# Patient Record
Sex: FEMALE | Race: BLACK OR AFRICAN AMERICAN | NOT HISPANIC OR LATINO | Employment: OTHER | URBAN - METROPOLITAN AREA
[De-identification: names, ages, dates, MRNs, and addresses within clinical notes are randomized per-mention and may not be internally consistent; named-entity substitution may affect disease eponyms.]

---

## 2023-02-24 ENCOUNTER — DOCUMENTATION (OUTPATIENT)
Dept: PRIMARY CARE | Facility: CLINIC | Age: 63
End: 2023-02-24
Payer: MEDICARE

## 2023-03-06 ENCOUNTER — NURSING HOME VISIT (OUTPATIENT)
Dept: POST ACUTE CARE | Facility: EXTERNAL LOCATION | Age: 63
End: 2023-03-06
Payer: MEDICARE

## 2023-03-06 DIAGNOSIS — R26.2 AMBULATORY DYSFUNCTION: ICD-10-CM

## 2023-03-06 DIAGNOSIS — A04.72 CLOSTRIDIUM DIFFICILE COLITIS: ICD-10-CM

## 2023-03-06 DIAGNOSIS — U07.1 COVID-19: ICD-10-CM

## 2023-03-06 DIAGNOSIS — E27.40 ADRENAL INSUFFICIENCY (MULTI): ICD-10-CM

## 2023-03-06 DIAGNOSIS — M32.9 LUPUS (MULTI): Primary | ICD-10-CM

## 2023-03-06 PROCEDURE — 99306 1ST NF CARE HIGH MDM 50: CPT | Performed by: STUDENT IN AN ORGANIZED HEALTH CARE EDUCATION/TRAINING PROGRAM

## 2023-03-06 ASSESSMENT — ENCOUNTER SYMPTOMS
RESPIRATORY NEGATIVE: 1
PSYCHIATRIC NEGATIVE: 1
NEUROLOGICAL NEGATIVE: 1
MUSCULOSKELETAL NEGATIVE: 1
GASTROINTESTINAL NEGATIVE: 1
CONSTITUTIONAL NEGATIVE: 1
CARDIOVASCULAR NEGATIVE: 1

## 2023-03-06 NOTE — PROGRESS NOTES
Subjective   Patient ID: Bing Holliday is a 62 y.o. female who was recently admitted to [facility] for worsening weakness. Pt was treated with supportive measures and overall improved. Pt was noted to test positive for C.diff, in addition, did test positive for COVID-19 as well. Was also found to have worsening hypoglycemic episdoes. Found to have adrenal insufficiency and recommend fu with endocrine. Pt overall feels well and much improved. Working well with therapy, regards no acute complaints at this time. Diarrhea improved.        Review of Systems   Constitutional: Negative.    HENT: Negative.     Respiratory: Negative.     Cardiovascular: Negative.    Gastrointestinal: Negative.    Musculoskeletal: Negative.    Neurological: Negative.    Psychiatric/Behavioral: Negative.         Objective   Physical Exam  Constitutional:       General: She is not in acute distress.     Appearance: She is not ill-appearing.   HENT:      Mouth/Throat:      Mouth: Mucous membranes are moist.      Pharynx: Oropharynx is clear. No oropharyngeal exudate or posterior oropharyngeal erythema.   Eyes:      Pupils: Pupils are equal, round, and reactive to light.   Cardiovascular:      Rate and Rhythm: Normal rate and regular rhythm.      Heart sounds: No murmur heard.  Pulmonary:      Effort: Pulmonary effort is normal. No respiratory distress.      Breath sounds: No wheezing.   Abdominal:      General: Abdomen is flat. Bowel sounds are normal. There is no distension.      Palpations: Abdomen is soft.      Tenderness: There is no abdominal tenderness.   Musculoskeletal:         General: No tenderness. Normal range of motion.   Skin:     General: Skin is warm and dry.   Neurological:      General: No focal deficit present.      Mental Status: She is alert and oriented to person, place, and time. Mental status is at baseline.   Psychiatric:         Mood and Affect: Mood normal.         Assessment/Plan   Problem List Items  Addressed This Visit          Digestive    Clostridium difficile colitis     On oral vanc, overall improved, continue isolation precautions         Adrenal insufficiency (CMS/HCC)     Likely from chronic steroids, blood sugars stable, recommend fu with endocrine            Infectious/Inflammatory    COVID-19     Overall stable on RA            Other    Lupus (CMS/HCC) - Primary     Follows with rheumatology recently exacerbated, recommend fu with rheum         Ambulatory dysfunction     Pt/ot continue current management             >50 minutes spent in chart review and care planning, staff and patient updated

## 2023-03-06 NOTE — LETTER
Subjective  Patient ID: Bing Holliday is a 62 y.o. female who was recently admitted to [facility] for worsening weakness. Pt was treated with supportive measures and overall improved. Pt was noted to test positive for C.diff, in addition, did test positive for COVID-19 as well. Was also found to have worsening hypoglycemic episdoes. Found to have adrenal insufficiency and recommend fu with endocrine. Pt overall feels well and much improved. Working well with therapy, regards no acute complaints at this time. Diarrhea improved.        Review of Systems   Constitutional: Negative.    HENT: Negative.     Respiratory: Negative.     Cardiovascular: Negative.    Gastrointestinal: Negative.    Musculoskeletal: Negative.    Neurological: Negative.    Psychiatric/Behavioral: Negative.         Objective  Physical Exam  Constitutional:       General: She is not in acute distress.     Appearance: She is not ill-appearing.   HENT:      Mouth/Throat:      Mouth: Mucous membranes are moist.      Pharynx: Oropharynx is clear. No oropharyngeal exudate or posterior oropharyngeal erythema.   Eyes:      Pupils: Pupils are equal, round, and reactive to light.   Cardiovascular:      Rate and Rhythm: Normal rate and regular rhythm.      Heart sounds: No murmur heard.  Pulmonary:      Effort: Pulmonary effort is normal. No respiratory distress.      Breath sounds: No wheezing.   Abdominal:      General: Abdomen is flat. Bowel sounds are normal. There is no distension.      Palpations: Abdomen is soft.      Tenderness: There is no abdominal tenderness.   Musculoskeletal:         General: No tenderness. Normal range of motion.   Skin:     General: Skin is warm and dry.   Neurological:      General: No focal deficit present.      Mental Status: She is alert and oriented to person, place, and time. Mental status is at baseline.   Psychiatric:         Mood and Affect: Mood normal.         Assessment/Plan  Problem List Items  Addressed This Visit          Digestive    Clostridium difficile colitis     On oral vanc, overall improved, continue isolation precautions         Adrenal insufficiency (CMS/HCC)     Likely from chronic steroids, blood sugars stable, recommend fu with endocrine            Infectious/Inflammatory    COVID-19     Overall stable on RA            Other    Lupus (CMS/HCC) - Primary     Follows with rheumatology recently exacerbated, recommend fu with rheum         Ambulatory dysfunction     Pt/ot continue current management             >50 minutes spent in chart review and care planning, staff and patient updated

## 2023-03-07 DIAGNOSIS — I10 ESSENTIAL (PRIMARY) HYPERTENSION: ICD-10-CM

## 2023-03-07 RX ORDER — FUROSEMIDE 20 MG/1
TABLET ORAL
Qty: 90 TABLET | Refills: 0 | Status: SHIPPED | OUTPATIENT
Start: 2023-03-07 | End: 2023-06-07

## 2023-03-10 ENCOUNTER — NURSING HOME VISIT (OUTPATIENT)
Dept: POST ACUTE CARE | Facility: EXTERNAL LOCATION | Age: 63
End: 2023-03-10
Payer: MEDICARE

## 2023-03-10 DIAGNOSIS — E27.40 ADRENAL INSUFFICIENCY (MULTI): ICD-10-CM

## 2023-03-10 DIAGNOSIS — A04.72 CLOSTRIDIUM DIFFICILE COLITIS: Primary | ICD-10-CM

## 2023-03-10 DIAGNOSIS — M32.9 LUPUS (MULTI): ICD-10-CM

## 2023-03-10 DIAGNOSIS — U07.1 COVID-19: ICD-10-CM

## 2023-03-10 DIAGNOSIS — R26.2 AMBULATORY DYSFUNCTION: ICD-10-CM

## 2023-03-10 PROCEDURE — 99308 SBSQ NF CARE LOW MDM 20: CPT | Performed by: STUDENT IN AN ORGANIZED HEALTH CARE EDUCATION/TRAINING PROGRAM

## 2023-03-10 ASSESSMENT — ENCOUNTER SYMPTOMS
CONSTITUTIONAL NEGATIVE: 1
CARDIOVASCULAR NEGATIVE: 1
PSYCHIATRIC NEGATIVE: 1
MUSCULOSKELETAL NEGATIVE: 1
NEUROLOGICAL NEGATIVE: 1
RESPIRATORY NEGATIVE: 1
GASTROINTESTINAL NEGATIVE: 1

## 2023-03-10 NOTE — LETTER
Subjective  Patient ID: Bing Holliday is a 62 y.o. female.    Seen on routine eval, feels overall well. Diarrhea is resolving. States she is working with therapy. No additional issues.         Review of Systems   Constitutional: Negative.    HENT: Negative.     Respiratory: Negative.     Cardiovascular: Negative.    Gastrointestinal: Negative.    Musculoskeletal: Negative.    Neurological: Negative.    Psychiatric/Behavioral: Negative.     All other systems reviewed and are negative.      Objective- vitals reviewed via EMR from [facility]  Physical Exam  Vitals and nursing note reviewed.   Constitutional:       General: She is not in acute distress.     Appearance: She is not ill-appearing.   HENT:      Mouth/Throat:      Mouth: Mucous membranes are moist.      Pharynx: Oropharynx is clear. No oropharyngeal exudate or posterior oropharyngeal erythema.   Eyes:      Pupils: Pupils are equal, round, and reactive to light.   Cardiovascular:      Rate and Rhythm: Normal rate and regular rhythm.      Heart sounds: No murmur heard.  Pulmonary:      Effort: Pulmonary effort is normal. No respiratory distress.      Breath sounds: No wheezing.   Abdominal:      General: Abdomen is flat. Bowel sounds are normal. There is no distension.      Palpations: Abdomen is soft.      Tenderness: There is no abdominal tenderness.   Musculoskeletal:         General: No tenderness. Normal range of motion.   Skin:     General: Skin is warm and dry.   Neurological:      General: No focal deficit present.      Mental Status: She is alert and oriented to person, place, and time. Mental status is at baseline.   Psychiatric:         Mood and Affect: Mood normal.         Assessment/Plan  Diagnoses and all orders for this visit:  Clostridium difficile colitis  Adrenal insufficiency (CMS/HCC)  Lupus (CMS/HCC)  Ambulatory dysfunction  COVID-19      Overall stable, continue current care, diarrhea improving. Supportive care, weekly labs    Ayaan ramirez  DO  3/10/23

## 2023-03-11 NOTE — PROGRESS NOTES
Subjective   Patient ID: Bing Holliday is a 62 y.o. female.    Seen on routine eval, feels overall well. Diarrhea is resolving. States she is working with therapy. No additional issues.         Review of Systems   Constitutional: Negative.    HENT: Negative.     Respiratory: Negative.     Cardiovascular: Negative.    Gastrointestinal: Negative.    Musculoskeletal: Negative.    Neurological: Negative.    Psychiatric/Behavioral: Negative.     All other systems reviewed and are negative.      Objective - vitals reviewed via EMR from [facility]  Physical Exam  Vitals and nursing note reviewed.   Constitutional:       General: She is not in acute distress.     Appearance: She is not ill-appearing.   HENT:      Mouth/Throat:      Mouth: Mucous membranes are moist.      Pharynx: Oropharynx is clear. No oropharyngeal exudate or posterior oropharyngeal erythema.   Eyes:      Pupils: Pupils are equal, round, and reactive to light.   Cardiovascular:      Rate and Rhythm: Normal rate and regular rhythm.      Heart sounds: No murmur heard.  Pulmonary:      Effort: Pulmonary effort is normal. No respiratory distress.      Breath sounds: No wheezing.   Abdominal:      General: Abdomen is flat. Bowel sounds are normal. There is no distension.      Palpations: Abdomen is soft.      Tenderness: There is no abdominal tenderness.   Musculoskeletal:         General: No tenderness. Normal range of motion.   Skin:     General: Skin is warm and dry.   Neurological:      General: No focal deficit present.      Mental Status: She is alert and oriented to person, place, and time. Mental status is at baseline.   Psychiatric:         Mood and Affect: Mood normal.         Assessment/Plan   Diagnoses and all orders for this visit:  Clostridium difficile colitis  Adrenal insufficiency (CMS/HCC)  Lupus (CMS/HCC)  Ambulatory dysfunction  COVID-19      Overall stable, continue current care, diarrhea improving. Supportive care, weekly labs    Ayaan  ashley DO  3/10/23

## 2023-03-15 ENCOUNTER — NURSING HOME VISIT (OUTPATIENT)
Dept: POST ACUTE CARE | Facility: EXTERNAL LOCATION | Age: 63
End: 2023-03-15
Payer: MEDICARE

## 2023-03-15 DIAGNOSIS — R26.2 AMBULATORY DYSFUNCTION: ICD-10-CM

## 2023-03-15 DIAGNOSIS — E27.40 ADRENAL INSUFFICIENCY (MULTI): ICD-10-CM

## 2023-03-15 DIAGNOSIS — A04.72 CLOSTRIDIUM DIFFICILE COLITIS: Primary | ICD-10-CM

## 2023-03-15 DIAGNOSIS — U07.1 COVID-19: ICD-10-CM

## 2023-03-15 DIAGNOSIS — M32.9 LUPUS (MULTI): ICD-10-CM

## 2023-03-15 PROCEDURE — 99308 SBSQ NF CARE LOW MDM 20: CPT | Performed by: STUDENT IN AN ORGANIZED HEALTH CARE EDUCATION/TRAINING PROGRAM

## 2023-03-15 ASSESSMENT — ENCOUNTER SYMPTOMS
CONSTITUTIONAL NEGATIVE: 1
GASTROINTESTINAL NEGATIVE: 1
NEUROLOGICAL NEGATIVE: 1
MUSCULOSKELETAL NEGATIVE: 1
RESPIRATORY NEGATIVE: 1
PSYCHIATRIC NEGATIVE: 1
CARDIOVASCULAR NEGATIVE: 1

## 2023-03-15 NOTE — LETTER
Patient: Bing Holliday  : 1960    Encounter Date: 03/15/2023    Subjective  Patient ID: Bing Holliday is a 62 y.o. female.    Pt seen and evaluated. Overall is doing well with no complaints. She is working well with therapy and feels much improved but still feels weak. Feels as if she could benefit from further therapy. Diarrhea has improved. She reports no additional issues or concerns.         Review of Systems   Constitutional: Negative.    HENT: Negative.     Respiratory: Negative.     Cardiovascular: Negative.    Gastrointestinal: Negative.    Musculoskeletal: Negative.    Neurological: Negative.    Psychiatric/Behavioral: Negative.         ObjectiveVitals reviewed via facility EMR  Physical Exam  Constitutional:       General: She is not in acute distress.     Appearance: She is not ill-appearing.   HENT:      Mouth/Throat:      Mouth: Mucous membranes are moist.      Pharynx: Oropharynx is clear. No oropharyngeal exudate or posterior oropharyngeal erythema.   Eyes:      Pupils: Pupils are equal, round, and reactive to light.   Cardiovascular:      Rate and Rhythm: Normal rate and regular rhythm.      Heart sounds: No murmur heard.  Pulmonary:      Effort: Pulmonary effort is normal. No respiratory distress.      Breath sounds: No wheezing.   Abdominal:      General: Abdomen is flat. Bowel sounds are normal. There is no distension.      Palpations: Abdomen is soft.      Tenderness: There is no abdominal tenderness.   Musculoskeletal:         General: No tenderness. Normal range of motion.   Skin:     General: Skin is warm and dry.   Neurological:      General: No focal deficit present.      Mental Status: She is alert and oriented to person, place, and time. Mental status is at baseline.   Psychiatric:         Mood and Affect: Mood normal.         Assessment/Plan  Diagnoses and all orders for this visit:  Clostridium difficile colitis  Adrenal insufficiency (CMS/[facility])  COVID-19  Lupus  (CMS/[facility])  Ambulatory dysfunction    Pt overall improved, continue PT/OT, would benefit from further therapy in SNF. Weekly labs, supportive care    Ayaan Koehler DO  3/15/23      Electronically Signed By: Ayaan Koehler DO   3/15/23  9:34 PM

## 2023-03-16 NOTE — PROGRESS NOTES
Subjective   Patient ID: Bing Holliday is a 62 y.o. female.    Pt seen and evaluated. Overall is doing well with no complaints. She is working well with therapy and feels much improved but still feels weak. Feels as if she could benefit from further therapy. Diarrhea has improved. She reports no additional issues or concerns.         Review of Systems   Constitutional: Negative.    HENT: Negative.     Respiratory: Negative.     Cardiovascular: Negative.    Gastrointestinal: Negative.    Musculoskeletal: Negative.    Neurological: Negative.    Psychiatric/Behavioral: Negative.         Objective Vitals reviewed via facility EMR  Physical Exam  Constitutional:       General: She is not in acute distress.     Appearance: She is not ill-appearing.   HENT:      Mouth/Throat:      Mouth: Mucous membranes are moist.      Pharynx: Oropharynx is clear. No oropharyngeal exudate or posterior oropharyngeal erythema.   Eyes:      Pupils: Pupils are equal, round, and reactive to light.   Cardiovascular:      Rate and Rhythm: Normal rate and regular rhythm.      Heart sounds: No murmur heard.  Pulmonary:      Effort: Pulmonary effort is normal. No respiratory distress.      Breath sounds: No wheezing.   Abdominal:      General: Abdomen is flat. Bowel sounds are normal. There is no distension.      Palpations: Abdomen is soft.      Tenderness: There is no abdominal tenderness.   Musculoskeletal:         General: No tenderness. Normal range of motion.   Skin:     General: Skin is warm and dry.   Neurological:      General: No focal deficit present.      Mental Status: She is alert and oriented to person, place, and time. Mental status is at baseline.   Psychiatric:         Mood and Affect: Mood normal.         Assessment/Plan   Diagnoses and all orders for this visit:  Clostridium difficile colitis  Adrenal insufficiency (CMS/[facility])  COVID-19  Lupus (CMS/[facility])  Ambulatory dysfunction    Pt overall improved, continue PT/OT, would  benefit from further therapy in SNF. Weekly labs, supportive care    Ayaan Koehler DO  3/15/23

## 2023-03-17 ENCOUNTER — PATIENT OUTREACH (OUTPATIENT)
Dept: PRIMARY CARE | Facility: CLINIC | Age: 63
End: 2023-03-17
Payer: MEDICARE

## 2023-03-17 DIAGNOSIS — A04.72 ENTEROCOLITIS DUE TO CLOSTRIDIUM DIFFICILE: ICD-10-CM

## 2023-03-17 NOTE — PROGRESS NOTES
"Discharge facility: [facility]  Discharge diagnosis: Enterocolitis Due to Clostridium Difficile, Covid 19, weakness   Admission date: 3/4/2023  Discharge date: 3/16/2023     HPI from [place] on 3/6:  \"Bing Holliday is a 62 y.o. female who was recently admitted to [facility] for worsening weakness. Pt was treated with supportive measures and overall improved. Pt was noted to test positive for C.diff, in addition, did test positive for COVID-19 as well. Was also found to have worsening hypoglycemic episdoes. Found to have adrenal insufficiency and recommend fu with endocrine. Pt overall feels well and much improved. Working well with therapy, regards no acute complaints at this time. Diarrhea improved.\"       3/15/2023:  \"Pt seen and evaluated. Overall is doing well with no complaints. She is working well with therapy and feels much improved but still feels weak. Feels as if she could benefit from further therapy. Diarrhea has improved. She reports no additional issues or concerns.      Pt overall improved, continue PT/OT, would benefit from further therapy in SNF. Weekly labs, supportive care\"    Medications  Medications reviewed with patient/caregiver?: No (3/17/2023  4:59 PM)  Is the patient having any side effects they believe may be caused by any medication additions or changes?: No (3/17/2023  4:59 PM)  Does the patient have all medications ordered at discharge?: Yes (3/17/2023  4:59 PM)  Is the patient taking all medications as directed (includes completed medication regime)?: Yes (3/17/2023  4:59 PM)    Appointments  Does the patient have a primary care provider?: Yes (3/17/2023  4:59 PM)  Care Management Interventions: Advised patient to make appointment (3/17/2023  4:59 PM)    Self Management  What is the home health agency?: [facility] (3/17/2023  4:59 PM)  Has home health visited the patient within 72 hours of discharge?: No (3/17/2023  4:59 PM)  Home " Health Interventions: Called home health agency (3/17/2023  4:59 PM)  Follow Up Tasks: Home Health (3/17/2023  4:59 PM)    Patient Teaching  What is the patient's perception of their health status since discharge?: Improving (3/17/2023  4:59 PM)      Spoke to patient. She is home now and states she is doing well. She does have someone that she is living with. She is eating and drinking well. She denies any medication questions. She has the medications that were given to her from the rehab facility. Patient is able to get up and ambulate using a walker. She is alert and oriented x3. She has not heard from the Home health company. Prior to admission she was active with [facility]. I called and left them a message that patient is home now and will need a resumption of care. She has not made appt with PCP yet. I plan on calling her back next week to make sure she has been contacted by home care and I can help arrange an appt.

## 2023-03-20 ENCOUNTER — PATIENT OUTREACH (OUTPATIENT)
Dept: PRIMARY CARE | Facility: CLINIC | Age: 63
End: 2023-03-20
Payer: MEDICARE

## 2023-03-20 DIAGNOSIS — M32.9 LUPUS (MULTI): ICD-10-CM

## 2023-03-20 DIAGNOSIS — A04.72 ENTEROCOLITIS DUE TO CLOSTRIDIUM DIFFICILE: ICD-10-CM

## 2023-03-20 DIAGNOSIS — E27.40 ADRENAL INSUFFICIENCY (MULTI): ICD-10-CM

## 2023-03-20 NOTE — CARE PLAN
Call placed to [facility]. They said they never received any actual orders from the SNF. I called and spoke to Samson at [facility] and she confirmed this was sent to Trish Preston at [facility]. So I called them back and left another message letting them know this is who should have the orders.   CCM outreach attempted to patient. LMTCB on home number.

## 2023-04-06 ENCOUNTER — PATIENT OUTREACH (OUTPATIENT)
Dept: PRIMARY CARE | Facility: CLINIC | Age: 63
End: 2023-04-06
Payer: MEDICARE

## 2023-04-06 DIAGNOSIS — M32.9 LUPUS (MULTI): ICD-10-CM

## 2023-04-06 DIAGNOSIS — E27.40 ADRENAL INSUFFICIENCY (MULTI): ICD-10-CM

## 2023-04-06 RX ORDER — FOLIC ACID 1 MG/1
1 TABLET ORAL DAILY
COMMUNITY
Start: 2021-12-02 | End: 2023-07-05

## 2023-04-06 RX ORDER — ARIPIPRAZOLE 2 MG/1
2 TABLET ORAL NIGHTLY
COMMUNITY
Start: 2023-04-04 | End: 2023-04-27 | Stop reason: SDUPTHER

## 2023-04-06 RX ORDER — BLOOD-GLUCOSE METER
1 EACH MISCELLANEOUS
COMMUNITY
Start: 2023-04-04 | End: 2023-07-05 | Stop reason: SDUPTHER

## 2023-04-06 RX ORDER — LANCETS 33 GAUGE
1 EACH MISCELLANEOUS
Status: ON HOLD | COMMUNITY
Start: 2023-04-04 | End: 2023-10-20 | Stop reason: ENTERED-IN-ERROR

## 2023-04-06 RX ORDER — PANTOPRAZOLE SODIUM 40 MG/1
1 TABLET, DELAYED RELEASE ORAL DAILY
COMMUNITY
Start: 2021-11-24 | End: 2023-04-26

## 2023-04-06 RX ORDER — AMLODIPINE BESYLATE 2.5 MG/1
1 TABLET ORAL DAILY
COMMUNITY
Start: 2022-02-07 | End: 2024-03-26 | Stop reason: HOSPADM

## 2023-04-06 RX ORDER — MYCOPHENOLATE MOFETIL 500 MG/1
1000 TABLET ORAL 2 TIMES DAILY
COMMUNITY
Start: 2021-07-29 | End: 2024-02-07

## 2023-04-06 RX ORDER — LANCETS 30 GAUGE
EACH MISCELLANEOUS
Status: ON HOLD | COMMUNITY
Start: 2023-04-04 | End: 2023-10-20 | Stop reason: ENTERED-IN-ERROR

## 2023-04-06 RX ORDER — VANCOMYCIN HYDROCHLORIDE 125 MG/1
125 CAPSULE ORAL EVERY OTHER DAY
COMMUNITY
Start: 2023-04-04 | End: 2023-04-10

## 2023-04-06 RX ORDER — ATORVASTATIN CALCIUM 40 MG/1
40 TABLET, FILM COATED ORAL DAILY
COMMUNITY
End: 2023-04-21 | Stop reason: SDUPTHER

## 2023-04-06 RX ORDER — PREDNISONE 5 MG/1
TABLET ORAL EVERY 24 HOURS
COMMUNITY
Start: 2023-04-04 | End: 2023-04-06

## 2023-04-06 NOTE — PROGRESS NOTES
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 Initially treated on stress dose steroids initially due to concerns for adrenal insufficiency however her AM cortisol and ACTH not c/w this. Endocrinology consulted for evaluation. Labs not c/w insulinoma. Hypoglycemia resolved. MRI brain ordered to evaluate for CVA seen on CT, demonstrated remote PCA territory infarct. She continues on home apixaban and statin. TTE without e/o thrombus or PFO. Course also c/b recurrent hypothermia, felt due to lupus (however labs not c/w flare) vs risperidone. She was started on high dose prednisone and switched to abilify. Hypothermia resolved. She will be discharged on steroid taper and will f/u with Dr Castellanos and her PCP. Unfornately she was denied precert for acute rehab and SNF, and after conversation with her son it was ultimately agreed that she would go home with her son and [facility] for PT/OT/RN and HHA.     Of note, patient noted to have more labile BGs towards the end of her admission. Given her tendency to run towards symptomatic hypoglycemia, elected not to start any antihyperglycemics as it is anticipated that her BGs will decrease when her steroid dose decreases and her BG still remains low-normal in the AM. Renewed rx for test strips, lancets, glucose tabs.     Medications  Medications reviewed with patient/caregiver?: Yes (4/6/2023  2:28 PM)  Is the patient having any side effects they believe may be caused by any medication additions or changes?: (!)  Yes (Prednisone causing elevated glucose levels) (4/6/2023  2:28 PM)  Does the patient have all medications ordered at discharge?: Yes (4/6/2023  2:28 PM)  Care Management Interventions: Advised patient to call provider (4/6/2023  2:28 PM)  Is the patient taking all medications as directed (includes completed medication regime)?: Yes (4/6/2023  2:28 PM)  Care Management Interventions: Notified provider (4/6/2023  2:28 PM)  Medication Comments: Message to provider about glucose of 538 last evening and 284 this morning. (4/6/2023  2:28 PM)    Appointments  Does the patient have a primary care provider?: Yes (4/6/2023  2:28 PM)  Care Management Interventions: Verified appointment date/time/provider (4/6/2023  2:28 PM)  Has the patient kept scheduled appointments due by today?: Yes (4/6/2023  2:28 PM)  Care Management Interventions: Advised patient to keep appointment (4/6/2023  2:28 PM)    Self Management  What is the home health agency?: [facility] (4/6/2023  2:28 PM)  Has home health visited the patient within 72 hours of discharge?: Yes (4/6/2023  2:28 PM)    Patient Teaching  Does the patient have access to their discharge instructions?: Yes (4/6/2023  2:28 PM)  What is the patient's perception of their health status since discharge?: Improving (4/6/2023  2:28 PM)  Is the patient/caregiver able to teach back the hierarchy of who to call/visit for symptoms/problems? PCP, Specialist, Home Health nurse, Urgent Care, ED, 911: Yes (4/6/2023  2:28 PM)  Patient/Caregiver Education Comments: Patient checking Temp twice a day. Patient aware if <95 degrees persistently, she must return to ER (4/6/2023  2:28 PM)      RNCM call to patient:  Called and spoke to patient regarding recent discharge from the hospital. Patient was sent home on a Prednisone taper dose. She was having issues while admitted with elevated and low blood glucose levels. They did not want her going home on antihyperglycemics because she tended to  run towards symptomatic hypoglycemia. Also because the elevated glucose levels are most likely related to steroid use and should fall once that is complete. However patient will still be on this for some time. She is checking her glucose at home. Last night after dinner she got 538 and this morning fasting she got 284. These are very elevated so I am sending a message to the PCP to see if he wants to initiate any new medications. Patient follows up with him on 4/13. We reviewed all other medications. Patient lives with family. She is doing better with her mentation. She is doing better with ambulation. [facility] RN was out to see her already. PT/OT will evaluate her next week. She is checking her temp twice a daily as advised on her summary. If anything persistently less than 95, she is to return to the ER for evaluation. Patient does not have an appt with Dr. Castellanos (Rheumatology) yet. I offered to help make one, but she felt comfortable doing this herself.

## 2023-04-10 ENCOUNTER — PATIENT OUTREACH (OUTPATIENT)
Dept: PRIMARY CARE | Facility: CLINIC | Age: 63
End: 2023-04-10
Payer: MEDICARE

## 2023-04-10 DIAGNOSIS — E27.40 ADRENAL INSUFFICIENCY (MULTI): ICD-10-CM

## 2023-04-10 DIAGNOSIS — E11.42 DIABETIC POLYNEUROPATHY ASSOCIATED WITH TYPE 2 DIABETES MELLITUS (MULTI): ICD-10-CM

## 2023-04-10 DIAGNOSIS — M32.9 LUPUS (MULTI): ICD-10-CM

## 2023-04-10 NOTE — CARE PLAN
Call placed to patient. She has not heard back from PCP's office about her glucose levels. She states she is still having elevated levels. Highest over the weekend was 530. She is in the 200's in the mornings. She figured this meant that he wasn't concerned about it since she didn't get a call. She does have an appt on Thursday with him for follow up. I did double check and I did route my last note to him for review and I do not see this issue has been addressed.

## 2023-04-13 ENCOUNTER — OFFICE VISIT (OUTPATIENT)
Dept: PRIMARY CARE | Facility: CLINIC | Age: 63
End: 2023-04-13
Payer: MEDICARE

## 2023-04-13 ENCOUNTER — TELEPHONE (OUTPATIENT)
Dept: PRIMARY CARE | Facility: CLINIC | Age: 63
End: 2023-04-13

## 2023-04-13 VITALS
TEMPERATURE: 96.9 F | BODY MASS INDEX: 29.33 KG/M2 | DIASTOLIC BLOOD PRESSURE: 80 MMHG | WEIGHT: 198 LBS | SYSTOLIC BLOOD PRESSURE: 124 MMHG | OXYGEN SATURATION: 95 % | HEART RATE: 64 BPM | HEIGHT: 69 IN | RESPIRATION RATE: 16 BRPM

## 2023-04-13 DIAGNOSIS — Z78.0 POSTMENOPAUSAL: Primary | ICD-10-CM

## 2023-04-13 DIAGNOSIS — D46.9 MYELODYSPLASTIC SYNDROME, UNSPECIFIED (MULTI): ICD-10-CM

## 2023-04-13 DIAGNOSIS — Z79.52 ON PREDNISONE THERAPY: ICD-10-CM

## 2023-04-13 DIAGNOSIS — I77.89 LUPUS VASCULITIS (MULTI): ICD-10-CM

## 2023-04-13 DIAGNOSIS — E11.65 UNCONTROLLED DIABETES MELLITUS WITH HYPERGLYCEMIA, WITHOUT LONG-TERM CURRENT USE OF INSULIN (MULTI): ICD-10-CM

## 2023-04-13 DIAGNOSIS — F41.9 ANXIETY: ICD-10-CM

## 2023-04-13 DIAGNOSIS — M32.9 LUPUS (MULTI): Primary | ICD-10-CM

## 2023-04-13 DIAGNOSIS — I10 PRIMARY HYPERTENSION: ICD-10-CM

## 2023-04-13 DIAGNOSIS — R26.2 AMBULATORY DYSFUNCTION: ICD-10-CM

## 2023-04-13 DIAGNOSIS — M32.19 LUPUS VASCULITIS (MULTI): ICD-10-CM

## 2023-04-13 PROBLEM — A04.72 CLOSTRIDIUM DIFFICILE COLITIS: Status: RESOLVED | Noted: 2023-03-06 | Resolved: 2023-04-13

## 2023-04-13 PROCEDURE — 3079F DIAST BP 80-89 MM HG: CPT | Performed by: FAMILY MEDICINE

## 2023-04-13 PROCEDURE — 3066F NEPHROPATHY DOC TX: CPT | Performed by: FAMILY MEDICINE

## 2023-04-13 PROCEDURE — 99495 TRANSJ CARE MGMT MOD F2F 14D: CPT | Performed by: FAMILY MEDICINE

## 2023-04-13 PROCEDURE — 3074F SYST BP LT 130 MM HG: CPT | Performed by: FAMILY MEDICINE

## 2023-04-13 PROCEDURE — 3044F HG A1C LEVEL LT 7.0%: CPT | Performed by: FAMILY MEDICINE

## 2023-04-13 PROCEDURE — 1036F TOBACCO NON-USER: CPT | Performed by: FAMILY MEDICINE

## 2023-04-13 RX ORDER — MULTIVITAMIN
1 TABLET ORAL DAILY
Status: ON HOLD | COMMUNITY
Start: 2022-05-26 | End: 2024-05-08 | Stop reason: ENTERED-IN-ERROR

## 2023-04-13 RX ORDER — PREDNISONE 2.5 MG/1
1 TABLET ORAL DAILY
COMMUNITY
Start: 2022-08-31 | End: 2023-07-05

## 2023-04-13 RX ORDER — FLUDROCORTISONE ACETATE 0.1 MG/1
0.1 TABLET ORAL
COMMUNITY
End: 2023-05-24

## 2023-04-13 NOTE — PROGRESS NOTES
"Subjective   Patient ID: Bing Holliday is a 62 y.o. female who presents for Hospital Follow-up.  [facility]  Hospital follow up  KRISTAL complete  Admitted to [facility]  Admission dates 3/22/23 to 04/04/23  Admitted for low blood glucose and low BP  Days since discharge 9 day  Patient had BW, EKG, MRI    Today's BS was 151   Patient advised to follow up with PCP    Patient was prescribed     No other concern     Review of systems  ; Patient seen today for exam denies any problems with headaches or vision, denies any shortness of breath chest pain nausea or vomiting, no black stool no blood in the stool no heartburn type symptoms denies any problems with constipation or diarrhea, and no dysuria-type symptoms    The patient's allergies medications were reviewed with them today    The patient's social family and surgical history or also reviewed here today, along with her past medical history.     Objective     Alert and active in  no acute distress  HEENT TMs clear oropharynx negative nares clear no drainage noted neck supple  With no adenopathy   Heart regular rate and rhythm without murmur and no carotid bruits  Lungs- clear to auscultation bilaterally, no wheeze or rhonchi noted  Thyroid -negative masses or nodularity  Abdomen- soft times four quadrants , bowel sounds positive no masses or organomegaly, negative tenderness guarding or rebound  Neurological exam unremarkable- DTRs in upper and lower extremities within normal limits.   skin -no lesions noted      /80 (BP Location: Right arm, Patient Position: Sitting, BP Cuff Size: Large adult)   Pulse 64   Temp 36.1 °C (96.9 °F) (Temporal)   Resp 16   Ht 1.753 m (5' 9\")   Wt 89.8 kg (198 lb)   SpO2 95%   BMI 29.24 kg/m²     Allergies   Allergen Reactions    Ace Inhibitors Other    Penicillins Unknown     tolerates cephalosporins    From Childhood    Mbr sts that she was told since young that she is allergic    Sulfa (Sulfonamide Antibiotics) Hives "       Assessment/Plan   Problem List Items Addressed This Visit          Infectious/Inflammatory    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HCC)       Other    Lupus (CMS/HCC) - Primary    Ambulatory dysfunction    Myelodysplastic syndrome, unspecified (CMS/HCC)     Other Visit Diagnoses       Anxiety        Primary hypertension        Uncontrolled diabetes mellitus with hyperglycemia, without long-term current use of insulin (CMS/[facility])              Talked about Jardiance and Farxigia.  Talked about Ozempic and Monjaro.       Subsequently the patient has diagnosis of cerebral infarct importance presents with nonfixed contractures bilateral wrists hands and fingers, she would benefit from bilateral hand orthosis to minimize nonfixed contractures and promote optimal positioning and hand, minimizing her deformities I also reviewed orthotists assessment it would like her fit with bilateral W HFO's      If I can be any further assistance but what she needs we will be glad to as she does not want to drive again    Follow up in 6 - 8 weeks or sooner if needed.     If anything worsens or changes please call us at once, follow up in the office as planned,     Scribe Attestation  By signing my name below, I, Taylor Aj MA , Scribe   attest that this documentation has been prepared under the direction and in the presence of Claudio Hood DO.

## 2023-04-13 NOTE — TELEPHONE ENCOUNTER
Pt just saw you today and the son states that she was supposed to get an order for a bone density test. There isn't an order in her chart. Ok to order bone density?  Please advise  Thanks      Call son to set up at [phone]

## 2023-04-21 DIAGNOSIS — E78.5 HYPERLIPIDEMIA, UNSPECIFIED HYPERLIPIDEMIA TYPE: ICD-10-CM

## 2023-04-21 NOTE — TELEPHONE ENCOUNTER
Rx Refill Request Telephone Encounter    Name:  Bing Holliday  : 1960     Medication Name:  ATORVASTATIN  Dose (Optional):    40 MG  Quantity (Optional):      Directions (Optional):   Take 1 tablet (40 mg) by mouth once daily.     ALLERGIES:   Ace Inhibitors Penicillins  cephalosporins From Childhood Mbr sts that she was told since young that she is allergic Sulfa (sulfonamide Antibiotics)     Specific Pharmacy location:  [facility]    Best number to reach patient:  [phone]

## 2023-04-21 NOTE — TELEPHONE ENCOUNTER
Spoke with patient and her son. Scheduled appointment on schedule me now for Friday 5/12/23 @ 3:00pm at  [facility]  Pt aware

## 2023-04-24 RX ORDER — ATORVASTATIN CALCIUM 40 MG/1
40 TABLET, FILM COATED ORAL DAILY
Qty: 90 TABLET | Refills: 1 | Status: SHIPPED | OUTPATIENT
Start: 2023-04-24 | End: 2023-10-11 | Stop reason: SDUPTHER

## 2023-04-26 DIAGNOSIS — K21.9 GASTRO-ESOPHAGEAL REFLUX DISEASE WITHOUT ESOPHAGITIS: ICD-10-CM

## 2023-04-26 RX ORDER — PANTOPRAZOLE SODIUM 40 MG/1
TABLET, DELAYED RELEASE ORAL
Qty: 90 TABLET | Refills: 1 | Status: ON HOLD | OUTPATIENT
Start: 2023-04-26 | End: 2023-10-23

## 2023-04-27 DIAGNOSIS — F32.A DEPRESSION, UNSPECIFIED DEPRESSION TYPE: ICD-10-CM

## 2023-04-27 DIAGNOSIS — M32.9 LUPUS (MULTI): ICD-10-CM

## 2023-04-27 RX ORDER — LANOLIN ALCOHOL/MO/W.PET/CERES
1 CREAM (GRAM) TOPICAL DAILY
Qty: 30 TABLET | Refills: 3 | Status: SHIPPED | OUTPATIENT
Start: 2023-04-27 | End: 2023-05-27

## 2023-04-27 RX ORDER — LANOLIN ALCOHOL/MO/W.PET/CERES
1 CREAM (GRAM) TOPICAL DAILY
COMMUNITY
Start: 2023-03-03 | End: 2023-04-27 | Stop reason: SDUPTHER

## 2023-04-27 RX ORDER — ARIPIPRAZOLE 2 MG/1
2 TABLET ORAL NIGHTLY
Qty: 30 TABLET | Refills: 3 | Status: SHIPPED | OUTPATIENT
Start: 2023-04-27 | End: 2023-08-01 | Stop reason: ALTCHOICE

## 2023-04-27 NOTE — TELEPHONE ENCOUNTER
Pt is calling because she needs refills on 2 meds that she was given while in the hospital     Rx Refill Request Telephone Encounter    Name:  Bing Holliday  : 1960     Medication Name:  magnesium oxide  Dose (Optional):    400mg  Quantity (Optional):    #30  Directions (Optional):   take 1 tab once daily    ALLERGIES:   see list    Specific Pharmacy location:  [facility]    Date of last appointment:  23  Date of next appointment:  23    Best number to reach patient:  [phone]    Rx Refill Request Telephone Encounter    Name:  Bing Holliday  : 1960     Medication Name:  abilify  Dose (Optional):    2 mg  Quantity (Optional):    #30  Directions (Optional):   take 1 tab at bedtime    ALLERGIES:   see list    Specific Pharmacy location:  [facility]    Date of last appointment:  23  Date of next appointment:  23    Best number to reach patient:  [phone]

## 2023-04-28 DIAGNOSIS — Z79.899 MEDICATION MANAGEMENT: Primary | ICD-10-CM

## 2023-05-16 ENCOUNTER — TELEMEDICINE (OUTPATIENT)
Dept: PHARMACY | Facility: HOSPITAL | Age: 63
End: 2023-05-16
Payer: MEDICARE

## 2023-05-16 DIAGNOSIS — Z79.899 MEDICATION MANAGEMENT: ICD-10-CM

## 2023-05-16 NOTE — PROGRESS NOTES
Pharmacist Clinic: SOLANGE Holliday was referred to the Clinical Pharmacy Team for financial assistance with Eliquis and Trelegy.    Referring Provider: Claudio Hood DO  Problem List Items Addressed This Visit    None  Visit Diagnoses       Medication management              MEDICATION ASSESSMENT    Allergies: Ace inhibitors, Penicillins, and Sulfa (sulfonamide antibiotics)     [id number] [place], [place] - [address]  [address] [facility]  [place] [address]  Phone: [phone] Fax: [phone]    LAB  Lab Results   Component Value Date    BILITOT 0.2 03/23/2023    CALCIUM CANCELED 04/05/2023    CO2 CANCELED 04/05/2023    CL CANCELED 04/05/2023    CREATININE CANCELED 04/05/2023    GLUCOSE CANCELED 04/05/2023    ALKPHOS 110 03/23/2023    K CANCELED 04/05/2023    PROT 5.6 (L) 03/23/2023    NA CANCELED 04/05/2023    AST 20 03/23/2023    ALT 22 03/23/2023    BUN CANCELED 04/05/2023    ANIONGAP CANCELED 04/05/2023    MG CANCELED 04/05/2023    PHOS CANCELED 04/05/2023    GGT 63 (H) 01/07/2021     04/30/2021    ALBUMIN CANCELED 04/05/2023    LIPASE 30 01/24/2023     Lab Results   Component Value Date    TRIG 79 03/24/2023    CHOL 160 03/24/2023    HDL 70.9 03/24/2023     Lab Results   Component Value Date    HGBA1C 6.3 (A) 03/22/2023       DRUG INTERACTIONS  - No significant drug-drug interactions exist that require adjustment to therapy    CURRENT PHARMACOTHERAPY  - Eliquis 5 mg BID  - Trelegy 200-62.5-25 - 1 puff by mouth daily    PATIENT EDUCATION/DISCUSSION  - Spoke with patient regarding  PAP program.     - Patient verbally reports monthly or yearly income which is less than 400% federal poverty level  - Application for program has been submitted for the following medications: Eliquis 5 mg BID and Trelegy 200-62.5-25 daily  - Patient has been informed that program team will be reaching out to them to discuss necessary documentation, instructed to answer phone/return  voicemail.   - Patient aware this process may take up to 4 weeks.   - If approved medication must be filled through [facility] pharmacy and may be picked up or mailed to patient.      - Answered all patient questions and concerns; provided [facility] phone number if issues/questions arise    RECOMMENDATIONS/PLAN  Patient is to search home for social security form and daughter's 5750 form. Will follow-up in one week to ensure patient was able to find financial documentation and then pharmacy team will proceed.    2.  Continue: Continue all meds under the continuation of care with the referring provider and clinical pharmacy team.    Clinical Pharmacist follow up: 5/24/2023 @ 1:30 pm  Type of Encounter: Virtual    Thank you,  Stephanie Byers, Marina    Verbal consent to manage patient's drug therapy was obtained from patient. They were informed they may decline to participate or withdraw from participation in pharmacy services at any time.

## 2023-05-18 ENCOUNTER — TELEPHONE (OUTPATIENT)
Dept: PRIMARY CARE | Facility: CLINIC | Age: 63
End: 2023-05-18
Payer: MEDICARE

## 2023-05-18 NOTE — TELEPHONE ENCOUNTER
----- Message from Claudio Hood DO sent at 5/17/2023  5:20 PM EDT -----  Bone density is showing some osteoporosis in certain parts of her bones we can discuss it with her next appointment to discuss medications to help with this long-term,,

## 2023-05-24 ENCOUNTER — TELEMEDICINE (OUTPATIENT)
Dept: PHARMACY | Facility: HOSPITAL | Age: 63
End: 2023-05-24
Payer: MEDICARE

## 2023-05-24 DIAGNOSIS — Z79.899 MEDICATION MANAGEMENT: ICD-10-CM

## 2023-05-24 NOTE — PROGRESS NOTES
Pharmacist Clinic: SOLANGE Holliday was referred to the Clinical Pharmacy Team for financial assistance with Eliquis and Trelegy. Since last [facility] visit, patient has not yet obtained necessary financial documents, therefore cannot proceed with application.     Referring Provider: Claudio Hood DO  Problem List Items Addressed This Visit    None  Visit Diagnoses       Medication management              MEDICATION ASSESSMENT    Allergies: Ace inhibitors, Penicillins, and Sulfa (sulfonamide antibiotics)     [id number] [place], [place] - [address]  [address] [facility]  [place] [address]  Phone: [phone] Fax: [phone]    LAB  Lab Results   Component Value Date    BILITOT 0.2 03/23/2023    CALCIUM CANCELED 04/05/2023    CO2 CANCELED 04/05/2023    CL CANCELED 04/05/2023    CREATININE CANCELED 04/05/2023    GLUCOSE CANCELED 04/05/2023    ALKPHOS 110 03/23/2023    K CANCELED 04/05/2023    PROT 5.6 (L) 03/23/2023    NA CANCELED 04/05/2023    AST 20 03/23/2023    ALT 22 03/23/2023    BUN CANCELED 04/05/2023    ANIONGAP CANCELED 04/05/2023    MG CANCELED 04/05/2023    PHOS CANCELED 04/05/2023    GGT 63 (H) 01/07/2021     04/30/2021    ALBUMIN CANCELED 04/05/2023    LIPASE 30 01/24/2023     Lab Results   Component Value Date    TRIG 79 03/24/2023    CHOL 160 03/24/2023    HDL 70.9 03/24/2023     Lab Results   Component Value Date    HGBA1C 6.3 (A) 03/22/2023       DRUG INTERACTIONS  - No significant drug-drug interactions exist that require adjustment to therapy    CURRENT PHARMACOTHERAPY  - Eliquis 5 mg BID  - Trelegy 200-62.5-25 - 1 puff by mouth daily    PATIENT EDUCATION/DISCUSSION  - Spoke with patient regarding  PAP program.     - Patient verbally reports monthly or yearly income which is less than 400% federal poverty level  - Application for program has been submitted for the following medications: Eliquis 5 mg BID and Trelegy 200-62.5-25 daily  - Patient has been  informed that program team will be reaching out to them to discuss necessary documentation, instructed to answer phone/return voicemail.   - Patient aware this process may take up to 4 weeks.   - If approved medication must be filled through [facility] pharmacy and may be picked up or mailed to patient.      - Answered all patient questions and concerns; provided [facility] phone number if issues/questions arise    RECOMMENDATIONS/PLAN  Patient is to search home for social security form and daughter's 1040 form. Will follow-up in one week to ensure patient was able to find financial documentation and then pharmacy team will proceed.    2.  Continue: Continue all meds under the continuation of care with the referring provider and clinical pharmacy team.    Clinical Pharmacist follow up: 5/31/2023 @ 11:30 am  Type of Encounter: Virtual    Thank you,  Stephanie Byers PharmD    Verbal consent to manage patient's drug therapy was obtained from patient. They were informed they may decline to participate or withdraw from participation in pharmacy services at any time.

## 2023-05-25 ENCOUNTER — APPOINTMENT (OUTPATIENT)
Dept: PRIMARY CARE | Facility: CLINIC | Age: 63
End: 2023-05-25
Payer: MEDICARE

## 2023-05-26 DIAGNOSIS — M32.9 LUPUS (MULTI): ICD-10-CM

## 2023-05-27 RX ORDER — LANOLIN ALCOHOL/MO/W.PET/CERES
CREAM (GRAM) TOPICAL
Qty: 30 TABLET | Refills: 3 | Status: SHIPPED | OUTPATIENT
Start: 2023-05-27 | End: 2023-08-01 | Stop reason: ALTCHOICE

## 2023-05-30 ENCOUNTER — DOCUMENTATION (OUTPATIENT)
Dept: PRIMARY CARE | Facility: CLINIC | Age: 63
End: 2023-05-30
Payer: MEDICARE

## 2023-05-30 DIAGNOSIS — M32.9 LUPUS (MULTI): ICD-10-CM

## 2023-05-30 DIAGNOSIS — E11.65 UNCONTROLLED DIABETES MELLITUS WITH HYPERGLYCEMIA, WITHOUT LONG-TERM CURRENT USE OF INSULIN (MULTI): ICD-10-CM

## 2023-05-30 DIAGNOSIS — F41.9 ANXIETY: ICD-10-CM

## 2023-05-30 DIAGNOSIS — R26.2 AMBULATORY DYSFUNCTION: ICD-10-CM

## 2023-05-30 NOTE — PROGRESS NOTES
In basket message received that patient was admitted. Chart review completed. She is complaining of weakness and overall not feeling safe at home. She is most likely going to be discharged to a SNF per current inpatient notes. [facility].

## 2023-05-31 ENCOUNTER — TELEMEDICINE (OUTPATIENT)
Dept: PHARMACY | Facility: HOSPITAL | Age: 63
End: 2023-05-31
Payer: MEDICARE

## 2023-05-31 DIAGNOSIS — Z79.899 MEDICATION MANAGEMENT: ICD-10-CM

## 2023-05-31 NOTE — PROGRESS NOTES
Pharmacist Clinic: SOLANGE Holliday was referred to the Clinical Pharmacy Team for financial assistance with Eliquis and Trelegy. Since last [facility] visit, patient has not yet obtained necessary financial documents, therefore cannot proceed with application. Patient is currently admitted at [facility].    Referring Provider: Claudio Hood DO  Problem List Items Addressed This Visit    None  Visit Diagnoses       Medication management              MEDICATION ASSESSMENT    Allergies: Ace inhibitors, Penicillins, and Sulfa (sulfonamide antibiotics)     [id number] [place], [place] - [address] [facility] [address] [facility]  [address] [facility] [address]  Phone: [phone] Fax: [phone]    LAB  Lab Results   Component Value Date    BILITOT 0.3 05/28/2023    CALCIUM 9.3 05/31/2023    CO2 27 05/31/2023     (H) 05/31/2023    CREATININE 1.65 (H) 05/31/2023    GLUCOSE 64 (L) 05/31/2023    ALKPHOS 119 05/28/2023    K 4.2 05/31/2023    PROT 7.3 05/28/2023     05/31/2023    AST 50 (H) 05/28/2023    ALT 29 05/28/2023    BUN 39 (H) 05/31/2023    ANIONGAP 11 05/31/2023    MG 1.72 05/29/2023    PHOS 4.1 05/31/2023    GGT 63 (H) 01/07/2021     04/30/2021    ALBUMIN 3.4 05/31/2023    LIPASE 30 01/24/2023     Lab Results   Component Value Date    TRIG 79 03/24/2023    CHOL 160 03/24/2023    HDL 70.9 03/24/2023     Lab Results   Component Value Date    HGBA1C 6.3 (A) 03/22/2023       DRUG INTERACTIONS  - No significant drug-drug interactions exist that require adjustment to therapy    CURRENT PHARMACOTHERAPY  - Eliquis 5 mg BID  - Trelegy 200-62.5-25 - 1 puff by mouth daily    PATIENT EDUCATION/DISCUSSION  - Spoke with patient regarding  PAP program. Patient is to provide financial documents to [facility] within the next two weeks.    - Patient verbally reports monthly or yearly income which is less than 400% federal poverty level  - Application for program has been submitted for the following medications:  Eliquis 5 mg BID and Trelegy 200-62.5-25 daily  - Patient has been informed that program team will be reaching out to them to discuss necessary documentation, instructed to answer phone/return voicemail.   - Patient aware this process may take up to 4 weeks.   - If approved medication must be filled through [facility] pharmacy and may be picked up or mailed to patient.      - Answered all patient questions and concerns; provided [facility] phone number if issues/questions arise    RECOMMENDATIONS/PLAN  Patient is to search home for social security form and daughter's 1040 form. Will follow-up in two weeks to ensure patient was able to find financial documentation and then pharmacy team will proceed.    2.  Continue: Continue all meds under the continuation of care with the referring provider and clinical pharmacy team.    Clinical Pharmacist follow up: 6/14/2023 @ 12:30 pm  Type of Encounter: Virtual    Thank you,  Stephanie Byers, Marina    Verbal consent to manage patient's drug therapy was obtained from patient. They were informed they may decline to participate or withdraw from participation in pharmacy services at any time.

## 2023-06-05 ENCOUNTER — NURSING HOME VISIT (OUTPATIENT)
Dept: POST ACUTE CARE | Facility: EXTERNAL LOCATION | Age: 63
End: 2023-06-05

## 2023-06-05 ENCOUNTER — DOCUMENTATION (OUTPATIENT)
Dept: PRIMARY CARE | Facility: CLINIC | Age: 63
End: 2023-06-05
Payer: MEDICARE

## 2023-06-05 DIAGNOSIS — E27.40 ADRENAL INSUFFICIENCY (MULTI): Primary | ICD-10-CM

## 2023-06-05 DIAGNOSIS — E78.5 HYPERLIPIDEMIA, UNSPECIFIED HYPERLIPIDEMIA TYPE: ICD-10-CM

## 2023-06-05 DIAGNOSIS — M32.19 LUPUS VASCULITIS (MULTI): ICD-10-CM

## 2023-06-05 DIAGNOSIS — M32.9 LUPUS (MULTI): ICD-10-CM

## 2023-06-05 DIAGNOSIS — E11.42 DIABETIC POLYNEUROPATHY ASSOCIATED WITH TYPE 2 DIABETES MELLITUS (MULTI): ICD-10-CM

## 2023-06-05 DIAGNOSIS — R26.2 AMBULATORY DYSFUNCTION: ICD-10-CM

## 2023-06-05 DIAGNOSIS — I77.89 LUPUS VASCULITIS (MULTI): ICD-10-CM

## 2023-06-05 DIAGNOSIS — D46.9 MYELODYSPLASTIC SYNDROME, UNSPECIFIED (MULTI): ICD-10-CM

## 2023-06-05 DIAGNOSIS — F41.9 ANXIETY: ICD-10-CM

## 2023-06-05 PROCEDURE — 99306 1ST NF CARE HIGH MDM 50: CPT | Performed by: STUDENT IN AN ORGANIZED HEALTH CARE EDUCATION/TRAINING PROGRAM

## 2023-06-05 NOTE — LETTER
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 diff (previously on pulse dose vancomycin) who was initially admitted to the hospital for generalized weakness and inability to care for self at home in the setting of poor oral intake. Initial lab finding was significant for JED on CKD. She received IV fluid during admission. Creatinine improved. Electrolytes were within normal limit. She had multiple episodes of hypoglycemia that was corrected with dextrose. She remained vitally stable with no major events during hospitalization. She was evaluated by PT/OT and was recommended for SNF for rehabilitation. Her prednisone dose was increased to 5mg daily from 2.5mg.   Today, she reports weakness is getting much better. She is working with occupational therapy but yet to work with physical therapy. No h/o falls or trauma. She is tolerating orally and has had breakfast.    ROS: all system reviewed and was negative  PMHx: As in HPI  PSHx: Multiple ankle surgery, cholecystectomy, pericardial window, eye surgery, hip replacement  SHx: former smoker, occasional alcohol use, denies illicit drug use  Physical Exam:  General: well appearing, alert, oriented & cooperative, not in distress  HEENT; atraumatic, PEERL, EOMI, moist membranes  Neck: supple, no JVD or LAD or thyromegaly  Lungs: Clear to auscultation bilaterally  Heart: RRR, normal S1/S2, no MRG  Abdomen: Soft, NT, ND, BS present, no organomegaly  Ext: mild pedal edema  Neuro: muscle strength 5/5 in the lower extremities, normal tone & reflexes. No spasticity or tremors. Unsteady gait. 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

## 2023-06-05 NOTE — PROGRESS NOTES
Chart review completed. Patient was discharged on 6/4/23 from [facility] to [facility]. Following patient in [facility]. Patient has an appt with PCP on 6/12, Most likely will still be admitted to SNF. I will check in a few days to see if she is still there and message office staff to cancel her upcoming appt.

## 2023-06-06 NOTE — PROGRESS NOTES
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 diff (previously on pulse dose vancomycin) who was initially admitted to the hospital for generalized weakness and inability to care for self at home in the setting of poor oral intake. Initial lab finding was significant for JED on CKD. She received IV fluid during admission. Creatinine improved. Electrolytes were within normal limit. She had multiple episodes of hypoglycemia that was corrected with dextrose. She remained vitally stable with no major events during hospitalization. She was evaluated by PT/OT and was recommended for SNF for rehabilitation. Her prednisone dose was increased to 5mg daily from 2.5mg.   Today, she reports weakness is getting much better. She is working with occupational therapy but yet to work with physical therapy. No h/o falls or trauma. She is tolerating orally and has had breakfast.    ROS: all system reviewed and was negative  PMHx: As in HPI  PSHx: Multiple ankle surgery, cholecystectomy, pericardial window, eye surgery, hip replacement  SHx: former smoker, occasional alcohol use, denies illicit drug use  Physical Exam:  General: well appearing, alert, oriented & cooperative, not in distress  HEENT; atraumatic, PEERL, EOMI, moist membranes  Neck: supple, no JVD or LAD or thyromegaly  Lungs: Clear to auscultation bilaterally  Heart: RRR, normal S1/S2, no MRG  Abdomen: Soft, NT, ND, BS present, no organomegaly  Ext: mild pedal edema  Neuro: muscle strength 5/5 in the lower extremities, normal tone & reflexes. No spasticity or tremors. Unsteady gait. 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

## 2023-06-07 ENCOUNTER — NURSING HOME VISIT (OUTPATIENT)
Dept: POST ACUTE CARE | Facility: EXTERNAL LOCATION | Age: 63
End: 2023-06-07
Payer: MEDICARE

## 2023-06-07 ENCOUNTER — DOCUMENTATION (OUTPATIENT)
Dept: PRIMARY CARE | Facility: CLINIC | Age: 63
End: 2023-06-07
Payer: MEDICARE

## 2023-06-07 DIAGNOSIS — M32.9 LUPUS (MULTI): ICD-10-CM

## 2023-06-07 DIAGNOSIS — E78.5 HYPERLIPIDEMIA, UNSPECIFIED HYPERLIPIDEMIA TYPE: ICD-10-CM

## 2023-06-07 DIAGNOSIS — R26.2 AMBULATORY DYSFUNCTION: ICD-10-CM

## 2023-06-07 DIAGNOSIS — I10 ESSENTIAL (PRIMARY) HYPERTENSION: ICD-10-CM

## 2023-06-07 DIAGNOSIS — I77.89 LUPUS VASCULITIS (MULTI): ICD-10-CM

## 2023-06-07 DIAGNOSIS — E11.42 DIABETIC POLYNEUROPATHY ASSOCIATED WITH TYPE 2 DIABETES MELLITUS (MULTI): ICD-10-CM

## 2023-06-07 DIAGNOSIS — D46.9 MYELODYSPLASTIC SYNDROME, UNSPECIFIED (MULTI): ICD-10-CM

## 2023-06-07 DIAGNOSIS — M32.19 LUPUS VASCULITIS (MULTI): ICD-10-CM

## 2023-06-07 DIAGNOSIS — E27.40 ADRENAL INSUFFICIENCY (MULTI): Primary | ICD-10-CM

## 2023-06-07 PROCEDURE — 99309 SBSQ NF CARE MODERATE MDM 30: CPT | Performed by: STUDENT IN AN ORGANIZED HEALTH CARE EDUCATION/TRAINING PROGRAM

## 2023-06-07 RX ORDER — FUROSEMIDE 20 MG/1
TABLET ORAL
Qty: 90 TABLET | Refills: 0 | Status: SHIPPED | OUTPATIENT
Start: 2023-06-07 | End: 2023-07-05

## 2023-06-07 NOTE — PROGRESS NOTES
Patient is still admitted to a SNF. I cancelled her appt on 6.12 with Dr. Hood and I messaged Pharm D letting them know she would be unavailable for her 6/14 telephone follow up. I will continue to follow patient in [facility].

## 2023-06-07 NOTE — LETTER
Patient: Bing Holliday  : 1960    Encounter Date: 2023    Subjective  Patient ID: Bing Holliday is a 62 y.o. female.    Pt seen resting in bed. She did have a rough night as she did have some hallucinations. This is however, very typical for her. She reports that in the past has been prescribed Prn medications. Does have a cough that recently started, States no additional issues or concerns.         Review of Systems   Constitutional: Negative.    HENT: Negative.     Respiratory: Negative.     Cardiovascular: Negative.    Gastrointestinal: Negative.    Musculoskeletal: Negative.    Neurological: Negative.    Psychiatric/Behavioral: Negative.          Hallucinations overnight       Objective    Vitals Reviewed via facility EMR   Physical Exam  Constitutional:       General: She is not in acute distress.     Appearance: She is not ill-appearing.   HENT:      Mouth/Throat:      Mouth: Mucous membranes are moist.      Pharynx: Oropharynx is clear. No oropharyngeal exudate or posterior oropharyngeal erythema.   Eyes:      Pupils: Pupils are equal, round, and reactive to light.   Cardiovascular:      Rate and Rhythm: Normal rate and regular rhythm.      Heart sounds: No murmur heard.  Pulmonary:      Effort: Pulmonary effort is normal. No respiratory distress.      Breath sounds: No wheezing.   Abdominal:      General: Abdomen is flat. Bowel sounds are normal. There is no distension.      Palpations: Abdomen is soft.      Tenderness: There is no abdominal tenderness.   Musculoskeletal:         General: No tenderness. Normal range of motion.   Skin:     General: Skin is warm and dry.   Neurological:      General: No focal deficit present.      Mental Status: She is alert and oriented to person, place, and time. Mental status is at baseline.   Psychiatric:         Mood and Affect: Mood normal.         Assessment/Plan  Diagnoses and all orders for this visit:  Adrenal insufficiency (CMS/HCC)  Lupus  vasculitis (CMS/HCC)  Ambulatory dysfunction  Hyperlipidemia, unspecified hyperlipidemia type  Lupus (CMS/HCC)  Myelodysplastic syndrome, unspecified (CMS/HCC)    Pt seen and examined. Noted cough, is being tested for covid 19 to ensure no infection, start prn cough medications as well as prn meds for hallucinations. Will rule out infectious causes. Supportive care, routine labs, pt/ot    >30 minutes spent in management of pt      Electronically Signed By: Ayaan Koehler DO   6/8/23  9:13 PM

## 2023-06-08 ASSESSMENT — ENCOUNTER SYMPTOMS
GASTROINTESTINAL NEGATIVE: 1
MUSCULOSKELETAL NEGATIVE: 1
CARDIOVASCULAR NEGATIVE: 1
CONSTITUTIONAL NEGATIVE: 1
PSYCHIATRIC NEGATIVE: 1
NEUROLOGICAL NEGATIVE: 1
RESPIRATORY NEGATIVE: 1

## 2023-06-09 NOTE — PROGRESS NOTES
Subjective   Patient ID: Bing Holliday is a 62 y.o. female.    Pt seen resting in bed. She did have a rough night as she did have some hallucinations. This is however, very typical for her. She reports that in the past has been prescribed Prn medications. Does have a cough that recently started, States no additional issues or concerns.         Review of Systems   Constitutional: Negative.    HENT: Negative.     Respiratory: Negative.     Cardiovascular: Negative.    Gastrointestinal: Negative.    Musculoskeletal: Negative.    Neurological: Negative.    Psychiatric/Behavioral: Negative.          Hallucinations overnight       Objective     Vitals Reviewed via facility EMR   Physical Exam  Constitutional:       General: She is not in acute distress.     Appearance: She is not ill-appearing.   HENT:      Mouth/Throat:      Mouth: Mucous membranes are moist.      Pharynx: Oropharynx is clear. No oropharyngeal exudate or posterior oropharyngeal erythema.   Eyes:      Pupils: Pupils are equal, round, and reactive to light.   Cardiovascular:      Rate and Rhythm: Normal rate and regular rhythm.      Heart sounds: No murmur heard.  Pulmonary:      Effort: Pulmonary effort is normal. No respiratory distress.      Breath sounds: No wheezing.   Abdominal:      General: Abdomen is flat. Bowel sounds are normal. There is no distension.      Palpations: Abdomen is soft.      Tenderness: There is no abdominal tenderness.   Musculoskeletal:         General: No tenderness. Normal range of motion.   Skin:     General: Skin is warm and dry.   Neurological:      General: No focal deficit present.      Mental Status: She is alert and oriented to person, place, and time. Mental status is at baseline.   Psychiatric:         Mood and Affect: Mood normal.         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 Noted cough, is being tested for covid 19 to ensure no infection, start prn cough medications as well as prn meds for hallucinations. Will rule out infectious causes. Supportive care, routine labs, pt/ot    >30 minutes spent in management of pt

## 2023-06-12 ENCOUNTER — APPOINTMENT (OUTPATIENT)
Dept: PRIMARY CARE | Facility: CLINIC | Age: 63
End: 2023-06-12
Payer: MEDICARE

## 2023-06-12 ENCOUNTER — NURSING HOME VISIT (OUTPATIENT)
Dept: POST ACUTE CARE | Facility: EXTERNAL LOCATION | Age: 63
End: 2023-06-12

## 2023-06-12 DIAGNOSIS — M32.9 LUPUS (MULTI): ICD-10-CM

## 2023-06-12 DIAGNOSIS — U07.1 COVID-19: ICD-10-CM

## 2023-06-12 DIAGNOSIS — I77.89 LUPUS VASCULITIS (MULTI): ICD-10-CM

## 2023-06-12 DIAGNOSIS — D46.9 MYELODYSPLASTIC SYNDROME, UNSPECIFIED (MULTI): ICD-10-CM

## 2023-06-12 DIAGNOSIS — E78.5 HYPERLIPIDEMIA, UNSPECIFIED HYPERLIPIDEMIA TYPE: ICD-10-CM

## 2023-06-12 DIAGNOSIS — J18.9 PNEUMONIA OF RIGHT UPPER LOBE DUE TO INFECTIOUS ORGANISM: Primary | ICD-10-CM

## 2023-06-12 DIAGNOSIS — M32.19 LUPUS VASCULITIS (MULTI): ICD-10-CM

## 2023-06-12 DIAGNOSIS — E27.40 ADRENAL INSUFFICIENCY (MULTI): ICD-10-CM

## 2023-06-12 PROCEDURE — 99309 SBSQ NF CARE MODERATE MDM 30: CPT | Performed by: STUDENT IN AN ORGANIZED HEALTH CARE EDUCATION/TRAINING PROGRAM

## 2023-06-12 ASSESSMENT — ENCOUNTER SYMPTOMS
GASTROINTESTINAL NEGATIVE: 1
CONSTITUTIONAL NEGATIVE: 1
PSYCHIATRIC NEGATIVE: 1
STRIDOR: 1
COUGH: 1
CARDIOVASCULAR NEGATIVE: 1
NEUROLOGICAL NEGATIVE: 1
NECK PAIN: 0

## 2023-06-12 NOTE — LETTER
Patient: Bing Holliday  : 1960    Encounter Date: 2023    Subjective  Patient ID: Bing Holliday is a 62 y.o. female who presents for No chief complaint on file..    Pt seen and examined. States that she is still having a cough. Mildly productive in nature and is constant. Regards no fevers, minimal improvement with tessalon pearls. Otherwise feels well without any issues.          Review of Systems   Constitutional: Negative.    HENT:  Positive for congestion and postnasal drip.    Respiratory:  Positive for cough and stridor.    Cardiovascular: Negative.    Gastrointestinal: Negative.    Musculoskeletal:  Negative for neck pain.   Skin: Negative.    Neurological: Negative.    Psychiatric/Behavioral: Negative.         Objective  Vitals Reviewed via facility EMR     Physical Exam  Constitutional:       General: She is not in acute distress.     Appearance: She is not ill-appearing.   HENT:      Mouth/Throat:      Mouth: Mucous membranes are moist.      Pharynx: Oropharynx is clear. No oropharyngeal exudate or posterior oropharyngeal erythema.   Eyes:      Pupils: Pupils are equal, round, and reactive to light.   Cardiovascular:      Rate and Rhythm: Normal rate and regular rhythm.      Heart sounds: No murmur heard.  Pulmonary:      Effort: No respiratory distress.      Breath sounds: Wheezing and rales present.   Abdominal:      General: Abdomen is flat. Bowel sounds are normal. There is no distension.      Palpations: Abdomen is soft.      Tenderness: There is no abdominal tenderness.   Musculoskeletal:         General: No tenderness. Normal range of motion.   Skin:     General: Skin is warm and dry.   Neurological:      General: No focal deficit present.      Mental Status: She is alert and oriented to person, place, and time. Mental status is at baseline.   Psychiatric:         Mood and Affect: Mood normal.         Assessment/Plan  Problem List Items Addressed This Visit          Respiratory     Pneumonia of right upper lobe due to infectious organism - Primary       Digestive    Adrenal insufficiency (CMS/HCC)       Infectious/Inflammatory    COVID-19    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HCC)       Other    Lupus (CMS/HCC)    Myelodysplastic syndrome, unspecified (CMS/HCC)    Hyperlipidemia   Pt seen and eamined, chest x-ray revealed new infiltrate, suspected pneumonia. Continue breathing treatments and exportants. Supprotive care, start on levaquin 750 every day for 7 days. Monitor labsrespiratory supportive    >30 minutes spent in management of pt           Electronically Signed By: Ayaan Koehler DO   6/12/23  9:32 PM

## 2023-06-13 NOTE — PROGRESS NOTES
Subjective   Patient ID: Bing Holliday is a 62 y.o. female who presents for No chief complaint on file..    Pt seen and examined. States that she is still having a cough. Mildly productive in nature and is constant. Regards no fevers, minimal improvement with tessalon pearls. Otherwise feels well without any issues.          Review of Systems   Constitutional: Negative.    HENT:  Positive for congestion and postnasal drip.    Respiratory:  Positive for cough and stridor.    Cardiovascular: Negative.    Gastrointestinal: Negative.    Musculoskeletal:  Negative for neck pain.   Skin: Negative.    Neurological: Negative.    Psychiatric/Behavioral: Negative.         Objective   Vitals Reviewed via facility EMR     Physical Exam  Constitutional:       General: She is not in acute distress.     Appearance: She is not ill-appearing.   HENT:      Mouth/Throat:      Mouth: Mucous membranes are moist.      Pharynx: Oropharynx is clear. No oropharyngeal exudate or posterior oropharyngeal erythema.   Eyes:      Pupils: Pupils are equal, round, and reactive to light.   Cardiovascular:      Rate and Rhythm: Normal rate and regular rhythm.      Heart sounds: No murmur heard.  Pulmonary:      Effort: No respiratory distress.      Breath sounds: Wheezing and rales present.   Abdominal:      General: Abdomen is flat. Bowel sounds are normal. There is no distension.      Palpations: Abdomen is soft.      Tenderness: There is no abdominal tenderness.   Musculoskeletal:         General: No tenderness. Normal range of motion.   Skin:     General: Skin is warm and dry.   Neurological:      General: No focal deficit present.      Mental Status: She is alert and oriented to person, place, and time. Mental status is at baseline.   Psychiatric:         Mood and Affect: Mood normal.         Assessment/Plan   Problem List Items Addressed This Visit          Respiratory    Pneumonia of right upper lobe due to infectious organism - Primary        Digestive    Adrenal insufficiency (CMS/HCC)       Infectious/Inflammatory    COVID-19    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HCC)       Other    Lupus (CMS/HCC)    Myelodysplastic syndrome, unspecified (CMS/HCC)    Hyperlipidemia   Pt seen and eamined, chest x-ray revealed new infiltrate, suspected pneumonia. Continue breathing treatments and exportants. Supprotive care, start on levaquin 750 every day for 7 days. Monitor labsrespiratory supportive    >30 minutes spent in management of pt

## 2023-06-14 ENCOUNTER — NURSING HOME VISIT (OUTPATIENT)
Dept: POST ACUTE CARE | Facility: EXTERNAL LOCATION | Age: 63
End: 2023-06-14

## 2023-06-14 ENCOUNTER — APPOINTMENT (OUTPATIENT)
Dept: PHARMACY | Facility: HOSPITAL | Age: 63
End: 2023-06-14
Payer: MEDICARE

## 2023-06-14 DIAGNOSIS — J18.9 PNEUMONIA OF RIGHT UPPER LOBE DUE TO INFECTIOUS ORGANISM: Primary | ICD-10-CM

## 2023-06-14 DIAGNOSIS — M32.9 LUPUS (MULTI): ICD-10-CM

## 2023-06-14 DIAGNOSIS — R26.2 AMBULATORY DYSFUNCTION: ICD-10-CM

## 2023-06-14 DIAGNOSIS — E78.5 HYPERLIPIDEMIA, UNSPECIFIED HYPERLIPIDEMIA TYPE: ICD-10-CM

## 2023-06-14 DIAGNOSIS — E27.40 ADRENAL INSUFFICIENCY (MULTI): ICD-10-CM

## 2023-06-14 PROCEDURE — 99308 SBSQ NF CARE LOW MDM 20: CPT | Performed by: STUDENT IN AN ORGANIZED HEALTH CARE EDUCATION/TRAINING PROGRAM

## 2023-06-14 ASSESSMENT — ENCOUNTER SYMPTOMS
CONSTITUTIONAL NEGATIVE: 1
CARDIOVASCULAR NEGATIVE: 1
MUSCULOSKELETAL NEGATIVE: 1
PSYCHIATRIC NEGATIVE: 1
GASTROINTESTINAL NEGATIVE: 1
COUGH: 1
NEUROLOGICAL NEGATIVE: 1

## 2023-06-14 NOTE — LETTER
Patient: Bing Holliday  : 1960    Encounter Date: 2023    Subjective  Patient ID: Bing Holliday is a 62 y.o. female.    Pt seen at bedside. She reports that overall she is doing much better then before. Breathing has been overall improved since start of antibiotics. She reports no additional complaints or concerns.         Review of Systems   Constitutional: Negative.    HENT: Negative.     Respiratory:  Positive for cough.    Cardiovascular: Negative.    Gastrointestinal: Negative.    Musculoskeletal: Negative.    Neurological: Negative.    Psychiatric/Behavioral: Negative.         ObjectiveVitals Reviewed via facility EMR   Physical Exam  Constitutional:       General: She is not in acute distress.     Appearance: She is not ill-appearing.   HENT:      Mouth/Throat:      Mouth: Mucous membranes are moist.      Pharynx: Oropharynx is clear. No oropharyngeal exudate or posterior oropharyngeal erythema.   Eyes:      Pupils: Pupils are equal, round, and reactive to light.   Cardiovascular:      Rate and Rhythm: Normal rate and regular rhythm.      Heart sounds: No murmur heard.  Pulmonary:      Effort: Pulmonary effort is normal. No respiratory distress.      Breath sounds: Wheezing present.      Comments: Mild wheezing RLL  Abdominal:      General: Abdomen is flat. Bowel sounds are normal. There is no distension.      Palpations: Abdomen is soft.      Tenderness: There is no abdominal tenderness.   Musculoskeletal:         General: No tenderness. Normal range of motion.   Skin:     General: Skin is warm and dry.   Neurological:      General: No focal deficit present.      Mental Status: She is alert and oriented to person, place, and time. Mental status is at baseline.   Psychiatric:         Mood and Affect: Mood normal.         Assessment/Plan  Diagnoses and all orders for this visit:  Pneumonia of right upper lobe due to infectious organism  Adrenal insufficiency (CMS/HCC)  Hyperlipidemia,  unspecified hyperlipidemia type  Lupus (CMS/HCC)  Ambulatory dysfunction  Pt seen and examined, improving on treatment for PNA, cpontinue abx, breathing treatments, supprotive care, weekly labs        Electronically Signed By: Ayaan Koehler DO   6/14/23  9:52 PM

## 2023-06-15 NOTE — PROGRESS NOTES
Subjective   Patient ID: Bing Holliday is a 62 y.o. female.    Pt seen at bedside. She reports that overall she is doing much better then before. Breathing has been overall improved since start of antibiotics. She reports no additional complaints or concerns.         Review of Systems   Constitutional: Negative.    HENT: Negative.     Respiratory:  Positive for cough.    Cardiovascular: Negative.    Gastrointestinal: Negative.    Musculoskeletal: Negative.    Neurological: Negative.    Psychiatric/Behavioral: Negative.         Objective Vitals Reviewed via facility EMR   Physical Exam  Constitutional:       General: She is not in acute distress.     Appearance: She is not ill-appearing.   HENT:      Mouth/Throat:      Mouth: Mucous membranes are moist.      Pharynx: Oropharynx is clear. No oropharyngeal exudate or posterior oropharyngeal erythema.   Eyes:      Pupils: Pupils are equal, round, and reactive to light.   Cardiovascular:      Rate and Rhythm: Normal rate and regular rhythm.      Heart sounds: No murmur heard.  Pulmonary:      Effort: Pulmonary effort is normal. No respiratory distress.      Breath sounds: Wheezing present.      Comments: Mild wheezing RLL  Abdominal:      General: Abdomen is flat. Bowel sounds are normal. There is no distension.      Palpations: Abdomen is soft.      Tenderness: There is no abdominal tenderness.   Musculoskeletal:         General: No tenderness. Normal range of motion.   Skin:     General: Skin is warm and dry.   Neurological:      General: No focal deficit present.      Mental Status: She is alert and oriented to person, place, and time. Mental status is at baseline.   Psychiatric:         Mood and Affect: Mood normal.         Assessment/Plan   Diagnoses and all orders for this visit:  Pneumonia of right upper lobe due to infectious organism  Adrenal insufficiency (CMS/HCC)  Hyperlipidemia, unspecified hyperlipidemia type  Lupus (CMS/HCC)  Ambulatory dysfunction  Pt  seen and examined, improving on treatment for PNA, cpontinue abx, breathing treatments, supprotive care, weekly labs

## 2023-06-19 ENCOUNTER — NURSING HOME VISIT (OUTPATIENT)
Dept: POST ACUTE CARE | Facility: EXTERNAL LOCATION | Age: 63
End: 2023-06-19
Payer: MEDICARE

## 2023-06-19 ENCOUNTER — DOCUMENTATION (OUTPATIENT)
Dept: PRIMARY CARE | Facility: CLINIC | Age: 63
End: 2023-06-19
Payer: MEDICARE

## 2023-06-19 DIAGNOSIS — R26.2 AMBULATORY DYSFUNCTION: ICD-10-CM

## 2023-06-19 DIAGNOSIS — J18.9 PNEUMONIA OF RIGHT UPPER LOBE DUE TO INFECTIOUS ORGANISM: ICD-10-CM

## 2023-06-19 DIAGNOSIS — E27.2 ADRENAL CRISIS (MULTI): Primary | ICD-10-CM

## 2023-06-19 DIAGNOSIS — E16.2 HYPOGLYCEMIA: ICD-10-CM

## 2023-06-19 DIAGNOSIS — I77.89 LUPUS VASCULITIS (MULTI): ICD-10-CM

## 2023-06-19 DIAGNOSIS — M32.19 LUPUS VASCULITIS (MULTI): ICD-10-CM

## 2023-06-19 PROCEDURE — 99310 SBSQ NF CARE HIGH MDM 45: CPT | Performed by: STUDENT IN AN ORGANIZED HEALTH CARE EDUCATION/TRAINING PROGRAM

## 2023-06-19 NOTE — LETTER
Patient: Bing Holliday  : 1960    Encounter Date: 2023    Subjective  Patient ID: Bing Holliday is a 62 y.o. female.    Pt seen and evaluated. This morning she is feeling very weak and confused. Does have a tremor as well. States that she is having more difficulty walking. Recently treated for pneumonia and feeling lightheaded at times. States no additional issues.        Review of Systems   Constitutional:  Positive for chills and fatigue.   HENT: Negative.     Respiratory: Negative.     Cardiovascular: Negative.    Gastrointestinal: Negative.    Musculoskeletal:  Positive for arthralgias, back pain and gait problem.   Neurological:  Positive for dizziness, tremors, weakness and headaches.   Psychiatric/Behavioral: Negative.         ObjectiveVitals Reviewed via facility EMR   Physical Exam  Constitutional:       General: She is not in acute distress.     Appearance: She is not ill-appearing.      Comments: Weak, orthostatic, confused   HENT:      Mouth/Throat:      Mouth: Mucous membranes are moist.      Pharynx: Oropharynx is clear. No oropharyngeal exudate or posterior oropharyngeal erythema.   Eyes:      Pupils: Pupils are equal, round, and reactive to light.   Cardiovascular:      Rate and Rhythm: Normal rate and regular rhythm.      Heart sounds: No murmur heard.  Pulmonary:      Effort: Pulmonary effort is normal. No respiratory distress.      Breath sounds: No wheezing.   Abdominal:      General: Abdomen is flat. Bowel sounds are normal. There is no distension.      Palpations: Abdomen is soft.      Tenderness: There is no abdominal tenderness.   Musculoskeletal:         General: No tenderness. Normal range of motion.   Skin:     General: Skin is warm and dry.   Neurological:      General: No focal deficit present.      Mental Status: She is alert. Mental status is at baseline.      Motor: Weakness present.      Gait: Gait abnormal.   Psychiatric:         Mood and Affect: Mood normal.          Assessment/Plan  Diagnoses and all orders for this visit:  Adrenal crisis (CMS/HCC)  Pneumonia of right upper lobe due to infectious organism  Hypoglycemia  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HCC)  Ambulatory dysfunction      Pt recently treated for pneumonia, is having hypoglycemia episodes and borderline hypotensive on evaluation. Suspect adrenal crisis with recent pneumonia event. Will start stress dose steroids, monitor blood sugar. Continue PT/OT, supportive care, bi-weekly labs    >45 minutes spent in management of pt      Electronically Signed By: Ayaan Koehler DO   6/22/23 10:12 AM

## 2023-06-20 ENCOUNTER — TELEMEDICINE (OUTPATIENT)
Dept: PHARMACY | Facility: HOSPITAL | Age: 63
End: 2023-06-20
Payer: MEDICARE

## 2023-06-20 DIAGNOSIS — Z79.899 MEDICATION MANAGEMENT: ICD-10-CM

## 2023-06-20 NOTE — PROGRESS NOTES
Pharmacist Clinic: SOLANGE Holliday was referred to the Clinical Pharmacy Team for financial assistance with Eliquis and Trelegy. Since last [facility] visit, patient has not yet obtained necessary financial documents, therefore cannot proceed with application. Patient is currently admitted at a SNF. Patient states her appeal to stay longer has been denied, so facility is attempting another appeal.    Referring Provider: Claudio Hood DO  Problem List Items Addressed This Visit    None  Visit Diagnoses       Medication management              MEDICATION ASSESSMENT    Allergies: Ace inhibitors, Penicillins, and Sulfa (sulfonamide antibiotics)     [id number] - [place], [place] - [address] AT [facility] [address]  [address] [address] [place] [address]  Phone: [phone] Fax: [phone]    LAB  Lab Results   Component Value Date    BILITOT 0.3 05/28/2023    CALCIUM 9.0 06/02/2023    CO2 24 06/02/2023     (H) 06/02/2023    CREATININE 1.79 (H) 06/02/2023    GLUCOSE 51 (LL) 06/02/2023    ALKPHOS 119 05/28/2023    K 4.4 06/02/2023    PROT 7.3 05/28/2023     06/02/2023    AST 50 (H) 05/28/2023    ALT 29 05/28/2023    BUN 46 (H) 06/02/2023    ANIONGAP 13 06/02/2023    MG 1.76 06/02/2023    PHOS 4.6 06/02/2023    GGT 63 (H) 01/07/2021     04/30/2021    ALBUMIN 3.2 (L) 06/02/2023    LIPASE 30 01/24/2023     Lab Results   Component Value Date    TRIG 79 03/24/2023    CHOL 160 03/24/2023    HDL 70.9 03/24/2023     Lab Results   Component Value Date    HGBA1C 6.3 (A) 03/22/2023       DRUG INTERACTIONS  - No significant drug-drug interactions exist that require adjustment to therapy    CURRENT PHARMACOTHERAPY  - Eliquis 5 mg BID  - Trelegy 200-62.5-25 - 1 puff by mouth daily    PATIENT EDUCATION/DISCUSSION  - Spoke with patient regarding  PAP program. Patient is to provide financial documents to [facility] within the next two weeks.    - Patient verbally reports monthly or yearly income which is less than 400%  federal poverty level  - Application for program has been submitted for the following medications: Eliquis 5 mg BID and Trelegy 200-62.5-25 daily  - Patient has been informed that program team will be reaching out to them to discuss necessary documentation, instructed to answer phone/return voicemail.   - Patient aware this process may take up to 4 weeks.   - If approved medication must be filled through [facility] pharmacy and may be picked up or mailed to patient.      - Answered all patient questions and concerns; provided [facility] phone number if issues/questions arise    RECOMMENDATIONS/PLAN  Patient is to provide financial documents to [facility]. Will follow-up in three weeks to ensure patient was able to find financial documentation and then pharmacy team will proceed.    2.  Continue: Continue all meds under the continuation of care with the referring provider and clinical pharmacy team.    Clinical Pharmacist follow up: 7/11/2023 @ 11:30 am  Type of Encounter: Virtual    Thank you,  Stephanie Byers, Marina    Verbal consent to manage patient's drug therapy was obtained from patient. They were informed they may decline to participate or withdraw from participation in pharmacy services at any time.

## 2023-06-21 ENCOUNTER — NURSING HOME VISIT (OUTPATIENT)
Dept: POST ACUTE CARE | Facility: EXTERNAL LOCATION | Age: 63
End: 2023-06-21
Payer: MEDICARE

## 2023-06-21 DIAGNOSIS — E16.2 HYPOGLYCEMIA: ICD-10-CM

## 2023-06-21 DIAGNOSIS — I77.89 LUPUS VASCULITIS (MULTI): ICD-10-CM

## 2023-06-21 DIAGNOSIS — M32.19 LUPUS VASCULITIS (MULTI): ICD-10-CM

## 2023-06-21 DIAGNOSIS — E27.2 ADRENAL CRISIS (MULTI): ICD-10-CM

## 2023-06-21 DIAGNOSIS — M32.9 LUPUS (MULTI): ICD-10-CM

## 2023-06-21 DIAGNOSIS — J18.9 PNEUMONIA OF RIGHT UPPER LOBE DUE TO INFECTIOUS ORGANISM: Primary | ICD-10-CM

## 2023-06-21 DIAGNOSIS — R26.2 AMBULATORY DYSFUNCTION: ICD-10-CM

## 2023-06-21 DIAGNOSIS — D46.9 MYELODYSPLASTIC SYNDROME, UNSPECIFIED (MULTI): ICD-10-CM

## 2023-06-21 PROCEDURE — 99309 SBSQ NF CARE MODERATE MDM 30: CPT | Performed by: STUDENT IN AN ORGANIZED HEALTH CARE EDUCATION/TRAINING PROGRAM

## 2023-06-21 NOTE — LETTER
Patient: Bing Holliday  : 1960    Encounter Date: 2023    Subjective  Patient ID: Bing Holliday is a 62 y.o. female.    Pt seen resting in bedside chair. She reports she is overall doing a bit better, still is weak and is having a tremor. Tolerating steroids, and strength somewhat improved. States no additional issues.        Review of Systems   Constitutional: Negative.    HENT: Negative.     Respiratory: Negative.     Cardiovascular: Negative.    Gastrointestinal: Negative.    Musculoskeletal: Negative.    Neurological: Negative.    Psychiatric/Behavioral: Negative.         ObjectiveVitals Reviewed via facility EMR   Physical Exam  Constitutional:       General: She is not in acute distress.     Appearance: She is not ill-appearing.   HENT:      Mouth/Throat:      Mouth: Mucous membranes are moist.      Pharynx: Oropharynx is clear. No oropharyngeal exudate or posterior oropharyngeal erythema.   Eyes:      Pupils: Pupils are equal, round, and reactive to light.   Cardiovascular:      Rate and Rhythm: Normal rate and regular rhythm.      Heart sounds: No murmur heard.  Pulmonary:      Effort: Pulmonary effort is normal. No respiratory distress.      Breath sounds: No wheezing.   Abdominal:      General: Abdomen is flat. Bowel sounds are normal. There is no distension.      Palpations: Abdomen is soft.      Tenderness: There is no abdominal tenderness.   Musculoskeletal:         General: No tenderness. Normal range of motion.   Skin:     General: Skin is warm and dry.   Neurological:      General: No focal deficit present.      Mental Status: She is alert and oriented to person, place, and time. Mental status is at baseline.      Comments: Resting tremor   Psychiatric:         Mood and Affect: Mood normal.         Assessment/Plan  Diagnoses and all orders for this visit:  Pneumonia of right upper lobe due to infectious organism  Adrenal crisis (CMS/HCC)  Hypoglycemia  Lupus vasculitis  (CMS/HCC)  Ambulatory dysfunction  Lupus (CMS/HCC)  Myelodysplastic syndrome, unspecified (CMS/HCC)    Tremor could be secondary to stress dose steroids, however, pt reports that she has been having this for a while. Recommend propranolol 20 mg for suspected essential tremor. Recommend fu with neurology, she is agreeable, monitor blood sugars, follow up on labs, supportive care    >30 minutes spent in management of pt      Electronically Signed By: Ayaan Koehler DO   6/22/23  9:58 PM

## 2023-06-22 PROBLEM — E16.2 HYPOGLYCEMIA: Status: ACTIVE | Noted: 2023-06-22

## 2023-06-22 PROBLEM — E27.2 ADRENAL CRISIS (MULTI): Status: ACTIVE | Noted: 2023-03-06

## 2023-06-22 ASSESSMENT — ENCOUNTER SYMPTOMS
RESPIRATORY NEGATIVE: 1
DIZZINESS: 1
CARDIOVASCULAR NEGATIVE: 1
HEADACHES: 1
WEAKNESS: 1
GASTROINTESTINAL NEGATIVE: 1
FATIGUE: 1
PSYCHIATRIC NEGATIVE: 1
MUSCULOSKELETAL NEGATIVE: 1
CONSTITUTIONAL NEGATIVE: 1
CHILLS: 1
BACK PAIN: 1
RESPIRATORY NEGATIVE: 1
ARTHRALGIAS: 1
CARDIOVASCULAR NEGATIVE: 1
TREMORS: 1
GASTROINTESTINAL NEGATIVE: 1
NEUROLOGICAL NEGATIVE: 1
PSYCHIATRIC NEGATIVE: 1

## 2023-06-22 NOTE — PROGRESS NOTES
Subjective   Patient ID: Bing Holliday is a 62 y.o. female.    Pt seen and evaluated. This morning she is feeling very weak and confused. Does have a tremor as well. States that she is having more difficulty walking. Recently treated for pneumonia and feeling lightheaded at times. States no additional issues.        Review of Systems   Constitutional:  Positive for chills and fatigue.   HENT: Negative.     Respiratory: Negative.     Cardiovascular: Negative.    Gastrointestinal: Negative.    Musculoskeletal:  Positive for arthralgias, back pain and gait problem.   Neurological:  Positive for dizziness, tremors, weakness and headaches.   Psychiatric/Behavioral: Negative.         Objective Vitals Reviewed via facility EMR   Physical Exam  Constitutional:       General: She is not in acute distress.     Appearance: She is not ill-appearing.      Comments: Weak, orthostatic, confused   HENT:      Mouth/Throat:      Mouth: Mucous membranes are moist.      Pharynx: Oropharynx is clear. No oropharyngeal exudate or posterior oropharyngeal erythema.   Eyes:      Pupils: Pupils are equal, round, and reactive to light.   Cardiovascular:      Rate and Rhythm: Normal rate and regular rhythm.      Heart sounds: No murmur heard.  Pulmonary:      Effort: Pulmonary effort is normal. No respiratory distress.      Breath sounds: No wheezing.   Abdominal:      General: Abdomen is flat. Bowel sounds are normal. There is no distension.      Palpations: Abdomen is soft.      Tenderness: There is no abdominal tenderness.   Musculoskeletal:         General: No tenderness. Normal range of motion.   Skin:     General: Skin is warm and dry.   Neurological:      General: No focal deficit present.      Mental Status: She is alert. Mental status is at baseline.      Motor: Weakness present.      Gait: Gait abnormal.   Psychiatric:         Mood and Affect: Mood normal.         Assessment/Plan   Diagnoses and all orders for this visit:  Adrenal  crisis (CMS/HCC)  Pneumonia of right upper lobe due to infectious organism  Hypoglycemia  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HCC)  Ambulatory dysfunction      Pt recently treated for pneumonia, is having hypoglycemia episodes and borderline hypotensive on evaluation. Suspect adrenal crisis with recent pneumonia event. Will start stress dose steroids, monitor blood sugar. Continue PT/OT, supportive care, bi-weekly labs    >45 minutes spent in management of pt

## 2023-06-23 NOTE — PROGRESS NOTES
Subjective   Patient ID: Bing Holliday is a 62 y.o. female.    Pt seen resting in bedside chair. She reports she is overall doing a bit better, still is weak and is having a tremor. Tolerating steroids, and strength somewhat improved. States no additional issues.        Review of Systems   Constitutional: Negative.    HENT: Negative.     Respiratory: Negative.     Cardiovascular: Negative.    Gastrointestinal: Negative.    Musculoskeletal: Negative.    Neurological: Negative.    Psychiatric/Behavioral: Negative.         Objective Vitals Reviewed via facility EMR   Physical Exam  Constitutional:       General: She is not in acute distress.     Appearance: She is not ill-appearing.   HENT:      Mouth/Throat:      Mouth: Mucous membranes are moist.      Pharynx: Oropharynx is clear. No oropharyngeal exudate or posterior oropharyngeal erythema.   Eyes:      Pupils: Pupils are equal, round, and reactive to light.   Cardiovascular:      Rate and Rhythm: Normal rate and regular rhythm.      Heart sounds: No murmur heard.  Pulmonary:      Effort: Pulmonary effort is normal. No respiratory distress.      Breath sounds: No wheezing.   Abdominal:      General: Abdomen is flat. Bowel sounds are normal. There is no distension.      Palpations: Abdomen is soft.      Tenderness: There is no abdominal tenderness.   Musculoskeletal:         General: No tenderness. Normal range of motion.   Skin:     General: Skin is warm and dry.   Neurological:      General: No focal deficit present.      Mental Status: She is alert and oriented to person, place, and time. Mental status is at baseline.      Comments: Resting tremor   Psychiatric:         Mood and Affect: Mood normal.         Assessment/Plan   Diagnoses and all orders for this visit:  Pneumonia of right upper lobe due to infectious organism  Adrenal crisis (CMS/HCC)  Hypoglycemia  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HCC)  Ambulatory dysfunction  Lupus (CMS/HCC)  Myelodysplastic syndrome,  unspecified (CMS/HCC)    Tremor could be secondary to stress dose steroids, however, pt reports that she has been having this for a while. Recommend propranolol 20 mg for suspected essential tremor. Recommend fu with neurology, she is agreeable, monitor blood sugars, follow up on labs, supportive care    >30 minutes spent in management of pt

## 2023-06-26 ENCOUNTER — NURSING HOME VISIT (OUTPATIENT)
Dept: POST ACUTE CARE | Facility: EXTERNAL LOCATION | Age: 63
End: 2023-06-26
Payer: MEDICARE

## 2023-06-26 DIAGNOSIS — M79.89 LEG SWELLING: ICD-10-CM

## 2023-06-26 DIAGNOSIS — E78.5 HYPERLIPIDEMIA, UNSPECIFIED HYPERLIPIDEMIA TYPE: ICD-10-CM

## 2023-06-26 DIAGNOSIS — E16.2 HYPOGLYCEMIA: ICD-10-CM

## 2023-06-26 DIAGNOSIS — E27.2 ADRENAL CRISIS (MULTI): Primary | ICD-10-CM

## 2023-06-26 DIAGNOSIS — R44.3 HALLUCINATIONS: ICD-10-CM

## 2023-06-26 DIAGNOSIS — D46.9 MYELODYSPLASTIC SYNDROME, UNSPECIFIED (MULTI): ICD-10-CM

## 2023-06-26 DIAGNOSIS — R26.2 AMBULATORY DYSFUNCTION: ICD-10-CM

## 2023-06-26 DIAGNOSIS — M32.9 LUPUS (MULTI): ICD-10-CM

## 2023-06-26 PROCEDURE — 99309 SBSQ NF CARE MODERATE MDM 30: CPT | Performed by: STUDENT IN AN ORGANIZED HEALTH CARE EDUCATION/TRAINING PROGRAM

## 2023-06-26 NOTE — LETTER
Patient: Bing Holliday  : 1960    Encounter Date: 2023    Subjective  Patient ID: Bing Holliday is a 62 y.o. female.    Patient seen and examined, reports that she is hallucinating again. This is chronic for her, labs noted to have worsening Cr as well as elevated sodium. She also reports that her leg is swelling as well.        Review of Systems   Constitutional: Negative.    HENT: Negative.     Respiratory: Negative.     Cardiovascular: Negative.    Gastrointestinal: Negative.    Musculoskeletal: Negative.    Neurological: Negative.    Psychiatric/Behavioral: Negative.         ObjectiveVitals Reviewed via facility EMR   Physical Exam  Constitutional:       General: She is not in acute distress.     Appearance: She is not ill-appearing.   HENT:      Mouth/Throat:      Mouth: Mucous membranes are moist.      Pharynx: Oropharynx is clear. No oropharyngeal exudate or posterior oropharyngeal erythema.   Eyes:      Pupils: Pupils are equal, round, and reactive to light.   Cardiovascular:      Rate and Rhythm: Normal rate and regular rhythm.      Heart sounds: No murmur heard.  Pulmonary:      Effort: Pulmonary effort is normal. No respiratory distress.      Breath sounds: No wheezing.   Abdominal:      General: Abdomen is flat. Bowel sounds are normal. There is no distension.      Palpations: Abdomen is soft.      Tenderness: There is no abdominal tenderness.   Musculoskeletal:         General: No tenderness. Normal range of motion.      Right lower leg: Edema present.   Skin:     General: Skin is warm and dry.   Neurological:      General: No focal deficit present.      Mental Status: She is alert and oriented to person, place, and time. Mental status is at baseline.   Psychiatric:         Mood and Affect: Mood normal.      Comments: hallucinations         Assessment/Plan  Diagnoses and all orders for this visit:  Adrenal crisis (CMS/[facility])  Hypoglycemia  Lupus (CMS/[facility])  Ambulatory  dysfunction  Hyperlipidemia, unspecified hyperlipidemia type  Myelodysplastic syndrome, unspecified (CMS/HCC)  Hallucinations  Leg swelling  Pt seen and evaluated, overall doing well but is having some issues. Will check ultrasound LE, and UA due to confusion, start IVF to help with Cr and Hypernatremia, start Risperdal Prn but also consult psych. Continue to closely monitor    >30 minutes spent in management of pt        Electronically Signed By: Ayaan Koehler DO   6/27/23  9:30 PM

## 2023-06-27 ENCOUNTER — PATIENT OUTREACH (OUTPATIENT)
Dept: PRIMARY CARE | Facility: CLINIC | Age: 63
End: 2023-06-27
Payer: MEDICARE

## 2023-06-27 DIAGNOSIS — E11.42 DIABETIC POLYNEUROPATHY ASSOCIATED WITH TYPE 2 DIABETES MELLITUS (MULTI): ICD-10-CM

## 2023-06-27 DIAGNOSIS — R26.2 AMBULATORY DYSFUNCTION: ICD-10-CM

## 2023-06-27 PROBLEM — M79.89 LEG SWELLING: Status: ACTIVE | Noted: 2023-06-27

## 2023-06-27 PROBLEM — R44.3 HALLUCINATIONS: Status: ACTIVE | Noted: 2023-06-27

## 2023-06-27 PROCEDURE — 99490 CHRNC CARE MGMT STAFF 1ST 20: CPT | Performed by: FAMILY MEDICINE

## 2023-06-27 ASSESSMENT — ENCOUNTER SYMPTOMS
CARDIOVASCULAR NEGATIVE: 1
GASTROINTESTINAL NEGATIVE: 1
CONSTITUTIONAL NEGATIVE: 1
RESPIRATORY NEGATIVE: 1
MUSCULOSKELETAL NEGATIVE: 1
NEUROLOGICAL NEGATIVE: 1
PSYCHIATRIC NEGATIVE: 1

## 2023-06-27 NOTE — PROGRESS NOTES
Chart review complete. Handoff received from AMINAH Pop.  Patient outreach complete. Introduced myself as new chronic care manager and provided with my contact information.  Patient is currently in a SNF due to previous admission of feeling weak. Was discharged to [facility]. While there today she said they told her she had decreased kidney function and are currently giving her IV fluids. Patient states she is still feeling weak but not as much as before. She engages in PT while there and says they are pleased with her progress.   Denies any needs at this time. Patient aware I will reach out again when she is discharged from SNF. Plan is to go home with daughter. Use of a walker.

## 2023-06-28 ENCOUNTER — NURSING HOME VISIT (OUTPATIENT)
Dept: POST ACUTE CARE | Facility: EXTERNAL LOCATION | Age: 63
End: 2023-06-28
Payer: MEDICARE

## 2023-06-28 DIAGNOSIS — J18.9 PNEUMONIA OF RIGHT UPPER LOBE DUE TO INFECTIOUS ORGANISM: ICD-10-CM

## 2023-06-28 DIAGNOSIS — N17.9 AKI (ACUTE KIDNEY INJURY) (CMS-HCC): ICD-10-CM

## 2023-06-28 DIAGNOSIS — E87.0 HYPERNATREMIA: ICD-10-CM

## 2023-06-28 DIAGNOSIS — R26.2 AMBULATORY DYSFUNCTION: ICD-10-CM

## 2023-06-28 DIAGNOSIS — M79.89 LEG SWELLING: ICD-10-CM

## 2023-06-28 DIAGNOSIS — E27.2 ADRENAL CRISIS (MULTI): Primary | ICD-10-CM

## 2023-06-28 PROBLEM — R73.9 HYPERGLYCEMIA: Status: ACTIVE | Noted: 2023-06-28

## 2023-06-28 PROCEDURE — 99309 SBSQ NF CARE MODERATE MDM 30: CPT | Performed by: STUDENT IN AN ORGANIZED HEALTH CARE EDUCATION/TRAINING PROGRAM

## 2023-06-28 ASSESSMENT — ENCOUNTER SYMPTOMS
WEAKNESS: 1
CARDIOVASCULAR NEGATIVE: 1
RESPIRATORY NEGATIVE: 1
PSYCHIATRIC NEGATIVE: 1
MUSCULOSKELETAL NEGATIVE: 1
LIGHT-HEADEDNESS: 1
GASTROINTESTINAL NEGATIVE: 1
FATIGUE: 1

## 2023-06-28 NOTE — PROGRESS NOTES
Subjective   Patient ID: Bing Holliday is a 62 y.o. female.    Patient seen and examined, reports that she is hallucinating again. This is chronic for her, labs noted to have worsening Cr as well as elevated sodium. She also reports that her leg is swelling as well.        Review of Systems   Constitutional: Negative.    HENT: Negative.     Respiratory: Negative.     Cardiovascular: Negative.    Gastrointestinal: Negative.    Musculoskeletal: Negative.    Neurological: Negative.    Psychiatric/Behavioral: Negative.         Objective Vitals Reviewed via facility EMR   Physical Exam  Constitutional:       General: She is not in acute distress.     Appearance: She is not ill-appearing.   HENT:      Mouth/Throat:      Mouth: Mucous membranes are moist.      Pharynx: Oropharynx is clear. No oropharyngeal exudate or posterior oropharyngeal erythema.   Eyes:      Pupils: Pupils are equal, round, and reactive to light.   Cardiovascular:      Rate and Rhythm: Normal rate and regular rhythm.      Heart sounds: No murmur heard.  Pulmonary:      Effort: Pulmonary effort is normal. No respiratory distress.      Breath sounds: No wheezing.   Abdominal:      General: Abdomen is flat. Bowel sounds are normal. There is no distension.      Palpations: Abdomen is soft.      Tenderness: There is no abdominal tenderness.   Musculoskeletal:         General: No tenderness. Normal range of motion.      Right lower leg: Edema present.   Skin:     General: Skin is warm and dry.   Neurological:      General: No focal deficit present.      Mental Status: She is alert and oriented to person, place, and time. Mental status is at baseline.   Psychiatric:         Mood and Affect: Mood normal.      Comments: hallucinations         Assessment/Plan   Diagnoses and all orders for this visit:  Adrenal crisis (CMS/HCC)  Hypoglycemia  Lupus (CMS/HCC)  Ambulatory dysfunction  Hyperlipidemia, unspecified hyperlipidemia type  Myelodysplastic syndrome,  unspecified (CMS/HCC)  Hallucinations  Leg swelling  Pt seen and evaluated, overall doing well but is having some issues. Will check ultrasound LE, and UA due to confusion, start IVF to help with Cr and Hypernatremia, start Risperdal Prn but also consult psych. Continue to closely monitor    >30 minutes spent in management of pt

## 2023-06-28 NOTE — LETTER
Patient: Bing Holliday  : 1960    Encounter Date: 2023    Subjective  Patient ID: Bing Holliday is a 62 y.o. female.    Pt seen and examined, hallucinations have improved however she reports that she is still having issues with dizziness and weakness. Regards that IVF did help her symptoms but overall she does not feel at her baseline. Regards no other constitutional symptoms and overall feels well otherwise.        Review of Systems   Constitutional:  Positive for fatigue.   HENT: Negative.     Respiratory: Negative.     Cardiovascular: Negative.    Gastrointestinal: Negative.    Musculoskeletal: Negative.    Neurological:  Positive for weakness and light-headedness.   Psychiatric/Behavioral: Negative.         ObjectiveVitals Reviewed via facility EMR   Physical Exam  Constitutional:       General: She is not in acute distress.     Appearance: She is not ill-appearing.   HENT:      Mouth/Throat:      Mouth: Mucous membranes are dry.      Pharynx: Oropharynx is clear. No oropharyngeal exudate or posterior oropharyngeal erythema.   Eyes:      Pupils: Pupils are equal, round, and reactive to light.   Cardiovascular:      Rate and Rhythm: Normal rate and regular rhythm.      Heart sounds: No murmur heard.  Pulmonary:      Effort: Pulmonary effort is normal. No respiratory distress.      Breath sounds: No wheezing.   Abdominal:      General: Abdomen is flat. Bowel sounds are normal. There is no distension.      Palpations: Abdomen is soft.      Tenderness: There is no abdominal tenderness.   Musculoskeletal:         General: No tenderness. Normal range of motion.      Right lower leg: Edema present.      Left lower leg: Edema present.   Skin:     General: Skin is warm and dry.   Neurological:      General: No focal deficit present.      Mental Status: She is alert and oriented to person, place, and time. Mental status is at baseline.   Psychiatric:         Mood and Affect: Mood normal.          Assessment/Plan  Diagnoses and all orders for this visit:  Adrenal crisis (CMS/HCC)  Ambulatory dysfunction  Leg swelling  Pneumonia of right upper lobe due to infectious organism  JED (acute kidney injury) (CMS/HCC)  Hypernatremia        Pt seen and examined, noted JED and hypernatremia, suspected dehydration and hypovolemia. Blood sugars were briefly greater then 500, start ISS suspect secondary to steroid use. Continue supportive care, PT/OT, monitor labs closely    >30 minutes spent in management of pt      Electronically Signed By: Ayaan Koehler DO   6/28/23  9:35 PM

## 2023-06-29 NOTE — PROGRESS NOTES
Subjective   Patient ID: Bing Holliday is a 62 y.o. female.    Pt seen and examined, hallucinations have improved however she reports that she is still having issues with dizziness and weakness. Regards that IVF did help her symptoms but overall she does not feel at her baseline. Regards no other constitutional symptoms and overall feels well otherwise.        Review of Systems   Constitutional:  Positive for fatigue.   HENT: Negative.     Respiratory: Negative.     Cardiovascular: Negative.    Gastrointestinal: Negative.    Musculoskeletal: Negative.    Neurological:  Positive for weakness and light-headedness.   Psychiatric/Behavioral: Negative.         Objective Vitals Reviewed via facility EMR   Physical Exam  Constitutional:       General: She is not in acute distress.     Appearance: She is not ill-appearing.   HENT:      Mouth/Throat:      Mouth: Mucous membranes are dry.      Pharynx: Oropharynx is clear. No oropharyngeal exudate or posterior oropharyngeal erythema.   Eyes:      Pupils: Pupils are equal, round, and reactive to light.   Cardiovascular:      Rate and Rhythm: Normal rate and regular rhythm.      Heart sounds: No murmur heard.  Pulmonary:      Effort: Pulmonary effort is normal. No respiratory distress.      Breath sounds: No wheezing.   Abdominal:      General: Abdomen is flat. Bowel sounds are normal. There is no distension.      Palpations: Abdomen is soft.      Tenderness: There is no abdominal tenderness.   Musculoskeletal:         General: No tenderness. Normal range of motion.      Right lower leg: Edema present.      Left lower leg: Edema present.   Skin:     General: Skin is warm and dry.   Neurological:      General: No focal deficit present.      Mental Status: She is alert and oriented to person, place, and time. Mental status is at baseline.   Psychiatric:         Mood and Affect: Mood normal.         Assessment/Plan   Diagnoses and all orders for this visit:  Adrenal crisis  (CMS/[facility])  Ambulatory dysfunction  Leg swelling  Pneumonia of right upper lobe due to infectious organism  JED (acute kidney injury) (CMS/[facility])  Hypernatremia        Pt seen and examined, noted JED and hypernatremia, suspected dehydration and hypovolemia. Blood sugars were briefly greater then 500, start ISS suspect secondary to steroid use. Continue supportive care, PT/OT, monitor labs closely    >30 minutes spent in management of pt

## 2023-07-03 ENCOUNTER — DOCUMENTATION (OUTPATIENT)
Dept: PRIMARY CARE | Facility: CLINIC | Age: 63
End: 2023-07-03
Payer: MEDICARE

## 2023-07-03 DIAGNOSIS — J18.9 PNEUMONIA OF RIGHT UPPER LOBE DUE TO INFECTIOUS ORGANISM: ICD-10-CM

## 2023-07-03 DIAGNOSIS — I10 PRIMARY HYPERTENSION: ICD-10-CM

## 2023-07-03 DIAGNOSIS — I48.91 ATRIAL FIBRILLATION, UNSPECIFIED TYPE (MULTI): ICD-10-CM

## 2023-07-03 DIAGNOSIS — E11.65 UNCONTROLLED DIABETES MELLITUS WITH HYPERGLYCEMIA, WITHOUT LONG-TERM CURRENT USE OF INSULIN (MULTI): ICD-10-CM

## 2023-07-03 DIAGNOSIS — D64.9 ANEMIA, UNSPECIFIED: ICD-10-CM

## 2023-07-03 NOTE — PROGRESS NOTES
Discharge facility: [facility]  Discharge diagnosis: Weakness generalized   Hospital Admission date: 5/27/23  Hospital Discharge date: 6/4/23  SNF Admission: 6/4/23  SNF Discharge: 6/30/23    PCP Appointment Date: 7/7/23  Hospital Encounter and Summary: not available at this time  See Discharge assessment below for further details      Engagement  Call Start Time: 1309 (7/5/2023  1:09 PM)    Medications  Medications reviewed with patient/caregiver?: Yes (7/5/2023  1:09 PM)  Is the patient having any side effects they believe may be caused by any medication additions or changes?: No (7/5/2023  1:09 PM)  Does the patient have all medications ordered at discharge?: No (7/5/2023  1:09 PM)  What is keeping the patient from filling the prescriptions?: Lost script/didn't receive (7/5/2023  1:09 PM)  Care Management Interventions: Provided patient education (7/5/2023  1:09 PM)  Prescription Comments: refilled and added new medicaations to med review. (7/5/2023  1:09 PM)  Is the patient taking all medications as directed (includes completed medication regime)?: Yes (7/5/2023  1:09 PM)  Care Management Interventions: Provided patient education (7/5/2023  1:09 PM)  Follow Up Tasks: Script issues (7/5/2023  1:09 PM)    Appointments  Does the patient have a primary care provider?: Yes (7/5/2023  1:09 PM)  Care Management Interventions: Verified appointment date/time/provider; Educated patient on importance of making appointment (7/5/2023  1:09 PM)  Has the patient kept scheduled appointments due by today?: Yes (7/5/2023  1:09 PM)  Care Management Interventions: Advised patient to keep appointment; Educated on importance of keeping appointment (7/5/2023  1:09 PM)  Follow Up Tasks: Appointments (7/5/2023  1:09 PM)    Self Management  What is the home health agency?: [facility] (7/5/2023  1:09 PM)  Has home health visited the patient within 72 hours of discharge?: No (patient states they have  called. She has yet to call back. Encouraged patient to give them a call.) (7/5/2023  1:09 PM)  Care Management Interventions: Notified PCP/provider (7/5/2023  1:09 PM)  Follow Up Tasks: Home Health (7/5/2023  1:09 PM)    Patient Teaching  Does the patient have access to their discharge instructions?: Yes (7/5/2023  1:09 PM)  Care Management Interventions: Reviewed instructions with patient; Educated on MyChart (7/5/2023  1:09 PM)  What is the patient's perception of their health status since discharge?: Improving (7/5/2023  1:09 PM)  Is the patient/caregiver able to teach back the hierarchy of who to call/visit for symptoms/problems? PCP, Specialist, Home Health nurse, Urgent Care, ED, 911: Yes (7/5/2023  1:09 PM)    Wrap Up  Is the patient/caregiver familiar with Advance Care Planning?: Yes (7/5/2023  1:09 PM)  Would the patient like more information on Advance Care Planning?: No (7/5/2023  1:09 PM)  Wrap Up Additional Comments: Patient is currently at home with son. Patient states she is enrolled in [facility] assistance. However they have not come yet. Patient states they called her but she has not called them back. I encouraged patient to call once we got off the phone so that she can set up appointments. Patient stated she would. Patient originally went into the hospital back in may due to generalized weakness. She was discharged to [facility] where she has been for about a month till being discharged to home with her son on 6/30. Patient states she is feeling better. Says they have changed her medications around. Patient and I reviewed medications extensively. However, I have requested a copy of the discharge paperwork from [facility] several times and have yet to get it. Patient requested ordering of Eliquis, Propanolol, and test strips. Says she has been unable to closely monitor her glucose level due to being out of strips. PAtient also does have some complaints of shortness of breath. I have created the orders for  Dr. Hood to review and sign. We set up an appointment with her PCP for Friay as well. Patient is aware of importance of keeping appointment. Denies further needs and states she will call if she needs anything further. (7/5/2023  1:09 PM)  Call End Time: 1342 (7/5/2023  1:09 PM)

## 2023-07-05 RX ORDER — PROPRANOLOL HYDROCHLORIDE 20 MG/1
20 TABLET ORAL DAILY
Qty: 90 TABLET | Refills: 3 | Status: CANCELLED | OUTPATIENT
Start: 2023-07-05

## 2023-07-05 RX ORDER — ACETAMINOPHEN 500 MG
1 TABLET ORAL NIGHTLY
Status: ON HOLD | COMMUNITY

## 2023-07-05 RX ORDER — PROPRANOLOL HYDROCHLORIDE 20 MG/1
20 TABLET ORAL DAILY
COMMUNITY
End: 2023-07-07 | Stop reason: CLARIF

## 2023-07-05 RX ORDER — BLOOD-GLUCOSE METER
EACH MISCELLANEOUS
Qty: 120 STRIP | Refills: 11 | Status: SHIPPED | OUTPATIENT
Start: 2023-07-05 | End: 2023-11-30 | Stop reason: ALTCHOICE

## 2023-07-05 RX ORDER — FOLIC ACID 1 MG/1
TABLET ORAL
Qty: 90 TABLET | Refills: 1 | Status: SHIPPED | OUTPATIENT
Start: 2023-07-05 | End: 2024-01-03

## 2023-07-05 RX ORDER — INSULIN LISPRO 100 [IU]/ML
100 INJECTION, SOLUTION INTRAVENOUS; SUBCUTANEOUS
COMMUNITY
End: 2023-08-01 | Stop reason: ALTCHOICE

## 2023-07-05 RX ORDER — ALBUTEROL SULFATE 90 UG/1
2 AEROSOL, METERED RESPIRATORY (INHALATION) EVERY 4 HOURS PRN
Qty: 8.5 G | Refills: 5 | Status: CANCELLED | OUTPATIENT
Start: 2023-07-05 | End: 2024-07-04

## 2023-07-05 RX ORDER — PREDNISONE 5 MG/1
5 TABLET ORAL DAILY
COMMUNITY
Start: 2023-06-04 | End: 2023-09-14 | Stop reason: SDUPTHER

## 2023-07-05 RX ORDER — RISPERIDONE 0.5 MG/1
0.5 TABLET ORAL DAILY
Status: ON HOLD | COMMUNITY
Start: 2022-12-21 | End: 2023-10-20 | Stop reason: ENTERED-IN-ERROR

## 2023-07-05 RX ORDER — PREDNISONE 10 MG/1
1 TABLET ORAL DAILY
COMMUNITY
Start: 2023-03-04 | End: 2023-07-07 | Stop reason: ALTCHOICE

## 2023-07-05 NOTE — PROGRESS NOTES
Outreached to patient to review past discussion.     Aware orders have been placed for test strips and Eliquis refill. Patient states understanding that she should not take insulin without knowing her blood sugar levels. She said she just ran out of stips this morning. Patient is aware order is placed but unable to say she'd be able to get them today. I advised patient to go buy some strips in the interim. Especially considering patient states she does not feel symptoms of hypoglycemia or hyperglycemia.     Patient educated on symptoms even though she states she does not normally feel them even if she is high or low. Advised to call 911 if she does. She states understanding of all education.     Ava discussed her symptom of shortness of breath. She states it worsens with activity and is better at rest but still remains. Her SpO2 is at 98%. Patient states understanding that if it were to get worse or her blood oxygen level were to fall under 92% she is advised to go to ER.     As the office still does not have access to her discharge instructions from [facility], patient understands she shall bring them with her to her appointment Friday.     Denies further needs.

## 2023-07-06 ENCOUNTER — TELEPHONE (OUTPATIENT)
Dept: PRIMARY CARE | Facility: CLINIC | Age: 63
End: 2023-07-06
Payer: MEDICARE

## 2023-07-06 DIAGNOSIS — E27.40 ADRENAL INSUFFICIENCY (MULTI): ICD-10-CM

## 2023-07-06 DIAGNOSIS — E78.5 HYPERLIPIDEMIA, UNSPECIFIED HYPERLIPIDEMIA TYPE: ICD-10-CM

## 2023-07-06 NOTE — TELEPHONE ENCOUNTER
BRIAN RETURNED CALL - WONDERING IF MESSAGE WAS ADDRESSED - GAVE HIM THE MESSAGE - HE DIDN'T KNOW WHEN PATIENT WANTED HER PRESCRIPTIONS SENT TO - BUT ALSO DID WANT YOU TO KNOW THAT HE WAS UNABLE TO DRAW HER LABS - SHE IS A HARD STICK BECAUSE OF THE LUPUS.    HAS APPT WITH YOU TOMORROW 7/7/23

## 2023-07-06 NOTE — TELEPHONE ENCOUNTER
Krystle from [facility] care is calling because Bing was just discharged from rehab and she has lower extremity edema. When she walks extended periods of time, she gets SOB. Resting pulse is between 98-99%.He doesn't think she needs to go to the hospital but wanted to know what you wanted her to do. The rehab facility took her off of her Lasix 6/4/23. She has an appointment with you on 7/7/23  Please advise  Thanks      Krystle [facility] # [phone]

## 2023-07-07 ENCOUNTER — PATIENT OUTREACH (OUTPATIENT)
Dept: PRIMARY CARE | Facility: CLINIC | Age: 63
End: 2023-07-07

## 2023-07-07 ENCOUNTER — OFFICE VISIT (OUTPATIENT)
Dept: PRIMARY CARE | Facility: CLINIC | Age: 63
End: 2023-07-07
Payer: MEDICARE

## 2023-07-07 VITALS
TEMPERATURE: 97.4 F | HEIGHT: 69 IN | SYSTOLIC BLOOD PRESSURE: 102 MMHG | BODY MASS INDEX: 32.14 KG/M2 | DIASTOLIC BLOOD PRESSURE: 64 MMHG | WEIGHT: 217 LBS | RESPIRATION RATE: 16 BRPM | HEART RATE: 79 BPM | OXYGEN SATURATION: 95 %

## 2023-07-07 DIAGNOSIS — N17.9 AKI (ACUTE KIDNEY INJURY) (CMS-HCC): ICD-10-CM

## 2023-07-07 DIAGNOSIS — I48.91 ATRIAL FIBRILLATION, UNSPECIFIED TYPE (MULTI): ICD-10-CM

## 2023-07-07 DIAGNOSIS — R60.0 BILATERAL LEG EDEMA: ICD-10-CM

## 2023-07-07 DIAGNOSIS — Z09 HOSPITAL DISCHARGE FOLLOW-UP: ICD-10-CM

## 2023-07-07 DIAGNOSIS — R60.0 BILATERAL LEG EDEMA: Primary | ICD-10-CM

## 2023-07-07 DIAGNOSIS — E66.09 CLASS 1 OBESITY DUE TO EXCESS CALORIES WITH SERIOUS COMORBIDITY AND BODY MASS INDEX (BMI) OF 32.0 TO 32.9 IN ADULT: ICD-10-CM

## 2023-07-07 PROCEDURE — 99439 CHRNC CARE MGMT STAF EA ADDL: CPT | Performed by: FAMILY MEDICINE

## 2023-07-07 PROCEDURE — 99490 CHRNC CARE MGMT STAFF 1ST 20: CPT | Performed by: FAMILY MEDICINE

## 2023-07-07 PROCEDURE — 1036F TOBACCO NON-USER: CPT | Performed by: FAMILY MEDICINE

## 2023-07-07 PROCEDURE — 99495 TRANSJ CARE MGMT MOD F2F 14D: CPT | Performed by: FAMILY MEDICINE

## 2023-07-07 RX ORDER — TORSEMIDE 10 MG/1
10 TABLET ORAL DAILY
Qty: 30 TABLET | Refills: 2 | Status: SHIPPED | OUTPATIENT
Start: 2023-07-07 | End: 2023-09-13

## 2023-07-07 RX ORDER — METOPROLOL TARTRATE 25 MG/1
25 TABLET, FILM COATED ORAL 2 TIMES DAILY
Qty: 60 TABLET | Refills: 5 | Status: SHIPPED | OUTPATIENT
Start: 2023-07-07 | End: 2023-08-01

## 2023-07-07 NOTE — PROGRESS NOTES
Patient in office today. I reviewed patient chart and constructed a folder with educational materials for patient to be sent home with. Also included paper regarding CCM with my contact information.     I met with patient and oversaw her appointment with Dr. Hood. Patient was brought to appointment by her son López. Patient is ambulatory with a walker. Bilateral legs edematous. See Dr. Hood's notes regarding visit.     Medications were reviewed and adjusted. Patient is missing one page. I am still trying to get in contact with [facility] for a copy of this med rec. Once I get it Dr. Hood will adjust any orders further.    After the visit with the PCP I spoke individually with the patient and her son. We discussed blood sugar levels as she was discharged from [place] on a sliding scale for insulin. She had ran out of test strips on Wednesday morning. Since patient states she has gotten more and her sugars have been 100-110. Patient has not had to take any insulin. Son did ask if she would qualify for a free standing andrea. We discussed that insulin use is likely not a long term condition due to prednisone use in SNF she was prescribed. Aware patient should still be checking sugars otherwise and I will look into andrea. Patient and family state understanding I will give them a call on Wednesday (7/12) to follow up on how things are going.    Call placed to [facility] to get a copy of med rec. Spoke to Ar who is supposed to be faxing information.

## 2023-07-07 NOTE — PROGRESS NOTES
"Subjective   Patient ID: Bing Holliday is a 62 y.o. female who presents for Hospital Follow-up.    [facility]    Hospital follow up.   KRISTAL complete.   Admitted to [facility].  Admission dates 5/27/23 to 6/4/2 than transfer to [facility] for 1 month  Admitted for acute kidney failure.  Days since discharge.  Patient had BW, XR chest, EKG  Patient advised to follow up with PCP.    Patient was prescribed pantoprazole    Pt is having SOB and swelling in both leg since she was in the hospital  Pt want to discuss getting back on lasix for water retention          No other concern  Review of systems  ; Patient seen today for exam denies any problems with headaches or vision, denies any shortness of breath chest pain nausea or vomiting, no black stool no blood in the stool no heartburn type symptoms denies any problems with constipation or diarrhea, and no dysuria-type symptoms    The patient's allergies medications were reviewed with them today    The patient's social family and surgical history or also reviewed here today, along with her past medical history.     Objective     Alert and active in  no acute distress  HEENT TMs clear oropharynx negative nares clear no drainage noted neck supple  With no adenopathy   Heart irregular rate with tachycardic rhythm without murmur and no carotid bruits  Lungs- clear to auscultation bilaterally, no wheezing fluids in bilateral bases thyroid -negative masses or nodularity  Abdomen- soft times four quadrants , bowel sounds positive no masses or organomegaly, negative tenderness guarding or rebound  Neurological exam unremarkable- DTRs in upper and lower extremities within normal limits.   skin -no lesions noted    Legs with edema 2+ left greater than right      /64 (BP Location: Right arm, Patient Position: Sitting, BP Cuff Size: Large adult)   Pulse 79   Temp 36.3 °C (97.4 °F) (Temporal)   Resp 16   Ht 1.753 m (5' 9\")   Wt 98.4 kg (217 lb)   SpO2 95%   BMI 32.05 " kg/m²     Allergies   Allergen Reactions    Ace Inhibitors Other    Penicillins Unknown     tolerates cephalosporins    From Childhood    Mbr sts that she was told since young that she is allergic    Sulfa (Sulfonamide Antibiotics) Hives       Assessment/Plan   Problem List Items Addressed This Visit       JED (acute kidney injury) (CMS/[facility])     Other Visit Diagnoses       Bilateral leg edema    -  Primary    Relevant Medications    torsemide (Demadex) 10 mg tablet    Hospital discharge follow-up        Atrial fibrillation, unspecified type (CMS/[facility])        Relevant Medications    apixaban (Eliquis) 2.5 mg tablet    metoprolol tartrate (Lopressor) 25 mg tablet    BMI 32.0-32.9,adult        Class 1 obesity due to excess calories with serious comorbidity and body mass index (BMI) of 32.0 to 32.9 in adult              Reviewed BW, XR chest, EKG.    Discussed with her how Lasix was not good for her kidneys when she was taking it.    She should move her feet or walk as able to help prevent blood clots.    Decrease Eliquis to 2.5 mg BID.    Stop Propranolol.    Metoprolol 25 mg BID prescribed today.  Torsemide 10 mg prescribed today.    Be sure to increase fluids.  Can become dehydrated with water pill.    Let us know how legs are doing over the next week.    If anything worsens or changes please call us at once, follow up in the office as planned.    Scribe Attestation  By signing my name below, I, iD Madsen MA, Scribe   attest that this documentation has been prepared under the direction and in the presence of Claudio Hood DO.

## 2023-07-11 ENCOUNTER — TELEMEDICINE (OUTPATIENT)
Dept: PHARMACY | Facility: HOSPITAL | Age: 63
End: 2023-07-11
Payer: MEDICARE

## 2023-07-11 DIAGNOSIS — Z79.899 MEDICATION MANAGEMENT: ICD-10-CM

## 2023-07-11 NOTE — PROGRESS NOTES
Pharmacist Clinic: [facility] Holliday was referred to the Clinical Pharmacy Team for financial assistance with Eliquis and Trelegy.     Patient has not yet obtained necessary financial documents, therefore cannot proceed with application. However, patient recently released from SNF and states she is doing much better. States Eliquis dose was decreased and still taking Trelegy.    Referring Provider: Claudio Hood DO  Problem List Items Addressed This Visit    None  Visit Diagnoses       Medication management              MEDICATION ASSESSMENT    Allergies: Ace inhibitors, Penicillins, and Sulfa (sulfonamide antibiotics)     [id number] [place], [place] - [address] AT [facility] [address]  [address] [facility] [place] [address]  Phone: [phone] Fax: [phone]    LAB  Lab Results   Component Value Date    BILITOT 0.3 05/28/2023    CALCIUM 9.0 06/02/2023    CO2 24 06/02/2023     (H) 06/02/2023    CREATININE 1.79 (H) 06/02/2023    GLUCOSE 51 (LL) 06/02/2023    ALKPHOS 119 05/28/2023    K 4.4 06/02/2023    PROT 7.3 05/28/2023     06/02/2023    AST 50 (H) 05/28/2023    ALT 29 05/28/2023    BUN 46 (H) 06/02/2023    ANIONGAP 13 06/02/2023    MG 1.76 06/02/2023    PHOS 4.6 06/02/2023    GGT 63 (H) 01/07/2021     04/30/2021    ALBUMIN 3.2 (L) 06/02/2023    LIPASE 30 01/24/2023     Lab Results   Component Value Date    TRIG 79 03/24/2023    CHOL 160 03/24/2023    HDL 70.9 03/24/2023     Lab Results   Component Value Date    HGBA1C 6.3 (A) 03/22/2023       DRUG INTERACTIONS  - No significant drug-drug interactions exist that require adjustment to therapy    CURRENT PHARMACOTHERAPY  - Eliquis 2.5 mg BID  - Trelegy 200-62.5-25 - 1 puff by mouth daily    PATIENT EDUCATION/DISCUSSION  - Spoke with patient regarding  PAP program. Patient is to provide financial documents to [facility] within the next week.    - Patient verbally reports monthly or yearly income which is less than 400% federal poverty  level  - Application for program has been submitted for the following medications: Eliquis 5 mg BID and Trelegy 200-62.5-25 daily  - Patient has been informed that program team will be reaching out to them to discuss necessary documentation, instructed to answer phone/return voicemail.   - Patient aware this process may take up to 4 weeks.   - If approved medication must be filled through [facility] pharmacy and may be picked up or mailed to patient.      - Answered all patient questions and concerns; provided [facility] phone number if issues/questions arise    RECOMMENDATIONS/PLAN  Patient is to provide financial documents to [facility]. Will follow-up in one week to ensure patient was able to find financial documentation and then pharmacy team will proceed.    2.  Continue: Continue all meds under the continuation of care with the referring provider and clinical pharmacy team.    Clinical Pharmacist follow up: 7/18/2023 @ 11:30 am  Type of Encounter: Virtual    Thank you,  Stephanie Byers, PharmD    Verbal consent to manage patient's drug therapy was obtained from patient. They were informed they may decline to participate or withdraw from participation in pharmacy services at any time.

## 2023-07-12 ENCOUNTER — PATIENT OUTREACH (OUTPATIENT)
Dept: PRIMARY CARE | Facility: CLINIC | Age: 63
End: 2023-07-12
Payer: MEDICARE

## 2023-07-12 DIAGNOSIS — R60.0 BILATERAL LEG EDEMA: ICD-10-CM

## 2023-07-12 DIAGNOSIS — I48.91 ATRIAL FIBRILLATION, UNSPECIFIED TYPE (MULTI): ICD-10-CM

## 2023-07-12 LAB
ALANINE AMINOTRANSFERASE (SGPT) (U/L) IN SER/PLAS: 17 U/L (ref 7–45)
ALBUMIN (G/DL) IN SER/PLAS: 3.5 G/DL (ref 3.4–5)
ALKALINE PHOSPHATASE (U/L) IN SER/PLAS: 156 U/L (ref 33–136)
ANION GAP IN SER/PLAS: 15 MMOL/L (ref 10–20)
ASPARTATE AMINOTRANSFERASE (SGOT) (U/L) IN SER/PLAS: 21 U/L (ref 9–39)
BASOPHILS (10*3/UL) IN BLOOD BY AUTOMATED COUNT: 0.01 X10E9/L (ref 0–0.1)
BASOPHILS/100 LEUKOCYTES IN BLOOD BY AUTOMATED COUNT: 0.3 % (ref 0–2)
BILIRUBIN TOTAL (MG/DL) IN SER/PLAS: 0.6 MG/DL (ref 0–1.2)
CALCIUM (MG/DL) IN SER/PLAS: 8.7 MG/DL (ref 8.6–10.3)
CARBON DIOXIDE, TOTAL (MMOL/L) IN SER/PLAS: 23 MMOL/L (ref 21–32)
CHLORIDE (MMOL/L) IN SER/PLAS: 109 MMOL/L (ref 98–107)
CREATININE (MG/DL) IN SER/PLAS: 1.55 MG/DL (ref 0.5–1.05)
EOSINOPHILS (10*3/UL) IN BLOOD BY AUTOMATED COUNT: 0.05 X10E9/L (ref 0–0.7)
EOSINOPHILS/100 LEUKOCYTES IN BLOOD BY AUTOMATED COUNT: 1.4 % (ref 0–6)
ERYTHROCYTE DISTRIBUTION WIDTH (RATIO) BY AUTOMATED COUNT: 17.5 % (ref 11.5–14.5)
ERYTHROCYTE MEAN CORPUSCULAR HEMOGLOBIN CONCENTRATION (G/DL) BY AUTOMATED: 29.8 G/DL (ref 32–36)
ERYTHROCYTE MEAN CORPUSCULAR VOLUME (FL) BY AUTOMATED COUNT: 99 FL (ref 80–100)
ERYTHROCYTES (10*6/UL) IN BLOOD BY AUTOMATED COUNT: 3.13 X10E12/L (ref 4–5.2)
ESTIMATED AVERAGE GLUCOSE FOR HBA1C: 151 MG/DL
GFR FEMALE: 38 ML/MIN/1.73M2
GLUCOSE (MG/DL) IN SER/PLAS: 69 MG/DL (ref 74–99)
HEMATOCRIT (%) IN BLOOD BY AUTOMATED COUNT: 30.9 % (ref 36–46)
HEMOGLOBIN (G/DL) IN BLOOD: 9.2 G/DL (ref 12–16)
HEMOGLOBIN A1C/HEMOGLOBIN TOTAL IN BLOOD: 6.9 %
IMMATURE GRANULOCYTES/100 LEUKOCYTES IN BLOOD BY AUTOMATED COUNT: 0.3 % (ref 0–0.9)
LEUKOCYTES (10*3/UL) IN BLOOD BY AUTOMATED COUNT: 3.7 X10E9/L (ref 4.4–11.3)
LYMPHOCYTES (10*3/UL) IN BLOOD BY AUTOMATED COUNT: 1.23 X10E9/L (ref 1.2–4.8)
LYMPHOCYTES/100 LEUKOCYTES IN BLOOD BY AUTOMATED COUNT: 33.6 % (ref 13–44)
MONOCYTES (10*3/UL) IN BLOOD BY AUTOMATED COUNT: 0.26 X10E9/L (ref 0.1–1)
MONOCYTES/100 LEUKOCYTES IN BLOOD BY AUTOMATED COUNT: 7.1 % (ref 2–10)
NEUTROPHILS (10*3/UL) IN BLOOD BY AUTOMATED COUNT: 2.1 X10E9/L (ref 1.2–7.7)
NEUTROPHILS/100 LEUKOCYTES IN BLOOD BY AUTOMATED COUNT: 57.3 % (ref 40–80)
NRBC (PER 100 WBCS) BY AUTOMATED COUNT: 0.8 /100 WBC (ref 0–0)
PLATELETS (10*3/UL) IN BLOOD AUTOMATED COUNT: 140 X10E9/L (ref 150–450)
POTASSIUM (MMOL/L) IN SER/PLAS: 3.6 MMOL/L (ref 3.5–5.3)
PROTEIN TOTAL: 6.7 G/DL (ref 6.4–8.2)
RBC MORPHOLOGY IN BLOOD: NORMAL
SODIUM (MMOL/L) IN SER/PLAS: 143 MMOL/L (ref 136–145)
UREA NITROGEN (MG/DL) IN SER/PLAS: 16 MG/DL (ref 6–23)

## 2023-07-12 NOTE — PROGRESS NOTES
Call placed to patient to follow up after her last office visit on 7/7 where she had medications adjusted and new ones added.     Patient was able to  Rx with no issues. She has been taking all medications as directed. Including prednisone 5 mg, which she states she has been on for over 30 years (Rx, Dr. Castellanos). She denies having to use any insulin coverage since her visit and her blood sugars remain in normal range. Sliding scale is new since visit to [facility].     Per patient her legs are improving and look so much better. She states there is still some swelling but not like before. As for the shortness of breath she was experiencing that has also improved some but not drastically. Patient states understanding that it may take time for shortness of breath to improve. However she is aware to give a call if she feels it worsens. Denies feelings of palpitations, chest pain or heart racing.    I encouraged patient to wear MARY hose as she said she has not really been doing so. She says she has a hard time putting them on herself. We talked about activity and she says alternatively she has been trying to move around a bit more. Discussed the importance of still hydrating even while on a diuretic. She states understanding.    We did discuss the freestyle andrea. She states at this time she is not interested. Patient notified if she changes her mind to let me or someone in the office know.     Denies any further needs.

## 2023-07-13 DIAGNOSIS — R60.0 BILATERAL LEG EDEMA: ICD-10-CM

## 2023-07-13 DIAGNOSIS — Z01.89 ROUTINE LAB DRAW: ICD-10-CM

## 2023-07-13 NOTE — TELEPHONE ENCOUNTER
----- Message from Claudio Hood DO sent at 7/13/2023  7:50 AM EDT -----  Her labs are looking stable, sugars are good kidneys are improving but she still needs to take the potassium, I think it is okay to continue to take the water pill if it is helping her legs,, recheck a BMP and CBC in 2 weeks

## 2023-07-18 ENCOUNTER — TELEMEDICINE (OUTPATIENT)
Dept: PHARMACY | Facility: HOSPITAL | Age: 63
End: 2023-07-18
Payer: MEDICARE

## 2023-07-18 DIAGNOSIS — Z79.899 MEDICATION MANAGEMENT: ICD-10-CM

## 2023-07-18 DIAGNOSIS — I48.91 ATRIAL FIBRILLATION, UNSPECIFIED TYPE (MULTI): ICD-10-CM

## 2023-07-18 RX ORDER — FLUTICASONE FUROATE, UMECLIDINIUM BROMIDE AND VILANTEROL TRIFENATATE 200; 62.5; 25 UG/1; UG/1; UG/1
1 POWDER RESPIRATORY (INHALATION) DAILY
Qty: 60 EACH | Refills: 2 | Status: SHIPPED | OUTPATIENT
Start: 2023-07-18 | End: 2023-07-31 | Stop reason: SDUPTHER

## 2023-07-18 NOTE — PROGRESS NOTES
Pharmacist Clinic: [facility] Holliday was referred to the Clinical Pharmacy Team for financial assistance with Eliquis and Trelegy.     Referring Provider: Claudio Hood DO  Problem List Items Addressed This Visit    None  Visit Diagnoses       Medication management              HPI  Spoke with patient today regarding Eliquis/Trelegy financial assistance. Patient has sent all necessary paperwork to [facility], of which will be submitted to  PAP program. Patient states she was recently started on Abilify 2 mg daily, and this is also expensive for her (~$90/month) through insurance. Provided patient with GoodRx coupon - while this will not be through insurance/attributed to deductible, this will significantly lower patient's monthly copay. Unfortunately, generic medications are not eligible for  PAP at this time.    Patient denies any concerns since she was discharged from SNF a few weeks ago.    MEDICATION ASSESSMENT    Allergies: Ace inhibitors, Penicillins, and Sulfa (sulfonamide antibiotics)     [id number] [place], [place] - [address] AT [facility] [facility] [address]  [address] [facility] [address]  Phone: [phone] Fax: [phone]    LAB  Lab Results   Component Value Date    BILITOT 0.6 07/12/2023    CALCIUM 8.7 07/12/2023    CO2 23 07/12/2023     (H) 07/12/2023    CREATININE 1.55 (H) 07/12/2023    GLUCOSE 69 (L) 07/12/2023    ALKPHOS 156 (H) 07/12/2023    K 3.6 07/12/2023    PROT 6.7 07/12/2023     07/12/2023    AST 21 07/12/2023    ALT 17 07/12/2023    BUN 16 07/12/2023    ANIONGAP 15 07/12/2023    MG 1.76 06/02/2023    PHOS 4.6 06/02/2023    GGT 63 (H) 01/07/2021     04/30/2021    ALBUMIN 3.5 07/12/2023    LIPASE 30 01/24/2023     Lab Results   Component Value Date    TRIG 79 03/24/2023    CHOL 160 03/24/2023    HDL 70.9 03/24/2023     Lab Results   Component Value Date    HGBA1C 6.9 (A) 07/12/2023       DRUG INTERACTIONS  - No significant drug-drug interactions exist that  require adjustment to therapy    CURRENT PHARMACOTHERAPY  - Eliquis 2.5 mg BID  - Trelegy 200-62.5-25 - 1 puff by mouth daily    PATIENT EDUCATION/DISCUSSION  - Spoke with patient regarding  PAP program.   - Patient verbally reports monthly or yearly income which is less than 400% federal poverty level  - Application for program has been submitted for the following medications: Eliquis 5 mg BID and Trelegy 200-62.5-25 daily  - Patient has been informed that program team will be reaching out to them to discuss necessary documentation, instructed to answer phone/return voicemail.   - Patient aware this process may take up to 4 weeks.   - If approved medication must be filled through [facility] pharmacy and may be picked up or mailed to patient.      - Counseled patient on MOA, expectations, side effects, duration of therapy, contraindications, administration, and monitoring parameters  - Answered all patient questions and concerns; provided [facility] phone number if issues/questions arise    RECOMMENDATIONS/PLAN  Continue Eliquis 2.5 mg BID and Trelegy 200-62.5-25 mg/actuation - 1 puff daily. Prescriptions sent to [facility] for  PAP.      Continue all meds under the continuation of care with the referring provider and clinical pharmacy team.    Clinical Pharmacist follow up: 8/1/2023 @ 11:30 am  Type of Encounter: Virtual    Thank you,  Stephanie Byers, Marina    Verbal consent to manage patient's drug therapy was obtained from patient. They were informed they may decline to participate or withdraw from participation in pharmacy services at any time.

## 2023-07-19 ENCOUNTER — HOSPITAL ENCOUNTER (OUTPATIENT)
Dept: DATA CONVERSION | Facility: HOSPITAL | Age: 63
End: 2023-07-19
Attending: OPHTHALMOLOGY

## 2023-07-19 DIAGNOSIS — H25.811 COMBINED FORMS OF AGE-RELATED CATARACT, RIGHT EYE: ICD-10-CM

## 2023-07-28 ENCOUNTER — PATIENT OUTREACH (OUTPATIENT)
Dept: PRIMARY CARE | Facility: CLINIC | Age: 63
End: 2023-07-28
Payer: MEDICARE

## 2023-07-28 DIAGNOSIS — Z09 HOSPITAL DISCHARGE FOLLOW-UP: ICD-10-CM

## 2023-07-28 DIAGNOSIS — I48.91 ATRIAL FIBRILLATION, UNSPECIFIED TYPE (MULTI): ICD-10-CM

## 2023-07-28 DIAGNOSIS — Z79.899 MEDICATION MANAGEMENT: ICD-10-CM

## 2023-07-28 DIAGNOSIS — E11.65 UNCONTROLLED DIABETES MELLITUS WITH HYPERGLYCEMIA, WITHOUT LONG-TERM CURRENT USE OF INSULIN (MULTI): ICD-10-CM

## 2023-07-28 RX ORDER — PREDNISOLONE ACETATE 10 MG/ML
1 SUSPENSION/ DROPS OPHTHALMIC 4 TIMES DAILY
COMMUNITY
Start: 2023-07-19 | End: 2023-08-01 | Stop reason: ALTCHOICE

## 2023-07-28 RX ORDER — LOPERAMIDE HYDROCHLORIDE 2 MG/1
2 CAPSULE ORAL 3 TIMES DAILY
Status: ON HOLD | COMMUNITY
Start: 2023-07-27 | End: 2023-10-20 | Stop reason: ENTERED-IN-ERROR

## 2023-07-28 RX ORDER — FLUTICASONE FUROATE, UMECLIDINIUM BROMIDE AND VILANTEROL TRIFENATATE 200; 62.5; 25 UG/1; UG/1; UG/1
1 POWDER RESPIRATORY (INHALATION) DAILY
Qty: 60 EACH | Refills: 2 | Status: CANCELLED | OUTPATIENT
Start: 2023-07-28

## 2023-07-28 RX ORDER — FLUDROCORTISONE ACETATE 0.1 MG/1
0.1 TABLET ORAL EVERY OTHER DAY
COMMUNITY
Start: 2023-07-03 | End: 2023-08-01 | Stop reason: SDUPTHER

## 2023-07-28 RX ORDER — FLASH GLUCOSE SENSOR
KIT MISCELLANEOUS
Qty: 1 EACH | Refills: 0 | Status: SHIPPED | OUTPATIENT
Start: 2023-07-28 | End: 2023-09-06

## 2023-07-28 RX ORDER — DRONABINOL 2.5 MG/1
2.5 CAPSULE ORAL
Status: ON HOLD | COMMUNITY
End: 2023-10-20 | Stop reason: ENTERED-IN-ERROR

## 2023-07-28 NOTE — PROGRESS NOTES
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 She was not a good historian. She reported having profuse watery diarrhea for a long time. She was unable to determine if it contained any blood or mucus. Her last meal was 7/22 but cannot recall what it was.  She has been too weak to keep up with her p.o. intake/nutrition specially with having no appetite for a long time. No history of abdominal pain, urinary habit change, N/V. Initial vitals afebrile temp 36.1, HR 73, RR 20, SPO2 97 on room air, Bp 137/88. Labs: CBC showing slightly leukopenic with WBCs 4.3, H/H 8.9/30 (close to her baseline) her CMP showed glucose 74, chloride 108, creatinine 1.3,GFR 45 and calcium 8.2. UA: +blaine nitrate with proteinuria, glycosuria and ketones. Imaging: CXR showed cardiomegaly without focal airspace consolidation.  Upon transit to ED, HER BG was 50, she was given D50. Upon arrival at ED, she was given 2 g Rocephin, and received another D5% LR 1L. Over the next few days, her mental status started to improve with continuing the IV antibiotic to treat her UTI and with her PCR for C diff negative result. she was treated with loperamide as needed. Patient was monitored with serial labs and  electrolytes were replaced as needed, p.o. intake encouraged as tolerated and she was prescribed a appetite stimulus Marinol. Regarding her Secondary adrenal insuffiencey we started her on fludrocortisone and 5 mg prednisone daily. Patient continued to improve clinically and she was deemed medically stable for discharge patient was discharged in stable condition with plan to follow-up appointment with her rheumatologist and PCP clinic for further care to discussed  her management and medication compliance.     New medications:  - Risperidone 0.5 mg daily at bedtime  - Dronabinol 2.5 mg 2x daily   - Loperamide 3x daily with meals  Continue taking both prednisone and fludrocortisone     Discontinued medications:  - Metoprolol  - Aripiprazole  See Discharge assessment below for further details.    Engagement  Call Start Time: 1043 (7/28/2023 10:42 AM)    Medications  Medications reviewed with patient/caregiver?: Yes (7/28/2023 10:42 AM)  Is the patient having any side effects they believe may be caused by any medication additions or changes?: No (7/28/2023 10:42 AM)  Does the patient have all medications ordered at discharge?: No (waiting on Dronabinol) (7/28/2023 10:42 AM)  What is keeping the patient from filling the prescriptions?: Pre-authorization in progress (Dronabinol) (7/28/2023 10:42 AM)  Care Management Interventions: No intervention needed (7/28/2023 10:42 AM)  Is the patient taking all medications as directed (includes completed medication regime)?: Yes (7/28/2023 10:42 AM)  Care Management Interventions: Provided patient education (7/28/2023 10:42 AM)  Medication Comments: Patient is down to one puff left in TRELEGY - refill requested (7/28/2023 10:42 AM)    Appointments  Does the patient have a primary care provider?: Yes (7/28/2023 10:42 AM)  Care Management Interventions: Verified appointment date/time/provider; Educated patient on importance of making appointment (F/U with rhemotology; plan to keep  appointment but may have a transportation issue conflict. Patient to notify me if she needs assistance. Also aware to call office and reschedule if there is conflict that cant be solved.) (7/28/2023 10:42 AM)  Care Management Interventions: Advised patient to keep appointment; Advised to reschedule appointment; Educated on importance of keeping appointment (7/28/2023 10:42 AM)    Self Management  What is the home health agency?: Patient is not sure. Says she has used them before - likely [facility]. Said they have called her and she needs to call back. (7/28/2023 10:42 AM)  Has home health visited the patient within 72 hours of discharge?: Call prior to 72 hours (7/28/2023 10:42 AM)  What Durable Medical Equipment (DME) was ordered?: N/A (7/28/2023 10:42 AM)    Patient Teaching  Does the patient have access to their discharge instructions?: Yes (7/28/2023 10:42 AM)  Care Management Interventions: Reviewed instructions with patient; Educated on MyChart; Referred to Health Information Management (HIM) (7/28/2023 10:42 AM)  What is the patient's perception of their health status since discharge?: Improving (7/28/2023 10:42 AM)  Is the patient/caregiver able to teach back the hierarchy of who to call/visit for symptoms/problems? PCP, Specialist, Home Health nurse, Urgent Care, ED, 911: Yes (7/28/2023 10:42 AM)    Wrap Up  Call End Time: 1102 (7/28/2023 10:42 AM)

## 2023-07-31 ENCOUNTER — APPOINTMENT (OUTPATIENT)
Dept: PRIMARY CARE | Facility: CLINIC | Age: 63
End: 2023-07-31
Payer: MEDICARE

## 2023-07-31 DIAGNOSIS — I48.91 ATRIAL FIBRILLATION, UNSPECIFIED TYPE (MULTI): ICD-10-CM

## 2023-07-31 DIAGNOSIS — Z79.899 MEDICATION MANAGEMENT: ICD-10-CM

## 2023-07-31 RX ORDER — BLOOD-GLUCOSE SENSOR
EACH MISCELLANEOUS
Qty: 4 EACH | Refills: 2 | Status: ON HOLD | OUTPATIENT
Start: 2023-07-31

## 2023-07-31 RX ORDER — BLOOD-GLUCOSE TRANSMITTER
EACH MISCELLANEOUS
Qty: 1 EACH | Refills: 0 | Status: ON HOLD | OUTPATIENT
Start: 2023-07-31

## 2023-07-31 RX ORDER — FLASH GLUCOSE SENSOR
KIT MISCELLANEOUS
Refills: 0 | OUTPATIENT
Start: 2023-07-31

## 2023-07-31 RX ORDER — BLOOD-GLUCOSE,RECEIVER,CONT
EACH MISCELLANEOUS
Qty: 1 EACH | Refills: 0 | Status: ON HOLD | OUTPATIENT
Start: 2023-07-31

## 2023-07-31 RX ORDER — FLUTICASONE FUROATE, UMECLIDINIUM BROMIDE AND VILANTEROL TRIFENATATE 200; 62.5; 25 UG/1; UG/1; UG/1
1 POWDER RESPIRATORY (INHALATION) DAILY
Qty: 60 EACH | Refills: 2 | Status: SHIPPED | OUTPATIENT
Start: 2023-07-31 | End: 2023-08-01 | Stop reason: SDUPTHER

## 2023-08-01 ENCOUNTER — TELEMEDICINE (OUTPATIENT)
Dept: PHARMACY | Facility: HOSPITAL | Age: 63
End: 2023-08-01
Payer: MEDICARE

## 2023-08-01 ENCOUNTER — OFFICE VISIT (OUTPATIENT)
Dept: PRIMARY CARE | Facility: CLINIC | Age: 63
End: 2023-08-01
Payer: MEDICARE

## 2023-08-01 VITALS
HEIGHT: 69 IN | BODY MASS INDEX: 31.1 KG/M2 | OXYGEN SATURATION: 97 % | RESPIRATION RATE: 16 BRPM | DIASTOLIC BLOOD PRESSURE: 60 MMHG | TEMPERATURE: 96.8 F | WEIGHT: 210 LBS | SYSTOLIC BLOOD PRESSURE: 104 MMHG | HEART RATE: 76 BPM

## 2023-08-01 DIAGNOSIS — Z79.899 MEDICATION MANAGEMENT: ICD-10-CM

## 2023-08-01 DIAGNOSIS — M32.9 LUPUS (MULTI): ICD-10-CM

## 2023-08-01 DIAGNOSIS — E11.3591: ICD-10-CM

## 2023-08-01 DIAGNOSIS — E16.2 HYPOGLYCEMIA: Primary | ICD-10-CM

## 2023-08-01 DIAGNOSIS — H91.90 HEARING LOSS, UNSPECIFIED HEARING LOSS TYPE, UNSPECIFIED LATERALITY: ICD-10-CM

## 2023-08-01 DIAGNOSIS — I48.91 ATRIAL FIBRILLATION, UNSPECIFIED TYPE (MULTI): ICD-10-CM

## 2023-08-01 DIAGNOSIS — R41.0 DELIRIUM: ICD-10-CM

## 2023-08-01 DIAGNOSIS — E27.40: ICD-10-CM

## 2023-08-01 DIAGNOSIS — E27.40 ADRENAL INSUFFICIENCY (MULTI): ICD-10-CM

## 2023-08-01 DIAGNOSIS — Z09 HOSPITAL DISCHARGE FOLLOW-UP: ICD-10-CM

## 2023-08-01 PROCEDURE — 3066F NEPHROPATHY DOC TX: CPT | Performed by: FAMILY MEDICINE

## 2023-08-01 PROCEDURE — 3044F HG A1C LEVEL LT 7.0%: CPT | Performed by: FAMILY MEDICINE

## 2023-08-01 PROCEDURE — 1036F TOBACCO NON-USER: CPT | Performed by: FAMILY MEDICINE

## 2023-08-01 PROCEDURE — 99496 TRANSJ CARE MGMT HIGH F2F 7D: CPT | Performed by: FAMILY MEDICINE

## 2023-08-01 PROCEDURE — 3078F DIAST BP <80 MM HG: CPT | Performed by: FAMILY MEDICINE

## 2023-08-01 PROCEDURE — 3074F SYST BP LT 130 MM HG: CPT | Performed by: FAMILY MEDICINE

## 2023-08-01 RX ORDER — FLUDROCORTISONE ACETATE 0.1 MG/1
0.1 TABLET ORAL EVERY OTHER DAY
Qty: 45 TABLET | Refills: 1 | Status: SHIPPED | OUTPATIENT
Start: 2023-08-01 | End: 2023-11-01

## 2023-08-01 RX ORDER — METOPROLOL TARTRATE 25 MG/1
25 TABLET, FILM COATED ORAL 2 TIMES DAILY
Qty: 60 TABLET | Refills: 5 | Status: SHIPPED | OUTPATIENT
Start: 2023-08-01 | End: 2023-08-01 | Stop reason: ALTCHOICE

## 2023-08-01 RX ORDER — FLUTICASONE FUROATE, UMECLIDINIUM BROMIDE AND VILANTEROL TRIFENATATE 200; 62.5; 25 UG/1; UG/1; UG/1
1 POWDER RESPIRATORY (INHALATION) DAILY
Qty: 60 EACH | Refills: 2 | Status: SHIPPED | OUTPATIENT
Start: 2023-08-01 | End: 2023-08-01

## 2023-08-01 NOTE — PROGRESS NOTES
Pharmacist Clinic: [facility] and Hospital Discharge    Bing Holliday was referred to the Clinical Pharmacy Team for financial assistance with Eliquis and Trelegy.     Referring Provider: Claudio Hood DO  Problem List Items Addressed This Visit    None  Visit Diagnoses       Medication management        Atrial fibrillation, unspecified type (CMS/[facility])              HPI  Spoke with patient regarding recent hospital admission and discharge. Patient states the reason for hospital visit was for hypoglycemia, delirium due to multiple etiologies, and adrenal insufficiency. Patient states Eliquis was increased to 5 mg BID, and Abilify and metoprolol were discontinued at discharge. Patient states that Abilify was switched to Risperdal and metoprolol slowed her heart rate. Patient also started fludrocortisone and methylprednisolone (one more day of therapy pack left).     Patient states that she doesn't check her blood pressure as she does not have a BP cuff, however, patient uses her watch to check pulse, heart rate and blood pressure. Patient unable to provide BP readings, but denies symptoms of hypertension.     Patient states Freestyle Bradley is awaiting prior authorization due to additional evidence needed of hypoglycemia unawareness. Patient checks BG with glucometer and stated that her sugars has been <70 mg/dL, especially in the mornings. Patient is asymptomatic when BG is low and drinks juice when she see these readings. Also states she has candy readily available.     PAP was approved for Eliquis and Trelegy, and must be filled through [facility] pharmacy. Patient has not received Trelegy from pharmacy. [facility] will correct this issue and have mailed out to patient by end of the week.    MEDICATION ASSESSMENT    Allergies: Ace inhibitors, Penicillins, and Sulfa (sulfonamide antibiotics)     [id number] [place], [place] - [address] [facility] [address]  [address] [facility] [address]  Phone: [phone] Fax:  [phone]    [facility] - [place], [place] - [address]  [address]  [address]  [facility] [address]  Phone: [phone] Fax: [phone]    LAB  Lab Results   Component Value Date    BILITOT 0.2 07/25/2023    CALCIUM 8.1 (L) 07/27/2023    CO2 22 07/27/2023     (H) 07/27/2023    CREATININE 1.69 (H) 07/27/2023    GLUCOSE 96 07/27/2023    ALKPHOS 116 07/25/2023    K 4.0 07/27/2023    PROT 5.2 (L) 07/25/2023     07/27/2023    AST 25 07/25/2023    ALT 8 07/25/2023    BUN 14 07/27/2023    ANIONGAP 14 07/27/2023    MG 2.18 07/27/2023    PHOS 4.7 07/27/2023    GGT 63 (H) 01/07/2021     04/30/2021    ALBUMIN 2.9 (L) 07/27/2023    LIPASE 30 01/24/2023     Lab Results   Component Value Date    TRIG 79 03/24/2023    CHOL 160 03/24/2023    HDL 70.9 03/24/2023     Lab Results   Component Value Date    HGBA1C 6.9 (A) 07/12/2023       DRUG INTERACTIONS  - No significant drug-drug interactions exist that require adjustment to therapy    CURRENT PHARMACOTHERAPY  - Eliquis 5 mg BID  - Trelegy 200-62.5-25 - 1 puff by mouth daily    PATIENT EDUCATION/DISCUSSION  - Reviewed recent medication changes. Patient denies any concerns regarding her medications. Patient was able to receive dronabinol from pharmacy (no copay). Advised to use in times of severe nausea/vomiting  - Patient will benefit from a CGM based on hypoglycemia unawareness;   - Reviewed symptoms of hyper/hypoglycemia and hyper/hypotension    - Answered all patient questions and concerns; provided [facility] phone number if issues/questions arise    RECOMMENDATIONS/PLAN  Increase Eliquis 5 mg BID and continue Trelegy 200-62.5-25 mg/actuation - 1 puff daily. Prescription for Trelegy sent to [facility] for  PAP, and will be mailed out to patient by end of week  [facility] to investigate CGM eligibility/prior authorization    Continue all meds under the continuation of care with the referring provider and clinical pharmacy team.    Clinical  Pharmacist follow up: 8/8/2023 @ 11:30 am  Type of Encounter: Virtual    Thank you,  Stephanie Byers PharmD    Verbal consent to manage patient's drug therapy was obtained from patient. They were informed they may decline to participate or withdraw from participation in pharmacy services at any time.

## 2023-08-01 NOTE — PROGRESS NOTES
Subjective   Patient ID: Bing Holliday is a 62 y.o. female who presents for hospital follow up.    [facility]    Hospital follow up     KRISTAL complete.   Admitted to [facility].   Admission dates 7/23/23 to 7/27/23.   Admitted for Acute, mixed level of activity, delirium due to multiple etiologies, Adrenal insufficiency .  Days since discharge 3.   Patient had EKG, XR Chest, BW, UC, UA    Patient advised to follow up with  Rheumatologist: 8/9/2023 @ 3:40 PM  and PCP.   Pt is feeling much better since discharged from the hospital.  Pt hearing is getting worse since she was in the hospital this time around.    Trelegy has helped her breathing.    She was prescribed Marinol by the hospital.  She does not feel this is helpful.    She has been having difficulty with hearing loss.  Denies fullness.     Patient was prescribed - Risperidone 0.5 mg daily at bedtime , Dronabinol 2.5 mg 2x daily   - Loperamide 3x daily with meals        Review of systems  ; Patient seen today for exam denies any problems with headaches or vision, denies any shortness of breath chest pain nausea or vomiting, no black stool no blood in the stool no heartburn type symptoms denies any problems with constipation or diarrhea, and no dysuria-type symptoms    The patient's allergies medications were reviewed with them today    The patient's social family and surgical history or also reviewed here today, along with her past medical history.     Objective     Alert and active in  no acute distress  HEENT TMs clear oropharynx negative nares clear no drainage noted neck supple  With no adenopathy   Heart regular rate and rhythm without murmur and no carotid bruits  Lungs- clear to auscultation bilaterally, no wheeze or rhonchi noted  Thyroid -negative masses or nodularity  Abdomen- soft times four quadrants , bowel sounds positive no masses or organomegaly, negative tenderness guarding or rebound  Neurological exam unremarkable- DTRs in upper and lower extremities  "within normal limits.   skin -no lesions noted      /60 (BP Location: Right arm, Patient Position: Sitting, BP Cuff Size: Adult)   Pulse 76   Temp 36 °C (96.8 °F) (Temporal)   Resp 16   Ht 1.753 m (5' 9\")   Wt 95.3 kg (210 lb)   SpO2 97%   BMI 31.01 kg/m²     Allergies   Allergen Reactions    Ace Inhibitors Other    Hydroxychloroquine Other     RETINAL BLEEDING    RETINAL BLEEDING, Eye problems    Lisinopril Other    Penicillins Unknown     tolerates cephalosporins    From Childhood    Mbr sts that she was told since young that she is allergic    Sulfa (Sulfonamide Antibiotics) Hives       Assessment/Plan   Problem List Items Addressed This Visit       Lupus (CMS/[facility])    Hypoglycemia - Primary    Proliferative diabetic retinopathy of right eye without macular edema determined by examination associated with type 2 diabetes mellitus (CMS/[facility])     Other Visit Diagnoses       Hospital discharge follow-up        Delirium        Adrenal insufficiency (CMS/[facility])        Relevant Medications    fludrocortisone (Florinef) 0.1 mg tablet    Hearing loss, unspecified hearing loss type, unspecified laterality              Reviewed EKG, XR Chest, BW, UC, UA.    She has had 2 hospitalizations with hypoglycemia.  She is still getting low sugars at home.  Has had 60s and 50s.  She does not notice or have any warning of her low sugars.  This is another reason we need continuous glucose monitoring.    She is currently off sulfonylureas was taking lispro insulin in the off of that because of low sugars with her adrenal sufficiency it is making things difficult will need some medicine as her A1c is 6.9 but this time we need to monitor things because of these multiple issues going on between sugars and adrenal insufficiency    She should take deep breaths while at home and move ankles to help prevent pneumonia and blood clots.    Hold Metoprolol.    She can go to a local store for a hearing check and possible hearing " aides.    If anything worsens or changes please call us at once, follow up in the office as planned.    Scribe Attestation  By signing my name below, I, Di Madsen MA, Scribe   attest that this documentation has been prepared under the direction and in the presence of Claudio Hood DO.

## 2023-08-02 ENCOUNTER — DOCUMENTATION (OUTPATIENT)
Dept: PRIMARY CARE | Facility: CLINIC | Age: 63
End: 2023-08-02
Payer: MEDICARE

## 2023-08-02 DIAGNOSIS — E11.65 UNCONTROLLED DIABETES MELLITUS WITH HYPERGLYCEMIA, WITHOUT LONG-TERM CURRENT USE OF INSULIN (MULTI): ICD-10-CM

## 2023-08-02 DIAGNOSIS — E27.40 ADRENAL INSUFFICIENCY (MULTI): ICD-10-CM

## 2023-08-02 NOTE — PROGRESS NOTES
Call placed to [facility] to follow up on CGM. CGM is not covered under her insurance. Notified  clinical pharmacist, Stephanie, who will be looking into this matter further.

## 2023-08-08 ENCOUNTER — TELEMEDICINE (OUTPATIENT)
Dept: PHARMACY | Facility: HOSPITAL | Age: 63
End: 2023-08-08
Payer: MEDICARE

## 2023-08-08 DIAGNOSIS — E16.2 HYPOGLYCEMIA: Primary | ICD-10-CM

## 2023-08-08 DIAGNOSIS — Z79.899 MEDICATION MANAGEMENT: ICD-10-CM

## 2023-08-08 NOTE — PROGRESS NOTES
Pharmacist Clinic: [facility] and Hospital Discharge    Bing Holliday was referred to the Clinical Pharmacy Team for financial assistance with Eliquis and Trelegy.     Referring Provider: Claudio Hood DO  Problem List Items Addressed This Visit    None  Visit Diagnoses       Medication management              HPI    Spoke with patient today - patient continues to check BG with glucometer about 4 times a day and sugars have been normal. However, this morning patient states that her BG was low. Patient also states that she has an appointment with her cardiologist today, and rheumatologist tomorrow. Denies any concerns at this time.    MEDICATION ASSESSMENT    Allergies: Ace inhibitors, Hydroxychloroquine, Lisinopril, Penicillins, and Sulfa (sulfonamide antibiotics)     [id number] [place], [place] - [address]  [address] [facility]  [facility] [address]  Phone: [phone] Fax: [phone]    [facility] - [place], [place] - [address]  [address]  [address]  [facility] [address]  Phone: [phone] Fax: [phone]    LAB  Lab Results   Component Value Date    BILITOT 0.2 07/25/2023    CALCIUM 8.1 (L) 07/27/2023    CO2 22 07/27/2023     (H) 07/27/2023    CREATININE 1.69 (H) 07/27/2023    GLUCOSE 96 07/27/2023    ALKPHOS 116 07/25/2023    K 4.0 07/27/2023    PROT 5.2 (L) 07/25/2023     07/27/2023    AST 25 07/25/2023    ALT 8 07/25/2023    BUN 14 07/27/2023    ANIONGAP 14 07/27/2023    MG 2.18 07/27/2023    PHOS 4.7 07/27/2023    GGT 63 (H) 01/07/2021     04/30/2021    ALBUMIN 2.9 (L) 07/27/2023    LIPASE 30 01/24/2023     Lab Results   Component Value Date    TRIG 79 03/24/2023    CHOL 160 03/24/2023    HDL 70.9 03/24/2023     Lab Results   Component Value Date    HGBA1C 6.9 (A) 07/12/2023       DRUG INTERACTIONS  - No significant drug-drug interactions exist that require adjustment to therapy    CURRENT PHARMACOTHERAPY  - Eliquis 5 mg BID  -  Trelegy 200-62.5-25 - 1 puff by mouth daily    PATIENT EDUCATION/DISCUSSION  - Informed patient that her [employer] paperwork to receive a dexcom is pending    - Informed she will have automated refills at [facility] pharmacy for her Eliquis and Trelegy     - Answered all patient questions and concerns; provided [facility] phone number if issues/questions arise    RECOMMENDATIONS/PLAN  Continue: Eliquis 5 mg BID and continue Trelegy 200-62.5-25 mg/actuation - 1 puff daily. Prescription for Eliquis sent to [facility] for [facility] to submit CGM paperwork    Continue all meds under the continuation of care with the referring provider and clinical pharmacy team.    Clinical Pharmacist follow up: 8/15/2023 @ 11:00 am  Type of Encounter: Virtual    Thank you,  Stephanie Byers, PharmD    Verbal consent to manage patient's drug therapy was obtained from patient. They were informed they may decline to participate or withdraw from participation in pharmacy services at any time.

## 2023-08-10 ENCOUNTER — TELEPHONE (OUTPATIENT)
Dept: PRIMARY CARE | Facility: CLINIC | Age: 63
End: 2023-08-10

## 2023-08-10 LAB
ANTI-DNA (DS): 1 IU/ML
C REACTIVE PROTEIN (MG/L) IN SER/PLAS: 0.6 MG/DL
COMPLEMENT C3 (MG/DL) IN SER/PLAS: 161 MG/DL (ref 87–200)
COMPLEMENT C4 (MG/DL) IN SER/PLAS: 55 MG/DL (ref 10–50)
SEDIMENTATION RATE, ERYTHROCYTE: 78 MM/H (ref 0–30)
URATE (MG/DL) IN SER/PLAS: 10.7 MG/DL (ref 2.3–6.7)

## 2023-08-10 NOTE — TELEPHONE ENCOUNTER
"\"NOTICE OF DENIAL OF MEDICAL COVERAGE -   DENIAL OF MEDICAL SERVICES/ITEMS LISTED BELOW REQUESTED BY YOU OR YOUR DOCTOR.    CODE - HOME HEALTH AIDE - 4 VISITS REQUESTED 4 VISITS DENIED   SERVICES WERE DENIED BASED ON THE RECORDS WE RECEIVED AND REVIEWED,PATIENT HAS BEEN REFERRED TO HOME HEALTH WITH A HISTORY OF CONFUSION,DEHYDRATION AND MUSCLE WEAKNESS. PATIENTS RECORDS SHOW THAT YOU HAVE CAREGIVERS WHO HELP WITH DAILY LIVING ACTIVITIES WHICH INCLUDE BATHING DRESSING TOILET ING GROOMING CLEANING AND MEAL PREP. THIS INFORMATION DOES NOT SUPPORT THE NEED FOR A HOME HEALTH AIDE TO MAKE VISITS.  IF YOU HAVE A CHANGE IN YOUR CONDITION YOUR DOCTOR CAN SUBMIT A REQUEST THAT INCLUDES INFORMATION SUPPORTING THE MEDICAL NECESSITY FOR HOME HEALTH AIDES. THIS DECISION IS BASED ON MEDICARE BENEFIT POLICY MANUAL \"  "

## 2023-08-14 ENCOUNTER — PATIENT OUTREACH (OUTPATIENT)
Dept: PRIMARY CARE | Facility: CLINIC | Age: 63
End: 2023-08-14
Payer: MEDICARE

## 2023-08-14 DIAGNOSIS — E16.2 HYPOGLYCEMIA: ICD-10-CM

## 2023-08-14 DIAGNOSIS — E11.65 UNCONTROLLED DIABETES MELLITUS WITH HYPERGLYCEMIA, WITHOUT LONG-TERM CURRENT USE OF INSULIN (MULTI): ICD-10-CM

## 2023-08-14 DIAGNOSIS — M32.9 LUPUS (MULTI): ICD-10-CM

## 2023-08-14 DIAGNOSIS — E27.40 ADRENAL INSUFFICIENCY (MULTI): ICD-10-CM

## 2023-08-14 PROCEDURE — 99490 CHRNC CARE MGMT STAFF 1ST 20: CPT | Performed by: FAMILY MEDICINE

## 2023-08-14 NOTE — PROGRESS NOTES
"Chart review complete. Reviewed notes from Cardiology and Rheumatology appointments. Matched medication recs from appointments with the one in [facility].     Monthly outreach complete.    Patient denies any main concerns at this time. However, is still waiting on CGM which would be our main priority in getting at this time. Bing states she's been doing pretty well lately. She continues to check her blood sugars daily, multiple times, and reports the levels fluctuate back and forth, states they've been \"more normal\" though. She does state it remains difficult due to her being asymptomatic when they fluctuate so much.     When asked about her shortness of breath she states its been better as of lately and attributes it to the Trelegy Inhaler. Denies any chest discomfort or pain. States she's been checking her BP daily and takes it while were on the phone - 111/69. States that is about normal for her.     Denies any assistance is needed at this time. Seems to be managing care well. Reminded to schedule her follow ups for Cardiology and Rheumatology, states that she already has. Did discuss changes in medications from Rheumatology appointment. Our med rec does not have the Rx for Colchicine PRN, she denies knowing the dosage and states she does not take it anyway. We will continue to keep her updated in regards to our continuing efforts to get her a CGM.   "

## 2023-08-18 ENCOUNTER — TELEMEDICINE (OUTPATIENT)
Dept: PHARMACY | Facility: HOSPITAL | Age: 63
End: 2023-08-18
Payer: MEDICARE

## 2023-08-18 DIAGNOSIS — Z79.899 MEDICATION MANAGEMENT: ICD-10-CM

## 2023-08-18 NOTE — PROGRESS NOTES
Pharmacist Clinic: [facility] and Hospital Discharge    iBng Holliday was referred to the Clinical Pharmacy Team for financial assistance with Eliquis and Trelegy.     Referring Provider: Claudio Hood DO  Problem List Items Addressed This Visit    None  Visit Diagnoses       Medication management              HPI    Spoke with patient today - patient continues to check BG with glucometer about 4 times a day and sugars have been normal. Denies hypoglycemia/low blood sugars since last [facility] visit. Denies any concerns at this time.    MEDICATION ASSESSMENT    Allergies: Ace inhibitors, Hydroxychloroquine, Lisinopril, Penicillins, and Sulfa (sulfonamide antibiotics)     [id number] [place], [place] - [address]  [address]  [place] [address]  Phone: [phone] Fax: [phone]    [facility] - [place], [place] - [address]  [address]  [address]  [facility] [address]  Phone: [phone] Fax: [phone]    LAB  Lab Results   Component Value Date    BILITOT 0.2 07/25/2023    CALCIUM 8.1 (L) 07/27/2023    CO2 22 07/27/2023     (H) 07/27/2023    CREATININE 1.69 (H) 07/27/2023    GLUCOSE 96 07/27/2023    ALKPHOS 116 07/25/2023    K 4.0 07/27/2023    PROT 5.2 (L) 07/25/2023     07/27/2023    AST 25 07/25/2023    ALT 8 07/25/2023    BUN 14 07/27/2023    ANIONGAP 14 07/27/2023    MG 2.18 07/27/2023    PHOS 4.7 07/27/2023    GGT 63 (H) 01/07/2021     04/30/2021    ALBUMIN 2.9 (L) 07/27/2023    LIPASE 30 01/24/2023     Lab Results   Component Value Date    TRIG 79 03/24/2023    CHOL 160 03/24/2023    HDL 70.9 03/24/2023     Lab Results   Component Value Date    HGBA1C 6.9 (A) 07/12/2023       DRUG INTERACTIONS  - No significant drug-drug interactions exist that require adjustment to therapy    CURRENT PHARMACOTHERAPY  - Eliquis 5 mg BID  - Trelegy 200-62.5-25 - 1 puff by mouth daily    PATIENT EDUCATION/DISCUSSION  - Informed patient that her  [employer] paperwork to receive a Dexcom is pending. Provided [facility] phone number if patient would like status updates  - Answered all patient questions and concerns; provided [facility] phone number if issues/questions arise    RECOMMENDATIONS/PLAN  Continue: Eliquis 5 mg BID and continue Trelegy 200-62.5-25 mg/actuation - 1 puff daily.   Start using Dexcom once patient receives from [employer].    Continue all meds under the continuation of care with the referring provider and clinical pharmacy team.    Clinical Pharmacist follow up: 8/25/2023 @ 11:00 am  Type of Encounter: Virtual    Thank you,  Stephanie Byers, PharmD    Verbal consent to manage patient's drug therapy was obtained from patient. They were informed they may decline to participate or withdraw from participation in pharmacy services at any time.

## 2023-08-25 ENCOUNTER — TELEMEDICINE (OUTPATIENT)
Dept: PHARMACY | Facility: HOSPITAL | Age: 63
End: 2023-08-25
Payer: MEDICARE

## 2023-08-25 DIAGNOSIS — Z79.899 MEDICATION MANAGEMENT: ICD-10-CM

## 2023-08-25 NOTE — PROGRESS NOTES
"Pharmacist Clinic: [facility] and Hospital Discharge    Bing Holliday was referred to the Clinical Pharmacy Team for financial assistance with Eliquis and [employer]; as well as hospital follow-up.    Referring Provider: Claudio Hood DO  Problem List Items Addressed This Visit    None  Visit Diagnoses       Medication management              HPI    Spoke with patient today - patient continues to check BG with glucometer about 4 times a day and states fasting blood glucose was \"low\" this morning, however \"blood sugars have been mostly normal.\" Denies hypoglycemia symptoms. Patient also denies shortness of breath, however has been coughing up mucous/itchy eyes and runny nose. Patient has not taken any medications for this.    MEDICATION ASSESSMENT    Allergies: Ace inhibitors, Hydroxychloroquine, Lisinopril, Penicillins, and Sulfa (sulfonamide antibiotics)     [id number] [place], [place] - [address] [facility] [address]  [address] [facility] [address]  Phone: [phone] Fax: [phone]    [facility] - [place], [place] - [address]  [address]  [address]  [facility] [address]  Phone: [phone] Fax: [phone]    LAB  Lab Results   Component Value Date    BILITOT 0.2 07/25/2023    CALCIUM 8.1 (L) 07/27/2023    CO2 22 07/27/2023     (H) 07/27/2023    CREATININE 1.69 (H) 07/27/2023    GLUCOSE 96 07/27/2023    ALKPHOS 116 07/25/2023    K 4.0 07/27/2023    PROT 5.2 (L) 07/25/2023     07/27/2023    AST 25 07/25/2023    ALT 8 07/25/2023    BUN 14 07/27/2023    ANIONGAP 14 07/27/2023    MG 2.18 07/27/2023    PHOS 4.7 07/27/2023    GGT 63 (H) 01/07/2021     04/30/2021    ALBUMIN 2.9 (L) 07/27/2023    LIPASE 30 01/24/2023     Lab Results   Component Value Date    TRIG 79 03/24/2023    CHOL 160 03/24/2023    HDL 70.9 03/24/2023     Lab Results   Component Value Date    HGBA1C 6.9 (A) 07/12/2023     DRUG INTERACTIONS  - No significant drug-drug interactions exist " that require adjustment to therapy    CURRENT PHARMACOTHERAPY  - Eliquis 5 mg BID  - Trelegy 200-62.5-25 - 1 puff by mouth daily    PATIENT EDUCATION/DISCUSSION  - Informed patient that her [employer] paperwork to receive a Dexcom is still pending, however should have a response within 48 hours.   - Answered all patient questions and concerns; provided [facility] phone number if issues/questions arise    RECOMMENDATIONS/PLAN  Continue: Eliquis 5 mg BID and continue Trelegy 200-62.5-25 mg/actuation - 1 puff daily.   Start using Dexcom once patient receives from [employer]. Will review administration/education once received    Continue all meds under the continuation of care with the referring provider and clinical pharmacy team.    Clinical Pharmacist follow up: 9/1/2023 @ 11:00 am  Type of Encounter: Virtual    Thank you,  Stephanie Byers, PharmD    Verbal consent to manage patient's drug therapy was obtained from patient. They were informed they may decline to participate or withdraw from participation in pharmacy services at any time.

## 2023-09-01 ENCOUNTER — APPOINTMENT (OUTPATIENT)
Dept: PHARMACY | Facility: HOSPITAL | Age: 63
End: 2023-09-01
Payer: MEDICARE

## 2023-09-06 ENCOUNTER — TELEMEDICINE (OUTPATIENT)
Dept: PHARMACY | Facility: HOSPITAL | Age: 63
End: 2023-09-06
Payer: MEDICARE

## 2023-09-06 ENCOUNTER — NURSING HOME VISIT (OUTPATIENT)
Dept: POST ACUTE CARE | Facility: EXTERNAL LOCATION | Age: 63
End: 2023-09-06

## 2023-09-06 DIAGNOSIS — R26.2 AMBULATORY DYSFUNCTION: ICD-10-CM

## 2023-09-06 DIAGNOSIS — E11.3591: ICD-10-CM

## 2023-09-06 DIAGNOSIS — Z79.899 MEDICATION MANAGEMENT: ICD-10-CM

## 2023-09-06 DIAGNOSIS — R73.9 HYPERGLYCEMIA: Primary | ICD-10-CM

## 2023-09-06 DIAGNOSIS — E78.5 HYPERLIPIDEMIA, UNSPECIFIED HYPERLIPIDEMIA TYPE: Primary | ICD-10-CM

## 2023-09-06 DIAGNOSIS — M32.9 LUPUS (MULTI): ICD-10-CM

## 2023-09-06 DIAGNOSIS — E16.2 HYPOGLYCEMIA: ICD-10-CM

## 2023-09-06 PROCEDURE — 99305 1ST NF CARE MODERATE MDM 35: CPT | Performed by: STUDENT IN AN ORGANIZED HEALTH CARE EDUCATION/TRAINING PROGRAM

## 2023-09-06 NOTE — PROGRESS NOTES
"Attempt 1: 11:59 - LVM  Attempt 2: 12:09 - reached patient    Pharmacist Clinic: [facility] and Hospital Discharge    Bing Holliday was referred to the Clinical Pharmacy Team for financial assistance with Eliquis and Trelegy; as well as hospital follow-up. Now being followed for CGM assistance and diabetes monitoring.    Referring Provider: Claudio Hood DO  Problem List Items Addressed This Visit       Hypoglycemia     Patient with recurrent hospital admissions. Most recent from August 27 to September 1, 2023 for \"acute change of mental status with decreased responsiveness and hypoglycemia\". Since discharge, patient has been feeling well. Will continue to await approval from [employer] for Dexcom G6 CGM.         Relevant Orders    Follow Up In [facility] - Pharmacy    Hyperglycemia - Primary    Relevant Orders    Follow Up In [facility] - Pharmacy     Other Visit Diagnoses       Medication management        Relevant Orders    Follow Up In [facility] - Pharmacy          HPI  Now in skilled facility, feeling okay.   Patient with hyperglycemia/hypoglycemia. Most recent A1c of 6.9% on 7/12/2023. Known diabetic complications: retinopathy. Optometry exam current (within 1year):  No, none found on chart review  Most recent visit in Podiatry - none found in chart review     Current diabetic medications include:  None at this time  On prednisone for Lupus (recent dose increase to help prevent hypoglycemic episodes)    Patient confirms above regimen.    Glucometer was not present at appointment. Patient tests - none lately while in facility  Waiting for approval of Dexcom G6 from [employer] - additional documentation sent to [employer] today    Any episodes of hypoglycemia? No, patient denies symptoms .  Did patient treat episode of hypoglycemia appropriately? N/A  Does pt have proteinuria? N/A If appropriate, is patient on ACEi/ARB? N/A  Does pt have diabetes identification card/bracelet? No, " denies  If no, was card/education provided? No, telephonic visit - advised patient to obtain some form of ID to keep on person.    Secondary Prevention  Statin? Yes, atorvastatin 40mg daily .  ACE-I/ARB? No,   .  Aspirin? No,   .    Pertinent PMH Review:  PMH of Pancreatitis: No  PMH of Retinopathy: Yes  PMH of Urinary Tract Infections: No  PMH of MTC: No      MEDICATION ASSESSMENT  Allergies: Ace inhibitors, Hydroxychloroquine, Lisinopril, Penicillins, and Sulfa (sulfonamide antibiotics)     [id number] [place], [place] - [address] [facility]  [address] [facility] [address]  Phone: [phone] Fax: [phone]    [facility] - [place], [place] - [address]  [address]  [address]  [facility] [address]  Phone: [phone] Fax: [phone]    LAB  Lab Results   Component Value Date    BILITOT 0.4 08/30/2023    CALCIUM 8.8 09/01/2023    CO2 23 09/01/2023     (H) 09/01/2023    CREATININE 1.85 (H) 09/01/2023    GLUCOSE 92 09/01/2023    ALKPHOS 111 08/30/2023    K 4.2 09/01/2023    PROT 6.5 08/30/2023     09/01/2023    AST 26 08/30/2023    ALT 18 08/30/2023    BUN 37 (H) 09/01/2023    ANIONGAP 12 09/01/2023    MG 2.36 08/30/2023    PHOS 3.6 09/01/2023    GGT 63 (H) 01/07/2021     04/30/2021    ALBUMIN 3.1 (L) 09/01/2023    LIPASE 30 01/24/2023     Lab Results   Component Value Date    TRIG 79 03/24/2023    CHOL 160 03/24/2023    HDL 70.9 03/24/2023     Lab Results   Component Value Date    HGBA1C 6.9 (A) 07/12/2023     DRUG INTERACTIONS  - No significant drug-drug interactions exist that require adjustment to therapy    CURRENT PHARMACOTHERAPY  - Eliquis 5 mg BID  - Trelegy 200-62.5-25 - 1 puff by mouth daily    PATIENT EDUCATION/DISCUSSION  - Informed patient that [employer] required additional documentation to show hospitalizations and this was sent today. Should receive word within a week.  - Answered all patient questions and concerns; provided [facility]  phone number if issues/questions arise    RECOMMENDATIONS/PLAN  Continue: Eliquis 5 mg BID and continue Trelegy 200-62.5-25 mg/actuation - 1 puff daily.   Start using Dexcom once patient receives from [facility]. Will review administration/education once received    Time spent with pt: Total length of time 12 (minutes) of the encounter and more than 50% was spent counseling the patient.    Clinical Pharmacist follow up: 9/15/2023 at 9:30 via telephone to review if Dexcom approved. May consider in person visit to aid in placement.    Nesha Foley PharmD, JACQUELINE  Clinical Pharmacist  Pharmacy Services  [phone]    Continue all meds under the continuation of care with the referring provider and clinical pharmacy team.    Verbal consent to manage patient's drug therapy was obtained from patient. They were informed they may decline to participate or withdraw from participation in pharmacy services at any time.

## 2023-09-06 NOTE — ASSESSMENT & PLAN NOTE
"Patient with recurrent hospital admissions. Most recent from August 27 to September 1, 2023 for \"acute change of mental status with decreased responsiveness and hypoglycemia\". Since discharge, patient has been feeling well. Will continue to await approval from [employer] for [employer] G6 CGM.  "
hair removal not indicated
0

## 2023-09-06 NOTE — LETTER
Patient: Bing Holliday  : 1960    Encounter Date: 2023    Subjective  Patient ID: Bing Holliday is a 62 y.o. female.    Patient is a 62-year-old female with pAfib, CKD, GERD, adrenal insufficiency, lupus who recently was admitted to [facility] and episode of altered mental status.  Patient was found to be slightly hypotensive and slightly tachycardic.  Chest x-ray imaging, found to have multifocal pneumonia.  Patient was admitted for further management.  She was treated with IV antibiotics and supportive care for sepsis secondary to pneumonia.  Patient was then discharged to SNF in stable condition.  On evaluation, patient overall feels well.  States that he is overall improving and strength is coming back.  She states no acute issues or concerns. Working well with therapy. Regards no acute issues.        Review of Systems   Constitutional:  Positive for fatigue.   HENT: Negative.     Respiratory:  Positive for shortness of breath.    Cardiovascular: Negative.    Gastrointestinal: Negative.    Musculoskeletal: Negative.    Neurological: Negative.    Psychiatric/Behavioral: Negative.         ObjectiveVitals Reviewed via facility EMR   Physical Exam  Constitutional:       General: She is not in acute distress.     Appearance: She is not ill-appearing.   HENT:      Mouth/Throat:      Mouth: Mucous membranes are moist.      Pharynx: Oropharynx is clear. No oropharyngeal exudate or posterior oropharyngeal erythema.   Eyes:      Pupils: Pupils are equal, round, and reactive to light.   Cardiovascular:      Rate and Rhythm: Normal rate and regular rhythm.      Heart sounds: No murmur heard.  Pulmonary:      Effort: Pulmonary effort is normal. No respiratory distress.      Breath sounds: No wheezing.   Abdominal:      General: Abdomen is flat. Bowel sounds are normal. There is no distension.      Palpations: Abdomen is soft.      Tenderness: There is no abdominal tenderness.   Musculoskeletal:          General: No tenderness. Normal range of motion.   Skin:     General: Skin is warm and dry.   Neurological:      General: No focal deficit present.      Mental Status: She is alert and oriented to person, place, and time. Mental status is at baseline.   Psychiatric:         Mood and Affect: Mood normal.         Assessment/Plan  Diagnoses and all orders for this visit:  Hyperlipidemia, unspecified hyperlipidemia type  Hypoglycemia  Proliferative diabetic retinopathy of right eye without macular edema determined by examination associated with type 2 diabetes mellitus (CMS/[facility])  Lupus (CMS/[facility])  Ambulatory dysfunction  Patient seen and examined.  Hospital course reviewed.  We will start glucose checks as patient does have a tendency to become hypoglycemic.  Continue supportive care PT OT closely monitor weekly labs.        Electronically Signed By: Ayaan Koehler DO   9/7/23  9:45 PM

## 2023-09-07 ASSESSMENT — ENCOUNTER SYMPTOMS
CARDIOVASCULAR NEGATIVE: 1
GASTROINTESTINAL NEGATIVE: 1
SHORTNESS OF BREATH: 1
NEUROLOGICAL NEGATIVE: 1
MUSCULOSKELETAL NEGATIVE: 1
FATIGUE: 1
PSYCHIATRIC NEGATIVE: 1

## 2023-09-08 NOTE — PROGRESS NOTES
Subjective   Patient ID: Bing Holliday is a 62 y.o. female.    Patient is a 62-year-old female with pAfib, CKD, GERD, adrenal insufficiency, lupus who recently was admitted to [facility] and episode of altered mental status.  Patient was found to be slightly hypotensive and slightly tachycardic.  Chest x-ray imaging, found to have multifocal pneumonia.  Patient was admitted for further management.  She was treated with IV antibiotics and supportive care for sepsis secondary to pneumonia.  Patient was then discharged to SNF in stable condition.  On evaluation, patient overall feels well.  States that he is overall improving and strength is coming back.  She states no acute issues or concerns. Working well with therapy. Regards no acute issues.        Review of Systems   Constitutional:  Positive for fatigue.   HENT: Negative.     Respiratory:  Positive for shortness of breath.    Cardiovascular: Negative.    Gastrointestinal: Negative.    Musculoskeletal: Negative.    Neurological: Negative.    Psychiatric/Behavioral: Negative.         Objective Vitals Reviewed via facility EMR   Physical Exam  Constitutional:       General: She is not in acute distress.     Appearance: She is not ill-appearing.   HENT:      Mouth/Throat:      Mouth: Mucous membranes are moist.      Pharynx: Oropharynx is clear. No oropharyngeal exudate or posterior oropharyngeal erythema.   Eyes:      Pupils: Pupils are equal, round, and reactive to light.   Cardiovascular:      Rate and Rhythm: Normal rate and regular rhythm.      Heart sounds: No murmur heard.  Pulmonary:      Effort: Pulmonary effort is normal. No respiratory distress.      Breath sounds: No wheezing.   Abdominal:      General: Abdomen is flat. Bowel sounds are normal. There is no distension.      Palpations: Abdomen is soft.      Tenderness: There is no abdominal tenderness.   Musculoskeletal:         General: No tenderness. Normal range of motion.   Skin:     General:  Skin is warm and dry.   Neurological:      General: No focal deficit present.      Mental Status: She is alert and oriented to person, place, and time. Mental status is at baseline.   Psychiatric:         Mood and Affect: Mood normal.         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)  Lupus (CMS/[facility])  Ambulatory dysfunction  Patient seen and examined.  Hospital course reviewed.  We will start glucose checks as patient does have a tendency to become hypoglycemic.  Continue supportive care PT OT closely monitor weekly labs.

## 2023-09-11 ENCOUNTER — NURSING HOME VISIT (OUTPATIENT)
Dept: POST ACUTE CARE | Facility: EXTERNAL LOCATION | Age: 63
End: 2023-09-11
Payer: MEDICARE

## 2023-09-11 DIAGNOSIS — E11.3591: ICD-10-CM

## 2023-09-11 DIAGNOSIS — M32.9 LUPUS (MULTI): ICD-10-CM

## 2023-09-11 DIAGNOSIS — J18.9 PNEUMONIA OF BOTH LOWER LOBES DUE TO INFECTIOUS ORGANISM: ICD-10-CM

## 2023-09-11 DIAGNOSIS — E78.5 HYPERLIPIDEMIA, UNSPECIFIED HYPERLIPIDEMIA TYPE: Primary | ICD-10-CM

## 2023-09-11 DIAGNOSIS — N17.9 AKI (ACUTE KIDNEY INJURY) (CMS-HCC): ICD-10-CM

## 2023-09-11 PROCEDURE — 99308 SBSQ NF CARE LOW MDM 20: CPT | Performed by: STUDENT IN AN ORGANIZED HEALTH CARE EDUCATION/TRAINING PROGRAM

## 2023-09-11 NOTE — LETTER
Patient: Bing Holliday  : 1960    Encounter Date: 2023    Subjective  Patient ID: Bing Holliday is a 62 y.o. female.    Pt seen resting in bed. She reports that she is doing well and feels back to her baseline. She is hopeful for discharge soon. Regards her swelling is much improved. States no additional issues or concerns.        Review of Systems   Constitutional: Negative.    HENT: Negative.     Respiratory: Negative.     Cardiovascular: Negative.    Gastrointestinal: Negative.    Musculoskeletal: Negative.    Neurological: Negative.    Psychiatric/Behavioral: Negative.         ObjectiveVitals Reviewed via facility EMR   Physical Exam  Constitutional:       General: She is not in acute distress.     Appearance: She is not ill-appearing.   HENT:      Mouth/Throat:      Mouth: Mucous membranes are moist.      Pharynx: Oropharynx is clear. No oropharyngeal exudate or posterior oropharyngeal erythema.   Eyes:      Pupils: Pupils are equal, round, and reactive to light.   Cardiovascular:      Rate and Rhythm: Normal rate and regular rhythm.      Heart sounds: No murmur heard.  Pulmonary:      Effort: Pulmonary effort is normal. No respiratory distress.      Breath sounds: No wheezing.   Abdominal:      General: Abdomen is flat. Bowel sounds are normal. There is no distension.      Palpations: Abdomen is soft.      Tenderness: There is no abdominal tenderness.   Musculoskeletal:         General: No tenderness. Normal range of motion.   Skin:     General: Skin is warm and dry.   Neurological:      General: No focal deficit present.      Mental Status: She is alert and oriented to person, place, and time. Mental status is at baseline.   Psychiatric:         Mood and Affect: Mood normal.         Assessment/Plan  Diagnoses and all orders for this visit:  Hyperlipidemia, unspecified hyperlipidemia type  Proliferative diabetic retinopathy of right eye without macular edema determined by examination  associated with type 2 diabetes mellitus (CMS/HCC)  JED (acute kidney injury) (CMS/HCC)  Lupus (CMS/HCC)  Pneumonia of both lower lobes due to infectious organism      Pt seen and examined, overall medically stable for discharge. Advise for patient to have a close follow up as an outpatient with PCP. Pt agreeable with plan.      Electronically Signed By: Ayaan Koehler DO   9/14/23  7:50 PM

## 2023-09-12 ENCOUNTER — PATIENT OUTREACH (OUTPATIENT)
Dept: PRIMARY CARE | Facility: CLINIC | Age: 63
End: 2023-09-12
Payer: MEDICARE

## 2023-09-12 DIAGNOSIS — R44.3 HALLUCINATIONS: ICD-10-CM

## 2023-09-12 DIAGNOSIS — N17.9 AKI (ACUTE KIDNEY INJURY) (CMS-HCC): ICD-10-CM

## 2023-09-12 DIAGNOSIS — M32.9 LUPUS (MULTI): ICD-10-CM

## 2023-09-12 DIAGNOSIS — E11.42 DIABETIC POLYNEUROPATHY ASSOCIATED WITH TYPE 2 DIABETES MELLITUS (MULTI): ICD-10-CM

## 2023-09-12 DIAGNOSIS — R41.3 MEMORY CHANGES: ICD-10-CM

## 2023-09-12 DIAGNOSIS — E27.2 ADRENAL CRISIS (MULTI): ICD-10-CM

## 2023-09-12 DIAGNOSIS — J18.9 PNEUMONIA OF BOTH LOWER LOBES DUE TO INFECTIOUS ORGANISM: ICD-10-CM

## 2023-09-12 PROCEDURE — 99490 CHRNC CARE MGMT STAFF 1ST 20: CPT | Performed by: FAMILY MEDICINE

## 2023-09-12 NOTE — PROGRESS NOTES
Discharge facility: [facility]  Discharge diagnosis: Acute encephalopathy secondary to community-acquired pneumonia  Admission date: 9/1/23  Discharge date: 9/11/23    PCP Appointment Date: 9/14  Hospital Encounter and Summary: not available at this time (Awaiting discharge summary and medication list from [place]; currently their fax is down)  See Discharge assessment below for further details.     Engagement  Call Start Time: 1415 (9/12/2023 11:07 AM)    Medications  Medications reviewed with patient/caregiver?: No (Awaiting discharge med list from [place].) (9/12/2023 11:07 AM)  Does the patient have all medications ordered at discharge?: No (9/12/2023 11:07 AM)  Care Management Interventions: No intervention needed (9/12/2023 11:07 AM)  Is the patient taking all medications as directed (includes completed medication regime)?: Not applicable (9/12/2023 11:07 AM)  Care Management Interventions: Notified provider (9/12/2023 11:07 AM)    Appointments  Does the patient have a primary care provider?: Yes (9/12/2023 11:07 AM)  Care Management Interventions: Advised patient to make appointment; Verified appointment date/time/provider; Educated patient on importance of making appointment (9/12/2023 11:07 AM)  Has the patient kept scheduled appointments due by today?: Yes (9/12/2023 11:07 AM)  Care Management Interventions: Advised patient to keep appointment; Educated on importance of keeping appointment (9/12/2023 11:07 AM)  Follow Up Tasks: Appointments (9/12/2023 11:07 AM)    Self Management  What is the home health agency?: [facility] (9/12/2023 11:07 AM)  Has home health visited the patient within 72 hours of discharge?: Call prior to 72 hours (9/12/2023 11:07 AM)  Care Management Interventions: Notified PCP/provider (9/12/2023 11:07 AM)    Patient Teaching  Does the patient have access to their discharge instructions?: Yes (9/12/2023 11:07 AM)  What is the patient's perception of  their health status since discharge?: Same (PAtient states she is doing better. Son Expressed concern that she is still not herself.) (9/12/2023 11:07 AM)    Wrap Up  Call End Time: 1420 (9/12/2023 11:07 AM)

## 2023-09-12 NOTE — PROGRESS NOTES
Chart review complete.     Monthly outreach complete.    Spoke to patient son, López. He expresses concern that Bing is still not herself. Explains that this morning he went to wake her up at 9:30 AM. She was hard to wake up, so he took her sugar and it was 50. States that's low but not horribly low for her. He gave her apple juice and boost to increase glucose levels. Which brought her sugar to 104, even with resolution of blood sugar she was still very groggy and did not fully wake up and get up till 2 PM. Previously we've been trying to acquire a CGM for patient, López says they still do not have it. However, he isn't sure that her sugars are the main cause for her recent decrease in cognition. Said that her last hospital admission was related to to the same issue, they gave her oral glucose and got her sugars up but she still was not herself at that time. She was later admitted with encephalopathy secondary to community-acquired pneumonia. We dicussed referrals to neurology and endocrinology and López is receptive. Orders placed and sent to appropriate offices.     Call placed back to Bing as after my conversation with López she was in the bathroom. Assessment performed over the phone with patient. She does acknowledge some trouble with walking and lack of coordination. She states this is not out of the norm for her though. Patient is A+Ox2-3. She was unable to tell me what day of the week it was but was able to tell me the year and who the president is (when I spoke to lópez, her not knowing the day of the week is not baseline). I requested patient to perform a bilateral arm drift test. She denies any issues with it. Patient does seem a bit off neurologically but denies any issues herself.     Call placed to López to notify of my findings. I advised son that I do see a change in Bing as well and recommended ER. He states her going back to the hospital would be a huge fight and that she would  likely refuse. I offered to speak to Bing again regarding my recommendation however he would like to see how patient is in the morning or as the day goes on since she really just got up. López and this nurse talked for awhile regarding Bing and this nurse was wondering if patient would maybe be depressed. López says that his sister did menton that she was thinking Bing seem a little depressed and hopeless when she spoke to Bing while still in [place]. While in [place] they notified the nurse but don't believe anything was done. While in [place] López states that Bing also never participated in activates and stayed to herself. States that since coming home patient has been very unstable on her feet. She does have order for [facility] in. López does agree with referral also to  within practice.     Ultimately I recommended ER visit with everything going on with Bing at this time. López plans to see how today goes and will keep a close eye on her before making that decision. However, if things remain this way they believe a [facility] facility may be a better fit. At this time they request to keep appointment for Thursday and go from there.     Referrals placed to neurology, endocrinology and Internal Behavioral Health.

## 2023-09-13 DIAGNOSIS — R60.0 BILATERAL LEG EDEMA: ICD-10-CM

## 2023-09-13 RX ORDER — TORSEMIDE 10 MG/1
10 TABLET ORAL DAILY
Qty: 30 TABLET | Refills: 2 | Status: SHIPPED | OUTPATIENT
Start: 2023-09-13 | End: 2023-12-13

## 2023-09-14 ENCOUNTER — OFFICE VISIT (OUTPATIENT)
Dept: PRIMARY CARE | Facility: CLINIC | Age: 63
End: 2023-09-14
Payer: MEDICARE

## 2023-09-14 VITALS
RESPIRATION RATE: 16 BRPM | DIASTOLIC BLOOD PRESSURE: 70 MMHG | SYSTOLIC BLOOD PRESSURE: 110 MMHG | TEMPERATURE: 96.8 F | WEIGHT: 207.8 LBS | BODY MASS INDEX: 30.78 KG/M2 | HEIGHT: 69 IN | HEART RATE: 70 BPM | OXYGEN SATURATION: 97 %

## 2023-09-14 DIAGNOSIS — M25.50 ARTHRALGIA, UNSPECIFIED JOINT: ICD-10-CM

## 2023-09-14 DIAGNOSIS — D64.9 ANEMIA, UNSPECIFIED TYPE: ICD-10-CM

## 2023-09-14 DIAGNOSIS — I77.89 LUPUS VASCULITIS (MULTI): Primary | ICD-10-CM

## 2023-09-14 DIAGNOSIS — M32.19 LUPUS VASCULITIS (MULTI): Primary | ICD-10-CM

## 2023-09-14 DIAGNOSIS — G93.40 ACUTE ENCEPHALOPATHY: ICD-10-CM

## 2023-09-14 DIAGNOSIS — Z09 HOSPITAL DISCHARGE FOLLOW-UP: ICD-10-CM

## 2023-09-14 DIAGNOSIS — E11.3591: ICD-10-CM

## 2023-09-14 DIAGNOSIS — D46.9 MYELODYSPLASTIC SYNDROME, UNSPECIFIED (MULTI): ICD-10-CM

## 2023-09-14 DIAGNOSIS — A41.9 SEPSIS, DUE TO UNSPECIFIED ORGANISM, UNSPECIFIED WHETHER ACUTE ORGAN DYSFUNCTION PRESENT (MULTI): ICD-10-CM

## 2023-09-14 PROCEDURE — 99495 TRANSJ CARE MGMT MOD F2F 14D: CPT | Performed by: FAMILY MEDICINE

## 2023-09-14 PROCEDURE — 3066F NEPHROPATHY DOC TX: CPT | Performed by: FAMILY MEDICINE

## 2023-09-14 PROCEDURE — 1036F TOBACCO NON-USER: CPT | Performed by: FAMILY MEDICINE

## 2023-09-14 PROCEDURE — 3074F SYST BP LT 130 MM HG: CPT | Performed by: FAMILY MEDICINE

## 2023-09-14 PROCEDURE — 3078F DIAST BP <80 MM HG: CPT | Performed by: FAMILY MEDICINE

## 2023-09-14 PROCEDURE — 3044F HG A1C LEVEL LT 7.0%: CPT | Performed by: FAMILY MEDICINE

## 2023-09-14 RX ORDER — PREDNISONE 5 MG/1
TABLET ORAL
Qty: 90 TABLET | Refills: 0 | Status: SHIPPED | OUTPATIENT
Start: 2023-09-14 | End: 2023-10-16

## 2023-09-14 ASSESSMENT — ENCOUNTER SYMPTOMS
CARDIOVASCULAR NEGATIVE: 1
GASTROINTESTINAL NEGATIVE: 1
PSYCHIATRIC NEGATIVE: 1
MUSCULOSKELETAL NEGATIVE: 1
NEUROLOGICAL NEGATIVE: 1
CONSTITUTIONAL NEGATIVE: 1
RESPIRATORY NEGATIVE: 1

## 2023-09-14 NOTE — PROGRESS NOTES
Subjective   Patient ID: Bing Holliday is a 62 y.o. female.    Pt seen resting in bed. She reports that she is doing well and feels back to her baseline. She is hopeful for discharge soon. Regards her swelling is much improved. States no additional issues or concerns.        Review of Systems   Constitutional: Negative.    HENT: Negative.     Respiratory: Negative.     Cardiovascular: Negative.    Gastrointestinal: Negative.    Musculoskeletal: Negative.    Neurological: Negative.    Psychiatric/Behavioral: Negative.         Objective Vitals Reviewed via facility EMR   Physical Exam  Constitutional:       General: She is not in acute distress.     Appearance: She is not ill-appearing.   HENT:      Mouth/Throat:      Mouth: Mucous membranes are moist.      Pharynx: Oropharynx is clear. No oropharyngeal exudate or posterior oropharyngeal erythema.   Eyes:      Pupils: Pupils are equal, round, and reactive to light.   Cardiovascular:      Rate and Rhythm: Normal rate and regular rhythm.      Heart sounds: No murmur heard.  Pulmonary:      Effort: Pulmonary effort is normal. No respiratory distress.      Breath sounds: No wheezing.   Abdominal:      General: Abdomen is flat. Bowel sounds are normal. There is no distension.      Palpations: Abdomen is soft.      Tenderness: There is no abdominal tenderness.   Musculoskeletal:         General: No tenderness. Normal range of motion.   Skin:     General: Skin is warm and dry.   Neurological:      General: No focal deficit present.      Mental Status: She is alert and oriented to person, place, and time. Mental status is at baseline.   Psychiatric:         Mood and Affect: Mood normal.         Assessment/Plan   Diagnoses and all orders for this visit:  Hyperlipidemia, unspecified hyperlipidemia type  Proliferative diabetic retinopathy of right eye without macular edema determined by examination associated with type 2 diabetes mellitus (CMS/[facility])  JED (acute kidney injury)  (CMS/HCC)  Lupus (CMS/HCC)  Pneumonia of both lower lobes due to infectious organism      Pt seen and examined, overall medically stable for discharge. Advise for patient to have a close follow up as an outpatient with PCP. Pt agreeable with plan.

## 2023-09-14 NOTE — PROGRESS NOTES
Subjective   Patient ID: Bing Holliday is a 62 y.o. female who presents for Hospital Follow-up.  [facility]  Hospital follow up.    KRISTAL complete.   Admitted to [facility].   Admission dates 8/27/23 to 9/1/23.   Admitted for Sepsis, Acute encephalopathy secondary to community-acquired pneumonia   Days since discharge 13 days.   Patient had BW, EKG, CT of head, CT abdomen and pelvis, XR chest, Blood cultures, UA,  UC  Patient advised to follow up with PCP.   Pt states that she still feels weak and shaky.   Patient was prescribed Dronabinol. States she is not taking this because of insurance. States she slept good when she was getting it at the nursing home.   Was supposed to have therapy at home today, but they didn't show up.     Is waking up a lot at night since she's been home. Some days she wakes up at 7 AM. Sometimes she wakes up at 12 or 3 PM.     Sugars have been dropping into the 40's.   Son will go to her room to check her sugar every 10 minutes.   Eats 3 meals a day, and a snack at night.  Still hasn't been able to get her continuous glucose monitor.     Is anemic. Last colonoscopy 2018. Was supposed to recheck in 2021. In January 2023 she had a FECL Occult done, and it was positive. States she has abdominal pain at times.     We decreased her Eliquis because of her kidney function and also with this anemia could be part of that but eventually will need this: Checked if her blood counts are going up        No other concerns      Review of systems  ; Patient seen today for exam denies any problems with headaches or vision, denies any shortness of breath chest pain nausea or vomiting, no black stool no blood in the stool no heartburn type symptoms denies any problems with constipation or diarrhea, and no dysuria-type symptoms    The patient's allergies medications were reviewed with them today    The patient's social family and surgical history or also reviewed here today, along with her past medical history.  "    Objective     Alert and active in  no acute distress  HEENT TMs clear oropharynx negative nares clear no drainage noted neck supple  With no adenopathy   Heart regular rate and rhythm without murmur and no carotid bruits  Lungs- clear to auscultation bilaterally, no wheeze or rhonchi noted  Thyroid -negative masses or nodularity  Abdomen- soft times four quadrants , bowel sounds positive no masses or organomegaly, negative tenderness guarding or rebound  Neurological exam unremarkable- DTRs in upper and lower extremities within normal limits.   skin -no lesions noted      /70 (BP Location: Left arm, Patient Position: Sitting, BP Cuff Size: Adult)   Pulse 70   Temp 36 °C (96.8 °F) (Temporal)   Resp 16   Ht 1.753 m (5' 9\")   Wt 94.3 kg (207 lb 12.8 oz)   LMP  (LMP Unknown)   SpO2 97%   BMI 30.69 kg/m²     Allergies   Allergen Reactions    Ace Inhibitors Other, Angioedema and Swelling     \"FACIAL TWISTING\"    'FACIAL TWISTING\" \"LOOKS LIKE I HAD A STROKE IN MY SLEEP\"    Hydroxychloroquine Other     RETINAL BLEEDING    RETINAL BLEEDING, Eye problems    Lisinopril Other    Penicillins Unknown     tolerates cephalosporins    From Childhood    Mbr sts that she was told since young that she is allergic    Sulfa (Sulfonamide Antibiotics) Hives       Assessment/Plan   Problem List Items Addressed This Visit       Myelodysplastic syndrome, unspecified (CMS/HCC)    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HCC) - Primary    Proliferative diabetic retinopathy of right eye without macular edema determined by examination associated with type 2 diabetes mellitus (CMS/HCC)     Other Visit Diagnoses       Hospital discharge follow-up        Sepsis, due to unspecified organism, unspecified whether acute organ dysfunction present (CMS/HCC)        Acute encephalopathy        Anemia, unspecified type        Relevant Orders    Basic metabolic panel    CBC and Auto Differential    Iron and TIBC    Vitamin B12    Arthralgia, unspecified joint  "       Relevant Medications    predniSONE (Deltasone) 5 mg tablet            Long talk. Treatment options reviewed.  [facility] hospital records reviewed.   Blood count 8, and it should be 12-13.   Make sure to eat some meat with dinner.   May need to have another colonoscopy done.   Decrease Eliquis to 2.5 MG two times a day.   Let us know if we need to set you up with home therapy.   Repeat blood work, and iron levels to be done in 2 weeks.  Sugars discussed. A1C in July was 6.9. Make sure you snack a little more often.  Will increase Prednisone to 3 tablets for 7 days, then 2 tablets for 7 days.     Continue and take your medications as prescribed.    Health Maintenance issues discussed.    Importance of healthy diet and regular exercise regimen discussed.    We will contact you with any test results ordered. If you do not hear from us, please contact.    If anything worsens or changes please call us at once, follow up in the office as planned.      Scribe Attestation  By signing my name below, IBrooks RMA, Scribe   attest that this documentation has been prepared under the direction and in the presence of Claudio Hood DO.

## 2023-09-15 ENCOUNTER — TELEMEDICINE (OUTPATIENT)
Dept: PHARMACY | Facility: HOSPITAL | Age: 63
End: 2023-09-15
Payer: MEDICARE

## 2023-09-15 DIAGNOSIS — E16.2 HYPOGLYCEMIA: ICD-10-CM

## 2023-09-15 DIAGNOSIS — Z79.899 MEDICATION MANAGEMENT: ICD-10-CM

## 2023-09-15 DIAGNOSIS — R73.9 HYPERGLYCEMIA: Primary | ICD-10-CM

## 2023-09-15 NOTE — PROGRESS NOTES
Pharmacist Clinic: [facility] and [facility] Discharge    Bing Holliday was referred to the Clinical Pharmacy Team for financial assistance with Eliquis and [employer]; as well as hospital follow-up. Now being followed for CGM assistance and diabetes monitoring.    Referring Provider: Claudio Hood DO    Interval history:  Still awaiting approval for Dexcom G6 for [employer] - additional documentation required by insurance was sent on . Follow up on  indicated that order was cancelled, so new order was placed. [employer] representative stated should take ~5 days to process.    At home, feeling ok  Saw PCP yesterday increased prednisone to help with hypoglycemia    Reports eating 3 meals per day with some snacks - usually has rice and vegetables. Advised to have protein with each meal or snack to help sustain elevated sugar rather than drop down.      HPI  Diabetes Mellitus Type 2:  Diagnosed with diabetes:  >1 year. Known diabetic complications: retinopathy. Optometry exam current (within 1year):  No, none found in chart  Most recent visit in Podiatry was on none found in chart-- patient denies sores or cuts on feet today      Current diabetic medications include:  None at this time  On prednisone for Lupus (recent dose increase to help prevent hypoglycemic episodes)    Glucometer was NOT present at appointment. Patient tests SMBGS sometimes  Home blood glucose records (mg/dL)  FB-70s  Bedtime: 70s    Any episodes of hypoglycemia? Yes, with symptoms - into the 40s .  Did patient treat episode of hypoglycemia appropriately? Yes,    Does pt have proteinuria? N/A If appropriate, is patient on ACEi/ARB? N/A  Does pt have diabetes identification card/bracelet? No, denies If no, was card/education provided? No, telephonic visit - advised patient to obtain some form of ID to keep on person at last visit    Secondary Prevention  Statin? Yes, atorvastatin 40mg daily .  ACE-I/ARB? No,   .  Aspirin? No,   .    Pertinent  PMH Review:  PMH of Pancreatitis: No  PMH of Retinopathy: Yes  PMH of Urinary Tract Infections: No  PMH of MTC: No      MEDICATION ASSESSMENT  Allergies: Ace inhibitors, Hydroxychloroquine, Lisinopril, Penicillins, and Sulfa (sulfonamide antibiotics)     [id number] [place], [place] - [address]  [address]  [place] [address]  Phone: [phone] Fax: [phone]    [facility] - [place], [place] - [address]  [address]  [address]  [facility] [address]  Phone: [phone] Fax: [phone]    LAB  Lab Results   Component Value Date    BILITOT 0.4 08/30/2023    CALCIUM 8.8 09/01/2023    CO2 23 09/01/2023     (H) 09/01/2023    CREATININE 1.85 (H) 09/01/2023    GLUCOSE 92 09/01/2023    ALKPHOS 111 08/30/2023    K 4.2 09/01/2023    PROT 6.5 08/30/2023     09/01/2023    AST 26 08/30/2023    ALT 18 08/30/2023    BUN 37 (H) 09/01/2023    ANIONGAP 12 09/01/2023    MG 2.36 08/30/2023    PHOS 3.6 09/01/2023    GGT 63 (H) 01/07/2021     04/30/2021    ALBUMIN 3.1 (L) 09/01/2023    LIPASE 30 01/24/2023     Lab Results   Component Value Date    TRIG 79 03/24/2023    CHOL 160 03/24/2023    HDL 70.9 03/24/2023     Lab Results   Component Value Date    HGBA1C 6.9 (A) 07/12/2023     DRUG INTERACTIONS  - No significant drug-drug interactions exist that require adjustment to therapy    CURRENT PHARMACOTHERAPY  - Eliquis 5 mg BID  - Trelegy 200-62.5-25 - 1 puff by mouth daily    PATIENT EDUCATION/DISCUSSION  - Informed patient that [employer] required additional documentation to show hospitalizations and this was sent today. Should receive word within a week.  - Answered all patient questions and concerns; provided [facility] phone number if issues/questions arise      Hyperglycemia  Patient identified self-management goal:  Minimize hypoglycemia  Patient's goal A1c is < 7%.  Is pt at goal? Yes, 6.9% in 7/2023  Patient's SMBGs are well below goal   Rationale for  plan: A1c at goal, no SMBGS available. Continue without anti-diabetic medication. Will assess need after receiving CGM results.    Medication Changes: N/A    Monitoring and Education:  Rule of 15: eating ~15 g of carbs when BG less than 80 (half cup juice, 3-4 glucose tabs).  Always carry hard candy, small can of regular soda or juice, etc. In case an episode occurs.  Recognize symptoms of high and low blood sugar.   Eat a realistic healthy diet consisting of fruits, vegetables, fiber, protein food choices on a regular basis and be aware of portion/serving sizes. Reduce carbohydrate consumption and always consume with protein and fat. Avoid foods high in saturated/trans fat, high salt content, and sweets and beverages with added sugars.  Limit alcohol consumption; alcohol may affect your blood sugar and cause hypoglycemia.   Stay active and incorporate ~30 mins of exercise into your daily routine to manage your weight and increase the body's acceptance of insulin.      Patient stated she believes that she and her son will be able to figure out how to apply the Dexcom and will continue with telephonic appointments. Will follow [facility] next Friday [facility]     Time spent with pt: Total length of time 10 (minutes) of the encounter and more than 50% was spent counseling the patient.    Clinical Pharmacist follow up: 9/22 @ 9:30am    Nesha Foley PharmD, JACQUELINE  Clinical Pharmacist  Pharmacy Services  [phone]    Continue all meds under the continuation of care with the referring provider and clinical pharmacy team.    Verbal consent to manage patient's drug therapy was obtained from patient. They were informed they may decline to participate or withdraw from participation in pharmacy services at any time.

## 2023-09-15 NOTE — ASSESSMENT & PLAN NOTE
Patient identified self-management goal:  Minimize hypoglycemia  Patient's goal A1c is < 7%.  Is pt at goal? Yes, 6.9% in 7/2023  Patient's SMBGs are well below goal   Rationale for plan: A1c at goal, no SMBGS available. Continue without anti-diabetic medication. Will assess need after receiving CGM results.    Medication Changes: N/A    Monitoring and Education:  Rule of 15: eating ~15 g of carbs when BG less than 80 (half cup juice, 3-4 glucose tabs).  Always carry hard candy, small can of regular soda or juice, etc. In case an episode occurs.  Recognize symptoms of high and low blood sugar.   Eat a realistic healthy diet consisting of fruits, vegetables, fiber, protein food choices on a regular basis and be aware of portion/serving sizes. Reduce carbohydrate consumption and always consume with protein and fat. Avoid foods high in saturated/trans fat, high salt content, and sweets and beverages with added sugars.  Limit alcohol consumption; alcohol may affect your blood sugar and cause hypoglycemia.   Stay active and incorporate ~30 mins of exercise into your daily routine to manage your weight and increase the body's acceptance of insulin.

## 2023-09-22 ENCOUNTER — TELEMEDICINE (OUTPATIENT)
Dept: PHARMACY | Facility: HOSPITAL | Age: 63
End: 2023-09-22
Payer: MEDICARE

## 2023-09-22 DIAGNOSIS — E16.2 HYPOGLYCEMIA: ICD-10-CM

## 2023-09-22 DIAGNOSIS — Z79.899 MEDICATION MANAGEMENT: ICD-10-CM

## 2023-09-22 DIAGNOSIS — R73.9 HYPERGLYCEMIA: ICD-10-CM

## 2023-09-22 NOTE — ASSESSMENT & PLAN NOTE
Patient identified self-management goal:  Minimize hypoglycemia  Patient's goal A1c is < 7%.  Is pt at goal? Yes, 6.9% in 7/2023  Patient's SMBGs are well below goal   Rationale for plan: A1c at goal, no SMBGS available. Continue without anti-diabetic medication. Will assess need after receiving CGM results.     Medication Changes: N/A     Monitoring and Education:  Rule of 15: eating ~15 g of carbs when BG less than 80 (half cup juice, 3-4 glucose tabs).  Always carry hard candy, small can of regular soda or juice, etc. In case an episode occurs.  Emphasized eating a protein with each snack

## 2023-09-22 NOTE — PROGRESS NOTES
Pharmacist Clinic: [facility] and Hospital Discharge    Bing Holliday was referred to the Clinical Pharmacy Team for financial assistance with Eliquis and Trelegy; as well as hospital follow-up. Now being followed for CGM assistance and diabetes monitoring.    Referring Provider: Claudio Hood,     Interval history:  Spoke with [employer] representative on 23 who confirmed that the order was in process. Patient states she has not yet been contacted by [employer]. I will reach out again today.    Still awaiting approval for Dexcom G6 for [employer] - additional documentation required by insurance was sent on . Follow up on  indicated that order was cancelled, so new order was placed. [employer] representative stated should take ~5 days to process.    Reports eating 3 meals per day with some snacks - usually has rice and vegetables. Advised to have protein with each meal or snack to help sustain elevated sugar rather than drop down.      HPI  Diabetes Mellitus Type 2:  Diagnosed with diabetes:  >1 year. Known diabetic complications: retinopathy. Optometry exam current (within 1year):  No, none found in chart  Most recent visit in Podiatry was on none found in chart-- patient denies sores or cuts on feet today      Current diabetic medications include:  None at this time  On prednisone for Lupus (recent dose increase to help prevent hypoglycemic episodes)    Glucometer was NOT present at appointment. Patient tests SMBGS sometimes - no changes  Home blood glucose records (mg/dL)  FB-70s  Bedtime: 70s    Any episodes of hypoglycemia? Yes, with symptoms - into the 40s .  Did patient treat episode of hypoglycemia appropriately? Yes,    Does pt have proteinuria? N/A If appropriate, is patient on ACEi/ARB? N/A  Does pt have diabetes identification card/bracelet? No, denies If no, was card/education provided? No, telephonic visit - advised patient to obtain some form of ID to keep on person at last  visit    Secondary Prevention  Statin? Yes, atorvastatin 40mg daily .  ACE-I/ARB? No,   .  Aspirin? No,   .    Pertinent PMH Review:  PMH of Pancreatitis: No  PMH of Retinopathy: Yes  PMH of Urinary Tract Infections: No  PMH of MTC: No      MEDICATION ASSESSMENT  Allergies: Ace inhibitors, Hydroxychloroquine, Lisinopril, Penicillins, and Sulfa (sulfonamide antibiotics)     [id number] - [place], [place] - [address] [facility] [address]  [address]  [facility] [address]  Phone: [phone] Fax: [phone]    [facility] - [place], [place] - [address]  [address]  [address]  [facility] [address]  Phone: [phone] Fax: [phone]    LAB  Lab Results   Component Value Date    BILITOT 0.4 08/30/2023    CALCIUM 8.8 09/01/2023    CO2 23 09/01/2023     (H) 09/01/2023    CREATININE 1.85 (H) 09/01/2023    GLUCOSE 92 09/01/2023    ALKPHOS 111 08/30/2023    K 4.2 09/01/2023    PROT 6.5 08/30/2023     09/01/2023    AST 26 08/30/2023    ALT 18 08/30/2023    BUN 37 (H) 09/01/2023    ANIONGAP 12 09/01/2023    MG 2.36 08/30/2023    PHOS 3.6 09/01/2023    GGT 63 (H) 01/07/2021     04/30/2021    ALBUMIN 3.1 (L) 09/01/2023    LIPASE 30 01/24/2023     Lab Results   Component Value Date    TRIG 79 03/24/2023    CHOL 160 03/24/2023    HDL 70.9 03/24/2023     Lab Results   Component Value Date    HGBA1C 6.9 (A) 07/12/2023       DRUG INTERACTIONS  - No significant drug-drug interactions exist that require adjustment to therapy    CURRENT PHARMACOTHERAPY  - Eliquis 5 mg BID  - Trelegy 200-62.5-25 - 1 puff by mouth daily      Hyperglycemia  Patient identified self-management goal:  Minimize hypoglycemia  Patient's goal A1c is < 7%.  Is pt at goal? Yes, 6.9% in 7/2023  Patient's SMBGs are well below goal   Rationale for plan: A1c at goal, no SMBGS available. Continue without anti-diabetic medication. Will assess need after receiving CGM results.     Medication Changes: N/A      Monitoring and Education:  Rule of 15: eating ~15 g of carbs when BG less than 80 (half cup juice, 3-4 glucose tabs).  Always carry hard candy, small can of regular soda or juice, etc. In case an episode occurs.  Emphasized eating a protein with each snack         Patient stated she believes that she and her son will be able to figure out how to apply the Dexcom and will continue with telephonic appointments. Will follow up next Friday to check on status of order with [employer] and if received, was she able to apply the device.    Time spent with pt: Total length of time 10 (minutes) of the encounter and more than 50% was spent counseling the patient.    Clinical Pharmacist follow up: 9/29 @ 9:30am    Nesha Foley PharmD, JACQUELINE  Clinical Pharmacist  Pharmacy Services  [phone]    Continue all meds under the continuation of care with the referring provider and clinical pharmacy team.    Verbal consent to manage patient's drug therapy was obtained from patient. They were informed they may decline to participate or withdraw from participation in pharmacy services at any time.

## 2023-09-25 ENCOUNTER — SOCIAL WORK (OUTPATIENT)
Dept: PRIMARY CARE | Facility: CLINIC | Age: 63
End: 2023-09-25
Payer: MEDICARE

## 2023-09-25 ASSESSMENT — ANXIETY QUESTIONNAIRES
1. FEELING NERVOUS, ANXIOUS, OR ON EDGE: NOT AT ALL
2. NOT BEING ABLE TO STOP OR CONTROL WORRYING: SEVERAL DAYS
4. TROUBLE RELAXING: NOT AT ALL
GAD7 TOTAL SCORE: 3
IF YOU CHECKED OFF ANY PROBLEMS ON THIS QUESTIONNAIRE, HOW DIFFICULT HAVE THESE PROBLEMS MADE IT FOR YOU TO DO YOUR WORK, TAKE CARE OF THINGS AT HOME, OR GET ALONG WITH OTHER PEOPLE: NOT DIFFICULT AT ALL
5. BEING SO RESTLESS THAT IT IS HARD TO SIT STILL: NOT AT ALL
3. WORRYING TOO MUCH ABOUT DIFFERENT THINGS: SEVERAL DAYS
6. BECOMING EASILY ANNOYED OR IRRITABLE: NOT AT ALL
7. FEELING AFRAID AS IF SOMETHING AWFUL MIGHT HAPPEN: SEVERAL DAYS

## 2023-09-25 ASSESSMENT — PATIENT HEALTH QUESTIONNAIRE - PHQ9
6. FEELING BAD ABOUT YOURSELF - OR THAT YOU ARE A FAILURE OR HAVE LET YOURSELF OR YOUR FAMILY DOWN: SEVERAL DAYS
4. FEELING TIRED OR HAVING LITTLE ENERGY: NOT AT ALL
5. POOR APPETITE OR OVEREATING: NOT AT ALL
10. IF YOU CHECKED OFF ANY PROBLEMS, HOW DIFFICULT HAVE THESE PROBLEMS MADE IT FOR YOU TO DO YOUR WORK, TAKE CARE OF THINGS AT HOME, OR GET ALONG WITH OTHER PEOPLE: SOMEWHAT DIFFICULT
2. FEELING DOWN, DEPRESSED OR HOPELESS: NOT AT ALL
3. TROUBLE FALLING OR STAYING ASLEEP: SEVERAL DAYS
SUM OF ALL RESPONSES TO PHQ QUESTIONS 1-9: 3
8. MOVING OR SPEAKING SO SLOWLY THAT OTHER PEOPLE COULD HAVE NOTICED. OR THE OPPOSITE, BEING SO FIGETY OR RESTLESS THAT YOU HAVE BEEN MOVING AROUND A LOT MORE THAN USUAL: SEVERAL DAYS
1. LITTLE INTEREST OR PLEASURE IN DOING THINGS: NOT AT ALL
9. THOUGHTS THAT YOU WOULD BE BETTER OFF DEAD, OR OF HURTING YOURSELF: NOT AT ALL
SUM OF ALL RESPONSES TO PHQ9 QUESTIONS 1 & 2: 0
7. TROUBLE CONCENTRATING ON THINGS, SUCH AS READING THE NEWSPAPER OR WATCHING TELEVISION: NOT AT ALL
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
## 2023-09-29 ENCOUNTER — DOCUMENTATION (OUTPATIENT)
Dept: PRIMARY CARE | Facility: CLINIC | Age: 63
End: 2023-09-29

## 2023-09-29 ENCOUNTER — TELEMEDICINE (OUTPATIENT)
Dept: PHARMACY | Facility: HOSPITAL | Age: 63
End: 2023-09-29
Payer: MEDICARE

## 2023-09-29 DIAGNOSIS — E16.2 HYPOGLYCEMIA: ICD-10-CM

## 2023-09-29 DIAGNOSIS — R73.9 HYPERGLYCEMIA: ICD-10-CM

## 2023-09-29 DIAGNOSIS — Z79.899 MEDICATION MANAGEMENT: ICD-10-CM

## 2023-09-29 NOTE — ASSESSMENT & PLAN NOTE
"Patient has received Dexcom and was able to place. She reports having alarms for lows \"all the time\" but is glad they can wake her up to correct during sleep.    We discussed increasing protein intake with meals and correct hypoglycemia with fast sugars (hard candy) FIRST and then eating a snack or sandwich containing fats and proteins.     Will meet in person in  2 weeks to review Dexcom data and adjust alarms.   "

## 2023-09-29 NOTE — PROGRESS NOTES
Pharmacist Clinic: [facility] Discharge    Bing Holliday was referred to the Clinical Pharmacy Team for financial assistance with Eliquis and Trelegy; as well as hospital follow-up. Now being followed for CGM assistance and diabetes monitoring.    Referring Provider: Claudio Hood,     Interval history:  Dexcom kept going off during the night - wasn't getting any sleep  Put on Wednesday night   Alarms happening all day      HPI  Diabetes Mellitus Type 2:  Diagnosed with diabetes:  >1 year. Known diabetic complications: retinopathy. Optometry exam current (within 1year):  No, none found in chart  Most recent visit in Podiatry was on none found in chart-- patient denies sores or cuts on feet today      Current diabetic medications include:  None at this time  On prednisone for Lupus (recent dose increase to help prevent hypoglycemic episodes)    Glucometer was NOT present at appointment. Patient tests SMBGS sometimes - no changes  Home blood glucose records (mg/dL)  FB-70s  Bedtime: 70s    Any episodes of hypoglycemia? Yes, with symptoms - into the 40s .  Did patient treat episode of hypoglycemia appropriately? Yes,    Does pt have proteinuria? N/A If appropriate, is patient on ACEi/ARB? N/A  Does pt have diabetes identification card/bracelet? No, denies If no, was card/education provided? No, telephonic visit - advised patient to obtain some form of ID to keep on person at last visit    Secondary Prevention  Statin? Yes, atorvastatin 40mg daily .  ACE-I/ARB? No,   .  Aspirin? No,   .    Pertinent PMH Review:  PMH of Pancreatitis: No  PMH of Retinopathy: Yes  PMH of Urinary Tract Infections: No  PMH of MTC: No      MEDICATION ASSESSMENT  Allergies: Ace inhibitors, Hydroxychloroquine, Lisinopril, Penicillins, and Sulfa (sulfonamide antibiotics)     [id number] [place] [place] - [address] [facility] [address] [facility] [address]  [address] [facility] [address]  Phone: [phone] Fax: [phone]    [facility] - [place], [place] - [address]  [address]  [address]  [facility] [address]  Phone: [phone] Fax: [phone]    LAB  Lab Results   Component Value Date    BILITOT 0.4 08/30/2023    CALCIUM 8.8 09/01/2023    CO2 23 09/01/2023     (H) 09/01/2023    CREATININE 1.85 (H) 09/01/2023    GLUCOSE 92 09/01/2023    ALKPHOS 111 08/30/2023    K 4.2 09/01/2023    PROT 6.5 08/30/2023     09/01/2023    AST 26 08/30/2023    ALT 18 08/30/2023    BUN 37 (H) 09/01/2023    ANIONGAP 12 09/01/2023    MG 2.36 08/30/2023    PHOS 3.6 09/01/2023    GGT 63 (H) 01/07/2021     04/30/2021    ALBUMIN 3.1 (L) 09/01/2023    LIPASE 30 01/24/2023     Lab Results   Component Value Date    TRIG 79 03/24/2023    CHOL 160 03/24/2023    HDL 70.9 03/24/2023     Lab Results   Component Value Date    HGBA1C 6.9 (A) 07/12/2023       DRUG INTERACTIONS  - No significant drug-drug interactions exist that require adjustment to therapy    CURRENT PHARMACOTHERAPY  - Eliquis 5 mg BID  - Trelegy 200-62.5-25 - 1 puff by mouth daily      Hyperglycemia  Patient has received Dexcom and was able to place. She reports having alarms for lows \"all the time\" but is glad they can wake her up to correct during sleep.    We discussed increasing protein intake with meals and correct hypoglycemia with fast sugars (hard candy) FIRST and then eating a snack or sandwich containing fats and proteins.     Will meet in person in  2 weeks to review Dexcom data and adjust alarms.         Time spent with pt: Total length of time 15 (minutes) of the encounter and more than 50% was spent counseling the patient.    Clinical Pharmacist follow up: 9/29 @ 9:30am    Nesha Foley PharmD, JACQUELINE  Clinical Pharmacist  Pharmacy Services  [phone]    Continue all meds under the continuation of care with the referring provider and clinical pharmacy team.    Verbal consent to manage patient's drug therapy was obtained from patient. They " were informed they may decline to participate or withdraw from participation in pharmacy services at any time.

## 2023-10-02 ENCOUNTER — DOCUMENTATION (OUTPATIENT)
Dept: PRIMARY CARE | Facility: CLINIC | Age: 63
End: 2023-10-02
Payer: MEDICARE

## 2023-10-02 DIAGNOSIS — R44.3 HALLUCINATIONS: Primary | ICD-10-CM

## 2023-10-02 PROCEDURE — 99492 1ST PSYC COLLAB CARE MGMT: CPT | Performed by: FAMILY MEDICINE

## 2023-10-02 NOTE — PROGRESS NOTES
M spoke to patient 9/29 to explore if pt has been reached by Neurology department for evaluation.  Pt stated someone did call her but couldn't be seen until March 2024.  [facility] learned that Neuropsych has openings in October if PCP feels this would be an appropriate referral.  M alerted PCP of this wait time.  Marilee Watson, MSW, LSW

## 2023-10-04 ENCOUNTER — DOCUMENTATION (OUTPATIENT)
Dept: PRIMARY CARE | Facility: CLINIC | Age: 63
End: 2023-10-04
Payer: MEDICARE

## 2023-10-04 RX ORDER — RISPERIDONE 0.5 MG/1
0.5 TABLET ORAL NIGHTLY
Qty: 30 TABLET | Refills: 0 | OUTPATIENT
Start: 2023-10-04 | End: 2024-10-03

## 2023-10-04 RX ORDER — LOPERAMIDE HYDROCHLORIDE 2 MG/1
CAPSULE ORAL
Qty: 90 CAPSULE | Refills: 0 | OUTPATIENT
Start: 2023-10-04 | End: 2024-10-03

## 2023-10-06 ENCOUNTER — DOCUMENTATION (OUTPATIENT)
Dept: PRIMARY CARE | Facility: CLINIC | Age: 63
End: 2023-10-06
Payer: MEDICARE

## 2023-10-06 NOTE — PROGRESS NOTES
"[facility] spoke to patient on 10/6/23 re: recent order placed for psychiatry.  Explored pt's wishes for further COCM involvement until bridged to psychiatry.  Pt reported she feels she is doing well and agreeable to this plan.  Pt feels she is \"doing ok\" and has had not psychotic episodes since our last meeting.  Pt reported she would like to discontinue COCM services and schedule with psychiatry.  Pt aware she can call [facility] with any questions or issues.  "

## 2023-10-09 ENCOUNTER — APPOINTMENT (OUTPATIENT)
Dept: PRIMARY CARE | Facility: CLINIC | Age: 63
End: 2023-10-09
Payer: MEDICARE

## 2023-10-10 NOTE — PROGRESS NOTES
Psych consult discussion 10/4/23 re: status of referral.  M provided info to consulting psychiatrist about pt's wishes for medication and ongoing psychiatry instead of ongoing counseling.  Discussion about various referrals and waitlists discussed.  Consulting psychiatrist provided consultation on things to clarify with PCP moving forward.

## 2023-10-11 ENCOUNTER — CLINICAL SUPPORT (OUTPATIENT)
Dept: PRIMARY CARE | Facility: CLINIC | Age: 63
End: 2023-10-11
Payer: MEDICARE

## 2023-10-11 DIAGNOSIS — M25.50 ARTHRALGIA, UNSPECIFIED JOINT: ICD-10-CM

## 2023-10-11 DIAGNOSIS — E16.2 HYPOGLYCEMIA: Primary | ICD-10-CM

## 2023-10-11 DIAGNOSIS — E78.5 HYPERLIPIDEMIA, UNSPECIFIED HYPERLIPIDEMIA TYPE: ICD-10-CM

## 2023-10-11 DIAGNOSIS — Z79.899 MEDICATION MANAGEMENT: ICD-10-CM

## 2023-10-11 DIAGNOSIS — R73.9 HYPERGLYCEMIA: ICD-10-CM

## 2023-10-11 RX ORDER — ATORVASTATIN CALCIUM 40 MG/1
40 TABLET, FILM COATED ORAL DAILY
Qty: 90 TABLET | Refills: 3 | Status: ON HOLD | OUTPATIENT
Start: 2023-10-11

## 2023-10-11 RX ORDER — PREDNISONE 10 MG/1
10 TABLET ORAL 3 TIMES DAILY
Qty: 90 TABLET | Refills: 11 | Status: CANCELLED | OUTPATIENT
Start: 2023-10-11

## 2023-10-11 NOTE — ASSESSMENT & PLAN NOTE
Patient's dexcom reveals that she is in target range (70-180mg/dL) 94% of the time but still having lows 5% of the time with most occurring in the early morning.     Will trial 1 tablescpoon uncooked cornstarch in a glass of milk every night with dinner for the next few weeks and see if that helps to raise and maintain sugars in the morning hours.     In the future, may consider increasing prednisone or changing to a longer acting steroid and changing prednisone to as needed for hypoglycemic episodes.    No changes made to any medications today. Atorvastatin was re-ordered as there were no refills remaining.

## 2023-10-11 NOTE — PROGRESS NOTES
Pharmacist Clinic: [facility] and [facility] Discharge    Bing Holliday was referred to the Clinical Pharmacy Team for financial assistance with Eliquis and Trelegy; as well as hospital follow-up. Now being followed for CGM assistance and diabetes monitoring.    Referring Provider: Claudio Hood,     Interval history:    Dexcom kept going off during the night - wasn't getting any sleep      Breakfast: cereal  Lunch: leftovers  Dinner: vegetables + rice or potatoes + meat usually latter evening then goes to bed    When low: apple jacks, glucose tablets, PBJ, apple juice, orange juice - sugars go up then come back down    HPI  Diabetes Mellitus Type 2:  Diagnosed with diabetes:  >1 year. Known diabetic complications: retinopathy. Optometry exam current (within 1year):  No, none found in chart  Most recent visit in Podiatry was NONE-- patient denies sores or cuts on feet today      Current diabetic medications include:  None at this time  On prednisone for Lupus (recent dose increase to help prevent hypoglycemic episodes)    Glucometer was present at appointment. Patient tests SMBGS with Dexcom  Home blood glucose records (mg/dL)  DEXCOM G7    Sensor Capture   10 day capture rate = 100%   Average Blood Sugars  14 day average = 111   Time in target (14 days)  Very High >250 = 0%  High         181-250 = 1%  Target       = 94%  Low          54-69 = 4%  Very Low  <54 = 1%   Number of Low Events (14 days)  #  of times BG < 70mg/dL = more than 10     Describe any trends/patterns:    Patient stays in range most of the day. It appears that most lows are occurring early morning.      Any episodes of hypoglycemia? Yes, with symptoms - into the 40s .  Did patient treat episode of hypoglycemia appropriately? Yes, emphasized quick sugars  Does pt have proteinuria? N/A If appropriate, is patient on ACEi/ARB? N/A  Does pt have diabetes identification card/bracelet? No, denies If no, was card/education provided? No, telephonic  visit - advised patient to obtain some form of ID to keep on person at previous visit    Secondary Prevention  Statin? Yes, atorvastatin 40mg daily .  ACE-I/ARB? No,   .  Aspirin? No,   .    Pertinent PMH Review:  PMH of Pancreatitis: No  PMH of Retinopathy: Yes  PMH of Urinary Tract Infections: No  PMH of MTC: No      PLAN  Patient continues to experience lows throughout the day despite changes to diet.     Discussed with patient and son to try drinking rosemary corn starch - 1 TBSP in chocolate milk twice a day  Increase prednisone to 10mg three times daily    Noted that there is a previous order for endocrinology from PCP - encouraged patient to reach out to scheduling line to make this appointment ([phone]). They will better be able to evaluate labs she has previously had done and assess for solutions.    Medications needing refill: atorvastatin,   Ask about sending cellcept to [facility]?    Consider nutrition referral so they can help pickout the best carbs for you to have?      MEDICATION ASSESSMENT  Allergies: Ace inhibitors, Hydroxychloroquine, Lisinopril, Penicillins, and Sulfa (sulfonamide antibiotics)     [id number] [place], [place] - [address] [facility] AT [address]  [address] [address] [address]  Phone: [phone] Fax: [phone]    [facility] the [facility]  [address]  [facility] [address]  Phone: [phone] Fax: [phone]    LAB  Lab Results   Component Value Date    BILITOT 0.4 08/30/2023    CALCIUM 8.8 09/01/2023    CO2 23 09/01/2023     (H) 09/01/2023    CREATININE 1.85 (H) 09/01/2023    GLUCOSE 92 09/01/2023    ALKPHOS 111 08/30/2023    K 4.2 09/01/2023    PROT 6.5 08/30/2023     09/01/2023    AST 26 08/30/2023    ALT 18 08/30/2023    BUN 37 (H) 09/01/2023    ANIONGAP 12 09/01/2023    MG 2.36 08/30/2023    PHOS 3.6 09/01/2023    GGT 63 (H) 01/07/2021     04/30/2021    ALBUMIN 3.1 (L) 09/01/2023    LIPASE 30 01/24/2023     Lab Results    Component Value Date    TRIG 79 03/24/2023    CHOL 160 03/24/2023    HDL 70.9 03/24/2023     Lab Results   Component Value Date    HGBA1C 6.9 (A) 07/12/2023       DRUG INTERACTIONS  - No significant drug-drug interactions exist that require adjustment to therapy    CURRENT PHARMACOTHERAPY  - Eliquis 5 mg BID  - Trelegy 200-62.5-25 - 1 puff by mouth daily      Hyperglycemia  Patient's dexcom reveals that she is in target range (70-180mg/dL) 94% of the time but still having lows 5% of the time with most occurring in the early morning.     Will trial 1 tablescpoon uncooked cornstarch in a glass of milk every night with dinner for the next few weeks and see if that helps to raise and maintain sugars in the morning hours.     In the future, may consider increasing prednisone or changing to a longer acting steroid and changing prednisone to as needed for hypoglycemic episodes.    No changes made to any medications today. Atorvastatin was re-ordered as there were no refills remaining.       Patient was provided instructions with how to share readings through the Clarity shahzda so that I can see them remotely.    Time spent with pt: Total length of time 40 (minutes) of the encounter and more than 50% was spent counseling the patient.    Clinical Pharmacist follow up: 3 weeks via telephone    Nesha Foley PharmD, JACQUELINE  Clinical Pharmacist  Pharmacy Services  [phone]    Continue all meds under the continuation of care with the referring provider and clinical pharmacy team.    Verbal consent to manage patient's drug therapy was obtained from patient. They were informed they may decline to participate or withdraw from participation in pharmacy services at any time.

## 2023-10-11 NOTE — PATIENT INSTRUCTIONS
If you have any concerns or questions please call Nesha FREGOSO PharmD at [phone].   Your next appointment is scheduled for 2023 at 2pm via telephone    Instructions:  Continue to check blood sugar throughout the day with Dexcom  START drinking 1 tablespoon cornstarch in a glass of chocolate milk daily before bed.  Schedule with endocrinology - [phone]    Please download Dexcom Clarity shahzad and login with same account as Dexcom shahzad. Type in the code on the provided sheet to pair sugars with me so I can see them during our phone appointments.      Diabetes Education  Rule of 15: eating ~15 g of carbs when BG less than 80 (half cup juice, 3-4 glucose tabs).  Recognize symptoms of high and low blood sugar.   Eat a realistic healthy diet consisting of fruits, vegetables, fiber, protein food choices on a regular basis and be aware of portion/serving sizes. Reduce carbohydrate consumption and always consume with protein and fat. Avoid foods high in saturated/trans fat, high salt content, and sweets and beverages with added sugars.  Limit alcohol consumption; alcohol may affect your blood sugar and cause hypoglycemia.   Stay active and incorporate ~30 mins of exercise into your daily routine to manage your weight and increase the body's acceptance of insulin.    PATIENT GOALS   Fasting B - 130 mg/dL 2-HR Postprandial BG:   Less than 180 mg/dL A1c:   Less than 7.0 %

## 2023-10-12 ENCOUNTER — PATIENT OUTREACH (OUTPATIENT)
Dept: PRIMARY CARE | Facility: CLINIC | Age: 63
End: 2023-10-12
Payer: MEDICARE

## 2023-10-12 DIAGNOSIS — I77.89 LUPUS VASCULITIS (MULTI): ICD-10-CM

## 2023-10-12 DIAGNOSIS — R73.9 HYPERGLYCEMIA: ICD-10-CM

## 2023-10-12 DIAGNOSIS — D46.9 MYELODYSPLASTIC SYNDROME, UNSPECIFIED (MULTI): ICD-10-CM

## 2023-10-12 DIAGNOSIS — R44.3 HALLUCINATIONS: ICD-10-CM

## 2023-10-12 DIAGNOSIS — I48.91 ATRIAL FIBRILLATION, UNSPECIFIED TYPE (MULTI): ICD-10-CM

## 2023-10-12 DIAGNOSIS — N17.9 AKI (ACUTE KIDNEY INJURY) (CMS-HCC): ICD-10-CM

## 2023-10-12 DIAGNOSIS — I10 PRIMARY HYPERTENSION: ICD-10-CM

## 2023-10-12 DIAGNOSIS — E27.2 ADRENAL CRISIS (MULTI): ICD-10-CM

## 2023-10-12 DIAGNOSIS — E27.40: ICD-10-CM

## 2023-10-12 DIAGNOSIS — E66.09 CLASS 1 OBESITY DUE TO EXCESS CALORIES WITH SERIOUS COMORBIDITY AND BODY MASS INDEX (BMI) OF 32.0 TO 32.9 IN ADULT: ICD-10-CM

## 2023-10-12 DIAGNOSIS — Z11.4 SCREENING FOR HIV (HUMAN IMMUNODEFICIENCY VIRUS): ICD-10-CM

## 2023-10-12 DIAGNOSIS — D64.9 ANEMIA, UNSPECIFIED TYPE: ICD-10-CM

## 2023-10-12 DIAGNOSIS — M32.19 LUPUS VASCULITIS (MULTI): ICD-10-CM

## 2023-10-12 DIAGNOSIS — R26.2 AMBULATORY DYSFUNCTION: ICD-10-CM

## 2023-10-12 DIAGNOSIS — E11.42 DIABETIC POLYNEUROPATHY ASSOCIATED WITH TYPE 2 DIABETES MELLITUS (MULTI): ICD-10-CM

## 2023-10-12 DIAGNOSIS — Z11.1 VISIT FOR TB SKIN TEST: ICD-10-CM

## 2023-10-12 DIAGNOSIS — E11.65 UNCONTROLLED DIABETES MELLITUS WITH HYPERGLYCEMIA, WITHOUT LONG-TERM CURRENT USE OF INSULIN (MULTI): ICD-10-CM

## 2023-10-12 DIAGNOSIS — R41.3 MEMORY CHANGES: ICD-10-CM

## 2023-10-12 DIAGNOSIS — E16.2 HYPOGLYCEMIA: ICD-10-CM

## 2023-10-12 DIAGNOSIS — G93.40 ACUTE ENCEPHALOPATHY: ICD-10-CM

## 2023-10-12 DIAGNOSIS — R41.0 DELIRIUM: ICD-10-CM

## 2023-10-12 DIAGNOSIS — M32.9 LUPUS (MULTI): ICD-10-CM

## 2023-10-12 DIAGNOSIS — E11.3591: ICD-10-CM

## 2023-10-12 DIAGNOSIS — E27.40 ADRENAL INSUFFICIENCY (MULTI): ICD-10-CM

## 2023-10-12 PROCEDURE — 99490 CHRNC CARE MGMT STAFF 1ST 20: CPT | Performed by: FAMILY MEDICINE

## 2023-10-12 NOTE — PROGRESS NOTES
Call placed to patient. Was able to get patient into see Dr. Chi at [facility] on 11/15/2023 at 3 PM ([phone]; fax : [phone]). Patient states understanding and provided with address and phone number to office. While on phone she reports frustration with her sugars today as they remain unstable and she keeps dropping low. Patient states understanding to call if she continues to have issues.

## 2023-10-12 NOTE — PROGRESS NOTES
Chart review complete.     Monthly outreach complete.    Patient main concerns at this time:  - hypoglycemia in AM: Patient just was awoken because her sugar is low. Dexacom reading 66. Patient advised to intake quick sugars and recheck in 15 min. She plans to eat breakfast, [facility] let patient off phone to manage hypoglycemia and advised patient she'll call back around 10AM, patient states understanding. Call placed back to patient. Current BS is 106. Patient states she heated up mac and cheese, not a typical breakfast for her but was in fridge and easy. Patient states she is typically woken up during the night by the CGM due to low BS readings. Last night and Sunday night were the most recent occurences.  Last night and Sunday night waking up in 40's. When happens she takes glucose pill (twice on Sunday), and OJ last night. Educated on alternative quick sugars and interventions. Patient states understanding. Discussed endocrinology referral. Patient states they have called and could see her till March. Discussed with office and order replaced as STAT as patient has multiple hospital readmissions in the past due to this, comorbidities and glucose levels remain unstable.     Patient has labs ordered from Dr. Dinero that were to be complete 2 weeks post visit on 9/14. Reminded patient to get labs drawn, she states understanding. Due for an updated AWV - scheduled. Patient also had been working with S, Marilee Watson. This nurse and  have worked in collaboration and patient was to follow up with Renetta Villarreal @ [facility] for psychiatry. Per Ms Watson patient had previously seen Renetta Villarreal and wanted to continue with her in future. Patient states she plans to call and get an appointment set up.     Denies further needs/ concerns at this time. States understanding to call if any arise.

## 2023-10-14 ENCOUNTER — PHARMACY VISIT (OUTPATIENT)
Dept: PHARMACY | Facility: CLINIC | Age: 63
End: 2023-10-14
Payer: MEDICARE

## 2023-10-14 DIAGNOSIS — M25.50 ARTHRALGIA, UNSPECIFIED JOINT: ICD-10-CM

## 2023-10-14 PROCEDURE — RXMED WILLOW AMBULATORY MEDICATION CHARGE

## 2023-10-16 RX ORDER — PREDNISONE 5 MG/1
TABLET ORAL
Qty: 90 TABLET | Refills: 0 | Status: SHIPPED | OUTPATIENT
Start: 2023-10-16 | End: 2023-11-27 | Stop reason: HOSPADM

## 2023-10-17 DIAGNOSIS — M32.9 LUPUS (MULTI): Primary | ICD-10-CM

## 2023-10-17 RX ORDER — ACETAMINOPHEN 325 MG/1
650 TABLET ORAL ONCE
Status: CANCELLED | OUTPATIENT
Start: 2023-10-21

## 2023-10-17 RX ORDER — DIPHENHYDRAMINE HYDROCHLORIDE 50 MG/ML
50 INJECTION INTRAMUSCULAR; INTRAVENOUS AS NEEDED
OUTPATIENT
Start: 2023-10-21

## 2023-10-17 RX ORDER — DIPHENHYDRAMINE HCL 25 MG
25 CAPSULE ORAL ONCE
Status: CANCELLED | OUTPATIENT
Start: 2023-10-21

## 2023-10-17 RX ORDER — FAMOTIDINE 10 MG/ML
20 INJECTION INTRAVENOUS ONCE AS NEEDED
OUTPATIENT
Start: 2023-10-21

## 2023-10-17 RX ORDER — EPINEPHRINE 0.3 MG/.3ML
0.3 INJECTION SUBCUTANEOUS EVERY 5 MIN PRN
OUTPATIENT
Start: 2023-10-21

## 2023-10-17 RX ORDER — ALBUTEROL SULFATE 0.83 MG/ML
3 SOLUTION RESPIRATORY (INHALATION) AS NEEDED
OUTPATIENT
Start: 2023-10-21

## 2023-10-17 NOTE — PROGRESS NOTES
EPIC order conversion - continuation of benlysta 10mg/kg (900mg) IV infusions every 4 weeks at [facility] or 10/21/23.

## 2023-10-19 ENCOUNTER — APPOINTMENT (OUTPATIENT)
Dept: RADIOLOGY | Facility: HOSPITAL | Age: 63
DRG: 551 | End: 2023-10-19
Payer: MEDICARE

## 2023-10-19 ENCOUNTER — TELEPHONE (OUTPATIENT)
Dept: PRIMARY CARE | Facility: CLINIC | Age: 63
End: 2023-10-19

## 2023-10-19 ENCOUNTER — HOSPITAL ENCOUNTER (OUTPATIENT)
Facility: HOSPITAL | Age: 63
Setting detail: OBSERVATION
Discharge: NURSING, REHAB OR LONG-TERM CARE | DRG: 551 | End: 2023-10-25
Attending: EMERGENCY MEDICINE | Admitting: STUDENT IN AN ORGANIZED HEALTH CARE EDUCATION/TRAINING PROGRAM
Payer: MEDICARE

## 2023-10-19 DIAGNOSIS — L97.522 ULCER OF TOE OF LEFT FOOT, WITH FAT LAYER EXPOSED (MULTI): ICD-10-CM

## 2023-10-19 DIAGNOSIS — R51.9 ACUTE NONINTRACTABLE HEADACHE, UNSPECIFIED HEADACHE TYPE: ICD-10-CM

## 2023-10-19 DIAGNOSIS — R29.898 WEAKNESS OF BOTH LOWER EXTREMITIES: Primary | ICD-10-CM

## 2023-10-19 DIAGNOSIS — M79.89 LEG SWELLING: ICD-10-CM

## 2023-10-19 DIAGNOSIS — N39.0 UTI (URINARY TRACT INFECTION), UNCOMPLICATED: ICD-10-CM

## 2023-10-19 DIAGNOSIS — Z79.899 MEDICATION MANAGEMENT: ICD-10-CM

## 2023-10-19 DIAGNOSIS — I70.245 ATHEROSCLEROSIS OF NATIVE ARTERIES OF LEFT LEG WITH ULCERATION OF OTHER PART OF FOOT (MULTI): ICD-10-CM

## 2023-10-19 PROBLEM — R32 URINARY INCONTINENCE: Status: ACTIVE | Noted: 2023-10-19

## 2023-10-19 PROBLEM — R41.82 AMS (ALTERED MENTAL STATUS): Status: ACTIVE | Noted: 2023-10-19

## 2023-10-19 PROBLEM — R41.0 DISORIENTATION: Status: ACTIVE | Noted: 2023-10-19

## 2023-10-19 PROBLEM — N30.00 ACUTE CYSTITIS WITHOUT HEMATURIA: Status: ACTIVE | Noted: 2023-10-19

## 2023-10-19 LAB
ALBUMIN SERPL BCP-MCNC: 3.2 G/DL (ref 3.4–5)
ALP SERPL-CCNC: 98 U/L (ref 33–136)
ALT SERPL W P-5'-P-CCNC: 28 U/L (ref 7–45)
ANION GAP SERPL CALC-SCNC: 10 MMOL/L (ref 10–20)
APPEARANCE UR: ABNORMAL
APTT PPP: 55 SECONDS (ref 27–38)
AST SERPL W P-5'-P-CCNC: 37 U/L (ref 9–39)
BACTERIA #/AREA URNS AUTO: ABNORMAL /HPF
BASOPHILS # BLD MANUAL: 0.07 X10*3/UL (ref 0–0.1)
BASOPHILS NFR BLD MANUAL: 2 %
BILIRUB SERPL-MCNC: 0.2 MG/DL (ref 0–1.2)
BILIRUB UR STRIP.AUTO-MCNC: NEGATIVE MG/DL
BUN SERPL-MCNC: 55 MG/DL (ref 6–23)
BURR CELLS BLD QL SMEAR: ABNORMAL
CALCIUM SERPL-MCNC: 8.9 MG/DL (ref 8.6–10.3)
CARDIAC TROPONIN I PNL SERPL HS: 12 NG/L (ref 0–13)
CHLORIDE SERPL-SCNC: 113 MMOL/L (ref 98–107)
CK SERPL-CCNC: 75 U/L (ref 0–215)
CK SERPL-CCNC: 81 U/L (ref 0–215)
CO2 SERPL-SCNC: 23 MMOL/L (ref 21–32)
COLOR UR: YELLOW
CREAT SERPL-MCNC: 1.47 MG/DL (ref 0.5–1.05)
CRP SERPL-MCNC: 0.63 MG/DL
DACRYOCYTES BLD QL SMEAR: ABNORMAL
EOSINOPHIL # BLD MANUAL: 0.04 X10*3/UL (ref 0–0.7)
EOSINOPHIL NFR BLD MANUAL: 1 %
ERYTHROCYTE [DISTWIDTH] IN BLOOD BY AUTOMATED COUNT: 17.9 % (ref 11.5–14.5)
GFR SERPL CREATININE-BSD FRML MDRD: 40 ML/MIN/1.73M*2
GIANT PLATELETS BLD QL SMEAR: ABNORMAL
GLUCOSE BLD MANUAL STRIP-MCNC: 101 MG/DL (ref 74–99)
GLUCOSE SERPL-MCNC: 103 MG/DL (ref 74–99)
GLUCOSE UR STRIP.AUTO-MCNC: NEGATIVE MG/DL
HCT VFR BLD AUTO: 30.8 % (ref 36–46)
HGB BLD-MCNC: 9.2 G/DL (ref 12–16)
HOLD SPECIMEN: NORMAL
IMM GRANULOCYTES # BLD AUTO: 0 X10*3/UL (ref 0–0.7)
IMM GRANULOCYTES NFR BLD AUTO: 0 % (ref 0–0.9)
INR PPP: 1.3 (ref 0.9–1.1)
KETONES UR STRIP.AUTO-MCNC: NEGATIVE MG/DL
LEUKOCYTE ESTERASE UR QL STRIP.AUTO: ABNORMAL
LYMPHOCYTES # BLD MANUAL: 0.56 X10*3/UL (ref 1.2–4.8)
LYMPHOCYTES NFR BLD MANUAL: 15 %
MAGNESIUM SERPL-MCNC: 1.88 MG/DL (ref 1.6–2.4)
MCH RBC QN AUTO: 28.8 PG (ref 26–34)
MCHC RBC AUTO-ENTMCNC: 29.9 G/DL (ref 32–36)
MCV RBC AUTO: 96 FL (ref 80–100)
MONOCYTES # BLD MANUAL: 0.22 X10*3/UL (ref 0.1–1)
MONOCYTES NFR BLD MANUAL: 6 %
NEUTROPHILS # BLD MANUAL: 2.81 X10*3/UL (ref 1.2–7.7)
NEUTS BAND # BLD MANUAL: 0.07 X10*3/UL (ref 0–0.7)
NEUTS BAND NFR BLD MANUAL: 2 %
NEUTS SEG # BLD MANUAL: 2.74 X10*3/UL (ref 1.2–7)
NEUTS SEG NFR BLD MANUAL: 74 %
NITRITE UR QL STRIP.AUTO: NEGATIVE
NRBC BLD-RTO: 0 /100 WBCS (ref 0–0)
PH UR STRIP.AUTO: 6 [PH]
PLATELET # BLD AUTO: 100 X10*3/UL (ref 150–450)
PMV BLD AUTO: 13.5 FL (ref 7.5–11.5)
POTASSIUM SERPL-SCNC: 5.4 MMOL/L (ref 3.5–5.3)
PROT SERPL-MCNC: 6.2 G/DL (ref 6.4–8.2)
PROT UR STRIP.AUTO-MCNC: ABNORMAL MG/DL
PROTHROMBIN TIME: 14.9 SECONDS (ref 9.8–12.8)
RBC # BLD AUTO: 3.2 X10*6/UL (ref 4–5.2)
RBC # UR STRIP.AUTO: NEGATIVE /UL
RBC #/AREA URNS AUTO: ABNORMAL /HPF
RBC MORPH BLD: ABNORMAL
SCHISTOCYTES BLD QL SMEAR: ABNORMAL
SODIUM SERPL-SCNC: 141 MMOL/L (ref 136–145)
SP GR UR STRIP.AUTO: 1.01
T4 FREE SERPL-MCNC: 0.82 NG/DL (ref 0.61–1.12)
T4 FREE SERPL-MCNC: 0.86 NG/DL (ref 0.61–1.12)
TOTAL CELLS COUNTED BLD: 100
TSH SERPL-ACNC: 4.65 MIU/L (ref 0.44–3.98)
TSH SERPL-ACNC: 4.72 MIU/L (ref 0.44–3.98)
UROBILINOGEN UR STRIP.AUTO-MCNC: <2 MG/DL
WBC # BLD AUTO: 3.7 X10*3/UL (ref 4.4–11.3)
WBC #/AREA URNS AUTO: ABNORMAL /HPF

## 2023-10-19 PROCEDURE — 36415 COLL VENOUS BLD VENIPUNCTURE: CPT | Performed by: PHYSICIAN ASSISTANT

## 2023-10-19 PROCEDURE — 82550 ASSAY OF CK (CPK): CPT | Performed by: EMERGENCY MEDICINE

## 2023-10-19 PROCEDURE — 99285 EMERGENCY DEPT VISIT HI MDM: CPT | Performed by: EMERGENCY MEDICINE

## 2023-10-19 PROCEDURE — 72125 CT NECK SPINE W/O DYE: CPT | Mod: MG

## 2023-10-19 PROCEDURE — 70498 CT ANGIOGRAPHY NECK: CPT | Performed by: STUDENT IN AN ORGANIZED HEALTH CARE EDUCATION/TRAINING PROGRAM

## 2023-10-19 PROCEDURE — 82085 ASSAY OF ALDOLASE: CPT | Performed by: EMERGENCY MEDICINE

## 2023-10-19 PROCEDURE — 82553 CREATINE MB FRACTION: CPT | Mod: STJLAB | Performed by: NURSE PRACTITIONER

## 2023-10-19 PROCEDURE — 85730 THROMBOPLASTIN TIME PARTIAL: CPT | Performed by: PHYSICIAN ASSISTANT

## 2023-10-19 PROCEDURE — 84439 ASSAY OF FREE THYROXINE: CPT | Performed by: PHYSICIAN ASSISTANT

## 2023-10-19 PROCEDURE — 71045 X-RAY EXAM CHEST 1 VIEW: CPT | Performed by: RADIOLOGY

## 2023-10-19 PROCEDURE — 80053 COMPREHEN METABOLIC PANEL: CPT | Performed by: PHYSICIAN ASSISTANT

## 2023-10-19 PROCEDURE — 93010 ELECTROCARDIOGRAM REPORT: CPT | Performed by: INTERNAL MEDICINE

## 2023-10-19 PROCEDURE — 85610 PROTHROMBIN TIME: CPT | Performed by: PHYSICIAN ASSISTANT

## 2023-10-19 PROCEDURE — 81001 URINALYSIS AUTO W/SCOPE: CPT | Performed by: PHYSICIAN ASSISTANT

## 2023-10-19 PROCEDURE — 86160 COMPLEMENT ANTIGEN: CPT | Mod: STJLAB | Performed by: NURSE PRACTITIONER

## 2023-10-19 PROCEDURE — 70450 CT HEAD/BRAIN W/O DYE: CPT | Mod: MG

## 2023-10-19 PROCEDURE — 84443 ASSAY THYROID STIM HORMONE: CPT | Performed by: PHYSICIAN ASSISTANT

## 2023-10-19 PROCEDURE — 70498 CT ANGIOGRAPHY NECK: CPT | Mod: MG

## 2023-10-19 PROCEDURE — 83735 ASSAY OF MAGNESIUM: CPT | Performed by: PHYSICIAN ASSISTANT

## 2023-10-19 PROCEDURE — 99285 EMERGENCY DEPT VISIT HI MDM: CPT | Performed by: PHYSICIAN ASSISTANT

## 2023-10-19 PROCEDURE — 82553 CREATINE MB FRACTION: CPT | Performed by: NURSE PRACTITIONER

## 2023-10-19 PROCEDURE — 72131 CT LUMBAR SPINE W/O DYE: CPT | Performed by: RADIOLOGY

## 2023-10-19 PROCEDURE — 70450 CT HEAD/BRAIN W/O DYE: CPT | Performed by: RADIOLOGY

## 2023-10-19 PROCEDURE — 2550000001 HC RX 255 CONTRASTS: Performed by: EMERGENCY MEDICINE

## 2023-10-19 PROCEDURE — 72128 CT CHEST SPINE W/O DYE: CPT | Mod: MG

## 2023-10-19 PROCEDURE — 99222 1ST HOSP IP/OBS MODERATE 55: CPT | Performed by: PSYCHIATRY & NEUROLOGY

## 2023-10-19 PROCEDURE — 99223 1ST HOSP IP/OBS HIGH 75: CPT | Performed by: INTERNAL MEDICINE

## 2023-10-19 PROCEDURE — 2500000004 HC RX 250 GENERAL PHARMACY W/ HCPCS (ALT 636 FOR OP/ED): Performed by: PHYSICIAN ASSISTANT

## 2023-10-19 PROCEDURE — G0378 HOSPITAL OBSERVATION PER HR: HCPCS

## 2023-10-19 PROCEDURE — 72148 MRI LUMBAR SPINE W/O DYE: CPT | Performed by: RADIOLOGY

## 2023-10-19 PROCEDURE — 84443 ASSAY THYROID STIM HORMONE: CPT | Performed by: NURSE PRACTITIONER

## 2023-10-19 PROCEDURE — 71045 X-RAY EXAM CHEST 1 VIEW: CPT | Mod: FY

## 2023-10-19 PROCEDURE — 84439 ASSAY OF FREE THYROXINE: CPT | Performed by: NURSE PRACTITIONER

## 2023-10-19 PROCEDURE — 87086 URINE CULTURE/COLONY COUNT: CPT | Mod: STJLAB | Performed by: PHYSICIAN ASSISTANT

## 2023-10-19 PROCEDURE — 82085 ASSAY OF ALDOLASE: CPT | Performed by: NURSE PRACTITIONER

## 2023-10-19 PROCEDURE — 86140 C-REACTIVE PROTEIN: CPT | Performed by: NURSE PRACTITIONER

## 2023-10-19 PROCEDURE — 86160 COMPLEMENT ANTIGEN: CPT | Mod: CMCLAB | Performed by: NURSE PRACTITIONER

## 2023-10-19 PROCEDURE — 72148 MRI LUMBAR SPINE W/O DYE: CPT | Mod: MG

## 2023-10-19 PROCEDURE — 72128 CT CHEST SPINE W/O DYE: CPT | Performed by: RADIOLOGY

## 2023-10-19 PROCEDURE — 87086 URINE CULTURE/COLONY COUNT: CPT | Mod: CMCLAB | Performed by: PHYSICIAN ASSISTANT

## 2023-10-19 PROCEDURE — 70496 CT ANGIOGRAPHY HEAD: CPT | Performed by: STUDENT IN AN ORGANIZED HEALTH CARE EDUCATION/TRAINING PROGRAM

## 2023-10-19 PROCEDURE — 84484 ASSAY OF TROPONIN QUANT: CPT | Performed by: PHYSICIAN ASSISTANT

## 2023-10-19 PROCEDURE — 82947 ASSAY GLUCOSE BLOOD QUANT: CPT

## 2023-10-19 PROCEDURE — 85027 COMPLETE CBC AUTOMATED: CPT | Mod: 59 | Performed by: PHYSICIAN ASSISTANT

## 2023-10-19 PROCEDURE — 85007 BL SMEAR W/DIFF WBC COUNT: CPT | Performed by: PHYSICIAN ASSISTANT

## 2023-10-19 PROCEDURE — 72125 CT NECK SPINE W/O DYE: CPT | Performed by: RADIOLOGY

## 2023-10-19 PROCEDURE — 72131 CT LUMBAR SPINE W/O DYE: CPT | Mod: MG

## 2023-10-19 PROCEDURE — 36415 COLL VENOUS BLD VENIPUNCTURE: CPT | Performed by: NURSE PRACTITIONER

## 2023-10-19 PROCEDURE — 82550 ASSAY OF CK (CPK): CPT | Performed by: NURSE PRACTITIONER

## 2023-10-19 PROCEDURE — 70496 CT ANGIOGRAPHY HEAD: CPT | Mod: MG

## 2023-10-19 RX ORDER — ONDANSETRON HYDROCHLORIDE 2 MG/ML
4 INJECTION, SOLUTION INTRAVENOUS EVERY 8 HOURS PRN
Status: DISCONTINUED | OUTPATIENT
Start: 2023-10-19 | End: 2023-10-25 | Stop reason: HOSPADM

## 2023-10-19 RX ORDER — ACETAMINOPHEN 325 MG/1
650 TABLET ORAL EVERY 4 HOURS PRN
Status: DISCONTINUED | OUTPATIENT
Start: 2023-10-19 | End: 2023-10-21

## 2023-10-19 RX ORDER — PANTOPRAZOLE SODIUM 40 MG/10ML
40 INJECTION, POWDER, LYOPHILIZED, FOR SOLUTION INTRAVENOUS
Status: DISCONTINUED | OUTPATIENT
Start: 2023-10-20 | End: 2023-10-25 | Stop reason: HOSPADM

## 2023-10-19 RX ORDER — ONDANSETRON 4 MG/1
4 TABLET, ORALLY DISINTEGRATING ORAL EVERY 8 HOURS PRN
Status: DISCONTINUED | OUTPATIENT
Start: 2023-10-19 | End: 2023-10-25 | Stop reason: HOSPADM

## 2023-10-19 RX ORDER — ACETAMINOPHEN 650 MG/1
650 SUPPOSITORY RECTAL EVERY 4 HOURS PRN
Status: DISCONTINUED | OUTPATIENT
Start: 2023-10-19 | End: 2023-10-21

## 2023-10-19 RX ORDER — POLYETHYLENE GLYCOL 3350 17 G/17G
17 POWDER, FOR SOLUTION ORAL DAILY PRN
Status: DISCONTINUED | OUTPATIENT
Start: 2023-10-19 | End: 2023-10-25 | Stop reason: HOSPADM

## 2023-10-19 RX ORDER — ACETAMINOPHEN 325 MG/1
650 TABLET ORAL ONCE
Status: COMPLETED | OUTPATIENT
Start: 2023-10-19 | End: 2023-10-19

## 2023-10-19 RX ORDER — TALC
3 POWDER (GRAM) TOPICAL NIGHTLY PRN
Status: DISCONTINUED | OUTPATIENT
Start: 2023-10-19 | End: 2023-10-25 | Stop reason: HOSPADM

## 2023-10-19 RX ORDER — SODIUM CHLORIDE 9 MG/ML
100 INJECTION, SOLUTION INTRAVENOUS CONTINUOUS
Status: DISCONTINUED | OUTPATIENT
Start: 2023-10-19 | End: 2023-10-25

## 2023-10-19 RX ORDER — METOCLOPRAMIDE HYDROCHLORIDE 5 MG/ML
10 INJECTION INTRAMUSCULAR; INTRAVENOUS EVERY 6 HOURS PRN
Status: DISCONTINUED | OUTPATIENT
Start: 2023-10-19 | End: 2023-10-25 | Stop reason: HOSPADM

## 2023-10-19 RX ORDER — METOCLOPRAMIDE 10 MG/1
10 TABLET ORAL EVERY 6 HOURS PRN
Status: DISCONTINUED | OUTPATIENT
Start: 2023-10-19 | End: 2023-10-25 | Stop reason: HOSPADM

## 2023-10-19 RX ORDER — ACETAMINOPHEN 160 MG/5ML
650 SOLUTION ORAL EVERY 4 HOURS PRN
Status: DISCONTINUED | OUTPATIENT
Start: 2023-10-19 | End: 2023-10-21

## 2023-10-19 RX ORDER — PANTOPRAZOLE SODIUM 40 MG/1
40 TABLET, DELAYED RELEASE ORAL
Status: DISCONTINUED | OUTPATIENT
Start: 2023-10-20 | End: 2023-10-25 | Stop reason: HOSPADM

## 2023-10-19 RX ORDER — CEFTRIAXONE 1 G/50ML
1 INJECTION, SOLUTION INTRAVENOUS ONCE
Status: COMPLETED | OUTPATIENT
Start: 2023-10-19 | End: 2023-10-19

## 2023-10-19 RX ADMIN — IOHEXOL 50 ML: 350 INJECTION, SOLUTION INTRAVENOUS at 14:59

## 2023-10-19 RX ADMIN — CEFTRIAXONE SODIUM 1 G: 1 INJECTION, SOLUTION INTRAVENOUS at 19:54

## 2023-10-19 RX ADMIN — ACETAMINOPHEN 650 MG: 325 TABLET ORAL at 15:59

## 2023-10-19 SDOH — SOCIAL STABILITY: SOCIAL INSECURITY: ARE YOU OR HAVE YOU BEEN THREATENED OR ABUSED PHYSICALLY, EMOTIONALLY, OR SEXUALLY BY ANYONE?: NO

## 2023-10-19 SDOH — SOCIAL STABILITY: SOCIAL INSECURITY: POSSIBLE ABUSE REPORTED TO:: OTHER (COMMENT)

## 2023-10-19 SDOH — SOCIAL STABILITY: SOCIAL INSECURITY: DO YOU FEEL UNSAFE GOING BACK TO THE PLACE WHERE YOU ARE LIVING?: NO

## 2023-10-19 SDOH — SOCIAL STABILITY: SOCIAL INSECURITY: HAVE YOU HAD THOUGHTS OF HARMING ANYONE ELSE?: NO

## 2023-10-19 SDOH — SOCIAL STABILITY: SOCIAL INSECURITY: HAS ANYONE EVER THREATENED TO HURT YOUR FAMILY OR YOUR PETS?: NO

## 2023-10-19 SDOH — SOCIAL STABILITY: SOCIAL INSECURITY: DOES ANYONE TRY TO KEEP YOU FROM HAVING/CONTACTING OTHER FRIENDS OR DOING THINGS OUTSIDE YOUR HOME?: NO

## 2023-10-19 SDOH — HEALTH STABILITY: MENTAL HEALTH: EXPERIENCED ANY OF THE FOLLOWING LIFE EVENTS: OTHER (COMMENT)

## 2023-10-19 SDOH — SOCIAL STABILITY: SOCIAL INSECURITY: ARE THERE ANY APPARENT SIGNS OF INJURIES/BEHAVIORS THAT COULD BE RELATED TO ABUSE/NEGLECT?: NO

## 2023-10-19 SDOH — SOCIAL STABILITY: SOCIAL INSECURITY: DO YOU FEEL ANYONE HAS EXPLOITED OR TAKEN ADVANTAGE OF YOU FINANCIALLY OR OF YOUR PERSONAL PROPERTY?: NO

## 2023-10-19 SDOH — SOCIAL STABILITY: SOCIAL INSECURITY: ABUSE: ADULT

## 2023-10-19 SDOH — SOCIAL STABILITY: SOCIAL INSECURITY: WERE YOU ABLE TO COMPLETE ALL THE BEHAVIORAL HEALTH SCREENINGS?: YES

## 2023-10-19 ASSESSMENT — COGNITIVE AND FUNCTIONAL STATUS - GENERAL
PATIENT BASELINE BEDBOUND: NO
WALKING IN HOSPITAL ROOM: TOTAL
MOBILITY SCORE: 14
CLIMB 3 TO 5 STEPS WITH RAILING: TOTAL
HELP NEEDED FOR BATHING: A LITTLE
DRESSING REGULAR LOWER BODY CLOTHING: A LITTLE
TOILETING: A LITTLE
MOVING TO AND FROM BED TO CHAIR: A LOT
DAILY ACTIVITIY SCORE: 19
PERSONAL GROOMING: A LITTLE
STANDING UP FROM CHAIR USING ARMS: A LOT
DRESSING REGULAR UPPER BODY CLOTHING: A LITTLE

## 2023-10-19 ASSESSMENT — LIFESTYLE VARIABLES
AUDIT-C TOTAL SCORE: 0
SKIP TO QUESTIONS 9-10: 1
HOW OFTEN DO YOU HAVE A DRINK CONTAINING ALCOHOL: NEVER
SUBSTANCE_ABUSE_PAST_12_MONTHS: NO
AUDIT-C TOTAL SCORE: 0
HOW OFTEN DO YOU HAVE 6 OR MORE DRINKS ON ONE OCCASION: NEVER
HOW MANY STANDARD DRINKS CONTAINING ALCOHOL DO YOU HAVE ON A TYPICAL DAY: PATIENT DOES NOT DRINK
HAVE YOU EVER FELT YOU SHOULD CUT DOWN ON YOUR DRINKING: NO
REASON UNABLE TO ASSESS: NO
EVER HAD A DRINK FIRST THING IN THE MORNING TO STEADY YOUR NERVES TO GET RID OF A HANGOVER: NO
EVER FELT BAD OR GUILTY ABOUT YOUR DRINKING: NO
PRESCIPTION_ABUSE_PAST_12_MONTHS: NO
HAVE PEOPLE ANNOYED YOU BY CRITICIZING YOUR DRINKING: NO

## 2023-10-19 ASSESSMENT — PAIN SCALES - GENERAL
PAINLEVEL_OUTOF10: 0 - NO PAIN
PAINLEVEL_OUTOF10: 8
PAINLEVEL_OUTOF10: 4
PAINLEVEL_OUTOF10: 0 - NO PAIN
PAINLEVEL_OUTOF10: 8
PAINLEVEL_OUTOF10: 6
PAINLEVEL_OUTOF10: 4
PAINLEVEL_OUTOF10: 0 - NO PAIN

## 2023-10-19 ASSESSMENT — ACTIVITIES OF DAILY LIVING (ADL)
JUDGMENT_ADEQUATE_SAFELY_COMPLETE_DAILY_ACTIVITIES: YES
HEARING - RIGHT EAR: FUNCTIONAL
GROOMING: NEEDS ASSISTANCE
FEEDING YOURSELF: INDEPENDENT
HEARING - LEFT EAR: FUNCTIONAL
LACK_OF_TRANSPORTATION: NO
BATHING: NEEDS ASSISTANCE
DRESSING YOURSELF: NEEDS ASSISTANCE
TOILETING: NEEDS ASSISTANCE
LACK_OF_TRANSPORTATION: NO
WALKS IN HOME: NEEDS ASSISTANCE
ASSISTIVE_DEVICE: EYEGLASSES;OTHER (COMMENT)
ADEQUATE_TO_COMPLETE_ADL: YES
PATIENT'S MEMORY ADEQUATE TO SAFELY COMPLETE DAILY ACTIVITIES?: YES
LACK_OF_TRANSPORTATION: NO

## 2023-10-19 ASSESSMENT — PAIN - FUNCTIONAL ASSESSMENT
PAIN_FUNCTIONAL_ASSESSMENT: 0-10
PAIN_FUNCTIONAL_ASSESSMENT: 0-10

## 2023-10-19 ASSESSMENT — PAIN DESCRIPTION - FREQUENCY: FREQUENCY: CONSTANT/CONTINUOUS

## 2023-10-19 ASSESSMENT — PAIN DESCRIPTION - PROGRESSION: CLINICAL_PROGRESSION: NOT CHANGED

## 2023-10-19 ASSESSMENT — PAIN DESCRIPTION - ORIENTATION: ORIENTATION: ANTERIOR;RIGHT;LEFT

## 2023-10-19 ASSESSMENT — COLUMBIA-SUICIDE SEVERITY RATING SCALE - C-SSRS
2. HAVE YOU ACTUALLY HAD ANY THOUGHTS OF KILLING YOURSELF?: NO
6. HAVE YOU EVER DONE ANYTHING, STARTED TO DO ANYTHING, OR PREPARED TO DO ANYTHING TO END YOUR LIFE?: NO
1. IN THE PAST MONTH, HAVE YOU WISHED YOU WERE DEAD OR WISHED YOU COULD GO TO SLEEP AND NOT WAKE UP?: NO

## 2023-10-19 ASSESSMENT — PAIN DESCRIPTION - DESCRIPTORS: DESCRIPTORS: SHARP

## 2023-10-19 ASSESSMENT — PATIENT HEALTH QUESTIONNAIRE - PHQ9
2. FEELING DOWN, DEPRESSED OR HOPELESS: NOT AT ALL
SUM OF ALL RESPONSES TO PHQ9 QUESTIONS 1 & 2: 0
1. LITTLE INTEREST OR PLEASURE IN DOING THINGS: NOT AT ALL

## 2023-10-19 ASSESSMENT — PAIN DESCRIPTION - PAIN TYPE: TYPE: ACUTE PAIN

## 2023-10-19 ASSESSMENT — PAIN DESCRIPTION - ONSET: ONSET: SUDDEN

## 2023-10-19 ASSESSMENT — PAIN DESCRIPTION - LOCATION: LOCATION: HEAD

## 2023-10-19 NOTE — CONSULTS
History Of Present Illness  Bing Holliday is a 62 y.o. female presenting with weakness and headaches.    The patient states that she was sitting [place] and all of a sudden, developed a headache.  She describes a bifrontal pressure sensation.  Shortly later, she developed pain in her legs.  Specifically, she describes joint pain mainly in her hips.  As time progressed, she felt like her legs were getting weaker.  She cannot tell me if the weakness was due to pain or not.  She denies any weakness/numbness in her arms.  She denies any numbness in her legs.  She has chronic urinary incontinence and wears depends.  She denies any neck pain, mid back pain, or low back pain.  She denies any double vision, slurred speech, facial drop, or loss of peripheral vision.    She normally uses a walker and does note chronic lower extremity weakness.    Past Medical History  Past Medical History:   Diagnosis Date    Acute upper respiratory infection, unspecified 03/04/2020    Acute URI    Acute upper respiratory infection, unspecified 09/30/2015    URTI (acute upper respiratory infection)    Arthritis     Body mass index (BMI) 23.0-23.9, adult 10/15/2021    BMI 23.0-23.9, adult    Body mass index (BMI) 33.0-33.9, adult 03/04/2020    BMI 33.0-33.9,adult    Cardiomegaly 08/27/2013    Left ventricular hypertrophy    Chronic kidney disease, stage 3 unspecified (CMS/HCC) 07/02/2013    Chronic kidney disease, stage III (moderate)    Disease of pericardium, unspecified 07/02/2013    Pericardial disease    Encounter for follow-up examination after completed treatment for conditions other than malignant neoplasm 10/06/2022    Hospital discharge follow-up    Generalized contraction of visual field, right eye 01/29/2015    Generalized contraction of visual field of right eye    Homonymous bilateral field defects, right side 04/29/2016    Homonymous bilateral field defects of right side    Hypertensive chronic kidney disease with stage  1 through stage 4 chronic kidney disease, or unspecified chronic kidney disease 07/02/2013    Nephrosclerosis    Laceration without foreign body, left foot, initial encounter 07/03/2018    Foot laceration, left, initial encounter    Migraine with aura, not intractable, without status migrainosus 10/24/2022    Ocular migraine    Other conditions influencing health status 07/02/2013    Chronic Glomerulonephritis In Diseases Classified Elsewhere    Other conditions influencing health status 07/02/2013    Progressive Familial Myoclonic Epilepsy    Other conditions influencing health status 07/02/2013    Protein S Deficiency    Other conditions influencing health status 05/22/2015    Familial Combined Hyperlipidemia    Other conditions influencing health status 10/24/2022    IDDM (insulin dependent diabetes mellitus)    Other conditions influencing health status 03/14/2022    Diabetes mellitus, insulin dependent (IDDM), uncontrolled    Other long term (current) drug therapy 10/24/2022    Long-term use of Plaquenil    Personal history of diseases of the blood and blood-forming organs and certain disorders involving the immune mechanism 07/02/2013    History of thrombocytopenia    Personal history of diseases of the skin and subcutaneous tissue 08/11/2015    History of foot ulcer    Personal history of nephrotic syndrome 07/02/2013    History of nephrotic syndrome    Personal history of other diseases of the circulatory system 08/27/2013    History of sinus tachycardia    Personal history of other diseases of the nervous system and sense organs     History of cataract    Personal history of other diseases of the respiratory system     History of bronchitis    Personal history of other infectious and parasitic diseases 07/02/2013    History of hepatitis    Personal history of other specified conditions     History of shortness of breath    Personal history of other specified conditions 08/27/2013    History of edema     Puckering of macula, right eye 10/24/2022    ERM OD (epiretinal membrane, right eye)    Raynaud's syndrome without gangrene 07/02/2013    Raynaud's disease    Systemic lupus erythematosus, unspecified (CMS/HCC) 07/24/2015    SLE (systemic lupus erythematosus)    Systemic lupus erythematosus, unspecified (CMS/HCC) 07/24/2015    SLE (systemic lupus erythematosus)    Systemic lupus erythematosus, unspecified (CMS/HCC) 07/24/2015    Systemic lupus    Type 2 diabetes mellitus with diabetic nephropathy (CMS/HCC) 07/02/2013    Type 2 diabetes with nephropathy    Type 2 diabetes mellitus with mild nonproliferative diabetic retinopathy without macular edema, left eye (CMS/HCC) 07/27/2015    Non-proliferative diabetic retinopathy, left eye    Type 2 diabetes mellitus with mild nonproliferative diabetic retinopathy without macular edema, unspecified eye (CMS/HCC) 07/24/2015    Mild non proliferative diabetic retinopathy    Type 2 diabetes mellitus with proliferative diabetic retinopathy without macular edema, right eye (CMS/HCC) 07/27/2015    Proliferative diabetic retinopathy of right eye    Type 2 diabetes mellitus with proliferative diabetic retinopathy without macular edema, unspecified eye (CMS/HCC) 07/24/2015    Diabetic proliferative retinopathy    Unspecified acute and subacute iridocyclitis 07/24/2015    Acute iritis, right eye    Unspecified open wound, left foot, sequela 07/03/2018    Wound, open, foot, left, sequela     Surgical History  Past Surgical History:   Procedure Laterality Date    ANKLE SURGERY  01/29/2015    Ankle Surgery    CHOLECYSTECTOMY  01/29/2015    Cholecystectomy    CT GUIDED PERCUTANEOUS BIOPSY BONE DEEP  5/4/2021    CT GUIDED PERCUTANEOUS BIOPSY BONE DEEP 5/4/2021 [facility]    EYE SURGERY  03/06/2015    Eye Surgery    FOOT SURGERY  01/29/2015    Foot Surgery    MR HEAD ANGIO WO IV CONTRAST  7/26/2013    MR HEAD ANGIO WO IV CONTRAST 7/26/2013 [facility]    MR HEAD ANGIO WO  "IV CONTRAST  9/17/2021    MR HEAD ANGIO WO IV CONTRAST 9/17/2021 [facility]    MR HEAD ANGIO WO IV CONTRAST  3/25/2023    MR HEAD ANGIO WO IV CONTRAST [facility]    MR NECK ANGIO WO IV CONTRAST  7/26/2013    MR NECK ANGIO WO IV CONTRAST 7/26/2013 [facility]GAC[facility]    MR NECK ANGIO WO IV CONTRAST  9/17/2021    MR NECK ANGIO WO IV CONTRAST 9/17/2021 [facility]    MR NECK ANGIO WO IV CONTRAST  3/25/2023    MR NECK ANGIO WO IV CONTRAST [facility]    OTHER SURGICAL HISTORY  01/29/2015    Creation Of Pericardial Window    OTHER SURGICAL HISTORY  01/29/2015    Quadricepsplasty    TOTAL HIP ARTHROPLASTY  01/29/2015    Hip Replacement     Social History  Social History     Tobacco Use    Smoking status: Never    Smokeless tobacco: Never   Vaping Use    Vaping Use: Never used   Substance Use Topics    Alcohol use: Never    Drug use: Never     Allergies  Ace inhibitors, Hydroxychloroquine, Lisinopril, Penicillins, and Sulfa (sulfonamide antibiotics)  (Not in a hospital admission)      Review of Systems  Neurological Exam  Physical Exam  Last Recorded Vitals  Blood pressure 140/71, pulse 64, temperature 36.6 °C (97.9 °F), temperature source Temporal, resp. rate 15, height 1.803 m (5' 11\"), weight 99.4 kg (219 lb 2.2 oz), SpO2 95 %.    Gen: NAD  Neuro:  --HIF: A&O X 3, repetition and naming intact  --CN:  PERRLA, EOMI, VFF, no visible facial asymmetry, facial sensation intact, no tongue or palatal deviation, SCM intact  --Motor:       UE:  5/5 except distally where contractures are noted b/l      LE:  hip flexion 3-, knee flexion 3, knee extension 3, DF 5. PF 5  --Sensory: Intact to light touch,   --Reflex: absent symmetric, toes down  --Cerebellum: FTN intact  --Gait: Deferred       Relevant Results        NIH Stroke Scale  1A. Level of Consciousness: Alert, Keenly Responsive  1B. Ask Month and Age: Both Questions Right  1C. Blink Eyes & Squeeze Hands: Performs Both Tasks  2. Best Gaze: Normal  3. Visual: No " Visual Loss  4. Facial Palsy: Normal Symmetrical Movements  5A. Motor - Left Arm: No Drift  5B. Motor - Right Arm: No Drift  6A. Motor - Left Leg: Some Effort Against Gravity  6B. Motor - Right Leg: Some Effort Against Gravity  7. Limb Ataxia: Absent  8. Sensory Loss: Normal  9. Best Language: No Aphasia  10. Dysarthria: Normal  11. Extinction and Inattention: No Abnormality  NIH Stroke Scale: 4           Nasim Coma Scale  Best Eye Response: Spontaneous  Best Verbal Response: Confused  Best Motor Response: Follows commands  Nasim Coma Scale Score: 14                 I have personally reviewed the following imaging results XR chest 1 view    Result Date: 10/19/2023  Interpreted By:  Nic Moseley, STUDY: XR CHEST 1 VIEW  10/19/2023 3:11 pm   INDICATION: Signs/Symptoms:bat weakness   COMPARISON: 08/27/2023   ACCESSION NUMBER(S): [id number]   ORDERING CLINICIAN: JADA BURRELL   TECHNIQUE: A single AP portable radiograph of the chest was obtained.   FINDINGS: Multiple cardiac monitoring leads are seen over the chest.   No focal infiltrate, pleural effusion or pneumothorax is identified. The cardiac silhouette is prominent, similar to prior studies.       No focal infiltrate or pneumothorax is identified.   MACRO: None.   Signed by: Nic Moseley 10/19/2023 3:13 PM Dictation workstation:   [id number]    CT angio brain attack head w and wo IV contrast and post procedure    Result Date: 10/19/2023  Interpreted By:  Jairo Moreno, STUDY: CT ANGIO BRAIN ATTACK HEAD W AND WO IV CONTRAST AND POST PROCEDURE; CT ANGIO BRAIN ATTACK NECK W IV CONTRAST AND POST PROCEDURE; 10/19/2023 2:57 pm   INDICATION: Signs/Symptoms:hemorrhagic stroke alert; Signs/Symptoms:bat.   COMPARISON: Head CT, same day   ACCESSION NUMBER(S): [id number]; [id number]   ORDERING CLINICIAN: JADA BURRELL   TECHNIQUE: 50 mL Omnipaque 350 was administered intravenously and axial images of the head and neck were acquired. Coronal and sagittal  MIPs and 3-D reconstructions were created on an independent workstation and provided for review. Image quality is significantly degraded by poor bolus timing.   FINDINGS: CTA Head:   There is minimal arterial phase enhancement, no proximal large vessel occlusion in the [id number]-en-[id number].   CTA Neck:   There is minimal arterial phase enhancement, no high-grade narrowing or occlusion of the major cervical arteries.   The soft tissues of the neck are unremarkable. The visualized lungs are clear. Dental caries with associated periapical lucency in the left maxilla. Degenerative changes in the spine and partially visualized glenohumeral joints.       Limited exam due to poor bolus timing, no large vessel occlusion in the head or neck.   MACRO: None   Signed by: aJiro Moreno 10/19/2023 3:08 PM Dictation workstation:   [id number]    CT angio brain attack neck w IV contrast and post procedure    Result Date: 10/19/2023  Interpreted By:  Jairo Moreno, STUDY: CT ANGIO BRAIN ATTACK HEAD W AND WO IV CONTRAST AND POST PROCEDURE; CT ANGIO BRAIN ATTACK NECK W IV CONTRAST AND POST PROCEDURE; 10/19/2023 2:57 pm   INDICATION: Signs/Symptoms:hemorrhagic stroke alert; Signs/Symptoms:bat.   COMPARISON: Head CT, same day   ACCESSION NUMBER(S): [id number]; [id number]   ORDERING CLINICIAN: JADA BURRELL   TECHNIQUE: 50 mL Omnipaque 350 was administered intravenously and axial images of the head and neck were acquired. Coronal and sagittal MIPs and 3-D reconstructions were created on an independent workstation and provided for review. Image quality is significantly degraded by poor bolus timing.   FINDINGS: CTA Head:   There is minimal arterial phase enhancement, no proximal large vessel occlusion in the [id number].   CTA Neck:   There is minimal arterial phase enhancement, no high-grade narrowing or occlusion of the major cervical arteries.   The soft tissues of the neck are unremarkable. The visualized lungs are clear.  Dental caries with associated periapical lucency in the left maxilla. Degenerative changes in the spine and partially visualized glenohumeral joints.       Limited exam due to poor bolus timing, no large vessel occlusion in the head or neck.   MACRO: None   Signed by: Jaior Moreno 10/19/2023 3:08 PM Dictation workstation:   [id number]    CT brain attack head wo IV contrast    Result Date: 10/19/2023  Interpreted By:  Gaston Beyer, STUDY: CT BRAIN ATTACK HEAD WO IV CONTRAST;  10/19/2023 2:38 pm   INDICATION: Signs/Symptoms:Stroke Evaluation.   COMPARISON: 08/28/2023   ACCESSION NUMBER(S): [id number]   ORDERING CLINICIAN: JADA BURRELL   TECHNIQUE: Sequential trans axial images were obtained  .   FINDINGS: INTRACRANIAL:   CORTICAL SULCI AND EXTRA-AXIAL SPACES:  Unremarkable.   VENTRICULAR SYSTEM:  Unremarkable without significant dilatation.   CEREBRAL PARENCHYMA:  There is mild hypodensity at the long tracks of the white matter, particularly in the frontoparietal regions greater on the left similar to the prior exam most likely due to gliosis from arteriolar disease.There is also a small area of encephalomalacia/gliosis at the left occipital lobe most likely from a remote infarction, also similar to the prior exam. Otherwisethere is no evidence of definite subacute infarction, intracranial hemorrhage or mass.   EXTRACRANIAL: Visualized paranasal sinuses and mastoids are clear. The calvarium is intact.       Mild age related degenerative change as described without acute findings or significant change from the prior exam. No intracranial hemorrhage.   MACRO: Gaston Beyer discussed the significance and urgency of this critical finding by secure chat with  JADA BURRELL on 10/19/2023 at 2:42 pm.  (**-RCF-**) Findings:  See findings.     Signed by: Gaston Beyer 10/19/2023 2:44 PM Dictation workstation:   [id number]      CT Brain (I personally reviewed the images/tracings with the following interpretation)  No  acute changes     Assessment/Plan   This is a 62 year old female presenting for evaluation of lower extremity weakness and headaches.  Symptoms began today - she was sitting on her island when she developed a bifrontal pressure sensation.  She later on developed lower extremity pain (mainly in her hips) and weakness in her legs.  She does have chronic lower extremity weakness and uses a walker.  On exam, she is noted to have proximal LE weakness.  She has contractures of her distal upper extremities.  Aside from this, there are no focal neurological deficits.     Lower Extremity Weakness  - ddx includes spinal stenosis/hemorhage, myopathy, or lupus flare up  - recommend spinal imaging (CT C, T, and L spine ordered)  - recommend labs: B12, TSH, ESR, CRP, CK, Aldolase, C3, and C4    2.  Headache  - her presentation began with a headache which has now resolved  - imaging reviewed and was unremarkable  - observe for now    Kong Reardon MD

## 2023-10-19 NOTE — ED TRIAGE NOTES
Pt arrived 1415 assessment fast completed 1415. Pt @ CT 1417. Pt CT completed  1440. Brain attack called 1420. Pt tolerated CT without incident.

## 2023-10-19 NOTE — ED PROVIDER NOTES
HPI   Chief Complaint   Patient presents with    Headache     Pt brought to the ER due to strong/ sharp headache which started 90 min ago ( 1330).  Pt alert and oriented X 2 person and place disoriented to year          Patient is a 62-year-old female who presented by squad for evaluation of sudden onset headache 1 hour ago, frontal, also endorses generalized weakness and pain in both of her legs.  No falls or head traumas, endorses weakness in her legs, no aphasia or facial asymmetry, no chest pain, shortness of breath.  Patient activated as a brain attack given sudden onset headache 1 hour ago, she is on Eliquis.  Van negative, NIHSS 6.    Patient unreliable historian upon initial presentations, after multiple reevaluations I was finally able to get a little bit more of a history from the patient, she is a 62-year-old female lives with her son and normally ambulates with a walker, she states that an hour prior to arrival she had a sudden onset frontal headache, it was made better with the Tylenol that we gave her here, she was notified that her work-up here was unremarkable.  She is not having any burning frequency urgency with urination, she has not had any falls, denies any neck or back pain, denies any abdominal pain chest pain or shortness of breath.                            Arley Coma Scale Score: 14         NIH Stroke Scale: 2          Patient History   Past Medical History:   Diagnosis Date    Acute upper respiratory infection, unspecified 03/04/2020    Acute URI    Acute upper respiratory infection, unspecified 09/30/2015    URTI (acute upper respiratory infection)    Arthritis     Body mass index (BMI) 23.0-23.9, adult 10/15/2021    BMI 23.0-23.9, adult    Body mass index (BMI) 33.0-33.9, adult 03/04/2020    BMI 33.0-33.9,adult    Cardiomegaly 08/27/2013    Left ventricular hypertrophy    Chronic kidney disease, stage 3 unspecified (CMS/HCC) 07/02/2013    Chronic kidney disease, stage III (moderate)     Disease of pericardium, unspecified 07/02/2013    Pericardial disease    Encounter for follow-up examination after completed treatment for conditions other than malignant neoplasm 10/06/2022    Hospital discharge follow-up    Generalized contraction of visual field, right eye 01/29/2015    Generalized contraction of visual field of right eye    Homonymous bilateral field defects, right side 04/29/2016    Homonymous bilateral field defects of right side    Hypertensive chronic kidney disease with stage 1 through stage 4 chronic kidney disease, or unspecified chronic kidney disease 07/02/2013    Nephrosclerosis    Laceration without foreign body, left foot, initial encounter 07/03/2018    Foot laceration, left, initial encounter    Migraine with aura, not intractable, without status migrainosus 10/24/2022    Ocular migraine    Other conditions influencing health status 07/02/2013    Chronic Glomerulonephritis In Diseases Classified Elsewhere    Other conditions influencing health status 07/02/2013    Progressive Familial Myoclonic Epilepsy    Other conditions influencing health status 07/02/2013    Protein S Deficiency    Other conditions influencing health status 05/22/2015    Familial Combined Hyperlipidemia    Other conditions influencing health status 10/24/2022    IDDM (insulin dependent diabetes mellitus)    Other conditions influencing health status 03/14/2022    Diabetes mellitus, insulin dependent (IDDM), uncontrolled    Other long term (current) drug therapy 10/24/2022    Long-term use of Plaquenil    Personal history of diseases of the blood and blood-forming organs and certain disorders involving the immune mechanism 07/02/2013    History of thrombocytopenia    Personal history of diseases of the skin and subcutaneous tissue 08/11/2015    History of foot ulcer    Personal history of nephrotic syndrome 07/02/2013    History of nephrotic syndrome    Personal history of other diseases of the circulatory  system 08/27/2013    History of sinus tachycardia    Personal history of other diseases of the nervous system and sense organs     History of cataract    Personal history of other diseases of the respiratory system     History of bronchitis    Personal history of other infectious and parasitic diseases 07/02/2013    History of hepatitis    Personal history of other specified conditions     History of shortness of breath    Personal history of other specified conditions 08/27/2013    History of edema    Puckering of macula, right eye 10/24/2022    ERM OD (epiretinal membrane, right eye)    Raynaud's syndrome without gangrene 07/02/2013    Raynaud's disease    Systemic lupus erythematosus, unspecified (CMS/HCC) 07/24/2015    SLE (systemic lupus erythematosus)    Systemic lupus erythematosus, unspecified (CMS/HCC) 07/24/2015    SLE (systemic lupus erythematosus)    Systemic lupus erythematosus, unspecified (CMS/HCC) 07/24/2015    Systemic lupus    Type 2 diabetes mellitus with diabetic nephropathy (CMS/HCC) 07/02/2013    Type 2 diabetes with nephropathy    Type 2 diabetes mellitus with mild nonproliferative diabetic retinopathy without macular edema, left eye (CMS/HCC) 07/27/2015    Non-proliferative diabetic retinopathy, left eye    Type 2 diabetes mellitus with mild nonproliferative diabetic retinopathy without macular edema, unspecified eye (CMS/HCC) 07/24/2015    Mild non proliferative diabetic retinopathy    Type 2 diabetes mellitus with proliferative diabetic retinopathy without macular edema, right eye (CMS/HCC) 07/27/2015    Proliferative diabetic retinopathy of right eye    Type 2 diabetes mellitus with proliferative diabetic retinopathy without macular edema, unspecified eye (CMS/HCC) 07/24/2015    Diabetic proliferative retinopathy    Unspecified acute and subacute iridocyclitis 07/24/2015    Acute iritis, right eye    Unspecified open wound, left foot, sequela 07/03/2018    Wound, open, foot, left, sequela      Past Surgical History:   Procedure Laterality Date    ANKLE SURGERY  01/29/2015    Ankle Surgery    CHOLECYSTECTOMY  01/29/2015    Cholecystectomy    CT GUIDED PERCUTANEOUS BIOPSY BONE DEEP  5/4/2021    CT GUIDED PERCUTANEOUS BIOPSY BONE DEEP 5/4/2021 [facility] CLINICAL LEGAC[facility]    EYE SURGERY  03/06/2015    Eye Surgery    FOOT SURGERY  01/29/2015    Foot Surgery    MR HEAD ANGIO WO IV CONTRAST  7/26/2013    MR HEAD ANGIO WO IV CONTRAST 7/26/2013 [facility] CLINICAL LEGACY    MR HEAD ANGIO WO IV CONTRAST  9/17/2021    MR HEAD ANGIO WO IV CONTRAST 9/17/2021 [facility]    MR HEAD ANGIO WO IV CONTRAST  3/25/2023    MR HEAD ANGIO WO IV CONTRAST [facility]    MR NECK ANGIO WO IV CONTRAST  7/26/2013    MR NECK ANGIO WO IV CONTRAST 7/26/2013 [facility] CLINICAL LEGACY    MR NECK ANGIO WO IV CONTRAST  9/17/2021    MR NECK ANGIO WO IV CONTRAST 9/17/2021 [facility]    MR NECK ANGIO WO IV CONTRAST  3/25/2023    MR NECK ANGIO WO IV CONTRAST [facility]    OTHER SURGICAL HISTORY  01/29/2015    Creation Of Pericardial Window    OTHER SURGICAL HISTORY  01/29/2015    Quadricepsplasty    TOTAL HIP ARTHROPLASTY  01/29/2015    Hip Replacement     No family history on file.  Social History     Tobacco Use    Smoking status: Never    Smokeless tobacco: Never   Vaping Use    Vaping Use: Never used   Substance Use Topics    Alcohol use: Never    Drug use: Never       Physical Exam   ED Triage Vitals [10/19/23 1420]   Temp Heart Rate Resp BP   36.6 °C (97.9 °F) 66 18 143/82      SpO2 Temp Source Heart Rate Source Patient Position   90 % Temporal Monitor Lying      BP Location FiO2 (%)     Right arm --       Physical Exam  Vitals and nursing note reviewed.   Constitutional:       General: She is not in acute distress.     Appearance: Normal appearance. She is not toxic-appearing.      Comments: Initially responsive oriented x 2, to self and place but not year (thinks 2021)  On re-evaluation A&Ox3 and much more responsive   HENT:      Head:  Normocephalic and atraumatic.      Nose: Nose normal.   Eyes:      Extraocular Movements: Extraocular movements intact.      Pupils: Pupils are equal, round, and reactive to light.   Cardiovascular:      Rate and Rhythm: Normal rate and regular rhythm.   Pulmonary:      Effort: Pulmonary effort is normal.      Breath sounds: Normal breath sounds.   Abdominal:      Palpations: Abdomen is soft.      Tenderness: There is no abdominal tenderness.   Musculoskeletal:         General: Normal range of motion.      Cervical back: Normal range of motion and neck supple.      Comments: No cervical thoracic or lumbar ttp   Skin:     General: Skin is warm and dry.   Neurological:      General: No focal deficit present.      Mental Status: She is alert.      Cranial Nerves: No cranial nerve deficit, dysarthria or facial asymmetry.      Motor: Weakness (BLEs) present.      Comments: NIHSS 6 initially  VAN negative   Psychiatric:         Mood and Affect: Mood normal.         Thought Content: Thought content normal.       Labs Reviewed   CBC WITH AUTO DIFFERENTIAL - Abnormal       Result Value    WBC 3.7 (*)     nRBC 0.0      RBC 3.20 (*)     Hemoglobin 9.2 (*)     Hematocrit 30.8 (*)     MCV 96      MCH 28.8      MCHC 29.9 (*)     RDW 17.9 (*)     Platelets 100 (*)     MPV 13.5 (*)     Immature Granulocytes %, Automated 0.0      Immature Granulocytes Absolute, Automated 0.00      Narrative:     The previously reported component Neutrophils % is no longer being reported.  The previously reported component Lymphocytes % is no longer being reported.  The previously reported component Monocytes % is no longer being reported.  The previously   reported component Eosinophils % is no longer being reported.  The previously reported component Basophils % is no longer being reported.  The previously reported component Absolute Neutrophils is no longer being reported.  The previously reported   component Absolute Lymphocytes is no longer  being reported.  The previously reported component Absolute Monocytes is no longer being reported.  The previously reported component Absolute Eosinophils is no longer being reported.  The previously reported   component Absolute Basophils is no longer being reported.   COMPREHENSIVE METABOLIC PANEL - Abnormal    Glucose 103 (*)     Sodium 141      Potassium 5.4 (*)     Chloride 113 (*)     Bicarbonate 23      Anion Gap 10      Urea Nitrogen 55 (*)     Creatinine 1.47 (*)     eGFR 40 (*)     Calcium 8.9      Albumin 3.2 (*)     Alkaline Phosphatase 98      Total Protein 6.2 (*)     AST 37      Bilirubin, Total 0.2      ALT 28     PROTIME-INR - Abnormal    Protime 14.9 (*)     INR 1.3 (*)    APTT - Abnormal    aPTT 55 (*)     Narrative:     The APTT is no longer used for monitoring Unfractionated Heparin Therapy. For monitoring Heparin Therapy, use the Heparin Assay.   TSH WITH REFLEX TO FREE T4 IF ABNORMAL - Abnormal    Thyroid Stimulating Hormone 4.72 (*)     Narrative:     TSH testing is performed using different testing methodology at [facility] than at other system [facility]. Direct result comparisons should only be made within the same method.     URINALYSIS WITH REFLEX MICROSCOPIC AND CULTURE - Abnormal    Color, Urine Yellow      Appearance, Urine Hazy (*)     Specific Gravity, Urine 1.006      pH, Urine 6.0      Protein, Urine 30 (1+) (*)     Glucose, Urine NEGATIVE      Blood, Urine NEGATIVE      Ketones, Urine NEGATIVE      Bilirubin, Urine NEGATIVE      Urobilinogen, Urine <2.0      Nitrite, Urine NEGATIVE      Leukocyte Esterase, Urine LARGE (3+) (*)    TSH WITH REFLEX TO FREE T4 IF ABNORMAL - Abnormal    Thyroid Stimulating Hormone 4.65 (*)     Narrative:     TSH testing is performed using different testing methodology at [facility] than at other system hospitals. Direct result comparisons should only be made within the same method.     MICROSCOPIC ONLY, URINE - Abnormal     WBC, Urine 21-50 (*)     RBC, Urine 3-5      Bacteria, Urine 2+ (*)    POCT GLUCOSE - Abnormal    POCT Glucose 101 (*)    MANUAL DIFFERENTIAL - Abnormal    Neutrophils %, Manual 74.0      Bands %, Manual 2.0      Lymphocytes %, Manual 15.0      Monocytes %, Manual 6.0      Eosinophils %, Manual 1.0      Basophils %, Manual 2.0      Seg Neutrophils Absolute, Manual 2.74      Bands Absolute, Manual 0.07      Lymphocytes Absolute, Manual 0.56 (*)     Monocytes Absolute, Manual 0.22      Eosinophils Absolute, Manual 0.04      Basophils Absolute, Manual 0.07      Total Cells Counted 100      Neutrophils Absolute, Manual 2.81      RBC Morphology See Below      RBC Fragments Few      Teardrop Cells Few      Katarzyna Cells Few      Giant Platelets Few     TROPONIN I, HIGH SENSITIVITY - Normal    Troponin I, High Sensitivity 12      Narrative:     Less than 99th percentile of normal range cutoff-  Female and children under 18 years old <14 ng/L; Male <21 ng/L: Negative  Repeat testing should be performed if clinically indicated.     Female and children under 18 years old 14-50 ng/L; Male 21-50 ng/L:  Consistent with possible cardiac damage and possible increased clinical   risk. Serial measurements may help to assess extent of myocardial damage.     >50 ng/L: Consistent with cardiac damage, increased clinical risk and  myocardial infarction. Serial measurements may help assess extent of   myocardial damage.      NOTE: Children less than 1 year old may have higher baseline troponin   levels and results should be interpreted in conjunction with the overall   clinical context.     NOTE: Troponin I testing is performed using a different   testing methodology at [facility] than at other   [facility]. Direct result comparisons should only   be made within the same method.   MAGNESIUM - Normal    Magnesium 1.88     CREATINE KINASE - Normal    Creatine Kinase 75     THYROXINE, FREE - Normal    Thyroxine, Free 0.82       Narrative:     Thyroxine Free testing is performed using different testing methodology at [facility] than at other [facility]. Direct result comparisons should only be made within the same method.    Biotin can cause falsely elevated free T4 results. Patients taking a Biotin dose of up to 10 mg/day should refrain from taking Biotin for 24 hours before sample collection. Patient taking a Biotin dose of >10 mg/day should consult with their physician or the laboratory before the blood draw.   C-REACTIVE PROTEIN - Normal    C-Reactive Protein 0.63     CREATINE KINASE - Normal    Creatine Kinase 81     THYROXINE, FREE - Normal    Thyroxine, Free 0.86      Narrative:     Thyroxine Free testing is performed using different testing methodology at [facility] than at other [facility]. Direct result comparisons should only be made within the same method.    Biotin can cause falsely elevated free T4 results. Patients taking a Biotin dose of up to 10 mg/day should refrain from taking Biotin for 24 hours before sample collection. Patient taking a Biotin dose of >10 mg/day should consult with their physician or the laboratory before the blood draw.   URINE CULTURE   URINALYSIS WITH REFLEX MICROSCOPIC AND CULTURE    Narrative:     The following orders were created for panel order Urinalysis with Reflex Microscopic and Culture.  Procedure                               Abnormality         Status                     ---------                               -----------         ------                     Urinalysis with Reflex M...[[id number]]  Abnormal            Final result               Extra Urine Gray Tube[[id number]]                            Final result                 Please view results for these tests on the individual orders.   EXTRA URINE GRAY TUBE    Extra Tube Hold for add-ons.     C3 COMPLEMENT   C4 COMPLEMENT   CKMB PROFILE    Narrative:     The following orders were created for  panel order CK total and CKMB.  Procedure                               Abnormality         Status                     ---------                               -----------         ------                     Creatine Kinase[[id number]]              Normal              Final result               Creatine Kinase, MB[[id number]]                              In process                   Please view results for these tests on the individual orders.   CREATINE KINASE MB   ALDOLASE   POCT GLUCOSE METER     CT cervical spine wo IV contrast   Final Result   No acute osseous abnormality in the cervical spine. Multilevel   degenerative change and neural foraminal narrowing.        Moderate age-indeterminate compression fracture T7 vertebral body.        No acute osseous abnormality in the lumbar spine. Multilevel severe   canal stenosis as well as neural foraminal narrowing.        Signed by: Mariia Concepcion 10/19/2023 6:06 PM   Dictation workstation:   [id number]      CT thoracic spine wo IV contrast   Final Result   No acute osseous abnormality in the cervical spine. Multilevel   degenerative change and neural foraminal narrowing.        Moderate age-indeterminate compression fracture T7 vertebral body.        No acute osseous abnormality in the lumbar spine. Multilevel severe   canal stenosis as well as neural foraminal narrowing.        Signed by: Mariia Concepcion 10/19/2023 6:06 PM   Dictation workstation:   [id number]      CT lumbar spine wo IV contrast   Final Result   No acute osseous abnormality in the cervical spine. Multilevel   degenerative change and neural foraminal narrowing.        Moderate age-indeterminate compression fracture T7 vertebral body.        No acute osseous abnormality in the lumbar spine. Multilevel severe   canal stenosis as well as neural foraminal narrowing.        Signed by: Mariia Concepcion 10/19/2023 6:06 PM   Dictation workstation:   [id number]      XR chest 1 view   Final Result   No  focal infiltrate or pneumothorax is identified.        MACRO:   None.        Signed by: Nic Moseley 10/19/2023 3:13 PM   Dictation workstation:   [id number]      CT angio brain attack head w and wo IV contrast and post procedure   Final Result   Limited exam due to poor bolus timing, no large vessel occlusion in   the head or neck.        MACRO:   None        Signed by: Jairo Moreno 10/19/2023 3:08 PM   Dictation workstation:   [id number]      CT angio brain attack neck w IV contrast and post procedure   Final Result   Limited exam due to poor bolus timing, no large vessel occlusion in   the head or neck.        MACRO:   None        Signed by: Jairo Moreno 10/19/2023 3:08 PM   Dictation workstation:   [id number]      CT brain attack head wo IV contrast   Final Result   Mild age related degenerative change as described without acute   findings or significant change from the prior exam. No intracranial   hemorrhage.        MACRO:   Gaston Beyer discussed the significance and urgency of this critical   finding by secure chat with  JADA VALENZUELA on 10/19/2023 at 2:42   pm.  (**-RCF-**) Findings:  See findings.             Signed by: Gaston Beyer 10/19/2023 2:44 PM   Dictation workstation:   [id number]      MR lumbar spine wo IV contrast    (Results Pending)         ED Course & MDM   ED Course as of 10/19/23 2059   Thu Oct 19, 2023   1423 NIHSS 6 (bilat lower extremity weakness, orientation, level of consciousness) [MK]   1446 VAN negative determined when patient first seen [MK]   9628 Had spoken to neurology Dr Reardon about this patient after resolution of scans, recommendations for C T and L spine Cts were made. [MK]   1925 CT thoracic spine wo IV contrast  PT without ANY ttp to spine, unlikely to be acute t7 fracture [MK]      ED Course User Index  [] Jada Valenzuela PA-C         Diagnoses as of 10/19/23 2059   Weakness of both lower extremities   Acute nonintractable headache, unspecified headache  type   UTI (urinary tract infection), uncomplicated       Medical Decision Making    MDM: Patient is a 62-year-old female who presented here with a sudden onset headache and bilateral lower extremity weakness that was sudden in onset, she is on Eliquis, brain attack was called for this reason given last known well 1 hour ago, CTs were negative, we did also initiated generalized weakness work-up, her TSH is elevated at 4.65, normal free T4, CBC without leukocytosis, hemoglobin 9.2, CRP 0.63 which was ordered by neurology, INR 1.3, APTT 55, I discussed case with neurology attending, Dr. Reardon who recommended imaging of the cervical thoracic and lumbar spine given the lower extremity weakness, there were some incidental findings including an age-indeterminate T7 compression fracture, I reexamined the patient, she had absolutely no tenderness anywhere along the spine.  She is not complaining of any back pain but I was questioning patient about incontinence, she does states that she normally has nighttime incontinence and wears depends but today she been having to wear depends.  She does still have bilateral lower extremity weakness on reexamination but is now alert and oriented x3, now knows the year, is much more alert and keenly responsive at this point.  I did discuss case with Dr. Bajwa, hospitalist, her urinalysis did ultimately come back positive for leukocyte Estrace white blood cells and bacteria and I feel that this is likely UTI related however cauda equina cannot be ruled out, we did decide that MRI of the lumbar spine can be ordered to definitively rule this out although with no back pain, feel that this is less likely and more likely related to a UTI which likely is the cause of her generalized weakness.  Her headache did improve with Tylenol, she lives at home with her son and normally ambulates with a walker and is not able to care for herself with the lower extremity weakness and will be admitted for  further evaluation and management.  She was started on 1 g of Rocephin for her UTI.        Procedure  Procedures     Linda Valenzuela PA-C  10/19/23 2059

## 2023-10-19 NOTE — ED NOTES
Patient has a baseline of weakness in the right and left lower extremities.      Anabel Soriano RN  10/19/23 [id number]

## 2023-10-19 NOTE — TELEPHONE ENCOUNTER
PATIENT IS CALLING - SHE IS CONCERNED BECAUSE AROUND 12:35 PM - SHE HAD A SUDDEN ONSET HEADACHE - NO OTHER SYMPTOMS - JUST WANTED YOU TO BE AWARE AND SEE WHAT YOU WOULD SUGGEST?  PLEASE ADVISE.

## 2023-10-20 ENCOUNTER — APPOINTMENT (OUTPATIENT)
Dept: RADIOLOGY | Facility: HOSPITAL | Age: 63
DRG: 551 | End: 2023-10-20
Payer: MEDICARE

## 2023-10-20 ENCOUNTER — APPOINTMENT (OUTPATIENT)
Dept: CARDIOLOGY | Facility: HOSPITAL | Age: 63
DRG: 551 | End: 2023-10-20
Payer: MEDICARE

## 2023-10-20 PROBLEM — R29.898 WEAKNESS OF BOTH LOWER EXTREMITIES: Status: ACTIVE | Noted: 2023-10-20

## 2023-10-20 LAB
ALBUMIN SERPL BCP-MCNC: 3.1 G/DL (ref 3.4–5)
ALP SERPL-CCNC: 105 U/L (ref 33–136)
ALT SERPL W P-5'-P-CCNC: 26 U/L (ref 7–45)
ANION GAP SERPL CALC-SCNC: 12 MMOL/L (ref 10–20)
AST SERPL W P-5'-P-CCNC: 32 U/L (ref 9–39)
ATRIAL RATE: 57 BPM
BILIRUB SERPL-MCNC: 0.2 MG/DL (ref 0–1.2)
BUN SERPL-MCNC: 56 MG/DL (ref 6–23)
C3 SERPL-MCNC: 140 MG/DL (ref 87–200)
C4 SERPL-MCNC: 54 MG/DL (ref 10–50)
CALCIUM SERPL-MCNC: 8.6 MG/DL (ref 8.6–10.3)
CHLORIDE SERPL-SCNC: 113 MMOL/L (ref 98–107)
CO2 SERPL-SCNC: 23 MMOL/L (ref 21–32)
CREAT SERPL-MCNC: 1.52 MG/DL (ref 0.5–1.05)
ERYTHROCYTE [DISTWIDTH] IN BLOOD BY AUTOMATED COUNT: 17.8 % (ref 11.5–14.5)
GFR SERPL CREATININE-BSD FRML MDRD: 39 ML/MIN/1.73M*2
GLUCOSE BLD MANUAL STRIP-MCNC: 130 MG/DL (ref 74–99)
GLUCOSE BLD MANUAL STRIP-MCNC: 46 MG/DL (ref 74–99)
GLUCOSE BLD MANUAL STRIP-MCNC: 48 MG/DL (ref 74–99)
GLUCOSE BLD MANUAL STRIP-MCNC: 67 MG/DL (ref 74–99)
GLUCOSE BLD MANUAL STRIP-MCNC: 74 MG/DL (ref 74–99)
GLUCOSE BLD MANUAL STRIP-MCNC: 79 MG/DL (ref 74–99)
GLUCOSE SERPL-MCNC: 61 MG/DL (ref 74–99)
HCT VFR BLD AUTO: 28.3 % (ref 36–46)
HGB BLD-MCNC: 8.5 G/DL (ref 12–16)
MCH RBC QN AUTO: 28.5 PG (ref 26–34)
MCHC RBC AUTO-ENTMCNC: 30 G/DL (ref 32–36)
MCV RBC AUTO: 95 FL (ref 80–100)
NRBC BLD-RTO: 0 /100 WBCS (ref 0–0)
P AXIS: 56 DEGREES
P OFFSET: 185 MS
P ONSET: 125 MS
PLATELET # BLD AUTO: 100 X10*3/UL (ref 150–450)
PMV BLD AUTO: ABNORMAL FL
POTASSIUM SERPL-SCNC: 5.1 MMOL/L (ref 3.5–5.3)
PR INTERVAL: 184 MS
PROT SERPL-MCNC: 6.1 G/DL (ref 6.4–8.2)
Q ONSET: 217 MS
QRS COUNT: 10 BEATS
QRS DURATION: 100 MS
QT INTERVAL: 454 MS
QTC CALCULATION(BAZETT): 441 MS
QTC FREDERICIA: 446 MS
R AXIS: -19 DEGREES
RBC # BLD AUTO: 2.98 X10*6/UL (ref 4–5.2)
SODIUM SERPL-SCNC: 143 MMOL/L (ref 136–145)
T AXIS: 21 DEGREES
T OFFSET: 444 MS
VENTRICULAR RATE: 57 BPM
WBC # BLD AUTO: 4.4 X10*3/UL (ref 4.4–11.3)

## 2023-10-20 PROCEDURE — 2500000001 HC RX 250 WO HCPCS SELF ADMINISTERED DRUGS (ALT 637 FOR MEDICARE OP): Performed by: NURSE PRACTITIONER

## 2023-10-20 PROCEDURE — 82947 ASSAY GLUCOSE BLOOD QUANT: CPT

## 2023-10-20 PROCEDURE — 36415 COLL VENOUS BLD VENIPUNCTURE: CPT | Performed by: INTERNAL MEDICINE

## 2023-10-20 PROCEDURE — 99233 SBSQ HOSP IP/OBS HIGH 50: CPT | Performed by: NURSE PRACTITIONER

## 2023-10-20 PROCEDURE — 99222 1ST HOSP IP/OBS MODERATE 55: CPT | Performed by: PHYSICIAN ASSISTANT

## 2023-10-20 PROCEDURE — 94640 AIRWAY INHALATION TREATMENT: CPT

## 2023-10-20 PROCEDURE — 80053 COMPREHEN METABOLIC PANEL: CPT | Performed by: INTERNAL MEDICINE

## 2023-10-20 PROCEDURE — G0378 HOSPITAL OBSERVATION PER HR: HCPCS

## 2023-10-20 PROCEDURE — 2500000004 HC RX 250 GENERAL PHARMACY W/ HCPCS (ALT 636 FOR OP/ED): Performed by: INTERNAL MEDICINE

## 2023-10-20 PROCEDURE — 85027 COMPLETE CBC AUTOMATED: CPT | Performed by: INTERNAL MEDICINE

## 2023-10-20 PROCEDURE — 1200000002 HC GENERAL ROOM WITH TELEMETRY DAILY

## 2023-10-20 PROCEDURE — 93005 ELECTROCARDIOGRAM TRACING: CPT

## 2023-10-20 PROCEDURE — 99222 1ST HOSP IP/OBS MODERATE 55: CPT | Performed by: NEUROLOGICAL SURGERY

## 2023-10-20 PROCEDURE — 2500000004 HC RX 250 GENERAL PHARMACY W/ HCPCS (ALT 636 FOR OP/ED): Performed by: NURSE PRACTITIONER

## 2023-10-20 PROCEDURE — 72146 MRI CHEST SPINE W/O DYE: CPT | Performed by: RADIOLOGY

## 2023-10-20 PROCEDURE — 72146 MRI CHEST SPINE W/O DYE: CPT | Mod: ME

## 2023-10-20 PROCEDURE — 2500000002 HC RX 250 W HCPCS SELF ADMINISTERED DRUGS (ALT 637 FOR MEDICARE OP, ALT 636 FOR OP/ED): Performed by: NURSE PRACTITIONER

## 2023-10-20 RX ORDER — DEXTROSE 50 % IN WATER (D50W) INTRAVENOUS SYRINGE
25
Status: DISCONTINUED | OUTPATIENT
Start: 2023-10-20 | End: 2023-10-25 | Stop reason: HOSPADM

## 2023-10-20 RX ORDER — MULTIVIT-MIN/IRON FUM/FOLIC AC 7.5 MG-4
1 TABLET ORAL DAILY
Status: DISCONTINUED | OUTPATIENT
Start: 2023-10-20 | End: 2023-10-25 | Stop reason: HOSPADM

## 2023-10-20 RX ORDER — MYCOPHENOLATE MOFETIL 250 MG/1
500 CAPSULE ORAL 2 TIMES DAILY
Status: DISCONTINUED | OUTPATIENT
Start: 2023-10-20 | End: 2023-10-25 | Stop reason: HOSPADM

## 2023-10-20 RX ORDER — FOLIC ACID 1 MG/1
1 TABLET ORAL DAILY
Status: DISCONTINUED | OUTPATIENT
Start: 2023-10-20 | End: 2023-10-25 | Stop reason: HOSPADM

## 2023-10-20 RX ORDER — FLUDROCORTISONE ACETATE 0.1 MG/1
0.1 TABLET ORAL EVERY OTHER DAY
Status: DISCONTINUED | OUTPATIENT
Start: 2023-10-21 | End: 2023-10-25 | Stop reason: HOSPADM

## 2023-10-20 RX ORDER — RISPERIDONE 0.5 MG/1
0.5 TABLET ORAL NIGHTLY
Status: DISCONTINUED | OUTPATIENT
Start: 2023-10-20 | End: 2023-10-25 | Stop reason: HOSPADM

## 2023-10-20 RX ORDER — BUDESONIDE 0.5 MG/2ML
0.5 INHALANT ORAL
Status: DISCONTINUED | OUTPATIENT
Start: 2023-10-20 | End: 2023-10-25 | Stop reason: HOSPADM

## 2023-10-20 RX ORDER — LOPERAMIDE HYDROCHLORIDE 2 MG/1
2 CAPSULE ORAL 3 TIMES DAILY PRN
Status: DISCONTINUED | OUTPATIENT
Start: 2023-10-20 | End: 2023-10-25 | Stop reason: HOSPADM

## 2023-10-20 RX ORDER — ACETAMINOPHEN 500 MG
5 TABLET ORAL NIGHTLY
Status: DISCONTINUED | OUTPATIENT
Start: 2023-10-20 | End: 2023-10-25 | Stop reason: HOSPADM

## 2023-10-20 RX ORDER — TORSEMIDE 20 MG/1
10 TABLET ORAL DAILY
Status: DISCONTINUED | OUTPATIENT
Start: 2023-10-20 | End: 2023-10-25 | Stop reason: HOSPADM

## 2023-10-20 RX ORDER — PANTOPRAZOLE SODIUM 40 MG/1
40 TABLET, DELAYED RELEASE ORAL DAILY
Status: DISCONTINUED | OUTPATIENT
Start: 2023-10-20 | End: 2023-10-20 | Stop reason: SDUPTHER

## 2023-10-20 RX ORDER — DEXTROSE MONOHYDRATE 100 MG/ML
0.3 INJECTION, SOLUTION INTRAVENOUS ONCE AS NEEDED
Status: COMPLETED | OUTPATIENT
Start: 2023-10-20 | End: 2023-10-20

## 2023-10-20 RX ORDER — AMLODIPINE BESYLATE 2.5 MG/1
2.5 TABLET ORAL DAILY
Status: DISCONTINUED | OUTPATIENT
Start: 2023-10-20 | End: 2023-10-25 | Stop reason: HOSPADM

## 2023-10-20 RX ORDER — ATORVASTATIN CALCIUM 40 MG/1
40 TABLET, FILM COATED ORAL NIGHTLY
Status: DISCONTINUED | OUTPATIENT
Start: 2023-10-20 | End: 2023-10-25 | Stop reason: HOSPADM

## 2023-10-20 RX ORDER — INSULIN LISPRO 100 [IU]/ML
0-10 INJECTION, SOLUTION INTRAVENOUS; SUBCUTANEOUS
Status: DISCONTINUED | OUTPATIENT
Start: 2023-10-20 | End: 2023-10-25 | Stop reason: HOSPADM

## 2023-10-20 RX ORDER — PREDNISONE 5 MG/1
5 TABLET ORAL DAILY
Status: DISCONTINUED | OUTPATIENT
Start: 2023-10-20 | End: 2023-10-25 | Stop reason: HOSPADM

## 2023-10-20 RX ORDER — IPRATROPIUM BROMIDE AND ALBUTEROL SULFATE 2.5; .5 MG/3ML; MG/3ML
3 SOLUTION RESPIRATORY (INHALATION)
Status: DISCONTINUED | OUTPATIENT
Start: 2023-10-20 | End: 2023-10-21

## 2023-10-20 RX ORDER — CEFTRIAXONE 1 G/50ML
1 INJECTION, SOLUTION INTRAVENOUS EVERY 24 HOURS
Status: DISCONTINUED | OUTPATIENT
Start: 2023-10-20 | End: 2023-10-25 | Stop reason: HOSPADM

## 2023-10-20 RX ADMIN — MULTIPLE VITAMINS W/ MINERALS TAB 1 TABLET: TAB at 10:29

## 2023-10-20 RX ADMIN — RISPERIDONE 0.5 MG: 0.5 TABLET ORAL at 21:55

## 2023-10-20 RX ADMIN — AMLODIPINE BESYLATE 2.5 MG: 2.5 TABLET ORAL at 10:36

## 2023-10-20 RX ADMIN — IPRATROPIUM BROMIDE AND ALBUTEROL SULFATE 3 ML: 2.5; .5 SOLUTION RESPIRATORY (INHALATION) at 20:15

## 2023-10-20 RX ADMIN — ATORVASTATIN CALCIUM 40 MG: 40 TABLET, FILM COATED ORAL at 21:55

## 2023-10-20 RX ADMIN — Medication 5 MG: at 21:55

## 2023-10-20 RX ADMIN — PREDNISONE 5 MG: 5 TABLET ORAL at 10:29

## 2023-10-20 RX ADMIN — SODIUM CHLORIDE 100 ML/HR: 9 INJECTION, SOLUTION INTRAVENOUS at 08:48

## 2023-10-20 RX ADMIN — PANTOPRAZOLE SODIUM 40 MG: 40 TABLET, DELAYED RELEASE ORAL at 06:22

## 2023-10-20 RX ADMIN — APIXABAN 2.5 MG: 2.5 TABLET, FILM COATED ORAL at 21:55

## 2023-10-20 RX ADMIN — SODIUM CHLORIDE 100 ML/HR: 9 INJECTION, SOLUTION INTRAVENOUS at 00:06

## 2023-10-20 RX ADMIN — MYCOPHENOLATE MOFETIL 500 MG: 250 CAPSULE ORAL at 12:02

## 2023-10-20 RX ADMIN — FOLIC ACID 1 MG: 1 TABLET ORAL at 10:36

## 2023-10-20 RX ADMIN — CEFTRIAXONE SODIUM 1 G: 1 INJECTION, SOLUTION INTRAVENOUS at 22:02

## 2023-10-20 RX ADMIN — APIXABAN 2.5 MG: 2.5 TABLET, FILM COATED ORAL at 10:36

## 2023-10-20 RX ADMIN — DEXTROSE MONOHYDRATE 0.3 G/KG/HR: 100 INJECTION, SOLUTION INTRAVENOUS at 11:47

## 2023-10-20 RX ADMIN — BUDESONIDE 0.5 MG: 0.5 INHALANT RESPIRATORY (INHALATION) at 20:15

## 2023-10-20 RX ADMIN — MYCOPHENOLATE MOFETIL 500 MG: 250 CAPSULE ORAL at 21:54

## 2023-10-20 RX ADMIN — SODIUM CHLORIDE 100 ML/HR: 9 INJECTION, SOLUTION INTRAVENOUS at 08:45

## 2023-10-20 RX ADMIN — TORSEMIDE 10 MG: 20 TABLET ORAL at 10:29

## 2023-10-20 ASSESSMENT — COGNITIVE AND FUNCTIONAL STATUS - GENERAL
PERSONAL GROOMING: TOTAL
MOBILITY SCORE: 7
DRESSING REGULAR LOWER BODY CLOTHING: A LOT
EATING MEALS: A LOT
DRESSING REGULAR UPPER BODY CLOTHING: TOTAL
HELP NEEDED FOR BATHING: TOTAL
EATING MEALS: A LITTLE
WALKING IN HOSPITAL ROOM: TOTAL
MOVING FROM LYING ON BACK TO SITTING ON SIDE OF FLAT BED WITH BEDRAILS: A LOT
PERSONAL GROOMING: TOTAL
CLIMB 3 TO 5 STEPS WITH RAILING: TOTAL
STANDING UP FROM CHAIR USING ARMS: TOTAL
DAILY ACTIVITIY SCORE: 9
MOBILITY SCORE: 7
STANDING UP FROM CHAIR USING ARMS: TOTAL
DRESSING REGULAR UPPER BODY CLOTHING: TOTAL
WALKING IN HOSPITAL ROOM: TOTAL
TOILETING: TOTAL
TOILETING: TOTAL
MOVING FROM LYING ON BACK TO SITTING ON SIDE OF FLAT BED WITH BEDRAILS: A LOT
TURNING FROM BACK TO SIDE WHILE IN FLAT BAD: TOTAL
HELP NEEDED FOR BATHING: TOTAL
TURNING FROM BACK TO SIDE WHILE IN FLAT BAD: TOTAL
MOVING TO AND FROM BED TO CHAIR: TOTAL
CLIMB 3 TO 5 STEPS WITH RAILING: TOTAL
DAILY ACTIVITIY SCORE: 8
MOVING TO AND FROM BED TO CHAIR: TOTAL
DRESSING REGULAR LOWER BODY CLOTHING: A LOT

## 2023-10-20 ASSESSMENT — PAIN SCALES - GENERAL
PAINLEVEL_OUTOF10: 0 - NO PAIN

## 2023-10-20 ASSESSMENT — ENCOUNTER SYMPTOMS
WEAKNESS: 1
DYSURIA: 1

## 2023-10-20 ASSESSMENT — PAIN - FUNCTIONAL ASSESSMENT: PAIN_FUNCTIONAL_ASSESSMENT: 0-10

## 2023-10-20 NOTE — CARE PLAN
The patient's goals for the shift include no injury related to falls able to get some rest/sleep by the end of this shifT  Problem: Fall/Injury  Goal: Not fall by end of shift  Outcome: Progressing  Goal: Be free from injury by end of the shift  Outcome: Progressing  Goal: Verbalize understanding of personal risk factors for fall in the hospital  Outcome: Progressing  Goal: Verbalize understanding of risk factor reduction measures to prevent injury from fall in the home  Outcome: Progressing  Goal: Use assistive devices by end of the shift  Outcome: Progressing     Problem: Pain  Goal: Takes deep breaths with improved pain control throughout the shift  Outcome: Progressing  Goal: Turns in bed with improved pain control throughout the shift  Outcome: Progressing  Goal: Walks with improved pain control throughout the shift  Outcome: Progressing  Goal: Performs ADL's with improved pain control throughout shift  Outcome: Progressing       The clinical goals for the shift include no pain

## 2023-10-20 NOTE — NURSING NOTE
Was reported patient glucose was 45. Patient was not having any symptoms. Gave orange juice with 5 packs of sugar. Recheck patient went to 46.. notified Dr. Dee and D10 was started. Will recheck glucose and DC D10 once blood glucose reaches over 100. Continue to monitor patient glucose and vital signs

## 2023-10-20 NOTE — PROGRESS NOTES
"Bing Holliday is a 62 y.o. female on day 0 of admission presenting with Disorientation.      Subjective   No overnight events       Objective     Last Recorded Vitals  Blood pressure 137/78, pulse 64, temperature 36.2 °C (97.2 °F), resp. rate 16, height 1.803 m (5' 11\"), weight 94.6 kg (208 lb 8.9 oz), SpO2 95 %.    Physical Exam  Neurological Exam  Gen: NAD  Neuro:  --HIF: A&O X 3, repetition and naming intact  --CN:  PERRLA, EOMI, VFF, no visible facial asymmetry, facial sensation intact, no tongue or palatal deviation, SCM intact  --Motor:       UE:  5/5 except distally where contractures are noted b/l      LE:  hip flexion 3-, knee flexion 3, knee extension 3, DF 5. PF 5  --Sensory: Intact to light touch,   --Reflex: absent symmetric, toes down  --Cerebellum: FTN intact  --Gait: Deferred   Gen: NAD    Relevant Results  Scheduled medications  amLODIPine, 2.5 mg, oral, Daily  apixaban, 2.5 mg, oral, BID  atorvastatin, 40 mg, oral, Nightly  cefTRIAXone, 1 g, intravenous, q24h  [START ON 10/21/2023] fludrocortisone, 0.1 mg, oral, Every other day  fluticasone-umeclidin-vilanter, 1 puff, inhalation, Daily  folic acid, 1 mg, oral, Daily  insulin lispro, 0-10 Units, subcutaneous, Before meals & nightly  melatonin, 5 mg, oral, Nightly  multivitamin with minerals, 1 tablet, oral, Daily  mycophenolate, 500 mg, oral, BID  pantoprazole, 40 mg, oral, Daily before breakfast   Or  pantoprazole, 40 mg, intravenous, Daily before breakfast  predniSONE, 5 mg, oral, Daily  risperiDONE, 0.5 mg, oral, Nightly  torsemide, 10 mg, oral, Daily      Continuous medications  sodium chloride 0.9%, 100 mL/hr, Last Rate: 100 mL/hr (10/20/23 0848)      PRN medications  PRN medications: acetaminophen **OR** acetaminophen **OR** acetaminophen, acetaminophen **OR** acetaminophen **OR** acetaminophen, dextrose 10 % in water (D10W), dextrose, glucagon, loperamide, melatonin, metoclopramide **OR** metoclopramide, ondansetron ODT **OR** ondansetron, " polyethylene glycol    Results for orders placed or performed during the hospital encounter of 10/19/23 (from the past 24 hour(s))   POCT GLUCOSE   Result Value Ref Range    POCT Glucose 101 (H) 74 - 99 mg/dL   CBC and Auto Differential   Result Value Ref Range    WBC 3.7 (L) 4.4 - 11.3 x10*3/uL    nRBC 0.0 0.0 - 0.0 /100 WBCs    RBC 3.20 (L) 4.00 - 5.20 x10*6/uL    Hemoglobin 9.2 (L) 12.0 - 16.0 g/dL    Hematocrit 30.8 (L) 36.0 - 46.0 %    MCV 96 80 - 100 fL    MCH 28.8 26.0 - 34.0 pg    MCHC 29.9 (L) 32.0 - 36.0 g/dL    RDW 17.9 (H) 11.5 - 14.5 %    Platelets 100 (L) 150 - 450 x10*3/uL    MPV 13.5 (H) 7.5 - 11.5 fL    Immature Granulocytes %, Automated 0.0 0.0 - 0.9 %    Immature Granulocytes Absolute, Automated 0.00 0.00 - 0.70 x10*3/uL   Comprehensive metabolic panel   Result Value Ref Range    Glucose 103 (H) 74 - 99 mg/dL    Sodium 141 136 - 145 mmol/L    Potassium 5.4 (H) 3.5 - 5.3 mmol/L    Chloride 113 (H) 98 - 107 mmol/L    Bicarbonate 23 21 - 32 mmol/L    Anion Gap 10 10 - 20 mmol/L    Urea Nitrogen 55 (H) 6 - 23 mg/dL    Creatinine 1.47 (H) 0.50 - 1.05 mg/dL    eGFR 40 (L) >60 mL/min/1.73m*2    Calcium 8.9 8.6 - 10.3 mg/dL    Albumin 3.2 (L) 3.4 - 5.0 g/dL    Alkaline Phosphatase 98 33 - 136 U/L    Total Protein 6.2 (L) 6.4 - 8.2 g/dL    AST 37 9 - 39 U/L    Bilirubin, Total 0.2 0.0 - 1.2 mg/dL    ALT 28 7 - 45 U/L   Troponin I, High Sensitivity   Result Value Ref Range    Troponin I, High Sensitivity 12 0 - 13 ng/L   Protime-INR   Result Value Ref Range    Protime 14.9 (H) 9.8 - 12.8 seconds    INR 1.3 (H) 0.9 - 1.1   APTT   Result Value Ref Range    aPTT 55 (H) 27 - 38 seconds   TSH with reflex to Free T4 if abnormal   Result Value Ref Range    Thyroid Stimulating Hormone 4.72 (H) 0.44 - 3.98 mIU/L   Magnesium   Result Value Ref Range    Magnesium 1.88 1.60 - 2.40 mg/dL   Cardiac Enzymes - CPK   Result Value Ref Range    Creatine Kinase 75 0 - 215 U/L   Thyroxine, Free   Result Value Ref Range     Thyroxine, Free 0.82 0.61 - 1.12 ng/dL   Manual Differential   Result Value Ref Range    Neutrophils %, Manual 74.0 40.0 - 80.0 %    Bands %, Manual 2.0 0.0 - 5.0 %    Lymphocytes %, Manual 15.0 13.0 - 44.0 %    Monocytes %, Manual 6.0 2.0 - 10.0 %    Eosinophils %, Manual 1.0 0.0 - 6.0 %    Basophils %, Manual 2.0 0.0 - 2.0 %    Seg Neutrophils Absolute, Manual 2.74 1.20 - 7.00 x10*3/uL    Bands Absolute, Manual 0.07 0.00 - 0.70 x10*3/uL    Lymphocytes Absolute, Manual 0.56 (L) 1.20 - 4.80 x10*3/uL    Monocytes Absolute, Manual 0.22 0.10 - 1.00 x10*3/uL    Eosinophils Absolute, Manual 0.04 0.00 - 0.70 x10*3/uL    Basophils Absolute, Manual 0.07 0.00 - 0.10 x10*3/uL    Total Cells Counted 100     Neutrophils Absolute, Manual 2.81 1.20 - 7.70 x10*3/uL    RBC Morphology See Below     RBC Fragments Few     Teardrop Cells Few     Ogema Cells Few     Giant Platelets Few    TSH with reflex to Free T4 if abnormal   Result Value Ref Range    Thyroid Stimulating Hormone 4.65 (H) 0.44 - 3.98 mIU/L   C3 complement   Result Value Ref Range    C3 Complement 140 87 - 200 mg/dL   C4 complement   Result Value Ref Range    C4 Complement 54 (H) 10 - 50 mg/dL   C-reactive protein   Result Value Ref Range    C-Reactive Protein 0.63 <1.00 mg/dL   Creatine Kinase   Result Value Ref Range    Creatine Kinase 81 0 - 215 U/L   Thyroxine, Free   Result Value Ref Range    Thyroxine, Free 0.86 0.61 - 1.12 ng/dL   Urinalysis with Reflex Microscopic and Culture   Result Value Ref Range    Color, Urine Yellow Straw, Yellow    Appearance, Urine Hazy (N) Clear    Specific Gravity, Urine 1.006 1.005 - 1.035    pH, Urine 6.0 5.0, 5.5, 6.0, 6.5, 7.0, 7.5, 8.0    Protein, Urine 30 (1+) (N) NEGATIVE mg/dL    Glucose, Urine NEGATIVE NEGATIVE mg/dL    Blood, Urine NEGATIVE NEGATIVE    Ketones, Urine NEGATIVE NEGATIVE mg/dL    Bilirubin, Urine NEGATIVE NEGATIVE    Urobilinogen, Urine <2.0 <2.0 mg/dL    Nitrite, Urine NEGATIVE NEGATIVE    Leukocyte Esterase,  Urine LARGE (3+) (A) NEGATIVE   Extra Urine Gray Tube   Result Value Ref Range    Extra Tube Hold for add-ons.    Microscopic Only, Urine   Result Value Ref Range    WBC, Urine 21-50 (A) 1-5, NONE /HPF    RBC, Urine 3-5 NONE, 1-2, 3-5 /HPF    Bacteria, Urine 2+ (A) NONE SEEN /HPF   CBC   Result Value Ref Range    WBC 4.4 4.4 - 11.3 x10*3/uL    nRBC 0.0 0.0 - 0.0 /100 WBCs    RBC 2.98 (L) 4.00 - 5.20 x10*6/uL    Hemoglobin 8.5 (L) 12.0 - 16.0 g/dL    Hematocrit 28.3 (L) 36.0 - 46.0 %    MCV 95 80 - 100 fL    MCH 28.5 26.0 - 34.0 pg    MCHC 30.0 (L) 32.0 - 36.0 g/dL    RDW 17.8 (H) 11.5 - 14.5 %    Platelets 100 (L) 150 - 450 x10*3/uL    MPV     Comprehensive metabolic panel   Result Value Ref Range    Glucose 61 (L) 74 - 99 mg/dL    Sodium 143 136 - 145 mmol/L    Potassium 5.1 3.5 - 5.3 mmol/L    Chloride 113 (H) 98 - 107 mmol/L    Bicarbonate 23 21 - 32 mmol/L    Anion Gap 12 10 - 20 mmol/L    Urea Nitrogen 56 (H) 6 - 23 mg/dL    Creatinine 1.52 (H) 0.50 - 1.05 mg/dL    eGFR 39 (L) >60 mL/min/1.73m*2    Calcium 8.6 8.6 - 10.3 mg/dL    Albumin 3.1 (L) 3.4 - 5.0 g/dL    Alkaline Phosphatase 105 33 - 136 U/L    Total Protein 6.1 (L) 6.4 - 8.2 g/dL    AST 32 9 - 39 U/L    Bilirubin, Total 0.2 0.0 - 1.2 mg/dL    ALT 26 7 - 45 U/L   POCT GLUCOSE   Result Value Ref Range    POCT Glucose 67 (L) 74 - 99 mg/dL       CT C, T, and L Spine:  IMPRESSION:  No acute osseous abnormality in the cervical spine. Multilevel  degenerative change and neural foraminal narrowing.      Moderate age-indeterminate compression fracture T7 vertebral body.      No acute osseous abnormality in the lumbar spine. Multilevel severe  canal stenosis as well as neural foraminal narrowing.    MRI L Spine:  IMPRESSION:  Extensive degenerative changes with reversal of the lumbar lordosis  and severe L3-L4 canal stenosis, similar to slightly progressed from  05/05/2021. Additional findings of foraminal and lateral recess  narrowing as described.                          Assessment/Plan      Lower Extremity Weakness  - secondary to severe spinal stenosis in the lumbar region  - recommend NSGY evaluation    2.  Compression Fracture  - recommend NSGY evaluation      Kong Reardon MD

## 2023-10-20 NOTE — PROGRESS NOTES
Met with pt to review her dc plan. Pt staying with her son and his family at [address] in [place] [address]. Able to afford medications. Uses a walker and a family member is present at home at all times. Pt is active with [facility] and would like to resume those services if she returns home. Would like to go to [facility] if needed as she has been there in the past. Case management will follow along for needs.      Angela James RN

## 2023-10-20 NOTE — H&P
History Of Present Illness  Bing Holliday is a 62 y.o. female presenting with for evaluation of progressive lower extremity weakness, intermittent urinary incontinence, as well as a sharp frontal headache which began 90 minutes prior to initial evaluation.  Initial evaluation in the emergency department which did not include consultation with neurology service failed to reveal any significant pathology.  This included CT scan of the axial spine.  UA obtained consistent with cystitis.  At the time of my assessment, patient is resting comfortably in her bed.  She denies any persistent headache.  Does endorse several episodes of urinary incontinence as well as they describe lower extremity weakness.  States that she feels very weak but has been participating with home health care and home physical therapy.  Patient lives at home with her son and at baseline ambulates with a walker. Admitted for further evaluation     Past Medical History  Past Medical History:   Diagnosis Date    Acute upper respiratory infection, unspecified 03/04/2020    Acute URI    Acute upper respiratory infection, unspecified 09/30/2015    URTI (acute upper respiratory infection)    Arthritis     Body mass index (BMI) 23.0-23.9, adult 10/15/2021    BMI 23.0-23.9, adult    Body mass index (BMI) 33.0-33.9, adult 03/04/2020    BMI 33.0-33.9,adult    Cardiomegaly 08/27/2013    Left ventricular hypertrophy    Chronic kidney disease, stage 3 unspecified (CMS/[facility]) 07/02/2013    Chronic kidney disease, stage III (moderate)    Disease of pericardium, unspecified 07/02/2013    Pericardial disease    Encounter for follow-up examination after completed treatment for conditions other than malignant neoplasm 10/06/2022    Hospital discharge follow-up    Generalized contraction of visual field, right eye 01/29/2015    Generalized contraction of visual field of right eye    Homonymous bilateral field defects, right side 04/29/2016    Homonymous bilateral  field defects of right side    Hypertensive chronic kidney disease with stage 1 through stage 4 chronic kidney disease, or unspecified chronic kidney disease 07/02/2013    Nephrosclerosis    Laceration without foreign body, left foot, initial encounter 07/03/2018    Foot laceration, left, initial encounter    Migraine with aura, not intractable, without status migrainosus 10/24/2022    Ocular migraine    Other conditions influencing health status 07/02/2013    Chronic Glomerulonephritis In Diseases Classified Elsewhere    Other conditions influencing health status 07/02/2013    Progressive Familial Myoclonic Epilepsy    Other conditions influencing health status 07/02/2013    Protein S Deficiency    Other conditions influencing health status 05/22/2015    Familial Combined Hyperlipidemia    Other conditions influencing health status 10/24/2022    IDDM (insulin dependent diabetes mellitus)    Other conditions influencing health status 03/14/2022    Diabetes mellitus, insulin dependent (IDDM), uncontrolled    Other long term (current) drug therapy 10/24/2022    Long-term use of Plaquenil    Personal history of diseases of the blood and blood-forming organs and certain disorders involving the immune mechanism 07/02/2013    History of thrombocytopenia    Personal history of diseases of the skin and subcutaneous tissue 08/11/2015    History of foot ulcer    Personal history of nephrotic syndrome 07/02/2013    History of nephrotic syndrome    Personal history of other diseases of the circulatory system 08/27/2013    History of sinus tachycardia    Personal history of other diseases of the nervous system and sense organs     History of cataract    Personal history of other diseases of the respiratory system     History of bronchitis    Personal history of other infectious and parasitic diseases 07/02/2013    History of hepatitis    Personal history of other specified conditions     History of shortness of breath     Personal history of other specified conditions 08/27/2013    History of edema    Puckering of macula, right eye 10/24/2022    ERM OD (epiretinal membrane, right eye)    Raynaud's syndrome without gangrene 07/02/2013    Raynaud's disease    Systemic lupus erythematosus, unspecified (CMS/HCC) 07/24/2015    SLE (systemic lupus erythematosus)    Systemic lupus erythematosus, unspecified (CMS/HCC) 07/24/2015    SLE (systemic lupus erythematosus)    Systemic lupus erythematosus, unspecified (CMS/HCC) 07/24/2015    Systemic lupus    Type 2 diabetes mellitus with diabetic nephropathy (CMS/HCC) 07/02/2013    Type 2 diabetes with nephropathy    Type 2 diabetes mellitus with mild nonproliferative diabetic retinopathy without macular edema, left eye (CMS/HCC) 07/27/2015    Non-proliferative diabetic retinopathy, left eye    Type 2 diabetes mellitus with mild nonproliferative diabetic retinopathy without macular edema, unspecified eye (CMS/HCC) 07/24/2015    Mild non proliferative diabetic retinopathy    Type 2 diabetes mellitus with proliferative diabetic retinopathy without macular edema, right eye (CMS/HCC) 07/27/2015    Proliferative diabetic retinopathy of right eye    Type 2 diabetes mellitus with proliferative diabetic retinopathy without macular edema, unspecified eye (CMS/HCC) 07/24/2015    Diabetic proliferative retinopathy    Unspecified acute and subacute iridocyclitis 07/24/2015    Acute iritis, right eye    Unspecified open wound, left foot, sequela 07/03/2018    Wound, open, foot, left, sequela       Surgical History  Past Surgical History:   Procedure Laterality Date    ANKLE SURGERY  01/29/2015    Ankle Surgery    CHOLECYSTECTOMY  01/29/2015    Cholecystectomy    CT GUIDED PERCUTANEOUS BIOPSY BONE DEEP  5/4/2021    CT GUIDED PERCUTANEOUS BIOPSY BONE DEEP 5/4/2021 [facility] CLINICAL LEGACY    EYE SURGERY  03/06/2015    Eye Surgery    FOOT SURGERY  01/29/2015    Foot Surgery    MR HEAD ANGIO WO IV CONTRAST  7/26/2013     MR HEAD ANGIO WO IV CONTRAST 7/26/2013 [facility] CLINICAL LEGACY    MR HEAD ANGIO WO IV CONTRAST  9/17/2021    MR HEAD ANGIO WO IV CONTRAST 9/17/2021 [facility] EMERGENCY LEGACY    MR HEAD ANGIO WO IV CONTRAST  3/25/2023    MR HEAD ANGIO WO IV CONTRAST [facility]    MR NECK ANGIO WO IV CONTRAST  7/26/2013    MR NECK ANGIO WO IV CONTRAST 7/26/2013 [facility] CLINICAL LEGACY    MR NECK ANGIO WO IV CONTRAST  9/17/2021    MR NECK ANGIO WO IV CONTRAST 9/17/2021 [facility] EMERGENCY LEGAC[facility]    MR NECK ANGIO WO IV CONTRAST  3/25/2023    MR NECK ANGIO WO IV CONTRAST [facility] MRI    OTHER SURGICAL HISTORY  01/29/2015    Creation Of Pericardial Window    OTHER SURGICAL HISTORY  01/29/2015    Quadricepsplasty    TOTAL HIP ARTHROPLASTY  01/29/2015    Hip Replacement        Social History  She reports that she has never smoked. She has never used smokeless tobacco. She reports that she does not drink alcohol and does not use drugs.    Family History  No family history on file.     Allergies  Ace inhibitors, Hydroxychloroquine, Lisinopril, Penicillins, and Sulfa (sulfonamide antibiotics)    Review of Systems   Genitourinary:  Positive for dysuria.   Neurological:  Positive for weakness.   All other systems reviewed and are negative.       Physical Exam  Vitals reviewed.   Constitutional:       Appearance: Normal appearance.   HENT:      Head: Normocephalic and atraumatic.      Nose: Nose normal.      Mouth/Throat:      Mouth: Mucous membranes are moist.   Eyes:      Extraocular Movements: Extraocular movements intact.      Conjunctiva/sclera: Conjunctivae normal.      Pupils: Pupils are equal, round, and reactive to light.   Cardiovascular:      Rate and Rhythm: Normal rate and regular rhythm.      Pulses: Normal pulses.      Heart sounds: Normal heart sounds.   Pulmonary:      Effort: Pulmonary effort is normal.      Breath sounds: Normal breath sounds.   Abdominal:      General: Bowel sounds are normal.      Palpations: Abdomen is soft.   Musculoskeletal:     "     General: Normal range of motion.      Cervical back: Normal range of motion and neck supple.   Skin:     General: Skin is warm and dry.   Neurological:      General: No focal deficit present.      Mental Status: She is alert. Mental status is at baseline.   Psychiatric:         Mood and Affect: Mood normal.         Behavior: Behavior normal.          Last Recorded Vitals  Blood pressure 140/76, pulse 52, temperature 36.6 °C (97.9 °F), temperature source Temporal, resp. rate 13, height 1.803 m (5' 11\"), weight 99.4 kg (219 lb 2.2 oz), SpO2 96 %.    Relevant Results  Scheduled medications  pantoprazole, 40 mg, oral, Daily before breakfast   Or  pantoprazole, 40 mg, intravenous, Daily before breakfast      Continuous medications  sodium chloride 0.9%, 100 mL/hr, Last Rate: 100 mL/hr (10/20/23 0006)      PRN medications  PRN medications: acetaminophen **OR** acetaminophen **OR** acetaminophen, acetaminophen **OR** acetaminophen **OR** acetaminophen, melatonin, metoclopramide **OR** metoclopramide, ondansetron ODT **OR** ondansetron, polyethylene glycol  Results for orders placed or performed during the hospital encounter of 10/19/23 (from the past 24 hour(s))   POCT GLUCOSE   Result Value Ref Range    POCT Glucose 101 (H) 74 - 99 mg/dL   CBC and Auto Differential   Result Value Ref Range    WBC 3.7 (L) 4.4 - 11.3 x10*3/uL    nRBC 0.0 0.0 - 0.0 /100 WBCs    RBC 3.20 (L) 4.00 - 5.20 x10*6/uL    Hemoglobin 9.2 (L) 12.0 - 16.0 g/dL    Hematocrit 30.8 (L) 36.0 - 46.0 %    MCV 96 80 - 100 fL    MCH 28.8 26.0 - 34.0 pg    MCHC 29.9 (L) 32.0 - 36.0 g/dL    RDW 17.9 (H) 11.5 - 14.5 %    Platelets 100 (L) 150 - 450 x10*3/uL    MPV 13.5 (H) 7.5 - 11.5 fL    Immature Granulocytes %, Automated 0.0 0.0 - 0.9 %    Immature Granulocytes Absolute, Automated 0.00 0.00 - 0.70 x10*3/uL   Comprehensive metabolic panel   Result Value Ref Range    Glucose 103 (H) 74 - 99 mg/dL    Sodium 141 136 - 145 mmol/L    Potassium 5.4 (H) 3.5 - 5.3 " mmol/L    Chloride 113 (H) 98 - 107 mmol/L    Bicarbonate 23 21 - 32 mmol/L    Anion Gap 10 10 - 20 mmol/L    Urea Nitrogen 55 (H) 6 - 23 mg/dL    Creatinine 1.47 (H) 0.50 - 1.05 mg/dL    eGFR 40 (L) >60 mL/min/1.73m*2    Calcium 8.9 8.6 - 10.3 mg/dL    Albumin 3.2 (L) 3.4 - 5.0 g/dL    Alkaline Phosphatase 98 33 - 136 U/L    Total Protein 6.2 (L) 6.4 - 8.2 g/dL    AST 37 9 - 39 U/L    Bilirubin, Total 0.2 0.0 - 1.2 mg/dL    ALT 28 7 - 45 U/L   Troponin I, High Sensitivity   Result Value Ref Range    Troponin I, High Sensitivity 12 0 - 13 ng/L   Protime-INR   Result Value Ref Range    Protime 14.9 (H) 9.8 - 12.8 seconds    INR 1.3 (H) 0.9 - 1.1   APTT   Result Value Ref Range    aPTT 55 (H) 27 - 38 seconds   TSH with reflex to Free T4 if abnormal   Result Value Ref Range    Thyroid Stimulating Hormone 4.72 (H) 0.44 - 3.98 mIU/L   Magnesium   Result Value Ref Range    Magnesium 1.88 1.60 - 2.40 mg/dL   Cardiac Enzymes - CPK   Result Value Ref Range    Creatine Kinase 75 0 - 215 U/L   Thyroxine, Free   Result Value Ref Range    Thyroxine, Free 0.82 0.61 - 1.12 ng/dL   Manual Differential   Result Value Ref Range    Neutrophils %, Manual 74.0 40.0 - 80.0 %    Bands %, Manual 2.0 0.0 - 5.0 %    Lymphocytes %, Manual 15.0 13.0 - 44.0 %    Monocytes %, Manual 6.0 2.0 - 10.0 %    Eosinophils %, Manual 1.0 0.0 - 6.0 %    Basophils %, Manual 2.0 0.0 - 2.0 %    Seg Neutrophils Absolute, Manual 2.74 1.20 - 7.00 x10*3/uL    Bands Absolute, Manual 0.07 0.00 - 0.70 x10*3/uL    Lymphocytes Absolute, Manual 0.56 (L) 1.20 - 4.80 x10*3/uL    Monocytes Absolute, Manual 0.22 0.10 - 1.00 x10*3/uL    Eosinophils Absolute, Manual 0.04 0.00 - 0.70 x10*3/uL    Basophils Absolute, Manual 0.07 0.00 - 0.10 x10*3/uL    Total Cells Counted 100     Neutrophils Absolute, Manual 2.81 1.20 - 7.70 x10*3/uL    RBC Morphology See Below     RBC Fragments Few     Teardrop Cells Few     Katarzyna Cells Few     Giant Platelets Few    TSH with reflex to Free T4  if abnormal   Result Value Ref Range    Thyroid Stimulating Hormone 4.65 (H) 0.44 - 3.98 mIU/L   C3 complement   Result Value Ref Range    C3 Complement 140 87 - 200 mg/dL   C4 complement   Result Value Ref Range    C4 Complement 54 (H) 10 - 50 mg/dL   C-reactive protein   Result Value Ref Range    C-Reactive Protein 0.63 <1.00 mg/dL   Creatine Kinase   Result Value Ref Range    Creatine Kinase 81 0 - 215 U/L   Thyroxine, Free   Result Value Ref Range    Thyroxine, Free 0.86 0.61 - 1.12 ng/dL   Urinalysis with Reflex Microscopic and Culture   Result Value Ref Range    Color, Urine Yellow Straw, Yellow    Appearance, Urine Hazy (N) Clear    Specific Gravity, Urine 1.006 1.005 - 1.035    pH, Urine 6.0 5.0, 5.5, 6.0, 6.5, 7.0, 7.5, 8.0    Protein, Urine 30 (1+) (N) NEGATIVE mg/dL    Glucose, Urine NEGATIVE NEGATIVE mg/dL    Blood, Urine NEGATIVE NEGATIVE    Ketones, Urine NEGATIVE NEGATIVE mg/dL    Bilirubin, Urine NEGATIVE NEGATIVE    Urobilinogen, Urine <2.0 <2.0 mg/dL    Nitrite, Urine NEGATIVE NEGATIVE    Leukocyte Esterase, Urine LARGE (3+) (A) NEGATIVE   Extra Urine Gray Tube   Result Value Ref Range    Extra Tube Hold for add-ons.    Microscopic Only, Urine   Result Value Ref Range    WBC, Urine 21-50 (A) 1-5, NONE /HPF    RBC, Urine 3-5 NONE, 1-2, 3-5 /HPF    Bacteria, Urine 2+ (A) NONE SEEN /HPF     MR lumbar spine wo IV contrast    Result Date: 10/19/2023  Interpreted By:  Lo Judge, STUDY: MR LUMBAR SPINE WO IV CONTRAST;  10/19/2023 9:16 pm   INDICATION: Signs/Symptoms:incontinence new onset.   COMPARISON: CT lumbar spine 10/19/2023, MRI 05/05/2021   ACCESSION NUMBER(S): [id number]   ORDERING CLINICIAN: JADA BURRELL   TECHNIQUE: Sagittal T1, T2, STIR, axial T1 and T2 weighted images of the lumbar spine were acquired.   FINDINGS: Alignment: Reversal of the cervical curvature and dextroscoliosis centered about L3, similar to prior imaging. Minimal retrolisthesis of L3 on L4. Minimal retrolisthesis  of L4 on L5.   Vertebrae/Intervertebral Discs: Multilevel chronic appearing vertebral body height loss most prominent at L2, L3 and L4 similar to prior imaging.Mild chronic endplate bone marrow signal changes. No significant bone marrow edema identified. Probable osseous demineralization. Multilevel intervertebral disc space narrowing and disc desiccation without significant intervertebral disc space edema.   Conus: The lower thoracic cord appears unremarkable. The conus terminates at L1.   T12-L1: Disc osteophyte complex and posterior ligamentous hypertrophy with facet arthropathy resulting in mild-to-moderate canal narrowing. Mild foraminal narrowing.   L1-2: Mild disc bulge and facet hypertrophy with moderate canal narrowing and moderate left lateral recess stenosis. Mild right and moderate left foraminal narrowing.   L2-3: Disc osteophyte complex and facet hypertrophy with moderate canal and moderate to severe lateral recess stenosis. Mild to moderate foraminal stenosis.   L3-4: Central to left paracentral disc osteophyte complex with retrolisthesis, ligamentous hypertrophy and facet arthropathy resulting in severe canal stenosis, similar are mildly progressed from prior imaging. Moderate right and severe left foraminal stenosis.   L4-5: Disc bulge and posterior ligamentous hypertrophy with facet arthropathy resulting in at least moderate canal narrowing. Mild to moderate left and severe right foraminal stenosis.   L5-S1: Disc bulge and facet hypertrophy with mild canal narrowing and moderate left lateral recess stenosis. Moderate right and mild left foraminal narrowing.   The prevertebral and posterior paraspinous soft tissues are unremarkable. A probable left renal cyst.       Extensive degenerative changes with reversal of the lumbar lordosis and severe L3-L4 canal stenosis, similar to slightly progressed from 05/05/2021. Additional findings of foraminal and lateral recess narrowing as described.   No  evidence of acute fracture or traumatic subluxation.   MACRO: None   Signed by: Lo Judge 10/19/2023 10:00 PM Dictation workstation:   [id number]    CT cervical spine wo IV contrast    Result Date: 10/19/2023  Interpreted By:  Mariia Concepcion, STUDY: CT CERVICAL SPINE WO IV CONTRAST; CT LUMBAR SPINE WO IV CONTRAST; CT THORACIC SPINE WO IV CONTRAST;  10/19/2023 5:07 pm   INDICATION: Signs/Symptoms:per neuro request; Signs/Symptoms:per neuro request for lower extremity weakness.   COMPARISON: None.   ACCESSION NUMBER(S): [id number]; [id number]; [id number]   ORDERING CLINICIAN: JADA BURRELL   TECHNIQUE: Axial CT images of the cervical spine are obtained. Axial, coronal and sagittal reconstructions are provided for review. Axial CT images of the thoracic spine are obtained. Axial, coronal and sagittal reconstructions are provided for review.Axial CT images of the lumbar spine are obtained. Axial, coronal and sagittal reconstructions are provided for review.   FINDINGS: Cervical spine: No acute fracture or subluxation. Reversal of normal cervical lordosis in the midcervical spine. Mild-moderate disc height loss seen at C3-C4, C4-C5, C5-C6, and C6-C7 with multilevel endplate erosions, greatest at C5-C6 and C6-C7. Multilevel marginal osteophytes C3-C7. Multilevel facet arthropathy in the cervical spine. Moderate right-sided neural foraminal narrowing at C3-C4, severe on the left at C4-C5, mild on the left at C5-C6, and moderate bilaterally at C6-C7. No prevertebral hematoma.   Thoracic spine: Moderate compression deformity in T7 vertebral body, age indeterminate. Levocurvature in the lower thoracic spine. Scattered facet arthropathy. Multilevel vacuum phenomenon T9-T10, T10-T11, and T11-T12. Subcentimeter bone island in T1 vertebral body. There is dense material in the urinary system bilaterally suggestive of excreted contrast. Heavy coronary artery calcifications. Streaky opacities in the lungs  dependently.   Lumbar spine: Multilevel advanced disc height loss L2-L3, L3-L4, and L4-L5 with endplate erosions and marginal osteophyte formation. Levoscoliosis of the lumbar spine. Facet arthropathy in the lumbar spine, greatest inferiorly. Multilevel spinal canal stenosis in the lower lumbar spine, at L2-L3 measuring 6 mm, at L3-L4 measuring 5 mm, and at L4-L5 measuring 4 mm. Bilateral neural foraminal narrowing is also seen at these levels. Partially visualized right hip arthroplasty with associated streak artifact. Sigmoid colonic diverticulosis. Atherosclerosis.       No acute osseous abnormality in the cervical spine. Multilevel degenerative change and neural foraminal narrowing.   Moderate age-indeterminate compression fracture T7 vertebral body.   No acute osseous abnormality in the lumbar spine. Multilevel severe canal stenosis as well as neural foraminal narrowing.   Signed by: Mariia Concepcion 10/19/2023 6:06 PM Dictation workstation:   [id number]    CT thoracic spine wo IV contrast    Result Date: 10/19/2023  Interpreted By:  Mariia Concepcion, STUDY: CT CERVICAL SPINE WO IV CONTRAST; CT LUMBAR SPINE WO IV CONTRAST; CT THORACIC SPINE WO IV CONTRAST;  10/19/2023 5:07 pm   INDICATION: Signs/Symptoms:per neuro request; Signs/Symptoms:per neuro request for lower extremity weakness.   COMPARISON: None.   ACCESSION NUMBER(S): [id number]; [id number]; [id number]   ORDERING CLINICIAN: JADA BURRELL   TECHNIQUE: Axial CT images of the cervical spine are obtained. Axial, coronal and sagittal reconstructions are provided for review. Axial CT images of the thoracic spine are obtained. Axial, coronal and sagittal reconstructions are provided for review.Axial CT images of the lumbar spine are obtained. Axial, coronal and sagittal reconstructions are provided for review.   FINDINGS: Cervical spine: No acute fracture or subluxation. Reversal of normal cervical lordosis in the midcervical spine. Mild-moderate  disc height loss seen at C3-C4, C4-C5, C5-C6, and C6-C7 with multilevel endplate erosions, greatest at C5-C6 and C6-C7. Multilevel marginal osteophytes C3-C7. Multilevel facet arthropathy in the cervical spine. Moderate right-sided neural foraminal narrowing at C3-C4, severe on the left at C4-C5, mild on the left at C5-C6, and moderate bilaterally at C6-C7. No prevertebral hematoma.   Thoracic spine: Moderate compression deformity in T7 vertebral body, age indeterminate. Levocurvature in the lower thoracic spine. Scattered facet arthropathy. Multilevel vacuum phenomenon T9-T10, T10-T11, and T11-T12. Subcentimeter bone island in T1 vertebral body. There is dense material in the urinary system bilaterally suggestive of excreted contrast. Heavy coronary artery calcifications. Streaky opacities in the lungs dependently.   Lumbar spine: Multilevel advanced disc height loss L2-L3, L3-L4, and L4-L5 with endplate erosions and marginal osteophyte formation. Levoscoliosis of the lumbar spine. Facet arthropathy in the lumbar spine, greatest inferiorly. Multilevel spinal canal stenosis in the lower lumbar spine, at L2-L3 measuring 6 mm, at L3-L4 measuring 5 mm, and at L4-L5 measuring 4 mm. Bilateral neural foraminal narrowing is also seen at these levels. Partially visualized right hip arthroplasty with associated streak artifact. Sigmoid colonic diverticulosis. Atherosclerosis.       No acute osseous abnormality in the cervical spine. Multilevel degenerative change and neural foraminal narrowing.   Moderate age-indeterminate compression fracture T7 vertebral body.   No acute osseous abnormality in the lumbar spine. Multilevel severe canal stenosis as well as neural foraminal narrowing.   Signed by: Mariia Concepcion 10/19/2023 6:06 PM Dictation workstation:   [id number]    CT lumbar spine wo IV contrast    Result Date: 10/19/2023  Interpreted By:  Mariia Concepcion, STUDY: CT CERVICAL SPINE WO IV CONTRAST; CT LUMBAR SPINE WO  IV CONTRAST; CT THORACIC SPINE WO IV CONTRAST;  10/19/2023 5:07 pm   INDICATION: Signs/Symptoms:per neuro request; Signs/Symptoms:per neuro request for lower extremity weakness.   COMPARISON: None.   ACCESSION NUMBER(S): [id number]; [id number]; [id number]   ORDERING CLINICIAN: JADA BURRELL   TECHNIQUE: Axial CT images of the cervical spine are obtained. Axial, coronal and sagittal reconstructions are provided for review. Axial CT images of the thoracic spine are obtained. Axial, coronal and sagittal reconstructions are provided for review.Axial CT images of the lumbar spine are obtained. Axial, coronal and sagittal reconstructions are provided for review.   FINDINGS: Cervical spine: No acute fracture or subluxation. Reversal of normal cervical lordosis in the midcervical spine. Mild-moderate disc height loss seen at C3-C4, C4-C5, C5-C6, and C6-C7 with multilevel endplate erosions, greatest at C5-C6 and C6-C7. Multilevel marginal osteophytes C3-C7. Multilevel facet arthropathy in the cervical spine. Moderate right-sided neural foraminal narrowing at C3-C4, severe on the left at C4-C5, mild on the left at C5-C6, and moderate bilaterally at C6-C7. No prevertebral hematoma.   Thoracic spine: Moderate compression deformity in T7 vertebral body, age indeterminate. Levocurvature in the lower thoracic spine. Scattered facet arthropathy. Multilevel vacuum phenomenon T9-T10, T10-T11, and T11-T12. Subcentimeter bone island in T1 vertebral body. There is dense material in the urinary system bilaterally suggestive of excreted contrast. Heavy coronary artery calcifications. Streaky opacities in the lungs dependently.   Lumbar spine: Multilevel advanced disc height loss L2-L3, L3-L4, and L4-L5 with endplate erosions and marginal osteophyte formation. Levoscoliosis of the lumbar spine. Facet arthropathy in the lumbar spine, greatest inferiorly. Multilevel spinal canal stenosis in the lower lumbar spine, at L2-L3 measuring  6 mm, at L3-L4 measuring 5 mm, and at L4-L5 measuring 4 mm. Bilateral neural foraminal narrowing is also seen at these levels. Partially visualized right hip arthroplasty with associated streak artifact. Sigmoid colonic diverticulosis. Atherosclerosis.       No acute osseous abnormality in the cervical spine. Multilevel degenerative change and neural foraminal narrowing.   Moderate age-indeterminate compression fracture T7 vertebral body.   No acute osseous abnormality in the lumbar spine. Multilevel severe canal stenosis as well as neural foraminal narrowing.   Signed by: Mariia Concepcion 10/19/2023 6:06 PM Dictation workstation:   [id number]    XR chest 1 view    Result Date: 10/19/2023  Interpreted By:  Nic Moseley, STUDY: XR CHEST 1 VIEW  10/19/2023 3:11 pm   INDICATION: Signs/Symptoms:bat weakness   COMPARISON: 08/27/2023   ACCESSION NUMBER(S): [id number]   ORDERING CLINICIAN: JADA BURRELL   TECHNIQUE: A single AP portable radiograph of the chest was obtained.   FINDINGS: Multiple cardiac monitoring leads are seen over the chest.   No focal infiltrate, pleural effusion or pneumothorax is identified. The cardiac silhouette is prominent, similar to prior studies.       No focal infiltrate or pneumothorax is identified.   MACRO: None.   Signed by: Nic Moseley 10/19/2023 3:13 PM Dictation workstation:   [id number]    CT angio brain attack head w and wo IV contrast and post procedure    Result Date: 10/19/2023  Interpreted By:  Jairo Moreno, STUDY: CT ANGIO BRAIN ATTACK HEAD W AND WO IV CONTRAST AND POST PROCEDURE; CT ANGIO BRAIN ATTACK NECK W IV CONTRAST AND POST PROCEDURE; 10/19/2023 2:57 pm   INDICATION: Signs/Symptoms:hemorrhagic stroke alert; Signs/Symptoms:bat.   COMPARISON: Head CT, same day   ACCESSION NUMBER(S): [id number]; [id number]   ORDERING CLINICIAN: JADA BURRELL   TECHNIQUE: 50 mL Omnipaque 350 was administered intravenously and axial images of the head and neck were acquired.  Coronal and sagittal MIPs and 3-D reconstructions were created on an independent workstation and provided for review. Image quality is significantly degraded by poor bolus timing.   FINDINGS: CTA Head:   There is minimal arterial phase enhancement, no proximal large vessel occlusion in the [id number].   CTA Neck:   There is minimal arterial phase enhancement, no high-grade narrowing or occlusion of the major cervical arteries.   The soft tissues of the neck are unremarkable. The visualized lungs are clear. Dental caries with associated periapical lucency in the left maxilla. Degenerative changes in the spine and partially visualized glenohumeral joints.       Limited exam due to poor bolus timing, no large vessel occlusion in the head or neck.   MACRO: None   Signed by: Jairo Moreno 10/19/2023 3:08 PM Dictation workstation:   [id number]    CT angio brain attack neck w IV contrast and post procedure    Result Date: 10/19/2023  Interpreted By:  Jairo Moreno, STUDY: CT ANGIO BRAIN ATTACK HEAD W AND WO IV CONTRAST AND POST PROCEDURE; CT ANGIO BRAIN ATTACK NECK W IV CONTRAST AND POST PROCEDURE; 10/19/2023 2:57 pm   INDICATION: Signs/Symptoms:hemorrhagic stroke alert; Signs/Symptoms:bat.   COMPARISON: Head CT, same day   ACCESSION NUMBER(S): [id number]; [id number]   ORDERING CLINICIAN: JADA BURRELL   TECHNIQUE: 50 mL Omnipaque 350 was administered intravenously and axial images of the head and neck were acquired. Coronal and sagittal MIPs and 3-D reconstructions were created on an independent workstation and provided for review. Image quality is significantly degraded by poor bolus timing.   FINDINGS: CTA Head:   There is minimal arterial phase enhancement, no proximal large vessel occlusion in the diapul-hj-Ucatxu.   CTA Neck:   There is minimal arterial phase enhancement, no high-grade narrowing or occlusion of the major cervical arteries.   The soft tissues of the neck are unremarkable. The  visualized lungs are clear. Dental caries with associated periapical lucency in the left maxilla. Degenerative changes in the spine and partially visualized glenohumeral joints.       Limited exam due to poor bolus timing, no large vessel occlusion in the head or neck.   MACRO: None   Signed by: Jairo Moreno 10/19/2023 3:08 PM Dictation workstation:   [id number]    CT brain attack head wo IV contrast    Result Date: 10/19/2023  Interpreted By:  Gaston Beyer, STUDY: CT BRAIN ATTACK HEAD WO IV CONTRAST;  10/19/2023 2:38 pm   INDICATION: Signs/Symptoms:Stroke Evaluation.   COMPARISON: 08/28/2023   ACCESSION NUMBER(S): [id number]   ORDERING CLINICIAN: JADA BURRELL   TECHNIQUE: Sequential trans axial images were obtained  .   FINDINGS: INTRACRANIAL:   CORTICAL SULCI AND EXTRA-AXIAL SPACES:  Unremarkable.   VENTRICULAR SYSTEM:  Unremarkable without significant dilatation.   CEREBRAL PARENCHYMA:  There is mild hypodensity at the long tracks of the white matter, particularly in the frontoparietal regions greater on the left similar to the prior exam most likely due to gliosis from arteriolar disease.There is also a small area of encephalomalacia/gliosis at the left occipital lobe most likely from a remote infarction, also similar to the prior exam. Otherwisethere is no evidence of definite subacute infarction, intracranial hemorrhage or mass.   EXTRACRANIAL: Visualized paranasal sinuses and mastoids are clear. The calvarium is intact.       Mild age related degenerative change as described without acute findings or significant change from the prior exam. No intracranial hemorrhage.   MACRO: Gaston Beyer discussed the significance and urgency of this critical finding by secure chat with  JADA BURRELL on 10/19/2023 at 2:42 pm.  (**-RCF-**) Findings:  See findings.     Signed by: Gaston Beyer 10/19/2023 2:44 PM Dictation workstation:   [id number]        Assessment/Plan   Principal Problem:    Disorientation  Active  Problems:    Ambulatory dysfunction    Myelodysplastic syndrome, unspecified (CMS/HCC)    Acute cystitis without hematuria    Urinary incontinence    AMS (altered mental status)      Patient presents to this facility for confusion, headache, as well as progressive lower extremity weakness.  Broad differential at time of admission however significant work-up obtained at the time of this dictation.    Case was discussed with neurology service who have evaluated patient at bedside.  They note patient initially reported a bifrontal pressure sensation described as headache.  In addition to this, had endorsed to them proximal lower extremity weakness.  No focal deficits appreciated.  CT scan of the thoracic, cervical, and lumbar spine negative.  Laboratory studies including B12, TSH, ESR, CRP, inflammatory markers currently pending.  With her persistent episodes of urinary incontinence, will pursue MRI of the lumbar spine.  However suspect etiology more consistent with cystitis and lower extremity weakness the progression of her known chronic debilitation    Patient has been started on IV Rocephin.  We will continue at this time pending urine culture speciation and sensitivities    Fall precautions, PT/OT evaluation.  Patient endorses she is active with home health care and home physical therapy.  Would be amenable to continuing this.  Patient is less inclined to pursue placement options with a skilled facility.    Home medications reviewed and resumed as indicated     I spent >75 minutes in the professional and overall care of this patient.      Hay Bajwa, DO

## 2023-10-20 NOTE — CONSULTS
Consults    Reason For Consult  Progressive lower extremity weakness, thoracic compression fracture    History Of Present Illness  Bing Holliday is a 62 y.o. female presenting with significant frontal headache approximately 90 minutes prior to ED visit.  MRI lumbar spine with severe central stenosis at L3-4 as well as T7 compression fracture and neurosurgery placed on consult.  Additionally patient describes significant lower extremity weakness and she has been ambulating with a wheeled walker for multiple months.  Additionally she endorses episodes of urinary incontinence occurring mostly overnight.  She states that she is able to start and stop flow of urine on her own though at these times she has very little warning and she is unable to make it to the restroom in time.  She does endorse episodes of bowel incontinence occurring approximately 1 month interval.  She denies shortness of breath, chest pain, abdominal pain.  She does endorse bladder fullness.      Past Medical History  She has a past medical history of Acute upper respiratory infection, unspecified (03/04/2020), Acute upper respiratory infection, unspecified (09/30/2015), Arthritis, Body mass index (BMI) 23.0-23.9, adult (10/15/2021), Body mass index (BMI) 33.0-33.9, adult (03/04/2020), Cardiomegaly (08/27/2013), Chronic kidney disease, stage 3 unspecified (CMS/[facility]) (07/02/2013), Disease of pericardium, unspecified (07/02/2013), Encounter for follow-up examination after completed treatment for conditions other than malignant neoplasm (10/06/2022), Generalized contraction of visual field, right eye (01/29/2015), Homonymous bilateral field defects, right side (04/29/2016), Hypertensive chronic kidney disease with stage 1 through stage 4 chronic kidney disease, or unspecified chronic kidney disease (07/02/2013), Laceration without foreign body, left foot, initial encounter (07/03/2018), Migraine with aura, not intractable, without status migrainosus  (10/24/2022), Other conditions influencing health status (07/02/2013), Other conditions influencing health status (07/02/2013), Other conditions influencing health status (07/02/2013), Other conditions influencing health status (05/22/2015), Other conditions influencing health status (10/24/2022), Other conditions influencing health status (03/14/2022), Other long term (current) drug therapy (10/24/2022), Personal history of diseases of the blood and blood-forming organs and certain disorders involving the immune mechanism (07/02/2013), Personal history of diseases of the skin and subcutaneous tissue (08/11/2015), Personal history of nephrotic syndrome (07/02/2013), Personal history of other diseases of the circulatory system (08/27/2013), Personal history of other diseases of the nervous system and sense organs, Personal history of other diseases of the respiratory system, Personal history of other infectious and parasitic diseases (07/02/2013), Personal history of other specified conditions, Personal history of other specified conditions (08/27/2013), Puckering of macula, right eye (10/24/2022), Raynaud's syndrome without gangrene (07/02/2013), Systemic lupus erythematosus, unspecified (CMS/[facility]) (07/24/2015), Systemic lupus erythematosus, unspecified (CMS/[facility]) (07/24/2015), Systemic lupus erythematosus, unspecified (CMS/[facility]) (07/24/2015), Type 2 diabetes mellitus with diabetic nephropathy (CMS/[facility]) (07/02/2013), Type 2 diabetes mellitus with mild nonproliferative diabetic retinopathy without macular edema, left eye (CMS/[facility]) (07/27/2015), Type 2 diabetes mellitus with mild nonproliferative diabetic retinopathy without macular edema, unspecified eye (CMS/[facility]) (07/24/2015), Type 2 diabetes mellitus with proliferative diabetic retinopathy without macular edema, right eye (CMS/[facility]) (07/27/2015), Type 2 diabetes mellitus with proliferative diabetic retinopathy without macular edema, unspecified eye (CMS/[facility])  (07/24/2015), Unspecified acute and subacute iridocyclitis (07/24/2015), and Unspecified open wound, left foot, sequela (07/03/2018).    Surgical History  She has a past surgical history that includes Eye surgery (03/06/2015); Total hip arthroplasty (01/29/2015); Cholecystectomy (01/29/2015); Other surgical history (01/29/2015); Other surgical history (01/29/2015); Ankle surgery (01/29/2015); Foot surgery (01/29/2015); MR angio head wo IV contrast (7/26/2013); MR angio neck wo IV contrast (7/26/2013); CT guided percutaneous biopsy bone deep (5/4/2021); MR angio head wo IV contrast (9/17/2021); MR angio neck wo IV contrast (9/17/2021); MR angio neck wo IV contrast (3/25/2023); and MR angio head wo IV contrast (3/25/2023).     Social History  She reports that she has never smoked. She has never used smokeless tobacco. She reports that she does not drink alcohol and does not use drugs.    Family History  No family history on file.     Allergies  Ace inhibitors, Hydroxychloroquine, Lisinopril, Penicillins, and Sulfa (sulfonamide antibiotics)    Review of Systems  14/14 systems reviewed and negative other than what is listed in the history of present illness       Physical Exam  General: Well developed, awake/alert/oriented x3, no distress, alert and cooperative  Skin: Warm and dry, no lesions, no rashes  ENMT: Mucous membranes moist, no apparent injury, no lesions seen  Head/Neck: Neck Supple, no apparent injury  Respiratory/Thorax: Normal breath sounds with good chest expansion, thorax symmetric  Cardiovascular: No pitting edema, no JVD    Motor Strength: 3/5 bilateral hip flexion/extension, 4/5 knee flexion/extension, 5/5 ankle dorsi/plantarflexion  Muscle Bulk: Normal and symmetric in all extremities    Paraspinal muscle spasm/tenderness absent.   Midline tenderness present mid thoracic spine    Sensation: intact to light touch        Last Recorded Vitals  /78   Pulse 64   Temp 36.2 °C (97.2 °F)   Resp 16    Wt 94.6 kg (208 lb 8.9 oz)   SpO2 95%     Relevant Results  MR lumbar spine wo IV contrast    Result Date: 10/19/2023  Interpreted By:  Lo Judge, STUDY: MR LUMBAR SPINE WO IV CONTRAST;  10/19/2023 9:16 pm   INDICATION: Signs/Symptoms:incontinence new onset.   COMPARISON: CT lumbar spine 10/19/2023, MRI 05/05/2021   ACCESSION NUMBER(S): [id number]   ORDERING CLINICIAN: JADA BURRELL   TECHNIQUE: Sagittal T1, T2, STIR, axial T1 and T2 weighted images of the lumbar spine were acquired.   FINDINGS: Alignment: Reversal of the cervical curvature and dextroscoliosis centered about L3, similar to prior imaging. Minimal retrolisthesis of L3 on L4. Minimal retrolisthesis of L4 on L5.   Vertebrae/Intervertebral Discs: Multilevel chronic appearing vertebral body height loss most prominent at L2, L3 and L4 similar to prior imaging.Mild chronic endplate bone marrow signal changes. No significant bone marrow edema identified. Probable osseous demineralization. Multilevel intervertebral disc space narrowing and disc desiccation without significant intervertebral disc space edema.   Conus: The lower thoracic cord appears unremarkable. The conus terminates at L1.   T12-L1: Disc osteophyte complex and posterior ligamentous hypertrophy with facet arthropathy resulting in mild-to-moderate canal narrowing. Mild foraminal narrowing.   L1-2: Mild disc bulge and facet hypertrophy with moderate canal narrowing and moderate left lateral recess stenosis. Mild right and moderate left foraminal narrowing.   L2-3: Disc osteophyte complex and facet hypertrophy with moderate canal and moderate to severe lateral recess stenosis. Mild to moderate foraminal stenosis.   L3-4: Central to left paracentral disc osteophyte complex with retrolisthesis, ligamentous hypertrophy and facet arthropathy resulting in severe canal stenosis, similar are mildly progressed from prior imaging. Moderate right and severe left foraminal stenosis.   L4-5: Disc  bulge and posterior ligamentous hypertrophy with facet arthropathy resulting in at least moderate canal narrowing. Mild to moderate left and severe right foraminal stenosis.   L5-S1: Disc bulge and facet hypertrophy with mild canal narrowing and moderate left lateral recess stenosis. Moderate right and mild left foraminal narrowing.   The prevertebral and posterior paraspinous soft tissues are unremarkable. A probable left renal cyst.       Extensive degenerative changes with reversal of the lumbar lordosis and severe L3-L4 canal stenosis, similar to slightly progressed from 05/05/2021. Additional findings of foraminal and lateral recess narrowing as described.   No evidence of acute fracture or traumatic subluxation.   MACRO: None   Signed by: Lo Judge 10/19/2023 10:00 PM Dictation workstation:   [id number]    CT cervical spine wo IV contrast    Result Date: 10/19/2023  Interpreted By:  Mariia Concepcion, STUDY: CT CERVICAL SPINE WO IV CONTRAST; CT LUMBAR SPINE WO IV CONTRAST; CT THORACIC SPINE WO IV CONTRAST;  10/19/2023 5:07 pm   INDICATION: Signs/Symptoms:per neuro request; Signs/Symptoms:per neuro request for lower extremity weakness.   COMPARISON: None.   ACCESSION NUMBER(S): [id number]; [id number]; [id number]   ORDERING CLINICIAN: JADA BURRELL   TECHNIQUE: Axial CT images of the cervical spine are obtained. Axial, coronal and sagittal reconstructions are provided for review. Axial CT images of the thoracic spine are obtained. Axial, coronal and sagittal reconstructions are provided for review.Axial CT images of the lumbar spine are obtained. Axial, coronal and sagittal reconstructions are provided for review.   FINDINGS: Cervical spine: No acute fracture or subluxation. Reversal of normal cervical lordosis in the midcervical spine. Mild-moderate disc height loss seen at C3-C4, C4-C5, C5-C6, and C6-C7 with multilevel endplate erosions, greatest at C5-C6 and C6-C7. Multilevel marginal  osteophytes C3-C7. Multilevel facet arthropathy in the cervical spine. Moderate right-sided neural foraminal narrowing at C3-C4, severe on the left at C4-C5, mild on the left at C5-C6, and moderate bilaterally at C6-C7. No prevertebral hematoma.   Thoracic spine: Moderate compression deformity in T7 vertebral body, age indeterminate. Levocurvature in the lower thoracic spine. Scattered facet arthropathy. Multilevel vacuum phenomenon T9-T10, T10-T11, and T11-T12. Subcentimeter bone island in T1 vertebral body. There is dense material in the urinary system bilaterally suggestive of excreted contrast. Heavy coronary artery calcifications. Streaky opacities in the lungs dependently.   Lumbar spine: Multilevel advanced disc height loss L2-L3, L3-L4, and L4-L5 with endplate erosions and marginal osteophyte formation. Levoscoliosis of the lumbar spine. Facet arthropathy in the lumbar spine, greatest inferiorly. Multilevel spinal canal stenosis in the lower lumbar spine, at L2-L3 measuring 6 mm, at L3-L4 measuring 5 mm, and at L4-L5 measuring 4 mm. Bilateral neural foraminal narrowing is also seen at these levels. Partially visualized right hip arthroplasty with associated streak artifact. Sigmoid colonic diverticulosis. Atherosclerosis.       No acute osseous abnormality in the cervical spine. Multilevel degenerative change and neural foraminal narrowing.   Moderate age-indeterminate compression fracture T7 vertebral body.   No acute osseous abnormality in the lumbar spine. Multilevel severe canal stenosis as well as neural foraminal narrowing.   Signed by: Mariia Concepcion 10/19/2023 6:06 PM Dictation workstation:   [id number]    CT thoracic spine wo IV contrast    Result Date: 10/19/2023  Interpreted By:  Mariia Concepcion, STUDY: CT CERVICAL SPINE WO IV CONTRAST; CT LUMBAR SPINE WO IV CONTRAST; CT THORACIC SPINE WO IV CONTRAST;  10/19/2023 5:07 pm   INDICATION: Signs/Symptoms:per neuro request; Signs/Symptoms:per  neuro request for lower extremity weakness.   COMPARISON: None.   ACCESSION NUMBER(S): [id number]; [id number]; [id number]   ORDERING CLINICIAN: JADA BURRELL   TECHNIQUE: Axial CT images of the cervical spine are obtained. Axial, coronal and sagittal reconstructions are provided for review. Axial CT images of the thoracic spine are obtained. Axial, coronal and sagittal reconstructions are provided for review.Axial CT images of the lumbar spine are obtained. Axial, coronal and sagittal reconstructions are provided for review.   FINDINGS: Cervical spine: No acute fracture or subluxation. Reversal of normal cervical lordosis in the midcervical spine. Mild-moderate disc height loss seen at C3-C4, C4-C5, C5-C6, and C6-C7 with multilevel endplate erosions, greatest at C5-C6 and C6-C7. Multilevel marginal osteophytes C3-C7. Multilevel facet arthropathy in the cervical spine. Moderate right-sided neural foraminal narrowing at C3-C4, severe on the left at C4-C5, mild on the left at C5-C6, and moderate bilaterally at C6-C7. No prevertebral hematoma.   Thoracic spine: Moderate compression deformity in T7 vertebral body, age indeterminate. Levocurvature in the lower thoracic spine. Scattered facet arthropathy. Multilevel vacuum phenomenon T9-T10, T10-T11, and T11-T12. Subcentimeter bone island in T1 vertebral body. There is dense material in the urinary system bilaterally suggestive of excreted contrast. Heavy coronary artery calcifications. Streaky opacities in the lungs dependently.   Lumbar spine: Multilevel advanced disc height loss L2-L3, L3-L4, and L4-L5 with endplate erosions and marginal osteophyte formation. Levoscoliosis of the lumbar spine. Facet arthropathy in the lumbar spine, greatest inferiorly. Multilevel spinal canal stenosis in the lower lumbar spine, at L2-L3 measuring 6 mm, at L3-L4 measuring 5 mm, and at L4-L5 measuring 4 mm. Bilateral neural foraminal narrowing is also seen at these levels.  Partially visualized right hip arthroplasty with associated streak artifact. Sigmoid colonic diverticulosis. Atherosclerosis.       No acute osseous abnormality in the cervical spine. Multilevel degenerative change and neural foraminal narrowing.   Moderate age-indeterminate compression fracture T7 vertebral body.   No acute osseous abnormality in the lumbar spine. Multilevel severe canal stenosis as well as neural foraminal narrowing.   Signed by: Mariia Concepcion 10/19/2023 6:06 PM Dictation workstation:   [id number]    CT lumbar spine wo IV contrast    Result Date: 10/19/2023  Interpreted By:  Mariai Concepcion, STUDY: CT CERVICAL SPINE WO IV CONTRAST; CT LUMBAR SPINE WO IV CONTRAST; CT THORACIC SPINE WO IV CONTRAST;  10/19/2023 5:07 pm   INDICATION: Signs/Symptoms:per neuro request; Signs/Symptoms:per neuro request for lower extremity weakness.   COMPARISON: None.   ACCESSION NUMBER(S): [id number]; [id number]; [id number]   ORDERING CLINICIAN: JADA BURRELL   TECHNIQUE: Axial CT images of the cervical spine are obtained. Axial, coronal and sagittal reconstructions are provided for review. Axial CT images of the thoracic spine are obtained. Axial, coronal and sagittal reconstructions are provided for review.Axial CT images of the lumbar spine are obtained. Axial, coronal and sagittal reconstructions are provided for review.   FINDINGS: Cervical spine: No acute fracture or subluxation. Reversal of normal cervical lordosis in the midcervical spine. Mild-moderate disc height loss seen at C3-C4, C4-C5, C5-C6, and C6-C7 with multilevel endplate erosions, greatest at C5-C6 and C6-C7. Multilevel marginal osteophytes C3-C7. Multilevel facet arthropathy in the cervical spine. Moderate right-sided neural foraminal narrowing at C3-C4, severe on the left at C4-C5, mild on the left at C5-C6, and moderate bilaterally at C6-C7. No prevertebral hematoma.   Thoracic spine: Moderate compression deformity in T7 vertebral  body, age indeterminate. Levocurvature in the lower thoracic spine. Scattered facet arthropathy. Multilevel vacuum phenomenon T9-T10, T10-T11, and T11-T12. Subcentimeter bone island in T1 vertebral body. There is dense material in the urinary system bilaterally suggestive of excreted contrast. Heavy coronary artery calcifications. Streaky opacities in the lungs dependently.   Lumbar spine: Multilevel advanced disc height loss L2-L3, L3-L4, and L4-L5 with endplate erosions and marginal osteophyte formation. Levoscoliosis of the lumbar spine. Facet arthropathy in the lumbar spine, greatest inferiorly. Multilevel spinal canal stenosis in the lower lumbar spine, at L2-L3 measuring 6 mm, at L3-L4 measuring 5 mm, and at L4-L5 measuring 4 mm. Bilateral neural foraminal narrowing is also seen at these levels. Partially visualized right hip arthroplasty with associated streak artifact. Sigmoid colonic diverticulosis. Atherosclerosis.       No acute osseous abnormality in the cervical spine. Multilevel degenerative change and neural foraminal narrowing.   Moderate age-indeterminate compression fracture T7 vertebral body.   No acute osseous abnormality in the lumbar spine. Multilevel severe canal stenosis as well as neural foraminal narrowing.   Signed by: Mariia Concepcion 10/19/2023 6:06 PM Dictation workstation:   [id number]    XR chest 1 view    Result Date: 10/19/2023  Interpreted By:  Nic Moseley, STUDY: XR CHEST 1 VIEW  10/19/2023 3:11 pm   INDICATION: Signs/Symptoms:bat weakness   COMPARISON: 08/27/2023   ACCESSION NUMBER(S): [id number]   ORDERING CLINICIAN: JADA BURRELL   TECHNIQUE: A single AP portable radiograph of the chest was obtained.   FINDINGS: Multiple cardiac monitoring leads are seen over the chest.   No focal infiltrate, pleural effusion or pneumothorax is identified. The cardiac silhouette is prominent, similar to prior studies.       No focal infiltrate or pneumothorax is identified.   MACRO:  None.   Signed by: Nic Moseley 10/19/2023 3:13 PM Dictation workstation:   [id number]    CT angio brain attack head w and wo IV contrast and post procedure    Result Date: 10/19/2023  Interpreted By:  Jairo Moreno, STUDY: CT ANGIO BRAIN ATTACK HEAD W AND WO IV CONTRAST AND POST PROCEDURE; CT ANGIO BRAIN ATTACK NECK W IV CONTRAST AND POST PROCEDURE; 10/19/2023 2:57 pm   INDICATION: Signs/Symptoms:hemorrhagic stroke alert; Signs/Symptoms:bat.   COMPARISON: Head CT, same day   ACCESSION NUMBER(S): [id number]; [id number]   ORDERING CLINICIAN: JADA BURRELL   TECHNIQUE: 50 mL Omnipaque 350 was administered intravenously and axial images of the head and neck were acquired. Coronal and sagittal MIPs and 3-D reconstructions were created on an independent workstation and provided for review. Image quality is significantly degraded by poor bolus timing.   FINDINGS: CTA Head:   There is minimal arterial phase enhancement, no proximal large vessel occlusion in the [id number].   CTA Neck:   There is minimal arterial phase enhancement, no high-grade narrowing or occlusion of the major cervical arteries.   The soft tissues of the neck are unremarkable. The visualized lungs are clear. Dental caries with associated periapical lucency in the left maxilla. Degenerative changes in the spine and partially visualized glenohumeral joints.       Limited exam due to poor bolus timing, no large vessel occlusion in the head or neck.   MACRO: None   Signed by: Jairo Moreno 10/19/2023 3:08 PM Dictation workstation:   [id number]    CT angio brain attack neck w IV contrast and post procedure    Result Date: 10/19/2023  Interpreted By:  Jairo Moreno, STUDY: CT ANGIO BRAIN ATTACK HEAD W AND WO IV CONTRAST AND POST PROCEDURE; CT ANGIO BRAIN ATTACK NECK W IV CONTRAST AND POST PROCEDURE; 10/19/2023 2:57 pm   INDICATION: Signs/Symptoms:hemorrhagic stroke alert; Signs/Symptoms:bat.   COMPARISON: Head CT, same day   ACCESSION  NUMBER(S): [id number]; [id number]   ORDERING CLINICIAN: JADA BURRELL   TECHNIQUE: 50 mL Omnipaque 350 was administered intravenously and axial images of the head and neck were acquired. Coronal and sagittal MIPs and 3-D reconstructions were created on an independent workstation and provided for review. Image quality is significantly degraded by poor bolus timing.   FINDINGS: CTA Head:   There is minimal arterial phase enhancement, no proximal large vessel occlusion in the [id number].   CTA Neck:   There is minimal arterial phase enhancement, no high-grade narrowing or occlusion of the major cervical arteries.   The soft tissues of the neck are unremarkable. The visualized lungs are clear. Dental caries with associated periapical lucency in the left maxilla. Degenerative changes in the spine and partially visualized glenohumeral joints.       Limited exam due to poor bolus timing, no large vessel occlusion in the head or neck.   MACRO: None   Signed by: Jairo Moreno 10/19/2023 3:08 PM Dictation workstation:   [id number]    CT brain attack head wo IV contrast    Result Date: 10/19/2023  Interpreted By:  Gaston Beyer, STUDY: CT BRAIN ATTACK HEAD WO IV CONTRAST;  10/19/2023 2:38 pm   INDICATION: Signs/Symptoms:Stroke Evaluation.   COMPARISON: 08/28/2023   ACCESSION NUMBER(S): [id number]   ORDERING CLINICIAN: JADA BURRELL   TECHNIQUE: Sequential trans axial images were obtained  .   FINDINGS: INTRACRANIAL:   CORTICAL SULCI AND EXTRA-AXIAL SPACES:  Unremarkable.   VENTRICULAR SYSTEM:  Unremarkable without significant dilatation.   CEREBRAL PARENCHYMA:  There is mild hypodensity at the long tracks of the white matter, particularly in the frontoparietal regions greater on the left similar to the prior exam most likely due to gliosis from arteriolar disease.There is also a small area of encephalomalacia/gliosis at the left occipital lobe most likely from a remote infarction, also similar to the prior  exam. Otherwisethere is no evidence of definite subacute infarction, intracranial hemorrhage or mass.   EXTRACRANIAL: Visualized paranasal sinuses and mastoids are clear. The calvarium is intact.       Mild age related degenerative change as described without acute findings or significant change from the prior exam. No intracranial hemorrhage.   MACRO: Gaston Beyer discussed the significance and urgency of this critical finding by secure chat with  JADA BURRELL on 10/19/2023 at 2:42 pm.  (**-RCF-**) Findings:  See findings.     Signed by: Gaston Beyer 10/19/2023 2:44 PM Dictation workstation:   [id number]        Assessment/Plan     Lumbar stenosis with neurogenic claudication  Degenerative process increasing over the past few years  No acute neurosurgical intervention planned though short follow-up outpatient with Dr. Kearney recommended  Upright x-rays    T7 compression fracture  MRI thoracic spine recommended secondary to point tenderness on visit today (ED PA examined and patient denied at that time)  Brace if current STIR hyperintensity identified    Meir Moon PA-C  I reviewed the Advanced Practice Provider’s documentation and discussed the patient with the Advanced Practice Provider.  I agree with the Advanced Practice Provider’s medical decision making as documented in the note.      Claudio Kearney MD, FAANS, FACS  Board Certified Neurosurgeon  [facility]   of Neurological Surgery  [facility] [employer]  Office: [phone]  Fax: [phone]

## 2023-10-20 NOTE — PROGRESS NOTES
"Bing Holliday is a 62 y.o. female on day 0 of admission presenting with urinary incontinence and headache.    Subjective   Patient seen and examined  On phone with her son, NAD  Friendly cooperative, alert and oriented x3, LEWIS  Indicates has had constant nocturnal incontinence of urine at home but yesterday noticed daytime incontinence  Denies any bowel incontinence   States headache has resolved  Has no other chief complaints    Objective     Physical Exam  Constitutional:       Appearance: Normal appearance.   HENT:      Head: Normocephalic and atraumatic.      Nose: Nose normal.      Mouth/Throat:      Mouth: Mucous membranes are moist.   Eyes:      Extraocular Movements: Extraocular movements intact.      Conjunctiva/sclera: Conjunctivae normal.      Pupils: Pupils are equal, round, and reactive to light.   Cardiovascular:      Rate and Rhythm: Normal rate and regular rhythm.      Pulses: Normal pulses.      Heart sounds: Normal heart sounds.   Pulmonary:      Effort: Pulmonary effort is normal.      Breath sounds: Normal breath sounds.   Abdominal:      General: Bowel sounds are normal.      Palpations: Abdomen is soft.   Musculoskeletal:         General: Normal range of motion.      Cervical back: Normal range of motion and neck supple.   Skin:     General: Skin is warm and dry.   Neurological:      General: No focal deficit present.      Mental Status: She is alert. Mental status is at baseline.   Psychiatric:         Mood and Affect: Mood normal.         Behavior: Behavior normal.   Extremities: Contractures upper hands  Last Recorded Vitals  Blood pressure 113/62, pulse 70, temperature 36.1 °C (97 °F), resp. rate 16, height 1.803 m (5' 11\"), weight 94.6 kg (208 lb 8.9 oz), SpO2 97 %.  Intake/Output last 3 Shifts:  I/O last 3 completed shifts:  In: 800 (8.5 mL/kg) [P.O.:200; I.V.:600 (6.3 mL/kg)]  Out: - (0 mL/kg)   Weight: 94.6 kg     Relevant Results  Scheduled medications  amLODIPine, 2.5 mg, oral, " Daily  apixaban, 2.5 mg, oral, BID  atorvastatin, 40 mg, oral, Nightly  budesonide, 0.5 mg, nebulization, BID  cefTRIAXone, 1 g, intravenous, q24h  [START ON 10/21/2023] fludrocortisone, 0.1 mg, oral, Every other day  folic acid, 1 mg, oral, Daily  insulin lispro, 0-10 Units, subcutaneous, Before meals & nightly  ipratropium-albuteroL, 3 mL, nebulization, q6h  melatonin, 5 mg, oral, Nightly  multivitamin with minerals, 1 tablet, oral, Daily  mycophenolate, 500 mg, oral, BID  pantoprazole, 40 mg, oral, Daily before breakfast   Or  pantoprazole, 40 mg, intravenous, Daily before breakfast  predniSONE, 5 mg, oral, Daily  risperiDONE, 0.5 mg, oral, Nightly  torsemide, 10 mg, oral, Daily      Continuous medications  sodium chloride 0.9%, 100 mL/hr, Last Rate: 100 mL/hr (10/20/23 0848)      PRN medications  PRN medications: acetaminophen **OR** acetaminophen **OR** acetaminophen, acetaminophen **OR** acetaminophen **OR** acetaminophen, dextrose, glucagon, loperamide, melatonin, metoclopramide **OR** metoclopramide, ondansetron ODT **OR** ondansetron, polyethylene glycol  Results from last 7 days   Lab Units 10/20/23  0600 10/19/23  1455   WBC AUTO x10*3/uL 4.4 3.7*   RBC AUTO x10*6/uL 2.98* 3.20*   HEMOGLOBIN g/dL 8.5* 9.2*     Results from last 7 days   Lab Units 10/20/23  0600 10/19/23  1455   SODIUM mmol/L 143 141   POTASSIUM mmol/L 5.1 5.4*   CHLORIDE mmol/L 113* 113*   CO2 mmol/L 23 23   BUN mg/dL 56* 55*   CREATININE mg/dL 1.52* 1.47*   CALCIUM mg/dL 8.6 8.9   MAGNESIUM mg/dL  --  1.88   BILIRUBIN TOTAL mg/dL 0.2 0.2   ALT U/L 26 28   AST U/L 32 37       MR lumbar spine wo IV contrast    Result Date: 10/19/2023  Interpreted By:  Lo Judge, STUDY: MR LUMBAR SPINE WO IV CONTRAST;  10/19/2023 9:16 pm   INDICATION: Signs/Symptoms:incontinence new onset.   COMPARISON: CT lumbar spine 10/19/2023, MRI 05/05/2021   ACCESSION NUMBER(S): [id number]   ORDERING CLINICIAN: JADA BURRELL   TECHNIQUE: Sagittal T1, T2,  STIR, axial T1 and T2 weighted images of the lumbar spine were acquired.   FINDINGS: Alignment: Reversal of the cervical curvature and dextroscoliosis centered about L3, similar to prior imaging. Minimal retrolisthesis of L3 on L4. Minimal retrolisthesis of L4 on L5.   Vertebrae/Intervertebral Discs: Multilevel chronic appearing vertebral body height loss most prominent at L2, L3 and L4 similar to prior imaging.Mild chronic endplate bone marrow signal changes. No significant bone marrow edema identified. Probable osseous demineralization. Multilevel intervertebral disc space narrowing and disc desiccation without significant intervertebral disc space edema.   Conus: The lower thoracic cord appears unremarkable. The conus terminates at L1.   T12-L1: Disc osteophyte complex and posterior ligamentous hypertrophy with facet arthropathy resulting in mild-to-moderate canal narrowing. Mild foraminal narrowing.   L1-2: Mild disc bulge and facet hypertrophy with moderate canal narrowing and moderate left lateral recess stenosis. Mild right and moderate left foraminal narrowing.   L2-3: Disc osteophyte complex and facet hypertrophy with moderate canal and moderate to severe lateral recess stenosis. Mild to moderate foraminal stenosis.   L3-4: Central to left paracentral disc osteophyte complex with retrolisthesis, ligamentous hypertrophy and facet arthropathy resulting in severe canal stenosis, similar are mildly progressed from prior imaging. Moderate right and severe left foraminal stenosis.   L4-5: Disc bulge and posterior ligamentous hypertrophy with facet arthropathy resulting in at least moderate canal narrowing. Mild to moderate left and severe right foraminal stenosis.   L5-S1: Disc bulge and facet hypertrophy with mild canal narrowing and moderate left lateral recess stenosis. Moderate right and mild left foraminal narrowing.   The prevertebral and posterior paraspinous soft tissues are unremarkable. A probable left  renal cyst.       Extensive degenerative changes with reversal of the lumbar lordosis and severe L3-L4 canal stenosis, similar to slightly progressed from 05/05/2021. Additional findings of foraminal and lateral recess narrowing as described.   No evidence of acute fracture or traumatic subluxation.   MACRO: None   Signed by: Lo Judge 10/19/2023 10:00 PM Dictation workstation:   [id number]    CT cervical spine wo IV contrast    Result Date: 10/19/2023  Interpreted By:  Mariia Concepcion, STUDY: CT CERVICAL SPINE WO IV CONTRAST; CT LUMBAR SPINE WO IV CONTRAST; CT THORACIC SPINE WO IV CONTRAST;  10/19/2023 5:07 pm   INDICATION: Signs/Symptoms:per neuro request; Signs/Symptoms:per neuro request for lower extremity weakness.   COMPARISON: None.   ACCESSION NUMBER(S): [id number]; [id number]; [id number]   ORDERING CLINICIAN: JADA BURRELL   TECHNIQUE: Axial CT images of the cervical spine are obtained. Axial, coronal and sagittal reconstructions are provided for review. Axial CT images of the thoracic spine are obtained. Axial, coronal and sagittal reconstructions are provided for review.Axial CT images of the lumbar spine are obtained. Axial, coronal and sagittal reconstructions are provided for review.   FINDINGS: Cervical spine: No acute fracture or subluxation. Reversal of normal cervical lordosis in the midcervical spine. Mild-moderate disc height loss seen at C3-C4, C4-C5, C5-C6, and C6-C7 with multilevel endplate erosions, greatest at C5-C6 and C6-C7. Multilevel marginal osteophytes C3-C7. Multilevel facet arthropathy in the cervical spine. Moderate right-sided neural foraminal narrowing at C3-C4, severe on the left at C4-C5, mild on the left at C5-C6, and moderate bilaterally at C6-C7. No prevertebral hematoma.   Thoracic spine: Moderate compression deformity in T7 vertebral body, age indeterminate. Levocurvature in the lower thoracic spine. Scattered facet arthropathy. Multilevel vacuum phenomenon  T9-T10, T10-T11, and T11-T12. Subcentimeter bone island in T1 vertebral body. There is dense material in the urinary system bilaterally suggestive of excreted contrast. Heavy coronary artery calcifications. Streaky opacities in the lungs dependently.   Lumbar spine: Multilevel advanced disc height loss L2-L3, L3-L4, and L4-L5 with endplate erosions and marginal osteophyte formation. Levoscoliosis of the lumbar spine. Facet arthropathy in the lumbar spine, greatest inferiorly. Multilevel spinal canal stenosis in the lower lumbar spine, at L2-L3 measuring 6 mm, at L3-L4 measuring 5 mm, and at L4-L5 measuring 4 mm. Bilateral neural foraminal narrowing is also seen at these levels. Partially visualized right hip arthroplasty with associated streak artifact. Sigmoid colonic diverticulosis. Atherosclerosis.       No acute osseous abnormality in the cervical spine. Multilevel degenerative change and neural foraminal narrowing.   Moderate age-indeterminate compression fracture T7 vertebral body.   No acute osseous abnormality in the lumbar spine. Multilevel severe canal stenosis as well as neural foraminal narrowing.   Signed by: Mariia Concepcion 10/19/2023 6:06 PM Dictation workstation:   [id number]    CT thoracic spine wo IV contrast    Result Date: 10/19/2023  Interpreted By:  Mariia Concepcion, STUDY: CT CERVICAL SPINE WO IV CONTRAST; CT LUMBAR SPINE WO IV CONTRAST; CT THORACIC SPINE WO IV CONTRAST;  10/19/2023 5:07 pm   INDICATION: Signs/Symptoms:per neuro request; Signs/Symptoms:per neuro request for lower extremity weakness.   COMPARISON: None.   ACCESSION NUMBER(S): [id number]; [id number]; [id number]   ORDERING CLINICIAN: JADA BURRELL   TECHNIQUE: Axial CT images of the cervical spine are obtained. Axial, coronal and sagittal reconstructions are provided for review. Axial CT images of the thoracic spine are obtained. Axial, coronal and sagittal reconstructions are provided for review.Axial CT images of  the lumbar spine are obtained. Axial, coronal and sagittal reconstructions are provided for review.   FINDINGS: Cervical spine: No acute fracture or subluxation. Reversal of normal cervical lordosis in the midcervical spine. Mild-moderate disc height loss seen at C3-C4, C4-C5, C5-C6, and C6-C7 with multilevel endplate erosions, greatest at C5-C6 and C6-C7. Multilevel marginal osteophytes C3-C7. Multilevel facet arthropathy in the cervical spine. Moderate right-sided neural foraminal narrowing at C3-C4, severe on the left at C4-C5, mild on the left at C5-C6, and moderate bilaterally at C6-C7. No prevertebral hematoma.   Thoracic spine: Moderate compression deformity in T7 vertebral body, age indeterminate. Levocurvature in the lower thoracic spine. Scattered facet arthropathy. Multilevel vacuum phenomenon T9-T10, T10-T11, and T11-T12. Subcentimeter bone island in T1 vertebral body. There is dense material in the urinary system bilaterally suggestive of excreted contrast. Heavy coronary artery calcifications. Streaky opacities in the lungs dependently.   Lumbar spine: Multilevel advanced disc height loss L2-L3, L3-L4, and L4-L5 with endplate erosions and marginal osteophyte formation. Levoscoliosis of the lumbar spine. Facet arthropathy in the lumbar spine, greatest inferiorly. Multilevel spinal canal stenosis in the lower lumbar spine, at L2-L3 measuring 6 mm, at L3-L4 measuring 5 mm, and at L4-L5 measuring 4 mm. Bilateral neural foraminal narrowing is also seen at these levels. Partially visualized right hip arthroplasty with associated streak artifact. Sigmoid colonic diverticulosis. Atherosclerosis.       No acute osseous abnormality in the cervical spine. Multilevel degenerative change and neural foraminal narrowing.   Moderate age-indeterminate compression fracture T7 vertebral body.   No acute osseous abnormality in the lumbar spine. Multilevel severe canal stenosis as well as neural foraminal narrowing.    Signed by: Mariia Concepcion 10/19/2023 6:06 PM Dictation workstation:   [id number]    CT lumbar spine wo IV contrast    Result Date: 10/19/2023  Interpreted By:  Mariia Concepcion, STUDY: CT CERVICAL SPINE WO IV CONTRAST; CT LUMBAR SPINE WO IV CONTRAST; CT THORACIC SPINE WO IV CONTRAST;  10/19/2023 5:07 pm   INDICATION: Signs/Symptoms:per neuro request; Signs/Symptoms:per neuro request for lower extremity weakness.   COMPARISON: None.   ACCESSION NUMBER(S): [id number]; [id number]; [id number]   ORDERING CLINICIAN: JADA BURRELL   TECHNIQUE: Axial CT images of the cervical spine are obtained. Axial, coronal and sagittal reconstructions are provided for review. Axial CT images of the thoracic spine are obtained. Axial, coronal and sagittal reconstructions are provided for review.Axial CT images of the lumbar spine are obtained. Axial, coronal and sagittal reconstructions are provided for review.   FINDINGS: Cervical spine: No acute fracture or subluxation. Reversal of normal cervical lordosis in the midcervical spine. Mild-moderate disc height loss seen at C3-C4, C4-C5, C5-C6, and C6-C7 with multilevel endplate erosions, greatest at C5-C6 and C6-C7. Multilevel marginal osteophytes C3-C7. Multilevel facet arthropathy in the cervical spine. Moderate right-sided neural foraminal narrowing at C3-C4, severe on the left at C4-C5, mild on the left at C5-C6, and moderate bilaterally at C6-C7. No prevertebral hematoma.   Thoracic spine: Moderate compression deformity in T7 vertebral body, age indeterminate. Levocurvature in the lower thoracic spine. Scattered facet arthropathy. Multilevel vacuum phenomenon T9-T10, T10-T11, and T11-T12. Subcentimeter bone island in T1 vertebral body. There is dense material in the urinary system bilaterally suggestive of excreted contrast. Heavy coronary artery calcifications. Streaky opacities in the lungs dependently.   Lumbar spine: Multilevel advanced disc height loss L2-L3, L3-L4,  and L4-L5 with endplate erosions and marginal osteophyte formation. Levoscoliosis of the lumbar spine. Facet arthropathy in the lumbar spine, greatest inferiorly. Multilevel spinal canal stenosis in the lower lumbar spine, at L2-L3 measuring 6 mm, at L3-L4 measuring 5 mm, and at L4-L5 measuring 4 mm. Bilateral neural foraminal narrowing is also seen at these levels. Partially visualized right hip arthroplasty with associated streak artifact. Sigmoid colonic diverticulosis. Atherosclerosis.       No acute osseous abnormality in the cervical spine. Multilevel degenerative change and neural foraminal narrowing.   Moderate age-indeterminate compression fracture T7 vertebral body.   No acute osseous abnormality in the lumbar spine. Multilevel severe canal stenosis as well as neural foraminal narrowing.   Signed by: Mariia Concepcion 10/19/2023 6:06 PM Dictation workstation:   [id number]    XR chest 1 view    Result Date: 10/19/2023  Interpreted By:  Nic Moseley, STUDY: XR CHEST 1 VIEW  10/19/2023 3:11 pm   INDICATION: Signs/Symptoms:bat weakness   COMPARISON: 08/27/2023   ACCESSION NUMBER(S): [id number]   ORDERING CLINICIAN: JADA BURRELL   TECHNIQUE: A single AP portable radiograph of the chest was obtained.   FINDINGS: Multiple cardiac monitoring leads are seen over the chest.   No focal infiltrate, pleural effusion or pneumothorax is identified. The cardiac silhouette is prominent, similar to prior studies.       No focal infiltrate or pneumothorax is identified.   MACRO: None.   Signed by: Nic Moseley 10/19/2023 3:13 PM Dictation workstation:   [id number]    CT angio brain attack head w and wo IV contrast and post procedure    Result Date: 10/19/2023  Interpreted By:  Jairo Moreno, STUDY: CT ANGIO BRAIN ATTACK HEAD W AND WO IV CONTRAST AND POST PROCEDURE; CT ANGIO BRAIN ATTACK NECK W IV CONTRAST AND POST PROCEDURE; 10/19/2023 2:57 pm   INDICATION: Signs/Symptoms:hemorrhagic stroke alert;  Signs/Symptoms:bat.   COMPARISON: Head CT, same day   ACCESSION NUMBER(S): [id number]; [id number]   ORDERING CLINICIAN: JADA BURRELL   TECHNIQUE: 50 mL Omnipaque 350 was administered intravenously and axial images of the head and neck were acquired. Coronal and sagittal MIPs and 3-D reconstructions were created on an independent workstation and provided for review. Image quality is significantly degraded by poor bolus timing.   FINDINGS: CTA Head:   There is minimal arterial phase enhancement, no proximal large vessel occlusion in the cnfxss-pe-Pbyapn.   CTA Neck:   There is minimal arterial phase enhancement, no high-grade narrowing or occlusion of the major cervical arteries.   The soft tissues of the neck are unremarkable. The visualized lungs are clear. Dental caries with associated periapical lucency in the left maxilla. Degenerative changes in the spine and partially visualized glenohumeral joints.       Limited exam due to poor bolus timing, no large vessel occlusion in the head or neck.   MACRO: None   Signed by: Jairo Moreno 10/19/2023 3:08 PM Dictation workstation:   [id number]    CT angio brain attack neck w IV contrast and post procedure    Result Date: 10/19/2023  Interpreted By:  Jairo Moreno, STUDY: CT ANGIO BRAIN ATTACK HEAD W AND WO IV CONTRAST AND POST PROCEDURE; CT ANGIO BRAIN ATTACK NECK W IV CONTRAST AND POST PROCEDURE; 10/19/2023 2:57 pm   INDICATION: Signs/Symptoms:hemorrhagic stroke alert; Signs/Symptoms:bat.   COMPARISON: Head CT, same day   ACCESSION NUMBER(S): [id number]; [id number]   ORDERING CLINICIAN: JADA BURRELL   TECHNIQUE: 50 mL Omnipaque 350 was administered intravenously and axial images of the head and neck were acquired. Coronal and sagittal MIPs and 3-D reconstructions were created on an independent workstation and provided for review. Image quality is significantly degraded by poor bolus timing.   FINDINGS: CTA Head:   There is minimal arterial phase  enhancement, no proximal large vessel occlusion in the [id number]-bj-Oaphbg.   CTA Neck:   There is minimal arterial phase enhancement, no high-grade narrowing or occlusion of the major cervical arteries.   The soft tissues of the neck are unremarkable. The visualized lungs are clear. Dental caries with associated periapical lucency in the left maxilla. Degenerative changes in the spine and partially visualized glenohumeral joints.       Limited exam due to poor bolus timing, no large vessel occlusion in the head or neck.   MACRO: None   Signed by: Jairo Moreno 10/19/2023 3:08 PM Dictation workstation:   [id number]    CT brain attack head wo IV contrast    Result Date: 10/19/2023  Interpreted By:  Gaston Beyer, STUDY: CT BRAIN ATTACK HEAD WO IV CONTRAST;  10/19/2023 2:38 pm   INDICATION: Signs/Symptoms:Stroke Evaluation.   COMPARISON: 08/28/2023   ACCESSION NUMBER(S): [id number]   ORDERING CLINICIAN: JADA BURRELL   TECHNIQUE: Sequential trans axial images were obtained  .   FINDINGS: INTRACRANIAL:   CORTICAL SULCI AND EXTRA-AXIAL SPACES:  Unremarkable.   VENTRICULAR SYSTEM:  Unremarkable without significant dilatation.   CEREBRAL PARENCHYMA:  There is mild hypodensity at the long tracks of the white matter, particularly in the frontoparietal regions greater on the left similar to the prior exam most likely due to gliosis from arteriolar disease.There is also a small area of encephalomalacia/gliosis at the left occipital lobe most likely from a remote infarction, also similar to the prior exam. Otherwisethere is no evidence of definite subacute infarction, intracranial hemorrhage or mass.   EXTRACRANIAL: Visualized paranasal sinuses and mastoids are clear. The calvarium is intact.       Mild age related degenerative change as described without acute findings or significant change from the prior exam. No intracranial hemorrhage.   MACRO: Gaston Beyer discussed the significance and urgency of this critical finding  by secure chat with  JADA ASHANTI on 10/19/2023 at 2:42 pm.  (**-RCF-**) Findings:  See findings.     Signed by: Gaston Beyer 10/19/2023 2:44 PM Dictation workstation:   [id number]       Assessment/Plan   Principal Problem:    Disorientation  Active Problems:    Ambulatory dysfunction    Myelodysplastic syndrome, unspecified (CMS/HCC)    Acute cystitis without hematuria    Urinary incontinence    AMS (altered mental status)    Weakness of both lower extremities      Plan:    Headache resolved  Incontinent of urine  Cont  IV Rocephin  Follow up  on urine culture  Cr at or near patient baseline  Neurology consulted   MRI of the lumbar spine.   secondary to severe spinal stenosis in the lumbar region -Compression Fracture   Consult neuro surgery_ Dr. Kearney-T7 compression fracture  MRI thoracic spine ordered and pending completion  PT OT consulted-patient states she ants home with resumed-Patient is less inclined to pursue placement options with a skilled facility.   Home medications resumed  Am labs including cbc, bmp, mag  Dispo: follow NS recommendations  I spent >30 minutes in the professional and overall care of this patient.    Angela Cote CNP  [facility]  [id number] [place] [address]  Phone: [phone] Fax: [phone]  JIM Lamb-ROSA ISELA

## 2023-10-21 ENCOUNTER — APPOINTMENT (OUTPATIENT)
Dept: INFUSION THERAPY | Facility: CLINIC | Age: 63
End: 2023-10-21
Payer: MEDICARE

## 2023-10-21 ENCOUNTER — APPOINTMENT (OUTPATIENT)
Dept: CARDIOLOGY | Facility: HOSPITAL | Age: 63
DRG: 551 | End: 2023-10-21
Payer: MEDICARE

## 2023-10-21 ENCOUNTER — APPOINTMENT (OUTPATIENT)
Dept: RADIOLOGY | Facility: HOSPITAL | Age: 63
DRG: 551 | End: 2023-10-21
Payer: MEDICARE

## 2023-10-21 LAB
ANION GAP SERPL CALC-SCNC: 11 MMOL/L (ref 10–20)
BACTERIA UR CULT: ABNORMAL
BUN SERPL-MCNC: 54 MG/DL (ref 6–23)
CALCIUM SERPL-MCNC: 8.6 MG/DL (ref 8.6–10.3)
CHLORIDE SERPL-SCNC: 110 MMOL/L (ref 98–107)
CK MB CFR SERPL CALC: NORMAL %
CK MB SERPL-CCNC: 2.5 NG/ML
CO2 SERPL-SCNC: 27 MMOL/L (ref 21–32)
CREAT SERPL-MCNC: 1.84 MG/DL (ref 0.5–1.05)
ERYTHROCYTE [DISTWIDTH] IN BLOOD BY AUTOMATED COUNT: 18.2 % (ref 11.5–14.5)
GFR SERPL CREATININE-BSD FRML MDRD: 31 ML/MIN/1.73M*2
GLUCOSE BLD MANUAL STRIP-MCNC: 102 MG/DL (ref 74–99)
GLUCOSE BLD MANUAL STRIP-MCNC: 115 MG/DL (ref 74–99)
GLUCOSE BLD MANUAL STRIP-MCNC: 144 MG/DL (ref 74–99)
GLUCOSE BLD MANUAL STRIP-MCNC: 164 MG/DL (ref 74–99)
GLUCOSE BLD MANUAL STRIP-MCNC: 54 MG/DL (ref 74–99)
GLUCOSE BLD MANUAL STRIP-MCNC: 67 MG/DL (ref 74–99)
GLUCOSE BLD MANUAL STRIP-MCNC: 67 MG/DL (ref 74–99)
GLUCOSE BLD MANUAL STRIP-MCNC: 90 MG/DL (ref 74–99)
GLUCOSE SERPL-MCNC: 40 MG/DL (ref 74–99)
HCT VFR BLD AUTO: 29.4 % (ref 36–46)
HGB BLD-MCNC: 8.7 G/DL (ref 12–16)
MAGNESIUM SERPL-MCNC: 1.76 MG/DL (ref 1.6–2.4)
MCH RBC QN AUTO: 28.8 PG (ref 26–34)
MCHC RBC AUTO-ENTMCNC: 29.6 G/DL (ref 32–36)
MCV RBC AUTO: 97 FL (ref 80–100)
NRBC BLD-RTO: 0 /100 WBCS (ref 0–0)
PLATELET # BLD AUTO: 98 X10*3/UL (ref 150–450)
PMV BLD AUTO: 13.2 FL (ref 7.5–11.5)
POTASSIUM SERPL-SCNC: 4.9 MMOL/L (ref 3.5–5.3)
RBC # BLD AUTO: 3.02 X10*6/UL (ref 4–5.2)
SODIUM SERPL-SCNC: 143 MMOL/L (ref 136–145)
WBC # BLD AUTO: 3.9 X10*3/UL (ref 4.4–11.3)

## 2023-10-21 PROCEDURE — 36415 COLL VENOUS BLD VENIPUNCTURE: CPT | Performed by: NURSE PRACTITIONER

## 2023-10-21 PROCEDURE — 2500000005 HC RX 250 GENERAL PHARMACY W/O HCPCS: Performed by: STUDENT IN AN ORGANIZED HEALTH CARE EDUCATION/TRAINING PROGRAM

## 2023-10-21 PROCEDURE — 99232 SBSQ HOSP IP/OBS MODERATE 35: CPT | Performed by: PSYCHIATRY & NEUROLOGY

## 2023-10-21 PROCEDURE — 83735 ASSAY OF MAGNESIUM: CPT | Performed by: NURSE PRACTITIONER

## 2023-10-21 PROCEDURE — 93922 UPR/L XTREMITY ART 2 LEVELS: CPT | Performed by: INTERNAL MEDICINE

## 2023-10-21 PROCEDURE — 85027 COMPLETE CBC AUTOMATED: CPT | Performed by: NURSE PRACTITIONER

## 2023-10-21 PROCEDURE — 2500000004 HC RX 250 GENERAL PHARMACY W/ HCPCS (ALT 636 FOR OP/ED): Performed by: INTERNAL MEDICINE

## 2023-10-21 PROCEDURE — 82947 ASSAY GLUCOSE BLOOD QUANT: CPT

## 2023-10-21 PROCEDURE — 93922 UPR/L XTREMITY ART 2 LEVELS: CPT

## 2023-10-21 PROCEDURE — G0378 HOSPITAL OBSERVATION PER HR: HCPCS

## 2023-10-21 PROCEDURE — 73660 X-RAY EXAM OF TOE(S): CPT | Mod: LEFT SIDE | Performed by: STUDENT IN AN ORGANIZED HEALTH CARE EDUCATION/TRAINING PROGRAM

## 2023-10-21 PROCEDURE — 1200000002 HC GENERAL ROOM WITH TELEMETRY DAILY

## 2023-10-21 PROCEDURE — 2500000004 HC RX 250 GENERAL PHARMACY W/ HCPCS (ALT 636 FOR OP/ED): Performed by: NURSE PRACTITIONER

## 2023-10-21 PROCEDURE — 94640 AIRWAY INHALATION TREATMENT: CPT

## 2023-10-21 PROCEDURE — 73660 X-RAY EXAM OF TOE(S): CPT | Mod: LT,FY

## 2023-10-21 PROCEDURE — 2500000005 HC RX 250 GENERAL PHARMACY W/O HCPCS: Performed by: INTERNAL MEDICINE

## 2023-10-21 PROCEDURE — 80048 BASIC METABOLIC PNL TOTAL CA: CPT | Performed by: NURSE PRACTITIONER

## 2023-10-21 PROCEDURE — 2500000002 HC RX 250 W HCPCS SELF ADMINISTERED DRUGS (ALT 637 FOR MEDICARE OP, ALT 636 FOR OP/ED): Performed by: NURSE PRACTITIONER

## 2023-10-21 PROCEDURE — 2500000001 HC RX 250 WO HCPCS SELF ADMINISTERED DRUGS (ALT 637 FOR MEDICARE OP): Performed by: NURSE PRACTITIONER

## 2023-10-21 PROCEDURE — 99233 SBSQ HOSP IP/OBS HIGH 50: CPT | Performed by: STUDENT IN AN ORGANIZED HEALTH CARE EDUCATION/TRAINING PROGRAM

## 2023-10-21 PROCEDURE — 2500000002 HC RX 250 W HCPCS SELF ADMINISTERED DRUGS (ALT 637 FOR MEDICARE OP, ALT 636 FOR OP/ED): Performed by: STUDENT IN AN ORGANIZED HEALTH CARE EDUCATION/TRAINING PROGRAM

## 2023-10-21 RX ORDER — ACETAMINOPHEN 325 MG/1
975 TABLET ORAL EVERY 8 HOURS
Status: DISCONTINUED | OUTPATIENT
Start: 2023-10-21 | End: 2023-10-25 | Stop reason: HOSPADM

## 2023-10-21 RX ORDER — LIDOCAINE 560 MG/1
1 PATCH PERCUTANEOUS; TOPICAL; TRANSDERMAL DAILY
Status: DISCONTINUED | OUTPATIENT
Start: 2023-10-21 | End: 2023-10-25 | Stop reason: HOSPADM

## 2023-10-21 RX ORDER — IPRATROPIUM BROMIDE AND ALBUTEROL SULFATE 2.5; .5 MG/3ML; MG/3ML
3 SOLUTION RESPIRATORY (INHALATION)
Status: DISCONTINUED | OUTPATIENT
Start: 2023-10-21 | End: 2023-10-25 | Stop reason: HOSPADM

## 2023-10-21 RX ORDER — OXYCODONE HYDROCHLORIDE 5 MG/1
5 TABLET ORAL EVERY 6 HOURS PRN
Status: DISCONTINUED | OUTPATIENT
Start: 2023-10-21 | End: 2023-10-25 | Stop reason: HOSPADM

## 2023-10-21 RX ADMIN — ACETAMINOPHEN 975 MG: 325 TABLET ORAL at 10:30

## 2023-10-21 RX ADMIN — CEFTRIAXONE SODIUM 1 G: 1 INJECTION, SOLUTION INTRAVENOUS at 21:22

## 2023-10-21 RX ADMIN — IPRATROPIUM BROMIDE AND ALBUTEROL SULFATE 3 ML: 2.5; .5 SOLUTION RESPIRATORY (INHALATION) at 07:52

## 2023-10-21 RX ADMIN — DEXTROSE MONOHYDRATE 25 G: 25 INJECTION, SOLUTION INTRAVENOUS at 08:07

## 2023-10-21 RX ADMIN — FLUDROCORTISONE ACETATE 0.1 MG: 0.1 TABLET ORAL at 08:11

## 2023-10-21 RX ADMIN — MYCOPHENOLATE MOFETIL 500 MG: 250 CAPSULE ORAL at 21:22

## 2023-10-21 RX ADMIN — DEXTROSE MONOHYDRATE 25 G: 25 INJECTION, SOLUTION INTRAVENOUS at 02:00

## 2023-10-21 RX ADMIN — LIDOCAINE 1 PATCH: 4 PATCH TOPICAL at 10:30

## 2023-10-21 RX ADMIN — SODIUM CHLORIDE 100 ML/HR: 9 INJECTION, SOLUTION INTRAVENOUS at 04:20

## 2023-10-21 RX ADMIN — APIXABAN 2.5 MG: 2.5 TABLET, FILM COATED ORAL at 21:22

## 2023-10-21 RX ADMIN — Medication 5 MG: at 21:22

## 2023-10-21 RX ADMIN — AMLODIPINE BESYLATE 2.5 MG: 2.5 TABLET ORAL at 08:10

## 2023-10-21 RX ADMIN — PREDNISONE 5 MG: 5 TABLET ORAL at 08:09

## 2023-10-21 RX ADMIN — IPRATROPIUM BROMIDE AND ALBUTEROL SULFATE 3 ML: 2.5; .5 SOLUTION RESPIRATORY (INHALATION) at 20:24

## 2023-10-21 RX ADMIN — BUDESONIDE 0.5 MG: 0.5 INHALANT RESPIRATORY (INHALATION) at 07:53

## 2023-10-21 RX ADMIN — ATORVASTATIN CALCIUM 40 MG: 40 TABLET, FILM COATED ORAL at 21:22

## 2023-10-21 RX ADMIN — RISPERIDONE 0.5 MG: 0.5 TABLET ORAL at 21:23

## 2023-10-21 RX ADMIN — MYCOPHENOLATE MOFETIL 500 MG: 250 CAPSULE ORAL at 08:09

## 2023-10-21 RX ADMIN — MULTIPLE VITAMINS W/ MINERALS TAB 1 TABLET: TAB at 08:09

## 2023-10-21 RX ADMIN — ACETAMINOPHEN 975 MG: 325 TABLET ORAL at 18:46

## 2023-10-21 RX ADMIN — FOLIC ACID 1 MG: 1 TABLET ORAL at 08:10

## 2023-10-21 RX ADMIN — IPRATROPIUM BROMIDE AND ALBUTEROL SULFATE 3 ML: 2.5; .5 SOLUTION RESPIRATORY (INHALATION) at 14:19

## 2023-10-21 RX ADMIN — TORSEMIDE 10 MG: 20 TABLET ORAL at 08:09

## 2023-10-21 RX ADMIN — PANTOPRAZOLE SODIUM 40 MG: 40 TABLET, DELAYED RELEASE ORAL at 07:00

## 2023-10-21 RX ADMIN — APIXABAN 2.5 MG: 2.5 TABLET, FILM COATED ORAL at 08:10

## 2023-10-21 RX ADMIN — BUDESONIDE 0.5 MG: 0.5 INHALANT RESPIRATORY (INHALATION) at 20:24

## 2023-10-21 ASSESSMENT — PAIN SCALES - GENERAL
PAINLEVEL_OUTOF10: 0 - NO PAIN

## 2023-10-21 ASSESSMENT — COGNITIVE AND FUNCTIONAL STATUS - GENERAL
STANDING UP FROM CHAIR USING ARMS: A LITTLE
TURNING FROM BACK TO SIDE WHILE IN FLAT BAD: A LITTLE
TURNING FROM BACK TO SIDE WHILE IN FLAT BAD: A LITTLE
EATING MEALS: A LITTLE
HELP NEEDED FOR BATHING: A LITTLE
EATING MEALS: A LITTLE
DRESSING REGULAR UPPER BODY CLOTHING: A LITTLE
DRESSING REGULAR LOWER BODY CLOTHING: A LITTLE
PERSONAL GROOMING: A LITTLE
TOILETING: A LITTLE
DRESSING REGULAR LOWER BODY CLOTHING: A LITTLE
DAILY ACTIVITIY SCORE: 18
WALKING IN HOSPITAL ROOM: A LITTLE
HELP NEEDED FOR BATHING: A LITTLE
WALKING IN HOSPITAL ROOM: A LITTLE
MOBILITY SCORE: 18
MOVING TO AND FROM BED TO CHAIR: A LITTLE
MOVING TO AND FROM BED TO CHAIR: A LITTLE
CLIMB 3 TO 5 STEPS WITH RAILING: A LITTLE
STANDING UP FROM CHAIR USING ARMS: A LITTLE
MOBILITY SCORE: 18
MOVING FROM LYING ON BACK TO SITTING ON SIDE OF FLAT BED WITH BEDRAILS: A LITTLE
DRESSING REGULAR UPPER BODY CLOTHING: A LITTLE
DAILY ACTIVITIY SCORE: 18
MOVING FROM LYING ON BACK TO SITTING ON SIDE OF FLAT BED WITH BEDRAILS: A LITTLE
PERSONAL GROOMING: A LITTLE
CLIMB 3 TO 5 STEPS WITH RAILING: A LITTLE
TOILETING: A LITTLE

## 2023-10-21 ASSESSMENT — PAIN - FUNCTIONAL ASSESSMENT
PAIN_FUNCTIONAL_ASSESSMENT: 0-10

## 2023-10-21 NOTE — CARE PLAN
Problem: Fall/Injury  Goal: Not fall by end of shift  Outcome: Progressing     Problem: Skin  Goal: Decreased wound size/increased tissue granulation at next dressing change  Outcome: Progressing  Goal: Participates in plan/prevention/treatment measures  Outcome: Progressing  Goal: Prevent/manage excess moisture  Outcome: Progressing  Goal: Prevent/minimize sheer/friction injuries  Outcome: Progressing  Goal: Promote/optimize nutrition  Outcome: Progressing  Goal: Promote skin healing  Outcome: Progressing

## 2023-10-21 NOTE — PROGRESS NOTES
Pt was hypoglycemic at 0138. She was 67 for her blood glucose level. Repeat after intervention was 67 and then 164 after D5 injection. She reportedly does this every day and she had a normal food intake and no insulin administration recorded yesterday. Asymptomatic. Pt laid in bed in no distress. Hospitalist notified. Call light within reach and fall precautions in place. No other needs at this time per pt.

## 2023-10-21 NOTE — PROGRESS NOTES
"Bing Holliday is a 62 y.o. female on day 1 of admission presenting with Disorientation.      Subjective   No overnight events       Objective     Last Recorded Vitals  Blood pressure 126/64, pulse 61, temperature 35.2 °C (95.4 °F), temperature source Temporal, resp. rate 18, height 1.803 m (5' 11\"), weight 94.6 kg (208 lb 8.9 oz), SpO2 94 %.    Gen: NAD  Neuro:  --HIF: A&O X 3, repetition and naming intact  --CN:  PERRLA, EOMI, VFF, no visible facial asymmetry, facial sensation intact, no tongue or palatal deviation, SCM intact  --Motor:       UE:  5/5 except distally where contractures are noted b/l      LE:  hip flexion 3-, knee flexion 3, knee extension 3, DF 5. PF 5  --Sensory: Intact to light touch,   --Reflex: absent symmetric, toes down  --Cerebellum: FTN intact  --Gait: Deferred     Relevant Results  MRI Thoracic Spine:  IMPRESSION:  1. Stable moderate compression fracture of the T7 vertebral body  compared to the MRI from 05/05/2021. There is no retropulsion into  the spinal canal. Remaining vertebral body heights are maintained.      2. Multilevel degenerative changes of the thoracic spine are  unchanged since the prior MRI.                         Assessment/Plan      Lower Extremity Weakness, secondary to lumbar spinal stenosis  - reviewed Neurosurgery note; plan for outpatient follow up  - PT/OT    2.  Compression Fracture of Thoracic Spine  - no evidence of retropulsion  - per NSGY    Neurology will sign off.  Please page with additional questions,      Kong Reardon MD  "

## 2023-10-21 NOTE — PROGRESS NOTES
"Bing Holliday is a 62 y.o. female on day 1 of admission presenting with Disorientation.    Subjective     No overnight events.  Patient endorses moderate back pain this morning.  During conversation frequently shifted position due to discomfort.  Discussed with patient MRI results and neurosurgery with recommendation for continued PT and no current plans for surgery.  Discussed that we can trial scheduled tylenol, lido patch and prn oxy for pain. Discussed that given imaging findings in the future she may have chronic pain issues but that the positive news is that she does not currently require emergent surgery.     Pt's son and daughter in law telephoned and updated with MRI results/plan of care. All questions answered.       Objective     Physical Exam    General: Patient does not appear to be in any acute distress, alert, awake  Head: Normocephalic, Atraumatic   Eyes: Normal external exam, EOMI  ENT: Normal external inspection of ears and nose. Oropharynx normal.  Cardiovascular: RRR, S1/S2, no murmurs, rubs, or gallops, radial pulses +2, no edema of extremities  Pulmonary: CTAB, no respiratory distress.  Abdomen: +BS, soft, non-tender, nondistended, no guarding or rebound, no masses noted  MSK: +hand contractures-chronic, limited b/l LE movement against gravity  Skin- Warm. Dry. No lesions, contusions, or erythema.  Psychiatric: Judgment intact. Appropriate mood and behavior    Last Recorded Vitals  Blood pressure 126/64, pulse 61, temperature 35.2 °C (95.4 °F), temperature source Temporal, resp. rate 18, height 1.803 m (5' 11\"), weight 94.6 kg (208 lb 8.9 oz), SpO2 98 %.  Intake/Output last 3 Shifts:  I/O last 3 completed shifts:  In: 2740 (29 mL/kg) [P.O.:800; I.V.:1940 (20.5 mL/kg)]  Out: 1900 (20.1 mL/kg) [Urine:1900 (0.6 mL/kg/hr)]  Weight: 94.6 kg     Relevant Results  Scheduled medications  acetaminophen, 975 mg, oral, q8h  amLODIPine, 2.5 mg, oral, Daily  apixaban, 2.5 mg, oral, BID  atorvastatin, 40 " mg, oral, Nightly  budesonide, 0.5 mg, nebulization, BID  cefTRIAXone, 1 g, intravenous, q24h  fludrocortisone, 0.1 mg, oral, Every other day  folic acid, 1 mg, oral, Daily  insulin lispro, 0-10 Units, subcutaneous, Before meals & nightly  ipratropium-albuteroL, 3 mL, nebulization, TID  lidocaine, 1 patch, transdermal, Daily  melatonin, 5 mg, oral, Nightly  multivitamin with minerals, 1 tablet, oral, Daily  mycophenolate, 500 mg, oral, BID  pantoprazole, 40 mg, oral, Daily before breakfast   Or  pantoprazole, 40 mg, intravenous, Daily before breakfast  predniSONE, 5 mg, oral, Daily  risperiDONE, 0.5 mg, oral, Nightly  [Held by provider] torsemide, 10 mg, oral, Daily      Continuous medications  sodium chloride 0.9%, 100 mL/hr, Last Rate: 100 mL/hr (10/21/23 0907)      PRN medications  PRN medications: dextrose, glucagon, loperamide, melatonin, metoclopramide **OR** metoclopramide, ondansetron ODT **OR** ondansetron, oxyCODONE, polyethylene glycol  Results from last 7 days   Lab Units 10/21/23  0637 10/20/23  0600 10/19/23  1455   WBC AUTO x10*3/uL 3.9* 4.4 3.7*   RBC AUTO x10*6/uL 3.02* 2.98* 3.20*   HEMOGLOBIN g/dL 8.7* 8.5* 9.2*     Results from last 7 days   Lab Units 10/21/23  0637 10/20/23  0600 10/19/23  1455   SODIUM mmol/L 143 143 141   POTASSIUM mmol/L 4.9 5.1 5.4*   CHLORIDE mmol/L 110* 113* 113*   CO2 mmol/L 27 23 23   BUN mg/dL 54* 56* 55*   CREATININE mg/dL 1.84* 1.52* 1.47*   CALCIUM mg/dL 8.6 8.6 8.9   MAGNESIUM mg/dL 1.76  --  1.88   BILIRUBIN TOTAL mg/dL  --  0.2 0.2   ALT U/L  --  26 28   AST U/L  --  32 37       FENG without exercise    Result Date: 10/21/2023  Preliminary Cardiology Report            [facility] [address] [place] [address]     Tel [phone] Fax [phone]  Preliminary Vascular Lab Report              PVR FENG  Patient Name:     LISBET Longoria Physician:  [id number] Enzo Ashley MD,                   MACE                              RPVI Study  Date:       10/21/2023    Ordering Provider:  [id number] BURKE PAGEISTER MRN/PID:          [id number]      Fellow: Accession#:       [id number]  Technologist:       Ginna Travis RVT YOB: 1960    Technologist 2: Gender:           F             Encounter#:         [id number] Admission Status: Inpatient     Location Performed: [facility]  Diagnosis/ICD: 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  PRELIMINARY CONCLUSIONS: Right Lower PVR: No evidence of arterial occlusive disease in the right lower extremity at rest. Normal digital perfusion noted. Triphasic flow is noted in the right common femoral artery, right posterior tibial artery and right dorsalis pedis artery. Ankle and digit tracings, waveforms and segmental pressures appear with in normal limits. Left Lower PVR: No evidence of arterial occlusive disease in the left lower extremity at rest. Normal digital perfusion noted. Triphasic flow is noted in the left common femoral artery, left posterior tibial artery and left dorsalis pedis artery. Ankle and digit tracings, waveforms and segmental pressures appear with in normal limits.  Imaging & Doppler Findings:  RIGHT Lower PVR                Pressures Ratios Right Posterior Tibial (Ankle) 141 mmHg  1.13 Right Dorsalis Pedis (Ankle)   138 mmHg  1.10 Right Digit (Great Toe)        133 mmHg  1.06   LEFT Lower PVR                Pressures Ratios Left Posterior Tibial (Ankle) 136 mmHg  1.09 Left Dorsalis Pedis (Ankle)   142 mmHg  1.14 Left Digit (Great Toe)        171 mmHg  1.37                      Right     Left Brachial Pressure 125 mmHg 125 mmHg   VASCULAR PRELIMINARY REPORT completed by Ginna Travis RVT on 10/21/2023 at 1:43:55 PM  ** Final **     ECG 12 lead    Result Date: 10/20/2023  Sinus bradycardia Voltage criteria for left  ventricular hypertrophy Abnormal ECG When compared with ECG of 30-AUG-2023 09:00, Sinus rhythm has replaced Atrial fibrillation Vent. rate has decreased BY  37 BPM QRS duration has increased Criteria for Septal infarct are no longer Present Criteria for Inferior infarct are no longer Present ST elevation has replaced ST depression in Lateral leads Confirmed by Caterina Dixon ([id number]) on 10/20/2023 11:20:43 PM    MR thoracic spine wo IV contrast    Result Date: 10/20/2023  Interpreted By:  Juan A Betancourt, STUDY: MR THORACIC SPINE WO IV CONTRAST; ;  10/20/2023 3:19 pm   INDICATION: Signs/Symptoms:t7 compression fracture, bilateral lower extremity weakness, intermittent urinary incontinence.   COMPARISON: CT thoracic spine from 10/19/2023. MRI thoracic spine from 05/05/2021.   ACCESSION NUMBER(S): [id number]   ORDERING CLINICIAN: HARJEET SO   TECHNIQUE: MRI of the thoracic spine was performed with acquisition of sagittal T2, sagittal T1, sagittal STIR, axial T1, and axial T2 weighted sequences.   FINDINGS: There is mild S shaped curvature of the thoracic spine with dextrocurvature superiorly and levocurvature inferiorly. There is stable grade 1 anterolisthesis at T3-T4 and mild retrolisthesis at T11-T12. Otherwise, there is no striking spondylolisthesis at any level within the thoracic spine.   There is 40-45% height loss of the T7 vertebral body, unchanged since the MRI from 05/05/2021. There is no retropulsion into the spinal canal. Remaining thoracic vertebral body heights are maintained. There is no edematous signal located within the visualized vertebral bodies.   There is mild intervertebral disc height narrowing at few levels with chronic degenerative endplate changes, similar to the prior MRI.   There are disc bulges/protrusions at multiple levels within the thoracic spine which cause variable degree of ventral thecal sac effacement but no spinal canal stenosis. Disc bulge/protrusion with osteophytic spurring  is most pronounced at the level of T11-T12, unchanged. There is variable degree of neural foraminal narrowing at multiple levels due to degenerative changes including moderate bilateral neural foraminal narrowing at T7-T8 and mild right neural foraminal narrowing at T8-T9. There is stable marked bilateral neural foraminal narrowing at T9-T10 and marked right neural foraminal narrowing at T10-T11. There is stable marked bilateral neural foraminal narrowing at T11-T12.   There is facet osteoarthropathy at multiple levels.   The thoracic spinal cord is normal in signal and caliber.       1. Stable moderate compression fracture of the T7 vertebral body compared to the MRI from 05/05/2021. There is no retropulsion into the spinal canal. Remaining vertebral body heights are maintained.   2. Multilevel degenerative changes of the thoracic spine are unchanged since the prior MRI.   This study was interpreted at [facility].     MACRO: None   Signed by: Juan A Betancourt 10/20/2023 3:31 PM Dictation workstation:   [id number]    MR lumbar spine wo IV contrast    Result Date: 10/19/2023  Interpreted By:  Lo Judge, STUDY: MR LUMBAR SPINE WO IV CONTRAST;  10/19/2023 9:16 pm   INDICATION: Signs/Symptoms:incontinence new onset.   COMPARISON: CT lumbar spine 10/19/2023, MRI 05/05/2021   ACCESSION NUMBER(S): [id number]   ORDERING CLINICIAN: JADA BURRELL   TECHNIQUE: Sagittal T1, T2, STIR, axial T1 and T2 weighted images of the lumbar spine were acquired.   FINDINGS: Alignment: Reversal of the cervical curvature and dextroscoliosis centered about L3, similar to prior imaging. Minimal retrolisthesis of L3 on L4. Minimal retrolisthesis of L4 on L5.   Vertebrae/Intervertebral Discs: Multilevel chronic appearing vertebral body height loss most prominent at L2, L3 and L4 similar to prior imaging.Mild chronic endplate bone marrow signal changes. No significant bone marrow edema identified.  Probable osseous demineralization. Multilevel intervertebral disc space narrowing and disc desiccation without significant intervertebral disc space edema.   Conus: The lower thoracic cord appears unremarkable. The conus terminates at L1.   T12-L1: Disc osteophyte complex and posterior ligamentous hypertrophy with facet arthropathy resulting in mild-to-moderate canal narrowing. Mild foraminal narrowing.   L1-2: Mild disc bulge and facet hypertrophy with moderate canal narrowing and moderate left lateral recess stenosis. Mild right and moderate left foraminal narrowing.   L2-3: Disc osteophyte complex and facet hypertrophy with moderate canal and moderate to severe lateral recess stenosis. Mild to moderate foraminal stenosis.   L3-4: Central to left paracentral disc osteophyte complex with retrolisthesis, ligamentous hypertrophy and facet arthropathy resulting in severe canal stenosis, similar are mildly progressed from prior imaging. Moderate right and severe left foraminal stenosis.   L4-5: Disc bulge and posterior ligamentous hypertrophy with facet arthropathy resulting in at least moderate canal narrowing. Mild to moderate left and severe right foraminal stenosis.   L5-S1: Disc bulge and facet hypertrophy with mild canal narrowing and moderate left lateral recess stenosis. Moderate right and mild left foraminal narrowing.   The prevertebral and posterior paraspinous soft tissues are unremarkable. A probable left renal cyst.       Extensive degenerative changes with reversal of the lumbar lordosis and severe L3-L4 canal stenosis, similar to slightly progressed from 05/05/2021. Additional findings of foraminal and lateral recess narrowing as described.   No evidence of acute fracture or traumatic subluxation.   MACRO: None   Signed by: Lo Judge 10/19/2023 10:00 PM Dictation workstation:   [id number]    CT cervical spine wo IV contrast    Result Date: 10/19/2023  Interpreted By:  Mariia Concepcion, STUDY: CT  CERVICAL SPINE WO IV CONTRAST; CT LUMBAR SPINE WO IV CONTRAST; CT THORACIC SPINE WO IV CONTRAST;  10/19/2023 5:07 pm   INDICATION: Signs/Symptoms:per neuro request; Signs/Symptoms:per neuro request for lower extremity weakness.   COMPARISON: None.   ACCESSION NUMBER(S): [id number]; [id number]; [id number]   ORDERING CLINICIAN: JADA BURRELL   TECHNIQUE: Axial CT images of the cervical spine are obtained. Axial, coronal and sagittal reconstructions are provided for review. Axial CT images of the thoracic spine are obtained. Axial, coronal and sagittal reconstructions are provided for review.Axial CT images of the lumbar spine are obtained. Axial, coronal and sagittal reconstructions are provided for review.   FINDINGS: Cervical spine: No acute fracture or subluxation. Reversal of normal cervical lordosis in the midcervical spine. Mild-moderate disc height loss seen at C3-C4, C4-C5, C5-C6, and C6-C7 with multilevel endplate erosions, greatest at C5-C6 and C6-C7. Multilevel marginal osteophytes C3-C7. Multilevel facet arthropathy in the cervical spine. Moderate right-sided neural foraminal narrowing at C3-C4, severe on the left at C4-C5, mild on the left at C5-C6, and moderate bilaterally at C6-C7. No prevertebral hematoma.   Thoracic spine: Moderate compression deformity in T7 vertebral body, age indeterminate. Levocurvature in the lower thoracic spine. Scattered facet arthropathy. Multilevel vacuum phenomenon T9-T10, T10-T11, and T11-T12. Subcentimeter bone island in T1 vertebral body. There is dense material in the urinary system bilaterally suggestive of excreted contrast. Heavy coronary artery calcifications. Streaky opacities in the lungs dependently.   Lumbar spine: Multilevel advanced disc height loss L2-L3, L3-L4, and L4-L5 with endplate erosions and marginal osteophyte formation. Levoscoliosis of the lumbar spine. Facet arthropathy in the lumbar spine, greatest inferiorly. Multilevel spinal canal  stenosis in the lower lumbar spine, at L2-L3 measuring 6 mm, at L3-L4 measuring 5 mm, and at L4-L5 measuring 4 mm. Bilateral neural foraminal narrowing is also seen at these levels. Partially visualized right hip arthroplasty with associated streak artifact. Sigmoid colonic diverticulosis. Atherosclerosis.       No acute osseous abnormality in the cervical spine. Multilevel degenerative change and neural foraminal narrowing.   Moderate age-indeterminate compression fracture T7 vertebral body.   No acute osseous abnormality in the lumbar spine. Multilevel severe canal stenosis as well as neural foraminal narrowing.   Signed by: Mariia Concepcion 10/19/2023 6:06 PM Dictation workstation:   [id number]    CT thoracic spine wo IV contrast    Result Date: 10/19/2023  Interpreted By:  Mariia Concepcion, STUDY: CT CERVICAL SPINE WO IV CONTRAST; CT LUMBAR SPINE WO IV CONTRAST; CT THORACIC SPINE WO IV CONTRAST;  10/19/2023 5:07 pm   INDICATION: Signs/Symptoms:per neuro request; Signs/Symptoms:per neuro request for lower extremity weakness.   COMPARISON: None.   ACCESSION NUMBER(S): [id number]; [id number]; [id number]   ORDERING CLINICIAN: JADA BURRELL   TECHNIQUE: Axial CT images of the cervical spine are obtained. Axial, coronal and sagittal reconstructions are provided for review. Axial CT images of the thoracic spine are obtained. Axial, coronal and sagittal reconstructions are provided for review.Axial CT images of the lumbar spine are obtained. Axial, coronal and sagittal reconstructions are provided for review.   FINDINGS: Cervical spine: No acute fracture or subluxation. Reversal of normal cervical lordosis in the midcervical spine. Mild-moderate disc height loss seen at C3-C4, C4-C5, C5-C6, and C6-C7 with multilevel endplate erosions, greatest at C5-C6 and C6-C7. Multilevel marginal osteophytes C3-C7. Multilevel facet arthropathy in the cervical spine. Moderate right-sided neural foraminal narrowing at C3-C4,  severe on the left at C4-C5, mild on the left at C5-C6, and moderate bilaterally at C6-C7. No prevertebral hematoma.   Thoracic spine: Moderate compression deformity in T7 vertebral body, age indeterminate. Levocurvature in the lower thoracic spine. Scattered facet arthropathy. Multilevel vacuum phenomenon T9-T10, T10-T11, and T11-T12. Subcentimeter bone island in T1 vertebral body. There is dense material in the urinary system bilaterally suggestive of excreted contrast. Heavy coronary artery calcifications. Streaky opacities in the lungs dependently.   Lumbar spine: Multilevel advanced disc height loss L2-L3, L3-L4, and L4-L5 with endplate erosions and marginal osteophyte formation. Levoscoliosis of the lumbar spine. Facet arthropathy in the lumbar spine, greatest inferiorly. Multilevel spinal canal stenosis in the lower lumbar spine, at L2-L3 measuring 6 mm, at L3-L4 measuring 5 mm, and at L4-L5 measuring 4 mm. Bilateral neural foraminal narrowing is also seen at these levels. Partially visualized right hip arthroplasty with associated streak artifact. Sigmoid colonic diverticulosis. Atherosclerosis.       No acute osseous abnormality in the cervical spine. Multilevel degenerative change and neural foraminal narrowing.   Moderate age-indeterminate compression fracture T7 vertebral body.   No acute osseous abnormality in the lumbar spine. Multilevel severe canal stenosis as well as neural foraminal narrowing.   Signed by: Mariia Concepcion 10/19/2023 6:06 PM Dictation workstation:   [id number]    CT lumbar spine wo IV contrast    Result Date: 10/19/2023  Interpreted By:  Mariia Concepcion, STUDY: CT CERVICAL SPINE WO IV CONTRAST; CT LUMBAR SPINE WO IV CONTRAST; CT THORACIC SPINE WO IV CONTRAST;  10/19/2023 5:07 pm   INDICATION: Signs/Symptoms:per neuro request; Signs/Symptoms:per neuro request for lower extremity weakness.   COMPARISON: None.   ACCESSION NUMBER(S): [id number]; [id number]; [id number]    ORDERING CLINICIAN: JADA BURRELL   TECHNIQUE: Axial CT images of the cervical spine are obtained. Axial, coronal and sagittal reconstructions are provided for review. Axial CT images of the thoracic spine are obtained. Axial, coronal and sagittal reconstructions are provided for review.Axial CT images of the lumbar spine are obtained. Axial, coronal and sagittal reconstructions are provided for review.   FINDINGS: Cervical spine: No acute fracture or subluxation. Reversal of normal cervical lordosis in the midcervical spine. Mild-moderate disc height loss seen at C3-C4, C4-C5, C5-C6, and C6-C7 with multilevel endplate erosions, greatest at C5-C6 and C6-C7. Multilevel marginal osteophytes C3-C7. Multilevel facet arthropathy in the cervical spine. Moderate right-sided neural foraminal narrowing at C3-C4, severe on the left at C4-C5, mild on the left at C5-C6, and moderate bilaterally at C6-C7. No prevertebral hematoma.   Thoracic spine: Moderate compression deformity in T7 vertebral body, age indeterminate. Levocurvature in the lower thoracic spine. Scattered facet arthropathy. Multilevel vacuum phenomenon T9-T10, T10-T11, and T11-T12. Subcentimeter bone island in T1 vertebral body. There is dense material in the urinary system bilaterally suggestive of excreted contrast. Heavy coronary artery calcifications. Streaky opacities in the lungs dependently.   Lumbar spine: Multilevel advanced disc height loss L2-L3, L3-L4, and L4-L5 with endplate erosions and marginal osteophyte formation. Levoscoliosis of the lumbar spine. Facet arthropathy in the lumbar spine, greatest inferiorly. Multilevel spinal canal stenosis in the lower lumbar spine, at L2-L3 measuring 6 mm, at L3-L4 measuring 5 mm, and at L4-L5 measuring 4 mm. Bilateral neural foraminal narrowing is also seen at these levels. Partially visualized right hip arthroplasty with associated streak artifact. Sigmoid colonic diverticulosis. Atherosclerosis.       No  acute osseous abnormality in the cervical spine. Multilevel degenerative change and neural foraminal narrowing.   Moderate age-indeterminate compression fracture T7 vertebral body.   No acute osseous abnormality in the lumbar spine. Multilevel severe canal stenosis as well as neural foraminal narrowing.   Signed by: Mariia Concepcion 10/19/2023 6:06 PM Dictation workstation:   [id number]       Assessment/Plan   Principal Problem:    Disorientation  Active Problems:    Ambulatory dysfunction    Myelodysplastic syndrome, unspecified (CMS/HCC)    Acute cystitis without hematuria    Urinary incontinence    AMS (altered mental status)    Weakness of both lower extremities      Plan:  Headache resolved  Incontinent of urine  Cont  IV Rocephin  Follow up on urine culture  Cr at or near patient baseline  Neurology consulted   MRI of the lumbar spine.   secondary to severe spinal stenosis in the lumbar region -Compression Fracture   Consult neuro surgery_ Dr. Kearney-T7 compression fracture -->no indication for urgent surgery per recs  Plan for PT/OT and outpatient follow up  PT OT consulted-patient states she ants home with resumed-Patient is less inclined to pursue placement options with a skilled facility.   Home medications resumed  -will also need referral to outpatient endo as pt continues to have intermittent hypoglycemia (chronic issue)    Dispo:pending PT    CODE STATUS: FULL CODE                 Aishwarya Dee MD

## 2023-10-21 NOTE — CONSULTS
Consults   Reason For Consult  Left 4th digit wound    History Of Present Illness  Bing Holliday is a 62 y.o. female who presented to [facility] ED and was admitted on 10/20/23 for lumbar stenosis with neurogentic claudication.  Podiatry consulted for left 4th digit wound.  Patient states that the wound started as a callus, turned to a blister, and an open wound.  Patient states that she does not have an established podiatrist at this time.  Patient states that the wound is painful, describes the pain as achy, and rates the pain 4 out of 10.  Patient states that the pain is well controlled with Tylenol.  Patient states that she has just been applying a Band-Aid and antibiotic ointment.  Patient denies any current constitutional symptoms, and has no other complaints at this time.     Past Medical History  She has a past medical history of Acute upper respiratory infection, unspecified (03/04/2020), Acute upper respiratory infection, unspecified (09/30/2015), Arthritis, Body mass index (BMI) 23.0-23.9, adult (10/15/2021), Body mass index (BMI) 33.0-33.9, adult (03/04/2020), Cardiomegaly (08/27/2013), Chronic kidney disease, stage 3 unspecified (CMS/HCC) (07/02/2013), Disease of pericardium, unspecified (07/02/2013), Encounter for follow-up examination after completed treatment for conditions other than malignant neoplasm (10/06/2022), Generalized contraction of visual field, right eye (01/29/2015), Homonymous bilateral field defects, right side (04/29/2016), Hypertensive chronic kidney disease with stage 1 through stage 4 chronic kidney disease, or unspecified chronic kidney disease (07/02/2013), Laceration without foreign body, left foot, initial encounter (07/03/2018), Migraine with aura, not intractable, without status migrainosus (10/24/2022), Other conditions influencing health status (07/02/2013), Other conditions influencing health status (07/02/2013), Other conditions influencing health status (07/02/2013),  Other conditions influencing health status (05/22/2015), Other conditions influencing health status (10/24/2022), Other conditions influencing health status (03/14/2022), Other long term (current) drug therapy (10/24/2022), Personal history of diseases of the blood and blood-forming organs and certain disorders involving the immune mechanism (07/02/2013), Personal history of diseases of the skin and subcutaneous tissue (08/11/2015), Personal history of nephrotic syndrome (07/02/2013), Personal history of other diseases of the circulatory system (08/27/2013), Personal history of other diseases of the nervous system and sense organs, Personal history of other diseases of the respiratory system, Personal history of other infectious and parasitic diseases (07/02/2013), Personal history of other specified conditions, Personal history of other specified conditions (08/27/2013), Puckering of macula, right eye (10/24/2022), Raynaud's syndrome without gangrene (07/02/2013), Systemic lupus erythematosus, unspecified (CMS/[facility]) (07/24/2015), Systemic lupus erythematosus, unspecified (CMS/[facility]) (07/24/2015), Systemic lupus erythematosus, unspecified (CMS/[facility]) (07/24/2015), Type 2 diabetes mellitus with diabetic nephropathy (CMS/[facility]) (07/02/2013), Type 2 diabetes mellitus with mild nonproliferative diabetic retinopathy without macular edema, left eye (CMS/[facility]) (07/27/2015), Type 2 diabetes mellitus with mild nonproliferative diabetic retinopathy without macular edema, unspecified eye (CMS/[facility]) (07/24/2015), Type 2 diabetes mellitus with proliferative diabetic retinopathy without macular edema, right eye (CMS/[facility]) (07/27/2015), Type 2 diabetes mellitus with proliferative diabetic retinopathy without macular edema, unspecified eye (CMS/[facility]) (07/24/2015), Unspecified acute and subacute iridocyclitis (07/24/2015), and Unspecified open wound, left foot, sequela (07/03/2018).    Surgical History  She has a past surgical history that  includes Eye surgery (03/06/2015); Total hip arthroplasty (01/29/2015); Cholecystectomy (01/29/2015); Other surgical history (01/29/2015); Other surgical history (01/29/2015); Ankle surgery (01/29/2015); Foot surgery (01/29/2015); MR angio head wo IV contrast (7/26/2013); MR angio neck wo IV contrast (7/26/2013); CT guided percutaneous biopsy bone deep (5/4/2021); MR angio head wo IV contrast (9/17/2021); MR angio neck wo IV contrast (9/17/2021); MR angio neck wo IV contrast (3/25/2023); and MR angio head wo IV contrast (3/25/2023).     Social History  She reports that she has never smoked. She has never used smokeless tobacco. She reports that she does not drink alcohol and does not use drugs.    Family History  No family history on file.     Allergies  Ace inhibitors, Hydroxychloroquine, Lisinopril, Penicillins, and Sulfa (sulfonamide antibiotics)    Review of Systems  Review of Systems      Physical Exam  Vascular: DP and PT pulses palpable 1/4 bilaterally.  CFT under 5 seconds when tested at distal digits.  Skin temp warm to warm from proximal to distal.  +2 pitting edema noted to BLE.    Neuro: Protective sensation diminished, light tough sensation intact.  No Tinel's or Valleix's signs elicited.      Derm:  Full thickness non pressure ulcer noted to dorsal 4th digit PIPJ.  No probe to bone or exposed tendon noted at this time.  Wound base is nearly 100% fibrotic with mild serosanguineous drainage no purulence, erythema, proximal streaking, or other SOI noted at this time.  Hyperkeratotic tissue noted to lateral aspect of left plantar foot at fifth metatarsal styloid process, no open lesion upon debridement.  Hyperkeratotic tissue noted to subsecond metatarsal head right foot, no open lesion upon debridement.  No subcutaneous nodules.  Interdigital spaces are CDI.    Musc: Rigid cavus deformity noted to left foot, with nonreducible hammertoe deformities noted to digits 2 through 5 bilaterally.  No other gross  "deformities noted.  No POP noted to any other area of the foot/ankle.     Medications  Scheduled medications  acetaminophen, 975 mg, oral, q8h  amLODIPine, 2.5 mg, oral, Daily  apixaban, 2.5 mg, oral, BID  atorvastatin, 40 mg, oral, Nightly  budesonide, 0.5 mg, nebulization, BID  cefTRIAXone, 1 g, intravenous, q24h  fludrocortisone, 0.1 mg, oral, Every other day  folic acid, 1 mg, oral, Daily  insulin lispro, 0-10 Units, subcutaneous, Before meals & nightly  ipratropium-albuteroL, 3 mL, nebulization, TID  lidocaine, 1 patch, transdermal, Daily  melatonin, 5 mg, oral, Nightly  multivitamin with minerals, 1 tablet, oral, Daily  mycophenolate, 500 mg, oral, BID  pantoprazole, 40 mg, oral, Daily before breakfast   Or  pantoprazole, 40 mg, intravenous, Daily before breakfast  predniSONE, 5 mg, oral, Daily  risperiDONE, 0.5 mg, oral, Nightly  [Held by provider] torsemide, 10 mg, oral, Daily      Continuous medications  sodium chloride 0.9%, 100 mL/hr, Last Rate: 100 mL/hr (10/21/23 0907)      PRN medications  PRN medications: acetaminophen **OR** acetaminophen **OR** acetaminophen, acetaminophen **OR** acetaminophen **OR** acetaminophen, dextrose, glucagon, loperamide, melatonin, metoclopramide **OR** metoclopramide, ondansetron ODT **OR** ondansetron, oxyCODONE, polyethylene glycol     Last Recorded Vitals  Blood pressure 126/64, pulse 61, temperature 35.2 °C (95.4 °F), temperature source Temporal, resp. rate 18, height 1.803 m (5' 11\"), weight 94.6 kg (208 lb 8.9 oz), SpO2 94 %.    Relevant Results  Results for orders placed or performed during the hospital encounter of 10/19/23 (from the past 24 hour(s))   POCT GLUCOSE   Result Value Ref Range    POCT Glucose 46 (L) 74 - 99 mg/dL   POCT GLUCOSE   Result Value Ref Range    POCT Glucose 130 (H) 74 - 99 mg/dL   POCT GLUCOSE   Result Value Ref Range    POCT Glucose 74 74 - 99 mg/dL   POCT GLUCOSE   Result Value Ref Range    POCT Glucose 79 74 - 99 mg/dL   ECG 12 lead "   Result Value Ref Range    Ventricular Rate 57 BPM    Atrial Rate 57 BPM    LA Interval 184 ms    QRS Duration 100 ms    QT Interval 454 ms    QTC Calculation(Bazett) 441 ms    P Axis 56 degrees    R Axis -19 degrees    T Axis 21 degrees    QRS Count 10 beats    Q Onset 217 ms    P Onset 125 ms    P Offset 185 ms    T Offset 444 ms    QTC Fredericia 446 ms   POCT GLUCOSE   Result Value Ref Range    POCT Glucose 67 (L) 74 - 99 mg/dL   POCT GLUCOSE   Result Value Ref Range    POCT Glucose 67 (L) 74 - 99 mg/dL   POCT GLUCOSE   Result Value Ref Range    POCT Glucose 164 (H) 74 - 99 mg/dL   CBC   Result Value Ref Range    WBC 3.9 (L) 4.4 - 11.3 x10*3/uL    nRBC 0.0 0.0 - 0.0 /100 WBCs    RBC 3.02 (L) 4.00 - 5.20 x10*6/uL    Hemoglobin 8.7 (L) 12.0 - 16.0 g/dL    Hematocrit 29.4 (L) 36.0 - 46.0 %    MCV 97 80 - 100 fL    MCH 28.8 26.0 - 34.0 pg    MCHC 29.6 (L) 32.0 - 36.0 g/dL    RDW 18.2 (H) 11.5 - 14.5 %    Platelets 98 (L) 150 - 450 x10*3/uL    MPV 13.2 (H) 7.5 - 11.5 fL   Basic Metabolic Panel   Result Value Ref Range    Glucose 40 (LL) 74 - 99 mg/dL    Sodium 143 136 - 145 mmol/L    Potassium 4.9 3.5 - 5.3 mmol/L    Chloride 110 (H) 98 - 107 mmol/L    Bicarbonate 27 21 - 32 mmol/L    Anion Gap 11 10 - 20 mmol/L    Urea Nitrogen 54 (H) 6 - 23 mg/dL    Creatinine 1.84 (H) 0.50 - 1.05 mg/dL    eGFR 31 (L) >60 mL/min/1.73m*2    Calcium 8.6 8.6 - 10.3 mg/dL   Magnesium   Result Value Ref Range    Magnesium 1.76 1.60 - 2.40 mg/dL   POCT GLUCOSE   Result Value Ref Range    POCT Glucose 54 (L) 74 - 99 mg/dL   POCT GLUCOSE   Result Value Ref Range    POCT Glucose 102 (H) 74 - 99 mg/dL      ECG 12 lead    Result Date: 10/20/2023  Sinus bradycardia Voltage criteria for left ventricular hypertrophy Abnormal ECG When compared with ECG of 30-AUG-2023 09:00, Sinus rhythm has replaced Atrial fibrillation Vent. rate has decreased BY  37 BPM QRS duration has increased Criteria for Septal infarct are no longer Present Criteria for  Inferior infarct are no longer Present ST elevation has replaced ST depression in Lateral leads Confirmed by Caterina Dixon ([id number]) on 10/20/2023 11:20:43 PM    MR thoracic spine wo IV contrast    Result Date: 10/20/2023  Interpreted By:  Juan A Betancourt, STUDY: MR THORACIC SPINE WO IV CONTRAST; ;  10/20/2023 3:19 pm   INDICATION: Signs/Symptoms:t7 compression fracture, bilateral lower extremity weakness, intermittent urinary incontinence.   COMPARISON: CT thoracic spine from 10/19/2023. MRI thoracic spine from 05/05/2021.   ACCESSION NUMBER(S): [id number]   ORDERING CLINICIAN: HARJEET SO   TECHNIQUE: MRI of the thoracic spine was performed with acquisition of sagittal T2, sagittal T1, sagittal STIR, axial T1, and axial T2 weighted sequences.   FINDINGS: There is mild S shaped curvature of the thoracic spine with dextrocurvature superiorly and levocurvature inferiorly. There is stable grade 1 anterolisthesis at T3-T4 and mild retrolisthesis at T11-T12. Otherwise, there is no striking spondylolisthesis at any level within the thoracic spine.   There is 40-45% height loss of the T7 vertebral body, unchanged since the MRI from 05/05/2021. There is no retropulsion into the spinal canal. Remaining thoracic vertebral body heights are maintained. There is no edematous signal located within the visualized vertebral bodies.   There is mild intervertebral disc height narrowing at few levels with chronic degenerative endplate changes, similar to the prior MRI.   There are disc bulges/protrusions at multiple levels within the thoracic spine which cause variable degree of ventral thecal sac effacement but no spinal canal stenosis. Disc bulge/protrusion with osteophytic spurring is most pronounced at the level of T11-T12, unchanged. There is variable degree of neural foraminal narrowing at multiple levels due to degenerative changes including moderate bilateral neural foraminal narrowing at T7-T8 and mild right neural foraminal  narrowing at T8-T9. There is stable marked bilateral neural foraminal narrowing at T9-T10 and marked right neural foraminal narrowing at T10-T11. There is stable marked bilateral neural foraminal narrowing at T11-T12.   There is facet osteoarthropathy at multiple levels.   The thoracic spinal cord is normal in signal and caliber.       1. Stable moderate compression fracture of the T7 vertebral body compared to the MRI from 05/05/2021. There is no retropulsion into the spinal canal. Remaining vertebral body heights are maintained.   2. Multilevel degenerative changes of the thoracic spine are unchanged since the prior MRI.   This study was interpreted at [facility].     MACRO: None   Signed by: Juan A Betancourt 10/20/2023 3:31 PM Dictation workstation:   [id number]    MR lumbar spine wo IV contrast    Result Date: 10/19/2023  Interpreted By:  Lo Judge, STUDY: MR LUMBAR SPINE WO IV CONTRAST;  10/19/2023 9:16 pm   INDICATION: Signs/Symptoms:incontinence new onset.   COMPARISON: CT lumbar spine 10/19/2023, MRI 05/05/2021   ACCESSION NUMBER(S): [id number]   ORDERING CLINICIAN: JADA BURRELL   TECHNIQUE: Sagittal T1, T2, STIR, axial T1 and T2 weighted images of the lumbar spine were acquired.   FINDINGS: Alignment: Reversal of the cervical curvature and dextroscoliosis centered about L3, similar to prior imaging. Minimal retrolisthesis of L3 on L4. Minimal retrolisthesis of L4 on L5.   Vertebrae/Intervertebral Discs: Multilevel chronic appearing vertebral body height loss most prominent at L2, L3 and L4 similar to prior imaging.Mild chronic endplate bone marrow signal changes. No significant bone marrow edema identified. Probable osseous demineralization. Multilevel intervertebral disc space narrowing and disc desiccation without significant intervertebral disc space edema.   Conus: The lower thoracic cord appears unremarkable. The conus terminates at L1.   T12-L1: Disc  osteophyte complex and posterior ligamentous hypertrophy with facet arthropathy resulting in mild-to-moderate canal narrowing. Mild foraminal narrowing.   L1-2: Mild disc bulge and facet hypertrophy with moderate canal narrowing and moderate left lateral recess stenosis. Mild right and moderate left foraminal narrowing.   L2-3: Disc osteophyte complex and facet hypertrophy with moderate canal and moderate to severe lateral recess stenosis. Mild to moderate foraminal stenosis.   L3-4: Central to left paracentral disc osteophyte complex with retrolisthesis, ligamentous hypertrophy and facet arthropathy resulting in severe canal stenosis, similar are mildly progressed from prior imaging. Moderate right and severe left foraminal stenosis.   L4-5: Disc bulge and posterior ligamentous hypertrophy with facet arthropathy resulting in at least moderate canal narrowing. Mild to moderate left and severe right foraminal stenosis.   L5-S1: Disc bulge and facet hypertrophy with mild canal narrowing and moderate left lateral recess stenosis. Moderate right and mild left foraminal narrowing.   The prevertebral and posterior paraspinous soft tissues are unremarkable. A probable left renal cyst.       Extensive degenerative changes with reversal of the lumbar lordosis and severe L3-L4 canal stenosis, similar to slightly progressed from 05/05/2021. Additional findings of foraminal and lateral recess narrowing as described.   No evidence of acute fracture or traumatic subluxation.   MACRO: None   Signed by: Lo Judge 10/19/2023 10:00 PM Dictation workstation:   [id number]    CT cervical spine wo IV contrast    Result Date: 10/19/2023  Interpreted By:  Mariia Concepcion, STUDY: CT CERVICAL SPINE WO IV CONTRAST; CT LUMBAR SPINE WO IV CONTRAST; CT THORACIC SPINE WO IV CONTRAST;  10/19/2023 5:07 pm   INDICATION: Signs/Symptoms:per neuro request; Signs/Symptoms:per neuro request for lower extremity weakness.   COMPARISON: None.    ACCESSION NUMBER(S): [id number]; [id number]; [id number]   ORDERING CLINICIAN: JADA BURRELL   TECHNIQUE: Axial CT images of the cervical spine are obtained. Axial, coronal and sagittal reconstructions are provided for review. Axial CT images of the thoracic spine are obtained. Axial, coronal and sagittal reconstructions are provided for review.Axial CT images of the lumbar spine are obtained. Axial, coronal and sagittal reconstructions are provided for review.   FINDINGS: Cervical spine: No acute fracture or subluxation. Reversal of normal cervical lordosis in the midcervical spine. Mild-moderate disc height loss seen at C3-C4, C4-C5, C5-C6, and C6-C7 with multilevel endplate erosions, greatest at C5-C6 and C6-C7. Multilevel marginal osteophytes C3-C7. Multilevel facet arthropathy in the cervical spine. Moderate right-sided neural foraminal narrowing at C3-C4, severe on the left at C4-C5, mild on the left at C5-C6, and moderate bilaterally at C6-C7. No prevertebral hematoma.   Thoracic spine: Moderate compression deformity in T7 vertebral body, age indeterminate. Levocurvature in the lower thoracic spine. Scattered facet arthropathy. Multilevel vacuum phenomenon T9-T10, T10-T11, and T11-T12. Subcentimeter bone island in T1 vertebral body. There is dense material in the urinary system bilaterally suggestive of excreted contrast. Heavy coronary artery calcifications. Streaky opacities in the lungs dependently.   Lumbar spine: Multilevel advanced disc height loss L2-L3, L3-L4, and L4-L5 with endplate erosions and marginal osteophyte formation. Levoscoliosis of the lumbar spine. Facet arthropathy in the lumbar spine, greatest inferiorly. Multilevel spinal canal stenosis in the lower lumbar spine, at L2-L3 measuring 6 mm, at L3-L4 measuring 5 mm, and at L4-L5 measuring 4 mm. Bilateral neural foraminal narrowing is also seen at these levels. Partially visualized right hip arthroplasty with associated streak  artifact. Sigmoid colonic diverticulosis. Atherosclerosis.       No acute osseous abnormality in the cervical spine. Multilevel degenerative change and neural foraminal narrowing.   Moderate age-indeterminate compression fracture T7 vertebral body.   No acute osseous abnormality in the lumbar spine. Multilevel severe canal stenosis as well as neural foraminal narrowing.   Signed by: Mariia Concepcion 10/19/2023 6:06 PM Dictation workstation:   [id number]    CT thoracic spine wo IV contrast    Result Date: 10/19/2023  Interpreted By:  Mariia Concepcion, STUDY: CT CERVICAL SPINE WO IV CONTRAST; CT LUMBAR SPINE WO IV CONTRAST; CT THORACIC SPINE WO IV CONTRAST;  10/19/2023 5:07 pm   INDICATION: Signs/Symptoms:per neuro request; Signs/Symptoms:per neuro request for lower extremity weakness.   COMPARISON: None.   ACCESSION NUMBER(S): [id number]; [id number]; [id number]   ORDERING CLINICIAN: JADA BURRELL   TECHNIQUE: Axial CT images of the cervical spine are obtained. Axial, coronal and sagittal reconstructions are provided for review. Axial CT images of the thoracic spine are obtained. Axial, coronal and sagittal reconstructions are provided for review.Axial CT images of the lumbar spine are obtained. Axial, coronal and sagittal reconstructions are provided for review.   FINDINGS: Cervical spine: No acute fracture or subluxation. Reversal of normal cervical lordosis in the midcervical spine. Mild-moderate disc height loss seen at C3-C4, C4-C5, C5-C6, and C6-C7 with multilevel endplate erosions, greatest at C5-C6 and C6-C7. Multilevel marginal osteophytes C3-C7. Multilevel facet arthropathy in the cervical spine. Moderate right-sided neural foraminal narrowing at C3-C4, severe on the left at C4-C5, mild on the left at C5-C6, and moderate bilaterally at C6-C7. No prevertebral hematoma.   Thoracic spine: Moderate compression deformity in T7 vertebral body, age indeterminate. Levocurvature in the lower thoracic  spine. Scattered facet arthropathy. Multilevel vacuum phenomenon T9-T10, T10-T11, and T11-T12. Subcentimeter bone island in T1 vertebral body. There is dense material in the urinary system bilaterally suggestive of excreted contrast. Heavy coronary artery calcifications. Streaky opacities in the lungs dependently.   Lumbar spine: Multilevel advanced disc height loss L2-L3, L3-L4, and L4-L5 with endplate erosions and marginal osteophyte formation. Levoscoliosis of the lumbar spine. Facet arthropathy in the lumbar spine, greatest inferiorly. Multilevel spinal canal stenosis in the lower lumbar spine, at L2-L3 measuring 6 mm, at L3-L4 measuring 5 mm, and at L4-L5 measuring 4 mm. Bilateral neural foraminal narrowing is also seen at these levels. Partially visualized right hip arthroplasty with associated streak artifact. Sigmoid colonic diverticulosis. Atherosclerosis.       No acute osseous abnormality in the cervical spine. Multilevel degenerative change and neural foraminal narrowing.   Moderate age-indeterminate compression fracture T7 vertebral body.   No acute osseous abnormality in the lumbar spine. Multilevel severe canal stenosis as well as neural foraminal narrowing.   Signed by: Mariia Concepcion 10/19/2023 6:06 PM Dictation workstation:   [id number]    CT lumbar spine wo IV contrast    Result Date: 10/19/2023  Interpreted By:  Mariia Concepcion, STUDY: CT CERVICAL SPINE WO IV CONTRAST; CT LUMBAR SPINE WO IV CONTRAST; CT THORACIC SPINE WO IV CONTRAST;  10/19/2023 5:07 pm   INDICATION: Signs/Symptoms:per neuro request; Signs/Symptoms:per neuro request for lower extremity weakness.   COMPARISON: None.   ACCESSION NUMBER(S): [id number]; [id number]; [id number]   ORDERING CLINICIAN: JADA BURRELL   TECHNIQUE: Axial CT images of the cervical spine are obtained. Axial, coronal and sagittal reconstructions are provided for review. Axial CT images of the thoracic spine are obtained. Axial, coronal and  sagittal reconstructions are provided for review.Axial CT images of the lumbar spine are obtained. Axial, coronal and sagittal reconstructions are provided for review.   FINDINGS: Cervical spine: No acute fracture or subluxation. Reversal of normal cervical lordosis in the midcervical spine. Mild-moderate disc height loss seen at C3-C4, C4-C5, C5-C6, and C6-C7 with multilevel endplate erosions, greatest at C5-C6 and C6-C7. Multilevel marginal osteophytes C3-C7. Multilevel facet arthropathy in the cervical spine. Moderate right-sided neural foraminal narrowing at C3-C4, severe on the left at C4-C5, mild on the left at C5-C6, and moderate bilaterally at C6-C7. No prevertebral hematoma.   Thoracic spine: Moderate compression deformity in T7 vertebral body, age indeterminate. Levocurvature in the lower thoracic spine. Scattered facet arthropathy. Multilevel vacuum phenomenon T9-T10, T10-T11, and T11-T12. Subcentimeter bone island in T1 vertebral body. There is dense material in the urinary system bilaterally suggestive of excreted contrast. Heavy coronary artery calcifications. Streaky opacities in the lungs dependently.   Lumbar spine: Multilevel advanced disc height loss L2-L3, L3-L4, and L4-L5 with endplate erosions and marginal osteophyte formation. Levoscoliosis of the lumbar spine. Facet arthropathy in the lumbar spine, greatest inferiorly. Multilevel spinal canal stenosis in the lower lumbar spine, at L2-L3 measuring 6 mm, at L3-L4 measuring 5 mm, and at L4-L5 measuring 4 mm. Bilateral neural foraminal narrowing is also seen at these levels. Partially visualized right hip arthroplasty with associated streak artifact. Sigmoid colonic diverticulosis. Atherosclerosis.       No acute osseous abnormality in the cervical spine. Multilevel degenerative change and neural foraminal narrowing.   Moderate age-indeterminate compression fracture T7 vertebral body.   No acute osseous abnormality in the lumbar spine. Multilevel  severe canal stenosis as well as neural foraminal narrowing.   Signed by: Mariia Concepcion 10/19/2023 6:06 PM Dictation workstation:   [id number]    XR chest 1 view    Result Date: 10/19/2023  Interpreted By:  Nic Moseley, STUDY: XR CHEST 1 VIEW  10/19/2023 3:11 pm   INDICATION: Signs/Symptoms:bat weakness   COMPARISON: 08/27/2023   ACCESSION NUMBER(S): [id number]   ORDERING CLINICIAN: JADA BURRELL   TECHNIQUE: A single AP portable radiograph of the chest was obtained.   FINDINGS: Multiple cardiac monitoring leads are seen over the chest.   No focal infiltrate, pleural effusion or pneumothorax is identified. The cardiac silhouette is prominent, similar to prior studies.       No focal infiltrate or pneumothorax is identified.   MACRO: None.   Signed by: Nic Moseley 10/19/2023 3:13 PM Dictation workstation:   [id number]    CT angio brain attack head w and wo IV contrast and post procedure    Result Date: 10/19/2023  Interpreted By:  Jairo Moreno, STUDY: CT ANGIO BRAIN ATTACK HEAD W AND WO IV CONTRAST AND POST PROCEDURE; CT ANGIO BRAIN ATTACK NECK W IV CONTRAST AND POST PROCEDURE; 10/19/2023 2:57 pm   INDICATION: Signs/Symptoms:hemorrhagic stroke alert; Signs/Symptoms:bat.   COMPARISON: Head CT, same day   ACCESSION NUMBER(S): [id number]; [id number]   ORDERING CLINICIAN: JADA BURRELL   TECHNIQUE: 50 mL Omnipaque 350 was administered intravenously and axial images of the head and neck were acquired. Coronal and sagittal MIPs and 3-D reconstructions were created on an independent workstation and provided for review. Image quality is significantly degraded by poor bolus timing.   FINDINGS: CTA Head:   There is minimal arterial phase enhancement, no proximal large vessel occlusion in the yxijmz-xy-Epgnqg.   CTA Neck:   There is minimal arterial phase enhancement, no high-grade narrowing or occlusion of the major cervical arteries.   The soft tissues of the neck are unremarkable. The visualized lungs  are clear. Dental caries with associated periapical lucency in the left maxilla. Degenerative changes in the spine and partially visualized glenohumeral joints.       Limited exam due to poor bolus timing, no large vessel occlusion in the head or neck.   MACRO: None   Signed by: Jairo Moreno 10/19/2023 3:08 PM Dictation workstation:   [id number]    CT angio brain attack neck w IV contrast and post procedure    Result Date: 10/19/2023  Interpreted By:  Jairo Moreno, STUDY: CT ANGIO BRAIN ATTACK HEAD W AND WO IV CONTRAST AND POST PROCEDURE; CT ANGIO BRAIN ATTACK NECK W IV CONTRAST AND POST PROCEDURE; 10/19/2023 2:57 pm   INDICATION: Signs/Symptoms:hemorrhagic stroke alert; Signs/Symptoms:bat.   COMPARISON: Head CT, same day   ACCESSION NUMBER(S): [id number]; [id number]   ORDERING CLINICIAN: JADA BURRELL   TECHNIQUE: 50 mL Omnipaque 350 was administered intravenously and axial images of the head and neck were acquired. Coronal and sagittal MIPs and 3-D reconstructions were created on an independent workstation and provided for review. Image quality is significantly degraded by poor bolus timing.   FINDINGS: CTA Head:   There is minimal arterial phase enhancement, no proximal large vessel occlusion in the jgrrzb-wj-Tqlwnp.   CTA Neck:   There is minimal arterial phase enhancement, no high-grade narrowing or occlusion of the major cervical arteries.   The soft tissues of the neck are unremarkable. The visualized lungs are clear. Dental caries with associated periapical lucency in the left maxilla. Degenerative changes in the spine and partially visualized glenohumeral joints.       Limited exam due to poor bolus timing, no large vessel occlusion in the head or neck.   MACRO: None   Signed by: Jairo Moreno 10/19/2023 3:08 PM Dictation workstation:   [id number]    CT brain attack head wo IV contrast    Result Date: 10/19/2023  Interpreted By:  Gaston Beyer, STUDY: CT BRAIN ATTACK HEAD WO IV CONTRAST;   10/19/2023 2:38 pm   INDICATION: Signs/Symptoms:Stroke Evaluation.   COMPARISON: 08/28/2023   ACCESSION NUMBER(S): [id number]   ORDERING CLINICIAN: JADA BURRELL   TECHNIQUE: Sequential trans axial images were obtained  .   FINDINGS: INTRACRANIAL:   CORTICAL SULCI AND EXTRA-AXIAL SPACES:  Unremarkable.   VENTRICULAR SYSTEM:  Unremarkable without significant dilatation.   CEREBRAL PARENCHYMA:  There is mild hypodensity at the long tracks of the white matter, particularly in the frontoparietal regions greater on the left similar to the prior exam most likely due to gliosis from arteriolar disease.There is also a small area of encephalomalacia/gliosis at the left occipital lobe most likely from a remote infarction, also similar to the prior exam. Otherwisethere is no evidence of definite subacute infarction, intracranial hemorrhage or mass.   EXTRACRANIAL: Visualized paranasal sinuses and mastoids are clear. The calvarium is intact.       Mild age related degenerative change as described without acute findings or significant change from the prior exam. No intracranial hemorrhage.   MACRO: Gaston Beyer discussed the significance and urgency of this critical finding by secure chat with  JADA BURRELL on 10/19/2023 at 2:42 pm.  (**-RCF-**) Findings:  See findings.     Signed by: Gaston Beyer 10/19/2023 2:44 PM Dictation workstation:   [id number]        Assessment/Plan     Assessment:  - Full thickness non pressure ulcer 4th digit, left foot  - DM-II complicated by peripheral neuropathy  - PVD  - Hyperkeratoses, left and right foot  - Pain, left and right foot      Plan:  - Pt examined and evaluated.  All findings discussed to patient to their satisfaction and understanding.  - Reviewed labs and chart.  - Systemic conditions managed by primary team, antibiotics managed by ID.  - Removed all hyperkeratotic tissue from left and right foot with #15 scalpel blade without incident.  No open lesions noted to callused  area.  -No SOI noted to left fourth digit wound at this time.  Wound does not probe to bone or have exposed tendon.  - Performed dressing change consisting of Betadine paint and a border foam to left 4th digit.  Applied border foam to plantar lateral left foot to pad and protect.  Dressings to be changed every other day by podiatry.  - Pt to follow up in office upon discharge.  Will continue to follow inpatient.  Please contact podiatry with any acute questions/concerns.    - Thank you for the consult.     Anibal Silveira DPM  Podiatric Surgery  Doc Halo/Clemente BUNDY spent over 30 minutes in the professional and overall care of this patient.      Anibal Silveira DPM

## 2023-10-21 NOTE — PROGRESS NOTES
Patient is a 62 year old female who is here due to back pain and urinary issues that are chronic. MRI imaging was completed that shows multilevel lumbar stenosis with compression.     Exam  BUE D5 B5 T4+, bilateral claw hand with severe arthiritis  RLE HF4 KE4- DF/PF4  LLE HF4- KE3, edema, DF/PF4/5    A&P  Medicine primary  No acute neurosurgical intervention needed  Recommend outpatient follow up with Dr. Kearney  Discussed the findings with patient  All questions were answered  Please page us if you have any questions

## 2023-10-22 DIAGNOSIS — K21.9 GASTRO-ESOPHAGEAL REFLUX DISEASE WITHOUT ESOPHAGITIS: ICD-10-CM

## 2023-10-22 LAB
ALDOLASE SERPL-CCNC: 23.9 U/L (ref 1.2–7.6)
ANION GAP SERPL CALC-SCNC: 9 MMOL/L (ref 10–20)
BUN SERPL-MCNC: 44 MG/DL (ref 6–23)
CALCIUM SERPL-MCNC: 9.1 MG/DL (ref 8.6–10.3)
CHLORIDE SERPL-SCNC: 110 MMOL/L (ref 98–107)
CO2 SERPL-SCNC: 27 MMOL/L (ref 21–32)
CREAT SERPL-MCNC: 1.63 MG/DL (ref 0.5–1.05)
ERYTHROCYTE [DISTWIDTH] IN BLOOD BY AUTOMATED COUNT: 18.6 % (ref 11.5–14.5)
GFR SERPL CREATININE-BSD FRML MDRD: 36 ML/MIN/1.73M*2
GLUCOSE BLD MANUAL STRIP-MCNC: 122 MG/DL (ref 74–99)
GLUCOSE BLD MANUAL STRIP-MCNC: 125 MG/DL (ref 74–99)
GLUCOSE BLD MANUAL STRIP-MCNC: 162 MG/DL (ref 74–99)
GLUCOSE BLD MANUAL STRIP-MCNC: 162 MG/DL (ref 74–99)
GLUCOSE BLD MANUAL STRIP-MCNC: 36 MG/DL (ref 74–99)
GLUCOSE SERPL-MCNC: 78 MG/DL (ref 74–99)
HCT VFR BLD AUTO: 31.5 % (ref 36–46)
HGB BLD-MCNC: 9.3 G/DL (ref 12–16)
MCH RBC QN AUTO: 29.1 PG (ref 26–34)
MCHC RBC AUTO-ENTMCNC: 29.5 G/DL (ref 32–36)
MCV RBC AUTO: 98 FL (ref 80–100)
NRBC BLD-RTO: 0.4 /100 WBCS (ref 0–0)
PLATELET # BLD AUTO: 104 X10*3/UL (ref 150–450)
PMV BLD AUTO: 11.8 FL (ref 7.5–11.5)
POTASSIUM SERPL-SCNC: 4.3 MMOL/L (ref 3.5–5.3)
RBC # BLD AUTO: 3.2 X10*6/UL (ref 4–5.2)
SODIUM SERPL-SCNC: 142 MMOL/L (ref 136–145)
WBC # BLD AUTO: 4.8 X10*3/UL (ref 4.4–11.3)

## 2023-10-22 PROCEDURE — 2500000001 HC RX 250 WO HCPCS SELF ADMINISTERED DRUGS (ALT 637 FOR MEDICARE OP): Performed by: NURSE PRACTITIONER

## 2023-10-22 PROCEDURE — G0378 HOSPITAL OBSERVATION PER HR: HCPCS

## 2023-10-22 PROCEDURE — 1200000002 HC GENERAL ROOM WITH TELEMETRY DAILY

## 2023-10-22 PROCEDURE — 2500000004 HC RX 250 GENERAL PHARMACY W/ HCPCS (ALT 636 FOR OP/ED): Performed by: NURSE PRACTITIONER

## 2023-10-22 PROCEDURE — 36415 COLL VENOUS BLD VENIPUNCTURE: CPT | Performed by: STUDENT IN AN ORGANIZED HEALTH CARE EDUCATION/TRAINING PROGRAM

## 2023-10-22 PROCEDURE — 94640 AIRWAY INHALATION TREATMENT: CPT

## 2023-10-22 PROCEDURE — 82947 ASSAY GLUCOSE BLOOD QUANT: CPT

## 2023-10-22 PROCEDURE — 2500000002 HC RX 250 W HCPCS SELF ADMINISTERED DRUGS (ALT 637 FOR MEDICARE OP, ALT 636 FOR OP/ED): Performed by: NURSE PRACTITIONER

## 2023-10-22 PROCEDURE — 2500000005 HC RX 250 GENERAL PHARMACY W/O HCPCS: Performed by: INTERNAL MEDICINE

## 2023-10-22 PROCEDURE — 2500000004 HC RX 250 GENERAL PHARMACY W/ HCPCS (ALT 636 FOR OP/ED): Performed by: INTERNAL MEDICINE

## 2023-10-22 PROCEDURE — 99233 SBSQ HOSP IP/OBS HIGH 50: CPT | Performed by: STUDENT IN AN ORGANIZED HEALTH CARE EDUCATION/TRAINING PROGRAM

## 2023-10-22 PROCEDURE — 97166 OT EVAL MOD COMPLEX 45 MIN: CPT | Mod: GO

## 2023-10-22 PROCEDURE — 80048 BASIC METABOLIC PNL TOTAL CA: CPT | Performed by: STUDENT IN AN ORGANIZED HEALTH CARE EDUCATION/TRAINING PROGRAM

## 2023-10-22 PROCEDURE — 2500000005 HC RX 250 GENERAL PHARMACY W/O HCPCS: Performed by: STUDENT IN AN ORGANIZED HEALTH CARE EDUCATION/TRAINING PROGRAM

## 2023-10-22 PROCEDURE — 85027 COMPLETE CBC AUTOMATED: CPT | Performed by: STUDENT IN AN ORGANIZED HEALTH CARE EDUCATION/TRAINING PROGRAM

## 2023-10-22 PROCEDURE — 2500000002 HC RX 250 W HCPCS SELF ADMINISTERED DRUGS (ALT 637 FOR MEDICARE OP, ALT 636 FOR OP/ED): Performed by: STUDENT IN AN ORGANIZED HEALTH CARE EDUCATION/TRAINING PROGRAM

## 2023-10-22 PROCEDURE — 97161 PT EVAL LOW COMPLEX 20 MIN: CPT | Mod: GP

## 2023-10-22 RX ADMIN — BUDESONIDE 0.5 MG: 0.5 INHALANT RESPIRATORY (INHALATION) at 19:28

## 2023-10-22 RX ADMIN — IPRATROPIUM BROMIDE AND ALBUTEROL SULFATE 3 ML: 2.5; .5 SOLUTION RESPIRATORY (INHALATION) at 19:28

## 2023-10-22 RX ADMIN — PANTOPRAZOLE SODIUM 40 MG: 40 TABLET, DELAYED RELEASE ORAL at 06:24

## 2023-10-22 RX ADMIN — MYCOPHENOLATE MOFETIL 500 MG: 250 CAPSULE ORAL at 09:06

## 2023-10-22 RX ADMIN — DEXTROSE MONOHYDRATE 25 G: 25 INJECTION, SOLUTION INTRAVENOUS at 09:05

## 2023-10-22 RX ADMIN — APIXABAN 2.5 MG: 2.5 TABLET, FILM COATED ORAL at 09:06

## 2023-10-22 RX ADMIN — LIDOCAINE 1 PATCH: 4 PATCH TOPICAL at 09:05

## 2023-10-22 RX ADMIN — APIXABAN 2.5 MG: 2.5 TABLET, FILM COATED ORAL at 21:20

## 2023-10-22 RX ADMIN — SODIUM CHLORIDE 100 ML/HR: 9 INJECTION, SOLUTION INTRAVENOUS at 02:33

## 2023-10-22 RX ADMIN — PREDNISONE 5 MG: 5 TABLET ORAL at 09:06

## 2023-10-22 RX ADMIN — ACETAMINOPHEN 975 MG: 325 TABLET ORAL at 18:40

## 2023-10-22 RX ADMIN — FOLIC ACID 1 MG: 1 TABLET ORAL at 09:06

## 2023-10-22 RX ADMIN — MYCOPHENOLATE MOFETIL 500 MG: 250 CAPSULE ORAL at 21:20

## 2023-10-22 RX ADMIN — ATORVASTATIN CALCIUM 40 MG: 40 TABLET, FILM COATED ORAL at 21:19

## 2023-10-22 RX ADMIN — CEFTRIAXONE SODIUM 1 G: 1 INJECTION, SOLUTION INTRAVENOUS at 21:19

## 2023-10-22 RX ADMIN — BUDESONIDE 0.5 MG: 0.5 INHALANT RESPIRATORY (INHALATION) at 08:45

## 2023-10-22 RX ADMIN — RISPERIDONE 0.5 MG: 0.5 TABLET ORAL at 21:19

## 2023-10-22 RX ADMIN — Medication 5 MG: at 21:19

## 2023-10-22 RX ADMIN — IPRATROPIUM BROMIDE AND ALBUTEROL SULFATE 3 ML: 2.5; .5 SOLUTION RESPIRATORY (INHALATION) at 08:47

## 2023-10-22 RX ADMIN — MULTIPLE VITAMINS W/ MINERALS TAB 1 TABLET: TAB at 09:06

## 2023-10-22 RX ADMIN — IPRATROPIUM BROMIDE AND ALBUTEROL SULFATE 3 ML: 2.5; .5 SOLUTION RESPIRATORY (INHALATION) at 14:01

## 2023-10-22 RX ADMIN — SODIUM CHLORIDE 100 ML/HR: 9 INJECTION, SOLUTION INTRAVENOUS at 23:32

## 2023-10-22 RX ADMIN — AMLODIPINE BESYLATE 2.5 MG: 2.5 TABLET ORAL at 09:06

## 2023-10-22 RX ADMIN — ACETAMINOPHEN 975 MG: 325 TABLET ORAL at 10:32

## 2023-10-22 ASSESSMENT — COGNITIVE AND FUNCTIONAL STATUS - GENERAL
STANDING UP FROM CHAIR USING ARMS: A LITTLE
STANDING UP FROM CHAIR USING ARMS: A LITTLE
MOVING TO AND FROM BED TO CHAIR: A LITTLE
TURNING FROM BACK TO SIDE WHILE IN FLAT BAD: A LITTLE
CLIMB 3 TO 5 STEPS WITH RAILING: TOTAL
EATING MEALS: A LITTLE
DRESSING REGULAR LOWER BODY CLOTHING: A LITTLE
TOILETING: A LITTLE
MOVING FROM LYING ON BACK TO SITTING ON SIDE OF FLAT BED WITH BEDRAILS: A LITTLE
CLIMB 3 TO 5 STEPS WITH RAILING: A LITTLE
MOVING FROM LYING ON BACK TO SITTING ON SIDE OF FLAT BED WITH BEDRAILS: A LITTLE
PERSONAL GROOMING: A LITTLE
WALKING IN HOSPITAL ROOM: TOTAL
MOVING FROM LYING ON BACK TO SITTING ON SIDE OF FLAT BED WITH BEDRAILS: A LITTLE
MOBILITY SCORE: 18
PERSONAL GROOMING: A LITTLE
STANDING UP FROM CHAIR USING ARMS: A LITTLE
DRESSING REGULAR UPPER BODY CLOTHING: A LITTLE
MOVING TO AND FROM BED TO CHAIR: A LITTLE
TURNING FROM BACK TO SIDE WHILE IN FLAT BAD: A LITTLE
MOVING TO AND FROM BED TO CHAIR: A LITTLE
DRESSING REGULAR LOWER BODY CLOTHING: A LITTLE
PERSONAL GROOMING: A LITTLE
DRESSING REGULAR UPPER BODY CLOTHING: A LITTLE
MOBILITY SCORE: 14
DRESSING REGULAR UPPER BODY CLOTHING: A LITTLE
DAILY ACTIVITIY SCORE: 18
CLIMB 3 TO 5 STEPS WITH RAILING: TOTAL
WALKING IN HOSPITAL ROOM: TOTAL
WALKING IN HOSPITAL ROOM: A LITTLE
DAILY ACTIVITIY SCORE: 18
EATING MEALS: A LITTLE
HELP NEEDED FOR BATHING: A LITTLE
EATING MEALS: A LITTLE
TURNING FROM BACK TO SIDE WHILE IN FLAT BAD: A LITTLE
TOILETING: A LITTLE
HELP NEEDED FOR BATHING: A LITTLE
DAILY ACTIVITIY SCORE: 18
DRESSING REGULAR LOWER BODY CLOTHING: A LITTLE
MOBILITY SCORE: 14
TOILETING: A LITTLE
HELP NEEDED FOR BATHING: A LITTLE

## 2023-10-22 ASSESSMENT — PAIN SCALES - GENERAL
PAINLEVEL_OUTOF10: 3
PAINLEVEL_OUTOF10: 3
PAINLEVEL_OUTOF10: 0 - NO PAIN
PAINLEVEL_OUTOF10: 0 - NO PAIN

## 2023-10-22 ASSESSMENT — PAIN - FUNCTIONAL ASSESSMENT
PAIN_FUNCTIONAL_ASSESSMENT: 0-10

## 2023-10-22 ASSESSMENT — ACTIVITIES OF DAILY LIVING (ADL)
ADL_ASSISTANCE: INDEPENDENT
ADL_ASSISTANCE: INDEPENDENT

## 2023-10-22 NOTE — PROGRESS NOTES
Occupational Therapy    Occupational Therapy    Evaluation/Treatment    Patient Name: Bing Holliday  MRN: [id number]  : 1960  Today's Date: 10/22/23  Time Calculation  Start Time: 1013  Stop Time: 1028  Time Calculation (min): 15 min       Assessment:  OT Assessment:  (Pt. benefits from moderate intensity therapy to increase safety and independence in ADLs to return to PLOF)  Prognosis: Good  Evaluation/Treatment Tolerance:  (Limited by shaking this date)  End of Session Communication: Bedside nurse  End of Session Patient Position: Up in chair, Alarm on  OT Assessment Results: Decreased ADL status, Decreased functional mobility, Decreased upper extremity strength  Prognosis: Good  Evaluation/Treatment Tolerance:  (Limited by shaking this date)  Strengths: Ability to acquire knowledge, Access to adaptive/assistive products, Capable of completing ADLs semi/independent, Housing layout, Support of Caregivers    Plan:  Treatment Interventions: ADL retraining, Functional transfer training, UE strengthening/ROM  OT Frequency: 3 times per week  OT Discharge Recommendations: Moderate intensity level of continued care  OT Recommended Transfer Status: Assist of 1  Treatment Interventions: ADL retraining, Functional transfer training, UE strengthening/ROM  Subjective     Current Problem:  1. Weakness of both lower extremities        2. Acute nonintractable headache, unspecified headache type        3. UTI (urinary tract infection), uncomplicated        4. Leg swelling  FENG without exercise    FENG without exercise    CANCELED: Vascular US PVR without exercise    CANCELED: Vascular US PVR without exercise      5. Ulcer of toe of left foot, with fat layer exposed (CMS/HCC)  FENG without exercise    FENG without exercise    CANCELED: Vascular US PVR without exercise    CANCELED: Vascular US PVR without exercise      6. Atherosclerosis of native arteries of left leg with ulceration of other part of foot (CMS/HCC)  FENG  without exercise        Past Medical History:   Diagnosis Date    Acute upper respiratory infection, unspecified 03/04/2020    Acute URI    Acute upper respiratory infection, unspecified 09/30/2015    URTI (acute upper respiratory infection)    Arthritis     Body mass index (BMI) 23.0-23.9, adult 10/15/2021    BMI 23.0-23.9, adult    Body mass index (BMI) 33.0-33.9, adult 03/04/2020    BMI 33.0-33.9,adult    Cardiomegaly 08/27/2013    Left ventricular hypertrophy    Chronic kidney disease, stage 3 unspecified (CMS/HCC) 07/02/2013    Chronic kidney disease, stage III (moderate)    Disease of pericardium, unspecified 07/02/2013    Pericardial disease    Encounter for follow-up examination after completed treatment for conditions other than malignant neoplasm 10/06/2022    Hospital discharge follow-up    Generalized contraction of visual field, right eye 01/29/2015    Generalized contraction of visual field of right eye    Homonymous bilateral field defects, right side 04/29/2016    Homonymous bilateral field defects of right side    Hypertensive chronic kidney disease with stage 1 through stage 4 chronic kidney disease, or unspecified chronic kidney disease 07/02/2013    Nephrosclerosis    Laceration without foreign body, left foot, initial encounter 07/03/2018    Foot laceration, left, initial encounter    Migraine with aura, not intractable, without status migrainosus 10/24/2022    Ocular migraine    Other conditions influencing health status 07/02/2013    Chronic Glomerulonephritis In Diseases Classified Elsewhere    Other conditions influencing health status 07/02/2013    Progressive Familial Myoclonic Epilepsy    Other conditions influencing health status 07/02/2013    Protein S Deficiency    Other conditions influencing health status 05/22/2015    Familial Combined Hyperlipidemia    Other conditions influencing health status 10/24/2022    IDDM (insulin dependent diabetes mellitus)    Other conditions influencing  health status 03/14/2022    Diabetes mellitus, insulin dependent (IDDM), uncontrolled    Other long term (current) drug therapy 10/24/2022    Long-term use of Plaquenil    Personal history of diseases of the blood and blood-forming organs and certain disorders involving the immune mechanism 07/02/2013    History of thrombocytopenia    Personal history of diseases of the skin and subcutaneous tissue 08/11/2015    History of foot ulcer    Personal history of nephrotic syndrome 07/02/2013    History of nephrotic syndrome    Personal history of other diseases of the circulatory system 08/27/2013    History of sinus tachycardia    Personal history of other diseases of the nervous system and sense organs     History of cataract    Personal history of other diseases of the respiratory system     History of bronchitis    Personal history of other infectious and parasitic diseases 07/02/2013    History of hepatitis    Personal history of other specified conditions     History of shortness of breath    Personal history of other specified conditions 08/27/2013    History of edema    Puckering of macula, right eye 10/24/2022    ERM OD (epiretinal membrane, right eye)    Raynaud's syndrome without gangrene 07/02/2013    Raynaud's disease    Systemic lupus erythematosus, unspecified (CMS/[facility]) 07/24/2015    SLE (systemic lupus erythematosus)    Systemic lupus erythematosus, unspecified (CMS/[facility]) 07/24/2015    SLE (systemic lupus erythematosus)    Systemic lupus erythematosus, unspecified (CMS/[facility]) 07/24/2015    Systemic lupus    Type 2 diabetes mellitus with diabetic nephropathy (CMS/[facility]) 07/02/2013    Type 2 diabetes with nephropathy    Type 2 diabetes mellitus with mild nonproliferative diabetic retinopathy without macular edema, left eye (CMS/[facility]) 07/27/2015    Non-proliferative diabetic retinopathy, left eye    Type 2 diabetes mellitus with mild nonproliferative diabetic retinopathy without macular edema, unspecified eye  (CMS/HCC) 07/24/2015    Mild non proliferative diabetic retinopathy    Type 2 diabetes mellitus with proliferative diabetic retinopathy without macular edema, right eye (CMS/HCC) 07/27/2015    Proliferative diabetic retinopathy of right eye    Type 2 diabetes mellitus with proliferative diabetic retinopathy without macular edema, unspecified eye (CMS/HCC) 07/24/2015    Diabetic proliferative retinopathy    Unspecified acute and subacute iridocyclitis 07/24/2015    Acute iritis, right eye    Unspecified open wound, left foot, sequela 07/03/2018    Wound, open, foot, left, sequela     Past Surgical History:   Procedure Laterality Date    ANKLE SURGERY  01/29/2015    Ankle Surgery    CHOLECYSTECTOMY  01/29/2015    Cholecystectomy    CT GUIDED PERCUTANEOUS BIOPSY BONE DEEP  5/4/2021    CT GUIDED PERCUTANEOUS BIOPSY BONE DEEP 5/4/2021 [facility] CLINICAL LEGACY    EYE SURGERY  03/06/2015    Eye Surgery    FOOT SURGERY  01/29/2015    Foot Surgery    MR HEAD ANGIO WO IV CONTRAST  7/26/2013    MR HEAD ANGIO WO IV CONTRAST 7/26/2013 [facility] CLINICAL LEGACY    MR HEAD ANGIO WO IV CONTRAST  9/17/2021    MR HEAD ANGIO WO IV CONTRAST 9/17/2021 [facility]GAC[facility]    MR HEAD ANGIO WO IV CONTRAST  3/25/2023    MR HEAD ANGIO WO IV CONTRAST [facility] MRI    MR NECK ANGIO WO IV CONTRAST  7/26/2013    MR NECK ANGIO WO IV CONTRAST 7/26/2013 [facility] CLINICAL LEGACY    MR NECK ANGIO WO IV CONTRAST  9/17/2021    MR NECK ANGIO WO IV CONTRAST 9/17/2021 [facility]    MR NECK ANGIO WO IV CONTRAST  3/25/2023    MR NECK ANGIO WO IV CONTRAST [facility] MRI    OTHER SURGICAL HISTORY  01/29/2015    Creation Of Pericardial Window    OTHER SURGICAL HISTORY  01/29/2015    Quadricepsplasty    TOTAL HIP ARTHROPLASTY  01/29/2015    Hip Replacement       General:      General  Reason for Referral: ADLs, dischage planning (T7 compessiong fx)  Referred By: Dr. Dee  Past Medical History Relevant to Rehab: RA  Family/Caregiver Present: No  Prior to Session  Communication: Bedside nurse  Patient Position Received: Bed, 3 rail up, Alarm on    Precautions:  Medical Precautions: Fall precautions, Spinal precautions       Pain:  Pain Assessment  Pain Assessment: 0-10  Pain Score: 3  Pain Type: Chronic pain  Pain Location: Foot  Pain Orientation: Left  Objective     Cognition:  Overall Cognitive Status: Within Functional Limits  Orientation Level: Oriented X4  Memory: Within Funtional Limits  Safety/Judgement: Within Functional Limits  Insight: Within function limits     Home Living:  Home Living Comments:  (Pt. lives with son and his family in home with 2 entry steps with bedroom and bathroom on first floor with walkin shower, has shower chair. PLOF MOD I with ww for mobility and ADLs. [facility].)    Prior Function:  Level of [place]: Independent with ADLs and functional transfers  ADL Assistance: Independent  Homemaking Assistance: Independent  Ambulatory Assistance: Independent         ADL's:  Grooming Assistance: Stand by  LE Dressing Assistance: Minimal  LE Dressing Deficit: Don/doff L sock, Don/doff R sock      Activity Tolerance:  Endurance: Tolerates 10 - 20 min exercise with multiple rests         Bed Mobility/Transfers: Bed Mobility  Bed Mobility:  (supine to sit SBA)  Transfers  Transfer:  (sit <-> stand min assist, pt. with whole body shaking upon standing; chair brought next to bed per pt. request and transferred with min assist and ww. Pt. states she has days when shaking is very bad)      Sensation:  Sensation Comment: Denies numbness      Hand Function:  Hand Function  Gross Grasp: Functional  Coordination: Functional    Extremities: RUE   RUE : Exceptions to WFL (severe arthritic changes to hands) and JADEN STANLEY: Exceptions to WFL    Outcome Measures: [facility] Daily Activity  Putting on and taking off regular lower body clothing: A little  Bathing (including washing, rinsing, drying): A little  Putting on and taking off regular upper body  clothing: A little  Toileting, which includes using toilet, bedpan or urinal: A little  Taking care of personal grooming such as brushing teeth: A little  Eating Meals: A little  Daily Activity - Total Score: 18    EDUCATION:  Education  Individual(s) Educated: Patient  Education Provided: Fall precautons, Risk and benefits of OT discussed with patient or other, POC discussed and agreed upon  Patient Response to Education: Patient/Caregiver Verbalized Understanding of Information    Goals:  Encounter Problems       Encounter Problems (Active)       Dressings Lower Extremities       STG - Patient to complete lower body dressing MOD I       Start:  10/22/23    Expected End:  11/05/23               Grooming       STG - Patient completes grooming SUP at sink       Start:  10/22/23    Expected End:  11/05/23            STG - Patient will tolerate standing 3-6 min to participate in ADLs       Start:  10/22/23    Expected End:  11/05/23               Mobility       Pt. will compete functional mobility with SUP       Start:  10/22/23    Expected End:  11/05/23               Transfers       STG - Patient will perform toilet transfer SUP       Start:  10/22/23    Expected End:  11/05/23

## 2023-10-22 NOTE — CARE PLAN
The patient's goals for the shift include no injury related to falls able to get some rest/sleep by the end of this shif    The clinical goals for the shift include Pt will be hemodynamically stable throughout this shift      Problem: Fall/Injury  Goal: Verbalize understanding of personal risk factors for fall in the hospital  Outcome: Progressing  Goal: Verbalize understanding of risk factor reduction measures to prevent injury from fall in the home  Outcome: Progressing  Goal: Use assistive devices by end of the shift  Outcome: Progressing  Goal: Pace activities to prevent fatigue by end of the shift  Outcome: Progressing     Problem: Pain  Goal: Takes deep breaths with improved pain control throughout the shift  Outcome: Progressing  Goal: Turns in bed with improved pain control throughout the shift  Outcome: Progressing  Goal: Walks with improved pain control throughout the shift  Outcome: Progressing  Goal: Performs ADL's with improved pain control throughout shift  Outcome: Progressing  Goal: Participates in PT with improved pain control throughout the shift  Outcome: Progressing  Goal: Free from opioid side effects throughout the shift  Outcome: Progressing  Goal: Free from acute confusion related to pain meds throughout the shift  Outcome: Progressing     Problem: Skin  Goal: Decreased wound size/increased tissue granulation at next dressing change  Outcome: Progressing  Goal: Participates in plan/prevention/treatment measures  Outcome: Progressing  Goal: Prevent/manage excess moisture  Outcome: Progressing  Goal: Prevent/minimize sheer/friction injuries  Outcome: Progressing  Goal: Promote/optimize nutrition  Outcome: Progressing  Goal: Promote skin healing  Outcome: Progressing     Problem: Fall/Injury  Goal: Not fall by end of shift  Outcome: Met  Goal: Be free from injury by end of the shift  Outcome: Met

## 2023-10-22 NOTE — PROGRESS NOTES
"Spiritual Care Visit     Patient was a delight to speak with.  She was open and shared about her life.  I was able to help her see some themes throughout her narrative and how emotional trauma was directly connected to her body.  We talked about \"unknown/unidentified\" grief (divorce) and how to grieve over the loss of certain future dreams, giving to God and letting go.  I prayed over patient. This was a very product time with patient.                                            Taxonomy  Intended Effects: Build relationship of care and support, Convey a calming presence, Demonstrate caring and concern, Lessen someone's feelings of isolation, Lessen anxiety, Journeying with someone in the grief process, Helping someone feel comforted, Nemo affirmation, Establish rapport and connectedness, Preserve dignity and respect, Promote sense of peace  Methods: Demonstrate acceptance, Encourage self care, Encourage self reflection, Encourage sharing of feelings, Encourage someone to recognize their strengths, Encourage story-telling, Encouraging spiritual/Christianity practices, Offer emotional support, Offer spiritual/Christianity support, Offer support  Interventions: Acknowledge current situation, Acknowledge response to difficult experience, Active listening, Ask guided questions, Ask guided questions about nemo, Ask questions to bring forth feelings, Prayer for healing, Invite someone to reminisce, Identify supportive relationship(s), Provide Grief Processing Session, Share words of hope and inspiration      "

## 2023-10-22 NOTE — PROGRESS NOTES
Physical Therapy    Physical Therapy Evaluation    Patient Name: Bing Holliday  MRN: [id number]  Today's Date: 10/22/2023   Time Calculation  Start Time: 1013  Stop Time: 1028  Time Calculation (min): 15 min    Assessment/Plan   PT Assessment  PT Assessment Results: Decreased strength, Decreased range of motion, Decreased endurance, Impaired balance, Decreased mobility, Decreased coordination, Decreased cognition, Decreased safety awareness, Impaired judgement  Rehab Prognosis: Good  Evaluation/Treatment Tolerance: Patient tolerated treatment well  Medical Staff Made Aware: Yes  End of Session Communication: Bedside nurse  Assessment Comment: Pt's impairments include BLE weakness, impaired balance and decreased activity tolerance. Pt's functional limitations include bed mobility, transfers, gait and elevations. Pt would benefit from continued acute care PT during hospital LOS and upon discharge at a moderate level intensity.  End of Session Patient Position: Up in chair, Alarm on  IP OR SWING BED PT PLAN  Inpatient or Swing Bed: Inpatient  PT Plan  Treatment/Interventions: Bed mobility, Transfer training, Gait training, Balance training, Stair training, Neuromuscular re-education, Strengthening, Endurance training, Range of motion, Therapeutic exercise, Therapeutic activity, Home exercise program, Positioning, Postural re-education  PT Plan: Skilled PT  PT Frequency: 3 times per week  PT Discharge Recommendations: Moderate intensity level of continued care  Equipment Recommended upon Discharge: Wheeled walker  PT Recommended Transfer Status: Assist x1, Assistive device (Bed<>chair/BSC with walker and gait belt.)      Subjective   General Visit Information:  General  Reason for Referral: Dx: Disorientation, T7 compression fracture (non-operative)  Referred By: Aishwarya Dee MD  Past Medical History Relevant to Rehab: Myelodysplastic syndrome, urinary incontinence  Family/Caregiver Present: No  Patient Position  Received: Bed, 3 rail up, Alarm on  Home Living:  Home Living  Type of Home: House  Lives With: Adult children, Grandchildren  Home Adaptive Equipment: Walker rolling or standard  Home Layout: Two level, Able to live on main level with bedroom/bathroom  Home Access: Stairs to enter without rails  Entrance Stairs-Number of Steps: 2  Bathroom Shower/Tub: Walk-in shower  Bathroom Equipment: Shower chair with back  Prior Level of Function:  Prior Function Per Pt/Caregiver Report  Level of [place]: Independent with ADLs and functional transfers, Independent with homemaking with ambulation  ADL Assistance: Independent  Homemaking Assistance: Independent  Ambulatory Assistance: Independent  Prior Function Comments: IND with FWW for gait. Pt reports usually someone is home during the day.  Precautions:  Precautions  Medical Precautions: Fall precautions    Objective   Pain:  Pain Assessment  Pain Assessment: 0-10  Pain Score: 0 - No pain (at end of PT visit.)  Cognition:  Cognition  Orientation Level: Oriented X4    General Assessments:  Activity Tolerance  Endurance: Tolerates less than 10 min exercise, no significant change in vital signs    Sensation  Light Touch: No apparent deficits    Static Sitting Balance  Static Sitting-Balance Support: No upper extremity supported, Feet supported  Static Sitting-Level of Assistance: Close supervision  Dynamic Sitting Balance  Dynamic Sitting-Balance Support: Bilateral upper extremity supported, Feet supported  Dynamic Sitting-Comments: SBA to scoot    Static Standing Balance  Static Standing-Balance Support: Bilateral upper extremity supported (walker)  Static Standing-Level of Assistance: Minimum assistance  Dynamic Standing Balance  Dynamic Standing-Balance Support: Bilateral upper extremity supported (walker)  Dynamic Standing-Comments: MinAx1 bed>chair ambulalatory transfer  Functional Assessments:  Bed Mobility  Bed Mobility: Yes  Bed Mobility 1  Bed Mobility 1: Supine  to sitting  Level of Assistance 1: Close supervision    Transfers  Transfer: Yes  Transfer 1  Transfer From 1: Sit to  Transfer to 1: Stand  Technique 1: Sit to stand  Transfer Device 1: Walker  Transfer Level of Assistance 1: Minimum assistance  Trials/Comments 1: 1st trial from EOB. Pt tolerates standing x30 seconds but is shaking/tremoring BUE/trunk.  Transfers 2  Transfer From 2: Sit to  Transfer to 2: Stand  Technique 2: Sit to stand  Transfer Device 2: Walker  Transfer Level of Assistance 2: Minimum assistance  Trials/Comments 2: 2nd trial from EOB.  Transfers 3  Transfer From 3: Bed to  Transfer to 3: Chair with arms  Technique 3: To left  Transfer Device 3: Walker  Trials/Comments 3: MinAx1. Cues to sequence steps to approach chair. Cues for hand placement and to control descent to chair 2/2 compression fracture.    Ambulation/Gait Training  Ambulation/Gait Training Performed: No  Extremity/Trunk Assessments:  RLE   RLE : Exceptions to WFL  Strength RLE  R Hip Flexion: 3/5  R Ankle Dorsiflexion: 4/5  LLE   LLE : Exceptions to WFL  Strength LLE  L Hip Flexion: 3/5  L Ankle Dorsiflexion: 4/5  Outcome Measures:  [facility] Basic Mobility  Turning from your back to your side while in a flat bed without using bedrails: A little  Moving from lying on your back to sitting on the side of a flat bed without using bedrails: A little  Moving to and from bed to chair (including a wheelchair): A little  Standing up from a chair using your arms (e.g. wheelchair or bedside chair): A little  To walk in hospital room: Total  Climbing 3-5 steps with railing: Total  Basic Mobility - Total Score: 14    Encounter Problems       Encounter Problems (Active)       Mobility       Pt. will compete functional mobility with SUP (Progressing)       Start:  10/22/23    Expected End:  11/05/23               PT Problem       Bed mobility (Progressing)       Start:  10/22/23    Expected End:  11/05/23       Pt will perform supine<>sit with HOB  flat, GRISELDA.           Transfers (Progressing)       Start:  10/22/23    Expected End:  11/05/23       Pt will perform all transfers with LRAD, GRISELDA.            Gait (Progressing)       Start:  10/22/23    Expected End:  11/05/23       Pt will amb 150' with LRAD, GRISELDA with reciprocal gait, upright posture and improved activity tolerance as demonstrated by vitals.           Stairs (Progressing)       Start:  10/22/23    Expected End:  11/05/23       Pt will ascend/descend 2 steps with LRAD, CGAx1.           Strength (Progressing)       Start:  10/22/23    Expected End:  11/05/23       Pt will improve gross BLE strength to 4/5.                Education Documentation  Mobility Training, taught by Helen Fofana, PT at 10/22/2023  1:42 PM.  Learner: Patient  Readiness: Acceptance  Method: Explanation  Response: Verbalizes Understanding    Education Comments  No comments found.

## 2023-10-22 NOTE — PROGRESS NOTES
"Bing Holliday is a 62 y.o. female on day 2 of admission presenting with Disorientation.    Subjective   No overnight events.  Reports she did not do very well with physical therapy.  Reports she felt very weak and did not move well. in terms of blood glucose she had a reading of 36 this morning, asymptomatic.  Discussed with patient at length that this writer has contacted endocrine on call and should she have a another low reading, endocrine advised obtaining stat labs before treatment of hypoglycemia.  RN also informed of plan.       Objective     Physical Exam    Constitutional: Well developed, awake/alert/oriented x3, no distress, alert and cooperative  HEENT: anicteric sclera, eomi, no oral lesions noted, moist orophraynx, no thyromegaly  Cardiovascular: Regular, rate and rhythm, no murmurs, 2+ equal pulses of the extremities, normal S1 and S2  Respiratory/Thorax: Patent airways, CTAB, normal breath sounds with good chest expansion, thorax symmetric, no conversational dyspnea  Gastrointestinal: +BS, Nondistended, soft, non-tender, no rebound tenderness or guarding, no masses palpable, no organomegaly  Musculoskeletal: +chronic b/l hand contractures, limited ROM b/l LE, decreased strength b/l  Extremities: no edema  Skin: warm and dry. No rashes nor lesions noted  Neurological: alert and oriented x3, intact senses, motor, follows commands, clear speech, no facial droop    Last Recorded Vitals  Blood pressure 110/56, pulse 70, temperature 35.2 °C (95.4 °F), temperature source Temporal, resp. rate 16, height 1.803 m (5' 11\"), weight 94.6 kg (208 lb 8.9 oz), SpO2 98 %.  Intake/Output last 3 Shifts:  I/O last 3 completed shifts:  In: 6209.3 (65.6 mL/kg) [P.O.:1676; I.V.:4433.3 (46.9 mL/kg); IV Piggyback:100]  Out: 3175 (33.6 mL/kg) [Urine:3175 (0.9 mL/kg/hr)]  Weight: 94.6 kg     Relevant Results  Scheduled medications  acetaminophen, 975 mg, oral, q8h  amLODIPine, 2.5 mg, oral, Daily  apixaban, 2.5 mg, oral, " BID  atorvastatin, 40 mg, oral, Nightly  budesonide, 0.5 mg, nebulization, BID  cefTRIAXone, 1 g, intravenous, q24h  fludrocortisone, 0.1 mg, oral, Every other day  folic acid, 1 mg, oral, Daily  insulin lispro, 0-10 Units, subcutaneous, Before meals & nightly  ipratropium-albuteroL, 3 mL, nebulization, TID  lidocaine, 1 patch, transdermal, Daily  melatonin, 5 mg, oral, Nightly  multivitamin with minerals, 1 tablet, oral, Daily  mycophenolate, 500 mg, oral, BID  pantoprazole, 40 mg, oral, Daily before breakfast   Or  pantoprazole, 40 mg, intravenous, Daily before breakfast  predniSONE, 5 mg, oral, Daily  risperiDONE, 0.5 mg, oral, Nightly  [Held by provider] torsemide, 10 mg, oral, Daily      Continuous medications  sodium chloride 0.9%, 100 mL/hr, Last Rate: 100 mL/hr (10/22/23 1044)      PRN medications  PRN medications: dextrose, glucagon, loperamide, melatonin, metoclopramide **OR** metoclopramide, ondansetron ODT **OR** ondansetron, oxyCODONE, polyethylene glycol  Results from last 7 days   Lab Units 10/22/23  1320 10/21/23  0637 10/20/23  0600   WBC AUTO x10*3/uL 4.8 3.9* 4.4   RBC AUTO x10*6/uL 3.20* 3.02* 2.98*   HEMOGLOBIN g/dL 9.3* 8.7* 8.5*     Results from last 7 days   Lab Units 10/22/23  1320 10/21/23  0637 10/20/23  0600 10/19/23  1455   SODIUM mmol/L 142 143 143 141   POTASSIUM mmol/L 4.3 4.9 5.1 5.4*   CHLORIDE mmol/L 110* 110* 113* 113*   CO2 mmol/L 27 27 23 23   BUN mg/dL 44* 54* 56* 55*   CREATININE mg/dL 1.63* 1.84* 1.52* 1.47*   CALCIUM mg/dL 9.1 8.6 8.6 8.9   MAGNESIUM mg/dL  --  1.76  --  1.88   BILIRUBIN TOTAL mg/dL  --   --  0.2 0.2   ALT U/L  --   --  26 28   AST U/L  --   --  32 37       FENG without exercise    Result Date: 10/21/2023            [facility] [place] [address] [place] [address]     Tel [phone] Fax [phone]  Vascular Lab Report        PVR FENG Patient Name:      LISBET Longoria Physician:  [id number] Giraldo                                                               Dion FORDE, VI Study Date:        10/21/2023            Ordering Provider:  [id number] BURKE TORRES                                                              YOJANA MRN/PID:           [id number]              Fellow: Accession#:        [id number]          Technologist:       Ginna Travis RVT Date of Birth/Age: 1960 / 62 years Technologist 2: Gender:            F                     Encounter#:         [id number] Admission Status:  Inpatient             Location Performed: [place]  Diagnosis/ICD: 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  CONCLUSIONS: Right Lower PVR: No evidence of arterial occlusive disease in the right lower extremity at rest. Normal digital perfusion noted. Triphasic flow is noted in the right common femoral artery, right posterior tibial artery and right dorsalis pedis artery. Ankle and digit tracings, waveforms and segmental pressures appear with in normal limits. Left Lower PVR: No evidence of arterial occlusive disease in the left lower extremity at rest. Normal digital perfusion noted. Triphasic flow is noted in the left common femoral artery, left posterior tibial artery and left dorsalis pedis artery. Ankle and digit tracings, waveforms and segmental pressures appear with in normal limits.  Imaging & Doppler Findings:  RIGHT Lower PVR                Pressures Ratios Right Posterior Tibial (Ankle) 141 mmHg  1.13 Right Dorsalis Pedis (Ankle)   138 mmHg  1.10 Right Digit (Great Toe)        133 mmHg  1.06   LEFT Lower PVR                Pressures Ratios Left Posterior Tibial (Ankle) 136 mmHg  1.09 Left Dorsalis Pedis (Ankle)   142 mmHg  1.14 Left Digit (Great Toe)        171 mmHg  1.37                      Right     Left Brachial  Pressure 125 mmHg 125 mmHg   [id number] Enzo Ashley MD, RPAUNDREA Electronically signed by [id number] Enzo Ashley MD, RPAUNDREA on 10/21/2023 at 4:35:21 PM  ** Final **     ECG 12 lead    Result Date: 10/20/2023  Sinus bradycardia Voltage criteria for left ventricular hypertrophy Abnormal ECG When compared with ECG of 30-AUG-2023 09:00, Sinus rhythm has replaced Atrial fibrillation Vent. rate has decreased BY  37 BPM QRS duration has increased Criteria for Septal infarct are no longer Present Criteria for Inferior infarct are no longer Present ST elevation has replaced ST depression in Lateral leads Confirmed by Caterina Dixon ([id number]) on 10/20/2023 11:20:43 PM       Assessment/Plan   Principal Problem:    Disorientation  Active Problems:    Ambulatory dysfunction    Myelodysplastic syndrome, unspecified (CMS/HCC)    Acute cystitis without hematuria    Urinary incontinence    AMS (altered mental status)    Weakness of both lower extremities    hypoglycemia    Plan:  Hypoglycemia  Recurrent chronic issue. Pt has been unable to obtain close endo follow up after prior admissions. Contacted endo on call and appreciate input. As part of workup for hypoglycemia of unknown etiology, will need to obtain stat labs at time of hypoglycemic episode BEFORE treating the hypoglycemia    If pt has recurrent hypoglycemia, will need to obtain STAT prior to treating hypoglycemic reading.   -c peptide  -bmp  -beta hydroxybutyrate  -sulfonylurea    No evidence of pancreatic abnormality on imaging. No hx of DM nor chronic insulin use.    Headache resolved  Incontinent of urine  Cont  IV Rocephin  Follow up on urine culture  Cr at or near patient baseline  Neurology consulted   MRI of the lumbar spine.   secondary to severe spinal stenosis in the lumbar region -Compression Fracture   Consult neuro surgery_ Dr. Kearney-T7 compression fracture -->no indication for urgent surgery per recs  Plan for PT/OT and outpatient follow up  PT OT consulted-patient  states she wants home with resumed-Patient is less inclined to pursue placement options with a skilled facility.   Home medications resumed  -will also need referral to outpatient endo as pt continues to have intermittent hypoglycemia (chronic issue)     Dispo:pending PT     CODE STATUS: FULL CODE                 Aishwarya Dee MD

## 2023-10-23 ENCOUNTER — APPOINTMENT (OUTPATIENT)
Dept: PRIMARY CARE | Facility: CLINIC | Age: 63
End: 2023-10-23
Payer: MEDICARE

## 2023-10-23 PROBLEM — R41.0 DISORIENTATION: Status: RESOLVED | Noted: 2023-10-19 | Resolved: 2023-10-23

## 2023-10-23 PROBLEM — N30.00 ACUTE CYSTITIS WITHOUT HEMATURIA: Status: RESOLVED | Noted: 2023-10-19 | Resolved: 2023-10-23

## 2023-10-23 PROBLEM — R41.82 AMS (ALTERED MENTAL STATUS): Status: RESOLVED | Noted: 2023-10-19 | Resolved: 2023-10-23

## 2023-10-23 LAB
ANION GAP SERPL CALC-SCNC: 11 MMOL/L (ref 10–20)
B-OH-BUTYR SERPL-SCNC: 0.07 MMOL/L (ref 0.02–0.27)
BUN SERPL-MCNC: 46 MG/DL (ref 6–23)
C PEPTIDE SERPL-MCNC: 3 NG/ML (ref 0.7–3.9)
CALCIUM SERPL-MCNC: 8.6 MG/DL (ref 8.6–10.3)
CHLORIDE SERPL-SCNC: 114 MMOL/L (ref 98–107)
CO2 SERPL-SCNC: 23 MMOL/L (ref 21–32)
CREAT SERPL-MCNC: 1.7 MG/DL (ref 0.5–1.05)
ERYTHROCYTE [DISTWIDTH] IN BLOOD BY AUTOMATED COUNT: 18.4 % (ref 11.5–14.5)
GFR SERPL CREATININE-BSD FRML MDRD: 34 ML/MIN/1.73M*2
GLUCOSE BLD MANUAL STRIP-MCNC: 129 MG/DL (ref 74–99)
GLUCOSE BLD MANUAL STRIP-MCNC: 42 MG/DL (ref 74–99)
GLUCOSE BLD MANUAL STRIP-MCNC: 62 MG/DL (ref 74–99)
GLUCOSE BLD MANUAL STRIP-MCNC: 87 MG/DL (ref 74–99)
GLUCOSE BLD MANUAL STRIP-MCNC: 96 MG/DL (ref 74–99)
GLUCOSE SERPL-MCNC: 48 MG/DL (ref 74–99)
HCT VFR BLD AUTO: 30.2 % (ref 36–46)
HGB BLD-MCNC: 8.9 G/DL (ref 12–16)
MAGNESIUM SERPL-MCNC: 1.73 MG/DL (ref 1.6–2.4)
MCH RBC QN AUTO: 29.6 PG (ref 26–34)
MCHC RBC AUTO-ENTMCNC: 29.5 G/DL (ref 32–36)
MCV RBC AUTO: 100 FL (ref 80–100)
NRBC BLD-RTO: 0 /100 WBCS (ref 0–0)
PLATELET # BLD AUTO: 104 X10*3/UL (ref 150–450)
PMV BLD AUTO: 13.5 FL (ref 7.5–11.5)
POTASSIUM SERPL-SCNC: 4.8 MMOL/L (ref 3.5–5.3)
RBC # BLD AUTO: 3.01 X10*6/UL (ref 4–5.2)
SODIUM SERPL-SCNC: 143 MMOL/L (ref 136–145)
WBC # BLD AUTO: 3.9 X10*3/UL (ref 4.4–11.3)

## 2023-10-23 PROCEDURE — 85027 COMPLETE CBC AUTOMATED: CPT | Performed by: STUDENT IN AN ORGANIZED HEALTH CARE EDUCATION/TRAINING PROGRAM

## 2023-10-23 PROCEDURE — 80048 BASIC METABOLIC PNL TOTAL CA: CPT | Performed by: STUDENT IN AN ORGANIZED HEALTH CARE EDUCATION/TRAINING PROGRAM

## 2023-10-23 PROCEDURE — 94640 AIRWAY INHALATION TREATMENT: CPT

## 2023-10-23 PROCEDURE — 82947 ASSAY GLUCOSE BLOOD QUANT: CPT

## 2023-10-23 PROCEDURE — 0HBNXZZ EXCISION OF LEFT FOOT SKIN, EXTERNAL APPROACH: ICD-10-PCS | Performed by: STUDENT IN AN ORGANIZED HEALTH CARE EDUCATION/TRAINING PROGRAM

## 2023-10-23 PROCEDURE — 1200000002 HC GENERAL ROOM WITH TELEMETRY DAILY

## 2023-10-23 PROCEDURE — 80377 DRUG/SUBSTANCE NOS 7/MORE: CPT | Performed by: STUDENT IN AN ORGANIZED HEALTH CARE EDUCATION/TRAINING PROGRAM

## 2023-10-23 PROCEDURE — 84681 ASSAY OF C-PEPTIDE: CPT | Mod: STJLAB | Performed by: STUDENT IN AN ORGANIZED HEALTH CARE EDUCATION/TRAINING PROGRAM

## 2023-10-23 PROCEDURE — 83735 ASSAY OF MAGNESIUM: CPT | Performed by: STUDENT IN AN ORGANIZED HEALTH CARE EDUCATION/TRAINING PROGRAM

## 2023-10-23 PROCEDURE — 0HBMXZZ EXCISION OF RIGHT FOOT SKIN, EXTERNAL APPROACH: ICD-10-PCS | Performed by: STUDENT IN AN ORGANIZED HEALTH CARE EDUCATION/TRAINING PROGRAM

## 2023-10-23 PROCEDURE — 2500000002 HC RX 250 W HCPCS SELF ADMINISTERED DRUGS (ALT 637 FOR MEDICARE OP, ALT 636 FOR OP/ED): Performed by: NURSE PRACTITIONER

## 2023-10-23 PROCEDURE — 82010 KETONE BODYS QUAN: CPT | Performed by: STUDENT IN AN ORGANIZED HEALTH CARE EDUCATION/TRAINING PROGRAM

## 2023-10-23 PROCEDURE — 99239 HOSP IP/OBS DSCHRG MGMT >30: CPT | Performed by: NURSE PRACTITIONER

## 2023-10-23 PROCEDURE — 2500000001 HC RX 250 WO HCPCS SELF ADMINISTERED DRUGS (ALT 637 FOR MEDICARE OP): Performed by: NURSE PRACTITIONER

## 2023-10-23 PROCEDURE — 36415 COLL VENOUS BLD VENIPUNCTURE: CPT | Performed by: STUDENT IN AN ORGANIZED HEALTH CARE EDUCATION/TRAINING PROGRAM

## 2023-10-23 PROCEDURE — 2500000004 HC RX 250 GENERAL PHARMACY W/ HCPCS (ALT 636 FOR OP/ED): Performed by: INTERNAL MEDICINE

## 2023-10-23 PROCEDURE — G0378 HOSPITAL OBSERVATION PER HR: HCPCS

## 2023-10-23 PROCEDURE — 84681 ASSAY OF C-PEPTIDE: CPT | Mod: CMCLAB | Performed by: STUDENT IN AN ORGANIZED HEALTH CARE EDUCATION/TRAINING PROGRAM

## 2023-10-23 PROCEDURE — 2500000002 HC RX 250 W HCPCS SELF ADMINISTERED DRUGS (ALT 637 FOR MEDICARE OP, ALT 636 FOR OP/ED): Performed by: STUDENT IN AN ORGANIZED HEALTH CARE EDUCATION/TRAINING PROGRAM

## 2023-10-23 PROCEDURE — 2500000005 HC RX 250 GENERAL PHARMACY W/O HCPCS: Performed by: INTERNAL MEDICINE

## 2023-10-23 PROCEDURE — 2500000004 HC RX 250 GENERAL PHARMACY W/ HCPCS (ALT 636 FOR OP/ED): Performed by: NURSE PRACTITIONER

## 2023-10-23 RX ORDER — PANTOPRAZOLE SODIUM 40 MG/1
TABLET, DELAYED RELEASE ORAL
Qty: 90 TABLET | Refills: 1 | Status: ON HOLD | OUTPATIENT
Start: 2023-10-23

## 2023-10-23 RX ADMIN — ATORVASTATIN CALCIUM 40 MG: 40 TABLET, FILM COATED ORAL at 20:20

## 2023-10-23 RX ADMIN — ACETAMINOPHEN 975 MG: 325 TABLET ORAL at 04:26

## 2023-10-23 RX ADMIN — SODIUM CHLORIDE 100 ML/HR: 9 INJECTION, SOLUTION INTRAVENOUS at 09:24

## 2023-10-23 RX ADMIN — SODIUM CHLORIDE 100 ML/HR: 9 INJECTION, SOLUTION INTRAVENOUS at 20:21

## 2023-10-23 RX ADMIN — MULTIPLE VITAMINS W/ MINERALS TAB 1 TABLET: TAB at 08:12

## 2023-10-23 RX ADMIN — RISPERIDONE 0.5 MG: 0.5 TABLET ORAL at 20:21

## 2023-10-23 RX ADMIN — CEFTRIAXONE SODIUM 1 G: 1 INJECTION, SOLUTION INTRAVENOUS at 20:20

## 2023-10-23 RX ADMIN — APIXABAN 2.5 MG: 2.5 TABLET, FILM COATED ORAL at 08:12

## 2023-10-23 RX ADMIN — PANTOPRAZOLE SODIUM 40 MG: 40 TABLET, DELAYED RELEASE ORAL at 06:30

## 2023-10-23 RX ADMIN — AMLODIPINE BESYLATE 2.5 MG: 2.5 TABLET ORAL at 08:12

## 2023-10-23 RX ADMIN — PREDNISONE 5 MG: 5 TABLET ORAL at 08:12

## 2023-10-23 RX ADMIN — MYCOPHENOLATE MOFETIL 500 MG: 250 CAPSULE ORAL at 09:22

## 2023-10-23 RX ADMIN — ACETAMINOPHEN 975 MG: 325 TABLET ORAL at 17:52

## 2023-10-23 RX ADMIN — FLUDROCORTISONE ACETATE 0.1 MG: 0.1 TABLET ORAL at 09:22

## 2023-10-23 RX ADMIN — Medication 5 MG: at 20:21

## 2023-10-23 RX ADMIN — APIXABAN 2.5 MG: 2.5 TABLET, FILM COATED ORAL at 20:21

## 2023-10-23 RX ADMIN — IPRATROPIUM BROMIDE AND ALBUTEROL SULFATE 3 ML: 2.5; .5 SOLUTION RESPIRATORY (INHALATION) at 19:55

## 2023-10-23 RX ADMIN — MYCOPHENOLATE MOFETIL 500 MG: 250 CAPSULE ORAL at 20:20

## 2023-10-23 RX ADMIN — BUDESONIDE 0.5 MG: 0.5 INHALANT RESPIRATORY (INHALATION) at 19:55

## 2023-10-23 RX ADMIN — ACETAMINOPHEN 975 MG: 325 TABLET ORAL at 10:58

## 2023-10-23 RX ADMIN — DEXTROSE MONOHYDRATE 25 G: 25 INJECTION, SOLUTION INTRAVENOUS at 08:13

## 2023-10-23 RX ADMIN — FOLIC ACID 1 MG: 1 TABLET ORAL at 08:12

## 2023-10-23 ASSESSMENT — COGNITIVE AND FUNCTIONAL STATUS - GENERAL
EATING MEALS: A LITTLE
CLIMB 3 TO 5 STEPS WITH RAILING: A LITTLE
STANDING UP FROM CHAIR USING ARMS: A LITTLE
MOVING TO AND FROM BED TO CHAIR: A LITTLE
CLIMB 3 TO 5 STEPS WITH RAILING: A LITTLE
MOBILITY SCORE: 18
TOILETING: A LITTLE
DRESSING REGULAR UPPER BODY CLOTHING: A LITTLE
EATING MEALS: A LITTLE
MOVING TO AND FROM BED TO CHAIR: A LITTLE
PERSONAL GROOMING: A LITTLE
PERSONAL GROOMING: A LITTLE
WALKING IN HOSPITAL ROOM: A LITTLE
MOBILITY SCORE: 18
WALKING IN HOSPITAL ROOM: A LITTLE
STANDING UP FROM CHAIR USING ARMS: A LITTLE
HELP NEEDED FOR BATHING: A LITTLE
HELP NEEDED FOR BATHING: A LITTLE
MOVING FROM LYING ON BACK TO SITTING ON SIDE OF FLAT BED WITH BEDRAILS: A LITTLE
DAILY ACTIVITIY SCORE: 18
TURNING FROM BACK TO SIDE WHILE IN FLAT BAD: A LITTLE
DRESSING REGULAR UPPER BODY CLOTHING: A LITTLE
DAILY ACTIVITIY SCORE: 18
TURNING FROM BACK TO SIDE WHILE IN FLAT BAD: A LITTLE
MOVING FROM LYING ON BACK TO SITTING ON SIDE OF FLAT BED WITH BEDRAILS: A LITTLE
TOILETING: A LITTLE
DRESSING REGULAR LOWER BODY CLOTHING: A LITTLE
DRESSING REGULAR LOWER BODY CLOTHING: A LITTLE

## 2023-10-23 ASSESSMENT — PAIN SCALES - GENERAL
PAINLEVEL_OUTOF10: 0 - NO PAIN
PAINLEVEL_OUTOF10: 0 - NO PAIN

## 2023-10-23 ASSESSMENT — PAIN - FUNCTIONAL ASSESSMENT
PAIN_FUNCTIONAL_ASSESSMENT: 0-10
PAIN_FUNCTIONAL_ASSESSMENT: 0-10

## 2023-10-23 NOTE — TELEPHONE ENCOUNTER
LAURA...CALLED PATIENT AGAIN, DID NOT RETURN CALL - SHE HAS BEEN ADMITTED INTO [facility] FOR SEVERAL COMPRESSION FRACTURES IN HER BACK - SHE WILL BE POSSIBLY GOING TO [facility] IN [place] UPON DISCHARGE FOR REHAB

## 2023-10-23 NOTE — DISCHARGE SUMMARY
Discharge Diagnosis  Disorientation    Issues Requiring Follow-Up  Follow up with PCP within 1 week of discharge  Follow up with Neurosurgery Dr. Claudio Kearney within 2 weeks of discharge  Follow up with Endocrinology within 2 weeks of discharge  Follow up with Podiatry within 1 week of discharge    Discharge Meds     Your medication list        CONTINUE taking these medications        Instructions Last Dose Given Next Dose Due   Dexcom G6  misc  Generic drug: Dexcom G4 platinum       Use as instructed       FreeStyle Bradley 3 Sensor device  Generic drug: blood-glucose sensor      USE AS DIRECTED TO TEST BLOOD GLUCOSE. CHANGE EVERY 14 DAYS       Dexcom G6 Sensor device  Generic drug: blood-glucose sensor      Use to check sugars 3 times daily       Dexcom G6 Transmitter device  Generic drug: Dexcom G4 platinum transmitter      Use as instructed              ASK your doctor about these medications        Instructions Last Dose Given Next Dose Due   amLODIPine 2.5 mg tablet  Commonly known as: Norvasc           atorvastatin 40 mg tablet  Commonly known as: Lipitor      Take 1 tablet (40 mg) by mouth once daily.       belimumab 120 mg recon soln IV injection  Commonly known as: Benlysta      Infuse 10mg/kg (900mg) IV For Maintenance: every 4 weeks       dronabinol 2.5 mg capsule  Commonly known as: Marinol  Ask about: Which instructions should I use?      TAKE 1 CAPSULE BY MOUTH TWO TIMES A DAY       Eliquis 5 mg tablet  Generic drug: apixaban      TAKE 1 TABLET (5 MG) BY MOUTH TWO TIMES A DAY       fludrocortisone 0.1 mg tablet  Commonly known as: Florinef      Take 1 tablet (0.1 mg) by mouth every other day.       folic acid 1 mg tablet  Commonly known as: Folvite      TAKE 1 TABLET BY MOUTH EVERY DAY       loperamide 2 mg capsule  Commonly known as: Imodium  Ask about: Which instructions should I use?      TAKE 1 CAPSULE BY MOUTH THREE TIMES A DAY WITH A MEAL FOR LOOSE STOOLS       melatonin 5 mg  tablet           multivitamin tablet           mycophenolate 500 mg tablet  Commonly known as: Cellcept           OneTouch Verio test strips strip  Generic drug: blood sugar diagnostic      1 strip in the morning, at noon, in the evening, and at bedtime.       pantoprazole 40 mg EC tablet  Commonly known as: ProtoNix      TAKE 1 TABLET BY MOUTH EVERY DAY       predniSONE 5 mg tablet  Commonly known as: Deltasone      TAKE 1 TABLET BY MOUTH THREE TIMES A DAY       risperiDONE 0.5 mg tablet  Commonly known as: RisperDAL  Ask about: Which instructions should I use?      TAKE 1 TABLET BY MOUTH AT BEDTIME       torsemide 10 mg tablet  Commonly known as: Demadex      TAKE 1 TABLET BY MOUTH ONCE DAILY       Trelegy Ellipta 200-62.5-25 mcg blister with device  Generic drug: fluticasone-umeclidin-vilanter      INHALE 1 PUFF BY MOUTH ONCE DAILY                Test Results Pending At Discharge  Pending Labs       Order Current Status    C-Peptide In process    Sulfonylurea Hypoglycemics,Serum In process            Hospital Course   Bing Holliday is a 62 y.o. female presenting with for evaluation of progressive lower extremity weakness, intermittent urinary incontinence, as well as a sharp frontal headache which began 90 minutes prior to initial evaluation.  Initial evaluation in the emergency department which did not include consultation with neurology service failed to reveal any significant pathology.  This included CT scan of the axial spine.  UA obtained consistent with cystitis.  At the time of my assessment, patient is resting comfortably in her bed.  She denies any persistent headache.  Does endorse several episodes of urinary incontinence as well as they describe lower extremity weakness.  States that she feels very weak but has been participating with home health care and home physical therapy.  Patient lives at home with her son and at baseline ambulates with a walker. Admitted for further evaluation.   Hospital  course: Compression Fracture of Thoracic Spine/Lower Extremity Weakness secondary to lumbar spinal stenosis  - reviewed Neurosurgery note; plan for outpatient follow up.  Urine showed multiple organisms present she was treated with Rocephin.    Patient with several  admission in the past with un symptomatic hypoglycemia. She could not get into Endocrine until 2024, call was made to endocrinology and will see outpatient. Labs were ordered for their follow up appt. Evaluated by PT and recommended for SNF, patient is in agreement with SNF placement  Seen by podiatry for 4th digit wound, will need follow up outpatient within 1 week and for dressing changes  Pertinent Physical Exam At Time of Discharge  Physical Exam  Musculoskeletal:         General: Signs of injury present.       Constitutional: Well developed, awake/alert/oriented x3, no distress, alert and cooperative  HEENT: anicteric sclera, eomi, no oral lesions noted, moist orophraynx, no thyromegaly  Cardiovascular: Regular, rate and rhythm, no murmurs, 2+ equal pulses of the extremities, normal S1 and S2  Respiratory/Thorax: Patent airways, CTAB, normal breath sounds with good chest expansion, thorax symmetric, no conversational dyspnea  Gastrointestinal: +BS, Nondistended, soft, non-tender, no rebound tenderness or guarding, no masses palpable, no organomegaly  Musculoskeletal: +chronic b/l hand contractures, limited ROM b/l LE, decreased strength b/l  Extremities: no edema  Skin: warm and dry. No rashes nor lesions noted  Neurological: alert and oriented x3, intact senses, motor, follows commands, clear speech, no facial droop  Outpatient Follow-Up  Future Appointments   Date Time Provider Department Center   11/1/2023  2:00 PM PHARMACY [facility] [id number] [facility]   11/7/2023  4:00 PM Sarah Castellanos DO [id number] [facility]   2/13/2024 10:30 AM Michael Arenas MD [id number] [facility]         Angela Cote, JIM-ROSA ISELA Cote, [facility]  [facility]  [address] [address]  Phone: [phone] Fax: [phone]

## 2023-10-23 NOTE — DISCHARGE INSTRUCTIONS
Follow up with PCP within 1 week of discharge  Follow up with Dr. Kearney, Neurosurgery within 2 weeks of discharge  Follow up with Dr. Sloan, Endocrinology, within 2 weeks of discharge  Resume home medications  Glucose monitoring  Wound care: benadine, esperanza, border foam every other day, follow up with podiatry within 1 week of discharge

## 2023-10-23 NOTE — PROGRESS NOTES
"Bing Holliday is a 62 y.o. female on day 3 of admission presenting with Disorientation.    Subjective   Pt examined and evaluated at bedside, found resting comfortably.  Pt states that their pain is well controlled, denies any constitutional symptoms, and has no other complaints at this time.        Objective   Vascular: DP and PT pulses palpable 1/4 bilaterally.  CFT under 5 seconds when tested at distal digits.  Skin temp warm to warm from proximal to distal.  +2 pitting edema noted to BLE.     Neuro: Protective sensation diminished, light tough sensation intact.  No Tinel's or Valleix's signs elicited.       Derm:  Full thickness non pressure ulcer noted to dorsal 4th digit PIPJ.  No probe to bone or exposed tendon noted at this time.  Wound base is nearly 100% fibrotic with mild serosanguineous drainage no purulence, erythema, proximal streaking, or other SOI noted at this time.  Hyperkeratotic tissue noted to lateral aspect of left plantar foot at fifth metatarsal styloid process, no open lesion upon debridement.  Hyperkeratotic tissue noted to subsecond metatarsal head right foot, no open lesion upon debridement.  No subcutaneous nodules.  Interdigital spaces are CDI.     Musc: Rigid cavus deformity noted to left foot, with nonreducible hammertoe deformities noted to digits 2 through 5 bilaterally.  No other gross deformities noted.  No POP noted to any other area of the foot/ankle.       Last Recorded Vitals  Blood pressure 128/61, pulse 87, temperature 35 °C (95 °F), temperature source Temporal, resp. rate 17, height 1.803 m (5' 11\"), weight 94.6 kg (208 lb 8.9 oz), SpO2 96 %.  Intake/Output last 3 Shifts:  I/O last 3 completed shifts:  In: 2596 (27.4 mL/kg) [P.O.:716; I.V.:1730 (18.3 mL/kg); IV Piggyback:150]  Out: 2900 (30.7 mL/kg) [Urine:2900 (0.9 mL/kg/hr)]  Weight: 94.6 kg     Relevant Results  Scheduled medications  acetaminophen, 975 mg, oral, q8h  amLODIPine, 2.5 mg, oral, Daily  apixaban, 2.5 mg, " oral, BID  atorvastatin, 40 mg, oral, Nightly  budesonide, 0.5 mg, nebulization, BID  cefTRIAXone, 1 g, intravenous, q24h  fludrocortisone, 0.1 mg, oral, Every other day  folic acid, 1 mg, oral, Daily  insulin lispro, 0-10 Units, subcutaneous, Before meals & nightly  ipratropium-albuteroL, 3 mL, nebulization, TID  lidocaine, 1 patch, transdermal, Daily  melatonin, 5 mg, oral, Nightly  multivitamin with minerals, 1 tablet, oral, Daily  mycophenolate, 500 mg, oral, BID  pantoprazole, 40 mg, oral, Daily before breakfast   Or  pantoprazole, 40 mg, intravenous, Daily before breakfast  predniSONE, 5 mg, oral, Daily  risperiDONE, 0.5 mg, oral, Nightly  [Held by provider] torsemide, 10 mg, oral, Daily      Continuous medications  sodium chloride 0.9%, 100 mL/hr, Last Rate: 100 mL/hr (10/23/23 0924)      PRN medications  PRN medications: dextrose, glucagon, loperamide, melatonin, metoclopramide **OR** metoclopramide, ondansetron ODT **OR** ondansetron, oxyCODONE, polyethylene glycol      Results for orders placed or performed during the hospital encounter of 10/19/23 (from the past 24 hour(s))   POCT GLUCOSE   Result Value Ref Range    POCT Glucose 162 (H) 74 - 99 mg/dL   POCT GLUCOSE   Result Value Ref Range    POCT Glucose 125 (H) 74 - 99 mg/dL   POCT GLUCOSE   Result Value Ref Range    POCT Glucose 42 (L) 74 - 99 mg/dL   CBC   Result Value Ref Range    WBC 3.9 (L) 4.4 - 11.3 x10*3/uL    nRBC 0.0 0.0 - 0.0 /100 WBCs    RBC 3.01 (L) 4.00 - 5.20 x10*6/uL    Hemoglobin 8.9 (L) 12.0 - 16.0 g/dL    Hematocrit 30.2 (L) 36.0 - 46.0 %     80 - 100 fL    MCH 29.6 26.0 - 34.0 pg    MCHC 29.5 (L) 32.0 - 36.0 g/dL    RDW 18.4 (H) 11.5 - 14.5 %    Platelets 104 (L) 150 - 450 x10*3/uL    MPV 13.5 (H) 7.5 - 11.5 fL   Magnesium   Result Value Ref Range    Magnesium 1.73 1.60 - 2.40 mg/dL   Basic Metabolic Panel   Result Value Ref Range    Glucose 48 (LL) 74 - 99 mg/dL    Sodium 143 136 - 145 mmol/L    Potassium 4.8 3.5 - 5.3 mmol/L     Chloride 114 (H) 98 - 107 mmol/L    Bicarbonate 23 21 - 32 mmol/L    Anion Gap 11 10 - 20 mmol/L    Urea Nitrogen 46 (H) 6 - 23 mg/dL    Creatinine 1.70 (H) 0.50 - 1.05 mg/dL    eGFR 34 (L) >60 mL/min/1.73m*2    Calcium 8.6 8.6 - 10.3 mg/dL   Beta Hydroxybutyrate   Result Value Ref Range    Beta-Hydroxybutyrate 0.07 0.02 - 0.27 mmol/L   C-Peptide   Result Value Ref Range    C-Peptide 3.0 0.7 - 3.9 ng/mL   POCT GLUCOSE   Result Value Ref Range    POCT Glucose 62 (L) 74 - 99 mg/dL   POCT GLUCOSE   Result Value Ref Range    POCT Glucose 87 74 - 99 mg/dL      CT angio brain attack head w and wo IV contrast and post procedure          Interpreted By:  Jairo Moreno,   STUDY:  CT ANGIO BRAIN ATTACK HEAD W AND WO IV CONTRAST AND POST PROCEDURE;  CT ANGIO BRAIN ATTACK NECK W IV CONTRAST AND POST PROCEDURE;  10/19/2023 2:57 pm      INDICATION:  Signs/Symptoms:hemorrhagic stroke alert; Signs/Symptoms:bat.      COMPARISON:  Head CT, same day      ACCESSION NUMBER(S):  [id number]; [id number]      ORDERING CLINICIAN:  JADA BURRELL      TECHNIQUE:  50 mL Omnipaque 350 was administered intravenously and axial images  of the head and neck were acquired. Coronal and sagittal MIPs and 3-D  reconstructions were created on an independent workstation and  provided for review. Image quality is significantly degraded by poor  bolus timing.      FINDINGS:  CTA Head:      There is minimal arterial phase enhancement, no proximal large vessel  occlusion in the mjtutw-[id number]-[place].      CTA Neck:      There is minimal arterial phase enhancement, no high-grade narrowing  or occlusion of the major cervical arteries.      The soft tissues of the neck are unremarkable. The visualized lungs  are clear. Dental caries with associated periapical lucency in the  left maxilla. Degenerative changes in the spine and partially  visualized glenohumeral joints.      IMPRESSION:  Limited exam due to poor bolus timing, no large vessel occlusion  in  the head or neck.      MACRO:  None      Signed by: Jairo Moreno 10/19/2023 3:08 PM  Dictation workstation:   [id number]         CBC and Auto Differential        Component Value Flag Ref Range Units Status    WBC 3.7      4.4 - 11.3 x10*3/uL Final    nRBC 0.0      0.0 - 0.0 /100 WBCs Final    RBC 3.20      4.00 - 5.20 x10*6/uL Final    Hemoglobin 9.2      12.0 - 16.0 g/dL Final    Hematocrit 30.8      36.0 - 46.0 % Final    MCV 96      80 - 100 fL Final    MCH 28.8      26.0 - 34.0 pg Final    MCHC 29.9      32.0 - 36.0 g/dL Final    RDW 17.9      11.5 - 14.5 % Final    Platelets 100      150 - 450 x10*3/uL Final    MPV 13.5      7.5 - 11.5 fL Final    Immature Granulocytes %, Automated 0.0      0.0 - 0.9 % Final    Comment:    Immature Granulocyte Count (IG) includes promyelocytes, myelocytes and metamyelocytes but does not include bands. Percent differential counts (%) should be interpreted in the context of the absolute cell counts (cells/UL).    Immature Granulocytes Absolute, Automated 0.00      0.00 - 0.70 x10*3/uL Final                  Comprehensive metabolic panel        Component Value Flag Ref Range Units Status    Glucose 103      74 - 99 mg/dL Final    Sodium 141      136 - 145 mmol/L Final    Potassium 5.4      3.5 - 5.3 mmol/L Final    Chloride 113      98 - 107 mmol/L Final    Bicarbonate 23      21 - 32 mmol/L Final    Anion Gap 10      10 - 20 mmol/L Final    Urea Nitrogen 55      6 - 23 mg/dL Final    Creatinine 1.47      0.50 - 1.05 mg/dL Final    eGFR 40      >60 mL/min/1.73m*2 Final    Comment:    Calculations of estimated GFR are performed using the 2021 CKD-EPI Study Refit equation without the race variable for the IDMS-Traceable creatinine methods.  https://jasn.asnjournals.org/content/early/2021/09/22/ASN.2204995014    Calcium 8.9      8.6 - 10.3 mg/dL Final    Albumin 3.2      3.4 - 5.0 g/dL Final    Alkaline Phosphatase 98      33 - 136 U/L Final    Total Protein 6.2      6.4 -  8.2 g/dL Final    AST 37      9 - 39 U/L Final    Bilirubin, Total 0.2      0.0 - 1.2 mg/dL Final    ALT 28      7 - 45 U/L Final    Comment:    Patients treated with Sulfasalazine may generate falsely decreased results for ALT.                  Troponin I, High Sensitivity        Component Value Flag Ref Range Units Status    Troponin I, High Sensitivity 12      0 - 13 ng/L Final                  Protime-INR        Component Value Flag Ref Range Units Status    Protime 14.9      9.8 - 12.8 seconds Final    INR 1.3      0.9 - 1.1  Final                  APTT        Component Value Flag Ref Range Units Status    aPTT 55      27 - 38 seconds Final                  ECG 12 lead        Component Value Flag Ref Range Units Status    Ventricular Rate 57       BPM Final    Atrial Rate 57       BPM Final    IN Interval 184       ms Final    QRS Duration 100       ms Final    QT Interval 454       ms Final    QTC Calculation(Bazett) 441       ms Final    P Axis 56       degrees Final    R Axis -19       degrees Final    T Axis 21       degrees Final    QRS Count 10       beats Final    Q Onset 217       ms Final    P Onset 125       ms Final    P Offset 185       ms Final    T Offset 444       ms Final    QTC Fredericia 446       ms Final                 Sinus bradycardia  Voltage criteria for left ventricular hypertrophy  Abnormal ECG  When compared with ECG of 30-AUG-2023 09:00,  Sinus rhythm has replaced Atrial fibrillation  Vent. rate has decreased BY  37 BPM  QRS duration has increased  Criteria for Septal infarct are no longer Present  Criteria for Inferior infarct are no longer Present  ST elevation has replaced ST depression in Lateral leads  Confirmed by Caterina Dixon ([id number]) on 10/20/2023 11:20:43 PM         TSH with reflex to Free T4 if abnormal        Component Value Flag Ref Range Units Status    Thyroid Stimulating Hormone 4.72      0.44 - 3.98 mIU/L Final                  Magnesium        Component Value  Flag Ref Range Units Status    Magnesium 1.88      1.60 - 2.40 mg/dL Final                  XR chest 1 view          Interpreted By:  Nic Moseley,   STUDY:  XR CHEST 1 VIEW  10/19/2023 3:11 pm      INDICATION:  Signs/Symptoms:bat weakness      COMPARISON:  08/27/2023      ACCESSION NUMBER(S):  [id number]      ORDERING CLINICIAN:  JADA BURRELL      TECHNIQUE:  A single AP portable radiograph of the chest was obtained.      FINDINGS:  Multiple cardiac monitoring leads are seen over the chest.   No focal  infiltrate, pleural effusion or pneumothorax is identified. The  cardiac silhouette is prominent, similar to prior studies.      IMPRESSION:  No focal infiltrate or pneumothorax is identified.      MACRO:  None.      Signed by: Nic Moseley 10/19/2023 3:13 PM  Dictation workstation:   [id number]         Urinalysis with Reflex Microscopic and Culture        Component Value Flag Ref Range Units Status    Color, Urine Yellow      Straw, Yellow  Final    Appearance, Urine Hazy   (Normal)   Clear  Final    Specific Gravity, Urine 1.006      1.005 - 1.035  Final    pH, Urine 6.0      5.0, 5.5, 6.0, 6.5, 7.0, 7.5, 8.0  Final    Protein, Urine 30 (1+)   (Normal)   NEGATIVE mg/dL Final    Glucose, Urine NEGATIVE      NEGATIVE mg/dL Final    Blood, Urine NEGATIVE      NEGATIVE  Final    Ketones, Urine NEGATIVE      NEGATIVE mg/dL Final    Bilirubin, Urine NEGATIVE      NEGATIVE  Final    Urobilinogen, Urine <2.0      <2.0 mg/dL Final    Nitrite, Urine NEGATIVE      NEGATIVE  Final    Leukocyte Esterase, Urine LARGE (3+)      NEGATIVE  Final                  Extra Urine Gray Tube        Component    Extra Tube    Hold for add-ons.      Comment:    Auto resulted.                  CT angio brain attack neck w IV contrast and post procedure          Interpreted By:  Jairo Moreno,   STUDY:  CT ANGIO BRAIN ATTACK HEAD W AND WO IV CONTRAST AND POST PROCEDURE;  CT ANGIO BRAIN ATTACK NECK W IV CONTRAST AND POST  PROCEDURE;  10/19/2023 2:57 pm      INDICATION:  Signs/Symptoms:hemorrhagic stroke alert; Signs/Symptoms:bat.      COMPARISON:  Head CT, same day      ACCESSION NUMBER(S):  [id number]; [id number]      ORDERING CLINICIAN:  JADA BURRELL      TECHNIQUE:  50 mL Omnipaque 350 was administered intravenously and axial images  of the head and neck were acquired. Coronal and sagittal MIPs and 3-D  reconstructions were created on an independent workstation and  provided for review. Image quality is significantly degraded by poor  bolus timing.      FINDINGS:  CTA Head:      There is minimal arterial phase enhancement, no proximal large vessel  occlusion in the [id number].      CTA Neck:      There is minimal arterial phase enhancement, no high-grade narrowing  or occlusion of the major cervical arteries.      The soft tissues of the neck are unremarkable. The visualized lungs  are clear. Dental caries with associated periapical lucency in the  left maxilla. Degenerative changes in the spine and partially  visualized glenohumeral joints.      IMPRESSION:  Limited exam due to poor bolus timing, no large vessel occlusion in  the head or neck.      MACRO:  None      Signed by: Jairo Moreno 10/19/2023 3:08 PM  Dictation workstation:   [id number]         CT brain attack head wo IV contrast          Interpreted By:  Gaston Beyer,   STUDY:  CT BRAIN ATTACK HEAD WO IV CONTRAST;  10/19/2023 2:38 pm      INDICATION:  Signs/Symptoms:Stroke Evaluation.      COMPARISON:  08/28/2023      ACCESSION NUMBER(S):  [id number]      ORDERING CLINICIAN:  JADA BURRELL      TECHNIQUE:  Sequential trans axial images were obtained  .      FINDINGS:  INTRACRANIAL:      CORTICAL SULCI AND EXTRA-AXIAL SPACES:  Unremarkable.      VENTRICULAR SYSTEM:  Unremarkable without significant dilatation.      CEREBRAL PARENCHYMA:  There is mild hypodensity at the long tracks of  the white matter, particularly in the frontoparietal regions  greater  on the left similar to the prior exam most likely due to gliosis from  arteriolar disease.There is also a small area of  encephalomalacia/gliosis at the left occipital lobe most likely from  a remote infarction, also similar to the prior exam. Otherwisethere  is no evidence of definite subacute infarction, intracranial  hemorrhage or mass.      EXTRACRANIAL:  Visualized paranasal sinuses and mastoids are clear.  The calvarium is intact.      IMPRESSION:  Mild age related degenerative change as described without acute  findings or significant change from the prior exam. No intracranial  hemorrhage.      MACRO:  Gaston Beyer discussed the significance and urgency of this critical  finding by secure chat with  JADA BURRELL on 10/19/2023 at 2:42  pm.  (**-RCF-**) Findings:  See findings.          Signed by: Gaston Beyer 10/19/2023 2:44 PM  Dictation workstation:   [id number]         POCT GLUCOSE        Component Value Flag Ref Range Units Status    POCT Glucose 101      74 - 99 mg/dL Final                  Cardiac Enzymes - CPK        Component Value Flag Ref Range Units Status    Creatine Kinase 75      0 - 215 U/L Final                  Thyroxine, Free        Component Value Flag Ref Range Units Status    Thyroxine, Free 0.82      0.61 - 1.12 ng/dL Final                  Manual Differential        Component Value Flag Ref Range Units Status    Neutrophils %, Manual 74.0      40.0 - 80.0 % Final    Comment:    Percent differential counts (%) should be interpreted in the context of the absolute cell counts (cells/uL).    Bands %, Manual 2.0      0.0 - 5.0 % Final    Lymphocytes %, Manual 15.0      13.0 - 44.0 % Final    Monocytes %, Manual 6.0      2.0 - 10.0 % Final    Eosinophils %, Manual 1.0      0.0 - 6.0 % Final    Basophils %, Manual 2.0      0.0 - 2.0 % Final    Seg Neutrophils Absolute, Manual 2.74      1.20 - 7.00 x10*3/uL Final    Bands Absolute, Manual 0.07      0.00 - 0.70 x10*3/uL Final     Lymphocytes Absolute, Manual 0.56      1.20 - 4.80 x10*3/uL Final    Monocytes Absolute, Manual 0.22      0.10 - 1.00 x10*3/uL Final    Eosinophils Absolute, Manual 0.04      0.00 - 0.70 x10*3/uL Final    Basophils Absolute, Manual 0.07      0.00 - 0.10 x10*3/uL Final    Total Cells Counted 100        Final    Neutrophils Absolute, Manual 2.81      1.20 - 7.70 x10*3/uL Final    RBC Morphology See Below        Final    RBC Fragments Few        Final    Teardrop Cells Few        Final    [place] Cells Few        Final    Giant Platelets Few        Final                  CT cervical spine wo IV contrast          Interpreted By:  Mariia Concepcion,   STUDY:  CT CERVICAL SPINE WO IV CONTRAST; CT LUMBAR SPINE WO IV CONTRAST; CT  THORACIC SPINE WO IV CONTRAST;  10/19/2023 5:07 pm      INDICATION:  Signs/Symptoms:per neuro request; Signs/Symptoms:per neuro request  for lower extremity weakness.      COMPARISON:  None.      ACCESSION NUMBER(S):  [id number]; [id number]; [id number]      ORDERING CLINICIAN:  JADA BURRELL      TECHNIQUE:  Axial CT images of the cervical spine are obtained. Axial, coronal  and sagittal reconstructions are provided for review. Axial CT images  of the thoracic spine are obtained. Axial, coronal and sagittal  reconstructions are provided for review.Axial CT images of the lumbar  spine are obtained. Axial, coronal and sagittal reconstructions are  provided for review.      FINDINGS:  Cervical spine: No acute fracture or subluxation. Reversal of normal  cervical lordosis in the midcervical spine. Mild-moderate disc height  loss seen at C3-C4, C4-C5, C5-C6, and C6-C7 with multilevel endplate  erosions, greatest at C5-C6 and C6-C7. Multilevel marginal  osteophytes C3-C7. Multilevel facet arthropathy in the cervical  spine. Moderate right-sided neural foraminal narrowing at C3-C4,  severe on the left at C4-C5, mild on the left at C5-C6, and moderate  bilaterally at C6-C7. No prevertebral  hematoma.      Thoracic spine: Moderate compression deformity in T7 vertebral body,  age indeterminate. Levocurvature in the lower thoracic spine.  Scattered facet arthropathy. Multilevel vacuum phenomenon T9-T10,  T10-T11, and T11-T12. Subcentimeter bone island in T1 vertebral body.  There is dense material in the urinary system bilaterally suggestive  of excreted contrast. Heavy coronary artery calcifications. Streaky  opacities in the lungs dependently.      Lumbar spine: Multilevel advanced disc height loss L2-L3, L3-L4, and  L4-L5 with endplate erosions and marginal osteophyte formation.  Levoscoliosis of the lumbar spine. Facet arthropathy in the lumbar  spine, greatest inferiorly. Multilevel spinal canal stenosis in the  lower lumbar spine, at L2-L3 measuring 6 mm, at L3-L4 measuring 5 mm,  and at L4-L5 measuring 4 mm. Bilateral neural foraminal narrowing is  also seen at these levels. Partially visualized right hip  arthroplasty with associated streak artifact. Sigmoid colonic  diverticulosis. Atherosclerosis.      IMPRESSION:  No acute osseous abnormality in the cervical spine. Multilevel  degenerative change and neural foraminal narrowing.      Moderate age-indeterminate compression fracture T7 vertebral body.      No acute osseous abnormality in the lumbar spine. Multilevel severe  canal stenosis as well as neural foraminal narrowing.      Signed by: Mariia Concepcion 10/19/2023 6:06 PM  Dictation workstation:   [id number]         CT thoracic spine wo IV contrast          Interpreted By:  Mariia Concepcion,   STUDY:  CT CERVICAL SPINE WO IV CONTRAST; CT LUMBAR SPINE WO IV CONTRAST; CT  THORACIC SPINE WO IV CONTRAST;  10/19/2023 5:07 pm      INDICATION:  Signs/Symptoms:per neuro request; Signs/Symptoms:per neuro request  for lower extremity weakness.      COMPARISON:  None.      ACCESSION NUMBER(S):  [id number]; [id number]; [id number]      ORDERING CLINICIAN:  JADA BURRELL      TECHNIQUE:  Axial  CT images of the cervical spine are obtained. Axial, coronal  and sagittal reconstructions are provided for review. Axial CT images  of the thoracic spine are obtained. Axial, coronal and sagittal  reconstructions are provided for review.Axial CT images of the lumbar  spine are obtained. Axial, coronal and sagittal reconstructions are  provided for review.      FINDINGS:  Cervical spine: No acute fracture or subluxation. Reversal of normal  cervical lordosis in the midcervical spine. Mild-moderate disc height  loss seen at C3-C4, C4-C5, C5-C6, and C6-C7 with multilevel endplate  erosions, greatest at C5-C6 and C6-C7. Multilevel marginal  osteophytes C3-C7. Multilevel facet arthropathy in the cervical  spine. Moderate right-sided neural foraminal narrowing at C3-C4,  severe on the left at C4-C5, mild on the left at C5-C6, and moderate  bilaterally at C6-C7. No prevertebral hematoma.      Thoracic spine: Moderate compression deformity in T7 vertebral body,  age indeterminate. Levocurvature in the lower thoracic spine.  Scattered facet arthropathy. Multilevel vacuum phenomenon T9-T10,  T10-T11, and T11-T12. Subcentimeter bone island in T1 vertebral body.  There is dense material in the urinary system bilaterally suggestive  of excreted contrast. Heavy coronary artery calcifications. Streaky  opacities in the lungs dependently.      Lumbar spine: Multilevel advanced disc height loss L2-L3, L3-L4, and  L4-L5 with endplate erosions and marginal osteophyte formation.  Levoscoliosis of the lumbar spine. Facet arthropathy in the lumbar  spine, greatest inferiorly. Multilevel spinal canal stenosis in the  lower lumbar spine, at L2-L3 measuring 6 mm, at L3-L4 measuring 5 mm,  and at L4-L5 measuring 4 mm. Bilateral neural foraminal narrowing is  also seen at these levels. Partially visualized right hip  arthroplasty with associated streak artifact. Sigmoid colonic  diverticulosis. Atherosclerosis.      IMPRESSION:  No acute  osseous abnormality in the cervical spine. Multilevel  degenerative change and neural foraminal narrowing.      Moderate age-indeterminate compression fracture T7 vertebral body.      No acute osseous abnormality in the lumbar spine. Multilevel severe  canal stenosis as well as neural foraminal narrowing.      Signed by: Mariia Concepcion 10/19/2023 6:06 PM  Dictation workstation:   [id number]         CT lumbar spine wo IV contrast          Interpreted By:  Mariia Concepcion,   STUDY:  CT CERVICAL SPINE WO IV CONTRAST; CT LUMBAR SPINE WO IV CONTRAST; CT  THORACIC SPINE WO IV CONTRAST;  10/19/2023 5:07 pm      INDICATION:  Signs/Symptoms:per neuro request; Signs/Symptoms:per neuro request  for lower extremity weakness.      COMPARISON:  None.      ACCESSION NUMBER(S):  [id number]; [id number]; [id number]      ORDERING CLINICIAN:  JADA BURRELL      TECHNIQUE:  Axial CT images of the cervical spine are obtained. Axial, coronal  and sagittal reconstructions are provided for review. Axial CT images  of the thoracic spine are obtained. Axial, coronal and sagittal  reconstructions are provided for review.Axial CT images of the lumbar  spine are obtained. Axial, coronal and sagittal reconstructions are  provided for review.      FINDINGS:  Cervical spine: No acute fracture or subluxation. Reversal of normal  cervical lordosis in the midcervical spine. Mild-moderate disc height  loss seen at C3-C4, C4-C5, C5-C6, and C6-C7 with multilevel endplate  erosions, greatest at C5-C6 and C6-C7. Multilevel marginal  osteophytes C3-C7. Multilevel facet arthropathy in the cervical  spine. Moderate right-sided neural foraminal narrowing at C3-C4,  severe on the left at C4-C5, mild on the left at C5-C6, and moderate  bilaterally at C6-C7. No prevertebral hematoma.      Thoracic spine: Moderate compression deformity in T7 vertebral body,  age indeterminate. Levocurvature in the lower thoracic spine.  Scattered facet arthropathy.  Multilevel vacuum phenomenon T9-T10,  T10-T11, and T11-T12. Subcentimeter bone island in T1 vertebral body.  There is dense material in the urinary system bilaterally suggestive  of excreted contrast. Heavy coronary artery calcifications. Streaky  opacities in the lungs dependently.      Lumbar spine: Multilevel advanced disc height loss L2-L3, L3-L4, and  L4-L5 with endplate erosions and marginal osteophyte formation.  Levoscoliosis of the lumbar spine. Facet arthropathy in the lumbar  spine, greatest inferiorly. Multilevel spinal canal stenosis in the  lower lumbar spine, at L2-L3 measuring 6 mm, at L3-L4 measuring 5 mm,  and at L4-L5 measuring 4 mm. Bilateral neural foraminal narrowing is  also seen at these levels. Partially visualized right hip  arthroplasty with associated streak artifact. Sigmoid colonic  diverticulosis. Atherosclerosis.      IMPRESSION:  No acute osseous abnormality in the cervical spine. Multilevel  degenerative change and neural foraminal narrowing.      Moderate age-indeterminate compression fracture T7 vertebral body.      No acute osseous abnormality in the lumbar spine. Multilevel severe  canal stenosis as well as neural foraminal narrowing.      Signed by: Mariia Concepcion 10/19/2023 6:06 PM  Dictation workstation:   [id number]         TSH with reflex to Free T4 if abnormal        Component Value Flag Ref Range Units Status    Thyroid Stimulating Hormone 4.65      0.44 - 3.98 mIU/L Final                  C3 complement        Component Value Flag Ref Range Units Status    C3 Complement 140      87 - 200 mg/dL Final                  C4 complement        Component Value Flag Ref Range Units Status    C4 Complement 54      10 - 50 mg/dL Final                  C-reactive protein        Component Value Flag Ref Range Units Status    C-Reactive Protein 0.63      <1.00 mg/dL Final                  Creatine Kinase        Component Value Flag Ref Range Units Status    Creatine Kinase 81       0 - 215 U/L Final                  Creatine Kinase, MB        Component Value Flag Ref Range Units Status    CKMB 2.5      <10.0 ng/mL Final    Comment:    REF VALUES  CKMB  <7 and RI <4% :Negative  CKMB  <7 and RI >4% :Equivocal  CKMB >=7 and RI <4% :Equivocal  CKMB >=7 and RI >4% :Positive      CK-MB Index         Final    Comment:    RI: 3%MB of CK                  Microscopic Only, Urine        Component Value Flag Ref Range Units Status    WBC, Urine 21-50      1-5, NONE /HPF Final    RBC, Urine 3-5      NONE, 1-2, 3-5 /HPF Final    Bacteria, Urine 2+      NONE SEEN /HPF Final                  Urine Culture        Specimen Information: Clean Catch/Voided; Urine      Urine Culture       Multiple organisms present, probable contamination. Repeat culture if clinically indicated.                                  Thyroxine, Free        Component Value Flag Ref Range Units Status    Thyroxine, Free 0.86      0.61 - 1.12 ng/dL Final                  Aldolase        Component Value Flag Ref Range Units Status    Aldolase 23.9      1.2 - 7.6 U/L Final    Comment:    This specimen is Hemolyzed. This may cause the results to be   falsely increased.  REFERENCE INTERVAL: Aldolase    Access complete set of age- and/or gender-specific reference   intervals for this test in the [facility] Test Directory   ([email or web address]).  Performed By: [facility]  [address] [place] [address]  : Peter Tejeda MD, PhD  CLIA Number: [id number]                  MR lumbar spine wo IV contrast          Interpreted By:  Lo Judge,   STUDY:  MR LUMBAR SPINE WO IV CONTRAST;  10/19/2023 9:16 pm      INDICATION:  Signs/Symptoms:incontinence new onset.      COMPARISON:  CT lumbar spine 10/19/2023, MRI 05/05/2021      ACCESSION NUMBER(S):  [id number]      ORDERING CLINICIAN:  JADA BURRELL      TECHNIQUE:  Sagittal T1, T2, STIR, axial T1 and T2 weighted images of the lumbar  spine were acquired.       FINDINGS:  Alignment: Reversal of the cervical curvature and dextroscoliosis  centered about L3, similar to prior imaging. Minimal retrolisthesis  of L3 on L4. Minimal retrolisthesis of L4 on L5.      Vertebrae/Intervertebral Discs: Multilevel chronic appearing  vertebral body height loss most prominent at L2, L3 and L4 similar to  prior imaging.Mild chronic endplate bone marrow signal changes. No  significant bone marrow edema identified. Probable osseous  demineralization. Multilevel intervertebral disc space narrowing and  disc desiccation without significant intervertebral disc space edema.      Conus: The lower thoracic cord appears unremarkable. The conus  terminates at L1.      T12-L1: Disc osteophyte complex and posterior ligamentous hypertrophy  with facet arthropathy resulting in mild-to-moderate canal narrowing.  Mild foraminal narrowing.      L1-2: Mild disc bulge and facet hypertrophy with moderate canal  narrowing and moderate left lateral recess stenosis. Mild right and  moderate left foraminal narrowing.      L2-3: Disc osteophyte complex and facet hypertrophy with moderate  canal and moderate to severe lateral recess stenosis. Mild to  moderate foraminal stenosis.      L3-4: Central to left paracentral disc osteophyte complex with  retrolisthesis, ligamentous hypertrophy and facet arthropathy  resulting in severe canal stenosis, similar are mildly progressed  from prior imaging. Moderate right and severe left foraminal stenosis.      L4-5: Disc bulge and posterior ligamentous hypertrophy with facet  arthropathy resulting in at least moderate canal narrowing. Mild to  moderate left and severe right foraminal stenosis.      L5-S1: Disc bulge and facet hypertrophy with mild canal narrowing and  moderate left lateral recess stenosis. Moderate right and mild left  foraminal narrowing.      The prevertebral and posterior paraspinous soft tissues are  unremarkable. A probable left renal cyst.       IMPRESSION:  Extensive degenerative changes with reversal of the lumbar lordosis  and severe L3-L4 canal stenosis, similar to slightly progressed from  05/05/2021. Additional findings of foraminal and lateral recess  narrowing as described.      No evidence of acute fracture or traumatic subluxation.      MACRO:  None      Signed by: Lo Judge 10/19/2023 10:00 PM  Dictation workstation:   [id number]         CBC        Component Value Flag Ref Range Units Status    WBC 4.4      4.4 - 11.3 x10*3/uL Final    nRBC 0.0      0.0 - 0.0 /100 WBCs Final    RBC 2.98      4.00 - 5.20 x10*6/uL Final    Hemoglobin 8.5      12.0 - 16.0 g/dL Final    Hematocrit 28.3      36.0 - 46.0 % Final    MCV 95      80 - 100 fL Final    MCH 28.5      26.0 - 34.0 pg Final    MCHC 30.0      32.0 - 36.0 g/dL Final    RDW 17.8      11.5 - 14.5 % Final    Platelets 100      150 - 450 x10*3/uL Final    MPV         Final    Comment:    Not Measured                  Comprehensive metabolic panel        Component Value Flag Ref Range Units Status    Glucose 61      74 - 99 mg/dL Final    Sodium 143      136 - 145 mmol/L Final    Potassium 5.1      3.5 - 5.3 mmol/L Final    Chloride 113      98 - 107 mmol/L Final    Bicarbonate 23      21 - 32 mmol/L Final    Anion Gap 12      10 - 20 mmol/L Final    Urea Nitrogen 56      6 - 23 mg/dL Final    Creatinine 1.52      0.50 - 1.05 mg/dL Final    eGFR 39      >60 mL/min/1.73m*2 Final    Comment:    Calculations of estimated GFR are performed using the 2021 CKD-EPI Study Refit equation without the race variable for the IDMS-Traceable creatinine methods.  https://jasn.asnjournals.org/content/early/2021/09/22/ASN.4160393056    Calcium 8.6      8.6 - 10.3 mg/dL Final    Albumin 3.1      3.4 - 5.0 g/dL Final    Alkaline Phosphatase 105      33 - 136 U/L Final    Total Protein 6.1      6.4 - 8.2 g/dL Final    AST 32      9 - 39 U/L Final    Bilirubin, Total 0.2      0.0 - 1.2 mg/dL Final    ALT 26      7  - 45 U/L Final    Comment:    Patients treated with Sulfasalazine may generate falsely decreased results for ALT.                  POCT GLUCOSE        Component Value Flag Ref Range Units Status    POCT Glucose 67      74 - 99 mg/dL Final                  MR thoracic spine wo IV contrast          Interpreted By:  Juan A Betancourt,   STUDY:  MR THORACIC SPINE WO IV CONTRAST; ;  10/20/2023 3:19 pm      INDICATION:  Signs/Symptoms:t7 compression fracture, bilateral lower extremity  weakness, intermittent urinary incontinence.      COMPARISON:  CT thoracic spine from 10/19/2023. MRI thoracic spine from 05/05/2021.      ACCESSION NUMBER(S):  [id number]      ORDERING CLINICIAN:  HARJEET SO      TECHNIQUE:  MRI of the thoracic spine was performed with acquisition of sagittal  T2, sagittal T1, sagittal STIR, axial T1, and axial T2 weighted  sequences.      FINDINGS:  There is mild S shaped curvature of the thoracic spine with  dextrocurvature superiorly and levocurvature inferiorly. There is  stable grade 1 anterolisthesis at T3-T4 and mild retrolisthesis at  T11-T12. Otherwise, there is no striking spondylolisthesis at any  level within the thoracic spine.      There is 40-45% height loss of the T7 vertebral body, unchanged since  the MRI from 05/05/2021. There is no retropulsion into the spinal  canal. Remaining thoracic vertebral body heights are maintained.  There is no edematous signal located within the visualized vertebral  bodies.      There is mild intervertebral disc height narrowing at few levels with  chronic degenerative endplate changes, similar to the prior MRI.      There are disc bulges/protrusions at multiple levels within the  thoracic spine which cause variable degree of ventral thecal sac  effacement but no spinal canal stenosis. Disc bulge/protrusion with  osteophytic spurring is most pronounced at the level of T11-T12,  unchanged. There is variable degree of neural foraminal narrowing at  multiple  levels due to degenerative changes including moderate  bilateral neural foraminal narrowing at T7-T8 and mild right neural  foraminal narrowing at T8-T9. There is stable marked bilateral neural  foraminal narrowing at T9-T10 and marked right neural foraminal  narrowing at T10-T11. There is stable marked bilateral neural  foraminal narrowing at T11-T12.      There is facet osteoarthropathy at multiple levels.      The thoracic spinal cord is normal in signal and caliber.      IMPRESSION:  1. Stable moderate compression fracture of the T7 vertebral body  compared to the MRI from 05/05/2021. There is no retropulsion into  the spinal canal. Remaining vertebral body heights are maintained.      2. Multilevel degenerative changes of the thoracic spine are  unchanged since the prior MRI.      This study was interpreted at [facility].          MACRO:  None      Signed by: Juan A Betancourt 10/20/2023 3:31 PM  Dictation workstation:   [id number]         POCT GLUCOSE        Component Value Flag Ref Range Units Status    POCT Glucose 48      74 - 99 mg/dL Final                  POCT GLUCOSE        Component Value Flag Ref Range Units Status    POCT Glucose 46      74 - 99 mg/dL Final    Comment:    RN/MD NOTIFIED                  POCT GLUCOSE        Component Value Flag Ref Range Units Status    POCT Glucose 130      74 - 99 mg/dL Final                  POCT GLUCOSE        Component Value Flag Ref Range Units Status    POCT Glucose 74      74 - 99 mg/dL Final                  CBC        Component Value Flag Ref Range Units Status    WBC 3.9      4.4 - 11.3 x10*3/uL Final    nRBC 0.0      0.0 - 0.0 /100 WBCs Final    RBC 3.02      4.00 - 5.20 x10*6/uL Final    Hemoglobin 8.7      12.0 - 16.0 g/dL Final    Hematocrit 29.4      36.0 - 46.0 % Final    MCV 97      80 - 100 fL Final    MCH 28.8      26.0 - 34.0 pg Final    MCHC 29.6      32.0 - 36.0 g/dL Final    RDW 18.2      11.5 - 14.5 % Final     Platelets 98      150 - 450 x10*3/uL Final    MPV 13.2      7.5 - 11.5 fL Final                  Basic Metabolic Panel        Component Value Flag Ref Range Units Status    Glucose 40      74 - 99 mg/dL Final    Sodium 143      136 - 145 mmol/L Final    Potassium 4.9      3.5 - 5.3 mmol/L Final    Chloride 110      98 - 107 mmol/L Final    Bicarbonate 27      21 - 32 mmol/L Final    Anion Gap 11      10 - 20 mmol/L Final    Urea Nitrogen 54      6 - 23 mg/dL Final    Creatinine 1.84      0.50 - 1.05 mg/dL Final    eGFR 31      >60 mL/min/1.73m*2 Final    Comment:    Calculations of estimated GFR are performed using the 2021 CKD-EPI Study Refit equation without the race variable for the IDMS-Traceable creatinine methods.  https://jasn.asnjournals.org/content/early/2021/09/22/ASN.7764996857    Calcium 8.6      8.6 - 10.3 mg/dL Final                  Magnesium        Component Value Flag Ref Range Units Status    Magnesium 1.76      1.60 - 2.40 mg/dL Final                  POCT GLUCOSE        Component Value Flag Ref Range Units Status    POCT Glucose 79      74 - 99 mg/dL Final                  POCT GLUCOSE        Component Value Flag Ref Range Units Status    POCT Glucose 67      74 - 99 mg/dL Final                  POCT GLUCOSE        Component Value Flag Ref Range Units Status    POCT Glucose 67      74 - 99 mg/dL Final                  POCT GLUCOSE        Component Value Flag Ref Range Units Status    POCT Glucose 164      74 - 99 mg/dL Final                  POCT GLUCOSE        Component Value Flag Ref Range Units Status    POCT Glucose 54      74 - 99 mg/dL Final    Comment:    RN/MD NOTIFIED                  POCT GLUCOSE        Component Value Flag Ref Range Units Status    POCT Glucose 102      74 - 99 mg/dL Final                  XR toe left 2+ views          Interpreted By:  Jose A Anderson,   STUDY:  XR TOE LEFT 2+ VIEWS; ;  10/21/2023 12:28 pm      INDICATION:  Signs/Symptoms:4th digit wound, left  foot.      COMPARISON:  None.      ACCESSION NUMBER(S):  [id number]      ORDERING CLINICIAN:  BURKE DIAL      FINDINGS:  Limited examination due to partially imaged distal 1st and 2nd toes.  Redemonstration extensive foot deformity with hammertoe deformities  of the 3rd through 5th digits. Ghost tracks from prior postoperative  changes within the 2nd-4th metatarsals, and midfoot status post  subtalar and calcaneocuboid fusion. Stable marked deformity of the  ankle and hindfoot as well as increased sclerosis throughout the  ankle joint.. No acute fracture or dislocation. Moderate dorsal soft  tissue swelling. No obvious skin defect.  Dense atherosclerotic  calcifications.      IMPRESSION:  Limited examination due to incomplete visualization of the distal 1st  and 2nd toes. Prior postoperative changes as described above, with  marked deformity and osseous remodeling predominantly involving the  midfoot and ankle. Moderate dorsal soft tissue swelling without  obvious skin defect.          MACRO:  None      Signed by: Jose A Anderson 10/23/2023 8:34 AM  Dictation workstation:   [id number]         FENG without exercise                      [facility] - [place], [place]  [id number] [id number]      Tel [phone] Fax [phone]       Vascular Lab Report           PVR FENG    Patient Name:      LISBET GUTIERRES        Swati Physician:  [id number] Enzo Ashley MD, RPVI  Study Date:        10/21/2023            Ordering Provider:  [id number] BURKE DIAL  MRN/PID:           [id number]              Fellow:  Accession#:        [id number]          Technologist:       Ginna Travis RVT  Date of Birth/Age: 1960 / 62 years Technologist 2:  Gender:            F                     Encounter#:         [id number]  Admission Status:  Inpatient             Location Performed: [place]                                                                [facility] [place]       Diagnosis/ICD: 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       CONCLUSIONS:  Right Lower PVR: No evidence of arterial occlusive disease in the right lower extremity at rest. Normal digital perfusion noted. Triphasic flow is noted in the right common femoral artery, right posterior tibial artery and right dorsalis pedis artery. Ankle and digit tracings, waveforms and segmental pressures appear with in normal limits.  Left Lower PVR: No evidence of arterial occlusive disease in the left lower extremity at rest. Normal digital perfusion noted. Triphasic flow is noted in the left common femoral artery, left posterior tibial artery and left dorsalis pedis artery. Ankle and digit tracings, waveforms and segmental pressures appear with in normal limits.     Imaging & Doppler Findings:     RIGHT Lower PVR                Pressures Ratios  Right Posterior Tibial (Ankle) 141 mmHg  1.13  Right Dorsalis Pedis (Ankle)   138 mmHg  1.10  Right Digit (Great Toe)        133 mmHg  1.06          LEFT Lower PVR                Pressures Ratios  Left Posterior Tibial (Ankle) 136 mmHg  1.09  Left Dorsalis Pedis (Ankle)   142 mmHg  1.14  Left Digit (Great Toe)        171 mmHg  1.37                             Right     Left  Brachial Pressure 125 mmHg 125 mmHg          [id number] Enzo Ashley MD, RPVI  Electronically signed by [id number] Enzo Ashley MD, RPVI on 10/21/2023 at 4:35:21 PM         ** Final **       Linked Images                              POCT GLUCOSE        Component Value Flag Ref Range Units Status    POCT Glucose 144      74 - 99 mg/dL Final                  POCT GLUCOSE        Component Value Flag Ref Range Units Status    POCT Glucose 115      74 - 99 mg/dL Final                   POCT GLUCOSE        Component Value Flag Ref Range Units Status    POCT Glucose 90      74 - 99 mg/dL Final                  POCT GLUCOSE        Component Value Flag Ref Range Units Status    POCT Glucose 36      74 - 99 mg/dL Final    Comment:    RN/MD NOTIFIED                  POCT GLUCOSE        Component Value Flag Ref Range Units Status    POCT Glucose 162      74 - 99 mg/dL Final                  CBC        Component Value Flag Ref Range Units Status    WBC 4.8      4.4 - 11.3 x10*3/uL Final    nRBC 0.4      0.0 - 0.0 /100 WBCs Final    RBC 3.20      4.00 - 5.20 x10*6/uL Final    Hemoglobin 9.3      12.0 - 16.0 g/dL Final    Hematocrit 31.5      36.0 - 46.0 % Final    MCV 98      80 - 100 fL Final    MCH 29.1      26.0 - 34.0 pg Final    MCHC 29.5      32.0 - 36.0 g/dL Final    RDW 18.6      11.5 - 14.5 % Final    Platelets 104      150 - 450 x10*3/uL Final    MPV 11.8      7.5 - 11.5 fL Final                  Basic Metabolic Panel        Component Value Flag Ref Range Units Status    Glucose 78      74 - 99 mg/dL Final    Sodium 142      136 - 145 mmol/L Final    Potassium 4.3      3.5 - 5.3 mmol/L Final    Chloride 110      98 - 107 mmol/L Final    Bicarbonate 27      21 - 32 mmol/L Final    Anion Gap 9      10 - 20 mmol/L Final    Urea Nitrogen 44      6 - 23 mg/dL Final    Creatinine 1.63      0.50 - 1.05 mg/dL Final    eGFR 36      >60 mL/min/1.73m*2 Final    Comment:    Calculations of estimated GFR are performed using the 2021 CKD-EPI Study Refit equation without the race variable for the IDMS-Traceable creatinine methods.  https://jasn.asnjournals.org/content/early/2021/09/22/ASN.4708496304    Calcium 9.1      8.6 - 10.3 mg/dL Final                  POCT GLUCOSE        Component Value Flag Ref Range Units Status    POCT Glucose 122      74 - 99 mg/dL Final                  POCT GLUCOSE        Component Value Flag Ref Range Units Status    POCT Glucose 162      74 - 99 mg/dL Final                   CBC        Component Value Flag Ref Range Units Status    WBC 3.9      4.4 - 11.3 x10*3/uL Final    nRBC 0.0      0.0 - 0.0 /100 WBCs Final    RBC 3.01      4.00 - 5.20 x10*6/uL Final    Hemoglobin 8.9      12.0 - 16.0 g/dL Final    Hematocrit 30.2      36.0 - 46.0 % Final          80 - 100 fL Final    MCH 29.6      26.0 - 34.0 pg Final    MCHC 29.5      32.0 - 36.0 g/dL Final    RDW 18.4      11.5 - 14.5 % Final    Platelets 104      150 - 450 x10*3/uL Final    MPV 13.5      7.5 - 11.5 fL Final                  Magnesium        Component Value Flag Ref Range Units Status    Magnesium 1.73      1.60 - 2.40 mg/dL Final                  POCT GLUCOSE        Component Value Flag Ref Range Units Status    POCT Glucose 125      74 - 99 mg/dL Final                  POCT GLUCOSE        Component Value Flag Ref Range Units Status    POCT Glucose 42      74 - 99 mg/dL Final                  Basic Metabolic Panel        Component Value Flag Ref Range Units Status    Glucose 48      74 - 99 mg/dL Final    Sodium 143      136 - 145 mmol/L Final    Potassium 4.8      3.5 - 5.3 mmol/L Final    Chloride 114      98 - 107 mmol/L Final    Bicarbonate 23      21 - 32 mmol/L Final    Anion Gap 11      10 - 20 mmol/L Final    Urea Nitrogen 46      6 - 23 mg/dL Final    Creatinine 1.70      0.50 - 1.05 mg/dL Final    eGFR 34      >60 mL/min/1.73m*2 Final    Comment:    Calculations of estimated GFR are performed using the 2021 CKD-EPI Study Refit equation without the race variable for the IDMS-Traceable creatinine methods.  https://jasn.asnjournals.org/content/early/2021/09/22/ASN.2495836611    Calcium 8.6      8.6 - 10.3 mg/dL Final                  C-Peptide        Component Value Flag Ref Range Units Status    C-Peptide 3.0      0.7 - 3.9 ng/mL Final                  Beta Hydroxybutyrate        Component Value Flag Ref Range Units Status    Beta-Hydroxybutyrate 0.07      0.02 - 0.27 mmol/L Final                  POCT  GLUCOSE        Component Value Flag Ref Range Units Status    POCT Glucose 62      74 - 99 mg/dL Final                  POCT GLUCOSE        Component Value Flag Ref Range Units Status    POCT Glucose 87      74 - 99 mg/dL Final                        Expand All Collapse All    Consults   Reason For Consult  Left 4th digit wound     History Of Present Illness  Bing Holliday is a 62 y.o. female who presented to [facility] ED and was admitted on 10/20/23 for lumbar stenosis with neurogentic claudication.  Podiatry consulted for left 4th digit wound.  Patient states that the wound started as a callus, turned to a blister, and an open wound.  Patient states that she does not have an established podiatrist at this time.  Patient states that the wound is painful, describes the pain as achy, and rates the pain 4 out of 10.  Patient states that the pain is well controlled with Tylenol.  Patient states that she has just been applying a Band-Aid and antibiotic ointment.  Patient denies any current constitutional symptoms, and has no other complaints at this time.     Past Medical History  She has a past medical history of Acute upper respiratory infection, unspecified (03/04/2020), Acute upper respiratory infection, unspecified (09/30/2015), Arthritis, Body mass index (BMI) 23.0-23.9, adult (10/15/2021), Body mass index (BMI) 33.0-33.9, adult (03/04/2020), Cardiomegaly (08/27/2013), Chronic kidney disease, stage 3 unspecified (CMS/HCC) (07/02/2013), Disease of pericardium, unspecified (07/02/2013), Encounter for follow-up examination after completed treatment for conditions other than malignant neoplasm (10/06/2022), Generalized contraction of visual field, right eye (01/29/2015), Homonymous bilateral field defects, right side (04/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), Hypertensive chronic kidney disease with stage 1 through stage 4 chronic kidney disease, or unspecified chronic kidney disease (07/02/2013), Laceration without foreign body, left foot,  initial encounter (07/03/2018), Migraine with aura, not intractable, without status migrainosus (10/24/2022), Other conditions influencing health status (07/02/2013), Other conditions influencing health status (07/02/2013), Other conditions influencing health status (07/02/2013), Other conditions influencing health status (05/22/2015), Other conditions influencing health status (10/24/2022), Other conditions influencing health status (03/14/2022), Other long term (current) drug therapy (10/24/2022), Personal history of diseases of the blood and blood-forming organs and certain disorders involving the immune mechanism (07/02/2013), Personal history of diseases of the skin and subcutaneous tissue (08/11/2015), Personal history of nephrotic syndrome (07/02/2013), Personal history of other diseases of the circulatory system (08/27/2013), Personal history of other diseases of the nervous system and sense organs, Personal history of other diseases of the respiratory system, Personal history of other infectious and parasitic diseases (07/02/2013), Personal history of other specified conditions, Personal history of other specified conditions (08/27/2013), Puckering of macula, right eye (10/24/2022), Raynaud's syndrome without gangrene (07/02/2013), Systemic lupus erythematosus, unspecified (CMS/[facility]) (07/24/2015), Systemic lupus erythematosus, unspecified (CMS/[facility]) (07/24/2015), Systemic lupus erythematosus, unspecified (CMS/[facility]) (07/24/2015), Type 2 diabetes mellitus with diabetic nephropathy (CMS/[facility]) (07/02/2013), Type 2 diabetes mellitus with mild nonproliferative diabetic retinopathy without macular edema, left eye (CMS/[facility]) (07/27/2015), Type 2 diabetes mellitus with mild nonproliferative diabetic retinopathy without macular edema, unspecified eye (CMS/[facility]) (07/24/2015), Type 2 diabetes mellitus with proliferative diabetic retinopathy without macular edema, right eye (CMS/[facility]) (07/27/2015), Type 2 diabetes mellitus  with proliferative diabetic retinopathy without macular edema, unspecified eye (CMS/[facility]) (07/24/2015), Unspecified acute and subacute iridocyclitis (07/24/2015), and Unspecified open wound, left foot, sequela (07/03/2018).     Surgical History  She has a past surgical history that includes Eye surgery (03/06/2015); Total hip arthroplasty (01/29/2015); Cholecystectomy (01/29/2015); Other surgical history (01/29/2015); Other surgical history (01/29/2015); Ankle surgery (01/29/2015); Foot surgery (01/29/2015); MR angio head wo IV contrast (7/26/2013); MR angio neck wo IV contrast (7/26/2013); CT guided percutaneous biopsy bone deep (5/4/2021); MR angio head wo IV contrast (9/17/2021); MR angio neck wo IV contrast (9/17/2021); MR angio neck wo IV contrast (3/25/2023); and MR angio head wo IV contrast (3/25/2023).     Social History  She reports that she has never smoked. She has never used smokeless tobacco. She reports that she does not drink alcohol and does not use drugs.     Family History  Family History   No family history on file.        Allergies  Ace inhibitors, Hydroxychloroquine, Lisinopril, Penicillins, and Sulfa (sulfonamide antibiotics)     Review of Systems  Review of Systems      Physical Exam  Vascular: DP and PT pulses palpable 1/4 bilaterally.  CFT under 5 seconds when tested at distal digits.  Skin temp warm to warm from proximal to distal.  +2 pitting edema noted to BLE.     Neuro: Protective sensation diminished, light tough sensation intact.  No Tinel's or Valleix's signs elicited.       Derm:  Full thickness non pressure ulcer noted to dorsal 4th digit PIPJ.  No probe to bone or exposed tendon noted at this time.  Wound base is nearly 100% fibrotic with mild serosanguineous drainage no purulence, erythema, proximal streaking, or other SOI noted at this time.  Hyperkeratotic tissue noted to lateral aspect of left plantar foot at fifth metatarsal styloid process, no open lesion upon debridement.   "Hyperkeratotic tissue noted to subsecond metatarsal head right foot, no open lesion upon debridement.  No subcutaneous nodules.  Interdigital spaces are CDI.     Musc: Rigid cavus deformity noted to left foot, with nonreducible hammertoe deformities noted to digits 2 through 5 bilaterally.  No other gross deformities noted.  No POP noted to any other area of the foot/ankle.     Medications  Scheduled medications  acetaminophen, 975 mg, oral, q8h  amLODIPine, 2.5 mg, oral, Daily  apixaban, 2.5 mg, oral, BID  atorvastatin, 40 mg, oral, Nightly  budesonide, 0.5 mg, nebulization, BID  cefTRIAXone, 1 g, intravenous, q24h  fludrocortisone, 0.1 mg, oral, Every other day  folic acid, 1 mg, oral, Daily  insulin lispro, 0-10 Units, subcutaneous, Before meals & nightly  ipratropium-albuteroL, 3 mL, nebulization, TID  lidocaine, 1 patch, transdermal, Daily  melatonin, 5 mg, oral, Nightly  multivitamin with minerals, 1 tablet, oral, Daily  mycophenolate, 500 mg, oral, BID  pantoprazole, 40 mg, oral, Daily before breakfast   Or  pantoprazole, 40 mg, intravenous, Daily before breakfast  predniSONE, 5 mg, oral, Daily  risperiDONE, 0.5 mg, oral, Nightly  [Held by provider] torsemide, 10 mg, oral, Daily        Continuous medications  sodium chloride 0.9%, 100 mL/hr, Last Rate: 100 mL/hr (10/21/23 0907)        PRN medications  PRN medications: acetaminophen **OR** acetaminophen **OR** acetaminophen, acetaminophen **OR** acetaminophen **OR** acetaminophen, dextrose, glucagon, loperamide, melatonin, metoclopramide **OR** metoclopramide, ondansetron ODT **OR** ondansetron, oxyCODONE, polyethylene glycol      Last Recorded Vitals  Blood pressure 126/64, pulse 61, temperature 35.2 °C (95.4 °F), temperature source Temporal, resp. rate 18, height 1.803 m (5' 11\"), weight 94.6 kg (208 lb 8.9 oz), SpO2 94 %.     Relevant Results        Results for orders placed or performed during the hospital encounter of 10/19/23 (from the past 24 hour(s)) "   POCT GLUCOSE   Result Value Ref Range     POCT Glucose 46 (L) 74 - 99 mg/dL   POCT GLUCOSE   Result Value Ref Range     POCT Glucose 130 (H) 74 - 99 mg/dL   POCT GLUCOSE   Result Value Ref Range     POCT Glucose 74 74 - 99 mg/dL   POCT GLUCOSE   Result Value Ref Range     POCT Glucose 79 74 - 99 mg/dL   ECG 12 lead   Result Value Ref Range     Ventricular Rate 57 BPM     Atrial Rate 57 BPM     AR Interval 184 ms     QRS Duration 100 ms     QT Interval 454 ms     QTC Calculation(Bazett) 441 ms     P Axis 56 degrees     R Axis -19 degrees     T Axis 21 degrees     QRS Count 10 beats     Q Onset 217 ms     P Onset 125 ms     P Offset 185 ms     T Offset 444 ms     QTC Fredericia 446 ms   POCT GLUCOSE   Result Value Ref Range     POCT Glucose 67 (L) 74 - 99 mg/dL   POCT GLUCOSE   Result Value Ref Range     POCT Glucose 67 (L) 74 - 99 mg/dL   POCT GLUCOSE   Result Value Ref Range     POCT Glucose 164 (H) 74 - 99 mg/dL   CBC   Result Value Ref Range     WBC 3.9 (L) 4.4 - 11.3 x10*3/uL     nRBC 0.0 0.0 - 0.0 /100 WBCs     RBC 3.02 (L) 4.00 - 5.20 x10*6/uL     Hemoglobin 8.7 (L) 12.0 - 16.0 g/dL     Hematocrit 29.4 (L) 36.0 - 46.0 %     MCV 97 80 - 100 fL     MCH 28.8 26.0 - 34.0 pg     MCHC 29.6 (L) 32.0 - 36.0 g/dL     RDW 18.2 (H) 11.5 - 14.5 %     Platelets 98 (L) 150 - 450 x10*3/uL     MPV 13.2 (H) 7.5 - 11.5 fL   Basic Metabolic Panel   Result Value Ref Range     Glucose 40 (LL) 74 - 99 mg/dL     Sodium 143 136 - 145 mmol/L     Potassium 4.9 3.5 - 5.3 mmol/L     Chloride 110 (H) 98 - 107 mmol/L     Bicarbonate 27 21 - 32 mmol/L     Anion Gap 11 10 - 20 mmol/L     Urea Nitrogen 54 (H) 6 - 23 mg/dL     Creatinine 1.84 (H) 0.50 - 1.05 mg/dL     eGFR 31 (L) >60 mL/min/1.73m*2     Calcium 8.6 8.6 - 10.3 mg/dL   Magnesium   Result Value Ref Range     Magnesium 1.76 1.60 - 2.40 mg/dL   POCT GLUCOSE   Result Value Ref Range     POCT Glucose 54 (L) 74 - 99 mg/dL   POCT GLUCOSE   Result Value Ref Range     POCT Glucose 102  (H) 74 - 99 mg/dL      ECG 12 lead     Result Date: 10/20/2023  Sinus bradycardia Voltage criteria for left ventricular hypertrophy Abnormal ECG When compared with ECG of 30-AUG-2023 09:00, Sinus rhythm has replaced Atrial fibrillation Vent. rate has decreased BY  37 BPM QRS duration has increased Criteria for Septal infarct are no longer Present Criteria for Inferior infarct are no longer Present ST elevation has replaced ST depression in Lateral leads Confirmed by Caterina Dixon ([id number]) on 10/20/2023 11:20:43 PM     MR thoracic spine wo IV contrast     Result Date: 10/20/2023  Interpreted By:  Juan A Betancourt, STUDY: MR THORACIC SPINE WO IV CONTRAST; ;  10/20/2023 3:19 pm   INDICATION: Signs/Symptoms:t7 compression fracture, bilateral lower extremity weakness, intermittent urinary incontinence.   COMPARISON: CT thoracic spine from 10/19/2023. MRI thoracic spine from 05/05/2021.   ACCESSION NUMBER(S): [id number]   ORDERING CLINICIAN: HARJEET SO   TECHNIQUE: MRI of the thoracic spine was performed with acquisition of sagittal T2, sagittal T1, sagittal STIR, axial T1, and axial T2 weighted sequences.   FINDINGS: There is mild S shaped curvature of the thoracic spine with dextrocurvature superiorly and levocurvature inferiorly. There is stable grade 1 anterolisthesis at T3-T4 and mild retrolisthesis at T11-T12. Otherwise, there is no striking spondylolisthesis at any level within the thoracic spine.   There is 40-45% height loss of the T7 vertebral body, unchanged since the MRI from 05/05/2021. There is no retropulsion into the spinal canal. Remaining thoracic vertebral body heights are maintained. There is no edematous signal located within the visualized vertebral bodies.   There is mild intervertebral disc height narrowing at few levels with chronic degenerative endplate changes, similar to the prior MRI.   There are disc bulges/protrusions at multiple levels within the thoracic spine which cause variable degree  of ventral thecal sac effacement but no spinal canal stenosis. Disc bulge/protrusion with osteophytic spurring is most pronounced at the level of T11-T12, unchanged. There is variable degree of neural foraminal narrowing at multiple levels due to degenerative changes including moderate bilateral neural foraminal narrowing at T7-T8 and mild right neural foraminal narrowing at T8-T9. There is stable marked bilateral neural foraminal narrowing at T9-T10 and marked right neural foraminal narrowing at T10-T11. There is stable marked bilateral neural foraminal narrowing at T11-T12.   There is facet osteoarthropathy at multiple levels.   The thoracic spinal cord is normal in signal and caliber.        1. Stable moderate compression fracture of the T7 vertebral body compared to the MRI from 05/05/2021. There is no retropulsion into the spinal canal. Remaining vertebral body heights are maintained.   2. Multilevel degenerative changes of the thoracic spine are unchanged since the prior MRI.   This study was interpreted at [facility].     MACRO: None   Signed by: Juan A Betancourt 10/20/2023 3:31 PM Dictation workstation:   [id number]     MR lumbar spine wo IV contrast     Result Date: 10/19/2023  Interpreted By:  Lo Judge, STUDY: MR LUMBAR SPINE WO IV CONTRAST;  10/19/2023 9:16 pm   INDICATION: Signs/Symptoms:incontinence new onset.   COMPARISON: CT lumbar spine 10/19/2023, MRI 05/05/2021   ACCESSION NUMBER(S): [id number]   ORDERING CLINICIAN: JADA BURRELL   TECHNIQUE: Sagittal T1, T2, STIR, axial T1 and T2 weighted images of the lumbar spine were acquired.   FINDINGS: Alignment: Reversal of the cervical curvature and dextroscoliosis centered about L3, similar to prior imaging. Minimal retrolisthesis of L3 on L4. Minimal retrolisthesis of L4 on L5.   Vertebrae/Intervertebral Discs: Multilevel chronic appearing vertebral body height loss most prominent at L2, L3 and L4 similar to  prior imaging.Mild chronic endplate bone marrow signal changes. No significant bone marrow edema identified. Probable osseous demineralization. Multilevel intervertebral disc space narrowing and disc desiccation without significant intervertebral disc space edema.   Conus: The lower thoracic cord appears unremarkable. The conus terminates at L1.   T12-L1: Disc osteophyte complex and posterior ligamentous hypertrophy with facet arthropathy resulting in mild-to-moderate canal narrowing. Mild foraminal narrowing.   L1-2: Mild disc bulge and facet hypertrophy with moderate canal narrowing and moderate left lateral recess stenosis. Mild right and moderate left foraminal narrowing.   L2-3: Disc osteophyte complex and facet hypertrophy with moderate canal and moderate to severe lateral recess stenosis. Mild to moderate foraminal stenosis.   L3-4: Central to left paracentral disc osteophyte complex with retrolisthesis, ligamentous hypertrophy and facet arthropathy resulting in severe canal stenosis, similar are mildly progressed from prior imaging. Moderate right and severe left foraminal stenosis.   L4-5: Disc bulge and posterior ligamentous hypertrophy with facet arthropathy resulting in at least moderate canal narrowing. Mild to moderate left and severe right foraminal stenosis.   L5-S1: Disc bulge and facet hypertrophy with mild canal narrowing and moderate left lateral recess stenosis. Moderate right and mild left foraminal narrowing.   The prevertebral and posterior paraspinous soft tissues are unremarkable. A probable left renal cyst.        Extensive degenerative changes with reversal of the lumbar lordosis and severe L3-L4 canal stenosis, similar to slightly progressed from 05/05/2021. Additional findings of foraminal and lateral recess narrowing as described.   No evidence of acute fracture or traumatic subluxation.   MACRO: None   Signed by: Lo Judge 10/19/2023 10:00 PM Dictation workstation:    [id number]     CT cervical spine wo IV contrast     Result Date: 10/19/2023  Interpreted By:  Mariia Concepcion, STUDY: CT CERVICAL SPINE WO IV CONTRAST; CT LUMBAR SPINE WO IV CONTRAST; CT THORACIC SPINE WO IV CONTRAST;  10/19/2023 5:07 pm   INDICATION: Signs/Symptoms:per neuro request; Signs/Symptoms:per neuro request for lower extremity weakness.   COMPARISON: None.   ACCESSION NUMBER(S): [id number]; [id number]; [id number]   ORDERING CLINICIAN: JADA BURRELL   TECHNIQUE: Axial CT images of the cervical spine are obtained. Axial, coronal and sagittal reconstructions are provided for review. Axial CT images of the thoracic spine are obtained. Axial, coronal and sagittal reconstructions are provided for review.Axial CT images of the lumbar spine are obtained. Axial, coronal and sagittal reconstructions are provided for review.   FINDINGS: Cervical spine: No acute fracture or subluxation. Reversal of normal cervical lordosis in the midcervical spine. Mild-moderate disc height loss seen at C3-C4, C4-C5, C5-C6, and C6-C7 with multilevel endplate erosions, greatest at C5-C6 and C6-C7. Multilevel marginal osteophytes C3-C7. Multilevel facet arthropathy in the cervical spine. Moderate right-sided neural foraminal narrowing at C3-C4, severe on the left at C4-C5, mild on the left at C5-C6, and moderate bilaterally at C6-C7. No prevertebral hematoma.   Thoracic spine: Moderate compression deformity in T7 vertebral body, age indeterminate. Levocurvature in the lower thoracic spine. Scattered facet arthropathy. Multilevel vacuum phenomenon T9-T10, T10-T11, and T11-T12. Subcentimeter bone island in T1 vertebral body. There is dense material in the urinary system bilaterally suggestive of excreted contrast. Heavy coronary artery calcifications. Streaky opacities in the lungs dependently.   Lumbar spine: Multilevel advanced disc height loss L2-L3, L3-L4, and L4-L5 with endplate erosions and marginal osteophyte formation.  Levoscoliosis of the lumbar spine. Facet arthropathy in the lumbar spine, greatest inferiorly. Multilevel spinal canal stenosis in the lower lumbar spine, at L2-L3 measuring 6 mm, at L3-L4 measuring 5 mm, and at L4-L5 measuring 4 mm. Bilateral neural foraminal narrowing is also seen at these levels. Partially visualized right hip arthroplasty with associated streak artifact. Sigmoid colonic diverticulosis. Atherosclerosis.        No acute osseous abnormality in the cervical spine. Multilevel degenerative change and neural foraminal narrowing.   Moderate age-indeterminate compression fracture T7 vertebral body.   No acute osseous abnormality in the lumbar spine. Multilevel severe canal stenosis as well as neural foraminal narrowing.   Signed by: Mariia Concepcion 10/19/2023 6:06 PM Dictation workstation:   [id number]     CT thoracic spine wo IV contrast     Result Date: 10/19/2023  Interpreted By:  Mariia Concepcion, STUDY: CT CERVICAL SPINE WO IV CONTRAST; CT LUMBAR SPINE WO IV CONTRAST; CT THORACIC SPINE WO IV CONTRAST;  10/19/2023 5:07 pm   INDICATION: Signs/Symptoms:per neuro request; Signs/Symptoms:per neuro request for lower extremity weakness.   COMPARISON: None.   ACCESSION NUMBER(S): [id number]; [id number]; [id number]   ORDERING CLINICIAN: JADA BURRELL   TECHNIQUE: Axial CT images of the cervical spine are obtained. Axial, coronal and sagittal reconstructions are provided for review. Axial CT images of the thoracic spine are obtained. Axial, coronal and sagittal reconstructions are provided for review.Axial CT images of the lumbar spine are obtained. Axial, coronal and sagittal reconstructions are provided for review.   FINDINGS: Cervical spine: No acute fracture or subluxation. Reversal of normal cervical lordosis in the midcervical spine. Mild-moderate disc height loss seen at C3-C4, C4-C5, C5-C6, and C6-C7 with multilevel endplate erosions, greatest at C5-C6 and C6-C7. Multilevel marginal  osteophytes C3-C7. Multilevel facet arthropathy in the cervical spine. Moderate right-sided neural foraminal narrowing at C3-C4, severe on the left at C4-C5, mild on the left at C5-C6, and moderate bilaterally at C6-C7. No prevertebral hematoma.   Thoracic spine: Moderate compression deformity in T7 vertebral body, age indeterminate. Levocurvature in the lower thoracic spine. Scattered facet arthropathy. Multilevel vacuum phenomenon T9-T10, T10-T11, and T11-T12. Subcentimeter bone island in T1 vertebral body. There is dense material in the urinary system bilaterally suggestive of excreted contrast. Heavy coronary artery calcifications. Streaky opacities in the lungs dependently.   Lumbar spine: Multilevel advanced disc height loss L2-L3, L3-L4, and L4-L5 with endplate erosions and marginal osteophyte formation. Levoscoliosis of the lumbar spine. Facet arthropathy in the lumbar spine, greatest inferiorly. Multilevel spinal canal stenosis in the lower lumbar spine, at L2-L3 measuring 6 mm, at L3-L4 measuring 5 mm, and at L4-L5 measuring 4 mm. Bilateral neural foraminal narrowing is also seen at these levels. Partially visualized right hip arthroplasty with associated streak artifact. Sigmoid colonic diverticulosis. Atherosclerosis.        No acute osseous abnormality in the cervical spine. Multilevel degenerative change and neural foraminal narrowing.   Moderate age-indeterminate compression fracture T7 vertebral body.   No acute osseous abnormality in the lumbar spine. Multilevel severe canal stenosis as well as neural foraminal narrowing.   Signed by: Mariia Concepcion 10/19/2023 6:06 PM Dictation workstation:   [id number]     CT lumbar spine wo IV contrast     Result Date: 10/19/2023  Interpreted By:  Mariia Concepcion, STUDY: CT CERVICAL SPINE WO IV CONTRAST; CT LUMBAR SPINE WO IV CONTRAST; CT THORACIC SPINE WO IV CONTRAST;  10/19/2023 5:07 pm   INDICATION: Signs/Symptoms:per neuro request; Signs/Symptoms:per  neuro request for lower extremity weakness.   COMPARISON: None.   ACCESSION NUMBER(S): [id number]; [id number]; [id number]   ORDERING CLINICIAN: JADA BURRELL   TECHNIQUE: Axial CT images of the cervical spine are obtained. Axial, coronal and sagittal reconstructions are provided for review. Axial CT images of the thoracic spine are obtained. Axial, coronal and sagittal reconstructions are provided for review.Axial CT images of the lumbar spine are obtained. Axial, coronal and sagittal reconstructions are provided for review.   FINDINGS: Cervical spine: No acute fracture or subluxation. Reversal of normal cervical lordosis in the midcervical spine. Mild-moderate disc height loss seen at C3-C4, C4-C5, C5-C6, and C6-C7 with multilevel endplate erosions, greatest at C5-C6 and C6-C7. Multilevel marginal osteophytes C3-C7. Multilevel facet arthropathy in the cervical spine. Moderate right-sided neural foraminal narrowing at C3-C4, severe on the left at C4-C5, mild on the left at C5-C6, and moderate bilaterally at C6-C7. No prevertebral hematoma.   Thoracic spine: Moderate compression deformity in T7 vertebral body, age indeterminate. Levocurvature in the lower thoracic spine. Scattered facet arthropathy. Multilevel vacuum phenomenon T9-T10, T10-T11, and T11-T12. Subcentimeter bone island in T1 vertebral body. There is dense material in the urinary system bilaterally suggestive of excreted contrast. Heavy coronary artery calcifications. Streaky opacities in the lungs dependently.   Lumbar spine: Multilevel advanced disc height loss L2-L3, L3-L4, and L4-L5 with endplate erosions and marginal osteophyte formation. Levoscoliosis of the lumbar spine. Facet arthropathy in the lumbar spine, greatest inferiorly. Multilevel spinal canal stenosis in the lower lumbar spine, at L2-L3 measuring 6 mm, at L3-L4 measuring 5 mm, and at L4-L5 measuring 4 mm. Bilateral neural foraminal narrowing is also seen at these levels.  Partially visualized right hip arthroplasty with associated streak artifact. Sigmoid colonic diverticulosis. Atherosclerosis.        No acute osseous abnormality in the cervical spine. Multilevel degenerative change and neural foraminal narrowing.   Moderate age-indeterminate compression fracture T7 vertebral body.   No acute osseous abnormality in the lumbar spine. Multilevel severe canal stenosis as well as neural foraminal narrowing.   Signed by: Mariia Concepcion 10/19/2023 6:06 PM Dictation workstation:   [id number]     XR chest 1 view     Result Date: 10/19/2023  Interpreted By:  Nic Moseley, STUDY: XR CHEST 1 VIEW  10/19/2023 3:11 pm   INDICATION: Signs/Symptoms:bat weakness   COMPARISON: 08/27/2023   ACCESSION NUMBER(S): [id number]   ORDERING CLINICIAN: JADA BURRELL   TECHNIQUE: A single AP portable radiograph of the chest was obtained.   FINDINGS: Multiple cardiac monitoring leads are seen over the chest.   No focal infiltrate, pleural effusion or pneumothorax is identified. The cardiac silhouette is prominent, similar to prior studies.        No focal infiltrate or pneumothorax is identified.   MACRO: None.   Signed by: Nic Moseley 10/19/2023 3:13 PM Dictation workstation:   [id number]     CT angio brain attack head w and wo IV contrast and post procedure     Result Date: 10/19/2023  Interpreted By:  Jairo Moreno, STUDY: CT ANGIO BRAIN ATTACK HEAD W AND WO IV CONTRAST AND POST PROCEDURE; CT ANGIO BRAIN ATTACK NECK W IV CONTRAST AND POST PROCEDURE; 10/19/2023 2:57 pm   INDICATION: Signs/Symptoms:hemorrhagic stroke alert; Signs/Symptoms:bat.   COMPARISON: Head CT, same day   ACCESSION NUMBER(S): [id number]; [id number]   ORDERING CLINICIAN: JADA BURRELL   TECHNIQUE: 50 mL Omnipaque 350 was administered intravenously and axial images of the head and neck were acquired. Coronal and sagittal MIPs and 3-D reconstructions were created on an independent workstation and provided for review.  Image quality is significantly degraded by poor bolus timing.   FINDINGS: CTA Head:   There is minimal arterial phase enhancement, no proximal large vessel occlusion in the [id number].   CTA Neck:   There is minimal arterial phase enhancement, no high-grade narrowing or occlusion of the major cervical arteries.   The soft tissues of the neck are unremarkable. The visualized lungs are clear. Dental caries with associated periapical lucency in the left maxilla. Degenerative changes in the spine and partially visualized glenohumeral joints.        Limited exam due to poor bolus timing, no large vessel occlusion in the head or neck.   MACRO: None   Signed by: Jairo Moreno 10/19/2023 3:08 PM Dictation workstation:   [id number]     CT angio brain attack neck w IV contrast and post procedure     Result Date: 10/19/2023  Interpreted By:  Jairo Moreno, STUDY: CT ANGIO BRAIN ATTACK HEAD W AND WO IV CONTRAST AND POST PROCEDURE; CT ANGIO BRAIN ATTACK NECK W IV CONTRAST AND POST PROCEDURE; 10/19/2023 2:57 pm   INDICATION: Signs/Symptoms:hemorrhagic stroke alert; Signs/Symptoms:bat.   COMPARISON: Head CT, same day   ACCESSION NUMBER(S): [id number]; [id number]   ORDERING CLINICIAN: JADA BURRELL   TECHNIQUE: 50 mL Omnipaque 350 was administered intravenously and axial images of the head and neck were acquired. Coronal and sagittal MIPs and 3-D reconstructions were created on an independent workstation and provided for review. Image quality is significantly degraded by poor bolus timing.   FINDINGS: CTA Head:   There is minimal arterial phase enhancement, no proximal large vessel occlusion in the [id number].   CTA Neck:   There is minimal arterial phase enhancement, no high-grade narrowing or occlusion of the major cervical arteries.   The soft tissues of the neck are unremarkable. The visualized lungs are clear. Dental caries with associated periapical lucency in the left maxilla. Degenerative changes in  the spine and partially visualized glenohumeral joints.        Limited exam due to poor bolus timing, no large vessel occlusion in the head or neck.   MACRO: None   Signed by: Jairo Moreno 10/19/2023 3:08 PM Dictation workstation:   [id number]     CT brain attack head wo IV contrast     Result Date: 10/19/2023  Interpreted By:  Gaston Beyer, STUDY: CT BRAIN ATTACK HEAD WO IV CONTRAST;  10/19/2023 2:38 pm   INDICATION: Signs/Symptoms:Stroke Evaluation.   COMPARISON: 08/28/2023   ACCESSION NUMBER(S): [id number]   ORDERING CLINICIAN: JADA BURRELL   TECHNIQUE: Sequential trans axial images were obtained  .   FINDINGS: INTRACRANIAL:   CORTICAL SULCI AND EXTRA-AXIAL SPACES:  Unremarkable.   VENTRICULAR SYSTEM:  Unremarkable without significant dilatation.   CEREBRAL PARENCHYMA:  There is mild hypodensity at the long tracks of the white matter, particularly in the frontoparietal regions greater on the left similar to the prior exam most likely due to gliosis from arteriolar disease.There is also a small area of encephalomalacia/gliosis at the left occipital lobe most likely from a remote infarction, also similar to the prior exam. Otherwisethere is no evidence of definite subacute infarction, intracranial hemorrhage or mass.   EXTRACRANIAL: Visualized paranasal sinuses and mastoids are clear. The calvarium is intact.        Mild age related degenerative change as described without acute findings or significant change from the prior exam. No intracranial hemorrhage.   MACRO: Gaston Beyer discussed the significance and urgency of this critical finding by secure chat with  JADA BURRELL on 10/19/2023 at 2:42 pm.  (**-RCF-**) Findings:  See findings.     Signed by: Gaston Beyer 10/19/2023 2:44 PM Dictation workstation:   [id number]           Assessment/Plan   Assessment:  - Full thickness non pressure ulcer 4th digit, left foot  - DM-II complicated by peripheral neuropathy  - PVD  - Hyperkeratoses, left and right  foot  - Pain, left and right foot        Plan:  - Pt examined and evaluated.  All findings discussed to patient to their satisfaction and understanding.  - Reviewed labs and chart.  - Systemic conditions managed by primary team, antibiotics managed by ID.  - Removed all hyperkeratotic tissue from left and right foot with #15 scalpel blade without incident.  No open lesions noted to callused area.  -No SOI noted to left fourth digit wound at this time.  Wound does not probe to bone or have exposed tendon.  - Performed dressing change consisting of Betadine paint, Debora, and a border foam to left 4th digit.  Applied border foam to plantar lateral left foot to pad and protect.  Dressings to be changed every other day by podiatry.  - Pt to follow up in office upon discharge.  Will continue to follow inpatient.  Please contact podiatry with any acute questions/concerns.     - Thank you for the consult.      Anibal Silveira DPM  Podiatric Surgery  Doc Halo/Clemente BUNDY spent over 30 minutes in the professional and overall care of this patient.        Anibal Silveira DPM

## 2023-10-23 NOTE — CARE PLAN
The patient's goals for the shift include no injury related to falls able to get some rest/sleep by the end of this shif    The clinical goals for the shift include Pt will be hemodynamically stable throughout this shift      Problem: Fall/Injury  Goal: Verbalize understanding of personal risk factors for fall in the hospital  Outcome: Progressing  Goal: Verbalize understanding of risk factor reduction measures to prevent injury from fall in the home  Outcome: Progressing  Goal: Use assistive devices by end of the shift  Outcome: Progressing  Goal: Pace activities to prevent fatigue by end of the shift  Outcome: Progressing     Problem: Pain  Goal: Takes deep breaths with improved pain control throughout the shift  Outcome: Progressing  Goal: Turns in bed with improved pain control throughout the shift  Outcome: Progressing  Goal: Walks with improved pain control throughout the shift  Outcome: Progressing  Goal: Performs ADL's with improved pain control throughout shift  Outcome: Progressing  Goal: Participates in PT with improved pain control throughout the shift  Outcome: Progressing  Goal: Free from opioid side effects throughout the shift  Outcome: Progressing  Goal: Free from acute confusion related to pain meds throughout the shift  Outcome: Progressing     Problem: Skin  Goal: Decreased wound size/increased tissue granulation at next dressing change  Outcome: Progressing  Goal: Participates in plan/prevention/treatment measures  Outcome: Progressing  Goal: Prevent/manage excess moisture  Outcome: Progressing  Goal: Prevent/minimize sheer/friction injuries  Outcome: Progressing  Goal: Promote/optimize nutrition  Outcome: Progressing  Goal: Promote skin healing  Outcome: Progressing

## 2023-10-23 NOTE — PROGRESS NOTES
10/23/23 1509   Discharge Planning   Home or Post Acute Services Post acute facilities (Rehab/SNF/etc)   Patient Choice   Provider Choice list and CMS website (https://medicare.gov/care-compare#search) for post-acute Quality and Resource Measure Data were provided and reviewed with: Patient   Patient / Family choosing to utilize agency / facility established prior to hospitalization Yes  (wants [facility]. sent referral)     Spoke with the pt regarding d/c plans. She would like to go to [facility] as she has been there in the past. Asked the DSC to send the referral.     [facility]. Advised the pt that she has 10 remaining full coverage days. After the 10 days, she will have a copay of $195. Asked the DSC to start precert.

## 2023-10-24 PROBLEM — R41.82 AMS (ALTERED MENTAL STATUS): Status: RESOLVED | Noted: 2023-10-19 | Resolved: 2023-10-24

## 2023-10-24 LAB
ANION GAP SERPL CALC-SCNC: 11 MMOL/L (ref 10–20)
BUN SERPL-MCNC: 42 MG/DL (ref 6–23)
CALCIUM SERPL-MCNC: 8 MG/DL (ref 8.6–10.3)
CHLORIDE SERPL-SCNC: 115 MMOL/L (ref 98–107)
CO2 SERPL-SCNC: 21 MMOL/L (ref 21–32)
CREAT SERPL-MCNC: 1.48 MG/DL (ref 0.5–1.05)
ERYTHROCYTE [DISTWIDTH] IN BLOOD BY AUTOMATED COUNT: 18.4 % (ref 11.5–14.5)
GFR SERPL CREATININE-BSD FRML MDRD: 40 ML/MIN/1.73M*2
GLUCOSE BLD MANUAL STRIP-MCNC: 108 MG/DL (ref 74–99)
GLUCOSE BLD MANUAL STRIP-MCNC: 167 MG/DL (ref 74–99)
GLUCOSE BLD MANUAL STRIP-MCNC: 180 MG/DL (ref 74–99)
GLUCOSE BLD MANUAL STRIP-MCNC: 50 MG/DL (ref 74–99)
GLUCOSE BLD MANUAL STRIP-MCNC: 88 MG/DL (ref 74–99)
GLUCOSE SERPL-MCNC: 82 MG/DL (ref 74–99)
HCT VFR BLD AUTO: 28.8 % (ref 36–46)
HGB BLD-MCNC: 8.4 G/DL (ref 12–16)
MAGNESIUM SERPL-MCNC: 1.71 MG/DL (ref 1.6–2.4)
MCH RBC QN AUTO: 29.3 PG (ref 26–34)
MCHC RBC AUTO-ENTMCNC: 29.2 G/DL (ref 32–36)
MCV RBC AUTO: 100 FL (ref 80–100)
NRBC BLD-RTO: 0.5 /100 WBCS (ref 0–0)
PLATELET # BLD AUTO: 91 X10*3/UL (ref 150–450)
PMV BLD AUTO: 12 FL (ref 7.5–11.5)
POTASSIUM SERPL-SCNC: 4.9 MMOL/L (ref 3.5–5.3)
RBC # BLD AUTO: 2.87 X10*6/UL (ref 4–5.2)
SODIUM SERPL-SCNC: 142 MMOL/L (ref 136–145)
WBC # BLD AUTO: 4 X10*3/UL (ref 4.4–11.3)

## 2023-10-24 PROCEDURE — 1100000001 HC PRIVATE ROOM DAILY

## 2023-10-24 PROCEDURE — 97535 SELF CARE MNGMENT TRAINING: CPT | Mod: GO,CO

## 2023-10-24 PROCEDURE — 2500000005 HC RX 250 GENERAL PHARMACY W/O HCPCS: Performed by: INTERNAL MEDICINE

## 2023-10-24 PROCEDURE — 94640 AIRWAY INHALATION TREATMENT: CPT

## 2023-10-24 PROCEDURE — 2500000002 HC RX 250 W HCPCS SELF ADMINISTERED DRUGS (ALT 637 FOR MEDICARE OP, ALT 636 FOR OP/ED): Performed by: NURSE PRACTITIONER

## 2023-10-24 PROCEDURE — 36415 COLL VENOUS BLD VENIPUNCTURE: CPT | Mod: 59 | Performed by: STUDENT IN AN ORGANIZED HEALTH CARE EDUCATION/TRAINING PROGRAM

## 2023-10-24 PROCEDURE — 2500000004 HC RX 250 GENERAL PHARMACY W/ HCPCS (ALT 636 FOR OP/ED): Performed by: INTERNAL MEDICINE

## 2023-10-24 PROCEDURE — 82947 ASSAY GLUCOSE BLOOD QUANT: CPT

## 2023-10-24 PROCEDURE — 2500000002 HC RX 250 W HCPCS SELF ADMINISTERED DRUGS (ALT 637 FOR MEDICARE OP, ALT 636 FOR OP/ED): Performed by: STUDENT IN AN ORGANIZED HEALTH CARE EDUCATION/TRAINING PROGRAM

## 2023-10-24 PROCEDURE — 83735 ASSAY OF MAGNESIUM: CPT | Performed by: STUDENT IN AN ORGANIZED HEALTH CARE EDUCATION/TRAINING PROGRAM

## 2023-10-24 PROCEDURE — 97110 THERAPEUTIC EXERCISES: CPT | Mod: GP,CQ

## 2023-10-24 PROCEDURE — 80048 BASIC METABOLIC PNL TOTAL CA: CPT | Performed by: STUDENT IN AN ORGANIZED HEALTH CARE EDUCATION/TRAINING PROGRAM

## 2023-10-24 PROCEDURE — 36415 COLL VENOUS BLD VENIPUNCTURE: CPT | Performed by: STUDENT IN AN ORGANIZED HEALTH CARE EDUCATION/TRAINING PROGRAM

## 2023-10-24 PROCEDURE — 2500000004 HC RX 250 GENERAL PHARMACY W/ HCPCS (ALT 636 FOR OP/ED): Performed by: NURSE PRACTITIONER

## 2023-10-24 PROCEDURE — 97530 THERAPEUTIC ACTIVITIES: CPT | Mod: GO,CO

## 2023-10-24 PROCEDURE — 2500000001 HC RX 250 WO HCPCS SELF ADMINISTERED DRUGS (ALT 637 FOR MEDICARE OP): Performed by: NURSE PRACTITIONER

## 2023-10-24 PROCEDURE — G0378 HOSPITAL OBSERVATION PER HR: HCPCS

## 2023-10-24 PROCEDURE — 85027 COMPLETE CBC AUTOMATED: CPT | Performed by: STUDENT IN AN ORGANIZED HEALTH CARE EDUCATION/TRAINING PROGRAM

## 2023-10-24 PROCEDURE — 97116 GAIT TRAINING THERAPY: CPT | Mod: GP,CQ

## 2023-10-24 PROCEDURE — 99232 SBSQ HOSP IP/OBS MODERATE 35: CPT | Performed by: NURSE PRACTITIONER

## 2023-10-24 RX ADMIN — IPRATROPIUM BROMIDE AND ALBUTEROL SULFATE 3 ML: 2.5; .5 SOLUTION RESPIRATORY (INHALATION) at 14:24

## 2023-10-24 RX ADMIN — APIXABAN 2.5 MG: 2.5 TABLET, FILM COATED ORAL at 20:43

## 2023-10-24 RX ADMIN — BUDESONIDE 0.5 MG: 0.5 INHALANT RESPIRATORY (INHALATION) at 08:19

## 2023-10-24 RX ADMIN — IPRATROPIUM BROMIDE AND ALBUTEROL SULFATE 3 ML: 2.5; .5 SOLUTION RESPIRATORY (INHALATION) at 20:35

## 2023-10-24 RX ADMIN — Medication 5 MG: at 20:43

## 2023-10-24 RX ADMIN — FOLIC ACID 1 MG: 1 TABLET ORAL at 08:59

## 2023-10-24 RX ADMIN — ATORVASTATIN CALCIUM 40 MG: 40 TABLET, FILM COATED ORAL at 20:53

## 2023-10-24 RX ADMIN — MYCOPHENOLATE MOFETIL 500 MG: 250 CAPSULE ORAL at 20:43

## 2023-10-24 RX ADMIN — SODIUM CHLORIDE 100 ML/HR: 9 INJECTION, SOLUTION INTRAVENOUS at 06:32

## 2023-10-24 RX ADMIN — ACETAMINOPHEN 975 MG: 325 TABLET ORAL at 10:42

## 2023-10-24 RX ADMIN — CEFTRIAXONE SODIUM 1 G: 1 INJECTION, SOLUTION INTRAVENOUS at 20:43

## 2023-10-24 RX ADMIN — PANTOPRAZOLE SODIUM 40 MG: 40 TABLET, DELAYED RELEASE ORAL at 06:22

## 2023-10-24 RX ADMIN — MULTIPLE VITAMINS W/ MINERALS TAB 1 TABLET: TAB at 08:59

## 2023-10-24 RX ADMIN — RISPERIDONE 0.5 MG: 0.5 TABLET ORAL at 20:53

## 2023-10-24 RX ADMIN — IPRATROPIUM BROMIDE AND ALBUTEROL SULFATE 3 ML: 2.5; .5 SOLUTION RESPIRATORY (INHALATION) at 08:19

## 2023-10-24 RX ADMIN — DEXTROSE MONOHYDRATE 25 G: 25 INJECTION, SOLUTION INTRAVENOUS at 08:59

## 2023-10-24 RX ADMIN — APIXABAN 2.5 MG: 2.5 TABLET, FILM COATED ORAL at 08:59

## 2023-10-24 RX ADMIN — ACETAMINOPHEN 975 MG: 325 TABLET ORAL at 18:44

## 2023-10-24 RX ADMIN — MYCOPHENOLATE MOFETIL 500 MG: 250 CAPSULE ORAL at 08:59

## 2023-10-24 RX ADMIN — BUDESONIDE 0.5 MG: 0.5 INHALANT RESPIRATORY (INHALATION) at 20:35

## 2023-10-24 RX ADMIN — AMLODIPINE BESYLATE 2.5 MG: 2.5 TABLET ORAL at 08:58

## 2023-10-24 RX ADMIN — ACETAMINOPHEN 975 MG: 325 TABLET ORAL at 06:23

## 2023-10-24 RX ADMIN — PREDNISONE 5 MG: 5 TABLET ORAL at 08:58

## 2023-10-24 ASSESSMENT — PAIN - FUNCTIONAL ASSESSMENT: PAIN_FUNCTIONAL_ASSESSMENT: 0-10

## 2023-10-24 ASSESSMENT — COGNITIVE AND FUNCTIONAL STATUS - GENERAL
HELP NEEDED FOR BATHING: A LOT
MOVING FROM LYING ON BACK TO SITTING ON SIDE OF FLAT BED WITH BEDRAILS: A LITTLE
STANDING UP FROM CHAIR USING ARMS: A LITTLE
PERSONAL GROOMING: A LITTLE
TURNING FROM BACK TO SIDE WHILE IN FLAT BAD: A LITTLE
WALKING IN HOSPITAL ROOM: A LITTLE
WALKING IN HOSPITAL ROOM: A LITTLE
DAILY ACTIVITIY SCORE: 17
STANDING UP FROM CHAIR USING ARMS: A LITTLE
PERSONAL GROOMING: A LITTLE
TOILETING: A LITTLE
MOBILITY SCORE: 16
DRESSING REGULAR LOWER BODY CLOTHING: A LOT
DRESSING REGULAR UPPER BODY CLOTHING: A LITTLE
EATING MEALS: A LITTLE
DRESSING REGULAR UPPER BODY CLOTHING: A LITTLE
DRESSING REGULAR LOWER BODY CLOTHING: A LITTLE
DAILY ACTIVITIY SCORE: 18
MOBILITY SCORE: 18
CLIMB 3 TO 5 STEPS WITH RAILING: TOTAL
TURNING FROM BACK TO SIDE WHILE IN FLAT BAD: A LITTLE
CLIMB 3 TO 5 STEPS WITH RAILING: A LITTLE
MOVING TO AND FROM BED TO CHAIR: A LITTLE
MOVING TO AND FROM BED TO CHAIR: A LITTLE
HELP NEEDED FOR BATHING: A LITTLE
MOVING FROM LYING ON BACK TO SITTING ON SIDE OF FLAT BED WITH BEDRAILS: A LITTLE
TOILETING: A LITTLE

## 2023-10-24 ASSESSMENT — ACTIVITIES OF DAILY LIVING (ADL): HOME_MANAGEMENT_TIME_ENTRY: 12

## 2023-10-24 ASSESSMENT — PAIN SCALES - GENERAL
PAINLEVEL_OUTOF10: 0 - NO PAIN
PAINLEVEL_OUTOF10: 0 - NO PAIN

## 2023-10-24 NOTE — PROGRESS NOTES
"Bing Holliday is a 62 y.o. female on day 4 of admission presenting with Disorientation.    Subjective   Patient seen and examined  No overnight events.    Patient resting in bed, NAD  Has no complaints this am  In DC status awaiting SNF    Objective     Physical Exam    Constitutional: Well developed, awake/alert/oriented x3, no distress, alert and cooperative  HEENT: anicteric sclera, eomi, no oral lesions noted, moist orophraynx, no thyromegaly  Cardiovascular: Regular, rate and rhythm, no murmurs, 2+ equal pulses of the extremities, normal S1 and S2  Respiratory/Thorax: Patent airways, CTAB, normal breath sounds with good chest expansion, thorax symmetric, no conversational dyspnea  Gastrointestinal: +BS, Nondistended, soft, non-tender, no rebound tenderness or guarding, no masses palpable, no organomegaly  Musculoskeletal: +chronic b/l hand contractures, limited ROM b/l LE, decreased strength b/l  Extremities: no edema  Skin: warm and dry. No rashes nor lesions noted  Neurological: alert and oriented x3, intact senses, motor, follows commands, clear speech, no facial droop    Last Recorded Vitals  Blood pressure 132/79, pulse 54, temperature 35.1 °C (95.2 °F), temperature source Temporal, resp. rate 18, height 1.803 m (5' 11\"), weight 94.6 kg (208 lb 8.9 oz), SpO2 93 %.  Intake/Output last 3 Shifts:  I/O last 3 completed shifts:  In: 3039.7 (32.1 mL/kg) [P.O.:840; I.V.:2149.7 (22.7 mL/kg); IV Piggyback:50]  Out: 1400 (14.8 mL/kg) [Urine:1400 (0.4 mL/kg/hr)]  Weight: 94.6 kg     Relevant Results  Scheduled medications  acetaminophen, 975 mg, oral, q8h  amLODIPine, 2.5 mg, oral, Daily  apixaban, 2.5 mg, oral, BID  atorvastatin, 40 mg, oral, Nightly  budesonide, 0.5 mg, nebulization, BID  cefTRIAXone, 1 g, intravenous, q24h  fludrocortisone, 0.1 mg, oral, Every other day  folic acid, 1 mg, oral, Daily  insulin lispro, 0-10 Units, subcutaneous, Before meals & nightly  ipratropium-albuteroL, 3 mL, nebulization, " TID  lidocaine, 1 patch, transdermal, Daily  melatonin, 5 mg, oral, Nightly  multivitamin with minerals, 1 tablet, oral, Daily  mycophenolate, 500 mg, oral, BID  pantoprazole, 40 mg, oral, Daily before breakfast   Or  pantoprazole, 40 mg, intravenous, Daily before breakfast  predniSONE, 5 mg, oral, Daily  risperiDONE, 0.5 mg, oral, Nightly  [Held by provider] torsemide, 10 mg, oral, Daily      Continuous medications  sodium chloride 0.9%, 100 mL/hr, Last Rate: 100 mL/hr (10/24/23 0957)      PRN medications  PRN medications: dextrose, glucagon, loperamide, melatonin, metoclopramide **OR** metoclopramide, ondansetron ODT **OR** ondansetron, oxyCODONE, polyethylene glycol  Results from last 7 days   Lab Units 10/24/23  0608 10/23/23  0803 10/22/23  1320   WBC AUTO x10*3/uL 4.0* 3.9* 4.8   RBC AUTO x10*6/uL 2.87* 3.01* 3.20*   HEMOGLOBIN g/dL 8.4* 8.9* 9.3*       Results from last 7 days   Lab Units 10/24/23  0608 10/23/23  0803 10/22/23  1320 10/21/23  0637 10/20/23  0600 10/19/23  1455   SODIUM mmol/L 142 143 142 143 143 141   POTASSIUM mmol/L 4.9 4.8 4.3 4.9 5.1 5.4*   CHLORIDE mmol/L 115* 114* 110* 110* 113* 113*   CO2 mmol/L 21 23 27 27 23 23   BUN mg/dL 42* 46* 44* 54* 56* 55*   CREATININE mg/dL 1.48* 1.70* 1.63* 1.84* 1.52* 1.47*   CALCIUM mg/dL 8.0* 8.6 9.1 8.6 8.6 8.9   MAGNESIUM mg/dL 1.71 1.73  --  1.76  --  1.88   BILIRUBIN TOTAL mg/dL  --   --   --   --  0.2 0.2   ALT U/L  --   --   --   --  26 28   AST U/L  --   --   --   --  32 37         FENG without exercise    Result Date: 10/21/2023            [facility] [address] [place] [address]     Tel [phone] Fax [phone]  Vascular Lab Report        PVR FENG Patient Name:      LISBET NENITA Longoria Physician:  [id number] Enzo Ashley MD, RPVI Study Date:        10/21/2023            Ordering Provider:  [id number] BURKE TORRES                                                               YOJANA MRN/PID:           [id number]              Fellow: Accession#:        [id number]          Technologist:       Ginna Travis RVHEENA Date of Birth/Age: 1960 / 62 years Technologist 2: Gender:            F                     Encounter#:         [id number] Admission Status:  Inpatient             Location Performed: [facility]  Diagnosis/ICD: 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  CONCLUSIONS: Right Lower PVR: No evidence of arterial occlusive disease in the right lower extremity at rest. Normal digital perfusion noted. Triphasic flow is noted in the right common femoral artery, right posterior tibial artery and right dorsalis pedis artery. Ankle and digit tracings, waveforms and segmental pressures appear with in normal limits. Left Lower PVR: No evidence of arterial occlusive disease in the left lower extremity at rest. Normal digital perfusion noted. Triphasic flow is noted in the left common femoral artery, left posterior tibial artery and left dorsalis pedis artery. Ankle and digit tracings, waveforms and segmental pressures appear with in normal limits.  Imaging & Doppler Findings:  RIGHT Lower PVR                Pressures Ratios Right Posterior Tibial (Ankle) 141 mmHg  1.13 Right Dorsalis Pedis (Ankle)   138 mmHg  1.10 Right Digit (Great Toe)        133 mmHg  1.06   LEFT Lower PVR                Pressures Ratios Left Posterior Tibial (Ankle) 136 mmHg  1.09 Left Dorsalis Pedis (Ankle)   142 mmHg  1.14 Left Digit (Great Toe)        171 mmHg  1.37                      Right     Left Brachial Pressure 125 mmHg 125 mmHg   [id number] ROLANDO Krishna MD Electronically signed by [id number] ROLANDO Krishna MD on 10/21/2023 at 4:35:21 PM  ** Final **     ECG 12  lead    Result Date: 10/20/2023  Sinus bradycardia Voltage criteria for left ventricular hypertrophy Abnormal ECG When compared with ECG of 30-AUG-2023 09:00, Sinus rhythm has replaced Atrial fibrillation Vent. rate has decreased BY  37 BPM QRS duration has increased Criteria for Septal infarct are no longer Present Criteria for Inferior infarct are no longer Present ST elevation has replaced ST depression in Lateral leads Confirmed by Caterina Dixon ([id number]) on 10/20/2023 11:20:43 PM       Assessment/Plan   Active Problems:    Ambulatory dysfunction    Myelodysplastic syndrome, unspecified (CMS/HCC)    Urinary incontinence    AMS (altered mental status)    Weakness of both lower extremities  hypoglycemia    Plan:  Hypoglycemia  Recurrent chronic issue. Pt has been unable to obtain close endo follow up after prior admissions. Contacted endo on call and appreciate input. Further work up is in progress and follow up outpatient. Patient states episodes are asymptomatic  (beta 0.07) (C-peptide 3.0)  No evidence of pancreatic abnormality on imaging. No hx of DM nor chronic insulin use.    Incontinent of urine-urine culture-multiple contaminants, received 3 days rocephin for uncomplicated UTI  Cr at or near patient baseline, 1.48 today  Neurology consulted signed off  Podiatry following for foot 4th digit wound  MRI of the lumbar spine.   secondary to severe spinal stenosis in the lumbar region -Compression Fracture   Consult neuro surgery_ Dr. Kearney-T7 compression fracture -->no indication for urgent surgery per recs, follow up outpatient  PT OT consults appreciated recommend skilled, patient is in agreement with POC  Home medications resumed  -will also need referral to outpatient endo as pt continues to have intermittent hypoglycemia (chronic issue)  Cont am labs cbc, bmp, mag  Time spent > 35 minutes  POC discussed with patient and attending  Dispo: in DC status, awaiting precert        CODE STATUS: FULL  CODE      Angela Cote, APRCHIDIMarymount Hospital  [address] [address]  Phone: [phone] Fax: [phone]

## 2023-10-24 NOTE — PROGRESS NOTES
Physical Therapy    Physical Therapy Treatment    Patient Name: Bing Holliday  MRN: [id number]  Today's Date: 10/24/2023  Time Calculation  Start Time: 1625  Stop Time: 1655  Time Calculation (min): 30 min       Assessment/Plan   PT Assessment  PT Assessment Results: Decreased strength, Decreased range of motion, Decreased endurance, Impaired balance, Decreased mobility, Decreased coordination, Decreased cognition, Decreased safety awareness, Impaired judgement  Rehab Prognosis: Good  Evaluation/Treatment Tolerance: Patient tolerated treatment well  Medical Staff Made Aware: Yes  End of Session Communication: Bedside nurse  Assessment Comment: Pt's impairments include BLE weakness, impaired balance and decreased activity tolerance. Pt's functional limitations include bed mobility, transfers, gait and elevations. Pt would benefit from continued acute care PT during hospital LOS and upon discharge at a moderate level intensity.  End of Session Patient Position: Up in chair, Alarm on     PT Plan  Treatment/Interventions: Bed mobility, Transfer training, Gait training, Balance training, Stair training, Neuromuscular re-education, Strengthening, Endurance training, Range of motion, Therapeutic exercise, Therapeutic activity, Home exercise program, Positioning, Postural re-education  PT Plan: Skilled PT  PT Frequency: 3 times per week  PT Discharge Recommendations: Moderate intensity level of continued care  Equipment Recommended upon Discharge: Wheeled walker  PT Recommended Transfer Status: Assist x1, Assistive device (Bed<>chair/BSC with walker and gait belt.)         10/24/23 1625   PT  Visit   PT Received On 10/24/23   Response to Previous Treatment Patient with no complaints from previous session.   General   Patient Position Received Up in chair;Alarm on   General Comment Pt. agreeable to PT. C/o being tired.fatigued.   Precautions   Precautions Comment Fall risk.   Cognition   Overall Cognitive Status [facility]    Therapeutic Exercise   Therapeutic Exercise Activity 1   (Seated Heel /Toe Raises, LAQ, Hip Abd/add, Seated marches. x 10)   Therapeutic Activity   Therapeutic Activity 1 Static standing with the walker. Slow to stand, Shakey, tremorous.   Ambulation/Gait Training 1   Surface 1 Level tile   Device 1 Rolling walker   Assistance 1   (CGA/Mind to steady, chair follow.)   Quality of Gait 1   (Slow pace. Short step lengths.)   Comments/Distance (ft) 1 12'x1, 8'x1   Transfer 1   Technique 1 Sit to stand;Stand to sit   Transfer Device 1 Walker;Gait belt   Transfer Level of Assistance 1 Minimum assistance   Activity Tolerance   Endurance Tolerates 10 - 20 min exercise with multiple rests   PT Assessment   End of Session Communication Bedside nurse   End of Session Patient Position Up in chair;Alarm on         Outcome Measures:  [facility] Basic Mobility  Turning from your back to your side while in a flat bed without using bedrails: A little  Moving from lying on your back to sitting on the side of a flat bed without using bedrails: A little  Moving to and from bed to chair (including a wheelchair): A little  Standing up from a chair using your arms (e.g. wheelchair or bedside chair): A little  To walk in hospital room: A little  Climbing 3-5 steps with railing: Total  Basic Mobility - Total Score: 16  Education Documentation  Mobility Training, taught by Garret Dunbar PTA at 10/24/2023  7:15 PM.  Learner: Patient  Readiness: Acceptance  Method: Explanation, Demonstration  Response: Verbalizes Understanding, Needs Reinforcement    Education Comments  No comments found.        Current Problem:  1. Weakness of both lower extremities        2. Acute nonintractable headache, unspecified headache type        3. UTI (urinary tract infection), uncomplicated        4. Leg swelling  FENG without exercise    FENG without exercise    CANCELED: Vascular US PVR without exercise    CANCELED: Vascular US PVR without exercise      5. Ulcer of  toe of left foot, with fat layer exposed (CMS/HCC)  FENG without exercise    FENG without exercise    CANCELED: Vascular US PVR without exercise    CANCELED: Vascular US PVR without exercise      6. 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 Medication management  apixaban (Eliquis) 5 mg tablet          EDUCATION:     Encounter Problems       Encounter Problems (Active)       Mobility       Pt. will compete functional mobility with SUP (Progressing)       Start:  10/22/23    Expected End:  10/26/23               PT Problem       Bed mobility (Progressing)       Start:  10/22/23    Expected End:  10/26/23       Pt will perform supine<>sit with HOB flat, GRISELDA.           Transfers (Progressing)       Start:  10/22/23    Expected End:  10/26/23       Pt will perform all transfers with LRAD, GRISELDA.            Gait (Progressing)       Start:  10/22/23    Expected End:  10/26/23       Pt will amb 150' with LRAD, GRISELDA with reciprocal gait, upright posture and improved activity tolerance as demonstrated by vitals.           Stairs (Progressing)       Start:  10/22/23    Expected End:  10/26/23       Pt will ascend/descend 2 steps with LRAD, CGAx1.           Strength (Progressing)       Start:  10/22/23    Expected End:  10/26/23       Pt will improve gross BLE strength to 4/5.            Transfers       STG - Patient will perform toilet transfer SUP (Progressing)       Start:  10/22/23    Expected End:  10/26/23

## 2023-10-24 NOTE — CARE PLAN
The patient's goals for the shift include no injury related to falls able to get some rest/sleep by the end of this shif    The clinical goals for the shift include Pt will be hemodynamically stable throughout this shift      Problem: Fall/Injury  Goal: Verbalize understanding of personal risk factors for fall in the hospital  Outcome: Progressing  Goal: Verbalize understanding of risk factor reduction measures to prevent injury from fall in the home  Outcome: Progressing  Goal: Use assistive devices by end of the shift  Outcome: Progressing  Goal: Pace activities to prevent fatigue by end of the shift  Outcome: Progressing     Problem: Pain  Goal: Takes deep breaths with improved pain control throughout the shift  Outcome: Progressing  Goal: Turns in bed with improved pain control throughout the shift  Outcome: Progressing  Goal: Walks with improved pain control throughout the shift  Outcome: Progressing  Goal: Performs ADL's with improved pain control throughout shift  Outcome: Progressing  Goal: Participates in PT with improved pain control throughout the shift  Outcome: Progressing     Problem: Skin  Goal: Decreased wound size/increased tissue granulation at next dressing change  Outcome: Progressing  Goal: Participates in plan/prevention/treatment measures  Outcome: Progressing  Goal: Prevent/manage excess moisture  Outcome: Progressing  Goal: Prevent/minimize sheer/friction injuries  Outcome: Progressing  Goal: Promote/optimize nutrition  Outcome: Progressing  Goal: Promote skin healing  Outcome: Progressing

## 2023-10-24 NOTE — PROGRESS NOTES
Occupational Therapy        Occupational Therapy    OT Treatment    Patient Name: Bing Holliday  MRN: [id number]  Today's Date: 10/24/2023      Plan:  OT Frequency: 3 times per week  OT Discharge Recommendations: Moderate intensity level of continued care  Equipment Recommended upon Discharge: Wheeled walker     Subjective        10/24/23 0925   OT Last Visit   OT Received On 10/24/23   General   Patient Position Received Bed, 3 rail up   General Comment Pt encountered supine in bed and agreeable to therapy, cleared for tx per nursing. Up in chair at end of session with alarm engaged and call light within reach.   Grooming   Grooming Level of Assistance Close supervision   Grooming Where Assessed Sitting sinkside   Grooming Comments Pt performs oral hygiene and washes face at seated level due to shakiness this date.   LE Dressing   LE Dressing Yes   LE Dressing Where Assessed Edge of bed   LE Dressing Comments Pt dons mesh underwear while seated EOB with Min A to thread R leg through.   Transfer 1   Technique 1 Sit to stand   Transfer Device 1 Walker;Gait belt   Transfer Level of Assistance 1 Contact guard   Transfers 2   Transfer From 2 Bed to   Transfer to 2 Chair with arms   Technique 2 Stand pivot   Transfer Device 2 Walker   Transfer Level of Assistance 2 Minimum assistance   Trials/Comments 2 Increased time needed to complete due to unsteaediness/shakiness.   Inpatient Plan   OT Frequency 3 times per week   OT Discharge Recommendations Moderate intensity level of continued care   Equipment Recommended upon Discharge Wheeled walker       Outcome Measures:[facility] Daily Activity  Putting on and taking off regular lower body clothing: A lot  Bathing (including washing, rinsing, drying): A lot  Putting on and taking off regular upper body clothing: A little  Toileting, which includes using toilet, bedpan or urinal: A little  Taking care of personal grooming such as brushing teeth: A little  Eating Meals: None  Daily  Activity - Total Score: 17  Education Documentation  No documentation found.  Education Comments  No comments found.        Current Problem:  1. Weakness of both lower extremities        2. Acute nonintractable headache, unspecified headache type        3. UTI (urinary tract infection), uncomplicated        4. Leg swelling  FENG without exercise    FENG without exercise    CANCELED: Vascular US PVR without exercise    CANCELED: Vascular US PVR without exercise      5. Ulcer of toe of left foot, with fat layer exposed (CMS/HCC)  FENG without exercise    FENG without exercise    CANCELED: Vascular US PVR without exercise    CANCELED: Vascular US PVR without exercise      6. 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 Medication management  apixaban (Eliquis) 5 mg tablet                    EDUCATION:  Education  Individual(s) Educated: Patient  Education Provided: Fall precautons, Risk and benefits of OT discussed with patient or other, POC discussed and agreed upon  Patient Response to Education: Patient/Caregiver Verbalized Understanding of Information    Goals:  Encounter Problems       Encounter Problems (Active)       Dressings Lower Extremities       STG - Patient to complete lower body dressing MOD I (Progressing)       Start:  10/22/23    Expected End:  10/26/23               Grooming       STG - Patient completes grooming SUP at sink (Progressing)       Start:  10/22/23    Expected End:  10/26/23            STG - Patient will tolerate standing 3-6 min to participate in ADLs (Progressing)       Start:  10/22/23    Expected End:  10/26/23               Mobility       Pt. will compete functional mobility with SUP (Progressing)       Start:  10/22/23    Expected End:  10/26/23               Transfers       STG - Patient will perform toilet transfer SUP (Progressing)       Start:  10/22/23    Expected End:  10/26/23

## 2023-10-25 VITALS
BODY MASS INDEX: 29.2 KG/M2 | HEART RATE: 66 BPM | WEIGHT: 208.56 LBS | RESPIRATION RATE: 18 BRPM | DIASTOLIC BLOOD PRESSURE: 67 MMHG | OXYGEN SATURATION: 100 % | TEMPERATURE: 95.2 F | SYSTOLIC BLOOD PRESSURE: 142 MMHG | HEIGHT: 71 IN

## 2023-10-25 LAB
ANION GAP SERPL CALC-SCNC: 11 MMOL/L (ref 10–20)
BUN SERPL-MCNC: 45 MG/DL (ref 6–23)
CALCIUM SERPL-MCNC: 8.3 MG/DL (ref 8.6–10.3)
CHLORIDE SERPL-SCNC: 114 MMOL/L (ref 98–107)
CO2 SERPL-SCNC: 23 MMOL/L (ref 21–32)
CREAT SERPL-MCNC: 1.62 MG/DL (ref 0.5–1.05)
ERYTHROCYTE [DISTWIDTH] IN BLOOD BY AUTOMATED COUNT: 18.6 % (ref 11.5–14.5)
GFR SERPL CREATININE-BSD FRML MDRD: 36 ML/MIN/1.73M*2
GLUCOSE BLD MANUAL STRIP-MCNC: 150 MG/DL (ref 74–99)
GLUCOSE BLD MANUAL STRIP-MCNC: 25 MG/DL (ref 74–99)
GLUCOSE BLD MANUAL STRIP-MCNC: 64 MG/DL (ref 74–99)
GLUCOSE SERPL-MCNC: 75 MG/DL (ref 74–99)
HCT VFR BLD AUTO: 26.4 % (ref 36–46)
HGB BLD-MCNC: 7.8 G/DL (ref 12–16)
MAGNESIUM SERPL-MCNC: 1.76 MG/DL (ref 1.6–2.4)
MCH RBC QN AUTO: 29 PG (ref 26–34)
MCHC RBC AUTO-ENTMCNC: 29.5 G/DL (ref 32–36)
MCV RBC AUTO: 98 FL (ref 80–100)
NRBC BLD-RTO: 0.8 /100 WBCS (ref 0–0)
PLATELET # BLD AUTO: 90 X10*3/UL (ref 150–450)
PMV BLD AUTO: 12 FL (ref 7.5–11.5)
POTASSIUM SERPL-SCNC: 5.2 MMOL/L (ref 3.5–5.3)
RBC # BLD AUTO: 2.69 X10*6/UL (ref 4–5.2)
SODIUM SERPL-SCNC: 143 MMOL/L (ref 136–145)
WBC # BLD AUTO: 4.8 X10*3/UL (ref 4.4–11.3)

## 2023-10-25 PROCEDURE — 36415 COLL VENOUS BLD VENIPUNCTURE: CPT | Performed by: STUDENT IN AN ORGANIZED HEALTH CARE EDUCATION/TRAINING PROGRAM

## 2023-10-25 PROCEDURE — 83735 ASSAY OF MAGNESIUM: CPT | Mod: 59 | Performed by: STUDENT IN AN ORGANIZED HEALTH CARE EDUCATION/TRAINING PROGRAM

## 2023-10-25 PROCEDURE — 99232 SBSQ HOSP IP/OBS MODERATE 35: CPT | Performed by: NURSE PRACTITIONER

## 2023-10-25 PROCEDURE — 2500000004 HC RX 250 GENERAL PHARMACY W/ HCPCS (ALT 636 FOR OP/ED): Performed by: INTERNAL MEDICINE

## 2023-10-25 PROCEDURE — 2500000002 HC RX 250 W HCPCS SELF ADMINISTERED DRUGS (ALT 637 FOR MEDICARE OP, ALT 636 FOR OP/ED): Performed by: STUDENT IN AN ORGANIZED HEALTH CARE EDUCATION/TRAINING PROGRAM

## 2023-10-25 PROCEDURE — 2500000005 HC RX 250 GENERAL PHARMACY W/O HCPCS: Performed by: INTERNAL MEDICINE

## 2023-10-25 PROCEDURE — 2500000001 HC RX 250 WO HCPCS SELF ADMINISTERED DRUGS (ALT 637 FOR MEDICARE OP): Performed by: NURSE PRACTITIONER

## 2023-10-25 PROCEDURE — 2500000004 HC RX 250 GENERAL PHARMACY W/ HCPCS (ALT 636 FOR OP/ED): Performed by: NURSE PRACTITIONER

## 2023-10-25 PROCEDURE — 94640 AIRWAY INHALATION TREATMENT: CPT

## 2023-10-25 PROCEDURE — 80048 BASIC METABOLIC PNL TOTAL CA: CPT | Performed by: STUDENT IN AN ORGANIZED HEALTH CARE EDUCATION/TRAINING PROGRAM

## 2023-10-25 PROCEDURE — 85027 COMPLETE CBC AUTOMATED: CPT | Mod: 59 | Performed by: STUDENT IN AN ORGANIZED HEALTH CARE EDUCATION/TRAINING PROGRAM

## 2023-10-25 PROCEDURE — 82947 ASSAY GLUCOSE BLOOD QUANT: CPT

## 2023-10-25 PROCEDURE — G0378 HOSPITAL OBSERVATION PER HR: HCPCS

## 2023-10-25 PROCEDURE — 2500000002 HC RX 250 W HCPCS SELF ADMINISTERED DRUGS (ALT 637 FOR MEDICARE OP, ALT 636 FOR OP/ED): Performed by: NURSE PRACTITIONER

## 2023-10-25 RX ADMIN — IPRATROPIUM BROMIDE AND ALBUTEROL SULFATE 3 ML: 2.5; .5 SOLUTION RESPIRATORY (INHALATION) at 08:31

## 2023-10-25 RX ADMIN — DEXTROSE MONOHYDRATE 25 G: 25 INJECTION, SOLUTION INTRAVENOUS at 07:48

## 2023-10-25 RX ADMIN — FOLIC ACID 1 MG: 1 TABLET ORAL at 08:54

## 2023-10-25 RX ADMIN — BUDESONIDE 0.5 MG: 0.5 INHALANT RESPIRATORY (INHALATION) at 08:31

## 2023-10-25 RX ADMIN — PREDNISONE 5 MG: 5 TABLET ORAL at 08:54

## 2023-10-25 RX ADMIN — MULTIPLE VITAMINS W/ MINERALS TAB 1 TABLET: TAB at 08:54

## 2023-10-25 RX ADMIN — APIXABAN 2.5 MG: 2.5 TABLET, FILM COATED ORAL at 08:54

## 2023-10-25 RX ADMIN — ACETAMINOPHEN 975 MG: 325 TABLET ORAL at 10:49

## 2023-10-25 RX ADMIN — MYCOPHENOLATE MOFETIL 500 MG: 250 CAPSULE ORAL at 08:54

## 2023-10-25 RX ADMIN — ACETAMINOPHEN 975 MG: 325 TABLET ORAL at 06:07

## 2023-10-25 RX ADMIN — FLUDROCORTISONE ACETATE 0.1 MG: 0.1 TABLET ORAL at 08:54

## 2023-10-25 RX ADMIN — AMLODIPINE BESYLATE 2.5 MG: 2.5 TABLET ORAL at 08:54

## 2023-10-25 RX ADMIN — PANTOPRAZOLE SODIUM 40 MG: 40 TABLET, DELAYED RELEASE ORAL at 06:08

## 2023-10-25 ASSESSMENT — COGNITIVE AND FUNCTIONAL STATUS - GENERAL
CLIMB 3 TO 5 STEPS WITH RAILING: TOTAL
DRESSING REGULAR UPPER BODY CLOTHING: A LOT
WALKING IN HOSPITAL ROOM: A LITTLE
STANDING UP FROM CHAIR USING ARMS: A LOT
HELP NEEDED FOR BATHING: TOTAL
TOILETING: A LOT
PERSONAL GROOMING: A LOT
MOVING TO AND FROM BED TO CHAIR: A LITTLE
EATING MEALS: A LITTLE
TURNING FROM BACK TO SIDE WHILE IN FLAT BAD: A LITTLE
DRESSING REGULAR LOWER BODY CLOTHING: A LITTLE
DAILY ACTIVITIY SCORE: 13
MOVING FROM LYING ON BACK TO SITTING ON SIDE OF FLAT BED WITH BEDRAILS: A LITTLE
MOBILITY SCORE: 15

## 2023-10-25 ASSESSMENT — PAIN SCALES - GENERAL: PAINLEVEL_OUTOF10: 0 - NO PAIN

## 2023-10-25 NOTE — CARE PLAN
The patient's goals for the shift include no injury related to falls able to get some rest/sleep by the end of this shif    The clinical goals for the shift include Pt will maintain BG >80 throughout this shift    Problem: Fall/Injury  Goal: Verbalize understanding of personal risk factors for fall in the hospital  Outcome: Progressing  Goal: Verbalize understanding of risk factor reduction measures to prevent injury from fall in the home  Outcome: Progressing  Goal: Use assistive devices by end of the shift  Outcome: Progressing  Goal: Pace activities to prevent fatigue by end of the shift  Outcome: Progressing     Problem: Pain  Goal: Takes deep breaths with improved pain control throughout the shift  Outcome: Progressing  Goal: Turns in bed with improved pain control throughout the shift  Outcome: Progressing  Goal: Walks with improved pain control throughout the shift  Outcome: Progressing  Goal: Performs ADL's with improved pain control throughout shift  Outcome: Progressing  Goal: Participates in PT with improved pain control throughout the shift  Outcome: Progressing     Problem: Skin  Goal: Decreased wound size/increased tissue granulation at next dressing change  Outcome: Progressing  Goal: Participates in plan/prevention/treatment measures  Outcome: Progressing  Goal: Prevent/manage excess moisture  Outcome: Progressing  Goal: Prevent/minimize sheer/friction injuries  Outcome: Progressing  Goal: Promote/optimize nutrition  Outcome: Progressing  Goal: Promote skin healing  Outcome: Progressing

## 2023-10-25 NOTE — NURSING NOTE
2 attempts made to call report, no answer with nursing staff, call back number left on answering machine.

## 2023-10-25 NOTE — PROGRESS NOTES
"Bing Holliday is a 62 y.o. female on day 5 of admission presenting with Disorientation.    Subjective   Pt examined and evaluated at bedside, found resting comfortably.  Pt states that their pain is well controlled, denies any constitutional symptoms, and has no other complaints at this time.        Objective   Vascular: DP and PT pulses palpable 1/4 bilaterally.  CFT under 5 seconds when tested at distal digits.  Skin temp warm to warm from proximal to distal.  +2 pitting edema noted to BLE.     Neuro: Protective sensation diminished, light tough sensation intact.  No Tinel's or Valleix's signs elicited.       Derm:  Full thickness non pressure ulcer noted to dorsal 4th digit PIPJ.  No probe to bone or exposed tendon noted at this time.  Wound base is nearly 100% fibrotic with mild serosanguineous drainage no purulence, erythema, proximal streaking, or other SOI noted at this time.  Hyperkeratotic tissue noted to lateral aspect of left plantar foot at fifth metatarsal styloid process, no open lesion upon debridement.  Hyperkeratotic tissue noted to subsecond metatarsal head right foot, no open lesion upon debridement.  No subcutaneous nodules.  Interdigital spaces are CDI.     Musc: Rigid cavus deformity noted to left foot, with nonreducible hammertoe deformities noted to digits 2 through 5 bilaterally.  No other gross deformities noted.  No POP noted to any other area of the foot/ankle.       Last Recorded Vitals  Blood pressure 142/67, pulse 66, temperature 35.1 °C (95.2 °F), temperature source Temporal, resp. rate 18, height 1.803 m (5' 11\"), weight 94.6 kg (208 lb 8.9 oz), SpO2 100 %.  Intake/Output last 3 Shifts:  I/O last 3 completed shifts:  In: 2679.7 (28.3 mL/kg) [P.O.:480; I.V.:2149.7 (22.7 mL/kg); IV Piggyback:50]  Out: 850 (9 mL/kg) [Urine:850 (0.2 mL/kg/hr)]  Weight: 94.6 kg     Relevant Results  Scheduled medications  acetaminophen, 975 mg, oral, q8h  amLODIPine, 2.5 mg, oral, Daily  apixaban, 2.5 " mg, oral, BID  atorvastatin, 40 mg, oral, Nightly  budesonide, 0.5 mg, nebulization, BID  cefTRIAXone, 1 g, intravenous, q24h  fludrocortisone, 0.1 mg, oral, Every other day  folic acid, 1 mg, oral, Daily  insulin lispro, 0-10 Units, subcutaneous, Before meals & nightly  ipratropium-albuteroL, 3 mL, nebulization, TID  lidocaine, 1 patch, transdermal, Daily  melatonin, 5 mg, oral, Nightly  multivitamin with minerals, 1 tablet, oral, Daily  mycophenolate, 500 mg, oral, BID  pantoprazole, 40 mg, oral, Daily before breakfast   Or  pantoprazole, 40 mg, intravenous, Daily before breakfast  predniSONE, 5 mg, oral, Daily  risperiDONE, 0.5 mg, oral, Nightly  [Held by provider] torsemide, 10 mg, oral, Daily      Continuous medications       PRN medications  PRN medications: dextrose, glucagon, loperamide, melatonin, metoclopramide **OR** metoclopramide, ondansetron ODT **OR** ondansetron, oxyCODONE, polyethylene glycol      Results for orders placed or performed during the hospital encounter of 10/19/23 (from the past 24 hour(s))   POCT GLUCOSE   Result Value Ref Range    POCT Glucose 167 (H) 74 - 99 mg/dL   POCT GLUCOSE   Result Value Ref Range    POCT Glucose 180 (H) 74 - 99 mg/dL   CBC   Result Value Ref Range    WBC 4.8 4.4 - 11.3 x10*3/uL    nRBC 0.8 (H) 0.0 - 0.0 /100 WBCs    RBC 2.69 (L) 4.00 - 5.20 x10*6/uL    Hemoglobin 7.8 (L) 12.0 - 16.0 g/dL    Hematocrit 26.4 (L) 36.0 - 46.0 %    MCV 98 80 - 100 fL    MCH 29.0 26.0 - 34.0 pg    MCHC 29.5 (L) 32.0 - 36.0 g/dL    RDW 18.6 (H) 11.5 - 14.5 %    Platelets 90 (L) 150 - 450 x10*3/uL    MPV 12.0 (H) 7.5 - 11.5 fL   Basic Metabolic Panel   Result Value Ref Range    Glucose 75 74 - 99 mg/dL    Sodium 143 136 - 145 mmol/L    Potassium 5.2 3.5 - 5.3 mmol/L    Chloride 114 (H) 98 - 107 mmol/L    Bicarbonate 23 21 - 32 mmol/L    Anion Gap 11 10 - 20 mmol/L    Urea Nitrogen 45 (H) 6 - 23 mg/dL    Creatinine 1.62 (H) 0.50 - 1.05 mg/dL    eGFR 36 (L) >60 mL/min/1.73m*2    Calcium  8.3 (L) 8.6 - 10.3 mg/dL   Magnesium   Result Value Ref Range    Magnesium 1.76 1.60 - 2.40 mg/dL   POCT GLUCOSE   Result Value Ref Range    POCT Glucose 25 (L) 74 - 99 mg/dL   POCT GLUCOSE   Result Value Ref Range    POCT Glucose 150 (H) 74 - 99 mg/dL   POCT GLUCOSE   Result Value Ref Range    POCT Glucose 64 (L) 74 - 99 mg/dL      CT angio brain attack head w and wo IV contrast and post procedure          Interpreted By:  Jairo Moreno,   STUDY:  CT ANGIO BRAIN ATTACK HEAD W AND WO IV CONTRAST AND POST PROCEDURE;  CT ANGIO BRAIN ATTACK NECK W IV CONTRAST AND POST PROCEDURE;  10/19/2023 2:57 pm      INDICATION:  Signs/Symptoms:hemorrhagic stroke alert; Signs/Symptoms:bat.      COMPARISON:  Head CT, same day      ACCESSION NUMBER(S):  [id number]; [id number]      ORDERING CLINICIAN:  JADA BURRELL      TECHNIQUE:  50 mL Omnipaque 350 was administered intravenously and axial images  of the head and neck were acquired. Coronal and sagittal MIPs and 3-D  reconstructions were created on an independent workstation and  provided for review. Image quality is significantly degraded by poor  bolus timing.      FINDINGS:  CTA Head:      There is minimal arterial phase enhancement, no proximal large vessel  occlusion in the [id number].      CTA Neck:      There is minimal arterial phase enhancement, no high-grade narrowing  or occlusion of the major cervical arteries.      The soft tissues of the neck are unremarkable. The visualized lungs  are clear. Dental caries with associated periapical lucency in the  left maxilla. Degenerative changes in the spine and partially  visualized glenohumeral joints.      IMPRESSION:  Limited exam due to poor bolus timing, no large vessel occlusion in  the head or neck.      MACRO:  None      Signed by: Jairo Moreno 10/19/2023 3:08 PM  Dictation workstation:   [id number]         CBC and Auto Differential        Component Value Flag Ref Range Units Status    WBC 3.7      4.4 -  11.3 x10*3/uL Final    nRBC 0.0      0.0 - 0.0 /100 WBCs Final    RBC 3.20      4.00 - 5.20 x10*6/uL Final    Hemoglobin 9.2      12.0 - 16.0 g/dL Final    Hematocrit 30.8      36.0 - 46.0 % Final    MCV 96      80 - 100 fL Final    MCH 28.8      26.0 - 34.0 pg Final    MCHC 29.9      32.0 - 36.0 g/dL Final    RDW 17.9      11.5 - 14.5 % Final    Platelets 100      150 - 450 x10*3/uL Final    MPV 13.5      7.5 - 11.5 fL Final    Immature Granulocytes %, Automated 0.0      0.0 - 0.9 % Final    Comment:    Immature Granulocyte Count (IG) includes promyelocytes, myelocytes and metamyelocytes but does not include bands. Percent differential counts (%) should be interpreted in the context of the absolute cell counts (cells/UL).    Immature Granulocytes Absolute, Automated 0.00      0.00 - 0.70 x10*3/uL Final                  Comprehensive metabolic panel        Component Value Flag Ref Range Units Status    Glucose 103      74 - 99 mg/dL Final    Sodium 141      136 - 145 mmol/L Final    Potassium 5.4      3.5 - 5.3 mmol/L Final    Chloride 113      98 - 107 mmol/L Final    Bicarbonate 23      21 - 32 mmol/L Final    Anion Gap 10      10 - 20 mmol/L Final    Urea Nitrogen 55      6 - 23 mg/dL Final    Creatinine 1.47      0.50 - 1.05 mg/dL Final    eGFR 40      >60 mL/min/1.73m*2 Final    Comment:    Calculations of estimated GFR are performed using the 2021 CKD-EPI Study Refit equation without the race variable for the IDMS-Traceable creatinine methods.  https://jasn.asnjournals.org/content/early/2021/09/22/ASN.7932081487    Calcium 8.9      8.6 - 10.3 mg/dL Final    Albumin 3.2      3.4 - 5.0 g/dL Final    Alkaline Phosphatase 98      33 - 136 U/L Final    Total Protein 6.2      6.4 - 8.2 g/dL Final    AST 37      9 - 39 U/L Final    Bilirubin, Total 0.2      0.0 - 1.2 mg/dL Final    ALT 28      7 - 45 U/L Final    Comment:    Patients treated with Sulfasalazine may generate falsely decreased results for ALT.                   Troponin I, High Sensitivity        Component Value Flag Ref Range Units Status    Troponin I, High Sensitivity 12      0 - 13 ng/L Final                  Protime-INR        Component Value Flag Ref Range Units Status    Protime 14.9      9.8 - 12.8 seconds Final    INR 1.3      0.9 - 1.1  Final                  APTT        Component Value Flag Ref Range Units Status    aPTT 55      27 - 38 seconds Final                  ECG 12 lead        Component Value Flag Ref Range Units Status    Ventricular Rate 57       BPM Final    Atrial Rate 57       BPM Final    ME Interval 184       ms Final    QRS Duration 100       ms Final    QT Interval 454       ms Final    QTC Calculation(Bazett) 441       ms Final    P Axis 56       degrees Final    R Axis -19       degrees Final    T Axis 21       degrees Final    QRS Count 10       beats Final    Q Onset 217       ms Final    P Onset 125       ms Final    P Offset 185       ms Final    T Offset 444       ms Final    QTC Fredericia 446       ms Final                 Sinus bradycardia  Voltage criteria for left ventricular hypertrophy  Abnormal ECG  When compared with ECG of 30-AUG-2023 09:00,  Sinus rhythm has replaced Atrial fibrillation  Vent. rate has decreased BY  37 BPM  QRS duration has increased  Criteria for Septal infarct are no longer Present  Criteria for Inferior infarct are no longer Present  ST elevation has replaced ST depression in Lateral leads  Confirmed by Caterina Dixon ([id number]) on 10/20/2023 11:20:43 PM         TSH with reflex to Free T4 if abnormal        Component Value Flag Ref Range Units Status    Thyroid Stimulating Hormone 4.72      0.44 - 3.98 mIU/L Final                  Magnesium        Component Value Flag Ref Range Units Status    Magnesium 1.88      1.60 - 2.40 mg/dL Final                  XR chest 1 view          Interpreted By:  Nic Moseley,   STUDY:  XR CHEST 1 VIEW  10/19/2023 3:11 pm      INDICATION:  Signs/Symptoms:bat  weakness      COMPARISON:  08/27/2023      ACCESSION NUMBER(S):  [id number]      ORDERING CLINICIAN:  JADA BURRELL      TECHNIQUE:  A single AP portable radiograph of the chest was obtained.      FINDINGS:  Multiple cardiac monitoring leads are seen over the chest.   No focal  infiltrate, pleural effusion or pneumothorax is identified. The  cardiac silhouette is prominent, similar to prior studies.      IMPRESSION:  No focal infiltrate or pneumothorax is identified.      MACRO:  None.      Signed by: Nic Moseley 10/19/2023 3:13 PM  Dictation workstation:   [id number]         Urinalysis with Reflex Microscopic and Culture        Component Value Flag Ref Range Units Status    Color, Urine Yellow      Straw, Yellow  Final    Appearance, Urine Hazy   (Normal)   Clear  Final    Specific Gravity, Urine 1.006      1.005 - 1.035  Final    pH, Urine 6.0      5.0, 5.5, 6.0, 6.5, 7.0, 7.5, 8.0  Final    Protein, Urine 30 (1+)   (Normal)   NEGATIVE mg/dL Final    Glucose, Urine NEGATIVE      NEGATIVE mg/dL Final    Blood, Urine NEGATIVE      NEGATIVE  Final    Ketones, Urine NEGATIVE      NEGATIVE mg/dL Final    Bilirubin, Urine NEGATIVE      NEGATIVE  Final    Urobilinogen, Urine <2.0      <2.0 mg/dL Final    Nitrite, Urine NEGATIVE      NEGATIVE  Final    Leukocyte Esterase, Urine LARGE (3+)      NEGATIVE  Final                  Extra Urine Gray Tube        Component    Extra Tube    Hold for add-ons.      Comment:    Auto resulted.                  CT angio brain attack neck w IV contrast and post procedure          Interpreted By:  Jairo Moreno,   STUDY:  CT ANGIO BRAIN ATTACK HEAD W AND WO IV CONTRAST AND POST PROCEDURE;  CT ANGIO BRAIN ATTACK NECK W IV CONTRAST AND POST PROCEDURE;  10/19/2023 2:57 pm      INDICATION:  Signs/Symptoms:hemorrhagic stroke alert; Signs/Symptoms:bat.      COMPARISON:  Head CT, same day      ACCESSION NUMBER(S):  [id number]; [id number]      ORDERING CLINICIAN:  JADA BURRELL       TECHNIQUE:  50 mL Omnipaque 350 was administered intravenously and axial images  of the head and neck were acquired. Coronal and sagittal MIPs and 3-D  reconstructions were created on an independent workstation and  provided for review. Image quality is significantly degraded by poor  bolus timing.      FINDINGS:  CTA Head:      There is minimal arterial phase enhancement, no proximal large vessel  occlusion in the [id number].      CTA Neck:      There is minimal arterial phase enhancement, no high-grade narrowing  or occlusion of the major cervical arteries.      The soft tissues of the neck are unremarkable. The visualized lungs  are clear. Dental caries with associated periapical lucency in the  left maxilla. Degenerative changes in the spine and partially  visualized glenohumeral joints.      IMPRESSION:  Limited exam due to poor bolus timing, no large vessel occlusion in  the head or neck.      MACRO:  None      Signed by: Jairo Moreno 10/19/2023 3:08 PM  Dictation workstation:   [id number]         CT brain attack head wo IV contrast          Interpreted By:  Gaston Beyer,   STUDY:  CT BRAIN ATTACK HEAD WO IV CONTRAST;  10/19/2023 2:38 pm      INDICATION:  Signs/Symptoms:Stroke Evaluation.      COMPARISON:  08/28/2023      ACCESSION NUMBER(S):  [id number]      ORDERING CLINICIAN:  JADA BURRELL      TECHNIQUE:  Sequential trans axial images were obtained  .      FINDINGS:  INTRACRANIAL:      CORTICAL SULCI AND EXTRA-AXIAL SPACES:  Unremarkable.      VENTRICULAR SYSTEM:  Unremarkable without significant dilatation.      CEREBRAL PARENCHYMA:  There is mild hypodensity at the long tracks of  the white matter, particularly in the frontoparietal regions greater  on the left similar to the prior exam most likely due to gliosis from  arteriolar disease.There is also a small area of  encephalomalacia/gliosis at the left occipital lobe most likely from  a remote infarction, also similar to the prior  exam. Otherwisethere  is no evidence of definite subacute infarction, intracranial  hemorrhage or mass.      EXTRACRANIAL:  Visualized paranasal sinuses and mastoids are clear.  The calvarium is intact.      IMPRESSION:  Mild age related degenerative change as described without acute  findings or significant change from the prior exam. No intracranial  hemorrhage.      MACRO:  Gaston Beyer discussed the significance and urgency of this critical  finding by secure chat with  JADA BURRELL on 10/19/2023 at 2:42  pm.  (**-RCF-**) Findings:  See findings.          Signed by: Gaston Beyer 10/19/2023 2:44 PM  Dictation workstation:   [id number]         POCT GLUCOSE        Component Value Flag Ref Range Units Status    POCT Glucose 101      74 - 99 mg/dL Final                  Cardiac Enzymes - CPK        Component Value Flag Ref Range Units Status    Creatine Kinase 75      0 - 215 U/L Final                  Thyroxine, Free        Component Value Flag Ref Range Units Status    Thyroxine, Free 0.82      0.61 - 1.12 ng/dL Final                  Manual Differential        Component Value Flag Ref Range Units Status    Neutrophils %, Manual 74.0      40.0 - 80.0 % Final    Comment:    Percent differential counts (%) should be interpreted in the context of the absolute cell counts (cells/uL).    Bands %, Manual 2.0      0.0 - 5.0 % Final    Lymphocytes %, Manual 15.0      13.0 - 44.0 % Final    Monocytes %, Manual 6.0      2.0 - 10.0 % Final    Eosinophils %, Manual 1.0      0.0 - 6.0 % Final    Basophils %, Manual 2.0      0.0 - 2.0 % Final    Seg Neutrophils Absolute, Manual 2.74      1.20 - 7.00 x10*3/uL Final    Bands Absolute, Manual 0.07      0.00 - 0.70 x10*3/uL Final    Lymphocytes Absolute, Manual 0.56      1.20 - 4.80 x10*3/uL Final    Monocytes Absolute, Manual 0.22      0.10 - 1.00 x10*3/uL Final    Eosinophils Absolute, Manual 0.04      0.00 - 0.70 x10*3/uL Final    Basophils Absolute, Manual 0.07      0.00 -  0.10 x10*3/uL Final    Total Cells Counted 100        Final    Neutrophils Absolute, Manual 2.81      1.20 - 7.70 x10*3/uL Final    RBC Morphology See Below        Final    RBC Fragments Few        Final    Teardrop Cells Few        Final    Katarzyna Cells Few        Final    Giant Platelets Few        Final                  CT cervical spine wo IV contrast          Interpreted By:  Mariia Concepcion,   STUDY:  CT CERVICAL SPINE WO IV CONTRAST; CT LUMBAR SPINE WO IV CONTRAST; CT  THORACIC SPINE WO IV CONTRAST;  10/19/2023 5:07 pm      INDICATION:  Signs/Symptoms:per neuro request; Signs/Symptoms:per neuro request  for lower extremity weakness.      COMPARISON:  None.      ACCESSION NUMBER(S):  [id number]; [id number]; [id number]      ORDERING CLINICIAN:  JADA BURRELL      TECHNIQUE:  Axial CT images of the cervical spine are obtained. Axial, coronal  and sagittal reconstructions are provided for review. Axial CT images  of the thoracic spine are obtained. Axial, coronal and sagittal  reconstructions are provided for review.Axial CT images of the lumbar  spine are obtained. Axial, coronal and sagittal reconstructions are  provided for review.      FINDINGS:  Cervical spine: No acute fracture or subluxation. Reversal of normal  cervical lordosis in the midcervical spine. Mild-moderate disc height  loss seen at C3-C4, C4-C5, C5-C6, and C6-C7 with multilevel endplate  erosions, greatest at C5-C6 and C6-C7. Multilevel marginal  osteophytes C3-C7. Multilevel facet arthropathy in the cervical  spine. Moderate right-sided neural foraminal narrowing at C3-C4,  severe on the left at C4-C5, mild on the left at C5-C6, and moderate  bilaterally at C6-C7. No prevertebral hematoma.      Thoracic spine: Moderate compression deformity in T7 vertebral body,  age indeterminate. Levocurvature in the lower thoracic spine.  Scattered facet arthropathy. Multilevel vacuum phenomenon T9-T10,  T10-T11, and T11-T12. Subcentimeter bone  island in T1 vertebral body.  There is dense material in the urinary system bilaterally suggestive  of excreted contrast. Heavy coronary artery calcifications. Streaky  opacities in the lungs dependently.      Lumbar spine: Multilevel advanced disc height loss L2-L3, L3-L4, and  L4-L5 with endplate erosions and marginal osteophyte formation.  Levoscoliosis of the lumbar spine. Facet arthropathy in the lumbar  spine, greatest inferiorly. Multilevel spinal canal stenosis in the  lower lumbar spine, at L2-L3 measuring 6 mm, at L3-L4 measuring 5 mm,  and at L4-L5 measuring 4 mm. Bilateral neural foraminal narrowing is  also seen at these levels. Partially visualized right hip  arthroplasty with associated streak artifact. Sigmoid colonic  diverticulosis. Atherosclerosis.      IMPRESSION:  No acute osseous abnormality in the cervical spine. Multilevel  degenerative change and neural foraminal narrowing.      Moderate age-indeterminate compression fracture T7 vertebral body.      No acute osseous abnormality in the lumbar spine. Multilevel severe  canal stenosis as well as neural foraminal narrowing.      Signed by: Mariia Concepcion 10/19/2023 6:06 PM  Dictation workstation:   [id number]         CT thoracic spine wo IV contrast          Interpreted By:  Mariia Concepcion,   STUDY:  CT CERVICAL SPINE WO IV CONTRAST; CT LUMBAR SPINE WO IV CONTRAST; CT  THORACIC SPINE WO IV CONTRAST;  10/19/2023 5:07 pm      INDICATION:  Signs/Symptoms:per neuro request; Signs/Symptoms:per neuro request  for lower extremity weakness.      COMPARISON:  None.      ACCESSION NUMBER(S):  [id number]; [id number]; [id number]      ORDERING CLINICIAN:  JADA BURRELL      TECHNIQUE:  Axial CT images of the cervical spine are obtained. Axial, coronal  and sagittal reconstructions are provided for review. Axial CT images  of the thoracic spine are obtained. Axial, coronal and sagittal  reconstructions are provided for review.Axial CT images of  the lumbar  spine are obtained. Axial, coronal and sagittal reconstructions are  provided for review.      FINDINGS:  Cervical spine: No acute fracture or subluxation. Reversal of normal  cervical lordosis in the midcervical spine. Mild-moderate disc height  loss seen at C3-C4, C4-C5, C5-C6, and C6-C7 with multilevel endplate  erosions, greatest at C5-C6 and C6-C7. Multilevel marginal  osteophytes C3-C7. Multilevel facet arthropathy in the cervical  spine. Moderate right-sided neural foraminal narrowing at C3-C4,  severe on the left at C4-C5, mild on the left at C5-C6, and moderate  bilaterally at C6-C7. No prevertebral hematoma.      Thoracic spine: Moderate compression deformity in T7 vertebral body,  age indeterminate. Levocurvature in the lower thoracic spine.  Scattered facet arthropathy. Multilevel vacuum phenomenon T9-T10,  T10-T11, and T11-T12. Subcentimeter bone island in T1 vertebral body.  There is dense material in the urinary system bilaterally suggestive  of excreted contrast. Heavy coronary artery calcifications. Streaky  opacities in the lungs dependently.      Lumbar spine: Multilevel advanced disc height loss L2-L3, L3-L4, and  L4-L5 with endplate erosions and marginal osteophyte formation.  Levoscoliosis of the lumbar spine. Facet arthropathy in the lumbar  spine, greatest inferiorly. Multilevel spinal canal stenosis in the  lower lumbar spine, at L2-L3 measuring 6 mm, at L3-L4 measuring 5 mm,  and at L4-L5 measuring 4 mm. Bilateral neural foraminal narrowing is  also seen at these levels. Partially visualized right hip  arthroplasty with associated streak artifact. Sigmoid colonic  diverticulosis. Atherosclerosis.      IMPRESSION:  No acute osseous abnormality in the cervical spine. Multilevel  degenerative change and neural foraminal narrowing.      Moderate age-indeterminate compression fracture T7 vertebral body.      No acute osseous abnormality in the lumbar spine. Multilevel severe  canal  stenosis as well as neural foraminal narrowing.      Signed by: Mariia Concepcion 10/19/2023 6:06 PM  Dictation workstation:   [id number]         CT lumbar spine wo IV contrast          Interpreted By:  Mariia Concepcion,   STUDY:  CT CERVICAL SPINE WO IV CONTRAST; CT LUMBAR SPINE WO IV CONTRAST; CT  THORACIC SPINE WO IV CONTRAST;  10/19/2023 5:07 pm      INDICATION:  Signs/Symptoms:per neuro request; Signs/Symptoms:per neuro request  for lower extremity weakness.      COMPARISON:  None.      ACCESSION NUMBER(S):  [id number]; [id number]; [id number]      ORDERING CLINICIAN:  JADA BURRELL      TECHNIQUE:  Axial CT images of the cervical spine are obtained. Axial, coronal  and sagittal reconstructions are provided for review. Axial CT images  of the thoracic spine are obtained. Axial, coronal and sagittal  reconstructions are provided for review.Axial CT images of the lumbar  spine are obtained. Axial, coronal and sagittal reconstructions are  provided for review.      FINDINGS:  Cervical spine: No acute fracture or subluxation. Reversal of normal  cervical lordosis in the midcervical spine. Mild-moderate disc height  loss seen at C3-C4, C4-C5, C5-C6, and C6-C7 with multilevel endplate  erosions, greatest at C5-C6 and C6-C7. Multilevel marginal  osteophytes C3-C7. Multilevel facet arthropathy in the cervical  spine. Moderate right-sided neural foraminal narrowing at C3-C4,  severe on the left at C4-C5, mild on the left at C5-C6, and moderate  bilaterally at C6-C7. No prevertebral hematoma.      Thoracic spine: Moderate compression deformity in T7 vertebral body,  age indeterminate. Levocurvature in the lower thoracic spine.  Scattered facet arthropathy. Multilevel vacuum phenomenon T9-T10,  T10-T11, and T11-T12. Subcentimeter bone island in T1 vertebral body.  There is dense material in the urinary system bilaterally suggestive  of excreted contrast. Heavy coronary artery calcifications. Streaky  opacities in  the lungs dependently.      Lumbar spine: Multilevel advanced disc height loss L2-L3, L3-L4, and  L4-L5 with endplate erosions and marginal osteophyte formation.  Levoscoliosis of the lumbar spine. Facet arthropathy in the lumbar  spine, greatest inferiorly. Multilevel spinal canal stenosis in the  lower lumbar spine, at L2-L3 measuring 6 mm, at L3-L4 measuring 5 mm,  and at L4-L5 measuring 4 mm. Bilateral neural foraminal narrowing is  also seen at these levels. Partially visualized right hip  arthroplasty with associated streak artifact. Sigmoid colonic  diverticulosis. Atherosclerosis.      IMPRESSION:  No acute osseous abnormality in the cervical spine. Multilevel  degenerative change and neural foraminal narrowing.      Moderate age-indeterminate compression fracture T7 vertebral body.      No acute osseous abnormality in the lumbar spine. Multilevel severe  canal stenosis as well as neural foraminal narrowing.      Signed by: Mariia Concepcion 10/19/2023 6:06 PM  Dictation workstation:   [id number]         TSH with reflex to Free T4 if abnormal        Component Value Flag Ref Range Units Status    Thyroid Stimulating Hormone 4.65      0.44 - 3.98 mIU/L Final                  C3 complement        Component Value Flag Ref Range Units Status    C3 Complement 140      87 - 200 mg/dL Final                  C4 complement        Component Value Flag Ref Range Units Status    C4 Complement 54      10 - 50 mg/dL Final                  C-reactive protein        Component Value Flag Ref Range Units Status    C-Reactive Protein 0.63      <1.00 mg/dL Final                  Creatine Kinase        Component Value Flag Ref Range Units Status    Creatine Kinase 81      0 - 215 U/L Final                  Creatine Kinase, MB        Component Value Flag Ref Range Units Status    CKMB 2.5      <10.0 ng/mL Final    Comment:    REF VALUES  CKMB  <7 and RI <4% :Negative  CKMB  <7 and RI >4% :Equivocal  CKMB >=7 and RI <4%  :Equivocal  CKMB >=7 and RI >4% :Positive      CK-MB Index         Final    Comment:    RI: 3%MB of CK                  Microscopic Only, Urine        Component Value Flag Ref Range Units Status    WBC, Urine 21-50      1-5, NONE /HPF Final    RBC, Urine 3-5      NONE, 1-2, 3-5 /HPF Final    Bacteria, Urine 2+      NONE SEEN /HPF Final                  Urine Culture        Specimen Information: Clean Catch/Voided; Urine      Urine Culture       Multiple organisms present, probable contamination. Repeat culture if clinically indicated.                                  Thyroxine, Free        Component Value Flag Ref Range Units Status    Thyroxine, Free 0.86      0.61 - 1.12 ng/dL Final                  Aldolase        Component Value Flag Ref Range Units Status    Aldolase 23.9      1.2 - 7.6 U/L Final    Comment:    This specimen is Hemolyzed. This may cause the results to be   falsely increased.  REFERENCE INTERVAL: Aldolase    Access complete set of age- and/or gender-specific reference   intervals for this test in the [facility] Test Directory   ([email or web address]).  Performed By: [facility]  [address] [place] [address]  : Peter Tejeda MD, PhD  CLIA Number: [id number]                  MR lumbar spine wo IV contrast          Interpreted By:  Lo Judge,   STUDY:  MR LUMBAR SPINE WO IV CONTRAST;  10/19/2023 9:16 pm      INDICATION:  Signs/Symptoms:incontinence new onset.      COMPARISON:  CT lumbar spine 10/19/2023, MRI 05/05/2021      ACCESSION NUMBER(S):  [id number]      ORDERING CLINICIAN:  JADA BURRELL      TECHNIQUE:  Sagittal T1, T2, STIR, axial T1 and T2 weighted images of the lumbar  spine were acquired.      FINDINGS:  Alignment: Reversal of the cervical curvature and dextroscoliosis  centered about L3, similar to prior imaging. Minimal retrolisthesis  of L3 on L4. Minimal retrolisthesis of L4 on L5.      Vertebrae/Intervertebral Discs: Multilevel  chronic appearing  vertebral body height loss most prominent at L2, L3 and L4 similar to  prior imaging.Mild chronic endplate bone marrow signal changes. No  significant bone marrow edema identified. Probable osseous  demineralization. Multilevel intervertebral disc space narrowing and  disc desiccation without significant intervertebral disc space edema.      Conus: The lower thoracic cord appears unremarkable. The conus  terminates at L1.      T12-L1: Disc osteophyte complex and posterior ligamentous hypertrophy  with facet arthropathy resulting in mild-to-moderate canal narrowing.  Mild foraminal narrowing.      L1-2: Mild disc bulge and facet hypertrophy with moderate canal  narrowing and moderate left lateral recess stenosis. Mild right and  moderate left foraminal narrowing.      L2-3: Disc osteophyte complex and facet hypertrophy with moderate  canal and moderate to severe lateral recess stenosis. Mild to  moderate foraminal stenosis.      L3-4: Central to left paracentral disc osteophyte complex with  retrolisthesis, ligamentous hypertrophy and facet arthropathy  resulting in severe canal stenosis, similar are mildly progressed  from prior imaging. Moderate right and severe left foraminal stenosis.      L4-5: Disc bulge and posterior ligamentous hypertrophy with facet  arthropathy resulting in at least moderate canal narrowing. Mild to  moderate left and severe right foraminal stenosis.      L5-S1: Disc bulge and facet hypertrophy with mild canal narrowing and  moderate left lateral recess stenosis. Moderate right and mild left  foraminal narrowing.      The prevertebral and posterior paraspinous soft tissues are  unremarkable. A probable left renal cyst.      IMPRESSION:  Extensive degenerative changes with reversal of the lumbar lordosis  and severe L3-L4 canal stenosis, similar to slightly progressed from  05/05/2021. Additional findings of foraminal and lateral recess  narrowing as described.      No  evidence of acute fracture or traumatic subluxation.      MACRO:  None      Signed by: Lo Judge 10/19/2023 10:00 PM  Dictation workstation:   [id number]         CBC        Component Value Flag Ref Range Units Status    WBC 4.4      4.4 - 11.3 x10*3/uL Final    nRBC 0.0      0.0 - 0.0 /100 WBCs Final    RBC 2.98      4.00 - 5.20 x10*6/uL Final    Hemoglobin 8.5      12.0 - 16.0 g/dL Final    Hematocrit 28.3      36.0 - 46.0 % Final    MCV 95      80 - 100 fL Final    MCH 28.5      26.0 - 34.0 pg Final    MCHC 30.0      32.0 - 36.0 g/dL Final    RDW 17.8      11.5 - 14.5 % Final    Platelets 100      150 - 450 x10*3/uL Final    MPV         Final    Comment:    Not Measured                  Comprehensive metabolic panel        Component Value Flag Ref Range Units Status    Glucose 61      74 - 99 mg/dL Final    Sodium 143      136 - 145 mmol/L Final    Potassium 5.1      3.5 - 5.3 mmol/L Final    Chloride 113      98 - 107 mmol/L Final    Bicarbonate 23      21 - 32 mmol/L Final    Anion Gap 12      10 - 20 mmol/L Final    Urea Nitrogen 56      6 - 23 mg/dL Final    Creatinine 1.52      0.50 - 1.05 mg/dL Final    eGFR 39      >60 mL/min/1.73m*2 Final    Comment:    Calculations of estimated GFR are performed using the 2021 CKD-EPI Study Refit equation without the race variable for the IDMS-Traceable creatinine methods.  https://jasn.asnjournals.org/content/early/2021/09/22/ASN.4723357374    Calcium 8.6      8.6 - 10.3 mg/dL Final    Albumin 3.1      3.4 - 5.0 g/dL Final    Alkaline Phosphatase 105      33 - 136 U/L Final    Total Protein 6.1      6.4 - 8.2 g/dL Final    AST 32      9 - 39 U/L Final    Bilirubin, Total 0.2      0.0 - 1.2 mg/dL Final    ALT 26      7 - 45 U/L Final    Comment:    Patients treated with Sulfasalazine may generate falsely decreased results for ALT.                  POCT GLUCOSE        Component Value Flag Ref Range Units Status    POCT Glucose 67      74 - 99 mg/dL Final                   MR thoracic spine wo IV contrast          Interpreted By:  Juan A Betancourt,   STUDY:  MR THORACIC SPINE WO IV CONTRAST; ;  10/20/2023 3:19 pm      INDICATION:  Signs/Symptoms:t7 compression fracture, bilateral lower extremity  weakness, intermittent urinary incontinence.      COMPARISON:  CT thoracic spine from 10/19/2023. MRI thoracic spine from 05/05/2021.      ACCESSION NUMBER(S):  [id number]      ORDERING CLINICIAN:  HARJEET SO      TECHNIQUE:  MRI of the thoracic spine was performed with acquisition of sagittal  T2, sagittal T1, sagittal STIR, axial T1, and axial T2 weighted  sequences.      FINDINGS:  There is mild S shaped curvature of the thoracic spine with  dextrocurvature superiorly and levocurvature inferiorly. There is  stable grade 1 anterolisthesis at T3-T4 and mild retrolisthesis at  T11-T12. Otherwise, there is no striking spondylolisthesis at any  level within the thoracic spine.      There is 40-45% height loss of the T7 vertebral body, unchanged since  the MRI from 05/05/2021. There is no retropulsion into the spinal  canal. Remaining thoracic vertebral body heights are maintained.  There is no edematous signal located within the visualized vertebral  bodies.      There is mild intervertebral disc height narrowing at few levels with  chronic degenerative endplate changes, similar to the prior MRI.      There are disc bulges/protrusions at multiple levels within the  thoracic spine which cause variable degree of ventral thecal sac  effacement but no spinal canal stenosis. Disc bulge/protrusion with  osteophytic spurring is most pronounced at the level of T11-T12,  unchanged. There is variable degree of neural foraminal narrowing at  multiple levels due to degenerative changes including moderate  bilateral neural foraminal narrowing at T7-T8 and mild right neural  foraminal narrowing at T8-T9. There is stable marked bilateral neural  foraminal narrowing at T9-T10 and marked right neural  foraminal  narrowing at T10-T11. There is stable marked bilateral neural  foraminal narrowing at T11-T12.      There is facet osteoarthropathy at multiple levels.      The thoracic spinal cord is normal in signal and caliber.      IMPRESSION:  1. Stable moderate compression fracture of the T7 vertebral body  compared to the MRI from 05/05/2021. There is no retropulsion into  the spinal canal. Remaining vertebral body heights are maintained.      2. Multilevel degenerative changes of the thoracic spine are  unchanged since the prior MRI.      This study was interpreted at [facility].          MACRO:  None      Signed by: Juan A Betancourt 10/20/2023 3:31 PM  Dictation workstation:   [id number]         POCT GLUCOSE        Component Value Flag Ref Range Units Status    POCT Glucose 48      74 - 99 mg/dL Final                  POCT GLUCOSE        Component Value Flag Ref Range Units Status    POCT Glucose 46      74 - 99 mg/dL Final    Comment:    RN/MD NOTIFIED                  POCT GLUCOSE        Component Value Flag Ref Range Units Status    POCT Glucose 130      74 - 99 mg/dL Final                  POCT GLUCOSE        Component Value Flag Ref Range Units Status    POCT Glucose 74      74 - 99 mg/dL Final                  CBC        Component Value Flag Ref Range Units Status    WBC 3.9      4.4 - 11.3 x10*3/uL Final    nRBC 0.0      0.0 - 0.0 /100 WBCs Final    RBC 3.02      4.00 - 5.20 x10*6/uL Final    Hemoglobin 8.7      12.0 - 16.0 g/dL Final    Hematocrit 29.4      36.0 - 46.0 % Final    MCV 97      80 - 100 fL Final    MCH 28.8      26.0 - 34.0 pg Final    MCHC 29.6      32.0 - 36.0 g/dL Final    RDW 18.2      11.5 - 14.5 % Final    Platelets 98      150 - 450 x10*3/uL Final    MPV 13.2      7.5 - 11.5 fL Final                  Basic Metabolic Panel        Component Value Flag Ref Range Units Status    Glucose 40      74 - 99 mg/dL Final    Sodium 143      136 - 145 mmol/L  Final    Potassium 4.9      3.5 - 5.3 mmol/L Final    Chloride 110      98 - 107 mmol/L Final    Bicarbonate 27      21 - 32 mmol/L Final    Anion Gap 11      10 - 20 mmol/L Final    Urea Nitrogen 54      6 - 23 mg/dL Final    Creatinine 1.84      0.50 - 1.05 mg/dL Final    eGFR 31      >60 mL/min/1.73m*2 Final    Comment:    Calculations of estimated GFR are performed using the 2021 CKD-EPI Study Refit equation without the race variable for the IDMS-Traceable creatinine methods.  https://jasn.asnjournals.org/content/early/2021/09/22/ASN.6090249301    Calcium 8.6      8.6 - 10.3 mg/dL Final                  Magnesium        Component Value Flag Ref Range Units Status    Magnesium 1.76      1.60 - 2.40 mg/dL Final                  POCT GLUCOSE        Component Value Flag Ref Range Units Status    POCT Glucose 79      74 - 99 mg/dL Final                  POCT GLUCOSE        Component Value Flag Ref Range Units Status    POCT Glucose 67      74 - 99 mg/dL Final                  POCT GLUCOSE        Component Value Flag Ref Range Units Status    POCT Glucose 67      74 - 99 mg/dL Final                  POCT GLUCOSE        Component Value Flag Ref Range Units Status    POCT Glucose 164      74 - 99 mg/dL Final                  POCT GLUCOSE        Component Value Flag Ref Range Units Status    POCT Glucose 54      74 - 99 mg/dL Final    Comment:    RN/MD NOTIFIED                  POCT GLUCOSE        Component Value Flag Ref Range Units Status    POCT Glucose 102      74 - 99 mg/dL Final                  XR toe left 2+ views          Interpreted By:  Jose A Anderson,   STUDY:  XR TOE LEFT 2+ VIEWS; ;  10/21/2023 12:28 pm      INDICATION:  Signs/Symptoms:4th digit wound, left foot.      COMPARISON:  None.      ACCESSION NUMBER(S):  [id number]      ORDERING CLINICIAN:  BURKE DIAL      FINDINGS:  Limited examination due to partially imaged distal 1st and 2nd toes.  Redemonstration extensive foot deformity with  hammertoe deformities  of the 3rd through 5th digits. Ghost tracks from prior postoperative  changes within the 2nd-4th metatarsals, and midfoot status post  subtalar and calcaneocuboid fusion. Stable marked deformity of the  ankle and hindfoot as well as increased sclerosis throughout the  ankle joint.. No acute fracture or dislocation. Moderate dorsal soft  tissue swelling. No obvious skin defect.  Dense atherosclerotic  calcifications.      IMPRESSION:  Limited examination due to incomplete visualization of the distal 1st  and 2nd toes. Prior postoperative changes as described above, with  marked deformity and osseous remodeling predominantly involving the  midfoot and ankle. Moderate dorsal soft tissue swelling without  obvious skin defect.          MACRO:  None      Signed by: Jose A Anderson 10/23/2023 8:34 AM  Dictation workstation:   [id number]         FENG without exercise                      [facility]  [address] [id number]      Tel [phone] Fax [phone]       Vascular Lab Report           PVR FENG    Patient Name:      LISBET GUTIERRES        Reading Physician:  [id number] Enzo Ashley MD, RPVI  Study Date:        10/21/2023            Ordering Provider:  [id number] BURKE DIAL  MRN/PID:           [id number]              Fellow:  Accession#:        [id number]          Technologist:       Ginna Travis RVT  Date of Birth/Age: 1960 / 62 years Technologist 2:  Gender:            F                     Encounter#:         [id number]  Admission Status:  Inpatient             Location Performed: [facility]       Diagnosis/ICD: Atherosclerosis of native arteries of left leg with ulceration of                 other part of foot-I70.245  Indication:    Atherosclerosis of native  arteries-extremities w/ulceration       Pertinent History: DM.       CONCLUSIONS:  Right Lower PVR: No evidence of arterial occlusive disease in the right lower extremity at rest. Normal digital perfusion noted. Triphasic flow is noted in the right common femoral artery, right posterior tibial artery and right dorsalis pedis artery. Ankle and digit tracings, waveforms and segmental pressures appear with in normal limits.  Left Lower PVR: No evidence of arterial occlusive disease in the left lower extremity at rest. Normal digital perfusion noted. Triphasic flow is noted in the left common femoral artery, left posterior tibial artery and left dorsalis pedis artery. Ankle and digit tracings, waveforms and segmental pressures appear with in normal limits.     Imaging & Doppler Findings:     RIGHT Lower PVR                Pressures Ratios  Right Posterior Tibial (Ankle) 141 mmHg  1.13  Right Dorsalis Pedis (Ankle)   138 mmHg  1.10  Right Digit (Great Toe)        133 mmHg  1.06          LEFT Lower PVR                Pressures Ratios  Left Posterior Tibial (Ankle) 136 mmHg  1.09  Left Dorsalis Pedis (Ankle)   142 mmHg  1.14  Left Digit (Great Toe)        171 mmHg  1.37                             Right     Left  Brachial Pressure 125 mmHg 125 mmHg          [id number] Enzo Ashley MD, RPVI  Electronically signed by [id number] Enzo Ashley MD, RPVI on 10/21/2023 at 4:35:21 PM         ** Final **       Linked Images                              POCT GLUCOSE        Component Value Flag Ref Range Units Status    POCT Glucose 144      74 - 99 mg/dL Final                  POCT GLUCOSE        Component Value Flag Ref Range Units Status    POCT Glucose 115      74 - 99 mg/dL Final                  POCT GLUCOSE        Component Value Flag Ref Range Units Status    POCT Glucose 90      74 - 99 mg/dL Final                  POCT GLUCOSE        Component Value Flag Ref Range Units Status    POCT Glucose 36      74 - 99 mg/dL Final     Comment:    RN/MD NOTIFIED                  POCT GLUCOSE        Component Value Flag Ref Range Units Status    POCT Glucose 162      74 - 99 mg/dL Final                  CBC        Component Value Flag Ref Range Units Status    WBC 4.8      4.4 - 11.3 x10*3/uL Final    nRBC 0.4      0.0 - 0.0 /100 WBCs Final    RBC 3.20      4.00 - 5.20 x10*6/uL Final    Hemoglobin 9.3      12.0 - 16.0 g/dL Final    Hematocrit 31.5      36.0 - 46.0 % Final    MCV 98      80 - 100 fL Final    MCH 29.1      26.0 - 34.0 pg Final    MCHC 29.5      32.0 - 36.0 g/dL Final    RDW 18.6      11.5 - 14.5 % Final    Platelets 104      150 - 450 x10*3/uL Final    MPV 11.8      7.5 - 11.5 fL Final                  Basic Metabolic Panel        Component Value Flag Ref Range Units Status    Glucose 78      74 - 99 mg/dL Final    Sodium 142      136 - 145 mmol/L Final    Potassium 4.3      3.5 - 5.3 mmol/L Final    Chloride 110      98 - 107 mmol/L Final    Bicarbonate 27      21 - 32 mmol/L Final    Anion Gap 9      10 - 20 mmol/L Final    Urea Nitrogen 44      6 - 23 mg/dL Final    Creatinine 1.63      0.50 - 1.05 mg/dL Final    eGFR 36      >60 mL/min/1.73m*2 Final    Comment:    Calculations of estimated GFR are performed using the 2021 CKD-EPI Study Refit equation without the race variable for the IDMS-Traceable creatinine methods.  https://jasn.asnjournals.org/content/early/2021/09/22/ASN.6062037391    Calcium 9.1      8.6 - 10.3 mg/dL Final                  POCT GLUCOSE        Component Value Flag Ref Range Units Status    POCT Glucose 122      74 - 99 mg/dL Final                  POCT GLUCOSE        Component Value Flag Ref Range Units Status    POCT Glucose 162      74 - 99 mg/dL Final                  CBC        Component Value Flag Ref Range Units Status    WBC 3.9      4.4 - 11.3 x10*3/uL Final    nRBC 0.0      0.0 - 0.0 /100 WBCs Final    RBC 3.01      4.00 - 5.20 x10*6/uL Final    Hemoglobin 8.9      12.0 - 16.0 g/dL Final     Hematocrit 30.2      36.0 - 46.0 % Final          80 - 100 fL Final    MCH 29.6      26.0 - 34.0 pg Final    MCHC 29.5      32.0 - 36.0 g/dL Final    RDW 18.4      11.5 - 14.5 % Final    Platelets 104      150 - 450 x10*3/uL Final    MPV 13.5      7.5 - 11.5 fL Final                  Magnesium        Component Value Flag Ref Range Units Status    Magnesium 1.73      1.60 - 2.40 mg/dL Final                  POCT GLUCOSE        Component Value Flag Ref Range Units Status    POCT Glucose 125      74 - 99 mg/dL Final                  POCT GLUCOSE        Component Value Flag Ref Range Units Status    POCT Glucose 42      74 - 99 mg/dL Final                  Basic Metabolic Panel        Component Value Flag Ref Range Units Status    Glucose 48      74 - 99 mg/dL Final    Sodium 143      136 - 145 mmol/L Final    Potassium 4.8      3.5 - 5.3 mmol/L Final    Chloride 114      98 - 107 mmol/L Final    Bicarbonate 23      21 - 32 mmol/L Final    Anion Gap 11      10 - 20 mmol/L Final    Urea Nitrogen 46      6 - 23 mg/dL Final    Creatinine 1.70      0.50 - 1.05 mg/dL Final    eGFR 34      >60 mL/min/1.73m*2 Final    Comment:    Calculations of estimated GFR are performed using the 2021 CKD-EPI Study Refit equation without the race variable for the IDMS-Traceable creatinine methods.  https://jasn.asnjournals.org/content/early/2021/09/22/ASN.4108854799    Calcium 8.6      8.6 - 10.3 mg/dL Final                  C-Peptide        Component Value Flag Ref Range Units Status    C-Peptide 3.0      0.7 - 3.9 ng/mL Final                  Beta Hydroxybutyrate        Component Value Flag Ref Range Units Status    Beta-Hydroxybutyrate 0.07      0.02 - 0.27 mmol/L Final                  POCT GLUCOSE        Component Value Flag Ref Range Units Status    POCT Glucose 62      74 - 99 mg/dL Final                  POCT GLUCOSE        Component Value Flag Ref Range Units Status    POCT Glucose 87      74 - 99 mg/dL Final                   POCT GLUCOSE        Component Value Flag Ref Range Units Status    POCT Glucose 96      74 - 99 mg/dL Final                  CBC        Component Value Flag Ref Range Units Status    WBC 4.0      4.4 - 11.3 x10*3/uL Final    nRBC 0.5      0.0 - 0.0 /100 WBCs Final    RBC 2.87      4.00 - 5.20 x10*6/uL Final    Hemoglobin 8.4      12.0 - 16.0 g/dL Final    Hematocrit 28.8      36.0 - 46.0 % Final          80 - 100 fL Final    MCH 29.3      26.0 - 34.0 pg Final    MCHC 29.2      32.0 - 36.0 g/dL Final    RDW 18.4      11.5 - 14.5 % Final    Platelets 91      150 - 450 x10*3/uL Final    MPV 12.0      7.5 - 11.5 fL Final                  Basic Metabolic Panel        Component Value Flag Ref Range Units Status    Glucose 82      74 - 99 mg/dL Final    Sodium 142      136 - 145 mmol/L Final    Potassium 4.9      3.5 - 5.3 mmol/L Final    Chloride 115      98 - 107 mmol/L Final    Bicarbonate 21      21 - 32 mmol/L Final    Anion Gap 11      10 - 20 mmol/L Final    Urea Nitrogen 42      6 - 23 mg/dL Final    Creatinine 1.48      0.50 - 1.05 mg/dL Final    eGFR 40      >60 mL/min/1.73m*2 Final    Comment:    Calculations of estimated GFR are performed using the 2021 CKD-EPI Study Refit equation without the race variable for the IDMS-Traceable creatinine methods.  https://jasn.asnjournals.org/content/early/2021/09/22/ASN.1234044145    Calcium 8.0      8.6 - 10.3 mg/dL Final                  Magnesium        Component Value Flag Ref Range Units Status    Magnesium 1.71      1.60 - 2.40 mg/dL Final                  POCT GLUCOSE        Component Value Flag Ref Range Units Status    POCT Glucose 129      74 - 99 mg/dL Final                  POCT GLUCOSE        Component Value Flag Ref Range Units Status    POCT Glucose 108      74 - 99 mg/dL Final                  POCT GLUCOSE        Component Value Flag Ref Range Units Status    POCT Glucose 50      74 - 99 mg/dL Final    Comment:    RN/MD NOTIFIED                   POCT GLUCOSE        Component Value Flag Ref Range Units Status    POCT Glucose 88      74 - 99 mg/dL Final                  POCT GLUCOSE        Component Value Flag Ref Range Units Status    POCT Glucose 167      74 - 99 mg/dL Final                  CBC        Component Value Flag Ref Range Units Status    WBC 4.8      4.4 - 11.3 x10*3/uL Final    nRBC 0.8      0.0 - 0.0 /100 WBCs Final    RBC 2.69      4.00 - 5.20 x10*6/uL Final    Hemoglobin 7.8      12.0 - 16.0 g/dL Final    Hematocrit 26.4      36.0 - 46.0 % Final    MCV 98      80 - 100 fL Final    MCH 29.0      26.0 - 34.0 pg Final    MCHC 29.5      32.0 - 36.0 g/dL Final    RDW 18.6      11.5 - 14.5 % Final    Platelets 90      150 - 450 x10*3/uL Final    MPV 12.0      7.5 - 11.5 fL Final                  Basic Metabolic Panel        Component Value Flag Ref Range Units Status    Glucose 75      74 - 99 mg/dL Final    Sodium 143      136 - 145 mmol/L Final    Potassium 5.2      3.5 - 5.3 mmol/L Final    Chloride 114      98 - 107 mmol/L Final    Bicarbonate 23      21 - 32 mmol/L Final    Anion Gap 11      10 - 20 mmol/L Final    Urea Nitrogen 45      6 - 23 mg/dL Final    Creatinine 1.62      0.50 - 1.05 mg/dL Final    eGFR 36      >60 mL/min/1.73m*2 Final    Comment:    Calculations of estimated GFR are performed using the 2021 CKD-EPI Study Refit equation without the race variable for the IDMS-Traceable creatinine methods.  https://jasn.asnjournals.org/content/early/2021/09/22/ASN.5431221122    Calcium 8.3      8.6 - 10.3 mg/dL Final                  Magnesium        Component Value Flag Ref Range Units Status    Magnesium 1.76      1.60 - 2.40 mg/dL Final                  POCT GLUCOSE        Component Value Flag Ref Range Units Status    POCT Glucose 180      74 - 99 mg/dL Final                  POCT GLUCOSE        Component Value Flag Ref Range Units Status    POCT Glucose 25      74 - 99 mg/dL Final                  POCT GLUCOSE        Component  Value Flag Ref Range Units Status    POCT Glucose 150      74 - 99 mg/dL Final                  POCT GLUCOSE        Component Value Flag Ref Range Units Status    POCT Glucose 64      74 - 99 mg/dL Final                        Expand All Collapse All    Consults   Reason For Consult  Left 4th digit wound     History Of Present Illness  Bing Holliday is a 62 y.o. female who presented to [facility] ED and was admitted on 10/20/23 for lumbar stenosis with neurogentic claudication.  Podiatry consulted for left 4th digit wound.  Patient states that the wound started as a callus, turned to a blister, and an open wound.  Patient states that she does not have an established podiatrist at this time.  Patient states that the wound is painful, describes the pain as achy, and rates the pain 4 out of 10.  Patient states that the pain is well controlled with Tylenol.  Patient states that she has just been applying a Band-Aid and antibiotic ointment.  Patient denies any current constitutional symptoms, and has no other complaints at this time.     Past Medical History  She has a past medical history of Acute upper respiratory infection, unspecified (03/04/2020), Acute upper respiratory infection, unspecified (09/30/2015), Arthritis, Body mass index (BMI) 23.0-23.9, adult (10/15/2021), Body mass index (BMI) 33.0-33.9, adult (03/04/2020), Cardiomegaly (08/27/2013), Chronic kidney disease, stage 3 unspecified (CMS/HCC) (07/02/2013), Disease of pericardium, unspecified (07/02/2013), Encounter for follow-up examination after completed treatment for conditions other than malignant neoplasm (10/06/2022), Generalized contraction of visual field, right eye (01/29/2015), Homonymous bilateral field defects, right side (04/29/2016), Hypertensive chronic kidney disease with stage 1 through stage 4 chronic kidney disease, or unspecified chronic kidney disease (07/02/2013), Laceration without foreign body, left foot, initial encounter  (07/03/2018), Migraine with aura, not intractable, without status migrainosus (10/24/2022), Other conditions influencing health status (07/02/2013), Other conditions influencing health status (07/02/2013), Other conditions influencing health status (07/02/2013), Other conditions influencing health status (05/22/2015), Other conditions influencing health status (10/24/2022), Other conditions influencing health status (03/14/2022), Other long term (current) drug therapy (10/24/2022), Personal history of diseases of the blood and blood-forming organs and certain disorders involving the immune mechanism (07/02/2013), Personal history of diseases of the skin and subcutaneous tissue (08/11/2015), Personal history of nephrotic syndrome (07/02/2013), Personal history of other diseases of the circulatory system (08/27/2013), Personal history of other diseases of the nervous system and sense organs, Personal history of other diseases of the respiratory system, Personal history of other infectious and parasitic diseases (07/02/2013), Personal history of other specified conditions, Personal history of other specified conditions (08/27/2013), Puckering of macula, right eye (10/24/2022), Raynaud's syndrome without gangrene (07/02/2013), Systemic lupus erythematosus, unspecified (CMS/[facility]) (07/24/2015), Systemic lupus erythematosus, unspecified (CMS/[facility]) (07/24/2015), Systemic lupus erythematosus, unspecified (CMS/[facility]) (07/24/2015), Type 2 diabetes mellitus with diabetic nephropathy (CMS/[facility]) (07/02/2013), Type 2 diabetes mellitus with mild nonproliferative diabetic retinopathy without macular edema, left eye (CMS/[facility]) (07/27/2015), Type 2 diabetes mellitus with mild nonproliferative diabetic retinopathy without macular edema, unspecified eye (CMS/[facility]) (07/24/2015), Type 2 diabetes mellitus with proliferative diabetic retinopathy without macular edema, right eye (CMS/[facility]) (07/27/2015), Type 2 diabetes mellitus with proliferative  diabetic retinopathy without macular edema, unspecified eye (CMS/[facility]) (07/24/2015), Unspecified acute and subacute iridocyclitis (07/24/2015), and Unspecified open wound, left foot, sequela (07/03/2018).     Surgical History  She has a past surgical history that includes Eye surgery (03/06/2015); Total hip arthroplasty (01/29/2015); Cholecystectomy (01/29/2015); Other surgical history (01/29/2015); Other surgical history (01/29/2015); Ankle surgery (01/29/2015); Foot surgery (01/29/2015); MR angio head wo IV contrast (7/26/2013); MR angio neck wo IV contrast (7/26/2013); CT guided percutaneous biopsy bone deep (5/4/2021); MR angio head wo IV contrast (9/17/2021); MR angio neck wo IV contrast (9/17/2021); MR angio neck wo IV contrast (3/25/2023); and MR angio head wo IV contrast (3/25/2023).     Social History  She reports that she has never smoked. She has never used smokeless tobacco. She reports that she does not drink alcohol and does not use drugs.     Family History  Family History   No family history on file.        Allergies  Ace inhibitors, Hydroxychloroquine, Lisinopril, Penicillins, and Sulfa (sulfonamide antibiotics)     Review of Systems  Review of Systems      Physical Exam  Vascular: DP and PT pulses palpable 1/4 bilaterally.  CFT under 5 seconds when tested at distal digits.  Skin temp warm to warm from proximal to distal.  +2 pitting edema noted to BLE.     Neuro: Protective sensation diminished, light tough sensation intact.  No Tinel's or Valleix's signs elicited.       Derm:  Full thickness non pressure ulcer noted to dorsal 4th digit PIPJ.  No probe to bone or exposed tendon noted at this time.  Wound base is nearly 100% fibrotic with mild serosanguineous drainage no purulence, erythema, proximal streaking, or other SOI noted at this time.  Hyperkeratotic tissue noted to lateral aspect of left plantar foot at fifth metatarsal styloid process, no open lesion upon debridement.  Hyperkeratotic  "tissue noted to subsecond metatarsal head right foot, no open lesion upon debridement.  No subcutaneous nodules.  Interdigital spaces are CDI.     Musc: Rigid cavus deformity noted to left foot, with nonreducible hammertoe deformities noted to digits 2 through 5 bilaterally.  No other gross deformities noted.  No POP noted to any other area of the foot/ankle.     Medications  Scheduled medications  acetaminophen, 975 mg, oral, q8h  amLODIPine, 2.5 mg, oral, Daily  apixaban, 2.5 mg, oral, BID  atorvastatin, 40 mg, oral, Nightly  budesonide, 0.5 mg, nebulization, BID  cefTRIAXone, 1 g, intravenous, q24h  fludrocortisone, 0.1 mg, oral, Every other day  folic acid, 1 mg, oral, Daily  insulin lispro, 0-10 Units, subcutaneous, Before meals & nightly  ipratropium-albuteroL, 3 mL, nebulization, TID  lidocaine, 1 patch, transdermal, Daily  melatonin, 5 mg, oral, Nightly  multivitamin with minerals, 1 tablet, oral, Daily  mycophenolate, 500 mg, oral, BID  pantoprazole, 40 mg, oral, Daily before breakfast   Or  pantoprazole, 40 mg, intravenous, Daily before breakfast  predniSONE, 5 mg, oral, Daily  risperiDONE, 0.5 mg, oral, Nightly  [Held by provider] torsemide, 10 mg, oral, Daily        Continuous medications  sodium chloride 0.9%, 100 mL/hr, Last Rate: 100 mL/hr (10/21/23 0907)        PRN medications  PRN medications: acetaminophen **OR** acetaminophen **OR** acetaminophen, acetaminophen **OR** acetaminophen **OR** acetaminophen, dextrose, glucagon, loperamide, melatonin, metoclopramide **OR** metoclopramide, ondansetron ODT **OR** ondansetron, oxyCODONE, polyethylene glycol      Last Recorded Vitals  Blood pressure 126/64, pulse 61, temperature 35.2 °C (95.4 °F), temperature source Temporal, resp. rate 18, height 1.803 m (5' 11\"), weight 94.6 kg (208 lb 8.9 oz), SpO2 94 %.     Relevant Results        Results for orders placed or performed during the hospital encounter of 10/19/23 (from the past 24 hour(s))   POCT GLUCOSE "   Result Value Ref Range     POCT Glucose 46 (L) 74 - 99 mg/dL   POCT GLUCOSE   Result Value Ref Range     POCT Glucose 130 (H) 74 - 99 mg/dL   POCT GLUCOSE   Result Value Ref Range     POCT Glucose 74 74 - 99 mg/dL   POCT GLUCOSE   Result Value Ref Range     POCT Glucose 79 74 - 99 mg/dL   ECG 12 lead   Result Value Ref Range     Ventricular Rate 57 BPM     Atrial Rate 57 BPM     WY Interval 184 ms     QRS Duration 100 ms     QT Interval 454 ms     QTC Calculation(Bazett) 441 ms     P Axis 56 degrees     R Axis -19 degrees     T Axis 21 degrees     QRS Count 10 beats     Q Onset 217 ms     P Onset 125 ms     P Offset 185 ms     T Offset 444 ms     QTC Fredericia 446 ms   POCT GLUCOSE   Result Value Ref Range     POCT Glucose 67 (L) 74 - 99 mg/dL   POCT GLUCOSE   Result Value Ref Range     POCT Glucose 67 (L) 74 - 99 mg/dL   POCT GLUCOSE   Result Value Ref Range     POCT Glucose 164 (H) 74 - 99 mg/dL   CBC   Result Value Ref Range     WBC 3.9 (L) 4.4 - 11.3 x10*3/uL     nRBC 0.0 0.0 - 0.0 /100 WBCs     RBC 3.02 (L) 4.00 - 5.20 x10*6/uL     Hemoglobin 8.7 (L) 12.0 - 16.0 g/dL     Hematocrit 29.4 (L) 36.0 - 46.0 %     MCV 97 80 - 100 fL     MCH 28.8 26.0 - 34.0 pg     MCHC 29.6 (L) 32.0 - 36.0 g/dL     RDW 18.2 (H) 11.5 - 14.5 %     Platelets 98 (L) 150 - 450 x10*3/uL     MPV 13.2 (H) 7.5 - 11.5 fL   Basic Metabolic Panel   Result Value Ref Range     Glucose 40 (LL) 74 - 99 mg/dL     Sodium 143 136 - 145 mmol/L     Potassium 4.9 3.5 - 5.3 mmol/L     Chloride 110 (H) 98 - 107 mmol/L     Bicarbonate 27 21 - 32 mmol/L     Anion Gap 11 10 - 20 mmol/L     Urea Nitrogen 54 (H) 6 - 23 mg/dL     Creatinine 1.84 (H) 0.50 - 1.05 mg/dL     eGFR 31 (L) >60 mL/min/1.73m*2     Calcium 8.6 8.6 - 10.3 mg/dL   Magnesium   Result Value Ref Range     Magnesium 1.76 1.60 - 2.40 mg/dL   POCT GLUCOSE   Result Value Ref Range     POCT Glucose 54 (L) 74 - 99 mg/dL   POCT GLUCOSE   Result Value Ref Range     POCT Glucose 102 (H) 74 - 99 mg/dL       ECG 12 lead     Result Date: 10/20/2023  Sinus bradycardia Voltage criteria for left ventricular hypertrophy Abnormal ECG When compared with ECG of 30-AUG-2023 09:00, Sinus rhythm has replaced Atrial fibrillation Vent. rate has decreased BY  37 BPM QRS duration has increased Criteria for Septal infarct are no longer Present Criteria for Inferior infarct are no longer Present ST elevation has replaced ST depression in Lateral leads Confirmed by Caterina Dixon ([id number]) on 10/20/2023 11:20:43 PM     MR thoracic spine wo IV contrast     Result Date: 10/20/2023  Interpreted By:  Juan A Betancourt, STUDY: MR THORACIC SPINE WO IV CONTRAST; ;  10/20/2023 3:19 pm   INDICATION: Signs/Symptoms:t7 compression fracture, bilateral lower extremity weakness, intermittent urinary incontinence.   COMPARISON: CT thoracic spine from 10/19/2023. MRI thoracic spine from 05/05/2021.   ACCESSION NUMBER(S): [id number]   ORDERING CLINICIAN: HARJEET SO   TECHNIQUE: MRI of the thoracic spine was performed with acquisition of sagittal T2, sagittal T1, sagittal STIR, axial T1, and axial T2 weighted sequences.   FINDINGS: There is mild S shaped curvature of the thoracic spine with dextrocurvature superiorly and levocurvature inferiorly. There is stable grade 1 anterolisthesis at T3-T4 and mild retrolisthesis at T11-T12. Otherwise, there is no striking spondylolisthesis at any level within the thoracic spine.   There is 40-45% height loss of the T7 vertebral body, unchanged since the MRI from 05/05/2021. There is no retropulsion into the spinal canal. Remaining thoracic vertebral body heights are maintained. There is no edematous signal located within the visualized vertebral bodies.   There is mild intervertebral disc height narrowing at few levels with chronic degenerative endplate changes, similar to the prior MRI.   There are disc bulges/protrusions at multiple levels within the thoracic spine which cause variable degree of ventral thecal  sac effacement but no spinal canal stenosis. Disc bulge/protrusion with osteophytic spurring is most pronounced at the level of T11-T12, unchanged. There is variable degree of neural foraminal narrowing at multiple levels due to degenerative changes including moderate bilateral neural foraminal narrowing at T7-T8 and mild right neural foraminal narrowing at T8-T9. There is stable marked bilateral neural foraminal narrowing at T9-T10 and marked right neural foraminal narrowing at T10-T11. There is stable marked bilateral neural foraminal narrowing at T11-T12.   There is facet osteoarthropathy at multiple levels.   The thoracic spinal cord is normal in signal and caliber.        1. Stable moderate compression fracture of the T7 vertebral body compared to the MRI from 05/05/2021. There is no retropulsion into the spinal canal. Remaining vertebral body heights are maintained.   2. Multilevel degenerative changes of the thoracic spine are unchanged since the prior MRI.   This study was interpreted at [facility].     MACRO: None   Signed by: Juan A Betancourt 10/20/2023 3:31 PM Dictation workstation:   [id number]     MR lumbar spine wo IV contrast     Result Date: 10/19/2023  Interpreted By:  Lo Judge, STUDY: MR LUMBAR SPINE WO IV CONTRAST;  10/19/2023 9:16 pm   INDICATION: Signs/Symptoms:incontinence new onset.   COMPARISON: CT lumbar spine 10/19/2023, MRI 05/05/2021   ACCESSION NUMBER(S): [id number]   ORDERING CLINICIAN: JADA BURRELL   TECHNIQUE: Sagittal T1, T2, STIR, axial T1 and T2 weighted images of the lumbar spine were acquired.   FINDINGS: Alignment: Reversal of the cervical curvature and dextroscoliosis centered about L3, similar to prior imaging. Minimal retrolisthesis of L3 on L4. Minimal retrolisthesis of L4 on L5.   Vertebrae/Intervertebral Discs: Multilevel chronic appearing vertebral body height loss most prominent at L2, L3 and L4 similar to prior imaging.Mild  chronic endplate bone marrow signal changes. No significant bone marrow edema identified. Probable osseous demineralization. Multilevel intervertebral disc space narrowing and disc desiccation without significant intervertebral disc space edema.   Conus: The lower thoracic cord appears unremarkable. The conus terminates at L1.   T12-L1: Disc osteophyte complex and posterior ligamentous hypertrophy with facet arthropathy resulting in mild-to-moderate canal narrowing. Mild foraminal narrowing.   L1-2: Mild disc bulge and facet hypertrophy with moderate canal narrowing and moderate left lateral recess stenosis. Mild right and moderate left foraminal narrowing.   L2-3: Disc osteophyte complex and facet hypertrophy with moderate canal and moderate to severe lateral recess stenosis. Mild to moderate foraminal stenosis.   L3-4: Central to left paracentral disc osteophyte complex with retrolisthesis, ligamentous hypertrophy and facet arthropathy resulting in severe canal stenosis, similar are mildly progressed from prior imaging. Moderate right and severe left foraminal stenosis.   L4-5: Disc bulge and posterior ligamentous hypertrophy with facet arthropathy resulting in at least moderate canal narrowing. Mild to moderate left and severe right foraminal stenosis.   L5-S1: Disc bulge and facet hypertrophy with mild canal narrowing and moderate left lateral recess stenosis. Moderate right and mild left foraminal narrowing.   The prevertebral and posterior paraspinous soft tissues are unremarkable. A probable left renal cyst.        Extensive degenerative changes with reversal of the lumbar lordosis and severe L3-L4 canal stenosis, similar to slightly progressed from 05/05/2021. Additional findings of foraminal and lateral recess narrowing as described.   No evidence of acute fracture or traumatic subluxation.   MACRO: None   Signed by: Lo Judge 10/19/2023 10:00 PM Dictation workstation:   [id number]     CT cervical  spine wo IV contrast     Result Date: 10/19/2023  Interpreted By:  Mariia Concepcion, STUDY: CT CERVICAL SPINE WO IV CONTRAST; CT LUMBAR SPINE WO IV CONTRAST; CT THORACIC SPINE WO IV CONTRAST;  10/19/2023 5:07 pm   INDICATION: Signs/Symptoms:per neuro request; Signs/Symptoms:per neuro request for lower extremity weakness.   COMPARISON: None.   ACCESSION NUMBER(S): [id number]; [id number]; [id number]   ORDERING CLINICIAN: JADA BURRELL   TECHNIQUE: Axial CT images of the cervical spine are obtained. Axial, coronal and sagittal reconstructions are provided for review. Axial CT images of the thoracic spine are obtained. Axial, coronal and sagittal reconstructions are provided for review.Axial CT images of the lumbar spine are obtained. Axial, coronal and sagittal reconstructions are provided for review.   FINDINGS: Cervical spine: No acute fracture or subluxation. Reversal of normal cervical lordosis in the midcervical spine. Mild-moderate disc height loss seen at C3-C4, C4-C5, C5-C6, and C6-C7 with multilevel endplate erosions, greatest at C5-C6 and C6-C7. Multilevel marginal osteophytes C3-C7. Multilevel facet arthropathy in the cervical spine. Moderate right-sided neural foraminal narrowing at C3-C4, severe on the left at C4-C5, mild on the left at C5-C6, and moderate bilaterally at C6-C7. No prevertebral hematoma.   Thoracic spine: Moderate compression deformity in T7 vertebral body, age indeterminate. Levocurvature in the lower thoracic spine. Scattered facet arthropathy. Multilevel vacuum phenomenon T9-T10, T10-T11, and T11-T12. Subcentimeter bone island in T1 vertebral body. There is dense material in the urinary system bilaterally suggestive of excreted contrast. Heavy coronary artery calcifications. Streaky opacities in the lungs dependently.   Lumbar spine: Multilevel advanced disc height loss L2-L3, L3-L4, and L4-L5 with endplate erosions and marginal osteophyte formation. Levoscoliosis of the lumbar  spine. Facet arthropathy in the lumbar spine, greatest inferiorly. Multilevel spinal canal stenosis in the lower lumbar spine, at L2-L3 measuring 6 mm, at L3-L4 measuring 5 mm, and at L4-L5 measuring 4 mm. Bilateral neural foraminal narrowing is also seen at these levels. Partially visualized right hip arthroplasty with associated streak artifact. Sigmoid colonic diverticulosis. Atherosclerosis.        No acute osseous abnormality in the cervical spine. Multilevel degenerative change and neural foraminal narrowing.   Moderate age-indeterminate compression fracture T7 vertebral body.   No acute osseous abnormality in the lumbar spine. Multilevel severe canal stenosis as well as neural foraminal narrowing.   Signed by: Mariia Concepcion 10/19/2023 6:06 PM Dictation workstation:   [id number]     CT thoracic spine wo IV contrast     Result Date: 10/19/2023  Interpreted By:  Mariia Concepcion, STUDY: CT CERVICAL SPINE WO IV CONTRAST; CT LUMBAR SPINE WO IV CONTRAST; CT THORACIC SPINE WO IV CONTRAST;  10/19/2023 5:07 pm   INDICATION: Signs/Symptoms:per neuro request; Signs/Symptoms:per neuro request for lower extremity weakness.   COMPARISON: None.   ACCESSION NUMBER(S): [id number]; [id number]; [id number]   ORDERING CLINICIAN: JADA BURRELL   TECHNIQUE: Axial CT images of the cervical spine are obtained. Axial, coronal and sagittal reconstructions are provided for review. Axial CT images of the thoracic spine are obtained. Axial, coronal and sagittal reconstructions are provided for review.Axial CT images of the lumbar spine are obtained. Axial, coronal and sagittal reconstructions are provided for review.   FINDINGS: Cervical spine: No acute fracture or subluxation. Reversal of normal cervical lordosis in the midcervical spine. Mild-moderate disc height loss seen at C3-C4, C4-C5, C5-C6, and C6-C7 with multilevel endplate erosions, greatest at C5-C6 and C6-C7. Multilevel marginal osteophytes C3-C7. Multilevel  facet arthropathy in the cervical spine. Moderate right-sided neural foraminal narrowing at C3-C4, severe on the left at C4-C5, mild on the left at C5-C6, and moderate bilaterally at C6-C7. No prevertebral hematoma.   Thoracic spine: Moderate compression deformity in T7 vertebral body, age indeterminate. Levocurvature in the lower thoracic spine. Scattered facet arthropathy. Multilevel vacuum phenomenon T9-T10, T10-T11, and T11-T12. Subcentimeter bone island in T1 vertebral body. There is dense material in the urinary system bilaterally suggestive of excreted contrast. Heavy coronary artery calcifications. Streaky opacities in the lungs dependently.   Lumbar spine: Multilevel advanced disc height loss L2-L3, L3-L4, and L4-L5 with endplate erosions and marginal osteophyte formation. Levoscoliosis of the lumbar spine. Facet arthropathy in the lumbar spine, greatest inferiorly. Multilevel spinal canal stenosis in the lower lumbar spine, at L2-L3 measuring 6 mm, at L3-L4 measuring 5 mm, and at L4-L5 measuring 4 mm. Bilateral neural foraminal narrowing is also seen at these levels. Partially visualized right hip arthroplasty with associated streak artifact. Sigmoid colonic diverticulosis. Atherosclerosis.        No acute osseous abnormality in the cervical spine. Multilevel degenerative change and neural foraminal narrowing.   Moderate age-indeterminate compression fracture T7 vertebral body.   No acute osseous abnormality in the lumbar spine. Multilevel severe canal stenosis as well as neural foraminal narrowing.   Signed by: Mariia Concepcion 10/19/2023 6:06 PM Dictation workstation:   [id number]     CT lumbar spine wo IV contrast     Result Date: 10/19/2023  Interpreted By:  Mariia Concepcion, STUDY: CT CERVICAL SPINE WO IV CONTRAST; CT LUMBAR SPINE WO IV CONTRAST; CT THORACIC SPINE WO IV CONTRAST;  10/19/2023 5:07 pm   INDICATION: Signs/Symptoms:per neuro request; Signs/Symptoms:per neuro request for lower extremity  weakness.   COMPARISON: None.   ACCESSION NUMBER(S): [id number]; [id number]; [id number]   ORDERING CLINICIAN: JADA BURRELL   TECHNIQUE: Axial CT images of the cervical spine are obtained. Axial, coronal and sagittal reconstructions are provided for review. Axial CT images of the thoracic spine are obtained. Axial, coronal and sagittal reconstructions are provided for review.Axial CT images of the lumbar spine are obtained. Axial, coronal and sagittal reconstructions are provided for review.   FINDINGS: Cervical spine: No acute fracture or subluxation. Reversal of normal cervical lordosis in the midcervical spine. Mild-moderate disc height loss seen at C3-C4, C4-C5, C5-C6, and C6-C7 with multilevel endplate erosions, greatest at C5-C6 and C6-C7. Multilevel marginal osteophytes C3-C7. Multilevel facet arthropathy in the cervical spine. Moderate right-sided neural foraminal narrowing at C3-C4, severe on the left at C4-C5, mild on the left at C5-C6, and moderate bilaterally at C6-C7. No prevertebral hematoma.   Thoracic spine: Moderate compression deformity in T7 vertebral body, age indeterminate. Levocurvature in the lower thoracic spine. Scattered facet arthropathy. Multilevel vacuum phenomenon T9-T10, T10-T11, and T11-T12. Subcentimeter bone island in T1 vertebral body. There is dense material in the urinary system bilaterally suggestive of excreted contrast. Heavy coronary artery calcifications. Streaky opacities in the lungs dependently.   Lumbar spine: Multilevel advanced disc height loss L2-L3, L3-L4, and L4-L5 with endplate erosions and marginal osteophyte formation. Levoscoliosis of the lumbar spine. Facet arthropathy in the lumbar spine, greatest inferiorly. Multilevel spinal canal stenosis in the lower lumbar spine, at L2-L3 measuring 6 mm, at L3-L4 measuring 5 mm, and at L4-L5 measuring 4 mm. Bilateral neural foraminal narrowing is also seen at these levels. Partially visualized right hip  arthroplasty with associated streak artifact. Sigmoid colonic diverticulosis. Atherosclerosis.        No acute osseous abnormality in the cervical spine. Multilevel degenerative change and neural foraminal narrowing.   Moderate age-indeterminate compression fracture T7 vertebral body.   No acute osseous abnormality in the lumbar spine. Multilevel severe canal stenosis as well as neural foraminal narrowing.   Signed by: Mariia Concepcion 10/19/2023 6:06 PM Dictation workstation:   [id number]     XR chest 1 view     Result Date: 10/19/2023  Interpreted By:  Nic Moseley, STUDY: XR CHEST 1 VIEW  10/19/2023 3:11 pm   INDICATION: Signs/Symptoms:bat weakness   COMPARISON: 08/27/2023   ACCESSION NUMBER(S): [id number]   ORDERING CLINICIAN: JADA BURRELL   TECHNIQUE: A single AP portable radiograph of the chest was obtained.   FINDINGS: Multiple cardiac monitoring leads are seen over the chest.   No focal infiltrate, pleural effusion or pneumothorax is identified. The cardiac silhouette is prominent, similar to prior studies.        No focal infiltrate or pneumothorax is identified.   MACRO: None.   Signed by: Nic Moseley 10/19/2023 3:13 PM Dictation workstation:   [id number]     CT angio brain attack head w and wo IV contrast and post procedure     Result Date: 10/19/2023  Interpreted By:  Jairo Moreno, STUDY: CT ANGIO BRAIN ATTACK HEAD W AND WO IV CONTRAST AND POST PROCEDURE; CT ANGIO BRAIN ATTACK NECK W IV CONTRAST AND POST PROCEDURE; 10/19/2023 2:57 pm   INDICATION: Signs/Symptoms:hemorrhagic stroke alert; Signs/Symptoms:bat.   COMPARISON: Head CT, same day   ACCESSION NUMBER(S): [id number]; [id number]   ORDERING CLINICIAN: JADA BURRELL   TECHNIQUE: 50 mL Omnipaque 350 was administered intravenously and axial images of the head and neck were acquired. Coronal and sagittal MIPs and 3-D reconstructions were created on an independent workstation and provided for review. Image quality is significantly  degraded by poor bolus timing.   FINDINGS: CTA Head:   There is minimal arterial phase enhancement, no proximal large vessel occlusion in the [id number].   CTA Neck:   There is minimal arterial phase enhancement, no high-grade narrowing or occlusion of the major cervical arteries.   The soft tissues of the neck are unremarkable. The visualized lungs are clear. Dental caries with associated periapical lucency in the left maxilla. Degenerative changes in the spine and partially visualized glenohumeral joints.        Limited exam due to poor bolus timing, no large vessel occlusion in the head or neck.   MACRO: None   Signed by: Jairo Moreno 10/19/2023 3:08 PM Dictation workstation:   [id number]     CT angio brain attack neck w IV contrast and post procedure     Result Date: 10/19/2023  Interpreted By:  Jairo Moreno, STUDY: CT ANGIO BRAIN ATTACK HEAD W AND WO IV CONTRAST AND POST PROCEDURE; CT ANGIO BRAIN ATTACK NECK W IV CONTRAST AND POST PROCEDURE; 10/19/2023 2:57 pm   INDICATION: Signs/Symptoms:hemorrhagic stroke alert; Signs/Symptoms:bat.   COMPARISON: Head CT, same day   ACCESSION NUMBER(S): [id number]; [id number]   ORDERING CLINICIAN: JADA BURRELL   TECHNIQUE: 50 mL Omnipaque 350 was administered intravenously and axial images of the head and neck were acquired. Coronal and sagittal MIPs and 3-D reconstructions were created on an independent workstation and provided for review. Image quality is significantly degraded by poor bolus timing.   FINDINGS: CTA Head:   There is minimal arterial phase enhancement, no proximal large vessel occlusion in the [id number]-[id number]-Kgrpfz.   CTA Neck:   There is minimal arterial phase enhancement, no high-grade narrowing or occlusion of the major cervical arteries.   The soft tissues of the neck are unremarkable. The visualized lungs are clear. Dental caries with associated periapical lucency in the left maxilla. Degenerative changes in the spine and partially  visualized glenohumeral joints.        Limited exam due to poor bolus timing, no large vessel occlusion in the head or neck.   MACRO: None   Signed by: Jairo Moreno 10/19/2023 3:08 PM Dictation workstation:   [id number]     CT brain attack head wo IV contrast     Result Date: 10/19/2023  Interpreted By:  Gaston Beyer, STUDY: CT BRAIN ATTACK HEAD WO IV CONTRAST;  10/19/2023 2:38 pm   INDICATION: Signs/Symptoms:Stroke Evaluation.   COMPARISON: 08/28/2023   ACCESSION NUMBER(S): [id number]   ORDERING CLINICIAN: JADA BURRELL   TECHNIQUE: Sequential trans axial images were obtained  .   FINDINGS: INTRACRANIAL:   CORTICAL SULCI AND EXTRA-AXIAL SPACES:  Unremarkable.   VENTRICULAR SYSTEM:  Unremarkable without significant dilatation.   CEREBRAL PARENCHYMA:  There is mild hypodensity at the long tracks of the white matter, particularly in the frontoparietal regions greater on the left similar to the prior exam most likely due to gliosis from arteriolar disease.There is also a small area of encephalomalacia/gliosis at the left occipital lobe most likely from a remote infarction, also similar to the prior exam. Otherwisethere is no evidence of definite subacute infarction, intracranial hemorrhage or mass.   EXTRACRANIAL: Visualized paranasal sinuses and mastoids are clear. The calvarium is intact.        Mild age related degenerative change as described without acute findings or significant change from the prior exam. No intracranial hemorrhage.   MACRO: Gaston Beyer discussed the significance and urgency of this critical finding by secure chat with  JADA BURRELL on 10/19/2023 at 2:42 pm.  (**-RCF-**) Findings:  See findings.     Signed by: Gaston Beyer 10/19/2023 2:44 PM Dictation workstation:   [id number]           Assessment/Plan   Assessment:  - Full thickness non pressure ulcer 4th digit, left foot  - DM-II complicated by peripheral neuropathy  - PVD  - Hyperkeratoses, left and right foot  - Pain, left and  right foot        Plan:  - Pt examined and evaluated.  All findings discussed to patient to their satisfaction and understanding.  - Reviewed labs and chart.  - Systemic conditions managed by primary team, antibiotics managed by ID.  - Removed all hyperkeratotic tissue from left and right foot with #15 scalpel blade without incident.  No open lesions noted to callused area.  -No SOI noted to left fourth digit wound at this time.  Wound does not probe to bone or have exposed tendon.  - Performed dressing change consisting of Betadine paint, Debora, and a border foam to left 4th digit.  Applied border foam to plantar lateral left foot to pad and protect.  Dressings to be changed every other day by podiatry.  - Pt to follow up in office upon discharge.  Will continue to follow inpatient.  Please contact podiatry with any acute questions/concerns.     - Thank you for the consult.      Anibal Silveira DPM  Podiatric Surgery  Doc Halo/Clemente BUNDY spent over 30 minutes in the professional and overall care of this patient.        Anibal Silveira DPM

## 2023-10-25 NOTE — PROGRESS NOTES
"Bing Holliday is a 62 y.o. female on day 5 of admission presenting with Disorientation.    Subjective   Patient seen and examined  No overnight events.    Patient resting in recliner, NAD  Has no complaints this am  States finger stick glucose 26 this am, however serum glucose 75  In DC status awaiting SNF    Objective     Physical Exam    Constitutional: Well developed, awake/alert/oriented x3, no distress, alert and cooperative  HEENT: anicteric sclera, eomi, no oral lesions noted, moist orophraynx, no thyromegaly  Cardiovascular: Regular, rate and rhythm, no murmurs, 2+ equal pulses of the extremities, normal S1 and S2  Respiratory/Thorax: Patent airways, CTAB, normal breath sounds with good chest expansion, thorax symmetric, no conversational dyspnea  Gastrointestinal: +BS, Nondistended, soft, non-tender, no rebound tenderness or guarding, no masses palpable, no organomegaly  Musculoskeletal: +chronic b/l hand contractures, limited ROM b/l LE, decreased strength b/l  Extremities: no edema  Skin: warm and dry. No rashes nor lesions noted  Neurological: alert and oriented x3, intact senses, motor, follows commands, clear speech, no facial droop    Last Recorded Vitals  Blood pressure 142/67, pulse 66, temperature 35.1 °C (95.2 °F), temperature source Temporal, resp. rate 18, height 1.803 m (5' 11\"), weight 94.6 kg (208 lb 8.9 oz), SpO2 100 %.  Intake/Output last 3 Shifts:  I/O last 3 completed shifts:  In: 2679.7 (28.3 mL/kg) [P.O.:480; I.V.:2149.7 (22.7 mL/kg); IV Piggyback:50]  Out: 850 (9 mL/kg) [Urine:850 (0.2 mL/kg/hr)]  Weight: 94.6 kg     Relevant Results  Scheduled medications  acetaminophen, 975 mg, oral, q8h  amLODIPine, 2.5 mg, oral, Daily  apixaban, 2.5 mg, oral, BID  atorvastatin, 40 mg, oral, Nightly  budesonide, 0.5 mg, nebulization, BID  cefTRIAXone, 1 g, intravenous, q24h  fludrocortisone, 0.1 mg, oral, Every other day  folic acid, 1 mg, oral, Daily  insulin lispro, 0-10 Units, subcutaneous, " Before meals & nightly  ipratropium-albuteroL, 3 mL, nebulization, TID  lidocaine, 1 patch, transdermal, Daily  melatonin, 5 mg, oral, Nightly  multivitamin with minerals, 1 tablet, oral, Daily  mycophenolate, 500 mg, oral, BID  pantoprazole, 40 mg, oral, Daily before breakfast   Or  pantoprazole, 40 mg, intravenous, Daily before breakfast  predniSONE, 5 mg, oral, Daily  risperiDONE, 0.5 mg, oral, Nightly  [Held by provider] torsemide, 10 mg, oral, Daily      Continuous medications       PRN medications  PRN medications: dextrose, glucagon, loperamide, melatonin, metoclopramide **OR** metoclopramide, ondansetron ODT **OR** ondansetron, oxyCODONE, polyethylene glycol  Results from last 7 days   Lab Units 10/25/23  0605 10/24/23  0608 10/23/23  0803   WBC AUTO x10*3/uL 4.8 4.0* 3.9*   RBC AUTO x10*6/uL 2.69* 2.87* 3.01*   HEMOGLOBIN g/dL 7.8* 8.4* 8.9*       Results from last 7 days   Lab Units 10/25/23  0605 10/24/23  0608 10/23/23  0803 10/21/23  0637 10/20/23  0600 10/19/23  1455   SODIUM mmol/L 143 142 143   < > 143 141   POTASSIUM mmol/L 5.2 4.9 4.8   < > 5.1 5.4*   CHLORIDE mmol/L 114* 115* 114*   < > 113* 113*   CO2 mmol/L 23 21 23   < > 23 23   BUN mg/dL 45* 42* 46*   < > 56* 55*   CREATININE mg/dL 1.62* 1.48* 1.70*   < > 1.52* 1.47*   CALCIUM mg/dL 8.3* 8.0* 8.6   < > 8.6 8.9   MAGNESIUM mg/dL 1.76 1.71 1.73   < >  --  1.88   BILIRUBIN TOTAL mg/dL  --   --   --   --  0.2 0.2   ALT U/L  --   --   --   --  26 28   AST U/L  --   --   --   --  32 37    < > = values in this interval not displayed.         FENG without exercise    Result Date: 10/21/2023            [facility] [address] [place] [address]     Tel [phone] Fax [phone]  Vascular Lab Report        PVR FENG Patient Name:      LISBET NENITA Longoria Physician:  [id number] Enzo Ashley MD, RPVI Study Date:        10/21/2023            Ordering Provider:  [id number]  BURKE BRIAN                                                              YOJANA MRN/PID:           [id number]              Fellow: Accession#:        [id number]          Technologist:       Ginna Travis RVHEENA Date of Birth/Age: 1960 / 62 years Technologist 2: Gender:            F                     Encounter#:         [id number] Admission Status:  Inpatient             Location Performed: [facility]  Diagnosis/ICD: 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  CONCLUSIONS: Right Lower PVR: No evidence of arterial occlusive disease in the right lower extremity at rest. Normal digital perfusion noted. Triphasic flow is noted in the right common femoral artery, right posterior tibial artery and right dorsalis pedis artery. Ankle and digit tracings, waveforms and segmental pressures appear with in normal limits. Left Lower PVR: No evidence of arterial occlusive disease in the left lower extremity at rest. Normal digital perfusion noted. Triphasic flow is noted in the left common femoral artery, left posterior tibial artery and left dorsalis pedis artery. Ankle and digit tracings, waveforms and segmental pressures appear with in normal limits.  Imaging & Doppler Findings:  RIGHT Lower PVR                Pressures Ratios Right Posterior Tibial (Ankle) 141 mmHg  1.13 Right Dorsalis Pedis (Ankle)   138 mmHg  1.10 Right Digit (Great Toe)        133 mmHg  1.06   LEFT Lower PVR                Pressures Ratios Left Posterior Tibial (Ankle) 136 mmHg  1.09 Left Dorsalis Pedis (Ankle)   142 mmHg  1.14 Left Digit (Great Toe)        171 mmHg  1.37                      Right     Left Brachial Pressure 125 mmHg 125 mmHg   [id number] Enzo Ashley MD, RPVI Electronically signed by [id number] Enzo Ashley MD,  RPVI on 10/21/2023 at 4:35:21 PM  ** Final **     ECG 12 lead    Result Date: 10/20/2023  Sinus bradycardia Voltage criteria for left ventricular hypertrophy Abnormal ECG When compared with ECG of 30-AUG-2023 09:00, Sinus rhythm has replaced Atrial fibrillation Vent. rate has decreased BY  37 BPM QRS duration has increased Criteria for Septal infarct are no longer Present Criteria for Inferior infarct are no longer Present ST elevation has replaced ST depression in Lateral leads Confirmed by Caterina Dixon ([id number]) on 10/20/2023 11:20:43 PM       Assessment/Plan   Active Problems:    Ambulatory dysfunction    Myelodysplastic syndrome, unspecified (CMS/HCC)    Urinary incontinence    Weakness of both lower extremities  hypoglycemia    Plan:  Hypoglycemia  Recurrent chronic issue. Pt has been unable to obtain close endo follow up after prior admissions. Contacted endo on call and appreciate input. Further work up is in progress and follow up outpatient. Patient states episodes are asymptomatic  (beta 0.07) (C-peptide 3.0)  No evidence of pancreatic abnormality on imaging. No hx of DM nor chronic insulin use.  Incontinent of urine-urine culture-multiple contaminants, received 3 days rocephin for uncomplicated UTI  Cr at or near patient baseline  Neurology consulted signed off  Podiatry following for foot 4th digit wound, cont to follow outpatient  MRI of the lumbar spine.   secondary to severe spinal stenosis in the lumbar region -Compression Fracture   Consult neuro surgery_ Dr. Kearney-T7 compression fracture -->no indication for urgent surgery per recs, follow up outpatient  PT OT consults appreciated recommend skilled, patient is in agreement with POC  Home medications resumed  -will also need referral to outpatient endo as pt continues to have intermittent hypoglycemia (chronic issue)  No need for am labs, patient to be discharged to facility today  Time spent > 35 minutes  POC discussed with patient and  attending  Dispo: in DC status,  today        CODE STATUS: FULL CODE      Angela Cote, [facility]  [address] [address]  Phone: [phone] Fax: [phone]

## 2023-10-25 NOTE — CARE PLAN
The patient's goals for the shift include no injury related to falls able to get some rest/sleep by the end of this shif    The clinical goals for the shift include Increase physical strength      Problem: Fall/Injury  Goal: Verbalize understanding of personal risk factors for fall in the hospital  Outcome: Met  Goal: Verbalize understanding of risk factor reduction measures to prevent injury from fall in the home  Outcome: Met  Goal: Use assistive devices by end of the shift  Outcome: Met  Goal: Pace activities to prevent fatigue by end of the shift  Outcome: Met  Goal: Not fall by end of shift  Outcome: Met  Goal: Be free from injury by end of the shift  Outcome: Met     Problem: Pain  Goal: Takes deep breaths with improved pain control throughout the shift  Outcome: Met  Goal: Turns in bed with improved pain control throughout the shift  Outcome: Met  Goal: Walks with improved pain control throughout the shift  Outcome: Met  Goal: Performs ADL's with improved pain control throughout shift  Outcome: Met  Goal: Participates in PT with improved pain control throughout the shift  Outcome: Met     Problem: Skin  Goal: Decreased wound size/increased tissue granulation at next dressing change  10/25/2023 1307 by Irma Condon RN  Outcome: Met  10/25/2023 1141 by Irma Condon RN  Flowsheets (Taken 10/25/2023 1141)  Decreased wound size/increased tissue granulation at next dressing change:   Promote sleep for wound healing   Protective dressings over bony prominences  Goal: Participates in plan/prevention/treatment measures  Outcome: Met  Goal: Prevent/manage excess moisture  Outcome: Met  Goal: Prevent/minimize sheer/friction injuries  Outcome: Met  Goal: Promote/optimize nutrition  Outcome: Met  Goal: Promote skin healing  Outcome: Met     Problem: Dressings Lower Extremities  Goal: STG - Patient to complete lower body dressing MOD I  Outcome: Met     Problem: Grooming  Goal: STG - Patient completes grooming  SUP at sink  Outcome: Met  Goal: STG - Patient will tolerate standing 3-6 min to participate in ADLs  Outcome: Met     Problem: Pain  Goal: My pain/discomfort is manageable  Outcome: Met     Problem: Safety  Goal: Patient will be injury free during hospitalization  Outcome: Met  Goal: I will remain free of falls  Outcome: Met     Problem: Daily Care  Goal: Daily care needs are met  Outcome: Met   .

## 2023-10-25 NOTE — NURSING NOTE
Bing Holliday  3115/3115-A     MRN: [id number]  62 y.o. female  Hospital Day: 7 (10/19/2023)  Code Status: Full Code  Isolation Status: No active isolations  Allergies: Ace inhibitors, Hydroxychloroquine, Lisinopril, Penicillins, and Sulfa (sulfonamide antibiotics)  Diet: Adult diet 2-3 grams sodium Active Problems:    Ambulatory dysfunction    Myelodysplastic syndrome, unspecified (CMS/HCC)    Urinary incontinence    Weakness of both lower extremities   PCP: Claudio Hood [phone]  Treatment Team:   Attending Provider: Aishwarya Dee MD  Consulting Physician: Kong Reardon MD  Nurse Practitioner: Angela Cote, APRN-CNP  Consulting Physician: Claudio Kearney MD  On Call Attending Physician: Kinza Quiroz RN  Consulting Physician: Renetta Chand DPM  Surgeon: Anibal Silveira DPM  Physical Therapy Assistant: Garret Dunbar PTA  Registered Nurse: Tanika Tan RN  Registered Nurse: Irma Condon RN  Patient Care Technician: Diana Henriquez  Nurse Navigator: Capri Arnett RN   Vitals:  Temp:  [35 °C (95 °F)-36 °C (96.8 °F)] 36 °C (96.8 °F)  Heart Rate:  [54-64] 57  Resp:  [16-18] 16  BP: (114-142)/(57-79) 134/71  I/O:  No intake/output data recorded.  Vents:     Meds: DCW:    Scheduled Meds: acetaminophen, 975 mg, oral, q8h  amLODIPine, 2.5 mg, oral, Daily  apixaban, 2.5 mg, oral, BID  atorvastatin, 40 mg, oral, Nightly  budesonide, 0.5 mg, nebulization, BID  cefTRIAXone, 1 g, intravenous, q24h  fludrocortisone, 0.1 mg, oral, Every other day  folic acid, 1 mg, oral, Daily  insulin lispro, 0-10 Units, subcutaneous, Before meals & nightly  ipratropium-albuteroL, 3 mL, nebulization, TID  lidocaine, 1 patch, transdermal, Daily  melatonin, 5 mg, oral, Nightly  multivitamin with minerals, 1 tablet, oral, Daily  mycophenolate, 500 mg, oral, BID  pantoprazole, 40 mg, oral, Daily before breakfast   Or  pantoprazole, 40 mg, intravenous, Daily before breakfast  predniSONE, 5 mg, oral,  Daily  risperiDONE, 0.5 mg, oral, Nightly  [Held by provider] torsemide, 10 mg, oral, Daily      Continuous Infusions: sodium chloride 0.9%, 100 mL/hr, Last Rate: 100 mL/hr (10/24/23 0957)      PRN Meds: PRN medications: dextrose, glucagon, loperamide, melatonin, metoclopramide **OR** metoclopramide, ondansetron ODT **OR** ondansetron, oxyCODONE, polyethylene glycol Past Medical History:   Diagnosis Date    Acute upper respiratory infection, unspecified 03/04/2020    Acute URI    Acute upper respiratory infection, unspecified 09/30/2015    URTI (acute upper respiratory infection)    Arthritis     Body mass index (BMI) 23.0-23.9, adult 10/15/2021    BMI 23.0-23.9, adult    Body mass index (BMI) 33.0-33.9, adult 03/04/2020    BMI 33.0-33.9,adult    Cardiomegaly 08/27/2013    Left ventricular hypertrophy    Chronic kidney disease, stage 3 unspecified (CMS/HCC) 07/02/2013    Chronic kidney disease, stage III (moderate)    Disease of pericardium, unspecified 07/02/2013    Pericardial disease    Encounter for follow-up examination after completed treatment for conditions other than malignant neoplasm 10/06/2022    Hospital discharge follow-up    Generalized contraction of visual field, right eye 01/29/2015    Generalized contraction of visual field of right eye    Homonymous bilateral field defects, right side 04/29/2016    Homonymous bilateral field defects of right side    Hypertensive chronic kidney disease with stage 1 through stage 4 chronic kidney disease, or unspecified chronic kidney disease 07/02/2013    Nephrosclerosis    Laceration without foreign body, left foot, initial encounter 07/03/2018    Foot laceration, left, initial encounter    Migraine with aura, not intractable, without status migrainosus 10/24/2022    Ocular migraine    Other conditions influencing health status 07/02/2013    Chronic Glomerulonephritis In Diseases Classified Elsewhere    Other conditions influencing health status 07/02/2013     Progressive Familial Myoclonic Epilepsy    Other conditions influencing health status 07/02/2013    Protein S Deficiency    Other conditions influencing health status 05/22/2015    Familial Combined Hyperlipidemia    Other conditions influencing health status 10/24/2022    IDDM (insulin dependent diabetes mellitus)    Other conditions influencing health status 03/14/2022    Diabetes mellitus, insulin dependent (IDDM), uncontrolled    Other long term (current) drug therapy 10/24/2022    Long-term use of Plaquenil    Personal history of diseases of the blood and blood-forming organs and certain disorders involving the immune mechanism 07/02/2013    History of thrombocytopenia    Personal history of diseases of the skin and subcutaneous tissue 08/11/2015    History of foot ulcer    Personal history of nephrotic syndrome 07/02/2013    History of nephrotic syndrome    Personal history of other diseases of the circulatory system 08/27/2013    History of sinus tachycardia    Personal history of other diseases of the nervous system and sense organs     History of cataract    Personal history of other diseases of the respiratory system     History of bronchitis    Personal history of other infectious and parasitic diseases 07/02/2013    History of hepatitis    Personal history of other specified conditions     History of shortness of breath    Personal history of other specified conditions 08/27/2013    History of edema    Puckering of macula, right eye 10/24/2022    ERM OD (epiretinal membrane, right eye)    Raynaud's syndrome without gangrene 07/02/2013    Raynaud's disease    Systemic lupus erythematosus, unspecified (CMS/[facility]) 07/24/2015    SLE (systemic lupus erythematosus)    Systemic lupus erythematosus, unspecified (CMS/[facility]) 07/24/2015    SLE (systemic lupus erythematosus)    Systemic lupus erythematosus, unspecified (CMS/[facility]) 07/24/2015    Systemic lupus    Type 2 diabetes mellitus with diabetic nephropathy (CMS/[facility])  07/02/2013    Type 2 diabetes with nephropathy    Type 2 diabetes mellitus with mild nonproliferative diabetic retinopathy without macular edema, left eye (CMS/HCC) 07/27/2015    Non-proliferative diabetic retinopathy, left eye    Type 2 diabetes mellitus with mild nonproliferative diabetic retinopathy without macular edema, unspecified eye (CMS/HCC) 07/24/2015    Mild non proliferative diabetic retinopathy    Type 2 diabetes mellitus with proliferative diabetic retinopathy without macular edema, right eye (CMS/HCC) 07/27/2015    Proliferative diabetic retinopathy of right eye    Type 2 diabetes mellitus with proliferative diabetic retinopathy without macular edema, unspecified eye (CMS/HCC) 07/24/2015    Diabetic proliferative retinopathy    Unspecified acute and subacute iridocyclitis 07/24/2015    Acute iritis, right eye    Unspecified open wound, left foot, sequela 07/03/2018    Wound, open, foot, left, sequela

## 2023-10-25 NOTE — PROGRESS NOTES
Physical Therapy                 Therapy Communication Note    Patient Name: Bing Holliday  MRN: [id number]  Today's Date: 10/25/2023     Discipline: Physical Therapy    Room 3115    Missed Time: 1055  Missed Visit Reason: Declined  Comment: Attempted to see pt at this time but she declined to participate. She stated she was not feeling well earlier due to low blood sugar this AM.     Will see the next available time

## 2023-10-25 NOTE — NURSING NOTE
Pt discharged to [facility] transport [facility], IV access discontinued with tip intact, belongings given to Pt.

## 2023-10-25 NOTE — PROGRESS NOTES
Patient is medically ready for discharge. DSC confirmed the 7000 in the HENS. MAR, Dorantes Isrrael, and supporting documentation sent through all scripts. Transport arranged for 1300,  patient notified and she confirmed she will let her family know

## 2023-10-26 NOTE — DOCUMENTATION CLARIFICATION NOTE
"    PATIENT:               LISBET GUTIERRES  ACCT #:                  [id number]  MRN:                       [id number]  :                       1960  ADMIT DATE:       10/19/2023 2:17 PM  DISCH DATE:        10/25/2023 1:10 PM  RESPONDING PROVIDER #:        [id number]          PROVIDER RESPONSE TEXT:    Metabolic encephalopathy 2/2 acute cystitis    CDI QUERY TEXT:    [facility]_Altered Mental Status    Instruction:    Based on your assessment of the patient and the clinical information, please provide the requested documentation by clicking on the appropriate radio button and enter any additional information if prompted.    Question: Please further clarify the most likely etiology of the altered mental status as    When answering this query, please exercise your independent professional judgment. The fact that a question is being asked, does not imply that any particular answer is desired or expected.    The patient's clinical indicators include:  Clinical Information: Admit with disorientation and headache with compression fracture    Clinical Indicators:    ED note states \"Initially she was alert and oriented to person and place, but not year. She is also somewhat somnolent, but did arouse\"    H&P states \"disorientation\" and \"AMS\" and \"acute cystitis\"    Latest progress note, 10/22/23, states \" alert and oriented x3\"      Treatment: IV rocephin, IVF, lab monitoring, neuro assessment    Risk Factors: acute cystitis, ams/disorientation  Options provided:  -- Metabolic encephalopathy 2/2 acute cystitis  -- Other - I will add my own diagnosis  -- Refer to Clinical Documentation Reviewer    Query created by: Senthil Velasco on 10/25/2023 12:09 PM      Electronically signed by:  VANE FERNANDEZ-CNP 10/26/2023 4:26 PM          "

## 2023-10-26 NOTE — DOCUMENTATION CLARIFICATION NOTE
PATIENT:               LISBET GUTIERRES  ACCT #:                  [id number]  MRN:                       [id number]  :                       1960  ADMIT DATE:       10/19/2023 2:17 PM  DISCH DATE:        10/25/2023 1:10 PM  RESPONDING PROVIDER #:        [id number]          PROVIDER RESPONSE TEXT:    Pancytopenia due to other history of SLE and unspecified MDS.    CDI QUERY TEXT:    [facility]_Pancytopenia Diagnosis    Instruction:    Based on your assessment of the patient and the clinical information, please provide the requested documentation by clicking on the appropriate radio button and enter any additional information if prompted.    Question: Is there a diagnosis indicative of the above lab values    When answering this query, please exercise your independent professional judgment. The fact that a question is being asked, does not imply that any particular answer is desired or expected.    The patient's clinical indicators include:  Clinical Information: Admit with disorientation and acute cystitis, compression fracture    Clinical Indicators:    WBC/HGB/PLT on admit, 3.7/9.2/100.    Day 2, 4.4/8.2/100.    Day 3, 3.9/8.7/98    Day 4, 4.8/9.3/104    Day 5 of admit, 10/23/23, 3.9/8.9/104.    Per H&P history of SLE and unspecified MDS. Currently on Cellcept BID.    Treatment: lab monitoring, Cellcept 500mg BID, forinef 0.1mg every other day    Risk Factors: low wbc/hgb/plt in setting of mds and sle on cellcept  Options provided:  -- Pancytopenia due to medications  -- Pancytopenia due to other, Please specify additional information below  -- Lab values not indicative of pancytopenia  -- Other - I will add my own diagnosis  -- Refer to Clinical Documentation Reviewer    Query created by: Senthil Velasco on 10/25/2023 12:12 PM      Electronically signed by:  VANE DAILEY 10/26/2023 4:26 PM

## 2023-10-29 ENCOUNTER — NURSING HOME VISIT (OUTPATIENT)
Dept: POST ACUTE CARE | Facility: EXTERNAL LOCATION | Age: 63
End: 2023-10-29
Payer: MEDICARE

## 2023-10-29 DIAGNOSIS — E78.5 HYPERLIPIDEMIA, UNSPECIFIED HYPERLIPIDEMIA TYPE: Primary | ICD-10-CM

## 2023-10-29 DIAGNOSIS — M48.54XS NONTRAUMATIC COMPRESSION FRACTURE OF T4 VERTEBRA, SEQUELA: ICD-10-CM

## 2023-10-29 DIAGNOSIS — R29.898 WEAKNESS OF BOTH LOWER EXTREMITIES: ICD-10-CM

## 2023-10-29 DIAGNOSIS — R26.2 AMBULATORY DYSFUNCTION: ICD-10-CM

## 2023-10-29 LAB
CHLORPROPAMIDE SERPL-MCNC: NORMAL MCG/ML
GLIMEPIRIDE SERPL-MCNC: NORMAL NG/ML
GLIPIZIDE SERPL-MCNC: NORMAL NG/ML
GLYBURIDE SERPL-MCNC: NORMAL NG/ML
NATEGLINIDE SERPL-MCNC: NORMAL MCG/ML
PIOGLITAZONE: NORMAL NG/ML
REPAGLINIDE SERPL-MCNC: NORMAL NG/ML
ROSIGLITAZONE: NORMAL NG/ML
TOLAZAMIDE SERPL-MCNC: NORMAL MCG/ML
TOLBUTAMIDE SERPL-MCNC: NORMAL MCG/ML

## 2023-10-29 PROCEDURE — 99306 1ST NF CARE HIGH MDM 50: CPT | Performed by: STUDENT IN AN ORGANIZED HEALTH CARE EDUCATION/TRAINING PROGRAM

## 2023-10-29 NOTE — LETTER
Patient: Bing oHlliday  : 1960    Encounter Date: 10/29/2023    Subjective  Patient ID: Bing Holliday is a 62 y.o. female.    Patient is a 62-year-old female who presents to the ED for evaluation of lower extremity weakness as well as urinary incontinence.  Patient was seen in the ED, and UA consistent with a UTI.  Due to issues with walking, patient was admitted for further evaluation.  On evaluation in the hospital, patient was found to have compression fracture of thoracic spine.  Patient was seen by neurosurgery and recommended outpatient follow-up, and was recommended outpatient endocrine follow-up.  Patient was admitted to SNF for further evaluation.  On evaluation, patient is overall feeling well, however she reports some persistent nausea.  States that Zofran has not been helping.  Otherwise, states that her pain is well controlled without any issues.  Blood sugars have also been stable as well.  In addition, she reports that she is having persistent diarrhea as well but has been fairly persistent as well.  Regards no additional issues at this time.    Review of Systems   Constitutional: Negative.    HENT: Negative.     Respiratory: Negative.     Cardiovascular: Negative.    Gastrointestinal:  Positive for diarrhea, nausea and vomiting.   Musculoskeletal:  Positive for arthralgias and back pain.   Neurological:  Positive for weakness.   Psychiatric/Behavioral: Negative.         ObjectiveVitals Reviewed via facility EMR   Physical Exam  Constitutional:       General: She is not in acute distress.     Appearance: She is ill-appearing.   Eyes:      Pupils: Pupils are equal, round, and reactive to light.   Cardiovascular:      Rate and Rhythm: Normal rate and regular rhythm.      Pulses: Normal pulses.      Heart sounds: No murmur heard.  Pulmonary:      Effort: No respiratory distress.      Breath sounds: No wheezing.   Abdominal:      General: Abdomen is flat. Bowel sounds are normal. There is no  distension.      Tenderness: There is abdominal tenderness.   Musculoskeletal:      Right lower leg: No edema.      Left lower leg: No edema.   Skin:     General: Skin is warm and dry.   Neurological:      Mental Status: She is alert. Mental status is at baseline.      Cranial Nerves: No cranial nerve deficit.      Motor: No weakness.   Psychiatric:         Mood and Affect: Mood normal.         Behavior: Behavior normal.         Assessment/Plan  Diagnoses and all orders for this visit:  Hyperlipidemia, unspecified hyperlipidemia type  Weakness of both lower extremities  Ambulatory dysfunction  Nontraumatic compression fracture of T4 vertebra, sequela  Patient seen and evaluated, hospital course reviewed.  Is having persistent diarrhea and nausea and vomiting.  We will trial Zofran, transition over to Compazine as patient  has done well on this in the past.  Suspect underlying rheumatological flare due to persistent pain as well as diarrhea, started on steroid and wean down back to regular dosing.  Obtain C. difficile sample.  Continue pain control, supportive care, PT OT.  Will have follow-up with specialist.    Ayaan Koehler DO         Electronically Signed By: Ayaan Koehler DO   10/30/23 10:25 PM

## 2023-10-30 ENCOUNTER — TELEPHONE (OUTPATIENT)
Dept: PRIMARY CARE | Facility: CLINIC | Age: 63
End: 2023-10-30
Payer: MEDICARE

## 2023-10-30 PROBLEM — H53.451 ALTITUDINAL SCOTOMA OF RIGHT EYE: Status: ACTIVE | Noted: 2023-10-30

## 2023-10-30 PROBLEM — R79.89 ABNORMAL THYROID BLOOD TEST: Status: ACTIVE | Noted: 2023-10-30

## 2023-10-30 PROBLEM — I48.91 ATRIAL FIBRILLATION (MULTI): Status: ACTIVE | Noted: 2023-10-30

## 2023-10-30 PROBLEM — D58.1: Status: ACTIVE | Noted: 2023-10-30

## 2023-10-30 PROBLEM — M79.7 FIBROMYALGIA: Status: ACTIVE | Noted: 2023-10-30

## 2023-10-30 PROBLEM — J44.9 OBSTRUCTIVE LUNG DISEASE (MULTI): Status: ACTIVE | Noted: 2023-10-30

## 2023-10-30 PROBLEM — H53.432 ARCUATE SCOTOMA OF LEFT EYE: Status: ACTIVE | Noted: 2023-10-30

## 2023-10-30 PROBLEM — R00.1 BRADYCARDIA: Status: ACTIVE | Noted: 2023-03-28

## 2023-10-30 PROBLEM — H93.12 TINNITUS OF LEFT EAR: Status: ACTIVE | Noted: 2023-10-30

## 2023-10-30 PROBLEM — M85.80 OSTEOPENIA: Status: ACTIVE | Noted: 2023-10-30

## 2023-10-30 PROBLEM — N18.30 CHRONIC KIDNEY DISEASE (CKD), STAGE III (MODERATE) (MULTI): Status: ACTIVE | Noted: 2023-10-30

## 2023-10-30 PROBLEM — D64.9 ANEMIA: Status: ACTIVE | Noted: 2023-10-30

## 2023-10-30 PROBLEM — E11.21 DIABETIC NEPHROPATHY (MULTI): Status: ACTIVE | Noted: 2023-10-30

## 2023-10-30 PROBLEM — R19.5 POSITIVE COLORECTAL CANCER SCREENING USING COLOGUARD TEST: Status: ACTIVE | Noted: 2023-10-30

## 2023-10-30 PROBLEM — I49.3 ASYMPTOMATIC PVCS: Status: ACTIVE | Noted: 2023-10-30

## 2023-10-30 PROBLEM — L30.9 ECZEMA: Status: ACTIVE | Noted: 2023-10-30

## 2023-10-30 PROBLEM — R42 DIZZINESS: Status: ACTIVE | Noted: 2023-10-30

## 2023-10-30 PROBLEM — J44.9 ADVANCED COPD (MULTI): Status: ACTIVE | Noted: 2023-10-30

## 2023-10-30 PROBLEM — K21.9 GERD (GASTROESOPHAGEAL REFLUX DISEASE): Status: ACTIVE | Noted: 2023-10-30

## 2023-10-30 PROBLEM — M81.0 OSTEOPOROSIS: Status: ACTIVE | Noted: 2023-10-30

## 2023-10-30 PROBLEM — R80.1 PERSISTENT PROTEINURIA: Status: ACTIVE | Noted: 2023-10-30

## 2023-10-30 PROBLEM — Z86.69 H/O IRITIS: Status: ACTIVE | Noted: 2023-10-30

## 2023-10-30 PROBLEM — H25.811 COMBINED FORMS OF AGE-RELATED CATARACT OF RIGHT EYE: Status: ACTIVE | Noted: 2023-10-30

## 2023-10-30 PROBLEM — R06.02 SHORTNESS OF BREATH ON EXERTION: Status: ACTIVE | Noted: 2023-10-30

## 2023-10-30 PROBLEM — R00.2 PALPITATIONS: Status: ACTIVE | Noted: 2023-10-30

## 2023-10-30 PROBLEM — F32.9 DEPRESSION, MAJOR: Status: ACTIVE | Noted: 2023-10-30

## 2023-10-30 PROBLEM — K52.9 CHRONIC DIARRHEA: Status: ACTIVE | Noted: 2023-10-30

## 2023-10-30 PROBLEM — I42.9 CARDIOMYOPATHY (MULTI): Status: ACTIVE | Noted: 2023-10-30

## 2023-10-30 PROBLEM — I63.9 CVA (CEREBRAL VASCULAR ACCIDENT) (MULTI): Status: ACTIVE | Noted: 2023-10-30

## 2023-10-30 PROBLEM — R91.8 LUNG NODULE, MULTIPLE: Status: ACTIVE | Noted: 2023-10-30

## 2023-10-30 PROBLEM — M10.9 GOUTY ARTHROPATHY: Status: ACTIVE | Noted: 2023-10-30

## 2023-10-30 PROBLEM — R26.89 BALANCE PROBLEM: Status: ACTIVE | Noted: 2023-10-30

## 2023-10-30 PROBLEM — I10 BENIGN ESSENTIAL HTN: Status: ACTIVE | Noted: 2023-10-30

## 2023-10-30 PROBLEM — R06.83 SNORING: Status: ACTIVE | Noted: 2023-10-30

## 2023-10-30 PROBLEM — R10.9 ABDOMINAL CRAMPING: Status: ACTIVE | Noted: 2023-10-30

## 2023-10-30 PROBLEM — M32.14 LUPUS NEPHRITIS (MULTI): Status: ACTIVE | Noted: 2023-10-30

## 2023-10-30 PROBLEM — G51.4 FACIAL TWITCHING: Status: ACTIVE | Noted: 2023-10-30

## 2023-10-30 PROBLEM — R74.8 HIGH LEVEL OF CARDIAC MARKER: Status: ACTIVE | Noted: 2023-10-30

## 2023-10-30 PROBLEM — R60.0 LEG EDEMA, LEFT: Status: ACTIVE | Noted: 2023-10-30

## 2023-10-30 PROBLEM — M51.379 DEGENERATION OF LUMBAR/LUMBOSACRAL DISC WITHOUT MYELOPATHY: Status: ACTIVE | Noted: 2023-10-30

## 2023-10-30 PROBLEM — G93.40 ENCEPHALOPATHY: Status: ACTIVE | Noted: 2023-10-30

## 2023-10-30 PROBLEM — N18.31 CKD STAGE G3A/A2, GFR 45-59 AND ALBUMIN CREATININE RATIO 30-299 MG/G (MULTI): Status: ACTIVE | Noted: 2023-10-30

## 2023-10-30 PROBLEM — H52.7 REFRACTIVE ERROR: Status: ACTIVE | Noted: 2023-10-30

## 2023-10-30 PROBLEM — R53.82 CHRONIC FATIGUE: Status: ACTIVE | Noted: 2023-10-30

## 2023-10-30 PROBLEM — B35.1 FUNGAL NAIL INFECTION: Status: ACTIVE | Noted: 2023-10-30

## 2023-10-30 PROBLEM — I27.21 SECONDARY PULMONARY ARTERIAL HYPERTENSION (MULTI): Status: ACTIVE | Noted: 2023-10-30

## 2023-10-30 PROBLEM — M79.89 SWELLING OF RIGHT FOOT: Status: ACTIVE | Noted: 2023-10-30

## 2023-10-30 PROBLEM — M72.0 DUPUYTREN'S CONTRACTURE: Status: ACTIVE | Noted: 2023-10-30

## 2023-10-30 PROBLEM — H53.459 PERIPHERAL VISUAL FIELD DEFECT: Status: ACTIVE | Noted: 2023-10-30

## 2023-10-30 PROBLEM — Z86.79 H/O ORTHOSTATIC HYPOTENSION: Status: ACTIVE | Noted: 2023-10-30

## 2023-10-30 PROBLEM — G93.41 METABOLIC ENCEPHALOPATHY: Status: ACTIVE | Noted: 2023-07-22

## 2023-10-30 PROBLEM — R55 SYNCOPE AND COLLAPSE: Status: ACTIVE | Noted: 2023-10-30

## 2023-10-30 PROBLEM — M51.37 DEGENERATION OF LUMBAR/LUMBOSACRAL DISC WITHOUT MYELOPATHY: Status: ACTIVE | Noted: 2023-10-30

## 2023-10-30 PROBLEM — R40.0 DAYTIME SOMNOLENCE: Status: ACTIVE | Noted: 2023-10-30

## 2023-10-30 PROBLEM — R40.20 LOSS OF CONSCIOUSNESS (MULTI): Status: ACTIVE | Noted: 2023-10-30

## 2023-10-30 PROBLEM — M12.9 ARTHROPATHY: Status: ACTIVE | Noted: 2023-10-30

## 2023-10-30 PROBLEM — T68.XXXA HYPOTHERMIA: Status: ACTIVE | Noted: 2023-10-30

## 2023-10-30 PROBLEM — R93.1 ABNORMAL ECHOCARDIOGRAM: Status: ACTIVE | Noted: 2023-10-30

## 2023-10-30 PROBLEM — S70.01XA HIP HEMATOMA, RIGHT: Status: ACTIVE | Noted: 2023-10-30

## 2023-10-30 PROBLEM — R74.8 ELEVATED ALKALINE PHOSPHATASE LEVEL: Status: ACTIVE | Noted: 2023-10-30

## 2023-10-30 PROBLEM — M24.549 CONTRACTURE OF HAND: Status: ACTIVE | Noted: 2023-10-30

## 2023-10-30 PROBLEM — E16.2 LOW BLOOD SUGAR READING: Status: ACTIVE | Noted: 2023-10-30

## 2023-10-30 PROBLEM — I82.409 DVT (DEEP VENOUS THROMBOSIS) (MULTI): Status: ACTIVE | Noted: 2023-10-30

## 2023-10-30 PROBLEM — R22.42 NODULE OF SKIN OF LEFT LOWER LEG: Status: ACTIVE | Noted: 2023-10-30

## 2023-10-30 PROBLEM — E11.42 DM TYPE 2 WITH DIABETIC PERIPHERAL NEUROPATHY (MULTI): Status: ACTIVE | Noted: 2023-10-30

## 2023-10-30 PROBLEM — F29 PSYCHOSIS (MULTI): Status: ACTIVE | Noted: 2023-10-30

## 2023-10-30 PROBLEM — H25.812 COMBINED FORMS OF AGE-RELATED CATARACT OF LEFT EYE: Status: ACTIVE | Noted: 2023-10-30

## 2023-10-30 PROBLEM — I27.20 PULMONARY HYPERTENSION (MULTI): Status: ACTIVE | Noted: 2023-07-22

## 2023-10-30 PROBLEM — H21.561 AFFERENT PUPILLARY DEFECT OF RIGHT EYE: Status: ACTIVE | Noted: 2023-10-30

## 2023-10-30 PROBLEM — M48.062 SPINAL STENOSIS OF LUMBAR REGION WITH NEUROGENIC CLAUDICATION: Status: ACTIVE | Noted: 2023-10-30

## 2023-10-30 PROBLEM — R25.2 MUSCLE CRAMPS: Status: ACTIVE | Noted: 2023-10-30

## 2023-10-30 PROBLEM — L97.529: Status: ACTIVE | Noted: 2023-10-30

## 2023-10-30 PROBLEM — G47.00 INSOMNIA: Status: ACTIVE | Noted: 2023-10-30

## 2023-10-30 PROBLEM — R26.9 ABNORMAL GAIT: Status: ACTIVE | Noted: 2023-10-30

## 2023-10-30 PROBLEM — I80.02 THROMBOPHLEBITIS OF SUPERFICIAL VEINS OF LEFT LOWER EXTREMITY: Status: ACTIVE | Noted: 2023-10-30

## 2023-10-30 PROBLEM — D61.818 PANCYTOPENIA (MULTI): Status: ACTIVE | Noted: 2023-10-30

## 2023-10-30 RX ORDER — BLOOD-GLUCOSE CONTROL, NORMAL
EACH MISCELLANEOUS
COMMUNITY
End: 2023-11-30 | Stop reason: ALTCHOICE

## 2023-10-30 RX ORDER — CALCIUM CARBONATE 300MG(750)
400 TABLET,CHEWABLE ORAL DAILY
Status: ON HOLD | COMMUNITY
End: 2024-01-19 | Stop reason: WASHOUT

## 2023-10-30 RX ORDER — ACETAMINOPHEN 325 MG/1
650 TABLET ORAL EVERY 4 HOURS PRN
COMMUNITY
End: 2024-01-28 | Stop reason: HOSPADM

## 2023-10-30 ASSESSMENT — ENCOUNTER SYMPTOMS
WEAKNESS: 1
DIARRHEA: 1
NAUSEA: 1
CONSTITUTIONAL NEGATIVE: 1
CARDIOVASCULAR NEGATIVE: 1
RESPIRATORY NEGATIVE: 1
ARTHRALGIAS: 1
VOMITING: 1
BACK PAIN: 1
PSYCHIATRIC NEGATIVE: 1

## 2023-10-30 NOTE — TELEPHONE ENCOUNTER
FW: Cosign-Required Note  Received: 4 weeks ago  Claudio Hood, DO  P Do [id number]  Okay to refer to neuropsych,, with her history of hallucinations and also neuromuscular issues          Previous Messages    Attached Notes    Progress Notes by TEODORA Sung at 9/29/2023 11:59 PM    Author: TEODORA Sung Service: Primary Care Author Type:    Filed: 10/2/2023  9:49 AM Creation Time: 10/2/2023  9:45 AM Note Type: Progress Notes   Status: Cosign Needed : TEODORA Sung ()   Cosign Required: Yes   BHM spoke to patient 9/29 to explore if pt has been reached by Neurology department for evaluation.  Pt stated someone did call her but couldn't be seen until March 2024.  [facility] learned that Neuropsych has openings in October if PCP feels this would be an appropriate referral.  M alerted PCP of this wait time.  GERI Sung LSW

## 2023-10-31 ENCOUNTER — APPOINTMENT (OUTPATIENT)
Dept: BEHAVIORAL HEALTH | Facility: CLINIC | Age: 63
End: 2023-10-31
Payer: MEDICARE

## 2023-10-31 NOTE — DOCUMENTATION CLARIFICATION NOTE
"    PATIENT:               LISBET GUTIERRES  ACCT #:                  [id number]  MRN:                       [id number]  :                       1960  ADMIT DATE:       10/19/2023 2:17 PM  DISCH DATE:        10/25/2023 1:10 PM  RESPONDING PROVIDER #:        [id number]          PROVIDER RESPONSE TEXT:    Excisional debridement to and including skin    CDI QUERY TEXT:    [facility]_Debridement    Instruction:    Based on your assessment of the patient and the clinical information, please provide the requested documentation by clicking on the appropriate radio button and enter any additional information if prompted.    Question: Please further clarify the debridement procedure as    When answering this query, please exercise your independent professional judgment. The fact that a question is being asked, does not imply that any particular answer is desired or expected.    The patient's clinical indicators include:  Clinical Information: This query refers to the procedure performed on 10/21/23 and documented as debridement.    Your note 10/21/23 states \"- Full thickness non pressure ulcer 4th digit, left foot - DM-II complicated by peripheral neuropathy- PVD - Hyperkeratoses, left and right foot\" and \"Removed all hyperkeratotic tissue from left and right foot with #15 scalpel blade without incident.\"  Options provided:  -- Excisional debridement to and including skin  -- Excisional debridement down to and including subcutaneous tissue and fascia  -- Excisional debridement down to and including muscle  -- Other - I will add my own diagnosis  -- Refer to Clinical Documentation Reviewer    Query created by: Senthil Velasco on 10/23/2023 9:41 AM      Electronically signed by:  BURKE DIAL DPM 10/31/2023 1:05 PM          "

## 2023-10-31 NOTE — PROGRESS NOTES
Subjective   Patient ID: Bing Holliday is a 62 y.o. female.    Patient is a 62-year-old female who presents to the ED for evaluation of lower extremity weakness as well as urinary incontinence.  Patient was seen in the ED, and UA consistent with a UTI.  Due to issues with walking, patient was admitted for further evaluation.  On evaluation in the hospital, patient was found to have compression fracture of thoracic spine.  Patient was seen by neurosurgery and recommended outpatient follow-up, and was recommended outpatient endocrine follow-up.  Patient was admitted to SNF for further evaluation.  On evaluation, patient is overall feeling well, however she reports some persistent nausea.  States that Zofran has not been helping.  Otherwise, states that her pain is well controlled without any issues.  Blood sugars have also been stable as well.  In addition, she reports that she is having persistent diarrhea as well but has been fairly persistent as well.  Regards no additional issues at this time.    Review of Systems   Constitutional: Negative.    HENT: Negative.     Respiratory: Negative.     Cardiovascular: Negative.    Gastrointestinal:  Positive for diarrhea, nausea and vomiting.   Musculoskeletal:  Positive for arthralgias and back pain.   Neurological:  Positive for weakness.   Psychiatric/Behavioral: Negative.         Objective Vitals Reviewed via facility EMR   Physical Exam  Constitutional:       General: She is not in acute distress.     Appearance: She is ill-appearing.   Eyes:      Pupils: Pupils are equal, round, and reactive to light.   Cardiovascular:      Rate and Rhythm: Normal rate and regular rhythm.      Pulses: Normal pulses.      Heart sounds: No murmur heard.  Pulmonary:      Effort: No respiratory distress.      Breath sounds: No wheezing.   Abdominal:      General: Abdomen is flat. Bowel sounds are normal. There is no distension.      Tenderness: There is abdominal tenderness.    Musculoskeletal:      Right lower leg: No edema.      Left lower leg: No edema.   Skin:     General: Skin is warm and dry.   Neurological:      Mental Status: She is alert. Mental status is at baseline.      Cranial Nerves: No cranial nerve deficit.      Motor: No weakness.   Psychiatric:         Mood and Affect: Mood normal.         Behavior: Behavior normal.         Assessment/Plan   Diagnoses and all orders for this visit:  Hyperlipidemia, unspecified hyperlipidemia type  Weakness of both lower extremities  Ambulatory dysfunction  Nontraumatic compression fracture of T4 vertebra, sequela  Patient seen and evaluated, hospital course reviewed.  Is having persistent diarrhea and nausea and vomiting.  We will trial Zofran, transition over to Compazine as patient  has done well on this in the past.  Suspect underlying rheumatological flare due to persistent pain as well as diarrhea, started on steroid and wean down back to regular dosing.  Obtain C. difficile sample.  Continue pain control, supportive care, PT OT.  Will have follow-up with specialist.    Ayaan Koehler DO

## 2023-10-31 NOTE — PROGRESS NOTES
[facility]      Assessment/Plan     {Assess/PlanSmartLinks:75843}    Suicide Risk Assessment  ***      Subjective   Bing Holliday, a 62 y.o. female, for initial intake assessment and plan.    Context  Relationships:   Work/School:   Spirituality:   Activities:   Exercise:   Eating:   Sleeping:   Compulsive activities:     Social History     Socioeconomic History    Marital status:      Spouse name: Not on file    Number of children: Not on file    Years of education: Not on file    Highest education level: Not on file   Occupational History    Not on file   Tobacco Use    Smoking status: Never    Smokeless tobacco: Never   Vaping Use    Vaping Use: Never used   Substance and Sexual Activity    Alcohol use: Never    Drug use: Never    Sexual activity: Defer   Other Topics Concern    Not on file   Social History Narrative    Not on file     Social Determinants of Health     Financial Resource Strain: Low Risk  (10/19/2023)    Overall Financial Resource Strain (CARDIA)     Difficulty of Paying Living Expenses: Not hard at all   Food Insecurity: Not on file   Transportation Needs: No Transportation Needs (10/19/2023)    PRAPARE - Transportation     Lack of Transportation (Medical): No     Lack of Transportation (Non-Medical): No   Physical Activity: Not on file   Stress: Not on file   Social Connections: Not on file   Intimate Partner Violence: Not on file   Housing Stability: Low Risk  (10/19/2023)    Housing Stability Vital Sign     Unable to Pay for Housing in the Last Year: No     Number of Places Lived in the Last Year: 1     Unstable Housing in the Last Year: No       Record Review: moderate  Chart history:  62 y.o. female with unspecified depression and unspecified psychosis thought to be persistent delirium per previous inpatient psych CL notes, last mentioned in 2/2023 following multiple admissions with nursing home stays in between. She is also medically complicated with  "Afib on Eliquis, DM2 with neuropathy, retinopathy, and CKD3 on Dexcom, hx CVA, SLE with hx lupus nephritis, tinnitus, chronic diarrhea on loperamide, fibromyalgia.  Recent admission for thoracic vertebral fracture causing LE weakness as well as UTI. Multiple past admissions for hypoglycemia.    Most want to change:   Imagined result:   HPI:       Current Psychiatric Medications:  Risperidone 0.5 mg nightly  Melatonin 5 mg nightly    Past Psychiatric History:  Diagnoses:   Medications:   Therapy:   Inpatient:   Suicidality:     Psychiatric Review Of Systems:  Depressive Symptoms:{Depressive symptoms:11638::\"N/A\"}  Manic Symptoms:   Anxiety Symptoms:{Anxiety Symptoms:62977}  Disordered Eating Symptoms:{Disordered Eating Symptoms:28586}  Inattentive Symptoms:{Inattentive Symptoms:90485}   Hyperactive/Impulsive Symptoms:{Hyperactive/Impulsive Symp[toms:57571}  Trauma Symptoms:{Trauma Symptoms:14111}  Psychotic Symptoms:     Medical Review Of Systems:  {ros; complete:25177}    Current Medications:    Current Outpatient Medications:     acetaminophen (Tylenol) 325 mg tablet, Take 2 tablets (650 mg) by mouth., Disp: , Rfl:     amLODIPine (Norvasc) 2.5 mg tablet, Take 1 tablet (2.5 mg) by mouth once daily., Disp: , Rfl:     apixaban (Eliquis) 5 mg tablet, Take 0.5 tablets (2.5 mg) by mouth 2 times a day., Disp: 30 tablet, Rfl: 0    atorvastatin (Lipitor) 40 mg tablet, Take 1 tablet (40 mg) by mouth once daily., Disp: 90 tablet, Rfl: 3    belimumab (Benlysta) 120 mg recon soln IV injection, Infuse 10mg/kg (900mg) IV For Maintenance: every 4 weeks, Disp: 8 each, Rfl: 3    belimumab (Benlysta) 400 mg recon soln IV injection, Infuse 10 mg/kg into a venous catheter every 28 (twenty-eight) days.  FOR MAINTENANCE THERAPY, Disp: , Rfl:     blood-glucose sensor (Dexcom G6 Sensor) device, Use to check sugars 3 times daily, Disp: 4 each, Rfl: 2    blood-glucose sensor device, USE AS DIRECTED TO TEST BLOOD GLUCOSE. CHANGE EVERY 14 " DAYS, Disp: 2 each, Rfl: 1    Ca carb-D3-mag vp-vlf-tmrt-Zn 300 mg-20 mcg- 25 mg-0.5 mg tablet, Take 1 tablet by mouth 2 times a day., Disp: , Rfl:     Dexcom G4 platinum  (Dexcom G6 ) misc, Use as instructed, Disp: 1 each, Rfl: 0    Dexcom G4 platinum transmitter (Dexcom G6 Transmitter) device, Use as instructed, Disp: 1 each, Rfl: 0    fludrocortisone (Florinef) 0.1 mg tablet, Take 1 tablet (0.1 mg) by mouth every other day., Disp: 45 tablet, Rfl: 1    fluticasone-umeclidin-vilanter (TRELEGY-ELLIPTA) 200-62.5-25 mcg blister with device, INHALE 1 PUFF BY MOUTH ONCE DAILY, Disp: 60 each, Rfl: 2    folic acid (Folvite) 1 mg tablet, TAKE 1 TABLET BY MOUTH EVERY DAY, Disp: 90 tablet, Rfl: 1    lancets (OneTouch Delica Plus Lancet) 30 gauge misc, , Disp: , Rfl:     loperamide (Imodium) 2 mg capsule, TAKE 1 CAPSULE BY MOUTH THREE TIMES A DAY WITH A MEAL FOR LOOSE STOOLS, Disp: 90 capsule, Rfl: 0    magnesium oxide (Mag-Ox) 400 mg tablet, Take 1 tablet (400 mg) by mouth once daily., Disp: , Rfl:     melatonin 5 mg tablet, Take 1 tablet (5 mg) by mouth once daily at bedtime., Disp: , Rfl:     multivitamin tablet, Take 1 tablet by mouth once daily., Disp: , Rfl:     mycophenolate (Cellcept) 500 mg tablet, Take 2 tablets (1,000 mg) by mouth twice a day., Disp: , Rfl:     OneTouch Verio test strips strip, 1 strip in the morning, at noon, in the evening, and at bedtime., Disp: 120 strip, Rfl: 11    pantoprazole (ProtoNix) 40 mg EC tablet, TAKE 1 TABLET BY MOUTH EVERY DAY, Disp: 90 tablet, Rfl: 1    predniSONE (Deltasone) 5 mg tablet, TAKE 1 TABLET BY MOUTH THREE TIMES A DAY, Disp: 90 tablet, Rfl: 0    risperiDONE (RisperDAL) 0.5 mg tablet, TAKE 1 TABLET BY MOUTH AT BEDTIME, Disp: 30 tablet, Rfl: 0    torsemide (Demadex) 10 mg tablet, TAKE 1 TABLET BY MOUTH ONCE DAILY, Disp: 30 tablet, Rfl: 2  Medical History:  Past Medical History:   Diagnosis Date    Acute upper respiratory infection, unspecified 03/04/2020     Acute URI    Acute upper respiratory infection, unspecified 09/30/2015    URTI (acute upper respiratory infection)    Arthritis     Body mass index (BMI) 23.0-23.9, adult 10/15/2021    BMI 23.0-23.9, adult    Body mass index (BMI) 33.0-33.9, adult 03/04/2020    BMI 33.0-33.9,adult    Cardiomegaly 08/27/2013    Left ventricular hypertrophy    Chronic kidney disease, stage 3 unspecified (CMS/HCC) 07/02/2013    Chronic kidney disease, stage III (moderate)    Disease of pericardium, unspecified 07/02/2013    Pericardial disease    Encounter for follow-up examination after completed treatment for conditions other than malignant neoplasm 10/06/2022    Hospital discharge follow-up    Generalized contraction of visual field, right eye 01/29/2015    Generalized contraction of visual field of right eye    Homonymous bilateral field defects, right side 04/29/2016    Homonymous bilateral field defects of right side    Hypertensive chronic kidney disease with stage 1 through stage 4 chronic kidney disease, or unspecified chronic kidney disease 07/02/2013    Nephrosclerosis    Laceration without foreign body, left foot, initial encounter 07/03/2018    Foot laceration, left, initial encounter    Migraine with aura, not intractable, without status migrainosus 10/24/2022    Ocular migraine    Other conditions influencing health status 07/02/2013    Chronic Glomerulonephritis In Diseases Classified Elsewhere    Other conditions influencing health status 07/02/2013    Progressive Familial Myoclonic Epilepsy    Other conditions influencing health status 07/02/2013    Protein S Deficiency    Other conditions influencing health status 05/22/2015    Familial Combined Hyperlipidemia    Other conditions influencing health status 10/24/2022    IDDM (insulin dependent diabetes mellitus)    Other conditions influencing health status 03/14/2022    Diabetes mellitus, insulin dependent (IDDM), uncontrolled    Other long term (current) drug  therapy 10/24/2022    Long-term use of Plaquenil    Personal history of diseases of the blood and blood-forming organs and certain disorders involving the immune mechanism 07/02/2013    History of thrombocytopenia    Personal history of diseases of the skin and subcutaneous tissue 08/11/2015    History of foot ulcer    Personal history of nephrotic syndrome 07/02/2013    History of nephrotic syndrome    Personal history of other diseases of the circulatory system 08/27/2013    History of sinus tachycardia    Personal history of other diseases of the nervous system and sense organs     History of cataract    Personal history of other diseases of the respiratory system     History of bronchitis    Personal history of other infectious and parasitic diseases 07/02/2013    History of hepatitis    Personal history of other specified conditions     History of shortness of breath    Personal history of other specified conditions 08/27/2013    History of edema    Puckering of macula, right eye 10/24/2022    ERM OD (epiretinal membrane, right eye)    Raynaud's syndrome without gangrene 07/02/2013    Raynaud's disease    Systemic lupus erythematosus, unspecified (CMS/[facility]) 07/24/2015    SLE (systemic lupus erythematosus)    Systemic lupus erythematosus, unspecified (CMS/[facility]) 07/24/2015    SLE (systemic lupus erythematosus)    Systemic lupus erythematosus, unspecified (CMS/[facility]) 07/24/2015    Systemic lupus    Type 2 diabetes mellitus with diabetic nephropathy (CMS/[facility]) 07/02/2013    Type 2 diabetes with nephropathy    Type 2 diabetes mellitus with mild nonproliferative diabetic retinopathy without macular edema, left eye (CMS/[facility]) 07/27/2015    Non-proliferative diabetic retinopathy, left eye    Type 2 diabetes mellitus with mild nonproliferative diabetic retinopathy without macular edema, unspecified eye (CMS/[facility]) 07/24/2015    Mild non proliferative diabetic retinopathy    Type 2 diabetes mellitus with proliferative diabetic  retinopathy without macular edema, right eye (CMS/[facility]) 07/27/2015    Proliferative diabetic retinopathy of right eye    Type 2 diabetes mellitus with proliferative diabetic retinopathy without macular edema, unspecified eye (CMS/[facility]) 07/24/2015    Diabetic proliferative retinopathy    Unspecified acute and subacute iridocyclitis 07/24/2015    Acute iritis, right eye    Unspecified open wound, left foot, sequela 07/03/2018    Wound, open, foot, left, sequela     Surgical History:  Past Surgical History:   Procedure Laterality Date    ANKLE SURGERY  01/29/2015    Ankle Surgery    CHOLECYSTECTOMY  01/29/2015    Cholecystectomy    CT GUIDED PERCUTANEOUS BIOPSY BONE DEEP  5/4/2021    CT GUIDED PERCUTANEOUS BIOPSY BONE DEEP 5/4/2021 [facility] CLINICAL LEGACY    EYE SURGERY  03/06/2015    Eye Surgery    FOOT SURGERY  01/29/2015    Foot Surgery    MR HEAD ANGIO WO IV CONTRAST  7/26/2013    MR HEAD ANGIO WO IV CONTRAST 7/26/2013 [facility] CLINICAL LEGACY    MR HEAD ANGIO WO IV CONTRAST  9/17/2021    MR HEAD ANGIO WO IV CONTRAST 9/17/2021 [facility]    MR HEAD ANGIO WO IV CONTRAST  3/25/2023    MR HEAD ANGIO WO IV CONTRAST [facility]    MR NECK ANGIO WO IV CONTRAST  7/26/2013    MR NECK ANGIO WO IV CONTRAST 7/26/2013 [facility] CLINICAL LEGACY    MR NECK ANGIO WO IV CONTRAST  9/17/2021    MR NECK ANGIO WO IV CONTRAST 9/17/2021 [facility]    MR NECK ANGIO WO IV CONTRAST  3/25/2023    MR NECK ANGIO WO IV CONTRAST [facility]    OTHER SURGICAL HISTORY  01/29/2015    Creation Of Pericardial Window    OTHER SURGICAL HISTORY  01/29/2015    Quadricepsplasty    TOTAL HIP ARTHROPLASTY  01/29/2015    Hip Replacement     Family History:  Family History   Problem Relation Name Age of Onset    Other (RENAL DISEASE) Mother          END STAGE    Other (CARDIAC DISORDER) Mother      Cataracts Mother      Stroke Mother      Diabetes Mother      Kidney disease Mother      Lupus Mother      Other (CARDIAC DISORDER) Father      COPD Father       "Glaucoma Father      Hypertension Father      Sleep apnea Father      Other (CARDIAC DISORDER) Sister      Depression Sister      Kidney disease Sister      Sickle cell trait Sister      Sleep apnea Daughter           Objective   Mental Status Exam:       Vitals:  There were no vitals filed for this visit.    Lab Review:   {Recent labs:19471::\"not applicable\"}        Talha Phelps MD    "

## 2023-11-01 ENCOUNTER — NURSING HOME VISIT (OUTPATIENT)
Dept: POST ACUTE CARE | Facility: EXTERNAL LOCATION | Age: 63
End: 2023-11-01

## 2023-11-01 ENCOUNTER — TELEMEDICINE (OUTPATIENT)
Dept: PHARMACY | Facility: HOSPITAL | Age: 63
End: 2023-11-01
Payer: MEDICARE

## 2023-11-01 DIAGNOSIS — M25.50 ARTHRALGIA, UNSPECIFIED JOINT: ICD-10-CM

## 2023-11-01 DIAGNOSIS — J44.9 ADVANCED COPD (MULTI): Primary | ICD-10-CM

## 2023-11-01 DIAGNOSIS — E27.40 ADRENAL INSUFFICIENCY (MULTI): ICD-10-CM

## 2023-11-01 DIAGNOSIS — I48.91 ATRIAL FIBRILLATION, UNSPECIFIED TYPE (MULTI): ICD-10-CM

## 2023-11-01 DIAGNOSIS — E16.2 HYPOGLYCEMIA: ICD-10-CM

## 2023-11-01 DIAGNOSIS — G93.41 METABOLIC ENCEPHALOPATHY: ICD-10-CM

## 2023-11-01 DIAGNOSIS — E11.42 DM TYPE 2 WITH DIABETIC PERIPHERAL NEUROPATHY (MULTI): Primary | ICD-10-CM

## 2023-11-01 DIAGNOSIS — R73.9 HYPERGLYCEMIA: ICD-10-CM

## 2023-11-01 DIAGNOSIS — N18.31 CKD STAGE G3A/A2, GFR 45-59 AND ALBUMIN CREATININE RATIO 30-299 MG/G (MULTI): ICD-10-CM

## 2023-11-01 DIAGNOSIS — Z79.899 MEDICATION MANAGEMENT: ICD-10-CM

## 2023-11-01 DIAGNOSIS — M32.9 SYSTEMIC LUPUS ERYTHEMATOSUS, UNSPECIFIED SLE TYPE, UNSPECIFIED ORGAN INVOLVEMENT STATUS (MULTI): Chronic | ICD-10-CM

## 2023-11-01 DIAGNOSIS — K52.9 CHRONIC DIARRHEA: ICD-10-CM

## 2023-11-01 DIAGNOSIS — E78.5 HYPERLIPIDEMIA, UNSPECIFIED HYPERLIPIDEMIA TYPE: ICD-10-CM

## 2023-11-01 PROCEDURE — 99309 SBSQ NF CARE MODERATE MDM 30: CPT | Performed by: STUDENT IN AN ORGANIZED HEALTH CARE EDUCATION/TRAINING PROGRAM

## 2023-11-01 RX ORDER — FLUDROCORTISONE ACETATE 0.1 MG/1
0.1 TABLET ORAL EVERY OTHER DAY
Qty: 45 TABLET | Refills: 0 | Status: SHIPPED | OUTPATIENT
Start: 2023-11-01 | End: 2023-11-27 | Stop reason: HOSPADM

## 2023-11-01 NOTE — LETTER
Patient: Bing Holliday  : 1960    Encounter Date: 2023    Subjective  Patient ID: Bing Holliday is a 62 y.o. female.    Patient seen and examined at bedside.  She reports that she is doing well and was overall improving, however states that her diarrhea has returned.  C. difficile was noted to be negative on repeat screenings.  She is not getting much relief from Imodium.  Regards no additional issues or concerns.  Still feeling weak secondary to the diarrhea that she is experiencing.        Review of Systems   Constitutional: Negative.    HENT: Negative.     Respiratory: Negative.     Cardiovascular: Negative.    Gastrointestinal:  Positive for diarrhea.   Musculoskeletal: Negative.    Neurological: Negative.    Psychiatric/Behavioral: Negative.         ObjectiveVitals Reviewed via facility EMR   Physical Exam  Constitutional:       General: She is not in acute distress.     Appearance: She is not ill-appearing.   Eyes:      Pupils: Pupils are equal, round, and reactive to light.   Cardiovascular:      Rate and Rhythm: Normal rate and regular rhythm.      Pulses: Normal pulses.      Heart sounds: No murmur heard.  Pulmonary:      Effort: No respiratory distress.      Breath sounds: No wheezing.   Abdominal:      General: Abdomen is flat. Bowel sounds are normal. There is distension.      Comments: Pain in abdomen   Musculoskeletal:      Right lower leg: No edema.      Left lower leg: No edema.   Skin:     General: Skin is warm and dry.   Neurological:      Mental Status: She is alert. Mental status is at baseline.      Cranial Nerves: No cranial nerve deficit.      Motor: No weakness.   Psychiatric:         Mood and Affect: Mood normal.         Behavior: Behavior normal.         Assessment/Plan  Diagnoses and all orders for this visit:  DM type 2 with diabetic peripheral neuropathy (CMS/[facility])  CKD stage G3a/A2, GFR 45-59 and albumin creatinine ratio  mg/g (CMS/[facility])  Metabolic  encephalopathy  Systemic lupus erythematosus, unspecified SLE type, unspecified organ involvement status (CMS/HCC)  Chronic diarrhea  Patient seen and examined.  Notable diarrhea, will trial short course of Lomotil discussed side effect profile with patient.  Order placed.  Continue to monitor diarrhea, may recheck C. difficile if this is persistent.  Monitor labs, check CBC as well as CMP to ensure no leukocytosis as well as worsening JED.  She does endorse no dizziness at this time, so do not suspect dehydration or orthostasis.  We will closely monitor.  Continue PT OT if patient does tolerate    Ayaan Koehler DO         Electronically Signed By: Ayaan Koehler DO   11/2/23  9:18 PM

## 2023-11-01 NOTE — PROGRESS NOTES
Pharmacist Clinic: [facility] and Hospital Discharge    Bing Holliday was referred to the Clinical Pharmacy Team for financial assistance with Eliquis and Trelegy; as well as hospital follow-up. Now being followed for CGM assistance and diabetes monitoring.    Referring Provider: Claudio Hood DO    Interval history:  Patient currently at [facility]; was nauseous and having diarrhea - in rehab  Had a few lows in the hospital  No lows while at rehab   Patient did try cornstarch but didn't think it worked to control the sugars      T2DM  Patient states that the facility is managing diabetes at this time - no change from pharmacy team at this time.  Has endocrine appt scheduled for 11.15.23      COPD  Patient reports she is still using Trelegy inhaler 1 puff daily  No questions or concerns; requests new script for refills  No increased cough, sputum volume or purulence   Good adherence      Patient asked if Cellcept could be filled with the patient assistance program but it is not on our formulary. Patient stated she would like to keep it at [facility] then.      Time spent with pt: Total length of time 10(minutes) of the encounter and more than 50% was spent counseling the patient.    Clinical Pharmacist follow up: patient to call and schedule when out of rehab  Will need to follow up in July at the latest to review for updated  PAP.       Nesha Foley, Marina, JACQUELINE  Clinical Pharmacist  Pharmacy Services  [phone]    Continue all meds under the continuation of care with the referring provider and clinical pharmacy team.    Verbal consent to manage patient's drug therapy was obtained from patient. They were informed they may decline to participate or withdraw from participation in pharmacy services at any time.

## 2023-11-02 ENCOUNTER — DOCUMENTATION (OUTPATIENT)
Dept: PRIMARY CARE | Facility: CLINIC | Age: 63
End: 2023-11-02
Payer: MEDICARE

## 2023-11-02 DIAGNOSIS — F32.0 CURRENT MILD EPISODE OF MAJOR DEPRESSIVE DISORDER, UNSPECIFIED WHETHER RECURRENT (CMS-HCC): Primary | ICD-10-CM

## 2023-11-02 PROBLEM — K59.1 FUNCTIONAL DIARRHEA: Status: ACTIVE | Noted: 2023-11-02

## 2023-11-02 PROCEDURE — G2214 INIT/SUB PSYCH CARE M 1ST 30: HCPCS | Performed by: FAMILY MEDICINE

## 2023-11-02 ASSESSMENT — ENCOUNTER SYMPTOMS
MUSCULOSKELETAL NEGATIVE: 1
DIARRHEA: 1
CARDIOVASCULAR NEGATIVE: 1
CONSTITUTIONAL NEGATIVE: 1
RESPIRATORY NEGATIVE: 1
NEUROLOGICAL NEGATIVE: 1
PSYCHIATRIC NEGATIVE: 1

## 2023-11-03 ENCOUNTER — NURSING HOME VISIT (OUTPATIENT)
Dept: POST ACUTE CARE | Facility: EXTERNAL LOCATION | Age: 63
End: 2023-11-03
Payer: MEDICARE

## 2023-11-03 DIAGNOSIS — E11.42 DM TYPE 2 WITH DIABETIC PERIPHERAL NEUROPATHY (MULTI): ICD-10-CM

## 2023-11-03 DIAGNOSIS — K59.1 FUNCTIONAL DIARRHEA: ICD-10-CM

## 2023-11-03 DIAGNOSIS — I48.91 ATRIAL FIBRILLATION, UNSPECIFIED TYPE (MULTI): Primary | ICD-10-CM

## 2023-11-03 DIAGNOSIS — N18.31 CKD STAGE G3A/A2, GFR 45-59 AND ALBUMIN CREATININE RATIO 30-299 MG/G (MULTI): ICD-10-CM

## 2023-11-03 DIAGNOSIS — R10.9 ABDOMINAL CRAMPING: ICD-10-CM

## 2023-11-03 PROCEDURE — 99308 SBSQ NF CARE LOW MDM 20: CPT | Performed by: STUDENT IN AN ORGANIZED HEALTH CARE EDUCATION/TRAINING PROGRAM

## 2023-11-03 NOTE — PROGRESS NOTES
Subjective   Patient ID: Bing Holliday is a 62 y.o. female.    Patient seen and examined at bedside.  She reports that she is doing well and was overall improving, however states that her diarrhea has returned.  C. difficile was noted to be negative on repeat screenings.  She is not getting much relief from Imodium.  Regards no additional issues or concerns.  Still feeling weak secondary to the diarrhea that she is experiencing.        Review of Systems   Constitutional: Negative.    HENT: Negative.     Respiratory: Negative.     Cardiovascular: Negative.    Gastrointestinal:  Positive for diarrhea.   Musculoskeletal: Negative.    Neurological: Negative.    Psychiatric/Behavioral: Negative.         Objective Vitals Reviewed via facility EMR   Physical Exam  Constitutional:       General: She is not in acute distress.     Appearance: She is not ill-appearing.   Eyes:      Pupils: Pupils are equal, round, and reactive to light.   Cardiovascular:      Rate and Rhythm: Normal rate and regular rhythm.      Pulses: Normal pulses.      Heart sounds: No murmur heard.  Pulmonary:      Effort: No respiratory distress.      Breath sounds: No wheezing.   Abdominal:      General: Abdomen is flat. Bowel sounds are normal. There is distension.      Comments: Pain in abdomen   Musculoskeletal:      Right lower leg: No edema.      Left lower leg: No edema.   Skin:     General: Skin is warm and dry.   Neurological:      Mental Status: She is alert. Mental status is at baseline.      Cranial Nerves: No cranial nerve deficit.      Motor: No weakness.   Psychiatric:         Mood and Affect: Mood normal.         Behavior: Behavior normal.         Assessment/Plan   Diagnoses and all orders for this visit:  DM type 2 with diabetic peripheral neuropathy (CMS/HCC)  CKD stage G3a/A2, GFR 45-59 and albumin creatinine ratio  mg/g (CMS/[facility])  Metabolic encephalopathy  Systemic lupus erythematosus, unspecified SLE type, unspecified organ  involvement status (CMS/HCC)  Chronic diarrhea  Patient seen and examined.  Notable diarrhea, will trial short course of Lomotil discussed side effect profile with patient.  Order placed.  Continue to monitor diarrhea, may recheck C. difficile if this is persistent.  Monitor labs, check CBC as well as CMP to ensure no leukocytosis as well as worsening JED.  She does endorse no dizziness at this time, so do not suspect dehydration or orthostasis.  We will closely monitor.  Continue PT OT if patient does tolerate    Ayaan Miniaci DO

## 2023-11-03 NOTE — LETTER
Patient: Bing Holliday  : 1960    Encounter Date: 2023    Subjective  Patient ID: Bing Holliday is a 62 y.o. female.    Patient seen and evaluated at bedside.  She states that she is still having diarrhea, recently lomitil ordered for the patient, however she has yet to receive this.  Other than her diarrhea, she states that overall she is doing much better from a therapy standpoint.  Continues to strengthen and getting closer back to her normal self.  Regards no additional issues or concerns at this time.        Review of Systems   Constitutional: Negative.    HENT: Negative.     Respiratory: Negative.     Cardiovascular: Negative.    Gastrointestinal:  Positive for abdominal pain and diarrhea.   Musculoskeletal: Negative.    Neurological: Negative.    Psychiatric/Behavioral: Negative.         ObjectiveVitals Reviewed via facility EMR   Physical Exam  Constitutional:       General: She is not in acute distress.     Appearance: She is not ill-appearing.   Eyes:      Pupils: Pupils are equal, round, and reactive to light.   Cardiovascular:      Rate and Rhythm: Normal rate and regular rhythm.      Pulses: Normal pulses.      Heart sounds: No murmur heard.  Pulmonary:      Effort: No respiratory distress.      Breath sounds: No wheezing.   Abdominal:      General: Abdomen is flat. Bowel sounds are normal. There is no distension.   Musculoskeletal:      Right lower leg: No edema.      Left lower leg: No edema.   Skin:     General: Skin is warm and dry.   Neurological:      Mental Status: She is alert. Mental status is at baseline.      Cranial Nerves: No cranial nerve deficit.      Motor: No weakness.   Psychiatric:         Mood and Affect: Mood normal.         Behavior: Behavior normal.         Assessment/Plan  Diagnoses and all orders for this visit:  Atrial fibrillation, unspecified type (CMS/HCC)  DM type 2 with diabetic peripheral neuropathy (CMS/HCC)  Functional diarrhea  Abdominal  cramping  CKD stage G3a/A2, GFR 45-59 and albumin creatinine ratio  mg/g (CMS/[facility])    Patient seen and examined, monitor diarrhea after initiation of Lomotil.  This is worked in the past for the patient so suspect that this will be successful.  Infectious etiologies have been ruled out as C. difficile came back negative, consider recheck if diarrhea persists.  Continue supportive care.  PT OT monitor electrolytes and will need to optimize these    Ayaan Koehler DO       Electronically Signed By: Ayaan Koehler DO   11/5/23  9:42 PM

## 2023-11-05 ASSESSMENT — ENCOUNTER SYMPTOMS
CONSTITUTIONAL NEGATIVE: 1
DIARRHEA: 1
MUSCULOSKELETAL NEGATIVE: 1
ABDOMINAL PAIN: 1
NEUROLOGICAL NEGATIVE: 1
CARDIOVASCULAR NEGATIVE: 1
RESPIRATORY NEGATIVE: 1
PSYCHIATRIC NEGATIVE: 1

## 2023-11-06 NOTE — PROGRESS NOTES
Subjective   Patient ID: Bing Holliday is a 62 y.o. female.    Patient seen and evaluated at bedside.  She states that she is still having diarrhea, recently lomitil ordered for the patient, however she has yet to receive this.  Other than her diarrhea, she states that overall she is doing much better from a therapy standpoint.  Continues to strengthen and getting closer back to her normal self.  Regards no additional issues or concerns at this time.        Review of Systems   Constitutional: Negative.    HENT: Negative.     Respiratory: Negative.     Cardiovascular: Negative.    Gastrointestinal:  Positive for abdominal pain and diarrhea.   Musculoskeletal: Negative.    Neurological: Negative.    Psychiatric/Behavioral: Negative.         Objective Vitals Reviewed via facility EMR   Physical Exam  Constitutional:       General: She is not in acute distress.     Appearance: She is not ill-appearing.   Eyes:      Pupils: Pupils are equal, round, and reactive to light.   Cardiovascular:      Rate and Rhythm: Normal rate and regular rhythm.      Pulses: Normal pulses.      Heart sounds: No murmur heard.  Pulmonary:      Effort: No respiratory distress.      Breath sounds: No wheezing.   Abdominal:      General: Abdomen is flat. Bowel sounds are normal. There is no distension.   Musculoskeletal:      Right lower leg: No edema.      Left lower leg: No edema.   Skin:     General: Skin is warm and dry.   Neurological:      Mental Status: She is alert. Mental status is at baseline.      Cranial Nerves: No cranial nerve deficit.      Motor: No weakness.   Psychiatric:         Mood and Affect: Mood normal.         Behavior: Behavior normal.         Assessment/Plan   Diagnoses and all orders for this visit:  Atrial fibrillation, unspecified type (CMS/HCC)  DM type 2 with diabetic peripheral neuropathy (CMS/HCC)  Functional diarrhea  Abdominal cramping  CKD stage G3a/A2, GFR 45-59 and albumin creatinine ratio  mg/g  (CMS/[facility])    Patient seen and examined, monitor diarrhea after initiation of Lomotil.  This is worked in the past for the patient so suspect that this will be successful.  Infectious etiologies have been ruled out as C. difficile came back negative, consider recheck if diarrhea persists.  Continue supportive care.  PT OT monitor electrolytes and will need to optimize these    Ayaan Koehler DO

## 2023-11-08 ENCOUNTER — NURSING HOME VISIT (OUTPATIENT)
Dept: POST ACUTE CARE | Facility: EXTERNAL LOCATION | Age: 63
End: 2023-11-08
Payer: MEDICARE

## 2023-11-08 DIAGNOSIS — N18.31 CKD STAGE G3A/A2, GFR 45-59 AND ALBUMIN CREATININE RATIO 30-299 MG/G (MULTI): ICD-10-CM

## 2023-11-08 DIAGNOSIS — I27.20 PULMONARY HYPERTENSION (MULTI): Primary | ICD-10-CM

## 2023-11-08 DIAGNOSIS — I42.9 CARDIOMYOPATHY, UNSPECIFIED TYPE (MULTI): ICD-10-CM

## 2023-11-08 DIAGNOSIS — K21.9 GASTROESOPHAGEAL REFLUX DISEASE WITHOUT ESOPHAGITIS: ICD-10-CM

## 2023-11-08 DIAGNOSIS — M81.0 OSTEOPOROSIS, UNSPECIFIED OSTEOPOROSIS TYPE, UNSPECIFIED PATHOLOGICAL FRACTURE PRESENCE: ICD-10-CM

## 2023-11-08 DIAGNOSIS — K59.1 FUNCTIONAL DIARRHEA: ICD-10-CM

## 2023-11-08 PROCEDURE — 99308 SBSQ NF CARE LOW MDM 20: CPT | Performed by: STUDENT IN AN ORGANIZED HEALTH CARE EDUCATION/TRAINING PROGRAM

## 2023-11-08 NOTE — LETTER
Patient: Bing Holliday  : 1960    Encounter Date: 2023    Subjective  Patient ID: Bing Holliday is a 62 y.o. female.    Patient seen and examined.  She reports that she is overall doing well without any issues.  Regards that for the lomotil did not help with her diarrhea however it is still present at times.  States no acute issues or concerns otherwise.  Regards that she does not have a GI doctor.  States no additional issues.        Review of Systems   Constitutional: Negative.    HENT: Negative.     Respiratory: Negative.     Cardiovascular: Negative.    Gastrointestinal:  Positive for diarrhea.        Improved diahhrea   Musculoskeletal: Negative.    Neurological: Negative.    Psychiatric/Behavioral: Negative.         ObjectiveVitals Reviewed via facility EMR   Physical Exam  Constitutional:       General: She is not in acute distress.     Appearance: She is not ill-appearing.      Comments: pleasant   Eyes:      Pupils: Pupils are equal, round, and reactive to light.   Cardiovascular:      Rate and Rhythm: Normal rate and regular rhythm.      Pulses: Normal pulses.      Heart sounds: No murmur heard.  Pulmonary:      Effort: No respiratory distress.      Breath sounds: No wheezing.   Abdominal:      General: Abdomen is flat. Bowel sounds are normal. There is no distension.   Musculoskeletal:      Right lower leg: No edema.      Left lower leg: No edema.   Skin:     General: Skin is warm and dry.   Neurological:      Mental Status: She is alert. Mental status is at baseline.      Cranial Nerves: No cranial nerve deficit.      Motor: No weakness.   Psychiatric:         Mood and Affect: Mood normal.         Behavior: Behavior normal.         Assessment/Plan  Diagnoses and all orders for this visit:  Pulmonary hypertension (CMS/HCC)  Cardiomyopathy, unspecified type (CMS/HCC)  Osteoporosis, unspecified osteoporosis type, unspecified pathological fracture presence  Functional  diarrhea  Gastroesophageal reflux disease without esophagitis  CKD stage G3a/A2, GFR 45-59 and albumin creatinine ratio  mg/g (CMS/[facility])    Patient overall improving, continue Lomotil medication due to much improvement.  Continue PT OT, discharge planning underway.  Continue supportive care.  Recommended follow-up with GI on discharge, referral placed.    Ayaan Koehler DO       Electronically Signed By: Ayaan Koehler DO   11/9/23  8:20 PM

## 2023-11-09 ASSESSMENT — ENCOUNTER SYMPTOMS
DIARRHEA: 1
RESPIRATORY NEGATIVE: 1
PSYCHIATRIC NEGATIVE: 1
CONSTITUTIONAL NEGATIVE: 1
NEUROLOGICAL NEGATIVE: 1
CARDIOVASCULAR NEGATIVE: 1
MUSCULOSKELETAL NEGATIVE: 1

## 2023-11-10 NOTE — PROGRESS NOTES
Subjective   Patient ID: Bing Holliday is a 62 y.o. female.    Patient seen and examined.  She reports that she is overall doing well without any issues.  Regards that for the lomotil did not help with her diarrhea however it is still present at times.  States no acute issues or concerns otherwise.  Regards that she does not have a GI doctor.  States no additional issues.        Review of Systems   Constitutional: Negative.    HENT: Negative.     Respiratory: Negative.     Cardiovascular: Negative.    Gastrointestinal:  Positive for diarrhea.        Improved diahhrea   Musculoskeletal: Negative.    Neurological: Negative.    Psychiatric/Behavioral: Negative.         Objective Vitals Reviewed via facility EMR   Physical Exam  Constitutional:       General: She is not in acute distress.     Appearance: She is not ill-appearing.      Comments: pleasant   Eyes:      Pupils: Pupils are equal, round, and reactive to light.   Cardiovascular:      Rate and Rhythm: Normal rate and regular rhythm.      Pulses: Normal pulses.      Heart sounds: No murmur heard.  Pulmonary:      Effort: No respiratory distress.      Breath sounds: No wheezing.   Abdominal:      General: Abdomen is flat. Bowel sounds are normal. There is no distension.   Musculoskeletal:      Right lower leg: No edema.      Left lower leg: No edema.   Skin:     General: Skin is warm and dry.   Neurological:      Mental Status: She is alert. Mental status is at baseline.      Cranial Nerves: No cranial nerve deficit.      Motor: No weakness.   Psychiatric:         Mood and Affect: Mood normal.         Behavior: Behavior normal.         Assessment/Plan   Diagnoses and all orders for this visit:  Pulmonary hypertension (CMS/HCC)  Cardiomyopathy, unspecified type (CMS/HCC)  Osteoporosis, unspecified osteoporosis type, unspecified pathological fracture presence  Functional diarrhea  Gastroesophageal reflux disease without esophagitis  CKD stage G3a/A2, GFR 45-59  and albumin creatinine ratio  mg/g (CMS/HCC)    Patient overall improving, continue Lomotil medication due to much improvement.  Continue PT OT, discharge planning underway.  Continue supportive care.  Recommended follow-up with GI on discharge, referral placed.    Ayaan Koehler DO

## 2023-11-11 ENCOUNTER — TELEPHONE (OUTPATIENT)
Dept: PRIMARY CARE | Facility: CLINIC | Age: 63
End: 2023-11-11
Payer: MEDICARE

## 2023-11-11 DIAGNOSIS — K59.1 FUNCTIONAL DIARRHEA: Primary | ICD-10-CM

## 2023-11-11 RX ORDER — DIPHENOXYLATE HYDROCHLORIDE AND ATROPINE SULFATE 2.5; .025 MG/1; MG/1
TABLET ORAL
Qty: 14 TABLET | Refills: 0 | Status: SHIPPED | OUTPATIENT
Start: 2023-11-11 | End: 2023-11-27 | Stop reason: HOSPADM

## 2023-11-11 NOTE — TELEPHONE ENCOUNTER
We will note from Saturday, November 11 at 3:00  The visiting home nurse called requesting something to help improve the diarrhea on this patient I did review the nursing home physician's note that said that her C. difficile cultures x2 were negative and they switched her from Imodium to Lomotil last week which did improve it hence will call in 7 days of Lomotil at 1 twice a day and then call the office to see if they want a put her on a chronic medicine for the diarrhea at this point so henceforth Lomotil 1 twice a day dispense 14 was called in to the local pharmacy at her request

## 2023-11-12 PROCEDURE — G0179 MD RECERTIFICATION HHA PT: HCPCS | Performed by: FAMILY MEDICINE

## 2023-11-13 ENCOUNTER — PATIENT OUTREACH (OUTPATIENT)
Dept: PRIMARY CARE | Facility: CLINIC | Age: 63
End: 2023-11-13
Payer: MEDICARE

## 2023-11-13 DIAGNOSIS — M32.9 LUPUS (MULTI): ICD-10-CM

## 2023-11-13 DIAGNOSIS — R41.0 DISORIENTATION: ICD-10-CM

## 2023-11-13 DIAGNOSIS — E11.65 UNCONTROLLED DIABETES MELLITUS WITH HYPERGLYCEMIA, WITHOUT LONG-TERM CURRENT USE OF INSULIN (MULTI): ICD-10-CM

## 2023-11-13 PROCEDURE — 99490 CHRNC CARE MGMT STAFF 1ST 20: CPT | Performed by: FAMILY MEDICINE

## 2023-11-13 NOTE — PROGRESS NOTES
Discharge facility: [facility]  Discharge diagnosis: Disorientation, UTI (urinary tract infection)   Admission date: 10/25/23  Discharge date: 11/10/23    PCP Appointment Date:   Patient is NOT scheduled for a hospital follow up due to no available appointments. Tasked to the office. If follow up is scheduled within 14 days of discharge, visit is TCM billable. Need appointment by 11/23  Specialist Appointment Date:   -Neurosurgery Dr. Claudio Kearney: 11/28  -Endocrinology : 11/15  -Podiatry (Dr Silveira) : Appointment 11/13, patient request cancellation. Made aware to reschedule. This nurse called and cancelled appointment and request call to patient to reschedule.  Hospital Encounter and Summary: not available at this time   See Discharge assessment below for further details     Engagement  Call Start Time: 1022 (11/13/2023 10:17 AM)    Medications  Medications reviewed with patient/caregiver?: No (Await D/C papers from [place]) (11/13/2023 10:17 AM)  Is the patient having any side effects they believe may be caused by any medication additions or changes?: No (11/13/2023 10:17 AM)  Does the patient have all medications ordered at discharge?: Yes (11/13/2023 10:17 AM)  Care Management Interventions: No intervention needed (11/13/2023 10:17 AM)  Is the patient taking all medications as directed (includes completed medication regime)?: Yes (11/13/2023 10:17 AM)  Care Management Interventions: Provided patient education; Notified provider (11/13/2023 10:17 AM)  Medication Comments: Patient had Diarrhea, called On call provider 11/10 who ordered Rx Lomotil. PAtient states she has improved. (11/13/2023 10:17 AM)    Appointments  Does the patient have a primary care provider?: Yes (11/13/2023 10:17 AM)  Care Management Interventions: Educated patient on importance of making appointment; Advised patient to make appointment (11/13/2023 10:17 AM)  Has the patient kept scheduled appointments due by  today?: Yes (11/13/2023 10:17 AM)  Care Management Interventions: Educated on importance of keeping appointment; Advised to schedule with specialist (11/13/2023 10:17 AM)  Follow Up Tasks: Appointments (Needs PCP appointment, tasked to office as no appointments available. Patient to see Podiatry today in [place], [facility] she wont make appointment. Rquest this nurse to cancel appointment. Call placed to [facility]: [phone]) (11/13/2023 10:17 AM)    Self Management  What is the home health agency?: [facility] (11/13/2023 10:17 AM)  Has home health visited the patient within 72 hours of discharge?: Yes (Saturday) (11/13/2023 10:17 AM)  What Durable Medical Equipment (DME) was ordered?: N/A (11/13/2023 10:17 AM)  Care Management Interventions: Notified PCP/provider (11/13/2023 10:17 AM)    Patient Teaching  Does the patient have access to their discharge instructions?: Yes (11/13/2023 10:17 AM)  Care Management Interventions: Educated on MyChart (11/13/2023 10:17 AM)  What is the patient's perception of their health status since discharge?: Improving (11/13/2023 10:17 AM)  Is the patient/caregiver able to teach back the hierarchy of who to call/visit for symptoms/problems? PCP, Specialist, Home Health nurse, Urgent Care, ED, 911: Yes (11/13/2023 10:17 AM)    Wrap Up  Is the patient/caregiver familiar with Advance Care Planning?: Yes (11/13/2023 10:17 AM)  Would the patient like more information on Advance Care Planning?: No (11/13/2023 10:17 AM)  Call End Time: 1026 (11/13/2023 10:17 AM)

## 2023-11-15 ENCOUNTER — TELEPHONE (OUTPATIENT)
Dept: PRIMARY CARE | Facility: CLINIC | Age: 63
End: 2023-11-15
Payer: MEDICARE

## 2023-11-15 ENCOUNTER — OFFICE VISIT (OUTPATIENT)
Dept: ENDOCRINOLOGY | Age: 63
End: 2023-11-15

## 2023-11-15 VITALS
BODY MASS INDEX: 28.56 KG/M2 | SYSTOLIC BLOOD PRESSURE: 100 MMHG | HEIGHT: 71 IN | DIASTOLIC BLOOD PRESSURE: 65 MMHG | HEART RATE: 101 BPM | WEIGHT: 204 LBS

## 2023-11-15 DIAGNOSIS — Z79.52 LONG TERM SYSTEMIC STEROID USER: ICD-10-CM

## 2023-11-15 DIAGNOSIS — E88.819 INSULIN RESISTANCE: ICD-10-CM

## 2023-11-15 DIAGNOSIS — E16.2 HYPOGLYCEMIA: ICD-10-CM

## 2023-11-15 DIAGNOSIS — N18.30 STAGE 3 CHRONIC KIDNEY DISEASE, UNSPECIFIED WHETHER STAGE 3A OR 3B CKD (HCC): ICD-10-CM

## 2023-11-15 DIAGNOSIS — E16.2 HYPOGLYCEMIA: Primary | ICD-10-CM

## 2023-11-15 DIAGNOSIS — E03.9 ACQUIRED HYPOTHYROIDISM: ICD-10-CM

## 2023-11-15 LAB
CHP ED QC CHECK: NORMAL
GLUCOSE BLD-MCNC: 95 MG/DL
HBA1C MFR BLD: 5.4 %
T4 FREE SERPL-MCNC: 1.11 NG/DL (ref 0.84–1.68)
TSH SERPL-MCNC: 1.41 UIU/ML (ref 0.44–3.86)

## 2023-11-15 RX ORDER — ATORVASTATIN CALCIUM 40 MG/1
40 TABLET, FILM COATED ORAL DAILY
COMMUNITY
Start: 2023-10-11

## 2023-11-15 RX ORDER — HYDROXYCHLOROQUINE SULFATE 200 MG/1
TABLET, FILM COATED ORAL
COMMUNITY
Start: 2004-02-02

## 2023-11-15 RX ORDER — FLUTICASONE FUROATE, UMECLIDINIUM BROMIDE AND VILANTEROL TRIFENATATE 200; 62.5; 25 UG/1; UG/1; UG/1
POWDER RESPIRATORY (INHALATION)
COMMUNITY
Start: 2023-10-14

## 2023-11-15 RX ORDER — HYDROCHLOROTHIAZIDE 25 MG/1
25 TABLET ORAL DAILY
COMMUNITY

## 2023-11-15 RX ORDER — LOPERAMIDE HYDROCHLORIDE 2 MG/1
CAPSULE ORAL
COMMUNITY
Start: 2023-07-27 | End: 2024-07-26

## 2023-11-15 RX ORDER — APIXABAN 5 MG/1
TABLET, FILM COATED ORAL
COMMUNITY
Start: 2023-09-06

## 2023-11-15 RX ORDER — CARVEDILOL 25 MG/1
25 TABLET ORAL 2 TIMES DAILY WITH MEALS
COMMUNITY

## 2023-11-15 RX ORDER — DIPHENOXYLATE HYDROCHLORIDE AND ATROPINE SULFATE 2.5; .025 MG/1; MG/1
TABLET ORAL
COMMUNITY
Start: 2023-11-11

## 2023-11-15 RX ORDER — PREDNISONE 5 MG/1
5 TABLET ORAL 3 TIMES DAILY
COMMUNITY
Start: 2023-09-14

## 2023-11-15 RX ORDER — PANTOPRAZOLE SODIUM 40 MG/1
1 TABLET, DELAYED RELEASE ORAL DAILY
COMMUNITY
Start: 2023-10-23

## 2023-11-15 RX ORDER — SENNOSIDES 8.6 MG
CAPSULE ORAL
COMMUNITY
Start: 2004-02-02

## 2023-11-15 RX ORDER — MYCOPHENOLATE MOFETIL 500 MG/1
TABLET ORAL
COMMUNITY
Start: 2023-09-30

## 2023-11-15 RX ORDER — FLUDROCORTISONE ACETATE 0.1 MG/1
TABLET ORAL
COMMUNITY
Start: 2023-10-03

## 2023-11-15 RX ORDER — TORSEMIDE 10 MG/1
10 TABLET ORAL DAILY
COMMUNITY
Start: 2023-09-13

## 2023-11-15 RX ORDER — FOLIC ACID 1 MG/1
TABLET ORAL
COMMUNITY
Start: 2023-10-04

## 2023-11-15 RX ORDER — CELECOXIB 100 MG/1
CAPSULE ORAL
COMMUNITY
Start: 2004-02-02

## 2023-11-15 RX ORDER — AMLODIPINE BESYLATE 2.5 MG/1
2.5 TABLET ORAL DAILY
COMMUNITY
Start: 2022-02-07

## 2023-11-15 RX ORDER — RISPERIDONE 0.5 MG/1
0.5 TABLET ORAL NIGHTLY
Qty: 30 TABLET | Refills: 11 | COMMUNITY
Start: 2023-07-26 | End: 2024-07-25

## 2023-11-15 RX ORDER — PREDNISOLONE ACETATE 10 MG/ML
1 SUSPENSION/ DROPS OPHTHALMIC 4 TIMES DAILY
COMMUNITY
Start: 2011-03-09

## 2023-11-15 RX ORDER — PREDNISONE 10 MG/1
TABLET ORAL
Qty: 50 TABLET | Refills: 3 | Status: SHIPPED | OUTPATIENT
Start: 2023-11-15

## 2023-11-15 NOTE — PROGRESS NOTES
(CELEBREX) 100 MG capsule, Take one(1) tablet daily. , Disp: , Rfl:     diphenoxylate-atropine (LOMOTIL) 2.5-0.025 MG per tablet, , Disp: , Rfl:     fludrocortisone (FLORINEF) 0.1 MG tablet, , Disp: , Rfl:     TRELEGY ELLIPTA 200-62.5-25 MCG/ACT AEPB inhaler, , Disp: , Rfl:     folic acid (FOLVITE) 1 MG tablet, , Disp: , Rfl:     hydroCHLOROthiazide (HYDRODIURIL) 25 MG tablet, Take 1 tablet by mouth daily, Disp: , Rfl:     hydroxychloroquine (PLAQUENIL) 200 MG tablet, Take one(1) tablet two(2) times daily. , Disp: , Rfl:     insulin NPH (HUMULIN N;NOVOLIN N) 100 UNIT/ML injection vial, Inject into the skin, Disp: , Rfl:     loperamide (IMODIUM) 2 MG capsule, TAKE 1 CAPSULE BY MOUTH THREE TIMES A DAY WITH A MEAL FOR LOOSE STOOLS, Disp: , Rfl:     LUTEIN PO, Take 1 tablet by mouth daily, Disp: , Rfl:     MANGANESE SULFATE IV, Take 1 tablet by mouth 2 times daily, Disp: , Rfl:     mycophenolate (CELLCEPT) 500 MG tablet, , Disp: , Rfl:     prednisoLONE acetate (PRED FORTE) 1 % ophthalmic suspension, Apply 1 drop to eye 4 times daily, Disp: , Rfl:     predniSONE (DELTASONE) 5 MG tablet, Take 1 tablet by mouth 3 times daily, Disp: , Rfl:     risperiDONE (RISPERDAL) 0.5 MG tablet, Take 1 tablet by mouth nightly, Disp: 30 tablet, Rfl: 11    torsemide (DEMADEX) 10 MG tablet, Take 1 tablet by mouth daily, Disp: , Rfl:     pantoprazole (PROTONIX) 40 MG tablet, Take 1 tablet by mouth daily, Disp: , Rfl:     metoprolol (LOPRESSOR) 50 MG tablet, TAKE 1 AND 1/2 TABLETS BY MOUTH TWICE DAILY, Disp: 270, Rfl: 3    NIFEdipine (AFEDITAB CR) 30 MG CR tablet, TAKE 1 TABLET BY MOUTH ONE TIME DAILY (PLEASE REORDER 2 BUSINESS DAYS IN ADVANCE), Disp: 60, Rfl: 1    aspirin EC 81 MG EC tablet, TAKE 1 TABLET BY MOUTH ONE TIME DAILY TO PREVENT HEART ATTACK AND STROKE, Disp: 120, Rfl: 3    furosemide (LASIX) 40 MG tablet, TAKE 1 TABLET BY MOUTH ONE TIME DAILY, Disp: 90, Rfl: 1    Insulin Syringe-Needle U-100 31G X 5/16\" 0.5 ML MISC, USE AS DIRECTED

## 2023-11-15 NOTE — TELEPHONE ENCOUNTER
Mildred, physical therapist with [facility] is calling to give an update on this patient of Dr Hood's. After she was re hospitalized and skilled nursing stay, they did a PT evaluation. They are going to be seeing him 1 time a week for the next 4 weeks to make sure he progresses with his strength and mobility  LAURA Levy [facility] # [phone]

## 2023-11-20 ENCOUNTER — APPOINTMENT (OUTPATIENT)
Dept: RADIOLOGY | Facility: HOSPITAL | Age: 63
DRG: 643 | End: 2023-11-20
Payer: MEDICARE

## 2023-11-20 ENCOUNTER — HOSPITAL ENCOUNTER (INPATIENT)
Facility: HOSPITAL | Age: 63
LOS: 7 days | Discharge: HOME HEALTH | DRG: 643 | End: 2023-11-27
Attending: STUDENT IN AN ORGANIZED HEALTH CARE EDUCATION/TRAINING PROGRAM | Admitting: STUDENT IN AN ORGANIZED HEALTH CARE EDUCATION/TRAINING PROGRAM
Payer: MEDICARE

## 2023-11-20 DIAGNOSIS — M32.9 SYSTEMIC LUPUS ERYTHEMATOSUS, UNSPECIFIED SLE TYPE, UNSPECIFIED ORGAN INVOLVEMENT STATUS (MULTI): Chronic | ICD-10-CM

## 2023-11-20 DIAGNOSIS — N39.0 UTI (URINARY TRACT INFECTION): Primary | ICD-10-CM

## 2023-11-20 DIAGNOSIS — E16.2 HYPOGLYCEMIA: ICD-10-CM

## 2023-11-20 DIAGNOSIS — M32.9 LUPUS (MULTI): ICD-10-CM

## 2023-11-20 DIAGNOSIS — E27.2 ADRENAL CRISIS (MULTI): ICD-10-CM

## 2023-11-20 DIAGNOSIS — R26.9 ABNORMAL GAIT: ICD-10-CM

## 2023-11-20 DIAGNOSIS — R42 DIZZINESS: ICD-10-CM

## 2023-11-20 DIAGNOSIS — R26.89 BALANCE PROBLEM: ICD-10-CM

## 2023-11-20 LAB
ALBUMIN SERPL BCP-MCNC: 3 G/DL (ref 3.4–5)
ALP SERPL-CCNC: 79 U/L (ref 33–136)
ALT SERPL W P-5'-P-CCNC: 24 U/L (ref 7–45)
ANION GAP SERPL CALC-SCNC: 13 MMOL/L (ref 10–20)
APPEARANCE UR: ABNORMAL
AST SERPL W P-5'-P-CCNC: 28 U/L (ref 9–39)
BASOPHILS # BLD MANUAL: 0 X10*3/UL (ref 0–0.1)
BASOPHILS NFR BLD MANUAL: 0 %
BILIRUB SERPL-MCNC: 0.2 MG/DL (ref 0–1.2)
BILIRUB UR STRIP.AUTO-MCNC: NEGATIVE MG/DL
BUN SERPL-MCNC: 53 MG/DL (ref 6–23)
BURR CELLS BLD QL SMEAR: NORMAL
CALCIUM SERPL-MCNC: 8.2 MG/DL (ref 8.6–10.3)
CHLORIDE SERPL-SCNC: 117 MMOL/L (ref 98–107)
CO2 SERPL-SCNC: 20 MMOL/L (ref 21–32)
COLOR UR: YELLOW
CREAT SERPL-MCNC: 1.68 MG/DL (ref 0.5–1.05)
CRP SERPL-MCNC: 0.88 MG/DL
EOSINOPHIL # BLD MANUAL: 0 X10*3/UL (ref 0–0.7)
EOSINOPHIL NFR BLD MANUAL: 0 %
ERYTHROCYTE [DISTWIDTH] IN BLOOD BY AUTOMATED COUNT: 20.9 % (ref 11.5–14.5)
ERYTHROCYTE [SEDIMENTATION RATE] IN BLOOD BY WESTERGREN METHOD: 26 MM/H (ref 0–30)
FLUAV RNA RESP QL NAA+PROBE: NOT DETECTED
FLUBV RNA RESP QL NAA+PROBE: NOT DETECTED
GFR SERPL CREATININE-BSD FRML MDRD: 34 ML/MIN/1.73M*2
GLUCOSE BLD MANUAL STRIP-MCNC: 131 MG/DL (ref 74–99)
GLUCOSE BLD MANUAL STRIP-MCNC: 51 MG/DL (ref 74–99)
GLUCOSE BLD MANUAL STRIP-MCNC: 58 MG/DL (ref 74–99)
GLUCOSE BLD MANUAL STRIP-MCNC: 58 MG/DL (ref 74–99)
GLUCOSE BLD MANUAL STRIP-MCNC: 65 MG/DL (ref 74–99)
GLUCOSE BLD MANUAL STRIP-MCNC: 70 MG/DL (ref 74–99)
GLUCOSE BLD MANUAL STRIP-MCNC: 88 MG/DL (ref 74–99)
GLUCOSE SERPL-MCNC: 71 MG/DL (ref 74–99)
GLUCOSE UR STRIP.AUTO-MCNC: NEGATIVE MG/DL
HCT VFR BLD AUTO: 26.6 % (ref 36–46)
HGB BLD-MCNC: 7.9 G/DL (ref 12–16)
HOLD SPECIMEN: NORMAL
IMM GRANULOCYTES # BLD AUTO: 0.02 X10*3/UL (ref 0–0.7)
IMM GRANULOCYTES NFR BLD AUTO: 0.7 % (ref 0–0.9)
KETONES UR STRIP.AUTO-MCNC: NEGATIVE MG/DL
LEUKOCYTE ESTERASE UR QL STRIP.AUTO: ABNORMAL
LYMPHOCYTES # BLD MANUAL: 1.47 X10*3/UL (ref 1.2–4.8)
LYMPHOCYTES NFR BLD MANUAL: 49 %
MCH RBC QN AUTO: 29.7 PG (ref 26–34)
MCHC RBC AUTO-ENTMCNC: 29.7 G/DL (ref 32–36)
MCV RBC AUTO: 100 FL (ref 80–100)
MONOCYTES # BLD MANUAL: 0.15 X10*3/UL (ref 0.1–1)
MONOCYTES NFR BLD MANUAL: 5 %
NEUTROPHILS # BLD MANUAL: 1.38 X10*3/UL (ref 1.2–7.7)
NEUTS BAND # BLD MANUAL: 0.06 X10*3/UL (ref 0–0.7)
NEUTS BAND NFR BLD MANUAL: 2 %
NEUTS SEG # BLD MANUAL: 1.32 X10*3/UL (ref 1.2–7)
NEUTS SEG NFR BLD MANUAL: 44 %
NITRITE UR QL STRIP.AUTO: NEGATIVE
NRBC BLD-RTO: 1 /100 WBCS (ref 0–0)
OVALOCYTES BLD QL SMEAR: NORMAL
PH UR STRIP.AUTO: 5 [PH]
PLATELET # BLD AUTO: 89 X10*3/UL (ref 150–450)
POTASSIUM SERPL-SCNC: 4.5 MMOL/L (ref 3.5–5.3)
PROT SERPL-MCNC: 5.1 G/DL (ref 6.4–8.2)
PROT UR STRIP.AUTO-MCNC: ABNORMAL MG/DL
RBC # BLD AUTO: 2.66 X10*6/UL (ref 4–5.2)
RBC # UR STRIP.AUTO: NEGATIVE /UL
RBC #/AREA URNS AUTO: ABNORMAL /HPF
RBC MORPH BLD: NORMAL
RSV RNA RESP QL NAA+PROBE: NOT DETECTED
SARS-COV-2 RNA RESP QL NAA+PROBE: NOT DETECTED
SCHISTOCYTES BLD QL SMEAR: NORMAL
SODIUM SERPL-SCNC: 145 MMOL/L (ref 136–145)
SP GR UR STRIP.AUTO: 1.01
TOTAL CELLS COUNTED BLD: 100
UROBILINOGEN UR STRIP.AUTO-MCNC: <2 MG/DL
WBC # BLD AUTO: 3 X10*3/UL (ref 4.4–11.3)
WBC #/AREA URNS AUTO: >50 /HPF

## 2023-11-20 PROCEDURE — 71045 X-RAY EXAM CHEST 1 VIEW: CPT | Mod: FY,FR

## 2023-11-20 PROCEDURE — 2500000001 HC RX 250 WO HCPCS SELF ADMINISTERED DRUGS (ALT 637 FOR MEDICARE OP)

## 2023-11-20 PROCEDURE — 82947 ASSAY GLUCOSE BLOOD QUANT: CPT

## 2023-11-20 PROCEDURE — 87506 IADNA-DNA/RNA PROBE TQ 6-11: CPT | Mod: STJLAB

## 2023-11-20 PROCEDURE — 85007 BL SMEAR W/DIFF WBC COUNT: CPT | Performed by: STUDENT IN AN ORGANIZED HEALTH CARE EDUCATION/TRAINING PROGRAM

## 2023-11-20 PROCEDURE — 80053 COMPREHEN METABOLIC PANEL: CPT | Performed by: STUDENT IN AN ORGANIZED HEALTH CARE EDUCATION/TRAINING PROGRAM

## 2023-11-20 PROCEDURE — 36415 COLL VENOUS BLD VENIPUNCTURE: CPT | Performed by: STUDENT IN AN ORGANIZED HEALTH CARE EDUCATION/TRAINING PROGRAM

## 2023-11-20 PROCEDURE — 87493 C DIFF AMPLIFIED PROBE: CPT | Mod: STJLAB

## 2023-11-20 PROCEDURE — 85027 COMPLETE CBC AUTOMATED: CPT | Performed by: STUDENT IN AN ORGANIZED HEALTH CARE EDUCATION/TRAINING PROGRAM

## 2023-11-20 PROCEDURE — 1100000001 HC PRIVATE ROOM DAILY

## 2023-11-20 PROCEDURE — 81001 URINALYSIS AUTO W/SCOPE: CPT | Performed by: STUDENT IN AN ORGANIZED HEALTH CARE EDUCATION/TRAINING PROGRAM

## 2023-11-20 PROCEDURE — 99285 EMERGENCY DEPT VISIT HI MDM: CPT | Performed by: STUDENT IN AN ORGANIZED HEALTH CARE EDUCATION/TRAINING PROGRAM

## 2023-11-20 PROCEDURE — 86140 C-REACTIVE PROTEIN: CPT | Performed by: STUDENT IN AN ORGANIZED HEALTH CARE EDUCATION/TRAINING PROGRAM

## 2023-11-20 PROCEDURE — 93010 ELECTROCARDIOGRAM REPORT: CPT | Performed by: STUDENT IN AN ORGANIZED HEALTH CARE EDUCATION/TRAINING PROGRAM

## 2023-11-20 PROCEDURE — 96365 THER/PROPH/DIAG IV INF INIT: CPT

## 2023-11-20 PROCEDURE — 2500000004 HC RX 250 GENERAL PHARMACY W/ HCPCS (ALT 636 FOR OP/ED)

## 2023-11-20 PROCEDURE — 87086 URINE CULTURE/COLONY COUNT: CPT | Mod: STJLAB | Performed by: STUDENT IN AN ORGANIZED HEALTH CARE EDUCATION/TRAINING PROGRAM

## 2023-11-20 PROCEDURE — 99285 EMERGENCY DEPT VISIT HI MDM: CPT | Mod: 25 | Performed by: STUDENT IN AN ORGANIZED HEALTH CARE EDUCATION/TRAINING PROGRAM

## 2023-11-20 PROCEDURE — 87637 SARSCOV2&INF A&B&RSV AMP PRB: CPT | Performed by: STUDENT IN AN ORGANIZED HEALTH CARE EDUCATION/TRAINING PROGRAM

## 2023-11-20 PROCEDURE — 74176 CT ABD & PELVIS W/O CONTRAST: CPT

## 2023-11-20 PROCEDURE — 74176 CT ABD & PELVIS W/O CONTRAST: CPT | Performed by: RADIOLOGY

## 2023-11-20 PROCEDURE — 93010 ELECTROCARDIOGRAM REPORT: CPT | Performed by: INTERNAL MEDICINE

## 2023-11-20 PROCEDURE — 85025 COMPLETE CBC W/AUTO DIFF WBC: CPT | Performed by: STUDENT IN AN ORGANIZED HEALTH CARE EDUCATION/TRAINING PROGRAM

## 2023-11-20 PROCEDURE — 87186 SC STD MICRODIL/AGAR DIL: CPT | Mod: STJLAB | Performed by: STUDENT IN AN ORGANIZED HEALTH CARE EDUCATION/TRAINING PROGRAM

## 2023-11-20 PROCEDURE — 85652 RBC SED RATE AUTOMATED: CPT | Performed by: STUDENT IN AN ORGANIZED HEALTH CARE EDUCATION/TRAINING PROGRAM

## 2023-11-20 PROCEDURE — 71045 X-RAY EXAM CHEST 1 VIEW: CPT | Mod: FOREIGN READ | Performed by: RADIOLOGY

## 2023-11-20 PROCEDURE — 2500000004 HC RX 250 GENERAL PHARMACY W/ HCPCS (ALT 636 FOR OP/ED): Performed by: STUDENT IN AN ORGANIZED HEALTH CARE EDUCATION/TRAINING PROGRAM

## 2023-11-20 PROCEDURE — 2500000005 HC RX 250 GENERAL PHARMACY W/O HCPCS

## 2023-11-20 RX ORDER — FLUDROCORTISONE ACETATE 0.1 MG/1
0.1 TABLET ORAL EVERY OTHER DAY
Status: DISCONTINUED | OUTPATIENT
Start: 2023-11-21 | End: 2023-11-21

## 2023-11-20 RX ORDER — PANTOPRAZOLE SODIUM 40 MG/1
40 TABLET, DELAYED RELEASE ORAL DAILY
Status: DISCONTINUED | OUTPATIENT
Start: 2023-11-20 | End: 2023-11-27 | Stop reason: HOSPADM

## 2023-11-20 RX ORDER — DEXTROSE MONOHYDRATE 100 MG/ML
0.3 INJECTION, SOLUTION INTRAVENOUS ONCE AS NEEDED
Status: DISCONTINUED | OUTPATIENT
Start: 2023-11-20 | End: 2023-11-27 | Stop reason: HOSPADM

## 2023-11-20 RX ORDER — DEXTROSE 50 % IN WATER (D50W) INTRAVENOUS SYRINGE
25
Status: DISCONTINUED | OUTPATIENT
Start: 2023-11-20 | End: 2023-11-27 | Stop reason: HOSPADM

## 2023-11-20 RX ORDER — MYCOPHENOLATE MOFETIL 250 MG/1
1000 CAPSULE ORAL 2 TIMES DAILY
Status: DISCONTINUED | OUTPATIENT
Start: 2023-11-20 | End: 2023-11-27 | Stop reason: HOSPADM

## 2023-11-20 RX ORDER — ACETAMINOPHEN 325 MG/1
650 TABLET ORAL EVERY 4 HOURS PRN
Status: DISCONTINUED | OUTPATIENT
Start: 2023-11-20 | End: 2023-11-27 | Stop reason: HOSPADM

## 2023-11-20 RX ORDER — POLYETHYLENE GLYCOL 3350 17 G/17G
17 POWDER, FOR SOLUTION ORAL DAILY
Status: DISCONTINUED | OUTPATIENT
Start: 2023-11-20 | End: 2023-11-22

## 2023-11-20 RX ORDER — CEFTRIAXONE 1 G/50ML
1 INJECTION, SOLUTION INTRAVENOUS ONCE
Status: COMPLETED | OUTPATIENT
Start: 2023-11-20 | End: 2023-11-20

## 2023-11-20 RX ORDER — PREDNISONE 5 MG/1
5 TABLET ORAL DAILY
Status: DISCONTINUED | OUTPATIENT
Start: 2023-11-20 | End: 2023-11-21

## 2023-11-20 RX ADMIN — PREDNISONE 5 MG: 5 TABLET ORAL at 21:58

## 2023-11-20 RX ADMIN — APIXABAN 2.5 MG: 2.5 TABLET, FILM COATED ORAL at 21:59

## 2023-11-20 RX ADMIN — DEXTROSE MONOHYDRATE 25 G: 25 INJECTION, SOLUTION INTRAVENOUS at 20:38

## 2023-11-20 RX ADMIN — CEFTRIAXONE SODIUM 1 G: 1 INJECTION, SOLUTION INTRAVENOUS at 18:26

## 2023-11-20 RX ADMIN — MYCOPHENOLATE MOFETIL 1000 MG: 250 CAPSULE ORAL at 21:59

## 2023-11-20 RX ADMIN — PANTOPRAZOLE SODIUM 40 MG: 40 TABLET, DELAYED RELEASE ORAL at 21:58

## 2023-11-20 SDOH — SOCIAL STABILITY: SOCIAL INSECURITY: WERE YOU ABLE TO COMPLETE ALL THE BEHAVIORAL HEALTH SCREENINGS?: YES

## 2023-11-20 SDOH — ECONOMIC STABILITY: INCOME INSECURITY: IN THE LAST 12 MONTHS, WAS THERE A TIME WHEN YOU WERE NOT ABLE TO PAY THE MORTGAGE OR RENT ON TIME?: NO

## 2023-11-20 SDOH — SOCIAL STABILITY: SOCIAL INSECURITY: DO YOU FEEL ANYONE HAS EXPLOITED OR TAKEN ADVANTAGE OF YOU FINANCIALLY OR OF YOUR PERSONAL PROPERTY?: NO

## 2023-11-20 SDOH — SOCIAL STABILITY: SOCIAL INSECURITY: DOES ANYONE TRY TO KEEP YOU FROM HAVING/CONTACTING OTHER FRIENDS OR DOING THINGS OUTSIDE YOUR HOME?: NO

## 2023-11-20 SDOH — ECONOMIC STABILITY: HOUSING INSECURITY: IN THE LAST 12 MONTHS, HOW MANY PLACES HAVE YOU LIVED?: 1

## 2023-11-20 SDOH — SOCIAL STABILITY: SOCIAL INSECURITY: ABUSE: ADULT

## 2023-11-20 SDOH — SOCIAL STABILITY: SOCIAL INSECURITY: HAS ANYONE EVER THREATENED TO HURT YOUR FAMILY OR YOUR PETS?: NO

## 2023-11-20 SDOH — ECONOMIC STABILITY: INCOME INSECURITY: HOW HARD IS IT FOR YOU TO PAY FOR THE VERY BASICS LIKE FOOD, HOUSING, MEDICAL CARE, AND HEATING?: NOT HARD AT ALL

## 2023-11-20 SDOH — SOCIAL STABILITY: SOCIAL INSECURITY: HAVE YOU HAD THOUGHTS OF HARMING ANYONE ELSE?: NO

## 2023-11-20 SDOH — SOCIAL STABILITY: SOCIAL INSECURITY: DO YOU FEEL UNSAFE GOING BACK TO THE PLACE WHERE YOU ARE LIVING?: NO

## 2023-11-20 SDOH — SOCIAL STABILITY: SOCIAL INSECURITY: ARE THERE ANY APPARENT SIGNS OF INJURIES/BEHAVIORS THAT COULD BE RELATED TO ABUSE/NEGLECT?: NO

## 2023-11-20 SDOH — SOCIAL STABILITY: SOCIAL INSECURITY: ARE YOU OR HAVE YOU BEEN THREATENED OR ABUSED PHYSICALLY, EMOTIONALLY, OR SEXUALLY BY ANYONE?: NO

## 2023-11-20 ASSESSMENT — ENCOUNTER SYMPTOMS
DIARRHEA: 1
COUGH: 0
ABDOMINAL PAIN: 1
NAUSEA: 1
VOMITING: 1
APPETITE CHANGE: 0
FEVER: 0
CHILLS: 0
CONSTIPATION: 0
SHORTNESS OF BREATH: 0
DYSURIA: 0

## 2023-11-20 ASSESSMENT — ACTIVITIES OF DAILY LIVING (ADL)
WALKS IN HOME: NEEDS ASSISTANCE
JUDGMENT_ADEQUATE_SAFELY_COMPLETE_DAILY_ACTIVITIES: YES
FEEDING YOURSELF: INDEPENDENT
GROOMING: NEEDS ASSISTANCE
TOILETING: NEEDS ASSISTANCE
ADEQUATE_TO_COMPLETE_ADL: YES
ASSISTIVE_DEVICE: EYEGLASSES
HEARING - LEFT EAR: FUNCTIONAL
LACK_OF_TRANSPORTATION: NO
BATHING: NEEDS ASSISTANCE
DRESSING YOURSELF: NEEDS ASSISTANCE
HEARING - RIGHT EAR: FUNCTIONAL
PATIENT'S MEMORY ADEQUATE TO SAFELY COMPLETE DAILY ACTIVITIES?: YES

## 2023-11-20 ASSESSMENT — COLUMBIA-SUICIDE SEVERITY RATING SCALE - C-SSRS
2. HAVE YOU ACTUALLY HAD ANY THOUGHTS OF KILLING YOURSELF?: NO
1. IN THE PAST MONTH, HAVE YOU WISHED YOU WERE DEAD OR WISHED YOU COULD GO TO SLEEP AND NOT WAKE UP?: NO
2. HAVE YOU ACTUALLY HAD ANY THOUGHTS OF KILLING YOURSELF?: NO
6. HAVE YOU EVER DONE ANYTHING, STARTED TO DO ANYTHING, OR PREPARED TO DO ANYTHING TO END YOUR LIFE?: NO
1. IN THE PAST MONTH, HAVE YOU WISHED YOU WERE DEAD OR WISHED YOU COULD GO TO SLEEP AND NOT WAKE UP?: NO
6. HAVE YOU EVER DONE ANYTHING, STARTED TO DO ANYTHING, OR PREPARED TO DO ANYTHING TO END YOUR LIFE?: NO

## 2023-11-20 ASSESSMENT — LIFESTYLE VARIABLES
REASON UNABLE TO ASSESS: NO
SKIP TO QUESTIONS 9-10: 1
EVER FELT BAD OR GUILTY ABOUT YOUR DRINKING: NO
HAVE YOU EVER FELT YOU SHOULD CUT DOWN ON YOUR DRINKING: NO
PRESCIPTION_ABUSE_PAST_12_MONTHS: NO
HOW MANY STANDARD DRINKS CONTAINING ALCOHOL DO YOU HAVE ON A TYPICAL DAY: PATIENT DOES NOT DRINK
EVER HAD A DRINK FIRST THING IN THE MORNING TO STEADY YOUR NERVES TO GET RID OF A HANGOVER: NO
AUDIT-C TOTAL SCORE: 0
SUBSTANCE_ABUSE_PAST_12_MONTHS: NO
AUDIT-C TOTAL SCORE: 0
HAVE PEOPLE ANNOYED YOU BY CRITICIZING YOUR DRINKING: NO
HOW OFTEN DO YOU HAVE 6 OR MORE DRINKS ON ONE OCCASION: NEVER
HOW OFTEN DO YOU HAVE A DRINK CONTAINING ALCOHOL: NEVER

## 2023-11-20 ASSESSMENT — PAIN SCALES - GENERAL
PAINLEVEL_OUTOF10: 0 - NO PAIN

## 2023-11-20 ASSESSMENT — COGNITIVE AND FUNCTIONAL STATUS - GENERAL
EATING MEALS: A LITTLE
DAILY ACTIVITIY SCORE: 13
PERSONAL GROOMING: A LOT
MOBILITY SCORE: 15
PATIENT BASELINE BEDBOUND: NO
MOVING TO AND FROM BED TO CHAIR: A LOT
TOILETING: A LOT
STANDING UP FROM CHAIR USING ARMS: A LOT
TURNING FROM BACK TO SIDE WHILE IN FLAT BAD: A LITTLE
DRESSING REGULAR UPPER BODY CLOTHING: A LOT
CLIMB 3 TO 5 STEPS WITH RAILING: A LOT
WALKING IN HOSPITAL ROOM: A LOT
HELP NEEDED FOR BATHING: A LOT
DRESSING REGULAR LOWER BODY CLOTHING: A LOT

## 2023-11-20 ASSESSMENT — PATIENT HEALTH QUESTIONNAIRE - PHQ9
1. LITTLE INTEREST OR PLEASURE IN DOING THINGS: NOT AT ALL
SUM OF ALL RESPONSES TO PHQ9 QUESTIONS 1 & 2: 0
2. FEELING DOWN, DEPRESSED OR HOPELESS: NOT AT ALL

## 2023-11-20 ASSESSMENT — PAIN - FUNCTIONAL ASSESSMENT
PAIN_FUNCTIONAL_ASSESSMENT: 0-10
PAIN_FUNCTIONAL_ASSESSMENT: 0-10

## 2023-11-21 PROBLEM — M12.9 ARTHROPATHY: Chronic | Status: ACTIVE | Noted: 2023-10-30

## 2023-11-21 PROBLEM — E16.2 HYPOGLYCEMIA: Chronic | Status: ACTIVE | Noted: 2023-06-22

## 2023-11-21 PROBLEM — D61.818 PANCYTOPENIA (MULTI): Chronic | Status: ACTIVE | Noted: 2023-10-30

## 2023-11-21 LAB
ANION GAP SERPL CALC-SCNC: 12 MMOL/L (ref 10–20)
BASOPHILS # BLD AUTO: 0.01 X10*3/UL (ref 0–0.1)
BASOPHILS NFR BLD AUTO: 0.3 %
BUN SERPL-MCNC: 47 MG/DL (ref 6–23)
C COLI+JEJ+UPSA DNA STL QL NAA+PROBE: NOT DETECTED
C DIF TOX TCDA+TCDB STL QL NAA+PROBE: NOT DETECTED
CALCIUM SERPL-MCNC: 8.7 MG/DL (ref 8.6–10.3)
CHLORIDE SERPL-SCNC: 114 MMOL/L (ref 98–107)
CO2 SERPL-SCNC: 22 MMOL/L (ref 21–32)
CORTIS AM PEAK SERPL-MSCNC: 50.8 UG/DL (ref 5–20)
CREAT SERPL-MCNC: 1.61 MG/DL (ref 0.5–1.05)
DSDNA AB SER-ACNC: <1 IU/ML
EC STX1 GENE STL QL NAA+PROBE: NOT DETECTED
EC STX2 GENE STL QL NAA+PROBE: NOT DETECTED
EOSINOPHIL # BLD AUTO: 0 X10*3/UL (ref 0–0.7)
EOSINOPHIL NFR BLD AUTO: 0 %
ERYTHROCYTE [DISTWIDTH] IN BLOOD BY AUTOMATED COUNT: 20.8 % (ref 11.5–14.5)
GFR SERPL CREATININE-BSD FRML MDRD: 36 ML/MIN/1.73M*2
GLUCOSE BLD MANUAL STRIP-MCNC: 125 MG/DL (ref 74–99)
GLUCOSE BLD MANUAL STRIP-MCNC: 128 MG/DL (ref 74–99)
GLUCOSE BLD MANUAL STRIP-MCNC: 137 MG/DL (ref 74–99)
GLUCOSE BLD MANUAL STRIP-MCNC: 142 MG/DL (ref 74–99)
GLUCOSE BLD MANUAL STRIP-MCNC: 156 MG/DL (ref 74–99)
GLUCOSE BLD MANUAL STRIP-MCNC: 162 MG/DL (ref 74–99)
GLUCOSE BLD MANUAL STRIP-MCNC: 60 MG/DL (ref 74–99)
GLUCOSE BLD MANUAL STRIP-MCNC: 63 MG/DL (ref 74–99)
GLUCOSE BLD MANUAL STRIP-MCNC: 76 MG/DL (ref 74–99)
GLUCOSE BLD MANUAL STRIP-MCNC: 96 MG/DL (ref 74–99)
GLUCOSE SERPL-MCNC: 81 MG/DL (ref 74–99)
HCT VFR BLD AUTO: 28.8 % (ref 36–46)
HGB BLD-MCNC: 8.3 G/DL (ref 12–16)
IMM GRANULOCYTES # BLD AUTO: 0.04 X10*3/UL (ref 0–0.7)
IMM GRANULOCYTES NFR BLD AUTO: 1.3 % (ref 0–0.9)
LYMPHOCYTES # BLD AUTO: 0.5 X10*3/UL (ref 1.2–4.8)
LYMPHOCYTES NFR BLD AUTO: 16 %
MAGNESIUM SERPL-MCNC: 1.61 MG/DL (ref 1.6–2.4)
MCH RBC QN AUTO: 28.9 PG (ref 26–34)
MCHC RBC AUTO-ENTMCNC: 28.8 G/DL (ref 32–36)
MCV RBC AUTO: 100 FL (ref 80–100)
MONOCYTES # BLD AUTO: 0.07 X10*3/UL (ref 0.1–1)
MONOCYTES NFR BLD AUTO: 2.2 %
NEUTROPHILS # BLD AUTO: 2.5 X10*3/UL (ref 1.2–7.7)
NEUTROPHILS NFR BLD AUTO: 80.2 %
NOROVIRUS GI + GII RNA STL NAA+PROBE: DETECTED
NRBC BLD-RTO: 0 /100 WBCS (ref 0–0)
OVALOCYTES BLD QL SMEAR: NORMAL
PLATELET # BLD AUTO: 94 X10*3/UL (ref 150–450)
POTASSIUM SERPL-SCNC: 4.7 MMOL/L (ref 3.5–5.3)
RBC # BLD AUTO: 2.87 X10*6/UL (ref 4–5.2)
RBC MORPH BLD: NORMAL
RV RNA STL NAA+PROBE: NOT DETECTED
SALMONELLA DNA STL QL NAA+PROBE: NOT DETECTED
SCHISTOCYTES BLD QL SMEAR: NORMAL
SHIGELLA DNA SPEC QL NAA+PROBE: NOT DETECTED
SODIUM SERPL-SCNC: 143 MMOL/L (ref 136–145)
V CHOLERAE DNA STL QL NAA+PROBE: NOT DETECTED
WBC # BLD AUTO: 3.1 X10*3/UL (ref 4.4–11.3)
Y ENTEROCOL DNA STL QL NAA+PROBE: NOT DETECTED

## 2023-11-21 PROCEDURE — 1100000001 HC PRIVATE ROOM DAILY

## 2023-11-21 PROCEDURE — 80048 BASIC METABOLIC PNL TOTAL CA: CPT

## 2023-11-21 PROCEDURE — 99223 1ST HOSP IP/OBS HIGH 75: CPT | Performed by: STUDENT IN AN ORGANIZED HEALTH CARE EDUCATION/TRAINING PROGRAM

## 2023-11-21 PROCEDURE — 97162 PT EVAL MOD COMPLEX 30 MIN: CPT | Mod: GP

## 2023-11-21 PROCEDURE — 82947 ASSAY GLUCOSE BLOOD QUANT: CPT

## 2023-11-21 PROCEDURE — 83735 ASSAY OF MAGNESIUM: CPT

## 2023-11-21 PROCEDURE — 86225 DNA ANTIBODY NATIVE: CPT | Mod: STJLAB | Performed by: STUDENT IN AN ORGANIZED HEALTH CARE EDUCATION/TRAINING PROGRAM

## 2023-11-21 PROCEDURE — 82024 ASSAY OF ACTH: CPT | Performed by: STUDENT IN AN ORGANIZED HEALTH CARE EDUCATION/TRAINING PROGRAM

## 2023-11-21 PROCEDURE — 2500000001 HC RX 250 WO HCPCS SELF ADMINISTERED DRUGS (ALT 637 FOR MEDICARE OP): Performed by: STUDENT IN AN ORGANIZED HEALTH CARE EDUCATION/TRAINING PROGRAM

## 2023-11-21 PROCEDURE — 97165 OT EVAL LOW COMPLEX 30 MIN: CPT | Mod: GO

## 2023-11-21 PROCEDURE — 97530 THERAPEUTIC ACTIVITIES: CPT | Mod: GP

## 2023-11-21 PROCEDURE — 82533 TOTAL CORTISOL: CPT | Mod: STJLAB | Performed by: STUDENT IN AN ORGANIZED HEALTH CARE EDUCATION/TRAINING PROGRAM

## 2023-11-21 PROCEDURE — 36415 COLL VENOUS BLD VENIPUNCTURE: CPT | Performed by: STUDENT IN AN ORGANIZED HEALTH CARE EDUCATION/TRAINING PROGRAM

## 2023-11-21 PROCEDURE — 86337 INSULIN ANTIBODIES: CPT | Performed by: STUDENT IN AN ORGANIZED HEALTH CARE EDUCATION/TRAINING PROGRAM

## 2023-11-21 PROCEDURE — 2500000002 HC RX 250 W HCPCS SELF ADMINISTERED DRUGS (ALT 637 FOR MEDICARE OP, ALT 636 FOR OP/ED)

## 2023-11-21 PROCEDURE — 94640 AIRWAY INHALATION TREATMENT: CPT

## 2023-11-21 PROCEDURE — 2500000002 HC RX 250 W HCPCS SELF ADMINISTERED DRUGS (ALT 637 FOR MEDICARE OP, ALT 636 FOR OP/ED): Performed by: STUDENT IN AN ORGANIZED HEALTH CARE EDUCATION/TRAINING PROGRAM

## 2023-11-21 PROCEDURE — 2500000001 HC RX 250 WO HCPCS SELF ADMINISTERED DRUGS (ALT 637 FOR MEDICARE OP)

## 2023-11-21 PROCEDURE — 2500000005 HC RX 250 GENERAL PHARMACY W/O HCPCS

## 2023-11-21 PROCEDURE — 2500000004 HC RX 250 GENERAL PHARMACY W/ HCPCS (ALT 636 FOR OP/ED)

## 2023-11-21 PROCEDURE — 85025 COMPLETE CBC W/AUTO DIFF WBC: CPT

## 2023-11-21 RX ORDER — IPRATROPIUM BROMIDE AND ALBUTEROL SULFATE 2.5; .5 MG/3ML; MG/3ML
3 SOLUTION RESPIRATORY (INHALATION) EVERY 2 HOUR PRN
Status: DISCONTINUED | OUTPATIENT
Start: 2023-11-21 | End: 2023-11-27 | Stop reason: HOSPADM

## 2023-11-21 RX ORDER — BUDESONIDE 0.25 MG/2ML
0.25 INHALANT ORAL
Status: DISCONTINUED | OUTPATIENT
Start: 2023-11-21 | End: 2023-11-25

## 2023-11-21 RX ORDER — IPRATROPIUM BROMIDE AND ALBUTEROL SULFATE 2.5; .5 MG/3ML; MG/3ML
3 SOLUTION RESPIRATORY (INHALATION) 2 TIMES DAILY
Status: DISCONTINUED | OUTPATIENT
Start: 2023-11-21 | End: 2023-11-25

## 2023-11-21 RX ORDER — CEFTRIAXONE 1 G/50ML
1 INJECTION, SOLUTION INTRAVENOUS ONCE
Status: DISCONTINUED | OUTPATIENT
Start: 2023-11-21 | End: 2023-11-21

## 2023-11-21 RX ORDER — HYDROCORTISONE 20 MG/1
20 TABLET ORAL EVERY 12 HOURS SCHEDULED
Status: DISCONTINUED | OUTPATIENT
Start: 2023-11-21 | End: 2023-11-21

## 2023-11-21 RX ORDER — CEFTRIAXONE 1 G/50ML
1 INJECTION, SOLUTION INTRAVENOUS ONCE
Status: COMPLETED | OUTPATIENT
Start: 2023-11-21 | End: 2023-11-21

## 2023-11-21 RX ADMIN — HYDROCORTISONE 30 MG: 20 TABLET ORAL at 10:19

## 2023-11-21 RX ADMIN — PANTOPRAZOLE SODIUM 40 MG: 40 TABLET, DELAYED RELEASE ORAL at 08:32

## 2023-11-21 RX ADMIN — HYDROCORTISONE SODIUM SUCCINATE 100 MG: 100 INJECTION, POWDER, FOR SOLUTION INTRAMUSCULAR; INTRAVENOUS at 01:19

## 2023-11-21 RX ADMIN — HYDROCORTISONE SODIUM SUCCINATE 75 MG: 100 INJECTION, POWDER, FOR SOLUTION INTRAMUSCULAR; INTRAVENOUS at 06:51

## 2023-11-21 RX ADMIN — DEXTROSE MONOHYDRATE 25 G: 25 INJECTION, SOLUTION INTRAVENOUS at 04:00

## 2023-11-21 RX ADMIN — DEXTROSE MONOHYDRATE 25 G: 25 INJECTION, SOLUTION INTRAVENOUS at 08:31

## 2023-11-21 RX ADMIN — BUDESONIDE 0.25 MG: 0.25 SUSPENSION RESPIRATORY (INHALATION) at 19:47

## 2023-11-21 RX ADMIN — APIXABAN 2.5 MG: 2.5 TABLET, FILM COATED ORAL at 08:32

## 2023-11-21 RX ADMIN — IPRATROPIUM BROMIDE AND ALBUTEROL SULFATE 3 ML: 2.5; .5 SOLUTION RESPIRATORY (INHALATION) at 19:48

## 2023-11-21 RX ADMIN — CEFTRIAXONE SODIUM 1 G: 1 INJECTION, SOLUTION INTRAVENOUS at 05:45

## 2023-11-21 RX ADMIN — APIXABAN 2.5 MG: 2.5 TABLET, FILM COATED ORAL at 21:41

## 2023-11-21 RX ADMIN — MYCOPHENOLATE MOFETIL 1000 MG: 250 CAPSULE ORAL at 21:41

## 2023-11-21 RX ADMIN — DEXTROSE MONOHYDRATE 25 G: 25 INJECTION, SOLUTION INTRAVENOUS at 00:21

## 2023-11-21 RX ADMIN — HYDROCORTISONE 30 MG: 20 TABLET ORAL at 21:42

## 2023-11-21 RX ADMIN — MYCOPHENOLATE MOFETIL 1000 MG: 250 CAPSULE ORAL at 08:32

## 2023-11-21 ASSESSMENT — COGNITIVE AND FUNCTIONAL STATUS - GENERAL
WALKING IN HOSPITAL ROOM: A LOT
DRESSING REGULAR LOWER BODY CLOTHING: A LOT
MOVING FROM LYING ON BACK TO SITTING ON SIDE OF FLAT BED WITH BEDRAILS: A LITTLE
DRESSING REGULAR UPPER BODY CLOTHING: A LOT
DAILY ACTIVITIY SCORE: 11
STANDING UP FROM CHAIR USING ARMS: A LOT
HELP NEEDED FOR BATHING: A LOT
MOVING TO AND FROM BED TO CHAIR: A LOT
DRESSING REGULAR UPPER BODY CLOTHING: A LOT
PERSONAL GROOMING: A LOT
CLIMB 3 TO 5 STEPS WITH RAILING: TOTAL
MOBILITY SCORE: 12
STANDING UP FROM CHAIR USING ARMS: A LOT
WALKING IN HOSPITAL ROOM: TOTAL
TOILETING: A LITTLE
DAILY ACTIVITIY SCORE: 16
MOBILITY SCORE: 14
PERSONAL GROOMING: A LITTLE
HELP NEEDED FOR BATHING: A LOT
TOILETING: TOTAL
DRESSING REGULAR LOWER BODY CLOTHING: TOTAL
TURNING FROM BACK TO SIDE WHILE IN FLAT BAD: A LITTLE
EATING MEALS: A LITTLE
CLIMB 3 TO 5 STEPS WITH RAILING: A LOT
MOVING FROM LYING ON BACK TO SITTING ON SIDE OF FLAT BED WITH BEDRAILS: A LITTLE
TURNING FROM BACK TO SIDE WHILE IN FLAT BAD: A LITTLE
MOVING TO AND FROM BED TO CHAIR: A LOT

## 2023-11-21 ASSESSMENT — ACTIVITIES OF DAILY LIVING (ADL): BATHING_ASSISTANCE: MAXIMAL

## 2023-11-21 ASSESSMENT — PAIN - FUNCTIONAL ASSESSMENT
PAIN_FUNCTIONAL_ASSESSMENT: 0-10

## 2023-11-21 ASSESSMENT — PAIN SCALES - GENERAL
PAINLEVEL_OUTOF10: 0 - NO PAIN

## 2023-11-21 NOTE — PROGRESS NOTES
Bing Holliday is a 62 y.o. female on day 1 of admission presenting with Hypoglycemia.    Subjective   The patient was seen this morning at bedside. There were no acute events overnight. Patient denies chest pain, dyspnea, fevers, chills, nausea, vomiting and diarrhea. Patient endorses loose stools that were less frequent in nature than at presentation to the ED.    Overnight the patient received Dextrose 50% 25g at 2038, 0021, and 0400 for persistent hypoglycemia. Home prednisone, protonix, MMF, Eliquis was started. Rocephin was given at 0545       Objective     Physical Exam  Vitals and nursing note reviewed.   Constitutional:       General: She is not in acute distress.     Appearance: She is not ill-appearing.      Comments: Generalized weakness   HENT:      Head: Normocephalic and atraumatic.   Eyes:      Extraocular Movements: Extraocular movements intact.      Pupils: Pupils are equal, round, and reactive to light.   Cardiovascular:      Rate and Rhythm: Normal rate and regular rhythm.      Pulses: Normal pulses.      Heart sounds: Normal heart sounds. No murmur heard.     No friction rub. No gallop.   Pulmonary:      Effort: Pulmonary effort is normal.      Breath sounds: Normal breath sounds. No wheezing, rhonchi or rales.   Abdominal:      General: Abdomen is flat. Bowel sounds are normal. There is no distension.      Palpations: Abdomen is soft.      Tenderness: There is no abdominal tenderness (improved from time of admission). There is no guarding.   Musculoskeletal:      Right hand: Swelling and deformity present. Decreased range of motion. Decreased strength of finger abduction and thumb/finger opposition.      Left hand: Swelling and deformity present. Decreased range of motion. Decreased strength of finger abduction and thumb/finger opposition.      Comments: Jaccoud's arthropathy   Skin:     General: Skin is warm and dry.      Capillary Refill: Capillary refill takes 2 to 3 seconds.       "Coloration: Skin is not jaundiced or pale.      Findings: No rash.   Neurological:      General: No focal deficit present.      Mental Status: She is disoriented.      Comments: Oriented to name and place, had difficulty stating year         Vitals  Vitals:    11/20/23 2000 11/21/23 0000 11/21/23 0400 11/21/23 0800   BP: 95/64 110/68 112/69 156/87   BP Location: Right arm Right arm Right arm Left arm   Patient Position: Lying Lying Lying Lying   Pulse: 60 64 63 62   Resp: 16 16 16 18   Temp: 35.6 °C (96.1 °F) 35.8 °C (96.4 °F) 36.7 °C (98.1 °F) 35.8 °C (96.4 °F)   TempSrc: Temporal Temporal Temporal Temporal   SpO2: 100% 94% 98% 97%   Weight: 94.8 kg (208 lb 15.9 oz)      Height: 1.803 m (5' 11\")            Intake/Output Summary (Last 24 hours) at 11/21/2023 1136  Last data filed at 11/21/2023 0800  Gross per 24 hour   Intake 460 ml   Output --   Net 460 ml       Scheduled Medications  apixaban, 2.5 mg, oral, BID  [Held by provider] fludrocortisone, 0.1 mg, oral, Every other day  hydrocortisone, 30 mg, oral, q12h GERALD  mycophenolate, 1,000 mg, oral, BID  pantoprazole, 40 mg, oral, Daily  polyethylene glycol, 17 g, oral, Daily  [Held by provider] predniSONE, 5 mg, oral, Daily        Continuous Medications       PRN Medications  PRN medications: acetaminophen, dextrose 10 % in water (D10W), dextrose, glucagon    Relevant CBC Results  Lab Results   Component Value Date    WBC 3.1 (L) 11/21/2023    RBC 2.87 (L) 11/21/2023    HGB 8.3 (L) 11/21/2023    HCT 28.8 (L) 11/21/2023    PLT 94 (L) 11/21/2023       Relevant RFP/HFP Results  Lab Results   Component Value Date     11/21/2023    K 4.7 11/21/2023     (H) 11/21/2023    CO2 22 11/21/2023    BUN 47 (H) 11/21/2023    CREATININE 1.61 (H) 11/21/2023    GLUCOSE 81 11/21/2023    CALCIUM 8.7 11/21/2023    PHOS 3.6 09/01/2023    MG 1.61 11/21/2023    BILITOT 0.2 11/20/2023    ALT 24 11/20/2023    AST 28 11/20/2023       Imaging Results  CT abdomen pelvis wo IV " contrast    Result Date: 11/20/2023  Interpreted By:  Deacon Munoz, STUDY: CT ABDOMEN PELVIS WO IV CONTRAST;  11/20/2023 10:25 pm   INDICATION: Signs/Symptoms:epigastric/periumbilical abd pain w/ diarrhea.   COMPARISON: 8/28/2023   ACCESSION NUMBER(S): [id number]   ORDERING CLINICIAN: ARELIS VILLAGRAN   TECHNIQUE: Contiguous axial images of the abdomen and pelvis were obtained without intravenous contrast. Coronal and sagittal reformatted images were obtained from the axial images.   FINDINGS: There is limited evaluation of the lung bases. Mild basilar subsegmental atelectasis.   Cardiomegaly and coronary artery atherosclerotic calcifications.   Evaluation of the abdominal viscera is limited secondary to lack of intravenous contrast. Limited evaluation for liver mass on noncontrast examination. The gallbladder surgically absent.   The pancreas, spleen, and adrenal glands appear unremarkable.   Evaluation of the kidneys is limited secondary to lack of intravenous contrast. 2 mm nonobstructive right renal calculus. No hydronephrosis. Right renal vascular calcifications.   Atherosclerotic calcification of the abdominal aorta and bilateral iliac arteries.   No evidence of bowel obstruction or acute appendicitis. There is colonic diverticulosis without evidence of acute diverticulitis. There is underdistention versus wall thickening of the ascending colon. The colon is diffusely underdistended and not well evaluated.   Limited evaluation of the urinary bladder is underdistention and beam hardening artifact.   Postsurgical change of right hip arthroplasty resulting in beam hardening artifact and limiting evaluation of the pelvis. There is stable advanced left hip osteoarthrosis with joint space loss and subchondral sclerosis and cystic change and femoral head neck junction osseous spurring.   Multilevel degenerative change of the lumbar spine. There is stable multilevel degenerative endplate change. Stable mild compression  deformity of L1.       No evidence of bowel obstruction or acute appendicitis. Colon is diffusely underdistended and not well evaluated. There is underdistention versus wall thickening of the ascending colon which may be infectious or inflammatory in etiology. Colonic diverticulosis without evidence of acute diverticulitis.   Limited evaluation of the urinary bladder secondary to underdistention and beam hardening artifact. Underdistention versus mild urinary bladder wall thickening; please correlate with urinalysis to exclude cystitis.   MACRO: None   Signed by: Deacon Munoz 11/20/2023 11:26 PM Dictation workstation:   [id number]    XR chest 1 view    Result Date: 11/20/2023  STUDY: Chest Radiograph;  11/20/2023 3:08 PM. INDICATION: Weakness. COMPARISON: XR chest 10/19/2023. ACCESSION NUMBER(S): [id number] ORDERING CLINICIAN: BUSHRA NGUYEN TECHNIQUE:  Frontal chest was obtained at 15:07 hours. FINDINGS: CARDIOMEDIASTINAL SILHOUETTE: Cardiomediastinal silhouette is mildly enlarged in size. The aorta is calcified and tortuous.  LUNGS: Lungs are mildly hypoinflated, but appear clear.  ABDOMEN: No remarkable upper abdominal findings.  BONES: No acute osseous changes.    Stable mild enlargement of the cardiac silhouette. Mildly hypoinflated lungs which appear clear. Calcified and tortuous aorta. Signed by Alan Linton MD       CT abdomen pelvis wo IV contrast   Final Result   No evidence of bowel obstruction or acute appendicitis. Colon is   diffusely underdistended and not well evaluated. There is   underdistention versus wall thickening of the ascending colon which   may be infectious or inflammatory in etiology. Colonic diverticulosis   without evidence of acute diverticulitis.        Limited evaluation of the urinary bladder secondary to   underdistention and beam hardening artifact. Underdistention versus   mild urinary bladder wall thickening; please correlate with   urinalysis to exclude cystitis.         MACRO:   None        Signed by: Deacon Tammy 11/20/2023 11:26 PM   Dictation workstation:   [id number]      XR chest 1 view   Final Result   Stable mild enlargement of the cardiac silhouette. Mildly hypoinflated   lungs which appear clear. Calcified and tortuous aorta.    Signed by Alan Linton MD            Assessment/Plan   This is a 62-year-old female with PMHx significant for NIDDM2, CKD3, SLE c/b lupus cerebritis, Jaccoud's arthropathy, and possible AI (on fludrocortisone) with symptoms concerning for UTI and C.diff v. gastroenteritis.    Regarding the hypoglycemia, the simplest explanation seems to be medication non-adherence in the setting of adrenal insufficiency. Alternative explanations for hypoglycemia in this patient include insulinoma and SLE flair resulting in development of insulin antibodies. When asked, patient states she has been adherent with every medication. From prior endocrine serology studies, fasting insulin was 10 uIU/mL, C-peptide was 3.0 ng/mL, and beta-hydroxybutyrate was 0.007 mmol/L. All of these values are within the normal reference ranges respectively. From here, the home steroid medications she is taking should be reintroduced as tolerated. This would likely resolve her hypoglycemia, and proper adherence to medication will likely prevent further episodes of hypoglycemia from occurring. Should the hypoglycemia be refractory to medication, work-up for alternative explanations is indicated. Based on patient's presentation and symptomatology, insulinoma seems unlikely, however, if a proper work-up was to be done, it would entail a 72-hour fast with testing of glucose (to confirm hypoglycemia), insulin, C-peptide, proinsulin, beta-hydroxybutyrate, and a simultaneous negative sulfonylurea level. Additionally, another explanation could be a SLE symptom flair with production of insulin antibodies. Insulin antibody serology studies can be ordered.     Regarding the possible UTI, it may  also be asymptomatic bacteriuria. The UA results showed positive leukocyte esterase and WBCs. However, the patient does not endorse any symptoms indicative of UTI (urinary incontinence, burning sensation while urinating, suprapubic tenderness). She was given Rocephin at the ED and another dose after she was admitted. Continuing this would not be ideal as its not prudent antibiotic practice, especially since she has a prior history of C. diff. Therefore, Rocephin should be discontinued as the UA results likely indicate asymptomatic bacteriuria as opposed to UTI. Resolution of the bacteriuria isn't something to look for, rather emergence of symptoms is concerning for UTI. If discontinuation of antibiotics results in emergence of symptoms, then reinitiating of antibiotics is indicated.    Regarding the possible C. diff infection, it may also be a case of viral gastroenteritis. The stool PCR for C. diff has been collected and the results are pending. Additionally, patient was not febrile at presentation. The patient had multiple bowel movements after admission, but they were lower in frequency of what is expected of C. diff. Additionally, the stool was loose but non-secretory. The characteristic odor of C. diff was not present either. With all of these findings, it is plausible that patient's GI symptoms are likely due to a viral gastroenteritis. Encouraging oral hydration seems to be the best course of action with spontaneous resolution of symptoms. If symptoms do not resolve, accurate assessment of volume status is necessary. Maintaining hydration and administration of loperamide may be necessary. If vomiting develops, ondansetron may be required. Currently, it seems that diarrheal symptoms have already improved since presentation at ED.       #Hypoglycemia 2/2 medication non-adherence v. insulin antibodies v. Insulinoma  #IDDM2  - Start hydrocortisone 30 mg bid as tolerated   - q4h accuchecks, hypoglycemia protocol  in place, D50 25 g prn  - Ordered insulin antibodies, ACTH, anti-dsDNA antibody, AM cortisol  - Safe to discharge if glucose >70 mg/dL x4 accuchecks and AMS symptoms resolve.     #Asymptomatic bacteriuria   - Discontinue Rocephin  - As long as symptoms do not emerge, safe for discharge.      #C.diff v. gastroenteritis   - Encourage oral hydration  - Safe for discharge as long as patient remain afebrile, diarrhea resolves, and n/v does not develop.      This patient was seen and discussed with resident and attending physician.    Reynaldo Kc, MS3  Internal Medicine    Resident addendum:    Patient seen and examined independently of medical student.  The note is shown as above have been reviewed and edited to reflect my input.    Nash Mohamud, DO  Internal Medicine, PGY-3

## 2023-11-21 NOTE — ED PROVIDER NOTES
HPI   Chief Complaint   Patient presents with    Weakness, Gen       62-year-old female with past medical history significant for pulmonary hypertension, recurring hypoglycemia, weakness, lupus, who has been in and out of the hospital multiple times this year who presents to the emergency department with recurring weakness. Discussed with her son over the phone, who states she recently got approved for Dexcom, and they have had multiple episodes of hypoglycemia at home, including overnight last night when her sugars dropped into the 30s despite appropriate oral intake.  She is spent some time in rehab, and was starting to get stronger, and they have followed up outpatient with endocrine, who has not given them very many answers at this time.                           Nasim Coma Scale Score: 15                  Patient History   Past Medical History:   Diagnosis Date    Acute upper respiratory infection, unspecified 03/04/2020    Acute URI    Acute upper respiratory infection, unspecified 09/30/2015    URTI (acute upper respiratory infection)    Arthritis     Body mass index (BMI) 23.0-23.9, adult 10/15/2021    BMI 23.0-23.9, adult    Body mass index (BMI) 33.0-33.9, adult 03/04/2020    BMI 33.0-33.9,adult    Cardiomegaly 08/27/2013    Left ventricular hypertrophy    Chronic kidney disease, stage 3 unspecified (CMS/HCC) 07/02/2013    Chronic kidney disease, stage III (moderate)    Disease of pericardium, unspecified 07/02/2013    Pericardial disease    Encounter for follow-up examination after completed treatment for conditions other than malignant neoplasm 10/06/2022    Hospital discharge follow-up    Generalized contraction of visual field, right eye 01/29/2015    Generalized contraction of visual field of right eye    Homonymous bilateral field defects, right side 04/29/2016    Homonymous bilateral field defects of right side    Hypertensive chronic kidney disease with stage 1 through stage 4 chronic kidney  disease, or unspecified chronic kidney disease 07/02/2013    Nephrosclerosis    Laceration without foreign body, left foot, initial encounter 07/03/2018    Foot laceration, left, initial encounter    Migraine with aura, not intractable, without status migrainosus 10/24/2022    Ocular migraine    Other conditions influencing health status 07/02/2013    Chronic Glomerulonephritis In Diseases Classified Elsewhere    Other conditions influencing health status 07/02/2013    Progressive Familial Myoclonic Epilepsy    Other conditions influencing health status 07/02/2013    Protein S Deficiency    Other conditions influencing health status 05/22/2015    Familial Combined Hyperlipidemia    Other conditions influencing health status 10/24/2022    IDDM (insulin dependent diabetes mellitus)    Other conditions influencing health status 03/14/2022    Diabetes mellitus, insulin dependent (IDDM), uncontrolled    Other long term (current) drug therapy 10/24/2022    Long-term use of Plaquenil    Personal history of diseases of the blood and blood-forming organs and certain disorders involving the immune mechanism 07/02/2013    History of thrombocytopenia    Personal history of diseases of the skin and subcutaneous tissue 08/11/2015    History of foot ulcer    Personal history of nephrotic syndrome 07/02/2013    History of nephrotic syndrome    Personal history of other diseases of the circulatory system 08/27/2013    History of sinus tachycardia    Personal history of other diseases of the nervous system and sense organs     History of cataract    Personal history of other diseases of the respiratory system     History of bronchitis    Personal history of other infectious and parasitic diseases 07/02/2013    History of hepatitis    Personal history of other specified conditions     History of shortness of breath    Personal history of other specified conditions 08/27/2013    History of edema    Puckering of macula, right eye  10/24/2022    ERM OD (epiretinal membrane, right eye)    Raynaud's syndrome without gangrene 07/02/2013    Raynaud's disease    Systemic lupus erythematosus, unspecified (CMS/HCC) 07/24/2015    SLE (systemic lupus erythematosus)    Systemic lupus erythematosus, unspecified (CMS/HCC) 07/24/2015    SLE (systemic lupus erythematosus)    Systemic lupus erythematosus, unspecified (CMS/HCC) 07/24/2015    Systemic lupus    Type 2 diabetes mellitus with diabetic nephropathy (CMS/HCC) 07/02/2013    Type 2 diabetes with nephropathy    Type 2 diabetes mellitus with mild nonproliferative diabetic retinopathy without macular edema, left eye (CMS/HCC) 07/27/2015    Non-proliferative diabetic retinopathy, left eye    Type 2 diabetes mellitus with mild nonproliferative diabetic retinopathy without macular edema, unspecified eye (CMS/HCC) 07/24/2015    Mild non proliferative diabetic retinopathy    Type 2 diabetes mellitus with proliferative diabetic retinopathy without macular edema, right eye (CMS/HCC) 07/27/2015    Proliferative diabetic retinopathy of right eye    Type 2 diabetes mellitus with proliferative diabetic retinopathy without macular edema, unspecified eye (CMS/HCC) 07/24/2015    Diabetic proliferative retinopathy    Unspecified acute and subacute iridocyclitis 07/24/2015    Acute iritis, right eye    Unspecified open wound, left foot, sequela 07/03/2018    Wound, open, foot, left, sequela     Past Surgical History:   Procedure Laterality Date    ANKLE SURGERY  01/29/2015    Ankle Surgery    CHOLECYSTECTOMY  01/29/2015    Cholecystectomy    CT GUIDED PERCUTANEOUS BIOPSY BONE DEEP  5/4/2021    CT GUIDED PERCUTANEOUS BIOPSY BONE DEEP 5/4/2021 [facility] CLINICAL LEGACY    EYE SURGERY  03/06/2015    Eye Surgery    FOOT SURGERY  01/29/2015    Foot Surgery    MR HEAD ANGIO WO IV CONTRAST  7/26/2013    MR HEAD ANGIO WO IV CONTRAST 7/26/2013 [facility] CLINICAL LEGACY    MR HEAD ANGIO WO IV CONTRAST  9/17/2021    MR HEAD ANGIO WO IV  CONTRAST 9/17/2021 [facility]    MR HEAD ANGIO WO IV CONTRAST  3/25/2023    MR HEAD ANGIO WO IV CONTRAST [facility]    MR NECK ANGIO WO IV CONTRAST  7/26/2013    MR NECK ANGIO WO IV CONTRAST 7/26/2013 [facility] CLINICAL LEGACY    MR NECK ANGIO WO IV CONTRAST  9/17/2021    MR NECK ANGIO WO IV CONTRAST 9/17/2021 [facility] EMERGENCY LEGACY    MR NECK ANGIO WO IV CONTRAST  3/25/2023    MR NECK ANGIO WO IV CONTRAST [facility]    OTHER SURGICAL HISTORY  01/29/2015    Creation Of Pericardial Window    OTHER SURGICAL HISTORY  01/29/2015    Quadricepsplasty    TOTAL HIP ARTHROPLASTY  01/29/2015    Hip Replacement     Family History   Problem Relation Name Age of Onset    Other (RENAL DISEASE) Mother          END STAGE    Other (CARDIAC DISORDER) Mother      Cataracts Mother      Stroke Mother      Diabetes Mother      Kidney disease Mother      Lupus Mother      Other (CARDIAC DISORDER) Father      COPD Father      Glaucoma Father      Hypertension Father      Sleep apnea Father      Other (CARDIAC DISORDER) Sister      Depression Sister      Kidney disease Sister      Sickle cell trait Sister      Sleep apnea Daughter       Social History     Tobacco Use    Smoking status: Never    Smokeless tobacco: Never   Vaping Use    Vaping Use: Never used   Substance Use Topics    Alcohol use: Never    Drug use: Never       Physical Exam   ED Triage Vitals [11/20/23 1246]   Temp Heart Rate Resp BP   36.1 °C (97 °F) 74 18 101/68      SpO2 Temp src Heart Rate Source Patient Position   95 % -- -- Sitting      BP Location FiO2 (%)     Left arm --       Physical Exam  Vitals and nursing note reviewed.   Constitutional:       General: She is not in acute distress.     Appearance: She is well-developed.   HENT:      Head: Normocephalic and atraumatic.   Eyes:      Conjunctiva/sclera: Conjunctivae normal.   Cardiovascular:      Rate and Rhythm: Normal rate and regular rhythm.      Heart sounds: No murmur heard.  Pulmonary:      Effort: Pulmonary  effort is normal. No respiratory distress.      Breath sounds: Normal breath sounds.   Abdominal:      Palpations: Abdomen is soft.      Tenderness: There is no abdominal tenderness.   Musculoskeletal:      Cervical back: Neck supple.      Right lower leg: Edema present.      Left lower leg: Edema present.   Skin:     General: Skin is warm and dry.      Capillary Refill: Capillary refill takes less than 2 seconds.   Neurological:      Mental Status: She is alert.      Motor: Weakness (Diffuse) present.         ED Course & MDM   ED Course as of 11/20/23 2008 Mon Nov 20, 2023   1603 Interpreted by the Emergency Department Attending: ECG revealed normal sinus rhythm at a rate of 67 beats per minute with ME interval 178 , QRS of 102 , QTc of 437. No appreciable ST elevations or depressions. Previous EKG on 10/19/2023 revealed no significant changes.    [MG]   1825 Discussed with the son, who expresses his concerns that she is typically able to get up and move around, and today was much weaker than normal [AS]      ED Course User Index  [AS] Michelle Farnsworth DO  [MG] Bacilio Villagomez DO         Diagnoses as of 11/20/23 2008   UTI (urinary tract infection)       Medical Decision Making  History obtained from: EMS reports, and the patient's son    External records reviewed: I reviewed external records including outpatient, PCP records, and prior discharge summaries    ED Course: Altered mental status work-up initiated.  Initial glucose was 58 and the patient was allowed to eat which helped her glucose trend upwards.   Discussions with the son regarding goals for long-term as we may not have clear answers as to what is truly driving this patient's general weakness, and what the family's goals are.  At this time, given that she is too weak to assist with transfers and is currently requiring more care than they are able to give her, family is requesting admission and placement at least for rehab, and possibly for longer  term care.  Laboratory studies are essentially at baseline, creatinine is 1.68 which is consistent with prior values around 1.6, she has pancytopenia, with a white cell count of 3.0, hemoglobin of 7.9, platelets of 89 also consistent with her prior values over the last few weeks.  Urinalysis is positive for 3+ leukocyte esterase, and pyuria.  Covered with Rocephin.  Admitted for further management I have reviewed this case with the ED attending physician, and the attending agrees with the plan. Patient or family was counselled regarding labs, imaging, likely diagnosis, and plan. All questions were answered.     Michelle Farnsworth DO  PGY-4, emergency medicine    The above documentation was completed with the use of speech recognition software. It may contain dictation errors secondary to limitations of the software.              Procedure  Procedures     Michelle Farnsworth DO  Resident  11/20/23 2014  ------------------------  I did evaluate the patient independently from the resident and was present for key medical decision making.  Agree with disposition.  Any discrepancies between residents findings are detailed below.  Bacilio Villagomez DO    CHIEF COMPLAINT       Chief Complaint   Patient presents with    Weakness, Gen       REVIEW OF SYSTEMS     CONSTITUTIONAL: Denies fever, sweats, chills.   NEURO: Endorses generalized weakness.  Denies difficulty walking, numbness, tingling, headache.   HEENT: Denies sore throat, rhinorrhea, changes in vision.   CARDIO: Denies chest pain, palpitations.  PULM: Denies shortness of breath, cough.   GI: Denies abdominal pain, nausea, vomiting, diarrhea, constipation, melena, hematochezia.  : Denies painful urination, frequency, hematuria.   MSK: Denies recent trauma.   SKIN: Denies rash, lesions.   ENDOCRINE: Denies unexpected weight-loss.   HEME: Denies bleeding disorder.     PAST MEDICAL HISTORY     Past Medical History:   Diagnosis Date    Acute upper respiratory infection,  unspecified 03/04/2020    Acute URI    Acute upper respiratory infection, unspecified 09/30/2015    URTI (acute upper respiratory infection)    Arthritis     Body mass index (BMI) 23.0-23.9, adult 10/15/2021    BMI 23.0-23.9, adult    Body mass index (BMI) 33.0-33.9, adult 03/04/2020    BMI 33.0-33.9,adult    Cardiomegaly 08/27/2013    Left ventricular hypertrophy    Chronic kidney disease, stage 3 unspecified (CMS/HCC) 07/02/2013    Chronic kidney disease, stage III (moderate)    Disease of pericardium, unspecified 07/02/2013    Pericardial disease    Encounter for follow-up examination after completed treatment for conditions other than malignant neoplasm 10/06/2022    Hospital discharge follow-up    Generalized contraction of visual field, right eye 01/29/2015    Generalized contraction of visual field of right eye    Homonymous bilateral field defects, right side 04/29/2016    Homonymous bilateral field defects of right side    Hypertensive chronic kidney disease with stage 1 through stage 4 chronic kidney disease, or unspecified chronic kidney disease 07/02/2013    Nephrosclerosis    Laceration without foreign body, left foot, initial encounter 07/03/2018    Foot laceration, left, initial encounter    Migraine with aura, not intractable, without status migrainosus 10/24/2022    Ocular migraine    Other conditions influencing health status 07/02/2013    Chronic Glomerulonephritis In Diseases Classified Elsewhere    Other conditions influencing health status 07/02/2013    Progressive Familial Myoclonic Epilepsy    Other conditions influencing health status 07/02/2013    Protein S Deficiency    Other conditions influencing health status 05/22/2015    Familial Combined Hyperlipidemia    Other conditions influencing health status 10/24/2022    IDDM (insulin dependent diabetes mellitus)    Other conditions influencing health status 03/14/2022    Diabetes mellitus, insulin dependent (IDDM), uncontrolled    Other long  term (current) drug therapy 10/24/2022    Long-term use of Plaquenil    Personal history of diseases of the blood and blood-forming organs and certain disorders involving the immune mechanism 07/02/2013    History of thrombocytopenia    Personal history of diseases of the skin and subcutaneous tissue 08/11/2015    History of foot ulcer    Personal history of nephrotic syndrome 07/02/2013    History of nephrotic syndrome    Personal history of other diseases of the circulatory system 08/27/2013    History of sinus tachycardia    Personal history of other diseases of the nervous system and sense organs     History of cataract    Personal history of other diseases of the respiratory system     History of bronchitis    Personal history of other infectious and parasitic diseases 07/02/2013    History of hepatitis    Personal history of other specified conditions     History of shortness of breath    Personal history of other specified conditions 08/27/2013    History of edema    Puckering of macula, right eye 10/24/2022    ERM OD (epiretinal membrane, right eye)    Raynaud's syndrome without gangrene 07/02/2013    Raynaud's disease    Systemic lupus erythematosus, unspecified (CMS/[facility]) 07/24/2015    SLE (systemic lupus erythematosus)    Systemic lupus erythematosus, unspecified (CMS/[facility]) 07/24/2015    SLE (systemic lupus erythematosus)    Systemic lupus erythematosus, unspecified (CMS/[facility]) 07/24/2015    Systemic lupus    Type 2 diabetes mellitus with diabetic nephropathy (CMS/[facility]) 07/02/2013    Type 2 diabetes with nephropathy    Type 2 diabetes mellitus with mild nonproliferative diabetic retinopathy without macular edema, left eye (CMS/[facility]) 07/27/2015    Non-proliferative diabetic retinopathy, left eye    Type 2 diabetes mellitus with mild nonproliferative diabetic retinopathy without macular edema, unspecified eye (CMS/HCC) 07/24/2015    Mild non proliferative diabetic retinopathy    Type 2 diabetes mellitus with  proliferative diabetic retinopathy without macular edema, right eye (CMS/[facility]) 07/27/2015    Proliferative diabetic retinopathy of right eye    Type 2 diabetes mellitus with proliferative diabetic retinopathy without macular edema, unspecified eye (CMS/[facility]) 07/24/2015    Diabetic proliferative retinopathy    Unspecified acute and subacute iridocyclitis 07/24/2015    Acute iritis, right eye    Unspecified open wound, left foot, sequela 07/03/2018    Wound, open, foot, left, sequela       SURGICAL HISTORY       Past Surgical History:   Procedure Laterality Date    ANKLE SURGERY  01/29/2015    Ankle Surgery    CHOLECYSTECTOMY  01/29/2015    Cholecystectomy    CT GUIDED PERCUTANEOUS BIOPSY BONE DEEP  5/4/2021    CT GUIDED PERCUTANEOUS BIOPSY BONE DEEP 5/4/2021 [facility] CLINICAL LEGACY    EYE SURGERY  03/06/2015    Eye Surgery    FOOT SURGERY  01/29/2015    Foot Surgery    MR HEAD ANGIO WO IV CONTRAST  7/26/2013    MR HEAD ANGIO WO IV CONTRAST 7/26/2013 [facility] CLINICAL LEGACY    MR HEAD ANGIO WO IV CONTRAST  9/17/2021    MR HEAD ANGIO WO IV CONTRAST 9/17/2021 [facility]ERGENCY LEGACY    MR HEAD ANGIO WO IV CONTRAST  3/25/2023    MR HEAD ANGIO WO IV CONTRAST [facility]    MR NECK ANGIO WO IV CONTRAST  7/26/2013    MR NECK ANGIO WO IV CONTRAST 7/26/2013 [facility] CLINICAL LEGACY    MR NECK ANGIO WO IV CONTRAST  9/17/2021    MR NECK ANGIO WO IV CONTRAST 9/17/2021 [facility] EMERGENCY LEGACY    MR NECK ANGIO WO IV CONTRAST  3/25/2023    MR NECK ANGIO WO IV CONTRAST [facility]    OTHER SURGICAL HISTORY  01/29/2015    Creation Of Pericardial Window    OTHER SURGICAL HISTORY  01/29/2015    Quadricepsplasty    TOTAL HIP ARTHROPLASTY  01/29/2015    Hip Replacement       ALLERGIES     Ace inhibitors, Hydroxychloroquine, Lisinopril, Penicillins, and Sulfa (sulfonamide antibiotics)    FAMILY HISTORY       Family History   Problem Relation Name Age of Onset    Other (RENAL DISEASE) Mother          END STAGE    Other (CARDIAC DISORDER) Mother      Cataracts  Mother      Stroke Mother      Diabetes Mother      Kidney disease Mother      Lupus Mother      Other (CARDIAC DISORDER) Father      COPD Father      Glaucoma Father      Hypertension Father      Sleep apnea Father      Other (CARDIAC DISORDER) Sister      Depression Sister      Kidney disease Sister      Sickle cell trait Sister      Sleep apnea Daughter          SOCIAL HISTORY       Social History     Socioeconomic History    Marital status:      Spouse name: None    Number of children: None    Years of education: None    Highest education level: None   Occupational History    None   Tobacco Use    Smoking status: Never    Smokeless tobacco: Never   Vaping Use    Vaping Use: Never used   Substance and Sexual Activity    Alcohol use: Never    Drug use: Never    Sexual activity: Defer   Other Topics Concern    None   Social History Narrative    None     Social Determinants of Health     Financial Resource Strain: Low Risk  (11/20/2023)    Overall Financial Resource Strain (CARDIA)     Difficulty of Paying Living Expenses: Not hard at all   Food Insecurity: Not on file   Transportation Needs: No Transportation Needs (11/20/2023)    PRAPARE - Transportation     Lack of Transportation (Medical): No     Lack of Transportation (Non-Medical): No   Physical Activity: Not on file   Stress: Not on file   Social Connections: Not on file   Intimate Partner Violence: Not on file   Housing Stability: Low Risk  (11/20/2023)    Housing Stability Vital Sign     Unable to Pay for Housing in the Last Year: No     Number of Places Lived in the Last Year: 1     Unstable Housing in the Last Year: No       DIAGNOSTIC RESULTS     RADIOLOGY:   Non-plain film images such as CT, Ultrasound and MRI are read by the radiologist. Plain radiographic images are visualized and preliminarily interpreted by the emergency physician with the below findings: X-ray without evidence of pneumonia.      Interpretation per the Radiologist below, if  available at the time of this note:    XR chest 1 view   Final Result   Stable mild enlargement of the cardiac silhouette. Mildly hypoinflated   lungs which appear clear. Calcified and tortuous aorta.    Signed by Alan Linton MD            ED BEDSIDE ULTRASOUND:   Performed by ED Physician - none    LABS:  Labs Reviewed   CBC WITH AUTO DIFFERENTIAL - Abnormal       Result Value    WBC 3.0 (*)     nRBC 1.0 (*)     RBC 2.66 (*)     Hemoglobin 7.9 (*)     Hematocrit 26.6 (*)           MCH 29.7      MCHC 29.7 (*)     RDW 20.9 (*)     Platelets 89 (*)     Immature Granulocytes %, Automated 0.7      Immature Granulocytes Absolute, Automated 0.02      Narrative:     The previously reported component Neutrophils % is no longer being reported.  The previously reported component Lymphocytes % is no longer being reported.  The previously reported component Monocytes % is no longer being reported.  The previously   reported component Eosinophils % is no longer being reported.  The previously reported component Basophils % is no longer being reported.  The previously reported component Absolute Neutrophils is no longer being reported.  The previously reported   component Absolute Lymphocytes is no longer being reported.  The previously reported component Absolute Monocytes is no longer being reported.  The previously reported component Absolute Eosinophils is no longer being reported.  The previously reported   component Absolute Basophils is no longer being reported.   COMPREHENSIVE METABOLIC PANEL - Abnormal    Glucose 71 (*)     Sodium 145      Potassium 4.5      Chloride 117 (*)     Bicarbonate 20 (*)     Anion Gap 13      Urea Nitrogen 53 (*)     Creatinine 1.68 (*)     eGFR 34 (*)     Calcium 8.2 (*)     Albumin 3.0 (*)     Alkaline Phosphatase 79      Total Protein 5.1 (*)     AST 28      Bilirubin, Total 0.2      ALT 24     URINALYSIS WITH REFLEX MICROSCOPIC AND CULTURE - Abnormal    Color, Urine Yellow       Appearance, Urine Hazy (*)     Specific Gravity, Urine 1.012      pH, Urine 5.0      Protein, Urine 100 (2+) (*)     Glucose, Urine NEGATIVE      Blood, Urine NEGATIVE      Ketones, Urine NEGATIVE      Bilirubin, Urine NEGATIVE      Urobilinogen, Urine <2.0      Nitrite, Urine NEGATIVE      Leukocyte Esterase, Urine LARGE (3+) (*)    MICROSCOPIC ONLY, URINE - Abnormal    WBC, Urine >50 (*)     RBC, Urine 1-2     POCT GLUCOSE - Abnormal    POCT Glucose 58 (*)    POCT GLUCOSE - Abnormal    POCT Glucose 70 (*)    POCT GLUCOSE - Abnormal    POCT Glucose 51 (*)    POCT GLUCOSE - Abnormal    POCT Glucose 131 (*)    POCT GLUCOSE - Abnormal    POCT Glucose 58 (*)    POCT GLUCOSE - Abnormal    POCT Glucose 65 (*)    POCT GLUCOSE - Abnormal    POCT Glucose 63 (*)    POCT GLUCOSE - Abnormal    POCT Glucose 128 (*)    SARS-COV-2 AND INFLUENZA A/B PCR - Normal    Flu A Result Not Detected      Flu B Result Not Detected      Coronavirus 2019, PCR Not Detected      Narrative:     This assay has received FDA Emergency Use Authorization (EUA) and  is only authorized for the duration of time that circumstances exist to justify the authorization of the emergency use of in vitro diagnostic tests for the detection of SARS-CoV-2 virus and/or diagnosis of COVID-19 infection under section 564(b)(1) of the Act, 21 U.S.C. 360bbb-3(b)(1). Testing for SARS-CoV-2 is only recommended for patients who meet current clinical and/or epidemiological criteria as defined by federal, state, or local public health directives. This assay is an in vitro diagnostic nucleic acid amplification test for the qualitative detection of SARS-CoV-2, Influenza A, and Influenza B from nasopharyngeal specimens and has been validated for use at [facility]. Negative results do not preclude COVID-19 infections or Influenza A/B infections, and should not be used as the sole basis for diagnosis, treatment, or other management decisions. If Influenza  A/B and RSV PCR results are negative, testing for Parainfluenza virus, Adenovirus and Metapneumovirus is routinely performed for [facility] pediatric oncology and intensive care inpatients, and is available on other patients by placing an add-on request.    RSV PCR - Normal    RSV PCR Not Detected      Narrative:     This assay is an FDA-cleared, in vitro diagnostic nucleic acid amplification test for the detection of RSV from nasopharyngeal specimens, and has been validated for use at [facility]. Negative results do not preclude RSV infections, and should not be used as the sole basis for diagnosis, treatment, or other management decisions. If Influenza A/B and RSV PCR results are negative, testing for Parainfluenza virus, Adenovirus and Metapneumovirus is routinely performed for pediatric oncology and intensive care inpatients at [facility], and is available on other patients by placing an add-on request.       C-REACTIVE PROTEIN - Normal    C-Reactive Protein 0.88     SEDIMENTATION RATE, AUTOMATED - Normal    Sedimentation Rate 26     POCT GLUCOSE - Normal    POCT Glucose 88     URINE CULTURE   C. DIFFICILE, PCR   STOOL PATHOGEN PANEL, PCR   URINALYSIS WITH REFLEX MICROSCOPIC AND CULTURE    Narrative:     The following orders were created for panel order Urinalysis with Reflex Microscopic and Culture.  Procedure                               Abnormality         Status                     ---------                               -----------         ------                     Urinalysis with Reflex M...[[id number]]  Abnormal            Final result               Extra Urine Gray Tube[[id number]]                            Final result                 Please view results for these tests on the individual orders.   EXTRA URINE GRAY TUBE    Extra Tube Hold for add-ons.     ANTI-DNA ANTIBODY, DOUBLE-STRANDED   CORTISOL AM   ADRENOCORTICOTROPIC HORMONE (ACTH)   CBC WITH AUTO DIFFERENTIAL   BASIC METABOLIC PANEL  "  MAGNESIUM   POCT GLUCOSE METER   POCT GLUCOSE METER   POCT GLUCOSE METER   POCT GLUCOSE METER   MANUAL DIFFERENTIAL    Neutrophils %, Manual 44.0      Bands %, Manual 2.0      Lymphocytes %, Manual 49.0      Monocytes %, Manual 5.0      Eosinophils %, Manual 0.0      Basophils %, Manual 0.0      Seg Neutrophils Absolute, Manual 1.32      Bands Absolute, Manual 0.06      Lymphocytes Absolute, Manual 1.47      Monocytes Absolute, Manual 0.15      Eosinophils Absolute, Manual 0.00      Basophils Absolute, Manual 0.00      Total Cells Counted 100      Neutrophils Absolute, Manual 1.38      RBC Morphology See Below      RBC Fragments Few      Ovalocytes Few      Katarzyna Cells Few         All other labs were within normal range or not returned as of this dictation.    EMERGENCY DEPARTMENT COURSE/MDM:   Vitals:    Vitals:    11/20/23 1604 11/20/23 1829 11/20/23 2000 11/21/23 0000   BP: 101/56 97/54 95/64 110/68   BP Location:  Right arm Right arm Right arm   Patient Position:  Sitting Lying Lying   Pulse: 64 65 60 64   Resp: 18 16 16 16   Temp:   35.6 °C (96.1 °F) 35.8 °C (96.4 °F)   TempSrc:   Temporal Temporal   SpO2: 97% 98% 100% 94%   Weight:   94.8 kg (208 lb 15.9 oz)    Height:   1.803 m (5' 11\")    Patient is a 62-year-old female overall well-appearing was initially slightly confused alert and oriented to person time and place with GCS of 14 although found to be slightly hypoglycemic was given oral replenishment she appears to be mentating appropriately at this time alert and oriented to person time place.  She reports that she has been having generalized weakness they have been working her up for hypoglycemia although unclear etiology at this time.  She does report that she has been eating and drinking appropriately.  She has no confusion on examination at this time there is no indication for CT imaging of the head lab work all appears to be close to baseline there is a slight leukopenia without evidence of " infectious etiology at this time her hemoglobin/anemia is at baseline she has no active signs of bleeding and hemodynamically stable per historical data it appears that her blood pressures typically do run slightly low.  Renal function is at baseline no significant electrolyte derangements.  Urinalysis did show signs consistent with potential UTI therefore Rocephin was ordered.  Suspect her generalized weakness may be multifactorial secondary to the questionable UTI as well as is intermittent hypoglycemia she will be admitted to the hospitalist service for further treatment and evaluation per the request of her son as he is concerned that they do not have the means to care for her with the generalized weakness at this time.  Admitted in hemodynamically stable condition mentating appropriately.    Patient was counseled regarding labs, imaging, likely diagnosis, and plan. All questions were answered.   ------------------------------------------------------------------  ED Medications administered this visit:    Medications   cefTRIAXone (Rocephin) IVPB 1 g (0 g intravenous Stopped 11/20/23 3329)        Final Impression:   1. UTI (urinary tract infection)          Bacilio Villagomez DO    (Please note that portions of this note were completed with a voice recognition program.  Efforts were made to edit the dictations but occasionally words are mis-transcribed.)       Bacilio Villagomez DO  11/21/23 9862

## 2023-11-21 NOTE — CONSULTS
Endocrinology Initial Inpatient Consult Note     PATIENT NAME: Bing Holliday  MRN: [id number]  DATE: 11/21/2023    CONSULTING PHYSICIAN: Dr. BRIAN Wren  REASON FOR CONSULT: AI. T2DM.      History of Presenting Illness     62y F w. PMHx of:      # Systemic Lupus Erythematous c/b Cerebritis and Jaccoud's Arthropathy      # Uncontrolled T2DM      # Hx of C Difficile Infection (5/23)      # Stage IV CKD      # Paroxysmal Atrial Fibrillation      # Heart Failure with Reduced LVEF      # Osteoporosis      # GERD      # COPD      # pHTN      # HTN      # HLD    Patient presented [facility] on 11/20 complaining of worsening weakness, low blood sugars, and altered mental status.  Patient reportedly saw that she had a blood sugar of 30 mg/dL on her home continuous glucose monitor.  Patient also reports that she is having loose stools at home.  She says that they are not watery per se but she just feels like she has been having worsening nausea and vomiting.  She reports that she has been having abdominal pain for the last 3 days as well.  Unable to eat really.  Unable to keep anything down.  The patient does report that she has been taking her prednisone.    In the ER, glucose was found to be 58 mg/dL.  This improved to 70 mg/dL.  CBC was remarkable for pancytopenia.  The patient had a normocytic hypochromic anemia with a hemoglobin of 7.9 g/dL.  CMP was remarkable for a metabolic acidosis with a bicarbonate level of 20.  The patient serum creatinine was elevated to 1.68.  CRP and ESR were normal.  Nucleic acid amplification test for SARS-CoV-2 as well as influenza was negative.  RSV PCR was also negative.  Urinalysis was positive for leukocyte Estrace but negative for nitrite.  The patient did have greater than 50 white blood cells in her urine.  Urine culture is growing gram-negative bacilli.  C. difficile PCR was negative, but she was found to be positive for norovirus..  Glucose on BMP from 653 this morning was found  NEUROPSYCHOLOGICAL EVALUATION    CONSULTATION INFORMATION:  The patient is a 78-year-old, right-handed,  man who complains of mild cognitive decline.  Dr. Althea Montes De Oca requested a neuropsychological consultation to assess higher cognitive function and psychological status.  With the patient's consent, a clinical interview and neuropsychological testing were completed on 2023.  As his history is described in prior records, it will only be briefly reviewed in this report.    BACKGROUND INFORMATION:  The patient was most concerned about memory, stating that he struggles to recall acquaintances that he used to know and has trouble learning the names of people in his new Yaz[employer].  He also complained of short-term memory issues and said that he frequently forgets information he has just been told such as the time of his appointments.  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  When queried further, he noted possible slowed processing speed.  He first observed cognitive issues about 3-4 years ago, and he believes that he is stable.  He is concerned about having dementia because his paternal grandfather, father, and 2 paternal uncles reportedly had dementia.    The patient reported left-eye vision problems, fatigue, and bilateral leg and knee pain.  His medical history is reportedly positive for atrial fibrillation, atrial flutter with rapid ventricular response, nonrheumatic mitral regurgitation, acute on chronic diastolic congestive heart failure, hypertension, hyperlipidemia, sleep apnea, and asthma.  Additional medical history and his medications are listed in his electronic records.    In the patient's immediate family, his  father reportedly had a history of heart problems and dementia.  The patient's mother allegedly  from complications of acid reflux.  His sister  from complications of  "to be 81 mg/dL.  Plain radiograph of the chest showed no acute osseous changes.  The patient underwent cross-sectional imaging of the abdomen and pelvis which showed no evidence of bowel obstruction or acute appendicitis.  There was diverticulosis.    In regards to her adrenal status, the patient reports that she has been on steroids her whole life.  She states that she is been intermittently on and off prednisone.  She states that she follows with Dr. Chi.  Reports that he started her on prednisone about a month ago.  She states that she takes 10 mg in the morning.  Patient cannot tell me the last time she was on prednisone prior to a month ago.  She states that she does feel better on prednisone.  The patient also reports that she has hypothyroidism and reports that she is on levothyroxine.  Patient also states that she takes calcium later in the day.      Review of Systems (Bold if Positive)     General: Fevers, Chills, Weight Loss, or Night Sweats  HEENT: Headaches or Blurry Vision  Cardiovascular: CP, Palpitations, Diaphoresis, or Peripheral Edema  Respiratory: Cough, Shortness of Breath, or Hemoptysis  Gastrointestinal: N/V, Abdominal Pain, Constipation, Diarrhea, Melena, Hematochezia  Genitourinary: Polyruia, Dysuria, Urgency   Endo: Polydipsia, Heat/Cold Intolerance, Hair/Skin/Nail Changes  Rheum: Joint Pain   MSK: LBP, Muscle Pain  Psych: Anxiety, Depression      Physical Examination     /69 (BP Location: Right arm, Patient Position: Lying)   Pulse 63   Temp 36.7 °C (98.1 °F) (Temporal)   Resp 16   Ht 1.803 m (5' 11\")   Wt 94.8 kg (208 lb 15.9 oz)   LMP  (LMP Unknown)   SpO2 98%   BMI 29.15 kg/m²   General: No acute distress. A&Ox3.  Temporal wasting.  HEENT: PERRLA. EOMi. Ø Exophthalmos   Neck: No LAD.  Thyroid: 20g Ø Bruit  CV: Normal rate and rhythm. No murmurs or rubs auscultated.   Resp: CTA b/l. No wheezing or crackles.   Abdomen: Generalized abdominal pain.  No rebound " diabetes.  His family psychiatric history is reportedly positive for his sister being “mentally slow” but he denied any other significant behavioral health issues.    The patient reported that his mood is “fairly good” despite his medical problems and getting older.  He denied any significant psychiatric symptoms.  He denied any prior mental health involvement or treatment.  He denied any past or current suicidal or homicidal ideation.  He denied any history of abuse.  He denied any history of alcohol or drug problems.  He does not smoke tobacco.    SOCIAL HISTORY:  The patient has been  for 57 years, and he currently lives with his wife in a [facility] condo.  He conveyed that he is independent in his activities of daily living.  He said that he has 2 sons, 2 daughters, 4 grandchildren, and 1 great-grandchild.    The patient reported that he graduated from high school in 1964, and he denied any history of learning difficulties.  He completed an apprenticeship at [employer] and later completed additional courses in computer assisted design (CAD).    The patient said that he worked full-time as a  and CAD before retiring when he was nearly 63 years old.  He stated that he was with his company for over 40 years.  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    The patient stated that he is driving with only a corrective lens restriction, and he denied any driving problems.  He denied any history of legal difficulties.      BEHAVIORAL OBSERVATIONS:  The patient was alert and oriented to person and time but not fully to place.  He was adequately groomed and dressed, and he appeared his stated age.  Gait and balance were normal with no evidence of difficulties with gross or fine motor function.  His speech was normal in rate, rhythm, and volume.  Fluency was normal with no major errors in conversation.   Comprehension appeared to be intact.  Thought processes were logical and coherent with no evidence of gross distortion. His mood was euthymic, and his affect was appropriate to circumstances.  He was friendly with the examiner and cooperative with the evaluation.  Attention, effort, motivation, and frustration tolerance appeared to be adequate.    RESULTS SUMMARY:  The patient's gross mental status was mildly below expectations (MMSE 24/30).  He had difficulty with orientation, delayed recall, mental calculations, and repetition.  His clock drawing performance was intact.    The patient scored in the average range on a word recognition test, and this suggests average premorbid function consistent with his educational and occupational history.    The patient's overall performance on a neuropsychological screening measure was in the low average range, and this is mildly below his estimated average premorbid function.    The patient's immediate memory index was in the borderline range.  He scored in the borderline range on story memory and the low average range on word list learning.    The patient's delayed memory index was in the extremely low range, and this represented his weakest performance.  Figure and story recall were in the extremely low range, and word list recall and recognition were in the low average range.    The patient's visual-spatial/constructional index was in the superior range.  Judgment of line orientation in space was in the high average range and figure copy was in the superior range.    The patient's language index was in the low average range.  Semantic fluency was in the borderline range and confrontation naming was in the average range.    The patient's attention index was in the borderline range.  Digit span was in the extremely low range and symbol coding was in the low average range.    The patient's attention and processing speed scores ranged from borderline to average.  Verbal fluency  tenderness.  Extremities: Trace pedal edema b/l.  Neuro: CN II-XII Grossly Intact. Ø Tremor. Brisk Reflexes.   Psych: Appropriate affect  Skin: No apparent rashes      Past Medical / Surgical / Family / Social History     Past Medical History:   Diagnosis Date    Acute upper respiratory infection, unspecified 03/04/2020    Acute URI    Acute upper respiratory infection, unspecified 09/30/2015    URTI (acute upper respiratory infection)    Arthritis     Body mass index (BMI) 23.0-23.9, adult 10/15/2021    BMI 23.0-23.9, adult    Body mass index (BMI) 33.0-33.9, adult 03/04/2020    BMI 33.0-33.9,adult    Cardiomegaly 08/27/2013    Left ventricular hypertrophy    Chronic kidney disease, stage 3 unspecified (CMS/HCC) 07/02/2013    Chronic kidney disease, stage III (moderate)    Disease of pericardium, unspecified 07/02/2013    Pericardial disease    Encounter for follow-up examination after completed treatment for conditions other than malignant neoplasm 10/06/2022    Hospital discharge follow-up    Generalized contraction of visual field, right eye 01/29/2015    Generalized contraction of visual field of right eye    Homonymous bilateral field defects, right side 04/29/2016    Homonymous bilateral field defects of right side    Hypertensive chronic kidney disease with stage 1 through stage 4 chronic kidney disease, or unspecified chronic kidney disease 07/02/2013    Nephrosclerosis    Laceration without foreign body, left foot, initial encounter 07/03/2018    Foot laceration, left, initial encounter    Migraine with aura, not intractable, without status migrainosus 10/24/2022    Ocular migraine    Other conditions influencing health status 07/02/2013    Chronic Glomerulonephritis In Diseases Classified Elsewhere    Other conditions influencing health status 07/02/2013    Progressive Familial Myoclonic Epilepsy    Other conditions influencing health status 07/02/2013    Protein S Deficiency    Other conditions  for category was in the borderline range and verbal fluency for letter was in the average range.  Mental processing speed was in the average range and set-shifting was in the low average range.    The patient's complex problem-solving scores fluctuated from extremely low to average.  On the first measure, he had significant difficulty learning from errors and recognizing patterns with his score in the extremely low range.  On the second measure, he demonstrated the ability to recognize patterns and learn from errors, with his scores in the low average to high average range.    The patient's score on a measure of performance validity was below the cutoff, and this suggests adequate engagement during the evaluation.    The patient's responses to 2 self-report measures of mood revealed minimal symptoms of depression and anxiety.    IMPRESSION:  The patient is a 78-year-old, right-handed,  man who complains of mild cognitive decline, and he was most concerned about memory and processing speed.  He said that he no difficulties with his activities of daily living.  His medical history is reportedly positive for atrial fibrillation, atrial flutter with rapid ventricular response, nonrheumatic mitral regurgitation, acute on chronic diastolic congestive heart failure, hypertension, hyperlipidemia, sleep apnea, and asthma.  His psychiatric history is reportedly noncontributory.  Behavioral observations and a performance validity measure suggest adequate engagement and current test findings are considered a valid measure of his neuropsychological function.      The patient's gross mental status was mildly below expectations, but his clock drawing performance was intact.  His overall performance on a neuropsychological screening measure was in the low average range, and this is mildly below his estimated average premorbid function.  His strongest asset was his superior visual-spatial/constructional index.  His language  influencing health status 05/22/2015    Familial Combined Hyperlipidemia    Other conditions influencing health status 10/24/2022    IDDM (insulin dependent diabetes mellitus)    Other conditions influencing health status 03/14/2022    Diabetes mellitus, insulin dependent (IDDM), uncontrolled    Other long term (current) drug therapy 10/24/2022    Long-term use of Plaquenil    Personal history of diseases of the blood and blood-forming organs and certain disorders involving the immune mechanism 07/02/2013    History of thrombocytopenia    Personal history of diseases of the skin and subcutaneous tissue 08/11/2015    History of foot ulcer    Personal history of nephrotic syndrome 07/02/2013    History of nephrotic syndrome    Personal history of other diseases of the circulatory system 08/27/2013    History of sinus tachycardia    Personal history of other diseases of the nervous system and sense organs     History of cataract    Personal history of other diseases of the respiratory system     History of bronchitis    Personal history of other infectious and parasitic diseases 07/02/2013    History of hepatitis    Personal history of other specified conditions     History of shortness of breath    Personal history of other specified conditions 08/27/2013    History of edema    Puckering of macula, right eye 10/24/2022    ERM OD (epiretinal membrane, right eye)    Raynaud's syndrome without gangrene 07/02/2013    Raynaud's disease    Systemic lupus erythematosus, unspecified (CMS/[facility]) 07/24/2015    SLE (systemic lupus erythematosus)    Systemic lupus erythematosus, unspecified (CMS/[facility]) 07/24/2015    SLE (systemic lupus erythematosus)    Systemic lupus erythematosus, unspecified (CMS/[facility]) 07/24/2015    Systemic lupus    Type 2 diabetes mellitus with diabetic nephropathy (CMS/[facility]) 07/02/2013    Type 2 diabetes with nephropathy    Type 2 diabetes mellitus with mild nonproliferative diabetic retinopathy without macular  edema, left eye (CMS/HCC) 07/27/2015    Non-proliferative diabetic retinopathy, left eye    Type 2 diabetes mellitus with mild nonproliferative diabetic retinopathy without macular edema, unspecified eye (CMS/HCC) 07/24/2015    Mild non proliferative diabetic retinopathy    Type 2 diabetes mellitus with proliferative diabetic retinopathy without macular edema, right eye (CMS/HCC) 07/27/2015    Proliferative diabetic retinopathy of right eye    Type 2 diabetes mellitus with proliferative diabetic retinopathy without macular edema, unspecified eye (CMS/HCC) 07/24/2015    Diabetic proliferative retinopathy    Unspecified acute and subacute iridocyclitis 07/24/2015    Acute iritis, right eye    Unspecified open wound, left foot, sequela 07/03/2018    Wound, open, foot, left, sequela     Past Surgical History:   Procedure Laterality Date    ANKLE SURGERY  01/29/2015    Ankle Surgery    CHOLECYSTECTOMY  01/29/2015    Cholecystectomy    CT GUIDED PERCUTANEOUS BIOPSY BONE DEEP  5/4/2021    CT GUIDED PERCUTANEOUS BIOPSY BONE DEEP 5/4/2021 [facility] CLINICAL LEGACY    EYE SURGERY  03/06/2015    Eye Surgery    FOOT SURGERY  01/29/2015    Foot Surgery    MR HEAD ANGIO WO IV CONTRAST  7/26/2013    MR HEAD ANGIO WO IV CONTRAST 7/26/2013 [facility] CLINICAL LEGACY    MR HEAD ANGIO WO IV CONTRAST  9/17/2021    MR HEAD ANGIO WO IV CONTRAST 9/17/2021 [facility]    MR HEAD ANGIO WO IV CONTRAST  3/25/2023    MR HEAD ANGIO WO IV CONTRAST [facility]    MR NECK ANGIO WO IV CONTRAST  7/26/2013    MR NECK ANGIO WO IV CONTRAST 7/26/2013 [facility] CLINICAL LEGACY    MR NECK ANGIO WO IV CONTRAST  9/17/2021    MR NECK ANGIO WO IV CONTRAST 9/17/2021 [facility]    MR NECK ANGIO WO IV CONTRAST  3/25/2023    MR NECK ANGIO WO IV CONTRAST [facility]    OTHER SURGICAL HISTORY  01/29/2015    Creation Of Pericardial Window    OTHER SURGICAL HISTORY  01/29/2015    Quadricepsplasty    TOTAL HIP ARTHROPLASTY  01/29/2015    Hip Replacement     Family History  index was in the low average range.  His immediate memory and attention indices were in the borderline range.  His delayed memory index was in the extremely low range, and this represented his weakest performance.  Attention and processing speed scores varied from borderline to average.  Complex problem-solving scores fluctuated from extremely low to high average.  Self-report measures of mood revealed minimal symptoms of depression and anxiety.    In summary, the patient demonstrated significant difficulties with delayed memory along with moderate difficulties with immediate memory and attention and variable difficulties with processing speed and problem-solving.  His mood does not appear to contribute to these findings.  As he is reportedly independent in his activities of daily living and working without difficulty, he currently meets the diagnostic criteria for mild neurocognitive disorder or mild cognitive impairment (MCI), and this places him at increased risk for developing dementia.  Prodromal Alzheimer's disease is considered most likely given his neuropsychological profile, although other potential etiologies should be ruled out, particularly given his multiple vascular risk factors.    It is recommended that the patient continues to follow up with his physicians for the management of his healthcare and to rule out potentially reversible causes for cognitive decline.  In the absence of significant new neurologic findings, pharmacological management of his cognitive decline warrants consideration.  Long-term planning is recommended for the patient and his family including, but not limited to, a review of his power of  for healthcare and finances, living will, personal will, available services as needed, and alternative living arrangements as necessary.  There are no clear indications that he cannot drive safely from a cognitive standpoint at this time, but monitoring of his driving is recommended  "  Problem Relation Name Age of Onset    Other (RENAL DISEASE) Mother          END STAGE    Other (CARDIAC DISORDER) Mother      Cataracts Mother      Stroke Mother      Diabetes Mother      Kidney disease Mother      Lupus Mother      Other (CARDIAC DISORDER) Father      COPD Father      Glaucoma Father      Hypertension Father      Sleep apnea Father      Other (CARDIAC DISORDER) Sister      Depression Sister      Kidney disease Sister      Sickle cell trait Sister      Sleep apnea Daughter       Social History     Socioeconomic History    Marital status:      Spouse name: Not on file    Number of children: Not on file    Years of education: Not on file    Highest education level: Not on file   Occupational History    Not on file   Tobacco Use    Smoking status: Never    Smokeless tobacco: Never   Vaping Use    Vaping Use: Never used   Substance and Sexual Activity    Alcohol use: Never    Drug use: Never    Sexual activity: Defer   Other Topics Concern    Not on file   Social History Narrative    Not on file     Social Determinants of Health     Financial Resource Strain: Low Risk  (11/20/2023)    Overall Financial Resource Strain (CARDIA)     Difficulty of Paying Living Expenses: Not hard at all   Food Insecurity: Not on file   Transportation Needs: No Transportation Needs (11/20/2023)    PRAPARE - Transportation     Lack of Transportation (Medical): No     Lack of Transportation (Non-Medical): No   Physical Activity: Not on file   Stress: Not on file   Social Connections: Not on file   Intimate Partner Violence: Not on file   Housing Stability: Low Risk  (11/20/2023)    Housing Stability Vital Sign     Unable to Pay for Housing in the Last Year: No     Number of Places Lived in the Last Year: 1     Unstable Housing in the Last Year: No       Medications & Allergies     MAR and PTA Meds Reviewed     Allergies   Allergen Reactions    Ace Inhibitors Other, Angioedema and Swelling     \"FACIAL " and he and his family should plan for his eventual FCI from driving if further cognitive decline is reported.  If assistance is needed for planning, consultation with the Alzheimer's Association and/or the [facility] may be helpful for information, resources, and support.    The above impressions are best interpreted in the context of other medical evaluations.  Impressions and recommendations were discussed with the patient in person, and his wife participated via telephone with his permission.    Thank you for this consultation request.  If there are any questions or concerns, please contact the neuropsychology service at [phone].    Neurobehavioral status examination portion of the neuropsychological evaluation: 35 minutes.    Neuropsychologist time including medical record review, test selection, data integration/interpretation, decision making, feedback, and report writin minutes.    Psychometrist time with the patient for neuropsychological testing and scorin minutes.   "TWISTING\"    'FACIAL TWISTING\" \"LOOKS LIKE I HAD A STROKE IN MY SLEEP\"    Hydroxychloroquine Unknown     RETINAL BLEEDING    RETINAL BLEEDING, Eye problems    Lisinopril Swelling     PT. CLAIMS SWOLLEN FACE    Penicillins Unknown     tolerates cephalosporins    From Childhood    Mbr sts that she was told since young that she is allergic    Sulfa (Sulfonamide Antibiotics) Hives       Data     Recent Labs and Imaging Reviewed      Assessment / Plan       # Hypoglycemia  Seen on CMP at the arrival in the ER.  Reviewed her medication reconciliation here and on care everywhere.  The patient follows with Dr. Chi, endocrinology in [place].  There has been no recurrence of the patient's hypoglycemia since she has been receiving steroids.  I do suspect that the etiology of her hypoglycemia secondary to hypocortisolism.  As discussed below, the patient at least has secondary adrenal insufficiency.  The patient is marked hypoalbuminemia.  This could be secondary to lupus nephropathy as well as severe protein calorie malnutrition.  The patient also does have temporal wasting.  Given her likely decreased nutrition status compounded with hypoadrenalism, I think her glycogen stores are quite depleted leading to hypoglycemia.  Her most recent A1c was found to be 6.9% so she does have some impaired glucose tolerance.  I suspect if she were to take her glucocorticoids as directed, this would not be a recurring problem.  Of note, C-peptide values are not very interpretable in the setting of hypoglycemia unless the patient has concurrent hypoglycemia captured on chemistry.  Given that she is now appropriately maintained on glucocorticoids, I think were going to have a hard time capturing another episode of hypoglycemia.  If she were to have hypoglycemia, we can attempt to obtain a chemistry, beta hydroxybutyrate, and C-peptide.    # Uncontrolled Type 2 Diabetes Mellitus  Home Regimen: None.  Hemoglobin A1c (7/23): 6.9%  Nutrtion: " Regular Diet.    Given that the patient presented with hypoglycemia, hold all therapies.  Continue to check her blood sugars with meals and at bedtime.  Can check as needed when she does have symptoms attributable to hypoglycemia.  No indication for any therapies as an outpatient either.    # Adrenal Insufficiency  Likely secondary in nature due to chronic glucocorticoid use for SLE.  The patient did have a cortisol level drawn the morning of arrival.  This unfortunately cannot be interpreted given that she had received glucocorticoids prior to the draw.  Furthermore the patient has been maintained on supraphysiologic glucocorticoids for greater than 3 weeks.  By definition she does have at least secondary adrenal insufficiency.  Patient told me that she takes 10 mg of prednisone in the morning-though she was very unsure.  I do question her compliance, the patient started to answer in the affirmative for most if not all of my questions.  I even asked her if she was on levothyroxine and she answered in the affirmative, though she has never been on this medication.  In review of the chart, the patient was prescribed prednisone 5 mg 3 times daily by her endocrinologist.  This is not very physiologic.  I do note the primary attending's concern with compliance.  I share the same concerns.  As such I think would probably be easier for her to just take 10 mg of prednisone in the morning.  Prednisone is a 24-hour glucocorticoid and thus can be taken once daily.  In regards to her fludrocortisone, I doubt that she does have primary adrenal insufficiency.  She did have an ACTH level back in December 2022 which was low at 4.5.  This was drawn at 1056 in the morning.  This points more towards secondary adrenal insufficiency.  From my perspective I favor discontinuation of fludrocortisone.  I did  the patient about sick day rules as well as wearing a medical alert bracelet signifying her adrenal insufficiency.    #  Osteoporosis  In the setting of chronic glucocorticoid use.  This will be further managed as an outpatient.  She probably would benefit from a bisphosphonate.       Avi Sloan, DO  Endocrinology, Diabetes, and Metabolism    Available via [employer] Messenger    Please excuse and typographical or unwanted errors within this documentation as voice recognition software was used to dictate this note.

## 2023-11-21 NOTE — H&P
INTERNAL MEDICINE HISTORY AND PHYSICAL  TEACHING SERVICE - [facility] TE[employer]    Subjective   Bing Holliday  is a 62 y.o. 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     HPI:  The pt reports worsening generalized weakness over the past day as well as low blood sugars overnight. She states her son brought her to the ER with concerns for confusion as well. She has experienced these symptoms before but is unsure of what triggers them. She does endorse recent nausea, vomiting, and profuse diarrhea several days ago. She takes her medications as prescribed but states she did not take them last night. She denies fevers, chills, headaches, chest pain, or shortness of breath. She does admit to frequent diarrhea and some dysuria over the last few days, and has intermittent urinary and bowel incontinence.   She lives with her son and his family who take care of her. Per her son/ED report, the pt has had multiple episodes of hypoglycemia at home as low as BG in the 30s. At baseline she is able to walk and talk, but last night she developed weakness requiring assistance with transfers and became more withdrawn/confused.     Past Medical History:   Diagnosis Date    Acute upper respiratory infection, unspecified 03/04/2020    Acute URI    Acute upper respiratory infection, unspecified 09/30/2015    URTI (acute upper respiratory infection)    Arthritis     Body mass index (BMI) 23.0-23.9, adult 10/15/2021    BMI 23.0-23.9, adult    Body mass index (BMI) 33.0-33.9, adult 03/04/2020    BMI 33.0-33.9,adult    Cardiomegaly 08/27/2013    Left ventricular hypertrophy    Chronic kidney disease, stage 3 unspecified (CMS/HCC) 07/02/2013    Chronic kidney disease, stage III (moderate)     Disease of pericardium, unspecified 07/02/2013    Pericardial disease    Encounter for follow-up examination after completed treatment for conditions other than malignant neoplasm 10/06/2022    Hospital discharge follow-up    Generalized contraction of visual field, right eye 01/29/2015    Generalized contraction of visual field of right eye    Homonymous bilateral field defects, right side 04/29/2016    Homonymous bilateral field defects of right side    Hypertensive chronic kidney disease with stage 1 through stage 4 chronic kidney disease, or unspecified chronic kidney disease 07/02/2013    Nephrosclerosis    Laceration without foreign body, left foot, initial encounter 07/03/2018    Foot laceration, left, initial encounter    Migraine with aura, not intractable, without status migrainosus 10/24/2022    Ocular migraine    Other conditions influencing health status 07/02/2013    Chronic Glomerulonephritis In Diseases Classified Elsewhere    Other conditions influencing health status 07/02/2013    Progressive Familial Myoclonic Epilepsy    Other conditions influencing health status 07/02/2013    Protein S Deficiency    Other conditions influencing health status 05/22/2015    Familial Combined Hyperlipidemia    Other conditions influencing health status 10/24/2022    IDDM (insulin dependent diabetes mellitus)    Other conditions influencing health status 03/14/2022    Diabetes mellitus, insulin dependent (IDDM), uncontrolled    Other long term (current) drug therapy 10/24/2022    Long-term use of Plaquenil    Personal history of diseases of the blood and blood-forming organs and certain disorders involving the immune mechanism 07/02/2013    History of thrombocytopenia    Personal history of diseases of the skin and subcutaneous tissue 08/11/2015    History of foot ulcer    Personal history of nephrotic syndrome 07/02/2013    History of nephrotic syndrome    Personal history of other diseases of the circulatory  system 08/27/2013    History of sinus tachycardia    Personal history of other diseases of the nervous system and sense organs     History of cataract    Personal history of other diseases of the respiratory system     History of bronchitis    Personal history of other infectious and parasitic diseases 07/02/2013    History of hepatitis    Personal history of other specified conditions     History of shortness of breath    Personal history of other specified conditions 08/27/2013    History of edema    Puckering of macula, right eye 10/24/2022    ERM OD (epiretinal membrane, right eye)    Raynaud's syndrome without gangrene 07/02/2013    Raynaud's disease    Systemic lupus erythematosus, unspecified (CMS/HCC) 07/24/2015    SLE (systemic lupus erythematosus)    Systemic lupus erythematosus, unspecified (CMS/HCC) 07/24/2015    SLE (systemic lupus erythematosus)    Systemic lupus erythematosus, unspecified (CMS/HCC) 07/24/2015    Systemic lupus    Type 2 diabetes mellitus with diabetic nephropathy (CMS/HCC) 07/02/2013    Type 2 diabetes with nephropathy    Type 2 diabetes mellitus with mild nonproliferative diabetic retinopathy without macular edema, left eye (CMS/HCC) 07/27/2015    Non-proliferative diabetic retinopathy, left eye    Type 2 diabetes mellitus with mild nonproliferative diabetic retinopathy without macular edema, unspecified eye (CMS/HCC) 07/24/2015    Mild non proliferative diabetic retinopathy    Type 2 diabetes mellitus with proliferative diabetic retinopathy without macular edema, right eye (CMS/HCC) 07/27/2015    Proliferative diabetic retinopathy of right eye    Type 2 diabetes mellitus with proliferative diabetic retinopathy without macular edema, unspecified eye (CMS/HCC) 07/24/2015    Diabetic proliferative retinopathy    Unspecified acute and subacute iridocyclitis 07/24/2015    Acute iritis, right eye    Unspecified open wound, left foot, sequela 07/03/2018    Wound, open, foot, left, sequela      Past Surgical History:   Procedure Laterality Date    ANKLE SURGERY  01/29/2015    Ankle Surgery    CHOLECYSTECTOMY  01/29/2015    Cholecystectomy    CT GUIDED PERCUTANEOUS BIOPSY BONE DEEP  5/4/2021    CT GUIDED PERCUTANEOUS BIOPSY BONE DEEP 5/4/2021 [facility] CLINICAL LEGACY    EYE SURGERY  03/06/2015    Eye Surgery    FOOT SURGERY  01/29/2015    Foot Surgery    MR HEAD ANGIO WO IV CONTRAST  7/26/2013    MR HEAD ANGIO WO IV CONTRAST 7/26/2013 [facility] CLINICAL LEGACY    MR HEAD ANGIO WO IV CONTRAST  9/17/2021    MR HEAD ANGIO WO IV CONTRAST 9/17/2021 [facility]GAC[facility]    MR HEAD ANGIO WO IV CONTRAST  3/25/2023    MR HEAD ANGIO WO IV CONTRAST [facility]    MR NECK ANGIO WO IV CONTRAST  7/26/2013    MR NECK ANGIO WO IV CONTRAST 7/26/2013 [facility] CLINICAL LEGACY    MR NECK ANGIO WO IV CONTRAST  9/17/2021    MR NECK ANGIO WO IV CONTRAST 9/17/2021 [facility]    MR NECK ANGIO WO IV CONTRAST  3/25/2023    MR NECK ANGIO WO IV CONTRAST [facility]    OTHER SURGICAL HISTORY  01/29/2015    Creation Of Pericardial Window    OTHER SURGICAL HISTORY  01/29/2015    Quadricepsplasty    TOTAL HIP ARTHROPLASTY  01/29/2015    Hip Replacement     Medications Prior to Admission   Medication Sig Dispense Refill Last Dose    acetaminophen (Tylenol) 325 mg tablet Take 2 tablets (650 mg) by mouth every 4 hours if needed for mild pain (1 - 3).   Past Week    amLODIPine (Norvasc) 2.5 mg tablet Take 1 tablet (2.5 mg) by mouth once daily.   11/19/2023    apixaban (Eliquis) 5 mg tablet Take 0.5 tablets (2.5 mg) by mouth 2 times a day. 30 tablet 0 11/19/2023    atorvastatin (Lipitor) 40 mg tablet Take 1 tablet (40 mg) by mouth once daily. 90 tablet 3 11/20/2023    belimumab (Benlysta) 120 mg recon soln IV injection Infuse 10mg/kg (900mg) IV For Maintenance: every 4 weeks 8 each 3     belimumab (Benlysta) 400 mg recon soln IV injection Infuse 10 mg/kg into a venous catheter every 28 (twenty-eight) days.  FOR MAINTENANCE THERAPY       blood-glucose  sensor (Dexcom G6 Sensor) device Use to check sugars 3 times daily 4 each 2     blood-glucose sensor device USE AS DIRECTED TO TEST BLOOD GLUCOSE. CHANGE EVERY 14 DAYS 2 each 1     Ca carb-D3-mag vw-fqq-sbhj-Zn 300 mg-20 mcg- 25 mg-0.5 mg tablet Take 1 tablet by mouth 2 times a day.   11/19/2023    Dexcom G4 platinum  (Dexcom G6 ) misc Use as instructed 1 each 0     Dexcom G4 platinum transmitter (Dexcom G6 Transmitter) device Use as instructed 1 each 0     diphenoxylate-atropine (Lomotil) 2.5-0.025 mg tablet Take 1 tab bid prn diarrhea (Patient not taking: Reported on 11/20/2023) 14 tablet 0 Not Taking    fludrocortisone (Florinef) 0.1 mg tablet TAKE 1 TABLET BY MOUTH EVERY OTHER DAY 45 tablet 0 11/19/2023    fluticasone-umeclidin-vilanter (TRELEGY-ELLIPTA) 200-62.5-25 mcg blister with device Inhale 1 puff once daily. 60 each 5 11/19/2023    folic acid (Folvite) 1 mg tablet TAKE 1 TABLET BY MOUTH EVERY DAY 90 tablet 1 11/19/2023    lancets (OneTouch Delica Plus Lancet) 30 gauge misc        loperamide (Imodium) 2 mg capsule TAKE 1 CAPSULE BY MOUTH THREE TIMES A DAY WITH A MEAL FOR LOOSE STOOLS 90 capsule 0 11/19/2023    magnesium oxide (Mag-Ox) 400 mg tablet Take 1 tablet (400 mg) by mouth once daily.   11/19/2023    melatonin 5 mg tablet Take 1 tablet (5 mg) by mouth once daily at bedtime.   11/19/2023    multivitamin tablet Take 1 tablet by mouth once daily.   11/19/2023    mycophenolate (Cellcept) 500 mg tablet Take 2 tablets (1,000 mg) by mouth twice a day.   11/19/2023    OneTouch Verio test strips strip 1 strip in the morning, at noon, in the evening, and at bedtime. 120 strip 11     pantoprazole (ProtoNix) 40 mg EC tablet TAKE 1 TABLET BY MOUTH EVERY DAY 90 tablet 1 11/19/2023    predniSONE (Deltasone) 5 mg tablet TAKE 1 TABLET BY MOUTH THREE TIMES A DAY 90 tablet 0 11/19/2023    risperiDONE (RisperDAL) 0.5 mg tablet TAKE 1 TABLET BY MOUTH AT BEDTIME 30 tablet 0 11/19/2023    torsemide  (Demadex) 10 mg tablet TAKE 1 TABLET BY MOUTH ONCE DAILY 30 tablet 2 11/19/2023     Ace inhibitors, Hydroxychloroquine, Lisinopril, Penicillins, and Sulfa (sulfonamide antibiotics)  Social History     Tobacco Use    Smoking status: Never    Smokeless tobacco: Never   Vaping Use    Vaping Use: Never used   Substance Use Topics    Alcohol use: Never    Drug use: Never     Family History   Problem Relation Name Age of Onset    Other (RENAL DISEASE) Mother          END STAGE    Other (CARDIAC DISORDER) Mother      Cataracts Mother      Stroke Mother      Diabetes Mother      Kidney disease Mother      Lupus Mother      Other (CARDIAC DISORDER) Father      COPD Father      Glaucoma Father      Hypertension Father      Sleep apnea Father      Other (CARDIAC DISORDER) Sister      Depression Sister      Kidney disease Sister      Sickle cell trait Sister      Sleep apnea Daughter         ED Course:  Vitals: 36.1, HR 74, RR 18, /68, SpO2 95%  Labs:   -> POCT glucose 58 -> 70  ->CMP: glucose 70, Na 145, K 4.5, Cl 117, Bicarb 20, Cr 1.68 (BL ~1.4-1.6), LFTs wnl  ->CBC: pancytopenic at baseline WBC 3.0, hgb 7.9, plt 89  -> UA w/ leuk esterase, WBC, culture pending  - > COVID/flu/RSV negative  Imaging:   CT abdomen pelvis wo IV contrast   Final Result   No evidence of bowel obstruction or acute appendicitis. Colon is   diffusely underdistended and not well evaluated. There is   underdistention versus wall thickening of the ascending colon which   may be infectious or inflammatory in etiology. Colonic diverticulosis   without evidence of acute diverticulitis.        Limited evaluation of the urinary bladder secondary to   underdistention and beam hardening artifact. Underdistention versus   mild urinary bladder wall thickening; please correlate with   urinalysis to exclude cystitis.        MACRO:   None        Signed by: Deacon Munoz 11/20/2023 11:26 PM   Dictation workstation:   [id number]      XR chest 1 view   Final  Result   Stable mild enlargement of the cardiac silhouette. Mildly hypoinflated   lungs which appear clear. Calcified and tortuous aorta.    Signed by Alan Linton MD        EKG: NSR w/ LVH seen on previous EKG  Interventions: s/p 1g ceftriaxone in ED    Review of Systems:  Review of Systems   Constitutional:  Negative for appetite change, chills and fever.   Respiratory:  Negative for cough and shortness of breath.    Cardiovascular:  Negative for chest pain.   Gastrointestinal:  Positive for abdominal pain, diarrhea, nausea and vomiting. Negative for constipation.   Genitourinary:  Positive for urgency. Negative for dysuria.          Objective   Vitals:  Most Recent:  Vitals:    11/20/23 2000   BP: 95/64   Pulse: 60   Resp: 16   Temp: 35.6 °C (96.1 °F)   SpO2: 100%       24hr Min/Max:  Temp  Min: 35.6 °C (96.1 °F)  Max: 36.1 °C (97 °F)  Pulse  Min: 60  Max: 74  BP  Min: 90/50  Max: 101/56  Resp  Min: 16  Max: 18  SpO2  Min: 95 %  Max: 100 %    Hemodynamic parameters for last 24 hours:       Lab/Radiology/Diagnostic Review:  Results for orders placed or performed during the hospital encounter of 11/20/23 (from the past 24 hour(s))   POCT GLUCOSE   Result Value Ref Range    POCT Glucose 58 (L) 74 - 99 mg/dL   POCT GLUCOSE   Result Value Ref Range    POCT Glucose 70 (L) 74 - 99 mg/dL   CBC with Differential   Result Value Ref Range    WBC 3.0 (L) 4.4 - 11.3 x10*3/uL    nRBC 1.0 (H) 0.0 - 0.0 /100 WBCs    RBC 2.66 (L) 4.00 - 5.20 x10*6/uL    Hemoglobin 7.9 (L) 12.0 - 16.0 g/dL    Hematocrit 26.6 (L) 36.0 - 46.0 %     80 - 100 fL    MCH 29.7 26.0 - 34.0 pg    MCHC 29.7 (L) 32.0 - 36.0 g/dL    RDW 20.9 (H) 11.5 - 14.5 %    Platelets 89 (L) 150 - 450 x10*3/uL    Immature Granulocytes %, Automated 0.7 0.0 - 0.9 %    Immature Granulocytes Absolute, Automated 0.02 0.00 - 0.70 x10*3/uL   Comprehensive Metabolic Panel   Result Value Ref Range    Glucose 71 (L) 74 - 99 mg/dL    Sodium 145 136 - 145 mmol/L    Potassium  4.5 3.5 - 5.3 mmol/L    Chloride 117 (H) 98 - 107 mmol/L    Bicarbonate 20 (L) 21 - 32 mmol/L    Anion Gap 13 10 - 20 mmol/L    Urea Nitrogen 53 (H) 6 - 23 mg/dL    Creatinine 1.68 (H) 0.50 - 1.05 mg/dL    eGFR 34 (L) >60 mL/min/1.73m*2    Calcium 8.2 (L) 8.6 - 10.3 mg/dL    Albumin 3.0 (L) 3.4 - 5.0 g/dL    Alkaline Phosphatase 79 33 - 136 U/L    Total Protein 5.1 (L) 6.4 - 8.2 g/dL    AST 28 9 - 39 U/L    Bilirubin, Total 0.2 0.0 - 1.2 mg/dL    ALT 24 7 - 45 U/L   Manual Differential   Result Value Ref Range    Neutrophils %, Manual 44.0 40.0 - 80.0 %    Bands %, Manual 2.0 0.0 - 5.0 %    Lymphocytes %, Manual 49.0 13.0 - 44.0 %    Monocytes %, Manual 5.0 2.0 - 10.0 %    Eosinophils %, Manual 0.0 0.0 - 6.0 %    Basophils %, Manual 0.0 0.0 - 2.0 %    Seg Neutrophils Absolute, Manual 1.32 1.20 - 7.00 x10*3/uL    Bands Absolute, Manual 0.06 0.00 - 0.70 x10*3/uL    Lymphocytes Absolute, Manual 1.47 1.20 - 4.80 x10*3/uL    Monocytes Absolute, Manual 0.15 0.10 - 1.00 x10*3/uL    Eosinophils Absolute, Manual 0.00 0.00 - 0.70 x10*3/uL    Basophils Absolute, Manual 0.00 0.00 - 0.10 x10*3/uL    Total Cells Counted 100     Neutrophils Absolute, Manual 1.38 1.20 - 7.70 x10*3/uL    RBC Morphology See Below     RBC Fragments Few     Ovalocytes Few     Katarzyna Cells Few    C-Reactive Protein   Result Value Ref Range    C-Reactive Protein 0.88 <1.00 mg/dL   Sedimentation Rate   Result Value Ref Range    Sedimentation Rate 26 0 - 30 mm/h   POCT GLUCOSE   Result Value Ref Range    POCT Glucose 88 74 - 99 mg/dL   Sars-CoV-2 and Influenza A/B PCR   Result Value Ref Range    Flu A Result Not Detected Not Detected    Flu B Result Not Detected Not Detected    Coronavirus 2019, PCR Not Detected Not Detected   RSV PCR   Result Value Ref Range    RSV PCR Not Detected Not Detected   Urinalysis with Reflex Microscopic and Culture   Result Value Ref Range    Color, Urine Yellow Straw, Yellow    Appearance, Urine Hazy (N) Clear    Specific  Gravity, Urine 1.012 1.005 - 1.035    pH, Urine 5.0 5.0, 5.5, 6.0, 6.5, 7.0, 7.5, 8.0    Protein, Urine 100 (2+) (N) NEGATIVE mg/dL    Glucose, Urine NEGATIVE NEGATIVE mg/dL    Blood, Urine NEGATIVE NEGATIVE    Ketones, Urine NEGATIVE NEGATIVE mg/dL    Bilirubin, Urine NEGATIVE NEGATIVE    Urobilinogen, Urine <2.0 <2.0 mg/dL    Nitrite, Urine NEGATIVE NEGATIVE    Leukocyte Esterase, Urine LARGE (3+) (A) NEGATIVE   Extra Urine Gray Tube   Result Value Ref Range    Extra Tube Hold for add-ons.    Microscopic Only, Urine   Result Value Ref Range    WBC, Urine >50 (A) 1-5, NONE /HPF    RBC, Urine 1-2 NONE, 1-2, 3-5 /HPF   Light Blue Top   Result Value Ref Range    Extra Tube Hold for add-ons.    POCT GLUCOSE   Result Value Ref Range    POCT Glucose 51 (L) 74 - 99 mg/dL   POCT GLUCOSE   Result Value Ref Range    POCT Glucose 58 (L) 74 - 99 mg/dL   POCT GLUCOSE   Result Value Ref Range    POCT Glucose 131 (H) 74 - 99 mg/dL   POCT GLUCOSE   Result Value Ref Range    POCT Glucose 65 (L) 74 - 99 mg/dL   POCT GLUCOSE   Result Value Ref Range    POCT Glucose 63 (L) 74 - 99 mg/dL     XR chest 1 view    Result Date: 11/20/2023  STUDY: Chest Radiograph;  11/20/2023 3:08 PM. INDICATION: Weakness. COMPARISON: XR chest 10/19/2023. ACCESSION NUMBER(S): [id number] ORDERING CLINICIAN: BUSHRA NGUYEN TECHNIQUE:  Frontal chest was obtained at 15:07 hours. FINDINGS: CARDIOMEDIASTINAL SILHOUETTE: Cardiomediastinal silhouette is mildly enlarged in size. The aorta is calcified and tortuous.  LUNGS: Lungs are mildly hypoinflated, but appear clear.  ABDOMEN: No remarkable upper abdominal findings.  BONES: No acute osseous changes.    Stable mild enlargement of the cardiac silhouette. Mildly hypoinflated lungs which appear clear. Calcified and tortuous aorta. Signed by Alan Linton MD       Intake/Output:    Intake/Output Summary (Last 24 hours) at 11/21/2023 0031  Last data filed at 11/20/2023 2000  Gross per 24 hour   Intake 290 ml    Output --   Net 290 ml       Physical exam:    Physical Exam  Constitutional:       General: She is not in acute distress.  Cardiovascular:      Rate and Rhythm: Normal rate and regular rhythm.   Pulmonary:      Effort: Pulmonary effort is normal.      Breath sounds: No wheezing, rhonchi or rales.   Abdominal:      General: There is no distension.      Palpations: Abdomen is soft.      Tenderness: There is abdominal tenderness (generalized). There is no guarding.   Musculoskeletal:      Right lower leg: No edema.      Left lower leg: No edema.   Skin:     General: Skin is warm and dry.   Neurological:      Mental Status: She is alert.      Comments: Oriented to self and place, not year.   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 Admitted to the hospital for AMS, hypoglycemia, possible UTI, possible Cdiff.     # AMS  # Generalized weakness  - ddx: hypoglycemia, UTI, adrenal insufficiency, lupus cerebritis  - holding home melatonin, Risperdal   - PT/OT    # Possible UTI  - UA w/ leuk esterase, WBCs  - s/p 1g ceftriaxone in ED  - hx contaminated UA in the past  - pt without urinary symptoms, afebrile  - can consider continuing antibiotics    # T2DM  # Hypoglycemia  - q4 glucose checks  - hypoglycemia protocol  - regular diet    # SLE c/b lupus cerebritis and Jaccoud's arthropathy  # chronic steroid use  - continue home prednisone, fludrocortisone, Cellcept  - check ACTH, AM cortisol, anti dsDNA antibody if deteriorates clinically   - can consider stress dose steroids if  deteriorates clinically     # c/f c diff vs gastroenteritis  - pt does have history of c diff infection in Feb 2023  - now having profuse diarrhea and generalized abdominal tenderness  - c diff PCR, stool pathogen panel, CT abdomen pending     # paroxysmal afib  - on Eliquis, continue    # HTN  - blood pressures ranging from 90-100s systolic  - hold home meds    # GERD  - home protonix    Consultants: none  Diet: regular  Code Status: FULL CODE  DVT Prophylaxis: Eliquis, SCDs  Disposition: likely SNF    This assessment and plan has been discussed with the senior resident and attending physician.    Hina Hoyos,   Family Medicine, PGY-1      Senior Resident Addendum:  Patient seen and examined independently of intern resident. The above documentation was reviewed by me and reflects my direct input. My changes are italicized.    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 Also endorses dysuria (though does have chronic incontinence), dull nonspecific abdominal pain over last 3 days, decreased oral intake and nonbloody nonmucous diarrhea for the last few weeks. Per ED Resident signout, large component of this admission is need for placement due to son's requests. Recently admitted from 10/20-23 for cystitis, lumbar stenosis with neurogenic claudication as well as T7 compression fracture and discharged to [facility]. Has had frequent admissions prior to last month including in end of August for sepsis 2/2 CAP. Recently saw endocrinologist, Dr. Chi, received dexcom, has had a few lows in the last week as low as 30s, in which family wakes her to feed her. Has had recent cdiff PCR  x2 that were negative, started on Imodium and changed to Lomotil. Vitals are stable, BP on softer side but she usually runs low. Labs remarkable for hypoglycemia of  71, pancytopenia, stable elevated SCr, and UA positive for leukos and WBCs. Treated with dose of Rocephin in ED and admitted for AMS due to UTI and likely need for placement. On exam, she is AOx3 without focal deficits, abdominal pain located in epigastric and periumbilical region and mild suprapubic tenderness. Bedside RN stated blood sugar on arriving to floor was in 50s, given 60mL of juice. Given abdominal pain on exam, and length of diarrhea in immunocompromised host, will order CT wo contrast as well as stool studies and cdiff PCR. Will defer to day team regarding need to treat for UTI as do feel symptoms are due to chronic incontinence and is high risk for antibiotic use given history of C. difficile.  Will monitor vitals and clinical status closely and low threshold to administer stress dose steroids if begins to deteriorate.     Due to persistent hypoglycemia despite oral intake and d50 as well as nonspecific abdominal pain, diarrhea and weakness and nonspecific findings on CT, will administer Solu-Cortef 100 mgIV, next stress dose steroids have been ordered as IV Solu-Cortef 75 mg every 6 hours. Due to deteriorated clinical status and concern for infection, second day of Rocephin has been ordered (dosed q12 hours).    -Nicole Alarcon D.O. PGY-2    -------------------------  Attending Addendum  -------------------------    Seen and examined with resident physicians on 11/21.  Labs and imaging personally reviewed.  This is a 62-year-old female known to this physician with a history of lupus cerebritis, G-codes arthropathy, SLE, type 2 diabetes with labile blood glucose, secondary adrenal insufficiency from prolonged steroid administration, CKD 3, paroxysmal atrial fibrillation on Eliquis, primary hypertension, MDS who presents with weakness,  hypoglycemia and altered mentation from home found to have a blood sugar of 30 on home CGM, on arrival blood sugar 58 without much improvement with administration of D50.    Apparently has had some loose stools at home and has a history of C. difficile colitis however multiple tests as an outpatient were negative for C. difficile, CT abdomen obtained in the ED with possible ascending colitis but no other findings, chest x-ray clean, urinalysis with 3+ leukocyte Estrace and white blood cell however no nitrites and white blood cell count was 3.0 near her baseline.  No clear evidence of infection on history or physical however did receive 1 dose of Rocephin in the ED for possible urinary tract infection as the patient cannot provide a clear history of her recent symptoms.    Admitted for management of hypoglycemia and altered mentation.  Overnight patient had soft pressures with systolic blood pressures in the 90s, persistent hypoglycemia, likely secondary to adrenal insufficiency and was started on stress dose steroids with improvement of her blood pressure and blood sugar however still with slowed cognitive function this morning.  On prior admissions whereupon she had hypoglycemia and glucopenia she has been noted to have altered mentation and slowed cognitive function that has slowly returned over 1 to 2 days with improvement in her blood sugars.    On my exam at the morning the patient is resting comfortably in bed, she is slow to respond but responds appropriately, she is well-perfused, no acute distress, oxygenating well on room air, moving all limbs, no facial asymmetry.    Secondary adrenal insufficiency  Hypoglycemia  SLE on CellCept/prednisone 5 mg 3 times daily  No specific cause can be found for her decompensation and acute adrenal insufficiency, she does have 3+ leukocyte Estrace and white blood cells in her urine however no clear symptomology.  CT abdomen with ascending colitis and recent loose stools  however outpatient C. difficile testing has been negative and she has not been observed to have loose stools overnight.  Additionally, ESR and CRP are low and she does not have a white count.  No clear source of infection or inciting etiology can be found, suspect her presentation is secondary to medication noncompliance at home.  She has had multiple admissions over the last year, seems to do well on discharge to a skilled nursing facility where steroids can be administered on schedule and subsequently decompensates after returning home with her family.  She denies missing any dosages of prednisone however I suspect that when she has episodes of hypoglycemia she is not necessarily aware of her compliance 1 way or another.  Overnight she received stress dose hydrocortisone with improvement in her blood sugar.  As her presentation is likely due to medication noncompliance will place on maintenance hydrocortisone.  She was originally on 5 mg prednisone 3 times daily on arrival, as well as fludrocortisone, to ease dosing I believe she should transition to physiologic steroids with twice daily dosing of hydrocortisone without fludrocortisone to facilitate compliance at home, will begin hydrocortisone 30 mg twice daily today and follow her blood sugars and mentation closely.  In addition, I have requested endocrinology evaluate patient to determine if there is another reason for her recurrent marked hypoglycemia.  In the past she has had random insulin levels, C-peptide and beta hydroxybutyrate levels drawn but no formal appropriate workup for insulin access, I have low suspicion for this etiology however she seems to have a quite drastic response to hypocortisolism with blood sugars in the 30s which may indicate some sort of insulin excess or perhaps insulin antibodies.  Will continue every 4 hour blood sugar checks, await endocrinology input and have physical therapy see and evaluate patient  today.    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  No blasts on differential.  No evidence of bleeding    Asymptomatic bacteriuria  History of recurrent C. difficile associated diarrhea  Received antibiotics overnight for possible UTI however low suspicion and considering patient has a history of C. difficile associated diarrhea and possible ascending colitis on CT abdomen will discontinue this medication and follow patient closely for any signs of urinary tract infection and/or C. difficile infection.  If patient has loose stools will have C. difficile PCR sent for analysis.  If positive will need pulsed dosed tapering vancomycin or perhaps fidaxomicin.     Regarding chronic medical conditions please see resident documentation    Case discussed with case management, residents and patient.    Complexity: High    Total visit time = > 75 minutes; more than 50% spent counseling/coordinating care    I saw and evaluated the patient. I personally obtained the key and critical portions of the history and physical exam or was physically present for key and critical portions performed by the resident/fellow. I reviewed the resident/fellow's documentation and discussed the patient with the resident/fellow. I agree with the resident/fellow's medical decision making as documented in the note.    Luis Wren, DO

## 2023-11-21 NOTE — PROGRESS NOTES
Occupational Therapy    Evaluation/Treatment    Patient Name: Bing Holliday  MRN: [id number]  : 1960  Today's Date: 23  Time Calculation  Start Time: 1041  Stop Time: 1057  Time Calculation (min): 16 min       Assessment:  Prognosis: Good  Evaluation/Treatment Tolerance: Patient limited by fatigue  Medical Staff Made Aware: Yes  End of Session Communication: Bedside nurse (Notified RN of transfer technique and 2 assist required)  End of Session Patient Position: Up in chair, Alarm on (LEs up, all needs in reach)  OT Assessment Results: Decreased ADL status, Decreased upper extremity range of motion, Decreased upper extremity strength, Decreased safe judgment during ADL, Decreased cognition, Decreased endurance, Decreased fine motor control, Decreased functional mobility, Decreased gross motor control, Decreased IADLs, Decreased trunk control for functional activities  Prognosis: Good  Evaluation/Treatment Tolerance: Patient limited by fatigue  Medical Staff Made Aware: Yes    Plan:  Treatment Interventions: ADL retraining, Functional transfer training, Endurance training, Patient/family training, Equipment evaluation/education, Compensatory technique education  OT Frequency: 3 times per week  OT Discharge Recommendations: Moderate intensity level of continued care  OT Recommended Transfer Status: Assist of 2  OT - OK to Discharge:  (Pt. requires 24 hour assistance and continued OT)  Treatment Interventions: ADL retraining, Functional transfer training, Endurance training, Patient/family training, Equipment evaluation/education, Compensatory technique education  Subjective     Current Problem:  1. UTI (urinary tract infection)          Past Medical History:   Diagnosis Date    Acute upper respiratory infection, unspecified 2020    Acute URI    Acute upper respiratory infection, unspecified 2015    URTI (acute upper respiratory infection)    Arthritis     Body mass index (BMI) 23.0-23.9,  adult 10/15/2021    BMI 23.0-23.9, adult    Body mass index (BMI) 33.0-33.9, adult 03/04/2020    BMI 33.0-33.9,adult    Cardiomegaly 08/27/2013    Left ventricular hypertrophy    Chronic kidney disease, stage 3 unspecified (CMS/HCC) 07/02/2013    Chronic kidney disease, stage III (moderate)    Disease of pericardium, unspecified 07/02/2013    Pericardial disease    Encounter for follow-up examination after completed treatment for conditions other than malignant neoplasm 10/06/2022    Hospital discharge follow-up    Generalized contraction of visual field, right eye 01/29/2015    Generalized contraction of visual field of right eye    Homonymous bilateral field defects, right side 04/29/2016    Homonymous bilateral field defects of right side    Hypertensive chronic kidney disease with stage 1 through stage 4 chronic kidney disease, or unspecified chronic kidney disease 07/02/2013    Nephrosclerosis    Laceration without foreign body, left foot, initial encounter 07/03/2018    Foot laceration, left, initial encounter    Migraine with aura, not intractable, without status migrainosus 10/24/2022    Ocular migraine    Other conditions influencing health status 07/02/2013    Chronic Glomerulonephritis In Diseases Classified Elsewhere    Other conditions influencing health status 07/02/2013    Progressive Familial Myoclonic Epilepsy    Other conditions influencing health status 07/02/2013    Protein S Deficiency    Other conditions influencing health status 05/22/2015    Familial Combined Hyperlipidemia    Other conditions influencing health status 10/24/2022    IDDM (insulin dependent diabetes mellitus)    Other conditions influencing health status 03/14/2022    Diabetes mellitus, insulin dependent (IDDM), uncontrolled    Other long term (current) drug therapy 10/24/2022    Long-term use of Plaquenil    Personal history of diseases of the blood and blood-forming organs and certain disorders involving the immune mechanism  07/02/2013    History of thrombocytopenia    Personal history of diseases of the skin and subcutaneous tissue 08/11/2015    History of foot ulcer    Personal history of nephrotic syndrome 07/02/2013    History of nephrotic syndrome    Personal history of other diseases of the circulatory system 08/27/2013    History of sinus tachycardia    Personal history of other diseases of the nervous system and sense organs     History of cataract    Personal history of other diseases of the respiratory system     History of bronchitis    Personal history of other infectious and parasitic diseases 07/02/2013    History of hepatitis    Personal history of other specified conditions     History of shortness of breath    Personal history of other specified conditions 08/27/2013    History of edema    Puckering of macula, right eye 10/24/2022    ERM OD (epiretinal membrane, right eye)    Raynaud's syndrome without gangrene 07/02/2013    Raynaud's disease    Systemic lupus erythematosus, unspecified (CMS/HCC) 07/24/2015    SLE (systemic lupus erythematosus)    Systemic lupus erythematosus, unspecified (CMS/HCC) 07/24/2015    SLE (systemic lupus erythematosus)    Systemic lupus erythematosus, unspecified (CMS/HCC) 07/24/2015    Systemic lupus    Type 2 diabetes mellitus with diabetic nephropathy (CMS/HCC) 07/02/2013    Type 2 diabetes with nephropathy    Type 2 diabetes mellitus with mild nonproliferative diabetic retinopathy without macular edema, left eye (CMS/HCC) 07/27/2015    Non-proliferative diabetic retinopathy, left eye    Type 2 diabetes mellitus with mild nonproliferative diabetic retinopathy without macular edema, unspecified eye (CMS/HCC) 07/24/2015    Mild non proliferative diabetic retinopathy    Type 2 diabetes mellitus with proliferative diabetic retinopathy without macular edema, right eye (CMS/HCC) 07/27/2015    Proliferative diabetic retinopathy of right eye    Type 2 diabetes mellitus with proliferative diabetic  retinopathy without macular edema, unspecified eye (CMS/[facility]) 07/24/2015    Diabetic proliferative retinopathy    Unspecified acute and subacute iridocyclitis 07/24/2015    Acute iritis, right eye    Unspecified open wound, left foot, sequela 07/03/2018    Wound, open, foot, left, sequela     Past Surgical History:   Procedure Laterality Date    ANKLE SURGERY  01/29/2015    Ankle Surgery    CHOLECYSTECTOMY  01/29/2015    Cholecystectomy    CT GUIDED PERCUTANEOUS BIOPSY BONE DEEP  5/4/2021    CT GUIDED PERCUTANEOUS BIOPSY BONE DEEP 5/4/2021 [facility] CLINICAL LEGAC[facility]    EYE SURGERY  03/06/2015    Eye Surgery    FOOT SURGERY  01/29/2015    Foot Surgery    MR HEAD ANGIO WO IV CONTRAST  7/26/2013    MR HEAD ANGIO WO IV CONTRAST 7/26/2013 [facility] CLINICAL LEGACY    MR HEAD ANGIO WO IV CONTRAST  9/17/2021    MR HEAD ANGIO WO IV CONTRAST 9/17/2021 [facility]    MR HEAD ANGIO WO IV CONTRAST  3/25/2023    MR HEAD ANGIO WO IV CONTRAST [facility]    MR NECK ANGIO WO IV CONTRAST  7/26/2013    MR NECK ANGIO WO IV CONTRAST 7/26/2013 [facility] CLINICAL LEGACY    MR NECK ANGIO WO IV CONTRAST  9/17/2021    MR NECK ANGIO WO IV CONTRAST 9/17/2021 [facility]    MR NECK ANGIO WO IV CONTRAST  3/25/2023    MR NECK ANGIO WO IV CONTRAST [facility]    OTHER SURGICAL HISTORY  01/29/2015    Creation Of Pericardial Window    OTHER SURGICAL HISTORY  01/29/2015    Quadricepsplasty    TOTAL HIP ARTHROPLASTY  01/29/2015    Hip Replacement       General:      General  Referred By: Holger  Past Medical History Relevant to Rehab: Includes:  T2DM, CKD 3, 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  Multiple episodes of hypoglycemia at home. The night before admission, her blood suger dropped into the 30s, despite appropriate oral intake.    Precautions:  Medical Precautions: Fall precautions (bilat. LEs buckle, gait belt)  Precautions Comment: contact plus precautions    Vital Signs:       Pain:  Pain Assessment  Pain Assessment: 0-10  Pain Score: 0 - No pain  Objective     Cognition:  Overall Cognitive Status: Impaired  Orientation Level: Disoriented to time             Home Living:  Home Living Comments: Pt lives in house with adult children. 3 Steps to enter.  Pt sleeps on first floor and walks down 12 steps to basement to bathe with WIS and bench.  Pt requires assistance from her children with getting in/out of shower.  Pt. states that she manages all other ADL on her own. Completes bed mobiltiy on own. Mod I for household ambulation using rollator.  Pt. states she has WC but rarely uses it.        Activities of Daily Living:   Eating Assistance: Minimal  Grooming Assistance: Moderate  Bathing Assistance: Maximal  UE Dressing Assistance: Moderate  LE Dressing Assistance: Total  Toileting Assistance with Device: Total                         Activity Tolerance:  Endurance: Tolerates less than 10 min exercise, no significant change in vital signs        Bed Mobility/Transfers: Bed Mobility  Bed Mobility: Yes  Bed Mobility 1  Bed Mobility 1: Supine to sitting  Level of Assistance 1: Contact guard (increased time and effort)  Transfers  Transfer: Yes  Transfer 1  Transfer From 1: Bed to  Transfer to 1: Stand  Transfer Level of Assistance 1: Maximum assistance, +2  Trials/Comments 1: Attempted with FWW, unable to safely get to standing.  Removed FWW, stood from EOB with 2 maxassist.  Transfers 2  Transfer From 2: Bed to  Transfer to 2: Chair with arms  Technique 2: Stand pivot  Transfer Level of Assistance 2: Maximum assistance, +2  Trials/Comments 2: Assist to  boost, block knees, reach to armrest, turn, descend        Balance:   Static Sitting Balance  Static Sitting-Comment/Number of Minutes: Good  Dynamic Sitting Balance  Dynamic Sitting-Comments: Fair  Static Standing Balance  Static Standing-Comment/Number of Minutes: Poor  Dynamic Standing Balance  Dynamic Standing-Comments: Poor        Extremities: RUE   RUE :  (Shoulder and elbow AROM WFL.  Wrist grossly WFL, but joint changes noted. Shoulder and elbow 4-/5.) and LUE   LUE:  (Shoulder and elbow AROM WFL. Wrist grossly WFL, but joint changes noted. Shoulder and elbow 4-/5.)    Outcome Measures: [facility] Daily Activity  Putting on and taking off regular lower body clothing: Total  Bathing (including washing, rinsing, drying): A lot  Putting on and taking off regular upper body clothing: A lot  Toileting, which includes using toilet, bedpan or urinal: Total  Taking care of personal grooming such as brushing teeth: A lot  Eating Meals: A little  Daily Activity - Total Score: 11    Education Documentation  ADL Training, taught by Bing Benitez, OT at 11/21/2023  4:35 PM.  Learner: Patient  Readiness: Acceptance  Method: Explanation  Response: Verbalizes Understanding, Needs Reinforcement    Education Comments  No comments found.        EDUCATION:       Goals:  Encounter Problems       Encounter Problems (Active)       OT Goals       Sup for bed mobility.        Start:  11/21/23    Expected End:  12/05/23            Mod assist sit/stand to facilitate ADL.        Start:  11/21/23    Expected End:  12/05/23            Mod assist bed/chair/commode pivot transfer.        Start:  11/21/23    Expected End:  12/05/23            Fair (-) static standing balance with UE support to facilitate ADL.        Start:  11/21/23    Expected End:  12/05/23            Min assist grooming.        Start:  11/21/23    Expected End:  12/05/23

## 2023-11-21 NOTE — PROGRESS NOTES
Physical Therapy    Physical Therapy Evaluation    Patient Name: Bing Holliday  MRN: [id number]  Today's Date: 11/21/2023   Time Calculation  Start Time: 1041  Stop Time: 1056  Time Calculation (min): 15 min  3005    Assessment/Plan   PT Assessment: Pt demonstrates decreased strength, decreased activity tolerance, and impaired balance/mobility.  At this time, pt is unsafe to return home secondary to inability to safely transfer or ambulate household distances without significant assistance.  Based on current level of function, pt would benefit from continued skilled therapy while in the hospital to ensure safety, decrease risk of falls, and regains strength/mobility back to baseline.  Once stable enough for discharge, pt would benefit from moderate intensity therapy.   PT Assessment Results: Decreased strength, Decreased range of motion, Decreased endurance, Impaired balance, Decreased mobility, Decreased cognition, Decreased safety awareness  Rehab Prognosis: Good  Evaluation/Treatment Tolerance: Patient limited by fatigue  Medical Staff Made Aware: Yes  End of Session Communication: Bedside nurse  End of Session Patient Position: Up in chair, Alarm on  IP OR SWING BED PT PLAN  Inpatient or Swing Bed: Inpatient  PT Plan  Treatment/Interventions: Bed mobility, Transfer training, Gait training, Stair training, Balance training, Neuromuscular re-education, Strengthening, Range of motion, Therapeutic exercise, Therapeutic activity  PT Plan: Skilled PT  PT Frequency: 3 times per week  PT Discharge Recommendations: Moderate intensity level of continued care  Equipment Recommended upon Discharge: Wheeled walker, Wheelchair  PT Recommended Transfer Status: Assist x2  PT - OK to Discharge: Yes - To next level of care when cleared by medical team    Subjective     Current Problem:  Patient Active Problem List   Diagnosis    COVID-19    Lupus (CMS/HCC)    Adrenal crisis (CMS/[facility])    Ambulatory dysfunction    Myelodysplastic  syndrome, unspecified (CMS/HCC)    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HCC)    Hyperlipidemia    Pneumonia of both lower lobes due to infectious organism    Hypoglycemia    Hallucinations    Leg swelling    Hyperglycemia    JED (acute kidney injury) (CMS/HCC)    Hypernatremia    Proliferative diabetic retinopathy of right eye without macular edema determined by examination associated with type 2 diabetes mellitus (CMS/[facility])    Urinary incontinence    Weakness of both lower extremities    Abdominal cramping    Abnormal echocardiogram    Abnormal gait    Abnormal thyroid blood test    Advanced COPD (CMS/[facility])    Afferent pupillary defect of right eye    Anemia    Pancytopenia (CMS/HCC)    Altitudinal scotoma of right eye    Arcuate scotoma of left eye    Arthropathy    Asymptomatic PVCs    Atrial fibrillation (CMS/[facility])    Balance problem    Bilateral high frequency sensorineural hearing loss    Bradycardia    Branch retinal artery occlusion    Cardiomyopathy (CMS/HCC)    Chronic diarrhea    Chronic fatigue    Chronic kidney disease (CKD), stage III (moderate) (CMS/[facility])    CKD stage G3a/A2, GFR 45-59 and albumin creatinine ratio  mg/g (CMS/[facility])    Combined forms of age-related cataract of left eye    Combined forms of age-related cataract of right eye    Contracture of hand    Snoring    CVA (cerebral vascular accident) (CMS/[facility])    Daytime somnolence    Degeneration of lumbar/lumbosacral disc without myelopathy    Depression, major    Dizziness    Dupuytren's contracture    Eczema    Elevated alkaline phosphatase level    Encephalopathy    Facial twitching    Fibromyalgia    Fungal nail infection    GERD (gastroesophageal reflux disease)    Gouty arthropathy    H/O iritis    H/O orthostatic hypotension    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[facility])    High level of cardiac marker    Hip hematoma, right    Hypothermia    Insomnia    Leg edema, left    Loss of consciousness (CMS/[facility])    Low blood sugar reading    Lung nodule, multiple     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HCC)    Metabolic encephalopathy    Muscle cramps    Nodule of skin of left lower leg    Obstructive lung disease (CMS/HCC)    Osteoarthritis of left hand    Osteoarthritis of right hand    Osteopenia    Osteoporosis    Palpitations    Peripheral visual field defect    Persistent proteinuria    Positive colorectal cancer screening using Cologuard test    Benign essential HTN    Psychosis (CMS/HCC)    Pulmonary hypertension (CMS/HCC)    Refractive error    Hypertensive retinopathy    Retinal neovascularization    Secondary pulmonary arterial hypertension (CMS/HCC)    Shortness of breath on exertion    Spinal stenosis of lumbar region with neurogenic claudication    Subjective tinnitus of both ears    Swelling of right foot    Syncope and collapse    Thrombophlebitis of superficial veins of left lower extremity    Tinnitus of left ear    Ulcer of foot, left, with unspecified severity (CMS/HCC)    Vitamin D deficiency    DVT (deep venous thrombosis) (CMS/HCC)    DM type 2 with diabetic peripheral neuropathy (CMS/HCC)    Systemic lupus erythematosus (CMS/HCC)    Diabetic nephropathy (CMS/HCC)    Functional diarrhea    UTI (urinary tract infection)       General Visit Information:  Per EMR: pt presents to the emergency department with recurring weakness. Discussed with her son over the phone, who states she recently got approved for [employer], and they have had multiple episodes of hypoglycemia at home, including overnight last night when her sugars dropped into the 30s despite appropriate oral intake.  She is spent some time in rehab, and was starting to get stronger, and they have followed up outpatient with endocrine, who has not given them very many answers at this time.   General: On arrival, pt supine in bed.  Pt in no apparent distress and agreeable to therapy.  Reason for Referral: impaired cognition/safety awareness  Referred By: Bacilio Wren  Co-Treatment: OT  Prior to Session  Communication: Bedside nurse  Patient Position Received: Bed, 3 rail up, Alarm on    Home Living/PLOF:  Pt lives in house with adult children and 3 FATMATA.  Pt sleeps on first floor and walks down 12 steps to basement to bathe with WIS and bench.  Pt requires assistance from her children with getting in/out of shower.  Pt states she is indep with bed mobility and is Mod I for household ambulation using rollator.  PT states she has WC but rarely uses it.    Precautions:  Precautions  Medical Precautions: Fall precautions  Precautions Comment: contact plus precuations for c-diff r/o       Objective     Pain:  Pain Assessment  Pain Assessment: 0-10  Pain Score: 0 - No pain    Cognition:  Cognition  Overall Cognitive Status: Impaired  Orientation Level: Disoriented to time (pt states July 2022 as current month/year)    General Assessments:      Activity Tolerance  Endurance: Tolerates less than 10 min exercise, no significant change in vital signs     Static Sitting Balance  Static Sitting-Comment/Number of Minutes: fair plus  Dynamic Sitting Balance  Dynamic Sitting-Comments: fair  Static Standing Balance  Static Standing-Comment/Number of Minutes: poor  Dynamic Standing Balance  Dynamic Standing-Comments: poor    Functional Assessments:  Bed Mobility   Supine to sit: CGA with increased time and effort  Transfers  Sit to stand: x2 failed attempt despite Max A x2; pt unable to reach full stand secondary to BLE buckling  Bed to chair: squat pivot transfer with Max A x2 with therapists blocking pt's BLE from buckling  Ambulation/Gait Training  Ambulation unsafe to perform at this time    Extremity/Trunk Assessments:  RLE  ROM: difficult to assess  Strength  R knee ext: 4/5  R DF: 4/5  LLE  ROM: difficult to assess  Strength  L knee ext: 4/5  L DF: 4/5    Outcome Measures:  [facility] Basic Mobility  Turning from your back to your side while in a flat bed without using bedrails: A little  Moving from lying on your back to sitting  on the side of a flat bed without using bedrails: A little  Moving to and from bed to chair (including a wheelchair): A lot  Standing up from a chair using your arms (e.g. wheelchair or bedside chair): A lot  To walk in hospital room: Total  Climbing 3-5 steps with railing: Total  Basic Mobility - Total Score: 12      Goals:  Encounter Problems       Encounter Problems (Active)       PT Problem       Pt will be able to perform all bed mobility tasks with Min A.  (Progressing)       Start:  11/21/23    Expected End:  12/05/23            Pt will perform all transfers with Min A and FWW with proper safety mechanics.   (Progressing)       Start:  11/21/23    Expected End:  12/05/23            Pt will ambulate 15 ft with Min-Mod A using FWW for improved functional independence.  (Progressing)       Start:  11/21/23    Expected End:  12/05/23            Pt will be able to negotiate 3 steps with 1 HR with Min-Mod A.  (Progressing)       Start:  11/21/23    Expected End:  12/05/23            Pt will demonstrate good dynamic sit balance for completion of therapeutic exercises and functional tasks.  (Progressing)       Start:  11/21/23    Expected End:  12/05/23                 Education Documentation  Home Exercise Program, taught by Miranda Barreto, PT at 11/21/2023  1:35 PM.  Learner: Patient  Readiness: Acceptance  Method: Explanation  Response: Needs Reinforcement    Mobility Training, taught by Miranda Barreto, PT at 11/21/2023  1:35 PM.  Learner: Patient  Readiness: Acceptance  Method: Explanation  Response: Needs Reinforcement    Education Comments  No comments found.

## 2023-11-21 NOTE — NURSING NOTE
Upon arrival to floor blood sugar low,and has been low with q4h checks. Has been asymptomatic,does not respond to oral supplements,has needed d50 q4h. Did receive late dinner tray upon arrival to floor. Started on solucortef q6h. Had several episodes of loose stools,specimens obtained and sent to lab,is on cdiff precautions. Ct abdomen completed. Rocephin for uti. Is able to assist to turn and reposition in bed,states is non ambulatory at home. Resting comfortably at this time,call bell in reach,bed alarm on.

## 2023-11-21 NOTE — PROGRESS NOTES
Met with pt at bedside to review dc plan. Pt admitted from home where she lives with her son and daughter in law. States someone is with her at all times. Pt is primarily in a bed/chair at home but states she can walk with her walker short distances. Pt hopes to dc home rather than dc to SNF. PT/OT evals pending.

## 2023-11-22 LAB
ANION GAP SERPL CALC-SCNC: 13 MMOL/L (ref 10–20)
BUN SERPL-MCNC: 44 MG/DL (ref 6–23)
CALCIUM SERPL-MCNC: 8.7 MG/DL (ref 8.6–10.3)
CHLORIDE SERPL-SCNC: 113 MMOL/L (ref 98–107)
CO2 SERPL-SCNC: 22 MMOL/L (ref 21–32)
CREAT SERPL-MCNC: 2.05 MG/DL (ref 0.5–1.05)
ERYTHROCYTE [DISTWIDTH] IN BLOOD BY AUTOMATED COUNT: 21.2 % (ref 11.5–14.5)
GFR SERPL CREATININE-BSD FRML MDRD: 27 ML/MIN/1.73M*2
GLUCOSE BLD MANUAL STRIP-MCNC: 108 MG/DL (ref 74–99)
GLUCOSE BLD MANUAL STRIP-MCNC: 110 MG/DL (ref 74–99)
GLUCOSE BLD MANUAL STRIP-MCNC: 110 MG/DL (ref 74–99)
GLUCOSE BLD MANUAL STRIP-MCNC: 131 MG/DL (ref 74–99)
GLUCOSE BLD MANUAL STRIP-MCNC: 142 MG/DL (ref 74–99)
GLUCOSE BLD MANUAL STRIP-MCNC: 77 MG/DL (ref 74–99)
GLUCOSE SERPL-MCNC: 131 MG/DL (ref 74–99)
HCT VFR BLD AUTO: 26.7 % (ref 36–46)
HGB BLD-MCNC: 7.8 G/DL (ref 12–16)
MCH RBC QN AUTO: 29.7 PG (ref 26–34)
MCHC RBC AUTO-ENTMCNC: 29.2 G/DL (ref 32–36)
MCV RBC AUTO: 102 FL (ref 80–100)
NRBC BLD-RTO: 0.4 /100 WBCS (ref 0–0)
PLATELET # BLD AUTO: 100 X10*3/UL (ref 150–450)
POTASSIUM SERPL-SCNC: 4.7 MMOL/L (ref 3.5–5.3)
RBC # BLD AUTO: 2.63 X10*6/UL (ref 4–5.2)
SODIUM SERPL-SCNC: 143 MMOL/L (ref 136–145)
WBC # BLD AUTO: 5.1 X10*3/UL (ref 4.4–11.3)

## 2023-11-22 PROCEDURE — 36415 COLL VENOUS BLD VENIPUNCTURE: CPT

## 2023-11-22 PROCEDURE — 82947 ASSAY GLUCOSE BLOOD QUANT: CPT

## 2023-11-22 PROCEDURE — 2500000004 HC RX 250 GENERAL PHARMACY W/ HCPCS (ALT 636 FOR OP/ED)

## 2023-11-22 PROCEDURE — 2500000001 HC RX 250 WO HCPCS SELF ADMINISTERED DRUGS (ALT 637 FOR MEDICARE OP)

## 2023-11-22 PROCEDURE — 80048 BASIC METABOLIC PNL TOTAL CA: CPT

## 2023-11-22 PROCEDURE — 1100000001 HC PRIVATE ROOM DAILY

## 2023-11-22 PROCEDURE — 36415 COLL VENOUS BLD VENIPUNCTURE: CPT | Performed by: STUDENT IN AN ORGANIZED HEALTH CARE EDUCATION/TRAINING PROGRAM

## 2023-11-22 PROCEDURE — 2500000004 HC RX 250 GENERAL PHARMACY W/ HCPCS (ALT 636 FOR OP/ED): Performed by: INTERNAL MEDICINE

## 2023-11-22 PROCEDURE — 80377 DRUG/SUBSTANCE NOS 7/MORE: CPT | Performed by: STUDENT IN AN ORGANIZED HEALTH CARE EDUCATION/TRAINING PROGRAM

## 2023-11-22 PROCEDURE — 85027 COMPLETE CBC AUTOMATED: CPT

## 2023-11-22 PROCEDURE — 99233 SBSQ HOSP IP/OBS HIGH 50: CPT | Performed by: STUDENT IN AN ORGANIZED HEALTH CARE EDUCATION/TRAINING PROGRAM

## 2023-11-22 RX ORDER — SODIUM CHLORIDE, SODIUM LACTATE, POTASSIUM CHLORIDE, CALCIUM CHLORIDE 600; 310; 30; 20 MG/100ML; MG/100ML; MG/100ML; MG/100ML
100 INJECTION, SOLUTION INTRAVENOUS CONTINUOUS
Status: ACTIVE | OUTPATIENT
Start: 2023-11-22 | End: 2023-11-22

## 2023-11-22 RX ORDER — PREDNISONE 10 MG/1
10 TABLET ORAL DAILY
Status: DISCONTINUED | OUTPATIENT
Start: 2023-11-22 | End: 2023-11-24

## 2023-11-22 RX ADMIN — MYCOPHENOLATE MOFETIL 1000 MG: 250 CAPSULE ORAL at 21:30

## 2023-11-22 RX ADMIN — PANTOPRAZOLE SODIUM 40 MG: 40 TABLET, DELAYED RELEASE ORAL at 09:47

## 2023-11-22 RX ADMIN — SODIUM CHLORIDE, POTASSIUM CHLORIDE, SODIUM LACTATE AND CALCIUM CHLORIDE 100 ML/HR: 600; 310; 30; 20 INJECTION, SOLUTION INTRAVENOUS at 13:07

## 2023-11-22 RX ADMIN — APIXABAN 2.5 MG: 2.5 TABLET, FILM COATED ORAL at 21:30

## 2023-11-22 RX ADMIN — PREDNISONE 10 MG: 10 TABLET ORAL at 09:47

## 2023-11-22 RX ADMIN — APIXABAN 2.5 MG: 2.5 TABLET, FILM COATED ORAL at 09:47

## 2023-11-22 RX ADMIN — MYCOPHENOLATE MOFETIL 1000 MG: 250 CAPSULE ORAL at 09:47

## 2023-11-22 ASSESSMENT — PAIN SCALES - GENERAL
PAINLEVEL_OUTOF10: 0 - NO PAIN

## 2023-11-22 ASSESSMENT — COGNITIVE AND FUNCTIONAL STATUS - GENERAL
DRESSING REGULAR LOWER BODY CLOTHING: A LITTLE
DRESSING REGULAR LOWER BODY CLOTHING: A LITTLE
WALKING IN HOSPITAL ROOM: A LOT
TOILETING: A LITTLE
MOBILITY SCORE: 16
TOILETING: A LITTLE
PERSONAL GROOMING: A LITTLE
MOBILITY SCORE: 16
CLIMB 3 TO 5 STEPS WITH RAILING: A LOT
HELP NEEDED FOR BATHING: A LITTLE
DAILY ACTIVITIY SCORE: 19
DAILY ACTIVITIY SCORE: 19
MOVING FROM LYING ON BACK TO SITTING ON SIDE OF FLAT BED WITH BEDRAILS: A LITTLE
CLIMB 3 TO 5 STEPS WITH RAILING: A LOT
MOVING TO AND FROM BED TO CHAIR: A LITTLE
DRESSING REGULAR UPPER BODY CLOTHING: A LITTLE
STANDING UP FROM CHAIR USING ARMS: A LITTLE
DRESSING REGULAR UPPER BODY CLOTHING: A LITTLE
WALKING IN HOSPITAL ROOM: A LOT
HELP NEEDED FOR BATHING: A LITTLE
PERSONAL GROOMING: A LITTLE
MOVING TO AND FROM BED TO CHAIR: A LITTLE
STANDING UP FROM CHAIR USING ARMS: A LITTLE
TURNING FROM BACK TO SIDE WHILE IN FLAT BAD: A LITTLE
TURNING FROM BACK TO SIDE WHILE IN FLAT BAD: A LITTLE
MOVING FROM LYING ON BACK TO SITTING ON SIDE OF FLAT BED WITH BEDRAILS: A LITTLE

## 2023-11-22 ASSESSMENT — PAIN - FUNCTIONAL ASSESSMENT
PAIN_FUNCTIONAL_ASSESSMENT: 0-10
PAIN_FUNCTIONAL_ASSESSMENT: 0-10

## 2023-11-22 NOTE — NURSING NOTE
0700: Assumed care of pt. At this time     1300: Pt. Resting at this time and denies any further needs or pain. Will continue to monitor throughout the duration of this shift.    1700: Pt. Remains in the chair and has not had any loose BM's throughout this shift. The pt. Denies any pain at this time and is beginning to feel better. The pt. Has call light in reach. Nursing staff to continue to monitor this pt. Throughout the duration of this shift.

## 2023-11-22 NOTE — PROGRESS NOTES
Physical Therapy                 Therapy Communication Note    Patient Name: Bing Holliday  MRN: [id number]  Today's Date: 11/22/2023     Discipline: Physical Therapy    Missed Visit Reason: Missed Visit Reason:  (Pt eating lunch)    Missed Time: Attempt    Comment: Pt in the middle of eating lunch at this time.

## 2023-11-22 NOTE — CARE PLAN
The patient's goals for the shift include      The clinical goals for the shift include See care plan      Problem: Skin  Goal: Decreased wound size/increased tissue granulation at next dressing change  Outcome: Progressing  Flowsheets (Taken 11/22/2023 0424)  Decreased wound size/increased tissue granulation at next dressing change: Promote sleep for wound healing  Goal: Participates in plan/prevention/treatment measures  Outcome: Progressing  Flowsheets (Taken 11/22/2023 0424)  Participates in plan/prevention/treatment measures: Elevate heels  Goal: Prevent/manage excess moisture  Outcome: Progressing  Flowsheets (Taken 11/22/2023 0424)  Prevent/manage excess moisture: Moisturize dry skin  Goal: Prevent/minimize sheer/friction injuries  Outcome: Progressing  Flowsheets (Taken 11/22/2023 0424)  Prevent/minimize sheer/friction injuries: Use pull sheet  Goal: Promote/optimize nutrition  Outcome: Progressing  Flowsheets (Taken 11/22/2023 0424)  Promote/optimize nutrition: Monitor/record intake including meals  Goal: Promote skin healing  Outcome: Progressing  Flowsheets (Taken 11/22/2023 0424)  Promote skin healing: Turn/reposition every 2 hours/use positioning/transfer devices     Problem: Diabetes  Goal: Achieve decreasing blood glucose levels by end of shift  Outcome: Progressing  Goal: Increase stability of blood glucose readings by end of shift  Outcome: Progressing  Goal: Decrease in ketones present in urine by end of shift  Outcome: Progressing  Goal: Maintain electrolyte levels within acceptable range throughout shift  Outcome: Progressing  Goal: Maintain glucose levels >70mg/dl to <250mg/dl throughout shift  Outcome: Progressing  Goal: No changes in neurological exam by end of shift  Outcome: Progressing  Goal: Learn about and adhere to nutrition recommendations by end of shift  Outcome: Progressing  Goal: Vital signs within normal range for age by end of shift  Outcome: Progressing  Goal: Increase self care  and/or family involovement by end of shift  Outcome: Progressing  Goal: Receive DSME education by end of shift  Outcome: Progressing

## 2023-11-22 NOTE — PROGRESS NOTES
Bing Holliday is a 63 y.o. female on day 2 of admission presenting with Hypoglycemia.      Subjective   Patient seen and examined at bedside's morning.  It [birth year] today and she is in good spirits.  Patient appears to be mentating better compared to yesterday.  ANO x 3 on evaluation.  Reports that she continues to have watery bowel movements overnight.       Objective     Last Recorded Vitals  /63 (BP Location: Right arm, Patient Position: Lying)   Pulse 64   Temp 36.1 °C (97 °F) (Temporal)   Resp 18   Wt 94.8 kg (208 lb 15.9 oz)   SpO2 100%   Intake/Output last 3 Shifts:    Intake/Output Summary (Last 24 hours) at 11/22/2023 1203  Last data filed at 11/21/2023 1900  Gross per 24 hour   Intake 360 ml   Output --   Net 360 ml       Admission Weight  Weight: 94.6 kg (208 lb 8.9 oz) (11/20/23 1246)    Daily Weight  11/20/23 : 94.8 kg (208 lb 15.9 oz)    Image Results  CT abdomen pelvis wo IV contrast  Narrative: Interpreted By:  Deacon Munoz,   STUDY:  CT ABDOMEN PELVIS WO IV CONTRAST;  11/20/2023 10:25 pm      INDICATION:  Signs/Symptoms:epigastric/periumbilical abd pain w/ diarrhea.      COMPARISON:  8/28/2023      ACCESSION NUMBER(S):  [id number]      ORDERING CLINICIAN:  ARELIS VILLAGRAN      TECHNIQUE:  Contiguous axial images of the abdomen and pelvis were obtained  without intravenous contrast. Coronal and sagittal reformatted images  were obtained from the axial images.      FINDINGS:  There is limited evaluation of the lung bases.  Mild basilar subsegmental atelectasis.      Cardiomegaly and coronary artery atherosclerotic calcifications.      Evaluation of the abdominal viscera is limited secondary to lack of  intravenous contrast. Limited evaluation for liver mass on  noncontrast examination. The gallbladder surgically absent.      The pancreas, spleen, and adrenal glands appear unremarkable.      Evaluation of the kidneys is limited secondary to lack of intravenous  contrast. 2 mm  nonobstructive right renal calculus. No  hydronephrosis. Right renal vascular calcifications.      Atherosclerotic calcification of the abdominal aorta and bilateral  iliac arteries.      No evidence of bowel obstruction or acute appendicitis. There is  colonic diverticulosis without evidence of acute diverticulitis.  There is underdistention versus wall thickening of the ascending  colon. The colon is diffusely underdistended and not well evaluated.      Limited evaluation of the urinary bladder is underdistention and beam  hardening artifact.      Postsurgical change of right hip arthroplasty resulting in beam  hardening artifact and limiting evaluation of the pelvis. There is  stable advanced left hip osteoarthrosis with joint space loss and  subchondral sclerosis and cystic change and femoral head neck  junction osseous spurring.      Multilevel degenerative change of the lumbar spine. There is stable  multilevel degenerative endplate change. Stable mild compression  deformity of L1.      Impression: No evidence of bowel obstruction or acute appendicitis. Colon is  diffusely underdistended and not well evaluated. There is  underdistention versus wall thickening of the ascending colon which  may be infectious or inflammatory in etiology. Colonic diverticulosis  without evidence of acute diverticulitis.      Limited evaluation of the urinary bladder secondary to  underdistention and beam hardening artifact. Underdistention versus  mild urinary bladder wall thickening; please correlate with  urinalysis to exclude cystitis.      MACRO:  None      Signed by: Deacon Munoz 11/20/2023 11:26 PM  Dictation workstation:   [id number]  XR chest 1 view  Narrative: STUDY:  Chest Radiograph;  11/20/2023 3:08 PM.  INDICATION:  Weakness.  COMPARISON:  XR chest 10/19/2023.  ACCESSION NUMBER(S):  [id number]  ORDERING CLINICIAN:  BUSHRA NGUYEN  TECHNIQUE:  Frontal chest was obtained at 15:07 hours.  FINDINGS:  CARDIOMEDIASTINAL  SILHOUETTE:  Cardiomediastinal silhouette is mildly enlarged in size. The aorta is  calcified and tortuous.     LUNGS:  Lungs are mildly hypoinflated, but appear clear.     ABDOMEN:  No remarkable upper abdominal findings.     BONES:  No acute osseous changes.  Impression: Stable mild enlargement of the cardiac silhouette. Mildly hypoinflated  lungs which appear clear. Calcified and tortuous aorta.   Signed by Alan Linton MD    Results for orders placed or performed during the hospital encounter of 11/20/23 (from the past 24 hour(s))   POCT GLUCOSE   Result Value Ref Range    POCT Glucose 142 (H) 74 - 99 mg/dL   POCT GLUCOSE   Result Value Ref Range    POCT Glucose 125 (H) 74 - 99 mg/dL   POCT GLUCOSE   Result Value Ref Range    POCT Glucose 96 74 - 99 mg/dL   POCT GLUCOSE   Result Value Ref Range    POCT Glucose 137 (H) 74 - 99 mg/dL   POCT GLUCOSE   Result Value Ref Range    POCT Glucose 77 74 - 99 mg/dL   POCT GLUCOSE   Result Value Ref Range    POCT Glucose 131 (H) 74 - 99 mg/dL   CBC   Result Value Ref Range    WBC 5.1 4.4 - 11.3 x10*3/uL    nRBC 0.4 (H) 0.0 - 0.0 /100 WBCs    RBC 2.63 (L) 4.00 - 5.20 x10*6/uL    Hemoglobin 7.8 (L) 12.0 - 16.0 g/dL    Hematocrit 26.7 (L) 36.0 - 46.0 %     (H) 80 - 100 fL    MCH 29.7 26.0 - 34.0 pg    MCHC 29.2 (L) 32.0 - 36.0 g/dL    RDW 21.2 (H) 11.5 - 14.5 %    Platelets 100 (L) 150 - 450 x10*3/uL   Basic metabolic panel   Result Value Ref Range    Glucose 131 (H) 74 - 99 mg/dL    Sodium 143 136 - 145 mmol/L    Potassium 4.7 3.5 - 5.3 mmol/L    Chloride 113 (H) 98 - 107 mmol/L    Bicarbonate 22 21 - 32 mmol/L    Anion Gap 13 10 - 20 mmol/L    Urea Nitrogen 44 (H) 6 - 23 mg/dL    Creatinine 2.05 (H) 0.50 - 1.05 mg/dL    eGFR 27 (L) >60 mL/min/1.73m*2    Calcium 8.7 8.6 - 10.3 mg/dL   POCT GLUCOSE   Result Value Ref Range    POCT Glucose 142 (H) 74 - 99 mg/dL   POCT GLUCOSE   Result Value Ref Range    POCT Glucose 108 (H) 74 - 99 mg/dL         Physical  Exam    Constitutional: Well-appearing, alert, in no acute distress  CV: Regular rate and rhythm, no murmurs or gallops appreciated, distal pulses intact   Pulmonary: Breath sounds clear to auscultation bilaterally, no rales, rhonchi, wheezing  GI: Abdomen soft, nondistended, nontender to palpation.  No guarding or rebound tenderness noted.  MSK: Range of motion intact in all 4 extremities  Extremities: No edema noted  Neuro: ANO x3, no focal neurologic deficits  Psych: Well-appearing, pleasant  Skin: Warm and dry, no erythema skin lesions noted.     Assessment/Plan      62-year-old female with a history of non-insulin-dependent type 2 diabetes, CKD 3, SLE C/P lupus cerebritis, Jacuzzis arthropathy, adrenal insufficiency on fludrocortisone who presents to the hospital with hypoglycemia and diarrhea.  Patient takes Florinef 0.1 mg every other day in addition to prednisone 15 mg daily, suspected medication noncompliance which is likely leading to hypotension and hypoglycemia seen on admission.  Patient also did test positive for norovirus on this admission and that she has been having frequent diarrhea which is likely causing her to be volume depleted thus leading to hypotension.  Endocrinology consulted, suggested that patient likely has secondary adrenal insufficiency in the setting of chronic prednisone use.  Recommended discontinuing Florinef, switching to daily prednisone 10 mg to be taken in the morning.  PT/OT consulted, patient will likely be going to SNF on discharge for further care.    Secondary adrenal insufficiency  Hypoglycemia  SLE on CellCept/prednisone   -Endocrinology consulted-suspect that decreased nutrition status along with hypoadrenalism likely leading to hypoglycemia along with medication noncompliance and diarrhea  -Florinef discontinued, prednisone 10 mg daily to be taken in the morning  -Glucose levels improved and BP stable, will continue to monitor  -Anticipate that patient will be stable  for discharge, PT/OT evaluation pending and patient will be going to SNF    Norovirus infection  Acute diarrhea 2/2 above, prior history of recurrent C. difficile infection  -Continues to have liquid bowel movements overnight  -Continue supportive care, IV fluids    JED, likely prerenal in the setting of diarrhea  -Creatinine 2.05 this morning, elevated from 1.6 (baseline)  -1 L maintenance fluids ordered  -Continue to monitor renal function    Pancytopenia  -CBC appears to be at baseline, bone marrow biopsy from 2020 suggesting MDS.  No evidence of bleeding, no blast noted on differential    Asymptomatic bacteriuria  -Patient is largely asymptomatic, given history of prior C. difficile infection will elect to discontinue treatment.    Diet: Regular   DVT prophylaxis: Eliquis  Consults: Endocrine   CODE STATUS: Full    Disposition: Patient clinically improved, pending PT/OT evaluation for discharge to SNF once approved.    Discussed with attending.    Nash Mohamud DO  Internal Medicine, PGY-3       -------------------------  Attending Addendum  -------------------------     Seen and examined with resident physicians.  Labs and imaging personally reviewed.  This is a 62-year-old female known to this physician with a history of lupus cerebritis, G-codes arthropathy, SLE, type 2 diabetes with labile blood glucose, secondary adrenal insufficiency from prolonged steroid administration, CKD 3, paroxysmal atrial fibrillation on Eliquis, primary hypertension, MDS who presents with weakness, hypoglycemia and altered mentation from home found to have a blood sugar of 30 on home CGM, on arrival blood sugar 58 without much improvement with administration of D50. Overnight following admission had soft pressures with systolic blood pressures in the 90s, persistent hypoglycemia, likely secondary to adrenal insufficiency/adrenal crisis and was started on stress dose steroids with rapid improvement of her blood pressure and blood  sugar.      Admitted for management of hypoglycemia and altered mentation which has improved with steroids, now on prednisone 10 mg every day from 15mg she was taking as an outpatient.  Fludrocortisone has been held and blood pressures have been stable.  Originally believed her presentation was due to noncompliance with prednisone with subsequent hypocortisolism.  However, she did test positive for norovirus today and this is likely the adrenergic stress that precipitated the adrenal crisis.  Today her stools have been decreasing in frequency and she feels much improved overall.     On my exam at the morning the patient is resting comfortably in bed, she responds appropriately, she is well-perfused, no acute distress, oxygenating well on room air, moving all limbs, no facial asymmetry.     Secondary adrenal insufficiency  Hypoglycemia  SLE on CellCept/prednisone 5 mg 3 times daily  Adrenal crisis precipitated by gastroenteritis/norovirus infection.  C. difficile testing negative.  Endocrinology evaluated patient yesterday evening to determine cause of marked hypoglycemia out of proportion to the usual hypoglycemia seen in adrenal crisis and recurrent hypoglycemia on previous admissions.  Considering her severe protein calorie malnutrition, it is believed that her glycation stores are quite depleted causing a traumatic hypoglycemia when she becomes hypocortisolemic.  In the future, she will benefit from improved nutrition and increasing her steroid doses when she becomes hypoglycemic at home and/or faces a physiologically stressing event.  Agree with endocrinology that her previous prednisone 3 times daily dosing could lead to noncompliance and was nonphysiologic, agree that since prednisone is a 24-hour medication she should only take this medication once a day, will continue prednisone 10 mg daily that was started by endocrinology and follow patient closely for any signs of hypercortisolism.  Currently, she is  stable, mentation and blood sugars and improved and she is medically stable for discharge to a rehab facility when accepted.     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  No blasts on differential.  No evidence of bleeding     Asymptomatic bacteriuria  History of recurrent C. difficile associated diarrhea  Norovirus  Received antibiotics on admission for possible UTI however low suspicion and considering patient has a history of C. difficile associated diarrhea and norovirus infection will hold further abx and follow patient closely for any signs of urinary tract infection and/or C. difficile infection.  If patient has loose stools with evidence of hemodynamic instability, renal failure, decompensation and/or severe leukocytosis will have additional C. difficile PCR sent for analysis.  If positive will need pulsed dosed tapering vancomycin or perhaps fidaxomicin.      Regarding chronic medical conditions please see resident documentation     Case discussed with case management, residents and patient.     Complexity: High     Total visit time = > 50 minutes; more than 50% spent counseling/coordinating care     I saw and evaluated the patient. I personally obtained the key and critical portions of the history and physical exam or was physically present for key and critical portions performed by the resident/fellow. I reviewed the resident/fellow's documentation and discussed the patient with the resident/fellow. I agree with the resident/fellow's medical decision making as documented in the note.     Luis Wren, DO

## 2023-11-22 NOTE — NURSING NOTE
EOS:    1900- assumed care of pt    2100- PO medications administered, scheduled respiratory tx given to pt this hour. BG stable at this time.    0000- BG stable at this time, will continue to monitor.     0200- Pt with incontinence in chair, pt ambulated to BC with 2x with walker, pt given candido care and ambulated to bed at this time.    0400- Pt  at this time, vitals stable, pt resting in bed with no complaints of pain/discomfort. Will continue to monitor.    0600- pt resting in bed at this time, pt without reports of pain/discomfort. Call light within reach at all times, bed/chair alarm active, safety maintained.

## 2023-11-22 NOTE — PROGRESS NOTES
[facility] 12 Spoke with patient. She has been at [facility] in the past and would like to be discharged there. [facility] notified to send referral .  [facility] is still reviewing. Requested an update.  [facility] accepted. Requested precert be started.   Irma Gutiérrez RN

## 2023-11-23 LAB
ACTH PLAS-MCNC: 2.5 PG/ML (ref 7.2–63.3)
ALBUMIN SERPL BCP-MCNC: 3.1 G/DL (ref 3.4–5)
ANION GAP SERPL CALC-SCNC: 12 MMOL/L (ref 10–20)
BACTERIA UR CULT: ABNORMAL
BUN SERPL-MCNC: 38 MG/DL (ref 6–23)
CALCIUM SERPL-MCNC: 8.6 MG/DL (ref 8.6–10.3)
CHLORIDE SERPL-SCNC: 114 MMOL/L (ref 98–107)
CO2 SERPL-SCNC: 23 MMOL/L (ref 21–32)
CREAT SERPL-MCNC: 1.91 MG/DL (ref 0.5–1.05)
ERYTHROCYTE [DISTWIDTH] IN BLOOD BY AUTOMATED COUNT: 21.2 % (ref 11.5–14.5)
GFR SERPL CREATININE-BSD FRML MDRD: 29 ML/MIN/1.73M*2
GLUCOSE BLD MANUAL STRIP-MCNC: 109 MG/DL (ref 74–99)
GLUCOSE BLD MANUAL STRIP-MCNC: 142 MG/DL (ref 74–99)
GLUCOSE BLD MANUAL STRIP-MCNC: 85 MG/DL (ref 74–99)
GLUCOSE BLD MANUAL STRIP-MCNC: 97 MG/DL (ref 74–99)
GLUCOSE SERPL-MCNC: 97 MG/DL (ref 74–99)
HCT VFR BLD AUTO: 28 % (ref 36–46)
HGB BLD-MCNC: 8.2 G/DL (ref 12–16)
MCH RBC QN AUTO: 29.2 PG (ref 26–34)
MCHC RBC AUTO-ENTMCNC: 29.3 G/DL (ref 32–36)
MCV RBC AUTO: 100 FL (ref 80–100)
NRBC BLD-RTO: 0.4 /100 WBCS (ref 0–0)
PHOSPHATE SERPL-MCNC: 3.2 MG/DL (ref 2.5–4.9)
PLATELET # BLD AUTO: 97 X10*3/UL (ref 150–450)
POTASSIUM SERPL-SCNC: 4.3 MMOL/L (ref 3.5–5.3)
RBC # BLD AUTO: 2.81 X10*6/UL (ref 4–5.2)
SODIUM SERPL-SCNC: 145 MMOL/L (ref 136–145)
WBC # BLD AUTO: 4.6 X10*3/UL (ref 4.4–11.3)

## 2023-11-23 PROCEDURE — 99233 SBSQ HOSP IP/OBS HIGH 50: CPT | Performed by: STUDENT IN AN ORGANIZED HEALTH CARE EDUCATION/TRAINING PROGRAM

## 2023-11-23 PROCEDURE — 80069 RENAL FUNCTION PANEL: CPT

## 2023-11-23 PROCEDURE — 85027 COMPLETE CBC AUTOMATED: CPT

## 2023-11-23 PROCEDURE — 2500000001 HC RX 250 WO HCPCS SELF ADMINISTERED DRUGS (ALT 637 FOR MEDICARE OP)

## 2023-11-23 PROCEDURE — 1100000001 HC PRIVATE ROOM DAILY

## 2023-11-23 PROCEDURE — 36415 COLL VENOUS BLD VENIPUNCTURE: CPT

## 2023-11-23 PROCEDURE — 2500000004 HC RX 250 GENERAL PHARMACY W/ HCPCS (ALT 636 FOR OP/ED)

## 2023-11-23 PROCEDURE — 82947 ASSAY GLUCOSE BLOOD QUANT: CPT

## 2023-11-23 PROCEDURE — 2500000004 HC RX 250 GENERAL PHARMACY W/ HCPCS (ALT 636 FOR OP/ED): Performed by: INTERNAL MEDICINE

## 2023-11-23 RX ADMIN — MYCOPHENOLATE MOFETIL 1000 MG: 250 CAPSULE ORAL at 09:51

## 2023-11-23 RX ADMIN — PANTOPRAZOLE SODIUM 40 MG: 40 TABLET, DELAYED RELEASE ORAL at 09:51

## 2023-11-23 RX ADMIN — APIXABAN 2.5 MG: 2.5 TABLET, FILM COATED ORAL at 22:02

## 2023-11-23 RX ADMIN — PREDNISONE 10 MG: 10 TABLET ORAL at 09:51

## 2023-11-23 RX ADMIN — APIXABAN 2.5 MG: 2.5 TABLET, FILM COATED ORAL at 09:51

## 2023-11-23 RX ADMIN — MYCOPHENOLATE MOFETIL 1000 MG: 250 CAPSULE ORAL at 22:02

## 2023-11-23 ASSESSMENT — PAIN - FUNCTIONAL ASSESSMENT
PAIN_FUNCTIONAL_ASSESSMENT: 0-10
PAIN_FUNCTIONAL_ASSESSMENT: 0-10

## 2023-11-23 ASSESSMENT — COGNITIVE AND FUNCTIONAL STATUS - GENERAL
MOVING FROM LYING ON BACK TO SITTING ON SIDE OF FLAT BED WITH BEDRAILS: A LITTLE
TOILETING: A LITTLE
DRESSING REGULAR LOWER BODY CLOTHING: A LITTLE
TOILETING: A LITTLE
MOBILITY SCORE: 17
MOVING TO AND FROM BED TO CHAIR: A LITTLE
STANDING UP FROM CHAIR USING ARMS: A LITTLE
MOBILITY SCORE: 17
DRESSING REGULAR UPPER BODY CLOTHING: A LITTLE
MOVING TO AND FROM BED TO CHAIR: A LITTLE
CLIMB 3 TO 5 STEPS WITH RAILING: A LOT
HELP NEEDED FOR BATHING: A LITTLE
PERSONAL GROOMING: A LITTLE
DAILY ACTIVITIY SCORE: 19
WALKING IN HOSPITAL ROOM: A LITTLE
HELP NEEDED FOR BATHING: A LITTLE
DRESSING REGULAR UPPER BODY CLOTHING: A LITTLE
DAILY ACTIVITIY SCORE: 19
DRESSING REGULAR LOWER BODY CLOTHING: A LITTLE
TURNING FROM BACK TO SIDE WHILE IN FLAT BAD: A LITTLE
MOVING FROM LYING ON BACK TO SITTING ON SIDE OF FLAT BED WITH BEDRAILS: A LITTLE
TURNING FROM BACK TO SIDE WHILE IN FLAT BAD: A LITTLE
PERSONAL GROOMING: A LITTLE
CLIMB 3 TO 5 STEPS WITH RAILING: A LOT
WALKING IN HOSPITAL ROOM: A LITTLE
STANDING UP FROM CHAIR USING ARMS: A LITTLE

## 2023-11-23 ASSESSMENT — PAIN SCALES - GENERAL
PAINLEVEL_OUTOF10: 0 - NO PAIN

## 2023-11-23 NOTE — PROGRESS NOTES
"    Endocrinology Inpatient Consult Progress Note     PATIENT NAME: Bing Holliday  MRN: [id number]  DATE: 11/23/2023    CONSULTING PHYSICIAN: Dr. Wren  REASON FOR CONSULT: AI.  T2DM.      Interval Events     Patient is feeling much better.  No diarrhea.  Tolerating her meals.  She states that she was started on fludrocortisone here at this hospital.  She does not think that Dr. Chi started it.  She denies any palpitations, diaphoresis, or lightheadedness.  Denies any nausea or vomiting.      Physical Examination     /65 (BP Location: Left arm, Patient Position: Lying)   Pulse 55   Temp 35.8 °C (96.4 °F) (Temporal)   Resp 16   Ht 1.803 m (5' 11\")   Wt 94.8 kg (208 lb 15.9 oz)   LMP  (LMP Unknown)   SpO2 100%   BMI 29.15 kg/m²   No acute distress.  Temporal wasting.  Regular rate and rhythm.  Nonlabored respiration.  Soft nontender nondistended abdomen.  No pedal edema bilaterally.  Appropriate affect.      Medications     Reviewed MAR       Data     Recent Labs and Imaging Reviewed      Assessment / Plan        # Hypoglycemia  Likely due to hypoadrenalism and possible glucocorticoid noncompliance.  This has not recurred since the patient has been started on glucocorticoids.  Patient received a dose of prednisone 10 mg yesterday morning.  She states that she feels well.  I explained to her the changes in her glucocorticoids as per the problem below.  Continue to hold all therapies given problem #1.     # Uncontrolled Type 2 Diabetes Mellitus  Home Regimen: None.  Hemoglobin A1c (7/23): 6.9%  Nutrtion: Regular Diet.     Continue to hold all therapies given problem #1.  I did explain to her the caveat that continuous glucose monitors do not work well in the hypoglycemia range.     # Adrenal Insufficiency  Please see my initial consult note for my clinical formulation.  This is likely secondary adrenal insufficiency or ending of chronic local corticoids use.  From my standpoint there is no indication " for mineralocorticoid agonist.  I have discontinued her fludrocortisone.  We have simplified her glucocorticoid regimen by placing her at 10 mg in the morning.  I explained to her this change.  She was amenable.  I explained to her sick day rules.  The rule of threes.  Triple her dose for 3 days with any febrile illness.  I explained to her that she should come to the ER if she is unable to keep down her prednisone.  I have explained to her that she should obtain a medical alert bracelet signifying her adrenal insufficiency.  Her blood pressures have looked great since admission.  Her hypoglycemia has resolved.     # Osteoporosis  In the setting of chronic glucocorticoid use.  This will be further managed as an outpatient.  She probably would benefit from a bisphosphonate.         Avi Sloan, DO  Endocrinology, Diabetes, and Metabolism    Available via [employer] Messenger    Please excuse and typographical or unwanted errors within this documentation as voice recognition software was used to dictate this note.

## 2023-11-23 NOTE — CARE PLAN
The patient's goals for the shift include      The clinical goals for the shift include see care plan

## 2023-11-23 NOTE — PROGRESS NOTES
Bing Holliday is a 63 y.o. female on day 3 of admission presenting with Hypoglycemia.      Subjective   No acute events overnight.  Patient evaluated at bedside this morning.  States that her diarrhea is improved, no further episodes this morning.  Blood sugars remain stable, and the patient expresses understanding of taking stress dose steroids at home if she becomes hypoglycemic or hypotensive.  Otherwise continues to await precertification for SNF.       Objective     Last Recorded Vitals  /68   Pulse 76   Temp 35.6 °C (96.1 °F)   Resp 18   Wt 94.8 kg (208 lb 15.9 oz)   SpO2 96%   Intake/Output last 3 Shifts:    Intake/Output Summary (Last 24 hours) at 11/23/2023 1236  Last data filed at 11/23/2023 1203  Gross per 24 hour   Intake 420 ml   Output --   Net 420 ml       Admission Weight  Weight: 94.6 kg (208 lb 8.9 oz) (11/20/23 1246)    Daily Weight  11/20/23 : 94.8 kg (208 lb 15.9 oz)      Scheduled medications  apixaban, 2.5 mg, oral, BID  budesonide, 0.25 mg, nebulization, BID  ipratropium-albuteroL, 3 mL, nebulization, BID  mycophenolate, 1,000 mg, oral, BID  pantoprazole, 40 mg, oral, Daily  predniSONE, 10 mg, oral, Daily      Continuous medications     PRN medications  PRN medications: acetaminophen, dextrose 10 % in water (D10W), dextrose, glucagon, ipratropium-albuteroL     Physical Exam  Constitutional: Well developed, awake/alert/oriented x4, no distress, alert and cooperative  Skin: Warm and dry, no lesions, no rashes  Eyes: Anicteric, clear sclera  ENMT: mucous membranes moist, no apparent injury, no lesions seen  Head/Neck: Atraumatic  Respiratory: Lungs clear to auscultation bilaterally with no wheezes, rhonci or crackles  CV: Regular rate and rhythm, no murmurs or gallops auscultated  GI: Non-tender to deep palpation, no guarding  MSK: no joint swelling  Extremities: normal extremities, no cyanosis edema, contusions or wounds, no clubbing  Psych: Appropriate mood and behavior      Relevant Results  Results for orders placed or performed during the hospital encounter of 11/20/23 (from the past 24 hour(s))   POCT GLUCOSE   Result Value Ref Range    POCT Glucose 110 (H) 74 - 99 mg/dL   POCT GLUCOSE   Result Value Ref Range    POCT Glucose 110 (H) 74 - 99 mg/dL   POCT GLUCOSE   Result Value Ref Range    POCT Glucose 142 (H) 74 - 99 mg/dL   POCT GLUCOSE   Result Value Ref Range    POCT Glucose 85 74 - 99 mg/dL   CBC   Result Value Ref Range    WBC 4.6 4.4 - 11.3 x10*3/uL    nRBC 0.4 (H) 0.0 - 0.0 /100 WBCs    RBC 2.81 (L) 4.00 - 5.20 x10*6/uL    Hemoglobin 8.2 (L) 12.0 - 16.0 g/dL    Hematocrit 28.0 (L) 36.0 - 46.0 %     80 - 100 fL    MCH 29.2 26.0 - 34.0 pg    MCHC 29.3 (L) 32.0 - 36.0 g/dL    RDW 21.2 (H) 11.5 - 14.5 %    Platelets 97 (L) 150 - 450 x10*3/uL   POCT GLUCOSE   Result Value Ref Range    POCT Glucose 97 74 - 99 mg/dL   Renal function panel   Result Value Ref Range    Glucose 97 74 - 99 mg/dL    Sodium 145 136 - 145 mmol/L    Potassium 4.3 3.5 - 5.3 mmol/L    Chloride 114 (H) 98 - 107 mmol/L    Bicarbonate 23 21 - 32 mmol/L    Anion Gap 12 10 - 20 mmol/L    Urea Nitrogen 38 (H) 6 - 23 mg/dL    Creatinine 1.91 (H) 0.50 - 1.05 mg/dL    eGFR 29 (L) >60 mL/min/1.73m*2    Calcium 8.6 8.6 - 10.3 mg/dL    Phosphorus 3.2 2.5 - 4.9 mg/dL    Albumin 3.1 (L) 3.4 - 5.0 g/dL        Image Results  CT abdomen pelvis wo IV contrast  Narrative: Interpreted By:  Deacon Munoz,   STUDY:  CT ABDOMEN PELVIS WO IV CONTRAST;  11/20/2023 10:25 pm      INDICATION:  Signs/Symptoms:epigastric/periumbilical abd pain w/ diarrhea.      COMPARISON:  8/28/2023      ACCESSION NUMBER(S):  [id number]      ORDERING CLINICIAN:  ARELIS VILLAGRAN      TECHNIQUE:  Contiguous axial images of the abdomen and pelvis were obtained  without intravenous contrast. Coronal and sagittal reformatted images  were obtained from the axial images.      FINDINGS:  There is limited evaluation of the lung bases.  Mild basilar  subsegmental atelectasis.      Cardiomegaly and coronary artery atherosclerotic calcifications.      Evaluation of the abdominal viscera is limited secondary to lack of  intravenous contrast. Limited evaluation for liver mass on  noncontrast examination. The gallbladder surgically absent.      The pancreas, spleen, and adrenal glands appear unremarkable.      Evaluation of the kidneys is limited secondary to lack of intravenous  contrast. 2 mm nonobstructive right renal calculus. No  hydronephrosis. Right renal vascular calcifications.      Atherosclerotic calcification of the abdominal aorta and bilateral  iliac arteries.      No evidence of bowel obstruction or acute appendicitis. There is  colonic diverticulosis without evidence of acute diverticulitis.  There is underdistention versus wall thickening of the ascending  colon. The colon is diffusely underdistended and not well evaluated.      Limited evaluation of the urinary bladder is underdistention and beam  hardening artifact.      Postsurgical change of right hip arthroplasty resulting in beam  hardening artifact and limiting evaluation of the pelvis. There is  stable advanced left hip osteoarthrosis with joint space loss and  subchondral sclerosis and cystic change and femoral head neck  junction osseous spurring.      Multilevel degenerative change of the lumbar spine. There is stable  multilevel degenerative endplate change. Stable mild compression  deformity of L1.      Impression: No evidence of bowel obstruction or acute appendicitis. Colon is  diffusely underdistended and not well evaluated. There is  underdistention versus wall thickening of the ascending colon which  may be infectious or inflammatory in etiology. Colonic diverticulosis  without evidence of acute diverticulitis.      Limited evaluation of the urinary bladder secondary to  underdistention and beam hardening artifact. Underdistention versus  mild urinary bladder wall thickening; please  correlate with  urinalysis to exclude cystitis.      MACRO:  None      Signed by: Deacon Munoz 11/20/2023 11:26 PM  Dictation workstation:   [id number]  XR chest 1 view  Narrative: STUDY:  Chest Radiograph;  11/20/2023 3:08 PM.  INDICATION:  Weakness.  COMPARISON:  XR chest 10/19/2023.  ACCESSION NUMBER(S):  [id number]  ORDERING CLINICIAN:  BUSHRA NGUYEN  TECHNIQUE:  Frontal chest was obtained at 15:07 hours.  FINDINGS:  CARDIOMEDIASTINAL SILHOUETTE:  Cardiomediastinal silhouette is mildly enlarged in size. The aorta is  calcified and tortuous.     LUNGS:  Lungs are mildly hypoinflated, but appear clear.     ABDOMEN:  No remarkable upper abdominal findings.     BONES:  No acute osseous changes.  Impression: Stable mild enlargement of the cardiac silhouette. Mildly hypoinflated  lungs which appear clear. Calcified and tortuous aorta.   Signed by Alan Linton MD           Assessment/Plan   62-year-old female presenting for hypoglycemia and recurrent diarrhea.  Found to be norovirus positive, symptomatic management with resolution of symptoms on 11/23.  Evaluated by endocrinology due to recurrent history of hypoglycemia, suspect secondary to secondary adrenal insufficiency, for which her fludrocortisone was discontinued and prednisone adjusted to 10 mg daily.  Evaluated by PT/OT, and pending precertification to SNF for discharge.    # Secondary adrenal insufficiency  # SLE on CellCept/prednisone  -Endocrinology consulted, appreciate recommendations.  Steroid regimen adjusted to prednisone 10 mg daily.  Educated patient on taking further stress dosing if hypotensive or hypoglycemic at home    # Norovirus infection  -Symptoms resolved 11/23, if remains asymptomatic for 48 hours, can remove precautions on 11/25    # JED  -Creatinine today 1.91, with known baseline of around 1.6.  Expect further improvement as her diarrhea has resolved today, will continue to monitor    # Pancytopenia  -CBC appears to be at baseline,  bone marrow biopsy from 2020 suggesting MDS.  No evidence of bleeding, no blast noted on differential     # Asymptomatic bacteriuria  -Patient is largely asymptomatic, given history of prior C. difficile infection will elect to not continue treatment    DVT prophylaxis: Eliquis  Consults: Endocrinology    Dispo: Medically clear for discharge, pending precertification to SNF.       To be staffed with attending,  López Pyle, DO  Internal Medicine PGY-3

## 2023-11-23 NOTE — NURSING NOTE
Patient's IV was occluded and teaching service was ok with leaving it out.  Isolation precautions maintained.  She is up with STA to the chair.

## 2023-11-23 NOTE — NURSING NOTE
No acute changes, VSS. No complaints of pain. Remained in chair during shift. Call light within reach,lights on even rise and fall of chest noted

## 2023-11-24 LAB
ALBUMIN SERPL BCP-MCNC: 3 G/DL (ref 3.4–5)
ANION GAP SERPL CALC-SCNC: 11 MMOL/L (ref 10–20)
BUN SERPL-MCNC: 36 MG/DL (ref 6–23)
CALCIUM SERPL-MCNC: 8.6 MG/DL (ref 8.6–10.3)
CHLORIDE SERPL-SCNC: 114 MMOL/L (ref 98–107)
CO2 SERPL-SCNC: 25 MMOL/L (ref 21–32)
CREAT SERPL-MCNC: 1.93 MG/DL (ref 0.5–1.05)
ERYTHROCYTE [DISTWIDTH] IN BLOOD BY AUTOMATED COUNT: 21.2 % (ref 11.5–14.5)
GFR SERPL CREATININE-BSD FRML MDRD: 29 ML/MIN/1.73M*2
GLUCOSE BLD MANUAL STRIP-MCNC: 101 MG/DL (ref 74–99)
GLUCOSE BLD MANUAL STRIP-MCNC: 111 MG/DL (ref 74–99)
GLUCOSE BLD MANUAL STRIP-MCNC: 139 MG/DL (ref 74–99)
GLUCOSE BLD MANUAL STRIP-MCNC: 154 MG/DL (ref 74–99)
GLUCOSE BLD MANUAL STRIP-MCNC: 50 MG/DL (ref 74–99)
GLUCOSE BLD MANUAL STRIP-MCNC: 59 MG/DL (ref 74–99)
GLUCOSE BLD MANUAL STRIP-MCNC: 99 MG/DL (ref 74–99)
GLUCOSE SERPL-MCNC: 74 MG/DL (ref 74–99)
HCT VFR BLD AUTO: 26.2 % (ref 36–46)
HGB BLD-MCNC: 7.7 G/DL (ref 12–16)
INSULIN AB SER IA-ACNC: <0.4 U/ML (ref 0–0.4)
MCH RBC QN AUTO: 29.7 PG (ref 26–34)
MCHC RBC AUTO-ENTMCNC: 29.4 G/DL (ref 32–36)
MCV RBC AUTO: 101 FL (ref 80–100)
NRBC BLD-RTO: 0.9 /100 WBCS (ref 0–0)
PHOSPHATE SERPL-MCNC: 3.3 MG/DL (ref 2.5–4.9)
PLATELET # BLD AUTO: 104 X10*3/UL (ref 150–450)
POTASSIUM SERPL-SCNC: 5.1 MMOL/L (ref 3.5–5.3)
RBC # BLD AUTO: 2.59 X10*6/UL (ref 4–5.2)
SODIUM SERPL-SCNC: 145 MMOL/L (ref 136–145)
WBC # BLD AUTO: 4.3 X10*3/UL (ref 4.4–11.3)

## 2023-11-24 PROCEDURE — 80069 RENAL FUNCTION PANEL: CPT | Performed by: STUDENT IN AN ORGANIZED HEALTH CARE EDUCATION/TRAINING PROGRAM

## 2023-11-24 PROCEDURE — 1100000001 HC PRIVATE ROOM DAILY

## 2023-11-24 PROCEDURE — 94640 AIRWAY INHALATION TREATMENT: CPT

## 2023-11-24 PROCEDURE — 36415 COLL VENOUS BLD VENIPUNCTURE: CPT | Performed by: STUDENT IN AN ORGANIZED HEALTH CARE EDUCATION/TRAINING PROGRAM

## 2023-11-24 PROCEDURE — 96372 THER/PROPH/DIAG INJ SC/IM: CPT

## 2023-11-24 PROCEDURE — 2500000001 HC RX 250 WO HCPCS SELF ADMINISTERED DRUGS (ALT 637 FOR MEDICARE OP)

## 2023-11-24 PROCEDURE — 82947 ASSAY GLUCOSE BLOOD QUANT: CPT

## 2023-11-24 PROCEDURE — 99233 SBSQ HOSP IP/OBS HIGH 50: CPT | Performed by: STUDENT IN AN ORGANIZED HEALTH CARE EDUCATION/TRAINING PROGRAM

## 2023-11-24 PROCEDURE — 85027 COMPLETE CBC AUTOMATED: CPT | Performed by: STUDENT IN AN ORGANIZED HEALTH CARE EDUCATION/TRAINING PROGRAM

## 2023-11-24 PROCEDURE — 2500000004 HC RX 250 GENERAL PHARMACY W/ HCPCS (ALT 636 FOR OP/ED): Performed by: STUDENT IN AN ORGANIZED HEALTH CARE EDUCATION/TRAINING PROGRAM

## 2023-11-24 PROCEDURE — 2500000004 HC RX 250 GENERAL PHARMACY W/ HCPCS (ALT 636 FOR OP/ED): Mod: JZ

## 2023-11-24 RX ORDER — PREDNISONE 5 MG/1
15 TABLET ORAL DAILY
Status: DISCONTINUED | OUTPATIENT
Start: 2023-11-24 | End: 2023-11-25

## 2023-11-24 RX ADMIN — PANTOPRAZOLE SODIUM 40 MG: 40 TABLET, DELAYED RELEASE ORAL at 09:00

## 2023-11-24 RX ADMIN — APIXABAN 2.5 MG: 2.5 TABLET, FILM COATED ORAL at 21:40

## 2023-11-24 RX ADMIN — MYCOPHENOLATE MOFETIL 1000 MG: 250 CAPSULE ORAL at 09:00

## 2023-11-24 RX ADMIN — APIXABAN 2.5 MG: 2.5 TABLET, FILM COATED ORAL at 09:00

## 2023-11-24 RX ADMIN — PREDNISONE 15 MG: 5 TABLET ORAL at 09:00

## 2023-11-24 RX ADMIN — MYCOPHENOLATE MOFETIL 1000 MG: 250 CAPSULE ORAL at 21:40

## 2023-11-24 RX ADMIN — GLUCAGON 1 MG: KIT at 09:00

## 2023-11-24 ASSESSMENT — COGNITIVE AND FUNCTIONAL STATUS - GENERAL
DRESSING REGULAR UPPER BODY CLOTHING: A LITTLE
WALKING IN HOSPITAL ROOM: A LITTLE
DAILY ACTIVITIY SCORE: 19
TURNING FROM BACK TO SIDE WHILE IN FLAT BAD: A LITTLE
HELP NEEDED FOR BATHING: A LITTLE
STANDING UP FROM CHAIR USING ARMS: A LITTLE
CLIMB 3 TO 5 STEPS WITH RAILING: A LOT
PERSONAL GROOMING: A LITTLE
MOVING TO AND FROM BED TO CHAIR: A LITTLE
MOVING FROM LYING ON BACK TO SITTING ON SIDE OF FLAT BED WITH BEDRAILS: A LITTLE
TOILETING: A LITTLE
MOBILITY SCORE: 17
DRESSING REGULAR LOWER BODY CLOTHING: A LITTLE

## 2023-11-24 ASSESSMENT — PAIN - FUNCTIONAL ASSESSMENT: PAIN_FUNCTIONAL_ASSESSMENT: 0-10

## 2023-11-24 ASSESSMENT — PAIN SCALES - GENERAL: PAINLEVEL_OUTOF10: 0 - NO PAIN

## 2023-11-24 NOTE — PROGRESS NOTES
Bing Holliday is a 63 y.o. female on day 4 of admission presenting with Hypoglycemia.      Subjective   No acute events overnight.  Hypoglycemic this morning glucose 59, glucagon was administered.  Patient was asymptomatic during this time.  No further episodes of diarrhea, only loose stool.  Otherwise no complaints at this time, pending precertification.       Objective     Last Recorded Vitals  /81 (BP Location: Left arm, Patient Position: Lying)   Pulse 50   Temp 36 °C (96.8 °F) (Temporal)   Resp 16   Wt 94.8 kg (208 lb 15.9 oz)   SpO2 99%   Intake/Output last 3 Shifts:    Intake/Output Summary (Last 24 hours) at 11/24/2023 1118  Last data filed at 11/24/2023 0800  Gross per 24 hour   Intake 420 ml   Output 201 ml   Net 219 ml         Admission Weight  Weight: 94.6 kg (208 lb 8.9 oz) (11/20/23 1246)    Daily Weight  11/20/23 : 94.8 kg (208 lb 15.9 oz)      Scheduled medications  apixaban, 2.5 mg, oral, BID  budesonide, 0.25 mg, nebulization, BID  ipratropium-albuteroL, 3 mL, nebulization, BID  mycophenolate, 1,000 mg, oral, BID  pantoprazole, 40 mg, oral, Daily  predniSONE, 15 mg, oral, Daily      Continuous medications     PRN medications  PRN medications: acetaminophen, dextrose 10 % in water (D10W), dextrose, glucagon, ipratropium-albuteroL     Physical Exam  Constitutional: Well developed, awake/alert/oriented x4, no distress, alert and cooperative  Skin: Warm and dry, no lesions, no rashes  Eyes: Anicteric, clear sclera  ENMT: mucous membranes moist, no apparent injury, no lesions seen  Head/Neck: Atraumatic  Respiratory: Lungs clear to auscultation bilaterally with no wheezes, rhonci or crackles  CV: Regular rate and rhythm, no murmurs or gallops auscultated  GI: Non-tender to deep palpation, no guarding  MSK: no joint swelling  Extremities: normal extremities, no cyanosis edema, contusions or wounds, no clubbing  Psych: Appropriate mood and behavior     Relevant Results  Results for orders  placed or performed during the hospital encounter of 11/20/23 (from the past 24 hour(s))   POCT GLUCOSE   Result Value Ref Range    POCT Glucose 109 (H) 74 - 99 mg/dL   POCT GLUCOSE   Result Value Ref Range    POCT Glucose 99 74 - 99 mg/dL   CBC   Result Value Ref Range    WBC 4.3 (L) 4.4 - 11.3 x10*3/uL    nRBC 0.9 (H) 0.0 - 0.0 /100 WBCs    RBC 2.59 (L) 4.00 - 5.20 x10*6/uL    Hemoglobin 7.7 (L) 12.0 - 16.0 g/dL    Hematocrit 26.2 (L) 36.0 - 46.0 %     (H) 80 - 100 fL    MCH 29.7 26.0 - 34.0 pg    MCHC 29.4 (L) 32.0 - 36.0 g/dL    RDW 21.2 (H) 11.5 - 14.5 %    Platelets 104 (L) 150 - 450 x10*3/uL   Renal function panel   Result Value Ref Range    Glucose 74 74 - 99 mg/dL    Sodium 145 136 - 145 mmol/L    Potassium 5.1 3.5 - 5.3 mmol/L    Chloride 114 (H) 98 - 107 mmol/L    Bicarbonate 25 21 - 32 mmol/L    Anion Gap 11 10 - 20 mmol/L    Urea Nitrogen 36 (H) 6 - 23 mg/dL    Creatinine 1.93 (H) 0.50 - 1.05 mg/dL    eGFR 29 (L) >60 mL/min/1.73m*2    Calcium 8.6 8.6 - 10.3 mg/dL    Phosphorus 3.3 2.5 - 4.9 mg/dL    Albumin 3.0 (L) 3.4 - 5.0 g/dL   POCT GLUCOSE   Result Value Ref Range    POCT Glucose 59 (L) 74 - 99 mg/dL   POCT GLUCOSE   Result Value Ref Range    POCT Glucose 50 (L) 74 - 99 mg/dL   POCT GLUCOSE   Result Value Ref Range    POCT Glucose 111 (H) 74 - 99 mg/dL        Image Results  CT abdomen pelvis wo IV contrast  Narrative: Interpreted By:  Deacon Munoz,   STUDY:  CT ABDOMEN PELVIS WO IV CONTRAST;  11/20/2023 10:25 pm      INDICATION:  Signs/Symptoms:epigastric/periumbilical abd pain w/ diarrhea.      COMPARISON:  8/28/2023      ACCESSION NUMBER(S):  [id number]      ORDERING CLINICIAN:  ARELIS VILLAGRAN      TECHNIQUE:  Contiguous axial images of the abdomen and pelvis were obtained  without intravenous contrast. Coronal and sagittal reformatted images  were obtained from the axial images.      FINDINGS:  There is limited evaluation of the lung bases.  Mild basilar subsegmental atelectasis.       Cardiomegaly and coronary artery atherosclerotic calcifications.      Evaluation of the abdominal viscera is limited secondary to lack of  intravenous contrast. Limited evaluation for liver mass on  noncontrast examination. The gallbladder surgically absent.      The pancreas, spleen, and adrenal glands appear unremarkable.      Evaluation of the kidneys is limited secondary to lack of intravenous  contrast. 2 mm nonobstructive right renal calculus. No  hydronephrosis. Right renal vascular calcifications.      Atherosclerotic calcification of the abdominal aorta and bilateral  iliac arteries.      No evidence of bowel obstruction or acute appendicitis. There is  colonic diverticulosis without evidence of acute diverticulitis.  There is underdistention versus wall thickening of the ascending  colon. The colon is diffusely underdistended and not well evaluated.      Limited evaluation of the urinary bladder is underdistention and beam  hardening artifact.      Postsurgical change of right hip arthroplasty resulting in beam  hardening artifact and limiting evaluation of the pelvis. There is  stable advanced left hip osteoarthrosis with joint space loss and  subchondral sclerosis and cystic change and femoral head neck  junction osseous spurring.      Multilevel degenerative change of the lumbar spine. There is stable  multilevel degenerative endplate change. Stable mild compression  deformity of L1.      Impression: No evidence of bowel obstruction or acute appendicitis. Colon is  diffusely underdistended and not well evaluated. There is  underdistention versus wall thickening of the ascending colon which  may be infectious or inflammatory in etiology. Colonic diverticulosis  without evidence of acute diverticulitis.      Limited evaluation of the urinary bladder secondary to  underdistention and beam hardening artifact. Underdistention versus  mild urinary bladder wall thickening; please correlate with  urinalysis  to exclude cystitis.      MACRO:  None      Signed by: Deacon Munoz 11/20/2023 11:26 PM  Dictation workstation:   [id number]  XR chest 1 view  Narrative: STUDY:  Chest Radiograph;  11/20/2023 3:08 PM.  INDICATION:  Weakness.  COMPARISON:  XR chest 10/19/2023.  ACCESSION NUMBER(S):  [id number]  ORDERING CLINICIAN:  BUSHRA NGUYEN  TECHNIQUE:  Frontal chest was obtained at 15:07 hours.  FINDINGS:  CARDIOMEDIASTINAL SILHOUETTE:  Cardiomediastinal silhouette is mildly enlarged in size. The aorta is  calcified and tortuous.     LUNGS:  Lungs are mildly hypoinflated, but appear clear.     ABDOMEN:  No remarkable upper abdominal findings.     BONES:  No acute osseous changes.  Impression: Stable mild enlargement of the cardiac silhouette. Mildly hypoinflated  lungs which appear clear. Calcified and tortuous aorta.   Signed by Alan Linton MD           Assessment/Plan   62-year-old female presenting for hypoglycemia and recurrent diarrhea.  Found to be norovirus positive, symptomatic management with resolution of symptoms on 11/23.  Evaluated by endocrinology due to recurrent history of hypoglycemia, suspect secondary to secondary adrenal insufficiency, for which her fludrocortisone was discontinued and prednisone adjusted to 10 mg daily.  Evaluated by PT/OT, and pending precertification to SNF for discharge.    # Secondary adrenal insufficiency  # SLE on CellCept/prednisone  -Prednisone dosage increased from 10 mg daily to 15 mg daily due to episode of hypoglycemia today.  Consider addition of Bactrim for PJP prophylaxis  -Endocrinology consulted, appreciate recommendations. Educated patient on taking further stress dosing if hypotensive or hypoglycemic at home    # Norovirus infection  -Symptoms resolved 11/23, if remains asymptomatic for 48 hours, can remove precautions on 11/25    # JED  -Creatinine today 1.91, with known baseline of around 1.6.  Expect further improvement as her diarrhea has resolved today, will  continue to monitor    # Pancytopenia  -CBC appears to be at baseline, bone marrow biopsy from 2020 suggesting MDS.  No evidence of bleeding, no blast noted on differential     # Asymptomatic bacteriuria  -Patient is largely asymptomatic, given history of prior C. difficile infection will elect to not continue treatment    DVT prophylaxis: Eliquis  Consults: Endocrinology    Dispo: Medically clear for discharge, pending precertification to SNF.       To be staffed with attending,  López Pyle, DO  Internal Medicine PGY-3

## 2023-11-24 NOTE — PROGRESS NOTES
"    Endocrinology Inpatient Consult Progress Note     PATIENT NAME: Bing Holliday  MRN: [id number]  DATE: 11/24/2023    CONSULTING PHYSICIAN: Dr. Wren  REASON FOR CONSULT: AI.  T2DM.      Interval Events     No acute events overnight.  Patient states that she feels much better.  She is having formed stools now.  No nausea or vomiting.  Was sitting in the chair reading a book this morning.  Denies any lightheadedness with ambulation.      Physical Examination     /58   Pulse (!) 47   Temp 35.5 °C (95.9 °F) (Temporal)   Resp 16   Ht 1.803 m (5' 11\")   Wt 94.8 kg (208 lb 15.9 oz)   LMP  (LMP Unknown)   SpO2 96%   BMI 29.15 kg/m²   No acute distress.  Temporal wasting.  Regular rate and rhythm.  Nonlabored respiration.  Soft nontender nondistended abdomen.  No pedal edema bilaterally.  Appropriate affect.      Medications     Reviewed MAR       Data     Recent Labs and Imaging Reviewed      Assessment / Plan        # Hypoglycemia  Likely due to hypoadrenalism and possible glucocorticoid noncompliance.  No recurrence since admission.   Cont glucocorticoids.     # Uncontrolled Type 2 Diabetes Mellitus  Home Regimen: None.  Hemoglobin A1c (7/23): 6.9%  Nutrtion: Regular Diet.     Continue to hold all therapies given problem.  She likely will have hyperglycemia when she takes steroids and has heavy CHO meal.   Nonetheless, it is too risky to put her on hypoglycemic agents as she may have profound hypoglycemia if she forgets to take her prednisone.     # Secondary Adrenal Insufficiency  Please see my initial consult note for my clinical formulation.  Change to prednisone 10mg in the AM.  Explained sick day rules.   Explained need for medical alert bracelet.  Discontinue fludrocortisone (this is only needed for primary AI).  Perhaps her glucocorticoids could be weaned, but this should be considered by her outpatient endocrinologist, Dr. Chi. May be a difficult feat.     # Osteoporosis  In the setting of " chronic glucocorticoid use.  This will be further managed as an outpatient.  She probably would benefit from a bisphosphonate.         Avi Sloan, DO  Endocrinology, Diabetes, and Metabolism    Available via [employer] Messenger    Please excuse and typographical or unwanted errors within this documentation as voice recognition software was used to dictate this note.

## 2023-11-24 NOTE — CONSULTS
Nutrition Assessment Note  Nutrition Assessment  Initial    Reason for Assessment  Reason for Assessment: Provider consult order  Nutrition Note:  Bing Holliday is a 63 y.o. female presenting from home for hypoglycemia. Pt currently on precautions for Norovirus. Diarrhea now resolved. Assessment completed over the phone. Endocrinology on consult. Per chart notes, pt w/hypoglycemia d/t hypoadrenalism possible glucocorticoid noncompliance. Pt continues on steroids and regular diet. Meal intakes % per meal documentation.     Past Medical History   has a past medical history of Acute upper respiratory infection, unspecified (03/04/2020), Acute upper respiratory infection, unspecified (09/30/2015), Arthritis, Body mass index (BMI) 23.0-23.9, adult (10/15/2021), Body mass index (BMI) 33.0-33.9, adult (03/04/2020), Cardiomegaly (08/27/2013), Chronic kidney disease, stage 3 unspecified (CMS/[facility]) (07/02/2013), Disease of pericardium, unspecified (07/02/2013), Encounter for follow-up examination after completed treatment for conditions other than malignant neoplasm (10/06/2022), Generalized contraction of visual field, right eye (01/29/2015), Homonymous bilateral field defects, right side (04/29/2016), Hypertensive chronic kidney disease with stage 1 through stage 4 chronic kidney disease, or unspecified chronic kidney disease (07/02/2013), Laceration without foreign body, left foot, initial encounter (07/03/2018), Migraine with aura, not intractable, without status migrainosus (10/24/2022), Other conditions influencing health status (07/02/2013), Other conditions influencing health status (07/02/2013), Other conditions influencing health status (07/02/2013), Other conditions influencing health status (05/22/2015), Other conditions influencing health status (10/24/2022), Other conditions influencing health status (03/14/2022), Other long term (current) drug therapy (10/24/2022), Personal history of diseases of the  blood and blood-forming organs and certain disorders involving the immune mechanism (07/02/2013), Personal history of diseases of the skin and subcutaneous tissue (08/11/2015), Personal history of nephrotic syndrome (07/02/2013), Personal history of other diseases of the circulatory system (08/27/2013), Personal history of other diseases of the nervous system and sense organs, Personal history of other diseases of the respiratory system, Personal history of other infectious and parasitic diseases (07/02/2013), Personal history of other specified conditions, Personal history of other specified conditions (08/27/2013), Puckering of macula, right eye (10/24/2022), Raynaud's syndrome without gangrene (07/02/2013), Systemic lupus erythematosus, unspecified (CMS/[facility]) (07/24/2015), Systemic lupus erythematosus, unspecified (CMS/[facility]) (07/24/2015), Systemic lupus erythematosus, unspecified (CMS/[facility]) (07/24/2015), Type 2 diabetes mellitus with diabetic nephropathy (CMS/[facility]) (07/02/2013), Type 2 diabetes mellitus with mild nonproliferative diabetic retinopathy without macular edema, left eye (CMS/[facility]) (07/27/2015), Type 2 diabetes mellitus with mild nonproliferative diabetic retinopathy without macular edema, unspecified eye (CMS/[facility]) (07/24/2015), Type 2 diabetes mellitus with proliferative diabetic retinopathy without macular edema, right eye (CMS/[facility]) (07/27/2015), Type 2 diabetes mellitus with proliferative diabetic retinopathy without macular edema, unspecified eye (CMS/[facility]) (07/24/2015), Unspecified acute and subacute iridocyclitis (07/24/2015), and Unspecified open wound, left foot, sequela (07/03/2018).  Surgical History   has a past surgical history that includes Eye surgery (03/06/2015); Total hip arthroplasty (01/29/2015); Cholecystectomy (01/29/2015); Other surgical history (01/29/2015); Other surgical history (01/29/2015); Ankle surgery (01/29/2015); Foot surgery (01/29/2015); MR angio head wo IV contrast  "(7/26/2013); MR angio neck wo IV contrast (7/26/2013); CT guided percutaneous biopsy bone deep (5/4/2021); MR angio head wo IV contrast (9/17/2021); MR angio neck wo IV contrast (9/17/2021); MR angio neck wo IV contrast (3/25/2023); and MR angio head wo IV contrast (3/25/2023).    History:  Food and Nutrient History  Energy Intake: Fair 50-75 %  Food and Nutrient History: Pt with regular diet PTA. Pt does not count carbs. Pt recently got a Dexcom (currently fell off in hospital), but was still having hypoglycemia PTA. Pt reports no prior visits with RD as outpatient. Pt typically ate 3 meals a day and either had a late dinner or hs snack.  Vitamin/Herbal Supplement Use: liks vanilla Glucerna, but was not drinking regularly PTA       Meal documentation: 11/23 100%B, 75%L; 11/22 100%B and D; 11/21 100%B and L; 50%D; 11/20 75%D.      Current Diet: Adult diet Regular  Food allergies: NKFA. is allergic to ace inhibitors, hydroxychloroquine, lisinopril, penicillins, and sulfa (sulfonamide antibiotics).    GI assessment: Pt reports diarrhea. Now resolved. Last BM 11/21.   Oral Problems:  Mobility:    Pain Assessment: 0-10  Pain Score: 0 - No pain    Vitals: Blood pressure 123/81, pulse 50, temperature 36 °C (96.8 °F), temperature source Temporal, resp. rate 16, height 1.803 m (5' 11\"), weight 94.8 kg (208 lb 15.9 oz), SpO2 99 %.    Recent Lab Results:  Lab Results   Component Value Date    GLUCOSE 74 11/24/2023    CALCIUM 8.6 11/24/2023     11/24/2023    K 5.1 11/24/2023    CO2 25 11/24/2023     (H) 11/24/2023    BUN 36 (H) 11/24/2023    CREATININE 1.93 (H) 11/24/2023     Lab Results   Component Value Date    HGBA1C 6.9 (A) 07/12/2023    HGBA1C 6.3 (A) 03/22/2023     Results from last 7 days   Lab Units 11/24/23  1308 11/24/23  0942 11/24/23  0851 11/24/23  0824 11/24/23  0412 11/23/23  1633 11/23/23  0749 11/23/23  0401   POCT GLUCOSE mg/dL 101* 111* 50* 59* 99 109* 97 85     Medications reviewed:  apixaban, " 2.5 mg, oral, BID  budesonide, 0.25 mg, nebulization, BID  ipratropium-albuteroL, 3 mL, nebulization, BID  mycophenolate, 1,000 mg, oral, BID  pantoprazole, 40 mg, oral, Daily  predniSONE, 15 mg, oral, Daily       Height: 180.3cm  BMI: 29.15  Admission Weight: 94.6 kg (208 lb 8.9 oz)   Weight history/ % weight change:   Significant Weight Loss:  Interpretation of Weight Loss:   Per archives: 11/17/22 188#, 12/29/22 202#, 3/29/23 90.6kg, 5/28/23 206.5#, 10/20/23 208#.       Energy Needs:  Calculated Energy Needs Using Equations  Temp: 36 °C (96.8 °F)    Estimated Energy Needs  Total Energy Estimated Needs (kCal):  (8444-1752)  Total Estimated Energy Need per Day (kCal/kg):  (18-20)    Estimated Protein Needs  Total Protein Estimated Needs (g):  (80-96)  Total Protein Estimated Needs (g/kg):  (1.0-1.2g/kg adjusted body wt.)    Estimated Fluid Needs  Method for Estimating Needs: 1mL/kcal     Nutrition Focused Physical Findings:  Subcutaneous Fat Loss  Orbital Fat Pads:  (NFPE deferred d/t precautions for Norovirus.)     Edema  Edema: +2 mild  Edema Location: generalized, BLLE     Physical Findings (Nutrition Deficiency/Toxicity)  Skin:  (L dorsal foot wound,)     Nutrition Diagnosis   Malnutrition Diagnosis  Patient has Malnutrition Diagnosis: No    Patient has Nutrition Diagnosis: Yes  Nutrition Diagnosis 1: Food and nutrition related knowledge deficit  Diagnosis Status (1): New  Related to (1): diabetes management  As Evidenced by (1): admission with hypoglycemia and report of limited carb counting.                Nutrition Interventions/Recommendations   Nutrition Prescription  Individualized Nutrition Prescription Provided for : Diet: continue w/regular diet as ordered. Pt aware of ability to ask diet clerks for amount of carbs in meal ordered and aware of written # of carbs on menu.    Food and/or Nutrient Delivery Interventions  Interventions: Meals and snacks, Medical food supplement    Meals and Snacks: General  "healthful diet  Goal: Will consume >75% of meals.      Medical Food Supplement: Commercial beverage  Goal: Will provide Glucerna (vanilla) Therapeutic Nutrition Shake at 8pm daily (220kcal, 10g pro per 8oz serving).           Coordination of Nutrition Care by a Nutrition Professional  Goal: Provided outpatient MNT information.    Education Documentation  Nutrition Care Manual, taught by Veronica Burnett RD at 11/24/2023  1:47 PM.  Learner: Patient  Readiness: Acceptance  Method: Explanation, Handout  Response: Needs Reinforcement  Comment: Provided Diabetes handouts: \"A Balanced Plate\" by [employer] and \"Healthy Snacks for Diabetes\" by [employer].       Nutrition Monitoring and Evaluation   Food and Nutrient Related History      Amount of Food: Estimated amout of food, Medical food intake  Criteria: pt will meet >75% of estimated needs. Consume provided supplement.             Biochemical Data, Medical Tests and Procedures      Glucose/Endocrine Profile: Glucose, casual  Criteria: glucose values 80-180mg/dL.       Follow Up  Time Spent (min): 60 minutes  Follow up: Provided information on outpatient nutrition therapy services  Last Date of Nutrition Visit: 11/24/23  Nutrition Follow-Up Needed?: 3-8 days  Follow up Comment: RAMON     "

## 2023-11-24 NOTE — PROGRESS NOTES
Spiritual Care Visit    Clinical Encounter Type  Visited With: Patient  Routine Visit: Introduction  Continue Visiting: No                                            Taxonomy  Intended Effects: Build relationship of care and support, Convey a calming presence, Preserve dignity and respect, Promote sense of peace, Helping someone feel comforted  Methods: Offer spiritual/Hoahaoism support  Interventions: Share words of hope and inspiration     spoke to the patient from the hallway as she had contact precautions.  Patient recognized the  from previous visits and smiled.   wished her a happy Birthday and commented on the decorations for her birthday around her room.   was a supportive presence.

## 2023-11-24 NOTE — NURSING NOTE
No acute changes noted. No complaints of pain. Pt appears to be resting. Even rise and fall of chest noted. No signs of distress. Meds given as ordered. No IV access. 0400 VS refused to allow the pt adequate rest for over all well being.

## 2023-11-25 LAB
ANION GAP SERPL CALC-SCNC: 10 MMOL/L (ref 10–20)
BUN SERPL-MCNC: 40 MG/DL (ref 6–23)
CALCIUM SERPL-MCNC: 8.7 MG/DL (ref 8.6–10.3)
CHLORIDE SERPL-SCNC: 112 MMOL/L (ref 98–107)
CO2 SERPL-SCNC: 26 MMOL/L (ref 21–32)
CREAT SERPL-MCNC: 1.97 MG/DL (ref 0.5–1.05)
ERYTHROCYTE [DISTWIDTH] IN BLOOD BY AUTOMATED COUNT: 21.1 % (ref 11.5–14.5)
GFR SERPL CREATININE-BSD FRML MDRD: 28 ML/MIN/1.73M*2
GLUCOSE BLD MANUAL STRIP-MCNC: 105 MG/DL (ref 74–99)
GLUCOSE BLD MANUAL STRIP-MCNC: 107 MG/DL (ref 74–99)
GLUCOSE BLD MANUAL STRIP-MCNC: 128 MG/DL (ref 74–99)
GLUCOSE BLD MANUAL STRIP-MCNC: 190 MG/DL (ref 74–99)
GLUCOSE BLD MANUAL STRIP-MCNC: 51 MG/DL (ref 74–99)
GLUCOSE BLD MANUAL STRIP-MCNC: 95 MG/DL (ref 74–99)
GLUCOSE SERPL-MCNC: 66 MG/DL (ref 74–99)
HCT VFR BLD AUTO: 29 % (ref 36–46)
HGB BLD-MCNC: 8.6 G/DL (ref 12–16)
MCH RBC QN AUTO: 29.7 PG (ref 26–34)
MCHC RBC AUTO-ENTMCNC: 29.7 G/DL (ref 32–36)
MCV RBC AUTO: 100 FL (ref 80–100)
NRBC BLD-RTO: 1.3 /100 WBCS (ref 0–0)
PLATELET # BLD AUTO: 118 X10*3/UL (ref 150–450)
POTASSIUM SERPL-SCNC: 3.9 MMOL/L (ref 3.5–5.3)
RBC # BLD AUTO: 2.9 X10*6/UL (ref 4–5.2)
SODIUM SERPL-SCNC: 144 MMOL/L (ref 136–145)
WBC # BLD AUTO: 6.1 X10*3/UL (ref 4.4–11.3)

## 2023-11-25 PROCEDURE — 2500000004 HC RX 250 GENERAL PHARMACY W/ HCPCS (ALT 636 FOR OP/ED): Performed by: STUDENT IN AN ORGANIZED HEALTH CARE EDUCATION/TRAINING PROGRAM

## 2023-11-25 PROCEDURE — 2500000004 HC RX 250 GENERAL PHARMACY W/ HCPCS (ALT 636 FOR OP/ED): Mod: JZ

## 2023-11-25 PROCEDURE — 36415 COLL VENOUS BLD VENIPUNCTURE: CPT | Performed by: STUDENT IN AN ORGANIZED HEALTH CARE EDUCATION/TRAINING PROGRAM

## 2023-11-25 PROCEDURE — 2500000001 HC RX 250 WO HCPCS SELF ADMINISTERED DRUGS (ALT 637 FOR MEDICARE OP)

## 2023-11-25 PROCEDURE — 82947 ASSAY GLUCOSE BLOOD QUANT: CPT

## 2023-11-25 PROCEDURE — 1100000001 HC PRIVATE ROOM DAILY

## 2023-11-25 PROCEDURE — 99233 SBSQ HOSP IP/OBS HIGH 50: CPT | Performed by: STUDENT IN AN ORGANIZED HEALTH CARE EDUCATION/TRAINING PROGRAM

## 2023-11-25 PROCEDURE — 85027 COMPLETE CBC AUTOMATED: CPT | Performed by: STUDENT IN AN ORGANIZED HEALTH CARE EDUCATION/TRAINING PROGRAM

## 2023-11-25 PROCEDURE — 80048 BASIC METABOLIC PNL TOTAL CA: CPT | Performed by: STUDENT IN AN ORGANIZED HEALTH CARE EDUCATION/TRAINING PROGRAM

## 2023-11-25 PROCEDURE — 96372 THER/PROPH/DIAG INJ SC/IM: CPT

## 2023-11-25 RX ORDER — BUDESONIDE 0.25 MG/2ML
0.25 INHALANT ORAL 2 TIMES DAILY PRN
Status: DISCONTINUED | OUTPATIENT
Start: 2023-11-25 | End: 2023-11-27 | Stop reason: HOSPADM

## 2023-11-25 RX ORDER — RISPERIDONE 1 MG/ML
0.5 SOLUTION ORAL ONCE
Status: COMPLETED | OUTPATIENT
Start: 2023-11-25 | End: 2023-11-25

## 2023-11-25 RX ADMIN — PREDNISONE 15 MG: 5 TABLET ORAL at 11:04

## 2023-11-25 RX ADMIN — PANTOPRAZOLE SODIUM 40 MG: 40 TABLET, DELAYED RELEASE ORAL at 09:55

## 2023-11-25 RX ADMIN — APIXABAN 2.5 MG: 2.5 TABLET, FILM COATED ORAL at 21:42

## 2023-11-25 RX ADMIN — GLUCAGON 1 MG: KIT at 07:51

## 2023-11-25 RX ADMIN — MYCOPHENOLATE MOFETIL 1000 MG: 250 CAPSULE ORAL at 21:42

## 2023-11-25 RX ADMIN — RISPERIDONE 0.5 MG: 1 SOLUTION ORAL at 22:00

## 2023-11-25 RX ADMIN — MYCOPHENOLATE MOFETIL 1000 MG: 250 CAPSULE ORAL at 09:55

## 2023-11-25 RX ADMIN — APIXABAN 2.5 MG: 2.5 TABLET, FILM COATED ORAL at 09:55

## 2023-11-25 ASSESSMENT — COGNITIVE AND FUNCTIONAL STATUS - GENERAL
MOBILITY SCORE: 18
WALKING IN HOSPITAL ROOM: A LITTLE
PERSONAL GROOMING: A LITTLE
TOILETING: A LITTLE
STANDING UP FROM CHAIR USING ARMS: A LITTLE
HELP NEEDED FOR BATHING: A LITTLE
MOVING TO AND FROM BED TO CHAIR: A LITTLE
DRESSING REGULAR LOWER BODY CLOTHING: A LITTLE
TURNING FROM BACK TO SIDE WHILE IN FLAT BAD: A LITTLE
DAILY ACTIVITIY SCORE: 19
DRESSING REGULAR UPPER BODY CLOTHING: A LITTLE
CLIMB 3 TO 5 STEPS WITH RAILING: A LOT

## 2023-11-25 ASSESSMENT — PAIN SCALES - GENERAL: PAINLEVEL_OUTOF10: 0 - NO PAIN

## 2023-11-25 NOTE — PROGRESS NOTES
Bing Holliday is a 63 y.o. female on day 5 of admission presenting with Hypoglycemia.      Subjective   No acute events overnight.  Patient was hypoglycemic this morning with a blood sugar of 51 and received glucagon.  She was asymptomatic without lightheadedness, headache, blurred vision, dizziness, shortness of breath or chest pain.       Objective     Last Recorded Vitals  /74   Pulse 60   Temp 36.1 °C (97 °F)   Resp 16   Wt 94.8 kg (208 lb 15.9 oz)   SpO2 100%   Intake/Output last 3 Shifts:    Intake/Output Summary (Last 24 hours) at 11/25/2023 1203  Last data filed at 11/25/2023 1000  Gross per 24 hour   Intake 240 ml   Output 300 ml   Net -60 ml         Admission Weight  Weight: 94.6 kg (208 lb 8.9 oz) (11/20/23 1246)    Daily Weight  11/20/23 : 94.8 kg (208 lb 15.9 oz)      Scheduled medications  apixaban, 2.5 mg, oral, BID  budesonide, 0.25 mg, nebulization, BID  ipratropium-albuteroL, 3 mL, nebulization, BID  mycophenolate, 1,000 mg, oral, BID  pantoprazole, 40 mg, oral, Daily  predniSONE, 15 mg, oral, Daily      Continuous medications     PRN medications  PRN medications: acetaminophen, dextrose 10 % in water (D10W), dextrose, glucagon, ipratropium-albuteroL     Physical Exam  Constitutional: Well developed, awake/alert/oriented x4, no distress, alert and cooperative  Skin: Warm and dry, no lesions, no rashes  Eyes: Anicteric, clear sclera  ENMT: mucous membranes moist, no apparent injury, no lesions seen  Head/Neck: Atraumatic  Respiratory: Lungs clear to auscultation bilaterally with no wheezes, rhonci or crackles  CV: Regular rate and rhythm, no murmurs or gallops auscultated  GI: Non-tender to deep palpation, no guarding  MSK: no joint swelling  Extremities: normal extremities, no cyanosis edema, contusions or wounds, no clubbing  Psych: Appropriate mood and behavior     Relevant Results  Results for orders placed or performed during the hospital encounter of 11/20/23 (from the past 24  hour(s))   POCT GLUCOSE   Result Value Ref Range    POCT Glucose 101 (H) 74 - 99 mg/dL   POCT GLUCOSE   Result Value Ref Range    POCT Glucose 154 (H) 74 - 99 mg/dL   POCT GLUCOSE   Result Value Ref Range    POCT Glucose 139 (H) 74 - 99 mg/dL   POCT GLUCOSE   Result Value Ref Range    POCT Glucose 51 (L) 74 - 99 mg/dL   POCT GLUCOSE   Result Value Ref Range    POCT Glucose 95 74 - 99 mg/dL   POCT GLUCOSE   Result Value Ref Range    POCT Glucose 105 (H) 74 - 99 mg/dL        Image Results  CT abdomen pelvis wo IV contrast  Narrative: Interpreted By:  Deacon Munoz,   STUDY:  CT ABDOMEN PELVIS WO IV CONTRAST;  11/20/2023 10:25 pm      INDICATION:  Signs/Symptoms:epigastric/periumbilical abd pain w/ diarrhea.      COMPARISON:  8/28/2023      ACCESSION NUMBER(S):  [id number]      ORDERING CLINICIAN:  ARELIS VILLAGRAN      TECHNIQUE:  Contiguous axial images of the abdomen and pelvis were obtained  without intravenous contrast. Coronal and sagittal reformatted images  were obtained from the axial images.      FINDINGS:  There is limited evaluation of the lung bases.  Mild basilar subsegmental atelectasis.      Cardiomegaly and coronary artery atherosclerotic calcifications.      Evaluation of the abdominal viscera is limited secondary to lack of  intravenous contrast. Limited evaluation for liver mass on  noncontrast examination. The gallbladder surgically absent.      The pancreas, spleen, and adrenal glands appear unremarkable.      Evaluation of the kidneys is limited secondary to lack of intravenous  contrast. 2 mm nonobstructive right renal calculus. No  hydronephrosis. Right renal vascular calcifications.      Atherosclerotic calcification of the abdominal aorta and bilateral  iliac arteries.      No evidence of bowel obstruction or acute appendicitis. There is  colonic diverticulosis without evidence of acute diverticulitis.  There is underdistention versus wall thickening of the ascending  colon. The colon is  diffusely underdistended and not well evaluated.      Limited evaluation of the urinary bladder is underdistention and beam  hardening artifact.      Postsurgical change of right hip arthroplasty resulting in beam  hardening artifact and limiting evaluation of the pelvis. There is  stable advanced left hip osteoarthrosis with joint space loss and  subchondral sclerosis and cystic change and femoral head neck  junction osseous spurring.      Multilevel degenerative change of the lumbar spine. There is stable  multilevel degenerative endplate change. Stable mild compression  deformity of L1.      Impression: No evidence of bowel obstruction or acute appendicitis. Colon is  diffusely underdistended and not well evaluated. There is  underdistention versus wall thickening of the ascending colon which  may be infectious or inflammatory in etiology. Colonic diverticulosis  without evidence of acute diverticulitis.      Limited evaluation of the urinary bladder secondary to  underdistention and beam hardening artifact. Underdistention versus  mild urinary bladder wall thickening; please correlate with  urinalysis to exclude cystitis.      MACRO:  None      Signed by: Deacon Munoz 11/20/2023 11:26 PM  Dictation workstation:   [id number]  XR chest 1 view  Narrative: STUDY:  Chest Radiograph;  11/20/2023 3:08 PM.  INDICATION:  Weakness.  COMPARISON:  XR chest 10/19/2023.  ACCESSION NUMBER(S):  [id number]  ORDERING CLINICIAN:  BUSHRA NGUYEN  TECHNIQUE:  Frontal chest was obtained at 15:07 hours.  FINDINGS:  CARDIOMEDIASTINAL SILHOUETTE:  Cardiomediastinal silhouette is mildly enlarged in size. The aorta is  calcified and tortuous.     LUNGS:  Lungs are mildly hypoinflated, but appear clear.     ABDOMEN:  No remarkable upper abdominal findings.     BONES:  No acute osseous changes.  Impression: Stable mild enlargement of the cardiac silhouette. Mildly hypoinflated  lungs which appear clear. Calcified and tortuous aorta.    Signed by Alan Linton MD           Assessment/Plan   62-year-old female presenting for hypoglycemia and recurrent diarrhea.  Found to be norovirus positive, symptomatic management with resolution of symptoms on 11/23.  Evaluated by endocrinology due to recurrent history of hypoglycemia, suspect secondary to secondary adrenal insufficiency, for which her fludrocortisone was discontinued and prednisone adjusted to 10 mg daily. Her continued asymptomatic hypoglycemic events were brought up to endocrinology who suggested maybe with patient's raynouds that finger glucose may be inaccurate and should attempt a repeat BS through ear. Evaluated by PT/OT, and pending precertification to SNF for discharge.    # Secondary adrenal insufficiency  # SLE on CellCept/prednisone  -Prednisone dosage increased from 10 mg daily to 15 mg daily due to episode of hypoglycemia for stress dosing.  Consider addition of Bactrim for PJP prophylaxis  -Endocrinology consulted, appreciate recommendations. Educated patient on taking further stress dosing if hypotensive or hypoglycemic at home    # Norovirus infection, resolved  -Symptoms resolved 11/23, if remains asymptomatic for 48 hours  -Discontinue precautions     # JED  -Creatinine today 1.91, with known baseline of around 1.6.   -Continue to trend cmp    # Pancytopenia  -CBC appears to be at baseline, bone marrow biopsy from 2020 suggesting MDS.  No evidence of bleeding, no blast noted on differential     # Asymptomatic bacteriuria  -Patient is largely asymptomatic, given history of prior C. difficile infection will elect to not continue treatment    DVT prophylaxis: Eliquis  Consults: Endocrinology    Dispo: Medically clear for discharge, pending precertification to SNF.       Reviewed case with senior resident and attending physician,  Lauri Silverio D.O.  Internal Medicine, PGY-1  Thank you!

## 2023-11-25 NOTE — PROGRESS NOTES
"    Endocrinology Inpatient Consult Progress Note     PATIENT NAME: Bing Holliday  MRN: [id number]  DATE: 11/25/2023    CONSULTING PHYSICIAN: Dr. Wren  REASON FOR CONSULT: AI.  T2DM.      Interval Events     Hypoglycemia yesterday AM.   Unclear if symptoms.  I saw the patient circa 7:30 on 11/24 and she was compos mentis.   She had no symptoms.    Patient seen in the evening today.   She did not she had a low sugar this AM.   She states she had no symptoms.   She had no idea she was low.   She states historically she does not get symptoms with low sugars.  She feels fine this evening.   She was eating dinner.      Physical Examination     /74 (BP Location: Left arm, Patient Position: Sitting)   Pulse (!) 48   Temp 35.6 °C (96.1 °F) (Temporal)   Resp 16   Ht 1.803 m (5' 11\")   Wt 94.8 kg (208 lb 15.9 oz)   LMP  (LMP Unknown)   SpO2 97%   BMI 29.15 kg/m²   No acute distress.  Temporal wasting.  Regular rate and rhythm.  Nonlabored respiration.  Soft nontender nondistended abdomen.  No pedal edema bilaterally.  Appropriate affect.      Medications     Reviewed MAR       Data     Recent Labs and Imaging Reviewed      Assessment / Plan        # Hypoglycemia  Had BS < 60 mg/dL on POC glucose in the AM 11/24.   Interestingly, on her RFP that same morning, BS > 70.  I spoke to the patient around the time she had her glucose checked and she had no complaints to me.     My thoughts are as follow:   1) Query if her POC glucose are accurate? Patient does have SLE. Does she have a element of Raynauds as well? Nonetheless, I do not disagree with increasing her glucocorticoids dose.     2) If her POC glucose is accurate, then she had a normal BS on RFP. It was not till later that she had the low. I looked at the MAR. Patient is receiving prednisone at 9AM. This is not physiologic. Patient was awake in the chair at 7AM when I saw her. She should get her prednisone at 0700 then. I will retime this. I explained " that she should take it as soon as she awakens. If this does not help, perhaps we can add a afternoon dose of prednisone. Prednisone is a 24 hour glucocorticoid theoretically but perhaps she is a hypermetabolizer.    If she does have a low sugar (ie. < 60mg/dL) again, I favor not treating, assessing for symptoms, and obtaining POC glucose from alternate site to confirm. We could then get a chemistry, c peptide, and BHB to make sure this is not insulin dependent hypoglycemia.     # Uncontrolled Type 2 Diabetes Mellitus  Home Regimen: None.  Hemoglobin A1c (7/23): 6.9%  Nutrtion: Regular Diet.     Continue to hold all therapies given problem.  She likely will have hyperglycemia when she takes steroids and has heavy CHO meal.   Nonetheless, it is too risky to put her on hypoglycemic agents as she may have profound hypoglycemia if she forgets to take her prednisone.     # Secondary Adrenal Insufficiency  Please see my initial consult note for my clinical formulation.  Change to prednisone 15mg in the AM.  Explained sick day rules.   Explained need for medical alert bracelet.  Discontinue fludrocortisone (this is only needed for primary AI).  Perhaps her glucocorticoids could be weaned, but this should be considered by her outpatient endocrinologist, Dr. Chi. May be a difficult feat.     # Osteoporosis  In the setting of chronic glucocorticoid use.  This will be further managed as an outpatient.  She probably would benefit from a bisphosphonate.         Avi Sloan, DO  Endocrinology, Diabetes, and Metabolism    Available via [employer] Messenger    Please excuse and typographical or unwanted errors within this documentation as voice recognition software was used to dictate this note.

## 2023-11-25 NOTE — NURSING NOTE
Patient alert and oriented x 4.  Patient calls for assist oob.  Call light within reach.  Patient assist with repositioning every 2 hrs.  Safety maintained

## 2023-11-25 NOTE — CARE PLAN
The patient's goals for the shift include  call for assistance this shift    The clinical goals for the shift include see care plan of care

## 2023-11-26 LAB
ALBUMIN SERPL BCP-MCNC: 3.2 G/DL (ref 3.4–5)
ALP SERPL-CCNC: 98 U/L (ref 33–136)
ALT SERPL W P-5'-P-CCNC: 20 U/L (ref 7–45)
ANION GAP SERPL CALC-SCNC: 12 MMOL/L (ref 10–20)
AST SERPL W P-5'-P-CCNC: 18 U/L (ref 9–39)
BILIRUB SERPL-MCNC: 0.2 MG/DL (ref 0–1.2)
BUN SERPL-MCNC: 48 MG/DL (ref 6–23)
CALCIUM SERPL-MCNC: 8.6 MG/DL (ref 8.6–10.3)
CHLORIDE SERPL-SCNC: 112 MMOL/L (ref 98–107)
CO2 SERPL-SCNC: 24 MMOL/L (ref 21–32)
CORTIS AM PEAK SERPL-MSCNC: 19 UG/DL (ref 5–20)
CREAT SERPL-MCNC: 1.88 MG/DL (ref 0.5–1.05)
ERYTHROCYTE [DISTWIDTH] IN BLOOD BY AUTOMATED COUNT: 21 % (ref 11.5–14.5)
GFR SERPL CREATININE-BSD FRML MDRD: 30 ML/MIN/1.73M*2
GLUCOSE BLD MANUAL STRIP-MCNC: 102 MG/DL (ref 74–99)
GLUCOSE BLD MANUAL STRIP-MCNC: 194 MG/DL (ref 74–99)
GLUCOSE BLD MANUAL STRIP-MCNC: 198 MG/DL (ref 74–99)
GLUCOSE BLD MANUAL STRIP-MCNC: 216 MG/DL (ref 74–99)
GLUCOSE BLD MANUAL STRIP-MCNC: 71 MG/DL (ref 74–99)
GLUCOSE SERPL-MCNC: 67 MG/DL (ref 74–99)
HCT VFR BLD AUTO: 29.3 % (ref 36–46)
HGB BLD-MCNC: 8.1 G/DL (ref 12–16)
MCH RBC QN AUTO: 29.1 PG (ref 26–34)
MCHC RBC AUTO-ENTMCNC: 27.6 G/DL (ref 32–36)
MCV RBC AUTO: 105 FL (ref 80–100)
NRBC BLD-RTO: 1.8 /100 WBCS (ref 0–0)
PLATELET # BLD AUTO: 104 X10*3/UL (ref 150–450)
POTASSIUM SERPL-SCNC: 4.6 MMOL/L (ref 3.5–5.3)
PROT SERPL-MCNC: 5.4 G/DL (ref 6.4–8.2)
RBC # BLD AUTO: 2.78 X10*6/UL (ref 4–5.2)
SODIUM SERPL-SCNC: 143 MMOL/L (ref 136–145)
WBC # BLD AUTO: 4.9 X10*3/UL (ref 4.4–11.3)

## 2023-11-26 PROCEDURE — 2500000001 HC RX 250 WO HCPCS SELF ADMINISTERED DRUGS (ALT 637 FOR MEDICARE OP)

## 2023-11-26 PROCEDURE — 85027 COMPLETE CBC AUTOMATED: CPT

## 2023-11-26 PROCEDURE — 2500000004 HC RX 250 GENERAL PHARMACY W/ HCPCS (ALT 636 FOR OP/ED): Performed by: INTERNAL MEDICINE

## 2023-11-26 PROCEDURE — 82533 TOTAL CORTISOL: CPT | Mod: STJLAB | Performed by: STUDENT IN AN ORGANIZED HEALTH CARE EDUCATION/TRAINING PROGRAM

## 2023-11-26 PROCEDURE — 1100000001 HC PRIVATE ROOM DAILY

## 2023-11-26 PROCEDURE — 99233 SBSQ HOSP IP/OBS HIGH 50: CPT | Performed by: STUDENT IN AN ORGANIZED HEALTH CARE EDUCATION/TRAINING PROGRAM

## 2023-11-26 PROCEDURE — 36415 COLL VENOUS BLD VENIPUNCTURE: CPT | Mod: STJLAB | Performed by: STUDENT IN AN ORGANIZED HEALTH CARE EDUCATION/TRAINING PROGRAM

## 2023-11-26 PROCEDURE — 84075 ASSAY ALKALINE PHOSPHATASE: CPT

## 2023-11-26 PROCEDURE — 82947 ASSAY GLUCOSE BLOOD QUANT: CPT

## 2023-11-26 PROCEDURE — 36415 COLL VENOUS BLD VENIPUNCTURE: CPT

## 2023-11-26 PROCEDURE — 2500000004 HC RX 250 GENERAL PHARMACY W/ HCPCS (ALT 636 FOR OP/ED): Performed by: STUDENT IN AN ORGANIZED HEALTH CARE EDUCATION/TRAINING PROGRAM

## 2023-11-26 PROCEDURE — 2500000004 HC RX 250 GENERAL PHARMACY W/ HCPCS (ALT 636 FOR OP/ED)

## 2023-11-26 RX ORDER — RISPERIDONE 1 MG/ML
0.5 SOLUTION ORAL ONCE
Status: COMPLETED | OUTPATIENT
Start: 2023-11-26 | End: 2023-11-26

## 2023-11-26 RX ORDER — PREDNISONE 10 MG/1
10 TABLET ORAL
Status: DISCONTINUED | OUTPATIENT
Start: 2023-11-27 | End: 2023-11-27 | Stop reason: HOSPADM

## 2023-11-26 RX ORDER — PREDNISONE 5 MG/1
5 TABLET ORAL
Status: DISCONTINUED | OUTPATIENT
Start: 2023-11-26 | End: 2023-11-27 | Stop reason: HOSPADM

## 2023-11-26 RX ADMIN — MYCOPHENOLATE MOFETIL 1000 MG: 250 CAPSULE ORAL at 09:23

## 2023-11-26 RX ADMIN — PANTOPRAZOLE SODIUM 40 MG: 40 TABLET, DELAYED RELEASE ORAL at 09:22

## 2023-11-26 RX ADMIN — PREDNISONE 15 MG: 5 TABLET ORAL at 06:26

## 2023-11-26 RX ADMIN — APIXABAN 2.5 MG: 2.5 TABLET, FILM COATED ORAL at 09:23

## 2023-11-26 RX ADMIN — APIXABAN 2.5 MG: 2.5 TABLET, FILM COATED ORAL at 22:04

## 2023-11-26 RX ADMIN — MYCOPHENOLATE MOFETIL 1000 MG: 250 CAPSULE ORAL at 22:05

## 2023-11-26 RX ADMIN — PREDNISONE 5 MG: 5 TABLET ORAL at 13:53

## 2023-11-26 RX ADMIN — RISPERIDONE 0.5 MG: 1 SOLUTION ORAL at 22:05

## 2023-11-26 ASSESSMENT — COGNITIVE AND FUNCTIONAL STATUS - GENERAL
STANDING UP FROM CHAIR USING ARMS: A LITTLE
DAILY ACTIVITIY SCORE: 19
MOBILITY SCORE: 18
DRESSING REGULAR UPPER BODY CLOTHING: A LITTLE
DRESSING REGULAR LOWER BODY CLOTHING: A LITTLE
TURNING FROM BACK TO SIDE WHILE IN FLAT BAD: A LITTLE
TOILETING: A LITTLE
PERSONAL GROOMING: A LITTLE
WALKING IN HOSPITAL ROOM: A LITTLE
CLIMB 3 TO 5 STEPS WITH RAILING: A LOT
MOVING TO AND FROM BED TO CHAIR: A LITTLE
HELP NEEDED FOR BATHING: A LITTLE

## 2023-11-26 ASSESSMENT — PAIN SCALES - GENERAL: PAINLEVEL_OUTOF10: 0 - NO PAIN

## 2023-11-26 ASSESSMENT — PAIN - FUNCTIONAL ASSESSMENT: PAIN_FUNCTIONAL_ASSESSMENT: 0-10

## 2023-11-26 NOTE — PROGRESS NOTES
Bing Holliday is a 63 y.o. female on day 6 of admission presenting with Hypoglycemia.      Subjective   No acute events overnight.  Patient was hypoglycemic this morning with a blood sugar of 71.  She was asymptomatic without lightheadedness, headache, blurred vision, dizziness, shortness of breath or chest pain.       Objective     Last Recorded Vitals  /67   Pulse 60   Temp 36.4 °C (97.5 °F)   Resp 16   Wt 94.8 kg (208 lb 15.9 oz)   SpO2 96%   Intake/Output last 3 Shifts:    Intake/Output Summary (Last 24 hours) at 11/26/2023 1045  Last data filed at 11/25/2023 1600  Gross per 24 hour   Intake 240 ml   Output --   Net 240 ml         Admission Weight  Weight: 94.6 kg (208 lb 8.9 oz) (11/20/23 1246)    Daily Weight  11/20/23 : 94.8 kg (208 lb 15.9 oz)      Scheduled medications  apixaban, 2.5 mg, oral, BID  mycophenolate, 1,000 mg, oral, BID  pantoprazole, 40 mg, oral, Daily  [START ON 11/27/2023] predniSONE, 10 mg, oral, Daily  predniSONE, 5 mg, oral, Daily      Continuous medications     PRN medications  PRN medications: acetaminophen, budesonide, dextrose 10 % in water (D10W), dextrose, glucagon, ipratropium-albuteroL     Physical Exam  Constitutional: Well developed, awake/alert/oriented x4, no distress, alert and cooperative  Skin: Warm and dry, no lesions, no rashes  Eyes: Anicteric, clear sclera  ENMT: mucous membranes moist, no apparent injury, no lesions seen  Head/Neck: Atraumatic  Respiratory: Lungs clear to auscultation bilaterally with no wheezes, rhonci or crackles  CV: Regular rate and rhythm, no murmurs or gallops auscultated  GI: Non-tender to deep palpation, no guarding  MSK: no joint swelling  Extremities: normal extremities, no cyanosis edema, contusions or wounds, no clubbing  Psych: Appropriate mood and behavior     Relevant Results  Results for orders placed or performed during the hospital encounter of 11/20/23 (from the past 24 hour(s))   POCT GLUCOSE   Result Value Ref Range     POCT Glucose 105 (H) 74 - 99 mg/dL   CBC   Result Value Ref Range    WBC 6.1 4.4 - 11.3 x10*3/uL    nRBC 1.3 (H) 0.0 - 0.0 /100 WBCs    RBC 2.90 (L) 4.00 - 5.20 x10*6/uL    Hemoglobin 8.6 (L) 12.0 - 16.0 g/dL    Hematocrit 29.0 (L) 36.0 - 46.0 %     80 - 100 fL    MCH 29.7 26.0 - 34.0 pg    MCHC 29.7 (L) 32.0 - 36.0 g/dL    RDW 21.1 (H) 11.5 - 14.5 %    Platelets 118 (L) 150 - 450 x10*3/uL   Basic metabolic panel   Result Value Ref Range    Glucose 66 (L) 74 - 99 mg/dL    Sodium 144 136 - 145 mmol/L    Potassium 3.9 3.5 - 5.3 mmol/L    Chloride 112 (H) 98 - 107 mmol/L    Bicarbonate 26 21 - 32 mmol/L    Anion Gap 10 10 - 20 mmol/L    Urea Nitrogen 40 (H) 6 - 23 mg/dL    Creatinine 1.97 (H) 0.50 - 1.05 mg/dL    eGFR 28 (L) >60 mL/min/1.73m*2    Calcium 8.7 8.6 - 10.3 mg/dL   POCT GLUCOSE   Result Value Ref Range    POCT Glucose 190 (H) 74 - 99 mg/dL   POCT GLUCOSE   Result Value Ref Range    POCT Glucose 128 (H) 74 - 99 mg/dL   POCT GLUCOSE   Result Value Ref Range    POCT Glucose 107 (H) 74 - 99 mg/dL   POCT GLUCOSE   Result Value Ref Range    POCT Glucose 102 (H) 74 - 99 mg/dL   POCT GLUCOSE   Result Value Ref Range    POCT Glucose 71 (L) 74 - 99 mg/dL   CBC   Result Value Ref Range    WBC 4.9 4.4 - 11.3 x10*3/uL    nRBC 1.8 (H) 0.0 - 0.0 /100 WBCs    RBC 2.78 (L) 4.00 - 5.20 x10*6/uL    Hemoglobin 8.1 (L) 12.0 - 16.0 g/dL    Hematocrit 29.3 (L) 36.0 - 46.0 %     (H) 80 - 100 fL    MCH 29.1 26.0 - 34.0 pg    MCHC 27.6 (L) 32.0 - 36.0 g/dL    RDW 21.0 (H) 11.5 - 14.5 %    Platelets 104 (L) 150 - 450 x10*3/uL   Comprehensive Metabolic Panel   Result Value Ref Range    Glucose 67 (L) 74 - 99 mg/dL    Sodium 143 136 - 145 mmol/L    Potassium 4.6 3.5 - 5.3 mmol/L    Chloride 112 (H) 98 - 107 mmol/L    Bicarbonate 24 21 - 32 mmol/L    Anion Gap 12 10 - 20 mmol/L    Urea Nitrogen 48 (H) 6 - 23 mg/dL    Creatinine 1.88 (H) 0.50 - 1.05 mg/dL    eGFR 30 (L) >60 mL/min/1.73m*2    Calcium 8.6 8.6 - 10.3 mg/dL     Albumin 3.2 (L) 3.4 - 5.0 g/dL    Alkaline Phosphatase 98 33 - 136 U/L    Total Protein 5.4 (L) 6.4 - 8.2 g/dL    AST 18 9 - 39 U/L    Bilirubin, Total 0.2 0.0 - 1.2 mg/dL    ALT 20 7 - 45 U/L        Image Results  CT abdomen pelvis wo IV contrast  Narrative: Interpreted By:  Deacon Munoz,   STUDY:  CT ABDOMEN PELVIS WO IV CONTRAST;  11/20/2023 10:25 pm      INDICATION:  Signs/Symptoms:epigastric/periumbilical abd pain w/ diarrhea.      COMPARISON:  8/28/2023      ACCESSION NUMBER(S):  [id number]      ORDERING CLINICIAN:  ARELIS VILLAGRAN      TECHNIQUE:  Contiguous axial images of the abdomen and pelvis were obtained  without intravenous contrast. Coronal and sagittal reformatted images  were obtained from the axial images.      FINDINGS:  There is limited evaluation of the lung bases.  Mild basilar subsegmental atelectasis.      Cardiomegaly and coronary artery atherosclerotic calcifications.      Evaluation of the abdominal viscera is limited secondary to lack of  intravenous contrast. Limited evaluation for liver mass on  noncontrast examination. The gallbladder surgically absent.      The pancreas, spleen, and adrenal glands appear unremarkable.      Evaluation of the kidneys is limited secondary to lack of intravenous  contrast. 2 mm nonobstructive right renal calculus. No  hydronephrosis. Right renal vascular calcifications.      Atherosclerotic calcification of the abdominal aorta and bilateral  iliac arteries.      No evidence of bowel obstruction or acute appendicitis. There is  colonic diverticulosis without evidence of acute diverticulitis.  There is underdistention versus wall thickening of the ascending  colon. The colon is diffusely underdistended and not well evaluated.      Limited evaluation of the urinary bladder is underdistention and beam  hardening artifact.      Postsurgical change of right hip arthroplasty resulting in beam  hardening artifact and limiting evaluation of the pelvis. There  is  stable advanced left hip osteoarthrosis with joint space loss and  subchondral sclerosis and cystic change and femoral head neck  junction osseous spurring.      Multilevel degenerative change of the lumbar spine. There is stable  multilevel degenerative endplate change. Stable mild compression  deformity of L1.      Impression: No evidence of bowel obstruction or acute appendicitis. Colon is  diffusely underdistended and not well evaluated. There is  underdistention versus wall thickening of the ascending colon which  may be infectious or inflammatory in etiology. Colonic diverticulosis  without evidence of acute diverticulitis.      Limited evaluation of the urinary bladder secondary to  underdistention and beam hardening artifact. Underdistention versus  mild urinary bladder wall thickening; please correlate with  urinalysis to exclude cystitis.      MACRO:  None      Signed by: Deacon Munoz 11/20/2023 11:26 PM  Dictation workstation:   [id number]  XR chest 1 view  Narrative: STUDY:  Chest Radiograph;  11/20/2023 3:08 PM.  INDICATION:  Weakness.  COMPARISON:  XR chest 10/19/2023.  ACCESSION NUMBER(S):  [id number]  ORDERING CLINICIAN:  BUSHRA NGUYEN  TECHNIQUE:  Frontal chest was obtained at 15:07 hours.  FINDINGS:  CARDIOMEDIASTINAL SILHOUETTE:  Cardiomediastinal silhouette is mildly enlarged in size. The aorta is  calcified and tortuous.     LUNGS:  Lungs are mildly hypoinflated, but appear clear.     ABDOMEN:  No remarkable upper abdominal findings.     BONES:  No acute osseous changes.  Impression: Stable mild enlargement of the cardiac silhouette. Mildly hypoinflated  lungs which appear clear. Calcified and tortuous aorta.   Signed by Alan Linton MD           Assessment/Plan   62-year-old female presenting for hypoglycemia and recurrent diarrhea.  Found to be norovirus positive, symptomatic management with resolution of symptoms on 11/23.  Evaluated by endocrinology due to recurrent history of  hypoglycemia, suspect secondary to secondary adrenal insufficiency, for which her fludrocortisone was discontinued and prednisone adjusted to 10 mg first thing in the morning and 5 mg at 2PM with the thought that patient may be a super-metabolizer of prednisone. Her continued asymptomatic hypoglycemic events were brought up to endocrinology who suggested maybe with patient's raynouds that finger glucose may be inaccurate and should attempt a repeat BS through ear. Evaluated by PT/OT, and pending precertification to SNF for discharge.    # Secondary adrenal insufficiency  # SLE on CellCept/prednisone  -Prednisone 10mg first this upon awakening, 5mg at 2PM Consider addition of Bactrim for PJP prophylaxis  -Endocrinology consulted, appreciate recommendations. Educated patient on taking further stress dosing if hypotensive or hypoglycemic at home  -Repeat POCS to earlobe if low POCS on finger    # Norovirus infection, resolved  -Symptoms resolved 11/23, if remains asymptomatic for 48 hours  -Discontinue precautions     # JED  -Creatinine today 1.91, with known baseline of around 1.6.   -Continue to trend cmp    # Pancytopenia  -CBC appears to be at baseline, bone marrow biopsy from 2020 suggesting MDS.  No evidence of bleeding, no blast noted on differential     # Asymptomatic bacteriuria  -Patient is largely asymptomatic, given history of prior C. difficile infection will elect to not continue treatment    DVT prophylaxis: Eliquis  Consults: Endocrinology    Dispo: Medically clear for discharge, pending precertification to SNF.       Reviewed case with senior resident and attending physician,  Lauri Silverio D.O.  Internal Medicine, PGY-1  Thank you!

## 2023-11-26 NOTE — NURSING NOTE
EOS: Patient has rested well through the night. She has had no complaints of pain or discomfort this shift. Her safety has been maintained. She is stable at the time of writing.

## 2023-11-26 NOTE — PROGRESS NOTES
"    Endocrinology Inpatient Consult Progress Note     PATIENT NAME: Bing Holliday  MRN: [id number]  DATE: 11/26/2023    CONSULTING PHYSICIAN: Dr. Wren  REASON FOR CONSULT: AI.  T2DM.      Interval Events     No acute events.   Low again this AM.   She felt nothing.   She states she is eating all of her meals.      Physical Examination     /65 (BP Location: Left arm, Patient Position: Lying)   Pulse 59   Temp 35.6 °C (96.1 °F) (Temporal)   Resp 16   Ht 1.803 m (5' 11\")   Wt 94.8 kg (208 lb 15.9 oz)   LMP  (LMP Unknown)   SpO2 100%   BMI 29.15 kg/m²   No acute distress.  Temporal wasting.  Regular rate and rhythm.  Nonlabored respiration.  Soft nontender nondistended abdomen.  No pedal edema bilaterally.  Appropriate affect.      Medications     Reviewed MAR       Data     Recent Labs and Imaging Reviewed      Assessment / Plan        # Hypoglycemia  Remains.   Only in the AM.   I suspect this is due to cortisol deficiency.   Will change her prednisone to 10mg in the AM and 5mg in the PM.   She may be hypermetabolizing her prednisone.   Query CYP induction from other meds?   Explained to patient the change.  Noted that primary team ordered cortisol, prednisone does not have much cross reactivity with the cortisol assay.      # Uncontrolled Type 2 Diabetes Mellitus  Home Regimen: None.  Hemoglobin A1c (7/23): 6.9%  Nutrtion: Regular Diet.     Continue to hold all therapies given problem.  She likely will have hyperglycemia when she takes steroids and has heavy CHO meal.   Nonetheless, it is too risky to put her on hypoglycemic agents as she may have profound hypoglycemia if she forgets to take her prednisone.     # Secondary Adrenal Insufficiency  Please see my initial consult note for my clinical formulation.  Change prednisone to 10mg qAM and 5mg q1400 as above.   Explained sick day rules.   Explained need for medical alert bracelet.  Discontinue fludrocortisone (this is only needed for primary " AI).  Perhaps her glucocorticoids could be weaned, but this should be considered by her outpatient endocrinologist, Dr. Chi. May be a difficult feat.     # Osteoporosis  In the setting of chronic glucocorticoid use.  This will be further managed as an outpatient.  She probably would benefit from a bisphosphonate.         Avi Sloan, DO  Endocrinology, Diabetes, and Metabolism    Available via EPIC Messenger    Please excuse and typographical or unwanted errors within this documentation as voice recognition software was used to dictate this note.

## 2023-11-27 ENCOUNTER — HOME HEALTH ADMISSION (OUTPATIENT)
Dept: HOME HEALTH SERVICES | Facility: HOME HEALTH | Age: 63
End: 2023-11-27
Payer: MEDICARE

## 2023-11-27 VITALS
SYSTOLIC BLOOD PRESSURE: 129 MMHG | TEMPERATURE: 96.4 F | HEART RATE: 62 BPM | RESPIRATION RATE: 18 BRPM | HEIGHT: 71 IN | WEIGHT: 209 LBS | OXYGEN SATURATION: 98 % | BODY MASS INDEX: 29.26 KG/M2 | DIASTOLIC BLOOD PRESSURE: 74 MMHG

## 2023-11-27 PROBLEM — E16.2 HYPOGLYCEMIA: Status: RESOLVED | Noted: 2023-06-22 | Resolved: 2023-11-27

## 2023-11-27 PROBLEM — E27.2 ADRENAL CRISIS (MULTI): Status: RESOLVED | Noted: 2023-03-06 | Resolved: 2023-11-27

## 2023-11-27 LAB
ALBUMIN SERPL BCP-MCNC: 3 G/DL (ref 3.4–5)
ALP SERPL-CCNC: 101 U/L (ref 33–136)
ALT SERPL W P-5'-P-CCNC: 19 U/L (ref 7–45)
ANION GAP SERPL CALC-SCNC: 11 MMOL/L (ref 10–20)
AST SERPL W P-5'-P-CCNC: 16 U/L (ref 9–39)
BILIRUB SERPL-MCNC: 0.2 MG/DL (ref 0–1.2)
BUN SERPL-MCNC: 49 MG/DL (ref 6–23)
CALCIUM SERPL-MCNC: 8.8 MG/DL (ref 8.6–10.3)
CHLORIDE SERPL-SCNC: 112 MMOL/L (ref 98–107)
CO2 SERPL-SCNC: 25 MMOL/L (ref 21–32)
CREAT SERPL-MCNC: 1.63 MG/DL (ref 0.5–1.05)
ERYTHROCYTE [DISTWIDTH] IN BLOOD BY AUTOMATED COUNT: 20.7 % (ref 11.5–14.5)
GFR SERPL CREATININE-BSD FRML MDRD: 36 ML/MIN/1.73M*2
GLUCOSE BLD MANUAL STRIP-MCNC: 148 MG/DL (ref 74–99)
GLUCOSE BLD MANUAL STRIP-MCNC: 154 MG/DL (ref 74–99)
GLUCOSE BLD MANUAL STRIP-MCNC: 166 MG/DL (ref 74–99)
GLUCOSE BLD MANUAL STRIP-MCNC: 98 MG/DL (ref 74–99)
GLUCOSE SERPL-MCNC: 98 MG/DL (ref 74–99)
HCT VFR BLD AUTO: 26.7 % (ref 36–46)
HGB BLD-MCNC: 7.9 G/DL (ref 12–16)
MCH RBC QN AUTO: 29 PG (ref 26–34)
MCHC RBC AUTO-ENTMCNC: 29.6 G/DL (ref 32–36)
MCV RBC AUTO: 98 FL (ref 80–100)
NRBC BLD-RTO: 1 /100 WBCS (ref 0–0)
PLATELET # BLD AUTO: 104 X10*3/UL (ref 150–450)
POTASSIUM SERPL-SCNC: 4.8 MMOL/L (ref 3.5–5.3)
PROT SERPL-MCNC: 5.5 G/DL (ref 6.4–8.2)
RBC # BLD AUTO: 2.72 X10*6/UL (ref 4–5.2)
SODIUM SERPL-SCNC: 143 MMOL/L (ref 136–145)
WBC # BLD AUTO: 5.8 X10*3/UL (ref 4.4–11.3)

## 2023-11-27 PROCEDURE — 36415 COLL VENOUS BLD VENIPUNCTURE: CPT

## 2023-11-27 PROCEDURE — 2500000004 HC RX 250 GENERAL PHARMACY W/ HCPCS (ALT 636 FOR OP/ED): Performed by: INTERNAL MEDICINE

## 2023-11-27 PROCEDURE — 2500000004 HC RX 250 GENERAL PHARMACY W/ HCPCS (ALT 636 FOR OP/ED)

## 2023-11-27 PROCEDURE — 85027 COMPLETE CBC AUTOMATED: CPT

## 2023-11-27 PROCEDURE — 80053 COMPREHEN METABOLIC PANEL: CPT

## 2023-11-27 PROCEDURE — 99238 HOSP IP/OBS DSCHRG MGMT 30/<: CPT

## 2023-11-27 PROCEDURE — 97116 GAIT TRAINING THERAPY: CPT | Mod: GP,CQ

## 2023-11-27 PROCEDURE — 2500000001 HC RX 250 WO HCPCS SELF ADMINISTERED DRUGS (ALT 637 FOR MEDICARE OP)

## 2023-11-27 PROCEDURE — 82947 ASSAY GLUCOSE BLOOD QUANT: CPT

## 2023-11-27 RX ORDER — PREDNISONE 5 MG/1
5 TABLET ORAL SEE ADMIN INSTRUCTIONS
Qty: 30 TABLET | Refills: 2 | Status: SHIPPED | OUTPATIENT
Start: 2023-11-27 | End: 2024-01-28 | Stop reason: HOSPADM

## 2023-11-27 RX ORDER — PREDNISONE 10 MG/1
10 TABLET ORAL EVERY MORNING
Qty: 30 TABLET | Refills: 2 | Status: SHIPPED | OUTPATIENT
Start: 2023-11-27 | End: 2024-01-28 | Stop reason: HOSPADM

## 2023-11-27 RX ADMIN — PREDNISONE 10 MG: 10 TABLET ORAL at 06:20

## 2023-11-27 RX ADMIN — MYCOPHENOLATE MOFETIL 1000 MG: 250 CAPSULE ORAL at 09:01

## 2023-11-27 RX ADMIN — APIXABAN 2.5 MG: 2.5 TABLET, FILM COATED ORAL at 09:01

## 2023-11-27 RX ADMIN — PANTOPRAZOLE SODIUM 40 MG: 40 TABLET, DELAYED RELEASE ORAL at 09:01

## 2023-11-27 RX ADMIN — PREDNISONE 5 MG: 5 TABLET ORAL at 14:17

## 2023-11-27 ASSESSMENT — COGNITIVE AND FUNCTIONAL STATUS - GENERAL
STANDING UP FROM CHAIR USING ARMS: A LITTLE
CLIMB 3 TO 5 STEPS WITH RAILING: A LITTLE
CLIMB 3 TO 5 STEPS WITH RAILING: A LITTLE
MOBILITY SCORE: 20
PERSONAL GROOMING: A LITTLE
MOVING TO AND FROM BED TO CHAIR: A LITTLE
DRESSING REGULAR LOWER BODY CLOTHING: A LITTLE
HELP NEEDED FOR BATHING: A LITTLE
STANDING UP FROM CHAIR USING ARMS: A LITTLE
DAILY ACTIVITIY SCORE: 19
MOBILITY SCORE: 20
DRESSING REGULAR UPPER BODY CLOTHING: A LITTLE
TOILETING: A LITTLE
WALKING IN HOSPITAL ROOM: A LITTLE
MOVING TO AND FROM BED TO CHAIR: A LITTLE
WALKING IN HOSPITAL ROOM: A LITTLE

## 2023-11-27 ASSESSMENT — PAIN - FUNCTIONAL ASSESSMENT: PAIN_FUNCTIONAL_ASSESSMENT: 0-10

## 2023-11-27 ASSESSMENT — PAIN SCALES - GENERAL: PAINLEVEL_OUTOF10: 0 - NO PAIN

## 2023-11-27 NOTE — PROGRESS NOTES
Physical Therapy    Physical Therapy Treatment    Patient Name: Bing Holliday  MRN: [id number]  Today's Date: 11/27/2023  Time Calculation  Start Time: 0855  Stop Time: 0920  Time Calculation (min): 25 min       Assessment/Plan   PT Assessment  PT Assessment Results: Decreased endurance, Impaired balance  Rehab Prognosis: Good  Evaluation/Treatment Tolerance: Patient limited by fatigue  Medical Staff Made Aware: Yes  End of Session Communication: Bedside nurse, Care Coordinator  End of Session Patient Position: Up in chair, Alarm on  PT Plan  Inpatient/Swing Bed or Outpatient: Inpatient  PT Plan  Treatment/Interventions: Bed mobility, Transfer training, Gait training, Stair training  PT Plan: Skilled PT  PT Frequency: 3 times per week  PT Discharge Recommendations: Moderate intensity level of continued care  Equipment Recommended upon Discharge: Wheeled walker  PT Recommended Transfer Status: Assistive device, Stand by assist  PT - OK to Discharge: Yes     11/27/23 0855   PT  Visit   PT Received On 11/27/23   Response to Previous Treatment Patient with no complaints from previous session.   General   Prior to Session Communication Bedside nurse   Patient Position Received Bed, 3 rail up;Alarm on   Preferred Learning Style verbal;visual   Precautions   Medical Precautions Fall precautions   Pain Assessment   Pain Assessment 0-10   Pain Score 0 - No pain   Cognition   Overall Cognitive Status WFL   Orientation Level Oriented X4   Bed Mobility   Bed Mobility Yes   Bed Mobility 1   Bed Mobility 1 Supine to sitting   Level of Assistance 1 Close supervision   Ambulation/Gait Training   Ambulation/Gait Training Performed Yes   Ambulation/Gait Training 1   Surface 1 Level tile   Device 1 Rolling walker   Gait Support Devices Gait belt   Assistance 1 Close supervision   Quality of Gait 1 NBOS  (slow pace, grossly steady)   Comments/Distance (ft) 1 65' x2   Transfers   Transfer Yes   Transfer 1   Transfer From 1 Bed to    Transfer to 1 Stand   Technique 1 Sit to stand   Transfer Device 1 Walker;Gait belt   Transfer Level of Assistance 1 Close supervision   Transfers 2   Transfer From 2 Stand to   Transfer to 2 Chair with arms   Technique 2 Stand to sit   Transfer Device 2 Walker   Transfer Level of Assistance 2 Close supervision   Stairs   Stairs Yes   Stairs   Rails 1 Right   Curb Step 1 No   Device 1 Railing   Support Devices 1 Gait belt   Assistance 1 Minimum assistance   Comment/Number of Steps 1 x3 steps   PT Assessment   PT Assessment Results Decreased endurance;Impaired balance   Rehab Prognosis Good   End of Session Communication Bedside nurse;Care Coordinator   End of Session Patient Position Up in chair;Alarm on   Outpatient Education   Individual(s) Educated Patient   Education Provided Fall Risk   PT Plan   Inpatient/Swing Bed or Outpatient Inpatient   PT Plan   Treatment/Interventions Bed mobility;Transfer training;Gait training;Stair training   PT Plan Skilled PT   PT Frequency 3 times per week   PT Discharge Recommendations Moderate intensity level of continued care   Equipment Recommended upon Discharge Wheeled walker   PT Recommended Transfer Status Assistive device;Stand by assist     Outcome Measures:  [facility] Basic Mobility  Turning from your back to your side while in a flat bed without using bedrails: None  Moving from lying on your back to sitting on the side of a flat bed without using bedrails: None  Moving to and from bed to chair (including a wheelchair): A little  Standing up from a chair using your arms (e.g. wheelchair or bedside chair): A little  To walk in hospital room: A little  Climbing 3-5 steps with railing: A little  Basic Mobility - Total Score: 20  Education Documentation  Mobility Training, taught by Claudio Eddy PTA at 11/27/2023  9:29 AM.  Learner: Patient  Readiness: Eager  Method: Explanation  Response: Verbalizes Understanding, Demonstrated Understanding    Education Comments  No comments  found.            EDUCATION:  Outpatient Education  Individual(s) Educated: Patient  Education Provided: Fall Risk    GOALS:  Encounter Problems       Encounter Problems (Active)       PT Problem       Pt will be able to perform all bed mobility tasks with Min A.  (Progressing)       Start:  11/21/23    Expected End:  11/25/23            Pt will perform all transfers with Min A and FWW with proper safety mechanics.   (Progressing)       Start:  11/21/23    Expected End:  11/25/23            Pt will ambulate 15 ft with Min-Mod A using FWW for improved functional independence.  (Progressing)       Start:  11/21/23    Expected End:  11/25/23            Pt will be able to negotiate 3 steps with 1 HR with Min-Mod A.  (Progressing)       Start:  11/21/23    Expected End:  11/25/23            Pt will demonstrate good dynamic sit balance for completion of therapeutic exercises and functional tasks.  (Progressing)       Start:  11/21/23    Expected End:  11/25/23               Pain - Adult

## 2023-11-27 NOTE — NURSING NOTE
EOS: Patient rested well through the night. She has not complained of any pain or discomfort. Her safety has been maintained. She is stable at the time of writing.

## 2023-11-27 NOTE — PROGRESS NOTES
"    Endocrinology Inpatient Consult Progress Note     PATIENT NAME: Bing Holliday  MRN: [id number]  DATE: 11/27/2023    CONSULTING PHYSICIAN: Dr. Wren  REASON FOR CONSULT: AI.  T2DM.      Interval Events     No acute events.   Seen this AM.   She had finished all of breakfast.   She feel no different this morning compared to other mornings.  Asking about her dosages of her medications.   Asking when she will be leaving the hospital.      Physical Examination     /87   Pulse 78   Temp 36.1 °C (97 °F)   Resp 18   Ht 1.803 m (5' 11\")   Wt 94.8 kg (208 lb 15.9 oz)   LMP  (LMP Unknown)   SpO2 100%   BMI 29.15 kg/m²   No acute distress.  Temporal wasting.  Regular rate and rhythm.  Nonlabored respiration.  Soft nontender nondistended abdomen.  No pedal edema bilaterally.  Appropriate affect.      Medications     Reviewed MAR       Data     Recent Labs and Imaging Reviewed      Assessment / Plan        # Hypoglycemia  No more low sugars this AM.  I think we continue with our current strategy.  Continue prednisone 10mg qAM and 5mg in the PM.   I explained again to the patient.   I explained sick day rules.   I explained the need for her to get a medical alert bracelet.   I explained the need for her to come to the ER if she cannot keep down her prednisone.    There was a question if this was true hypoglycemia given lack of symptoms.   It appears it is given that it has shown up on chemistry and not just POC glucose testing.     # Uncontrolled Type 2 Diabetes Mellitus  Home Regimen: None.  Hemoglobin A1c (7/23): 6.9%  Nutrtion: Regular Diet.     Continue to hold all therapies given problem.  She likely will have hyperglycemia when she takes steroids and has heavy CHO meal.   Nonetheless, it is too risky to put her on hypoglycemic agents as she may have profound hypoglycemia if she forgets to take her prednisone.     # Secondary Adrenal Insufficiency  Please see my initial consult note for my clinical " formulation.  Change prednisone to 10mg qAM and 5mg q1400 as above.   Explained sick day rules.   Explained need for medical alert bracelet.  Discontinue fludrocortisone (this is only needed for primary AI).  Perhaps her glucocorticoids could be weaned, but this should be considered by her outpatient endocrinologist, Dr. Chi. May be a difficult feat.     # Osteoporosis  In the setting of chronic glucocorticoid use.  This will be further managed as an outpatient.  She probably would benefit from a bisphosphonate.         Avi Sloan, DO  Endocrinology, Diabetes, and Metabolism    Available via EP[employer] Messenger    Please excuse and typographical or unwanted errors within this documentation as voice recognition software was used to dictate this note.

## 2023-11-27 NOTE — DISCHARGE INSTRUCTIONS
It would be important to take your prednisone 10 mg first thing in the morning when you wake up and an additional 5 mg of prednisone scheduled at 2 PM every day. It is important to take your prednisone as scheduled everyday. Missing doses will lead to low blood pressures, low blood sugars, and overall lethargy/weakness possibly resulting in further hospitalization. If you do experience these symptoms, however, please do not hesitate to return to the hospital as they can be life threatening.    When you encounter increased stress such as illness, dehydration, infection please take double the dose of your prednisone scheduled for 2-3 days. This is called stress dosing and will help compensate for your body's inability to produce stress hormones.

## 2023-11-27 NOTE — PROGRESS NOTES
PT recommends home with [facility]. Patient is current with [facility] for SN PT OT. She wants to continue with their services. PCN to send referral. Plan discharge today.    Irma Gutiérrez RN

## 2023-11-27 NOTE — HOSPITAL COURSE
Bing Holliday  is a 62 y.o. 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    In the ED she was confused, but hemodynamically stable with a blood pressure of 101/68.  Her glucose on arrival was 58.  She had been complaining of diarrhea multiple times an hour over the last few days and was found to be norovirus positive.  A UA had not esterases and WBCs, she was treated with 1 day of Rocephin and patient was asymptomatic so this was not continued.  C. difficile was negative and her diarrhea gradually resolved.  Endocrinology was consulted who discontinued patient's fludrocortisone as patient does not have primary AI.  She had 2 episodes of asymptomatic hypoglycemia which was treated with glucagon, but did not have any further episodes after endocrinology adjusted her prednisone regimen.  He adjusted patient's prednisone dosing to 10 mg immediately upon waking up and 5 mg scheduled at 2 PM.  Our endocrinologist suspected that patient may be a hyper metabolizer of prednisone, or malnourished with depleted glycogen storages, or non-compliant with medications.  Patient was discharged with instructions to follow-up with her PCP and Dr. Chi her endocrinologist in [place].

## 2023-11-27 NOTE — DISCHARGE SUMMARY
Discharge Diagnosis  Hypoglycemia    Issues Requiring Follow-Up  Location changed to prednisone 10 mg when she wakes up and 5 mg at 2 PM in the afternoon.  Follow-up with endocrinologist and primary care physician.    Discharge Meds     Your medication list        CHANGE how you take these medications        Instructions Last Dose Given Next Dose Due   predniSONE 10 mg tablet  Commonly known as: Deltasone  What changed:   medication strength  how much to take  how to take this  when to take this  additional instructions      Take 1 tablet (10 mg) by mouth once daily in the morning. Take first thing when you wake up every morning.       predniSONE 5 mg tablet  Commonly known as: Deltasone  What changed: You were already taking a medication with the same name, and this prescription was added. Make sure you understand how and when to take each.      Take 1 tablet (5 mg) by mouth see administration instructions. Take one tablet everyday at 2PM scheduled              CONTINUE taking these medications        Instructions Last Dose Given Next Dose Due   acetaminophen 325 mg tablet  Commonly known as: Tylenol           amLODIPine 2.5 mg tablet  Commonly known as: Norvasc           apixaban 5 mg tablet  Commonly known as: Eliquis      Take 0.5 tablets (2.5 mg) by mouth 2 times a day.       atorvastatin 40 mg tablet  Commonly known as: Lipitor      Take 1 tablet (40 mg) by mouth once daily.       Benlysta 400 mg recon soln IV injection  Generic drug: belimumab           belimumab 120 mg recon soln IV injection  Commonly known as: Benlysta      Infuse 10mg/kg (900mg) IV For Maintenance: every 4 weeks       Ca carb-D3-mag fz-pcz-eebo-Zn 300 mg-20 mcg- 25 mg-0.5 mg tablet           Dexcom G6  misc  Generic drug: Dexcom G4 platinum       Use as instructed       FreeStyle Bradley 3 Sensor device  Generic drug: blood-glucose sensor      USE AS DIRECTED TO TEST BLOOD GLUCOSE. CHANGE EVERY 14 DAYS       Dexcom G6 Sensor  device  Generic drug: blood-glucose sensor      Use to check sugars 3 times daily       Dexcom G6 Transmitter device  Generic drug: Dexcom G4 platinum transmitter      Use as instructed       fluticasone-umeclidin-vilanter 200-62.5-25 mcg blister with device  Commonly known as: TRELEGY-ELLIPTA      Inhale 1 puff once daily.       folic acid 1 mg tablet  Commonly known as: Folvite      TAKE 1 TABLET BY MOUTH EVERY DAY       magnesium oxide 400 mg tablet  Commonly known as: Mag-Ox           melatonin 5 mg tablet           multivitamin tablet           mycophenolate 500 mg tablet  Commonly known as: Cellcept           OneTouch Delica Plus Lancet 30 gauge misc  Generic drug: lancets           OneTouch Verio test strips strip  Generic drug: blood sugar diagnostic      1 strip in the morning, at noon, in the evening, and at bedtime.       pantoprazole 40 mg EC tablet  Commonly known as: ProtoNix      TAKE 1 TABLET BY MOUTH EVERY DAY       risperiDONE 0.5 mg tablet  Commonly known as: RisperDAL      TAKE 1 TABLET BY MOUTH AT BEDTIME       torsemide 10 mg tablet  Commonly known as: Demadex      TAKE 1 TABLET BY MOUTH ONCE DAILY              STOP taking these medications      diphenoxylate-atropine 2.5-0.025 mg tablet  Commonly known as: Lomotil        fludrocortisone 0.1 mg tablet  Commonly known as: Florinef        loperamide 2 mg capsule  Commonly known as: Imodium                  Where to Get Your Medications        These medications were sent to [facility]/pharmacy #[id number] - [place], [place] - [address] [address]  [address] [facility] [address]      Phone: [phone]   predniSONE 10 mg tablet  predniSONE 5 mg tablet         Test Results Pending At Discharge  Pending Labs       Order Current Status    Sulfonylurea Hypoglycemics,Serum In process    Extra Tubes Preliminary result    Light Blue Top Preliminary result            Hospital Course  Bing Holliday  is a 62 y.o. 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    In the ED she was confused, but hemodynamically stable with a blood pressure of 101/68.  Her glucose on arrival was 58.  She had been complaining of diarrhea multiple times an hour over the last few days and was found to be norovirus positive.  A UA had not esterases and WBCs, she was treated with 1 day of Rocephin and patient was asymptomatic so this was not continued.  C. difficile was negative and her diarrhea gradually resolved.  Endocrinology was consulted who discontinued patient's fludrocortisone as patient does not have primary AI.  She had 2 episodes of asymptomatic hypoglycemia which was treated with glucagon, but did not have any further episodes after endocrinology adjusted her prednisone regimen.  He adjusted patient's prednisone dosing to 10 mg immediately upon waking up and 5 mg scheduled at 2 PM.  Our endocrinologist suspected that patient may be a hyper metabolizer of prednisone, or malnourished with depleted glycogen storages, or non-compliant with medications.  Patient was discharged with instructions to follow-up with her PCP and Dr. Chi her endocrinologist in [place].    Pertinent Physical Exam At Time of Discharge  Physical Exam  Constitutional: Well developed, awake/alert/oriented x4, no distress, alert and cooperative  Skin: Warm and dry, no lesions, no rashes  Eyes: Anicteric, clear sclera  ENMT: mucous membranes moist, no apparent injury, no lesions seen  Head/Neck: Atraumatic  Respiratory: Lungs clear to auscultation bilaterally with no wheezes, rhonci or crackles  CV: Regular rate and rhythm, no murmurs or gallops auscultated  GI: Non-tender to deep palpation, no  guarding  MSK: no joint swelling  Extremities: normal extremities, no cyanosis edema, contusions or wounds, no clubbing  Psych: Appropriate mood and behavior  Outpatient Follow-Up  Future Appointments   Date Time Provider Department Center   11/30/2023  3:30 PM Claudio Hood DO [email or web address] [place]   12/6/2023  2:30 PM Meir Moon PA-C [id number] [place]   1/8/2024  3:40 PM Sarah Castellanos DO [id number] [place]   2/13/2024 10:30 AM Michael Arenas MD [id number] [place]         Lauri HostofferDO

## 2023-11-27 NOTE — PROGRESS NOTES
Orders were sent to [facility] home care for resumption of services. They confirmed they are active and can resume care in 24-48 hrs.       Delano Brian

## 2023-11-28 ENCOUNTER — DOCUMENTATION (OUTPATIENT)
Dept: PRIMARY CARE | Facility: CLINIC | Age: 63
End: 2023-11-28

## 2023-11-28 ENCOUNTER — APPOINTMENT (OUTPATIENT)
Dept: NEUROSURGERY | Facility: CLINIC | Age: 63
End: 2023-11-28
Payer: MEDICARE

## 2023-11-28 NOTE — PROGRESS NOTES
Discharge facility: [facility]  Discharge diagnosis: UTI  Admission date: 11/20  Discharge date: 11/27    PCP Appointment Date: 11/30  Hospital Encounter and Summary: not available at this time     Patient has PCP hospital follow up 11.30.23 qualifies patient for TCM billing,

## 2023-11-29 ENCOUNTER — APPOINTMENT (OUTPATIENT)
Dept: PRIMARY CARE | Facility: CLINIC | Age: 63
End: 2023-11-29
Payer: MEDICARE

## 2023-11-30 ENCOUNTER — HOSPITAL ENCOUNTER (OUTPATIENT)
Dept: CARDIOLOGY | Facility: HOSPITAL | Age: 63
Discharge: HOME | End: 2023-11-30
Payer: MEDICARE

## 2023-11-30 ENCOUNTER — OFFICE VISIT (OUTPATIENT)
Dept: PRIMARY CARE | Facility: CLINIC | Age: 63
End: 2023-11-30
Payer: MEDICARE

## 2023-11-30 VITALS
OXYGEN SATURATION: 99 % | TEMPERATURE: 96.7 F | SYSTOLIC BLOOD PRESSURE: 122 MMHG | BODY MASS INDEX: 32.34 KG/M2 | WEIGHT: 231 LBS | DIASTOLIC BLOOD PRESSURE: 68 MMHG | RESPIRATION RATE: 16 BRPM | HEART RATE: 64 BPM | HEIGHT: 71 IN

## 2023-11-30 DIAGNOSIS — Z09 HOSPITAL DISCHARGE FOLLOW-UP: ICD-10-CM

## 2023-11-30 DIAGNOSIS — E78.5 HYPERLIPIDEMIA, UNSPECIFIED HYPERLIPIDEMIA TYPE: ICD-10-CM

## 2023-11-30 DIAGNOSIS — I42.9 CARDIOMYOPATHY, UNSPECIFIED TYPE (MULTI): ICD-10-CM

## 2023-11-30 DIAGNOSIS — E11.21 DIABETIC NEPHROPATHY ASSOCIATED WITH TYPE 2 DIABETES MELLITUS (MULTI): ICD-10-CM

## 2023-11-30 DIAGNOSIS — R57.9 SHOCK, UNSPECIFIED (MULTI): ICD-10-CM

## 2023-11-30 DIAGNOSIS — E66.09 CLASS 1 OBESITY DUE TO EXCESS CALORIES WITH SERIOUS COMORBIDITY AND BODY MASS INDEX (BMI) OF 32.0 TO 32.9 IN ADULT: ICD-10-CM

## 2023-11-30 DIAGNOSIS — N28.9 ABNORMAL KIDNEY FUNCTION: ICD-10-CM

## 2023-11-30 DIAGNOSIS — E11.42 DM TYPE 2 WITH DIABETIC PERIPHERAL NEUROPATHY (MULTI): ICD-10-CM

## 2023-11-30 DIAGNOSIS — M54.9 CHRONIC BACK PAIN, UNSPECIFIED BACK LOCATION, UNSPECIFIED BACK PAIN LATERALITY: ICD-10-CM

## 2023-11-30 DIAGNOSIS — E16.2 HYPOGLYCEMIA: ICD-10-CM

## 2023-11-30 DIAGNOSIS — M32.14 LUPUS NEPHRITIS (MULTI): ICD-10-CM

## 2023-11-30 DIAGNOSIS — E55.9 VITAMIN D DEFICIENCY: ICD-10-CM

## 2023-11-30 DIAGNOSIS — G89.29 CHRONIC BACK PAIN, UNSPECIFIED BACK LOCATION, UNSPECIFIED BACK PAIN LATERALITY: ICD-10-CM

## 2023-11-30 DIAGNOSIS — E16.2 LOW BLOOD SUGAR READING: ICD-10-CM

## 2023-11-30 DIAGNOSIS — E44.0 MODERATE PROTEIN-CALORIE MALNUTRITION (MULTI): Primary | ICD-10-CM

## 2023-11-30 LAB
ATRIAL RATE: 67 BPM
ATRIAL RATE: 76 BPM
P AXIS: 28 DEGREES
P AXIS: 46 DEGREES
P OFFSET: 188 MS
P OFFSET: 192 MS
P ONSET: 128 MS
P ONSET: 137 MS
PR INTERVAL: 162 MS
PR INTERVAL: 178 MS
Q ONSET: 217 MS
Q ONSET: 218 MS
QRS COUNT: 11 BEATS
QRS COUNT: 12 BEATS
QRS DURATION: 102 MS
QRS DURATION: 92 MS
QT INTERVAL: 366 MS
QT INTERVAL: 414 MS
QTC CALCULATION(BAZETT): 411 MS
QTC CALCULATION(BAZETT): 437 MS
QTC FREDERICIA: 395 MS
QTC FREDERICIA: 429 MS
R AXIS: -10 DEGREES
R AXIS: -15 DEGREES
T AXIS: -13 DEGREES
T AXIS: 195 DEGREES
T OFFSET: 401 MS
T OFFSET: 424 MS
VENTRICULAR RATE: 67 BPM
VENTRICULAR RATE: 76 BPM

## 2023-11-30 PROCEDURE — 3044F HG A1C LEVEL LT 7.0%: CPT | Performed by: FAMILY MEDICINE

## 2023-11-30 PROCEDURE — 3074F SYST BP LT 130 MM HG: CPT | Performed by: FAMILY MEDICINE

## 2023-11-30 PROCEDURE — 93005 ELECTROCARDIOGRAM TRACING: CPT | Mod: MUE

## 2023-11-30 PROCEDURE — 93005 ELECTROCARDIOGRAM TRACING: CPT

## 2023-11-30 PROCEDURE — 3066F NEPHROPATHY DOC TX: CPT | Performed by: FAMILY MEDICINE

## 2023-11-30 PROCEDURE — 3078F DIAST BP <80 MM HG: CPT | Performed by: FAMILY MEDICINE

## 2023-11-30 PROCEDURE — 99496 TRANSJ CARE MGMT HIGH F2F 7D: CPT | Performed by: FAMILY MEDICINE

## 2023-11-30 PROCEDURE — 1036F TOBACCO NON-USER: CPT | Performed by: FAMILY MEDICINE

## 2023-11-30 RX ORDER — HYDROCODONE BITARTRATE AND ACETAMINOPHEN 5; 325 MG/1; MG/1
1 TABLET ORAL 2 TIMES DAILY PRN
Qty: 60 TABLET | Refills: 0 | Status: SHIPPED | OUTPATIENT
Start: 2023-11-30 | End: 2023-12-30

## 2023-11-30 NOTE — PROGRESS NOTES
"Subjective   Patient ID: Bing Holliday is a 63 y.o. female who presents for Hospital Follow-up.    [facility] [place]    Hospital follow up.  KRISTAL complete  Admitted to [facility].  Admission dates 11/20/23 to11/27/23.  Admitted for Hypoglycemia.   Days since discharge 3day ago.  Patient had BW, CT, XR, EKG .     Patient advised to follow up with PCP.    Patient was prescribed  upped done on prednisone to 10mg    Admits to left foot pain.  She was seen by podiatry yesterday.    No other concern or question      Review of systems  ; Patient seen today for exam denies any problems with headaches or vision, denies any shortness of breath chest pain nausea or vomiting, no black stool no blood in the stool no heartburn type symptoms denies any problems with constipation or diarrhea, and no dysuria-type symptoms    The patient's allergies medications were reviewed with them today    The patient's social family and surgical history or also reviewed here today, along with her past medical history.     Objective     Alert and active in  no acute distress  HEENT TMs clear oropharynx negative nares clear no drainage noted neck supple  With no adenopathy   Heart regular rate and rhythm without murmur and no carotid bruits  Lungs- clear to auscultation bilaterally, no wheeze or rhonchi noted  Thyroid -negative masses or nodularity  Abdomen- soft times four quadrants, bowel sounds positive no masses or organomegaly, negative tenderness guarding or rebound  Neurological exam unremarkable- DTRs in upper and lower extremities within normal limits. //Bilateral hands with contracture doing well with stylus using her phone  skin -no lesions noted    Bilateral leg swelling.  Left greater than right    /68 (BP Location: Right arm, Patient Position: Sitting, BP Cuff Size: Large adult)   Pulse 64   Temp 35.9 °C (96.7 °F) (Temporal)   Resp 16   Ht 1.803 m (5' 11\")   Wt 105 kg (231 lb)   LMP  (LMP Unknown)   SpO2 99%   BMI 32.22 kg/m² " "    Allergies   Allergen Reactions    Ace Inhibitors Other, Angioedema and Swelling     \"FACIAL TWISTING\"    'FACIAL TWISTING\" \"LOOKS LIKE I HAD A STROKE IN MY SLEEP\"    Hydroxychloroquine Unknown     RETINAL BLEEDING    RETINAL BLEEDING, Eye problems    Lisinopril Swelling     PT. CLAIMS SWOLLEN FACE    Penicillins Unknown     tolerates cephalosporins    From Childhood    Mbr sts that she was told since young that she is allergic    Sulfa (Sulfonamide Antibiotics) Hives       Assessment/Plan   Problem List Items Addressed This Visit       Hyperlipidemia    Cardiomyopathy (CMS/HCC)    Low blood sugar reading    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HCC)    Vitamin D deficiency    DM type 2 with diabetic peripheral neuropathy (CMS/HCC)    Diabetic nephropathy (CMS/[facility])    Moderate protein-calorie malnutrition (CMS/[facility]) - Primary    Shock, unspecified (CMS/[facility])     Other Visit Diagnoses       Hospital discharge follow-up        Class 1 obesity due to excess calories with serious comorbidity and body mass index (BMI) of 32.0 to 32.9 in adult        BMI 32.0-32.9,adult        Hypoglycemia        Relevant Orders    CBC and Auto Differential    Comprehensive Metabolic Panel    Hemoglobin A1C    Chronic back pain, unspecified back location, unspecified back pain laterality        Relevant Medications    HYDROcodone-acetaminophen (Norco) 5-325 mg tablet    Abnormal kidney function        Relevant Orders    CBC and Auto Differential    Comprehensive Metabolic Panel          Discussed patient's BMI and to institute calorie reduction and increase exercise to decrease risk of diabetes and heart disease in the future.    Reviewed BW, CT, XR, EKG.    Norco prescribed today.    Recommend a medication box that works with her regimen.    Restart Torsemide every other day.  We will continue to monitor kidneys.    Continue to follow up with endocrine.    If anything worsens or changes please call us at once, follow up in the office as " planned.    Scribe Attestation  By signing my name below, I, Di Madsen MA, Scribclari   attest that this documentation has been prepared under the direction and in the presence of Claudio Hood DO.

## 2023-12-06 ENCOUNTER — APPOINTMENT (OUTPATIENT)
Dept: NEUROSURGERY | Facility: CLINIC | Age: 63
End: 2023-12-06
Payer: MEDICARE

## 2023-12-07 ENCOUNTER — TELEPHONE (OUTPATIENT)
Dept: ADMINISTRATIVE | Age: 63
End: 2023-12-07

## 2023-12-07 NOTE — TELEPHONE ENCOUNTER
Patient was informed to increase prednisone medication about 2-3 weeks ago but it has not helping. Would like to know what else can be done?     Please advise

## 2023-12-08 ENCOUNTER — TELEMEDICINE (OUTPATIENT)
Dept: PRIMARY CARE | Facility: CLINIC | Age: 63
End: 2023-12-08
Payer: MEDICARE

## 2023-12-08 DIAGNOSIS — E11.3591: ICD-10-CM

## 2023-12-08 DIAGNOSIS — E78.5 HYPERLIPIDEMIA, UNSPECIFIED HYPERLIPIDEMIA TYPE: ICD-10-CM

## 2023-12-08 DIAGNOSIS — J44.9 OBSTRUCTIVE LUNG DISEASE (MULTI): ICD-10-CM

## 2023-12-08 DIAGNOSIS — M32.9 LUPUS (MULTI): ICD-10-CM

## 2023-12-08 DIAGNOSIS — R25.1 SHAKINESS: Primary | ICD-10-CM

## 2023-12-08 DIAGNOSIS — M62.838 MUSCLE SPASMS OF BOTH LOWER EXTREMITIES: ICD-10-CM

## 2023-12-08 PROCEDURE — 99214 OFFICE O/P EST MOD 30 MIN: CPT | Performed by: FAMILY MEDICINE

## 2023-12-08 NOTE — PROGRESS NOTES
"Subjective   Patient ID: Bing Holliday is a 63 y.o. female who presents for legs shaking.//Virtual visit as patient is homebound at this time  HPI    Pt is doing virtual for shaking legs  Ongoing for 2 days  Pt states that yesterday was really bad with shaking of legs  But today is little better  Pt state that she spoke to her Endocrinologist he suggest that it could be her   Prednisone might be causing the shakiness in her legs  Pt want Dr Hood opinion .   She did check her sugars, and sugars were normal.  She is taking the water pill.  She was much better today.   She does not know why she is better today.      .  Has no other new problem /question.    Review of systems  ; Patient seen today for exam denies any problems with headaches or vision, denies any shortness of breath chest pain nausea or vomiting, no black stool no blood in the stool no heartburn type symptoms denies any problems with constipation or diarrhea, and no dysuria-type symptoms    The patient's allergies medications were reviewed with them today    The patient's social family and surgical history or also reviewed here today, along with her past medical history.     Objective     Alert and active in  no acute distress  No exam was performed on video she was in no distress      LMP  (LMP Unknown)     Allergies   Allergen Reactions    Ace Inhibitors Other, Angioedema and Swelling     \"FACIAL TWISTING\"    'FACIAL TWISTING\" \"LOOKS LIKE I HAD A STROKE IN MY SLEEP\"    Hydroxychloroquine Unknown     RETINAL BLEEDING    RETINAL BLEEDING, Eye problems    Lisinopril Swelling     PT. CLAIMS SWOLLEN FACE    Penicillins Unknown     tolerates cephalosporins    From Childhood    Mbr sts that she was told since young that she is allergic    Sulfa (Sulfonamide Antibiotics) Hives       Assessment/Plan   Problem List Items Addressed This Visit       Lupus (CMS/HCC)    Hyperlipidemia    Proliferative diabetic retinopathy of right eye without macular edema " determined by examination associated with type 2 diabetes mellitus (CMS/[facility])    Obstructive lung disease (CMS/[facility])     Other Visit Diagnoses       Shakiness    -  Primary    Muscle spasms of both lower extremities            Reviewed her last labs reviewed each one of her medications    Discussed Taking Torsemide Monday, Wednesday and Friday.    Take Magnesium in the evenings.    She should continue to get the repeat labs previously discussed.     Continue to check sugars as necessary.     She will let us know if she improves over the weekend.     If anything worsens or changes please call us at once, follow up in the office as planned,

## 2023-12-09 PROCEDURE — RXMED WILLOW AMBULATORY MEDICATION CHARGE

## 2023-12-11 DIAGNOSIS — Z79.899 MEDICATION MANAGEMENT: ICD-10-CM

## 2023-12-11 NOTE — TELEPHONE ENCOUNTER
Rx Refill Request Telephone Encounter    Name:  Bing Holliday  :  [id number]  Medication Name:  Eliquis 5 mg  Dose : 2.5 mg  Route : oral  Frequency : 2 times daily  Quantity : 30 tablet  Directions : take 0.5 tablets 2.5 mg by mouth 2 times a day    Allergies: On File    Specific Pharmacy location:  [facility]  Date of last appointment:  2023  Date of next appointment:  none  Best number to reach patient:  [phone]

## 2023-12-12 ENCOUNTER — PATIENT OUTREACH (OUTPATIENT)
Dept: PRIMARY CARE | Facility: CLINIC | Age: 63
End: 2023-12-12
Payer: MEDICARE

## 2023-12-12 DIAGNOSIS — D64.9 ANEMIA, UNSPECIFIED TYPE: ICD-10-CM

## 2023-12-12 DIAGNOSIS — M32.9 LUPUS (MULTI): ICD-10-CM

## 2023-12-12 DIAGNOSIS — E55.9 VITAMIN D DEFICIENCY: ICD-10-CM

## 2023-12-12 DIAGNOSIS — R60.0 BILATERAL LEG EDEMA: ICD-10-CM

## 2023-12-12 PROCEDURE — 99490 CHRNC CARE MGMT STAFF 1ST 20: CPT | Performed by: FAMILY MEDICINE

## 2023-12-12 NOTE — PROGRESS NOTES
Chart review complete.     Monthly outreach complete.    Patient main concerns at this time:  - uncontrolled low glucose in morning: Patient is still experiencing low blood sugars in the morning without any s/s. She states understanding of management of lows and highs. Does state this morning fasting BS was 111. She follows with Dr. Chi. Looking for a second opinion on management with Dr. Morrison in January. Patient is on increased dose of prednisone and still experiencing lows.    - leg shaking in mornings: Patient previously had VV with PCP regarding this issue. States she has had some resolution but its not completely gone. Reminded or labs ordered after that visit for repeat. She states shell go have those done by the end of the month.    Patient denies further needs or concerns at this time. States understanding to call if any needs arise.

## 2023-12-13 ENCOUNTER — PHARMACY VISIT (OUTPATIENT)
Dept: PHARMACY | Facility: CLINIC | Age: 63
End: 2023-12-13
Payer: MEDICARE

## 2023-12-13 ENCOUNTER — TELEPHONE (OUTPATIENT)
Dept: PRIMARY CARE | Facility: CLINIC | Age: 63
End: 2023-12-13
Payer: MEDICARE

## 2023-12-13 DIAGNOSIS — R39.9 UTI SYMPTOMS: ICD-10-CM

## 2023-12-13 LAB — HOLD SPECIMEN: NORMAL

## 2023-12-13 RX ORDER — TORSEMIDE 10 MG/1
10 TABLET ORAL DAILY
Qty: 90 TABLET | Refills: 0 | Status: SHIPPED | OUTPATIENT
Start: 2023-12-13 | End: 2024-01-28 | Stop reason: HOSPADM

## 2023-12-13 NOTE — TELEPHONE ENCOUNTER
Estefany, Nurse from [facility] is calling - reports that Bing has notice a slight amount of blood in the urine for the last 1 1/2 days - wondering if she can run a urine on her?  Please advise.    Estefany's number [phone]

## 2023-12-13 NOTE — TELEPHONE ENCOUNTER
Patient is calling back - asked for this medication to go to [facility] retail pharmacy - needs to be cancelled at the [facility]

## 2023-12-14 ENCOUNTER — LAB (OUTPATIENT)
Dept: LAB | Facility: LAB | Age: 63
End: 2023-12-14
Payer: MEDICARE

## 2023-12-14 DIAGNOSIS — R39.9 UTI SYMPTOMS: ICD-10-CM

## 2023-12-14 DIAGNOSIS — D64.9 ANEMIA, UNSPECIFIED TYPE: ICD-10-CM

## 2023-12-14 DIAGNOSIS — M32.9 LUPUS (MULTI): ICD-10-CM

## 2023-12-14 PROCEDURE — 87186 SC STD MICRODIL/AGAR DIL: CPT

## 2023-12-14 PROCEDURE — 87086 URINE CULTURE/COLONY COUNT: CPT

## 2023-12-14 NOTE — TELEPHONE ENCOUNTER
PATIENT RETURNED CALL - [facility] IS AWARE - CAN YOU ORDER SO IT IS IN THE SYSTEM FOR THEM?  THANK YOU!

## 2023-12-15 DIAGNOSIS — N39.0 URINARY TRACT INFECTION WITHOUT HEMATURIA, SITE UNSPECIFIED: ICD-10-CM

## 2023-12-15 RX ORDER — NITROFURANTOIN 25; 75 MG/1; MG/1
100 CAPSULE ORAL 2 TIMES DAILY
Qty: 20 CAPSULE | Refills: 0 | Status: SHIPPED | OUTPATIENT
Start: 2023-12-15 | End: 2023-12-23 | Stop reason: HOSPADM

## 2023-12-15 NOTE — TELEPHONE ENCOUNTER
----- Message from Claudio Hood DO sent at 12/15/2023 12:51 PM EST -----  Urinary tract culture showing infection can we set up Macrobid twice daily for her for 10 days in order they want it sent to thank you  ----- Message -----  From: Lab, Background User  Sent: 12/15/2023  11:51 AM EST  To: Claudio Hood DO

## 2023-12-16 ENCOUNTER — HOSPITAL ENCOUNTER (INPATIENT)
Facility: HOSPITAL | Age: 63
LOS: 4 days | Discharge: NURSING, REHAB OR LONG-TERM CARE | DRG: 689 | End: 2023-12-23
Attending: EMERGENCY MEDICINE | Admitting: INTERNAL MEDICINE
Payer: MEDICARE

## 2023-12-16 DIAGNOSIS — E87.5 HYPERKALEMIA: Primary | ICD-10-CM

## 2023-12-16 DIAGNOSIS — R53.1 GENERALIZED WEAKNESS: ICD-10-CM

## 2023-12-16 DIAGNOSIS — E08.621: ICD-10-CM

## 2023-12-16 DIAGNOSIS — L97.529: ICD-10-CM

## 2023-12-16 LAB
BACTERIA UR CULT: ABNORMAL
BACTERIA UR CULT: ABNORMAL
GLUCOSE BLD MANUAL STRIP-MCNC: 109 MG/DL (ref 74–99)

## 2023-12-16 PROCEDURE — 96366 THER/PROPH/DIAG IV INF ADDON: CPT

## 2023-12-16 PROCEDURE — 99285 EMERGENCY DEPT VISIT HI MDM: CPT | Performed by: EMERGENCY MEDICINE

## 2023-12-16 PROCEDURE — 96375 TX/PRO/DX INJ NEW DRUG ADDON: CPT

## 2023-12-16 PROCEDURE — 82947 ASSAY GLUCOSE BLOOD QUANT: CPT

## 2023-12-16 SDOH — SOCIAL STABILITY: SOCIAL INSECURITY: HAVE YOU HAD THOUGHTS OF HARMING ANYONE ELSE?: NO

## 2023-12-16 SDOH — SOCIAL STABILITY: SOCIAL INSECURITY: WERE YOU ABLE TO COMPLETE ALL THE BEHAVIORAL HEALTH SCREENINGS?: YES

## 2023-12-16 ASSESSMENT — LIFESTYLE VARIABLES
HOW OFTEN DO YOU HAVE 6 OR MORE DRINKS ON ONE OCCASION: PATIENT DECLINED
AUDIT-C TOTAL SCORE: -1
AUDIT-C TOTAL SCORE: -1
HAVE PEOPLE ANNOYED YOU BY CRITICIZING YOUR DRINKING: NO
HOW OFTEN DO YOU HAVE A DRINK CONTAINING ALCOHOL: PATIENT DECLINED
EVER FELT BAD OR GUILTY ABOUT YOUR DRINKING: NO
HOW MANY STANDARD DRINKS CONTAINING ALCOHOL DO YOU HAVE ON A TYPICAL DAY: PATIENT DECLINED
SKIP TO QUESTIONS 9-10: 0
HAVE YOU EVER FELT YOU SHOULD CUT DOWN ON YOUR DRINKING: NO
EVER HAD A DRINK FIRST THING IN THE MORNING TO STEADY YOUR NERVES TO GET RID OF A HANGOVER: NO
REASON UNABLE TO ASSESS: NO

## 2023-12-16 ASSESSMENT — COLUMBIA-SUICIDE SEVERITY RATING SCALE - C-SSRS
6. HAVE YOU EVER DONE ANYTHING, STARTED TO DO ANYTHING, OR PREPARED TO DO ANYTHING TO END YOUR LIFE?: NO
1. IN THE PAST MONTH, HAVE YOU WISHED YOU WERE DEAD OR WISHED YOU COULD GO TO SLEEP AND NOT WAKE UP?: NO
2. HAVE YOU ACTUALLY HAD ANY THOUGHTS OF KILLING YOURSELF?: NO

## 2023-12-16 ASSESSMENT — PATIENT HEALTH QUESTIONNAIRE - PHQ9
2. FEELING DOWN, DEPRESSED OR HOPELESS: NOT AT ALL
1. LITTLE INTEREST OR PLEASURE IN DOING THINGS: NOT AT ALL
SUM OF ALL RESPONSES TO PHQ9 QUESTIONS 1 & 2: 0

## 2023-12-16 ASSESSMENT — PAIN SCALES - GENERAL: PAINLEVEL_OUTOF10: 0 - NO PAIN

## 2023-12-16 ASSESSMENT — PAIN - FUNCTIONAL ASSESSMENT: PAIN_FUNCTIONAL_ASSESSMENT: 0-10

## 2023-12-17 ENCOUNTER — APPOINTMENT (OUTPATIENT)
Dept: CARDIOLOGY | Facility: HOSPITAL | Age: 63
DRG: 689 | End: 2023-12-17
Payer: MEDICARE

## 2023-12-17 ENCOUNTER — APPOINTMENT (OUTPATIENT)
Dept: RADIOLOGY | Facility: HOSPITAL | Age: 63
DRG: 689 | End: 2023-12-17
Payer: MEDICARE

## 2023-12-17 PROBLEM — E87.5 ACUTE HYPERKALEMIA: Status: ACTIVE | Noted: 2023-12-17

## 2023-12-17 PROBLEM — I77.89: Chronic | Status: ACTIVE | Noted: 2023-04-13

## 2023-12-17 PROBLEM — E87.5 HYPERKALEMIA: Status: ACTIVE | Noted: 2023-12-17

## 2023-12-17 PROBLEM — E11.42 DM TYPE 2 WITH DIABETIC PERIPHERAL NEUROPATHY (MULTI): Chronic | Status: ACTIVE | Noted: 2023-10-30

## 2023-12-17 PROBLEM — N18.31 CKD STAGE G3A/A2, GFR 45-59 AND ALBUMIN CREATININE RATIO 30-299 MG/G (MULTI): Chronic | Status: ACTIVE | Noted: 2023-10-30

## 2023-12-17 PROBLEM — K21.9 GERD (GASTROESOPHAGEAL REFLUX DISEASE): Chronic | Status: ACTIVE | Noted: 2023-10-30

## 2023-12-17 PROBLEM — I10 BENIGN ESSENTIAL HTN: Chronic | Status: ACTIVE | Noted: 2023-10-30

## 2023-12-17 PROBLEM — I82.409 DVT (DEEP VENOUS THROMBOSIS) (MULTI): Chronic | Status: ACTIVE | Noted: 2023-10-30

## 2023-12-17 PROBLEM — M32.9 LUPUS (MULTI): Chronic | Status: ACTIVE | Noted: 2023-03-06

## 2023-12-17 PROBLEM — E78.5 HYPERLIPIDEMIA: Chronic | Status: ACTIVE | Noted: 2023-04-21

## 2023-12-17 PROBLEM — I48.91 ATRIAL FIBRILLATION (MULTI): Chronic | Status: ACTIVE | Noted: 2023-10-30

## 2023-12-17 PROBLEM — I27.20 PULMONARY HYPERTENSION (MULTI): Chronic | Status: ACTIVE | Noted: 2023-07-22

## 2023-12-17 PROBLEM — M32.19: Chronic | Status: ACTIVE | Noted: 2023-04-13

## 2023-12-17 PROBLEM — J44.9 ADVANCED COPD (MULTI): Chronic | Status: ACTIVE | Noted: 2023-10-30

## 2023-12-17 PROBLEM — D64.9 ANEMIA: Chronic | Status: ACTIVE | Noted: 2023-10-30

## 2023-12-17 PROBLEM — L97.529: Chronic | Status: ACTIVE | Noted: 2023-10-30

## 2023-12-17 LAB
ALBUMIN SERPL BCP-MCNC: 3.5 G/DL (ref 3.4–5)
ALBUMIN SERPL BCP-MCNC: 3.6 G/DL (ref 3.4–5)
ALP SERPL-CCNC: 124 U/L (ref 33–136)
ALT SERPL W P-5'-P-CCNC: 34 U/L (ref 7–45)
AMMONIA PLAS-SCNC: 30 UMOL/L (ref 16–53)
ANION GAP SERPL CALC-SCNC: 14 MMOL/L (ref 10–20)
ANION GAP SERPL CALC-SCNC: 16 MMOL/L (ref 10–20)
APPEARANCE UR: ABNORMAL
AST SERPL W P-5'-P-CCNC: 32 U/L (ref 9–39)
BACTERIA #/AREA URNS AUTO: ABNORMAL /HPF
BASOPHILS # BLD MANUAL: 0 X10*3/UL (ref 0–0.1)
BASOPHILS NFR BLD MANUAL: 0 %
BILIRUB SERPL-MCNC: 0.3 MG/DL (ref 0–1.2)
BILIRUB UR STRIP.AUTO-MCNC: NEGATIVE MG/DL
BUN SERPL-MCNC: 56 MG/DL (ref 6–23)
BUN SERPL-MCNC: 57 MG/DL (ref 6–23)
CALCIUM SERPL-MCNC: 9.1 MG/DL (ref 8.6–10.3)
CALCIUM SERPL-MCNC: 9.8 MG/DL (ref 8.6–10.3)
CARDIAC TROPONIN I PNL SERPL HS: 10 NG/L (ref 0–13)
CARDIAC TROPONIN I PNL SERPL HS: 12 NG/L (ref 0–13)
CHLORIDE SERPL-SCNC: 110 MMOL/L (ref 98–107)
CHLORIDE SERPL-SCNC: 110 MMOL/L (ref 98–107)
CO2 SERPL-SCNC: 25 MMOL/L (ref 21–32)
CO2 SERPL-SCNC: 28 MMOL/L (ref 21–32)
COLOR UR: YELLOW
CREAT SERPL-MCNC: 2.03 MG/DL (ref 0.5–1.05)
CREAT SERPL-MCNC: 2.16 MG/DL (ref 0.5–1.05)
CRP SERPL-MCNC: 0.47 MG/DL
EOSINOPHIL # BLD MANUAL: 0 X10*3/UL (ref 0–0.7)
EOSINOPHIL NFR BLD MANUAL: 0 %
ERYTHROCYTE [DISTWIDTH] IN BLOOD BY AUTOMATED COUNT: 21.1 % (ref 11.5–14.5)
ERYTHROCYTE [DISTWIDTH] IN BLOOD BY AUTOMATED COUNT: 21.4 % (ref 11.5–14.5)
ERYTHROCYTE [SEDIMENTATION RATE] IN BLOOD BY WESTERGREN METHOD: 54 MM/H (ref 0–30)
GFR SERPL CREATININE-BSD FRML MDRD: 25 ML/MIN/1.73M*2
GFR SERPL CREATININE-BSD FRML MDRD: 27 ML/MIN/1.73M*2
GLUCOSE BLD MANUAL STRIP-MCNC: 116 MG/DL (ref 74–99)
GLUCOSE BLD MANUAL STRIP-MCNC: 117 MG/DL (ref 74–99)
GLUCOSE BLD MANUAL STRIP-MCNC: 120 MG/DL (ref 74–99)
GLUCOSE BLD MANUAL STRIP-MCNC: 140 MG/DL (ref 74–99)
GLUCOSE BLD MANUAL STRIP-MCNC: 187 MG/DL (ref 74–99)
GLUCOSE SERPL-MCNC: 111 MG/DL (ref 74–99)
GLUCOSE SERPL-MCNC: 138 MG/DL (ref 74–99)
GLUCOSE UR STRIP.AUTO-MCNC: NEGATIVE MG/DL
HCT VFR BLD AUTO: 32.8 % (ref 36–46)
HCT VFR BLD AUTO: 34.9 % (ref 36–46)
HGB BLD-MCNC: 10 G/DL (ref 12–16)
HGB BLD-MCNC: 9.7 G/DL (ref 12–16)
HOLD SPECIMEN: NORMAL
IMM GRANULOCYTES # BLD AUTO: 0.17 X10*3/UL (ref 0–0.7)
IMM GRANULOCYTES NFR BLD AUTO: 2.1 % (ref 0–0.9)
KETONES UR STRIP.AUTO-MCNC: NEGATIVE MG/DL
LEUKOCYTE ESTERASE UR QL STRIP.AUTO: ABNORMAL
LYMPHOCYTES # BLD MANUAL: 0.55 X10*3/UL (ref 1.2–4.8)
LYMPHOCYTES NFR BLD MANUAL: 7 %
MAGNESIUM SERPL-MCNC: 2.73 MG/DL (ref 1.6–2.4)
MCH RBC QN AUTO: 29.3 PG (ref 26–34)
MCH RBC QN AUTO: 29.8 PG (ref 26–34)
MCHC RBC AUTO-ENTMCNC: 28.7 G/DL (ref 32–36)
MCHC RBC AUTO-ENTMCNC: 29.6 G/DL (ref 32–36)
MCV RBC AUTO: 101 FL (ref 80–100)
MCV RBC AUTO: 102 FL (ref 80–100)
METAMYELOCYTES # BLD MANUAL: 0.08 X10*3/UL
METAMYELOCYTES NFR BLD MANUAL: 1 %
MONOCYTES # BLD MANUAL: 0.08 X10*3/UL (ref 0.1–1)
MONOCYTES NFR BLD MANUAL: 1 %
NEUTROPHILS # BLD MANUAL: 7.19 X10*3/UL (ref 1.2–7.7)
NEUTS BAND # BLD MANUAL: 1.26 X10*3/UL (ref 0–0.7)
NEUTS BAND NFR BLD MANUAL: 16 %
NEUTS SEG # BLD MANUAL: 5.93 X10*3/UL (ref 1.2–7)
NEUTS SEG NFR BLD MANUAL: 75 %
NITRITE UR QL STRIP.AUTO: POSITIVE
NRBC BLD-RTO: 1 /100 WBCS (ref 0–0)
NRBC BLD-RTO: 1.2 /100 WBCS (ref 0–0)
PH UR STRIP.AUTO: 5 [PH]
PHOSPHATE SERPL-MCNC: 4 MG/DL (ref 2.5–4.9)
PLATELET # BLD AUTO: 93 X10*3/UL (ref 150–450)
PLATELET # BLD AUTO: 93 X10*3/UL (ref 150–450)
POLYCHROMASIA BLD QL SMEAR: ABNORMAL
POTASSIUM SERPL-SCNC: 5.3 MMOL/L (ref 3.5–5.3)
POTASSIUM SERPL-SCNC: 6.3 MMOL/L (ref 3.5–5.3)
PROT SERPL-MCNC: 6.3 G/DL (ref 6.4–8.2)
PROT UR STRIP.AUTO-MCNC: ABNORMAL MG/DL
RBC # BLD AUTO: 3.26 X10*6/UL (ref 4–5.2)
RBC # BLD AUTO: 3.41 X10*6/UL (ref 4–5.2)
RBC # UR STRIP.AUTO: ABNORMAL /UL
RBC #/AREA URNS AUTO: ABNORMAL /HPF
RBC MORPH BLD: ABNORMAL
SODIUM SERPL-SCNC: 145 MMOL/L (ref 136–145)
SODIUM SERPL-SCNC: 147 MMOL/L (ref 136–145)
SP GR UR STRIP.AUTO: 1.01
TOTAL CELLS COUNTED BLD: 100
TSH SERPL-ACNC: 1.53 MIU/L (ref 0.44–3.98)
UROBILINOGEN UR STRIP.AUTO-MCNC: <2 MG/DL
WBC # BLD AUTO: 6.9 X10*3/UL (ref 4.4–11.3)
WBC # BLD AUTO: 7.9 X10*3/UL (ref 4.4–11.3)
WBC #/AREA URNS AUTO: ABNORMAL /HPF

## 2023-12-17 PROCEDURE — 73620 X-RAY EXAM OF FOOT: CPT | Mod: LEFT SIDE | Performed by: STUDENT IN AN ORGANIZED HEALTH CARE EDUCATION/TRAINING PROGRAM

## 2023-12-17 PROCEDURE — 84443 ASSAY THYROID STIM HORMONE: CPT

## 2023-12-17 PROCEDURE — 71045 X-RAY EXAM CHEST 1 VIEW: CPT

## 2023-12-17 PROCEDURE — 2500000004 HC RX 250 GENERAL PHARMACY W/ HCPCS (ALT 636 FOR OP/ED): Performed by: EMERGENCY MEDICINE

## 2023-12-17 PROCEDURE — G0378 HOSPITAL OBSERVATION PER HR: HCPCS

## 2023-12-17 PROCEDURE — 85027 COMPLETE CBC AUTOMATED: CPT

## 2023-12-17 PROCEDURE — 93005 ELECTROCARDIOGRAM TRACING: CPT

## 2023-12-17 PROCEDURE — 70450 CT HEAD/BRAIN W/O DYE: CPT | Performed by: STUDENT IN AN ORGANIZED HEALTH CARE EDUCATION/TRAINING PROGRAM

## 2023-12-17 PROCEDURE — 2500000001 HC RX 250 WO HCPCS SELF ADMINISTERED DRUGS (ALT 637 FOR MEDICARE OP): Performed by: EMERGENCY MEDICINE

## 2023-12-17 PROCEDURE — 2500000001 HC RX 250 WO HCPCS SELF ADMINISTERED DRUGS (ALT 637 FOR MEDICARE OP)

## 2023-12-17 PROCEDURE — 2500000004 HC RX 250 GENERAL PHARMACY W/ HCPCS (ALT 636 FOR OP/ED)

## 2023-12-17 PROCEDURE — 93010 ELECTROCARDIOGRAM REPORT: CPT | Performed by: INTERNAL MEDICINE

## 2023-12-17 PROCEDURE — 82140 ASSAY OF AMMONIA: CPT | Performed by: EMERGENCY MEDICINE

## 2023-12-17 PROCEDURE — 99223 1ST HOSP IP/OBS HIGH 75: CPT

## 2023-12-17 PROCEDURE — 82947 ASSAY GLUCOSE BLOOD QUANT: CPT

## 2023-12-17 PROCEDURE — 80069 RENAL FUNCTION PANEL: CPT | Mod: CCI

## 2023-12-17 PROCEDURE — 86140 C-REACTIVE PROTEIN: CPT

## 2023-12-17 PROCEDURE — 70450 CT HEAD/BRAIN W/O DYE: CPT

## 2023-12-17 PROCEDURE — 80053 COMPREHEN METABOLIC PANEL: CPT | Performed by: EMERGENCY MEDICINE

## 2023-12-17 PROCEDURE — 81001 URINALYSIS AUTO W/SCOPE: CPT

## 2023-12-17 PROCEDURE — S0073 INJECTION, AZTREONAM, 500 MG: HCPCS | Performed by: EMERGENCY MEDICINE

## 2023-12-17 PROCEDURE — 84484 ASSAY OF TROPONIN QUANT: CPT

## 2023-12-17 PROCEDURE — 83735 ASSAY OF MAGNESIUM: CPT

## 2023-12-17 PROCEDURE — 36415 COLL VENOUS BLD VENIPUNCTURE: CPT

## 2023-12-17 PROCEDURE — 85007 BL SMEAR W/DIFF WBC COUNT: CPT

## 2023-12-17 PROCEDURE — 71045 X-RAY EXAM CHEST 1 VIEW: CPT | Performed by: STUDENT IN AN ORGANIZED HEALTH CARE EDUCATION/TRAINING PROGRAM

## 2023-12-17 PROCEDURE — 85652 RBC SED RATE AUTOMATED: CPT

## 2023-12-17 PROCEDURE — 94640 AIRWAY INHALATION TREATMENT: CPT

## 2023-12-17 PROCEDURE — 73620 X-RAY EXAM OF FOOT: CPT | Mod: LT,FY

## 2023-12-17 PROCEDURE — 2500000002 HC RX 250 W HCPCS SELF ADMINISTERED DRUGS (ALT 637 FOR MEDICARE OP, ALT 636 FOR OP/ED)

## 2023-12-17 PROCEDURE — 87086 URINE CULTURE/COLONY COUNT: CPT | Mod: STJLAB

## 2023-12-17 RX ORDER — INSULIN LISPRO 100 [IU]/ML
0-5 INJECTION, SOLUTION INTRAVENOUS; SUBCUTANEOUS
Status: DISCONTINUED | OUTPATIENT
Start: 2023-12-17 | End: 2023-12-17

## 2023-12-17 RX ORDER — ACETAMINOPHEN 325 MG/1
650 TABLET ORAL EVERY 4 HOURS PRN
Status: DISCONTINUED | OUTPATIENT
Start: 2023-12-17 | End: 2023-12-23 | Stop reason: HOSPADM

## 2023-12-17 RX ORDER — ALBUTEROL SULFATE 0.83 MG/ML
5 SOLUTION RESPIRATORY (INHALATION) ONCE
Status: DISCONTINUED | OUTPATIENT
Start: 2023-12-17 | End: 2023-12-17

## 2023-12-17 RX ORDER — DEXTROSE 50 % IN WATER (D50W) INTRAVENOUS SYRINGE
25
Status: DISCONTINUED | OUTPATIENT
Start: 2023-12-17 | End: 2023-12-23 | Stop reason: HOSPADM

## 2023-12-17 RX ORDER — MYCOPHENOLATE MOFETIL 250 MG/1
1000 CAPSULE ORAL 2 TIMES DAILY
Status: DISCONTINUED | OUTPATIENT
Start: 2023-12-17 | End: 2023-12-23 | Stop reason: HOSPADM

## 2023-12-17 RX ORDER — CEFTRIAXONE 1 G/50ML
1 INJECTION, SOLUTION INTRAVENOUS DAILY
Status: DISCONTINUED | OUTPATIENT
Start: 2023-12-17 | End: 2023-12-23 | Stop reason: HOSPADM

## 2023-12-17 RX ORDER — PREDNISONE 5 MG/1
5 TABLET ORAL SEE ADMIN INSTRUCTIONS
Status: DISCONTINUED | OUTPATIENT
Start: 2023-12-17 | End: 2023-12-20

## 2023-12-17 RX ORDER — PANTOPRAZOLE SODIUM 40 MG/1
40 TABLET, DELAYED RELEASE ORAL DAILY
Status: DISCONTINUED | OUTPATIENT
Start: 2023-12-17 | End: 2023-12-23 | Stop reason: HOSPADM

## 2023-12-17 RX ORDER — POLYETHYLENE GLYCOL 3350 17 G/17G
17 POWDER, FOR SOLUTION ORAL DAILY
Status: DISCONTINUED | OUTPATIENT
Start: 2023-12-17 | End: 2023-12-23 | Stop reason: HOSPADM

## 2023-12-17 RX ORDER — AMLODIPINE BESYLATE 2.5 MG/1
2.5 TABLET ORAL DAILY
Status: DISCONTINUED | OUTPATIENT
Start: 2023-12-17 | End: 2023-12-23 | Stop reason: HOSPADM

## 2023-12-17 RX ORDER — TORSEMIDE 20 MG/1
10 TABLET ORAL
Status: DISCONTINUED | OUTPATIENT
Start: 2023-12-18 | End: 2023-12-23 | Stop reason: HOSPADM

## 2023-12-17 RX ORDER — MYCOPHENOLATE MOFETIL 500 MG/1
1000 TABLET ORAL 2 TIMES DAILY
Status: DISCONTINUED | OUTPATIENT
Start: 2023-12-17 | End: 2023-12-17

## 2023-12-17 RX ORDER — DEXTROSE MONOHYDRATE AND SODIUM CHLORIDE 5; .45 G/100ML; G/100ML
250 INJECTION, SOLUTION INTRAVENOUS CONTINUOUS
Status: DISCONTINUED | OUTPATIENT
Start: 2023-12-17 | End: 2023-12-18

## 2023-12-17 RX ORDER — PREDNISONE 10 MG/1
10 TABLET ORAL EVERY MORNING
Status: DISCONTINUED | OUTPATIENT
Start: 2023-12-17 | End: 2023-12-20

## 2023-12-17 RX ORDER — ACETAMINOPHEN 500 MG
5 TABLET ORAL NIGHTLY
Status: DISCONTINUED | OUTPATIENT
Start: 2023-12-17 | End: 2023-12-23 | Stop reason: HOSPADM

## 2023-12-17 RX ORDER — ATORVASTATIN CALCIUM 40 MG/1
40 TABLET, FILM COATED ORAL DAILY
Status: DISCONTINUED | OUTPATIENT
Start: 2023-12-17 | End: 2023-12-23 | Stop reason: HOSPADM

## 2023-12-17 RX ORDER — DEXTROSE MONOHYDRATE 100 MG/ML
0.3 INJECTION, SOLUTION INTRAVENOUS ONCE AS NEEDED
Status: DISCONTINUED | OUTPATIENT
Start: 2023-12-17 | End: 2023-12-23 | Stop reason: HOSPADM

## 2023-12-17 RX ORDER — FLUTICASONE FUROATE AND VILANTEROL 100; 25 UG/1; UG/1
1 POWDER RESPIRATORY (INHALATION)
Status: DISCONTINUED | OUTPATIENT
Start: 2023-12-17 | End: 2023-12-17

## 2023-12-17 RX ORDER — IPRATROPIUM BROMIDE AND ALBUTEROL SULFATE 2.5; .5 MG/3ML; MG/3ML
3 SOLUTION RESPIRATORY (INHALATION)
Status: DISCONTINUED | OUTPATIENT
Start: 2023-12-17 | End: 2023-12-18

## 2023-12-17 RX ORDER — LANOLIN ALCOHOL/MO/W.PET/CERES
400 CREAM (GRAM) TOPICAL DAILY
Status: DISCONTINUED | OUTPATIENT
Start: 2023-12-17 | End: 2023-12-23 | Stop reason: HOSPADM

## 2023-12-17 RX ORDER — FORMOTEROL FUMARATE DIHYDRATE 20 UG/2ML
20 SOLUTION RESPIRATORY (INHALATION)
Status: DISCONTINUED | OUTPATIENT
Start: 2023-12-17 | End: 2023-12-23 | Stop reason: HOSPADM

## 2023-12-17 RX ORDER — SODIUM POLYSTYRENE SULFONATE 15 G/60ML
15 SUSPENSION ORAL; RECTAL ONCE
Status: COMPLETED | OUTPATIENT
Start: 2023-12-17 | End: 2023-12-17

## 2023-12-17 RX ORDER — BUDESONIDE 0.5 MG/2ML
0.5 INHALANT ORAL
Status: DISCONTINUED | OUTPATIENT
Start: 2023-12-17 | End: 2023-12-23 | Stop reason: HOSPADM

## 2023-12-17 RX ORDER — CALCIUM GLUCONATE 20 MG/ML
2 INJECTION, SOLUTION INTRAVENOUS ONCE
Status: COMPLETED | OUTPATIENT
Start: 2023-12-17 | End: 2023-12-17

## 2023-12-17 RX ADMIN — CEFTRIAXONE SODIUM 1 G: 1 INJECTION, SOLUTION INTRAVENOUS at 17:42

## 2023-12-17 RX ADMIN — SODIUM CHLORIDE, POTASSIUM CHLORIDE, SODIUM LACTATE AND CALCIUM CHLORIDE 1000 ML: 600; 310; 30; 20 INJECTION, SOLUTION INTRAVENOUS at 10:50

## 2023-12-17 RX ADMIN — SODIUM POLYSTYRENE SULFONATE 15 G: 15 SUSPENSION ORAL; RECTAL at 03:42

## 2023-12-17 RX ADMIN — CALCIUM GLUCONATE 2 G: 20 INJECTION, SOLUTION INTRAVENOUS at 03:42

## 2023-12-17 RX ADMIN — MYCOPHENOLATE MOFETIL 1000 MG: 250 CAPSULE ORAL at 22:55

## 2023-12-17 RX ADMIN — BUDESONIDE INHALATION 0.5 MG: 0.5 SUSPENSION RESPIRATORY (INHALATION) at 19:54

## 2023-12-17 RX ADMIN — PANTOPRAZOLE SODIUM 40 MG: 40 TABLET, DELAYED RELEASE ORAL at 14:41

## 2023-12-17 RX ADMIN — FORMOTEROL FUMARATE 20 MCG: 20 SOLUTION RESPIRATORY (INHALATION) at 19:54

## 2023-12-17 RX ADMIN — DEXTROSE AND SODIUM CHLORIDE 250 ML/HR: 5; 450 INJECTION, SOLUTION INTRAVENOUS at 14:05

## 2023-12-17 RX ADMIN — PREDNISONE 10 MG: 5 TABLET ORAL at 14:41

## 2023-12-17 RX ADMIN — APIXABAN 2.5 MG: 2.5 TABLET, FILM COATED ORAL at 09:37

## 2023-12-17 RX ADMIN — IPRATROPIUM BROMIDE AND ALBUTEROL SULFATE 3 ML: 2.5; .5 SOLUTION RESPIRATORY (INHALATION) at 19:54

## 2023-12-17 RX ADMIN — Medication 5 MG: at 22:54

## 2023-12-17 RX ADMIN — AMLODIPINE BESYLATE 2.5 MG: 2.5 TABLET ORAL at 14:41

## 2023-12-17 RX ADMIN — SODIUM ZIRCONIUM CYCLOSILICATE 10 G: 10 POWDER, FOR SUSPENSION ORAL at 04:19

## 2023-12-17 RX ADMIN — AZTREONAM 1 G: 1 INJECTION, POWDER, LYOPHILIZED, FOR SOLUTION INTRAMUSCULAR; INTRAVENOUS at 05:06

## 2023-12-17 RX ADMIN — APIXABAN 2.5 MG: 2.5 TABLET, FILM COATED ORAL at 22:54

## 2023-12-17 RX ADMIN — MYCOPHENOLATE MOFETIL 1000 MG: 250 CAPSULE ORAL at 15:14

## 2023-12-17 RX ADMIN — ATORVASTATIN CALCIUM 40 MG: 40 TABLET, FILM COATED ORAL at 14:41

## 2023-12-17 ASSESSMENT — ENCOUNTER SYMPTOMS
CONSTITUTIONAL NEGATIVE: 1
WOUND: 1
HEMATOLOGIC/LYMPHATIC NEGATIVE: 1
MUSCULOSKELETAL NEGATIVE: 1
EYES NEGATIVE: 1
GASTROINTESTINAL NEGATIVE: 1
CARDIOVASCULAR NEGATIVE: 1
COLOR CHANGE: 0
NEUROLOGICAL NEGATIVE: 1
RESPIRATORY NEGATIVE: 1
ENDOCRINE NEGATIVE: 1

## 2023-12-17 ASSESSMENT — PAIN - FUNCTIONAL ASSESSMENT
PAIN_FUNCTIONAL_ASSESSMENT: 0-10

## 2023-12-17 ASSESSMENT — COGNITIVE AND FUNCTIONAL STATUS - GENERAL
EATING MEALS: A LITTLE
DRESSING REGULAR LOWER BODY CLOTHING: A LOT
PERSONAL GROOMING: A LOT
MOBILITY SCORE: 13
MOVING TO AND FROM BED TO CHAIR: A LOT
PATIENT BASELINE BEDBOUND: UNABLE TO ASSESS AT THIS TIME
WALKING IN HOSPITAL ROOM: A LOT
STANDING UP FROM CHAIR USING ARMS: A LOT
TOILETING: A LOT
HELP NEEDED FOR BATHING: A LOT
MOVING FROM LYING ON BACK TO SITTING ON SIDE OF FLAT BED WITH BEDRAILS: A LITTLE
TURNING FROM BACK TO SIDE WHILE IN FLAT BAD: A LOT
CLIMB 3 TO 5 STEPS WITH RAILING: A LOT
DAILY ACTIVITIY SCORE: 13
DRESSING REGULAR UPPER BODY CLOTHING: A LOT

## 2023-12-17 ASSESSMENT — PAIN SCALES - GENERAL
PAINLEVEL_OUTOF10: 0 - NO PAIN

## 2023-12-17 ASSESSMENT — COLUMBIA-SUICIDE SEVERITY RATING SCALE - C-SSRS
2. HAVE YOU ACTUALLY HAD ANY THOUGHTS OF KILLING YOURSELF?: NO
1. IN THE PAST MONTH, HAVE YOU WISHED YOU WERE DEAD OR WISHED YOU COULD GO TO SLEEP AND NOT WAKE UP?: NO
6. HAVE YOU EVER DONE ANYTHING, STARTED TO DO ANYTHING, OR PREPARED TO DO ANYTHING TO END YOUR LIFE?: NO

## 2023-12-17 ASSESSMENT — ACTIVITIES OF DAILY LIVING (ADL): LACK_OF_TRANSPORTATION: PATIENT DECLINED

## 2023-12-17 NOTE — H&P
History Of Present Illness  Bing Holliday is a 63 y.o. 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  Patient presented to the [facility] ED on 12/17/2023 after she was found to be hypoglycemic with tremors at nursing home with glucose of 68.  At the nursing home patient was given peanut butter which increased glucose to 170s.  Patient denied any SOB, CP, palpitations, N/V/D, altered mental status, lightheadedness, dizziness, fatigue.  Patient stated that if she was not aware of low blood sugar she would not have come to the hospital and stated that she feels in her normal baseline state.    In the ED:  -Vitals: Temp 96.8 F, HR 58, RR 20, /82, SpO2 94% ORA  -Labs: CMP: Glucose 138, potassium 6.3, chloride 110, BUN/CR 57/2.16, troponin 10-12             CBC: Hemoglobin 10.0, , PLT 93  -Imaging: EKG: Sinus bradycardia rate 48, QTc 402, no evidence of peaked T waves                  XR left foot: Extensive degenerative changes, with chronic mild tarsal osseous fusion.  No radiographic evidence of osseous erosion or abnormal periosteal reaction.  Nonspecific diffuse soft tissue swelling.  Similar to prior                  CXR: No evidence of acute cardiopulmonary process.  Mild cardiomegaly and extensive coronary artery atherosclerotic calcifications                  CT head without contrast: Unchanged chronic left occipital infarct as well as mild chronic small vascular ischemic changes.  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), paroxysmal afib (on Elqiuis), HTN, COPD, GERD  Allergies: ACE inhibitors, hydroxychloroquine, lisinopril, penicillins, sulfa  FH: Noncontributory  SxH: Ankle surgery, cholecystectomy, pericardial window, eye  surgery, foot surgery, hip replacement, quadricepsplasty  SH: Former smoker, social alcohol use, no illicit drug use    CODE STATUS: DNR/DNI     Past Medical History  Past Medical History:   Diagnosis Date    Acute upper respiratory infection, unspecified 03/04/2020    Acute URI    Acute upper respiratory infection, unspecified 09/30/2015    URTI (acute upper respiratory infection)    Arthritis     Body mass index (BMI) 23.0-23.9, adult 10/15/2021    BMI 23.0-23.9, adult    Body mass index (BMI) 33.0-33.9, adult 03/04/2020    BMI 33.0-33.9,adult    Cardiomegaly 08/27/2013    Left ventricular hypertrophy    Chronic kidney disease, stage 3 unspecified (CMS/HCC) 07/02/2013    Chronic kidney disease, stage III (moderate)    Disease of pericardium, unspecified 07/02/2013    Pericardial disease    Encounter for follow-up examination after completed treatment for conditions other than malignant neoplasm 10/06/2022    Hospital discharge follow-up    Generalized contraction of visual field, right eye 01/29/2015    Generalized contraction of visual field of right eye    Homonymous bilateral field defects, right side 04/29/2016    Homonymous bilateral field defects of right side    Hypertensive chronic kidney disease with stage 1 through stage 4 chronic kidney disease, or unspecified chronic kidney disease 07/02/2013    Nephrosclerosis    Laceration without foreign body, left foot, initial encounter 07/03/2018    Foot laceration, left, initial encounter    Migraine with aura, not intractable, without status migrainosus 10/24/2022    Ocular migraine    Other conditions influencing health status 07/02/2013    Chronic Glomerulonephritis In Diseases Classified Elsewhere    Other conditions influencing health status 07/02/2013    Progressive Familial Myoclonic Epilepsy    Other conditions influencing health status 07/02/2013    Protein S Deficiency    Other conditions influencing health status 05/22/2015    Familial Combined  Hyperlipidemia    Other conditions influencing health status 10/24/2022    IDDM (insulin dependent diabetes mellitus)    Other conditions influencing health status 03/14/2022    Diabetes mellitus, insulin dependent (IDDM), uncontrolled    Other long term (current) drug therapy 10/24/2022    Long-term use of Plaquenil    Personal history of diseases of the blood and blood-forming organs and certain disorders involving the immune mechanism 07/02/2013    History of thrombocytopenia    Personal history of diseases of the skin and subcutaneous tissue 08/11/2015    History of foot ulcer    Personal history of nephrotic syndrome 07/02/2013    History of nephrotic syndrome    Personal history of other diseases of the circulatory system 08/27/2013    History of sinus tachycardia    Personal history of other diseases of the nervous system and sense organs     History of cataract    Personal history of other diseases of the respiratory system     History of bronchitis    Personal history of other infectious and parasitic diseases 07/02/2013    History of hepatitis    Personal history of other specified conditions     History of shortness of breath    Personal history of other specified conditions 08/27/2013    History of edema    Puckering of macula, right eye 10/24/2022    ERM OD (epiretinal membrane, right eye)    Raynaud's syndrome without gangrene 07/02/2013    Raynaud's disease    Systemic lupus erythematosus, unspecified (CMS/[facility]) 07/24/2015    SLE (systemic lupus erythematosus)    Systemic lupus erythematosus, unspecified (CMS/[facility]) 07/24/2015    SLE (systemic lupus erythematosus)    Systemic lupus erythematosus, unspecified (CMS/[facility]) 07/24/2015    Systemic lupus    Type 2 diabetes mellitus with diabetic nephropathy (CMS/[facility]) 07/02/2013    Type 2 diabetes with nephropathy    Type 2 diabetes mellitus with mild nonproliferative diabetic retinopathy without macular edema, left eye (CMS/[facility]) 07/27/2015    Non-proliferative  diabetic retinopathy, left eye    Type 2 diabetes mellitus with mild nonproliferative diabetic retinopathy without macular edema, unspecified eye (CMS/[facility]) 07/24/2015    Mild non proliferative diabetic retinopathy    Type 2 diabetes mellitus with proliferative diabetic retinopathy without macular edema, right eye (CMS/[facility]) 07/27/2015    Proliferative diabetic retinopathy of right eye    Type 2 diabetes mellitus with proliferative diabetic retinopathy without macular edema, unspecified eye (CMS/[facility]) 07/24/2015    Diabetic proliferative retinopathy    Unspecified acute and subacute iridocyclitis 07/24/2015    Acute iritis, right eye    Unspecified open wound, left foot, sequela 07/03/2018    Wound, open, foot, left, sequela       Surgical History  Past Surgical History:   Procedure Laterality Date    ANKLE SURGERY  01/29/2015    Ankle Surgery    CHOLECYSTECTOMY  01/29/2015    Cholecystectomy    CT GUIDED PERCUTANEOUS BIOPSY BONE DEEP  5/4/2021    CT GUIDED PERCUTANEOUS BIOPSY BONE DEEP 5/4/2021 [facility] CLINICAL LEGACY    EYE SURGERY  03/06/2015    Eye Surgery    FOOT SURGERY  01/29/2015    Foot Surgery    MR HEAD ANGIO WO IV CONTRAST  7/26/2013    MR HEAD ANGIO WO IV CONTRAST 7/26/2013 [facility] CLINICAL LEGACY    MR HEAD ANGIO WO IV CONTRAST  9/17/2021    MR HEAD ANGIO WO IV CONTRAST 9/17/2021 [facility]    MR HEAD ANGIO WO IV CONTRAST  3/25/2023    MR HEAD ANGIO WO IV CONTRAST [facility]    MR NECK ANGIO WO IV CONTRAST  7/26/2013    MR NECK ANGIO WO IV CONTRAST 7/26/2013 [facility] CLINICAL LEGACY    MR NECK ANGIO WO IV CONTRAST  9/17/2021    MR NECK ANGIO WO IV CONTRAST 9/17/2021 [facility]    MR NECK ANGIO WO IV CONTRAST  3/25/2023    MR NECK ANGIO WO IV CONTRAST [facility]    OTHER SURGICAL HISTORY  01/29/2015    Creation Of Pericardial Window    OTHER SURGICAL HISTORY  01/29/2015    Quadricepsplasty    TOTAL HIP ARTHROPLASTY  01/29/2015    Hip Replacement        Social History  She reports that she has never  smoked. She has never used smokeless tobacco. She reports that she does not drink alcohol and does not use drugs.    Family History  Family History   Problem Relation Name Age of Onset    Other (RENAL DISEASE) Mother          END STAGE    Other (CARDIAC DISORDER) Mother      Cataracts Mother      Stroke Mother      Diabetes Mother      Kidney disease Mother      Lupus Mother      Other (CARDIAC DISORDER) Father      COPD Father      Glaucoma Father      Hypertension Father      Sleep apnea Father      Other (CARDIAC DISORDER) Sister      Depression Sister      Kidney disease Sister      Sickle cell trait Sister      Sleep apnea Daughter          Allergies  Ace inhibitors, Hydroxychloroquine, Lisinopril, Penicillins, and Sulfa (sulfonamide antibiotics)    Review of Systems   Constitutional: Negative.    HENT: Negative.     Eyes: Negative.    Respiratory: Negative.     Cardiovascular: Negative.    Gastrointestinal: Negative.    Endocrine: Negative.    Genitourinary: Negative.    Musculoskeletal: Negative.    Skin:  Positive for wound (Diabetic foot ulcer on left lower extremity, chronic). Negative for color change, pallor and rash.   Neurological: Negative.    Hematological: Negative.         Physical Exam  General: Not in acute distress, A&O x 3, alert, cooperative, well-developed  HEENT: Normocephalic, atraumatic, EOMI, moist mucous membranes  Neck: Neck supple, trachea midline, no evidence of trauma  Cardiovascular: RRR, S1 and S2 appreciated, no murmurs rubs gallops appreciated, distal pulses 2+ bilaterally (radial and dorsalis pedis)  Respiratory: Vesicular breath sounds appreciated bilaterally, no adventitious sounds appreciated, no increased work of breathing on room air  GI: Abdomen soft, nondistended, nontender to palpation, bowel sounds present  Extremities: No edema appreciated in lower extremities bilaterally, no cyanosis  Left foot: Diabetic foot ulcer on the lateral plantar aspect of the foot.  Measuring  "approximately 2 x 2 cm.  Showing no signs of infection/osteo such as: No exposed bone, nontender to palpation, without erythema, without fluctuance, without induration, without drainage or purulence.  Neuro: A&O X3, no focal deficits, strength and sensation intact bilaterally  Skin: Warm and dry, without lesions or rashes    Last Recorded Vitals  Blood pressure 152/82, pulse 58, temperature 36 °C (96.8 °F), resp. rate 20, height 1.702 m (5' 7\"), weight 105 kg (231 lb 7.7 oz), SpO2 94 %.    Relevant Results  Scheduled medications  albuterol, 5 mg, nebulization, Once  aztreonam, 1 g, intravenous, Once  calcium gluconate, 2 g, intravenous, Once  insulin lispro, 0-5 Units, subcutaneous, TID with meals  sodium polystyrene, 15 g, oral, Once  sodium polystyrene sulf-sorbtL, 30 g, rectal, Once  sodium zirconium cyclosilicate, 10 g, oral, Once  vancomycin, 1,500 mg, intravenous, Once    PRN medications  PRN medications: dextrose 10 % in water (D10W), dextrose, glucagon  CT head wo IV contrast    Result Date: 12/17/2023  Interpreted By:  Tr Colunga, STUDY: CT HEAD WO IV CONTRAST;  12/17/2023 1:12 am   INDICATION: Signs/Symptoms:confusion, weakness.   COMPARISON: Head CT dated 10/19/2022.   ACCESSION NUMBER(S): [id number]   ORDERING CLINICIAN: DHARA CHAO   TECHNIQUE: Axial noncontrast CT images of the head.   FINDINGS: Gray-white matter differentiation is maintained. There are no extra-axial collections. No mass effect or midline shift. There is no hydrocephalus. There is mild cerebral involutional change. There is a chronic left occipital infarct. There are chronic bilateral basal ganglia lacunar infarcts and scattered periventricular and deep white matter hypodensities suggestive of mild chronic microvascular ischemic change.   HEMORRHAGE: No acute intracranial hemorrhage.   CALVARIUM: No depressed skull fracture.   EXTRACRANIAL SOFT TISSUES:.. Unremarkable.   PARANASAL SINUSES/MASTOIDS: The visualized paranasal " sinuses are well aerated. Mastoid air cells are clear.   ORBITS: Grossly normal.       Unchanged chronic left occipital infarct as well as mild chronic small-vessel ischemic changes.     Signed by: Tr Colunga 12/17/2023 1:32 AM Dictation workstation:   [id number]    XR foot left 1-2 views    Result Date: 12/17/2023  Interpreted By:  Tr Colunga, STUDY: XR FOOT LEFT 1-2 VIEWS; ;  12/17/2023 1:07 am   INDICATION: Signs/Symptoms:diabetic foot ulcer.   COMPARISON: Left foot radiographs dated 07/12/2018.   ACCESSION NUMBER(S): [id number]   ORDERING CLINICIAN: DHARA CHAO   FINDINGS: No evidence of acute fracture or dislocation. Chronic deformity and ghost tracks within the distal tibia and fibula related to previous fracture. There is similar appearance of 1st metatarsophalangeal hallux valgus deformity and 2nd through 5th hammertoe deformities, which somewhat limits evaluation for focal abnormalities. There is extensive mid foot arthrosis with osseous fusion of the mid tarsal bones similar to prior..   There is diffuse edema around the left foot, without clear evidence of abnormal periosteal reaction or erosion.       Extensive degenerative changes in the left foot with chronic mid tarsal osseous fusion. No radiographic evidence of osseous erosion or abnormal periosteal reaction. Nonspecific diffuse soft tissue swelling, similar to prior.     MACRO: None   Signed by: Tr Colunga 12/17/2023 1:29 AM Dictation workstation:   [id number]    XR chest 1 view    Result Date: 12/17/2023  Interpreted By:  Tr Colunga, STUDY: XR CHEST 1 VIEW;  12/17/2023 1:07 am   INDICATION: Signs/Symptoms:sob.   COMPARISON: Correlation made with CT abdomen dated 11/20/2022.   ACCESSION NUMBER(S): [id number]   ORDERING CLINICIAN: DANIELA MULLEN   FINDINGS:   CARDIOMEDIASTINAL SILHOUETTE: Cardiomediastinal silhouette is mildly enlarged taking into account portable technique. Heavy coronary artery atherosclerotic calcifications  better visualized on the CT scan.   LUNGS/PLEURA: There are no consolidations.There are no pleural effusions. There is no demonstrated pneumothorax.     BONES: No evidence of acute osseous abnormality.       1.  No evidence of acute cardiopulmonary process. Mild cardiomegaly and extensive coronary artery atherosclerotic calcifications.     Signed by: Tr Colunga 12/17/2023 1:25 AM Dictation workstation:   [id number]    Electrocardiogram, 12-lead PRN ACS symptoms    Result Date: 11/30/2023  Normal sinus rhythm Possible Left atrial enlargement Left ventricular hypertrophy Nonspecific T wave abnormality Abnormal ECG When compared with ECG of 20-NOV-2023 13:20, (unconfirmed) Nonspecific T wave abnormality has replaced inverted T waves in Lateral leads Confirmed by Caterina Dixon ([id number]) on 11/30/2023 10:58:34 PM    ECG 12 lead    Result Date: 11/30/2023  Normal sinus rhythm Possible Left atrial enlargement Left ventricular hypertrophy Cannot rule out Septal infarct , age undetermined ST & T wave abnormality, consider lateral ischemia Abnormal ECG When compared with ECG of 19-OCT-2023 16:04, T wave inversion now evident in Lateral leads Confirmed by Caterina Dixon ([id number]) on 11/30/2023 10:55:06 PM    CT abdomen pelvis wo IV contrast    Result Date: 11/20/2023  Interpreted By:  Deacon Munoz, STUDY: CT ABDOMEN PELVIS WO IV CONTRAST;  11/20/2023 10:25 pm   INDICATION: Signs/Symptoms:epigastric/periumbilical abd pain w/ diarrhea.   COMPARISON: 8/28/2023   ACCESSION NUMBER(S): [id number]   ORDERING CLINICIAN: ARELIS VILLAGRAN   TECHNIQUE: Contiguous axial images of the abdomen and pelvis were obtained without intravenous contrast. Coronal and sagittal reformatted images were obtained from the axial images.   FINDINGS: There is limited evaluation of the lung bases. Mild basilar subsegmental atelectasis.   Cardiomegaly and coronary artery atherosclerotic calcifications.   Evaluation of the abdominal viscera is limited  secondary to lack of intravenous contrast. Limited evaluation for liver mass on noncontrast examination. The gallbladder surgically absent.   The pancreas, spleen, and adrenal glands appear unremarkable.   Evaluation of the kidneys is limited secondary to lack of intravenous contrast. 2 mm nonobstructive right renal calculus. No hydronephrosis. Right renal vascular calcifications.   Atherosclerotic calcification of the abdominal aorta and bilateral iliac arteries.   No evidence of bowel obstruction or acute appendicitis. There is colonic diverticulosis without evidence of acute diverticulitis. There is underdistention versus wall thickening of the ascending colon. The colon is diffusely underdistended and not well evaluated.   Limited evaluation of the urinary bladder is underdistention and beam hardening artifact.   Postsurgical change of right hip arthroplasty resulting in beam hardening artifact and limiting evaluation of the pelvis. There is stable advanced left hip osteoarthrosis with joint space loss and subchondral sclerosis and cystic change and femoral head neck junction osseous spurring.   Multilevel degenerative change of the lumbar spine. There is stable multilevel degenerative endplate change. Stable mild compression deformity of L1.       No evidence of bowel obstruction or acute appendicitis. Colon is diffusely underdistended and not well evaluated. There is underdistention versus wall thickening of the ascending colon which may be infectious or inflammatory in etiology. Colonic diverticulosis without evidence of acute diverticulitis.   Limited evaluation of the urinary bladder secondary to underdistention and beam hardening artifact. Underdistention versus mild urinary bladder wall thickening; please correlate with urinalysis to exclude cystitis.   MACRO: None   Signed by: Deacon Munoz 11/20/2023 11:26 PM Dictation workstation:   [id number]    XR chest 1 view    Result Date: 11/20/2023  STUDY:  Chest Radiograph;  11/20/2023 3:08 PM. INDICATION: Weakness. COMPARISON: XR chest 10/19/2023. ACCESSION NUMBER(S): [id number] ORDERING CLINICIAN: BUSHRA NGUYEN TECHNIQUE:  Frontal chest was obtained at 15:07 hours. FINDINGS: CARDIOMEDIASTINAL SILHOUETTE: Cardiomediastinal silhouette is mildly enlarged in size. The aorta is calcified and tortuous.  LUNGS: Lungs are mildly hypoinflated, but appear clear.  ABDOMEN: No remarkable upper abdominal findings.  BONES: No acute osseous changes.    Stable mild enlargement of the cardiac silhouette. Mildly hypoinflated lungs which appear clear. Calcified and tortuous aorta. Signed by Alan Linton MD   Results for orders placed or performed during the hospital encounter of 12/16/23 (from the past 24 hour(s))   POCT GLUCOSE   Result Value Ref Range    POCT Glucose 109 (H) 74 - 99 mg/dL   POCT GLUCOSE   Result Value Ref Range    POCT Glucose 116 (H) 74 - 99 mg/dL   CBC   Result Value Ref Range    WBC 6.9 4.4 - 11.3 x10*3/uL    nRBC 1.2 (H) 0.0 - 0.0 /100 WBCs    RBC 3.41 (L) 4.00 - 5.20 x10*6/uL    Hemoglobin 10.0 (L) 12.0 - 16.0 g/dL    Hematocrit 34.9 (L) 36.0 - 46.0 %     (H) 80 - 100 fL    MCH 29.3 26.0 - 34.0 pg    MCHC 28.7 (L) 32.0 - 36.0 g/dL    RDW 21.4 (H) 11.5 - 14.5 %    Platelets 93 (L) 150 - 450 x10*3/uL   TSH with reflex to Free T4 if abnormal   Result Value Ref Range    Thyroid Stimulating Hormone 1.53 0.44 - 3.98 mIU/L   Troponin I, High Sensitivity, Initial   Result Value Ref Range    Troponin I, High Sensitivity 10 0 - 13 ng/L   Comprehensive metabolic panel   Result Value Ref Range    Glucose 138 (H) 74 - 99 mg/dL    Sodium 145 136 - 145 mmol/L    Potassium 6.3 (HH) 3.5 - 5.3 mmol/L    Chloride 110 (H) 98 - 107 mmol/L    Bicarbonate 25 21 - 32 mmol/L    Anion Gap 16 10 - 20 mmol/L    Urea Nitrogen 57 (H) 6 - 23 mg/dL    Creatinine 2.16 (H) 0.50 - 1.05 mg/dL    eGFR 25 (L) >60 mL/min/1.73m*2    Calcium 9.1 8.6 - 10.3 mg/dL    Albumin 3.5 3.4 - 5.0  g/dL    Alkaline Phosphatase 124 33 - 136 U/L    Total Protein 6.3 (L) 6.4 - 8.2 g/dL    AST 32 9 - 39 U/L    Bilirubin, Total 0.3 0.0 - 1.2 mg/dL    ALT 34 7 - 45 U/L   Troponin, High Sensitivity, 1 Hour   Result Value Ref Range    Troponin I, High Sensitivity 12 0 - 13 ng/L   Ammonia   Result Value Ref Range    Ammonia 30 16 - 53 umol/L          Assessment/Plan   Principal Problem:    Acute hyperkalemia  Active Problems:    Lupus (CMS/HCC)    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[facility])    Hyperlipidemia    Advanced COPD (CMS/[facility])    Anemia    Atrial fibrillation (CMS/[facility])    CKD stage G3a/A2, GFR 45-59 and albumin creatinine ratio  mg/g (CMS/[facility])    GERD (gastroesophageal reflux disease)    Benign essential HTN    Pulmonary hypertension (CMS/[facility])    Ulcer of foot, left, with unspecified severity (CMS/[facility])    DVT (deep venous thrombosis) (CMS/[facility])    DM type 2 with diabetic peripheral neuropathy (CMS/[facility])    Hyperkalemia  Patient is a 63-year-old female with PMH significant for T2DM, CKD 3 (baseline CR ~1.6), 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  Patient presented to the [facility] ED on 12/17/2023 after she was found to be hypoglycemic with tremors at nursing home with glucose of 68.  In the ED patient was found to have hyperkalemia with elevated creatinine.  Patient was admitted to medicine for evaluation and management of acute hyperkalemia.    1.  Acute hyperkalemia in setting of CKD with elevated creatinine:  -Potassium 6.3, creatinine 2.16 (baseline 1.6)  -S/p Kayexalate oral suspension 15 g, and calcium gluconate in the ED  -Albuterol nebulized solution one-time dose   -Continue to monitor RFP  -Avoid nephrotoxic agents  -EKG ordered  -Continue telemetry    2.  Left lower extremity diabetic foot wound (CHRONIC):  -X-ray in the ED similar to prior  -On physical exam wound showed no signs of infection  --> ESR + CRP ordered to further rule  out infection  -At this time no express need for antibiotics or further imaging workup, but will defer to day team  -Recommend podiatry follow-up outpatient    3.  Hypoglycemia in setting of DM2:  -Resolved prior to presentation  -Mild SSI #1  -Continue to monitor blood glucose  -Hypoglycemia protocol in place    4.  Paroxysmal A-fib:  -Home Eliquis continued    SLE  HTN  COPD  GERD  -Continue home medications once med rec is complete  IVF: None at this time  Diet: Renal + diabetic  DVT prophylaxis: Home Eliquis continued  Dispo: Anticipate 24 hours hospital stay, admitted obs  Consults: None    CODE STATUS: DNR/DNI    Assessment and plan discussed with senior resident and to be discussed with attending.    Andrew Aranda MD  Internal medicine PGY 1    Senior addendum:    Patient seen and examined independently from intern physician.  The note as shown above has been reviewed and edited to reflect my input.    Nash Mohamud DO  Internal Medicine, PGY-3     Day team addendum:  Plan as above.  Repeat labs after rectal Kayexalate did downtrend with potassium of 5.3, sodium level did rise to 147.  Will do liter bolus of normal saline as likely dehydrated.  Media picture uploaded of chronic left foot ulcer which does not appear infected or need for further workup.  Plan was discharged back to facility however spoke with son who states she actually has been living at home for the last few weeks and has had progressive weakness.  Per her son López Lang, EMS was called due to concern of hypoglycemia as well as weakness. Had been having UTI sx (dysuria), no UA done but urine culture sent on 12/14 resulting in Serratia 100k>. Repeat UA pending. Did receive one dose macrobid per son yesterday prior to admission. Will need PT/OT evaluation depending on the recommendations discharge home versus SNF. Can be transferred to [facility] w. Tele.    Updated- UA grossly positive for leuks, nitrities and bacteria. Started rocephin (has  tolerated cephalosporins in November), per urine culture from 12/14 Sensitive to Rocephin, fluoroquinolones, Zosyn and Bactrim.  Did not use Bactrim due to hyperkalemia and JED.  Would prefer to use cephalosporin over antonio quinolones considering her age comorbidities and antibiotic resistant pattern.  Can de-escalate pending susceptibilities.    Nicole Alarcon, DO

## 2023-12-17 NOTE — ED PROVIDER NOTES
EMERGENCY DEPARTMENT ENCOUNTER      Pt Name: Bing Holliday  MRN: [id number]  Birthdate 1960  Date of evaluation: 12/16/2023  Provider: Mal Prabhakar DO    CHIEF COMPLAINT       Chief Complaint   Patient presents with    Tremors    Hypoglycemia         HISTORY OF PRESENT ILLNESS    64 yo F presenting from a nursing facility for evaluation of tremors noticed by the family.  Patient does have extensive past medical history show there was concern for hypoglycemia at that time.  She does also have a history of essential tremors but claims that these wounds are worse than her normal ones.  They did check her blood sugar at that time and she was at 68 so gave her some peanut butter and other foods.  By the time EMS arrived they checked her sugar and she was at 177.  On arrival to the ER her blood sugar was at 107.  Recheck 20 minutes later had at 117.  Patient was seen and examined and was appearing to be short of breath so was put on nasal cannula 2L.  Upon later examination she was on room air saturating 98%.  Patient's main concern is that she was having weakness and her tremors were worse than normal.  She was following commands appropriately and answer questions appropriately, she is ANO x 2 at baseline.  Patient denies any chest pain, nausea, vomiting, numbness, tingling.  She does endorse pain that is normal with her known rheumatoid.           Nursing Notes were reviewed.    PAST MEDICAL HISTORY     Past Medical History:   Diagnosis Date    Acute upper respiratory infection, unspecified 03/04/2020    Acute URI    Acute upper respiratory infection, unspecified 09/30/2015    URTI (acute upper respiratory infection)    Arthritis     Body mass index (BMI) 23.0-23.9, adult 10/15/2021    BMI 23.0-23.9, adult    Body mass index (BMI) 33.0-33.9, adult 03/04/2020    BMI 33.0-33.9,adult    Cardiomegaly 08/27/2013    Left ventricular hypertrophy    Chronic kidney disease, stage 3 unspecified (CMS/HCC) 07/02/2013     Chronic kidney disease, stage III (moderate)    Disease of pericardium, unspecified 07/02/2013    Pericardial disease    Encounter for follow-up examination after completed treatment for conditions other than malignant neoplasm 10/06/2022    Hospital discharge follow-up    Generalized contraction of visual field, right eye 01/29/2015    Generalized contraction of visual field of right eye    Homonymous bilateral field defects, right side 04/29/2016    Homonymous bilateral field defects of right side    Hypertensive chronic kidney disease with stage 1 through stage 4 chronic kidney disease, or unspecified chronic kidney disease 07/02/2013    Nephrosclerosis    Laceration without foreign body, left foot, initial encounter 07/03/2018    Foot laceration, left, initial encounter    Migraine with aura, not intractable, without status migrainosus 10/24/2022    Ocular migraine    Other conditions influencing health status 07/02/2013    Chronic Glomerulonephritis In Diseases Classified Elsewhere    Other conditions influencing health status 07/02/2013    Progressive Familial Myoclonic Epilepsy    Other conditions influencing health status 07/02/2013    Protein S Deficiency    Other conditions influencing health status 05/22/2015    Familial Combined Hyperlipidemia    Other conditions influencing health status 10/24/2022    IDDM (insulin dependent diabetes mellitus)    Other conditions influencing health status 03/14/2022    Diabetes mellitus, insulin dependent (IDDM), uncontrolled    Other long term (current) drug therapy 10/24/2022    Long-term use of Plaquenil    Personal history of diseases of the blood and blood-forming organs and certain disorders involving the immune mechanism 07/02/2013    History of thrombocytopenia    Personal history of diseases of the skin and subcutaneous tissue 08/11/2015    History of foot ulcer    Personal history of nephrotic syndrome 07/02/2013    History of nephrotic syndrome    Personal  history of other diseases of the circulatory system 08/27/2013    History of sinus tachycardia    Personal history of other diseases of the nervous system and sense organs     History of cataract    Personal history of other diseases of the respiratory system     History of bronchitis    Personal history of other infectious and parasitic diseases 07/02/2013    History of hepatitis    Personal history of other specified conditions     History of shortness of breath    Personal history of other specified conditions 08/27/2013    History of edema    Puckering of macula, right eye 10/24/2022    ERM OD (epiretinal membrane, right eye)    Raynaud's syndrome without gangrene 07/02/2013    Raynaud's disease    Systemic lupus erythematosus, unspecified (CMS/HCC) 07/24/2015    SLE (systemic lupus erythematosus)    Systemic lupus erythematosus, unspecified (CMS/HCC) 07/24/2015    SLE (systemic lupus erythematosus)    Systemic lupus erythematosus, unspecified (CMS/HCC) 07/24/2015    Systemic lupus    Type 2 diabetes mellitus with diabetic nephropathy (CMS/HCC) 07/02/2013    Type 2 diabetes with nephropathy    Type 2 diabetes mellitus with mild nonproliferative diabetic retinopathy without macular edema, left eye (CMS/HCC) 07/27/2015    Non-proliferative diabetic retinopathy, left eye    Type 2 diabetes mellitus with mild nonproliferative diabetic retinopathy without macular edema, unspecified eye (CMS/HCC) 07/24/2015    Mild non proliferative diabetic retinopathy    Type 2 diabetes mellitus with proliferative diabetic retinopathy without macular edema, right eye (CMS/HCC) 07/27/2015    Proliferative diabetic retinopathy of right eye    Type 2 diabetes mellitus with proliferative diabetic retinopathy without macular edema, unspecified eye (CMS/HCC) 07/24/2015    Diabetic proliferative retinopathy    Unspecified acute and subacute iridocyclitis 07/24/2015    Acute iritis, right eye    Unspecified open wound, left foot, sequela  07/03/2018    Wound, open, foot, left, sequela         SURGICAL HISTORY       Past Surgical History:   Procedure Laterality Date    ANKLE SURGERY  01/29/2015    Ankle Surgery    CHOLECYSTECTOMY  01/29/2015    Cholecystectomy    CT GUIDED PERCUTANEOUS BIOPSY BONE DEEP  5/4/2021    CT GUIDED PERCUTANEOUS BIOPSY BONE DEEP 5/4/2021 [facility] CLINICAL LEGAC[facility]    EYE SURGERY  03/06/2015    Eye Surgery    FOOT SURGERY  01/29/2015    Foot Surgery    MR HEAD ANGIO WO IV CONTRAST  7/26/2013    MR HEAD ANGIO WO IV CONTRAST 7/26/2013 [facility] CLINICAL LEGACY    MR HEAD ANGIO WO IV CONTRAST  9/17/2021    MR HEAD ANGIO WO IV CONTRAST 9/17/2021 [facility]    MR HEAD ANGIO WO IV CONTRAST  3/25/2023    MR HEAD ANGIO WO IV CONTRAST [facility]    MR NECK ANGIO WO IV CONTRAST  7/26/2013    MR NECK ANGIO WO IV CONTRAST 7/26/2013 [facility] CLINICAL LEGACY    MR NECK ANGIO WO IV CONTRAST  9/17/2021    MR NECK ANGIO WO IV CONTRAST 9/17/2021 [facility]    MR NECK ANGIO WO IV CONTRAST  3/25/2023    MR NECK ANGIO WO IV CONTRAST [facility]    OTHER SURGICAL HISTORY  01/29/2015    Creation Of Pericardial Window    OTHER SURGICAL HISTORY  01/29/2015    Quadricepsplasty    TOTAL HIP ARTHROPLASTY  01/29/2015    Hip Replacement         CURRENT MEDICATIONS       Previous Medications    ACETAMINOPHEN (TYLENOL) 325 MG TABLET    Take 2 tablets (650 mg) by mouth every 4 hours if needed for mild pain (1 - 3).    AMLODIPINE (NORVASC) 2.5 MG TABLET    Take 1 tablet (2.5 mg) by mouth once daily.    APIXABAN (ELIQUIS) 5 MG TABLET    Take 0.5 tablets (2.5 mg) by mouth 2 times a day.    ATORVASTATIN (LIPITOR) 40 MG TABLET    Take 1 tablet (40 mg) by mouth once daily.    BELIMUMAB (BENLYSTA) 400 MG RECON SOLN IV INJECTION    Infuse 10 mg/kg into a venous catheter every 28 (twenty-eight) days.  FOR MAINTENANCE THERAPY    BLOOD-GLUCOSE SENSOR (DEXCOM G6 SENSOR) DEVICE    Use to check sugars 3 times daily    CA CARB-D3-MAG PZ-EUW-FPUD- MG-20 MCG- 25 MG-0.5 MG  TABLET    Take 1 tablet by mouth 2 times a day.    DEXCOM G4 PLATINUM  (DEXCOM G6 ) MISC    Use as instructed    DEXCOM G4 PLATINUM TRANSMITTER (DEXCOM G6 TRANSMITTER) DEVICE    Use as instructed    FLUTICASONE-UMECLIDIN-VILANTER (TRELEGY-ELLIPTA) 200-62.5-25 MCG BLISTER WITH DEVICE    Inhale 1 puff once daily.    FOLIC ACID (FOLVITE) 1 MG TABLET    TAKE 1 TABLET BY MOUTH EVERY DAY    HYDROCODONE-ACETAMINOPHEN (NORCO) 5-325 MG TABLET    Take 1 tablet by mouth 2 times a day as needed for severe pain (7 - 10).    MAGNESIUM OXIDE (MAG-OX) 400 MG TABLET    Take 1 tablet (400 mg) by mouth once daily.    MELATONIN 5 MG TABLET    Take 1 tablet (5 mg) by mouth once daily at bedtime.    MULTIVITAMIN TABLET    Take 1 tablet by mouth once daily.    MYCOPHENOLATE (CELLCEPT) 500 MG TABLET    Take 2 tablets (1,000 mg) by mouth twice a day.    NITROFURANTOIN, MACROCRYSTAL-MONOHYDRATE, (MACROBID) 100 MG CAPSULE    Take 1 capsule (100 mg) by mouth 2 times a day for 10 days.    PANTOPRAZOLE (PROTONIX) 40 MG EC TABLET    TAKE 1 TABLET BY MOUTH EVERY DAY    PREDNISONE (DELTASONE) 10 MG TABLET    Take 1 tablet (10 mg) by mouth once daily in the morning. Take first thing when you wake up every morning.    PREDNISONE (DELTASONE) 5 MG TABLET    Take 1 tablet (5 mg) by mouth see administration instructions. Take one tablet everyday at 2PM scheduled    RISPERIDONE (RISPERDAL) 0.5 MG TABLET    TAKE 1 TABLET BY MOUTH AT BEDTIME    TORSEMIDE (DEMADEX) 10 MG TABLET    TAKE 1 TABLET BY MOUTH EVERY DAY       ALLERGIES     Ace inhibitors, Hydroxychloroquine, Lisinopril, Penicillins, and Sulfa (sulfonamide antibiotics)    FAMILY HISTORY       Family History   Problem Relation Name Age of Onset    Other (RENAL DISEASE) Mother          END STAGE    Other (CARDIAC DISORDER) Mother      Cataracts Mother      Stroke Mother      Diabetes Mother      Kidney disease Mother      Lupus Mother      Other (CARDIAC DISORDER) Father      COPD  Father      Glaucoma Father      Hypertension Father      Sleep apnea Father      Other (CARDIAC DISORDER) Sister      Depression Sister      Kidney disease Sister      Sickle cell trait Sister      Sleep apnea Daughter            SOCIAL HISTORY       Social History     Socioeconomic History    Marital status:      Spouse name: Not on file    Number of children: Not on file    Years of education: Not on file    Highest education level: Not on file   Occupational History    Not on file   Tobacco Use    Smoking status: Never    Smokeless tobacco: Never   Vaping Use    Vaping Use: Never used   Substance and Sexual Activity    Alcohol use: Never    Drug use: Never    Sexual activity: Defer   Other Topics Concern    Not on file   Social History Narrative    Not on file     Social Determinants of Health     Financial Resource Strain: Low Risk  (11/20/2023)    Overall Financial Resource Strain (CARDIA)     Difficulty of Paying Living Expenses: Not hard at all   Food Insecurity: Not on file   Transportation Needs: No Transportation Needs (11/20/2023)    PRAPARE - Transportation     Lack of Transportation (Medical): No     Lack of Transportation (Non-Medical): No   Physical Activity: Not on file   Stress: Not on file   Social Connections: Not on file   Intimate Partner Violence: Not on file   Housing Stability: Low Risk  (11/20/2023)    Housing Stability Vital Sign     Unable to Pay for Housing in the Last Year: No     Number of Places Lived in the Last Year: 1     Unstable Housing in the Last Year: No       SCREENINGS                        PHYSICAL EXAM    (up to 7 for level 4, 8 or more for level 5)     ED Triage Vitals [12/16/23 2348]   Temp Heart Rate Resp BP   36 °C (96.8 °F) 58 20 152/82      SpO2 Temp src Heart Rate Source Patient Position   94 % -- -- --      BP Location FiO2 (%)     -- --       Physical Exam  Constitutional:       Comments: Patient appearing tired room answers questions but takes time; does  have noted rheumatoid deformities   HENT:      Right Ear: External ear normal.      Left Ear: External ear normal.      Nose: Nose normal.      Mouth/Throat:      Mouth: Mucous membranes are moist.   Eyes:      Extraocular Movements: Extraocular movements intact.      Conjunctiva/sclera: Conjunctivae normal.      Pupils: Pupils are equal, round, and reactive to light.      Comments: Patient denies actually close on exam, when asked it was due to light sensitivity.  When lights were dimmed she was able to open her eyes.   Cardiovascular:      Rate and Rhythm: Normal rate and regular rhythm.   Pulmonary:      Effort: Pulmonary effort is normal.      Breath sounds: Normal breath sounds.   Abdominal:      General: Abdomen is flat.      Palpations: Abdomen is soft.   Musculoskeletal:      Cervical back: Normal range of motion.      Comments: Range of motion limited by weakness, per patient at baseline   Neurological:      General: No focal deficit present.      Mental Status: She is oriented to person, place, and time.   Psychiatric:         Mood and Affect: Mood normal.         Behavior: Behavior normal.          DIAGNOSTIC RESULTS     LABS:  Labs Reviewed   CBC - Abnormal       Result Value    WBC 6.9      nRBC 1.2 (*)     RBC 3.41 (*)     Hemoglobin 10.0 (*)     Hematocrit 34.9 (*)      (*)     MCH 29.3      MCHC 28.7 (*)     RDW 21.4 (*)     Platelets 93 (*)    POCT GLUCOSE - Abnormal    POCT Glucose 109 (*)    POCT GLUCOSE - Abnormal    POCT Glucose 116 (*)    TSH WITH REFLEX TO FREE T4 IF ABNORMAL - Normal    Thyroid Stimulating Hormone 1.53      Narrative:     TSH testing is performed using different testing methodology at [facility] than at other [facility]. Direct result comparisons should only be made within the same method.     SERIAL TROPONIN-INITIAL - Normal    Troponin I, High Sensitivity 10      Narrative:     Less than 99th percentile of normal range cutoff-  Female and children  under 18 years old <14 ng/L; Male <21 ng/L: Negative  Repeat testing should be performed if clinically indicated.     Female and children under 18 years old 14-50 ng/L; Male 21-50 ng/L:  Consistent with possible cardiac damage and possible increased clinical   risk. Serial measurements may help to assess extent of myocardial damage.     >50 ng/L: Consistent with cardiac damage, increased clinical risk and  myocardial infarction. Serial measurements may help assess extent of   myocardial damage.      NOTE: Children less than 1 year old may have higher baseline troponin   levels and results should be interpreted in conjunction with the overall   clinical context.     NOTE: Troponin I testing is performed using a different   testing methodology at [facility] than at other   [facility]. Direct result comparisons should only   be made within the same method.   TROPONIN SERIES- (INITIAL, 1 HR)    Narrative:     The following orders were created for panel order Troponin Series, (0, 1 HR).  Procedure                               Abnormality         Status                     ---------                               -----------         ------                     Troponin I, High Sensiti...[[id number]]  Normal              Final result               Troponin, High Sensitivi...[[id number]]                                                   Please view results for these tests on the individual orders.   URINALYSIS WITH REFLEX MICROSCOPIC AND CULTURE   SERIAL TROPONIN, 1 HOUR   COMPREHENSIVE METABOLIC PANEL   AMMONIA       All other labs were within normal range or not returned as of this dictation.    Imaging  CT head wo IV contrast   Final Result   Unchanged chronic left occipital infarct as well as mild chronic   small-vessel ischemic changes.             Signed by: Tr Colunga 12/17/2023 1:32 AM   Dictation workstation:   [id number]      XR chest 1 view   Final Result   1.  No evidence of acute  cardiopulmonary process. Mild cardiomegaly   and extensive coronary artery atherosclerotic calcifications.             Signed by: Tr Colunga 12/17/2023 1:25 AM   Dictation workstation:   [id number]      XR foot left 1-2 views   Final Result   Extensive degenerative changes in the left foot with chronic mid   tarsal osseous fusion. No radiographic evidence of osseous erosion or   abnormal periosteal reaction. Nonspecific diffuse soft tissue   swelling, similar to prior.             MACRO:   None        Signed by: Tr Colunga 12/17/2023 1:29 AM   Dictation workstation:   [id number]           Procedures  Procedures     EMERGENCY DEPARTMENT COURSE/MDM:         Medical Decision Making  63-year-old female presenting for evaluation of tremor and shortness of breath.  Reassuring that blood glucose has been stable in the ER.  Will check CBC, CMP, TSH, and ammonia level given tremors.  Chest x-ray and troponin will be checked to rule out cardiac origin and to evaluate SOB and EKG for arrhythmia.  UA ordered to evaluate for underlying UTI. CBC at baseline. CT head, xray foot, and CXR neg for acute issues.    Pt signed out to Dr. Spencer at this point.        Patient and or family in agreement and understanding of treatment plan.  All questions answered.      I reviewed the case with the attending ED physician. The attending ED physician agrees with the plan. Patient and/or patient´s representative was counseled regarding labs, imaging, likely diagnosis, and plan. All questions were answered.    ED Medications administered this visit:  Medications - No data to display    New Prescriptions from this visit:    New Prescriptions    No medications on file       Follow-up:  No follow-up provider specified.      Final Impression: No diagnosis found.      (Please note that portions of this note were completed with a voice recognition program.  Efforts were made to edit the dictations but occasionally words are  mis-transcribed.)     Mal Prabhakar DO  Resident  12/17/23 0145       Mal Prabhakar DO  Resident  12/17/23 0146

## 2023-12-17 NOTE — NURSING NOTE
Pt admitted into 2117 via cart from ED.  Pt alert and to self and place.  Does not know name of nursing home she resides at.  Incontinent of large amount of stool from ED.  Cleaned up and repositioned.  Pt has b/l hand deformities stating d/t rheumatoid  arthritis.  Oriented to room and unit.  Call light in reach.

## 2023-12-18 LAB
ACANTHOCYTES BLD QL SMEAR: ABNORMAL
ALBUMIN SERPL BCP-MCNC: 3.1 G/DL (ref 3.4–5)
ANION GAP SERPL CALC-SCNC: 12 MMOL/L (ref 10–20)
ATRIAL RATE: 48 BPM
BACTERIA UR CULT: ABNORMAL
BASOPHILS # BLD MANUAL: 0 X10*3/UL (ref 0–0.1)
BASOPHILS NFR BLD MANUAL: 0 %
BUN SERPL-MCNC: 44 MG/DL (ref 6–23)
CALCIUM SERPL-MCNC: 9 MG/DL (ref 8.6–10.3)
CHLORIDE SERPL-SCNC: 109 MMOL/L (ref 98–107)
CO2 SERPL-SCNC: 29 MMOL/L (ref 21–32)
CREAT SERPL-MCNC: 1.89 MG/DL (ref 0.5–1.05)
DACRYOCYTES BLD QL SMEAR: ABNORMAL
EOSINOPHIL # BLD MANUAL: 0 X10*3/UL (ref 0–0.7)
EOSINOPHIL NFR BLD MANUAL: 0 %
ERYTHROCYTE [DISTWIDTH] IN BLOOD BY AUTOMATED COUNT: 21.3 % (ref 11.5–14.5)
GFR SERPL CREATININE-BSD FRML MDRD: 30 ML/MIN/1.73M*2
GLUCOSE BLD MANUAL STRIP-MCNC: 101 MG/DL (ref 74–99)
GLUCOSE BLD MANUAL STRIP-MCNC: 143 MG/DL (ref 74–99)
GLUCOSE BLD MANUAL STRIP-MCNC: 154 MG/DL (ref 74–99)
GLUCOSE BLD MANUAL STRIP-MCNC: 184 MG/DL (ref 74–99)
GLUCOSE SERPL-MCNC: 114 MG/DL (ref 74–99)
HCT VFR BLD AUTO: 29.8 % (ref 36–46)
HGB BLD-MCNC: 8.5 G/DL (ref 12–16)
IMM GRANULOCYTES # BLD AUTO: 0.22 X10*3/UL (ref 0–0.7)
IMM GRANULOCYTES NFR BLD AUTO: 3.5 % (ref 0–0.9)
LYMPHOCYTES # BLD MANUAL: 0.99 X10*3/UL (ref 1.2–4.8)
LYMPHOCYTES NFR BLD MANUAL: 16 %
MAGNESIUM SERPL-MCNC: 2.38 MG/DL (ref 1.6–2.4)
MCH RBC QN AUTO: 29.5 PG (ref 26–34)
MCHC RBC AUTO-ENTMCNC: 28.5 G/DL (ref 32–36)
MCV RBC AUTO: 104 FL (ref 80–100)
MONOCYTES # BLD MANUAL: 0.25 X10*3/UL (ref 0.1–1)
MONOCYTES NFR BLD MANUAL: 4 %
NEUTROPHILS # BLD MANUAL: 4.96 X10*3/UL (ref 1.2–7.7)
NEUTS BAND # BLD MANUAL: 0.12 X10*3/UL (ref 0–0.7)
NEUTS BAND NFR BLD MANUAL: 2 %
NEUTS SEG # BLD MANUAL: 4.84 X10*3/UL (ref 1.2–7)
NEUTS SEG NFR BLD MANUAL: 78 %
NRBC BLD-RTO: 1.1 /100 WBCS (ref 0–0)
OVALOCYTES BLD QL SMEAR: ABNORMAL
P AXIS: 35 DEGREES
P OFFSET: 182 MS
P ONSET: 146 MS
PHOSPHATE SERPL-MCNC: 4.1 MG/DL (ref 2.5–4.9)
PLATELET # BLD AUTO: 90 X10*3/UL (ref 150–450)
POLYCHROMASIA BLD QL SMEAR: ABNORMAL
POTASSIUM SERPL-SCNC: 5 MMOL/L (ref 3.5–5.3)
PR INTERVAL: 142 MS
Q ONSET: 217 MS
QRS COUNT: 8 BEATS
QRS DURATION: 98 MS
QT INTERVAL: 450 MS
QTC CALCULATION(BAZETT): 402 MS
QTC FREDERICIA: 417 MS
R AXIS: -4 DEGREES
RBC # BLD AUTO: 2.88 X10*6/UL (ref 4–5.2)
RBC MORPH BLD: ABNORMAL
SCHISTOCYTES BLD QL SMEAR: ABNORMAL
SODIUM SERPL-SCNC: 145 MMOL/L (ref 136–145)
T AXIS: 18 DEGREES
T OFFSET: 442 MS
TARGETS BLD QL SMEAR: ABNORMAL
TOTAL CELLS COUNTED BLD: 100
VENTRICULAR RATE: 48 BPM
WBC # BLD AUTO: 6.2 X10*3/UL (ref 4.4–11.3)

## 2023-12-18 PROCEDURE — 82947 ASSAY GLUCOSE BLOOD QUANT: CPT

## 2023-12-18 PROCEDURE — 2500000001 HC RX 250 WO HCPCS SELF ADMINISTERED DRUGS (ALT 637 FOR MEDICARE OP)

## 2023-12-18 PROCEDURE — G0378 HOSPITAL OBSERVATION PER HR: HCPCS

## 2023-12-18 PROCEDURE — 85007 BL SMEAR W/DIFF WBC COUNT: CPT

## 2023-12-18 PROCEDURE — 99233 SBSQ HOSP IP/OBS HIGH 50: CPT

## 2023-12-18 PROCEDURE — 2500000004 HC RX 250 GENERAL PHARMACY W/ HCPCS (ALT 636 FOR OP/ED)

## 2023-12-18 PROCEDURE — 85027 COMPLETE CBC AUTOMATED: CPT

## 2023-12-18 PROCEDURE — 36415 COLL VENOUS BLD VENIPUNCTURE: CPT

## 2023-12-18 PROCEDURE — 83735 ASSAY OF MAGNESIUM: CPT

## 2023-12-18 PROCEDURE — 80069 RENAL FUNCTION PANEL: CPT

## 2023-12-18 RX ORDER — IPRATROPIUM BROMIDE AND ALBUTEROL SULFATE 2.5; .5 MG/3ML; MG/3ML
3 SOLUTION RESPIRATORY (INHALATION) 3 TIMES DAILY
Status: DISCONTINUED | OUTPATIENT
Start: 2023-12-18 | End: 2023-12-19

## 2023-12-18 RX ORDER — DEXTROSE MONOHYDRATE AND SODIUM CHLORIDE 5; .45 G/100ML; G/100ML
140 INJECTION, SOLUTION INTRAVENOUS CONTINUOUS
Status: ACTIVE | OUTPATIENT
Start: 2023-12-18 | End: 2023-12-18

## 2023-12-18 RX ADMIN — APIXABAN 2.5 MG: 2.5 TABLET, FILM COATED ORAL at 09:08

## 2023-12-18 RX ADMIN — PANTOPRAZOLE SODIUM 40 MG: 40 TABLET, DELAYED RELEASE ORAL at 09:08

## 2023-12-18 RX ADMIN — ATORVASTATIN CALCIUM 40 MG: 40 TABLET, FILM COATED ORAL at 09:08

## 2023-12-18 RX ADMIN — Medication 5 MG: at 20:19

## 2023-12-18 RX ADMIN — CEFTRIAXONE SODIUM 1 G: 1 INJECTION, SOLUTION INTRAVENOUS at 09:06

## 2023-12-18 RX ADMIN — Medication 400 MG: at 09:08

## 2023-12-18 RX ADMIN — DEXTROSE AND SODIUM CHLORIDE 140 ML/HR: 5; 450 INJECTION, SOLUTION INTRAVENOUS at 11:32

## 2023-12-18 RX ADMIN — MYCOPHENOLATE MOFETIL 1000 MG: 250 CAPSULE ORAL at 20:18

## 2023-12-18 RX ADMIN — PREDNISONE 10 MG: 5 TABLET ORAL at 09:07

## 2023-12-18 RX ADMIN — AMLODIPINE BESYLATE 2.5 MG: 2.5 TABLET ORAL at 09:08

## 2023-12-18 RX ADMIN — MYCOPHENOLATE MOFETIL 1000 MG: 250 CAPSULE ORAL at 09:08

## 2023-12-18 RX ADMIN — APIXABAN 2.5 MG: 2.5 TABLET, FILM COATED ORAL at 20:19

## 2023-12-18 ASSESSMENT — COGNITIVE AND FUNCTIONAL STATUS - GENERAL
PERSONAL GROOMING: A LOT
DRESSING REGULAR LOWER BODY CLOTHING: A LOT
WALKING IN HOSPITAL ROOM: A LOT
TURNING FROM BACK TO SIDE WHILE IN FLAT BAD: A LOT
EATING MEALS: A LITTLE
HELP NEEDED FOR BATHING: A LOT
CLIMB 3 TO 5 STEPS WITH RAILING: A LOT
PERSONAL GROOMING: A LOT
TOILETING: A LOT
MOVING TO AND FROM BED TO CHAIR: A LOT
TOILETING: A LOT
MOBILITY SCORE: 12
CLIMB 3 TO 5 STEPS WITH RAILING: A LOT
STANDING UP FROM CHAIR USING ARMS: A LOT
TURNING FROM BACK TO SIDE WHILE IN FLAT BAD: A LOT
DAILY ACTIVITIY SCORE: 13
STANDING UP FROM CHAIR USING ARMS: A LOT
DRESSING REGULAR UPPER BODY CLOTHING: A LOT
MOVING FROM LYING ON BACK TO SITTING ON SIDE OF FLAT BED WITH BEDRAILS: A LOT
EATING MEALS: A LITTLE
MOBILITY SCORE: 12
DRESSING REGULAR UPPER BODY CLOTHING: A LOT
MOVING FROM LYING ON BACK TO SITTING ON SIDE OF FLAT BED WITH BEDRAILS: A LOT
HELP NEEDED FOR BATHING: A LOT
MOVING TO AND FROM BED TO CHAIR: A LOT
DAILY ACTIVITIY SCORE: 13
WALKING IN HOSPITAL ROOM: A LOT
DRESSING REGULAR LOWER BODY CLOTHING: A LOT

## 2023-12-18 ASSESSMENT — PAIN SCALES - GENERAL
PAINLEVEL_OUTOF10: 0 - NO PAIN
PAINLEVEL_OUTOF10: 0 - NO PAIN

## 2023-12-18 ASSESSMENT — ACTIVITIES OF DAILY LIVING (ADL)
WALKS IN HOME: NEEDS ASSISTANCE
FEEDING YOURSELF: NEEDS ASSISTANCE
PATIENT'S MEMORY ADEQUATE TO SAFELY COMPLETE DAILY ACTIVITIES?: YES
GROOMING: NEEDS ASSISTANCE
HEARING - RIGHT EAR: FUNCTIONAL
TOILETING: NEEDS ASSISTANCE
BATHING: NEEDS ASSISTANCE
DRESSING YOURSELF: NEEDS ASSISTANCE
JUDGMENT_ADEQUATE_SAFELY_COMPLETE_DAILY_ACTIVITIES: YES
HEARING - LEFT EAR: FUNCTIONAL
ADEQUATE_TO_COMPLETE_ADL: YES

## 2023-12-18 ASSESSMENT — PAIN - FUNCTIONAL ASSESSMENT: PAIN_FUNCTIONAL_ASSESSMENT: 0-10

## 2023-12-18 NOTE — CARE PLAN
Problem: Pain  Goal: My pain/discomfort is manageable  12/18/2023 0616 by Ankita Kline RN  Outcome: Progressing  12/18/2023 0616 by Ankita Kline RN  Outcome: Progressing     Problem: Safety  Goal: Patient will be injury free during hospitalization  12/18/2023 0616 by Ankita Kline RN  Outcome: Progressing  12/18/2023 0616 by Ankita Kline RN  Outcome: Progressing  Goal: I will remain free of falls  12/18/2023 0616 by Ankita Kline RN  Outcome: Progressing  12/18/2023 0616 by Ankita Kline RN  Outcome: Progressing     Problem: Daily Care  Goal: Daily care needs are met  12/18/2023 0616 by Ankita Kline RN  Outcome: Progressing  12/18/2023 0616 by Ankita Kline RN  Outcome: Progressing     Problem: Psychosocial Needs  Goal: Demonstrates ability to cope with hospitalization/illness  12/18/2023 0616 by Ankita Kline RN  Outcome: Progressing  12/18/2023 0616 by Ankita Kline RN  Outcome: Progressing  Goal: Collaborate with me, my family, and caregiver to identify my specific goals  12/18/2023 0616 by Ankita Kline RN  Outcome: Progressing  12/18/2023 0616 by Ankita Kline RN  Outcome: Progressing     Problem: Discharge Barriers  Goal: My discharge needs are met  12/18/2023 0616 by Ankita Kline RN  Outcome: Progressing  12/18/2023 0616 by Ankita Kline RN  Outcome: Progressing     Problem: Skin  Goal: Decreased wound size/increased tissue granulation at next dressing change  Outcome: Progressing  Flowsheets (Taken 12/18/2023 0616)  Decreased wound size/increased tissue granulation at next dressing change: Protective dressings over bony prominences  Goal: Participates in plan/prevention/treatment measures  Outcome: Progressing  Goal: Prevent/manage excess moisture  Outcome: Progressing  Goal: Prevent/minimize sheer/friction injuries  Outcome: Progressing  Flowsheets (Taken 12/18/2023 0616)  Prevent/minimize sheer/friction injuries:   Turn/reposition every 2 hours/use positioning/transfer  devices   HOB 30 degrees or less   Use pull sheet  Goal: Promote/optimize nutrition  Outcome: Progressing  Goal: Promote skin healing  Outcome: Progressing   The patient's goals for the shift include      The clinical goals for the shift include Pt will remain free from fall/injury

## 2023-12-18 NOTE — PROGRESS NOTES
Physical Therapy                 Therapy Communication Note    Patient Name: Bing Holliday  MRN: [id number]  Today's Date: 12/18/2023     Discipline: Physical Therapy    Missed Time: Attempt    Comment:  PT orders received, chart reviewed.  Attempted PT eval, pt being cleaned up by nursing staff.  Will re-attempt PT eval as time permits.

## 2023-12-18 NOTE — PROGRESS NOTES
Bing Holliday is a 63 y.o. female on day 0 of admission presenting with Acute hyperkalemia.      Subjective   NAEON. Patient continues to have fatigue and A/O x to self and place.       Objective     Last Recorded Vitals  /66 (BP Location: Right arm, Patient Position: Lying)   Pulse 52   Temp 35.4 °C (95.7 °F) (Tympanic)   Resp 12   Wt 93.4 kg (205 lb 14.6 oz)   SpO2 97%   Intake/Output last 3 Shifts:    Intake/Output Summary (Last 24 hours) at 12/18/2023 1541  Last data filed at 12/18/2023 1224  Gross per 24 hour   Intake 950.5 ml   Output 300 ml   Net 650.5 ml       Admission Weight  Weight: 105 kg (231 lb 7.7 oz) (12/16/23 2348)    Daily Weight  12/18/23 : 93.4 kg (205 lb 14.6 oz)    Image Results  ECG 12 lead  Marked sinus bradycardia  Moderate voltage criteria for LVH, may be normal variant  Nonspecific T wave abnormality  Abnormal ECG  When compared with ECG of 20-NOV-2023 15:44,  No significant change was found      Physical Exam  Vitals reviewed.   Constitutional:       General: She is not in acute distress.     Appearance: Normal appearance. She is not ill-appearing, toxic-appearing or diaphoretic.   HENT:      Head: Normocephalic and atraumatic.      Right Ear: External ear normal.      Left Ear: External ear normal.      Nose: Nose normal.      Mouth/Throat:      Mouth: Mucous membranes are moist.      Pharynx: Oropharynx is clear.   Eyes:      Extraocular Movements: Extraocular movements intact.      Pupils: Pupils are equal, round, and reactive to light.   Cardiovascular:      Rate and Rhythm: Normal rate and regular rhythm.      Pulses: Normal pulses.      Heart sounds: Normal heart sounds. No murmur heard.     No friction rub. No gallop.   Pulmonary:      Effort: Pulmonary effort is normal. No respiratory distress.      Breath sounds: Normal breath sounds. No wheezing, rhonchi or rales.   Abdominal:      General: Abdomen is flat. Bowel sounds are normal. There is no distension.       Palpations: Abdomen is soft. There is no mass.      Tenderness: There is no abdominal tenderness.      Hernia: No hernia is present.   Musculoskeletal:         General: Normal range of motion.      Cervical back: Normal range of motion and neck supple.   Skin:     General: Skin is warm and dry.      Capillary Refill: Capillary refill takes less than 2 seconds.   Neurological:      General: No focal deficit present.      Mental Status: Mental status is at baseline. She is disoriented.      Comments: A/Ox2 to person and place   Psychiatric:         Mood and Affect: Mood normal.         Behavior: Behavior normal.         Relevant Results    Scheduled medications  amLODIPine, 2.5 mg, oral, Daily  apixaban, 2.5 mg, oral, BID  atorvastatin, 40 mg, oral, Daily  budesonide, 0.5 mg, nebulization, BID  cefTRIAXone, 1 g, intravenous, Daily  formoterol, 20 mcg, nebulization, BID  ipratropium-albuteroL, 3 mL, nebulization, TID  magnesium oxide, 400 mg, oral, Daily  melatonin, 5 mg, oral, Nightly  mycophenolate, 1,000 mg, oral, BID  pantoprazole, 40 mg, oral, Daily  polyethylene glycol, 17 g, oral, Daily  predniSONE, 10 mg, oral, q AM  predniSONE, 5 mg, oral, See admin instructions  [Held by provider] torsemide, 10 mg, oral, Once per day on Mon Wed Fri      Continuous medications  dextrose 5%-0.45 % sodium chloride, 140 mL/hr, Last Rate: 140 mL/hr (12/18/23 1224)      PRN medications  PRN medications: acetaminophen, dextrose 10 % in water (D10W), dextrose, glucagon  Results for orders placed or performed during the hospital encounter of 12/16/23 (from the past 24 hour(s))   POCT GLUCOSE   Result Value Ref Range    POCT Glucose 187 (H) 74 - 99 mg/dL   POCT GLUCOSE   Result Value Ref Range    POCT Glucose 117 (H) 74 - 99 mg/dL   POCT GLUCOSE   Result Value Ref Range    POCT Glucose 143 (H) 74 - 99 mg/dL   CBC and Auto Differential   Result Value Ref Range    WBC 6.2 4.4 - 11.3 x10*3/uL    nRBC 1.1 (H) 0.0 - 0.0 /100 WBCs    RBC  2.88 (L) 4.00 - 5.20 x10*6/uL    Hemoglobin 8.5 (L) 12.0 - 16.0 g/dL    Hematocrit 29.8 (L) 36.0 - 46.0 %     (H) 80 - 100 fL    MCH 29.5 26.0 - 34.0 pg    MCHC 28.5 (L) 32.0 - 36.0 g/dL    RDW 21.3 (H) 11.5 - 14.5 %    Platelets 90 (L) 150 - 450 x10*3/uL    Immature Granulocytes %, Automated 3.5 (H) 0.0 - 0.9 %    Immature Granulocytes Absolute, Automated 0.22 0.00 - 0.70 x10*3/uL   Renal function panel   Result Value Ref Range    Glucose 114 (H) 74 - 99 mg/dL    Sodium 145 136 - 145 mmol/L    Potassium 5.0 3.5 - 5.3 mmol/L    Chloride 109 (H) 98 - 107 mmol/L    Bicarbonate 29 21 - 32 mmol/L    Anion Gap 12 10 - 20 mmol/L    Urea Nitrogen 44 (H) 6 - 23 mg/dL    Creatinine 1.89 (H) 0.50 - 1.05 mg/dL    eGFR 30 (L) >60 mL/min/1.73m*2    Calcium 9.0 8.6 - 10.3 mg/dL    Phosphorus 4.1 2.5 - 4.9 mg/dL    Albumin 3.1 (L) 3.4 - 5.0 g/dL   Magnesium   Result Value Ref Range    Magnesium 2.38 1.60 - 2.40 mg/dL   Manual Differential   Result Value Ref Range    Neutrophils %, Manual 78.0 40.0 - 80.0 %    Bands %, Manual 2.0 0.0 - 5.0 %    Lymphocytes %, Manual 16.0 13.0 - 44.0 %    Monocytes %, Manual 4.0 2.0 - 10.0 %    Eosinophils %, Manual 0.0 0.0 - 6.0 %    Basophils %, Manual 0.0 0.0 - 2.0 %    Seg Neutrophils Absolute, Manual 4.84 1.20 - 7.00 x10*3/uL    Bands Absolute, Manual 0.12 0.00 - 0.70 x10*3/uL    Lymphocytes Absolute, Manual 0.99 (L) 1.20 - 4.80 x10*3/uL    Monocytes Absolute, Manual 0.25 0.10 - 1.00 x10*3/uL    Eosinophils Absolute, Manual 0.00 0.00 - 0.70 x10*3/uL    Basophils Absolute, Manual 0.00 0.00 - 0.10 x10*3/uL    Total Cells Counted 100     Neutrophils Absolute, Manual 4.96 1.20 - 7.70 x10*3/uL    RBC Morphology See Below     Polychromasia Mild     RBC Fragments Few     Target Cells Few     Ovalocytes Few     Teardrop Cells Few     Acanthocytes Few    POCT GLUCOSE   Result Value Ref Range    POCT Glucose 101 (H) 74 - 99 mg/dL     ECG 12 lead    Result Date: 12/18/2023  Marked sinus bradycardia  Moderate voltage criteria for LVH, may be normal variant Nonspecific T wave abnormality Abnormal ECG When compared with ECG of 20-NOV-2023 15:44, No significant change was found    CT head wo IV contrast    Result Date: 12/17/2023  Interpreted By:  Tr Colunga, STUDY: CT HEAD WO IV CONTRAST;  12/17/2023 1:12 am   INDICATION: Signs/Symptoms:confusion, weakness.   COMPARISON: Head CT dated 10/19/2022.   ACCESSION NUMBER(S): [id number]   ORDERING CLINICIAN: DHARA CHAO   TECHNIQUE: Axial noncontrast CT images of the head.   FINDINGS: Gray-white matter differentiation is maintained. There are no extra-axial collections. No mass effect or midline shift. There is no hydrocephalus. There is mild cerebral involutional change. There is a chronic left occipital infarct. There are chronic bilateral basal ganglia lacunar infarcts and scattered periventricular and deep white matter hypodensities suggestive of mild chronic microvascular ischemic change.   HEMORRHAGE: No acute intracranial hemorrhage.   CALVARIUM: No depressed skull fracture.   EXTRACRANIAL SOFT TISSUES:.. Unremarkable.   PARANASAL SINUSES/MASTOIDS: The visualized paranasal sinuses are well aerated. Mastoid air cells are clear.   ORBITS: Grossly normal.       Unchanged chronic left occipital infarct as well as mild chronic small-vessel ischemic changes.     Signed by: Tr Colunga 12/17/2023 1:32 AM Dictation workstation:   [id number]    XR foot left 1-2 views    Result Date: 12/17/2023  Interpreted By:  Tr Colunga, STUDY: XR FOOT LEFT 1-2 VIEWS; ;  12/17/2023 1:07 am   INDICATION: Signs/Symptoms:diabetic foot ulcer.   COMPARISON: Left foot radiographs dated 07/12/2018.   ACCESSION NUMBER(S): [id number]   ORDERING CLINICIAN: DHARA CHAO   FINDINGS: No evidence of acute fracture or dislocation. Chronic deformity and ghost tracks within the distal tibia and fibula related to previous fracture. There is similar appearance of 1st  metatarsophalangeal hallux valgus deformity and 2nd through 5th hammertoe deformities, which somewhat limits evaluation for focal abnormalities. There is extensive mid foot arthrosis with osseous fusion of the mid tarsal bones similar to prior..   There is diffuse edema around the left foot, without clear evidence of abnormal periosteal reaction or erosion.       Extensive degenerative changes in the left foot with chronic mid tarsal osseous fusion. No radiographic evidence of osseous erosion or abnormal periosteal reaction. Nonspecific diffuse soft tissue swelling, similar to prior.     MACRO: None   Signed by: Tr Colunga 12/17/2023 1:29 AM Dictation workstation:   [id number]    XR chest 1 view    Result Date: 12/17/2023  Interpreted By:  Tr Colunga, STUDY: XR CHEST 1 VIEW;  12/17/2023 1:07 am   INDICATION: Signs/Symptoms:sob.   COMPARISON: Correlation made with CT abdomen dated 11/20/2022.   ACCESSION NUMBER(S): [id number]   ORDERING CLINICIAN: DANIELA MULLEN   FINDINGS:   CARDIOMEDIASTINAL SILHOUETTE: Cardiomediastinal silhouette is mildly enlarged taking into account portable technique. Heavy coronary artery atherosclerotic calcifications better visualized on the CT scan.   LUNGS/PLEURA: There are no consolidations.There are no pleural effusions. There is no demonstrated pneumothorax.     BONES: No evidence of acute osseous abnormality.       1.  No evidence of acute cardiopulmonary process. Mild cardiomegaly and extensive coronary artery atherosclerotic calcifications.     Signed by: Tr Colunga 12/17/2023 1:25 AM Dictation workstation:   [id number]    Electrocardiogram, 12-lead PRN ACS symptoms    Result Date: 11/30/2023  Normal sinus rhythm Possible Left atrial enlargement Left ventricular hypertrophy Nonspecific T wave abnormality Abnormal ECG When compared with ECG of 20-NOV-2023 13:20, (unconfirmed) Nonspecific T wave abnormality has replaced inverted T waves in Lateral leads Confirmed by  Caterina Dixon ([id number]) on 11/30/2023 10:58:34 PM    ECG 12 lead    Result Date: 11/30/2023  Normal sinus rhythm Possible Left atrial enlargement Left ventricular hypertrophy Cannot rule out Septal infarct , age undetermined ST & T wave abnormality, consider lateral ischemia Abnormal ECG When compared with ECG of 19-OCT-2023 16:04, T wave inversion now evident in Lateral leads Confirmed by Caterina Dixon ([id number]) on 11/30/2023 10:55:06 PM    CT abdomen pelvis wo IV contrast    Result Date: 11/20/2023  Interpreted By:  Deacon Munoz, STUDY: CT ABDOMEN PELVIS WO IV CONTRAST;  11/20/2023 10:25 pm   INDICATION: Signs/Symptoms:epigastric/periumbilical abd pain w/ diarrhea.   COMPARISON: 8/28/2023   ACCESSION NUMBER(S): [id number]   ORDERING CLINICIAN: ARELIS VILLAGRAN   TECHNIQUE: Contiguous axial images of the abdomen and pelvis were obtained without intravenous contrast. Coronal and sagittal reformatted images were obtained from the axial images.   FINDINGS: There is limited evaluation of the lung bases. Mild basilar subsegmental atelectasis.   Cardiomegaly and coronary artery atherosclerotic calcifications.   Evaluation of the abdominal viscera is limited secondary to lack of intravenous contrast. Limited evaluation for liver mass on noncontrast examination. The gallbladder surgically absent.   The pancreas, spleen, and adrenal glands appear unremarkable.   Evaluation of the kidneys is limited secondary to lack of intravenous contrast. 2 mm nonobstructive right renal calculus. No hydronephrosis. Right renal vascular calcifications.   Atherosclerotic calcification of the abdominal aorta and bilateral iliac arteries.   No evidence of bowel obstruction or acute appendicitis. There is colonic diverticulosis without evidence of acute diverticulitis. There is underdistention versus wall thickening of the ascending colon. The colon is diffusely underdistended and not well evaluated.   Limited evaluation of the urinary bladder  is underdistention and beam hardening artifact.   Postsurgical change of right hip arthroplasty resulting in beam hardening artifact and limiting evaluation of the pelvis. There is stable advanced left hip osteoarthrosis with joint space loss and subchondral sclerosis and cystic change and femoral head neck junction osseous spurring.   Multilevel degenerative change of the lumbar spine. There is stable multilevel degenerative endplate change. Stable mild compression deformity of L1.       No evidence of bowel obstruction or acute appendicitis. Colon is diffusely underdistended and not well evaluated. There is underdistention versus wall thickening of the ascending colon which may be infectious or inflammatory in etiology. Colonic diverticulosis without evidence of acute diverticulitis.   Limited evaluation of the urinary bladder secondary to underdistention and beam hardening artifact. Underdistention versus mild urinary bladder wall thickening; please correlate with urinalysis to exclude cystitis.   MACRO: None   Signed by: Decaon Munoz 11/20/2023 11:26 PM Dictation workstation:   [id number]    XR chest 1 view    Result Date: 11/20/2023  STUDY: Chest Radiograph;  11/20/2023 3:08 PM. INDICATION: Weakness. COMPARISON: XR chest 10/19/2023. ACCESSION NUMBER(S): [id number] ORDERING CLINICIAN: BUSHRA NGUYEN TECHNIQUE:  Frontal chest was obtained at 15:07 hours. FINDINGS: CARDIOMEDIASTINAL SILHOUETTE: Cardiomediastinal silhouette is mildly enlarged in size. The aorta is calcified and tortuous.  LUNGS: Lungs are mildly hypoinflated, but appear clear.  ABDOMEN: No remarkable upper abdominal findings.  BONES: No acute osseous changes.    Stable mild enlargement of the cardiac silhouette. Mildly hypoinflated lungs which appear clear. Calcified and tortuous aorta. Signed by Alan Linton MD              Assessment/Plan        Principal Problem:    Acute hyperkalemia  Active Problems:    Lupus (CMS/HCC)    Lupus vasculitis  (CMS/[facility])    Hyperlipidemia    Advanced COPD (CMS/[facility])    Anemia    Atrial fibrillation (CMS/[facility])    CKD stage G3a/A2, GFR 45-59 and albumin creatinine ratio  mg/g (CMS/[facility] [place])    GERD (gastroesophageal reflux disease)    Benign essential HTN    Pulmonary hypertension (CMS/[facility])    Ulcer of foot, left, with unspecified severity (CMS/[facility])    DVT (deep venous thrombosis) (CMS/[facility])    DM type 2 with diabetic peripheral neuropathy (CMS/[facility])    Hyperkalemia    63-year-old female with PMH significant for T2DM, CKD 3 (baseline CR ~1.6), 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 Patient also found to have UTI with culture resulting in Serratia >100k.     #UTI - improving  #Acute toxic encephalopathy  - continue rocephin  - 12/18 urine culture likely contaminated with multiple organisms.  - currently A/Ox2 to person and place  - CTM for acute clinical changes    #Acute hyperkalemia- resolved  -S/p Kayexalate oral suspension 15 g, and calcium gluconate in the ED  -Albuterol nebulized solution one-time dose   -Continue to monitor RFP  -Avoid nephrotoxic agents  -EKG without signs of hyperkalemia  -Continue telemetry      #Hypoglycemia in setting of DM2:  -Resolved prior to presentation  -Continue SSI #1  -Continue to monitor blood glucose  -Hypoglycemia protocol in place     #Paroxysmal A-fib:  -Home Eliquis continued     SLE  HTN  COPD  GERD  -Continue home medications as appropriate  IVF: D5/.45NS  Diet: Renal + diabetic  DVT prophylaxis: Home Eliquis continued  Dispo: Pending improvement in sodium, potassium and mental status  Consults: None     CODE STATUS: DNR/DNI    Discussed with attending physician,  Mynor Jennings D.O.  PGY-1 Internal medicine resident

## 2023-12-18 NOTE — PROGRESS NOTES
Occupational Therapy                 Therapy Communication Note    Patient Name: Bing Holliday  MRN: [id number]  Today's Date: 12/18/2023     Discipline: Occupational Therapy    Missed Visit Reason: Missed Visit Reason:  (pt was in need of nursing care)    Missed Time: Attempt    Comment: Received orders or OT eval 12/17. Completed chart review, and started OT eval. Pt was on bedpan and required nursing assist at this time. Will hold OT eval and complete as appropriate.

## 2023-12-19 DIAGNOSIS — M32.9 SYSTEMIC LUPUS ERYTHEMATOSUS, UNSPECIFIED SLE TYPE, UNSPECIFIED ORGAN INVOLVEMENT STATUS (MULTI): Primary | Chronic | ICD-10-CM

## 2023-12-19 LAB
ACANTHOCYTES BLD QL SMEAR: NORMAL
ALBUMIN SERPL BCP-MCNC: 3 G/DL (ref 3.4–5)
ANION GAP SERPL CALC-SCNC: 12 MMOL/L (ref 10–20)
APTT PPP: 40 SECONDS (ref 27–38)
BASOPHILS # BLD AUTO: 0.02 X10*3/UL (ref 0–0.1)
BASOPHILS NFR BLD AUTO: 0.3 %
BUN SERPL-MCNC: 36 MG/DL (ref 6–23)
CALCIUM SERPL-MCNC: 8.5 MG/DL (ref 8.6–10.3)
CHLORIDE SERPL-SCNC: 111 MMOL/L (ref 98–107)
CO2 SERPL-SCNC: 28 MMOL/L (ref 21–32)
CREAT SERPL-MCNC: 1.95 MG/DL (ref 0.5–1.05)
DACRYOCYTES BLD QL SMEAR: NORMAL
EOSINOPHIL # BLD AUTO: 0.03 X10*3/UL (ref 0–0.7)
EOSINOPHIL NFR BLD AUTO: 0.4 %
ERYTHROCYTE [DISTWIDTH] IN BLOOD BY AUTOMATED COUNT: 21.2 % (ref 11.5–14.5)
GFR SERPL CREATININE-BSD FRML MDRD: 29 ML/MIN/1.73M*2
GLUCOSE BLD MANUAL STRIP-MCNC: 119 MG/DL (ref 74–99)
GLUCOSE BLD MANUAL STRIP-MCNC: 126 MG/DL (ref 74–99)
GLUCOSE BLD MANUAL STRIP-MCNC: 184 MG/DL (ref 74–99)
GLUCOSE BLD MANUAL STRIP-MCNC: 193 MG/DL (ref 74–99)
GLUCOSE BLD MANUAL STRIP-MCNC: 207 MG/DL (ref 74–99)
GLUCOSE BLD MANUAL STRIP-MCNC: 55 MG/DL (ref 74–99)
GLUCOSE BLD MANUAL STRIP-MCNC: 91 MG/DL (ref 74–99)
GLUCOSE SERPL-MCNC: 125 MG/DL (ref 74–99)
HCT VFR BLD AUTO: 28.2 % (ref 36–46)
HGB BLD-MCNC: 8 G/DL (ref 12–16)
IMM GRANULOCYTES # BLD AUTO: 0.22 X10*3/UL (ref 0–0.7)
IMM GRANULOCYTES NFR BLD AUTO: 3.3 % (ref 0–0.9)
INR PPP: 1.1 (ref 0.9–1.1)
LYMPHOCYTES # BLD AUTO: 1.21 X10*3/UL (ref 1.2–4.8)
LYMPHOCYTES NFR BLD AUTO: 18.1 %
MAGNESIUM SERPL-MCNC: 2.02 MG/DL (ref 1.6–2.4)
MCH RBC QN AUTO: 28.8 PG (ref 26–34)
MCHC RBC AUTO-ENTMCNC: 28.4 G/DL (ref 32–36)
MCV RBC AUTO: 101 FL (ref 80–100)
MONOCYTES # BLD AUTO: 0.5 X10*3/UL (ref 0.1–1)
MONOCYTES NFR BLD AUTO: 7.5 %
NEUTROPHILS # BLD AUTO: 4.71 X10*3/UL (ref 1.2–7.7)
NEUTROPHILS NFR BLD AUTO: 70.4 %
NRBC BLD-RTO: 1.3 /100 WBCS (ref 0–0)
OVALOCYTES BLD QL SMEAR: NORMAL
PHOSPHATE SERPL-MCNC: 3.2 MG/DL (ref 2.5–4.9)
PLATELET # BLD AUTO: 91 X10*3/UL (ref 150–450)
POLYCHROMASIA BLD QL SMEAR: NORMAL
POTASSIUM SERPL-SCNC: 4.7 MMOL/L (ref 3.5–5.3)
PROTHROMBIN TIME: 12.5 SECONDS (ref 9.8–12.8)
RBC # BLD AUTO: 2.78 X10*6/UL (ref 4–5.2)
RBC MORPH BLD: NORMAL
SCHISTOCYTES BLD QL SMEAR: NORMAL
SODIUM SERPL-SCNC: 146 MMOL/L (ref 136–145)
TARGETS BLD QL SMEAR: NORMAL
WBC # BLD AUTO: 6.7 X10*3/UL (ref 4.4–11.3)

## 2023-12-19 PROCEDURE — 80069 RENAL FUNCTION PANEL: CPT

## 2023-12-19 PROCEDURE — 2500000001 HC RX 250 WO HCPCS SELF ADMINISTERED DRUGS (ALT 637 FOR MEDICARE OP)

## 2023-12-19 PROCEDURE — 99233 SBSQ HOSP IP/OBS HIGH 50: CPT

## 2023-12-19 PROCEDURE — 2500000005 HC RX 250 GENERAL PHARMACY W/O HCPCS: Performed by: STUDENT IN AN ORGANIZED HEALTH CARE EDUCATION/TRAINING PROGRAM

## 2023-12-19 PROCEDURE — 85610 PROTHROMBIN TIME: CPT

## 2023-12-19 PROCEDURE — 2500000004 HC RX 250 GENERAL PHARMACY W/ HCPCS (ALT 636 FOR OP/ED)

## 2023-12-19 PROCEDURE — 2500000005 HC RX 250 GENERAL PHARMACY W/O HCPCS

## 2023-12-19 PROCEDURE — 36415 COLL VENOUS BLD VENIPUNCTURE: CPT

## 2023-12-19 PROCEDURE — 093K7ZZ CONTROL BLEEDING IN NASAL MUCOSA AND SOFT TISSUE, VIA NATURAL OR ARTIFICIAL OPENING: ICD-10-PCS

## 2023-12-19 PROCEDURE — 99222 1ST HOSP IP/OBS MODERATE 55: CPT | Performed by: OTOLARYNGOLOGY

## 2023-12-19 PROCEDURE — 97161 PT EVAL LOW COMPLEX 20 MIN: CPT | Mod: GP

## 2023-12-19 PROCEDURE — 97530 THERAPEUTIC ACTIVITIES: CPT | Mod: GO

## 2023-12-19 PROCEDURE — 1200000002 HC GENERAL ROOM WITH TELEMETRY DAILY

## 2023-12-19 PROCEDURE — 82947 ASSAY GLUCOSE BLOOD QUANT: CPT

## 2023-12-19 PROCEDURE — 97165 OT EVAL LOW COMPLEX 30 MIN: CPT | Mod: GO

## 2023-12-19 PROCEDURE — 2500000001 HC RX 250 WO HCPCS SELF ADMINISTERED DRUGS (ALT 637 FOR MEDICARE OP): Performed by: STUDENT IN AN ORGANIZED HEALTH CARE EDUCATION/TRAINING PROGRAM

## 2023-12-19 PROCEDURE — 96372 THER/PROPH/DIAG INJ SC/IM: CPT

## 2023-12-19 PROCEDURE — 83735 ASSAY OF MAGNESIUM: CPT

## 2023-12-19 PROCEDURE — 85025 COMPLETE CBC W/AUTO DIFF WBC: CPT

## 2023-12-19 PROCEDURE — 97530 THERAPEUTIC ACTIVITIES: CPT | Mod: GP

## 2023-12-19 RX ORDER — LIDOCAINE HYDROCHLORIDE AND EPINEPHRINE 10; 10 MG/ML; UG/ML
5 INJECTION, SOLUTION INFILTRATION; PERINEURAL ONCE
Status: COMPLETED | OUTPATIENT
Start: 2023-12-19 | End: 2023-12-19

## 2023-12-19 RX ORDER — RISPERIDONE 0.5 MG/1
0.5 TABLET ORAL NIGHTLY
Status: DISCONTINUED | OUTPATIENT
Start: 2023-12-19 | End: 2023-12-23 | Stop reason: HOSPADM

## 2023-12-19 RX ORDER — BELIMUMAB 200 MG/ML
200 SOLUTION SUBCUTANEOUS
Qty: 4 ML | Refills: 3 | Status: SHIPPED | OUTPATIENT
Start: 2023-12-19 | End: 2023-12-20 | Stop reason: ALTCHOICE

## 2023-12-19 RX ORDER — IPRATROPIUM BROMIDE AND ALBUTEROL SULFATE 2.5; .5 MG/3ML; MG/3ML
3 SOLUTION RESPIRATORY (INHALATION) EVERY 4 HOURS PRN
Status: DISCONTINUED | OUTPATIENT
Start: 2023-12-19 | End: 2023-12-23 | Stop reason: HOSPADM

## 2023-12-19 RX ORDER — SODIUM CHLORIDE 450 MG/100ML
100 INJECTION, SOLUTION INTRAVENOUS CONTINUOUS
Status: ACTIVE | OUTPATIENT
Start: 2023-12-19 | End: 2023-12-20

## 2023-12-19 RX ORDER — LIDOCAINE HYDROCHLORIDE AND EPINEPHRINE BITARTRATE 20; .01 MG/ML; MG/ML
5 INJECTION, SOLUTION SUBCUTANEOUS ONCE
Status: COMPLETED | OUTPATIENT
Start: 2023-12-19 | End: 2023-12-19

## 2023-12-19 RX ORDER — LORAZEPAM 2 MG/ML
0.5 INJECTION INTRAMUSCULAR ONCE
Status: COMPLETED | OUTPATIENT
Start: 2023-12-19 | End: 2023-12-19

## 2023-12-19 RX ORDER — TRANEXAMIC ACID 100 MG/ML
1000 INJECTION, SOLUTION INTRAVENOUS ONCE
Status: COMPLETED | OUTPATIENT
Start: 2023-12-19 | End: 2023-12-19

## 2023-12-19 RX ORDER — OXYMETAZOLINE HCL 0.05 %
2 SPRAY, NON-AEROSOL (ML) NASAL ONCE
Status: COMPLETED | OUTPATIENT
Start: 2023-12-19 | End: 2023-12-19

## 2023-12-19 RX ADMIN — SODIUM CHLORIDE 100 ML/HR: 4.5 INJECTION, SOLUTION INTRAVENOUS at 14:31

## 2023-12-19 RX ADMIN — CEFTRIAXONE SODIUM 1 G: 1 INJECTION, SOLUTION INTRAVENOUS at 08:44

## 2023-12-19 RX ADMIN — NASAL DECONGESTANT 2 SPRAY: 0.05 SPRAY NASAL at 04:30

## 2023-12-19 RX ADMIN — PREDNISONE 10 MG: 5 TABLET ORAL at 08:40

## 2023-12-19 RX ADMIN — ATORVASTATIN CALCIUM 40 MG: 40 TABLET, FILM COATED ORAL at 08:40

## 2023-12-19 RX ADMIN — LORAZEPAM 0.5 MG: 2 INJECTION, SOLUTION INTRAMUSCULAR; INTRAVENOUS at 04:30

## 2023-12-19 RX ADMIN — DEXTROSE MONOHYDRATE 25 G: 25 INJECTION, SOLUTION INTRAVENOUS at 02:48

## 2023-12-19 RX ADMIN — LIDOCAINE HYDROCHLORIDE AND EPINEPHRINE BITARTRATE 5 ML: 20; .01 INJECTION, SOLUTION SUBCUTANEOUS at 03:15

## 2023-12-19 RX ADMIN — NASAL DECONGESTANT 2 SPRAY: 0.05 SPRAY NASAL at 03:15

## 2023-12-19 RX ADMIN — TRANEXAMIC ACID 1000 MG: 1 INJECTION, SOLUTION INTRAVENOUS at 04:30

## 2023-12-19 RX ADMIN — LIDOCAINE HYDROCHLORIDE,EPINEPHRINE BITARTRATE 5 ML: 10; .01 INJECTION, SOLUTION INFILTRATION; PERINEURAL at 04:30

## 2023-12-19 RX ADMIN — TRANEXAMIC ACID 1000 MG: 1 INJECTION, SOLUTION INTRAVENOUS at 03:15

## 2023-12-19 RX ADMIN — POLYETHYLENE GLYCOL 3350 17 G: 17 POWDER, FOR SOLUTION ORAL at 09:00

## 2023-12-19 RX ADMIN — MYCOPHENOLATE MOFETIL 1000 MG: 250 CAPSULE ORAL at 21:41

## 2023-12-19 RX ADMIN — Medication 400 MG: at 08:40

## 2023-12-19 RX ADMIN — MYCOPHENOLATE MOFETIL 1000 MG: 250 CAPSULE ORAL at 08:44

## 2023-12-19 RX ADMIN — RISPERIDONE 0.5 MG: 0.5 TABLET ORAL at 21:41

## 2023-12-19 RX ADMIN — Medication 5 MG: at 21:40

## 2023-12-19 RX ADMIN — PANTOPRAZOLE SODIUM 40 MG: 40 TABLET, DELAYED RELEASE ORAL at 08:41

## 2023-12-19 RX ADMIN — AMLODIPINE BESYLATE 2.5 MG: 2.5 TABLET ORAL at 08:41

## 2023-12-19 ASSESSMENT — PAIN SCALES - GENERAL
PAINLEVEL_OUTOF10: 0 - NO PAIN
PAINLEVEL_OUTOF10: 5 - MODERATE PAIN
PAINLEVEL_OUTOF10: 0 - NO PAIN

## 2023-12-19 ASSESSMENT — COGNITIVE AND FUNCTIONAL STATUS - GENERAL
PERSONAL GROOMING: A LOT
MOVING FROM LYING ON BACK TO SITTING ON SIDE OF FLAT BED WITH BEDRAILS: A LOT
TURNING FROM BACK TO SIDE WHILE IN FLAT BAD: A LOT
MOVING TO AND FROM BED TO CHAIR: A LOT
HELP NEEDED FOR BATHING: A LOT
MOBILITY SCORE: 11
DAILY ACTIVITIY SCORE: 11
TURNING FROM BACK TO SIDE WHILE IN FLAT BAD: A LOT
WALKING IN HOSPITAL ROOM: A LOT
MOBILITY SCORE: 11
TOILETING: TOTAL
DRESSING REGULAR UPPER BODY CLOTHING: A LOT
HELP NEEDED FOR BATHING: A LOT
EATING MEALS: A LITTLE
TOILETING: TOTAL
PERSONAL GROOMING: A LOT
STANDING UP FROM CHAIR USING ARMS: A LOT
MOVING TO AND FROM BED TO CHAIR: A LOT
CLIMB 3 TO 5 STEPS WITH RAILING: TOTAL
DRESSING REGULAR LOWER BODY CLOTHING: TOTAL
MOVING FROM LYING ON BACK TO SITTING ON SIDE OF FLAT BED WITH BEDRAILS: A LOT
STANDING UP FROM CHAIR USING ARMS: A LOT
CLIMB 3 TO 5 STEPS WITH RAILING: TOTAL
EATING MEALS: A LITTLE
DRESSING REGULAR UPPER BODY CLOTHING: A LOT
DAILY ACTIVITIY SCORE: 11
WALKING IN HOSPITAL ROOM: A LOT
DRESSING REGULAR LOWER BODY CLOTHING: TOTAL

## 2023-12-19 ASSESSMENT — ACTIVITIES OF DAILY LIVING (ADL)
ADL_ASSISTANCE: INDEPENDENT
BATHING_ASSISTANCE: MAXIMAL
ADL_ASSISTANCE: INDEPENDENT

## 2023-12-19 ASSESSMENT — PAIN - FUNCTIONAL ASSESSMENT
PAIN_FUNCTIONAL_ASSESSMENT: 0-10

## 2023-12-19 NOTE — PROGRESS NOTES
Bing Holliday is a 63 y.o. female on day 0 of admission presenting with Acute hyperkalemia.      Subjective   Overnight patient had R nare epistaxis requiring nasal packing that she attempted to remove prompting requiring of physical and chemical restraint. She continues to improve mentally, but lacks medical capacity at this time. She is now A/O to person, place, and time but is unable to state why she is in the hospital or even recall the evens of the night prior with her nose bleed.        Objective     Last Recorded Vitals  /79 (BP Location: Left arm, Patient Position: Lying)   Pulse 65   Temp 35.7 °C (96.3 °F) (Temporal)   Resp 17   Wt 93.4 kg (205 lb 14.6 oz)   SpO2 94%   Intake/Output last 3 Shifts:    Intake/Output Summary (Last 24 hours) at 12/19/2023 1549  Last data filed at 12/18/2023 1809  Gross per 24 hour   Intake 805 ml   Output --   Net 805 ml         Admission Weight  Weight: 105 kg (231 lb 7.7 oz) (12/16/23 2348)    Daily Weight  12/18/23 : 93.4 kg (205 lb 14.6 oz)    Image Results  ECG 12 lead  Marked sinus bradycardia  Moderate voltage criteria for LVH, may be normal variant  Nonspecific T wave abnormality  Borderline ECG  When compared with ECG of 20-NOV-2023 15:44,  No significant change was found  Confirmed by Cheko Mike ([id number]) on 12/18/2023 9:46:20 PM      Physical Exam  Vitals reviewed.   Constitutional:       General: She is not in acute distress.     Appearance: Normal appearance. She is not ill-appearing, toxic-appearing or diaphoretic.   HENT:      Head: Normocephalic and atraumatic.      Right Ear: External ear normal.      Left Ear: External ear normal.      Nose:      Comments: Dried blood present on nares. There is nasal packing present within the R nare.      Mouth/Throat:      Mouth: Mucous membranes are moist.      Pharynx: Oropharynx is clear.   Eyes:      Extraocular Movements: Extraocular movements intact.      Pupils: Pupils are equal, round, and reactive  to light.   Cardiovascular:      Rate and Rhythm: Normal rate and regular rhythm.      Pulses: Normal pulses.      Heart sounds: Normal heart sounds. No murmur heard.     No friction rub. No gallop.   Pulmonary:      Effort: Pulmonary effort is normal. No respiratory distress.      Breath sounds: Normal breath sounds. No wheezing, rhonchi or rales.   Abdominal:      General: Abdomen is flat. Bowel sounds are normal. There is no distension.      Palpations: Abdomen is soft. There is no mass.      Tenderness: There is no abdominal tenderness.      Hernia: No hernia is present.   Musculoskeletal:         General: Normal range of motion.      Cervical back: Normal range of motion and neck supple.   Skin:     General: Skin is warm and dry.      Capillary Refill: Capillary refill takes less than 2 seconds.   Neurological:      General: No focal deficit present.      Mental Status: She is oriented to person, place, and time.      Comments: A/Ox2 to person and place   Psychiatric:         Mood and Affect: Mood normal.         Behavior: Behavior normal.         Relevant Results    Scheduled medications  amLODIPine, 2.5 mg, oral, Daily  [Held by provider] apixaban, 2.5 mg, oral, BID  atorvastatin, 40 mg, oral, Daily  budesonide, 0.5 mg, nebulization, BID  cefTRIAXone, 1 g, intravenous, Daily  formoterol, 20 mcg, nebulization, BID  magnesium oxide, 400 mg, oral, Daily  melatonin, 5 mg, oral, Nightly  mycophenolate, 1,000 mg, oral, BID  pantoprazole, 40 mg, oral, Daily  polyethylene glycol, 17 g, oral, Daily  predniSONE, 10 mg, oral, q AM  predniSONE, 5 mg, oral, See admin instructions  [Held by provider] torsemide, 10 mg, oral, Once per day on Mon Wed Fri      Continuous medications  sodium chloride, 100 mL/hr, Last Rate: 100 mL/hr (12/19/23 1431)      PRN medications  PRN medications: acetaminophen, dextrose 10 % in water (D10W), dextrose, glucagon, ipratropium-albuteroL  Results for orders placed or performed during the  hospital encounter of 12/16/23 (from the past 24 hour(s))   POCT GLUCOSE   Result Value Ref Range    POCT Glucose 154 (H) 74 - 99 mg/dL   POCT GLUCOSE   Result Value Ref Range    POCT Glucose 184 (H) 74 - 99 mg/dL   POCT GLUCOSE   Result Value Ref Range    POCT Glucose 55 (L) 74 - 99 mg/dL   POCT GLUCOSE   Result Value Ref Range    POCT Glucose 119 (H) 74 - 99 mg/dL   POCT GLUCOSE   Result Value Ref Range    POCT Glucose 91 74 - 99 mg/dL   POCT GLUCOSE   Result Value Ref Range    POCT Glucose 126 (H) 74 - 99 mg/dL   CBC and Auto Differential   Result Value Ref Range    WBC 6.7 4.4 - 11.3 x10*3/uL    nRBC 1.3 (H) 0.0 - 0.0 /100 WBCs    RBC 2.78 (L) 4.00 - 5.20 x10*6/uL    Hemoglobin 8.0 (L) 12.0 - 16.0 g/dL    Hematocrit 28.2 (L) 36.0 - 46.0 %     (H) 80 - 100 fL    MCH 28.8 26.0 - 34.0 pg    MCHC 28.4 (L) 32.0 - 36.0 g/dL    RDW 21.2 (H) 11.5 - 14.5 %    Platelets 91 (L) 150 - 450 x10*3/uL    Neutrophils % 70.4 40.0 - 80.0 %    Immature Granulocytes %, Automated 3.3 (H) 0.0 - 0.9 %    Lymphocytes % 18.1 13.0 - 44.0 %    Monocytes % 7.5 2.0 - 10.0 %    Eosinophils % 0.4 0.0 - 6.0 %    Basophils % 0.3 0.0 - 2.0 %    Neutrophils Absolute 4.71 1.20 - 7.70 x10*3/uL    Immature Granulocytes Absolute, Automated 0.22 0.00 - 0.70 x10*3/uL    Lymphocytes Absolute 1.21 1.20 - 4.80 x10*3/uL    Monocytes Absolute 0.50 0.10 - 1.00 x10*3/uL    Eosinophils Absolute 0.03 0.00 - 0.70 x10*3/uL    Basophils Absolute 0.02 0.00 - 0.10 x10*3/uL   Renal function panel   Result Value Ref Range    Glucose 125 (H) 74 - 99 mg/dL    Sodium 146 (H) 136 - 145 mmol/L    Potassium 4.7 3.5 - 5.3 mmol/L    Chloride 111 (H) 98 - 107 mmol/L    Bicarbonate 28 21 - 32 mmol/L    Anion Gap 12 10 - 20 mmol/L    Urea Nitrogen 36 (H) 6 - 23 mg/dL    Creatinine 1.95 (H) 0.50 - 1.05 mg/dL    eGFR 29 (L) >60 mL/min/1.73m*2    Calcium 8.5 (L) 8.6 - 10.3 mg/dL    Phosphorus 3.2 2.5 - 4.9 mg/dL    Albumin 3.0 (L) 3.4 - 5.0 g/dL   Magnesium   Result Value Ref  Range    Magnesium 2.02 1.60 - 2.40 mg/dL   Coagulation Screen   Result Value Ref Range    Protime 12.5 9.8 - 12.8 seconds    INR 1.1 0.9 - 1.1    aPTT 40 (H) 27 - 38 seconds   Morphology   Result Value Ref Range    RBC Morphology See Below     Polychromasia Mild     RBC Fragments Few     Target Cells Few     Ovalocytes Few     Teardrop Cells Few     Acanthocytes Few    POCT GLUCOSE   Result Value Ref Range    POCT Glucose 207 (H) 74 - 99 mg/dL     ECG 12 lead    Result Date: 12/18/2023  Marked sinus bradycardia Moderate voltage criteria for LVH, may be normal variant Nonspecific T wave abnormality Abnormal ECG When compared with ECG of 20-NOV-2023 15:44, No significant change was found    CT head wo IV contrast    Result Date: 12/17/2023  Interpreted By:  Tr Colunga, STUDY: CT HEAD WO IV CONTRAST;  12/17/2023 1:12 am   INDICATION: Signs/Symptoms:confusion, weakness.   COMPARISON: Head CT dated 10/19/2022.   ACCESSION NUMBER(S): [id number]   ORDERING CLINICIAN: DHARA CHAO   TECHNIQUE: Axial noncontrast CT images of the head.   FINDINGS: Gray-white matter differentiation is maintained. There are no extra-axial collections. No mass effect or midline shift. There is no hydrocephalus. There is mild cerebral involutional change. There is a chronic left occipital infarct. There are chronic bilateral basal ganglia lacunar infarcts and scattered periventricular and deep white matter hypodensities suggestive of mild chronic microvascular ischemic change.   HEMORRHAGE: No acute intracranial hemorrhage.   CALVARIUM: No depressed skull fracture.   EXTRACRANIAL SOFT TISSUES:.. Unremarkable.   PARANASAL SINUSES/MASTOIDS: The visualized paranasal sinuses are well aerated. Mastoid air cells are clear.   ORBITS: Grossly normal.       Unchanged chronic left occipital infarct as well as mild chronic small-vessel ischemic changes.     Signed by: Tr Colunga 12/17/2023 1:32 AM Dictation workstation:   [id number]    XR foot  left 1-2 views    Result Date: 12/17/2023  Interpreted By:  Tr Colunga, STUDY: XR FOOT LEFT 1-2 VIEWS; ;  12/17/2023 1:07 am   INDICATION: Signs/Symptoms:diabetic foot ulcer.   COMPARISON: Left foot radiographs dated 07/12/2018.   ACCESSION NUMBER(S): [id number]   ORDERING CLINICIAN: DHARA CHAO   FINDINGS: No evidence of acute fracture or dislocation. Chronic deformity and ghost tracks within the distal tibia and fibula related to previous fracture. There is similar appearance of 1st metatarsophalangeal hallux valgus deformity and 2nd through 5th hammertoe deformities, which somewhat limits evaluation for focal abnormalities. There is extensive mid foot arthrosis with osseous fusion of the mid tarsal bones similar to prior..   There is diffuse edema around the left foot, without clear evidence of abnormal periosteal reaction or erosion.       Extensive degenerative changes in the left foot with chronic mid tarsal osseous fusion. No radiographic evidence of osseous erosion or abnormal periosteal reaction. Nonspecific diffuse soft tissue swelling, similar to prior.     MACRO: None   Signed by: Tr Colunga 12/17/2023 1:29 AM Dictation workstation:   [id number]    XR chest 1 view    Result Date: 12/17/2023  Interpreted By:  Tr Colunga, STUDY: XR CHEST 1 VIEW;  12/17/2023 1:07 am   INDICATION: Signs/Symptoms:sob.   COMPARISON: Correlation made with CT abdomen dated 11/20/2022.   ACCESSION NUMBER(S): [id number]   ORDERING CLINICIAN: DANIELA MULLEN   FINDINGS:   CARDIOMEDIASTINAL SILHOUETTE: Cardiomediastinal silhouette is mildly enlarged taking into account portable technique. Heavy coronary artery atherosclerotic calcifications better visualized on the CT scan.   LUNGS/PLEURA: There are no consolidations.There are no pleural effusions. There is no demonstrated pneumothorax.     BONES: No evidence of acute osseous abnormality.       1.  No evidence of acute cardiopulmonary process. Mild cardiomegaly and  extensive coronary artery atherosclerotic calcifications.     Signed by: Tr Colunga 12/17/2023 1:25 AM Dictation workstation:   [id number]    Electrocardiogram, 12-lead PRN ACS symptoms    Result Date: 11/30/2023  Normal sinus rhythm Possible Left atrial enlargement Left ventricular hypertrophy Nonspecific T wave abnormality Abnormal ECG When compared with ECG of 20-NOV-2023 13:20, (unconfirmed) Nonspecific T wave abnormality has replaced inverted T waves in Lateral leads Confirmed by Caterina Dixon ([id number]) on 11/30/2023 10:58:34 PM    ECG 12 lead    Result Date: 11/30/2023  Normal sinus rhythm Possible Left atrial enlargement Left ventricular hypertrophy Cannot rule out Septal infarct , age undetermined ST & T wave abnormality, consider lateral ischemia Abnormal ECG When compared with ECG of 19-OCT-2023 16:04, T wave inversion now evident in Lateral leads Confirmed by Caterina Dixon ([id number]) on 11/30/2023 10:55:06 PM    CT abdomen pelvis wo IV contrast    Result Date: 11/20/2023  Interpreted By:  Deacon Munoz, STUDY: CT ABDOMEN PELVIS WO IV CONTRAST;  11/20/2023 10:25 pm   INDICATION: Signs/Symptoms:epigastric/periumbilical abd pain w/ diarrhea.   COMPARISON: 8/28/2023   ACCESSION NUMBER(S): [id number]   ORDERING CLINICIAN: ARELIS VILLAGRAN   TECHNIQUE: Contiguous axial images of the abdomen and pelvis were obtained without intravenous contrast. Coronal and sagittal reformatted images were obtained from the axial images.   FINDINGS: There is limited evaluation of the lung bases. Mild basilar subsegmental atelectasis.   Cardiomegaly and coronary artery atherosclerotic calcifications.   Evaluation of the abdominal viscera is limited secondary to lack of intravenous contrast. Limited evaluation for liver mass on noncontrast examination. The gallbladder surgically absent.   The pancreas, spleen, and adrenal glands appear unremarkable.   Evaluation of the kidneys is limited secondary to lack of intravenous  contrast. 2 mm nonobstructive right renal calculus. No hydronephrosis. Right renal vascular calcifications.   Atherosclerotic calcification of the abdominal aorta and bilateral iliac arteries.   No evidence of bowel obstruction or acute appendicitis. There is colonic diverticulosis without evidence of acute diverticulitis. There is underdistention versus wall thickening of the ascending colon. The colon is diffusely underdistended and not well evaluated.   Limited evaluation of the urinary bladder is underdistention and beam hardening artifact.   Postsurgical change of right hip arthroplasty resulting in beam hardening artifact and limiting evaluation of the pelvis. There is stable advanced left hip osteoarthrosis with joint space loss and subchondral sclerosis and cystic change and femoral head neck junction osseous spurring.   Multilevel degenerative change of the lumbar spine. There is stable multilevel degenerative endplate change. Stable mild compression deformity of L1.       No evidence of bowel obstruction or acute appendicitis. Colon is diffusely underdistended and not well evaluated. There is underdistention versus wall thickening of the ascending colon which may be infectious or inflammatory in etiology. Colonic diverticulosis without evidence of acute diverticulitis.   Limited evaluation of the urinary bladder secondary to underdistention and beam hardening artifact. Underdistention versus mild urinary bladder wall thickening; please correlate with urinalysis to exclude cystitis.   MACRO: None   Signed by: Deacon Munoz 11/20/2023 11:26 PM Dictation workstation:   [id number]    XR chest 1 view    Result Date: 11/20/2023  STUDY: Chest Radiograph;  11/20/2023 3:08 PM. INDICATION: Weakness. COMPARISON: XR chest 10/19/2023. ACCESSION NUMBER(S): [id number] ORDERING CLINICIAN: BUSHRA NGUYEN TECHNIQUE:  Frontal chest was obtained at 15:07 hours. FINDINGS: CARDIOMEDIASTINAL SILHOUETTE: Cardiomediastinal  silhouette is mildly enlarged in size. The aorta is calcified and tortuous.  LUNGS: Lungs are mildly hypoinflated, but appear clear.  ABDOMEN: No remarkable upper abdominal findings.  BONES: No acute osseous changes.    Stable mild enlargement of the cardiac silhouette. Mildly hypoinflated lungs which appear clear. Calcified and tortuous aorta. Signed by Alan Linton MD              Assessment/Plan        Principal Problem:    Acute hyperkalemia  Active Problems:    Lupus (CMS/HCC)    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HCC)    Hyperlipidemia    Advanced COPD (CMS/HCC)    Anemia    Atrial fibrillation (CMS/[facility])    CKD stage G3a/A2, GFR 45-59 and albumin creatinine ratio  mg/g (CMS/HCC)    GERD (gastroesophageal reflux disease)    Benign essential HTN    Pulmonary hypertension (CMS/[facility])    Ulcer of foot, left, with unspecified severity (CMS/[facility])    DVT (deep venous thrombosis) (CMS/[facility])    DM type 2 with diabetic peripheral neuropathy (CMS/[facility])    Hyperkalemia    63-year-old female with PMH significant for T2DM, CKD 3 (baseline CR ~1.6), 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 Patient also found to have UTI with culture resulting in Serratia >100k.     #UTI - improving  #Acute toxic encephalopathy  - continue rocephin  - 12/18 urine culture likely contaminated with multiple organisms.  - will continue to treat based on prior urine culture with positive serratia  - currently A/Ox3 to person, place, and time. Continues to lack medical capacity  - CTM for acute clinical changes    #Epistaxis  - R nare bleed.   - packing in place  - hold eliquis  - can use afrin for breakthrough bleeding per ENT  - ENT on consult, recommend 7 days of abx, and follow up as outpatient in 7 days.    #Acute  hyperkalemia- resolved  -S/p Kayexalate oral suspension 15 g, and calcium gluconate in the ED  -Albuterol nebulized solution one-time dose   -Continue to monitor RFP  -Avoid nephrotoxic agents  -EKG without signs of hyperkalemia  -Continue telemetry      #Hypoglycemia in setting of DM2:  -Resolved prior to presentation  -Continue SSI #1  -Continue to monitor blood glucose  -Hypoglycemia protocol in place     #Paroxysmal A-fib:  -Home Eliquis continued     SLE  HTN  COPD  GERD  -Continue home medications as appropriate  IVF: .45NS  Diet: Renal + diabetic  DVT prophylaxis: Home Eliquis held 2/2 epistaxis  Dispo: Pending improvement in sodium, potassium and mental status  Consults: ENT     CODE STATUS: DNR/DNI    Discussed with attending physician,  Mynor Jennings D.O.  PGY-1 Internal medicine resident

## 2023-12-19 NOTE — PROGRESS NOTES
Physical Therapy    Physical Therapy    Physical Therapy Evaluation    Patient Name: Bing Holliday  MRN: [id number]  Today's Date: 12/19/2023   Time Calculation  Start Time: 1146  Stop Time: 1218  Time Calculation (min): 32 min    Assessment/Plan   PT Assessment  PT Assessment Results: Decreased strength, Decreased endurance, Impaired balance, Decreased mobility, Decreased safety awareness, Pain, Impaired judgement, Decreased cognition  Rehab Prognosis: Good  Evaluation/Treatment Tolerance: Patient limited by fatigue  Medical Staff Made Aware: Yes  Barriers to Participation: Ability to acquire knowledge  End of Session Communication: Bedside nurse  Assessment Comment: Pt presents today below baseline level of function and requires continued PT during hospital stay. Pt requires 24 hour physical assist for all mobility to prevent falls. Pt is unsafe to navigate home environment at this time with available support and assist. Pt requires PT at a moderate intensity level at discharge to maximize functional mobility and safety.    End of Session Patient Position: Alarm on, Bed, 3 rail up  IP OR SWING BED PT PLAN  Inpatient or Swing Bed: Inpatient  PT Plan  Treatment/Interventions: Bed mobility, Transfer training, Gait training, Balance training, Neuromuscular re-education, Strengthening, Endurance training, Range of motion, Therapeutic exercise, Therapeutic activity, Home exercise program  PT Plan: Skilled PT  PT Frequency: 3 times per week  PT Discharge Recommendations: Moderate intensity level of continued care  PT Recommended Transfer Status: Assist x2 (FWW, gait belt)  PT - OK to Discharge: Yes (To next level of care when cleared by medical team   )    Subjective     General Visit Information:  General  Reason for Referral: impaired mobility  Referred By: Landen Mcduffie MD  Past Medical History Relevant to Rehab: admitted 12/16 for hyperkalemia, hypoglycemia, and tremors. PMH: T2DM, CKD< SLE, Afib, HTn, COPD,  GERD  Family/Caregiver Present:  (son on phone to assist with providing PLOF)  Co-Treatment: OT  Co-Treatment Reason: to facilitate pt safety and functional mobility  Prior to Session Communication: Bedside nurse  Patient Position Received: Bed, 3 rail up, Alarm on (in wrist restriants)  Preferred Learning Style: verbal  General Comment: Pt agreeable to PT, nursing cleared for treatment.    Home Living:  Home Living  Type of Home: House  Lives With:  (son)  Home Adaptive Equipment: Walker rolling or standard, Wheelchair-manual (rollator)  Home Layout: Multi-level, Stairs to alternate level with rails, 1/2 bath on main level (BM shower and shower upstairs)  Alternate Level Stairs-Number of Steps: 15 to upstairs and 12 down to BM  Home Access: Stairs to enter without rails  Entrance Stairs-Number of Steps: 3  Bathroom Shower/Tub: Walk-in shower  Bathroom Equipment: Shower chair with back (toilet seat riser)  Home Living Comments: Currently son is home with pt 24/7. Son reports in 2024 he will be working outside of the home occasionally    Prior Level of Function:  Prior Function Per Pt/Caregiver Report  ADL Assistance: Independent (only sponge bathing)  Homemaking Assistance: Needs assistance  Ambulatory Assistance:  (mod I with FWW short distances)  Prior Function Comments: son denies any additional falls in the past 6 months    Precautions:  Precautions  Medical Precautions: Fall precautions (telemetry)    Vital Signs:     Objective     Pain:  Pain Assessment  Pain Assessment: 0-10  Pain Score:  (reports pain in nose but does not rate)    Cognition:  Cognition  Overall Cognitive Status: Impaired (pt having hallucinations, thinks there is an explosion)  Orientation Level: Disoriented to place, Disoriented to situation  Problem Solving:  (impaired)  Processing Speed: Delayed    General Assessments:      Activity Tolerance  Endurance: Decreased tolerance for upright activites                 Static Sitting  Balance  Static Sitting-Comment/Number of Minutes: fair  Dynamic Sitting Balance  Dynamic Sitting-Comments: fair  Static Standing Balance  Static Standing-Comment/Number of Minutes: poor  Dynamic Standing Balance  Dynamic Standing-Comments: poor    Functional Assessments:     Bed Mobility  Bed Mobility: Yes  Bed Mobility 1  Bed Mobility 1: Supine to sitting  Level of Assistance 1: Moderate assistance (x 2)  Bed Mobility 2  Bed Mobility  2: Sitting to supine  Level of Assistance 2: Maximum assistance (x 2)  Transfers  Transfer: Yes  Transfer 1  Transfer From 1: Sit to  Transfer to 1: Stand  Transfer Device 1: Walker  Transfer Level of Assistance 1: Moderate assistance (x 2)  Trials/Comments 1: retro lean, pt bracing legs on bed  Transfers 2  Transfer From 2: Stand to  Transfer to 2: Sit  Transfer Device 2: Walker  Transfer Level of Assistance 2: Moderate assistance (x 2)  Trials/Comments 2: Patient's right nostral began to bleed after sitting down on bed; RN came to bedside to assist. Bleeding was controlled  Ambulation/Gait Training  Ambulation/Gait Training Performed: Yes  Ambulation/Gait Training 1  Device 1: Rolling walker  Gait Support Devices: Gait belt  Assistance 1: Moderate assistance (x 2)  Quality of Gait 1: Knee(s) buckle, Soft knee(s), Decreased step length (Pt unsteady, knees blocked for safety)  Comments/Distance (ft) 1: 4 side steps along EOB        Treatment    Verbal and tactile cues to facilitate abduction/adduction of BLE's and increased use of bed rail to transition supine<>sitting EOB. Cues for placing feet flat on floor at EOB and squaring hips to maximize safety. Cues for safe hand placement with use of FWW for sit<>stand. Instruction in sequencing with FWW for side stepping along EOB.     Extremity/Trunk Assessments:        RLE   RLE : Exceptions to WFL  AROM RLE (degrees)  RLE AROM Comment: WFL  Strength RLE  RLE Overall Strength:  (grossly 4/5 throughout)  LLE   LLE : Exceptions to  WFL  AROM LLE (degrees)  LLE AROM Comment: WFL  Strength LLE  LLE Overall Strength:  (grossly 4/5 throughout)    Outcome Measures:  [facility] Basic Mobility  Turning from your back to your side while in a flat bed without using bedrails: A lot  Moving from lying on your back to sitting on the side of a flat bed without using bedrails: A lot  Moving to and from bed to chair (including a wheelchair): A lot  Standing up from a chair using your arms (e.g. wheelchair or bedside chair): A lot  To walk in hospital room: A lot  Climbing 3-5 steps with railing: Total  Basic Mobility - Total Score: 11                            Goals:  Encounter Problems       Encounter Problems (Active)       PT Problem       Pt will perform bed mobility with min A.         Start:  12/19/23    Expected End:  01/02/24            Pt will complete sit <> stand and bed <> chair transfers with min A.         Start:  12/19/23    Expected End:  01/02/24            Pt will ambulate 30 feet mod A using FWW with no LOB.       Start:  12/19/23    Expected End:  01/02/24            Pt will progress to completing 3 x 20 supine/seated exercises in order to increase strength and improve gait mechanics.         Start:  12/19/23    Expected End:  01/02/24                 Education Documentation  Home Exercise Program, taught by Brittani Dale PT at 12/19/2023  2:50 PM.  Learner: Patient  Readiness: Acceptance  Method: Explanation  Response: Needs Reinforcement    Precautions, taught by Brittani Dale PT at 12/19/2023  2:50 PM.  Learner: Patient  Readiness: Acceptance  Method: Explanation  Response: Needs Reinforcement    Body Mechanics, taught by Brittani Dale PT at 12/19/2023  2:50 PM.  Learner: Patient  Readiness: Acceptance  Method: Explanation  Response: Needs Reinforcement    Mobility Training, taught by Brittani Dale PT at 12/19/2023  2:50 PM.  Learner: Patient  Readiness: Acceptance  Method: Explanation  Response: Needs  Reinforcement    Education Comments  No comments found.

## 2023-12-19 NOTE — CONSULTS
Reason For Consult  Profuse right-sided epistaxis  History Of Present Illness  Bing Holliday is a 63 y.o. female who is currently in the hospital with a history of hyperkalemia and recent onset of new tremors when she developed severe bleeding from her right nostril.  The patient has a history of confusion and it is unclear exactly what started the bleeding from her right nostril.  She is on Eliquis twice daily.  There has been no other trauma to the nose or any recent falls.  She may have accidentally scratched her nose with her fingernail.  No history of previous epistaxis.  ENT was consulted to help manage her persistent epistaxis.     Past Medical History  She has a past medical history of Acute upper respiratory infection, unspecified (03/04/2020), Acute upper respiratory infection, unspecified (09/30/2015), Arthritis, Body mass index (BMI) 23.0-23.9, adult (10/15/2021), Body mass index (BMI) 33.0-33.9, adult (03/04/2020), Cardiomegaly (08/27/2013), Chronic kidney disease, stage 3 unspecified (CMS/HCC) (07/02/2013), Disease of pericardium, unspecified (07/02/2013), Encounter for follow-up examination after completed treatment for conditions other than malignant neoplasm (10/06/2022), Generalized contraction of visual field, right eye (01/29/2015), Homonymous bilateral field defects, right side (04/29/2016), Hypertensive chronic kidney disease with stage 1 through stage 4 chronic kidney disease, or unspecified chronic kidney disease (07/02/2013), Laceration without foreign body, left foot, initial encounter (07/03/2018), Migraine with aura, not intractable, without status migrainosus (10/24/2022), Other conditions influencing health status (07/02/2013), Other conditions influencing health status (07/02/2013), Other conditions influencing health status (07/02/2013), Other conditions influencing health status (05/22/2015), Other conditions influencing health status (10/24/2022), Other conditions influencing  health status (03/14/2022), Other long term (current) drug therapy (10/24/2022), Personal history of diseases of the blood and blood-forming organs and certain disorders involving the immune mechanism (07/02/2013), Personal history of diseases of the skin and subcutaneous tissue (08/11/2015), Personal history of nephrotic syndrome (07/02/2013), Personal history of other diseases of the circulatory system (08/27/2013), Personal history of other diseases of the nervous system and sense organs, Personal history of other diseases of the respiratory system, Personal history of other infectious and parasitic diseases (07/02/2013), Personal history of other specified conditions, Personal history of other specified conditions (08/27/2013), Puckering of macula, right eye (10/24/2022), Raynaud's syndrome without gangrene (07/02/2013), Systemic lupus erythematosus, unspecified (CMS/[facility]) (07/24/2015), Systemic lupus erythematosus, unspecified (CMS/[facility]) (07/24/2015), Systemic lupus erythematosus, unspecified (CMS/[facility]) (07/24/2015), Type 2 diabetes mellitus with diabetic nephropathy (CMS/[facility]) (07/02/2013), Type 2 diabetes mellitus with mild nonproliferative diabetic retinopathy without macular edema, left eye (CMS/[facility]) (07/27/2015), Type 2 diabetes mellitus with mild nonproliferative diabetic retinopathy without macular edema, unspecified eye (CMS/[facility]) (07/24/2015), Type 2 diabetes mellitus with proliferative diabetic retinopathy without macular edema, right eye (CMS/[facility]) (07/27/2015), Type 2 diabetes mellitus with proliferative diabetic retinopathy without macular edema, unspecified eye (CMS/[facility]) (07/24/2015), Unspecified acute and subacute iridocyclitis (07/24/2015), and Unspecified open wound, left foot, sequela (07/03/2018).    Surgical History  She has a past surgical history that includes Eye surgery (03/06/2015); Total hip arthroplasty (01/29/2015); Cholecystectomy (01/29/2015); Other surgical history (01/29/2015); Other  surgical history (01/29/2015); Ankle surgery (01/29/2015); Foot surgery (01/29/2015); MR angio head wo IV contrast (7/26/2013); MR angio neck wo IV contrast (7/26/2013); CT guided percutaneous biopsy bone deep (5/4/2021); MR angio head wo IV contrast (9/17/2021); MR angio neck wo IV contrast (9/17/2021); MR angio neck wo IV contrast (3/25/2023); and MR angio head wo IV contrast (3/25/2023).     Social History  She reports that she has never smoked. She has never used smokeless tobacco. She reports that she does not drink alcohol and does not use drugs.    Family History  Family History   Problem Relation Name Age of Onset    Other (RENAL DISEASE) Mother          END STAGE    Other (CARDIAC DISORDER) Mother      Cataracts Mother      Stroke Mother      Diabetes Mother      Kidney disease Mother      Lupus Mother      Other (CARDIAC DISORDER) Father      COPD Father      Glaucoma Father      Hypertension Father      Sleep apnea Father      Other (CARDIAC DISORDER) Sister      Depression Sister      Kidney disease Sister      Sickle cell trait Sister      Sleep apnea Daughter          Allergies  Ace inhibitors, Hydroxychloroquine, Lisinopril, Penicillins, and Sulfa (sulfonamide antibiotics)    Review of Systems  A detailed 12 system review of systems is noted on the intake form has been reviewed with the patient with details noted in the HPI and scanned into the patient's medical record     Physical Exam  General: Patient is alert and cooperative however somewhat confused.  Head: Normocephalic, atraumatic.  Eyes: PERRL, EOMI, Conjunctiva is clear. No nystagmus.  Ears: Right Ear-- Pinna is normal.  External auditory canal is patent, no lesions. Tympanic membrane is [intact, translucent and has good mobility with my pneumatic otoscope. No effusion].  Mastoid is nontender.            Left ear-- Pinna is normal.  External auditory canal is patent, no lesions.  Tympanic membrane is [intact, translucent and has good mobility  "with my pneumatic otoscope.  No effusion].  Mastoid is nontender.  Nose: The patient has a Merocel sponge in the right nostril and a portion of it is sticking out of the nostril with a string taped to her cheek.  No evidence of any active bleeding.  Septum is relatively straight.  Her left nostril is clear.  Left caudal septum is clear showing no signs of any prominent blood vessels to cauterize at the present time.  Throat:  Floor of mouth is clear, no masses.  Tongue appears normal, no lesions or masses. Gums, gingiva, buccal mucosa appear pink and moist, no lesions. Teeth are in fair repair.  No obvious dental infections.  Peritonsillar regions appear symmetric without swelling.  Hard and soft palate appear normal, no obvious cleft. Uvula is midline.  Oropharynx: No lesions. Retropharyngeal wall is flat.  No active postnasal drip.  No visible posterior clots  Neck: Supple,  no lymphadenopathy.  No masses.  Salivary Glands: Symmetric bilaterally.  No palpable masses.  No evidence of acute infection or salivary stones       Last Recorded Vitals  Blood pressure 177/84, pulse 72, temperature 35.7 °C (96.3 °F), temperature source Temporal, resp. rate 16, height 1.702 m (5' 7\"), weight 93.4 kg (205 lb 14.6 oz), SpO2 93 %.    Relevant Results           Assessment/Plan     Profuse epistaxis right nostril-currently stable after a 10 cm nasal Merocel sponge has been placed in right nostril.  Previously anticoagulated with Eliquis-currently held  Confusion  HTN-- maintain tight control    Recommendation:  We will need to leave the nasal packing in place for the next 7 days.  I will see her in my office in 7 days to remove her nasal packing.  She is currently on antibiotics and if she is discharged home, she will need to antibiotics for a total of 7 days.  I was able to trim the string attached to the Merosel so that she cannot pull the packing out of her nose.  If she has minor oozing she can use Afrin nasal spray to help " soak the sponge in her right nostril.  She can certainly use saline mist in the left nostril to help keep her nose moist.  If possible, it would be advantageous to hold her Eliquis for the next 5 to 7 days.  Once the patient is discharged, please have her call the office to schedule her appointment on 12/26/23 to have her nasal packing removed.  Office direct line-- [phone]    Thanks for allowing to assist in the care of your patient.  Regards,  Evaristo Rolle DO, [facility]  C) [phone]    I spent 60 minutes in the professional and overall care of this patient.      Evaristo Rolle DO

## 2023-12-19 NOTE — PROGRESS NOTES
Sending subcutaneous benlysta rx to [facility] for PA due to drug manu shortage of IV benlysta and hospital transition protocol of adult pts to subcutaneous.

## 2023-12-19 NOTE — SIGNIFICANT EVENT
Epistaxis R nare    Was paged by nurse regarding nasal bleed.  Patient was seen and examined at bedside.    She was profusely bleeding from her right nare -including fresh blood and clots.  The bed linens were soiled with blood as well and patient was very uncomfortable.  Bleeding did not stop with external compression.  No oozing blood noted from the left nare.      Interventions:  Right nasal packing was done using Merisel nasal pack along with infiltration of oxymetazoline, TXA, lidocaine epinephrine solution done after nasal suctioning.  Around 10 clots were taken out through nasal suctioning and nose blowing.    -However shortly after patient pulled off her right nasal packing within 30 seconds.    -Physical restraints including soft restraints and mittens were applied on bilateral upper extremities.  -The above nasal packing intervention was repeated.    -Patient was still trying to pull the nasal packing out.  One-time Ativan 0.5 mg IM was given.  Her IV line during the whole process had blown out.  -ENT, Dr. Rolle was consulted STAT and I spoke with him over the phone.  -Current recommendations to continue these measures.  -Also apply ice packs as needed.  -Keep patient position upright  -Continue monitoring telemetry and H&H and Dr. Rolle will see the patient.  An additional set of epistaxis management tray along with the aforementioned medications have been ordered and kept at bedside.  -Eliquis has been withheld    Upon re examination after 20 minutes, there was no oozing of blood. Pt seemed more calm but still trying to remove the packing. Will keep monitoring.

## 2023-12-19 NOTE — CARE PLAN
Problem: Pain  Goal: My pain/discomfort is manageable  Outcome: Progressing     Problem: Safety  Goal: Patient will be injury free during hospitalization  Outcome: Progressing  Goal: I will remain free of falls  Outcome: Progressing     Problem: Daily Care  Goal: Daily care needs are met  Outcome: Progressing     Problem: Psychosocial Needs  Goal: Demonstrates ability to cope with hospitalization/illness  Outcome: Progressing  Goal: Collaborate with me, my family, and caregiver to identify my specific goals  Outcome: Progressing     Problem: Skin  Goal: Decreased wound size/increased tissue granulation at next dressing change  Outcome: Progressing  Goal: Participates in plan/prevention/treatment measures  Outcome: Progressing  Goal: Prevent/manage excess moisture  Outcome: Progressing  Flowsheets (Taken 12/19/2023 1139)  Prevent/manage excess moisture:   Moisturize dry skin   Cleanse incontinence/protect with barrier cream  Goal: Prevent/minimize sheer/friction injuries  Outcome: Progressing  Goal: Promote/optimize nutrition  Outcome: Progressing  Goal: Promote skin healing  Outcome: Progressing     Problem: Pain - Adult  Goal: Verbalizes/displays adequate comfort level or baseline comfort level  Outcome: Progressing     Problem: Diabetes  Goal: Achieve decreasing blood glucose levels by end of shift  Outcome: Progressing  Goal: Increase stability of blood glucose readings by end of shift  Outcome: Progressing  Goal: Decrease in ketones present in urine by end of shift  Outcome: Progressing  Goal: Maintain electrolyte levels within acceptable range throughout shift  Outcome: Progressing  Goal: Maintain glucose levels >70mg/dl to <250mg/dl throughout shift  Outcome: Progressing  Goal: No changes in neurological exam by end of shift  Outcome: Progressing  Goal: Learn about and adhere to nutrition recommendations by end of shift  Outcome: Progressing  Goal: Vital signs within normal range for age by end of  shift  Outcome: Progressing  Goal: Increase self care and/or family involovement by end of shift  Outcome: Progressing  Goal: Receive DSME education by end of shift  Outcome: Progressing   The patient's goals for the shift include      The clinical goals for the shift include Patient will remain free of all pain and discomforts until the end of the shift

## 2023-12-20 LAB
ALBUMIN SERPL BCP-MCNC: 3 G/DL (ref 3.4–5)
ANION GAP SERPL CALC-SCNC: 14 MMOL/L (ref 10–20)
BASOPHILS # BLD AUTO: 0.02 X10*3/UL (ref 0–0.1)
BASOPHILS NFR BLD AUTO: 0.4 %
BUN SERPL-MCNC: 31 MG/DL (ref 6–23)
CALCIUM SERPL-MCNC: 8.5 MG/DL (ref 8.6–10.3)
CHLORIDE SERPL-SCNC: 110 MMOL/L (ref 98–107)
CO2 SERPL-SCNC: 25 MMOL/L (ref 21–32)
CREAT SERPL-MCNC: 2.22 MG/DL (ref 0.5–1.05)
EOSINOPHIL # BLD AUTO: 0.02 X10*3/UL (ref 0–0.7)
EOSINOPHIL NFR BLD AUTO: 0.4 %
ERYTHROCYTE [DISTWIDTH] IN BLOOD BY AUTOMATED COUNT: 21.2 % (ref 11.5–14.5)
GFR SERPL CREATININE-BSD FRML MDRD: 25 ML/MIN/1.73M*2
GLUCOSE BLD MANUAL STRIP-MCNC: 155 MG/DL (ref 74–99)
GLUCOSE BLD MANUAL STRIP-MCNC: 230 MG/DL (ref 74–99)
GLUCOSE BLD MANUAL STRIP-MCNC: 78 MG/DL (ref 74–99)
GLUCOSE BLD MANUAL STRIP-MCNC: 85 MG/DL (ref 74–99)
GLUCOSE SERPL-MCNC: 71 MG/DL (ref 74–99)
HCT VFR BLD AUTO: 28.7 % (ref 36–46)
HGB BLD-MCNC: 8.5 G/DL (ref 12–16)
IMM GRANULOCYTES # BLD AUTO: 0.2 X10*3/UL (ref 0–0.7)
IMM GRANULOCYTES NFR BLD AUTO: 3.6 % (ref 0–0.9)
LYMPHOCYTES # BLD AUTO: 1.48 X10*3/UL (ref 1.2–4.8)
LYMPHOCYTES NFR BLD AUTO: 26.6 %
MAGNESIUM SERPL-MCNC: 1.92 MG/DL (ref 1.6–2.4)
MCH RBC QN AUTO: 30 PG (ref 26–34)
MCHC RBC AUTO-ENTMCNC: 29.6 G/DL (ref 32–36)
MCV RBC AUTO: 101 FL (ref 80–100)
MONOCYTES # BLD AUTO: 0.46 X10*3/UL (ref 0.1–1)
MONOCYTES NFR BLD AUTO: 8.3 %
NEUTROPHILS # BLD AUTO: 3.38 X10*3/UL (ref 1.2–7.7)
NEUTROPHILS NFR BLD AUTO: 60.7 %
NRBC BLD-RTO: 1.1 /100 WBCS (ref 0–0)
PHOSPHATE SERPL-MCNC: 3.1 MG/DL (ref 2.5–4.9)
PLATELET # BLD AUTO: 85 X10*3/UL (ref 150–450)
POTASSIUM SERPL-SCNC: 4.8 MMOL/L (ref 3.5–5.3)
RBC # BLD AUTO: 2.83 X10*6/UL (ref 4–5.2)
SODIUM SERPL-SCNC: 144 MMOL/L (ref 136–145)
WBC # BLD AUTO: 5.6 X10*3/UL (ref 4.4–11.3)

## 2023-12-20 PROCEDURE — 1200000002 HC GENERAL ROOM WITH TELEMETRY DAILY

## 2023-12-20 PROCEDURE — 94760 N-INVAS EAR/PLS OXIMETRY 1: CPT

## 2023-12-20 PROCEDURE — 2500000001 HC RX 250 WO HCPCS SELF ADMINISTERED DRUGS (ALT 637 FOR MEDICARE OP)

## 2023-12-20 PROCEDURE — 2500000004 HC RX 250 GENERAL PHARMACY W/ HCPCS (ALT 636 FOR OP/ED)

## 2023-12-20 PROCEDURE — 85025 COMPLETE CBC W/AUTO DIFF WBC: CPT

## 2023-12-20 PROCEDURE — 80069 RENAL FUNCTION PANEL: CPT

## 2023-12-20 PROCEDURE — 99233 SBSQ HOSP IP/OBS HIGH 50: CPT

## 2023-12-20 PROCEDURE — 36415 COLL VENOUS BLD VENIPUNCTURE: CPT

## 2023-12-20 PROCEDURE — 97535 SELF CARE MNGMENT TRAINING: CPT | Mod: GO,CO

## 2023-12-20 PROCEDURE — 97530 THERAPEUTIC ACTIVITIES: CPT | Mod: GP,CQ

## 2023-12-20 PROCEDURE — 83735 ASSAY OF MAGNESIUM: CPT

## 2023-12-20 PROCEDURE — 82947 ASSAY GLUCOSE BLOOD QUANT: CPT

## 2023-12-20 RX ORDER — SODIUM CHLORIDE 450 MG/100ML
100 INJECTION, SOLUTION INTRAVENOUS CONTINUOUS
Status: DISCONTINUED | OUTPATIENT
Start: 2023-12-20 | End: 2023-12-21

## 2023-12-20 RX ORDER — PREDNISONE 20 MG/1
20 TABLET ORAL EVERY MORNING
Status: DISCONTINUED | OUTPATIENT
Start: 2023-12-21 | End: 2023-12-21

## 2023-12-20 RX ORDER — PREDNISONE 10 MG/1
10 TABLET ORAL SEE ADMIN INSTRUCTIONS
Status: DISCONTINUED | OUTPATIENT
Start: 2023-12-20 | End: 2023-12-21

## 2023-12-20 RX ADMIN — SODIUM CHLORIDE 100 ML/HR: 4.5 INJECTION, SOLUTION INTRAVENOUS at 16:23

## 2023-12-20 RX ADMIN — Medication 5 MG: at 20:33

## 2023-12-20 RX ADMIN — RISPERIDONE 0.5 MG: 0.5 TABLET ORAL at 20:33

## 2023-12-20 RX ADMIN — PANTOPRAZOLE SODIUM 40 MG: 40 TABLET, DELAYED RELEASE ORAL at 09:37

## 2023-12-20 RX ADMIN — ATORVASTATIN CALCIUM 40 MG: 40 TABLET, FILM COATED ORAL at 09:37

## 2023-12-20 RX ADMIN — Medication 400 MG: at 09:38

## 2023-12-20 RX ADMIN — MYCOPHENOLATE MOFETIL 1000 MG: 250 CAPSULE ORAL at 09:38

## 2023-12-20 RX ADMIN — CEFTRIAXONE SODIUM 1 G: 1 INJECTION, SOLUTION INTRAVENOUS at 09:39

## 2023-12-20 RX ADMIN — PREDNISONE 10 MG: 5 TABLET ORAL at 09:37

## 2023-12-20 RX ADMIN — MYCOPHENOLATE MOFETIL 1000 MG: 250 CAPSULE ORAL at 20:33

## 2023-12-20 RX ADMIN — AMLODIPINE BESYLATE 2.5 MG: 2.5 TABLET ORAL at 09:37

## 2023-12-20 RX ADMIN — POLYETHYLENE GLYCOL 3350 17 G: 17 POWDER, FOR SOLUTION ORAL at 09:37

## 2023-12-20 ASSESSMENT — COGNITIVE AND FUNCTIONAL STATUS - GENERAL
STANDING UP FROM CHAIR USING ARMS: A LOT
TURNING FROM BACK TO SIDE WHILE IN FLAT BAD: A LOT
TOILETING: A LOT
MOVING TO AND FROM BED TO CHAIR: A LOT
DRESSING REGULAR UPPER BODY CLOTHING: A LITTLE
MOVING TO AND FROM BED TO CHAIR: A LOT
STANDING UP FROM CHAIR USING ARMS: A LOT
HELP NEEDED FOR BATHING: A LOT
CLIMB 3 TO 5 STEPS WITH RAILING: TOTAL
WALKING IN HOSPITAL ROOM: A LOT
TURNING FROM BACK TO SIDE WHILE IN FLAT BAD: A LOT
MOBILITY SCORE: 11
CLIMB 3 TO 5 STEPS WITH RAILING: TOTAL
MOBILITY SCORE: 11
DRESSING REGULAR UPPER BODY CLOTHING: A LITTLE
DRESSING REGULAR LOWER BODY CLOTHING: A LOT
WALKING IN HOSPITAL ROOM: A LOT
EATING MEALS: A LITTLE
DAILY ACTIVITIY SCORE: 15
HELP NEEDED FOR BATHING: A LOT
TOILETING: A LOT
MOVING FROM LYING ON BACK TO SITTING ON SIDE OF FLAT BED WITH BEDRAILS: A LOT
PERSONAL GROOMING: A LITTLE
MOVING FROM LYING ON BACK TO SITTING ON SIDE OF FLAT BED WITH BEDRAILS: A LOT
EATING MEALS: A LITTLE
DAILY ACTIVITIY SCORE: 15
DRESSING REGULAR LOWER BODY CLOTHING: A LOT
PERSONAL GROOMING: A LITTLE

## 2023-12-20 ASSESSMENT — PAIN SCALES - GENERAL
PAINLEVEL_OUTOF10: 0 - NO PAIN

## 2023-12-20 ASSESSMENT — ACTIVITIES OF DAILY LIVING (ADL)
HOME_MANAGEMENT_TIME_ENTRY: 20
EFFECT OF PAIN ON DAILY ACTIVITIES: .
LACK_OF_TRANSPORTATION: NO

## 2023-12-20 ASSESSMENT — PAIN - FUNCTIONAL ASSESSMENT
PAIN_FUNCTIONAL_ASSESSMENT: 0-10

## 2023-12-20 NOTE — PROGRESS NOTES
Bing Holliday is a 63 y.o. female on day 1 of admission presenting with Acute hyperkalemia.      Subjective   NAEON. Continues to improve mentally, but not completely aware of her medical condition.       Objective     Last Recorded Vitals  /70 (BP Location: Right arm, Patient Position: Lying)   Pulse 62   Temp 36 °C (96.8 °F) (Temporal)   Resp 16   Wt 93.4 kg (205 lb 14.6 oz)   SpO2 96%   Intake/Output last 3 Shifts:    Intake/Output Summary (Last 24 hours) at 12/20/2023 1518  Last data filed at 12/20/2023 1400  Gross per 24 hour   Intake 1250 ml   Output --   Net 1250 ml         Admission Weight  Weight: 105 kg (231 lb 7.7 oz) (12/16/23 3648)    Daily Weight  12/18/23 : 93.4 kg (205 lb 14.6 oz)    Image Results  ECG 12 lead  Marked sinus bradycardia  Moderate voltage criteria for LVH, may be normal variant  Nonspecific T wave abnormality  Borderline ECG  When compared with ECG of 20-NOV-2023 15:44,  No significant change was found  Confirmed by Cheko Mike ([id number]) on 12/18/2023 9:46:20 PM      Physical Exam  Vitals reviewed.   Constitutional:       General: She is not in acute distress.     Appearance: Normal appearance. She is not ill-appearing, toxic-appearing or diaphoretic.   HENT:      Head: Normocephalic and atraumatic.      Right Ear: External ear normal.      Left Ear: External ear normal.      Nose:      Comments: There is nasal packing present within the R nare.      Mouth/Throat:      Mouth: Mucous membranes are moist.      Pharynx: Oropharynx is clear.   Eyes:      Extraocular Movements: Extraocular movements intact.      Pupils: Pupils are equal, round, and reactive to light.   Cardiovascular:      Rate and Rhythm: Normal rate and regular rhythm.      Pulses: Normal pulses.      Heart sounds: Normal heart sounds. No murmur heard.     No friction rub. No gallop.   Pulmonary:      Effort: Pulmonary effort is normal. No respiratory distress.      Breath sounds: Normal breath sounds. No  wheezing, rhonchi or rales.   Abdominal:      General: Abdomen is flat. Bowel sounds are normal. There is no distension.      Palpations: Abdomen is soft. There is no mass.      Tenderness: There is no abdominal tenderness.      Hernia: No hernia is present.   Musculoskeletal:         General: Normal range of motion.      Cervical back: Normal range of motion and neck supple.   Skin:     General: Skin is warm and dry.      Capillary Refill: Capillary refill takes less than 2 seconds.   Neurological:      General: No focal deficit present.      Mental Status: She is oriented to person, place, and time.      Comments: A/Ox2 to person and place   Psychiatric:         Mood and Affect: Mood normal.         Behavior: Behavior normal.         Relevant Results    Scheduled medications  amLODIPine, 2.5 mg, oral, Daily  [Held by provider] apixaban, 2.5 mg, oral, BID  atorvastatin, 40 mg, oral, Daily  budesonide, 0.5 mg, nebulization, BID  cefTRIAXone, 1 g, intravenous, Daily  formoterol, 20 mcg, nebulization, BID  magnesium oxide, 400 mg, oral, Daily  melatonin, 5 mg, oral, Nightly  mycophenolate, 1,000 mg, oral, BID  pantoprazole, 40 mg, oral, Daily  polyethylene glycol, 17 g, oral, Daily  predniSONE, 10 mg, oral, See admin instructions  [START ON 12/21/2023] predniSONE, 20 mg, oral, q AM  risperiDONE, 0.5 mg, oral, Nightly  [Held by provider] torsemide, 10 mg, oral, Once per day on Mon Wed Fri      Continuous medications       PRN medications  PRN medications: acetaminophen, dextrose 10 % in water (D10W), dextrose, glucagon, ipratropium-albuteroL  Results for orders placed or performed during the hospital encounter of 12/16/23 (from the past 24 hour(s))   POCT GLUCOSE   Result Value Ref Range    POCT Glucose 184 (H) 74 - 99 mg/dL   POCT GLUCOSE   Result Value Ref Range    POCT Glucose 193 (H) 74 - 99 mg/dL   Magnesium   Result Value Ref Range    Magnesium 1.92 1.60 - 2.40 mg/dL   CBC and Auto Differential   Result Value Ref  Range    WBC 5.6 4.4 - 11.3 x10*3/uL    nRBC 1.1 (H) 0.0 - 0.0 /100 WBCs    RBC 2.83 (L) 4.00 - 5.20 x10*6/uL    Hemoglobin 8.5 (L) 12.0 - 16.0 g/dL    Hematocrit 28.7 (L) 36.0 - 46.0 %     (H) 80 - 100 fL    MCH 30.0 26.0 - 34.0 pg    MCHC 29.6 (L) 32.0 - 36.0 g/dL    RDW 21.2 (H) 11.5 - 14.5 %    Platelets 85 (L) 150 - 450 x10*3/uL    Neutrophils % 60.7 40.0 - 80.0 %    Immature Granulocytes %, Automated 3.6 (H) 0.0 - 0.9 %    Lymphocytes % 26.6 13.0 - 44.0 %    Monocytes % 8.3 2.0 - 10.0 %    Eosinophils % 0.4 0.0 - 6.0 %    Basophils % 0.4 0.0 - 2.0 %    Neutrophils Absolute 3.38 1.20 - 7.70 x10*3/uL    Immature Granulocytes Absolute, Automated 0.20 0.00 - 0.70 x10*3/uL    Lymphocytes Absolute 1.48 1.20 - 4.80 x10*3/uL    Monocytes Absolute 0.46 0.10 - 1.00 x10*3/uL    Eosinophils Absolute 0.02 0.00 - 0.70 x10*3/uL    Basophils Absolute 0.02 0.00 - 0.10 x10*3/uL   Renal Function Panel   Result Value Ref Range    Glucose 71 (L) 74 - 99 mg/dL    Sodium 144 136 - 145 mmol/L    Potassium 4.8 3.5 - 5.3 mmol/L    Chloride 110 (H) 98 - 107 mmol/L    Bicarbonate 25 21 - 32 mmol/L    Anion Gap 14 10 - 20 mmol/L    Urea Nitrogen 31 (H) 6 - 23 mg/dL    Creatinine 2.22 (H) 0.50 - 1.05 mg/dL    eGFR 25 (L) >60 mL/min/1.73m*2    Calcium 8.5 (L) 8.6 - 10.3 mg/dL    Phosphorus 3.1 2.5 - 4.9 mg/dL    Albumin 3.0 (L) 3.4 - 5.0 g/dL   POCT GLUCOSE   Result Value Ref Range    POCT Glucose 85 74 - 99 mg/dL   POCT GLUCOSE   Result Value Ref Range    POCT Glucose 78 74 - 99 mg/dL     ECG 12 lead    Result Date: 12/18/2023  Marked sinus bradycardia Moderate voltage criteria for LVH, may be normal variant Nonspecific T wave abnormality Abnormal ECG When compared with ECG of 20-NOV-2023 15:44, No significant change was found    CT head wo IV contrast    Result Date: 12/17/2023  Interpreted By:  Tr Colunga, STUDY: CT HEAD WO IV CONTRAST;  12/17/2023 1:12 am   INDICATION: Signs/Symptoms:confusion, weakness.   COMPARISON: Head CT  dated 10/19/2022.   ACCESSION NUMBER(S): [id number]   ORDERING CLINICIAN: DHARA CHAO   TECHNIQUE: Axial noncontrast CT images of the head.   FINDINGS: Gray-white matter differentiation is maintained. There are no extra-axial collections. No mass effect or midline shift. There is no hydrocephalus. There is mild cerebral involutional change. There is a chronic left occipital infarct. There are chronic bilateral basal ganglia lacunar infarcts and scattered periventricular and deep white matter hypodensities suggestive of mild chronic microvascular ischemic change.   HEMORRHAGE: No acute intracranial hemorrhage.   CALVARIUM: No depressed skull fracture.   EXTRACRANIAL SOFT TISSUES:.. Unremarkable.   PARANASAL SINUSES/MASTOIDS: The visualized paranasal sinuses are well aerated. Mastoid air cells are clear.   ORBITS: Grossly normal.       Unchanged chronic left occipital infarct as well as mild chronic small-vessel ischemic changes.     Signed by: Tr Colunga 12/17/2023 1:32 AM Dictation workstation:   [id number]    XR foot left 1-2 views    Result Date: 12/17/2023  Interpreted By:  Tr Colunga, STUDY: XR FOOT LEFT 1-2 VIEWS; ;  12/17/2023 1:07 am   INDICATION: Signs/Symptoms:diabetic foot ulcer.   COMPARISON: Left foot radiographs dated 07/12/2018.   ACCESSION NUMBER(S): [id number]   ORDERING CLINICIAN: DHARA CHAO   FINDINGS: No evidence of acute fracture or dislocation. Chronic deformity and ghost tracks within the distal tibia and fibula related to previous fracture. There is similar appearance of 1st metatarsophalangeal hallux valgus deformity and 2nd through 5th hammertoe deformities, which somewhat limits evaluation for focal abnormalities. There is extensive mid foot arthrosis with osseous fusion of the mid tarsal bones similar to prior..   There is diffuse edema around the left foot, without clear evidence of abnormal periosteal reaction or erosion.       Extensive degenerative changes in the  left foot with chronic mid tarsal osseous fusion. No radiographic evidence of osseous erosion or abnormal periosteal reaction. Nonspecific diffuse soft tissue swelling, similar to prior.     MACRO: None   Signed by: Tr Colunga 12/17/2023 1:29 AM Dictation workstation:   [id number]    XR chest 1 view    Result Date: 12/17/2023  Interpreted By:  Tr Colunga, STUDY: XR CHEST 1 VIEW;  12/17/2023 1:07 am   INDICATION: Signs/Symptoms:sob.   COMPARISON: Correlation made with CT abdomen dated 11/20/2022.   ACCESSION NUMBER(S): [id number]   ORDERING CLINICIAN: DANIELA MULLEN   FINDINGS:   CARDIOMEDIASTINAL SILHOUETTE: Cardiomediastinal silhouette is mildly enlarged taking into account portable technique. Heavy coronary artery atherosclerotic calcifications better visualized on the CT scan.   LUNGS/PLEURA: There are no consolidations.There are no pleural effusions. There is no demonstrated pneumothorax.     BONES: No evidence of acute osseous abnormality.       1.  No evidence of acute cardiopulmonary process. Mild cardiomegaly and extensive coronary artery atherosclerotic calcifications.     Signed by: Tr Colunga 12/17/2023 1:25 AM Dictation workstation:   [id number]    Electrocardiogram, 12-lead PRN ACS symptoms    Result Date: 11/30/2023  Normal sinus rhythm Possible Left atrial enlargement Left ventricular hypertrophy Nonspecific T wave abnormality Abnormal ECG When compared with ECG of 20-NOV-2023 13:20, (unconfirmed) Nonspecific T wave abnormality has replaced inverted T waves in Lateral leads Confirmed by Caterina Dixon ([id number]) on 11/30/2023 10:58:34 PM    ECG 12 lead    Result Date: 11/30/2023  Normal sinus rhythm Possible Left atrial enlargement Left ventricular hypertrophy Cannot rule out Septal infarct , age undetermined ST & T wave abnormality, consider lateral ischemia Abnormal ECG When compared with ECG of 19-OCT-2023 16:04, T wave inversion now evident in Lateral leads Confirmed by Zack  Caterina ([id number]) on 11/30/2023 10:55:06 PM    CT abdomen pelvis wo IV contrast    Result Date: 11/20/2023  Interpreted By:  Deacon Munoz, STUDY: CT ABDOMEN PELVIS WO IV CONTRAST;  11/20/2023 10:25 pm   INDICATION: Signs/Symptoms:epigastric/periumbilical abd pain w/ diarrhea.   COMPARISON: 8/28/2023   ACCESSION NUMBER(S): [id number]   ORDERING CLINICIAN: ARELIS VILLAGRAN   TECHNIQUE: Contiguous axial images of the abdomen and pelvis were obtained without intravenous contrast. Coronal and sagittal reformatted images were obtained from the axial images.   FINDINGS: There is limited evaluation of the lung bases. Mild basilar subsegmental atelectasis.   Cardiomegaly and coronary artery atherosclerotic calcifications.   Evaluation of the abdominal viscera is limited secondary to lack of intravenous contrast. Limited evaluation for liver mass on noncontrast examination. The gallbladder surgically absent.   The pancreas, spleen, and adrenal glands appear unremarkable.   Evaluation of the kidneys is limited secondary to lack of intravenous contrast. 2 mm nonobstructive right renal calculus. No hydronephrosis. Right renal vascular calcifications.   Atherosclerotic calcification of the abdominal aorta and bilateral iliac arteries.   No evidence of bowel obstruction or acute appendicitis. There is colonic diverticulosis without evidence of acute diverticulitis. There is underdistention versus wall thickening of the ascending colon. The colon is diffusely underdistended and not well evaluated.   Limited evaluation of the urinary bladder is underdistention and beam hardening artifact.   Postsurgical change of right hip arthroplasty resulting in beam hardening artifact and limiting evaluation of the pelvis. There is stable advanced left hip osteoarthrosis with joint space loss and subchondral sclerosis and cystic change and femoral head neck junction osseous spurring.   Multilevel degenerative change of the lumbar spine. There is stable  multilevel degenerative endplate change. Stable mild compression deformity of L1.       No evidence of bowel obstruction or acute appendicitis. Colon is diffusely underdistended and not well evaluated. There is underdistention versus wall thickening of the ascending colon which may be infectious or inflammatory in etiology. Colonic diverticulosis without evidence of acute diverticulitis.   Limited evaluation of the urinary bladder secondary to underdistention and beam hardening artifact. Underdistention versus mild urinary bladder wall thickening; please correlate with urinalysis to exclude cystitis.   MACRO: None   Signed by: Deacon Munoz 11/20/2023 11:26 PM Dictation workstation:   [id number]    XR chest 1 view    Result Date: 11/20/2023  STUDY: Chest Radiograph;  11/20/2023 3:08 PM. INDICATION: Weakness. COMPARISON: XR chest 10/19/2023. ACCESSION NUMBER(S): [id number] ORDERING CLINICIAN: BUSHRA NGUYEN TECHNIQUE:  Frontal chest was obtained at 15:07 hours. FINDINGS: CARDIOMEDIASTINAL SILHOUETTE: Cardiomediastinal silhouette is mildly enlarged in size. The aorta is calcified and tortuous.  LUNGS: Lungs are mildly hypoinflated, but appear clear.  ABDOMEN: No remarkable upper abdominal findings.  BONES: No acute osseous changes.    Stable mild enlargement of the cardiac silhouette. Mildly hypoinflated lungs which appear clear. Calcified and tortuous aorta. 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 Patient also found to have UTI with culture resulting in Serratia >100k.     #UTI - improving  #Acute toxic encephalopathy - improving  - continue rocephin  - 12/18 urine culture likely contaminated with multiple organisms.  - will continue to treat based on prior urine culture with positive serratia  - currently A/Ox3 to person, place, and time. Continues to lack medical capacity, but improving relative to day prior.  - possible adrenal component, will increase steroid dosing and assess tomorrow for improvement in mentation.  - CTM for acute clinical changes    #Epistaxis  - R nare bleed.   - packing in place  - hold eliquis  - can use afrin for breakthrough bleeding per ENT  - ENT on consult, recommend 7 days of abx, and follow up as outpatient in 7 days.    #Acute hyperkalemia- resolved  -S/p Kayexalate oral suspension 15 g, and calcium gluconate in the ED  -Albuterol nebulized solution one-time dose   -Continue to monitor RFP  -Avoid nephrotoxic agents  -EKG without signs of hyperkalemia  -Continue telemetry      #Hypoglycemia in setting of DM2:  -Resolved prior to presentation  -Continue SSI #1  -Continue to monitor blood glucose  -Hypoglycemia protocol in place     #Paroxysmal A-fib:  -Home Eliquis held in setting of epistaxis     SLE  HTN  COPD  GERD  -Continue home medications as appropriate  IVF: .45NS  Diet: Renal + diabetic  DVT prophylaxis: Home Eliquis held 2/2 epistaxis  Dispo: Pending improvement in sodium, potassium and mental status  Consults: ENT     CODE STATUS: DNR/DNI    Discussed with  attending physician,  Mynor Jennings D.O.  PGY-1 Internal medicine resident

## 2023-12-20 NOTE — PROGRESS NOTES
Patient is confused and I am unable to assess her discharge needs with her. Voice mail left for sonLópez at

## 2023-12-20 NOTE — PROGRESS NOTES
Occupational Therapy    OT Treatment    Patient Name: Bing Holliday  MRN: [id number]  Today's Date: 12/20/2023  Time Calculation  Start Time: 1335  Stop Time: 1355  Time Calculation (min): 20 min         Assessment:  Pt is very pleasant, cooperative and receptive to participation. Pt Mod A x2 for transfers, unsteady.  End of Session Communication: Bedside nurse  End of Session Patient Position: Up in chair (set up with lunch tray, verbalized use of call light)       Plan:  OT Frequency: 4 times per week  OT Discharge Recommendations: Moderate intensity level of continued care     Subjective      12/20/23 1335   OT Last Visit   OT Received On 12/20/23   General   Prior to Session Communication Bedside nurse   Patient Position Received Bed, 3 rail up   General Comment Pt agreeable to tx and cleared for participation.   Precautions   Medical Precautions Fall precautions   Pain Assessment   Pain Assessment 0-10   Pain Score 0 - No pain   Cognition   Overall Cognitive Status Impaired  (mildly confused)   Grooming   Grooming Level of Assistance Setup   Grooming Where Assessed Recliner   Grooming Comments Pt completed hand washing task with wipe from recliner level following setup   Toileting   Toileting Level of Assistance Minimum assistance  (x2 to maintain standing balance)   Where Assessed Bedside commode   Toileting Comments Pt completed candido care in stance with setup of wipes, assist to maintain balance and safety.   Bed Mobility 1   Bed Mobility 1 Supine to sitting   Level of Assistance 1 Minimum assistance   Bed Mobility Comments 1 HOB elevated, increased time and effort   Transfer 1   Technique 1 Sit to stand;Stand to sit   Transfer Device 1 Gait belt;Walker   Transfer Level of Assistance 1 Moderate assistance;+2;Minimal verbal cues   Trials/Comments 1 Pt completed STS functional transfers from various surfaces including EOB and BSC with Mod A x2, cues for safety.     Pt ambulates short distances bed>BSC,  BSC>recliner using fww, Mod a x2, cues for pacing and use of Ue's as R knee buckling, pt unsteady. Pt cued for proper stand>sit including lining up with seated surface and remaining within frame of fww.      Static Sitting Balance   Static Sitting-Comment/Number of Minutes good   Dynamic Sitting Balance   Dynamic Sitting-Comments fair+   Static Standing Balance   Static Standing-Comment/Number of Minutes fair-   Dynamic Standing Balance   Dynamic Standing-Comments poor   IP OT Assessment   End of Session Communication Bedside nurse   End of Session Patient Position Up in chair  (set up with lunch tray, verbalized use of call light)   Inpatient Plan   OT Frequency 4 times per week   OT Discharge Recommendations Moderate intensity level of continued care     Outcome Measures:[facility] Daily Activity  Putting on and taking off regular lower body clothing: A lot  Bathing (including washing, rinsing, drying): A lot  Putting on and taking off regular upper body clothing: A little  Toileting, which includes using toilet, bedpan or urinal: A lot  Taking care of personal grooming such as brushing teeth: A little  Eating Meals: A little  Daily Activity - Total Score: 15  Education Documentation  ADL Training, taught by RISSA Rao at 12/20/2023  3:29 PM.  Learner: Patient  Readiness: Acceptance  Method: Explanation  Response: Verbalizes Understanding  Comment: functional transfer safety, pacing and safety during ADLs    Education Comments  No comments found.            Goals:  Encounter Problems       Encounter Problems (Active)       OT Goals       Good dynamic sitting balance for ADL.        Start:  12/19/23    Expected End:  01/02/24            Fair (-) dynamic standing balance for ADL.        Start:  12/19/23    Expected End:  01/02/24            Min assist sit/stand, bed/chair/commode with FWW.        Start:  12/19/23    Expected End:  01/02/24            Min assist toileting,        Start:  12/19/23    Expected End:   01/02/24            Min assist grooming.        Start:  12/19/23    Expected End:  01/02/24

## 2023-12-20 NOTE — PROGRESS NOTES
Patient is confused and unable to participate in her discharge planning. She was at home prior to admission to hospital Patient in the past has been alert and oriented . I spoke with the son López via phone and we will wait to determine the discharge plan until patient's mental status improves. If she needs SNF, son would like her to return to [place] before returning home.

## 2023-12-20 NOTE — CARE PLAN
The patient's goals for the shift include      The clinical goals for the shift include Pt to stay hemodynamically stable all shift    Problem: Pain  Goal: My pain/discomfort is manageable  Outcome: Progressing     Problem: Safety  Goal: Patient will be injury free during hospitalization  Outcome: Progressing  Goal: I will remain free of falls  Outcome: Progressing     Problem: Daily Care  Goal: Daily care needs are met  Outcome: Progressing     Problem: Psychosocial Needs  Goal: Demonstrates ability to cope with hospitalization/illness  Outcome: Progressing  Goal: Collaborate with me, my family, and caregiver to identify my specific goals  Outcome: Progressing     Problem: Skin  Goal: Decreased wound size/increased tissue granulation at next dressing change  Outcome: Progressing  Goal: Participates in plan/prevention/treatment measures  Outcome: Progressing  Goal: Prevent/manage excess moisture  Outcome: Progressing  Goal: Prevent/minimize sheer/friction injuries  12/20/2023 1851 by Irma Condon RN  Outcome: Progressing  12/20/2023 1318 by Irma Condon RN  Flowsheets (Taken 12/20/2023 1318)  Prevent/minimize sheer/friction injuries:   Use pull sheet   Increase activity/out of bed for meals   HOB 30 degrees or less   Turn/reposition every 2 hours/use positioning/transfer devices  Goal: Promote/optimize nutrition  Outcome: Progressing  Goal: Promote skin healing  Outcome: Progressing     Problem: Safety - Adult  Goal: Free from fall injury  Outcome: Progressing     Problem: Pain  Goal: Takes deep breaths with improved pain control throughout the shift  Outcome: Progressing  Goal: Turns in bed with improved pain control throughout the shift  Outcome: Progressing  Goal: Walks with improved pain control throughout the shift  Outcome: Progressing  Goal: Performs ADL's with improved pain control throughout shift  Outcome: Progressing  Goal: Participates in PT with improved pain control throughout the  shift  Outcome: Progressing  Goal: Free from opioid side effects throughout the shift  Outcome: Progressing  Goal: Free from acute confusion related to pain meds throughout the shift  Outcome: Progressing     Problem: Pain - Adult  Goal: Verbalizes/displays adequate comfort level or baseline comfort level  Outcome: Progressing

## 2023-12-20 NOTE — CARE PLAN
The patient's goals for the shift include  rest    The clinical goals for the shift include no epistaxis. Goal met.     Problem: Pain  Goal: My pain/discomfort is manageable  Outcome: Progressing     Problem: Safety  Goal: Patient will be injury free during hospitalization  Outcome: Progressing  Goal: I will remain free of falls  Outcome: Progressing     Problem: Daily Care  Goal: Daily care needs are met  Outcome: Progressing     Problem: Psychosocial Needs  Goal: Demonstrates ability to cope with hospitalization/illness  Outcome: Progressing  Goal: Collaborate with me, my family, and caregiver to identify my specific goals  Outcome: Progressing     Problem: Discharge Barriers  Goal: My discharge needs are met  Outcome: Progressing     Problem: Skin  Goal: Decreased wound size/increased tissue granulation at next dressing change  Outcome: Progressing  Goal: Participates in plan/prevention/treatment measures  Outcome: Progressing  Goal: Prevent/manage excess moisture  Outcome: Progressing  Goal: Prevent/minimize sheer/friction injuries  Outcome: Progressing  Goal: Promote/optimize nutrition  Outcome: Progressing  Goal: Promote skin healing  Outcome: Progressing     Problem: Safety - Adult  Goal: Free from fall injury  Outcome: Progressing     Problem: Discharge Planning  Goal: Discharge to home or other facility with appropriate resources  Outcome: Progressing     Problem: Diabetes  Goal: Achieve decreasing blood glucose levels by end of shift  Outcome: Progressing  Goal: Increase stability of blood glucose readings by end of shift  Outcome: Progressing  Goal: Decrease in ketones present in urine by end of shift  Outcome: Progressing  Goal: Maintain electrolyte levels within acceptable range throughout shift  Outcome: Progressing  Goal: Maintain glucose levels >70mg/dl to <250mg/dl throughout shift  Outcome: Progressing  Goal: No changes in neurological exam by end of shift  Outcome: Progressing  Goal: Learn about  and adhere to nutrition recommendations by end of shift  Outcome: Progressing  Goal: Vital signs within normal range for age by end of shift  Outcome: Progressing  Goal: Increase self care and/or family involovement by end of shift  Outcome: Progressing  Goal: Receive DSME education by end of shift  Outcome: Progressing

## 2023-12-20 NOTE — PROGRESS NOTES
Occupational Therapy    Evaluation    Patient Name: Bing Holliday  MRN: [id number]  Today's Date: 12/19/2023  Time Calculation  Start Time: 1149  Stop Time: 1219  Time Calculation (min): 30 min    Assessment  IP OT Assessment  End of Session Communication: Bedside nurse  End of Session Patient Position: Bed, 3 rail up, Alarm on (All needs in reach)  Plan:  Treatment Interventions: ADL retraining, Functional transfer training, UE strengthening/ROM, Endurance training, Cognitive reorientation, Patient/family training, Equipment evaluation/education, Compensatory technique education  OT Frequency: 4 times per week  OT Discharge Recommendations: Moderate intensity level of continued care  OT - OK to Discharge: Yes (To next level of care when deemed medically appropriate.)    Subjective   Current Problem:  1. Hyperkalemia        2. Diabetic ulcer of other part of left foot associated with diabetes mellitus due to underlying condition, unspecified ulcer stage (CMS/HCC)        3. Generalized weakness          General:  General  Reason for Referral: ADL, safety assessment, functional cognitive evaluation  Referred By: Froy  Past Medical History Relevant to Rehab: admitted 12/16 for hyperkalemia, hypoglycemia, UTI, encephalopathy, tremors, L foot ulcer, weakness.  CT head 12/17: chronic L occipital infarct,  chronic bilat. basal ganglia lacunar infarcts.(-) acute.  PMH: T2DM, CVAs, L foot ulcer, essential tremor, foot/ankle surgery, MARIAH, CKD, SLE, Afib, HTN, COPD, GERD.  Family/Caregiver Present:  (Son López (who pt. lives with) on phone to provide PLOF.)  Co-Treatment: PT  Prior to Session Communication: Bedside nurse  Patient Position Received: Bed, 3 rail up, Alarm on (bilat. soft wrist restraints on)  General Comment: Pt agreeable to OT, nursing cleared for treatment.  Precautions:  Medical Precautions: Fall precautions  Vital Signs:     Pain:  Pain Assessment  Pain Assessment: 0-10  Pain Score: 5 - Moderate  pain  Pain Type: Acute pain  Pain Location:  (Right nare (packing in nare))    Objective   Cognition:  Overall Cognitive Status: Impaired (hallucinating)  Orientation Level: Disoriented to place, Disoriented to situation  Processing Speed: Delayed           Home Living:  Type of Home: House  Lives With:  (son)  Home Adaptive Equipment: Walker rolling or standard (rollator)  Home Layout: Multi-level, Stairs to alternate level with rails, 1/2 bath on main level (basement shower and shower upstairs)  Alternate Level Stairs-Number of Steps: 15 to upstairs and 12 down to basement  Home Access: Stairs to enter without rails  Bathroom Shower/Tub: Walk-in shower  Bathroom Equipment: Shower chair with back (toilet seat riser)  Home Living Comments: Currently son is home with pt 24/7. Son reports in 2024 he will be working outside of the home, and that family can assist to fill in, but there will be gaps when pt. would be alone.   Prior Function:  ADL Assistance: Independent (only sponge bathing)  Homemaking Assistance: Needs assistance  Ambulatory Assistance:  (mod I with FWW short distances)  Prior Function Comments: son denies any additional falls in the past 6 months  IADL History:     ADL:  Eating Assistance: Minimal  Grooming Assistance: Moderate  Bathing Assistance: Maximal  UE Dressing Assistance: Moderate  LE Dressing Assistance: Total  Toileting Assistance with Device: Total  Activity Tolerance:  Endurance:  (impaired)  Bed Mobility/Transfers: Bed Mobility  Bed Mobility: Yes  Bed Mobility 1  Bed Mobility 1: Supine to sitting  Level of Assistance 1: Moderate assistance, Moderate verbal cues  Bed Mobility 2  Bed Mobility  2: Sitting to supine  Level of Assistance 2: Maximum assistance, Moderate verbal cues (x2)  Bed Mobility 3  Bed Mobility 3: Scooting  Level of Assistance 3: Dependent    Transfers  Transfer: Yes  Transfer 1  Transfer From 1: Bed to  Transfer to 1: Stand  Technique 1: Sit to stand  Transfer Device 1:  Walker, Gait belt  Transfer Level of Assistance 1: Moderate assistance, +2  Trials/Comments 1: assist with forward trunk and boost, retropulsive.  cues for technique and balance corrections.  Transfers 2  Transfer From 2: Stand to  Transfer to 2: Bed  Technique 2: Stand to sit  Transfer Device 2: Walker (gait belt)  Transfer Level of Assistance 2: Moderate assistance, Moderate verbal cues, +2  Trials/Comments 2: Patient's right nostral began to ooze/drip blood around the packing,after sitting down on bed; RN came to bedside to assist. Bleeding was controlled.      Ambulation/Gait Training:  Ambulation/Gait Training  Ambulation/Gait Training Performed: Yes (Side steps at EOB with FWW, 2 mod assist.  Retropulsive, weight on heels, difficulty maintaining balance, difficulty advancing LEs and managing FWW.)  Sitting Balance:  Static Sitting Balance  Static Sitting-Comment/Number of Minutes: Good (-)  Dynamic Sitting Balance  Dynamic Sitting-Comments: Fair  Standing Balance:  Static Standing Balance  Static Standing-Comment/Number of Minutes: Poor (+)  Dynamic Standing Balance  Dynamic Standing-Comments: Poor    Coordination Comment: Jerking movements noted   Hand Function:     Extremities: RUE   RUE :  (R shoulder ~ 120 degrees.  elbow and wrist AROM grossly functional.  Joint deformities of all digits.), LUE   LUE:  (L shoulder ~ 90 degrees. elbow and wrist AROM grossly functional. Joint deformities of all digits.),  , and        Outcome Measures: [facility] Daily Activity  Putting on and taking off regular lower body clothing: Total  Bathing (including washing, rinsing, drying): A lot  Putting on and taking off regular upper body clothing: A lot  Toileting, which includes using toilet, bedpan or urinal: Total  Taking care of personal grooming such as brushing teeth: A lot  Eating Meals: A little  Daily Activity - Total Score: 11      Education Documentation  ADL Training, taught by Bing Benitez OT at 12/19/2023   6:59 PM.  Learner: Patient  Readiness: Acceptance  Method: Explanation  Response: Verbalizes Understanding, Needs Reinforcement    Education Comments  No comments found.      Goals:   Encounter Problems       Encounter Problems (Active)       OT Goals       Good dynamic sitting balance for ADL.        Start:  12/19/23    Expected End:  01/02/24            Fair (-) dynamic standing balance for ADL.        Start:  12/19/23    Expected End:  01/02/24            Min assist sit/stand, bed/chair/commode with FWW.        Start:  12/19/23    Expected End:  01/02/24            Min assist toileting,        Start:  12/19/23    Expected End:  01/02/24            Min assist grooming.        Start:  12/19/23    Expected End:  01/02/24

## 2023-12-20 NOTE — PROGRESS NOTES
Physical Therapy    Physical Therapy    Physical Therapy Treatment    Patient Name: Bing Holliday  MRN: [id number]  Today's Date: 12/20/2023  Time Calculation  Start Time: 1332  Stop Time: 1356  Time Calculation (min): 24 min       Assessment/Plan   PT Assessment  PT Assessment Results: Decreased strength, Decreased endurance, Impaired balance, Decreased mobility  Rehab Prognosis: Good  Evaluation/Treatment Tolerance: Patient limited by fatigue  Medical Staff Made Aware: Yes  Barriers to Participation: Ability to acquire knowledge  End of Session Communication: Bedside nurse  Assessment Comment: Increased time to complete activities. Pt is confused this session, nurse aware.  End of Session Patient Position: Up in chair  PT Plan  Inpatient/Swing Bed or Outpatient: Inpatient  PT Plan  Treatment/Interventions: Bed mobility, Transfer training, Gait training, Balance training, Neuromuscular re-education, Strengthening, Endurance training, Range of motion, Therapeutic exercise, Therapeutic activity, Home exercise program  PT Plan: Skilled PT  PT Frequency: 3 times per week  PT Discharge Recommendations: Moderate intensity level of continued care  PT Recommended Transfer Status: Assist x2 (FWW, gait belt)  PT - OK to Discharge: Yes      12/20/23 1332   PT  Visit   PT Received On 12/20/23   Response to Previous Treatment Patient with no complaints from previous session.   General   Prior to Session Communication Bedside nurse   Patient Position Received Bed, 3 rail up   General Comment Pt agreeable to therapy and cleared for participation.   Precautions   Medical Precautions Fall precautions   Pain Assessment   Pain Assessment 0-10   Pain Score 0 - No pain   Effect of Pain on Daily Activities .   Cognition   Overall Cognitive Status Impaired   Therapeutic Exercise   Therapeutic Exercise Performed Yes   Therapeutic Exercise Activity 1 AP/LAQ/ABD/ADD x15   Bed Mobility   Bed Mobility Yes   Bed Mobility 1   Bed Mobility 1  Supine to sitting   Level of Assistance 1 Minimum assistance;Contact guard   Bed Mobility Comments 1 Increased time to complete with min cues to square hips for feet flat on floor.   Ambulation/Gait Training   Ambulation/Gait Training Performed Yes   Ambulation/Gait Training 1   Surface 1 Level tile   Device 1 Rolling walker   Gait Support Devices Gait belt   Assistance 1 Moderate assistance   Quality of Gait 1 Knee(s) buckle;Soft knee(s)   Comments/Distance (ft) 1 5'x2 bed>BSC>chair, B knees are soft and unsteady, R knee buckling.   Transfers   Transfer Yes   Transfer 1   Transfer From 1 Bed to   Transfer to 1 Commode-standard;Chair with arms   Technique 1 Sit to stand;Stand to sit   Transfer Device 1 Gait belt;Walker   Transfer Level of Assistance 1 Moderate assistance;+2;Moderate verbal cues;Moderate tactile cues   Trials/Comments 1 Bed>BSC>chair- mod cues for turning sequence for increased safety and stability d/t buckling knees.   Activity Tolerance   Endurance Tolerates 10 - 20 min exercise with multiple rests   PT Assessment   PT Assessment Results Decreased strength;Decreased endurance;Impaired balance;Decreased mobility   Rehab Prognosis Good   End of Session Communication Bedside nurse   Assessment Comment Increased time to complete activities. Pt is confused this session, nurse aware.   End of Session Patient Position Up in chair     Outcome Measures:  [facility] Basic Mobility  Turning from your back to your side while in a flat bed without using bedrails: A lot  Moving from lying on your back to sitting on the side of a flat bed without using bedrails: A lot  Moving to and from bed to chair (including a wheelchair): A lot  Standing up from a chair using your arms (e.g. wheelchair or bedside chair): A lot  To walk in hospital room: A lot  Climbing 3-5 steps with railing: Total  Basic Mobility - Total Score: 11  Education Documentation  Home Exercise Program, taught by Lora Herzog PTA at 12/20/2023  2:39  PM.  Learner: Patient  Readiness: Acceptance  Method: Explanation  Response: Verbalizes Understanding, Demonstrated Understanding, Needs Reinforcement    Precautions, taught by Lora Herzog PTA at 12/20/2023  2:39 PM.  Learner: Patient  Readiness: Acceptance  Method: Explanation  Response: Verbalizes Understanding, Demonstrated Understanding, Needs Reinforcement    Body Mechanics, taught by Lora Herzog PTA at 12/20/2023  2:39 PM.  Learner: Patient  Readiness: Acceptance  Method: Explanation  Response: Verbalizes Understanding, Demonstrated Understanding, Needs Reinforcement    Mobility Training, taught by Lora Herzog PTA at 12/20/2023  2:39 PM.  Learner: Patient  Readiness: Acceptance  Method: Explanation  Response: Verbalizes Understanding, Demonstrated Understanding, Needs Reinforcement    Education Comments  No comments found.            EDUCATION:  Outpatient Education  Individual(s) Educated: Patient  Education Provided: Fall Risk    GOALS:  Encounter Problems       Encounter Problems (Active)       PT Problem       Pt will perform bed mobility with min A.   (Progressing)       Start:  12/19/23    Expected End:  01/02/24            Pt will complete sit <> stand and bed <> chair transfers with min A.   (Progressing)       Start:  12/19/23    Expected End:  01/02/24            Pt will ambulate 30 feet mod A using FWW with no LOB. (Progressing)       Start:  12/19/23    Expected End:  01/02/24            Pt will progress to completing 3 x 20 supine/seated exercises in order to increase strength and improve gait mechanics.   (Progressing)       Start:  12/19/23    Expected End:  01/02/24               Pain - Adult                  '

## 2023-12-20 NOTE — PROGRESS NOTES
Spiritual Care Visit    Clinical Encounter Type  Visited With: Patient  Routine Visit: Introduction  Continue Visiting: No                                            Taxonomy  Intended Effects: Preserve dignity and respect, Promote sense of peace  Methods: Offer support  Interventions: Share words of hope and inspiration, [place]    Patient shared her troubles and remembered the  from previous visits.   prayed at her request and was a supportive presence.

## 2023-12-21 LAB
ALBUMIN SERPL BCP-MCNC: 2.8 G/DL (ref 3.4–5)
ANION GAP SERPL CALC-SCNC: 10 MMOL/L (ref 10–20)
BASOPHILS # BLD MANUAL: 0 X10*3/UL (ref 0–0.1)
BASOPHILS NFR BLD MANUAL: 0 %
BUN SERPL-MCNC: 30 MG/DL (ref 6–23)
BURR CELLS BLD QL SMEAR: ABNORMAL
CALCIUM SERPL-MCNC: 8.1 MG/DL (ref 8.6–10.3)
CHLORIDE SERPL-SCNC: 110 MMOL/L (ref 98–107)
CO2 SERPL-SCNC: 25 MMOL/L (ref 21–32)
CREAT SERPL-MCNC: 1.98 MG/DL (ref 0.5–1.05)
EOSINOPHIL # BLD MANUAL: 0.05 X10*3/UL (ref 0–0.7)
EOSINOPHIL NFR BLD MANUAL: 1 %
ERYTHROCYTE [DISTWIDTH] IN BLOOD BY AUTOMATED COUNT: 20.9 % (ref 11.5–14.5)
GFR SERPL CREATININE-BSD FRML MDRD: 28 ML/MIN/1.73M*2
GLUCOSE BLD MANUAL STRIP-MCNC: 105 MG/DL (ref 74–99)
GLUCOSE BLD MANUAL STRIP-MCNC: 131 MG/DL (ref 74–99)
GLUCOSE BLD MANUAL STRIP-MCNC: 239 MG/DL (ref 74–99)
GLUCOSE BLD MANUAL STRIP-MCNC: 258 MG/DL (ref 74–99)
GLUCOSE SERPL-MCNC: 88 MG/DL (ref 74–99)
HCT VFR BLD AUTO: 26.8 % (ref 36–46)
HGB BLD-MCNC: 7.7 G/DL (ref 12–16)
IMM GRANULOCYTES # BLD AUTO: 0.17 X10*3/UL (ref 0–0.7)
IMM GRANULOCYTES NFR BLD AUTO: 3.5 % (ref 0–0.9)
LYMPHOCYTES # BLD MANUAL: 0.98 X10*3/UL (ref 1.2–4.8)
LYMPHOCYTES NFR BLD MANUAL: 20 %
MAGNESIUM SERPL-MCNC: 1.92 MG/DL (ref 1.6–2.4)
MCH RBC QN AUTO: 29.3 PG (ref 26–34)
MCHC RBC AUTO-ENTMCNC: 28.7 G/DL (ref 32–36)
MCV RBC AUTO: 102 FL (ref 80–100)
MONOCYTES # BLD MANUAL: 0.34 X10*3/UL (ref 0.1–1)
MONOCYTES NFR BLD MANUAL: 7 %
MYELOCYTES # BLD MANUAL: 0.1 X10*3/UL
MYELOCYTES NFR BLD MANUAL: 2 %
NEUTS SEG # BLD MANUAL: 3.28 X10*3/UL (ref 1.2–7)
NEUTS SEG NFR BLD MANUAL: 67 %
NRBC BLD-RTO: 0.6 /100 WBCS (ref 0–0)
PHOSPHATE SERPL-MCNC: 3.4 MG/DL (ref 2.5–4.9)
PLATELET # BLD AUTO: 83 X10*3/UL (ref 150–450)
POLYCHROMASIA BLD QL SMEAR: ABNORMAL
POTASSIUM SERPL-SCNC: 4.4 MMOL/L (ref 3.5–5.3)
RBC # BLD AUTO: 2.63 X10*6/UL (ref 4–5.2)
RBC MORPH BLD: ABNORMAL
SCHISTOCYTES BLD QL SMEAR: ABNORMAL
SODIUM SERPL-SCNC: 141 MMOL/L (ref 136–145)
TOTAL CELLS COUNTED BLD: 100
VARIANT LYMPHS # BLD MANUAL: 0.15 X10*3/UL (ref 0–0.5)
VARIANT LYMPHS NFR BLD: 3 %
WBC # BLD AUTO: 4.9 X10*3/UL (ref 4.4–11.3)

## 2023-12-21 PROCEDURE — 82947 ASSAY GLUCOSE BLOOD QUANT: CPT

## 2023-12-21 PROCEDURE — 2500000001 HC RX 250 WO HCPCS SELF ADMINISTERED DRUGS (ALT 637 FOR MEDICARE OP)

## 2023-12-21 PROCEDURE — 80069 RENAL FUNCTION PANEL: CPT

## 2023-12-21 PROCEDURE — 94760 N-INVAS EAR/PLS OXIMETRY 1: CPT

## 2023-12-21 PROCEDURE — 1100000001 HC PRIVATE ROOM DAILY

## 2023-12-21 PROCEDURE — 99238 HOSP IP/OBS DSCHRG MGMT 30/<: CPT

## 2023-12-21 PROCEDURE — 2500000004 HC RX 250 GENERAL PHARMACY W/ HCPCS (ALT 636 FOR OP/ED)

## 2023-12-21 PROCEDURE — 36415 COLL VENOUS BLD VENIPUNCTURE: CPT

## 2023-12-21 PROCEDURE — 85007 BL SMEAR W/DIFF WBC COUNT: CPT

## 2023-12-21 PROCEDURE — 97535 SELF CARE MNGMENT TRAINING: CPT | Mod: GO,CO

## 2023-12-21 PROCEDURE — 85027 COMPLETE CBC AUTOMATED: CPT

## 2023-12-21 PROCEDURE — 83735 ASSAY OF MAGNESIUM: CPT

## 2023-12-21 RX ORDER — PREDNISONE 10 MG/1
10 TABLET ORAL EVERY MORNING
Status: DISCONTINUED | OUTPATIENT
Start: 2023-12-22 | End: 2023-12-23 | Stop reason: HOSPADM

## 2023-12-21 RX ORDER — PREDNISONE 10 MG/1
10 TABLET ORAL DAILY
Status: DISCONTINUED | OUTPATIENT
Start: 2023-12-21 | End: 2023-12-21

## 2023-12-21 RX ORDER — PREDNISONE 5 MG/1
5 TABLET ORAL DAILY
Status: DISCONTINUED | OUTPATIENT
Start: 2023-12-22 | End: 2023-12-23 | Stop reason: HOSPADM

## 2023-12-21 RX ADMIN — MYCOPHENOLATE MOFETIL 1000 MG: 250 CAPSULE ORAL at 20:38

## 2023-12-21 RX ADMIN — PANTOPRAZOLE SODIUM 40 MG: 40 TABLET, DELAYED RELEASE ORAL at 09:11

## 2023-12-21 RX ADMIN — MYCOPHENOLATE MOFETIL 1000 MG: 250 CAPSULE ORAL at 09:10

## 2023-12-21 RX ADMIN — AMLODIPINE BESYLATE 2.5 MG: 2.5 TABLET ORAL at 09:11

## 2023-12-21 RX ADMIN — ATORVASTATIN CALCIUM 40 MG: 40 TABLET, FILM COATED ORAL at 09:11

## 2023-12-21 RX ADMIN — RISPERIDONE 0.5 MG: 0.5 TABLET ORAL at 20:38

## 2023-12-21 RX ADMIN — PREDNISONE 20 MG: 20 TABLET ORAL at 09:11

## 2023-12-21 RX ADMIN — CEFTRIAXONE SODIUM 1 G: 1 INJECTION, SOLUTION INTRAVENOUS at 09:10

## 2023-12-21 RX ADMIN — PREDNISONE 10 MG: 10 TABLET ORAL at 14:37

## 2023-12-21 RX ADMIN — Medication 5 MG: at 20:38

## 2023-12-21 RX ADMIN — Medication 400 MG: at 09:11

## 2023-12-21 ASSESSMENT — COGNITIVE AND FUNCTIONAL STATUS - GENERAL
MOBILITY SCORE: 17
HELP NEEDED FOR BATHING: A LITTLE
STANDING UP FROM CHAIR USING ARMS: A LITTLE
DRESSING REGULAR UPPER BODY CLOTHING: A LITTLE
DAILY ACTIVITIY SCORE: 18
WALKING IN HOSPITAL ROOM: A LITTLE
TURNING FROM BACK TO SIDE WHILE IN FLAT BAD: A LITTLE
DRESSING REGULAR LOWER BODY CLOTHING: A LITTLE
PERSONAL GROOMING: A LITTLE
MOVING FROM LYING ON BACK TO SITTING ON SIDE OF FLAT BED WITH BEDRAILS: A LITTLE
TOILETING: A LOT
CLIMB 3 TO 5 STEPS WITH RAILING: A LOT
MOVING TO AND FROM BED TO CHAIR: A LITTLE

## 2023-12-21 ASSESSMENT — ACTIVITIES OF DAILY LIVING (ADL)
BATHING_WHERE_ASSESSED: EDGE OF BED
HOME_MANAGEMENT_TIME_ENTRY: 42
BATHING_LEVEL_OF_ASSISTANCE: MINIMUM ASSISTANCE

## 2023-12-21 ASSESSMENT — PAIN SCALES - GENERAL
PAINLEVEL_OUTOF10: 0 - NO PAIN

## 2023-12-21 ASSESSMENT — PAIN - FUNCTIONAL ASSESSMENT
PAIN_FUNCTIONAL_ASSESSMENT: 0-10
PAIN_FUNCTIONAL_ASSESSMENT: 0-10

## 2023-12-21 NOTE — PROGRESS NOTES
Occupational Therapy    OT Treatment    Patient Name: Bing Holliday  MRN: [id number]  Today's Date: 12/21/2023  Time Calculation  Start Time: 0923  Stop Time: 1005  Time Calculation (min): 42 min         Assessment:  Evaluation/Treatment Tolerance: Patient tolerated treatment well  End of Session Communication: Bedside nurse  End of Session Patient Position: Up in chair (pt requested no alarm)  OT Assessment Results: Decreased ADL status, Decreased upper extremity strength, Decreased upper extremity range of motion, Decreased endurance, Decreased fine motor control, Decreased functional mobility  Evaluation/Treatment Tolerance: Patient tolerated treatment well    Plan:  Treatment Interventions: ADL retraining, Functional transfer training, Endurance training  OT Frequency: 4 times per week  OT Discharge Recommendations: Moderate intensity level of continued care  Treatment Interventions: ADL retraining, Functional transfer training, Endurance training  Subjective     Current Problem:  Patient Active Problem List   Diagnosis    COVID-19    Lupus (CMS/[facility])    Ambulatory dysfunction    Myelodysplastic syndrome, unspecified (CMS/[facility])    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[facility])    Hyperlipidemia    Pneumonia of both lower lobes due to infectious organism    Hallucinations    Leg swelling    Hyperglycemia    JED (acute kidney injury) (CMS/[facility])    Hypernatremia    Proliferative diabetic retinopathy of right eye without macular edema determined by examination associated with type 2 diabetes mellitus (CMS/[facility])    Urinary incontinence    Weakness of both lower extremities    Abdominal cramping    Abnormal echocardiogram    Abnormal gait    Abnormal thyroid blood test    Advanced COPD (CMS/[facility])    Afferent pupillary defect of right eye    Anemia    Pancytopenia (CMS/HCC)    Altitudinal scotoma of right eye    Arcuate scotoma of left eye    Arthropathy    Asymptomatic PVCs    Atrial fibrillation (CMS/[facility])    Balance problem    Bilateral  high frequency sensorineural hearing loss    Bradycardia    Branch retinal artery occlusion    Cardiomyopathy (CMS/HCC)    Chronic diarrhea    Chronic fatigue    Chronic kidney disease (CKD), stage III (moderate) (CMS/HCC)    CKD stage G3a/A2, GFR 45-59 and albumin creatinine ratio  mg/g (CMS/HCC)    Combined forms of age-related cataract of left eye    Combined forms of age-related cataract of right eye    Contracture of hand    Snoring    CVA (cerebral vascular accident) (CMS/HCC)    Daytime somnolence    Degeneration of lumbar/lumbosacral disc without myelopathy    Depression, major    Dizziness    Dupuytren's contracture    Eczema    Elevated alkaline phosphatase level    Encephalopathy    Facial twitching    Fibromyalgia    Fungal nail infection    GERD (gastroesophageal reflux disease)    Gouty arthropathy    H/O iritis    H/O orthostatic hypotension    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HCC)    High level of cardiac marker    Hip hematoma, right    Hypothermia    Insomnia    Leg edema, left    Loss of consciousness (CMS/HCC)    Low blood sugar reading    Lung nodule, multiple    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HCC)    Metabolic encephalopathy    Muscle cramps    Nodule of skin of left lower leg    Obstructive lung disease (CMS/HCC)    Osteoarthritis of left hand    Osteoarthritis of right hand    Osteopenia    Osteoporosis    Palpitations    Peripheral visual field defect    Persistent proteinuria    Positive colorectal cancer screening using Cologuard test    Benign essential HTN    Psychosis (CMS/HCC)    Pulmonary hypertension (CMS/HCC)    Refractive error    Hypertensive retinopathy    Retinal neovascularization    Secondary pulmonary arterial hypertension (CMS/HCC)    Shortness of breath on exertion    Spinal stenosis of lumbar region with neurogenic claudication    Subjective tinnitus of both ears    Swelling of right foot    Syncope and collapse    Thrombophlebitis of superficial veins of left lower extremity     Tinnitus of left ear    Ulcer of foot, left, with unspecified severity (CMS/[facility])    Vitamin D deficiency    DVT (deep venous thrombosis) (CMS/[facility])    DM type 2 with diabetic peripheral neuropathy (CMS/[facility])    Systemic lupus erythematosus (CMS/[facility])    Diabetic nephropathy (CMS/[facility])    Functional diarrhea    UTI (urinary tract infection)    Moderate protein-calorie malnutrition (CMS/[facility])    Shock, unspecified (CMS/[facility])    Acute hyperkalemia    Hyperkalemia       General:  OT Received On: 12/21/23  Reason for Referral: impaired ADL's  Prior to Session Communication: Bedside nurse  Patient Position Received: Bed, 3 rail up  Preferred Learning Style: verbal  General Comment: Pt agreeable to tx and cleared for participation.    Vital Signs:       Pain:  Pain Assessment  Pain Assessment: 0-10  Pain Score: 0 - No pain  Objective      Activities of Daily Living:    Grooming  Grooming Level of Assistance: Setup  Grooming Where Assessed: Edge of bed  Grooming Comments: washed face and brushed teeth  UE Bathing  UE Bathing Level of Assistance: Setup  UE Bathing Where Assessed: Edge of bed  UE Bathing Comments: washed under arms and chest  LE Bathing  LE Bathing Level of Assistance: Minimum assistance  LE Bathing Where Assessed: Edge of bed  LE Bathing Comments: pt washed legs  UE Dressing  UE Dressing Level of Assistance: Minimum assistance  UE Dressing Where Assessed: Edge of bed  UE Dressing Comments: doffed/donned hosital gown  LE Dressing  LE Dressing:  (pt able to don R sock and assist to don L sock. Overall LB dressing at min A)  Toileting  Toileting Level of Assistance: Maximum assistance  Where Assessed: Bed level  Toileting Comments: Pt stood alongside EOB and therapist washed buttoccks and completed posterior candido care. Pt has been using pure wick    Functional Standing Tolerance:  Functional Standing Tolerance Comments: Pt currently has limited standing time due to leaving special shoes at home    Bed  Mobility/Transfers: Bed Mobility  Bed Mobility:  (SBA to EOB)  Transfers  Transfer:  (bed to chair at min A with ww support)                     Outcome Measures:[facility] Daily Activity  Putting on and taking off regular lower body clothing: A little  Bathing (including washing, rinsing, drying): A little  Putting on and taking off regular upper body clothing: A little  Toileting, which includes using toilet, bedpan or urinal: A lot  Taking care of personal grooming such as brushing teeth: A little  Eating Meals: None  Daily Activity - Total Score: 18      EDUCATION:  Education  Individual(s) Educated: Patient  Education Provided: Fall precautons  Patient Response to Education: Patient/Caregiver Verbalized Understanding of Information  Education Comment: Pt would benefit from continued reinforcement    Goals:  Encounter Problems       Encounter Problems (Active)       OT Goals       Good dynamic sitting balance for ADL.        Start:  12/19/23    Expected End:  01/02/24            Fair (-) dynamic standing balance for ADL.        Start:  12/19/23    Expected End:  01/02/24            Min assist sit/stand, bed/chair/commode with FWW.        Start:  12/19/23    Expected End:  01/02/24            Min assist toileting,        Start:  12/19/23    Expected End:  01/02/24            Min assist grooming.        Start:  12/19/23    Expected End:  01/02/24

## 2023-12-21 NOTE — CONSULTS
Wound Care Consult     Visit Date: 12/21/2023      Patient Name: Bing Holliday         MRN: [id number]           YOB: 1960     Reason for Consult: Left foot wound        Wound History: Present on admission     Pertinent Labs:   Albumin   Date Value Ref Range Status   12/21/2023 2.8 (L) 3.4 - 5.0 g/dL Final   04/29/2021 3.1 (L) 3.4 - 5.0 g/dL Final     Albumin, CSF   Date Value Ref Range Status   02/25/2022 16 0 - 35 mg/dL Final     Albumin Index   Date Value Ref Range Status   02/25/2022 7.6 0.0 - 9.0 ratio Final     Albumin, Serum   Date Value Ref Range Status   02/25/2022 2,099 (L) 3,500 - 5,200 mg/dL Final       Wound Assessment:  Wound 10/19/23 Dorsal foot;Left (Active)       Wound 12/17/23 Diabetic Ulcer Foot Left;Lateral (Active)   Wound Image   12/17/23 1531   Site Assessment Red 12/21/23 1508   Batsheva-Wound Assessment Calloused;Dry 12/21/23 1508   Wound Length (cm) 0.5 cm 12/21/23 1508   Wound Width (cm) 0.5 cm 12/21/23 1508   Wound Surface Area (cm^2) 0.25 cm^2 12/21/23 1508   Wound Depth (cm) 0.1 cm 12/21/23 1508   Wound Volume (cm^3) 0.025 cm^3 12/21/23 1508   Wound Healing % 60 12/21/23 1508   Margins Poorly defined 12/21/23 1508   Drainage Description None 12/21/23 1508   Drainage Amount None 12/17/23 0900   Dressing Foam 12/21/23 1508   Dressing Changed Changed 12/21/23 1508   Dressing Status Clean;Dry 12/21/23 1508       Wound Team Summary Assessment: Asked to see patient per nursing request for left foot wound. Dressing removed, no drainage noted. Patient appears to have Charcot's foot on the left. Wound bed red surrounded by dry hard calloused area. Wound cleanse with NS, foam dressing reapplied.      Wound Team Plan: Continue with foam dressing changes daily. Will follow.      Esme Meadows RN  12/21/2023  3:13 PM

## 2023-12-21 NOTE — DISCHARGE INSTRUCTIONS
Please call your primary care provider Dr. Hood and schedule a follow up appointment in 2-3 days.  During this appointment please discuss your hospitalization and further management of your blood thinner medication, Eliquis.    Please call the ear nose and throat doctor Dr. Rolle and schedule a follow-up appointment in 5 days.  During this appointment, they will remove your nasal packing, and discussed with you if it is okay to restart your Eliquis.    Please take all of your medications as directed.     Discontinued medications: Eliquis: Please stop taking this medication for the next 5 days or until your PCP or ENT surgeon tell you that you can resume taking this medication.    If you have any concerning symptoms, or worsening symptoms please call or return, such as chest pain, shortness of breath, new onset confusion, loss of sensation, or fever >103F.    Thank you for allowing us to participate in your health care.    -[facility].

## 2023-12-21 NOTE — CARE PLAN
The patient's goals for the shift include      The clinical goals for the shift include hemodynamic stability      Problem: Safety  Goal: Patient will be injury free during hospitalization  Outcome: Progressing  Goal: I will remain free of falls  Outcome: Progressing     Problem: Daily Care  Goal: Daily care needs are met  Outcome: Progressing     Problem: Psychosocial Needs  Goal: Demonstrates ability to cope with hospitalization/illness  Outcome: Progressing  Goal: Collaborate with me, my family, and caregiver to identify my specific goals  Outcome: Progressing     Problem: Discharge Barriers  Goal: My discharge needs are met  Outcome: Progressing     Problem: Safety - Adult  Goal: Free from fall injury  Outcome: Progressing     Problem: Discharge Planning  Goal: Discharge to home or other facility with appropriate resources  Outcome: Progressing     Problem: Pain - Adult  Goal: Verbalizes/displays adequate comfort level or baseline comfort level  Outcome: Progressing     Problem: Diabetes  Goal: Achieve decreasing blood glucose levels by end of shift  Outcome: Progressing  Goal: Increase stability of blood glucose readings by end of shift  Outcome: Progressing  Goal: Decrease in ketones present in urine by end of shift  Outcome: Progressing  Goal: Maintain electrolyte levels within acceptable range throughout shift  Outcome: Progressing  Goal: Maintain glucose levels >70mg/dl to <250mg/dl throughout shift  Outcome: Progressing  Goal: No changes in neurological exam by end of shift  Outcome: Progressing  Goal: Learn about and adhere to nutrition recommendations by end of shift  Outcome: Progressing  Goal: Vital signs within normal range for age by end of shift  Outcome: Progressing  Goal: Increase self care and/or family involovement by end of shift  Outcome: Progressing  Goal: Receive DSME education by end of shift  Outcome: Progressing

## 2023-12-21 NOTE — DISCHARGE SUMMARY
Discharge Diagnosis  Acute hyperkalemia    Issues Requiring Follow-Up  Epistaxis - R nare    Discharge Meds     Your medication list        CONTINUE taking these medications        Instructions Last Dose Given Next Dose Due   Dexcom G6  misc  Generic drug: Dexcom G4 platinum       Use as instructed       Dexcom G6 Sensor device  Generic drug: blood-glucose sensor      Use to check sugars 3 times daily       Dexcom G6 Transmitter device  Generic drug: Dexcom G4 platinum transmitter      Use as instructed              ASK your doctor about these medications        Instructions Last Dose Given Next Dose Due   acetaminophen 325 mg tablet  Commonly known as: Tylenol           amLODIPine 2.5 mg tablet  Commonly known as: Norvasc           apixaban 5 mg tablet  Commonly known as: Eliquis      Take 0.5 tablets (2.5 mg) by mouth 2 times a day.       atorvastatin 40 mg tablet  Commonly known as: Lipitor      Take 1 tablet (40 mg) by mouth once daily.       Benlysta 400 mg recon soln IV injection  Generic drug: belimumab           Ca carb-D3-mag ya-zwg-ojob-Zn 300 mg-20 mcg- 25 mg-0.5 mg tablet           folic acid 1 mg tablet  Commonly known as: Folvite      TAKE 1 TABLET BY MOUTH EVERY DAY       HYDROcodone-acetaminophen 5-325 mg tablet  Commonly known as: Norco      Take 1 tablet by mouth 2 times a day as needed for severe pain (7 - 10).       magnesium oxide 400 mg tablet  Commonly known as: Mag-Ox           melatonin 5 mg tablet           multivitamin tablet           mycophenolate 500 mg tablet  Commonly known as: Cellcept           nitrofurantoin (macrocrystal-monohydrate) 100 mg capsule  Commonly known as: Macrobid      Take 1 capsule (100 mg) by mouth 2 times a day for 10 days.       pantoprazole 40 mg EC tablet  Commonly known as: ProtoNix      TAKE 1 TABLET BY MOUTH EVERY DAY       predniSONE 10 mg tablet  Commonly known as: Deltasone      Take 1 tablet (10 mg) by mouth once daily in the morning.  "Take first thing when you wake up every morning.       predniSONE 5 mg tablet  Commonly known as: Deltasone      Take 1 tablet (5 mg) by mouth see administration instructions. Take one tablet everyday at 2PM scheduled       risperiDONE 0.5 mg tablet  Commonly known as: RisperDAL      TAKE 1 TABLET BY MOUTH AT BEDTIME       torsemide 10 mg tablet  Commonly known as: Demadex      TAKE 1 TABLET BY MOUTH EVERY DAY       Trelegy Ellipta 200-62.5-25 mcg blister with device  Generic drug: fluticasone-umeclidin-vilanter      Inhale 1 puff once daily.                Test Results Pending At Discharge  Pending Labs       No current pending labs.            Hospital Course   63-year-old female with past medical history significant for type 2 diabetes mellitus, CKD 3, SLE, to \"insert neuropathy on chronic prednisone, paroxysmal A-fib on Eliquis, hypertension, COPD, GERD initially presented for glucose of 68, was incidentally found to be hyperkalemic and was admitted for further workup and monitoring.  During admission patient was found to have a UTI with mixed antonio on urine culture, however earlier urine culture had Serratia as predominant species with susceptibility to ceftriaxone and she was treated with ceftriaxone.  Patient was encephalopathic secondary to her urinary tract infection that improved over hospital course with treatment of her UTI.  There may have also been a adrenal insufficiency component, as patient had marked improvement after an increase in her prednisone.  Regarding her hyperkalemia she was given Kayexalate and calcium gluconate in the emergency department with resolution.  Patient has been showing slow improvement in her mental status, regaining her orientation and medical capacity.  During her hospital course, patient also had an episode of epistaxis over the right nare that required packing and ENT consultation.  Temporarily discontinued her Eliquis for 7 days at the recommendation of ENT, and she will " follow-up with ENT for removal of nasal packing.  At the time of this discharge summary, she is medically stable for discharge to SNF.    Pertinent Physical Exam At Time of Discharge  Physical Exam  Vitals reviewed.   Constitutional:       General: She is not in acute distress.     Appearance: Normal appearance. She is not ill-appearing, toxic-appearing or diaphoretic.   HENT:      Head: Normocephalic and atraumatic.      Right Ear: External ear normal.      Left Ear: External ear normal.      Nose:      Comments: There is nasal packing present within the R nare.      Mouth/Throat:      Mouth: Mucous membranes are moist.      Pharynx: Oropharynx is clear.   Eyes:      Extraocular Movements: Extraocular movements intact.      Pupils: Pupils are equal, round, and reactive to light.   Cardiovascular:      Rate and Rhythm: Normal rate and regular rhythm.      Pulses: Normal pulses.      Heart sounds: Normal heart sounds. No murmur heard.     No friction rub. No gallop.   Pulmonary:      Effort: Pulmonary effort is normal. No respiratory distress.      Breath sounds: Normal breath sounds. No wheezing, rhonchi or rales.   Abdominal:      General: Abdomen is flat. Bowel sounds are normal. There is no distension.      Palpations: Abdomen is soft. There is no mass.      Tenderness: There is no abdominal tenderness.      Hernia: No hernia is present.   Musculoskeletal:         General: Normal range of motion.      Cervical back: Normal range of motion and neck supple.   Skin:     General: Skin is warm and dry.      Capillary Refill: Capillary refill takes less than 2 seconds.   Neurological:      General: No focal deficit present.      Mental Status: She is oriented to person, place, and time.      Comments: A/Ox2 to person and place   Psychiatric:         Mood and Affect: Mood normal.         Behavior: Behavior normal.   Outpatient Follow-Up  Future Appointments   Date Time Provider Department Center   12/26/2023  9:30 AM  Evaristo Rolle  [id number] [place]   1/4/2024  4:00 PM INF 05 NRID[facility]LE [id number] [place]   1/8/2024  3:40 PM Sarah Castellanos  [id number] [place]   2/1/2024  4:00 [id number] [place]   2/8/2024 11:30 AM Claudio Kearney MD [id number] [place]   2/13/2024 10:30 AM Michael Arenas MD [id number] [place]         Mynor Jennings

## 2023-12-21 NOTE — PROGRESS NOTES
Met with patient at the bedside and she is agreeable to [facility], message sent to dsc to start the pre cert.

## 2023-12-22 PROBLEM — L97.529: Chronic | Status: RESOLVED | Noted: 2023-10-30 | Resolved: 2023-12-22

## 2023-12-22 PROBLEM — I82.409 DVT (DEEP VENOUS THROMBOSIS) (MULTI): Chronic | Status: RESOLVED | Noted: 2023-10-30 | Resolved: 2023-12-22

## 2023-12-22 PROBLEM — E87.5 HYPERKALEMIA: Status: RESOLVED | Noted: 2023-12-17 | Resolved: 2023-12-22

## 2023-12-22 PROBLEM — E87.5 ACUTE HYPERKALEMIA: Status: RESOLVED | Noted: 2023-12-17 | Resolved: 2023-12-22

## 2023-12-22 LAB
GLUCOSE BLD MANUAL STRIP-MCNC: 124 MG/DL (ref 74–99)
GLUCOSE BLD MANUAL STRIP-MCNC: 132 MG/DL (ref 74–99)
GLUCOSE BLD MANUAL STRIP-MCNC: 160 MG/DL (ref 74–99)
GLUCOSE BLD MANUAL STRIP-MCNC: 163 MG/DL (ref 74–99)
GLUCOSE BLD MANUAL STRIP-MCNC: 180 MG/DL (ref 74–99)
GLUCOSE BLD MANUAL STRIP-MCNC: 250 MG/DL (ref 74–99)

## 2023-12-22 PROCEDURE — 2500000001 HC RX 250 WO HCPCS SELF ADMINISTERED DRUGS (ALT 637 FOR MEDICARE OP)

## 2023-12-22 PROCEDURE — 2500000004 HC RX 250 GENERAL PHARMACY W/ HCPCS (ALT 636 FOR OP/ED)

## 2023-12-22 PROCEDURE — 82947 ASSAY GLUCOSE BLOOD QUANT: CPT

## 2023-12-22 PROCEDURE — 97535 SELF CARE MNGMENT TRAINING: CPT | Mod: GO | Performed by: OCCUPATIONAL THERAPIST

## 2023-12-22 PROCEDURE — 97530 THERAPEUTIC ACTIVITIES: CPT | Mod: GP,CQ

## 2023-12-22 PROCEDURE — 97530 THERAPEUTIC ACTIVITIES: CPT | Mod: GO | Performed by: OCCUPATIONAL THERAPIST

## 2023-12-22 PROCEDURE — 1100000001 HC PRIVATE ROOM DAILY

## 2023-12-22 PROCEDURE — 97110 THERAPEUTIC EXERCISES: CPT | Mod: GP,CQ

## 2023-12-22 RX ADMIN — MYCOPHENOLATE MOFETIL 1000 MG: 250 CAPSULE ORAL at 10:42

## 2023-12-22 RX ADMIN — MYCOPHENOLATE MOFETIL 1000 MG: 250 CAPSULE ORAL at 21:58

## 2023-12-22 RX ADMIN — Medication 400 MG: at 08:54

## 2023-12-22 RX ADMIN — RISPERIDONE 0.5 MG: 0.5 TABLET ORAL at 20:42

## 2023-12-22 RX ADMIN — CEFTRIAXONE SODIUM 1 G: 1 INJECTION, SOLUTION INTRAVENOUS at 08:54

## 2023-12-22 RX ADMIN — ATORVASTATIN CALCIUM 40 MG: 40 TABLET, FILM COATED ORAL at 08:54

## 2023-12-22 RX ADMIN — AMLODIPINE BESYLATE 2.5 MG: 2.5 TABLET ORAL at 08:54

## 2023-12-22 RX ADMIN — PANTOPRAZOLE SODIUM 40 MG: 40 TABLET, DELAYED RELEASE ORAL at 08:54

## 2023-12-22 RX ADMIN — PREDNISONE 10 MG: 10 TABLET ORAL at 08:54

## 2023-12-22 RX ADMIN — PREDNISONE 5 MG: 5 TABLET ORAL at 13:39

## 2023-12-22 RX ADMIN — Medication 5 MG: at 20:42

## 2023-12-22 ASSESSMENT — COGNITIVE AND FUNCTIONAL STATUS - GENERAL
DRESSING REGULAR LOWER BODY CLOTHING: A LITTLE
MOBILITY SCORE: 17
MOBILITY SCORE: 17
WALKING IN HOSPITAL ROOM: A LITTLE
PERSONAL GROOMING: A LITTLE
DAILY ACTIVITIY SCORE: 18
WALKING IN HOSPITAL ROOM: A LITTLE
CLIMB 3 TO 5 STEPS WITH RAILING: A LOT
DRESSING REGULAR UPPER BODY CLOTHING: A LITTLE
TOILETING: A LITTLE
HELP NEEDED FOR BATHING: A LITTLE
EATING MEALS: A LITTLE
TOILETING: A LITTLE
HELP NEEDED FOR BATHING: A LITTLE
PERSONAL GROOMING: A LITTLE
TURNING FROM BACK TO SIDE WHILE IN FLAT BAD: A LITTLE
DAILY ACTIVITIY SCORE: 18
DRESSING REGULAR LOWER BODY CLOTHING: A LITTLE
DRESSING REGULAR UPPER BODY CLOTHING: A LITTLE
MOVING FROM LYING ON BACK TO SITTING ON SIDE OF FLAT BED WITH BEDRAILS: A LITTLE
STANDING UP FROM CHAIR USING ARMS: A LITTLE
MOVING TO AND FROM BED TO CHAIR: A LITTLE
CLIMB 3 TO 5 STEPS WITH RAILING: A LOT
MOVING TO AND FROM BED TO CHAIR: A LITTLE
EATING MEALS: A LITTLE
STANDING UP FROM CHAIR USING ARMS: A LITTLE
MOVING FROM LYING ON BACK TO SITTING ON SIDE OF FLAT BED WITH BEDRAILS: A LITTLE
TURNING FROM BACK TO SIDE WHILE IN FLAT BAD: A LITTLE

## 2023-12-22 ASSESSMENT — PAIN - FUNCTIONAL ASSESSMENT
PAIN_FUNCTIONAL_ASSESSMENT: 0-10
PAIN_FUNCTIONAL_ASSESSMENT: 0-10

## 2023-12-22 ASSESSMENT — PAIN SCALES - GENERAL
PAINLEVEL_OUTOF10: 0 - NO PAIN
PAINLEVEL_OUTOF10: 4

## 2023-12-22 ASSESSMENT — ACTIVITIES OF DAILY LIVING (ADL)
EFFECT OF PAIN ON DAILY ACTIVITIES: .
HOME_MANAGEMENT_TIME_ENTRY: 15

## 2023-12-22 NOTE — PROGRESS NOTES
Message from [facility], once we have the pre cert she can admit to [facility] on Saturday, Dec 23. Will need a 7000 and wheelchair transport arranged.     1120: Message to direct precert team to check the insurance and escalate as approriate.     1300: Patient has pre cert for [facility] , facility can accept Saturday, Dec 23 am.     1315: Per Dr. Lowe patient must follow up with Dr. Rolle on Tuesday, Dec 26 to 0900 to remove the nasal packing. Message to facility to if they can accommodate this appointment.

## 2023-12-22 NOTE — PROGRESS NOTES
Physical Therapy    Physical Therapy    Physical Therapy Treatment    Patient Name: Bing Holliday  MRN: [id number]  Today's Date: 12/22/2023  Time Calculation  Start Time: 1002  Stop Time: 1025  Time Calculation (min): 23 min       Assessment/Plan   PT Assessment  PT Assessment Results: Decreased strength, Decreased endurance, Impaired balance, Decreased mobility  Rehab Prognosis: Good  Evaluation/Treatment Tolerance: Patient limited by fatigue  Medical Staff Made Aware: Yes  Barriers to Participation: Ability to acquire knowledge  End of Session Communication: Bedside nurse  Assessment Comment: Decreased knee buckling this session, however, continues to be unsteady. Unable to ambulate at this time d/t not having proper footwear for B feet deformites. Seated rest breaks as necessary.  End of Session Patient Position: Up in chair, Alarm on  PT Plan  Inpatient/Swing Bed or Outpatient: Inpatient  PT Plan  Treatment/Interventions: Bed mobility, Transfer training, Gait training, Balance training, Neuromuscular re-education, Strengthening, Endurance training, Range of motion, Therapeutic exercise, Therapeutic activity, Home exercise program  PT Plan: Skilled PT  PT Frequency: 3 times per week  PT Discharge Recommendations: Moderate intensity level of continued care  PT Recommended Transfer Status: Assist x2 (FWW, gait belt)  PT - OK to Discharge: Yes      12/22/23 1002   PT  Visit   PT Received On 12/22/23   Response to Previous Treatment Patient with no complaints from previous session.   General   Prior to Session Communication Bedside nurse   Patient Position Received Up in chair;Alarm on   General Comment Pt pleasant and agreeable to therapy, cleared for participation.   Precautions   Medical Precautions Fall precautions   Pain Assessment   Pain Assessment 0-10   Pain Score 0 - No pain   Effect of Pain on Daily Activities .   Cognition   Overall Cognitive Status WFL   Therapeutic Exercise   Therapeutic Exercise  Performed Yes   Therapeutic Exercise Activity 1 AP/LAQ/ABD/ADD/march/QS/GS x20   Therapeutic Activity   Therapeutic Activity Performed Yes   Therapeutic Activity 1 standing balance activities for increased stability and strength. Mildly unsteady, minAx1 for safety with chair behind pt. B UE support.   Transfers   Transfer Yes   Transfer 1   Transfer From 1 Chair with arms to   Transfer to 1 Stand   Technique 1 Stand to sit;Sit to stand   Transfer Device 1 Walker;Gait belt   Transfer Level of Assistance 1 Minimum assistance;Moderate verbal cues;Moderate tactile cues;Moderate assistance   Trials/Comments 1 Consecutuive STS transfers for increased strength, stability and endurnace with functional transfers. mildly unsteady.   Activity Tolerance   Endurance Tolerates 10 - 20 min exercise with multiple rests   PT Assessment   PT Assessment Results Decreased strength;Decreased endurance;Impaired balance;Decreased mobility   Rehab Prognosis Good   End of Session Communication Bedside nurse   Assessment Comment Decreased knee buckling this session, however, continues to be unsteady. Unable to ambulate at this time d/t not having proper footwear for B feet deformites. Seated rest breaks as necessary.   End of Session Patient Position Up in chair;Alarm on     Outcome Measures:  [facility] Basic Mobility  Turning from your back to your side while in a flat bed without using bedrails: A little  Moving from lying on your back to sitting on the side of a flat bed without using bedrails: A little  Moving to and from bed to chair (including a wheelchair): A little  Standing up from a chair using your arms (e.g. wheelchair or bedside chair): A little  To walk in hospital room: A little  Climbing 3-5 steps with railing: A lot  Basic Mobility - Total Score: 17  Education Documentation  ADL Training, taught by Lora Herzog PTA at 12/22/2023 11:08 AM.  Learner: Patient  Readiness: Acceptance  Method: Explanation, Demonstration  Response:  Verbalizes Understanding, Demonstrated Understanding    Home Exercise Program, taught by Lora Herzog PTA at 12/22/2023 11:08 AM.  Learner: Patient  Readiness: Acceptance  Method: Explanation, Demonstration  Response: Verbalizes Understanding, Demonstrated Understanding    Precautions, taught by Lora Herzog PTA at 12/22/2023 11:08 AM.  Learner: Patient  Readiness: Acceptance  Method: Explanation, Demonstration  Response: Verbalizes Understanding, Demonstrated Understanding    Body Mechanics, taught by Lora Herzog PTA at 12/22/2023 11:08 AM.  Learner: Patient  Readiness: Acceptance  Method: Explanation, Demonstration  Response: Verbalizes Understanding, Demonstrated Understanding    Mobility Training, taught by Lora Herzog PTA at 12/22/2023 11:08 AM.  Learner: Patient  Readiness: Acceptance  Method: Explanation, Demonstration  Response: Verbalizes Understanding, Demonstrated Understanding    Education Comments  No comments found.            EDUCATION:  Outpatient Education  Individual(s) Educated: Patient  Education Provided: Fall Risk    GOALS:  Encounter Problems       Encounter Problems (Active)       PT Problem       Pt will perform bed mobility with min A.   (Progressing)       Start:  12/19/23    Expected End:  01/02/24            Pt will complete sit <> stand and bed <> chair transfers with min A.   (Progressing)       Start:  12/19/23    Expected End:  01/02/24            Pt will ambulate 30 feet mod A using FWW with no LOB. (Progressing)       Start:  12/19/23    Expected End:  01/02/24            Pt will progress to completing 3 x 20 supine/seated exercises in order to increase strength and improve gait mechanics.   (Progressing)       Start:  12/19/23    Expected End:  01/02/24               Pain - Adult

## 2023-12-22 NOTE — PROGRESS NOTES
Occupational Therapy    OT Treatment    Patient Name: Bing Holliday  MRN: [id number]  Today's Date: 12/22/2023  Time Calculation  Start Time: 0845  Stop Time: 0915  Time Calculation (min): 30 min         Assessment:          Plan:  OT Frequency: 4 times per week  OT Discharge Recommendations: Moderate intensity level of continued care     Subjective     Current Problem:  Patient Active Problem List   Diagnosis    COVID-19    Lupus (CMS/[facility])    Ambulatory dysfunction    Myelodysplastic syndrome, unspecified (CMS/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)    Hyperlipidemia    Pneumonia of both lower lobes due to infectious organism    Hallucinations    Leg swelling    Hyperglycemia    JED (acute kidney injury) (CMS/[facility])    Hypernatremia    Proliferative diabetic retinopathy of right eye without macular edema determined by examination associated with type 2 diabetes mellitus (CMS/[facility])    Urinary incontinence    Weakness of both lower extremities    Abdominal cramping    Abnormal echocardiogram    Abnormal gait    Abnormal thyroid blood test    Advanced COPD (CMS/[facility])    Afferent pupillary defect of right eye    Anemia    Pancytopenia (CMS/[facility])    Altitudinal scotoma of right eye    Arcuate scotoma of left eye    Arthropathy    Asymptomatic PVCs    Atrial fibrillation (CMS/[facility])    Balance problem    Bilateral high frequency sensorineural hearing loss    Bradycardia    Branch retinal artery occlusion    Cardiomyopathy (CMS/[facility])    Chronic diarrhea    Chronic fatigue    Chronic kidney disease (CKD), stage III (moderate) (CMS/[facility])    CKD stage G3a/A2, GFR 45-59 and albumin creatinine ratio  mg/g (CMS/[facility])    Combined forms of age-related cataract of left eye    Combined forms of age-related cataract of right eye    Contracture of hand    Snoring    CVA (cerebral vascular accident) (CMS/[facility])    Daytime somnolence    Degeneration of lumbar/lumbosacral disc without myelopathy    Depression, major    Dizziness    Dupuytren's  contracture    Eczema    Elevated alkaline phosphatase level    Encephalopathy    Facial twitching    Fibromyalgia    Fungal nail infection    GERD (gastroesophageal reflux disease)    Gouty arthropathy    H/O iritis    H/O orthostatic hypotension    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HCC)    High level of cardiac marker    Hip hematoma, right    Hypothermia    Insomnia    Leg edema, left    Loss of consciousness (CMS/HCC)    Low blood sugar reading    Lung nodule, multiple    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HCC)    Metabolic encephalopathy    Muscle cramps    Nodule of skin of left lower leg    Obstructive lung disease (CMS/HCC)    Osteoarthritis of left hand    Osteoarthritis of right hand    Osteopenia    Osteoporosis    Palpitations    Peripheral visual field defect    Persistent proteinuria    Positive colorectal cancer screening using Cologuard test    Benign essential HTN    Psychosis (CMS/HCC)    Pulmonary hypertension (CMS/HCC)    Refractive error    Hypertensive retinopathy    Retinal neovascularization    Secondary pulmonary arterial hypertension (CMS/HCC)    Shortness of breath on exertion    Spinal stenosis of lumbar region with neurogenic claudication    Subjective tinnitus of both ears    Swelling of right foot    Syncope and collapse    Thrombophlebitis of superficial veins of left lower extremity    Tinnitus of left ear    Ulcer of foot, left, with unspecified severity (CMS/HCC)    Vitamin D deficiency    DVT (deep venous thrombosis) (CMS/HCC)    DM type 2 with diabetic peripheral neuropathy (CMS/HCC)    Systemic lupus erythematosus (CMS/HCC)    Diabetic nephropathy (CMS/HCC)    Functional diarrhea    UTI (urinary tract infection)    Moderate protein-calorie malnutrition (CMS/HCC)    Shock, unspecified (CMS/HCC)    Acute hyperkalemia    Hyperkalemia       General:  OT Received On: 12/22/23  Reason for Referral: Increased blood sugars, UTI. confusion.  Referred By: Xuan Moreno MD  General Comment: Pt has Lt  ankle non healing fx/ plates.  Pure wick, chair check    Vital Signs:       Pain:  Pain Assessment  Pain Assessment: 0-10  Pain Score: 4  Pain Type:  (Hurts more after ambulating long distances)  Pain Location: Foot  Pain Orientation:  (Lt)  Objective      Activities of Daily Living:    Grooming  Grooming Comments: Pt performed brushing teeth and combing hair . washing face. seated level supervised set up.  UE Bathing  UE Bathing Comments: Pt used moist wipes for UE bathing seated up in chair/ supervised set up.                Functional Standing Tolerance:       Bed Mobility/Transfers: Bed Mobility  Bed Mobility: Yes  Bed Mobility 1  Bed Mobility 1: Supine to sitting  Level of Assistance 1: Minimum assistance  Transfers  Transfer: Yes  Transfer 1  Transfer From 1: Bed to  Transfer to 1: Chair with arms  Transfer Device 1: Walker  Transfer Level of Assistance 1: Contact guard, Minimum assistance  Trials/Comments 1: Pt able to transfer bed to chair with walker with contact guard to MIn A.  Pt shaky at times and moves slowly secondary to Lt foor chronic issues. Pt then ambuated in room a few ft forward and back x2. Pt contact guard to MIn A.. Pt has purewick and is incontinenty unable to unhook purewick with ambulating.                Therapy/Activity:               Strength:       Other Activity:       Outcome Measures:[facility] Daily Activity  Putting on and taking off regular lower body clothing: A little  Bathing (including washing, rinsing, drying): A little  Putting on and taking off regular upper body clothing: A little  Toileting, which includes using toilet, bedpan or urinal: A little  Taking care of personal grooming such as brushing teeth: A little  Eating Meals: A little  Daily Activity - Total Score: 18  Education Documentation  ADL Training, taught by Geovanny Calle OT at 12/22/2023 11:15 AM.  Learner: Patient  Readiness: Acceptance  Method: Explanation, Demonstration  Response: Verbalizes Understanding,  Demonstrated Understanding  Comment: Pt able to perform grooming activities with set up and assist and cues.    Home Exercise Program, taught by Geovanny Calle OT at 12/22/2023 11:15 AM.  Learner: Patient  Readiness: Acceptance  Method: Explanation, Demonstration  Response: Verbalizes Understanding, Demonstrated Understanding  Comment: Pt able to perform grooming activities with set up and assist and cues.    Precautions, taught by Geovanny Calle OT at 12/22/2023 11:15 AM.  Learner: Patient  Readiness: Acceptance  Method: Explanation, Demonstration  Response: Verbalizes Understanding, Demonstrated Understanding  Comment: Pt able to perform grooming activities with set up and assist and cues.    Body Mechanics, taught by Geovanny Calle OT at 12/22/2023 11:15 AM.  Learner: Patient  Readiness: Acceptance  Method: Explanation, Demonstration  Response: Verbalizes Understanding, Demonstrated Understanding  Comment: Pt able to perform grooming activities with set up and assist and cues.    Mobility Training, taught by Geovanny Calle OT at 12/22/2023 11:15 AM.  Learner: Patient  Readiness: Acceptance  Method: Explanation, Demonstration  Response: Verbalizes Understanding, Demonstrated Understanding  Comment: Pt able to perform grooming activities with set up and assist and cues.    Education Comments  No comments found.        EDUCATION:  Education  Individual(s) Educated: Patient  Education Provided: Fall precautons  Patient Response to Education: Patient/Caregiver Verbalized Understanding of Information  Education Comment: Pt would benefit from continued reinforcement    Goals:  Encounter Problems       Encounter Problems (Active)       OT Goals       Good dynamic sitting balance for ADL.        Start:  12/19/23    Expected End:  01/02/24            Fair (-) dynamic standing balance for ADL.        Start:  12/19/23    Expected End:  01/02/24            Min assist sit/stand, bed/chair/commode with FWW.        Start:   12/19/23    Expected End:  01/02/24            Min assist toileting,        Start:  12/19/23    Expected End:  01/02/24            Min assist grooming.        Start:  12/19/23    Expected End:  01/02/24

## 2023-12-22 NOTE — CARE PLAN
The patient's goals for the shift include      The clinical goals for the shift include will remain afebrile    NO FEVER THRU THE END OF SHIFT.  Problem: Safety  Goal: Patient will be injury free during hospitalization  Outcome: Progressing     Problem: Safety  Goal: I will remain free of falls  Outcome: Progressing     Problem: Skin  Goal: Participates in plan/prevention/treatment measures  Outcome: Progressing     Problem: Skin  Goal: Prevent/minimize sheer/friction injuries  Outcome: Progressing     Problem: Skin  Goal: Promote/optimize nutrition  Outcome: Progressing     Problem: Skin  Goal: Participates in plan/prevention/treatment measures  Outcome: Progressing     Problem: Skin  Goal: Prevent/manage excess moisture  Outcome: Progressing        English

## 2023-12-22 NOTE — SIGNIFICANT EVENT
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  Patient has reported complete resolution of her symptoms including reorientation and medical capacity.  She remains hemodynamically stable.  Exam shows that patient has had improvement in orientation as she is now A/O x 3 to person place and time with appropriate responses to questions, heart is RRR no M/R/G, lung exam CTA without wheezes/rales/rhonchi, abdominal exam soft nontender, nondistended, and patient has right nasal packing.  At this time patient is medically stable for discharge to SNF and is awaiting transport that is likely to be tomorrow in the a.m.  Patient will receive her final dose of ceftriaxone tomorrow, and Drew has been contacted via  regarding her future follow-up with ENT for removal of nasal packing.

## 2023-12-23 VITALS
BODY MASS INDEX: 32.32 KG/M2 | HEIGHT: 67 IN | HEART RATE: 75 BPM | SYSTOLIC BLOOD PRESSURE: 157 MMHG | WEIGHT: 205.91 LBS | OXYGEN SATURATION: 92 % | DIASTOLIC BLOOD PRESSURE: 71 MMHG | TEMPERATURE: 97.3 F | RESPIRATION RATE: 18 BRPM

## 2023-12-23 LAB
GLUCOSE BLD MANUAL STRIP-MCNC: 101 MG/DL (ref 74–99)
GLUCOSE BLD MANUAL STRIP-MCNC: 159 MG/DL (ref 74–99)
GLUCOSE BLD MANUAL STRIP-MCNC: 240 MG/DL (ref 74–99)
GLUCOSE BLD MANUAL STRIP-MCNC: 83 MG/DL (ref 74–99)

## 2023-12-23 PROCEDURE — 2500000001 HC RX 250 WO HCPCS SELF ADMINISTERED DRUGS (ALT 637 FOR MEDICARE OP)

## 2023-12-23 PROCEDURE — 2500000004 HC RX 250 GENERAL PHARMACY W/ HCPCS (ALT 636 FOR OP/ED)

## 2023-12-23 PROCEDURE — 82947 ASSAY GLUCOSE BLOOD QUANT: CPT

## 2023-12-23 RX ADMIN — PANTOPRAZOLE SODIUM 40 MG: 40 TABLET, DELAYED RELEASE ORAL at 09:04

## 2023-12-23 RX ADMIN — ATORVASTATIN CALCIUM 40 MG: 40 TABLET, FILM COATED ORAL at 09:04

## 2023-12-23 RX ADMIN — MYCOPHENOLATE MOFETIL 1000 MG: 250 CAPSULE ORAL at 09:03

## 2023-12-23 RX ADMIN — PREDNISONE 10 MG: 10 TABLET ORAL at 09:05

## 2023-12-23 RX ADMIN — AMLODIPINE BESYLATE 2.5 MG: 2.5 TABLET ORAL at 09:05

## 2023-12-23 RX ADMIN — Medication 400 MG: at 09:04

## 2023-12-23 RX ADMIN — CEFTRIAXONE SODIUM 1 G: 1 INJECTION, SOLUTION INTRAVENOUS at 09:05

## 2023-12-23 ASSESSMENT — PAIN SCALES - GENERAL: PAINLEVEL_OUTOF10: 0 - NO PAIN

## 2023-12-23 NOTE — PROGRESS NOTES
Pt has a dc order. DSC tasked to send GF and [id number]. Facility updated. Awaiting confirmation of time pt can admit.    0945 Pt has a 1pm  for transport to [facility]. Nursing aware and to notify family if needed.

## 2023-12-23 NOTE — SIGNIFICANT EVENT
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 SCOTTIE, continues to endorse that she feels well and is ready for discharge to [facility]. She remains hemodynamically stable.  Exam significant for A/O x 3 to person place and time with appropriate responses to questions, heart is RRR no M/R/G, lung exam CTA without wheezes/rales/rhonchi, abdominal exam soft nontender, nondistended, and patient has right nasal packing. Received final dose of ceftriaxone today. She is medically stable for discharge and will be discharged to [facility] at 1 pm per  note.    Mynor Jennings D.O.  PGY-1 Internal medicine resident

## 2023-12-23 NOTE — CARE PLAN
The patient's goals for the shift include      The clinical goals for the shift include will be free of epistaxis    NO FURTHER EPISODE OF EPISTAXIS NOTED.

## 2023-12-23 NOTE — CARE PLAN
The patient's goals for the shift include remain fall free.    The clinical goals for the shift include remain afebrile

## 2023-12-26 ENCOUNTER — OFFICE VISIT (OUTPATIENT)
Dept: OTOLARYNGOLOGY | Facility: CLINIC | Age: 63
End: 2023-12-26
Payer: MEDICARE

## 2023-12-26 VITALS
WEIGHT: 205 LBS | HEIGHT: 67 IN | BODY MASS INDEX: 32.18 KG/M2 | SYSTOLIC BLOOD PRESSURE: 123 MMHG | DIASTOLIC BLOOD PRESSURE: 77 MMHG | TEMPERATURE: 97.2 F

## 2023-12-26 DIAGNOSIS — R04.0 POSTERIOR EPISTAXIS: Primary | ICD-10-CM

## 2023-12-26 PROCEDURE — 3008F BODY MASS INDEX DOCD: CPT | Performed by: OTOLARYNGOLOGY

## 2023-12-26 PROCEDURE — 3066F NEPHROPATHY DOC TX: CPT | Performed by: OTOLARYNGOLOGY

## 2023-12-26 PROCEDURE — 1036F TOBACCO NON-USER: CPT | Performed by: OTOLARYNGOLOGY

## 2023-12-26 PROCEDURE — 3074F SYST BP LT 130 MM HG: CPT | Performed by: OTOLARYNGOLOGY

## 2023-12-26 PROCEDURE — 99213 OFFICE O/P EST LOW 20 MIN: CPT | Performed by: OTOLARYNGOLOGY

## 2023-12-26 PROCEDURE — 3044F HG A1C LEVEL LT 7.0%: CPT | Performed by: OTOLARYNGOLOGY

## 2023-12-26 PROCEDURE — 3078F DIAST BP <80 MM HG: CPT | Performed by: OTOLARYNGOLOGY

## 2023-12-26 NOTE — PROGRESS NOTES
Impression:  1. Posterior epistaxis              RECOMMENDATIONS/PLAN :  After removing her posterior nasal packing/Merocel sponge from the right nostril, the patient was breathing much better today.  I reassured her there is no evidence of any further bleeding today.  I want her to use saline spray in the nose to keep everything moist and she can restart her Eliquis starting tomorrow.  I gave her an instruction sheet on how to control minor bleeding with cotton balls and Afrin nasal spray.  She will call if she has any other problems and otherwise follow-up as needed.      **This electronic medical record note was created with the use of voice recognition software.  Despite proofreading, typographical or grammatical errors may be present that could affect meaning of content **    Subjective   Patient ID:     Bing Holliday is a 63 y.o. female who presents to the office today after she had severe bleeding from her right nostril.  She was admitted to the hospital and her internal medicine doctor had placed a 10 cm nasal Merocel sponge in the right nostril.  It has been in place for the last 7 days.  She has been taking oral antibiotics during that time.  She denies any further active bleeding.  No problems with the left nostril.  Her Eliquis was held and she has been doing much better.  No other complaints today.    ROS:  A detailed 12 system review of systems is noted on the intake form has been reviewed with the patient with details noted in the HPI and scanned into the patient's medical record.    Objective     Past Medical History:   Diagnosis Date    Acute upper respiratory infection, unspecified 03/04/2020    Acute URI    Acute upper respiratory infection, unspecified 09/30/2015    URTI (acute upper respiratory infection)    Arthritis     Body mass index (BMI) 23.0-23.9, adult 10/15/2021    BMI 23.0-23.9, adult    Body mass index (BMI) 33.0-33.9, adult 03/04/2020    BMI 33.0-33.9,adult    Cardiomegaly  08/27/2013    Left ventricular hypertrophy    Chronic kidney disease, stage 3 unspecified (CMS/HCC) 07/02/2013    Chronic kidney disease, stage III (moderate)    Disease of pericardium, unspecified 07/02/2013    Pericardial disease    Encounter for follow-up examination after completed treatment for conditions other than malignant neoplasm 10/06/2022    Hospital discharge follow-up    Generalized contraction of visual field, right eye 01/29/2015    Generalized contraction of visual field of right eye    Homonymous bilateral field defects, right side 04/29/2016    Homonymous bilateral field defects of right side    Hypertensive chronic kidney disease with stage 1 through stage 4 chronic kidney disease, or unspecified chronic kidney disease 07/02/2013    Nephrosclerosis    Laceration without foreign body, left foot, initial encounter 07/03/2018    Foot laceration, left, initial encounter    Migraine with aura, not intractable, without status migrainosus 10/24/2022    Ocular migraine    Other conditions influencing health status 07/02/2013    Chronic Glomerulonephritis In Diseases Classified Elsewhere    Other conditions influencing health status 07/02/2013    Progressive Familial Myoclonic Epilepsy    Other conditions influencing health status 07/02/2013    Protein S Deficiency    Other conditions influencing health status 05/22/2015    Familial Combined Hyperlipidemia    Other conditions influencing health status 10/24/2022    IDDM (insulin dependent diabetes mellitus)    Other conditions influencing health status 03/14/2022    Diabetes mellitus, insulin dependent (IDDM), uncontrolled    Other long term (current) drug therapy 10/24/2022    Long-term use of Plaquenil    Personal history of diseases of the blood and blood-forming organs and certain disorders involving the immune mechanism 07/02/2013    History of thrombocytopenia    Personal history of diseases of the skin and subcutaneous tissue 08/11/2015    History  of foot ulcer    Personal history of nephrotic syndrome 07/02/2013    History of nephrotic syndrome    Personal history of other diseases of the circulatory system 08/27/2013    History of sinus tachycardia    Personal history of other diseases of the nervous system and sense organs     History of cataract    Personal history of other diseases of the respiratory system     History of bronchitis    Personal history of other infectious and parasitic diseases 07/02/2013    History of hepatitis    Personal history of other specified conditions     History of shortness of breath    Personal history of other specified conditions 08/27/2013    History of edema    Puckering of macula, right eye 10/24/2022    ERM OD (epiretinal membrane, right eye)    Raynaud's syndrome without gangrene 07/02/2013    Raynaud's disease    Systemic lupus erythematosus, unspecified (CMS/HCC) 07/24/2015    SLE (systemic lupus erythematosus)    Systemic lupus erythematosus, unspecified (CMS/HCC) 07/24/2015    SLE (systemic lupus erythematosus)    Systemic lupus erythematosus, unspecified (CMS/HCC) 07/24/2015    Systemic lupus    Type 2 diabetes mellitus with diabetic nephropathy (CMS/HCC) 07/02/2013    Type 2 diabetes with nephropathy    Type 2 diabetes mellitus with mild nonproliferative diabetic retinopathy without macular edema, left eye (CMS/HCC) 07/27/2015    Non-proliferative diabetic retinopathy, left eye    Type 2 diabetes mellitus with mild nonproliferative diabetic retinopathy without macular edema, unspecified eye (CMS/HCC) 07/24/2015    Mild non proliferative diabetic retinopathy    Type 2 diabetes mellitus with proliferative diabetic retinopathy without macular edema, right eye (CMS/HCC) 07/27/2015    Proliferative diabetic retinopathy of right eye    Type 2 diabetes mellitus with proliferative diabetic retinopathy without macular edema, unspecified eye (CMS/HCC) 07/24/2015    Diabetic proliferative retinopathy    Unspecified acute  "and subacute iridocyclitis 07/24/2015    Acute iritis, right eye    Unspecified open wound, left foot, sequela 07/03/2018    Wound, open, foot, left, sequela        Past Surgical History:   Procedure Laterality Date    ANKLE SURGERY  01/29/2015    Ankle Surgery    CHOLECYSTECTOMY  01/29/2015    Cholecystectomy    CT GUIDED PERCUTANEOUS BIOPSY BONE DEEP  5/4/2021    CT GUIDED PERCUTANEOUS BIOPSY BONE DEEP 5/4/2021 [facility]GAC[facility]    EYE SURGERY  03/06/2015    Eye Surgery    FOOT SURGERY  01/29/2015    Foot Surgery    MR HEAD ANGIO WO IV CONTRAST  7/26/2013    MR HEAD ANGIO WO IV CONTRAST 7/26/2013 [facility] CLINICAL LEGAC[facility]    MR HEAD ANGIO WO IV CONTRAST  9/17/2021    MR HEAD ANGIO WO IV CONTRAST 9/17/2021 [facility]    MR HEAD ANGIO WO IV CONTRAST  3/25/2023    MR HEAD ANGIO WO IV CONTRAST [facility]    MR NECK ANGIO WO IV CONTRAST  7/26/2013    MR NECK ANGIO WO IV CONTRAST 7/26/2013 [facility]ICAL [facility]GAC[facility]    MR NECK ANGIO WO IV CONTRAST  9/17/2021    MR NECK ANGIO WO IV CONTRAST 9/17/2021 [facility]    MR NECK ANGIO WO IV CONTRAST  3/25/2023    MR NECK ANGIO WO IV CONTRAST [facility]    OTHER SURGICAL HISTORY  01/29/2015    Creation Of Pericardial Window    OTHER SURGICAL HISTORY  01/29/2015    Quadricepsplasty    TOTAL HIP ARTHROPLASTY  01/29/2015    Hip Replacement        Allergies   Allergen Reactions    Ace Inhibitors Other, Angioedema and Swelling     \"FACIAL TWISTING\"    'FACIAL TWISTING\" \"LOOKS LIKE I HAD A STROKE IN MY SLEEP\"    Hydroxychloroquine Unknown     RETINAL BLEEDING    RETINAL BLEEDING, Eye problems    Lisinopril Swelling     PT. CLAIMS SWOLLEN FACE    Penicillins Unknown     tolerates cephalosporins    From Childhood    Mbr sts that she was told since young that she is allergic    Sulfa (Sulfonamide Antibiotics) Hives          Current Outpatient Medications:     amLODIPine (Norvasc) 2.5 mg tablet, Take 1 tablet (2.5 mg) by mouth once daily., Disp: , Rfl:     atorvastatin (Lipitor) 40 " mg tablet, Take 1 tablet (40 mg) by mouth once daily., Disp: 90 tablet, Rfl: 3    blood-glucose sensor (Dexcom G6 Sensor) device, Use to check sugars 3 times daily, Disp: 4 each, Rfl: 2    Ca carb-D3-mag tu-ynt-wqdt-Zn 300 mg-20 mcg- 25 mg-0.5 mg tablet, Take 1 tablet by mouth 2 times a day., Disp: , Rfl:     Dexcom G4 platinum  (Dexcom G6 ) misc, Use as instructed, Disp: 1 each, Rfl: 0    Dexcom G4 platinum transmitter (Dexcom G6 Transmitter) device, Use as instructed, Disp: 1 each, Rfl: 0    fluticasone-umeclidin-vilanter (TRELEGY-ELLIPTA) 200-62.5-25 mcg blister with device, Inhale 1 puff once daily., Disp: 60 each, Rfl: 5    folic acid (Folvite) 1 mg tablet, TAKE 1 TABLET BY MOUTH EVERY DAY, Disp: 90 tablet, Rfl: 1    magnesium oxide (Mag-Ox) 400 mg tablet, Take 1 tablet (400 mg) by mouth once daily., Disp: , Rfl:     melatonin 5 mg tablet, Take 1 tablet (5 mg) by mouth once daily at bedtime., Disp: , Rfl:     multivitamin tablet, Take 1 tablet by mouth once daily., Disp: , Rfl:     mycophenolate (Cellcept) 500 mg tablet, Take 2 tablets (1,000 mg) by mouth twice a day., Disp: , Rfl:     pantoprazole (ProtoNix) 40 mg EC tablet, TAKE 1 TABLET BY MOUTH EVERY DAY, Disp: 90 tablet, Rfl: 1    predniSONE (Deltasone) 10 mg tablet, Take 1 tablet (10 mg) by mouth once daily in the morning. Take first thing when you wake up every morning., Disp: 30 tablet, Rfl: 2    predniSONE (Deltasone) 5 mg tablet, Take 1 tablet (5 mg) by mouth see administration instructions. Take one tablet everyday at 2PM scheduled, Disp: 30 tablet, Rfl: 2    risperiDONE (RisperDAL) 0.5 mg tablet, TAKE 1 TABLET BY MOUTH AT BEDTIME, Disp: 30 tablet, Rfl: 0    torsemide (Demadex) 10 mg tablet, TAKE 1 TABLET BY MOUTH EVERY DAY, Disp: 90 tablet, Rfl: 0    acetaminophen (Tylenol) 325 mg tablet, Take 2 tablets (650 mg) by mouth every 4 hours if needed for mild pain (1 - 3)., Disp: , Rfl:     belimumab (Benlysta) 400 mg recon soln IV  "injection, Infuse 10 mg/kg into a venous catheter every 28 (twenty-eight) days.  (900mg per dose), Disp: , Rfl:     HYDROcodone-acetaminophen (Norco) 5-325 mg tablet, Take 1 tablet by mouth 2 times a day as needed for severe pain (7 - 10)., Disp: 60 tablet, Rfl: 0     Tobacco Use: Low Risk  (12/26/2023)    Patient History     Smoking Tobacco Use: Never     Smokeless Tobacco Use: Never     Passive Exposure: Not on file        Alcohol Use: Patient Declined (12/16/2023)    AUDIT-C     Frequency of Alcohol Consumption: Patient declined     Average Number of Drinks: Patient declined     Frequency of Binge Drinking: Patient declined        Social History     Substance and Sexual Activity   Drug Use Never        Physical Exam:  Visit Vitals  /77   Temp 36.2 °C (97.2 °F) (Temporal)   Ht 1.702 m (5' 7\")   Wt 93 kg (205 lb)   LMP  (LMP Unknown)   BMI 32.11 kg/m²   OB Status Postmenopausal   Smoking Status Never   BSA 2.1 m²      General: Patient is alert, oriented, cooperative in no apparent distress.  Head: Normocephalic, atraumatic.  Eyes: PERRL, EOMI, Conjunctiva is clear. No nystagmus.  Ears: Right Ear-- Pinna is normal.  External auditory canal is patent. Tympanic membrane is [intact, translucent and has good mobility with my pneumatic otoscope. No effusion].  Mastoid is nontender.  Left ear-- Pinna is normal.  External auditory canal is patent. Tympanic membrane is [intact, translucent and has good mobility with my pneumatic otoscope.  No effusion].  Mastoid is nontender.  Nose: After removing her 10 cm nasal Merosel sponge from the right nostril, there is no evidence of any further bleeding.  Anterior septum is clear.  Mucosa is mildly dry.  Left side clear.  No clots.  No septal perforation or lesions. No septal hematoma/ seroma.   Inferior turbinates are normal.   No evidence of intranasal polyps.  No infectious drainage.  Throat:  Floor of mouth is clear, no masses.  Tongue appears normal, no lesions or " masses. Gums, gingiva, buccal mucosa appear pink and moist, no lesions. Teeth are in good repair.  No obvious dental infections.  Peritonsillar regions appear symmetric without swelling.  Hard and soft palate appear normal, no obvious cleft. Uvula is midline.  Oropharynx: No lesions. Retropharyngeal wall is flat.  No active postnasal drip.  Neck: Supple,  no lymphadenopathy.  No masses.  Salivary Glands: Symmetric bilaterally.  No palpable masses.  No evidence of acute infection or salivary stones  Neurologic: Cranial Nerves 2-12 are grossly intact without focal deficits. Cerebellar function testing is normal.     Results:   []    Procedure:   []    Evaristo Rolle, DO

## 2023-12-30 ENCOUNTER — NURSING HOME VISIT (OUTPATIENT)
Dept: POST ACUTE CARE | Facility: EXTERNAL LOCATION | Age: 63
End: 2023-12-30
Payer: MEDICARE

## 2023-12-30 DIAGNOSIS — E44.0 MODERATE PROTEIN-CALORIE MALNUTRITION (MULTI): ICD-10-CM

## 2023-12-30 DIAGNOSIS — I10 BENIGN ESSENTIAL HTN: Primary | Chronic | ICD-10-CM

## 2023-12-30 DIAGNOSIS — I48.91 ATRIAL FIBRILLATION, UNSPECIFIED TYPE (MULTI): Chronic | ICD-10-CM

## 2023-12-30 DIAGNOSIS — N30.00 ACUTE CYSTITIS WITHOUT HEMATURIA: ICD-10-CM

## 2023-12-30 DIAGNOSIS — R26.2 AMBULATORY DYSFUNCTION: ICD-10-CM

## 2023-12-30 DIAGNOSIS — E11.42 DM TYPE 2 WITH DIABETIC PERIPHERAL NEUROPATHY (MULTI): Chronic | ICD-10-CM

## 2023-12-30 DIAGNOSIS — R44.3 HALLUCINATIONS: ICD-10-CM

## 2023-12-30 PROCEDURE — 99305 1ST NF CARE MODERATE MDM 35: CPT | Performed by: STUDENT IN AN ORGANIZED HEALTH CARE EDUCATION/TRAINING PROGRAM

## 2023-12-30 NOTE — LETTER
Patient: Bing Holliday  : 1960    Encounter Date: 2023    Subjective  Patient ID: Bing Holliday is a 63 y.o. female.    63-year-old female with past medical history of diabetes, CKD, lupus, paroxysmal A-fib, hypertension who presents to the emergency room with low blood sugar and initial hyperkalemia.  Patient was also found to have a UTI, and was admitted for further management.  She received IV antibiotics for UTI and finished course of antibiotics while in the hospital and awaiting discharge to facility.  Patient's potassium resolved with Kayexalate and calcium gluconate.  Mental status overall improved, and patient was medically cleared for discharge.  On evaluation SNF, patient feels like she is overall improving.  States no acute symptoms or concerns.  Has been having some diarrhea as of late, otherwise states no additional issues or concerns.    Review of Systems   Constitutional: Negative.    HENT: Negative.     Respiratory: Negative.     Cardiovascular: Negative.    Gastrointestinal:  Positive for abdominal pain and diarrhea.   Musculoskeletal: Negative.    Neurological: Negative.    Psychiatric/Behavioral: Negative.         ObjectiveVitals Reviewed via facility EMR   Physical Exam  Constitutional:       General: She is not in acute distress.     Appearance: She is not ill-appearing.   Eyes:      Pupils: Pupils are equal, round, and reactive to light.   Cardiovascular:      Rate and Rhythm: Normal rate and regular rhythm.      Pulses: Normal pulses.      Heart sounds: No murmur heard.  Pulmonary:      Effort: No respiratory distress.      Breath sounds: No wheezing.   Abdominal:      General: Abdomen is flat. Bowel sounds are normal. There is no distension.   Musculoskeletal:      Right lower leg: No edema.      Left lower leg: No edema.   Skin:     General: Skin is warm and dry.   Neurological:      Mental Status: She is alert. Mental status is at baseline.      Cranial Nerves: No  cranial nerve deficit.      Motor: No weakness.   Psychiatric:         Mood and Affect: Mood normal.         Behavior: Behavior normal.         Assessment/Plan  Diagnoses and all orders for this visit:  Benign essential HTN  Atrial fibrillation, unspecified type (CMS/HCC)  Moderate protein-calorie malnutrition (CMS/HCC)  DM type 2 with diabetic peripheral neuropathy (CMS/HCC)  Acute cystitis without hematuria  Hallucinations  Ambulatory dysfunction      Patient seen and examined at bedside.  Hospital course reviewed.  Continue supportive care closely monitor electrolytes.  Does frequently have issues with diarrhea, will initiate Imodium.  If continues to be persistent will recommend Lomotil.  Continue to monitor labs.  Continue supportive care PT OT.    Ayaan Koehler DO       Electronically Signed By: Ayaan Koehler DO   1/2/24  9:10 PM

## 2023-12-31 DIAGNOSIS — D64.9 ANEMIA, UNSPECIFIED: ICD-10-CM

## 2024-01-02 ENCOUNTER — NURSING HOME VISIT (OUTPATIENT)
Dept: POST ACUTE CARE | Facility: EXTERNAL LOCATION | Age: 64
End: 2024-01-02
Payer: MEDICARE

## 2024-01-02 DIAGNOSIS — R53.82 CHRONIC FATIGUE: Primary | ICD-10-CM

## 2024-01-02 DIAGNOSIS — K59.1 FUNCTIONAL DIARRHEA: ICD-10-CM

## 2024-01-02 DIAGNOSIS — R26.2 AMBULATORY DYSFUNCTION: ICD-10-CM

## 2024-01-02 PROCEDURE — 99308 SBSQ NF CARE LOW MDM 20: CPT | Performed by: STUDENT IN AN ORGANIZED HEALTH CARE EDUCATION/TRAINING PROGRAM

## 2024-01-02 ASSESSMENT — ENCOUNTER SYMPTOMS
NAUSEA: 1
CONSTITUTIONAL NEGATIVE: 1
CARDIOVASCULAR NEGATIVE: 1
DIARRHEA: 1
PSYCHIATRIC NEGATIVE: 1
NEUROLOGICAL NEGATIVE: 1
CONSTITUTIONAL NEGATIVE: 1
PSYCHIATRIC NEGATIVE: 1
NEUROLOGICAL NEGATIVE: 1
CARDIOVASCULAR NEGATIVE: 1
MUSCULOSKELETAL NEGATIVE: 1
RESPIRATORY NEGATIVE: 1
ABDOMINAL PAIN: 1
DIARRHEA: 1
RESPIRATORY NEGATIVE: 1
MUSCULOSKELETAL NEGATIVE: 1

## 2024-01-02 NOTE — LETTER
Patient: Bing Holliday  : 1960    Encounter Date: 2024    Subjective  Patient ID: Bing Holliday is a 63 y.o. female.    Patient seen and examined at bedside.  States that she is overall doing well, however continues to still have diarrhea.  Has slightly improved with use of Imodium but is still present.  In addition, labs did show slight hyperkalemia as well.  No additional issues at this time.        Review of Systems   Constitutional: Negative.    HENT: Negative.     Respiratory: Negative.     Cardiovascular: Negative.    Gastrointestinal:  Positive for diarrhea and nausea.   Musculoskeletal: Negative.    Neurological: Negative.    Psychiatric/Behavioral: Negative.         ObjectiveVitals Reviewed via facility EMR   Physical Exam  Constitutional:       General: She is not in acute distress.     Appearance: She is not ill-appearing.   Eyes:      Pupils: Pupils are equal, round, and reactive to light.   Cardiovascular:      Rate and Rhythm: Normal rate and regular rhythm.      Pulses: Normal pulses.      Heart sounds: No murmur heard.  Pulmonary:      Effort: No respiratory distress.      Breath sounds: No wheezing.   Abdominal:      General: Abdomen is flat. Bowel sounds are normal. There is no distension.   Musculoskeletal:      Right lower leg: No edema.      Left lower leg: No edema.   Skin:     General: Skin is warm and dry.   Neurological:      Mental Status: She is alert. Mental status is at baseline.      Cranial Nerves: No cranial nerve deficit.      Motor: No weakness.   Psychiatric:         Mood and Affect: Mood normal.         Behavior: Behavior normal.         Assessment/Plan  Diagnoses and all orders for this visit:  Chronic fatigue  Ambulatory dysfunction  Functional diarrhea    Patient seen and examined at bedside.  Will initiate Lomotil due to frequency of diarrhea.  This is likely medication related, noted to have slight hyperkalemia as well, will repeat BMP as well.  Give 1 dose  of Kayexalate.  Continue supportive care.    Ayaan Koehler DO       Electronically Signed By: Ayaan Koehler DO   1/2/24  9:13 PM

## 2024-01-03 ENCOUNTER — NURSING HOME VISIT (OUTPATIENT)
Dept: POST ACUTE CARE | Facility: EXTERNAL LOCATION | Age: 64
End: 2024-01-03
Payer: MEDICARE

## 2024-01-03 DIAGNOSIS — E11.42 DM TYPE 2 WITH DIABETIC PERIPHERAL NEUROPATHY (MULTI): Chronic | ICD-10-CM

## 2024-01-03 DIAGNOSIS — I10 BENIGN ESSENTIAL HTN: Primary | Chronic | ICD-10-CM

## 2024-01-03 DIAGNOSIS — N18.31 CKD STAGE G3A/A2, GFR 45-59 AND ALBUMIN CREATININE RATIO 30-299 MG/G (MULTI): Chronic | ICD-10-CM

## 2024-01-03 DIAGNOSIS — K52.9 CHRONIC DIARRHEA: ICD-10-CM

## 2024-01-03 DIAGNOSIS — R73.9 HYPERGLYCEMIA: ICD-10-CM

## 2024-01-03 PROCEDURE — 99308 SBSQ NF CARE LOW MDM 20: CPT | Performed by: STUDENT IN AN ORGANIZED HEALTH CARE EDUCATION/TRAINING PROGRAM

## 2024-01-03 RX ORDER — FOLIC ACID 1 MG/1
TABLET ORAL
Qty: 90 TABLET | Refills: 1 | Status: ON HOLD | OUTPATIENT
Start: 2024-01-03

## 2024-01-03 NOTE — PROGRESS NOTES
Subjective   Patient ID: Bing Holliday is a 63 y.o. female.    63-year-old female with past medical history of diabetes, CKD, lupus, paroxysmal A-fib, hypertension who presents to the emergency room with low blood sugar and initial hyperkalemia.  Patient was also found to have a UTI, and was admitted for further management.  She received IV antibiotics for UTI and finished course of antibiotics while in the hospital and awaiting discharge to facility.  Patient's potassium resolved with Kayexalate and calcium gluconate.  Mental status overall improved, and patient was medically cleared for discharge.  On evaluation SNF, patient feels like she is overall improving.  States no acute symptoms or concerns.  Has been having some diarrhea as of late, otherwise states no additional issues or concerns.    Review of Systems   Constitutional: Negative.    HENT: Negative.     Respiratory: Negative.     Cardiovascular: Negative.    Gastrointestinal:  Positive for abdominal pain and diarrhea.   Musculoskeletal: Negative.    Neurological: Negative.    Psychiatric/Behavioral: Negative.         Objective Vitals Reviewed via facility EMR   Physical Exam  Constitutional:       General: She is not in acute distress.     Appearance: She is not ill-appearing.   Eyes:      Pupils: Pupils are equal, round, and reactive to light.   Cardiovascular:      Rate and Rhythm: Normal rate and regular rhythm.      Pulses: Normal pulses.      Heart sounds: No murmur heard.  Pulmonary:      Effort: No respiratory distress.      Breath sounds: No wheezing.   Abdominal:      General: Abdomen is flat. Bowel sounds are normal. There is no distension.   Musculoskeletal:      Right lower leg: No edema.      Left lower leg: No edema.   Skin:     General: Skin is warm and dry.   Neurological:      Mental Status: She is alert. Mental status is at baseline.      Cranial Nerves: No cranial nerve deficit.      Motor: No weakness.   Psychiatric:         Mood  and Affect: Mood normal.         Behavior: Behavior normal.         Assessment/Plan   Diagnoses and all orders for this visit:  Benign essential HTN  Atrial fibrillation, unspecified type (CMS/HCC)  Moderate protein-calorie malnutrition (CMS/HCC)  DM type 2 with diabetic peripheral neuropathy (CMS/HCC)  Acute cystitis without hematuria  Hallucinations  Ambulatory dysfunction      Patient seen and examined at bedside.  Hospital course reviewed.  Continue supportive care closely monitor electrolytes.  Does frequently have issues with diarrhea, will initiate Imodium.  If continues to be persistent will recommend Lomotil.  Continue to monitor labs.  Continue supportive care PT OT.    Ayaan Koehler DO

## 2024-01-03 NOTE — LETTER
Patient: Bing Holliday  : 1960    Encounter Date: 2024    Subjective  Patient ID: Bing Holliday is a 63 y.o. female.    Patient seen and examined at bedside.  She states that she is overall feeling well with no concerns.  Recently, her diarrhea has stopped and she feels overall well secondary to this.  In addition, has started noticing some worsening lower extremity swelling as well.  No additional issues.        Review of Systems   Constitutional: Negative.    HENT: Negative.     Respiratory: Negative.     Cardiovascular: Negative.    Gastrointestinal: Negative.         Improved diarrhea   Musculoskeletal: Negative.    Neurological: Negative.    Psychiatric/Behavioral: Negative.         ObjectiveVitals Reviewed via facility EMR   Physical Exam  Constitutional:       General: She is not in acute distress.     Appearance: She is not ill-appearing.   Eyes:      Pupils: Pupils are equal, round, and reactive to light.   Cardiovascular:      Rate and Rhythm: Normal rate and regular rhythm.      Pulses: Normal pulses.      Heart sounds: No murmur heard.  Pulmonary:      Effort: No respiratory distress.      Breath sounds: No wheezing.   Abdominal:      General: Abdomen is flat. Bowel sounds are normal. There is no distension.   Musculoskeletal:      Right lower leg: No edema.      Left lower leg: No edema.   Skin:     General: Skin is warm and dry.   Neurological:      Mental Status: She is alert. Mental status is at baseline.      Cranial Nerves: No cranial nerve deficit.      Motor: No weakness.   Psychiatric:         Mood and Affect: Mood normal.         Behavior: Behavior normal.         Assessment/Plan  Diagnoses and all orders for this visit:  Benign essential HTN  DM type 2 with diabetic peripheral neuropathy (CMS/[facility])  CKD stage G3a/A2, GFR 45-59 and albumin creatinine ratio  mg/g (CMS/[facility])  Chronic diarrhea  Hyperglycemia  Patient seen and examined, noted to have bilateral lower extremity  edema.  Will increase torsemide briefly.  Encouraged low-salt diet.  Continue with as needed bowel regimen.  Labs overall stable.  No additional issues at this time.  Post visit  Patient noted to have a blood sugar greater than 400.  Likely secondary to steroids however we will closely monitor for signs and symptoms of underlying infection.  Continue supportive care.  Closely monitor blood sugars.    yAaan Koehler DO         Electronically Signed By: Ayaan Koehler DO   1/6/24  1:37 PM

## 2024-01-03 NOTE — PROGRESS NOTES
Subjective   Patient ID: Bing Holliday is a 63 y.o. female.    Patient seen and examined at bedside.  States that she is overall doing well, however continues to still have diarrhea.  Has slightly improved with use of Imodium but is still present.  In addition, labs did show slight hyperkalemia as well.  No additional issues at this time.        Review of Systems   Constitutional: Negative.    HENT: Negative.     Respiratory: Negative.     Cardiovascular: Negative.    Gastrointestinal:  Positive for diarrhea and nausea.   Musculoskeletal: Negative.    Neurological: Negative.    Psychiatric/Behavioral: Negative.         Objective Vitals Reviewed via facility EMR   Physical Exam  Constitutional:       General: She is not in acute distress.     Appearance: She is not ill-appearing.   Eyes:      Pupils: Pupils are equal, round, and reactive to light.   Cardiovascular:      Rate and Rhythm: Normal rate and regular rhythm.      Pulses: Normal pulses.      Heart sounds: No murmur heard.  Pulmonary:      Effort: No respiratory distress.      Breath sounds: No wheezing.   Abdominal:      General: Abdomen is flat. Bowel sounds are normal. There is no distension.   Musculoskeletal:      Right lower leg: No edema.      Left lower leg: No edema.   Skin:     General: Skin is warm and dry.   Neurological:      Mental Status: She is alert. Mental status is at baseline.      Cranial Nerves: No cranial nerve deficit.      Motor: No weakness.   Psychiatric:         Mood and Affect: Mood normal.         Behavior: Behavior normal.         Assessment/Plan   Diagnoses and all orders for this visit:  Chronic fatigue  Ambulatory dysfunction  Functional diarrhea    Patient seen and examined at bedside.  Will initiate Lomotil due to frequency of diarrhea.  This is likely medication related, noted to have slight hyperkalemia as well, will repeat BMP as well.  Give 1 dose of Kayexalate.  Continue supportive care.    Ayaan Koehler DO

## 2024-01-04 ENCOUNTER — APPOINTMENT (OUTPATIENT)
Dept: INFUSION THERAPY | Facility: CLINIC | Age: 64
End: 2024-01-04
Payer: MEDICARE

## 2024-01-05 DIAGNOSIS — M32.9 SYSTEMIC LUPUS ERYTHEMATOSUS, UNSPECIFIED (MULTI): ICD-10-CM

## 2024-01-05 RX ORDER — MYCOPHENOLATE MOFETIL 500 MG/1
1000 TABLET ORAL 2 TIMES DAILY
Qty: 360 TABLET | OUTPATIENT
Start: 2024-01-05

## 2024-01-06 ASSESSMENT — ENCOUNTER SYMPTOMS
MUSCULOSKELETAL NEGATIVE: 1
RESPIRATORY NEGATIVE: 1
NEUROLOGICAL NEGATIVE: 1
CONSTITUTIONAL NEGATIVE: 1
PSYCHIATRIC NEGATIVE: 1
GASTROINTESTINAL NEGATIVE: 1
CARDIOVASCULAR NEGATIVE: 1

## 2024-01-06 NOTE — PROGRESS NOTES
Subjective   Patient ID: Bing Holliday is a 63 y.o. female.    Patient seen and examined at bedside.  She states that she is overall feeling well with no concerns.  Recently, her diarrhea has stopped and she feels overall well secondary to this.  In addition, has started noticing some worsening lower extremity swelling as well.  No additional issues.        Review of Systems   Constitutional: Negative.    HENT: Negative.     Respiratory: Negative.     Cardiovascular: Negative.    Gastrointestinal: Negative.         Improved diarrhea   Musculoskeletal: Negative.    Neurological: Negative.    Psychiatric/Behavioral: Negative.         Objective Vitals Reviewed via facility EMR   Physical Exam  Constitutional:       General: She is not in acute distress.     Appearance: She is not ill-appearing.   Eyes:      Pupils: Pupils are equal, round, and reactive to light.   Cardiovascular:      Rate and Rhythm: Normal rate and regular rhythm.      Pulses: Normal pulses.      Heart sounds: No murmur heard.  Pulmonary:      Effort: No respiratory distress.      Breath sounds: No wheezing.   Abdominal:      General: Abdomen is flat. Bowel sounds are normal. There is no distension.   Musculoskeletal:      Right lower leg: No edema.      Left lower leg: No edema.   Skin:     General: Skin is warm and dry.   Neurological:      Mental Status: She is alert. Mental status is at baseline.      Cranial Nerves: No cranial nerve deficit.      Motor: No weakness.   Psychiatric:         Mood and Affect: Mood normal.         Behavior: Behavior normal.         Assessment/Plan   Diagnoses and all orders for this visit:  Benign essential HTN  DM type 2 with diabetic peripheral neuropathy (CMS/[facility])  CKD stage G3a/A2, GFR 45-59 and albumin creatinine ratio  mg/g (CMS/[facility])  Chronic diarrhea  Hyperglycemia  Patient seen and examined, noted to have bilateral lower extremity edema.  Will increase torsemide briefly.  Encouraged low-salt diet.   Continue with as needed bowel regimen.  Labs overall stable.  No additional issues at this time.  Post visit  Patient noted to have a blood sugar greater than 400.  Likely secondary to steroids however we will closely monitor for signs and symptoms of underlying infection.  Continue supportive care.  Closely monitor blood sugars.    Ayaan Koehler DO

## 2024-01-08 ENCOUNTER — NURSING HOME VISIT (OUTPATIENT)
Dept: PRIMARY CARE | Facility: CLINIC | Age: 64
End: 2024-01-08

## 2024-01-08 ENCOUNTER — TELEMEDICINE (OUTPATIENT)
Dept: RHEUMATOLOGY | Facility: CLINIC | Age: 64
End: 2024-01-08
Payer: MEDICARE

## 2024-01-08 ENCOUNTER — APPOINTMENT (OUTPATIENT)
Dept: RHEUMATOLOGY | Facility: CLINIC | Age: 64
End: 2024-01-08
Payer: MEDICARE

## 2024-01-08 DIAGNOSIS — M32.9 LUPUS (MULTI): Chronic | ICD-10-CM

## 2024-01-08 DIAGNOSIS — R29.898 WEAKNESS OF BOTH LOWER EXTREMITIES: ICD-10-CM

## 2024-01-08 DIAGNOSIS — I10 BENIGN ESSENTIAL HTN: Primary | Chronic | ICD-10-CM

## 2024-01-08 DIAGNOSIS — N18.31 CKD STAGE G3A/A2, GFR 45-59 AND ALBUMIN CREATININE RATIO 30-299 MG/G (MULTI): Chronic | ICD-10-CM

## 2024-01-08 DIAGNOSIS — M32.9 SYSTEMIC LUPUS ERYTHEMATOSUS, UNSPECIFIED SLE TYPE, UNSPECIFIED ORGAN INVOLVEMENT STATUS (MULTI): Primary | Chronic | ICD-10-CM

## 2024-01-08 DIAGNOSIS — E11.42 DM TYPE 2 WITH DIABETIC PERIPHERAL NEUROPATHY (MULTI): Chronic | ICD-10-CM

## 2024-01-08 DIAGNOSIS — M81.0 OSTEOPOROSIS, UNSPECIFIED OSTEOPOROSIS TYPE, UNSPECIFIED PATHOLOGICAL FRACTURE PRESENCE: ICD-10-CM

## 2024-01-08 DIAGNOSIS — M79.89 SWELLING OF RIGHT FOOT: ICD-10-CM

## 2024-01-08 DIAGNOSIS — N17.9 AKI (ACUTE KIDNEY INJURY) (CMS-HCC): ICD-10-CM

## 2024-01-08 PROCEDURE — RXMED WILLOW AMBULATORY MEDICATION CHARGE

## 2024-01-08 PROCEDURE — 99309 SBSQ NF CARE MODERATE MDM 30: CPT | Performed by: STUDENT IN AN ORGANIZED HEALTH CARE EDUCATION/TRAINING PROGRAM

## 2024-01-08 PROCEDURE — 99214 OFFICE O/P EST MOD 30 MIN: CPT | Performed by: INTERNAL MEDICINE

## 2024-01-08 ASSESSMENT — ENCOUNTER SYMPTOMS
GASTROINTESTINAL NEGATIVE: 1
WEAKNESS: 1
PSYCHIATRIC NEGATIVE: 1
RESPIRATORY NEGATIVE: 1
MUSCULOSKELETAL NEGATIVE: 1
FATIGUE: 1

## 2024-01-08 NOTE — PROGRESS NOTES
Follow-up Rheumatology Patient Visit    Chief Complaint:  Bing Holliday is a 63 y.o. female presenting today for No chief complaint on file..    History of Presenting Problem:   64 y/o female with Hx of SLE (diagnosed 34 yrs ago) with Jaccoud's arthropathy, osteopenia, gout arthropathy, Adrenal insufficiency presents for evaluation  Current meds: mycophenolate 2 tablet twice a day. Prednisone 5mg, Benlysta IV    Today She is currently in the SNF, she has had more than 6 hospitalization in 2023. Her last Hospitalization was due to UTI, Nose bleed and Hypoglycemia   she reports she is doing much better, she denies any current auditory or visual hallucinations.. She is on risperidone 0.5 mg.  Patient is also  being managed by endocrinology for diabetes and adrenal insufficiency.  She is supposed to be taking 10 mg of prednisone in the morning and 5 mg of prednisone in the afternoon.  Patient has not been able to schedule dental appointment for clearance for osteoporosis treatment.  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[facility])    Hyperlipidemia    Pneumonia of both lower lobes due to infectious organism    Hallucinations    Leg swelling    Hyperglycemia    JED (acute kidney injury) (CMS/[facility])    Hypernatremia    Proliferative diabetic retinopathy of right eye without macular edema determined by examination associated with type 2 diabetes mellitus (CMS/[facility])    Urinary incontinence    Weakness of both lower extremities    Abdominal cramping    Abnormal echocardiogram    Abnormal gait    Abnormal thyroid blood test    Advanced COPD (CMS/[facility])    Afferent pupillary defect of right eye    Anemia     Pancytopenia (CMS/HCC)    Altitudinal scotoma of right eye    Arcuate scotoma of left eye    Arthropathy    Asymptomatic PVCs    Atrial fibrillation (CMS/HCC)    Balance problem    Bilateral high frequency sensorineural hearing loss    Bradycardia    Branch retinal artery occlusion    Cardiomyopathy (CMS/HCC)    Chronic diarrhea    Chronic fatigue    Chronic kidney disease (CKD), stage III (moderate) (CMS/HCC)    CKD stage G3a/A2, GFR 45-59 and albumin creatinine ratio  mg/g (CMS/HCC)    Combined forms of age-related cataract of left eye    Combined forms of age-related cataract of right eye    Contracture of hand    Snoring    CVA (cerebral vascular accident) (CMS/HCC)    Daytime somnolence    Degeneration of lumbar/lumbosacral disc without myelopathy    Depression, major    Dizziness    Dupuytren's contracture    Eczema    Elevated alkaline phosphatase level    Encephalopathy    Facial twitching    Fibromyalgia    Fungal nail infection    GERD (gastroesophageal reflux disease)    Gouty arthropathy    H/O iritis    H/O orthostatic hypotension    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HCC)    High level of cardiac marker    Hip hematoma, right    Hypothermia    Insomnia    Leg edema, left    Loss of consciousness (CMS/HCC)    Low blood sugar reading    Lung nodule, multiple    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HCC)    Metabolic encephalopathy    Muscle cramps    Nodule of skin of left lower leg    Obstructive lung disease (CMS/HCC)    Osteoarthritis of left hand    Osteoarthritis of right hand    Osteopenia    Osteoporosis    Palpitations    Peripheral visual field defect    Persistent proteinuria    Positive colorectal cancer screening using Cologuard test    Benign essential HTN    Psychosis (CMS/HCC)    Pulmonary hypertension (CMS/HCC)    Refractive error    Hypertensive retinopathy    Retinal neovascularization    Secondary pulmonary arterial hypertension (CMS/HCC)    Shortness of breath on exertion    Spinal stenosis of  lumbar region with neurogenic claudication    Subjective tinnitus of both ears    Swelling of right foot    Syncope and collapse    Thrombophlebitis of superficial veins of left lower extremity    Tinnitus of left ear    Vitamin D deficiency    DM type 2 with diabetic peripheral neuropathy (CMS/[facility])    Systemic lupus erythematosus (CMS/[facility])    Diabetic nephropathy (CMS/[facility])    Functional diarrhea    UTI (urinary tract infection)    Moderate protein-calorie malnutrition (CMS/[facility])    Shock, unspecified (CMS/[facility])       Past Medical History:   Past Medical History:   Diagnosis Date    Acute upper respiratory infection, unspecified 03/04/2020    Acute URI    Acute upper respiratory infection, unspecified 09/30/2015    URTI (acute upper respiratory infection)    Arthritis     Body mass index (BMI) 23.0-23.9, adult 10/15/2021    BMI 23.0-23.9, adult    Body mass index (BMI) 33.0-33.9, adult 03/04/2020    BMI 33.0-33.9,adult    Cardiomegaly 08/27/2013    Left ventricular hypertrophy    Chronic kidney disease, stage 3 unspecified (CMS/[facility]) 07/02/2013    Chronic kidney disease, stage III (moderate)    Disease of pericardium, unspecified 07/02/2013    Pericardial disease    Encounter for follow-up examination after completed treatment for conditions other than malignant neoplasm 10/06/2022    Hospital discharge follow-up    Generalized contraction of visual field, right eye 01/29/2015    Generalized contraction of visual field of right eye    Homonymous bilateral field defects, right side 04/29/2016    Homonymous bilateral field defects of right side    Hypertensive chronic kidney disease with stage 1 through stage 4 chronic kidney disease, or unspecified chronic kidney disease 07/02/2013    Nephrosclerosis    Laceration without foreign body, left foot, initial encounter 07/03/2018    Foot laceration, left, initial encounter    Migraine with aura, not intractable, without status migrainosus 10/24/2022    Ocular migraine     Other conditions influencing health status 07/02/2013    Chronic Glomerulonephritis In Diseases Classified Elsewhere    Other conditions influencing health status 07/02/2013    Progressive Familial Myoclonic Epilepsy    Other conditions influencing health status 07/02/2013    Protein S Deficiency    Other conditions influencing health status 05/22/2015    Familial Combined Hyperlipidemia    Other conditions influencing health status 10/24/2022    IDDM (insulin dependent diabetes mellitus)    Other conditions influencing health status 03/14/2022    Diabetes mellitus, insulin dependent (IDDM), uncontrolled    Other long term (current) drug therapy 10/24/2022    Long-term use of Plaquenil    Personal history of diseases of the blood and blood-forming organs and certain disorders involving the immune mechanism 07/02/2013    History of thrombocytopenia    Personal history of diseases of the skin and subcutaneous tissue 08/11/2015    History of foot ulcer    Personal history of nephrotic syndrome 07/02/2013    History of nephrotic syndrome    Personal history of other diseases of the circulatory system 08/27/2013    History of sinus tachycardia    Personal history of other diseases of the nervous system and sense organs     History of cataract    Personal history of other diseases of the respiratory system     History of bronchitis    Personal history of other infectious and parasitic diseases 07/02/2013    History of hepatitis    Personal history of other specified conditions     History of shortness of breath    Personal history of other specified conditions 08/27/2013    History of edema    Puckering of macula, right eye 10/24/2022    ERM OD (epiretinal membrane, right eye)    Raynaud's syndrome without gangrene 07/02/2013    Raynaud's disease    Systemic lupus erythematosus, unspecified (CMS/[facility]) 07/24/2015    SLE (systemic lupus erythematosus)    Systemic lupus erythematosus, unspecified (CMS/[facility]) 07/24/2015    SLE  (systemic lupus erythematosus)    Systemic lupus erythematosus, unspecified (CMS/HCC) 07/24/2015    Systemic lupus    Type 2 diabetes mellitus with diabetic nephropathy (CMS/HCC) 07/02/2013    Type 2 diabetes with nephropathy    Type 2 diabetes mellitus with mild nonproliferative diabetic retinopathy without macular edema, left eye (CMS/HCC) 07/27/2015    Non-proliferative diabetic retinopathy, left eye    Type 2 diabetes mellitus with mild nonproliferative diabetic retinopathy without macular edema, unspecified eye (CMS/HCC) 07/24/2015    Mild non proliferative diabetic retinopathy    Type 2 diabetes mellitus with proliferative diabetic retinopathy without macular edema, right eye (CMS/HCC) 07/27/2015    Proliferative diabetic retinopathy of right eye    Type 2 diabetes mellitus with proliferative diabetic retinopathy without macular edema, unspecified eye (CMS/HCC) 07/24/2015    Diabetic proliferative retinopathy    Unspecified acute and subacute iridocyclitis 07/24/2015    Acute iritis, right eye    Unspecified open wound, left foot, sequela 07/03/2018    Wound, open, foot, left, sequela       Surgical History:   Past Surgical History:   Procedure Laterality Date    ANKLE SURGERY  01/29/2015    Ankle Surgery    CHOLECYSTECTOMY  01/29/2015    Cholecystectomy    CT GUIDED PERCUTANEOUS BIOPSY BONE DEEP  5/4/2021    CT GUIDED PERCUTANEOUS BIOPSY BONE DEEP 5/4/2021 [facility] CLINICAL LEGACY    EYE SURGERY  03/06/2015    Eye Surgery    FOOT SURGERY  01/29/2015    Foot Surgery    MR HEAD ANGIO WO IV CONTRAST  7/26/2013    MR HEAD ANGIO WO IV CONTRAST 7/26/2013 [facility] CLINICAL LEGACY    MR HEAD ANGIO WO IV CONTRAST  9/17/2021    MR HEAD ANGIO WO IV CONTRAST 9/17/2021 [facility]    MR HEAD ANGIO WO IV CONTRAST  3/25/2023    MR HEAD ANGIO WO IV CONTRAST [facility]    MR NECK ANGIO WO IV CONTRAST  7/26/2013    MR NECK ANGIO WO IV CONTRAST 7/26/2013 [facility] CLINICAL LEGACY    MR NECK ANGIO WO IV CONTRAST  9/17/2021    MR NECK  "ANGIO WO IV CONTRAST 9/17/2021 [facility]    MR NECK ANGIO WO IV CONTRAST  3/25/2023    MR NECK ANGIO WO IV CONTRAST [facility]    OTHER SURGICAL HISTORY  01/29/2015    Creation Of Pericardial Window    OTHER SURGICAL HISTORY  01/29/2015    Quadricepsplasty    TOTAL HIP ARTHROPLASTY  01/29/2015    Hip Replacement        Allergies:   Allergies   Allergen Reactions    Ace Inhibitors Other, Angioedema and Swelling     \"FACIAL TWISTING\"    'FACIAL TWISTING\" \"LOOKS LIKE I HAD A STROKE IN MY SLEEP\"    Hydroxychloroquine Unknown     RETINAL BLEEDING    RETINAL BLEEDING, Eye problems    Lisinopril Swelling     PT. CLAIMS SWOLLEN FACE    Penicillins Unknown     tolerates cephalosporins    From Childhood    Mbr sts that she was told since young that she is allergic    Sulfa (Sulfonamide Antibiotics) Hives       Medications:   Current Outpatient Medications:     acetaminophen (Tylenol) 325 mg tablet, Take 2 tablets (650 mg) by mouth every 4 hours if needed for mild pain (1 - 3)., Disp: , Rfl:     amLODIPine (Norvasc) 2.5 mg tablet, Take 1 tablet (2.5 mg) by mouth once daily., Disp: , Rfl:     atorvastatin (Lipitor) 40 mg tablet, Take 1 tablet (40 mg) by mouth once daily., Disp: 90 tablet, Rfl: 3    belimumab (Benlysta) 400 mg recon soln IV injection, Infuse 10 mg/kg into a venous catheter every 28 (twenty-eight) days.  (900mg per dose), Disp: , Rfl:     blood-glucose sensor (Dexcom G6 Sensor) device, Use to check sugars 3 times daily, Disp: 4 each, Rfl: 2    Ca carb-D3-mag ck-lto-thse-Zn 300 mg-20 mcg- 25 mg-0.5 mg tablet, Take 1 tablet by mouth 2 times a day., Disp: , Rfl:     Dexcom G4 platinum  (Dexcom G6 ) misc, Use as instructed, Disp: 1 each, Rfl: 0    Dexcom G4 platinum transmitter (Dexcom G6 Transmitter) device, Use as instructed, Disp: 1 each, Rfl: 0    fluticasone-umeclidin-vilanter (TRELEGY-ELLIPTA) 200-62.5-25 mcg blister with device, Inhale 1 puff once daily., Disp: 60 each, Rfl: 5    folic " acid (Folvite) 1 mg tablet, TAKE 1 TABLET BY MOUTH EVERY DAY, Disp: 90 tablet, Rfl: 1    magnesium oxide (Mag-Ox) 400 mg tablet, Take 1 tablet (400 mg) by mouth once daily., Disp: , Rfl:     melatonin 5 mg tablet, Take 1 tablet (5 mg) by mouth once daily at bedtime., Disp: , Rfl:     multivitamin tablet, Take 1 tablet by mouth once daily., Disp: , Rfl:     mycophenolate (Cellcept) 500 mg tablet, Take 2 tablets (1,000 mg) by mouth twice a day., Disp: , Rfl:     pantoprazole (ProtoNix) 40 mg EC tablet, TAKE 1 TABLET BY MOUTH EVERY DAY, Disp: 90 tablet, Rfl: 1    predniSONE (Deltasone) 10 mg tablet, Take 1 tablet (10 mg) by mouth once daily in the morning. Take first thing when you wake up every morning., Disp: 30 tablet, Rfl: 2    predniSONE (Deltasone) 5 mg tablet, Take 1 tablet (5 mg) by mouth see administration instructions. Take one tablet everyday at 2PM scheduled, Disp: 30 tablet, Rfl: 2    risperiDONE (RisperDAL) 0.5 mg tablet, TAKE 1 TABLET BY MOUTH AT BEDTIME, Disp: 30 tablet, Rfl: 0    torsemide (Demadex) 10 mg tablet, TAKE 1 TABLET BY MOUTH EVERY DAY, Disp: 90 tablet, Rfl: 0      Objective   Physical Examination:    Visit Vitals  LMP  (LMP Unknown)   OB Status Postmenopausal   Smoking Status Never          Procedures :None    Orders:  No orders of the defined types were placed in this encounter.       Provider Impression:   Assessment/Plan   Encounter Diagnoses   Name Primary?    Systemic lupus erythematosus, unspecified SLE type, unspecified organ involvement status (CMS/[facility]) Yes    Osteoporosis, unspecified osteoporosis type, unspecified pathological fracture presence       62-year-old of female with history of lupus cerebritis, lupus nephritis with Jaccoud's arthropathy, She was found to be pancytopenic January 2021, responded to Solu-Medrol. spinal stenosis, T12 vertebral compression fracture which is chronic. A-fib on Elliquis  Recent hospitalization for lupus cerebritis with psychosis and acute anemia  due to large hematoma in the pelvic region in 2022  Patient has since had multiple admissions to the hospital for 2023   -Plan is to Continue with Benlysta IV monthly   -Continue mycophenolate 1000 mg BID  -Hold Probenecid for now given fluctuating renal function , use colchicine PRN  -Review her bone density worse score was -2.4 in the total left hip, although her FRAX score is low, She still has high risk factor due to chronic steroid use.Discontinue Alendronate at this time give GI issues  Recent Jan 2021 DXA show worse score of -3.1 in the forearm... Plan is for Reclast, need dental clearance. Patient has been told she needs to have several teeth pulled. she plans to reschedule appt.  -Patient to follow with endocrine regarding issues related to adrenal insufficiency and hypoglycemia.  Endocrine currently has patient on a total of 15 mg of prednisone  -Will follow in 2 months

## 2024-01-09 NOTE — PROGRESS NOTES
Subjective   Patient ID: Bing Holliday is a 63 y.o. female.    Patient seen and examined at bedside.  She states that over the weekend, she has endorsed some worsening leg swelling.  In addition on labs, BUN and creatinine has overall been worsening.  She endorses tenderness and pain and swelling on her left posterior leg.  This has been ongoing for the past couple of days.  She states that she is planning on discharging today as well.  Otherwise states no acute complaints or concerns.        Review of Systems   Constitutional:  Positive for fatigue.   HENT: Negative.     Respiratory: Negative.     Cardiovascular:  Positive for leg swelling.        RLE swelling vs left   Gastrointestinal: Negative.    Musculoskeletal: Negative.    Neurological:  Positive for weakness.   Psychiatric/Behavioral: Negative.         Objective  Vitals Reviewed via facility EMR   Physical Exam  Constitutional:       General: She is not in acute distress.     Appearance: She is not ill-appearing.   Eyes:      Pupils: Pupils are equal, round, and reactive to light.   Cardiovascular:      Rate and Rhythm: Normal rate and regular rhythm.      Pulses: Normal pulses.      Heart sounds: No murmur heard.  Pulmonary:      Effort: No respiratory distress.      Breath sounds: No wheezing.   Abdominal:      General: Abdomen is flat. Bowel sounds are normal. There is no distension.   Musculoskeletal:      Right lower leg: Edema present.      Left lower leg: No edema.      Comments: Homans sign positive   Skin:     General: Skin is warm and dry.   Neurological:      Mental Status: She is alert. Mental status is at baseline.      Cranial Nerves: No cranial nerve deficit.      Motor: No weakness.   Psychiatric:         Mood and Affect: Mood normal.         Behavior: Behavior normal.         Assessment/Plan   Diagnoses and all orders for this visit:  Benign essential HTN  DM type 2 with diabetic peripheral neuropathy (CMS/HCC)  CKD stage G3a/A2, GFR  45-59 and albumin creatinine ratio  mg/g (CMS/[facility])  JED (acute kidney injury) (CMS/[facility])  Lupus (CMS/[facility])  Swelling of right foot  Weakness of both lower extremities    Patient seen and examined, noted asymmetrical swelling of right lower extremity versus left lower extremity.  There is high concern for DVT at this time as patient is mostly residing in the bed and having poor mobility.  In addition, suspect underlying overdiuresis due to JED.  Recommend duplex of lower extremity stat.  In addition hold diuretics and obtain lab work tomorrow to further evaluate.  Hold discharge today as patient overall medically unstable and would be high risk of rehospitalization if patient was discharged today.  Discussed with patient staff and are agreeable.  Continue supportive care.    Ayaan Koehler DO

## 2024-01-10 ENCOUNTER — PATIENT OUTREACH (OUTPATIENT)
Dept: PRIMARY CARE | Facility: CLINIC | Age: 64
End: 2024-01-10
Payer: MEDICARE

## 2024-01-10 ENCOUNTER — PHARMACY VISIT (OUTPATIENT)
Dept: PHARMACY | Facility: CLINIC | Age: 64
End: 2024-01-10
Payer: MEDICARE

## 2024-01-10 DIAGNOSIS — M32.9 LUPUS (MULTI): ICD-10-CM

## 2024-01-10 DIAGNOSIS — E11.42 DM TYPE 2 WITH DIABETIC PERIPHERAL NEUROPATHY (MULTI): ICD-10-CM

## 2024-01-10 DIAGNOSIS — R53.1 WEAKNESS: ICD-10-CM

## 2024-01-10 PROCEDURE — 99490 CHRNC CARE MGMT STAFF 1ST 20: CPT | Performed by: FAMILY MEDICINE

## 2024-01-10 NOTE — PROGRESS NOTES
Discharge facility: [facility]  Discharge diagnosis: Weakness  Admission date: 12/23/23  Discharge date: 1/9/24    PCP Appointment Date: TBD  Specialist Appointment Date: N/A  Hospital Encounter and Summary: not available at this time  See Discharge assessment below for further details.     Engagement  Call Start Time: 1055 (1/10/2024 11:01 AM)    Medications  Medications reviewed with patient/caregiver?: Yes (1/10/2024 11:01 AM)  Is the patient having any side effects they believe may be caused by any medication additions or changes?: No (1/10/2024 11:01 AM)  Does the patient have all medications ordered at discharge?: Not applicable (1/10/2024 11:01 AM)  Care Management Interventions: No intervention needed (1/10/2024 11:01 AM)  Is the patient taking all medications as directed (includes completed medication regime)?: Yes (1/10/2024 11:01 AM)    Appointments  Does the patient have a primary care provider?: Yes (1/10/2024 11:01 AM)  Care Management Interventions: Advised patient to make appointment; Educated patient on importance of making appointment (1/10/2024 11:01 AM)  Has the patient kept scheduled appointments due by today?: Yes (1/10/2024 11:01 AM)  Care Management Interventions: Educated on importance of keeping appointment (1/10/2024 11:01 AM)  Follow Up Tasks: Appointments (wans to inquire VV - message to office) (1/10/2024 11:01 AM)    Self Management  What is the home health agency?: [facility] (1/10/2024 11:01 AM)  Has home health visited the patient within 72 hours of discharge?: Call prior to 72 hours (1/10/2024 11:01 AM)  Home Health Interventions: -- (Patient unaware of [facility] order; call to [place] to confirm. Patient made aware.) (1/10/2024 11:01 AM)    Patient Teaching  Does the patient have access to their discharge instructions?: Yes (1/10/2024 11:01 AM)  Care Management Interventions: Educated on MyChart (1/10/2024 11:01 AM)  What is the patient's perception of their health status since  discharge?: Improving (1/10/2024 11:01 AM)  Is the patient/caregiver able to teach back the hierarchy of who to call/visit for symptoms/problems? PCP, Specialist, Home Health nurse, Urgent Care, ED, 911: Yes (1/10/2024 11:01 AM)    Wrap Up  Is the patient/caregiver familiar with Advance Care Planning?: Yes (1/10/2024 11:01 AM)  Would the patient like more information on Advance Care Planning?: No (1/10/2024 11:01 AM)  Call End Time: 1102 (1/10/2024 11:01 AM)

## 2024-01-14 ENCOUNTER — APPOINTMENT (OUTPATIENT)
Dept: CARDIOLOGY | Facility: HOSPITAL | Age: 64
DRG: 643 | End: 2024-01-14
Payer: MEDICARE

## 2024-01-14 ENCOUNTER — APPOINTMENT (OUTPATIENT)
Dept: RADIOLOGY | Facility: HOSPITAL | Age: 64
DRG: 643 | End: 2024-01-14
Payer: MEDICARE

## 2024-01-14 ENCOUNTER — HOSPITAL ENCOUNTER (INPATIENT)
Facility: HOSPITAL | Age: 64
LOS: 2 days | Discharge: OTHER INSTITUTION | DRG: 643 | End: 2024-01-17
Attending: STUDENT IN AN ORGANIZED HEALTH CARE EDUCATION/TRAINING PROGRAM | Admitting: STUDENT IN AN ORGANIZED HEALTH CARE EDUCATION/TRAINING PROGRAM
Payer: MEDICARE

## 2024-01-14 DIAGNOSIS — E27.40 ADRENAL INSUFFICIENCY (MULTI): Primary | ICD-10-CM

## 2024-01-14 DIAGNOSIS — R57.9 SHOCK, UNSPECIFIED (MULTI): ICD-10-CM

## 2024-01-14 DIAGNOSIS — I80.02 THROMBOPHLEBITIS OF SUPERFICIAL VEINS OF LEFT LOWER EXTREMITY: ICD-10-CM

## 2024-01-14 DIAGNOSIS — R60.0 LEG EDEMA, LEFT: ICD-10-CM

## 2024-01-14 DIAGNOSIS — E87.0 HYPERNATREMIA: ICD-10-CM

## 2024-01-14 DIAGNOSIS — N30.00 ACUTE CYSTITIS WITHOUT HEMATURIA: ICD-10-CM

## 2024-01-14 DIAGNOSIS — R22.1 LOCALIZED SWELLING, MASS AND LUMP, NECK: ICD-10-CM

## 2024-01-14 DIAGNOSIS — I95.9 HYPOTENSION, UNSPECIFIED HYPOTENSION TYPE: ICD-10-CM

## 2024-01-14 DIAGNOSIS — G93.40 ENCEPHALOPATHY ACUTE: ICD-10-CM

## 2024-01-14 DIAGNOSIS — M79.89 SWELLING OF RIGHT FOOT: ICD-10-CM

## 2024-01-14 DIAGNOSIS — R56.9 SEIZURE-LIKE ACTIVITY (MULTI): ICD-10-CM

## 2024-01-14 DIAGNOSIS — E78.49 OTHER HYPERLIPIDEMIA: Chronic | ICD-10-CM

## 2024-01-14 DIAGNOSIS — M79.89 LEG SWELLING: ICD-10-CM

## 2024-01-14 DIAGNOSIS — E78.5 HYPERLIPIDEMIA, UNSPECIFIED HYPERLIPIDEMIA TYPE: ICD-10-CM

## 2024-01-14 LAB
ALBUMIN SERPL BCP-MCNC: 3.2 G/DL (ref 3.4–5)
ALP SERPL-CCNC: 96 U/L (ref 33–136)
ALT SERPL W P-5'-P-CCNC: 38 U/L (ref 7–45)
ANION GAP SERPL CALC-SCNC: 15 MMOL/L (ref 10–20)
APPEARANCE UR: ABNORMAL
AST SERPL W P-5'-P-CCNC: 33 U/L (ref 9–39)
BASOPHILS # BLD AUTO: 0.01 X10*3/UL (ref 0–0.1)
BASOPHILS NFR BLD AUTO: 0.2 %
BILIRUB SERPL-MCNC: 0.2 MG/DL (ref 0–1.2)
BILIRUB UR STRIP.AUTO-MCNC: NEGATIVE MG/DL
BUN SERPL-MCNC: 51 MG/DL (ref 6–23)
CALCIUM SERPL-MCNC: 8.1 MG/DL (ref 8.6–10.3)
CARDIAC TROPONIN I PNL SERPL HS: 27 NG/L (ref 0–13)
CARDIAC TROPONIN I PNL SERPL HS: 32 NG/L (ref 0–13)
CHLORIDE SERPL-SCNC: 110 MMOL/L (ref 98–107)
CO2 SERPL-SCNC: 27 MMOL/L (ref 21–32)
COLOR UR: YELLOW
CREAT SERPL-MCNC: 2.24 MG/DL (ref 0.5–1.05)
EGFRCR SERPLBLD CKD-EPI 2021: 24 ML/MIN/1.73M*2
EOSINOPHIL # BLD AUTO: 0.01 X10*3/UL (ref 0–0.7)
EOSINOPHIL NFR BLD AUTO: 0.2 %
ERYTHROCYTE [DISTWIDTH] IN BLOOD BY AUTOMATED COUNT: 20 % (ref 11.5–14.5)
FLUAV RNA RESP QL NAA+PROBE: NOT DETECTED
FLUBV RNA RESP QL NAA+PROBE: NOT DETECTED
GLUCOSE BLD MANUAL STRIP-MCNC: 160 MG/DL (ref 74–99)
GLUCOSE BLD MANUAL STRIP-MCNC: 54 MG/DL (ref 74–99)
GLUCOSE BLD MANUAL STRIP-MCNC: 81 MG/DL (ref 74–99)
GLUCOSE BLD MANUAL STRIP-MCNC: 91 MG/DL (ref 74–99)
GLUCOSE SERPL-MCNC: 66 MG/DL (ref 74–99)
GLUCOSE UR STRIP.AUTO-MCNC: NEGATIVE MG/DL
HCT VFR BLD AUTO: 27.7 % (ref 36–46)
HGB BLD-MCNC: 8.2 G/DL (ref 12–16)
HOLD SPECIMEN: NORMAL
HOLD SPECIMEN: NORMAL
IMM GRANULOCYTES # BLD AUTO: 0.02 X10*3/UL (ref 0–0.7)
IMM GRANULOCYTES NFR BLD AUTO: 0.4 % (ref 0–0.9)
KETONES UR STRIP.AUTO-MCNC: NEGATIVE MG/DL
LACTATE SERPL-SCNC: 0.7 MMOL/L (ref 0.4–2)
LACTATE SERPL-SCNC: 2.1 MMOL/L (ref 0.4–2)
LACTATE SERPL-SCNC: 3.4 MMOL/L (ref 0.4–2)
LEUKOCYTE ESTERASE UR QL STRIP.AUTO: NEGATIVE
LYMPHOCYTES # BLD AUTO: 0.67 X10*3/UL (ref 1.2–4.8)
LYMPHOCYTES NFR BLD AUTO: 13.6 %
MAGNESIUM SERPL-MCNC: 1.5 MG/DL (ref 1.6–2.4)
MCH RBC QN AUTO: 29.9 PG (ref 26–34)
MCHC RBC AUTO-ENTMCNC: 29.6 G/DL (ref 32–36)
MCV RBC AUTO: 101 FL (ref 80–100)
MONOCYTES # BLD AUTO: 0.15 X10*3/UL (ref 0.1–1)
MONOCYTES NFR BLD AUTO: 3 %
NEUTROPHILS # BLD AUTO: 4.08 X10*3/UL (ref 1.2–7.7)
NEUTROPHILS NFR BLD AUTO: 82.6 %
NITRITE UR QL STRIP.AUTO: NEGATIVE
NRBC BLD-RTO: 1 /100 WBCS (ref 0–0)
PH UR STRIP.AUTO: 5 [PH]
PLATELET # BLD AUTO: 101 X10*3/UL (ref 150–450)
POTASSIUM SERPL-SCNC: 4 MMOL/L (ref 3.5–5.3)
PROT SERPL-MCNC: 5.2 G/DL (ref 6.4–8.2)
PROT UR STRIP.AUTO-MCNC: ABNORMAL MG/DL
RBC # BLD AUTO: 2.74 X10*6/UL (ref 4–5.2)
RBC # UR STRIP.AUTO: NEGATIVE /UL
RBC #/AREA URNS AUTO: NORMAL /HPF
SARS-COV-2 RNA RESP QL NAA+PROBE: NOT DETECTED
SODIUM SERPL-SCNC: 148 MMOL/L (ref 136–145)
SP GR UR STRIP.AUTO: 1.01
UROBILINOGEN UR STRIP.AUTO-MCNC: <2 MG/DL
WBC # BLD AUTO: 4.9 X10*3/UL (ref 4.4–11.3)
WBC #/AREA URNS AUTO: NORMAL /HPF

## 2024-01-14 PROCEDURE — 36415 COLL VENOUS BLD VENIPUNCTURE: CPT

## 2024-01-14 PROCEDURE — 85025 COMPLETE CBC W/AUTO DIFF WBC: CPT

## 2024-01-14 PROCEDURE — 99223 1ST HOSP IP/OBS HIGH 75: CPT | Performed by: INTERNAL MEDICINE

## 2024-01-14 PROCEDURE — 2500000001 HC RX 250 WO HCPCS SELF ADMINISTERED DRUGS (ALT 637 FOR MEDICARE OP)

## 2024-01-14 PROCEDURE — 80053 COMPREHEN METABOLIC PANEL: CPT

## 2024-01-14 PROCEDURE — 96366 THER/PROPH/DIAG IV INF ADDON: CPT

## 2024-01-14 PROCEDURE — 2500000005 HC RX 250 GENERAL PHARMACY W/O HCPCS: Performed by: STUDENT IN AN ORGANIZED HEALTH CARE EDUCATION/TRAINING PROGRAM

## 2024-01-14 PROCEDURE — 96365 THER/PROPH/DIAG IV INF INIT: CPT

## 2024-01-14 PROCEDURE — 71250 CT THORAX DX C-: CPT | Mod: FOREIGN READ | Performed by: RADIOLOGY

## 2024-01-14 PROCEDURE — 74176 CT ABD & PELVIS W/O CONTRAST: CPT

## 2024-01-14 PROCEDURE — 87636 SARSCOV2 & INF A&B AMP PRB: CPT

## 2024-01-14 PROCEDURE — 99291 CRITICAL CARE FIRST HOUR: CPT | Performed by: STUDENT IN AN ORGANIZED HEALTH CARE EDUCATION/TRAINING PROGRAM

## 2024-01-14 PROCEDURE — 70450 CT HEAD/BRAIN W/O DYE: CPT

## 2024-01-14 PROCEDURE — 81001 URINALYSIS AUTO W/SCOPE: CPT | Performed by: STUDENT IN AN ORGANIZED HEALTH CARE EDUCATION/TRAINING PROGRAM

## 2024-01-14 PROCEDURE — 71045 X-RAY EXAM CHEST 1 VIEW: CPT | Mod: FOREIGN READ | Performed by: RADIOLOGY

## 2024-01-14 PROCEDURE — 82947 ASSAY GLUCOSE BLOOD QUANT: CPT

## 2024-01-14 PROCEDURE — 84484 ASSAY OF TROPONIN QUANT: CPT

## 2024-01-14 PROCEDURE — 96361 HYDRATE IV INFUSION ADD-ON: CPT

## 2024-01-14 PROCEDURE — 83605 ASSAY OF LACTIC ACID: CPT | Performed by: STUDENT IN AN ORGANIZED HEALTH CARE EDUCATION/TRAINING PROGRAM

## 2024-01-14 PROCEDURE — 36415 COLL VENOUS BLD VENIPUNCTURE: CPT | Performed by: STUDENT IN AN ORGANIZED HEALTH CARE EDUCATION/TRAINING PROGRAM

## 2024-01-14 PROCEDURE — 96375 TX/PRO/DX INJ NEW DRUG ADDON: CPT

## 2024-01-14 PROCEDURE — 74176 CT ABD & PELVIS W/O CONTRAST: CPT | Mod: FOREIGN READ | Performed by: RADIOLOGY

## 2024-01-14 PROCEDURE — 99291 CRITICAL CARE FIRST HOUR: CPT

## 2024-01-14 PROCEDURE — 2500000004 HC RX 250 GENERAL PHARMACY W/ HCPCS (ALT 636 FOR OP/ED)

## 2024-01-14 PROCEDURE — 83735 ASSAY OF MAGNESIUM: CPT

## 2024-01-14 PROCEDURE — 93005 ELECTROCARDIOGRAM TRACING: CPT

## 2024-01-14 PROCEDURE — 70450 CT HEAD/BRAIN W/O DYE: CPT | Performed by: RADIOLOGY

## 2024-01-14 PROCEDURE — 87040 BLOOD CULTURE FOR BACTERIA: CPT | Mod: STJLAB | Performed by: STUDENT IN AN ORGANIZED HEALTH CARE EDUCATION/TRAINING PROGRAM

## 2024-01-14 PROCEDURE — 71045 X-RAY EXAM CHEST 1 VIEW: CPT | Mod: FR

## 2024-01-14 PROCEDURE — 93010 ELECTROCARDIOGRAM REPORT: CPT | Performed by: STUDENT IN AN ORGANIZED HEALTH CARE EDUCATION/TRAINING PROGRAM

## 2024-01-14 PROCEDURE — 96367 TX/PROPH/DG ADDL SEQ IV INF: CPT

## 2024-01-14 RX ORDER — CEFEPIME 1 G/50ML
2 INJECTION, SOLUTION INTRAVENOUS ONCE
Status: COMPLETED | OUTPATIENT
Start: 2024-01-14 | End: 2024-01-14

## 2024-01-14 RX ORDER — METRONIDAZOLE 500 MG/100ML
500 INJECTION, SOLUTION INTRAVENOUS ONCE
Status: COMPLETED | OUTPATIENT
Start: 2024-01-14 | End: 2024-01-14

## 2024-01-14 RX ORDER — NYSTATIN 100000 [USP'U]/G
1 POWDER TOPICAL ONCE
Status: COMPLETED | OUTPATIENT
Start: 2024-01-14 | End: 2024-01-14

## 2024-01-14 RX ORDER — ENOXAPARIN SODIUM 100 MG/ML
40 INJECTION SUBCUTANEOUS EVERY 24 HOURS
Status: CANCELLED | OUTPATIENT
Start: 2024-01-14

## 2024-01-14 RX ORDER — VANCOMYCIN 1.75 GRAM/500 ML IN 0.9 % SODIUM CHLORIDE INTRAVENOUS
1750 ONCE
Status: COMPLETED | OUTPATIENT
Start: 2024-01-14 | End: 2024-01-14

## 2024-01-14 RX ORDER — DEXTROSE 50 % IN WATER (D50W) INTRAVENOUS SYRINGE
25 ONCE
Status: COMPLETED | OUTPATIENT
Start: 2024-01-14 | End: 2024-01-14

## 2024-01-14 RX ORDER — MAGNESIUM SULFATE HEPTAHYDRATE 40 MG/ML
2 INJECTION, SOLUTION INTRAVENOUS ONCE
Status: COMPLETED | OUTPATIENT
Start: 2024-01-14 | End: 2024-01-14

## 2024-01-14 RX ADMIN — HYDROCORTISONE SODIUM SUCCINATE 100 MG: 100 INJECTION, POWDER, FOR SOLUTION INTRAMUSCULAR; INTRAVENOUS at 20:53

## 2024-01-14 RX ADMIN — Medication 1750 MG: at 19:10

## 2024-01-14 RX ADMIN — SODIUM CHLORIDE 2000 ML: 9 INJECTION, SOLUTION INTRAVENOUS at 14:36

## 2024-01-14 RX ADMIN — CEFEPIME 2 G: 1 INJECTION, SOLUTION INTRAVENOUS at 17:01

## 2024-01-14 RX ADMIN — NYSTATIN 1 APPLICATION: 100000 POWDER TOPICAL at 14:53

## 2024-01-14 RX ADMIN — METRONIDAZOLE 500 MG: 500 INJECTION, SOLUTION INTRAVENOUS at 18:02

## 2024-01-14 RX ADMIN — SODIUM CHLORIDE, POTASSIUM CHLORIDE, SODIUM LACTATE AND CALCIUM CHLORIDE 2000 ML: 600; 310; 30; 20 INJECTION, SOLUTION INTRAVENOUS at 15:14

## 2024-01-14 RX ADMIN — DEXTROSE MONOHYDRATE 25 G: 25 INJECTION, SOLUTION INTRAVENOUS at 16:22

## 2024-01-14 RX ADMIN — MAGNESIUM SULFATE HEPTAHYDRATE 2 G: 40 INJECTION, SOLUTION INTRAVENOUS at 15:53

## 2024-01-14 ASSESSMENT — LIFESTYLE VARIABLES
EVER HAD A DRINK FIRST THING IN THE MORNING TO STEADY YOUR NERVES TO GET RID OF A HANGOVER: NO
EVER FELT BAD OR GUILTY ABOUT YOUR DRINKING: NO
HAVE YOU EVER FELT YOU SHOULD CUT DOWN ON YOUR DRINKING: NO
HAVE PEOPLE ANNOYED YOU BY CRITICIZING YOUR DRINKING: NO

## 2024-01-14 ASSESSMENT — PAIN SCALES - GENERAL
PAINLEVEL_OUTOF10: 10 - WORST POSSIBLE PAIN
PAINLEVEL_OUTOF10: 5 - MODERATE PAIN
PAINLEVEL_OUTOF10: 10 - WORST POSSIBLE PAIN

## 2024-01-14 ASSESSMENT — PAIN DESCRIPTION - PAIN TYPE: TYPE: ACUTE PAIN

## 2024-01-14 ASSESSMENT — PAIN DESCRIPTION - LOCATION: LOCATION: BACK

## 2024-01-14 ASSESSMENT — PAIN - FUNCTIONAL ASSESSMENT: PAIN_FUNCTIONAL_ASSESSMENT: 0-10

## 2024-01-14 NOTE — ED PROVIDER NOTES
HPI   Chief Complaint   Patient presents with    Back Pain    Weakness, Gen       Patient is a 63-year-old female with lupus, hyperlipidemia, atrial fibrillation on Eliquis, CKD, diabetes, pulmonary hypertension presents emergency department for generalized weakness.  Patient was recently discharged from a skilled nursing facility after being admitted for generalized weakness, urinary tract infection.  Patient does report generalized weakness, but denies any cough, fever, chills, chest pain, shortness of breath, abdominal pain, nausea or vomiting, dysuria.  She does complain of upper and lower back pain.  She is not sure how long this pain has been ongoing.  We called the patient's children, who she is living with.  They state that she has become progressively weaker to the point where she is unable to go to the bathroom or make meals for herself.  She also seemed confused today, not making any sense when she spoke with him.  She complained of lower back pain yesterday.  They have not noticed any coughing, fevers, nausea or vomiting and she has not complained of any of these things to them.  No recent falls as far as they are aware.      History provided by:  Patient and relative                      [place] Coma Scale Score: 15                  Patient History   Past Medical History:   Diagnosis Date    Acute upper respiratory infection, unspecified 03/04/2020    Acute URI    Acute upper respiratory infection, unspecified 09/30/2015    URTI (acute upper respiratory infection)    Arthritis     Body mass index (BMI) 23.0-23.9, adult 10/15/2021    BMI 23.0-23.9, adult    Body mass index (BMI) 33.0-33.9, adult 03/04/2020    BMI 33.0-33.9,adult    Cardiomegaly 08/27/2013    Left ventricular hypertrophy    Chronic kidney disease, stage 3 unspecified (CMS/HCC) 07/02/2013    Chronic kidney disease, stage III (moderate)    Disease of pericardium, unspecified 07/02/2013    Pericardial disease    Encounter for follow-up  examination after completed treatment for conditions other than malignant neoplasm 10/06/2022    Hospital discharge follow-up    Generalized contraction of visual field, right eye 01/29/2015    Generalized contraction of visual field of right eye    Homonymous bilateral field defects, right side 04/29/2016    Homonymous bilateral field defects of right side    Hypertensive chronic kidney disease with stage 1 through stage 4 chronic kidney disease, or unspecified chronic kidney disease 07/02/2013    Nephrosclerosis    Laceration without foreign body, left foot, initial encounter 07/03/2018    Foot laceration, left, initial encounter    Migraine with aura, not intractable, without status migrainosus 10/24/2022    Ocular migraine    Other conditions influencing health status 07/02/2013    Chronic Glomerulonephritis In Diseases Classified Elsewhere    Other conditions influencing health status 07/02/2013    Progressive Familial Myoclonic Epilepsy    Other conditions influencing health status 07/02/2013    Protein S Deficiency    Other conditions influencing health status 05/22/2015    Familial Combined Hyperlipidemia    Other conditions influencing health status 10/24/2022    IDDM (insulin dependent diabetes mellitus)    Other conditions influencing health status 03/14/2022    Diabetes mellitus, insulin dependent (IDDM), uncontrolled    Other long term (current) drug therapy 10/24/2022    Long-term use of Plaquenil    Personal history of diseases of the blood and blood-forming organs and certain disorders involving the immune mechanism 07/02/2013    History of thrombocytopenia    Personal history of diseases of the skin and subcutaneous tissue 08/11/2015    History of foot ulcer    Personal history of nephrotic syndrome 07/02/2013    History of nephrotic syndrome    Personal history of other diseases of the circulatory system 08/27/2013    History of sinus tachycardia    Personal history of other diseases of the nervous  system and sense organs     History of cataract    Personal history of other diseases of the respiratory system     History of bronchitis    Personal history of other infectious and parasitic diseases 07/02/2013    History of hepatitis    Personal history of other specified conditions     History of shortness of breath    Personal history of other specified conditions 08/27/2013    History of edema    Puckering of macula, right eye 10/24/2022    ERM OD (epiretinal membrane, right eye)    Raynaud's syndrome without gangrene 07/02/2013    Raynaud's disease    Systemic lupus erythematosus, unspecified (CMS/HCC) 07/24/2015    SLE (systemic lupus erythematosus)    Systemic lupus erythematosus, unspecified (CMS/HCC) 07/24/2015    SLE (systemic lupus erythematosus)    Systemic lupus erythematosus, unspecified (CMS/HCC) 07/24/2015    Systemic lupus    Type 2 diabetes mellitus with diabetic nephropathy (CMS/HCC) 07/02/2013    Type 2 diabetes with nephropathy    Type 2 diabetes mellitus with mild nonproliferative diabetic retinopathy without macular edema, left eye (CMS/HCC) 07/27/2015    Non-proliferative diabetic retinopathy, left eye    Type 2 diabetes mellitus with mild nonproliferative diabetic retinopathy without macular edema, unspecified eye (CMS/HCC) 07/24/2015    Mild non proliferative diabetic retinopathy    Type 2 diabetes mellitus with proliferative diabetic retinopathy without macular edema, right eye (CMS/HCC) 07/27/2015    Proliferative diabetic retinopathy of right eye    Type 2 diabetes mellitus with proliferative diabetic retinopathy without macular edema, unspecified eye (CMS/HCC) 07/24/2015    Diabetic proliferative retinopathy    Unspecified acute and subacute iridocyclitis 07/24/2015    Acute iritis, right eye    Unspecified open wound, left foot, sequela 07/03/2018    Wound, open, foot, left, sequela     Past Surgical History:   Procedure Laterality Date    ANKLE SURGERY  01/29/2015    Ankle Surgery     CHOLECYSTECTOMY  01/29/2015    Cholecystectomy    CT GUIDED PERCUTANEOUS BIOPSY BONE DEEP  5/4/2021    CT GUIDED PERCUTANEOUS BIOPSY BONE DEEP 5/4/2021 [facility] CLINICAL LEGACY    EYE SURGERY  03/06/2015    Eye Surgery    FOOT SURGERY  01/29/2015    Foot Surgery    MR HEAD ANGIO WO IV CONTRAST  7/26/2013    MR HEAD ANGIO WO IV CONTRAST 7/26/2013 [facility] CLINICAL LEGACY    MR HEAD ANGIO WO IV CONTRAST  9/17/2021    MR HEAD ANGIO WO IV CONTRAST 9/17/2021 [facility]    MR HEAD ANGIO WO IV CONTRAST  3/25/2023    MR HEAD ANGIO WO IV CONTRAST [facility]    MR NECK ANGIO WO IV CONTRAST  7/26/2013    MR NECK ANGIO WO IV CONTRAST 7/26/2013 [facility] CLINICAL LEGACY    MR NECK ANGIO WO IV CONTRAST  9/17/2021    MR NECK ANGIO WO IV CONTRAST 9/17/2021 [facility]    MR NECK ANGIO WO IV CONTRAST  3/25/2023    MR NECK ANGIO WO IV CONTRAST [facility]    OTHER SURGICAL HISTORY  01/29/2015    Creation Of Pericardial Window    OTHER SURGICAL HISTORY  01/29/2015    Quadricepsplasty    TOTAL HIP ARTHROPLASTY  01/29/2015    Hip Replacement     Family History   Problem Relation Name Age of Onset    Other (RENAL DISEASE) Mother          END STAGE    Other (CARDIAC DISORDER) Mother      Cataracts Mother      Stroke Mother      Diabetes Mother      Kidney disease Mother      Lupus Mother      Other (CARDIAC DISORDER) Father      COPD Father      Glaucoma Father      Hypertension Father      Sleep apnea Father      Other (CARDIAC DISORDER) Sister      Depression Sister      Kidney disease Sister      Sickle cell trait Sister      Sleep apnea Daughter       Social History     Tobacco Use    Smoking status: Never    Smokeless tobacco: Never   Vaping Use    Vaping Use: Never used   Substance Use Topics    Alcohol use: Never    Drug use: Never       Physical Exam   ED Triage Vitals [01/14/24 1410]   Temp Heart Rate Resp BP   35.8 °C (96.4 °F) (!) 114 20 84/69      SpO2 Temp Source Heart Rate Source Patient Position   96 % Tympanic Monitor Lying       BP Location FiO2 (%)     Right arm --       Physical Exam  Vitals and nursing note reviewed.   Constitutional:       General: She is not in acute distress.     Appearance: She is well-developed.   HENT:      Head: Normocephalic and atraumatic.      Right Ear: External ear normal.      Left Ear: External ear normal.      Nose: Nose normal.      Mouth/Throat:      Mouth: Mucous membranes are moist.   Eyes:      General: No scleral icterus.     Conjunctiva/sclera: Conjunctivae normal.      Pupils: Pupils are equal, round, and reactive to light.   Cardiovascular:      Rate and Rhythm: Tachycardia present. Rhythm irregular.      Heart sounds: No murmur heard.  Pulmonary:      Effort: Pulmonary effort is normal. No respiratory distress.      Breath sounds: Normal breath sounds.   Abdominal:      Palpations: Abdomen is soft.      Tenderness: There is no abdominal tenderness.   Musculoskeletal:         General: No swelling.      Cervical back: Neck supple.      Right lower leg: Edema present.   Skin:     General: Skin is warm and dry.   Neurological:      General: No focal deficit present.      Mental Status: She is alert.   Psychiatric:         Mood and Affect: Mood normal.         ED Course & MDM   Diagnoses as of 01/15/24 0126   Adrenal insufficiency (CMS/[facility])   Hypotension, unspecified hypotension type   Hypernatremia       Medical Decision Making  Patient is a 63-year-old female presents emergency department for generalized weakness.  On arrival, she is afebrile, but noted to be tachycardic in the low 100s with a blood pressure of 84/69.  She is awake and alert, but appears fatigued.  Does not appear toxic.  She does have left lower extremity edema, but on chart review this has been ongoing for at least several weeks which she did see her physician for.  We also confirm with the patient's children that she is taking Eliquis for atrial fibrillation.  Will not pursue D-dimer or duplex ultrasound at this time.   Patient started down the sepsis pathway and 30 mL/kg of IV fluids administered.  Lactate, blood cultures ordered.    Results for orders placed or performed during the hospital encounter of 01/14/24  -CBC and Auto Differential:        Result                      Value             Ref Range           WBC                         4.9               4.4 - 11.3 x*       nRBC                        1.0 (H)           0.0 - 0.0 /1*       RBC                         2.74 (L)          4.00 - 5.20 *       Hemoglobin                  8.2 (L)           12.0 - 16.0 *       Hematocrit                  27.7 (L)          36.0 - 46.0 %       MCV                         101 (H)           80 - 100 fL         MCH                         29.9              26.0 - 34.0 *       MCHC                        29.6 (L)          32.0 - 36.0 *       RDW                         20.0 (H)          11.5 - 14.5 %       Platelets                   101 (L)           150 - 450 x1*       Neutrophils %               82.6              40.0 - 80.0 %       Immature Granulocytes *     0.4               0.0 - 0.9 %         Lymphocytes %               13.6              13.0 - 44.0 %       Monocytes %                 3.0               2.0 - 10.0 %        Eosinophils %               0.2               0.0 - 6.0 %         Basophils %                 0.2               0.0 - 2.0 %         Neutrophils Absolute        4.08              1.20 - 7.70 *       Immature Granulocytes *     0.02              0.00 - 0.70 *       Lymphocytes Absolute        0.67 (L)          1.20 - 4.80 *       Monocytes Absolute          0.15              0.10 - 1.00 *       Eosinophils Absolute        0.01              0.00 - 0.70 *       Basophils Absolute          0.01              0.00 - 0.10 *  -Comprehensive Metabolic Panel:        Result                      Value             Ref Range           Glucose                     66 (L)            74 - 99 mg/dL       Sodium                      148  (H)           136 - 145 mm*       Potassium                   4.0               3.5 - 5.3 mm*       Chloride                    110 (H)           98 - 107 mmo*       Bicarbonate                 27                21 - 32 mmol*       Anion Gap                   15                10 - 20 mmol*       Urea Nitrogen               51 (H)            6 - 23 mg/dL        Creatinine                  2.24 (H)          0.50 - 1.05 *       eGFR                        24 (L)            >60 mL/min/1*       Calcium                     8.1 (L)           8.6 - 10.3 m*       Albumin                     3.2 (L)           3.4 - 5.0 g/*       Alkaline Phosphatase        96                33 - 136 U/L        Total Protein               5.2 (L)           6.4 - 8.2 g/*       AST                         33                9 - 39 U/L          Bilirubin, Total            0.2               0.0 - 1.2 mg*       ALT                         38                7 - 45 U/L     -Magnesium:        Result                      Value             Ref Range           Magnesium                   1.50 (L)          1.60 - 2.40 *  -Sars-CoV-2 and Influenza A/B PCR:        Result                      Value             Ref Range           Flu A Result                Not Detected      Not Detected        Flu B Result                Not Detected      Not Detected        Coronavirus 2019, PCR       Not Detected      Not Detected   -Troponin I, High Sensitivity, Initial:        Result                      Value             Ref Range           Troponin I, High Sensi*     27 (H)            0 - 13 ng/L    -Lactate:        Result                      Value             Ref Range           Lactate                     3.4 (H)           0.4 - 2.0 mm*  -Urinalysis with Reflex Culture and Microscopic:        Result                      Value             Ref Range           Color, Urine                Yellow            Straw, Yellow       Appearance, Urine           Hazy (N)           Clear               Specific Gravity, Urine     1.015             1.005 - 1.035       pH, Urine                   5.0               5.0, 5.5, 6.*       Protein, Urine              100 (2+) (N)      NEGATIVE mg/*       Glucose, Urine              NEGATIVE          NEGATIVE mg/*       Blood, Urine                NEGATIVE          NEGATIVE            Ketones, Urine              NEGATIVE          NEGATIVE mg/*       Bilirubin, Urine            NEGATIVE          NEGATIVE            Urobilinogen, Urine         <2.0              <2.0 mg/dL          Nitrite, Urine              NEGATIVE          NEGATIVE            Leukocyte Esterase, Ur*     NEGATIVE          NEGATIVE       -Troponin, High Sensitivity, 1 Hour:        Result                      Value             Ref Range           Troponin I, High Sensi*     32 (H)            0 - 13 ng/L    -Light Blue Top:        Result                      Value             Ref Range           Extra Tube                                                    Hold for add-ons.  -Lavender Top:        Result                      Value             Ref Range           Extra Tube                                                    Hold for add-ons.  -Lactate:        Result                      Value             Ref Range           Lactate                     2.1 (H)           0.4 - 2.0 mm*  -Lactate:        Result                      Value             Ref Range           Lactate                     0.7               0.4 - 2.0 mm*  -POCT GLUCOSE:        Result                      Value             Ref Range           POCT Glucose                54 (L)            74 - 99 mg/dL  -POCT GLUCOSE:        Result                      Value             Ref Range           POCT Glucose                160 (H)           74 - 99 mg/dL  -POCT GLUCOSE:        Result                      Value             Ref Range           POCT Glucose                81                74 - 99 mg/dL  -POCT GLUCOSE:         Result                      Value             Ref Range           POCT Glucose                91                74 - 99 mg/dL  -Urinalysis Microscopic:        Result                      Value             Ref Range           WBC, Urine                  NONE              1-5, NONE /H*       RBC, Urine                  NONE              NONE, 1-2, 3*     CT chest abdomen pelvis wo IV contrast   Final Result    1. Cardiomegaly with coronary artery disease.    2. Colonic diverticulosis is present without evidence of acute    diverticulitis.    3. Nonobstructing right-sided nephrolithiasis.    Signed by Claudio Mehta II, MD     CT head wo IV contrast   Final Result    1. No acute intracranial hemorrhage or infarction is evident. If    there remains concern for acute cerebral insult, MRI is recommended.    2. Mucosal thickening in the sinuses with air-fluid level in the    right sphenoid sinus as seen with acute sinusitis.          Signed by: Eugene Larsen 1/14/2024 4:34 PM    Dictation workstation:   [id number]     XR chest 1 view   Final Result    No evidence consolidating infiltrate or effusion.    Signed by Can Lambert MD     Patient was hypotensive on arrival, with blood pressures in the 70s to 80s systolic.  Patient was fluid responsive after the 30 mL/kg IV fluid bolus.  Reperfusion exam performed at 1640 on 1/14/2024.  Her blood pressure has improved with a MAP close to 65.  Her mentation remains unchanged.    No leukocytosis.  She has anemia of 8.2, stable compared to prior lab work.  Magnesium is decreased at 1.50, and repleted with 2 g IV.  Her glucose is noted to be 66, she is given juice orally.  Repeat blood glucose still remains low, she will she is given an amp of D50 via IV, with increase into the 160s.  Troponin is elevated but stable at 32, 27.  EKG nonischemic.  Lactate elevated 3.4, down trended to 2.1 after IV fluids.  Patient was hyponatremic 148, so her IV fluids were switched from normal saline  to lactated Ringer's.  Creatinine is elevated 2.24, slightly higher than most recent creatinine of 1.9.  Urinalysis is negative for signs of infection.  Chest x-ray unremarkable.  CT head negative for any acute findings.  Patient is started on empiric antibiotics pending CT of the chest, abdomen, pelvis, which was added on as she does have diffuse abdominal pain and no clear source of infection from her urinalysis and chest x-ray.  This returned as negative for any acute findings or signs of infection that could explain her vital signs.  However, she is showing signs of sepsis.  We will admit the patient for further evaluation and management.  This was discussed with teaching service.  They are familiar with the patient and she has history of adrenal insufficiency.  They requested that we order Solu-Cortef 100 mg IV, which was done.  Further management will be deferred to medicine team.    Jaziel Tyler DO, PGY 3  Emergency Medicine Resident    Amount and/or Complexity of Data Reviewed  Labs: ordered. Decision-making details documented in ED Course.  Radiology: ordered and independent interpretation performed. Decision-making details documented in ED Course.  ECG/medicine tests: ordered and independent interpretation performed. Decision-making details documented in ED Course.     Details: EKG at 1413 on 1/14/2024 as interpreted by myself: Ventricular rate 105, , QTc 459.  Atrial fibrillation.  No acute injury pattern.  T wave inversions noted in leads III, V5, V6.  These changes were seen on EKG dated 11/20/2023.        Procedure  Critical Care    Performed by: Jaziel Tyler DO  Authorized by: Peter Benson DO    Critical care provider statement:     Critical care time (minutes):  50    Critical care was necessary to treat or prevent imminent or life-threatening deterioration of the following conditions:  Circulatory failure, shock and sepsis    Critical care was time spent personally by me on the  following activities:  Development of treatment plan with patient or surrogate, discussions with primary provider, evaluation of patient's response to treatment, examination of patient, obtaining history from patient or surrogate, ordering and review of laboratory studies, ordering and review of radiographic studies, pulse oximetry, re-evaluation of patient's condition and review of old charts    I assumed direction of critical care for this patient from another provider in my specialty: no      Care discussed with: admitting provider         Jaziel Tyler DO  Resident  01/15/24 0843       Jaziel Tyler DO  Resident  01/15/24 0846

## 2024-01-15 ENCOUNTER — APPOINTMENT (OUTPATIENT)
Dept: RADIOLOGY | Facility: HOSPITAL | Age: 64
DRG: 643 | End: 2024-01-15
Payer: MEDICARE

## 2024-01-15 ENCOUNTER — APPOINTMENT (OUTPATIENT)
Dept: CARDIOLOGY | Facility: HOSPITAL | Age: 64
DRG: 643 | End: 2024-01-15
Payer: MEDICARE

## 2024-01-15 PROBLEM — I95.9 HYPOTENSION: Status: ACTIVE | Noted: 2024-01-15

## 2024-01-15 LAB
ABO GROUP (TYPE) IN BLOOD: NORMAL
ALBUMIN SERPL BCP-MCNC: 2.7 G/DL (ref 3.4–5)
ALP SERPL-CCNC: 74 U/L (ref 33–136)
ALT SERPL W P-5'-P-CCNC: 33 U/L (ref 7–45)
ANION GAP SERPL CALC-SCNC: 12 MMOL/L (ref 10–20)
ANTIBODY SCREEN: NORMAL
AST SERPL W P-5'-P-CCNC: 33 U/L (ref 9–39)
BASOPHILS # BLD MANUAL: 0 X10*3/UL (ref 0–0.1)
BASOPHILS NFR BLD MANUAL: 0 %
BILIRUB SERPL-MCNC: 0.3 MG/DL (ref 0–1.2)
BUN SERPL-MCNC: 39 MG/DL (ref 6–23)
BURR CELLS BLD QL SMEAR: ABNORMAL
CALCIUM SERPL-MCNC: 7.8 MG/DL (ref 8.6–10.3)
CHLORIDE SERPL-SCNC: 117 MMOL/L (ref 98–107)
CK SERPL-CCNC: 67 U/L (ref 0–215)
CO2 SERPL-SCNC: 22 MMOL/L (ref 21–32)
CREAT SERPL-MCNC: 1.59 MG/DL (ref 0.5–1.05)
CRP SERPL-MCNC: 2.52 MG/DL
DACRYOCYTES BLD QL SMEAR: ABNORMAL
EGFRCR SERPLBLD CKD-EPI 2021: 37 ML/MIN/1.73M*2
EOSINOPHIL # BLD MANUAL: 0 X10*3/UL (ref 0–0.7)
EOSINOPHIL NFR BLD MANUAL: 0 %
ERYTHROCYTE [DISTWIDTH] IN BLOOD BY AUTOMATED COUNT: 20.2 % (ref 11.5–14.5)
ERYTHROCYTE [SEDIMENTATION RATE] IN BLOOD BY WESTERGREN METHOD: 34 MM/H (ref 0–30)
GLUCOSE BLD MANUAL STRIP-MCNC: 150 MG/DL (ref 74–99)
GLUCOSE BLD MANUAL STRIP-MCNC: 94 MG/DL (ref 74–99)
GLUCOSE SERPL-MCNC: 135 MG/DL (ref 74–99)
HCT VFR BLD AUTO: 23.9 % (ref 36–46)
HCT VFR BLD AUTO: 24.1 % (ref 36–46)
HGB BLD-MCNC: 7 G/DL (ref 12–16)
HGB BLD-MCNC: 7.1 G/DL (ref 12–16)
HOLD SPECIMEN: NORMAL
HOLD SPECIMEN: NORMAL
IMM GRANULOCYTES # BLD AUTO: 0.03 X10*3/UL (ref 0–0.7)
IMM GRANULOCYTES NFR BLD AUTO: 0.8 % (ref 0–0.9)
LYMPHOCYTES # BLD MANUAL: 0.27 X10*3/UL (ref 1.2–4.8)
LYMPHOCYTES NFR BLD MANUAL: 7 %
MAGNESIUM SERPL-MCNC: 2.07 MG/DL (ref 1.6–2.4)
MCH RBC QN AUTO: 29.8 PG (ref 26–34)
MCHC RBC AUTO-ENTMCNC: 29 G/DL (ref 32–36)
MCV RBC AUTO: 103 FL (ref 80–100)
METAMYELOCYTES # BLD MANUAL: 0.04 X10*3/UL
METAMYELOCYTES NFR BLD MANUAL: 1 %
MONOCYTES # BLD MANUAL: 0 X10*3/UL (ref 0.1–1)
MONOCYTES NFR BLD MANUAL: 0 %
NEUTS SEG # BLD MANUAL: 3.5 X10*3/UL (ref 1.2–7)
NEUTS SEG NFR BLD MANUAL: 92 %
NRBC BLD-RTO: 0.8 /100 WBCS (ref 0–0)
OVALOCYTES BLD QL SMEAR: ABNORMAL
PHOSPHATE SERPL-MCNC: 2.7 MG/DL (ref 2.5–4.9)
PLATELET # BLD AUTO: 69 X10*3/UL (ref 150–450)
POTASSIUM SERPL-SCNC: 4.4 MMOL/L (ref 3.5–5.3)
PROT SERPL-MCNC: 4.8 G/DL (ref 6.4–8.2)
RBC # BLD AUTO: 2.35 X10*6/UL (ref 4–5.2)
RBC MORPH BLD: ABNORMAL
RH FACTOR (ANTIGEN D): NORMAL
SODIUM SERPL-SCNC: 147 MMOL/L (ref 136–145)
TOTAL CELLS COUNTED BLD: 100
WBC # BLD AUTO: 3.8 X10*3/UL (ref 4.4–11.3)

## 2024-01-15 PROCEDURE — 86920 COMPATIBILITY TEST SPIN: CPT

## 2024-01-15 PROCEDURE — 36415 COLL VENOUS BLD VENIPUNCTURE: CPT

## 2024-01-15 PROCEDURE — 2500000004 HC RX 250 GENERAL PHARMACY W/ HCPCS (ALT 636 FOR OP/ED)

## 2024-01-15 PROCEDURE — 85652 RBC SED RATE AUTOMATED: CPT

## 2024-01-15 PROCEDURE — 70553 MRI BRAIN STEM W/O & W/DYE: CPT | Performed by: RADIOLOGY

## 2024-01-15 PROCEDURE — 2060000001 HC INTERMEDIATE ICU ROOM DAILY

## 2024-01-15 PROCEDURE — 93970 EXTREMITY STUDY: CPT | Performed by: STUDENT IN AN ORGANIZED HEALTH CARE EDUCATION/TRAINING PROGRAM

## 2024-01-15 PROCEDURE — 93970 EXTREMITY STUDY: CPT

## 2024-01-15 PROCEDURE — 80053 COMPREHEN METABOLIC PANEL: CPT

## 2024-01-15 PROCEDURE — 86140 C-REACTIVE PROTEIN: CPT

## 2024-01-15 PROCEDURE — A9575 INJ GADOTERATE MEGLUMI 0.1ML: HCPCS

## 2024-01-15 PROCEDURE — 86901 BLOOD TYPING SEROLOGIC RH(D): CPT

## 2024-01-15 PROCEDURE — 83735 ASSAY OF MAGNESIUM: CPT

## 2024-01-15 PROCEDURE — 2500000001 HC RX 250 WO HCPCS SELF ADMINISTERED DRUGS (ALT 637 FOR MEDICARE OP)

## 2024-01-15 PROCEDURE — 82947 ASSAY GLUCOSE BLOOD QUANT: CPT

## 2024-01-15 PROCEDURE — 70553 MRI BRAIN STEM W/O & W/DYE: CPT

## 2024-01-15 PROCEDURE — 85027 COMPLETE CBC AUTOMATED: CPT

## 2024-01-15 PROCEDURE — 84100 ASSAY OF PHOSPHORUS: CPT

## 2024-01-15 PROCEDURE — 82550 ASSAY OF CK (CPK): CPT

## 2024-01-15 PROCEDURE — 85014 HEMATOCRIT: CPT

## 2024-01-15 PROCEDURE — 2550000001 HC RX 255 CONTRASTS

## 2024-01-15 PROCEDURE — 85007 BL SMEAR W/DIFF WBC COUNT: CPT

## 2024-01-15 PROCEDURE — 2500000004 HC RX 250 GENERAL PHARMACY W/ HCPCS (ALT 636 FOR OP/ED): Performed by: INTERNAL MEDICINE

## 2024-01-15 PROCEDURE — 99233 SBSQ HOSP IP/OBS HIGH 50: CPT | Performed by: STUDENT IN AN ORGANIZED HEALTH CARE EDUCATION/TRAINING PROGRAM

## 2024-01-15 RX ORDER — MYCOPHENOLATE MOFETIL 250 MG/1
1000 CAPSULE ORAL 2 TIMES DAILY
Status: DISCONTINUED | OUTPATIENT
Start: 2024-01-15 | End: 2024-01-17 | Stop reason: HOSPADM

## 2024-01-15 RX ORDER — ACETAMINOPHEN 325 MG/1
650 TABLET ORAL EVERY 6 HOURS PRN
Status: DISCONTINUED | OUTPATIENT
Start: 2024-01-15 | End: 2024-01-17 | Stop reason: HOSPADM

## 2024-01-15 RX ORDER — POLYETHYLENE GLYCOL 3350 17 G/17G
17 POWDER, FOR SOLUTION ORAL DAILY
Status: DISCONTINUED | OUTPATIENT
Start: 2024-01-15 | End: 2024-01-16

## 2024-01-15 RX ORDER — GADOTERATE MEGLUMINE 376.9 MG/ML
18 INJECTION INTRAVENOUS
Status: COMPLETED | OUTPATIENT
Start: 2024-01-15 | End: 2024-01-15

## 2024-01-15 RX ORDER — FOLIC ACID 1 MG/1
1 TABLET ORAL DAILY
Status: DISCONTINUED | OUTPATIENT
Start: 2024-01-15 | End: 2024-01-17 | Stop reason: HOSPADM

## 2024-01-15 RX ORDER — ATORVASTATIN CALCIUM 40 MG/1
40 TABLET, FILM COATED ORAL DAILY
Status: DISCONTINUED | OUTPATIENT
Start: 2024-01-15 | End: 2024-01-17 | Stop reason: HOSPADM

## 2024-01-15 RX ADMIN — MYCOPHENOLATE MOFETIL 1000 MG: 250 CAPSULE ORAL at 10:36

## 2024-01-15 RX ADMIN — GADOTERATE MEGLUMINE 18 ML: 376.9 INJECTION INTRAVENOUS at 16:42

## 2024-01-15 RX ADMIN — FOLIC ACID 1 MG: 1 TABLET ORAL at 09:43

## 2024-01-15 RX ADMIN — HYDROCORTISONE SODIUM SUCCINATE 75 MG: 100 INJECTION, POWDER, FOR SOLUTION INTRAMUSCULAR; INTRAVENOUS at 02:45

## 2024-01-15 RX ADMIN — HYDROCORTISONE SODIUM SUCCINATE 75 MG: 100 INJECTION, POWDER, FOR SOLUTION INTRAMUSCULAR; INTRAVENOUS at 14:55

## 2024-01-15 RX ADMIN — APIXABAN 2.5 MG: 2.5 TABLET, FILM COATED ORAL at 13:30

## 2024-01-15 RX ADMIN — HYDROCORTISONE SODIUM SUCCINATE 75 MG: 100 INJECTION, POWDER, FOR SOLUTION INTRAMUSCULAR; INTRAVENOUS at 08:50

## 2024-01-15 RX ADMIN — HYDROCORTISONE SODIUM SUCCINATE 75 MG: 100 INJECTION, POWDER, FOR SOLUTION INTRAMUSCULAR; INTRAVENOUS at 22:04

## 2024-01-15 RX ADMIN — ACETAMINOPHEN 650 MG: 325 TABLET ORAL at 13:30

## 2024-01-15 RX ADMIN — ATORVASTATIN CALCIUM 40 MG: 40 TABLET, FILM COATED ORAL at 09:43

## 2024-01-15 SDOH — ECONOMIC STABILITY: TRANSPORTATION INSECURITY
IN THE PAST 12 MONTHS, HAS THE LACK OF TRANSPORTATION KEPT YOU FROM MEDICAL APPOINTMENTS OR FROM GETTING MEDICATIONS?: NO

## 2024-01-15 SDOH — SOCIAL STABILITY: SOCIAL INSECURITY
WITHIN THE LAST YEAR, HAVE TO BEEN RAPED OR FORCED TO HAVE ANY KIND OF SEXUAL ACTIVITY BY YOUR PARTNER OR EX-PARTNER?: NO

## 2024-01-15 SDOH — SOCIAL STABILITY: SOCIAL INSECURITY: WITHIN THE LAST YEAR, HAVE YOU BEEN AFRAID OF YOUR PARTNER OR EX-PARTNER?: NO

## 2024-01-15 SDOH — SOCIAL STABILITY: SOCIAL NETWORK
IN A TYPICAL WEEK, HOW MANY TIMES DO YOU TALK ON THE PHONE WITH FAMILY, FRIENDS, OR NEIGHBORS?: MORE THAN THREE TIMES A WEEK

## 2024-01-15 SDOH — ECONOMIC STABILITY: FOOD INSECURITY: WITHIN THE PAST 12 MONTHS, YOU WORRIED THAT YOUR FOOD WOULD RUN OUT BEFORE YOU GOT MONEY TO BUY MORE.: NEVER TRUE

## 2024-01-15 SDOH — SOCIAL STABILITY: SOCIAL INSECURITY: WITHIN THE LAST YEAR, HAVE YOU BEEN HUMILIATED OR EMOTIONALLY ABUSED IN OTHER WAYS BY YOUR PARTNER OR EX-PARTNER?: NO

## 2024-01-15 SDOH — SOCIAL STABILITY: SOCIAL NETWORK: HOW OFTEN DO YOU ATTENT MEETINGS OF THE CLUB OR ORGANIZATION YOU BELONG TO?: PATIENT DECLINED

## 2024-01-15 SDOH — SOCIAL STABILITY: SOCIAL INSECURITY: ARE THERE ANY APPARENT SIGNS OF INJURIES/BEHAVIORS THAT COULD BE RELATED TO ABUSE/NEGLECT?: NO

## 2024-01-15 SDOH — ECONOMIC STABILITY: HOUSING INSECURITY
IN THE LAST 12 MONTHS, WAS THERE A TIME WHEN YOU DID NOT HAVE A STEADY PLACE TO SLEEP OR SLEPT IN A SHELTER (INCLUDING NOW)?: NO

## 2024-01-15 SDOH — SOCIAL STABILITY: SOCIAL INSECURITY: DO YOU FEEL ANYONE HAS EXPLOITED OR TAKEN ADVANTAGE OF YOU FINANCIALLY OR OF YOUR PERSONAL PROPERTY?: NO

## 2024-01-15 SDOH — HEALTH STABILITY: PHYSICAL HEALTH: ON AVERAGE, HOW MANY MINUTES DO YOU ENGAGE IN EXERCISE AT THIS LEVEL?: PATIENT DECLINED

## 2024-01-15 SDOH — SOCIAL STABILITY: SOCIAL INSECURITY: DOES ANYONE TRY TO KEEP YOU FROM HAVING/CONTACTING OTHER FRIENDS OR DOING THINGS OUTSIDE YOUR HOME?: NO

## 2024-01-15 SDOH — SOCIAL STABILITY: SOCIAL NETWORK
DO YOU BELONG TO ANY CLUBS OR ORGANIZATIONS SUCH AS CHURCH GROUPS UNIONS, FRATERNAL OR ATHLETIC GROUPS, OR SCHOOL GROUPS?: PATIENT DECLINED

## 2024-01-15 SDOH — ECONOMIC STABILITY: INCOME INSECURITY: HOW HARD IS IT FOR YOU TO PAY FOR THE VERY BASICS LIKE FOOD, HOUSING, MEDICAL CARE, AND HEATING?: NOT HARD AT ALL

## 2024-01-15 SDOH — HEALTH STABILITY: MENTAL HEALTH
STRESS IS WHEN SOMEONE FEELS TENSE, NERVOUS, ANXIOUS, OR CAN'T SLEEP AT NIGHT BECAUSE THEIR MIND IS TROUBLED. HOW STRESSED ARE YOU?: NOT AT ALL

## 2024-01-15 SDOH — ECONOMIC STABILITY: INCOME INSECURITY: IN THE LAST 12 MONTHS, WAS THERE A TIME WHEN YOU WERE NOT ABLE TO PAY THE MORTGAGE OR RENT ON TIME?: NO

## 2024-01-15 SDOH — SOCIAL STABILITY: SOCIAL NETWORK: HOW OFTEN DO YOU GET TOGETHER WITH FRIENDS OR RELATIVES?: MORE THAN THREE TIMES A WEEK

## 2024-01-15 SDOH — SOCIAL STABILITY: SOCIAL INSECURITY: DO YOU FEEL UNSAFE GOING BACK TO THE PLACE WHERE YOU ARE LIVING?: NO

## 2024-01-15 SDOH — SOCIAL STABILITY: SOCIAL INSECURITY
WITHIN THE LAST YEAR, HAVE YOU BEEN KICKED, HIT, SLAPPED, OR OTHERWISE PHYSICALLY HURT BY YOUR PARTNER OR EX-PARTNER?: NO

## 2024-01-15 SDOH — ECONOMIC STABILITY: TRANSPORTATION INSECURITY
IN THE PAST 12 MONTHS, HAS LACK OF TRANSPORTATION KEPT YOU FROM MEETINGS, WORK, OR FROM GETTING THINGS NEEDED FOR DAILY LIVING?: NO

## 2024-01-15 SDOH — ECONOMIC STABILITY: FOOD INSECURITY: WITHIN THE PAST 12 MONTHS, THE FOOD YOU BOUGHT JUST DIDN'T LAST AND YOU DIDN'T HAVE MONEY TO GET MORE.: NEVER TRUE

## 2024-01-15 SDOH — SOCIAL STABILITY: SOCIAL INSECURITY: HAVE YOU HAD THOUGHTS OF HARMING ANYONE ELSE?: NO

## 2024-01-15 SDOH — SOCIAL STABILITY: SOCIAL NETWORK: ARE YOU MARRIED, WIDOWED, DIVORCED, SEPARATED, NEVER MARRIED, OR LIVING WITH A PARTNER?: PATIENT UNABLE TO ANSWER

## 2024-01-15 SDOH — SOCIAL STABILITY: SOCIAL NETWORK: HOW OFTEN DO YOU ATTEND CHURCH OR RELIGIOUS SERVICES?: PATIENT DECLINED

## 2024-01-15 SDOH — SOCIAL STABILITY: SOCIAL INSECURITY: HAS ANYONE EVER THREATENED TO HURT YOUR FAMILY OR YOUR PETS?: NO

## 2024-01-15 SDOH — HEALTH STABILITY: PHYSICAL HEALTH
ON AVERAGE, HOW MANY DAYS PER WEEK DO YOU ENGAGE IN MODERATE TO STRENUOUS EXERCISE (LIKE A BRISK WALK)?: PATIENT DECLINED

## 2024-01-15 SDOH — SOCIAL STABILITY: SOCIAL INSECURITY: ARE YOU OR HAVE YOU BEEN THREATENED OR ABUSED PHYSICALLY, EMOTIONALLY, OR SEXUALLY BY ANYONE?: NO

## 2024-01-15 SDOH — SOCIAL STABILITY: SOCIAL INSECURITY: WERE YOU ABLE TO COMPLETE ALL THE BEHAVIORAL HEALTH SCREENINGS?: YES

## 2024-01-15 SDOH — SOCIAL STABILITY: SOCIAL INSECURITY: ABUSE: ADULT

## 2024-01-15 ASSESSMENT — COGNITIVE AND FUNCTIONAL STATUS - GENERAL
TURNING FROM BACK TO SIDE WHILE IN FLAT BAD: A LOT
MOBILITY SCORE: 10
HELP NEEDED FOR BATHING: A LOT
DRESSING REGULAR LOWER BODY CLOTHING: A LOT
DRESSING REGULAR UPPER BODY CLOTHING: A LOT
DAILY ACTIVITIY SCORE: 12
MOVING TO AND FROM BED TO CHAIR: A LOT
STANDING UP FROM CHAIR USING ARMS: A LOT
EATING MEALS: A LOT
CLIMB 3 TO 5 STEPS WITH RAILING: TOTAL
PATIENT BASELINE BEDBOUND: YES
WALKING IN HOSPITAL ROOM: TOTAL
TOILETING: A LOT
PERSONAL GROOMING: A LOT
MOVING FROM LYING ON BACK TO SITTING ON SIDE OF FLAT BED WITH BEDRAILS: A LOT

## 2024-01-15 ASSESSMENT — LIFESTYLE VARIABLES
AUDIT-C TOTAL SCORE: 0
SKIP TO QUESTIONS 9-10: 1
SUBSTANCE_ABUSE_PAST_12_MONTHS: NO
HOW MANY STANDARD DRINKS CONTAINING ALCOHOL DO YOU HAVE ON A TYPICAL DAY: PATIENT DOES NOT DRINK
HOW OFTEN DO YOU HAVE 6 OR MORE DRINKS ON ONE OCCASION: NEVER
PRESCIPTION_ABUSE_PAST_12_MONTHS: NO
HOW OFTEN DO YOU HAVE A DRINK CONTAINING ALCOHOL: NEVER
AUDIT-C TOTAL SCORE: 0

## 2024-01-15 ASSESSMENT — PAIN SCALES - GENERAL
PAINLEVEL_OUTOF10: 6
PAINLEVEL_OUTOF10: 0 - NO PAIN

## 2024-01-15 ASSESSMENT — ACTIVITIES OF DAILY LIVING (ADL)
PATIENT'S MEMORY ADEQUATE TO SAFELY COMPLETE DAILY ACTIVITIES?: YES
GROOMING: DEPENDENT
ADEQUATE_TO_COMPLETE_ADL: YES
TOILETING: DEPENDENT
HEARING - LEFT EAR: FUNCTIONAL
HEARING - RIGHT EAR: FUNCTIONAL
DRESSING YOURSELF: DEPENDENT
LACK_OF_TRANSPORTATION: NO
BATHING: DEPENDENT
JUDGMENT_ADEQUATE_SAFELY_COMPLETE_DAILY_ACTIVITIES: YES
WALKS IN HOME: DEPENDENT
FEEDING YOURSELF: DEPENDENT

## 2024-01-15 ASSESSMENT — ENCOUNTER SYMPTOMS
HEADACHES: 0
ABDOMINAL PAIN: 1

## 2024-01-15 ASSESSMENT — PAIN - FUNCTIONAL ASSESSMENT
PAIN_FUNCTIONAL_ASSESSMENT: 0-10

## 2024-01-15 ASSESSMENT — PATIENT HEALTH QUESTIONNAIRE - PHQ9
1. LITTLE INTEREST OR PLEASURE IN DOING THINGS: NOT AT ALL
2. FEELING DOWN, DEPRESSED OR HOPELESS: NOT AT ALL
SUM OF ALL RESPONSES TO PHQ9 QUESTIONS 1 & 2: 0

## 2024-01-15 NOTE — CARE PLAN
The patient's goals for the shift include patient bp will be stable and mental status improved    The clinical goals for the shift include patient will be fall free and maintain hemodynamics      Problem: Skin  Goal: Decreased wound size/increased tissue granulation at next dressing change  Outcome: Progressing  Flowsheets (Taken 1/15/2024 0224)  Decreased wound size/increased tissue granulation at next dressing change:   Promote sleep for wound healing   Protective dressings over bony prominences  Goal: Participates in plan/prevention/treatment measures  Outcome: Progressing  Flowsheets (Taken 1/15/2024 0224)  Participates in plan/prevention/treatment measures:   Elevate heels   Discuss with provider PT/OT consult  Goal: Prevent/manage excess moisture  Outcome: Progressing  Flowsheets (Taken 1/15/2024 0224)  Prevent/manage excess moisture: Cleanse incontinence/protect with barrier cream  Goal: Prevent/minimize sheer/friction injuries  Outcome: Progressing  Flowsheets (Taken 1/15/2024 0224)  Prevent/minimize sheer/friction injuries:   Use pull sheet   HOB 30 degrees or less  Goal: Promote/optimize nutrition  Outcome: Progressing  Flowsheets (Taken 1/15/2024 0224)  Promote/optimize nutrition:   Assist with feeding   Consume > 50% meals/supplements  Goal: Promote skin healing  Outcome: Progressing  Flowsheets (Taken 1/15/2024 0224)  Promote skin healing: Assess skin/pad under line(s)/device(s)     Problem: Pain - Adult  Goal: Verbalizes/displays adequate comfort level or baseline comfort level  Outcome: Progressing     Problem: Safety - Adult  Goal: Free from fall injury  Outcome: Progressing     Problem: Discharge Planning  Goal: Discharge to home or other facility with appropriate resources  Outcome: Progressing     Problem: Chronic Conditions and Co-morbidities  Goal: Patient's chronic conditions and co-morbidity symptoms are monitored and maintained or improved  Outcome: Progressing     Problem: Skin  Goal:  Decreased wound size/increased tissue granulation at next dressing change  Outcome: Progressing  Flowsheets (Taken 1/15/2024 0224)  Decreased wound size/increased tissue granulation at next dressing change:   Promote sleep for wound healing   Protective dressings over bony prominences  Goal: Participates in plan/prevention/treatment measures  Outcome: Progressing  Flowsheets (Taken 1/15/2024 0224)  Participates in plan/prevention/treatment measures:   Elevate heels   Discuss with provider PT/OT consult  Goal: Prevent/manage excess moisture  Outcome: Progressing  Flowsheets (Taken 1/15/2024 0224)  Prevent/manage excess moisture: Cleanse incontinence/protect with barrier cream  Goal: Prevent/minimize sheer/friction injuries  Outcome: Progressing  Flowsheets (Taken 1/15/2024 0224)  Prevent/minimize sheer/friction injuries:   Use pull sheet   HOB 30 degrees or less  Goal: Promote/optimize nutrition  Outcome: Progressing  Flowsheets (Taken 1/15/2024 0224)  Promote/optimize nutrition:   Assist with feeding   Consume > 50% meals/supplements  Goal: Promote skin healing  Outcome: Progressing  Flowsheets (Taken 1/15/2024 0224)  Promote skin healing: Assess skin/pad under line(s)/device(s)     Problem: Pain - Adult  Goal: Verbalizes/displays adequate comfort level or baseline comfort level  Outcome: Progressing     Problem: Safety - Adult  Goal: Free from fall injury  Outcome: Progressing     Problem: Discharge Planning  Goal: Discharge to home or other facility with appropriate resources  Outcome: Progressing     Problem: Chronic Conditions and Co-morbidities  Goal: Patient's chronic conditions and co-morbidity symptoms are monitored and maintained or improved  Outcome: Progressing

## 2024-01-15 NOTE — PROGRESS NOTES
Bing Holliday is a 63 y.o. female on day 0 of admission presenting with Adrenal insufficiency (CMS/HCC).      Subjective   Patient was seen and evaluated at bedside this morning. She is in no acute distress and is A&O x0. Her orientation status appears to change with each person evaluating her throughout the morning, as some evaluated her to be A&Ox2 (person and place). Patient's son was updated over the phone. She is complaining of diffuse abdominal and b/l arm and leg pain to superficial palpation. Son tells me that he drives to obtain medications for patient 2x/month. States that she takes her medications independently mostly. Has been going to the bathroom upwards of 3-5 times daily, however son does not know if she is having a bowel movement or urinating.  States that she began having episodes of confusion on Friday, and on Saturday began complaining of diffuse back pain.  Endorses no nausea or vomiting or other GI upset.  ROS difficult to obtain secondary to patient's mentation this morning.       Objective     Last Recorded Vitals  /67   Pulse 66   Temp 35.9 °C (96.6 °F) (Temporal)   Resp 14   Wt 93 kg (205 lb 0.4 oz)   SpO2 92%   Intake/Output last 3 Shifts:    Intake/Output Summary (Last 24 hours) at 1/15/2024 1223  Last data filed at 1/15/2024 0600  Gross per 24 hour   Intake 400 ml   Output 350 ml   Net 50 ml       Admission Weight  Weight: 93 kg (205 lb 0.4 oz) (01/14/24 1410)    Daily Weight  01/15/24 : 93 kg (205 lb 0.4 oz)    Image Results  ECG 12 lead  Atrial fibrillation with rapid ventricular response  ST & T wave abnormality, consider inferior ischemia or digitalis effect  Abnormal ECG  When compared with ECG of 17-DEC-2023 03:26,  Atrial fibrillation has replaced Sinus rhythm  Vent. rate has increased BY  57 BPM  ST now depressed in Inferior leads  ST now depressed in Anterior leads  T wave inversion now evident in Lateral leads  QT has lengthened  ECG 12 lead  Atrial  fibrillation  Moderate voltage criteria for LVH, may be normal variant  ST & T wave abnormality, consider inferolateral ischemia or digitalis effect  Prolonged QT  Abnormal ECG  When compared with ECG of 14-JAN-2024 14:13, (unconfirmed)  ST elevation now present in Lateral leads  Vascular US Lower Extremity Venous Duplex Bilateral  Preliminary Cardiology Report                 [facility]  [address] [place] [address]      Tel [phone] Fax [phone]               Preliminary Vascular Lab Report     [facility] US LOWER EXTREMITY VENOUS DUPLEX BILATERAL       Patient Name:     LISBET ZARAGOZA NENITA Reading Physician:  [id number] Mena Arellano MD  Study Date:       1/15/2024        Ordering Provider:  [id number] HERIBERTO ROMERO  MRN/PID:          [id number]         Fellow:  Accession#:       [id number]     Technologist:       Ginna Travis RVT  YOB: 1960       Technologist 2:  Gender:           F                Encounter#:         [id number]  Admission Status: Inpatient        Location Performed: [facility]       Diagnosis/ICD: Localized swelling, mass and lump, neck-R22.1  Indication:    Limb swelling  CPT Codes:     28828 Peripheral venous duplex scan for DVT complete       Pertinent History: HTN, Hyperlipidemia, CVA and Anticoagulation. Afib, DM.       PRELIMINARY CONCLUSIONS:     Right Lower Venous: No evidence of acute deep vein thrombus visualized in the right lower extremity. Cannot rule out thrombus in non-visualized peroneal vein due to swelling and edema.  Left Lower Venous: No evidence of acute deep vein thrombus visualized in the left lower extremity. Cannot rule out thrombus in non-visualized posterior tibial and peroneal veins due to swelling and edema.  Additional Findings:  Images are limited due to patient inability to tolerate touch and  compressions  to LE.       Imaging & Doppler Findings:     Right                 Compressible Thrombus        Flow  Distal External Iliac     Yes        None   Spontaneous/Phasic  CFV                       Yes        None   Spontaneous/Phasic  PFV                       Yes        None  FV Proximal               Yes        None   Spontaneous/Phasic  FV Mid                    Yes        None  FV Distal                 Yes        None  Popliteal                 Yes        None   Spontaneous/Phasic  PTV                       Yes        None       Left                  Compress Thrombus        Flow  Distal External Iliac   Yes      None   Spontaneous/Phasic  CFV                     Yes      None   Spontaneous/Phasic  PFV                     Yes      None  FV Proximal             Yes      None   Spontaneous/Phasic  FV Mid                  Yes      None  FV Distal               Yes      None  Popliteal               Yes      None   Spontaneous/Phasic    VASCULAR PRELIMINARY REPORT  completed by Ginna Travis RVT on 1/15/2024 at 11:31:32 AM       ** Final **      Physical Exam  Constitutional:       General: She is awake. She is not in acute distress.     Appearance: Normal appearance. She is well-developed.   HENT:      Head: Normocephalic and atraumatic.      Nose: Nose normal.      Mouth/Throat:      Mouth: Mucous membranes are dry.   Eyes:      Conjunctiva/sclera: Conjunctivae normal.      Pupils: Pupils are equal, round, and reactive to light.   Cardiovascular:      Rate and Rhythm: Rhythm irregular.      Heart sounds: Normal heart sounds.   Pulmonary:      Effort: Pulmonary effort is normal.      Breath sounds: Normal breath sounds.   Abdominal:      General: Abdomen is flat. Bowel sounds are normal.      Palpations: Abdomen is soft.      Tenderness: There is generalized abdominal tenderness.   Musculoskeletal:      Cervical back: Normal range of motion and neck supple.      Right lower leg: Swelling and tenderness  present.      Left lower leg: Swelling and tenderness present.   Skin:     General: Skin is warm and dry.   Neurological:      General: No focal deficit present.      Mental Status: She is alert. She is confused.      Comments: A&O x 2       Relevant Results   Scheduled medications  apixaban, 2.5 mg, oral, BID  atorvastatin, 40 mg, oral, Daily  folic acid, 1 mg, oral, Daily  hydrocortisone sodium succinate, 75 mg, intravenous, q6h  mycophenolate, 1,000 mg, oral, BID  polyethylene glycol, 17 g, oral, Daily      Continuous medications     PRN medications  PRN medications: acetaminophen     Results for orders placed or performed during the hospital encounter of 01/14/24 (from the past 24 hour(s))   ECG 12 lead   Result Value Ref Range    Ventricular Rate 105 BPM    Atrial Rate 208 BPM    QRS Duration 100 ms    QT Interval 348 ms    QTC Calculation(Bazett) 459 ms    R Axis 16 degrees    T Axis 224 degrees    QRS Count 17 beats    Q Onset 215 ms    T Offset 389 ms    QTC Fredericia 419 ms   CBC and Auto Differential   Result Value Ref Range    WBC 4.9 4.4 - 11.3 x10*3/uL    nRBC 1.0 (H) 0.0 - 0.0 /100 WBCs    RBC 2.74 (L) 4.00 - 5.20 x10*6/uL    Hemoglobin 8.2 (L) 12.0 - 16.0 g/dL    Hematocrit 27.7 (L) 36.0 - 46.0 %     (H) 80 - 100 fL    MCH 29.9 26.0 - 34.0 pg    MCHC 29.6 (L) 32.0 - 36.0 g/dL    RDW 20.0 (H) 11.5 - 14.5 %    Platelets 101 (L) 150 - 450 x10*3/uL    Neutrophils % 82.6 40.0 - 80.0 %    Immature Granulocytes %, Automated 0.4 0.0 - 0.9 %    Lymphocytes % 13.6 13.0 - 44.0 %    Monocytes % 3.0 2.0 - 10.0 %    Eosinophils % 0.2 0.0 - 6.0 %    Basophils % 0.2 0.0 - 2.0 %    Neutrophils Absolute 4.08 1.20 - 7.70 x10*3/uL    Immature Granulocytes Absolute, Automated 0.02 0.00 - 0.70 x10*3/uL    Lymphocytes Absolute 0.67 (L) 1.20 - 4.80 x10*3/uL    Monocytes Absolute 0.15 0.10 - 1.00 x10*3/uL    Eosinophils Absolute 0.01 0.00 - 0.70 x10*3/uL    Basophils Absolute 0.01 0.00 - 0.10 x10*3/uL   Comprehensive  Metabolic Panel   Result Value Ref Range    Glucose 66 (L) 74 - 99 mg/dL    Sodium 148 (H) 136 - 145 mmol/L    Potassium 4.0 3.5 - 5.3 mmol/L    Chloride 110 (H) 98 - 107 mmol/L    Bicarbonate 27 21 - 32 mmol/L    Anion Gap 15 10 - 20 mmol/L    Urea Nitrogen 51 (H) 6 - 23 mg/dL    Creatinine 2.24 (H) 0.50 - 1.05 mg/dL    eGFR 24 (L) >60 mL/min/1.73m*2    Calcium 8.1 (L) 8.6 - 10.3 mg/dL    Albumin 3.2 (L) 3.4 - 5.0 g/dL    Alkaline Phosphatase 96 33 - 136 U/L    Total Protein 5.2 (L) 6.4 - 8.2 g/dL    AST 33 9 - 39 U/L    Bilirubin, Total 0.2 0.0 - 1.2 mg/dL    ALT 38 7 - 45 U/L   Magnesium   Result Value Ref Range    Magnesium 1.50 (L) 1.60 - 2.40 mg/dL   Troponin I, High Sensitivity, Initial   Result Value Ref Range    Troponin I, High Sensitivity 27 (H) 0 - 13 ng/L   Lactate   Result Value Ref Range    Lactate 3.4 (H) 0.4 - 2.0 mmol/L   Light Blue Top   Result Value Ref Range    Extra Tube Hold for add-ons.    Lavender Top   Result Value Ref Range    Extra Tube Hold for add-ons.    Sars-CoV-2 and Influenza A/B PCR   Result Value Ref Range    Flu A Result Not Detected Not Detected    Flu B Result Not Detected Not Detected    Coronavirus 2019, PCR Not Detected Not Detected   POCT GLUCOSE   Result Value Ref Range    POCT Glucose 54 (L) 74 - 99 mg/dL   Urinalysis with Reflex Culture and Microscopic   Result Value Ref Range    Color, Urine Yellow Straw, Yellow    Appearance, Urine Hazy (N) Clear    Specific Gravity, Urine 1.015 1.005 - 1.035    pH, Urine 5.0 5.0, 5.5, 6.0, 6.5, 7.0, 7.5, 8.0    Protein, Urine 100 (2+) (N) NEGATIVE mg/dL    Glucose, Urine NEGATIVE NEGATIVE mg/dL    Blood, Urine NEGATIVE NEGATIVE    Ketones, Urine NEGATIVE NEGATIVE mg/dL    Bilirubin, Urine NEGATIVE NEGATIVE    Urobilinogen, Urine <2.0 <2.0 mg/dL    Nitrite, Urine NEGATIVE NEGATIVE    Leukocyte Esterase, Urine NEGATIVE NEGATIVE   Extra Urine Gray Tube   Result Value Ref Range    Extra Tube Hold for add-ons.    Lactate   Result Value Ref  Range    Lactate 2.1 (H) 0.4 - 2.0 mmol/L   Urinalysis Microscopic   Result Value Ref Range    WBC, Urine NONE 1-5, NONE /HPF    RBC, Urine NONE NONE, 1-2, 3-5 /HPF   Troponin, High Sensitivity, 1 Hour   Result Value Ref Range    Troponin I, High Sensitivity 32 (H) 0 - 13 ng/L   ECG 12 lead   Result Value Ref Range    Ventricular Rate 86 BPM    Atrial Rate 117 BPM    QRS Duration 94 ms    QT Interval 400 ms    QTC Calculation(Bazett) 478 ms    R Axis 3 degrees    T Axis -65 degrees    QRS Count 14 beats    Q Onset 216 ms    T Offset 416 ms    QTC Fredericia 451 ms   POCT GLUCOSE   Result Value Ref Range    POCT Glucose 160 (H) 74 - 99 mg/dL   POCT GLUCOSE   Result Value Ref Range    POCT Glucose 81 74 - 99 mg/dL   Lactate   Result Value Ref Range    Lactate 0.7 0.4 - 2.0 mmol/L   POCT GLUCOSE   Result Value Ref Range    POCT Glucose 91 74 - 99 mg/dL   POCT GLUCOSE   Result Value Ref Range    POCT Glucose 150 (H) 74 - 99 mg/dL   Lavender Top   Result Value Ref Range    Extra Tube Hold for add-ons.    CBC and Auto Differential   Result Value Ref Range    WBC 3.8 (L) 4.4 - 11.3 x10*3/uL    nRBC 0.8 (H) 0.0 - 0.0 /100 WBCs    RBC 2.35 (L) 4.00 - 5.20 x10*6/uL    Hemoglobin 7.0 (L) 12.0 - 16.0 g/dL    Hematocrit 24.1 (L) 36.0 - 46.0 %     (H) 80 - 100 fL    MCH 29.8 26.0 - 34.0 pg    MCHC 29.0 (L) 32.0 - 36.0 g/dL    RDW 20.2 (H) 11.5 - 14.5 %    Platelets 69 (L) 150 - 450 x10*3/uL    Immature Granulocytes %, Automated 0.8 0.0 - 0.9 %    Immature Granulocytes Absolute, Automated 0.03 0.00 - 0.70 x10*3/uL   Comprehensive metabolic panel   Result Value Ref Range    Glucose 135 (H) 74 - 99 mg/dL    Sodium 147 (H) 136 - 145 mmol/L    Potassium 4.4 3.5 - 5.3 mmol/L    Chloride 117 (H) 98 - 107 mmol/L    Bicarbonate 22 21 - 32 mmol/L    Anion Gap 12 10 - 20 mmol/L    Urea Nitrogen 39 (H) 6 - 23 mg/dL    Creatinine 1.59 (H) 0.50 - 1.05 mg/dL    eGFR 37 (L) >60 mL/min/1.73m*2    Calcium 7.8 (L) 8.6 - 10.3 mg/dL    Albumin  2.7 (L) 3.4 - 5.0 g/dL    Alkaline Phosphatase 74 33 - 136 U/L    Total Protein 4.8 (L) 6.4 - 8.2 g/dL    AST 33 9 - 39 U/L    Bilirubin, Total 0.3 0.0 - 1.2 mg/dL    ALT 33 7 - 45 U/L   Magnesium   Result Value Ref Range    Magnesium 2.07 1.60 - 2.40 mg/dL   Phosphorus   Result Value Ref Range    Phosphorus 2.7 2.5 - 4.9 mg/dL   Manual Differential   Result Value Ref Range    Neutrophils %, Manual 92.0 40.0 - 80.0 %    Lymphocytes %, Manual 7.0 13.0 - 44.0 %    Monocytes %, Manual 0.0 2.0 - 10.0 %    Eosinophils %, Manual 0.0 0.0 - 6.0 %    Basophils %, Manual 0.0 0.0 - 2.0 %    Metamyelocytes %, Manual 1.0 0.0 - 0.0 %    Seg Neutrophils Absolute, Manual 3.50 1.20 - 7.00 x10*3/uL    Lymphocytes Absolute, Manual 0.27 (L) 1.20 - 4.80 x10*3/uL    Monocytes Absolute, Manual 0.00 (L) 0.10 - 1.00 x10*3/uL    Eosinophils Absolute, Manual 0.00 0.00 - 0.70 x10*3/uL    Basophils Absolute, Manual 0.00 0.00 - 0.10 x10*3/uL    Metamyelocytes Absolute, Manual 0.04 0.00 - 0.00 x10*3/uL    Total Cells Counted 100     RBC Morphology See Below     Ovalocytes Few     Teardrop Cells Few     [place] Cells Few    Sedimentation rate, automated   Result Value Ref Range    Sedimentation Rate 34 (H) 0 - 30 mm/h     Vascular US Lower Extremity Venous Duplex Bilateral    Result Date: 1/15/2024  Preliminary Cardiology Report            [facility] [place] [address] [id number]     Tel [phone] Fax [phone]          Preliminary Vascular Lab Report  [facility] US LOWER EXTREMITY VENOUS DUPLEX BILATERAL  Patient Name:     LISBET GUTIERRES Swati Physician:  [id number] Mena Arellano MD Study Date:       1/15/2024        Ordering Provider:  [id number] HERIBERTO ROMERO MRN/PID:          [id number]         Fellow: Accession#:       [id number]     Technologist:       Ginna Travis RVT YOB: 1960        Technologist 2: Gender:           F                Encounter#:         [id number] Admission Status: Inpatient        Location Performed: [facility]  Diagnosis/ICD: Localized swelling, mass and lump, neck-R22.1 Indication:    Limb swelling CPT Codes:     46128 Peripheral venous duplex scan for DVT complete  Pertinent History: HTN, Hyperlipidemia, CVA and Anticoagulation. Afib, DM.  PRELIMINARY CONCLUSIONS:  Right Lower Venous: No evidence of acute deep vein thrombus visualized in the right lower extremity. Cannot rule out thrombus in non-visualized peroneal vein due to swelling and edema. Left Lower Venous: No evidence of acute deep vein thrombus visualized in the left lower extremity. Cannot rule out thrombus in non-visualized posterior tibial and peroneal veins due to swelling and edema. Additional Findings: Images are limited due to patient inability to tolerate touch and compressions to LE.  Imaging & Doppler Findings:  Right                 Compressible Thrombus        Flow Distal External Iliac     Yes        None   Spontaneous/Phasic CFV                       Yes        None   Spontaneous/Phasic PFV                       Yes        None FV Proximal               Yes        None   Spontaneous/Phasic FV Mid                    Yes        None FV Distal                 Yes        None Popliteal                 Yes        None   Spontaneous/Phasic PTV                       Yes        None  Left                  Compress Thrombus        Flow Distal External Iliac   Yes      None   Spontaneous/Phasic CFV                     Yes      None   Spontaneous/Phasic PFV                     Yes      None FV Proximal             Yes      None   Spontaneous/Phasic FV Mid                  Yes      None FV Distal               Yes      None Popliteal               Yes      None   Spontaneous/Phasic VASCULAR PRELIMINARY REPORT completed by Ginna Travis RVT on 1/15/2024 at 11:31:32 AM  ** Final **     CT chest abdomen pelvis  wo IV contrast  1. Cardiomegaly with coronary artery disease. 2. Colonic diverticulosis is present without evidence of acute diverticulitis. 3. Nonobstructing right-sided nephrolithiasis. Signed by Claudio Mehta II, MD    CT head wo IV contrast  1. No acute intracranial hemorrhage or infarction is evident. If there remains concern for acute cerebral insult, MRI is recommended. 2. Mucosal thickening in the sinuses with air-fluid level in the right sphenoid sinus as seen with acute sinusitis.   Signed by: Eugene Larsen 1/14/2024 4:34 PM Dictation workstation:   [id number]    Assessment/Plan   This patient currently has cardiac telemetry ordered; if you would like to modify or discontinue the telemetry order, click here to go to the orders activity to modify/discontinue the order.  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 Holliday is a 63yr old female with a pmx of CAD sp CABG 2013, HTN, T2DM, CKD stage 3, Afib on Eliquis, HLD, AAA, intracranial hemorrhage, sensorineural hearing loss, and SLE on chronic steroid use. She lives with her son at home and presents to the ED with increase weakness and confusion as noted by her family. Of note, patient was discharged from a SNF 4-5 days ago for altered mental status and abx treatment of UTI. The workup in ED revealed hypotension, hypoglycemia, elevated lactate, and hypomagnesemia. Patient  was admitted to the hospital for workup of her weakness and altered mental status and treatment of her electrolytes. Patient's chronic steroid use and recent confusion (med compliance) could have lead to adrenal inefficiency given the hypoglycemia and metabolic disturbances.     #Secondary AI in an immunocompromised host with h/o chronic steroid use and immunosuppressive therapy in the setting of SLE  #C/F neuropsychiatric SLE  #Stable hypotension  #Hypoglycemia  #Generalized, diffuse myalgia  #Allodynia  #Altered mental status  Patient presents with hypoglycemia, hypotension, weakness, generalized pain, altered mental status, and history of chronic steroid use.  AI likely secondary to acute infection (considering C-diff) vs other metabolic process  CT of the head unremarkable for any acute process  CT abdomen and pelvis unremarkable for any acute process    PLAN:  -Obtain MRI brain w/ and w/out contrast  -Son will bring in CGM given difficulty to obtain peripheral glucose checks  -Every 4 hour glucose checks Q4  -Pending: CRP, ESR, CK  -Continue pulse dose steroids 75 mg Solu-Cortef every 6 hours  -As needed Tylenol for pain  -Patient passed bedside swallow--not stable for tolerating p.o. intake at this time given lethargy and AMS  -Given frequency of bathroom visits at home and inpatient diarrhea, obtain stool pathogen PCR and C. difficile PCR  -Continue CellCept, folic acid  -PT/OT    # Acute macrocytic, hypochromic anemia  # Anemia of chronic disease  -Current hemoglobin 7.0, admission hgb 8.2  -Baseline hemoglobin 7-8  -H&H for 7 PM 1/15  -Pending type and cross  -No S/S active bleed.  Transfuse hemoglobin less than 7    #Afib  #C/F DVT, ruled out  -EKG in ED showed Afib and was rate controled (home med is amlodipine)  -Bilateral duplex showed no evidence of acute DVT  -Pain to palpation in both lower extremities with b/l LE edema  -Patient has episode of epistaxis and was taken off Eliquis for 7 days but has  since been restarted.    PLAN:  -restart Eliquis 2.5 BID, as patient was resumed on this outpatient  -tele monitoring   -hold amlodipine    #JED (likely pre-renal) in the setting of dehydration + GI loss 2/2 diarrhea  #CKD 3  -Patients son reported she has been consuming less because she can't taste anything.  -Baseline creatinine 1.8-1.9  -24-hour UO since admission 350 cc, net +50 cc  -Admission creatinine 2.24  -Likely in the setting of dehydration +/- GI loss from suspected diarrhea  -S/p IVF bolus in the ED  -UA without evidence of infection    #Chronic sacral wound  -Patients wound is pink, clean, intact and without signs of infection  -wound care consulted  -encourage offloading weight    #T2DM  #HTN  #COPD  #GERD  -continue home mediations     Diet: NPO  Code status: DNR/DNI  DVT prophlaxis: home eliquis and SCDs  Consults: PT/OT, wound care  Disposition: 2-3 days for further evaluation and workup of patients altered mental status, glucose, and blood pressure.      MAINE Win, DO  PGY-2 Internal Medicine

## 2024-01-15 NOTE — ED NOTES
Pt had a large liquid BM, cleaned patient,, changed bed, replaced purwick and repositioned patient in bed      Luna Pantoja RN  01/14/24 [id number]

## 2024-01-15 NOTE — H&P
INTERNAL MEDICINE HISTORY AND PHYSICAL  TEACHING SERVICE - GREEN TEAM    Subjective   Bing Holliday is a 63 y.o. female who presented to the ED with weakness and confusion.     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  She presented to [facility] ED on 1/14/2024 for complaints of confusion and weakness with upper and low back pain.  At time of my examination patient was oriented to self and place, but not situation or time.  She was unable to complete full ROS, I was able to contact her son López to get further information of HPI.   She was recently discharged to skilled nursing facility around 4-5 days ago after being admitted for generalized weakness, urinary tract infection. Over the last couple days she was growing weaker and more confused. She was speaking to people who were not there and was not able to ambulate by herself. Earlier today she was shivering and shaking but did not report feeling warm, so the son encouraged her to eat breakfast and nutritional supplements thinking it was related to her blood sugar which did help, but not resolve her symptoms.She has not had any sick contacts and has not missed any doses of medications that he is aware of. She did take an extra dose ro two of her prednisone yesterday because her rheumatologist told her to do that when she is feeling weaker to try to prevent these episodes. She did have some back pain and took a dose of her home tylenol with minimal relief.  No recent falls or traumas that they are aware of.  Upon examination she denied all complaints of chest pain, shortness of breath, headache, nausea or vomiting; did endorse some abdominal pain upon palpation.     ED Course:  Vitals: Temperature 96.4, , RR 20, BP 84/69, SpO2 96% on room air  Labs:   ->CMP: Glucose 66, sodium 148, potassium 4.0, chloride 110, BUN 51,  creatinine 2.24, EGFR 24  ->CBC: At baseline-Hgb 8.2, hematocrit 27.7, platelets 101, no leukocytosis  -> Troponins 27-32; magnesium 1.5, lactate 3.4-2.1; UA 2+ protein no WBCs or RBCs  Imaging: CT head no acute intracranial hemorrhage or infarct; CXR no acute cardiopulmonary process; CT abdomen pelvis showed cardiomegaly with coronary artery disease, colonic diverticulosis without acute diverticulitis, nonobstructing right-sided nephrolithiasis  EKG: Initial EKG showed A-fib with rapid ventricular response ventricular rate of 105, QTc 459, new ST-T wave abnormality in inferior leads(V@ and V# most prominent when compared to previous EKG in 12/17/23; second EKG continues to show A-fib now rate controlled with a ventricular rate of 86, QTc 478,   Interventions: D50 percent 25 g, 1 dose of vancomycin/ cefepime/Flagyl, 2L LR, 2 g mag sulfate    Past Medical History:   Diagnosis Date    Acute upper respiratory infection, unspecified 03/04/2020    Acute URI    Acute upper respiratory infection, unspecified 09/30/2015    URTI (acute upper respiratory infection)    Arthritis     Body mass index (BMI) 23.0-23.9, adult 10/15/2021    BMI 23.0-23.9, adult    Body mass index (BMI) 33.0-33.9, adult 03/04/2020    BMI 33.0-33.9,adult    Cardiomegaly 08/27/2013    Left ventricular hypertrophy    Chronic kidney disease, stage 3 unspecified (CMS/HCC) 07/02/2013    Chronic kidney disease, stage III (moderate)    Disease of pericardium, unspecified 07/02/2013    Pericardial disease    Encounter for follow-up examination after completed treatment for conditions other than malignant neoplasm 10/06/2022    Hospital discharge follow-up    Generalized contraction of visual field, right eye 01/29/2015    Generalized contraction of visual field of right eye    Homonymous bilateral field defects, right side 04/29/2016    Homonymous bilateral field defects of right side    Hypertensive chronic kidney disease with stage 1 through stage 4 chronic  kidney disease, or unspecified chronic kidney disease 07/02/2013    Nephrosclerosis    Laceration without foreign body, left foot, initial encounter 07/03/2018    Foot laceration, left, initial encounter    Migraine with aura, not intractable, without status migrainosus 10/24/2022    Ocular migraine    Other conditions influencing health status 07/02/2013    Chronic Glomerulonephritis In Diseases Classified Elsewhere    Other conditions influencing health status 07/02/2013    Progressive Familial Myoclonic Epilepsy    Other conditions influencing health status 07/02/2013    Protein S Deficiency    Other conditions influencing health status 05/22/2015    Familial Combined Hyperlipidemia    Other conditions influencing health status 10/24/2022    IDDM (insulin dependent diabetes mellitus)    Other conditions influencing health status 03/14/2022    Diabetes mellitus, insulin dependent (IDDM), uncontrolled    Other long term (current) drug therapy 10/24/2022    Long-term use of Plaquenil    Personal history of diseases of the blood and blood-forming organs and certain disorders involving the immune mechanism 07/02/2013    History of thrombocytopenia    Personal history of diseases of the skin and subcutaneous tissue 08/11/2015    History of foot ulcer    Personal history of nephrotic syndrome 07/02/2013    History of nephrotic syndrome    Personal history of other diseases of the circulatory system 08/27/2013    History of sinus tachycardia    Personal history of other diseases of the nervous system and sense organs     History of cataract    Personal history of other diseases of the respiratory system     History of bronchitis    Personal history of other infectious and parasitic diseases 07/02/2013    History of hepatitis    Personal history of other specified conditions     History of shortness of breath    Personal history of other specified conditions 08/27/2013    History of edema    Puckering of macula, right eye  10/24/2022    ERM OD (epiretinal membrane, right eye)    Raynaud's syndrome without gangrene 07/02/2013    Raynaud's disease    Systemic lupus erythematosus, unspecified (CMS/HCC) 07/24/2015    SLE (systemic lupus erythematosus)    Systemic lupus erythematosus, unspecified (CMS/HCC) 07/24/2015    SLE (systemic lupus erythematosus)    Systemic lupus erythematosus, unspecified (CMS/HCC) 07/24/2015    Systemic lupus    Type 2 diabetes mellitus with diabetic nephropathy (CMS/HCC) 07/02/2013    Type 2 diabetes with nephropathy    Type 2 diabetes mellitus with mild nonproliferative diabetic retinopathy without macular edema, left eye (CMS/HCC) 07/27/2015    Non-proliferative diabetic retinopathy, left eye    Type 2 diabetes mellitus with mild nonproliferative diabetic retinopathy without macular edema, unspecified eye (CMS/HCC) 07/24/2015    Mild non proliferative diabetic retinopathy    Type 2 diabetes mellitus with proliferative diabetic retinopathy without macular edema, right eye (CMS/HCC) 07/27/2015    Proliferative diabetic retinopathy of right eye    Type 2 diabetes mellitus with proliferative diabetic retinopathy without macular edema, unspecified eye (CMS/HCC) 07/24/2015    Diabetic proliferative retinopathy    Unspecified acute and subacute iridocyclitis 07/24/2015    Acute iritis, right eye    Unspecified open wound, left foot, sequela 07/03/2018    Wound, open, foot, left, sequela     Past Surgical History:   Procedure Laterality Date    ANKLE SURGERY  01/29/2015    Ankle Surgery    CHOLECYSTECTOMY  01/29/2015    Cholecystectomy    CT GUIDED PERCUTANEOUS BIOPSY BONE DEEP  5/4/2021    CT GUIDED PERCUTANEOUS BIOPSY BONE DEEP 5/4/2021 [facility]    EYE SURGERY  03/06/2015    Eye Surgery    FOOT SURGERY  01/29/2015    Foot Surgery    MR HEAD ANGIO WO IV CONTRAST  7/26/2013    MR HEAD ANGIO WO IV CONTRAST 7/26/2013 [facility]    MR HEAD ANGIO WO IV CONTRAST  9/17/2021    MR HEAD ANGIO WO IV  CONTRAST 9/17/2021 [facility]    MR HEAD ANGIO WO IV CONTRAST  3/25/2023    MR HEAD ANGIO WO IV CONTRAST [facility]    MR NECK ANGIO WO IV CONTRAST  7/26/2013    MR NECK ANGIO WO IV CONTRAST 7/26/2013 [facility]    MR NECK ANGIO WO IV CONTRAST  9/17/2021    MR NECK ANGIO WO IV CONTRAST 9/17/2021 [facility]    MR NECK ANGIO WO IV CONTRAST  3/25/2023    MR NECK ANGIO WO IV CONTRAST [facility]    OTHER SURGICAL HISTORY  01/29/2015    Creation Of Pericardial Window    OTHER SURGICAL HISTORY  01/29/2015    Quadricepsplasty    TOTAL HIP ARTHROPLASTY  01/29/2015    Hip Replacement     (Not in a hospital admission)    Ace inhibitors, Hydroxychloroquine, Lisinopril, Penicillins, and Sulfa (sulfonamide antibiotics)  Social History     Tobacco Use    Smoking status: Never    Smokeless tobacco: Never   Vaping Use    Vaping Use: Never used   Substance Use Topics    Alcohol use: Never    Drug use: Never     Family History   Problem Relation Name Age of Onset    Other (RENAL DISEASE) Mother          END STAGE    Other (CARDIAC DISORDER) Mother      Cataracts Mother      Stroke Mother      Diabetes Mother      Kidney disease Mother      Lupus Mother      Other (CARDIAC DISORDER) Father      COPD Father      Glaucoma Father      Hypertension Father      Sleep apnea Father      Other (CARDIAC DISORDER) Sister      Depression Sister      Kidney disease Sister      Sickle cell trait Sister      Sleep apnea Daughter           Review of Systems:  Review of Systems   Unable to perform ROS: Mental status change   Cardiovascular:  Negative for chest pain.   Gastrointestinal:  Positive for abdominal pain.   Neurological:  Negative for headaches.          Objective   Vitals:  Most Recent:  Vitals:    01/14/24 1917   BP:    Pulse:    Resp:    Temp: 35.8 °C (96.4 °F)   SpO2:        24hr Min/Max:  Temp  Min: 35.8 °C (96.4 °F)  Max: 35.8 °C (96.4 °F)  Pulse  Min: 66  Max: 114  BP  Min: 75/61  Max: 100/78  Resp  Min: 13   Max: 28  SpO2  Min: 72 %  Max: 96 %      Lab/Radiology/Diagnostic Review:  Results for orders placed or performed during the hospital encounter of 01/14/24 (from the past 24 hour(s))   CBC and Auto Differential   Result Value Ref Range    WBC 4.9 4.4 - 11.3 x10*3/uL    nRBC 1.0 (H) 0.0 - 0.0 /100 WBCs    RBC 2.74 (L) 4.00 - 5.20 x10*6/uL    Hemoglobin 8.2 (L) 12.0 - 16.0 g/dL    Hematocrit 27.7 (L) 36.0 - 46.0 %     (H) 80 - 100 fL    MCH 29.9 26.0 - 34.0 pg    MCHC 29.6 (L) 32.0 - 36.0 g/dL    RDW 20.0 (H) 11.5 - 14.5 %    Platelets 101 (L) 150 - 450 x10*3/uL    Neutrophils % 82.6 40.0 - 80.0 %    Immature Granulocytes %, Automated 0.4 0.0 - 0.9 %    Lymphocytes % 13.6 13.0 - 44.0 %    Monocytes % 3.0 2.0 - 10.0 %    Eosinophils % 0.2 0.0 - 6.0 %    Basophils % 0.2 0.0 - 2.0 %    Neutrophils Absolute 4.08 1.20 - 7.70 x10*3/uL    Immature Granulocytes Absolute, Automated 0.02 0.00 - 0.70 x10*3/uL    Lymphocytes Absolute 0.67 (L) 1.20 - 4.80 x10*3/uL    Monocytes Absolute 0.15 0.10 - 1.00 x10*3/uL    Eosinophils Absolute 0.01 0.00 - 0.70 x10*3/uL    Basophils Absolute 0.01 0.00 - 0.10 x10*3/uL   Comprehensive Metabolic Panel   Result Value Ref Range    Glucose 66 (L) 74 - 99 mg/dL    Sodium 148 (H) 136 - 145 mmol/L    Potassium 4.0 3.5 - 5.3 mmol/L    Chloride 110 (H) 98 - 107 mmol/L    Bicarbonate 27 21 - 32 mmol/L    Anion Gap 15 10 - 20 mmol/L    Urea Nitrogen 51 (H) 6 - 23 mg/dL    Creatinine 2.24 (H) 0.50 - 1.05 mg/dL    eGFR 24 (L) >60 mL/min/1.73m*2    Calcium 8.1 (L) 8.6 - 10.3 mg/dL    Albumin 3.2 (L) 3.4 - 5.0 g/dL    Alkaline Phosphatase 96 33 - 136 U/L    Total Protein 5.2 (L) 6.4 - 8.2 g/dL    AST 33 9 - 39 U/L    Bilirubin, Total 0.2 0.0 - 1.2 mg/dL    ALT 38 7 - 45 U/L   Magnesium   Result Value Ref Range    Magnesium 1.50 (L) 1.60 - 2.40 mg/dL   Troponin I, High Sensitivity, Initial   Result Value Ref Range    Troponin I, High Sensitivity 27 (H) 0 - 13 ng/L   Lactate   Result Value Ref Range     Lactate 3.4 (H) 0.4 - 2.0 mmol/L   Light Blue Top   Result Value Ref Range    Extra Tube Hold for add-ons.    Lavender Top   Result Value Ref Range    Extra Tube Hold for add-ons.    Sars-CoV-2 and Influenza A/B PCR   Result Value Ref Range    Flu A Result Not Detected Not Detected    Flu B Result Not Detected Not Detected    Coronavirus 2019, PCR Not Detected Not Detected   POCT GLUCOSE   Result Value Ref Range    POCT Glucose 54 (L) 74 - 99 mg/dL   Urinalysis with Reflex Culture and Microscopic   Result Value Ref Range    Color, Urine Yellow Straw, Yellow    Appearance, Urine Hazy (N) Clear    Specific Gravity, Urine 1.015 1.005 - 1.035    pH, Urine 5.0 5.0, 5.5, 6.0, 6.5, 7.0, 7.5, 8.0    Protein, Urine 100 (2+) (N) NEGATIVE mg/dL    Glucose, Urine NEGATIVE NEGATIVE mg/dL    Blood, Urine NEGATIVE NEGATIVE    Ketones, Urine NEGATIVE NEGATIVE mg/dL    Bilirubin, Urine NEGATIVE NEGATIVE    Urobilinogen, Urine <2.0 <2.0 mg/dL    Nitrite, Urine NEGATIVE NEGATIVE    Leukocyte Esterase, Urine NEGATIVE NEGATIVE   Lactate   Result Value Ref Range    Lactate 2.1 (H) 0.4 - 2.0 mmol/L   Urinalysis Microscopic   Result Value Ref Range    WBC, Urine NONE 1-5, NONE /HPF    RBC, Urine NONE NONE, 1-2, 3-5 /HPF   Troponin, High Sensitivity, 1 Hour   Result Value Ref Range    Troponin I, High Sensitivity 32 (H) 0 - 13 ng/L   POCT GLUCOSE   Result Value Ref Range    POCT Glucose 160 (H) 74 - 99 mg/dL     CT chest abdomen pelvis wo IV contrast    Result Date: 1/14/2024  STUDY: CT Chest, Abdomen, and Pelvis without IV Contrast; 01/14/2024 at 5:45 PM INDICATION: Sepsis. Generalized abdominal pain. JED on CKD. COMPARISON: CT abdomen and pelvis 11/20/23, 08/28/23. CT CAP 03/21/23. XR chest 01/14/24, 12/17/23, 11/20/23. ACCESSION NUMBER(S): [id number] ORDERING CLINICIAN: Jaziel Tyler TECHNIQUE: CT of the chest, abdomen, and pelvis was performed.  Contiguous axial images were obtained at 3 mm slice thickness through the chest, abdomen, and  pelvis.  Coronal and sagittal reconstructions at 3 mm slice thickness were performed.  No intravenous contrast was administered.  FINDINGS: Please note that the evaluation of vessels, lymph nodes and organs is limited without intravenous contrast. CHEST: MEDIASTINUM: Heart is enlarged.  Atherosclerosis of the thoracic aorta and coronary arteries is present.  The heart is without pericardial effusion. LUNGS/PLEURA: There is no pleural effusion, pleural thickening, or pneumothorax. The airways are patent. Lungs are clear without consolidation, interstitial disease, or suspicious nodules.  Atelectatic changes are present bilaterally. Patient motion limits assessment. LYMPH NODES: Thoracic lymph nodes are not enlarged. ABDOMEN:  LIVER: No hepatomegaly.  Smooth surface contour.  Normal attenuation.  BILE DUCTS: No intrahepatic or extrahepatic biliary ductal dilatation.  GALLBLADDER: The gallbladder is absent or severely contracted.  STOMACH: No abnormalities identified.  PANCREAS: No masses or ductal dilatation.  SPLEEN: No splenomegaly or focal splenic lesion.  ADRENAL GLANDS: No thickening or nodules.  KIDNEYS AND URETERS: Kidneys are normal in size and location.  Punctate nonobstructing RIGHT renal stone noted.  PELVIS:  BLADDER: No abnormalities identified.  REPRODUCTIVE ORGANS: No abnormalities identified.  BOWEL: Colonic diverticulosis is present without evidence of acute diverticulitis.  VESSELS: No abnormalities identified.  Abdominal aorta is normal in caliber.  PERITONEUM/RETROPERITONEUM/LYMPH NODES: No free fluid.  No pneumoperitoneum. No lymphadenopathy.  ABDOMINAL WALL: No abnormalities identified. SOFT TISSUES: No abnormalities identified.  BONES: No acute fracture or aggressive osseous lesion.  Degenerative changes noted about the shoulders.  RIGHT hip arthroplasty is present. Artifact from the prosthesis limits assessment of the pelvis. Prosthesis appears well seated.  Severe degenerative change of LEFT  hip is present.  Multilevel degenerative changes are seen throughout the spine.  Mild scoliosis of the thoracolumbar spine is present. Multilevel degenerative changes noted throughout the lumbar spine with severe disc space narrowing and degenerative endplate change at L2-L5.    1. Cardiomegaly with coronary artery disease. 2. Colonic diverticulosis is present without evidence of acute diverticulitis. 3. Nonobstructing right-sided nephrolithiasis. Signed by Claudio Mehta II, MD    CT head wo IV contrast    Result Date: 1/14/2024  Interpreted By:  Eugene Larsen, STUDY: CT of the brain without contrast dated  1/14/2024.   INDICATION: Signs/Symptoms:Confusion   COMPARISON: CT of the brain dated 12/17/2020   ACCESSION NUMBER(S): [id number]   ORDERING CLINICIAN: KEVIN SAHH   TECHNIQUE: Axial non-contrast CT of the brain was performed. Sagittal and coronal 2D reformats were obtained.   FINDINGS: BRAIN PARENCHYMA:   No acute intracranial hemorrhage or infarction is evident.  There is a focus left occipital encephalomalacia. Is similar to the prior exam. There is incidental note of basal ganglia calcifications.   VENTRICLES AND EXTRA-AXIAL SPACES:   No hydrocephalus or midline shift is evident.  There is mild focal ex vacuo dilation of the posterior horn of the left lateral ventricle related to the occipital encephalomalacia.   INTRACRANIAL VESSELS:   Calcified atheromatous disease is seen of the visualized carotid and vertebrobasilar arterial trees.   SINUSES AND MASTOIDS:   Mucosal thickening is seen in the maxillary sinuses and multiple ethmoid air cells with complete opacification of some posterior right ethmoid air cells. There is an air-fluid level in the right sphenoid sinus. Mucoperiosteal thickening is seen in the maxillary sinuses right greater than left compatible with sequelae of chronic sinus disease. Mastoid air cells are clear.   OSSEOUS STRUCTURES:   No osseous injury is evident.   SOFT TISSUES:    Visualized associated soft tissues are grossly unremarkable.       1. No acute intracranial hemorrhage or infarction is evident. If there remains concern for acute cerebral insult, MRI is recommended. 2. Mucosal thickening in the sinuses with air-fluid level in the right sphenoid sinus as seen with acute sinusitis.   Signed by: Eugene Larsen 1/14/2024 4:34 PM Dictation workstation:   [id number]    XR chest 1 view    Result Date: 1/14/2024  STUDY: Chest Radiograph;  01/14/2024 at 2:39 PM INDICATION: Sepsis. Generalized weakness. COMPARISON: XR chest 11/20/23, 10/19/23, 08/27/23. ACCESSION NUMBER(S): [id number] ORDERING CLINICIAN: Jaziel Tyler TECHNIQUE:  Frontal chest was obtained at 1439 hours. FINDINGS: CARDIOMEDIASTINAL SILHOUETTE: Cardiomediastinal silhouette is top limits of normal for technique.  LUNGS: Lungs are clear.  ABDOMEN: No remarkable upper abdominal findings.  BONES: No acute osseous changes.    No evidence consolidating infiltrate or effusion. Signed by Can Lambert MD    ECG 12 lead    Result Date: 12/18/2023  Marked sinus bradycardia Moderate voltage criteria for LVH, may be normal variant Nonspecific T wave abnormality Borderline ECG When compared with ECG of 20-NOV-2023 15:44, No significant change was found Confirmed by Cheko Mike ([id number]) on 12/18/2023 9:46:20 PM    CT head wo IV contrast    Result Date: 12/17/2023  Interpreted By:  Tr Colunga, STUDY: CT HEAD WO IV CONTRAST;  12/17/2023 1:12 am   INDICATION: Signs/Symptoms:confusion, weakness.   COMPARISON: Head CT dated 10/19/2022.   ACCESSION NUMBER(S): [id number]   ORDERING CLINICIAN: DHARA CHAO   TECHNIQUE: Axial noncontrast CT images of the head.   FINDINGS: Gray-white matter differentiation is maintained. There are no extra-axial collections. No mass effect or midline shift. There is no hydrocephalus. There is mild cerebral involutional change. There is a chronic left occipital infarct. There are chronic bilateral basal  ganglia lacunar infarcts and scattered periventricular and deep white matter hypodensities suggestive of mild chronic microvascular ischemic change.   HEMORRHAGE: No acute intracranial hemorrhage.   CALVARIUM: No depressed skull fracture.   EXTRACRANIAL SOFT TISSUES:.. Unremarkable.   PARANASAL SINUSES/MASTOIDS: The visualized paranasal sinuses are well aerated. Mastoid air cells are clear.   ORBITS: Grossly normal.       Unchanged chronic left occipital infarct as well as mild chronic small-vessel ischemic changes.     Signed by: Tr Colunga 12/17/2023 1:32 AM Dictation workstation:   [id number]    XR foot left 1-2 views    Result Date: 12/17/2023  Interpreted By:  Tr Colunga, STUDY: XR FOOT LEFT 1-2 VIEWS; ;  12/17/2023 1:07 am   INDICATION: Signs/Symptoms:diabetic foot ulcer.   COMPARISON: Left foot radiographs dated 07/12/2018.   ACCESSION NUMBER(S): [id number]   ORDERING CLINICIAN: DHARA CHAO   FINDINGS: No evidence of acute fracture or dislocation. Chronic deformity and ghost tracks within the distal tibia and fibula related to previous fracture. There is similar appearance of 1st metatarsophalangeal hallux valgus deformity and 2nd through 5th hammertoe deformities, which somewhat limits evaluation for focal abnormalities. There is extensive mid foot arthrosis with osseous fusion of the mid tarsal bones similar to prior..   There is diffuse edema around the left foot, without clear evidence of abnormal periosteal reaction or erosion.       Extensive degenerative changes in the left foot with chronic mid tarsal osseous fusion. No radiographic evidence of osseous erosion or abnormal periosteal reaction. Nonspecific diffuse soft tissue swelling, similar to prior.     MACRO: None   Signed by: Tr Colunga 12/17/2023 1:29 AM Dictation workstation:   [id number]    XR chest 1 view    Result Date: 12/17/2023  Interpreted By:  Tr Colunga, STUDY: XR CHEST 1 VIEW;  12/17/2023 1:07 am   INDICATION:  Signs/Symptoms:sob.   COMPARISON: Correlation made with CT abdomen dated 11/20/2022.   ACCESSION NUMBER(S): [id number]   ORDERING CLINICIAN: DANIELA MULLEN   FINDINGS:   CARDIOMEDIASTINAL SILHOUETTE: Cardiomediastinal silhouette is mildly enlarged taking into account portable technique. Heavy coronary artery atherosclerotic calcifications better visualized on the CT scan.   LUNGS/PLEURA: There are no consolidations.There are no pleural effusions. There is no demonstrated pneumothorax.     BONES: No evidence of acute osseous abnormality.       1.  No evidence of acute cardiopulmonary process. Mild cardiomegaly and extensive coronary artery atherosclerotic calcifications.     Signed by: Tr Colunga 12/17/2023 1:25 AM Dictation workstation:   [id number]       Intake/Output:    Intake/Output Summary (Last 24 hours) at 1/14/2024 1947  Last data filed at 1/14/2024 1512  Gross per 24 hour   Intake 400 ml   Output --   Net 400 ml       Physical exam:    Physical Exam       Assessment /Plan    Active Problems:  There are no active Hospital Problems.      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 They have been admitted to the hospital for adrenal insuffiencey resulting in altered mentation and metabolic disturbances. She has long standing hx of chronic steroid use and possible medication noncompliance if she was not feeling herself and becoming more confused in the last week. Workup in ED revealed hypoglycemia, elevated lactate, normal UA, and mildly elevated but not uptrending troponins. Given hx of chronic steroid use this seems to be in setting of adrenal inefficiency given refractory hypoglycemia and metabolic disturbances. Infection not as likely given no leukocytosis  (although she has a hx of pancytopenia so she us not able to mount a full immune response), no concern on CXR for acute cardiopulmonary process, no acute changes on head CT, negative UA, no sick contacts and afebrile at home and here. She does have a chronic wound, that did not appear infected, purulent, nontender, erythematous at time of examination.     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 CT chest abdomen pelvis without acute findings.  -Magnesium 1.5-> repleted in ED, will recheck on morning labs  -100 Solucortef in ED, continue 75mg q6 for AI. Reevaluate for discontinuation.   -Monitor glucose q4 if glucose stable   -Check RFP, CBC, Mag on morning labs to rule out metabolic causes and monitor renal function and electrolytes  -For concerns of weakness bedside swallow eval  -Consider inflammatory markers of SedRate and CRP if concerned about infection  -PT/OT evaluation    #Hx Of Paroxtsmal Afib  #Venous Stasis Dermatitis in LE  #BL Leg swelling. L>R  -EKG showed Afib with rate controlled, on amlodipine at home  -Previously taken off Eliquis on previous admission for 7 days due to concerns of epistaxis  -Followed up with ENT and they reccommended restarting eliquis 1/1/24  -Follow up with medication  reconsilliatoin to see if she was taking it  -Low concern for PE if she was still on Eliquis  -D-Dimer could be false positive given extensive medical hx and chronic inflamatoion  -Venous Duplex of BL LE to assess for DVTs    # Elevated Cr  # CKD stage III  -Cr 2.26 and baseline is around 2  -Most likley in setting of poor PO intake 2/2 confusion at home  -Given 2L of LR in ED  -Encourage PO fluid hydration once diet is reinstated  -RFP to monitor renal function    # Abdominal pain  -Tender to palpation all over, Not sure about bowel habits  -CT abdomen did not show any acute sign of infection or inflammatory changes  -If she has any diarrhea, consider stool studies given recent abx use for UTI on last admission    #Back Pain  -Not complaining of back pain during examination  -Home medication for now and escalate if needed    #T2DM  #HTN  #COPD  #GERD  -Continue home medications as appropriate      Consultants: PT/OT  Diet: NPO until swallow eval  Code Status: DNR/DNI  DVT Prophylaxis: Home eliquis and SCDs  Disposition: Pending glucose levels and PT/OT evaluation    This assessment and plan has been discussed with the senior resident and to be discussed with attending physician.    Eugenia Yu DO  Family Medicine, PGY-1      Patient seen and examined separate from Dr. Yu. Above plan reflects my input,     Marilee Jean DO, PGY3

## 2024-01-15 NOTE — NURSING NOTE
Pt transferred into 2100 from ICU, just had MRI of brain.  Pt responsive to pain.  Incontinent of large amount of urine during mri.  Cleaned and turned, pt c/o pain, sacral dressing changed, no drainage.  Repositioned.  Call light in reach.

## 2024-01-15 NOTE — CONSULTS
Wound Care Consult     Visit Date: 1/15/2024      Patient Name: Bing Holliday         MRN: [id number]           YOB: 1960     Reason for Consult: Stage 2 Pressure Injuries and Left Breast Wound        Wound History: Present on admission      Pertinent Labs:   Albumin   Date Value Ref Range Status   01/15/2024 2.7 (L) 3.4 - 5.0 g/dL Final   04/29/2021 3.1 (L) 3.4 - 5.0 g/dL Final     Albumin, CSF   Date Value Ref Range Status   02/25/2022 16 0 - 35 mg/dL Final     Albumin Index   Date Value Ref Range Status   02/25/2022 7.6 0.0 - 9.0 ratio Final     Albumin, Serum   Date Value Ref Range Status   02/25/2022 2,099 (L) 3,500 - 5,200 mg/dL Final       Wound Assessment:  Wound 10/19/23 Dorsal foot;Left (Active)       Wound 12/17/23 Diabetic Ulcer Foot Left;Lateral (Active)       Wound 01/14/24 Other (comment) Breast Left;Upper (Active)   Site Assessment [place] 01/15/24 1016   [place]-Wound Assessment Intact 01/15/24 1016   Drainage Description None 01/15/24 1016   Drainage Amount None 01/15/24 1016   Dressing Open to air 01/15/24 1016       Wound 01/14/24 Pressure Injury Buttocks (Active)   Wound Image    01/15/24 0114   Site Assessment Pink;Clean 01/15/24 1016   [place]-Wound Assessment Intact 01/15/24 1016   Pressure Injury Stage 2 01/15/24 1016   Margins Well-defined edges 01/15/24 1016   Drainage Description None 01/15/24 1016   Dressing Foam 01/15/24 1016   Dressing Changed New 01/15/24 0114   Dressing Status Clean;Dry 01/15/24 1016       Wound Team Summary Assessment: Patient has 2 stage 2 pressure injuries one to coccyx and one right buttock. No drainage noted. Appear to be healing. Left breast appears to have fungal rash, Nystatin ordered. Left plantar foot wound healed. Left foot toe has a dry stable scabbed area, no drainage noted.      Wound Team Plan: Recommendation for wound care to coccyx and right buttock wound continue with Mepilex dressing and change every 3 days and as needed for soilage.  Continue with Nystatin powder to left breast. Recommend dry dressing to left foot/toe wounds. Will follow.      Esme Meadows RN  1/15/2024  10:19 AM

## 2024-01-15 NOTE — ED NOTES
Called ICU, report given to Alisson BURR. Bringing patient to floor now.      Luna Pantoja RN  01/15/24 0007

## 2024-01-15 NOTE — NURSING NOTE
1530:  Report received from previous shift RN.     1550:  Pt. Down to MRI via bed and transporter.  Report called to CCU Stepdown to RN for room 2100.  Pt will be transported to room 2100 after MRI.

## 2024-01-16 ENCOUNTER — APPOINTMENT (OUTPATIENT)
Dept: RADIOLOGY | Facility: HOSPITAL | Age: 64
DRG: 643 | End: 2024-01-16
Payer: MEDICARE

## 2024-01-16 ENCOUNTER — HOSPITAL ENCOUNTER (INPATIENT)
Dept: NEUROLOGY | Facility: HOSPITAL | Age: 64
Discharge: HOME | DRG: 643 | End: 2024-01-16
Payer: MEDICARE

## 2024-01-16 LAB
ALBUMIN SERPL BCP-MCNC: 2.5 G/DL (ref 3.4–5)
ALBUMIN SERPL BCP-MCNC: 2.6 G/DL (ref 3.4–5)
ALBUMIN SERPL BCP-MCNC: 2.7 G/DL (ref 3.4–5)
ALBUMIN SERPL BCP-MCNC: 2.7 G/DL (ref 3.4–5)
ANION GAP SERPL CALC-SCNC: 10 MMOL/L (ref 10–20)
ANION GAP SERPL CALC-SCNC: 10 MMOL/L (ref 10–20)
ANION GAP SERPL CALC-SCNC: 12 MMOL/L (ref 10–20)
ANION GAP SERPL CALC-SCNC: 14 MMOL/L (ref 10–20)
APTT PPP: 37 SECONDS (ref 27–38)
ATRIAL RATE: 117 BPM
ATRIAL RATE: 208 BPM
BASOPHILS # BLD AUTO: 0 X10*3/UL (ref 0–0.1)
BASOPHILS NFR BLD AUTO: 0 %
BLOOD EXPIRATION DATE: NORMAL
BUN SERPL-MCNC: 27 MG/DL (ref 6–23)
BUN SERPL-MCNC: 31 MG/DL (ref 6–23)
BUN SERPL-MCNC: 31 MG/DL (ref 6–23)
BUN SERPL-MCNC: 32 MG/DL (ref 6–23)
CALCIUM SERPL-MCNC: 7.8 MG/DL (ref 8.6–10.3)
CALCIUM SERPL-MCNC: 7.8 MG/DL (ref 8.6–10.3)
CALCIUM SERPL-MCNC: 8.1 MG/DL (ref 8.6–10.3)
CALCIUM SERPL-MCNC: 8.3 MG/DL (ref 8.6–10.3)
CHLORIDE SERPL-SCNC: 113 MMOL/L (ref 98–107)
CHLORIDE SERPL-SCNC: 114 MMOL/L (ref 98–107)
CHLORIDE SERPL-SCNC: 117 MMOL/L (ref 98–107)
CHLORIDE SERPL-SCNC: 119 MMOL/L (ref 98–107)
CO2 SERPL-SCNC: 23 MMOL/L (ref 21–32)
CO2 SERPL-SCNC: 25 MMOL/L (ref 21–32)
CO2 SERPL-SCNC: 25 MMOL/L (ref 21–32)
CO2 SERPL-SCNC: 26 MMOL/L (ref 21–32)
CREAT SERPL-MCNC: 1.55 MG/DL (ref 0.5–1.05)
CREAT SERPL-MCNC: 1.56 MG/DL (ref 0.5–1.05)
CREAT SERPL-MCNC: 1.61 MG/DL (ref 0.5–1.05)
CREAT SERPL-MCNC: 1.65 MG/DL (ref 0.5–1.05)
DISPENSE STATUS: NORMAL
EGFRCR SERPLBLD CKD-EPI 2021: 35 ML/MIN/1.73M*2
EGFRCR SERPLBLD CKD-EPI 2021: 36 ML/MIN/1.73M*2
EGFRCR SERPLBLD CKD-EPI 2021: 37 ML/MIN/1.73M*2
EGFRCR SERPLBLD CKD-EPI 2021: 38 ML/MIN/1.73M*2
EOSINOPHIL # BLD AUTO: 0 X10*3/UL (ref 0–0.7)
EOSINOPHIL NFR BLD AUTO: 0 %
ERYTHROCYTE [DISTWIDTH] IN BLOOD BY AUTOMATED COUNT: 20.3 % (ref 11.5–14.5)
ERYTHROCYTE [DISTWIDTH] IN BLOOD BY AUTOMATED COUNT: 20.7 % (ref 11.5–14.5)
ERYTHROCYTE [DISTWIDTH] IN BLOOD BY AUTOMATED COUNT: 20.7 % (ref 11.5–14.5)
FERRITIN SERPL-MCNC: 748 NG/ML (ref 8–150)
GLUCOSE BLD MANUAL STRIP-MCNC: 118 MG/DL (ref 74–99)
GLUCOSE BLD MANUAL STRIP-MCNC: 182 MG/DL (ref 74–99)
GLUCOSE BLD MANUAL STRIP-MCNC: 23 MG/DL (ref 74–99)
GLUCOSE BLD MANUAL STRIP-MCNC: 230 MG/DL (ref 74–99)
GLUCOSE BLD MANUAL STRIP-MCNC: 239 MG/DL (ref 74–99)
GLUCOSE BLD MANUAL STRIP-MCNC: 242 MG/DL (ref 74–99)
GLUCOSE BLD MANUAL STRIP-MCNC: 61 MG/DL (ref 74–99)
GLUCOSE BLD MANUAL STRIP-MCNC: 92 MG/DL (ref 74–99)
GLUCOSE BLD MANUAL STRIP-MCNC: 95 MG/DL (ref 74–99)
GLUCOSE SERPL-MCNC: 146 MG/DL (ref 74–99)
GLUCOSE SERPL-MCNC: 223 MG/DL (ref 74–99)
GLUCOSE SERPL-MCNC: 268 MG/DL (ref 74–99)
GLUCOSE SERPL-MCNC: 92 MG/DL (ref 74–99)
HCT VFR BLD AUTO: 21.7 % (ref 36–46)
HCT VFR BLD AUTO: 21.7 % (ref 36–46)
HCT VFR BLD AUTO: 24.3 % (ref 36–46)
HCT VFR BLD AUTO: 24.4 % (ref 36–46)
HGB BLD-MCNC: 6.5 G/DL (ref 12–16)
HGB BLD-MCNC: 6.5 G/DL (ref 12–16)
HGB BLD-MCNC: 7 G/DL (ref 12–16)
HGB BLD-MCNC: 7.4 G/DL (ref 12–16)
HGB RETIC QN: 31 PG (ref 28–38)
IMM GRANULOCYTES # BLD AUTO: 0.05 X10*3/UL (ref 0–0.7)
IMM GRANULOCYTES NFR BLD AUTO: 1.3 % (ref 0–0.9)
IMMATURE RETIC FRACTION: 20.1 %
INR PPP: 1 (ref 0.9–1.1)
IRON SATN MFR SERPL: 49 % (ref 25–45)
IRON SERPL-MCNC: 107 UG/DL (ref 35–150)
LACTATE SERPL-SCNC: 0.8 MMOL/L (ref 0.4–2)
LYMPHOCYTES # BLD AUTO: 0.36 X10*3/UL (ref 1.2–4.8)
LYMPHOCYTES NFR BLD AUTO: 9.6 %
MAGNESIUM SERPL-MCNC: 1.96 MG/DL (ref 1.6–2.4)
MCH RBC QN AUTO: 29.5 PG (ref 26–34)
MCH RBC QN AUTO: 30 PG (ref 26–34)
MCH RBC QN AUTO: 30 PG (ref 26–34)
MCHC RBC AUTO-ENTMCNC: 28.8 G/DL (ref 32–36)
MCHC RBC AUTO-ENTMCNC: 29.4 G/DL (ref 32–36)
MCHC RBC AUTO-ENTMCNC: 30.3 G/DL (ref 32–36)
MCV RBC AUTO: 101 FL (ref 80–100)
MCV RBC AUTO: 104 FL (ref 80–100)
MCV RBC AUTO: 99 FL (ref 80–100)
MONOCYTES # BLD AUTO: 0.16 X10*3/UL (ref 0.1–1)
MONOCYTES NFR BLD AUTO: 4.3 %
NEUTROPHILS # BLD AUTO: 3.17 X10*3/UL (ref 1.2–7.7)
NEUTROPHILS NFR BLD AUTO: 84.8 %
NRBC BLD-RTO: 1.1 /100 WBCS (ref 0–0)
NRBC BLD-RTO: 1.6 /100 WBCS (ref 0–0)
NRBC BLD-RTO: 1.7 /100 WBCS (ref 0–0)
PHOSPHATE SERPL-MCNC: 2 MG/DL (ref 2.5–4.9)
PHOSPHATE SERPL-MCNC: 2.4 MG/DL (ref 2.5–4.9)
PHOSPHATE SERPL-MCNC: 2.7 MG/DL (ref 2.5–4.9)
PHOSPHATE SERPL-MCNC: 3.1 MG/DL (ref 2.5–4.9)
PLATELET # BLD AUTO: 75 X10*3/UL (ref 150–450)
PLATELET # BLD AUTO: 76 X10*3/UL (ref 150–450)
PLATELET # BLD AUTO: 82 X10*3/UL (ref 150–450)
POTASSIUM SERPL-SCNC: 3.9 MMOL/L (ref 3.5–5.3)
POTASSIUM SERPL-SCNC: 3.9 MMOL/L (ref 3.5–5.3)
POTASSIUM SERPL-SCNC: 4.1 MMOL/L (ref 3.5–5.3)
POTASSIUM SERPL-SCNC: 4.2 MMOL/L (ref 3.5–5.3)
PRODUCT BLOOD TYPE: 5100
PRODUCT CODE: NORMAL
PROTHROMBIN TIME: 11 SECONDS (ref 9.8–12.8)
Q ONSET: 215 MS
Q ONSET: 216 MS
QRS COUNT: 14 BEATS
QRS COUNT: 17 BEATS
QRS DURATION: 100 MS
QRS DURATION: 94 MS
QT INTERVAL: 348 MS
QT INTERVAL: 400 MS
QTC CALCULATION(BAZETT): 459 MS
QTC CALCULATION(BAZETT): 478 MS
QTC FREDERICIA: 419 MS
QTC FREDERICIA: 451 MS
R AXIS: 16 DEGREES
R AXIS: 3 DEGREES
RBC # BLD AUTO: 2.17 X10*6/UL (ref 4–5.2)
RBC # BLD AUTO: 2.33 X10*6/UL (ref 4–5.2)
RBC # BLD AUTO: 2.47 X10*6/UL (ref 4–5.2)
RETICS #: 0.02 X10*6/UL (ref 0.02–0.08)
RETICS/RBC NFR AUTO: 0.9 % (ref 0.5–2)
SODIUM SERPL-SCNC: 144 MMOL/L (ref 136–145)
SODIUM SERPL-SCNC: 146 MMOL/L (ref 136–145)
SODIUM SERPL-SCNC: 150 MMOL/L (ref 136–145)
SODIUM SERPL-SCNC: 152 MMOL/L (ref 136–145)
T AXIS: -65 DEGREES
T AXIS: 224 DEGREES
T OFFSET: 389 MS
T OFFSET: 416 MS
TIBC SERPL-MCNC: 218 UG/DL (ref 240–445)
TSH SERPL-ACNC: 0.55 MIU/L (ref 0.44–3.98)
UIBC SERPL-MCNC: 111 UG/DL (ref 110–370)
UNIT ABO: NORMAL
UNIT NUMBER: NORMAL
UNIT RH: NORMAL
UNIT VOLUME: 350
VENTRICULAR RATE: 105 BPM
VENTRICULAR RATE: 86 BPM
WBC # BLD AUTO: 3.5 X10*3/UL (ref 4.4–11.3)
WBC # BLD AUTO: 3.7 X10*3/UL (ref 4.4–11.3)
WBC # BLD AUTO: 4.2 X10*3/UL (ref 4.4–11.3)
XM INTEP: NORMAL

## 2024-01-16 PROCEDURE — 83550 IRON BINDING TEST: CPT | Performed by: STUDENT IN AN ORGANIZED HEALTH CARE EDUCATION/TRAINING PROGRAM

## 2024-01-16 PROCEDURE — 84146 ASSAY OF PROLACTIN: CPT | Mod: STJLAB | Performed by: STUDENT IN AN ORGANIZED HEALTH CARE EDUCATION/TRAINING PROGRAM

## 2024-01-16 PROCEDURE — 99222 1ST HOSP IP/OBS MODERATE 55: CPT | Performed by: NURSE PRACTITIONER

## 2024-01-16 PROCEDURE — 86641 CRYPTOCOCCUS ANTIBODY: CPT | Mod: STJLAB

## 2024-01-16 PROCEDURE — 82746 ASSAY OF FOLIC ACID SERUM: CPT | Mod: STJLAB | Performed by: STUDENT IN AN ORGANIZED HEALTH CARE EDUCATION/TRAINING PROGRAM

## 2024-01-16 PROCEDURE — 36569 INSJ PICC 5 YR+ W/O IMAGING: CPT

## 2024-01-16 PROCEDURE — 2500000004 HC RX 250 GENERAL PHARMACY W/ HCPCS (ALT 636 FOR OP/ED)

## 2024-01-16 PROCEDURE — 2500000005 HC RX 250 GENERAL PHARMACY W/O HCPCS

## 2024-01-16 PROCEDURE — 36430 TRANSFUSION BLD/BLD COMPNT: CPT

## 2024-01-16 PROCEDURE — 86235 NUCLEAR ANTIGEN ANTIBODY: CPT | Mod: STJLAB

## 2024-01-16 PROCEDURE — 84443 ASSAY THYROID STIM HORMONE: CPT

## 2024-01-16 PROCEDURE — 85027 COMPLETE CBC AUTOMATED: CPT | Performed by: STUDENT IN AN ORGANIZED HEALTH CARE EDUCATION/TRAINING PROGRAM

## 2024-01-16 PROCEDURE — 85730 THROMBOPLASTIN TIME PARTIAL: CPT | Performed by: NURSE PRACTITIONER

## 2024-01-16 PROCEDURE — 2500000004 HC RX 250 GENERAL PHARMACY W/ HCPCS (ALT 636 FOR OP/ED): Performed by: STUDENT IN AN ORGANIZED HEALTH CARE EDUCATION/TRAINING PROGRAM

## 2024-01-16 PROCEDURE — 85014 HEMATOCRIT: CPT

## 2024-01-16 PROCEDURE — 82728 ASSAY OF FERRITIN: CPT | Performed by: STUDENT IN AN ORGANIZED HEALTH CARE EDUCATION/TRAINING PROGRAM

## 2024-01-16 PROCEDURE — 83605 ASSAY OF LACTIC ACID: CPT | Performed by: STUDENT IN AN ORGANIZED HEALTH CARE EDUCATION/TRAINING PROGRAM

## 2024-01-16 PROCEDURE — 85610 PROTHROMBIN TIME: CPT | Performed by: NURSE PRACTITIONER

## 2024-01-16 PROCEDURE — 36415 COLL VENOUS BLD VENIPUNCTURE: CPT

## 2024-01-16 PROCEDURE — C1751 CATH, INF, PER/CENT/MIDLINE: HCPCS

## 2024-01-16 PROCEDURE — 82947 ASSAY GLUCOSE BLOOD QUANT: CPT

## 2024-01-16 PROCEDURE — 86225 DNA ANTIBODY NATIVE: CPT | Mod: STJLAB

## 2024-01-16 PROCEDURE — 80069 RENAL FUNCTION PANEL: CPT

## 2024-01-16 PROCEDURE — 2500000004 HC RX 250 GENERAL PHARMACY W/ HCPCS (ALT 636 FOR OP/ED): Performed by: NURSE PRACTITIONER

## 2024-01-16 PROCEDURE — 87081 CULTURE SCREEN ONLY: CPT | Mod: STJLAB

## 2024-01-16 PROCEDURE — 85025 COMPLETE CBC W/AUTO DIFF WBC: CPT | Performed by: STUDENT IN AN ORGANIZED HEALTH CARE EDUCATION/TRAINING PROGRAM

## 2024-01-16 PROCEDURE — 2500000004 HC RX 250 GENERAL PHARMACY W/ HCPCS (ALT 636 FOR OP/ED): Performed by: INTERNAL MEDICINE

## 2024-01-16 PROCEDURE — 36410 VNPNXR 3YR/> PHY/QHP DX/THER: CPT

## 2024-01-16 PROCEDURE — 84182 PROTEIN WESTERN BLOT TEST: CPT | Mod: STJLAB

## 2024-01-16 PROCEDURE — 2780000003 HC OR 278 NO HCPCS

## 2024-01-16 PROCEDURE — 99233 SBSQ HOSP IP/OBS HIGH 50: CPT | Performed by: STUDENT IN AN ORGANIZED HEALTH CARE EDUCATION/TRAINING PROGRAM

## 2024-01-16 PROCEDURE — 2020000001 HC ICU ROOM DAILY

## 2024-01-16 PROCEDURE — 36569 INSJ PICC 5 YR+ W/O IMAGING: CPT | Performed by: STUDENT IN AN ORGANIZED HEALTH CARE EDUCATION/TRAINING PROGRAM

## 2024-01-16 PROCEDURE — 37799 UNLISTED PX VASCULAR SURGERY: CPT | Performed by: STUDENT IN AN ORGANIZED HEALTH CARE EDUCATION/TRAINING PROGRAM

## 2024-01-16 PROCEDURE — P9016 RBC LEUKOCYTES REDUCED: HCPCS

## 2024-01-16 PROCEDURE — 83735 ASSAY OF MAGNESIUM: CPT

## 2024-01-16 PROCEDURE — 99223 1ST HOSP IP/OBS HIGH 75: CPT | Performed by: SPECIALIST

## 2024-01-16 PROCEDURE — 85027 COMPLETE CBC AUTOMATED: CPT

## 2024-01-16 PROCEDURE — 99222 1ST HOSP IP/OBS MODERATE 55: CPT | Performed by: INTERNAL MEDICINE

## 2024-01-16 PROCEDURE — 82607 VITAMIN B-12: CPT | Mod: STJLAB | Performed by: STUDENT IN AN ORGANIZED HEALTH CARE EDUCATION/TRAINING PROGRAM

## 2024-01-16 PROCEDURE — 85045 AUTOMATED RETICULOCYTE COUNT: CPT | Performed by: STUDENT IN AN ORGANIZED HEALTH CARE EDUCATION/TRAINING PROGRAM

## 2024-01-16 RX ORDER — LEVETIRACETAM 10 MG/ML
1000 INJECTION INTRAVASCULAR ONCE
Status: COMPLETED | OUTPATIENT
Start: 2024-01-16 | End: 2024-01-16

## 2024-01-16 RX ORDER — FENTANYL CITRATE 50 UG/ML
INJECTION, SOLUTION INTRAMUSCULAR; INTRAVENOUS
Status: COMPLETED
Start: 2024-01-16 | End: 2024-01-16

## 2024-01-16 RX ORDER — SODIUM CHLORIDE 0.9 % (FLUSH) 0.9 %
10 SYRINGE (ML) INJECTION AS NEEDED
Status: DISCONTINUED | OUTPATIENT
Start: 2024-01-16 | End: 2024-01-17 | Stop reason: HOSPADM

## 2024-01-16 RX ORDER — SODIUM CHLORIDE 0.9 % (FLUSH) 0.9 %
10 SYRINGE (ML) INJECTION AS NEEDED
Status: DISCONTINUED | OUTPATIENT
Start: 2024-01-16 | End: 2024-01-16

## 2024-01-16 RX ORDER — FENTANYL CITRATE 50 UG/ML
50 INJECTION, SOLUTION INTRAMUSCULAR; INTRAVENOUS ONCE
Status: COMPLETED | OUTPATIENT
Start: 2024-01-16 | End: 2024-01-16

## 2024-01-16 RX ORDER — SODIUM CHLORIDE 0.9 % (FLUSH) 0.9 %
10 SYRINGE (ML) INJECTION EVERY 12 HOURS
Status: DISCONTINUED | OUTPATIENT
Start: 2024-01-16 | End: 2024-01-16

## 2024-01-16 RX ORDER — VANCOMYCIN 2 GRAM/500 ML IN 0.9 % SODIUM CHLORIDE INTRAVENOUS
2000 ONCE
Status: COMPLETED | OUTPATIENT
Start: 2024-01-16 | End: 2024-01-16

## 2024-01-16 RX ORDER — ENOXAPARIN SODIUM 100 MG/ML
40 INJECTION SUBCUTANEOUS EVERY 24 HOURS
Status: DISCONTINUED | OUTPATIENT
Start: 2024-01-16 | End: 2024-01-17 | Stop reason: HOSPADM

## 2024-01-16 RX ORDER — POTASSIUM PHOSPHATE, MONOBASIC AND POTASSIUM PHOSPHATE, DIBASIC 224; 236 MG/ML; MG/ML
15 INJECTION, SOLUTION INTRAVENOUS ONCE
Status: COMPLETED | OUTPATIENT
Start: 2024-01-16 | End: 2024-01-17

## 2024-01-16 RX ORDER — MAGNESIUM SULFATE HEPTAHYDRATE 40 MG/ML
2 INJECTION, SOLUTION INTRAVENOUS ONCE
Status: COMPLETED | OUTPATIENT
Start: 2024-01-16 | End: 2024-01-16

## 2024-01-16 RX ORDER — DEXTROSE 50 % IN WATER (D50W) INTRAVENOUS SYRINGE
25
Status: DISCONTINUED | OUTPATIENT
Start: 2024-01-16 | End: 2024-01-17 | Stop reason: HOSPADM

## 2024-01-16 RX ORDER — LORAZEPAM 2 MG/ML
1 INJECTION INTRAMUSCULAR ONCE
Status: COMPLETED | OUTPATIENT
Start: 2024-01-16 | End: 2024-01-16

## 2024-01-16 RX ORDER — LEVETIRACETAM 5 MG/ML
500 INJECTION INTRAVASCULAR EVERY 12 HOURS
Status: DISCONTINUED | OUTPATIENT
Start: 2024-01-16 | End: 2024-01-17 | Stop reason: HOSPADM

## 2024-01-16 RX ORDER — SODIUM CHLORIDE 0.9 % (FLUSH) 0.9 %
10 SYRINGE (ML) INJECTION EVERY 12 HOURS
Status: DISCONTINUED | OUTPATIENT
Start: 2024-01-16 | End: 2024-01-17 | Stop reason: HOSPADM

## 2024-01-16 RX ORDER — LEVETIRACETAM 5 MG/ML
500 INJECTION INTRAVASCULAR ONCE
Status: COMPLETED | OUTPATIENT
Start: 2024-01-16 | End: 2024-01-16

## 2024-01-16 RX ORDER — LEVETIRACETAM 5 MG/ML
500 INJECTION INTRAVASCULAR ONCE
Status: DISCONTINUED | OUTPATIENT
Start: 2024-01-16 | End: 2024-01-16

## 2024-01-16 RX ORDER — CEFTRIAXONE 2 G/50ML
2 INJECTION, SOLUTION INTRAVENOUS EVERY 12 HOURS
Status: DISCONTINUED | OUTPATIENT
Start: 2024-01-16 | End: 2024-01-17 | Stop reason: HOSPADM

## 2024-01-16 RX ORDER — LIDOCAINE HYDROCHLORIDE 10 MG/ML
5 INJECTION INFILTRATION; PERINEURAL ONCE
Status: DISCONTINUED | OUTPATIENT
Start: 2024-01-16 | End: 2024-01-17 | Stop reason: HOSPADM

## 2024-01-16 RX ORDER — DEXTROSE MONOHYDRATE 100 MG/ML
0.3 INJECTION, SOLUTION INTRAVENOUS ONCE AS NEEDED
Status: DISCONTINUED | OUTPATIENT
Start: 2024-01-16 | End: 2024-01-17 | Stop reason: HOSPADM

## 2024-01-16 RX ADMIN — HYDROCORTISONE SODIUM SUCCINATE 75 MG: 100 INJECTION, POWDER, FOR SOLUTION INTRAMUSCULAR; INTRAVENOUS at 08:24

## 2024-01-16 RX ADMIN — LEVETIRACETAM 500 MG: 5 INJECTION INTRAVENOUS at 14:03

## 2024-01-16 RX ADMIN — LORAZEPAM 1 MG: 2 INJECTION, SOLUTION INTRAMUSCULAR; INTRAVENOUS at 11:01

## 2024-01-16 RX ADMIN — HYDROMORPHONE HYDROCHLORIDE 0.3 MG: 1 INJECTION, SOLUTION INTRAMUSCULAR; INTRAVENOUS; SUBCUTANEOUS at 12:04

## 2024-01-16 RX ADMIN — LORAZEPAM 1 MG: 2 INJECTION, SOLUTION INTRAMUSCULAR; INTRAVENOUS at 13:33

## 2024-01-16 RX ADMIN — AMPICILLIN SODIUM 2 G: 2 INJECTION, POWDER, FOR SOLUTION INTRAVENOUS at 23:55

## 2024-01-16 RX ADMIN — HYDROCORTISONE SODIUM SUCCINATE 75 MG: 100 INJECTION, POWDER, FOR SOLUTION INTRAMUSCULAR; INTRAVENOUS at 04:08

## 2024-01-16 RX ADMIN — ENOXAPARIN SODIUM 40 MG: 40 INJECTION SUBCUTANEOUS at 16:29

## 2024-01-16 RX ADMIN — FENTANYL CITRATE 50 MCG: 50 INJECTION, SOLUTION INTRAMUSCULAR; INTRAVENOUS at 15:56

## 2024-01-16 RX ADMIN — HYDROCORTISONE SODIUM SUCCINATE 75 MG: 100 INJECTION, POWDER, FOR SOLUTION INTRAMUSCULAR; INTRAVENOUS at 15:57

## 2024-01-16 RX ADMIN — DEXTROSE MONOHYDRATE 25 G: 25 INJECTION, SOLUTION INTRAVENOUS at 09:10

## 2024-01-16 RX ADMIN — LEVETIRACETAM 1000 MG: 10 INJECTION INTRAVENOUS at 12:58

## 2024-01-16 RX ADMIN — MAGNESIUM SULFATE HEPTAHYDRATE 2 G: 40 INJECTION, SOLUTION INTRAVENOUS at 08:24

## 2024-01-16 RX ADMIN — HYDROCORTISONE SODIUM SUCCINATE 75 MG: 100 INJECTION, POWDER, FOR SOLUTION INTRAMUSCULAR; INTRAVENOUS at 21:06

## 2024-01-16 RX ADMIN — LEVETIRACETAM 500 MG: 5 INJECTION INTRAVENOUS at 23:06

## 2024-01-16 RX ADMIN — AMPICILLIN SODIUM 2 G: 2 INJECTION, POWDER, FOR SOLUTION INTRAVENOUS at 21:03

## 2024-01-16 RX ADMIN — Medication 2000 MG: at 19:27

## 2024-01-16 RX ADMIN — CEFTRIAXONE SODIUM 2 G: 2 INJECTION, SOLUTION INTRAVENOUS at 21:06

## 2024-01-16 RX ADMIN — ACYCLOVIR SODIUM 650 MG: 50 INJECTION, SOLUTION INTRAVENOUS at 19:27

## 2024-01-16 RX ADMIN — DEXTROSE MONOHYDRATE 1000 ML: 50 INJECTION, SOLUTION INTRAVENOUS at 16:29

## 2024-01-16 RX ADMIN — DEXTROSE MONOHYDRATE 1000 ML: 50 INJECTION, SOLUTION INTRAVENOUS at 12:04

## 2024-01-16 RX ADMIN — POTASSIUM PHOSPHATE, MONOBASIC POTASSIUM PHOSPHATE, DIBASIC 15 MMOL: 224; 236 INJECTION, SOLUTION, CONCENTRATE INTRAVENOUS at 22:28

## 2024-01-16 ASSESSMENT — COGNITIVE AND FUNCTIONAL STATUS - GENERAL
MOBILITY SCORE: 6
DRESSING REGULAR LOWER BODY CLOTHING: TOTAL
MOVING TO AND FROM BED TO CHAIR: TOTAL
EATING MEALS: TOTAL
DRESSING REGULAR LOWER BODY CLOTHING: TOTAL
MOBILITY SCORE: 7
TOILETING: TOTAL
CLIMB 3 TO 5 STEPS WITH RAILING: TOTAL
MOVING TO AND FROM BED TO CHAIR: TOTAL
DRESSING REGULAR LOWER BODY CLOTHING: TOTAL
STANDING UP FROM CHAIR USING ARMS: TOTAL
EATING MEALS: TOTAL
DAILY ACTIVITIY SCORE: 6
CLIMB 3 TO 5 STEPS WITH RAILING: TOTAL
CLIMB 3 TO 5 STEPS WITH RAILING: TOTAL
DRESSING REGULAR UPPER BODY CLOTHING: TOTAL
WALKING IN HOSPITAL ROOM: TOTAL
DRESSING REGULAR UPPER BODY CLOTHING: TOTAL
STANDING UP FROM CHAIR USING ARMS: TOTAL
WALKING IN HOSPITAL ROOM: TOTAL
DRESSING REGULAR UPPER BODY CLOTHING: TOTAL
PERSONAL GROOMING: TOTAL
EATING MEALS: TOTAL
TURNING FROM BACK TO SIDE WHILE IN FLAT BAD: TOTAL
MOVING FROM LYING ON BACK TO SITTING ON SIDE OF FLAT BED WITH BEDRAILS: TOTAL
PERSONAL GROOMING: TOTAL
HELP NEEDED FOR BATHING: TOTAL
STANDING UP FROM CHAIR USING ARMS: TOTAL
MOVING FROM LYING ON BACK TO SITTING ON SIDE OF FLAT BED WITH BEDRAILS: TOTAL
DAILY ACTIVITIY SCORE: 6
MOBILITY SCORE: 6
TOILETING: TOTAL
TURNING FROM BACK TO SIDE WHILE IN FLAT BAD: TOTAL
MOVING TO AND FROM BED TO CHAIR: TOTAL
MOVING FROM LYING ON BACK TO SITTING ON SIDE OF FLAT BED WITH BEDRAILS: A LOT
TOILETING: TOTAL
WALKING IN HOSPITAL ROOM: TOTAL
HELP NEEDED FOR BATHING: TOTAL
DAILY ACTIVITIY SCORE: 6
TURNING FROM BACK TO SIDE WHILE IN FLAT BAD: TOTAL
PERSONAL GROOMING: TOTAL
HELP NEEDED FOR BATHING: TOTAL

## 2024-01-16 ASSESSMENT — PAIN - FUNCTIONAL ASSESSMENT
PAIN_FUNCTIONAL_ASSESSMENT: CPOT (CRITICAL CARE PAIN OBSERVATION TOOL)
PAIN_FUNCTIONAL_ASSESSMENT: CPOT (CRITICAL CARE PAIN OBSERVATION TOOL)

## 2024-01-16 ASSESSMENT — PAIN SCALES - GENERAL: PAINLEVEL_OUTOF10: 0 - NO PAIN

## 2024-01-16 NOTE — CARE PLAN
Attending addendum to today's progress note:    Patient has clinically declined overnight.  Patient is oriented x 0 this morning.  She is no longer nodding yes or no to questioning.  She is diffusely sensitive to light palpation with notable grimacing on palpation of face, arms and legs.  She does move all 4 extremities.  Does not follow commands today.  Does not verbalize answers to questions.  This is a marked decline from yesterday.  This morning on evaluation noted diffuse facial twitching bilateral around eyes and mouth.  Very faint bilateral hand twitching noted as well.  Patient has no prior history of seizure disorder.  Neuro team urgently contacted for evaluation prior to administration of Ativan.  Neuro team agreed this is suspected seizure activity and patient was given Ativan and started on twice daily Keppra.  In addition neurology ordered video EEG.      This writer contacted patient's son this AM and gave a detailed outline of her clinical course. Lengthy discussion with son today.  Discussed with son that patient's current medical condition is the worst that I have seen her over the course of the multiple other times I have cared for Ms. Holliday.  We discussed that she has a tenuous clinical course and at this time requires further neuro workup.  MRI results reviewed.  Sodium results reviewed.  In addition this writer introduced the idea of palliative care and son is in agreement to speak with Palliative Care Team.  This writer also urged son to contact other family members as well.    Early afternoon neurology attending contacted this writer to discuss the fact there is concern that patient is in status epilepticus.  Neurology recommends transfer to ICU for further antiseizure medication titration.  In addition given that patient has history of lupus and there is concern this may be an underlying etiology for current presentation and for seizures, neurology recommended transfer to [facility] to neuro ICU for  video EEG, LP and rheumatology evaluation.    This writer discussed case with [facility] ICU attending who has accepted patient to transfer.  In addition medical team will also contact [facility] for transfer initiation process.  Patient has multifactorial etiology of continued encephalopathy including new onset seizure, hypernatremia, adrenal insufficiency, infection not yet fully ruled out as stool studies are still pending. Guarded prognosis.    At the time of dictation of this note, this writer has discussed case with neuro ICU attending who does not feel that patient requires neuro ICU at this time and requested transfer center connect this writer with MICU attending.  Awaiting callback from [facility] MICU attending.    Patient's son updated by this writer regarding this afternoon updated neuro recommendations. Son is in agreement for transfer.

## 2024-01-16 NOTE — PROGRESS NOTES
Physical Therapy                 Therapy Communication Note    Patient Name: Bing Holliday  MRN: [id number]  Today's Date: 1/16/2024     Discipline: Physical Therapy    Missed Visit Reason: PT order received, chart reviewed. Per RN pt is a medical hold at this time. Will reattempt PT evaluation as appropriate.     Missed Time: Attempt

## 2024-01-16 NOTE — PROGRESS NOTES
Occupational Therapy                 Therapy Communication Note    Patient Name: Bing Holliday  MRN: [id number]  Today's Date: 1/16/2024     Discipline: Occupational Therapy    Missed Visit Reason: Missed Visit Reason: Patient placed on medical hold    Missed Time: Attempt    Comment: Received orders for OT eval 1/15. Completed chart review, and spoke with pt's RN. RN would like therapy to hold at this time, and return at a later time.

## 2024-01-16 NOTE — SIGNIFICANT EVENT
Preliminary EEG read showed diffuse severe encephalopathy. This study is limited due to EMG artifact but no seizure activity recorded.   This report is until 1730    Final report will be given tomorrow.Please call with questions.       Iván Leonard MD   Epilepsy Fellow

## 2024-01-16 NOTE — CONSULTS
Critical Care Medicine History and Physical        Subjective   Patient is a 63 y.o. female admitted on 1/14/2024  1:58 PM  with chief complaint of weakness and confusion. Critical care management was consulted due to concerns for status epilepticus.      HPI:  63-year-old female initially presented to [facility] on 1/14/24 due to confusion and weakness. 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 Patient is significantly altered and unable to cooperate with examination or answer questions, thus most of this history is obtained from chart review. She has had multiple recurrent hospitalizations secondary to hypoglycemia and AMS, of most recent here at [facility] from 12/17 - 12/21/23. She was discharged to a SNF, and returned home to her son recently. Over the last several days at home, she has gotten progressively weaker and confused, with several recurrent episodes of going to the bathroom (although son is unaware if she had any diarrhea or not). Hospital course thus far with several episodes of hypoglycemia requiring D50 administration. She was initially encephalopathic, but responsive to questions, about A&Ox1 to self. However, today, was noted that she was significantly altered, with facial and bilateral upper extremity twitching. Labwork was significant for a hypernatremia of 152. D5W was initiated. MRI brain w/wo contrast obtained 1/15 demonstrated new patchy areas of nonspecific demyelination. Was evaluated by neurology Dr. 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 She was loaded with IV Keppra 1g, continued on 500mg BID. She did receive Ativan 1mg x2. Patient was transferred to the ICU for vEEG monitoring and due to concerns for status epilepticus.  Transfer to [facility] neuro ICU was initiated by primary team, pending  callback.    Patient was evaluated at bedside this afternoon upon arrival to the ICU.  She is encephalopathic, not responding to questions, not following commands.  She has significant pain to minimal touch, groaning and crying.  No observed twitching of the face or upper extremities at this time.  Extensive discussion had with son at bedside in regards to treatment plan, including video EEG monitoring, lumbar puncture to rule out encephalitis. Primary did reach back and stated that patient was accepted to [facility].    Past Medical History:   Diagnosis Date    Acute upper respiratory infection, unspecified 03/04/2020    Acute URI    Acute upper respiratory infection, unspecified 09/30/2015    URTI (acute upper respiratory infection)    Arthritis     Body mass index (BMI) 23.0-23.9, adult 10/15/2021    BMI 23.0-23.9, adult    Body mass index (BMI) 33.0-33.9, adult 03/04/2020    BMI 33.0-33.9,adult    Cardiomegaly 08/27/2013    Left ventricular hypertrophy    Chronic kidney disease, stage 3 unspecified (CMS/HCC) 07/02/2013    Chronic kidney disease, stage III (moderate)    Disease of pericardium, unspecified 07/02/2013    Pericardial disease    Encounter for follow-up examination after completed treatment for conditions other than malignant neoplasm 10/06/2022    Hospital discharge follow-up    Generalized contraction of visual field, right eye 01/29/2015    Generalized contraction of visual field of right eye    Homonymous bilateral field defects, right side 04/29/2016    Homonymous bilateral field defects of right side    Hypertensive chronic kidney disease with stage 1 through stage 4 chronic kidney disease, or unspecified chronic kidney disease 07/02/2013    Nephrosclerosis    Laceration without foreign body, left foot, initial encounter 07/03/2018    Foot laceration, left, initial encounter    Migraine with aura, not intractable, without status migrainosus 10/24/2022    Ocular migraine    Other conditions  influencing health status 07/02/2013    Chronic Glomerulonephritis In Diseases Classified Elsewhere    Other conditions influencing health status 07/02/2013    Progressive Familial Myoclonic Epilepsy    Other conditions influencing health status 07/02/2013    Protein S Deficiency    Other conditions influencing health status 05/22/2015    Familial Combined Hyperlipidemia    Other conditions influencing health status 10/24/2022    IDDM (insulin dependent diabetes mellitus)    Other conditions influencing health status 03/14/2022    Diabetes mellitus, insulin dependent (IDDM), uncontrolled    Other long term (current) drug therapy 10/24/2022    Long-term use of Plaquenil    Personal history of diseases of the blood and blood-forming organs and certain disorders involving the immune mechanism 07/02/2013    History of thrombocytopenia    Personal history of diseases of the skin and subcutaneous tissue 08/11/2015    History of foot ulcer    Personal history of nephrotic syndrome 07/02/2013    History of nephrotic syndrome    Personal history of other diseases of the circulatory system 08/27/2013    History of sinus tachycardia    Personal history of other diseases of the nervous system and sense organs     History of cataract    Personal history of other diseases of the respiratory system     History of bronchitis    Personal history of other infectious and parasitic diseases 07/02/2013    History of hepatitis    Personal history of other specified conditions     History of shortness of breath    Personal history of other specified conditions 08/27/2013    History of edema    Puckering of macula, right eye 10/24/2022    ERM OD (epiretinal membrane, right eye)    Raynaud's syndrome without gangrene 07/02/2013    Raynaud's disease    Systemic lupus erythematosus, unspecified (CMS/HCC) 07/24/2015    SLE (systemic lupus erythematosus)    Systemic lupus erythematosus, unspecified (CMS/HCC) 07/24/2015    SLE (systemic lupus  erythematosus)    Systemic lupus erythematosus, unspecified (CMS/HCC) 07/24/2015    Systemic lupus    Type 2 diabetes mellitus with diabetic nephropathy (CMS/HCC) 07/02/2013    Type 2 diabetes with nephropathy    Type 2 diabetes mellitus with mild nonproliferative diabetic retinopathy without macular edema, left eye (CMS/HCC) 07/27/2015    Non-proliferative diabetic retinopathy, left eye    Type 2 diabetes mellitus with mild nonproliferative diabetic retinopathy without macular edema, unspecified eye (CMS/HCC) 07/24/2015    Mild non proliferative diabetic retinopathy    Type 2 diabetes mellitus with proliferative diabetic retinopathy without macular edema, right eye (CMS/HCC) 07/27/2015    Proliferative diabetic retinopathy of right eye    Type 2 diabetes mellitus with proliferative diabetic retinopathy without macular edema, unspecified eye (CMS/HCC) 07/24/2015    Diabetic proliferative retinopathy    Unspecified acute and subacute iridocyclitis 07/24/2015    Acute iritis, right eye    Unspecified open wound, left foot, sequela 07/03/2018    Wound, open, foot, left, sequela     Past Surgical History:   Procedure Laterality Date    ANKLE SURGERY  01/29/2015    Ankle Surgery    CHOLECYSTECTOMY  01/29/2015    Cholecystectomy    CT GUIDED PERCUTANEOUS BIOPSY BONE DEEP  5/4/2021    CT GUIDED PERCUTANEOUS BIOPSY BONE DEEP 5/4/2021 [facility] CLINICAL LEGACY    EYE SURGERY  03/06/2015    Eye Surgery    FOOT SURGERY  01/29/2015    Foot Surgery    MR HEAD ANGIO WO IV CONTRAST  7/26/2013    MR HEAD ANGIO WO IV CONTRAST 7/26/2013 [facility] CLINICAL LEGACY    MR HEAD ANGIO WO IV CONTRAST  9/17/2021    MR HEAD ANGIO WO IV CONTRAST 9/17/2021 [facility]    MR HEAD ANGIO WO IV CONTRAST  3/25/2023    MR HEAD ANGIO WO IV CONTRAST [facility]    MR NECK ANGIO WO IV CONTRAST  7/26/2013    MR NECK ANGIO WO IV CONTRAST 7/26/2013 [facility] CLINICAL LEGACY    MR NECK ANGIO WO IV CONTRAST  9/17/2021    MR NECK ANGIO WO IV CONTRAST 9/17/2021 [facility]  EMERGENCY LEGACY    MR NECK ANGIO WO IV CONTRAST  3/25/2023    MR NECK ANGIO WO IV CONTRAST [facility]    OTHER SURGICAL HISTORY  01/29/2015    Creation Of Pericardial Window    OTHER SURGICAL HISTORY  01/29/2015    Quadricepsplasty    TOTAL HIP ARTHROPLASTY  01/29/2015    Hip Replacement     Medications Prior to Admission   Medication Sig Dispense Refill Last Dose    acetaminophen (Tylenol) 325 mg tablet Take 2 tablets (650 mg) by mouth every 4 hours if needed for mild pain (1 - 3).   1/14/2024    amLODIPine (Norvasc) 2.5 mg tablet Take 1 tablet (2.5 mg) by mouth once daily.   1/14/2024    atorvastatin (Lipitor) 40 mg tablet Take 1 tablet (40 mg) by mouth once daily. 90 tablet 3 1/14/2024    belimumab (Benlysta) 400 mg recon soln IV injection Infuse 10 mg/kg into a venous catheter every 28 (twenty-eight) days.  (900mg per dose)   Past Month    blood-glucose sensor (Dexcom G6 Sensor) device Use to check sugars 3 times daily 4 each 2     Ca carb-D3-mag [id number]-Zn 300 mg-20 mcg- 25 mg-0.5 mg tablet Take 1 tablet by mouth 2 times a day.   1/14/2024    Dexcom G4 platinum  (Dexcom G6 ) misc Use as instructed 1 each 0     Dexcom G4 platinum transmitter (Dexcom G6 Transmitter) device Use as instructed 1 each 0     fluticasone-umeclidin-vilanter (TRELEGY-ELLIPTA) 200-62.5-25 mcg blister with device Inhale 1 puff once daily. 60 each 5 1/14/2024    folic acid (Folvite) 1 mg tablet TAKE 1 TABLET BY MOUTH EVERY DAY 90 tablet 1 1/14/2024    magnesium oxide (Mag-Ox) 400 mg tablet Take 1 tablet (400 mg) by mouth once daily.   1/14/2024    melatonin 5 mg tablet Take 1 tablet (5 mg) by mouth once daily at bedtime.       multivitamin tablet Take 1 tablet by mouth once daily.   1/14/2024    mycophenolate (Cellcept) 500 mg tablet Take 2 tablets (1,000 mg) by mouth twice a day.   1/14/2024    pantoprazole (ProtoNix) 40 mg EC tablet TAKE 1 TABLET BY MOUTH EVERY DAY 90 tablet 1 1/14/2024    predniSONE (Deltasone) 10 mg  tablet Take 1 tablet (10 mg) by mouth once daily in the morning. Take first thing when you wake up every morning. 30 tablet 2 1/14/2024    predniSONE (Deltasone) 5 mg tablet Take 1 tablet (5 mg) by mouth see administration instructions. Take one tablet everyday at 2PM scheduled 30 tablet 2 1/14/2024    risperiDONE (RisperDAL) 0.5 mg tablet TAKE 1 TABLET BY MOUTH AT BEDTIME (Patient not taking: Reported on 1/15/2024) 30 tablet 0 Not Taking    torsemide (Demadex) 10 mg tablet TAKE 1 TABLET BY MOUTH EVERY DAY (Patient not taking: Reported on 1/15/2024) 90 tablet 0 Not Taking     Ace inhibitors, Hydroxychloroquine, Lisinopril, Penicillins, and Sulfa (sulfonamide antibiotics)  Social History     Tobacco Use    Smoking status: Never    Smokeless tobacco: Never   Vaping Use    Vaping Use: Never used   Substance Use Topics    Alcohol use: Never    Drug use: Never     Family History   Problem Relation Name Age of Onset    Other (RENAL DISEASE) Mother          END STAGE    Other (CARDIAC DISORDER) Mother      Cataracts Mother      Stroke Mother      Diabetes Mother      Kidney disease Mother      Lupus Mother      Other (CARDIAC DISORDER) Father      COPD Father      Glaucoma Father      Hypertension Father      Sleep apnea Father      Other (CARDIAC DISORDER) Sister      Depression Sister      Kidney disease Sister      Sickle cell trait Sister      Sleep apnea Daughter         Scheduled Medications:   apixaban, 2.5 mg, oral, BID  atorvastatin, 40 mg, oral, Daily  dextrose, 1,000 mL, intravenous, q6h  folic acid, 1 mg, oral, Daily  hydrocortisone sodium succinate, 75 mg, intravenous, q6h  levETIRAcetam, 500 mg, intravenous, q12h  lidocaine, 5 mL, infiltration, Once  mycophenolate, 1,000 mg, oral, BID  sodium chloride 0.9%, 10 mL, intra-catheter, q12h         Continuous Medications:   Adult Clinimix Parenteral Nutrition Continuous, 70 mL/hr         PRN Medications:   PRN medications: acetaminophen, alteplase, dextrose 10 % in  water (D10W), dextrose, glucagon, sodium chloride 0.9%    Review of Systems: Unable to evaluate review of systems due to patient uncooperative and unresponsive    Objective   Vitals:  24hr Min/Max:  Temp  Min: 35.9 °C (96.6 °F)  Max: 36.8 °C (98.2 °F)  Pulse  Min: 50  Max: 102  BP  Min: 113/55  Max: 158/81  Resp  Min: 11  Max: 29  SpO2  Min: 94 %  Max: 99 %    Physical exam:    Constitutional: Comfortable in bed, however groaning and crying in pain with minimal touch, pain out of proportion to examination,  does not respond to questioning, does not follow commands  Skin: Warm and dry, no lesions, no rashes  Eyes: Anicteric, clear sclera  ENMT: mucous membranes moist, no apparent injury, no lesions seen  Head/Neck: Atraumatic  Respiratory: Lungs clear to auscultation bilaterally with no wheezes, rhonci or crackles  CV: Regular rate and rhythm, no murmurs or gallops auscultated  GI: Non-tender to deep palpation, no guarding  MSK: Chronic bilateral hand joint deformities secondary to Jaccoud's arthropathy  Extremities: 2+ pitting edema bilateral lower extremities  Psych: Encephalopathic    Assessment /Plan      63-year-old female initially presenting on 1/14/2024 due to confusion and weakness.  Was transferred to ICU on 1/16/2024 due to concerns for possible status epilepticus.  New MRI finding of nonspecific demyelination concerning for lupus related manifestation versus encephalitis.  Significant history of SLE on CellCept, adrenal insufficiency on prednisone, CKD, A-fib on Eliquis.  Neurology on consult, and pending possible transfer to neuro ICU at [facility] for further evaluation by rheumatology.    # Acute encephalopathy, neuropsychiatric SLE vs ?status epilepticus vs ?encephalitis  # SLE, on Cellcept and prednisone  # Acute hypernatremia   # Adrenal insufficiency, complicated by persistent hypoglycemia  # Allodynia, generalized diffuse myalgias  # Acute anemia on chronic pancytopenia  # Diarrhea, r/o C.  Difficile  # A-fib, on Eliquis  # Chronic sacral wound  # Hx of CABG 2013  # Hx of AAA  # HLD    Neuro  Rheum:   -History of SLE, on CellCept 1 g twice daily, prednisone 15 mg daily  -MRI brain w/wo 1/15 demonstrating nonspecific new demyelination, possible neuropsychiatric SLE demyelination?  -Concern for status epilepticus in setting of ongoing encephalopathy with prior facial and bilateral upper extremity twitching.    -Loaded with Keppra 1 g, continuing 500 mg twice daily  -Holding CellCept at this time, attempted to reach out to her rheumatologist Dr. Castellanos for recommendations  -Video EEG to rule out status ellipticus  -LP ordered to rule out encephalitis, unable to be obtained at this time as patient recently received p.o. Eliquis afternoon of 1/15/2024  -Neurology consulted, appreciate recommendations  -Accepted for transfer to [facility] for evaluation by rheumatology    Cardiac:   -Continue to monitor blood pressure, goal MAP greater than 65  -A.Fib on Eliquis, most recent dose 1/15 afternoon. Will hold at this time due to unable to participate in PO intake and planned LP  -Primary team ordered PICC line placement due to difficulty obtaining labdraws    Pulmonary:   -On room air, currently protecting airway at this time, continue to monitor continuous pulse oximetry  -Of note, patient is DO NOT INTUBATE    Gastrointestinal:   -Initial concerns for possible diarrhea, will continue to monitor stool output  -Initial C. difficile PCR was ordered, pending collection  -Primary team initially ordered PPN and IR to place PICC to administer, not started at this time. Would prefer CorPak with enteral nutrition given immunocompromised status, discussed with son López and he is agreeable with placement  -Significant protein malnutrition, dietary on consult  - Last BM: Last BM Date: 01/16/24    Renal:   -Has baseline creatinine of CKD 3 with baseline creatinine of 1.9.  Current creatinine stable, good urine  output  -Acute hypernatremia of 152, received 2L D5W prior to arrival to ICU, with corrected to Na 146. Will bolus another 1L D5W at this time. Free water deficit of 3.3L. Goal to achieve Na <145. Consider maintenance IVF thereafter  -q4hr RFP's  Net IO Since Admission: -100 mL [01/16/24 1547]  Results from last 72 hours   Lab Units 01/16/24  1439 01/16/24  1204   BUN mg/dL 31* 31*   CREATININE mg/dL 1.61* 1.65*        Endocrine:   -Adrenal insuffiencey. Continue stress dose hydrocortisone 75mg q6hrs (received 100mg x1 on 1/14)  -Persistent hypoglycemia, received multiple D50 administrations throughout hospital course. Continue q2hrs glucose checks, with hypoglycemia protocol in place  Results from last 7 days   Lab Units 01/16/24  1449 01/16/24  1439 01/16/24  1204 01/16/24  1024 01/16/24  1023 01/16/24  0839 01/16/24  0555 01/16/24  0401 01/16/24  0044   POCT GLUCOSE mg/dL 182*  --   --  118* 23* 61*  --  92 95   GLUCOSE mg/dL  --  223* 146*  --   --   --    < >  --   --     < > = values in this interval not displayed.        Hematology:   -Chronic pancytopenia, with baseline Hg of ~9  -Acute anemia of 6.5, possibly dilutional from IVF boluses. Consent obtained for transfusion, 1u pRBC ordered  -Eliquis held at this time for upcoming LP and unable to tolerate po intake. Prophylactic lovenox ordered  -Retic count wnl, not appropriately elevated. No signs of acute hemorrhage   -Vitamin b12, folate, iron panel, and ferritin ordered  Results from last 7 days   Lab Units 01/16/24  1439 01/16/24  0555 01/15/24  1806 01/15/24  0834   HEMOGLOBIN g/dL 6.5* 7.0*   < > 7.0*   HEMATOCRIT % 21.7* 24.3*   < > 24.1*   PLATELETS AUTO x10*3/uL  --  76*  --  69*    < > = values in this interval not displayed.       Infectious Disease:   -Immunocompromised state given patient is on cellcept and chronic prednisone  -No signs of acute infection at this time. Afebrile. Lactate wnl.  -Will initiate empiric meningitis/encephalitis  coverage at this time: ampicillin 2g q4hrs, Rocephin 2g BID, vancomycin, and IV acyclovir 10mg/kg BID  -CBC differential added on  -Anticipate LP tomorrow,    -C.Diff PCR ordered  -blood cultures  pending  -infectious disease consulted, appreciate recommendations  Results from last 7 days   Lab Units 24  0555 01/15/24  0834   WBC AUTO x10*3/uL 3.5* 3.8*     Temp (24hrs), Av.2 °C (97.1 °F), Min:35.9 °C (96.6 °F), Max:36.8 °C (98.2 °F)         LDA:   Urethral Catheter Double-lumen 16 Fr. (Active)   Placement Date/Time: 24 1500   Placed by: shanelle  Hand Hygiene Completed: Yes  Catheter Type: Double-lumen  Tube Size (Fr.): 16 Fr.  Catheter Balloon Size: 10 mL  Urine Returned: Yes   Number of days: 0         CODE STATUS: DNR and No Intubation    Dispo: Undergoing vEEG. Plan for possible LP tomorrow. Was accepted for transfer to [facility] MICU.    To be staffed with attending,  López Pyle DO  Internal Medicine PGY-3

## 2024-01-16 NOTE — CONSULTS
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    History Of Present Illness  Bing Holliday is a 63 y.o. female presenting with generalized weakness, urinary tract infection.      Past Medical History  She has a past medical history of Acute upper respiratory infection, unspecified (03/04/2020), Acute upper respiratory infection, unspecified (09/30/2015), Arthritis, Body mass index (BMI) 23.0-23.9, adult (10/15/2021), Body mass index (BMI) 33.0-33.9, adult (03/04/2020), Cardiomegaly (08/27/2013), Chronic kidney disease, stage 3 unspecified (CMS/[facility], located within [facility]) (07/02/2013), Disease of pericardium, unspecified (07/02/2013), Encounter for follow-up examination after completed treatment for conditions other than malignant neoplasm (10/06/2022), Generalized contraction of visual field, right eye (01/29/2015), Homonymous bilateral field defects, right side (04/29/2016), Hypertensive chronic kidney disease with stage 1 through stage 4 chronic kidney disease, or unspecified chronic kidney disease (07/02/2013), Laceration without foreign body, left foot, initial encounter (07/03/2018), Migraine with aura, not intractable, without status migrainosus (10/24/2022), Other conditions influencing health status (07/02/2013), Other conditions influencing health status (07/02/2013), Other conditions influencing health status (07/02/2013), Other conditions influencing health status (05/22/2015), Other conditions influencing health status (10/24/2022), Other conditions influencing health status (03/14/2022), Other long term (current) drug therapy (10/24/2022), Personal history of diseases of the blood and blood-forming organs and certain disorders involving the immune mechanism (07/02/2013), Personal history of diseases of the skin and subcutaneous tissue (08/11/2015), Personal history  of nephrotic syndrome (07/02/2013), Personal history of other diseases of the circulatory system (08/27/2013), Personal history of other diseases of the nervous system and sense organs, Personal history of other diseases of the respiratory system, Personal history of other infectious and parasitic diseases (07/02/2013), Personal history of other specified conditions, Personal history of other specified conditions (08/27/2013), Puckering of macula, right eye (10/24/2022), Raynaud's syndrome without gangrene (07/02/2013), Systemic lupus erythematosus, unspecified (CMS/[facility]) (07/24/2015), Systemic lupus erythematosus, unspecified (CMS/HCC) (07/24/2015), Systemic lupus erythematosus, unspecified (CMS/[facility]) (07/24/2015), Type 2 diabetes mellitus with diabetic nephropathy (CMS/[facility]) (07/02/2013), Type 2 diabetes mellitus with mild nonproliferative diabetic retinopathy without macular edema, left eye (CMS/[facility]) (07/27/2015), Type 2 diabetes mellitus with mild nonproliferative diabetic retinopathy without macular edema, unspecified eye (CMS/[facility]) (07/24/2015), Type 2 diabetes mellitus with proliferative diabetic retinopathy without macular edema, right eye (CMS/[facility]) (07/27/2015), Type 2 diabetes mellitus with proliferative diabetic retinopathy without macular edema, unspecified eye (CMS/[facility]) (07/24/2015), Unspecified acute and subacute iridocyclitis (07/24/2015), and Unspecified open wound, left foot, sequela (07/03/2018).    Surgical History  She has a past surgical history that includes Eye surgery (03/06/2015); Total hip arthroplasty (01/29/2015); Cholecystectomy (01/29/2015); Other surgical history (01/29/2015); Other surgical history (01/29/2015); Ankle surgery (01/29/2015); Foot surgery (01/29/2015); MR angio head wo IV contrast (7/26/2013); MR angio neck wo IV contrast (7/26/2013); CT guided percutaneous biopsy bone deep (5/4/2021); MR angio head wo IV contrast (9/17/2021); MR angio neck wo IV contrast (9/17/2021); MR  angio neck wo IV contrast (3/25/2023); and MR angio head wo IV contrast (3/25/2023).     Social History  She reports that she has never smoked. She has never used smokeless tobacco. She reports that she does not drink alcohol and does not use drugs.    Family History  Family History   Problem Relation Name Age of Onset    Other (RENAL DISEASE) Mother          END STAGE    Other (CARDIAC DISORDER) Mother      Cataracts Mother      Stroke Mother      Diabetes Mother      Kidney disease Mother      Lupus Mother      Other (CARDIAC DISORDER) Father      COPD Father      Glaucoma Father      Hypertension Father      Sleep apnea Father      Other (CARDIAC DISORDER) Sister      Depression Sister      Kidney disease Sister      Sickle cell trait Sister      Sleep apnea Daughter          Allergies  Ace inhibitors, Hydroxychloroquine, Lisinopril, Penicillins, and Sulfa (sulfonamide antibiotics)      Assessment/Plan     Palliative care consulted for goals of care discussion in the setting of pts decline and overall poor prognosis. Pt A/O x 0, unable to participate in goals of care discussion. Phone call placed to pts son López Lang, no answer.  left.      Spoke with pts son over the phone. Introduced palliative care. López is planning to call family and come to the hospital later today. After he is able to visit pt and discuss with family he feels he will have a better idea of what he would like to do for pt moving forward.  Dr. Dee notified    Will continue to follow      Ainsley Aviles RN

## 2024-01-16 NOTE — CONSULTS
Nutrition Assessment Note  Nutrition Assessment      Reason for Assessment  Reason for Assessment: Provider consult order    Nutrition Note:  Bing Holliday is a 63 y.o. female presenting 1/14 with back pain, confusion, generalized weakness, diarrhea  x2-3 days. Work up revealing adrenal insufficiency and r/o C diff; stool cultures are pending. Length of stay complicated by possible seizure activity 1/16 with Neurology consult and EEG pending.     Past Medical History  11/20-11/27/2023 with hypoglycemia and Norovirus.   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 with hx of medication noncompliance), paroxsymal Afib (on Eliquis), HTN, COPD, GERD,     Surgical History   has a past surgical history that includes Eye surgery (03/06/2015); Total hip arthroplasty (01/29/2015); Cholecystectomy (01/29/2015); Other surgical history (01/29/2015); Other surgical history (01/29/2015); Ankle surgery (01/29/2015); Foot surgery (01/29/2015); MR angio head wo IV contrast (7/26/2013); MR angio neck wo IV contrast (7/26/2013); CT guided percutaneous biopsy bone deep (5/4/2021); MR angio head wo IV contrast (9/17/2021); MR angio neck wo IV contrast (9/17/2021); MR angio neck wo IV contrast (3/25/2023); and MR angio head wo IV contrast (3/25/2023).     Food and Nutrient History  Food and Nutrient History: Pt from home with family. Attempted to interview pt however when attempted to awaken pt, pt with fluttering of her eyes and lip smacking--staff aware with Neurology eval and EEG pending.  Pt has been NPO since 1/14 with report of progressive weakness PTA.  Vitamin/Herbal Supplement Use: home meds include cellcept, prednisone, benlysta, calcium/D3/mag ox/manganese/zinc, folvite, MVI       Current Diet: NPO Diet; Effective now  Food allergies: NKFA. is allergic to ace inhibitors, hydroxychloroquine, lisinopril, penicillins, and sulfa (sulfonamide antibiotics).    GI  "assessment: Pt reports diarrhea. Loose stool 1/15--cultures pending  Oral Problems: Pt too lethargic to eat/drink  Mobility: generalized weakness PTA    Pain Assessment: 0-10  Pain Score: 0 - No pain    Vitals: Blood pressure (!) 127/97, pulse 69, temperature 36.1 °C (97 °F), temperature source Temporal, resp. rate 18, height 1.727 m (5' 7.99\"), weight 93.2 kg (205 lb 7.5 oz), SpO2 98 %.    Recent Lab Results:  Lab Results   Component Value Date    GLUCOSE 146 (H) 01/16/2024    CALCIUM 8.1 (L) 01/16/2024     (H) 01/16/2024    K 4.2 01/16/2024    CO2 25 01/16/2024     (H) 01/16/2024    BUN 31 (H) 01/16/2024    CREATININE 1.65 (H) 01/16/2024     Lab Results   Component Value Date    HGBA1C 6.9 (A) 07/12/2023    HGBA1C 6.3 (A) 03/22/2023     Results from last 7 days   Lab Units 01/16/24  1024 01/16/24  1023 01/16/24  0839 01/16/24  0401 01/16/24  0044 01/15/24  2215 01/15/24  0418 01/14/24  2338   POCT GLUCOSE mg/dL 118* 23* 61* 92 95 94 150* 91       Medications reviewed:  apixaban, 2.5 mg, oral, BID  atorvastatin, 40 mg, oral, Daily  dextrose, 1,000 mL, intravenous, q6h  folic acid, 1 mg, oral, Daily  hydrocortisone sodium succinate, 75 mg, intravenous, q6h  levETIRAcetam, 500 mg, intravenous, q12h  lidocaine, 5 mL, infiltration, Once  mycophenolate, 1,000 mg, oral, BID  polyethylene glycol, 17 g, oral, Daily  sodium chloride 0.9%, 10 mL, intra-catheter, q12h  sodium chloride 0.9%, 10 mL, intra-catheter, q12h             Height: 172.7cm  IBW 63.6kg  Admission Weight: 93 kg (205 lb 0.4 oz)   BMI 31.3kg/m2  Weight history/ % weight change:   11/2023: 94.6kg ~2% x2 months  10/2023: 94.5kg 1.6% x3 months  5/2023: 93.8kg  3/2023: 90.6kg  Significant Weight Loss: no                                                                         Estimated Energy Needs  Total Energy Estimated Needs (kCal):  (1900-2300kcal (21-25kcal/kg admit wt of 93kg))    Estimated Protein Needs  Total Protein Estimated Needs (g):  " (83-102g pro (1.3-1.6g/kg IBW of 63.6kg))    Estimated Fluid Needs  Total Fluid Estimated Needs (mL):  (1mL/kcal/d or as per physician)         Nutrition Focused Physical Findings:  Subcutaneous Fat Loss  Orbital Fat Pads: Mild-Moderate (slight dark circles and slight hollowing)  Buccal Fat Pads: Mild-Moderate (flat cheeks, minimal bounce)    Muscle Wasting  Temporalis: Mild-Moderate (slight depression)  Pectoralis (Clavicular Region): Well nourished (clavicle not visible)  Deltoid/Trapezius: Well nourished (rounded appearance at arm, shoulder, neck)  Interosseous: Well nourished (muscle bulges)  Quadriceps: Well nourished (well developed, well rounded)    Edema  Edema: +1 trace, +2 mild  Edema Location: 2+ RLE, 1+ LLE         Physical Findings (Nutrition Deficiency/Toxicity)  Skin: Positive (left breast wound, coccyx and right buttock stage II--wound care on consult.)       Nutrition Diagnosis        Patient has Nutrition Diagnosis: Yes  Nutrition Diagnosis 1: Inadequate oral intake  Diagnosis Status (1): New  Related to (1): altered mental status  As Evidenced by (1): adrenal insufficiency with resulting electrolyte imbalance and altered mental status, r/o seizure, and need for means of alternate nutrition support.  Additional Assessment Information (1): 11/16: note order for nursing to attempt swallow evaluation however pt not appropriate to participate in eval.                                         Nutrition Interventions/Recommendations   Nutrition Prescription  Individualized Nutrition Prescription Provided for : Enteral Nutrition    Food and/or Nutrient Delivery Interventions        Enteral Intake: Insert enteral feeding tube  Goal: Dobbhoff will be placed within next 24-48 hours           PARENTERAL NUTRITION: Suggest PPN as Clinimix 4.25%/5% at initial goal of 70mL/hr for 571kcal, 71g pro, and 1680mL fluid  LABS: obtain daily renal BMP with serum magnesium; will need to replete lytes separately from PPN  bag.   ENTERAL NUTRITION: If dobbhoff placed, suggest initiate tube feeds with Vital 1.5 start at 20mL/hr and increase every 6-8 hours by 10mL until initial goal of 50mL/hr is reached to provide 1800kcal, 81g pro, and 917mL formula free water or ~90% kcal needs and 1.6g pro/kg IBW.     WATER FLUSH: Suggest EN water flush of 150mL 6x/day to provide total of 1817mL water (formula + flush.     DIET: Once awake/alert, suggest ST bedside swallow evaluation prior to PO attempts. Suggesting diet then as liberalized regular due to bouts of hypoglycemia; food/fluid textures as per ST intervention.                                                 Coordination of Nutrition Care by a Nutrition Professional  Collaboration and Referral of Nutrition Care: Collaboration by nutrition professional with other providers  Goal: AMINAH Larry; epic chat Dr. Win    Education Documentation  No documentation found.           Nutrition Monitoring and Evaluation   Food and Nutrient Related History                 Enteral and Parenteral Nutrition Intake: Enteral nutrition formula/solution, Enteral nutrition intake  Criteria: EN will be initiated within 24-48 hours and will be tolerated                        Anthropometrics: Body Composition/Growth/Weight History  Weight: Measured weight  Criteria: daily weight                   Biochemical Data, Medical Tests and Procedures  Electrolyte and Renal Panel: Magnesium, Phosphorus, Potassium, Sodium, BUN, Creatinine  Criteria: labs WNR         Glucose/Endocrine Profile: Glucose, casual, Glucose, fasting  Criteria: BG 70-180mg/dL              Nutrition Focused Physical Findings                      Skin: Impaired wound healing  Criteria: promote skin integrity              Follow Up  Time Spent (min): 60 minutes  Last Date of Nutrition Visit: 01/16/24  Nutrition Follow-Up Needed?: 3-5 days  Follow up Comment: MONICA william recs in

## 2024-01-16 NOTE — PROGRESS NOTES
Bing Holliday is a 63 y.o. female on day 1 of admission presenting with Adrenal insufficiency (CMS/HCC).      Subjective   Patient was seen and evaluated at bedside this morning. She is in mild distress and is A&O x0. Her overall condition has worsened from yesterday and is unable to answer verbally to any questions. She is shaking and twitching. She is also wincing in pain to superficial palpation of upper and lower extremities and abdomen. ROS difficult to obtain secondary to patient's mentation this morning. Her son to be updated via phone call by attending.        Objective   Physical exam    Constitutional:       General: She is in acute distress.   HENT:      Head: Normocephalic and atraumatic.      Nose: Nose normal.      Mouth/Throat:      Mouth: Mucous membranes are moist.   Eyes:      Conjunctiva/sclera: Conjunctivae normal.      Pupils: Pupils are equal, round, and reactive to light.   Cardiovascular:      Rate and Rhythm: Rhythm irregularly irregular.      Heart sounds: Normal heart sounds. No murmur heard.  Pulmonary:      Effort: Pulmonary effort is normal.      Breath sounds: Normal breath sounds.   Abdominal:      General: Abdomen is flat. Bowel sounds are normal.      Palpations: Abdomen is soft.      Tenderness: There is abdominal tenderness.   Musculoskeletal:      Right upper arm: Tenderness present.      Left upper arm: Tenderness present.      Cervical back: Normal range of motion and neck supple.      Right lower leg: Tenderness present.      Left lower leg: Tenderness present.   Skin:     General: Skin is warm and dry.   Neurological:      General: No focal deficit present.      Mental Status: She is lethargic and disoriented.      Comments: A&O: 0       Last Recorded Vitals  BP (!) 127/97 (BP Location: Right arm, Patient Position: Lying)   Pulse 69   Temp 36.1 °C (97 °F) (Temporal)   Resp 18   Wt 93.2 kg (205 lb 7.5 oz)   SpO2 98%   Intake/Output last 3 Shifts:    Intake/Output  Summary (Last 24 hours) at 1/16/2024 1319  Last data filed at 1/16/2024 0500  Gross per 24 hour   Intake --   Output 50 ml   Net -50 ml       Admission Weight  Weight: 93 kg (205 lb 0.4 oz) (01/14/24 1410)    Daily Weight  01/16/24 : 93.2 kg (205 lb 7.5 oz)    Image Results  Vascular US Lower Extremity Venous Duplex Bilateral              [facility]  [address] [place] [id number]      Tel [phone] Fax [phone]       Vascular Lab Report     [facility] US LOWER EXTREMITY VENOUS DUPLEX BILATERAL    Patient Name:      LISBET ZARAGOZA NENITA      Reading Physician:  [id number] Mena Arellano MD  Study Date:        1/15/2024             Ordering Provider:  [id number] ROMERO  MRN/PID:           [id number]              Fellow:  Accession#:        [id number]          Technologist:       Ginna Travis RVHEENA  Date of Birth/Age: 1960 / 63 years Technologist 2:  Gender:            F                     Encounter#:         [id number]  Admission Status:  Inpatient             Location Performed: [facility]       Diagnosis/ICD: Localized swelling, mass and lump, neck-R22.1  Indication:    Limb swelling  CPT Codes:     86663 Peripheral venous duplex scan for DVT complete       Pertinent History: HTN, Hyperlipidemia, CVA and Anticoagulation. Afib, DM.       CONCLUSIONS:  Right Lower Venous: No evidence of acute deep vein thrombus visualized in the right lower extremity. Cannot rule out thrombus in non-visualized peroneal vein due to swelling and edema.  Left Lower Venous: No evidence of acute deep vein thrombus visualized in the left lower extremity. Cannot rule out thrombus in non-visualized posterior tibial and peroneal veins due to swelling and edema.  Additional Findings:  Images are limited due to  patient inability to tolerate touch and compressions  to LE.       Comparison:  Compared with study from 12/10/2022, no significant change.     Imaging & Doppler Findings:     Right                 Compressible Thrombus        Flow  Distal External Iliac     Yes        None   Spontaneous/Phasic  CFV                       Yes        None   Spontaneous/Phasic  PFV                       Yes        None  FV Proximal               Yes        None   Spontaneous/Phasic  FV Mid                    Yes        None  FV Distal                 Yes        None  Popliteal                 Yes        None   Spontaneous/Phasic  PTV                       Yes        None       Left                  Compress Thrombus        Flow  Distal External Iliac   Yes      None   Spontaneous/Phasic  CFV                     Yes      None   Spontaneous/Phasic  PFV                     Yes      None  FV Proximal             Yes      None   Spontaneous/Phasic  FV Mid                  Yes      None  FV Distal               Yes      None  Popliteal               Yes      None   Spontaneous/Phasic       [id number] Mena Arellano MD  Electronically signed by [id number] Mena Arellano MD on 1/15/2024 at 8:42:18 PM       ** Final **  MR brain w and wo IV contrast  Narrative: Interpreted By:  Sanchez Sandoval,   STUDY:  MR BRAIN W AND WO IV CONTRAST;  1/15/2024 4:30 pm      INDICATION:  Signs/Symptoms:encephalopathy, c/f neuro lupus vs cva vs other acute  intracranial pathology.      COMPARISON:  March 2023.      ACCESSION NUMBER(S):  [id number]      ORDERING CLINICIAN:  JAYLA SORIANO      TECHNIQUE:  The brain was studied in the sagittal, axial and coronal planes  utilizing FLAIR, T1 and T2 weighted images.     Following intravenous  injection of gadolinium contrast, T1 weighted fat suppressed  multiplanar images were also performed.      FINDINGS:  There is a normal-size ventricular system.  There is no evidence of  intracranial mass or  extra-axial collection.  The skull base,  paranasal sinuses and orbital structures are unremarkable. Diffusion  weighted images and associated ADC maps of the brain were  unremarkable.  There is no evidence of diffusion restriction to  suggest the presence of acute infarction.      There is focal encephalomalacia in the left occipital region  unchanged from the previous exam. Gradient echo T2 weighted images  demonstrate hemosiderin deposition in the left occipital region..  Findings are consistent with remote infarction or contusion.      There are scattered foci of increased signal in the subcortical and  periventricular white matter best appreciated on FLAIR images. These  likely represent foci of demyelination of uncertain cause and  significance and may be physiologic at this patient's stated age.      Impression: *There are patchy and confluence regions of abnormal signal in the  periventricular white matter bilaterally. These are nonspecific  findings consistent with demyelination due to ischemic, degenerative  or primary demyelinating processes. *Unchanged previous infarction  left occipital pole *There is no evidence of mass, iacute infarction  or hemorrhage. * There is no measurable change compared to the  previous exam.      MACRO:  none      Signed by: Sanchez Sandoval 1/15/2024 4:48 PM  Dictation workstation:   [id number]  ECG 12 lead  Atrial fibrillation with rapid ventricular response  ST & T wave abnormality, consider inferior ischemia or digitalis effect  Abnormal ECG  When compared with ECG of 17-DEC-2023 03:26,  Atrial fibrillation has replaced Sinus rhythm  Vent. rate has increased BY  57 BPM  ST now depressed in Inferior leads  ST now depressed in Anterior leads  T wave inversion now evident in Lateral leads  QT has lengthened  ECG 12 lead  Atrial fibrillation  Moderate voltage criteria for LVH, may be normal variant  ST & T wave abnormality, consider inferolateral ischemia or digitalis  effect  Prolonged QT  Abnormal ECG  When compared with ECG of 14-JAN-2024 14:13, (unconfirmed)  ST elevation now present in Lateral leads          Relevant Results  Scheduled medications  apixaban, 2.5 mg, oral, BID  atorvastatin, 40 mg, oral, Daily  dextrose, 1,000 mL, intravenous, q6h  folic acid, 1 mg, oral, Daily  hydrocortisone sodium succinate, 75 mg, intravenous, q6h  levETIRAcetam, 500 mg, intravenous, q12h  levETIRAcetam, 500 mg, intravenous, Once  lidocaine, 5 mL, infiltration, Once  LORazepam, 1 mg, intravenous, Once  mycophenolate, 1,000 mg, oral, BID  polyethylene glycol, 17 g, oral, Daily  sodium chloride 0.9%, 10 mL, intra-catheter, q12h  sodium chloride 0.9%, 10 mL, intra-catheter, q12h      Continuous medications     PRN medications  PRN medications: acetaminophen, alteplase, dextrose 10 % in water (D10W), dextrose, glucagon, sodium chloride 0.9%, sodium chloride 0.9%     Results for orders placed or performed during the hospital encounter of 01/14/24 (from the past 24 hour(s))   Hemoglobin and hematocrit, blood   Result Value Ref Range    Hemoglobin 7.1 (L) 12.0 - 16.0 g/dL    Hematocrit 23.9 (L) 36.0 - 46.0 %   Type and screen   Result Value Ref Range    ABO TYPE O     Rh TYPE POS     ANTIBODY SCREEN NEG    POCT GLUCOSE   Result Value Ref Range    POCT Glucose 94 74 - 99 mg/dL   POCT GLUCOSE   Result Value Ref Range    POCT Glucose 95 74 - 99 mg/dL   POCT GLUCOSE   Result Value Ref Range    POCT Glucose 92 74 - 99 mg/dL   Renal function panel   Result Value Ref Range    Glucose 92 74 - 99 mg/dL    Sodium 152 (H) 136 - 145 mmol/L    Potassium 4.1 3.5 - 5.3 mmol/L    Chloride 119 (H) 98 - 107 mmol/L    Bicarbonate 23 21 - 32 mmol/L    Anion Gap 14 10 - 20 mmol/L    Urea Nitrogen 32 (H) 6 - 23 mg/dL    Creatinine 1.55 (H) 0.50 - 1.05 mg/dL    eGFR 38 (L) >60 mL/min/1.73m*2    Calcium 8.3 (L) 8.6 - 10.3 mg/dL    Phosphorus 3.1 2.5 - 4.9 mg/dL    Albumin 2.7 (L) 3.4 - 5.0 g/dL   CBC   Result Value Ref  Range    WBC 3.5 (L) 4.4 - 11.3 x10*3/uL    nRBC 1.1 (H) 0.0 - 0.0 /100 WBCs    RBC 2.33 (L) 4.00 - 5.20 x10*6/uL    Hemoglobin 7.0 (L) 12.0 - 16.0 g/dL    Hematocrit 24.3 (L) 36.0 - 46.0 %     (H) 80 - 100 fL    MCH 30.0 26.0 - 34.0 pg    MCHC 28.8 (L) 32.0 - 36.0 g/dL    RDW 20.7 (H) 11.5 - 14.5 %    Platelets 76 (L) 150 - 450 x10*3/uL   Magnesium   Result Value Ref Range    Magnesium 1.96 1.60 - 2.40 mg/dL   POCT GLUCOSE   Result Value Ref Range    POCT Glucose 61 (L) 74 - 99 mg/dL   POCT GLUCOSE   Result Value Ref Range    POCT Glucose 23 (L) 74 - 99 mg/dL   POCT GLUCOSE   Result Value Ref Range    POCT Glucose 118 (H) 74 - 99 mg/dL   Renal function panel   Result Value Ref Range    Glucose 146 (H) 74 - 99 mg/dL    Sodium 150 (H) 136 - 145 mmol/L    Potassium 4.2 3.5 - 5.3 mmol/L    Chloride 117 (H) 98 - 107 mmol/L    Bicarbonate 25 21 - 32 mmol/L    Anion Gap 12 10 - 20 mmol/L    Urea Nitrogen 31 (H) 6 - 23 mg/dL    Creatinine 1.65 (H) 0.50 - 1.05 mg/dL    eGFR 35 (L) >60 mL/min/1.73m*2    Calcium 8.1 (L) 8.6 - 10.3 mg/dL    Phosphorus 2.7 2.5 - 4.9 mg/dL    Albumin 2.7 (L) 3.4 - 5.0 g/dL          Assessment/Plan   This patient currently has cardiac telemetry ordered; if you would like to modify or discontinue the telemetry order, click here to go to the orders activity to modify/discontinue the order.    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 Holliday is a 63yr old female with a pmx of CAD sp CABG 2013, HTN, T2DM, CKD stage 3, Afib on Eliquis, HLD, AAA, intracranial hemorrhage, sensorineural hearing loss, and SLE on chronic steroid use. She lives with her son at home and presents to the ED with increased weakness and confusion as noted by her family. Of note, patient was discharged from a SNF 4-5 days ago for altered mental status and abx treatment of UTI. The workup in ED revealed hypotension, hypoglycemia, elevated lactate, and hypomagnesemia. Patient was admitted to the hospital for workup of her weakness and altered mental status and treatment of her electrolytes. Patient's chronic steroid use and recent confusion (med compliance) could have lead to adrenal inefficiency given the hypoglycemia and metabolic disturbances. On day 2 of admission MRI was obtained due to persistent altered mental status. MRI brain w/ and w/out contrast showed focal encephalomalacia in left occipital region unchanged from previous exam. Hemosiderin deposit in left occipital region consistent with remote infarction/contusion. Scattered Foci with increased signal in subcortical and periventricular white matter consistent with demyelination (new finding, not noted on prior MRI). On day 3 of admission patient became less oriented and was shaking at baseline, which looked consistent with tetany despite calcium and other electrolytes wnl. Neurology was consulted and continues EEG resulted status epilepticus. Patient was treated with Keppra and Ativan. Neurology recommended transfer for ICU level care with ultimate recommendations for transfer to neuro ICU for continuous vEEG and real-time titration of medications/therapy.     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  -AI likely secondary to acute infection (considering C-diff) vs other metabolic process  -MRI brain w/ and w/out contrast showed focal encephalomalacia in left occipital region unchanged from previous exam. Hemosiderin deposit in left occipital region consistent with remote infarction/contusion. Scattered Foci with increased signal in subcortical and periventricular white matter consistent with demyelination-new finding, not noted previously  -CRP (2.52), ESR (34), CK (67)  -CT of the head unremarkable for any acute process  -CT abdomen and pelvis unremarkable for any acute process    Plan:  -Neurology consulted, appreciate recomendations  -Continuous video eeg  -Status post keppra load and ativan   -BID keppra per neurology  -Status post ativan given status epilepticus  -Neuro checks Q2 hours and seizure precautions   -Neuro feels patient needs ICU level care and transfer downtown for more expedited titration of medications and also for rheumatology evaluation given possible new rheum process leading to seizure and new MRI findings  -Continue pulse dose steroids 75 mg Solu-Cortef every 6 hours  -Remains NPO given altered mental status  -Given persistent hypoglycemia and npo, ppn via nutrition consult  -Will trend electrolytes  -Given frequency of bathroom visits at home and inpatient diarrhea, obtain stool pathogen PCR and C. difficile PCR  -Continue CellCept, folic acid  -palliative consulted, appreciate recommendations    #Acute hypernatremia  #altered mental status  -Sodium 152 today, yesterday 147  -Free water deficit calculated at 4L  -Status post 1L d5W with repeat 1L d5W in 6 hours    Plan:  -Q4 hours RFP  -After 2nd L D5W reevaluate labs to further resuscitate the free water deficit    # Acute  macrocytic, hypochromic anemia  # Anemia of chronic disease  -Current hemoglobin 7.0, admission hgb 8.2  -Baseline hemoglobin 7-8  -No S/S active bleed    Plan:  -H&H pending 3pm 1/16  -Transfuse hemoglobin less than 7     #Afib  #C/F DVT, ruled out  -EKG in ED showed Afib and was rate controled (home med is amlodipine)  -Bilateral duplex showed no evidence of acute DVT  -Pain to palpation in both lower extremities with b/l LE edema  -Patient has episode of epistaxis and was taken off Eliquis for 7 days but has since been restarted.     Plan:  -Holding Eliquis 2.5 BID given NPO status   -Start 40mg subQ Lovenox for therapeutic dosing for a-fib  -tele monitoring   -hold amlodipine     #JED (likely pre-renal) in the setting of dehydration + GI loss 2/2 diarrhea  #CKD 3  -Patients son reported she has been consuming less because she can't taste anything.  -Baseline creatinine 1.8-1.9  -24-hour UO since admission 500 cc, net -100 cc  -Admission creatinine 2.24, now 1.65  -Likely in the setting of dehydration +/- GI loss from suspected diarrhea  -S/p IVF bolus in the ED  -UA without evidence of infection     Plan:  -Q4 hours RFP    #Chronic sacral wound  -Patients wound is pink, clean, intact and without signs of infection    Plan:  -wound care consulted  -encourage offloading weight     #T2DM  #HTN  #COPD  #GERD  -continue home mediations      Diet: NPO  Code status: DNR/DNI  DVT prophlaxis: Lovenox 40mg subq and SCDs  Consults: Neuro, palliative, nutrition, PT/OT, wound care  Disposition: 3-4 days for further evaluation and workup of patients altered mental status, glucose, and blood pressure.            MAINE Win, DO  PGY-2 Internal Medicine

## 2024-01-16 NOTE — DOCUMENTATION CLARIFICATION NOTE
"    PATIENT:               LISBET GUTIERRES  ACCT #:                  [id number]  MRN:                       [id number]  :                       1960  ADMIT DATE:       2024 1:58 PM  DISCH DATE:  RESPONDING PROVIDER #:        [id number]          PROVIDER RESPONSE TEXT:    Pancytopenia due to chronic disease    CDI QUERY TEXT:    [facility]_Pancytopenia Type    Instruction:    Based on your assessment of the patient and the clinical information, please provide the requested documentation by clicking on the appropriate radio button and enter any additional information if prompted.    Question: Can the diagnosis of pancytopenia be further clarified    When answering this query, please exercise your independent professional judgment. The fact that a question is being asked, does not imply that any particular answer is desired or expected.    The patient's clinical indicators include:  Clinical Information: 62 y/o F admitted with Adrenal insufficiency    Clinical Indicators:  labs -1/15:  WBC: 4.9, 3.8, 3.5  HGB: 8.2, 7.0, 7.1, 7.0  RBC: 2.74, 2.35, 2.33  platelet: 101, 69, 76    ED note  Dr. Tyler: \"presents emergency department for generalized weakness.  She also seemed confused today, not making any sense when she spoke with him.  She complained of lower back pain yesterday\"    H/P  Dr. 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  Anemia, Pancytopenia.  she has a hx of pancytopenia so she us not able to mount a full immune response\"    MD PN 1/15 Dr. Dee: \"Secondary AI in an immunocompromised host with h/o chronic steroid use and immunosuppressive therapy in the setting of SLE, C/F neuropsychiatric SLE, JED (likely pre-renal) in the setting of dehydration + GI loss 2/2 diarrhea.  Acute macrocytic, hypochromic anemia, anemia of chronic disease\"    Treatment: daily labs, trend CBC, type and screen, Transfuse hemoglobin less than 7    Risk Factors: " adrenal insufficiency, SLE, immunocompromised host, pancytopenia, PO cellcept BID  Options provided:  -- Pancytopenia due to Cellcept  -- Pancytopenia due to chronic disease  -- Pancytopenia due to other ## please specify, please specify  -- Other - I will add my own diagnosis  -- Refer to Clinical Documentation Reviewer    Query created by: Vandana Mitchell on 1/16/2024 9:49 AM      Electronically signed by:  RAMYA FRANCISCO MD 1/16/2024 1:03 PM

## 2024-01-16 NOTE — CONSULTS
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 daily, diabetes mellitus type 2 who had a previously been admitted to the hospital for further evaluation of hypoglycemia associated altered mental status last was in December 2023 and was discharged on December 21, 2023 to nursing facility.    The patient was noticed lately to have progressive weakness, confusion, diarrhea for which she was admitted to the hospital for further evaluation she was noticed to have associated hypoglycemia for which she received D50 intravenous treatment, hypernatremia, sodium 152, ( has a history of Norovirus GI detected 11/2-23) , and later on was noticed to have paroxysmal spells of facial twitching, lip smacking, altered level of awareness, staring spells, speech arrest, jerking movements bilateral upper extremities with all of which were highly concerning for seizure activity for which she was started on Ativan and was loaded with Keppra 1000 mg IV (dose was adjusted due to her poor kidney function), later on additional dose of Keppra 500 mg was given due to continuous seizure activity after discussing with the pharmacist and she received Ativan as needed as patient's condition relatively improved but she continued to have difficulty speaking, following commands and intermittent jerking movement of both shoulders.    Her case was discussed with the Hospitalist Dr. Dee, and the intensive care physician Dr. Edenilson saunders we raised the need for the patient to have continuous video EEG monitoring, spinal tap, multi disciplinary evaluation  including rheumatological , infectious disease and endocrinology.  In addition to start her on empiric treatment of antibiotics, antiviral pending results of spinal tap provided history of immunosuppression on CellCept with the need to rule out meningitis/encephalitis.    The effort to getting her transferred to [facility] ICU is ongoing.  She had MRI of the brain performed with and without contrast which was consistent with nonspecific CNS demyelinating disease.      Past Medical History  Past Medical History:   Diagnosis Date    Acute upper respiratory infection, unspecified 03/04/2020    Acute URI    Acute upper respiratory infection, unspecified 09/30/2015    URTI (acute upper respiratory infection)    Arthritis     Body mass index (BMI) 23.0-23.9, adult 10/15/2021    BMI 23.0-23.9, adult    Body mass index (BMI) 33.0-33.9, adult 03/04/2020    BMI 33.0-33.9,adult    Cardiomegaly 08/27/2013    Left ventricular hypertrophy    Chronic kidney disease, stage 3 unspecified (CMS/HCC) 07/02/2013    Chronic kidney disease, stage III (moderate)    Disease of pericardium, unspecified 07/02/2013    Pericardial disease    Encounter for follow-up examination after completed treatment for conditions other than malignant neoplasm 10/06/2022    Hospital discharge follow-up    Generalized contraction of visual field, right eye 01/29/2015    Generalized contraction of visual field of right eye    Homonymous bilateral field defects, right side 04/29/2016    Homonymous bilateral field defects of right side    Hypertensive chronic kidney disease with stage 1 through stage 4 chronic kidney disease, or unspecified chronic kidney disease 07/02/2013    Nephrosclerosis    Laceration without foreign body, left foot, initial encounter 07/03/2018    Foot laceration, left, initial encounter    Migraine with aura, not intractable, without status migrainosus 10/24/2022    Ocular migraine    Other conditions influencing health status 07/02/2013     Chronic Glomerulonephritis In Diseases Classified Elsewhere    Other conditions influencing health status 07/02/2013    Progressive Familial Myoclonic Epilepsy    Other conditions influencing health status 07/02/2013    Protein S Deficiency    Other conditions influencing health status 05/22/2015    Familial Combined Hyperlipidemia    Other conditions influencing health status 10/24/2022    IDDM (insulin dependent diabetes mellitus)    Other conditions influencing health status 03/14/2022    Diabetes mellitus, insulin dependent (IDDM), uncontrolled    Other long term (current) drug therapy 10/24/2022    Long-term use of Plaquenil    Personal history of diseases of the blood and blood-forming organs and certain disorders involving the immune mechanism 07/02/2013    History of thrombocytopenia    Personal history of diseases of the skin and subcutaneous tissue 08/11/2015    History of foot ulcer    Personal history of nephrotic syndrome 07/02/2013    History of nephrotic syndrome    Personal history of other diseases of the circulatory system 08/27/2013    History of sinus tachycardia    Personal history of other diseases of the nervous system and sense organs     History of cataract    Personal history of other diseases of the respiratory system     History of bronchitis    Personal history of other infectious and parasitic diseases 07/02/2013    History of hepatitis    Personal history of other specified conditions     History of shortness of breath    Personal history of other specified conditions 08/27/2013    History of edema    Puckering of macula, right eye 10/24/2022    ERM OD (epiretinal membrane, right eye)    Raynaud's syndrome without gangrene 07/02/2013    Raynaud's disease    Systemic lupus erythematosus, unspecified (CMS/[facility]) 07/24/2015    SLE (systemic lupus erythematosus)    Systemic lupus erythematosus, unspecified (CMS/[facility]) 07/24/2015    SLE (systemic lupus erythematosus)    Systemic lupus  erythematosus, unspecified (CMS/HCC) 07/24/2015    Systemic lupus    Type 2 diabetes mellitus with diabetic nephropathy (CMS/HCC) 07/02/2013    Type 2 diabetes with nephropathy    Type 2 diabetes mellitus with mild nonproliferative diabetic retinopathy without macular edema, left eye (CMS/HCC) 07/27/2015    Non-proliferative diabetic retinopathy, left eye    Type 2 diabetes mellitus with mild nonproliferative diabetic retinopathy without macular edema, unspecified eye (CMS/HCC) 07/24/2015    Mild non proliferative diabetic retinopathy    Type 2 diabetes mellitus with proliferative diabetic retinopathy without macular edema, right eye (CMS/HCC) 07/27/2015    Proliferative diabetic retinopathy of right eye    Type 2 diabetes mellitus with proliferative diabetic retinopathy without macular edema, unspecified eye (CMS/HCC) 07/24/2015    Diabetic proliferative retinopathy    Unspecified acute and subacute iridocyclitis 07/24/2015    Acute iritis, right eye    Unspecified open wound, left foot, sequela 07/03/2018    Wound, open, foot, left, sequela     Surgical History  Past Surgical History:   Procedure Laterality Date    ANKLE SURGERY  01/29/2015    Ankle Surgery    CHOLECYSTECTOMY  01/29/2015    Cholecystectomy    CT GUIDED PERCUTANEOUS BIOPSY BONE DEEP  5/4/2021    CT GUIDED PERCUTANEOUS BIOPSY BONE DEEP 5/4/2021 [facility] CLINICAL LEGACY    EYE SURGERY  03/06/2015    Eye Surgery    FOOT SURGERY  01/29/2015    Foot Surgery    MR HEAD ANGIO WO IV CONTRAST  7/26/2013    MR HEAD ANGIO WO IV CONTRAST 7/26/2013 [facility] CLINICAL LEGACY    MR HEAD ANGIO WO IV CONTRAST  9/17/2021    MR HEAD ANGIO WO IV CONTRAST 9/17/2021 [facility]    MR HEAD ANGIO WO IV CONTRAST  3/25/2023    MR HEAD ANGIO WO IV CONTRAST [facility]    MR NECK ANGIO WO IV CONTRAST  7/26/2013    MR NECK ANGIO WO IV CONTRAST 7/26/2013 [facility] CLINICAL LEGACY    MR NECK ANGIO WO IV CONTRAST  9/17/2021    MR NECK ANGIO WO IV CONTRAST 9/17/2021 [facility]     MR NECK ANGIO WO IV CONTRAST  3/25/2023    MR NECK ANGIO WO IV CONTRAST [facility]    OTHER SURGICAL HISTORY  01/29/2015    Creation Of Pericardial Window    OTHER SURGICAL HISTORY  01/29/2015    Quadricepsplasty    TOTAL HIP ARTHROPLASTY  01/29/2015    Hip Replacement     Social History  Social History     Tobacco Use    Smoking status: Never    Smokeless tobacco: Never   Vaping Use    Vaping Use: Never used   Substance Use Topics    Alcohol use: Never    Drug use: Never     Allergies  Ace inhibitors, Hydroxychloroquine, Lisinopril, Penicillins, and Sulfa (sulfonamide antibiotics)  Medications Prior to Admission   Medication Sig Dispense Refill Last Dose    acetaminophen (Tylenol) 325 mg tablet Take 2 tablets (650 mg) by mouth every 4 hours if needed for mild pain (1 - 3).   1/14/2024    amLODIPine (Norvasc) 2.5 mg tablet Take 1 tablet (2.5 mg) by mouth once daily.   1/14/2024    atorvastatin (Lipitor) 40 mg tablet Take 1 tablet (40 mg) by mouth once daily. 90 tablet 3 1/14/2024    belimumab (Benlysta) 400 mg recon soln IV injection Infuse 10 mg/kg into a venous catheter every 28 (twenty-eight) days.  (900mg per dose)   Past Month    blood-glucose sensor (Dexcom G6 Sensor) device Use to check sugars 3 times daily 4 each 2     Ca carb-D3-mag qa-zzx-druu-Zn 300 mg-20 mcg- 25 mg-0.5 mg tablet Take 1 tablet by mouth 2 times a day.   1/14/2024    Dexcom G4 platinum  (Dexcom G6 ) misc Use as instructed 1 each 0     Dexcom G4 platinum transmitter (Dexcom G6 Transmitter) device Use as instructed 1 each 0     fluticasone-umeclidin-vilanter (TRELEGY-ELLIPTA) 200-62.5-25 mcg blister with device Inhale 1 puff once daily. 60 each 5 1/14/2024    folic acid (Folvite) 1 mg tablet TAKE 1 TABLET BY MOUTH EVERY DAY 90 tablet 1 1/14/2024    magnesium oxide (Mag-Ox) 400 mg tablet Take 1 tablet (400 mg) by mouth once daily.   1/14/2024    melatonin 5 mg tablet Take 1 tablet (5 mg) by mouth once daily at bedtime.        "multivitamin tablet Take 1 tablet by mouth once daily.   1/14/2024    mycophenolate (Cellcept) 500 mg tablet Take 2 tablets (1,000 mg) by mouth twice a day.   1/14/2024    pantoprazole (ProtoNix) 40 mg EC tablet TAKE 1 TABLET BY MOUTH EVERY DAY 90 tablet 1 1/14/2024    predniSONE (Deltasone) 10 mg tablet Take 1 tablet (10 mg) by mouth once daily in the morning. Take first thing when you wake up every morning. 30 tablet 2 1/14/2024    predniSONE (Deltasone) 5 mg tablet Take 1 tablet (5 mg) by mouth see administration instructions. Take one tablet everyday at 2PM scheduled 30 tablet 2 1/14/2024    risperiDONE (RisperDAL) 0.5 mg tablet TAKE 1 TABLET BY MOUTH AT BEDTIME (Patient not taking: Reported on 1/15/2024) 30 tablet 0 Not Taking    torsemide (Demadex) 10 mg tablet TAKE 1 TABLET BY MOUTH EVERY DAY (Patient not taking: Reported on 1/15/2024) 90 tablet 0 Not Taking       Review of Systems  Neurological Exam  Physical Exam  Last Recorded Vitals  Blood pressure 132/62, pulse 78, temperature 36.2 °C (97.2 °F), temperature source Temporal, resp. rate 24, height 1.727 m (5' 7.99\"), weight 93.2 kg (205 lb 7.5 oz), SpO2 96 %.    Relevant Results        NIH Stroke Scale  1A. Level of Consciousness: Arouses to Minor Stimulation  1B. Ask Month and Age: No Questions Right  1C. Blink Eyes & Squeeze Hands: Performs 1 Task  2. Best Gaze: Normal  3. Visual: No Visual Loss  4. Facial Palsy: Normal Symmetrical Movements  5A. Motor - Left Arm: Some Effort Against Gravity  5B. Motor - Right Arm: Some Effort Against Gravity  6A. Motor - Left Leg: Some Effort Against Gravity  6B. Motor - Right Leg: Some Effort Against Gravity  7. Limb Ataxia: Absent  8. Sensory Loss: Normal  9. Best Language: Severe Aphasia  10. Dysarthria: Mild-to-Moderate Dysarthria  11. Extinction and Inattention: No Abnormality  NIH Stroke Scale: 15           Nasim Coma Scale  Best Eye Response: To verbal stimuli  Best Verbal Response: Incomprehensible " sounds  Best Motor Response: Follows commands  Nasim Coma Scale Score: 11                 I have personally reviewed the following imaging results ECG 12 lead    Result Date: 1/16/2024  Atrial fibrillation Moderate voltage criteria for LVH, may be normal variant ST & T wave abnormality, consider inferolateral ischemia or digitalis effect Prolonged QT Abnormal ECG When compared with ECG of 14-JAN-2024 14:13, (unconfirmed) ST elevation now present in Lateral leads Confirmed by Mario Alberto Hopson ([id number]) on 1/16/2024 3:40:59 PM    ECG 12 lead    Result Date: 1/16/2024  Atrial fibrillation with rapid ventricular response ST & T wave abnormality, consider inferior ischemia or digitalis effect Abnormal ECG When compared with ECG of 17-DEC-2023 03:26, Atrial fibrillation has replaced Sinus rhythm Vent. rate has increased BY  57 BPM ST now depressed in Inferior leads ST now depressed in Anterior leads T wave inversion now evident in Lateral leads QT has lengthened Confirmed by Mario Alberto Hopson ([id number]) on 1/16/2024 3:39:25 PM    Vascular US Lower Extremity Venous Duplex Bilateral    Result Date: 1/15/2024            [facility] [place] [address] [place] [address]     Tel [phone] Fax [phone]  Vascular Lab Report  VASC US LOWER EXTREMITY VENOUS DUPLEX BILATERAL Patient Name:      LISBET MILA Longoria Physician:  [id number] Mena Arellano MD Study Date:        1/15/2024             Ordering Provider:  [id number] HERIBERTO ROMERO MRN/PID:           [id number]              Fellow: Accession#:        [id number]          Technologist:       Ginna Travis RVT Date of Birth/Age: 1960 / 63 years Technologist 2: Gender:            F                     Encounter#:         [id number] Admission Status:  Inpatient             Location Performed: [place]                                                               [facility]  Diagnosis/ICD: Localized swelling, mass and lump, neck-R22.1 Indication:    Limb swelling CPT Codes:     72553 Peripheral venous duplex scan for DVT complete  Pertinent History: HTN, Hyperlipidemia, CVA and Anticoagulation. Afib, DM.  CONCLUSIONS: Right Lower Venous: No evidence of acute deep vein thrombus visualized in the right lower extremity. Cannot rule out thrombus in non-visualized peroneal vein due to swelling and edema. Left Lower Venous: No evidence of acute deep vein thrombus visualized in the left lower extremity. Cannot rule out thrombus in non-visualized posterior tibial and peroneal veins due to swelling and edema. Additional Findings: Images are limited due to patient inability to tolerate touch and compressions to LE.  Comparison: Compared with study from 12/10/2022, no significant change.  Imaging & Doppler Findings:  Right                 Compressible Thrombus        Flow Distal External Iliac     Yes        None   Spontaneous/Phasic CFV                       Yes        None   Spontaneous/Phasic PFV                       Yes        None FV Proximal               Yes        None   Spontaneous/Phasic FV Mid                    Yes        None FV Distal                 Yes        None Popliteal                 Yes        None   Spontaneous/Phasic PTV                       Yes        None  Left                  Compress Thrombus        Flow Distal External Iliac   Yes      None   Spontaneous/Phasic CFV                     Yes      None   Spontaneous/Phasic PFV                     Yes      None FV Proximal             Yes      None   Spontaneous/Phasic FV Mid                  Yes      None FV Distal               Yes      None Popliteal               Yes      None   Spontaneous/Phasic  [id number] Mena Arellano MD Electronically signed by [id number] Mena Arellano MD on 1/15/2024 at 8:42:18 PM  ** Final **     MR brain w and wo IV contrast    Result Date:  1/15/2024  Interpreted By:  Sanchez Sandoval, STUDY: MR BRAIN W AND WO IV CONTRAST;  1/15/2024 4:30 pm   INDICATION: Signs/Symptoms:encephalopathy, c/f neuro lupus vs cva vs other acute intracranial pathology.   COMPARISON: March 2023.   ACCESSION NUMBER(S): [id number]   ORDERING CLINICIAN: JAYLA SORIANO   TECHNIQUE: The brain was studied in the sagittal, axial and coronal planes utilizing FLAIR, T1 and T2 weighted images.     Following intravenous injection of gadolinium contrast, T1 weighted fat suppressed multiplanar images were also performed.   FINDINGS: There is a normal-size ventricular system.  There is no evidence of intracranial mass or extra-axial collection.  The skull base, paranasal sinuses and orbital structures are unremarkable. Diffusion weighted images and associated ADC maps of the brain were unremarkable.  There is no evidence of diffusion restriction to suggest the presence of acute infarction.   There is focal encephalomalacia in the left occipital region unchanged from the previous exam. Gradient echo T2 weighted images demonstrate hemosiderin deposition in the left occipital region.. Findings are consistent with remote infarction or contusion.   There are scattered foci of increased signal in the subcortical and periventricular white matter best appreciated on FLAIR images. These likely represent foci of demyelination of uncertain cause and significance and may be physiologic at this patient's stated age.       *There are patchy and confluence regions of abnormal signal in the periventricular white matter bilaterally. These are nonspecific findings consistent with demyelination due to ischemic, degenerative or primary demyelinating processes. *Unchanged previous infarction left occipital pole *There is no evidence of mass, iacute infarction or hemorrhage. * There is no measurable change compared to the previous exam.   MACRO: none   Signed by: Sanchez Sandoval 1/15/2024 4:48 PM Dictation  workstation:   [id number]    CT chest abdomen pelvis wo IV contrast    Result Date: 1/14/2024  STUDY: CT Chest, Abdomen, and Pelvis without IV Contrast; 01/14/2024 at 5:45 PM INDICATION: Sepsis. Generalized abdominal pain. JED on CKD. COMPARISON: CT abdomen and pelvis 11/20/23, 08/28/23. CT CAP 03/21/23. XR chest 01/14/24, 12/17/23, 11/20/23. ACCESSION NUMBER(S): [id number] ORDERING CLINICIAN: Jaziel Tyler TECHNIQUE: CT of the chest, abdomen, and pelvis was performed.  Contiguous axial images were obtained at 3 mm slice thickness through the chest, abdomen, and pelvis.  Coronal and sagittal reconstructions at 3 mm slice thickness were performed.  No intravenous contrast was administered.  FINDINGS: Please note that the evaluation of vessels, lymph nodes and organs is limited without intravenous contrast. CHEST: MEDIASTINUM: Heart is enlarged.  Atherosclerosis of the thoracic aorta and coronary arteries is present.  The heart is without pericardial effusion. LUNGS/PLEURA: There is no pleural effusion, pleural thickening, or pneumothorax. The airways are patent. Lungs are clear without consolidation, interstitial disease, or suspicious nodules.  Atelectatic changes are present bilaterally. Patient motion limits assessment. LYMPH NODES: Thoracic lymph nodes are not enlarged. ABDOMEN:  LIVER: No hepatomegaly.  Smooth surface contour.  Normal attenuation.  BILE DUCTS: No intrahepatic or extrahepatic biliary ductal dilatation.  GALLBLADDER: The gallbladder is absent or severely contracted.  STOMACH: No abnormalities identified.  PANCREAS: No masses or ductal dilatation.  SPLEEN: No splenomegaly or focal splenic lesion.  ADRENAL GLANDS: No thickening or nodules.  KIDNEYS AND URETERS: Kidneys are normal in size and location.  Punctate nonobstructing RIGHT renal stone noted.  PELVIS:  BLADDER: No abnormalities identified.  REPRODUCTIVE ORGANS: No abnormalities identified.  BOWEL: Colonic diverticulosis is present without  evidence of acute diverticulitis.  VESSELS: No abnormalities identified.  Abdominal aorta is normal in caliber.  PERITONEUM/RETROPERITONEUM/LYMPH NODES: No free fluid.  No pneumoperitoneum. No lymphadenopathy.  ABDOMINAL WALL: No abnormalities identified. SOFT TISSUES: No abnormalities identified.  BONES: No acute fracture or aggressive osseous lesion.  Degenerative changes noted about the shoulders.  RIGHT hip arthroplasty is present. Artifact from the prosthesis limits assessment of the pelvis. Prosthesis appears well seated.  Severe degenerative change of LEFT hip is present.  Multilevel degenerative changes are seen throughout the spine.  Mild scoliosis of the thoracolumbar spine is present. Multilevel degenerative changes noted throughout the lumbar spine with severe disc space narrowing and degenerative endplate change at L2-L5.    1. Cardiomegaly with coronary artery disease. 2. Colonic diverticulosis is present without evidence of acute diverticulitis. 3. Nonobstructing right-sided nephrolithiasis. Signed by Claudio Mehta II, MD    CT head wo IV contrast    Result Date: 1/14/2024  Interpreted By:  Eugene Larsen, STUDY: CT of the brain without contrast dated  1/14/2024.   INDICATION: Signs/Symptoms:Confusion   COMPARISON: CT of the brain dated 12/17/2020   ACCESSION NUMBER(S): [id number]   ORDERING CLINICIAN: KEVIN SHAH   TECHNIQUE: Axial non-contrast CT of the brain was performed. Sagittal and coronal 2D reformats were obtained.   FINDINGS: BRAIN PARENCHYMA:   No acute intracranial hemorrhage or infarction is evident.  There is a focus left occipital encephalomalacia. Is similar to the prior exam. There is incidental note of basal ganglia calcifications.   VENTRICLES AND EXTRA-AXIAL SPACES:   No hydrocephalus or midline shift is evident.  There is mild focal ex vacuo dilation of the posterior horn of the left lateral ventricle related to the occipital encephalomalacia.   INTRACRANIAL VESSELS:   Calcified  atheromatous disease is seen of the visualized carotid and vertebrobasilar arterial trees.   SINUSES AND MASTOIDS:   Mucosal thickening is seen in the maxillary sinuses and multiple ethmoid air cells with complete opacification of some posterior right ethmoid air cells. There is an air-fluid level in the right sphenoid sinus. Mucoperiosteal thickening is seen in the maxillary sinuses right greater than left compatible with sequelae of chronic sinus disease. Mastoid air cells are clear.   OSSEOUS STRUCTURES:   No osseous injury is evident.   SOFT TISSUES:   Visualized associated soft tissues are grossly unremarkable.       1. No acute intracranial hemorrhage or infarction is evident. If there remains concern for acute cerebral insult, MRI is recommended. 2. Mucosal thickening in the sinuses with air-fluid level in the right sphenoid sinus as seen with acute sinusitis.   Signed by: Eugene Larsen 1/14/2024 4:34 PM Dictation workstation:   [id number]    XR chest 1 view    Result Date: 1/14/2024  STUDY: Chest Radiograph;  01/14/2024 at 2:39 PM INDICATION: Sepsis. Generalized weakness. COMPARISON: XR chest 11/20/23, 10/19/23, 08/27/23. ACCESSION NUMBER(S): [id number] ORDERING CLINICIAN: Jaziel Tyler TECHNIQUE:  Frontal chest was obtained at 1439 hours. FINDINGS: CARDIOMEDIASTINAL SILHOUETTE: Cardiomediastinal silhouette is top limits of normal for technique.  LUNGS: Lungs are clear.  ABDOMEN: No remarkable upper abdominal findings.  BONES: No acute osseous changes.    No evidence consolidating infiltrate or effusion. Signed by Can Lambert MD    Results for orders placed or performed during the hospital encounter of 01/14/24 (from the past 96 hour(s))   ECG 12 lead   Result Value Ref Range    Ventricular Rate 105 BPM    Atrial Rate 208 BPM    QRS Duration 100 ms    QT Interval 348 ms    QTC Calculation(Bazett) 459 ms    R Axis 16 degrees    T Axis 224 degrees    QRS Count 17 beats    Q Onset 215 ms    T Offset 389 ms     QTC Fredericia 419 ms   CBC and Auto Differential   Result Value Ref Range    WBC 4.9 4.4 - 11.3 x10*3/uL    nRBC 1.0 (H) 0.0 - 0.0 /100 WBCs    RBC 2.74 (L) 4.00 - 5.20 x10*6/uL    Hemoglobin 8.2 (L) 12.0 - 16.0 g/dL    Hematocrit 27.7 (L) 36.0 - 46.0 %     (H) 80 - 100 fL    MCH 29.9 26.0 - 34.0 pg    MCHC 29.6 (L) 32.0 - 36.0 g/dL    RDW 20.0 (H) 11.5 - 14.5 %    Platelets 101 (L) 150 - 450 x10*3/uL    Neutrophils % 82.6 40.0 - 80.0 %    Immature Granulocytes %, Automated 0.4 0.0 - 0.9 %    Lymphocytes % 13.6 13.0 - 44.0 %    Monocytes % 3.0 2.0 - 10.0 %    Eosinophils % 0.2 0.0 - 6.0 %    Basophils % 0.2 0.0 - 2.0 %    Neutrophils Absolute 4.08 1.20 - 7.70 x10*3/uL    Immature Granulocytes Absolute, Automated 0.02 0.00 - 0.70 x10*3/uL    Lymphocytes Absolute 0.67 (L) 1.20 - 4.80 x10*3/uL    Monocytes Absolute 0.15 0.10 - 1.00 x10*3/uL    Eosinophils Absolute 0.01 0.00 - 0.70 x10*3/uL    Basophils Absolute 0.01 0.00 - 0.10 x10*3/uL   Comprehensive Metabolic Panel   Result Value Ref Range    Glucose 66 (L) 74 - 99 mg/dL    Sodium 148 (H) 136 - 145 mmol/L    Potassium 4.0 3.5 - 5.3 mmol/L    Chloride 110 (H) 98 - 107 mmol/L    Bicarbonate 27 21 - 32 mmol/L    Anion Gap 15 10 - 20 mmol/L    Urea Nitrogen 51 (H) 6 - 23 mg/dL    Creatinine 2.24 (H) 0.50 - 1.05 mg/dL    eGFR 24 (L) >60 mL/min/1.73m*2    Calcium 8.1 (L) 8.6 - 10.3 mg/dL    Albumin 3.2 (L) 3.4 - 5.0 g/dL    Alkaline Phosphatase 96 33 - 136 U/L    Total Protein 5.2 (L) 6.4 - 8.2 g/dL    AST 33 9 - 39 U/L    Bilirubin, Total 0.2 0.0 - 1.2 mg/dL    ALT 38 7 - 45 U/L   Magnesium   Result Value Ref Range    Magnesium 1.50 (L) 1.60 - 2.40 mg/dL   Troponin I, High Sensitivity, Initial   Result Value Ref Range    Troponin I, High Sensitivity 27 (H) 0 - 13 ng/L   Lactate   Result Value Ref Range    Lactate 3.4 (H) 0.4 - 2.0 mmol/L   Blood Culture    Specimen: Peripheral Venipuncture; Blood culture   Result Value Ref Range    Blood Culture No growth at 1  day    Light Blue Top   Result Value Ref Range    Extra Tube Hold for add-ons.    Lavender Top   Result Value Ref Range    Extra Tube Hold for add-ons.    Sars-CoV-2 and Influenza A/B PCR   Result Value Ref Range    Flu A Result Not Detected Not Detected    Flu B Result Not Detected Not Detected    Coronavirus 2019, PCR Not Detected Not Detected   Blood Culture    Specimen: Peripheral Venipuncture; Blood culture   Result Value Ref Range    Blood Culture Loaded on Instrument - Culture in progress    POCT GLUCOSE   Result Value Ref Range    POCT Glucose 54 (L) 74 - 99 mg/dL   Urinalysis with Reflex Culture and Microscopic   Result Value Ref Range    Color, Urine Yellow Straw, Yellow    Appearance, Urine Hazy (N) Clear    Specific Gravity, Urine 1.015 1.005 - 1.035    pH, Urine 5.0 5.0, 5.5, 6.0, 6.5, 7.0, 7.5, 8.0    Protein, Urine 100 (2+) (N) NEGATIVE mg/dL    Glucose, Urine NEGATIVE NEGATIVE mg/dL    Blood, Urine NEGATIVE NEGATIVE    Ketones, Urine NEGATIVE NEGATIVE mg/dL    Bilirubin, Urine NEGATIVE NEGATIVE    Urobilinogen, Urine <2.0 <2.0 mg/dL    Nitrite, Urine NEGATIVE NEGATIVE    Leukocyte Esterase, Urine NEGATIVE NEGATIVE   Extra Urine Gray Tube   Result Value Ref Range    Extra Tube Hold for add-ons.    Lactate   Result Value Ref Range    Lactate 2.1 (H) 0.4 - 2.0 mmol/L   Urinalysis Microscopic   Result Value Ref Range    WBC, Urine NONE 1-5, NONE /HPF    RBC, Urine NONE NONE, 1-2, 3-5 /HPF   Troponin, High Sensitivity, 1 Hour   Result Value Ref Range    Troponin I, High Sensitivity 32 (H) 0 - 13 ng/L   ECG 12 lead   Result Value Ref Range    Ventricular Rate 86 BPM    Atrial Rate 117 BPM    QRS Duration 94 ms    QT Interval 400 ms    QTC Calculation(Bazett) 478 ms    R Axis 3 degrees    T Axis -65 degrees    QRS Count 14 beats    Q Onset 216 ms    T Offset 416 ms    QTC Fredericia 451 ms   POCT GLUCOSE   Result Value Ref Range    POCT Glucose 160 (H) 74 - 99 mg/dL   POCT GLUCOSE   Result Value Ref Range     POCT Glucose 81 74 - 99 mg/dL   Lactate   Result Value Ref Range    Lactate 0.7 0.4 - 2.0 mmol/L   POCT GLUCOSE   Result Value Ref Range    POCT Glucose 91 74 - 99 mg/dL   POCT GLUCOSE   Result Value Ref Range    POCT Glucose 150 (H) 74 - 99 mg/dL   Lavender Top   Result Value Ref Range    Extra Tube Hold for add-ons.    CBC and Auto Differential   Result Value Ref Range    WBC 3.8 (L) 4.4 - 11.3 x10*3/uL    nRBC 0.8 (H) 0.0 - 0.0 /100 WBCs    RBC 2.35 (L) 4.00 - 5.20 x10*6/uL    Hemoglobin 7.0 (L) 12.0 - 16.0 g/dL    Hematocrit 24.1 (L) 36.0 - 46.0 %     (H) 80 - 100 fL    MCH 29.8 26.0 - 34.0 pg    MCHC 29.0 (L) 32.0 - 36.0 g/dL    RDW 20.2 (H) 11.5 - 14.5 %    Platelets 69 (L) 150 - 450 x10*3/uL    Immature Granulocytes %, Automated 0.8 0.0 - 0.9 %    Immature Granulocytes Absolute, Automated 0.03 0.00 - 0.70 x10*3/uL   Comprehensive metabolic panel   Result Value Ref Range    Glucose 135 (H) 74 - 99 mg/dL    Sodium 147 (H) 136 - 145 mmol/L    Potassium 4.4 3.5 - 5.3 mmol/L    Chloride 117 (H) 98 - 107 mmol/L    Bicarbonate 22 21 - 32 mmol/L    Anion Gap 12 10 - 20 mmol/L    Urea Nitrogen 39 (H) 6 - 23 mg/dL    Creatinine 1.59 (H) 0.50 - 1.05 mg/dL    eGFR 37 (L) >60 mL/min/1.73m*2    Calcium 7.8 (L) 8.6 - 10.3 mg/dL    Albumin 2.7 (L) 3.4 - 5.0 g/dL    Alkaline Phosphatase 74 33 - 136 U/L    Total Protein 4.8 (L) 6.4 - 8.2 g/dL    AST 33 9 - 39 U/L    Bilirubin, Total 0.3 0.0 - 1.2 mg/dL    ALT 33 7 - 45 U/L   Magnesium   Result Value Ref Range    Magnesium 2.07 1.60 - 2.40 mg/dL   Phosphorus   Result Value Ref Range    Phosphorus 2.7 2.5 - 4.9 mg/dL   Manual Differential   Result Value Ref Range    Neutrophils %, Manual 92.0 40.0 - 80.0 %    Lymphocytes %, Manual 7.0 13.0 - 44.0 %    Monocytes %, Manual 0.0 2.0 - 10.0 %    Eosinophils %, Manual 0.0 0.0 - 6.0 %    Basophils %, Manual 0.0 0.0 - 2.0 %    Metamyelocytes %, Manual 1.0 0.0 - 0.0 %    Seg Neutrophils Absolute, Manual 3.50 1.20 - 7.00  x10*3/uL    Lymphocytes Absolute, Manual 0.27 (L) 1.20 - 4.80 x10*3/uL    Monocytes Absolute, Manual 0.00 (L) 0.10 - 1.00 x10*3/uL    Eosinophils Absolute, Manual 0.00 0.00 - 0.70 x10*3/uL    Basophils Absolute, Manual 0.00 0.00 - 0.10 x10*3/uL    Metamyelocytes Absolute, Manual 0.04 0.00 - 0.00 x10*3/uL    Total Cells Counted 100     RBC Morphology See Below     Ovalocytes Few     Teardrop Cells Few     Katarzyna Cells Few    Sedimentation rate, automated   Result Value Ref Range    Sedimentation Rate 34 (H) 0 - 30 mm/h   C-reactive protein   Result Value Ref Range    C-Reactive Protein 2.52 (H) <1.00 mg/dL   Creatine Kinase   Result Value Ref Range    Creatine Kinase 67 0 - 215 U/L   Hemoglobin and hematocrit, blood   Result Value Ref Range    Hemoglobin 7.1 (L) 12.0 - 16.0 g/dL    Hematocrit 23.9 (L) 36.0 - 46.0 %   Type and screen   Result Value Ref Range    ABO TYPE O     Rh TYPE POS     ANTIBODY SCREEN NEG    POCT GLUCOSE   Result Value Ref Range    POCT Glucose 94 74 - 99 mg/dL   POCT GLUCOSE   Result Value Ref Range    POCT Glucose 95 74 - 99 mg/dL   POCT GLUCOSE   Result Value Ref Range    POCT Glucose 92 74 - 99 mg/dL   Renal function panel   Result Value Ref Range    Glucose 92 74 - 99 mg/dL    Sodium 152 (H) 136 - 145 mmol/L    Potassium 4.1 3.5 - 5.3 mmol/L    Chloride 119 (H) 98 - 107 mmol/L    Bicarbonate 23 21 - 32 mmol/L    Anion Gap 14 10 - 20 mmol/L    Urea Nitrogen 32 (H) 6 - 23 mg/dL    Creatinine 1.55 (H) 0.50 - 1.05 mg/dL    eGFR 38 (L) >60 mL/min/1.73m*2    Calcium 8.3 (L) 8.6 - 10.3 mg/dL    Phosphorus 3.1 2.5 - 4.9 mg/dL    Albumin 2.7 (L) 3.4 - 5.0 g/dL   CBC   Result Value Ref Range    WBC 3.5 (L) 4.4 - 11.3 x10*3/uL    nRBC 1.1 (H) 0.0 - 0.0 /100 WBCs    RBC 2.33 (L) 4.00 - 5.20 x10*6/uL    Hemoglobin 7.0 (L) 12.0 - 16.0 g/dL    Hematocrit 24.3 (L) 36.0 - 46.0 %     (H) 80 - 100 fL    MCH 30.0 26.0 - 34.0 pg    MCHC 28.8 (L) 32.0 - 36.0 g/dL    RDW 20.7 (H) 11.5 - 14.5 %    Platelets  76 (L) 150 - 450 x10*3/uL   Magnesium   Result Value Ref Range    Magnesium 1.96 1.60 - 2.40 mg/dL   POCT GLUCOSE   Result Value Ref Range    POCT Glucose 61 (L) 74 - 99 mg/dL   POCT GLUCOSE   Result Value Ref Range    POCT Glucose 23 (L) 74 - 99 mg/dL   POCT GLUCOSE   Result Value Ref Range    POCT Glucose 118 (H) 74 - 99 mg/dL   Renal function panel   Result Value Ref Range    Glucose 146 (H) 74 - 99 mg/dL    Sodium 150 (H) 136 - 145 mmol/L    Potassium 4.2 3.5 - 5.3 mmol/L    Chloride 117 (H) 98 - 107 mmol/L    Bicarbonate 25 21 - 32 mmol/L    Anion Gap 12 10 - 20 mmol/L    Urea Nitrogen 31 (H) 6 - 23 mg/dL    Creatinine 1.65 (H) 0.50 - 1.05 mg/dL    eGFR 35 (L) >60 mL/min/1.73m*2    Calcium 8.1 (L) 8.6 - 10.3 mg/dL    Phosphorus 2.7 2.5 - 4.9 mg/dL    Albumin 2.7 (L) 3.4 - 5.0 g/dL   Hemoglobin and hematocrit, blood   Result Value Ref Range    Hemoglobin 6.5 (LL) 12.0 - 16.0 g/dL    Hematocrit 21.7 (L) 36.0 - 46.0 %   Renal function panel   Result Value Ref Range    Glucose 223 (H) 74 - 99 mg/dL    Sodium 146 (H) 136 - 145 mmol/L    Potassium 3.9 3.5 - 5.3 mmol/L    Chloride 114 (H) 98 - 107 mmol/L    Bicarbonate 26 21 - 32 mmol/L    Anion Gap 10 10 - 20 mmol/L    Urea Nitrogen 31 (H) 6 - 23 mg/dL    Creatinine 1.61 (H) 0.50 - 1.05 mg/dL    eGFR 36 (L) >60 mL/min/1.73m*2    Calcium 7.8 (L) 8.6 - 10.3 mg/dL    Phosphorus 2.4 (L) 2.5 - 4.9 mg/dL    Albumin 2.5 (L) 3.4 - 5.0 g/dL   Reticulocytes   Result Value Ref Range    Retic % 0.9 0.5 - 2.0 %    Retic Absolute 0.020 0.018 - 0.083 x10*6/uL    Reticulocyte Hemoglobin 31 28 - 38 pg    Immature Retic fraction 20.1 (H) <=16.0 %   CBC and Auto Differential   Result Value Ref Range    WBC 3.7 (L) 4.4 - 11.3 x10*3/uL    nRBC 1.6 (H) 0.0 - 0.0 /100 WBCs    RBC 2.17 (L) 4.00 - 5.20 x10*6/uL    Hemoglobin 6.5 (LL) 12.0 - 16.0 g/dL    Hematocrit 21.7 (L) 36.0 - 46.0 %     (H) 80 - 100 fL    MCH 29.5 26.0 - 34.0 pg    MCHC 29.4 (L) 32.0 - 36.0 g/dL    RDW 20.3 (H)  "11.5 - 14.5 %    Platelets 82 (L) 150 - 450 x10*3/uL    Neutrophils % 84.8 40.0 - 80.0 %    Immature Granulocytes %, Automated 1.3 (H) 0.0 - 0.9 %    Lymphocytes % 9.6 13.0 - 44.0 %    Monocytes % 4.3 2.0 - 10.0 %    Eosinophils % 0.0 0.0 - 6.0 %    Basophils % 0.0 0.0 - 2.0 %    Neutrophils Absolute 3.17 1.20 - 7.70 x10*3/uL    Immature Granulocytes Absolute, Automated 0.05 0.00 - 0.70 x10*3/uL    Lymphocytes Absolute 0.36 (L) 1.20 - 4.80 x10*3/uL    Monocytes Absolute 0.16 0.10 - 1.00 x10*3/uL    Eosinophils Absolute 0.00 0.00 - 0.70 x10*3/uL    Basophils Absolute 0.00 0.00 - 0.10 x10*3/uL   Iron and TIBC   Result Value Ref Range    Iron 107 35 - 150 ug/dL    UIBC 111 110 - 370 ug/dL    TIBC 218 (L) 240 - 445 ug/dL    % Saturation 49 (H) 25 - 45 %   Ferritin   Result Value Ref Range    Ferritin 748 (H) 8 - 150 ng/mL   POCT GLUCOSE   Result Value Ref Range    POCT Glucose 182 (H) 74 - 99 mg/dL   Prepare RBC: 1 Units   Result Value Ref Range    PRODUCT CODE P1788A77     Unit Number [id number]     Unit ABO O     Unit RH POS     XM INTEP COMP     Dispense Status IS     Blood Expiration Date February 08, 2024 23:59 EST     PRODUCT BLOOD TYPE 5100     UNIT VOLUME 350    Protime-INR   Result Value Ref Range    Protime 11.0 9.8 - 12.8 seconds    INR 1.0 0.9 - 1.1   aPTT   Result Value Ref Range    aPTT 37 27 - 38 seconds   Lactate   Result Value Ref Range    Lactate 0.8 0.4 - 2.0 mmol/L   POCT GLUCOSE   Result Value Ref Range    POCT Glucose 239 (H) 74 - 99 mg/dL     Physical exam:  /66   Pulse 64   Temp 36.1 °C (97 °F) (Temporal)   Resp 14   Ht 1.727 m (5' 7.99\")   Wt 93.2 kg (205 lb 7.5 oz)   LMP  (LMP Unknown)   SpO2 97%   BMI 31.25 kg/m²   GENERAL APPEARANCE: The patient appears lethargic, had actively jerking movements of both shoulders, poor eye contact, nonverbal, not following commands.    She has noticed noticeable facial asymmetry, ptosis, with limited cranial nerve exam due to the " confusion.    She has no asymmetrical activation of bilateral upper or lower extremities to painful stimulation.    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[facility])    Leg swelling    Abdominal cramping    Anemia    Pancytopenia (CMS/[facility])    Atrial fibrillation (CMS/[facility])    CKD stage G3a/A2, GFR 45-59 and albumin creatinine ratio  mg/g (CMS/[facility])    CVA (cerebral vascular accident) (CMS/[facility])    GERD (gastroesophageal reflux disease)    Obstructive lung disease (CMS/[facility])    Benign essential HTN    Pulmonary hypertension (CMS/[facility])    DM type 2 with diabetic peripheral neuropathy (CMS/[facility])    Systemic lupus erythematosus (CMS/[facility])    Moderate protein-calorie malnutrition (CMS/[facility])    Hypotension    This is status epilepticus presenting with lipsmacking, facial twitches, jerking movement bilateral upper extremities including shoulders that relatively responded to IV Keppra (kidney function dose adjusted) in addition to Ativan as needed.  No evidence on brain MRI of acute focal CNS pathology.    Patient has history of systemic lupus erythematous disease cerebritis, currently on CellCept, adrenal insufficiency currently on prednisone.    She had recently developed diarrhea, progressive weakness, increased confusion (history of Zaria virus GI) in November 2023.    I suggest:  Video EEG monitoring.    To consider adding IV valproic acid 500 mg twice daily should seizures continue on maximal allowed IV Keppra dose by lupus    Ativan 1 mg IV/IM every 2-4 hours as needed.    Should patient continue to seize then to consider propofol and intubation to protect airways.    Highly advise multidisciplinary approach including infectious disease, rheumatology and endocrinology evaluation which will be  feasible at [facility] ICU level.    Spinal tap for CSF analysis for possible infectious/viral/autoimmune meningitis or encephalitis    To consider empiric antibacterial and antiviral treatment to cover for possible meningitis, encephalitis in the context of immunosuppression if okay by infectious disease.    Case was discussed in details with the medical staff       I spent 80 minutes in the professional and overall care of this patient.      Jamie Puentes MD

## 2024-01-16 NOTE — PROCEDURES
Pre-Procedure Checklist:  Emergent Line Insertion: No  Type of Line to be Placed: PICC  Consent Obtained: Yes  Emergency Medication Necessary: No  Patient Identified with 2 Independent Identifiers: Yes  Review of Allergies, Anticoagulation, Relevant Labs, ECG/Telemetry: Yes  Risks/Benefits/Alternatives Discussed with Patient/POA/Legal Representative: Yes  Stop Sign on Door: Yes  Time Out Performed: Yes  Catheter Exchange: No    Positioning Checklist:  All People, Including Patient, in the Room with Cap and Mask: Yes  Fluoroscopy Used to Identify Vessel and Guide Insertion: No   Sterile Cover Used: Yes  Full Barrier Precautions Followed (Mask, Cap, Gown, Gloves): Yes  Hands Washed: Yes  Monitors Attached with Sound Alarms On: No  Full Body Sterile Drape (Head-to-Toe) Used to Cover Patient: Yes  Trendelenburg Position (For IJ and Subclavian): No  CHG Skin Prep Used and Allowed to Air Dry to Skin Procedure: Yes    Procedure Checklist:  Blood Aspirated From All Lumens, All Ports Subsequently Flushed: Yes  Catheter Caps Placed on All Lumens; Lumens Clamped: Yes  Maintain Guidewire Control Throughout, Ensuring Guidewire Removal: Yes  Maintain Sterile Field Throughout Insertion: Yes  Catheter Secured: Yes  Confirmatory Test of Venous Placement: Non-Pulsatile Blood    Post Procedure Checklist:  Date and Time Written on Dressing: Yes  Sharp and Wire Count and Safe Disposal of all Sharps/Wires: Yes  Sterile Dressing Applied Per Protocol: Yes  X-ray Ordered or ECG Image: Yes    PICC Insertion Details:  Size (Fr): 4  Lumen Type: Dual  Catheter to Vein Ratio Less Than 50%: Yes  Total Length (cm): 44  External Length (cm): 1  Orientation: Right  Location:  Brachial  Site Prep: Chlorohexidine; Usual sterile procedure followed  Local Anesthetic: Injectable/Subcutaneous  Indication: TPN, parenteral medications, frequent phlebotomy  Insertion Team Members in the Room: Nurse myriam  Initial Extremity Circumference (cm): 37  Insertion  Attempts:1  Patient Tolerance: TIMUR, pt not following commands  Comfort Measures: Subcutaneous anesthetic; Verbal  Procedure Location: Bedside  Safety Measures: Patient specific safety measures addressed with RN  Estimated Blood Loss (mL): 1  Vessel Fully Compressible Proximally and Distally to Insertion Site: Yes  Brisk Blood Return Obtained and Line Draws Easily: Yes  Tip Location: Cavoatrial Junction  Line Confirmation: Intravascular ECG  Lot #: [id number]   : Bard  PICC Line Exp Date: 06 30 2024  Securement: Stat Lock  Post Procedure Checklist: Handoff with RN; Obtain all new IV tubing prior to use; Bed at lowest level and wheels locked; Line discharge information at bedside.  Additional Details: Line was inserted using Modified Seldinger's Technique.   Placed by: ARLENE Farrar RN             4 German 44 CM TOTAL LENGTH WITH 1 CM EXTERNAL FROM INSERTION SITE BARD SINGLE LUMEN POWER PICC INSERTED USING ULTRASOUND GUIDANCE WITH SINGLE ATTEMPT. SITE WAS THOROUGHLY CLEANSED WITH CHLORAPREP AND USUAL STERILE PRECAUTIONS WERE OBSERVED. PICC TIP CONFIRMED IN CAVO ATRIAL JUNCTION BY P WAVE AMPLITUDE FROM SHERLOCK INTRAVASCULAR ECG. PICC SECURED WITH STAT LOCK AND DRESSED WITH BIOPATCH AND TEGADERM. PICC FLUSHES EASILY AND HAS BRISK BLOOD RETURN. PICC LINE IS READY FOR IMMEDIATE USE.

## 2024-01-16 NOTE — CONSULTS
Inpatient consult to Neurology  Consult performed by: Marleny Shi, JIM-CNP  Consult ordered by: Aishwarya Dee MD      Called to bedside this morning by primary team. Pt having continuous facial twitching when she is awake. Rapid eye blinking and lip smacking. Nonverbal and not following commands. Spoke to neurologist on call Dr. Puentes- recommends video EEG, Keppra load and maintenance dose, and Ativan 1mg IV now. Updated primary team, Dr. Dee.     History Of Present Illness  Bing Holliday is a 63 y.o. female presenting with confusion, weakness, back pain. Pt was recently discharged to a skilled nursing facility after being admitted for generalized weakness and UTI. Over the last few days, she has been weaker and more confused.She was speaking to people who weren't there and was not able to ambulate on her own. On day of presentation, she was shivering and shaking but did not have a fever or feel warm. Her son thought her blood sugar might be low so he had her eat breakfast but her symptoms did not resolve. No recent falls or trauma. In the ED, her HR was 114, BP 84/69, blood sugar 66. CT head showed no acute intracranial hemorrhage or infarct. She was admitted for secondary adrenal insufficiency. 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     Pt was evaluated this after noon at bedside with Dr. Puentes. She had received Ativan and 1gm Keppra load but still had rapid eye blinking. She was given an additional 1mg IV Ativan, 500mg Keppra, and transferred to ICU for closer monitoring. Dr. Puentes spoke to Dr. Dee regarding plan of care. Recommended pt be transferred to [facility] for inpatient rheumatology consult.    Past Medical History  Past Medical History:   Diagnosis Date    Acute upper respiratory infection, unspecified 03/04/2020    Acute URI    Acute upper respiratory infection, unspecified 09/30/2015    URTI (acute upper respiratory infection)     Arthritis     Body mass index (BMI) 23.0-23.9, adult 10/15/2021    BMI 23.0-23.9, adult    Body mass index (BMI) 33.0-33.9, adult 03/04/2020    BMI 33.0-33.9,adult    Cardiomegaly 08/27/2013    Left ventricular hypertrophy    Chronic kidney disease, stage 3 unspecified (CMS/HCC) 07/02/2013    Chronic kidney disease, stage III (moderate)    Disease of pericardium, unspecified 07/02/2013    Pericardial disease    Encounter for follow-up examination after completed treatment for conditions other than malignant neoplasm 10/06/2022    Hospital discharge follow-up    Generalized contraction of visual field, right eye 01/29/2015    Generalized contraction of visual field of right eye    Homonymous bilateral field defects, right side 04/29/2016    Homonymous bilateral field defects of right side    Hypertensive chronic kidney disease with stage 1 through stage 4 chronic kidney disease, or unspecified chronic kidney disease 07/02/2013    Nephrosclerosis    Laceration without foreign body, left foot, initial encounter 07/03/2018    Foot laceration, left, initial encounter    Migraine with aura, not intractable, without status migrainosus 10/24/2022    Ocular migraine    Other conditions influencing health status 07/02/2013    Chronic Glomerulonephritis In Diseases Classified Elsewhere    Other conditions influencing health status 07/02/2013    Progressive Familial Myoclonic Epilepsy    Other conditions influencing health status 07/02/2013    Protein S Deficiency    Other conditions influencing health status 05/22/2015    Familial Combined Hyperlipidemia    Other conditions influencing health status 10/24/2022    IDDM (insulin dependent diabetes mellitus)    Other conditions influencing health status 03/14/2022    Diabetes mellitus, insulin dependent (IDDM), uncontrolled    Other long term (current) drug therapy 10/24/2022    Long-term use of Plaquenil    Personal history of diseases of the blood and blood-forming organs and  certain disorders involving the immune mechanism 07/02/2013    History of thrombocytopenia    Personal history of diseases of the skin and subcutaneous tissue 08/11/2015    History of foot ulcer    Personal history of nephrotic syndrome 07/02/2013    History of nephrotic syndrome    Personal history of other diseases of the circulatory system 08/27/2013    History of sinus tachycardia    Personal history of other diseases of the nervous system and sense organs     History of cataract    Personal history of other diseases of the respiratory system     History of bronchitis    Personal history of other infectious and parasitic diseases 07/02/2013    History of hepatitis    Personal history of other specified conditions     History of shortness of breath    Personal history of other specified conditions 08/27/2013    History of edema    Puckering of macula, right eye 10/24/2022    ERM OD (epiretinal membrane, right eye)    Raynaud's syndrome without gangrene 07/02/2013    Raynaud's disease    Systemic lupus erythematosus, unspecified (CMS/HCC) 07/24/2015    SLE (systemic lupus erythematosus)    Systemic lupus erythematosus, unspecified (CMS/HCC) 07/24/2015    SLE (systemic lupus erythematosus)    Systemic lupus erythematosus, unspecified (CMS/HCC) 07/24/2015    Systemic lupus    Type 2 diabetes mellitus with diabetic nephropathy (CMS/HCC) 07/02/2013    Type 2 diabetes with nephropathy    Type 2 diabetes mellitus with mild nonproliferative diabetic retinopathy without macular edema, left eye (CMS/HCC) 07/27/2015    Non-proliferative diabetic retinopathy, left eye    Type 2 diabetes mellitus with mild nonproliferative diabetic retinopathy without macular edema, unspecified eye (CMS/HCC) 07/24/2015    Mild non proliferative diabetic retinopathy    Type 2 diabetes mellitus with proliferative diabetic retinopathy without macular edema, right eye (CMS/HCC) 07/27/2015    Proliferative diabetic retinopathy of right eye    Type  2 diabetes mellitus with proliferative diabetic retinopathy without macular edema, unspecified eye (CMS/[facility]) 07/24/2015    Diabetic proliferative retinopathy    Unspecified acute and subacute iridocyclitis 07/24/2015    Acute iritis, right eye    Unspecified open wound, left foot, sequela 07/03/2018    Wound, open, foot, left, sequela     Surgical History  Past Surgical History:   Procedure Laterality Date    ANKLE SURGERY  01/29/2015    Ankle Surgery    CHOLECYSTECTOMY  01/29/2015    Cholecystectomy    CT GUIDED PERCUTANEOUS BIOPSY BONE DEEP  5/4/2021    CT GUIDED PERCUTANEOUS BIOPSY BONE DEEP 5/4/2021 [facility] CLINICAL LEGACY    EYE SURGERY  03/06/2015    Eye Surgery    FOOT SURGERY  01/29/2015    Foot Surgery    MR HEAD ANGIO WO IV CONTRAST  7/26/2013    MR HEAD ANGIO WO IV CONTRAST 7/26/2013 [facility] CLINICAL LEGACY    MR HEAD ANGIO WO IV CONTRAST  9/17/2021    MR HEAD ANGIO WO IV CONTRAST 9/17/2021 [facility]GAC[facility]    MR HEAD ANGIO WO IV CONTRAST  3/25/2023    MR HEAD ANGIO WO IV CONTRAST [facility]    MR NECK ANGIO WO IV CONTRAST  7/26/2013    MR NECK ANGIO WO IV CONTRAST 7/26/2013 [facility] CLINICAL LEGACY    MR NECK ANGIO WO IV CONTRAST  9/17/2021    MR NECK ANGIO WO IV CONTRAST 9/17/2021 [facility]    MR NECK ANGIO WO IV CONTRAST  3/25/2023    MR NECK ANGIO WO IV CONTRAST [facility]    OTHER SURGICAL HISTORY  01/29/2015    Creation Of Pericardial Window    OTHER SURGICAL HISTORY  01/29/2015    Quadricepsplasty    TOTAL HIP ARTHROPLASTY  01/29/2015    Hip Replacement     Social History  Social History     Tobacco Use    Smoking status: Never    Smokeless tobacco: Never   Vaping Use    Vaping Use: Never used   Substance Use Topics    Alcohol use: Never    Drug use: Never     Allergies  Ace inhibitors, Hydroxychloroquine, Lisinopril, Penicillins, and Sulfa (sulfonamide antibiotics)  Medications Prior to Admission   Medication Sig Dispense Refill Last Dose    acetaminophen (Tylenol) 325 mg tablet Take 2 tablets (650  mg) by mouth every 4 hours if needed for mild pain (1 - 3).   1/14/2024    amLODIPine (Norvasc) 2.5 mg tablet Take 1 tablet (2.5 mg) by mouth once daily.   1/14/2024    atorvastatin (Lipitor) 40 mg tablet Take 1 tablet (40 mg) by mouth once daily. 90 tablet 3 1/14/2024    belimumab (Benlysta) 400 mg recon soln IV injection Infuse 10 mg/kg into a venous catheter every 28 (twenty-eight) days.  (900mg per dose)   Past Month    blood-glucose sensor (Dexcom G6 Sensor) device Use to check sugars 3 times daily 4 each 2     Ca carb-D3-mag uj-foc-fxzv-Zn 300 mg-20 mcg- 25 mg-0.5 mg tablet Take 1 tablet by mouth 2 times a day.   1/14/2024    Dexcom G4 platinum  (Dexcom G6 ) misc Use as instructed 1 each 0     Dexcom G4 platinum transmitter (Dexcom G6 Transmitter) device Use as instructed 1 each 0     fluticasone-umeclidin-vilanter (TRELEGY-ELLIPTA) 200-62.5-25 mcg blister with device Inhale 1 puff once daily. 60 each 5 1/14/2024    folic acid (Folvite) 1 mg tablet TAKE 1 TABLET BY MOUTH EVERY DAY 90 tablet 1 1/14/2024    magnesium oxide (Mag-Ox) 400 mg tablet Take 1 tablet (400 mg) by mouth once daily.   1/14/2024    melatonin 5 mg tablet Take 1 tablet (5 mg) by mouth once daily at bedtime.       multivitamin tablet Take 1 tablet by mouth once daily.   1/14/2024    mycophenolate (Cellcept) 500 mg tablet Take 2 tablets (1,000 mg) by mouth twice a day.   1/14/2024    pantoprazole (ProtoNix) 40 mg EC tablet TAKE 1 TABLET BY MOUTH EVERY DAY 90 tablet 1 1/14/2024    predniSONE (Deltasone) 10 mg tablet Take 1 tablet (10 mg) by mouth once daily in the morning. Take first thing when you wake up every morning. 30 tablet 2 1/14/2024    predniSONE (Deltasone) 5 mg tablet Take 1 tablet (5 mg) by mouth see administration instructions. Take one tablet everyday at 2PM scheduled 30 tablet 2 1/14/2024    risperiDONE (RisperDAL) 0.5 mg tablet TAKE 1 TABLET BY MOUTH AT BEDTIME (Patient not taking: Reported on 1/15/2024) 30  "tablet 0 Not Taking    torsemide (Demadex) 10 mg tablet TAKE 1 TABLET BY MOUTH EVERY DAY (Patient not taking: Reported on 1/15/2024) 90 tablet 0 Not Taking       Review of Systems  Neurological Exam  Physical Exam  Eyes opened, tracks with eyes. Rapid horizontal eye movements intermittently. Rapid eye blinking. Lip smacking and facial twitching seemed to subside after initial dose of Ativan and Keppra. She is currently non-verbal, no following commands.  PERRL, no blink to threat but makes eye contact briefly with examiner. Face appears symmetrical.   Moving all extremities to noxious stimulation.     Last Recorded Vitals  Blood pressure 135/72, pulse 71, temperature 36 °C (96.8 °F), temperature source Temporal, resp. rate 16, height 1.727 m (5' 7.99\"), weight 93.2 kg (205 lb 7.5 oz), SpO2 99 %.    Relevant Results  Scheduled medications  apixaban, 2.5 mg, oral, BID  atorvastatin, 40 mg, oral, Daily  dextrose, 1,000 mL, intravenous, q6h  folic acid, 1 mg, oral, Daily  hydrocortisone sodium succinate, 75 mg, intravenous, q6h  levETIRAcetam, 500 mg, intravenous, q12h  lidocaine, 5 mL, infiltration, Once  mycophenolate, 1,000 mg, oral, BID  polyethylene glycol, 17 g, oral, Daily  sodium chloride 0.9%, 10 mL, intra-catheter, q12h      Continuous medications  Adult Clinimix Parenteral Nutrition Continuous, 70 mL/hr      PRN medications  PRN medications: acetaminophen, alteplase, dextrose 10 % in water (D10W), dextrose, glucagon, sodium chloride 0.9%    Results for orders placed or performed during the hospital encounter of 01/14/24 (from the past 24 hour(s))   Hemoglobin and hematocrit, blood   Result Value Ref Range    Hemoglobin 7.1 (L) 12.0 - 16.0 g/dL    Hematocrit 23.9 (L) 36.0 - 46.0 %   Type and screen   Result Value Ref Range    ABO TYPE O     Rh TYPE POS     ANTIBODY SCREEN NEG    POCT GLUCOSE   Result Value Ref Range    POCT Glucose 94 74 - 99 mg/dL   POCT GLUCOSE   Result Value Ref Range    POCT Glucose 95 74 " - 99 mg/dL   POCT GLUCOSE   Result Value Ref Range    POCT Glucose 92 74 - 99 mg/dL   Renal function panel   Result Value Ref Range    Glucose 92 74 - 99 mg/dL    Sodium 152 (H) 136 - 145 mmol/L    Potassium 4.1 3.5 - 5.3 mmol/L    Chloride 119 (H) 98 - 107 mmol/L    Bicarbonate 23 21 - 32 mmol/L    Anion Gap 14 10 - 20 mmol/L    Urea Nitrogen 32 (H) 6 - 23 mg/dL    Creatinine 1.55 (H) 0.50 - 1.05 mg/dL    eGFR 38 (L) >60 mL/min/1.73m*2    Calcium 8.3 (L) 8.6 - 10.3 mg/dL    Phosphorus 3.1 2.5 - 4.9 mg/dL    Albumin 2.7 (L) 3.4 - 5.0 g/dL   CBC   Result Value Ref Range    WBC 3.5 (L) 4.4 - 11.3 x10*3/uL    nRBC 1.1 (H) 0.0 - 0.0 /100 WBCs    RBC 2.33 (L) 4.00 - 5.20 x10*6/uL    Hemoglobin 7.0 (L) 12.0 - 16.0 g/dL    Hematocrit 24.3 (L) 36.0 - 46.0 %     (H) 80 - 100 fL    MCH 30.0 26.0 - 34.0 pg    MCHC 28.8 (L) 32.0 - 36.0 g/dL    RDW 20.7 (H) 11.5 - 14.5 %    Platelets 76 (L) 150 - 450 x10*3/uL   Magnesium   Result Value Ref Range    Magnesium 1.96 1.60 - 2.40 mg/dL   POCT GLUCOSE   Result Value Ref Range    POCT Glucose 61 (L) 74 - 99 mg/dL   POCT GLUCOSE   Result Value Ref Range    POCT Glucose 23 (L) 74 - 99 mg/dL   POCT GLUCOSE   Result Value Ref Range    POCT Glucose 118 (H) 74 - 99 mg/dL   Renal function panel   Result Value Ref Range    Glucose 146 (H) 74 - 99 mg/dL    Sodium 150 (H) 136 - 145 mmol/L    Potassium 4.2 3.5 - 5.3 mmol/L    Chloride 117 (H) 98 - 107 mmol/L    Bicarbonate 25 21 - 32 mmol/L    Anion Gap 12 10 - 20 mmol/L    Urea Nitrogen 31 (H) 6 - 23 mg/dL    Creatinine 1.65 (H) 0.50 - 1.05 mg/dL    eGFR 35 (L) >60 mL/min/1.73m*2    Calcium 8.1 (L) 8.6 - 10.3 mg/dL    Phosphorus 2.7 2.5 - 4.9 mg/dL    Albumin 2.7 (L) 3.4 - 5.0 g/dL   POCT GLUCOSE   Result Value Ref Range    POCT Glucose 182 (H) 74 - 99 mg/dL     Vascular US Lower Extremity Venous Duplex Bilateral    Result Date: 1/15/2024            [facility] [address] [address] [place] [address]     Tel [phone] Fax  [phone]  Vascular Lab Report  [facility] US LOWER EXTREMITY VENOUS DUPLEX BILATERAL Patient Name:      LISBET GUTIERRES      Reading Physician:  [id number] Mena Arellano MD Study Date:        1/15/2024             Ordering Provider:  [id number] HERIBERTO ROMERO MRN/PID:           [id number]              Fellow: Accession#:        [id number]          Technologist:       Ginna Travis RVHEENA Date of Birth/Age: 1960 / 63 years Technologist 2: Gender:            F                     Encounter#:         [id number] Admission Status:  Inpatient             Location Performed: [facility]  Diagnosis/ICD: Localized swelling, mass and lump, neck-R22.1 Indication:    Limb swelling CPT Codes:     96884 Peripheral venous duplex scan for DVT complete  Pertinent History: HTN, Hyperlipidemia, CVA and Anticoagulation. Afib, DM.  CONCLUSIONS: Right Lower Venous: No evidence of acute deep vein thrombus visualized in the right lower extremity. Cannot rule out thrombus in non-visualized peroneal vein due to swelling and edema. Left Lower Venous: No evidence of acute deep vein thrombus visualized in the left lower extremity. Cannot rule out thrombus in non-visualized posterior tibial and peroneal veins due to swelling and edema. Additional Findings: Images are limited due to patient inability to tolerate touch and compressions to LE.  Comparison: Compared with study from 12/10/2022, no significant change.  Imaging & Doppler Findings:  Right                 Compressible Thrombus        Flow Distal External Iliac     Yes        None   Spontaneous/Phasic CFV                       Yes        None   Spontaneous/Phasic PFV                       Yes        None FV Proximal               Yes        None   Spontaneous/Phasic FV Mid                    Yes         None FV Distal                 Yes        None Popliteal                 Yes        None   Spontaneous/Phasic PTV                       Yes        None  Left                  Compress Thrombus        Flow Distal External Iliac   Yes      None   Spontaneous/Phasic CFV                     Yes      None   Spontaneous/Phasic PFV                     Yes      None FV Proximal             Yes      None   Spontaneous/Phasic FV Mid                  Yes      None FV Distal               Yes      None Popliteal               Yes      None   Spontaneous/Phasic  [id number] Mena Arellano MD Electronically signed by [id number] Mena Arellano MD on 1/15/2024 at 8:42:18 PM  ** Final **     MR brain w and wo IV contrast    Result Date: 1/15/2024  Interpreted By:  Sanchez Sandoval, STUDY: MR BRAIN W AND WO IV CONTRAST;  1/15/2024 4:30 pm   INDICATION: Signs/Symptoms:encephalopathy, c/f neuro lupus vs cva vs other acute intracranial pathology.   COMPARISON: March 2023.   ACCESSION NUMBER(S): [id number]   ORDERING CLINICIAN: JAYLA SORIANO   TECHNIQUE: The brain was studied in the sagittal, axial and coronal planes utilizing FLAIR, T1 and T2 weighted images.     Following intravenous injection of gadolinium contrast, T1 weighted fat suppressed multiplanar images were also performed.   FINDINGS: There is a normal-size ventricular system.  There is no evidence of intracranial mass or extra-axial collection.  The skull base, paranasal sinuses and orbital structures are unremarkable. Diffusion weighted images and associated ADC maps of the brain were unremarkable.  There is no evidence of diffusion restriction to suggest the presence of acute infarction.   There is focal encephalomalacia in the left occipital region unchanged from the previous exam. Gradient echo T2 weighted images demonstrate hemosiderin deposition in the left occipital region.. Findings are consistent with remote infarction or contusion.   There are  scattered foci of increased signal in the subcortical and periventricular white matter best appreciated on FLAIR images. These likely represent foci of demyelination of uncertain cause and significance and may be physiologic at this patient's stated age.       *There are patchy and confluence regions of abnormal signal in the periventricular white matter bilaterally. These are nonspecific findings consistent with demyelination due to ischemic, degenerative or primary demyelinating processes. *Unchanged previous infarction left occipital pole *There is no evidence of mass, iacute infarction or hemorrhage. * There is no measurable change compared to the previous exam.   MACRO: none   Signed by: Sanchez Sandoval 1/15/2024 4:48 PM Dictation workstation:   [id number]    ECG 12 lead    Result Date: 1/15/2024  Atrial fibrillation with rapid ventricular response ST & T wave abnormality, consider inferior ischemia or digitalis effect Abnormal ECG When compared with ECG of 17-DEC-2023 03:26, Atrial fibrillation has replaced Sinus rhythm Vent. rate has increased BY  57 BPM ST now depressed in Inferior leads ST now depressed in Anterior leads T wave inversion now evident in Lateral leads QT has lengthened    ECG 12 lead    Result Date: 1/15/2024  Atrial fibrillation Moderate voltage criteria for LVH, may be normal variant ST & T wave abnormality, consider inferolateral ischemia or digitalis effect Prolonged QT Abnormal ECG When compared with ECG of 14-JAN-2024 14:13, (unconfirmed) ST elevation now present in Lateral leads    CT chest abdomen pelvis wo IV contrast    Result Date: 1/14/2024  STUDY: CT Chest, Abdomen, and Pelvis without IV Contrast; 01/14/2024 at 5:45 PM INDICATION: Sepsis. Generalized abdominal pain. JED on CKD. COMPARISON: CT abdomen and pelvis 11/20/23, 08/28/23. CT CAP 03/21/23. XR chest 01/14/24, 12/17/23, 11/20/23. ACCESSION NUMBER(S): [id number] ORDERING CLINICIAN: Jaziel Tyler TECHNIQUE: CT of the chest,  abdomen, and pelvis was performed.  Contiguous axial images were obtained at 3 mm slice thickness through the chest, abdomen, and pelvis.  Coronal and sagittal reconstructions at 3 mm slice thickness were performed.  No intravenous contrast was administered.  FINDINGS: Please note that the evaluation of vessels, lymph nodes and organs is limited without intravenous contrast. CHEST: MEDIASTINUM: Heart is enlarged.  Atherosclerosis of the thoracic aorta and coronary arteries is present.  The heart is without pericardial effusion. LUNGS/PLEURA: There is no pleural effusion, pleural thickening, or pneumothorax. The airways are patent. Lungs are clear without consolidation, interstitial disease, or suspicious nodules.  Atelectatic changes are present bilaterally. Patient motion limits assessment. LYMPH NODES: Thoracic lymph nodes are not enlarged. ABDOMEN:  LIVER: No hepatomegaly.  Smooth surface contour.  Normal attenuation.  BILE DUCTS: No intrahepatic or extrahepatic biliary ductal dilatation.  GALLBLADDER: The gallbladder is absent or severely contracted.  STOMACH: No abnormalities identified.  PANCREAS: No masses or ductal dilatation.  SPLEEN: No splenomegaly or focal splenic lesion.  ADRENAL GLANDS: No thickening or nodules.  KIDNEYS AND URETERS: Kidneys are normal in size and location.  Punctate nonobstructing RIGHT renal stone noted.  PELVIS:  BLADDER: No abnormalities identified.  REPRODUCTIVE ORGANS: No abnormalities identified.  BOWEL: Colonic diverticulosis is present without evidence of acute diverticulitis.  VESSELS: No abnormalities identified.  Abdominal aorta is normal in caliber.  PERITONEUM/RETROPERITONEUM/LYMPH NODES: No free fluid.  No pneumoperitoneum. No lymphadenopathy.  ABDOMINAL WALL: No abnormalities identified. SOFT TISSUES: No abnormalities identified.  BONES: No acute fracture or aggressive osseous lesion.  Degenerative changes noted about the shoulders.  RIGHT hip arthroplasty is present.  Artifact from the prosthesis limits assessment of the pelvis. Prosthesis appears well seated.  Severe degenerative change of LEFT hip is present.  Multilevel degenerative changes are seen throughout the spine.  Mild scoliosis of the thoracolumbar spine is present. Multilevel degenerative changes noted throughout the lumbar spine with severe disc space narrowing and degenerative endplate change at L2-L5.    1. Cardiomegaly with coronary artery disease. 2. Colonic diverticulosis is present without evidence of acute diverticulitis. 3. Nonobstructing right-sided nephrolithiasis. Signed by Claudio Mehta II, MD    CT head wo IV contrast    Result Date: 1/14/2024  Interpreted By:  Eugene Larsen, STUDY: CT of the brain without contrast dated  1/14/2024.   INDICATION: Signs/Symptoms:Confusion   COMPARISON: CT of the brain dated 12/17/2020   ACCESSION NUMBER(S): [id number]   ORDERING CLINICIAN: KEVIN SHAH   TECHNIQUE: Axial non-contrast CT of the brain was performed. Sagittal and coronal 2D reformats were obtained.   FINDINGS: BRAIN PARENCHYMA:   No acute intracranial hemorrhage or infarction is evident.  There is a focus left occipital encephalomalacia. Is similar to the prior exam. There is incidental note of basal ganglia calcifications.   VENTRICLES AND EXTRA-AXIAL SPACES:   No hydrocephalus or midline shift is evident.  There is mild focal ex vacuo dilation of the posterior horn of the left lateral ventricle related to the occipital encephalomalacia.   INTRACRANIAL VESSELS:   Calcified atheromatous disease is seen of the visualized carotid and vertebrobasilar arterial trees.   SINUSES AND MASTOIDS:   Mucosal thickening is seen in the maxillary sinuses and multiple ethmoid air cells with complete opacification of some posterior right ethmoid air cells. There is an air-fluid level in the right sphenoid sinus. Mucoperiosteal thickening is seen in the maxillary sinuses right greater than left compatible with sequelae of  chronic sinus disease. Mastoid air cells are clear.   OSSEOUS STRUCTURES:   No osseous injury is evident.   SOFT TISSUES:   Visualized associated soft tissues are grossly unremarkable.       1. No acute intracranial hemorrhage or infarction is evident. If there remains concern for acute cerebral insult, MRI is recommended. 2. Mucosal thickening in the sinuses with air-fluid level in the right sphenoid sinus as seen with acute sinusitis.   Signed by: Eugene Larsen 1/14/2024 4:34 PM Dictation workstation:   [id number]    XR chest 1 view    Result Date: 1/14/2024  STUDY: Chest Radiograph;  01/14/2024 at 2:39 PM INDICATION: Sepsis. Generalized weakness. COMPARISON: XR chest 11/20/23, 10/19/23, 08/27/23. ACCESSION NUMBER(S): [id number] ORDERING CLINICIAN: Jaziel Tyler TECHNIQUE:  Frontal chest was obtained at 1439 hours. FINDINGS: CARDIOMEDIASTINAL SILHOUETTE: Cardiomediastinal silhouette is top limits of normal for technique.  LUNGS: Lungs are clear.  ABDOMEN: No remarkable upper abdominal findings.  BONES: No acute osseous changes.    No evidence consolidating infiltrate or effusion. Signed by Can Lambert MD              I have personally reviewed the following imaging results Vascular US Lower Extremity Venous Duplex Bilateral    Result Date: 1/15/2024            [facility] [address] [place] [address]     Tel [phone] Fax [phone]  [facility] Lab Report  [facility] Patient Name:      LISBET GUTIERRES      Reading Physician:  [id number] Mena Arellano MD Study Date:        1/15/2024             Ordering Provider:  [id number] ROMERO MRN/PID:           [id number]              Fellow: Accession#:        [id number]          Technologist:       Ginna Travis RVHEENA Date of Birth/Age: 1960 / 63 years Technologist 2:  Gender:            F                     Encounter#:         [id number] Admission Status:  Inpatient             Location Performed: [facility]  Diagnosis/ICD: Localized swelling, mass and lump, neck-R22.1 Indication:    Limb swelling CPT Codes:     94265 Peripheral venous duplex scan for DVT complete  Pertinent History: HTN, Hyperlipidemia, CVA and Anticoagulation. Afib, DM.  CONCLUSIONS: Right Lower Venous: No evidence of acute deep vein thrombus visualized in the right lower extremity. Cannot rule out thrombus in non-visualized peroneal vein due to swelling and edema. Left Lower Venous: No evidence of acute deep vein thrombus visualized in the left lower extremity. Cannot rule out thrombus in non-visualized posterior tibial and peroneal veins due to swelling and edema. Additional Findings: Images are limited due to patient inability to tolerate touch and compressions to LE.  Comparison: Compared with study from 12/10/2022, no significant change.  Imaging & Doppler Findings:  Right                 Compressible Thrombus        Flow Distal External Iliac     Yes        None   Spontaneous/Phasic CFV                       Yes        None   Spontaneous/Phasic PFV                       Yes        None FV Proximal               Yes        None   Spontaneous/Phasic FV Mid                    Yes        None FV Distal                 Yes        None Popliteal                 Yes        None   Spontaneous/Phasic PTV                       Yes        None  Left                  Compress Thrombus        Flow Distal External Iliac   Yes      None   Spontaneous/Phasic CFV                     Yes      None   Spontaneous/Phasic PFV                     Yes      None FV Proximal             Yes      None   Spontaneous/Phasic FV Mid                  Yes      None FV Distal               Yes      None Popliteal               Yes      None   Spontaneous/Phasic  [id number]  Mena Arellano MD Electronically signed by [id number] Mena Arellano MD on 1/15/2024 at 8:42:18 PM  ** Final **     MR brain w and wo IV contrast    Result Date: 1/15/2024  Interpreted By:  Sanchez Sandoval, STUDY: MR BRAIN W AND WO IV CONTRAST;  1/15/2024 4:30 pm   INDICATION: Signs/Symptoms:encephalopathy, c/f neuro lupus vs cva vs other acute intracranial pathology.   COMPARISON: March 2023.   ACCESSION NUMBER(S): [id number]   ORDERING CLINICIAN: JAYLA SORIANO   TECHNIQUE: The brain was studied in the sagittal, axial and coronal planes utilizing FLAIR, T1 and T2 weighted images.     Following intravenous injection of gadolinium contrast, T1 weighted fat suppressed multiplanar images were also performed.   FINDINGS: There is a normal-size ventricular system.  There is no evidence of intracranial mass or extra-axial collection.  The skull base, paranasal sinuses and orbital structures are unremarkable. Diffusion weighted images and associated ADC maps of the brain were unremarkable.  There is no evidence of diffusion restriction to suggest the presence of acute infarction.   There is focal encephalomalacia in the left occipital region unchanged from the previous exam. Gradient echo T2 weighted images demonstrate hemosiderin deposition in the left occipital region.. Findings are consistent with remote infarction or contusion.   There are scattered foci of increased signal in the subcortical and periventricular white matter best appreciated on FLAIR images. These likely represent foci of demyelination of uncertain cause and significance and may be physiologic at this patient's stated age.       *There are patchy and confluence regions of abnormal signal in the periventricular white matter bilaterally. These are nonspecific findings consistent with demyelination due to ischemic, degenerative or primary demyelinating processes. *Unchanged previous infarction left occipital pole *There is no evidence of  mass, iacute infarction or hemorrhage. * There is no measurable change compared to the previous exam.   MACRO: none   Signed by: Sanchez Sandoval 1/15/2024 4:48 PM Dictation workstation:   [id number]    ECG 12 lead    Result Date: 1/15/2024  Atrial fibrillation with rapid ventricular response ST & T wave abnormality, consider inferior ischemia or digitalis effect Abnormal ECG When compared with ECG of 17-DEC-2023 03:26, Atrial fibrillation has replaced Sinus rhythm Vent. rate has increased BY  57 BPM ST now depressed in Inferior leads ST now depressed in Anterior leads T wave inversion now evident in Lateral leads QT has lengthened    ECG 12 lead    Result Date: 1/15/2024  Atrial fibrillation Moderate voltage criteria for LVH, may be normal variant ST & T wave abnormality, consider inferolateral ischemia or digitalis effect Prolonged QT Abnormal ECG When compared with ECG of 14-JAN-2024 14:13, (unconfirmed) ST elevation now present in Lateral leads    CT chest abdomen pelvis wo IV contrast    Result Date: 1/14/2024  STUDY: CT Chest, Abdomen, and Pelvis without IV Contrast; 01/14/2024 at 5:45 PM INDICATION: Sepsis. Generalized abdominal pain. JED on CKD. COMPARISON: CT abdomen and pelvis 11/20/23, 08/28/23. CT CAP 03/21/23. XR chest 01/14/24, 12/17/23, 11/20/23. ACCESSION NUMBER(S): [id number] ORDERING CLINICIAN: Jaziel Tyler TECHNIQUE: CT of the chest, abdomen, and pelvis was performed.  Contiguous axial images were obtained at 3 mm slice thickness through the chest, abdomen, and pelvis.  Coronal and sagittal reconstructions at 3 mm slice thickness were performed.  No intravenous contrast was administered.  FINDINGS: Please note that the evaluation of vessels, lymph nodes and organs is limited without intravenous contrast. CHEST: MEDIASTINUM: Heart is enlarged.  Atherosclerosis of the thoracic aorta and coronary arteries is present.  The heart is without pericardial effusion. LUNGS/PLEURA: There is no pleural  effusion, pleural thickening, or pneumothorax. The airways are patent. Lungs are clear without consolidation, interstitial disease, or suspicious nodules.  Atelectatic changes are present bilaterally. Patient motion limits assessment. LYMPH NODES: Thoracic lymph nodes are not enlarged. ABDOMEN:  LIVER: No hepatomegaly.  Smooth surface contour.  Normal attenuation.  BILE DUCTS: No intrahepatic or extrahepatic biliary ductal dilatation.  GALLBLADDER: The gallbladder is absent or severely contracted.  STOMACH: No abnormalities identified.  PANCREAS: No masses or ductal dilatation.  SPLEEN: No splenomegaly or focal splenic lesion.  ADRENAL GLANDS: No thickening or nodules.  KIDNEYS AND URETERS: Kidneys are normal in size and location.  Punctate nonobstructing RIGHT renal stone noted.  PELVIS:  BLADDER: No abnormalities identified.  REPRODUCTIVE ORGANS: No abnormalities identified.  BOWEL: Colonic diverticulosis is present without evidence of acute diverticulitis.  VESSELS: No abnormalities identified.  Abdominal aorta is normal in caliber.  PERITONEUM/RETROPERITONEUM/LYMPH NODES: No free fluid.  No pneumoperitoneum. No lymphadenopathy.  ABDOMINAL WALL: No abnormalities identified. SOFT TISSUES: No abnormalities identified.  BONES: No acute fracture or aggressive osseous lesion.  Degenerative changes noted about the shoulders.  RIGHT hip arthroplasty is present. Artifact from the prosthesis limits assessment of the pelvis. Prosthesis appears well seated.  Severe degenerative change of LEFT hip is present.  Multilevel degenerative changes are seen throughout the spine.  Mild scoliosis of the thoracolumbar spine is present. Multilevel degenerative changes noted throughout the lumbar spine with severe disc space narrowing and degenerative endplate change at L2-L5.    1. Cardiomegaly with coronary artery disease. 2. Colonic diverticulosis is present without evidence of acute diverticulitis. 3. Nonobstructing right-sided  nephrolithiasis. Signed by Claudio Mehta II, MD    CT head wo IV contrast    Result Date: 1/14/2024  Interpreted By:  Eugene Larsen, STUDY: CT of the brain without contrast dated  1/14/2024.   INDICATION: Signs/Symptoms:Confusion   COMPARISON: CT of the brain dated 12/17/2020   ACCESSION NUMBER(S): [id number]   ORDERING CLINICIAN: KEVIN SHAH   TECHNIQUE: Axial non-contrast CT of the brain was performed. Sagittal and coronal 2D reformats were obtained.   FINDINGS: BRAIN PARENCHYMA:   No acute intracranial hemorrhage or infarction is evident.  There is a focus left occipital encephalomalacia. Is similar to the prior exam. There is incidental note of basal ganglia calcifications.   VENTRICLES AND EXTRA-AXIAL SPACES:   No hydrocephalus or midline shift is evident.  There is mild focal ex vacuo dilation of the posterior horn of the left lateral ventricle related to the occipital encephalomalacia.   INTRACRANIAL VESSELS:   Calcified atheromatous disease is seen of the visualized carotid and vertebrobasilar arterial trees.   SINUSES AND MASTOIDS:   Mucosal thickening is seen in the maxillary sinuses and multiple ethmoid air cells with complete opacification of some posterior right ethmoid air cells. There is an air-fluid level in the right sphenoid sinus. Mucoperiosteal thickening is seen in the maxillary sinuses right greater than left compatible with sequelae of chronic sinus disease. Mastoid air cells are clear.   OSSEOUS STRUCTURES:   No osseous injury is evident.   SOFT TISSUES:   Visualized associated soft tissues are grossly unremarkable.       1. No acute intracranial hemorrhage or infarction is evident. If there remains concern for acute cerebral insult, MRI is recommended. 2. Mucosal thickening in the sinuses with air-fluid level in the right sphenoid sinus as seen with acute sinusitis.   Signed by: Eugene Larsen 1/14/2024 4:34 PM Dictation workstation:   [id number]    XR chest 1 view    Result Date:  1/14/2024  STUDY: Chest Radiograph;  01/14/2024 at 2:39 PM INDICATION: Sepsis. Generalized weakness. COMPARISON: XR chest 11/20/23, 10/19/23, 08/27/23. ACCESSION NUMBER(S): [id number] ORDERING CLINICIAN: Jaziel Tyler TECHNIQUE:  Frontal chest was obtained at 1439 hours. FINDINGS: CARDIOMEDIASTINAL SILHOUETTE: Cardiomediastinal silhouette is top limits of normal for technique.  LUNGS: Lungs are clear.  ABDOMEN: No remarkable upper abdominal findings.  BONES: No acute osseous changes.    No evidence consolidating infiltrate or effusion. Signed by Can Lambert MD  .      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     MRI brain reviewed with Dr. Puentes.    Recommend:    Video EEG monitoring  Seizure precautions  ICU for every 2 hour neuro checks  Transfer to [facility] for rheumatology consult.   After loading dose of Keppra, start Keppra 500mg IV BID  If clinical seizure activity persists, start Depakote IV 500mg BID.   Ativan 1mg IV PRN seizure activity  Lumbar puncture with encephalopathy panel- see orders.     Case/plan discussed and pt seen with DR. Puentes.     I spent 55 minutes in the professional and overall care of this  patient.      Marleny Shi, APRN-CNP

## 2024-01-16 NOTE — CARE PLAN
Pt A&O x0 but able to follow commands. Unable to take meds by mouth. Pt cleaned, tolerated Q2 turns. Sacral dressing intact. Safety maintained, continue with plan of care

## 2024-01-17 ENCOUNTER — APPOINTMENT (OUTPATIENT)
Dept: RADIOLOGY | Facility: HOSPITAL | Age: 64
DRG: 101 | End: 2024-01-17
Payer: MEDICARE

## 2024-01-17 ENCOUNTER — APPOINTMENT (OUTPATIENT)
Dept: NEUROLOGY | Facility: HOSPITAL | Age: 64
DRG: 101 | End: 2024-01-17
Payer: MEDICARE

## 2024-01-17 ENCOUNTER — HOSPITAL ENCOUNTER (INPATIENT)
Facility: HOSPITAL | Age: 64
LOS: 11 days | Discharge: NURSING, REHAB OR LONG-TERM CARE | DRG: 101 | End: 2024-01-28
Attending: STUDENT IN AN ORGANIZED HEALTH CARE EDUCATION/TRAINING PROGRAM | Admitting: INTERNAL MEDICINE
Payer: MEDICARE

## 2024-01-17 ENCOUNTER — APPOINTMENT (OUTPATIENT)
Dept: INFUSION THERAPY | Facility: CLINIC | Age: 64
End: 2024-01-17
Payer: MEDICARE

## 2024-01-17 ENCOUNTER — APPOINTMENT (OUTPATIENT)
Dept: RADIOLOGY | Facility: HOSPITAL | Age: 64
DRG: 643 | End: 2024-01-17
Payer: MEDICARE

## 2024-01-17 VITALS
RESPIRATION RATE: 16 BRPM | WEIGHT: 211.64 LBS | HEART RATE: 54 BPM | OXYGEN SATURATION: 99 % | DIASTOLIC BLOOD PRESSURE: 68 MMHG | SYSTOLIC BLOOD PRESSURE: 146 MMHG | HEIGHT: 68 IN | TEMPERATURE: 97.5 F | BODY MASS INDEX: 32.08 KG/M2

## 2024-01-17 DIAGNOSIS — M32.14 LUPUS NEPHRITIS (MULTI): ICD-10-CM

## 2024-01-17 DIAGNOSIS — G03.9 MENINGITIS (HHS-HCC): ICD-10-CM

## 2024-01-17 DIAGNOSIS — R60.0 LOCALIZED EDEMA: ICD-10-CM

## 2024-01-17 DIAGNOSIS — M32.9 SYSTEMIC LUPUS ERYTHEMATOSUS, UNSPECIFIED SLE TYPE, UNSPECIFIED ORGAN INVOLVEMENT STATUS (MULTI): Chronic | ICD-10-CM

## 2024-01-17 DIAGNOSIS — I80.02 THROMBOPHLEBITIS OF SUPERFICIAL VEINS OF LEFT LOWER EXTREMITY: ICD-10-CM

## 2024-01-17 DIAGNOSIS — M32.9 LUPUS (MULTI): Primary | Chronic | ICD-10-CM

## 2024-01-17 PROBLEM — R56.9 SEIZURE-LIKE ACTIVITY (MULTI): Status: ACTIVE | Noted: 2024-01-17

## 2024-01-17 PROBLEM — I95.9 HYPOTENSION: Status: RESOLVED | Noted: 2024-01-15 | Resolved: 2024-01-17

## 2024-01-17 LAB
ABO GROUP (TYPE) IN BLOOD: NORMAL
ALBUMIN SERPL BCP-MCNC: 2.5 G/DL (ref 3.4–5)
ALP SERPL-CCNC: 82 U/L (ref 33–136)
ALP SERPL-CCNC: 95 U/L (ref 33–136)
ALT SERPL W P-5'-P-CCNC: 29 U/L (ref 7–45)
ALT SERPL W P-5'-P-CCNC: 30 U/L (ref 7–45)
ANION GAP SERPL CALC-SCNC: 11 MMOL/L (ref 10–20)
ANION GAP SERPL CALC-SCNC: 12 MMOL/L (ref 10–20)
ANION GAP SERPL CALC-SCNC: 13 MMOL/L (ref 10–20)
ANTIBODY SCREEN: NORMAL
APPEARANCE UR: CLEAR
APTT PPP: 37 SECONDS (ref 27–38)
AST SERPL W P-5'-P-CCNC: 24 U/L (ref 9–39)
AST SERPL W P-5'-P-CCNC: 25 U/L (ref 9–39)
BACTERIA #/AREA URNS AUTO: ABNORMAL /HPF
BASOPHILS # BLD MANUAL: 0 X10*3/UL (ref 0–0.1)
BASOPHILS # BLD MANUAL: 0 X10*3/UL (ref 0–0.1)
BASOPHILS NFR BLD MANUAL: 0 %
BASOPHILS NFR BLD MANUAL: 0 %
BILIRUB DIRECT SERPL-MCNC: 0 MG/DL (ref 0–0.3)
BILIRUB SERPL-MCNC: 0.2 MG/DL (ref 0–1.2)
BILIRUB SERPL-MCNC: 0.2 MG/DL (ref 0–1.2)
BILIRUB UR STRIP.AUTO-MCNC: NEGATIVE MG/DL
BUN SERPL-MCNC: 22 MG/DL (ref 6–23)
BUN SERPL-MCNC: 24 MG/DL (ref 6–23)
BUN SERPL-MCNC: 25 MG/DL (ref 6–23)
C DIF TOX TCDA+TCDB STL QL NAA+PROBE: DETECTED
C DIFF TOX A+B STL QL IA: NEGATIVE
C3 SERPL-MCNC: 108 MG/DL (ref 87–200)
C4 SERPL-MCNC: 54 MG/DL (ref 10–50)
CA-I BLD-SCNC: 1.21 MMOL/L (ref 1.1–1.33)
CALCIUM SERPL-MCNC: 7.7 MG/DL (ref 8.6–10.3)
CALCIUM SERPL-MCNC: 7.9 MG/DL (ref 8.6–10.6)
CALCIUM SERPL-MCNC: 8.2 MG/DL (ref 8.6–10.6)
CENTROMERE B AB SER-ACNC: <0.2 AI
CHLORIDE SERPL-SCNC: 112 MMOL/L (ref 98–107)
CHLORIDE SERPL-SCNC: 114 MMOL/L (ref 98–107)
CHLORIDE SERPL-SCNC: 114 MMOL/L (ref 98–107)
CHROMATIN AB SERPL-ACNC: 2.5 AI
CO2 SERPL-SCNC: 25 MMOL/L (ref 21–32)
CO2 SERPL-SCNC: 25 MMOL/L (ref 21–32)
CO2 SERPL-SCNC: 28 MMOL/L (ref 21–32)
COLOR UR: ABNORMAL
CREAT SERPL-MCNC: 1.39 MG/DL (ref 0.5–1.05)
CREAT SERPL-MCNC: 1.43 MG/DL (ref 0.5–1.05)
CREAT SERPL-MCNC: 1.48 MG/DL (ref 0.5–1.05)
DSDNA AB SER-ACNC: <1 IU/ML
DSDNA AB SER-ACNC: <1 IU/ML
EGFRCR SERPLBLD CKD-EPI 2021: 40 ML/MIN/1.73M*2
EGFRCR SERPLBLD CKD-EPI 2021: 42 ML/MIN/1.73M*2
EGFRCR SERPLBLD CKD-EPI 2021: 43 ML/MIN/1.73M*2
ENA JO1 AB SER QL IA: <0.2 AI
ENA RNP AB SER IA-ACNC: 0.5 AI
ENA SCL70 AB SER QL IA: <0.2 AI
ENA SM AB SER IA-ACNC: 0.3 AI
ENA SM+RNP AB SER QL IA: >8 AI
ENA SS-A AB SER IA-ACNC: 0.5 AI
ENA SS-B AB SER IA-ACNC: <0.2 AI
EOSINOPHIL # BLD MANUAL: 0 X10*3/UL (ref 0–0.7)
EOSINOPHIL # BLD MANUAL: 0 X10*3/UL (ref 0–0.7)
EOSINOPHIL NFR BLD MANUAL: 0 %
EOSINOPHIL NFR BLD MANUAL: 0 %
ERYTHROCYTE [DISTWIDTH] IN BLOOD BY AUTOMATED COUNT: 20.1 % (ref 11.5–14.5)
ERYTHROCYTE [DISTWIDTH] IN BLOOD BY AUTOMATED COUNT: 20.7 % (ref 11.5–14.5)
ERYTHROCYTE [DISTWIDTH] IN BLOOD BY AUTOMATED COUNT: 20.8 % (ref 11.5–14.5)
ERYTHROCYTE [SEDIMENTATION RATE] IN BLOOD BY WESTERGREN METHOD: 17 MM/H (ref 0–30)
FOLATE SERPL-MCNC: >24 NG/ML
GLUCOSE BLD MANUAL STRIP-MCNC: 126 MG/DL (ref 74–99)
GLUCOSE BLD MANUAL STRIP-MCNC: 145 MG/DL (ref 74–99)
GLUCOSE BLD MANUAL STRIP-MCNC: 244 MG/DL (ref 74–99)
GLUCOSE BLD MANUAL STRIP-MCNC: 93 MG/DL (ref 74–99)
GLUCOSE BLD MANUAL STRIP-MCNC: 99 MG/DL (ref 74–99)
GLUCOSE SERPL-MCNC: 114 MG/DL (ref 74–99)
GLUCOSE SERPL-MCNC: 141 MG/DL (ref 74–99)
GLUCOSE SERPL-MCNC: 255 MG/DL (ref 74–99)
GLUCOSE UR STRIP.AUTO-MCNC: NEGATIVE MG/DL
HCT VFR BLD AUTO: 21.8 % (ref 36–46)
HCT VFR BLD AUTO: 23.8 % (ref 36–46)
HCT VFR BLD AUTO: 28.6 % (ref 36–46)
HGB BLD-MCNC: 6.9 G/DL (ref 12–16)
HGB BLD-MCNC: 7 G/DL (ref 12–16)
HGB BLD-MCNC: 9.2 G/DL (ref 12–16)
IGG SERPL-MCNC: 474 MG/DL (ref 700–1600)
IMM GRANULOCYTES # BLD AUTO: 0.08 X10*3/UL (ref 0–0.7)
IMM GRANULOCYTES # BLD AUTO: 0.08 X10*3/UL (ref 0–0.7)
IMM GRANULOCYTES NFR BLD AUTO: 1.6 % (ref 0–0.9)
IMM GRANULOCYTES NFR BLD AUTO: 1.7 % (ref 0–0.9)
INR PPP: 0.9 (ref 0.9–1.1)
KETONES UR STRIP.AUTO-MCNC: NEGATIVE MG/DL
LACTATE SERPL-SCNC: 1.4 MMOL/L (ref 0.4–2)
LEUKOCYTE ESTERASE UR QL STRIP.AUTO: ABNORMAL
LYMPHOCYTES # BLD MANUAL: 0.2 X10*3/UL (ref 1.2–4.8)
LYMPHOCYTES # BLD MANUAL: 0.43 X10*3/UL (ref 1.2–4.8)
LYMPHOCYTES NFR BLD MANUAL: 4.4 %
LYMPHOCYTES NFR BLD MANUAL: 8.8 %
MAGNESIUM SERPL-MCNC: 1.89 MG/DL (ref 1.6–2.4)
MAGNESIUM SERPL-MCNC: 1.96 MG/DL (ref 1.6–2.4)
MAGNESIUM SERPL-MCNC: 2.11 MG/DL (ref 1.6–2.4)
MCH RBC QN AUTO: 29.2 PG (ref 26–34)
MCH RBC QN AUTO: 30.4 PG (ref 26–34)
MCH RBC QN AUTO: 30.5 PG (ref 26–34)
MCHC RBC AUTO-ENTMCNC: 29.4 G/DL (ref 32–36)
MCHC RBC AUTO-ENTMCNC: 31.7 G/DL (ref 32–36)
MCHC RBC AUTO-ENTMCNC: 32.2 G/DL (ref 32–36)
MCV RBC AUTO: 95 FL (ref 80–100)
MCV RBC AUTO: 96 FL (ref 80–100)
MCV RBC AUTO: 99 FL (ref 80–100)
MONOCYTES # BLD MANUAL: 0.04 X10*3/UL (ref 0.1–1)
MONOCYTES # BLD MANUAL: 0.16 X10*3/UL (ref 0.1–1)
MONOCYTES NFR BLD MANUAL: 0.9 %
MONOCYTES NFR BLD MANUAL: 3.5 %
MUCOUS THREADS #/AREA URNS AUTO: ABNORMAL /LPF
NEUTROPHILS # BLD MANUAL: 4.23 X10*3/UL (ref 1.2–7.7)
NEUTROPHILS # BLD MANUAL: 4.43 X10*3/UL (ref 1.2–7.7)
NEUTS BAND # BLD MANUAL: 0.24 X10*3/UL (ref 0–0.7)
NEUTS BAND # BLD MANUAL: 0.26 X10*3/UL (ref 0–0.7)
NEUTS BAND NFR BLD MANUAL: 5.3 %
NEUTS BAND NFR BLD MANUAL: 5.3 %
NEUTS SEG # BLD MANUAL: 3.99 X10*3/UL (ref 1.2–7)
NEUTS SEG # BLD MANUAL: 4.17 X10*3/UL (ref 1.2–7)
NEUTS SEG NFR BLD MANUAL: 85 %
NEUTS SEG NFR BLD MANUAL: 86.8 %
NITRITE UR QL STRIP.AUTO: NEGATIVE
NRBC BLD-RTO: 0.5 /100 WBCS (ref 0–0)
NRBC BLD-RTO: 0.6 /100 WBCS (ref 0–0)
NRBC BLD-RTO: 0.9 /100 WBCS (ref 0–0)
OVALOCYTES BLD QL SMEAR: ABNORMAL
PH UR STRIP.AUTO: 5 [PH]
PHOSPHATE SERPL-MCNC: 2.6 MG/DL (ref 2.5–4.9)
PHOSPHATE SERPL-MCNC: 2.8 MG/DL (ref 2.5–4.9)
PHOSPHATE SERPL-MCNC: 2.8 MG/DL (ref 2.5–4.9)
PLATELET # BLD AUTO: 74 X10*3/UL (ref 150–450)
PLATELET # BLD AUTO: 74 X10*3/UL (ref 150–450)
PLATELET # BLD AUTO: 82 X10*3/UL (ref 150–450)
POLYCHROMASIA BLD QL SMEAR: ABNORMAL
POTASSIUM SERPL-SCNC: 4 MMOL/L (ref 3.5–5.3)
PROLACTIN SERPL-MCNC: 42 UG/L (ref 3–20)
PROT SERPL-MCNC: 4.4 G/DL (ref 6.4–8.2)
PROT SERPL-MCNC: 4.6 G/DL (ref 6.4–8.2)
PROT UR STRIP.AUTO-MCNC: ABNORMAL MG/DL
PROTHROMBIN TIME: 10.4 SECONDS (ref 9.8–12.8)
RBC # BLD AUTO: 2.27 X10*6/UL (ref 4–5.2)
RBC # BLD AUTO: 2.4 X10*6/UL (ref 4–5.2)
RBC # BLD AUTO: 3.02 X10*6/UL (ref 4–5.2)
RBC # UR STRIP.AUTO: ABNORMAL /UL
RBC #/AREA URNS AUTO: ABNORMAL /HPF
RBC MORPH BLD: ABNORMAL
RBC MORPH BLD: ABNORMAL
RH FACTOR (ANTIGEN D): NORMAL
RIBOSOMAL P AB SER-ACNC: <0.2 AI
RIBOSOMAL P AB SER-ACNC: <0.2 AI
SODIUM SERPL-SCNC: 145 MMOL/L (ref 136–145)
SODIUM SERPL-SCNC: 148 MMOL/L (ref 136–145)
SODIUM SERPL-SCNC: 149 MMOL/L (ref 136–145)
SP GR UR STRIP.AUTO: 1.01
STOMATOCYTES BLD QL SMEAR: ABNORMAL
TARGETS BLD QL SMEAR: ABNORMAL
TOTAL CELLS COUNTED BLD: 113
TOTAL CELLS COUNTED BLD: 114
UFH PPP CHRO-ACNC: 0.2 IU/ML
UROBILINOGEN UR STRIP.AUTO-MCNC: <2 MG/DL
VIT B12 SERPL-MCNC: 1014 PG/ML (ref 211–911)
WBC # BLD AUTO: 3.9 X10*3/UL (ref 4.4–11.3)
WBC # BLD AUTO: 4.6 X10*3/UL (ref 4.4–11.3)
WBC # BLD AUTO: 4.9 X10*3/UL (ref 4.4–11.3)
WBC #/AREA URNS AUTO: ABNORMAL /HPF

## 2024-01-17 PROCEDURE — 95715 VEEG EA 12-26HR INTMT MNTR: CPT

## 2024-01-17 PROCEDURE — 2500000004 HC RX 250 GENERAL PHARMACY W/ HCPCS (ALT 636 FOR OP/ED)

## 2024-01-17 PROCEDURE — 80053 COMPREHEN METABOLIC PANEL: CPT | Performed by: INTERNAL MEDICINE

## 2024-01-17 PROCEDURE — 2500000002 HC RX 250 W HCPCS SELF ADMINISTERED DRUGS (ALT 637 FOR MEDICARE OP, ALT 636 FOR OP/ED)

## 2024-01-17 PROCEDURE — 74018 RADEX ABDOMEN 1 VIEW: CPT

## 2024-01-17 PROCEDURE — 36415 COLL VENOUS BLD VENIPUNCTURE: CPT

## 2024-01-17 PROCEDURE — 85027 COMPLETE CBC AUTOMATED: CPT | Performed by: INTERNAL MEDICINE

## 2024-01-17 PROCEDURE — 83735 ASSAY OF MAGNESIUM: CPT | Performed by: INTERNAL MEDICINE

## 2024-01-17 PROCEDURE — 37799 UNLISTED PX VASCULAR SURGERY: CPT | Performed by: INTERNAL MEDICINE

## 2024-01-17 PROCEDURE — 84100 ASSAY OF PHOSPHORUS: CPT | Performed by: INTERNAL MEDICINE

## 2024-01-17 PROCEDURE — 85007 BL SMEAR W/DIFF WBC COUNT: CPT | Mod: MUE

## 2024-01-17 PROCEDURE — 95700 EEG CONT REC W/VID EEG TECH: CPT | Mod: MUE

## 2024-01-17 PROCEDURE — 95714 VEEG EA 12-26 HR UNMNTR: CPT | Performed by: PSYCHIATRY & NEUROLOGY

## 2024-01-17 PROCEDURE — P9016 RBC LEUKOCYTES REDUCED: HCPCS

## 2024-01-17 PROCEDURE — 82248 BILIRUBIN DIRECT: CPT | Performed by: INTERNAL MEDICINE

## 2024-01-17 PROCEDURE — 2020000001 HC ICU ROOM DAILY

## 2024-01-17 PROCEDURE — 83735 ASSAY OF MAGNESIUM: CPT

## 2024-01-17 PROCEDURE — 2500000004 HC RX 250 GENERAL PHARMACY W/ HCPCS (ALT 636 FOR OP/ED): Performed by: STUDENT IN AN ORGANIZED HEALTH CARE EDUCATION/TRAINING PROGRAM

## 2024-01-17 PROCEDURE — 74018 RADEX ABDOMEN 1 VIEW: CPT | Performed by: RADIOLOGY

## 2024-01-17 PROCEDURE — 71045 X-RAY EXAM CHEST 1 VIEW: CPT

## 2024-01-17 PROCEDURE — 95714 VEEG EA 12-26 HR UNMNTR: CPT

## 2024-01-17 PROCEDURE — 95700 EEG CONT REC W/VID EEG TECH: CPT | Performed by: PSYCHIATRY & NEUROLOGY

## 2024-01-17 PROCEDURE — 81001 URINALYSIS AUTO W/SCOPE: CPT

## 2024-01-17 PROCEDURE — 80053 COMPREHEN METABOLIC PANEL: CPT

## 2024-01-17 PROCEDURE — 86901 BLOOD TYPING SEROLOGIC RH(D): CPT

## 2024-01-17 PROCEDURE — 85027 COMPLETE CBC AUTOMATED: CPT

## 2024-01-17 PROCEDURE — 85610 PROTHROMBIN TIME: CPT | Performed by: INTERNAL MEDICINE

## 2024-01-17 PROCEDURE — 82947 ASSAY GLUCOSE BLOOD QUANT: CPT

## 2024-01-17 PROCEDURE — 87493 C DIFF AMPLIFIED PROBE: CPT

## 2024-01-17 PROCEDURE — 85520 HEPARIN ASSAY: CPT

## 2024-01-17 PROCEDURE — 86920 COMPATIBILITY TEST SPIN: CPT

## 2024-01-17 PROCEDURE — 87324 CLOSTRIDIUM AG IA: CPT

## 2024-01-17 PROCEDURE — 99222 1ST HOSP IP/OBS MODERATE 55: CPT

## 2024-01-17 PROCEDURE — 86160 COMPLEMENT ANTIGEN: CPT

## 2024-01-17 PROCEDURE — 99223 1ST HOSP IP/OBS HIGH 75: CPT | Performed by: INTERNAL MEDICINE

## 2024-01-17 PROCEDURE — 80069 RENAL FUNCTION PANEL: CPT | Mod: CCI

## 2024-01-17 PROCEDURE — 85652 RBC SED RATE AUTOMATED: CPT

## 2024-01-17 PROCEDURE — 84100 ASSAY OF PHOSPHORUS: CPT

## 2024-01-17 PROCEDURE — 87086 URINE CULTURE/COLONY COUNT: CPT

## 2024-01-17 PROCEDURE — 2500000004 HC RX 250 GENERAL PHARMACY W/ HCPCS (ALT 636 FOR OP/ED): Performed by: INTERNAL MEDICINE

## 2024-01-17 PROCEDURE — 99223 1ST HOSP IP/OBS HIGH 75: CPT | Performed by: STUDENT IN AN ORGANIZED HEALTH CARE EDUCATION/TRAINING PROGRAM

## 2024-01-17 PROCEDURE — 83605 ASSAY OF LACTIC ACID: CPT | Performed by: INTERNAL MEDICINE

## 2024-01-17 PROCEDURE — 36430 TRANSFUSION BLD/BLD COMPNT: CPT

## 2024-01-17 PROCEDURE — 87040 BLOOD CULTURE FOR BACTERIA: CPT

## 2024-01-17 PROCEDURE — 85007 BL SMEAR W/DIFF WBC COUNT: CPT | Performed by: INTERNAL MEDICINE

## 2024-01-17 PROCEDURE — 00JU3ZZ INSPECTION OF SPINAL CANAL, PERCUTANEOUS APPROACH: ICD-10-PCS | Performed by: STUDENT IN AN ORGANIZED HEALTH CARE EDUCATION/TRAINING PROGRAM

## 2024-01-17 PROCEDURE — 2500000005 HC RX 250 GENERAL PHARMACY W/O HCPCS

## 2024-01-17 PROCEDURE — 82330 ASSAY OF CALCIUM: CPT | Performed by: INTERNAL MEDICINE

## 2024-01-17 PROCEDURE — 86235 NUCLEAR ANTIGEN ANTIBODY: CPT

## 2024-01-17 PROCEDURE — A4217 STERILE WATER/SALINE, 500 ML: HCPCS

## 2024-01-17 PROCEDURE — 95720 EEG PHY/QHP EA INCR W/VEEG: CPT | Performed by: PSYCHIATRY & NEUROLOGY

## 2024-01-17 PROCEDURE — 37799 UNLISTED PX VASCULAR SURGERY: CPT

## 2024-01-17 PROCEDURE — 84182 PROTEIN WESTERN BLOT TEST: CPT

## 2024-01-17 PROCEDURE — 99291 CRITICAL CARE FIRST HOUR: CPT

## 2024-01-17 PROCEDURE — 95700 EEG CONT REC W/VID EEG TECH: CPT

## 2024-01-17 PROCEDURE — 82784 ASSAY IGA/IGD/IGG/IGM EACH: CPT | Mod: STJLAB | Performed by: NURSE PRACTITIONER

## 2024-01-17 RX ORDER — CEFTRIAXONE 2 G/50ML
2 INJECTION, SOLUTION INTRAVENOUS EVERY 12 HOURS
Status: DISCONTINUED | OUTPATIENT
Start: 2024-01-17 | End: 2024-01-19

## 2024-01-17 RX ORDER — PANTOPRAZOLE SODIUM 40 MG/10ML
40 INJECTION, POWDER, LYOPHILIZED, FOR SOLUTION INTRAVENOUS
Status: DISCONTINUED | OUTPATIENT
Start: 2024-01-18 | End: 2024-01-28 | Stop reason: HOSPADM

## 2024-01-17 RX ORDER — ACETAMINOPHEN 325 MG/1
650 TABLET ORAL EVERY 6 HOURS PRN
Status: DISCONTINUED | OUTPATIENT
Start: 2024-01-17 | End: 2024-01-24

## 2024-01-17 RX ORDER — DEXTROSE MONOHYDRATE 100 MG/ML
0.3 INJECTION, SOLUTION INTRAVENOUS ONCE AS NEEDED
Status: CANCELLED | OUTPATIENT
Start: 2024-01-17

## 2024-01-17 RX ORDER — ATORVASTATIN CALCIUM 40 MG/1
40 TABLET, FILM COATED ORAL DAILY
Status: CANCELLED | OUTPATIENT
Start: 2024-01-17

## 2024-01-17 RX ORDER — LEVETIRACETAM 5 MG/ML
500 INJECTION INTRAVASCULAR EVERY 12 HOURS
Status: DISCONTINUED | OUTPATIENT
Start: 2024-01-17 | End: 2024-01-24

## 2024-01-17 RX ORDER — LEVETIRACETAM 5 MG/ML
500 INJECTION INTRAVASCULAR EVERY 12 HOURS
Status: CANCELLED | OUTPATIENT
Start: 2024-01-17

## 2024-01-17 RX ORDER — SODIUM CHLORIDE 0.9 % (FLUSH) 0.9 %
10 SYRINGE (ML) INJECTION AS NEEDED
Status: CANCELLED | OUTPATIENT
Start: 2024-01-17

## 2024-01-17 RX ORDER — DEXTROSE 50 % IN WATER (D50W) INTRAVENOUS SYRINGE
25
Status: CANCELLED | OUTPATIENT
Start: 2024-01-17

## 2024-01-17 RX ORDER — PANTOPRAZOLE SODIUM 40 MG/1
40 TABLET, DELAYED RELEASE ORAL
Status: DISCONTINUED | OUTPATIENT
Start: 2024-01-18 | End: 2024-01-28 | Stop reason: HOSPADM

## 2024-01-17 RX ORDER — PANTOPRAZOLE SODIUM 40 MG/10ML
40 INJECTION, POWDER, LYOPHILIZED, FOR SOLUTION INTRAVENOUS DAILY
Status: DISCONTINUED | OUTPATIENT
Start: 2024-01-17 | End: 2024-01-17 | Stop reason: HOSPADM

## 2024-01-17 RX ORDER — CEFTRIAXONE 2 G/50ML
2 INJECTION, SOLUTION INTRAVENOUS EVERY 12 HOURS
Status: CANCELLED | OUTPATIENT
Start: 2024-01-17

## 2024-01-17 RX ORDER — ENOXAPARIN SODIUM 100 MG/ML
40 INJECTION SUBCUTANEOUS EVERY 24 HOURS
Status: DISCONTINUED | OUTPATIENT
Start: 2024-01-17 | End: 2024-01-17

## 2024-01-17 RX ORDER — LORAZEPAM 2 MG/ML
2 INJECTION INTRAMUSCULAR ONCE
Status: DISCONTINUED | OUTPATIENT
Start: 2024-01-17 | End: 2024-01-17

## 2024-01-17 RX ORDER — LORAZEPAM 2 MG/ML
2 INJECTION INTRAMUSCULAR ONCE AS NEEDED
Status: DISCONTINUED | OUTPATIENT
Start: 2024-01-17 | End: 2024-01-28 | Stop reason: HOSPADM

## 2024-01-17 RX ORDER — PANTOPRAZOLE SODIUM 40 MG/10ML
40 INJECTION, POWDER, LYOPHILIZED, FOR SOLUTION INTRAVENOUS DAILY
Status: CANCELLED | OUTPATIENT
Start: 2024-01-17

## 2024-01-17 RX ORDER — ESOMEPRAZOLE MAGNESIUM 40 MG/1
40 GRANULE, DELAYED RELEASE ORAL
Status: DISCONTINUED | OUTPATIENT
Start: 2024-01-18 | End: 2024-01-28 | Stop reason: HOSPADM

## 2024-01-17 RX ORDER — ENOXAPARIN SODIUM 100 MG/ML
40 INJECTION SUBCUTANEOUS EVERY 24 HOURS
Status: CANCELLED | OUTPATIENT
Start: 2024-01-17

## 2024-01-17 RX ORDER — FOLIC ACID 1 MG/1
1 TABLET ORAL DAILY
Status: DISCONTINUED | OUTPATIENT
Start: 2024-01-17 | End: 2024-01-28 | Stop reason: HOSPADM

## 2024-01-17 RX ORDER — VANCOMYCIN HCL 50 MG/ML
125 SOLUTION, RECONSTITUTED, ORAL ORAL EVERY 6 HOURS SCHEDULED
Status: DISCONTINUED | OUTPATIENT
Start: 2024-01-17 | End: 2024-01-19

## 2024-01-17 RX ORDER — FOLIC ACID 1 MG/1
1 TABLET ORAL DAILY
Status: CANCELLED | OUTPATIENT
Start: 2024-01-17

## 2024-01-17 RX ORDER — SODIUM CHLORIDE 0.9 % (FLUSH) 0.9 %
10 SYRINGE (ML) INJECTION EVERY 12 HOURS
Status: CANCELLED | OUTPATIENT
Start: 2024-01-17

## 2024-01-17 RX ORDER — INSULIN LISPRO 100 [IU]/ML
0-10 INJECTION, SOLUTION INTRAVENOUS; SUBCUTANEOUS EVERY 4 HOURS
Status: DISCONTINUED | OUTPATIENT
Start: 2024-01-17 | End: 2024-01-17 | Stop reason: HOSPADM

## 2024-01-17 RX ORDER — INSULIN LISPRO 100 [IU]/ML
0-10 INJECTION, SOLUTION INTRAVENOUS; SUBCUTANEOUS EVERY 4 HOURS
Status: DISCONTINUED | OUTPATIENT
Start: 2024-01-17 | End: 2024-01-21

## 2024-01-17 RX ORDER — ACETAMINOPHEN 325 MG/1
650 TABLET ORAL EVERY 6 HOURS PRN
Status: CANCELLED | OUTPATIENT
Start: 2024-01-17

## 2024-01-17 RX ADMIN — AMPICILLIN SODIUM 2 G: 2 INJECTION, POWDER, FOR SOLUTION INTRAVENOUS at 14:16

## 2024-01-17 RX ADMIN — ACYCLOVIR SODIUM 650 MG: 50 INJECTION, SOLUTION INTRAVENOUS at 05:54

## 2024-01-17 RX ADMIN — CEFTRIAXONE SODIUM 2 G: 2 INJECTION, SOLUTION INTRAVENOUS at 21:16

## 2024-01-17 RX ADMIN — LEVETIRACETAM 500 MG: 5 INJECTION INTRAVENOUS at 11:13

## 2024-01-17 RX ADMIN — Medication 10 ML: at 00:29

## 2024-01-17 RX ADMIN — HYDROCORTISONE SODIUM SUCCINATE 75 MG: 100 INJECTION, POWDER, FOR SOLUTION INTRAMUSCULAR; INTRAVENOUS at 02:52

## 2024-01-17 RX ADMIN — HYDROCORTISONE SODIUM SUCCINATE 75 MG: 100 INJECTION, POWDER, FOR SOLUTION INTRAMUSCULAR; INTRAVENOUS at 12:03

## 2024-01-17 RX ADMIN — VANCOMYCIN HYDROCHLORIDE 1500 MG: 5 INJECTION, POWDER, LYOPHILIZED, FOR SOLUTION INTRAVENOUS at 20:30

## 2024-01-17 RX ADMIN — HYDROCORTISONE SODIUM SUCCINATE 25 MG: 100 INJECTION, POWDER, FOR SOLUTION INTRAMUSCULAR; INTRAVENOUS at 18:00

## 2024-01-17 RX ADMIN — AMPICILLIN SODIUM 2 G: 2 INJECTION, POWDER, FOR SOLUTION INTRAVENOUS at 18:00

## 2024-01-17 RX ADMIN — VANCOMYCIN HYDROCHLORIDE 125 MG: KIT at 21:15

## 2024-01-17 RX ADMIN — ACYCLOVIR SODIUM 650 MG: 50 INJECTION, SOLUTION INTRAVENOUS at 18:32

## 2024-01-17 RX ADMIN — AMPICILLIN SODIUM 2 G: 2 INJECTION, POWDER, FOR SOLUTION INTRAVENOUS at 23:03

## 2024-01-17 RX ADMIN — LEVETIRACETAM 500 MG: 5 INJECTION INTRAVENOUS at 23:03

## 2024-01-17 RX ADMIN — INSULIN LISPRO 4 UNITS: 100 INJECTION, SOLUTION INTRAVENOUS; SUBCUTANEOUS at 06:41

## 2024-01-17 RX ADMIN — AMPICILLIN SODIUM 2 G: 2 INJECTION, POWDER, FOR SOLUTION INTRAVENOUS at 04:28

## 2024-01-17 RX ADMIN — CEFTRIAXONE SODIUM 2 G: 2 INJECTION, SOLUTION INTRAVENOUS at 10:12

## 2024-01-17 RX ADMIN — AMPICILLIN SODIUM 2 G: 2 INJECTION, POWDER, FOR SOLUTION INTRAVENOUS at 11:13

## 2024-01-17 SDOH — SOCIAL STABILITY: SOCIAL INSECURITY: ABUSE: ADULT

## 2024-01-17 SDOH — ECONOMIC STABILITY: HOUSING INSECURITY
IN THE LAST 12 MONTHS, WAS THERE A TIME WHEN YOU DID NOT HAVE A STEADY PLACE TO SLEEP OR SLEPT IN A SHELTER (INCLUDING NOW)?: PATIENT UNABLE TO ANSWER

## 2024-01-17 SDOH — SOCIAL STABILITY: SOCIAL INSECURITY: WERE YOU ABLE TO COMPLETE ALL THE BEHAVIORAL HEALTH SCREENINGS?: NO

## 2024-01-17 SDOH — SOCIAL STABILITY: SOCIAL INSECURITY: DO YOU FEEL ANYONE HAS EXPLOITED OR TAKEN ADVANTAGE OF YOU FINANCIALLY OR OF YOUR PERSONAL PROPERTY?: UNABLE TO ASSESS

## 2024-01-17 SDOH — SOCIAL STABILITY: SOCIAL INSECURITY: DO YOU FEEL UNSAFE GOING BACK TO THE PLACE WHERE YOU ARE LIVING?: UNABLE TO ASSESS

## 2024-01-17 SDOH — ECONOMIC STABILITY: TRANSPORTATION INSECURITY
IN THE PAST 12 MONTHS, HAS LACK OF TRANSPORTATION KEPT YOU FROM MEETINGS, WORK, OR FROM GETTING THINGS NEEDED FOR DAILY LIVING?: PATIENT UNABLE TO ANSWER

## 2024-01-17 SDOH — SOCIAL STABILITY: SOCIAL INSECURITY: ARE THERE ANY APPARENT SIGNS OF INJURIES/BEHAVIORS THAT COULD BE RELATED TO ABUSE/NEGLECT?: UNABLE TO ASSESS

## 2024-01-17 SDOH — SOCIAL STABILITY: SOCIAL INSECURITY: ARE YOU OR HAVE YOU BEEN THREATENED OR ABUSED PHYSICALLY, EMOTIONALLY, OR SEXUALLY BY ANYONE?: UNABLE TO ASSESS

## 2024-01-17 SDOH — ECONOMIC STABILITY: HOUSING INSECURITY: IN THE LAST 12 MONTHS, HOW MANY PLACES HAVE YOU LIVED?: 1

## 2024-01-17 SDOH — ECONOMIC STABILITY: INCOME INSECURITY
HOW HARD IS IT FOR YOU TO PAY FOR THE VERY BASICS LIKE FOOD, HOUSING, MEDICAL CARE, AND HEATING?: PATIENT UNABLE TO ANSWER

## 2024-01-17 SDOH — SOCIAL STABILITY: SOCIAL INSECURITY: HAVE YOU HAD THOUGHTS OF HARMING ANYONE ELSE?: UNABLE TO ASSESS

## 2024-01-17 SDOH — ECONOMIC STABILITY: TRANSPORTATION INSECURITY
IN THE PAST 12 MONTHS, HAS THE LACK OF TRANSPORTATION KEPT YOU FROM MEDICAL APPOINTMENTS OR FROM GETTING MEDICATIONS?: PATIENT UNABLE TO ANSWER

## 2024-01-17 SDOH — SOCIAL STABILITY: SOCIAL INSECURITY: HAS ANYONE EVER THREATENED TO HURT YOUR FAMILY OR YOUR PETS?: UNABLE TO ASSESS

## 2024-01-17 SDOH — ECONOMIC STABILITY: INCOME INSECURITY
IN THE LAST 12 MONTHS, WAS THERE A TIME WHEN YOU WERE NOT ABLE TO PAY THE MORTGAGE OR RENT ON TIME?: PATIENT UNABLE TO ANSWER

## 2024-01-17 SDOH — SOCIAL STABILITY: SOCIAL INSECURITY: DOES ANYONE TRY TO KEEP YOU FROM HAVING/CONTACTING OTHER FRIENDS OR DOING THINGS OUTSIDE YOUR HOME?: UNABLE TO ASSESS

## 2024-01-17 ASSESSMENT — COGNITIVE AND FUNCTIONAL STATUS - GENERAL
PERSONAL GROOMING: A LOT
MOVING TO AND FROM BED TO CHAIR: A LOT
HELP NEEDED FOR BATHING: A LOT
PATIENT BASELINE BEDBOUND: YES
TURNING FROM BACK TO SIDE WHILE IN FLAT BAD: A LOT
STANDING UP FROM CHAIR USING ARMS: A LOT
DAILY ACTIVITIY SCORE: 12
MOBILITY SCORE: 12
TOILETING: A LOT
MOVING FROM LYING ON BACK TO SITTING ON SIDE OF FLAT BED WITH BEDRAILS: A LOT
CLIMB 3 TO 5 STEPS WITH RAILING: A LOT
DRESSING REGULAR LOWER BODY CLOTHING: A LOT
DRESSING REGULAR UPPER BODY CLOTHING: A LOT
EATING MEALS: A LOT
WALKING IN HOSPITAL ROOM: A LOT

## 2024-01-17 ASSESSMENT — PAIN SCALES - GENERAL
PAINLEVEL_OUTOF10: 0 - NO PAIN

## 2024-01-17 ASSESSMENT — ACTIVITIES OF DAILY LIVING (ADL)
JUDGMENT_ADEQUATE_SAFELY_COMPLETE_DAILY_ACTIVITIES: YES
FEEDING YOURSELF: DEPENDENT
LACK_OF_TRANSPORTATION: PATIENT UNABLE TO ANSWER
ADEQUATE_TO_COMPLETE_ADL: YES
GROOMING: DEPENDENT
DRESSING YOURSELF: DEPENDENT
PATIENT'S MEMORY ADEQUATE TO SAFELY COMPLETE DAILY ACTIVITIES?: YES
WALKS IN HOME: NEEDS ASSISTANCE
HEARING - LEFT EAR: FUNCTIONAL
BATHING: DEPENDENT
HEARING - RIGHT EAR: FUNCTIONAL
TOILETING: DEPENDENT

## 2024-01-17 ASSESSMENT — LIFESTYLE VARIABLES
AUDIT-C TOTAL SCORE: -1
HOW OFTEN DO YOU HAVE 6 OR MORE DRINKS ON ONE OCCASION: PATIENT UNABLE TO ANSWER
HOW MANY STANDARD DRINKS CONTAINING ALCOHOL DO YOU HAVE ON A TYPICAL DAY: PATIENT UNABLE TO ANSWER
SKIP TO QUESTIONS 9-10: 0
AUDIT-C TOTAL SCORE: -1
HOW OFTEN DO YOU HAVE A DRINK CONTAINING ALCOHOL: PATIENT UNABLE TO ANSWER

## 2024-01-17 ASSESSMENT — PAIN - FUNCTIONAL ASSESSMENT
PAIN_FUNCTIONAL_ASSESSMENT: 0-10
PAIN_FUNCTIONAL_ASSESSMENT: 0-10
PAIN_FUNCTIONAL_ASSESSMENT: CPOT (CRITICAL CARE PAIN OBSERVATION TOOL)
PAIN_FUNCTIONAL_ASSESSMENT: 0-10
PAIN_FUNCTIONAL_ASSESSMENT: CPOT (CRITICAL CARE PAIN OBSERVATION TOOL)
PAIN_FUNCTIONAL_ASSESSMENT: CPOT (CRITICAL CARE PAIN OBSERVATION TOOL)

## 2024-01-17 ASSESSMENT — COLUMBIA-SUICIDE SEVERITY RATING SCALE - C-SSRS
6. HAVE YOU EVER DONE ANYTHING, STARTED TO DO ANYTHING, OR PREPARED TO DO ANYTHING TO END YOUR LIFE?: NO
2. HAVE YOU ACTUALLY HAD ANY THOUGHTS OF KILLING YOURSELF?: NO
1. IN THE PAST MONTH, HAVE YOU WISHED YOU WERE DEAD OR WISHED YOU COULD GO TO SLEEP AND NOT WAKE UP?: NO

## 2024-01-17 NOTE — H&P
Subjective     Chief concern: No chief complaint on file.      History of present illness:  Bing Holliday is a 63 y.o. 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   initially c/f adrenal insufficiency, but now transferred for inpatient rheum consult iso c/f lupus cerebritis vs meningitis vs adrenal insufficiency as etiology for encephalopathy.    Per Discharge Summary for Hospitalization 11/20-11/27:  Presented to the [facility] ED on 11/21 with worsening generalized weakness, confusion, and hypoglycemia and admitted for Adrenal crisis secondary to Norovirus. Patient presented to ED confused, but HDS w/ BP of 101/68 and glucose of 58. Had been complaining of diarrhea multiple times per hour, found to be norovirus positive, C. diff negative. UA wo esterases/WBCs, tx w/ 1d CTX, d/riccardo d/t lack of evidence for infxn. Diarrhea gradually resolved. Endo c/s and d/c patient's fludrocortisone as patient did not have primary AI. Also had 2 episodes of asymptomatic hypoglycemia tx w/ glucagon, but no further episodes after endocrinology adjusted her prednisone regimen (changed to 10mg QAM upon waking, 5mg @2pm daily). Endocrinologist suspected patient may be a hyper-metabolizer of prednisone, or malnourished w/ depleted glycogen stores. Discharged w/ instructions to f/u w PCP and endocrinologist in [place] (Dr. Chi).    Per Discharge Summary for Hospitalization 12/17-12/23:  Presented to [facility] ED on 12/17 for Glucose 68, incidentally found to be hyperkalemic, so admitted for further workup and monitoring. HyperK resolved w/ Kayexalate/calcium gluconate. Found to have UTI w/ mixed antonio on UCx, but prior urine cx had Serratia as predominant species w/ susceptibility to CTX, so she was tx w/ CTX. Initially encephalopathic  2/2 to UTI, improved w/ tx. There may have also been a significant component of AI, as increase in prednisone dose also improved symptoms of mental status dysfunction, but mental status was slow to recover overall. Of note, patient also had episode of epistaxis over R nare that required packing and ENT consultation. Temporarily d/riccardo Eliquis x7d at recommendation of ENT w/ plan to f/u o/p w/ ENT for removal of nasal packing. Discharge summary written 12/21, but precert approved 12/23, so discharged to SNF on that date. ENT evaluated o/p and rec'd restarting Eliquis on 1/7/24.     OSH Course for 1/14-1/17:  Presented from SNF to [facility] ED on 1/14/2024 for complaints of confusion and weakness with upper and low back pain. In the nursing home, she had 2d hx of weakness, worsening confusion, hallucinations (speaking to people who were not there), inability to ambulate by self, reports of chills. At the time of admission, denied sick contacts, missed medication doses, but had taken an extra dose of home prednisone because her rheumatologist told her to do that to prevent episodes of worsening SLE symptoms.    ED Course was notable for tachycardia (114), hypoTN (BP 84/69), otherwise vitals wnl. Labs notable for mild hypoglycemia (66), mild HyperNa (148), BUN/Cr of 51/2.24, bsl Hgb 8.2, plts 101, trop negative, lactate 3.4, UA wo WBCs. Imaging notable for CTH wo acute ICH, CXR wo acute cardiopulm process, CT A/P w/ cardiomegaly w/ CAD, colonic diverticulosis wo acute diverticulitis, non-obstructing R sided nephrolithiasis. Initial EKG w/ AFib w/ RVR at 105. Interventions include D50 x25g, Vanc/Cefepime/Flagyl, 2L LR, 2g MgSulfate, 100mg Methylpred.     Admitted to hospital for AMS, metabolic disturbances c/f adrenal insufficiency d/t patient being on chronic steroids and possible medication non-adherence iso worsening confusion. Note: she was DNR/DNI on admission. During hospital stay, patient was noted to vacillate between  A&Ox0 to A&Ox2 (self and location) though status waxed/waned throughout day. Patient reported diffuse abd pain and b/l arm/leg pain to superficial palpation. B/l DVT U/S negative for DVT. Noted to have chronic sacral wound, pink, clean, intact, wo signs of infection. 1/15 MRI Brain w and w/o contrast demonstrated focal encephalomalacia in L occipital region unchanged from previous exam. Hemosiderin deposit in L occipital region consistent w/ remote infarction/contusion. Scattered foci w/ increased signal in subcortical and periventricular white matter consistent w/ demyelination (new finding).     On 1/16 PM, patient had clinically deteriorated, AOx0, no longer nodding/shaking head to questions, diffusely sensitive to light palpation over whole body. Neuro c/s on 1/16 for continuous facial twitching while awake w/ rapid eye blinking, lip smacking, which was refractory to Ativan, who rec'd vEEG, keppra load and maintenance, Ativan, lumbar puncture, and transfer to ICU for closer monitoring. Continuous EEG demonstrated status epilepticus for which patient as tx w/ Keppra/Ativan. Neuro rec'd Neuro ICU for continuous vEEG and real time titration of medications/therapy. Transfer center call convened, and neuro ICU attending did not feel patient required neuro ICU; MICU attending was requested instead, accepted to MICU pending bed availability.     Patient Cellcept held on 1/16, attempted to reach out to Rheum physician (Dr. Castellanos) for recommendations. LP ordered to r/o encephalitis, but unable to attempt d/t Eliquis dose on 1/15/24 PM. Eliquis since held w/ plan to attempt LP on 11/17. Despite worsening mentation, patient was not intubated as she is DNI. However, patient was empirically started on empiric meningitis/encephalitis coverage is immunosuppression: Ampicillin, CTX, Vancomycin, Acyclovir.    Continuing to have diarrheal output, so C. diff PCR ordered. Primary team initially ordered IR PICC and PPN d/t poor  intake and volume dehydration, but d/t immunocompromised status, determined to prefer CorPak w/ enteral nutrition. Was also started on stress dose hydrocortisone (75mg Q6H) after 100mg dose on 1/14. Patient required 1 uPRBCs transfusin on 1/16 d/t Hgb of 6.5 (down from initial presentation, c/f hemodilution iso multiple L of fluid for free water deficit evidenced by HyperNatremia).         Cardiac Hx:  Last echo: No results found for this or any previous visit.   Last cath: none  Last EKG:   Encounter Date: 01/14/24   ECG 12 lead   Result Value    Ventricular Rate 86    Atrial Rate 117    QRS Duration 94    QT Interval 400    QTC Calculation(Bazett) 478    R Axis 3    T Axis -65    QRS Count 14    Q Onset 216    T Offset 416    QTC Fredericia 451    Narrative    Atrial fibrillation  Moderate voltage criteria for LVH, may be normal variant  ST & T wave abnormality, consider inferolateral ischemia or digitalis effect  Prolonged QT  Abnormal ECG  When compared with ECG of 14-JAN-2024 14:13, (unconfirmed)  ST elevation now present in Lateral leads  Confirmed by Mario Alberto Hopson ([id number]) on 1/16/2024 3:40:59 PM        GI Hx:  Last EGD: No results found for this or any previous visit.  Last Colonoscopy:  === Results for orders placed in visit on 11/13/18 ===    COLONOSCOPY [GI6]     Status: Normal    Past Medical History:  She has a past medical history of Acute upper respiratory infection, unspecified (03/04/2020), Acute upper respiratory infection, unspecified (09/30/2015), Arthritis, Body mass index (BMI) 23.0-23.9, adult (10/15/2021), Body mass index (BMI) 33.0-33.9, adult (03/04/2020), Cardiomegaly (08/27/2013), Chronic kidney disease, stage 3 unspecified (CMS/HCC) (07/02/2013), Disease of pericardium, unspecified (07/02/2013), Encounter for follow-up examination after completed treatment for conditions other than malignant neoplasm (10/06/2022), Generalized contraction of visual field, right eye (01/29/2015), Homonymous  bilateral field defects, right side (04/29/2016), Hypertensive chronic kidney disease with stage 1 through stage 4 chronic kidney disease, or unspecified chronic kidney disease (07/02/2013), Laceration without foreign body, left foot, initial encounter (07/03/2018), Migraine with aura, not intractable, without status migrainosus (10/24/2022), Other conditions influencing health status (07/02/2013), Other conditions influencing health status (07/02/2013), Other conditions influencing health status (07/02/2013), Other conditions influencing health status (05/22/2015), Other conditions influencing health status (10/24/2022), Other conditions influencing health status (03/14/2022), Other long term (current) drug therapy (10/24/2022), Personal history of diseases of the blood and blood-forming organs and certain disorders involving the immune mechanism (07/02/2013), Personal history of diseases of the skin and subcutaneous tissue (08/11/2015), Personal history of nephrotic syndrome (07/02/2013), Personal history of other diseases of the circulatory system (08/27/2013), Personal history of other diseases of the nervous system and sense organs, Personal history of other diseases of the respiratory system, Personal history of other infectious and parasitic diseases (07/02/2013), Personal history of other specified conditions, Personal history of other specified conditions (08/27/2013), Puckering of macula, right eye (10/24/2022), Raynaud's syndrome without gangrene (07/02/2013), Systemic lupus erythematosus, unspecified (CMS/[facility]) (07/24/2015), Systemic lupus erythematosus, unspecified (CMS/[facility]) (07/24/2015), Systemic lupus erythematosus, unspecified (CMS/[facility]) (07/24/2015), Type 2 diabetes mellitus with diabetic nephropathy (CMS/[facility]) (07/02/2013), Type 2 diabetes mellitus with mild nonproliferative diabetic retinopathy without macular edema, left eye (CMS/[facility]) (07/27/2015), Type 2 diabetes mellitus with mild nonproliferative  diabetic retinopathy without macular edema, unspecified eye (CMS/[facility]) (07/24/2015), Type 2 diabetes mellitus with proliferative diabetic retinopathy without macular edema, right eye (CMS/[facility]) (07/27/2015), Type 2 diabetes mellitus with proliferative diabetic retinopathy without macular edema, unspecified eye (CMS/[facility]) (07/24/2015), Unspecified acute and subacute iridocyclitis (07/24/2015), and Unspecified open wound, left foot, sequela (07/03/2018).    Past Surgical History:  She has a past surgical history that includes Eye surgery (03/06/2015); Total hip arthroplasty (01/29/2015); Cholecystectomy (01/29/2015); Other surgical history (01/29/2015); Other surgical history (01/29/2015); Ankle surgery (01/29/2015); Foot surgery (01/29/2015); MR angio head wo IV contrast (7/26/2013); MR angio neck wo IV contrast (7/26/2013); CT guided percutaneous biopsy bone deep (5/4/2021); MR angio head wo IV contrast (9/17/2021); MR angio neck wo IV contrast (9/17/2021); MR angio neck wo IV contrast (3/25/2023); and MR angio head wo IV contrast (3/25/2023).     Social history:  Living Situation: ?  Occupation: ?  Alcohol:   Alcohol Use: Not At Risk (1/15/2024)    AUDIT-C     Frequency of Alcohol Consumption: Never     Average Number of Drinks: Patient does not drink     Frequency of Binge Drinking: Never     Tobacco:   Tobacco Use: Low Risk  (12/26/2023)    Patient History     Smoking Tobacco Use: Never     Smokeless Tobacco Use: Never     Passive Exposure: Not on file      Illicit Drugs:   Social History     Substance and Sexual Activity   Drug Use Never       She reports that she has never smoked. She has never used smokeless tobacco. She reports that she does not drink alcohol and does not use drugs.    Family history:  Family History   Problem Relation Name Age of Onset    Other (RENAL DISEASE) Mother          END STAGE    Other (CARDIAC DISORDER) Mother      Cataracts Mother      Stroke Mother      Diabetes Mother      Kidney  disease Mother      Lupus Mother      Other (CARDIAC DISORDER) Father      COPD Father      Glaucoma Father      Hypertension Father      Sleep apnea Father      Other (CARDIAC DISORDER) Sister      Depression Sister      Kidney disease Sister      Sickle cell trait Sister      Sleep apnea Daughter          Allergies:  Ace inhibitors, Hydroxychloroquine, Lisinopril, Penicillins, and Sulfa (sulfonamide antibiotics)    Home medications:  Prior to Admission medications    Medication Sig Start Date End Date Taking? Authorizing Provider   acetaminophen (Tylenol) 325 mg tablet Take 2 tablets (650 mg) by mouth every 4 hours if needed for mild pain (1 - 3).    Historical Provider, MD   amLODIPine (Norvasc) 2.5 mg tablet Take 1 tablet (2.5 mg) by mouth once daily. 2/7/22   Historical Provider, MD   atorvastatin (Lipitor) 40 mg tablet Take 1 tablet (40 mg) by mouth once daily. 10/11/23   Claudio Hood DO   belimumab (Benlysta) 400 mg recon soln IV injection Infuse 10 mg/kg into a venous catheter every 28 (twenty-eight) days.  (900mg per dose)    Historical Provider, MD   blood-glucose sensor (Dexcom G6 Sensor) device Use to check sugars 3 times daily 7/31/23   Claudio Hood DO   Ca carb-D3-mag [id number] 300 mg-20 mcg- 25 mg-0.5 mg tablet Take 1 tablet by mouth 2 times a day.    Historical Provider, MD   Dexcom G4 platinum  (Dexcom G6 ) misc Use as instructed 7/31/23   Claudio Hood DO   Dexcom G4 platinum transmitter (Dexcom G6 Transmitter) device Use as instructed 7/31/23   Claudio Hood DO   fluticasone-umeclidin-vilanter (TRELEGY-ELLIPTA) 200-62.5-25 mcg blister with device Inhale 1 puff once daily. 11/1/23   Claudio Hood DO   folic acid (Folvite) 1 mg tablet TAKE 1 TABLET BY MOUTH EVERY DAY 1/3/24   Claudio Hood DO   magnesium oxide (Mag-Ox) 400 mg tablet Take 1 tablet (400 mg) by mouth once daily.    Historical Provider, MD   melatonin 5 mg tablet Take 1 tablet (5 mg) by mouth  once daily at bedtime.    Historical Provider, MD   multivitamin tablet Take 1 tablet by mouth once daily. 5/26/22   Historical Provider, MD   mycophenolate (Cellcept) 500 mg tablet Take 2 tablets (1,000 mg) by mouth twice a day. 7/29/21   Historical Provider, MD   pantoprazole (ProtoNix) 40 mg EC tablet TAKE 1 TABLET BY MOUTH EVERY DAY 10/23/23   Claudio Hood DO   predniSONE (Deltasone) 10 mg tablet Take 1 tablet (10 mg) by mouth once daily in the morning. Take first thing when you wake up every morning. 11/27/23   Lauri Silverio DO   predniSONE (Deltasone) 5 mg tablet Take 1 tablet (5 mg) by mouth see administration instructions. Take one tablet everyday at 2PM scheduled 11/27/23   Lauri Silverio DO   risperiDONE (RisperDAL) 0.5 mg tablet TAKE 1 TABLET BY MOUTH AT BEDTIME  Patient not taking: Reported on 1/15/2024 7/26/23 7/25/24  Chaka Figueroa DO   torsemide (Demadex) 10 mg tablet TAKE 1 TABLET BY MOUTH EVERY DAY  Patient not taking: Reported on 1/15/2024 12/13/23   Claudio Hood DO          Objective     Review of systems  Review of Systems   Unable to perform ROS: Mental status change        Vital signs:  There were no vitals taken for this visit.    Physical Exam  Constitutional:       Appearance: She is ill-appearing.   HENT:      Head: Normocephalic.   Cardiovascular:      Rate and Rhythm: Regular rhythm. Bradycardia present.      Pulses: Normal pulses.      Heart sounds: Normal heart sounds.   Pulmonary:      Effort: Pulmonary effort is normal.      Breath sounds: Normal breath sounds.   Abdominal:      General: Abdomen is flat.      Palpations: Abdomen is soft.   Musculoskeletal:         General: No swelling.   Skin:     General: Skin is warm.      Capillary Refill: Capillary refill takes less than 2 seconds.      Coloration: Skin is not jaundiced.   Neurological:      Mental Status: She is disoriented.      Comments: Oscillates between aaox3 and aaox0.         Relevant  Results        Meds:  Current Facility-Administered Medications on File Prior to Encounter   Medication Dose Route Frequency Provider Last Rate Last Admin    acetaminophen (Tylenol) tablet 650 mg  650 mg oral q6h PRN Yassine Win, DO   650 mg at 01/15/24 1330    acyclovir (Zovirax) 650 mg in dextrose 5 % in water (D5W) 100 mL IV  10 mg/kg (Ideal) intravenous q12h López Pyle,  mL/hr at 01/17/24 0554 650 mg at 01/17/24 0554    alteplase (Cathflo Activase) injection 2 mg  2 mg intra-catheter PRN Yasisne Win, DO        ampicillin (Omnipen) in sodium chloride 0.9 % 100 mL IV 2 g  2 g intravenous q4h López Pyle, DO   Stopped at 01/17/24 0458    [Held by provider] atorvastatin (Lipitor) tablet 40 mg  40 mg oral Daily Yassine Win, DO   40 mg at 01/15/24 0943    cefTRIAXone (Rocephin) 2 g IV in dextrose 5% 50 mL  2 g intravenous q12h López Pyle, DO   Stopped at 01/16/24 2136    dextrose 10 % in water (D10W) infusion  0.3 g/kg/hr intravenous Once PRN Yassine Win DO        [COMPLETED] dextrose 5 % in water (D5W) bolus  1,000 mL intravenous Once López Pyle, DO   Stopped at 01/16/24 1729    dextrose 50 % injection 25 g  25 g intravenous q15 min PRN Yassine Win DO   25 g at 01/16/24 0910    enoxaparin (Lovenox) syringe 40 mg  40 mg subcutaneous q24h Yassine Win, DO   40 mg at 01/16/24 1629    [COMPLETED] fentaNYL PF (Sublimaze) injection 50 mcg  50 mcg intravenous Once López Pyle DO   50 mcg at 01/16/24 1556    [Held by provider] folic acid (Folvite) tablet 1 mg  1 mg oral Daily Yassine Win, DO   1 mg at 01/15/24 0943    glucagon (Glucagen) injection 1 mg  1 mg intramuscular q15 min PRN Yassine Win, DO        hydrocortisone sod succ (PF) (Solu-CORTEF) injection 75 mg  75 mg intravenous q6h Marilee Jean DO   75 mg at 01/17/24 0252    [COMPLETED] HYDROmorphone (Dilaudid) injection 0.3 mg  0.3 mg intravenous Once Aishwarya Dee MD   0.3 mg at 01/16/24 1204    insulin lispro (HumaLOG) injection 0-10 Units  0-10  Units subcutaneous q4h ASIM Moreno   4 Units at 01/17/24 0641    [COMPLETED] levETIRAcetam in NaCl (iso-os) (Keppra) IV 1,000 mg  1,000 mg intravenous Once ASIM Rivas   1,000 mg at 01/16/24 1258    levETIRAcetam in NaCl (iso-os) (Keppra)  mg  500 mg intravenous q12h ASIM Rivas   500 mg at 01/16/24 2306    [COMPLETED] levETIRAcetam in NaCl (iso-os) (Keppra)  mg  500 mg intravenous Once ASIM Rivas   500 mg at 01/16/24 1403    lidocaine (Xylocaine) 10 mg/mL (1 %) injection 50 mg  5 mL infiltration Once Aishwarya Dee MD        [COMPLETED] LORazepam (Ativan) injection 1 mg  1 mg intravenous Once ASIM Rivas   1 mg at 01/16/24 1101    [COMPLETED] LORazepam (Ativan) injection 1 mg  1 mg intravenous Once ASIM Rivas   1 mg at 01/16/24 1333    [COMPLETED] magnesium sulfate IV 2 g  2 g intravenous Once Yassine Win DO   Stopped at 01/16/24 1024    [Held by provider] mycophenolate (Cellcept) capsule 1,000 mg  1,000 mg oral BID Yassine Win DO   1,000 mg at 01/15/24 1036    pantoprazole (ProtoNix) injection 40 mg  40 mg intravenous Daily ASIM Moreno        [COMPLETED] potassium phosphates in dextrose 5% 250 mL IV 15 mmol  15 mmol intravenous Once ASMI Moreno   15 mmol at 01/16/24 2228    sodium chloride 0.9% flush 10 mL  10 mL intra-catheter q12h Aishwarya Dee MD   10 mL at 01/17/24 0029    sodium chloride 0.9% flush 10 mL  10 mL intra-catheter PRN Aishwarya Dee MD        vancomycin (Vancocin) in  mL IV 1,250 mg  1,250 mg intravenous q24h López Pyle DO        [COMPLETED] vancomycin in sodium chloride 0.9 % (Vancocin) IVPB 2,000 mg  2,000 mg intravenous Once López Pyle DO   Stopped at 01/16/24 2127    [DISCONTINUED] Adult Clinimix Parenteral Nutrition Continuous  70 mL/hr intravenous Continuous Yassine Win DO        [DISCONTINUED] apixaban (Eliquis) tablet 2.5 mg   2.5 mg oral BID Yassine Win DO   2.5 mg at 01/15/24 1330    [DISCONTINUED] dextrose 5 % in water (D5W) bolus  1,000 mL intravenous q6h López Pyle DO   Stopped at 01/16/24 1304    [DISCONTINUED] levETIRAcetam in NaCl (iso-os) (Keppra)  mg  500 mg intravenous Once Marleny Shi, APRN-CNP        [DISCONTINUED] polyethylene glycol (Glycolax, Miralax) packet 17 g  17 g oral Daily Eugenia Yu DO        [DISCONTINUED] sodium chloride 0.9% flush 10 mL  10 mL intra-catheter q12h Yassine Win DO        [DISCONTINUED] sodium chloride 0.9% flush 10 mL  10 mL intra-catheter PRN Yassine Win DO        [DISCONTINUED] vancomycin (Vancocin) 2,000 mg in dextrose 5 % in water (D5W) 250 mL IV  2,000 mg intravenous Once López Pyle DO         Current Outpatient Medications on File Prior to Encounter   Medication Sig Dispense Refill    acetaminophen (Tylenol) 325 mg tablet Take 2 tablets (650 mg) by mouth every 4 hours if needed for mild pain (1 - 3).      amLODIPine (Norvasc) 2.5 mg tablet Take 1 tablet (2.5 mg) by mouth once daily.      atorvastatin (Lipitor) 40 mg tablet Take 1 tablet (40 mg) by mouth once daily. 90 tablet 3    belimumab (Benlysta) 400 mg recon soln IV injection Infuse 10 mg/kg into a venous catheter every 28 (twenty-eight) days.  (900mg per dose)      blood-glucose sensor (Dexcom G6 Sensor) device Use to check sugars 3 times daily 4 each 2    Ca carb-D3-mag [id number]-bkuu-Zn 300 mg-20 mcg- 25 mg-0.5 mg tablet Take 1 tablet by mouth 2 times a day.      Dexcom G4 platinum  (Dexcom G6 ) misc Use as instructed 1 each 0    Dexcom G4 platinum transmitter (Dexcom G6 Transmitter) device Use as instructed 1 each 0    fluticasone-umeclidin-vilanter (TRELEGY-ELLIPTA) 200-62.5-25 mcg blister with device Inhale 1 puff once daily. 60 each 5    folic acid (Folvite) 1 mg tablet TAKE 1 TABLET BY MOUTH EVERY DAY 90 tablet 1    magnesium oxide (Mag-Ox) 400 mg tablet Take 1 tablet (400 mg) by mouth  once daily.      melatonin 5 mg tablet Take 1 tablet (5 mg) by mouth once daily at bedtime.      multivitamin tablet Take 1 tablet by mouth once daily.      mycophenolate (Cellcept) 500 mg tablet Take 2 tablets (1,000 mg) by mouth twice a day.      pantoprazole (ProtoNix) 40 mg EC tablet TAKE 1 TABLET BY MOUTH EVERY DAY 90 tablet 1    predniSONE (Deltasone) 10 mg tablet Take 1 tablet (10 mg) by mouth once daily in the morning. Take first thing when you wake up every morning. 30 tablet 2    predniSONE (Deltasone) 5 mg tablet Take 1 tablet (5 mg) by mouth see administration instructions. Take one tablet everyday at 2PM scheduled 30 tablet 2    risperiDONE (RisperDAL) 0.5 mg tablet TAKE 1 TABLET BY MOUTH AT BEDTIME (Patient not taking: Reported on 1/15/2024) 30 tablet 0    torsemide (Demadex) 10 mg tablet TAKE 1 TABLET BY MOUTH EVERY DAY (Patient not taking: Reported on 1/15/2024) 90 tablet 0       Labs:  Last CBC:  Lab Results   Component Value Date    WBC 3.9 (L) 01/17/2024    HGB 7.0 (L) 01/17/2024    HCT 23.8 (L) 01/17/2024    MCV 99 01/17/2024    PLT 74 (L) 01/17/2024       Last RFP:  Lab Results   Component Value Date    GLUCOSE 255 (H) 01/17/2024    CALCIUM 7.7 (L) 01/17/2024     01/17/2024    K 4.0 01/17/2024    CO2 25 01/17/2024     (H) 01/17/2024    BUN 25 (H) 01/17/2024    CREATININE 1.48 (H) 01/17/2024       Last LFTs:  Lab Results   Component Value Date    ALT 29 01/17/2024    AST 25 01/17/2024    GGT 63 (H) 01/07/2021    ALKPHOS 82 01/17/2024    BILITOT 0.2 01/17/2024       Last coags:  Lab Results   Component Value Date    INR 1.0 01/16/2024    APTT 37 01/16/2024       Micro/culture data:  Susceptibility data from last 90 days.  Collected Specimen Info Organism Amoxicillin/Clavulanate Ampicillin Ampicillin/Sulbactam Cefazolin Cefotaxime Ceftriaxone Cefuroxime Ciprofloxacin Gentamicin Levofloxacin Piperacillin/Tazobactam Trimethoprim/Sulfamethoxazole   12/14/23 Urine from Clean Catch/Voided  Serratia marcescens group (1) R R R R  S  S S  S S     Serratia marcescens group (2) R R R R  S R S S S S S   11/20/23 Urine from Clean Catch/Voided Serratia marcescens group R R R R R S  S S  S S       Imaging:  Bedside Midline Imaging  These images are not reportable by radiology and will not be interpreted   by  Radiologists.  ECG 12 lead  Atrial fibrillation  Moderate voltage criteria for LVH, may be normal variant  ST & T wave abnormality, consider inferolateral ischemia or digitalis effect  Prolonged QT  Abnormal ECG  When compared with ECG of 14-JAN-2024 14:13, (unconfirmed)  ST elevation now present in Lateral leads  Confirmed by Mario Alberto Hopson ([id number]) on 1/16/2024 3:40:59 PM  ECG 12 lead  Atrial fibrillation with rapid ventricular response  ST & T wave abnormality, consider inferior ischemia or digitalis effect  Abnormal ECG  When compared with ECG of 17-DEC-2023 03:26,  Atrial fibrillation has replaced Sinus rhythm  Vent. rate has increased BY  57 BPM  ST now depressed in Inferior leads  ST now depressed in Anterior leads  T wave inversion now evident in Lateral leads  QT has lengthened  Confirmed by Mario Alberto Hopson ([id number]) on 1/16/2024 3:39:25 PM      Imaging notable   CTH wo acute ICH,   CXR wo acute cardiopulm process,   CT A/P w/ cardiomegaly w/ CAD,   colonic diverticulosis wo acute diverticulitis, non-obstructing R sided nephrolithiasis    Imaging:  Vascular US Lower Extremity Venous Duplex Bilateral Result Date: 1/15/2024         Indication:    Limb swelling     CONCLUSIONS: Right Lower Venous: No evidence of acute deep vein thrombus visualized in the right lower extremity. Cannot rule out thrombus in non-visualized peroneal vein due to swelling and edema. Left Lower Venous: No evidence of acute deep vein thrombus visualized in the left lower extremity. Cannot rule out thrombus in non-visualized posterior tibial and peroneal veins due to swelling and edema. A      MR brain w and wo IV contrast Result Date:  1/15/2024  Interpreted By:  Sanchez Sandoval, STUDY: MR BRAIN W AND WO IV CONTRAST;      *There are patchy and confluence regions of abnormal signal in the periventricular white matter bilaterally. These are nonspecific findings consistent with demyelination due to ischemic, degenerative or primary demyelinating processes. *Unchanged previous infarction left occipital pole *There is no evidence of mass, iacute infarction or hemorrhage. * There is no measurable change compared to the previous exam.        Assessment/Plan     Bing Holliday is a 63 y.o. 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 initially c/f adrenal insufficiency, but now transferred for inpatient rheum consult iso c/f lupus cerebritis vs meningitis vs adrenal insufficiency as etiology for encephalopathy.    Neuro:  #c/f lupus cerebritis  -see rheumatology section below.   -LP for cell count/diff, total protein, oligoclonal bands, IgG index, anti-ribosomal P ab, anti-neuronal Ab     #seizure?  - c/w Keppra 500 BID   - cvEEG   - Ativan 2 mg IV for prolonged motor seizure lasting more than 3 minutes or a cluster of 3 or more motor seizures in an 8 hour period.    #empiric coverage of meningitis  -immunocompromised on cellcept on prednisone  -pancytopenic at baseline, on admission was tachycardic and hypotensive with lactate 3.4   Plan:   -ampicillin 2 g q24hr  -rocephin 2g BID  -vancomycin   -IV acyclovir 10mg/kg bid   -LP (cell count tube 1 and tube 4, protein, glucose, meningitis panel, IgG, oligoclonal bands, HSV)   -neuro following      Cardiovascular:  #Paroxysmal Atrial fibrillation on eliquis  -Chadsvasc 4  -DOAC held 1/15 in anticipation of LP  -Will consider prophylactic heparin after LP    #Sinus bradycardia  - Hemodynamically  stable  - Will consider atropine if patient becomes unstable    #CAD s/p CABG 2013  -statin    Respiratory:  - No active issues    GI:  - No active issues    Renal/:  #JED on CKD 3  - Baseline appears to be to be between 1.6 and 1.8  - Improving since admission with antibiotics and fluids    #hypernatremia 148  -likely due to NPO status  -based on ideal Na of 140-145, will need 1-2.4 L   -will give 1 L LR      Heme/Onc:  #Pancytopenia  - May be related to lupus  - Has required 2 units of packed red blood cells on 1/16 1/17  - Hemoglobin goal greater than 7, platelets greater than 10, if bleeding platelets greater than 50.  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  - LP for cell count/diff, total protein, oligoclonal bands, IgG index, anti-ribosomal P ab, anti-neuronal Ab   - Rheumatology is following.  Further recommendations based on results of LP    Derm:  - No active issues    Psych:  - No active issues    F: Replete PRN  E: Replete PRN  N: N.p.o., will do tube feeds after procedure   DVT Ppx: Defer until after procedure  GI Ppx: Esomeprazole  Access/Lines: 2 PIV, midline 1/16/ [place] 1/16  Abx: Vancomycin, ampicillin, Rocephin, acyclovir  O2: None     Pain regimen: Tylenol prn   GI Laxative: Holding as patient has a diarrhea since antibiotic initiation    Code status: DNR and No Intubation  Emergency contact: López Lang (son) [phone] (Home Phone)        Renan Lemos  Internal Medicine

## 2024-01-17 NOTE — PROGRESS NOTES
"Vancomycin Dosing by Pharmacy- FOLLOW UP    Bing Holliday is a 63 y.o. year old female who Pharmacy has been consulted for vancomycin dosing for CNS/meningoencephalitis. Based on the patient's indication and renal status this patient is being dosed based on a goal AUC of 500-600.     Renal function is currently stable.    Patient received a 2g loading dose at [place] [facility]     Visit Vitals  /68   Pulse 54   Temp 36.4 °C (97.5 °F) (Temporal)   Resp 16        Lab Results   Component Value Date    CREATININE 1.48 (H) 01/17/2024    CREATININE 1.56 (H) 01/16/2024    CREATININE 1.61 (H) 01/16/2024    CREATININE 1.65 (H) 01/16/2024        Patient weight is No results found for: \"PTWEIGHT\"    No results found for: \"CULTURE\"     I/O last 3 completed shifts:  In: 2385.5 (24.8 mL/kg) [Blood:312.5; NG/GT:10; IV Piggyback:2063]  Out: 785 (8.2 mL/kg) [Urine:785 (0.2 mL/kg/hr)]  Weight: 96 kg   [unfilled]    Lab Results   Component Value Date    PATIENTTEMP 37.0 11/13/2022        Assessment/Plan    Will start 1500mg Q24hr starting at 1930 tonight.     This dosing regimen is predicted by [employer] to result in the following pharmacokinetic parameters:  Regimen: 1500 mg IV every 24 hours.  Start time: 19:27 on 01/18/2024  Exposure target: AUC24 (range)400-600 mg/L.hr   AUC24,ss: 568 mg/L.hr  Probability of AUC24 > 400: 84 %  Ctrough,ss: 17.7 mg/L  Probability of Ctrough,ss > 20: 39 %  Probability of nephrotoxicity (Lodise YUDY 2009): 14 %    The next level will be obtained on 1/18 with AM labs. May be obtained sooner if clinically indicated.   Will continue to monitor renal function daily while on vancomycin and order serum creatinine at least every 48 hours if not already ordered.  Follow for continued vancomycin needs, clinical response, and signs/symptoms of toxicity.       Carson Velázquez, PharmD, BCPS           "

## 2024-01-17 NOTE — CONSULTS
Nutrition Initial Assessment:   Nutrition Assessment    Reason for Assessment: Provider consult order    Patient is a 63 y.o. female presenting from an OSH for rheum eval as well as vEEG.  She was admitted to OSH on 1/14 with weakness and confusion.  Concern for lupus-related encephalopathy with new seizures.  Per RN, team is ruling out meningitis and c. Diff.     Pt with a dobhoff in place for enteral access.  Appears that she was on TPN at OSH for unclear reason.     Past history includes T2DM, adrenal insufficiency on prednisone, CKD (baseline Cr ~1.9), SLE (c/b lupus cerebritis and Jaccoud arthropathy, on cellcept), A.Fib on Eliquis, HTN     Nutrition History:  Food and Nutrient History: Met with patient.  She was able to provide limited information to RDN at visit.  Thinks she was eating well PTA.  Does not believe she has lost any weight but she cannot state a usual body weight for herself.  She was not doing anything like Boost or Ensure PTA.  Denies issues with N/V/D and constipation.       Anthropometrics:      Weight: 99.3 kg (218 lb 14.7 oz)   BMI (Calculated): 33.29  IBW/kg (Dietitian Calculated): 63.6 kg  Percent of IBW: 156 %       Weight History:   Wt Readings from Last 10 Encounters:   01/17/24 99.3 kg (218 lb 14.7 oz)   01/17/24 96 kg (211 lb 10.3 oz)   12/26/23 93 kg (205 lb)   12/18/23 93.4 kg (205 lb 14.6 oz)   11/30/23 105 kg (231 lb)   11/20/23 94.8 kg (208 lb 15.9 oz)   10/19/23 94.6 kg (208 lb 8.9 oz)   09/14/23 94.3 kg (207 lb 12.8 oz)   08/01/23 95.3 kg (210 lb)   07/07/23 98.4 kg (217 lb)       Weight Change %:       Nutrition Focused Physical Exam Findings:    Subcutaneous Fat Loss:   Orbital Fat Pads: Well nourshed (slightly bulging fat pads)  Buccal Fat Pads: Well nourished (full, rounded cheeks)  Triceps: Well nourished (ample fat tissue)  Muscle Wasting:  Temporalis: Well nourished (well-defined muscle)  Pectoralis (Clavicular Region): Well nourished (clavicle not  visible)  Deltoid/Trapezius: Well nourished (rounded appearance at arm, shoulder, neck)  Quadriceps: Well nourished (well developed, well rounded)  Gastrocnemius: Well nourished (well developed bulbous muscle)  Edema:     Physical Findings:  Skin: Positive (L breast wound, PI to butt, L foot ulcer x 2)    Nutrition Significant Labs:  BMP Trend:   Results from last 7 days   Lab Units 01/17/24  0915 01/17/24  0022 01/16/24  1950 01/16/24  1439   GLUCOSE mg/dL 141* 255* 268* 223*   CALCIUM mg/dL 7.9* 7.7* 7.8* 7.8*   SODIUM mmol/L 148* 145 144 146*   POTASSIUM mmol/L 4.0 4.0 3.9 3.9   CO2 mmol/L 25 25 25 26   CHLORIDE mmol/L 114* 112* 113* 114*   BUN mg/dL 24* 25* 27* 31*   CREATININE mg/dL 1.43* 1.48* 1.56* 1.61*    , A1C:  Lab Results   Component Value Date    HGBA1C 6.9 (A) 07/12/2023   , BG POCT trend:   Results from last 7 days   Lab Units 01/17/24  1138 01/17/24  0853 01/17/24  0638 01/16/24  2350 01/16/24  2159   POCT GLUCOSE mg/dL 93 145* 244* 230* 242*    , Renal Lab Trend:   Results from last 7 days   Lab Units 01/17/24  0915 01/17/24  0022 01/16/24  1950 01/16/24  1439   POTASSIUM mmol/L 4.0 4.0 3.9 3.9   PHOSPHORUS mg/dL 2.8 2.6 2.0* 2.4*   SODIUM mmol/L 148* 145 144 146*   MAGNESIUM mg/dL 1.96 2.11  --   --    EGFR mL/min/1.73m*2 42* 40* 37* 36*   BUN mg/dL 24* 25* 27* 31*   CREATININE mg/dL 1.43* 1.48* 1.56* 1.61*    , Vit D:   Lab Results   Component Value Date    VITD25 34 12/10/2022        Nutrition Specific Medications:  Scheduled medications  acyclovir, 10 mg/kg (Ideal), intravenous, q12h  ampicillin, 2 g, intravenous, q4h  cefTRIAXone, 2 g, intravenous, q12h  [START ON 1/18/2024] pantoprazole, 40 mg, oral, Daily before breakfast   Or  [START ON 1/18/2024] esomeprazole, 40 mg, nasoduodenal tube, Daily before breakfast   Or  [START ON 1/18/2024] pantoprazole, 40 mg, intravenous, Daily before breakfast  folic acid, 1 mg, oral, Daily  hydrocortisone sodium succinate, 75 mg, intravenous, q6h  insulin  lispro, 0-10 Units, subcutaneous, q4h  levETIRAcetam, 500 mg, intravenous, q12h  vancomycin, 1,500 mg, intravenous, q24h      Continuous medications     PRN medications     I/O:   Last BM Date: 01/17/24; Stool Appearance: Loose (01/17/24 0932)      Dietary Orders (From admission, onward)       Start     Ordered    01/17/24 0831  NPO Diet; Effective now  Diet effective now         01/17/24 0830                     Estimated Needs:   Total Energy Estimated Needs (kCal):  (2169-7226)  Method for Estimating Needs: MSJ= 1605  Total Protein Estimated Needs (g):  (75-85)  Method for Estimating Needs: 1.2-1.4 x 63.6kg            Nutrition Diagnosis        Nutrition Diagnosis  Diagnosis Status (1): New  Nutrition Diagnosis 1: Swallowing difficulity  Related to (1): poor mental status/weakness  As Evidenced by (1): need for dobhoff for meds and nutrition     TF at goal= 1980kcals, 109grams protein daily    Nutrition Interventions/Recommendations         Nutrition Prescription:  For tube feed, can do Glucerna 1.5@ start rate of 25mls/hr.  Increase by 10mls q6hrs until goal rate of 55mls/hr reached.  Water flushes per team's discretion-- goal rate provides 1000mls free water daily.         Time Spent/Follow-up Reminder:   Time Spent (min): 60 minutes  Last Date of Nutrition Visit: 01/17/24  Nutrition Follow-Up Needed?: Dietitian to reassess per policy  Follow up Comment: TF via dobhoff

## 2024-01-17 NOTE — CONSULTS
"GENERAL NEUROLOGY CONSULT NOTE    History of Present Illness: Bing Holliday is a 63 y.o. 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 -dx per MRI findings remotely, on MMF &chronic steroids), CVA (1990s, L PCA territory), HTN, COPD, GERD, T2DM presenting with weakness, AMS, diarrhea.   Neurology consulted for c/f meningitis and seizures.     - Represented to [facility] with progressive weakness, confusion, diarrhea and shivering on 01/14/2023 after recent 5 day admission in 12/23'' for UTI.  Was found to be hypotensive (80s/60s), in afib with RVR 110s. With a JED (Cr 1.6 bl -> 2.24), lactate 3, hypoglycemic in the 60s and hypernatremic in the 150s with an elevated lactate 3.84    - On 1/16/23 later was noticed to have paroxysmal spells of \"facial twitching, lip smacking, altered level of awareness, staring spells, speech arrest, and jerking movements of bilateral upper extremities c/f seizures. Loaded with 1 g LEV and started on  BID d/t poor kidney function.  MRI brain w/wo contrast personally reviewed and notable for L occipitals encephalomalacia and periventricular/juxtacortical white matter changes on FLAIR imaging similar to imaging from 03/2023. Prelim EEG remarkable for severe diffuse encephalopathy.     - She was transferred to  for c/f cvEEG, meningitis/encephalitis evaluation given immunosuppression, and multidisciplinary evaluation.     - Per chart review she was seen in 2019 by neurology for facial twitching described as a rubber band hitting the right side of her chin, lasting a few seconds, occurring multiple times a day, not triggered by stimulation. Used to get tremors in her face starting around 2480-8044.     - On interview with the patient: She was awake, able to carry conversation. Reports that she was not in any pain, and that she feels generally weak.  Unable to provide further history d/t waxing and waning mental status. Denies " Headache, nausea, vomiting or abdominal pain. Endorses bilateral blurry vision. Unable to provide seizure history.       [facility] Summary:   - Vitals:   VD95v-72c/60s HR:60s-110s, EKG afib with RVR  Most recent)130s/60s  HR 60s   - Labs:     CBC: 3.9 >7/23.8 <74, Bl HgB 9-10   RFP:  145/4.0112/2525/1.48 <255 Cr: 2.24 on admission , Bl Cr 1.6-2.1   LFTs: normal    Flu/RSV negative    UA:  Protein +2    Bcx pending    - Imagin/15 MRI brain w/wo contrast personally reviewed and notable for L occipitals encephalomalacia and periventricular/juxtacortical white matter changes on FLAIR imaging similar to imaging from 2023 [facility] personally reviewed and negative for loss of gray white matter differentiation, or hyperdensities c/f hemorrhage.    CXR: no consolidations   CT C/A/P - CAD, diverticulosis, right sided nephrolithiasis   - Interventions:    D50    Stress dose steroids started    Broad spectrum antibiotics   Stress dose steroids started    Ativan 1g , LEV 1g Load + 500 BID   - Prelim EEG: severe diffuse encephalopathy       ROS: All systems reviewed and were negative except as above    Home Medications:    Scheduled Medications  acyclovir, 10 mg/kg (Ideal), intravenous, q12h  ampicillin, 2 g, intravenous, q4h  cefTRIAXone, 2 g, intravenous, q12h  enoxaparin, 40 mg, subcutaneous, q24h  [START ON 2024] pantoprazole, 40 mg, oral, Daily before breakfast   Or  [START ON 2024] esomeprazole, 40 mg, nasoduodenal tube, Daily before breakfast   Or  [START ON 2024] pantoprazole, 40 mg, intravenous, Daily before breakfast  folic acid, 1 mg, oral, Daily  insulin lispro, 0-10 Units, subcutaneous, q4h  levETIRAcetam, 500 mg, intravenous, q12h  vancomycin, 1,500 mg, intravenous, q24h     Continuous Medications    PRN Medications         Past Medical History:    has a past medical history of Acute upper respiratory infection, unspecified (2020), Acute upper respiratory infection,  unspecified (09/30/2015), Arthritis, Body mass index (BMI) 23.0-23.9, adult (10/15/2021), Body mass index (BMI) 33.0-33.9, adult (03/04/2020), Cardiomegaly (08/27/2013), Chronic kidney disease, stage 3 unspecified (CMS/HCC) (07/02/2013), Disease of pericardium, unspecified (07/02/2013), Encounter for follow-up examination after completed treatment for conditions other than malignant neoplasm (10/06/2022), Generalized contraction of visual field, right eye (01/29/2015), Homonymous bilateral field defects, right side (04/29/2016), Hypertensive chronic kidney disease with stage 1 through stage 4 chronic kidney disease, or unspecified chronic kidney disease (07/02/2013), Laceration without foreign body, left foot, initial encounter (07/03/2018), Migraine with aura, not intractable, without status migrainosus (10/24/2022), Other conditions influencing health status (07/02/2013), Other conditions influencing health status (07/02/2013), Other conditions influencing health status (07/02/2013), Other conditions influencing health status (05/22/2015), Other conditions influencing health status (10/24/2022), Other conditions influencing health status (03/14/2022), Other long term (current) drug therapy (10/24/2022), Personal history of diseases of the blood and blood-forming organs and certain disorders involving the immune mechanism (07/02/2013), Personal history of diseases of the skin and subcutaneous tissue (08/11/2015), Personal history of nephrotic syndrome (07/02/2013), Personal history of other diseases of the circulatory system (08/27/2013), Personal history of other diseases of the nervous system and sense organs, Personal history of other diseases of the respiratory system, Personal history of other infectious and parasitic diseases (07/02/2013), Personal history of other specified conditions, Personal history of other specified conditions (08/27/2013), Puckering of macula, right eye (10/24/2022), Raynaud's syndrome  "without gangrene (07/02/2013), Systemic lupus erythematosus, unspecified (CMS/[facility]) (07/24/2015), Systemic lupus erythematosus, unspecified (CMS/[facility]) (07/24/2015), Systemic lupus erythematosus, unspecified (CMS/[facility]) (07/24/2015), Type 2 diabetes mellitus with diabetic nephropathy (CMS/[facility]) (07/02/2013), Type 2 diabetes mellitus with mild nonproliferative diabetic retinopathy without macular edema, left eye (CMS/HCC) (07/27/2015), Type 2 diabetes mellitus with mild nonproliferative diabetic retinopathy without macular edema, unspecified eye (CMS/[facility]) (07/24/2015), Type 2 diabetes mellitus with proliferative diabetic retinopathy without macular edema, right eye (CMS/[facility]) (07/27/2015), Type 2 diabetes mellitus with proliferative diabetic retinopathy without macular edema, unspecified eye (CMS/[facility]) (07/24/2015), Unspecified acute and subacute iridocyclitis (07/24/2015), and Unspecified open wound, left foot, sequela (07/03/2018).    Past Surgical History:    has a past surgical history that includes Eye surgery (03/06/2015); Total hip arthroplasty (01/29/2015); Cholecystectomy (01/29/2015); Other surgical history (01/29/2015); Other surgical history (01/29/2015); Ankle surgery (01/29/2015); Foot surgery (01/29/2015); MR angio head wo IV contrast (7/26/2013); MR angio neck wo IV contrast (7/26/2013); CT guided percutaneous biopsy bone deep (5/4/2021); MR angio head wo IV contrast (9/17/2021); MR angio neck wo IV contrast (9/17/2021); MR angio neck wo IV contrast (3/25/2023); and MR angio head wo IV contrast (3/25/2023).    Allergies:   Allergies   Allergen Reactions    Ace Inhibitors Swelling     'FACIAL TWISTING\" \"LOOKS LIKE I HAD A STROKE IN MY SLEEP\"    Hydroxychloroquine Other     RETINAL BLEEDING      Lisinopril Swelling    Penicillins Unknown     tolerates cephalosporins-unknown childhood allergy      Sulfa (Sulfonamide Antibiotics) Hives       Family History:   Family History   Problem Relation Name Age of Onset    " Other (RENAL DISEASE) Mother          END STAGE    Other (CARDIAC DISORDER) Mother      Cataracts Mother      Stroke Mother      Diabetes Mother      Kidney disease Mother      Lupus Mother      Other (CARDIAC DISORDER) Father      COPD Father      Glaucoma Father      Hypertension Father      Sleep apnea Father      Other (CARDIAC DISORDER) Sister      Depression Sister      Kidney disease Sister      Sickle cell trait Sister      Sleep apnea Daughter         Past Social History:   Smoking: Former  Alcohol: Never  Functional status: SNF, uses a walker     Vitals:   Temp:  [35 °C (95 °F)-36.8 °C (98.2 °F)] 35 °C (95 °F)  Heart Rate:  [] 70  Resp:  [8-44] 13  BP: (113-158)/(58-97) 146/68    Physical Exam:   General Appearance:  No distress, alert, interactive and cooperative.      Mental State:  Somnolent, eyes open to verbal stimulation. Orientation was normal to time, place, and person. Follows simple commands. Tracks across midline. Speech low volume, fluent w/o aphasia or dysarthria.     Cranial Nerves:   CN: Visual fields full to confrontation - limited d/t mental status   CN 3, 4, 6: Pupils round, 4 mm in diameter, equally reactive to light. Lids symmetric; no ptosis. EOMs normal alignment, no nystagmus  CN 5: Facial sensation intact bilaterally.   CN 7: Normal and symmetric facial strength. Nasolabial folds symmetric.   CN 8: Hearing intact to voice  CN 9: Palate elevates symmetrically.   CN 11: Normal strength of shoulder shrug and neck turning.   CN 12: Tongue midline, with normal bulk and strength; no fasciculations.     Motor: Muscle bulk and tone were normal in both upper and lower extremities. No fasciculations, tremor or other abnormal movements were present. Negative brudinski sign    Strength   R L  Deltoid  3 3  Biceps  5 5  Triceps  5 5    5 5  *bl contractueres of the hand in ulnar deviated position     Hip flexion      3 3  Knee flexion       3 3  Knee extension    3 3    Reflexes:  Right/ Left:  Biceps trace/trace, brachioradialis trace/trace,  patellar absent/absent  Mute plantar reflexes, no clonus     Sensory: In both upper and lower extremities, sensation was intact to light touch    Coordination: Unable to assess d/t weakness .      Gait: Unable to assess d/t weakness     Labs:   Results from last 72 hours   Lab Units 01/17/24  0022 01/16/24  1950 01/16/24  1439   WBC AUTO x10*3/uL 3.9* 4.2* 3.7*   HEMOGLOBIN g/dL 7.0* 7.4* 6.5*  6.5*   HEMATOCRIT % 23.8* 24.4* 21.7*  21.7*   PLATELETS AUTO x10*3/uL 74* 75* 82*      Results from last 72 hours   Lab Units 01/17/24  0022 01/16/24  1950 01/16/24  1439 01/16/24  1204 01/16/24  0555 01/15/24  0834   SODIUM mmol/L 145 144 146*   < > 152* 147*   POTASSIUM mmol/L 4.0 3.9 3.9   < > 4.1 4.4   CHLORIDE mmol/L 112* 113* 114*   < > 119* 117*   CO2 mmol/L 25 25 26   < > 23 22   BUN mg/dL 25* 27* 31*   < > 32* 39*   CREATININE mg/dL 1.48* 1.56* 1.61*   < > 1.55* 1.59*   MAGNESIUM mg/dL 2.11  --   --   --  1.96 2.07   PHOSPHORUS mg/dL 2.6 2.0* 2.4*   < > 3.1 2.7    < > = values in this interval not displayed.      Results from last 72 hours   Lab Units 01/17/24  0022 01/15/24  0834 01/14/24  1428   ALK PHOS U/L 82 74 96   AST U/L 25 33 33   ALT U/L 29 33 38   BILIRUBIN TOTAL mg/dL 0.2 0.3 0.2               BNP   Date/Time Value Ref Range Status   03/20/2023 11:31 PM 98 0 - 99 pg/mL Final     Comment:     .  <100 pg/mL - Heart failure unlikely  100-299 pg/mL - Intermediate probability of acute heart  .               failure exacerbation. Correlate with clinical  .               context and patient history.    >=300 pg/mL - Heart Failure likely. Correlate with clinical  .               context and patient history.  BNP testing is performed using different testing   methodology at [facility] than at other   [facility] hospitals. Direct result comparisons should   only be made within the same method.         Lab Results   Component Value Date     GLUCOSEU NEGATIVE 01/14/2024    BLOODU NEGATIVE 01/14/2024    PROTUR 100 (2+) (N) 01/14/2024    NITRITEU NEGATIVE 01/14/2024    LEUKOCYTESU NEGATIVE 01/14/2024    WBCU NONE 01/14/2024    RBCU NONE 01/14/2024         Assessment  Bing Holliday is a 63 y.o. 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 -dx per MRI findings remotely, on MMF &chronic steroids), CVA (1990s, L PCA territory), HTN, COPD, GERD, T2DM presenting with weakness, AMS, diarrhea.    She was noticed to have paroxysmal episodes of facial twitching, lip smacking, staring, and BL UE jerking movements c/f seizures. Was loaded with renally dosed Keppra and started on 500 BID. Transferred from [facility] to [facility] for multidisciplinary evaluation of meningitis/encephalitis and seizures iso of immunosuppression.   On exam she is oriented x4, follows simple commands and is symmetrically weaker in the lower compared to the uppers, is areflexic in the lowers and has no menigismus    MRI brain w/wo contrast personally reviewed and notable for L occipitals encephalomalacia iso of old stroke and periventricular/juxtacortical white matter changes on FLAIR imaging similar to imaging from 03/2023. These FLAIR changes are less suggestive of lupus cerebritis and more suggestive of chronic age related white matter changes (as minimal to no white matter changes were seen on MRI from 2005), however history is limited as to what precipitated the diagnosis of lupus cerebritis and cannot definitively rule it out.      The paroxysmal event semiology of oroailimentary automatisms -> dialepsis -> BL UE clonic  is c/f seizures and would benefit from cvEEG. Given the immunosuppression agree, that she would benefit from LP to r/o meningitis/encephalitis. Further LP could prove useful in clarifying the lupus cerebritis diagnosis  Would also recommend consideration of GBS given LE weakness and areflexia iso of recent gi illness.        Recommendations  - c/w Keppra 500 BID   - LP  (cell count tube 1 and tube 4, protein, glucose, meningitis panel, IgG, oligoclonal bands, HSV)   - cvEEG   - Ativan 2 mg IV for prolonged motor seizure lasting more than 3 minutes or a cluster of 3 or more motor seizures in an 8 hour period.  - If patient has a clinical seizure please alert the neurology team.     General Neurology will continue to follow.     Recommendations are not final until staffing with attending.     Rut Ge MD  Department of Neurology, PGY-2  General [id number]      -----------------------------------------------------------------  Senior Resident Addendum:  Patient seen in the MICU. Awake, alert, oriented to self only, thinks she is at home and does not know the month, season or year even given choice. Follows commands briskly to open, close eyes and participate in motor exam. Tracks well, EOMI. No apparent facial droop. Moving all limbs spontaneously.BUE at least 4/5, BLE 3/5.  No nuchal rigidity appreciated.    MRI brain images from 1/15/2024 personally reviewed.  There is an old left occipital lobe encephalomalacia likely recommending her known prior stroke; there were some nonspecific white matter changes that may represent lupus cerebritis, however differential would be chronic small vessel disease, less likely demyelinating disease.     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  Would continue Keppra 500 mg twice daily at this point and follow EEG read. Recommend pursuing LP and look for inflammatory or infectious etiologies.  If able, please have lab save CSF fluids for additional testing (such as autoimmune encephalitis panel which needs 4cc) if needed.  Appreciate rheumatology recommendations.    I examined & discussed the patient above. Pt discussed with the attending, Dr. Resendez. Recommendations are not finalized until note is signed by the  attending.    Aminah Steinberg MD  PGY-4 Neurology  Gen Neuro Pager: [phone]

## 2024-01-17 NOTE — NURSING NOTE
Received call from transfer center. There is a bed available. The next transport van will be leaving at approximately 7am.

## 2024-01-17 NOTE — CONSULTS
HPI  63-year-old female with T2DM and SLE on prednisone and Cellcept (c/b cerebritis, nephritis w/ baseline Cr 1.9, Jaccoud arthropathy, secondary adrenal insuffiencey, and chronic pancytopenia) who presented to [facility] on 1/14/24 for confusion and weakness.     She has had multiple recurrent hospitalizations secondary to hypoglycemia and AMS, most recent at [facility] from 12/17 - 12/21/23. Of note, patient was discharged from a SNF 4-5 days prior to admission for altered mental status and treatment of UTI.  Over the last several days at home, she became progressively weaker and more confused, with episodes of diarrhea (had Norovirus detected in 11/2023). Patient was admitted for workup of her weakness and altered mental status and treatment of her electrolyte abnormalities. Hospital course c/b JED (likely from dehydration and diarrhea) as well as several episodes of hypoglycemia requiring D50 administration and stress dosed steroids. She was initially responsive to questions but A&Ox1. On 1/16, was noted to be significantly altered, with paroxysmal spells of facial twitching, lip smacking, altered level of awareness, staring spells, speech arrest, and jerking movements of bilateral upper extremities which were concerning for seizure activity. She was given Ativan and loaded with Keppra. Labwork was significant for hypernatremia of 152 for which D5W was initiated. MRI brain w/wo contrast obtained 1/15 and demonstrated new patchy areas of nonspecific demyelination in subcortical and periventricular white matter. 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 While in ICU at [facility], there was no observed twitching of the face or upper extremities. Prelim vEEG showed severe diffuse encephalopathy. Lumbar puncture was held off due to recent anticoagulation use on 1/15. She was started on  prophylactic antibiotics for CNS coverage w/ vanc, ceftriaxone, ampicillin, and acyclovir on 1/16. Cellcept has been held since 1/15.     Of note, patient also has a chronic sacral wound but it was evaluated at [facility] to be without signs of infection.     Patient was transferred to [facility] [place] ICU for vEEG monitoring and multidisciplinary care from rheum, endo, neuro, and ID. At time of transfer, patient was A&O x0, not responding to questions, sensitive to light stimulation, and moving all 4 extremities. Patient was unable to provide history at bedside today due to altered mental status.     PMHx: T2DM, SLE,  CAD s/p CABG 2013, A.Fib on Eliquis, and HTN   Meds: acyclovir, ampicillin, vancomycin, ceftriaxone, keppra, hydrocortisone     Labs:    WBC 4.9 (up from 3.9) ; WBC ranging from 4.9-8 during previous admissions in Nov/Dec 2023    H/H 6.9/21.8 (1/17)  Plt 82 (1/17)     Na 148 (1/17) (peaked at 152 on 1/15)    Bun/Cr 24/1.43 (down from 2.24 on admission on 1/14)    Glucose 141 (1/17)     TSH 0.55 (1/16)  B12 1014 (1/16)    CRP 2.52 (1/15)  ESR 34 (1/15)    Lactate 1.4 (1.17), peaked at 2.1 (1/14)    UA (1/14) unremarkable     MRSA nares 1/16 - in process  Blood cx 1/14- no growth at 2 days  Blood cx 1/17- in process    C diff PCR in process    Imaging:  MRI 1/15/24:  *There are patchy and confluence regions of abnormal signal in the periventricular white matter bilaterally. These are nonspecific findings consistent with demyelination due to ischemic, degenerative or primary demyelinating processes. *Unchanged previous infarction left occipital pole *There is no evidence of mass, acute infarction or hemorrhage. * There is no measurable change compared to the previous exam.     CT A/P w/o 1/14/24:  1. Cardiomegaly with coronary artery disease. 2. Colonic diverticulosis is present without evidence of acute diverticulitis. 3. Nonobstructing right-sided nephrolithiasis     CXR 1/17/2024  New subtle patchy and hazy bibasilar  airspace opacities may represent atelectasis/infiltrates. A    Physical Exam  Vitals  T 36-36.8  P   /58 - 158/81  R 8-44  SpO2     General: sitting up in bed, in no acute distress, occasionally staring off   Neuro: oriented to person and place but not time; follows simple commands (toe wiggling, hand squeezes); negative Kernig's and brudzinski's signs   HEENT: no neck stiffness; moist mucus membranes; EEG leads in place  Pulm: clear breath sounds bilaterally, breathing comfortably on room air  CV: RRR  Abdomen: soft, non tender  Ext: warm, SCDs in place    Impression:  63-year-old female with SLE on prednisone and cellcept, c/b cerebritis, nephritis w/ baseline Cr 1.9, secondary adrenal insufficiency, pancytopenia, and Jaccoud arthropathy who initially presented to [facility] on 1/14/24 for confusion and weakness I/s/o diarrheal illness and was transferred to [facility] [place] [facility] for management of suspected status epilepticus and possible CNS infection. Lack of fever and meningeal signs and overall subacute onset of symptoms argue against bacterial meningitis. Overall clinical picture is more suggestive of encephalitis given her altered mental status. HSV encephalitis is possible given concern for new seizures and her immunocompromised status. Other viral causes of encephalitis are also possible in addition to fungal causes given immunocompromised status. Other differentials include neuropsychiatric manifestions of lupus, especially with history of recent hospitalization in 2022 with psychosis deemed likely related to lupus. Altered mental status could also be due to metabolic encephalopthy given episodes of hyper and hypoglycemia and hypernatremia.    Recommendations:  -Continue vanc, cefrtiaxone, ampicillin, and acyclovir until CSF studies result  -Please obtain LP w/ cell count and diff, glucose, protein, gram stain, and culture; CSF biofire; CSF HSV PCR; CSF and serum cryptococcal antigen    Ainsley Ortiz, M4, ID  Consults    Patient discussed with Dr. Schrader

## 2024-01-17 NOTE — PROGRESS NOTES
"Vancomycin Dosing by Pharmacy- INITIAL    Bing Holliday is a 63 y.o. year old female who Pharmacy has been consulted for vancomycin dosing for CNS/meningoencephalitis. Based on the patient's indication and renal status this patient will be dosed based on a goal AUC of 500-600.     Renal function is currently declining.    Visit Vitals  /67 (BP Location: Left arm, Patient Position: Lying)   Pulse 60   Temp 36.3 °C (97.3 °F) (Temporal)   Resp (!) 8        Lab Results   Component Value Date    CREATININE 1.56 (H) 01/16/2024    CREATININE 1.61 (H) 01/16/2024    CREATININE 1.65 (H) 01/16/2024    CREATININE 1.55 (H) 01/16/2024        Patient weight is No results found for: \"PTWEIGHT\"    No results found for: \"CULTURE\"     I/O last 3 completed shifts:  In: 1312.5 (14.1 mL/kg) [Blood:312.5; IV Piggyback:1000]  Out: 330 (3.5 mL/kg) [Urine:330 (0.1 mL/kg/hr)]  Weight: 93.2 kg   [unfilled]    Lab Results   Component Value Date    PATIENTTEMP 37.0 11/13/2022          Assessment/Plan     Patient has already been given a loading dose of 2000 mg.  Will initiate vancomycin maintenance,  1250 mg every 24 hours.    This dosing regimen is predicted by [employer] to result in the following pharmacokinetic parameters:  Loading dose: 2000 mg  Regimen: 1250 mg IV every 24 hours.  Start time: 19:27 on 01/18/2024  Exposure target: AUC24 (range)400-600 mg/L.hr   AUC24,ss: 505 mg/L.hr  Probability of AUC24 > 400: 74 %  Ctrough,ss: 15.9 mg/L  Probability of Ctrough,ss > 20: 30 %  Probability of nephrotoxicity (Lodise YUDY 2009): 11 %    Follow-up level will be ordered on 1/18 at 0600 unless clinically indicated sooner.  Will continue to monitor renal function daily while on vancomycin and order serum creatinine at least every 48 hours if not already ordered.  Follow for continued vancomycin needs, clinical response, and signs/symptoms of toxicity.       Irving Hays, PharmD       "

## 2024-01-17 NOTE — CONSULTS
Bing Holliday is a 63 y.o. 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 difficile  (2/2023 ), history of norovirus (detected 11/23), recent diagnosis of secondary adrenal insufficiency? (With prednisone 10 mg +5 mg daily),  presented to [facility] on 1/14 with progressive weakness, confusion, diarrhea , back pain, initially admitted for management of secondary adrenal insufficiency, developed seizure-like activity, started on antiepileptic medications,Transferred on 1/17 to [facility] MICU to rule out meningitis (LP), continuous video EEG, and rheumatology consult.    Endocrinology consulted for management of adrenal insufficiency and steroid dosing.    Per chart review :  In the ED,   -her HR was 114 (A-fib with RVR?), BP 84/69,   -blood sugar 66, sodium 148, potassium 4, creatinine 2.24, BUN 51, no leukocytosis, lactate 3.4-> 2.1,  UA 2+ protein no WBCs or RBCs   -CXR no acute cardiopulmonary process;   -CT abdomen pelvis showed cardiomegaly with coronary artery disease, colonic diverticulosis without acute diverticulitis, nonobstructing right-sided nephrolithiasis  -CT head showed no acute intracranial hemorrhage or infarct.   -Patient was given dextrose, broad-spectrum antibiotics, and IV fluids    She was admitted for secondary adrenal insufficiency.     During her stay, patient developed new onset seizure, started on Keppra, hypernatremia, (received D5 water), started on stress dose hydrocortisone 75-6 after receiving 100 mg once on 1/14 , MRI of the brain performed with and without contrast which was consistent with nonspecific CNS demyelinating disease. , neurology were  consulted, prelim EEG showed diffuse severe encephalopathy, without seizure activity recorded, She was started on prophylactic antibiotics for CNS coverage w/ vanc, ceftriaxone, ampicillin, and acyclovir on 1/16.     Per chart review:  -2021?She was started on MFM and prednisone for her lupus. She had been started on high doses of prednisone IV and then started on PO pred 15 mg in Feb 2022  but was tapered down to 2.5 mg by Nov 2022 by rheumatology.    -During an admission in December 2022, patient was admitted for generalized weakness, hypoglycemia versus SLE flare versus steroid withdrawal, a.m. cortisol 4, prednisone dose was increased from 5 to 10 mg, as well as fludrocortisone 0.05 mg, dose increased to 0.1 mg daily,  insulin level (10), c-peptide (3.6), proinsulin 16.7 , b-hydroxybutyrate (0.09) and sulfonylurea screen ? (Labs checked with a blood glucose below 55)  She was discharged to SNF on prednisone 10 mg in a.m., 5 mg in the afternoon, to resume 2.5 mg?    -Readmitted in February 2023 with hematochezia and C. difficile infection+ COVID infection, prednisone dose increased to 10 mg twice daily and fludrocortisone stopped.  Patient was seen by endocrinology at [facility], due to concerns of poor p.o. absorption, patient was switched to IV hydrocortisone 50 mg  There was suspicion for insulin oversecretion with elevated pro insulin and insulin levels with hypoglycemia in December 2022. No insulinoma noted on CT abdomen from Feb 2023. She likely had insulin oversecretion which iwas not clearing in  the setting of renal dysfunction   3/22 with BG 54, BHB at the time was elevated at 0.57 which goes against a diagnosis of excess insulin secretion or insulinoma     -Patient was seen by endocrinology at [facility] in November 2023 (Masoud Boudreaux)    Visit Vitals  Pulse 59   Temp 35 °C (95 °F)   Resp 18   LMP  (LMP Unknown)   SpO2 99%   OB Status Postmenopausal   Smoking Status Never        PE:  Under  isolation  Sitting In bed with EEG leads on  On RA  Unable to assess mentation  No tachypnea    Scheduled medications  acyclovir, 10 mg/kg (Ideal), intravenous, q12h  ampicillin, 2 g, intravenous, q4h  cefTRIAXone, 2 g, intravenous, q12h  enoxaparin, 40 mg, subcutaneous, q24h  [START ON 1/18/2024] pantoprazole, 40 mg, oral, Daily before breakfast   Or  [START ON 1/18/2024] esomeprazole, 40 mg, nasoduodenal tube, Daily before breakfast   Or  [START ON 1/18/2024] pantoprazole, 40 mg, intravenous, Daily before breakfast  folic acid, 1 mg, oral, Daily  insulin lispro, 0-10 Units, subcutaneous, q4h  levETIRAcetam, 500 mg, intravenous, q12h  vancomycin, 1,500 mg, intravenous, q24h      Continuous medications     PRN medications     Results for orders placed or performed during the hospital encounter of 01/17/24 (from the past 24 hour(s))   POCT GLUCOSE   Result Value Ref Range    POCT Glucose 145 (H) 74 - 99 mg/dL   Calcium, Ionized   Result Value Ref Range    POCT Calcium, Ionized 1.21 1.1 - 1.33 mmol/L   Coagulation Screen   Result Value Ref Range    Protime 10.4 9.8 - 12.8 seconds    INR 0.9 0.9 - 1.1    aPTT 37 27 - 38 seconds       Lab Results   Component Value Date    CORTISOL 19.0 11/26/2023    ACTH 2.5 (L) 11/21/2023     (H) 01/17/2024    K 4.0 01/17/2024     (H) 01/17/2024    CO2 25 01/17/2024    BUN 24 (H) 01/17/2024    CREATININE 1.43 (H) 01/17/2024    CALCIUM 7.9 (L) 01/17/2024    PROT 4.4 (L) 01/17/2024    BILITOT 0.2 01/17/2024    ALKPHOS 95 01/17/2024    ALT 30 01/17/2024    AST 24 01/17/2024    GLUCOSE 141 (H) 01/17/2024        Lab Results   Component Value Date    CORTISOL 19.0 11/26/2023    ACTH 2.5 (L) 11/21/2023    TSH 0.55 01/16/2024    FREET4 0.86 10/19/2023    PROLACTIN 42.0 (H) 01/16/2024              Imaging:  ----------  -MRI 1/15/24:  *There are patchy and confluence regions of abnormal signal in the periventricular white matter bilaterally. These are nonspecific findings consistent  with demyelination due to ischemic, degenerative or primary demyelinating processes. *Unchanged previous infarction left occipital pole *There is no evidence of mass, acute infarction or hemorrhage. * There is no measurable change compared to the previous exam.      CT A/P w/o 1/14/24:  1. Cardiomegaly with coronary artery disease. 2. Colonic diverticulosis is present without evidence of acute diverticulitis. 3. Nonobstructing right-sided nephrolithiasis      CXR 1/17/2024  New subtle patchy and hazy bibasilar airspace opacities may represent atelectasis/infiltrates.       -11/2023:  CT abdomen pelvis w/o iv contrast:  adrenal glands appear unremarkable     IMPRESSION:  No evidence of bowel obstruction or acute appendicitis. Colon is  diffusely underdistended and not well evaluated. There is  underdistention versus wall thickening of the ascending colon which  may be infectious or inflammatory in etiology. Colonic diverticulosis  without evidence of acute diverticulitis.      Limited evaluation of the urinary bladder secondary to  underdistention and beam hardening artifact. Underdistention versus  mild urinary bladder wall thickening; please correlate with  urinalysis to exclude cystitis.    Assessment/plan:  --------------------     Bing Holliday is a 63 y.o. 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 difficile  (2/2023 ), history of norovirus (detected 11/23), recent diagnosis of secondary adrenal insufficiency? (With prednisone 10 mg +5 mg daily),  presented to [facility] on 1/14 with progressive weakness, confusion, diarrhea , back pain, initially admitted for management  of secondary adrenal insufficiency, developed seizure-like activity, started on antiepileptic medications,Transferred on 1/17 to [facility] MICU to rule out meningitis (LP), continuous video EEG, and rheumatology consult.    Endocrinology consulted for management of adrenal insufficiency and steroid dosing.    Differential diagnosis per team: Viral versus bacterial meningitis, versus CNS lupus.    Patient is currently not in septic shock.  No plans from rheumatology standpoint to give big dose of Methylpred.  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    Endocrinology pager [phone].  Plan communicated to primary team via secure chat and in person.  Case discussed with Dr. Viera

## 2024-01-17 NOTE — ACP (ADVANCE CARE PLANNING)
Confirming Previous Code Status:   Advance Care Planning Note     Discussion Date: 01/17/24   Discussion Participants: son    The patient wishes to discuss Advance Care Planning today and the following is a brief summary of our discussion.     Patient has capacity to make their own medical decisions: No  Health Care Agent/Surrogate Decision Maker documented in chart: Yes    Documents on file and valid:  Advance Directive/Living Will: Yes   Health Care Power of : Yes  Other: [place] DNR form     Communication of Medical Status/Prognosis:   TBD     Communication of Treatment Goals/Options:   DNR/DNI     Treatment Decisions  Goals of Care: quality of life is prioritized; willing to accept low-burden treatments to achieve meaningful goals   Not specifically discussed with son.  Follow Up Plan  Will continue medical workup   Team Members  Dr. Sal (PGY-3), Dr. Mcneil (PGY-2) Dr. Lemos (PGY-1), López Lang (son)  Time Statement: Total phone time spent on advance care planning was 30 minutes with 10 minutes spent in counseling, including the explanation.    Renan Lemos MD  1/17/2024 1:22 PM

## 2024-01-17 NOTE — DISCHARGE SUMMARY
Discharge Diagnosis  Adrenal insufficiency (CMS/HCC)  Acute encephalopathy  Seizure-like activity  Hypernatremia  Acute anemia on chronic pancytopenia    Issues Requiring Follow-Up  MRI with nonspecific findings consistent with demyelination.  vEEG    Test Results Pending At Discharge  Pending Labs       Order Current Status    IgG In process    Igg/Albumin Index In process    Internal Albumin In process    Staphylococcus Aureus/MRSA Colonization, Culture In process    Blood Culture Preliminary result    Blood Culture Preliminary result            Hospital Course   63-year-old female with significant PMH including T2DM, adrenal insufficiency on prednisone 15m every day, CKD (baseline Cr ~1.9), SLE (c/b lupus cerebritis and Jaccoud arthropathy, on cellcept), A.Fib on Eliquis, HTN who presented to [facility] on 1/14/24 for confusion and weakness. She has had multiple recurrent hospitalizations secondary to hypoglycemia and AMS, of most recent here at [facility] from 12/17 - 12/21/23. She was discharged to a SNF, and returned home to her son recently. Over the last several days at home, she had gotten progressively weaker and confused, with several recurrent episodes of ?diarrhea. Hospital course with several episodes of hypoglycemia requiring D50 administration. She was initially encephalopathic, but responsive to questions, about A&Ox1 to self. However, 1/16, was noted to be significantly altered, with facial and bilateral upper extremity twitching. Labwork was significant for a hypernatremia of 152. D5W was initiated. MRI brain w/wo contrast obtained 1/15 demonstrated new patchy areas of nonspecific demyelination. Was evaluated by neurology Dr. 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 She was loaded with IV Keppra 1g, continued on 500mg BID. She did receive Ativan 1mg x2. Patient was transferred to the ICU for vEEG monitoring and due to concerns for status epilepticus.   Transfer to [facility] ICU was initiated by primary team for and patient was accepted. Patient needs Rheumatology evaluation.     While in ICU, no observed twitching of the face or upper extremities at this time. Prelim vEEG showed severe diffuse encephalopathy. Lumbar puncture was held off due to recent anticoagulation use. She was started on prophylactic antibiotics for CNS coverage. Endocrinology was consulted regarding patient's history of secondary adrenal insufficiency, patient is on 75 mg Solu-cortef Q6 which is more than sufficient. Patient remains drowsy, moans occasionally, can follow some commands at times and able to verbalize minimally (yes, no, stop). Corpak was placed for enteral nutrition and PO medications. Patient to be transferred to [facility] ICU.    Pertinent Physical Exam At Time of Discharge  Physical Exam  Constitutional:       Appearance: She is ill-appearing.   HENT:      Head: Normocephalic.      Mouth/Throat:      Mouth: Mucous membranes are dry.   Eyes:      Extraocular Movements: Extraocular movements intact.      Conjunctiva/sclera: Conjunctivae normal.      Pupils: Pupils are equal, round, and reactive to light.   Cardiovascular:      Rate and Rhythm: Regular rhythm. Bradycardia present.      Pulses: Normal pulses.   Pulmonary:      Effort: Pulmonary effort is normal.      Breath sounds: Normal breath sounds.   Abdominal:      General: Abdomen is flat.      Palpations: Abdomen is soft.      Tenderness: There is no abdominal tenderness. There is no guarding.   Musculoskeletal:         General: Tenderness present. No deformity.      Cervical back: Normal range of motion and neck supple.   Skin:     General: Skin is warm and dry.   Neurological:      General: No focal deficit present.      Mental Status: She is disoriented.      Comments: LEWIS, awakes to verbal/tactile stimuli, able to verbalize minimally (yes/no/groans/stop), follows some commands         Home Medications     Medication List       CONTINUE taking these medications     Dexcom G6  misc; Generic drug: Dexcom G4 platinum ; Use   as instructed   Dexcom G6 Sensor device; Generic drug: blood-glucose sensor; Use to   check sugars 3 times daily   Dexcom G6 Transmitter device; Generic drug: Dexcom G4 platinum   transmitter; Use as instructed     ASK your doctor about these medications     acetaminophen 325 mg tablet; Commonly known as: Tylenol   amLODIPine 2.5 mg tablet; Commonly known as: Norvasc   atorvastatin 40 mg tablet; Commonly known as: Lipitor; Take 1 tablet (40   mg) by mouth once daily.   Benlysta 400 mg recon soln IV injection; Generic drug: belimumab   Ca carb-D3-mag bq-hwv-tdcy-Zn 300 mg-20 mcg- 25 mg-0.5 mg tablet   folic acid 1 mg tablet; Commonly known as: Folvite; TAKE 1 TABLET BY   MOUTH EVERY DAY   magnesium oxide 400 mg tablet; Commonly known as: Mag-Ox   melatonin 5 mg tablet   multivitamin tablet   mycophenolate 500 mg tablet; Commonly known as: Cellcept   pantoprazole 40 mg EC tablet; Commonly known as: ProtoNix; TAKE 1 TABLET   BY MOUTH EVERY DAY   * predniSONE 10 mg tablet; Commonly known as: Deltasone; Take 1 tablet   (10 mg) by mouth once daily in the morning. Take first thing when you wake   up every morning.   * predniSONE 5 mg tablet; Commonly known as: Deltasone; Take 1 tablet (5   mg) by mouth see administration instructions. Take one tablet everyday at   2PM scheduled   risperiDONE 0.5 mg tablet; Commonly known as: RisperDAL; TAKE 1 TABLET   BY MOUTH AT BEDTIME   torsemide 10 mg tablet; Commonly known as: Demadex; TAKE 1 TABLET BY   MOUTH EVERY DAY   Trelegy Ellipta 200-62.5-25 mcg blister with device; Generic drug:   fluticasone-umeclidin-vilanter; Inhale 1 puff once daily.  * This list has 2 medication(s) that are the same as other medications   prescribed for you. Read the directions carefully, and ask your doctor or   other care provider to review them with you.       Outpatient Follow-Up  Future  Appointments   Date Time Provider Department Center   1/17/2024 11:00 AM STARLENE HECTORWILL C-[facility]BI[facility] [place]   1/17/2024  4:00 PM INF 02 NRIDGJANES [id number] [place]   2/1/2024  4:00 PM INF 03 NRIDGJANES [id number] [place]   2/8/2024 11:30 AM Claudio Kearney MD [id number] [place]   2/13/2024 10:30 AM Michael Arenas MD [id number] [place]   3/11/2024  3:40 PM Sarah Castellanos DO [id number] [place]       Kinza Vila, APRN-CNP

## 2024-01-17 NOTE — NURSING NOTE
Report called to AMINAH Baig at [facility]. Pt to admit to [facility] to bed 23. Pt left with all personal belonging in two bags.

## 2024-01-17 NOTE — CONSULTS
"Reason For Consult  \"Lupus with possible flare vs. Meningitis\"    History Of Present Illness  Bing Holliday is a 63 y.o. female w/a history of SLE (dx ~ 35yrs ago, follows w/Dr. Castellanos) c/b pancytopenia, lupus cerebritis, lupus nephritis w/RWG9h-7 (LN class V on renal bx 2011); osteoporosis w/spinal compression fx; COPD; adrenal insufficiency; afib on OAC; HTN; GERD, DM2; HFrEF; and pulmonary HTN who presented to [facility] 01/14 from SNF for confusion and weakness. Of note, she was hospitalized at [facility] 12/16/23 - 12/23/23 for hyperkalemia, UTI, and encephalopathy. Encephalopathy at that time attributed to UTI; however, prednisone was also increased to 15mg daily in case of component of adrenal insufficiency and mentation improved. Upon admission to [facility] 01/14, she was found to be hypoglycemic, hypotensive, and hypernatremic. MRI brain 01/15 demonstrated new, patchy areas of non-specific demyelination. Due to encephalopathy and facial/extremity twitching there was concern for status and she was transferred to the ICU and ultimately [facility].     Of note: Patient has had multiple prior hospital admissions with altered mental status and has previously been diagnosed with possible lupus cerebritis. She was seen 02/2022 for AMS and hallucinations that had been attributed to SLE during prior admission to [facility]; however, imaging was normal and no LP was performed. Ultimately she was transferred to [facility] where LP was performed and was unremarkable and infectious etiology ruled out so she was treated empirically with high dose steroids.    Regarding SLE: MONTSERRAT/KATHI panel from 03/2023: MONTSERRAT 1:640, anti-Sm > 8, anti-SSA 1.5, anti-chromatin 3.9; dsDNA WNL but has been slightly elevated in the past (highest ~ 23); no documented history of hypocomplimentemia in our system    Current immunosuppression:  - MMF 1000mg BID  - Prednisone 5mg daily  - Benlysta 10mg/kg IV Q4 weeks    Prior immunosuppression:  - HCQ (discontinued d/t macular " "toxicity)  - AZA (discontinued d/t cytopenias)    She is currently being treated empirically for possible infectious etiology with acyclovir, ampicilling, Rocephin, and vancomycin. She is also on Solu-cortef 75mg Q6 given adrenal insufficiency. Plan is for LP today 01/17.    Upon evaluation, patient is lying in bed alert. She is able to answer some simple questions. When asked how she is feeling she responds \"not right\" but cannot elaborate further. Endorses pain in her hands. Denies oral ulcers. When asked if this feels like prior lupus flares she responds \"no\". Further ROS limited by mental status.     Past Medical History  She has a past medical history of Acute upper respiratory infection, unspecified (03/04/2020), Acute upper respiratory infection, unspecified (09/30/2015), Arthritis, Body mass index (BMI) 23.0-23.9, adult (10/15/2021), Body mass index (BMI) 33.0-33.9, adult (03/04/2020), Cardiomegaly (08/27/2013), Chronic kidney disease, stage 3 unspecified (CMS/[facility]) (07/02/2013), Disease of pericardium, unspecified (07/02/2013), Encounter for follow-up examination after completed treatment for conditions other than malignant neoplasm (10/06/2022), Generalized contraction of visual field, right eye (01/29/2015), Homonymous bilateral field defects, right side (04/29/2016), Hypertensive chronic kidney disease with stage 1 through stage 4 chronic kidney disease, or unspecified chronic kidney disease (07/02/2013), Laceration without foreign body, left foot, initial encounter (07/03/2018), Migraine with aura, not intractable, without status migrainosus (10/24/2022), Other conditions influencing health status (07/02/2013), Other conditions influencing health status (07/02/2013), Other conditions influencing health status (07/02/2013), Other conditions influencing health status (05/22/2015), Other conditions influencing health status (10/24/2022), Other conditions influencing health status (03/14/2022), Other long " term (current) drug therapy (10/24/2022), Personal history of diseases of the blood and blood-forming organs and certain disorders involving the immune mechanism (07/02/2013), Personal history of diseases of the skin and subcutaneous tissue (08/11/2015), Personal history of nephrotic syndrome (07/02/2013), Personal history of other diseases of the circulatory system (08/27/2013), Personal history of other diseases of the nervous system and sense organs, Personal history of other diseases of the respiratory system, Personal history of other infectious and parasitic diseases (07/02/2013), Personal history of other specified conditions, Personal history of other specified conditions (08/27/2013), Puckering of macula, right eye (10/24/2022), Raynaud's syndrome without gangrene (07/02/2013), Systemic lupus erythematosus, unspecified (CMS/HCC) (07/24/2015), Systemic lupus erythematosus, unspecified (CMS/HCC) (07/24/2015), Systemic lupus erythematosus, unspecified (CMS/HCC) (07/24/2015), Type 2 diabetes mellitus with diabetic nephropathy (CMS/[facility]) (07/02/2013), Type 2 diabetes mellitus with mild nonproliferative diabetic retinopathy without macular edema, left eye (CMS/[facility]) (07/27/2015), Type 2 diabetes mellitus with mild nonproliferative diabetic retinopathy without macular edema, unspecified eye (CMS/[facility]) (07/24/2015), Type 2 diabetes mellitus with proliferative diabetic retinopathy without macular edema, right eye (CMS/[facility]) (07/27/2015), Type 2 diabetes mellitus with proliferative diabetic retinopathy without macular edema, unspecified eye (CMS/[facility]) (07/24/2015), Unspecified acute and subacute iridocyclitis (07/24/2015), and Unspecified open wound, left foot, sequela (07/03/2018).    Surgical History  She has a past surgical history that includes Eye surgery (03/06/2015); Total hip arthroplasty (01/29/2015); Cholecystectomy (01/29/2015); Other surgical history (01/29/2015); Other surgical history (01/29/2015); Ankle  surgery (01/29/2015); Foot surgery (01/29/2015); MR angio head wo IV contrast (7/26/2013); MR angio neck wo IV contrast (7/26/2013); CT guided percutaneous biopsy bone deep (5/4/2021); MR angio head wo IV contrast (9/17/2021); MR angio neck wo IV contrast (9/17/2021); MR angio neck wo IV contrast (3/25/2023); and MR angio head wo IV contrast (3/25/2023).     Social History  She reports that she has never smoked. She has never been exposed to tobacco smoke. She has never used smokeless tobacco. She reports that she does not drink alcohol and does not use drugs.    Family History  Family History   Problem Relation Name Age of Onset    Other (RENAL DISEASE) Mother          END STAGE    Other (CARDIAC DISORDER) Mother      Cataracts Mother      Stroke Mother      Diabetes Mother      Kidney disease Mother      Lupus Mother      Other (CARDIAC DISORDER) Father      COPD Father      Glaucoma Father      Hypertension Father      Sleep apnea Father      Other (CARDIAC DISORDER) Sister      Depression Sister      Kidney disease Sister      Sickle cell trait Sister      Sleep apnea Daughter          Allergies  Ace inhibitors, Hydroxychloroquine, Lisinopril, Penicillins, and Sulfa (sulfonamide antibiotics)    Review of Systems  As per HPI  Limited by encephalopathy     Physical Exam  General: Cooperative, in NAD   Eyes: Conjunctiva clear, sclera white, anicteric   Nose: No external deformity, no mucosal crusting or signs of bleeding   Throat/Mouth: Moist mucous membranes, normal dentition, no ulcers or lesions   Lungs: No respiratory distress; lungs CTAB; no wheezes, rales, rhonchi, or stridor   Heart: Normal S1 and S2; bradycardic, normal rhythm; no murmurs, rubs, or gallops  Skin: No rashes, ulcers, tophi, or nodules noted  MSK:   Upper Extremities:   Hands: Jaccoud arthropathy noted with reversible deformities including flexion and ulnar deviation at the MCPs, swan-neck and boutonniere deformities multiple digits  Wrists:  No erythema, warmth, synovitis, or pain of the wrists; normal ROM  Elbows: No erythema, warmth, synovitis, or pain; normal ROM   Lower Extremities:   Knees: No erythema, warmth, swelling, or pain  Ankles: No erythema, warmth, synovitis, or pain  Feet: No gross deformity, erythema, or swelling; MTP squeeze negative bilaterally       Last Recorded Vitals  Pulse 59, temperature 35 °C (95 °F), resp. rate 18, weight 99.3 kg (218 lb 14.7 oz), SpO2 99 %.    Relevant Results  Results for orders placed or performed during the hospital encounter of 01/17/24 (from the past 24 hour(s))   POCT GLUCOSE   Result Value Ref Range    POCT Glucose 145 (H) 74 - 99 mg/dL   Calcium, Ionized   Result Value Ref Range    POCT Calcium, Ionized 1.21 1.1 - 1.33 mmol/L   Lactate   Result Value Ref Range    Lactate 1.4 0.4 - 2.0 mmol/L   Magnesium   Result Value Ref Range    Magnesium 1.96 1.60 - 2.40 mg/dL   Coagulation Screen   Result Value Ref Range    Protime 10.4 9.8 - 12.8 seconds    INR 0.9 0.9 - 1.1    aPTT 37 27 - 38 seconds   CBC and Auto Differential   Result Value Ref Range    WBC 4.9 4.4 - 11.3 x10*3/uL    nRBC 0.6 (H) 0.0 - 0.0 /100 WBCs    RBC 2.27 (L) 4.00 - 5.20 x10*6/uL    Hemoglobin 6.9 (L) 12.0 - 16.0 g/dL    Hematocrit 21.8 (L) 36.0 - 46.0 %    MCV 96 80 - 100 fL    MCH 30.4 26.0 - 34.0 pg    MCHC 31.7 (L) 32.0 - 36.0 g/dL    RDW 20.8 (H) 11.5 - 14.5 %    Platelets 82 (L) 150 - 450 x10*3/uL    Immature Granulocytes %, Automated 1.6 (H) 0.0 - 0.9 %    Immature Granulocytes Absolute, Automated 0.08 0.00 - 0.70 x10*3/uL   Hepatic Function Panel   Result Value Ref Range    Albumin 2.5 (L) 3.4 - 5.0 g/dL    Bilirubin, Total 0.2 0.0 - 1.2 mg/dL    Bilirubin, Direct 0.0 0.0 - 0.3 mg/dL    Alkaline Phosphatase 95 33 - 136 U/L    ALT 30 7 - 45 U/L    AST 24 9 - 39 U/L    Total Protein 4.4 (L) 6.4 - 8.2 g/dL   Phosphorus   Result Value Ref Range    Phosphorus 2.8 2.5 - 4.9 mg/dL   Basic Metabolic Panel   Result Value Ref Range     Glucose 141 (H) 74 - 99 mg/dL    Sodium 148 (H) 136 - 145 mmol/L    Potassium 4.0 3.5 - 5.3 mmol/L    Chloride 114 (H) 98 - 107 mmol/L    Bicarbonate 25 21 - 32 mmol/L    Anion Gap 13 10 - 20 mmol/L    Urea Nitrogen 24 (H) 6 - 23 mg/dL    Creatinine 1.43 (H) 0.50 - 1.05 mg/dL    eGFR 42 (L) >60 mL/min/1.73m*2    Calcium 7.9 (L) 8.6 - 10.6 mg/dL   Manual Differential   Result Value Ref Range    Neutrophils %, Manual 85.0 40.0 - 80.0 %    Bands %, Manual 5.3 0.0 - 5.0 %    Lymphocytes %, Manual 8.8 13.0 - 44.0 %    Monocytes %, Manual 0.9 2.0 - 10.0 %    Eosinophils %, Manual 0.0 0.0 - 6.0 %    Basophils %, Manual 0.0 0.0 - 2.0 %    Seg Neutrophils Absolute, Manual 4.17 1.20 - 7.00 x10*3/uL    Bands Absolute, Manual 0.26 0.00 - 0.70 x10*3/uL    Lymphocytes Absolute, Manual 0.43 (L) 1.20 - 4.80 x10*3/uL    Monocytes Absolute, Manual 0.04 (L) 0.10 - 1.00 x10*3/uL    Eosinophils Absolute, Manual 0.00 0.00 - 0.70 x10*3/uL    Basophils Absolute, Manual 0.00 0.00 - 0.10 x10*3/uL    Total Cells Counted 113     Neutrophils Absolute, Manual 4.43 1.20 - 7.70 x10*3/uL    RBC Morphology See Below     Polychromasia Mild     Target Cells Few     Ovalocytes Few     Stomatocytes Few    Blood Culture    Specimen: Peripheral Venipuncture; Blood culture   Result Value Ref Range    Blood Culture Loaded on Instrument - Culture in progress    Blood Culture    Specimen: Peripheral Venipuncture; Blood culture   Result Value Ref Range    Blood Culture Loaded on Instrument - Culture in progress    POCT GLUCOSE   Result Value Ref Range    POCT Glucose 93 74 - 99 mg/dL   Heparin Assay   Result Value Ref Range    Heparin Unfractionated 0.2 See Comment Below for Therapeutic Ranges IU/mL     XR chest 1 view    Result Date: 1/17/2024  Interpreted By:  Bland, Presley, STUDY: XR CHEST 1 VIEW; XR ABDOMEN 1 VIEW;  1/17/2024 11:59 am   INDICATION: Signs/Symptoms:hypoxia; Signs/Symptoms:Verification of corpac placement.   COMPARISON: Chest radiograph  01/14/2024 and chest, abdomen pelvis CT scan 01/14/2024   ACCESSION NUMBER(S): [id number]; [id number]   ORDERING CLINICIAN: WILBERTO DELA CRUZ   FINDINGS: Single AP radiograph of the chest. Single AP radiograph of the abdomen   Enteric tube is seen coursing below diaphragm and looping within upper abdomen and tip pointing towards left within expected location of gastric body. Right upper extremity PICC now seen in place with tip projecting over lower SVC/superior cavoatrial junction.   The cardiomediastinal silhouette is stable in size and configuration. Mild aortic knob calcifications. Poorly visualized coronary artery calcifications and stents.   Low lung volumes with bronchovascular crowding. New subtle patchy and hazy bibasilar airspace opacities may represent atelectasis/infiltrates. There is no sizable pleural effusion or pneumothorax.   Nonobstructive bowel gas pattern.Limited evaluation of pneumoperitoneum on semi upright imaging, however no gross evidence of free air is noted.   Osseous structures demonstrate no acute bony changes.       1.  Enteric tube is seen looping within upper abdomen tip pointing towards left within expected location of gastric body. 2. Nonobstructive bowel gas pattern. 3. New subtle patchy and hazy bibasilar airspace opacities may represent atelectasis/infiltrates. Also correlate clinically with concern for aspiration.   Signed by: Presley Bland 1/17/2024 12:16 PM Dictation workstation:   [id number]    XR abdomen 1 view    Result Date: 1/17/2024  Interpreted By:  Presley Bland, STUDY: XR CHEST 1 VIEW; XR ABDOMEN 1 VIEW;  1/17/2024 11:59 am   INDICATION: Signs/Symptoms:hypoxia; Signs/Symptoms:Verification of corpac placement.   COMPARISON: Chest radiograph 01/14/2024 and chest, abdomen pelvis CT scan 01/14/2024   ACCESSION NUMBER(S): [id number]; [id number]   ORDERING CLINICIAN: WILBERTO DELA CRUZ   FINDINGS: Single AP radiograph of the chest. Single AP  radiograph of the abdomen   Enteric tube is seen coursing below diaphragm and looping within upper abdomen and tip pointing towards left within expected location of gastric body. Right upper extremity PICC now seen in place with tip projecting over lower SVC/superior cavoatrial junction.   The cardiomediastinal silhouette is stable in size and configuration. Mild aortic knob calcifications. Poorly visualized coronary artery calcifications and stents.   Low lung volumes with bronchovascular crowding. New subtle patchy and hazy bibasilar airspace opacities may represent atelectasis/infiltrates. There is no sizable pleural effusion or pneumothorax.   Nonobstructive bowel gas pattern.Limited evaluation of pneumoperitoneum on semi upright imaging, however no gross evidence of free air is noted.   Osseous structures demonstrate no acute bony changes.       1.  Enteric tube is seen looping within upper abdomen tip pointing towards left within expected location of gastric body. 2. Nonobstructive bowel gas pattern. 3. New subtle patchy and hazy bibasilar airspace opacities may represent atelectasis/infiltrates. Also correlate clinically with concern for aspiration.   Signed by: Presley Bland 1/17/2024 12:16 PM Dictation workstation:   [id number]    XR abdomen 1 view    Result Date: 1/17/2024  Interpreted By:  Roni Dwyer, STUDY: XR ABDOMEN 1 VIEW;  1/17/2024 4:34 am   INDICATION: Signs/Symptoms:NG / Feeding Tube Placement Verification.   COMPARISON: None.   ACCESSION NUMBER(S): [id number]   ORDERING CLINICIAN: ELENA DAVALOS   FINDINGS: Feeding tube is coiled in the stomach, tube entering the distal stomach with the tip doubled back and terminating in the mid body. Nonobstructive bowel gas pattern.       Feeding tube coiled in the stomach.     Signed by: Roni Dwyer 1/17/2024 7:18 AM Dictation workstation:   [id number]    Bedside Midline Imaging    Result Date: 1/16/2024  These images are not reportable by radiology and  will not be interpreted by  Radiologists.    MR brain w and wo IV contrast    Result Date: 1/15/2024  Interpreted By:  Sanchez Sandoval, STUDY: MR BRAIN W AND WO IV CONTRAST;  1/15/2024 4:30 pm   INDICATION: Signs/Symptoms:encephalopathy, c/f neuro lupus vs cva vs other acute intracranial pathology.   COMPARISON: March 2023.   ACCESSION NUMBER(S): [id number]   ORDERING CLINICIAN: JAYLA SORIANO   TECHNIQUE: The brain was studied in the sagittal, axial and coronal planes utilizing FLAIR, T1 and T2 weighted images.     Following intravenous injection of gadolinium contrast, T1 weighted fat suppressed multiplanar images were also performed.   FINDINGS: There is a normal-size ventricular system.  There is no evidence of intracranial mass or extra-axial collection.  The skull base, paranasal sinuses and orbital structures are unremarkable. Diffusion weighted images and associated ADC maps of the brain were unremarkable.  There is no evidence of diffusion restriction to suggest the presence of acute infarction.   There is focal encephalomalacia in the left occipital region unchanged from the previous exam. Gradient echo T2 weighted images demonstrate hemosiderin deposition in the left occipital region.. Findings are consistent with remote infarction or contusion.   There are scattered foci of increased signal in the subcortical and periventricular white matter best appreciated on FLAIR images. These likely represent foci of demyelination of uncertain cause and significance and may be physiologic at this patient's stated age.       *There are patchy and confluence regions of abnormal signal in the periventricular white matter bilaterally. These are nonspecific findings consistent with demyelination due to ischemic, degenerative or primary demyelinating processes. *Unchanged previous infarction left occipital pole *There is no evidence of mass, iacute infarction or hemorrhage. * There is no measurable change compared to  the previous exam.   MACRO: none   Signed by: Sanchez Sandoval 1/15/2024 4:48 PM Dictation workstation:   [id number]     Scheduled medications  acyclovir, 10 mg/kg (Ideal), intravenous, q12h  ampicillin, 2 g, intravenous, q4h  cefTRIAXone, 2 g, intravenous, q12h  [START ON 1/18/2024] pantoprazole, 40 mg, oral, Daily before breakfast   Or  [START ON 1/18/2024] esomeprazole, 40 mg, nasoduodenal tube, Daily before breakfast   Or  [START ON 1/18/2024] pantoprazole, 40 mg, intravenous, Daily before breakfast  folic acid, 1 mg, oral, Daily  hydrocortisone sodium succinate, 75 mg, intravenous, q6h  insulin lispro, 0-10 Units, subcutaneous, q4h  levETIRAcetam, 500 mg, intravenous, q12h  vancomycin, 1,500 mg, intravenous, q24h         Assessment/Plan     Bing Holliday is a 63 y.o. female w/a history of SLE (dx ~ 35yrs ago, follows w/Dr. Castellanos) c/b pancytopenia, lupus cerebritis, lupus nephritis w/CSZ4w-0 (LN class V on renal bx 2011); osteoporosis w/spinal compression fx; COPD; adrenal insufficiency; afib on OAC; HTN; GERD, DM2; HFrEF; and pulmonary HTN who presented to [facility] 01/14 from SNF for confusion and weakness. Rheumatology is consulted for possible NPSLE.    This is a complex case given the patient's multitude of comorbidities and history of possible NPSLE. CNS involvement in SLE can result in altered mental status, psychosis, and seizures; however, it is difficult to make a definitive diagnosis. Most patients have serologically active SLE. MRI may be normal or may demonstrate non-specific white matter changes such as increased periventricular signaling. CSF analysis often demonstrates evidence of inflammation including pleocytosis, elevated total protein, the presence of oligoclonal bands, and an increased IgG index. It is important to acknowledge other potential etiologies including infection (particularly given the patient's immunocompromised status), adrenal insufficiency (note that she had refractory  hypoglycemia, hypotension, and hypernatremia at the time of admission), or primary demyelinating disease.    Cytopenias, including lymphopenia, can be a sign of active SLE; however, Ms. Holliday is pancytopenic at baseline so this is somewhat less useful for assessment of SLE disease activity currently. At this point in time we recommend obtaining further workup including dsDNA, complement levels, and ESR. Plan is for LP which will be helpful for diagnosis. In addition to usual labs and culture, recommend cell count and diff, total protein, IgG index, oligoclonal bands, anti-neuronal ab, and anti-ribosomal P ab. She is on high dose steroids currently for adrenal insufficiency so additional immunosuppression at this point in time is not recommended, especially until infection has been definitively ruled out.     Recommendations:  - Please check dsDNA, C3, C4, ESR  - LP for cell count/diff, total protein, oligoclonal bands, IgG index, anti-ribosomal P ab, anti-neuronal Ab  - Hydrocortisone as per endocrinology recommendations  - No further immunosuppression at this time    Rheumatology will continue to follow.    Please Epic secure chat myself or page [phone] with questions or concerns.    Patient will be discussed with attending Dr. Lang. Recommendations not final until her attestation.    Vivian Apple MD  PGY-V, Rheumatology

## 2024-01-17 NOTE — HOSPITAL COURSE
Bing Holliday is a 63 y.o. 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 Transferred to [facility] for inpatient rheum consult iso c/f lupus cerebritis vs meningitis vs adrenal insufficiency as etiology for encephalopathy.    Presented from SNF to [facility] ED on 1/14/2024 for complaints of confusion and weakness with upper and low back pain. In the nursing home, she had 2d hx of weakness, worsening confusion, hallucinations (speaking to people who were not there), inability to ambulate by self, reports of chills.  Tachycardic (114) and hypotensive (BP 84/69) in ED. Labs notable for mild hypoglycemia (66), mild HyperNa (148), BUN/Cr of 51/2.24, bsl Hgb 8.2, plts 101, trop negative, lactate 3.4, UA wo WBCs. Imaging notable for CTH wo acute ICH, CXR wo acute cardiopulm process, CT A/P w/ cardiomegaly w/ CAD, colonic diverticulosis wo acute diverticulitis, non-obstructing R sided nephrolithiasis. Initial EKG w/ AFib w/ RVR at 105. Interventions included D50 x25g, Vanc/Cefepime/Flagyl, 2L LR, 2g MgSulfate, 100mg Methylpred.      Admitted to hospital for AMS, metabolic disturbances c/f adrenal insufficiency d/t patient being on chronic steroids and possible medication non-adherence iso worsening confusion.  During hospital stay, patient was noted to vacillate between A&Ox0 to A&Ox2 (self and location) though status waxed/waned throughout day. Patient reported diffuse abd pain and b/l arm/leg pain to superficial palpation. B/l DVT U/S negative for DVT. Noted to have chronic sacral wound, pink, clean, intact, wo signs of infection. 1/15 MRI Brain w and w/o contrast demonstrated focal encephalomalacia in L occipital region unchanged from previous exam. Hemosiderin deposit in L occipital region consistent w/ remote infarction/contusion.  Scattered foci w/ increased signal in subcortical and periventricular white matter consistent w/ demyelination (new finding).      On 1/16 PM, patient had clinically deteriorated, AOx0, no longer nodding/shaking head to questions, diffusely sensitive to light palpation over whole body. Neuro c/s on 1/16 for continuous facial twitching while awake w/ rapid eye blinking, lip smacking, which was refractory to Ativan, who rec'd vEEG, keppra load and maintenance, Ativan, lumbar puncture, and transfer to ICU for closer monitoring. Continuous EEG demonstrated status epilepticus for which patient as tx w/ Keppra/Ativan. Neuro rec'd Neuro ICU for continuous vEEG and real time titration of medications/therapy. Transfer center call convened, and neuro ICU attending did not feel patient required neuro ICU; MICU attending was requested instead, accepted to MICU pending bed availability.      Patient Cellcept held on 1/16.  Despite worsening mentation, patient was not intubated as she is DNI. However, patient was empirically started on empiric meningitis/encephalitis coverage is immunosuppression: Ampicillin, CTX, Vancomycin, Acyclovir.     On arrival to [facility] MICU, rheumatology, ID, neurology, and endocrine consulted.  LP completed 1/18. DHT placed for poor mentation and hypernatremia. She remained stable in MICU, so was transferred to floor 1/19. Per endocrine, low concern for adrenal crisis causing acute presentation. She ws continued on SDS, which were titrated throughout admission. CSF glucose, protein, cultures and HSV PCR negative, so stopped empiric meningitis tx and acyclovir per ID. Video eeg stopped per neuro, as was no longer having epileptiform activity. Mentation improved on floor, so DHT was removed. Most likely cause of AMS at this point believed to be delirium s/t multiple admissions (since November) and seizures. Plan to continue keppra on DC. Transitioned to home PO prednisone 15 mg every day, apixaban 2.5 mg  BID, and Mycophenylate 1000 mg BID. On 1/23/26, developed R hand pain and swelling. CT revealed partial occlusion of basilic vein w/o DVT. Patient started on IV vancomycin for potential cellulitis w/ concern for thrombophlebitis. Swelling improved and patient transitioned to PO doxycycline on discharge. Discharged to [facility] on 1/27/24 w/ follow up with rheumatology referral in next month.      Follow-Up  -per neuro, AMS likely s/t delirium and seizures, continue keppra 500 BID  -Pt not normally on insulin for diabetes but requires it for hyperglycemia from her steroids. Need for insulin must be monitored by facility physician and can likely be discontinued after steroids.  -Rheumatology to taper steroids outpatient

## 2024-01-17 NOTE — CARE PLAN
Problem: Pain  Goal: My pain/discomfort is manageable  1/17/2024 1829 by Jovanni Hanna RN  Outcome: Progressing  1/17/2024 1828 by Jovanni Hanna RN  Outcome: Progressing     Problem: Safety  Goal: Patient will be injury free during hospitalization  1/17/2024 1829 by Jovanni Hanna RN  Outcome: Progressing  1/17/2024 1828 by Jovanni Hanna RN  Outcome: Progressing  Goal: I will remain free of falls  1/17/2024 1829 by Jovanni Hanna RN  Outcome: Progressing  1/17/2024 1828 by Jovanni Hanna RN  Outcome: Progressing     Problem: Daily Care  Goal: Daily care needs are met  1/17/2024 1829 by Jovanni Hanna RN  Outcome: Progressing  1/17/2024 1828 by Jovanni Hanna RN  Outcome: Progressing     Problem: Psychosocial Needs  Goal: Demonstrates ability to cope with hospitalization/illness  1/17/2024 1829 by Jovanni Hanna RN  Outcome: Progressing  1/17/2024 1828 by Jovanni Hanna RN  Outcome: Progressing  Goal: Collaborate with me, my family, and caregiver to identify my specific goals  1/17/2024 1829 by Jovanni Hanna RN  Outcome: Progressing  1/17/2024 1828 by Jovanni Hanna RN  Outcome: Progressing     Problem: Discharge Barriers  Goal: My discharge needs are met  1/17/2024 1829 by Jovanni Hanna RN  Outcome: Progressing  1/17/2024 1828 by Jovanni Hanna RN  Outcome: Progressing     Problem: Skin  Goal: Decreased wound size/increased tissue granulation at next dressing change  1/17/2024 1829 by Jovanni Hanna RN  Outcome: Progressing  1/17/2024 1828 by Jovanni Hanna RN  Outcome: Progressing  Goal: Participates in plan/prevention/treatment measures  1/17/2024 1829 by Jovanni Hanna RN  Outcome: Progressing  1/17/2024 1828 by Jovanni Hanna RN  Outcome: Progressing  Goal: Prevent/manage excess moisture  1/17/2024 1829 by Jovanni Hanna RN  Outcome: Progressing  1/17/202417/2024 1828 by Jovanni Hanna, RN  Outcome: Progressing  Goal: Prevent/minimize  sheer/friction injuries  1/17/2024 1829 by Jovanni Hanna RN  Outcome: Progressing  1/17/2024 1828 by Jovanni Hanna RN  Outcome: Progressing  Goal: Promote/optimize nutrition  1/17/2024 1829 by Jovanni Hanna, RN  Outcome: Progressing  1/17/2024 1828 by Jovanni Hanna, AMINAH  Outcome: Progressing  Goal: Promote skin healing  1/17/2024 1829 by Jovanni Hanna, AMINAH  Outcome: Progressing  1/17/2024 1828 by Jovanni Hanna RN  Outcome: Progressing     Problem: Fall/Injury  Goal: Not fall by end of shift  1/17/2024 1829 by Jovanni Hanna, AMINAH  Outcome: Progressing  1/17/2024 1828 by Jovanni Hanna RN  Outcome: Progressing  Goal: Be free from injury by end of the shift  1/17/2024 1829 by Jovanni Hanna, AMINAH  Outcome: Progressing  1/17/2024 1828 by Jovanni Hanna RN  Outcome: Progressing  Goal: Verbalize understanding of personal risk factors for fall in the hospital  1/17/2024 1829 by Jovanni Hanna, AMINAH  Outcome: Progressing  1/17/2024 1828 by Jovanni Hanna RN  Outcome: Progressing  Goal: Verbalize understanding of risk factor reduction measures to prevent injury from fall in the home  1/17/2024 1829 by Jovanni Hanna, AMINAH  Outcome: Progressing  1/17/2024 1828 by Jovanni Hanna RN  Outcome: Progressing  Goal: Use assistive devices by end of the shift  1/17/2024 1829 by Jovanni Hanna, AMINAH  Outcome: Progressing  1/17/2024 1828 by Jovanni Hanna RN  Outcome: Progressing  Goal: Pace activities to prevent fatigue by end of the shift  1/17/2024 1829 by Jovanni Hanna, AMINAH  Outcome: Progressing  1/17/2024 1828 by Jovanni Hanna RN  Outcome: Progressing     Problem: Pain  Goal: Takes deep breaths with improved pain control throughout the shift  1/17/2024 1829 by Jovanni Hanna, AMINAH  Outcome: Progressing  1/17/2024 1828 by Jovanni Hanna RN  Outcome: Progressing  Goal: Turns in bed with improved pain control throughout the shift  1/17/2024 1829 by Jovanni Hanna,  RN  Outcome: Progressing  1/17/2024 1828 by Jovanni Hanna RN  Outcome: Progressing  Goal: Free from opioid side effects throughout the shift  1/17/2024 1829 by Jovanni Hanna RN  Outcome: Progressing  1/17/2024 1828 by Jovanni Hanna RN  Outcome: Progressing  Goal: Free from acute confusion related to pain meds throughout the shift  1/17/2024 1829 by Jovanni Hanan RN  Outcome: Progressing  1/17/2024 1828 by Jovanni Hanna RN  Outcome: Progressing     Problem: Safety - Medical Restraint  Goal: Remains free of injury from restraints (Restraint for Interference with Medical Device)  Outcome: Progressing  Goal: Free from restraint(s) (Restraint for Interference with Medical Device)  Outcome: Progressing

## 2024-01-17 NOTE — CARE PLAN
Patient safe and free from injury. Pt tx to ICU this shift for concern for seizures. Pt noted to have a change in mental status, eye twitching, and facial twitching this shift. Pt placed on viedo EEG. LP pending at this time, delayed d/t eliquis administration this am. Pt started on antiepileptic medication per Neurology. Pt started on antivirals and ATB this shift. Montano placed for strict I/o in the setting of declining renal function and hypernatremia. VSS. Pt bradycardic when asleep with hr in the 40's. Pt transfused 1 unit PRBC for low HGB.  No s/s resp distress. VSS. Pt awaiting tx to [facility] pending bed availability. Will continue to monitor. Seizure precautions and C-diff precautions remain in place.

## 2024-01-17 NOTE — CARE PLAN
The patient's goals for the shift include patient bp will be stable and mental status improved    The clinical goals for the shift include see care plan    Over the shift, the patient did not make progress toward the following goals. Barriers to progression include acuteness of illness. Recommendations to address these barriers include continue POC.

## 2024-01-18 ENCOUNTER — APPOINTMENT (OUTPATIENT)
Dept: RADIOLOGY | Facility: HOSPITAL | Age: 64
DRG: 101 | End: 2024-01-18
Payer: MEDICARE

## 2024-01-18 ENCOUNTER — HOSPITAL ENCOUNTER (OUTPATIENT)
Dept: CARDIOLOGY | Facility: HOSPITAL | Age: 64
Discharge: HOME | End: 2024-01-18
Payer: MEDICARE

## 2024-01-18 ENCOUNTER — LAB REQUISITION (OUTPATIENT)
Dept: LAB | Facility: HOSPITAL | Age: 64
DRG: 101 | End: 2024-01-18
Payer: MEDICARE

## 2024-01-18 ENCOUNTER — APPOINTMENT (OUTPATIENT)
Dept: CARDIOLOGY | Facility: HOSPITAL | Age: 64
DRG: 101 | End: 2024-01-18
Payer: MEDICARE

## 2024-01-18 LAB
ALBUMIN SERPL BCP-MCNC: 2.4 G/DL (ref 3.4–5)
ALBUMIN SERPL BCP-MCNC: 2.5 G/DL (ref 3.4–5)
ALBUMIN SERPL BCP-MCNC: 2.7 G/DL (ref 3.4–5)
ALP SERPL-CCNC: 93 U/L (ref 33–136)
ALT SERPL W P-5'-P-CCNC: 30 U/L (ref 7–45)
ANION GAP BLDV CALCULATED.4IONS-SCNC: 8 MMOL/L (ref 10–25)
ANION GAP SERPL CALC-SCNC: 12 MMOL/L (ref 10–20)
ANION GAP SERPL CALC-SCNC: 13 MMOL/L (ref 10–20)
ANION GAP SERPL CALC-SCNC: 19 MMOL/L (ref 10–20)
APPEARANCE CSF: CLEAR
AST SERPL W P-5'-P-CCNC: 24 U/L (ref 9–39)
BACTERIA UR CULT: NO GROWTH
BASE EXCESS BLDV CALC-SCNC: 3.5 MMOL/L (ref -2–3)
BASOPHILS # BLD AUTO: 0 X10*3/UL (ref 0–0.1)
BASOPHILS NFR BLD AUTO: 0 %
BASOPHILS NFR CSF MANUAL: 0 %
BILIRUB DIRECT SERPL-MCNC: 0.1 MG/DL (ref 0–0.3)
BILIRUB SERPL-MCNC: 0.3 MG/DL (ref 0–1.2)
BLASTS CSF MANUAL: 0 %
BLOOD EXPIRATION DATE: NORMAL
BODY TEMPERATURE: 37 DEGREES CELSIUS
BUN SERPL-MCNC: 15 MG/DL (ref 6–23)
BUN SERPL-MCNC: 16 MG/DL (ref 6–23)
BUN SERPL-MCNC: 20 MG/DL (ref 6–23)
CA-I BLDV-SCNC: 1.13 MMOL/L (ref 1.1–1.33)
CALCIUM SERPL-MCNC: 7 MG/DL (ref 8.6–10.6)
CALCIUM SERPL-MCNC: 7.6 MG/DL (ref 8.6–10.6)
CALCIUM SERPL-MCNC: 8.1 MG/DL (ref 8.6–10.6)
CHLORIDE BLDV-SCNC: 114 MMOL/L (ref 98–107)
CHLORIDE SERPL-SCNC: 103 MMOL/L (ref 98–107)
CHLORIDE SERPL-SCNC: 113 MMOL/L (ref 98–107)
CHLORIDE SERPL-SCNC: 113 MMOL/L (ref 98–107)
CO2 SERPL-SCNC: 25 MMOL/L (ref 21–32)
CO2 SERPL-SCNC: 27 MMOL/L (ref 21–32)
CO2 SERPL-SCNC: 28 MMOL/L (ref 21–32)
COLOR CSF: COLORLESS
COLOR SPUN CSF: COLORLESS
CREAT SERPL-MCNC: 1.27 MG/DL (ref 0.5–1.05)
CREAT SERPL-MCNC: 1.28 MG/DL (ref 0.5–1.05)
CREAT SERPL-MCNC: 1.31 MG/DL (ref 0.5–1.05)
DISPENSE STATUS: NORMAL
EGFRCR SERPLBLD CKD-EPI 2021: 46 ML/MIN/1.73M*2
EGFRCR SERPLBLD CKD-EPI 2021: 47 ML/MIN/1.73M*2
EGFRCR SERPLBLD CKD-EPI 2021: 48 ML/MIN/1.73M*2
EOSINOPHIL # BLD AUTO: 0.01 X10*3/UL (ref 0–0.7)
EOSINOPHIL NFR BLD AUTO: 0.3 %
EOSINOPHIL NFR CSF MANUAL: 0 %
ERYTHROCYTE [DISTWIDTH] IN BLOOD BY AUTOMATED COUNT: 20.7 % (ref 11.5–14.5)
GLUCOSE BLD MANUAL STRIP-MCNC: 107 MG/DL (ref 74–99)
GLUCOSE BLD MANUAL STRIP-MCNC: 122 MG/DL (ref 74–99)
GLUCOSE BLD MANUAL STRIP-MCNC: 123 MG/DL (ref 74–99)
GLUCOSE BLD MANUAL STRIP-MCNC: 126 MG/DL (ref 74–99)
GLUCOSE BLD MANUAL STRIP-MCNC: 144 MG/DL (ref 74–99)
GLUCOSE BLD MANUAL STRIP-MCNC: 144 MG/DL (ref 74–99)
GLUCOSE BLD MANUAL STRIP-MCNC: 151 MG/DL (ref 74–99)
GLUCOSE BLD MANUAL STRIP-MCNC: 154 MG/DL (ref 74–99)
GLUCOSE BLD MANUAL STRIP-MCNC: 95 MG/DL (ref 74–99)
GLUCOSE BLDV-MCNC: 142 MG/DL (ref 74–99)
GLUCOSE CSF-MCNC: 83 MG/DL (ref 40–70)
GLUCOSE SERPL-MCNC: 111 MG/DL (ref 74–99)
GLUCOSE SERPL-MCNC: 662 MG/DL (ref 74–99)
GLUCOSE SERPL-MCNC: 93 MG/DL (ref 74–99)
HCO3 BLDV-SCNC: 27.8 MMOL/L (ref 22–26)
HCT VFR BLD AUTO: 29 % (ref 36–46)
HCT VFR BLD EST: 28 % (ref 36–46)
HGB BLD-MCNC: 9.4 G/DL (ref 12–16)
HGB BLDV-MCNC: 9.4 G/DL (ref 12–16)
HOLD SPECIMEN: NORMAL
HOLD SPECIMEN: NORMAL
IMM GRANULOCYTES # BLD AUTO: 0.15 X10*3/UL (ref 0–0.7)
IMM GRANULOCYTES NFR BLD AUTO: 3.9 % (ref 0–0.9)
IMM GRANULOCYTES NFR CSF: 0 %
INHALED O2 CONCENTRATION: 96 %
LACTATE BLDV-SCNC: 1 MMOL/L (ref 0.4–2)
LYMPHOCYTES # BLD AUTO: 0.49 X10*3/UL (ref 1.2–4.8)
LYMPHOCYTES NFR BLD AUTO: 12.6 %
LYMPHOCYTES NFR CSF MANUAL: 58 % (ref 28–96)
MAGNESIUM SERPL-MCNC: 1.58 MG/DL (ref 1.6–2.4)
MAGNESIUM SERPL-MCNC: 2.13 MG/DL (ref 1.6–2.4)
MCH RBC QN AUTO: 30.5 PG (ref 26–34)
MCHC RBC AUTO-ENTMCNC: 32.4 G/DL (ref 32–36)
MCV RBC AUTO: 94 FL (ref 80–100)
MONOCYTES # BLD AUTO: 0.25 X10*3/UL (ref 0.1–1)
MONOCYTES NFR BLD AUTO: 6.4 %
MONOS+MACROS NFR CSF MANUAL: 40 % (ref 16–56)
NEUTROPHILS # BLD AUTO: 2.98 X10*3/UL (ref 1.2–7.7)
NEUTROPHILS NFR BLD AUTO: 76.8 %
NEUTS SEG NFR CSF MANUAL: 2 % (ref 0–5)
NRBC BLD-RTO: 1.3 /100 WBCS (ref 0–0)
OTHER CELLS NFR CSF MANUAL: 0 %
OXYHGB MFR BLDV: 78.4 % (ref 45–75)
PCO2 BLDV: 40 MM HG (ref 41–51)
PH BLDV: 7.45 PH (ref 7.33–7.43)
PHOSPHATE SERPL-MCNC: 13.8 MG/DL (ref 2.5–4.9)
PHOSPHATE SERPL-MCNC: 2.3 MG/DL (ref 2.5–4.9)
PHOSPHATE SERPL-MCNC: 2.4 MG/DL (ref 2.5–4.9)
PLASMA CELLS NFR CSF MICRO: 0 %
PLATELET # BLD AUTO: 78 X10*3/UL (ref 150–450)
PO2 BLDV: 48 MM HG (ref 35–45)
POTASSIUM BLDV-SCNC: 3.4 MMOL/L (ref 3.5–5.3)
POTASSIUM SERPL-SCNC: 3.2 MMOL/L (ref 3.5–5.3)
POTASSIUM SERPL-SCNC: 3.4 MMOL/L (ref 3.5–5.3)
POTASSIUM SERPL-SCNC: 8.2 MMOL/L (ref 3.5–5.3)
PRODUCT BLOOD TYPE: 9500
PRODUCT CODE: NORMAL
PROT SERPL-MCNC: 4.7 G/DL (ref 6.4–8.2)
RBC # BLD AUTO: 3.08 X10*6/UL (ref 4–5.2)
RBC # CSF AUTO: 72 /UL (ref 0–5)
RBC MORPH BLD: NORMAL
SAO2 % BLDV: 79 % (ref 45–75)
SODIUM BLDV-SCNC: 146 MMOL/L (ref 136–145)
SODIUM SERPL-SCNC: 139 MMOL/L (ref 136–145)
SODIUM SERPL-SCNC: 150 MMOL/L (ref 136–145)
SODIUM SERPL-SCNC: 150 MMOL/L (ref 136–145)
TOTAL CELLS COUNTED CSF: 100
TUBE # CSF: ABNORMAL
UNIT ABO: NORMAL
UNIT NUMBER: NORMAL
UNIT RH: NORMAL
UNIT VOLUME: 287
VANCOMYCIN SERPL-MCNC: 29.1 UG/ML (ref 5–20)
WBC # BLD AUTO: 3.9 X10*3/UL (ref 4.4–11.3)
WBC # CSF AUTO: 4 /UL (ref 1–5)
XM INTEP: NORMAL

## 2024-01-18 PROCEDURE — 84132 ASSAY OF SERUM POTASSIUM: CPT

## 2024-01-18 PROCEDURE — 86403 PARTICLE AGGLUT ANTBDY SCRN: CPT

## 2024-01-18 PROCEDURE — 88104 CYTOPATH FL NONGYN SMEARS: CPT

## 2024-01-18 PROCEDURE — 93005 ELECTROCARDIOGRAM TRACING: CPT

## 2024-01-18 PROCEDURE — 62328 DX LMBR SPI PNXR W/FLUOR/CT: CPT

## 2024-01-18 PROCEDURE — 2500000004 HC RX 250 GENERAL PHARMACY W/ HCPCS (ALT 636 FOR OP/ED)

## 2024-01-18 PROCEDURE — 82947 ASSAY GLUCOSE BLOOD QUANT: CPT

## 2024-01-18 PROCEDURE — 93010 ELECTROCARDIOGRAM REPORT: CPT | Performed by: INTERNAL MEDICINE

## 2024-01-18 PROCEDURE — 2500000005 HC RX 250 GENERAL PHARMACY W/O HCPCS: Performed by: STUDENT IN AN ORGANIZED HEALTH CARE EDUCATION/TRAINING PROGRAM

## 2024-01-18 PROCEDURE — 86255 FLUORESCENT ANTIBODY SCREEN: CPT

## 2024-01-18 PROCEDURE — 62328 DX LMBR SPI PNXR W/FLUOR/CT: CPT | Performed by: RADIOLOGY

## 2024-01-18 PROCEDURE — 80202 ASSAY OF VANCOMYCIN: CPT

## 2024-01-18 PROCEDURE — B01B1ZZ FLUOROSCOPY OF SPINAL CORD USING LOW OSMOLAR CONTRAST: ICD-10-PCS | Performed by: RADIOLOGY

## 2024-01-18 PROCEDURE — 83735 ASSAY OF MAGNESIUM: CPT

## 2024-01-18 PROCEDURE — 99232 SBSQ HOSP IP/OBS MODERATE 35: CPT | Performed by: INTERNAL MEDICINE

## 2024-01-18 PROCEDURE — 82248 BILIRUBIN DIRECT: CPT

## 2024-01-18 PROCEDURE — 80069 RENAL FUNCTION PANEL: CPT | Mod: CCI,MUE

## 2024-01-18 PROCEDURE — 99291 CRITICAL CARE FIRST HOUR: CPT

## 2024-01-18 PROCEDURE — 2500000005 HC RX 250 GENERAL PHARMACY W/O HCPCS

## 2024-01-18 PROCEDURE — 80069 RENAL FUNCTION PANEL: CPT

## 2024-01-18 PROCEDURE — 85025 COMPLETE CBC W/AUTO DIFF WBC: CPT

## 2024-01-18 PROCEDURE — 87483 CNS DNA AMP PROBE TYPE 12-25: CPT

## 2024-01-18 PROCEDURE — 37799 UNLISTED PX VASCULAR SURGERY: CPT

## 2024-01-18 PROCEDURE — 89051 BODY FLUID CELL COUNT: CPT

## 2024-01-18 PROCEDURE — 80069 RENAL FUNCTION PANEL: CPT | Mod: CCI

## 2024-01-18 PROCEDURE — 82945 GLUCOSE OTHER FLUID: CPT

## 2024-01-18 PROCEDURE — 2500000001 HC RX 250 WO HCPCS SELF ADMINISTERED DRUGS (ALT 637 FOR MEDICARE OP): Performed by: STUDENT IN AN ORGANIZED HEALTH CARE EDUCATION/TRAINING PROGRAM

## 2024-01-18 PROCEDURE — 2500000004 HC RX 250 GENERAL PHARMACY W/ HCPCS (ALT 636 FOR OP/ED): Performed by: STUDENT IN AN ORGANIZED HEALTH CARE EDUCATION/TRAINING PROGRAM

## 2024-01-18 PROCEDURE — 2500000001 HC RX 250 WO HCPCS SELF ADMINISTERED DRUGS (ALT 637 FOR MEDICARE OP)

## 2024-01-18 PROCEDURE — 87070 CULTURE OTHR SPECIMN AEROBIC: CPT

## 2024-01-18 PROCEDURE — 95715 VEEG EA 12-26HR INTMT MNTR: CPT

## 2024-01-18 PROCEDURE — 95720 EEG PHY/QHP EA INCR W/VEEG: CPT | Performed by: PSYCHIATRY & NEUROLOGY

## 2024-01-18 PROCEDURE — 84182 PROTEIN WESTERN BLOT TEST: CPT

## 2024-01-18 PROCEDURE — A4217 STERILE WATER/SALINE, 500 ML: HCPCS

## 2024-01-18 PROCEDURE — 82040 ASSAY OF SERUM ALBUMIN: CPT

## 2024-01-18 PROCEDURE — 36415 COLL VENOUS BLD VENIPUNCTURE: CPT

## 2024-01-18 PROCEDURE — C9113 INJ PANTOPRAZOLE SODIUM, VIA: HCPCS

## 2024-01-18 PROCEDURE — 84157 ASSAY OF PROTEIN OTHER: CPT

## 2024-01-18 PROCEDURE — 99232 SBSQ HOSP IP/OBS MODERATE 35: CPT

## 2024-01-18 PROCEDURE — 009U3ZX DRAINAGE OF SPINAL CANAL, PERCUTANEOUS APPROACH, DIAGNOSTIC: ICD-10-PCS | Performed by: RADIOLOGY

## 2024-01-18 PROCEDURE — 1200000002 HC GENERAL ROOM WITH TELEMETRY DAILY

## 2024-01-18 PROCEDURE — 87529 HSV DNA AMP PROBE: CPT

## 2024-01-18 PROCEDURE — 87102 FUNGUS ISOLATION CULTURE: CPT

## 2024-01-18 RX ORDER — CALCIUM GLUCONATE 20 MG/ML
2 INJECTION, SOLUTION INTRAVENOUS ONCE
Status: DISCONTINUED | OUTPATIENT
Start: 2024-01-18 | End: 2024-01-18

## 2024-01-18 RX ORDER — POTASSIUM CHLORIDE 1.5 G/1.58G
20 POWDER, FOR SOLUTION ORAL ONCE
Status: COMPLETED | OUTPATIENT
Start: 2024-01-18 | End: 2024-01-18

## 2024-01-18 RX ORDER — MAGNESIUM SULFATE HEPTAHYDRATE 40 MG/ML
2 INJECTION, SOLUTION INTRAVENOUS ONCE
Status: COMPLETED | OUTPATIENT
Start: 2024-01-18 | End: 2024-01-19

## 2024-01-18 RX ORDER — LANOLIN ALCOHOL/MO/W.PET/CERES
100 CREAM (GRAM) TOPICAL DAILY
Status: DISCONTINUED | OUTPATIENT
Start: 2024-01-18 | End: 2024-01-28 | Stop reason: HOSPADM

## 2024-01-18 RX ORDER — HEPARIN SODIUM 5000 [USP'U]/ML
5000 INJECTION, SOLUTION INTRAVENOUS; SUBCUTANEOUS EVERY 8 HOURS
Status: DISCONTINUED | OUTPATIENT
Start: 2024-01-18 | End: 2024-01-26

## 2024-01-18 RX ORDER — DEXTROSE MONOHYDRATE 100 MG/ML
50 INJECTION, SOLUTION INTRAVENOUS CONTINUOUS
Status: DISCONTINUED | OUTPATIENT
Start: 2024-01-18 | End: 2024-01-18

## 2024-01-18 RX ORDER — DEXTROSE 50 % IN WATER (D50W) INTRAVENOUS SYRINGE
25 ONCE
Status: DISCONTINUED | OUTPATIENT
Start: 2024-01-18 | End: 2024-01-18

## 2024-01-18 RX ORDER — DEXTROSE MONOHYDRATE AND SODIUM CHLORIDE 5; .45 G/100ML; G/100ML
100 INJECTION, SOLUTION INTRAVENOUS CONTINUOUS
Status: DISCONTINUED | OUTPATIENT
Start: 2024-01-18 | End: 2024-01-18

## 2024-01-18 RX ORDER — DEXTROSE MONOHYDRATE 50 MG/ML
100 INJECTION, SOLUTION INTRAVENOUS CONTINUOUS
Status: DISCONTINUED | OUTPATIENT
Start: 2024-01-19 | End: 2024-01-19

## 2024-01-18 RX ORDER — LIDOCAINE HYDROCHLORIDE 10 MG/ML
5 INJECTION, SOLUTION EPIDURAL; INFILTRATION; INTRACAUDAL; PERINEURAL ONCE
Status: DISCONTINUED | OUTPATIENT
Start: 2024-01-18 | End: 2024-01-18

## 2024-01-18 RX ORDER — MAGNESIUM SULFATE HEPTAHYDRATE 40 MG/ML
2 INJECTION, SOLUTION INTRAVENOUS ONCE
Status: COMPLETED | OUTPATIENT
Start: 2024-01-18 | End: 2024-01-18

## 2024-01-18 RX ADMIN — VANCOMYCIN HYDROCHLORIDE 125 MG: KIT at 09:01

## 2024-01-18 RX ADMIN — CEFTRIAXONE SODIUM 2 G: 2 INJECTION, SOLUTION INTRAVENOUS at 22:35

## 2024-01-18 RX ADMIN — DEXTROSE AND SODIUM CHLORIDE 100 ML/HR: 5; 450 INJECTION, SOLUTION INTRAVENOUS at 09:24

## 2024-01-18 RX ADMIN — AMPICILLIN SODIUM 2 G: 2 INJECTION, POWDER, FOR SOLUTION INTRAVENOUS at 02:47

## 2024-01-18 RX ADMIN — HEPARIN SODIUM 5000 UNITS: 5000 INJECTION INTRAVENOUS; SUBCUTANEOUS at 17:38

## 2024-01-18 RX ADMIN — POTASSIUM PHOSPHATE, MONOBASIC POTASSIUM PHOSPHATE, DIBASIC 21 MMOL: 224; 236 INJECTION, SOLUTION, CONCENTRATE INTRAVENOUS at 09:01

## 2024-01-18 RX ADMIN — ACYCLOVIR SODIUM 650 MG: 50 INJECTION, SOLUTION INTRAVENOUS at 05:09

## 2024-01-18 RX ADMIN — FOLIC ACID 1 MG: 1 TABLET ORAL at 09:01

## 2024-01-18 RX ADMIN — AMPICILLIN SODIUM 2 G: 2 INJECTION, POWDER, FOR SOLUTION INTRAVENOUS at 23:42

## 2024-01-18 RX ADMIN — HYDROCORTISONE SODIUM SUCCINATE 25 MG: 100 INJECTION, POWDER, FOR SOLUTION INTRAMUSCULAR; INTRAVENOUS at 23:53

## 2024-01-18 RX ADMIN — LEVETIRACETAM 500 MG: 5 INJECTION INTRAVENOUS at 23:30

## 2024-01-18 RX ADMIN — ACYCLOVIR SODIUM 650 MG: 50 INJECTION, SOLUTION INTRAVENOUS at 14:24

## 2024-01-18 RX ADMIN — VANCOMYCIN HYDROCHLORIDE 125 MG: KIT at 02:47

## 2024-01-18 RX ADMIN — CEFTRIAXONE SODIUM 2 G: 2 INJECTION, SOLUTION INTRAVENOUS at 09:01

## 2024-01-18 RX ADMIN — POTASSIUM CHLORIDE 20 MEQ: 1.5 POWDER, FOR SOLUTION ORAL at 06:24

## 2024-01-18 RX ADMIN — VANCOMYCIN HYDROCHLORIDE 1500 MG: 5 INJECTION, POWDER, LYOPHILIZED, FOR SOLUTION INTRAVENOUS at 22:41

## 2024-01-18 RX ADMIN — VANCOMYCIN HYDROCHLORIDE 125 MG: KIT at 22:56

## 2024-01-18 RX ADMIN — AMPICILLIN SODIUM 2 G: 2 INJECTION, POWDER, FOR SOLUTION INTRAVENOUS at 13:41

## 2024-01-18 RX ADMIN — VANCOMYCIN HYDROCHLORIDE 125 MG: KIT at 15:34

## 2024-01-18 RX ADMIN — HYDROCORTISONE SODIUM SUCCINATE 25 MG: 100 INJECTION, POWDER, FOR SOLUTION INTRAMUSCULAR; INTRAVENOUS at 14:08

## 2024-01-18 RX ADMIN — MAGNESIUM SULFATE HEPTAHYDRATE 2 G: 40 INJECTION, SOLUTION INTRAVENOUS at 01:23

## 2024-01-18 RX ADMIN — PANTOPRAZOLE SODIUM 40 MG: 40 INJECTION, POWDER, FOR SOLUTION INTRAVENOUS at 06:25

## 2024-01-18 RX ADMIN — AMPICILLIN SODIUM 2 G: 2 INJECTION, POWDER, FOR SOLUTION INTRAVENOUS at 20:16

## 2024-01-18 RX ADMIN — HYDROCORTISONE SODIUM SUCCINATE 25 MG: 100 INJECTION, POWDER, FOR SOLUTION INTRAMUSCULAR; INTRAVENOUS at 05:09

## 2024-01-18 RX ADMIN — LEVETIRACETAM 500 MG: 5 INJECTION INTRAVENOUS at 13:41

## 2024-01-18 RX ADMIN — AMPICILLIN SODIUM 2 G: 2 INJECTION, POWDER, FOR SOLUTION INTRAVENOUS at 06:25

## 2024-01-18 RX ADMIN — HYDROCORTISONE SODIUM SUCCINATE 25 MG: 100 INJECTION, POWDER, FOR SOLUTION INTRAMUSCULAR; INTRAVENOUS at 00:28

## 2024-01-18 RX ADMIN — THIAMINE HCL TAB 100 MG 100 MG: 100 TAB at 15:34

## 2024-01-18 RX ADMIN — ACYCLOVIR SODIUM 650 MG: 50 INJECTION, SOLUTION INTRAVENOUS at 22:43

## 2024-01-18 RX ADMIN — AMPICILLIN SODIUM 2 G: 2 INJECTION, POWDER, FOR SOLUTION INTRAVENOUS at 15:33

## 2024-01-18 ASSESSMENT — ENCOUNTER SYMPTOMS
NECK STIFFNESS: 0
DIZZINESS: 0
NECK PAIN: 0
SHORTNESS OF BREATH: 0
HEADACHES: 0
ABDOMINAL PAIN: 0
PALPITATIONS: 0

## 2024-01-18 ASSESSMENT — PAIN SCALES - GENERAL: PAINLEVEL_OUTOF10: 0 - NO PAIN

## 2024-01-18 ASSESSMENT — PAIN - FUNCTIONAL ASSESSMENT: PAIN_FUNCTIONAL_ASSESSMENT: 0-10

## 2024-01-18 NOTE — PROGRESS NOTES
"Bing Holliday is a 63 y.o. female on day 1 of admission presenting with Meningitis.    Overnight  - NGT placed, started TF, FWF 500q8, PO vanc  - unable to do LP. NPO at midnight, No DVT ppx, IR consult put in   - collateral from family, \"confused for last year but much better than current state, reports med adherence  - additional chart review showed hx of lupus psychosis (2022) - similar presentation, LP was no completed, multiple positive C diff toxins with pulse do, from endocrine note 11/22/2023 questionable compliance to prednisone    Subjective   As per family, patient was confused for past year. Other doctors attributed this to \"lupus.\" She was profoundly weak leading up to current admission, unable to walk. As per family, she is different from baseline.        Review of Systems   Respiratory:  Negative for shortness of breath.    Cardiovascular:  Negative for chest pain and palpitations.   Gastrointestinal:  Negative for abdominal pain.   Musculoskeletal:  Negative for neck pain and neck stiffness.   Neurological:  Negative for dizziness and headaches.        Objective     Last Recorded Vitals  /76   Pulse 62   Temp 35.2 °C (95.4 °F) (Temporal)   Resp 17   Wt 99.3 kg (218 lb 14.7 oz)   SpO2 96%   Intake/Output last 3 Shifts:    Intake/Output Summary (Last 24 hours) at 1/18/2024 0657  Last data filed at 1/18/2024 0625  Gross per 24 hour   Intake 2508 ml   Output 2650 ml   Net -142 ml       Admission Weight  Weight: 99.3 kg (218 lb 14.7 oz) (01/17/24 1000)    Daily Weight  01/17/24 : 99.3 kg (218 lb 14.7 oz)      Physical Exam  Constitutional:       Appearance: She is ill-appearing.   Cardiovascular:      Rate and Rhythm: Regular rhythm. Bradycardia present.      Pulses: Normal pulses.      Heart sounds: Normal heart sounds.   Pulmonary:      Effort: Pulmonary effort is normal.      Breath sounds: Normal breath sounds.   Abdominal:      General: Abdomen is flat. There is no distension.      " Palpations: Abdomen is soft.      Tenderness: There is no abdominal tenderness.   Musculoskeletal:         General: Deformity (bilateral hammer toes) present.   Skin:     General: Skin is warm.      Capillary Refill: Capillary refill takes less than 2 seconds.      Coloration: Skin is not jaundiced.   Neurological:      Mental Status: She is disoriented.      Comments: Answers questions related to her symptoms but she is aaox1 (self)   Psychiatric:         Attention and Perception: She perceives visual hallucinations.       Relevant Results    Lab Results   Component Value Date    WBC 3.9 (L) 01/18/2024    HGB 9.4 (L) 01/18/2024    HCT 29.0 (L) 01/18/2024    MCV 94 01/18/2024    PLT 78 (L) 01/18/2024        Hemoglobin   Date Value Ref Range Status   01/18/2024 9.4 (L) 12.0 - 16.0 g/dL Final   01/17/2024 9.2 (L) 12.0 - 16.0 g/dL Final   01/17/2024 6.9 (L) 12.0 - 16.0 g/dL Final   01/17/2024 7.0 (L) 12.0 - 16.0 g/dL Final   01/16/2024 7.4 (L) 12.0 - 16.0 g/dL Final        Lab Results   Component Value Date    CREATININE 1.31 (H) 01/18/2024    BUN 20 01/18/2024     (H) 01/18/2024    K 3.4 (L) 01/18/2024     (H) 01/18/2024    CO2 27 01/18/2024       Glucose   Date Value Ref Range Status   01/18/2024 93 74 - 99 mg/dL Final       Creatinine   Date Value Ref Range Status   01/18/2024 1.31 (H) 0.50 - 1.05 mg/dL Final   01/17/2024 1.39 (H) 0.50 - 1.05 mg/dL Final   01/17/2024 1.43 (H) 0.50 - 1.05 mg/dL Final   01/17/2024 1.48 (H) 0.50 - 1.05 mg/dL Final   01/16/2024 1.56 (H) 0.50 - 1.05 mg/dL Final       Lab Results   Component Value Date    CALCIUM 8.1 (L) 01/18/2024    PHOS 2.4 (L) 01/18/2024       Magnesium   Date Value Ref Range Status   01/18/2024 2.13 1.60 - 2.40 mg/dL Final       Lab Results   Component Value Date    ALT 30 01/18/2024    AST 24 01/18/2024    GGT 63 (H) 01/07/2021    ALKPHOS 93 01/18/2024    BILITOT 0.3 01/18/2024       Lab Results   Component Value Date    INR 0.9 01/17/2024    INR 1.0  01/16/2024    INR 1.1 12/19/2023    PROTIME 10.4 01/17/2024    PROTIME 11.0 01/16/2024    PROTIME 12.5 12/19/2023     Sedimentation Rate   Date Value Ref Range Status   01/17/2024 17 0 - 30 mm/h Final   01/15/2024 34 (H) 0 - 30 mm/h Final         Anti DS DNA <1    Latest Reference Range & Units 01/17/24 18:15   Anti-RAMEZ-1 IgG <1.0 AI <0.2   Anti-SM <1.0 AI 0.3   Anti-SCL-70 <1.0 AI <0.2   Anti-SM/RNP <1.0 AI >8.0 (H)   Anti-RNP <1.0 AI 0.5   Anti-SSA <1.0 AI 0.5   Anti-SSB <1.0 AI <0.2   ANTI-RIBOSOMAL P <1.0 AI <0.2   Anti-Centromere <1.0 AI <0.2   Anti-Chromatin <1.0 AI 2.5 (H)   Anti-DNA (DS) <5.0 IU/mL <1.0   (H): Data is abnormally high   Latest Reference Range & Units 01/17/24 00:22 01/17/24 18:15   C3 Complement 87 - 200 mg/dL  108   C4 Complement 10 - 50 mg/dL  54 (H)   IgG 700 - 1,600 mg/dL 474 (L)        Susceptibility data from last 90 days.  Collected Specimen Info Organism Amoxicillin/Clavulanate Ampicillin Ampicillin/Sulbactam Cefazolin Cefotaxime Ceftriaxone Cefuroxime Ciprofloxacin Gentamicin Levofloxacin Piperacillin/Tazobactam Trimethoprim/Sulfamethoxazole   12/14/23 Urine from Clean Catch/Voided Serratia marcescens group (1) R R R R  S  S S  S S     Serratia marcescens group (2) R R R R  S R S S S S S   11/20/23 Urine from Clean Catch/Voided Serratia marcescens group R R R R R S  S S  S S     C. difficile, PCR   Date/Time Value Ref Range Status   01/17/2024 12:29 PM Detected (A) Not Detected Final     C. difficile (Toxin A/B)   Date/Time Value Ref Range Status   01/17/2024 12:29 PM Negative Negative, Indeterminate Final        Image Results  === Results for orders placed during the hospital encounter of 01/17/24 ===    XR abdomen 1 view [[id number]] 01/17/2024    Status: Normal  1.  Retracted enteric tube which now projects over the gastric  antrum/proximal duodenum.  2. The bowel gas pattern is nonobstructive.    I personally reviewed the images/study and I agree with the findings  as stated by  Resident Leobardo Kearns MD. This study was interpreted  at [facility], [place], [place].    MACRO:  None    Signed by: Sven Hahn 1/18/2024 6:54 AM  Dictation workstation:   [id number]    ___________________________________________________________________________    XR abdomen 1 view [[id number]] 01/17/2024    Status: Normal  1.  As described above    Signed by: Sven Hahn 1/18/2024 6:53 AM  Dictation workstation:   [id number]    ___________________________________________________________________________    XR abdomen 1 view [[id number]] 01/17/2024    Status: Normal  1.  Enteric tube is seen looping within upper abdomen tip pointing  towards left within expected location of gastric body.  2. Nonobstructive bowel gas pattern.  3. New subtle patchy and hazy bibasilar airspace opacities may  represent atelectasis/infiltrates. Also correlate clinically with  concern for aspiration.    Signed by: Presley Bland 1/17/2024 12:16 PM  Dictation workstation:   [id number]    ___________________________________________________________________________    XR chest 1 view [[id number]] 01/17/2024    Status: Normal  1.  Enteric tube is seen looping within upper abdomen tip pointing  towards left within expected location of gastric body.  2. Nonobstructive bowel gas pattern.  3. New subtle patchy and hazy bibasilar airspace opacities may  represent atelectasis/infiltrates. Also correlate clinically with  concern for aspiration.    Signed by: Presley Bland 1/17/2024 12:16 PM  Dictation workstation:   [id number]      === Results for orders placed during the hospital encounter of 01/14/24 ===    Vascular US Lower Extremity Venous Duplex Bilateral [VAS45] 01/15/2024    Status: Normal    ___________________________________________________________________________    EEG [NEU4] 01/17/2024    Status: Normal  IMPRESSION    Impression This vEEG is indicative of a moderate to severe diffuse  encephalopathy. No epileptiform discharges or seizures are seen. The patient had a non-epileptic left arm jerking without EEG correlation on 17:34 Alden 15.  A full report will be scanned into the patient's chart at a later time.      This report has been interpreted and electronically signed by    ___________________________________________________________________________    ECG 12 lead [ECG1] 01/14/2024    Status: Normal    ___________________________________________________________________________    ECG 12 lead [ECG1] 01/14/2024    Status: Normal    ___________________________________________________________________________    XR abdomen 1 view [[id number]] 01/17/2024    Status: Normal  Feeding tube coiled in the stomach.      Signed by: Roni Dwyer 1/17/2024 7:18 AM  Dictation workstation:   [id number]    ___________________________________________________________________________    Bedside Midline Imaging [[id number]] 01/16/2024    Status: Normal                   Assessment/Plan   This patient currently has cardiac telemetry ordered; if you would like to modify or discontinue the telemetry order, click here to go to the orders activity to modify/discontinue the order.    This patient has a central line   Reason for the central line remaining today? Parenteral medication    This patient has a urinary catheter   Reason for the urinary catheter remaining today? critically ill patient who need accurate urinary output measurements          Principal Problem:    Meningitis    Bing Holliday is a 63 y.o. 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 initially c/f adrenal crisis from sepsis, but now transferred for inpatient rheum consult iso c/f lupus cerebritis vs meningitis vs adrenal insufficiency as  etiology for encephalopathy.     Neuro:  #c/f lupus cerebritis  -see rheumatology section below.   -LP for cell count/diff, total protein, oligoclonal bands, IgG index, anti-ribosomal P ab, anti-neuronal Ab      #seizure?  - c/w Keppra 500 BID   - cvEEG   - Ativan 2 mg IV for prolonged motor seizure lasting more than 3 minutes or a cluster of 3 or more motor seizures in an 8 hour period.     #empiric coverage of meningitis  -immunocompromised on cellcept on prednisone  -pancytopenic at baseline, on admission was tachycardic and hypotensive with lactate 3.4   Plan:   -ampicillin 2 g q24hr  -rocephin 2g BID  -vancomycin   -IV acyclovir 10mg/kg bid   -LP (cell count tube 1 and tube 4, protein, glucose, meningitis panel, IgG, oligoclonal bands, HSV)   -neuro following        Cardiovascular:  #Paroxysmal Atrial fibrillation on eliquis  -Chadsvasc 4  -DOAC held 1/15 in anticipation of LP  -Will consider prophylactic heparin after LP     #Sinus bradycardia  - Hemodynamically stable  - Will consider atropine if patient becomes unstable     #CAD s/p CABG 2013  -statin     Respiratory:  - No active issues     GI:  #c diff, recurrent episode?  (tx'ed w/PO vanc pulse dose in 3/2023 with taper, metronidazole 2/2023)  - as per chart review, has chronic diarrhea with IBS-type picture?  - diarrhea with positive PCR positive, EIA negative 1/18  - PCR was positive 5/2022, 10/2022, 2/2023. Toxins were negative each of these times  - 1/17 started oral vanomycin via NGT  - ID following      Renal/:  #JED on CKD 3  - Baseline appears to be to be between 1.6 and 1.8  - Improving since admission with antibiotics and fluids     #hypernatremia 148  -likely due to NPO status  -based on ideal Na of 140-145, will need 1-2.4 L   -will give 100 ml/hr of d5w 1/2 NS for 6 hours while TF are held (LP)  -pm RFP      Heme/Onc:  #Pancytopenia  - May be related to lupus  - Has required 2 units of packed red blood cells on 1/16 1/17  - Hemoglobin goal  greater than 7, platelets greater than 10, if bleeding platelets greater than 50.  - See below     Endo:  #adrenal insufficiency from exogenous steroid use  -on chronic prednisone (10mg + 5mg)   -Low concern for adrenal crisis  -stress dose hydrocortisone 25mg q6hrs ([id number] on 1/14) in light of presumed infection  -endocrine following     ID:  #Concern for meningitis  - antibiotic coverage as above     MSK/Rheum:  #SLE  #Concern for lupus cerebritis  - Holding MMF in setting of presumed infection  - Continue on stress dose steroids as above  - Will check complement levels, antidouble-stranded DNA, KATHI panel, MONTSERRAT panel.  - LP for cell count/diff, total protein, oligoclonal bands, IgG index, anti-ribosomal P ab, anti-neuronal Ab   - Rheumatology is following.  Further recommendations based on results of LP     Derm:  - No active issues     Psych:  - No active issues     F: Replete PRN  E: Replete PRN  N: N.p.o., will do tube feeds after procedure   DVT Ppx: Defer until after procedure  GI Ppx: Esomeprazole  Access/Lines: 2 PIV, midline 1/16/ Montano 1/16  Abx: IV Vancomycin, PO vancomycin, ampicillin, Rocephin, acyclovir  O2: None      Pain regimen: Tylenol prn   GI Laxative: Holding as patient has a diarrhea since antibiotic initiation     Code status: DNR and No Intubation  Emergency contact: López Lang (son) [phone] (Home Phone)                 Renan Lemos MD

## 2024-01-18 NOTE — PROGRESS NOTES
Subjective:  Patient denies any HA, fevers, chills, N/V, diarrhea, or dysuria. Per nurse, patient had 1 BM overnight (not diarrhea) and has not had any today. LP w/ IR to be done today.    Oral vanc started 1/17 for c diff PCR pos (toxin neg) and diarrhea.    Objective:  Labs:     WBC 3.9 (stable) (1/18)  Na 150 (1/18)   Bun/Cr 20/1.31 (1/18)  ESR 17 (1/17)   UA (1/17) 1+ bacteria, trace leuk esterase  Urine cx (1/17) in progress  MRSA nares 1/16 - in process  Blood cx 1/14- no growth at 2 days  Blood cx 1/17- in process  C diff PCR positive, toxin negative  (1/17)    Physical Exam  Vitals  T 35-35.4  P 43-81  R 11-20  /119 - 160/106  SpO2     General: sitting up in bed, in no acute distress, occasionally staring off   Neuro: oriented to person but not place or time; follows simple commands (toe wiggling)  HEENT: no neck stiffness; moist mucus membranes; EEG leads in place  Pulm: clear breath sounds bilaterally, breathing comfortably on room air  CV: RRR  Abdomen: soft, non tender  Ext: warm, SCDs in place  Psych: possible visual hallucinations (endorsed seeing 5 people in the room when there were only 2)    Impression:  63-year-old female with SLE on prednisone and cellcept, c/b cerebritis, nephritis w/ baseline Cr 1.9, secondary adrenal insufficiency, pancytopenia, and Jaccoud arthropathy who initially presented to [facility] on 1/14/24 for confusion and weakness I/s/o diarrheal illness and was transferred to [facility] [place], [place] for management of suspected status epilepticus and possible CNS infection. Lack of fever and meningeal signs and overall subacute onset of symptoms argue against bacterial meningitis. Overall clinical picture is more suggestive of encephalitis given her altered mental status. HSV encephalitis is possible given concern for new seizures and her immunocompromised status. Other viral causes of encephalitis are also possible in addition to fungal causes given immunocompromised status. Other  differentials include neuropsychiatric manifestions of lupus, especially with history of recent hospitalization in 2022 with psychosis deemed likely related to lupus. Altered mental status could also be due to metabolic encephalopthy given episodes of hyper and hypoglycemia and hypernatremia.     Recommendations:  -OK to continue oral vanc if there is significant clinical concern for c diff despite toxin being negative   -Continue vanc, cefrtiaxone, ampicillin, and acyclovir until CSF studies result  -Please obtain LP w/ cell count and diff, glucose, protein, gram stain, and culture; CSF biofire; CSF HSV PCR; CSF and serum cryptococcal antigen     Ainsley Ortiz, M4, ID Consults Team A    Patient discussed with Dr. Schrader    I, or a resident under my supervision, was present with the medical student who participated in the documentation of this note.  I have personally seen and examined the patient and performed the medical decision-making components. I have reviewed the medical student documentation and/or resident documentation and verified the findings in the note as written with additions or exceptions as stated in the body of the note.    This afternoon LP results are back, just 4 WBC and glucose normal. This CSF is not c/w bacterial meningitis-would discontinue ampicillin, vancomyin, ceftriaxone. Would continue acyclovir pending HSV PCR results.    Luci Schrader MD

## 2024-01-18 NOTE — SIGNIFICANT EVENT
ICU to [facility] Transfer Summary     I:  ICU Admission Reason & Brief ICU Course:    Bing Holliday is a 63 y.o. 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 initially c/f adrenal crisis from sepsis, but now transferred for inpatient rheum consult iso c/f lupus cerebritis (vs lupus psychosis) vs meningitis vs adrenal insufficiency as etiology for encephalopathy.      On admission to [facility] on 1/14, patient was initially hypotensive and tachycardic with a lactate of 3.7.  This improved with stress dose steroids, fluids, and Vanco/cefepime/Flagyl.  On 1/16, patient had decreased from baseline mental status.  At baseline she is somewhat confused (for the last year), but she declined to A&O 0-2.  Neurology started Keppra and Ativan.  She was also started on empiric meningitis coverage. patient was transferred to [facility] for further workup.    While at [facility], patient remained hemodynamically stable on room air without pressor support.  Her mental status oscillates between A&O 1-3, but she answers questions.  She seems to be responding to internal stimuli and having visual hallucinations.  However she is protecting her airway.  Rheumatology, ID, neurology, and endocrine are following this patient.  Further recommendations will be based off of LP (orders are in, patient has been sent to for this).  Also, patient was noted to have diarrhea with a positive PCR and a negative toxin.  She has had multiple positive PCR's with negative toxins in the past.  We will treat.      C: Code Status/DPOA Info/Goals of Care/ACP Note    DNR and No Intubation  Multiple prior encounters with physicians confirmed this.  This was discussed with surrogate and he agrees.  DPOA/Contact Number: López Lang (Son)  [phone]  (Mobile)     U: Unprescribing & Pertinent High-Risk Medications    Changes to home meds: hydrocortisone 25 q6 (instead of prednisone), holding home amlodipine     Anticoagulation: No Reason for no VTE prophylaxis:medical contraindication due to upcoming LP, chadsvasc 4 but pancytopenic    Antibiotics:   #empiric coverage of meningitis  -ampicillin 2 g q24hr  -rocephin 2g BID  -vancomycin IV  -IV acyclovir 10mg/kg bid     #c diff, recurrent episode  - 1/17 started oral vanomycin via NGT      P: Pending Tests at the Time of Transfer   Lumbar Puncture       A: Active consultants, including Rehab:   [x]  Subspecialty Consultants: infectious disease endocrine, rheumatology, neurology  [x]  PT  [x]  OT  []  SLP  []  Wound Care    U: Uncertainty Measure/Diagnostic Pause:    Working diagnosis at the time of transfer lupus psychosis though ddx includes meningitis, lupus cerebritis, lupus psychosis, seizures, dementia,      Diagnosis Degree of Certainty: 3. Marked uncertainty about the clinical diagnosis.     S: Summary of Major Problems and To-Dos:   Neuro:  #c/f lupus cerebritis  -see rheumatology section below.   -LP for cell count/diff, total protein, oligoclonal bands, IgG index, anti-ribosomal P ab, anti-neuronal Ab      #seizure?  - c/w Keppra 500 BID   - cvEEG   - Ativan 2 mg IV for prolonged motor seizure lasting more than 3 minutes or a cluster of 3 or more motor seizures in an 8 hour period.     #empiric coverage of meningitis  -immunocompromised on cellcept on prednisone  -pancytopenic at baseline, on admission was tachycardic and hypotensive with lactate 3.4   Plan:   -ampicillin 2 g q24hr  -rocephin 2g BID  -vancomycin   -IV acyclovir 10mg/kg bid   -LP (cell count tube 1 and tube 4, protein, glucose, meningitis panel, IgG, oligoclonal bands, HSV)   -neuro following        Cardiovascular:  #Paroxysmal Atrial fibrillation on eliquis  -Chadsvasc 4  -DOAC held 1/15 in anticipation of LP  -Will consider prophylactic  heparin after LP     #Sinus bradycardia  - Hemodynamically stable  - Will consider atropine if patient becomes unstable     #CAD s/p CABG 2013  -statin     Respiratory:  - No active issues     GI:  #c diff, recurrent episode?  (tx'ed w/PO vanc pulse dose in 3/2023 with taper, metronidazole 2/2023)  - as per chart review, has chronic diarrhea with IBS-type picture?  - diarrhea with positive PCR positive, EIA negative 1/18  - PCR was positive 5/2022, 10/2022, 2/2023. Toxins were negative each of these times  - 1/17 started oral vanomycin via NGT  - ID following      Renal/:  #JED on CKD 3  - Baseline appears to be to be between 1.6 and 1.8  - Improving since admission with antibiotics and fluids     #hypernatremia 148  -likely due to NPO status  -based on ideal Na of 140-145, will need 1-2.4 L   -will give 100 ml/hr of d5w 1/2 NS for 6 hours while TF are held (LP)  -pm RFP      Heme/Onc:  #Pancytopenia  - May be related to lupus  - Has required 2 units of packed red blood cells on 1/16 1/17  - Hemoglobin goal greater than 7, platelets greater than 10, if bleeding platelets greater than 50.  - See below     Endo:  #adrenal insufficiency from exogenous steroid use  -on chronic prednisone (10mg + 5mg)   -Low concern for adrenal crisis  -stress dose hydrocortisone 25mg q6hrs ([id number] on 1/14) in light of presumed infection  -endocrine following     ID:  #Concern for meningitis  - antibiotic coverage as above     MSK/Rheum:  #SLE  #Concern for lupus cerebritis  - Holding MMF in setting of presumed infection  - Continue on stress dose steroids as above  - Will check complement levels, antidouble-stranded DNA, KATHI panel, MONTSERRAT panel.  - LP for cell count/diff, total protein, oligoclonal bands, IgG index, anti-ribosomal P ab, anti-neuronal Ab   - Rheumatology is following.  Further recommendations based on results of LP     Derm:  - No active issues     Psych:  - No active issues     F: Replete PRN  E: Replete PRN  N: N.p.o.,  will do tube feeds after procedure   DVT Ppx: Defer until after procedure  GI Ppx: Esomeprazole  Access/Lines: 2 PIV, midline 1/16/ Montano 1/16  Abx: IV Vancomycin, PO vancomycin, ampicillin, Rocephin, acyclovir  O2: None      Pain regimen: Tylenol prn   GI Laxative: Holding as patient has a diarrhea since antibiotic initiation     Code status: DNR and No Intubation  Emergency contact: López Lang (son) [phone] (Home Phone)     To-do list prior to transfer:  [] LP with IR    [] After procedure: Resume TF, dvt vs therapeutic AC after procedure  [] On D52 1/2 NS fluids for 6 hours at 100 ml/hr, start FWF after procedure  E: Exam, including Lines/Drains/Airways & Data Review:   Access/Lines: 2 PIV, midline 1/16/ Montano 1/16  Difficult airway? N/A  Lines/drains assessed for removal? Yes, describe: keep for abx access    Within 30 minutes of the patient physically leaving the floor, a Floor Readiness Note needs to be placed with updated vitals.

## 2024-01-18 NOTE — SIGNIFICANT EVENT
Floor Readiness Note       I, personally, evaluated Bing Holliday prior to transfer to the floor, including reviewing all current laboratory and imaging studies. The patient remains appropriate for transfer to the floor. Bedside nurse and respiratory therapy are also in agreement of patient's readiness for the floor.     Brief summary:  Bing Holliday is a 63 y.o. 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 initially c/f adrenal crisis from sepsis, but now transferred for inpatient rheum consult iso c/f lupus cerebritis (vs lupus psychosis) vs meningitis vs adrenal insufficiency as etiology for encephalopathy.       On admission to [facility] on 1/14, patient was initially hypotensive and tachycardic with a lactate of 3.7.  This improved with stress dose steroids, fluids, and Vanco/cefepime/Flagyl.  On 1/16, patient had decreased from baseline mental status.  At baseline she is somewhat confused (for the last year), but she declined to A&O 0-2.  Neurology started Keppra and Ativan.  She was also started on empiric meningitis coverage. patient was transferred to [facility] for further workup.     While at [facility], patient remained hemodynamically stable on room air without pressor support.  Her mental status oscillates between A&O 1-3, but she answers questions.  She seems to be responding to internal stimuli and having visual hallucinations.  However she is protecting her airway.  Rheumatology, ID, neurology, and endocrine are following this patient.  Further recommendations will be based off of LP (orders are in, patient has been sent to for this).  Also, patient was noted to have diarrhea with a positive PCR and a negative toxin.  She has had multiple positive PCR's with negative toxins in the  past.  We will treat.    Updated focused Physical Exam:  Constitutional:       Appearance: She is ill-appearing.   Cardiovascular:      Rate and Rhythm: Regular rhythm. Bradycardia present.      Pulses: Normal pulses.      Heart sounds: Normal heart sounds.   Pulmonary:      Effort: Pulmonary effort is normal.      Breath sounds: Normal breath sounds.   Abdominal:      General: Abdomen is flat. There is no distension.      Palpations: Abdomen is soft.      Tenderness: There is no abdominal tenderness.   Musculoskeletal:         General: Deformity (bilateral hammer toes) present.   Skin:     General: Skin is warm.      Capillary Refill: Capillary refill takes less than 2 seconds.      Coloration: Skin is not jaundiced.   Neurological:      Mental Status: She is disoriented.      Comments: Answers questions related to her symptoms but she is aaox1 (self)   Psychiatric:         Attention and Perception: She perceives visual hallucinations.     Current Vital Signs:  Heart Rate: 60 (01/18/24 0900 : Reza Gaona, RN)  BP: 158/84 (01/18/24 0900 : Reza Gaona, RN)  Temp: 35.2 °C (95.4 °F) (01/18/24 1300 : Bacilio Renner)  Resp: 15 (01/18/24 0900 : Reza Gaona, RN)  SpO2: 98 % (01/18/24 0900 : Reza Gaona, RN)    Relevant updates since rounds:  Pt had her LP with IR and labs were sent to the core lab.    Accepting team, Magali, received sign out and the Provider Care team/Attending has been updated. Bedside nurse will now call accepting nurse for report and patient will be transferred to [place] [address].    KKlever Sal MD, PGY 3  Internal Medicine

## 2024-01-18 NOTE — PROGRESS NOTES
"Vancomycin Dosing by Pharmacy- FOLLOW UP    Bing Holliday is a 63 y.o. year old female who Pharmacy has been consulted for vancomycin dosing for CNS/meningoencephalitis. Based on the patient's indication and renal status this patient is being dosed based on a goal AUC of 500-600.     Renal function is currently stable.    Current vancomycin dose: 1500 mg given every 24 hours    Estimated vancomycin AUC on current dose: 593 mg/L.hr     Visit Vitals  BP 91/79   Pulse 50   Temp 35.1 °C (95.2 °F)   Resp 16        Lab Results   Component Value Date    CREATININE 1.31 (H) 01/18/2024    CREATININE 1.39 (H) 01/17/2024    CREATININE 1.43 (H) 01/17/2024    CREATININE 1.48 (H) 01/17/2024        Patient weight is No results found for: \"PTWEIGHT\"    No results found for: \"CULTURE\"     I/O last 3 completed shifts:  In: 2508 (25.3 mL/kg) [I.V.:70 (0.7 mL/kg); Blood:175; NG/GT:600; IV Piggyback:1663]  Out: 2650 (26.7 mL/kg) [Urine:2650 (0.7 mL/kg/hr)]  Dosing Weight: 99.3 kg   [unfilled]    Lab Results   Component Value Date    PATIENTTEMP 37.0 11/13/2022        Assessment/Plan    Within goal AUC range. Continue current vancomycin regimen.    This dosing regimen is predicted by [employer] to result in the following pharmacokinetic parameters:  Regimen: 1500 mg IV every 24 hours.  Start time: 20:30 on 01/18/2024  Exposure target: AUC24 (range)400-600 mg/L.hr   AUC24,ss: 593 mg/L.hr  Probability of AUC24 > 400: 98 %  Ctrough,ss: 17.6 mg/L  Probability of Ctrough,ss > 20: 34 %  Probability of nephrotoxicity (Lodise YUDY 2009): 13 %    The next level will be obtained on 1/21 with AM labs. May be obtained sooner if clinically indicated.   Will continue to monitor renal function daily while on vancomycin and order serum creatinine at least every 48 hours if not already ordered.  Follow for continued vancomycin needs, clinical response, and signs/symptoms of toxicity.       Carson Velázquez, PharmD, BCPS           "

## 2024-01-18 NOTE — PROGRESS NOTES
Bing Holliday is a 63 y.o. female on day 1 of admission presenting with Meningitis.    Subjective   Patient seen and evaluated at bedside. Much more alert today. She currently endorses chills. Denies any pain including headache or arthralgias. Denies oral ulcers and rash.    Objective   General: Cooperative, in NAD   Eyes: Conjunctiva clear, sclera white, anicteric   Nose: No external deformity, no mucosal crusting or signs of bleeding   Throat/Mouth: Moist mucous membranes, normal dentition, no ulcers or lesions   Lungs: No respiratory distress; lungs CTAB; no wheezes, rales, rhonchi, or stridor   Heart: Normal S1 and S2; bradycardic, normal rhythm; no murmurs, rubs, or gallops  Skin: No rashes, ulcers, tophi, or nodules noted  MSK:   Upper Extremities:   Hands: Jaccoud arthropathy noted with reversible deformities including flexion and ulnar deviation at the MCPs, swan-neck and boutonniere deformities multiple digits  Wrists: No erythema, warmth, synovitis, or pain of the wrists; normal ROM  Elbows: No erythema, warmth, synovitis, or pain; normal ROM   Lower Extremities:   Knees: No erythema, warmth, swelling, or pain  Ankles: No erythema, warmth, synovitis, or pain  Feet: No gross deformity, erythema, or swelling; MTP squeeze negative bilaterally    Last Recorded Vitals  Blood pressure 158/84, pulse 60, temperature 35.2 °C (95.4 °F), resp. rate 15, weight 99.3 kg (218 lb 14.7 oz), SpO2 98 %.  Intake/Output last 3 Shifts:  I/O last 3 completed shifts:  In: 2508 (25.3 mL/kg) [I.V.:70 (0.7 mL/kg); Blood:175; NG/GT:600; IV Piggyback:1663]  Out: 2650 (26.7 mL/kg) [Urine:2650 (0.7 mL/kg/hr)]  Dosing Weight: 99.3 kg     Relevant Results  Scheduled medications  acyclovir, 10 mg/kg (Ideal), intravenous, q8h  ampicillin, 2 g, intravenous, q4h  cefTRIAXone, 2 g, intravenous, q12h  pantoprazole, 40 mg, oral, Daily before breakfast   Or  esomeprazole, 40 mg, nasoduodenal tube, Daily before breakfast   Or  pantoprazole, 40  mg, intravenous, Daily before breakfast  folic acid, 1 mg, oral, Daily  hydrocortisone sodium succinate, 25 mg, intravenous, q6h  insulin lispro, 0-10 Units, subcutaneous, q4h  levETIRAcetam, 500 mg, intravenous, q12h  lidocaine PF, 5 mL, subcutaneous, Once  potassium phosphate, 21 mmol, intravenous, Once  vancomycin, 125 mg, nasogastric tube, q6h GERALD  vancomycin, 1,500 mg, intravenous, q24h      Continuous medications  dextrose 5%-0.45 % sodium chloride, 100 mL/hr, Last Rate: 100 mL/hr (01/18/24 0924)      PRN medications  PRN medications: acetaminophen, LORazepam    Results for orders placed or performed during the hospital encounter of 01/17/24 (from the past 24 hour(s))   Urinalysis with Reflex Culture and Microscopic   Result Value Ref Range    Color, Urine Straw Straw, Yellow    Appearance, Urine Clear Clear    Specific Gravity, Urine 1.014 1.005 - 1.035    pH, Urine 5.0 5.0, 5.5, 6.0, 6.5, 7.0, 7.5, 8.0    Protein, Urine 100 (2+) (N) NEGATIVE mg/dL    Glucose, Urine NEGATIVE NEGATIVE mg/dL    Blood, Urine SMALL (1+) (A) NEGATIVE    Ketones, Urine NEGATIVE NEGATIVE mg/dL    Bilirubin, Urine NEGATIVE NEGATIVE    Urobilinogen, Urine <2.0 <2.0 mg/dL    Nitrite, Urine NEGATIVE NEGATIVE    Leukocyte Esterase, Urine TRACE (A) NEGATIVE   Extra Urine Gray Tube   Result Value Ref Range    Extra Tube Hold for add-ons.    Microscopic Only, Urine   Result Value Ref Range    WBC, Urine NONE 1-5, NONE /HPF    RBC, Urine 1-2 NONE, 1-2, 3-5 /HPF    Bacteria, Urine 1+ (A) NONE SEEN /HPF    Mucus, Urine 1+ Reference range not established. /LPF   POCT GLUCOSE   Result Value Ref Range    POCT Glucose 99 74 - 99 mg/dL   Renal Function Panel   Result Value Ref Range    Glucose 114 (H) 74 - 99 mg/dL    Sodium 149 (H) 136 - 145 mmol/L    Potassium 4.0 3.5 - 5.3 mmol/L    Chloride 114 (H) 98 - 107 mmol/L    Bicarbonate 28 21 - 32 mmol/L    Anion Gap 11 10 - 20 mmol/L    Urea Nitrogen 22 6 - 23 mg/dL    Creatinine 1.39 (H) 0.50 - 1.05  mg/dL    eGFR 43 (L) >60 mL/min/1.73m*2    Calcium 8.2 (L) 8.6 - 10.6 mg/dL    Phosphorus 2.8 2.5 - 4.9 mg/dL    Albumin 2.5 (L) 3.4 - 5.0 g/dL   Magnesium   Result Value Ref Range    Magnesium 1.89 1.60 - 2.40 mg/dL   CBC and Auto Differential   Result Value Ref Range    WBC 4.6 4.4 - 11.3 x10*3/uL    nRBC 0.9 (H) 0.0 - 0.0 /100 WBCs    RBC 3.02 (L) 4.00 - 5.20 x10*6/uL    Hemoglobin 9.2 (L) 12.0 - 16.0 g/dL    Hematocrit 28.6 (L) 36.0 - 46.0 %    MCV 95 80 - 100 fL    MCH 30.5 26.0 - 34.0 pg    MCHC 32.2 32.0 - 36.0 g/dL    RDW 20.1 (H) 11.5 - 14.5 %    Platelets 74 (L) 150 - 450 x10*3/uL    Immature Granulocytes %, Automated 1.7 (H) 0.0 - 0.9 %    Immature Granulocytes Absolute, Automated 0.08 0.00 - 0.70 x10*3/uL   KATHI Panel   Result Value Ref Range    Anti-SM 0.3 <1.0 AI    Anti-RNP 0.5 <1.0 AI    Anti-SM/RNP >8.0 (H) <1.0 AI    Anti-SSA 0.5 <1.0 AI    Anti-SSB <0.2 <1.0 AI    Anti-SCL-70 <0.2 <1.0 AI    Anti-RAMEZ-1 IgG <0.2 <1.0 AI    Anti-Chromatin 2.5 (H) <1.0 AI    Anti-Centromere <0.2 <1.0 AI    ANTI-RIBOSOMAL P <0.2 <1.0 AI    Anti-DNA (DS) <1.0 <5.0 IU/mL   C3 complement   Result Value Ref Range    C3 Complement 108 87 - 200 mg/dL   C4 complement   Result Value Ref Range    C4 Complement 54 (H) 10 - 50 mg/dL   Sedimentation rate, automated   Result Value Ref Range    Sedimentation Rate 17 0 - 30 mm/h   Manual Differential   Result Value Ref Range    Neutrophils %, Manual 86.8 40.0 - 80.0 %    Bands %, Manual 5.3 0.0 - 5.0 %    Lymphocytes %, Manual 4.4 13.0 - 44.0 %    Monocytes %, Manual 3.5 2.0 - 10.0 %    Eosinophils %, Manual 0.0 0.0 - 6.0 %    Basophils %, Manual 0.0 0.0 - 2.0 %    Seg Neutrophils Absolute, Manual 3.99 1.20 - 7.00 x10*3/uL    Bands Absolute, Manual 0.24 0.00 - 0.70 x10*3/uL    Lymphocytes Absolute, Manual 0.20 (L) 1.20 - 4.80 x10*3/uL    Monocytes Absolute, Manual 0.16 0.10 - 1.00 x10*3/uL    Eosinophils Absolute, Manual 0.00 0.00 - 0.70 x10*3/uL    Basophils Absolute, Manual 0.00  0.00 - 0.10 x10*3/uL    Total Cells Counted 114     Neutrophils Absolute, Manual 4.23 1.20 - 7.70 x10*3/uL    RBC Morphology No significant RBC morphology present    POCT GLUCOSE   Result Value Ref Range    POCT Glucose 126 (H) 74 - 99 mg/dL   POCT GLUCOSE   Result Value Ref Range    POCT Glucose 144 (H) 74 - 99 mg/dL   POCT GLUCOSE   Result Value Ref Range    POCT Glucose 123 (H) 74 - 99 mg/dL   Vancomycin   Result Value Ref Range    Vancomycin 29.1 (H) 5.0 - 20.0 ug/mL   CBC and Auto Differential   Result Value Ref Range    WBC 3.9 (L) 4.4 - 11.3 x10*3/uL    nRBC 1.3 (H) 0.0 - 0.0 /100 WBCs    RBC 3.08 (L) 4.00 - 5.20 x10*6/uL    Hemoglobin 9.4 (L) 12.0 - 16.0 g/dL    Hematocrit 29.0 (L) 36.0 - 46.0 %    MCV 94 80 - 100 fL    MCH 30.5 26.0 - 34.0 pg    MCHC 32.4 32.0 - 36.0 g/dL    RDW 20.7 (H) 11.5 - 14.5 %    Platelets 78 (L) 150 - 450 x10*3/uL    Neutrophils % 76.8 40.0 - 80.0 %    Immature Granulocytes %, Automated 3.9 (H) 0.0 - 0.9 %    Lymphocytes % 12.6 13.0 - 44.0 %    Monocytes % 6.4 2.0 - 10.0 %    Eosinophils % 0.3 0.0 - 6.0 %    Basophils % 0.0 0.0 - 2.0 %    Neutrophils Absolute 2.98 1.20 - 7.70 x10*3/uL    Immature Granulocytes Absolute, Automated 0.15 0.00 - 0.70 x10*3/uL    Lymphocytes Absolute 0.49 (L) 1.20 - 4.80 x10*3/uL    Monocytes Absolute 0.25 0.10 - 1.00 x10*3/uL    Eosinophils Absolute 0.01 0.00 - 0.70 x10*3/uL    Basophils Absolute 0.00 0.00 - 0.10 x10*3/uL   Hepatic Function Panel   Result Value Ref Range    Albumin 2.7 (L) 3.4 - 5.0 g/dL    Bilirubin, Total 0.3 0.0 - 1.2 mg/dL    Bilirubin, Direct 0.1 0.0 - 0.3 mg/dL    Alkaline Phosphatase 93 33 - 136 U/L    ALT 30 7 - 45 U/L    AST 24 9 - 39 U/L    Total Protein 4.7 (L) 6.4 - 8.2 g/dL   Magnesium   Result Value Ref Range    Magnesium 2.13 1.60 - 2.40 mg/dL   Phosphorus   Result Value Ref Range    Phosphorus 2.4 (L) 2.5 - 4.9 mg/dL   Basic Metabolic Panel   Result Value Ref Range    Glucose 93 74 - 99 mg/dL    Sodium 150 (H) 136 - 145  mmol/L    Potassium 3.4 (L) 3.5 - 5.3 mmol/L    Chloride 113 (H) 98 - 107 mmol/L    Bicarbonate 27 21 - 32 mmol/L    Anion Gap 13 10 - 20 mmol/L    Urea Nitrogen 20 6 - 23 mg/dL    Creatinine 1.31 (H) 0.50 - 1.05 mg/dL    eGFR 46 (L) >60 mL/min/1.73m*2    Calcium 8.1 (L) 8.6 - 10.6 mg/dL   Morphology   Result Value Ref Range    RBC Morphology No significant RBC morphology present    POCT GLUCOSE   Result Value Ref Range    POCT Glucose 95 74 - 99 mg/dL   Red Top   Result Value Ref Range    Extra Tube Hold for add-ons.    POCT GLUCOSE   Result Value Ref Range    POCT Glucose 107 (H) 74 - 99 mg/dL   ECG 12 lead   Result Value Ref Range    Ventricular Rate 51 BPM    Atrial Rate 51 BPM    CA Interval 182 ms    QRS Duration 106 ms    QT Interval 452 ms    QTC Calculation(Bazett) 416 ms    P Axis 58 degrees    R Axis -12 degrees    T Axis -33 degrees    QRS Count 8 beats    Q Onset 214 ms    P Onset 123 ms    P Offset 187 ms    T Offset 440 ms    QTC Fredericia 428 ms   Electrocardiogram, 12-lead PRN ACS symptoms   Result Value Ref Range    Ventricular Rate 65 BPM    Atrial Rate 65 BPM    CA Interval 164 ms    QRS Duration 102 ms    QT Interval 408 ms    QTC Calculation(Bazett) 424 ms    P Axis 64 degrees    R Axis -17 degrees    T Axis 8 degrees    QRS Count 11 beats    Q Onset 214 ms    P Onset 132 ms    P Offset 192 ms    T Offset 418 ms    QTC Fredericia 418 ms   POCT GLUCOSE   Result Value Ref Range    POCT Glucose 154 (H) 74 - 99 mg/dL     Electrocardiogram, 12-lead PRN ACS symptoms    Result Date: 1/18/2024  Sinus rhythm with occasional Premature ventricular complexes Moderate voltage criteria for LVH, may be normal variant Nonspecific T wave abnormality Abnormal ECG When compared with ECG of 17-JAN-2024 08:55, Premature ventricular complexes are now Present Nonspecific T wave abnormality, worse in Lateral leads    ECG 12 lead    Result Date: 1/18/2024  Sinus bradycardia Moderate voltage criteria for LVH, may be  normal variant Nonspecific T wave abnormality Abnormal ECG When compared with ECG of 14-JAN-2024 16:18, Sinus rhythm has replaced Atrial fibrillation Vent. rate has decreased BY  35 BPM QT has shortened    XR abdomen 1 view    Result Date: 1/18/2024  Interpreted By:  Sven Wen and Dervishi Mario STUDY: XR ABDOMEN 1 VIEW;  1/17/2024 8:53 pm   INDICATION: Signs/Symptoms:Dobhoff tube pulled back.   COMPARISON: Abdominal radiograph: 01/17/2024   ACCESSION NUMBER(S): [id number]   ORDERING CLINICIAN: SUSHILA CRISTOBAL   FINDINGS: A single AP radiograph of the abdomen was performed:   Interval retraction of enteric tube which now projects over the gastric antrum/proximal duodenum.   Nonobstructive bowel gas pattern. Limited evaluation of pneumoperitoneum on supine imaging, however no gross evidence of free air is noted.   Visualized lung bases demonstrate no significant abnormality.   Osseous structures demonstrate no acute bony changes.       1.  Retracted enteric tube which now projects over the gastric antrum/proximal duodenum. 2. The bowel gas pattern is nonobstructive.   I personally reviewed the images/study and I agree with the findings as stated by Resident Leobardo Kearns MD. This study was interpreted at [facility], [place].   MACRO: None   Signed by: Sven Hanh 1/18/2024 6:54 AM Dictation workstation:   [id number]    XR abdomen 1 view    Result Date: 1/18/2024  Interpreted By:  Sven Wen, STUDY: XR ABDOMEN 1 VIEW;  1/17/2024 5:42 pm   INDICATION: Signs/Symptoms:dobhoff pulled back.   COMPARISON: Radiograph dated 01/17/2024, 11:51 a.m.   ACCESSION NUMBER(S): [id number]   ORDERING CLINICIAN: WILLIAN SHUKLA   FINDINGS: Single-view of the abdomen. The lower pelvis is not included. Dobbhoff catheter is in place with the tip projecting over the expected location of the 2nd/3rd portion of the duodenum. Nonobstructive bowel gas pattern.   Limited evaluation of pneumoperitoneum on supine imaging, however no gross evidence of free air is noted.   Cardiac silhouette size is prominent/enlarged.   Osseous structures demonstrate no acute bony changes.       1.  As described above   Signed by: Sven Hahn 1/18/2024 6:53 AM Dictation workstation:   [id number]    EEG    IMPRESSION Impression This vEEG is indicative of a moderate to severe diffuse encephalopathy. No epileptiform discharges or seizures are seen. The patient had a non-epileptic left arm jerking without EEG correlation on 17:34 Jan 15. A full report will be scanned into the patient's chart at a later time. This report has been interpreted and electronically signed by    XR chest 1 view    Result Date: 1/17/2024  Interpreted By:  Presley Bland, STUDY: XR CHEST 1 VIEW; XR ABDOMEN 1 VIEW;  1/17/2024 11:59 am   INDICATION: Signs/Symptoms:hypoxia; Signs/Symptoms:Verification of corpac placement.   COMPARISON: Chest radiograph 01/14/2024 and chest, abdomen pelvis CT scan 01/14/2024   ACCESSION NUMBER(S): [id number]; [id number]   ORDERING CLINICIAN: WILBERTO DELA CRUZ   FINDINGS: Single AP radiograph of the chest. Single AP radiograph of the abdomen   Enteric tube is seen coursing below diaphragm and looping within upper abdomen and tip pointing towards left within expected location of gastric body. Right upper extremity PICC now seen in place with tip projecting over lower SVC/superior cavoatrial junction.   The cardiomediastinal silhouette is stable in size and configuration. Mild aortic knob calcifications. Poorly visualized coronary artery calcifications and stents.   Low lung volumes with bronchovascular crowding. New subtle patchy and hazy bibasilar airspace opacities may represent atelectasis/infiltrates. There is no sizable pleural effusion or pneumothorax.   Nonobstructive bowel gas pattern.Limited evaluation of pneumoperitoneum on semi upright imaging, however no gross  evidence of free air is noted.   Osseous structures demonstrate no acute bony changes.       1.  Enteric tube is seen looping within upper abdomen tip pointing towards left within expected location of gastric body. 2. Nonobstructive bowel gas pattern. 3. New subtle patchy and hazy bibasilar airspace opacities may represent atelectasis/infiltrates. Also correlate clinically with concern for aspiration.   Signed by: Presley Bland 1/17/2024 12:16 PM Dictation workstation:   [id number]    XR abdomen 1 view    Result Date: 1/17/2024  Interpreted By:  Presley Bland, STUDY: XR CHEST 1 VIEW; XR ABDOMEN 1 VIEW;  1/17/2024 11:59 am   INDICATION: Signs/Symptoms:hypoxia; Signs/Symptoms:Verification of corpac placement.   COMPARISON: Chest radiograph 01/14/2024 and chest, abdomen pelvis CT scan 01/14/2024   ACCESSION NUMBER(S): [id number]; [id number]   ORDERING CLINICIAN: WILBERTO DELA CRUZ   FINDINGS: Single AP radiograph of the chest. Single AP radiograph of the abdomen   Enteric tube is seen coursing below diaphragm and looping within upper abdomen and tip pointing towards left within expected location of gastric body. Right upper extremity PICC now seen in place with tip projecting over lower SVC/superior cavoatrial junction.   The cardiomediastinal silhouette is stable in size and configuration. Mild aortic knob calcifications. Poorly visualized coronary artery calcifications and stents.   Low lung volumes with bronchovascular crowding. New subtle patchy and hazy bibasilar airspace opacities may represent atelectasis/infiltrates. There is no sizable pleural effusion or pneumothorax.   Nonobstructive bowel gas pattern.Limited evaluation of pneumoperitoneum on semi upright imaging, however no gross evidence of free air is noted.   Osseous structures demonstrate no acute bony changes.       1.  Enteric tube is seen looping within upper abdomen tip pointing towards left within expected location of gastric body. 2.  Nonobstructive bowel gas pattern. 3. New subtle patchy and hazy bibasilar airspace opacities may represent atelectasis/infiltrates. Also correlate clinically with concern for aspiration.   Signed by: Presley Bland 1/17/2024 12:16 PM Dictation workstation:   [id number]    XR abdomen 1 view    Result Date: 1/17/2024  Interpreted By:  Roni Dwyer, STUDY: XR ABDOMEN 1 VIEW;  1/17/2024 4:34 am   INDICATION: Signs/Symptoms:NG / Feeding Tube Placement Verification.   COMPARISON: None.   ACCESSION NUMBER(S): [id number]   ORDERING CLINICIAN: ELENA DAVALOS   FINDINGS: Feeding tube is coiled in the stomach, tube entering the distal stomach with the tip doubled back and terminating in the mid body. Nonobstructive bowel gas pattern.       Feeding tube coiled in the stomach.     Signed by: Roni Dwyer 1/17/2024 7:18 AM Dictation workstation:   [id number]    Bedside Midline Imaging    Result Date: 1/16/2024  These images are not reportable by radiology and will not be interpreted by  Radiologists.       Assessment/Plan   Principal Problem:    Brinda Holliday is a 63 y.o. female w/a history of SLE (dx ~ 35yrs ago, follows w/Dr. 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 Rheumatology is consulted for possible NPSLE.     This is a complex case given the patient's multitude of comorbidities and history of possible NPSLE. CNS involvement in SLE can result in altered mental status, psychosis, and seizures; however, it is difficult to make a definitive diagnosis. Most patients have serologically active SLE. MRI may be normal or may demonstrate non-specific white matter changes such as increased periventricular signaling. CSF analysis often demonstrates evidence of inflammation  including pleocytosis, elevated total protein, the presence of oligoclonal bands, and an increased IgG index. It is important to acknowledge other potential etiologies including infection (particularly given the patient's immunocompromised status), adrenal insufficiency (note that she had refractory hypoglycemia, hypotension, and hypernatremia at the time of admission), or primary demyelinating disease.     Markers of disease activity obtained and notable for normal dsDNA, mildly elevated C4, and normal C3 which argue against active SLE. Cytopenias, including lymphopenia, can be a sign of active SLE; however, Ms. Holliday is pancytopenic at baseline so this is somewhat less useful for assessment of SLE disease activity currently. LP performed today which will be helpful for diagnosis. In addition to usual labs and culture, recommend cell count and diff, total protein, IgG index, oligoclonal bands, anti-neuronal ab, and anti-ribosomal P ab. She is on Solu-cortef currently for adrenal insufficiency so additional immunosuppression at this point in time is not recommended, especially until infection has been definitively ruled out.      Recommendations:  - LP for cell count/diff, total protein, oligoclonal bands, IgG index, anti-ribosomal P ab, anti-neuronal Ab  - Hydrocortisone as per endocrinology recommendations  - No further immunosuppression at this time     Rheumatology will continue to follow.     Please Epic secure chat myself or page [phone] with questions or concerns.     Patient will be seen and discussed with attending Dr. Lang.     Vivian Apple MD  PGY-V, Rheumatology

## 2024-01-18 NOTE — PROGRESS NOTES
SOCIAL WORK NOTE   Patient not able to participate. VM left for son for assessment . Social work to follow.  GERI Miramontes, LISW-S ([id number])

## 2024-01-18 NOTE — PROGRESS NOTES
Bing Holliday is a 63 y.o. female on day 1 of admission presenting with Meningitis.    Subjective   Patient seen in MICU following LP. Oriented to self and location (though possible she remembers answer from previous discussions). Denies pain aside from around vaginal area. Per nurse, has been having frequent Bms and has some skin breakdown at site from frequent cleaning. Denies HA, vision changes, neck pain, CP, SOB, abdominal pain, f/c.    Objective     Physical Exam  Gen: laying in bed rocking slightly with eyes closed   HEENT: EOMI, PERRL  Pulm: CTAB, no increased WOB  CV: RRR, no murmurs  GI: abdomen soft, nontender, nondistended, no rebound or guarding  Perfusion/Volume status: no LE edema, warm  Neuro: strength and sensation intact diffusely, CN II-XII grossly intact. Alert and oriented to person and place.  MSK: able ot touch chin to chest w/o difficulty, fully turns head to right side, rOM slightly decreased on left  Psych: pleasant, does not seem to understand why she is in the hospital or what has been happening    Last Recorded Vitals  Blood pressure 158/84, pulse 60, temperature 35.1 °C (95.2 °F), resp. rate 15, weight 99.3 kg (218 lb 14.7 oz), SpO2 98 %.  Intake/Output last 3 Shifts:  I/O last 3 completed shifts:  In: 2508 (25.3 mL/kg) [I.V.:70 (0.7 mL/kg); Blood:175; NG/GT:600; IV Piggyback:1663]  Out: 2650 (26.7 mL/kg) [Urine:2650 (0.7 mL/kg/hr)]  Dosing Weight: 99.3 kg     Relevant Results    Results for orders placed or performed during the hospital encounter of 01/17/24 (from the past 24 hour(s))   POCT GLUCOSE   Result Value Ref Range    POCT Glucose 99 74 - 99 mg/dL   Renal Function Panel   Result Value Ref Range    Glucose 114 (H) 74 - 99 mg/dL    Sodium 149 (H) 136 - 145 mmol/L    Potassium 4.0 3.5 - 5.3 mmol/L    Chloride 114 (H) 98 - 107 mmol/L    Bicarbonate 28 21 - 32 mmol/L    Anion Gap 11 10 - 20 mmol/L    Urea Nitrogen 22 6 - 23 mg/dL    Creatinine 1.39 (H) 0.50 - 1.05 mg/dL    eGFR  43 (L) >60 mL/min/1.73m*2    Calcium 8.2 (L) 8.6 - 10.6 mg/dL    Phosphorus 2.8 2.5 - 4.9 mg/dL    Albumin 2.5 (L) 3.4 - 5.0 g/dL   Magnesium   Result Value Ref Range    Magnesium 1.89 1.60 - 2.40 mg/dL   CBC and Auto Differential   Result Value Ref Range    WBC 4.6 4.4 - 11.3 x10*3/uL    nRBC 0.9 (H) 0.0 - 0.0 /100 WBCs    RBC 3.02 (L) 4.00 - 5.20 x10*6/uL    Hemoglobin 9.2 (L) 12.0 - 16.0 g/dL    Hematocrit 28.6 (L) 36.0 - 46.0 %    MCV 95 80 - 100 fL    MCH 30.5 26.0 - 34.0 pg    MCHC 32.2 32.0 - 36.0 g/dL    RDW 20.1 (H) 11.5 - 14.5 %    Platelets 74 (L) 150 - 450 x10*3/uL    Immature Granulocytes %, Automated 1.7 (H) 0.0 - 0.9 %    Immature Granulocytes Absolute, Automated 0.08 0.00 - 0.70 x10*3/uL   KATHI Panel   Result Value Ref Range    Anti-SM 0.3 <1.0 AI    Anti-RNP 0.5 <1.0 AI    Anti-SM/RNP >8.0 (H) <1.0 AI    Anti-SSA 0.5 <1.0 AI    Anti-SSB <0.2 <1.0 AI    Anti-SCL-70 <0.2 <1.0 AI    Anti-RAMEZ-1 IgG <0.2 <1.0 AI    Anti-Chromatin 2.5 (H) <1.0 AI    Anti-Centromere <0.2 <1.0 AI    ANTI-RIBOSOMAL P <0.2 <1.0 AI    Anti-DNA (DS) <1.0 <5.0 IU/mL   C3 complement   Result Value Ref Range    C3 Complement 108 87 - 200 mg/dL   C4 complement   Result Value Ref Range    C4 Complement 54 (H) 10 - 50 mg/dL   Sedimentation rate, automated   Result Value Ref Range    Sedimentation Rate 17 0 - 30 mm/h   Manual Differential   Result Value Ref Range    Neutrophils %, Manual 86.8 40.0 - 80.0 %    Bands %, Manual 5.3 0.0 - 5.0 %    Lymphocytes %, Manual 4.4 13.0 - 44.0 %    Monocytes %, Manual 3.5 2.0 - 10.0 %    Eosinophils %, Manual 0.0 0.0 - 6.0 %    Basophils %, Manual 0.0 0.0 - 2.0 %    Seg Neutrophils Absolute, Manual 3.99 1.20 - 7.00 x10*3/uL    Bands Absolute, Manual 0.24 0.00 - 0.70 x10*3/uL    Lymphocytes Absolute, Manual 0.20 (L) 1.20 - 4.80 x10*3/uL    Monocytes Absolute, Manual 0.16 0.10 - 1.00 x10*3/uL    Eosinophils Absolute, Manual 0.00 0.00 - 0.70 x10*3/uL    Basophils Absolute, Manual 0.00 0.00 - 0.10  x10*3/uL    Total Cells Counted 114     Neutrophils Absolute, Manual 4.23 1.20 - 7.70 x10*3/uL    RBC Morphology No significant RBC morphology present    POCT GLUCOSE   Result Value Ref Range    POCT Glucose 126 (H) 74 - 99 mg/dL   POCT GLUCOSE   Result Value Ref Range    POCT Glucose 144 (H) 74 - 99 mg/dL   POCT GLUCOSE   Result Value Ref Range    POCT Glucose 123 (H) 74 - 99 mg/dL   Vancomycin   Result Value Ref Range    Vancomycin 29.1 (H) 5.0 - 20.0 ug/mL   CBC and Auto Differential   Result Value Ref Range    WBC 3.9 (L) 4.4 - 11.3 x10*3/uL    nRBC 1.3 (H) 0.0 - 0.0 /100 WBCs    RBC 3.08 (L) 4.00 - 5.20 x10*6/uL    Hemoglobin 9.4 (L) 12.0 - 16.0 g/dL    Hematocrit 29.0 (L) 36.0 - 46.0 %    MCV 94 80 - 100 fL    MCH 30.5 26.0 - 34.0 pg    MCHC 32.4 32.0 - 36.0 g/dL    RDW 20.7 (H) 11.5 - 14.5 %    Platelets 78 (L) 150 - 450 x10*3/uL    Neutrophils % 76.8 40.0 - 80.0 %    Immature Granulocytes %, Automated 3.9 (H) 0.0 - 0.9 %    Lymphocytes % 12.6 13.0 - 44.0 %    Monocytes % 6.4 2.0 - 10.0 %    Eosinophils % 0.3 0.0 - 6.0 %    Basophils % 0.0 0.0 - 2.0 %    Neutrophils Absolute 2.98 1.20 - 7.70 x10*3/uL    Immature Granulocytes Absolute, Automated 0.15 0.00 - 0.70 x10*3/uL    Lymphocytes Absolute 0.49 (L) 1.20 - 4.80 x10*3/uL    Monocytes Absolute 0.25 0.10 - 1.00 x10*3/uL    Eosinophils Absolute 0.01 0.00 - 0.70 x10*3/uL    Basophils Absolute 0.00 0.00 - 0.10 x10*3/uL   Hepatic Function Panel   Result Value Ref Range    Albumin 2.7 (L) 3.4 - 5.0 g/dL    Bilirubin, Total 0.3 0.0 - 1.2 mg/dL    Bilirubin, Direct 0.1 0.0 - 0.3 mg/dL    Alkaline Phosphatase 93 33 - 136 U/L    ALT 30 7 - 45 U/L    AST 24 9 - 39 U/L    Total Protein 4.7 (L) 6.4 - 8.2 g/dL   Magnesium   Result Value Ref Range    Magnesium 2.13 1.60 - 2.40 mg/dL   Phosphorus   Result Value Ref Range    Phosphorus 2.4 (L) 2.5 - 4.9 mg/dL   Basic Metabolic Panel   Result Value Ref Range    Glucose 93 74 - 99 mg/dL    Sodium 150 (H) 136 - 145 mmol/L     Potassium 3.4 (L) 3.5 - 5.3 mmol/L    Chloride 113 (H) 98 - 107 mmol/L    Bicarbonate 27 21 - 32 mmol/L    Anion Gap 13 10 - 20 mmol/L    Urea Nitrogen 20 6 - 23 mg/dL    Creatinine 1.31 (H) 0.50 - 1.05 mg/dL    eGFR 46 (L) >60 mL/min/1.73m*2    Calcium 8.1 (L) 8.6 - 10.6 mg/dL   Morphology   Result Value Ref Range    RBC Morphology No significant RBC morphology present    POCT GLUCOSE   Result Value Ref Range    POCT Glucose 95 74 - 99 mg/dL   Red Top   Result Value Ref Range    Extra Tube Hold for add-ons.    POCT GLUCOSE   Result Value Ref Range    POCT Glucose 107 (H) 74 - 99 mg/dL   ECG 12 lead   Result Value Ref Range    Ventricular Rate 51 BPM    Atrial Rate 51 BPM    WA Interval 182 ms    QRS Duration 106 ms    QT Interval 452 ms    QTC Calculation(Bazett) 416 ms    P Axis 58 degrees    R Axis -12 degrees    T Axis -33 degrees    QRS Count 8 beats    Q Onset 214 ms    P Onset 123 ms    P Offset 187 ms    T Offset 440 ms    QTC Fredericia 428 ms   Electrocardiogram, 12-lead PRN ACS symptoms   Result Value Ref Range    Ventricular Rate 65 BPM    Atrial Rate 65 BPM    WA Interval 164 ms    QRS Duration 102 ms    QT Interval 408 ms    QTC Calculation(Bazett) 424 ms    P Axis 64 degrees    R Axis -17 degrees    T Axis 8 degrees    QRS Count 11 beats    Q Onset 214 ms    P Onset 132 ms    P Offset 192 ms    T Offset 418 ms    QTC Fredericia 418 ms   POCT GLUCOSE   Result Value Ref Range    POCT Glucose 154 (H) 74 - 99 mg/dL      Imaging:  CTH wo 1/14  1. No acute intracranial hemorrhage or infarction is evident. If  there remains concern for acute cerebral insult, MRI is recommended.  2. Mucosal thickening in the sinuses with air-fluid level in the  right sphenoid sinus as seen with acute sinusitis.    CT A/P 1/14  IMPRESSION:  1. Cardiomegaly with coronary artery disease.  2. Colonic diverticulosis is present without evidence of acute  diverticulitis.  3. Nonobstructing right-sided nephrolithiasis.    MRI Brain w  and wo 1/15  IMPRESSION:  *There are patchy and confluence regions of abnormal signal in the  periventricular white matter bilaterally. These are nonspecific  findings consistent with demyelination due to ischemic, degenerative  or primary demyelinating processes. *Unchanged previous infarction  left occipital pole *There is no evidence of mass, iacute infarction  or hemorrhage. * There is no measurable change compared to the  previous exam.    Micro:  Bcx 1/14 NGTD  Bcx 1/17 NGTD  Ucx 1/17 NG    C diff PCR positive, EIA negative    Assessment/Plan   Principal Problem:    Meningitis    Bing Holliday is a 63 y.o. 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 initially c/f adrenal crisis from sepsis, but now transferred for inpatient rheum consult iso c/f lupus cerebritis vs meningitis vs adrenal insufficiency as etiology for encephalopathy.      #Encephalopathy  :: ddx  lupus cerebritis (has previous hx of, vs lupus psychosis) vs meningitis vs adrenal insufficiency  :: per daughter, worsening confusion over past year with several admissions since November for AMS (hypoglycemia, UTI, adrenal crisis s/t norovirus). Unclear etiology of current encephalopathy, but consider above plus hypernatremia  -1/15 MRI Brain w and w/o contrast demonstrated focal encephalomalacia in L occipital region unchanged from previous exam. Hemosiderin deposit in L occipital region consistent w/ remote infarction/contusion. Scattered foci w/ increased signal in subcortical and periventricular white matter consistent w/ demyelination (new finding).    -Per neuro periventricular/juxtacortical white matter changes on FLAIR imaging similar to imaging from 03/2023. These changes less suggestive of lupus cerebritis and more suggestive of  chronic age related white matter changes (as minimal to no white matter changes were seen on MRI from 2005)  -vEEG w/o epileptiform activity   -TSH, B12, folate wnl  -anti-dsDNA Ab negative, anti-ribosomal Ab negative  -KATHI panel w/ positive anti-SM/RNP and anti-chromatin ab positive,  C3/C4 wnl  -neurology and rheumatology following  -S/p keppra load, avitan at OSH, keppra continued 500 BID per neuro  -LP completed 1/18, f/u CSF studies (cell count/diff, glucose, protein, cultures, meningitis panel, HSV, oligoclonal bands, IgG index, anti-ribosomal P ab, anti-neuronal Ab, Crypto antigen, Cryptococcal Ab, autoimmune encephalopathy panel)  - c/w ampicillin 2 g q24hr, CTX 2g BID, vancomycin, IV acyclovir 10mg/kg bid for empiric meningitis tx pending above  - Ativan 2 mg IV for prolonged motor seizure lasting more than 3 minutes or a cluster of 3 or more motor seizures in an 8 hour period.    #SLE  - labs as above  - Holding MMF (1/16) in setting of presumed infection  - Continue on stress dose steroids as above  - Rheumatology is following.  Further recommendations based on results of LP     #Hypernatremia  -Na 148-152  -likely due to NPO status  -stop IVFs, resume Tfs and FWFs 240ml q4h  -calculate daily free water deficit and adjust FWFs  -BID RFP     #c diff, recurrent episode?  (tx'ed w/PO vanc pulse dose in 3/2023 with taper, metronidazole 2/2023)  - as per chart review, has chronic diarrhea with IBS-type picture?  - diarrhea with positive PCR positive, EIA negative 1/18  - PCR was positive 5/2022, 10/2022, 2/2023. 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  -endocrine following  -on chronic prednisone (10mg + 5mg)   -Low concern for adrenal crisis  -stress dose hydrocortisone 25mg q6hrs ([id number] on 1/14) in light of presumed infection    #Paroxysmal Atrial fibrillation on eliquis  #Intermittent  sinus bradycardia  -Chadsvasc 4  -DOAC held 1/15 in anticipation of LP, started SQH for dvt ppx  -continue telemetry    #Chronic Pancytopenia  - likely s/t lupus  - Retic count 0.02 (LLN 0.018), TIBC low, % sat low, ferritin elevated 1/16  - Has required 2 units of packed red blood cells on 1/16 1/17  - Hemoglobin goal greater than 7, platelets greater than 10, if bleeding platelets greater than 50.    #JED on CKD 3, resolved  - Baseline appears to be to be between 1.6 and 1.8, Cr trend 2.24 -> 1.28  - Improving since admission with fluids    #CAD s/p CABG 2013  -statin     F: Replete PRN  E: Replete PRN  N: TFs via DHT  DVT Ppx: resume 4h after LP  GI Ppx: Esomeprazole  Access/Lines: 2 PIV, midline 1/16/ [place] 1/16     Code status: DNR and No Intubation  Emergency contact: López Lang (son) [phone] (Home Phone)     Jeniffer Vasquez MD  Internal Medicine, PGY1

## 2024-01-18 NOTE — POST-PROCEDURE NOTE
Fluoroscopic Guided Lumbar Puncture Postprocedure Note    Attending: Rodo    Fellow: Jonelle    Diagnosis:   1. Meningitis            Description of procedure: Written and verbal informed consent was obtained from the patient's  and son. The patient was placed in the prone position on the exam table. A timeout was performed prior to the procedure. Using fluoroscopic guidance, appropriate anatomic landmarks were identified and surgical site was marked. Site was prepped and draped in the usual sterile fashion. Local anesthesia was obtained using approximately 5 mL 1% Lidocaine.  Under intermittent fluoroscopic guidance, a 20 Gauge  3.5 inch spinal needle was advanced into the thecal sac at the L2-L3 until return of cerebral spinal fluid.    Opening pressure was not obtained.    A total of 9 mL clear cerebral spinal  fluid was collected and submitted to the lab for analysis.    The stylet was replaced and the needle was removed.    The patient tolerated procedure well without any immediate or clinically apparent complications.      The collected CSF was then sent to the lab for appropriate lab tests as ordered by the referring physician.    Estimated Blood Loss: None.    IMPRESSION:   Successful fluoroscopic guided lumbar puncture. See detailed result report with images in PACS.    The patient tolerated the procedure well without incident or complication and is in stable condition.

## 2024-01-18 NOTE — CARE PLAN
Problem: Safety - Medical Restraint  Goal: Remains free of injury from restraints (Restraint for Interference with Medical Device)  Outcome: Progressing  Goal: Free from restraint(s) (Restraint for Interference with Medical Device)  Outcome: Progressing

## 2024-01-18 NOTE — CONSULTS
Inpatient consult to Infectious Diseases  Consult performed by: Delano Bustos MD  Consult ordered by: Carol CARTY MD        Please refer to consult note written on 1/17

## 2024-01-18 NOTE — PROGRESS NOTES
Physical Therapy                 Therapy Communication Note    Patient Name: Bing Holliday  MRN: [id number]  Today's Date: 1/18/2024     Discipline: Physical Therapy    Missed Visit Reason: Missed Visit Reason:  (patient planning for LP this date (r/o meningitis); PT will hold at this time and re-attempt as time and schedule allows and as medically appropriate.)    Missed Time: Attempt    Comment:

## 2024-01-18 NOTE — PROCEDURES
Indications, risks, and benefits explained to patient/ surrogate decision maker and informed consent obtained. Patient positioned, prepped and draped in usual sterile fashion. L4 space located using bilateral iliac crests as landmarks. 1% Lidocaine without epinephrine was used to anesthetize the area. A 22 gauge spinal needle was introduced. Fluid was unable to be obtained. Will pursue LP tomorrow.    Dr. Quigley was present for the entire procedure.    Yassine Maradiaga MD

## 2024-01-19 ENCOUNTER — APPOINTMENT (OUTPATIENT)
Dept: CARDIOLOGY | Facility: HOSPITAL | Age: 64
DRG: 101 | End: 2024-01-19
Payer: MEDICARE

## 2024-01-19 LAB
ALBUMIN SERPL BCP-MCNC: 2.3 G/DL (ref 3.4–5)
ALBUMIN SERPL BCP-MCNC: 2.4 G/DL (ref 3.4–5)
ALBUMIN SERPL BCP-MCNC: 2.5 G/DL (ref 3.4–5)
ALP SERPL-CCNC: 87 U/L (ref 33–136)
ALT SERPL W P-5'-P-CCNC: 24 U/L (ref 7–45)
ANION GAP SERPL CALC-SCNC: 10 MMOL/L (ref 10–20)
ANION GAP SERPL CALC-SCNC: 12 MMOL/L (ref 10–20)
AST SERPL W P-5'-P-CCNC: 21 U/L (ref 9–39)
BACTERIA BLD CULT: NORMAL
BACTERIA BLD CULT: NORMAL
BASOPHILS # BLD AUTO: ABNORMAL 10*3/UL
BASOPHILS NFR BLD AUTO: ABNORMAL %
BILIRUB DIRECT SERPL-MCNC: 0.1 MG/DL (ref 0–0.3)
BILIRUB SERPL-MCNC: 0.3 MG/DL (ref 0–1.2)
BUN SERPL-MCNC: 15 MG/DL (ref 6–23)
BUN SERPL-MCNC: 16 MG/DL (ref 6–23)
C GATTII+NEOFOR DNA CSF QL NAA+NON-PROBE: NOT DETECTED
CALCIUM SERPL-MCNC: 7.4 MG/DL (ref 8.6–10.6)
CALCIUM SERPL-MCNC: 7.6 MG/DL (ref 8.6–10.6)
CHLORIDE SERPL-SCNC: 109 MMOL/L (ref 98–107)
CHLORIDE SERPL-SCNC: 109 MMOL/L (ref 98–107)
CMV DNA CSF QL NAA+NON-PROBE: NOT DETECTED
CO2 SERPL-SCNC: 29 MMOL/L (ref 21–32)
CO2 SERPL-SCNC: 30 MMOL/L (ref 21–32)
COLOR CSF: COLORLESS
CREAT SERPL-MCNC: 1.37 MG/DL (ref 0.5–1.05)
CREAT SERPL-MCNC: 1.46 MG/DL (ref 0.5–1.05)
CRYPTOC AG SPEC QL LA: NEGATIVE
E COLI K1 DNA CSF QL NAA+NON-PROBE: NOT DETECTED
EGFRCR SERPLBLD CKD-EPI 2021: 41 ML/MIN/1.73M*2
EGFRCR SERPLBLD CKD-EPI 2021: 44 ML/MIN/1.73M*2
EOSINOPHIL # BLD AUTO: ABNORMAL 10*3/UL
EOSINOPHIL NFR BLD AUTO: ABNORMAL %
ERYTHROCYTE [DISTWIDTH] IN BLOOD BY AUTOMATED COUNT: 19.9 % (ref 11.5–14.5)
EV RNA CSF QL NAA+NON-PROBE: NOT DETECTED
GLUCOSE BLD MANUAL STRIP-MCNC: 111 MG/DL (ref 74–99)
GLUCOSE BLD MANUAL STRIP-MCNC: 114 MG/DL (ref 74–99)
GLUCOSE BLD MANUAL STRIP-MCNC: 139 MG/DL (ref 74–99)
GLUCOSE BLD MANUAL STRIP-MCNC: 143 MG/DL (ref 74–99)
GLUCOSE BLD MANUAL STRIP-MCNC: 193 MG/DL (ref 74–99)
GLUCOSE SERPL-MCNC: 138 MG/DL (ref 74–99)
GLUCOSE SERPL-MCNC: 147 MG/DL (ref 74–99)
GP B STREP DNA CSF QL NAA+NON-PROBE: NOT DETECTED
HAEM INFLU DNA CSF QL NAA+NON-PROBE: NOT DETECTED
HCT VFR BLD AUTO: 28.1 % (ref 36–46)
HGB BLD-MCNC: 8.5 G/DL (ref 12–16)
HHV6 DNA CSF QL NAA+NON-PROBE: NOT DETECTED
HSV1 DNA CSF QL NAA+NON-PROBE: NOT DETECTED
HSV1 DNA CSF QL NAA+PROBE: NOT DETECTED
HSV2 DNA CSF QL NAA+NON-PROBE: NOT DETECTED
HSV2 DNA CSF QL NAA+PROBE: NOT DETECTED
IMM GRANULOCYTES # BLD AUTO: 0.16 X10*3/UL (ref 0–0.7)
IMM GRANULOCYTES NFR BLD AUTO: 4.4 % (ref 0–0.9)
L MONOCYTOG DNA CSF QL NAA+NON-PROBE: NOT DETECTED
LYMPHOCYTES # BLD AUTO: ABNORMAL 10*3/UL
LYMPHOCYTES NFR BLD AUTO: ABNORMAL %
MAGNESIUM SERPL-MCNC: 1.81 MG/DL (ref 1.6–2.4)
MAGNESIUM SERPL-MCNC: 2.34 MG/DL (ref 1.6–2.4)
MCH RBC QN AUTO: 28.3 PG (ref 26–34)
MCHC RBC AUTO-ENTMCNC: 30.2 G/DL (ref 32–36)
MCV RBC AUTO: 94 FL (ref 80–100)
MONOCYTES # BLD AUTO: ABNORMAL 10*3/UL
MONOCYTES NFR BLD AUTO: ABNORMAL %
N MEN DNA CSF QL NAA+NON-PROBE: NOT DETECTED
NEUTROPHILS # BLD AUTO: ABNORMAL 10*3/UL
NEUTROPHILS NFR BLD AUTO: ABNORMAL %
NRBC BLD-RTO: 3 /100 WBCS (ref 0–0)
PARECHOVIRUS A RNA CSF QL NAA+NON-PROBE: NOT DETECTED
PATH REVIEW-CELL CT,CSF: NORMAL
PHOSPHATE SERPL-MCNC: 2.5 MG/DL (ref 2.5–4.9)
PHOSPHATE SERPL-MCNC: 2.7 MG/DL (ref 2.5–4.9)
PHOSPHATE SERPL-MCNC: 3.5 MG/DL (ref 2.5–4.9)
PLATELET # BLD AUTO: 71 X10*3/UL (ref 150–450)
POTASSIUM SERPL-SCNC: 3.3 MMOL/L (ref 3.5–5.3)
POTASSIUM SERPL-SCNC: 3.7 MMOL/L (ref 3.5–5.3)
PROT CSF-MCNC: 52 MG/DL (ref 15–45)
PROT SERPL-MCNC: 4 G/DL (ref 6.4–8.2)
RBC # BLD AUTO: 3 X10*6/UL (ref 4–5.2)
S PNEUM DNA CSF QL NAA+NON-PROBE: NOT DETECTED
SODIUM SERPL-SCNC: 146 MMOL/L (ref 136–145)
SODIUM SERPL-SCNC: 146 MMOL/L (ref 136–145)
STAPHYLOCOCCUS SPEC CULT: NORMAL
VZV DNA CSF QL NAA+NON-PROBE: NOT DETECTED
WBC # BLD AUTO: 3.6 X10*3/UL (ref 4.4–11.3)

## 2024-01-19 PROCEDURE — 84100 ASSAY OF PHOSPHORUS: CPT

## 2024-01-19 PROCEDURE — 2500000005 HC RX 250 GENERAL PHARMACY W/O HCPCS

## 2024-01-19 PROCEDURE — 82040 ASSAY OF SERUM ALBUMIN: CPT

## 2024-01-19 PROCEDURE — 83735 ASSAY OF MAGNESIUM: CPT

## 2024-01-19 PROCEDURE — 2500000001 HC RX 250 WO HCPCS SELF ADMINISTERED DRUGS (ALT 637 FOR MEDICARE OP)

## 2024-01-19 PROCEDURE — 80069 RENAL FUNCTION PANEL: CPT | Mod: CCI | Performed by: STUDENT IN AN ORGANIZED HEALTH CARE EDUCATION/TRAINING PROGRAM

## 2024-01-19 PROCEDURE — 2500000004 HC RX 250 GENERAL PHARMACY W/ HCPCS (ALT 636 FOR OP/ED)

## 2024-01-19 PROCEDURE — 80053 COMPREHEN METABOLIC PANEL: CPT

## 2024-01-19 PROCEDURE — 82947 ASSAY GLUCOSE BLOOD QUANT: CPT

## 2024-01-19 PROCEDURE — 1200000002 HC GENERAL ROOM WITH TELEMETRY DAILY

## 2024-01-19 PROCEDURE — 99232 SBSQ HOSP IP/OBS MODERATE 35: CPT

## 2024-01-19 PROCEDURE — 93005 ELECTROCARDIOGRAM TRACING: CPT

## 2024-01-19 PROCEDURE — 2500000002 HC RX 250 W HCPCS SELF ADMINISTERED DRUGS (ALT 637 FOR MEDICARE OP, ALT 636 FOR OP/ED)

## 2024-01-19 RX ORDER — POTASSIUM CHLORIDE 1.5 G/1.58G
40 POWDER, FOR SOLUTION ORAL ONCE
Status: COMPLETED | OUTPATIENT
Start: 2024-01-19 | End: 2024-01-19

## 2024-01-19 RX ORDER — MAGNESIUM SULFATE HEPTAHYDRATE 40 MG/ML
2 INJECTION, SOLUTION INTRAVENOUS ONCE
Status: COMPLETED | OUTPATIENT
Start: 2024-01-19 | End: 2024-01-19

## 2024-01-19 RX ORDER — FLUTICASONE FUROATE AND VILANTEROL 100; 25 UG/1; UG/1
1 POWDER RESPIRATORY (INHALATION)
Status: CANCELLED | OUTPATIENT
Start: 2024-01-19

## 2024-01-19 RX ORDER — ACETAMINOPHEN 500 MG
5 TABLET ORAL NIGHTLY
Status: CANCELLED | OUTPATIENT
Start: 2024-01-19

## 2024-01-19 RX ADMIN — AMPICILLIN SODIUM 2 G: 2 INJECTION, POWDER, FOR SOLUTION INTRAVENOUS at 03:45

## 2024-01-19 RX ADMIN — ACYCLOVIR SODIUM 650 MG: 50 INJECTION, SOLUTION INTRAVENOUS at 05:19

## 2024-01-19 RX ADMIN — MAGNESIUM SULFATE HEPTAHYDRATE 2 G: 40 INJECTION, SOLUTION INTRAVENOUS at 00:21

## 2024-01-19 RX ADMIN — THIAMINE HCL TAB 100 MG 100 MG: 100 TAB at 08:40

## 2024-01-19 RX ADMIN — HEPARIN SODIUM 5000 UNITS: 5000 INJECTION INTRAVENOUS; SUBCUTANEOUS at 16:07

## 2024-01-19 RX ADMIN — POTASSIUM PHOSPHATE, MONOBASIC AND POTASSIUM PHOSPHATE, DIBASIC 21 MMOL: 224; 236 INJECTION, SOLUTION, CONCENTRATE INTRAVENOUS at 02:30

## 2024-01-19 RX ADMIN — HYDROCORTISONE SODIUM SUCCINATE 25 MG: 100 INJECTION, POWDER, FOR SOLUTION INTRAMUSCULAR; INTRAVENOUS at 05:17

## 2024-01-19 RX ADMIN — HEPARIN SODIUM 5000 UNITS: 5000 INJECTION INTRAVENOUS; SUBCUTANEOUS at 00:23

## 2024-01-19 RX ADMIN — DEXTROSE MONOHYDRATE 100 ML/HR: 50 INJECTION, SOLUTION INTRAVENOUS at 01:29

## 2024-01-19 RX ADMIN — LEVETIRACETAM 500 MG: 5 INJECTION INTRAVENOUS at 10:51

## 2024-01-19 RX ADMIN — ESOMEPRAZOLE MAGNESIUM 40 MG: 40 FOR SUSPENSION ORAL at 08:48

## 2024-01-19 RX ADMIN — LEVETIRACETAM 500 MG: 5 INJECTION INTRAVENOUS at 22:40

## 2024-01-19 RX ADMIN — DEXTROSE MONOHYDRATE 100 ML/HR: 50 INJECTION, SOLUTION INTRAVENOUS at 03:35

## 2024-01-19 RX ADMIN — DEXTROSE MONOHYDRATE 100 ML/HR: 50 INJECTION, SOLUTION INTRAVENOUS at 03:33

## 2024-01-19 RX ADMIN — ACYCLOVIR SODIUM 650 MG: 50 INJECTION, SOLUTION INTRAVENOUS at 13:22

## 2024-01-19 RX ADMIN — DEXTROSE MONOHYDRATE 100 ML/HR: 50 INJECTION, SOLUTION INTRAVENOUS at 03:36

## 2024-01-19 RX ADMIN — VANCOMYCIN HYDROCHLORIDE 125 MG: KIT at 03:40

## 2024-01-19 RX ADMIN — ACYCLOVIR SODIUM 650 MG: 50 INJECTION, SOLUTION INTRAVENOUS at 21:17

## 2024-01-19 RX ADMIN — HEPARIN SODIUM 5000 UNITS: 5000 INJECTION INTRAVENOUS; SUBCUTANEOUS at 08:39

## 2024-01-19 RX ADMIN — POTASSIUM CHLORIDE 40 MEQ: 1.5 POWDER, FOR SOLUTION ORAL at 22:41

## 2024-01-19 RX ADMIN — FOLIC ACID 1 MG: 1 TABLET ORAL at 08:40

## 2024-01-19 RX ADMIN — HYDROCORTISONE SODIUM SUCCINATE 25 MG: 100 INJECTION, POWDER, FOR SOLUTION INTRAMUSCULAR; INTRAVENOUS at 13:22

## 2024-01-19 RX ADMIN — MAGNESIUM SULFATE HEPTAHYDRATE 2 G: 40 INJECTION, SOLUTION INTRAVENOUS at 13:57

## 2024-01-19 RX ADMIN — POTASSIUM CHLORIDE 40 MEQ: 1.5 POWDER, FOR SOLUTION ORAL at 01:36

## 2024-01-19 RX ADMIN — VANCOMYCIN HYDROCHLORIDE 125 MG: KIT at 08:40

## 2024-01-19 RX ADMIN — HYDROCORTISONE SODIUM SUCCINATE 15 MG: 100 INJECTION, POWDER, FOR SOLUTION INTRAMUSCULAR; INTRAVENOUS at 19:39

## 2024-01-19 RX ADMIN — ACETAMINOPHEN 650 MG: 325 TABLET ORAL at 21:17

## 2024-01-19 RX ADMIN — INSULIN LISPRO 2 UNITS: 100 INJECTION, SOLUTION INTRAVENOUS; SUBCUTANEOUS at 04:57

## 2024-01-19 ASSESSMENT — COGNITIVE AND FUNCTIONAL STATUS - GENERAL
DRESSING REGULAR LOWER BODY CLOTHING: TOTAL
MOVING TO AND FROM BED TO CHAIR: TOTAL
DRESSING REGULAR UPPER BODY CLOTHING: TOTAL
PERSONAL GROOMING: TOTAL
TOILETING: TOTAL
EATING MEALS: TOTAL
STANDING UP FROM CHAIR USING ARMS: TOTAL
EATING MEALS: TOTAL
DAILY ACTIVITIY SCORE: 6
WALKING IN HOSPITAL ROOM: TOTAL
CLIMB 3 TO 5 STEPS WITH RAILING: TOTAL
CLIMB 3 TO 5 STEPS WITH RAILING: TOTAL
DAILY ACTIVITIY SCORE: 6
MOVING FROM LYING ON BACK TO SITTING ON SIDE OF FLAT BED WITH BEDRAILS: TOTAL
MOBILITY SCORE: 6
MOVING TO AND FROM BED TO CHAIR: TOTAL
TURNING FROM BACK TO SIDE WHILE IN FLAT BAD: TOTAL
PERSONAL GROOMING: TOTAL
HELP NEEDED FOR BATHING: TOTAL
STANDING UP FROM CHAIR USING ARMS: TOTAL
HELP NEEDED FOR BATHING: TOTAL
DRESSING REGULAR LOWER BODY CLOTHING: TOTAL
TOILETING: TOTAL
DRESSING REGULAR UPPER BODY CLOTHING: TOTAL
WALKING IN HOSPITAL ROOM: TOTAL
TURNING FROM BACK TO SIDE WHILE IN FLAT BAD: TOTAL
MOBILITY SCORE: 6
MOVING FROM LYING ON BACK TO SITTING ON SIDE OF FLAT BED WITH BEDRAILS: TOTAL

## 2024-01-19 ASSESSMENT — PAIN DESCRIPTION - ORIENTATION: ORIENTATION: MID

## 2024-01-19 ASSESSMENT — PAIN SCALES - WONG BAKER: WONGBAKER_NUMERICALRESPONSE: NO HURT

## 2024-01-19 ASSESSMENT — PAIN - FUNCTIONAL ASSESSMENT: PAIN_FUNCTIONAL_ASSESSMENT: 0-10

## 2024-01-19 ASSESSMENT — PAIN SCALES - GENERAL
PAINLEVEL_OUTOF10: 3
PAINLEVEL_OUTOF10: 0 - NO PAIN

## 2024-01-19 ASSESSMENT — PAIN DESCRIPTION - LOCATION: LOCATION: BACK

## 2024-01-19 NOTE — PROGRESS NOTES
Bing Holliday is a 63 y.o. female on day 2 of admission presenting with Meningitis.    Subjective   Electrolytes repleted overnight. D5 dip started for hypernatremia.     Patient Aox1-2 on exam today, but answers questions and interacts. Denies pain, SOB, f/c/, HA or vision changes.     Objective     Physical Exam  Gen: laying in bed rocking slightly with eyes closed   HEENT: EOMI, PERRL  Pulm: CTAB, no increased WOB  CV: RRR, no murmurs  GI: abdomen soft, nontender, nondistended, no rebound or guarding  Perfusion/Volume status: no LE edema, warm  Neuro: strength and sensation intact diffusely, CN II-XII grossly intact. Alert and oriented to person and place.  MSK: able to touch chin to chest w/o difficulty  Psych: pleasant, confused    Last Recorded Vitals  Blood pressure 134/72, pulse 69, temperature 36.5 °C (97.7 °F), resp. rate 17, weight 99.3 kg (218 lb 14.7 oz), SpO2 93 %.  Intake/Output last 3 Shifts:  I/O last 3 completed shifts:  In: 5174 (52.1 mL/kg) [P.O.:60; I.V.:1115 (11.2 mL/kg); NG/GT:940; IV Piggyback:3059]  Out: 5205 (52.4 mL/kg) [Urine:5205 (1.5 mL/kg/hr)]  Dosing Weight: 99.3 kg     Relevant Results    Results for orders placed or performed during the hospital encounter of 01/17/24 (from the past 24 hour(s))   Glucose, CSF   Result Value Ref Range    Glucose, CSF 83 (H) 40 - 70 mg/dL   CSF Cell Count   Result Value Ref Range    Tube Number, CSF Tube 1       Color, CSF Colorless Colorless    Clarity, CSF Clear Clear    Color, Supernatant CSF Colorless      WBC, CSF 4 1 - 5 /uL    RBC, CSF 72 (H) 0 - 5 /uL   CSF Differential   Result Value Ref Range    Neutrophils %, Manual, CSF 2 0 - 5 %    Lymphocytes %, Manual, CSF 58 28 - 96 %    Mono/Macrophages %, Manual, CSF 40 16 - 56 %    Eosinophils %, Manual, CSF 0 Rare %    Basophils %, Manual, CSF 0 Not Established %    Immature Granulocytes %, Manual, CSF 0 Not Established %    Blasts %, Manual, CSF 0 Not Established %    Unclassified Cells %,  Manual, CSF 0 Not Established %    Plasma Cells %, Manual, CSF 0 Not Established %    Total Cells Counted,     Cryptococcal Antigen, CSF/Serum    Specimen: Lumbar Puncture; Cerebrospinal Fluid   Result Value Ref Range    Cryptococcal AG, Qual Negative Negative, Invalid   Protein, CSF   Result Value Ref Range    Total Protein, CSF 52 (H) 15 - 45 mg/dL   POCT GLUCOSE   Result Value Ref Range    POCT Glucose 154 (H) 74 - 99 mg/dL   Renal function panel   Result Value Ref Range    Glucose 662 (HH) 74 - 99 mg/dL    Sodium 139 136 - 145 mmol/L    Potassium 8.2 (HH) 3.5 - 5.3 mmol/L    Chloride 103 98 - 107 mmol/L    Bicarbonate 25 21 - 32 mmol/L    Anion Gap 19 10 - 20 mmol/L    Urea Nitrogen 15 6 - 23 mg/dL    Creatinine 1.28 (H) 0.50 - 1.05 mg/dL    eGFR 47 (L) >60 mL/min/1.73m*2    Calcium 7.0 (L) 8.6 - 10.6 mg/dL    Phosphorus 13.8 (H) 2.5 - 4.9 mg/dL    Albumin 2.4 (L) 3.4 - 5.0 g/dL   POCT GLUCOSE   Result Value Ref Range    POCT Glucose 144 (H) 74 - 99 mg/dL   POCT GLUCOSE   Result Value Ref Range    POCT Glucose 126 (H) 74 - 99 mg/dL   Blood Gas Venous Full Panel   Result Value Ref Range    POCT pH, Venous 7.45 (H) 7.33 - 7.43 pH    POCT pCO2, Venous 40 (L) 41 - 51 mm Hg    POCT pO2, Venous 48 (H) 35 - 45 mm Hg    POCT SO2, Venous 79 (H) 45 - 75 %    POCT Oxy Hemoglobin, Venous 78.4 (H) 45.0 - 75.0 %    POCT Hematocrit Calculated, Venous 28.0 (L) 36.0 - 46.0 %    POCT Sodium, Venous 146 (H) 136 - 145 mmol/L    POCT Potassium, Venous 3.4 (L) 3.5 - 5.3 mmol/L    POCT Chloride, Venous 114 (H) 98 - 107 mmol/L    POCT Ionized Calicum, Venous 1.13 1.10 - 1.33 mmol/L    POCT Glucose, Venous 142 (H) 74 - 99 mg/dL    POCT Lactate, Venous 1.0 0.4 - 2.0 mmol/L    POCT Base Excess, Venous 3.5 (H) -2.0 - 3.0 mmol/L    POCT HCO3 Calculated, Venous 27.8 (H) 22.0 - 26.0 mmol/L    POCT Hemoglobin, Venous 9.4 (L) 12.0 - 16.0 g/dL    POCT Anion Gap, Venous 8.0 (L) 10.0 - 25.0 mmol/L    Patient Temperature 37.0 degrees Celsius     FiO2 96 %   POCT GLUCOSE   Result Value Ref Range    POCT Glucose 122 (H) 74 - 99 mg/dL   Renal function panel   Result Value Ref Range    Glucose 111 (H) 74 - 99 mg/dL    Sodium 150 (H) 136 - 145 mmol/L    Potassium 3.2 (L) 3.5 - 5.3 mmol/L    Chloride 113 (H) 98 - 107 mmol/L    Bicarbonate 28 21 - 32 mmol/L    Anion Gap 12 10 - 20 mmol/L    Urea Nitrogen 16 6 - 23 mg/dL    Creatinine 1.27 (H) 0.50 - 1.05 mg/dL    eGFR 48 (L) >60 mL/min/1.73m*2    Calcium 7.6 (L) 8.6 - 10.6 mg/dL    Phosphorus 2.3 (L) 2.5 - 4.9 mg/dL    Albumin 2.5 (L) 3.4 - 5.0 g/dL   Magnesium   Result Value Ref Range    Magnesium 1.58 (L) 1.60 - 2.40 mg/dL   POCT GLUCOSE   Result Value Ref Range    POCT Glucose 151 (H) 74 - 99 mg/dL   POCT GLUCOSE   Result Value Ref Range    POCT Glucose 193 (H) 74 - 99 mg/dL   CBC and Auto Differential   Result Value Ref Range    WBC 3.6 (L) 4.4 - 11.3 x10*3/uL    nRBC 3.0 (H) 0.0 - 0.0 /100 WBCs    RBC 3.00 (L) 4.00 - 5.20 x10*6/uL    Hemoglobin 8.5 (L) 12.0 - 16.0 g/dL    Hematocrit 28.1 (L) 36.0 - 46.0 %    MCV 94 80 - 100 fL    MCH 28.3 26.0 - 34.0 pg    MCHC 30.2 (L) 32.0 - 36.0 g/dL    RDW 19.9 (H) 11.5 - 14.5 %    Platelets 71 (L) 150 - 450 x10*3/uL    Neutrophils %      Immature Granulocytes %, Automated 4.4 (H) 0.0 - 0.9 %    Lymphocytes %      Monocytes %      Eosinophils %      Basophils %      Neutrophils Absolute      Immature Granulocytes Absolute, Automated 0.16 0.00 - 0.70 x10*3/uL    Lymphocytes Absolute      Monocytes Absolute      Eosinophils Absolute      Basophils Absolute     Hepatic Function Panel   Result Value Ref Range    Albumin 2.3 (L) 3.4 - 5.0 g/dL    Bilirubin, Total 0.3 0.0 - 1.2 mg/dL    Bilirubin, Direct 0.1 0.0 - 0.3 mg/dL    Alkaline Phosphatase 87 33 - 136 U/L    ALT 24 7 - 45 U/L    AST 21 9 - 39 U/L    Total Protein 4.0 (L) 6.4 - 8.2 g/dL   Phosphorus   Result Value Ref Range    Phosphorus 2.7 2.5 - 4.9 mg/dL   POCT GLUCOSE   Result Value Ref Range    POCT Glucose 111  (H) 74 - 99 mg/dL   ECG 12 lead   Result Value Ref Range    Ventricular Rate 62 BPM    Atrial Rate 62 BPM    AL Interval 150 ms    QRS Duration 98 ms    QT Interval 408 ms    QTC Calculation(Bazett) 414 ms    P Axis 38 degrees    R Axis -17 degrees    T Axis -38 degrees    QRS Count 10 beats    Q Onset 217 ms    P Onset 142 ms    P Offset 195 ms    T Offset 421 ms    QTC Fredericia 412 ms      Imaging:  CTH wo 1/14  1. No acute intracranial hemorrhage or infarction is evident. If  there remains concern for acute cerebral insult, MRI is recommended.  2. Mucosal thickening in the sinuses with air-fluid level in the  right sphenoid sinus as seen with acute sinusitis.    CT A/P 1/14  IMPRESSION:  1. Cardiomegaly with coronary artery disease.  2. Colonic diverticulosis is present without evidence of acute  diverticulitis.  3. Nonobstructing right-sided nephrolithiasis.    MRI Brain w and wo 1/15  IMPRESSION:  *There are patchy and confluence regions of abnormal signal in the  periventricular white matter bilaterally. These are nonspecific  findings consistent with demyelination due to ischemic, degenerative  or primary demyelinating processes. *Unchanged previous infarction  left occipital pole *There is no evidence of mass, iacute infarction  or hemorrhage. * There is no measurable change compared to the  previous exam.    Micro:  Bcx 1/14 NGTD  Bcx 1/17 NGTD  Ucx 1/17 NG    C diff PCR positive, EIA negative    Assessment/Plan   Principal Problem:    Meningitis    Bing Holliday is a 63 y.o. 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 initially c/f adrenal crisis from sepsis, but now transferred for inpatient rheum consult iso c/f lupus cerebritis vs meningitis vs adrenal insufficiency as etiology for  encephalopathy.    Updates:  -FWFs dropped to 280 q4 yesterday, s/p 1.2 L D5 1/2 NS overnight  -CSF glucose normal, wbc not elevated (but patient known chronic pancytopenia)  -stopped IV vanc/CTX/ampicillin per ID given low concern for bacterial meningitis  -touch base w/ neurology about keppra, need for repeat imaging  -continue to hold cellcept?  -stop PO vanc     #Encephalopathy  :: ddx NPSLE vs encephalitis vs adrenal insufficiency  :: per daughter, worsening confusion over past year with several admissions since November for AMS (hypoglycemia, UTI, adrenal crisis s/t norovirus). Unclear etiology of current encephalopathy, but consider above plus hypernatremia  -1/15 MRI Brain w and w/o contrast demonstrated focal encephalomalacia in L occipital region unchanged from previous exam. Hemosiderin deposit in L occipital region consistent w/ remote infarction/contusion. Scattered foci w/ increased signal in subcortical and periventricular white matter consistent w/ demyelination (new finding).    -Per neuro periventricular/juxtacortical white matter changes on FLAIR imaging similar to imaging from 03/2023. These changes less suggestive of lupus cerebritis and more suggestive of chronic age related white matter changes (as minimal to no white matter changes were seen on MRI from 2005)  -vEEG w/o epileptiform activity   -TSH, B12, folate wnl  -anti-dsDNA Ab negative, anti-ribosomal Ab negative  -KATHI panel w/ positive anti-SM/RNP and anti-chromatin ab positive,  C3/C4 wnl  -LP completed 1/18   -CSF glucose normal, wbc 4, Crypto antigen negative  -cultures, protein, HSV PCR, anti-ribosomal P ab, anti-neuronal Ab, autoimmune encephalopathy panel,  oligoclonal bands, IgG index, Cryptococcal Ab pending  -per rheum, normal rheumatologic work-up goes against active SLE (lupus cerebritis)  -per neuro, less likely lupus cerebritis, consider Gbs given areflexia dn recent GI illness  -per ID, subacute onset of sxs + normal  glucose against bacterial meningitis, more consistent w/ encephalitis  -S/p keppra load, avitan at OSH, keppra continued 500 BID per neuro  -stop empiric meningitis tx, continue IV acyclovir  - Ativan 2 mg IV for prolonged motor seizure lasting more than 3 minutes or a cluster of 3 or more motor seizures in an 8 hour period.    #SLE  - labs as above  - Holding MMF (1/16) in setting of presumed infection  - Continue on stress dose steroids as below  - Rheumatology is following. Further recommendations based on results of LP     #Hypernatremia  -Na 148-152  -likely due to NPO status  -stop IVFs, resume Tfs and FWFs 240ml q4h  -calculate daily free water deficit and adjust FWFs  -BID RFP     #c diff, recurrent episode?  (tx'ed w/PO vanc pulse dose in 3/2023 with taper, metronidazole 2/2023)  - as per chart review, has chronic diarrhea with IBS-type picture?  - diarrhea with positive PCR positive, EIA negative 1/18  - PCR was positive 5/2022, 10/2022, 2/2023. 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  -endocrine following  -on chronic prednisone (10mg + 5mg)   -Low concern for adrenal crisis  -stress dose hydrocortisone 25mg q6hrs ([id number] on 1/14) in light of presumed infection    #Paroxysmal Atrial fibrillation on eliquis  #Intermittent sinus bradycardia  -Chadsvasc 4  -DOAC held 1/15 in anticipation of LP, started SQH for dvt ppx  -continue telemetry    #Chronic Pancytopenia  - likely s/t lupus  - Retic count 0.02 (LLN 0.018), TIBC low, % sat low, ferritin elevated 1/16  - Has required 2 units of packed red blood cells on 1/16 1/17  - Hemoglobin goal greater than 7, platelets greater than 10, if bleeding platelets greater than 50.    #JED on CKD 3, resolved  - Baseline appears to be to be between 1.6 and 1.8, Cr trend 2.24 -> 1.28  - Improved with fluids    #CAD s/p CABG  2013  -statin     F: Replete PRN  E: Replete PRN  N: TFs via DHT  DVT Ppx: SQH  GI Ppx: Esomeprazole  Access/Lines: 2 PIV, midline 1/16/ [place] 1/16     Code status: DNR and No Intubation  Emergency contact: López Lang (son) [phone] (Home Phone)     Jeniffer Vasquez MD  Internal Medicine, PGY1

## 2024-01-19 NOTE — PROGRESS NOTES
Bing Holliday is a 63 y.o. female on day 2 of admission presenting with Meningitis.    Subjective   Patient was not seen or examined today.  Only chart was reviewed.  Patient was transferred from the ICU to regular nursing floor on 1/18, with some improvement in mentation and orientation.       Objective   Not done today, per primary team patient is AO x 2 from AO x 0 upon presentation    Last Recorded Vitals  Blood pressure 131/81, pulse 73, temperature 36.7 °C (98.1 °F), resp. rate 18, weight 99.3 kg (218 lb 14.7 oz), SpO2 98 %.  Intake/Output last 3 Shifts:  I/O last 3 completed shifts:  In: 5174 (52.1 mL/kg) [P.O.:60; I.V.:1115 (11.2 mL/kg); NG/GT:940; IV Piggyback:3059]  Out: 5205 (52.4 mL/kg) [Urine:5205 (1.5 mL/kg/hr)]  Dosing Weight: 99.3 kg     Relevant Results  Results from last 7 days   Lab Units 01/19/24  1605 01/19/24  1149 01/19/24  1105 01/19/24  0739 01/19/24  0430 01/18/24  2347 01/18/24  2231 01/18/24  1532 01/18/24  1431 01/18/24  0342 01/18/24  0338 01/17/24  2045 01/17/24  1815   POCT GLUCOSE mg/dL 139* 143*  --  111* 193* 151*  --    < >  --    < >  --    < >  --    GLUCOSE mg/dL  --   --  147*  --   --   --  111*  --  662*  --  93  --  114*    < > = values in this interval not displayed.     Scheduled medications  acyclovir, 10 mg/kg (Ideal), intravenous, q8h  pantoprazole, 40 mg, oral, Daily before breakfast   Or  esomeprazole, 40 mg, nasoduodenal tube, Daily before breakfast   Or  pantoprazole, 40 mg, intravenous, Daily before breakfast  folic acid, 1 mg, oral, Daily  heparin (porcine), 5,000 Units, subcutaneous, q8h  hydrocortisone sodium succinate, 15 mg, intravenous, q6h  insulin lispro, 0-10 Units, subcutaneous, q4h  levETIRAcetam, 500 mg, intravenous, q12h  thiamine, 100 mg, nasogastric tube, Daily      Continuous medications     PRN medications  PRN medications: acetaminophen, LORazepam     Results for orders placed or performed during the hospital encounter of 01/17/24 (from the  past 24 hour(s))   POCT GLUCOSE   Result Value Ref Range    POCT Glucose 126 (H) 74 - 99 mg/dL   Blood Gas Venous Full Panel   Result Value Ref Range    POCT pH, Venous 7.45 (H) 7.33 - 7.43 pH    POCT pCO2, Venous 40 (L) 41 - 51 mm Hg    POCT pO2, Venous 48 (H) 35 - 45 mm Hg    POCT SO2, Venous 79 (H) 45 - 75 %    POCT Oxy Hemoglobin, Venous 78.4 (H) 45.0 - 75.0 %    POCT Hematocrit Calculated, Venous 28.0 (L) 36.0 - 46.0 %    POCT Sodium, Venous 146 (H) 136 - 145 mmol/L    POCT Potassium, Venous 3.4 (L) 3.5 - 5.3 mmol/L    POCT Chloride, Venous 114 (H) 98 - 107 mmol/L    POCT Ionized Calicum, Venous 1.13 1.10 - 1.33 mmol/L    POCT Glucose, Venous 142 (H) 74 - 99 mg/dL    POCT Lactate, Venous 1.0 0.4 - 2.0 mmol/L    POCT Base Excess, Venous 3.5 (H) -2.0 - 3.0 mmol/L    POCT HCO3 Calculated, Venous 27.8 (H) 22.0 - 26.0 mmol/L    POCT Hemoglobin, Venous 9.4 (L) 12.0 - 16.0 g/dL    POCT Anion Gap, Venous 8.0 (L) 10.0 - 25.0 mmol/L    Patient Temperature 37.0 degrees Celsius    FiO2 96 %   POCT GLUCOSE   Result Value Ref Range    POCT Glucose 122 (H) 74 - 99 mg/dL   Renal function panel   Result Value Ref Range    Glucose 111 (H) 74 - 99 mg/dL    Sodium 150 (H) 136 - 145 mmol/L    Potassium 3.2 (L) 3.5 - 5.3 mmol/L    Chloride 113 (H) 98 - 107 mmol/L    Bicarbonate 28 21 - 32 mmol/L    Anion Gap 12 10 - 20 mmol/L    Urea Nitrogen 16 6 - 23 mg/dL    Creatinine 1.27 (H) 0.50 - 1.05 mg/dL    eGFR 48 (L) >60 mL/min/1.73m*2    Calcium 7.6 (L) 8.6 - 10.6 mg/dL    Phosphorus 2.3 (L) 2.5 - 4.9 mg/dL    Albumin 2.5 (L) 3.4 - 5.0 g/dL   Magnesium   Result Value Ref Range    Magnesium 1.58 (L) 1.60 - 2.40 mg/dL   POCT GLUCOSE   Result Value Ref Range    POCT Glucose 151 (H) 74 - 99 mg/dL   POCT GLUCOSE   Result Value Ref Range    POCT Glucose 193 (H) 74 - 99 mg/dL   CBC and Auto Differential   Result Value Ref Range    WBC 3.6 (L) 4.4 - 11.3 x10*3/uL    nRBC 3.0 (H) 0.0 - 0.0 /100 WBCs    RBC 3.00 (L) 4.00 - 5.20 x10*6/uL     Hemoglobin 8.5 (L) 12.0 - 16.0 g/dL    Hematocrit 28.1 (L) 36.0 - 46.0 %    MCV 94 80 - 100 fL    MCH 28.3 26.0 - 34.0 pg    MCHC 30.2 (L) 32.0 - 36.0 g/dL    RDW 19.9 (H) 11.5 - 14.5 %    Platelets 71 (L) 150 - 450 x10*3/uL    Neutrophils %      Immature Granulocytes %, Automated 4.4 (H) 0.0 - 0.9 %    Lymphocytes %      Monocytes %      Eosinophils %      Basophils %      Neutrophils Absolute      Immature Granulocytes Absolute, Automated 0.16 0.00 - 0.70 x10*3/uL    Lymphocytes Absolute      Monocytes Absolute      Eosinophils Absolute      Basophils Absolute     Hepatic Function Panel   Result Value Ref Range    Albumin 2.3 (L) 3.4 - 5.0 g/dL    Bilirubin, Total 0.3 0.0 - 1.2 mg/dL    Bilirubin, Direct 0.1 0.0 - 0.3 mg/dL    Alkaline Phosphatase 87 33 - 136 U/L    ALT 24 7 - 45 U/L    AST 21 9 - 39 U/L    Total Protein 4.0 (L) 6.4 - 8.2 g/dL   Phosphorus   Result Value Ref Range    Phosphorus 2.7 2.5 - 4.9 mg/dL   POCT GLUCOSE   Result Value Ref Range    POCT Glucose 111 (H) 74 - 99 mg/dL   ECG 12 lead   Result Value Ref Range    Ventricular Rate 62 BPM    Atrial Rate 62 BPM    OH Interval 150 ms    QRS Duration 98 ms    QT Interval 408 ms    QTC Calculation(Bazett) 414 ms    P Axis 38 degrees    R Axis -17 degrees    T Axis -38 degrees    QRS Count 10 beats    Q Onset 217 ms    P Onset 142 ms    P Offset 195 ms    T Offset 421 ms    QTC Fredericia 412 ms   Renal function panel   Result Value Ref Range    Glucose 147 (H) 74 - 99 mg/dL    Sodium 146 (H) 136 - 145 mmol/L    Potassium 3.7 3.5 - 5.3 mmol/L    Chloride 109 (H) 98 - 107 mmol/L    Bicarbonate 29 21 - 32 mmol/L    Anion Gap 12 10 - 20 mmol/L    Urea Nitrogen 15 6 - 23 mg/dL    Creatinine 1.37 (H) 0.50 - 1.05 mg/dL    eGFR 44 (L) >60 mL/min/1.73m*2    Calcium 7.4 (L) 8.6 - 10.6 mg/dL    Phosphorus 3.5 2.5 - 4.9 mg/dL    Albumin 2.5 (L) 3.4 - 5.0 g/dL   Magnesium   Result Value Ref Range    Magnesium 1.81 1.60 - 2.40 mg/dL   POCT GLUCOSE   Result Value  Ref Range    POCT Glucose 143 (H) 74 - 99 mg/dL   POCT GLUCOSE   Result Value Ref Range    POCT Glucose 139 (H) 74 - 99 mg/dL                   Assessment/Plan   Principal Problem:    Meningitis     Bing Holliday is a 63 y.o. 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 difficile  (2/2023 ), history of norovirus (detected 11/23), recent diagnosis of secondary adrenal insufficiency? (With prednisone 10 mg +5 mg daily),  presented to [facility] on 1/14 with progressive weakness, confusion, diarrhea , back pain, initially admitted for management of secondary adrenal insufficiency, developed seizure-like activity, started on antiepileptic medications,Transferred on 1/17 to [facility] to rule out meningitis (LP), continuous video EEG, and rheumatology consult.     Endocrinology consulted for management of adrenal insufficiency and steroid dosing.     Differential diagnosis per team: Viral versus bacterial meningitis, versus CNS lupus.  (Patient has secondary adrenal insufficiency, less likely develop adrenal crisis as a possible explanation for the current presentation)  Transferred out of the ICU on 1/18 to regular nursing floor, so far being treated with vancomycin for C. difficile, and IV acyclovir for possible viral meningitis, receiving antiepileptic medications, has stable blood pressure, blood glucose, on tube feeds and free water flushes (hypernatremia improving)    Patient is currently not in septic shock.  -Would recommend decreasing hydrocortisone IV from 25 mg to 15 mg every 6 hours   -No need for any testing regarding the HPA axis, as patient has known AI secondary to  exogenous chronic glucocorticoid use.     Endocrinology pager [phone].  Plan communicated to primary team over the phone  Case discussed with Dr. Viera

## 2024-01-19 NOTE — CONSULTS
Vancomycin Dosing by Pharmacy- Cessation of Therapy    Consult to pharmacy for vancomycin dosing has been discontinued by the prescriber, pharmacy will sign off at this time.    Please call pharmacy if there are further questions or re-enter a consult if vancomycin is resumed.     Bacilio Prabhakar, PharmD

## 2024-01-19 NOTE — PROGRESS NOTES
Physical Therapy                 Therapy Communication Note    Patient Name: Bing Holliday  MRN: [id number]  Today's Date: 1/19/2024     Discipline: Physical Therapy    Missed Visit Reason: Missed Visit Reason:  (per bedside nurse, pt recently out of restraints, sleeping, would prefer to let pt rest at this time.)    Missed Time: Attempt      01/19/24 at 4:42 PM - Corine Haynes, PT

## 2024-01-19 NOTE — SIGNIFICANT EVENT
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  She also had a positive C. Diff PCR.  Her CSF was sent yesterday and it showed a WBC of 4 and with a normal protein and glucose.  Unfortunately, an HSV PCR was not sent.  WE called the micro lab and asked them to look to see if there is any leftover fluid on which to run the PCR.  If not, then primary team should call chemistry to see if any extra fluid can be sent for HSV PCR.    If this cannot be done, then need to consider repeat LP to send for HSV PCR.  Or to make a decision to stop acyclovir.  We do not think this represents an infection, given the normal WBC, protein and glucose.  But we cannot rule out % in the absence of PCR.  Not sure why cryptococcal antigen and fungal cultures were sent instead of the usual meningitis/encephalitis panel.    Continue oral vancomycin for 10 days.

## 2024-01-19 NOTE — CARE PLAN
Problem: Pain  Goal: My pain/discomfort is manageable  Outcome: Progressing   The patient's goals for the shift include      The clinical goals for the shift include Monitor pt for any S/S of aspiration during this shift

## 2024-01-20 LAB
ALBUMIN SERPL BCP-MCNC: 2.2 G/DL (ref 3.4–5)
ALBUMIN SERPL BCP-MCNC: 2.3 G/DL (ref 3.4–5)
ALP SERPL-CCNC: 94 U/L (ref 33–136)
ALT SERPL W P-5'-P-CCNC: 19 U/L (ref 7–45)
ANION GAP SERPL CALC-SCNC: 12 MMOL/L (ref 10–20)
ANION GAP SERPL CALC-SCNC: 13 MMOL/L (ref 10–20)
AST SERPL W P-5'-P-CCNC: 15 U/L (ref 9–39)
BASO STIPL BLD QL SMEAR: PRESENT
BASOPHILS # BLD MANUAL: 0 X10*3/UL (ref 0–0.1)
BASOPHILS NFR BLD MANUAL: 0 %
BILIRUB DIRECT SERPL-MCNC: 0.1 MG/DL (ref 0–0.3)
BILIRUB SERPL-MCNC: 0.3 MG/DL (ref 0–1.2)
BUN SERPL-MCNC: 18 MG/DL (ref 6–23)
BUN SERPL-MCNC: 20 MG/DL (ref 6–23)
CALCIUM SERPL-MCNC: 7.1 MG/DL (ref 8.6–10.6)
CALCIUM SERPL-MCNC: 7.2 MG/DL (ref 8.6–10.6)
CHLORIDE SERPL-SCNC: 109 MMOL/L (ref 98–107)
CHLORIDE SERPL-SCNC: 111 MMOL/L (ref 98–107)
CO2 SERPL-SCNC: 28 MMOL/L (ref 21–32)
CO2 SERPL-SCNC: 29 MMOL/L (ref 21–32)
CREAT SERPL-MCNC: 1.58 MG/DL (ref 0.5–1.05)
CREAT SERPL-MCNC: 1.6 MG/DL (ref 0.5–1.05)
EGFRCR SERPLBLD CKD-EPI 2021: 36 ML/MIN/1.73M*2
EGFRCR SERPLBLD CKD-EPI 2021: 37 ML/MIN/1.73M*2
EOSINOPHIL # BLD MANUAL: 0.03 X10*3/UL (ref 0–0.7)
EOSINOPHIL NFR BLD MANUAL: 0.9 %
ERYTHROCYTE [DISTWIDTH] IN BLOOD BY AUTOMATED COUNT: 19.8 % (ref 11.5–14.5)
GLUCOSE BLD MANUAL STRIP-MCNC: 110 MG/DL (ref 74–99)
GLUCOSE BLD MANUAL STRIP-MCNC: 137 MG/DL (ref 74–99)
GLUCOSE BLD MANUAL STRIP-MCNC: 165 MG/DL (ref 74–99)
GLUCOSE BLD MANUAL STRIP-MCNC: 168 MG/DL (ref 74–99)
GLUCOSE BLD MANUAL STRIP-MCNC: 177 MG/DL (ref 74–99)
GLUCOSE BLD MANUAL STRIP-MCNC: 225 MG/DL (ref 74–99)
GLUCOSE SERPL-MCNC: 144 MG/DL (ref 74–99)
GLUCOSE SERPL-MCNC: 236 MG/DL (ref 74–99)
HCT VFR BLD AUTO: 28 % (ref 36–46)
HGB BLD-MCNC: 8.5 G/DL (ref 12–16)
HU1 AB SER QL IB: NEGATIVE
HU1 AB SER QL IB: NEGATIVE
HU2 AB SER QL IB: NEGATIVE
HU2 AB SER QL IB: NEGATIVE
IMM GRANULOCYTES # BLD AUTO: 0.17 X10*3/UL (ref 0–0.7)
IMM GRANULOCYTES NFR BLD AUTO: 4.7 % (ref 0–0.9)
LYMPHOCYTES # BLD MANUAL: 0.23 X10*3/UL (ref 1.2–4.8)
LYMPHOCYTES NFR BLD MANUAL: 6.4 %
MAGNESIUM SERPL-MCNC: 2.06 MG/DL (ref 1.6–2.4)
MAGNESIUM SERPL-MCNC: 2.25 MG/DL (ref 1.6–2.4)
MCH RBC QN AUTO: 29.1 PG (ref 26–34)
MCHC RBC AUTO-ENTMCNC: 30.4 G/DL (ref 32–36)
MCV RBC AUTO: 96 FL (ref 80–100)
MONOCYTES # BLD MANUAL: 0.07 X10*3/UL (ref 0.1–1)
MONOCYTES NFR BLD MANUAL: 1.9 %
MYELOCYTES # BLD MANUAL: 0.06 X10*3/UL
MYELOCYTES NFR BLD MANUAL: 1.8 %
NEUTS SEG # BLD MANUAL: 3.2 X10*3/UL (ref 1.2–7)
NEUTS SEG NFR BLD MANUAL: 89 %
NRBC BLD-RTO: 5.2 /100 WBCS (ref 0–0)
PCA-TR AB SER QL IB: NEGATIVE
PCA-TR AB SER QL IB: NEGATIVE
PHOSPHATE SERPL-MCNC: 2.1 MG/DL (ref 2.5–4.9)
PHOSPHATE SERPL-MCNC: 3.5 MG/DL (ref 2.5–4.9)
PLATELET # BLD AUTO: 68 X10*3/UL (ref 150–450)
POTASSIUM SERPL-SCNC: 3.9 MMOL/L (ref 3.5–5.3)
POTASSIUM SERPL-SCNC: 4.2 MMOL/L (ref 3.5–5.3)
PROT SERPL-MCNC: 4 G/DL (ref 6.4–8.2)
PURKINJE CELLS AB SER QL IB: NEGATIVE
PURKINJE CELLS AB SER QL IB: NEGATIVE
RBC # BLD AUTO: 2.92 X10*6/UL (ref 4–5.2)
RBC MORPH BLD: ABNORMAL
SODIUM SERPL-SCNC: 146 MMOL/L (ref 136–145)
SODIUM SERPL-SCNC: 148 MMOL/L (ref 136–145)
TOTAL CELLS COUNTED BLD: 109
WBC # BLD AUTO: 3.6 X10*3/UL (ref 4.4–11.3)

## 2024-01-20 PROCEDURE — 85027 COMPLETE CBC AUTOMATED: CPT

## 2024-01-20 PROCEDURE — 2500000001 HC RX 250 WO HCPCS SELF ADMINISTERED DRUGS (ALT 637 FOR MEDICARE OP)

## 2024-01-20 PROCEDURE — 84100 ASSAY OF PHOSPHORUS: CPT

## 2024-01-20 PROCEDURE — 82040 ASSAY OF SERUM ALBUMIN: CPT

## 2024-01-20 PROCEDURE — 2500000005 HC RX 250 GENERAL PHARMACY W/O HCPCS

## 2024-01-20 PROCEDURE — 83735 ASSAY OF MAGNESIUM: CPT

## 2024-01-20 PROCEDURE — C9113 INJ PANTOPRAZOLE SODIUM, VIA: HCPCS

## 2024-01-20 PROCEDURE — 85007 BL SMEAR W/DIFF WBC COUNT: CPT

## 2024-01-20 PROCEDURE — 92610 EVALUATE SWALLOWING FUNCTION: CPT | Mod: GN

## 2024-01-20 PROCEDURE — 99231 SBSQ HOSP IP/OBS SF/LOW 25: CPT | Performed by: STUDENT IN AN ORGANIZED HEALTH CARE EDUCATION/TRAINING PROGRAM

## 2024-01-20 PROCEDURE — 2500000004 HC RX 250 GENERAL PHARMACY W/ HCPCS (ALT 636 FOR OP/ED)

## 2024-01-20 PROCEDURE — 99232 SBSQ HOSP IP/OBS MODERATE 35: CPT | Performed by: STUDENT IN AN ORGANIZED HEALTH CARE EDUCATION/TRAINING PROGRAM

## 2024-01-20 PROCEDURE — 80069 RENAL FUNCTION PANEL: CPT

## 2024-01-20 PROCEDURE — 82947 ASSAY GLUCOSE BLOOD QUANT: CPT

## 2024-01-20 PROCEDURE — 1200000002 HC GENERAL ROOM WITH TELEMETRY DAILY

## 2024-01-20 PROCEDURE — 97162 PT EVAL MOD COMPLEX 30 MIN: CPT | Mod: GP | Performed by: PHYSICAL MEDICINE & REHABILITATION

## 2024-01-20 PROCEDURE — 83735 ASSAY OF MAGNESIUM: CPT | Performed by: STUDENT IN AN ORGANIZED HEALTH CARE EDUCATION/TRAINING PROGRAM

## 2024-01-20 PROCEDURE — 80069 RENAL FUNCTION PANEL: CPT | Mod: CCI | Performed by: STUDENT IN AN ORGANIZED HEALTH CARE EDUCATION/TRAINING PROGRAM

## 2024-01-20 RX ORDER — LORAZEPAM 2 MG/ML
2 INJECTION INTRAMUSCULAR ONCE AS NEEDED
Status: DISCONTINUED | OUTPATIENT
Start: 2024-01-20 | End: 2024-01-28 | Stop reason: HOSPADM

## 2024-01-20 RX ADMIN — THIAMINE HCL TAB 100 MG 100 MG: 100 TAB at 09:01

## 2024-01-20 RX ADMIN — LEVETIRACETAM 500 MG: 5 INJECTION INTRAVENOUS at 11:53

## 2024-01-20 RX ADMIN — INSULIN LISPRO 2 UNITS: 100 INJECTION, SOLUTION INTRAVENOUS; SUBCUTANEOUS at 21:01

## 2024-01-20 RX ADMIN — FOLIC ACID 1 MG: 1 TABLET ORAL at 09:01

## 2024-01-20 RX ADMIN — ACYCLOVIR SODIUM 650 MG: 50 INJECTION, SOLUTION INTRAVENOUS at 05:37

## 2024-01-20 RX ADMIN — HEPARIN SODIUM 5000 UNITS: 5000 INJECTION INTRAVENOUS; SUBCUTANEOUS at 16:33

## 2024-01-20 RX ADMIN — HYDROCORTISONE SODIUM SUCCINATE 15 MG: 100 INJECTION, POWDER, FOR SOLUTION INTRAMUSCULAR; INTRAVENOUS at 12:46

## 2024-01-20 RX ADMIN — HEPARIN SODIUM 5000 UNITS: 5000 INJECTION INTRAVENOUS; SUBCUTANEOUS at 09:03

## 2024-01-20 RX ADMIN — HYDROCORTISONE SODIUM SUCCINATE 15 MG: 100 INJECTION, POWDER, FOR SOLUTION INTRAMUSCULAR; INTRAVENOUS at 01:04

## 2024-01-20 RX ADMIN — INSULIN LISPRO 2 UNITS: 100 INJECTION, SOLUTION INTRAVENOUS; SUBCUTANEOUS at 05:52

## 2024-01-20 RX ADMIN — HEPARIN SODIUM 5000 UNITS: 5000 INJECTION INTRAVENOUS; SUBCUTANEOUS at 01:05

## 2024-01-20 RX ADMIN — LEVETIRACETAM 500 MG: 5 INJECTION INTRAVENOUS at 23:06

## 2024-01-20 RX ADMIN — HYDROCORTISONE SODIUM SUCCINATE 15 MG: 100 INJECTION, POWDER, FOR SOLUTION INTRAMUSCULAR; INTRAVENOUS at 18:38

## 2024-01-20 RX ADMIN — POTASSIUM PHOSPHATE, MONOBASIC AND POTASSIUM PHOSPHATE, DIBASIC 21 MMOL: 224; 236 INJECTION, SOLUTION, CONCENTRATE INTRAVENOUS at 01:05

## 2024-01-20 RX ADMIN — PANTOPRAZOLE SODIUM 40 MG: 40 INJECTION, POWDER, FOR SOLUTION INTRAVENOUS at 05:38

## 2024-01-20 RX ADMIN — HYDROCORTISONE SODIUM SUCCINATE 15 MG: 100 INJECTION, POWDER, FOR SOLUTION INTRAMUSCULAR; INTRAVENOUS at 05:39

## 2024-01-20 RX ADMIN — INSULIN LISPRO 2 UNITS: 100 INJECTION, SOLUTION INTRAVENOUS; SUBCUTANEOUS at 12:45

## 2024-01-20 RX ADMIN — HYDROCORTISONE SODIUM SUCCINATE 15 MG: 100 INJECTION, POWDER, FOR SOLUTION INTRAMUSCULAR; INTRAVENOUS at 23:34

## 2024-01-20 RX ADMIN — ACETAMINOPHEN 650 MG: 325 TABLET ORAL at 09:02

## 2024-01-20 RX ADMIN — ACETAMINOPHEN 650 MG: 325 TABLET ORAL at 17:45

## 2024-01-20 ASSESSMENT — PAIN SCALES - GENERAL
PAINLEVEL_OUTOF10: 0 - NO PAIN
PAINLEVEL_OUTOF10: 3

## 2024-01-20 ASSESSMENT — COGNITIVE AND FUNCTIONAL STATUS - GENERAL
STANDING UP FROM CHAIR USING ARMS: TOTAL
MOVING TO AND FROM BED TO CHAIR: TOTAL
WALKING IN HOSPITAL ROOM: TOTAL
MOVING FROM LYING ON BACK TO SITTING ON SIDE OF FLAT BED WITH BEDRAILS: A LOT
TURNING FROM BACK TO SIDE WHILE IN FLAT BAD: A LOT
MOBILITY SCORE: 8
WALKING IN HOSPITAL ROOM: TOTAL
CLIMB 3 TO 5 STEPS WITH RAILING: TOTAL
DRESSING REGULAR LOWER BODY CLOTHING: TOTAL
CLIMB 3 TO 5 STEPS WITH RAILING: TOTAL
MOVING TO AND FROM BED TO CHAIR: TOTAL
TURNING FROM BACK TO SIDE WHILE IN FLAT BAD: TOTAL
DRESSING REGULAR UPPER BODY CLOTHING: TOTAL
EATING MEALS: TOTAL
TOILETING: TOTAL
STANDING UP FROM CHAIR USING ARMS: TOTAL
HELP NEEDED FOR BATHING: TOTAL

## 2024-01-20 ASSESSMENT — PAIN - FUNCTIONAL ASSESSMENT
PAIN_FUNCTIONAL_ASSESSMENT: 0-10

## 2024-01-20 ASSESSMENT — PAIN SCALES - WONG BAKER
WONGBAKER_NUMERICALRESPONSE: NO HURT
WONGBAKER_NUMERICALRESPONSE: NO HURT

## 2024-01-20 ASSESSMENT — PAIN DESCRIPTION - LOCATION: LOCATION: HAND

## 2024-01-20 ASSESSMENT — ACTIVITIES OF DAILY LIVING (ADL): ADL_ASSISTANCE: INDEPENDENT

## 2024-01-20 ASSESSMENT — PAIN DESCRIPTION - ORIENTATION: ORIENTATION: RIGHT;LEFT

## 2024-01-20 NOTE — SIGNIFICANT EVENT
Primary team was able to send held CSF for HSV PCR.  This was negative as was the rest of the meningitis panel.    We therefore recommend her acyclovir be stopped.  Source of seizures is not likely to be infectious.    ID Will be signing off.  Please page team A ([phone]) with any questions.

## 2024-01-20 NOTE — CARE PLAN
The patient's goals for the shift include to become restraint free     The clinical goals for the shift include to have patient restraint free before end of shift    Over the shift, the patient did make progress toward the following goals. Barriers to progression include none. Recommendations to address these barriers include assess patient ability to follow command and understanding of line and tubing that must not be pulled at.

## 2024-01-20 NOTE — PROGRESS NOTES
Speech-Language Pathology      Inpatient Speech-Language Pathology Clinical Swallow Evaluation    Patient Name: Bing Holliday  MRN: [id number]  Today's Date: 1/20/2024   Time Calculation  Start Time: 1130  Stop Time: 1145  Time Calculation (min): 15 min       Recommendations:  Solid Diet Recommendations : Regular (IDDSI Level 7)  Liquid Diet Recommendations: Thin (IDDSI Level 0)  Compensatory Swallowing Strategies: Full supervision with meals, Eat/feed slowly  Follow up treatments: Diet tolerance monitoring  Frequent oral care    Assessment:   Clinical bedside completed. Pt presenting with functional oropharyngeal swallow, without s/s aspiration. Given pt AMS would recommend supervision with meals, SLP to follow-up to assess tolerance of diet, otherwise no further SLP needs anticipated.     Plan:  SLP Plan: Skilled SLP  SLP Frequency: 1x per week  Duration: 2 weeks  SLP Discharge Recommendations: Home with no further SLP  Discussed POC: Patient, Nursing, Physician  Discussed Risks/Benefits: Yes  Patient/Caregiver Agreeable: Yes  SLP - OK to Discharge: Yes        Subjective     History and Physical:    Bing Holliday is a 63 y.o. 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 initially c/f adrenal crisis from sepsis, but now transferred for inpatient rheum consult iso c/f lupus cerebritis vs meningitis vs adrenal insufficiency as etiology for encephalopathy.     Meningitis has since been rule-out via viral panel.     Relevant SLP Hx:  None on file     Baseline Assessment:  Respiratory Status: Room air  Behavior/Cognition: Alert, Cooperative (oriented x2, good command following, pt indicated pain (RN aware) which seemed to be somewhat distracting)  Patient Positioning: Upright in Bed      Objective      Oral/Motor Assessment:  Oral Hygiene: WFL  Dentition: Adequate/Natural  Oral Motor: Impaired Function  Labial ROM: Within Functional Limits  Labial Symmetry: Within Functional Limits  Lingual ROM: Within Functional Limits  Lingual Symmetry:  (Deviation to Rt upon protrusion)  Vocal Quality: Exceptions to WFL  Vocal Quality Impairment: Weak (Milldy weak, suspect related to vocal effort)  Intelligibility: Intelligible      Clinical Observations:  Consistencies Trialed: Yes  Consistencies Trialed: Ice Chips, Thin (IDDSI Level 0) - Straw, Thin (IDDSI Level 0) - Spoon, Pureed/extremely thick (IDDSI Level 4), Soft & bite sized/chopped (IDDSI Level 6), Regular (IDDSI Level 7)  3oz Water Protocol Utilized: yes    Pt consumed trials of ice chips, tsps water, and straw sips of water without anterior spillage and no s/s aspiration. Proceed to 3oz water assessment via straw sips which pt successfully completed; noted throat clear but no coughing and no wet vocal quality. Trials then presented of puree, bite size solids, and regular solids. Pt consumed trials vie direct feed assist. Noted efficient mastication of solids. Pt consumed numerous consecutive sips of water between trials, with no overt difficulty and no s/s aspiration.  Little-no oral residues observed following completion of trials. No s/s aspiration observed across all trials. Pt denied perceived difficulty.        Inpatient Education:  Pt educated on result and recommendations. Pt indicated understanding.       Goals: Pt will tolerate least restrictive diet without s/s aspiration 100% of the time.

## 2024-01-20 NOTE — PROGRESS NOTES
Bing Holliday is a 63 y.o. female on day 3 of admission presenting with Meningitis.    Subjective   NAEON, electrolyte repletion prn, HSV PCR negative so acyclovir stopped and meningitis panel otherwise negative. Restraints discontinued and hydrocortisone dropped to 15mg from 25mg per endocrinology. Denies F/C/NVD, AxOx3.          Objective     Physical Exam  Constitutional:       General: She is not in acute distress.     Appearance: She is not ill-appearing or diaphoretic.   HENT:      Head: Normocephalic and atraumatic.      Nose: Nose normal.   Eyes:      Extraocular Movements: Extraocular movements intact.      Pupils: Pupils are equal, round, and reactive to light.   Cardiovascular:      Rate and Rhythm: Normal rate and regular rhythm.   Pulmonary:      Effort: Pulmonary effort is normal.      Breath sounds: Normal breath sounds.   Abdominal:      General: Abdomen is flat.      Palpations: Abdomen is soft.   Musculoskeletal:      Cervical back: Normal range of motion and neck supple.   Skin:     General: Skin is warm and dry.   Neurological:      Mental Status: She is alert.         Last Recorded Vitals  Blood pressure 141/88, pulse 82, temperature 37 °C (98.6 °F), resp. rate 14, weight 99.3 kg (218 lb 14.7 oz), SpO2 96 %.  Intake/Output last 3 Shifts:  I/O last 3 completed shifts:  In: 4027 (40.6 mL/kg) [I.V.:505 (5.1 mL/kg); NG/GT:1450; IV Piggyback:2072]  Out: 3500 (35.2 mL/kg) [Urine:3500 (1 mL/kg/hr)]  Dosing Weight: 99.3 kg     Relevant Results              This patient has a central line   Reason for the central line remaining today? Parenteral medication    This patient has a urinary catheter   Reason for the urinary catheter remaining today? critically ill patient who need accurate urinary output measurements       Assessment/Plan   Principal Problem:    Meningitis    Bnig Holliday is a 63 y.o. 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 initially c/f adrenal crisis from sepsis, but now transferred for inpatient rheum consult iso c/f lupus cerebritis vs meningitis vs adrenal insufficiency as etiology for encephalopathy. Meningitis panel as well as HSV PCR negative, and her mentation has continued to improve while on the floor.     Updates:  -stopped acyclovir per ID after HSV PCR negative  -touch base w/ neurology about keppra, need for repeat imaging  -continue to hold cellcept?     #Encephalopathy  :: ddx NPSLE vs encephalitis vs adrenal insufficiency  :: per daughter, worsening confusion over past year with several admissions since November for AMS (hypoglycemia, UTI, adrenal crisis s/t norovirus). Unclear etiology of current encephalopathy, but consider above plus hypernatremia  -1/15 MRI Brain w and w/o contrast demonstrated focal encephalomalacia in L occipital region unchanged from previous exam. Hemosiderin deposit in L occipital region consistent w/ remote infarction/contusion. Scattered foci w/ increased signal in subcortical and periventricular white matter consistent w/ demyelination (new finding).    -Per neuro periventricular/juxtacortical white matter changes on FLAIR imaging similar to imaging from 03/2023. These changes less suggestive of lupus cerebritis and more suggestive of chronic age related white matter changes (as minimal to no white matter changes were seen on MRI from 2005)  -vEEG w/o epileptiform activity   -TSH, B12, folate wnl  -anti-dsDNA Ab negative, anti-ribosomal Ab negative  -KATHI panel w/ positive anti-SM/RNP and anti-chromatin ab positive,  C3/C4 wnl  -LP completed 1/18              -CSF glucose normal, wbc 4, Crypto antigen negative  -cultures, protein, HSV PCR, anti-ribosomal P ab, anti-neuronal Ab, autoimmune encephalopathy panel,  oligoclonal bands, IgG  index, Cryptococcal Ab pending  -per rheum, normal rheumatologic work-up goes against active SLE (lupus cerebritis)  -per neuro, less likely lupus cerebritis, consider Gbs given areflexia dn recent GI illness  -per ID, subacute onset of sxs + normal glucose against bacterial meningitis, more consistent w/ encephalitis  -S/p keppra load, wileytan at OSH, keppra continued 500 BID per neuro  -stop empiric meningitis tx, continue IV acyclovir  - Ativan 2 mg IV for prolonged motor seizure lasting more than 3 minutes or a cluster of 3 or more motor seizures in an 8 hour period.     #SLE  - labs as above  - Holding MMF (1/16) in setting of presumed infection  - Continue on stress dose steroids as below  - Rheumatology is following. Further recommendations based on results of LP    #Hypernatremia  -Na 146 on 1/20  -likely due to NPO status  -stop IVFs, resume Tfs and FWFs 240ml q4h  -Daily RFP     #c diff, recurrent episode?  (tx'ed w/PO vanc pulse dose in 3/2023 with taper, metronidazole 2/2023)  - as per chart review, has chronic diarrhea with IBS-type picture?  - diarrhea with positive PCR positive, EIA negative 1/18  - PCR was positive 5/2022, 10/2022, 2/2023. 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  -endocrine following  -on chronic prednisone (10mg + 5mg)   -Low concern for adrenal crisis  -stress dose hydrocortisone 25mg q6hrs ([id number] on 1/14) in light of presumed infection     #Paroxysmal Atrial fibrillation on eliquis  #Intermittent sinus bradycardia  -Chadsvasc 4  -DOAC held 1/15 in anticipation of LP, started SQH for dvt ppx  -continue telemetry     #Chronic Pancytopenia  - likely s/t lupus  - Retic count 0.02 (LLN 0.018), TIBC low, % sat low, ferritin elevated 1/16  - Has required 2 units of packed red blood cells on 1/16 1/17  - Hemoglobin goal greater than 7, platelets greater  than 10, if bleeding platelets greater than 50.     #JED on CKD 3, resolved  - Baseline appears to be to be between 1.6 and 1.8, Cr trend 2.24 -> 1.28  - Improved with fluids     #CAD s/p CABG 2013  -statin     F: Replete PRN  E: Replete PRN  N: TFs via DHT  DVT Ppx: SQH  GI Ppx: Esomeprazole  Access/Lines: 2 PIV, midline 1/16/ [place] 1/16     Code status: DNR and No Intubation  Emergency contact: López Lang (son) [phone] (Home Phone)         Raymundo Moreno MD

## 2024-01-20 NOTE — PROGRESS NOTES
Physical Therapy    Physical Therapy Evaluation    Patient Name: Bing Holliday  MRN: [id number]  Today's Date: 1/20/2024   Time Calculation  Start Time: 1141  Stop Time: 1201  Time Calculation (min): 20 min    Assessment/Plan   PT Assessment  PT Assessment Results: Decreased strength, Decreased endurance, Impaired balance, Decreased mobility, Decreased coordination, Decreased cognition, Decreased safety awareness  Rehab Prognosis: Good  Strengths: Attitude of self, Support of Caregivers, Premorbid level of function  Barriers to Participation: Ability to acquire knowledge, Comorbidities  End of Session Communication: Bedside nurse  Assessment Comment: Patient presents with decline in functional mobility as compared to baseline. Patient could benefit from continued PT services to address mobility deficits and return to PLOF  End of Session Patient Position: Bed, 4 rail up, Alarm off, caregiver present  IP OR SWING BED PT PLAN  Inpatient or Swing Bed: Inpatient  PT Plan  Treatment/Interventions: Bed mobility, Transfer training, Gait training, Balance training, Neuromuscular re-education, Strengthening, Range of motion, Endurance training, Therapeutic exercise, Therapeutic activity, Positioning  PT Plan: Skilled PT  PT Frequency: 3 times per week  PT Discharge Recommendations: Moderate intensity level of continued care  PT Recommended Transfer Status: Total assist  PT - OK to Discharge: Yes (Eval completed and D/C recommendation made.)      Subjective   General Visit Information:  General  Reason for Referral: 63 y.o. 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 initially c/f adrenal crisis from sepsis, but now transferred for inpatient rheum consult iso c/f lupus cerebritis vs meningitis vs adrenal insufficiency as etiology for  encephalopathy.      Meningitis has since been rule-out via viral panel.  Prior to Session Communication: Bedside nurse  Patient Position Received: Bed, 4 rail up, Alarm on (seizure precautions)  Preferred Learning Style: verbal (tactile cues)  General Comment: Patient pleasant and motivated for mobility. Confused as to situation. RN aware  Home Living:  Home Living  Type of Home: House  Lives With: Adult children (Son- works during the day)  Home Adaptive Equipment: Walker rolling or standard  Home Layout: Two level, Stairs to alternate level with rails, 1/2 bath on main level, Able to live on main level with bedroom/bathroom  Alternate Level Stairs-Rails: Right  Alternate Level Stairs-Number of Steps: 13 (basement walk in shower)  Home Access: Stairs to enter with rails  Entrance Stairs-Rails: Right  Entrance Stairs-Number of Steps: 3  Bathroom Shower/Tub: Walk-in shower  Prior Level of Function:  Prior Function Per Pt/Caregiver Report  Level of [place]: Independent with ADLs and functional transfers, Needs assistance with homemaking  ADL Assistance: Independent  Ambulatory Assistance: Independent (RW for household distances)  Precautions:  Precautions  Medical Precautions: Fall precautions, Seizure precautions, Infection precautions (C-diff Contact +)         Objective   Pain:  Pain Assessment  Pain Assessment: 0-10  Pain Score: 0 - No pain  Cognition:  Cognition  Orientation Level: Disoriented to situation (needs cues for orientation to time)    General Assessments:     Activity Tolerance  Endurance: Tolerates 10 - 20 min exercise with multiple rests    Sensation  Light Touch: No apparent deficits       Coordination  Movements are Fluid and Coordinated: No  Finger to Nose: Impaired (bilaterally)  Rapid Alternating Movements: Impaired, Bradykinesia  Finger to Target: Impaired (bilaterally)         Static Sitting Balance  Static Sitting-Balance Support: Feet supported, Bilateral upper extremity  supported  Static Sitting-Level of Assistance: Moderate assistance  Dynamic Sitting Balance  Dynamic Sitting-Balance Support: Feet supported, Bilateral upper extremity supported  Dynamic Sitting-Balance: Lateral lean, Forward lean  Dynamic Sitting-Comments: mod A       Functional Assessments:  Bed Mobility  Bed Mobility: Yes  Bed Mobility 1  Bed Mobility 1: Supine to sitting  Level of Assistance 1: Moderate verbal cues, Maximum tactile cues, Moderate assistance  Bed Mobility Comments 1: Mod A at both BLEs and trunk  Bed Mobility 2  Bed Mobility  2: Sitting to supine  Level of Assistance 2: Moderate assistance  Bed Mobility Comments 2: Cues for side lying and max A at BLEs. Pateint hayden difficulty managing trunk 2/2 weakness in BUEs  Bed Mobility 3  Bed Mobility 3: Scooting  Level of Assistance 3: Dependent (x 2)  Bed Mobility Comments 3: Scooting to HOB from supine    Transfers  Transfer: Yes  Transfer 1  Transfer From 1: Sit to  Transfer to 1: Stand  Transfer Device 1: Walker  Transfer Level of Assistance 1: Dependent, Maximum tactile cues, Maximum verbal cues  Trials/Comments 1: Unable to clear buttocks  Extremity/Trunk Assessments:  RUE   RUE :  (R shoulder and elbow flexion 3-/5, R wrist extension 2+/5)  LUE   LUE:  (L shoulder and elbow flexion 3-/5, L wrist extension 2+/5)  RLE   RLE :  (RLE unable to perform SLRat beginning of session in supine, however; sitting EOB RLE grossly 3/5. Progressed to able to perform SLR without quad lag at end of session)  LLE   LLE :  (LLE unable to perform SLR at beginning of session in supine, however; sitting EOB LLE grossly 3/5. Progressed to able to perform SLR without quad lag at end of session)  Outcome Measures:  [facility] Basic Mobility  Turning from your back to your side while in a flat bed without using bedrails: A lot  Moving from lying on your back to sitting on the side of a flat bed without using bedrails: A lot  Moving to and from bed to chair (including a  wheelchair): Total  Standing up from a chair using your arms (e.g. wheelchair or bedside chair): Total  To walk in hospital room: Total  Climbing 3-5 steps with railing: Total  Basic Mobility - Total Score: 8    Encounter Problems       Encounter Problems (Active)       Balance       STG - Maintains static sitting balance without upper extremity support >/= 10 min  (Progressing)       Start:  01/20/24    Expected End:  02/03/24       INTERVENTIONS:  1. Practice sitting on the edge of a bed/mat with minimal support.  2. Educate patient about maintining total hip precautions while maintaining balance.  3. Educate patient about pressure relief.  4. Educate patient about use of assistive device.            Mobility       STG - Patient will ambulate >/= 25 ft with min A using RW (Progressing)       Start:  01/20/24    Expected End:  02/03/24               Transfers       STG - Transfer from bed to chair with min A using RW (Progressing)       Start:  01/20/24    Expected End:  02/03/24            STG - Patient will perform bed mobility with SBA (Progressing)       Start:  01/20/24    Expected End:  02/03/24            STG - Patient will transfer sit to and from stand with CGA and RW (Progressing)       Start:  01/20/24    Expected End:  02/03/24                   Education Documentation  Mobility Training, taught by Vernell Juarez, PT at 1/20/2024 12:53 PM.  Learner: Patient  Readiness: Acceptance  Method: Explanation, Demonstration  Response: Verbalizes Understanding, Demonstrated Understanding  Comment: Patient educated on role of PT services and benefits of mobility    Education Comments  No comments found.

## 2024-01-20 NOTE — PROGRESS NOTES
"NEUROLOGY CONSULT PROGRESS NOTE  Bing Holliday is a 63 y.o. 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 -dx per MRI findings remotely, on MMF &chronic steroids), CVA (1990s, L PCA territory), HTN, COPD, GERD, T2DM presenting with weakness, AMS, diarrhea.    She was noticed to have paroxysmal episodes of facial twitching, lip smacking, staring, and BL UE jerking movements c/f seizures. Was loaded with renally dosed Keppra and started on 500 BID. Transferred from [facility] to [facility] for multidisciplinary evaluation of meningitis/encephalitis and seizures iso of immunosuppression.     Subjective   Interval Events & Workup:  Pt seen at bedside. She is awake and alert, following commands, no complaints.    =========  Inpatient Medications  Scheduled Medications  pantoprazole, 40 mg, oral, Daily before breakfast   Or  esomeprazole, 40 mg, nasoduodenal tube, Daily before breakfast   Or  pantoprazole, 40 mg, intravenous, Daily before breakfast  folic acid, 1 mg, oral, Daily  heparin (porcine), 5,000 Units, subcutaneous, q8h  hydrocortisone sodium succinate, 15 mg, intravenous, q6h  insulin lispro, 0-10 Units, subcutaneous, q4h  levETIRAcetam, 500 mg, intravenous, q12h  thiamine, 100 mg, nasogastric tube, Daily     Continuous Medications    PRN Medications  PRN medications: acetaminophen, LORazepam       =========  Objective   Vital Signs  BP (!) 146/96 (Patient Position: Lying)   Pulse 89   Temp 36.6 °C (97.9 °F)   Resp 14   Ht 1.727 m (5' 7.99\")   Wt 99.3 kg (218 lb 14.7 oz)   LMP  (LMP Unknown)   SpO2 97%   BMI 33.29 kg/m²     Physical Exam  Oriented to full name, [facility], 62 yo, but not to mm/yy. Follows simple commands for strength evaluation.  BUE 4+ proximally and distally  BLE 2 proximally and 3 distally (chronic per patient)         Recent/Relevant Labs:  Results from last 72 hours   Lab Units 01/20/24  0555 01/19/24  0513 01/18/24  0338 "   WBC AUTO x10*3/uL 3.6* 3.6* 3.9*   HEMOGLOBIN g/dL 8.5* 8.5* 9.4*   HEMATOCRIT % 28.0* 28.1* 29.0*   PLATELETS AUTO x10*3/uL 68* 71* 78*        Results from last 72 hours   Lab Units 01/20/24  0555 01/19/24  1842 01/19/24  1105   SODIUM mmol/L 146* 146* 146*   POTASSIUM mmol/L 4.2 3.3* 3.7   CHLORIDE mmol/L 109* 109* 109*   CO2 mmol/L 28 30 29   BUN mg/dL 18 16 15   CREATININE mg/dL 1.60* 1.46* 1.37*   MAGNESIUM mg/dL 2.25 2.34 1.81   PHOSPHORUS mg/dL 3.5 2.5 3.5      Results from last 72 hours   Lab Units 01/20/24  0555 01/19/24  0513 01/18/24  0338   ALK PHOS U/L 94 87 93   AST U/L 15 21 24   ALT U/L 19 24 30   BILIRUBIN TOTAL mg/dL 0.3 0.3 0.3   BILIRUBIN DIRECT mg/dL 0.1 0.1 0.1             BNP   Date/Time Value Ref Range Status   03/20/2023 11:31 PM 98 0 - 99 pg/mL Final     Comment:     .  <100 pg/mL - Heart failure unlikely  100-299 pg/mL - Intermediate probability of acute heart  .               failure exacerbation. Correlate with clinical  .               context and patient history.    >=300 pg/mL - Heart Failure likely. Correlate with clinical  .               context and patient history.  BNP testing is performed using different testing   methodology at [facility] than at other   [facility] hospitals. Direct result comparisons should   only be made within the same method.           Recent/Relevant Imaging:  MR brain w and wo IV contrast    Result Date: 1/15/2024  Interpreted By:  Sanchez Sandoval, STUDY: MR BRAIN W AND WO IV CONTRAST;  1/15/2024 4:30 pm   INDICATION: Signs/Symptoms:encephalopathy, c/f neuro lupus vs cva vs other acute intracranial pathology.   COMPARISON: March 2023.   ACCESSION NUMBER(S): [id number]   ORDERING CLINICIAN: JAYLA SORIANO   TECHNIQUE: The brain was studied in the sagittal, axial and coronal planes utilizing FLAIR, T1 and T2 weighted images.     Following intravenous injection of gadolinium contrast, T1 weighted fat suppressed multiplanar images were also  performed.   FINDINGS: There is a normal-size ventricular system.  There is no evidence of intracranial mass or extra-axial collection.  The skull base, paranasal sinuses and orbital structures are unremarkable. Diffusion weighted images and associated ADC maps of the brain were unremarkable.  There is no evidence of diffusion restriction to suggest the presence of acute infarction.   There is focal encephalomalacia in the left occipital region unchanged from the previous exam. Gradient echo T2 weighted images demonstrate hemosiderin deposition in the left occipital region.. Findings are consistent with remote infarction or contusion.   There are scattered foci of increased signal in the subcortical and periventricular white matter best appreciated on FLAIR images. These likely represent foci of demyelination of uncertain cause and significance and may be physiologic at this patient's stated age.       *There are patchy and confluence regions of abnormal signal in the periventricular white matter bilaterally. These are nonspecific findings consistent with demyelination due to ischemic, degenerative or primary demyelinating processes. *Unchanged previous infarction left occipital pole *There is no evidence of mass, iacute infarction or hemorrhage. * There is no measurable change compared to the previous exam.   MACRO: none   Signed by: Sanchez Sandoval 1/15/2024 4:48 PM Dictation workstation:   [id number]   CT head wo IV contrast    Result Date: 1/14/2024  Interpreted By:  Eugene Larsen, STUDY: CT of the brain without contrast dated  1/14/2024.   INDICATION: Signs/Symptoms:Confusion   COMPARISON: CT of the brain dated 12/17/2020   ACCESSION NUMBER(S): [id number]   ORDERING CLINICIAN: KEVIN SHAH   TECHNIQUE: Axial non-contrast CT of the brain was performed. Sagittal and coronal 2D reformats were obtained.   FINDINGS: BRAIN PARENCHYMA:   No acute intracranial hemorrhage or infarction is evident.  There is a focus  left occipital encephalomalacia. Is similar to the prior exam. There is incidental note of basal ganglia calcifications.   VENTRICLES AND EXTRA-AXIAL SPACES:   No hydrocephalus or midline shift is evident.  There is mild focal ex vacuo dilation of the posterior horn of the left lateral ventricle related to the occipital encephalomalacia.   INTRACRANIAL VESSELS:   Calcified atheromatous disease is seen of the visualized carotid and vertebrobasilar arterial trees.   SINUSES AND MASTOIDS:   Mucosal thickening is seen in the maxillary sinuses and multiple ethmoid air cells with complete opacification of some posterior right ethmoid air cells. There is an air-fluid level in the right sphenoid sinus. Mucoperiosteal thickening is seen in the maxillary sinuses right greater than left compatible with sequelae of chronic sinus disease. Mastoid air cells are clear.   OSSEOUS STRUCTURES:   No osseous injury is evident.   SOFT TISSUES:   Visualized associated soft tissues are grossly unremarkable.       1. No acute intracranial hemorrhage or infarction is evident. If there remains concern for acute cerebral insult, MRI is recommended. 2. Mucosal thickening in the sinuses with air-fluid level in the right sphenoid sinus as seen with acute sinusitis.   Signed by: Eugene Larsen 1/14/2024 4:34 PM Dictation workstation:   [id number]       =========  Assessment/Plan   Bing Holliday is a 63 y.o. 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 -dx per MRI findings remotely, on MMF &chronic steroids), CKD, CVA (1990s, L PCA territory), HTN, COPD, GERD, T2DM, 3 recent hospital admissions (urosepsis, Afib RVR, hypoglycemia, hypernatremia, etc), went to [facility] for weakness, AMS, diarrhea. Found to have seizure like episodes, initially c/f lupus cerebritis?, transferred to [facility] multidisciplinary evaluation of meningitis/encephalitis and seizures iso of immunosuppression. Neurology is  consulted 1/17 for AMS and seizure like episodes.     Paroxysmal episodes: semiology of oroailimentary automatisms -> dialepsis -> BL UE clonic. She was loaded with keppra and  started on 500 BID.    Initial exam 1/17: Awake, alert, oriented to self only, thinks she is at home and does not know the month, season or year even given choice. Follows commands briskly to open, close eyes and participate in motor exam. Tracks well, EOMI. No apparent facial droop. Moving all limbs spontaneously.BUE at least 4/5, BLE 3/5.  No nuchal rigidity appreciated.     Work up:  -MRI brain w/wo contrast personally reviewed and notable for L occipitals encephalomalacia iso of old stroke and periventricular/juxtacortical white matter changes on FLAIR imaging similar to imaging from 03/2023. These FLAIR changes are less suggestive of lupus cerebritis and more suggestive of chronic age related white matter changes (as minimal to no white matter changes were seen on MRI from 2005).  -CSF studies: T4 W4R72. glu 83, prot 53, OCB, pending: encephlopathy panel, neuronal nuclear Ab, IgG index and OCBs  -ESR17, C4 54(H), C3 108 wnl, KATHI anti SM/RNP > 8 (H), anti-chromatin 2.5 (H), dsNA neg, ribosomal P Ab neg  -cvEEG (1/17-18) while on Keppra: mod to sev diffuse encephalopathy, no lateralizing sign, no sz, + L arm jerk w/o EEG correlation     Impression:  # AMS, improving  -likely multifactorial, hospital acquired delirium (3 hospital admissions within 2 months and wax/wane mentation per documentation) and possible seizure (at this time cannot prove that it was not a seizure episode and given her medical history, might be safer to remain on keppra while admitted)   -low concern for lupus cerebritis at this time     Recommendations:  - c/w Keppra 500 BID; please renally adjust  - no additional work up at this time  - Ativan 2 mg IV for prolonged motor seizure lasting more than 3 minutes or a cluster of 3 or more motor seizures in an 8 hour  period.  - If patient has a clinical seizure please alert the neurology team  - outpatient neurology follow up   - Seizure precautions:  -Please instruct patient to refrain from any activity which could result in injury to themselves or others in the event of altered awareness, such as a seizure and/or loss of consciousness. These include (but are not limited to) driving, climbing ladders, swimming, hot-tub bathing, cooking, using power tools, or operating heavy machinery. These restrictions should continue for at least 6 months and/or until instructed by a doctor to do otherwise at their next follow-up.   -Please inform patient that these restrictions would be documented in the medical record.     I examined & discussed the patient above. Pt discussed with the attending, Dr. Resendez. Recommendations are not finalized until note is signed by the attending.    Aminah Steinberg MD  PGY-4 Neurology  Gen Neuro Pager: [phone]

## 2024-01-20 NOTE — CARE PLAN
The patient's goals for the shift include      The clinical goals for the shift include pt will be free from falls/injury this shift/ Bed alarm remains on

## 2024-01-21 PROBLEM — R56.9 SEIZURE (MULTI): Status: ACTIVE | Noted: 2024-01-21

## 2024-01-21 PROBLEM — G93.40 ENCEPHALOPATHY: Status: ACTIVE | Noted: 2024-01-21

## 2024-01-21 LAB
ALBUMIN SERPL BCP-MCNC: 2.3 G/DL (ref 3.4–5)
ALP SERPL-CCNC: 93 U/L (ref 33–136)
ALT SERPL W P-5'-P-CCNC: 19 U/L (ref 7–45)
ANION GAP SERPL CALC-SCNC: 12 MMOL/L (ref 10–20)
AST SERPL W P-5'-P-CCNC: 14 U/L (ref 9–39)
BACTERIA BLD CULT: NORMAL
BACTERIA BLD CULT: NORMAL
BASO STIPL BLD QL SMEAR: PRESENT
BASOPHILS # BLD MANUAL: 0 X10*3/UL (ref 0–0.1)
BASOPHILS NFR BLD MANUAL: 0 %
BILIRUB DIRECT SERPL-MCNC: 0.1 MG/DL (ref 0–0.3)
BILIRUB SERPL-MCNC: 0.3 MG/DL (ref 0–1.2)
BUN SERPL-MCNC: 23 MG/DL (ref 6–23)
CALCIUM SERPL-MCNC: 7.4 MG/DL (ref 8.6–10.6)
CHLORIDE SERPL-SCNC: 109 MMOL/L (ref 98–107)
CO2 SERPL-SCNC: 29 MMOL/L (ref 21–32)
CREAT SERPL-MCNC: 1.59 MG/DL (ref 0.5–1.05)
EGFRCR SERPLBLD CKD-EPI 2021: 37 ML/MIN/1.73M*2
EOSINOPHIL # BLD MANUAL: 0 X10*3/UL (ref 0–0.7)
EOSINOPHIL NFR BLD MANUAL: 0 %
ERYTHROCYTE [DISTWIDTH] IN BLOOD BY AUTOMATED COUNT: 19.7 % (ref 11.5–14.5)
GLUCOSE BLD MANUAL STRIP-MCNC: 128 MG/DL (ref 74–99)
GLUCOSE BLD MANUAL STRIP-MCNC: 163 MG/DL (ref 74–99)
GLUCOSE BLD MANUAL STRIP-MCNC: 164 MG/DL (ref 74–99)
GLUCOSE BLD MANUAL STRIP-MCNC: 166 MG/DL (ref 74–99)
GLUCOSE BLD MANUAL STRIP-MCNC: 186 MG/DL (ref 74–99)
GLUCOSE BLD MANUAL STRIP-MCNC: 210 MG/DL (ref 74–99)
GLUCOSE SERPL-MCNC: 247 MG/DL (ref 74–99)
HCT VFR BLD AUTO: 26.7 % (ref 36–46)
HGB BLD-MCNC: 8.2 G/DL (ref 12–16)
IMM GRANULOCYTES # BLD AUTO: 0.26 X10*3/UL (ref 0–0.7)
IMM GRANULOCYTES NFR BLD AUTO: 7.1 % (ref 0–0.9)
LYMPHOCYTES # BLD MANUAL: 0.13 X10*3/UL (ref 1.2–4.8)
LYMPHOCYTES NFR BLD MANUAL: 3.6 %
MAGNESIUM SERPL-MCNC: 2.01 MG/DL (ref 1.6–2.4)
MCH RBC QN AUTO: 29.6 PG (ref 26–34)
MCHC RBC AUTO-ENTMCNC: 30.7 G/DL (ref 32–36)
MCV RBC AUTO: 96 FL (ref 80–100)
MONOCYTES # BLD MANUAL: 0.07 X10*3/UL (ref 0.1–1)
MONOCYTES NFR BLD MANUAL: 1.8 %
NEUTS SEG # BLD MANUAL: 3.5 X10*3/UL (ref 1.2–7)
NEUTS SEG NFR BLD MANUAL: 94.6 %
NRBC BLD-RTO: 3.8 /100 WBCS (ref 0–0)
PHOSPHATE SERPL-MCNC: 2 MG/DL (ref 2.5–4.9)
PLATELET # BLD AUTO: 61 X10*3/UL (ref 150–450)
POTASSIUM SERPL-SCNC: 4.3 MMOL/L (ref 3.5–5.3)
PROT SERPL-MCNC: 4 G/DL (ref 6.4–8.2)
RBC # BLD AUTO: 2.77 X10*6/UL (ref 4–5.2)
RBC MORPH BLD: ABNORMAL
SODIUM SERPL-SCNC: 146 MMOL/L (ref 136–145)
TOTAL CELLS COUNTED BLD: 111
WBC # BLD AUTO: 3.7 X10*3/UL (ref 4.4–11.3)

## 2024-01-21 PROCEDURE — 80048 BASIC METABOLIC PNL TOTAL CA: CPT

## 2024-01-21 PROCEDURE — 85027 COMPLETE CBC AUTOMATED: CPT

## 2024-01-21 PROCEDURE — 1200000002 HC GENERAL ROOM WITH TELEMETRY DAILY

## 2024-01-21 PROCEDURE — 83735 ASSAY OF MAGNESIUM: CPT

## 2024-01-21 PROCEDURE — 2500000004 HC RX 250 GENERAL PHARMACY W/ HCPCS (ALT 636 FOR OP/ED)

## 2024-01-21 PROCEDURE — 84100 ASSAY OF PHOSPHORUS: CPT

## 2024-01-21 PROCEDURE — 99232 SBSQ HOSP IP/OBS MODERATE 35: CPT | Performed by: INTERNAL MEDICINE

## 2024-01-21 PROCEDURE — 2500000001 HC RX 250 WO HCPCS SELF ADMINISTERED DRUGS (ALT 637 FOR MEDICARE OP)

## 2024-01-21 PROCEDURE — 82248 BILIRUBIN DIRECT: CPT

## 2024-01-21 PROCEDURE — 85007 BL SMEAR W/DIFF WBC COUNT: CPT

## 2024-01-21 PROCEDURE — 99232 SBSQ HOSP IP/OBS MODERATE 35: CPT | Performed by: STUDENT IN AN ORGANIZED HEALTH CARE EDUCATION/TRAINING PROGRAM

## 2024-01-21 PROCEDURE — 2500000005 HC RX 250 GENERAL PHARMACY W/O HCPCS

## 2024-01-21 PROCEDURE — 82947 ASSAY GLUCOSE BLOOD QUANT: CPT | Mod: MUE

## 2024-01-21 RX ORDER — DEXTROSE MONOHYDRATE 100 MG/ML
0.3 INJECTION, SOLUTION INTRAVENOUS ONCE AS NEEDED
Status: DISCONTINUED | OUTPATIENT
Start: 2024-01-21 | End: 2024-01-28 | Stop reason: HOSPADM

## 2024-01-21 RX ORDER — INSULIN LISPRO 100 [IU]/ML
0-10 INJECTION, SOLUTION INTRAVENOUS; SUBCUTANEOUS
Status: DISCONTINUED | OUTPATIENT
Start: 2024-01-22 | End: 2024-01-28 | Stop reason: HOSPADM

## 2024-01-21 RX ORDER — DEXTROSE 50 % IN WATER (D50W) INTRAVENOUS SYRINGE
25
Status: DISCONTINUED | OUTPATIENT
Start: 2024-01-21 | End: 2024-01-28 | Stop reason: HOSPADM

## 2024-01-21 RX ORDER — LOPERAMIDE HYDROCHLORIDE 2 MG/1
2 CAPSULE ORAL 4 TIMES DAILY PRN
Status: DISCONTINUED | OUTPATIENT
Start: 2024-01-21 | End: 2024-01-28 | Stop reason: HOSPADM

## 2024-01-21 RX ADMIN — HEPARIN SODIUM 5000 UNITS: 5000 INJECTION INTRAVENOUS; SUBCUTANEOUS at 08:06

## 2024-01-21 RX ADMIN — HEPARIN SODIUM 5000 UNITS: 5000 INJECTION INTRAVENOUS; SUBCUTANEOUS at 18:19

## 2024-01-21 RX ADMIN — LOPERAMIDE HYDROCHLORIDE 2 MG: 2 CAPSULE ORAL at 18:29

## 2024-01-21 RX ADMIN — HYDROCORTISONE SODIUM SUCCINATE 15 MG: 100 INJECTION, POWDER, FOR SOLUTION INTRAMUSCULAR; INTRAVENOUS at 23:36

## 2024-01-21 RX ADMIN — POTASSIUM PHOSPHATE, MONOBASIC POTASSIUM PHOSPHATE, DIBASIC 21 MMOL: 224; 236 INJECTION, SOLUTION, CONCENTRATE INTRAVENOUS at 12:04

## 2024-01-21 RX ADMIN — LEVETIRACETAM 500 MG: 5 INJECTION INTRAVENOUS at 23:36

## 2024-01-21 RX ADMIN — HEPARIN SODIUM 5000 UNITS: 5000 INJECTION INTRAVENOUS; SUBCUTANEOUS at 00:30

## 2024-01-21 RX ADMIN — HYDROCORTISONE SODIUM SUCCINATE 15 MG: 100 INJECTION, POWDER, FOR SOLUTION INTRAMUSCULAR; INTRAVENOUS at 05:50

## 2024-01-21 RX ADMIN — INSULIN LISPRO 2 UNITS: 100 INJECTION, SOLUTION INTRAVENOUS; SUBCUTANEOUS at 18:19

## 2024-01-21 RX ADMIN — ESOMEPRAZOLE MAGNESIUM 40 MG: 40 FOR SUSPENSION ORAL at 05:50

## 2024-01-21 RX ADMIN — INSULIN LISPRO 4 UNITS: 100 INJECTION, SOLUTION INTRAVENOUS; SUBCUTANEOUS at 12:04

## 2024-01-21 RX ADMIN — HYDROCORTISONE SODIUM SUCCINATE 15 MG: 100 INJECTION, POWDER, FOR SOLUTION INTRAMUSCULAR; INTRAVENOUS at 12:04

## 2024-01-21 RX ADMIN — INSULIN LISPRO 2 UNITS: 100 INJECTION, SOLUTION INTRAVENOUS; SUBCUTANEOUS at 04:47

## 2024-01-21 RX ADMIN — HYDROCORTISONE SODIUM SUCCINATE 15 MG: 100 INJECTION, POWDER, FOR SOLUTION INTRAMUSCULAR; INTRAVENOUS at 18:23

## 2024-01-21 RX ADMIN — INSULIN LISPRO 2 UNITS: 100 INJECTION, SOLUTION INTRAVENOUS; SUBCUTANEOUS at 08:06

## 2024-01-21 RX ADMIN — LEVETIRACETAM 500 MG: 5 INJECTION INTRAVENOUS at 12:04

## 2024-01-21 RX ADMIN — FOLIC ACID 1 MG: 1 TABLET ORAL at 08:06

## 2024-01-21 RX ADMIN — THIAMINE HCL TAB 100 MG 100 MG: 100 TAB at 08:06

## 2024-01-21 ASSESSMENT — COGNITIVE AND FUNCTIONAL STATUS - GENERAL
DRESSING REGULAR UPPER BODY CLOTHING: TOTAL
TOILETING: TOTAL
MOVING TO AND FROM BED TO CHAIR: A LOT
TOILETING: TOTAL
STANDING UP FROM CHAIR USING ARMS: TOTAL
CLIMB 3 TO 5 STEPS WITH RAILING: TOTAL
CLIMB 3 TO 5 STEPS WITH RAILING: TOTAL
WALKING IN HOSPITAL ROOM: TOTAL
HELP NEEDED FOR BATHING: TOTAL
MOBILITY SCORE: 6
WALKING IN HOSPITAL ROOM: TOTAL
PERSONAL GROOMING: TOTAL
MOVING TO AND FROM BED TO CHAIR: A LOT
CLIMB 3 TO 5 STEPS WITH RAILING: TOTAL
PERSONAL GROOMING: TOTAL
DAILY ACTIVITIY SCORE: 6
MOBILITY SCORE: 9
EATING MEALS: TOTAL
TURNING FROM BACK TO SIDE WHILE IN FLAT BAD: A LOT
STANDING UP FROM CHAIR USING ARMS: TOTAL
TURNING FROM BACK TO SIDE WHILE IN FLAT BAD: A LOT
DRESSING REGULAR UPPER BODY CLOTHING: TOTAL
DAILY ACTIVITIY SCORE: 6
MOVING FROM LYING ON BACK TO SITTING ON SIDE OF FLAT BED WITH BEDRAILS: TOTAL
EATING MEALS: TOTAL
DRESSING REGULAR LOWER BODY CLOTHING: TOTAL
WALKING IN HOSPITAL ROOM: TOTAL
HELP NEEDED FOR BATHING: TOTAL
MOVING FROM LYING ON BACK TO SITTING ON SIDE OF FLAT BED WITH BEDRAILS: A LOT
DRESSING REGULAR LOWER BODY CLOTHING: TOTAL
DAILY ACTIVITIY SCORE: 6
TURNING FROM BACK TO SIDE WHILE IN FLAT BAD: TOTAL
DRESSING REGULAR UPPER BODY CLOTHING: TOTAL
DRESSING REGULAR LOWER BODY CLOTHING: TOTAL
PERSONAL GROOMING: TOTAL
HELP NEEDED FOR BATHING: TOTAL
MOVING TO AND FROM BED TO CHAIR: TOTAL
MOBILITY SCORE: 9
EATING MEALS: TOTAL
TOILETING: TOTAL
MOVING FROM LYING ON BACK TO SITTING ON SIDE OF FLAT BED WITH BEDRAILS: A LOT
STANDING UP FROM CHAIR USING ARMS: TOTAL

## 2024-01-21 ASSESSMENT — PAIN SCALES - GENERAL
PAINLEVEL_OUTOF10: 0 - NO PAIN

## 2024-01-21 ASSESSMENT — PAIN SCALES - WONG BAKER: WONGBAKER_NUMERICALRESPONSE: NO HURT

## 2024-01-21 NOTE — CARE PLAN
Problem: Pain  Goal: My pain/discomfort is manageable  Outcome: Progressing   The patient's goals for the shift include      The clinical goals for the shift include Pt will be free from fall during this shift

## 2024-01-21 NOTE — PROGRESS NOTES
"NEUROLOGY CONSULT PROGRESS NOTE  Bing Holliday is a 63 y.o. 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 -dx per MRI findings remotely, on MMF &chronic steroids), CVA (1990s, L PCA territory), HTN, COPD, GERD, T2DM presenting with weakness, AMS, diarrhea.    She was noticed to have paroxysmal episodes of facial twitching, lip smacking, staring, and BL UE jerking movements c/f seizures. Was loaded with renally dosed Keppra and started on 500 BID. Transferred from [facility] to [facility] for multidisciplinary evaluation of meningitis/encephalitis and seizures iso of immunosuppression.     Subjective   Interval Events & Workup:  Pt seen at bedside. She is awake and alert, following commands, no complaints. She thinks she is overall doing better and wants to know what happened to her.    =========  Inpatient Medications  Scheduled Medications  pantoprazole, 40 mg, oral, Daily before breakfast   Or  esomeprazole, 40 mg, nasoduodenal tube, Daily before breakfast   Or  pantoprazole, 40 mg, intravenous, Daily before breakfast  folic acid, 1 mg, oral, Daily  heparin (porcine), 5,000 Units, subcutaneous, q8h  hydrocortisone sodium succinate, 15 mg, intravenous, q6h  insulin lispro, 0-10 Units, subcutaneous, q4h  levETIRAcetam, 500 mg, intravenous, q12h  potassium phosphate, 21 mmol, intravenous, Once  thiamine, 100 mg, nasogastric tube, Daily     Continuous Medications    PRN Medications  PRN medications: acetaminophen, LORazepam, LORazepam       =========  Objective   Vital Signs  /74 (Patient Position: Lying)   Pulse 78   Temp 36.6 °C (97.9 °F)   Resp 16   Ht 1.727 m (5' 7.99\")   Wt 99.3 kg (218 lb 14.7 oz)   LMP  (LMP Unknown)   SpO2 94%   BMI 33.29 kg/m²     Physical Exam  Oriented to full name, [facility], 64 yo, Jan 2025. Follows simple commands for strength evaluation but does not follow commands cross midline. Expression and compression appears " intact.   BUE 4+ proximally and distally  BLE 2 proximally and 3 distally (chronic per patient)         Recent/Relevant Labs:  Results from last 72 hours   Lab Units 01/21/24  0440 01/20/24  0555 01/19/24  0513   WBC AUTO x10*3/uL 3.7* 3.6* 3.6*   HEMOGLOBIN g/dL 8.2* 8.5* 8.5*   HEMATOCRIT % 26.7* 28.0* 28.1*   PLATELETS AUTO x10*3/uL 61* 68* 71*          Results from last 72 hours   Lab Units 01/21/24  0440 01/20/24  1836 01/20/24  0555   SODIUM mmol/L 146* 148* 146*   POTASSIUM mmol/L 4.3 3.9 4.2   CHLORIDE mmol/L 109* 111* 109*   CO2 mmol/L 29 29 28   BUN mg/dL 23 20 18   CREATININE mg/dL 1.59* 1.58* 1.60*   MAGNESIUM mg/dL 2.01 2.06 2.25   PHOSPHORUS mg/dL 2.0* 2.1* 3.5        Results from last 72 hours   Lab Units 01/21/24 0440 01/20/24  0555 01/19/24  0513   ALK PHOS U/L 93 94 87   AST U/L 14 15 21   ALT U/L 19 19 24   BILIRUBIN TOTAL mg/dL 0.3 0.3 0.3   BILIRUBIN DIRECT mg/dL 0.1 0.1 0.1               BNP   Date/Time Value Ref Range Status   03/20/2023 11:31 PM 98 0 - 99 pg/mL Final     Comment:     .  <100 pg/mL - Heart failure unlikely  100-299 pg/mL - Intermediate probability of acute heart  .               failure exacerbation. Correlate with clinical  .               context and patient history.    >=300 pg/mL - Heart Failure likely. Correlate with clinical  .               context and patient history.  BNP testing is performed using different testing   methodology at [facility] than at other   [facility] hospitals. Direct result comparisons should   only be made within the same method.           Recent/Relevant Imaging:  MR brain w and wo IV contrast    Result Date: 1/15/2024  Interpreted By:  Sanchez Sandoval, STUDY: MR BRAIN W AND WO IV CONTRAST;  1/15/2024 4:30 pm   INDICATION: Signs/Symptoms:encephalopathy, c/f neuro lupus vs cva vs other acute intracranial pathology.   COMPARISON: March 2023.   ACCESSION NUMBER(S): [id number]   ORDERING CLINICIAN: JAYLA SORIANO   TECHNIQUE: The brain  was studied in the sagittal, axial and coronal planes utilizing FLAIR, T1 and T2 weighted images.     Following intravenous injection of gadolinium contrast, T1 weighted fat suppressed multiplanar images were also performed.   FINDINGS: There is a normal-size ventricular system.  There is no evidence of intracranial mass or extra-axial collection.  The skull base, paranasal sinuses and orbital structures are unremarkable. Diffusion weighted images and associated ADC maps of the brain were unremarkable.  There is no evidence of diffusion restriction to suggest the presence of acute infarction.   There is focal encephalomalacia in the left occipital region unchanged from the previous exam. Gradient echo T2 weighted images demonstrate hemosiderin deposition in the left occipital region.. Findings are consistent with remote infarction or contusion.   There are scattered foci of increased signal in the subcortical and periventricular white matter best appreciated on FLAIR images. These likely represent foci of demyelination of uncertain cause and significance and may be physiologic at this patient's stated age.       *There are patchy and confluence regions of abnormal signal in the periventricular white matter bilaterally. These are nonspecific findings consistent with demyelination due to ischemic, degenerative or primary demyelinating processes. *Unchanged previous infarction left occipital pole *There is no evidence of mass, iacute infarction or hemorrhage. * There is no measurable change compared to the previous exam.   MACRO: none   Signed by: Sanchez Sandoval 1/15/2024 4:48 PM Dictation workstation:   [id number]   CT head wo IV contrast    Result Date: 1/14/2024  Interpreted By:  Eugene Larsen, STUDY: CT of the brain without contrast dated  1/14/2024.   INDICATION: Signs/Symptoms:Confusion   COMPARISON: CT of the brain dated 12/17/2020   ACCESSION NUMBER(S): [id number]   ORDERING CLINICIAN: KEVIN SHAH    TECHNIQUE: Axial non-contrast CT of the brain was performed. Sagittal and coronal 2D reformats were obtained.   FINDINGS: BRAIN PARENCHYMA:   No acute intracranial hemorrhage or infarction is evident.  There is a focus left occipital encephalomalacia. Is similar to the prior exam. There is incidental note of basal ganglia calcifications.   VENTRICLES AND EXTRA-AXIAL SPACES:   No hydrocephalus or midline shift is evident.  There is mild focal ex vacuo dilation of the posterior horn of the left lateral ventricle related to the occipital encephalomalacia.   INTRACRANIAL VESSELS:   Calcified atheromatous disease is seen of the visualized carotid and vertebrobasilar arterial trees.   SINUSES AND MASTOIDS:   Mucosal thickening is seen in the maxillary sinuses and multiple ethmoid air cells with complete opacification of some posterior right ethmoid air cells. There is an air-fluid level in the right sphenoid sinus. Mucoperiosteal thickening is seen in the maxillary sinuses right greater than left compatible with sequelae of chronic sinus disease. Mastoid air cells are clear.   OSSEOUS STRUCTURES:   No osseous injury is evident.   SOFT TISSUES:   Visualized associated soft tissues are grossly unremarkable.       1. No acute intracranial hemorrhage or infarction is evident. If there remains concern for acute cerebral insult, MRI is recommended. 2. Mucosal thickening in the sinuses with air-fluid level in the right sphenoid sinus as seen with acute sinusitis.   Signed by: Eugene Larsen 1/14/2024 4:34 PM Dictation workstation:   [id number]       =========  Assessment/Plan   Bing Holliday is a 63 y.o. 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 -dx per MRI findings remotely, on MMF &chronic steroids), CKD, CVA (1990s, L PCA territory), HTN, COPD, GERD, T2DM, 3 recent hospital admissions (urosepsis, Afib RVR, hypoglycemia, hypernatremia, etc), went to [facility] for  weakness, AMS, diarrhea. Found to have seizure like episodes, initially c/f lupus cerebritis?, transferred to [facility] multidisciplinary evaluation of meningitis/encephalitis and seizures iso of immunosuppression. Neurology is consulted 1/17 for AMS and seizure like episodes.     Paroxysmal episodes: semiology of oroailimentary automatisms -> dialepsis -> BL UE clonic. She was loaded with keppra and  started on 500 BID.    Initial exam 1/17: Awake, alert, oriented to self only, thinks she is at home and does not know the month, season or year even given choice. Follows commands briskly to open, close eyes and participate in motor exam. Tracks well, EOMI. No apparent facial droop. Moving all limbs spontaneously.BUE at least 4/5, BLE 3/5.  No nuchal rigidity appreciated.     Work up:  -MRI brain w/wo contrast personally reviewed and notable for L occipitals encephalomalacia iso of old stroke and periventricular/juxtacortical white matter changes on FLAIR imaging similar to imaging from 03/2023. These FLAIR changes are less suggestive of lupus cerebritis and more suggestive of chronic age related white matter changes (as minimal to no white matter changes were seen on MRI from 2005).  -CSF studies: T4 W4R72. glu 83, prot 53, OCB, pending: encephlopathy panel, neuronal nuclear Ab, IgG index and OCBs  -ESR17, C4 54(H), C3 108 wnl, KATHI anti SM/RNP > 8 (H), anti-chromatin 2.5 (H), dsNA neg, ribosomal P Ab neg  -cvEEG (1/17-18) while on Keppra: mod to sev diffuse encephalopathy, no lateralizing sign, no sz, + L arm jerk w/o EEG correlation     Impression:  # AMS, improving  -likely multifactorial, hospital acquired delirium (3 hospital admissions within 2 months and wax/wane mentation per documentation) and possible seizure (at this time cannot prove that it was not a seizure episode and given her medical history, might be safer to remain on keppra while admitted)   -low concern for lupus cerebritis at this time      Recommendations:  - c/w Keppra 500 BID; please renally adjust  - no additional work up at this time  - Ativan 2 mg IV for prolonged motor seizure lasting more than 3 minutes or a cluster of 3 or more motor seizures in an 8 hour period.  - If patient has a clinical seizure please alert the neurology team  - outpatient neurology follow up   - Seizure precautions:  Patient was instructed to refrain from any activity which could result in injury to themselves or others in the event of altered awareness, such as a seizure and/or loss of consciousness. These include (but are not limited to) driving, climbing ladders, swimming, hot-tub bathing, cooking, using power tools, or operating heavy machinery. These restrictions should continue for at least 6 months and/or until instructed by a doctor to do otherwise at their next follow-up. The patient was informed that these restrictions would be documented in the medical record.    Neurology will sign off at this time. Please call us for questions or concerns.    Pt discussed with the attending, Dr. Resendez. Recommendations are not finalized until note is signed by the attending.    Aminah Steinberg MD  PGY-4 Neurology  Gen Neuro Pager: [phone]

## 2024-01-21 NOTE — PROGRESS NOTES
"Bing Holliday is a 63 y.o. female on day 4 of admission presenting with Meningitis.    Subjective   NAEON, electrolyte repletion prn. Aox3. No concerns this Am. Denies Ha/vision changes, SOB, CP, abdominal pain, f/c.    Objective     Physical Exam  Constitutional:       General: She is not in acute distress.     Appearance: She is not ill-appearing or diaphoretic.   HENT:      Head: Normocephalic and atraumatic.      Nose: Nose normal.   Eyes:      Extraocular Movements: Extraocular movements intact.      Pupils: Pupils are equal, round, and reactive to light.   Cardiovascular:      Rate and Rhythm: Normal rate and regular rhythm.   Pulmonary:      Effort: Pulmonary effort is normal.      Breath sounds: Normal breath sounds.   Abdominal:      General: Abdomen is flat.      Palpations: Abdomen is soft.   Musculoskeletal:      Cervical back: Normal range of motion and neck supple.   Skin:     General: Skin is warm and dry.   Neurological:      Mental Status: She is alert.         Last Recorded Vitals  Blood pressure 146/74, pulse 78, temperature 36.6 °C (97.9 °F), resp. rate 16, height 1.727 m (5' 7.99\"), weight 99.3 kg (218 lb 14.7 oz), SpO2 94 %.  Intake/Output last 3 Shifts:  I/O last 3 completed shifts:  In: 3286 (33.1 mL/kg) [NG/GT:2390; IV Piggyback:896]  Out: 2050 (20.6 mL/kg) [Urine:2050 (0.6 mL/kg/hr)]  Dosing Weight: 99.3 kg     Relevant Results  Results for orders placed or performed during the hospital encounter of 01/17/24 (from the past 24 hour(s))   POCT GLUCOSE   Result Value Ref Range    POCT Glucose 110 (H) 74 - 99 mg/dL   Renal Function Panel   Result Value Ref Range    Glucose 144 (H) 74 - 99 mg/dL    Sodium 148 (H) 136 - 145 mmol/L    Potassium 3.9 3.5 - 5.3 mmol/L    Chloride 111 (H) 98 - 107 mmol/L    Bicarbonate 29 21 - 32 mmol/L    Anion Gap 12 10 - 20 mmol/L    Urea Nitrogen 20 6 - 23 mg/dL    Creatinine 1.58 (H) 0.50 - 1.05 mg/dL    eGFR 37 (L) >60 mL/min/1.73m*2    Calcium 7.2 (L) 8.6 - " 10.6 mg/dL    Phosphorus 2.1 (L) 2.5 - 4.9 mg/dL    Albumin 2.3 (L) 3.4 - 5.0 g/dL   Magnesium   Result Value Ref Range    Magnesium 2.06 1.60 - 2.40 mg/dL   POCT GLUCOSE   Result Value Ref Range    POCT Glucose 168 (H) 74 - 99 mg/dL   POCT GLUCOSE   Result Value Ref Range    POCT Glucose 128 (H) 74 - 99 mg/dL   CBC and Auto Differential   Result Value Ref Range    WBC 3.7 (L) 4.4 - 11.3 x10*3/uL    nRBC 3.8 (H) 0.0 - 0.0 /100 WBCs    RBC 2.77 (L) 4.00 - 5.20 x10*6/uL    Hemoglobin 8.2 (L) 12.0 - 16.0 g/dL    Hematocrit 26.7 (L) 36.0 - 46.0 %    MCV 96 80 - 100 fL    MCH 29.6 26.0 - 34.0 pg    MCHC 30.7 (L) 32.0 - 36.0 g/dL    RDW 19.7 (H) 11.5 - 14.5 %    Platelets 61 (L) 150 - 450 x10*3/uL    Immature Granulocytes %, Automated 7.1 (H) 0.0 - 0.9 %    Immature Granulocytes Absolute, Automated 0.26 0.00 - 0.70 x10*3/uL   Hepatic Function Panel   Result Value Ref Range    Albumin 2.3 (L) 3.4 - 5.0 g/dL    Bilirubin, Total 0.3 0.0 - 1.2 mg/dL    Bilirubin, Direct 0.1 0.0 - 0.3 mg/dL    Alkaline Phosphatase 93 33 - 136 U/L    ALT 19 7 - 45 U/L    AST 14 9 - 39 U/L    Total Protein 4.0 (L) 6.4 - 8.2 g/dL   Magnesium   Result Value Ref Range    Magnesium 2.01 1.60 - 2.40 mg/dL   Phosphorus   Result Value Ref Range    Phosphorus 2.0 (L) 2.5 - 4.9 mg/dL   Basic Metabolic Panel   Result Value Ref Range    Glucose 247 (H) 74 - 99 mg/dL    Sodium 146 (H) 136 - 145 mmol/L    Potassium 4.3 3.5 - 5.3 mmol/L    Chloride 109 (H) 98 - 107 mmol/L    Bicarbonate 29 21 - 32 mmol/L    Anion Gap 12 10 - 20 mmol/L    Urea Nitrogen 23 6 - 23 mg/dL    Creatinine 1.59 (H) 0.50 - 1.05 mg/dL    eGFR 37 (L) >60 mL/min/1.73m*2    Calcium 7.4 (L) 8.6 - 10.6 mg/dL   Manual Differential   Result Value Ref Range    Neutrophils %, Manual 94.6 40.0 - 80.0 %    Lymphocytes %, Manual 3.6 13.0 - 44.0 %    Monocytes %, Manual 1.8 2.0 - 10.0 %    Eosinophils %, Manual 0.0 0.0 - 6.0 %    Basophils %, Manual 0.0 0.0 - 2.0 %    Seg Neutrophils Absolute,  Manual 3.50 1.20 - 7.00 x10*3/uL    Lymphocytes Absolute, Manual 0.13 (L) 1.20 - 4.80 x10*3/uL    Monocytes Absolute, Manual 0.07 (L) 0.10 - 1.00 x10*3/uL    Eosinophils Absolute, Manual 0.00 0.00 - 0.70 x10*3/uL    Basophils Absolute, Manual 0.00 0.00 - 0.10 x10*3/uL    Total Cells Counted 111     RBC Morphology See Below     Basophilic Stippling Present    POCT GLUCOSE   Result Value Ref Range    POCT Glucose 163 (H) 74 - 99 mg/dL   POCT GLUCOSE   Result Value Ref Range    POCT Glucose 186 (H) 74 - 99 mg/dL   POCT GLUCOSE   Result Value Ref Range    POCT Glucose 210 (H) 74 - 99 mg/dL      Assessment/Plan   Principal Problem:    Meningitis    Bing Holliday is a 63 y.o. 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 initially c/f adrenal crisis from sepsis, but now transferred for inpatient rheum consult iso c/f lupus cerebritis vs meningitis vs adrenal insufficiency as etiology for encephalopathy. ID, endo, rheum and neuro consulted. LP completee 1/18. Imaging and labs work, inculding CSF studies unremarkable. Most likely cause of Ams at this point believed to be delirium s/t multiple admissions and seizures. Started on keppra by neuro. Mentation has continued to improve since admission, now pending dispo.     Updates:  -per neuro, AMS likely s/t delirium and seizures, continue keppra 500 BID  -Slp evaluated, okay for regular diet. Will touch base with nurse this afternoon to see how breakfast was tolerated. Pull DHT if ate 50% of meal and no concerns  -PT/Ot rec' SNF, will f/u with son regarding dispo  -f/u with endo about home going steroids     #Encephalopathy  :: per daughter, worsening confusion over past year with several admissions since November for AMS (hypoglycemia, UTI, adrenal crisis s/t  norovirus)  -1/15 MRI Brain w and w/o contrast demonstrated focal encephalomalacia in L occipital region unchanged from previous exam. Hemosiderin deposit in L occipital region consistent w/ remote infarction/contusion. Scattered foci w/ increased signal in subcortical and periventricular white matter consistent w/ demyelination (new finding).    -Per neuro periventricular/juxtacortical white matter changes on FLAIR imaging similar to imaging from 03/2023. These changes less suggestive of lupus cerebritis and more suggestive of chronic age related white matter changes (as minimal to no white matter changes were seen on MRI from 2005)  -vEEG w/o epileptiform activity   -TSH, B12, folate wnl  -anti-dsDNA Ab negative, anti-ribosomal Ab negative  -KATHI panel w/ positive anti-SM/RNP and anti-chromatin ab positive,  C3/C4 wnl  -LP completed 1/18  -CSF glucose, protein normal, wbc 4, Crypto antigen negative, HSV pcr negative, cultures, meningitis/encephalitis panels negative, anti-neuronal Ab negative  -anti-ribosomal P ab, oligoclonal bands, IgG index, Cryptococcal Ab pending  -per rheum, normal rheumatologic work-up goes against active SLE (lupus cerebritis)  -per neuro, less likely lupus cerebritis, likely delirium given recurrent admissions and seizures  -per ID, unlikely infectious, stop empiric meningitis tx and IV acyclovir  -S/p keppra load, avitan at OSH, keppra continued 500 BID per neuro  - Ativan 2 mg IV for prolonged motor seizure lasting more than 3 minutes or a cluster of 3 or more motor seizures in an 8 hour period.     #SLE  - labs as above  - Holding MMF (1/16) in setting of presumed infection  - Continue on stress dose steroids as below  - Rheumatology is following. Further recommendations based on results of LP    #Hypernatremia  -Na up to 152  -likely due to NPO status  -adjust FWFs to Na  -Daily RFP     #c diff, recurrent episode?  (tx'ed w/PO vanc pulse dose in 3/2023 with taper, metronidazole  2/2023)  - as per chart review, has chronic diarrhea with IBS-type picture?  - diarrhea with positive PCR positive, EIA negative 1/18  - PCR was positive 5/2022, 10/2022, 2/2023. 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  -endocrine following, low concern for adrenal crisis  -on chronic prednisone (10mg + 5mg)   -started stress dose hydrocortisone 25mg q6hrs ([id number] on 1/14) in light of presumed infection, decreased to 15mg q6h 1/20  -touch base about home going steroid plan     #Paroxysmal Atrial fibrillation on eliquis  #Intermittent sinus bradycardia  -Chadsvasc 4  -DOAC held 1/15 in anticipation of LP, started SQH for dvt ppx  -Resume DOAC prior to DC  -continue telemetry     #Chronic Pancytopenia  - likely s/t lupus  - Retic count 0.02 (LLN 0.018), TIBC low, % sat low, ferritin elevated 1/16  - Has required 2 units of packed red blood cells on 1/16 1/17  - Hemoglobin goal greater than 7, platelets greater than 10, if bleeding platelets greater than 50.     #JED on CKD 3, resolved  - Bl ~ 1.6 -1.8, Cr trend 2.24 -> 1.28  - Improved with fluids     #CAD s/p CABG 2013  -statin    Dispo: PT/OT rec' SNF, pending discussion with son     F: Replete PRN  E: Replete PRN  N: TFs via DHT  DVT Ppx: SQH  GI Ppx: Esomeprazole  Access/Lines: 2 PIV, midline 1/16/ [place] 1/16     Code status: DNR and No Intubation  NOK: López Lang (son) [phone] (Home Phone)         Jeniffer Vasquez MD  Internal Medicine, PGY1

## 2024-01-22 ENCOUNTER — APPOINTMENT (OUTPATIENT)
Dept: RADIOLOGY | Facility: HOSPITAL | Age: 64
DRG: 101 | End: 2024-01-22
Payer: MEDICARE

## 2024-01-22 LAB
ALB CSF/SERPL: 10.7 RATIO (ref 0–9)
ALBUMIN CSF-MCNC: 25 MG/DL (ref 0–35)
ALBUMIN SERPL BCP-MCNC: 2.2 G/DL (ref 3.4–5)
ALBUMIN SERPL-MCNC: 2337 MG/DL (ref 3500–5200)
ANION GAP SERPL CALC-SCNC: 12 MMOL/L (ref 10–20)
ATRIAL RATE: 51 BPM
BUN SERPL-MCNC: 22 MG/DL (ref 6–23)
CALCIUM SERPL-MCNC: 7.5 MG/DL (ref 8.6–10.6)
CHLORIDE SERPL-SCNC: 107 MMOL/L (ref 98–107)
CO2 SERPL-SCNC: 29 MMOL/L (ref 21–32)
CREAT SERPL-MCNC: 1.71 MG/DL (ref 0.5–1.05)
EGFRCR SERPLBLD CKD-EPI 2021: 34 ML/MIN/1.73M*2
ERYTHROCYTE [DISTWIDTH] IN BLOOD BY AUTOMATED COUNT: 19.5 % (ref 11.5–14.5)
GLUCOSE BLD MANUAL STRIP-MCNC: 179 MG/DL (ref 74–99)
GLUCOSE BLD MANUAL STRIP-MCNC: 183 MG/DL (ref 74–99)
GLUCOSE BLD MANUAL STRIP-MCNC: 188 MG/DL (ref 74–99)
GLUCOSE BLD MANUAL STRIP-MCNC: 224 MG/DL (ref 74–99)
GLUCOSE BLD MANUAL STRIP-MCNC: 304 MG/DL (ref 74–99)
GLUCOSE SERPL-MCNC: 224 MG/DL (ref 74–99)
HCT VFR BLD AUTO: 25.6 % (ref 36–46)
HGB BLD-MCNC: 7.8 G/DL (ref 12–16)
IGG CSF-MCNC: 3 MG/DL (ref 0–6)
IGG SERPL-MCNC: 504 MG/DL (ref 768–1632)
IGG SYNTH RATE SER+CSF CALC-MRATE: 1.3 MG/D
IGG/ALB CLEAR SER+CSF-RTO: 0.56 RATIO (ref 0.28–0.66)
IGG/ALB CSF: 0.12 RATIO (ref 0.09–0.25)
MAGNESIUM SERPL-MCNC: 1.72 MG/DL (ref 1.6–2.4)
MCH RBC QN AUTO: 29.7 PG (ref 26–34)
MCHC RBC AUTO-ENTMCNC: 30.5 G/DL (ref 32–36)
MCV RBC AUTO: 97 FL (ref 80–100)
NRBC BLD-RTO: 3.5 /100 WBCS (ref 0–0)
OLIGOCLONAL BANDS CSF ELPH-IMP: ABNORMAL
OLIGOCLONAL BANDS CSF ELPH-IMP: NEGATIVE
OLIGOCLONAL BANDS CSF IEF: 1 BANDS (ref 0–1)
P AXIS: 58 DEGREES
P OFFSET: 187 MS
P ONSET: 123 MS
PHOSPHATE SERPL-MCNC: 2.4 MG/DL (ref 2.5–4.9)
PLATELET # BLD AUTO: 61 X10*3/UL (ref 150–450)
POTASSIUM SERPL-SCNC: 4.1 MMOL/L (ref 3.5–5.3)
PR INTERVAL: 182 MS
Q ONSET: 214 MS
QRS COUNT: 8 BEATS
QRS DURATION: 106 MS
QT INTERVAL: 452 MS
QTC CALCULATION(BAZETT): 416 MS
QTC FREDERICIA: 428 MS
R AXIS: -12 DEGREES
RBC # BLD AUTO: 2.63 X10*6/UL (ref 4–5.2)
SODIUM SERPL-SCNC: 144 MMOL/L (ref 136–145)
T AXIS: -33 DEGREES
T OFFSET: 440 MS
VENTRICULAR RATE: 51 BPM
WBC # BLD AUTO: 4.3 X10*3/UL (ref 4.4–11.3)

## 2024-01-22 PROCEDURE — 82947 ASSAY GLUCOSE BLOOD QUANT: CPT

## 2024-01-22 PROCEDURE — 99232 SBSQ HOSP IP/OBS MODERATE 35: CPT | Performed by: STUDENT IN AN ORGANIZED HEALTH CARE EDUCATION/TRAINING PROGRAM

## 2024-01-22 PROCEDURE — 80069 RENAL FUNCTION PANEL: CPT

## 2024-01-22 PROCEDURE — 2500000004 HC RX 250 GENERAL PHARMACY W/ HCPCS (ALT 636 FOR OP/ED): Mod: MUE

## 2024-01-22 PROCEDURE — 83735 ASSAY OF MAGNESIUM: CPT | Performed by: STUDENT IN AN ORGANIZED HEALTH CARE EDUCATION/TRAINING PROGRAM

## 2024-01-22 PROCEDURE — 2500000004 HC RX 250 GENERAL PHARMACY W/ HCPCS (ALT 636 FOR OP/ED)

## 2024-01-22 PROCEDURE — 99223 1ST HOSP IP/OBS HIGH 75: CPT

## 2024-01-22 PROCEDURE — 85027 COMPLETE CBC AUTOMATED: CPT | Performed by: STUDENT IN AN ORGANIZED HEALTH CARE EDUCATION/TRAINING PROGRAM

## 2024-01-22 PROCEDURE — 93970 EXTREMITY STUDY: CPT

## 2024-01-22 PROCEDURE — 2500000001 HC RX 250 WO HCPCS SELF ADMINISTERED DRUGS (ALT 637 FOR MEDICARE OP)

## 2024-01-22 PROCEDURE — 92526 ORAL FUNCTION THERAPY: CPT | Mod: GN

## 2024-01-22 PROCEDURE — 1200000002 HC GENERAL ROOM WITH TELEMETRY DAILY

## 2024-01-22 RX ADMIN — HEPARIN SODIUM 5000 UNITS: 5000 INJECTION INTRAVENOUS; SUBCUTANEOUS at 09:01

## 2024-01-22 RX ADMIN — INSULIN LISPRO 2 UNITS: 100 INJECTION, SOLUTION INTRAVENOUS; SUBCUTANEOUS at 13:36

## 2024-01-22 RX ADMIN — THIAMINE HCL TAB 100 MG 100 MG: 100 TAB at 09:02

## 2024-01-22 RX ADMIN — ACETAMINOPHEN 650 MG: 325 TABLET ORAL at 21:56

## 2024-01-22 RX ADMIN — HYDROCORTISONE SODIUM SUCCINATE 15 MG: 100 INJECTION, POWDER, FOR SOLUTION INTRAMUSCULAR; INTRAVENOUS at 17:47

## 2024-01-22 RX ADMIN — HEPARIN SODIUM 5000 UNITS: 5000 INJECTION INTRAVENOUS; SUBCUTANEOUS at 17:47

## 2024-01-22 RX ADMIN — LEVETIRACETAM 500 MG: 5 INJECTION INTRAVENOUS at 13:35

## 2024-01-22 RX ADMIN — FOLIC ACID 1 MG: 1 TABLET ORAL at 09:02

## 2024-01-22 RX ADMIN — LEVETIRACETAM 500 MG: 5 INJECTION INTRAVENOUS at 22:51

## 2024-01-22 RX ADMIN — INSULIN LISPRO 2 UNITS: 100 INJECTION, SOLUTION INTRAVENOUS; SUBCUTANEOUS at 09:02

## 2024-01-22 RX ADMIN — ACETAMINOPHEN 650 MG: 325 TABLET ORAL at 13:35

## 2024-01-22 RX ADMIN — HYDROCORTISONE SODIUM SUCCINATE 15 MG: 100 INJECTION, POWDER, FOR SOLUTION INTRAMUSCULAR; INTRAVENOUS at 13:35

## 2024-01-22 RX ADMIN — INSULIN LISPRO 4 UNITS: 100 INJECTION, SOLUTION INTRAVENOUS; SUBCUTANEOUS at 17:47

## 2024-01-22 RX ADMIN — HEPARIN SODIUM 5000 UNITS: 5000 INJECTION INTRAVENOUS; SUBCUTANEOUS at 00:52

## 2024-01-22 RX ADMIN — PANTOPRAZOLE SODIUM 40 MG: 40 TABLET, DELAYED RELEASE ORAL at 09:02

## 2024-01-22 RX ADMIN — HYDROCORTISONE SODIUM SUCCINATE 15 MG: 100 INJECTION, POWDER, FOR SOLUTION INTRAMUSCULAR; INTRAVENOUS at 05:34

## 2024-01-22 ASSESSMENT — COGNITIVE AND FUNCTIONAL STATUS - GENERAL
MOVING FROM LYING ON BACK TO SITTING ON SIDE OF FLAT BED WITH BEDRAILS: A LOT
EATING MEALS: TOTAL
DRESSING REGULAR UPPER BODY CLOTHING: TOTAL
HELP NEEDED FOR BATHING: TOTAL
STANDING UP FROM CHAIR USING ARMS: TOTAL
MOVING FROM LYING ON BACK TO SITTING ON SIDE OF FLAT BED WITH BEDRAILS: TOTAL
STANDING UP FROM CHAIR USING ARMS: TOTAL
MOVING TO AND FROM BED TO CHAIR: A LOT
PERSONAL GROOMING: TOTAL
TOILETING: TOTAL
TURNING FROM BACK TO SIDE WHILE IN FLAT BAD: A LOT
WALKING IN HOSPITAL ROOM: TOTAL
MOVING TO AND FROM BED TO CHAIR: TOTAL
HELP NEEDED FOR BATHING: TOTAL
MOBILITY SCORE: 9
TOILETING: TOTAL
CLIMB 3 TO 5 STEPS WITH RAILING: TOTAL
MOBILITY SCORE: 6
DAILY ACTIVITIY SCORE: 6
PERSONAL GROOMING: TOTAL
DRESSING REGULAR UPPER BODY CLOTHING: TOTAL
DRESSING REGULAR LOWER BODY CLOTHING: TOTAL
DRESSING REGULAR LOWER BODY CLOTHING: TOTAL
TURNING FROM BACK TO SIDE WHILE IN FLAT BAD: TOTAL
CLIMB 3 TO 5 STEPS WITH RAILING: TOTAL
WALKING IN HOSPITAL ROOM: TOTAL
DAILY ACTIVITIY SCORE: 6
EATING MEALS: TOTAL

## 2024-01-22 ASSESSMENT — ACTIVITIES OF DAILY LIVING (ADL): LACK_OF_TRANSPORTATION: PATIENT UNABLE TO ANSWER

## 2024-01-22 ASSESSMENT — PAIN SCALES - GENERAL
PAINLEVEL_OUTOF10: 4
PAINLEVEL_OUTOF10: 0 - NO PAIN

## 2024-01-22 ASSESSMENT — PAIN SCALES - WONG BAKER: WONGBAKER_NUMERICALRESPONSE: NO HURT

## 2024-01-22 NOTE — CARE PLAN
The patient's goals for the shift include      The clinical goals for the shift include Pt will be able to tolerate 50% of each meal

## 2024-01-22 NOTE — PROGRESS NOTES
01/22/24 1605   Discharge Planning   Living Arrangements Children   Support Systems Children;Family members   Assistance Needed SNF   Type of Residence Private residence   Number of Stairs to Enter Residence 2   Number of Stairs Within Residence 0   Do you have animals or pets at home? Yes   Type of Animals or Pets dog   Home or Post Acute Services Post acute facilities (Rehab/SNF/etc)   Type of Post Acute Facility Services Skilled nursing   Patient expects to be discharged to: SNF   Does the patient need discharge transport arranged? Yes   RoundTrip coordination needed? Yes   Financial Resource Strain   How hard is it for you to pay for the very basics like food, housing, medical care, and heating? Pt Unable   Housing Stability   In the last 12 months, was there a time when you were not able to pay the mortgage or rent on time? Pt Unable   In the last 12 months, was there a time when you did not have a steady place to sleep or slept in a shelter (including now)? Pt Unable   Transportation Needs   In the past 12 months, has lack of transportation kept you from medical appointments or from getting medications? Pt Unable   In the past 12 months, has lack of transportation kept you from meetings, work, or from getting things needed for daily living? Pt Unable   Patient Choice   Provider Choice list and CMS website (https://medicare.gov/care-compare#search) for post-acute Quality and Resource Measure Data were provided and reviewed with: Family     Called and spoke with patients son López Lang ([phone]) to complete discharge planning assessment. Prior to admission, patient was residing with her son in [place] and has lived with him for the past 2 years. Discussed PT recommendation for SNF at discharge and patients son is in agreement. States that [facility] and is agreeable for SNF list to be sent to his cell phone for additional choices.     PCP: Dr. Hood with  (last visit in  December)  Recent Falls: Denied  Assistive Devices: Rollator, Walker, Wheelchair, Cane  DME: Raised Toilet Seat, Grab Bar by toilet   Transportation: Patients son takes her to appointments  Pharmacy: [facility] [address] [facility] will continue to follow for discharge planning assistance.     -Gogo BENSON MA, LSW  [phone] or Located within [facility]  Care Transitions

## 2024-01-22 NOTE — CARE PLAN
The patient's goals for the shift include decrease in pain    The clinical goals for the shift include Decrease in pain    Over the shift, the patient did not make progress toward the following goals. Barriers to progression include   . Recommendations to address these barriers include   .

## 2024-01-22 NOTE — PROGRESS NOTES
Speech-Language Pathology    Inpatient SLP Swallow Treatment     Patient Name: Bing Holliday  MRN: [id number]  Today's Date: 1/22/2024  Time Calculation  Start Time: 1050  Stop Time: 1105  Time Calculation (min): 15 min         Recommendations:   Solid Diet Recommendations : Regular (IDDSI Level 7)  Liquid Diet Recommendations: Thin (IDDSI Level 0)  Compensatory Swallowing Strategies: Full supervision with meals, Eat/feed slowly  Follow up treatments: Diet tolerance monitoring  Frequent oral care       SLP Assessment:   Therapy completed targeting tolerance of diet. Pt presenting with improved overall presentation this date. No overt difficulty observed this date during session. Pt and nursing aid report she has been doing well with PO, no overt difficulty with meals. Pt continues to benefit from feed assist given pain in hands, however is capable of feeding self if need be. Recommend continue current diet with feed assist. No further SLP services warranted at this time; SLP to sign-off. Please re-consult if new concerns arise/pt undergoes status change.        Plan:  SLP TX Plan: Discharge from Speech Therapy  SLP Frequency: 1x per week  Duration: 2 weeks  SLP Discharge Recommendations: Home with no further SLP  Discussed POC: Patient, Nursing, Physician  Discussed Risks/Benefits: Yes  Patient/Caregiver Agreeable: Yes  SLP - OK to Discharge: Yes    Therapeutic Swallow Therapy:  Pt received upright and alert. More interactive this date with improved affect. Breathing on room air. Pt reported that she has had no perceived difficulty with PO and states she is feeling a lot better compared to over the weekend. Nursing aid reported that pt consumed eggs, cream of wheat., and three orange juices for breakfast with no overt difficulty.    This date pt consumed 4oz orange juice and a moe cracker. Pt fed self however endorsed significant discomfort/pain in hands when feeding self. No overt difficulty or s/s aspiration  noted across all trials. Pt denied difficulty.       Inpatient Education:  Pt educated on result and recommendations. Pt indicated understanding.       Goals: Pt will tolerate least restrictive diet without s/s aspiration 100% of the time. MET

## 2024-01-22 NOTE — PROGRESS NOTES
"Bing Holliday is a 63 y.o. female on day 5 of admission presenting with Meningitis.    Subjective   NAEON, electrolyte repletion prn. Aox3.    Is concerned w/ R hand swelling, non tender, non erythematous. Swelling dissipates at forearm, distal to IV site.    Objective     Physical Exam  Constitutional:       General: She is not in acute distress.     Appearance: She is not ill-appearing or diaphoretic.   HENT:      Head: Normocephalic and atraumatic.      Nose: Nose normal.   Eyes:      Extraocular Movements: Extraocular movements intact.      Pupils: Pupils are equal, round, and reactive to light.   Cardiovascular:      Rate and Rhythm: Normal rate and regular rhythm.   Pulmonary:      Effort: Pulmonary effort is normal.      Breath sounds: Normal breath sounds.   Abdominal:      General: Abdomen is flat.      Palpations: Abdomen is soft.   Musculoskeletal:      Cervical back: Normal range of motion and neck supple.   Skin:     General: Skin is warm and dry.   Neurological:      Mental Status: She is alert.         Last Recorded Vitals  Blood pressure 142/81, pulse 82, temperature 36.6 °C (97.9 °F), resp. rate 14, height 1.727 m (5' 7.99\"), weight 99.3 kg (218 lb 14.7 oz), SpO2 96 %.  Intake/Output last 3 Shifts:  I/O last 3 completed shifts:  In: 990 (10 mL/kg) [P.O.:240; NG/GT:500; IV Piggyback:250]  Out: 2600 (26.2 mL/kg) [Urine:2600 (0.7 mL/kg/hr)]  Dosing Weight: 99.3 kg     Relevant Results  Results for orders placed or performed during the hospital encounter of 01/17/24 (from the past 24 hour(s))   POCT GLUCOSE   Result Value Ref Range    POCT Glucose 164 (H) 74 - 99 mg/dL   POCT GLUCOSE   Result Value Ref Range    POCT Glucose 183 (H) 74 - 99 mg/dL   CBC   Result Value Ref Range    WBC 4.3 (L) 4.4 - 11.3 x10*3/uL    nRBC 3.5 (H) 0.0 - 0.0 /100 WBCs    RBC 2.63 (L) 4.00 - 5.20 x10*6/uL    Hemoglobin 7.8 (L) 12.0 - 16.0 g/dL    Hematocrit 25.6 (L) 36.0 - 46.0 %    MCV 97 80 - 100 fL    MCH 29.7 26.0 - " 34.0 pg    MCHC 30.5 (L) 32.0 - 36.0 g/dL    RDW 19.5 (H) 11.5 - 14.5 %    Platelets 61 (L) 150 - 450 x10*3/uL   Magnesium   Result Value Ref Range    Magnesium 1.72 1.60 - 2.40 mg/dL   Renal function panel   Result Value Ref Range    Glucose 224 (H) 74 - 99 mg/dL    Sodium 144 136 - 145 mmol/L    Potassium 4.1 3.5 - 5.3 mmol/L    Chloride 107 98 - 107 mmol/L    Bicarbonate 29 21 - 32 mmol/L    Anion Gap 12 10 - 20 mmol/L    Urea Nitrogen 22 6 - 23 mg/dL    Creatinine 1.71 (H) 0.50 - 1.05 mg/dL    eGFR 34 (L) >60 mL/min/1.73m*2    Calcium 7.5 (L) 8.6 - 10.6 mg/dL    Phosphorus 2.4 (L) 2.5 - 4.9 mg/dL    Albumin 2.2 (L) 3.4 - 5.0 g/dL   POCT GLUCOSE   Result Value Ref Range    POCT Glucose 179 (H) 74 - 99 mg/dL   POCT GLUCOSE   Result Value Ref Range    POCT Glucose 188 (H) 74 - 99 mg/dL      Assessment/Plan   Principal Problem:    Meningitis  Active Problems:    Encephalopathy    Seizure (CMS/HCC)    Bing Holliday is a 63 y.o. 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 initially c/f adrenal crisis from sepsis, but now transferred for inpatient rheum consult iso c/f lupus cerebritis vs meningitis vs adrenal insufficiency as etiology for encephalopathy. ID, endo, rheum and neuro consulted. LP completee 1/18. Imaging and labs work, inculding CSF studies unremarkable. Most likely cause of Ams at this point believed to be delirium s/t multiple admissions and seizures. Started on keppra by neuro. Mentation has continued to improve since admission, now pending dispo.     Updates 1/22/24:  -per neuro, AMS likely s/t delirium and seizures, continue keppra 500 BID  -Slp evaluated, okay for regular diet. DHT tube pulled as patient tolerated PO well  -PT/Ot rec' SNF, will f/u with son regarding dispo  -f/u with  endo about home going steroids, pending recs.     #Encephalopathy  :: per daughter, worsening confusion over past year with several admissions since November for AMS (hypoglycemia, UTI, adrenal crisis s/t norovirus)  -1/15 MRI Brain w and w/o contrast demonstrated focal encephalomalacia in L occipital region unchanged from previous exam. Hemosiderin deposit in L occipital region consistent w/ remote infarction/contusion. Scattered foci w/ increased signal in subcortical and periventricular white matter consistent w/ demyelination (new finding).    -Per neuro periventricular/juxtacortical white matter changes on FLAIR imaging similar to imaging from 03/2023. These changes less suggestive of lupus cerebritis and more suggestive of chronic age related white matter changes (as minimal to no white matter changes were seen on MRI from 2005)  -vEEG w/o epileptiform activity   -TSH, B12, folate wnl  -anti-dsDNA Ab negative, anti-ribosomal Ab negative  -KATHI panel w/ positive anti-SM/RNP and anti-chromatin ab positive,  C3/C4 wnl  -LP completed 1/18  -CSF glucose, protein normal, wbc 4, Crypto antigen negative, HSV pcr negative, cultures, meningitis/encephalitis panels negative, anti-neuronal Ab negative  -anti-ribosomal P ab, oligoclonal bands, IgG index, Cryptococcal Ab pending  -per rheum, normal rheumatologic work-up goes against active SLE (lupus cerebritis)  -per neuro, less likely lupus cerebritis, likely delirium given recurrent admissions and seizures  -per ID, unlikely infectious, stop empiric meningitis tx and IV acyclovir  -S/p keppra load, avitan at OSH, keppra continued 500 BID per neuro  - Ativan 2 mg IV for prolonged motor seizure lasting more than 3 minutes or a cluster of 3 or more motor seizures in an 8 hour period.     #SLE  - labs as above  - Holding MMF (1/16) in setting of presumed infection  - Continue on stress dose steroids as below  - Rheumatology is following. Further recommendations based on  results of LP    #Hypernatremia  -Na up to 152  -likely due to NPO status  -adjust FWFs to Na  -Daily RFP     #c diff, recurrent episode?  (tx'ed w/PO vanc pulse dose in 3/2023 with taper, metronidazole 2/2023)  - as per chart review, has chronic diarrhea with IBS-type picture?  - diarrhea with positive PCR positive, EIA negative 1/18  - PCR was positive 5/2022, 10/2022, 2/2023. 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  -endocrine following, low concern for adrenal crisis  -on chronic prednisone (10mg + 5mg)   -started stress dose hydrocortisone 25mg q6hrs ([id number] on 1/14) in light of presumed infection, decreased to 15mg q6h 1/20  -touch base about home going steroid plan     #Paroxysmal Atrial fibrillation on eliquis  #Intermittent sinus bradycardia  -Chadsvasc 4  -DOAC held 1/15 in anticipation of LP, started SQH for dvt ppx  -Resume DOAC prior to DC  -continue telemetry     #Chronic Pancytopenia  - likely s/t lupus  - Retic count 0.02 (LLN 0.018), TIBC low, % sat low, ferritin elevated 1/16  - Has required 2 units of packed red blood cells on 1/16 1/17  - Hemoglobin goal greater than 7, platelets greater than 10, if bleeding platelets greater than 50.     #JED on CKD 3, resolved  - Bl ~ 1.6 -1.8, Cr trend 2.24 -> 1.28  - Improved with fluids     #CAD s/p CABG 2013  -statin    Dispo: PT/OT rec' SNF, pending discussion with son     F: Replete PRN  E: Replete PRN  N: TFs via DHT  DVT Ppx: SQH  GI Ppx: Esomeprazole  Access/Lines: 2 PIV, midline 1/16/ [place]ano 1/16     Code status: DNR and No Intubation  NOK: López Lang (son) [phone] (Home Phone)         Kirk Cervantes MD PhD  Internal Medicine, PGY1

## 2024-01-23 ENCOUNTER — APPOINTMENT (OUTPATIENT)
Dept: RADIOLOGY | Facility: HOSPITAL | Age: 64
DRG: 101 | End: 2024-01-23
Payer: MEDICARE

## 2024-01-23 LAB
ALBUMIN SERPL BCP-MCNC: 2.2 G/DL (ref 3.4–5)
ANION GAP SERPL CALC-SCNC: 13 MMOL/L (ref 10–20)
BASOPHILS # BLD AUTO: 0 X10*3/UL (ref 0–0.1)
BASOPHILS NFR BLD AUTO: 0 %
BUN SERPL-MCNC: 23 MG/DL (ref 6–23)
BURR CELLS BLD QL SMEAR: NORMAL
CALCIUM SERPL-MCNC: 7.7 MG/DL (ref 8.6–10.6)
CHLORIDE SERPL-SCNC: 108 MMOL/L (ref 98–107)
CO2 SERPL-SCNC: 27 MMOL/L (ref 21–32)
CREAT SERPL-MCNC: 1.93 MG/DL (ref 0.5–1.05)
EGFRCR SERPLBLD CKD-EPI 2021: 29 ML/MIN/1.73M*2
EOSINOPHIL # BLD AUTO: 0 X10*3/UL (ref 0–0.7)
EOSINOPHIL NFR BLD AUTO: 0 %
ERYTHROCYTE [DISTWIDTH] IN BLOOD BY AUTOMATED COUNT: 19.6 % (ref 11.5–14.5)
GLUCOSE BLD MANUAL STRIP-MCNC: 225 MG/DL (ref 74–99)
GLUCOSE BLD MANUAL STRIP-MCNC: 258 MG/DL (ref 74–99)
GLUCOSE BLD MANUAL STRIP-MCNC: 282 MG/DL (ref 74–99)
GLUCOSE BLD MANUAL STRIP-MCNC: 423 MG/DL (ref 74–99)
GLUCOSE SERPL-MCNC: 334 MG/DL (ref 74–99)
HCT VFR BLD AUTO: 27.1 % (ref 36–46)
HGB BLD-MCNC: 8.3 G/DL (ref 12–16)
HU1 AB SER QL IB: NEGATIVE
HU2 AB SER QL IB: NEGATIVE
IMM GRANULOCYTES # BLD AUTO: 0.19 X10*3/UL (ref 0–0.7)
IMM GRANULOCYTES NFR BLD AUTO: 3.3 % (ref 0–0.9)
LYMPHOCYTES # BLD AUTO: 0.57 X10*3/UL (ref 1.2–4.8)
LYMPHOCYTES NFR BLD AUTO: 9.9 %
MAGNESIUM SERPL-MCNC: 1.62 MG/DL (ref 1.6–2.4)
MCH RBC QN AUTO: 30.3 PG (ref 26–34)
MCHC RBC AUTO-ENTMCNC: 30.6 G/DL (ref 32–36)
MCV RBC AUTO: 99 FL (ref 80–100)
MONOCYTES # BLD AUTO: 0.62 X10*3/UL (ref 0.1–1)
MONOCYTES NFR BLD AUTO: 10.8 %
NEUTROPHILS # BLD AUTO: 4.37 X10*3/UL (ref 1.2–7.7)
NEUTROPHILS NFR BLD AUTO: 76 %
NRBC BLD-RTO: 1.7 /100 WBCS (ref 0–0)
PCA-TR AB SER QL IB: NEGATIVE
PHOSPHATE SERPL-MCNC: 3 MG/DL (ref 2.5–4.9)
PLATELET # BLD AUTO: 66 X10*3/UL (ref 150–450)
POLYCHROMASIA BLD QL SMEAR: NORMAL
POTASSIUM SERPL-SCNC: 4.3 MMOL/L (ref 3.5–5.3)
PURKINJE CELLS AB SER QL IB: NEGATIVE
RBC # BLD AUTO: 2.74 X10*6/UL (ref 4–5.2)
RBC MORPH BLD: NORMAL
SODIUM SERPL-SCNC: 144 MMOL/L (ref 136–145)
WBC # BLD AUTO: 5.8 X10*3/UL (ref 4.4–11.3)

## 2024-01-23 PROCEDURE — 2500000004 HC RX 250 GENERAL PHARMACY W/ HCPCS (ALT 636 FOR OP/ED): Mod: MUE

## 2024-01-23 PROCEDURE — 2500000004 HC RX 250 GENERAL PHARMACY W/ HCPCS (ALT 636 FOR OP/ED)

## 2024-01-23 PROCEDURE — 73201 CT UPPER EXTREMITY W/DYE: CPT | Mod: RIGHT SIDE | Performed by: RADIOLOGY

## 2024-01-23 PROCEDURE — 82947 ASSAY GLUCOSE BLOOD QUANT: CPT

## 2024-01-23 PROCEDURE — 87040 BLOOD CULTURE FOR BACTERIA: CPT | Performed by: STUDENT IN AN ORGANIZED HEALTH CARE EDUCATION/TRAINING PROGRAM

## 2024-01-23 PROCEDURE — 36415 COLL VENOUS BLD VENIPUNCTURE: CPT | Performed by: STUDENT IN AN ORGANIZED HEALTH CARE EDUCATION/TRAINING PROGRAM

## 2024-01-23 PROCEDURE — 80069 RENAL FUNCTION PANEL: CPT

## 2024-01-23 PROCEDURE — 83735 ASSAY OF MAGNESIUM: CPT

## 2024-01-23 PROCEDURE — 99233 SBSQ HOSP IP/OBS HIGH 50: CPT | Performed by: INTERNAL MEDICINE

## 2024-01-23 PROCEDURE — 97165 OT EVAL LOW COMPLEX 30 MIN: CPT | Mod: GO

## 2024-01-23 PROCEDURE — 73201 CT UPPER EXTREMITY W/DYE: CPT | Mod: RT

## 2024-01-23 PROCEDURE — A4217 STERILE WATER/SALINE, 500 ML: HCPCS

## 2024-01-23 PROCEDURE — 2500000001 HC RX 250 WO HCPCS SELF ADMINISTERED DRUGS (ALT 637 FOR MEDICARE OP)

## 2024-01-23 PROCEDURE — 85025 COMPLETE CBC W/AUTO DIFF WBC: CPT

## 2024-01-23 PROCEDURE — 73201 CT UPPER EXTREMITY W/DYE: CPT | Mod: RT,MUE

## 2024-01-23 PROCEDURE — 1200000002 HC GENERAL ROOM WITH TELEMETRY DAILY

## 2024-01-23 PROCEDURE — 2500000002 HC RX 250 W HCPCS SELF ADMINISTERED DRUGS (ALT 637 FOR MEDICARE OP, ALT 636 FOR OP/ED)

## 2024-01-23 PROCEDURE — 97530 THERAPEUTIC ACTIVITIES: CPT | Mod: GP

## 2024-01-23 PROCEDURE — 93970 EXTREMITY STUDY: CPT | Performed by: RADIOLOGY

## 2024-01-23 PROCEDURE — 2550000001 HC RX 255 CONTRASTS: Performed by: INTERNAL MEDICINE

## 2024-01-23 RX ORDER — SODIUM CHLORIDE, SODIUM LACTATE, POTASSIUM CHLORIDE, CALCIUM CHLORIDE 600; 310; 30; 20 MG/100ML; MG/100ML; MG/100ML; MG/100ML
50 INJECTION, SOLUTION INTRAVENOUS CONTINUOUS
Status: ACTIVE | OUTPATIENT
Start: 2024-01-23 | End: 2024-01-23

## 2024-01-23 RX ORDER — INSULIN LISPRO 100 [IU]/ML
10 INJECTION, SOLUTION INTRAVENOUS; SUBCUTANEOUS ONCE
Status: COMPLETED | OUTPATIENT
Start: 2024-01-23 | End: 2024-01-23

## 2024-01-23 RX ADMIN — VANCOMYCIN HYDROCHLORIDE 1500 MG: 5 INJECTION, POWDER, LYOPHILIZED, FOR SOLUTION INTRAVENOUS at 19:16

## 2024-01-23 RX ADMIN — LEVETIRACETAM 500 MG: 5 INJECTION INTRAVENOUS at 12:21

## 2024-01-23 RX ADMIN — INSULIN LISPRO 6 UNITS: 100 INJECTION, SOLUTION INTRAVENOUS; SUBCUTANEOUS at 12:22

## 2024-01-23 RX ADMIN — FOLIC ACID 1 MG: 1 TABLET ORAL at 08:22

## 2024-01-23 RX ADMIN — HYDROCORTISONE SODIUM SUCCINATE 15 MG: 100 INJECTION, POWDER, FOR SOLUTION INTRAMUSCULAR; INTRAVENOUS at 23:58

## 2024-01-23 RX ADMIN — HEPARIN SODIUM 5000 UNITS: 5000 INJECTION INTRAVENOUS; SUBCUTANEOUS at 08:27

## 2024-01-23 RX ADMIN — HYDROCORTISONE SODIUM SUCCINATE 15 MG: 100 INJECTION, POWDER, FOR SOLUTION INTRAMUSCULAR; INTRAVENOUS at 12:22

## 2024-01-23 RX ADMIN — HYDROCORTISONE SODIUM SUCCINATE 15 MG: 100 INJECTION, POWDER, FOR SOLUTION INTRAMUSCULAR; INTRAVENOUS at 00:02

## 2024-01-23 RX ADMIN — PANTOPRAZOLE SODIUM 40 MG: 40 TABLET, DELAYED RELEASE ORAL at 05:36

## 2024-01-23 RX ADMIN — HEPARIN SODIUM 5000 UNITS: 5000 INJECTION INTRAVENOUS; SUBCUTANEOUS at 23:58

## 2024-01-23 RX ADMIN — THIAMINE HCL TAB 100 MG 100 MG: 100 TAB at 08:22

## 2024-01-23 RX ADMIN — INSULIN LISPRO 10 UNITS: 100 INJECTION, SOLUTION INTRAVENOUS; SUBCUTANEOUS at 21:09

## 2024-01-23 RX ADMIN — HYDROCORTISONE SODIUM SUCCINATE 15 MG: 100 INJECTION, POWDER, FOR SOLUTION INTRAMUSCULAR; INTRAVENOUS at 05:36

## 2024-01-23 RX ADMIN — HEPARIN SODIUM 5000 UNITS: 5000 INJECTION INTRAVENOUS; SUBCUTANEOUS at 00:02

## 2024-01-23 RX ADMIN — HEPARIN SODIUM 5000 UNITS: 5000 INJECTION INTRAVENOUS; SUBCUTANEOUS at 17:50

## 2024-01-23 RX ADMIN — SODIUM CHLORIDE, POTASSIUM CHLORIDE, SODIUM LACTATE AND CALCIUM CHLORIDE 50 ML/HR: 600; 310; 30; 20 INJECTION, SOLUTION INTRAVENOUS at 11:15

## 2024-01-23 RX ADMIN — INSULIN LISPRO 4 UNITS: 100 INJECTION, SOLUTION INTRAVENOUS; SUBCUTANEOUS at 17:50

## 2024-01-23 RX ADMIN — HYDROCORTISONE SODIUM SUCCINATE 15 MG: 100 INJECTION, POWDER, FOR SOLUTION INTRAMUSCULAR; INTRAVENOUS at 17:51

## 2024-01-23 RX ADMIN — INSULIN LISPRO 6 UNITS: 100 INJECTION, SOLUTION INTRAVENOUS; SUBCUTANEOUS at 08:22

## 2024-01-23 RX ADMIN — IOHEXOL 100 ML: 350 INJECTION, SOLUTION INTRAVENOUS at 16:16

## 2024-01-23 ASSESSMENT — COGNITIVE AND FUNCTIONAL STATUS - GENERAL
DRESSING REGULAR LOWER BODY CLOTHING: A LOT
DRESSING REGULAR UPPER BODY CLOTHING: TOTAL
DAILY ACTIVITIY SCORE: 11
MOVING TO AND FROM BED TO CHAIR: TOTAL
MOBILITY SCORE: 13
WALKING IN HOSPITAL ROOM: A LOT
HELP NEEDED FOR BATHING: A LOT
STANDING UP FROM CHAIR USING ARMS: A LOT
TURNING FROM BACK TO SIDE WHILE IN FLAT BAD: A LITTLE
STANDING UP FROM CHAIR USING ARMS: A LOT
TOILETING: TOTAL
DRESSING REGULAR UPPER BODY CLOTHING: A LOT
MOVING TO AND FROM BED TO CHAIR: A LOT
DAILY ACTIVITIY SCORE: 14
CLIMB 3 TO 5 STEPS WITH RAILING: TOTAL
MOVING FROM LYING ON BACK TO SITTING ON SIDE OF FLAT BED WITH BEDRAILS: A LITTLE
MOVING FROM LYING ON BACK TO SITTING ON SIDE OF FLAT BED WITH BEDRAILS: A LITTLE
DAILY ACTIVITIY SCORE: 6
TOILETING: A LOT
MOVING FROM LYING ON BACK TO SITTING ON SIDE OF FLAT BED WITH BEDRAILS: TOTAL
DRESSING REGULAR UPPER BODY CLOTHING: A LOT
WALKING IN HOSPITAL ROOM: A LOT
EATING MEALS: A LOT
EATING MEALS: A LITTLE
TURNING FROM BACK TO SIDE WHILE IN FLAT BAD: A LITTLE
CLIMB 3 TO 5 STEPS WITH RAILING: TOTAL
DRESSING REGULAR LOWER BODY CLOTHING: TOTAL
MOBILITY SCORE: 6
STANDING UP FROM CHAIR USING ARMS: TOTAL
PERSONAL GROOMING: TOTAL
HELP NEEDED FOR BATHING: A LOT
WALKING IN HOSPITAL ROOM: TOTAL
MOBILITY SCORE: 13
PERSONAL GROOMING: TOTAL
TURNING FROM BACK TO SIDE WHILE IN FLAT BAD: TOTAL
CLIMB 3 TO 5 STEPS WITH RAILING: TOTAL
DRESSING REGULAR LOWER BODY CLOTHING: A LOT
PERSONAL GROOMING: A LITTLE
TOILETING: A LOT
HELP NEEDED FOR BATHING: TOTAL
EATING MEALS: TOTAL
MOVING TO AND FROM BED TO CHAIR: A LOT

## 2024-01-23 ASSESSMENT — PAIN SCALES - GENERAL
PAINLEVEL_OUTOF10: 0 - NO PAIN

## 2024-01-23 ASSESSMENT — PAIN - FUNCTIONAL ASSESSMENT
PAIN_FUNCTIONAL_ASSESSMENT: 0-10
PAIN_FUNCTIONAL_ASSESSMENT: 0-10

## 2024-01-23 ASSESSMENT — ACTIVITIES OF DAILY LIVING (ADL): ADL_ASSISTANCE: INDEPENDENT

## 2024-01-23 NOTE — CARE PLAN
The patient's goals for the shift include Complete bed bath and change sheets    The clinical goals for the shift include Monitor right wrist swelling and pain management    Over the shift, the patient did not make progress toward the following goals. Barriers to progression include   . Recommendations to address these barriers include   .

## 2024-01-23 NOTE — PROGRESS NOTES
"Bing Holliday is a 63 y.o. female on day 5 of admission presenting with Meningitis.    Subjective     Pt. Seen on bed side.  She is alert / oriented , in no acute distress, looking much better as compared to yesterday.  Off isolation status today.     I have reviewed histories, allergies and medications have been reviewed and there are no changes     Last Recorded Vitals  Blood pressure 142/81, pulse 82, temperature 36.6 °C (97.9 °F), resp. rate 14, height 1.727 m (5' 7.99\"), weight 99.3 kg (218 lb 14.7 oz), SpO2 96 %.      Intake/Output last 3 Shifts:  I/O last 3 completed shifts:  In: 1350 (13.6 mL/kg) [P.O.:600; NG/GT:500; IV Piggyback:250]  Out: 1650 (16.6 mL/kg) [Urine:1650 (0.5 mL/kg/hr)]  Dosing Weight: 99.3 kg     Relevant Results  Results from last 7 days   Lab Units 01/22/24  1722 01/22/24  1232 01/22/24  0901 01/22/24  0445 01/22/24  0419 01/21/24  2103 01/21/24  0445 01/21/24  0440 01/20/24  2059 01/20/24  1836 01/20/24  0731 01/20/24  0555 01/19/24  2122 01/19/24  1842   POCT GLUCOSE mg/dL 224* 188* 179*  --  183* 164*   < >  --    < >  --    < >  --    < >  --    GLUCOSE mg/dL  --   --   --  224*  --   --   --  247*  --  144*  --  236*  --  138*    < > = values in this interval not displayed.         Current Facility-Administered Medications   Medication Dose Route Frequency Provider Last Rate Last Admin    acetaminophen (Tylenol) tablet 650 mg  650 mg nasogastric tube q6h PRN Jeniffer Vasquez MD   650 mg at 01/22/24 1335    dextrose 10 % in water (D10W) infusion  0.3 g/kg/hr intravenous Once PRN Salma Baker MD        dextrose 50 % injection 25 g  25 g intravenous q15 min PRN Salma Baker MD        pantoprazole (ProtoNix) EC tablet 40 mg  40 mg oral Daily before breakfast Jeniffer Vasquez MD   40 mg at 01/22/24 0902    Or    esomeprazole (NexIUM) suspension 40 mg  40 mg nasoduodenal tube Daily before breakfast Jeniffer Vasquez MD   40 mg at 01/21/24 0550    Or    pantoprazole " (ProtoNix) injection 40 mg  40 mg intravenous Daily before breakfast Jeniffer Vasquez MD   40 mg at 01/20/24 0538    folic acid (Folvite) tablet 1 mg  1 mg oral Daily Jeniffer Vasquez MD   1 mg at 01/22/24 0902    glucagon (Glucagen) injection 1 mg  1 mg intramuscular q15 min PRN Salma Baker MD        heparin (porcine) injection 5,000 Units  5,000 Units subcutaneous q8h Jeniffer Vasquez MD   5,000 Units at 01/22/24 1747    hydrocortisone sod succ (PF) (Solu-CORTEF) injection 15 mg  15 mg intravenous q6h Ramu Scales MD   15 mg at 01/22/24 1747    insulin lispro (HumaLOG) injection 0-10 Units  0-10 Units subcutaneous TID with meals Salma Baker MD   4 Units at 01/22/24 1747    levETIRAcetam in NaCl (iso-os) (Keppra)  mg  500 mg intravenous q12h Jeniffer Vasquez MD 0 mL/hr at 01/19/24 0602 500 mg at 01/22/24 1335    loperamide (Imodium) capsule 2 mg  2 mg oral 4x daily PRN Jeniffer Vasquez MD   2 mg at 01/21/24 1829    LORazepam (Ativan) injection 2 mg  2 mg intravenous Once PRN Jeniffer Vasquez MD        LORazepam (Ativan) injection 2 mg  2 mg intravenous Once PRN Nadiya L MD David        thiamine (Vitamin B-1) tablet 100 mg  100 mg nasogastric tube Daily Jeniffer Vasquez MD   100 mg at 01/22/24 0902       Lab Results   Component Value Date    ANIONGAP 12 01/22/2024    BUN 22 01/22/2024    CO2 29 01/22/2024    CREATININE 1.71 (H) 01/22/2024    K 4.1 01/22/2024     01/22/2024     01/22/2024    PHOS 2.4 (L) 01/22/2024    GLUCOSE 224 (H) 01/22/2024    CALCIUM 7.5 (L) 01/22/2024    EGFR 34 (L) 01/22/2024    ALBUMIN 2.2 (L) 01/22/2024    CAION 1.21 01/17/2024    PTH 44.1 04/15/2021    VITD25 34 12/10/2022      Lab Results   Component Value Date    TSH 0.55 01/16/2024    FREET4 0.86 10/19/2023      Lab Results   Component Value Date    CORTISOL 19.0 11/26/2023    ACTH 2.5 (L) 11/21/2023    TSH 0.55 01/16/2024    FREET4 0.86 10/19/2023    PROLACTIN 42.0 (H) 01/16/2024            Assessment/Plan   Principal Problem:    Meningitis  Active Problems:    Encephalopathy    Seizure (CMS/HCC)    Bing Holliday is a 63 years old patient with PMH of type 2 diabetes (last A1c 6.9% in 7/23 , steroid-induced), CKD Stage 3 (baseline Cr ~1.9) , 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 difficile  (2/2023 ), history of norovirus (detected 11/23), recent diagnosis of secondary adrenal insufficiency? (With prednisone 10 mg +5 mg daily),  presented to [facility] on 1/14 with progressive weakness, confusion, diarrhea , back pain, initially admitted for management of secondary adrenal insufficiency, developed seizure-like activity, started on antiepileptic medications, Transferred on 1/17 to [facility] MICU to rule out meningitis (LP), continuous video EEG, and rheumatology consult.     Endocrinology consulted for management of adrenal insufficiency and steroid dosing.     Patient has secondary adrenal insufficiency, less likely develop adrenal crisis as a possible explanation for the current presentation.    Update: 1/22/24  - Imaging and labs work, inculding CSF studies unremarkable. Most likely cause of AMS at this point believed to be delirium secondary to multiple admissions and seizures.   - SLP evaluated, DHT tube pulled as patient tolerated PO well.    - per rheum, normal rheumatologic work-up goes against active SLE (lupus cerebritis)  - per neuro, less likely lupus cerebritis, likely delirium given recurrent admissions and seizures  - per ID, unlikely infectious, stop empiric meningitis tx and IV acyclovir    Initially, was taking IV HC 25 mg Q6 hourly, dose was reduced to 15 mg Q6 hourly on Friday.  Today, pt. off isolation , looking improved.  We are suggesting to switch to PO prednisone 15  mg daily for 2 weeks , then to taper it to 12.5 mg daily for 2 weeks and then to 10 mg daily.  Pt. needs follow up with rheumatology for further management  (assessment for further weaning down?)    -No need for any testing regarding the HPA axis, as patient has known AI secondary to exogenous chronic glucocorticoid use.     Case discussed with Dr. Calix.      Florencia Lyman MD

## 2024-01-23 NOTE — PROGRESS NOTES
"Bing Holliday is a 63 y.o. female on day 6 of admission presenting with Meningitis.    Subjective   NAEON. AOx3    Subjectively concerned w/ R hand swelling.     Denies N/V/D, CP, SOB.    Endorsed 1x reading of fever overnight but not feeling febrile.     Objective     Physical Exam  Constitutional:       General: She is not in acute distress.     Appearance: She is not ill-appearing or diaphoretic.   HENT:      Head: Normocephalic and atraumatic.      Nose: Nose normal.   Eyes:      Extraocular Movements: Extraocular movements intact.      Pupils: Pupils are equal, round, and reactive to light.   Cardiovascular:      Rate and Rhythm: Normal rate and regular rhythm.   Pulmonary:      Effort: Pulmonary effort is normal.      Breath sounds: Normal breath sounds.   Abdominal:      General: Abdomen is flat.      Palpations: Abdomen is soft.   Musculoskeletal:      Cervical back: Normal range of motion and neck supple.   Skin:     General: Skin is warm and dry.   Neurological:      Mental Status: She is alert.         Last Recorded Vitals  Blood pressure 125/77, pulse 71, temperature 35.8 °C (96.4 °F), temperature source Tympanic, resp. rate 17, height 1.727 m (5' 7.99\"), weight 99.3 kg (218 lb 14.7 oz), SpO2 98 %.  Intake/Output last 3 Shifts:  I/O last 3 completed shifts:  In: 360 (3.6 mL/kg) [P.O.:360]  Out: 1850 (18.6 mL/kg) [Urine:1850 (0.5 mL/kg/hr)]  Dosing Weight: 99.3 kg     Relevant Results  Results for orders placed or performed during the hospital encounter of 01/17/24 (from the past 24 hour(s))   POCT GLUCOSE   Result Value Ref Range    POCT Glucose 224 (H) 74 - 99 mg/dL   POCT GLUCOSE   Result Value Ref Range    POCT Glucose 304 (H) 74 - 99 mg/dL   Magnesium   Result Value Ref Range    Magnesium 1.62 1.60 - 2.40 mg/dL   Renal Function Panel   Result Value Ref Range    Glucose 334 (H) 74 - 99 mg/dL    Sodium 144 136 - 145 mmol/L    Potassium 4.3 3.5 - 5.3 mmol/L    Chloride 108 (H) 98 - 107 mmol/L    " Bicarbonate 27 21 - 32 mmol/L    Anion Gap 13 10 - 20 mmol/L    Urea Nitrogen 23 6 - 23 mg/dL    Creatinine 1.93 (H) 0.50 - 1.05 mg/dL    eGFR 29 (L) >60 mL/min/1.73m*2    Calcium 7.7 (L) 8.6 - 10.6 mg/dL    Phosphorus 3.0 2.5 - 4.9 mg/dL    Albumin 2.2 (L) 3.4 - 5.0 g/dL   CBC and Auto Differential   Result Value Ref Range    WBC 5.8 4.4 - 11.3 x10*3/uL    nRBC 1.7 (H) 0.0 - 0.0 /100 WBCs    RBC 2.74 (L) 4.00 - 5.20 x10*6/uL    Hemoglobin 8.3 (L) 12.0 - 16.0 g/dL    Hematocrit 27.1 (L) 36.0 - 46.0 %    MCV 99 80 - 100 fL    MCH 30.3 26.0 - 34.0 pg    MCHC 30.6 (L) 32.0 - 36.0 g/dL    RDW 19.6 (H) 11.5 - 14.5 %    Platelets 66 (L) 150 - 450 x10*3/uL    Neutrophils % 76.0 40.0 - 80.0 %    Immature Granulocytes %, Automated 3.3 (H) 0.0 - 0.9 %    Lymphocytes % 9.9 13.0 - 44.0 %    Monocytes % 10.8 2.0 - 10.0 %    Eosinophils % 0.0 0.0 - 6.0 %    Basophils % 0.0 0.0 - 2.0 %    Neutrophils Absolute 4.37 1.20 - 7.70 x10*3/uL    Immature Granulocytes Absolute, Automated 0.19 0.00 - 0.70 x10*3/uL    Lymphocytes Absolute 0.57 (L) 1.20 - 4.80 x10*3/uL    Monocytes Absolute 0.62 0.10 - 1.00 x10*3/uL    Eosinophils Absolute 0.00 0.00 - 0.70 x10*3/uL    Basophils Absolute 0.00 0.00 - 0.10 x10*3/uL   Morphology   Result Value Ref Range    RBC Morphology See Below     Polychromasia Mild     Florence Cells Few    POCT GLUCOSE   Result Value Ref Range    POCT Glucose 258 (H) 74 - 99 mg/dL   Blood Culture    Specimen: Peripheral Venipuncture; Blood culture   Result Value Ref Range    Blood Culture Loaded on Instrument - Culture in progress    Blood Culture    Specimen: Peripheral Arterial Puncture; Blood culture   Result Value Ref Range    Blood Culture Loaded on Instrument - Culture in progress    POCT GLUCOSE   Result Value Ref Range    POCT Glucose 282 (H) 74 - 99 mg/dL   POCT GLUCOSE   Result Value Ref Range    POCT Glucose 225 (H) 74 - 99 mg/dL      Assessment/Plan   Principal Problem:    Meningitis  Active Problems:     Encephalopathy    Seizure (CMS/HCC)    Bing Holliday is a 63 y.o. 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 initially c/f adrenal crisis from sepsis, but now transferred for inpatient rheum consult iso c/f lupus cerebritis vs meningitis vs adrenal insufficiency as etiology for encephalopathy. ID, endo, rheum and neuro consulted. LP completee 1/18. Imaging and labs work, inculding CSF studies unremarkable. Most likely cause of Ams at this point believed to be delirium s/t multiple admissions and seizures. Started on keppra by neuro. Mentation has continued to improve since admission, now pending dispo. On 1/22/23, R hand became warm and started swelling. DVT U/S revealed partial occlusion of basilic vein no DVT.      Updates 1/23/24:  - per neuro, AMS likely s/t delirium and seizures, continue keppra 500 BID. Continues to be AO x3   - DVTU/S revealed parital osclusive superficial thrombus within basiclic vein but no DVT.  - Concerns for infection of R hand w/ possible septic thrombophlebitis given swelling and basilic vein. Ordered CT w Con of RUE, pending.  - Initiated vancomycin renally dosed by pharmacy  - PT/Ot rec' SNF, will f/u with son regarding dispo  - Per Endo; We are suggesting to switch to PO prednisone 15 mg daily for 2 weeks , then to taper it to 12.5 mg daily for 2 weeks and then to 10 mg daily. Will reach out to rheumatology to confirm taper.     #Encephalopathy  :: per daughter, worsening confusion over past year with several admissions since November for AMS (hypoglycemia, UTI, adrenal crisis s/t norovirus)  -1/15 MRI Brain w and w/o contrast demonstrated focal encephalomalacia in L occipital region unchanged from previous exam. Hemosiderin deposit in L occipital region consistent w/  remote infarction/contusion. Scattered foci w/ increased signal in subcortical and periventricular white matter consistent w/ demyelination (new finding).    -Per neuro periventricular/juxtacortical white matter changes on FLAIR imaging similar to imaging from 03/2023. These changes less suggestive of lupus cerebritis and more suggestive of chronic age related white matter changes (as minimal to no white matter changes were seen on MRI from 2005)  -vEEG w/o epileptiform activity   -TSH, B12, folate wnl  -anti-dsDNA Ab negative, anti-ribosomal Ab negative  -KATHI panel w/ positive anti-SM/RNP and anti-chromatin ab positive,  C3/C4 wnl  -LP completed 1/18  -CSF glucose, protein normal, wbc 4, Crypto antigen negative, HSV pcr negative, cultures, meningitis/encephalitis panels negative, anti-neuronal Ab negative  -anti-ribosomal P ab, oligoclonal bands, IgG index, Cryptococcal Ab pending  -per rheum, normal rheumatologic work-up goes against active SLE (lupus cerebritis)  -per neuro, less likely lupus cerebritis, likely delirium given recurrent admissions and seizures  -per ID, unlikely infectious, stop empiric meningitis tx and IV acyclovir  -S/p keppra load, avitan at OSH, keppra continued 500 BID per neuro  - Ativan 2 mg IV for prolonged motor seizure lasting more than 3 minutes or a cluster of 3 or more motor seizures in an 8 hour period.     #SLE  - labs as above  - Holding MMF (1/16) in setting of presumed infection  - Continue on stress dose steroids as below  - Rheumatology is following. Further recommendations based on results of LP    #R hand edema  #Thrombophlebitis  ::On 1/22/23, R hand became warm and started swelling. DVT U/S revealed partial occlusion of basilic vein no DVT.   - DVTU/S revealed parital osclusive superficial thrombus within basiclic vein but no DVT.  - Concerns for infection of R hand w/ possible septic thrombophlebitis given swelling and basilic vein. Ordered CT w Con of RUE, pending.  -  Initiated vancomycin renally dosed by pharmacy    #Hypernatremia  -Na up to 152  -likely due to NPO status  -adjust FWFs to Na  -Daily RFP     #c diff, recurrent episode?  (tx'ed w/PO vanc pulse dose in 3/2023 with taper, metronidazole 2/2023)  - as per chart review, has chronic diarrhea with IBS-type picture?  - diarrhea with positive PCR positive, EIA negative 1/18  - PCR was positive 5/2022, 10/2022, 2/2023. 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  -endocrine following, low concern for adrenal crisis  -on chronic prednisone (10mg + 5mg)   -started stress dose hydrocortisone 25mg q6hrs ([id number] on 1/14) in light of presumed infection, decreased to 15mg q6h 1/20  -touch base about home going steroid plan     #Paroxysmal Atrial fibrillation on eliquis  #Intermittent sinus bradycardia  -Chadsvasc 4  -DOAC held 1/15 in anticipation of LP, started SQH for dvt ppx  -Resume DOAC prior to DC  -continue telemetry     #Chronic Pancytopenia  - likely s/t lupus  - Retic count 0.02 (LLN 0.018), TIBC low, % sat low, ferritin elevated 1/16  - Has required 2 units of packed red blood cells on 1/16 1/17  - Hemoglobin goal greater than 7, platelets greater than 10, if bleeding platelets greater than 50.     #JED on CKD 3, resolved  - Bl ~ 1.6 -1.8, Cr trend 2.24 -> 1.28  - Improved with fluids     #CAD s/p CABG 2013  -statin    Dispo: PT/OT rec' SNF, pending discussion with son     F: Replete PRN  E: Replete PRN  N: TFs via DHT  DVT Ppx: SQH  GI Ppx: Esomeprazole  Access/Lines: 2 PIV, midline 1/16/ [place] 1/16     Code status: DNR and No Intubation  NOK: López Lang (son) [phone] (Home Phone)         Kirk Cervantes MD PhD  Internal Medicine, PGY1

## 2024-01-23 NOTE — CARE PLAN
Problem: Pain  Goal: My pain/discomfort is manageable  Outcome: Progressing   The patient's goals for the shift include decrease in pain    The clinical goals for the shift include monitor pt's right hand swelling for S/S of infection

## 2024-01-23 NOTE — PROGRESS NOTES
Physical Therapy    Physical Therapy Treatment    Patient Name: Bing Holliday  MRN: [id number]  Today's Date: 1/23/2024  Time Calculation  Start Time: 1045  Stop Time: 1111  Time Calculation (min): 26 min       Assessment/Plan   PT Assessment  PT Assessment Results: Decreased strength, Decreased range of motion, Decreased endurance, Impaired balance, Decreased mobility, Decreased coordination, Pain  End of Session Communication: Bedside nurse  Assessment Comment: Pt with improving mobility though still requires assist for basics, able to stand and amb short distance with assist this date.  Will continue to benefit from skilled PT to progress towards (I) functional mobility.  End of Session Patient Position: Up in chair, Alarm off, caregiver present       PT Discharge Recommendations: Moderate intensity level of continued care       01/23/24 1045   PT  Visit   PT Received On 01/23/24   General   Co-Treatment OT   Co-Treatment Reason Anticipated need for 2 person skilled assist to progress safely with mobility due to AMPAC 8 on initial eval.   Prior to Session Communication Bedside nurse   General Comment Pt agreeable to participate, able to mobilize to EOB and OOB this session with assist, up in bedside chair at end of session.  Will continue to follow.   Precautions   Medical Precautions Fall precautions;Seizure precautions   Pain Assessment   Pain Location Hand   Pain Orientation Right  (visible swollen throughout hand)   Cognition   Arousal/Alertness Appropriate responses to stimuli   Orientation Level Disoriented to time  (Feb 202012 initially, self corrects to Jan but repeats 202012 for year)   Following Commands Follows one step commands with increased time   Cognition Comments expresses frustration that she is below her baseline for mobility though responds well to positives noted by therapists on her performance   Static Sitting Balance   Static Sitting-Balance Support Bilateral upper extremity supported    Static Sitting-Level of Assistance Distant supervision   Static Standing Balance   Static Standing-Balance Support Bilateral upper extremity supported   Static Standing-Level of Assistance Minimum assistance   Static Standing-Comment/Number of Minutes whw   Dynamic Standing Balance   Dynamic Standing-Balance Support Bilateral upper extremity supported   Dynamic Standing-Comments mod X1 with whw   RUE    RUE  X   RUE AROM (degrees)   RUE AROM Comment baseline with limited ROM at fingers though now visibly swollen as well, able to have pincer grasp loosely   LUE    LUE X   LUE AROM (degrees)   LUE AROM Comment baseline limited ROM in fingers, more stronger pincer grasp with index and thumb as compared to (R)   RLE    RLE  X   Strength RLE   RLE Overall Strength Greater than or equal to 3/5 as evidenced by functional mobility   LLE    LLE  X   Strength LLE   LLE Overall Strength Greater than or equal to 3/5 as evidenced by functional mobility   Balance/Neuromuscular Re-Education   Balance/Neuromuscular Re-Education Activity Performed Yes   Balance/Neuromuscular Re-Education Activity 1 with min -mod Ax1 and whw, attempted marches in place, pt able to clear floor with (L) foot however unable to with (R), appears hesistant to weight shift more onto (L) LE despite assist, is unable to fully utilize (B) UE support on walker due to  and pain/swelling in (R) hand   Bed Mobility   Bed Mobility Yes   Bed Mobility 1   Bed Mobility 1 Supine to sitting   Level of Assistance 1 Contact guard;Minimal verbal cues  (HOB raised)   Bed Mobility Comments 1 completes slowly   Ambulation/Gait Training   Ambulation/Gait Training Performed Yes   Ambulation/Gait Training 1   Surface 1 Level tile   Device 1 Rolling walker   Assistance 1 Moderate verbal cues;Moderate tactile cues;Minimum assistance  (x2)   Quality of Gait 1   (toes on (L) remain in extension with pt more heel weightbearing on (L), manual assist to manipulate whw, limited  time in stance on (L) but able to take quick side step with (R) LE/clear floor)   Comments/Distance (ft) 1 4 side steps   Ambulation/Gait Training 2   Surface 2 Level tile   Device 2 Rolling walker   Assistance 2 Minimum assistance;Minimal verbal cues;Moderate tactile cues  (x2)   Comments/Distance (ft) 2 pivoting/turning side steps to bedside chair x 3, similar appearance to first bout of gait   Transfers   Transfer Yes   Transfer 1   Transfer From 1 Bed to   Transfer to 1 Stand   Transfer Device 1 Walker  (bed height elevated)   Transfer Level of Assistance 1 Minimum assistance;Minimal verbal cues  (x1)   Transfers 2   Transfer From 2 Stand to   Transfer to 2 Bed   Transfer Level of Assistance 2 Minimum assistance;Minimal verbal cues  (x1)   Transfers 3   Transfer From 3 Bed to   Transfer to 3 Stand   Transfer Device 3 Walker   Transfer Level of Assistance 3 Minimum assistance;Minimal verbal cues  (x1)   Transfers 4   Transfer From 4 Stand to   Transfer to 4 Chair with arms   Transfer Level of Assistance 4 Moderate assistance;Minimal verbal cues  (x1)   Trials/Comments 4 increased assist to control descent to lower bedside chair   Activity Tolerance   Endurance Tolerates 10 - 20 min exercise with multiple rests   PT Assessment   PT Assessment Results Decreased strength;Decreased range of motion;Decreased endurance;Impaired balance;Decreased mobility;Decreased coordination;Pain   End of Session Communication Bedside nurse   Assessment Comment Pt with improving mobility though still requires assist for basics, able to stand and amb short distance with assist this date.  Will continue to benefit from skilled PT to progress towards (I) functional mobility.   End of Session Patient Position Up in chair;Alarm off, caregiver present     Outcome Measures:  [facility] Basic Mobility  Turning from your back to your side while in a flat bed without using bedrails: A little  Moving from lying on your back to sitting on the side of  a flat bed without using bedrails: A little  Moving to and from bed to chair (including a wheelchair): A lot  Standing up from a chair using your arms (e.g. wheelchair or bedside chair): A lot  To walk in hospital room: A lot  Climbing 3-5 steps with railing: Total  Basic Mobility - Total Score: 13                       EDUCATION:     Education Documentation  Mobility Training, taught by Corine Haynes, PT at 1/23/2024 12:36 PM.  Learner: Patient  Readiness: Acceptance  Method: Explanation, Demonstration  Response: Verbalizes Understanding    GOALS:  Encounter Problems       Encounter Problems (Active)       Balance       STG - Maintains static sitting balance without upper extremity support >/= 10 min  (Progressing)       Start:  01/20/24    Expected End:  02/03/24       INTERVENTIONS:  1. Practice sitting on the edge of a bed/mat with minimal support.  2. Educate patient about maintining total hip precautions while maintaining balance.  3. Educate patient about pressure relief.  4. Educate patient about use of assistive device.            Mobility       STG - Patient will ambulate >/= 25 ft with min A using RW (Progressing)       Start:  01/20/24    Expected End:  02/03/24               Transfers       STG - Transfer from bed to chair with min A using RW (Progressing)       Start:  01/20/24    Expected End:  02/03/24            STG - Patient will perform bed mobility with SBA (Progressing)       Start:  01/20/24    Expected End:  02/03/24            STG - Patient will transfer sit to and from stand with CGA and RW (Progressing)       Start:  01/20/24    Expected End:  02/03/24 01/23/24 at 12:38 PM - Corine Haynes, PT n

## 2024-01-23 NOTE — PROGRESS NOTES
Bing Holliday is a 63 y.o. female on day 6 of admission presenting with Meningitis.    Subjective   Careport reviewed and noted that [facility] is able to accept patient at discharge.    Call placed to patients son López who confirmed that this is FOC. Aware that insurance precert process will be initiated today and SW will update once auth is received.     Message sent to Direct Submit Team and requested that precert be initiated; 7000 completed in HENS.    -Gogo BENSON, MA, LSW  [phone] or [facility] Secure Chat  Care Transitions

## 2024-01-23 NOTE — SIGNIFICANT EVENT
Ms. Holliday requires a CT scan with contrast to assess for necrotizing thrombophlebitis and cellulitis in her right upper extremity. Although her GFR is <20, the benefits of the CT scan outweigh the risks of receiving IV contrast. The primary team will continue to monitor the patient's kidney function and manage appropriately.

## 2024-01-23 NOTE — SIGNIFICANT EVENT
Ms. Holliday needs a CT scan w/ contrast to assess for cellulitis in her left upper extremity. Although her GFR is<30, the benefits of obtaining the CT scan outweigh the risks of receiving IV contrast. The primary team will continue to monitor the patient's kidney function and intervene if her creatinine clearance declines.

## 2024-01-23 NOTE — PROGRESS NOTES
"Vancomycin Dosing by Pharmacy- INITIAL    Bing Holliday is a 63 y.o. year old female who Pharmacy has been consulted for vancomycin dosing for cellulitis, skin and soft tissue. Based on the patient's indication and renal status this patient will be dosed based on a goal trough/random level of 10-15.     Renal function is currently declining.    Visit Vitals  /77 (BP Location: Left arm, Patient Position: Sitting)   Pulse 71   Temp 35.8 °C (96.4 °F) (Tympanic)   Resp 17        Lab Results   Component Value Date    CREATININE 1.93 (H) 01/23/2024    CREATININE 1.71 (H) 01/22/2024    CREATININE 1.59 (H) 01/21/2024    CREATININE 1.58 (H) 01/20/2024        Patient weight is No results found for: \"PTWEIGHT\"    No results found for: \"CULTURE\"     I/O last 3 completed shifts:  In: 360 (3.6 mL/kg) [P.O.:360]  Out: 1850 (18.6 mL/kg) [Urine:1850 (0.5 mL/kg/hr)]  Dosing Weight: 99.3 kg   [unfilled]    Lab Results   Component Value Date    PATIENTTEMP 37.0 01/18/2024    PATIENTTEMP 37.0 11/13/2022          Assessment/Plan     Patient will not be given a loading dose.  Will initiate vancomycin maintenance, a one time dose of 1500 mg.  Follow-up level will be ordered on 1/24 in the PM, 24H after dose is given, unless clinically indicated sooner.  Will continue to monitor renal function daily while on vancomycin and order serum creatinine at least every 48 hours if not already ordered.  Follow for continued vancomycin needs, clinical response, and signs/symptoms of toxicity.       Chiqui Schuster, PharmD       "

## 2024-01-23 NOTE — PROGRESS NOTES
"Occupational Therapy    Evaluation    Patient Name: Bing Holliday  MRN: [id number]  Today's Date: 1/23/2024  Time Calculation  Start Time: 1046  Stop Time: 1111  Time Calculation (min): 25 min    Assessment  IP OT Assessment  End of Session Communication: Bedside nurse  End of Session Patient Position: Up in chair, Alarm off, caregiver present  Plan:  OT Frequency: 3 times per week  OT Discharge Recommendations: Moderate intensity level of continued care  OT - OK to Discharge:  (OT Eval completed.)    Subjective   Current Problem:  1. Meningitis        2. 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HCC)  CANCELED: Upper extremity venous duplex bilateral    CANCELED: Upper extremity venous duplex bilateral      3. Localized edema  CANCELED: Upper extremity venous duplex bilateral    CANCELED: Upper extremity venous duplex bilateral      4. Thrombophlebitis of superficial veins of left lower extremity  Upper extremity venous duplex bilateral    Upper extremity venous duplex bilateral    CANCELED: Upper extremity venous duplex bilateral    CANCELED: Upper extremity venous duplex bilateral        General:  Co-Treatment: PT  Co-Treatment Reason: Seen with PT to maximize pt's functional performance and safety in session.  Prior to Session Communication: Bedside nurse  General Comment: Pt received in supine, pleasant and cooperative. Expressed frustration with current functional status, reassurance provided.   Precautions:  Medical Precautions: Fall precautions, Seizure precautions       Pain:  Pain Assessment  Pain Location: Hand  Pain Orientation: Right (Appeared mildly swollen and reddened.)  Lines/Tubes/Drains:  PICC - Adult 01/16/24 Double lumen Right Brachial vein (Active)   Number of days: 6       Urethral Catheter Double-lumen 16 Fr. (Active)   Number of days: 6         Objective   Cognition:  Orientation Level:  (Oriented to self, hospital, month, stated 2x it was \"202012\". Will continue to assess.)  Following Commands:  (Follows one " step simple commands with increased time.)           Home Living:  Type of Home: House  Lives With: Adult children  Home Adaptive Equipment: Walker rolling or standard  Home Layout: Two level, Stairs to alternate level with rails, 1/2 bath on main level, Able to live on main level with bedroom/bathroom (Reports that if she needs to shower she needs to go up or down a flight of stairs. Reports that she prefers the basement shower stall.)  Bathroom Equipment: Shower chair with back (walk in shower)   Prior Function:  Level of [place]: Independent with ADLs and functional transfers, Needs assistance with homemaking  ADL Assistance: Independent  Homemaking Assistance: Needs assistance (Pt reports son assists.)  Ambulatory Assistance: Independent (RW for household mobility, rollator for community.)  Hand Dominance: Right       ADL:  LE Dressing Assistance:  (Pt politely declined, anticipate at least mod assist based on pt's functional performance in other tasks in session.)  Toileting Deficit:  (Anticipate at least mod assist based on pt's functional performance in other tasks in session.)  Activity Tolerance:  Endurance: Tolerates 10 - 20 min exercise with multiple rests  Balance:  Static Sitting Balance  Static Sitting-Level of Assistance: Close supervision  Bed Mobility/Transfers: Bed Mobility  Bed Mobility: Yes  Bed Mobility 1  Bed Mobility 1: Supine to sitting  Level of Assistance 1: Contact guard   and Transfers  Transfer: Yes  Transfer 1  Transfer From 1: Sit to, Stand to  Transfer to 1: Stand, Sit  Technique 1: Sit to stand, Stand to sit  Transfer Device 1: Walker  Transfer Level of Assistance 1: Minimum assistance, Minimal verbal cues  Transfers 2  Transfer From 2: Bed to  Transfer to 2: Chair with arms  Technique 2:  (3-4 steps toward B/S chair.)  Transfer Device 2: Walker  Transfer Level of Assistance 2: Minimum assistance (x2)  Trials/Comments 2: Required cues to sequence stepping and or safe management  "of RW in task       Vision: Vision - Basic Assessment  Current Vision: Wears glasses all the time   and       Strength:  Strength Comments:  (Not formally assessed, however, appears at least 4/5 grossly within baseline limitations.)  Hand Function:  Hand Function  Gross Grasp:  (Limited overall, however, pt able to grasp objects with a pincer grasp B/L UE's, states she has \"learned how to adapt\" despite baseline limitations.)  Extremities: RUE   RUE : Exceptions to WFL  RUE AROM (degrees)  RUE AROM Comment: baseline with limited ROM at fingers though now visibly swollen as well, able to have pincer grasp loosely. Appeared to have slightly improved FMC/functionalality with left > right hand for functional tasks., LUE   LUE: Exceptions to WFL  LUE AROM (degrees)  LUE AROM Comment: baseline limited ROM in fingers, more stronger pincer grasp with index and thumb as compared to (R),  , and      Outcome Measures: [facility] Daily Activity  Putting on and taking off regular lower body clothing: A lot  Bathing (including washing, rinsing, drying): A lot  Putting on and taking off regular upper body clothing: A lot  Toileting, which includes using toilet, bedpan or urinal: A lot  Taking care of personal grooming such as brushing teeth: A little  Eating Meals: A little  Daily Activity - Total Score: 14         ,     OT Adult Other Outcome Measures  4AT: 0    Education Documentation  Precautions, taught by Veronica Huber OT at 1/23/2024 12:41 PM.  Learner: Patient  Readiness: Acceptance  Method: Explanation  Response: Needs Reinforcement    ADL Training, taught by Veronica Huber OT at 1/23/2024 12:41 PM.  Learner: Patient  Readiness: Acceptance  Method: Explanation  Response: Needs Reinforcement    Education Comments  No comments found.        Goals:   Encounter Problems       Encounter Problems (Active)       ADLs       Patient will perform UB and LB bathing  with modified independent level of assistance and shower chair and " long-handled sponge.       Start:  01/23/24    Expected End:  02/06/24            Patient with complete upper body dressing with modified independent level of assistance donning and doffing all UE clothes with PRN adaptive equipment, zipper pull, and button aid while edge of bed        Start:  01/23/24    Expected End:  02/06/24            Patient with complete lower body dressing with modified independent level of assistance donning and doffing all LE clothes  with PRN adaptive equipment, reacher, shoe horn, sock-aid, and zipper pull while edge of bed  and standing       Start:  01/23/24    Expected End:  02/06/24            Patient will complete toileting including hygiene clothing management/hygiene with modified independent level of assistance and raised toilet seat.       Start:  01/23/24    Expected End:  02/06/24               Balance       STG - Maintains static sitting balance without upper extremity support >/= 10 min  (Progressing)       Start:  01/20/24    Expected End:  02/03/24       INTERVENTIONS:  1. Practice sitting on the edge of a bed/mat with minimal support.  2. Educate patient about maintining total hip precautions while maintaining balance.  3. Educate patient about pressure relief.  4. Educate patient about use of assistive device.            MOBILITY       Patient will perform Functional mobility  Household distances/Community Distances with modified independent level of assistance and least restrictive device in order to improve safety and functional mobility.       Start:  01/23/24    Expected End:  02/06/24               Mobility       STG - Patient will ambulate >/= 25 ft with min A using RW (Progressing)       Start:  01/20/24    Expected End:  02/03/24               TRANSFERS       Patient will perform bed mobility modified independent level of assistance  in order to improve safety and independence with mobility       Start:  01/23/24    Expected End:  02/06/24            Patient will  complete functional transfer to all surfaces with least restrictive device with modified independent level of assistance.       Start:  01/23/24    Expected End:  02/06/24               Transfers       STG - Transfer from bed to chair with min A using RW (Progressing)       Start:  01/20/24    Expected End:  02/03/24            STG - Patient will perform bed mobility with SBA (Progressing)       Start:  01/20/24    Expected End:  02/03/24            STG - Patient will transfer sit to and from stand with CGA and RW (Progressing)       Start:  01/20/24    Expected End:  02/03/24 01/23/24 at 12:42 PM   Veronica Huber OT   Rehab Office: [phone]

## 2024-01-24 LAB
ALBUMIN SERPL BCP-MCNC: 2.5 G/DL (ref 3.4–5)
ANION GAP SERPL CALC-SCNC: 13 MMOL/L (ref 10–20)
BASOPHILS # BLD AUTO: 0.02 X10*3/UL (ref 0–0.1)
BASOPHILS NFR BLD AUTO: 0.4 %
BUN SERPL-MCNC: 24 MG/DL (ref 6–23)
C NEOFORM AB TITR SER AGGL: NEGATIVE {TITER}
CALCIUM SERPL-MCNC: 8.4 MG/DL (ref 8.6–10.6)
CHLORIDE SERPL-SCNC: 105 MMOL/L (ref 98–107)
CO2 SERPL-SCNC: 27 MMOL/L (ref 21–32)
CREAT SERPL-MCNC: 1.91 MG/DL (ref 0.5–1.05)
EGFRCR SERPLBLD CKD-EPI 2021: 29 ML/MIN/1.73M*2
EOSINOPHIL # BLD AUTO: 0.01 X10*3/UL (ref 0–0.7)
EOSINOPHIL NFR BLD AUTO: 0.2 %
ERYTHROCYTE [DISTWIDTH] IN BLOOD BY AUTOMATED COUNT: 19.2 % (ref 11.5–14.5)
GLUCOSE BLD MANUAL STRIP-MCNC: 251 MG/DL (ref 74–99)
GLUCOSE BLD MANUAL STRIP-MCNC: 277 MG/DL (ref 74–99)
GLUCOSE BLD MANUAL STRIP-MCNC: 288 MG/DL (ref 74–99)
GLUCOSE BLD MANUAL STRIP-MCNC: 352 MG/DL (ref 74–99)
GLUCOSE SERPL-MCNC: 272 MG/DL (ref 74–99)
HCT VFR BLD AUTO: 31.3 % (ref 36–46)
HGB BLD-MCNC: 9 G/DL (ref 12–16)
IMM GRANULOCYTES # BLD AUTO: 0.19 X10*3/UL (ref 0–0.7)
IMM GRANULOCYTES NFR BLD AUTO: 3.5 % (ref 0–0.9)
LYMPHOCYTES # BLD AUTO: 0.64 X10*3/UL (ref 1.2–4.8)
LYMPHOCYTES NFR BLD AUTO: 11.6 %
MAGNESIUM SERPL-MCNC: 1.76 MG/DL (ref 1.6–2.4)
MCH RBC QN AUTO: 29.1 PG (ref 26–34)
MCHC RBC AUTO-ENTMCNC: 28.8 G/DL (ref 32–36)
MCV RBC AUTO: 101 FL (ref 80–100)
MONOCYTES # BLD AUTO: 0.52 X10*3/UL (ref 0.1–1)
MONOCYTES NFR BLD AUTO: 9.5 %
NEUTROPHILS # BLD AUTO: 4.12 X10*3/UL (ref 1.2–7.7)
NEUTROPHILS NFR BLD AUTO: 74.8 %
NRBC BLD-RTO: 2.4 /100 WBCS (ref 0–0)
PHOSPHATE SERPL-MCNC: 2.8 MG/DL (ref 2.5–4.9)
PLATELET # BLD AUTO: 82 X10*3/UL (ref 150–450)
POTASSIUM SERPL-SCNC: 4 MMOL/L (ref 3.5–5.3)
RBC # BLD AUTO: 3.09 X10*6/UL (ref 4–5.2)
SODIUM SERPL-SCNC: 141 MMOL/L (ref 136–145)
VANCOMYCIN SERPL-MCNC: 11.7 UG/ML (ref 5–20)
WBC # BLD AUTO: 5.5 X10*3/UL (ref 4.4–11.3)

## 2024-01-24 PROCEDURE — 83735 ASSAY OF MAGNESIUM: CPT

## 2024-01-24 PROCEDURE — 2500000001 HC RX 250 WO HCPCS SELF ADMINISTERED DRUGS (ALT 637 FOR MEDICARE OP)

## 2024-01-24 PROCEDURE — 99233 SBSQ HOSP IP/OBS HIGH 50: CPT | Performed by: INTERNAL MEDICINE

## 2024-01-24 PROCEDURE — 1200000002 HC GENERAL ROOM WITH TELEMETRY DAILY

## 2024-01-24 PROCEDURE — 80069 RENAL FUNCTION PANEL: CPT

## 2024-01-24 PROCEDURE — 36415 COLL VENOUS BLD VENIPUNCTURE: CPT

## 2024-01-24 PROCEDURE — A4217 STERILE WATER/SALINE, 500 ML: HCPCS

## 2024-01-24 PROCEDURE — 2500000004 HC RX 250 GENERAL PHARMACY W/ HCPCS (ALT 636 FOR OP/ED)

## 2024-01-24 PROCEDURE — 80202 ASSAY OF VANCOMYCIN: CPT

## 2024-01-24 PROCEDURE — 85025 COMPLETE CBC W/AUTO DIFF WBC: CPT

## 2024-01-24 PROCEDURE — 82947 ASSAY GLUCOSE BLOOD QUANT: CPT

## 2024-01-24 PROCEDURE — 83036 HEMOGLOBIN GLYCOSYLATED A1C: CPT

## 2024-01-24 RX ORDER — ACETAMINOPHEN 325 MG/1
975 TABLET ORAL 3 TIMES DAILY PRN
Status: DISCONTINUED | OUTPATIENT
Start: 2024-01-24 | End: 2024-01-28 | Stop reason: HOSPADM

## 2024-01-24 RX ORDER — PREDNISONE 5 MG/1
15 TABLET ORAL DAILY
Status: DISCONTINUED | OUTPATIENT
Start: 2024-01-24 | End: 2024-01-28 | Stop reason: HOSPADM

## 2024-01-24 RX ORDER — HYDROMORPHONE HYDROCHLORIDE 1 MG/ML
0.4 INJECTION, SOLUTION INTRAMUSCULAR; INTRAVENOUS; SUBCUTANEOUS EVERY 4 HOURS PRN
Status: DISCONTINUED | OUTPATIENT
Start: 2024-01-24 | End: 2024-01-25

## 2024-01-24 RX ORDER — LEVETIRACETAM 500 MG/1
500 TABLET ORAL EVERY 12 HOURS
Status: DISCONTINUED | OUTPATIENT
Start: 2024-01-24 | End: 2024-01-28 | Stop reason: HOSPADM

## 2024-01-24 RX ORDER — OXYCODONE HYDROCHLORIDE 5 MG/1
5 TABLET ORAL EVERY 6 HOURS PRN
Status: DISCONTINUED | OUTPATIENT
Start: 2024-01-24 | End: 2024-01-28 | Stop reason: HOSPADM

## 2024-01-24 RX ORDER — SODIUM CHLORIDE, SODIUM LACTATE, POTASSIUM CHLORIDE, CALCIUM CHLORIDE 600; 310; 30; 20 MG/100ML; MG/100ML; MG/100ML; MG/100ML
50 INJECTION, SOLUTION INTRAVENOUS CONTINUOUS
Status: ACTIVE | OUTPATIENT
Start: 2024-01-24 | End: 2024-01-24

## 2024-01-24 RX ORDER — ACETAMINOPHEN 325 MG/1
975 TABLET ORAL ONCE
Status: COMPLETED | OUTPATIENT
Start: 2024-01-24 | End: 2024-01-24

## 2024-01-24 RX ADMIN — PANTOPRAZOLE SODIUM 40 MG: 40 TABLET, DELAYED RELEASE ORAL at 06:33

## 2024-01-24 RX ADMIN — SODIUM CHLORIDE, POTASSIUM CHLORIDE, SODIUM LACTATE AND CALCIUM CHLORIDE 50 ML/HR: 600; 310; 30; 20 INJECTION, SOLUTION INTRAVENOUS at 08:38

## 2024-01-24 RX ADMIN — THIAMINE HCL TAB 100 MG 100 MG: 100 TAB at 08:39

## 2024-01-24 RX ADMIN — HEPARIN SODIUM 5000 UNITS: 5000 INJECTION INTRAVENOUS; SUBCUTANEOUS at 08:39

## 2024-01-24 RX ADMIN — VANCOMYCIN HYDROCHLORIDE 1500 MG: 5 INJECTION, POWDER, LYOPHILIZED, FOR SOLUTION INTRAVENOUS at 22:08

## 2024-01-24 RX ADMIN — INSULIN LISPRO 6 UNITS: 100 INJECTION, SOLUTION INTRAVENOUS; SUBCUTANEOUS at 08:39

## 2024-01-24 RX ADMIN — HEPARIN SODIUM 5000 UNITS: 5000 INJECTION INTRAVENOUS; SUBCUTANEOUS at 17:40

## 2024-01-24 RX ADMIN — ACETAMINOPHEN 975 MG: 325 TABLET ORAL at 08:51

## 2024-01-24 RX ADMIN — INSULIN LISPRO 6 UNITS: 100 INJECTION, SOLUTION INTRAVENOUS; SUBCUTANEOUS at 17:41

## 2024-01-24 RX ADMIN — INSULIN LISPRO 6 UNITS: 100 INJECTION, SOLUTION INTRAVENOUS; SUBCUTANEOUS at 12:18

## 2024-01-24 RX ADMIN — LEVETIRACETAM 500 MG: 5 INJECTION INTRAVENOUS at 00:20

## 2024-01-24 RX ADMIN — FOLIC ACID 1 MG: 1 TABLET ORAL at 08:39

## 2024-01-24 RX ADMIN — PREDNISONE 15 MG: 5 TABLET ORAL at 09:50

## 2024-01-24 RX ADMIN — HYDROCORTISONE SODIUM SUCCINATE 15 MG: 100 INJECTION, POWDER, FOR SOLUTION INTRAMUSCULAR; INTRAVENOUS at 06:32

## 2024-01-24 RX ADMIN — LEVETIRACETAM 500 MG: 500 TABLET, FILM COATED ORAL at 13:57

## 2024-01-24 RX ADMIN — OXYCODONE HYDROCHLORIDE 5 MG: 5 TABLET ORAL at 13:03

## 2024-01-24 ASSESSMENT — COGNITIVE AND FUNCTIONAL STATUS - GENERAL
DRESSING REGULAR UPPER BODY CLOTHING: A LOT
MOBILITY SCORE: 13
WALKING IN HOSPITAL ROOM: A LOT
HELP NEEDED FOR BATHING: A LOT
DAILY ACTIVITIY SCORE: 11
TURNING FROM BACK TO SIDE WHILE IN FLAT BAD: A LITTLE
WALKING IN HOSPITAL ROOM: A LOT
MOVING TO AND FROM BED TO CHAIR: A LOT
TOILETING: A LOT
TOILETING: A LOT
DRESSING REGULAR LOWER BODY CLOTHING: A LOT
MOVING FROM LYING ON BACK TO SITTING ON SIDE OF FLAT BED WITH BEDRAILS: A LITTLE
PERSONAL GROOMING: TOTAL
MOBILITY SCORE: 13
STANDING UP FROM CHAIR USING ARMS: A LOT
EATING MEALS: A LOT
EATING MEALS: A LOT
DAILY ACTIVITIY SCORE: 11
MOVING FROM LYING ON BACK TO SITTING ON SIDE OF FLAT BED WITH BEDRAILS: A LITTLE
STANDING UP FROM CHAIR USING ARMS: A LOT
CLIMB 3 TO 5 STEPS WITH RAILING: TOTAL
HELP NEEDED FOR BATHING: A LOT
DRESSING REGULAR LOWER BODY CLOTHING: A LOT
TURNING FROM BACK TO SIDE WHILE IN FLAT BAD: A LITTLE
MOVING TO AND FROM BED TO CHAIR: A LOT
CLIMB 3 TO 5 STEPS WITH RAILING: TOTAL
PERSONAL GROOMING: TOTAL
DRESSING REGULAR UPPER BODY CLOTHING: A LOT

## 2024-01-24 ASSESSMENT — PAIN SCALES - GENERAL
PAINLEVEL_OUTOF10: 4
PAINLEVEL_OUTOF10: 0 - NO PAIN
PAINLEVEL_OUTOF10: 0 - NO PAIN
PAINLEVEL_OUTOF10: 6

## 2024-01-24 ASSESSMENT — PAIN DESCRIPTION - LOCATION: LOCATION: HAND

## 2024-01-24 ASSESSMENT — PAIN SCALES - WONG BAKER: WONGBAKER_NUMERICALRESPONSE: NO HURT

## 2024-01-24 NOTE — NURSING NOTE
Blood sugar 423. Paged attending for orders. Order for 10u obtained and recheck blood sugar at 11pm

## 2024-01-24 NOTE — CARE PLAN
The patient's goals for the shift include Get up to chair for meals    The clinical goals for the shift include Patient to get a restful night of sleep and maintain safe environment

## 2024-01-24 NOTE — PROGRESS NOTES
"Bing Holliday is a 63 y.o. female on day 7 of admission presenting with Meningitis.    Subjective   NAEON. Aox3. Otherwise doing well and sitting up in chair on rounds.     Patient endorses worsening pain in R hand. Denies N/V/D, CP, SOB, fevers, chills.      Objective     Physical Exam  Constitutional:       General: She is not in acute distress.     Appearance: She is not ill-appearing or diaphoretic.   HENT:      Head: Normocephalic and atraumatic.      Nose: Nose normal.   Eyes:      Extraocular Movements: Extraocular movements intact.      Pupils: Pupils are equal, round, and reactive to light.   Cardiovascular:      Rate and Rhythm: Normal rate and regular rhythm.   Pulmonary:      Effort: Pulmonary effort is normal.      Breath sounds: Normal breath sounds.   Abdominal:      General: Abdomen is flat.      Palpations: Abdomen is soft.   Musculoskeletal:         General: Swelling (R hand) and tenderness (R hand) present.      Cervical back: Normal range of motion and neck supple.   Skin:     General: Skin is warm and dry.   Neurological:      Mental Status: She is alert.         Last Recorded Vitals  Blood pressure 128/67, pulse 62, temperature 36.6 °C (97.9 °F), resp. rate 18, height 1.727 m (5' 7.99\"), weight 99.3 kg (218 lb 14.7 oz), SpO2 98 %.  Intake/Output last 3 Shifts:  I/O last 3 completed shifts:  In: 1654.7 (16.7 mL/kg) [P.O.:898; I.V.:752.5 (7.6 mL/kg); Blood:4.2]  Out: 2350 (23.7 mL/kg) [Urine:2350 (0.7 mL/kg/hr)]  Dosing Weight: 99.3 kg     Relevant Results  Results for orders placed or performed during the hospital encounter of 01/17/24 (from the past 24 hour(s))   POCT GLUCOSE   Result Value Ref Range    POCT Glucose 225 (H) 74 - 99 mg/dL   POCT GLUCOSE   Result Value Ref Range    POCT Glucose 423 (H) 74 - 99 mg/dL   POCT GLUCOSE   Result Value Ref Range    POCT Glucose 277 (H) 74 - 99 mg/dL   Magnesium   Result Value Ref Range    Magnesium 1.76 1.60 - 2.40 mg/dL   Renal Function Panel "   Result Value Ref Range    Glucose 272 (H) 74 - 99 mg/dL    Sodium 141 136 - 145 mmol/L    Potassium 4.0 3.5 - 5.3 mmol/L    Chloride 105 98 - 107 mmol/L    Bicarbonate 27 21 - 32 mmol/L    Anion Gap 13 10 - 20 mmol/L    Urea Nitrogen 24 (H) 6 - 23 mg/dL    Creatinine 1.91 (H) 0.50 - 1.05 mg/dL    eGFR 29 (L) >60 mL/min/1.73m*2    Calcium 8.4 (L) 8.6 - 10.6 mg/dL    Phosphorus 2.8 2.5 - 4.9 mg/dL    Albumin 2.5 (L) 3.4 - 5.0 g/dL   CBC and Auto Differential   Result Value Ref Range    WBC 5.5 4.4 - 11.3 x10*3/uL    nRBC 2.4 (H) 0.0 - 0.0 /100 WBCs    RBC 3.09 (L) 4.00 - 5.20 x10*6/uL    Hemoglobin 9.0 (L) 12.0 - 16.0 g/dL    Hematocrit 31.3 (L) 36.0 - 46.0 %     (H) 80 - 100 fL    MCH 29.1 26.0 - 34.0 pg    MCHC 28.8 (L) 32.0 - 36.0 g/dL    RDW 19.2 (H) 11.5 - 14.5 %    Platelets 82 (L) 150 - 450 x10*3/uL    Neutrophils % 74.8 40.0 - 80.0 %    Immature Granulocytes %, Automated 3.5 (H) 0.0 - 0.9 %    Lymphocytes % 11.6 13.0 - 44.0 %    Monocytes % 9.5 2.0 - 10.0 %    Eosinophils % 0.2 0.0 - 6.0 %    Basophils % 0.4 0.0 - 2.0 %    Neutrophils Absolute 4.12 1.20 - 7.70 x10*3/uL    Immature Granulocytes Absolute, Automated 0.19 0.00 - 0.70 x10*3/uL    Lymphocytes Absolute 0.64 (L) 1.20 - 4.80 x10*3/uL    Monocytes Absolute 0.52 0.10 - 1.00 x10*3/uL    Eosinophils Absolute 0.01 0.00 - 0.70 x10*3/uL    Basophils Absolute 0.02 0.00 - 0.10 x10*3/uL   POCT GLUCOSE   Result Value Ref Range    POCT Glucose 251 (H) 74 - 99 mg/dL      Assessment/Plan   Principal Problem:    Meningitis  Active Problems:    Encephalopathy    Seizure (CMS/HCC)    Bing Holliday is a 63 y.o. 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 initially c/f adrenal crisis from sepsis, but now transferred for inpatient rheum consult iso  c/f lupus cerebritis vs meningitis vs adrenal insufficiency as etiology for encephalopathy. ID, endo, rheum and neuro consulted. LP completee 1/18. Imaging and labs work, inculding CSF studies unremarkable. Most likely cause of Ams at this point believed to be delirium s/t multiple admissions and seizures. Started on keppra by neuro. Mentation has continued to improve since admission, now pending dispo. On 1/22/23, R hand became warm and started swelling (measures 20 cm at wrist and 23 cm around dorsum of hand (1/24/24)). DVT U/S revealed partial occlusion of basilic vein no DVT. CT w/ poor views cannot r/o thrombophlebitis but will ctm .     Updates 1/24/24:  - 1/23/24 Blood Cx NGTD, pending  - Concerns for infection of R hand w/ possible septic thrombophlebitis given swelling and basilic vein. Ordered CT w Con of RUE, pending. CT inconclusive. Will continue to clinically monitor hand and size.  - Initiated vancomycin renally dosed by pharmacy  - PT/Ot rec' SNF, will f/u with son regarding dispo  - Per Endo; We are suggesting to switch to PO prednisone 15 mg daily for 2 weeks , then to taper it to 12.5 mg daily for 2 weeks and then to 10 mg daily. Will reach out to rheumatology to confirm taper. Switched to PO pred 15 mg 1/24/24. Rheum recs 15 mg until follow up at outpt appointment.   - Need to resume DOAC prior to dispo     #Encephalopathy  :: per daughter, worsening confusion over past year with several admissions since November for AMS (hypoglycemia, UTI, adrenal crisis s/t norovirus)  -1/15 MRI Brain w and w/o contrast demonstrated focal encephalomalacia in L occipital region unchanged from previous exam. Hemosiderin deposit in L occipital region consistent w/ remote infarction/contusion. Scattered foci w/ increased signal in subcortical and periventricular white matter consistent w/ demyelination (new finding).    -Per neuro periventricular/juxtacortical white matter changes on FLAIR imaging similar to  imaging from 03/2023. These changes less suggestive of lupus cerebritis and more suggestive of chronic age related white matter changes (as minimal to no white matter changes were seen on MRI from 2005)  -vEEG w/o epileptiform activity   -TSH, B12, folate wnl  -anti-dsDNA Ab negative, anti-ribosomal Ab negative  -KATHI panel w/ positive anti-SM/RNP and anti-chromatin ab positive,  C3/C4 wnl  -LP completed 1/18  -CSF glucose, protein normal, wbc 4, Crypto antigen negative, HSV pcr negative, cultures, meningitis/encephalitis panels negative, anti-neuronal Ab negative  -anti-ribosomal P ab, oligoclonal bands, IgG index, Cryptococcal Ab pending  -per rheum, normal rheumatologic work-up goes against active SLE (lupus cerebritis)  -per neuro, less likely lupus cerebritis, likely delirium given recurrent admissions and seizures  -per ID, unlikely infectious, stop empiric meningitis tx and IV acyclovir  -S/p keppra load, avitan at OSH, keppra continued 500 BID per neuro  - Ativan 2 mg IV for prolonged motor seizure lasting more than 3 minutes or a cluster of 3 or more motor seizures in an 8 hour period.     #SLE  - labs as above  - Holding MMF (1/16) in setting of presumed infection  - Continue on stress dose steroids as below  - Rheumatology is following. Further recommendations based on results of LP    #R hand edema  #Thrombophlebitis  ::On 1/22/23, R hand became warm and started swelling. DVT U/S revealed partial occlusion of basilic vein no DVT.   - Concerns for infection of R hand w/ possible septic thrombophlebitis given swelling and basilic vein.   - DVTU/S revealed parital osclusive superficial thrombus within basiclic vein but no DVT. CT revealed edema but otherwise chronic changes, no discrete fluid collection but otherwise difficult read. No definitive septic thrombophlebitis but need to correlate clinically.  Ordered CT w Con of RUE, pending.  - Initiated vancomycin renally dosed by  pharmacy    #Hypernatremia  -Na up to 152  -likely due to NPO status  -adjust FWFs to Na  -Daily RFP     #c diff, recurrent episode?  (tx'ed w/PO vanc pulse dose in 3/2023 with taper, metronidazole 2/2023)  - as per chart review, has chronic diarrhea with IBS-type picture?  - diarrhea with positive PCR positive, EIA negative 1/18  - PCR was positive 5/2022, 10/2022, 2/2023. 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  -endocrine following, low concern for adrenal crisis  -on chronic prednisone (10mg + 5mg)   -started stress dose hydrocortisone 25mg q6hrs (265fvx5 on 1/14) in light of presumed infection, decreased to 15mg q6h 1/20  -touch base about home going steroid plan     #Paroxysmal Atrial fibrillation on eliquis  #Intermittent sinus bradycardia  -Chadsvasc 4  -DOAC held 1/15 in anticipation of LP, started SQH for dvt ppx  -Resume DOAC prior to DC  -continue telemetry     #Chronic Pancytopenia  - likely s/t lupus  - Retic count 0.02 (LLN 0.018), TIBC low, % sat low, ferritin elevated 1/16  - Has required 2 units of packed red blood cells on 1/16 1/17  - Hemoglobin goal greater than 7, platelets greater than 10, if bleeding platelets greater than 50.     #JED on CKD 3, resolved  - Bl ~ 1.6 -1.8, Cr trend 2.24 -> 1.28  - Improved with fluids     #CAD s/p CABG 2013  -statin    Dispo: PT/OT rec' SNF, pending discussion with son     F: Replete PRN  E: Replete PRN  N: TFs via DHT  DVT Ppx: SQH  GI Ppx: Esomeprazole  Access/Lines: 2 PIV, midline 1/16/ [place] 1/16     Code status: DNR and No Intubation  NOK: López Lang (son) [phone] (Home Phone)         Kirk Crevantes MD PhD  Internal Medicine, PGY1

## 2024-01-24 NOTE — CARE PLAN
The patient's goals for the shift include Up to the chair for meals throughout the shift    The clinical goals for the shift include Get imaging results and communicate them to patient    Over the shift, the patient did not make progress toward the following goals. Barriers to progression include   . Recommendations to address these barriers include   .

## 2024-01-24 NOTE — CARE PLAN
The patient's goals for the shift include Get up to chair for meals    The clinical goals for the shift include Patient to get a restful night of sleep and maintain safe environment    Over the shift, the patient did not make progress toward the following goals. Barriers to progression include   . Recommendations to address these barriers include   .

## 2024-01-25 LAB
ATRIAL RATE: 62 BPM
ATRIAL RATE: 65 BPM
ATRIAL RATE: 69 BPM
GLUCOSE BLD MANUAL STRIP-MCNC: 252 MG/DL (ref 74–99)
GLUCOSE BLD MANUAL STRIP-MCNC: 287 MG/DL (ref 74–99)
GLUCOSE BLD MANUAL STRIP-MCNC: 329 MG/DL (ref 74–99)
GLUCOSE BLD MANUAL STRIP-MCNC: 385 MG/DL (ref 74–99)
P AXIS: 38 DEGREES
P AXIS: 64 DEGREES
P AXIS: 83 DEGREES
P OFFSET: 189 MS
P OFFSET: 192 MS
P OFFSET: 195 MS
P ONSET: 132 MS
P ONSET: 139 MS
P ONSET: 142 MS
PR INTERVAL: 142 MS
PR INTERVAL: 150 MS
PR INTERVAL: 164 MS
Q ONSET: 210 MS
Q ONSET: 214 MS
Q ONSET: 217 MS
QRS COUNT: 10 BEATS
QRS COUNT: 11 BEATS
QRS COUNT: 11 BEATS
QRS DURATION: 102 MS
QRS DURATION: 106 MS
QRS DURATION: 98 MS
QT INTERVAL: 408 MS
QT INTERVAL: 408 MS
QT INTERVAL: 410 MS
QTC CALCULATION(BAZETT): 414 MS
QTC CALCULATION(BAZETT): 424 MS
QTC CALCULATION(BAZETT): 442 MS
QTC FREDERICIA: 412 MS
QTC FREDERICIA: 418 MS
QTC FREDERICIA: 431 MS
R AXIS: -17 DEGREES
R AXIS: -17 DEGREES
R AXIS: -18 DEGREES
T AXIS: -38 DEGREES
T AXIS: 50 DEGREES
T AXIS: 8 DEGREES
T OFFSET: 415 MS
T OFFSET: 418 MS
T OFFSET: 421 MS
VANCOMYCIN TROUGH SERPL-MCNC: 31 UG/ML (ref 5–20)
VENTRICULAR RATE: 62 BPM
VENTRICULAR RATE: 65 BPM
VENTRICULAR RATE: 70 BPM

## 2024-01-25 PROCEDURE — 2500000004 HC RX 250 GENERAL PHARMACY W/ HCPCS (ALT 636 FOR OP/ED)

## 2024-01-25 PROCEDURE — 80202 ASSAY OF VANCOMYCIN: CPT

## 2024-01-25 PROCEDURE — 2500000001 HC RX 250 WO HCPCS SELF ADMINISTERED DRUGS (ALT 637 FOR MEDICARE OP)

## 2024-01-25 PROCEDURE — 1200000002 HC GENERAL ROOM WITH TELEMETRY DAILY

## 2024-01-25 PROCEDURE — 99232 SBSQ HOSP IP/OBS MODERATE 35: CPT | Performed by: INTERNAL MEDICINE

## 2024-01-25 PROCEDURE — 82947 ASSAY GLUCOSE BLOOD QUANT: CPT

## 2024-01-25 RX ADMIN — ACETAMINOPHEN 975 MG: 325 TABLET ORAL at 04:04

## 2024-01-25 RX ADMIN — INSULIN LISPRO 6 UNITS: 100 INJECTION, SOLUTION INTRAVENOUS; SUBCUTANEOUS at 08:26

## 2024-01-25 RX ADMIN — HEPARIN SODIUM 5000 UNITS: 5000 INJECTION INTRAVENOUS; SUBCUTANEOUS at 18:01

## 2024-01-25 RX ADMIN — INSULIN LISPRO 10 UNITS: 100 INJECTION, SOLUTION INTRAVENOUS; SUBCUTANEOUS at 18:01

## 2024-01-25 RX ADMIN — HEPARIN SODIUM 5000 UNITS: 5000 INJECTION INTRAVENOUS; SUBCUTANEOUS at 08:18

## 2024-01-25 RX ADMIN — PANTOPRAZOLE SODIUM 40 MG: 40 TABLET, DELAYED RELEASE ORAL at 05:20

## 2024-01-25 RX ADMIN — INSULIN LISPRO 6 UNITS: 100 INJECTION, SOLUTION INTRAVENOUS; SUBCUTANEOUS at 12:42

## 2024-01-25 RX ADMIN — HEPARIN SODIUM 5000 UNITS: 5000 INJECTION INTRAVENOUS; SUBCUTANEOUS at 00:06

## 2024-01-25 RX ADMIN — LEVETIRACETAM 500 MG: 500 TABLET, FILM COATED ORAL at 12:42

## 2024-01-25 RX ADMIN — FOLIC ACID 1 MG: 1 TABLET ORAL at 08:18

## 2024-01-25 RX ADMIN — LEVETIRACETAM 500 MG: 500 TABLET, FILM COATED ORAL at 00:45

## 2024-01-25 RX ADMIN — PREDNISONE 15 MG: 5 TABLET ORAL at 08:18

## 2024-01-25 RX ADMIN — THIAMINE HCL TAB 100 MG 100 MG: 100 TAB at 08:18

## 2024-01-25 RX ADMIN — ACETAMINOPHEN 975 MG: 325 TABLET ORAL at 21:34

## 2024-01-25 ASSESSMENT — COGNITIVE AND FUNCTIONAL STATUS - GENERAL
MOBILITY SCORE: 13
EATING MEALS: A LITTLE
STANDING UP FROM CHAIR USING ARMS: A LOT
DRESSING REGULAR LOWER BODY CLOTHING: A LOT
EATING MEALS: A LOT
HELP NEEDED FOR BATHING: A LOT
PERSONAL GROOMING: TOTAL
WALKING IN HOSPITAL ROOM: A LOT
PERSONAL GROOMING: TOTAL
HELP NEEDED FOR BATHING: A LOT
DAILY ACTIVITIY SCORE: 11
TURNING FROM BACK TO SIDE WHILE IN FLAT BAD: A LITTLE
DRESSING REGULAR UPPER BODY CLOTHING: A LOT
MOVING TO AND FROM BED TO CHAIR: A LOT
STANDING UP FROM CHAIR USING ARMS: A LOT
CLIMB 3 TO 5 STEPS WITH RAILING: TOTAL
TOILETING: A LOT
WALKING IN HOSPITAL ROOM: A LOT
DAILY ACTIVITIY SCORE: 12
MOVING TO AND FROM BED TO CHAIR: A LOT
DRESSING REGULAR LOWER BODY CLOTHING: A LOT
DRESSING REGULAR UPPER BODY CLOTHING: A LOT
TURNING FROM BACK TO SIDE WHILE IN FLAT BAD: A LITTLE
MOVING FROM LYING ON BACK TO SITTING ON SIDE OF FLAT BED WITH BEDRAILS: A LITTLE
TOILETING: A LOT
MOVING FROM LYING ON BACK TO SITTING ON SIDE OF FLAT BED WITH BEDRAILS: A LITTLE

## 2024-01-25 ASSESSMENT — PAIN SCALES - GENERAL
PAINLEVEL_OUTOF10: 0 - NO PAIN
PAINLEVEL_OUTOF10: 4
PAINLEVEL_OUTOF10: 0 - NO PAIN

## 2024-01-25 ASSESSMENT — PAIN SCALES - WONG BAKER: WONGBAKER_NUMERICALRESPONSE: NO HURT

## 2024-01-25 NOTE — PROGRESS NOTES
"Vancomycin Dosing by Pharmacy- FOLLOW UP    Bing Holliday is a 63 y.o. year old female who Pharmacy has been consulted for vancomycin dosing for cellulitis, skin and soft tissue. Based on the patient's indication and renal status this patient is being dosed based on a goal trough/random level of 10-15.     Renal function is currently declining.    Current vancomycin dose: 1500 mg given once    Most recent trough level: 11.7 mcg/mL    Visit Vitals  /67   Pulse 66   Temp 36.6 °C (97.9 °F)   Resp 18        Lab Results   Component Value Date    CREATININE 1.91 (H) 01/24/2024    CREATININE 1.93 (H) 01/23/2024    CREATININE 1.71 (H) 01/22/2024    CREATININE 1.59 (H) 01/21/2024        Patient weight is No results found for: \"PTWEIGHT\"    No results found for: \"CULTURE\"     I/O last 3 completed shifts:  In: 1654.7 (16.7 mL/kg) [P.O.:898; I.V.:752.5 (7.6 mL/kg); Blood:4.2]  Out: 1550 (15.6 mL/kg) [Urine:1550 (0.4 mL/kg/hr)]  Dosing Weight: 99.3 kg   [unfilled]    Lab Results   Component Value Date    PATIENTTEMP 37.0 01/18/2024    PATIENTTEMP 37.0 11/13/2022        Assessment/Plan    Within goal random/trough level. We will continue to give another 1500mg one time dose.  The next level will be obtained on 1/25 in the PM, 24H after the dose is given. May be obtained sooner if clinically indicated.   Will continue to monitor renal function daily while on vancomycin and order serum creatinine at least every 48 hours if not already ordered.  Follow for continued vancomycin needs, clinical response, and signs/symptoms of toxicity.       Chiqui Schuster, PharmD           "

## 2024-01-25 NOTE — PROGRESS NOTES
"Nutrition Follow Up Assessment:   Nutrition Assessment    Reason for Assessment:  (Nutrition follow up)    Patient is a 63 y.o. female presenting with: Meningitis.     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 difficile  (2/2023 ), history of norovirus (detected 11/23), recent diagnosis of secondary adrenal insufficiency? (With prednisone 10 mg +5 mg daily),  presented to [facility] on 1/14 with progressive weakness, confusion, diarrhea , back pain, initially admitted for management of secondary adrenal insufficiency, developed seizure-like activity, started on antiepileptic medications, Transferred on 1/17 to [facility] MICU to rule out meningitis (LP), continuous video EEG, and rheumatology consult.     Nutrition History:  Food and Nutrient History: Patient passed SLP eval for Regular diet and thin liquids.  She was sitting up in bed upon visit this afternoon.  Reports pretty good appetite and intake.  Eating 3 meals per day including a variety of foods.  Eats limited dairy. Based on meal intake records consumed 25-75% of recent meals. Patient does not care much for Ensure type supplements as they bother her stomach but is willing to try Pro-Stat to aid in adequate protein intake/wound healing.    Anthropometrics:  Height: 172.7 cm (5' 7.99\")   Weight: 99.3 kg (218 lb 14.7 oz)   BMI (Calculated): 33.29  IBW/kg (Dietitian Calculated): 63.6 kg  Percent of IBW: 156 %       Weight History:   Wt Readings from Last 14 Encounters:   01/20/24 99.3 kg (218 lb 14.7 oz)   01/17/24 96 kg (211 lb 10.3 oz)   12/26/23 93 kg (205 lb)   12/18/23 93.4 kg (205 lb 14.6 oz)   11/30/23 105 kg (231 lb)   11/20/23 94.8 kg " (208 lb 15.9 oz)   10/19/23 94.6 kg (208 lb 8.9 oz)   09/14/23 94.3 kg (207 lb 12.8 oz)   08/01/23 95.3 kg (210 lb)   07/07/23 98.4 kg (217 lb)   05/20/23 90.7 kg (199 lb 15.4 oz)   04/13/23 89.8 kg (198 lb)   04/12/23 90.7 kg (200 lb)   12/29/22 91.6 kg (202 lb)      Nutrition Focused Physical Exam Findings:  Subcutaneous Fat Loss:   Orbital Fat Pads: Mild-Moderate (slight dark circles and slight hollowing)  Buccal Fat Pads: Mild-Moderate (flat cheeks, minimal bounce)  Triceps: Well nourished (ample fat tissue)  Ribs: Defer  Muscle Wasting:  Temporalis: Mild-Moderate (slight depression)  Pectoralis (Clavicular Region): Mild-Moderate (some protrusion of clavicle)  Deltoid/Trapezius: Mild-Moderate (slight protrusion of acromion process)  Interosseous: Defer (contracted)  Edema:  Edema: +1 trace (RUE)  Physical Findings:  Skin: Positive (L breast wound, pressure injury buttocks, L foot DM ulcer)    Nutrition Significant Labs:  Lab Results   Component Value Date    GLUCOSE 272 (H) 01/24/2024    CALCIUM 8.4 (L) 01/24/2024     01/24/2024    K 4.0 01/24/2024    CO2 27 01/24/2024     01/24/2024    BUN 24 (H) 01/24/2024    CREATININE 1.91 (H) 01/24/2024      Lab Results   Component Value Date    CALCIUM 8.4 (L) 01/24/2024    PHOS 2.8 01/24/2024      Lab Results   Component Value Date    WBC 5.5 01/24/2024    HGB 9.0 (L) 01/24/2024    HCT 31.3 (L) 01/24/2024     (H) 01/24/2024    PLT 82 (L) 01/24/2024        Nutrition Specific Medications:  Scheduled medications  pantoprazole, 40 mg, oral, Daily before breakfast   Or  esomeprazole, 40 mg, nasoduodenal tube, Daily before breakfast   Or  pantoprazole, 40 mg, intravenous, Daily before breakfast  folic acid, 1 mg, oral, Daily  heparin (porcine), 5,000 Units, subcutaneous, q8h  insulin lispro, 0-10 Units, subcutaneous, TID with meals  levETIRAcetam, 500 mg, oral, q12h  predniSONE, 15 mg, oral, Daily  thiamine, 100 mg, nasogastric tube, Daily         I/O:   Last  BM Date: 01/24/24; Stool Appearance: Loose (01/22/24 1945)        Dietary Orders (From admission, onward)       Start     Ordered    01/20/24 1224  Adult diet Regular; Thin 0; 1:1 Feeding  Diet effective now        Question Answer Comment   Diet type Regular    Fluid consistency Thin 0    Select tray type: 1:1 Feeding        01/20/24 1235                     Estimated Needs:   Total Energy Estimated Needs (kCal): 1900 kCal    Total Protein Estimated Needs (g): 75 g                Nutrition Diagnosis        Nutrition Diagnosis  Diagnosis Status (1): Resolved  Nutrition Diagnosis 1: Swallowing difficulity  Related to (1): poor mental status/weakness  As Evidenced by (1): need for dobhoff for meds and nutrition  Additional Nutrition Diagnosis: Diagnosis 2  Diagnosis Status (2): New  Nutrition Diagnosis 2: Increased nutrient needs  Related to (2): increased metabolic demand  As Evidenced by (2): need for healing/recovery       Nutrition Interventions/Recommendations         Nutrition Prescription:  Individualized Nutrition Prescription Provided for : 1) Recommend continue Regular diet as tolerated.        Nutrition Interventions:   Food and/or Nutrient Delivery Interventions  Interventions: Medical food supplement  Goal: Pro-stat daily (100kcal, 17g protein)         Nutrition Education:   Discussed adequate intake, high protein sources to include with meals, general healthful nutrition to promote healing/recovery.        Nutrition Monitoring and Evaluation   Food/Nutrient Related History Monitoring  Monitoring and Evaluation Plan: Energy intake    Body Composition/Growth/Weight History  Monitoring and Evaluation Plan: Weight         Nutrition Focused Physical Findings  Monitoring and Evaluation Plan: Skin       Time Spent/Follow-up Reminder:   Time Spent (min): 60 minutes  Last Date of Nutrition Visit: 01/25/24  Nutrition Follow-Up Needed?: Dietitian to reassess per policy

## 2024-01-25 NOTE — PROGRESS NOTES
Bing Holliday is a 63 y.o. female on day 8 of admission presenting with Meningitis.    Subjective   Received update from [facility] that they received authorization for patient to admit for skilled; auth good through 1/27. Primary team was updated and advised that ADOD is 1 week per attending. Facility updated in [facility].     SW will continue to follow.    UPDATE 1605 Received update from senior resident that patients ADOD is tomorrow. Updated [facility] in [facility] who stated that they will not have any bed availability until next week. Patients current authorization expires on Saturday.     Call placed to patients norberto Dawn to further discuss; states that he will review with his mom for other choices as [facility] is the only facility they are familiar with.     SW will continue to follow.    -Gogo BENSON MA, LSW  [phone] or [facility] Secure Chat  Care Transitions

## 2024-01-25 NOTE — CARE PLAN
Problem: Pain  Goal: My pain/discomfort is manageable  Outcome: Progressing   The patient's goals for the shift include Up to the chair for meals throughout the shift    The clinical goals for the shift include progressing

## 2024-01-25 NOTE — PROGRESS NOTES
"Bing Holliday is a 63 y.o. female on day 8 of admission presenting with Meningitis.    Subjective      I have reviewed histories, allergies and medications have been reviewed and there are no changes     Last Recorded Vitals  Blood pressure 152/69, pulse 60, temperature 36.5 °C (97.7 °F), resp. rate 18, height 1.727 m (5' 7.99\"), weight 99.3 kg (218 lb 14.7 oz), SpO2 91 %.  Intake/Output last 3 Shifts:  I/O last 3 completed shifts:  In: 1556.7 (15.7 mL/kg) [P.O.:300; I.V.:752.5 (7.6 mL/kg); Blood:4.2; IV Piggyback:500]  Out: 1750 (17.6 mL/kg) [Urine:1750 (0.5 mL/kg/hr)]  Dosing Weight: 99.3 kg     Relevant Results  Results from last 7 days   Lab Units 01/25/24  0816 01/24/24 2020 01/24/24  1636 01/24/24  1138 01/24/24  0834 01/24/24  0757 01/23/24  0729 01/23/24  0448 01/22/24  0901 01/22/24  0445 01/21/24  0445 01/21/24  0440 01/20/24  2059 01/20/24  1836   POCT GLUCOSE mg/dL 287* 352* 288* 251*  --  277*   < >  --    < >  --    < >  --    < >  --    GLUCOSE mg/dL  --   --   --   --  272*  --   --  334*  --  224*  --  247*  --  144*    < > = values in this interval not displayed.         Lab Results   Component Value Date    ANIONGAP 13 01/24/2024    BUN 24 (H) 01/24/2024    CO2 27 01/24/2024    CREATININE 1.91 (H) 01/24/2024    K 4.0 01/24/2024     01/24/2024     01/24/2024    PHOS 2.8 01/24/2024    GLUCOSE 272 (H) 01/24/2024    CALCIUM 8.4 (L) 01/24/2024    EGFR 29 (L) 01/24/2024    ALBUMIN 2.5 (L) 01/24/2024    CAION 1.21 01/17/2024    PTH 44.1 04/15/2021    VITD25 34 12/10/2022      Lab Results   Component Value Date    TSH 0.55 01/16/2024    FREET4 0.86 10/19/2023      Lab Results   Component Value Date    HGBA1C 6.9 (A) 07/12/2023    HGBA1C 6.3 (A) 03/22/2023    HGBA1C 6.1 (A) 01/25/2023     01/24/2024    K 4.0 01/24/2024     01/24/2024    CO2 27 01/24/2024    BUN 24 (H) 01/24/2024    CREATININE 1.91 (H) 01/24/2024    CALCIUM 8.4 (L) 01/24/2024    ALBUMIN 2.5 (L) 01/24/2024    PROT " 4.0 (L) 01/21/2024    BILITOT 0.3 01/21/2024    ALKPHOS 93 01/21/2024    ALT 19 01/21/2024    AST 14 01/21/2024    GLUCOSE 272 (H) 01/24/2024    CHOL 160 03/24/2023    TRIG 79 03/24/2023    HDL 70.9 03/24/2023    BHYDRXBUT 0.07 10/23/2023    CPEPTIDE 3.0 10/23/2023    INSAB <0.4 11/21/2023        Current Facility-Administered Medications   Medication Dose Route Frequency Provider Last Rate Last Admin    acetaminophen (Tylenol) tablet 975 mg  975 mg oral TID PRN Kirk Cervantes MD   975 mg at 01/25/24 0404    dextrose 10 % in water (D10W) infusion  0.3 g/kg/hr intravenous Once PRN Salma Baker MD        dextrose 50 % injection 25 g  25 g intravenous q15 min PRN Salma Baker MD        pantoprazole (ProtoNix) EC tablet 40 mg  40 mg oral Daily before breakfast Jeniffer Vasquez MD   40 mg at 01/25/24 0520    Or    esomeprazole (NexIUM) suspension 40 mg  40 mg nasoduodenal tube Daily before breakfast Jeniffer Vasquez MD   40 mg at 01/21/24 0550    Or    pantoprazole (ProtoNix) injection 40 mg  40 mg intravenous Daily before breakfast Jeniffer Vasquez MD   40 mg at 01/20/24 0538    folic acid (Folvite) tablet 1 mg  1 mg oral Daily Jeniffer Vasquez MD   1 mg at 01/25/24 0818    glucagon (Glucagen) injection 1 mg  1 mg intramuscular q15 min PRN Salma Baker MD        heparin (porcine) injection 5,000 Units  5,000 Units subcutaneous q8h Jeniffer Vasquez MD   5,000 Units at 01/25/24 0818    insulin lispro (HumaLOG) injection 0-10 Units  0-10 Units subcutaneous TID with meals Salma Baker MD   6 Units at 01/25/24 0826    levETIRAcetam (Keppra) tablet 500 mg  500 mg oral q12h Kirk Cervantes MD   500 mg at 01/25/24 0045    loperamide (Imodium) capsule 2 mg  2 mg oral 4x daily PRN Jeniffer Vasquez MD   2 mg at 01/21/24 1829    LORazepam (Ativan) injection 2 mg  2 mg intravenous Once PRN Jeniffer Vasquez MD        LORazepam (Ativan) injection 2 mg  2 mg intravenous Once PRN Nadiya Hernandez MD         oxyCODONE (Roxicodone) immediate release tablet 5 mg  5 mg oral q6h PRN Kirk Cervantes MD   5 mg at 01/24/24 1303    predniSONE (Deltasone) tablet 15 mg  15 mg oral Daily Kirk Cervantes MD   15 mg at 01/25/24 0818    thiamine (Vitamin B-1) tablet 100 mg  100 mg nasogastric tube Daily Jeniffer Vasquez MD   100 mg at 01/25/24 0818          Assessment/Plan   Principal Problem:    Meningitis  Active Problems:    Encephalopathy    Seizure (CMS/HCC)    Bing Holliday is a 63 years old patient with PMH of type 2 diabetes (last A1c 6.9% in 7/23 , steroid-induced), CKD Stage 3 (baseline Cr ~1.9) , 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 difficile  (2/2023 ), history of norovirus (detected 11/23), recent diagnosis of secondary adrenal insufficiency? (With prednisone 10 mg +5 mg daily),  presented to [facility] on 1/14 with progressive weakness, confusion, diarrhea , back pain, initially admitted for management of secondary adrenal insufficiency, developed seizure-like activity, started on antiepileptic medications, Transferred on 1/17 to [facility] MICU to rule out meningitis (LP), continuous video EEG, and rheumatology consult.     Endocrinology consulted for management of adrenal insufficiency and steroid dosing.     Patient has secondary adrenal insufficiency, less likely develop adrenal crisis as a possible explanation for the current presentation.     Update: 1/22/24  - Imaging and labs work, inculding CSF studies unremarkable. Most likely cause of AMS at this point believed to be delirium secondary to multiple admissions and seizures.   - SLP evaluated, DHT tube pulled as patient tolerated PO well.    - per rheum, normal rheumatologic work-up goes against active SLE (lupus cerebritis)  - per neuro, less likely  lupus cerebritis, likely delirium given recurrent admissions and seizures  - per ID, unlikely infectious, stop empiric meningitis tx and IV acyclovir    Initially, was taking IV HC 25 mg Q6 hourly, dose was reduced to 15 mg Q6 hourly on Friday.  On 1/22/23 - pt. off isolation ,was looking improved.  We suggested to switch to PO prednisone 15 mg daily ,started on 1/24/24.      Update 1/25/24:  - As per rheumatology recs , PO Prednisone 15 mg daily until pt. Will see her rheumatologist.  - Pt. Can be discharged on PO Prednisone 15 mg daily , we are not suggesting further taper down , it should be evaluated / determined by her rheumatologist.   - No need for any testing regarding the HPA axis for now,  as patient has known AI secondary to exogenous chronic glucocorticoid use.   Later on , if pt. Is off steroids and there is any concern for AI, she can be evaluated for AI.   - Endocrine service will sign off today, please do not hesitate to contact us in case of any endocrine query related to this pt.      Case discussed with Dr. Calix.        Florencia Lyman MD

## 2024-01-25 NOTE — PROGRESS NOTES
"Bing Holliday is a 63 y.o. female on day 8 of admission presenting with Meningitis.    Subjective   NAEON. Aox3. Otherwise doing well but still endorse R hand pain.     Denies N/V/D, CP, SOB, fevers, chills other s/s of systemic infection. Endorses tolerating PO.      Objective     Physical Exam  Constitutional:       General: She is not in acute distress.     Appearance: She is not ill-appearing or diaphoretic.   HENT:      Head: Normocephalic and atraumatic.      Nose: Nose normal.   Eyes:      Extraocular Movements: Extraocular movements intact.      Pupils: Pupils are equal, round, and reactive to light.   Cardiovascular:      Rate and Rhythm: Normal rate and regular rhythm.   Pulmonary:      Effort: Pulmonary effort is normal.      Breath sounds: Normal breath sounds.   Abdominal:      General: Abdomen is flat.      Palpations: Abdomen is soft.   Musculoskeletal:         General: Swelling (R hand) and tenderness (R hand) present.      Cervical back: Normal range of motion and neck supple.   Skin:     General: Skin is warm and dry.   Neurological:      Mental Status: She is alert.         Last Recorded Vitals  Blood pressure 169/86, pulse 68, temperature 36.7 °C (98.1 °F), temperature source Temporal, resp. rate 18, height 1.727 m (5' 7.99\"), weight 99.3 kg (218 lb 14.7 oz), SpO2 91 %.  Intake/Output last 3 Shifts:  I/O last 3 completed shifts:  In: 1556.7 (15.7 mL/kg) [P.O.:300; I.V.:752.5 (7.6 mL/kg); Blood:4.2; IV Piggyback:500]  Out: 1750 (17.6 mL/kg) [Urine:1750 (0.5 mL/kg/hr)]  Dosing Weight: 99.3 kg     Relevant Results  Results for orders placed or performed during the hospital encounter of 01/17/24 (from the past 24 hour(s))   POCT GLUCOSE   Result Value Ref Range    POCT Glucose 288 (H) 74 - 99 mg/dL   Vancomycin   Result Value Ref Range    Vancomycin 11.7 5.0 - 20.0 ug/mL   POCT GLUCOSE   Result Value Ref Range    POCT Glucose 352 (H) 74 - 99 mg/dL   POCT GLUCOSE   Result Value Ref Range    POCT " Glucose 287 (H) 74 - 99 mg/dL   POCT GLUCOSE   Result Value Ref Range    POCT Glucose 252 (H) 74 - 99 mg/dL      Assessment/Plan   Principal Problem:    Meningitis  Active Problems:    Encephalopathy    Seizure (CMS/HCC)    Bing Holliday is a 63 y.o. 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 initially c/f adrenal crisis from sepsis, but now transferred for inpatient rheum consult iso c/f lupus cerebritis vs meningitis vs adrenal insufficiency as etiology for encephalopathy. ID, endo, rheum and neuro consulted. LP completee 1/18. Imaging and labs work, inculding CSF studies unremarkable. Most likely cause of Ams at this point believed to be delirium s/t multiple admissions and seizures. Started on keppra by neuro. Mentation has continued to improve since admission, now pending dispo. On 1/22/23, R hand became warm and started swelling (measures 20 cm at wrist and 23 cm around dorsum of hand (1/24/24)). DVT U/S revealed partial occlusion of basilic vein no DVT. CT w/ poor views cannot r/o thrombophlebitis but will ctm .     Updates 1/25/24:  - 1/23/24 Blood Cx NGTD, pending  - Concerns for infection of R hand w/ possible septic thrombophlebitis given swelling and basilic vein. Ordered CT w Con of RUE, pending. CT inconclusive. Will continue to clinically monitor hand and size.  - Initiated vancomycin renally dosed by pharmacy, will likely transition to PO doxycycline on dispo  - PT/Ot rec' SNF, will f/u with son regarding dispo.   - Per Endo and Rheum;  Switched to PO pred 15 mg 1/24/24. Rheum recs 15 mg until follow up at outpt appointment.   - Need to resume DOAC prior to dispo (2.b mg apixaban BID)     #Encephalopathy  :: per daughter, worsening confusion over past year with several admissions since  November for AMS (hypoglycemia, UTI, adrenal crisis s/t norovirus)  -1/15 MRI Brain w and w/o contrast demonstrated focal encephalomalacia in L occipital region unchanged from previous exam. Hemosiderin deposit in L occipital region consistent w/ remote infarction/contusion. Scattered foci w/ increased signal in subcortical and periventricular white matter consistent w/ demyelination (new finding).    -Per neuro periventricular/juxtacortical white matter changes on FLAIR imaging similar to imaging from 03/2023. These changes less suggestive of lupus cerebritis and more suggestive of chronic age related white matter changes (as minimal to no white matter changes were seen on MRI from 2005)  -vEEG w/o epileptiform activity   -TSH, B12, folate wnl  -anti-dsDNA Ab negative, anti-ribosomal Ab negative  -KATHI panel w/ positive anti-SM/RNP and anti-chromatin ab positive,  C3/C4 wnl  -LP completed 1/18  -CSF glucose, protein normal, wbc 4, Crypto antigen negative, HSV pcr negative, cultures, meningitis/encephalitis panels negative, anti-neuronal Ab negative  -anti-ribosomal P ab, oligoclonal bands, IgG index, Cryptococcal Ab pending  -per rheum, normal rheumatologic work-up goes against active SLE (lupus cerebritis)  -per neuro, less likely lupus cerebritis, likely delirium given recurrent admissions and seizures  -per ID, unlikely infectious, stop empiric meningitis tx and IV acyclovir  -S/p keppra load, avitan at OSH, keppra continued 500 BID per neuro  - Ativan 2 mg IV for prolonged motor seizure lasting more than 3 minutes or a cluster of 3 or more motor seizures in an 8 hour period.     #SLE  - labs as above  - Holding MMF (1/16) in setting of presumed infection  - Rheumatology and endocrine following, we appreciate recs.  - Pulse steroid ended, 1/24/24  starting home PO pred 10 + 5 mg until seen w/ rheumatology as outpt    #R hand edema  #Thrombophlebitis  :: On 1/22/23, R hand became warm and started swelling. DVT U/S  revealed partial occlusion of basilic vein no DVT.   - Concerns for infection of R hand w/ possible septic thrombophlebitis given swelling and basilic vein.   - DVT U/S revealed parital osclusive superficial thrombus within basiclic vein but no DVT. CT revealed edema but otherwise chronic changes, no discrete fluid collection but otherwise difficult read. No definitive septic thrombophlebitis but need to correlate clinically.  Ordered CT w Con of RUE, pending.  - Initiated vancomycin renally dosed by pharmacy  - Measurements circumferential 20 cm at wrist 23 cm around hand 1/24/24 -> 20 cm / 22 cm 1/25/24    #Hypernatremia  -Na up to 152  -likely due to NPO status  -adjust FWFs to Na  -Daily RFP     #c diff, recurrent episode?  (tx'ed w/PO vanc pulse dose in 3/2023 with taper, metronidazole 2/2023)  - as per chart review, has chronic diarrhea with IBS-type picture?  - diarrhea with positive PCR positive, EIA negative 1/18  - PCR was positive 5/2022, 10/2022, 2/2023. 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  -endocrine following, low concern for adrenal crisis  -on chronic prednisone (10mg + 5mg)   -started stress dose hydrocortisone 25mg q6hrs ([id number] on 1/14) in light of presumed infection, decreased to 15mg q6h 1/20  - Pulse steroid ended, 1/24/24 starting home PO pred 10 + 5 mg until seen w/ rheumatology as outpt     #Paroxysmal Atrial fibrillation on eliquis  #Intermittent sinus bradycardia  -Chadsvasc 4  -DOAC held 1/15 in anticipation of LP, started SQH for dvt ppx  -Resume DOAC prior to DC  -continue telemetry     #Chronic Pancytopenia  - likely s/t lupus  - Retic count 0.02 (LLN 0.018), TIBC low, % sat low, ferritin elevated 1/16  - Has required 2 units of packed red blood cells on 1/16 1/17  - Hemoglobin goal greater than 7, platelets greater than 10, if bleeding platelets  greater than 50.     #JED on CKD 3, resolved  - Bl ~ 1.6 -1.8, Cr trend 2.24 -> 1.28  - Improved with fluids     #CAD s/p CABG 2013  -statin    Dispo: PT/OT rec' SNF, pending discussion with son     F: Replete PRN  E: Replete PRN  N: TFs via DHT  DVT Ppx: SQH  GI Ppx: Esomeprazole  Access/Lines: 2 PIV, midline 1/16/ [place] 1/16     Code status: DNR and No Intubation  NOK: López Lang (son) [phone] (Home Phone)         Kirk Cervantes MD PhD  Internal Medicine, PGY1

## 2024-01-25 NOTE — CARE PLAN
The patient's goals for the shift include Up to the chair for meals throughout the shift    The clinical goals for the shift include pt will eat 50% of her meals during this shift    Over the shift, the patient did not make progress toward the following goals. Barriers to progression include . Recommendations to address these barriers include .

## 2024-01-26 LAB
ALBUMIN SERPL BCP-MCNC: 2.3 G/DL (ref 3.4–5)
ANION GAP SERPL CALC-SCNC: 13 MMOL/L (ref 10–20)
BACTERIA CSF CULT: NORMAL
BASOPHILS # BLD MANUAL: 0 X10*3/UL (ref 0–0.1)
BASOPHILS NFR BLD MANUAL: 0 %
BUN SERPL-MCNC: 26 MG/DL (ref 6–23)
CALCIUM SERPL-MCNC: 8.3 MG/DL (ref 8.6–10.6)
CHLORIDE SERPL-SCNC: 107 MMOL/L (ref 98–107)
CO2 SERPL-SCNC: 27 MMOL/L (ref 21–32)
CREAT SERPL-MCNC: 1.77 MG/DL (ref 0.5–1.05)
EGFRCR SERPLBLD CKD-EPI 2021: 32 ML/MIN/1.73M*2
EOSINOPHIL # BLD MANUAL: 0 X10*3/UL (ref 0–0.7)
EOSINOPHIL NFR BLD MANUAL: 0 %
ERYTHROCYTE [DISTWIDTH] IN BLOOD BY AUTOMATED COUNT: 18.6 % (ref 11.5–14.5)
EST. AVERAGE GLUCOSE BLD GHB EST-MCNC: 157 MG/DL
GLUCOSE BLD MANUAL STRIP-MCNC: 156 MG/DL (ref 74–99)
GLUCOSE BLD MANUAL STRIP-MCNC: 199 MG/DL (ref 74–99)
GLUCOSE BLD MANUAL STRIP-MCNC: 399 MG/DL (ref 74–99)
GLUCOSE SERPL-MCNC: 231 MG/DL (ref 74–99)
GRAM STN SPEC: NORMAL
GRAM STN SPEC: NORMAL
HBA1C MFR BLD: 7.1 %
HCT VFR BLD AUTO: 27.5 % (ref 36–46)
HGB BLD-MCNC: 8.1 G/DL (ref 12–16)
IMM GRANULOCYTES # BLD AUTO: 0.35 X10*3/UL (ref 0–0.7)
IMM GRANULOCYTES NFR BLD AUTO: 5.4 % (ref 0–0.9)
LYMPHOCYTES # BLD MANUAL: 0.8 X10*3/UL (ref 1.2–4.8)
LYMPHOCYTES NFR BLD MANUAL: 12.3 %
MAGNESIUM SERPL-MCNC: 1.68 MG/DL (ref 1.6–2.4)
MCH RBC QN AUTO: 29 PG (ref 26–34)
MCHC RBC AUTO-ENTMCNC: 29.5 G/DL (ref 32–36)
MCV RBC AUTO: 99 FL (ref 80–100)
MONOCYTES # BLD MANUAL: 0.57 X10*3/UL (ref 0.1–1)
MONOCYTES NFR BLD MANUAL: 8.7 %
NEUTROPHILS # BLD MANUAL: 5.13 X10*3/UL (ref 1.2–7.7)
NEUTS BAND # BLD MANUAL: 0.57 X10*3/UL (ref 0–0.7)
NEUTS BAND NFR BLD MANUAL: 8.8 %
NEUTS SEG # BLD MANUAL: 4.56 X10*3/UL (ref 1.2–7)
NEUTS SEG NFR BLD MANUAL: 70.2 %
NRBC BLD-RTO: 1.6 /100 WBCS (ref 0–0)
OVALOCYTES BLD QL SMEAR: ABNORMAL
PHOSPHATE SERPL-MCNC: 2.1 MG/DL (ref 2.5–4.9)
PLATELET # BLD AUTO: 154 X10*3/UL (ref 150–450)
POTASSIUM SERPL-SCNC: 4 MMOL/L (ref 3.5–5.3)
RBC # BLD AUTO: 2.79 X10*6/UL (ref 4–5.2)
RBC MORPH BLD: ABNORMAL
SODIUM SERPL-SCNC: 143 MMOL/L (ref 136–145)
TOTAL CELLS COUNTED BLD: 114
WBC # BLD AUTO: 6.5 X10*3/UL (ref 4.4–11.3)

## 2024-01-26 PROCEDURE — 1200000002 HC GENERAL ROOM WITH TELEMETRY DAILY

## 2024-01-26 PROCEDURE — 2500000002 HC RX 250 W HCPCS SELF ADMINISTERED DRUGS (ALT 637 FOR MEDICARE OP, ALT 636 FOR OP/ED)

## 2024-01-26 PROCEDURE — 82947 ASSAY GLUCOSE BLOOD QUANT: CPT

## 2024-01-26 PROCEDURE — 2500000001 HC RX 250 WO HCPCS SELF ADMINISTERED DRUGS (ALT 637 FOR MEDICARE OP)

## 2024-01-26 PROCEDURE — 83735 ASSAY OF MAGNESIUM: CPT

## 2024-01-26 PROCEDURE — 99232 SBSQ HOSP IP/OBS MODERATE 35: CPT | Performed by: INTERNAL MEDICINE

## 2024-01-26 PROCEDURE — 80069 RENAL FUNCTION PANEL: CPT

## 2024-01-26 PROCEDURE — 97530 THERAPEUTIC ACTIVITIES: CPT | Mod: GP,CQ

## 2024-01-26 PROCEDURE — 97110 THERAPEUTIC EXERCISES: CPT | Mod: GP,CQ

## 2024-01-26 PROCEDURE — 2500000004 HC RX 250 GENERAL PHARMACY W/ HCPCS (ALT 636 FOR OP/ED)

## 2024-01-26 PROCEDURE — 85027 COMPLETE CBC AUTOMATED: CPT

## 2024-01-26 PROCEDURE — 36415 COLL VENOUS BLD VENIPUNCTURE: CPT

## 2024-01-26 PROCEDURE — 85007 BL SMEAR W/DIFF WBC COUNT: CPT

## 2024-01-26 RX ORDER — MYCOPHENOLATE MOFETIL 250 MG/1
1000 CAPSULE ORAL 2 TIMES DAILY
Status: DISCONTINUED | OUTPATIENT
Start: 2024-01-26 | End: 2024-01-28 | Stop reason: HOSPADM

## 2024-01-26 RX ORDER — INSULIN GLARGINE 100 [IU]/ML
10 INJECTION, SOLUTION SUBCUTANEOUS EVERY 24 HOURS
Status: DISCONTINUED | OUTPATIENT
Start: 2024-01-26 | End: 2024-01-26

## 2024-01-26 RX ORDER — INSULIN GLARGINE 100 [IU]/ML
10 INJECTION, SOLUTION SUBCUTANEOUS NIGHTLY
Status: DISCONTINUED | OUTPATIENT
Start: 2024-01-26 | End: 2024-01-28 | Stop reason: HOSPADM

## 2024-01-26 RX ADMIN — INSULIN LISPRO 4 UNITS: 100 INJECTION, SOLUTION INTRAVENOUS; SUBCUTANEOUS at 19:10

## 2024-01-26 RX ADMIN — INSULIN LISPRO 2 UNITS: 100 INJECTION, SOLUTION INTRAVENOUS; SUBCUTANEOUS at 13:47

## 2024-01-26 RX ADMIN — INSULIN GLARGINE 10 UNITS: 100 INJECTION, SOLUTION SUBCUTANEOUS at 20:46

## 2024-01-26 RX ADMIN — LEVETIRACETAM 500 MG: 500 TABLET, FILM COATED ORAL at 00:53

## 2024-01-26 RX ADMIN — LEVETIRACETAM 500 MG: 500 TABLET, FILM COATED ORAL at 13:47

## 2024-01-26 RX ADMIN — PREDNISONE 15 MG: 5 TABLET ORAL at 09:47

## 2024-01-26 RX ADMIN — THIAMINE HCL TAB 100 MG 100 MG: 100 TAB at 09:47

## 2024-01-26 RX ADMIN — PANTOPRAZOLE SODIUM 40 MG: 40 TABLET, DELAYED RELEASE ORAL at 05:37

## 2024-01-26 RX ADMIN — MYCOPHENOLATE MOFETIL 1000 MG: 250 CAPSULE ORAL at 20:46

## 2024-01-26 RX ADMIN — HEPARIN SODIUM 5000 UNITS: 5000 INJECTION INTRAVENOUS; SUBCUTANEOUS at 00:53

## 2024-01-26 RX ADMIN — FOLIC ACID 1 MG: 1 TABLET ORAL at 09:47

## 2024-01-26 RX ADMIN — APIXABAN 2.5 MG: 5 TABLET, FILM COATED ORAL at 20:46

## 2024-01-26 RX ADMIN — HEPARIN SODIUM 5000 UNITS: 5000 INJECTION INTRAVENOUS; SUBCUTANEOUS at 09:47

## 2024-01-26 RX ADMIN — INSULIN LISPRO 2 UNITS: 100 INJECTION, SOLUTION INTRAVENOUS; SUBCUTANEOUS at 09:47

## 2024-01-26 ASSESSMENT — COGNITIVE AND FUNCTIONAL STATUS - GENERAL
DAILY ACTIVITIY SCORE: 12
MOVING FROM LYING ON BACK TO SITTING ON SIDE OF FLAT BED WITH BEDRAILS: A LOT
STANDING UP FROM CHAIR USING ARMS: A LOT
PERSONAL GROOMING: A LOT
TURNING FROM BACK TO SIDE WHILE IN FLAT BAD: A LOT
EATING MEALS: A LOT
MOBILITY SCORE: 11
MOVING TO AND FROM BED TO CHAIR: A LOT
STANDING UP FROM CHAIR USING ARMS: A LOT
WALKING IN HOSPITAL ROOM: A LOT
CLIMB 3 TO 5 STEPS WITH RAILING: TOTAL
MOVING FROM LYING ON BACK TO SITTING ON SIDE OF FLAT BED WITH BEDRAILS: A LOT
DRESSING REGULAR LOWER BODY CLOTHING: A LOT
TOILETING: A LOT
TURNING FROM BACK TO SIDE WHILE IN FLAT BAD: A LOT
DRESSING REGULAR UPPER BODY CLOTHING: A LOT
HELP NEEDED FOR BATHING: A LOT

## 2024-01-26 ASSESSMENT — PAIN SCALES - GENERAL
PAINLEVEL_OUTOF10: 0 - NO PAIN
PAINLEVEL_OUTOF10: 0 - NO PAIN

## 2024-01-26 ASSESSMENT — PAIN - FUNCTIONAL ASSESSMENT: PAIN_FUNCTIONAL_ASSESSMENT: 0-10

## 2024-01-26 NOTE — CARE PLAN
The patient's goals for the shift include Up to the chair for meals throughout the shift    The clinical goals for the shift include Pt will be free from fall during this shift      Problem: Pain  Goal: My pain/discomfort is manageable  Outcome: Progressing

## 2024-01-26 NOTE — PROGRESS NOTES
Bing Holliday is a 63 y.o. female on day 9 of admission presenting with Meningitis.    Subjective   [facility]port reviewed and updated that [facility] is able to accept tomorrow as they had a discharge at their facility. Discussed with senior resident who feels that patient should be medically ready for discharge tomorrow.    Call placed to son López and updated him regarding above; he expressed understanding and agreeable for discharge to [place].    Transportation requested for tomorrow 1/27 at 3pm via [employer]; awaiting confirmed time.    -Gogo BENSON, MA, LSW  [phone] or [facility] Secure [facility]  Care Transitions

## 2024-01-26 NOTE — PROGRESS NOTES
"Bing Holliday is a 63 y.o. female on day 9 of admission presenting with Meningitis.    Subjective   NAEON. Aox3. Otherwise doing well but still endorses R hand pain. Seen on rounds working w/ PT.     Denies N/V/D, CP, SOB, fevers, chills other s/s of systemic infection.      Objective     Physical Exam  Constitutional:       General: She is not in acute distress.     Appearance: She is not ill-appearing or diaphoretic.   HENT:      Head: Normocephalic and atraumatic.      Nose: Nose normal.   Eyes:      Extraocular Movements: Extraocular movements intact.      Pupils: Pupils are equal, round, and reactive to light.   Cardiovascular:      Rate and Rhythm: Normal rate and regular rhythm.   Pulmonary:      Effort: Pulmonary effort is normal.      Breath sounds: Normal breath sounds.   Abdominal:      General: Abdomen is flat.      Palpations: Abdomen is soft.   Musculoskeletal:         General: Swelling (R hand 23 cm circumferential on pen julia) and tenderness (R hand) present.      Cervical back: Normal range of motion and neck supple.   Skin:     General: Skin is warm and dry.   Neurological:      Mental Status: She is alert.         Last Recorded Vitals  Blood pressure 156/86, pulse 100, temperature 36.4 °C (97.5 °F), temperature source Temporal, resp. rate 20, height 1.727 m (5' 7.99\"), weight 99.3 kg (218 lb 14.7 oz), SpO2 92 %.  Intake/Output last 3 Shifts:  I/O last 3 completed shifts:  In: 1090 (11 mL/kg) [P.O.:590; IV Piggyback:500]  Out: 4200 (42.3 mL/kg) [Urine:4200 (1.2 mL/kg/hr)]  Dosing Weight: 99.3 kg     Relevant Results  Results for orders placed or performed during the hospital encounter of 01/17/24 (from the past 24 hour(s))   POCT GLUCOSE   Result Value Ref Range    POCT Glucose 385 (H) 74 - 99 mg/dL   POCT GLUCOSE   Result Value Ref Range    POCT Glucose 329 (H) 74 - 99 mg/dL   Vancomycin, Trough   Result Value Ref Range    Vancomycin, Trough 31.0 (HH) 5.0 - 20.0 ug/mL   Magnesium   Result Value " Ref Range    Magnesium 1.68 1.60 - 2.40 mg/dL   Renal Function Panel   Result Value Ref Range    Glucose 231 (H) 74 - 99 mg/dL    Sodium 143 136 - 145 mmol/L    Potassium 4.0 3.5 - 5.3 mmol/L    Chloride 107 98 - 107 mmol/L    Bicarbonate 27 21 - 32 mmol/L    Anion Gap 13 10 - 20 mmol/L    Urea Nitrogen 26 (H) 6 - 23 mg/dL    Creatinine 1.77 (H) 0.50 - 1.05 mg/dL    eGFR 32 (L) >60 mL/min/1.73m*2    Calcium 8.3 (L) 8.6 - 10.6 mg/dL    Phosphorus 2.1 (L) 2.5 - 4.9 mg/dL    Albumin 2.3 (L) 3.4 - 5.0 g/dL   CBC and Auto Differential   Result Value Ref Range    WBC 6.5 4.4 - 11.3 x10*3/uL    nRBC 1.6 (H) 0.0 - 0.0 /100 WBCs    RBC 2.79 (L) 4.00 - 5.20 x10*6/uL    Hemoglobin 8.1 (L) 12.0 - 16.0 g/dL    Hematocrit 27.5 (L) 36.0 - 46.0 %    MCV 99 80 - 100 fL    MCH 29.0 26.0 - 34.0 pg    MCHC 29.5 (L) 32.0 - 36.0 g/dL    RDW 18.6 (H) 11.5 - 14.5 %    Platelets 154 150 - 450 x10*3/uL    Immature Granulocytes %, Automated 5.4 (H) 0.0 - 0.9 %    Immature Granulocytes Absolute, Automated 0.35 0.00 - 0.70 x10*3/uL   Manual Differential   Result Value Ref Range    Neutrophils %, Manual 70.2 40.0 - 80.0 %    Bands %, Manual 8.8 0.0 - 5.0 %    Lymphocytes %, Manual 12.3 13.0 - 44.0 %    Monocytes %, Manual 8.7 2.0 - 10.0 %    Eosinophils %, Manual 0.0 0.0 - 6.0 %    Basophils %, Manual 0.0 0.0 - 2.0 %    Seg Neutrophils Absolute, Manual 4.56 1.20 - 7.00 x10*3/uL    Bands Absolute, Manual 0.57 0.00 - 0.70 x10*3/uL    Lymphocytes Absolute, Manual 0.80 (L) 1.20 - 4.80 x10*3/uL    Monocytes Absolute, Manual 0.57 0.10 - 1.00 x10*3/uL    Eosinophils Absolute, Manual 0.00 0.00 - 0.70 x10*3/uL    Basophils Absolute, Manual 0.00 0.00 - 0.10 x10*3/uL    Total Cells Counted 114     Neutrophils Absolute, Manual 5.13 1.20 - 7.70 x10*3/uL    RBC Morphology See Below     Ovalocytes Few    POCT GLUCOSE   Result Value Ref Range    POCT Glucose 199 (H) 74 - 99 mg/dL   POCT GLUCOSE   Result Value Ref Range    POCT Glucose 156 (H) 74 - 99 mg/dL       Assessment/Plan   Principal Problem:    Meningitis  Active Problems:    Encephalopathy    Seizure (CMS/HCC)    Bing Holliday is a 63 y.o. 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 initially c/f adrenal crisis from sepsis, but now transferred for inpatient rheum consult iso c/f lupus cerebritis vs meningitis vs adrenal insufficiency as etiology for encephalopathy. ID, endo, rheum and neuro consulted. LP completee 1/18. Imaging and labs work, inculding CSF studies unremarkable. Most likely cause of Ams at this point believed to be delirium s/t multiple admissions and seizures. Started on keppra by neuro. Mentation has continued to improve since admission, now pending dispo. On 1/22/23, R hand became warm and started swelling (measures 20 cm at wrist and 23 cm around dorsum of hand (1/24/24)). DVT U/S revealed partial occlusion of basilic vein no DVT. CT w/ poor views cannot r/o thrombophlebitis but will ctm.     Updates 1/26/24:  - OK for dispo to [facility] on 1/27/24  - 1/23/24 Blood Cx NGTD, pending  - Concerns for infection of R hand w/ possible septic thrombophlebitis given swelling and basilic vein. Ordered CT w Con of RUE, pending. CT inconclusive. Will continue to clinically monitor hand and size.  - Initiated vancomycin renally dosed by pharmacy, will likely transition to PO doxycycline on dispo. End date of vancomycin 1/27/24  - c/w 15 mg pred qD  - Restarted home DOAC apixaban 2.5 mg BID  - Restarted home MMF 1000 mg BID     #Encephalopathy  :: per daughter, worsening confusion over past year with several admissions since November for AMS (hypoglycemia, UTI, adrenal crisis s/t norovirus)  -1/15 MRI Brain w and w/o contrast demonstrated focal encephalomalacia in L occipital region unchanged  from previous exam. Hemosiderin deposit in L occipital region consistent w/ remote infarction/contusion. Scattered foci w/ increased signal in subcortical and periventricular white matter consistent w/ demyelination (new finding).    -Per neuro periventricular/juxtacortical white matter changes on FLAIR imaging similar to imaging from 03/2023. These changes less suggestive of lupus cerebritis and more suggestive of chronic age related white matter changes (as minimal to no white matter changes were seen on MRI from 2005)  -vEEG w/o epileptiform activity   -TSH, B12, folate wnl  -anti-dsDNA Ab negative, anti-ribosomal Ab negative  -KATHI panel w/ positive anti-SM/RNP and anti-chromatin ab positive,  C3/C4 wnl  -LP completed 1/18  -CSF glucose, protein normal, wbc 4, Crypto antigen negative, HSV pcr negative, cultures, meningitis/encephalitis panels negative, anti-neuronal Ab negative  -anti-ribosomal P ab, oligoclonal bands, IgG index, Cryptococcal Ab pending  -per rheum, normal rheumatologic work-up goes against active SLE (lupus cerebritis)  -per neuro, less likely lupus cerebritis, likely delirium given recurrent admissions and seizures  -per ID, unlikely infectious, stop empiric meningitis tx and IV acyclovir  -S/p keppra load, avitan at OSH, keppra continued 500 BID per neuro  - Ativan 2 mg IV for prolonged motor seizure lasting more than 3 minutes or a cluster of 3 or more motor seizures in an 8 hour period.     #SLE  - labs as above  - Holding MMF (1/16) in setting of presumed infection restarting on discharge  - Rheumatology and endocrine following, we appreciate recs.  - Pulse steroid ended, 1/24/24  starting home PO pred 10 + 5 mg until seen w/ rheumatology as outpt    #R hand edema  #Thrombophlebitis  :: On 1/22/23, R hand became warm and started swelling. DVT U/S revealed partial occlusion of basilic vein no DVT.   - Concerns for infection of R hand w/ possible septic thrombophlebitis given swelling and  basilic vein.   - DVT U/S revealed parital osclusive superficial thrombus within basiclic vein but no DVT. CT revealed edema but otherwise chronic changes, no discrete fluid collection but otherwise difficult read. No definitive septic thrombophlebitis but need to correlate clinically.  Ordered CT w Con of RUE, pending.  - Initiated vancomycin renally dosed by pharmacy  - Measurements circumferential 20 cm at wrist 23 cm around hand 1/24/24 -> 20 cm / 22 cm 1/25/24    #Hypernatremia  -Na up to 152  -likely due to NPO status  -adjust FWFs to Na  -Daily RFP     #c diff, recurrent episode?  (tx'ed w/PO vanc pulse dose in 3/2023 with taper, metronidazole 2/2023)  - as per chart review, has chronic diarrhea with IBS-type picture?  - diarrhea with positive PCR positive, EIA negative 1/18  - PCR was positive 5/2022, 10/2022, 2/2023. 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  - endocrine following, low concern for adrenal crisis  - on chronic prednisone (10mg + 5mg)   - started stress dose hydrocortisone 25mg q6hrs (702sie4 on 1/14) in light of presumed infection, decreased to 15mg q6h 1/20  - Pulse steroid ended, 1/24/24 starting home PO pred 10 + 5 mg until seen w/ rheumatology as outpt     #Paroxysmal Atrial fibrillation on eliquis  #Intermittent sinus bradycardia  -Chadsvasc 4  -DOAC held 1/15 in anticipation of LP, started SQH for dvt ppx  -Resume DOAC prior to DC  -continue telemetry     #Chronic Pancytopenia  - likely s/t lupus  - Retic count 0.02 (LLN 0.018), TIBC low, % sat low, ferritin elevated 1/16  - Has required 2 units of packed red blood cells on 1/16 1/17  - Hemoglobin goal greater than 7, platelets greater than 10, if bleeding platelets greater than 50.     #JED on CKD 3, resolved  - Bl ~ 1.6 -1.8, Cr trend 2.24 -> 1.7 (1/26)  - Improved with fluids     #CAD s/p CABG  2013  -statin    Dispo: PT/OT rec' SNF, plan for dispo to [facility] on 1/27     F: Replete PRN  E: Replete PRN  N: TFs via DHT  DVT Ppx: SQH  GI Ppx: Esomeprazole  Access/Lines: 2 PIV, midline 1/16/ [place] 1/16     Code status: DNR and No Intubation  NOK: López Lang (son) [phone] (Home Phone)         Kirk Cervantes MD PhD  Internal Medicine, PGY1

## 2024-01-26 NOTE — PROGRESS NOTES
Physical Therapy    Physical Therapy Treatment    Patient Name: Bing Holliday  MRN: [id number]  Today's Date: 1/26/2024  Time Calculation  Start Time: 0926  Stop Time: 1017  Time Calculation (min): 51 min       Assessment/Plan   PT Assessment  PT Assessment Results: Decreased strength, Decreased range of motion, Decreased endurance, Impaired balance, Decreased mobility, Decreased coordination, Pain  End of Session Communication: Bedside nurse  End of Session Patient Position: Bed, 3 rail up  PT Plan  Inpatient/Swing Bed or Outpatient: Inpatient  PT Plan  Treatment/Interventions: Bed mobility, Transfer training, Gait training, Balance training, Neuromuscular re-education, Strengthening, Range of motion, Endurance training, Therapeutic exercise, Therapeutic activity, Positioning  PT Plan: Skilled PT  PT Frequency: 3 times per week  PT Discharge Recommendations: Moderate intensity level of continued care  PT Recommended Transfer Status: Total assist  PT - OK to Discharge: Yes (Eval completed and D/C recommendation made.)      General Visit Information:   PT  Visit  PT Received On: 01/26/24  General  Reason for Referral: 63 y.o. 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 initially c/f adrenal crisis from sepsis, but now transferred for inpatient rheum consult iso c/f lupus cerebritis vs meningitis vs adrenal insufficiency as etiology for encephalopathy.      Meningitis has since been rule-out via viral panel.  Family/Caregiver Present: No  Prior to Session Communication: Bedside nurse  Patient Position Received: Bed, 3 rail up  General Comment: Pt. supine in bed upon arrival. Pt. states that she has pain in right ue - notably swollen. Pt. states that team is aware and is addressing. Pt. willing to particpate.    Subjective  "  Precautions:  Precautions  Medical Precautions: Fall precautions, Seizure precautions  Vital Signs:       Objective   Pain:  Pain Assessment  Pain Assessment: 0-10  Pain Score:  (Pt. does not rate but pain increased with mobility)  Cognition:     Postural Control:  Static Sitting Balance  Static Sitting-Balance Support: Bilateral upper extremity supported  Static Sitting-Level of Assistance: Close supervision  Static Sitting-Comment/Number of Minutes: Pt. has difficulty scooting to keep feet on the ground- kyphosis noted - difficulty sitting upright.  Dynamic Sitting Balance  Dynamic Sitting-Balance Support: Feet supported  Dynamic Sitting-Balance: Forward lean  Dynamic Sitting-Comments: Mod assist as pt. unable to balance.  Extremity/Trunk Assessments:                      Activity Tolerance:  Activity Tolerance  Endurance: Tolerates 10 - 20 min exercise with multiple rests  Treatments:  Therapeutic Exercise  Therapeutic Exercise Performed: Yes  Therapeutic Exercise Activity 1: Supine bilat le ex AP'S, QS, SAQ'S, HS, AND ABD X 15 REPS. (Pt. reports increased discomfort in right knee when flexing during SAQ's and HS- Pt. states \"I'm stiff\")    Therapeutic Activity  Therapeutic Activity Performed: Yes  Therapeutic Activity 1: During ther ex, pt. stated \"Oh no, i'm going to the bathroom\" - Pt. having a bowel movement- Due to incontinence , Increased time cleaning pt. and changing linens. Pt. able to roll left and right with mod assist.  Therapeutic Activity 2: Increased time sitting EOB to get used to change in position and due to discomfort with mobility time to rest before standing attempts. Pt. also required increased time resting in between standing attempts.    Bed Mobility  Bed Mobility: Yes  Bed Mobility 1  Bed Mobility 1: Supine to sitting  Bed Mobility Comments 1: Increased time required for pt. to get to EOB requiring min assist to turn and face straight ahead- bed linens used.  Bed Mobility 2  Bed " Mobility  2: Sitting to supine  Level of Assistance 2: Moderate assistance  Bed Mobility Comments 2: assist with bilat le's and to adjust upper body once supine.  Bed Mobility 3  Bed Mobility 3: Scooting  Level of Assistance 3: Dependent    Outcome Measures:  [facility] Basic Mobility  Turning from your back to your side while in a flat bed without using bedrails: A lot  Moving from lying on your back to sitting on the side of a flat bed without using bedrails: A lot  Moving to and from bed to chair (including a wheelchair): A lot  Standing up from a chair using your arms (e.g. wheelchair or bedside chair): A lot  To walk in hospital room: A lot  Climbing 3-5 steps with railing: Total  Basic Mobility - Total Score: 11    Education Documentation  Mobility Training, taught by Amparo Flores PTA at 1/26/2024 10:40 AM.  Learner: Patient  Readiness: Acceptance  Method: Explanation, Demonstration  Response: Needs Reinforcement    Education Comments  No comments found.        OP EDUCATION:       Encounter Problems       Encounter Problems (Active)       Balance       STG - Maintains static sitting balance without upper extremity support >/= 10 min  (Progressing)       Start:  01/20/24    Expected End:  02/03/24       INTERVENTIONS:  1. Practice sitting on the edge of a bed/mat with minimal support.  2. Educate patient about maintining total hip precautions while maintaining balance.  3. Educate patient about pressure relief.  4. Educate patient about use of assistive device.            Mobility       STG - Patient will ambulate >/= 25 ft with min A using RW (Progressing)       Start:  01/20/24    Expected End:  02/03/24               Transfers       STG - Transfer from bed to chair with min A using RW (Progressing)       Start:  01/20/24    Expected End:  02/03/24            STG - Patient will perform bed mobility with SBA (Progressing)       Start:  01/20/24    Expected End:  02/03/24            STG - Patient will transfer  sit to and from stand with CGA and RW (Progressing)       Start:  01/20/24    Expected End:  02/03/24

## 2024-01-27 VITALS
TEMPERATURE: 97.9 F | DIASTOLIC BLOOD PRESSURE: 86 MMHG | BODY MASS INDEX: 33.18 KG/M2 | HEIGHT: 68 IN | HEART RATE: 73 BPM | WEIGHT: 218.92 LBS | SYSTOLIC BLOOD PRESSURE: 153 MMHG | OXYGEN SATURATION: 91 % | RESPIRATION RATE: 18 BRPM

## 2024-01-27 LAB
ALBUMIN SERPL BCP-MCNC: 2.3 G/DL (ref 3.4–5)
ANION GAP SERPL CALC-SCNC: 12 MMOL/L (ref 10–20)
BACTERIA BLD CULT: NORMAL
BACTERIA BLD CULT: NORMAL
BASOPHILS # BLD MANUAL: 0 X10*3/UL (ref 0–0.1)
BASOPHILS NFR BLD MANUAL: 0 %
BUN SERPL-MCNC: 25 MG/DL (ref 6–23)
BURR CELLS BLD QL SMEAR: ABNORMAL
CALCIUM SERPL-MCNC: 8 MG/DL (ref 8.6–10.6)
CHLORIDE SERPL-SCNC: 106 MMOL/L (ref 98–107)
CO2 SERPL-SCNC: 28 MMOL/L (ref 21–32)
CREAT SERPL-MCNC: 1.91 MG/DL (ref 0.5–1.05)
EGFRCR SERPLBLD CKD-EPI 2021: 29 ML/MIN/1.73M*2
EOSINOPHIL # BLD MANUAL: 0 X10*3/UL (ref 0–0.7)
EOSINOPHIL NFR BLD MANUAL: 0 %
ERYTHROCYTE [DISTWIDTH] IN BLOOD BY AUTOMATED COUNT: 18.4 % (ref 11.5–14.5)
GLUCOSE BLD MANUAL STRIP-MCNC: 152 MG/DL (ref 74–99)
GLUCOSE BLD MANUAL STRIP-MCNC: 242 MG/DL (ref 74–99)
GLUCOSE BLD MANUAL STRIP-MCNC: 370 MG/DL (ref 74–99)
GLUCOSE SERPL-MCNC: 200 MG/DL (ref 74–99)
HCT VFR BLD AUTO: 26.2 % (ref 36–46)
HGB BLD-MCNC: 7.9 G/DL (ref 12–16)
IMM GRANULOCYTES # BLD AUTO: 0.41 X10*3/UL (ref 0–0.7)
IMM GRANULOCYTES NFR BLD AUTO: 5.6 % (ref 0–0.9)
LYMPHOCYTES # BLD MANUAL: 0.64 X10*3/UL (ref 1.2–4.8)
LYMPHOCYTES NFR BLD MANUAL: 8.8 %
MAGNESIUM SERPL-MCNC: 1.56 MG/DL (ref 1.6–2.4)
MCH RBC QN AUTO: 29 PG (ref 26–34)
MCHC RBC AUTO-ENTMCNC: 30.2 G/DL (ref 32–36)
MCV RBC AUTO: 96 FL (ref 80–100)
MONOCYTES # BLD MANUAL: 0.45 X10*3/UL (ref 0.1–1)
MONOCYTES NFR BLD MANUAL: 6.1 %
MYELOCYTES # BLD MANUAL: 0.13 X10*3/UL
MYELOCYTES NFR BLD MANUAL: 1.8 %
NEUTROPHILS # BLD MANUAL: 6.08 X10*3/UL (ref 1.2–7.7)
NEUTS BAND # BLD MANUAL: 0.83 X10*3/UL (ref 0–0.7)
NEUTS BAND NFR BLD MANUAL: 11.4 %
NEUTS SEG # BLD MANUAL: 5.25 X10*3/UL (ref 1.2–7)
NEUTS SEG NFR BLD MANUAL: 71.9 %
NRBC BLD-RTO: 1.5 /100 WBCS (ref 0–0)
OVALOCYTES BLD QL SMEAR: ABNORMAL
PHOSPHATE SERPL-MCNC: 2.5 MG/DL (ref 2.5–4.9)
PLATELET # BLD AUTO: 188 X10*3/UL (ref 150–450)
POLYCHROMASIA BLD QL SMEAR: ABNORMAL
POTASSIUM SERPL-SCNC: 3.9 MMOL/L (ref 3.5–5.3)
RBC # BLD AUTO: 2.72 X10*6/UL (ref 4–5.2)
RBC MORPH BLD: ABNORMAL
SCHISTOCYTES BLD QL SMEAR: ABNORMAL
SODIUM SERPL-SCNC: 142 MMOL/L (ref 136–145)
TOTAL CELLS COUNTED BLD: 114
VANCOMYCIN SERPL-MCNC: 9.3 UG/ML (ref 5–20)
WBC # BLD AUTO: 7.3 X10*3/UL (ref 4.4–11.3)

## 2024-01-27 PROCEDURE — 36415 COLL VENOUS BLD VENIPUNCTURE: CPT

## 2024-01-27 PROCEDURE — 2500000001 HC RX 250 WO HCPCS SELF ADMINISTERED DRUGS (ALT 637 FOR MEDICARE OP)

## 2024-01-27 PROCEDURE — 80202 ASSAY OF VANCOMYCIN: CPT

## 2024-01-27 PROCEDURE — 83735 ASSAY OF MAGNESIUM: CPT

## 2024-01-27 PROCEDURE — 2500000004 HC RX 250 GENERAL PHARMACY W/ HCPCS (ALT 636 FOR OP/ED)

## 2024-01-27 PROCEDURE — 99239 HOSP IP/OBS DSCHRG MGMT >30: CPT | Performed by: INTERNAL MEDICINE

## 2024-01-27 PROCEDURE — 82947 ASSAY GLUCOSE BLOOD QUANT: CPT

## 2024-01-27 PROCEDURE — 85007 BL SMEAR W/DIFF WBC COUNT: CPT

## 2024-01-27 PROCEDURE — 1200000002 HC GENERAL ROOM WITH TELEMETRY DAILY

## 2024-01-27 PROCEDURE — 85027 COMPLETE CBC AUTOMATED: CPT

## 2024-01-27 PROCEDURE — 80069 RENAL FUNCTION PANEL: CPT

## 2024-01-27 RX ORDER — LANOLIN ALCOHOL/MO/W.PET/CERES
100 CREAM (GRAM) TOPICAL DAILY
Qty: 30 TABLET | Refills: 0
Start: 2024-01-27 | End: 2024-02-27 | Stop reason: ALTCHOICE

## 2024-01-27 RX ORDER — INSULIN LISPRO 100 [IU]/ML
0-10 INJECTION, SOLUTION INTRAVENOUS; SUBCUTANEOUS
Qty: 9 ML | Refills: 1
Start: 2024-01-27 | End: 2024-02-27 | Stop reason: SDUPTHER

## 2024-01-27 RX ORDER — OXYCODONE HYDROCHLORIDE 5 MG/1
5 TABLET ORAL EVERY 6 HOURS PRN
Qty: 15 TABLET | Refills: 0 | Status: SHIPPED
Start: 2024-01-27 | End: 2024-03-06 | Stop reason: WASHOUT

## 2024-01-27 RX ORDER — LOPERAMIDE HYDROCHLORIDE 2 MG/1
2 CAPSULE ORAL 4 TIMES DAILY PRN
Qty: 30 CAPSULE | Refills: 0
Start: 2024-01-27 | End: 2024-03-26 | Stop reason: HOSPADM

## 2024-01-27 RX ORDER — VANCOMYCIN HYDROCHLORIDE 1 G/200ML
15 INJECTION, SOLUTION INTRAVENOUS ONCE
Status: COMPLETED | OUTPATIENT
Start: 2024-01-27 | End: 2024-01-27

## 2024-01-27 RX ORDER — ACETAMINOPHEN 325 MG/1
975 TABLET ORAL EVERY 6 HOURS PRN
Qty: 30 TABLET | Refills: 0
Start: 2024-01-27 | End: 2024-03-14 | Stop reason: HOSPADM

## 2024-01-27 RX ORDER — PREDNISONE 5 MG/1
15 TABLET ORAL DAILY
Qty: 63 TABLET | Refills: 0
Start: 2024-01-27 | End: 2024-02-27 | Stop reason: ALTCHOICE

## 2024-01-27 RX ORDER — LEVETIRACETAM 500 MG/1
500 TABLET ORAL EVERY 12 HOURS
Qty: 60 TABLET | Refills: 1
Start: 2024-01-27 | End: 2024-02-27 | Stop reason: SDUPTHER

## 2024-01-27 RX ORDER — INSULIN GLARGINE 100 [IU]/ML
10 INJECTION, SOLUTION SUBCUTANEOUS NIGHTLY
Qty: 3 ML | Refills: 1
Start: 2024-01-27 | End: 2024-03-07 | Stop reason: SDUPTHER

## 2024-01-27 RX ADMIN — ACETAMINOPHEN 975 MG: 325 TABLET ORAL at 15:18

## 2024-01-27 RX ADMIN — LEVETIRACETAM 500 MG: 500 TABLET, FILM COATED ORAL at 13:01

## 2024-01-27 RX ADMIN — INSULIN GLARGINE 10 UNITS: 100 INJECTION, SOLUTION SUBCUTANEOUS at 21:40

## 2024-01-27 RX ADMIN — OXYCODONE HYDROCHLORIDE 5 MG: 5 TABLET ORAL at 15:18

## 2024-01-27 RX ADMIN — INSULIN LISPRO 10 UNITS: 100 INJECTION, SOLUTION INTRAVENOUS; SUBCUTANEOUS at 18:23

## 2024-01-27 RX ADMIN — INSULIN LISPRO 2 UNITS: 100 INJECTION, SOLUTION INTRAVENOUS; SUBCUTANEOUS at 08:35

## 2024-01-27 RX ADMIN — INSULIN LISPRO 6 UNITS: 100 INJECTION, SOLUTION INTRAVENOUS; SUBCUTANEOUS at 13:01

## 2024-01-27 RX ADMIN — OXYCODONE HYDROCHLORIDE 5 MG: 5 TABLET ORAL at 08:35

## 2024-01-27 RX ADMIN — FOLIC ACID 1 MG: 1 TABLET ORAL at 08:35

## 2024-01-27 RX ADMIN — APIXABAN 2.5 MG: 5 TABLET, FILM COATED ORAL at 21:40

## 2024-01-27 RX ADMIN — PANTOPRAZOLE SODIUM 40 MG: 40 TABLET, DELAYED RELEASE ORAL at 06:13

## 2024-01-27 RX ADMIN — PREDNISONE 15 MG: 5 TABLET ORAL at 08:35

## 2024-01-27 RX ADMIN — APIXABAN 2.5 MG: 5 TABLET, FILM COATED ORAL at 08:35

## 2024-01-27 RX ADMIN — VANCOMYCIN HYDROCHLORIDE 1000 MG: 1 INJECTION, SOLUTION INTRAVENOUS at 15:17

## 2024-01-27 RX ADMIN — MYCOPHENOLATE MOFETIL 1000 MG: 250 CAPSULE ORAL at 21:40

## 2024-01-27 RX ADMIN — THIAMINE HCL TAB 100 MG 100 MG: 100 TAB at 08:35

## 2024-01-27 RX ADMIN — MYCOPHENOLATE MOFETIL 1000 MG: 250 CAPSULE ORAL at 08:35

## 2024-01-27 RX ADMIN — LEVETIRACETAM 500 MG: 500 TABLET, FILM COATED ORAL at 00:30

## 2024-01-27 ASSESSMENT — PAIN DESCRIPTION - LOCATION: LOCATION: HAND

## 2024-01-27 ASSESSMENT — PAIN SCALES - GENERAL: PAINLEVEL_OUTOF10: 8

## 2024-01-27 ASSESSMENT — COGNITIVE AND FUNCTIONAL STATUS - GENERAL
TURNING FROM BACK TO SIDE WHILE IN FLAT BAD: A LOT
EATING MEALS: A LOT
DRESSING REGULAR UPPER BODY CLOTHING: A LOT
DAILY ACTIVITIY SCORE: 12
MOVING TO AND FROM BED TO CHAIR: A LOT
PERSONAL GROOMING: A LOT
MOVING FROM LYING ON BACK TO SITTING ON SIDE OF FLAT BED WITH BEDRAILS: A LOT
CLIMB 3 TO 5 STEPS WITH RAILING: A LOT
MOBILITY SCORE: 12
STANDING UP FROM CHAIR USING ARMS: A LOT
DRESSING REGULAR LOWER BODY CLOTHING: A LOT
TOILETING: A LOT
WALKING IN HOSPITAL ROOM: A LOT
HELP NEEDED FOR BATHING: A LOT

## 2024-01-27 ASSESSMENT — PAIN DESCRIPTION - ORIENTATION: ORIENTATION: RIGHT

## 2024-01-27 ASSESSMENT — PAIN - FUNCTIONAL ASSESSMENT: PAIN_FUNCTIONAL_ASSESSMENT: 0-10

## 2024-01-27 NOTE — PROGRESS NOTES
"Vancomycin Dosing by Pharmacy- FOLLOW UP    Bing Holliday is a 63 y.o. year old female who has been consulted for vancomycin dosing for a cellulitis, skin and soft tissue infection. Based on the patient's indication and renal status this patient is being dosed based on a goal trough/random level of 10-15.     Renal function is currently declining. UOP is good. Scr worsening and has been experiencing JED.     Last dose of vancomycin was on 1/24 for 1.5g.      Most recent random level: 9.3 mcg/mL    Visit Vitals  /86   Pulse 73   Temp 36.6 °C (97.9 °F)   Resp 18        Lab Results   Component Value Date    CREATININE 1.91 (H) 01/27/2024    CREATININE 1.77 (H) 01/26/2024    CREATININE 1.91 (H) 01/24/2024    CREATININE 1.93 (H) 01/23/2024        Patient weight is No results found for: \"PTWEIGHT\"    No results found for: \"CULTURE\"     I/O last 3 completed shifts:  In: - (0 mL/kg)   Out: 2700 (27.2 mL/kg) [Urine:2700 (0.8 mL/kg/hr)]  Dosing Weight: 99.3 kg   [unfilled]    Lab Results   Component Value Date    PATIENTTEMP 37.0 01/18/2024    PATIENTTEMP 37.0 11/13/2022        Assessment/Plan    Random level of 9.3 is slightly below goal trough (10-15). Order placed for a one time dose of vancomycin 1000 g to be given today 1/26.   The next level will be obtained on 1/28 with AM labs.   Will continue to monitor renal function daily while on vancomycin and order serum creatinine at least every 48 hours if not already ordered.  Follow for continued vancomycin needs, clinical response, and signs/symptoms of toxicity.       Leyda Hall, PharmD           "

## 2024-01-27 NOTE — CARE PLAN
The patient's goals for the shift include pain control    The clinical goals for the shift include Patient will have optimal pain control    Over the shift, the patient did not make progress toward the following goals. Barriers to progression include   . Recommendations to address these barriers include   .

## 2024-01-27 NOTE — DISCHARGE SUMMARY
Discharge Diagnosis  Seizure  SLE    Issues Requiring Follow-Up  Rheumatology to taper prednisone outpatient  Monitor hyperglycemia on steroids requiring insulin, likely can stop insulin after steroids as pt is not normally on   No need for further antibiotics     Test Results Pending At Discharge  Pending Labs       Order Current Status    Encephalopathy-Autoimmune Evaluation,CSF In process    Blood Culture Preliminary result    Blood Culture Preliminary result            Hospital Course  Bing Holliday is a 63 y.o. 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 Transferred to [facility] for inpatient rheum consult iso c/f lupus cerebritis vs meningitis vs adrenal insufficiency as etiology for encephalopathy.    Presented from SNF to [facility] ED on 1/14/2024 for complaints of confusion and weakness with upper and low back pain. In the nursing home, she had 2d hx of weakness, worsening confusion, hallucinations (speaking to people who were not there), inability to ambulate by self, reports of chills.  Tachycardic (114) and hypotensive (BP 84/69) in ED. Labs notable for mild hypoglycemia (66), mild HyperNa (148), BUN/Cr of 51/2.24, bsl Hgb 8.2, plts 101, trop negative, lactate 3.4, UA wo WBCs. Imaging notable for CTH wo acute ICH, CXR wo acute cardiopulm process, CT A/P w/ cardiomegaly w/ CAD, colonic diverticulosis wo acute diverticulitis, non-obstructing R sided nephrolithiasis. Initial EKG w/ AFib w/ RVR at 105. Interventions included D50 x25g, Vanc/Cefepime/Flagyl, 2L LR, 2g MgSulfate, 100mg Methylpred.      Admitted to hospital for AMS, metabolic disturbances c/f adrenal insufficiency d/t patient being on chronic steroids and possible medication non-adherence iso worsening confusion.  During hospital stay, patient was noted  to vacillate between A&Ox0 to A&Ox2 (self and location) though status waxed/waned throughout day. Patient reported diffuse abd pain and b/l arm/leg pain to superficial palpation. B/l DVT U/S negative for DVT. Noted to have chronic sacral wound, pink, clean, intact, wo signs of infection. 1/15 MRI Brain w and w/o contrast demonstrated focal encephalomalacia in L occipital region unchanged from previous exam. Hemosiderin deposit in L occipital region consistent w/ remote infarction/contusion. Scattered foci w/ increased signal in subcortical and periventricular white matter consistent w/ demyelination (new finding).      On 1/16 PM, patient had clinically deteriorated, AOx0, no longer nodding/shaking head to questions, diffusely sensitive to light palpation over whole body. Neuro c/s on 1/16 for continuous facial twitching while awake w/ rapid eye blinking, lip smacking, which was refractory to Ativan, who rec'd vEEG, keppra load and maintenance, Ativan, lumbar puncture, and transfer to ICU for closer monitoring. Continuous EEG demonstrated status epilepticus for which patient as tx w/ Keppra/Ativan. Neuro rec'd Neuro ICU for continuous vEEG and real time titration of medications/therapy. Transfer center call convened, and neuro ICU attending did not feel patient required neuro ICU; MICU attending was requested instead, accepted to MICU pending bed availability.      Patient Cellcept held on 1/16.  Despite worsening mentation, patient was not intubated as she is DNI. However, patient was empirically started on empiric meningitis/encephalitis coverage is immunosuppression: Ampicillin, CTX, Vancomycin, Acyclovir.     On arrival to [facility] MICU, rheumatology, ID, neurology, and endocrine consulted.  LP completed 1/18. DHT placed for poor mentation and hypernatremia. She remained stable in MICU, so was transferred to floor 1/19. Per endocrine, low concern for adrenal crisis causing acute presentation. She ws continued on  SDS, which were titrated throughout admission. CSF glucose, protein, cultures and HSV PCR negative, so stopped empiric meningitis tx and acyclovir per ID. Video eeg stopped per neuro, as was no longer having epileptiform activity. Mentation improved on floor, so DHT was removed. Most likely cause of AMS at this point believed to be delirium s/t multiple admissions (since November) and seizures. Plan to continue keppra on DC. Transitioned to home PO prednisone 15 mg every day, apixaban 2.5 mg BID, and Mycophenylate 1000 mg BID. On 1/23/26, developed R hand pain and swelling. CT revealed partial occlusion of basilic vein w/o DVT. Patient started on IV vancomycin for potential cellulitis w/ concern for thrombophlebitis. Swelling improved and patient transitioned to PO doxycycline on discharge. Discharged to [facility] on 1/27/24 w/ follow up with rheumatology referral in next month.      Follow-Up  -per neuro, AMS likely s/t delirium and seizures, continue keppra 500 BID  -Pt not normally on insulin for diabetes but requires it for hyperglycemia from her steroids. Need for insulin must be monitored by facility physician and can likely be discontinued after steroids.  -Rheumatology to taper steroids outpatient    Pertinent Physical Exam At Time of Discharge  Physical Exam  Eyes:      Extraocular Movements: Extraocular movements intact.      Conjunctiva/sclera: Conjunctivae normal.      Pupils: Pupils are equal, round, and reactive to light.   Cardiovascular:      Rate and Rhythm: Normal rate and regular rhythm.   Pulmonary:      Effort: Pulmonary effort is normal. No respiratory distress.      Breath sounds: Normal breath sounds.   Abdominal:      General: Abdomen is flat. Bowel sounds are normal.      Palpations: Abdomen is soft.   Musculoskeletal:      Comments: R hand swelling and tenderness, 21 cm at wrist, 24 cm circumferential at pen julia, stable from prior   Skin:     General: Skin is warm and dry.    Neurological:      General: No focal deficit present.      Mental Status: She is alert.         Home Medications     Medication List      START taking these medications     apixaban 2.5 mg tablet; Commonly known as: Eliquis; Take 1 tablet (2.5   mg) by mouth 2 times a day.   insulin glargine 100 unit/mL injection; Commonly known as: Lantus;   Inject 10 Units under the skin once daily at bedtime. Take as directed per   insulin instructions.   insulin lispro 100 unit/mL injection; Commonly known as: HumaLOG; Inject   0-0.1 mL (0-10 Units) under the skin 3 times a day with meals. Take as   directed per insulin instructions.   levETIRAcetam 500 mg tablet; Commonly known as: Keppra; Take 1 tablet   (500 mg) by mouth every 12 hours.   loperamide 2 mg capsule; Commonly known as: Imodium; Take 1 capsule (2   mg) by mouth 4 times a day as needed for diarrhea.   oxyCODONE 5 mg immediate release tablet; Commonly known as: Roxicodone;   Take 1 tablet (5 mg) by mouth every 6 hours if needed for severe pain (7 -   10).   thiamine 100 mg tablet; Commonly known as: Vitamin B-1; Take 1 tablet   (100 mg) by mouth once daily.     CHANGE how you take these medications     acetaminophen 325 mg tablet; Commonly known as: Tylenol; Take 3 tablets   (975 mg) by mouth every 6 hours if needed for moderate pain (4 - 6) or   mild pain (1 - 3).; What changed: how much to take, when to take this,   reasons to take this   predniSONE 5 mg tablet; Commonly known as: Deltasone; Take 3 tablets (15   mg) by mouth once daily.; What changed: medication strength, how much to   take, when to take this, additional instructions, Another medication with   the same name was removed. Continue taking this medication, and follow the   directions you see here.     CONTINUE taking these medications     amLODIPine 2.5 mg tablet; Commonly known as: Norvasc   atorvastatin 40 mg tablet; Commonly known as: Lipitor; Take 1 tablet (40   mg) by mouth once daily.    Benlysta 400 mg recon soln IV injection; Generic drug: belimumab   Ca carb-D3-mag jv-tta-okyu-Zn 300 mg-20 mcg- 25 mg-0.5 mg tablet   Dexcom G6  misc; Generic drug: Dexcom G4 platinum ; Use   as instructed   Dexcom G6 Sensor device; Generic drug: blood-glucose sensor; Use to   check sugars 3 times daily   Dexcom G6 Transmitter device; Generic drug: Dexcom G4 platinum   transmitter; Use as instructed   folic acid 1 mg tablet; Commonly known as: Folvite; TAKE 1 TABLET BY   MOUTH EVERY DAY   melatonin 5 mg tablet   multivitamin tablet   mycophenolate 500 mg tablet; Commonly known as: Cellcept   pantoprazole 40 mg EC tablet; Commonly known as: ProtoNix; TAKE 1 TABLET   BY MOUTH EVERY DAY   Trelegy Ellipta 200-62.5-25 mcg blister with device; Generic drug:   fluticasone-umeclidin-vilanter; Inhale 1 puff once daily.     STOP taking these medications     risperiDONE 0.5 mg tablet; Commonly known as: RisperDAL   torsemide 10 mg tablet; Commonly known as: Demadex       Outpatient Follow-Up  Future Appointments   Date Time Provider Department [place]   2/1/2024  4:00 PM INF 03 [id number] [facility]   2/8/2024 11:30 AM Claudio Kearney MD [id number] [facility]   2/13/2024 10:30 AM Michael Arenas MD [id number] [facility]   3/11/2024  3:40 PM Sarah Castellanos DO [id number] [facility]       Jessica Kelly MD

## 2024-01-27 NOTE — PROGRESS NOTES
Bing Holliday is a 63 y.o. female on day 10 of admission presenting with Meningitis.    Subjective   Received update from primary team that patient is medically cleared for discharge to [facility]. Transportation confirmed for 4:30pm with [facility]. Completed Realitos uploaded and sent to facility via [facility].     Call placed to norberto Dawn and notified him of planned discharge this afternoon; no further questions or concerns.    Blue form completed and provided to unit secretary.    -Gogo BENSON, MA, LSW  [phone] or [facility] Secure Chat  Care Transitions

## 2024-01-27 NOTE — CARE PLAN
The patient's goals for the shift include Up to the chair for meals throughout the shift    The clinical goals for the shift include Patient will void 30cc hourly during this shift

## 2024-01-28 NOTE — NURSING NOTE
Report called to SNF [facility].     [id number] Updated SNF that ETA is now 0030. Ok to send patient as they are a facility to accept patients 24 hours.

## 2024-01-29 ENCOUNTER — NURSING HOME VISIT (OUTPATIENT)
Dept: PRIMARY CARE | Facility: CLINIC | Age: 64
End: 2024-01-29
Payer: MEDICARE

## 2024-01-29 DIAGNOSIS — N18.31 CKD STAGE G3A/A2, GFR 45-59 AND ALBUMIN CREATININE RATIO 30-299 MG/G (MULTI): Chronic | ICD-10-CM

## 2024-01-29 DIAGNOSIS — M32.14 LUPUS NEPHRITIS (MULTI): ICD-10-CM

## 2024-01-29 DIAGNOSIS — E44.0 MODERATE PROTEIN-CALORIE MALNUTRITION (MULTI): ICD-10-CM

## 2024-01-29 DIAGNOSIS — I27.20 PULMONARY HYPERTENSION (MULTI): Primary | Chronic | ICD-10-CM

## 2024-01-29 DIAGNOSIS — E11.21 DIABETIC NEPHROPATHY ASSOCIATED WITH TYPE 2 DIABETES MELLITUS (MULTI): ICD-10-CM

## 2024-01-29 DIAGNOSIS — E27.40 ADRENAL INSUFFICIENCY (MULTI): ICD-10-CM

## 2024-01-29 DIAGNOSIS — E11.42 DM TYPE 2 WITH DIABETIC PERIPHERAL NEUROPATHY (MULTI): Chronic | ICD-10-CM

## 2024-01-29 PROCEDURE — 99305 1ST NF CARE MODERATE MDM 35: CPT | Performed by: STUDENT IN AN ORGANIZED HEALTH CARE EDUCATION/TRAINING PROGRAM

## 2024-01-29 RX ORDER — ACETAMINOPHEN 325 MG/1
650 TABLET ORAL ONCE
OUTPATIENT
Start: 2024-02-01

## 2024-01-29 RX ORDER — DIPHENHYDRAMINE HCL 25 MG
25 CAPSULE ORAL ONCE
OUTPATIENT
Start: 2024-02-01

## 2024-01-29 ASSESSMENT — ENCOUNTER SYMPTOMS
PSYCHIATRIC NEGATIVE: 1
MUSCULOSKELETAL NEGATIVE: 1
WEAKNESS: 1
CONSTITUTIONAL NEGATIVE: 1
GASTROINTESTINAL NEGATIVE: 1
RESPIRATORY NEGATIVE: 1
CARDIOVASCULAR NEGATIVE: 1

## 2024-01-30 NOTE — PROGRESS NOTES
Subjective   Patient ID: Bing Holliday is a 63 y.o. female.    Patient is a 63-year-old female with past medical history of lupus, checkered arthroplasty, CKD, COPD, GERD, A-fib, CAD who presented to the hospital due to worsening encephalopathy as well as abdominal pain and weakness.  Patient was previously admitted to SNF and had multiple days of worsening hallucinations and inability to ambulate.  Was sent to the ED for further evaluation in the ED was found to be tachycardic and hypotensive.  As well as mild hypoglycemia.  Patient was admitted for further management with known adrenal insufficiency and noted to have waxing and waning mentation.  Patient had altered mental status and neurology was consulted.  And was Keppra loaded.  Subsequently, she was transferred downtown to [facility].  Lumbar puncture was done which was unremarkable.  An infectious workup was subsequently negative.  Patient's altered mentation was likely delirium as well as potentially underlying steroid treatments.  Patient was recommended discharge to SNF for further management.  And was recommended to p.o. doxycycline on discharge.  On evaluation, patient overall feels well.  She feels significantly weak however mentation stable at this time.  She regards no acute complaints or concerns.        Review of Systems   Constitutional: Negative.    HENT: Negative.     Respiratory: Negative.     Cardiovascular: Negative.    Gastrointestinal: Negative.    Musculoskeletal: Negative.    Neurological:  Positive for weakness.   Psychiatric/Behavioral: Negative.         Objective Vitals Reviewed via facility EMR   Physical Exam  Constitutional:       General: She is not in acute distress.     Appearance: She is not ill-appearing.      Comments: At baseline mentation   Eyes:      Pupils: Pupils are equal, round, and reactive to light.   Cardiovascular:      Rate and Rhythm: Normal rate and regular rhythm.      Pulses: Normal pulses.      Heart sounds: No  murmur heard.  Pulmonary:      Effort: No respiratory distress.      Breath sounds: No wheezing.   Abdominal:      General: Abdomen is flat. Bowel sounds are normal. There is no distension.   Musculoskeletal:      Right lower leg: No edema.      Left lower leg: No edema.   Skin:     General: Skin is warm and dry.   Neurological:      Mental Status: She is alert. Mental status is at baseline.      Cranial Nerves: No cranial nerve deficit.      Motor: No weakness.   Psychiatric:         Mood and Affect: Mood normal.         Behavior: Behavior normal.         Assessment/Plan   There are no diagnoses linked to this encounter.      Patient seen and evaluated.  Extensive hospital course reviewed and medications verified and reconciled.  Patient had extensive altered mental status workup done in the hospital but was mostly unremarkable.  Will try to avoid sedating medications.  Patient would benefit from additional physical therapy.  Due to multiple hospitalizations which are becoming more frequent as of late, patient ultimately may benefit from long-term care in assisted living or extended stay nursing facility.  Continue supportive care.  PT OT.  Monitor weekly labs.  Does have frequent episodes of hypoglycemia, will closely monitor with glucose checks.    Reviewed and approved by MAURO DOHERTY on 1/29/24 at 8:07 PM.

## 2024-01-30 NOTE — PROGRESS NOTES
Per Dr. Castellanos:   Please hold Benlysta for now. She was just treated for recent infection. Will need to reevalaute..   Also would prefer Solucortef 50 mg for her       Changing steroid in order for her and putting benlysta therapy plan on hold.

## 2024-01-31 ENCOUNTER — NURSING HOME VISIT (OUTPATIENT)
Dept: POST ACUTE CARE | Facility: EXTERNAL LOCATION | Age: 64
End: 2024-01-31
Payer: MEDICARE

## 2024-01-31 DIAGNOSIS — E27.40 ADRENAL INSUFFICIENCY (MULTI): ICD-10-CM

## 2024-01-31 DIAGNOSIS — I27.20 PULMONARY HYPERTENSION (MULTI): Primary | Chronic | ICD-10-CM

## 2024-01-31 DIAGNOSIS — E11.42 DM TYPE 2 WITH DIABETIC PERIPHERAL NEUROPATHY (MULTI): Chronic | ICD-10-CM

## 2024-01-31 DIAGNOSIS — E44.0 MODERATE PROTEIN-CALORIE MALNUTRITION (MULTI): ICD-10-CM

## 2024-01-31 DIAGNOSIS — R26.2 AMBULATORY DYSFUNCTION: ICD-10-CM

## 2024-01-31 DIAGNOSIS — N18.31 CKD STAGE G3A/A2, GFR 45-59 AND ALBUMIN CREATININE RATIO 30-299 MG/G (MULTI): Chronic | ICD-10-CM

## 2024-01-31 DIAGNOSIS — R73.09 UNCONTROLLED BLOOD GLUCOSE: ICD-10-CM

## 2024-01-31 PROCEDURE — 99348 HOME/RES VST EST LOW MDM 30: CPT | Performed by: STUDENT IN AN ORGANIZED HEALTH CARE EDUCATION/TRAINING PROGRAM

## 2024-01-31 ASSESSMENT — ENCOUNTER SYMPTOMS
MUSCULOSKELETAL NEGATIVE: 1
PSYCHIATRIC NEGATIVE: 1
CONSTITUTIONAL NEGATIVE: 1
RESPIRATORY NEGATIVE: 1
GASTROINTESTINAL NEGATIVE: 1
NEUROLOGICAL NEGATIVE: 1
CARDIOVASCULAR NEGATIVE: 1

## 2024-02-01 ENCOUNTER — TELEPHONE (OUTPATIENT)
Dept: RHEUMATOLOGY | Facility: CLINIC | Age: 64
End: 2024-02-01

## 2024-02-01 ENCOUNTER — APPOINTMENT (OUTPATIENT)
Dept: INFUSION THERAPY | Facility: CLINIC | Age: 64
End: 2024-02-01
Payer: MEDICARE

## 2024-02-01 NOTE — PROGRESS NOTES
Subjective   Patient ID: Bing Holliday is a 63 y.o. female.    Patient seen and examined at bedside.  She regards that her strength is overall improving.  Did have an extensive hospitalization so she feels more weak than usual.  Discussed discharge planning with patient, she endorses that she lives at home with her son as well as other family members.  She does feel like she gets adequate support at home so she is not concerned about discharge back to the home.  In addition, they have discussed long-term care options with the patient, however they do not think that it is reasonable at this time as they can coordinate all the care of the patient.  Otherwise, she reports no additional issues or concerns.        Review of Systems   Constitutional: Negative.    HENT: Negative.     Respiratory: Negative.     Cardiovascular: Negative.    Gastrointestinal: Negative.    Musculoskeletal: Negative.    Neurological: Negative.    Psychiatric/Behavioral: Negative.         Objective Vitals Reviewed via facility EMR   Physical Exam  Constitutional:       General: She is not in acute distress.     Appearance: She is not ill-appearing.      Comments: NAD, in wheelchair   Eyes:      Pupils: Pupils are equal, round, and reactive to light.   Cardiovascular:      Rate and Rhythm: Normal rate and regular rhythm.      Pulses: Normal pulses.      Heart sounds: No murmur heard.  Pulmonary:      Effort: No respiratory distress.      Breath sounds: No wheezing.   Abdominal:      General: Abdomen is flat. Bowel sounds are normal. There is no distension.   Musculoskeletal:      Right lower leg: No edema.      Left lower leg: No edema.   Skin:     General: Skin is warm and dry.   Neurological:      Mental Status: She is alert. Mental status is at baseline.      Cranial Nerves: No cranial nerve deficit.      Motor: No weakness.   Psychiatric:         Mood and Affect: Mood normal.         Behavior: Behavior normal.         Assessment/Plan    Diagnoses and all orders for this visit:  Pulmonary hypertension (CMS/[facility])  Moderate protein-calorie malnutrition (CMS/[facility])  DM type 2 with diabetic peripheral neuropathy (CMS/[facility])  Adrenal insufficiency (CMS/[facility])  CKD stage G3a/A2, GFR 45-59 and albumin creatinine ratio  mg/g (CMS/[facility])  Ambulatory dysfunction  Uncontrolled blood glucose    Patient seen and examined, has been slowly improving with physical therapy.  Due to patient being on chronic steroids, her blood sugar levels have been slowly increasing.  She is on 10 units of Lantus at night, however likely not getting enough coverage throughout the day.  Will increase the dose to 15 units as well as the insulin sliding scale throughout the day.  In addition, blood sugar checks during the day did reveal a blood sugar of 500, recommend initiation of 20 units of lispro at this time.  Will need to closely monitor for episodes of hypoglycemia as patient is prone to this.  Otherwise, continue supportive care.    Reviewed and approved by MAURO DOHERTY on 1/31/24 at 9:55 PM.

## 2024-02-01 NOTE — TELEPHONE ENCOUNTER
----- Message from Sarah Boucher DO sent at 2/1/2024  1:05 PM EST -----  It was cancelled. We will discuss at her next visit..  ----- Message -----  From: Rodríguez Mcgregor MA  Sent: 2/1/2024  10:46 AM EST  To: Sarah Boucher DO    Hey dr boucher, what are the plans for her benlysta? She called today to see if she can r/s her infusion

## 2024-02-05 DIAGNOSIS — M32.9 SYSTEMIC LUPUS ERYTHEMATOSUS, UNSPECIFIED SLE TYPE, UNSPECIFIED ORGAN INVOLVEMENT STATUS (MULTI): Primary | ICD-10-CM

## 2024-02-05 LAB
FUNGUS SPEC CULT: NORMAL
FUNGUS SPEC FUNGUS STN: NORMAL

## 2024-02-07 ENCOUNTER — NURSING HOME VISIT (OUTPATIENT)
Dept: POST ACUTE CARE | Facility: EXTERNAL LOCATION | Age: 64
End: 2024-02-07
Payer: MEDICARE

## 2024-02-07 DIAGNOSIS — E27.40 ADRENAL INSUFFICIENCY (MULTI): ICD-10-CM

## 2024-02-07 DIAGNOSIS — E11.42 DM TYPE 2 WITH DIABETIC PERIPHERAL NEUROPATHY (MULTI): Chronic | ICD-10-CM

## 2024-02-07 DIAGNOSIS — R73.9 HYPERGLYCEMIA: ICD-10-CM

## 2024-02-07 DIAGNOSIS — E44.0 MODERATE PROTEIN-CALORIE MALNUTRITION (MULTI): ICD-10-CM

## 2024-02-07 DIAGNOSIS — I48.91 ATRIAL FIBRILLATION, UNSPECIFIED TYPE (MULTI): Chronic | ICD-10-CM

## 2024-02-07 DIAGNOSIS — I27.20 PULMONARY HYPERTENSION (MULTI): Primary | Chronic | ICD-10-CM

## 2024-02-07 PROCEDURE — 99308 SBSQ NF CARE LOW MDM 20: CPT | Performed by: STUDENT IN AN ORGANIZED HEALTH CARE EDUCATION/TRAINING PROGRAM

## 2024-02-07 PROCEDURE — RXMED WILLOW AMBULATORY MEDICATION CHARGE

## 2024-02-07 RX ORDER — MYCOPHENOLATE MOFETIL 500 MG/1
1000 TABLET ORAL 2 TIMES DAILY
Qty: 360 TABLET | Refills: 0 | Status: ON HOLD | OUTPATIENT
Start: 2024-02-07

## 2024-02-07 ASSESSMENT — ENCOUNTER SYMPTOMS
RESPIRATORY NEGATIVE: 1
PSYCHIATRIC NEGATIVE: 1
MUSCULOSKELETAL NEGATIVE: 1
CONSTITUTIONAL NEGATIVE: 1
NEUROLOGICAL NEGATIVE: 1
GASTROINTESTINAL NEGATIVE: 1
CARDIOVASCULAR NEGATIVE: 1

## 2024-02-07 NOTE — LETTER
Patient: Bing Holliday  : 1960    Encounter Date: 2024    Subjective  Patient ID: Bing Holliday is a 63 y.o. female.    Patient seen and examined at bedside.  She regards that she is feeling the best that she has felt since being discharged from the hospital.  States that her strength is getting back to normal, and almost feels ready to be discharged home.  Her biggest concern now is moving forward, how she can prevent further hospitalizations.  Regards that she does not have any insulin supplies at home despite her being on insulin during the hospitalization as well as in the facility.  Otherwise, regards no additional issues or concerns.        Review of Systems   Constitutional: Negative.    HENT: Negative.     Respiratory: Negative.     Cardiovascular: Negative.    Gastrointestinal: Negative.    Musculoskeletal: Negative.    Neurological: Negative.    Psychiatric/Behavioral: Negative.         ObjectiveVitals Reviewed via facility EMR   Physical Exam  Constitutional:       General: She is not in acute distress.     Appearance: She is not ill-appearing.   Eyes:      Pupils: Pupils are equal, round, and reactive to light.   Cardiovascular:      Rate and Rhythm: Normal rate and regular rhythm.      Pulses: Normal pulses.      Heart sounds: No murmur heard.  Pulmonary:      Effort: No respiratory distress.      Breath sounds: No wheezing.   Abdominal:      General: Abdomen is flat. Bowel sounds are normal. There is no distension.   Musculoskeletal:      Right lower leg: No edema.      Left lower leg: No edema.   Skin:     General: Skin is warm and dry.   Neurological:      Mental Status: She is alert. Mental status is at baseline.      Cranial Nerves: No cranial nerve deficit.      Motor: No weakness.   Psychiatric:         Mood and Affect: Mood normal.         Behavior: Behavior normal.         Assessment/Plan  Diagnoses and all orders for this visit:  Pulmonary hypertension (CMS/HCC)  Atrial  fibrillation, unspecified type (CMS/HCC)  Moderate protein-calorie malnutrition (CMS/HCC)  Hyperglycemia  DM type 2 with diabetic peripheral neuropathy (CMS/HCC)  Adrenal insufficiency (CMS/HCC)    Patient seen and examined, overall medically stable and medically optimized.  She did inquire about further care planning moving forward.  Is high risk for rehospitalization.  Patient has been having labile blood sugars while at the facility, highly advised insulin therapy moving forward, however she does endorse that she does not have the supplies.  We will ensure patient has these on discharge.  Advise close monitoring of blood sugars while in the facility so we can discharge on appropriate regimen.  Highly encourage follow-up with specialist on discharge.  Continue supportive care.    Reviewed and approved by AYAAN DOHERTY 2/7/24 at 3:41 PM.       Electronically Signed By: Ayaan Doherty DO   2/7/24  3:42 PM

## 2024-02-07 NOTE — PROGRESS NOTES
Subjective   Patient ID: Bing Holliday is a 63 y.o. female.    Patient seen and examined at bedside.  She regards that she is feeling the best that she has felt since being discharged from the hospital.  States that her strength is getting back to normal, and almost feels ready to be discharged home.  Her biggest concern now is moving forward, how she can prevent further hospitalizations.  Regards that she does not have any insulin supplies at home despite her being on insulin during the hospitalization as well as in the facility.  Otherwise, regards no additional issues or concerns.        Review of Systems   Constitutional: Negative.    HENT: Negative.     Respiratory: Negative.     Cardiovascular: Negative.    Gastrointestinal: Negative.    Musculoskeletal: Negative.    Neurological: Negative.    Psychiatric/Behavioral: Negative.         Objective Vitals Reviewed via facility EMR   Physical Exam  Constitutional:       General: She is not in acute distress.     Appearance: She is not ill-appearing.   Eyes:      Pupils: Pupils are equal, round, and reactive to light.   Cardiovascular:      Rate and Rhythm: Normal rate and regular rhythm.      Pulses: Normal pulses.      Heart sounds: No murmur heard.  Pulmonary:      Effort: No respiratory distress.      Breath sounds: No wheezing.   Abdominal:      General: Abdomen is flat. Bowel sounds are normal. There is no distension.   Musculoskeletal:      Right lower leg: No edema.      Left lower leg: No edema.   Skin:     General: Skin is warm and dry.   Neurological:      Mental Status: She is alert. Mental status is at baseline.      Cranial Nerves: No cranial nerve deficit.      Motor: No weakness.   Psychiatric:         Mood and Affect: Mood normal.         Behavior: Behavior normal.         Assessment/Plan   Diagnoses and all orders for this visit:  Pulmonary hypertension (CMS/HCC)  Atrial fibrillation, unspecified type (CMS/HCC)  Moderate protein-calorie  malnutrition (CMS/HCC)  Hyperglycemia  DM type 2 with diabetic peripheral neuropathy (CMS/HCC)  Adrenal insufficiency (CMS/HCC)    Patient seen and examined, overall medically stable and medically optimized.  She did inquire about further care planning moving forward.  Is high risk for rehospitalization.  Patient has been having labile blood sugars while at the facility, highly advised insulin therapy moving forward, however she does endorse that she does not have the supplies.  We will ensure patient has these on discharge.  Advise close monitoring of blood sugars while in the facility so we can discharge on appropriate regimen.  Highly encourage follow-up with specialist on discharge.  Continue supportive care.    Reviewed and approved by MAURO DOHERTY on 2/7/24 at 3:41 PM.

## 2024-02-08 ENCOUNTER — PHARMACY VISIT (OUTPATIENT)
Dept: PHARMACY | Facility: CLINIC | Age: 64
End: 2024-02-08
Payer: MEDICARE

## 2024-02-08 ENCOUNTER — OFFICE VISIT (OUTPATIENT)
Dept: NEUROSURGERY | Facility: CLINIC | Age: 64
End: 2024-02-08
Payer: MEDICARE

## 2024-02-08 VITALS
HEIGHT: 70 IN | TEMPERATURE: 96.6 F | WEIGHT: 193 LBS | DIASTOLIC BLOOD PRESSURE: 86 MMHG | BODY MASS INDEX: 27.63 KG/M2 | SYSTOLIC BLOOD PRESSURE: 118 MMHG

## 2024-02-08 DIAGNOSIS — M48.062 SPINAL STENOSIS OF LUMBAR REGION WITH NEUROGENIC CLAUDICATION: Primary | ICD-10-CM

## 2024-02-08 DIAGNOSIS — M47.12 CERVICAL SPONDYLOSIS WITH MYELOPATHY: ICD-10-CM

## 2024-02-08 DIAGNOSIS — M81.0 AGE-RELATED OSTEOPOROSIS WITHOUT CURRENT PATHOLOGICAL FRACTURE: ICD-10-CM

## 2024-02-08 PROCEDURE — 99215 OFFICE O/P EST HI 40 MIN: CPT | Performed by: NEUROLOGICAL SURGERY

## 2024-02-08 PROCEDURE — 1036F TOBACCO NON-USER: CPT | Performed by: NEUROLOGICAL SURGERY

## 2024-02-08 PROCEDURE — 3008F BODY MASS INDEX DOCD: CPT | Performed by: NEUROLOGICAL SURGERY

## 2024-02-08 PROCEDURE — 3074F SYST BP LT 130 MM HG: CPT | Performed by: NEUROLOGICAL SURGERY

## 2024-02-08 PROCEDURE — 3079F DIAST BP 80-89 MM HG: CPT | Performed by: NEUROLOGICAL SURGERY

## 2024-02-08 PROCEDURE — 3051F HG A1C>EQUAL 7.0%<8.0%: CPT | Performed by: NEUROLOGICAL SURGERY

## 2024-02-08 ASSESSMENT — PATIENT HEALTH QUESTIONNAIRE - PHQ9
1. LITTLE INTEREST OR PLEASURE IN DOING THINGS: NOT AT ALL
2. FEELING DOWN, DEPRESSED OR HOPELESS: SEVERAL DAYS
10. IF YOU CHECKED OFF ANY PROBLEMS, HOW DIFFICULT HAVE THESE PROBLEMS MADE IT FOR YOU TO DO YOUR WORK, TAKE CARE OF THINGS AT HOME, OR GET ALONG WITH OTHER PEOPLE: NOT DIFFICULT AT ALL
SUM OF ALL RESPONSES TO PHQ9 QUESTIONS 1 AND 2: 1
1. LITTLE INTEREST OR PLEASURE IN DOING THINGS: NOT AT ALL
2. FEELING DOWN, DEPRESSED OR HOPELESS: SEVERAL DAYS
SUM OF ALL RESPONSES TO PHQ9 QUESTIONS 1 & 2: 1

## 2024-02-08 ASSESSMENT — LIFESTYLE VARIABLES
HOW MANY STANDARD DRINKS CONTAINING ALCOHOL DO YOU HAVE ON A TYPICAL DAY: 1 OR 2
HOW OFTEN DO YOU HAVE SIX OR MORE DRINKS ON ONE OCCASION: NEVER
SKIP TO QUESTIONS 9-10: 1
AUDIT-C TOTAL SCORE: 1
HOW OFTEN DO YOU HAVE A DRINK CONTAINING ALCOHOL: MONTHLY OR LESS

## 2024-02-08 ASSESSMENT — ENCOUNTER SYMPTOMS: OCCASIONAL FEELINGS OF UNSTEADINESS: 0

## 2024-02-08 ASSESSMENT — PAIN SCALES - GENERAL: PAINLEVEL: 3

## 2024-02-08 NOTE — PROGRESS NOTES
[facility]  Department of Neurological Surgery  Established Patient Visit    History of Present Illness  Bing Holliday is a 63 y.o. year old female who presents to the spine clinic in follow up with severe low back pain.  I saw the patient at [facility] back in the fall 2023.  We identified significant lumbar canal stenosis from L2 down to L5 with a kyphotic deformity secondary to the severe degenerative changes in her disc spaces and facets at those levels.  Since then she is been hospitalized again.  She is making progress.  Her blood sugars are one of the main sticking points for her recovery.  Her pain in the back currently is limited to the back.  It does not radiate into her buttocks or down the back of her legs or the front of her legs.  The longer she stands the worse it is.  She gets good relief from sitting down.  This sounds like back claudication.  Since she does not have symptoms going down her legs and her pain is limited to her back I am also going to send her to a pain management doctor to see if she is a candidate for selective facet blocks and if appropriate radiofrequency ablations in the lumbar region.  A CAT scan of the cervical spine was done previously that showed significant spondylosis with the sigmoid curvature of her cervical spine.  No one has looked at an MRI of her cervical spine.  She has lost all fine motor coordination in her hands.  She does have severe arthritis but I think she is also showing signs of myelopathy.  I am going to go ahead and order plain x-rays including flexion and extension views and an MRI of her cervical spine.    Patient's BMI is Body mass index is 27.69 kg/m².    14/14 systems reviewed and negative other than what is listed in the history of present illness    Patient Active Problem List   Diagnosis    COVID-19    Lupus (CMS/HCC)    Ambulatory dysfunction    Myelodysplastic syndrome, unspecified (CMS/HCC)    Lupus  vasculitis (CMS/HCC)    Hyperlipidemia    Pneumonia of both lower lobes due to infectious organism    Hallucinations    Leg swelling    Hyperglycemia    JED (acute kidney injury) (CMS/HCC)    Hypernatremia    Proliferative diabetic retinopathy of right eye without macular edema determined by examination associated with type 2 diabetes mellitus (CMS/[facility])    Urinary incontinence    Weakness of both lower extremities    Abdominal cramping    Abnormal echocardiogram    Abnormal gait    Abnormal thyroid blood test    Advanced COPD (CMS/[facility])    Afferent pupillary defect of right eye    Anemia    Pancytopenia (CMS/HCC)    Altitudinal scotoma of right eye    Arcuate scotoma of left eye    Arthropathy    Asymptomatic PVCs    Atrial fibrillation (CMS/[facility])    Balance problem    Bilateral high frequency sensorineural hearing loss    Bradycardia    Branch retinal artery occlusion    Cardiomyopathy (CMS/HCC)    Chronic diarrhea    Chronic fatigue    Chronic kidney disease (CKD), stage III (moderate) (CMS/[facility])    CKD stage G3a/A2, GFR 45-59 and albumin creatinine ratio  mg/g (CMS/[facility])    Combined forms of age-related cataract of left eye    Combined forms of age-related cataract of right eye    Contracture of hand    Snoring    CVA (cerebral vascular accident) (CMS/[facility])    Daytime somnolence    Degeneration of lumbar/lumbosacral disc without myelopathy    Depression, major    Dizziness    Dupuytren's contracture    Eczema    Elevated alkaline phosphatase level    Encephalopathy acute    Facial twitching    Fibromyalgia    Fungal nail infection    GERD (gastroesophageal reflux disease)    Gouty arthropathy    H/O iritis    H/O orthostatic hypotension    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[facility])    High level of cardiac marker    Hip hematoma, right    Hypothermia    Insomnia    Leg edema, left    Loss of consciousness (CMS/[facility])    Low blood sugar reading    Lung nodule, multiple    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[facility])    Metabolic  encephalopathy    Muscle cramps    Nodule of skin of left lower leg    Obstructive lung disease (CMS/HCC)    Osteoarthritis of left hand    Osteoarthritis of right hand    Osteopenia    Osteoporosis    Palpitations    Peripheral visual field defect    Persistent proteinuria    Positive colorectal cancer screening using Cologuard test    Benign essential HTN    Psychosis (CMS/HCC)    Pulmonary hypertension (CMS/HCC)    Refractive error    Hypertensive retinopathy    Retinal neovascularization    Secondary pulmonary arterial hypertension (CMS/HCC)    Shortness of breath on exertion    Spinal stenosis of lumbar region with neurogenic claudication    Subjective tinnitus of both ears    Swelling of right foot    Syncope and collapse    Thrombophlebitis of superficial veins of left lower extremity    Tinnitus of left ear    Vitamin D deficiency    DM type 2 with diabetic peripheral neuropathy (CMS/HCC)    Systemic lupus erythematosus (CMS/HCC)    Diabetic nephropathy (CMS/HCC)    Functional diarrhea    UTI (urinary tract infection)    Moderate protein-calorie malnutrition (CMS/HCC)    Shock, unspecified (CMS/HCC)    Adrenal insufficiency (CMS/HCC)    Seizure-like activity (CMS/HCC)    Meningitis    Encephalopathy    Seizure (CMS/HCC)    Uncontrolled blood glucose    Cervical spondylosis with myelopathy     Past Medical History:   Diagnosis Date    Acute upper respiratory infection, unspecified 03/04/2020    Acute URI    Acute upper respiratory infection, unspecified 09/30/2015    URTI (acute upper respiratory infection)    Arthritis     Body mass index (BMI) 23.0-23.9, adult 10/15/2021    BMI 23.0-23.9, adult    Body mass index (BMI) 33.0-33.9, adult 03/04/2020    BMI 33.0-33.9,adult    Cardiomegaly 08/27/2013    Left ventricular hypertrophy    Chronic kidney disease, stage 3 unspecified (CMS/HCC) 07/02/2013    Chronic kidney disease, stage III (moderate)    Disease of pericardium, unspecified 07/02/2013    Pericardial  disease    Encounter for follow-up examination after completed treatment for conditions other than malignant neoplasm 10/06/2022    Hospital discharge follow-up    Generalized contraction of visual field, right eye 01/29/2015    Generalized contraction of visual field of right eye    Homonymous bilateral field defects, right side 04/29/2016    Homonymous bilateral field defects of right side    Hypertensive chronic kidney disease with stage 1 through stage 4 chronic kidney disease, or unspecified chronic kidney disease 07/02/2013    Nephrosclerosis    Laceration without foreign body, left foot, initial encounter 07/03/2018    Foot laceration, left, initial encounter    Migraine with aura, not intractable, without status migrainosus 10/24/2022    Ocular migraine    Other conditions influencing health status 07/02/2013    Chronic Glomerulonephritis In Diseases Classified Elsewhere    Other conditions influencing health status 07/02/2013    Progressive Familial Myoclonic Epilepsy    Other conditions influencing health status 07/02/2013    Protein S Deficiency    Other conditions influencing health status 05/22/2015    Familial Combined Hyperlipidemia    Other conditions influencing health status 10/24/2022    IDDM (insulin dependent diabetes mellitus)    Other conditions influencing health status 03/14/2022    Diabetes mellitus, insulin dependent (IDDM), uncontrolled    Other long term (current) drug therapy 10/24/2022    Long-term use of Plaquenil    Personal history of diseases of the blood and blood-forming organs and certain disorders involving the immune mechanism 07/02/2013    History of thrombocytopenia    Personal history of diseases of the skin and subcutaneous tissue 08/11/2015    History of foot ulcer    Personal history of nephrotic syndrome 07/02/2013    History of nephrotic syndrome    Personal history of other diseases of the circulatory system 08/27/2013    History of sinus tachycardia    Personal  history of other diseases of the nervous system and sense organs     History of cataract    Personal history of other diseases of the respiratory system     History of bronchitis    Personal history of other infectious and parasitic diseases 07/02/2013    History of hepatitis    Personal history of other specified conditions     History of shortness of breath    Personal history of other specified conditions 08/27/2013    History of edema    Puckering of macula, right eye 10/24/2022    ERM OD (epiretinal membrane, right eye)    Raynaud's syndrome without gangrene 07/02/2013    Raynaud's disease    Systemic lupus erythematosus, unspecified (CMS/HCC) 07/24/2015    SLE (systemic lupus erythematosus)    Systemic lupus erythematosus, unspecified (CMS/HCC) 07/24/2015    SLE (systemic lupus erythematosus)    Systemic lupus erythematosus, unspecified (CMS/HCC) 07/24/2015    Systemic lupus    Type 2 diabetes mellitus with diabetic nephropathy (CMS/HCC) 07/02/2013    Type 2 diabetes with nephropathy    Type 2 diabetes mellitus with mild nonproliferative diabetic retinopathy without macular edema, left eye (CMS/HCC) 07/27/2015    Non-proliferative diabetic retinopathy, left eye    Type 2 diabetes mellitus with mild nonproliferative diabetic retinopathy without macular edema, unspecified eye (CMS/HCC) 07/24/2015    Mild non proliferative diabetic retinopathy    Type 2 diabetes mellitus with proliferative diabetic retinopathy without macular edema, right eye (CMS/HCC) 07/27/2015    Proliferative diabetic retinopathy of right eye    Type 2 diabetes mellitus with proliferative diabetic retinopathy without macular edema, unspecified eye (CMS/HCC) 07/24/2015    Diabetic proliferative retinopathy    Unspecified acute and subacute iridocyclitis 07/24/2015    Acute iritis, right eye    Unspecified open wound, left foot, sequela 07/03/2018    Wound, open, foot, left, sequela     Past Surgical History:   Procedure Laterality Date     ANKLE SURGERY  01/29/2015    Ankle Surgery    CHOLECYSTECTOMY  01/29/2015    Cholecystectomy    CT GUIDED PERCUTANEOUS BIOPSY BONE DEEP  5/4/2021    CT GUIDED PERCUTANEOUS BIOPSY BONE DEEP 5/4/2021 [facility] CL[facility]ICAL [facility]GAC[facility]    EYE SURGERY  03/06/2015    Eye Surgery    FOOT SURGERY  01/29/2015    Foot Surgery    MR HEAD ANGIO WO IV CONTRAST  7/26/2013    MR HEAD ANGIO WO IV CONTRAST 7/26/2013 [facility] CLINICAL LEGACY    MR HEAD ANGIO WO IV CONTRAST  9/17/2021    MR HEAD ANGIO WO IV CONTRAST 9/17/2021 [facility]    MR HEAD ANGIO WO IV CONTRAST  3/25/2023    MR HEAD ANGIO WO IV CONTRAST [facility]    MR NECK ANGIO WO IV CONTRAST  7/26/2013    MR NECK ANGIO WO IV CONTRAST 7/26/2013 [facility] CLINICAL LEGAC[facility]    MR NECK ANGIO WO IV CONTRAST  9/17/2021    MR NECK ANGIO WO IV CONTRAST 9/17/2021 [facility]    MR NECK ANGIO WO IV CONTRAST  3/25/2023    MR NECK ANGIO WO IV CONTRAST [facility]    OTHER SURGICAL HISTORY  01/29/2015    Creation Of Pericardial Window    OTHER SURGICAL HISTORY  01/29/2015    Quadricepsplasty    TOTAL HIP ARTHROPLASTY  01/29/2015    Hip Replacement     Social History     Tobacco Use    Smoking status: Never     Passive exposure: Never    Smokeless tobacco: Never   Substance Use Topics    Alcohol use: Never     family history includes CARDIAC DISORDER in her father, mother, and sister; COPD in her father; Cataracts in her mother; Depression in her sister; Diabetes in her mother; Glaucoma in her father; Hypertension in her father; Kidney disease in her mother and sister; Lupus in her mother; RENAL DISEASE in her mother; Sickle cell trait in her sister; Sleep apnea in her daughter and father; Stroke in her mother.    Current Outpatient Medications:     acetaminophen (Tylenol) 325 mg tablet, Take 3 tablets (975 mg) by mouth every 6 hours if needed for moderate pain (4 - 6) or mild pain (1 - 3)., Disp: 30 tablet, Rfl: 0    amLODIPine (Norvasc) 2.5 mg tablet, Take 1 tablet (2.5 mg) by mouth once daily.,  Disp: , Rfl:     apixaban (Eliquis) 2.5 mg tablet, Take 1 tablet (2.5 mg) by mouth 2 times a day., Disp: 60 tablet, Rfl: 1    atorvastatin (Lipitor) 40 mg tablet, Take 1 tablet (40 mg) by mouth once daily., Disp: 90 tablet, Rfl: 3    belimumab (Benlysta) 400 mg recon soln IV injection, Infuse 10 mg/kg into a venous catheter every 28 (twenty-eight) days.  (900mg per dose), Disp: , Rfl:     blood-glucose sensor (Dexcom G6 Sensor) device, Use to check sugars 3 times daily, Disp: 4 each, Rfl: 2    Ca carb-D3-mag bl-vsq-msoo-Zn 300 mg-20 mcg- 25 mg-0.5 mg tablet, Take 1 tablet by mouth 2 times a day., Disp: , Rfl:     Dexcom G4 platinum  (Dexcom G6 ) misc, Use as instructed, Disp: 1 each, Rfl: 0    Dexcom G4 platinum transmitter (Dexcom G6 Transmitter) device, Use as instructed, Disp: 1 each, Rfl: 0    fluticasone-umeclidin-vilanter (TRELEGY-ELLIPTA) 200-62.5-25 mcg blister with device, Inhale 1 puff once daily., Disp: 60 each, Rfl: 5    folic acid (Folvite) 1 mg tablet, TAKE 1 TABLET BY MOUTH EVERY DAY, Disp: 90 tablet, Rfl: 1    insulin glargine (Lantus) 100 unit/mL injection, Inject 10 Units under the skin once daily at bedtime. Take as directed per insulin instructions., Disp: 3 mL, Rfl: 1    insulin lispro (HumaLOG) 100 unit/mL injection, Inject 0-0.1 mL (0-10 Units) under the skin 3 times a day with meals. Take as directed per insulin instructions., Disp: 9 mL, Rfl: 1    levETIRAcetam (Keppra) 500 mg tablet, Take 1 tablet (500 mg) by mouth every 12 hours., Disp: 60 tablet, Rfl: 1    loperamide (Imodium) 2 mg capsule, Take 1 capsule (2 mg) by mouth 4 times a day as needed for diarrhea., Disp: 30 capsule, Rfl: 0    melatonin 5 mg tablet, Take 1 tablet (5 mg) by mouth once daily at bedtime., Disp: , Rfl:     multivitamin tablet, Take 1 tablet by mouth once daily., Disp: , Rfl:     mycophenolate (Cellcept) 500 mg tablet, TAKE 2 TABLETS BY MOUTH TWICE A DAY, Disp: 360 tablet, Rfl: 0    oxyCODONE  "(Roxicodone) 5 mg immediate release tablet, Take 1 tablet (5 mg) by mouth every 6 hours if needed for severe pain (7 - 10)., Disp: 15 tablet, Rfl: 0    pantoprazole (ProtoNix) 40 mg EC tablet, TAKE 1 TABLET BY MOUTH EVERY DAY, Disp: 90 tablet, Rfl: 1    predniSONE (Deltasone) 5 mg tablet, Take 3 tablets (15 mg) by mouth once daily., Disp: 63 tablet, Rfl: 0    thiamine (Vitamin B-1) 100 mg tablet, Take 1 tablet (100 mg) by mouth once daily., Disp: 30 tablet, Rfl: 0  Allergies   Allergen Reactions    Ace Inhibitors Swelling, Angioedema, Shortness of breath and Other     'FACIAL TWISTING\" \"LOOKS LIKE I HAD A STROKE IN MY SLEEP\"    Hydroxychloroquine Other, Nausea And Vomiting, Nausea/vomiting and Unknown     RETINAL BLEEDING    RETINAL BLEEDING      RETINAL BLEEDING, Eye problems    Lisinopril Swelling    Penicillins Unknown     tolerates cephalosporins-unknown childhood allergy    Sulfa (Sulfonamide Antibiotics) Hives       Physical Examination:      General: NAD, AOx 3,  no aphasia or dysarthria, normal fund of knowledge  Cranial Nerves II-XII: VFF, PERRL, EOMI, Face Symm, Facial SILT, Palate/Tongue midline and symmetric, she is a little hard of hearing but for the most part she can hear reasonably well  Motor: 5/5 Throughout all extremities in the proximal muscles but the distal muscles of her hands are almost impossible to test for because of her severe clawing of the hand,  No drift, no dysmetria on finger to nose  Sensation: SILT and PP throughout all extremities  DTRS: Absent throughout, No Hoffmans or Clonus    Results:  I personally reviewed and interpreted the imaging results which included the MRI and the CAT scan which show the severe degeneration at the L2-3, L3-4, and L4-5 levels.  She does have some foraminal stenosis bilaterally at L5-S1 especially on the left side.    Assessment and Plan:      Bing Holliday is a 63 y.o. year old female who presents to the spine clinic in follow up with severe low " back pain.  I saw the patient at [facility] back in the fall 2023.  We identified significant lumbar canal stenosis from L2 down to L5 with a kyphotic deformity secondary to the severe degenerative changes in her disc spaces and facets at those levels.  Since then she is been hospitalized again.  She is making progress.  Her blood sugars are one of the main sticking points for her recovery.  Her pain in the back currently is limited to the back.  It does not radiate into her buttocks or down the back of her legs or the front of her legs.  The longer she stands the worse it is.  She gets good relief from sitting down.  This sounds like back claudication.  Since she does not have symptoms going down her legs and her pain is limited to her back I am also going to send her to a pain management doctor to see if she is a candidate for selective facet blocks and if appropriate radiofrequency ablations in the lumbar region.  A CAT scan of the cervical spine was done previously that showed significant spondylosis with the sigmoid curvature of her cervical spine.  No one has looked at an MRI of her cervical spine.  She has lost all fine motor coordination in her hands.  She does have severe arthritis but I think she is also showing signs of myelopathy.  I am going to go ahead and order plain x-rays including flexion and extension views and an MRI of her cervical spine.      I have reviewed all prior documentation and reviewed the electronic medical record since admission. I have personally have reviewed all advanced imaging not just the reports and used my interpretation as documented as the relevant findings. I have reviewed the risks and benefits of all treatment recommendations listed in this note with the patient and family. I spent a total of 40 minutes in service to this patient's care during this date of service.      The above clinical summary has been dictated with voice recognition software. It has  not been proofread for grammatical errors, typographical mistakes, or other semantic inconsistencies.    Thank you for visiting our office today. It was our pleasure to take part in your healthcare.     Do not hesitate to call with any questions regarding your plan of care after leaving. My office can be reached at [phone] M-F 8am-4pm.     To clinicians, thank you very much for this kind referral. It is a privilege to partner with you in the care of your patients. My office would be delighted to assist you with any further consultations or with questions regarding the plan of care outlined. Do not hesitate to call the office or contact me directly.     Sincerely,    Claudio Kearney MD, FAANS, FACS  Board Certified Neurological Surgeon  , Department of Neurological Surgery  [employer] [facility] [employer]    [facility]  [address]  Medical [facility] [address]  [place] [address]    [facility]  [address]  [address]  [place] [address]    Phone: [phone]  Fax: [phone]

## 2024-02-13 ENCOUNTER — OFFICE VISIT (OUTPATIENT)
Dept: CARDIOLOGY | Facility: CLINIC | Age: 64
End: 2024-02-13
Payer: MEDICARE

## 2024-02-13 VITALS — HEART RATE: 60 BPM | SYSTOLIC BLOOD PRESSURE: 118 MMHG | DIASTOLIC BLOOD PRESSURE: 70 MMHG

## 2024-02-13 DIAGNOSIS — D64.9 ANEMIA, UNSPECIFIED TYPE: Chronic | ICD-10-CM

## 2024-02-13 DIAGNOSIS — I27.21 SECONDARY PULMONARY ARTERIAL HYPERTENSION (MULTI): ICD-10-CM

## 2024-02-13 DIAGNOSIS — E78.49 OTHER HYPERLIPIDEMIA: Chronic | ICD-10-CM

## 2024-02-13 DIAGNOSIS — J44.9 ADVANCED COPD (MULTI): Chronic | ICD-10-CM

## 2024-02-13 DIAGNOSIS — M32.14 LUPUS NEPHRITIS (MULTI): ICD-10-CM

## 2024-02-13 DIAGNOSIS — I48.0 PAF (PAROXYSMAL ATRIAL FIBRILLATION) (MULTI): Primary | ICD-10-CM

## 2024-02-13 DIAGNOSIS — E87.5 HYPERKALEMIA: ICD-10-CM

## 2024-02-13 DIAGNOSIS — I10 BENIGN ESSENTIAL HTN: Chronic | ICD-10-CM

## 2024-02-13 DIAGNOSIS — N18.31 STAGE 3A CHRONIC KIDNEY DISEASE (MULTI): ICD-10-CM

## 2024-02-13 PROCEDURE — 1036F TOBACCO NON-USER: CPT | Performed by: INTERNAL MEDICINE

## 2024-02-13 PROCEDURE — 99214 OFFICE O/P EST MOD 30 MIN: CPT | Performed by: INTERNAL MEDICINE

## 2024-02-13 PROCEDURE — 3051F HG A1C>EQUAL 7.0%<8.0%: CPT | Performed by: INTERNAL MEDICINE

## 2024-02-13 PROCEDURE — 3008F BODY MASS INDEX DOCD: CPT | Performed by: INTERNAL MEDICINE

## 2024-02-13 PROCEDURE — 3074F SYST BP LT 130 MM HG: CPT | Performed by: INTERNAL MEDICINE

## 2024-02-13 PROCEDURE — 3078F DIAST BP <80 MM HG: CPT | Performed by: INTERNAL MEDICINE

## 2024-02-13 NOTE — PATIENT INSTRUCTIONS
Continue same medications/treatment.  Patient educated on proper medication use.  Patient educated on risk factor modification.  Please bring any lab results from other providers/physicians to your next appointment.    Please bring all medicines, vitamins, and herbal supplements with you when you come to the office.    Prescriptions will not be filled unless you are compliant with your follow up appointments or have a follow up appointment scheduled as per instruction of your physician. Refills should be requested at the time of your visit.    Follow up with Dr. Carole Arenas in 6 months   Referral to Nephrology, Dr. Brock VILLA in one week    ITERE RN, AM SCRIBING FOR AND IN THE PRESENCE OF DR. CAROLE ARENAS M.D., FACC

## 2024-02-13 NOTE — PROGRESS NOTES
Patient:  Bing Holliday  YOB: 1960  MRN: [id number]       HPI:       Bing Holliday is a 63 y.o. female who returns today for cardiac follow-up.  She was seen on March 30, 2022 for evaluation of new onset atrial fibrillation. She does not have any history of atherosclerotic heart or valvular heart disease. She reports that many years ago she developed fluid around her heart and underwent a pericardial window for treatment of pericardial tamponade. She has a history of multiple medical problems including systemic lupus erythematosus and lupus nephritis. She has a history of primary hypertension and type 2 diabetes mellitus. She has obstructive sleep apnea but does not use CPAP. She was told that she has underlying COPD. She had a remote stroke with minimal residual deficits and was hospitalized for a TIA in September 2021.     She was [facility] November 2021 for an episode of viral colitis. Upon arrival to the emergency department she was noted to have underlying atrial fibrillation with rapid ventricular response. She converted spontaneously to normal sinus rhythm. She was initiated on Eliquis 5 mg twice daily as well as Toprol-XL. She was advised at that time to follow-up with cardiology. An echocardiogram on February 21, 2022 showed an estimated LV ejection fraction 50 to 55%. There was mild biatrial enlargement. The right ventricle was mildly dilated with low normal RV systolic function. There was mild mitral and tricuspid regurgitation. Estimated RVSP was severely elevated at 71 mmHg. The IVC was dilated and had poor inspiratory collapse suggesting elevated right atrial pressure.      She was readmitted to [facility] on February 16, 2022 after an accidental fall. She had a large right thigh hematoma and briefly developed hemorrhagic shock requiring pressors and blood transfusion. Hemoglobin was as low as 5.2. She had persistent encephalopathy marked by auditory and visual  hallucinations. 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 She was initiated on steroids. Eliquis was resumed upon discharge on March 4, 2022. She was advised to continue Toprol-XL 25 mg daily.      [place] [facility] [place] with complaints of weakness and shortness of breath. Her blood pressure had been low the previous day. She was noted to have heart rates as low as 40 bpm. She was on Toprol-XL 25 mg daily and that was discontinued. Systolic blood pressures were in the 140s to 160s. Chest x-ray did not show any active disease. CT of the chest was negative for pulmonary embolism. There was cardiomegaly and suggestion of right heart dysfunction. Cardiac enzymes were negative. Influenza and COVID test were negative. Creatinine was 1.4. 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 He recommended that she remain off of Toprol-XL. A follow-up echocardiogram showed mild LV dysfunction with estimated LV ejection fraction 45 to 50%. There is trivial to 1+ mitral regurgitation. No tricuspid regurgitation was documented. RVSP was not able to be measured. Hemoglobin was 10.7. EKG showed normal sinus rhythm, LVH, and inferolateral ST-T wave changes.     She underwent left and right heart cath by Dr. Pacheco on Nasima 10, 2022.  There was 40% stenosis of the mid RCA with otherwise minimal luminal irregularities. LVEDP was 15 mmHg. Pulmonary artery pressure was elevated at 55/15 mmHg. Pulmonary capillary wedge pressure 15-17 mmHg. Cardiac output was 4.9 L/min with a cardiac index of 2.5 L/min/m².  Pulmonary function test on July 12, 2022 showed moderately severe obstructive airway disease and moderately severe restrictive disease. No response to bronchodilators. A 6-minute walk test was done on August 1, 2022. She walked a total distance of 400 feet which was markedly reduced. No evidence of clinically significant oxygen desaturation on room air. She was  referred to Dr. Royce Wallace by Dr. Hines to assess for pulmonary arterial hypertension. Dr. Xiao noted that she has secondary pulmonary hypertension but no evidence of primary pulmonary arterial hypertension. He reported that she does not fulfill the basic minimal definition requiring PVR greater than 3 Woods units and pulmonary capillary wedge pressure less than 15 mmHg.     She was hospitalized January 14, 2024 at [facility] for treatment of adrenal insufficiency and acute encephalopathy.  She has had multiple recurrent hospitalizations secondary to hypoglycemia and mental status changes.  She was re-hospitalized at [facility] from 12/17 - 12/21/23.  She had significant hypernatremia with sodium level of 152.  MRI brain showed new patchy areas of nonspecific demyelination.  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 She was treated with IV Keppra. She was transferred to [facility]. EEG showed severe diffuse encephalopathy. Endocrinology was consulted regarding patient's history of secondary adrenal insufficiency and she was treated to IV solucortef.  Venous duplex ultrasound January 23, delta 24 showed partially occlusive superficial thrombus in the right basilic vein.  No DVT.  Venous ultrasound of the lower extremities January 15, 2024 were negative for DVT.  FENG October 21, 2023 did not show any arterial occlusive disease.    Patient states she has been doing reasonably well since her most recent discharge.  She ambulates with use of a Rollator. She has chronic exertional dyspnea but notes it still is about the same as before. She is following regularly with Dr. Hines. She denies any recent chest pain. She denies any orthopnea or PND.  She has chronic peripheral edema.  She denies any palpitations, lightheadedness, near-syncope, or syncope. She denies any fever, chills, or cough. She denies any nausea, vomiting, or diaphoresis. She denies any hemoptysis, hematemesis, melena, or  "hematochezia. Labs drawn earlier today showed a hemoglobin 7.7 and hematocrit 25.6.  WBC 8.62.  Platelets 202.  Comprehensive metabolic panel file showed a glucose 279, BUN 41, creatinine 1.63, potassium 5.8 and alkaline phosphatase 191.  Hemoglobin A1c on February, 2024 was 8.0.  Iron 95 with TIBC 159 and transferrin saturation 60.  Hemoglobin on January 30, 2024 was 7.5.  She will continue on her same cardiac medications.  Repeat basic metabolic profile in 1 week.  Other details as noted below.     The above portion of this note was dictated by me using voice recognition software. I personally performed the services described in the documentation. The scribe entering the documentation below was in my presence. I affirm that the information is both accurate and complete.       Objective:     Vitals:    02/13/24 1039   BP: 118/70   Pulse: 60       Wt Readings from Last 4 Encounters:   02/08/24 87.5 kg (193 lb)   01/20/24 99.3 kg (218 lb 14.7 oz)   01/17/24 96 kg (211 lb 10.3 oz)   12/26/23 93 kg (205 lb)       Allergies:     Allergies   Allergen Reactions    Ace Inhibitors Swelling, Angioedema, Shortness of breath and Other     'FACIAL TWISTING\" \"LOOKS LIKE I HAD A STROKE IN MY SLEEP\"    Hydroxychloroquine Other, Nausea And Vomiting, Nausea/vomiting and Unknown     RETINAL BLEEDING    RETINAL BLEEDING      RETINAL BLEEDING, Eye problems    Lisinopril Swelling    Penicillins Unknown     tolerates cephalosporins-unknown childhood allergy    Sulfa (Sulfonamide Antibiotics) Hives        Medications:     Current Outpatient Medications   Medication Instructions    acetaminophen (TYLENOL) 975 mg, oral, Every 6 hours PRN    amLODIPine (Norvasc) 2.5 mg tablet 1 tablet, oral, Daily    apixaban (ELIQUIS) 2.5 mg, oral, 2 times daily    atorvastatin (LIPITOR) 40 mg, oral, Daily    belimumab (Benlysta) 400 mg recon soln IV injection 10 mg/kg, intravenous, Every 28 days, (900mg per dose)    blood-glucose sensor (Dexcom G6 Sensor) " device Use to check sugars 3 times daily    Ca carb-D3-mag di-mtj-nkji-Zn 300 mg-20 mcg- 25 mg-0.5 mg tablet 1 tablet, oral, 2 times daily    Dexcom G4 platinum  (Dexcom G6 ) misc Use as instructed    Dexcom G4 platinum transmitter (Dexcom G6 Transmitter) device Use as instructed    fluticasone-umeclidin-vilanter (TRELEGY-ELLIPTA) 200-62.5-25 mcg blister with device 1 puff, inhalation, Daily    folic acid (Folvite) 1 mg tablet TAKE 1 TABLET BY MOUTH EVERY DAY    insulin glargine (LANTUS) 10 Units, subcutaneous, Nightly, Take as directed per insulin instructions.    insulin lispro (HUMALOG) 0-10 Units, subcutaneous, 3 times daily with meals, Take as directed per insulin instructions.    levETIRAcetam (KEPPRA) 500 mg, oral, Every 12 hours    loperamide (IMODIUM) 2 mg, oral, 4 times daily PRN    melatonin 5 mg tablet 1 tablet, oral, Nightly    multivitamin tablet 1 tablet, oral, Daily    mycophenolate (CELLCEPT) 1,000 mg, oral, 2 times daily    oxyCODONE (ROXICODONE) 5 mg, oral, Every 6 hours PRN    pantoprazole (ProtoNix) 40 mg EC tablet TAKE 1 TABLET BY MOUTH EVERY DAY    predniSONE (DELTASONE) 15 mg, oral, Daily    thiamine (VITAMIN B-1) 100 mg, oral, Daily       Physical Examination:   GENERAL:  Well developed, chronically ill-appearing, in no acute distress.  CHEST:  Symmetric and nontender.  NEURO/PSYCH:  Alert and oriented times three with approppriate behavior and responses.  NECK:  Supple, no JVD, no bruit.  LUNGS: Decreased breath sounds bilaterally, normal respiratory effort.  HEART:  Rate and rhythm regular with I/VI SWAPNA LSB, no gallop appreciated.        There are no rubs, clicks or heaves.  EXTREMITIES:  Warm with good color, no clubbing or cyanosis.  There is 2+ bilateral.    Edema noted.  PERIPHERAL VASCULAR:  Pulses present and equally palpable; 2+ throughout.      Lab:     CBC:   Lab Results   Component Value Date    WBC 7.3 01/27/2024    RBC 2.72 (L) 01/27/2024    HGB 7.9 (L)  01/27/2024    HCT 26.2 (L) 01/27/2024     01/27/2024        CMP:    Lab Results   Component Value Date     01/27/2024    K 3.9 01/27/2024     01/27/2024    CO2 28 01/27/2024    BUN 25 (H) 01/27/2024    CREATININE 1.91 (H) 01/27/2024    GLUCOSE 200 (H) 01/27/2024    CALCIUM 8.0 (L) 01/27/2024       Magnesium:    Lab Results   Component Value Date    MG 1.56 (L) 01/27/2024       Lipid Profile:    Lab Results   Component Value Date    TRIG 79 03/24/2023    HDL 70.9 03/24/2023       TSH:    Lab Results   Component Value Date    TSH 0.55 01/16/2024       BNP:   Lab Results   Component Value Date    BNP 98 03/20/2023        PT/INR:    Lab Results   Component Value Date    PROTIME 10.4 01/17/2024    INR 0.9 01/17/2024       HgBA1c:    Lab Results   Component Value Date    HGBA1C 7.1 (H) 01/24/2024       BMP:  Lab Results   Component Value Date     01/27/2024     01/26/2024     01/24/2024    K 3.9 01/27/2024    K 4.0 01/26/2024    K 4.0 01/24/2024     01/27/2024     01/26/2024     01/24/2024    CO2 28 01/27/2024    CO2 27 01/26/2024    CO2 27 01/24/2024    BUN 25 (H) 01/27/2024    BUN 26 (H) 01/26/2024    BUN 24 (H) 01/24/2024    CREATININE 1.91 (H) 01/27/2024    CREATININE 1.77 (H) 01/26/2024    CREATININE 1.91 (H) 01/24/2024       CBC:  Lab Results   Component Value Date    WBC 7.3 01/27/2024    WBC 6.5 01/26/2024    WBC 5.5 01/24/2024    RBC 2.72 (L) 01/27/2024    RBC 2.79 (L) 01/26/2024    RBC 3.09 (L) 01/24/2024    HGB 7.9 (L) 01/27/2024    HGB 8.1 (L) 01/26/2024    HGB 9.0 (L) 01/24/2024    HCT 26.2 (L) 01/27/2024    HCT 27.5 (L) 01/26/2024    HCT 31.3 (L) 01/24/2024    MCV 96 01/27/2024    MCV 99 01/26/2024     (H) 01/24/2024    MCH 29.0 01/27/2024    MCH 29.0 01/26/2024    MCH 29.1 01/24/2024    MCHC 30.2 (L) 01/27/2024    MCHC 29.5 (L) 01/26/2024    MCHC 28.8 (L) 01/24/2024    RDW 18.4 (H) 01/27/2024    RDW 18.6 (H) 01/26/2024    RDW 19.2 (H) 01/24/2024      01/27/2024     01/26/2024    PLT 82 (L) 01/24/2024    MPV 12.0 (H) 10/25/2023    MPV 12.0 (H) 10/24/2023    MPV 13.5 (H) 10/23/2023       Cardiac Enzymes:    Lab Results   Component Value Date    TROPHS 32 (H) 01/14/2024    TROPHS 27 (H) 01/14/2024    TROPHS 12 12/17/2023       Hepatic Function Panel:    Lab Results   Component Value Date    ALKPHOS 93 01/21/2024    ALT 19 01/21/2024    AST 14 01/21/2024    PROT 4.0 (L) 01/21/2024    BILITOT 0.3 01/21/2024    BILIDIR 0.1 01/21/2024       Diagnostic Studies:     ECG 12 lead  Result Date: 1/25/2024    Sinus rhythm with occasional Premature ventricular complexes Possible Left atrial enlargement Left ventricular hypertrophy Nonspecific ST and T wave abnormality Abnormal ECG When compared with ECG of 18-JAN-2024 00:24, No significant change was found Confirmed by Mal Briscoe ([id number]) on 1/25/2024 1:18:50 PM  .      Problem List:     Patient Active Problem List   Diagnosis    COVID-19    Lupus (CMS/HCC)    Ambulatory dysfunction    Myelodysplastic syndrome, unspecified (CMS/HCC)    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HCC)    Hyperlipidemia    Pneumonia of both lower lobes due to infectious organism    Hallucinations    Leg swelling    Hyperglycemia    JED (acute kidney injury) (CMS/HCC)    Hypernatremia    Proliferative diabetic retinopathy of right eye without macular edema determined by examination associated with type 2 diabetes mellitus (CMS/HCC)    Urinary incontinence    Weakness of both lower extremities    Abdominal cramping    Abnormal echocardiogram    Abnormal gait    Abnormal thyroid blood test    Advanced COPD (CMS/HCC)    Afferent pupillary defect of right eye    Anemia    Pancytopenia (CMS/HCC)    Altitudinal scotoma of right eye    Arcuate scotoma of left eye    Arthropathy    Asymptomatic PVCs    Atrial fibrillation (CMS/HCC)    Balance problem    Bilateral high frequency sensorineural hearing loss    Bradycardia    Branch retinal artery occlusion     Cardiomyopathy (CMS/HCC)    Chronic diarrhea    Chronic fatigue    Chronic kidney disease (CKD), stage III (moderate) (CMS/HCC)    CKD stage G3a/A2, GFR 45-59 and albumin creatinine ratio  mg/g (CMS/HCC)    Combined forms of age-related cataract of left eye    Combined forms of age-related cataract of right eye    Contracture of hand    Snoring    CVA (cerebral vascular accident) (CMS/HCC)    Daytime somnolence    Degeneration of lumbar/lumbosacral disc without myelopathy    Depression, major    Dizziness    Dupuytren's contracture    Eczema    Elevated alkaline phosphatase level    Encephalopathy acute    Facial twitching    Fibromyalgia    Fungal nail infection    GERD (gastroesophageal reflux disease)    Gouty arthropathy    H/O iritis    H/O orthostatic hypotension    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HCC)    High level of cardiac marker    Hip hematoma, right    Hypothermia    Insomnia    Leg edema, left    Loss of consciousness (CMS/HCC)    Low blood sugar reading    Lung nodule, multiple    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HCC)    Metabolic encephalopathy    Muscle cramps    Nodule of skin of left lower leg    Obstructive lung disease (CMS/HCC)    Osteoarthritis of left hand    Osteoarthritis of right hand    Osteopenia    Osteoporosis    Palpitations    Peripheral visual field defect    Persistent proteinuria    Positive colorectal cancer screening using Cologuard test    Benign essential HTN    Psychosis (CMS/HCC)    Pulmonary hypertension (CMS/HCC)    Refractive error    Hypertensive retinopathy    Retinal neovascularization    Secondary pulmonary arterial hypertension (CMS/HCC)    Shortness of breath on exertion    Spinal stenosis of lumbar region with neurogenic claudication    Subjective tinnitus of both ears    Swelling of right foot    Syncope and collapse    Thrombophlebitis of superficial veins of left lower extremity    Tinnitus of left ear    Vitamin D deficiency    DM type 2 with diabetic peripheral  neuropathy (CMS/HCC)    Systemic lupus erythematosus (CMS/HCC)    Diabetic nephropathy (CMS/HCC)    Functional diarrhea    UTI (urinary tract infection)    Moderate protein-calorie malnutrition (CMS/HCC)    Shock, unspecified (CMS/HCC)    Adrenal insufficiency (CMS/HCC)    Seizure-like activity (CMS/HCC)    Meningitis    Encephalopathy    Seizure (CMS/HCC)    Uncontrolled blood glucose    Cervical spondylosis with myelopathy       Asessment:     Problem List Items Addressed This Visit             ICD-10-CM    Hyperlipidemia (Chronic) E78.5    Advanced COPD (CMS/HCC) (Chronic) J44.9    Anemia (Chronic) D64.9    PAF (paroxysmal atrial fibrillation) (CMS/HCC) - Primary I48.0    Chronic kidney disease (CKD), stage III (moderate) (CMS/HCC) N18.30    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HCC) M32.14    Benign essential HTN (Chronic) I10    Secondary pulmonary arterial hypertension (CMS/HCC) I27.21    Hyperkalemia E87.5    Relevant Orders    Basic Metabolic Panel

## 2024-02-14 ENCOUNTER — NURSING HOME VISIT (OUTPATIENT)
Dept: POST ACUTE CARE | Facility: EXTERNAL LOCATION | Age: 64
End: 2024-02-14
Payer: MEDICARE

## 2024-02-14 ENCOUNTER — TELEPHONE (OUTPATIENT)
Dept: PRIMARY CARE | Facility: CLINIC | Age: 64
End: 2024-02-14
Payer: MEDICARE

## 2024-02-14 ENCOUNTER — HOSPITAL ENCOUNTER (EMERGENCY)
Facility: HOSPITAL | Age: 64
Discharge: HOME | End: 2024-02-14
Attending: EMERGENCY MEDICINE
Payer: MEDICARE

## 2024-02-14 ENCOUNTER — APPOINTMENT (OUTPATIENT)
Dept: CARDIOLOGY | Facility: HOSPITAL | Age: 64
End: 2024-02-14
Payer: MEDICARE

## 2024-02-14 VITALS
RESPIRATION RATE: 15 BRPM | HEIGHT: 70 IN | BODY MASS INDEX: 27.2 KG/M2 | HEART RATE: 60 BPM | OXYGEN SATURATION: 98 % | WEIGHT: 190 LBS | DIASTOLIC BLOOD PRESSURE: 66 MMHG | SYSTOLIC BLOOD PRESSURE: 161 MMHG | TEMPERATURE: 97.3 F

## 2024-02-14 DIAGNOSIS — N18.31 CKD STAGE G3A/A2, GFR 45-59 AND ALBUMIN CREATININE RATIO 30-299 MG/G (MULTI): Chronic | ICD-10-CM

## 2024-02-14 DIAGNOSIS — N17.9 AKI (ACUTE KIDNEY INJURY) (CMS-HCC): ICD-10-CM

## 2024-02-14 DIAGNOSIS — E27.40 ADRENAL INSUFFICIENCY (MULTI): ICD-10-CM

## 2024-02-14 DIAGNOSIS — E78.49 OTHER HYPERLIPIDEMIA: Primary | Chronic | ICD-10-CM

## 2024-02-14 DIAGNOSIS — I27.20 PULMONARY HYPERTENSION (MULTI): Chronic | ICD-10-CM

## 2024-02-14 DIAGNOSIS — E87.5 HYPERKALEMIA: ICD-10-CM

## 2024-02-14 DIAGNOSIS — E87.5 HYPERKALEMIA: Primary | ICD-10-CM

## 2024-02-14 DIAGNOSIS — E11.42 DM TYPE 2 WITH DIABETIC PERIPHERAL NEUROPATHY (MULTI): Chronic | ICD-10-CM

## 2024-02-14 LAB
ALBUMIN SERPL BCP-MCNC: 2.9 G/DL (ref 3.4–5)
ALP SERPL-CCNC: 143 U/L (ref 33–136)
ALT SERPL W P-5'-P-CCNC: 17 U/L (ref 7–45)
ANION GAP SERPL CALC-SCNC: 11 MMOL/L (ref 10–20)
AST SERPL W P-5'-P-CCNC: 11 U/L (ref 9–39)
BASOPHILS # BLD AUTO: 0.01 X10*3/UL (ref 0–0.1)
BASOPHILS NFR BLD AUTO: 0.1 %
BILIRUB SERPL-MCNC: 0.3 MG/DL (ref 0–1.2)
BUN SERPL-MCNC: 45 MG/DL (ref 6–23)
CALCIUM SERPL-MCNC: 8.1 MG/DL (ref 8.6–10.3)
CHLORIDE SERPL-SCNC: 111 MMOL/L (ref 98–107)
CO2 SERPL-SCNC: 27 MMOL/L (ref 21–32)
CREAT SERPL-MCNC: 1.77 MG/DL (ref 0.5–1.05)
EGFRCR SERPLBLD CKD-EPI 2021: 32 ML/MIN/1.73M*2
EOSINOPHIL # BLD AUTO: 0.01 X10*3/UL (ref 0–0.7)
EOSINOPHIL NFR BLD AUTO: 0.1 %
ERYTHROCYTE [DISTWIDTH] IN BLOOD BY AUTOMATED COUNT: 19.8 % (ref 11.5–14.5)
GLUCOSE SERPL-MCNC: 149 MG/DL (ref 74–99)
HCT VFR BLD AUTO: 26 % (ref 36–46)
HGB BLD-MCNC: 7.4 G/DL (ref 12–16)
IMM GRANULOCYTES # BLD AUTO: 0.21 X10*3/UL (ref 0–0.7)
IMM GRANULOCYTES NFR BLD AUTO: 2.2 % (ref 0–0.9)
LYMPHOCYTES # BLD AUTO: 1.36 X10*3/UL (ref 1.2–4.8)
LYMPHOCYTES NFR BLD AUTO: 14.1 %
MCH RBC QN AUTO: 29 PG (ref 26–34)
MCHC RBC AUTO-ENTMCNC: 28.5 G/DL (ref 32–36)
MCV RBC AUTO: 102 FL (ref 80–100)
MONOCYTES # BLD AUTO: 0.4 X10*3/UL (ref 0.1–1)
MONOCYTES NFR BLD AUTO: 4.1 %
NEUTROPHILS # BLD AUTO: 7.66 X10*3/UL (ref 1.2–7.7)
NEUTROPHILS NFR BLD AUTO: 79.4 %
NRBC BLD-RTO: 0.5 /100 WBCS (ref 0–0)
PLATELET # BLD AUTO: 189 X10*3/UL (ref 150–450)
POTASSIUM SERPL-SCNC: 4.4 MMOL/L (ref 3.5–5.3)
PROT SERPL-MCNC: 6 G/DL (ref 6.4–8.2)
RBC # BLD AUTO: 2.55 X10*6/UL (ref 4–5.2)
SODIUM SERPL-SCNC: 145 MMOL/L (ref 136–145)
WBC # BLD AUTO: 9.7 X10*3/UL (ref 4.4–11.3)

## 2024-02-14 PROCEDURE — 99309 SBSQ NF CARE MODERATE MDM 30: CPT | Performed by: STUDENT IN AN ORGANIZED HEALTH CARE EDUCATION/TRAINING PROGRAM

## 2024-02-14 PROCEDURE — 99284 EMERGENCY DEPT VISIT MOD MDM: CPT | Performed by: EMERGENCY MEDICINE

## 2024-02-14 PROCEDURE — 99283 EMERGENCY DEPT VISIT LOW MDM: CPT | Mod: 25

## 2024-02-14 PROCEDURE — 80053 COMPREHEN METABOLIC PANEL: CPT | Performed by: EMERGENCY MEDICINE

## 2024-02-14 PROCEDURE — 36415 COLL VENOUS BLD VENIPUNCTURE: CPT | Performed by: EMERGENCY MEDICINE

## 2024-02-14 PROCEDURE — 85025 COMPLETE CBC W/AUTO DIFF WBC: CPT | Performed by: EMERGENCY MEDICINE

## 2024-02-14 PROCEDURE — 93005 ELECTROCARDIOGRAM TRACING: CPT

## 2024-02-14 PROCEDURE — 99284 EMERGENCY DEPT VISIT MOD MDM: CPT | Mod: 25 | Performed by: EMERGENCY MEDICINE

## 2024-02-14 PROCEDURE — 96360 HYDRATION IV INFUSION INIT: CPT

## 2024-02-14 PROCEDURE — 2500000004 HC RX 250 GENERAL PHARMACY W/ HCPCS (ALT 636 FOR OP/ED): Performed by: EMERGENCY MEDICINE

## 2024-02-14 RX ADMIN — SODIUM CHLORIDE, POTASSIUM CHLORIDE, SODIUM LACTATE AND CALCIUM CHLORIDE 1000 ML: 600; 310; 30; 20 INJECTION, SOLUTION INTRAVENOUS at 12:13

## 2024-02-14 ASSESSMENT — PAIN - FUNCTIONAL ASSESSMENT
PAIN_FUNCTIONAL_ASSESSMENT: 0-10
PAIN_FUNCTIONAL_ASSESSMENT: 0-10

## 2024-02-14 ASSESSMENT — PAIN SCALES - GENERAL
PAINLEVEL_OUTOF10: 0 - NO PAIN

## 2024-02-14 ASSESSMENT — LIFESTYLE VARIABLES
HAVE YOU EVER FELT YOU SHOULD CUT DOWN ON YOUR DRINKING: NO
EVER FELT BAD OR GUILTY ABOUT YOUR DRINKING: NO
EVER HAD A DRINK FIRST THING IN THE MORNING TO STEADY YOUR NERVES TO GET RID OF A HANGOVER: NO
HAVE PEOPLE ANNOYED YOU BY CRITICIZING YOUR DRINKING: NO

## 2024-02-14 ASSESSMENT — COLUMBIA-SUICIDE SEVERITY RATING SCALE - C-SSRS
1. IN THE PAST MONTH, HAVE YOU WISHED YOU WERE DEAD OR WISHED YOU COULD GO TO SLEEP AND NOT WAKE UP?: NO
2. HAVE YOU ACTUALLY HAD ANY THOUGHTS OF KILLING YOURSELF?: NO
6. HAVE YOU EVER DONE ANYTHING, STARTED TO DO ANYTHING, OR PREPARED TO DO ANYTHING TO END YOUR LIFE?: NO

## 2024-02-14 NOTE — DISCHARGE INSTRUCTIONS
The high potassium level you were sent into the ED for has resolved.  However on today's labs, there was low normal hemoglobin noted.  Please follow-up with your PCP within 1 week.  If you develop any chest pain, shortness of breath, nausea, vomiting, palpitations, headache, change in vision, or weakness please return to closest ED.

## 2024-02-14 NOTE — ED PROVIDER NOTES
HPI   Chief Complaint   Patient presents with    Potassium 6.2     Labs at [facility] yesterday k WAS 5.8. kAYEXLATE given 2/13 at 2000 and 2/14 0001. This mornings labs resulted in K 6.2. pt has no complaints       Patient is a 63-year-old female with known history of CKD, adrenal insufficiency, DM, diabetic nephropathy presenting to [facility] ED for elevated potassium.  Patient was at [facility] facility yesterday, and had potassium of 5.8.  Patient given Kayexalate yesterday at 10 PM and again at midnight.  Patient repeat labs showed potassium of 6.2 this morning.  Patient referred to ED for further evaluation and management.  Patient denies any active chest pain, heart racing, palpitations, shortness of breath, nausea, vomiting, diarrhea, abdominal pain, difficulty urinating/defecating, fever, or chills.                          No data recorded                   Patient History   Past Medical History:   Diagnosis Date    Acute upper respiratory infection, unspecified 03/04/2020    Acute URI    Acute upper respiratory infection, unspecified 09/30/2015    URTI (acute upper respiratory infection)    Arthritis     Body mass index (BMI) 23.0-23.9, adult 10/15/2021    BMI 23.0-23.9, adult    Body mass index (BMI) 33.0-33.9, adult 03/04/2020    BMI 33.0-33.9,adult    Cardiomegaly 08/27/2013    Left ventricular hypertrophy    Chronic kidney disease, stage 3 unspecified (CMS/[facility]) 07/02/2013    Chronic kidney disease, stage III (moderate)    Disease of pericardium, unspecified 07/02/2013    Pericardial disease    Encounter for follow-up examination after completed treatment for conditions other than malignant neoplasm 10/06/2022    Hospital discharge follow-up    Generalized contraction of visual field, right eye 01/29/2015    Generalized contraction of visual field of right eye    Homonymous bilateral field defects, right side 04/29/2016    Homonymous bilateral field defects of right side    Hypertensive chronic kidney disease with  stage 1 through stage 4 chronic kidney disease, or unspecified chronic kidney disease 07/02/2013    Nephrosclerosis    Laceration without foreign body, left foot, initial encounter 07/03/2018    Foot laceration, left, initial encounter    Migraine with aura, not intractable, without status migrainosus 10/24/2022    Ocular migraine    Other conditions influencing health status 07/02/2013    Chronic Glomerulonephritis In Diseases Classified Elsewhere    Other conditions influencing health status 07/02/2013    Progressive Familial Myoclonic Epilepsy    Other conditions influencing health status 07/02/2013    Protein S Deficiency    Other conditions influencing health status 05/22/2015    Familial Combined Hyperlipidemia    Other conditions influencing health status 10/24/2022    IDDM (insulin dependent diabetes mellitus)    Other conditions influencing health status 03/14/2022    Diabetes mellitus, insulin dependent (IDDM), uncontrolled    Other long term (current) drug therapy 10/24/2022    Long-term use of Plaquenil    Personal history of diseases of the blood and blood-forming organs and certain disorders involving the immune mechanism 07/02/2013    History of thrombocytopenia    Personal history of diseases of the skin and subcutaneous tissue 08/11/2015    History of foot ulcer    Personal history of nephrotic syndrome 07/02/2013    History of nephrotic syndrome    Personal history of other diseases of the circulatory system 08/27/2013    History of sinus tachycardia    Personal history of other diseases of the nervous system and sense organs     History of cataract    Personal history of other diseases of the respiratory system     History of bronchitis    Personal history of other infectious and parasitic diseases 07/02/2013    History of hepatitis    Personal history of other specified conditions     History of shortness of breath    Personal history of other specified conditions 08/27/2013    History of edema     Puckering of macula, right eye 10/24/2022    ERM OD (epiretinal membrane, right eye)    Raynaud's syndrome without gangrene 07/02/2013    Raynaud's disease    Systemic lupus erythematosus, unspecified (CMS/HCC) 07/24/2015    SLE (systemic lupus erythematosus)    Systemic lupus erythematosus, unspecified (CMS/HCC) 07/24/2015    SLE (systemic lupus erythematosus)    Systemic lupus erythematosus, unspecified (CMS/HCC) 07/24/2015    Systemic lupus    Type 2 diabetes mellitus with diabetic nephropathy (CMS/HCC) 07/02/2013    Type 2 diabetes with nephropathy    Type 2 diabetes mellitus with mild nonproliferative diabetic retinopathy without macular edema, left eye (CMS/HCC) 07/27/2015    Non-proliferative diabetic retinopathy, left eye    Type 2 diabetes mellitus with mild nonproliferative diabetic retinopathy without macular edema, unspecified eye (CMS/HCC) 07/24/2015    Mild non proliferative diabetic retinopathy    Type 2 diabetes mellitus with proliferative diabetic retinopathy without macular edema, right eye (CMS/HCC) 07/27/2015    Proliferative diabetic retinopathy of right eye    Type 2 diabetes mellitus with proliferative diabetic retinopathy without macular edema, unspecified eye (CMS/HCC) 07/24/2015    Diabetic proliferative retinopathy    Unspecified acute and subacute iridocyclitis 07/24/2015    Acute iritis, right eye    Unspecified open wound, left foot, sequela 07/03/2018    Wound, open, foot, left, sequela     Past Surgical History:   Procedure Laterality Date    ANKLE SURGERY  01/29/2015    Ankle Surgery    CHOLECYSTECTOMY  01/29/2015    Cholecystectomy    CT GUIDED PERCUTANEOUS BIOPSY BONE DEEP  5/4/2021    CT GUIDED PERCUTANEOUS BIOPSY BONE DEEP 5/4/2021 [facility] CLINICAL LEGACY    EYE SURGERY  03/06/2015    Eye Surgery    FOOT SURGERY  01/29/2015    Foot Surgery    MR HEAD ANGIO WO IV CONTRAST  7/26/2013    MR HEAD ANGIO WO IV CONTRAST 7/26/2013 [facility] CLINICAL LEGACY    MR HEAD ANGIO WO IV CONTRAST   9/17/2021    MR HEAD ANGIO WO IV CONTRAST 9/17/2021 [facility]GENCY LEGAC[facility]    MR HEAD ANGIO WO IV CONTRAST  3/25/2023    MR HEAD ANGIO WO IV CONTRAST [facility]    MR NECK ANGIO WO IV CONTRAST  7/26/2013    MR NECK ANGIO WO IV CONTRAST 7/26/2013 [facility] CLINICAL LEGACY    MR NECK ANGIO WO IV CONTRAST  9/17/2021    MR NECK ANGIO WO IV CONTRAST 9/17/2021 [facility]    MR NECK ANGIO WO IV CONTRAST  3/25/2023    MR NECK ANGIO WO IV CONTRAST [facility]    OTHER SURGICAL HISTORY  01/29/2015    Creation Of Pericardial Window    OTHER SURGICAL HISTORY  01/29/2015    Quadricepsplasty    TOTAL HIP ARTHROPLASTY  01/29/2015    Hip Replacement     Family History   Problem Relation Name Age of Onset    Other (RENAL DISEASE) Mother          END STAGE    Other (CARDIAC DISORDER) Mother      Cataracts Mother      Stroke Mother      Diabetes Mother      Kidney disease Mother      Lupus Mother      Other (CARDIAC DISORDER) Father      COPD Father      Glaucoma Father      Hypertension Father      Sleep apnea Father      Other (CARDIAC DISORDER) Sister      Depression Sister      Kidney disease Sister      Sickle cell trait Sister      Sleep apnea Daughter       Social History     Tobacco Use    Smoking status: Never     Passive exposure: Never    Smokeless tobacco: Never   Vaping Use    Vaping Use: Never used   Substance Use Topics    Alcohol use: Not Currently     Comment: RARE    Drug use: Never       Physical Exam   ED Triage Vitals [02/14/24 1126]   Temperature Heart Rate Respirations BP   36.3 °C (97.3 °F) 62 18 140/63      Pulse Ox Temp Source Heart Rate Source Patient Position   99 % Tympanic Monitor Sitting      BP Location FiO2 (%)     Right arm --       Physical Exam  Constitutional:       Appearance: Normal appearance. She is normal weight.   HENT:      Head: Normocephalic and atraumatic.      Nose: Nose normal.      Mouth/Throat:      Mouth: Mucous membranes are moist.      Pharynx: Oropharynx is clear.   Eyes:       Extraocular Movements: Extraocular movements intact.      Conjunctiva/sclera: Conjunctivae normal.      Pupils: Pupils are equal, round, and reactive to light.   Cardiovascular:      Rate and Rhythm: Normal rate and regular rhythm.      Pulses: Normal pulses.      Heart sounds: Normal heart sounds.   Pulmonary:      Effort: Pulmonary effort is normal.      Breath sounds: Normal breath sounds.   Abdominal:      General: Abdomen is flat. Bowel sounds are normal.      Palpations: Abdomen is soft.   Musculoskeletal:         General: Normal range of motion.      Cervical back: Normal range of motion and neck supple.      Comments: Dupuytren's contractures of left and right hands.   Skin:     General: Skin is warm and dry.      Capillary Refill: Capillary refill takes less than 2 seconds.   Neurological:      General: No focal deficit present.      Mental Status: She is alert and oriented to person, place, and time. Mental status is at baseline.   Psychiatric:         Mood and Affect: Mood normal.         Behavior: Behavior normal.         ED Course & MDM   Diagnoses as of 02/14/24 1256   Hyperkalemia       Medical Decision Making  Patient is a 63 y.o. female who presents to [facility] ED for Potassium 6.2 (Labs at [facility] yesterday k WAS 5.8. kAYEXLATE given 2/13 at 2000 and 2/14 0001. This mornings labs resulted in K 6.2. pt has no complaints). On initial ED evaluation, patient found to be in no acute distress. Per HPI, concern to evaluate and treat for hyperkalemia.  Obtaining repeat labs including CBC, CMP, EKG. CMP showed potassium of 4.4.  CBC did show hemoglobin of 7.4 which is low baseline for this patient.  Advised patient to follow-up with PCP outpatient within 1 week.  Patient disposition, to be discharged back to [facility].    Patient to follow up with PCP outpatient. Anticipatory guidance and return precautions provided.  Patient otherwise stable for discharge.          Procedure  Procedures     Sarika Grider  MD  Resident  02/14/24 [id number]       Sarika Grider MD  Resident  02/14/24 [id number]

## 2024-02-14 NOTE — LETTER
Patient: Bing Holliday  : 1960    Encounter Date: 2024    Subjective  Patient ID: Bing Holliday is a 63 y.o. female.    Patient seen and examined at bedside.  She regards that she is overall doing well, however the past couple days, her potassium levels have been noted to be increasing.  Overnight, she was given doses of Kayexalate and was also started on Lokelma.  Did go to her orthopedic office today, however lab recently called and noted that patient had elevated potassium levels of 6.2 despite treatment overnight.  It was verified that this was not hemolyzed.  Patient currently asymptomatic with regards to this, however was updated and is agreeable to be evaluated in the emergency room.  Otherwise, she states no additional issues.        Review of Systems   Constitutional: Negative.    HENT: Negative.     Respiratory: Negative.     Cardiovascular: Negative.    Gastrointestinal: Negative.    Musculoskeletal: Negative.    Neurological: Negative.    Psychiatric/Behavioral: Negative.         ObjectiveVitals Reviewed via facility EMR   Physical Exam  Constitutional:       General: She is not in acute distress.     Appearance: She is not ill-appearing.   Eyes:      Pupils: Pupils are equal, round, and reactive to light.   Cardiovascular:      Rate and Rhythm: Normal rate and regular rhythm.      Pulses: Normal pulses.      Heart sounds: No murmur heard.  Pulmonary:      Effort: No respiratory distress.      Breath sounds: No wheezing.   Abdominal:      General: Abdomen is flat. Bowel sounds are normal. There is no distension.   Musculoskeletal:      Right lower leg: No edema.      Left lower leg: No edema.   Skin:     General: Skin is warm and dry.   Neurological:      Mental Status: She is alert. Mental status is at baseline.      Cranial Nerves: No cranial nerve deficit.      Motor: No weakness.   Psychiatric:         Mood and Affect: Mood normal.         Behavior: Behavior normal.          Assessment/Plan  Diagnoses and all orders for this visit:  Other hyperlipidemia  Pulmonary hypertension (CMS/HCC)  Adrenal insufficiency (CMS/[facility])  DM type 2 with diabetic peripheral neuropathy (CMS/[facility])  JED (acute kidney injury) (CMS/[facility])  CKD stage G3a/A2, GFR 45-59 and albumin creatinine ratio  mg/g (CMS/[facility])  Hyperkalemia    Patient seen and examined, as per HPI, noted to have significant hyperkalemia despite aggressive treatment overnight.  Recommend evaluation in the emergency room.  Patient agreeable.  Updated ED with lab work so that they are aware of critical values as well.  Otherwise, if patient's potassium is noted to be in the lab error, continue with PT OT.  Supportive care.  Will need to titrate patient's blood sugars due to hyperglycemia.    Reviewed and approved by AYAAN DOHERTY on 2/17/24 at 9:05 PM.       Electronically Signed By: Ayaan Doherty DO   2/17/24  9:05 PM

## 2024-02-14 NOTE — ED NOTES
Update SNF on lab results and patient planned return at this time.      Jeanine Kline RN  02/14/24 [id number]

## 2024-02-15 LAB
ATRIAL RATE: 49 BPM
P AXIS: 49 DEGREES
P OFFSET: 196 MS
P ONSET: 144 MS
PR INTERVAL: 152 MS
Q ONSET: 220 MS
QRS COUNT: 8 BEATS
QRS DURATION: 98 MS
QT INTERVAL: 454 MS
QTC CALCULATION(BAZETT): 410 MS
QTC FREDERICIA: 424 MS
R AXIS: 1 DEGREES
T AXIS: -20 DEGREES
T OFFSET: 447 MS
VENTRICULAR RATE: 49 BPM

## 2024-02-16 ENCOUNTER — NURSING HOME VISIT (OUTPATIENT)
Dept: POST ACUTE CARE | Facility: EXTERNAL LOCATION | Age: 64
End: 2024-02-16
Payer: MEDICARE

## 2024-02-16 DIAGNOSIS — E11.42 DM TYPE 2 WITH DIABETIC PERIPHERAL NEUROPATHY (MULTI): Chronic | ICD-10-CM

## 2024-02-16 DIAGNOSIS — R73.9 HYPERGLYCEMIA: ICD-10-CM

## 2024-02-16 DIAGNOSIS — E87.5 HYPERKALEMIA: ICD-10-CM

## 2024-02-16 DIAGNOSIS — N18.31 CKD STAGE G3A/A2, GFR 45-59 AND ALBUMIN CREATININE RATIO 30-299 MG/G (MULTI): Chronic | ICD-10-CM

## 2024-02-16 DIAGNOSIS — E44.0 MODERATE PROTEIN-CALORIE MALNUTRITION (MULTI): Primary | ICD-10-CM

## 2024-02-16 PROCEDURE — 99308 SBSQ NF CARE LOW MDM 20: CPT | Performed by: STUDENT IN AN ORGANIZED HEALTH CARE EDUCATION/TRAINING PROGRAM

## 2024-02-17 ASSESSMENT — ENCOUNTER SYMPTOMS
PSYCHIATRIC NEGATIVE: 1
GASTROINTESTINAL NEGATIVE: 1
RESPIRATORY NEGATIVE: 1
NEUROLOGICAL NEGATIVE: 1
CARDIOVASCULAR NEGATIVE: 1
MUSCULOSKELETAL NEGATIVE: 1
CONSTITUTIONAL NEGATIVE: 1

## 2024-02-18 ASSESSMENT — ENCOUNTER SYMPTOMS
GASTROINTESTINAL NEGATIVE: 1
MUSCULOSKELETAL NEGATIVE: 1
CONSTITUTIONAL NEGATIVE: 1
RESPIRATORY NEGATIVE: 1
CARDIOVASCULAR NEGATIVE: 1
PSYCHIATRIC NEGATIVE: 1
NEUROLOGICAL NEGATIVE: 1

## 2024-02-18 NOTE — PROGRESS NOTES
Subjective   Patient ID: Bing Holliday is a 63 y.o. female.    Patient seen and evaluated at bedside.  She was evaluated in the emergency room, however repeat labs noted to show a potassium level that was normal.  This was likely a lab error.  Otherwise, patient states that she is overall feeling well.  Is overall benefiting from physical therapy.  States no acute issues or concerns at this time.        Review of Systems   Constitutional: Negative.    HENT: Negative.     Respiratory: Negative.     Cardiovascular: Negative.    Gastrointestinal: Negative.    Musculoskeletal: Negative.    Neurological: Negative.    Psychiatric/Behavioral: Negative.         Objective Vitals Reviewed via facility EMR   Physical Exam  Constitutional:       General: She is not in acute distress.     Appearance: She is not ill-appearing.      Comments: Pleasant, NAD   Eyes:      Pupils: Pupils are equal, round, and reactive to light.   Cardiovascular:      Rate and Rhythm: Normal rate and regular rhythm.      Pulses: Normal pulses.      Heart sounds: No murmur heard.  Pulmonary:      Effort: No respiratory distress.      Breath sounds: No wheezing.   Abdominal:      General: Abdomen is flat. Bowel sounds are normal. There is no distension.   Musculoskeletal:      Right lower leg: No edema.      Left lower leg: No edema.   Skin:     General: Skin is warm and dry.   Neurological:      Mental Status: She is alert. Mental status is at baseline.      Cranial Nerves: No cranial nerve deficit.      Motor: No weakness.   Psychiatric:         Mood and Affect: Mood normal.         Behavior: Behavior normal.         Assessment/Plan   Diagnoses and all orders for this visit:  Moderate protein-calorie malnutrition (CMS/HCC)  Hyperglycemia  DM type 2 with diabetic peripheral neuropathy (CMS/HCC)  CKD stage G3a/A2, GFR 45-59 and albumin creatinine ratio  mg/g (CMS/HCC)  Hyperkalemia    Patient seen and examined at bedside.  Regards that she is  doing overall well, review of labs show that patient is frequently hyperglycemic.  Will continue to uptitrate patient's Lantus due to blood sugars averaging in the high 200s.  Continue supportive care.  PT OT.  Monitor weekly labs.  Will avoid medications that increase potassium levels.    Reviewed and approved by MAURO DOHERTY on 2/18/24 at 2:49 PM.

## 2024-02-18 NOTE — PROGRESS NOTES
Subjective   Patient ID: Bing Holliday is a 63 y.o. female.    Patient seen and examined at bedside.  She regards that she is overall doing well, however the past couple days, her potassium levels have been noted to be increasing.  Overnight, she was given doses of Kayexalate and was also started on Lokelma.  Did go to her orthopedic office today, however lab recently called and noted that patient had elevated potassium levels of 6.2 despite treatment overnight.  It was verified that this was not hemolyzed.  Patient currently asymptomatic with regards to this, however was updated and is agreeable to be evaluated in the emergency room.  Otherwise, she states no additional issues.        Review of Systems   Constitutional: Negative.    HENT: Negative.     Respiratory: Negative.     Cardiovascular: Negative.    Gastrointestinal: Negative.    Musculoskeletal: Negative.    Neurological: Negative.    Psychiatric/Behavioral: Negative.         Objective Vitals Reviewed via facility EMR   Physical Exam  Constitutional:       General: She is not in acute distress.     Appearance: She is not ill-appearing.   Eyes:      Pupils: Pupils are equal, round, and reactive to light.   Cardiovascular:      Rate and Rhythm: Normal rate and regular rhythm.      Pulses: Normal pulses.      Heart sounds: No murmur heard.  Pulmonary:      Effort: No respiratory distress.      Breath sounds: No wheezing.   Abdominal:      General: Abdomen is flat. Bowel sounds are normal. There is no distension.   Musculoskeletal:      Right lower leg: No edema.      Left lower leg: No edema.   Skin:     General: Skin is warm and dry.   Neurological:      Mental Status: She is alert. Mental status is at baseline.      Cranial Nerves: No cranial nerve deficit.      Motor: No weakness.   Psychiatric:         Mood and Affect: Mood normal.         Behavior: Behavior normal.         Assessment/Plan   Diagnoses and all orders for this visit:  Other  hyperlipidemia  Pulmonary hypertension (CMS/HCC)  Adrenal insufficiency (CMS/HCC)  DM type 2 with diabetic peripheral neuropathy (CMS/HCC)  JED (acute kidney injury) (CMS/HCC)  CKD stage G3a/A2, GFR 45-59 and albumin creatinine ratio  mg/g (CMS/HCC)  Hyperkalemia    Patient seen and examined, as per HPI, noted to have significant hyperkalemia despite aggressive treatment overnight.  Recommend evaluation in the emergency room.  Patient agreeable.  Updated ED with lab work so that they are aware of critical values as well.  Otherwise, if patient's potassium is noted to be in the lab error, continue with PT OT.  Supportive care.  Will need to titrate patient's blood sugars due to hyperglycemia.    Reviewed and approved by MAURO DOHERTY on 2/17/24 at 9:05 PM.

## 2024-02-21 ENCOUNTER — NURSING HOME VISIT (OUTPATIENT)
Dept: PRIMARY CARE | Facility: CLINIC | Age: 64
End: 2024-02-21
Payer: MEDICARE

## 2024-02-21 DIAGNOSIS — E44.0 MODERATE PROTEIN-CALORIE MALNUTRITION (MULTI): ICD-10-CM

## 2024-02-21 DIAGNOSIS — E11.42 DM TYPE 2 WITH DIABETIC PERIPHERAL NEUROPATHY (MULTI): Primary | Chronic | ICD-10-CM

## 2024-02-21 DIAGNOSIS — K21.9 GASTROESOPHAGEAL REFLUX DISEASE WITHOUT ESOPHAGITIS: Chronic | ICD-10-CM

## 2024-02-21 DIAGNOSIS — N18.31 STAGE 3A CHRONIC KIDNEY DISEASE (MULTI): ICD-10-CM

## 2024-02-21 DIAGNOSIS — Z71.89 ENCOUNTER FOR DIABETES EDUCATION: ICD-10-CM

## 2024-02-21 PROCEDURE — 99308 SBSQ NF CARE LOW MDM 20: CPT | Performed by: STUDENT IN AN ORGANIZED HEALTH CARE EDUCATION/TRAINING PROGRAM

## 2024-02-22 PROBLEM — Z71.89 ENCOUNTER FOR DIABETES EDUCATION: Status: ACTIVE | Noted: 2024-02-22

## 2024-02-22 ASSESSMENT — ENCOUNTER SYMPTOMS
CARDIOVASCULAR NEGATIVE: 1
RESPIRATORY NEGATIVE: 1
GASTROINTESTINAL NEGATIVE: 1
CONSTITUTIONAL NEGATIVE: 1
PSYCHIATRIC NEGATIVE: 1
NEUROLOGICAL NEGATIVE: 1
MUSCULOSKELETAL NEGATIVE: 1

## 2024-02-23 NOTE — PROGRESS NOTES
Subjective   Patient ID: Bing Holliday is a 63 y.o. female.    Patient seen and evaluated at bedside.  He is overall doing well, and is hopeful for discharge in the coming days.  Otherwise, has been tolerating her medications.  She regards that she does not have diabetic supplies at home.  Will need these moving forward.  In addition, has never dosed herself for diabetes as well.  She is familiar with the medication from the hospital, otherwise states that she is overall doing well with no complaints.          Review of Systems   Constitutional: Negative.    HENT: Negative.     Respiratory: Negative.     Cardiovascular: Negative.    Gastrointestinal: Negative.    Musculoskeletal: Negative.    Neurological: Negative.    Psychiatric/Behavioral: Negative.         Objective Vitals Reviewed via facility EMR   Physical Exam  Constitutional:       General: She is not in acute distress.     Appearance: She is not ill-appearing.   Eyes:      Pupils: Pupils are equal, round, and reactive to light.   Cardiovascular:      Rate and Rhythm: Normal rate and regular rhythm.      Pulses: Normal pulses.      Heart sounds: No murmur heard.  Pulmonary:      Effort: No respiratory distress.      Breath sounds: No wheezing.   Abdominal:      General: Abdomen is flat. Bowel sounds are normal. There is no distension.   Musculoskeletal:      Right lower leg: No edema.      Left lower leg: No edema.   Skin:     General: Skin is warm and dry.   Neurological:      Mental Status: She is alert. Mental status is at baseline.      Cranial Nerves: No cranial nerve deficit.      Motor: No weakness.   Psychiatric:         Mood and Affect: Mood normal.         Behavior: Behavior normal.         Assessment/Plan   Diagnoses and all orders for this visit:  DM type 2 with diabetic peripheral neuropathy (CMS/HCC)  Moderate protein-calorie malnutrition (CMS/HCC)  Gastroesophageal reflux disease without esophagitis  Stage 3a chronic kidney disease  (CMS/[facility])  Encounter for diabetes education      Patient seen and examined.  Overall medically stable at this time.  Tolerating current medications, discussed with Patient that she will need Diabetic medication after discharge from facility.  Will have staff educate the patient with regards to dosing and treatment of diabetes.  Recommend close follow-up with PCP.  Will send his medications to the pharmacy, she is agreeable with this plan.  Discussed with CRISTINA.    Reviewed and approved by MAURO DOHERTY on 2/22/24 at 9:17 PM.

## 2024-02-26 ENCOUNTER — PATIENT OUTREACH (OUTPATIENT)
Dept: PRIMARY CARE | Facility: CLINIC | Age: 64
End: 2024-02-26
Payer: MEDICARE

## 2024-02-26 DIAGNOSIS — R56.9 SEIZURE (MULTI): ICD-10-CM

## 2024-02-26 DIAGNOSIS — E11.42 DM TYPE 2 WITH DIABETIC PERIPHERAL NEUROPATHY (MULTI): Primary | ICD-10-CM

## 2024-02-26 DIAGNOSIS — M32.9 LUPUS (MULTI): Chronic | ICD-10-CM

## 2024-02-26 PROCEDURE — 99490 CHRNC CARE MGMT STAFF 1ST 20: CPT | Performed by: FAMILY MEDICINE

## 2024-02-26 NOTE — PROGRESS NOTES
Discharge facility: [facility]   Discharge diagnosis: Seizure   Admission date: 1/28/24   Discharge date: 2/23/24     PCP Appointment Date: 3/6  Hospital Encounter and Summary: not available at this time   See Discharge assessment below for further details     Engagement  Call Start Time: 1042 (2/27/2024 10:42 AM)    Medications  Medications reviewed with patient/caregiver?: Yes (2/27/2024 10:42 AM)  Is the patient having any side effects they believe may be caused by any medication additions or changes?: (!) Yes (Reports swelling to bilateral lower legs due to being removed off of diuretic) (2/27/2024 10:42 AM)  Does the patient have all medications ordered at discharge?: Yes (but running low, needs refill of both insulin and needle tops and Keppra) (2/27/2024 10:42 AM)  Care Management Interventions: Provided patient education (2/27/2024 10:42 AM)  Is the patient taking all medications as directed (includes completed medication regime)?: Yes (2/27/2024 10:42 AM)  Care Management Interventions: Provided patient education; Notified pharmacy for assistance; Notified provider (2/27/2024 10:42 AM)  Medication Comments: Rx need sent to [facility] (2/27/2024 10:42 AM)    Appointments  Does the patient have a primary care provider?: Yes (2/27/2024 10:42 AM)  Care Management Interventions: Verified appointment date/time/provider; Educated patient on importance of making appointment; Advised patient to make appointment (2/27/2024 10:42 AM)  Has the patient kept scheduled appointments due by today?: Yes (2/27/2024 10:42 AM)  Care Management Interventions: Advised patient to keep appointment; Educated on importance of keeping appointment; Advised to schedule with specialist (2/27/2024 10:42 AM)  Follow Up Tasks: Appointments (Scheduled) (2/27/2024 10:42 AM)    Self Management  What is the home health agency?: [facility] (PT cam 2/26, SN 2/25.) (2/27/2024 10:42 AM)  Has home health visited the patient within 72 hours of  discharge?: Yes (2/27/2024 10:42 AM)  Care Management Interventions: Other (Comment) (contacted SNF for discharge Packet) (2/27/2024 10:42 AM)    Patient Teaching  Does the patient have access to their discharge instructions?: Yes (2/27/2024 10:42 AM)  Care Management Interventions: -- (unable to get discharge summary from [place] by TCM call.) (2/27/2024 10:42 AM)  What is the patient's perception of their health status since discharge?: Improving (2/27/2024 10:42 AM)  Is the patient/caregiver able to teach back the hierarchy of who to call/visit for symptoms/problems? PCP, Specialist, Home Health nurse, Urgent Care, ED, 911: Yes (2/27/2024 10:42 AM)    Wrap Up  Would the patient like more information on Advance Care Planning?: No (2/27/2024 10:42 AM)  Wrap Up Additional Comments: [place] [facility] [place] SNF. While in facility patient was removed off of diuretics (torsemide 10 mg), patient reporting she is now having increased leg edema (pitting). Denies any weeping. Also experiencing increased shortness of breath. Denies Chest pain. States she is wearing compression stockings PRN and elevating legs. Notified PCP. Additionally patient was started on insulin while at facility - provided education on insulins. Patient in need of refills on both insulins with replacement needles as well as a refil on Keppra 500mg every 12 hr. Pended to provider. Patient denies history of seizures. Denies referral or seeing neurologist. Will await PCP appointment  for advisement. States sacral wound is healed - denies any open wounds. Does state she has a left foot corn, hasnt seen podiatrist in awhile. New referral placed -  Renetta Cahnd (seen in past). (2/27/2024 10:42 AM)  Call End Time: 1106 (2/27/2024 10:42 AM)

## 2024-02-27 RX ORDER — LEVETIRACETAM 500 MG/1
500 TABLET ORAL EVERY 12 HOURS
Qty: 60 TABLET | Refills: 0 | Status: ON HOLD | OUTPATIENT
Start: 2024-02-27

## 2024-02-27 RX ORDER — INSULIN LISPRO 100 [IU]/ML
INJECTION, SOLUTION INTRAVENOUS; SUBCUTANEOUS
Qty: 1 EACH | Refills: 3 | Status: SHIPPED | OUTPATIENT
Start: 2024-02-27 | End: 2024-04-30 | Stop reason: HOSPADM

## 2024-02-27 RX ORDER — PEN NEEDLE, DIABETIC 30 GX3/16"
NEEDLE, DISPOSABLE MISCELLANEOUS
Qty: 100 EACH | Refills: 11 | Status: ON HOLD | OUTPATIENT
Start: 2024-02-27 | End: 2025-02-26

## 2024-02-27 RX ORDER — INSULIN GLARGINE 100 [IU]/ML
20 INJECTION, SOLUTION SUBCUTANEOUS NIGHTLY
Qty: 6 ML | Refills: 11 | Status: SHIPPED | OUTPATIENT
Start: 2024-02-27 | End: 2024-03-26 | Stop reason: HOSPADM

## 2024-03-06 ENCOUNTER — OFFICE VISIT (OUTPATIENT)
Dept: PRIMARY CARE | Facility: CLINIC | Age: 64
End: 2024-03-06
Payer: MEDICARE

## 2024-03-06 VITALS
HEIGHT: 70 IN | TEMPERATURE: 96.7 F | SYSTOLIC BLOOD PRESSURE: 126 MMHG | BODY MASS INDEX: 27.26 KG/M2 | DIASTOLIC BLOOD PRESSURE: 78 MMHG | RESPIRATION RATE: 16 BRPM | HEART RATE: 66 BPM

## 2024-03-06 DIAGNOSIS — I27.20 PULMONARY HYPERTENSION (MULTI): Chronic | ICD-10-CM

## 2024-03-06 DIAGNOSIS — D50.0 IRON DEFICIENCY ANEMIA DUE TO CHRONIC BLOOD LOSS: ICD-10-CM

## 2024-03-06 DIAGNOSIS — D46.9 MYELODYSPLASTIC SYNDROME, UNSPECIFIED (MULTI): ICD-10-CM

## 2024-03-06 DIAGNOSIS — M05.742 RHEUMATOID ARTHRITIS INVOLVING BOTH HANDS WITH POSITIVE RHEUMATOID FACTOR (MULTI): Primary | ICD-10-CM

## 2024-03-06 DIAGNOSIS — N18.4 CHRONIC KIDNEY DISEASE, STAGE 4 (SEVERE) (MULTI): ICD-10-CM

## 2024-03-06 DIAGNOSIS — M05.741 RHEUMATOID ARTHRITIS INVOLVING BOTH HANDS WITH POSITIVE RHEUMATOID FACTOR (MULTI): Primary | ICD-10-CM

## 2024-03-06 DIAGNOSIS — K51.011 ULCERATIVE (CHRONIC) PANCOLITIS WITH RECTAL BLEEDING (MULTI): ICD-10-CM

## 2024-03-06 DIAGNOSIS — F29 UNSPECIFIED PSYCHOSIS NOT DUE TO A SUBSTANCE OR KNOWN PHYSIOLOGICAL CONDITION (MULTI): ICD-10-CM

## 2024-03-06 DIAGNOSIS — I42.9 CARDIOMYOPATHY, UNSPECIFIED TYPE (MULTI): ICD-10-CM

## 2024-03-06 DIAGNOSIS — I10 BENIGN ESSENTIAL HTN: Chronic | ICD-10-CM

## 2024-03-06 DIAGNOSIS — G82.20 PARAPARESIS (MULTI): ICD-10-CM

## 2024-03-06 DIAGNOSIS — E11.3591: ICD-10-CM

## 2024-03-06 DIAGNOSIS — E78.49 OTHER HYPERLIPIDEMIA: Chronic | ICD-10-CM

## 2024-03-06 PROCEDURE — 99495 TRANSJ CARE MGMT MOD F2F 14D: CPT | Performed by: FAMILY MEDICINE

## 2024-03-06 PROCEDURE — 1036F TOBACCO NON-USER: CPT | Performed by: FAMILY MEDICINE

## 2024-03-06 PROCEDURE — 3051F HG A1C>EQUAL 7.0%<8.0%: CPT | Performed by: FAMILY MEDICINE

## 2024-03-06 PROCEDURE — 3078F DIAST BP <80 MM HG: CPT | Performed by: FAMILY MEDICINE

## 2024-03-06 PROCEDURE — 3074F SYST BP LT 130 MM HG: CPT | Performed by: FAMILY MEDICINE

## 2024-03-06 PROCEDURE — 3008F BODY MASS INDEX DOCD: CPT | Performed by: FAMILY MEDICINE

## 2024-03-06 NOTE — PROGRESS NOTES
"Subjective   Patient ID: Bing Holliday is a 63 y.o. female who presents for Follow-up.  HPI  Discharge facility: [facility]   Discharge diagnosis: Seizure   Admission date: 1/28/24   Discharge date: 2/23/24     Pt had done BW, MR spine, XR cervical spine , EKG    Pt was prescribe Keppra    No other concern   Review of systems  ; Patient seen today for exam denies any problems with headaches or vision, denies any shortness of breath chest pain nausea or vomiting, no black stool no blood in the stool no heartburn type symptoms denies any problems with constipation or diarrhea, and no dysuria-type symptoms    The patient's allergies medications were reviewed with them today    The patient's social family and surgical history or also reviewed here today, along with her past medical history.     Objective     Alert and active in  no acute distress  HEENT TMs clear oropharynx negative nares clear no drainage noted neck supple  With no adenopathy   Heart regular rate and rhythm without murmur and no carotid bruits  Lungs- clear to auscultation bilaterally, no wheeze or rhonchi noted  Thyroid -negative masses or nodularity  Abdomen- soft times four quadrants , bowel sounds positive no masses or organomegaly, negative tenderness guarding or rebound  Neurological exam unremarkable- DTRs in upper and lower extremities within normal limits.  For her with some drying of her hands secondary to her rheumatoid arthritis contracture  skin -no lesions noted    Extremities 1+ edema both lower legs probably from steroids    /78 (BP Location: Right arm, Patient Position: Sitting, BP Cuff Size: Adult)   Pulse 66   Temp 35.9 °C (96.7 °F) (Temporal)   Resp 16   Ht 1.778 m (5' 10\")   LMP  (LMP Unknown)   BMI 27.26 kg/m²     Allergies   Allergen Reactions    Ace Inhibitors Swelling, Angioedema, Shortness of breath and Other     'FACIAL TWISTING\" \"LOOKS LIKE I HAD A STROKE IN MY SLEEP\"    Hydroxychloroquine Other, Nausea And " Vomiting, Nausea/vomiting and Unknown     RETINAL BLEEDING    RETINAL BLEEDING      RETINAL BLEEDING, Eye problems    Lisinopril Swelling    Penicillins Unknown     tolerates cephalosporins-unknown childhood allergy    Sulfa (Sulfonamide Antibiotics) Hives       Assessment/Plan   Problem List Items Addressed This Visit       Myelodysplastic syndrome, unspecified (CMS/HCC)    Hyperlipidemia (Chronic)    Proliferative diabetic retinopathy of right eye without macular edema determined by examination associated with type 2 diabetes mellitus (CMS/HCC)    Paraparesis (CMS/HCC)    Anemia (Chronic)    Relevant Orders    Ferritin    Iron and TIBC    Cardiomyopathy (CMS/HCC)    Benign essential HTN (Chronic)    Pulmonary hypertension (CMS/HCC) (Chronic)    Rheumatoid arthritis involving both hands with positive rheumatoid factor (CMS/HCC) - Primary    Chronic kidney disease, stage 4 (severe) (CMS/HCC)    Relevant Orders    CBC and Auto Differential    Comprehensive Metabolic Panel    Ulcerative (chronic) pancolitis with rectal bleeding (CMS/HCC)     Other Visit Diagnoses       Unspecified psychosis not due to a substance or known physiological condition (CMS/HCC)            We have to do everything we can to keep her sugars up she is currently taking long-acting insulin and short acting we will check tomorrow with her medicines at home and have her care coordinator nurse touch base with her    Need to keep an eye on her kidney function make sure things going the right direction understands importance of this her son has been a great help frustrated that she does need lots of help hopefully on days when he goes to work she can get up earlier      If anything worsens or changes please call us at once, follow up in the office as planned,

## 2024-03-07 ENCOUNTER — PATIENT OUTREACH (OUTPATIENT)
Dept: PRIMARY CARE | Facility: CLINIC | Age: 64
End: 2024-03-07
Payer: MEDICARE

## 2024-03-07 DIAGNOSIS — I27.20 PULMONARY HYPERTENSION (MULTI): ICD-10-CM

## 2024-03-07 DIAGNOSIS — M32.9 LUPUS (MULTI): ICD-10-CM

## 2024-03-07 DIAGNOSIS — R56.9 SEIZURE (MULTI): ICD-10-CM

## 2024-03-07 DIAGNOSIS — M05.742 RHEUMATOID ARTHRITIS INVOLVING BOTH HANDS WITH POSITIVE RHEUMATOID FACTOR (MULTI): ICD-10-CM

## 2024-03-07 DIAGNOSIS — N18.4 CHRONIC KIDNEY DISEASE, STAGE 4 (SEVERE) (MULTI): ICD-10-CM

## 2024-03-07 DIAGNOSIS — M05.741 RHEUMATOID ARTHRITIS INVOLVING BOTH HANDS WITH POSITIVE RHEUMATOID FACTOR (MULTI): ICD-10-CM

## 2024-03-07 PROCEDURE — 99439 CHRNC CARE MGMT STAF EA ADDL: CPT | Performed by: FAMILY MEDICINE

## 2024-03-07 PROCEDURE — 99490 CHRNC CARE MGMT STAFF 1ST 20: CPT | Performed by: FAMILY MEDICINE

## 2024-03-07 RX ORDER — LEVETIRACETAM 500 MG/1
500 TABLET ORAL EVERY 12 HOURS
Qty: 60 TABLET | Refills: 0 | Status: CANCELLED | OUTPATIENT
Start: 2024-03-07 | End: 2024-04-06

## 2024-03-07 NOTE — PROGRESS NOTES
Unable to reach patient for call back after patient's follow up appointment with PCP.   LVM with call back number for patient to call if needed   If no voicemail available call attempts x 2 were made to contact the patient to assist with any questions or concerns patient may have.    Following contact will be through CCM program.

## 2024-03-07 NOTE — PROGRESS NOTES
Chart review complete.     Confirmation of at least 2 patient identifiers with patient.  Monthly outreach complete.    Patient followed up with PCP yesterday. Denies any new questions since visit. Medications partly reviewed and reflected in chart. While reviewing medications patient urgently has to go to bathroom and gets off phone. Attempted to reach back out to patient and unable to reach. Will wait for return call or try patient again tomorrow.

## 2024-03-08 DIAGNOSIS — E11.42 DM TYPE 2 WITH DIABETIC PERIPHERAL NEUROPATHY (MULTI): Primary | Chronic | ICD-10-CM

## 2024-03-08 NOTE — PROGRESS NOTES
Was able to reach patient today, however, currently working with PT and states she does not have list on her. Patient states she will call back when she is able to finish the medication review.     Also requesting medication list to be sent over to [facility] home care ([facility]), notified once med review gets complete a list can be sent (PHONE: [phone]; FAX: [phone])

## 2024-03-09 ENCOUNTER — HOSPITAL ENCOUNTER (INPATIENT)
Facility: HOSPITAL | Age: 64
LOS: 5 days | Discharge: HOME | DRG: 643 | End: 2024-03-14
Attending: EMERGENCY MEDICINE | Admitting: STUDENT IN AN ORGANIZED HEALTH CARE EDUCATION/TRAINING PROGRAM
Payer: MEDICARE

## 2024-03-09 ENCOUNTER — APPOINTMENT (OUTPATIENT)
Dept: CARDIOLOGY | Facility: HOSPITAL | Age: 64
DRG: 643 | End: 2024-03-09
Payer: MEDICARE

## 2024-03-09 ENCOUNTER — APPOINTMENT (OUTPATIENT)
Dept: RADIOLOGY | Facility: HOSPITAL | Age: 64
DRG: 643 | End: 2024-03-09
Payer: MEDICARE

## 2024-03-09 DIAGNOSIS — M48.062 SPINAL STENOSIS OF LUMBAR REGION WITH NEUROGENIC CLAUDICATION: ICD-10-CM

## 2024-03-09 DIAGNOSIS — R56.9 SEIZURE (MULTI): ICD-10-CM

## 2024-03-09 DIAGNOSIS — M05.741 RHEUMATOID ARTHRITIS INVOLVING BOTH HANDS WITH POSITIVE RHEUMATOID FACTOR (MULTI): ICD-10-CM

## 2024-03-09 DIAGNOSIS — M05.742 RHEUMATOID ARTHRITIS INVOLVING BOTH HANDS WITH POSITIVE RHEUMATOID FACTOR (MULTI): ICD-10-CM

## 2024-03-09 DIAGNOSIS — R44.3 HALLUCINATION: ICD-10-CM

## 2024-03-09 DIAGNOSIS — M79.89 SWELLING OF RIGHT FOOT: Primary | ICD-10-CM

## 2024-03-09 DIAGNOSIS — E16.2 HYPOGLYCEMIA: ICD-10-CM

## 2024-03-09 DIAGNOSIS — I48.91 UNSPECIFIED ATRIAL FIBRILLATION (MULTI): ICD-10-CM

## 2024-03-09 DIAGNOSIS — I11.0 HYPERTENSIVE HEART DISEASE WITH CONGESTIVE HEART FAILURE, UNSPECIFIED HEART FAILURE TYPE (MULTI): ICD-10-CM

## 2024-03-09 DIAGNOSIS — R68.0 HYPOTHERMIA DUE TO NON-ENVIRONMENTAL CAUSE: ICD-10-CM

## 2024-03-09 LAB
ABO GROUP (TYPE) IN BLOOD: NORMAL
ACANTHOCYTES BLD QL SMEAR: ABNORMAL
ALBUMIN SERPL BCP-MCNC: 3.5 G/DL (ref 3.4–5)
ALP SERPL-CCNC: 109 U/L (ref 33–136)
ALT SERPL W P-5'-P-CCNC: 28 U/L (ref 7–45)
AMPHETAMINES UR QL SCN: NORMAL
ANION GAP SERPL CALC-SCNC: 13 MMOL/L (ref 10–20)
ANTIBODY SCREEN: NORMAL
APPEARANCE UR: ABNORMAL
AST SERPL W P-5'-P-CCNC: 29 U/L (ref 9–39)
B-HCG SERPL-ACNC: 4 MIU/ML
BACTERIA #/AREA URNS AUTO: ABNORMAL /HPF
BARBITURATES UR QL SCN: NORMAL
BASE EXCESS BLDV CALC-SCNC: -5.2 MMOL/L (ref -2–3)
BASOPHILS # BLD MANUAL: 0 X10*3/UL (ref 0–0.1)
BASOPHILS NFR BLD MANUAL: 0 %
BENZODIAZ UR QL SCN: NORMAL
BILIRUB SERPL-MCNC: 0.4 MG/DL (ref 0–1.2)
BILIRUB UR STRIP.AUTO-MCNC: NEGATIVE MG/DL
BODY TEMPERATURE: ABNORMAL
BUN SERPL-MCNC: 39 MG/DL (ref 6–23)
BURR CELLS BLD QL SMEAR: ABNORMAL
BZE UR QL SCN: NORMAL
CALCIUM SERPL-MCNC: 8.6 MG/DL (ref 8.6–10.3)
CANNABINOIDS UR QL SCN: NORMAL
CARDIAC TROPONIN I PNL SERPL HS: 18 NG/L (ref 0–13)
CARDIAC TROPONIN I PNL SERPL HS: 20 NG/L (ref 0–13)
CHLORIDE SERPL-SCNC: 110 MMOL/L (ref 98–107)
CO2 SERPL-SCNC: 23 MMOL/L (ref 21–32)
COLOR UR: YELLOW
CREAT SERPL-MCNC: 1.56 MG/DL (ref 0.5–1.05)
CRITICAL CALL TIME: 747
CRITICAL CALLED BY: ABNORMAL
CRITICAL CALLED TO: ABNORMAL
CRITICAL READ BACK: ABNORMAL
EGFRCR SERPLBLD CKD-EPI 2021: 37 ML/MIN/1.73M*2
EOSINOPHIL # BLD MANUAL: 0 X10*3/UL (ref 0–0.7)
EOSINOPHIL NFR BLD MANUAL: 0 %
ERYTHROCYTE [DISTWIDTH] IN BLOOD BY AUTOMATED COUNT: 22.1 % (ref 11.5–14.5)
ETHANOL SERPL-MCNC: <10 MG/DL
FENTANYL+NORFENTANYL UR QL SCN: NORMAL
GIANT PLATELETS BLD QL SMEAR: ABNORMAL
GLUCOSE BLD MANUAL STRIP-MCNC: 144 MG/DL (ref 74–99)
GLUCOSE BLD MANUAL STRIP-MCNC: 162 MG/DL (ref 74–99)
GLUCOSE BLD MANUAL STRIP-MCNC: 165 MG/DL (ref 74–99)
GLUCOSE BLD MANUAL STRIP-MCNC: 50 MG/DL (ref 74–99)
GLUCOSE BLD MANUAL STRIP-MCNC: 63 MG/DL (ref 74–99)
GLUCOSE BLD MANUAL STRIP-MCNC: 76 MG/DL (ref 74–99)
GLUCOSE BLD MANUAL STRIP-MCNC: 92 MG/DL (ref 74–99)
GLUCOSE SERPL-MCNC: 120 MG/DL (ref 74–99)
GLUCOSE UR STRIP.AUTO-MCNC: NEGATIVE MG/DL
HCO3 BLDV-SCNC: 23.3 MMOL/L (ref 22–26)
HCT VFR BLD AUTO: 27.3 % (ref 36–46)
HGB BLD-MCNC: 7.9 G/DL (ref 12–16)
HOLD SPECIMEN: NORMAL
IMM GRANULOCYTES # BLD AUTO: 0.06 X10*3/UL (ref 0–0.7)
IMM GRANULOCYTES NFR BLD AUTO: 0.8 % (ref 0–0.9)
INHALED O2 CONCENTRATION: 21 %
INR PPP: 1 (ref 0.9–1.1)
KETONES UR STRIP.AUTO-MCNC: NEGATIVE MG/DL
LACTATE SERPL-SCNC: 1.8 MMOL/L (ref 0.4–2)
LEUKOCYTE ESTERASE UR QL STRIP.AUTO: NEGATIVE
LEVETIRACETAM SERPL-MCNC: 21 UG/ML (ref 10–40)
LYMPHOCYTES # BLD MANUAL: 0.38 X10*3/UL (ref 1.2–4.8)
LYMPHOCYTES NFR BLD MANUAL: 5 %
MCH RBC QN AUTO: 29.9 PG (ref 26–34)
MCHC RBC AUTO-ENTMCNC: 28.9 G/DL (ref 32–36)
MCV RBC AUTO: 103 FL (ref 80–100)
METHADONE UR QL SCN: NORMAL
MONOCYTES # BLD MANUAL: 0.23 X10*3/UL (ref 0.1–1)
MONOCYTES NFR BLD MANUAL: 3 %
NEUTROPHILS # BLD MANUAL: 6.91 X10*3/UL (ref 1.2–7.7)
NEUTS BAND # BLD MANUAL: 0.23 X10*3/UL (ref 0–0.7)
NEUTS BAND NFR BLD MANUAL: 3 %
NEUTS SEG # BLD MANUAL: 6.68 X10*3/UL (ref 1.2–7)
NEUTS SEG NFR BLD MANUAL: 89 %
NITRITE UR QL STRIP.AUTO: NEGATIVE
NRBC BLD-RTO: 0.5 /100 WBCS (ref 0–0)
OPIATES UR QL SCN: NORMAL
OVALOCYTES BLD QL SMEAR: ABNORMAL
OXYCODONE+OXYMORPHONE UR QL SCN: NORMAL
OXYHGB MFR BLDV: 42.5 % (ref 45–75)
PCO2 BLDV: 57 MM HG (ref 41–51)
PCP UR QL SCN: NORMAL
PH BLDV: 7.22 PH (ref 7.33–7.43)
PH UR STRIP.AUTO: 5 [PH]
PLATELET # BLD AUTO: 78 X10*3/UL (ref 150–450)
PO2 BLDV: 32 MM HG (ref 35–45)
POLYCHROMASIA BLD QL SMEAR: ABNORMAL
POTASSIUM SERPL-SCNC: 4.4 MMOL/L (ref 3.5–5.3)
PROT SERPL-MCNC: 6 G/DL (ref 6.4–8.2)
PROT UR STRIP.AUTO-MCNC: ABNORMAL MG/DL
PROTHROMBIN TIME: 11 SECONDS (ref 9.8–12.8)
RBC # BLD AUTO: 2.64 X10*6/UL (ref 4–5.2)
RBC # UR STRIP.AUTO: NEGATIVE /UL
RBC #/AREA URNS AUTO: ABNORMAL /HPF
RBC MORPH BLD: ABNORMAL
RH FACTOR (ANTIGEN D): NORMAL
SAO2 % BLDV: 43 % (ref 45–75)
SCHISTOCYTES BLD QL SMEAR: ABNORMAL
SODIUM SERPL-SCNC: 142 MMOL/L (ref 136–145)
SP GR UR STRIP.AUTO: 1.01
TARGETS BLD QL SMEAR: ABNORMAL
TOTAL CELLS COUNTED BLD: 100
TSH SERPL-ACNC: 2.93 MIU/L (ref 0.44–3.98)
UROBILINOGEN UR STRIP.AUTO-MCNC: <2 MG/DL
WBC # BLD AUTO: 7.5 X10*3/UL (ref 4.4–11.3)
WBC #/AREA URNS AUTO: ABNORMAL /HPF

## 2024-03-09 PROCEDURE — 83605 ASSAY OF LACTIC ACID: CPT | Performed by: EMERGENCY MEDICINE

## 2024-03-09 PROCEDURE — 2500000001 HC RX 250 WO HCPCS SELF ADMINISTERED DRUGS (ALT 637 FOR MEDICARE OP): Performed by: STUDENT IN AN ORGANIZED HEALTH CARE EDUCATION/TRAINING PROGRAM

## 2024-03-09 PROCEDURE — 99285 EMERGENCY DEPT VISIT HI MDM: CPT | Performed by: EMERGENCY MEDICINE

## 2024-03-09 PROCEDURE — 82077 ASSAY SPEC XCP UR&BREATH IA: CPT | Performed by: EMERGENCY MEDICINE

## 2024-03-09 PROCEDURE — 82947 ASSAY GLUCOSE BLOOD QUANT: CPT

## 2024-03-09 PROCEDURE — 2060000001 HC INTERMEDIATE ICU ROOM DAILY

## 2024-03-09 PROCEDURE — 84702 CHORIONIC GONADOTROPIN TEST: CPT | Performed by: EMERGENCY MEDICINE

## 2024-03-09 PROCEDURE — 36415 COLL VENOUS BLD VENIPUNCTURE: CPT | Performed by: EMERGENCY MEDICINE

## 2024-03-09 PROCEDURE — 70450 CT HEAD/BRAIN W/O DYE: CPT

## 2024-03-09 PROCEDURE — 81001 URINALYSIS AUTO W/SCOPE: CPT | Performed by: EMERGENCY MEDICINE

## 2024-03-09 PROCEDURE — 2500000004 HC RX 250 GENERAL PHARMACY W/ HCPCS (ALT 636 FOR OP/ED): Performed by: STUDENT IN AN ORGANIZED HEALTH CARE EDUCATION/TRAINING PROGRAM

## 2024-03-09 PROCEDURE — 2500000005 HC RX 250 GENERAL PHARMACY W/O HCPCS: Performed by: STUDENT IN AN ORGANIZED HEALTH CARE EDUCATION/TRAINING PROGRAM

## 2024-03-09 PROCEDURE — 84484 ASSAY OF TROPONIN QUANT: CPT | Performed by: EMERGENCY MEDICINE

## 2024-03-09 PROCEDURE — G0390 TRAUMA RESPONS W/HOSP CRITI: HCPCS

## 2024-03-09 PROCEDURE — 86225 DNA ANTIBODY NATIVE: CPT | Mod: STJLAB | Performed by: STUDENT IN AN ORGANIZED HEALTH CARE EDUCATION/TRAINING PROGRAM

## 2024-03-09 PROCEDURE — 93010 ELECTROCARDIOGRAM REPORT: CPT | Performed by: INTERNAL MEDICINE

## 2024-03-09 PROCEDURE — 86901 BLOOD TYPING SEROLOGIC RH(D): CPT | Performed by: EMERGENCY MEDICINE

## 2024-03-09 PROCEDURE — 93005 ELECTROCARDIOGRAM TRACING: CPT

## 2024-03-09 PROCEDURE — 72125 CT NECK SPINE W/O DYE: CPT

## 2024-03-09 PROCEDURE — 70450 CT HEAD/BRAIN W/O DYE: CPT | Performed by: RADIOLOGY

## 2024-03-09 PROCEDURE — 87086 URINE CULTURE/COLONY COUNT: CPT | Mod: STJLAB | Performed by: EMERGENCY MEDICINE

## 2024-03-09 PROCEDURE — 87040 BLOOD CULTURE FOR BACTERIA: CPT | Mod: STJLAB | Performed by: EMERGENCY MEDICINE

## 2024-03-09 PROCEDURE — 80177 DRUG SCRN QUAN LEVETIRACETAM: CPT | Mod: STJLAB | Performed by: EMERGENCY MEDICINE

## 2024-03-09 PROCEDURE — 85007 BL SMEAR W/DIFF WBC COUNT: CPT | Performed by: EMERGENCY MEDICINE

## 2024-03-09 PROCEDURE — 80307 DRUG TEST PRSMV CHEM ANLYZR: CPT | Performed by: EMERGENCY MEDICINE

## 2024-03-09 PROCEDURE — 85610 PROTHROMBIN TIME: CPT | Performed by: EMERGENCY MEDICINE

## 2024-03-09 PROCEDURE — 93010 ELECTROCARDIOGRAM REPORT: CPT | Performed by: EMERGENCY MEDICINE

## 2024-03-09 PROCEDURE — 2500000002 HC RX 250 W HCPCS SELF ADMINISTERED DRUGS (ALT 637 FOR MEDICARE OP, ALT 636 FOR OP/ED): Performed by: STUDENT IN AN ORGANIZED HEALTH CARE EDUCATION/TRAINING PROGRAM

## 2024-03-09 PROCEDURE — 80053 COMPREHEN METABOLIC PANEL: CPT | Performed by: EMERGENCY MEDICINE

## 2024-03-09 PROCEDURE — 99285 EMERGENCY DEPT VISIT HI MDM: CPT | Mod: 25

## 2024-03-09 PROCEDURE — 72125 CT NECK SPINE W/O DYE: CPT | Performed by: RADIOLOGY

## 2024-03-09 PROCEDURE — 99223 1ST HOSP IP/OBS HIGH 75: CPT | Performed by: STUDENT IN AN ORGANIZED HEALTH CARE EDUCATION/TRAINING PROGRAM

## 2024-03-09 PROCEDURE — 82805 BLOOD GASES W/O2 SATURATION: CPT | Performed by: EMERGENCY MEDICINE

## 2024-03-09 PROCEDURE — 84443 ASSAY THYROID STIM HORMONE: CPT | Performed by: EMERGENCY MEDICINE

## 2024-03-09 PROCEDURE — 85027 COMPLETE CBC AUTOMATED: CPT | Performed by: EMERGENCY MEDICINE

## 2024-03-09 RX ORDER — INSULIN LISPRO 100 [IU]/ML
0-10 INJECTION, SOLUTION INTRAVENOUS; SUBCUTANEOUS
Status: DISCONTINUED | OUTPATIENT
Start: 2024-03-09 | End: 2024-03-14 | Stop reason: HOSPADM

## 2024-03-09 RX ORDER — DEXTROSE MONOHYDRATE 100 MG/ML
0.3 INJECTION, SOLUTION INTRAVENOUS ONCE AS NEEDED
Status: DISCONTINUED | OUTPATIENT
Start: 2024-03-09 | End: 2024-03-14 | Stop reason: HOSPADM

## 2024-03-09 RX ORDER — PANTOPRAZOLE SODIUM 40 MG/1
40 TABLET, DELAYED RELEASE ORAL DAILY
Status: DISCONTINUED | OUTPATIENT
Start: 2024-03-09 | End: 2024-03-14 | Stop reason: HOSPADM

## 2024-03-09 RX ORDER — MYCOPHENOLATE MOFETIL 250 MG/1
1000 CAPSULE ORAL 2 TIMES DAILY
Status: DISCONTINUED | OUTPATIENT
Start: 2024-03-09 | End: 2024-03-14 | Stop reason: HOSPADM

## 2024-03-09 RX ORDER — BUDESONIDE 0.5 MG/2ML
0.5 INHALANT ORAL
Status: DISCONTINUED | OUTPATIENT
Start: 2024-03-09 | End: 2024-03-10

## 2024-03-09 RX ORDER — LANOLIN ALCOHOL/MO/W.PET/CERES
100 CREAM (GRAM) TOPICAL DAILY
Status: DISCONTINUED | OUTPATIENT
Start: 2024-03-09 | End: 2024-03-14 | Stop reason: HOSPADM

## 2024-03-09 RX ORDER — FOLIC ACID 1 MG/1
1 TABLET ORAL DAILY
Status: DISCONTINUED | OUTPATIENT
Start: 2024-03-09 | End: 2024-03-14 | Stop reason: HOSPADM

## 2024-03-09 RX ORDER — IPRATROPIUM BROMIDE AND ALBUTEROL SULFATE 2.5; .5 MG/3ML; MG/3ML
3 SOLUTION RESPIRATORY (INHALATION) EVERY 4 HOURS PRN
Status: DISCONTINUED | OUTPATIENT
Start: 2024-03-09 | End: 2024-03-14 | Stop reason: HOSPADM

## 2024-03-09 RX ORDER — LEVETIRACETAM 500 MG/1
500 TABLET ORAL EVERY 12 HOURS
Status: DISCONTINUED | OUTPATIENT
Start: 2024-03-09 | End: 2024-03-14 | Stop reason: HOSPADM

## 2024-03-09 RX ORDER — LANOLIN ALCOHOL/MO/W.PET/CERES
100 CREAM (GRAM) TOPICAL DAILY
Status: ON HOLD | COMMUNITY

## 2024-03-09 RX ORDER — DEXTROSE 50 % IN WATER (D50W) INTRAVENOUS SYRINGE
25
Status: DISCONTINUED | OUTPATIENT
Start: 2024-03-09 | End: 2024-03-14 | Stop reason: HOSPADM

## 2024-03-09 RX ORDER — IPRATROPIUM BROMIDE 0.5 MG/2.5ML
0.5 SOLUTION RESPIRATORY (INHALATION)
Status: DISCONTINUED | OUTPATIENT
Start: 2024-03-09 | End: 2024-03-10

## 2024-03-09 RX ORDER — ACETAMINOPHEN 500 MG
5 TABLET ORAL NIGHTLY
Status: DISCONTINUED | OUTPATIENT
Start: 2024-03-09 | End: 2024-03-14 | Stop reason: HOSPADM

## 2024-03-09 RX ORDER — FORMOTEROL FUMARATE DIHYDRATE 20 UG/2ML
20 SOLUTION RESPIRATORY (INHALATION)
Status: DISCONTINUED | OUTPATIENT
Start: 2024-03-09 | End: 2024-03-10

## 2024-03-09 RX ORDER — AMLODIPINE BESYLATE 2.5 MG/1
2.5 TABLET ORAL DAILY
Status: DISCONTINUED | OUTPATIENT
Start: 2024-03-09 | End: 2024-03-14 | Stop reason: HOSPADM

## 2024-03-09 RX ORDER — ATORVASTATIN CALCIUM 40 MG/1
40 TABLET, FILM COATED ORAL DAILY
Status: DISCONTINUED | OUTPATIENT
Start: 2024-03-09 | End: 2024-03-14 | Stop reason: HOSPADM

## 2024-03-09 RX ORDER — FLUTICASONE FUROATE AND VILANTEROL 200; 25 UG/1; UG/1
1 POWDER RESPIRATORY (INHALATION)
Status: DISCONTINUED | OUTPATIENT
Start: 2024-03-09 | End: 2024-03-09

## 2024-03-09 RX ORDER — ALBUTEROL SULFATE 90 UG/1
2 AEROSOL, METERED RESPIRATORY (INHALATION) EVERY 6 HOURS PRN
Status: ON HOLD | COMMUNITY

## 2024-03-09 RX ORDER — PREDNISONE 5 MG/1
15 TABLET ORAL EVERY EVENING
COMMUNITY
End: 2024-03-14 | Stop reason: HOSPADM

## 2024-03-09 RX ADMIN — DEXTROSE MONOHYDRATE 25 G: 25 INJECTION, SOLUTION INTRAVENOUS at 20:30

## 2024-03-09 RX ADMIN — MYCOPHENOLATE MOFETIL 1000 MG: 250 CAPSULE ORAL at 20:30

## 2024-03-09 RX ADMIN — LEVETIRACETAM 500 MG: 500 TABLET, FILM COATED ORAL at 17:48

## 2024-03-09 RX ADMIN — THIAMINE HCL TAB 100 MG 100 MG: 100 TAB at 17:48

## 2024-03-09 RX ADMIN — FOLIC ACID 1 MG: 1 TABLET ORAL at 17:48

## 2024-03-09 RX ADMIN — DEXTROSE MONOHYDRATE 25 G: 25 INJECTION, SOLUTION INTRAVENOUS at 16:32

## 2024-03-09 RX ADMIN — PANTOPRAZOLE SODIUM 40 MG: 40 TABLET, DELAYED RELEASE ORAL at 17:47

## 2024-03-09 RX ADMIN — HYDROCORTISONE SODIUM SUCCINATE 100 MG: 100 INJECTION, POWDER, FOR SOLUTION INTRAMUSCULAR; INTRAVENOUS at 18:33

## 2024-03-09 RX ADMIN — SODIUM ZIRCONIUM CYCLOSILICATE 10 G: 10 POWDER, FOR SUSPENSION ORAL at 20:30

## 2024-03-09 RX ADMIN — AMLODIPINE BESYLATE 2.5 MG: 2.5 TABLET ORAL at 17:48

## 2024-03-09 RX ADMIN — Medication 5 MG: at 20:30

## 2024-03-09 RX ADMIN — APIXABAN 2.5 MG: 2.5 TABLET, FILM COATED ORAL at 20:30

## 2024-03-09 RX ADMIN — ATORVASTATIN CALCIUM 40 MG: 40 TABLET, FILM COATED ORAL at 20:30

## 2024-03-09 SDOH — HEALTH STABILITY: MENTAL HEALTH: HOW MANY STANDARD DRINKS CONTAINING ALCOHOL DO YOU HAVE ON A TYPICAL DAY?: 1 OR 2

## 2024-03-09 SDOH — ECONOMIC STABILITY: FOOD INSECURITY
WITHIN THE PAST 12 MONTHS, YOU WORRIED THAT YOUR FOOD WOULD RUN OUT BEFORE YOU GOT MONEY TO BUY MORE.: PATIENT UNABLE TO ANSWER

## 2024-03-09 SDOH — HEALTH STABILITY: MENTAL HEALTH: HOW OFTEN DO YOU HAVE A DRINK CONTAINING ALCOHOL?: MONTHLY OR LESS

## 2024-03-09 SDOH — HEALTH STABILITY: MENTAL HEALTH: HOW OFTEN DO YOU HAVE 6 OR MORE DRINKS ON ONE OCCASION?: NEVER

## 2024-03-09 SDOH — SOCIAL STABILITY: SOCIAL INSECURITY: DO YOU FEEL ANYONE HAS EXPLOITED OR TAKEN ADVANTAGE OF YOU FINANCIALLY OR OF YOUR PERSONAL PROPERTY?: NO

## 2024-03-09 SDOH — SOCIAL STABILITY: SOCIAL INSECURITY: WERE YOU ABLE TO COMPLETE ALL THE BEHAVIORAL HEALTH SCREENINGS?: YES

## 2024-03-09 SDOH — SOCIAL STABILITY: SOCIAL INSECURITY: ABUSE: ADULT

## 2024-03-09 SDOH — ECONOMIC STABILITY: INCOME INSECURITY
IN THE PAST 12 MONTHS, HAS THE ELECTRIC, GAS, OIL, OR WATER COMPANY THREATENED TO SHUT OFF SERVICE IN YOUR HOME?: PATIENT UNABLE TO ANSWER

## 2024-03-09 SDOH — ECONOMIC STABILITY: FOOD INSECURITY
WITHIN THE PAST 12 MONTHS, THE FOOD YOU BOUGHT JUST DIDN'T LAST AND YOU DIDN'T HAVE MONEY TO GET MORE.: PATIENT UNABLE TO ANSWER

## 2024-03-09 SDOH — SOCIAL STABILITY: SOCIAL INSECURITY: HAS ANYONE EVER THREATENED TO HURT YOUR FAMILY OR YOUR PETS?: NO

## 2024-03-09 SDOH — SOCIAL STABILITY: SOCIAL INSECURITY: ARE THERE ANY APPARENT SIGNS OF INJURIES/BEHAVIORS THAT COULD BE RELATED TO ABUSE/NEGLECT?: NO

## 2024-03-09 SDOH — SOCIAL STABILITY: SOCIAL INSECURITY: DOES ANYONE TRY TO KEEP YOU FROM HAVING/CONTACTING OTHER FRIENDS OR DOING THINGS OUTSIDE YOUR HOME?: NO

## 2024-03-09 SDOH — SOCIAL STABILITY: SOCIAL INSECURITY: ARE YOU OR HAVE YOU BEEN THREATENED OR ABUSED PHYSICALLY, EMOTIONALLY, OR SEXUALLY BY ANYONE?: NO

## 2024-03-09 SDOH — SOCIAL STABILITY: SOCIAL INSECURITY: DO YOU FEEL UNSAFE GOING BACK TO THE PLACE WHERE YOU ARE LIVING?: NO

## 2024-03-09 SDOH — SOCIAL STABILITY: SOCIAL INSECURITY: HAVE YOU HAD THOUGHTS OF HARMING ANYONE ELSE?: NO

## 2024-03-09 ASSESSMENT — COGNITIVE AND FUNCTIONAL STATUS - GENERAL
MOVING FROM LYING ON BACK TO SITTING ON SIDE OF FLAT BED WITH BEDRAILS: A LOT
PERSONAL GROOMING: A LOT
DAILY ACTIVITIY SCORE: 11
DRESSING REGULAR LOWER BODY CLOTHING: A LOT
DRESSING REGULAR UPPER BODY CLOTHING: A LOT
EATING MEALS: A LOT
WALKING IN HOSPITAL ROOM: A LOT
MOVING TO AND FROM BED TO CHAIR: A LOT
MOBILITY SCORE: 11
TOILETING: TOTAL
HELP NEEDED FOR BATHING: A LOT
PATIENT BASELINE BEDBOUND: YES
CLIMB 3 TO 5 STEPS WITH RAILING: TOTAL
STANDING UP FROM CHAIR USING ARMS: A LOT
TURNING FROM BACK TO SIDE WHILE IN FLAT BAD: A LOT

## 2024-03-09 ASSESSMENT — ENCOUNTER SYMPTOMS
CONSTIPATION: 0
FLANK PAIN: 0
NAUSEA: 0
LIGHT-HEADEDNESS: 0
FREQUENCY: 0
SHORTNESS OF BREATH: 0
HEADACHES: 0
HEMATURIA: 0
VOMITING: 0
FEVER: 0
PALPITATIONS: 0
WHEEZING: 0
BACK PAIN: 0
NUMBNESS: 0
DYSURIA: 0
FATIGUE: 0
DIARRHEA: 0
MYALGIAS: 0
CHILLS: 0
CHEST TIGHTNESS: 0
COUGH: 0
ARTHRALGIAS: 0
ABDOMINAL PAIN: 0

## 2024-03-09 ASSESSMENT — ACTIVITIES OF DAILY LIVING (ADL)
ADEQUATE_TO_COMPLETE_ADL: YES
LACK_OF_TRANSPORTATION: NO
BATHING: NEEDS ASSISTANCE
TOILETING: NEEDS ASSISTANCE
PATIENT'S MEMORY ADEQUATE TO SAFELY COMPLETE DAILY ACTIVITIES?: YES
FEEDING YOURSELF: NEEDS ASSISTANCE
JUDGMENT_ADEQUATE_SAFELY_COMPLETE_DAILY_ACTIVITIES: YES
DRESSING YOURSELF: NEEDS ASSISTANCE
HEARING - LEFT EAR: FUNCTIONAL
HEARING - RIGHT EAR: FUNCTIONAL
WALKS IN HOME: DEPENDENT
GROOMING: NEEDS ASSISTANCE

## 2024-03-09 ASSESSMENT — LIFESTYLE VARIABLES
HOW MANY STANDARD DRINKS CONTAINING ALCOHOL DO YOU HAVE ON A TYPICAL DAY: PATIENT DOES NOT DRINK
EVER HAD A DRINK FIRST THING IN THE MORNING TO STEADY YOUR NERVES TO GET RID OF A HANGOVER: NO
SKIP TO QUESTIONS 9-10: 1
HOW OFTEN DO YOU HAVE 6 OR MORE DRINKS ON ONE OCCASION: NEVER
AUDIT-C TOTAL SCORE: 0
EVER FELT BAD OR GUILTY ABOUT YOUR DRINKING: NO
HAVE PEOPLE ANNOYED YOU BY CRITICIZING YOUR DRINKING: NO
HOW OFTEN DO YOU HAVE A DRINK CONTAINING ALCOHOL: NEVER
AUDIT-C TOTAL SCORE: 1
AUDIT-C TOTAL SCORE: 0
SKIP TO QUESTIONS 9-10: 1
HAVE YOU EVER FELT YOU SHOULD CUT DOWN ON YOUR DRINKING: NO

## 2024-03-09 ASSESSMENT — PAIN SCALES - GENERAL
PAINLEVEL_OUTOF10: 0 - NO PAIN

## 2024-03-09 ASSESSMENT — PAIN - FUNCTIONAL ASSESSMENT
PAIN_FUNCTIONAL_ASSESSMENT: 0-10

## 2024-03-09 ASSESSMENT — COLUMBIA-SUICIDE SEVERITY RATING SCALE - C-SSRS
1. IN THE PAST MONTH, HAVE YOU WISHED YOU WERE DEAD OR WISHED YOU COULD GO TO SLEEP AND NOT WAKE UP?: NO
6. HAVE YOU EVER DONE ANYTHING, STARTED TO DO ANYTHING, OR PREPARED TO DO ANYTHING TO END YOUR LIFE?: NO
2. HAVE YOU ACTUALLY HAD ANY THOUGHTS OF KILLING YOURSELF?: NO

## 2024-03-09 NOTE — ED TRIAGE NOTES
Pt to ED after falling out of bed from nursing home, un-witnessed, pt is on Eliquis, blood glucose was 43, pt is a known diabetic. -LOC.

## 2024-03-09 NOTE — H&P
History Of Present Illness  Bing Holliday is a 63 y.o. female presenting with After a fall.    It seems when her son checked on her this morning her glucose was about 43 and was not very responsive when he left the room to get her some juice he heard her fall out of bed and that is when the ambulance was called.    Prior to this the patient denies any complains of fever, chills, nausea, vomiting, chest pain, shortness of breath, abdominal pain, change in bowel habits or urinary complaints.    On review of the ambulance record it seems she received D10 for hypoglycemia and when they were examining her she was not endorsing any pain or obvious injuries such as neck or back pain.  No pain on palpation no neurological deficits.    Per Discharge Summary for Hospitalization 11/20-11/27:  Presented to the [facility] ED on 11/21 with worsening generalized weakness, confusion, and hypoglycemia and admitted for Adrenal crisis secondary to Norovirus. Patient presented to ED confused, but HDS w/ BP of 101/68 and glucose of 58. Had been complaining of diarrhea multiple times per hour, found to be norovirus positive, C. diff negative. UA wo esterases/WBCs, tx w/ 1d CTX, d/riccardo d/t lack of evidence for infxn. Diarrhea gradually resolved. Endo c/s and d/c patient's fludrocortisone as patient did not have primary AI. Also had 2 episodes of asymptomatic hypoglycemia tx w/ glucagon, but no further episodes after endocrinology adjusted her prednisone regimen (changed to 10mg QAM upon waking, 5mg @2pm daily). Endocrinologist suspected patient may be a hyper-metabolizer of prednisone, or malnourished w/ depleted glycogen stores. Discharged w/ instructions to f/u w PCP and endocrinologist in [place] (Dr. Chi).     Per Discharge Summary for Hospitalization 12/17-12/23:  Presented to [facility] ED on 12/17 for Glucose 68, incidentally found to be hyperkalemic, so admitted for further workup and monitoring. HyperK resolved w/ Kayexalate/calcium  gluconate. Found to have UTI w/ mixed antonio on UCx, but prior urine cx had Serratia as predominant species w/ susceptibility to CTX, so she was tx w/ CTX. Initially encephalopathic 2/2 to UTI, improved w/ tx. There may have also been a significant component of AI, as increase in prednisone dose also improved symptoms of mental status dysfunction, but mental status was slow to recover overall. Of note, patient also had episode of epistaxis over R nare that required packing and ENT consultation. Temporarily d/riccardo Eliquis x7d at recommendation of ENT w/ plan to f/u o/p w/ ENT for removal of nasal packing. Discharge summary written 12/21, but precert approved 12/23, so discharged to SNF on that date. ENT evaluated o/p and rec'd restarting Eliquis on 1/7/24.      OSH Course for 1/14-1/17:  Presented from SNF to [facility] ED on 1/14/2024 for complaints of confusion and weakness with upper and low back pain. In the nursing home, she had 2d hx of weakness, worsening confusion, hallucinations (speaking to people who were not there), inability to ambulate by self, reports of chills. At the time of admission, denied sick contacts, missed medication doses, but had taken an extra dose of home prednisone because her rheumatologist told her to do that to prevent episodes of worsening SLE symptoms.    OSH course for 1/17-1/27  Admitted for altered mental status, metabolic disturbance due to adrenal insufficiency.  MRI Brain w and w/o contrast demonstrated focal encephalomalacia in L occipital region unchanged from previous exam. Hemosiderin deposit in L occipital region consistent w/ remote infarction/contusion. Scattered foci w/ increased signal in subcortical and periventricular white matter consistent w/ demyelination (new finding). On 1/16 PM, patient had clinically deteriorated, AOx0, no longer nodding/shaking head to questions, diffusely sensitive to light palpation over whole body. Neuro c/s on 1/16 for continuous facial  twitching while awake w/ rapid eye blinking, lip smacking, which was refractory to Ativan, who rec'd vEEG, keppra load and maintenance, Ativan, lumbar puncture, and transfer to ICU for closer monitoring. Continuous EEG demonstrated status epilepticus for which patient as tx w/ Keppra/Ativan. Per endocrine, low concern for adrenal crisis causing acute presentation. CSF glucose, protein, cultures and HSV PCR negative, so stopped empiric meningitis tx and acyclovir per ID. AMS at this point believed to be delirium s/t multiple admissions (since November) and seizures. Plan to continue keppra on DC. Transitioned to home PO prednisone 15 mg every day, apixaban 2.5 mg BID, and Mycophenylate 1000 mg BID. Discharged to [facility] on 1/27/24 w/ follow up with rheumatology referral in next month    ED course  Vitals: 30.5 Celsius, 52 beats minute, 16 breaths minute, 130/73 mmHg maintain saturation room air    Labs: BUN/creatinine 39/1.56, troponin 20 - >18, TSH 2.93 (normal), PT/INR normal, hemoglobin/hematocrit 7.9/27.3, platelets 78, VBG 7.2 2/57/32/20 3.3, UA protein 2+, UDS negative    Imaging  CT head without contrast: No evidence of acute cortical infarct or intracranial hemorrhage    CT C-spine: No evidence of acute fracture or subluxation with degenerative changes at multiple levels as above  Send intervention    Admitted for further management.     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   Respiratory:  Negative for cough, chest tightness, shortness of breath and wheezing.    Cardiovascular:  Negative for chest pain, palpitations and leg swelling.   Gastrointestinal:  Negative for abdominal pain, constipation, diarrhea, nausea and vomiting.   Genitourinary:  Negative  for dysuria, flank pain, frequency, hematuria and urgency.   Musculoskeletal:  Negative for arthralgias, back pain and myalgias.   Neurological:  Negative for light-headedness, numbness and headaches.        Physical Exam  Constitutional:       Appearance: Normal appearance.   HENT:      Head: Normocephalic.   Cardiovascular:      Rate and Rhythm: Normal rate and regular rhythm.      Heart sounds: Normal heart sounds. No murmur heard.  Pulmonary:      Effort: No respiratory distress.      Breath sounds: Normal breath sounds. No wheezing, rhonchi or rales.   Abdominal:      Palpations: Abdomen is soft.      Tenderness: There is no abdominal tenderness. There is no guarding.   Musculoskeletal:         General: No swelling or tenderness.      Right lower leg: No edema.      Left lower leg: No edema.   Skin:     General: Skin is dry.      Capillary Refill: Capillary refill takes less than 2 seconds.   Neurological:      General: No focal deficit present.      Mental Status: She is alert and oriented to person, place, and time.   Psychiatric:         Mood and Affect: Mood normal.          Relevant Results  Results for orders placed or performed during the hospital encounter of 03/09/24 (from the past 24 hour(s))   POCT GLUCOSE   Result Value Ref Range    POCT Glucose 76 74 - 99 mg/dL   CBC and Auto Differential   Result Value Ref Range    WBC 7.5 4.4 - 11.3 x10*3/uL    nRBC 0.5 (H) 0.0 - 0.0 /100 WBCs    RBC 2.64 (L) 4.00 - 5.20 x10*6/uL    Hemoglobin 7.9 (L) 12.0 - 16.0 g/dL    Hematocrit 27.3 (L) 36.0 - 46.0 %     (H) 80 - 100 fL    MCH 29.9 26.0 - 34.0 pg    MCHC 28.9 (L) 32.0 - 36.0 g/dL    RDW 22.1 (H) 11.5 - 14.5 %    Platelets 78 (L) 150 - 450 x10*3/uL    Immature Granulocytes %, Automated 0.8 0.0 - 0.9 %    Immature Granulocytes Absolute, Automated 0.06 0.00 - 0.70 x10*3/uL   Comprehensive Metabolic Panel   Result Value Ref Range    Glucose 120 (H) 74 - 99 mg/dL    Sodium 142 136 - 145 mmol/L    Potassium  4.4 3.5 - 5.3 mmol/L    Chloride 110 (H) 98 - 107 mmol/L    Bicarbonate 23 21 - 32 mmol/L    Anion Gap 13 10 - 20 mmol/L    Urea Nitrogen 39 (H) 6 - 23 mg/dL    Creatinine 1.56 (H) 0.50 - 1.05 mg/dL    eGFR 37 (L) >60 mL/min/1.73m*2    Calcium 8.6 8.6 - 10.3 mg/dL    Albumin 3.5 3.4 - 5.0 g/dL    Alkaline Phosphatase 109 33 - 136 U/L    Total Protein 6.0 (L) 6.4 - 8.2 g/dL    AST 29 9 - 39 U/L    Bilirubin, Total 0.4 0.0 - 1.2 mg/dL    ALT 28 7 - 45 U/L   Alcohol   Result Value Ref Range    Alcohol <10 <=10 mg/dL   Lactate   Result Value Ref Range    Lactate 1.8 0.4 - 2.0 mmol/L   Protime-INR   Result Value Ref Range    Protime 11.0 9.8 - 12.8 seconds    INR 1.0 0.9 - 1.1   Type And Screen   Result Value Ref Range    ABO TYPE O     Rh TYPE POS     ANTIBODY SCREEN NEG    hCG, quantitative, pregnancy   Result Value Ref Range    HCG, Beta-Quantitative 4 <5 mIU/mL   BLOOD GAS VENOUS   Result Value Ref Range    POCT pH, Venous 7.22 (LL) 7.33 - 7.43 pH    POCT pCO2, Venous 57 (H) 41 - 51 mm Hg    POCT pO2, Venous 32 (L) 35 - 45 mm Hg    POCT SO2, Venous 43 (L) 45 - 75 %    POCT Oxy Hemoglobin, Venous 42.5 (L) 45.0 - 75.0 %    POCT Base Excess, Venous -5.2 (L) -2.0 - 3.0 mmol/L    POCT HCO3 Calculated, Venous 23.3 22.0 - 26.0 mmol/L    Patient Temperature      FiO2 21 %    Critical Called By IDALIA HECTOR     Critical Called To DR. HIGINIO ESCALANTE     Critical Call Time 747.0000     Critical Read Back Y    Troponin I, High Sensitivity, Initial   Result Value Ref Range    Troponin I, High Sensitivity 20 (H) 0 - 13 ng/L   Manual Differential   Result Value Ref Range    Neutrophils %, Manual 89.0 40.0 - 80.0 %    Bands %, Manual 3.0 0.0 - 5.0 %    Lymphocytes %, Manual 5.0 13.0 - 44.0 %    Monocytes %, Manual 3.0 2.0 - 10.0 %    Eosinophils %, Manual 0.0 0.0 - 6.0 %    Basophils %, Manual 0.0 0.0 - 2.0 %    Seg Neutrophils Absolute, Manual 6.68 1.20 - 7.00 x10*3/uL    Bands Absolute, Manual 0.23 0.00 - 0.70 x10*3/uL    Lymphocytes  Absolute, Manual 0.38 (L) 1.20 - 4.80 x10*3/uL    Monocytes Absolute, Manual 0.23 0.10 - 1.00 x10*3/uL    Eosinophils Absolute, Manual 0.00 0.00 - 0.70 x10*3/uL    Basophils Absolute, Manual 0.00 0.00 - 0.10 x10*3/uL    Total Cells Counted 100     Neutrophils Absolute, Manual 6.91 1.20 - 7.70 x10*3/uL    RBC Morphology See Below     Polychromasia Mild     RBC Fragments Few     Target Cells Few     Ovalocytes Few     Katarzyna Cells Few     Acanthocytes Few     Giant Platelets Few    POCT GLUCOSE   Result Value Ref Range    POCT Glucose 144 (H) 74 - 99 mg/dL   Troponin, High Sensitivity, 1 Hour   Result Value Ref Range    Troponin I, High Sensitivity 18 (H) 0 - 13 ng/L   Blood Culture    Specimen: Peripheral Venipuncture; Blood culture   Result Value Ref Range    Blood Culture Loaded on Instrument - Culture in progress    Blood Culture    Specimen: Peripheral Venipuncture; Blood culture   Result Value Ref Range    Blood Culture Loaded on Instrument - Culture in progress    TSH with reflex to Free T4 if abnormal   Result Value Ref Range    Thyroid Stimulating Hormone 2.93 0.44 - 3.98 mIU/L   Urinalysis with Reflex Culture and Microscopic   Result Value Ref Range    Color, Urine Yellow Straw, Yellow    Appearance, Urine Hazy (N) Clear    Specific Gravity, Urine 1.013 1.005 - 1.035    pH, Urine 5.0 5.0, 5.5, 6.0, 6.5, 7.0, 7.5, 8.0    Protein, Urine 100 (2+) (N) NEGATIVE mg/dL    Glucose, Urine NEGATIVE NEGATIVE mg/dL    Blood, Urine NEGATIVE NEGATIVE    Ketones, Urine NEGATIVE NEGATIVE mg/dL    Bilirubin, Urine NEGATIVE NEGATIVE    Urobilinogen, Urine <2.0 <2.0 mg/dL    Nitrite, Urine NEGATIVE NEGATIVE    Leukocyte Esterase, Urine NEGATIVE NEGATIVE   Drug Screen, Urine   Result Value Ref Range    Amphetamine Screen, Urine Presumptive Negative Presumptive Negative    Barbiturate Screen, Urine Presumptive Negative Presumptive Negative    Benzodiazepines Screen, Urine Presumptive Negative Presumptive Negative    Cannabinoid  Screen, Urine Presumptive Negative Presumptive Negative    Cocaine Metabolite Screen, Urine Presumptive Negative Presumptive Negative    Fentanyl Screen, Urine Presumptive Negative Presumptive Negative    Opiate Screen, Urine Presumptive Negative Presumptive Negative    Oxycodone Screen, Urine Presumptive Negative Presumptive Negative    PCP Screen, Urine Presumptive Negative Presumptive Negative    Methadone Screen, Urine Presumptive Negative Presumptive Negative   Urinalysis Microscopic   Result Value Ref Range    WBC, Urine 21-50 (A) 1-5, NONE /HPF    RBC, Urine 1-2 NONE, 1-2, 3-5 /HPF    Bacteria, Urine 1+ (A) NONE SEEN /HPF   POCT GLUCOSE   Result Value Ref Range    POCT Glucose 92 74 - 99 mg/dL     Scheduled medications    Continuous medications    PRN medications        Assessment/Plan   Suspected adrenal insufficiency   Hypothermia  - Maintain bear hugger at 43C  - Hydrocortisone 100 mg bolus x 1  - Hydrocortisone 50 mg every 6 hours  - Endocrine consult  - Follow-up blood cultures and urine cultures (3/9)    IDDM 2  History of hypoglycemia  - Hypoglycemia protocol  - Insulin sliding scale    Atrial fibrillation on Eliquis  - Continue home Eliquis 2.5 mg twice daily    History of SLE cerebritis  Andrew arthropathy  - Continue home mycophenolate Moffa 200 mg twice daily  - Follow-up anti-double-stranded DNA    History of seizures  - Continue home Keppra 500 mg twice daily    COPD not in exacerbation  - DuoNebs every 6 hours  - DuoNebs every 4 hours as needed for shortness of breath    DVT prophylaxis: Eliquis   Diet: Diabetic   Consults: Endocrine  CODE STATUS: FULL     Disposition: Admitted for suspected renal insufficiency with hypothermia with pending endocrine consult recommendations    Treasure Woo MD   PGY 3 [facility]

## 2024-03-09 NOTE — NURSING NOTE
Pt arrived to room 2109, cleaned and repositioned.  New pure wick applied.  Allan hugger reapplied.  Pt on monitor.  BG rechecked.   at bedside and aware of result.

## 2024-03-09 NOTE — CARE PLAN
The patient's goals for the shift include      The clinical goals for the shift include maintain blood glucose level above 100, increase in temperature    Over the shift, temp is increasing, blood glucose level is stable

## 2024-03-09 NOTE — ED PROVIDER NOTES
HPI   Chief Complaint   Patient presents with    Fall       63-year-old female to emergency department as a head injury on anticoagulation limited trauma.  Patient was found in bed with a low glucose by her family and they went to go get her something to help her sugar and when they came back she had rolled out of bed.  They are unsure if she hit her head.  She is on Eliquis.  Patient is obtunded on arrival.  She will open her eyes.  She can tell me her age being 63.  Her glucose was still somewhat low on arrival.  She was still getting her D10 infusion on arrival.    Past Medical History: COPD, lupus, hypertension, A-fib, anemia    Surgical History: Unknown    Medications: See Nursing Notes    Allergies: See nurses notes    Social History:     Unless otherwise stated in this report the patient's positive and negative responses for review of systems for constitutional, eyes, ENT, cardiovascular, respiratory, gastrointestinal, neurological, genitourinary, musculoskeletal, and integument systems and related systems to the presenting problem are either as stated in the HPI or were not pertinent or were negative for the symptoms and/or complaints related to the presenting medical problem.      EXAM:  Vital signs reviewed  General:  Appears well  Alert  HEENT:  Head atraumatic  Eyes normal inspection  Normal ENT inspection  NECK:  Normal inspection  RESPIRATORY:  Normal breath sounds  No chest wall tenderness  No respiratory distress  CVS:  Heart rate and rhythm regular  No Murmurs  ABDOMEN/GI:  Soft  Non-tender  Bowel sounds normal  FEMALE GENITAL:  []  MALE GENITAL:  []  RECTAL:  []  BACK:  Normal inspection  EXTREMITIES:  Non-Tender  Full ROM  Normal appearance  NEURO:  Alert to place and age and person  CN's normal as tested  Sensation normal  Motor normal  PSYCH:  Mood normal  Affect normal  SKIN:  Color normal  No rash  Warm  Dry                                  Nasim Coma Scale Score: 14                      Patient History   Past Medical History:   Diagnosis Date    Acute upper respiratory infection, unspecified 03/04/2020    Acute URI    Acute upper respiratory infection, unspecified 09/30/2015    URTI (acute upper respiratory infection)    Arthritis     Body mass index (BMI) 23.0-23.9, adult 10/15/2021    BMI 23.0-23.9, adult    Body mass index (BMI) 33.0-33.9, adult 03/04/2020    BMI 33.0-33.9,adult    Cardiomegaly 08/27/2013    Left ventricular hypertrophy    Chronic kidney disease, stage 3 unspecified (CMS/HCC) 07/02/2013    Chronic kidney disease, stage III (moderate)    Disease of pericardium, unspecified 07/02/2013    Pericardial disease    Encounter for follow-up examination after completed treatment for conditions other than malignant neoplasm 10/06/2022    Hospital discharge follow-up    Generalized contraction of visual field, right eye 01/29/2015    Generalized contraction of visual field of right eye    Homonymous bilateral field defects, right side 04/29/2016    Homonymous bilateral field defects of right side    Hypertensive chronic kidney disease with stage 1 through stage 4 chronic kidney disease, or unspecified chronic kidney disease 07/02/2013    Nephrosclerosis    Laceration without foreign body, left foot, initial encounter 07/03/2018    Foot laceration, left, initial encounter    Migraine with aura, not intractable, without status migrainosus 10/24/2022    Ocular migraine    Other conditions influencing health status 07/02/2013    Chronic Glomerulonephritis In Diseases Classified Elsewhere    Other conditions influencing health status 07/02/2013    Progressive Familial Myoclonic Epilepsy    Other conditions influencing health status 07/02/2013    Protein S Deficiency    Other conditions influencing health status 05/22/2015    Familial Combined Hyperlipidemia    Other conditions influencing health status 10/24/2022    IDDM (insulin dependent diabetes mellitus)    Other conditions influencing  health status 03/14/2022    Diabetes mellitus, insulin dependent (IDDM), uncontrolled    Other long term (current) drug therapy 10/24/2022    Long-term use of Plaquenil    Personal history of diseases of the blood and blood-forming organs and certain disorders involving the immune mechanism 07/02/2013    History of thrombocytopenia    Personal history of diseases of the skin and subcutaneous tissue 08/11/2015    History of foot ulcer    Personal history of nephrotic syndrome 07/02/2013    History of nephrotic syndrome    Personal history of other diseases of the circulatory system 08/27/2013    History of sinus tachycardia    Personal history of other diseases of the nervous system and sense organs     History of cataract    Personal history of other diseases of the respiratory system     History of bronchitis    Personal history of other infectious and parasitic diseases 07/02/2013    History of hepatitis    Personal history of other specified conditions     History of shortness of breath    Personal history of other specified conditions 08/27/2013    History of edema    Puckering of macula, right eye 10/24/2022    ERM OD (epiretinal membrane, right eye)    Raynaud's syndrome without gangrene 07/02/2013    Raynaud's disease    Systemic lupus erythematosus, unspecified (CMS/[facility]) 07/24/2015    SLE (systemic lupus erythematosus)    Systemic lupus erythematosus, unspecified (CMS/[facility]) 07/24/2015    SLE (systemic lupus erythematosus)    Systemic lupus erythematosus, unspecified (CMS/[facility]) 07/24/2015    Systemic lupus    Type 2 diabetes mellitus with diabetic nephropathy (CMS/[facility]) 07/02/2013    Type 2 diabetes with nephropathy    Type 2 diabetes mellitus with mild nonproliferative diabetic retinopathy without macular edema, left eye (CMS/[facility]) 07/27/2015    Non-proliferative diabetic retinopathy, left eye    Type 2 diabetes mellitus with mild nonproliferative diabetic retinopathy without macular edema, unspecified eye  (CMS/HCC) 07/24/2015    Mild non proliferative diabetic retinopathy    Type 2 diabetes mellitus with proliferative diabetic retinopathy without macular edema, right eye (CMS/HCC) 07/27/2015    Proliferative diabetic retinopathy of right eye    Type 2 diabetes mellitus with proliferative diabetic retinopathy without macular edema, unspecified eye (CMS/HCC) 07/24/2015    Diabetic proliferative retinopathy    Unspecified acute and subacute iridocyclitis 07/24/2015    Acute iritis, right eye    Unspecified open wound, left foot, sequela 07/03/2018    Wound, open, foot, left, sequela     Past Surgical History:   Procedure Laterality Date    ANKLE SURGERY  01/29/2015    Ankle Surgery    CHOLECYSTECTOMY  01/29/2015    Cholecystectomy    CT GUIDED PERCUTANEOUS BIOPSY BONE DEEP  5/4/2021    CT GUIDED PERCUTANEOUS BIOPSY BONE DEEP 5/4/2021 [facility] CLINICAL LEGACY    EYE SURGERY  03/06/2015    Eye Surgery    FOOT SURGERY  01/29/2015    Foot Surgery    MR HEAD ANGIO WO IV CONTRAST  7/26/2013    MR HEAD ANGIO WO IV CONTRAST 7/26/2013 [facility] CLINICAL LEGACY    MR HEAD ANGIO WO IV CONTRAST  9/17/2021    MR HEAD ANGIO WO IV CONTRAST 9/17/2021 [facility]GAC[facility]    MR HEAD ANGIO WO IV CONTRAST  3/25/2023    MR HEAD ANGIO WO IV CONTRAST [facility]    MR NECK ANGIO WO IV CONTRAST  7/26/2013    MR NECK ANGIO WO IV CONTRAST 7/26/2013 [facility] CLINICAL LEGACY    MR NECK ANGIO WO IV CONTRAST  9/17/2021    MR NECK ANGIO WO IV CONTRAST 9/17/2021 [facility]    MR NECK ANGIO WO IV CONTRAST  3/25/2023    MR NECK ANGIO WO IV CONTRAST [facility]    OTHER SURGICAL HISTORY  01/29/2015    Creation Of Pericardial Window    OTHER SURGICAL HISTORY  01/29/2015    Quadricepsplasty    TOTAL HIP ARTHROPLASTY  01/29/2015    Hip Replacement     Family History   Problem Relation Name Age of Onset    Other (RENAL DISEASE) Mother          END STAGE    Other (CARDIAC DISORDER) Mother      Cataracts Mother      Stroke Mother      Diabetes Mother      Kidney disease  Mother      Lupus Mother      Other (CARDIAC DISORDER) Father      COPD Father      Glaucoma Father      Hypertension Father      Sleep apnea Father      Other (CARDIAC DISORDER) Sister      Depression Sister      Kidney disease Sister      Sickle cell trait Sister      Sleep apnea Daughter       Social History     Tobacco Use    Smoking status: Never     Passive exposure: Never    Smokeless tobacco: Never   Vaping Use    Vaping Use: Never used   Substance Use Topics    Alcohol use: Not Currently     Comment: RARE    Drug use: Never       Physical Exam   ED Triage Vitals   Temperature Heart Rate Respirations BP   03/09/24 0727 03/09/24 0727 03/09/24 0727 03/09/24 0727   34.5 °C (94.1 °F) (!) 49 18 134/77      Pulse Ox Temp Source Heart Rate Source Patient Position   03/09/24 0727 03/09/24 0819 03/09/24 0832 03/09/24 0832   99 % Rectal Monitor Lying      BP Location FiO2 (%)     -- --             Physical Exam    ED Course & MDM   Diagnoses as of 03/09/24 2248   Hypothermia due to non-environmental cause       Medical Decision Making  EKG: Sinus bradycardia with a rate of 47.  KS interval 192.  QTc 460.  No ST changes.    Patient is persistently bradycardic.  She is hypothermic here.  Rectal temperature was 87 °F.    No leukocytosis.  There is some mild anemia with a hemoglobin of 7.9.  Pregnancy is negative.  Initial troponin is indeterminate at 20.  Glucose improved 144.  Lactate was normal.  There is some renal failure with a BUN of 39 and a creatinine of 1.56.  Hemoglobin and kidney function are at baseline.    Due to the hypothermia, sepsis lab work is added.  We also added thyroid function.    Patient became more awake and alert as her sugar came up.  I reassessed her at 9 AM.  She is denying any chest pain or shortness of breath.  No nausea or vomiting or diarrhea.  No abdominal pain.  No headaches.  No fevers.  No upper respiratory infection symptoms.  She had no complaints.  She is very soft-spoken, but  appropriate when asking her questions.  She did not know the month or the year.  She knew she was at the hospital and her age and her name.    Patient hypothermia was not improving initially.  We did discuss the case with the hospitalist who recommended critical care.  We discussed the case with critical care and the patient will go to stepdown.  Temperature did eventually start to climb.    CT head and cervical spine had no acute findings.    Case was discussed with the hospitalist and she states the patient has had similar episodes in the past and was evaluated for them.  Patient is excepted to the hospitalist service.        Procedure  Procedures     López Carrero DO  03/09/24 7178

## 2024-03-10 PROBLEM — R60.0 LOCALIZED EDEMA: Status: RESOLVED | Noted: 2024-03-10 | Resolved: 2024-03-10

## 2024-03-10 PROBLEM — G50.1 ATYPICAL FACIAL PAIN: Status: RESOLVED | Noted: 2024-03-10 | Resolved: 2024-03-10

## 2024-03-10 PROBLEM — H91.90 HEARING LOSS: Status: RESOLVED | Noted: 2024-03-10 | Resolved: 2024-03-10

## 2024-03-10 PROBLEM — M54.50 ACUTE LOW BACK PAIN: Status: RESOLVED | Noted: 2023-10-30 | Resolved: 2024-03-10

## 2024-03-10 PROBLEM — Z86.69 HISTORY OF CATARACT: Status: RESOLVED | Noted: 2024-03-10 | Resolved: 2024-03-10

## 2024-03-10 PROBLEM — R41.0 DELIRIUM: Status: RESOLVED | Noted: 2024-03-10 | Resolved: 2024-03-10

## 2024-03-10 PROBLEM — I12.9 NEPHROSCLEROSIS: Status: ACTIVE | Noted: 2022-06-10

## 2024-03-10 PROBLEM — R05.3 PERSISTENT COUGH: Status: ACTIVE | Noted: 2022-11-13

## 2024-03-10 PROBLEM — Z86.16 PERSONAL HISTORY OF COVID-19: Status: RESOLVED | Noted: 2023-04-04 | Resolved: 2024-03-10

## 2024-03-10 PROBLEM — N25.81 HYPERPARATHYROIDISM DUE TO RENAL INSUFFICIENCY (MULTI): Status: ACTIVE | Noted: 2024-03-10

## 2024-03-10 PROBLEM — E66.3 OVERWEIGHT WITH BODY MASS INDEX (BMI) 25.0-29.9: Status: RESOLVED | Noted: 2024-03-10 | Resolved: 2024-03-10

## 2024-03-10 PROBLEM — F44.89 CONFUSIONAL STATE: Status: RESOLVED | Noted: 2024-03-10 | Resolved: 2024-03-10

## 2024-03-10 PROBLEM — R22.9 MASS OF SKIN: Status: RESOLVED | Noted: 2024-03-10 | Resolved: 2024-03-10

## 2024-03-10 PROBLEM — R04.0 POSTERIOR EPISTAXIS: Status: RESOLVED | Noted: 2024-03-10 | Resolved: 2024-03-10

## 2024-03-10 PROBLEM — I51.7 LEFT VENTRICULAR HYPERTROPHY: Status: ACTIVE | Noted: 2024-03-10

## 2024-03-10 PROBLEM — M79.676 PAIN OF TOE: Status: RESOLVED | Noted: 2024-03-10 | Resolved: 2024-03-10

## 2024-03-10 PROBLEM — S91.302A OPEN WOUND OF LEFT FOOT: Status: RESOLVED | Noted: 2024-03-10 | Resolved: 2024-03-10

## 2024-03-10 PROBLEM — F41.9 ANXIETY: Status: ACTIVE | Noted: 2024-03-10

## 2024-03-10 PROBLEM — I73.00 RAYNAUD'S DISEASE: Status: ACTIVE | Noted: 2024-03-10

## 2024-03-10 PROBLEM — R07.9 CHEST PAIN: Status: RESOLVED | Noted: 2022-06-10 | Resolved: 2024-03-10

## 2024-03-10 PROBLEM — Z86.2 HISTORY OF THROMBOCYTOPENIA: Status: RESOLVED | Noted: 2024-03-10 | Resolved: 2024-03-10

## 2024-03-10 PROBLEM — T14.8XXA OPEN WOUND: Status: RESOLVED | Noted: 2024-03-10 | Resolved: 2024-03-10

## 2024-03-10 PROBLEM — E66.9 OBESITY: Status: ACTIVE | Noted: 2024-03-10

## 2024-03-10 PROBLEM — R25.1 TREMOR: Status: RESOLVED | Noted: 2024-03-10 | Resolved: 2024-03-10

## 2024-03-10 PROBLEM — R51.9 ACUTE HEADACHE: Status: RESOLVED | Noted: 2024-03-10 | Resolved: 2024-03-10

## 2024-03-10 PROBLEM — N28.9 ABNORMAL KIDNEY FUNCTION: Status: RESOLVED | Noted: 2023-04-04 | Resolved: 2024-03-10

## 2024-03-10 PROBLEM — J32.9 SINUSITIS: Status: RESOLVED | Noted: 2024-03-10 | Resolved: 2024-03-10

## 2024-03-10 PROBLEM — D50.0 IRON DEFICIENCY ANEMIA DUE TO CHRONIC BLOOD LOSS: Status: ACTIVE | Noted: 2024-03-10

## 2024-03-10 PROBLEM — Z20.822 CONTACT WITH AND (SUSPECTED) EXPOSURE TO COVID-19: Status: RESOLVED | Noted: 2023-04-04 | Resolved: 2024-03-10

## 2024-03-10 LAB
ALBUMIN SERPL BCP-MCNC: 3 G/DL (ref 3.4–5)
ALP SERPL-CCNC: 98 U/L (ref 33–136)
ALT SERPL W P-5'-P-CCNC: 23 U/L (ref 7–45)
ANION GAP BLDA CALCULATED.4IONS-SCNC: 8 MMO/L (ref 10–25)
ANION GAP SERPL CALC-SCNC: 14 MMOL/L (ref 10–20)
AST SERPL W P-5'-P-CCNC: 25 U/L (ref 9–39)
BASE EXCESS BLDA CALC-SCNC: 1 MMOL/L (ref -2–3)
BILIRUB SERPL-MCNC: 0.4 MG/DL (ref 0–1.2)
BODY TEMPERATURE: ABNORMAL
BUN SERPL-MCNC: 34 MG/DL (ref 6–23)
CA-I BLDA-SCNC: 1.28 MMOL/L (ref 1.1–1.33)
CALCIUM SERPL-MCNC: 8.5 MG/DL (ref 8.6–10.3)
CHLORIDE BLDA-SCNC: 115 MMOL/L (ref 98–107)
CHLORIDE SERPL-SCNC: 113 MMOL/L (ref 98–107)
CO2 SERPL-SCNC: 22 MMOL/L (ref 21–32)
CREAT SERPL-MCNC: 1.68 MG/DL (ref 0.5–1.05)
DSDNA AB SER-ACNC: <1 IU/ML
EGFRCR SERPLBLD CKD-EPI 2021: 34 ML/MIN/1.73M*2
ERYTHROCYTE [DISTWIDTH] IN BLOOD BY AUTOMATED COUNT: 21.7 % (ref 11.5–14.5)
FLUAV RNA RESP QL NAA+PROBE: NOT DETECTED
FLUBV RNA RESP QL NAA+PROBE: NOT DETECTED
GLUCOSE BLD MANUAL STRIP-MCNC: 105 MG/DL (ref 74–99)
GLUCOSE BLD MANUAL STRIP-MCNC: 116 MG/DL (ref 74–99)
GLUCOSE BLD MANUAL STRIP-MCNC: 139 MG/DL (ref 74–99)
GLUCOSE BLD MANUAL STRIP-MCNC: 90 MG/DL (ref 74–99)
GLUCOSE BLD MANUAL STRIP-MCNC: 94 MG/DL (ref 74–99)
GLUCOSE BLDA-MCNC: 92 MG/DL (ref 74–99)
GLUCOSE SERPL-MCNC: 91 MG/DL (ref 74–99)
HCO3 BLDA-SCNC: 25 MMOL/L (ref 22–26)
HCT VFR BLD AUTO: 25.7 % (ref 36–46)
HCT VFR BLD EST: 23 % (ref 36–46)
HGB BLD-MCNC: 7.3 G/DL (ref 12–16)
HGB BLDA-MCNC: 7.8 G/DL (ref 12–16)
HOLD SPECIMEN: NORMAL
INHALED O2 CONCENTRATION: 21 %
LACTATE BLDA-SCNC: 0.5 MMOL/L (ref 0.4–2)
MCH RBC QN AUTO: 29.4 PG (ref 26–34)
MCHC RBC AUTO-ENTMCNC: 28.4 G/DL (ref 32–36)
MCV RBC AUTO: 104 FL (ref 80–100)
NRBC BLD-RTO: 0.7 /100 WBCS (ref 0–0)
OXYHGB MFR BLDA: 94.3 % (ref 94–98)
PCO2 BLDA: 36 MM HG (ref 38–42)
PH BLDA: 7.45 PH (ref 7.38–7.42)
PLATELET # BLD AUTO: 73 X10*3/UL (ref 150–450)
PO2 BLDA: 71 MM HG (ref 85–95)
POTASSIUM BLDA-SCNC: 4.9 MMOL/L (ref 3.5–5.3)
POTASSIUM SERPL-SCNC: 5.2 MMOL/L (ref 3.5–5.3)
PROT SERPL-MCNC: 5.5 G/DL (ref 6.4–8.2)
RBC # BLD AUTO: 2.48 X10*6/UL (ref 4–5.2)
SAO2 % BLDA: 95 % (ref 94–100)
SARS-COV-2 RNA RESP QL NAA+PROBE: NOT DETECTED
SODIUM BLDA-SCNC: 143 MMOL/L (ref 136–145)
SODIUM SERPL-SCNC: 144 MMOL/L (ref 136–145)
WBC # BLD AUTO: 6.1 X10*3/UL (ref 4.4–11.3)

## 2024-03-10 PROCEDURE — 84132 ASSAY OF SERUM POTASSIUM: CPT | Performed by: STUDENT IN AN ORGANIZED HEALTH CARE EDUCATION/TRAINING PROGRAM

## 2024-03-10 PROCEDURE — 2500000002 HC RX 250 W HCPCS SELF ADMINISTERED DRUGS (ALT 637 FOR MEDICARE OP, ALT 636 FOR OP/ED): Performed by: STUDENT IN AN ORGANIZED HEALTH CARE EDUCATION/TRAINING PROGRAM

## 2024-03-10 PROCEDURE — 99233 SBSQ HOSP IP/OBS HIGH 50: CPT | Performed by: STUDENT IN AN ORGANIZED HEALTH CARE EDUCATION/TRAINING PROGRAM

## 2024-03-10 PROCEDURE — 80053 COMPREHEN METABOLIC PANEL: CPT | Performed by: STUDENT IN AN ORGANIZED HEALTH CARE EDUCATION/TRAINING PROGRAM

## 2024-03-10 PROCEDURE — 87632 RESP VIRUS 6-11 TARGETS: CPT | Performed by: STUDENT IN AN ORGANIZED HEALTH CARE EDUCATION/TRAINING PROGRAM

## 2024-03-10 PROCEDURE — 36600 WITHDRAWAL OF ARTERIAL BLOOD: CPT

## 2024-03-10 PROCEDURE — 87636 SARSCOV2 & INF A&B AMP PRB: CPT | Performed by: STUDENT IN AN ORGANIZED HEALTH CARE EDUCATION/TRAINING PROGRAM

## 2024-03-10 PROCEDURE — 2500000001 HC RX 250 WO HCPCS SELF ADMINISTERED DRUGS (ALT 637 FOR MEDICARE OP): Performed by: STUDENT IN AN ORGANIZED HEALTH CARE EDUCATION/TRAINING PROGRAM

## 2024-03-10 PROCEDURE — 2500000004 HC RX 250 GENERAL PHARMACY W/ HCPCS (ALT 636 FOR OP/ED): Performed by: STUDENT IN AN ORGANIZED HEALTH CARE EDUCATION/TRAINING PROGRAM

## 2024-03-10 PROCEDURE — 2060000001 HC INTERMEDIATE ICU ROOM DAILY

## 2024-03-10 PROCEDURE — 85027 COMPLETE CBC AUTOMATED: CPT | Performed by: STUDENT IN AN ORGANIZED HEALTH CARE EDUCATION/TRAINING PROGRAM

## 2024-03-10 PROCEDURE — 82947 ASSAY GLUCOSE BLOOD QUANT: CPT

## 2024-03-10 PROCEDURE — 36415 COLL VENOUS BLD VENIPUNCTURE: CPT | Performed by: STUDENT IN AN ORGANIZED HEALTH CARE EDUCATION/TRAINING PROGRAM

## 2024-03-10 RX ORDER — FORMOTEROL FUMARATE DIHYDRATE 20 UG/2ML
20 SOLUTION RESPIRATORY (INHALATION) 2 TIMES DAILY PRN
Status: DISCONTINUED | OUTPATIENT
Start: 2024-03-10 | End: 2024-03-14 | Stop reason: HOSPADM

## 2024-03-10 RX ORDER — IPRATROPIUM BROMIDE 0.5 MG/2.5ML
0.5 SOLUTION RESPIRATORY (INHALATION) EVERY 2 HOUR PRN
Status: DISCONTINUED | OUTPATIENT
Start: 2024-03-10 | End: 2024-03-14 | Stop reason: HOSPADM

## 2024-03-10 RX ORDER — NYSTATIN 100000 [USP'U]/G
1 POWDER TOPICAL 2 TIMES DAILY
Status: DISCONTINUED | OUTPATIENT
Start: 2024-03-10 | End: 2024-03-14 | Stop reason: HOSPADM

## 2024-03-10 RX ORDER — BUDESONIDE 0.5 MG/2ML
0.5 INHALANT ORAL 2 TIMES DAILY PRN
Status: DISCONTINUED | OUTPATIENT
Start: 2024-03-10 | End: 2024-03-14 | Stop reason: HOSPADM

## 2024-03-10 RX ADMIN — AMLODIPINE BESYLATE 2.5 MG: 2.5 TABLET ORAL at 10:43

## 2024-03-10 RX ADMIN — HYDROCORTISONE SODIUM SUCCINATE 50 MG: 100 INJECTION, POWDER, FOR SOLUTION INTRAMUSCULAR; INTRAVENOUS at 00:41

## 2024-03-10 RX ADMIN — MYCOPHENOLATE MOFETIL 1000 MG: 250 CAPSULE ORAL at 20:25

## 2024-03-10 RX ADMIN — Medication 5 MG: at 20:25

## 2024-03-10 RX ADMIN — SODIUM ZIRCONIUM CYCLOSILICATE 10 G: 10 POWDER, FOR SUSPENSION ORAL at 10:43

## 2024-03-10 RX ADMIN — HYDROCORTISONE SODIUM SUCCINATE 50 MG: 100 INJECTION, POWDER, FOR SOLUTION INTRAMUSCULAR; INTRAVENOUS at 18:19

## 2024-03-10 RX ADMIN — THIAMINE HCL TAB 100 MG 100 MG: 100 TAB at 10:43

## 2024-03-10 RX ADMIN — ATORVASTATIN CALCIUM 40 MG: 40 TABLET, FILM COATED ORAL at 20:25

## 2024-03-10 RX ADMIN — NYSTATIN 1 APPLICATION: 100000 POWDER TOPICAL at 20:25

## 2024-03-10 RX ADMIN — HYDROCORTISONE SODIUM SUCCINATE 50 MG: 100 INJECTION, POWDER, FOR SOLUTION INTRAMUSCULAR; INTRAVENOUS at 14:01

## 2024-03-10 RX ADMIN — PANTOPRAZOLE SODIUM 40 MG: 40 TABLET, DELAYED RELEASE ORAL at 10:43

## 2024-03-10 RX ADMIN — APIXABAN 2.5 MG: 2.5 TABLET, FILM COATED ORAL at 10:43

## 2024-03-10 RX ADMIN — MYCOPHENOLATE MOFETIL 1000 MG: 250 CAPSULE ORAL at 10:42

## 2024-03-10 RX ADMIN — APIXABAN 2.5 MG: 2.5 TABLET, FILM COATED ORAL at 20:25

## 2024-03-10 RX ADMIN — LEVETIRACETAM 500 MG: 500 TABLET, FILM COATED ORAL at 05:54

## 2024-03-10 RX ADMIN — FOLIC ACID 1 MG: 1 TABLET ORAL at 10:43

## 2024-03-10 RX ADMIN — HYDROCORTISONE SODIUM SUCCINATE 50 MG: 100 INJECTION, POWDER, FOR SOLUTION INTRAMUSCULAR; INTRAVENOUS at 05:54

## 2024-03-10 RX ADMIN — LEVETIRACETAM 500 MG: 500 TABLET, FILM COATED ORAL at 18:19

## 2024-03-10 ASSESSMENT — ENCOUNTER SYMPTOMS
NUMBNESS: 0
DYSURIA: 0
HEADACHES: 0
ABDOMINAL PAIN: 0
ARTHRALGIAS: 0
HEMATURIA: 0
CHILLS: 0
FEVER: 0
PALPITATIONS: 0
DIARRHEA: 0
VOMITING: 0
BACK PAIN: 0
FLANK PAIN: 0
MYALGIAS: 0
COUGH: 0
CONSTIPATION: 0
CHEST TIGHTNESS: 0
FATIGUE: 0
WHEEZING: 0
LIGHT-HEADEDNESS: 0
FREQUENCY: 0
SHORTNESS OF BREATH: 0
NAUSEA: 0

## 2024-03-10 ASSESSMENT — PAIN SCALES - GENERAL
PAINLEVEL_OUTOF10: 0 - NO PAIN

## 2024-03-10 ASSESSMENT — PAIN - FUNCTIONAL ASSESSMENT
PAIN_FUNCTIONAL_ASSESSMENT: 0-10

## 2024-03-10 NOTE — CARE PLAN
The patient's goals for the shift include      The clinical goals for the shift include stabilize blood sugar    Problem: Diabetes  Goal: Achieve decreasing blood glucose levels by end of shift  Outcome: Progressing  Goal: Increase stability of blood glucose readings by end of shift  Outcome: Progressing  Goal: Decrease in ketones present in urine by end of shift  Outcome: Progressing  Goal: Maintain electrolyte levels within acceptable range throughout shift  Outcome: Progressing  Goal: Maintain glucose levels >70mg/dl to <250mg/dl throughout shift  Outcome: Progressing  Goal: No changes in neurological exam by end of shift  Outcome: Progressing  Goal: Learn about and adhere to nutrition recommendations by end of shift  Outcome: Progressing  Goal: Vital signs within normal range for age by end of shift  Outcome: Progressing  Goal: Increase self care and/or family involovement by end of shift  Outcome: Progressing  Goal: Receive DSME education by end of shift  Outcome: Progressing     Problem: Skin  Goal: Decreased wound size/increased tissue granulation at next dressing change  Outcome: Progressing  Goal: Participates in plan/prevention/treatment measures  Outcome: Progressing  Goal: Prevent/manage excess moisture  Outcome: Progressing  Goal: Prevent/minimize sheer/friction injuries  Outcome: Progressing  Goal: Promote/optimize nutrition  Outcome: Progressing  Goal: Promote skin healing  Outcome: Progressing

## 2024-03-10 NOTE — CARE PLAN
Problem: Skin  Goal: Decreased wound size/increased tissue granulation at next dressing change  Outcome: Progressing  Flowsheets (Taken 3/9/2024 2016)  Decreased wound size/increased tissue granulation at next dressing change:   Promote sleep for wound healing   Protective dressings over bony prominences  Goal: Participates in plan/prevention/treatment measures  Outcome: Progressing  Flowsheets (Taken 3/9/2024 2016)  Participates in plan/prevention/treatment measures: Elevate heels  Goal: Prevent/manage excess moisture  Outcome: Progressing  Flowsheets (Taken 3/9/2024 2016)  Prevent/manage excess moisture: Cleanse incontinence/protect with barrier cream  Goal: Prevent/minimize sheer/friction injuries  Outcome: Progressing  Flowsheets (Taken 3/9/2024 2016)  Prevent/minimize sheer/friction injuries:   HOB 30 degrees or less   Turn/reposition every 2 hours/use positioning/transfer devices  Goal: Promote/optimize nutrition  Outcome: Progressing  Goal: Promote skin healing  Outcome: Progressing  Flowsheets (Taken 3/9/2024 2016)  Promote skin healing: Turn/reposition every 2 hours/use positioning/transfer devices

## 2024-03-10 NOTE — CONSULTS
Endocrinology Initial Inpatient Consult Note     PATIENT NAME: Bing Holliday  MRN: [id number]  DATE: 3/10/2024    CONSULTING PHYSICIAN: Dr. Yolanda Moreno.  REASON FOR CONSULT: AI. T2DM.      History of Presenting Illness     62y F w. 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  CBC Short a macrocytic hyperchromic anemia with hemoglobin of 7.9 g/dL.  Her BMP glucose was 120.  She did have an elevated serum creatinine 1.56.  Alcohol level was negative.  Lactate was 1.8.  INR was 1.  Urine hCG was negative.  Her troponins were 32 then 20 then 18.  Venous blood gas did show a pH of 7.22.  Patient's dsDNA antibody was negative.  Blood culture has shown no growth to date for 1 day.  TSH was found to be 2.93.  Her urinalysis was negative for any leukocyte Estrace, nitrite, or pyuria.  Urine drug screen was negative.  The patient was subsequently admitted for further management.  She was found to have a point-of-care glucose of 50 mg/dL at 1625 on 3/9.  CT of the brain was negative for any acute process.  She also underwent a CT of the cervical spine which showed no acute fracture or subluxation.    In regards to her adrenal insufficiency, the patient reports that she takes 20 mg twice daily.  She does not know who gave that recommendation.  She has been taking this in earnest.  Denies missing any doses.  She also states that when her blood sugar was low yesterday at 50 mg/dL.  She had no symptoms whatsoever.  She states that she did not feel any better after receiving dextrose or her hypoglycemia treatment.      Review of Systems (Bold if Positive)     General: Fevers, Chills, Weight Loss, or Night  "Sweats  HEENT: Headaches or Blurry Vision  Cardiovascular: CP, Palpitations, Diaphoresis, or Peripheral Edema  Respiratory: Cough, Shortness of Breath, or Hemoptysis  Gastrointestinal: N/V, Abdominal Pain, Constipation, Diarrhea, Melena, Hematochezia  Genitourinary: Polyruia, Dysuria, Urgency   Endo: Polydipsia, Heat/Cold Intolerance, Hair/Skin/Nail Changes  Rheum: Joint Pain   MSK: LBP, Muscle Pain  Psych: Anxiety, Depression      Physical Examination     /67 (BP Location: Right arm, Patient Position: Lying)   Pulse 67   Temp 36.1 °C (97 °F) (Temporal)   Resp 16   Ht 1.676 m (5' 6\")   Wt 96.3 kg (212 lb 6.4 oz)   LMP  (LMP Unknown)   SpO2 97%   BMI 34.28 kg/m²   General: No acute distress. A&Ox3.  Temporal wasting.  HEENT: PERRLA. EOMi. Ø Exophthalmos   Neck: No LAD.  Thyroid: 20g Ø Bruit  CV: Normal rate and rhythm. No murmurs or rubs auscultated.   Resp: CTA b/l. No wheezing or crackles.   Abdomen: Generalized abdominal pain.  No rebound tenderness.  Extremities: Trace pedal edema b/l.  Neuro: CN II-XII Grossly Intact. Ø Tremor. Brisk Reflexes.   Psych: Appropriate affect  Skin: No apparent rashes      Past Medical / Surgical / Family / Social History     Past Medical History:   Diagnosis Date    Acute upper respiratory infection, unspecified 03/04/2020    Acute URI    Acute upper respiratory infection, unspecified 09/30/2015    URTI (acute upper respiratory infection)    Arthritis     Body mass index (BMI) 23.0-23.9, adult 10/15/2021    BMI 23.0-23.9, adult    Body mass index (BMI) 33.0-33.9, adult 03/04/2020    BMI 33.0-33.9,adult    Cardiomegaly 08/27/2013    Left ventricular hypertrophy    Chronic kidney disease, stage 3 unspecified (CMS/HCC) 07/02/2013    Chronic kidney disease, stage III (moderate)    Disease of pericardium, unspecified 07/02/2013    Pericardial disease    Encounter for follow-up examination after completed treatment for conditions other than malignant neoplasm 10/06/2022    " Hospital discharge follow-up    Generalized contraction of visual field, right eye 01/29/2015    Generalized contraction of visual field of right eye    Homonymous bilateral field defects, right side 04/29/2016    Homonymous bilateral field defects of right side    Hypertensive chronic kidney disease with stage 1 through stage 4 chronic kidney disease, or unspecified chronic kidney disease 07/02/2013    Nephrosclerosis    Laceration without foreign body, left foot, initial encounter 07/03/2018    Foot laceration, left, initial encounter    Migraine with aura, not intractable, without status migrainosus 10/24/2022    Ocular migraine    Other conditions influencing health status 07/02/2013    Chronic Glomerulonephritis In Diseases Classified Elsewhere    Other conditions influencing health status 07/02/2013    Progressive Familial Myoclonic Epilepsy    Other conditions influencing health status 07/02/2013    Protein S Deficiency    Other conditions influencing health status 05/22/2015    Familial Combined Hyperlipidemia    Other conditions influencing health status 10/24/2022    IDDM (insulin dependent diabetes mellitus)    Other conditions influencing health status 03/14/2022    Diabetes mellitus, insulin dependent (IDDM), uncontrolled    Other long term (current) drug therapy 10/24/2022    Long-term use of Plaquenil    Personal history of diseases of the blood and blood-forming organs and certain disorders involving the immune mechanism 07/02/2013    History of thrombocytopenia    Personal history of diseases of the skin and subcutaneous tissue 08/11/2015    History of foot ulcer    Personal history of nephrotic syndrome 07/02/2013    History of nephrotic syndrome    Personal history of other diseases of the circulatory system 08/27/2013    History of sinus tachycardia    Personal history of other diseases of the nervous system and sense organs     History of cataract    Personal history of other diseases of the  respiratory system     History of bronchitis    Personal history of other infectious and parasitic diseases 07/02/2013    History of hepatitis    Personal history of other specified conditions     History of shortness of breath    Personal history of other specified conditions 08/27/2013    History of edema    Puckering of macula, right eye 10/24/2022    ERM OD (epiretinal membrane, right eye)    Raynaud's syndrome without gangrene 07/02/2013    Raynaud's disease    Systemic lupus erythematosus, unspecified (CMS/HCC) 07/24/2015    SLE (systemic lupus erythematosus)    Systemic lupus erythematosus, unspecified (CMS/HCC) 07/24/2015    SLE (systemic lupus erythematosus)    Systemic lupus erythematosus, unspecified (CMS/HCC) 07/24/2015    Systemic lupus    Type 2 diabetes mellitus with diabetic nephropathy (CMS/HCC) 07/02/2013    Type 2 diabetes with nephropathy    Type 2 diabetes mellitus with mild nonproliferative diabetic retinopathy without macular edema, left eye (CMS/HCC) 07/27/2015    Non-proliferative diabetic retinopathy, left eye    Type 2 diabetes mellitus with mild nonproliferative diabetic retinopathy without macular edema, unspecified eye (CMS/HCC) 07/24/2015    Mild non proliferative diabetic retinopathy    Type 2 diabetes mellitus with proliferative diabetic retinopathy without macular edema, right eye (CMS/HCC) 07/27/2015    Proliferative diabetic retinopathy of right eye    Type 2 diabetes mellitus with proliferative diabetic retinopathy without macular edema, unspecified eye (CMS/HCC) 07/24/2015    Diabetic proliferative retinopathy    Unspecified acute and subacute iridocyclitis 07/24/2015    Acute iritis, right eye    Unspecified open wound, left foot, sequela 07/03/2018    Wound, open, foot, left, sequela     Past Surgical History:   Procedure Laterality Date    ANKLE SURGERY  01/29/2015    Ankle Surgery    CHOLECYSTECTOMY  01/29/2015    Cholecystectomy    CT GUIDED PERCUTANEOUS BIOPSY BONE DEEP   5/4/2021    CT GUIDED PERCUTANEOUS BIOPSY BONE DEEP 5/4/2021 [facility] CLINICAL LEGAC[facility]    EYE SURGERY  03/06/2015    Eye Surgery    FOOT SURGERY  01/29/2015    Foot Surgery    MR HEAD ANGIO WO IV CONTRAST  7/26/2013    MR HEAD ANGIO WO IV CONTRAST 7/26/2013 [facility] CLINICAL LEGACY    MR HEAD ANGIO WO IV CONTRAST  9/17/2021    MR HEAD ANGIO WO IV CONTRAST 9/17/2021 [facility]    MR HEAD ANGIO WO IV CONTRAST  3/25/2023    MR HEAD ANGIO WO IV CONTRAST [facility]    MR NECK ANGIO WO IV CONTRAST  7/26/2013    MR NECK ANGIO WO IV CONTRAST 7/26/2013 [facility] CLINICAL LEGACY    MR NECK ANGIO WO IV CONTRAST  9/17/2021    MR NECK ANGIO WO IV CONTRAST 9/17/2021 [facility]    MR NECK ANGIO WO IV CONTRAST  3/25/2023    MR NECK ANGIO WO IV CONTRAST [facility]    OTHER SURGICAL HISTORY  01/29/2015    Creation Of Pericardial Window    OTHER SURGICAL HISTORY  01/29/2015    Quadricepsplasty    TOTAL HIP ARTHROPLASTY  01/29/2015    Hip Replacement     Family History   Problem Relation Name Age of Onset    Other (RENAL DISEASE) Mother          END STAGE    Other (CARDIAC DISORDER) Mother      Cataracts Mother      Stroke Mother      Diabetes Mother      Kidney disease Mother      Lupus Mother      Other (CARDIAC DISORDER) Father      COPD Father      Glaucoma Father      Hypertension Father      Sleep apnea Father      Other (CARDIAC DISORDER) Sister      Depression Sister      Kidney disease Sister      Sickle cell trait Sister      Sleep apnea Daughter       Social History     Socioeconomic History    Marital status:      Spouse name: Not on file    Number of children: Not on file    Years of education: Not on file    Highest education level: Not on file   Occupational History    Not on file   Tobacco Use    Smoking status: Never     Passive exposure: Never    Smokeless tobacco: Never   Vaping Use    Vaping Use: Never used   Substance and Sexual Activity    Alcohol use: Not Currently     Comment: RARE    Drug use: Never     Sexual activity: Defer   Other Topics Concern    Not on file   Social History Narrative    Not on file     Social Determinants of Health     Financial Resource Strain: Low Risk  (3/9/2024)    Overall Financial Resource Strain (CARDIA)     Difficulty of Paying Living Expenses: Not hard at all   Food Insecurity: Patient Unable To Answer (3/9/2024)    Hunger Vital Sign     Worried About Running Out of Food in the Last Year: Patient unable to answer     Ran Out of Food in the Last Year: Patient unable to answer   Transportation Needs: No Transportation Needs (3/9/2024)    PRAPARE - Transportation     Lack of Transportation (Medical): No     Lack of Transportation (Non-Medical): No   Physical Activity: Patient Declined (1/15/2024)    Exercise Vital Sign     Days of Exercise per Week: Patient declined     Minutes of Exercise per Session: Patient declined   Stress: No Stress Concern Present (1/15/2024)    [employer] [place] [employer] - Occupational Stress Questionnaire     Feeling of Stress : Not at all   Social Connections: Unknown (1/15/2024)    Social Connection and Isolation Panel [NHANES]     Frequency of Communication with Friends and Family: More than three times a week     Frequency of Social Gatherings with Friends and Family: More than three times a week     Attends Buddhist Services: Patient declined     Active Member of Clubs or Organizations: Patient declined     Attends Club or Organization Meetings: Patient declined     Marital Status: Patient unable to answer   Intimate Partner Violence: Not At Risk (1/15/2024)    Humiliation, Afraid, Rape, and Kick questionnaire     Fear of Current or Ex-Partner: No     Emotionally Abused: No     Physically Abused: No     Sexually Abused: No   Housing Stability: Low Risk  (3/9/2024)    Housing Stability Vital Sign     Unable to Pay for Housing in the Last Year: No     Number of Places Lived in the Last Year: 1     Unstable Housing in the Last Year: No        "Medications & Allergies     MAR and PTA Meds Reviewed     Allergies   Allergen Reactions    Ace Inhibitors Swelling, Angioedema, Shortness of breath and Other     'FACIAL TWISTING\" \"LOOKS LIKE I HAD A STROKE IN MY SLEEP\"    Hydroxychloroquine Other, Nausea And Vomiting, Nausea/vomiting and Unknown     RETINAL BLEEDING    RETINAL BLEEDING      RETINAL BLEEDING, Eye problems    Lisinopril Swelling    Penicillins Unknown     tolerates cephalosporins-unknown childhood allergy    Sulfa (Sulfonamide Antibiotics) Hives       Data     Recent Labs and Imaging Reviewed      Assessment / Plan       # Hypoglycemia  I have seen the patient in the past for the same issue.  Once again, I do think that we have to fulfill all 3 tendons of Whipple's triad before calling this hypoglycemia.  The patient did not have any symptoms whatsoever when her sugars were 50 mg/dL.  The patient has SLE which is associated with multiple vasculitides.  I do not know if we can necessarily trust her point-of-care blood sugar testing.  In the past I have recommended that we not treat her hypoglycemia and obtain a BMP to make sure that it is in fact true hypoglycemia.  If in fact it is true hypoglycemia then we should obtain a C-peptide, beta hydroxybutyrate, and sulfonylurea screen.  I have discussed this with the RN at the bedside.  I have also discussed this with the admitting resident.    # Uncontrolled Type 2 Diabetes Mellitus  Home Regimen: None.  Hemoglobin A1c (7/23): 6.9%  Nutrtion: Regular Diet.    Given that the patient presented with hypoglycemia, hold all therapies.  Continue to check her blood sugars with meals and at bedtime.  Can check as needed when she does have symptoms attributable to hypoglycemia.  No indication for any therapies as an outpatient either.    # Adrenal Insufficiency  I have remarked on this problem on multiple occasions in the past when I have seen this patient.  I did start her on prednisone due to her secondary " adrenal insufficiency.  She is not taking supraphysiologic doses of glucocorticoids, namely prednisone 20 mg twice daily.  I understand this is being given for hypoglycemia and hypothermia.  I would expect that if this was truly adrenal mediated, she would respond to physiologic doses (i.e. greater than 7.5 mg).  We really should do our best to mitigate the use of glucocorticoid in this patient who has osteoporosis.  I reviewed the endocrinology note written on 1/25 at [facility].  They recommended that she be discharged on prednisone 15 mg daily.  I think this is a reasonable dose.    # Osteoporosis  In the setting of chronic glucocorticoid use.  This will be further managed as an outpatient.  She probably would benefit from a bisphosphonate, these agents are best studied with steroid-induced adynamic bone disease.       Avi Sloan, DO  Endocrinology, Diabetes, and Metabolism    Available via [employer] Messenger    Please excuse and typographical or unwanted errors within this documentation as voice recognition software was used to dictate this note.

## 2024-03-10 NOTE — NURSING NOTE
Dr. Woo notified of 14 beat run ov [facility] this afternoon, no new orders received.

## 2024-03-10 NOTE — PROGRESS NOTES
Subjective   No acute events overnight.    This morning she endorses no new complaints or concerns just feels a bit cold for which blankets were provided    Objective   Last Recorded Vitals  /67 (BP Location: Right arm, Patient Position: Lying)   Pulse 67   Temp 36.1 °C (97 °F) (Temporal)   Resp 16   Wt 96.3 kg (212 lb 6.4 oz)   SpO2 97%     Review of Systems   Constitutional:  Negative for chills, fatigue and fever.   Respiratory:  Negative for cough, chest tightness, shortness of breath and wheezing.    Cardiovascular:  Negative for chest pain, palpitations and leg swelling.   Gastrointestinal:  Negative for abdominal pain, constipation, diarrhea, nausea and vomiting.   Genitourinary:  Negative for dysuria, flank pain, frequency, hematuria and urgency.   Musculoskeletal:  Negative for arthralgias, back pain and myalgias.   Neurological:  Negative for light-headedness, numbness and headaches.       Physical Exam  Constitutional:       Appearance: Normal appearance.   HENT:      Head: Normocephalic.   Cardiovascular:      Rate and Rhythm: Normal rate and regular rhythm.      Heart sounds: Murmur heard.   Pulmonary:      Effort: No respiratory distress.      Breath sounds: Normal breath sounds. No wheezing, rhonchi or rales.   Abdominal:      Palpations: Abdomen is soft.      Tenderness: There is no abdominal tenderness. There is no guarding.   Musculoskeletal:         General: No swelling or tenderness.      Right lower leg: Edema present.      Left lower leg: Edema present.   Skin:     General: Skin is dry.      Capillary Refill: Capillary refill takes less than 2 seconds.   Neurological:      General: No focal deficit present.      Mental Status: She is alert and oriented to person, place, and time.   Psychiatric:         Mood and Affect: Mood normal.         Relevant Results  Results for orders placed or performed during the hospital encounter of 03/09/24 (from the past 24 hour(s))   POCT GLUCOSE    Result Value Ref Range    POCT Glucose 92 74 - 99 mg/dL   POCT GLUCOSE   Result Value Ref Range    POCT Glucose 50 (L) 74 - 99 mg/dL   POCT GLUCOSE   Result Value Ref Range    POCT Glucose 162 (H) 74 - 99 mg/dL   POCT GLUCOSE   Result Value Ref Range    POCT Glucose 63 (L) 74 - 99 mg/dL   POCT GLUCOSE   Result Value Ref Range    POCT Glucose 165 (H) 74 - 99 mg/dL   Lavender Top   Result Value Ref Range    Extra Tube Hold for add-ons.    CBC   Result Value Ref Range    WBC 6.1 4.4 - 11.3 x10*3/uL    nRBC 0.7 (H) 0.0 - 0.0 /100 WBCs    RBC 2.48 (L) 4.00 - 5.20 x10*6/uL    Hemoglobin 7.3 (L) 12.0 - 16.0 g/dL    Hematocrit 25.7 (L) 36.0 - 46.0 %     (H) 80 - 100 fL    MCH 29.4 26.0 - 34.0 pg    MCHC 28.4 (L) 32.0 - 36.0 g/dL    RDW 21.7 (H) 11.5 - 14.5 %    Platelets 73 (L) 150 - 450 x10*3/uL   Comprehensive metabolic panel   Result Value Ref Range    Glucose 91 74 - 99 mg/dL    Sodium 144 136 - 145 mmol/L    Potassium 5.2 3.5 - 5.3 mmol/L    Chloride 113 (H) 98 - 107 mmol/L    Bicarbonate 22 21 - 32 mmol/L    Anion Gap 14 10 - 20 mmol/L    Urea Nitrogen 34 (H) 6 - 23 mg/dL    Creatinine 1.68 (H) 0.50 - 1.05 mg/dL    eGFR 34 (L) >60 mL/min/1.73m*2    Calcium 8.5 (L) 8.6 - 10.3 mg/dL    Albumin 3.0 (L) 3.4 - 5.0 g/dL    Alkaline Phosphatase 98 33 - 136 U/L    Total Protein 5.5 (L) 6.4 - 8.2 g/dL    AST 25 9 - 39 U/L    Bilirubin, Total 0.4 0.0 - 1.2 mg/dL    ALT 23 7 - 45 U/L   POCT GLUCOSE   Result Value Ref Range    POCT Glucose 90 74 - 99 mg/dL   POCT GLUCOSE   Result Value Ref Range    POCT Glucose 94 74 - 99 mg/dL   Blood Gas Arterial Full Panel   Result Value Ref Range    POCT pH, Arterial 7.45 (H) 7.38 - 7.42 pH    POCT pCO2, Arterial 36 (L) 38 - 42 mm Hg    POCT pO2, Arterial 71 (L) 85 - 95 mm Hg    POCT SO2, Arterial 95 94 - 100 %    POCT Oxy Hemoglobin, Arterial 94.3 94.0 - 98.0 %    POCT Hematocrit Calculated, Arterial 23.0 (L) 36.0 - 46.0 %    POCT Sodium, Arterial 143 136 - 145 mmol/L    POCT  Potassium, Arterial 4.9 3.5 - 5.3 mmol/L    POCT Chloride, Arterial 115 (H) 98 - 107 mmol/L    POCT Ionized Calcium, Arterial 1.28 1.10 - 1.33 mmol/L    POCT Glucose, Arterial 92 74 - 99 mg/dL    POCT Lactate, Arterial 0.5 0.4 - 2.0 mmol/L    POCT Base Excess, Arterial 1.0 -2.0 - 3.0 mmol/L    POCT HCO3 Calculated, Arterial 25.0 22.0 - 26.0 mmol/L    POCT Hemoglobin, Arterial 7.8 (L) 12.0 - 16.0 g/dL    POCT Anion Gap, Arterial 8 (L) 10 - 25 mmo/L    Patient Temperature      FiO2 21 %       Scheduled medications  amLODIPine, 2.5 mg, oral, Daily  apixaban, 2.5 mg, oral, BID  atorvastatin, 40 mg, oral, Daily  folic acid, 1 mg, oral, Daily  hydrocortisone sodium succinate, 50 mg, intravenous, q6h  insulin lispro, 0-10 Units, subcutaneous, TID with meals  levETIRAcetam, 500 mg, oral, q12h  melatonin, 5 mg, oral, Nightly  mycophenolate, 1,000 mg, oral, BID  pantoprazole, 40 mg, oral, Daily  sodium zirconium cyclosilicate, 10 g, oral, Daily  thiamine, 100 mg, oral, Daily        Continuous medications       PRN medications  PRN medications: budesonide, dextrose 10 % in water (D10W), dextrose, formoterol, glucagon, ipratropium, ipratropium-albuteroL    Assessment/Plan     Adrenal insufficiency   - Hydrocortisone 50 mg every 6 hours  - Endocrine consult  - Follow-up blood cultures and urine cultures (3/9)  - Follow-up respiratory viral panel + COVID swab     IDDM 2  History of hypoglycemia  - Hypoglycemia protocol  - Insulin sliding scale     Atrial fibrillation on Eliquis  - Continue home Eliquis 2.5 mg twice daily    Lower extremity swelling with exertional dyspnea   - Follow-up echocardiogram    History of SLE cerebritis  Andrew arthropathy  - Continue home mycophenolate Moffa 200 mg twice daily  - Follow-up anti-double-stranded DNA     History of seizures  - Continue home Keppra 500 mg twice daily     COPD not in exacerbation  - DuoNebs every 6 hours  - DuoNebs every 4 hours as needed for shortness of breath    Consults:  endocrine   DVT Ppx: eliquis   Diet: diabetic   CODE STATUS: FULL      Disposition: Admitted as adrenal insufficiency requiring hydrocortisone and pending endocrine consult recommendations    Treasure Woo MD   PGY 3 [facility]

## 2024-03-11 ENCOUNTER — APPOINTMENT (OUTPATIENT)
Dept: CARDIOLOGY | Facility: HOSPITAL | Age: 64
DRG: 643 | End: 2024-03-11
Payer: MEDICARE

## 2024-03-11 ENCOUNTER — APPOINTMENT (OUTPATIENT)
Dept: RHEUMATOLOGY | Facility: CLINIC | Age: 64
End: 2024-03-11
Payer: MEDICARE

## 2024-03-11 LAB
ABO GROUP (TYPE) IN BLOOD: NORMAL
ALBUMIN SERPL BCP-MCNC: 2.9 G/DL (ref 3.4–5)
ALP SERPL-CCNC: 88 U/L (ref 33–136)
ALT SERPL W P-5'-P-CCNC: 20 U/L (ref 7–45)
ANION GAP SERPL CALC-SCNC: 11 MMOL/L (ref 10–20)
ANTIBODY SCREEN: NORMAL
AORTIC VALVE PEAK VELOCITY: 1.39 M/S
AST SERPL W P-5'-P-CCNC: 20 U/L (ref 9–39)
AV PEAK GRADIENT: 7.7 MMHG
AVA (PEAK VEL): 1.92 CM2
BACTERIA UR CULT: NO GROWTH
BILIRUB SERPL-MCNC: 0.4 MG/DL (ref 0–1.2)
BLOOD EXPIRATION DATE: NORMAL
BUN SERPL-MCNC: 32 MG/DL (ref 6–23)
CALCIUM SERPL-MCNC: 8.3 MG/DL (ref 8.6–10.3)
CHLORIDE SERPL-SCNC: 113 MMOL/L (ref 98–107)
CO2 SERPL-SCNC: 23 MMOL/L (ref 21–32)
CREAT SERPL-MCNC: 1.89 MG/DL (ref 0.5–1.05)
DISPENSE STATUS: NORMAL
EGFRCR SERPLBLD CKD-EPI 2021: 30 ML/MIN/1.73M*2
EJECTION FRACTION APICAL 4 CHAMBER: 42.2
EJECTION FRACTION: 43 %
ERYTHROCYTE [DISTWIDTH] IN BLOOD BY AUTOMATED COUNT: 21.6 % (ref 11.5–14.5)
GLUCOSE BLD MANUAL STRIP-MCNC: 117 MG/DL (ref 74–99)
GLUCOSE BLD MANUAL STRIP-MCNC: 197 MG/DL (ref 74–99)
GLUCOSE BLD MANUAL STRIP-MCNC: 204 MG/DL (ref 74–99)
GLUCOSE BLD MANUAL STRIP-MCNC: 219 MG/DL (ref 74–99)
GLUCOSE SERPL-MCNC: 198 MG/DL (ref 74–99)
HCT VFR BLD AUTO: 23.9 % (ref 36–46)
HCT VFR BLD AUTO: 31.7 % (ref 36–46)
HGB BLD-MCNC: 6.8 G/DL (ref 12–16)
HGB BLD-MCNC: 8.9 G/DL (ref 12–16)
LEFT VENTRICLE INTERNAL DIMENSION DIASTOLE: 4.9 CM (ref 3.5–6)
LEFT VENTRICULAR OUTFLOW TRACT DIAMETER: 2.1 CM
MCH RBC QN AUTO: 29.6 PG (ref 26–34)
MCHC RBC AUTO-ENTMCNC: 28.5 G/DL (ref 32–36)
MCV RBC AUTO: 104 FL (ref 80–100)
NRBC BLD-RTO: 0.6 /100 WBCS (ref 0–0)
PLATELET # BLD AUTO: 73 X10*3/UL (ref 150–450)
POTASSIUM SERPL-SCNC: 4.3 MMOL/L (ref 3.5–5.3)
PRODUCT BLOOD TYPE: 5100
PRODUCT CODE: NORMAL
PROT SERPL-MCNC: 5.4 G/DL (ref 6.4–8.2)
RBC # BLD AUTO: 2.3 X10*6/UL (ref 4–5.2)
RH FACTOR (ANTIGEN D): NORMAL
RIGHT VENTRICLE FREE WALL PEAK S': 7.05 CM/S
RIGHT VENTRICLE PEAK SYSTOLIC PRESSURE: 49.5 MMHG
SODIUM SERPL-SCNC: 143 MMOL/L (ref 136–145)
TRICUSPID ANNULAR PLANE SYSTOLIC EXCURSION: 1.5 CM
UNIT ABO: NORMAL
UNIT NUMBER: NORMAL
UNIT RH: NORMAL
UNIT VOLUME: 350
WBC # BLD AUTO: 5.2 X10*3/UL (ref 4.4–11.3)
XM INTEP: NORMAL

## 2024-03-11 PROCEDURE — 1200000002 HC GENERAL ROOM WITH TELEMETRY DAILY

## 2024-03-11 PROCEDURE — 85027 COMPLETE CBC AUTOMATED: CPT | Performed by: STUDENT IN AN ORGANIZED HEALTH CARE EDUCATION/TRAINING PROGRAM

## 2024-03-11 PROCEDURE — 36415 COLL VENOUS BLD VENIPUNCTURE: CPT | Performed by: STUDENT IN AN ORGANIZED HEALTH CARE EDUCATION/TRAINING PROGRAM

## 2024-03-11 PROCEDURE — 82947 ASSAY GLUCOSE BLOOD QUANT: CPT

## 2024-03-11 PROCEDURE — 36430 TRANSFUSION BLD/BLD COMPNT: CPT

## 2024-03-11 PROCEDURE — 85014 HEMATOCRIT: CPT

## 2024-03-11 PROCEDURE — 2500000001 HC RX 250 WO HCPCS SELF ADMINISTERED DRUGS (ALT 637 FOR MEDICARE OP): Performed by: STUDENT IN AN ORGANIZED HEALTH CARE EDUCATION/TRAINING PROGRAM

## 2024-03-11 PROCEDURE — 2500000004 HC RX 250 GENERAL PHARMACY W/ HCPCS (ALT 636 FOR OP/ED)

## 2024-03-11 PROCEDURE — 36415 COLL VENOUS BLD VENIPUNCTURE: CPT

## 2024-03-11 PROCEDURE — 80053 COMPREHEN METABOLIC PANEL: CPT | Performed by: STUDENT IN AN ORGANIZED HEALTH CARE EDUCATION/TRAINING PROGRAM

## 2024-03-11 PROCEDURE — 86920 COMPATIBILITY TEST SPIN: CPT

## 2024-03-11 PROCEDURE — 86901 BLOOD TYPING SEROLOGIC RH(D): CPT

## 2024-03-11 PROCEDURE — P9016 RBC LEUKOCYTES REDUCED: HCPCS

## 2024-03-11 PROCEDURE — 2500000002 HC RX 250 W HCPCS SELF ADMINISTERED DRUGS (ALT 637 FOR MEDICARE OP, ALT 636 FOR OP/ED): Performed by: STUDENT IN AN ORGANIZED HEALTH CARE EDUCATION/TRAINING PROGRAM

## 2024-03-11 PROCEDURE — 93306 TTE W/DOPPLER COMPLETE: CPT

## 2024-03-11 PROCEDURE — 2500000004 HC RX 250 GENERAL PHARMACY W/ HCPCS (ALT 636 FOR OP/ED): Performed by: STUDENT IN AN ORGANIZED HEALTH CARE EDUCATION/TRAINING PROGRAM

## 2024-03-11 PROCEDURE — 99233 SBSQ HOSP IP/OBS HIGH 50: CPT

## 2024-03-11 RX ORDER — PREDNISONE 20 MG/1
20 TABLET ORAL DAILY
Status: DISCONTINUED | OUTPATIENT
Start: 2024-03-12 | End: 2024-03-13

## 2024-03-11 RX ORDER — FUROSEMIDE 10 MG/ML
20 INJECTION INTRAMUSCULAR; INTRAVENOUS ONCE
Status: COMPLETED | OUTPATIENT
Start: 2024-03-11 | End: 2024-03-11

## 2024-03-11 RX ADMIN — HYDROCORTISONE SODIUM SUCCINATE 50 MG: 100 INJECTION, POWDER, FOR SOLUTION INTRAMUSCULAR; INTRAVENOUS at 00:34

## 2024-03-11 RX ADMIN — Medication 5 MG: at 20:33

## 2024-03-11 RX ADMIN — FUROSEMIDE 20 MG: 10 INJECTION, SOLUTION INTRAMUSCULAR; INTRAVENOUS at 15:55

## 2024-03-11 RX ADMIN — LEVETIRACETAM 500 MG: 500 TABLET, FILM COATED ORAL at 18:25

## 2024-03-11 RX ADMIN — INSULIN LISPRO 2 UNITS: 100 INJECTION, SOLUTION INTRAVENOUS; SUBCUTANEOUS at 09:04

## 2024-03-11 RX ADMIN — MYCOPHENOLATE MOFETIL 1000 MG: 250 CAPSULE ORAL at 09:03

## 2024-03-11 RX ADMIN — FOLIC ACID 1 MG: 1 TABLET ORAL at 09:04

## 2024-03-11 RX ADMIN — APIXABAN 2.5 MG: 2.5 TABLET, FILM COATED ORAL at 20:33

## 2024-03-11 RX ADMIN — NYSTATIN 1 APPLICATION: 100000 POWDER TOPICAL at 09:03

## 2024-03-11 RX ADMIN — HYDROCORTISONE SODIUM SUCCINATE 50 MG: 100 INJECTION, POWDER, FOR SOLUTION INTRAMUSCULAR; INTRAVENOUS at 05:36

## 2024-03-11 RX ADMIN — NYSTATIN 1 APPLICATION: 100000 POWDER TOPICAL at 20:33

## 2024-03-11 RX ADMIN — ATORVASTATIN CALCIUM 40 MG: 40 TABLET, FILM COATED ORAL at 20:33

## 2024-03-11 RX ADMIN — MYCOPHENOLATE MOFETIL 1000 MG: 250 CAPSULE ORAL at 20:33

## 2024-03-11 RX ADMIN — SODIUM ZIRCONIUM CYCLOSILICATE 10 G: 10 POWDER, FOR SUSPENSION ORAL at 09:04

## 2024-03-11 RX ADMIN — LEVETIRACETAM 500 MG: 500 TABLET, FILM COATED ORAL at 05:35

## 2024-03-11 RX ADMIN — AMLODIPINE BESYLATE 2.5 MG: 2.5 TABLET ORAL at 09:04

## 2024-03-11 RX ADMIN — INSULIN LISPRO 4 UNITS: 100 INJECTION, SOLUTION INTRAVENOUS; SUBCUTANEOUS at 12:55

## 2024-03-11 RX ADMIN — THIAMINE HCL TAB 100 MG 100 MG: 100 TAB at 09:04

## 2024-03-11 RX ADMIN — APIXABAN 2.5 MG: 2.5 TABLET, FILM COATED ORAL at 09:04

## 2024-03-11 RX ADMIN — PANTOPRAZOLE SODIUM 40 MG: 40 TABLET, DELAYED RELEASE ORAL at 09:04

## 2024-03-11 ASSESSMENT — COGNITIVE AND FUNCTIONAL STATUS - GENERAL
MOVING TO AND FROM BED TO CHAIR: A LOT
DRESSING REGULAR UPPER BODY CLOTHING: A LOT
TURNING FROM BACK TO SIDE WHILE IN FLAT BAD: A LOT
PERSONAL GROOMING: A LITTLE
WALKING IN HOSPITAL ROOM: A LOT
TOILETING: A LOT
MOVING FROM LYING ON BACK TO SITTING ON SIDE OF FLAT BED WITH BEDRAILS: A LOT
CLIMB 3 TO 5 STEPS WITH RAILING: TOTAL
DRESSING REGULAR LOWER BODY CLOTHING: A LOT
EATING MEALS: A LITTLE
DRESSING REGULAR LOWER BODY CLOTHING: A LOT
STANDING UP FROM CHAIR USING ARMS: A LOT
CLIMB 3 TO 5 STEPS WITH RAILING: TOTAL
MOVING TO AND FROM BED TO CHAIR: A LOT
HELP NEEDED FOR BATHING: A LOT
DAILY ACTIVITIY SCORE: 14
MOVING FROM LYING ON BACK TO SITTING ON SIDE OF FLAT BED WITH BEDRAILS: A LOT
STANDING UP FROM CHAIR USING ARMS: A LOT
TURNING FROM BACK TO SIDE WHILE IN FLAT BAD: A LOT
HELP NEEDED FOR BATHING: A LOT
EATING MEALS: A LITTLE
PERSONAL GROOMING: A LITTLE
DRESSING REGULAR UPPER BODY CLOTHING: A LOT
MOBILITY SCORE: 11
TOILETING: A LOT
DAILY ACTIVITIY SCORE: 14
MOBILITY SCORE: 11
WALKING IN HOSPITAL ROOM: A LOT

## 2024-03-11 ASSESSMENT — PAIN SCALES - GENERAL
PAINLEVEL_OUTOF10: 0 - NO PAIN

## 2024-03-11 ASSESSMENT — PAIN - FUNCTIONAL ASSESSMENT
PAIN_FUNCTIONAL_ASSESSMENT: 0-10

## 2024-03-11 NOTE — CARE PLAN
Problem: Skin  Goal: Decreased wound size/increased tissue granulation at next dressing change  Outcome: Progressing  Flowsheets (Taken 3/10/2024 2022)  Decreased wound size/increased tissue granulation at next dressing change:   Protective dressings over bony prominences   Promote sleep for wound healing  Goal: Participates in plan/prevention/treatment measures  Outcome: Progressing  Flowsheets (Taken 3/10/2024 2022)  Participates in plan/prevention/treatment measures: Elevate heels  Goal: Prevent/manage excess moisture  Outcome: Progressing  Flowsheets (Taken 3/10/2024 2022)  Prevent/manage excess moisture: Cleanse incontinence/protect with barrier cream  Goal: Prevent/minimize sheer/friction injuries  Outcome: Progressing  Flowsheets (Taken 3/10/2024 2022)  Prevent/minimize sheer/friction injuries:   HOB 30 degrees or less   Turn/reposition every 2 hours/use positioning/transfer devices  Goal: Promote/optimize nutrition  Outcome: Progressing  Goal: Promote skin healing  Outcome: Progressing  Flowsheets (Taken 3/10/2024 2022)  Promote skin healing:   Turn/reposition every 2 hours/use positioning/transfer devices   Protective dressings over bony prominences

## 2024-03-11 NOTE — NURSING NOTE
1300- Notified Dr Win about hemoglobin.    1330- No acute changes throughout the shift. No complaints of pain or shortness of breath. Patient being transferred to [address] [place] [address]. Report called to Belinda BURR. Safety maintained and call light within reach.

## 2024-03-11 NOTE — NURSING NOTE
End of shift note, no acute changes this shift. Patient tolerated blood transfusion well. Plan for re draw later tonight to check hgb. Patient vitals maintained. Temp 36.0 . Patient tolerating diet well.

## 2024-03-11 NOTE — PROGRESS NOTES
"Bing Holliday is a 63 y.o. female on day 2 of admission presenting with Hypothermia due to non-environmental cause.    Subjective   No acute events overnight.    Patient seen and examined this morning.  She is ANO x 4.  Overall feels well.  She has no complaints at this time.  Endorses no fevers, chills, confusion.  Admits that she does not feel symptoms when she is hypoglycemic, has remained euglycemic/hyperglycemic overnight and into the morning.  She is eating her meals complains of no symptoms.  Administering late morning rounds that of recent, she had been administering her own medication.  States she had a conversation with her son last night and they agreed that son would be helping administer medications moving forward.    Objective     Physical Exam  Constitutional:       Appearance: Normal appearance.  ANO x 4  HENT:      Head: Normocephalic.   Cardiovascular:      Rate and Rhythm: Normal rate and regular rhythm.      Heart sounds: Murmur heard.   Pulmonary:      Effort: No respiratory distress.      Breath sounds: Normal breath sounds. No wheezing, rhonchi or rales.   Abdominal:      Palpations: Abdomen is soft.      Tenderness: There is no abdominal tenderness. There is no guarding.   Musculoskeletal:         General: No swelling or tenderness.      Right lower le+ pitting edema present.      Left lower le+ pitting edema present.   Skin:     General: Skin is dry.      Capillary Refill: Capillary refill takes less than 2 seconds.   Neurological:      General: No focal deficit present.      Mental Status: She is alert and oriented to person, place, and time.   Psychiatric:         Mood and Affect: Mood normal.   Last Recorded Vitals  Blood pressure 144/65, pulse 70, temperature 36 °C (96.8 °F), temperature source Temporal, resp. rate 16, height 1.676 m (5' 6\"), weight 96.5 kg (212 lb 11.2 oz), SpO2 100 %.  Intake/Output last 3 Shifts:  I/O last 3 completed shifts:  In: 360 (3.7 mL/kg) " [P.O.:360]  Out: 1200 (12.4 mL/kg) [Urine:1200 (0.3 mL/kg/hr)]  Weight: 96.5 kg     Relevant Results  Scheduled medications  amLODIPine, 2.5 mg, oral, Daily  apixaban, 2.5 mg, oral, BID  atorvastatin, 40 mg, oral, Daily  folic acid, 1 mg, oral, Daily  insulin lispro, 0-10 Units, subcutaneous, TID with meals  levETIRAcetam, 500 mg, oral, q12h  melatonin, 5 mg, oral, Nightly  mycophenolate, 1,000 mg, oral, BID  nystatin, 1 Application, Topical, BID  pantoprazole, 40 mg, oral, Daily  perflutren lipid microspheres, 0.5-10 mL of dilution, intravenous, Once in imaging  perflutren protein A microsphere, 0.5 mL, intravenous, Once in imaging  [START ON 3/12/2024] predniSONE, 20 mg, oral, Daily  sodium zirconium cyclosilicate, 10 g, oral, Daily  sulfur hexafluoride microsphr, 2 mL, intravenous, Once in imaging  thiamine, 100 mg, oral, Daily      Continuous medications     PRN medications  PRN medications: budesonide, dextrose 10 % in water (D10W), dextrose, formoterol, glucagon, ipratropium, ipratropium-albuteroL  Results from last 7 days   Lab Units 03/11/24  0622 03/10/24  0351 03/09/24  0739   WBC AUTO x10*3/uL 5.2 6.1 7.5   RBC AUTO x10*6/uL 2.30* 2.48* 2.64*   HEMOGLOBIN g/dL 6.8* 7.3* 7.9*     Results from last 7 days   Lab Units 03/11/24  0622 03/10/24  0351 03/09/24  0739   SODIUM mmol/L 143 144 142   POTASSIUM mmol/L 4.3 5.2 4.4   CHLORIDE mmol/L 113* 113* 110*   CO2 mmol/L 23 22 23   BUN mg/dL 32* 34* 39*   CREATININE mg/dL 1.89* 1.68* 1.56*   CALCIUM mg/dL 8.3* 8.5* 8.6   BILIRUBIN TOTAL mg/dL 0.4 0.4 0.4   ALT U/L 20 23 28   AST U/L 20 25 29       Transthoracic Echo (TTE) Complete    Result Date: 3/11/2024            [facility] [id number] [facility], [place] [id number]    Tel [phone] Fax [phone] TRANSTHORACIC ECHOCARDIOGRAM REPORT  Patient Name:      LISBET NENITA        Reading Physician:    [id number] Claudio Gomez MD Study Date:         3/11/2024             Ordering Provider:    [id number] YULIANA BARRETO MRN/PID:           [id number]              Fellow: Accession#:        [id number]          Nurse: Date of Birth/Age: 1960 / 63 years Sonographer:          Suni Thomas                                                                RDCS Gender:            F                     Additional Staff: Height:            167.00 cm             Admit Date: Weight:            96.16 kg              Admission Status:     Inpatient -                                                                Routine BSA / BMI:         2.04 m2 / 34.48 kg/m2 Department Location:  [facility] CCU SD Blood Pressure: 136 /65 mmHg Study Type:    TRANSTHORACIC ECHO (TTE) COMPLETE Diagnosis/ICD: Unspecified atrial fibrillation-I48.91 Indication:    AFiB Patient History: Diabetes:          Yes Pertinent History: CVA, A-Fib and HTN. Study Detail: The following Echo studies were performed: 2D, M-Mode, Doppler and               color flow. Technically challenging study due to patient lying in               supine position.  PHYSICIAN INTERPRETATION: Left Ventricle: Left ventricular systolic function is mildly decreased, with an estimated ejection fraction of 40-45%. There is global hypokinesis of the left ventricle with minor regional variations. The left ventricular cavity size is upper limits of normal. The left ventricular septal wall thickness is mildly increased. There is mildly increased left ventricular posterior wall thickness. There is mild concentric left ventricular hypertrophy. Spectral Doppler shows a normal pattern of left ventricular diastolic filling. Left Atrium: The left atrium is mildly dilated. Right Ventricle: The right ventricle is upper limits of normal in size. There is low normal right ventricular systolic function. Right Atrium: The right atrium is upper limits of normal in size. Aortic Valve: The  aortic valve is trileaflet. There is mild aortic valve thickening. There is no evidence of aortic valve stenosis. There is no evidence of aortic valve regurgitation. The peak instantaneous gradient of the aortic valve is 7.7 mmHg. Mitral Valve: The mitral valve is mildly thickened. There is mild to moderate mitral valve regurgitation which is centrally directed. Tricuspid Valve: The tricuspid valve is structurally normal. There is mild tricuspid regurgitation. The Doppler estimated RVSP is mild to moderately elevated at 49.5 mmHg. Pulmonic Valve: The pulmonic valve is structurally normal. There is trace to mild pulmonic valve regurgitation. Pericardium: There is no pericardial effusion noted. Aorta: The aortic root is normal. There is no dilatation of the ascending aorta. There is no dilatation of the aortic root. Pulmonary Artery: The main pulmonary artery is normal in size, and position, with normal bifurcation into the left and right pulmonary arteries. Systemic Veins: The inferior vena cava appears dilated. The hepatic vein appears to be of normal size. There is less than 50% IVC collapse with inspiration.  CONCLUSIONS:  1. Left ventricular systolic function is mildly decreased with a 40-45% estimated ejection fraction.  2. There is low normal right ventricular systolic function.  3. Mild to moderate mitral valve regurgitation.  4. Mild to moderately elevated right ventricular systolic pressure.  5. Aortic valve stenosis is not present.  6. There is global hypokinesis of the left ventricle with minor regional variations. QUANTITATIVE DATA SUMMARY: 2D MEASUREMENTS:                           Normal Ranges: LAs:           3.40 cm    (2.7-4.0cm) IVSd:          1.27 cm    (0.6-1.1cm) LVPWd:         1.40 cm    (0.6-1.1cm) LVIDd:         4.90 cm    (3.9-5.9cm) LVIDs:         3.90 cm LV Mass Index: 131.0 g/m2 LV % FS        20.4 % LA VOLUME:                             Normal Ranges: LA Area A4C:     24.0 cm2 LA Area  A2C:     22.0 cm2 LA Volume Index: 39.0 ml/m2 M-MODE MEASUREMENTS:                  Normal Ranges: Ao Root: 2.80 cm (2.0-3.7cm) AoV Exc: 1.80 cm (1.5-2.5cm) AORTA MEASUREMENTS:                    Normal Ranges: AoV Exc:   1.80 cm (1.5-2.5cm) Asc Ao, d: 3.10 cm (2.1-3.4cm) LV SYSTOLIC FUNCTION BY 2D PLANIMETRY (MOD):                     Normal Ranges: EF-A4C View: 42.2 % (>=55%) EF-A2C View: 41.0 % EF-Biplane:  43.1 % LV DIASTOLIC FUNCTION:                               Normal Ranges: MV e'             0.08 m/s    (>8.0) MV lateral e'     0.09 m/s MV medial e'      0.07 m/s PulmV Sys Polo:    53.10 cm/s PulmV Echavarria Polo:   48.80 cm/s PulmV S/D Polo:    1.10 PulmV A Revs Dur: 208.00 msec MITRAL INSUFFICIENCY:                           Normal Ranges: PISA Radius:  0.3 cm MR VTI:       237.00 cm MR Vmax:      567.67 cm/s MR Alias Polo: 84.7 cm/s MR Volume:    20.00 ml MR Flow Rt:   47.90 ml/s MR EROA:      0.08 cm2 AORTIC VALVE:                         Normal Ranges: AoV Vmax:      1.39 m/s (<=1.7m/s) AoV Peak P.7 mmHg (<20mmHg) LVOT Max Polo:  0.77 m/s (<=1.1m/s) LVOT Diameter: 2.10 cm  (1.8-2.4cm) AoV Area,Vmax: 1.92 cm2 (2.5-4.5cm2)  RIGHT VENTRICLE: RV Basal 4.50 cm RV Mid   2.60 cm RV Major 8.9 cm TAPSE:   15.1 mm RV s'    0.07 m/s TRICUSPID VALVE/RVSP:                             Normal Ranges: Peak TR Velocity: 3.41 m/s RV Syst Pressure: 49.5 mmHg (< 30mmHg) PULMONIC VALVE:                        Normal Ranges: PV Accel Time: 90 msec (>120ms) Pulmonary Veins: PulmV A Revs Dur: 208.00 msec PulmV Echavarria Polo:   48.80 cm/s PulmV S/D Polo:    1.10 PulmV Sys Polo:    53.10 cm/s  [id number] Claudio Gomez MD Electronically signed on 3/11/2024 at 11:12:36 AM  ** Final (Updated) **        Assessment/Plan   Principal Problem:    Hypothermia due to non-environmental cause  Active Problems:    Lupus (CMS/HCC)    Hypoglycemia    Adrenal insufficiency, likely 2/2 chronic steroid use and immunotherapy  -Continue Solu-Cortef 50 mg twice  "daily today  -Continue prednisone 20 mg starting 3/12  -Endocrine consulted: Recommending to wean steroids as tolerable.  Discrepancy on what prednisone dose she is taking at home: Initially thought she was taking 20 mg twice daily, though admits today she is taking 15 mg daily (\"or so she thinks\" Per endocrinology note)  -Blood cultures and urine cultures (3/9) NTD  -COVID-19 and flu negative  -dsDNA less than 1     IDDM 2  History of hypoglycemia  -Hypoglycemia protocol--endocrinology recommending C-peptide and beta hydroxybutyrate prior to giving hypoglycemia protocol  -BS stable  -Insulin sliding scale     Atrial fibrillation on Eliquis  - Continue home Eliquis 2.5 mg twice daily    HFrEF  Lower extremity swelling with exertional dyspnea   -TTE 3/11/2024: LVEF 40 to 45% and low normal RV systolic function.  Mild/moderate MR.  Moderately elevated RVSP of 49.5, global hypokinesis of left ventricle with minor regional variations  -Last TTE 3/25/2023 demonstrating LVEF of 55 to 60% with moderately dilated LA  -Indications for GDMT, though difficult given: allergy to lisinopril and mild evidence of JED currently. Do not feel BB is safe given she presented bradycardic in the 40's. Jardiance considered, though not felt safe given hypoglycemic episodes.  -Recommended to follow up with cardiology in outpatient setting for GDMT management    Mild JED  -Cr up-trended to 1.89 from 1.68  -TTE demonstrating dilated IVC, possibly owing to mild cardiorenal syndrome  -will challenge with Lasix 20 mg IV and trend UO with strict I/O and monitor RFP    Acute blood loss anemia  -Hgb 6.8  -No active s/s active bleeding  -Type and screen  -consent obtained -- to transfuse 1U PRBC    History of SLE cerebritis  Andrew arthropathy  - Continue home mycophenolate Moffa 200 mg twice daily  -Anti-dsDNA < 1, less likely SLE flare     History of seizures  - Continue home Keppra 500 mg twice daily     COPD not in exacerbation  - DuoNebs every 6 " hours  - DuoNebs every 4 hours as needed for shortness of breath     Consults: endocrine   DVT Ppx: eliquis   Diet: diabetic   CODE STATUS: FULL       Disposition: Admitted as adrenal insufficiency requiring hydrocortisone and endocrine consulted. HDS, afebrile, and sugars stable. Continuing with steroid taper    Case discussed with attending physician,    Yassine Win, DO  PGY-2 Internal Medicine

## 2024-03-11 NOTE — PROGRESS NOTES
"    Endocrinology Inpatient Consult Progress Note     PATIENT NAME: Bing Holliday  MRN: [id number]  DATE: 3/11/2024    CONSULTING PHYSICIAN: Dr. LINA Moreno  REASON FOR CONSULT: Secondary AI. Hypothermia. Hypoglycemia.      Interval Events     Remains on stress dose steroids.  No acute events overnight.  No hyperglycemia and no hypoglycemia.  No hypothermia in the last 24 hours.    She has no complaints for me this morning.  She states that she has been eating all her meals.  She states that she does not feel cold specifically.    She does state in retrospect she thinks she only takes 15 mg of prednisone once daily as per the last endocrinology recommendations.      Physical Examination     /65 (BP Location: Right arm, Patient Position: Sitting)   Pulse 63   Temp 36.1 °C (97 °F) (Temporal)   Resp 18   Ht 1.676 m (5' 6\")   Wt 96.5 kg (212 lb 11.2 oz)   LMP  (LMP Unknown)   SpO2 94%   BMI 34.33 kg/m²   No acute distress.  Appropriate affect.  Frontal alopecia  Regular rate and rhythm.  Nonlabored respiration.  Soft nontender nondistended abdomen.  No pedal edema bilaterally.      Medications     Reviewed MAR       Data     Recent Labs and Imaging Reviewed      Assessment / Plan        # Hypoglycemia  From initial consult note.  Has not occurred in the last 24 hours.  She does have a low point-of-care glucose, I would like this tested on another finger to confirm.  Can also obtain a BMP to confirm as well if this is truly hypoglycemia.    At that point I would also recommend obtaining a C-peptide and beta hydroxybutyrate before giving hypoglycemia protocol.     # Uncontrolled Type 2 Diabetes Mellitus  Home Regimen: None.  Hemoglobin A1c (7/23): 6.9%  Nutrtion: Regular Diet.     Given that the patient presented with hypoglycemia, hold all therapies.  Continue to check her blood sugars with meals and at bedtime.  Remains on sliding scale.     # Adrenal Insufficiency  Initial consult note for my clinical " formulation.  Patient is on hydrocortisone 50 mg every 6 hours.  She has retracted that she takes 20 mg twice daily but rather 15 mg daily - or so she thinks.  Wean as able.    Endocrinology at [facility] recommended 15 mg daily.  Even this is supraphysiologic but reasonable given that anecdotally she has responded with her low sugars as well as hypothermia to glucocorticoids.     # Osteoporosis  In the setting of chronic glucocorticoid use.  This will be further managed as an outpatient.  She probably would benefit from a bisphosphonate, these agents are best studied with steroid-induced adynamic bone disease.         Avi Sloan, DO  Endocrinology, Diabetes, and Metabolism    Available via EPIC Messenger    Please excuse any typographical or unwanted errors within this documentation as voice recognition software was used to dictate this note.

## 2024-03-11 NOTE — PROGRESS NOTES
Nurse to follow up with patient to attempt and finish med review. However, patient was admitted to [facility] 3/9 after a fall, son checked on her in the morning and BS was 50. Went to get her juice and came back and found her on the floor - called ambulance. ED found her to be hypothermic.

## 2024-03-11 NOTE — NURSING NOTE
Patient blood transfusion started. Patient vitals maintained. Patient tolerating blood well. Will continue to monitor.

## 2024-03-11 NOTE — PROGRESS NOTES
03/11/24 1120   Discharge Planning   Living Arrangements Children   Support Systems Children   Assistance Needed Walker to ambulate   Type of Residence Private residence   Number of Stairs to Enter Residence 0   Number of Stairs Within Residence 24  (10 steps up and 14 steps down)   Who is requesting discharge planning? Provider   Home or Post Acute Services In home services   Type of Home Care Services Home nursing visits;Home OT;Home PT   Patient expects to be discharged to: Home with [facility] to resume   Does the patient need discharge transport arranged? Yes   RoundTrip coordination needed? Yes   Has discharge transport been arranged? No     Met with patient at bedside. Verified PCP and states the address is her address but she now lives with son in [place]. Family is living at the home address. Patient uses [facility] on [address] In [place] for medications and has no issues obtaining them. Uses a walker to ambulate, no driving and is somewhat independent with ADL's. Patient does need assistance with showers and will need transportation now that son has gone back to work. Patient states that insurance company can arrange transport when needed. Does have family support to help when needed. Patient states will go back to son's house on discharge with [facility]. This TCC will make sure patient is active with the [facility] agency. TCC team to follow patient for all discharge needs.

## 2024-03-12 LAB
ALBUMIN SERPL BCP-MCNC: 2.9 G/DL (ref 3.4–5)
ANION GAP SERPL CALC-SCNC: 13 MMOL/L (ref 10–20)
BUN SERPL-MCNC: 35 MG/DL (ref 6–23)
CALCIUM SERPL-MCNC: 8.3 MG/DL (ref 8.6–10.3)
CHLORIDE SERPL-SCNC: 111 MMOL/L (ref 98–107)
CO2 SERPL-SCNC: 25 MMOL/L (ref 21–32)
CREAT SERPL-MCNC: 1.94 MG/DL (ref 0.5–1.05)
EGFRCR SERPLBLD CKD-EPI 2021: 29 ML/MIN/1.73M*2
ERYTHROCYTE [DISTWIDTH] IN BLOOD BY AUTOMATED COUNT: 21.3 % (ref 11.5–14.5)
GLUCOSE BLD MANUAL STRIP-MCNC: 131 MG/DL (ref 74–99)
GLUCOSE BLD MANUAL STRIP-MCNC: 156 MG/DL (ref 74–99)
GLUCOSE BLD MANUAL STRIP-MCNC: 299 MG/DL (ref 74–99)
GLUCOSE BLD MANUAL STRIP-MCNC: 75 MG/DL (ref 74–99)
GLUCOSE SERPL-MCNC: 87 MG/DL (ref 74–99)
HCT VFR BLD AUTO: 28.2 % (ref 36–46)
HGB BLD-MCNC: 8.3 G/DL (ref 12–16)
MAGNESIUM SERPL-MCNC: 1.47 MG/DL (ref 1.6–2.4)
MCH RBC QN AUTO: 29.6 PG (ref 26–34)
MCHC RBC AUTO-ENTMCNC: 29.4 G/DL (ref 32–36)
MCV RBC AUTO: 101 FL (ref 80–100)
NRBC BLD-RTO: 0 /100 WBCS (ref 0–0)
PHOSPHATE SERPL-MCNC: 2.8 MG/DL (ref 2.5–4.9)
PLATELET # BLD AUTO: 65 X10*3/UL (ref 150–450)
POTASSIUM SERPL-SCNC: 3.6 MMOL/L (ref 3.5–5.3)
RBC # BLD AUTO: 2.8 X10*6/UL (ref 4–5.2)
SODIUM SERPL-SCNC: 145 MMOL/L (ref 136–145)
WBC # BLD AUTO: 4.6 X10*3/UL (ref 4.4–11.3)

## 2024-03-12 PROCEDURE — 2500000001 HC RX 250 WO HCPCS SELF ADMINISTERED DRUGS (ALT 637 FOR MEDICARE OP): Performed by: STUDENT IN AN ORGANIZED HEALTH CARE EDUCATION/TRAINING PROGRAM

## 2024-03-12 PROCEDURE — 83735 ASSAY OF MAGNESIUM: CPT

## 2024-03-12 PROCEDURE — 2500000004 HC RX 250 GENERAL PHARMACY W/ HCPCS (ALT 636 FOR OP/ED)

## 2024-03-12 PROCEDURE — 2500000001 HC RX 250 WO HCPCS SELF ADMINISTERED DRUGS (ALT 637 FOR MEDICARE OP)

## 2024-03-12 PROCEDURE — 85027 COMPLETE CBC AUTOMATED: CPT

## 2024-03-12 PROCEDURE — 2500000002 HC RX 250 W HCPCS SELF ADMINISTERED DRUGS (ALT 637 FOR MEDICARE OP, ALT 636 FOR OP/ED): Performed by: STUDENT IN AN ORGANIZED HEALTH CARE EDUCATION/TRAINING PROGRAM

## 2024-03-12 PROCEDURE — 2500000004 HC RX 250 GENERAL PHARMACY W/ HCPCS (ALT 636 FOR OP/ED): Performed by: STUDENT IN AN ORGANIZED HEALTH CARE EDUCATION/TRAINING PROGRAM

## 2024-03-12 PROCEDURE — 99233 SBSQ HOSP IP/OBS HIGH 50: CPT

## 2024-03-12 PROCEDURE — 36415 COLL VENOUS BLD VENIPUNCTURE: CPT

## 2024-03-12 PROCEDURE — 1200000002 HC GENERAL ROOM WITH TELEMETRY DAILY

## 2024-03-12 PROCEDURE — 82947 ASSAY GLUCOSE BLOOD QUANT: CPT

## 2024-03-12 PROCEDURE — 80069 RENAL FUNCTION PANEL: CPT

## 2024-03-12 RX ORDER — MAGNESIUM SULFATE HEPTAHYDRATE 40 MG/ML
2 INJECTION, SOLUTION INTRAVENOUS ONCE
Status: COMPLETED | OUTPATIENT
Start: 2024-03-12 | End: 2024-03-12

## 2024-03-12 RX ORDER — RISPERIDONE 0.5 MG/1
0.5 TABLET ORAL NIGHTLY
Status: DISCONTINUED | OUTPATIENT
Start: 2024-03-12 | End: 2024-03-14 | Stop reason: HOSPADM

## 2024-03-12 RX ADMIN — LEVETIRACETAM 500 MG: 500 TABLET, FILM COATED ORAL at 18:20

## 2024-03-12 RX ADMIN — NYSTATIN 1 APPLICATION: 100000 POWDER TOPICAL at 09:24

## 2024-03-12 RX ADMIN — POTASSIUM PHOSPHATE, MONOBASIC 500 MG: 500 TABLET, SOLUBLE ORAL at 09:28

## 2024-03-12 RX ADMIN — AMLODIPINE BESYLATE 2.5 MG: 2.5 TABLET ORAL at 09:28

## 2024-03-12 RX ADMIN — ATORVASTATIN CALCIUM 40 MG: 40 TABLET, FILM COATED ORAL at 20:22

## 2024-03-12 RX ADMIN — PREDNISONE 20 MG: 20 TABLET ORAL at 09:28

## 2024-03-12 RX ADMIN — MAGNESIUM SULFATE HEPTAHYDRATE 2 G: 40 INJECTION, SOLUTION INTRAVENOUS at 09:29

## 2024-03-12 RX ADMIN — APIXABAN 2.5 MG: 2.5 TABLET, FILM COATED ORAL at 20:22

## 2024-03-12 RX ADMIN — INSULIN LISPRO 2 UNITS: 100 INJECTION, SOLUTION INTRAVENOUS; SUBCUTANEOUS at 12:50

## 2024-03-12 RX ADMIN — Medication 5 MG: at 20:22

## 2024-03-12 RX ADMIN — PANTOPRAZOLE SODIUM 40 MG: 40 TABLET, DELAYED RELEASE ORAL at 09:28

## 2024-03-12 RX ADMIN — MYCOPHENOLATE MOFETIL 1000 MG: 250 CAPSULE ORAL at 20:22

## 2024-03-12 RX ADMIN — APIXABAN 2.5 MG: 2.5 TABLET, FILM COATED ORAL at 09:28

## 2024-03-12 RX ADMIN — LEVETIRACETAM 500 MG: 500 TABLET, FILM COATED ORAL at 04:30

## 2024-03-12 RX ADMIN — RISPERIDONE 0.5 MG: 0.5 TABLET ORAL at 20:22

## 2024-03-12 RX ADMIN — MYCOPHENOLATE MOFETIL 1000 MG: 250 CAPSULE ORAL at 09:28

## 2024-03-12 RX ADMIN — NYSTATIN 1 APPLICATION: 100000 POWDER TOPICAL at 20:22

## 2024-03-12 RX ADMIN — FOLIC ACID 1 MG: 1 TABLET ORAL at 09:28

## 2024-03-12 RX ADMIN — THIAMINE HCL TAB 100 MG 100 MG: 100 TAB at 09:28

## 2024-03-12 ASSESSMENT — COGNITIVE AND FUNCTIONAL STATUS - GENERAL
DRESSING REGULAR UPPER BODY CLOTHING: A LOT
EATING MEALS: A LITTLE
DAILY ACTIVITIY SCORE: 14
DRESSING REGULAR LOWER BODY CLOTHING: A LOT
HELP NEEDED FOR BATHING: A LOT
CLIMB 3 TO 5 STEPS WITH RAILING: TOTAL
WALKING IN HOSPITAL ROOM: A LOT
EATING MEALS: A LITTLE
MOVING TO AND FROM BED TO CHAIR: A LOT
HELP NEEDED FOR BATHING: A LOT
STANDING UP FROM CHAIR USING ARMS: A LOT
MOBILITY SCORE: 11
MOVING FROM LYING ON BACK TO SITTING ON SIDE OF FLAT BED WITH BEDRAILS: A LOT
DRESSING REGULAR UPPER BODY CLOTHING: A LOT
TURNING FROM BACK TO SIDE WHILE IN FLAT BAD: A LOT
MOBILITY SCORE: 11
PERSONAL GROOMING: A LOT
TOILETING: A LOT
TOILETING: A LOT
MOVING TO AND FROM BED TO CHAIR: A LOT
MOVING FROM LYING ON BACK TO SITTING ON SIDE OF FLAT BED WITH BEDRAILS: A LOT
CLIMB 3 TO 5 STEPS WITH RAILING: TOTAL
DRESSING REGULAR LOWER BODY CLOTHING: A LOT
WALKING IN HOSPITAL ROOM: A LOT
STANDING UP FROM CHAIR USING ARMS: A LOT
DAILY ACTIVITIY SCORE: 13
TURNING FROM BACK TO SIDE WHILE IN FLAT BAD: A LOT
PERSONAL GROOMING: A LITTLE

## 2024-03-12 ASSESSMENT — PAIN SCALES - GENERAL
PAINLEVEL_OUTOF10: 0 - NO PAIN
PAINLEVEL_OUTOF10: 0 - NO PAIN

## 2024-03-12 NOTE — NURSING NOTE
EOS note: patient remained oriented x4. Very pleasant this shift. Continued sugar checks ACHS and insulin coverage as needed. VSS but angelica. Providers aware, sinus angelica on tele. No complaints of pain. On seizure precautions. During morning report patient stated she was seeing things such as spots and things climbing on staff members backs - A. Quirino said it may be due to lack of sleep from night before. Patient rested quietly through day and said she is no longer seeing things    Pt resting at this time. Bed is in lowest position and locked with alarm on. Call light and personal possesions are within reach.

## 2024-03-12 NOTE — PROGRESS NOTES
"    Endocrinology Inpatient Consult Progress Note     PATIENT NAME: Bing Holliday  MRN: [id number]  DATE: 3/12/2024    CONSULTING PHYSICIAN: Dr. LINA Moreno  REASON FOR CONSULT: Secondary AI. Hypothermia. Hypoglycemia.      Interval Events     No acute events overnight.  Patient was transferred to the regular nursing floor from the [facility].  She has no complaints from.  Specifically she denies any palpitations, diaphoresis, lightheadedness, blurry vision.  States that her sugars have actually been high.      Physical Examination     /75   Pulse 87   Temp 35.4 °C (95.7 °F) (Temporal)   Resp 16   Ht 1.676 m (5' 6\")   Wt 96.5 kg (212 lb 11.2 oz)   LMP  (LMP Unknown)   SpO2 100%   BMI 34.33 kg/m²   No acute distress.  Appropriate affect.  Frontal alopecia  Regular rate and rhythm.  Nonlabored respiration.  Soft nontender nondistended abdomen.  No pedal edema bilaterally.      Medications     Reviewed MAR       Data     Recent Labs and Imaging Reviewed      Assessment / Plan        # Hypoglycemia  From initial consult note.  Has not occurred in the last 48 hours.  As per my initial note, let us confirm hypoglycemia using a chemistry before treatment.  If she does have low sugars, please obtain a beta hydroxybutyrate as well as C-peptide.     # Uncontrolled Type 2 Diabetes Mellitus  Home Regimen: None.  Hemoglobin A1c (7/23): 6.9%  Nutrtion: Regular Diet.     Patient is asked to become hyperglycemic, likely due to glucocorticoids.  She is on insulin sliding scale.     # Adrenal Insufficiency  Please see my initial consult note for my clinical formulation.  She has responded quite nicely to stress dose steroids.  This was changed to prednisone 20 mg daily.  This is a supraphysiologic dose, but I do not think is unreasonable given the amount of readmissions this patient has.  She does follow with outpatient endocrinology, perhaps they would be able to wean her to a more physiologic dose to help prevent the myriad " of complications associated with supraphysiologic glucocorticoid use.     # Osteoporosis  In the setting of chronic glucocorticoid use.  This will be further managed as an outpatient.  She probably would benefit from a bisphosphonate, these agents are best studied with steroid-induced adynamic bone disease.         Avi Sloan, DO  Endocrinology, Diabetes, and Metabolism    Available via EPIC Messenger    Please excuse any typographical or unwanted errors within this documentation as voice recognition software was used to dictate this note.

## 2024-03-12 NOTE — CARE PLAN
The patient's goals for the shift include      The clinical goals for the shift include see care plan      Problem: Diabetes  Goal: Achieve decreasing blood glucose levels by end of shift  3/12/2024 1705 by Francisca Aguila RN  Outcome: Progressing  3/12/2024 1704 by Francisca Aguila RN  Outcome: Progressing  Flowsheets (Taken 3/11/2024 1013 by Helen Leahy RN)  Achieve decreasing blood glucose levels by end of shift: Med administration/monitoring of effect  Goal: Increase stability of blood glucose readings by end of shift  3/12/2024 1705 by Francisca Aguila RN  Outcome: Progressing  3/12/2024 1704 by Francisca Aguila RN  Outcome: Progressing  Flowsheets (Taken 3/12/2024 1704)  Increase stability of blood glucose readings by end of shift: Med administration/monitoring of effect  Goal: Decrease in ketones present in urine by end of shift  3/12/2024 1705 by Francisca Aguila RN  Outcome: Progressing  3/12/2024 1704 by Francisca Aguila RN  Outcome: Progressing  Flowsheets (Taken 3/12/2024 1704)  Decrease in ketones present in urine by end of shift: Med administration/monitoring of effect  Goal: Maintain electrolyte levels within acceptable range throughout shift  3/12/2024 1705 by Francisca Aguila RN  Outcome: Progressing  3/12/2024 1704 by Francisca Aguila RN  Outcome: Progressing  Flowsheets (Taken 3/12/2024 1704)  Maintain electrolyte levels within acceptable range throughout shift: Med administration/monitoring of effect  Goal: Maintain glucose levels >70mg/dl to <250mg/dl throughout shift  3/12/2024 1705 by Francisca Aguila RN  Outcome: Progressing  3/12/2024 1704 by Francisca Aguila RN  Outcome: Progressing  Flowsheets (Taken 3/12/2024 1704)  Maintain glucose levels >70mg/dl to <250mg/dl throughout shift: Med administration/monitoring of effect  Goal: No changes in neurological exam by end of shift  3/12/2024 1705 by Francisca Aguila RN  Outcome: Progressing  3/12/2024 1704 by Francisca Aguila RN  Outcome:  Progressing  Goal: Learn about and adhere to nutrition recommendations by end of shift  3/12/2024 1705 by Francisca Aguila RN  Outcome: Progressing  3/12/2024 1704 by Francisca Aguila RN  Outcome: Progressing  Flowsheets (Taken 3/12/2024 1704)  Learn about and adhere to nutrition recommendations by end of shift: Ensure/encourage compliance with appropriate diet  Goal: Vital signs within normal range for age by end of shift  3/12/2024 1705 by Francisca Aguila RN  Outcome: Progressing  3/12/2024 1704 by Francisca Aguila RN  Outcome: Progressing  Flowsheets (Taken 3/12/2024 1704)  Vital signs within normal range for age by end of shift: Med administration/monitoring of effect  Goal: Increase self care and/or family involovement by end of shift  3/12/2024 1705 by Francisca Aguila RN  Outcome: Progressing  3/12/2024 1704 by Francisca Aguila RN  Outcome: Progressing  Flowsheets (Taken 3/12/2024 1704)  Increase self care and/or family involovement by end of shift: Self monitor blood glucose with staff oversight  Goal: Receive DSME education by end of shift  3/12/2024 1705 by Francisca Aguila RN  Outcome: Progressing  3/12/2024 1704 by Francisca Aguila RN  Outcome: Progressing  Flowsheets (Taken 3/12/2024 1704)  Receive DSME education by end of shift: Provide patient centered education on Diabetic Self Management Education     Problem: Skin  Goal: Decreased wound size/increased tissue granulation at next dressing change  3/12/2024 1705 by Francisca Aguila RN  Outcome: Progressing  3/12/2024 1704 by Francisca Aguila RN  Outcome: Progressing  Flowsheets (Taken 3/12/2024 0514 by Stephie Freedman LPN)  Decreased wound size/increased tissue granulation at next dressing change: Promote sleep for wound healing  Goal: Participates in plan/prevention/treatment measures  3/12/2024 1705 by Francisca Aguila RN  Outcome: Progressing  3/12/2024 1704 by Francisca Aguila RN  Outcome: Progressing  Flowsheets (Taken 3/12/2024 0514 by Stephie Edward  SUSHMA Freedman)  Participates in plan/prevention/treatment measures: Elevate heels  Goal: Prevent/manage excess moisture  3/12/2024 1705 by Francisca Aguila RN  Outcome: Progressing  3/12/2024 1704 by Francisca Aguila RN  Outcome: Progressing  Flowsheets (Taken 3/12/2024 0514 by Stephie Freedman LPN)  Prevent/manage excess moisture: Cleanse incontinence/protect with barrier cream  Goal: Prevent/minimize sheer/friction injuries  3/12/2024 1705 by Francisca Aguila RN  Outcome: Progressing  3/12/2024 1704 by Francisca Aguila RN  Outcome: Progressing  Flowsheets (Taken 3/12/2024 0514 by Stephie Freedman LPN)  Prevent/minimize sheer/friction injuries: Turn/reposition every 2 hours/use positioning/transfer devices  Goal: Promote/optimize nutrition  3/12/2024 1705 by Francisca Aguila RN  Outcome: Progressing  3/12/2024 1704 by Francisca Aguila RN  Outcome: Progressing  Flowsheets (Taken 3/12/2024 0514 by Stephie Freedman LPN)  Promote/optimize nutrition: Consume > 50% meals/supplements  Goal: Promote skin healing  3/12/2024 1705 by Francisca Aguila RN  Outcome: Progressing  3/12/2024 1704 by Francisca Aguila RN  Outcome: Progressing  Flowsheets (Taken 3/12/2024 0514 by Stephie Freedman LPN)  Promote skin healing: Turn/reposition every 2 hours/use positioning/transfer devices

## 2024-03-12 NOTE — CARE PLAN
Pt had no acute changes was noted. Pt was able to rest overnight. Pt currently on seizure precautions this shift. Pt hemoglobin trending up this shift. Pt had no c/o pain noted. Pt safety been maintained.

## 2024-03-12 NOTE — PROGRESS NOTES
Pt is active with [facility]. Awaiting PT/OT evals to confirm safe for pt to return home as she prefers.

## 2024-03-12 NOTE — PROGRESS NOTES
Bing Holliday is a 63 y.o. female on day 3 of admission presenting with Hypothermia due to non-environmental cause.    Subjective   No acute events overnight.    Patient seen and examined this morning.  Upon initial evaluation, she is having visual hallucinations.  States that she sees people standing outside of her door holding signs.  Additionally, sees brown specks on her tray as well as items hanging from her IV pole.  She is aware that these items are now present, though she endorses seeing them.  She has no auditory hallucinations.  She has no abnormal paresthesias and otherwise feels well.  She is ANO x 4 on my assessment.  Saturating appropriate on room air.  No constitutional symptoms.  Tolerating p.o. diet appropriately without nausea.  No angina or shortness of breath.  She has had no hypoglycemia events overnight.  She had mild hypothermia to 95.7 at 4 AM 3/12/2024.    Objective     Physical Exam  Constitutional:       Appearance: Normal appearance.  ANO x 4  HENT:      Head: Normocephalic.   Cardiovascular:      Rate and Rhythm: Normal rate and regular rhythm.      Heart sounds: Murmur heard with prominent S2.   Pulmonary:      Effort: No respiratory distress.      Breath sounds: Normal breath sounds. No wheezing, rhonchi or rales.   Abdominal:      Palpations: Abdomen is soft.      Tenderness: There is no abdominal tenderness. There is no guarding.   Musculoskeletal:         General: No swelling or tenderness.      Right lower le+ nonpitting edema present.      Left lower le+ nonpitting edema present.   Skin:     General: Skin is dry.      Capillary Refill: Capillary refill takes less than 2 seconds.   Neurological:      General: No focal deficit present.      Mental Status: She is alert and oriented to person, place, and time.   Psychiatric:         Mood and Affect: Mood normal.   Last Recorded Vitals  Blood pressure 140/79, pulse 51, temperature 35 °C (95 °F), temperature source Temporal,  "resp. rate 18, height 1.676 m (5' 6\"), weight 96.5 kg (212 lb 11.2 oz), SpO2 97 %.  Intake/Output last 3 Shifts:  I/O last 3 completed shifts:  In: 2030 (21 mL/kg) [P.O.:1680; Blood:350]  Out: 3425 (35.5 mL/kg) [Urine:3425 (1 mL/kg/hr)]  Weight: 96.5 kg     Relevant Results  Scheduled medications  amLODIPine, 2.5 mg, oral, Daily  apixaban, 2.5 mg, oral, BID  atorvastatin, 40 mg, oral, Daily  folic acid, 1 mg, oral, Daily  insulin lispro, 0-10 Units, subcutaneous, TID with meals  levETIRAcetam, 500 mg, oral, q12h  melatonin, 5 mg, oral, Nightly  mycophenolate, 1,000 mg, oral, BID  nystatin, 1 Application, Topical, BID  pantoprazole, 40 mg, oral, Daily  perflutren lipid microspheres, 0.5-10 mL of dilution, intravenous, Once in imaging  perflutren protein A microsphere, 0.5 mL, intravenous, Once in imaging  predniSONE, 20 mg, oral, Daily  risperiDONE, 0.5 mg, oral, Nightly  [Held by provider] sodium zirconium cyclosilicate, 10 g, oral, Daily  sulfur hexafluoride microsphr, 2 mL, intravenous, Once in imaging  thiamine, 100 mg, oral, Daily      Continuous medications     PRN medications  PRN medications: budesonide, dextrose 10 % in water (D10W), dextrose, formoterol, glucagon, ipratropium, ipratropium-albuteroL  Results from last 7 days   Lab Units 03/12/24  0617 03/11/24 2021 03/11/24  0622 03/10/24  0351   WBC AUTO x10*3/uL 4.6  --  5.2 6.1   RBC AUTO x10*6/uL 2.80*  --  2.30* 2.48*   HEMOGLOBIN g/dL 8.3* 8.9* 6.8* 7.3*     Results from last 7 days   Lab Units 03/12/24  0618 03/11/24  0622 03/10/24  0351 03/09/24  0739   SODIUM mmol/L 145 143 144 142   POTASSIUM mmol/L 3.6 4.3 5.2 4.4   CHLORIDE mmol/L 111* 113* 113* 110*   CO2 mmol/L 25 23 22 23   BUN mg/dL 35* 32* 34* 39*   CREATININE mg/dL 1.94* 1.89* 1.68* 1.56*   CALCIUM mg/dL 8.3* 8.3* 8.5* 8.6   PHOSPHORUS mg/dL 2.8  --   --   --    MAGNESIUM mg/dL 1.47*  --   --   --    BILIRUBIN TOTAL mg/dL  --  0.4 0.4 0.4   ALT U/L  --  20 23 28   AST U/L  --  20 25 29 "       Transthoracic Echo (TTE) Complete    Result Date: 3/11/2024            [facility] [id number] [facility], [facility] [address]    Tel [phone] Fax [phone] TRANSTHORACIC ECHOCARDIOGRAM REPORT  Patient Name:      LISBET GUTIERRES        Swati Physician:    [id number] Claudio Gomez MD Study Date:        3/11/2024             Ordering Provider:    [id number] YULIANA BARRETO MRN/PID:           [id number]              Fellow: Accession#:        [id number]          Nurse: Date of Birth/Age: 1960 / 63 years Sonographer:          Suni Thomas RDCS Gender:            F                     Additional Staff: Height:            167.00 cm             Admit Date: Weight:            96.16 kg              Admission Status:     Inpatient -                                                                Routine BSA / BMI:         2.04 m2 / 34.48 kg/m2 Department Location:  [facility] Blood Pressure: 136 /65 mmHg Study Type:    TRANSTHORACIC ECHO (TTE) COMPLETE Diagnosis/ICD: Unspecified atrial fibrillation-I48.91 Indication:    AFiB Patient History: Diabetes:          Yes Pertinent History: CVA, A-Fib and HTN. Study Detail: The following Echo studies were performed: 2D, M-Mode, Doppler and               color flow. Technically challenging study due to patient lying in               supine position.  PHYSICIAN INTERPRETATION: Left Ventricle: Left ventricular systolic function is mildly decreased, with an estimated ejection fraction of 40-45%. There is global hypokinesis of the left ventricle with minor regional variations. The left ventricular cavity size is upper limits of normal. The left ventricular septal wall thickness is mildly increased. There is mildly increased left ventricular posterior wall thickness. There is mild  concentric left ventricular hypertrophy. Spectral Doppler shows a normal pattern of left ventricular diastolic filling. Left Atrium: The left atrium is mildly dilated. Right Ventricle: The right ventricle is upper limits of normal in size. There is low normal right ventricular systolic function. Right Atrium: The right atrium is upper limits of normal in size. Aortic Valve: The aortic valve is trileaflet. There is mild aortic valve thickening. There is no evidence of aortic valve stenosis. There is no evidence of aortic valve regurgitation. The peak instantaneous gradient of the aortic valve is 7.7 mmHg. Mitral Valve: The mitral valve is mildly thickened. There is mild to moderate mitral valve regurgitation which is centrally directed. Tricuspid Valve: The tricuspid valve is structurally normal. There is mild tricuspid regurgitation. The Doppler estimated RVSP is mild to moderately elevated at 49.5 mmHg. Pulmonic Valve: The pulmonic valve is structurally normal. There is trace to mild pulmonic valve regurgitation. Pericardium: There is no pericardial effusion noted. Aorta: The aortic root is normal. There is no dilatation of the ascending aorta. There is no dilatation of the aortic root. Pulmonary Artery: The main pulmonary artery is normal in size, and position, with normal bifurcation into the left and right pulmonary arteries. Systemic Veins: The inferior vena cava appears dilated. The hepatic vein appears to be of normal size. There is less than 50% IVC collapse with inspiration.  CONCLUSIONS:  1. Left ventricular systolic function is mildly decreased with a 40-45% estimated ejection fraction.  2. There is low normal right ventricular systolic function.  3. Mild to moderate mitral valve regurgitation.  4. Mild to moderately elevated right ventricular systolic pressure.  5. Aortic valve stenosis is not present.  6. There is global hypokinesis of the left ventricle with minor regional variations. QUANTITATIVE DATA  SUMMARY: 2D MEASUREMENTS:                           Normal Ranges: LAs:           3.40 cm    (2.7-4.0cm) IVSd:          1.27 cm    (0.6-1.1cm) LVPWd:         1.40 cm    (0.6-1.1cm) LVIDd:         4.90 cm    (3.9-5.9cm) LVIDs:         3.90 cm LV Mass Index: 131.0 g/m2 LV % FS        20.4 % LA VOLUME:                             Normal Ranges: LA Area A4C:     24.0 cm2 LA Area A2C:     22.0 cm2 LA Volume Index: 39.0 ml/m2 M-MODE MEASUREMENTS:                  Normal Ranges: Ao Root: 2.80 cm (2.0-3.7cm) AoV Exc: 1.80 cm (1.5-2.5cm) AORTA MEASUREMENTS:                    Normal Ranges: AoV Exc:   1.80 cm (1.5-2.5cm) Asc Ao, d: 3.10 cm (2.1-3.4cm) LV SYSTOLIC FUNCTION BY 2D PLANIMETRY (MOD):                     Normal Ranges: EF-A4C View: 42.2 % (>=55%) EF-A2C View: 41.0 % EF-Biplane:  43.1 % LV DIASTOLIC FUNCTION:                               Normal Ranges: MV e'             0.08 m/s    (>8.0) MV lateral e'     0.09 m/s MV medial e'      0.07 m/s PulmV Sys Polo:    53.10 cm/s PulmV Echavarria Polo:   48.80 cm/s PulmV S/D Polo:    1.10 PulmV A Revs Dur: 208.00 msec MITRAL INSUFFICIENCY:                           Normal Ranges: PISA Radius:  0.3 cm MR VTI:       237.00 cm MR Vmax:      567.67 cm/s MR Alias Polo: 84.7 cm/s MR Volume:    20.00 ml MR Flow Rt:   47.90 ml/s MR EROA:      0.08 cm2 AORTIC VALVE:                         Normal Ranges: AoV Vmax:      1.39 m/s (<=1.7m/s) AoV Peak P.7 mmHg (<20mmHg) LVOT Max Polo:  0.77 m/s (<=1.1m/s) LVOT Diameter: 2.10 cm  (1.8-2.4cm) AoV Area,Vmax: 1.92 cm2 (2.5-4.5cm2)  RIGHT VENTRICLE: RV Basal 4.50 cm RV Mid   2.60 cm RV Major 8.9 cm TAPSE:   15.1 mm RV s'    0.07 m/s TRICUSPID VALVE/RVSP:                             Normal Ranges: Peak TR Velocity: 3.41 m/s RV Syst Pressure: 49.5 mmHg (< 30mmHg) PULMONIC VALVE:                        Normal Ranges: PV Accel Time: 90 msec (>120ms) Pulmonary Veins: PulmV A Revs Dur: 208.00 msec PulmV Echavarria Polo:   48.80 cm/s PulmV S/D Polo:    1.10  PulmV Sys Polo:    53.10 cm/s  [id number] Claudio Gomez MD Electronically signed on 3/11/2024 at 11:12:36 AM  ** Final (Updated) **        Assessment/Plan   Principal Problem:    Hypothermia due to non-environmental cause  Active Problems:    Lupus (CMS/HCC)    Hypoglycemia    Adrenal crisis, likely 2/2 chronic steroid use and immunotherapy, resolved  -No hypotension ON and BS stable  -Continue prednisone 20 mg  -Endocrine consulted: Recommending to wean steroids as tolerable  -Blood cultures and urine cultures (3/9) NTD  -COVID-19 and flu negative  -dsDNA less than 1  -PT/OT pending for dispo planning    Visual Hallucinations  -Resume Risperdal 0.5mg at bedtime     IDDM 2  History of hypoglycemia  -Hypoglycemia protocol--endocrinology recommending C-peptide and beta hydroxybutyrate prior to giving hypoglycemia protocol  -BS stable --am 87  -Insulin sliding scale     Atrial fibrillation on Eliquis  -Continue home Eliquis 2.5 mg twice daily  -Electrolytes optimized     HFrEF  Lower extremity swelling with exertional dyspnea   -TTE 3/11/2024: LVEF 40 to 45% and low normal RV systolic function.  Mild/moderate MR.  Moderately elevated RVSP of 49.5, global hypokinesis of left ventricle with minor regional variations  -Last TTE 3/25/2023 demonstrating LVEF of 55 to 60% with moderately dilated LA  -Indications for GDMT, though difficult given: allergy to lisinopril and mild evidence of JED currently. Do not feel BB is safe given she presented bradycardic in the 40's. Jardiance considered, though not felt safe given hypoglycemic episodes.  -Recommended to follow up with her cardiologist, Dr. Arenas, in outpatient setting for GDMT management     Mild JED  -Cr mildly up-trended to 1.92 from 1.89  -TTE demonstrating dilated IVC, possibly owing to ?cardiorenal syndrome  -Challenged with 20 mg Iv lasix yesterday  -24hr UO 3.1L, net neg 1.3L today. Overall net -2.1L since admission  -Holding off on further diuresis today     Acute  blood loss anemia, resolved  -Hgb 6.8 --> 8.9 -->8.3 s/p 1U PRBC yesterday  -No active s/s active bleeding    History of SLE cerebritis  Andrew arthropathy  - Continue home mycophenolate Moffa 200 mg twice daily  -Anti-dsDNA < 1, less likely SLE flare     History of seizures  - Continue home Keppra 500 mg twice daily     COPD not in exacerbation  - DuoNebs every 6 hours  - DuoNebs every 4 hours as needed for shortness of breath     Consults: endocrine   DVT Ppx: eliquis   Diet: diabetic   CODE STATUS: FULL       Disposition: Admitted as adrenal crisis requiring hydrocortisone and endocrine consulted. HDS, afebrile, and sugars stable. Continuing with prednisone 20mg. VS/HDS. Pending PT/OT evaluation    Case discussed with attending physician,    Yassine Win, DO  PGY-2 Internal Medicine

## 2024-03-12 NOTE — DOCUMENTATION CLARIFICATION NOTE
"    PATIENT:               LISBET GUTIERRES  ACCT #:                  [id number]  MRN:                       [id number]  :                       1960  ADMIT DATE:       3/9/2024 7:20 AM  DISCH DATE:  RESPONDING PROVIDER #:        [id number]          PROVIDER RESPONSE TEXT:    Metabolic Encephalopathy 2/2 hypoglycemia/hypothermia    CDI QUERY TEXT:    [facility]_Encephalopathy Type    Instruction:    Based on your assessment of the patient and the clinical information, please provide the requested documentation by clicking on the appropriate radio button and enter any additional information if prompted.    Question: Please further clarify the type of Encephalopathy as    When answering this query, please exercise your independent professional judgment. The fact that a question is being asked, does not imply that any particular answer is desired or expected.    The patient's clinical indicators include:  Clinical Information:  63F presents for hypothermia/hypoglycemia, found to have adrenal insufficiency    Clinical Indicators:  - ED, 3/9, Alise: \"head injury on anticoagulation limited trauma.  Patient was found in bed with a low glucose...Patient is obtunded on arrival...hypothermia...Patient became more awake and alert as her sugar came up\"  - , 3/9, Pagad: \"Admitted for suspected renal insufficiency with hypothermia...Suspected adrenal insufficiency; Hypothermia...patient lethargic but responsive and able to answer questions.\"  - Med, 3/10, Josh: \"admitted yesterday with fall and encephalopathy in the setting of bradycardia/hypothermia/hypoglycemia\"    Treatment:  D10, D50, frequent glucose checks, hydrocortisone IV, Endocrine consult    Risk Factors:  SLE, adrenal insufficiency, DM2  Options provided:  -- Metabolic Encephalopathy 2/2 hypoglycemia/hypothermia  -- Other - I will add my own diagnosis  -- Refer to Clinical Documentation Reviewer    Query created by: Anthony Jaime on 3/11/2024 2:36 " PM      Electronically signed by:  YULIANA BARRETO MD 3/12/2024 5:25 PM

## 2024-03-13 LAB
ADENOVIRUS RVP, VIRC: NOT DETECTED
ALBUMIN SERPL BCP-MCNC: 3.3 G/DL (ref 3.4–5)
ANION GAP SERPL CALC-SCNC: 14 MMOL/L (ref 10–20)
BACTERIA BLD CULT: NORMAL
BACTERIA BLD CULT: NORMAL
BUN SERPL-MCNC: 36 MG/DL (ref 6–23)
CALCIUM SERPL-MCNC: 8.5 MG/DL (ref 8.6–10.3)
CHLORIDE SERPL-SCNC: 108 MMOL/L (ref 98–107)
CO2 SERPL-SCNC: 26 MMOL/L (ref 21–32)
CREAT SERPL-MCNC: 1.56 MG/DL (ref 0.5–1.05)
EGFRCR SERPLBLD CKD-EPI 2021: 37 ML/MIN/1.73M*2
ENTEROVIRUS/RHINOVIRUS RVP, VIRC: NOT DETECTED
ERYTHROCYTE [DISTWIDTH] IN BLOOD BY AUTOMATED COUNT: 20.6 % (ref 11.5–14.5)
GLUCOSE BLD MANUAL STRIP-MCNC: 132 MG/DL (ref 74–99)
GLUCOSE BLD MANUAL STRIP-MCNC: 153 MG/DL (ref 74–99)
GLUCOSE BLD MANUAL STRIP-MCNC: 213 MG/DL (ref 74–99)
GLUCOSE BLD MANUAL STRIP-MCNC: 335 MG/DL (ref 74–99)
GLUCOSE SERPL-MCNC: 176 MG/DL (ref 74–99)
HCT VFR BLD AUTO: 31.5 % (ref 36–46)
HGB BLD-MCNC: 9.2 G/DL (ref 12–16)
HUMAN BOCAVIRUS RVP, VIRC: NOT DETECTED
HUMAN CORONAVIRUS RVP, VIRC: NOT DETECTED
INFLUENZA A , VIRC: NOT DETECTED
INFLUENZA A H1N1-09 , VIRC: NOT DETECTED
INFLUENZA B PCR, VIRC: NOT DETECTED
MAGNESIUM SERPL-MCNC: 1.82 MG/DL (ref 1.6–2.4)
MCHC RBC AUTO-ENTMCNC: 29.2 G/DL (ref 32–36)
MCV RBC AUTO: 101 FL (ref 80–100)
METAPNEUMOVIRUS , VIRC: NOT DETECTED
NRBC BLD-RTO: 0.5 /100 WBCS (ref 0–0)
PARAINFLUENZA PCR, VIRC: NOT DETECTED
PHOSPHATE SERPL-MCNC: 3 MG/DL (ref 2.5–4.9)
PLATELET # BLD AUTO: 79 X10*3/UL (ref 150–450)
POTASSIUM SERPL-SCNC: 4.4 MMOL/L (ref 3.5–5.3)
RBC # BLD AUTO: 3.12 X10*6/UL (ref 4–5.2)
RSV PCR, RVP, VIRC: NOT DETECTED
SODIUM SERPL-SCNC: 144 MMOL/L (ref 136–145)
WBC # BLD AUTO: 5.9 X10*3/UL (ref 4.4–11.3)

## 2024-03-13 PROCEDURE — 2500000002 HC RX 250 W HCPCS SELF ADMINISTERED DRUGS (ALT 637 FOR MEDICARE OP, ALT 636 FOR OP/ED): Performed by: STUDENT IN AN ORGANIZED HEALTH CARE EDUCATION/TRAINING PROGRAM

## 2024-03-13 PROCEDURE — 2500000004 HC RX 250 GENERAL PHARMACY W/ HCPCS (ALT 636 FOR OP/ED): Performed by: STUDENT IN AN ORGANIZED HEALTH CARE EDUCATION/TRAINING PROGRAM

## 2024-03-13 PROCEDURE — 83735 ASSAY OF MAGNESIUM: CPT

## 2024-03-13 PROCEDURE — 85027 COMPLETE CBC AUTOMATED: CPT

## 2024-03-13 PROCEDURE — 2500000001 HC RX 250 WO HCPCS SELF ADMINISTERED DRUGS (ALT 637 FOR MEDICARE OP): Performed by: STUDENT IN AN ORGANIZED HEALTH CARE EDUCATION/TRAINING PROGRAM

## 2024-03-13 PROCEDURE — 1200000002 HC GENERAL ROOM WITH TELEMETRY DAILY

## 2024-03-13 PROCEDURE — 82947 ASSAY GLUCOSE BLOOD QUANT: CPT

## 2024-03-13 PROCEDURE — 2500000004 HC RX 250 GENERAL PHARMACY W/ HCPCS (ALT 636 FOR OP/ED)

## 2024-03-13 PROCEDURE — 97165 OT EVAL LOW COMPLEX 30 MIN: CPT | Mod: GO | Performed by: OCCUPATIONAL THERAPIST

## 2024-03-13 PROCEDURE — 2500000001 HC RX 250 WO HCPCS SELF ADMINISTERED DRUGS (ALT 637 FOR MEDICARE OP)

## 2024-03-13 PROCEDURE — 36415 COLL VENOUS BLD VENIPUNCTURE: CPT

## 2024-03-13 PROCEDURE — 99232 SBSQ HOSP IP/OBS MODERATE 35: CPT

## 2024-03-13 PROCEDURE — 80069 RENAL FUNCTION PANEL: CPT

## 2024-03-13 PROCEDURE — 97161 PT EVAL LOW COMPLEX 20 MIN: CPT | Mod: GP

## 2024-03-13 RX ORDER — HYDROCORTISONE 5 MG/1
15 TABLET ORAL NIGHTLY
Status: DISCONTINUED | OUTPATIENT
Start: 2024-03-13 | End: 2024-03-13

## 2024-03-13 RX ORDER — INSULIN LISPRO 100 [IU]/ML
8 INJECTION, SOLUTION INTRAVENOUS; SUBCUTANEOUS ONCE
Status: COMPLETED | OUTPATIENT
Start: 2024-03-13 | End: 2024-03-13

## 2024-03-13 RX ORDER — PREDNISONE 20 MG/1
20 TABLET ORAL DAILY
Status: DISCONTINUED | OUTPATIENT
Start: 2024-03-13 | End: 2024-03-14 | Stop reason: HOSPADM

## 2024-03-13 RX ADMIN — Medication 5 MG: at 20:48

## 2024-03-13 RX ADMIN — AMLODIPINE BESYLATE 2.5 MG: 2.5 TABLET ORAL at 08:39

## 2024-03-13 RX ADMIN — INSULIN LISPRO 8 UNITS: 100 INJECTION, SOLUTION INTRAVENOUS; SUBCUTANEOUS at 22:18

## 2024-03-13 RX ADMIN — RISPERIDONE 0.5 MG: 0.5 TABLET ORAL at 20:48

## 2024-03-13 RX ADMIN — LEVETIRACETAM 500 MG: 500 TABLET, FILM COATED ORAL at 04:47

## 2024-03-13 RX ADMIN — PANTOPRAZOLE SODIUM 40 MG: 40 TABLET, DELAYED RELEASE ORAL at 08:39

## 2024-03-13 RX ADMIN — MYCOPHENOLATE MOFETIL 1000 MG: 250 CAPSULE ORAL at 08:39

## 2024-03-13 RX ADMIN — THIAMINE HCL TAB 100 MG 100 MG: 100 TAB at 08:39

## 2024-03-13 RX ADMIN — NYSTATIN 1 APPLICATION: 100000 POWDER TOPICAL at 20:51

## 2024-03-13 RX ADMIN — ATORVASTATIN CALCIUM 40 MG: 40 TABLET, FILM COATED ORAL at 20:48

## 2024-03-13 RX ADMIN — MYCOPHENOLATE MOFETIL 1000 MG: 250 CAPSULE ORAL at 20:48

## 2024-03-13 RX ADMIN — LEVETIRACETAM 500 MG: 500 TABLET, FILM COATED ORAL at 17:35

## 2024-03-13 RX ADMIN — FOLIC ACID 1 MG: 1 TABLET ORAL at 08:39

## 2024-03-13 RX ADMIN — INSULIN LISPRO 2 UNITS: 100 INJECTION, SOLUTION INTRAVENOUS; SUBCUTANEOUS at 17:35

## 2024-03-13 RX ADMIN — NYSTATIN 1 APPLICATION: 100000 POWDER TOPICAL at 09:59

## 2024-03-13 RX ADMIN — APIXABAN 2.5 MG: 2.5 TABLET, FILM COATED ORAL at 08:38

## 2024-03-13 RX ADMIN — INSULIN LISPRO 2 UNITS: 100 INJECTION, SOLUTION INTRAVENOUS; SUBCUTANEOUS at 11:54

## 2024-03-13 RX ADMIN — PREDNISONE 20 MG: 20 TABLET ORAL at 08:39

## 2024-03-13 RX ADMIN — APIXABAN 2.5 MG: 2.5 TABLET, FILM COATED ORAL at 20:48

## 2024-03-13 ASSESSMENT — COGNITIVE AND FUNCTIONAL STATUS - GENERAL
DRESSING REGULAR UPPER BODY CLOTHING: A LOT
HELP NEEDED FOR BATHING: A LOT
STANDING UP FROM CHAIR USING ARMS: A LOT
DAILY ACTIVITIY SCORE: 13
MOVING TO AND FROM BED TO CHAIR: A LITTLE
MOVING TO AND FROM BED TO CHAIR: A LOT
MOBILITY SCORE: 11
MOBILITY SCORE: 16
EATING MEALS: A LITTLE
STANDING UP FROM CHAIR USING ARMS: A LITTLE
TURNING FROM BACK TO SIDE WHILE IN FLAT BAD: A LITTLE
DAILY ACTIVITIY SCORE: 13
WALKING IN HOSPITAL ROOM: A LOT
HELP NEEDED FOR BATHING: A LOT
MOBILITY SCORE: 16
WALKING IN HOSPITAL ROOM: A LOT
CLIMB 3 TO 5 STEPS WITH RAILING: A LOT
DRESSING REGULAR LOWER BODY CLOTHING: A LOT
HELP NEEDED FOR BATHING: A LOT
TURNING FROM BACK TO SIDE WHILE IN FLAT BAD: A LOT
DRESSING REGULAR LOWER BODY CLOTHING: A LOT
DAILY ACTIVITIY SCORE: 13
PERSONAL GROOMING: A LOT
PERSONAL GROOMING: A LITTLE
TOILETING: TOTAL
MOVING FROM LYING ON BACK TO SITTING ON SIDE OF FLAT BED WITH BEDRAILS: A LITTLE
MOVING FROM LYING ON BACK TO SITTING ON SIDE OF FLAT BED WITH BEDRAILS: A LOT
EATING MEALS: A LITTLE
EATING MEALS: A LITTLE
DRESSING REGULAR LOWER BODY CLOTHING: A LOT
TOILETING: A LOT
CLIMB 3 TO 5 STEPS WITH RAILING: TOTAL
TOILETING: TOTAL
MOVING TO AND FROM BED TO CHAIR: A LITTLE
PERSONAL GROOMING: A LITTLE
DRESSING REGULAR UPPER BODY CLOTHING: A LOT
STANDING UP FROM CHAIR USING ARMS: A LITTLE
MOVING FROM LYING ON BACK TO SITTING ON SIDE OF FLAT BED WITH BEDRAILS: A LITTLE
WALKING IN HOSPITAL ROOM: A LOT
DRESSING REGULAR UPPER BODY CLOTHING: A LOT
CLIMB 3 TO 5 STEPS WITH RAILING: A LOT
TURNING FROM BACK TO SIDE WHILE IN FLAT BAD: A LITTLE

## 2024-03-13 ASSESSMENT — PAIN SCALES - GENERAL
PAINLEVEL_OUTOF10: 0 - NO PAIN

## 2024-03-13 ASSESSMENT — PAIN - FUNCTIONAL ASSESSMENT: PAIN_FUNCTIONAL_ASSESSMENT: 0-10

## 2024-03-13 ASSESSMENT — ACTIVITIES OF DAILY LIVING (ADL): ADL_ASSISTANCE: NEEDS ASSISTANCE

## 2024-03-13 NOTE — CARE PLAN
The patient's goals for the shift include      The clinical goals for the shift include see care plan      Problem: Diabetes  Goal: Achieve decreasing blood glucose levels by end of shift  3/13/2024 1726 by Francisca Aguila RN  Outcome: Progressing  3/13/2024 1726 by Francisca Aguila RN  Outcome: Progressing  Flowsheets (Taken 3/11/2024 1013 by Helen Leahy RN)  Achieve decreasing blood glucose levels by end of shift: Med administration/monitoring of effect  Goal: Increase stability of blood glucose readings by end of shift  3/13/2024 1726 by Francisca Aguila RN  Outcome: Progressing  3/13/2024 1726 by Francisca Aguila RN  Outcome: Progressing  Flowsheets (Taken 3/12/2024 1704)  Increase stability of blood glucose readings by end of shift: Med administration/monitoring of effect  Goal: Decrease in ketones present in urine by end of shift  3/13/2024 1726 by Francisca Aguila RN  Outcome: Progressing  3/13/2024 1726 by Francisca Aguila RN  Outcome: Progressing  Flowsheets (Taken 3/12/2024 1704)  Decrease in ketones present in urine by end of shift: Med administration/monitoring of effect  Goal: Maintain electrolyte levels within acceptable range throughout shift  3/13/2024 1726 by Francisca Aguila RN  Outcome: Progressing  3/13/2024 1726 by Francisca Aguila RN  Outcome: Progressing  Flowsheets (Taken 3/12/2024 1704)  Maintain electrolyte levels within acceptable range throughout shift: Med administration/monitoring of effect  Goal: Maintain glucose levels >70mg/dl to <250mg/dl throughout shift  3/13/2024 1726 by Francisca Aguila RN  Outcome: Progressing  3/13/2024 1726 by Francisca Aguila RN  Outcome: Progressing  Flowsheets (Taken 3/12/2024 1704)  Maintain glucose levels >70mg/dl to <250mg/dl throughout shift: Med administration/monitoring of effect  Goal: No changes in neurological exam by end of shift  3/13/2024 1726 by Francisca Aguila RN  Outcome: Progressing  3/13/2024 1726 by Francisca Aguila RN  Outcome:  Progressing  Goal: Learn about and adhere to nutrition recommendations by end of shift  3/13/2024 1726 by Francisca Aguila RN  Outcome: Progressing  3/13/2024 1726 by Francisca Aguila RN  Outcome: Progressing  Flowsheets (Taken 3/12/2024 1704)  Learn about and adhere to nutrition recommendations by end of shift: Ensure/encourage compliance with appropriate diet  Goal: Vital signs within normal range for age by end of shift  3/13/2024 1726 by Francisca Aguila RN  Outcome: Progressing  3/13/2024 1726 by Francisca Aguila RN  Outcome: Progressing  Flowsheets (Taken 3/12/2024 1704)  Vital signs within normal range for age by end of shift: Med administration/monitoring of effect  Goal: Increase self care and/or family involovement by end of shift  3/13/2024 1726 by Francisca Aguila RN  Outcome: Progressing  3/13/2024 1726 by Francisca Aguila RN  Outcome: Progressing  Flowsheets (Taken 3/12/2024 1704)  Increase self care and/or family involovement by end of shift: Self monitor blood glucose with staff oversight  Goal: Receive DSME education by end of shift  3/13/2024 1726 by Francisca Aguila RN  Outcome: Progressing  3/13/2024 1726 by Francisca Aguila RN  Outcome: Progressing  Flowsheets (Taken 3/12/2024 1704)  Receive DSME education by end of shift: Provide patient centered education on Diabetic Self Management Education     Problem: Skin  Goal: Decreased wound size/increased tissue granulation at next dressing change  3/13/2024 1726 by Francisca Aguila RN  Outcome: Progressing  3/13/2024 1726 by Francisca Aguila RN  Outcome: Progressing  Flowsheets (Taken 3/13/2024 0514 by Stephie Freedman LPN)  Decreased wound size/increased tissue granulation at next dressing change: Promote sleep for wound healing  Goal: Participates in plan/prevention/treatment measures  3/13/2024 1726 by Francisca Aguila RN  Outcome: Progressing  3/13/2024 1726 by Francisca Aguila RN  Outcome: Progressing  Flowsheets (Taken 3/13/2024 0514 by Stephie Edward  SUSHMA Freedman)  Participates in plan/prevention/treatment measures: Elevate heels  Goal: Prevent/manage excess moisture  3/13/2024 1726 by Francisca Aguila RN  Outcome: Progressing  3/13/2024 1726 by Francisca Aguila RN  Outcome: Progressing  Flowsheets (Taken 3/13/2024 0514 by Stephie Freedman LPN)  Prevent/manage excess moisture: Cleanse incontinence/protect with barrier cream  Goal: Prevent/minimize sheer/friction injuries  3/13/2024 1726 by Francisca Aguila RN  Outcome: Progressing  3/13/2024 1726 by Francisca Aguila RN  Outcome: Progressing  Flowsheets (Taken 3/13/2024 0514 by Stephie Freedman LPN)  Prevent/minimize sheer/friction injuries: Turn/reposition every 2 hours/use positioning/transfer devices  Goal: Promote/optimize nutrition  3/13/2024 1726 by Francisca Aguila RN  Outcome: Progressing  3/13/2024 1726 by Francisca Aguila RN  Outcome: Progressing  Flowsheets (Taken 3/13/2024 0514 by Stephie Freedman LPN)  Promote/optimize nutrition: Consume > 50% meals/supplements  Goal: Promote skin healing  3/13/2024 1726 by Francisca Aguila RN  Outcome: Progressing  3/13/2024 1726 by Francisca Aguila RN  Outcome: Progressing  Flowsheets (Taken 3/13/2024 0514 by Stephie Freedman LPN)  Promote skin healing: Turn/reposition every 2 hours/use positioning/transfer devices     Problem: Pain - Adult  Goal: Verbalizes/displays adequate comfort level or baseline comfort level  3/13/2024 1726 by Francisca Aguila RN  Outcome: Progressing  3/13/2024 1726 by Francisca Aguila RN  Outcome: Progressing  Flowsheets (Taken 3/13/2024 1726)  Verbalizes/displays adequate comfort level or baseline comfort level: Encourage patient to monitor pain and request assistance     Problem: Safety - Adult  Goal: Free from fall injury  3/13/2024 1726 by Francisca Aguila RN  Outcome: Progressing  3/13/2024 1726 by Francisca Aguila RN  Outcome: Progressing  Flowsheets (Taken 3/13/2024 1726)  Free from fall injury: Instruct family/caregiver on  patient safety     Problem: Discharge Planning  Goal: Discharge to home or other facility with appropriate resources  3/13/2024 1726 by Francisca Aguila RN  Outcome: Progressing  3/13/2024 1726 by Francisca Aguila RN  Outcome: Progressing  Flowsheets (Taken 3/13/2024 1726)  Discharge to home or other facility with appropriate resources: Identify barriers to discharge with patient and caregiver     Problem: Chronic Conditions and Co-morbidities  Goal: Patient's chronic conditions and co-morbidity symptoms are monitored and maintained or improved  3/13/2024 1726 by Francisca Aguila RN  Outcome: Progressing  3/13/2024 1726 by Francisca Aguila RN  Outcome: Progressing  Flowsheets (Taken 3/13/2024 1726)  Care Plan - Patient's Chronic Conditions and Co-Morbidity Symptoms are Monitored and Maintained or Improved: Monitor and assess patient's chronic conditions and comorbid symptoms for stability, deterioration, or improvement

## 2024-03-13 NOTE — PROGRESS NOTES
Physical Therapy    Physical Therapy Evaluation    Patient Name: Bing Holliday  MRN: [id number]  Today's Date: 3/13/2024   Time Calculation  Start Time: 1036  Stop Time: 1052  Time Calculation (min): 16 min    Assessment/Plan   PT Assessment  PT Assessment Results: Decreased endurance, Decreased mobility, Decreased coordination  Rehab Prognosis: Good  Evaluation/Treatment Tolerance: Patient tolerated treatment well  Medical Staff Made Aware: Yes  End of Session Communication: Bedside nurse, Care Coordinator  Assessment Comment: expect better performance when shoes are worn.  End of Session Patient Position: Up in chair, Alarm on  IP OR SWING BED PT PLAN  Inpatient or Swing Bed: Inpatient  PT Plan  Treatment/Interventions: Bed mobility, Transfer training, Gait training, Stair training, Strengthening, Endurance training, Therapeutic exercise, Therapeutic activity  PT Plan:  (see till disch'd..  possible SNF on D/C.)  PT Frequency: 5 times per week  PT Discharge Recommendations: Moderate intensity level of continued care  Equipment Recommended upon Discharge: Wheeled walker  PT Recommended Transfer Status: Assist x2  PT - OK to Discharge: Yes (when medically allowed.)    Subjective     Current Problem:  Patient Active Problem List   Diagnosis    COVID-19    Lupus (CMS/HCC)    Ambulatory dysfunction    Myelodysplastic syndrome, unspecified (CMS/HCC)    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HCC)    Hyperlipidemia    Pneumonia of both lower lobes due to infectious organism    Hypoglycemia    Hallucinations    Leg swelling    Hyperglycemia    JED (acute kidney injury) (CMS/[facility])    Hypernatremia    Proliferative diabetic retinopathy of right eye without macular edema determined by examination associated with type 2 diabetes mellitus (CMS/HCC)    Urinary incontinence    Paraparesis (CMS/HCC)    Abdominal cramping    Abnormal echocardiogram    Abnormal gait    Abnormal thyroid blood test    Advanced COPD (CMS/HCC)    Afferent pupillary  defect of right eye    Anemia    Pancytopenia (CMS/HCC)    Altitudinal scotoma of right eye    Arcuate scotoma of left eye    Arthropathy    Asymptomatic PVCs    PAF (paroxysmal atrial fibrillation) (CMS/HCC)    Balance problem    Bilateral high frequency sensorineural hearing loss    Bradycardia    Branch retinal artery occlusion    Cardiomyopathy (CMS/HCC)    Chronic diarrhea    Chronic fatigue    Chronic kidney disease (CKD), stage III (moderate) (CMS/HCC)    CKD stage G3a/A2, GFR 45-59 and albumin creatinine ratio  mg/g (CMS/HCC)    Combined forms of age-related cataract of left eye    Combined forms of age-related cataract of right eye    Contracture of hand    Snoring    CVA (cerebral vascular accident) (CMS/HCC)    Daytime somnolence    Degeneration of lumbar/lumbosacral disc without myelopathy    Depression, major    Dizziness    Dupuytren's contracture    Eczema    Elevated alkaline phosphatase level    Encephalopathy acute    Facial twitching    Fibromyalgia    Fungal nail infection    GERD (gastroesophageal reflux disease)    Gouty arthropathy    H/O iritis    H/O orthostatic hypotension    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HCC)    High level of cardiac marker    Hip hematoma, right    Hypothermia    Insomnia    Leg edema, left    Loss of consciousness (CMS/HCC)    Low blood sugar reading    Lung nodule, multiple    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HCC)    Metabolic encephalopathy    Muscle cramps    Nodule of skin of left lower leg    Obstructive lung disease (CMS/HCC)    Osteoarthritis of left hand    Osteoarthritis of right hand    Osteopenia    Osteoporosis    Palpitations    Peripheral visual field defect    Persistent proteinuria    Positive colorectal cancer screening using Cologuard test    Benign essential HTN    Psychosis (CMS/HCC)    Pulmonary hypertension (CMS/HCC)    Refractive error    Hypertensive retinopathy    Retinal neovascularization    Secondary pulmonary arterial hypertension (CMS/HCC)     Shortness of breath on exertion    Spinal stenosis of lumbar region with neurogenic claudication    Subjective tinnitus of both ears    Swelling of right foot    Syncope and collapse    Thrombophlebitis of superficial veins of left lower extremity    Tinnitus of left ear    Vitamin D deficiency    DM type 2 with diabetic peripheral neuropathy (CMS/HCC)    Systemic lupus erythematosus (CMS/[facility])    Diabetic nephropathy (CMS/HCC)    Functional diarrhea    UTI (urinary tract infection)    Moderate protein-calorie malnutrition (CMS/[facility])    Shock, unspecified (CMS/[facility])    Hyperkalemia    Adrenal insufficiency (CMS/[facility])    Seizure-like activity (CMS/[facility])    Meningitis    Encephalopathy    Seizure (CMS/[facility])    Uncontrolled blood glucose    Cervical spondylosis with myelopathy    COPD (chronic obstructive pulmonary disease) (CMS/[facility])    Encounter for diabetes education    Rheumatoid arthritis involving both hands with positive rheumatoid factor (CMS/[facility])    Chronic kidney disease, stage 4 (severe) (CMS/[facility])    Ulcerative (chronic) pancolitis with rectal bleeding (CMS/[facility])    Hypothermia due to non-environmental cause    Anxiety    Cortical cataract    Hyperparathyroidism due to renal insufficiency (CMS/[facility])    Iron deficiency anemia due to chronic blood loss    Left ventricular hypertrophy    Nephrosclerosis    Obesity    Ocular migraine    Persistent cough    Proliferative diabetic retinopathy (CMS/[facility])    Raynaud's disease       General Visit Information:  General  Reason for Referral: Hypothermia /  Impaired mobility, cognition, and safety awareness.  Referred By: Yolanda Moreno MD  Past Medical History Relevant to Rehab: previous admits 2/14/24 for hyperK+; 1/17/24 for Lupus; 1/14/24 for Adrenal Insuff..  L foot injury.  Co-Treatment: OT  Prior to Session Communication: Bedside nurse  Patient Position Received: Bed, 3 rail up, Alarm on  Preferred Learning Style: verbal, visual  General Comment: Has a pure-wick.   Requests only to walk with shoes on.  Family to bring in by tomorrow. (Hosp. course was ICU to [facility].)    Home Living:  Home Living  Type of Home: House  Lives With: Adult children (son and his family.)  Home Adaptive Equipment: Walker rolling or standard  Home Layout: Two level  Bathroom Shower/Tub: Walk-in shower  Home Living Comments: stays on 1st floor.  Family assists up 14 or 10 stairs with railing, for her to take a shower.    Prior Level of Function:  Prior Function Per Pt/Caregiver Report  Level of [place]: Independent with homemaking with ambulation  Ambulatory Assistance: Independent (with 2 w/w amb. indoors.)  Prior Function Comments: Just home from [facility] x 1-2 days..    Precautions:  Precautions  LE Weight Bearing Status: Weight Bearing as Tolerated  Medical Precautions: Fall precautions, Seizure precautions  Precautions Comment: rolled off her bed at home.    Vital Signs:     Objective     Pain:  Pain Assessment  Pain Score: 0 - No pain    Cognition:  Cognition  Overall Cognitive Status: Within Functional Limits  Orientation Level: Oriented X4    General Assessments:  General Observation  General Observation: b/l LE edema.   Activity Tolerance  Endurance: Decreased tolerance for upright activites  Sensation  Light Touch: No apparent deficits  Strength  Strength Comments: WFL=b/l Quads..     Coordination  Movements are Fluid and Coordinated: No  Coordination Comment: expect coordination to improve when shoes are worn.  Postural Control  Postural Control: Within Functional Limits          Functional Assessments:     Bed Mobility  Bed Mobility: Yes  Bed Mobility 1  Bed Mobility 1: Supine to sitting, Sitting to supine  Level of Assistance 1: Close supervision  Transfers  Transfer: Yes  Transfer 1  Transfer From 1: Sit to, Stand to  Transfer Device 1: Walker  Transfer Level of Assistance 1: Minimum assistance, Contact guard (x 1-2)  Ambulation/Gait Training  Ambulation/Gait Training  Performed: Yes  Ambulation/Gait Training 1  Surface 1: Level tile  Device 1: Rolling walker  Gait Support Devices: Gait belt  Assistance 1: Minimum assistance (x 1-2.)  Comments/Distance (ft) 1: x 2-3 ft. to chair. short shuffling sidesteps.  Stairs  Stairs: No       Extremity/Trunk Assessments:                Outcome Measures:  [facility] Basic Mobility  Turning from your back to your side while in a flat bed without using bedrails: A little  Moving from lying on your back to sitting on the side of a flat bed without using bedrails: A little  Moving to and from bed to chair (including a wheelchair): A little  Standing up from a chair using your arms (e.g. wheelchair or bedside chair): A little  To walk in hospital room: A lot  Climbing 3-5 steps with railing: A lot  Basic Mobility - Total Score: 16                            Goals:  Encounter Problems       Encounter Problems (Active)       Mobility       STG - Patient will ambulate x 30-40 ft.. with w/w, CGA.         Start:  03/13/24    Expected End:  03/27/24            STG - Patient will ascend and descend four to six stairs with rail and a cane.         Start:  03/13/24    Expected End:  03/27/24               PT Transfers       STG - Patient will transfer sit to and from stand into a w/w, CGA.         Start:  03/13/24    Expected End:  03/27/24               Pain - Adult          Safety       STG - Patient uses gait belt during all transfers and w/w amb. trng..         Start:  03/13/24    Expected End:  03/27/24                 Education Documentation  Precautions, taught by Hay Lopez, PT at 3/13/2024  1:36 PM.  Learner: Patient  Readiness: Acceptance  Method: Demonstration  Response: Demonstrated Understanding, Needs Reinforcement    ADL Training, taught by Hay Lopez, PT at 3/13/2024  1:36 PM.  Learner: Patient  Readiness: Acceptance  Method: Demonstration  Response: Demonstrated Understanding, Needs Reinforcement    Home Exercise Program, taught  by Hay Lopez PT at 3/13/2024  1:36 PM.  Learner: Patient  Readiness: Acceptance  Method: Demonstration  Response: Demonstrated Understanding, Needs Reinforcement    Precautions, taught by Hay Lopez PT at 3/13/2024  1:36 PM.  Learner: Patient  Readiness: Acceptance  Method: Demonstration  Response: Demonstrated Understanding, Needs Reinforcement    Body Mechanics, taught by Hay Lopez, PT at 3/13/2024  1:36 PM.  Learner: Patient  Readiness: Acceptance  Method: Demonstration  Response: Demonstrated Understanding, Needs Reinforcement    Mobility Training, taught by Hay Lopez, PT at 3/13/2024  1:36 PM.  Learner: Patient  Readiness: Acceptance  Method: Demonstration  Response: Demonstrated Understanding, Needs Reinforcement    Precautions, taught by Hay Lopez PT at 3/13/2024  1:36 PM.  Learner: Patient  Readiness: Acceptance  Method: Demonstration, Explanation  Response: Demonstrated Understanding, Needs Reinforcement    ADL Training, taught by Hay Lopez PT at 3/13/2024  1:36 PM.  Learner: Patient  Readiness: Acceptance  Method: Demonstration, Explanation  Response: Demonstrated Understanding, Needs Reinforcement    Home Exercise Program, taught by Hay Lopez PT at 3/13/2024  1:36 PM.  Learner: Patient  Readiness: Acceptance  Method: Demonstration, Explanation  Response: Demonstrated Understanding, Needs Reinforcement    Precautions, taught by Hay Lopez PT at 3/13/2024  1:36 PM.  Learner: Patient  Readiness: Acceptance  Method: Demonstration, Explanation  Response: Demonstrated Understanding, Needs Reinforcement    Body Mechanics, taught by Hay Lopez PT at 3/13/2024  1:36 PM.  Learner: Patient  Readiness: Acceptance  Method: Demonstration, Explanation  Response: Demonstrated Understanding, Needs Reinforcement    Mobility Training, taught by Hay Lopez PT at 3/13/2024  1:36 PM.  Learner: Patient  Readiness: Acceptance  Method:  Demonstration, Explanation  Response: Demonstrated Understanding, Needs Reinforcement    Education Comments  No comments found.

## 2024-03-13 NOTE — CARE PLAN
Pt had no acute changes noted this shift. Pt was able to rest some this shift. Pt seizures pad in place. Pt BS was stable this shift. Pt had no c/o pain. Pt was able to tolerate PO meds well. Pt safety been maintained.

## 2024-03-13 NOTE — PROGRESS NOTES
03/13/24 1139   Discharge Planning   Home or Post Acute Services Post acute facilities (Rehab/SNF/etc);In home services   Type of Post Acute Facility Services Skilled nursing   Patient expects to be discharged to: Home with Residence [facility] vs [facility]   Patient Choice   Patient / Family choosing to utilize agency / facility established prior to hospitalization Yes     Updated by therapy team that recommendation is SNF. Met with patient at bedside. Patient has been to [facility] in the past and that is her FOC. Pt is concerned that she may not have any SNF days left though. Referral sent to [place] to ask- awaiting reply. If patient does not have any SNF days left, plan remains for her to return home and resume Residence [facility]. Will need outgoing [facility] referral and will need to send final orders.

## 2024-03-13 NOTE — PROGRESS NOTES
"Bing Holliday is a 63 y.o. female on day 4 of admission presenting with Hypothermia due to non-environmental cause.    Subjective   No acute events overnight.    Patient seen and examined this morning.  States that she slept well last night and is no longer endorsing visual or auditory hallucinations.  She did receive her Risperdal last night.  She overall feels well and has breakfast at bedside.  No new or worsening symptoms.    Objective     Physical Exam  Constitutional:       Appearance: Normal appearance.  ANO x 4  HENT:      Head: Normocephalic.   Cardiovascular:      Rate and Rhythm: Normal rate and regular rhythm.      Heart sounds: Murmur heard with prominent S2.   Pulmonary:      Effort: No respiratory distress.      Breath sounds: Normal breath sounds. No wheezing, rhonchi or rales.   Abdominal:      Palpations: Abdomen is soft.      Tenderness: There is no abdominal tenderness. There is no guarding.   Musculoskeletal:         General: No swelling or tenderness.      Right lower le+ nonpitting edema present.      Left lower le+ nonpitting edema present.   Skin:     General: Skin is dry.      Capillary Refill: Capillary refill takes less than 2 seconds.   Neurological:      General: No focal deficit present.      Mental Status: She is alert and oriented to person, place, and time.   Psychiatric:         Mood and Affect: Mood normal  Last Recorded Vitals  Blood pressure 141/80, pulse 51, temperature 35.2 °C (95.4 °F), temperature source Temporal, resp. rate 16, height 1.676 m (5' 6\"), weight 96.5 kg (212 lb 11.2 oz), SpO2 99 %.  Intake/Output last 3 Shifts:  I/O last 3 completed shifts:  In: 890 (9.2 mL/kg) [P.O.:840; I.V.:50 (0.5 mL/kg)]  Out: 3850 (39.9 mL/kg) [Urine:3850 (1.1 mL/kg/hr)]  Weight: 96.5 kg     Relevant Results  Scheduled medications  amLODIPine, 2.5 mg, oral, Daily  apixaban, 2.5 mg, oral, BID  atorvastatin, 40 mg, oral, Daily  folic acid, 1 mg, oral, Daily  insulin lispro, 0-10 " Units, subcutaneous, TID with meals  levETIRAcetam, 500 mg, oral, q12h  melatonin, 5 mg, oral, Nightly  mycophenolate, 1,000 mg, oral, BID  nystatin, 1 Application, Topical, BID  pantoprazole, 40 mg, oral, Daily  perflutren lipid microspheres, 0.5-10 mL of dilution, intravenous, Once in imaging  perflutren protein A microsphere, 0.5 mL, intravenous, Once in imaging  [Held by provider] predniSONE, 20 mg, oral, Daily  risperiDONE, 0.5 mg, oral, Nightly  [Held by provider] sodium zirconium cyclosilicate, 10 g, oral, Daily  sulfur hexafluoride microsphr, 2 mL, intravenous, Once in imaging  thiamine, 100 mg, oral, Daily      Continuous medications     PRN medications  PRN medications: budesonide, dextrose 10 % in water (D10W), dextrose, formoterol, glucagon, ipratropium, ipratropium-albuteroL  Results from last 7 days   Lab Units 03/13/24  0652 03/12/24  0617 03/11/24 2021 03/11/24  0622   WBC AUTO x10*3/uL 5.9 4.6  --  5.2   RBC AUTO x10*6/uL 3.12* 2.80*  --  2.30*   HEMOGLOBIN g/dL 9.2* 8.3* 8.9* 6.8*     Results from last 7 days   Lab Units 03/13/24  0652 03/12/24  0618 03/11/24  0622 03/10/24  0351 03/09/24  0739   SODIUM mmol/L 144 145 143 144 142   POTASSIUM mmol/L 4.4 3.6 4.3 5.2 4.4   CHLORIDE mmol/L 108* 111* 113* 113* 110*   CO2 mmol/L 26 25 23 22 23   BUN mg/dL 36* 35* 32* 34* 39*   CREATININE mg/dL 1.56* 1.94* 1.89* 1.68* 1.56*   CALCIUM mg/dL 8.5* 8.3* 8.3* 8.5* 8.6   PHOSPHORUS mg/dL 3.0 2.8  --   --   --    MAGNESIUM mg/dL 1.82 1.47*  --   --   --    BILIRUBIN TOTAL mg/dL  --   --  0.4 0.4 0.4   ALT U/L  --   --  20 23 28   AST U/L  --   --  20 25 29       No results found.     Assessment/Plan   Principal Problem:    Hypothermia due to non-environmental cause  Active Problems:    Lupus (CMS/HCC)    Hypoglycemia    Adrenal crisis, likely 2/2 chronic steroid use and immunotherapy, resolving  Hypothermia  Bradycardia  -Continue prednisone 20 mg  -Progressively developing hypothermia with a.m. temperature on  rounds 95.2, repeat 95.4 midday.  Developing bradycardia, at midnight 48, midday 51.  Possible concern that recent steroid taper not adequate. Endocrinology following with possible consideration for changing over to glucocorticoid with mineralocorticoid activity such as hydrocortisone. Possible orthostasis diagnosis as well, which mineralocorticoid agonist would be useful for.  Will continue to watch patient overnight as it relates to her vital signs.  -Blood cultures and urine cultures (3/9) NTD  -COVID-19 and flu negative  -dsDNA less than 1  -PT/OT recommending SNF placement--patient agreeable and deciding location/pending pre-certification     Visual Hallucinations, resolved  -Resume Risperdal 0.5mg at bedtime      IDDM 2  History of hypoglycemia  -Hypoglycemia protocol--endocrinology recommending C-peptide and beta hydroxybutyrate prior to giving hypoglycemia protocol  -BS stable --no hypoglycemic events in the last 72 hours  -Insulin sliding scale     Atrial fibrillation on Eliquis  -Continue home Eliquis 2.5 mg twice daily  -Electrolytes optimized     HFrEF  Lower extremity swelling with exertional dyspnea   -TTE 3/11/2024: LVEF 40 to 45% and low normal RV systolic function.  Mild/moderate MR.  Moderately elevated RVSP of 49.5, global hypokinesis of left ventricle with minor regional variations  -Last TTE 3/25/2023 demonstrating LVEF of 55 to 60% with moderately dilated LA  -Indications for GDMT, though difficult given: allergy to lisinopril and mild evidence of JED currently. Do not feel BB is safe given she presented bradycardic in the 40's. Jardiance considered, though not felt safe given hypoglycemic episodes.  -Recommended to follow up with her cardiologist, Dr. Arenas, in outpatient setting for GDMT management     Mild JED, resolved  -Cr improved today 1.56  -TTE demonstrating dilated IVC, possibly owing to ?cardiorenal syndrome  -24hr UO 1.5L, net neg 1.1L today       Acute blood loss anemia,  resolved  -Hgb stable at 9.2  -No active s/s active bleeding     History of SLE cerebritis  Andrew arthropathy  - Continue home mycophenolate Moffa 200 mg twice daily  -Anti-dsDNA < 1, less likely SLE flare     History of seizures  - Continue home Keppra 500 mg twice daily     COPD not in exacerbation  - DuoNebs every 6 hours  - DuoNebs every 4 hours as needed for shortness of breath      Case discussed with attending physician,    Yassine Win,   PGY-2 Internal Medicine

## 2024-03-13 NOTE — NURSING NOTE
Eos note: patient remained oriented x4 this shift. No complaints of pain. Worked with PT/OT today, 1-2 assist with the walker, able to sit up in chair for majority of day. Continued to check sugars ACHS, insulin coverage as needed. Seizure pads in place. Sinus angelica on telemetry. No reports of hallucinations today, patient able to rest overnight and through the day. Temperature low, but within range for her normal readings. Medical team is transitioning her steroids to prednisone and continue monitoring based on hypothermia and bradycardia. Recommendation from therapy is SNF    Pt resting at this time. Bed is in lowest position and locked with alarm on. Call light and personal possesions are within reach.

## 2024-03-13 NOTE — PROGRESS NOTES
Occupational Therapy  Evaluation    Patient Name: Bing Holliday  MRN: [id number]  Today's Date: 3/13/2024  Time Calculation  Start Time: 1038  Stop Time: 1047  Time Calculation (min): 9 min    Assessment  IP OT Assessment  OT Assessment: Patient demonstrates decreased independence with self care, decresased independence with functional transfers, decreased balance, decreased strength and decreased endurance.  Patient will benefit from skilled OT to address deficits. Patient would benefit from moderate intensity therapy post hospital stay.  Prognosis: Good  Evaluation/Treatment Tolerance: Patient tolerated treatment well  Medical Staff Made Aware: Yes  End of Session Communication: Bedside nurse  End of Session Patient Position: Up in chair, Alarm on    Plan:  Treatment Interventions: ADL retraining, Functional transfer training, UE strengthening/ROM, Endurance training, Patient/family training  OT Frequency: 3 times per week  OT Discharge Recommendations: Moderate intensity level of continued care  Equipment Recommended upon Discharge: Wheeled walker  OT Recommended Transfer Status: Minimal assist  OT - OK to Discharge: Yes (to next level of care when medically cleared by physician)    Subjective     Current Problem:  1. Swelling of right foot  Transthoracic Echo (TTE) Complete    Transthoracic Echo (TTE) Complete      2. Hypothermia due to non-environmental cause        3. Hypoglycemia        4. Seizure (CMS/HCC)        5. Hypertensive heart disease with congestive heart failure, unspecified heart failure type (CMS/HCC)  Transthoracic Echo (TTE) Complete    Transthoracic Echo (TTE) Complete      6. Unspecified atrial fibrillation (CMS/HCC)  Transthoracic Echo (TTE) Complete          General:  General  Reason for Referral: impaired self care  Referred By: Dr. Moreno  Past Medical History Relevant to Rehab: lupus, hypoglycemia, DM, CVA, CKD  Family/Caregiver Present: No  Co-Treatment: PT  Co-Treatment Reason:  discharge planning  Prior to Session Communication: Bedside nurse  Patient Position Received:  (sitting edge of bed with PT)  General Comment: Patient agreeable to therapy.    Precautions:  Medical Precautions: Fall precautions      Pain:  Pain Assessment  Pain Assessment: 0-10  Pain Score: 0 - No pain    Objective     Cognition:  Orientation Level: Oriented X4        Home Living:  Type of Home: House  Lives With: Adult children  Home Layout: Two level  Bathroom Shower/Tub: Walk-in shower  Home Living Comments: Patient lives with family who provide assist as needed.  Bedroom/bathroom upstairs     Prior Function:  ADL Assistance: Needs assistance  Homemaking Assistance: Needs assistance  Prior Function Comments: uses rollator at home for mobility    IADL History:       ADL:  LE Dressing Assistance: Maximal  Toileting Assistance with Device: Total    Activity Tolerance:  Endurance: Tolerates 10 - 20 min exercise with multiple rests    Bed Mobility/Transfers:   Bed Mobility  Bed Mobility: Yes  Bed Mobility 1  Bed Mobility 1: Supine to sitting  Level of Assistance 1: Close supervision  Transfers  Transfer: Yes (sit to stand, stand to sit, transfer to chair with  min A with FWW)      Outcome Measures: [facility] Daily Activity  Putting on and taking off regular lower body clothing: A lot  Bathing (including washing, rinsing, drying): A lot  Putting on and taking off regular upper body clothing: A lot  Toileting, which includes using toilet, bedpan or urinal: Total  Taking care of personal grooming such as brushing teeth: A little  Eating Meals: A little  Daily Activity - Total Score: 13              EDUCATION:  Education  Individual(s) Educated: Patient  Education Provided: Fall precautons, Risk and benefits of OT discussed with patient or other  Plan of Care Discussed and Agreed Upon: yes  Patient Response to Education: Patient/Caregiver Verbalized Understanding of Information    Goals:   Encounter Problems       Encounter  Problems (Active)       OT Goals       Patient will complete functional transfers with mod I. (Progressing)       Start:  03/13/24    Expected End:  03/27/24            Patient will complete toileting with min A. (Progressing)       Start:  03/13/24    Expected End:  03/27/24            Patient will complete LE dressing with min A. (Progressing)       Start:  03/13/24    Expected End:  03/27/24

## 2024-03-14 VITALS
OXYGEN SATURATION: 94 % | BODY MASS INDEX: 34.18 KG/M2 | HEIGHT: 66 IN | HEART RATE: 68 BPM | DIASTOLIC BLOOD PRESSURE: 79 MMHG | RESPIRATION RATE: 18 BRPM | WEIGHT: 212.7 LBS | SYSTOLIC BLOOD PRESSURE: 143 MMHG | TEMPERATURE: 95.4 F

## 2024-03-14 PROBLEM — R68.0 HYPOTHERMIA DUE TO NON-ENVIRONMENTAL CAUSE: Status: RESOLVED | Noted: 2024-03-09 | Resolved: 2024-03-14

## 2024-03-14 PROBLEM — E16.2 HYPOGLYCEMIA: Status: RESOLVED | Noted: 2023-06-22 | Resolved: 2024-03-14

## 2024-03-14 LAB
ALBUMIN SERPL BCP-MCNC: 3.1 G/DL (ref 3.4–5)
ANION GAP SERPL CALC-SCNC: 13 MMOL/L (ref 10–20)
BUN SERPL-MCNC: 34 MG/DL (ref 6–23)
CALCIUM SERPL-MCNC: 8.7 MG/DL (ref 8.6–10.3)
CHLORIDE SERPL-SCNC: 111 MMOL/L (ref 98–107)
CO2 SERPL-SCNC: 25 MMOL/L (ref 21–32)
CREAT SERPL-MCNC: 1.34 MG/DL (ref 0.5–1.05)
EGFRCR SERPLBLD CKD-EPI 2021: 45 ML/MIN/1.73M*2
ERYTHROCYTE [DISTWIDTH] IN BLOOD BY AUTOMATED COUNT: 19.9 % (ref 11.5–14.5)
GLUCOSE BLD MANUAL STRIP-MCNC: 127 MG/DL (ref 74–99)
GLUCOSE BLD MANUAL STRIP-MCNC: 145 MG/DL (ref 74–99)
GLUCOSE BLD MANUAL STRIP-MCNC: 174 MG/DL (ref 74–99)
GLUCOSE SERPL-MCNC: 109 MG/DL (ref 74–99)
HCT VFR BLD AUTO: 32.4 % (ref 36–46)
HGB BLD-MCNC: 9.5 G/DL (ref 12–16)
MAGNESIUM SERPL-MCNC: 1.9 MG/DL (ref 1.6–2.4)
MCH RBC QN AUTO: 30.3 PG (ref 26–34)
MCHC RBC AUTO-ENTMCNC: 29.3 G/DL (ref 32–36)
MCV RBC AUTO: 103 FL (ref 80–100)
NRBC BLD-RTO: 0.4 /100 WBCS (ref 0–0)
PHOSPHATE SERPL-MCNC: 3 MG/DL (ref 2.5–4.9)
PLATELET # BLD AUTO: 71 X10*3/UL (ref 150–450)
POTASSIUM SERPL-SCNC: 4.6 MMOL/L (ref 3.5–5.3)
RBC # BLD AUTO: 3.14 X10*6/UL (ref 4–5.2)
SODIUM SERPL-SCNC: 144 MMOL/L (ref 136–145)
WBC # BLD AUTO: 6.9 X10*3/UL (ref 4.4–11.3)

## 2024-03-14 PROCEDURE — 2500000001 HC RX 250 WO HCPCS SELF ADMINISTERED DRUGS (ALT 637 FOR MEDICARE OP): Performed by: STUDENT IN AN ORGANIZED HEALTH CARE EDUCATION/TRAINING PROGRAM

## 2024-03-14 PROCEDURE — 99239 HOSP IP/OBS DSCHRG MGMT >30: CPT

## 2024-03-14 PROCEDURE — 82947 ASSAY GLUCOSE BLOOD QUANT: CPT

## 2024-03-14 PROCEDURE — 36415 COLL VENOUS BLD VENIPUNCTURE: CPT

## 2024-03-14 PROCEDURE — 80069 RENAL FUNCTION PANEL: CPT

## 2024-03-14 PROCEDURE — 2500000004 HC RX 250 GENERAL PHARMACY W/ HCPCS (ALT 636 FOR OP/ED): Performed by: STUDENT IN AN ORGANIZED HEALTH CARE EDUCATION/TRAINING PROGRAM

## 2024-03-14 PROCEDURE — 83735 ASSAY OF MAGNESIUM: CPT

## 2024-03-14 PROCEDURE — 2500000001 HC RX 250 WO HCPCS SELF ADMINISTERED DRUGS (ALT 637 FOR MEDICARE OP)

## 2024-03-14 PROCEDURE — 85027 COMPLETE CBC AUTOMATED: CPT

## 2024-03-14 RX ORDER — RISPERIDONE 0.5 MG/1
0.5 TABLET, ORALLY DISINTEGRATING ORAL NIGHTLY
Qty: 30 TABLET | Refills: 0 | Status: SHIPPED | OUTPATIENT
Start: 2024-03-14 | End: 2024-04-03 | Stop reason: SDUPTHER

## 2024-03-14 RX ORDER — DICLOFENAC SODIUM 10 MG/G
4 GEL TOPICAL 4 TIMES DAILY PRN
Status: DISCONTINUED | OUTPATIENT
Start: 2024-03-14 | End: 2024-03-14 | Stop reason: HOSPADM

## 2024-03-14 RX ORDER — PREDNISONE 20 MG/1
20 TABLET ORAL ONCE
Status: COMPLETED | OUTPATIENT
Start: 2024-03-14 | End: 2024-03-14

## 2024-03-14 RX ORDER — PREDNISONE 20 MG/1
20 TABLET ORAL DAILY
Qty: 30 TABLET | Refills: 0 | Status: SHIPPED | OUTPATIENT
Start: 2024-03-14 | End: 2024-04-13

## 2024-03-14 RX ADMIN — FOLIC ACID 1 MG: 1 TABLET ORAL at 09:05

## 2024-03-14 RX ADMIN — APIXABAN 2.5 MG: 2.5 TABLET, FILM COATED ORAL at 09:05

## 2024-03-14 RX ADMIN — AMLODIPINE BESYLATE 2.5 MG: 2.5 TABLET ORAL at 09:05

## 2024-03-14 RX ADMIN — PANTOPRAZOLE SODIUM 40 MG: 40 TABLET, DELAYED RELEASE ORAL at 09:05

## 2024-03-14 RX ADMIN — PREDNISONE 20 MG: 20 TABLET ORAL at 14:35

## 2024-03-14 RX ADMIN — MYCOPHENOLATE MOFETIL 1000 MG: 250 CAPSULE ORAL at 09:05

## 2024-03-14 RX ADMIN — LEVETIRACETAM 500 MG: 500 TABLET, FILM COATED ORAL at 05:16

## 2024-03-14 RX ADMIN — THIAMINE HCL TAB 100 MG 100 MG: 100 TAB at 09:05

## 2024-03-14 RX ADMIN — DICLOFENAC SODIUM 4 G: 10 GEL TOPICAL at 11:01

## 2024-03-14 RX ADMIN — NYSTATIN 1 APPLICATION: 100000 POWDER TOPICAL at 09:06

## 2024-03-14 ASSESSMENT — PAIN SCALES - GENERAL
PAINLEVEL_OUTOF10: 0 - NO PAIN
PAINLEVEL_OUTOF10: 0 - NO PAIN

## 2024-03-14 NOTE — PROGRESS NOTES
03/14/24 1118   Discharge Planning   Home or Post Acute Services In home services   Type of Home Care Services Home nursing visits;Home OT;Home PT   Patient expects to be discharged to: Home, resume [facility]     Met with patient at bedside to updated that out of pocket cost for copay at [facility] would be $203 per day. Patient states she will be going home and will resume [facility]. Patient requesting more intensive therapy with her [facility] agency- message sent to [facility] with request. Will need to send final home care orders once they are available for PT/OT/SN.     1306- Final home care orders sent to [facility], notified agency that patient will d/c home today.

## 2024-03-14 NOTE — DISCHARGE INSTRUCTIONS
You have been hospitalized for an acute episode of adrenal insufficiency. You have been treated with high-dose steroids, and have been transitioned to oral prednisone.    Please follow-up with your primary care provider within 7 days for hospital follow-up.  Additionally, please plan to follow-up with your rheumatologist.  Please also follow-up with your cardiologist/heart doctor for further management of your heart failure for goal-directed medical therapy (GDMT) in light of your other comorbid conditions.  Please call to make the appointment.     Please take your medications as prescribed. 2 new medication changes have occurred for you during this hospitalization.  It is very important that you take these 2 medicines daily (in addition to your other medications).  The following reflects these changes:    - Risperdal/risperidone 0.5 mg: Please take 1 tablet nightly.  This medication is to treat your hallucinations  - Prednisone 20 mg: Please take 1 tablet daily for the next 30 days.  Please follow-up with your rheumatologist and PCP    If you have any new or worsening symptoms, seek medical attention.    Thank you for allowing us to participate in your care!    -[facility] inpatient medicine teaching service

## 2024-03-14 NOTE — CARE PLAN
The patient's goals for the shift include      The clinical goals for the shift include see plan of care    Problem: Diabetes  Goal: Achieve decreasing blood glucose levels by end of shift  Outcome: Progressing  Goal: Increase stability of blood glucose readings by end of shift  Outcome: Progressing  Goal: Decrease in ketones present in urine by end of shift  Outcome: Progressing  Goal: Maintain electrolyte levels within acceptable range throughout shift  Outcome: Progressing  Goal: Maintain glucose levels >70mg/dl to <250mg/dl throughout shift  Outcome: Progressing  Goal: No changes in neurological exam by end of shift  Outcome: Progressing  Goal: Learn about and adhere to nutrition recommendations by end of shift  Outcome: Progressing  Goal: Vital signs within normal range for age by end of shift  Outcome: Progressing  Goal: Increase self care and/or family involovement by end of shift  Outcome: Progressing  Goal: Receive DSME education by end of shift  Outcome: Progressing     Problem: Skin  Goal: Decreased wound size/increased tissue granulation at next dressing change  Outcome: Progressing  Goal: Participates in plan/prevention/treatment measures  Outcome: Progressing  Goal: Prevent/manage excess moisture  Outcome: Progressing  Goal: Prevent/minimize sheer/friction injuries  Outcome: Progressing  Goal: Promote/optimize nutrition  Outcome: Progressing  Goal: Promote skin healing  Outcome: Progressing     Problem: Pain - Adult  Goal: Verbalizes/displays adequate comfort level or baseline comfort level  Outcome: Progressing     Problem: Safety - Adult  Goal: Free from fall injury  Outcome: Progressing     Problem: Discharge Planning  Goal: Discharge to home or other facility with appropriate resources  Outcome: Progressing     Problem: Chronic Conditions and Co-morbidities  Goal: Patient's chronic conditions and co-morbidity symptoms are monitored and maintained or improved  Outcome: Progressing   No complaints  overnight,she was oob to bsc with assist of  two person and walker. She is slow and unsteady when oob.  No c/o verbalized, assisted to turn and reposition in bed. Bed alarm on for safety

## 2024-03-14 NOTE — PROGRESS NOTES
"   03/14/24 0946   Discharge Planning   Home or Post Acute Services Post acute facilities (Rehab/SNF/etc);In home services   Type of Post Acute Facility Services Skilled nursing   Patient expects to be discharged to: Home with [facility] vs [facility]   Patient Choice   Patient / Family choosing to utilize agency / facility established prior to hospitalization Yes     Met with patient at bedside to update her that per [facility] admissions, pt has \"roughly 50 SNF days left and she is in her copay days\". Patient would like to know what the cost of the copay would be. If too costly, plan remains for patient to return home and resume [facility]. Message sent to Drew to inquire about cost.   "

## 2024-03-14 NOTE — HOSPITAL COURSE
Bing Holliday is a 63 y.o. 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 She was found to have acute AI, secondary in nature.    She was recently discharged from outside hospital ([facility]) 1/27, where she was admitted for AMS, metabolic disturbance 2/2 AI. At the time of this hospital discharge, she was to continue her home keppra dose, Eliquis, and Cellcept. She was discharged from this facility to SNF with instructions to take 15mg prednisone daily and follow up with her rheumatologist.    She arrived to [facility] 3/10/24 hypothermic to 30.5C, bradycardic to 52, normotensive 130/73, with O2 saturation >90% on RA.  Labs: BUN/creatinine 39/1.56, troponin 20 - >18, TSH 2.93 (normal), PT/INR normal, hemoglobin/hematocrit 7.9/27.3, platelets 78, VBG 7.2 2/57/32/20 3.3, UA protein 2+, UDS negative. CT head w/o: No evidence of acute cortical infarct or intracranial hemorrhage. CT C-spine: No evidence of acute fracture or subluxation with degenerative changes at multiple levels.    Endocrinology was consulted. She was initially managed with stress-dose hydrocortisone. There was no obvious infectious component to her presentation of AI, given blood cultures remained negative, UA negative for infection, COVID/Flu negative. Anti-dsDNA <1 to indicate no SLE flare. She was transitioned to prednisone 20 mg once out of acute AI, with stabilization of vitals and BS. She experienced episodes of hypothermia and bradycardia with transition from stress dose steroids to prednisone, though deemed medically stable for discharge on prednisone 20mg given asymptomatic presentation. It is recommended that she follow-up with DEXA scan to assess osteoporosis and need for  bisphosphonate therapy.    Also during her hospitalization, she experienced acute blood loss anemia of hgb 6.8, which incremented appropriately after 1U PRBC. She had no active s/s acute bleeding. Risperidone 0.5 mg was re-initiated during her stay given visual hallucinations. Hallucinations ceased with risperidone. She will be sent with a new prescription for 0.5mg risperdone at bedtime.     Given complaint of recent dyspnea and LE edema, TTE obtained showing LVEF 40 to 45% and low normal RV systolic function. Mild/moderate MR. Moderately elevated RVSP of 49.5, global hypokinesis of left ventricle with minor regional variations. Lasix was trialed given mild JED development and concern for possible cardiorenal component, though cr did not improve with diuresis. Cr stabilized at the time of discharge. Given HFrEF, there are indications for GDMT, though difficult to implement given the following: allergy to lisinopril, do not feel BB is safe given she presented bradycardic in the 40's. Jardiance considered, though not felt safe given hypoglycemic episodes. She was encouraged to follow-up with her cardiologist, Dr. Arenas, for further GDMT management in light of her other comorbidities.     On 3/14, she was deemed medically stable for discharge. She will continue her home medications as prescribed with the addition of prednisone 20mg every day. Patient and son, López, were counceled on importance of medical compliance to medications. She will follow up with her home endocrinologist, rheumatologist, cardiologist, and PCP upon discharge. She will be discharged home with external orders for [facility] for PTOT and skilled nursing.

## 2024-03-14 NOTE — DISCHARGE SUMMARY
Discharge Diagnosis  Hypothermia due to non-environmental cause    Issues Requiring Follow-Up  Adrenal insufficiency  Heart failure    Discharge Meds     Your medication list        START taking these medications        Instructions Last Dose Given Next Dose Due   risperiDONE 0.5 mg disintegrating tablet  Commonly known as: RisperDAL M-TAB      Take 1 tablet (0.5 mg) by mouth once daily at bedtime.              CHANGE how you take these medications        Instructions Last Dose Given Next Dose Due   predniSONE 20 mg tablet  Commonly known as: Deltasone  What changed:   medication strength  how much to take  when to take this      Take 1 tablet (20 mg) by mouth once daily.              CONTINUE taking these medications        Instructions Last Dose Given Next Dose Due   albuterol 90 mcg/actuation inhaler           amLODIPine 2.5 mg tablet  Commonly known as: Norvasc           apixaban 2.5 mg tablet  Commonly known as: Eliquis      Take 1 tablet (2.5 mg) by mouth 2 times a day.       atorvastatin 40 mg tablet  Commonly known as: Lipitor      Take 1 tablet (40 mg) by mouth once daily.       Dexcom G6  misc  Generic drug: blood-glucose meter,continuous      Use as instructed       Dexcom G6 Sensor device  Generic drug: blood-glucose sensor      Use to check sugars 3 times daily       Dexcom G6 Transmitter device  Generic drug: blood-glucose transmitter device      Use as instructed       folic acid 1 mg tablet  Commonly known as: Folvite      TAKE 1 TABLET BY MOUTH EVERY DAY       insulin lispro 100 unit/mL injection  Commonly known as: Admelog SoloStar U-100 Insulin      Take as directed per insulin instructions. Sliding scale: 0-150 = 0; 151-200 = 2; 201-250 = 4; 251-300=6; 301-350=8; 351-400= 10; greater than 400, call MD, subcutaneously before meals for DM.       Lantus Solostar U-100 Insulin 100 unit/mL (3 mL) pen  Generic drug: insulin glargine      Inject 20 Units under the skin once daily at bedtime.  "Take as directed per insulin instructions.       levETIRAcetam 500 mg tablet  Commonly known as: Keppra      Take 1 tablet (500 mg) by mouth every 12 hours.       LOKELMA ORAL           loperamide 2 mg capsule  Commonly known as: Imodium      Take 1 capsule (2 mg) by mouth 4 times a day as needed for diarrhea.       melatonin 5 mg tablet           multivitamin tablet           mycophenolate 500 mg tablet  Commonly known as: Cellcept      TAKE 2 TABLETS BY MOUTH TWICE A DAY       pantoprazole 40 mg EC tablet  Commonly known as: ProtoNix      TAKE 1 TABLET BY MOUTH EVERY DAY       pen needle, diabetic 31 gauge x 5/16\" needle      Use to inject 1-4 times daily as directed.       thiamine 100 mg tablet  Commonly known as: Vitamin B-1           Trelegy Ellipta 200-62.5-25 mcg blister with device  Generic drug: fluticasone-umeclidin-vilanter      Inhale 1 puff once daily.              STOP taking these medications      acetaminophen 325 mg tablet  Commonly known as: Tylenol                  Where to Get Your Medications        These medications were sent to [facility]/pharmacy #[id number] - [place], [place] - [address] [facility] [address]  [address] [facility] [address]      Phone: [phone]   predniSONE 20 mg tablet  risperiDONE 0.5 mg disintegrating tablet         Test Results Pending At Discharge  Pending Labs       No current pending labs.            Hospital Course  Bing Holliday is a 63 y.o. 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 She was found to have acute AI, secondary in nature.    She was recently discharged from outside hospital ([facility]) 1/27, where she was admitted for AMS, metabolic disturbance 2/2 AI. At the time of this hospital " discharge, she was to continue her home keppra dose, Eliquis, and Cellcept. She was discharged from this facility to SNF with instructions to take 15mg prednisone daily and follow up with her rheumatologist.    She arrived to [facility] 3/10/24 hypothermic to 30.5C, bradycardic to 52, normotensive 130/73, with O2 saturation >90% on RA.  Labs: BUN/creatinine 39/1.56, troponin 20 - >18, TSH 2.93 (normal), PT/INR normal, hemoglobin/hematocrit 7.9/27.3, platelets 78, VBG 7.2 2/57/32/20 3.3, UA protein 2+, UDS negative. CT head w/o: No evidence of acute cortical infarct or intracranial hemorrhage. CT C-spine: No evidence of acute fracture or subluxation with degenerative changes at multiple levels.    Endocrinology was consulted. She was initially managed with stress-dose hydrocortisone. There was no obvious infectious component to her presentation of AI, given blood cultures remained negative, UA negative for infection, COVID/Flu negative. Anti-dsDNA <1 to indicate no SLE flare. She was transitioned to prednisone 20 mg once out of acute AI, with stabilization of vitals and BS. She experienced episodes of hypothermia and bradycardia with transition from stress dose steroids to prednisone, though deemed medically stable for discharge on prednisone 20mg given asymptomatic presentation. It is recommended that she follow-up with DEXA scan to assess osteoporosis and need for bisphosphonate therapy.    Also during her hospitalization, she experienced acute blood loss anemia of hgb 6.8, which incremented appropriately after 1U PRBC. She had no active s/s acute bleeding. Risperidone 0.5 mg was re-initiated during her stay given visual hallucinations. Hallucinations ceased with risperidone. She will be sent with a new prescription for 0.5mg risperdone at bedtime.     Given complaint of recent dyspnea and LE edema, TTE obtained showing LVEF 40 to 45% and low normal RV systolic function. Mild/moderate MR. Moderately elevated RVSP of  49.5, global hypokinesis of left ventricle with minor regional variations. Lasix was trialed given mild JED development and concern for possible cardiorenal component, though cr did not improve with diuresis. Cr stabilized at the time of discharge. Given HFrEF, there are indications for GDMT, though difficult to implement given the following: allergy to lisinopril, do not feel BB is safe given she presented bradycardic in the 40's. Jardiance considered, though not felt safe given hypoglycemic episodes. She was encouraged to follow-up with her cardiologist, Dr. Arenas, for further GDMT management in light of her other comorbidities.     On 3/14, she was deemed medically stable for discharge. She will continue her home medications as prescribed with the addition of prednisone 20mg every day. Patient and son, López, were counceled on importance of medical compliance to medications. She will follow up with her home endocrinologist, cardiologist, and PCP upon discharge. She will be discharged home with external orders for HHC for PTOT and skilled nursing.     Pertinent Physical Exam At Time of Discharge  Physical Exam  Constitutional:       Appearance: Normal appearance.  ANO x 4  HENT:      Head: Normocephalic.   Cardiovascular:      Rate and Rhythm: Normal rate and regular rhythm.      Heart sounds: Murmur heard with prominent S2.   Pulmonary:      Effort: No respiratory distress.      Breath sounds: Normal breath sounds. No wheezing, rhonchi or rales.   Abdominal:      Palpations: Abdomen is soft.      Tenderness: There is no abdominal tenderness. There is no guarding.   Musculoskeletal:         General: No swelling or tenderness.      Right lower le-2+ nonpitting edema present.      Left lower le-2+ nonpitting edema present.   Skin:     General: Skin is dry.      Capillary Refill: Capillary refill takes less than 2 seconds.   Neurological:      General: No focal deficit present.      Mental Status: She is  alert and oriented to person, place, and time.   Psychiatric:         Mood and Affect: Mood normal  Outpatient Follow-Up  Future Appointments   Date Time Provider Department Center   3/28/2024  1:30 PM Claudio Kearney MD [id number] [place]   8/13/2024 10:30 AM Michael Arenas MD [id number] [place]       Yassine Win, DO

## 2024-03-15 ENCOUNTER — PATIENT OUTREACH (OUTPATIENT)
Dept: PRIMARY CARE | Facility: CLINIC | Age: 64
End: 2024-03-15
Payer: MEDICARE

## 2024-03-15 DIAGNOSIS — E11.42 DM TYPE 2 WITH DIABETIC PERIPHERAL NEUROPATHY (MULTI): Chronic | ICD-10-CM

## 2024-03-15 DIAGNOSIS — E27.40 ADRENAL INSUFFICIENCY (MULTI): ICD-10-CM

## 2024-03-15 DIAGNOSIS — R56.9 SEIZURE (MULTI): ICD-10-CM

## 2024-03-15 DIAGNOSIS — N18.4 CHRONIC KIDNEY DISEASE, STAGE 4 (SEVERE) (MULTI): ICD-10-CM

## 2024-03-15 DIAGNOSIS — M32.9 LUPUS (MULTI): ICD-10-CM

## 2024-03-15 DIAGNOSIS — R68.0 HYPOTHERMIA DUE TO NON-ENVIRONMENTAL CAUSE: Primary | ICD-10-CM

## 2024-03-15 PROCEDURE — RXMED WILLOW AMBULATORY MEDICATION CHARGE

## 2024-03-15 NOTE — PROGRESS NOTES
Discharge facility: [facility]  Discharge diagnosis:   Hypothermia due to non-environmental cause   Admission date: 3/9/24  Discharge date: 3/14/24    PCP Appointment Date: Patient is NOT scheduled for a hospital follow up due to no available appointments. Tasked to the office. If follow up is scheduled within 14 days of discharge, visit is TCM billable.  Specialist Appointment Date:   Cardiology (Dr. Tim) -   Endocrinology (Dr. Sandoval) -   Hospital Encounter and Summary: Linked   See Discharge assessment below for further details.    Engagement  Call Start Time: 1524 (3/15/2024  3:24 PM)    Medications  Medication Comments: START: Risperdal/risperidone 0.5 mg: Please take 1 tablet nightly. This medication is to treat your hallucinations  - Change: Prednisone 20 mg (previously 10mg) : Please take 1 tablet daily for the next 30 days. Stop : Ty;lenol 325mg. (3/15/2024  3:24 PM)    Wrap Up  Wrap Up Additional Comments: (S) Patient reports doesn't remember events that lead up to admission. States her son says he came in her room at 5 AM and she was on the floor. During phone call patient reports [facility] is over and she will call back after they leave. PAtient failed to call back. 2 more attempts were made and VM left. [facility] at home was also referred to but has not been able to contact patient. Notified office for appointment and PCP. (3/15/2024  3:24 PM)  Call End Time: 1528 (3/15/2024  3:24 PM)

## 2024-03-18 ENCOUNTER — PHARMACY VISIT (OUTPATIENT)
Dept: PHARMACY | Facility: CLINIC | Age: 64
End: 2024-03-18
Payer: MEDICARE

## 2024-03-18 ENCOUNTER — TELEPHONE (OUTPATIENT)
Dept: PRIMARY CARE | Facility: CLINIC | Age: 64
End: 2024-03-18
Payer: MEDICARE

## 2024-03-18 ENCOUNTER — PATIENT OUTREACH (OUTPATIENT)
Dept: HOME HEALTH SERVICES | Age: 64
End: 2024-03-18
Payer: MEDICARE

## 2024-03-18 NOTE — PROGRESS NOTES
Three attempts have been made to contact the patient regarding enrollment in [facility] ([facility] Home program) unsuccessfully. The patient has not responded to the Voicemails. She will be removed from the call list if she or family has not returned a call by 2100 this evening, but she still has the option to enroll if she calls in and requests it. A message was left on voicemail of gregoria Flores with brief description of the program and request for call back.    Patient's Address:   [address] [place] [address] [address] [place] [address]  **  If this is not the address patient will receive services - alert team and address in EMR**       Patient Contacts:  Extended Emergency Contact Information  Primary Emergency Contact: Sandra Holliday  Address: [address] [place] [address] [address] [place] [address] of [facility]  Home Phone: [phone]  Mobile Phone: [phone]  Relation: Daughter  Secondary Emergency Contact: ALE LAZCANO  Mobile Phone: [phone]  Relation: Son   needed? No                                Patient's Preferred Phone: [phone]  Patient's E-mail: [email or web address]

## 2024-03-18 NOTE — TELEPHONE ENCOUNTER
Nancy Coleman         3/15/24  8:30 AM  Note     Discharge facility: [facility]  Discharge diagnosis:   Hypothermia due to non-environmental cause   Admission date: 3/9/24  Discharge date: 3/14/24     PCP Appointment Date: Patient is NOT scheduled for a hospital follow up due to no available appointments. Tasked to the office. If follow up is scheduled within 14 days of discharge, visit is TCM billable.  Specialist Appointment Date:   Cardiology (Dr. Tim) -   Endocrinology (Dr. Sandoval) -   Hospital Encounter and Summary: Linked   See Discharge assessment below for further details.     Engagement  Call Start Time: 1524 (3/15/2024  3:24 PM)     Medications  Medication Comments: START: Risperdal/risperidone 0.5 mg: Please take 1 tablet nightly. This medication is to treat your hallucinations  - Change: Prednisone 20 mg (previously 10mg) : Please take 1 tablet daily for the next 30 days. Stop : Ty;lenol 325mg. (3/15/2024  3:24 PM)     Wrap Up  Wrap Up Additional Comments: (S) Patient reports doesn't remember events that lead up to admission. States her son says he came in her room at 5 AM and she was on the floor. During phone call patient reports [facility] is over and she will call back after they leave. PAtient failed to call back. 2 more attempts were made and VM left. [facility] home was also referred to but has not been able to contact patient. Notified office for appointment and PCP. (3/15/2024  3:24 PM)  Call End Time: 1528 (3/15/2024  3:24 PM)                          3/15/24  9:49 AM  Nancy Coleman routed this conversation to Claudio Hood DO               3/15/24  9:58 AM  Nancy Coleman attempted to contact Bing Holliday (Left Message)                3/15/24  3:24 PM  Nancy Coleman contacted Bing Holliday     March 18, 2024             3/18/24  1:50 PM  Nancy Coleman attempted to contact Bing Holliday (Left Message)       Nancy Coleman   to [facility] [id number] [employer]er[employer]  Claudio Hood DO          3/18/24  1:58 PM  TCM call completed: 3/18  Patient is NOT scheduled for hospital follow up. I was unable to reach patient during two business days after discharge despite at least two attempts made.  Task sent to  for follow up appt.    If patient returns call and schedules follow up within 7-14 days (by 3/27) of discharge please use codes below.  If patient meets criteria for moderately complex medical decision-making and has follow-up within 7, or 8-14 days-can bill [id number] for hospital follow up.  If patient meets criteria for highly complex medical decision making and has follow-up visit within 7 days-can bill [id number] for hospital follow up

## 2024-03-19 ENCOUNTER — HOSPITAL ENCOUNTER (INPATIENT)
Facility: HOSPITAL | Age: 64
LOS: 5 days | Discharge: NURSING, REHAB OR LONG-TERM CARE | DRG: 643 | End: 2024-03-26
Attending: STUDENT IN AN ORGANIZED HEALTH CARE EDUCATION/TRAINING PROGRAM | Admitting: INTERNAL MEDICINE
Payer: MEDICARE

## 2024-03-19 ENCOUNTER — TELEPHONE (OUTPATIENT)
Dept: PRIMARY CARE | Facility: CLINIC | Age: 64
End: 2024-03-19
Payer: MEDICARE

## 2024-03-19 ENCOUNTER — APPOINTMENT (OUTPATIENT)
Dept: RADIOLOGY | Facility: HOSPITAL | Age: 64
DRG: 643 | End: 2024-03-19
Payer: MEDICARE

## 2024-03-19 ENCOUNTER — APPOINTMENT (OUTPATIENT)
Dept: CARDIOLOGY | Facility: HOSPITAL | Age: 64
DRG: 643 | End: 2024-03-19
Payer: MEDICARE

## 2024-03-19 DIAGNOSIS — H35.039 HYPERTENSIVE RETINOPATHY, UNSPECIFIED LATERALITY: ICD-10-CM

## 2024-03-19 DIAGNOSIS — R53.1 GENERALIZED WEAKNESS: Primary | ICD-10-CM

## 2024-03-19 LAB
ATRIAL RATE: 94 BPM
BASOPHILS # BLD AUTO: 0 X10*3/UL (ref 0–0.1)
BASOPHILS NFR BLD AUTO: 0 %
BNP SERPL-MCNC: 106 PG/ML (ref 0–99)
CARDIAC TROPONIN I PNL SERPL HS: 22 NG/L (ref 0–13)
EOSINOPHIL # BLD AUTO: 0.01 X10*3/UL (ref 0–0.7)
EOSINOPHIL NFR BLD AUTO: 0.2 %
ERYTHROCYTE [DISTWIDTH] IN BLOOD BY AUTOMATED COUNT: 19.8 % (ref 11.5–14.5)
HCT VFR BLD AUTO: 29.1 % (ref 36–46)
HGB BLD-MCNC: 8.7 G/DL (ref 12–16)
IMM GRANULOCYTES # BLD AUTO: 0.09 X10*3/UL (ref 0–0.7)
IMM GRANULOCYTES NFR BLD AUTO: 1.4 % (ref 0–0.9)
LYMPHOCYTES # BLD AUTO: 0.41 X10*3/UL (ref 1.2–4.8)
LYMPHOCYTES NFR BLD AUTO: 6.5 %
MCH RBC QN AUTO: 29.7 PG (ref 26–34)
MCHC RBC AUTO-ENTMCNC: 29.9 G/DL (ref 32–36)
MCV RBC AUTO: 99 FL (ref 80–100)
MONOCYTES # BLD AUTO: 0.19 X10*3/UL (ref 0.1–1)
MONOCYTES NFR BLD AUTO: 3 %
NEUTROPHILS # BLD AUTO: 5.61 X10*3/UL (ref 1.2–7.7)
NEUTROPHILS NFR BLD AUTO: 88.9 %
NRBC BLD-RTO: 0.8 /100 WBCS (ref 0–0)
P AXIS: 50 DEGREES
P OFFSET: 200 MS
P ONSET: 143 MS
PLATELET # BLD AUTO: 124 X10*3/UL (ref 150–450)
PR INTERVAL: 150 MS
Q ONSET: 218 MS
QRS COUNT: 16 BEATS
QRS DURATION: 86 MS
QT INTERVAL: 342 MS
QTC CALCULATION(BAZETT): 427 MS
QTC FREDERICIA: 397 MS
R AXIS: -17 DEGREES
RBC # BLD AUTO: 2.93 X10*6/UL (ref 4–5.2)
T AXIS: 168 DEGREES
T OFFSET: 389 MS
VENTRICULAR RATE: 94 BPM
WBC # BLD AUTO: 6.3 X10*3/UL (ref 4.4–11.3)

## 2024-03-19 PROCEDURE — 96376 TX/PRO/DX INJ SAME DRUG ADON: CPT

## 2024-03-19 PROCEDURE — 93005 ELECTROCARDIOGRAM TRACING: CPT

## 2024-03-19 PROCEDURE — 96366 THER/PROPH/DIAG IV INF ADDON: CPT

## 2024-03-19 PROCEDURE — 99285 EMERGENCY DEPT VISIT HI MDM: CPT | Mod: 25

## 2024-03-19 PROCEDURE — 84484 ASSAY OF TROPONIN QUANT: CPT | Performed by: STUDENT IN AN ORGANIZED HEALTH CARE EDUCATION/TRAINING PROGRAM

## 2024-03-19 PROCEDURE — 71045 X-RAY EXAM CHEST 1 VIEW: CPT | Performed by: STUDENT IN AN ORGANIZED HEALTH CARE EDUCATION/TRAINING PROGRAM

## 2024-03-19 PROCEDURE — 85025 COMPLETE CBC W/AUTO DIFF WBC: CPT | Performed by: STUDENT IN AN ORGANIZED HEALTH CARE EDUCATION/TRAINING PROGRAM

## 2024-03-19 PROCEDURE — 85652 RBC SED RATE AUTOMATED: CPT

## 2024-03-19 PROCEDURE — 99285 EMERGENCY DEPT VISIT HI MDM: CPT | Performed by: STUDENT IN AN ORGANIZED HEALTH CARE EDUCATION/TRAINING PROGRAM

## 2024-03-19 PROCEDURE — 80053 COMPREHEN METABOLIC PANEL: CPT | Performed by: STUDENT IN AN ORGANIZED HEALTH CARE EDUCATION/TRAINING PROGRAM

## 2024-03-19 PROCEDURE — 93010 ELECTROCARDIOGRAM REPORT: CPT | Performed by: STUDENT IN AN ORGANIZED HEALTH CARE EDUCATION/TRAINING PROGRAM

## 2024-03-19 PROCEDURE — 83880 ASSAY OF NATRIURETIC PEPTIDE: CPT | Performed by: STUDENT IN AN ORGANIZED HEALTH CARE EDUCATION/TRAINING PROGRAM

## 2024-03-19 PROCEDURE — 36415 COLL VENOUS BLD VENIPUNCTURE: CPT | Performed by: STUDENT IN AN ORGANIZED HEALTH CARE EDUCATION/TRAINING PROGRAM

## 2024-03-19 PROCEDURE — 87636 SARSCOV2 & INF A&B AMP PRB: CPT | Performed by: STUDENT IN AN ORGANIZED HEALTH CARE EDUCATION/TRAINING PROGRAM

## 2024-03-19 PROCEDURE — 96365 THER/PROPH/DIAG IV INF INIT: CPT

## 2024-03-19 PROCEDURE — 71045 X-RAY EXAM CHEST 1 VIEW: CPT

## 2024-03-19 PROCEDURE — 96375 TX/PRO/DX INJ NEW DRUG ADDON: CPT

## 2024-03-19 PROCEDURE — 83735 ASSAY OF MAGNESIUM: CPT | Performed by: STUDENT IN AN ORGANIZED HEALTH CARE EDUCATION/TRAINING PROGRAM

## 2024-03-19 ASSESSMENT — PAIN SCALES - GENERAL
PAINLEVEL_OUTOF10: 0 - NO PAIN
PAINLEVEL_OUTOF10: 0 - NO PAIN

## 2024-03-19 ASSESSMENT — LIFESTYLE VARIABLES
HAVE PEOPLE ANNOYED YOU BY CRITICIZING YOUR DRINKING: NO
EVER FELT BAD OR GUILTY ABOUT YOUR DRINKING: NO
EVER HAD A DRINK FIRST THING IN THE MORNING TO STEADY YOUR NERVES TO GET RID OF A HANGOVER: NO
HAVE YOU EVER FELT YOU SHOULD CUT DOWN ON YOUR DRINKING: NO

## 2024-03-19 ASSESSMENT — PAIN - FUNCTIONAL ASSESSMENT: PAIN_FUNCTIONAL_ASSESSMENT: 0-10

## 2024-03-19 NOTE — TELEPHONE ENCOUNTER
Iris, nurse with [facility] is calling because it was brought to her attention last night that this patient is having increased edema in her left lower extremity with weeping. She's asymptomatic, no SOB but over the past couple of days there has been the increase of the edema. Wanted to know if would want to order a diuretic?   Aware you are out of the office today and wanted this message left with you tomorrow when you come in  Iris's # is [phone]  She states it would be better to call the office of [facility] and speak with her supervisor to give a verbal order at [phone]    Ok for rx?  Or?  Please advise  Thanks

## 2024-03-20 ENCOUNTER — APPOINTMENT (OUTPATIENT)
Dept: RADIOLOGY | Facility: HOSPITAL | Age: 64
DRG: 643 | End: 2024-03-20
Payer: MEDICARE

## 2024-03-20 ENCOUNTER — APPOINTMENT (OUTPATIENT)
Dept: CARDIOLOGY | Facility: HOSPITAL | Age: 64
DRG: 643 | End: 2024-03-20
Payer: MEDICARE

## 2024-03-20 PROBLEM — R53.1 GENERALIZED WEAKNESS: Status: ACTIVE | Noted: 2024-03-20

## 2024-03-20 LAB
ABO GROUP (TYPE) IN BLOOD: NORMAL
ALBUMIN SERPL BCP-MCNC: 3.1 G/DL (ref 3.4–5)
ALBUMIN SERPL BCP-MCNC: 3.4 G/DL (ref 3.4–5)
ALP SERPL-CCNC: 121 U/L (ref 33–136)
ALT SERPL W P-5'-P-CCNC: 24 U/L (ref 7–45)
AMMONIA PLAS-SCNC: 22 UMOL/L (ref 16–53)
ANION GAP SERPL CALC-SCNC: 12 MMOL/L (ref 10–20)
ANION GAP SERPL CALC-SCNC: 14 MMOL/L (ref 10–20)
ANTIBODY SCREEN: NORMAL
APPEARANCE UR: ABNORMAL
AST SERPL W P-5'-P-CCNC: 32 U/L (ref 9–39)
BACTERIA #/AREA URNS AUTO: ABNORMAL /HPF
BILIRUB SERPL-MCNC: 0.4 MG/DL (ref 0–1.2)
BILIRUB UR STRIP.AUTO-MCNC: NEGATIVE MG/DL
BUN SERPL-MCNC: 49 MG/DL (ref 6–23)
BUN SERPL-MCNC: 51 MG/DL (ref 6–23)
CALCIUM SERPL-MCNC: 9 MG/DL (ref 8.6–10.3)
CALCIUM SERPL-MCNC: 9.2 MG/DL (ref 8.6–10.3)
CARDIAC TROPONIN I PNL SERPL HS: 23 NG/L (ref 0–13)
CHLORIDE SERPL-SCNC: 109 MMOL/L (ref 98–107)
CHLORIDE SERPL-SCNC: 111 MMOL/L (ref 98–107)
CO2 SERPL-SCNC: 24 MMOL/L (ref 21–32)
CO2 SERPL-SCNC: 25 MMOL/L (ref 21–32)
COLOR UR: YELLOW
CREAT SERPL-MCNC: 1.82 MG/DL (ref 0.5–1.05)
CREAT SERPL-MCNC: 1.96 MG/DL (ref 0.5–1.05)
CRP SERPL-MCNC: 1.7 MG/DL
DSDNA AB SER-ACNC: <1 IU/ML
EGFRCR SERPLBLD CKD-EPI 2021: 28 ML/MIN/1.73M*2
EGFRCR SERPLBLD CKD-EPI 2021: 31 ML/MIN/1.73M*2
ERYTHROCYTE [DISTWIDTH] IN BLOOD BY AUTOMATED COUNT: 19.8 % (ref 11.5–14.5)
ERYTHROCYTE [SEDIMENTATION RATE] IN BLOOD BY WESTERGREN METHOD: 59 MM/H (ref 0–30)
FLUAV RNA RESP QL NAA+PROBE: NOT DETECTED
FLUBV RNA RESP QL NAA+PROBE: NOT DETECTED
FSH SERPL-ACNC: 61.5 IU/L
GLUCOSE BLD MANUAL STRIP-MCNC: 116 MG/DL (ref 74–99)
GLUCOSE BLD MANUAL STRIP-MCNC: 122 MG/DL (ref 74–99)
GLUCOSE BLD MANUAL STRIP-MCNC: 128 MG/DL (ref 74–99)
GLUCOSE BLD MANUAL STRIP-MCNC: 175 MG/DL (ref 74–99)
GLUCOSE BLD MANUAL STRIP-MCNC: 185 MG/DL (ref 74–99)
GLUCOSE SERPL-MCNC: 185 MG/DL (ref 74–99)
GLUCOSE SERPL-MCNC: 201 MG/DL (ref 74–99)
GLUCOSE UR STRIP.AUTO-MCNC: NEGATIVE MG/DL
HCT VFR BLD AUTO: 26.9 % (ref 36–46)
HGB BLD-MCNC: 8.2 G/DL (ref 12–16)
KETONES UR STRIP.AUTO-MCNC: NEGATIVE MG/DL
LACTATE SERPL-SCNC: 0.7 MMOL/L (ref 0.4–2)
LEUKOCYTE ESTERASE UR QL STRIP.AUTO: ABNORMAL
LH SERPL-ACNC: 34.1 IU/L
MAGNESIUM SERPL-MCNC: 1.73 MG/DL (ref 1.6–2.4)
MCH RBC QN AUTO: 30.6 PG (ref 26–34)
MCHC RBC AUTO-ENTMCNC: 30.5 G/DL (ref 32–36)
MCV RBC AUTO: 100 FL (ref 80–100)
NITRITE UR QL STRIP.AUTO: NEGATIVE
NRBC BLD-RTO: 0.6 /100 WBCS (ref 0–0)
PH UR STRIP.AUTO: 5 [PH]
PHOSPHATE SERPL-MCNC: 3.3 MG/DL (ref 2.5–4.9)
PLATELET # BLD AUTO: 110 X10*3/UL (ref 150–450)
POTASSIUM SERPL-SCNC: 4.3 MMOL/L (ref 3.5–5.3)
POTASSIUM SERPL-SCNC: 5.2 MMOL/L (ref 3.5–5.3)
PROLACTIN SERPL-MCNC: 49.7 UG/L (ref 3–20)
PROT SERPL-MCNC: 6.4 G/DL (ref 6.4–8.2)
PROT UR STRIP.AUTO-MCNC: ABNORMAL MG/DL
RBC # BLD AUTO: 2.68 X10*6/UL (ref 4–5.2)
RBC # UR STRIP.AUTO: NEGATIVE /UL
RBC #/AREA URNS AUTO: ABNORMAL /HPF
RH FACTOR (ANTIGEN D): NORMAL
SARS-COV-2 RNA RESP QL NAA+PROBE: NOT DETECTED
SODIUM SERPL-SCNC: 143 MMOL/L (ref 136–145)
SODIUM SERPL-SCNC: 143 MMOL/L (ref 136–145)
SP GR UR STRIP.AUTO: 1.01
T4 FREE SERPL-MCNC: 1.01 NG/DL (ref 0.61–1.12)
TSH SERPL-ACNC: 0.99 MIU/L (ref 0.44–3.98)
TSH SERPL-ACNC: 1.29 MIU/L (ref 0.44–3.98)
UROBILINOGEN UR STRIP.AUTO-MCNC: <2 MG/DL
VIT B12 SERPL-MCNC: 1499 PG/ML (ref 211–911)
WBC # BLD AUTO: 5.5 X10*3/UL (ref 4.4–11.3)
WBC #/AREA URNS AUTO: >50 /HPF

## 2024-03-20 PROCEDURE — 82607 VITAMIN B-12: CPT | Mod: STJLAB

## 2024-03-20 PROCEDURE — 96372 THER/PROPH/DIAG INJ SC/IM: CPT

## 2024-03-20 PROCEDURE — 74176 CT ABD & PELVIS W/O CONTRAST: CPT

## 2024-03-20 PROCEDURE — 84305 ASSAY OF SOMATOMEDIN: CPT | Performed by: INTERNAL MEDICINE

## 2024-03-20 PROCEDURE — 2500000004 HC RX 250 GENERAL PHARMACY W/ HCPCS (ALT 636 FOR OP/ED)

## 2024-03-20 PROCEDURE — 70553 MRI BRAIN STEM W/O & W/DYE: CPT

## 2024-03-20 PROCEDURE — 70450 CT HEAD/BRAIN W/O DYE: CPT

## 2024-03-20 PROCEDURE — 82947 ASSAY GLUCOSE BLOOD QUANT: CPT

## 2024-03-20 PROCEDURE — 83605 ASSAY OF LACTIC ACID: CPT

## 2024-03-20 PROCEDURE — 74176 CT ABD & PELVIS W/O CONTRAST: CPT | Performed by: RADIOLOGY

## 2024-03-20 PROCEDURE — 80069 RENAL FUNCTION PANEL: CPT

## 2024-03-20 PROCEDURE — G0378 HOSPITAL OBSERVATION PER HR: HCPCS

## 2024-03-20 PROCEDURE — 2550000001 HC RX 255 CONTRASTS

## 2024-03-20 PROCEDURE — 86225 DNA ANTIBODY NATIVE: CPT | Mod: STJLAB

## 2024-03-20 PROCEDURE — 83001 ASSAY OF GONADOTROPIN (FSH): CPT | Mod: STJLAB | Performed by: INTERNAL MEDICINE

## 2024-03-20 PROCEDURE — 36415 COLL VENOUS BLD VENIPUNCTURE: CPT

## 2024-03-20 PROCEDURE — 85027 COMPLETE CBC AUTOMATED: CPT

## 2024-03-20 PROCEDURE — A9575 INJ GADOTERATE MEGLUMI 0.1ML: HCPCS

## 2024-03-20 PROCEDURE — 2500000002 HC RX 250 W HCPCS SELF ADMINISTERED DRUGS (ALT 637 FOR MEDICARE OP, ALT 636 FOR OP/ED)

## 2024-03-20 PROCEDURE — 82140 ASSAY OF AMMONIA: CPT

## 2024-03-20 PROCEDURE — 93005 ELECTROCARDIOGRAM TRACING: CPT

## 2024-03-20 PROCEDURE — 87040 BLOOD CULTURE FOR BACTERIA: CPT | Mod: STJLAB

## 2024-03-20 PROCEDURE — 86901 BLOOD TYPING SEROLOGIC RH(D): CPT

## 2024-03-20 PROCEDURE — 99223 1ST HOSP IP/OBS HIGH 75: CPT

## 2024-03-20 PROCEDURE — 36415 COLL VENOUS BLD VENIPUNCTURE: CPT | Performed by: STUDENT IN AN ORGANIZED HEALTH CARE EDUCATION/TRAINING PROGRAM

## 2024-03-20 PROCEDURE — 84443 ASSAY THYROID STIM HORMONE: CPT | Performed by: INTERNAL MEDICINE

## 2024-03-20 PROCEDURE — C9113 INJ PANTOPRAZOLE SODIUM, VIA: HCPCS

## 2024-03-20 PROCEDURE — 84484 ASSAY OF TROPONIN QUANT: CPT | Performed by: STUDENT IN AN ORGANIZED HEALTH CARE EDUCATION/TRAINING PROGRAM

## 2024-03-20 PROCEDURE — 71250 CT THORAX DX C-: CPT | Performed by: RADIOLOGY

## 2024-03-20 PROCEDURE — 86140 C-REACTIVE PROTEIN: CPT

## 2024-03-20 PROCEDURE — 81001 URINALYSIS AUTO W/SCOPE: CPT

## 2024-03-20 PROCEDURE — 70553 MRI BRAIN STEM W/O & W/DYE: CPT | Performed by: STUDENT IN AN ORGANIZED HEALTH CARE EDUCATION/TRAINING PROGRAM

## 2024-03-20 PROCEDURE — 84146 ASSAY OF PROLACTIN: CPT | Mod: STJLAB | Performed by: INTERNAL MEDICINE

## 2024-03-20 PROCEDURE — 84439 ASSAY OF FREE THYROXINE: CPT | Performed by: INTERNAL MEDICINE

## 2024-03-20 PROCEDURE — 83935 ASSAY OF URINE OSMOLALITY: CPT | Mod: STJLAB | Performed by: STUDENT IN AN ORGANIZED HEALTH CARE EDUCATION/TRAINING PROGRAM

## 2024-03-20 PROCEDURE — 84443 ASSAY THYROID STIM HORMONE: CPT

## 2024-03-20 RX ORDER — MYCOPHENOLATE MOFETIL 250 MG/1
1000 CAPSULE ORAL 2 TIMES DAILY
Status: DISCONTINUED | OUTPATIENT
Start: 2024-03-20 | End: 2024-03-26 | Stop reason: HOSPADM

## 2024-03-20 RX ORDER — LANOLIN ALCOHOL/MO/W.PET/CERES
100 CREAM (GRAM) TOPICAL DAILY
Status: DISCONTINUED | OUTPATIENT
Start: 2024-03-20 | End: 2024-03-26 | Stop reason: HOSPADM

## 2024-03-20 RX ORDER — DEXTROSE 50 % IN WATER (D50W) INTRAVENOUS SYRINGE
25
Status: DISCONTINUED | OUTPATIENT
Start: 2024-03-20 | End: 2024-03-26 | Stop reason: HOSPADM

## 2024-03-20 RX ORDER — LEVETIRACETAM 5 MG/ML
500 INJECTION INTRAVASCULAR EVERY 12 HOURS
Status: DISCONTINUED | OUTPATIENT
Start: 2024-03-20 | End: 2024-03-22

## 2024-03-20 RX ORDER — AMLODIPINE BESYLATE 2.5 MG/1
2.5 TABLET ORAL DAILY
Status: DISCONTINUED | OUTPATIENT
Start: 2024-03-20 | End: 2024-03-23

## 2024-03-20 RX ORDER — FOLIC ACID 1 MG/1
1 TABLET ORAL DAILY
Status: DISCONTINUED | OUTPATIENT
Start: 2024-03-20 | End: 2024-03-26 | Stop reason: HOSPADM

## 2024-03-20 RX ORDER — PANTOPRAZOLE SODIUM 40 MG/1
40 TABLET, DELAYED RELEASE ORAL DAILY
Status: DISCONTINUED | OUTPATIENT
Start: 2024-03-20 | End: 2024-03-20

## 2024-03-20 RX ORDER — ENOXAPARIN SODIUM 150 MG/ML
1.5 INJECTION SUBCUTANEOUS EVERY 24 HOURS
Status: DISCONTINUED | OUTPATIENT
Start: 2024-03-20 | End: 2024-03-22

## 2024-03-20 RX ORDER — PANTOPRAZOLE SODIUM 40 MG/10ML
40 INJECTION, POWDER, LYOPHILIZED, FOR SOLUTION INTRAVENOUS DAILY
Status: DISCONTINUED | OUTPATIENT
Start: 2024-03-20 | End: 2024-03-25

## 2024-03-20 RX ORDER — DEXTROSE 50 % IN WATER (D50W) INTRAVENOUS SYRINGE
12.5
Status: DISCONTINUED | OUTPATIENT
Start: 2024-03-20 | End: 2024-03-26 | Stop reason: HOSPADM

## 2024-03-20 RX ORDER — RISPERIDONE 0.5 MG/1
0.5 TABLET, ORALLY DISINTEGRATING ORAL NIGHTLY
Status: DISCONTINUED | OUTPATIENT
Start: 2024-03-20 | End: 2024-03-20

## 2024-03-20 RX ORDER — DICLOFENAC SODIUM 10 MG/G
4 GEL TOPICAL 4 TIMES DAILY PRN
Status: DISCONTINUED | OUTPATIENT
Start: 2024-03-20 | End: 2024-03-26 | Stop reason: HOSPADM

## 2024-03-20 RX ORDER — MAGNESIUM SULFATE HEPTAHYDRATE 40 MG/ML
2 INJECTION, SOLUTION INTRAVENOUS ONCE
Status: COMPLETED | OUTPATIENT
Start: 2024-03-20 | End: 2024-03-20

## 2024-03-20 RX ORDER — IPRATROPIUM BROMIDE AND ALBUTEROL SULFATE 2.5; .5 MG/3ML; MG/3ML
3 SOLUTION RESPIRATORY (INHALATION) EVERY 6 HOURS PRN
Status: DISCONTINUED | OUTPATIENT
Start: 2024-03-20 | End: 2024-03-26 | Stop reason: HOSPADM

## 2024-03-20 RX ORDER — ATORVASTATIN CALCIUM 40 MG/1
40 TABLET, FILM COATED ORAL DAILY
Status: DISCONTINUED | OUTPATIENT
Start: 2024-03-20 | End: 2024-03-26 | Stop reason: HOSPADM

## 2024-03-20 RX ORDER — INSULIN LISPRO 100 [IU]/ML
0-5 INJECTION, SOLUTION INTRAVENOUS; SUBCUTANEOUS
Status: DISCONTINUED | OUTPATIENT
Start: 2024-03-20 | End: 2024-03-20

## 2024-03-20 RX ORDER — GADOTERATE MEGLUMINE 376.9 MG/ML
20 INJECTION INTRAVENOUS
Status: COMPLETED | OUTPATIENT
Start: 2024-03-20 | End: 2024-03-20

## 2024-03-20 RX ORDER — INSULIN LISPRO 100 [IU]/ML
0-10 INJECTION, SOLUTION INTRAVENOUS; SUBCUTANEOUS EVERY 4 HOURS
Status: DISCONTINUED | OUTPATIENT
Start: 2024-03-20 | End: 2024-03-25

## 2024-03-20 RX ORDER — LEVETIRACETAM 500 MG/1
500 TABLET ORAL EVERY 12 HOURS
Status: DISCONTINUED | OUTPATIENT
Start: 2024-03-20 | End: 2024-03-26 | Stop reason: HOSPADM

## 2024-03-20 RX ORDER — LEVETIRACETAM 500 MG/1
500 TABLET ORAL 2 TIMES DAILY
Status: DISCONTINUED | OUTPATIENT
Start: 2024-03-20 | End: 2024-03-20

## 2024-03-20 RX ORDER — PREDNISONE 20 MG/1
20 TABLET ORAL DAILY
Status: DISCONTINUED | OUTPATIENT
Start: 2024-03-20 | End: 2024-03-20

## 2024-03-20 RX ORDER — PREDNISONE 20 MG/1
20 TABLET ORAL DAILY
Status: DISCONTINUED | OUTPATIENT
Start: 2024-03-20 | End: 2024-03-26 | Stop reason: HOSPADM

## 2024-03-20 RX ORDER — ACETAMINOPHEN 500 MG
5 TABLET ORAL NIGHTLY PRN
Status: DISCONTINUED | OUTPATIENT
Start: 2024-03-20 | End: 2024-03-26 | Stop reason: HOSPADM

## 2024-03-20 RX ADMIN — INSULIN LISPRO 2 UNITS: 100 INJECTION, SOLUTION INTRAVENOUS; SUBCUTANEOUS at 06:49

## 2024-03-20 RX ADMIN — HYDROCORTISONE SODIUM SUCCINATE 50 MG: 100 INJECTION, POWDER, FOR SOLUTION INTRAMUSCULAR; INTRAVENOUS at 11:38

## 2024-03-20 RX ADMIN — MAGNESIUM SULFATE HEPTAHYDRATE 2 G: 40 INJECTION, SOLUTION INTRAVENOUS at 03:55

## 2024-03-20 RX ADMIN — GADOTERATE MEGLUMINE 20 ML: 376.9 INJECTION INTRAVENOUS at 22:16

## 2024-03-20 RX ADMIN — HYDROCORTISONE SODIUM SUCCINATE 50 MG: 100 INJECTION, POWDER, FOR SOLUTION INTRAMUSCULAR; INTRAVENOUS at 16:56

## 2024-03-20 RX ADMIN — HYDROCORTISONE SODIUM SUCCINATE 100 MG: 100 INJECTION, POWDER, FOR SOLUTION INTRAMUSCULAR; INTRAVENOUS at 05:16

## 2024-03-20 RX ADMIN — ENOXAPARIN SODIUM 150 MG: 150 INJECTION SUBCUTANEOUS at 15:21

## 2024-03-20 RX ADMIN — PANTOPRAZOLE SODIUM 40 MG: 40 INJECTION, POWDER, FOR SOLUTION INTRAVENOUS at 14:17

## 2024-03-20 RX ADMIN — LEVETIRACETAM 500 MG: 5 INJECTION INTRAVENOUS at 15:21

## 2024-03-20 RX ADMIN — HYDROCORTISONE SODIUM SUCCINATE 50 MG: 100 INJECTION, POWDER, FOR SOLUTION INTRAMUSCULAR; INTRAVENOUS at 23:30

## 2024-03-20 SDOH — SOCIAL STABILITY: SOCIAL INSECURITY: DOES ANYONE TRY TO KEEP YOU FROM HAVING/CONTACTING OTHER FRIENDS OR DOING THINGS OUTSIDE YOUR HOME?: UNABLE TO ASSESS

## 2024-03-20 SDOH — SOCIAL STABILITY: SOCIAL INSECURITY: HAVE YOU HAD THOUGHTS OF HARMING ANYONE ELSE?: NO

## 2024-03-20 SDOH — SOCIAL STABILITY: SOCIAL INSECURITY: DO YOU FEEL ANYONE HAS EXPLOITED OR TAKEN ADVANTAGE OF YOU FINANCIALLY OR OF YOUR PERSONAL PROPERTY?: UNABLE TO ASSESS

## 2024-03-20 SDOH — SOCIAL STABILITY: SOCIAL INSECURITY: ABUSE: ADULT

## 2024-03-20 SDOH — SOCIAL STABILITY: SOCIAL INSECURITY: ARE YOU OR HAVE YOU BEEN THREATENED OR ABUSED PHYSICALLY, EMOTIONALLY, OR SEXUALLY BY ANYONE?: UNABLE TO ASSESS

## 2024-03-20 SDOH — SOCIAL STABILITY: SOCIAL INSECURITY: HAS ANYONE EVER THREATENED TO HURT YOUR FAMILY OR YOUR PETS?: UNABLE TO ASSESS

## 2024-03-20 SDOH — SOCIAL STABILITY: SOCIAL INSECURITY: DO YOU FEEL UNSAFE GOING BACK TO THE PLACE WHERE YOU ARE LIVING?: UNABLE TO ASSESS

## 2024-03-20 SDOH — SOCIAL STABILITY: SOCIAL INSECURITY: ARE THERE ANY APPARENT SIGNS OF INJURIES/BEHAVIORS THAT COULD BE RELATED TO ABUSE/NEGLECT?: UNABLE TO ASSESS

## 2024-03-20 SDOH — SOCIAL STABILITY: SOCIAL INSECURITY: WERE YOU ABLE TO COMPLETE ALL THE BEHAVIORAL HEALTH SCREENINGS?: NO

## 2024-03-20 ASSESSMENT — PAIN SCALES - GENERAL
PAINLEVEL_OUTOF10: 7
PAINLEVEL_OUTOF10: 0 - NO PAIN

## 2024-03-20 ASSESSMENT — ACTIVITIES OF DAILY LIVING (ADL)
PATIENT'S MEMORY ADEQUATE TO SAFELY COMPLETE DAILY ACTIVITIES?: UNABLE TO ASSESS
WALKS IN HOME: DEPENDENT
LACK_OF_TRANSPORTATION: PATIENT UNABLE TO ANSWER
BATHING: DEPENDENT
FEEDING YOURSELF: DEPENDENT
HEARING - RIGHT EAR: FUNCTIONAL
ADEQUATE_TO_COMPLETE_ADL: UNABLE TO ASSESS
HEARING - LEFT EAR: FUNCTIONAL
DRESSING YOURSELF: DEPENDENT
JUDGMENT_ADEQUATE_SAFELY_COMPLETE_DAILY_ACTIVITIES: UNABLE TO ASSESS
GROOMING: DEPENDENT
TOILETING: DEPENDENT

## 2024-03-20 ASSESSMENT — COGNITIVE AND FUNCTIONAL STATUS - GENERAL
STANDING UP FROM CHAIR USING ARMS: A LOT
TOILETING: A LOT
DRESSING REGULAR UPPER BODY CLOTHING: A LOT
DRESSING REGULAR LOWER BODY CLOTHING: A LOT
MOVING FROM LYING ON BACK TO SITTING ON SIDE OF FLAT BED WITH BEDRAILS: A LOT
DAILY ACTIVITIY SCORE: 12
MOBILITY SCORE: 12
TOILETING: A LOT
EATING MEALS: A LOT
DRESSING REGULAR LOWER BODY CLOTHING: A LOT
STANDING UP FROM CHAIR USING ARMS: A LOT
WALKING IN HOSPITAL ROOM: A LOT
MOVING TO AND FROM BED TO CHAIR: A LOT
PERSONAL GROOMING: A LOT
DAILY ACTIVITIY SCORE: 12
TURNING FROM BACK TO SIDE WHILE IN FLAT BAD: A LOT
MOBILITY SCORE: 12
MOVING TO AND FROM BED TO CHAIR: A LOT
CLIMB 3 TO 5 STEPS WITH RAILING: A LOT
HELP NEEDED FOR BATHING: A LOT
EATING MEALS: A LOT
WALKING IN HOSPITAL ROOM: A LOT
HELP NEEDED FOR BATHING: A LOT
PATIENT BASELINE BEDBOUND: UNABLE TO ASSESS AT THIS TIME
DRESSING REGULAR UPPER BODY CLOTHING: A LOT
CLIMB 3 TO 5 STEPS WITH RAILING: A LOT
PERSONAL GROOMING: A LOT
TURNING FROM BACK TO SIDE WHILE IN FLAT BAD: A LOT
MOVING FROM LYING ON BACK TO SITTING ON SIDE OF FLAT BED WITH BEDRAILS: A LOT

## 2024-03-20 ASSESSMENT — LIFESTYLE VARIABLES
HOW MANY STANDARD DRINKS CONTAINING ALCOHOL DO YOU HAVE ON A TYPICAL DAY: PATIENT UNABLE TO ANSWER
AUDIT-C TOTAL SCORE: -1
SKIP TO QUESTIONS 9-10: 0
AUDIT-C TOTAL SCORE: -1
HOW OFTEN DO YOU HAVE A DRINK CONTAINING ALCOHOL: PATIENT UNABLE TO ANSWER
HOW OFTEN DO YOU HAVE 6 OR MORE DRINKS ON ONE OCCASION: PATIENT UNABLE TO ANSWER

## 2024-03-20 ASSESSMENT — PATIENT HEALTH QUESTIONNAIRE - PHQ9
SUM OF ALL RESPONSES TO PHQ9 QUESTIONS 1 & 2: 0
1. LITTLE INTEREST OR PLEASURE IN DOING THINGS: NOT AT ALL
2. FEELING DOWN, DEPRESSED OR HOPELESS: NOT AT ALL

## 2024-03-20 NOTE — CONSULTS
Endocrinology Initial Inpatient Consult Note     PATIENT NAME: Bing Holliday  MRN: [id number]  DATE: 3/20/2024    CONSULTING PHYSICIAN: Dr. Yolanda Moreno.  REASON FOR CONSULT: AI. T2DM.      History of Presenting Illness     62y F w. PMHx of:      # Systemic Lupus Erythematous c/b Cerebritis and Jaccoud's Arthropathy      # Uncontrolled T2DM      # Hx of C Difficile Infection (5/23)      # Stage IV CKD      # Paroxysmal Atrial Fibrillation      # Heart Failure with Reduced LVEF      # Osteoporosis      # GERD      # COPD      # pHTN      # HTN      # HLD    Most this history is taken via chart review given that patient cannot contribute to history due to her mental status.  Patient reportedly come from home complaining of weakness.  She is brought in by EMS.  Reports her patient's son is her power of  and that the patient was started to get more more weak and he noted that this was usually what happens when she has crises and needs to come to the hospital.  I was unable to obtain number of systems as above due to her mental status.    In the ER, nucleic acid medication test for SARS-CoV-2 as well as flu was negative.  Patient's troponin was found to be 23.  CBC showed a normocytic hypochromic anemia with a hemoglobin of 8.7 g/dL.  CMP showed mildly elevated glucose 185 mg/dL.  The patient's creatinine was elevated to 1.96.  BNP was found to be 106.  ESR was elevated to 59.  TSH was drawn was found be 1.29.  CRP was elevated to 1.7.  B12 is pending.  Patient a urinalysis which showed negative nitrite but 1+ leukocyte Estrace.  The patient did have greater than 50 WBCs.  Her dsDNA antibody was negative.  Blood cultures are pending.  Ammonia level was found to be 22.  Lactate level was found to be 0.7.  The patient was started on IV hydrocortisone and subsequent mated to the medical unit.  She did have a CT of the head which showed no acute intracranial abnormality.  Plain radiograph of the chest was with  "low lung volumes and showed mild pulmonary vascular congestion.      Review of Systems (Bold if Positive)     Unable to Obtain 2/2 Mental Status.      Physical Examination     /68 (Patient Position: Lying)   Pulse 64   Temp 34.1 °C (93.3 °F) (Rectal)   Resp 12   Ht 1.676 m (5' 5.98\")   Wt 96.5 kg (212 lb 11.9 oz)   LMP  (LMP Unknown)   SpO2 94%   BMI 34.35 kg/m²   General: No acute distress. A&Ox1 (self).  Temporal wasting.  Difficult to arouse.  HEENT: PERRLA. EOMi. Ø Exophthalmos   Neck: No LAD.  Thyroid: 20g Ø Bruit  CV: Normal rate and rhythm. No murmurs or rubs auscultated.   Resp: CTA b/l. No wheezing or crackles.   Abdomen: Generalized abdominal pain.  No rebound tenderness.  Extremities: Trace pedal edema b/l.  Neuro: CN II-XII Grossly Intact. Ø Tremor. Brisk Reflexes.   Psych: Somnolent.  Skin: No apparent rashes      Past Medical / Surgical / Family / Social History     Past Medical History:   Diagnosis Date    Abnormal kidney function 04/04/2023    Acute headache 03/10/2024    Acute low back pain 10/30/2023    Acute upper respiratory infection, unspecified 03/04/2020    Acute URI    Acute upper respiratory infection, unspecified 09/30/2015    URTI (acute upper respiratory infection)    Arthritis     Atypical facial pain 03/10/2024    Body mass index (BMI) 23.0-23.9, adult 10/15/2021    BMI 23.0-23.9, adult    Body mass index (BMI) 33.0-33.9, adult 03/04/2020    BMI 33.0-33.9,adult    Cardiomegaly 08/27/2013    Left ventricular hypertrophy    Chest pain 06/10/2022    Comment on above: Added by Problem List Migration; 2013-3-12; Moved to Suppressed Nov 25 2013 9:16PM; Comment on above: Added by Problem List Migration; 2013-3-12; Moved to Suppressed Nov 25 2013 9:16PM;    Chronic kidney disease, stage 3 unspecified (CMS/HCC) 07/02/2013    Chronic kidney disease, stage III (moderate)    Confusional state 03/10/2024    Contact with and (suspected) exposure to covid-19 04/04/2023    Delirium " 03/10/2024    Disease of pericardium, unspecified 07/02/2013    Pericardial disease    Encounter for follow-up examination after completed treatment for conditions other than malignant neoplasm 10/06/2022    Hospital discharge follow-up    Generalized contraction of visual field, right eye 01/29/2015    Generalized contraction of visual field of right eye    Hearing loss 03/10/2024    History of cataract 03/10/2024    History of thrombocytopenia 03/10/2024    Comment on above: Added by Problem List Migration; 2013-7-2;    Homonymous bilateral field defects, right side 04/29/2016    Homonymous bilateral field defects of right side    Hypertensive chronic kidney disease with stage 1 through stage 4 chronic kidney disease, or unspecified chronic kidney disease 07/02/2013    Nephrosclerosis    Laceration without foreign body, left foot, initial encounter 07/03/2018    Foot laceration, left, initial encounter    Localized edema 03/10/2024    Mass of skin 03/10/2024    Migraine with aura, not intractable, without status migrainosus 10/24/2022    Ocular migraine    Open wound 03/10/2024    Open wound of left foot 03/10/2024    Other conditions influencing health status 07/02/2013    Chronic Glomerulonephritis In Diseases Classified Elsewhere    Other conditions influencing health status 07/02/2013    Progressive Familial Myoclonic Epilepsy    Other conditions influencing health status 07/02/2013    Protein S Deficiency    Other conditions influencing health status 05/22/2015    Familial Combined Hyperlipidemia    Other conditions influencing health status 10/24/2022    IDDM (insulin dependent diabetes mellitus)    Other conditions influencing health status 03/14/2022    Diabetes mellitus, insulin dependent (IDDM), uncontrolled    Other long term (current) drug therapy 10/24/2022    Long-term use of Plaquenil    Overweight with body mass index (BMI) 25.0-29.9 03/10/2024    Pain of toe 03/10/2024    Personal history of  COVID-19 04/04/2023    Personal history of diseases of the blood and blood-forming organs and certain disorders involving the immune mechanism 07/02/2013    History of thrombocytopenia    Personal history of diseases of the skin and subcutaneous tissue 08/11/2015    History of foot ulcer    Personal history of nephrotic syndrome 07/02/2013    History of nephrotic syndrome    Personal history of other diseases of the circulatory system 08/27/2013    History of sinus tachycardia    Personal history of other diseases of the nervous system and sense organs     History of cataract    Personal history of other diseases of the respiratory system     History of bronchitis    Personal history of other infectious and parasitic diseases 07/02/2013    History of hepatitis    Personal history of other specified conditions     History of shortness of breath    Personal history of other specified conditions 08/27/2013    History of edema    Posterior epistaxis 03/10/2024    Puckering of macula, right eye 10/24/2022    ERM OD (epiretinal membrane, right eye)    Raynaud's syndrome without gangrene 07/02/2013    Raynaud's disease    Sinusitis 03/10/2024    Systemic lupus erythematosus, unspecified (CMS/HCC) 07/24/2015    SLE (systemic lupus erythematosus)    Systemic lupus erythematosus, unspecified (CMS/HCC) 07/24/2015    SLE (systemic lupus erythematosus)    Systemic lupus erythematosus, unspecified (CMS/[facility]) 07/24/2015    Systemic lupus    Type 2 diabetes mellitus with diabetic nephropathy (CMS/HCC) 07/02/2013    Type 2 diabetes with nephropathy    Type 2 diabetes mellitus with mild nonproliferative diabetic retinopathy without macular edema, left eye (CMS/HCC) 07/27/2015    Non-proliferative diabetic retinopathy, left eye    Type 2 diabetes mellitus with mild nonproliferative diabetic retinopathy without macular edema, unspecified eye (CMS/HCC) 07/24/2015    Mild non proliferative diabetic retinopathy    Type 2 diabetes mellitus  with proliferative diabetic retinopathy without macular edema, right eye (CMS/[facility]) 07/27/2015    Proliferative diabetic retinopathy of right eye    Type 2 diabetes mellitus with proliferative diabetic retinopathy without macular edema, unspecified eye (CMS/[facility]) 07/24/2015    Diabetic proliferative retinopathy    Unspecified acute and subacute iridocyclitis 07/24/2015    Acute iritis, right eye    Unspecified open wound, left foot, sequela 07/03/2018    Wound, open, foot, left, sequela     Past Surgical History:   Procedure Laterality Date    ANKLE SURGERY  01/29/2015    Ankle Surgery    CHOLECYSTECTOMY  01/29/2015    Cholecystectomy    CT GUIDED PERCUTANEOUS BIOPSY BONE DEEP  5/4/2021    CT GUIDED PERCUTANEOUS BIOPSY BONE DEEP 5/4/2021 [facility] CLINICAL LEGAC[facility]    EYE SURGERY  03/06/2015    Eye Surgery    FOOT SURGERY  01/29/2015    Foot Surgery    MR HEAD ANGIO WO IV CONTRAST  7/26/2013    MR HEAD ANGIO WO IV CONTRAST 7/26/2013 [facility] CLINICAL LEGACY    MR HEAD ANGIO WO IV CONTRAST  9/17/2021    MR HEAD ANGIO WO IV CONTRAST 9/17/2021 [facility]    MR HEAD ANGIO WO IV CONTRAST  3/25/2023    MR HEAD ANGIO WO IV CONTRAST [facility]    MR NECK ANGIO WO IV CONTRAST  7/26/2013    MR NECK ANGIO WO IV CONTRAST 7/26/2013 [facility] CLINICAL LEGACY    MR NECK ANGIO WO IV CONTRAST  9/17/2021    MR NECK ANGIO WO IV CONTRAST 9/17/2021 [facility]    MR NECK ANGIO WO IV CONTRAST  3/25/2023    MR NECK ANGIO WO IV CONTRAST [facility]    OTHER SURGICAL HISTORY  01/29/2015    Creation Of Pericardial Window    OTHER SURGICAL HISTORY  01/29/2015    Quadricepsplasty    TOTAL HIP ARTHROPLASTY  01/29/2015    Hip Replacement     Family History   Problem Relation Name Age of Onset    Other (RENAL DISEASE) Mother          END STAGE    Other (CARDIAC DISORDER) Mother      Cataracts Mother      Stroke Mother      Diabetes Mother      Kidney disease Mother      Lupus Mother      Other (CARDIAC DISORDER) Father      COPD Father      Glaucoma  Father      Hypertension Father      Sleep apnea Father      Other (CARDIAC DISORDER) Sister      Depression Sister      Kidney disease Sister      Sickle cell trait Sister      Sleep apnea Daughter       Social History     Socioeconomic History    Marital status:      Spouse name: Not on file    Number of children: Not on file    Years of education: Not on file    Highest education level: Not on file   Occupational History    Not on file   Tobacco Use    Smoking status: Never     Passive exposure: Never    Smokeless tobacco: Never   Vaping Use    Vaping Use: Never used   Substance and Sexual Activity    Alcohol use: Not Currently     Comment: RARE    Drug use: Never    Sexual activity: Defer   Other Topics Concern    Not on file   Social History Narrative    Not on file     Social Determinants of Health     Financial Resource Strain: Low Risk  (3/9/2024)    Overall Financial Resource Strain (CARDIA)     Difficulty of Paying Living Expenses: Not hard at all   Food Insecurity: Patient Unable To Answer (3/9/2024)    Hunger Vital Sign     Worried About Running Out of Food in the Last Year: Patient unable to answer     Ran Out of Food in the Last Year: Patient unable to answer   Transportation Needs: No Transportation Needs (3/9/2024)    PRAPARE - Transportation     Lack of Transportation (Medical): No     Lack of Transportation (Non-Medical): No   Physical Activity: Patient Declined (1/15/2024)    Exercise Vital Sign     Days of Exercise per Week: Patient declined     Minutes of Exercise per Session: Patient declined   Stress: No Stress Concern Present (1/15/2024)    [employer] [place] [employer] - Occupational Stress Questionnaire     Feeling of Stress : Not at all   Social Connections: Unknown (1/15/2024)    Social Connection and Isolation Panel [NHANES]     Frequency of Communication with Friends and Family: More than three times a week     Frequency of Social Gatherings with Friends and Family:  "More than three times a week     Attends [employer] Services: Patient declined     Active Member of Clubs or Organizations: Patient declined     Attends Club or Organization Meetings: Patient declined     Marital Status: Patient unable to answer   Intimate Partner Violence: Not At Risk (1/15/2024)    Humiliation, Afraid, Rape, and Kick questionnaire     Fear of Current or Ex-Partner: No     Emotionally Abused: No     Physically Abused: No     Sexually Abused: No   Housing Stability: Low Risk  (3/9/2024)    Housing Stability Vital Sign     Unable to Pay for Housing in the Last Year: No     Number of Places Lived in the Last Year: 1     Unstable Housing in the Last Year: No       Medications & Allergies     MAR and PTA Meds Reviewed     Allergies   Allergen Reactions    Ace Inhibitors Swelling, Angioedema, Shortness of breath and Other     'FACIAL TWISTING\" \"LOOKS LIKE I HAD A STROKE IN MY SLEEP\"    Hydroxychloroquine Other, Nausea And Vomiting, Nausea/vomiting and Unknown     RETINAL BLEEDING    RETINAL BLEEDING      RETINAL BLEEDING, Eye problems    Lisinopril Swelling    Penicillins Unknown     tolerates cephalosporins-unknown childhood allergy    Sulfa (Sulfonamide Antibiotics) Hives       Data     Recent Labs and Imaging Reviewed      Assessment / Plan       # Adrenal Insufficiency  As per my initial consult and progress notes from my multiple occasions seeing this patient.  The patient did present from home and she had hypothermia and bradycardia.  I did explain with Dr. Moreno who she was recently admitted under about her adrenal issues.  I am unaware of any adrenal issues which can lead to hypothermia and bradycardia.  Nonetheless, the patient has responded nicely to glucocorticoids in the past.  She was actually doing quite well on prednisone 20 mg before she was discharged.  I do wonder if the patient is taking her medication.  She has been restarted on IV hydrocortisone which is not unreasonable.  The " leading differential for this patient's adrenal insufficiency secondary adrenal insufficiency due to prolonged glucocorticoid use.  20 mg of prednisone is supraphysiologic and should be enough to keep her at bay.  In the past when I have interviewed her about how much prednisone she takes she waffles with the dose and does not know.    # Hypoglycemia  She was hyperglycemic on arrival.  She has presented with hypoglycemia couple times this year.  During her last hospitalization this did not recur.    # Uncontrolled Type 2 Diabetes Mellitus  Home Regimen: None.  Hemoglobin A1c (7/23): 6.9%  Nutrtion: Regular Diet.    She tends to get hyperglycemic on high doses of steroids.  I think sliding scale insulin with meals and at bedtime is reasonable for now.    # Osteoporosis  In the setting of chronic glucocorticoid use.  This will be further managed as an outpatient.  She probably would benefit from a bisphosphonate, these agents are best studied with steroid-induced adynamic bone disease.       Avi Sloan, DO  Endocrinology, Diabetes, and Metabolism    Available via [employer] Messenger    Please excuse and typographical or unwanted errors within this documentation as voice recognition software was used to dictate this note.

## 2024-03-20 NOTE — PROGRESS NOTES
Physical Therapy                 Therapy Communication Note    Patient Name: Bing Holliday  MRN: [id number]  Today's Date: 3/20/2024     Discipline: Physical Therapy    Missed Visit Reason: Attempted to see pt for PT eval.  Spoke with RN whom reports pt is not talking or following commands.  Pt on [facility] Hucaridader.  Pt is not appropriate for PT intervention.  Will attempt again later as appropriate.     Missed Time: Attempt

## 2024-03-20 NOTE — TELEPHONE ENCOUNTER
Tried calling Iris back and it went to , did not leave a message [phone]    Called [facility]  [phone] and spoke with Kinza and she will relay this message to Iris

## 2024-03-20 NOTE — H&P
History Of Present Illness  Bing Holliday is a 63 y.o. 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 Patient's son states that he noticed her becoming progressively weak on Friday 3/15/2024. On exam, patient is significantly altered and is unable to stay awake or participate in obtaining historical information. Attempted to call son at time of admission, but did not answer. Will attempt again later.    Recently admitted to the hospital on 3/9/2024 for hypoglycemia, hypothermia that was a suspected adrenal insufficiency where she was treated with hydrocortisone stress steroids.  Endocrinology was consulted at that time who recommended discharging patient on 20 mg prednisone to be taken once daily.    In the ED: Vitals were 98.2 T, 64 HR, 20 RR, 122/75 mmhg, 99% on RA. Labs showed a glucose of 185, creatinine 1.96, hemoglobin 8.7, and troponin 22 and 23 trended.  CXR shows mild pulmonary vascular congestion. No treatments provided.     Past Medical History  As above    Surgical History  She has a past surgical history that includes Eye surgery (03/06/2015); Total hip arthroplasty (01/29/2015); Cholecystectomy (01/29/2015); Other surgical history (01/29/2015); Other surgical history (01/29/2015); Ankle surgery (01/29/2015); Foot surgery (01/29/2015); MR angio head wo IV contrast (7/26/2013); MR angio neck wo IV contrast (7/26/2013); CT guided percutaneous biopsy bone deep (5/4/2021); MR angio head wo IV contrast (9/17/2021); MR angio neck wo IV contrast (9/17/2021); MR angio neck wo IV contrast (3/25/2023); and MR angio head wo IV contrast (3/25/2023).     Social History  She reports that she has never smoked. She has never been exposed to tobacco smoke. She has never used smokeless tobacco. She reports that she  does not currently use alcohol. She reports that she does not use drugs.    Family History  Family History   Problem Relation Name Age of Onset    Other (RENAL DISEASE) Mother          END STAGE    Other (CARDIAC DISORDER) Mother      Cataracts Mother      Stroke Mother      Diabetes Mother      Kidney disease Mother      Lupus Mother      Other (CARDIAC DISORDER) Father      COPD Father      Glaucoma Father      Hypertension Father      Sleep apnea Father      Other (CARDIAC DISORDER) Sister      Depression Sister      Kidney disease Sister      Sickle cell trait Sister      Sleep apnea Daughter          Allergies  Ace inhibitors, Hydroxychloroquine, Lisinopril, Penicillins, and Sulfa (sulfonamide antibiotics)    Review of Systems   ROS: 12 systems reviewed and negative except per HPI above     Physical Exam by System:    Constitutional: Easily awakes to vocal stimulus however difficult to maintain alertness, did not have conversation   Head and Face: Atraumatic, normocephalic   Eyes: Normal external exam, EOMI  ENT: Normal external inspection of ears and nose. Oropharynx normal.  Cardiovascular: intermittently bradycardic but regular rhythm, S1/S2, no murmurs, rubs, or gallops, radial pulses +2  Pulmonary: CTAB, no respiratory distress, no wheezing, rales or rhonchi, on RA  Abdomen: +BS, soft, non-tender, nondistended, no guarding rigidity or rebound tenderness, no masses noted  MSK: Pitting edema in lower extremities up to knees b/l, No joint swelling, normal movements of all extremities.   Neuro: Pt does not participate or cooperate with neurological exam. Is moving all extremities freely, but does not follow directions.  Skin- No lesions, contusions, or erythema. No obvious open wounds  Psychiatric: Judgment intact. Appropriate mood, affect and behavior      Last Recorded Vitals  Blood pressure 143/77, pulse (!) 49, temperature 36.2 °C (97.2 °F), temperature source Temporal, resp. rate 14, height 1.676 m (5'  "5.98\"), weight 96.5 kg (212 lb 11.9 oz), SpO2 96 %.    Relevant Results  Results for orders placed or performed during the hospital encounter of 03/19/24 (from the past 24 hour(s))   Sars-CoV-2 and Influenza A/B PCR   Result Value Ref Range    Flu A Result Not Detected Not Detected    Flu B Result Not Detected Not Detected    Coronavirus 2019, PCR Not Detected Not Detected   CBC and Auto Differential   Result Value Ref Range    WBC 6.3 4.4 - 11.3 x10*3/uL    nRBC 0.8 (H) 0.0 - 0.0 /100 WBCs    RBC 2.93 (L) 4.00 - 5.20 x10*6/uL    Hemoglobin 8.7 (L) 12.0 - 16.0 g/dL    Hematocrit 29.1 (L) 36.0 - 46.0 %    MCV 99 80 - 100 fL    MCH 29.7 26.0 - 34.0 pg    MCHC 29.9 (L) 32.0 - 36.0 g/dL    RDW 19.8 (H) 11.5 - 14.5 %    Platelets 124 (L) 150 - 450 x10*3/uL    Neutrophils % 88.9 40.0 - 80.0 %    Immature Granulocytes %, Automated 1.4 (H) 0.0 - 0.9 %    Lymphocytes % 6.5 13.0 - 44.0 %    Monocytes % 3.0 2.0 - 10.0 %    Eosinophils % 0.2 0.0 - 6.0 %    Basophils % 0.0 0.0 - 2.0 %    Neutrophils Absolute 5.61 1.20 - 7.70 x10*3/uL    Immature Granulocytes Absolute, Automated 0.09 0.00 - 0.70 x10*3/uL    Lymphocytes Absolute 0.41 (L) 1.20 - 4.80 x10*3/uL    Monocytes Absolute 0.19 0.10 - 1.00 x10*3/uL    Eosinophils Absolute 0.01 0.00 - 0.70 x10*3/uL    Basophils Absolute 0.00 0.00 - 0.10 x10*3/uL   Comprehensive Metabolic Panel   Result Value Ref Range    Glucose 185 (H) 74 - 99 mg/dL    Sodium 143 136 - 145 mmol/L    Potassium 5.2 3.5 - 5.3 mmol/L    Chloride 109 (H) 98 - 107 mmol/L    Bicarbonate 25 21 - 32 mmol/L    Anion Gap 14 10 - 20 mmol/L    Urea Nitrogen 51 (H) 6 - 23 mg/dL    Creatinine 1.96 (H) 0.50 - 1.05 mg/dL    eGFR 28 (L) >60 mL/min/1.73m*2    Calcium 9.2 8.6 - 10.3 mg/dL    Albumin 3.4 3.4 - 5.0 g/dL    Alkaline Phosphatase 121 33 - 136 U/L    Total Protein 6.4 6.4 - 8.2 g/dL    AST 32 9 - 39 U/L    Bilirubin, Total 0.4 0.0 - 1.2 mg/dL    ALT 24 7 - 45 U/L   Magnesium   Result Value Ref Range    Magnesium " 1.73 1.60 - 2.40 mg/dL   Troponin I, High Sensitivity, Initial   Result Value Ref Range    Troponin I, High Sensitivity 22 (H) 0 - 13 ng/L   B-type natriuretic peptide   Result Value Ref Range     (H) 0 - 99 pg/mL   Troponin, High Sensitivity, 1 Hour   Result Value Ref Range    Troponin I, High Sensitivity 23 (H) 0 - 13 ng/L      Scheduled medications    Continuous medications    PRN medications       XR chest 1 view    Result Date: 3/19/2024  Interpreted By:  Jeri Smith, STUDY: XR CHEST 1 VIEW;  3/19/2024 10:49 pm   INDICATION: Signs/Symptoms:gen weakness, inc leg edema.   COMPARISON: Radiographs of the chest dated 01/17/2024   ACCESSION NUMBER(S): [id number]   ORDERING CLINICIAN: IRINEO VARGAS   FINDINGS: AP radiograph of the chest was provided.   Previously visualized enteric tube has been removed.   CARDIOMEDIASTINAL SILHOUETTE: Cardiomediastinal silhouette is mildly enlarged, stable in appearance to prior exam.   LUNGS: Somewhat low lung volumes are present with mild pulmonary vascular congestion. No consolidation or sizable pleural effusion is evident.   ABDOMEN: No remarkable upper abdominal findings.   BONES: No acute osseous changes.       1.  Somewhat low lung volumes with mild pulmonary vascular congestion. No consolidation or sizable pleural effusion is evident.       MACRO: None   Signed by: Jeri Smith 3/19/2024 10:53 PM Dictation workstation:   [id number]        Assessment/Plan   Principal Problem:    Generalized weakness    Patient is a 63 y.o. 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 Patient's son states that he noticed her becoming progressively weak on Friday 3/15/2024. No signs of adrenal insufficiency within out data as patient is hemodynamically  stable and normo-glycemic; however patient is significantly altered with an unknown etiology at this point. Questionable JED as Cr is 1.9, but patient's baseline is anywhere from 1.5-1.9, she is volume up so she will benefit from diuresis with possible cardiorenal component.     1.) AMS  2.) Generalized weakness  -Concern for potential developing adrenal insufficiency vs lupus flare, patient may also be fluid overloaded  -Flu/COVID/RSV negative  -CXR showing mild vascular congestion  -STAT CT head w/o contrast  -UA pending  -Bcx pending although lower concern for systemic infection  -Bladder scan  -Ammonia  -TSH w/ reflex  -CRP, ESR  -Consider stress doing steroids if no improvement in mental status  -PT/OT  -NPO until nursing bedside swallow due to AMS    #Secondary adrenal insufficiency  #IDDMT2 w/ history of hypoglycemia  -Continue Prednisone 20mg daily  -If hypoglycemic: order C-peptide and beta hydroxybutyrate prior to hypoglycemic protocol, also recheck with rfp  -Insulin sliding scale    #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 Especially with current AMS    #Atrial fibrillation on Eliquis  -Continue home Eliquis 2.5 mg twice daily  -Electrolytes optimized    #HFrEF  #Lower extremity swelling, chronic  -TTE 3/11/2024: LVEF 40 to 45% and low normal RV systolic function.  Mild/moderate MR.  Moderately elevated RVSP of 49.5, global hypokinesis of left ventricle with minor regional variations     #History of seizures  - Continue home Keppra 500 mg twice daily     #COPD not in exacerbation  - DuoNebs every 6 hours prn    DVT: eliquis  Diet: NPO until nursing swallow study  Consults:  CODE: FULL    Disposition: Patient admitted for generalized weakness and AMS. Anticipate 2-3 night stay    To be staffed with attending,  Lauri Silverio D.O,  PGY-1  ------------------------  Senior  resident addendum:    Patient is a 63-year-old female presenting from home with several days of weakness.  Per EMS attending conversation with patient's son, López (DEVANTE), since Friday, 3/15/2024 patient has been progressively weak at home.  Overnight, attempted to call López at [phone], however son did not  phone.  Per documentation, she has denied fevers, chills, night sweats, chest pain, shortness of breath, abdominal pain or nausea and vomiting. She has a past medical history significant for secondary adrenal insufficiency likely in the setting of chronic steroid use and immunotherapy, IDDM T2, A-fib on Eliquis, lupus cerebritis and Andrew arthropathy on MMF and prednisone (20mg), history of seizures, COPD, CKD3 (bl Cr 1.9), GERD, CAD s/p CABG 2013, hx of AAA. It should be noted, that patient out reach from  has contacted the patient 3 times as it relates to enrollment in (healthy at home program) without success.    Of note, [place] [facility] [place] secondary to suspected adrenal insufficiency crisis.  She was evaluated by endocrinology at this time with recommendations to continue prednisone 20 mg daily.  At the time of discharge, she was bradycardic with temperature of 95, though blood pressure stable.  Endocrinology felt patient was medically stable for discharge on the prednisone 20 mg daily.  Etiology of adrenal sufficiency is at least in the setting of secondary given glucocorticoid use, however etiology in total is unclear.  Previous MRIs have not shown any hypothalamic lesions or other lesions and temperature controlling areas of the brain.  Endocrinologist also mentioned that as it pertains to any hypoglycemic events moving forward, beta hydroxybutyrate as well as C-peptide should be checked as well as confirming hypoglycemia using chemistry panel and not peripheral finger Accu-Cheks.    She arrived to the ED temperature 98.2, pulse 64, respirations 20, BP  122/75 saturating 99% on room air.  CBC without leukocytosis, though presence of left shift of 1.4.  Stable anemia with hemoglobin 8.7 and platelets above baseline at 124.  CMP with stable electrolytes.  Elevated BUN to creatinine of 51 and 1.96 respectively (1.34 on discharge 3/14/2024, though baseline around 1.8-1.9).  No hepatic derangement.  Mildly elevated BNP of 106.  Troponin 22, repeat 23.  Influenza and COVID-19 negative.  CXR with low lung volumes and mild pulmonary vascular congestion without consolidation, pleural effusion or pneumothorax.    On exam, she is saturating appropriately on room air.  Lungs are CTAB and heart sounds without murmurs.  Abdomen soft nontender to palpation and bowel sounds appreciated.  3+ bilateral pitting edema to the knee.  She is somnolent, though awakens when her name is called.  She is able to keep her eyes open for a limited amount of time before she places her head back on the pillow and no longer responds to questioning. Minimally follows basic commands. She is endorsing extreme fatigue, but denies any pain.  She does not answer any other questions at this time given fatigue.    #AMS with weakness  #C/f ?developing AI?  -Afebrile/not hypothermic and no leukocytosis. No hypoglycemia. No hypotension. Interval development of bradycardia ([id number]). No indication to give stress dose steroids at this time with c/f AI crisis. Flu and Covid negative  -CXR with mild vascular congestion  -Will order bladder scan with UA and head CT w/o   -Pending: CRP, ESR, Anti-dsDNA, ammonia, blood cultures, TSH, b12   -To consider other infectious etiology, can consider CT C/A/P for further evaluation, though holding off overnight given stable vitals (aside from possible development of hypothermia and developing bradycardia)  -Attempted to contact López LOW, though did not answer overnight     Other comorbid conditions managed as stated above    Yassine Win, DO  PGY-2 Internal  "Medicine      ---------------------Interval history since original admission---------------------  3/20/2024 0440: notified of rectal temperature of 31.7C. Pulse 54, RR 16, 139/81, 97% RA  -Pt placed on jacqueline hugger  -given solucortef load of 100mg IV, to continue on 50mg q6hr after loading dose  -endocrinology, Dr. Sloan consulted  -straight cathed for bladder scan of 400cc  -STAT rfp to evaluate for hypoglycemia -- should she become hypoglycemic \"confirm hypoglycemia using a chemistry before treatment. If she does have low sugars, please obtain a beta hydroxybutyrate as well as C-peptide\" per Dr. Sloan's recommendations  -Spoke to López, son -- pt has been compliant with steroids and other medications daily, as he has been administering them to her personally. No URI symptoms, complaints of belly pain, dysuria. Has been very fatigued since 3/15 and weak. López states that home nurse recently contacted a doctor regarding diuretic 2/2 pt having increased weeping in her lower extremities. No medication changes to any meds have been made since last admission.     ------  DAY TEAM ADDENDUM  Patient examined bedside no family was present this morning.  On physical exam she was tired but easily arousable and uncooperative with questioning.  Cardiac and pulmonary exam unremarkable.  During abdominal and lower extremity exam patient did moan and grimace while palpated but was otherwise nonverbal.  Contact was made with her son, López, who is also the healthcare power of  and care taker.  He reports that she has been having hallucinations for the last week since discharge from the hospital.  He reports she saw plates on the stove, dog urine on the floor, and the baby on the couch.  He was instructed to give risperidone to her during these episodes to help calm hallucinations which she had done and he ensure that his mother always had her glasses on.  He stated the patient asked to go to the hospital based upon how " she was feeling and knew that her legs had hurt.  He reported worsening weakness and described her as slow-moving and not herself.  He described 1 episode of where she was confused in the bathroom and stayed 5 hours alone by herself.  He has concerns about her cognition and reported some issues with word finding.  He request speaking with someone from social work to help with placement of his mother.  He states since the discharge he has been taking care of her medications and has been medically compliant. He confirmed her status as DNR/DNI.    At admission patient met SIRS criteria with hypothermia, and tachypnea.  As noted above patient was put on Allan hugger overnight and vitals have improved.  Patient is severely altered will change medications from p.o. to IV as appropriate.  Endocrinology was consulted and agrees with management of suspected adrenal insufficiency and recommends 20 mg of prednisone.  Will order when patient is safe to swallow tablets. SLP consulted.    Assessment and plan discussed with senior residents and attending physician.  Bing Muñoz D.O.  PGY-1, Internal Medicine   - [facility]

## 2024-03-20 NOTE — PROGRESS NOTES
Occupational Therapy                 Therapy Communication Note    Patient Name: Bing Holliday  MRN: [id number]  Today's Date: 3/20/2024     Discipline: Occupational Therapy    Missed Visit Reason:  Therapists spoke with patient's RN, who stated that pt. Is still on Allan Hugger and is not talking or following commands.  OT eval held.  Reattempt as appropriate.     Missed Time: [id number]     Comment:

## 2024-03-20 NOTE — ED PROVIDER NOTES
EMERGENCY DEPARTMENT ENCOUNTER      Pt Name: Bing Holliday  MRN: [id number]  Birthdate 1960  Date of evaluation: 3/19/2024  Provider: Noemy Archer DO    CHIEF COMPLAINT       Chief Complaint   Patient presents with    Weakness, Gen         HISTORY OF PRESENT ILLNESS    Patient is a 63-year-old female with history of adrenal insufficiency who presents with complaint of weakness.  Patient was brought in by EMS.  They state that patient's son who is her power of  states that the patient has adrenal insufficiency and normally has weakness every so often and then gets admitted to the hospital for this and stays for a few days before improving and going home.  He states that this began on Friday, 3/15/2024.  Patient denies any fevers, chills, night sweats, chest pain, shortness of breath, abdominal pain, nausea/vomiting.      History provided by:  Patient      Nursing Notes were reviewed.    PAST MEDICAL HISTORY     Past Medical History:   Diagnosis Date    Abnormal kidney function 04/04/2023    Acute headache 03/10/2024    Acute low back pain 10/30/2023    Acute upper respiratory infection, unspecified 03/04/2020    Acute URI    Acute upper respiratory infection, unspecified 09/30/2015    URTI (acute upper respiratory infection)    Arthritis     Atypical facial pain 03/10/2024    Body mass index (BMI) 23.0-23.9, adult 10/15/2021    BMI 23.0-23.9, adult    Body mass index (BMI) 33.0-33.9, adult 03/04/2020    BMI 33.0-33.9,adult    Cardiomegaly 08/27/2013    Left ventricular hypertrophy    Chest pain 06/10/2022    Comment on above: Added by Problem List Migration; 2013-3-12; Moved to Suppressed Nov 25 2013 9:16PM; Comment on above: Added by Problem List Migration; 2013-3-12; Moved to Suppressed Nov 25 2013 9:16PM;    Chronic kidney disease, stage 3 unspecified (CMS/[facility]) 07/02/2013    Chronic kidney disease, stage III (moderate)    Confusional state 03/10/2024    Contact with and (suspected) exposure to  covid-19 04/04/2023    Delirium 03/10/2024    Disease of pericardium, unspecified 07/02/2013    Pericardial disease    Encounter for follow-up examination after completed treatment for conditions other than malignant neoplasm 10/06/2022    Hospital discharge follow-up    Generalized contraction of visual field, right eye 01/29/2015    Generalized contraction of visual field of right eye    Hearing loss 03/10/2024    History of cataract 03/10/2024    History of thrombocytopenia 03/10/2024    Comment on above: Added by Problem List Migration; 2013-7-2;    Homonymous bilateral field defects, right side 04/29/2016    Homonymous bilateral field defects of right side    Hypertensive chronic kidney disease with stage 1 through stage 4 chronic kidney disease, or unspecified chronic kidney disease 07/02/2013    Nephrosclerosis    Laceration without foreign body, left foot, initial encounter 07/03/2018    Foot laceration, left, initial encounter    Localized edema 03/10/2024    Mass of skin 03/10/2024    Migraine with aura, not intractable, without status migrainosus 10/24/2022    Ocular migraine    Open wound 03/10/2024    Open wound of left foot 03/10/2024    Other conditions influencing health status 07/02/2013    Chronic Glomerulonephritis In Diseases Classified Elsewhere    Other conditions influencing health status 07/02/2013    Progressive Familial Myoclonic Epilepsy    Other conditions influencing health status 07/02/2013    Protein S Deficiency    Other conditions influencing health status 05/22/2015    Familial Combined Hyperlipidemia    Other conditions influencing health status 10/24/2022    IDDM (insulin dependent diabetes mellitus)    Other conditions influencing health status 03/14/2022    Diabetes mellitus, insulin dependent (IDDM), uncontrolled    Other long term (current) drug therapy 10/24/2022    Long-term use of Plaquenil    Overweight with body mass index (BMI) 25.0-29.9 03/10/2024    Pain of toe  03/10/2024    Personal history of COVID-19 04/04/2023    Personal history of diseases of the blood and blood-forming organs and certain disorders involving the immune mechanism 07/02/2013    History of thrombocytopenia    Personal history of diseases of the skin and subcutaneous tissue 08/11/2015    History of foot ulcer    Personal history of nephrotic syndrome 07/02/2013    History of nephrotic syndrome    Personal history of other diseases of the circulatory system 08/27/2013    History of sinus tachycardia    Personal history of other diseases of the nervous system and sense organs     History of cataract    Personal history of other diseases of the respiratory system     History of bronchitis    Personal history of other infectious and parasitic diseases 07/02/2013    History of hepatitis    Personal history of other specified conditions     History of shortness of breath    Personal history of other specified conditions 08/27/2013    History of edema    Posterior epistaxis 03/10/2024    Puckering of macula, right eye 10/24/2022    ERM OD (epiretinal membrane, right eye)    Raynaud's syndrome without gangrene 07/02/2013    Raynaud's disease    Sinusitis 03/10/2024    Systemic lupus erythematosus, unspecified (CMS/[facility]) 07/24/2015    SLE (systemic lupus erythematosus)    Systemic lupus erythematosus, unspecified (CMS/[facility]) 07/24/2015    SLE (systemic lupus erythematosus)    Systemic lupus erythematosus, unspecified (CMS/[facility]) 07/24/2015    Systemic lupus    Type 2 diabetes mellitus with diabetic nephropathy (CMS/[facility]) 07/02/2013    Type 2 diabetes with nephropathy    Type 2 diabetes mellitus with mild nonproliferative diabetic retinopathy without macular edema, left eye (CMS/[facility]) 07/27/2015    Non-proliferative diabetic retinopathy, left eye    Type 2 diabetes mellitus with mild nonproliferative diabetic retinopathy without macular edema, unspecified eye (CMS/[facility]) 07/24/2015    Mild non proliferative diabetic  retinopathy    Type 2 diabetes mellitus with proliferative diabetic retinopathy without macular edema, right eye (CMS/[facility]) 07/27/2015    Proliferative diabetic retinopathy of right eye    Type 2 diabetes mellitus with proliferative diabetic retinopathy without macular edema, unspecified eye (CMS/[facility]) 07/24/2015    Diabetic proliferative retinopathy    Unspecified acute and subacute iridocyclitis 07/24/2015    Acute iritis, right eye    Unspecified open wound, left foot, sequela 07/03/2018    Wound, open, foot, left, sequela         SURGICAL HISTORY       Past Surgical History:   Procedure Laterality Date    ANKLE SURGERY  01/29/2015    Ankle Surgery    CHOLECYSTECTOMY  01/29/2015    Cholecystectomy    CT GUIDED PERCUTANEOUS BIOPSY BONE DEEP  5/4/2021    CT GUIDED PERCUTANEOUS BIOPSY BONE DEEP 5/4/2021 [facility] CLINICAL LEGACY    EYE SURGERY  03/06/2015    Eye Surgery    FOOT SURGERY  01/29/2015    Foot Surgery    MR HEAD ANGIO WO IV CONTRAST  7/26/2013    MR HEAD ANGIO WO IV CONTRAST 7/26/2013 [facility] CLINICAL LEGACY    MR HEAD ANGIO WO IV CONTRAST  9/17/2021    MR HEAD ANGIO WO IV CONTRAST 9/17/2021 [facility] EMERGENCY LEGACY    MR HEAD ANGIO WO IV CONTRAST  3/25/2023    MR HEAD ANGIO WO IV CONTRAST [facility]    MR NECK ANGIO WO IV CONTRAST  7/26/2013    MR NECK ANGIO WO IV CONTRAST 7/26/2013 [facility] CLINICAL LEGACY    MR NECK ANGIO WO IV CONTRAST  9/17/2021    MR NECK ANGIO WO IV CONTRAST 9/17/2021 [facility]GENCY LEGAC[facility]    MR NECK ANGIO WO IV CONTRAST  3/25/2023    MR NECK ANGIO WO IV CONTRAST [facility]    OTHER SURGICAL HISTORY  01/29/2015    Creation Of Pericardial Window    OTHER SURGICAL HISTORY  01/29/2015    Quadricepsplasty    TOTAL HIP ARTHROPLASTY  01/29/2015    Hip Replacement         CURRENT MEDICATIONS       Previous Medications    ALBUTEROL 90 MCG/ACTUATION INHALER    Inhale 2 puffs every 6 hours if needed for wheezing.    AMLODIPINE (NORVASC) 2.5 MG TABLET    Take 1 tablet (2.5 mg) by mouth once daily.    APIXABAN  "(ELIQUIS) 2.5 MG TABLET    Take 1 tablet (2.5 mg) by mouth 2 times a day.    ATORVASTATIN (LIPITOR) 40 MG TABLET    Take 1 tablet (40 mg) by mouth once daily.    BLOOD-GLUCOSE SENSOR (DEXCOM G6 SENSOR) DEVICE    Use to check sugars 3 times daily    DEXCOM G4 PLATINUM  (DEXCOM G6 ) MISC    Use as instructed    DEXCOM G4 PLATINUM TRANSMITTER (DEXCOM G6 TRANSMITTER) DEVICE    Use as instructed    FLUTICASONE-UMECLIDIN-VILANTER (TRELEGY-ELLIPTA) 200-62.5-25 MCG BLISTER WITH DEVICE    Inhale 1 puff once daily.    FOLIC ACID (FOLVITE) 1 MG TABLET    TAKE 1 TABLET BY MOUTH EVERY DAY    INSULIN GLARGINE (LANTUS SOLOSTAR U-100 INSULIN) 100 UNIT/ML (3 ML) PEN    Inject 20 Units under the skin once daily at bedtime. Take as directed per insulin instructions.    INSULIN LISPRO (ADMELOG SOLOSTAR U-100 INSULIN) 100 UNIT/ML INJECTION    Take as directed per insulin instructions. Sliding scale: 0-150 = 0; 151-200 = 2; 201-250 = 4; 251-300=6; 301-350=8; 351-400= 10; greater than 400, call MD, subcutaneously before meals for DM.    LEVETIRACETAM (KEPPRA) 500 MG TABLET    Take 1 tablet (500 mg) by mouth every 12 hours.    LOPERAMIDE (IMODIUM) 2 MG CAPSULE    Take 1 capsule (2 mg) by mouth 4 times a day as needed for diarrhea.    MELATONIN 5 MG TABLET    Take 1 tablet (5 mg) by mouth once daily at bedtime.    MULTIVITAMIN TABLET    Take 1 tablet by mouth once daily.    MYCOPHENOLATE (CELLCEPT) 500 MG TABLET    TAKE 2 TABLETS BY MOUTH TWICE A DAY    PANTOPRAZOLE (PROTONIX) 40 MG EC TABLET    TAKE 1 TABLET BY MOUTH EVERY DAY    PEN NEEDLE, DIABETIC 31 GAUGE X 5/16\" NEEDLE    Use to inject 1-4 times daily as directed.    PREDNISONE (DELTASONE) 20 MG TABLET    Take 1 tablet (20 mg) by mouth once daily.    RISPERIDONE (RISPERDAL M-TAB) 0.5 MG DISINTEGRATING TABLET    Take 1 tablet (0.5 mg) by mouth once daily at bedtime.    SODIUM ZIRCONIUM CYCLOSILICATE (LOKELMA ORAL)    Take 1 packet by mouth once daily.    THIAMINE 100 MG " TABLET    Take 1 tablet (100 mg) by mouth once daily.       ALLERGIES     Ace inhibitors, Hydroxychloroquine, Lisinopril, Penicillins, and Sulfa (sulfonamide antibiotics)    FAMILY HISTORY       Family History   Problem Relation Name Age of Onset    Other (RENAL DISEASE) Mother          END STAGE    Other (CARDIAC DISORDER) Mother      Cataracts Mother      Stroke Mother      Diabetes Mother      Kidney disease Mother      Lupus Mother      Other (CARDIAC DISORDER) Father      COPD Father      Glaucoma Father      Hypertension Father      Sleep apnea Father      Other (CARDIAC DISORDER) Sister      Depression Sister      Kidney disease Sister      Sickle cell trait Sister      Sleep apnea Daughter            SOCIAL HISTORY       Social History     Socioeconomic History    Marital status:      Spouse name: Not on file    Number of children: Not on file    Years of education: Not on file    Highest education level: Not on file   Occupational History    Not on file   Tobacco Use    Smoking status: Never     Passive exposure: Never    Smokeless tobacco: Never   Vaping Use    Vaping Use: Never used   Substance and Sexual Activity    Alcohol use: Not Currently     Comment: RARE    Drug use: Never    Sexual activity: Defer   Other Topics Concern    Not on file   Social History Narrative    Not on file     Social Determinants of Health     Financial Resource Strain: Low Risk  (3/9/2024)    Overall Financial Resource Strain (CARDIA)     Difficulty of Paying Living Expenses: Not hard at all   Food Insecurity: Patient Unable To Answer (3/9/2024)    Hunger Vital Sign     Worried About Running Out of Food in the Last Year: Patient unable to answer     Ran Out of Food in the Last Year: Patient unable to answer   Transportation Needs: No Transportation Needs (3/9/2024)    PRAPARE - Transportation     Lack of Transportation (Medical): No     Lack of Transportation (Non-Medical): No   Physical Activity: Patient Declined  (1/15/2024)    Exercise Vital Sign     Days of Exercise per Week: Patient declined     Minutes of Exercise per Session: Patient declined   Stress: No Stress Concern Present (1/15/2024)    [employer] - Occupational Stress Questionnaire     Feeling of Stress : Not at all   Social Connections: Unknown (1/15/2024)    Social Connection and Isolation Panel [NHANES]     Frequency of Communication with Friends and Family: More than three times a week     Frequency of Social Gatherings with Friends and Family: More than three times a week     Attends [employer] Services: Patient declined     Active Member of Clubs or Organizations: Patient declined     Attends Club or Organization Meetings: Patient declined     Marital Status: Patient unable to answer   Intimate Partner Violence: Not At Risk (1/15/2024)    Humiliation, Afraid, Rape, and Kick questionnaire     Fear of Current or Ex-Partner: No     Emotionally Abused: No     Physically Abused: No     Sexually Abused: No   Housing Stability: Low Risk  (3/9/2024)    Housing Stability Vital Sign     Unable to Pay for Housing in the Last Year: No     Number of Places Lived in the Last Year: 1     Unstable Housing in the Last Year: No       SCREENINGS                        PHYSICAL EXAM    (up to 7 for level 4, 8 or more for level 5)     ED Triage Vitals [03/19/24 2221]   Temperature Heart Rate Respirations BP   36.8 °C (98.2 °F) 64 20 122/75      Pulse Ox Temp Source Heart Rate Source Patient Position   99 % Temporal -- --      BP Location FiO2 (%)     -- --       Physical Exam  Vitals and nursing note reviewed.   Constitutional:       General: She is not in acute distress.     Appearance: Normal appearance. She is not ill-appearing or toxic-appearing.   HENT:      Head: Normocephalic and atraumatic.      Nose: Nose normal.      Mouth/Throat:      Mouth: Mucous membranes are moist.      Pharynx: Oropharynx is clear.   Eyes:      Extraocular Movements:  Extraocular movements intact.      Conjunctiva/sclera: Conjunctivae normal.   Cardiovascular:      Rate and Rhythm: Normal rate and regular rhythm.      Pulses: Normal pulses.      Heart sounds: Normal heart sounds.   Pulmonary:      Effort: Pulmonary effort is normal. No respiratory distress.      Breath sounds: Normal breath sounds.   Abdominal:      General: Bowel sounds are normal. There is no distension.      Palpations: Abdomen is soft.      Tenderness: There is no abdominal tenderness.   Musculoskeletal:         General: Normal range of motion.   Skin:     General: Skin is warm and dry.      Capillary Refill: Capillary refill takes less than 2 seconds.   Neurological:      General: No focal deficit present.      Mental Status: She is alert. Mental status is at baseline.   Psychiatric:         Mood and Affect: Mood normal.         Behavior: Behavior normal.         Thought Content: Thought content normal.         Judgment: Judgment normal.          DIAGNOSTIC RESULTS     LABS:  Labs Reviewed   CBC WITH AUTO DIFFERENTIAL - Abnormal       Result Value    WBC 6.3      nRBC 0.8 (*)     RBC 2.93 (*)     Hemoglobin 8.7 (*)     Hematocrit 29.1 (*)     MCV 99      MCH 29.7      MCHC 29.9 (*)     RDW 19.8 (*)     Platelets 124 (*)     Neutrophils % 88.9      Immature Granulocytes %, Automated 1.4 (*)     Lymphocytes % 6.5      Monocytes % 3.0      Eosinophils % 0.2      Basophils % 0.0      Neutrophils Absolute 5.61      Immature Granulocytes Absolute, Automated 0.09      Lymphocytes Absolute 0.41 (*)     Monocytes Absolute 0.19      Eosinophils Absolute 0.01      Basophils Absolute 0.00     COMPREHENSIVE METABOLIC PANEL - Abnormal    Glucose 185 (*)     Sodium 143      Potassium 5.2      Chloride 109 (*)     Bicarbonate 25      Anion Gap 14      Urea Nitrogen 51 (*)     Creatinine 1.96 (*)     eGFR 28 (*)     Calcium 9.2      Albumin 3.4      Alkaline Phosphatase 121      Total Protein 6.4      AST 32      Bilirubin,  Total 0.4      ALT 24     SERIAL TROPONIN-INITIAL - Abnormal    Troponin I, High Sensitivity 22 (*)     Narrative:     Less than 99th percentile of normal range cutoff-  Female and children under 18 years old <14 ng/L; Male <21 ng/L: Negative  Repeat testing should be performed if clinically indicated.     Female and children under 18 years old 14-50 ng/L; Male 21-50 ng/L:  Consistent with possible cardiac damage and possible increased clinical   risk. Serial measurements may help to assess extent of myocardial damage.     >50 ng/L: Consistent with cardiac damage, increased clinical risk and  myocardial infarction. Serial measurements may help assess extent of   myocardial damage.      NOTE: Children less than 1 year old may have higher baseline troponin   levels and results should be interpreted in conjunction with the overall   clinical context.     NOTE: Troponin I testing is performed using a different   testing methodology at [facility] than at other   [facility]. Direct result comparisons should only   be made within the same method.   B-TYPE NATRIURETIC PEPTIDE - Abnormal     (*)     Narrative:        <100 pg/mL - Heart failure unlikely  100-299 pg/mL - Intermediate probability of acute heart                  failure exacerbation. Correlate with clinical                  context and patient history.    >=300 pg/mL - Heart Failure likely. Correlate with clinical                  context and patient history.    BNP testing is performed using different testing methodology at [facility] than at other [facility]. Direct result comparisons should only be made within the same method.      SERIAL TROPONIN, 1 HOUR - Abnormal    Troponin I, High Sensitivity 23 (*)     Narrative:     Less than 99th percentile of normal range cutoff-  Female and children under 18 years old <14 ng/L; Male <21 ng/L: Negative  Repeat testing should be performed if clinically indicated.      Female and children under 18 years old 14-50 ng/L; Male 21-50 ng/L:  Consistent with possible cardiac damage and possible increased clinical   risk. Serial measurements may help to assess extent of myocardial damage.     >50 ng/L: Consistent with cardiac damage, increased clinical risk and  myocardial infarction. Serial measurements may help assess extent of   myocardial damage.      NOTE: Children less than 1 year old may have higher baseline troponin   levels and results should be interpreted in conjunction with the overall   clinical context.     NOTE: Troponin I testing is performed using a different   testing methodology at [facility] than at [facility]. Direct result comparisons should only   be made within the same method.   MAGNESIUM - Normal    Magnesium 1.73     SARS-COV-2 AND INFLUENZA A/B PCR - Normal    Flu A Result Not Detected      Flu B Result Not Detected      Coronavirus 2019, PCR Not Detected      Narrative:     This assay has received FDA Emergency Use Authorization (EUA) and  is only authorized for the duration of time that circumstances exist to justify the authorization of the emergency use of in vitro diagnostic tests for the detection of SARS-CoV-2 virus and/or diagnosis of COVID-19 infection under section 564(b)(1) of the Act, 21 U.S.C. 360bbb-3(b)(1). Testing for SARS-CoV-2 is only recommended for patients who meet current clinical and/or epidemiological criteria as defined by federal, state, or local public health directives. This assay is an in vitro diagnostic nucleic acid amplification test for the qualitative detection of SARS-CoV-2, Influenza A, and Influenza B from nasopharyngeal specimens and has been validated for use at [facility]. Negative results do not preclude COVID-19 infections or Influenza A/B infections, and should not be used as the sole basis for diagnosis, treatment, or other management decisions. If Influenza A/B  and RSV PCR results are negative, testing for Parainfluenza virus, Adenovirus and Metapneumovirus is routinely performed for [facility] pediatric oncology and intensive care inpatients, and is available on other patients by placing an add-on request.    BLOOD CULTURE   BLOOD CULTURE   TROPONIN SERIES- (INITIAL, 1 HR)    Narrative:     The following orders were created for panel order Troponin I Series, High Sensitivity (0, 1 HR).  Procedure                               Abnormality         Status                     ---------                               -----------         ------                     Troponin I, High Sensiti...[[id number]]  Abnormal            Final result               Troponin, High Sensitivi...[[id number]]  Abnormal            Final result                 Please view results for these tests on the individual orders.   URINALYSIS WITH REFLEX MICROSCOPIC   ANTI-DNA ANTIBODY, DOUBLE-STRANDED   AMMONIA   TSH WITH REFLEX TO FREE T4 IF ABNORMAL   C-REACTIVE PROTEIN   SEDIMENTATION RATE, AUTOMATED   CBC   BASIC METABOLIC PANEL       All other labs were within normal range or not returned as of this dictation.    Imaging  XR chest 1 view   Final Result   1.  Somewhat low lung volumes with mild pulmonary vascular   congestion. No consolidation or sizable pleural effusion is evident.                  MACRO:   None        Signed by: Jeri Smith 3/19/2024 10:53 PM   Dictation workstation:   [id number]      CT head wo IV contrast    (Results Pending)        Procedures  Procedures     EMERGENCY DEPARTMENT COURSE/MDM:     ED Course as of 03/20/24 0313   Tue Mar 19, 2024   2300 EKG independently interpreted by attending physician    2244 hrs.: Sinus bradycardia ventricular to 58 bpm.  QTc 437.  QRS 92.  Nonspecific T wave abnormality.  No acute injury pattern seen.  LVH. [AI]   Wed Mar 20, 2024   0112 EKG independently interpreted by attending physician    0059 hrs.: Sinus bradycardia ventricular to  53 bpm.   QTc 422.  .  Nonspecific T wave abnormality.  No acute injury pattern seen. [AI]      ED Course User Index  [AI] Abdi Weller DO         Diagnoses as of 03/20/24 0313   Generalized weakness        Medical Decision Making  63-year-old female presents with complaint of weakness.    Nontoxic-appearing female, no acute distress.  Vitals are stable.  Will obtain a cardiac workup.    Viral swabs were negative.  Renal function appears consistent with her baseline range.  Electrolytes are normal.  Troponin mildly elevated at indeterminate level, without concerns for ACS.  Anemia is consistent with her baseline without acute decrease.  Does not appear to be in an adrenal crisis at this time however given her progressively worsening weakness, will admit to medicine for placement.        Patient and or family in agreement and understanding of treatment plan.  All questions answered.      I reviewed the case with the attending ED physician. The attending ED physician agrees with the plan. Patient and/or patient´s representative was counseled regarding labs, imaging, likely diagnosis, and plan. All questions were answered.    ED Medications administered this visit:    Medications   ipratropium-albuteroL (Duo-Neb) 0.5-2.5 mg/3 mL nebulizer solution 3 mL (has no administration in time range)   apixaban (Eliquis) tablet 2.5 mg (has no administration in time range)       New Prescriptions from this visit:    New Prescriptions    No medications on file       Follow-up:  No follow-up provider specified.      Final Impression:   1. Generalized weakness          (Please note that portions of this note were completed with a voice recognition program.  Efforts were made to edit the dictations but occasionally words are mis-transcribed.)     Noemy Archer DO  Resident  03/20/24 0313    The patient was seen by the resident/fellow.  I have personally performed a substantive portion of the encounter.  I have seen and examined the  patient; agree with the workup, evaluation, MDM, management and diagnosis.  The care plan has been discussed with the resident; I have reviewed the resident’s note and agree with the documented findings.           Abdi Weller DO  03/20/24 0535

## 2024-03-20 NOTE — SIGNIFICANT EVENT
CODE STATUS CHANGE - DNR/DNI    Patient's son, López Lang, is healthcare power of .  This was confirmed with forms in her chart as well.  The patient's CODE STATUS will be changed to DNR/DNI per her prior wishes during previous admissions and her son confirmed the status change.    Bing Muñoz D.O.  PGY-1, Internal Medicine   - [facility] - [place]

## 2024-03-20 NOTE — ED NOTES
This nurse attempted to complete swallow eval, pt refused to participate. Unable to complete swallow eval.      Brooklyn Khanna, RN  03/20/24 1657

## 2024-03-20 NOTE — CARE PLAN
The patient's goals for the shift include    Problem: Pain  Goal: My pain/discomfort is manageable  Outcome: Progressing     Problem: Safety  Goal: Patient will be injury free during hospitalization  Outcome: Progressing  Goal: I will remain free of falls  Outcome: Progressing     Problem: Daily Care  Goal: Daily care needs are met  Outcome: Progressing     Problem: Psychosocial Needs  Goal: Demonstrates ability to cope with hospitalization/illness  Outcome: Progressing  Goal: Collaborate with me, my family, and caregiver to identify my specific goals  Outcome: Progressing  Flowsheets (Taken 3/20/2024 1248)  Cultural Requests During Hospitalization: guerita  Spiritual Requests During Hospitalization: guerita     Problem: Discharge Barriers  Goal: My discharge needs are met  Outcome: Progressing     Problem: Fall/Injury  Goal: Not fall by end of shift  Outcome: Progressing  Goal: Be free from injury by end of the shift  Outcome: Progressing  Goal: Verbalize understanding of personal risk factors for fall in the hospital  Outcome: Progressing  Goal: Verbalize understanding of risk factor reduction measures to prevent injury from fall in the home  Outcome: Progressing  Goal: Use assistive devices by end of the shift  Outcome: Progressing  Goal: Pace activities to prevent fatigue by end of the shift  Outcome: Progressing     Problem: Skin  Goal: Decreased wound size/increased tissue granulation at next dressing change  Outcome: Progressing  Flowsheets (Taken 3/20/2024 1257)  Decreased wound size/increased tissue granulation at next dressing change: Promote sleep for wound healing  Goal: Participates in plan/prevention/treatment measures  Outcome: Progressing  Flowsheets (Taken 3/20/2024 1257)  Participates in plan/prevention/treatment measures: Elevate heels  Goal: Prevent/manage excess moisture  Outcome: Progressing  Flowsheets (Taken 3/20/2024 1257)  Prevent/manage excess moisture:   Moisturize dry skin   Cleanse  incontinence/protect with barrier cream  Goal: Prevent/minimize sheer/friction injuries  Outcome: Progressing  Flowsheets (Taken 3/20/2024 1257)  Prevent/minimize sheer/friction injuries:   HOB 30 degrees or less   Turn/reposition every 2 hours/use positioning/transfer devices  Goal: Promote/optimize nutrition  Outcome: Progressing  Flowsheets (Taken 3/20/2024 1257)  Promote/optimize nutrition:   Consume > 50% meals/supplements   Monitor/record intake including meals  Goal: Promote skin healing  Outcome: Progressing  Flowsheets (Taken 3/20/2024 1257)  Promote skin healing:   Assess skin/pad under line(s)/device(s)   Turn/reposition every 2 hours/use positioning/transfer devices       The clinical goals for the shift include to remain HDS this shift

## 2024-03-20 NOTE — PROGRESS NOTES
"Speech-Language Pathology                 Therapy Communication Note    Patient Name: Bing Holliday  MRN: [id number]  Today's Date: 3/20/2024     Discipline: Speech Language Pathology    Missed Visit Reason: Poor participation    Missed Time: Attempt    Comment:  SLP attempted the swallow assessment. RN advised SLP to hold for now as patient is \"not well\" (not following commands/answering questions). SLP team will follow-up. Thank you.           "

## 2024-03-21 LAB
ALBUMIN SERPL BCP-MCNC: 2.8 G/DL (ref 3.4–5)
ALBUMIN SERPL BCP-MCNC: 2.9 G/DL (ref 3.4–5)
ANION GAP SERPL CALC-SCNC: 11 MMOL/L (ref 10–20)
ANION GAP SERPL CALC-SCNC: 13 MMOL/L (ref 10–20)
BASOPHILS # BLD MANUAL: 0 X10*3/UL (ref 0–0.1)
BASOPHILS NFR BLD MANUAL: 0 %
BUN SERPL-MCNC: 41 MG/DL (ref 6–23)
BUN SERPL-MCNC: 43 MG/DL (ref 6–23)
CALCIUM SERPL-MCNC: 8.5 MG/DL (ref 8.6–10.3)
CALCIUM SERPL-MCNC: 8.7 MG/DL (ref 8.6–10.3)
CHLORIDE SERPL-SCNC: 112 MMOL/L (ref 98–107)
CHLORIDE SERPL-SCNC: 114 MMOL/L (ref 98–107)
CO2 SERPL-SCNC: 25 MMOL/L (ref 21–32)
CO2 SERPL-SCNC: 27 MMOL/L (ref 21–32)
CREAT SERPL-MCNC: 2.06 MG/DL (ref 0.5–1.05)
CREAT SERPL-MCNC: 2.12 MG/DL (ref 0.5–1.05)
EGFRCR SERPLBLD CKD-EPI 2021: 26 ML/MIN/1.73M*2
EGFRCR SERPLBLD CKD-EPI 2021: 27 ML/MIN/1.73M*2
EOSINOPHIL # BLD MANUAL: 0 X10*3/UL (ref 0–0.7)
EOSINOPHIL NFR BLD MANUAL: 0 %
ERYTHROCYTE [DISTWIDTH] IN BLOOD BY AUTOMATED COUNT: 20 % (ref 11.5–14.5)
ERYTHROCYTE [DISTWIDTH] IN BLOOD BY AUTOMATED COUNT: 20 % (ref 11.5–14.5)
GIANT PLATELETS BLD QL SMEAR: ABNORMAL
GLUCOSE BLD MANUAL STRIP-MCNC: 110 MG/DL (ref 74–99)
GLUCOSE BLD MANUAL STRIP-MCNC: 116 MG/DL (ref 74–99)
GLUCOSE BLD MANUAL STRIP-MCNC: 122 MG/DL (ref 74–99)
GLUCOSE BLD MANUAL STRIP-MCNC: 137 MG/DL (ref 74–99)
GLUCOSE BLD MANUAL STRIP-MCNC: 170 MG/DL (ref 74–99)
GLUCOSE BLD MANUAL STRIP-MCNC: 176 MG/DL (ref 74–99)
GLUCOSE BLD MANUAL STRIP-MCNC: 182 MG/DL (ref 74–99)
GLUCOSE SERPL-MCNC: 115 MG/DL (ref 74–99)
GLUCOSE SERPL-MCNC: 201 MG/DL (ref 74–99)
HCT VFR BLD AUTO: 26.3 % (ref 36–46)
HCT VFR BLD AUTO: 26.3 % (ref 36–46)
HGB BLD-MCNC: 7.9 G/DL (ref 12–16)
HGB BLD-MCNC: 7.9 G/DL (ref 12–16)
IMM GRANULOCYTES # BLD AUTO: 0.25 X10*3/UL (ref 0–0.7)
IMM GRANULOCYTES NFR BLD AUTO: 4 % (ref 0–0.9)
LYMPHOCYTES # BLD MANUAL: 0.98 X10*3/UL (ref 1.2–4.8)
LYMPHOCYTES NFR BLD MANUAL: 15 %
MAGNESIUM SERPL-MCNC: 1.93 MG/DL (ref 1.6–2.4)
MCH RBC QN AUTO: 30.5 PG (ref 26–34)
MCH RBC QN AUTO: 30.5 PG (ref 26–34)
MCHC RBC AUTO-ENTMCNC: 30 G/DL (ref 32–36)
MCHC RBC AUTO-ENTMCNC: 30 G/DL (ref 32–36)
MCV RBC AUTO: 102 FL (ref 80–100)
MCV RBC AUTO: 102 FL (ref 80–100)
METAMYELOCYTES # BLD MANUAL: 0.13 X10*3/UL
METAMYELOCYTES NFR BLD MANUAL: 2 %
MONOCYTES # BLD MANUAL: 0.13 X10*3/UL (ref 0.1–1)
MONOCYTES NFR BLD MANUAL: 2 %
MYELOCYTES # BLD MANUAL: 0.13 X10*3/UL
MYELOCYTES NFR BLD MANUAL: 2 %
NEUTROPHILS # BLD MANUAL: 5.14 X10*3/UL (ref 1.2–7.7)
NEUTS BAND # BLD MANUAL: 0.46 X10*3/UL (ref 0–0.7)
NEUTS BAND NFR BLD MANUAL: 7 %
NEUTS SEG # BLD MANUAL: 4.68 X10*3/UL (ref 1.2–7)
NEUTS SEG NFR BLD MANUAL: 72 %
NEUTS VAC BLD QL SMEAR: PRESENT
NRBC BLD-RTO: 1.7 /100 WBCS (ref 0–0)
NRBC BLD-RTO: 1.7 /100 WBCS (ref 0–0)
OSMOLALITY UR: 468 MOSM/KG (ref 200–1200)
PHOSPHATE SERPL-MCNC: 2.8 MG/DL (ref 2.5–4.9)
PHOSPHATE SERPL-MCNC: 2.9 MG/DL (ref 2.5–4.9)
PLATELET # BLD AUTO: 110 X10*3/UL (ref 150–450)
PLATELET # BLD AUTO: 110 X10*3/UL (ref 150–450)
POTASSIUM SERPL-SCNC: 4 MMOL/L (ref 3.5–5.3)
POTASSIUM SERPL-SCNC: 4.2 MMOL/L (ref 3.5–5.3)
RBC # BLD AUTO: 2.59 X10*6/UL (ref 4–5.2)
RBC # BLD AUTO: 2.59 X10*6/UL (ref 4–5.2)
RBC MORPH BLD: ABNORMAL
SODIUM SERPL-SCNC: 146 MMOL/L (ref 136–145)
SODIUM SERPL-SCNC: 148 MMOL/L (ref 136–145)
TOTAL CELLS COUNTED BLD: 100
TOXIC GRANULES BLD QL SMEAR: PRESENT
WBC # BLD AUTO: 6.5 X10*3/UL (ref 4.4–11.3)
WBC # BLD AUTO: 6.5 X10*3/UL (ref 4.4–11.3)

## 2024-03-21 PROCEDURE — 36415 COLL VENOUS BLD VENIPUNCTURE: CPT

## 2024-03-21 PROCEDURE — 85027 COMPLETE CBC AUTOMATED: CPT

## 2024-03-21 PROCEDURE — 2500000002 HC RX 250 W HCPCS SELF ADMINISTERED DRUGS (ALT 637 FOR MEDICARE OP, ALT 636 FOR OP/ED)

## 2024-03-21 PROCEDURE — 2500000004 HC RX 250 GENERAL PHARMACY W/ HCPCS (ALT 636 FOR OP/ED)

## 2024-03-21 PROCEDURE — C9113 INJ PANTOPRAZOLE SODIUM, VIA: HCPCS

## 2024-03-21 PROCEDURE — 80069 RENAL FUNCTION PANEL: CPT

## 2024-03-21 PROCEDURE — 82947 ASSAY GLUCOSE BLOOD QUANT: CPT

## 2024-03-21 PROCEDURE — 1200000002 HC GENERAL ROOM WITH TELEMETRY DAILY

## 2024-03-21 PROCEDURE — 99233 SBSQ HOSP IP/OBS HIGH 50: CPT

## 2024-03-21 PROCEDURE — 2500000004 HC RX 250 GENERAL PHARMACY W/ HCPCS (ALT 636 FOR OP/ED): Performed by: STUDENT IN AN ORGANIZED HEALTH CARE EDUCATION/TRAINING PROGRAM

## 2024-03-21 PROCEDURE — 83735 ASSAY OF MAGNESIUM: CPT

## 2024-03-21 PROCEDURE — 85007 BL SMEAR W/DIFF WBC COUNT: CPT

## 2024-03-21 RX ORDER — DEXTROSE MONOHYDRATE 50 MG/ML
75 INJECTION, SOLUTION INTRAVENOUS CONTINUOUS
Status: ACTIVE | OUTPATIENT
Start: 2024-03-21 | End: 2024-03-22

## 2024-03-21 RX ADMIN — INSULIN LISPRO 2 UNITS: 100 INJECTION, SOLUTION INTRAVENOUS; SUBCUTANEOUS at 15:27

## 2024-03-21 RX ADMIN — PANTOPRAZOLE SODIUM 40 MG: 40 INJECTION, POWDER, FOR SOLUTION INTRAVENOUS at 09:00

## 2024-03-21 RX ADMIN — HYDROCORTISONE SODIUM SUCCINATE 50 MG: 100 INJECTION, POWDER, FOR SOLUTION INTRAMUSCULAR; INTRAVENOUS at 23:46

## 2024-03-21 RX ADMIN — LEVETIRACETAM 500 MG: 5 INJECTION INTRAVENOUS at 13:50

## 2024-03-21 RX ADMIN — HYDROCORTISONE SODIUM SUCCINATE 50 MG: 100 INJECTION, POWDER, FOR SOLUTION INTRAMUSCULAR; INTRAVENOUS at 05:46

## 2024-03-21 RX ADMIN — INSULIN LISPRO 2 UNITS: 100 INJECTION, SOLUTION INTRAVENOUS; SUBCUTANEOUS at 20:13

## 2024-03-21 RX ADMIN — HYDROCORTISONE SODIUM SUCCINATE 50 MG: 100 INJECTION, POWDER, FOR SOLUTION INTRAMUSCULAR; INTRAVENOUS at 16:07

## 2024-03-21 RX ADMIN — LEVETIRACETAM 500 MG: 5 INJECTION INTRAVENOUS at 02:20

## 2024-03-21 RX ADMIN — INSULIN LISPRO 2 UNITS: 100 INJECTION, SOLUTION INTRAVENOUS; SUBCUTANEOUS at 23:45

## 2024-03-21 RX ADMIN — DEXTROSE MONOHYDRATE 75 ML/HR: 50 INJECTION, SOLUTION INTRAVENOUS at 21:12

## 2024-03-21 RX ADMIN — ENOXAPARIN SODIUM 150 MG: 150 INJECTION SUBCUTANEOUS at 14:00

## 2024-03-21 RX ADMIN — HYDROCORTISONE SODIUM SUCCINATE 50 MG: 100 INJECTION, POWDER, FOR SOLUTION INTRAMUSCULAR; INTRAVENOUS at 10:08

## 2024-03-21 RX ADMIN — DEXTROSE MONOHYDRATE 125 ML/HR: 50 INJECTION, SOLUTION INTRAVENOUS at 08:52

## 2024-03-21 ASSESSMENT — COGNITIVE AND FUNCTIONAL STATUS - GENERAL
PERSONAL GROOMING: TOTAL
DRESSING REGULAR UPPER BODY CLOTHING: A LOT
CLIMB 3 TO 5 STEPS WITH RAILING: TOTAL
DAILY ACTIVITIY SCORE: 12
MOVING TO AND FROM BED TO CHAIR: A LOT
MOVING TO AND FROM BED TO CHAIR: TOTAL
DRESSING REGULAR UPPER BODY CLOTHING: A LOT
EATING MEALS: A LOT
MOVING FROM LYING ON BACK TO SITTING ON SIDE OF FLAT BED WITH BEDRAILS: A LOT
TOILETING: A LOT
HELP NEEDED FOR BATHING: A LOT
TURNING FROM BACK TO SIDE WHILE IN FLAT BAD: A LOT
EATING MEALS: TOTAL
CLIMB 3 TO 5 STEPS WITH RAILING: A LOT
STANDING UP FROM CHAIR USING ARMS: A LOT
DRESSING REGULAR LOWER BODY CLOTHING: A LOT
PERSONAL GROOMING: A LOT
TURNING FROM BACK TO SIDE WHILE IN FLAT BAD: A LOT
HELP NEEDED FOR BATHING: A LOT
MOVING FROM LYING ON BACK TO SITTING ON SIDE OF FLAT BED WITH BEDRAILS: A LOT
MOBILITY SCORE: 12
DRESSING REGULAR LOWER BODY CLOTHING: TOTAL
DAILY ACTIVITIY SCORE: 8
MOBILITY SCORE: 8
WALKING IN HOSPITAL ROOM: A LOT
TOILETING: TOTAL
WALKING IN HOSPITAL ROOM: TOTAL
STANDING UP FROM CHAIR USING ARMS: TOTAL

## 2024-03-21 ASSESSMENT — PAIN SCALES - GENERAL
PAINLEVEL_OUTOF10: 0 - NO PAIN
PAINLEVEL_OUTOF10: 0 - NO PAIN

## 2024-03-21 ASSESSMENT — PAIN - FUNCTIONAL ASSESSMENT: PAIN_FUNCTIONAL_ASSESSMENT: 0-10

## 2024-03-21 NOTE — PROGRESS NOTES
03/21/24 1108   Patient Choice   Provider Choice list and CMS website (https://medicare.gov/care-compare#search) for post-acute Quality and Resource Measure Data were provided and reviewed with: Family  (Son López wants referral to [facility] for SNF referral sent)     Called [facility]. SNF choice given. States he thinks his mom has applied for Medicaid. Not sure where that is. He is happy to do what he can. Both our  and [facility] notified of this.

## 2024-03-21 NOTE — PROGRESS NOTES
Speech-Language Pathology                 Therapy Communication Note    Patient Name: Bing Holliday  MRN: [id number]  Today's Date: 3/21/2024     Discipline: Speech Language Pathology    Missed Visit Reason: Missed Time Reason: Patient unwilling to participate    Missed Time: Attempt    Comment: SLP attempted the swallow assessment. Patient was moaning/averting from SLP, and was orally resistant when SLP attempted the OME/PO trials. Patient was not receptive to encouragement, cues, and education. SLP team will follow-up. SLP to update RN. Thank you.

## 2024-03-21 NOTE — PROGRESS NOTES
"Nutrition Initial Assessment:   Nutrition Assessment    Reason for Assessment: Dietitian discretion    Patient is a 63 y.o. female presenting with generalized weakness. Endocrinology following. Pt remains NPO since admit since she is not participating with SLP eval. She is also not participating with PT. Discharge plans pending SNF and not to return home.    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 Since her admit, she has only been moaning and not really responding to staff. Today, she opened her eyes, moved her head back and forth and then closed them. Nursing reported this has been her baseline since admit - she didn't take any meds today. She doesn't seem interested in food or fluids and has not had any since admit. SLP has attempted daily to assess her swallow function, yet she will not participate. Nursing suggested calling son who is very invloved in her care. Spoke to son on the phone today and he reports pt was eating pretty normally up until the day before she was admitted. She had her usual cream of wheat for bfast, fruit and a sandwich for lunch and a ground turkey meal for dinner. His sister is next to him while he confirms with her and continues to report that the day of admit, she did seem more weak and was not eating as much.  Vitamin/Herbal Supplement Use: Folic Acid, MVI, Thiamine - 100mg daily  Food Allergies/Intolerances:  None  GI Symptoms: None  Oral Problems:  pending swallow eval with SLP       Anthropometrics:  Height: 167.6 cm (5' 5.98\")   Weight: 96.5 kg (212 lb 11.9 oz)   BMI (Calculated): 34.35  IBW/kg (Dietitian Calculated): 58.93 kg  Percent of IBW: " 163.75 %       Weight History:   Wt Readings from Last 10 Encounters:   01/17/24 99.3 kg (218 lb 14.7 oz)   01/17/24 96 kg (211 lb 10.3 oz)   12/26/23 93 kg (205 lb)   12/18/23 93.4 kg (205 lb 14.6 oz)   11/30/23 105 kg (231 lb)   11/20/23 94.8 kg (208 lb 15.9 oz)   10/19/23 94.6 kg (208 lb 8.9 oz)   09/14/23 94.3 kg (207 lb 12.8 oz)   08/01/23 95.3 kg (210 lb)   07/07/23 98.4 kg (217 lb)       Weight Change %:       Nutrition Focused Physical Exam Findings:    Subcutaneous Fat Loss:   Buccal Fat Pads: Well nourished (full, rounded cheeks)  Triceps: Well nourished (ample fat tissue)  Ribs: Defer  Muscle Wasting:  Temporalis: Well nourished (well-defined muscle)  Pectoralis (Clavicular Region): Well nourished (clavicle not visible)  Deltoid/Trapezius: Well nourished (rounded appearance at arm, shoulder, neck)  Interosseous: Defer  Trapezius/Infraspinatus/Supraspinatus (Scapular Region): Defer  Quadriceps: Defer  Gastrocnemius: Defer  Edema:  Edema: +3 moderate  Edema Location: BLE  Physical Findings:  Skin: Positive (Dry; Flaky; stage 2 pressure injury to buttocks)    Nutrition Significant Labs:  CBC Trend:   Results from last 7 days   Lab Units 03/21/24  0644 03/20/24  0504 03/19/24  2328   WBC AUTO x10*3/uL 6.5  6.5 5.5 6.3   RBC AUTO x10*6/uL 2.59*  2.59* 2.68* 2.93*   HEMOGLOBIN g/dL 7.9*  7.9* 8.2* 8.7*   HEMATOCRIT % 26.3*  26.3* 26.9* 29.1*   MCV fL 102*  102* 100 99   PLATELETS AUTO x10*3/uL 110*  110* 110* 124*    , BMP Trend:   Results from last 7 days   Lab Units 03/21/24  0644 03/20/24  0504 03/19/24  2328   GLUCOSE mg/dL 115* 201* 185*   CALCIUM mg/dL 8.7 9.0 9.2   SODIUM mmol/L 148* 143 143   POTASSIUM mmol/L 4.2 4.3 5.2   CO2 mmol/L 25 24 25   CHLORIDE mmol/L 114* 111* 109*   BUN mg/dL 43* 49* 51*   CREATININE mg/dL 2.12* 1.82* 1.96*    , A1C:  Lab Results   Component Value Date    HGBA1C 8.0 (H) 02/08/2024   , BG POCT trend:   Results from last 7 days   Lab Units 03/21/24  1111 03/21/24  0710  03/21/24  0554 03/21/24  0240 03/20/24  2041   POCT GLUCOSE mg/dL 137* 122* 110* 116* 128*    , Folate:   Lab Results   Component Value Date    FOLATE >24.0 01/16/2024    , CF Vitamin Labs:   Lab Results   Component Value Date    VITD25 34 12/10/2022        Nutrition Specific Medications:  Reviewed    I/O:   Last BM Date: 03/19/24;      Dietary Orders (From admission, onward)       Start     Ordered    03/20/24 0313  NPO Diet Except: Sips with meds; Effective now  Diet effective now        Comments: If passes nursing swallow evaluation, okay for sips of meds with water   Question:  Except:  Answer:  Sips with meds    03/20/24 0313                     Estimated Needs:   Total Energy Estimated Needs (kCal): 1849 kCal  Method for Estimating Needs: MSJ=1541 x 1.2 x 1.0  Total Protein Estimated Needs (g):  (78-85g)  Method for Estimating Needs: 1.2-1.3 x 65kg     Method for Estimating Needs: 1mL/kcal/day        Nutrition Diagnosis   Malnutrition Diagnosis  Patient has Malnutrition Diagnosis: No    Nutrition Diagnosis  Patient has Nutrition Diagnosis: Yes  Diagnosis Status (1): New  Nutrition Diagnosis 1: Inadequate oral intake  Related to (1): altered mental status  As Evidenced by (1): refusing to participate in swallow eval and remains NPO on day 3  Additional Nutrition Diagnosis: Diagnosis 2  Diagnosis Status (2): New  Nutrition Diagnosis 2: Increased nutrient needs  Related to (2): increased metabolic demand  As Evidenced by (2): need for healing/recovery       Nutrition Interventions/Recommendations         Nutrition Prescription:  Individualized Nutrition Prescription Provided for : Follow SLP recommendations for diet texture and advance as tolerated. Would suggest liberalizing therapeutic restrictions when diet advanced to encourage intakes. If pt remains NPO for >5 days, consider dobhoff placement and enteral nutrition. (Glucerna 1.5 @ goal of 50mL/hr with 150mL H2O flushes every 4 hours)        Nutrition  Interventions:   Interventions: Meals and snacks, Enteral intake, Vitamin supplement therapy  Meals and Snacks: Texture-modified diet, General healthful diet  Goal: Regular diet - follow SLP recs for texture modifications.  Enteral Intake: Insert enteral feeding tube  Goal: If >5 days NPO, suggest inserting dobhoff tube and starting enteral nutrition  Vitamin Supplement Therapy: Thiamin  Goal: Continue 100mg daily    Goal: Case discussed with AMINAH Ortez    Nutrition Education:   Pt not appropriate at this time.        Nutrition Monitoring and Evaluation   Food/Nutrient Related History Monitoring  Monitoring and Evaluation Plan: Energy intake  Energy Intake: Estimated energy intake  Criteria: Pt to meet >75% of estimated energy intakes within the next 72 hours.    Body Composition/Growth/Weight History  Monitoring and Evaluation Plan: Weight change  Weight Change: Weight change intent  Criteria: Pt to maintain weight.    Biochemical Data, Medical Tests and Procedures  Monitoring and Evaluation Plan: Electrolyte/renal panel, Glucose/endocrine profile  Electrolyte and Renal Panel: BUN, Creatinine, Sodium  Criteria: to improve to normal ranges  Glucose/Endocrine Profile: Glucose, casual  Criteria: Maintain goal of 70-180mg/dL    Nutrition Focused Physical Findings  Monitoring and Evaluation Plan: Digestive System  Digestive System: Decrease in appetite  Criteria: Appetite to improve       Time Spent/Follow-up Reminder:   Time Spent (min): 60 minutes  Last Date of Nutrition Visit: 03/21/24  Nutrition Follow-Up Needed?: 3-5 days  Follow up Comment: SANDRA. NPO day 3

## 2024-03-21 NOTE — NURSING NOTE
Nurse attempted to do mouth care for patient - patient cont to yell and scream - starting to hit staff - will attempt again - call light in reach

## 2024-03-21 NOTE — PROGRESS NOTES
"    Endocrinology Inpatient Consult Progress Note     PATIENT NAME: Bing Hollidya  MRN: [id number]  DATE: 3/21/2024    CONSULTING PHYSICIAN: Dr. Haywood  REASON FOR CONSULT: AI. T2DM. Hypoglycemia.      Interval Events     No acute vents overnight.  I saw the patient this morning.  She was not very conversational.  She states that she just wants to sleep.  She states that she is chilled.  No fevers.  Denies any dysuria.  Was not very interactive with me.      Physical Examination     /77 (BP Location: Left arm, Patient Position: Lying)   Pulse 65   Temp 36.6 °C (97.9 °F)   Resp 18   Ht 1.676 m (5' 5.98\")   Wt 96.5 kg (212 lb 11.9 oz)   LMP  (LMP Unknown)   SpO2 93%   BMI 34.35 kg/m²   No acute distress.  Sleeping.  Fairly withdrawn.  Somnolent at times.  Regular rate and rhythm.  Nonlabored aspiration.  Soft nontender nondistended abdomen.  No pedal edema bilaterally.      Medications     Reviewed MAR       Data     Recent Labs and Imaging Reviewed      Assessment / Plan        # Adrenal Insufficiency  Patient's heart rate has been in the 70s. Her temperature since arrival has been in the upper 90s. Interventions for for her hypothermia and bradycardia have been starting her on stress dose steroids as well as putting her on a Allan hugger.  The patient's son states that she has been compliant with her prednisone.  She is on prednisone 20 mg.  This is a supraphysiologic dose.  I obtained some pituitary function testing.  Her FSH and LH were appropriately elevated for a patient who is postmenopausal.  Both TSH and free thyroid indices were within normal limits.  I still remain unclear why she presented the way she did.  Nonetheless, I really would like to put her on prednisone 20 mg and keep her for a couple of days and see if she does fine on this dose.  Things seem to always fall apart with this patient once she goes home - why that is occurring is unclear to me.    # Hypoglycemia  She was " hyperglycemic on arrival.  She has presented with hypoglycemia couple times this year.  During her last hospitalization this did not recur.  Her sugars in the last 24 hours have been at goal.     # Uncontrolled Type 2 Diabetes Mellitus  Home Regimen: None.  Hemoglobin A1c (7/23): 6.9%  Nutrtion: Regular Diet.     She tends to get hyperglycemic on high doses of steroids.  I think sliding scale insulin with meals and at bedtime is reasonable for now.     # Osteoporosis  In the setting of chronic glucocorticoid use.  This will be further managed as an outpatient.  She probably would benefit from a bisphosphonate, these agents are best studied with steroid-induced adynamic bone disease.         Avi Sloan, DO  Endocrinology, Diabetes, and Metabolism    Available via [employer] Messenger    Please excuse any typographical or unwanted errors within this documentation as voice recognition software was used to dictate this note.

## 2024-03-21 NOTE — PROGRESS NOTES
Physical Therapy                 Therapy Communication Note    Patient Name: Bing Holliday  MRN: [id number]  Today's Date: 3/21/2024     Discipline: Physical Therapy    Missed Time: Attempt    Comment:  PT orders received, chart reviewed.  Pt continues to be somnolent and was unable to follow commands.  Pt not appropriate for therapy evaluation at this time.  Will re-attempt as appropriate.

## 2024-03-21 NOTE — CARE PLAN
Problem: Pain  Goal: My pain/discomfort is manageable  3/20/2024 2214 by Jesus Avila RN  Outcome: Progressing  3/20/2024 2211 by Jesus Avila RN  Outcome: Progressing   The patient's goals for the shift include      The clinical goals for the shift include pt will not fall    Over the shift, the patient did not make progress toward the following goals. Barriers to progression include . Recommendations to address these barriers include .

## 2024-03-21 NOTE — PROGRESS NOTES
Bing Holliday is a 63 y.o. female on day 0 of admission presenting with Generalized weakness.      Subjective   On morning exam able to answer some yes/no questions.    In the afternoon spoke with her son López and Sandra who were bedside at length regarding her care at home.  They expressed concerns about her ongoing care and were agreeable to talk to palliative care.       Objective     Last Recorded Vitals  /74 (BP Location: Right arm, Patient Position: Lying)   Pulse 63   Temp 36 °C (96.8 °F) (Temporal)   Resp 18   Wt 96.5 kg (212 lb 11.9 oz)   SpO2 94%   Intake/Output last 3 Shifts:    Intake/Output Summary (Last 24 hours) at 3/21/2024 1618  Last data filed at 3/21/2024 1523  Gross per 24 hour   Intake 732.92 ml   Output 700 ml   Net 32.92 ml       Admission Weight  Weight: 96.5 kg (212 lb 11.9 oz) (03/19/24 2221)    Daily Weight  03/19/24 : 96.5 kg (212 lb 11.9 oz)    Image Results  MR brain w and wo IV contrast  Narrative: Interpreted By:  Jeri Smith,   STUDY:  MR BRAIN W AND WO IV CONTRAST;  3/20/2024 10:34 pm      INDICATION:  Signs/Symptoms:encephalopathy.      COMPARISON:  MRI of the brain dated 01/05/2024; same day noncontrast CT head      ACCESSION NUMBER(S):  [id number]      ORDERING CLINICIAN:  BING MAHER      TECHNIQUE:  Multiplanar and multisequence MRI of the brain was performed before  and after intravenous administration of 20 mL of Dotarem gadolinium  contrast.      FINDINGS:  Exam is degraded by motion.      No diffusion restriction abnormality is present to suggest an acute  infarct.      There is no imaging evidence to suggest acute intracranial  hemorrhage. No mass effect or midline shift is evident.      Hemosiderin staining is present within the area of cystic  encephalomalacia and gliosis in the left occipital lobe, likely  representing sequela of prior infarct/injury.      No ventricular dilatation is evident. Basal cisterns are patent. No  extra-axial  fluid collections are identified.      Patchy and confluence areas of hyperintense FLAIR and T2 signal are  present in the periventricular and subcortical white matter of  bilateral cerebral hemispheres, nonspecific findings favored to  represent sequela of microvascular disease.      No pathologic intracranial enhancement is identified.      Visualized large arterial flow voids, including intracranial carotid  and basilar artery appear preserved.      Mild mucosal thickening is present in the inferior maxillary sinuses  bilaterally. No abnormal fluid signal is present in the mastoid air  cells bilaterally.      Impression: 1. Within limitations of somewhat motion degraded study, no evidence  of new intracranial infarct, hemorrhage or pathologic enhancement.  2. Geographic area of cystic encephalomalacia and gliosis present in  the left occipital lobe likely represents chronic sequela of prior  infarct/injury, similar in appearance to prior exam.  3. Patchy and confluence areas of hyperintense FLAIR and T2 signal  are present in the periventricular and subcortical white matter of  bilateral cerebral hemispheres, nonspecific findings favored to  represent sequela of microvascular disease.      MACRO:  None      Signed by: Jeri Smith 3/20/2024 11:57 PM  Dictation workstation:   [id number]  CT chest abdomen pelvis wo IV contrast  Narrative: Interpreted By:  Richie Garcia,   STUDY:  CT CHEST ABDOMEN PELVIS WO CONTRAST;  3/20/2024 6:16 pm      INDICATION:  Signs/Symptoms:sepsis      COMPARISON:  CT of the chest, abdomen, and pelvis 01/14/2024      ACCESSION NUMBER(S):  [id number]      ORDERING CLINICIAN:  LISBET MAHER      TECHNIQUE:  CT of the chest, abdomen, and pelvis was performed.  Volumetric CT imaging through the chest, abdomen, and pelvis without  contrast.. Multiplanar reconstructions were processed on a separate  workstation.      FINDINGS:  CHEST:      Motion limited examination. Asymmetric  mixed reticular and airspace  opacities of the right greater left central upper lobes and central  right and left lower lobes suggestive of either pneumonitis or  atypical pulmonary edema. No pneumothorax or effusion.      The thoracic aorta is unchanged with respect course, caliber contour.      The main pulmonary artery and its branches are unchanged with respect  course, caliber, and contour.      Cardiomegaly with severe coronary atherosclerotic calcifications and  probable background of coronary stents. Relative low density of  intravascular volume with respect to the vessel wall suggestive of  anemia. Osseous structures appear stable with chronic appearing  compression irregularity of T7. Similar appearing background of  multilevel spondylosis. Minimal step-off of the mid sternum, presumed  secondary to motion artifact (series 500, image 72).      ABDOMEN:      Motion limited examination.      The nonenhanced liver, spleen, bilateral adrenal glands, pancreas,  and bilateral kidneys are grossly unchanged. No abnormal dilatation  of large or small bowel. Colonic diverticulosis. No obvious acute  diverticulitis is detected. The lower pelvis is suboptimally assessed  due to extensive beam hardening artifact from right hip arthroplasty.  The urinary bladder and lower pelvic organs are not adequately  assessed. There is significant wall thickening of the ascending colon  with minimal adjacent reticular changes. No evidence of abdominal  aortic aneurysm. Osseous structures appear stable. Chronic appearing  height loss of the L1 and L2 vertebral bodies. Severe multilevel  spondylosis.      Impression: CHEST  1.  Motion limited examination.  2. Relatively central mixed reticular and airspace opacities  suggestive either multifocal pneumonitis or atypical pulmonary edema.  3. Remaining intrathoracic findings appear stable since comparison CT  imaging 01/14/2024.      ABDOMEN - PELVIS  1.  Nonspecific wall thickening of  the ascending colon suggestive of  a nonspecific colitis. Correlate for infectious, inflammatory, or  ischemic etiologies.  2. Colonic diverticulosis without evidence of acute diverticulitis.  3. Remaining findings appear stable since comparison CT imaging  01/14/2024.          MACRO:  None      Signed by: Richie Garcia 3/20/2024 8:02 PM  Dictation workstation:   [id number]  ECG 12 lead  Sinus bradycardia  Voltage criteria for left ventricular hypertrophy  Nonspecific T wave abnormality  Abnormal ECG  When compared with ECG of 09-MAR-2024 18:13,  Vent. rate has decreased BY  36 BPM  Nonspecific T wave abnormality no longer evident in Anterior leads  ECG 12 lead  Sinus bradycardia  Voltage criteria for left ventricular hypertrophy  Nonspecific T wave abnormality  Abnormal ECG  When compared with ECG of 19-MAR-2024 22:44, (unconfirmed)  No significant change was found  CT head wo IV contrast  Narrative: Interpreted By:  Anselmo Chan,   STUDY:  CT HEAD WO IV CONTRAST;  3/20/2024 3:31 am      INDICATION:  Signs/Symptoms:AMS.      COMPARISON:  CT scan of the head 03/09/2024      ACCESSION NUMBER(S):  [id number]      ORDERING CLINICIAN:  GOMEZ CHAMBERS      TECHNIQUE:  Axial noncontrast CT images of the head.      FINDINGS:  BRAIN PARENCHYMA: Stable focal area of encephalomalacia in the left  occipital lobe likely sequela of remote infarct. Gray-white matter  interfaces are otherwise preserved. No mass, mass effect or midline  shift. Mild deep and periventricular white matter hypodensities are  nonspecific, but favored to represent chronic small vessel ischemic  changes. Calcifications bilateral basal ganglia are likely  physiologic.      HEMORRHAGE: No acute intracranial hemorrhage.  VENTRICLES and EXTRA-AXIAL SPACES: Mild volume loss with prominence  of the ventricles and sulci. EXTRACRANIAL SOFT TISSUES: Within normal  limits. PARANASAL SINUSES/MASTOIDS: The visualized paranasal sinuses  and mastoid air cells  are aerated. CALVARIUM: No depressed skull  fracture. No destructive osseous lesion.      OTHER FINDINGS: Atherosclerotic calcification of the carotid siphons  and vertebral arteries..      Impression: No acute intracranial abnormality. If symptoms persist or there is  high clinical suspicion further evaluation with MRI can be considered.      Chronic changes as noted above.      MACRO:  None      Signed by: Anselmo Chan 3/20/2024 3:40 AM  Dictation workstation:   [id number]      Physical Exam  Constitutional:       Appearance: She is ill-appearing.   HENT:      Mouth/Throat:      Mouth: Mucous membranes are moist.      Pharynx: Oropharynx is clear.   Eyes:      Conjunctiva/sclera: Conjunctivae normal.   Cardiovascular:      Rate and Rhythm: Normal rate and regular rhythm.      Pulses: Normal pulses.   Pulmonary:      Effort: Pulmonary effort is normal.      Breath sounds: Normal breath sounds.   Abdominal:      Comments: Grimacing and moaning on palpation   Musculoskeletal:      Comments: Bilateral swelling nonpitting no weeping   Skin:     Comments: Sacral wound, wound left lateral foot   Neurological:      Comments: Answers only yes or no questions         Relevant Results  Scheduled medications  [Held by provider] amLODIPine, 2.5 mg, oral, Daily  [Held by provider] apixaban, 2.5 mg, oral, BID  [Held by provider] atorvastatin, 40 mg, oral, Daily  enoxaparin, 1.5 mg/kg, subcutaneous, q24h  [Held by provider] folic acid, 1 mg, oral, Daily  hydrocortisone sodium succinate, 50 mg, intravenous, q6h  insulin lispro, 0-10 Units, subcutaneous, q4h  [Held by provider] levETIRAcetam, 500 mg, oral, q12h  levETIRAcetam, 500 mg, intravenous, q12h  mycophenolate, 1,000 mg, oral, BID  pantoprazole, 40 mg, intravenous, Daily  [Held by provider] predniSONE, 20 mg, oral, Daily  [Held by provider] thiamine, 100 mg, oral, Daily      Continuous medications  dextrose 5 % in water (D5W), 75 mL/hr, Last Rate: 75 mL/hr (03/21/24  1523)      PRN medications  PRN medications: dextrose, dextrose, diclofenac sodium, glucagon, ipratropium-albuteroL, melatonin    Results for orders placed or performed during the hospital encounter of 03/19/24 (from the past 24 hour(s))   POCT GLUCOSE   Result Value Ref Range    POCT Glucose 128 (H) 74 - 99 mg/dL   POCT GLUCOSE   Result Value Ref Range    POCT Glucose 116 (H) 74 - 99 mg/dL   POCT GLUCOSE   Result Value Ref Range    POCT Glucose 110 (H) 74 - 99 mg/dL   CBC   Result Value Ref Range    WBC 6.5 4.4 - 11.3 x10*3/uL    nRBC 1.7 (H) 0.0 - 0.0 /100 WBCs    RBC 2.59 (L) 4.00 - 5.20 x10*6/uL    Hemoglobin 7.9 (L) 12.0 - 16.0 g/dL    Hematocrit 26.3 (L) 36.0 - 46.0 %     (H) 80 - 100 fL    MCH 30.5 26.0 - 34.0 pg    MCHC 30.0 (L) 32.0 - 36.0 g/dL    RDW 20.0 (H) 11.5 - 14.5 %    Platelets 110 (L) 150 - 450 x10*3/uL   Renal function panel   Result Value Ref Range    Glucose 115 (H) 74 - 99 mg/dL    Sodium 148 (H) 136 - 145 mmol/L    Potassium 4.2 3.5 - 5.3 mmol/L    Chloride 114 (H) 98 - 107 mmol/L    Bicarbonate 25 21 - 32 mmol/L    Anion Gap 13 10 - 20 mmol/L    Urea Nitrogen 43 (H) 6 - 23 mg/dL    Creatinine 2.12 (H) 0.50 - 1.05 mg/dL    eGFR 26 (L) >60 mL/min/1.73m*2    Calcium 8.7 8.6 - 10.3 mg/dL    Phosphorus 2.9 2.5 - 4.9 mg/dL    Albumin 2.9 (L) 3.4 - 5.0 g/dL   Magnesium   Result Value Ref Range    Magnesium 1.93 1.60 - 2.40 mg/dL   CBC and Auto Differential   Result Value Ref Range    WBC 6.5 4.4 - 11.3 x10*3/uL    nRBC 1.7 (H) 0.0 - 0.0 /100 WBCs    RBC 2.59 (L) 4.00 - 5.20 x10*6/uL    Hemoglobin 7.9 (L) 12.0 - 16.0 g/dL    Hematocrit 26.3 (L) 36.0 - 46.0 %     (H) 80 - 100 fL    MCH 30.5 26.0 - 34.0 pg    MCHC 30.0 (L) 32.0 - 36.0 g/dL    RDW 20.0 (H) 11.5 - 14.5 %    Platelets 110 (L) 150 - 450 x10*3/uL    Immature Granulocytes %, Automated 4.0 (H) 0.0 - 0.9 %    Immature Granulocytes Absolute, Automated 0.25 0.00 - 0.70 x10*3/uL   Manual Differential   Result Value Ref Range     Neutrophils %, Manual 72.0 40.0 - 80.0 %    Bands %, Manual 7.0 0.0 - 5.0 %    Lymphocytes %, Manual 15.0 13.0 - 44.0 %    Monocytes %, Manual 2.0 2.0 - 10.0 %    Eosinophils %, Manual 0.0 0.0 - 6.0 %    Basophils %, Manual 0.0 0.0 - 2.0 %    Metamyelocytes %, Manual 2.0 0.0 - 0.0 %    Myelocytes %, Manual 2.0 0.0 - 0.0 %    Seg Neutrophils Absolute, Manual 4.68 1.20 - 7.00 x10*3/uL    Bands Absolute, Manual 0.46 0.00 - 0.70 x10*3/uL    Lymphocytes Absolute, Manual 0.98 (L) 1.20 - 4.80 x10*3/uL    Monocytes Absolute, Manual 0.13 0.10 - 1.00 x10*3/uL    Eosinophils Absolute, Manual 0.00 0.00 - 0.70 x10*3/uL    Basophils Absolute, Manual 0.00 0.00 - 0.10 x10*3/uL    Metamyelocytes Absolute, Manual 0.13 0.00 - 0.00 x10*3/uL    Myelocytes Absolute, Manual 0.13 0.00 - 0.00 x10*3/uL    Total Cells Counted 100     Neutrophils Absolute, Manual 5.14 1.20 - 7.70 x10*3/uL    RBC Morphology See Below     Toxic Granulation Present     Vacuolated Neutrophils Present     Giant Platelets Few    POCT GLUCOSE   Result Value Ref Range    POCT Glucose 122 (H) 74 - 99 mg/dL   POCT GLUCOSE   Result Value Ref Range    POCT Glucose 137 (H) 74 - 99 mg/dL   Renal function panel   Result Value Ref Range    Glucose 201 (H) 74 - 99 mg/dL    Sodium 146 (H) 136 - 145 mmol/L    Potassium 4.0 3.5 - 5.3 mmol/L    Chloride 112 (H) 98 - 107 mmol/L    Bicarbonate 27 21 - 32 mmol/L    Anion Gap 11 10 - 20 mmol/L    Urea Nitrogen 41 (H) 6 - 23 mg/dL    Creatinine 2.06 (H) 0.50 - 1.05 mg/dL    eGFR 27 (L) >60 mL/min/1.73m*2    Calcium 8.5 (L) 8.6 - 10.3 mg/dL    Phosphorus 2.8 2.5 - 4.9 mg/dL    Albumin 2.8 (L) 3.4 - 5.0 g/dL   POCT GLUCOSE   Result Value Ref Range    POCT Glucose 176 (H) 74 - 99 mg/dL         Assessment/Plan      Principal Problem:    Generalized weakness  Patient is a 63 y.o. 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 Patient's son states that he noticed her becoming progressively weak on Friday 3/15/2024. No signs of adrenal insufficiency within out data as patient is hemodynamically stable and normo-glycemic; however patient is significantly altered with an unknown etiology at this point.  On second day of admission was found to be hypernatremic started D5W and patient is continuing to answer more yes or no questions.  After correction of sodium if mentation is still in decline will consider neurology consult.     #AMS  #Generalized weakness  #Secondary adrenal insufficiency  #Hypernatremia  CXR 3/19 = mild vascular congestion  CT head 3/19 = no acute pathology  MRI brain 3/20 = study affected by motion, cystic encephalomalacia (chronic), evidence of nonspecific microvascular disease  CT chest abdomen pelvis 3/20 = pneumonitis/atypical pulmonary edema; findings suggestive of colitis - no abdominal or respiratory findings that correlate on physical exam  Flu/COVID/RSV negative  CRP = 1.70, elevated  TSH = 90, normal  Ammonia = 22, normal  UA = +1 leukocyte esterase, +3 proteinuria; family reports no urinary complaints  Sodium = 148  -D5 administered for sodium correction, repeat sodium level 146  -At admission given stress dose steroids due to concern of adrenal insufficiency, 100 mg Solu-Cortef followed by 500 mg every 6 hours; regimen discontinued on day 2 of admission and patient returned to home prednisone dose equivalent as IV Solu-Cortef 80 mg daily  -Bcx no growth in 24 hours  -PT/OT, plan to evaluate patient when mental status improves  -SLP evaluated, NPO until mentation improves  -Endocrinology consulted = low suspicion for adrenal insufficiency recommends continuing home prednisone  -Palliative care consulted for goals of care     #IDDMT2 w/ history of hypoglycemia  -Insulin sliding scale  -POCT     #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  Mild/moderate MR.  Moderately elevated RVSP of 49.5, global hypokinesis of left ventricle with minor regional variations      #History of seizures  - Continue home Keppra 500 mg twice daily     #COPD not in exacerbation  - DuoNebs every 6 hours prn     DVT: Lovenox; will dc once patient is cleared to swallow  Diet: NPO, failed SLP evaluation  Consults: Palliative care  CODE: FULL     Disposition: Would benefit from long-term care at a skilled nursing facility, currently not appropriate for PT/OT evaluation.  Likely 2+ nights    Assessment and plan discussed with attending physician.  Attending addendum to follow.  Bing Muñoz D.O.  PGY-1, Internal Medicine   - [facility] - [place]              Bing Muñoz DO

## 2024-03-21 NOTE — PROGRESS NOTES
Occupational Therapy                 Therapy Communication Note-attempt    Patient Name: Bing Holliday  MRN: [id number]  Today's Date: 3/21/2024   Rm: 3105    Discipline: Occupational Therapy    Missed Time: Attempt    Comment: Received orders for OT eval 3/20. Completed chart review and attempted OT eval this am. Pt lethargic with difficulty keeping eyes open when name called. Pt was not alert enough to participate in OT eval at this time. Will hold OT eval and complete as appropriate.

## 2024-03-21 NOTE — CARE PLAN
The patient's goals for the shift include    Problem: Pain  Goal: My pain/discomfort is manageable  Outcome: Progressing     Problem: Safety  Goal: Patient will be injury free during hospitalization  Outcome: Progressing  Goal: I will remain free of falls  Outcome: Progressing     Problem: Daily Care  Goal: Daily care needs are met  Outcome: Progressing     Problem: Psychosocial Needs  Goal: Demonstrates ability to cope with hospitalization/illness  Outcome: Progressing  Goal: Collaborate with me, my family, and caregiver to identify my specific goals  Outcome: Progressing     Problem: Discharge Barriers  Goal: My discharge needs are met  Outcome: Progressing     Problem: Fall/Injury  Goal: Not fall by end of shift  Outcome: Progressing  Goal: Be free from injury by end of the shift  Outcome: Progressing  Goal: Verbalize understanding of personal risk factors for fall in the hospital  Outcome: Progressing  Goal: Verbalize understanding of risk factor reduction measures to prevent injury from fall in the home  Outcome: Progressing  Goal: Use assistive devices by end of the shift  Outcome: Progressing  Goal: Pace activities to prevent fatigue by end of the shift  Outcome: Progressing     Problem: Skin  Goal: Decreased wound size/increased tissue granulation at next dressing change  Outcome: Progressing  Flowsheets (Taken 3/21/2024 0922)  Decreased wound size/increased tissue granulation at next dressing change: Promote sleep for wound healing  Goal: Participates in plan/prevention/treatment measures  Outcome: Progressing  Flowsheets (Taken 3/21/2024 0922)  Participates in plan/prevention/treatment measures: Elevate heels  Goal: Prevent/manage excess moisture  Outcome: Progressing  Flowsheets (Taken 3/21/2024 0922)  Prevent/manage excess moisture:   Cleanse incontinence/protect with barrier cream   Moisturize dry skin  Goal: Prevent/minimize sheer/friction injuries  Outcome: Progressing  Flowsheets (Taken 3/21/2024  0922)  Prevent/minimize sheer/friction injuries:   Turn/reposition every 2 hours/use positioning/transfer devices   Complete micro-shifts as needed if patient unable. Adjust patient position to relieve pressure points, not a full turn   HOB 30 degrees or less  Goal: Promote/optimize nutrition  Outcome: Progressing  Flowsheets (Taken 3/21/2024 0922)  Promote/optimize nutrition: Discuss with provider if NPO > 2 days  Goal: Promote skin healing  Outcome: Progressing  Flowsheets (Taken 3/21/2024 0922)  Promote skin healing: Turn/reposition every 2 hours/use positioning/transfer devices       The clinical goals for the shift include pt will remain HDS this shift

## 2024-03-21 NOTE — CARE PLAN
The patient's goals for the shift include      The clinical goals for the shift include to remain HDS this shift    Over the shift, the patient did not make progress toward the following goals. Barriers to progression include . Recommendations to address these barriers include .

## 2024-03-22 DIAGNOSIS — M32.9 LUPUS (MULTI): Chronic | ICD-10-CM

## 2024-03-22 LAB
ALBUMIN SERPL BCP-MCNC: 2.7 G/DL (ref 3.4–5)
ANION GAP SERPL CALC-SCNC: 12 MMOL/L (ref 10–20)
BASOPHILS # BLD AUTO: 0.01 X10*3/UL (ref 0–0.1)
BASOPHILS NFR BLD AUTO: 0.2 %
BUN SERPL-MCNC: 33 MG/DL (ref 6–23)
CALCIUM SERPL-MCNC: 8.4 MG/DL (ref 8.6–10.3)
CHLORIDE SERPL-SCNC: 111 MMOL/L (ref 98–107)
CO2 SERPL-SCNC: 26 MMOL/L (ref 21–32)
CREAT SERPL-MCNC: 1.8 MG/DL (ref 0.5–1.05)
EGFRCR SERPLBLD CKD-EPI 2021: 32 ML/MIN/1.73M*2
EOSINOPHIL # BLD AUTO: 0 X10*3/UL (ref 0–0.7)
EOSINOPHIL NFR BLD AUTO: 0 %
ERYTHROCYTE [DISTWIDTH] IN BLOOD BY AUTOMATED COUNT: 19.6 % (ref 11.5–14.5)
GLUCOSE BLD MANUAL STRIP-MCNC: 119 MG/DL (ref 74–99)
GLUCOSE BLD MANUAL STRIP-MCNC: 132 MG/DL (ref 74–99)
GLUCOSE BLD MANUAL STRIP-MCNC: 157 MG/DL (ref 74–99)
GLUCOSE BLD MANUAL STRIP-MCNC: 167 MG/DL (ref 74–99)
GLUCOSE BLD MANUAL STRIP-MCNC: 188 MG/DL (ref 74–99)
GLUCOSE SERPL-MCNC: 177 MG/DL (ref 74–99)
HCT VFR BLD AUTO: 28.1 % (ref 36–46)
HGB BLD-MCNC: 8.5 G/DL (ref 12–16)
IGF-I SERPL-MCNC: 87 NG/ML (ref 40–244)
IGF-I Z-SCORE SERPL: -0.6
IMM GRANULOCYTES # BLD AUTO: 0.27 X10*3/UL (ref 0–0.7)
IMM GRANULOCYTES NFR BLD AUTO: 4.3 % (ref 0–0.9)
LYMPHOCYTES # BLD AUTO: 0.75 X10*3/UL (ref 1.2–4.8)
LYMPHOCYTES NFR BLD AUTO: 12 %
MAGNESIUM SERPL-MCNC: 1.68 MG/DL (ref 1.6–2.4)
MCH RBC QN AUTO: 30.7 PG (ref 26–34)
MCHC RBC AUTO-ENTMCNC: 30.2 G/DL (ref 32–36)
MCV RBC AUTO: 101 FL (ref 80–100)
MONOCYTES # BLD AUTO: 0.28 X10*3/UL (ref 0.1–1)
MONOCYTES NFR BLD AUTO: 4.5 %
NEUTROPHILS # BLD AUTO: 4.92 X10*3/UL (ref 1.2–7.7)
NEUTROPHILS NFR BLD AUTO: 79 %
NRBC BLD-RTO: 1.8 /100 WBCS (ref 0–0)
PHOSPHATE SERPL-MCNC: 2.6 MG/DL (ref 2.5–4.9)
PLATELET # BLD AUTO: 105 X10*3/UL (ref 150–450)
POTASSIUM SERPL-SCNC: 3.8 MMOL/L (ref 3.5–5.3)
RBC # BLD AUTO: 2.77 X10*6/UL (ref 4–5.2)
SODIUM SERPL-SCNC: 145 MMOL/L (ref 136–145)
WBC # BLD AUTO: 6.2 X10*3/UL (ref 4.4–11.3)

## 2024-03-22 PROCEDURE — 92610 EVALUATE SWALLOWING FUNCTION: CPT | Mod: GN | Performed by: SPEECH-LANGUAGE PATHOLOGIST

## 2024-03-22 PROCEDURE — 83735 ASSAY OF MAGNESIUM: CPT

## 2024-03-22 PROCEDURE — 82947 ASSAY GLUCOSE BLOOD QUANT: CPT

## 2024-03-22 PROCEDURE — 2500000002 HC RX 250 W HCPCS SELF ADMINISTERED DRUGS (ALT 637 FOR MEDICARE OP, ALT 636 FOR OP/ED)

## 2024-03-22 PROCEDURE — 2500000004 HC RX 250 GENERAL PHARMACY W/ HCPCS (ALT 636 FOR OP/ED)

## 2024-03-22 PROCEDURE — 1200000002 HC GENERAL ROOM WITH TELEMETRY DAILY

## 2024-03-22 PROCEDURE — 97162 PT EVAL MOD COMPLEX 30 MIN: CPT | Mod: GP

## 2024-03-22 PROCEDURE — 97112 NEUROMUSCULAR REEDUCATION: CPT | Mod: GO

## 2024-03-22 PROCEDURE — 97112 NEUROMUSCULAR REEDUCATION: CPT | Mod: GP

## 2024-03-22 PROCEDURE — 36415 COLL VENOUS BLD VENIPUNCTURE: CPT

## 2024-03-22 PROCEDURE — 80069 RENAL FUNCTION PANEL: CPT

## 2024-03-22 PROCEDURE — C9113 INJ PANTOPRAZOLE SODIUM, VIA: HCPCS

## 2024-03-22 PROCEDURE — 85025 COMPLETE CBC W/AUTO DIFF WBC: CPT

## 2024-03-22 PROCEDURE — 97165 OT EVAL LOW COMPLEX 30 MIN: CPT | Mod: GO

## 2024-03-22 PROCEDURE — 99232 SBSQ HOSP IP/OBS MODERATE 35: CPT

## 2024-03-22 PROCEDURE — 2500000001 HC RX 250 WO HCPCS SELF ADMINISTERED DRUGS (ALT 637 FOR MEDICARE OP)

## 2024-03-22 RX ORDER — MAGNESIUM SULFATE HEPTAHYDRATE 40 MG/ML
4 INJECTION, SOLUTION INTRAVENOUS ONCE
Status: COMPLETED | OUTPATIENT
Start: 2024-03-22 | End: 2024-03-22

## 2024-03-22 RX ORDER — LEVETIRACETAM 500 MG/1
500 TABLET ORAL EVERY 12 HOURS
Qty: 180 TABLET | Refills: 1 | OUTPATIENT
Start: 2024-03-22

## 2024-03-22 RX ADMIN — AMLODIPINE BESYLATE 2.5 MG: 2.5 TABLET ORAL at 09:03

## 2024-03-22 RX ADMIN — ATORVASTATIN CALCIUM 40 MG: 40 TABLET, FILM COATED ORAL at 21:14

## 2024-03-22 RX ADMIN — INSULIN LISPRO 2 UNITS: 100 INJECTION, SOLUTION INTRAVENOUS; SUBCUTANEOUS at 12:06

## 2024-03-22 RX ADMIN — THIAMINE HCL TAB 100 MG 100 MG: 100 TAB at 09:51

## 2024-03-22 RX ADMIN — PREDNISONE 20 MG: 20 TABLET ORAL at 09:03

## 2024-03-22 RX ADMIN — APIXABAN 2.5 MG: 2.5 TABLET, FILM COATED ORAL at 09:02

## 2024-03-22 RX ADMIN — MAGNESIUM SULFATE HEPTAHYDRATE 4 G: 40 INJECTION, SOLUTION INTRAVENOUS at 13:42

## 2024-03-22 RX ADMIN — INSULIN LISPRO 2 UNITS: 100 INJECTION, SOLUTION INTRAVENOUS; SUBCUTANEOUS at 09:11

## 2024-03-22 RX ADMIN — INSULIN LISPRO 2 UNITS: 100 INJECTION, SOLUTION INTRAVENOUS; SUBCUTANEOUS at 16:40

## 2024-03-22 RX ADMIN — HYDROCORTISONE SODIUM SUCCINATE 50 MG: 100 INJECTION, POWDER, FOR SOLUTION INTRAMUSCULAR; INTRAVENOUS at 04:18

## 2024-03-22 RX ADMIN — APIXABAN 2.5 MG: 2.5 TABLET, FILM COATED ORAL at 21:17

## 2024-03-22 RX ADMIN — LEVETIRACETAM 500 MG: 5 INJECTION INTRAVENOUS at 02:32

## 2024-03-22 RX ADMIN — PANTOPRAZOLE SODIUM 40 MG: 40 INJECTION, POWDER, FOR SOLUTION INTRAVENOUS at 09:07

## 2024-03-22 RX ADMIN — FOLIC ACID 1 MG: 1 TABLET ORAL at 09:51

## 2024-03-22 RX ADMIN — MYCOPHENOLATE MOFETIL 1000 MG: 250 CAPSULE ORAL at 09:04

## 2024-03-22 RX ADMIN — LEVETIRACETAM 500 MG: 500 TABLET, FILM COATED ORAL at 12:07

## 2024-03-22 RX ADMIN — MYCOPHENOLATE MOFETIL 1000 MG: 250 CAPSULE ORAL at 21:14

## 2024-03-22 ASSESSMENT — COGNITIVE AND FUNCTIONAL STATUS - GENERAL
DRESSING REGULAR UPPER BODY CLOTHING: A LOT
CLIMB 3 TO 5 STEPS WITH RAILING: TOTAL
EATING MEALS: A LITTLE
MOVING FROM LYING ON BACK TO SITTING ON SIDE OF FLAT BED WITH BEDRAILS: A LITTLE
DAILY ACTIVITIY SCORE: 12
PERSONAL GROOMING: A LITTLE
STANDING UP FROM CHAIR USING ARMS: A LOT
TURNING FROM BACK TO SIDE WHILE IN FLAT BAD: A LITTLE
DRESSING REGULAR LOWER BODY CLOTHING: TOTAL
MOVING TO AND FROM BED TO CHAIR: A LOT
HELP NEEDED FOR BATHING: TOTAL
WALKING IN HOSPITAL ROOM: A LOT
MOBILITY SCORE: 13
TOILETING: A LOT

## 2024-03-22 ASSESSMENT — PAIN - FUNCTIONAL ASSESSMENT
PAIN_FUNCTIONAL_ASSESSMENT: 0-10

## 2024-03-22 ASSESSMENT — ACTIVITIES OF DAILY LIVING (ADL): BATHING_ASSISTANCE: MAXIMAL

## 2024-03-22 ASSESSMENT — PAIN SCALES - GENERAL
PAINLEVEL_OUTOF10: 0 - NO PAIN

## 2024-03-22 NOTE — PROGRESS NOTES
Speech-Language Pathology                 Therapy Communication Note    Patient Name: Bing Holliday  MRN: [id number]  Today's Date: 3/22/2024     Discipline: Speech Language Pathology    Missed Visit Reason:      Missed Time: Attempt    Comment:  Attempted clinical swallow evaluation. Patient with PT/OT. Will attempt again as schedule permits.

## 2024-03-22 NOTE — PROGRESS NOTES
03/22/24 1307   Discharge Planning   Living Arrangements Children   Support Systems Children   Assistance Needed Discharge planning and placement   Type of Residence Private residence   Number of Stairs to Enter Residence 0   Number of Stairs Within Residence 24   Home or Post Acute Services Post acute facilities (Rehab/SNF/etc)   Type of Post Acute Facility Services Skilled nursing   Does the patient need discharge transport arranged? Yes   RoundTrip coordination needed? Yes   Has discharge transport been arranged? No     Received referral to assist with placement and Medicaid.  Pt has in the past been at [facility] for SNF rehab several times. At this time pt is unable to return to [facility] because she has an unpaid balance from co pay days while there and they denied her return.  This writer discussing options with son López Lang ([phone]).  Explained Medicaid qualifications and application process.  Discussed [employer] coverage and need for precert in order to go to SNF with payment.      Contacted TITI Davies at [facility] to check on days utilized, out of pocket costs, co pay's and days remaining.  Samson shared that the  states that the pt has met her out of pocket maximum of $4500 and still has 42 skilled days remaining.  Discussed pt long term plan as son is realizing pt may not be able to return home to his care as he is returning to on site work and thus there will not be someone home at all times to be with the pt.     Discussed placement prospects and pt verified application to [facility] and [facility].  [facility] sent message to please send referral. This worker also contacted [facility] Liaison to discuss patient.      3:09pm  Patient has been accepted at [facility] which is FOC.  Message sent to  [facility] Precert team apply for [employer] precert for SNF placement.  Son is going to facility to discuss Medicaid and obtain paperwork to get  process started in event LTC is needed.  Awaiting precert.

## 2024-03-22 NOTE — PROGRESS NOTES
"    Endocrinology Inpatient Consult Progress Note     PATIENT NAME: Bing Holliday  MRN: [id number]  DATE: 3/22/2024    CONSULTING PHYSICIAN: Dr. Haywood  REASON FOR CONSULT: AI. T2DM. Hypoglycemia.      Interval Events     Feeling much better.  Patient is much more interactive today.  She is alert and oriented x 3.  She states that she is still a bit tired.  I asked her about her prednisone.  The patient was able to tell me that she takes 20 mg of prednisone daily.  She denies missing any doses when she was at home.      Physical Examination     /83 (BP Location: Right arm, Patient Position: Lying)   Pulse 54   Temp 36.2 °C (97.2 °F) (Temporal)   Resp 18   Ht 1.676 m (5' 5.98\")   Wt 96.5 kg (212 lb 11.9 oz)   LMP  (LMP Unknown)   SpO2 98%   BMI 34.35 kg/m²   No acute distress.  Much more interactive than previous.  Appropriate affect.  Frontal alopecia.  Regular rate and rhythm.  Nonlabored aspiration.  Soft nontender nondistended abdomen.  No pedal edema bilaterally.      Medications     Reviewed MAR       Data     Recent Labs and Imaging Reviewed      Assessment / Plan        # Adrenal Insufficiency  Was to be started on prednisone yesterday but then she was maintained on IV hydrocortisone due to mental status.  The patient's constitutional signs as well as mental status have improved quite greatly.  She is able to tolerate oral medications.  She has been transition to prednisone 20 mg daily.    I cannot argue with the fact that the patient improved with hydrocortisone.  It is unclear to me why her hypothermia and bradycardia improved with glucocorticoids.  My hypothesis is that this patient has at least secondary adrenal insufficiency and should be maintained on glucocorticoids.  The patient endorses compliance with her glucocorticoids at home.  I am unclear why she decompensates when she goes home.    This patient has multiple readmissions.  I really would like to keep her on prednisone 20 mg for " a couple of days and see how she does.  If she is stable on this dose while admitted, there should be no reason why she decompensates at home secondary to her adrenal status.  I have discussed this case with multiple members of the hospital medicine team during her multiple admissions.  It remains to be a puzzling case.    # Hypoglycemia  She was hyperglycemic on arrival.  She has presented with hypoglycemia couple times this year.  During her last hospitalization this did not recur.  Her sugars in the last 48 hours have been at goal.     # Uncontrolled Type 2 Diabetes Mellitus  Home Regimen: None.  Hemoglobin A1c (7/23): 6.9%  Nutrtion: Regular Diet.     She tends to get hyperglycemic on high doses of steroids.  I think sliding scale insulin with meals and at bedtime is reasonable for now.     # Osteoporosis  In the setting of chronic glucocorticoid use.  This will be further managed as an outpatient.  She probably would benefit from a bisphosphonate, these agents are best studied with steroid-induced adynamic bone disease.         Avi Sloan, DO  Endocrinology, Diabetes, and Metabolism    Available via [employer] Messenger    Please excuse any typographical or unwanted errors within this documentation as voice recognition software was used to dictate this note.

## 2024-03-22 NOTE — PROGRESS NOTES
"Bing Holliday is a 63 y.o. female on day 1 of admission presenting with Generalized weakness.      Subjective   Patient was seen and examined bedside today.  She was sitting up in bed smiling answering questions with full sentences.  She was alert and oriented to self place but not time, however was self aware she did not know the right year.  She stated she recalls the entire hospital stay and reports her initial desire to come to the hospital was due to \"not feeling myself.\"  She did not complain of any symptoms today and did permit an entire physical examination with no grimacing or groans.  Hopes to return back to her son's home at discharge.       Objective     Last Recorded Vitals  /84 (BP Location: Right arm, Patient Position: Lying)   Pulse (!) 46   Temp 36 °C (96.8 °F) (Temporal)   Resp 16   Wt 96.5 kg (212 lb 11.9 oz)   SpO2 96%   Intake/Output last 3 Shifts:    Intake/Output Summary (Last 24 hours) at 3/22/2024 1102  Last data filed at 3/22/2024 0243  Gross per 24 hour   Intake 861.67 ml   Output 900 ml   Net -38.33 ml       Admission Weight  Weight: 96.5 kg (212 lb 11.9 oz) (03/19/24 2221)    Daily Weight  03/19/24 : 96.5 kg (212 lb 11.9 oz)    Image Results  MR brain w and wo IV contrast  Narrative: Interpreted By:  Jeri Smith,   STUDY:  MR BRAIN W AND WO IV CONTRAST;  3/20/2024 10:34 pm      INDICATION:  Signs/Symptoms:encephalopathy.      COMPARISON:  MRI of the brain dated 01/05/2024; same day noncontrast CT head      ACCESSION NUMBER(S):  [id number]      ORDERING CLINICIAN:  BING MAHER      TECHNIQUE:  Multiplanar and multisequence MRI of the brain was performed before  and after intravenous administration of 20 mL of Dotarem gadolinium  contrast.      FINDINGS:  Exam is degraded by motion.      No diffusion restriction abnormality is present to suggest an acute  infarct.      There is no imaging evidence to suggest acute intracranial  hemorrhage. No mass effect or " midline shift is evident.      Hemosiderin staining is present within the area of cystic  encephalomalacia and gliosis in the left occipital lobe, likely  representing sequela of prior infarct/injury.      No ventricular dilatation is evident. Basal cisterns are patent. No  extra-axial fluid collections are identified.      Patchy and confluence areas of hyperintense FLAIR and T2 signal are  present in the periventricular and subcortical white matter of  bilateral cerebral hemispheres, nonspecific findings favored to  represent sequela of microvascular disease.      No pathologic intracranial enhancement is identified.      Visualized large arterial flow voids, including intracranial carotid  and basilar artery appear preserved.      Mild mucosal thickening is present in the inferior maxillary sinuses  bilaterally. No abnormal fluid signal is present in the mastoid air  cells bilaterally.      Impression: 1. Within limitations of somewhat motion degraded study, no evidence  of new intracranial infarct, hemorrhage or pathologic enhancement.  2. Geographic area of cystic encephalomalacia and gliosis present in  the left occipital lobe likely represents chronic sequela of prior  infarct/injury, similar in appearance to prior exam.  3. Patchy and confluence areas of hyperintense FLAIR and T2 signal  are present in the periventricular and subcortical white matter of  bilateral cerebral hemispheres, nonspecific findings favored to  represent sequela of microvascular disease.      MACRO:  None      Signed by: Jeri Smith 3/20/2024 11:57 PM  Dictation workstation:   [id number]  CT chest abdomen pelvis wo IV contrast  Narrative: Interpreted By:  Richie Garcia,   STUDY:  CT CHEST ABDOMEN PELVIS WO CONTRAST;  3/20/2024 6:16 pm      INDICATION:  Signs/Symptoms:sepsis      COMPARISON:  CT of the chest, abdomen, and pelvis 01/14/2024      ACCESSION NUMBER(S):  [id number]      ORDERING CLINICIAN:  LISBET MAHER       TECHNIQUE:  CT of the chest, abdomen, and pelvis was performed.  Volumetric CT imaging through the chest, abdomen, and pelvis without  contrast.. Multiplanar reconstructions were processed on a separate  workstation.      FINDINGS:  CHEST:      Motion limited examination. Asymmetric mixed reticular and airspace  opacities of the right greater left central upper lobes and central  right and left lower lobes suggestive of either pneumonitis or  atypical pulmonary edema. No pneumothorax or effusion.      The thoracic aorta is unchanged with respect course, caliber contour.      The main pulmonary artery and its branches are unchanged with respect  course, caliber, and contour.      Cardiomegaly with severe coronary atherosclerotic calcifications and  probable background of coronary stents. Relative low density of  intravascular volume with respect to the vessel wall suggestive of  anemia. Osseous structures appear stable with chronic appearing  compression irregularity of T7. Similar appearing background of  multilevel spondylosis. Minimal step-off of the mid sternum, presumed  secondary to motion artifact (series 500, image 72).      ABDOMEN:      Motion limited examination.      The nonenhanced liver, spleen, bilateral adrenal glands, pancreas,  and bilateral kidneys are grossly unchanged. No abnormal dilatation  of large or small bowel. Colonic diverticulosis. No obvious acute  diverticulitis is detected. The lower pelvis is suboptimally assessed  due to extensive beam hardening artifact from right hip arthroplasty.  The urinary bladder and lower pelvic organs are not adequately  assessed. There is significant wall thickening of the ascending colon  with minimal adjacent reticular changes. No evidence of abdominal  aortic aneurysm. Osseous structures appear stable. Chronic appearing  height loss of the L1 and L2 vertebral bodies. Severe multilevel  spondylosis.      Impression: CHEST  1.  Motion limited  examination.  2. Relatively central mixed reticular and airspace opacities  suggestive either multifocal pneumonitis or atypical pulmonary edema.  3. Remaining intrathoracic findings appear stable since comparison CT  imaging 01/14/2024.      ABDOMEN - PELVIS  1.  Nonspecific wall thickening of the ascending colon suggestive of  a nonspecific colitis. Correlate for infectious, inflammatory, or  ischemic etiologies.  2. Colonic diverticulosis without evidence of acute diverticulitis.  3. Remaining findings appear stable since comparison CT imaging  01/14/2024.          MACRO:  None      Signed by: Richie Garcia 3/20/2024 8:02 PM  Dictation workstation:   [id number]  ECG 12 lead  Sinus bradycardia  Voltage criteria for left ventricular hypertrophy  Nonspecific T wave abnormality  Abnormal ECG  When compared with ECG of 09-MAR-2024 18:13,  Vent. rate has decreased BY  36 BPM  Nonspecific T wave abnormality no longer evident in Anterior leads  ECG 12 lead  Sinus bradycardia  Voltage criteria for left ventricular hypertrophy  Nonspecific T wave abnormality  Abnormal ECG  When compared with ECG of 19-MAR-2024 22:44, (unconfirmed)  No significant change was found  CT head wo IV contrast  Narrative: Interpreted By:  Anselmo Chan,   STUDY:  CT HEAD WO IV CONTRAST;  3/20/2024 3:31 am      INDICATION:  Signs/Symptoms:AMS.      COMPARISON:  CT scan of the head 03/09/2024      ACCESSION NUMBER(S):  [id number]      ORDERING CLINICIAN:  GOMEZ CHAMBERS      TECHNIQUE:  Axial noncontrast CT images of the head.      FINDINGS:  BRAIN PARENCHYMA: Stable focal area of encephalomalacia in the left  occipital lobe likely sequela of remote infarct. Gray-white matter  interfaces are otherwise preserved. No mass, mass effect or midline  shift. Mild deep and periventricular white matter hypodensities are  nonspecific, but favored to represent chronic small vessel ischemic  changes. Calcifications bilateral basal ganglia are  likely  physiologic.      HEMORRHAGE: No acute intracranial hemorrhage.  VENTRICLES and EXTRA-AXIAL SPACES: Mild volume loss with prominence  of the ventricles and sulci. EXTRACRANIAL SOFT TISSUES: Within normal  limits. PARANASAL SINUSES/MASTOIDS: The visualized paranasal sinuses  and mastoid air cells are aerated. CALVARIUM: No depressed skull  fracture. No destructive osseous lesion.      OTHER FINDINGS: Atherosclerotic calcification of the carotid siphons  and vertebral arteries..      Impression: No acute intracranial abnormality. If symptoms persist or there is  high clinical suspicion further evaluation with MRI can be considered.      Chronic changes as noted above.      MACRO:  None      Signed by: Anselmo Chan 3/20/2024 3:40 AM  Dictation workstation:   [id number]      Physical Exam  HENT:      Mouth/Throat:      Mouth: Mucous membranes are moist.      Pharynx: Oropharynx is clear.   Eyes:      Conjunctiva/sclera: Conjunctivae normal.      Pupils: Pupils are equal, round, and reactive to light.   Cardiovascular:      Rate and Rhythm: Normal rate and regular rhythm.      Heart sounds: Normal heart sounds.   Pulmonary:      Effort: Pulmonary effort is normal.      Breath sounds: Normal breath sounds.   Musculoskeletal:      Right lower leg: No edema.      Left lower leg: No edema.   Skin:     Findings: Rash (facial rash unchanged from prior exam) present.   Neurological:      Mental Status: She is alert.      Comments: Alert and oriented x 2  (self, place, not time), some word finding difficulty, slowed speech   Psychiatric:         Mood and Affect: Mood normal.         Relevant Results  Scheduled medications  amLODIPine, 2.5 mg, oral, Daily  apixaban, 2.5 mg, oral, BID  atorvastatin, 40 mg, oral, Daily  folic acid, 1 mg, oral, Daily  insulin lispro, 0-10 Units, subcutaneous, q4h  levETIRAcetam, 500 mg, oral, q12h  mycophenolate, 1,000 mg, oral, BID  pantoprazole, 40 mg, intravenous, Daily  predniSONE, 20  mg, oral, Daily  thiamine, 100 mg, oral, Daily      Continuous medications     PRN medications  PRN medications: dextrose, dextrose, diclofenac sodium, glucagon, ipratropium-albuteroL, melatonin    Results for orders placed or performed during the hospital encounter of 03/19/24 (from the past 24 hour(s))   POCT GLUCOSE   Result Value Ref Range    POCT Glucose 137 (H) 74 - 99 mg/dL   Renal function panel   Result Value Ref Range    Glucose 201 (H) 74 - 99 mg/dL    Sodium 146 (H) 136 - 145 mmol/L    Potassium 4.0 3.5 - 5.3 mmol/L    Chloride 112 (H) 98 - 107 mmol/L    Bicarbonate 27 21 - 32 mmol/L    Anion Gap 11 10 - 20 mmol/L    Urea Nitrogen 41 (H) 6 - 23 mg/dL    Creatinine 2.06 (H) 0.50 - 1.05 mg/dL    eGFR 27 (L) >60 mL/min/1.73m*2    Calcium 8.5 (L) 8.6 - 10.3 mg/dL    Phosphorus 2.8 2.5 - 4.9 mg/dL    Albumin 2.8 (L) 3.4 - 5.0 g/dL   POCT GLUCOSE   Result Value Ref Range    POCT Glucose 176 (H) 74 - 99 mg/dL   POCT GLUCOSE   Result Value Ref Range    POCT Glucose 182 (H) 74 - 99 mg/dL   POCT GLUCOSE   Result Value Ref Range    POCT Glucose 170 (H) 74 - 99 mg/dL   POCT GLUCOSE   Result Value Ref Range    POCT Glucose 132 (H) 74 - 99 mg/dL   Renal function panel   Result Value Ref Range    Glucose 177 (H) 74 - 99 mg/dL    Sodium 145 136 - 145 mmol/L    Potassium 3.8 3.5 - 5.3 mmol/L    Chloride 111 (H) 98 - 107 mmol/L    Bicarbonate 26 21 - 32 mmol/L    Anion Gap 12 10 - 20 mmol/L    Urea Nitrogen 33 (H) 6 - 23 mg/dL    Creatinine 1.80 (H) 0.50 - 1.05 mg/dL    eGFR 32 (L) >60 mL/min/1.73m*2    Calcium 8.4 (L) 8.6 - 10.3 mg/dL    Phosphorus 2.6 2.5 - 4.9 mg/dL    Albumin 2.7 (L) 3.4 - 5.0 g/dL   Magnesium   Result Value Ref Range    Magnesium 1.68 1.60 - 2.40 mg/dL   CBC and Auto Differential   Result Value Ref Range    WBC 6.2 4.4 - 11.3 x10*3/uL    nRBC 1.8 (H) 0.0 - 0.0 /100 WBCs    RBC 2.77 (L) 4.00 - 5.20 x10*6/uL    Hemoglobin 8.5 (L) 12.0 - 16.0 g/dL    Hematocrit 28.1 (L) 36.0 - 46.0 %     (H) 80 -  100 fL    MCH 30.7 26.0 - 34.0 pg    MCHC 30.2 (L) 32.0 - 36.0 g/dL    RDW 19.6 (H) 11.5 - 14.5 %    Platelets 105 (L) 150 - 450 x10*3/uL    Neutrophils % 79.0 40.0 - 80.0 %    Immature Granulocytes %, Automated 4.3 (H) 0.0 - 0.9 %    Lymphocytes % 12.0 13.0 - 44.0 %    Monocytes % 4.5 2.0 - 10.0 %    Eosinophils % 0.0 0.0 - 6.0 %    Basophils % 0.2 0.0 - 2.0 %    Neutrophils Absolute 4.92 1.20 - 7.70 x10*3/uL    Immature Granulocytes Absolute, Automated 0.27 0.00 - 0.70 x10*3/uL    Lymphocytes Absolute 0.75 (L) 1.20 - 4.80 x10*3/uL    Monocytes Absolute 0.28 0.10 - 1.00 x10*3/uL    Eosinophils Absolute 0.00 0.00 - 0.70 x10*3/uL    Basophils Absolute 0.01 0.00 - 0.10 x10*3/uL   POCT GLUCOSE   Result Value Ref Range    POCT Glucose 188 (H) 74 - 99 mg/dL     MR brain w and wo IV contrast    Result Date: 3/20/2024  Interpreted By:  Jeri Smith, STUDY: MR BRAIN W AND WO IV CONTRAST;  3/20/2024 10:34 pm   INDICATION: Signs/Symptoms:encephalopathy.   COMPARISON: MRI of the brain dated 01/05/2024; same day noncontrast CT head   ACCESSION NUMBER(S): [id number]   ORDERING CLINICIAN: LISBET MAHER   TECHNIQUE: Multiplanar and multisequence MRI of the brain was performed before and after intravenous administration of 20 mL of Dotarem gadolinium contrast.   FINDINGS: Exam is degraded by motion.   No diffusion restriction abnormality is present to suggest an acute infarct.   There is no imaging evidence to suggest acute intracranial hemorrhage. No mass effect or midline shift is evident.   Hemosiderin staining is present within the area of cystic encephalomalacia and gliosis in the left occipital lobe, likely representing sequela of prior infarct/injury.   No ventricular dilatation is evident. Basal cisterns are patent. No extra-axial fluid collections are identified.   Patchy and confluence areas of hyperintense FLAIR and T2 signal are present in the periventricular and subcortical white matter of bilateral  cerebral hemispheres, nonspecific findings favored to represent sequela of microvascular disease.   No pathologic intracranial enhancement is identified.   Visualized large arterial flow voids, including intracranial carotid and basilar artery appear preserved.   Mild mucosal thickening is present in the inferior maxillary sinuses bilaterally. No abnormal fluid signal is present in the mastoid air cells bilaterally.       1. Within limitations of somewhat motion degraded study, no evidence of new intracranial infarct, hemorrhage or pathologic enhancement. 2. Geographic area of cystic encephalomalacia and gliosis present in the left occipital lobe likely represents chronic sequela of prior infarct/injury, similar in appearance to prior exam. 3. Patchy and confluence areas of hyperintense FLAIR and T2 signal are present in the periventricular and subcortical white matter of bilateral cerebral hemispheres, nonspecific findings favored to represent sequela of microvascular disease.   MACRO: None   Signed by: Jeri Smith 3/20/2024 11:57 PM Dictation workstation:   [id number]    CT chest abdomen pelvis wo IV contrast    Result Date: 3/20/2024  Interpreted By:  Richie Garcia, STUDY: CT CHEST ABDOMEN PELVIS WO CONTRAST;  3/20/2024 6:16 pm   INDICATION: Signs/Symptoms:sepsis   COMPARISON: CT of the chest, abdomen, and pelvis 01/14/2024   ACCESSION NUMBER(S): [id number]   ORDERING CLINICIAN: LISBET MAHER   TECHNIQUE: CT of the chest, abdomen, and pelvis was performed. Volumetric CT imaging through the chest, abdomen, and pelvis without contrast.. Multiplanar reconstructions were processed on a separate workstation.   FINDINGS: CHEST:   Motion limited examination. Asymmetric mixed reticular and airspace opacities of the right greater left central upper lobes and central right and left lower lobes suggestive of either pneumonitis or atypical pulmonary edema. No pneumothorax or effusion.   The thoracic aorta is  unchanged with respect course, caliber contour.   The main pulmonary artery and its branches are unchanged with respect course, caliber, and contour.   Cardiomegaly with severe coronary atherosclerotic calcifications and probable background of coronary stents. Relative low density of intravascular volume with respect to the vessel wall suggestive of anemia. Osseous structures appear stable with chronic appearing compression irregularity of T7. Similar appearing background of multilevel spondylosis. Minimal step-off of the mid sternum, presumed secondary to motion artifact (series 500, image 72).   ABDOMEN:   Motion limited examination.   The nonenhanced liver, spleen, bilateral adrenal glands, pancreas, and bilateral kidneys are grossly unchanged. No abnormal dilatation of large or small bowel. Colonic diverticulosis. No obvious acute diverticulitis is detected. The lower pelvis is suboptimally assessed due to extensive beam hardening artifact from right hip arthroplasty. The urinary bladder and lower pelvic organs are not adequately assessed. There is significant wall thickening of the ascending colon with minimal adjacent reticular changes. No evidence of abdominal aortic aneurysm. Osseous structures appear stable. Chronic appearing height loss of the L1 and L2 vertebral bodies. Severe multilevel spondylosis.       CHEST 1.  Motion limited examination. 2. Relatively central mixed reticular and airspace opacities suggestive either multifocal pneumonitis or atypical pulmonary edema. 3. Remaining intrathoracic findings appear stable since comparison CT imaging 01/14/2024.   ABDOMEN - PELVIS 1.  Nonspecific wall thickening of the ascending colon suggestive of a nonspecific colitis. Correlate for infectious, inflammatory, or ischemic etiologies. 2. Colonic diverticulosis without evidence of acute diverticulitis. 3. Remaining findings appear stable since comparison CT imaging 01/14/2024.     MACRO: None   Signed by:  Richie Tracyin 3/20/2024 8:02 PM Dictation workstation:   [id number]       Assessment/Plan        Principal Problem:    Generalized weakness    Patient is a 63 y.o. 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 Patient's son states that he noticed her becoming progressively weak on Friday 3/15/2024. No signs of adrenal insufficiency within out data as patient is hemodynamically stable and normo-glycemic; however patient is significantly altered with an unknown etiology at this point.  On second day of admission was found to be hypernatremic started D5W and patient is continuing to answer more yes or no questions.  On third day of admission and completion of 24 hours of D5 and stress dose steroids, patient was alert and oriented x 2, sitting up in bed and answering questions.  Resumed on home p.o. medications and is currently awaiting dispo planning.    #AMS, resolving  #Generalized weakness  #Secondary adrenal insufficiency  #Hypernatremia, resolved  CXR 3/19 = mild vascular congestion  CT head 3/19 = no acute pathology  MRI brain 3/20 = study affected by motion, cystic encephalomalacia (chronic), evidence of nonspecific microvascular disease  CT chest abdomen pelvis 3/20 = pneumonitis/atypical pulmonary edema; findings suggestive of colitis - no abdominal or respiratory findings that correlate on physical exam  Flu/COVID/RSV negative  CRP = 1.70, elevated  TSH = 90, normal  Ammonia = 22, normal  UA = +1 leukocyte esterase, +3 proteinuria; family reports no urinary complaints  -Sodium level peaked at 148, and resolved overnight with D5  -At admission given stress dose steroids due to concern of adrenal insufficiency, discontinued on 3/22; started on home prednisone 20mg daily  -Bcx no growth in 48  hours  -PT/OT, plan to evaluate patient when mental status improves  -SLP consulted, however on initial exam patient was lethargic; pt now appropriate for evaluation, bedside swallow passed  -Endocrinology consulted = low suspicion for adrenal insufficiency recommends continuing home prednisone  -Palliative care consulted for goals of care     #IDDMT2 w/ history of hypoglycemia  -Insulin sliding scale  -POCT     #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  Mild/moderate MR.  Moderately elevated RVSP of 49.5, global hypokinesis of left ventricle with minor regional variations   -resume home amlodipine, lipitor     #History of seizures  - Continue home Keppra     #COPD not in exacerbation  - DuoNebs every 6 hours prn    #GERD  - continue home protonix    DVT: Eliquis  Diet: NPO except for sips of water with meds, until reevaluated by SLP; passed bedside swallow  Consults: Palliative care  CODE: FULL     Disposition: Would benefit from long-term care at a skilled nursing facility, pending PT/OT evaluation; likely 2+ nights     Assessment and plan discussed with attending physician.  Attending addendum to follow.  Bing Muñoz D.O.  PGY-1, Internal Medicine   - [facility]              Bing Muñoz DO

## 2024-03-22 NOTE — CONSULTS
Reason For Consult  Goals of care    History Of Present Illness  Bing Holliday is a 63 y.o. 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       Assessment/Plan   Palliative care met with pt at bedside, no family present. Pt Is A/Ox2, did not know the date. Pt has a flat affect with visual hallucinations, states to this writer that there was another girl in the room with us and asks me if she is real or just a vision. Informed pt that there was no one else in the room except her and I. After introducing palliative care pt becomes tearful and sad. She feels that her kids are giving up on her. I explained that they want to make sure she is safe and their intentions to have go to a facility are for her safety and well being as they cannot care for her anymore. Pt remains solemn and tearful She does this writer permission to call her son. After further review, pts son is working with  to start the medicaid process. Spoke with son López over the phone, he is working with Veronica Mosqueda to get this done. We discussed palliative services and having to wait for pt to have medicaid before engaging in services as palliative care right now would have a co pay and the pt cannot afford it at this time. López understands options are limited until medicaid is obtained. Palliative information given.    Will sign off at this time.  Should pt get a pending medicaid number this admission, please re consult as a palliative referral can be made at that time and these are the [facility]'s wishes.      Ainsley Aviles RN

## 2024-03-22 NOTE — PROGRESS NOTES
Occupational Therapy    Occupational Therapy    Evaluation    Patient Name: Bing Holliday  MRN: [id number]  Today's Date: 3/22/2024  Time Calculation  Start Time: 1211  Stop Time: 1230  Time Calculation (min): 19 min  Rm: 3123    Assessment  IP OT Assessment  OT Assessment: Pt pleasant and cooperative. Pt requires increased assist with ADL's and mobility. Recommend SNF to increase safety and independence with ADL's.  Prognosis: Good  End of Session Communication: Bedside nurse  End of Session Patient Position: Bed, 3 rail up, Alarm on (in chair mode)    Plan:  Treatment Interventions: ADL retraining, Functional transfer training, UE strengthening/ROM, Endurance training, Neuromuscular reeducation  OT Frequency: 3 times per week  OT Discharge Recommendations: Moderate intensity level of continued care (SNF)  Equipment Recommended upon Discharge: Wheeled walker, Wheelchair  OT Recommended Transfer Status: Moderate assist, Assist of 2  OT - OK to Discharge: Yes (to next level of care)    Subjective     Current Problem:  1. Generalized weakness            General:  General  Reason for Referral: ADL's Safety Assessment  Referred By: Lillian Vazquez  Past Medical History Relevant to Rehab: SLE, Jaccoud's arthopathy, adrenal insuff, CKD, GERD, CAD s/p CABG, h/o AAA  Missed Visit: Yes  Co-Treatment: PT  Prior to Session Communication: Bedside nurse  Patient Position Received: Bed, 3 rail up, Alarm on    General Visit Information:  Per EMR: pt presents from home with generalized weakness. Patient's son states that he noticed her becoming progressively weak on Friday 3/15/2024. On exam, patient is significantly altered and is unable to stay awake or participate in obtaining historical information. Attempted to call son at time of admission, but did not answer. Will attempt again later.     Recently admitted to the hospital on 3/9/2024 for hypoglycemia, hypothermia that was a suspected adrenal insufficiency where she was  treated with hydrocortisone stress steroids.  Endocrinology was consulted at that time who recommended discharging patient on 20 mg prednisone to be taken once daily.     In the ED: Vitals were 98.2 T, 64 HR, 20 RR, 122/75 mmhg, 99% on RA. Labs showed a glucose of 185, creatinine 1.96, hemoglobin 8.7, and troponin 22 and 23 trended.  CXR shows mild pulmonary vascular congestion. No treatments provided.    Precautions:  Medical Precautions: Fall precautions, Seizure precautions  Precautions Comment: bed/chair alarm    Pain:  Pain Assessment  Pain Assessment: 0-10  Pain Score: 0 - No pain    Objective     Cognition:  Overall Cognitive Status: Within Functional Limits  Orientation Level: Oriented X4             Home Living:        Prior Function:       IADL History:       ADL:  Eating Assistance:  (setup)  Grooming Assistance: Stand by  Bathing Assistance: Maximal  UE Dressing Assistance: Moderate  LE Dressing Assistance: Maximal  Toileting Assistance with Device: Maximal    Activity Tolerance:  Endurance: Decreased tolerance for upright activites    Home Living/PLOF:  Pt lives with son in house with 3 FATMATA.  15 steps up to WIS with chair.  Family assist pt upstairs to shower, otherwise pt stays on main level.  Pt states she uses FWW and WC, but mainly just transfers to/from WC and can do so independently.    Sitting Balance:  Static Sitting Balance  Static Sitting-Comment/Number of Minutes: fair  Dynamic Sitting Balance  Dynamic Sitting-Comments: fair-    Standing Balance:  Static Standing Balance  Static Standing-Comment/Number of Minutes: fair-  Dynamic Standing Balance  Dynamic Standing-Comments: poor    Sensation:  Sensation Comment: denies any numbness/tingling    Strength:  Strength Comments: B UE's WFL    Perception:       Coordination:  Impaired-B hands with arthritic changes and flexion contractures in most digits       Extremities: RUE   RUE : Within Functional Limits and LUE   LUE: Within Functional  Limits    Outcome Measures: [facility] Daily Activity  Putting on and taking off regular lower body clothing: Total  Bathing (including washing, rinsing, drying): Total  Putting on and taking off regular upper body clothing: A lot  Toileting, which includes using toilet, bedpan or urinal: A lot  Taking care of personal grooming such as brushing teeth: A little  Eating Meals: A little  Daily Activity - Total Score: 12     EDUCATION:  Education  Individual(s) Educated: Patient  Education Provided:  (safety)  Education Documentation  Body Mechanics, taught by Candace Hopson OT at 3/22/2024  2:32 PM.  Learner: Patient  Readiness: Acceptance  Method: Explanation  Response: Verbalizes Understanding    Precautions, taught by Candace Hopson OT at 3/22/2024  2:32 PM.  Learner: Patient  Readiness: Acceptance  Method: Explanation  Response: Verbalizes Understanding    Education Comments  No comments found.        Goals:   Encounter Problems       Encounter Problems (Active)       OT Goals       OT Goal 1 (Progressing)       Start:  03/22/24    Expected End:  04/04/24       Pt will complete all bed mobility with supervision safely            OT Goal 2 (Progressing)       Start:  03/22/24    Expected End:  04/04/24       Pt with complete all transfers safely with Min A safely           OT Goal 3 (Progressing)       Start:  03/22/24    Expected End:  04/04/24       Pt will complete ADL's and mobility with good sit balance and fair+ stand balance            OT Goal 4 (Progressing)       Start:  03/22/24    Expected End:  04/04/24       Pt will complete UB dressing ADL's with SBA using adaptive device(s) as needed           OT Goal 5 (Progressing)       Start:  03/22/24    Expected End:  04/04/24       Pt with complete grooming ADL's with supervision after setup                Treatment: Pt was able to complete supine-sit with SBA and increased effort. Pt with fair sit balance on EOB. Pt completed all transfers with Mod A of 2. Pt was  not able to ambulate or side step at this time. Pt returned to sitting EOB, and was then able to complete sit-supine with SBA. Pt was also able to reposition self with bed flat. Pt remained in bed with seizure pads on bed rails, bed alarm on and call light within reach.

## 2024-03-22 NOTE — PROGRESS NOTES
03/22/24 1055   Discharge Planning   Patient expects to be discharged to: TBD     Referral to Veronica Ortiz,. LSW to see patient for possible medicaid.

## 2024-03-22 NOTE — PROGRESS NOTES
Physical Therapy    Physical Therapy Evaluation    Patient Name: Bing Holliday  MRN: [id number]  Today's Date: 3/22/2024   Time Calculation  Start Time: 1210  Stop Time: 1229  Time Calculation (min): 19 min  3122    Assessment/Plan   PT Assessment: Pt demonstrates decreased strength, decreased ROM, decreased endurance, decreased activity tolerance, and impaired balance/mobility.  Pt appears below baseline level of function and based on current level of function, pt would benefit from continued skilled therapy while in the hospital to ensure safety, decrease risk of falls, and regains strength/mobility back to baseline.  Once stable enough for discharge, pt would benefit from moderate intensity therapy prior to returning home.     PT Assessment Results: Decreased strength, Decreased range of motion, Decreased endurance, Impaired balance, Decreased mobility, Decreased safety awareness  Rehab Prognosis: Good  Evaluation/Treatment Tolerance: Patient limited by fatigue, Patient tolerated treatment well  Medical Staff Made Aware: Yes  End of Session Communication: Bedside nurse  End of Session Patient Position: Bed, 3 rail up, Alarm on (in chair mode)  IP OR SWING BED PT PLAN  Inpatient or Swing Bed: Inpatient  PT Plan  Treatment/Interventions: Bed mobility, Transfer training, Gait training, Stair training, Balance training, Neuromuscular re-education, Strengthening, Therapeutic exercise, Therapeutic activity, Range of motion  PT Plan: Skilled PT  PT Frequency: 3 times per week  PT Discharge Recommendations: Moderate intensity level of continued care  Equipment Recommended upon Discharge: Wheeled walker, Wheelchair  PT Recommended Transfer Status: Assist x2  PT - OK to Discharge: Yes - To next level of care when cleared by medical team    Subjective     Current Problem:  Patient Active Problem List   Diagnosis    COVID-19    Lupus (CMS/[facility])    Ambulatory dysfunction    Myelodysplastic syndrome, unspecified (CMS/[facility])     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HCC)    Hyperlipidemia    Pneumonia of both lower lobes due to infectious organism    Hallucinations    Leg swelling    Hyperglycemia    JED (acute kidney injury) (CMS/[facility])    Hypernatremia    Proliferative diabetic retinopathy of right eye without macular edema determined by examination associated with type 2 diabetes mellitus (CMS/[facility])    Urinary incontinence    Paraparesis (CMS/[facility])    Abdominal cramping    Abnormal echocardiogram    Abnormal gait    Abnormal thyroid blood test    Advanced COPD (CMS/[facility])    Afferent pupillary defect of right eye    Anemia    Pancytopenia (CMS/[facility])    Altitudinal scotoma of right eye    Arcuate scotoma of left eye    Arthropathy    Asymptomatic PVCs    PAF (paroxysmal atrial fibrillation) (CMS/[facility])    Balance problem    Bilateral high frequency sensorineural hearing loss    Bradycardia    Branch retinal artery occlusion    Cardiomyopathy (CMS/[facility])    Chronic diarrhea    Chronic fatigue    Chronic kidney disease (CKD), stage III (moderate) (CMS/[facility])    CKD stage G3a/A2, GFR 45-59 and albumin creatinine ratio  mg/g (CMS/[facility])    Combined forms of age-related cataract of left eye    Combined forms of age-related cataract of right eye    Contracture of hand    Snoring    CVA (cerebral vascular accident) (CMS/[facility])    Daytime somnolence    Degeneration of lumbar/lumbosacral disc without myelopathy    Depression, major    Dizziness    Dupuytren's contracture    Eczema    Elevated alkaline phosphatase level    Encephalopathy acute    Facial twitching    Fibromyalgia    Fungal nail infection    GERD (gastroesophageal reflux disease)    Gouty arthropathy    H/O iritis    H/O orthostatic hypotension    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[facility])    High level of cardiac marker    Hip hematoma, right    Hypothermia    Insomnia    Leg edema, left    Loss of consciousness (CMS/[facility])    Low blood sugar reading    Lung nodule, multiple    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[facility])    Metabolic  encephalopathy    Muscle cramps    Nodule of skin of left lower leg    Obstructive lung disease (CMS/HCC)    Osteoarthritis of left hand    Osteoarthritis of right hand    Osteopenia    Osteoporosis    Palpitations    Peripheral visual field defect    Persistent proteinuria    Positive colorectal cancer screening using Cologuard test    Benign essential HTN    Psychosis (CMS/HCC)    Pulmonary hypertension (CMS/HCC)    Refractive error    Hypertensive retinopathy    Retinal neovascularization    Secondary pulmonary arterial hypertension (CMS/HCC)    Shortness of breath on exertion    Spinal stenosis of lumbar region with neurogenic claudication    Subjective tinnitus of both ears    Swelling of right foot    Syncope and collapse    Thrombophlebitis of superficial veins of left lower extremity    Tinnitus of left ear    Vitamin D deficiency    DM type 2 with diabetic peripheral neuropathy (CMS/HCC)    Systemic lupus erythematosus (CMS/HCC)    Diabetic nephropathy (CMS/HCC)    Functional diarrhea    UTI (urinary tract infection)    Moderate protein-calorie malnutrition (CMS/HCC)    Shock, unspecified (CMS/HCC)    Hyperkalemia    Adrenal insufficiency (CMS/HCC)    Seizure-like activity (CMS/HCC)    Meningitis    Encephalopathy    Seizure (CMS/HCC)    Uncontrolled blood glucose    Cervical spondylosis with myelopathy    COPD (chronic obstructive pulmonary disease) (CMS/HCC)    Encounter for diabetes education    Rheumatoid arthritis involving both hands with positive rheumatoid factor (CMS/HCC)    Chronic kidney disease, stage 4 (severe) (CMS/HCC)    Ulcerative (chronic) pancolitis with rectal bleeding (CMS/HCC)    Anxiety    Cortical cataract    Hyperparathyroidism due to renal insufficiency (CMS/HCC)    Iron deficiency anemia due to chronic blood loss    Left ventricular hypertrophy    Nephrosclerosis    Obesity    Ocular migraine    Persistent cough    Proliferative diabetic retinopathy (CMS/HCC)    Raynaud's disease     Generalized weakness       General Visit Information:  Per EMR: pt presents from home with generalized weakness. Patient's son states that he noticed her becoming progressively weak on Friday 3/15/2024. On exam, patient is significantly altered and is unable to stay awake or participate in obtaining historical information. Attempted to call son at time of admission, but did not answer. Will attempt again later.     Recently admitted to the hospital on 3/9/2024 for hypoglycemia, hypothermia that was a suspected adrenal insufficiency where she was treated with hydrocortisone stress steroids.  Endocrinology was consulted at that time who recommended discharging patient on 20 mg prednisone to be taken once daily.     In the ED: Vitals were 98.2 T, 64 HR, 20 RR, 122/75 mmhg, 99% on RA. Labs showed a glucose of 185, creatinine 1.96, hemoglobin 8.7, and troponin 22 and 23 trended.  CXR shows mild pulmonary vascular congestion. No treatments provided.    General: On arrival, pt supine in bed.  Pt in no apparent distress and agreeable to therapy.  Reason for Referral: impaired mobility, gait training, impaired cognition/safety awareness  Referred By: Lillian Vazquez  Past Medical History Relevant to Rehab: SLE, Jaccoud's arthopathy, adrenal insuff, CKD, GERD, CAD s/p CABG, h/o AAA  Missed Visit: Yes  Co-Treatment: OT  Prior to Session Communication: Bedside nurse  Patient Position Received: Bed, 3 rail up, Alarm on    Home Living/PLOF:  Pt lives with son in house with 3 FATMATA.  15 steps up to WIS with chair.  Family assist pt upstairs to shower, otherwise pt stays on main level.  Pt states she uses FWW and WC, but mainly just transfers to/from WC and can do so independently.    Precautions:  Precautions  Medical Precautions: Fall precautions, Seizure precautions    Vital Signs:  Vital Signs  SpO2: (!) 87 % (on RA; improved to 93% on RA with VCs for deep breathing)  Objective     Pain:  Pain Assessment  Pain Assessment:  0-10  Pain Score: 0 - No pain    Cognition:  Cognition  Overall Cognitive Status: Within Functional Limits  Orientation Level: Oriented X4    General Assessments:      Activity Tolerance  Endurance: Decreased tolerance for upright activites  Sensation  Sensation Comment: denies any numbness/tingling    Static Sitting Balance  Static Sitting-Comment/Number of Minutes: fair plus  Dynamic Sitting Balance  Dynamic Sitting-Comments: fair  Static Standing Balance  Static Standing-Comment/Number of Minutes: poor  Dynamic Standing Balance  Dynamic Standing-Comments: poor    Functional Assessments:  Bed Mobility   Supine to sit: SBA  Sit to supine: SBA    Transfers  Sit to stand: Mod A x2 progressing to Max A x2; pt unable to maintain stand balance despite Max A x2; pt with heavy R sided lean in standing and inability to self correct despite Vcs and Tcs, decreased WB on LLE   Stand to sit: Mod A x2; Vcs for hand placement, decreased ecc control    Ambulation/Gait Training  Attempted side steps along EOB, pt unable to shift weight onto LLE to advance RLE and unable to correct R sided lean     Extremity/Trunk Assessments:  RLE strength: at least 3+/5 grossly; not formally tested secondary to c/o shin/ankle pain  LLE strength: at least 3+/5 besides DF 2/5, pt reports chornic ankle fracture that limites her foot/ankle mobility    Outcome Measures:  [facility] Basic Mobility  Turning from your back to your side while in a flat bed without using bedrails: A little  Moving from lying on your back to sitting on the side of a flat bed without using bedrails: A little  Moving to and from bed to chair (including a wheelchair): A lot  Standing up from a chair using your arms (e.g. wheelchair or bedside chair): A lot  To walk in hospital room: A lot  Climbing 3-5 steps with railing: Total  Basic Mobility - Total Score: 13    Goals:  Encounter Problems       Encounter Problems (Active)       PT Problem       Pt will be able to perform all bed  mobility tasks with Mod I.  (Progressing)       Start:  03/22/24    Expected End:  04/05/24            Pt will perform all transfers with Min A and FWW with proper safety mechanics.   (Progressing)       Start:  03/22/24    Expected End:  04/05/24            Pt will ambulate 10 ft with Min A using FWW for improved functional independence.  (Progressing)       Start:  03/22/24    Expected End:  04/05/24            Pt will be able to negotiate 3 steps with 1 HR with Min-Mod A.  (Progressing)       Start:  03/22/24    Expected End:  04/05/24                 Education Documentation  Home Exercise Program, taught by Miranda Barreto, PT at 3/22/2024  1:53 PM.  Learner: Patient  Readiness: Acceptance  Method: Explanation  Response: Verbalizes Understanding    Body Mechanics, taught by Miranda Barreto, PT at 3/22/2024  1:53 PM.  Learner: Patient  Readiness: Acceptance  Method: Explanation  Response: Verbalizes Understanding    Mobility Training, taught by Miranda Barreto, PT at 3/22/2024  1:53 PM.  Learner: Patient  Readiness: Acceptance  Method: Explanation  Response: Verbalizes Understanding    Education Comments  No comments found.

## 2024-03-22 NOTE — NURSING NOTE
Pt remained safe throughout shift. Pt denies pain. Pt A+Ox 1 and drowsy, responds to voice, follows commands and answers question. HR decreases to 40 at lowest. Vitals otherwise stable and pt denies symptoms. Dr. Angel notified.     0630: Pt A+Ox3. Pt able to swallow honey thick, nectar thick and thin liquids. Dr. Muñoz notified

## 2024-03-23 LAB
ALBUMIN SERPL BCP-MCNC: 2.7 G/DL (ref 3.4–5)
ANION GAP SERPL CALC-SCNC: 10 MMOL/L (ref 10–20)
BASOPHILS # BLD MANUAL: 0 X10*3/UL (ref 0–0.1)
BASOPHILS NFR BLD MANUAL: 0 %
BUN SERPL-MCNC: 29 MG/DL (ref 6–23)
CALCIUM SERPL-MCNC: 8.5 MG/DL (ref 8.6–10.3)
CHLORIDE SERPL-SCNC: 111 MMOL/L (ref 98–107)
CO2 SERPL-SCNC: 29 MMOL/L (ref 21–32)
CREAT SERPL-MCNC: 1.67 MG/DL (ref 0.5–1.05)
EGFRCR SERPLBLD CKD-EPI 2021: 34 ML/MIN/1.73M*2
EOSINOPHIL # BLD MANUAL: 0 X10*3/UL (ref 0–0.7)
EOSINOPHIL NFR BLD MANUAL: 0 %
ERYTHROCYTE [DISTWIDTH] IN BLOOD BY AUTOMATED COUNT: 19.4 % (ref 11.5–14.5)
GLUCOSE BLD MANUAL STRIP-MCNC: 100 MG/DL (ref 74–99)
GLUCOSE BLD MANUAL STRIP-MCNC: 114 MG/DL (ref 74–99)
GLUCOSE BLD MANUAL STRIP-MCNC: 164 MG/DL (ref 74–99)
GLUCOSE BLD MANUAL STRIP-MCNC: 169 MG/DL (ref 74–99)
GLUCOSE BLD MANUAL STRIP-MCNC: 185 MG/DL (ref 74–99)
GLUCOSE BLD MANUAL STRIP-MCNC: 208 MG/DL (ref 74–99)
GLUCOSE SERPL-MCNC: 90 MG/DL (ref 74–99)
HCT VFR BLD AUTO: 29.9 % (ref 36–46)
HGB BLD-MCNC: 8.5 G/DL (ref 12–16)
IMM GRANULOCYTES # BLD AUTO: 0.29 X10*3/UL (ref 0–0.7)
IMM GRANULOCYTES NFR BLD AUTO: 6.3 % (ref 0–0.9)
LYMPHOCYTES # BLD MANUAL: 0.97 X10*3/UL (ref 1.2–4.8)
LYMPHOCYTES NFR BLD MANUAL: 21 %
MAGNESIUM SERPL-MCNC: 2.34 MG/DL (ref 1.6–2.4)
MCH RBC QN AUTO: 29.5 PG (ref 26–34)
MCHC RBC AUTO-ENTMCNC: 28.4 G/DL (ref 32–36)
MCV RBC AUTO: 104 FL (ref 80–100)
METAMYELOCYTES # BLD MANUAL: 0.09 X10*3/UL
METAMYELOCYTES NFR BLD MANUAL: 2 %
MONOCYTES # BLD MANUAL: 0.28 X10*3/UL (ref 0.1–1)
MONOCYTES NFR BLD MANUAL: 6 %
MYELOCYTES # BLD MANUAL: 0.09 X10*3/UL
MYELOCYTES NFR BLD MANUAL: 2 %
NEUTROPHILS # BLD MANUAL: 3.18 X10*3/UL (ref 1.2–7.7)
NEUTS BAND # BLD MANUAL: 0.14 X10*3/UL (ref 0–0.7)
NEUTS BAND NFR BLD MANUAL: 3 %
NEUTS SEG # BLD MANUAL: 3.04 X10*3/UL (ref 1.2–7)
NEUTS SEG NFR BLD MANUAL: 66 %
NRBC BLD-RTO: 3.2 /100 WBCS (ref 0–0)
PHOSPHATE SERPL-MCNC: 2.8 MG/DL (ref 2.5–4.9)
PLATELET # BLD AUTO: 98 X10*3/UL (ref 150–450)
POTASSIUM SERPL-SCNC: 3.1 MMOL/L (ref 3.5–5.3)
RBC # BLD AUTO: 2.88 X10*6/UL (ref 4–5.2)
RBC MORPH BLD: ABNORMAL
SODIUM SERPL-SCNC: 147 MMOL/L (ref 136–145)
TOTAL CELLS COUNTED BLD: 100
WBC # BLD AUTO: 4.6 X10*3/UL (ref 4.4–11.3)

## 2024-03-23 PROCEDURE — 2500000002 HC RX 250 W HCPCS SELF ADMINISTERED DRUGS (ALT 637 FOR MEDICARE OP, ALT 636 FOR OP/ED)

## 2024-03-23 PROCEDURE — 36415 COLL VENOUS BLD VENIPUNCTURE: CPT

## 2024-03-23 PROCEDURE — 2500000001 HC RX 250 WO HCPCS SELF ADMINISTERED DRUGS (ALT 637 FOR MEDICARE OP)

## 2024-03-23 PROCEDURE — 99232 SBSQ HOSP IP/OBS MODERATE 35: CPT

## 2024-03-23 PROCEDURE — 1200000002 HC GENERAL ROOM WITH TELEMETRY DAILY

## 2024-03-23 PROCEDURE — 82947 ASSAY GLUCOSE BLOOD QUANT: CPT

## 2024-03-23 PROCEDURE — 85007 BL SMEAR W/DIFF WBC COUNT: CPT

## 2024-03-23 PROCEDURE — 83735 ASSAY OF MAGNESIUM: CPT

## 2024-03-23 PROCEDURE — 85027 COMPLETE CBC AUTOMATED: CPT

## 2024-03-23 PROCEDURE — C9113 INJ PANTOPRAZOLE SODIUM, VIA: HCPCS

## 2024-03-23 PROCEDURE — 84100 ASSAY OF PHOSPHORUS: CPT

## 2024-03-23 PROCEDURE — 2500000004 HC RX 250 GENERAL PHARMACY W/ HCPCS (ALT 636 FOR OP/ED)

## 2024-03-23 RX ORDER — AMLODIPINE BESYLATE 2.5 MG/1
2.5 TABLET ORAL ONCE
Status: COMPLETED | OUTPATIENT
Start: 2024-03-23 | End: 2024-03-23

## 2024-03-23 RX ORDER — POTASSIUM CHLORIDE 14.9 MG/ML
20 INJECTION INTRAVENOUS
Status: COMPLETED | OUTPATIENT
Start: 2024-03-23 | End: 2024-03-23

## 2024-03-23 RX ORDER — RISPERIDONE 0.5 MG/1
0.5 TABLET ORAL EVERY 6 HOURS PRN
Status: DISCONTINUED | OUTPATIENT
Start: 2024-03-23 | End: 2024-03-26 | Stop reason: HOSPADM

## 2024-03-23 RX ORDER — NYSTATIN 100000 [USP'U]/G
1 POWDER TOPICAL 2 TIMES DAILY
Status: DISCONTINUED | OUTPATIENT
Start: 2024-03-23 | End: 2024-03-26 | Stop reason: HOSPADM

## 2024-03-23 RX ORDER — AMLODIPINE BESYLATE 5 MG/1
5 TABLET ORAL DAILY
Status: DISCONTINUED | OUTPATIENT
Start: 2024-03-24 | End: 2024-03-23

## 2024-03-23 RX ORDER — AMLODIPINE BESYLATE 5 MG/1
5 TABLET ORAL DAILY
Status: DISCONTINUED | OUTPATIENT
Start: 2024-03-23 | End: 2024-03-24

## 2024-03-23 RX ADMIN — RISPERIDONE 0.5 MG: 0.5 TABLET ORAL at 16:23

## 2024-03-23 RX ADMIN — INSULIN LISPRO 2 UNITS: 100 INJECTION, SOLUTION INTRAVENOUS; SUBCUTANEOUS at 12:13

## 2024-03-23 RX ADMIN — LEVETIRACETAM 500 MG: 500 TABLET, FILM COATED ORAL at 12:13

## 2024-03-23 RX ADMIN — MYCOPHENOLATE MOFETIL 1000 MG: 250 CAPSULE ORAL at 22:37

## 2024-03-23 RX ADMIN — APIXABAN 2.5 MG: 2.5 TABLET, FILM COATED ORAL at 22:36

## 2024-03-23 RX ADMIN — THIAMINE HCL TAB 100 MG 100 MG: 100 TAB at 08:16

## 2024-03-23 RX ADMIN — AMLODIPINE BESYLATE 2.5 MG: 2.5 TABLET ORAL at 20:56

## 2024-03-23 RX ADMIN — POTASSIUM CHLORIDE 20 MEQ: 14.9 INJECTION, SOLUTION INTRAVENOUS at 15:26

## 2024-03-23 RX ADMIN — Medication 5 MG: at 01:05

## 2024-03-23 RX ADMIN — INSULIN LISPRO 2 UNITS: 100 INJECTION, SOLUTION INTRAVENOUS; SUBCUTANEOUS at 01:05

## 2024-03-23 RX ADMIN — ATORVASTATIN CALCIUM 40 MG: 40 TABLET, FILM COATED ORAL at 22:36

## 2024-03-23 RX ADMIN — PREDNISONE 20 MG: 20 TABLET ORAL at 08:16

## 2024-03-23 RX ADMIN — INSULIN LISPRO 2 UNITS: 100 INJECTION, SOLUTION INTRAVENOUS; SUBCUTANEOUS at 21:07

## 2024-03-23 RX ADMIN — AMLODIPINE BESYLATE 2.5 MG: 2.5 TABLET ORAL at 08:17

## 2024-03-23 RX ADMIN — APIXABAN 2.5 MG: 2.5 TABLET, FILM COATED ORAL at 08:16

## 2024-03-23 RX ADMIN — PANTOPRAZOLE SODIUM 40 MG: 40 INJECTION, POWDER, FOR SOLUTION INTRAVENOUS at 08:16

## 2024-03-23 RX ADMIN — MYCOPHENOLATE MOFETIL 1000 MG: 250 CAPSULE ORAL at 08:17

## 2024-03-23 RX ADMIN — RISPERIDONE 0.5 MG: 0.5 TABLET ORAL at 10:16

## 2024-03-23 RX ADMIN — LEVETIRACETAM 500 MG: 500 TABLET, FILM COATED ORAL at 01:05

## 2024-03-23 RX ADMIN — INSULIN LISPRO 4 UNITS: 100 INJECTION, SOLUTION INTRAVENOUS; SUBCUTANEOUS at 16:23

## 2024-03-23 RX ADMIN — FOLIC ACID 1 MG: 1 TABLET ORAL at 08:16

## 2024-03-23 RX ADMIN — NYSTATIN 1 APPLICATION: 100000 POWDER TOPICAL at 22:37

## 2024-03-23 RX ADMIN — NYSTATIN 1 APPLICATION: 100000 POWDER TOPICAL at 08:16

## 2024-03-23 RX ADMIN — POTASSIUM CHLORIDE 20 MEQ: 14.9 INJECTION, SOLUTION INTRAVENOUS at 17:58

## 2024-03-23 ASSESSMENT — COGNITIVE AND FUNCTIONAL STATUS - GENERAL
TOILETING: A LITTLE
HELP NEEDED FOR BATHING: A LITTLE
DAILY ACTIVITIY SCORE: 18
MOVING FROM LYING ON BACK TO SITTING ON SIDE OF FLAT BED WITH BEDRAILS: A LITTLE
STANDING UP FROM CHAIR USING ARMS: A LITTLE
WALKING IN HOSPITAL ROOM: A LOT
CLIMB 3 TO 5 STEPS WITH RAILING: A LOT
DRESSING REGULAR LOWER BODY CLOTHING: A LITTLE
PERSONAL GROOMING: A LITTLE
DRESSING REGULAR UPPER BODY CLOTHING: A LITTLE
STANDING UP FROM CHAIR USING ARMS: A LITTLE
MOVING FROM LYING ON BACK TO SITTING ON SIDE OF FLAT BED WITH BEDRAILS: A LITTLE
HELP NEEDED FOR BATHING: A LITTLE
PERSONAL GROOMING: A LITTLE
MOVING TO AND FROM BED TO CHAIR: A LITTLE
EATING MEALS: A LITTLE
TURNING FROM BACK TO SIDE WHILE IN FLAT BAD: A LITTLE
WALKING IN HOSPITAL ROOM: A LOT
DRESSING REGULAR UPPER BODY CLOTHING: A LITTLE
DAILY ACTIVITIY SCORE: 18
MOVING TO AND FROM BED TO CHAIR: A LITTLE
TURNING FROM BACK TO SIDE WHILE IN FLAT BAD: A LITTLE
CLIMB 3 TO 5 STEPS WITH RAILING: A LOT
MOBILITY SCORE: 16
DRESSING REGULAR LOWER BODY CLOTHING: A LITTLE
EATING MEALS: A LITTLE
MOBILITY SCORE: 16
TOILETING: A LITTLE

## 2024-03-23 ASSESSMENT — PAIN SCALES - GENERAL
PAINLEVEL_OUTOF10: 0 - NO PAIN
PAINLEVEL_OUTOF10: 0 - NO PAIN

## 2024-03-23 NOTE — PROGRESS NOTES
Speech-Language Pathology    SLP Adult Inpatient Speech-Language Pathology Clinical Swallow Evaluation    Patient Name: Bing Holliday  MRN: [id number]  Today's Date: 3/23/2024   Time Calculation  Start Time: 1504  Stop Time: 1516  Time Calculation (min): 12 min     Recommendations:  Diet: Regular with thin liquids  Medication Administration: Whole with thin liquid   Strategies: Sit upright in 90 degree position&maintain upright posture during/after eating for 30 mins&slow rate&strict oral care&assistance/supervision with PO as needed & small sips/bites.   ST is not indicated at this time. D/C ST this date. Please reconsult ST if pt presents with S/S dysphagia.    Assessment:  Consistencies Trialed: Pureed, Regular; Thin Liquids   Oral motor exam: WFL with the exception of slightly decreased labial/lingual strength and with slightly slowed oral motor movements. White coating on tongue noted. Aggressive oral care provided.  Oral phase of swallowing was WFL, with functional oral preparation and clearance of all textures.   Pt presented with no apparent S/S pharyngeal dysphagia throughout this evaluation. Of note, SLP unable to conduct the [place] Swallow Protocol (i.e., 3 oz water challenge) as pt chose to consume single sips only via straw. Pt noted to be biting on straw while (functionally) transitioning thin liquid to oral cavity. Pt reports her this is a typical posture for her.    Pt was oriented with exception of specific date.   Pt is not considered to be at risk for aspiration at this time.   Per the results of this assessment, patient is appropriate to continue PO with recommendations and precautions as noted above.     Subjective   Pt seen at bedside for clinical swallow evaluation. Upon approach, pt with proper upright position bedside. Pt was pleasant and cooperative. Vocal quality was slightly hoarse. Pt reports a H/O vocal hoarseness, and her voice is lighter than before (i.e., higher pitched). Speech  "intelligibility was WFL.      Baseline Assessment:  O2 use: Room air     General Visit Information:  Clinical Swallow Evaluation ordered d/t concern for dysphagia. Current diet level is Regular with thin liquids.  RN Hetal that upon whole medication administration with water, pt lifted her chin, then tucked her chin to consume same.        History Of Present Illness:       Per EMR, \"Bing Holliday is a 63 y.o. 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 Patient's son states that he noticed her becoming progressively weak on Friday 3/15/2024. On exam, patient is significantly altered and is unable to stay awake or participate in obtaining historical information. Attempted to call son at time of admission, but did not answer. Will attempt again later.     Recently admitted to the hospital on 3/9/2024 for hypoglycemia, hypothermia that was a suspected adrenal insufficiency where she was treated with hydrocortisone stress steroids.  Endocrinology was consulted at that time who recommended discharging patient on 20 mg prednisone to be taken once daily.     In the ED: Vitals were 98.2 T, 64 HR, 20 RR, 122/75 mmhg, 99% on RA. Labs showed a glucose of 185, creatinine 1.96, hemoglobin 8.7, and troponin 22 and 23 trended.  CXR shows mild pulmonary vascular congestion. No treatments provided.    Imaging   XR chest 1 view     Result Date: 3/19/2024  Interpreted By:  Jeri Smith, STUDY: XR CHEST 1 VIEW;  3/19/2024 10:49 pm   INDICATION: Signs/Symptoms:gen weakness, inc leg edema.   COMPARISON: Radiographs of the chest dated 01/17/2024   ACCESSION NUMBER(S): [id number]   ORDERING CLINICIAN: IRINEO VARGAS   FINDINGS: AP radiograph of the chest was provided.   Previously visualized enteric tube has been removed.   " CARDIOMEDIASTINAL SILHOUETTE: Cardiomediastinal silhouette is mildly enlarged, stable in appearance to prior exam.   LUNGS: Somewhat low lung volumes are present with mild pulmonary vascular congestion. No consolidation or sizable pleural effusion is evident.   ABDOMEN: No remarkable upper abdominal findings.   BONES: No acute osseous changes.        1.  Somewhat low lung volumes with mild pulmonary vascular congestion. No consolidation or sizable pleural effusion is evident.       MACRO: None   Signed by: Jeri Smith 3/19/2024 10:53 PM Dictation workstation:   [id number]    Past Medical History  As above     Surgical History  She has a past surgical history that includes Eye surgery (03/06/2015); Total hip arthroplasty (01/29/2015); Cholecystectomy (01/29/2015); Other surgical history (01/29/2015); Other surgical history (01/29/2015); Ankle surgery (01/29/2015); Foot surgery (01/29/2015); MR angio head wo IV contrast (7/26/2013); MR angio neck wo IV contrast (7/26/2013); CT guided percutaneous biopsy bone deep (5/4/2021); MR angio head wo IV contrast (9/17/2021); MR angio neck wo IV contrast (9/17/2021); MR angio neck wo IV contrast (3/25/2023); and MR angio head wo IV contrast (3/25/2023).     Pain:  Rating scale: 0-10   Pt report: 0      Treatment: No     Education:   Learner pt; RN Hetal  Barriers to Learning None  Method demonstration; verbal  Education-Topic  SLP provided patient/family education regarding role of SLP, purpose of assessment and clinical impressions/recommendations. Patient verbalized full comprehension, consistent with cognitive status. SLP further coordinated with RN regarding recommendations and precautions per this assessment, with RN verbalizing understanding.  Outcome Needs review/reinforcement

## 2024-03-23 NOTE — CARE PLAN
The patient's goals for the shift include  pt will have improvement in mental status this shift    The clinical goals for the shift include pt will remian hemodynamically stable this shift    Goals progressing, safety maintained. Pt orientedx2, with frequent hallucinations during night.. cooperative with care.

## 2024-03-23 NOTE — PROGRESS NOTES
"    Endocrinology Inpatient Consult Progress Note     PATIENT NAME: Bing Holliday  MRN: [id number]  DATE: 3/23/2024    CONSULTING PHYSICIAN: Dr. Haywood  REASON FOR CONSULT: AI. T2DM. Hypoglycemia.      Interval Events     PO intake is still not optimal  Watching TV - Golf.  No new complaints  D/w Lucy RN      Physical Examination     /86 (BP Location: Right arm, Patient Position: Lying)   Pulse (!) 42   Temp 36 °C (96.8 °F) (Temporal)   Resp 15   Ht 1.676 m (5' 5.98\")   Wt 96.5 kg (212 lb 11.9 oz)   LMP  (LMP Unknown)   SpO2 92%   BMI 34.35 kg/m²   No acute distress.  Much more interactive than previous. Male pattern balding  Appropriate affect.  Alert  Pleasant-  Regular rate and rhythm.  Normal B sounds  Soft nontender nondistended abdomen.  Severe deformities in both hands and feet  LE's trace edema      Medications     Reviewed MAR       Data     Recent Labs and Imaging Reviewed      Assessment / Plan        # Adrenal Insufficiency  The pt has been  started on prednisone 20 mg daily.  The patient's constitutional signs as well as mental status have improved quite greatly.  She is able to tolerate oral medications.  She has been transition to prednisone 20 mg daily.      # Hypoglycemia  Now she has had no hypoglycemia over the last 24 hrs     # Uncontrolled Type 2 Diabetes Mellitus  Home Regimen: None.  Hemoglobin A1c (7/23): 6.9%  Nutrtion: Regular Diet.     Continue sliding scale insulin with meals and at bedtime is reasonable for now.     # Osteoporosis  In the setting of chronic glucocorticoid use.  This will be further managed as an outpatient.  She probably would benefit from a bisphosphonate, these agents are best studied with steroid-induced adynamic bone disease.    # Vit D status  Latest Vit D is done on 12/23 and was 34. Need to reassess.    # Thyroid status  FT4 1.01  and TSH 0.99 on 3/20/24         ERIC Sloan MD  Endocrinology, Diabetes, and Metabolism    Available via EPIC " Messenger    Please excuse any typographical or unwanted errors within this documentation as voice recognition software was used to dictate this note.

## 2024-03-23 NOTE — HOSPITAL COURSE
Bing Holliday is a 63 y.o. 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  Upon arrival to the emergency department patient had SIRS criteria with hypothermia and tachypnea and was corrected overnight with the use of a Allan hugger.  Patient was not responsive to questions and not cooperative and hyper analgesic exam.  Due to her adrenal insufficiency and being on chronic prednisone 20 mg, stress dose steroids were initiated with Solu-Cortef, and endocrinology was consulted.  The next day, patient's mentation remarkably improved and hypothermia resolved.  Extensive infectious workup was performed, including blood cultures and CT chest abdomen/pelvis, which was negative for any infectious etiology for hypothermia.  MRI brain with and without contrast was also obtained to rule out any possible encephalitis or lupus cerebritis, which was also negative.  As patient's mentation returned back to baseline, she evaluated by PT/OT and recommended SNF placement.  Was noted that throughout hospitalization she was slightly hypertensive, and her antihypertensives were switched from amlodipine to nifedipine 60 mg daily.  Endocrinology recommends continuing prednisone 20 mg daily, and to discontinue her basal Lantus due to intermittent episodes of hypoglycemia.  Medically clear for discharge at this time to SNF.

## 2024-03-23 NOTE — PROGRESS NOTES
Bing Holliday is a 63 y.o. female on day 2 of admission presenting with Generalized weakness.      Subjective   Patient was seen and examined bedside this morning.  She reported hallucinations of people standing in the room and surrounding her but described them as being nonthreatening but was uncomfortable with their presence.  She asked for her medication that makes her hallucinations go away.  She denies any pain.        Objective     Last Recorded Vitals  /68 (BP Location: Right arm)   Pulse 55   Temp 36 °C (96.8 °F) (Temporal)   Resp 18   Wt 96.5 kg (212 lb 11.9 oz)   SpO2 96%   Intake/Output last 3 Shifts:    Intake/Output Summary (Last 24 hours) at 3/23/2024 1541  Last data filed at 3/23/2024 0818  Gross per 24 hour   Intake --   Output 1200 ml   Net -1200 ml       Admission Weight  Weight: 96.5 kg (212 lb 11.9 oz) (03/19/24 2221)    Daily Weight  03/19/24 : 96.5 kg (212 lb 11.9 oz)    Image Results  MR brain w and wo IV contrast  Narrative: Interpreted By:  Jeri Smith,   STUDY:  MR BRAIN W AND WO IV CONTRAST;  3/20/2024 10:34 pm      INDICATION:  Signs/Symptoms:encephalopathy.      COMPARISON:  MRI of the brain dated 01/05/2024; same day noncontrast CT head      ACCESSION NUMBER(S):  [id number]      ORDERING CLINICIAN:  BING MAHER      TECHNIQUE:  Multiplanar and multisequence MRI of the brain was performed before  and after intravenous administration of 20 mL of Dotarem gadolinium  contrast.      FINDINGS:  Exam is degraded by motion.      No diffusion restriction abnormality is present to suggest an acute  infarct.      There is no imaging evidence to suggest acute intracranial  hemorrhage. No mass effect or midline shift is evident.      Hemosiderin staining is present within the area of cystic  encephalomalacia and gliosis in the left occipital lobe, likely  representing sequela of prior infarct/injury.      No ventricular dilatation is evident. Basal cisterns are patent.  No  extra-axial fluid collections are identified.      Patchy and confluence areas of hyperintense FLAIR and T2 signal are  present in the periventricular and subcortical white matter of  bilateral cerebral hemispheres, nonspecific findings favored to  represent sequela of microvascular disease.      No pathologic intracranial enhancement is identified.      Visualized large arterial flow voids, including intracranial carotid  and basilar artery appear preserved.      Mild mucosal thickening is present in the inferior maxillary sinuses  bilaterally. No abnormal fluid signal is present in the mastoid air  cells bilaterally.      Impression: 1. Within limitations of somewhat motion degraded study, no evidence  of new intracranial infarct, hemorrhage or pathologic enhancement.  2. Geographic area of cystic encephalomalacia and gliosis present in  the left occipital lobe likely represents chronic sequela of prior  infarct/injury, similar in appearance to prior exam.  3. Patchy and confluence areas of hyperintense FLAIR and T2 signal  are present in the periventricular and subcortical white matter of  bilateral cerebral hemispheres, nonspecific findings favored to  represent sequela of microvascular disease.      MACRO:  None      Signed by: Jrei Smith 3/20/2024 11:57 PM  Dictation workstation:   [id number]  CT chest abdomen pelvis wo IV contrast  Narrative: Interpreted By:  Richie Garcia,   STUDY:  CT CHEST ABDOMEN PELVIS WO CONTRAST;  3/20/2024 6:16 pm      INDICATION:  Signs/Symptoms:sepsis      COMPARISON:  CT of the chest, abdomen, and pelvis 01/14/2024      ACCESSION NUMBER(S):  [id number]      ORDERING CLINICIAN:  LISBET MAHER      TECHNIQUE:  CT of the chest, abdomen, and pelvis was performed.  Volumetric CT imaging through the chest, abdomen, and pelvis without  contrast.. Multiplanar reconstructions were processed on a separate  workstation.      FINDINGS:  CHEST:      Motion limited  examination. Asymmetric mixed reticular and airspace  opacities of the right greater left central upper lobes and central  right and left lower lobes suggestive of either pneumonitis or  atypical pulmonary edema. No pneumothorax or effusion.      The thoracic aorta is unchanged with respect course, caliber contour.      The main pulmonary artery and its branches are unchanged with respect  course, caliber, and contour.      Cardiomegaly with severe coronary atherosclerotic calcifications and  probable background of coronary stents. Relative low density of  intravascular volume with respect to the vessel wall suggestive of  anemia. Osseous structures appear stable with chronic appearing  compression irregularity of T7. Similar appearing background of  multilevel spondylosis. Minimal step-off of the mid sternum, presumed  secondary to motion artifact (series 500, image 72).      ABDOMEN:      Motion limited examination.      The nonenhanced liver, spleen, bilateral adrenal glands, pancreas,  and bilateral kidneys are grossly unchanged. No abnormal dilatation  of large or small bowel. Colonic diverticulosis. No obvious acute  diverticulitis is detected. The lower pelvis is suboptimally assessed  due to extensive beam hardening artifact from right hip arthroplasty.  The urinary bladder and lower pelvic organs are not adequately  assessed. There is significant wall thickening of the ascending colon  with minimal adjacent reticular changes. No evidence of abdominal  aortic aneurysm. Osseous structures appear stable. Chronic appearing  height loss of the L1 and L2 vertebral bodies. Severe multilevel  spondylosis.      Impression: CHEST  1.  Motion limited examination.  2. Relatively central mixed reticular and airspace opacities  suggestive either multifocal pneumonitis or atypical pulmonary edema.  3. Remaining intrathoracic findings appear stable since comparison CT  imaging 01/14/2024.      ABDOMEN - PELVIS  1.   Nonspecific wall thickening of the ascending colon suggestive of  a nonspecific colitis. Correlate for infectious, inflammatory, or  ischemic etiologies.  2. Colonic diverticulosis without evidence of acute diverticulitis.  3. Remaining findings appear stable since comparison CT imaging  01/14/2024.          MACRO:  None      Signed by: Richie Garcia 3/20/2024 8:02 PM  Dictation workstation:   [id number]  ECG 12 lead  Sinus bradycardia  Voltage criteria for left ventricular hypertrophy  Nonspecific T wave abnormality  Abnormal ECG  When compared with ECG of 09-MAR-2024 18:13,  Vent. rate has decreased BY  36 BPM  Nonspecific T wave abnormality no longer evident in Anterior leads  ECG 12 lead  Sinus bradycardia  Voltage criteria for left ventricular hypertrophy  Nonspecific T wave abnormality  Abnormal ECG  When compared with ECG of 19-MAR-2024 22:44, (unconfirmed)  No significant change was found  CT head wo IV contrast  Narrative: Interpreted By:  Anselmo Chan,   STUDY:  CT HEAD WO IV CONTRAST;  3/20/2024 3:31 am      INDICATION:  Signs/Symptoms:AMS.      COMPARISON:  CT scan of the head 03/09/2024      ACCESSION NUMBER(S):  [id number]      ORDERING CLINICIAN:  GOMEZ CHAMBERS      TECHNIQUE:  Axial noncontrast CT images of the head.      FINDINGS:  BRAIN PARENCHYMA: Stable focal area of encephalomalacia in the left  occipital lobe likely sequela of remote infarct. Gray-white matter  interfaces are otherwise preserved. No mass, mass effect or midline  shift. Mild deep and periventricular white matter hypodensities are  nonspecific, but favored to represent chronic small vessel ischemic  changes. Calcifications bilateral basal ganglia are likely  physiologic.      HEMORRHAGE: No acute intracranial hemorrhage.  VENTRICLES and EXTRA-AXIAL SPACES: Mild volume loss with prominence  of the ventricles and sulci. EXTRACRANIAL SOFT TISSUES: Within normal  limits. PARANASAL SINUSES/MASTOIDS: The visualized paranasal  sinuses  and mastoid air cells are aerated. CALVARIUM: No depressed skull  fracture. No destructive osseous lesion.      OTHER FINDINGS: Atherosclerotic calcification of the carotid siphons  and vertebral arteries..      Impression: No acute intracranial abnormality. If symptoms persist or there is  high clinical suspicion further evaluation with MRI can be considered.      Chronic changes as noted above.      MACRO:  None      Signed by: Anselmo Chan 3/20/2024 3:40 AM  Dictation workstation:   [id number]      Physical Exam  Constitutional:       General: She is not in acute distress.     Appearance: She is not toxic-appearing.   HENT:      Mouth/Throat:      Mouth: Mucous membranes are moist.      Pharynx: Oropharynx is clear.   Eyes:      Conjunctiva/sclera: Conjunctivae normal.      Comments: Glasses   Cardiovascular:      Rate and Rhythm: Normal rate and regular rhythm.   Pulmonary:      Effort: Pulmonary effort is normal.      Breath sounds: Normal breath sounds.   Abdominal:      General: Abdomen is flat. Bowel sounds are normal. There is no distension.      Tenderness: There is no abdominal tenderness.   Musculoskeletal:      Right lower leg: Edema present.      Left lower leg: Edema present.   Skin:     General: Skin is warm and dry.   Neurological:      General: No focal deficit present.      Mental Status: She is alert. Mental status is at baseline.   Psychiatric:         Mood and Affect: Mood normal.         Relevant Results  Scheduled medications  [START ON 3/24/2024] amLODIPine, 5 mg, oral, Daily  apixaban, 2.5 mg, oral, BID  atorvastatin, 40 mg, oral, Daily  folic acid, 1 mg, oral, Daily  insulin lispro, 0-10 Units, subcutaneous, q4h  levETIRAcetam, 500 mg, oral, q12h  mycophenolate, 1,000 mg, oral, BID  nystatin, 1 Application, Topical, BID  pantoprazole, 40 mg, intravenous, Daily  potassium chloride, 20 mEq, intravenous, q2h  predniSONE, 20 mg, oral, Daily  thiamine, 100 mg, oral,  Daily      Continuous medications     PRN medications  PRN medications: dextrose, dextrose, diclofenac sodium, glucagon, ipratropium-albuteroL, melatonin, risperiDONE    Results for orders placed or performed during the hospital encounter of 03/19/24 (from the past 24 hour(s))   POCT GLUCOSE   Result Value Ref Range    POCT Glucose 167 (H) 74 - 99 mg/dL   POCT GLUCOSE   Result Value Ref Range    POCT Glucose 119 (H) 74 - 99 mg/dL   POCT GLUCOSE   Result Value Ref Range    POCT Glucose 169 (H) 74 - 99 mg/dL   POCT GLUCOSE   Result Value Ref Range    POCT Glucose 114 (H) 74 - 99 mg/dL   POCT GLUCOSE   Result Value Ref Range    POCT Glucose 100 (H) 74 - 99 mg/dL   Renal function panel   Result Value Ref Range    Glucose 90 74 - 99 mg/dL    Sodium 147 (H) 136 - 145 mmol/L    Potassium 3.1 (L) 3.5 - 5.3 mmol/L    Chloride 111 (H) 98 - 107 mmol/L    Bicarbonate 29 21 - 32 mmol/L    Anion Gap 10 10 - 20 mmol/L    Urea Nitrogen 29 (H) 6 - 23 mg/dL    Creatinine 1.67 (H) 0.50 - 1.05 mg/dL    eGFR 34 (L) >60 mL/min/1.73m*2    Calcium 8.5 (L) 8.6 - 10.3 mg/dL    Phosphorus 2.8 2.5 - 4.9 mg/dL    Albumin 2.7 (L) 3.4 - 5.0 g/dL   Magnesium   Result Value Ref Range    Magnesium 2.34 1.60 - 2.40 mg/dL   CBC and Auto Differential   Result Value Ref Range    WBC 4.6 4.4 - 11.3 x10*3/uL    nRBC 3.2 (H) 0.0 - 0.0 /100 WBCs    RBC 2.88 (L) 4.00 - 5.20 x10*6/uL    Hemoglobin 8.5 (L) 12.0 - 16.0 g/dL    Hematocrit 29.9 (L) 36.0 - 46.0 %     (H) 80 - 100 fL    MCH 29.5 26.0 - 34.0 pg    MCHC 28.4 (L) 32.0 - 36.0 g/dL    RDW 19.4 (H) 11.5 - 14.5 %    Platelets 98 (L) 150 - 450 x10*3/uL    Immature Granulocytes %, Automated 6.3 (H) 0.0 - 0.9 %    Immature Granulocytes Absolute, Automated 0.29 0.00 - 0.70 x10*3/uL   Manual Differential   Result Value Ref Range    Neutrophils %, Manual 66.0 40.0 - 80.0 %    Bands %, Manual 3.0 0.0 - 5.0 %    Lymphocytes %, Manual 21.0 13.0 - 44.0 %    Monocytes %, Manual 6.0 2.0 - 10.0 %    Eosinophils  %, Manual 0.0 0.0 - 6.0 %    Basophils %, Manual 0.0 0.0 - 2.0 %    Metamyelocytes %, Manual 2.0 0.0 - 0.0 %    Myelocytes %, Manual 2.0 0.0 - 0.0 %    Seg Neutrophils Absolute, Manual 3.04 1.20 - 7.00 x10*3/uL    Bands Absolute, Manual 0.14 0.00 - 0.70 x10*3/uL    Lymphocytes Absolute, Manual 0.97 (L) 1.20 - 4.80 x10*3/uL    Monocytes Absolute, Manual 0.28 0.10 - 1.00 x10*3/uL    Eosinophils Absolute, Manual 0.00 0.00 - 0.70 x10*3/uL    Basophils Absolute, Manual 0.00 0.00 - 0.10 x10*3/uL    Metamyelocytes Absolute, Manual 0.09 0.00 - 0.00 x10*3/uL    Myelocytes Absolute, Manual 0.09 0.00 - 0.00 x10*3/uL    Total Cells Counted 100     Neutrophils Absolute, Manual 3.18 1.20 - 7.70 x10*3/uL    RBC Morphology No significant RBC morphology present    POCT GLUCOSE   Result Value Ref Range    POCT Glucose 185 (H) 74 - 99 mg/dL   POCT GLUCOSE   Result Value Ref Range    POCT Glucose 208 (H) 74 - 99 mg/dL     No results found.      Assessment/Plan      Principal Problem:    Generalized weakness    Bing Holliday is a 63 y.o. 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 Patient's son states that he noticed her becoming progressively weak on Friday 3/15/2024. No signs of adrenal insufficiency within out data as patient is hemodynamically stable and normo-glycemic; however patient is significantly altered with an unknown etiology at this point.  On second day of admission was found to be hypernatremic started D5W and patient is continuing to answer more yes or no questions.  On third day of admission and completion of 24 hours of D5 and stress dose steroids, patient was alert and oriented x 2, sitting up in bed and answering questions.  SLP cleared patient for regular diet.  Resumed on home p.o. medications  and is currently awaiting precertification for [facility].     #AMS with hallucinations  #Generalized weakness  #Secondary adrenal insufficiency  #Hypernatremia, resolved  CXR 3/19 = mild vascular congestion  CT head 3/19 = no acute pathology  MRI brain 3/20 = study affected by motion, cystic encephalomalacia (chronic), evidence of nonspecific microvascular disease  CT chest abdomen pelvis 3/20 = pneumonitis/atypical pulmonary edema; findings suggestive of colitis - no abdominal or respiratory findings that correlate on physical exam  Flu/COVID/RSV negative  CRP = 1.70, elevated  TSH = 90, normal  Ammonia = 22, normal  UA = +1 leukocyte esterase, +3 proteinuria; family reports no urinary complaints  3/23 - self-reported hallucinations and requested her home risperidone  -Sodium level peaked at 148, and resolved overnight with D5  -At admission given stress dose steroids due to concern of adrenal insufficiency, discontinued on 3/22; started on home prednisone 20mg daily  -Bcx no growth; urine culture no growth  -PT/OT = recommending moderate intensity and wheeled walker/wheelchair  -Endocrinology consulted = low suspicion for adrenal insufficiency recommends continuing home prednisone  -Palliative care consulted for goals of care  -Home risperidone as needed for hallucinations    #Hypertension  -Increased home amlodipine to 5 mg     #IDDMT2 w/ history of hypoglycemia  -Insulin sliding scale  -POCT     #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  Mild/moderate MR.  Moderately elevated RVSP of 49.5,  global hypokinesis of left ventricle with minor regional variations   #Lower extremity swelling, chronic  #HLD - lipitor  #History of seizures - Continue home Keppra  #GERD - continue home protonix     DVT: Eliquis  Diet: Diabetic  Consults: Palliative care  CODE: FULL    Disposition: Awaiting pre-CERT for [facility]     Assessment and plan discussed with attending physician.  Attending addendum to follow.  Bing Muñoz D.O.  PGY-1, Internal Medicine   - [facility] [place]            Bing Muñoz DO

## 2024-03-24 LAB
ALBUMIN SERPL BCP-MCNC: 2.6 G/DL (ref 3.4–5)
ALBUMIN SERPL BCP-MCNC: 2.8 G/DL (ref 3.4–5)
ANION GAP SERPL CALC-SCNC: 11 MMOL/L (ref 10–20)
ANION GAP SERPL CALC-SCNC: 13 MMOL/L (ref 10–20)
BACTERIA BLD CULT: NORMAL
BACTERIA BLD CULT: NORMAL
BASOPHILS # BLD MANUAL: 0 X10*3/UL (ref 0–0.1)
BASOPHILS NFR BLD MANUAL: 0 %
BUN SERPL-MCNC: 24 MG/DL (ref 6–23)
BUN SERPL-MCNC: 25 MG/DL (ref 6–23)
BURR CELLS BLD QL SMEAR: ABNORMAL
CALCIUM SERPL-MCNC: 7.9 MG/DL (ref 8.6–10.3)
CALCIUM SERPL-MCNC: 8.1 MG/DL (ref 8.6–10.3)
CHLORIDE SERPL-SCNC: 107 MMOL/L (ref 98–107)
CHLORIDE SERPL-SCNC: 112 MMOL/L (ref 98–107)
CO2 SERPL-SCNC: 25 MMOL/L (ref 21–32)
CO2 SERPL-SCNC: 27 MMOL/L (ref 21–32)
CREAT SERPL-MCNC: 1.48 MG/DL (ref 0.5–1.05)
CREAT SERPL-MCNC: 1.56 MG/DL (ref 0.5–1.05)
DACRYOCYTES BLD QL SMEAR: ABNORMAL
EGFRCR SERPLBLD CKD-EPI 2021: 37 ML/MIN/1.73M*2
EGFRCR SERPLBLD CKD-EPI 2021: 40 ML/MIN/1.73M*2
EOSINOPHIL # BLD MANUAL: 0 X10*3/UL (ref 0–0.7)
EOSINOPHIL NFR BLD MANUAL: 0 %
ERYTHROCYTE [DISTWIDTH] IN BLOOD BY AUTOMATED COUNT: 19.4 % (ref 11.5–14.5)
GLUCOSE BLD MANUAL STRIP-MCNC: 145 MG/DL (ref 74–99)
GLUCOSE BLD MANUAL STRIP-MCNC: 155 MG/DL (ref 74–99)
GLUCOSE BLD MANUAL STRIP-MCNC: 291 MG/DL (ref 74–99)
GLUCOSE BLD MANUAL STRIP-MCNC: 315 MG/DL (ref 74–99)
GLUCOSE BLD MANUAL STRIP-MCNC: 75 MG/DL (ref 74–99)
GLUCOSE BLD MANUAL STRIP-MCNC: 92 MG/DL (ref 74–99)
GLUCOSE SERPL-MCNC: 330 MG/DL (ref 74–99)
GLUCOSE SERPL-MCNC: 90 MG/DL (ref 74–99)
HCT VFR BLD AUTO: 31.9 % (ref 36–46)
HGB BLD-MCNC: 9.1 G/DL (ref 12–16)
IMM GRANULOCYTES # BLD AUTO: 0.28 X10*3/UL (ref 0–0.7)
IMM GRANULOCYTES NFR BLD AUTO: 5.7 % (ref 0–0.9)
LYMPHOCYTES # BLD MANUAL: 0.83 X10*3/UL (ref 1.2–4.8)
LYMPHOCYTES NFR BLD MANUAL: 17 %
MAGNESIUM SERPL-MCNC: 1.8 MG/DL (ref 1.6–2.4)
MCH RBC QN AUTO: 30.1 PG (ref 26–34)
MCHC RBC AUTO-ENTMCNC: 28.5 G/DL (ref 32–36)
MCV RBC AUTO: 106 FL (ref 80–100)
METAMYELOCYTES # BLD MANUAL: 0.05 X10*3/UL
METAMYELOCYTES NFR BLD MANUAL: 1 %
MONOCYTES # BLD MANUAL: 0.1 X10*3/UL (ref 0.1–1)
MONOCYTES NFR BLD MANUAL: 2 %
MYELOCYTES # BLD MANUAL: 0.05 X10*3/UL
MYELOCYTES NFR BLD MANUAL: 1 %
NEUTROPHILS # BLD MANUAL: 3.87 X10*3/UL (ref 1.2–7.7)
NEUTS BAND # BLD MANUAL: 0.05 X10*3/UL (ref 0–0.7)
NEUTS BAND NFR BLD MANUAL: 1 %
NEUTS SEG # BLD MANUAL: 3.82 X10*3/UL (ref 1.2–7)
NEUTS SEG NFR BLD MANUAL: 78 %
NRBC BLD-RTO: 3.7 /100 WBCS (ref 0–0)
OVALOCYTES BLD QL SMEAR: ABNORMAL
PHOSPHATE SERPL-MCNC: 2.3 MG/DL (ref 2.5–4.9)
PHOSPHATE SERPL-MCNC: 2.7 MG/DL (ref 2.5–4.9)
PLATELET # BLD AUTO: 96 X10*3/UL (ref 150–450)
POLYCHROMASIA BLD QL SMEAR: ABNORMAL
POTASSIUM SERPL-SCNC: 3.6 MMOL/L (ref 3.5–5.3)
POTASSIUM SERPL-SCNC: 5 MMOL/L (ref 3.5–5.3)
RBC # BLD AUTO: 3.02 X10*6/UL (ref 4–5.2)
RBC MORPH BLD: ABNORMAL
SODIUM SERPL-SCNC: 138 MMOL/L (ref 136–145)
SODIUM SERPL-SCNC: 148 MMOL/L (ref 136–145)
TOTAL CELLS COUNTED BLD: 100
WBC # BLD AUTO: 4.9 X10*3/UL (ref 4.4–11.3)

## 2024-03-24 PROCEDURE — 2500000001 HC RX 250 WO HCPCS SELF ADMINISTERED DRUGS (ALT 637 FOR MEDICARE OP)

## 2024-03-24 PROCEDURE — 2500000002 HC RX 250 W HCPCS SELF ADMINISTERED DRUGS (ALT 637 FOR MEDICARE OP, ALT 636 FOR OP/ED): Performed by: STUDENT IN AN ORGANIZED HEALTH CARE EDUCATION/TRAINING PROGRAM

## 2024-03-24 PROCEDURE — 85007 BL SMEAR W/DIFF WBC COUNT: CPT

## 2024-03-24 PROCEDURE — 2500000001 HC RX 250 WO HCPCS SELF ADMINISTERED DRUGS (ALT 637 FOR MEDICARE OP): Performed by: STUDENT IN AN ORGANIZED HEALTH CARE EDUCATION/TRAINING PROGRAM

## 2024-03-24 PROCEDURE — 2500000002 HC RX 250 W HCPCS SELF ADMINISTERED DRUGS (ALT 637 FOR MEDICARE OP, ALT 636 FOR OP/ED)

## 2024-03-24 PROCEDURE — 36415 COLL VENOUS BLD VENIPUNCTURE: CPT

## 2024-03-24 PROCEDURE — 1200000002 HC GENERAL ROOM WITH TELEMETRY DAILY

## 2024-03-24 PROCEDURE — C9113 INJ PANTOPRAZOLE SODIUM, VIA: HCPCS

## 2024-03-24 PROCEDURE — 82947 ASSAY GLUCOSE BLOOD QUANT: CPT

## 2024-03-24 PROCEDURE — 36415 COLL VENOUS BLD VENIPUNCTURE: CPT | Performed by: STUDENT IN AN ORGANIZED HEALTH CARE EDUCATION/TRAINING PROGRAM

## 2024-03-24 PROCEDURE — 80069 RENAL FUNCTION PANEL: CPT | Performed by: STUDENT IN AN ORGANIZED HEALTH CARE EDUCATION/TRAINING PROGRAM

## 2024-03-24 PROCEDURE — 99232 SBSQ HOSP IP/OBS MODERATE 35: CPT | Performed by: STUDENT IN AN ORGANIZED HEALTH CARE EDUCATION/TRAINING PROGRAM

## 2024-03-24 PROCEDURE — 2500000004 HC RX 250 GENERAL PHARMACY W/ HCPCS (ALT 636 FOR OP/ED): Performed by: STUDENT IN AN ORGANIZED HEALTH CARE EDUCATION/TRAINING PROGRAM

## 2024-03-24 PROCEDURE — 80069 RENAL FUNCTION PANEL: CPT

## 2024-03-24 PROCEDURE — 2500000004 HC RX 250 GENERAL PHARMACY W/ HCPCS (ALT 636 FOR OP/ED)

## 2024-03-24 PROCEDURE — 85027 COMPLETE CBC AUTOMATED: CPT

## 2024-03-24 PROCEDURE — 83735 ASSAY OF MAGNESIUM: CPT

## 2024-03-24 RX ORDER — DEXTROSE MONOHYDRATE 50 MG/ML
75 INJECTION, SOLUTION INTRAVENOUS CONTINUOUS
Status: DISCONTINUED | OUTPATIENT
Start: 2024-03-24 | End: 2024-03-25

## 2024-03-24 RX ORDER — NIFEDIPINE 30 MG/1
30 TABLET, FILM COATED, EXTENDED RELEASE ORAL
Status: DISCONTINUED | OUTPATIENT
Start: 2024-03-25 | End: 2024-03-24

## 2024-03-24 RX ORDER — NIFEDIPINE 30 MG/1
30 TABLET, FILM COATED, EXTENDED RELEASE ORAL
Status: DISCONTINUED | OUTPATIENT
Start: 2024-03-24 | End: 2024-03-25

## 2024-03-24 RX ORDER — POTASSIUM CHLORIDE 1.5 G/1.58G
40 POWDER, FOR SOLUTION ORAL ONCE
Status: COMPLETED | OUTPATIENT
Start: 2024-03-24 | End: 2024-03-24

## 2024-03-24 RX ADMIN — NYSTATIN 1 APPLICATION: 100000 POWDER TOPICAL at 08:49

## 2024-03-24 RX ADMIN — RISPERIDONE 0.5 MG: 0.5 TABLET ORAL at 13:18

## 2024-03-24 RX ADMIN — MYCOPHENOLATE MOFETIL 1000 MG: 250 CAPSULE ORAL at 21:10

## 2024-03-24 RX ADMIN — DEXTROSE MONOHYDRATE 75 ML/HR: 50 INJECTION, SOLUTION INTRAVENOUS at 21:57

## 2024-03-24 RX ADMIN — MYCOPHENOLATE MOFETIL 1000 MG: 250 CAPSULE ORAL at 08:48

## 2024-03-24 RX ADMIN — APIXABAN 2.5 MG: 2.5 TABLET, FILM COATED ORAL at 21:09

## 2024-03-24 RX ADMIN — PANTOPRAZOLE SODIUM 40 MG: 40 INJECTION, POWDER, FOR SOLUTION INTRAVENOUS at 08:48

## 2024-03-24 RX ADMIN — ATORVASTATIN CALCIUM 40 MG: 40 TABLET, FILM COATED ORAL at 21:10

## 2024-03-24 RX ADMIN — INSULIN LISPRO 6 UNITS: 100 INJECTION, SOLUTION INTRAVENOUS; SUBCUTANEOUS at 17:44

## 2024-03-24 RX ADMIN — LEVETIRACETAM 500 MG: 500 TABLET, FILM COATED ORAL at 13:18

## 2024-03-24 RX ADMIN — DEXTROSE MONOHYDRATE 75 ML/HR: 50 INJECTION, SOLUTION INTRAVENOUS at 08:49

## 2024-03-24 RX ADMIN — APIXABAN 2.5 MG: 2.5 TABLET, FILM COATED ORAL at 08:48

## 2024-03-24 RX ADMIN — INSULIN LISPRO 2 UNITS: 100 INJECTION, SOLUTION INTRAVENOUS; SUBCUTANEOUS at 13:18

## 2024-03-24 RX ADMIN — NYSTATIN 1 APPLICATION: 100000 POWDER TOPICAL at 21:11

## 2024-03-24 RX ADMIN — NIFEDIPINE 30 MG: 30 TABLET, FILM COATED, EXTENDED RELEASE ORAL at 10:53

## 2024-03-24 RX ADMIN — LEVETIRACETAM 500 MG: 500 TABLET, FILM COATED ORAL at 00:27

## 2024-03-24 RX ADMIN — PREDNISONE 20 MG: 20 TABLET ORAL at 08:48

## 2024-03-24 RX ADMIN — FOLIC ACID 1 MG: 1 TABLET ORAL at 08:48

## 2024-03-24 RX ADMIN — THIAMINE HCL TAB 100 MG 100 MG: 100 TAB at 08:48

## 2024-03-24 RX ADMIN — INSULIN LISPRO 8 UNITS: 100 INJECTION, SOLUTION INTRAVENOUS; SUBCUTANEOUS at 20:14

## 2024-03-24 RX ADMIN — POTASSIUM CHLORIDE 40 MEQ: 1.5 POWDER, FOR SOLUTION ORAL at 08:49

## 2024-03-24 ASSESSMENT — COGNITIVE AND FUNCTIONAL STATUS - GENERAL
MOBILITY SCORE: 17
MOVING FROM LYING ON BACK TO SITTING ON SIDE OF FLAT BED WITH BEDRAILS: A LITTLE
DRESSING REGULAR UPPER BODY CLOTHING: A LITTLE
STANDING UP FROM CHAIR USING ARMS: A LITTLE
CLIMB 3 TO 5 STEPS WITH RAILING: A LOT
DRESSING REGULAR UPPER BODY CLOTHING: A LITTLE
MOVING FROM LYING ON BACK TO SITTING ON SIDE OF FLAT BED WITH BEDRAILS: A LITTLE
TURNING FROM BACK TO SIDE WHILE IN FLAT BAD: A LITTLE
DAILY ACTIVITIY SCORE: 18
WALKING IN HOSPITAL ROOM: A LITTLE
DRESSING REGULAR LOWER BODY CLOTHING: A LITTLE
MOVING TO AND FROM BED TO CHAIR: A LITTLE
WALKING IN HOSPITAL ROOM: A LITTLE
EATING MEALS: A LITTLE
HELP NEEDED FOR BATHING: A LITTLE
PERSONAL GROOMING: A LITTLE
TOILETING: A LITTLE
HELP NEEDED FOR BATHING: A LITTLE
MOVING TO AND FROM BED TO CHAIR: A LITTLE
MOBILITY SCORE: 17
TOILETING: A LITTLE
TURNING FROM BACK TO SIDE WHILE IN FLAT BAD: A LITTLE
DAILY ACTIVITIY SCORE: 18
DRESSING REGULAR LOWER BODY CLOTHING: A LITTLE
PERSONAL GROOMING: A LITTLE
CLIMB 3 TO 5 STEPS WITH RAILING: A LOT
EATING MEALS: A LITTLE
STANDING UP FROM CHAIR USING ARMS: A LITTLE

## 2024-03-24 ASSESSMENT — PAIN SCALES - GENERAL
PAINLEVEL_OUTOF10: 0 - NO PAIN
PAINLEVEL_OUTOF10: 0 - NO PAIN

## 2024-03-24 ASSESSMENT — PAIN - FUNCTIONAL ASSESSMENT: PAIN_FUNCTIONAL_ASSESSMENT: 0-10

## 2024-03-24 NOTE — PROGRESS NOTES
"    Endocrinology Inpatient Consult Progress Note     PATIENT NAME: Bing Holliday  MRN: [id number]  DATE: 3/24/2024    CONSULTING PHYSICIAN: Dr. Haywood  REASON FOR CONSULT: AI. T2DM. Hypoglycemia.      Interval Events     No acute events overnight.  Patient was seen resting this afternoon.  Her dinner was waiting for her.  I woke her up.  She states that she feels weak.  She has not ambulated with physical therapy yet.  She has not left bed.  She is feeling better day by day.      Physical Examination     /72 (BP Location: Left arm, Patient Position: Lying)   Pulse 52   Temp 35.8 °C (96.4 °F) (Temporal)   Resp 16   Ht 1.676 m (5' 5.98\")   Wt 96.5 kg (212 lb 11.9 oz)   LMP  (LMP Unknown)   SpO2 93%   BMI 34.35 kg/m²   No acute distress.  Much more interactive than previous.  Appropriate affect.  Frontal alopecia.  Regular rate and rhythm.  Nonlabored aspiration.  Soft nontender nondistended abdomen.  No pedal edema bilaterally.      Medications     Reviewed MAR       Data     Recent Labs and Imaging Reviewed      Assessment / Plan        # Adrenal Insufficiency  Has been on prednisone 20 mg for the last 3 days.  Her heart rate and temperature have been perfect.  I do not understand why when she gets discharged home she Anthony presents with similar presentation.  The patient reports that she has 10 mg and 5 mg tablets.  She states that she has been taking 20 mg in the morning specifically.  I do question compliance given her stability when she is admitted.  I do understand she is going to a SNF for discharge.    # Hypoglycemia  No recurrence of the same this admission.  Continue lispro sliding scale with meals and at bedtime.  Continue point-of-care glucose testing.     # Uncontrolled Type 2 Diabetes Mellitus  Home Regimen: None.  Hemoglobin A1c (7/23): 6.9%  Nutrtion: Regular Diet.     As per problem #2.     # Osteoporosis  In the setting of chronic glucocorticoid use.  This will be further managed as " an outpatient.  She probably would benefit from a bisphosphonate, these agents are best studied with steroid-induced adynamic bone disease.         Avi Sloan, DO  Endocrinology, Diabetes, and Metabolism    Available via EPIC Messenger    Please excuse any typographical or unwanted errors within this documentation as voice recognition software was used to dictate this note.

## 2024-03-24 NOTE — CARE PLAN
Problem: Pain  Goal: My pain/discomfort is manageable  Outcome: Progressing     Problem: Safety  Goal: Patient will be injury free during hospitalization  Outcome: Progressing  Goal: I will remain free of falls  Outcome: Progressing     Problem: Daily Care  Goal: Daily care needs are met  Outcome: Progressing     Problem: Psychosocial Needs  Goal: Demonstrates ability to cope with hospitalization/illness  Outcome: Progressing  Goal: Collaborate with me, my family, and caregiver to identify my specific goals  Outcome: Progressing     Problem: Fall/Injury  Goal: Not fall by end of shift  Outcome: Progressing  Goal: Be free from injury by end of the shift  Outcome: Progressing  Goal: Verbalize understanding of personal risk factors for fall in the hospital  Outcome: Progressing  Goal: Verbalize understanding of risk factor reduction measures to prevent injury from fall in the home  Outcome: Progressing  Goal: Use assistive devices by end of the shift  Outcome: Progressing     Problem: Skin  Goal: Decreased wound size/increased tissue granulation at next dressing change  3/24/2024 0536 by Emerita Encinas RN  Outcome: Progressing  Flowsheets (Taken 3/24/2024 0536)  Decreased wound size/increased tissue granulation at next dressing change: Promote sleep for wound healing  3/24/2024 0534 by Emerita Encnias RN  Outcome: Progressing  Goal: Participates in plan/prevention/treatment measures  Outcome: Progressing  Goal: Prevent/manage excess moisture  Outcome: Progressing  Goal: Prevent/minimize sheer/friction injuries  Outcome: Progressing  Goal: Promote/optimize nutrition  Outcome: Progressing  Goal: Promote skin healing  Outcome: Progressing   The patient's goals for the shift include      The clinical goals for the shift include pt.willbe free ofconfusion

## 2024-03-24 NOTE — CARE PLAN
The patient's goals for the shift include      The clinical goals for the shift include pt.willbe free ofconfusion      Problem: Pain  Goal: My pain/discomfort is manageable  Outcome: Progressing     Problem: Safety  Goal: Patient will be injury free during hospitalization  Outcome: Progressing  Goal: I will remain free of falls  Outcome: Progressing     Problem: Daily Care  Goal: Daily care needs are met  Outcome: Progressing     Problem: Psychosocial Needs  Goal: Demonstrates ability to cope with hospitalization/illness  Outcome: Progressing  Goal: Collaborate with me, my family, and caregiver to identify my specific goals  Outcome: Progressing     Problem: Fall/Injury  Goal: Not fall by end of shift  Outcome: Progressing  Goal: Be free from injury by end of the shift  Outcome: Progressing  Goal: Verbalize understanding of personal risk factors for fall in the hospital  Outcome: Progressing  Goal: Verbalize understanding of risk factor reduction measures to prevent injury from fall in the home  Outcome: Progressing  Goal: Use assistive devices by end of the shift  Outcome: Progressing     Problem: Skin  Goal: Decreased wound size/increased tissue granulation at next dressing change  Outcome: Progressing  Goal: Participates in plan/prevention/treatment measures  Outcome: Progressing  Goal: Prevent/manage excess moisture  Outcome: Progressing  Goal: Prevent/minimize sheer/friction injuries  Outcome: Progressing  Goal: Promote/optimize nutrition  Outcome: Progressing  Goal: Promote skin healing  Outcome: Progressing

## 2024-03-24 NOTE — PROGRESS NOTES
Bing Holliday is a 63 y.o. female on day 3 of admission presenting with Generalized weakness.      Subjective   No acute events overnight.  Hypertensive overnight with SBP in the low 200's, an extra amlodipine 2.5mg was given.    Patient was examined at bedside this morning.  Alert and oriented x 4, with no complaints at this time although slightly fatigued.  Morning lab work demonstrating hyponatremia again, and review of intake/output patient has had minimal p.o. intake.  Otherwise pending precertification to SNF.       Objective     Last Recorded Vitals  /76 (BP Location: Right arm, Patient Position: Lying)   Pulse 56   Temp 35.5 °C (95.9 °F)   Resp 18   Wt 96.5 kg (212 lb 11.9 oz)   SpO2 100%   Intake/Output last 3 Shifts:    Intake/Output Summary (Last 24 hours) at 3/24/2024 0755  Last data filed at 3/24/2024 0744  Gross per 24 hour   Intake 150 ml   Output 2100 ml   Net -1950 ml       Admission Weight  Weight: 96.5 kg (212 lb 11.9 oz) (03/19/24 2221)    Daily Weight  03/19/24 : 96.5 kg (212 lb 11.9 oz)      Scheduled medications  amLODIPine, 5 mg, oral, Daily  apixaban, 2.5 mg, oral, BID  atorvastatin, 40 mg, oral, Daily  folic acid, 1 mg, oral, Daily  insulin lispro, 0-10 Units, subcutaneous, q4h  levETIRAcetam, 500 mg, oral, q12h  mycophenolate, 1,000 mg, oral, BID  nystatin, 1 Application, Topical, BID  pantoprazole, 40 mg, intravenous, Daily  potassium chloride, 40 mEq, oral, Once  predniSONE, 20 mg, oral, Daily  thiamine, 100 mg, oral, Daily      Continuous medications  dextrose 5 % in water (D5W), 75 mL/hr      PRN medications  PRN medications: dextrose, dextrose, diclofenac sodium, glucagon, ipratropium-albuteroL, melatonin, risperiDONE     Physical Exam  Constitutional: Fatigued, alert and oriented x 4, in no acute distress  Skin: Warm and dry, no lesions, no rashes  Eyes: Anicteric, clear sclera  ENMT: mucous membranes moist, no apparent injury, no lesions seen  Head/Neck:  Electrodiagnotic Laboratory  Accredited by the [facility] with Exemplary status  ALEJANDRA Lutz D.O. [facility]  [address] [place]  Phone: [phone]  Fax: [phone]        Today you had an electrodiagnostic exam which included nerve conduction studies (NCS) and electromyography (EMG). This test evaluated the electrical activity of your nerves and muscles to help determine if you have a nerve or muscle disease. This test can help determine the location and type of a nerve or muscle problem. This will help your referring doctor diagnose your condition and determine the appropriate next step in your treatment plan. After your test:    1. There are no long lasting side effects of the test.     2. You may resume your normal activities without restrictions. 3.  Resume any medications that were stopped for the test.     4  If you have sore areas or bruising in your muscles where the needle was placed, apply a cold pack to the sore area for 15-20 minutes three to four times a day as needed for pain. The soreness should go away in about 1-2 days. 5. Your results were provided  Briefly at the end of your test and the final detailed report will be provided to your referring physician, and/or primary care physician and any other parties you requested within 1-2 days of the examination. You may wish to contact your referring provider after a few days to determine what they would like you to do next. 6.  Please call [phone] with any questions or concerns and if you develop increased body temperature/fever, swelling, tenderness, increased pain and/or drainage from the sites where the needle was placed. Thank you for choosing us for your health care needs. Atraumatic  Respiratory: Lungs clear to auscultation bilaterally with no wheezes, rhonci or crackles  CV: Regular rate and rhythm, no murmurs or gallops auscultated  GI: Non-tender to deep palpation, no guarding  MSK: no joint swelling  Extremities: Minimal pitting edema bilateral lower extremities  Psych: Appropriate mood and behavior     Relevant Results  Results for orders placed or performed during the hospital encounter of 03/19/24 (from the past 24 hour(s))   POCT GLUCOSE   Result Value Ref Range    POCT Glucose 185 (H) 74 - 99 mg/dL   POCT GLUCOSE   Result Value Ref Range    POCT Glucose 208 (H) 74 - 99 mg/dL   POCT GLUCOSE   Result Value Ref Range    POCT Glucose 164 (H) 74 - 99 mg/dL   POCT GLUCOSE   Result Value Ref Range    POCT Glucose 145 (H) 74 - 99 mg/dL   POCT GLUCOSE   Result Value Ref Range    POCT Glucose 92 74 - 99 mg/dL   Renal function panel   Result Value Ref Range    Glucose 90 74 - 99 mg/dL    Sodium 148 (H) 136 - 145 mmol/L    Potassium 3.6 3.5 - 5.3 mmol/L    Chloride 112 (H) 98 - 107 mmol/L    Bicarbonate 27 21 - 32 mmol/L    Anion Gap 13 10 - 20 mmol/L    Urea Nitrogen 24 (H) 6 - 23 mg/dL    Creatinine 1.48 (H) 0.50 - 1.05 mg/dL    eGFR 40 (L) >60 mL/min/1.73m*2    Calcium 8.1 (L) 8.6 - 10.3 mg/dL    Phosphorus 2.7 2.5 - 4.9 mg/dL    Albumin 2.6 (L) 3.4 - 5.0 g/dL   Magnesium   Result Value Ref Range    Magnesium 1.80 1.60 - 2.40 mg/dL   CBC and Auto Differential   Result Value Ref Range    WBC 4.9 4.4 - 11.3 x10*3/uL    nRBC 3.7 (H) 0.0 - 0.0 /100 WBCs    RBC 3.02 (L) 4.00 - 5.20 x10*6/uL    Hemoglobin 9.1 (L) 12.0 - 16.0 g/dL    Hematocrit 31.9 (L) 36.0 - 46.0 %     (H) 80 - 100 fL    MCH 30.1 26.0 - 34.0 pg    MCHC 28.5 (L) 32.0 - 36.0 g/dL    RDW 19.4 (H) 11.5 - 14.5 %    Platelets 96 (L) 150 - 450 x10*3/uL    Neutrophils %      Immature Granulocytes %, Automated      Lymphocytes %      Monocytes %      Eosinophils %      Basophils %      Neutrophils Absolute      Lymphocytes  Absolute      Monocytes Absolute      Eosinophils Absolute      Basophils Absolute     POCT GLUCOSE   Result Value Ref Range    POCT Glucose 75 74 - 99 mg/dL        Image Results  MR brain w and wo IV contrast  Narrative: Interpreted By:  Jeri Smith,   STUDY:  MR BRAIN W AND WO IV CONTRAST;  3/20/2024 10:34 pm      INDICATION:  Signs/Symptoms:encephalopathy.      COMPARISON:  MRI of the brain dated 01/05/2024; same day noncontrast CT head      ACCESSION NUMBER(S):  [id number]      ORDERING CLINICIAN:  LISBET MAHER      TECHNIQUE:  Multiplanar and multisequence MRI of the brain was performed before  and after intravenous administration of 20 mL of Dotarem gadolinium  contrast.      FINDINGS:  Exam is degraded by motion.      No diffusion restriction abnormality is present to suggest an acute  infarct.      There is no imaging evidence to suggest acute intracranial  hemorrhage. No mass effect or midline shift is evident.      Hemosiderin staining is present within the area of cystic  encephalomalacia and gliosis in the left occipital lobe, likely  representing sequela of prior infarct/injury.      No ventricular dilatation is evident. Basal cisterns are patent. No  extra-axial fluid collections are identified.      Patchy and confluence areas of hyperintense FLAIR and T2 signal are  present in the periventricular and subcortical white matter of  bilateral cerebral hemispheres, nonspecific findings favored to  represent sequela of microvascular disease.      No pathologic intracranial enhancement is identified.      Visualized large arterial flow voids, including intracranial carotid  and basilar artery appear preserved.      Mild mucosal thickening is present in the inferior maxillary sinuses  bilaterally. No abnormal fluid signal is present in the mastoid air  cells bilaterally.      Impression: 1. Within limitations of somewhat motion degraded study, no evidence  of new intracranial infarct,  hemorrhage or pathologic enhancement.  2. Geographic area of cystic encephalomalacia and gliosis present in  the left occipital lobe likely represents chronic sequela of prior  infarct/injury, similar in appearance to prior exam.  3. Patchy and confluence areas of hyperintense FLAIR and T2 signal  are present in the periventricular and subcortical white matter of  bilateral cerebral hemispheres, nonspecific findings favored to  represent sequela of microvascular disease.      MACRO:  None      Signed by: Jeri Smith 3/20/2024 11:57 PM  Dictation workstation:   [id number]  CT chest abdomen pelvis wo IV contrast  Narrative: Interpreted By:  Richie Garcia,   STUDY:  CT CHEST ABDOMEN PELVIS WO CONTRAST;  3/20/2024 6:16 pm      INDICATION:  Signs/Symptoms:sepsis      COMPARISON:  CT of the chest, abdomen, and pelvis 01/14/2024      ACCESSION NUMBER(S):  [id number]      ORDERING CLINICIAN:  LISBET MAHER      TECHNIQUE:  CT of the chest, abdomen, and pelvis was performed.  Volumetric CT imaging through the chest, abdomen, and pelvis without  contrast.. Multiplanar reconstructions were processed on a separate  workstation.      FINDINGS:  CHEST:      Motion limited examination. Asymmetric mixed reticular and airspace  opacities of the right greater left central upper lobes and central  right and left lower lobes suggestive of either pneumonitis or  atypical pulmonary edema. No pneumothorax or effusion.      The thoracic aorta is unchanged with respect course, caliber contour.      The main pulmonary artery and its branches are unchanged with respect  course, caliber, and contour.      Cardiomegaly with severe coronary atherosclerotic calcifications and  probable background of coronary stents. Relative low density of  intravascular volume with respect to the vessel wall suggestive of  anemia. Osseous structures appear stable with chronic appearing  compression irregularity of T7. Similar appearing  background of  multilevel spondylosis. Minimal step-off of the mid sternum, presumed  secondary to motion artifact (series 500, image 72).      ABDOMEN:      Motion limited examination.      The nonenhanced liver, spleen, bilateral adrenal glands, pancreas,  and bilateral kidneys are grossly unchanged. No abnormal dilatation  of large or small bowel. Colonic diverticulosis. No obvious acute  diverticulitis is detected. The lower pelvis is suboptimally assessed  due to extensive beam hardening artifact from right hip arthroplasty.  The urinary bladder and lower pelvic organs are not adequately  assessed. There is significant wall thickening of the ascending colon  with minimal adjacent reticular changes. No evidence of abdominal  aortic aneurysm. Osseous structures appear stable. Chronic appearing  height loss of the L1 and L2 vertebral bodies. Severe multilevel  spondylosis.      Impression: CHEST  1.  Motion limited examination.  2. Relatively central mixed reticular and airspace opacities  suggestive either multifocal pneumonitis or atypical pulmonary edema.  3. Remaining intrathoracic findings appear stable since comparison CT  imaging 01/14/2024.      ABDOMEN - PELVIS  1.  Nonspecific wall thickening of the ascending colon suggestive of  a nonspecific colitis. Correlate for infectious, inflammatory, or  ischemic etiologies.  2. Colonic diverticulosis without evidence of acute diverticulitis.  3. Remaining findings appear stable since comparison CT imaging  01/14/2024.          MACRO:  None      Signed by: Richie Garcia 3/20/2024 8:02 PM  Dictation workstation:   [id number]  ECG 12 lead  Sinus bradycardia  Voltage criteria for left ventricular hypertrophy  Nonspecific T wave abnormality  Abnormal ECG  When compared with ECG of 09-MAR-2024 18:13,  Vent. rate has decreased BY  36 BPM  Nonspecific T wave abnormality no longer evident in Anterior leads  ECG 12 lead  Sinus bradycardia  Voltage criteria for left  ventricular hypertrophy  Nonspecific T wave abnormality  Abnormal ECG  When compared with ECG of 19-MAR-2024 22:44, (unconfirmed)  No significant change was found  CT head wo IV contrast  Narrative: Interpreted By:  Anselmo Chan,   STUDY:  CT HEAD WO IV CONTRAST;  3/20/2024 3:31 am      INDICATION:  Signs/Symptoms:AMS.      COMPARISON:  CT scan of the head 03/09/2024      ACCESSION NUMBER(S):  [id number]      ORDERING CLINICIAN:  GOMEZ CHAMBERS      TECHNIQUE:  Axial noncontrast CT images of the head.      FINDINGS:  BRAIN PARENCHYMA: Stable focal area of encephalomalacia in the left  occipital lobe likely sequela of remote infarct. Gray-white matter  interfaces are otherwise preserved. No mass, mass effect or midline  shift. Mild deep and periventricular white matter hypodensities are  nonspecific, but favored to represent chronic small vessel ischemic  changes. Calcifications bilateral basal ganglia are likely  physiologic.      HEMORRHAGE: No acute intracranial hemorrhage.  VENTRICLES and EXTRA-AXIAL SPACES: Mild volume loss with prominence  of the ventricles and sulci. EXTRACRANIAL SOFT TISSUES: Within normal  limits. PARANASAL SINUSES/MASTOIDS: The visualized paranasal sinuses  and mastoid air cells are aerated. CALVARIUM: No depressed skull  fracture. No destructive osseous lesion.      OTHER FINDINGS: Atherosclerotic calcification of the carotid siphons  and vertebral arteries..      Impression: No acute intracranial abnormality. If symptoms persist or there is  high clinical suspicion further evaluation with MRI can be considered.      Chronic changes as noted above.      MACRO:  None      Signed by: Anselmo Chan 3/20/2024 3:40 AM  Dictation workstation:   [id number]           Assessment/Plan   63-year-old female initially presenting for altered mental status and generalized weakness with nonspecific pain.  Significant history of SLE and acute arthropathy on CellCept, secondary adrenal  insufficiency on prednisone, CKD stage III, A-fib on Eliquis, CAD s/p CABG.  Initially received stress dose steroids with improvement in mentation, CT C/A/P and MRI brain without any acute pathology.  Endocrinology evaluated the patient, and patient was resumed on her home steroid regimen of prednisone 20 mg daily.  Intermittently hypernatremic, received D5 administration.    # Hypernatremia, likely hypovolemic  # CKD  -Hypernatremic at 148, free water deficit 2.8 L.  Prior urine osmolality indeterminate at 468, low suspicion for DI etiology given appropriate urine output  -Creatinine levels at baseline ~1.8  -Start D5W at 75 cc/h, encourage p.o. intake. Monitor intake/output. Appears to be net negative 3L. Rpt RFP this evening  -Dietician consulted for further dietary supplement reccomendations    # AMS, unclear etiology, resolved  -Infectious etiology ruled out, CT C/A/P negative  -dsDNA WNL, MRI brain without any acute pathology, low suspicion for lupus etiology  -Hallucinations, resumed home risperidone, improving    # Adrenal insufficiency, secondary  -Off of stress dose steroids, resume home prednisone 20 mg daily  -Possibly medication non-adherence contributing to presentation  -Endocrinology on consult, appreciate recommendations    # Hypertension  -Will switch from amlodipine to nifedipine 30mg every day for more acute blood pressure management    # A-fib on Eliquis  -Resume home Eliquis    DVT prophylaxis: Therapeutic Eliquis  Diet: Diabetic  Consults: Palliative care, endocrinology    Disposition: Initiate D5W for hypernatremia.  Pending precertification to a [facility].       To be staffed with attending,  López Pyle,   Internal Medicine PGY-3

## 2024-03-25 LAB
ACANTHOCYTES BLD QL SMEAR: ABNORMAL
ALBUMIN SERPL BCP-MCNC: 2.6 G/DL (ref 3.4–5)
ANION GAP SERPL CALC-SCNC: 14 MMOL/L (ref 10–20)
BASOPHILS # BLD MANUAL: 0 X10*3/UL (ref 0–0.1)
BASOPHILS NFR BLD MANUAL: 0 %
BUN SERPL-MCNC: 24 MG/DL (ref 6–23)
CALCIUM SERPL-MCNC: 7.8 MG/DL (ref 8.6–10.3)
CHLORIDE SERPL-SCNC: 110 MMOL/L (ref 98–107)
CO2 SERPL-SCNC: 23 MMOL/L (ref 21–32)
CREAT SERPL-MCNC: 1.54 MG/DL (ref 0.5–1.05)
EGFRCR SERPLBLD CKD-EPI 2021: 38 ML/MIN/1.73M*2
EOSINOPHIL # BLD MANUAL: 0 X10*3/UL (ref 0–0.7)
EOSINOPHIL NFR BLD MANUAL: 0 %
ERYTHROCYTE [DISTWIDTH] IN BLOOD BY AUTOMATED COUNT: 19.5 % (ref 11.5–14.5)
GLUCOSE BLD MANUAL STRIP-MCNC: 113 MG/DL (ref 74–99)
GLUCOSE BLD MANUAL STRIP-MCNC: 121 MG/DL (ref 74–99)
GLUCOSE BLD MANUAL STRIP-MCNC: 204 MG/DL (ref 74–99)
GLUCOSE BLD MANUAL STRIP-MCNC: 293 MG/DL (ref 74–99)
GLUCOSE BLD MANUAL STRIP-MCNC: 306 MG/DL (ref 74–99)
GLUCOSE BLD MANUAL STRIP-MCNC: 336 MG/DL (ref 74–99)
GLUCOSE BLD MANUAL STRIP-MCNC: 52 MG/DL (ref 74–99)
GLUCOSE BLD MANUAL STRIP-MCNC: 66 MG/DL (ref 74–99)
GLUCOSE BLD MANUAL STRIP-MCNC: 67 MG/DL (ref 74–99)
GLUCOSE SERPL-MCNC: 114 MG/DL (ref 74–99)
HCT VFR BLD AUTO: 32.8 % (ref 36–46)
HGB BLD-MCNC: 9 G/DL (ref 12–16)
IMM GRANULOCYTES # BLD AUTO: 0.29 X10*3/UL (ref 0–0.7)
IMM GRANULOCYTES NFR BLD AUTO: 7.4 % (ref 0–0.9)
LYMPHOCYTES # BLD MANUAL: 0.47 X10*3/UL (ref 1.2–4.8)
LYMPHOCYTES NFR BLD MANUAL: 12 %
MAGNESIUM SERPL-MCNC: 1.72 MG/DL (ref 1.6–2.4)
MCH RBC QN AUTO: 29.9 PG (ref 26–34)
MCHC RBC AUTO-ENTMCNC: 27.4 G/DL (ref 32–36)
MCV RBC AUTO: 109 FL (ref 80–100)
METAMYELOCYTES # BLD MANUAL: 0.04 X10*3/UL
METAMYELOCYTES NFR BLD MANUAL: 1 %
MONOCYTES # BLD MANUAL: 0.23 X10*3/UL (ref 0.1–1)
MONOCYTES NFR BLD MANUAL: 6 %
MYELOCYTES # BLD MANUAL: 0.16 X10*3/UL
MYELOCYTES NFR BLD MANUAL: 4 %
NEUTROPHILS # BLD MANUAL: 3 X10*3/UL (ref 1.2–7.7)
NEUTS BAND # BLD MANUAL: 0.04 X10*3/UL (ref 0–0.7)
NEUTS BAND NFR BLD MANUAL: 1 %
NEUTS SEG # BLD MANUAL: 2.96 X10*3/UL (ref 1.2–7)
NEUTS SEG NFR BLD MANUAL: 76 %
NRBC BLD-RTO: 4.1 /100 WBCS (ref 0–0)
OVALOCYTES BLD QL SMEAR: ABNORMAL
PHOSPHATE SERPL-MCNC: 2.3 MG/DL (ref 2.5–4.9)
PLATELET # BLD AUTO: 82 X10*3/UL (ref 150–450)
POTASSIUM SERPL-SCNC: 4.2 MMOL/L (ref 3.5–5.3)
RBC # BLD AUTO: 3.01 X10*6/UL (ref 4–5.2)
RBC MORPH BLD: ABNORMAL
SCHISTOCYTES BLD QL SMEAR: ABNORMAL
SODIUM SERPL-SCNC: 143 MMOL/L (ref 136–145)
SPHEROCYTES BLD QL SMEAR: ABNORMAL
TOTAL CELLS COUNTED BLD: 100
WBC # BLD AUTO: 3.9 X10*3/UL (ref 4.4–11.3)

## 2024-03-25 PROCEDURE — C9113 INJ PANTOPRAZOLE SODIUM, VIA: HCPCS

## 2024-03-25 PROCEDURE — 2500000005 HC RX 250 GENERAL PHARMACY W/O HCPCS

## 2024-03-25 PROCEDURE — 85027 COMPLETE CBC AUTOMATED: CPT

## 2024-03-25 PROCEDURE — 1100000001 HC PRIVATE ROOM DAILY

## 2024-03-25 PROCEDURE — 2500000002 HC RX 250 W HCPCS SELF ADMINISTERED DRUGS (ALT 637 FOR MEDICARE OP, ALT 636 FOR OP/ED): Performed by: STUDENT IN AN ORGANIZED HEALTH CARE EDUCATION/TRAINING PROGRAM

## 2024-03-25 PROCEDURE — 83735 ASSAY OF MAGNESIUM: CPT

## 2024-03-25 PROCEDURE — 94640 AIRWAY INHALATION TREATMENT: CPT

## 2024-03-25 PROCEDURE — 2500000001 HC RX 250 WO HCPCS SELF ADMINISTERED DRUGS (ALT 637 FOR MEDICARE OP)

## 2024-03-25 PROCEDURE — 2500000002 HC RX 250 W HCPCS SELF ADMINISTERED DRUGS (ALT 637 FOR MEDICARE OP, ALT 636 FOR OP/ED)

## 2024-03-25 PROCEDURE — 80069 RENAL FUNCTION PANEL: CPT

## 2024-03-25 PROCEDURE — 99232 SBSQ HOSP IP/OBS MODERATE 35: CPT | Performed by: STUDENT IN AN ORGANIZED HEALTH CARE EDUCATION/TRAINING PROGRAM

## 2024-03-25 PROCEDURE — 85007 BL SMEAR W/DIFF WBC COUNT: CPT

## 2024-03-25 PROCEDURE — 2500000004 HC RX 250 GENERAL PHARMACY W/ HCPCS (ALT 636 FOR OP/ED)

## 2024-03-25 PROCEDURE — 82947 ASSAY GLUCOSE BLOOD QUANT: CPT

## 2024-03-25 PROCEDURE — 36415 COLL VENOUS BLD VENIPUNCTURE: CPT

## 2024-03-25 RX ORDER — NIFEDIPINE 60 MG/1
60 TABLET, FILM COATED, EXTENDED RELEASE ORAL
Status: DISCONTINUED | OUTPATIENT
Start: 2024-03-26 | End: 2024-03-26 | Stop reason: HOSPADM

## 2024-03-25 RX ORDER — NIFEDIPINE 30 MG/1
30 TABLET, FILM COATED, EXTENDED RELEASE ORAL ONCE
Status: COMPLETED | OUTPATIENT
Start: 2024-03-25 | End: 2024-03-25

## 2024-03-25 RX ORDER — INSULIN LISPRO 100 [IU]/ML
0-10 INJECTION, SOLUTION INTRAVENOUS; SUBCUTANEOUS
Status: DISCONTINUED | OUTPATIENT
Start: 2024-03-25 | End: 2024-03-26 | Stop reason: HOSPADM

## 2024-03-25 RX ORDER — PANTOPRAZOLE SODIUM 40 MG/1
40 TABLET, DELAYED RELEASE ORAL
Status: DISCONTINUED | OUTPATIENT
Start: 2024-03-26 | End: 2024-03-26 | Stop reason: HOSPADM

## 2024-03-25 RX ADMIN — APIXABAN 2.5 MG: 2.5 TABLET, FILM COATED ORAL at 20:45

## 2024-03-25 RX ADMIN — NIFEDIPINE 30 MG: 30 TABLET, FILM COATED, EXTENDED RELEASE ORAL at 06:41

## 2024-03-25 RX ADMIN — Medication 5 MG: at 20:45

## 2024-03-25 RX ADMIN — NYSTATIN 1 APPLICATION: 100000 POWDER TOPICAL at 22:04

## 2024-03-25 RX ADMIN — LEVETIRACETAM 500 MG: 500 TABLET, FILM COATED ORAL at 12:23

## 2024-03-25 RX ADMIN — FOLIC ACID 1 MG: 1 TABLET ORAL at 08:13

## 2024-03-25 RX ADMIN — INSULIN LISPRO 6 UNITS: 100 INJECTION, SOLUTION INTRAVENOUS; SUBCUTANEOUS at 17:31

## 2024-03-25 RX ADMIN — NIFEDIPINE 30 MG: 30 TABLET, FILM COATED, EXTENDED RELEASE ORAL at 12:23

## 2024-03-25 RX ADMIN — NYSTATIN 1 APPLICATION: 100000 POWDER TOPICAL at 08:13

## 2024-03-25 RX ADMIN — DEXTROSE MONOHYDRATE 12.5 G: 25 INJECTION, SOLUTION INTRAVENOUS at 04:14

## 2024-03-25 RX ADMIN — APIXABAN 2.5 MG: 2.5 TABLET, FILM COATED ORAL at 08:13

## 2024-03-25 RX ADMIN — LEVETIRACETAM 500 MG: 500 TABLET, FILM COATED ORAL at 00:45

## 2024-03-25 RX ADMIN — IPRATROPIUM BROMIDE AND ALBUTEROL SULFATE 3 ML: 2.5; .5 SOLUTION RESPIRATORY (INHALATION) at 08:47

## 2024-03-25 RX ADMIN — LEVETIRACETAM 500 MG: 500 TABLET, FILM COATED ORAL at 23:51

## 2024-03-25 RX ADMIN — THIAMINE HCL TAB 100 MG 100 MG: 100 TAB at 08:13

## 2024-03-25 RX ADMIN — INSULIN LISPRO 4 UNITS: 100 INJECTION, SOLUTION INTRAVENOUS; SUBCUTANEOUS at 00:21

## 2024-03-25 RX ADMIN — MYCOPHENOLATE MOFETIL 1000 MG: 250 CAPSULE ORAL at 20:45

## 2024-03-25 RX ADMIN — INSULIN LISPRO 10 UNITS: 100 INJECTION, SOLUTION INTRAVENOUS; SUBCUTANEOUS at 12:23

## 2024-03-25 RX ADMIN — MYCOPHENOLATE MOFETIL 1000 MG: 250 CAPSULE ORAL at 08:13

## 2024-03-25 RX ADMIN — PREDNISONE 20 MG: 20 TABLET ORAL at 08:13

## 2024-03-25 RX ADMIN — ATORVASTATIN CALCIUM 40 MG: 40 TABLET, FILM COATED ORAL at 20:45

## 2024-03-25 RX ADMIN — PANTOPRAZOLE SODIUM 40 MG: 40 INJECTION, POWDER, FOR SOLUTION INTRAVENOUS at 08:13

## 2024-03-25 ASSESSMENT — COGNITIVE AND FUNCTIONAL STATUS - GENERAL
MOBILITY SCORE: 17
EATING MEALS: A LITTLE
TURNING FROM BACK TO SIDE WHILE IN FLAT BAD: A LITTLE
DRESSING REGULAR LOWER BODY CLOTHING: A LITTLE
STANDING UP FROM CHAIR USING ARMS: A LITTLE
PERSONAL GROOMING: A LITTLE
MOVING FROM LYING ON BACK TO SITTING ON SIDE OF FLAT BED WITH BEDRAILS: A LITTLE
WALKING IN HOSPITAL ROOM: A LITTLE
HELP NEEDED FOR BATHING: A LITTLE
MOVING TO AND FROM BED TO CHAIR: A LITTLE
DRESSING REGULAR UPPER BODY CLOTHING: A LITTLE
CLIMB 3 TO 5 STEPS WITH RAILING: A LOT
TOILETING: A LITTLE
DAILY ACTIVITIY SCORE: 18

## 2024-03-25 ASSESSMENT — PAIN SCALES - GENERAL
PAINLEVEL_OUTOF10: 0 - NO PAIN
PAINLEVEL_OUTOF10: 0 - NO PAIN

## 2024-03-25 ASSESSMENT — PAIN - FUNCTIONAL ASSESSMENT: PAIN_FUNCTIONAL_ASSESSMENT: 0-10

## 2024-03-25 NOTE — NURSING NOTE
SHIFT EVENT:  0357=67 ( BS WAS DONE FOR 80013 PT.ALERT & ORIENTED ASYMPTOMATIC. 4 0Z APPLE JUICE WAS GIVEN TOLERATED WELL.  0401= RECHECKED TOMAKE SURE SINCE PT. IS GETTING D5W INFUSING.  0409=BS=53= DEXTROSE D50% 12.5 GM GIVEN PER RPTOCOL.  0436=BS RECHECKED 121.  0437= DR. HALIE COFFMAN WAS NOTIFIEDOF ABOVE.

## 2024-03-25 NOTE — PROGRESS NOTES
03/25/24 0955   Discharge Planning   Patient expects to be discharged to: Referral to [facility]   Does the patient need discharge transport arranged? Yes   RoundTrip coordination needed? Yes   Has discharge transport been arranged? No     Message sent to direct pre cert team to check on the pre cert status and escalate as needed.

## 2024-03-25 NOTE — CARE PLAN
The patient's goals for the shift include    Problem: Pain  Goal: My pain/discomfort is manageable  Outcome: Progressing     Problem: Safety  Goal: Patient will be injury free during hospitalization  Outcome: Progressing  Goal: I will remain free of falls  Outcome: Progressing     Problem: Daily Care  Goal: Daily care needs are met  Outcome: Progressing     Problem: Psychosocial Needs  Goal: Demonstrates ability to cope with hospitalization/illness  Outcome: Progressing  Goal: Collaborate with me, my family, and caregiver to identify my specific goals  Outcome: Progressing     Problem: Discharge Barriers  Goal: My discharge needs are met  Outcome: Progressing     Problem: Fall/Injury  Goal: Not fall by end of shift  Outcome: Progressing  Goal: Be free from injury by end of the shift  Outcome: Progressing  Goal: Verbalize understanding of personal risk factors for fall in the hospital  Outcome: Progressing  Goal: Verbalize understanding of risk factor reduction measures to prevent injury from fall in the home  Outcome: Progressing  Goal: Use assistive devices by end of the shift  Outcome: Progressing  Goal: Pace activities to prevent fatigue by end of the shift  Outcome: Progressing     Problem: Skin  Goal: Decreased wound size/increased tissue granulation at next dressing change  Outcome: Progressing  Flowsheets (Taken 3/25/2024 0914)  Decreased wound size/increased tissue granulation at next dressing change: Promote sleep for wound healing  Goal: Participates in plan/prevention/treatment measures  Outcome: Progressing  Flowsheets (Taken 3/25/2024 0914)  Participates in plan/prevention/treatment measures: Elevate heels  Goal: Prevent/manage excess moisture  Outcome: Progressing  Flowsheets (Taken 3/25/2024 0914)  Prevent/manage excess moisture:   Cleanse incontinence/protect with barrier cream   Follow provider orders for dressing changes   Moisturize dry skin  Goal: Prevent/minimize sheer/friction  injuries  Outcome: Progressing  Flowsheets (Taken 3/25/2024 0914)  Prevent/minimize sheer/friction injuries:   Turn/reposition every 2 hours/use positioning/transfer devices   Complete micro-shifts as needed if patient unable. Adjust patient position to relieve pressure points, not a full turn  Goal: Promote/optimize nutrition  Outcome: Progressing  Flowsheets (Taken 3/25/2024 0914)  Promote/optimize nutrition:   Consume > 50% meals/supplements   Monitor/record intake including meals  Goal: Promote skin healing  Outcome: Progressing  Flowsheets (Taken 3/25/2024 0914)  Promote skin healing: Turn/reposition every 2 hours/use positioning/transfer devices       The clinical goals for the shift include pt will be free of hallucinations this shift

## 2024-03-25 NOTE — PROGRESS NOTES
Bing Holliday is a 63 y.o. female on day 4 of admission presenting with Generalized weakness.      Subjective   Overnight was hypoglycemic, asymptomatic, improved with D50 administration.    Patient was evaluated at bedside this morning.  Remains alert and oriented x 4. Did discuss with patient in regards to her hypernatremia likely due to dehydration, to which she was agreeable with increasing her PO intake. Continues to await precertification to SNF.       Objective     Last Recorded Vitals  /68 (BP Location: Right arm, Patient Position: Lying)   Pulse 71   Temp 35.6 °C (96.1 °F) (Temporal)   Resp 17   Wt 96.5 kg (212 lb 11.9 oz)   SpO2 97%   Intake/Output last 3 Shifts:    Intake/Output Summary (Last 24 hours) at 3/25/2024 1346  Last data filed at 3/25/2024 1000  Gross per 24 hour   Intake 2350 ml   Output --   Net 2350 ml       Admission Weight  Weight: 96.5 kg (212 lb 11.9 oz) (03/19/24 2221)    Daily Weight  03/19/24 : 96.5 kg (212 lb 11.9 oz)      Scheduled medications  apixaban, 2.5 mg, oral, BID  atorvastatin, 40 mg, oral, Daily  folic acid, 1 mg, oral, Daily  insulin lispro, 0-10 Units, subcutaneous, TID with meals  levETIRAcetam, 500 mg, oral, q12h  mycophenolate, 1,000 mg, oral, BID  [START ON 3/26/2024] NIFEdipine ER, 60 mg, oral, Daily before breakfast  nystatin, 1 Application, Topical, BID  [START ON 3/26/2024] pantoprazole, 40 mg, oral, Daily before breakfast  predniSONE, 20 mg, oral, Daily  thiamine, 100 mg, oral, Daily      Continuous medications     PRN medications  PRN medications: dextrose, dextrose, diclofenac sodium, glucagon, ipratropium-albuteroL, melatonin, risperiDONE     Physical Exam  Constitutional: Fatigued, alert and oriented x 4, in no acute distress  Skin: Warm and dry, no lesions, no rashes  Eyes: Anicteric, clear sclera  ENMT: mucous membranes moist, no apparent injury, no lesions seen  Head/Neck: Atraumatic  Respiratory: Lungs clear to auscultation bilaterally with  no wheezes, rhonci or crackles  CV: Regular rate and rhythm, no murmurs or gallops auscultated  GI: Non-tender to deep palpation, no guarding  MSK: no joint swelling  Extremities: Minimal pitting edema bilateral lower extremities  Psych: Appropriate mood and behavior     Relevant Results  Results for orders placed or performed during the hospital encounter of 03/19/24 (from the past 24 hour(s))   POCT GLUCOSE   Result Value Ref Range    POCT Glucose 291 (H) 74 - 99 mg/dL   Renal function panel   Result Value Ref Range    Glucose 330 (H) 74 - 99 mg/dL    Sodium 138 136 - 145 mmol/L    Potassium 5.0 3.5 - 5.3 mmol/L    Chloride 107 98 - 107 mmol/L    Bicarbonate 25 21 - 32 mmol/L    Anion Gap 11 10 - 20 mmol/L    Urea Nitrogen 25 (H) 6 - 23 mg/dL    Creatinine 1.56 (H) 0.50 - 1.05 mg/dL    eGFR 37 (L) >60 mL/min/1.73m*2    Calcium 7.9 (L) 8.6 - 10.3 mg/dL    Phosphorus 2.3 (L) 2.5 - 4.9 mg/dL    Albumin 2.8 (L) 3.4 - 5.0 g/dL   POCT GLUCOSE   Result Value Ref Range    POCT Glucose 315 (H) 74 - 99 mg/dL   POCT GLUCOSE   Result Value Ref Range    POCT Glucose 204 (H) 74 - 99 mg/dL   POCT GLUCOSE   Result Value Ref Range    POCT Glucose 67 (L) 74 - 99 mg/dL   POCT GLUCOSE   Result Value Ref Range    POCT Glucose 66 (L) 74 - 99 mg/dL   POCT GLUCOSE   Result Value Ref Range    POCT Glucose 52 (L) 74 - 99 mg/dL   POCT GLUCOSE   Result Value Ref Range    POCT Glucose 121 (H) 74 - 99 mg/dL   Renal function panel   Result Value Ref Range    Glucose 114 (H) 74 - 99 mg/dL    Sodium 143 136 - 145 mmol/L    Potassium 4.2 3.5 - 5.3 mmol/L    Chloride 110 (H) 98 - 107 mmol/L    Bicarbonate 23 21 - 32 mmol/L    Anion Gap 14 10 - 20 mmol/L    Urea Nitrogen 24 (H) 6 - 23 mg/dL    Creatinine 1.54 (H) 0.50 - 1.05 mg/dL    eGFR 38 (L) >60 mL/min/1.73m*2    Calcium 7.8 (L) 8.6 - 10.3 mg/dL    Phosphorus 2.3 (L) 2.5 - 4.9 mg/dL    Albumin 2.6 (L) 3.4 - 5.0 g/dL   Magnesium   Result Value Ref Range    Magnesium 1.72 1.60 - 2.40 mg/dL   CBC  and Auto Differential   Result Value Ref Range    WBC 3.9 (L) 4.4 - 11.3 x10*3/uL    nRBC 4.1 (H) 0.0 - 0.0 /100 WBCs    RBC 3.01 (L) 4.00 - 5.20 x10*6/uL    Hemoglobin 9.0 (L) 12.0 - 16.0 g/dL    Hematocrit 32.8 (L) 36.0 - 46.0 %     (H) 80 - 100 fL    MCH 29.9 26.0 - 34.0 pg    MCHC 27.4 (L) 32.0 - 36.0 g/dL    RDW 19.5 (H) 11.5 - 14.5 %    Platelets 82 (L) 150 - 450 x10*3/uL    Immature Granulocytes %, Automated 7.4 (H) 0.0 - 0.9 %    Immature Granulocytes Absolute, Automated 0.29 0.00 - 0.70 x10*3/uL   Manual Differential   Result Value Ref Range    Neutrophils %, Manual 76.0 40.0 - 80.0 %    Bands %, Manual 1.0 0.0 - 5.0 %    Lymphocytes %, Manual 12.0 13.0 - 44.0 %    Monocytes %, Manual 6.0 2.0 - 10.0 %    Eosinophils %, Manual 0.0 0.0 - 6.0 %    Basophils %, Manual 0.0 0.0 - 2.0 %    Metamyelocytes %, Manual 1.0 0.0 - 0.0 %    Myelocytes %, Manual 4.0 0.0 - 0.0 %    Seg Neutrophils Absolute, Manual 2.96 1.20 - 7.00 x10*3/uL    Bands Absolute, Manual 0.04 0.00 - 0.70 x10*3/uL    Lymphocytes Absolute, Manual 0.47 (L) 1.20 - 4.80 x10*3/uL    Monocytes Absolute, Manual 0.23 0.10 - 1.00 x10*3/uL    Eosinophils Absolute, Manual 0.00 0.00 - 0.70 x10*3/uL    Basophils Absolute, Manual 0.00 0.00 - 0.10 x10*3/uL    Metamyelocytes Absolute, Manual 0.04 0.00 - 0.00 x10*3/uL    Myelocytes Absolute, Manual 0.16 0.00 - 0.00 x10*3/uL    Total Cells Counted 100     Neutrophils Absolute, Manual 3.00 1.20 - 7.70 x10*3/uL    RBC Morphology See Below     RBC Fragments Few     Spherocytes Few     Ovalocytes Few     Acanthocytes Few    POCT GLUCOSE   Result Value Ref Range    POCT Glucose 113 (H) 74 - 99 mg/dL   POCT GLUCOSE   Result Value Ref Range    POCT Glucose 336 (H) 74 - 99 mg/dL        Image Results  MR brain w and wo IV contrast  Narrative: Interpreted By:  Jeri Smith,   STUDY:  MR BRAIN W AND WO IV CONTRAST;  3/20/2024 10:34 pm      INDICATION:  Signs/Symptoms:encephalopathy.      COMPARISON:  MRI of  the brain dated 01/05/2024; same day noncontrast CT head      ACCESSION NUMBER(S):  [id number]      ORDERING CLINICIAN:  LISBET MAHER      TECHNIQUE:  Multiplanar and multisequence MRI of the brain was performed before  and after intravenous administration of 20 mL of Dotarem gadolinium  contrast.      FINDINGS:  Exam is degraded by motion.      No diffusion restriction abnormality is present to suggest an acute  infarct.      There is no imaging evidence to suggest acute intracranial  hemorrhage. No mass effect or midline shift is evident.      Hemosiderin staining is present within the area of cystic  encephalomalacia and gliosis in the left occipital lobe, likely  representing sequela of prior infarct/injury.      No ventricular dilatation is evident. Basal cisterns are patent. No  extra-axial fluid collections are identified.      Patchy and confluence areas of hyperintense FLAIR and T2 signal are  present in the periventricular and subcortical white matter of  bilateral cerebral hemispheres, nonspecific findings favored to  represent sequela of microvascular disease.      No pathologic intracranial enhancement is identified.      Visualized large arterial flow voids, including intracranial carotid  and basilar artery appear preserved.      Mild mucosal thickening is present in the inferior maxillary sinuses  bilaterally. No abnormal fluid signal is present in the mastoid air  cells bilaterally.      Impression: 1. Within limitations of somewhat motion degraded study, no evidence  of new intracranial infarct, hemorrhage or pathologic enhancement.  2. Geographic area of cystic encephalomalacia and gliosis present in  the left occipital lobe likely represents chronic sequela of prior  infarct/injury, similar in appearance to prior exam.  3. Patchy and confluence areas of hyperintense FLAIR and T2 signal  are present in the periventricular and subcortical white matter of  bilateral cerebral hemispheres,  nonspecific findings favored to  represent sequela of microvascular disease.      MACRO:  None      Signed by: Jeri Smith 3/20/2024 11:57 PM  Dictation workstation:   [id number]  CT chest abdomen pelvis wo IV contrast  Narrative: Interpreted By:  Richie Garcia,   STUDY:  CT CHEST ABDOMEN PELVIS WO CONTRAST;  3/20/2024 6:16 pm      INDICATION:  Signs/Symptoms:sepsis      COMPARISON:  CT of the chest, abdomen, and pelvis 01/14/2024      ACCESSION NUMBER(S):  [id number]      ORDERING CLINICIAN:  LISBET MAHER      TECHNIQUE:  CT of the chest, abdomen, and pelvis was performed.  Volumetric CT imaging through the chest, abdomen, and pelvis without  contrast.. Multiplanar reconstructions were processed on a separate  workstation.      FINDINGS:  CHEST:      Motion limited examination. Asymmetric mixed reticular and airspace  opacities of the right greater left central upper lobes and central  right and left lower lobes suggestive of either pneumonitis or  atypical pulmonary edema. No pneumothorax or effusion.      The thoracic aorta is unchanged with respect course, caliber contour.      The main pulmonary artery and its branches are unchanged with respect  course, caliber, and contour.      Cardiomegaly with severe coronary atherosclerotic calcifications and  probable background of coronary stents. Relative low density of  intravascular volume with respect to the vessel wall suggestive of  anemia. Osseous structures appear stable with chronic appearing  compression irregularity of T7. Similar appearing background of  multilevel spondylosis. Minimal step-off of the mid sternum, presumed  secondary to motion artifact (series 500, image 72).      ABDOMEN:      Motion limited examination.      The nonenhanced liver, spleen, bilateral adrenal glands, pancreas,  and bilateral kidneys are grossly unchanged. No abnormal dilatation  of large or small bowel. Colonic diverticulosis. No obvious  acute  diverticulitis is detected. The lower pelvis is suboptimally assessed  due to extensive beam hardening artifact from right hip arthroplasty.  The urinary bladder and lower pelvic organs are not adequately  assessed. There is significant wall thickening of the ascending colon  with minimal adjacent reticular changes. No evidence of abdominal  aortic aneurysm. Osseous structures appear stable. Chronic appearing  height loss of the L1 and L2 vertebral bodies. Severe multilevel  spondylosis.      Impression: CHEST  1.  Motion limited examination.  2. Relatively central mixed reticular and airspace opacities  suggestive either multifocal pneumonitis or atypical pulmonary edema.  3. Remaining intrathoracic findings appear stable since comparison CT  imaging 01/14/2024.      ABDOMEN - PELVIS  1.  Nonspecific wall thickening of the ascending colon suggestive of  a nonspecific colitis. Correlate for infectious, inflammatory, or  ischemic etiologies.  2. Colonic diverticulosis without evidence of acute diverticulitis.  3. Remaining findings appear stable since comparison CT imaging  01/14/2024.          MACRO:  None      Signed by: Richie Garcia 3/20/2024 8:02 PM  Dictation workstation:   [id number]  ECG 12 lead  Sinus bradycardia  Voltage criteria for left ventricular hypertrophy  Nonspecific T wave abnormality  Abnormal ECG  When compared with ECG of 09-MAR-2024 18:13,  Vent. rate has decreased BY  36 BPM  Nonspecific T wave abnormality no longer evident in Anterior leads  ECG 12 lead  Sinus bradycardia  Voltage criteria for left ventricular hypertrophy  Nonspecific T wave abnormality  Abnormal ECG  When compared with ECG of 19-MAR-2024 22:44, (unconfirmed)  No significant change was found  CT head wo IV contrast  Narrative: Interpreted By:  Anselmo Chan,   STUDY:  CT HEAD WO IV CONTRAST;  3/20/2024 3:31 am      INDICATION:  Signs/Symptoms:AMS.      COMPARISON:  CT scan of the head 03/09/2024      ACCESSION  NUMBER(S):  [id number]      ORDERING CLINICIAN:  GOMEZ CHAMBERS      TECHNIQUE:  Axial noncontrast CT images of the head.      FINDINGS:  BRAIN PARENCHYMA: Stable focal area of encephalomalacia in the left  occipital lobe likely sequela of remote infarct. Gray-white matter  interfaces are otherwise preserved. No mass, mass effect or midline  shift. Mild deep and periventricular white matter hypodensities are  nonspecific, but favored to represent chronic small vessel ischemic  changes. Calcifications bilateral basal ganglia are likely  physiologic.      HEMORRHAGE: No acute intracranial hemorrhage.  VENTRICLES and EXTRA-AXIAL SPACES: Mild volume loss with prominence  of the ventricles and sulci. EXTRACRANIAL SOFT TISSUES: Within normal  limits. PARANASAL SINUSES/MASTOIDS: The visualized paranasal sinuses  and mastoid air cells are aerated. CALVARIUM: No depressed skull  fracture. No destructive osseous lesion.      OTHER FINDINGS: Atherosclerotic calcification of the carotid siphons  and vertebral arteries..      Impression: No acute intracranial abnormality. If symptoms persist or there is  high clinical suspicion further evaluation with MRI can be considered.      Chronic changes as noted above.      MACRO:  None      Signed by: Anselmo Chan 3/20/2024 3:40 AM  Dictation workstation:   [id number]           Assessment/Plan   63-year-old female initially presenting for altered mental status and generalized weakness with nonspecific pain.  Significant history of SLE and acute arthropathy on CellCept, secondary adrenal insufficiency on prednisone, CKD stage III, A-fib on Eliquis, CAD s/p CABG.  Initially received stress dose steroids with improvement in mentation, CT C/A/P and MRI brain without any acute pathology.  Endocrinology evaluated the patient, and patient was resumed on her home steroid regimen of prednisone 20 mg daily.  Intermittently hypernatremic, received D5 administration.     # Hypernatremia,  likely hypovolemic  # CKD  -Hypernatremia resolved after D5W administration, however on morning labs continues to trend upwards. Will continue to monitor RFP. Educated patient on increasing her PO intake  -Creatinine levels at baseline ~1.8  -Dietician consulted for further dietary supplement reccomendations     # AMS, unclear etiology, resolved  -Infectious etiology ruled out, CT C/A/P negative  -dsDNA WNL, MRI brain without any acute pathology, low suspicion for lupus etiology  -Hallucinations, resumed home risperidone, improving     # Adrenal insufficiency, secondary  #DM2  -Off of stress dose steroids, resume home prednisone 20 mg daily  -Labile blood sugars, would hesitate to initiate basal insulin therapy given her tendency to be hypoglycemic. Continue sliding scale  -Possibly medication non-adherence contributing to presentation  -Endocrinology on consult, appreciate recommendations     # Hypertension  -Remains hypertensive, although asymptomatic. Will dose an extra nifedipine 30mg today and increase nifedipine 30mg -> 60mg every day starting tomorrow     # A-fib on Eliquis  -Resume home Eliquis     DVT prophylaxis: Therapeutic Eliquis  Diet: Diabetic  Consults: Palliative care, endocrinology     Disposition: Continuing to monitor sodium levels and adjusting blood pressure regimen. Pending precertification to [facility].         To be staffed with attending,  López Pyle, DO  Internal Medicine PGY-3

## 2024-03-25 NOTE — PROGRESS NOTES
"    Endocrinology Inpatient Consult Progress Note     PATIENT NAME: Bing Holliday  MRN: [id number]  DATE: 3/25/2024    CONSULTING PHYSICIAN: Dr. Haywood  REASON FOR CONSULT: AI. T2DM. Hypoglycemia.      Interval Events     Episode of hypoglycemia yesterday evening.  The patient was initially given a snack and then given dextrose.  Patient states that she was sleeping.  She was awoken to have her sugar checked.  She states that she had no symptoms whatsoever.  She was given apple juice and did not feel any different.  She states that she has been finishing all of her meals.      Physical Examination     /75   Pulse 60   Temp 35.7 °C (96.3 °F)   Resp 18   Ht 1.676 m (5' 5.98\")   Wt 96.5 kg (212 lb 11.9 oz)   LMP  (LMP Unknown)   SpO2 100%   BMI 34.35 kg/m²   No acute distress.  Appropriate affect.  Frontal alopecia.  Regular rate and rhythm.  Nonlabored aspiration.  Soft nontender nondistended abdomen.  No pedal edema bilaterally.      Medications     Reviewed MAR       Data     Recent Labs and Imaging Reviewed      Assessment / Plan        # Adrenal Insufficiency  Reviewed vitals, for the last 24 hours the patient's lowest temperature was found to be 95.9.  Her lowest heart rate was 52.  She has been maintained on prednisone 20 mg daily.  Hypertensive on the same.  She remained stable on this dose of prednisone, as stated in the past this is a supraphysiologic dose.  My recommendation is to continue the same.  I counseled her on the importance of her glucocorticoids.  The patient understood.    # Hypoglycemia  Patient has sugar 67 mg/dL 4 AM.  Given juice.  Repeat sugar was found to be 50 to 10 minutes later.  This is postprandial hypoglycemia?  She received dextrose with improvement of sugars.  She has she has been hyperglycemic throughout the day.  She is only on sliding scale insulin.  For now no intervention on her borderline low sugars.  The patient did not even have any symptoms, and was " sleeping, thus she does not fulfill [place].  I will change her sugars to be drawn with meals and at bedtime.     # Uncontrolled Type 2 Diabetes Mellitus  Home Regimen: None.  Hemoglobin A1c (7/23): 6.9%  Nutrtion: Regular Diet.     As per problem #2.     # Osteoporosis  In the setting of chronic glucocorticoid use.  This will be further managed as an outpatient.  She probably would benefit from a bisphosphonate, these agents are best studied with steroid-induced adynamic bone disease.         Avi Sloan, DO  Endocrinology, Diabetes, and Metabolism    Available via [employer] Messenger    Please excuse any typographical or unwanted errors within this documentation as voice recognition software was used to dictate this note.

## 2024-03-25 NOTE — CARE PLAN
The patient's goals for the shift include      The clinical goals for the shift include free of confusion and hallucination    Problem: Pain  Goal: My pain/discomfort is manageable  Outcome: Progressing     Problem: Safety  Goal: Patient will be injury free during hospitalization  Outcome: Progressing  Goal: I will remain free of falls  Outcome: Progressing     Problem: Psychosocial Needs  Goal: Demonstrates ability to cope with hospitalization/illness  Outcome: Progressing  Goal: Collaborate with me, my family, and caregiver to identify my specific goals  Outcome: Progressing     Problem: Fall/Injury  Goal: Not fall by end of shift  Outcome: Progressing  Goal: Be free from injury by end of the shift  Outcome: Progressing  Goal: Verbalize understanding of personal risk factors for fall in the hospital  Outcome: Progressing  Goal: Verbalize understanding of risk factor reduction measures to prevent injury from fall in the home  Outcome: Progressing  Goal: Use assistive devices by end of the shift  Outcome: Progressing     Problem: Skin  Goal: Decreased wound size/increased tissue granulation at next dressing change  Outcome: Progressing  Goal: Participates in plan/prevention/treatment measures  Outcome: Progressing  Goal: Prevent/manage excess moisture  Outcome: Progressing  Goal: Prevent/minimize sheer/friction injuries  Outcome: Progressing  Goal: Promote/optimize nutrition  Outcome: Progressing  Goal: Promote skin healing  Outcome: Progressing

## 2024-03-26 VITALS
RESPIRATION RATE: 17 BRPM | HEART RATE: 72 BPM | OXYGEN SATURATION: 95 % | TEMPERATURE: 96.8 F | SYSTOLIC BLOOD PRESSURE: 123 MMHG | DIASTOLIC BLOOD PRESSURE: 67 MMHG | BODY MASS INDEX: 34.19 KG/M2 | WEIGHT: 212.74 LBS | HEIGHT: 66 IN

## 2024-03-26 PROBLEM — R53.1 GENERALIZED WEAKNESS: Status: RESOLVED | Noted: 2024-03-20 | Resolved: 2024-03-26

## 2024-03-26 LAB
ALBUMIN SERPL BCP-MCNC: 2.6 G/DL (ref 3.4–5)
ANION GAP SERPL CALC-SCNC: 9 MMOL/L (ref 10–20)
BASOPHILS # BLD MANUAL: 0 X10*3/UL (ref 0–0.1)
BASOPHILS NFR BLD MANUAL: 0 %
BUN SERPL-MCNC: 23 MG/DL (ref 6–23)
CALCIUM SERPL-MCNC: 8.1 MG/DL (ref 8.6–10.3)
CHLORIDE SERPL-SCNC: 109 MMOL/L (ref 98–107)
CO2 SERPL-SCNC: 28 MMOL/L (ref 21–32)
CREAT SERPL-MCNC: 1.6 MG/DL (ref 0.5–1.05)
EGFRCR SERPLBLD CKD-EPI 2021: 36 ML/MIN/1.73M*2
EOSINOPHIL # BLD MANUAL: 0 X10*3/UL (ref 0–0.7)
EOSINOPHIL NFR BLD MANUAL: 0 %
ERYTHROCYTE [DISTWIDTH] IN BLOOD BY AUTOMATED COUNT: 19.5 % (ref 11.5–14.5)
GIANT PLATELETS BLD QL SMEAR: ABNORMAL
GLUCOSE BLD MANUAL STRIP-MCNC: 163 MG/DL (ref 74–99)
GLUCOSE BLD MANUAL STRIP-MCNC: 199 MG/DL (ref 74–99)
GLUCOSE SERPL-MCNC: 231 MG/DL (ref 74–99)
HCT VFR BLD AUTO: 29.5 % (ref 36–46)
HGB BLD-MCNC: 8.7 G/DL (ref 12–16)
IMM GRANULOCYTES # BLD AUTO: 0.25 X10*3/UL (ref 0–0.7)
IMM GRANULOCYTES NFR BLD AUTO: 5.4 % (ref 0–0.9)
LYMPHOCYTES # BLD MANUAL: 0.8 X10*3/UL (ref 1.2–4.8)
LYMPHOCYTES NFR BLD MANUAL: 17 %
MAGNESIUM SERPL-MCNC: 1.51 MG/DL (ref 1.6–2.4)
MCH RBC QN AUTO: 30.1 PG (ref 26–34)
MCHC RBC AUTO-ENTMCNC: 29.5 G/DL (ref 32–36)
MCV RBC AUTO: 102 FL (ref 80–100)
MONOCYTES # BLD MANUAL: 0.09 X10*3/UL (ref 0.1–1)
MONOCYTES NFR BLD MANUAL: 2 %
MYELOCYTES # BLD MANUAL: 0.19 X10*3/UL
MYELOCYTES NFR BLD MANUAL: 4 %
NEUTROPHILS # BLD MANUAL: 3.62 X10*3/UL (ref 1.2–7.7)
NEUTS BAND # BLD MANUAL: 0.05 X10*3/UL (ref 0–0.7)
NEUTS BAND NFR BLD MANUAL: 1 %
NEUTS SEG # BLD MANUAL: 3.57 X10*3/UL (ref 1.2–7)
NEUTS SEG NFR BLD MANUAL: 76 %
NRBC BLD-RTO: 2.4 /100 WBCS (ref 0–0)
PHOSPHATE SERPL-MCNC: 2.4 MG/DL (ref 2.5–4.9)
PLATELET # BLD AUTO: 83 X10*3/UL (ref 150–450)
POLYCHROMASIA BLD QL SMEAR: ABNORMAL
POTASSIUM SERPL-SCNC: 4.2 MMOL/L (ref 3.5–5.3)
RBC # BLD AUTO: 2.89 X10*6/UL (ref 4–5.2)
RBC MORPH BLD: ABNORMAL
SODIUM SERPL-SCNC: 142 MMOL/L (ref 136–145)
TOTAL CELLS COUNTED BLD: 100
WBC # BLD AUTO: 4.7 X10*3/UL (ref 4.4–11.3)

## 2024-03-26 PROCEDURE — 97535 SELF CARE MNGMENT TRAINING: CPT | Mod: GO,CO

## 2024-03-26 PROCEDURE — 2500000002 HC RX 250 W HCPCS SELF ADMINISTERED DRUGS (ALT 637 FOR MEDICARE OP, ALT 636 FOR OP/ED): Performed by: STUDENT IN AN ORGANIZED HEALTH CARE EDUCATION/TRAINING PROGRAM

## 2024-03-26 PROCEDURE — 97530 THERAPEUTIC ACTIVITIES: CPT | Mod: GP,CQ

## 2024-03-26 PROCEDURE — 99239 HOSP IP/OBS DSCHRG MGMT >30: CPT | Performed by: STUDENT IN AN ORGANIZED HEALTH CARE EDUCATION/TRAINING PROGRAM

## 2024-03-26 PROCEDURE — 80069 RENAL FUNCTION PANEL: CPT

## 2024-03-26 PROCEDURE — 2500000001 HC RX 250 WO HCPCS SELF ADMINISTERED DRUGS (ALT 637 FOR MEDICARE OP)

## 2024-03-26 PROCEDURE — 82947 ASSAY GLUCOSE BLOOD QUANT: CPT

## 2024-03-26 PROCEDURE — 97116 GAIT TRAINING THERAPY: CPT | Mod: GP,CQ

## 2024-03-26 PROCEDURE — 85027 COMPLETE CBC AUTOMATED: CPT

## 2024-03-26 PROCEDURE — 2500000004 HC RX 250 GENERAL PHARMACY W/ HCPCS (ALT 636 FOR OP/ED)

## 2024-03-26 PROCEDURE — 83735 ASSAY OF MAGNESIUM: CPT

## 2024-03-26 PROCEDURE — 85007 BL SMEAR W/DIFF WBC COUNT: CPT

## 2024-03-26 PROCEDURE — 2500000004 HC RX 250 GENERAL PHARMACY W/ HCPCS (ALT 636 FOR OP/ED): Performed by: STUDENT IN AN ORGANIZED HEALTH CARE EDUCATION/TRAINING PROGRAM

## 2024-03-26 PROCEDURE — 36415 COLL VENOUS BLD VENIPUNCTURE: CPT

## 2024-03-26 RX ORDER — NIFEDIPINE 60 MG/1
60 TABLET, FILM COATED, EXTENDED RELEASE ORAL
Qty: 30 TABLET | Refills: 0 | Status: SHIPPED | OUTPATIENT
Start: 2024-03-27 | End: 2024-04-30 | Stop reason: HOSPADM

## 2024-03-26 RX ORDER — MAGNESIUM SULFATE HEPTAHYDRATE 40 MG/ML
2 INJECTION, SOLUTION INTRAVENOUS ONCE
Status: COMPLETED | OUTPATIENT
Start: 2024-03-26 | End: 2024-03-26

## 2024-03-26 RX ADMIN — NIFEDIPINE 60 MG: 60 TABLET, FILM COATED, EXTENDED RELEASE ORAL at 06:42

## 2024-03-26 RX ADMIN — THIAMINE HCL TAB 100 MG 100 MG: 100 TAB at 09:16

## 2024-03-26 RX ADMIN — MAGNESIUM SULFATE HEPTAHYDRATE 2 G: 40 INJECTION, SOLUTION INTRAVENOUS at 09:18

## 2024-03-26 RX ADMIN — INSULIN LISPRO 2 UNITS: 100 INJECTION, SOLUTION INTRAVENOUS; SUBCUTANEOUS at 12:30

## 2024-03-26 RX ADMIN — PREDNISONE 20 MG: 20 TABLET ORAL at 09:15

## 2024-03-26 RX ADMIN — NYSTATIN 1 APPLICATION: 100000 POWDER TOPICAL at 09:16

## 2024-03-26 RX ADMIN — INSULIN LISPRO 2 UNITS: 100 INJECTION, SOLUTION INTRAVENOUS; SUBCUTANEOUS at 09:17

## 2024-03-26 RX ADMIN — LEVETIRACETAM 500 MG: 500 TABLET, FILM COATED ORAL at 12:30

## 2024-03-26 RX ADMIN — MYCOPHENOLATE MOFETIL 1000 MG: 250 CAPSULE ORAL at 09:16

## 2024-03-26 RX ADMIN — FOLIC ACID 1 MG: 1 TABLET ORAL at 09:16

## 2024-03-26 RX ADMIN — PANTOPRAZOLE SODIUM 40 MG: 40 TABLET, DELAYED RELEASE ORAL at 06:44

## 2024-03-26 RX ADMIN — APIXABAN 2.5 MG: 2.5 TABLET, FILM COATED ORAL at 09:15

## 2024-03-26 ASSESSMENT — COGNITIVE AND FUNCTIONAL STATUS - GENERAL
HELP NEEDED FOR BATHING: A LOT
TOILETING: A LOT
DRESSING REGULAR LOWER BODY CLOTHING: A LOT
MOVING FROM LYING ON BACK TO SITTING ON SIDE OF FLAT BED WITH BEDRAILS: A LOT
MOVING TO AND FROM BED TO CHAIR: A LOT
PERSONAL GROOMING: A LITTLE
CLIMB 3 TO 5 STEPS WITH RAILING: TOTAL
STANDING UP FROM CHAIR USING ARMS: A LOT
WALKING IN HOSPITAL ROOM: A LOT
TURNING FROM BACK TO SIDE WHILE IN FLAT BAD: A LITTLE
MOBILITY SCORE: 12
DRESSING REGULAR LOWER BODY CLOTHING: A LOT
STANDING UP FROM CHAIR USING ARMS: A LOT
TOILETING: A LOT
DRESSING REGULAR UPPER BODY CLOTHING: A LOT
DAILY ACTIVITIY SCORE: 15
MOBILITY SCORE: 11
MOVING TO AND FROM BED TO CHAIR: A LOT
CLIMB 3 TO 5 STEPS WITH RAILING: TOTAL
EATING MEALS: A LITTLE
EATING MEALS: A LITTLE
WALKING IN HOSPITAL ROOM: A LOT
DAILY ACTIVITIY SCORE: 13
HELP NEEDED FOR BATHING: A LOT
MOVING FROM LYING ON BACK TO SITTING ON SIDE OF FLAT BED WITH BEDRAILS: A LOT
TURNING FROM BACK TO SIDE WHILE IN FLAT BAD: A LOT
PERSONAL GROOMING: A LOT
DRESSING REGULAR UPPER BODY CLOTHING: A LITTLE

## 2024-03-26 ASSESSMENT — PAIN SCALES - GENERAL
PAINLEVEL_OUTOF10: 0 - NO PAIN

## 2024-03-26 ASSESSMENT — PAIN - FUNCTIONAL ASSESSMENT
PAIN_FUNCTIONAL_ASSESSMENT: 0-10

## 2024-03-26 ASSESSMENT — ACTIVITIES OF DAILY LIVING (ADL)
EFFECT OF PAIN ON DAILY ACTIVITIES: .
HOME_MANAGEMENT_TIME_ENTRY: 23

## 2024-03-26 NOTE — PROGRESS NOTES
Occupational Therapy    OT Treatment    Patient Name: Bing Holliday  MRN: [id number]  Today's Date: 3/26/2024  Time Calculation  Start Time: 1245  Stop Time: 1308  Time Calculation (min): 23 min         Assessment:  Pt in bed upon arrival, very pleasant and agreeable to tx. Pt's lunch arrived, setup with tray and eating meal up in chair at end of tx.  End of Session Communication: Bedside nurse, PCT/NA/CTA  End of Session Patient Position: Up in chair, Alarm on       Plan:  OT Frequency: 3 times per week  OT Discharge Recommendations: Moderate intensity level of continued care     Subjective      03/26/24 1245   OT Last Visit   OT Received On 03/26/24   General   Prior to Session Communication Bedside nurse   Patient Position Received Bed, 3 rail up;Alarm on   General Comment Pt agreeable to tx and cleared for participation   Precautions   Medical Precautions Fall precautions   Pain Assessment   Pain Assessment 0-10   Pain Score 0 - No pain   Cognition   Overall Cognitive Status WFL   Grooming   Grooming Level of Assistance Minimum assistance   Grooming Where Assessed Standing sinkside   Grooming Comments Pt completed hand washing task standing at fww level at sink with chair behind pt for safety, Min A for safe standing balance, Min A to manage soap dispenser.   LE Dressing   LE Dressing Yes   Sock Level of Assistance Close supervision   Shoe Level of Assistance Close supervision   LE Dressing Where Assessed Edge of bed;Bed level   LE Dressing Comments Pt doffed socks to from bed level with figure four technique, increased time and effort. Pt doned slip on shoes while seated EOB with setup and SBA.   Toileting   Toileting Comments Pt declined toileting needs at this time.   Bed Mobility 1   Bed Mobility 1 Supine to sitting   Level of Assistance 1 Minimum assistance;Minimal verbal cues   Bed Mobility Comments 1 HOB elevated, increased time   Functional Mobility   Functional Mobility Performed Yes   Functional  Mobility 1   Assistance 1 Minimum assistance  (x2)   Comments 1 Pt ambulated short distances in room at fww level with Min A x2, cues for pacing, chair follow for safety.   Transfer 1   Technique 1 Sit to stand;Stand to sit   Transfer Device 1 Gait belt;Walker   Transfer Level of Assistance 1 Minimum assistance;Moderate assistance;+2;Minimal verbal cues   Trials/Comments 1 STS functional transfers completed at various surfaces- from elevated EOB with Min A x2, from recliner level first trial Mod A x2 as pt with posterior LOB and returned to sitting, second trial improved with anterior weight shifting Min A x2   IP OT Assessment   End of Session Communication Bedside nurse;PCT/NA/CTA   End of Session Patient Position Up in chair;Alarm on   Inpatient Plan   OT Frequency 3 times per week   OT Discharge Recommendations Moderate intensity level of continued care     Outcome Measures:[facility] Daily Activity  Putting on and taking off regular lower body clothing: A lot  Bathing (including washing, rinsing, drying): A lot  Putting on and taking off regular upper body clothing: A little  Toileting, which includes using toilet, bedpan or urinal: A lot  Taking care of personal grooming such as brushing teeth: A little  Eating Meals: A little  Daily Activity - Total Score: 15  Education Documentation  No documentation found.  Education Comments  No comments found.            Goals:  Encounter Problems       Encounter Problems (Active)       OT Goals       OT Goal 1 (Progressing)       Start:  03/22/24    Expected End:  03/26/24       Pt will complete all bed mobility with supervision safely            OT Goal 2 (Progressing)       Start:  03/22/24    Expected End:  03/26/24       Pt with complete all transfers safely with Min A safely           OT Goal 3 (Progressing)       Start:  03/22/24    Expected End:  03/26/24       Pt will complete ADL's and mobility with good sit balance and fair+ stand balance            OT Goal 4  (Progressing)       Start:  03/22/24    Expected End:  03/26/24       Pt will complete UB dressing ADL's with SBA using adaptive device(s) as needed           OT Goal 5 (Progressing)       Start:  03/22/24    Expected End:  03/26/24       Pt with complete grooming ADL's with supervision after setup

## 2024-03-26 NOTE — PROGRESS NOTES
Nutrition Follow Up Assessment:   Nutrition Assessment         Patient is a 63 y.o. female presenting with generalized weakness. Endocrinology following. Pt remains NPO since admit since she is not participating with SLP regina. She is also not participating with PT. Discharge plans pending SNF and not to return home.    3/26/24: follow up note. Endocrinology on consult. Plan for discharge later today to SNF. SLP consult 3/22 with recommendation for regular solids and thin liquids.      Per meal documentation: 3/25 100%B,L and D; 3/24 33%B, 100%D.     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 Since her admit, she has only been moaning and not really responding to staff. Today, she opened her eyes, moved her head back and forth and then closed them. Nursing reported this has been her baseline since admit - she didn't take any meds today. She doesn't seem interested in food or fluids and has not had any since admit. SLP has attempted daily to assess her swallow function, yet she will not participate. Nursing suggested calling son who is very invloved in her care. Spoke to son on the phone today and he reports pt was eating pretty normally up until the day before she was admitted. She had her usual cream of wheat for bfast, fruit and a sandwich for lunch and a ground turkey meal for dinner. His sister is next to him while he confirms with her and continues to report that the day of admit, she did seem more weak and was not eating as much.  Vitamin/Herbal Supplement Use: Folic Acid, MVI, Thiamine - 100mg daily  Food Allergies/Intolerances:  None  GI Symptoms: None last BM 3/25  Oral  "Problems: None regular solids and thin liquids per 3/22 ST eval.     Pain Score: 0 - No pain     Anthropometrics:  Height: 167.6 cm (5' 5.98\")   Weight: 96.5 kg (212 lb 11.9 oz)   BMI (Calculated): 34.35  IBW/kg (Dietitian Calculated): 58.93 kg  Percent of IBW: 163.75 %       Weight History:   Wt Readings from Last 10 Encounters:   01/17/24 99.3 kg (218 lb 14.7 oz)   01/17/24 96 kg (211 lb 10.3 oz)   12/26/23 93 kg (205 lb)   12/18/23 93.4 kg (205 lb 14.6 oz)   11/30/23 105 kg (231 lb)   11/20/23 94.8 kg (208 lb 15.9 oz)   10/19/23 94.6 kg (208 lb 8.9 oz)   09/14/23 94.3 kg (207 lb 12.8 oz)   08/01/23 95.3 kg (210 lb)   07/07/23 98.4 kg (217 lb)       Weight Change %:  Weight History / % Weight Change: Wt Readings from Last 10 Encounters:  01/17/24  99.3 kg (218 lb 14.7 oz)  01/17/24  96 kg (211 lb 10.3 oz)  12/26/23  93 kg (205 lb)  12/18/23  93.4 kg (205 lb 14.6 oz)  11/30/23  105 kg (231 lb)  11/20/23  94.8 kg (208 lb 15.9 oz)  10/19/23  94.6 kg (208 lb 8.9 oz)  09/14/23  94.3 kg (207 lb 12.8 oz)  08/01/23  95.3 kg (210 lb)  07/07/23  98.4 kg (217 lb)  Significant Weight Loss: No    Nutrition Focused Physical Exam Findings:    Subcutaneous Fat Loss:   Buccal Fat Pads: Well nourished (full, rounded cheeks)  Triceps: Well nourished (ample fat tissue)  Ribs: Defer  Muscle Wasting:  Temporalis: Well nourished (well-defined muscle)  Pectoralis (Clavicular Region): Well nourished (clavicle not visible)  Deltoid/Trapezius: Well nourished (rounded appearance at arm, shoulder, neck)  Interosseous: Defer  Trapezius/Infraspinatus/Supraspinatus (Scapular Region): Defer  Quadriceps: Defer  Gastrocnemius: Defer  Edema:  Edema: +2 mild  Edema Location: BLE  Physical Findings:  Skin: Positive (Dry; Flaky; stage 2 pressure injury to buttocks)    Nutrition Significant Labs:  CBC Trend:   Results from last 7 days   Lab Units 03/26/24  0650 03/25/24  0610 03/24/24  0631 03/23/24  0740   WBC AUTO x10*3/uL 4.7 3.9* 4.9 4.6   RBC AUTO " x10*6/uL 2.89* 3.01* 3.02* 2.88*   HEMOGLOBIN g/dL 8.7* 9.0* 9.1* 8.5*   HEMATOCRIT % 29.5* 32.8* 31.9* 29.9*   MCV fL 102* 109* 106* 104*   PLATELETS AUTO x10*3/uL 83* 82* 96* 98*    , BMP Trend:   Results from last 7 days   Lab Units 03/26/24  0650 03/25/24  0610 03/24/24  1831 03/24/24  0631   GLUCOSE mg/dL 231* 114* 330* 90   CALCIUM mg/dL 8.1* 7.8* 7.9* 8.1*   SODIUM mmol/L 142 143 138 148*   POTASSIUM mmol/L 4.2 4.2 5.0 3.6   CO2 mmol/L 28 23 25 27   CHLORIDE mmol/L 109* 110* 107 112*   BUN mg/dL 23 24* 25* 24*   CREATININE mg/dL 1.60* 1.54* 1.56* 1.48*    , A1C:  Lab Results   Component Value Date    HGBA1C 8.0 (H) 02/08/2024   , BG POCT trend:   Results from last 7 days   Lab Units 03/26/24  1155 03/26/24  0740 03/25/24  2137 03/25/24  1726 03/25/24  1158   POCT GLUCOSE mg/dL 163* 199* 306* 293* 336*    , Folate:   Lab Results   Component Value Date    FOLATE >24.0 01/16/2024    , CF Vitamin Labs:   Lab Results   Component Value Date    VITD25 34 12/10/2022        Nutrition Specific Medications:  Reviewed    I/O:   Last BM Date: 03/25/24; Stool Appearance: Loose (03/25/24 1600)    Dietary Orders (From admission, onward)       Start     Ordered    03/26/24 1101  Oral nutritional supplements  Until discontinued        Comments: vanilla   Question Answer Comment   Deliver with All meals    Select supplement: Glucerna Shake        03/26/24 1100    03/23/24 1559  Adult diet Carb Controlled; 60 gram carb/meal, 30 gram Carb evening snack  Diet effective now        Question Answer Comment   Diet type Carb Controlled    Carb diet selection: 60 gram carb/meal, 30 gram Carb evening snack        03/23/24 1558                     Estimated Needs:   Total Energy Estimated Needs (kCal): 1849 kCal  Method for Estimating Needs: MSJ=1541 x 1.2 x 1.0  Total Protein Estimated Needs (g):  (78-85g)  Method for Estimating Needs: 1.2-1.3 x 65kg  Total Fluid Estimated Needs (mL):  (1mL/kcal/d or as per physician)  Method for  Estimating Needs: 1mL/kcal/day        Nutrition Diagnosis   Malnutrition Diagnosis  Patient has Malnutrition Diagnosis: No    Nutrition Diagnosis  Patient has Nutrition Diagnosis: Yes  Diagnosis Status (1): Resolved  Nutrition Diagnosis 1: Inadequate oral intake  Related to (1): altered mental status  As Evidenced by (1): refusing to participate in swallow eval and remains NPO on day 3  Additional Nutrition Diagnosis: Diagnosis 2  Diagnosis Status (2): Ongoing  Nutrition Diagnosis 2: Increased nutrient needs  Related to (2): increased metabolic demand  As Evidenced by (2): need for healing/recovery       Nutrition Interventions/Recommendations         Nutrition Prescription:  Individualized Nutrition Prescription Provided for : Diet: continue with Adult diet carb controlled; 60g carb/meal, 30g carb/evening snack as ordered.        Nutrition Interventions:   Interventions: Meals and snacks, Vitamin supplement therapy, Medical food supplement  Meals and Snacks: Carbohydrate-modified diet  Goal: consume 75% of meals.  Medical Food Supplement: Commercial beverage  Goal: will provide Glucerna shakes TID w/meals (220kcal, 10g pro per 8oz serving)  Vitamin Supplement Therapy: Thiamin  Goal: continue 100mg/day         Nutrition Education:   3/26: discussed ONS and differences between brands/varieties. Pt drinks Original Ensure at home, but not daily. Pt prefers vanilla flavored supplements and will save servings for a later snack. Encouraged stable foods intakes to keep BG stable. Encouraged hydration in setting of admission hypernatremia.       Nutrition Monitoring and Evaluation   Food/Nutrient Related History Monitoring  Monitoring and Evaluation Plan: Energy intake  Energy Intake: Estimated energy intake  Criteria: will meet 75% of estimated energy needs w/meals and ONS.    Body Composition/Growth/Weight History  Monitoring and Evaluation Plan: Weight change  Weight Change: Weight change intent  Criteria: maintain  wt    Biochemical Data, Medical Tests and Procedures  Monitoring and Evaluation Plan: Electrolyte/renal panel, Glucose/endocrine profile  Electrolyte and Renal Panel: BUN, Sodium, Potassium, Phosphorus, Magnesium, Creatinine  Criteria: WNR  Glucose/Endocrine Profile: Glucose, casual  Criteria: goal of 70-180mg/dL.    Nutrition Focused Physical Findings  Monitoring and Evaluation Plan: Digestive System  Digestive System: Decrease in appetite  Criteria: appetite to improve       Time Spent/Follow-up Reminder:   Time Spent (min): 45 minutes  Last Date of Nutrition Visit: 03/26/24  Nutrition Follow-Up Needed?: 3-8 days  Follow up Comment: RAMON

## 2024-03-26 NOTE — PROGRESS NOTES
"    Endocrinology Inpatient Consult Progress Note     PATIENT NAME: Bing Holliday  MRN: [id number]  DATE: 3/26/2024    CONSULTING PHYSICIAN: Dr. Haywood  REASON FOR CONSULT: AI. T2DM. Hypoglycemia.      Interval Events     No acute events overnight.  Patient was eating breakfast this morning.  She states that she feels good.  \"When I go home it all goes to heck\".  She is anxious to leave the hospital and is wondering if her symptoms will recur.  Has not been ambulating.      Physical Examination     /78 (BP Location: Right arm, Patient Position: Lying)   Pulse 62   Temp 36.2 °C (97.2 °F) (Temporal)   Resp 20   Ht 1.676 m (5' 5.98\")   Wt 96.5 kg (212 lb 11.9 oz)   LMP  (LMP Unknown)   SpO2 95%   BMI 34.35 kg/m²   No acute distress.  Appropriate affect.  Frontal alopecia.  Regular rate and rhythm.  Nonlabored aspiration.  Soft nontender nondistended abdomen.  No pedal edema bilaterally.      Medications     Reviewed MAR       Data     Recent Labs and Imaging Reviewed      Assessment / Plan        # Adrenal Insufficiency  Vitals have been stable.  Maintained on prednisone 20 mg daily.  Continue the same.    # Hypoglycemia  None in the last 24 hours.  My question has always been if this is true hypoglycemia.  The patient is asymptomatic with the sugars per her.  Furthermore she feels no different after receiving carbohydrate heavy snacks.  She does not fulfill Whipple's triad.     # Uncontrolled Type 2 Diabetes Mellitus  Home Regimen: None.  Hemoglobin A1c (7/23): 6.9%  Nutrtion: Regular Diet.     As per problem #2.     # Osteoporosis  In the setting of chronic glucocorticoid use.  This will be further managed as an outpatient.  She probably would benefit from a bisphosphonate, these agents are best studied with steroid-induced adynamic bone disease.         Avi Sloan, DO  Endocrinology, Diabetes, and Metabolism    Available via [employer] Messenger    Please excuse any typographical or unwanted errors " within this documentation as voice recognition software was used to dictate this note.

## 2024-03-26 NOTE — PROGRESS NOTES
"   03/26/24 0903   Discharge Planning   Patient expects to be discharged to: [facility]     Message sent to direct pre cert to check status of pre cert.     1250: [facility] and [facility] to [facility]. Stretcher Round trip transport requested for 1400. Stretcher for \"flight risk, confusion, hallucinations.    1405: Stretcher transport arranged for 1500 today. Facility, nursing, and son notified of transport. Message to dsc to submit the 7000.   " 2024 19:24

## 2024-03-26 NOTE — SIGNIFICANT EVENT
Overnight at 1904 nursing staff messaged resident night team stating that patient would like to be full code instead of her current DNR-CCA, DNI.  I went to patient's bedside and performed a capacity evaluation on her.  Patient knows her name, age, date of birth, month, year, president, time of day, and why she is in the hospital.  Patient is able to understand her medical problems.  She is able to understand the proposed treatments.  She is able to understand the alternative to proposed treatment.  She is able to understand option of refusing proposed treatment.  She is able to appreciate reasonably foreseeable consequences of accepting proposed treatment.  She is able to appreciate reasonably foreseeable consequences of refusing proposed treatment.  Her decision does not seem to be affected by depression.  Her decision does not seem to be affected by psychosis.  We discussed at length the difference between full code, DNR-CCA, DNI, DNR-CC.  Patient understands the different nuances.  She is adamant that she would like to be full code.  I do feel that the patient has full capacity at this time.  CODE STATUS will be changed at this time to full code.  Patient should be reevaluated in the morning by day team to ensure she still would like to be full code.    Lauren Valdez DO  Family Medicine Resident, PGY-3  [facility]

## 2024-03-26 NOTE — DISCHARGE SUMMARY
Discharge Diagnosis  Generalized weakness    Issues Requiring Follow-Up  # Adrenal insufficiency  -Continue prednisone 20 mg daily, follow-up with endocrinology Dr. Sloan outpatient  -Currently patient has been on this dose of steroids for about 2 weeks.  Will need to consider initiation of PJP prophylaxis with Bactrim every other day as an outpatient    # DM2  -Discontinue basal Lantus due to tendency for hypoglycemia  -Continue sliding scale as needed    # Hypertension  -Discontinue clonidine, continue nifedipine 60 mg daily    Discharge Meds     Your medication list        START taking these medications        Instructions Last Dose Given Next Dose Due   NIFEdipine ER 60 mg 24 hr tablet  Commonly known as: Adalat CC  Start taking on: March 27, 2024      Take 1 tablet (60 mg) by mouth once daily in the morning. Take before meals. Do not crush, chew, or split. Do not start before March 27, 2024.              CONTINUE taking these medications        Instructions Last Dose Given Next Dose Due   albuterol 90 mcg/actuation inhaler           apixaban 2.5 mg tablet  Commonly known as: Eliquis      Take 1 tablet (2.5 mg) by mouth 2 times a day.       atorvastatin 40 mg tablet  Commonly known as: Lipitor      Take 1 tablet (40 mg) by mouth once daily.       Dexcom G6  misc  Generic drug: blood-glucose meter,continuous      Use as instructed       Dexcom G6 Sensor device  Generic drug: blood-glucose sensor      Use to check sugars 3 times daily       Dexcom G6 Transmitter device  Generic drug: blood-glucose transmitter device      Use as instructed       folic acid 1 mg tablet  Commonly known as: Folvite      TAKE 1 TABLET BY MOUTH EVERY DAY       insulin lispro 100 unit/mL injection  Commonly known as: Admelog SoloStar U-100 Insulin      Take as directed per insulin instructions. Sliding scale: 0-150 = 0; 151-200 = 2; 201-250 = 4; 251-300=6; 301-350=8; 351-400= 10; greater than 400, call MD, subcutaneously before  "meals for DM.       levETIRAcetam 500 mg tablet  Commonly known as: Keppra      Take 1 tablet (500 mg) by mouth every 12 hours.       melatonin 5 mg tablet           multivitamin tablet           mycophenolate 500 mg tablet  Commonly known as: Cellcept      TAKE 2 TABLETS BY MOUTH TWICE A DAY       pantoprazole 40 mg EC tablet  Commonly known as: ProtoNix      TAKE 1 TABLET BY MOUTH EVERY DAY       pen needle, diabetic 31 gauge x 5/16\" needle      Use to inject 1-4 times daily as directed.       predniSONE 20 mg tablet  Commonly known as: Deltasone      Take 1 tablet (20 mg) by mouth once daily.       risperiDONE 0.5 mg disintegrating tablet  Commonly known as: RisperDAL M-TAB      Take 1 tablet (0.5 mg) by mouth once daily at bedtime.       thiamine 100 mg tablet  Commonly known as: Vitamin B-1           Trelegy Ellipta 200-62.5-25 mcg blister with device  Generic drug: fluticasone-umeclidin-vilanter      Inhale 1 puff once daily.              STOP taking these medications      amLODIPine 2.5 mg tablet  Commonly known as: Norvasc        Lantus Solostar U-100 Insulin 100 unit/mL (3 mL) pen  Generic drug: insulin glargine        loperamide 2 mg capsule  Commonly known as: Imodium                  Where to Get Your Medications        These medications were sent to [facility]/pharmacy #[id number] - [place], [place] - [address] [address]  [address] [facility] [address]      Phone: [phone]   NIFEdipine ER 60 mg 24 hr tablet         Test Results Pending At Discharge  Pending Labs       No current pending labs.            Hospital Course  Bing Holliday is a 63 y.o. 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  Upon arrival to the emergency department patient had SIRS criteria with hypothermia and " tachypnea and was corrected overnight with the use of a Allan hugger.  Patient was not responsive to questions and not cooperative and hyper analgesic exam.  Due to her adrenal insufficiency and being on chronic prednisone 20 mg, stress dose steroids were initiated with Solu-Cortef, and endocrinology was consulted.  The next day, patient's mentation remarkably improved and hypothermia resolved.  Extensive infectious workup was performed, including blood cultures and CT chest abdomen/pelvis, which was negative for any infectious etiology for hypothermia.  MRI brain with and without contrast was also obtained to rule out any possible encephalitis or lupus cerebritis, which was also negative.  As patient's mentation returned back to baseline, she evaluated by PT/OT and recommended SNF placement.  Was noted that throughout hospitalization she was slightly hypertensive, and her antihypertensives were switched from amlodipine to nifedipine 60 mg daily.  Endocrinology recommends continuing prednisone 20 mg daily, and to discontinue her basal Lantus due to intermittent episodes of hypoglycemia.  Medically clear for discharge at this time to SNF.      Pertinent Physical Exam At Time of Discharge  Constitutional: Fatigued, alert and oriented x 4, in no acute distress  Skin: Warm and dry, no lesions, no rashes  Eyes: Anicteric, clear sclera  ENMT: mucous membranes moist, no apparent injury, no lesions seen  Head/Neck: Atraumatic  Respiratory: Lungs clear to auscultation bilaterally with no wheezes, rhonci or crackles  CV: Regular rate and rhythm, no murmurs or gallops auscultated  GI: Non-tender to deep palpation, no guarding  MSK: no joint swelling  Extremities: Minimal pitting edema bilateral lower extremities  Psych: Appropriate mood and behavior     Outpatient Follow-Up  Future Appointments   Date Time Provider Department Center   3/28/2024  1:30 PM Claudio Kearney MD [id number] [place]   8/13/2024 10:30 AM Michael SANDOVAL  MD Dag[place]to [id number] [place]         López Pyle DO

## 2024-03-26 NOTE — CARE PLAN
The patient's goals for the shift include      The clinical goals for the shift include remain free from hallucinations      Problem: Safety  Goal: Patient will be injury free during hospitalization  Outcome: Progressing  Goal: I will remain free of falls  Outcome: Progressing     Problem: Daily Care  Goal: Daily care needs are met  Outcome: Progressing     Problem: Psychosocial Needs  Goal: Demonstrates ability to cope with hospitalization/illness  Outcome: Progressing  Goal: Collaborate with me, my family, and caregiver to identify my specific goals  Outcome: Progressing     Problem: Discharge Barriers  Goal: My discharge needs are met  Outcome: Progressing     Problem: Skin  Goal: Decreased wound size/increased tissue granulation at next dressing change  Outcome: Progressing  Flowsheets (Taken 3/26/2024 0348)  Decreased wound size/increased tissue granulation at next dressing change: Promote sleep for wound healing  Goal: Participates in plan/prevention/treatment measures  Outcome: Progressing  Flowsheets (Taken 3/26/2024 0348)  Participates in plan/prevention/treatment measures: Elevate heels  Goal: Prevent/manage excess moisture  Outcome: Progressing  Flowsheets (Taken 3/26/2024 0348)  Prevent/manage excess moisture:   Cleanse incontinence/protect with barrier cream   Moisturize dry skin  Goal: Prevent/minimize sheer/friction injuries  Outcome: Progressing  Flowsheets (Taken 3/26/2024 0348)  Prevent/minimize sheer/friction injuries:   Use pull sheet   Turn/reposition every 2 hours/use positioning/transfer devices  Goal: Promote/optimize nutrition  Outcome: Progressing  Goal: Promote skin healing  Outcome: Progressing  Flowsheets (Taken 3/26/2024 0348)  Promote skin healing:   Assess skin/pad under line(s)/device(s)   Turn/reposition every 2 hours/use positioning/transfer devices   Rotate device position/do not position patient on device     Problem: Pain - Adult  Goal: Verbalizes/displays adequate comfort level  or baseline comfort level  Outcome: Progressing     Problem: Safety - Adult  Goal: Free from fall injury  Outcome: Progressing     Problem: Discharge Planning  Goal: Discharge to home or other facility with appropriate resources  Outcome: Progressing     Problem: Chronic Conditions and Co-morbidities  Goal: Patient's chronic conditions and co-morbidity symptoms are monitored and maintained or improved  Outcome: Progressing     Problem: Diabetes  Goal: Achieve decreasing blood glucose levels by end of shift  Outcome: Progressing  Goal: Increase stability of blood glucose readings by end of shift  Outcome: Progressing  Goal: Decrease in ketones present in urine by end of shift  Outcome: Progressing  Goal: Maintain electrolyte levels within acceptable range throughout shift  Outcome: Progressing  Goal: Maintain glucose levels >70mg/dl to <250mg/dl throughout shift  Outcome: Progressing  Goal: No changes in neurological exam by end of shift  Outcome: Progressing  Goal: Learn about and adhere to nutrition recommendations by end of shift  Outcome: Progressing  Goal: Vital signs within normal range for age by end of shift  Outcome: Progressing  Goal: Increase self care and/or family involovement by end of shift  Outcome: Progressing  Goal: Receive DSME education by end of shift  Outcome: Progressing

## 2024-03-26 NOTE — PROGRESS NOTES
Physical Therapy    Physical Therapy    Physical Therapy Treatment    Patient Name: Bing Holliday  MRN: [id number]  Today's Date: 3/26/2024  Time Calculation  Start Time: 1244  Stop Time: 1307  Time Calculation (min): 23 min        03/26/24 1244   PT  Visit   PT Received On 03/26/24   Response to Previous Treatment Patient with no complaints from previous session.   General   Past Medical History Relevant to Rehab SLE, Jaccoud's arthopathy, adrenal insuff, CKD, GERD, CAD s/p CABG, h/o AAA   Prior to Session Communication Bedside nurse   Patient Position Received Bed, 3 rail up;Alarm on   General Comment Pt agreeable to therapy and cleared for participation   Precautions   Medical Precautions Fall precautions   Pain Assessment   Pain Assessment 0-10   Pain Score 0 - No pain   Effect of Pain on Daily Activities .   Cognition   Overall Cognitive Status WFL   Bed Mobility   Bed Mobility Yes   Bed Mobility 1   Bed Mobility 1 Supine to sitting   Level of Assistance 1 Minimum assistance;Minimal verbal cues  (x2)   Bed Mobility Comments 1 Increased time and effort to complete   Ambulation/Gait Training   Ambulation/Gait Training Performed Yes   Ambulation/Gait Training 1   Surface 1 Level tile   Device 1 Rolling walker   Gait Support Devices Gait belt   Assistance 1 Minimum assistance;Moderate assistance;Minimal verbal cues  (x2)   Quality of Gait 1 Diminished heel strike;NBOS;Decreased step length   Comments/Distance (ft) 1 5', 10', 12' -decreased aaron, mildly unsteady requiring modAx2 progressing to minAx2, chair follow for safety.   Transfers   Transfer Yes   Transfer 1   Transfer From 1 Bed to   Transfer to 1 Chair with arms   Technique 1 Sit to stand;Stand to sit   Transfer Device 1 Walker;Gait belt   Transfer Level of Assistance 1 Moderate assistance;Minimum assistance;Moderate verbal cues;+2   Trials/Comments 1 Multiple transfers performed, minAx2 from EOB, modAx2  progressing to minAx2 from chair with arms. Mod  cues given and good performance of scooting hips toward EOB/EOC.   Other Activity   Other Activity Performed Yes   Other Activity 1 standing activity at sink, minAx2 with chair behind pt for increased safety. Setaed rest break as needed between activities.   Activity Tolerance   Endurance Tolerates 10 - 20 min exercise with multiple rests   PT Assessment   PT Assessment Results Decreased strength;Decreased endurance;Impaired balance;Decreased mobility   Rehab Prognosis Good   End of Session Communication Bedside nurse   Assessment Comment Pt is pleasant and puts forth good effort this session. Min/modAx2 for all activities, chair follow for safety and decreased activity tolerance. B hands/feet with arthritic deformities.   End of Session Patient Position Up in chair       Outcome Measures:  [facility] Basic Mobility  Turning from your back to your side while in a flat bed without using bedrails: A lot  Moving from lying on your back to sitting on the side of a flat bed without using bedrails: A lot  Moving to and from bed to chair (including a wheelchair): A lot  Standing up from a chair using your arms (e.g. wheelchair or bedside chair): A lot  To walk in hospital room: A lot  Climbing 3-5 steps with railing: Total  Basic Mobility - Total Score: 11                             EDUCATION:  Outpatient Education  Individual(s) Educated: Patient  Education Provided: Fall Risk  Education Documentation  Body Mechanics, taught by Lora Koroma PTA at 3/26/2024  1:24 PM.  Learner: Patient  Readiness: Acceptance  Method: Explanation  Response: Verbalizes Understanding, Demonstrated Understanding    Precautions, taught by Lora Koroma PTA at 3/26/2024  1:24 PM.  Learner: Patient  Readiness: Acceptance  Method: Explanation  Response: Verbalizes Understanding, Demonstrated Understanding    Home Exercise Program, taught by Lora Koroma PTA at 3/26/2024  1:24 PM.  Learner: Patient  Readiness: Acceptance  Method:  Explanation  Response: Verbalizes Understanding, Demonstrated Understanding    Body Mechanics, taught by Lora Koroma PTA at 3/26/2024  1:24 PM.  Learner: Patient  Readiness: Acceptance  Method: Explanation  Response: Verbalizes Understanding, Demonstrated Understanding    Mobility Training, taught by Lora Koroma PTA at 3/26/2024  1:24 PM.  Learner: Patient  Readiness: Acceptance  Method: Explanation  Response: Verbalizes Understanding, Demonstrated Understanding    Education Comments  No comments found.        GOALS:  Encounter Problems       Encounter Problems (Active)       PT Problem       Pt will be able to perform all bed mobility tasks with Mod I.  (Progressing)       Start:  03/22/24    Expected End:  03/26/24            Pt will perform all transfers with Min A and FWW with proper safety mechanics.   (Progressing)       Start:  03/22/24    Expected End:  03/26/24            Pt will ambulate 10 ft with Min A using FWW for improved functional independence.  (Progressing)       Start:  03/22/24    Expected End:  03/26/24            Pt will be able to negotiate 3 steps with 1 HR with Min-Mod A.  (Progressing)       Start:  03/22/24    Expected End:  03/26/24               Pain - Adult

## 2024-03-26 NOTE — PROGRESS NOTES
Bing Holliday is a 63 y.o. female on day 5 of admission presenting with Generalized weakness.      Subjective   Overnight patient wished to change her code status to FULL CODE. She expressed understanding of her medical condition and overall condition. Her code status was changed to FULL CODE.       Objective     Last Recorded Vitals  /68 (BP Location: Left arm, Patient Position: Lying)   Pulse 88   Temp 36 °C (96.8 °F) (Temporal)   Resp 16   Wt 96.5 kg (212 lb 11.9 oz)   SpO2 100%   Intake/Output last 3 Shifts:    Intake/Output Summary (Last 24 hours) at 3/26/2024 1126  Last data filed at 3/25/2024 2200  Gross per 24 hour   Intake 480 ml   Output 2100 ml   Net -1620 ml         Admission Weight  Weight: 96.5 kg (212 lb 11.9 oz) (03/19/24 2221)    Daily Weight  03/19/24 : 96.5 kg (212 lb 11.9 oz)      Scheduled medications  apixaban, 2.5 mg, oral, BID  atorvastatin, 40 mg, oral, Daily  folic acid, 1 mg, oral, Daily  insulin lispro, 0-10 Units, subcutaneous, TID with meals  levETIRAcetam, 500 mg, oral, q12h  mycophenolate, 1,000 mg, oral, BID  NIFEdipine ER, 60 mg, oral, Daily before breakfast  nystatin, 1 Application, Topical, BID  pantoprazole, 40 mg, oral, Daily before breakfast  predniSONE, 20 mg, oral, Daily  thiamine, 100 mg, oral, Daily      Continuous medications     PRN medications  PRN medications: dextrose, dextrose, diclofenac sodium, glucagon, ipratropium-albuteroL, melatonin, risperiDONE     Physical Exam  Constitutional: Fatigued, alert and oriented x 4, in no acute distress  Skin: Warm and dry, no lesions, no rashes  Eyes: Anicteric, clear sclera  ENMT: mucous membranes moist, no apparent injury, no lesions seen  Head/Neck: Atraumatic  Respiratory: Lungs clear to auscultation bilaterally with no wheezes, rhonci or crackles  CV: Regular rate and rhythm, no murmurs or gallops auscultated  GI: Non-tender to deep palpation, no guarding  MSK: no joint swelling  Extremities: Minimal pitting  edema bilateral lower extremities  Psych: Appropriate mood and behavior     Relevant Results  Results for orders placed or performed during the hospital encounter of 03/19/24 (from the past 24 hour(s))   POCT GLUCOSE   Result Value Ref Range    POCT Glucose 336 (H) 74 - 99 mg/dL   POCT GLUCOSE   Result Value Ref Range    POCT Glucose 293 (H) 74 - 99 mg/dL   POCT GLUCOSE   Result Value Ref Range    POCT Glucose 306 (H) 74 - 99 mg/dL   Renal function panel   Result Value Ref Range    Glucose 231 (H) 74 - 99 mg/dL    Sodium 142 136 - 145 mmol/L    Potassium 4.2 3.5 - 5.3 mmol/L    Chloride 109 (H) 98 - 107 mmol/L    Bicarbonate 28 21 - 32 mmol/L    Anion Gap 9 (L) 10 - 20 mmol/L    Urea Nitrogen 23 6 - 23 mg/dL    Creatinine 1.60 (H) 0.50 - 1.05 mg/dL    eGFR 36 (L) >60 mL/min/1.73m*2    Calcium 8.1 (L) 8.6 - 10.3 mg/dL    Phosphorus 2.4 (L) 2.5 - 4.9 mg/dL    Albumin 2.6 (L) 3.4 - 5.0 g/dL   Magnesium   Result Value Ref Range    Magnesium 1.51 (L) 1.60 - 2.40 mg/dL   CBC and Auto Differential   Result Value Ref Range    WBC 4.7 4.4 - 11.3 x10*3/uL    nRBC 2.4 (H) 0.0 - 0.0 /100 WBCs    RBC 2.89 (L) 4.00 - 5.20 x10*6/uL    Hemoglobin 8.7 (L) 12.0 - 16.0 g/dL    Hematocrit 29.5 (L) 36.0 - 46.0 %     (H) 80 - 100 fL    MCH 30.1 26.0 - 34.0 pg    MCHC 29.5 (L) 32.0 - 36.0 g/dL    RDW 19.5 (H) 11.5 - 14.5 %    Platelets 83 (L) 150 - 450 x10*3/uL    Immature Granulocytes %, Automated 5.4 (H) 0.0 - 0.9 %    Immature Granulocytes Absolute, Automated 0.25 0.00 - 0.70 x10*3/uL   Manual Differential   Result Value Ref Range    Neutrophils %, Manual 76.0 40.0 - 80.0 %    Bands %, Manual 1.0 0.0 - 5.0 %    Lymphocytes %, Manual 17.0 13.0 - 44.0 %    Monocytes %, Manual 2.0 2.0 - 10.0 %    Eosinophils %, Manual 0.0 0.0 - 6.0 %    Basophils %, Manual 0.0 0.0 - 2.0 %    Myelocytes %, Manual 4.0 0.0 - 0.0 %    Seg Neutrophils Absolute, Manual 3.57 1.20 - 7.00 x10*3/uL    Bands Absolute, Manual 0.05 0.00 - 0.70 x10*3/uL     Lymphocytes Absolute, Manual 0.80 (L) 1.20 - 4.80 x10*3/uL    Monocytes Absolute, Manual 0.09 (L) 0.10 - 1.00 x10*3/uL    Eosinophils Absolute, Manual 0.00 0.00 - 0.70 x10*3/uL    Basophils Absolute, Manual 0.00 0.00 - 0.10 x10*3/uL    Myelocytes Absolute, Manual 0.19 0.00 - 0.00 x10*3/uL    Total Cells Counted 100     Neutrophils Absolute, Manual 3.62 1.20 - 7.70 x10*3/uL    RBC Morphology See Below     Polychromasia Mild     Giant Platelets Few    POCT GLUCOSE   Result Value Ref Range    POCT Glucose 199 (H) 74 - 99 mg/dL        Image Results  MR brain w and wo IV contrast  Narrative: Interpreted By:  Jeri Smith,   STUDY:  MR BRAIN W AND WO IV CONTRAST;  3/20/2024 10:34 pm      INDICATION:  Signs/Symptoms:encephalopathy.      COMPARISON:  MRI of the brain dated 01/05/2024; same day noncontrast CT head      ACCESSION NUMBER(S):  [id number]      ORDERING CLINICIAN:  LISBET MAHER      TECHNIQUE:  Multiplanar and multisequence MRI of the brain was performed before  and after intravenous administration of 20 mL of Dotarem gadolinium  contrast.      FINDINGS:  Exam is degraded by motion.      No diffusion restriction abnormality is present to suggest an acute  infarct.      There is no imaging evidence to suggest acute intracranial  hemorrhage. No mass effect or midline shift is evident.      Hemosiderin staining is present within the area of cystic  encephalomalacia and gliosis in the left occipital lobe, likely  representing sequela of prior infarct/injury.      No ventricular dilatation is evident. Basal cisterns are patent. No  extra-axial fluid collections are identified.      Patchy and confluence areas of hyperintense FLAIR and T2 signal are  present in the periventricular and subcortical white matter of  bilateral cerebral hemispheres, nonspecific findings favored to  represent sequela of microvascular disease.      No pathologic intracranial enhancement is identified.      Visualized large  arterial flow voids, including intracranial carotid  and basilar artery appear preserved.      Mild mucosal thickening is present in the inferior maxillary sinuses  bilaterally. No abnormal fluid signal is present in the mastoid air  cells bilaterally.      Impression: 1. Within limitations of somewhat motion degraded study, no evidence  of new intracranial infarct, hemorrhage or pathologic enhancement.  2. Geographic area of cystic encephalomalacia and gliosis present in  the left occipital lobe likely represents chronic sequela of prior  infarct/injury, similar in appearance to prior exam.  3. Patchy and confluence areas of hyperintense FLAIR and T2 signal  are present in the periventricular and subcortical white matter of  bilateral cerebral hemispheres, nonspecific findings favored to  represent sequela of microvascular disease.      MACRO:  None      Signed by: Jeri Smith 3/20/2024 11:57 PM  Dictation workstation:   [id number]  CT chest abdomen pelvis wo IV contrast  Narrative: Interpreted By:  Richie Garcia,   STUDY:  CT CHEST ABDOMEN PELVIS WO CONTRAST;  3/20/2024 6:16 pm      INDICATION:  Signs/Symptoms:sepsis      COMPARISON:  CT of the chest, abdomen, and pelvis 01/14/2024      ACCESSION NUMBER(S):  [id number]      ORDERING CLINICIAN:  LISBET MAHER      TECHNIQUE:  CT of the chest, abdomen, and pelvis was performed.  Volumetric CT imaging through the chest, abdomen, and pelvis without  contrast.. Multiplanar reconstructions were processed on a separate  workstation.      FINDINGS:  CHEST:      Motion limited examination. Asymmetric mixed reticular and airspace  opacities of the right greater left central upper lobes and central  right and left lower lobes suggestive of either pneumonitis or  atypical pulmonary edema. No pneumothorax or effusion.      The thoracic aorta is unchanged with respect course, caliber contour.      The main pulmonary artery and its branches are unchanged with  respect  course, caliber, and contour.      Cardiomegaly with severe coronary atherosclerotic calcifications and  probable background of coronary stents. Relative low density of  intravascular volume with respect to the vessel wall suggestive of  anemia. Osseous structures appear stable with chronic appearing  compression irregularity of T7. Similar appearing background of  multilevel spondylosis. Minimal step-off of the mid sternum, presumed  secondary to motion artifact (series 500, image 72).      ABDOMEN:      Motion limited examination.      The nonenhanced liver, spleen, bilateral adrenal glands, pancreas,  and bilateral kidneys are grossly unchanged. No abnormal dilatation  of large or small bowel. Colonic diverticulosis. No obvious acute  diverticulitis is detected. The lower pelvis is suboptimally assessed  due to extensive beam hardening artifact from right hip arthroplasty.  The urinary bladder and lower pelvic organs are not adequately  assessed. There is significant wall thickening of the ascending colon  with minimal adjacent reticular changes. No evidence of abdominal  aortic aneurysm. Osseous structures appear stable. Chronic appearing  height loss of the L1 and L2 vertebral bodies. Severe multilevel  spondylosis.      Impression: CHEST  1.  Motion limited examination.  2. Relatively central mixed reticular and airspace opacities  suggestive either multifocal pneumonitis or atypical pulmonary edema.  3. Remaining intrathoracic findings appear stable since comparison CT  imaging 01/14/2024.      ABDOMEN - PELVIS  1.  Nonspecific wall thickening of the ascending colon suggestive of  a nonspecific colitis. Correlate for infectious, inflammatory, or  ischemic etiologies.  2. Colonic diverticulosis without evidence of acute diverticulitis.  3. Remaining findings appear stable since comparison CT imaging  01/14/2024.          MACRO:  None      Signed by: Richie Garcia 3/20/2024 8:02 PM  Dictation  workstation:   [id number]  ECG 12 lead  Sinus bradycardia  Voltage criteria for left ventricular hypertrophy  Nonspecific T wave abnormality  Abnormal ECG  When compared with ECG of 09-MAR-2024 18:13,  Vent. rate has decreased BY  36 BPM  Nonspecific T wave abnormality no longer evident in Anterior leads  ECG 12 lead  Sinus bradycardia  Voltage criteria for left ventricular hypertrophy  Nonspecific T wave abnormality  Abnormal ECG  When compared with ECG of 19-MAR-2024 22:44, (unconfirmed)  No significant change was found  CT head wo IV contrast  Narrative: Interpreted By:  Anselmo Chan,   STUDY:  CT HEAD WO IV CONTRAST;  3/20/2024 3:31 am      INDICATION:  Signs/Symptoms:AMS.      COMPARISON:  CT scan of the head 03/09/2024      ACCESSION NUMBER(S):  [id number]      ORDERING CLINICIAN:  GOMEZ CHAMBERS      TECHNIQUE:  Axial noncontrast CT images of the head.      FINDINGS:  BRAIN PARENCHYMA: Stable focal area of encephalomalacia in the left  occipital lobe likely sequela of remote infarct. Gray-white matter  interfaces are otherwise preserved. No mass, mass effect or midline  shift. Mild deep and periventricular white matter hypodensities are  nonspecific, but favored to represent chronic small vessel ischemic  changes. Calcifications bilateral basal ganglia are likely  physiologic.      HEMORRHAGE: No acute intracranial hemorrhage.  VENTRICLES and EXTRA-AXIAL SPACES: Mild volume loss with prominence  of the ventricles and sulci. EXTRACRANIAL SOFT TISSUES: Within normal  limits. PARANASAL SINUSES/MASTOIDS: The visualized paranasal sinuses  and mastoid air cells are aerated. CALVARIUM: No depressed skull  fracture. No destructive osseous lesion.      OTHER FINDINGS: Atherosclerotic calcification of the carotid siphons  and vertebral arteries..      Impression: No acute intracranial abnormality. If symptoms persist or there is  high clinical suspicion further evaluation with MRI can be considered.       Chronic changes as noted above.      MACRO:  None      Signed by: Anselmo Chan 3/20/2024 3:40 AM  Dictation workstation:   [id number]           Assessment/Plan   63-year-old female initially presenting for altered mental status and generalized weakness with nonspecific pain.  Significant history of SLE and acute arthropathy on CellCept, secondary adrenal insufficiency on prednisone, CKD stage III, A-fib on Eliquis, CAD s/p CABG.  Initially received stress dose steroids with improvement in mentation, CT C/A/P and MRI brain without any acute pathology.  Endocrinology evaluated the patient, and patient was resumed on her home steroid regimen of prednisone 20 mg daily.  Intermittently hypernatremic, received D5 administration.     # Hypernatremia, likely hypovolemic  # CKD  -Hypernatremia resolved after D5W administration, improving on morning labs. Will continue to monitor RFP. Educated patient on increasing her PO intake  -Creatinine levels at baseline ~1.8  -Dietician consulted for further dietary supplement reccomendations     # AMS, unclear etiology, resolved  -Infectious etiology ruled out, CT C/A/P negative  -dsDNA WNL, MRI brain without any acute pathology, low suspicion for lupus etiology  -Hallucinations, resumed home risperidone, improving     # Adrenal insufficiency, secondary  #DM2  -Off of stress dose steroids, resume home prednisone 20 mg daily  -Labile blood sugars, would hesitate to initiate basal insulin therapy given her tendency to be hypoglycemic. Continue sliding scale  -Possibly medication non-adherence contributing to presentation  -Endocrinology on consult, appreciate recommendations  -Will need to be started on PJP prophylaxis with bactrim every other day as an outpatient if she remains on >20mg prednisone daily for >4 weeks     # Hypertension  -Well controlled currently on nifedipine 60mg QD     # A-fib on Eliquis  -Resume home Eliquis     DVT prophylaxis: Therapeutic Eliquis  Diet:  Diabetic  Consults: Palliative care, endocrinology     Disposition: Continuing to monitor sodium levels and adjusting blood pressure regimen. Pending precertification to ONNR.         To be staffed with attending,  López Pyle, DO  Internal Medicine PGY-3

## 2024-03-28 ENCOUNTER — APPOINTMENT (OUTPATIENT)
Dept: NEUROSURGERY | Facility: CLINIC | Age: 64
End: 2024-03-28
Payer: MEDICARE

## 2024-03-29 DIAGNOSIS — R44.3 HALLUCINATION: ICD-10-CM

## 2024-03-29 NOTE — NURSING NOTE
0700: Assumed care of pt. At this time.    0745: Spoke with med student at the bedside about concerns with low BS still that have been ongoing since admission. Pt. States she does not feel good this AM. Juice given and message sent to team regarding low BS and the need for PRN order clarification. Will follow.    1700: Pt. Blood sugars have improved throughout this shift and only needed D50 one time. The pt. Sitting in the chair and was able to work with therapy and they report that she is a heavy 2 assist to get to the chair. Will follow.    1745: Pt. Resting in the chair at this time and denies any further needs. Pt. Denies any pain and has call light in reach. Will continue to monitor this pt. Throughout this shift.    The patient is a 76y Female complaining of

## 2024-03-30 RX ORDER — RISPERIDONE 0.5 MG/1
0.5 TABLET, ORALLY DISINTEGRATING ORAL NIGHTLY
Qty: 90 TABLET | Refills: 1 | OUTPATIENT
Start: 2024-03-30 | End: 2024-04-29

## 2024-03-31 LAB
ATRIAL RATE: 53 BPM
ATRIAL RATE: 58 BPM
P AXIS: 42 DEGREES
P AXIS: 55 DEGREES
P OFFSET: 193 MS
P OFFSET: 193 MS
P ONSET: 128 MS
P ONSET: 134 MS
PR INTERVAL: 146 MS
PR INTERVAL: 158 MS
Q ONSET: 207 MS
Q ONSET: 207 MS
QRS COUNT: 10 BEATS
QRS COUNT: 9 BEATS
QRS DURATION: 100 MS
QRS DURATION: 92 MS
QT INTERVAL: 446 MS
QT INTERVAL: 450 MS
QTC CALCULATION(BAZETT): 422 MS
QTC CALCULATION(BAZETT): 437 MS
QTC FREDERICIA: 432 MS
QTC FREDERICIA: 440 MS
R AXIS: -10 DEGREES
R AXIS: -11 DEGREES
T AXIS: -14 DEGREES
T AXIS: 23 DEGREES
T OFFSET: 430 MS
T OFFSET: 432 MS
VENTRICULAR RATE: 53 BPM
VENTRICULAR RATE: 58 BPM

## 2024-04-03 RX ORDER — RISPERIDONE 0.5 MG/1
0.5 TABLET, ORALLY DISINTEGRATING ORAL NIGHTLY
Qty: 90 TABLET | Refills: 0 | Status: SHIPPED | OUTPATIENT
Start: 2024-04-03 | End: 2024-04-30 | Stop reason: HOSPADM

## 2024-04-03 NOTE — TELEPHONE ENCOUNTER
Claudio Hood, DO   to Me       4/3/24  9:26 AM  Okay to fill this prescription as she has not seen her psychiatrist in a while because of her multiple hospital visits, please arrange

## 2024-04-05 ENCOUNTER — PATIENT OUTREACH (OUTPATIENT)
Dept: PRIMARY CARE | Facility: CLINIC | Age: 64
End: 2024-04-05
Payer: MEDICARE

## 2024-04-05 DIAGNOSIS — I27.20 PULMONARY HYPERTENSION (MULTI): ICD-10-CM

## 2024-04-05 DIAGNOSIS — E11.42 DM TYPE 2 WITH DIABETIC PERIPHERAL NEUROPATHY (MULTI): ICD-10-CM

## 2024-04-05 DIAGNOSIS — M32.9 LUPUS (MULTI): ICD-10-CM

## 2024-04-05 DIAGNOSIS — E27.40 ADRENAL INSUFFICIENCY (MULTI): ICD-10-CM

## 2024-04-05 DIAGNOSIS — R56.9 SEIZURE (MULTI): ICD-10-CM

## 2024-04-05 PROCEDURE — 99490 CHRNC CARE MGMT STAFF 1ST 20: CPT | Performed by: FAMILY MEDICINE

## 2024-04-05 NOTE — PROGRESS NOTES
Chart review complete.    - Recent hospitalization for weakness and then discharge to SNF ([facility])    Confirmation of at least 2 patient identifiers.  Monthly outreach complete.    Patient remains at [facility]. Reports she is unaware of coming discharge date. Currently states she has improved since hospitalization and feels a lot better. However, they're still trying to figure out instability of chronic conditions (hyper/hypoglycemic events, weakness, hallucinations). States they tend to lean towards blaming her lupus but she is unsure what's entirely going on. States been hard to keep appointments/ see specialist due to frequent hospitalizations and SNF admissions. Is aware of upcoming endocrinology appointment and is to get transportation from facility if she remains at [facility] at the time. Reviewed patient MyChart notification preferences to ensure patient is aware of any/all appointments or messages.     Denies any issues with swallowing/ eating.     States weakness is improving.    Patient denies further needs or concerns at this time. States understanding to call if any needs arise.

## 2024-04-06 LAB
ATRIAL RATE: 47 BPM
P AXIS: 63 DEGREES
P OFFSET: 189 MS
P ONSET: 120 MS
PR INTERVAL: 192 MS
Q ONSET: 216 MS
QRS COUNT: 7 BEATS
QRS DURATION: 104 MS
QT INTERVAL: 520 MS
QTC CALCULATION(BAZETT): 460 MS
QTC FREDERICIA: 479 MS
R AXIS: -7 DEGREES
T AXIS: -28 DEGREES
T OFFSET: 476 MS
VENTRICULAR RATE: 47 BPM

## 2024-04-09 ENCOUNTER — DOCUMENTATION (OUTPATIENT)
Dept: ADMINISTRATIVE | Facility: CLINIC | Age: 64
End: 2024-04-09
Payer: MEDICARE

## 2024-04-09 DIAGNOSIS — H35.039 HYPERTENSIVE RETINOPATHY, UNSPECIFIED LATERALITY: ICD-10-CM

## 2024-04-10 PROCEDURE — RXMED WILLOW AMBULATORY MEDICATION CHARGE

## 2024-04-10 RX ORDER — NIFEDIPINE 60 MG/1
TABLET, EXTENDED RELEASE ORAL
Qty: 90 TABLET | Refills: 1 | OUTPATIENT
Start: 2024-04-10

## 2024-04-10 NOTE — TELEPHONE ENCOUNTER
Patient states she is in a SNF, and doesn't know if she's taking it. She states she doesn't need anything sent to the pharmacy at this time.    Claudio Hood, DO   to Me       4/10/24  9:32 AM  We need to clarify to make sure she is taking this medicine I do not know who Dr. Pyle is he might be a hospitalist

## 2024-04-12 ENCOUNTER — PHARMACY VISIT (OUTPATIENT)
Dept: PHARMACY | Facility: CLINIC | Age: 64
End: 2024-04-12
Payer: MEDICARE

## 2024-04-24 ENCOUNTER — APPOINTMENT (OUTPATIENT)
Dept: RADIOLOGY | Facility: HOSPITAL | Age: 64
DRG: 637 | End: 2024-04-24
Payer: MEDICARE

## 2024-04-24 ENCOUNTER — TELEPHONE (OUTPATIENT)
Dept: PHARMACY | Facility: HOSPITAL | Age: 64
End: 2024-04-24
Payer: MEDICARE

## 2024-04-24 ENCOUNTER — APPOINTMENT (OUTPATIENT)
Dept: CARDIOLOGY | Facility: HOSPITAL | Age: 64
DRG: 637 | End: 2024-04-24
Payer: MEDICARE

## 2024-04-24 ENCOUNTER — HOSPITAL ENCOUNTER (INPATIENT)
Facility: HOSPITAL | Age: 64
LOS: 5 days | Discharge: NURSING, REHAB OR LONG-TERM CARE | DRG: 637 | End: 2024-04-30
Attending: EMERGENCY MEDICINE | Admitting: INTERNAL MEDICINE
Payer: MEDICARE

## 2024-04-24 DIAGNOSIS — M79.89 SWELLING OF RIGHT FOOT: ICD-10-CM

## 2024-04-24 DIAGNOSIS — E16.2 HYPOGLYCEMIA: Primary | ICD-10-CM

## 2024-04-24 DIAGNOSIS — R44.3 HALLUCINATIONS: ICD-10-CM

## 2024-04-24 DIAGNOSIS — M32.9 SYSTEMIC LUPUS ERYTHEMATOSUS, UNSPECIFIED SLE TYPE, UNSPECIFIED ORGAN INVOLVEMENT STATUS (MULTI): Chronic | ICD-10-CM

## 2024-04-24 LAB
ALBUMIN SERPL BCP-MCNC: 2.6 G/DL (ref 3.4–5)
ALBUMIN SERPL BCP-MCNC: 2.9 G/DL (ref 3.4–5)
ALP SERPL-CCNC: 98 U/L (ref 33–136)
ALT SERPL W P-5'-P-CCNC: 19 U/L (ref 7–45)
ANION GAP BLDV CALCULATED.4IONS-SCNC: 9 MMOL/L (ref 10–25)
ANION GAP SERPL CALC-SCNC: 10 MMOL/L (ref 10–20)
ANION GAP SERPL CALC-SCNC: 12 MMOL/L (ref 10–20)
APPEARANCE UR: ABNORMAL
AST SERPL W P-5'-P-CCNC: 20 U/L (ref 9–39)
BACTERIA #/AREA URNS AUTO: ABNORMAL /HPF
BASE EXCESS BLDV CALC-SCNC: -4.2 MMOL/L (ref -2–3)
BASOPHILS # BLD AUTO: 0 X10*3/UL (ref 0–0.1)
BASOPHILS NFR BLD AUTO: 0 %
BILIRUB SERPL-MCNC: 0.3 MG/DL (ref 0–1.2)
BILIRUB UR STRIP.AUTO-MCNC: NEGATIVE MG/DL
BODY TEMPERATURE: ABNORMAL
BUN SERPL-MCNC: 39 MG/DL (ref 6–23)
BUN SERPL-MCNC: 44 MG/DL (ref 6–23)
CA-I BLDV-SCNC: 1.25 MMOL/L (ref 1.1–1.33)
CALCIUM SERPL-MCNC: 7.9 MG/DL (ref 8.6–10.3)
CALCIUM SERPL-MCNC: 8.2 MG/DL (ref 8.6–10.3)
CHLORIDE BLDV-SCNC: 120 MMOL/L (ref 98–107)
CHLORIDE SERPL-SCNC: 118 MMOL/L (ref 98–107)
CHLORIDE SERPL-SCNC: 118 MMOL/L (ref 98–107)
CO2 SERPL-SCNC: 20 MMOL/L (ref 21–32)
CO2 SERPL-SCNC: 22 MMOL/L (ref 21–32)
COLOR UR: YELLOW
CREAT SERPL-MCNC: 1.81 MG/DL (ref 0.5–1.05)
CREAT SERPL-MCNC: 1.96 MG/DL (ref 0.5–1.05)
EGFRCR SERPLBLD CKD-EPI 2021: 28 ML/MIN/1.73M*2
EGFRCR SERPLBLD CKD-EPI 2021: 31 ML/MIN/1.73M*2
EOSINOPHIL # BLD AUTO: 0.02 X10*3/UL (ref 0–0.7)
EOSINOPHIL NFR BLD AUTO: 1 %
ERYTHROCYTE [DISTWIDTH] IN BLOOD BY AUTOMATED COUNT: 19.1 % (ref 11.5–14.5)
EST. AVERAGE GLUCOSE BLD GHB EST-MCNC: 194 MG/DL
GLUCOSE BLD MANUAL STRIP-MCNC: 106 MG/DL (ref 74–99)
GLUCOSE BLD MANUAL STRIP-MCNC: 117 MG/DL (ref 74–99)
GLUCOSE BLD MANUAL STRIP-MCNC: 53 MG/DL (ref 74–99)
GLUCOSE BLD MANUAL STRIP-MCNC: 61 MG/DL (ref 74–99)
GLUCOSE BLD MANUAL STRIP-MCNC: 63 MG/DL (ref 74–99)
GLUCOSE BLD MANUAL STRIP-MCNC: 70 MG/DL (ref 74–99)
GLUCOSE BLD MANUAL STRIP-MCNC: 86 MG/DL (ref 74–99)
GLUCOSE BLD MANUAL STRIP-MCNC: 99 MG/DL (ref 74–99)
GLUCOSE BLDV-MCNC: 51 MG/DL (ref 74–99)
GLUCOSE SERPL-MCNC: 52 MG/DL (ref 74–99)
GLUCOSE SERPL-MCNC: 79 MG/DL (ref 74–99)
GLUCOSE UR STRIP.AUTO-MCNC: NEGATIVE MG/DL
HBA1C MFR BLD: 8.4 %
HCO3 BLDV-SCNC: 21.6 MMOL/L (ref 22–26)
HCT VFR BLD AUTO: 23.2 % (ref 36–46)
HCT VFR BLD EST: 21 % (ref 36–46)
HGB BLD-MCNC: 7 G/DL (ref 12–16)
HGB BLDV-MCNC: 7 G/DL (ref 12–16)
HOLD SPECIMEN: NORMAL
IMM GRANULOCYTES # BLD AUTO: 0.01 X10*3/UL (ref 0–0.7)
IMM GRANULOCYTES NFR BLD AUTO: 0.5 % (ref 0–0.9)
INHALED O2 CONCENTRATION: 21 %
INR PPP: 1 (ref 0.9–1.1)
KETONES UR STRIP.AUTO-MCNC: NEGATIVE MG/DL
LACTATE BLDV-SCNC: 1 MMOL/L (ref 0.4–2)
LEUKOCYTE ESTERASE UR QL STRIP.AUTO: ABNORMAL
LYMPHOCYTES # BLD AUTO: 0.57 X10*3/UL (ref 1.2–4.8)
LYMPHOCYTES NFR BLD AUTO: 28.1 %
MAGNESIUM SERPL-MCNC: 1.39 MG/DL (ref 1.6–2.4)
MCH RBC QN AUTO: 30.7 PG (ref 26–34)
MCHC RBC AUTO-ENTMCNC: 30.2 G/DL (ref 32–36)
MCV RBC AUTO: 102 FL (ref 80–100)
MONOCYTES # BLD AUTO: 0.09 X10*3/UL (ref 0.1–1)
MONOCYTES NFR BLD AUTO: 4.4 %
NEUTROPHILS # BLD AUTO: 1.34 X10*3/UL (ref 1.2–7.7)
NEUTROPHILS NFR BLD AUTO: 66 %
NITRITE UR QL STRIP.AUTO: NEGATIVE
NRBC BLD-RTO: 0 /100 WBCS (ref 0–0)
OXYHGB MFR BLDV: 51.1 % (ref 45–75)
PCO2 BLDV: 42 MM HG (ref 41–51)
PH BLDV: 7.32 PH (ref 7.33–7.43)
PH UR STRIP.AUTO: 7 [PH]
PHOSPHATE SERPL-MCNC: 3.1 MG/DL (ref 2.5–4.9)
PLATELET # BLD AUTO: 59 X10*3/UL (ref 150–450)
PO2 BLDV: 38 MM HG (ref 35–45)
POTASSIUM BLDV-SCNC: 4.8 MMOL/L (ref 3.5–5.3)
POTASSIUM SERPL-SCNC: 4.6 MMOL/L (ref 3.5–5.3)
POTASSIUM SERPL-SCNC: 4.8 MMOL/L (ref 3.5–5.3)
PROT SERPL-MCNC: 5.4 G/DL (ref 6.4–8.2)
PROT UR STRIP.AUTO-MCNC: ABNORMAL MG/DL
PROTHROMBIN TIME: 11.5 SECONDS (ref 9.8–12.8)
RBC # BLD AUTO: 2.28 X10*6/UL (ref 4–5.2)
RBC # UR STRIP.AUTO: NEGATIVE /UL
RBC #/AREA URNS AUTO: ABNORMAL /HPF
SAO2 % BLDV: 52 % (ref 45–75)
SODIUM BLDV-SCNC: 146 MMOL/L (ref 136–145)
SODIUM SERPL-SCNC: 143 MMOL/L (ref 136–145)
SODIUM SERPL-SCNC: 147 MMOL/L (ref 136–145)
SP GR UR STRIP.AUTO: 1.01
UROBILINOGEN UR STRIP.AUTO-MCNC: <2 MG/DL
WBC # BLD AUTO: 2 X10*3/UL (ref 4.4–11.3)
WBC #/AREA URNS AUTO: >50 /HPF

## 2024-04-24 PROCEDURE — 36415 COLL VENOUS BLD VENIPUNCTURE: CPT | Performed by: STUDENT IN AN ORGANIZED HEALTH CARE EDUCATION/TRAINING PROGRAM

## 2024-04-24 PROCEDURE — G0378 HOSPITAL OBSERVATION PER HR: HCPCS

## 2024-04-24 PROCEDURE — 2500000004 HC RX 250 GENERAL PHARMACY W/ HCPCS (ALT 636 FOR OP/ED): Performed by: EMERGENCY MEDICINE

## 2024-04-24 PROCEDURE — 71045 X-RAY EXAM CHEST 1 VIEW: CPT

## 2024-04-24 PROCEDURE — 84132 ASSAY OF SERUM POTASSIUM: CPT | Performed by: EMERGENCY MEDICINE

## 2024-04-24 PROCEDURE — 81001 URINALYSIS AUTO W/SCOPE: CPT | Performed by: EMERGENCY MEDICINE

## 2024-04-24 PROCEDURE — 82947 ASSAY GLUCOSE BLOOD QUANT: CPT | Mod: 91

## 2024-04-24 PROCEDURE — 99285 EMERGENCY DEPT VISIT HI MDM: CPT | Performed by: EMERGENCY MEDICINE

## 2024-04-24 PROCEDURE — P9612 CATHETERIZE FOR URINE SPEC: HCPCS

## 2024-04-24 PROCEDURE — 99285 EMERGENCY DEPT VISIT HI MDM: CPT | Mod: 25

## 2024-04-24 PROCEDURE — 96365 THER/PROPH/DIAG IV INF INIT: CPT

## 2024-04-24 PROCEDURE — 36415 COLL VENOUS BLD VENIPUNCTURE: CPT | Performed by: EMERGENCY MEDICINE

## 2024-04-24 PROCEDURE — 2500000004 HC RX 250 GENERAL PHARMACY W/ HCPCS (ALT 636 FOR OP/ED)

## 2024-04-24 PROCEDURE — 96361 HYDRATE IV INFUSION ADD-ON: CPT

## 2024-04-24 PROCEDURE — 82947 ASSAY GLUCOSE BLOOD QUANT: CPT

## 2024-04-24 PROCEDURE — 2500000004 HC RX 250 GENERAL PHARMACY W/ HCPCS (ALT 636 FOR OP/ED): Performed by: STUDENT IN AN ORGANIZED HEALTH CARE EDUCATION/TRAINING PROGRAM

## 2024-04-24 PROCEDURE — 83735 ASSAY OF MAGNESIUM: CPT | Performed by: EMERGENCY MEDICINE

## 2024-04-24 PROCEDURE — 85610 PROTHROMBIN TIME: CPT | Performed by: STUDENT IN AN ORGANIZED HEALTH CARE EDUCATION/TRAINING PROGRAM

## 2024-04-24 PROCEDURE — 2500000005 HC RX 250 GENERAL PHARMACY W/O HCPCS

## 2024-04-24 PROCEDURE — 83036 HEMOGLOBIN GLYCOSYLATED A1C: CPT | Mod: STJLAB

## 2024-04-24 PROCEDURE — 85025 COMPLETE CBC W/AUTO DIFF WBC: CPT | Performed by: EMERGENCY MEDICINE

## 2024-04-24 PROCEDURE — 96375 TX/PRO/DX INJ NEW DRUG ADDON: CPT

## 2024-04-24 PROCEDURE — 84132 ASSAY OF SERUM POTASSIUM: CPT | Mod: 91 | Performed by: EMERGENCY MEDICINE

## 2024-04-24 PROCEDURE — 2500000001 HC RX 250 WO HCPCS SELF ADMINISTERED DRUGS (ALT 637 FOR MEDICARE OP)

## 2024-04-24 PROCEDURE — 99223 1ST HOSP IP/OBS HIGH 75: CPT

## 2024-04-24 PROCEDURE — 71045 X-RAY EXAM CHEST 1 VIEW: CPT | Performed by: RADIOLOGY

## 2024-04-24 PROCEDURE — 93005 ELECTROCARDIOGRAM TRACING: CPT

## 2024-04-24 PROCEDURE — 84132 ASSAY OF SERUM POTASSIUM: CPT | Mod: 91

## 2024-04-24 RX ORDER — DEXTROSE 50 % IN WATER (D50W) INTRAVENOUS SYRINGE
12.5
Status: DISCONTINUED | OUTPATIENT
Start: 2024-04-24 | End: 2024-04-30 | Stop reason: HOSPADM

## 2024-04-24 RX ORDER — LEVETIRACETAM 500 MG/1
500 TABLET ORAL EVERY 12 HOURS
Status: DISCONTINUED | OUTPATIENT
Start: 2024-04-24 | End: 2024-04-25

## 2024-04-24 RX ORDER — GUAIFENESIN 600 MG/1
1200 TABLET, EXTENDED RELEASE ORAL 2 TIMES DAILY
Status: ON HOLD | COMMUNITY

## 2024-04-24 RX ORDER — MAGNESIUM HYDROXIDE 2400 MG/10ML
30 SUSPENSION ORAL DAILY PRN
Status: ON HOLD | COMMUNITY
End: 2024-05-08 | Stop reason: ENTERED-IN-ERROR

## 2024-04-24 RX ORDER — AZITHROMYCIN 250 MG/1
250 TABLET, FILM COATED ORAL NIGHTLY
COMMUNITY
End: 2024-04-30 | Stop reason: HOSPADM

## 2024-04-24 RX ORDER — DEXTROSE MONOHYDRATE 50 MG/ML
75 INJECTION, SOLUTION INTRAVENOUS CONTINUOUS
Status: DISCONTINUED | OUTPATIENT
Start: 2024-04-24 | End: 2024-04-25

## 2024-04-24 RX ORDER — MAGNESIUM SULFATE HEPTAHYDRATE 40 MG/ML
2 INJECTION, SOLUTION INTRAVENOUS ONCE
Status: COMPLETED | OUTPATIENT
Start: 2024-04-24 | End: 2024-04-24

## 2024-04-24 RX ORDER — ATORVASTATIN CALCIUM 40 MG/1
40 TABLET, FILM COATED ORAL DAILY
Status: DISCONTINUED | OUTPATIENT
Start: 2024-04-24 | End: 2024-04-30 | Stop reason: HOSPADM

## 2024-04-24 RX ORDER — FOLIC ACID 1 MG/1
1 TABLET ORAL DAILY
Status: DISCONTINUED | OUTPATIENT
Start: 2024-04-24 | End: 2024-04-30 | Stop reason: HOSPADM

## 2024-04-24 RX ORDER — MORPHINE SULFATE 2 MG/ML
1 INJECTION, SOLUTION INTRAMUSCULAR; INTRAVENOUS ONCE
Status: COMPLETED | OUTPATIENT
Start: 2024-04-24 | End: 2024-04-24

## 2024-04-24 RX ORDER — PREDNISONE 20 MG/1
20 TABLET ORAL DAILY
Status: DISCONTINUED | OUTPATIENT
Start: 2024-04-25 | End: 2024-04-29

## 2024-04-24 RX ORDER — LANOLIN ALCOHOL/MO/W.PET/CERES
100 CREAM (GRAM) TOPICAL DAILY
Status: DISCONTINUED | OUTPATIENT
Start: 2024-04-24 | End: 2024-04-30 | Stop reason: HOSPADM

## 2024-04-24 RX ORDER — DEXTROSE MONOHYDRATE 100 MG/ML
100 INJECTION, SOLUTION INTRAVENOUS CONTINUOUS
Status: DISCONTINUED | OUTPATIENT
Start: 2024-04-24 | End: 2024-04-24

## 2024-04-24 RX ORDER — BISACODYL 10 MG/1
10 SUPPOSITORY RECTAL ONCE
Status: ON HOLD | COMMUNITY
End: 2024-05-08 | Stop reason: ENTERED-IN-ERROR

## 2024-04-24 RX ORDER — ACETAMINOPHEN 650 MG/1
650 SUPPOSITORY RECTAL EVERY 4 HOURS PRN
Status: ON HOLD | COMMUNITY
End: 2024-05-08 | Stop reason: ENTERED-IN-ERROR

## 2024-04-24 RX ORDER — ACETAMINOPHEN 325 MG/1
650 TABLET ORAL EVERY 4 HOURS PRN
Status: ON HOLD | COMMUNITY

## 2024-04-24 RX ORDER — DEXTROSE 50 % IN WATER (D50W) INTRAVENOUS SYRINGE
Status: COMPLETED
Start: 2024-04-24 | End: 2024-04-24

## 2024-04-24 RX ORDER — ACETAMINOPHEN 500 MG
5 TABLET ORAL NIGHTLY
Status: DISCONTINUED | OUTPATIENT
Start: 2024-04-24 | End: 2024-04-30 | Stop reason: HOSPADM

## 2024-04-24 RX ORDER — MYCOPHENOLATE MOFETIL 500 MG/1
1000 TABLET ORAL 2 TIMES DAILY
Status: DISCONTINUED | OUTPATIENT
Start: 2024-04-24 | End: 2024-04-24

## 2024-04-24 RX ORDER — PANTOPRAZOLE SODIUM 40 MG/1
40 TABLET, DELAYED RELEASE ORAL DAILY
Status: DISCONTINUED | OUTPATIENT
Start: 2024-04-24 | End: 2024-04-25

## 2024-04-24 RX ORDER — NIFEDIPINE 60 MG/1
60 TABLET, FILM COATED, EXTENDED RELEASE ORAL
Status: DISCONTINUED | OUTPATIENT
Start: 2024-04-25 | End: 2024-04-30 | Stop reason: HOSPADM

## 2024-04-24 RX ORDER — MYCOPHENOLATE MOFETIL 250 MG/1
1000 CAPSULE ORAL 2 TIMES DAILY
Status: DISCONTINUED | OUTPATIENT
Start: 2024-04-24 | End: 2024-04-30 | Stop reason: HOSPADM

## 2024-04-24 RX ORDER — RISPERIDONE 0.5 MG/1
0.5 TABLET ORAL NIGHTLY
Status: DISCONTINUED | OUTPATIENT
Start: 2024-04-24 | End: 2024-04-28

## 2024-04-24 RX ORDER — MULTIVIT-MIN/IRON FUM/FOLIC AC 7.5 MG-4
1 TABLET ORAL DAILY
Status: DISCONTINUED | OUTPATIENT
Start: 2024-04-24 | End: 2024-04-30 | Stop reason: HOSPADM

## 2024-04-24 RX ORDER — DEXTROSE 50 % IN WATER (D50W) INTRAVENOUS SYRINGE
25
Status: DISCONTINUED | OUTPATIENT
Start: 2024-04-24 | End: 2024-04-30 | Stop reason: HOSPADM

## 2024-04-24 RX ORDER — SODIUM POLYSTYRENE SULFONATE 15 G/60ML
30 SUSPENSION ORAL; RECTAL 2 TIMES DAILY
Status: ON HOLD | COMMUNITY
End: 2024-05-08 | Stop reason: ENTERED-IN-ERROR

## 2024-04-24 RX ORDER — PREDNISONE 5 MG/1
15 TABLET ORAL DAILY
COMMUNITY
End: 2024-05-18 | Stop reason: HOSPADM

## 2024-04-24 RX ORDER — DEXTROSE 50 % IN WATER (D50W) INTRAVENOUS SYRINGE
25 ONCE
Status: COMPLETED | OUTPATIENT
Start: 2024-04-24 | End: 2024-04-24

## 2024-04-24 RX ORDER — CEFTRIAXONE 2 G/50ML
2 INJECTION, SOLUTION INTRAVENOUS EVERY 24 HOURS
Status: DISCONTINUED | OUTPATIENT
Start: 2024-04-24 | End: 2024-04-28

## 2024-04-24 RX ORDER — MECLIZINE HYDROCHLORIDE 25 MG/1
25 TABLET ORAL EVERY 12 HOURS PRN
Status: ON HOLD | COMMUNITY

## 2024-04-24 RX ORDER — INSULIN GLARGINE 100 [IU]/ML
INJECTION, SOLUTION SUBCUTANEOUS 2 TIMES DAILY
COMMUNITY
End: 2024-04-30 | Stop reason: HOSPADM

## 2024-04-24 RX ORDER — IPRATROPIUM BROMIDE AND ALBUTEROL SULFATE 2.5; .5 MG/3ML; MG/3ML
3 SOLUTION RESPIRATORY (INHALATION) EVERY 6 HOURS PRN
Status: DISCONTINUED | OUTPATIENT
Start: 2024-04-24 | End: 2024-04-30 | Stop reason: HOSPADM

## 2024-04-24 RX ORDER — ALUMINUM HYDROXIDE, MAGNESIUM HYDROXIDE, AND SIMETHICONE 1200; 120; 1200 MG/30ML; MG/30ML; MG/30ML
30 SUSPENSION ORAL EVERY 4 HOURS PRN
Status: ON HOLD | COMMUNITY
End: 2024-05-08 | Stop reason: ENTERED-IN-ERROR

## 2024-04-24 RX ORDER — ENEMA 19; 7 G/133ML; G/133ML
1 ENEMA RECTAL EVERY 4 HOURS PRN
Status: ON HOLD | COMMUNITY
End: 2024-05-08 | Stop reason: ENTERED-IN-ERROR

## 2024-04-24 RX ADMIN — MORPHINE SULFATE 1 MG: 2 INJECTION, SOLUTION INTRAMUSCULAR; INTRAVENOUS at 13:34

## 2024-04-24 RX ADMIN — HYDROCORTISONE SODIUM SUCCINATE 50 MG: 100 INJECTION, POWDER, FOR SOLUTION INTRAMUSCULAR; INTRAVENOUS at 12:29

## 2024-04-24 RX ADMIN — DEXTROSE MONOHYDRATE 100 ML/HR: 100 INJECTION, SOLUTION INTRAVENOUS at 11:46

## 2024-04-24 RX ADMIN — MAGNESIUM SULFATE HEPTAHYDRATE 2 G: 40 INJECTION, SOLUTION INTRAVENOUS at 16:00

## 2024-04-24 RX ADMIN — CEFTRIAXONE SODIUM 2 G: 2 INJECTION, SOLUTION INTRAVENOUS at 23:00

## 2024-04-24 RX ADMIN — DEXTROSE 50 % IN WATER (D50W) INTRAVENOUS SYRINGE 25 G: at 10:03

## 2024-04-24 RX ADMIN — DEXTROSE MONOHYDRATE 75 ML/HR: 50 INJECTION, SOLUTION INTRAVENOUS at 17:50

## 2024-04-24 RX ADMIN — SODIUM CHLORIDE 1000 ML: 9 INJECTION, SOLUTION INTRAVENOUS at 14:41

## 2024-04-24 RX ADMIN — DEXTROSE MONOHYDRATE 25 G: 25 INJECTION, SOLUTION INTRAVENOUS at 10:03

## 2024-04-24 SDOH — SOCIAL STABILITY: SOCIAL INSECURITY: ARE YOU OR HAVE YOU BEEN THREATENED OR ABUSED PHYSICALLY, EMOTIONALLY, OR SEXUALLY BY ANYONE?: NO

## 2024-04-24 SDOH — SOCIAL STABILITY: SOCIAL INSECURITY: DOES ANYONE TRY TO KEEP YOU FROM HAVING/CONTACTING OTHER FRIENDS OR DOING THINGS OUTSIDE YOUR HOME?: NO

## 2024-04-24 SDOH — SOCIAL STABILITY: SOCIAL INSECURITY: ABUSE: ADULT

## 2024-04-24 SDOH — SOCIAL STABILITY: SOCIAL INSECURITY: DO YOU FEEL UNSAFE GOING BACK TO THE PLACE WHERE YOU ARE LIVING?: UNABLE TO ASSESS

## 2024-04-24 SDOH — SOCIAL STABILITY: SOCIAL INSECURITY: HAVE YOU HAD THOUGHTS OF HARMING ANYONE ELSE?: NO

## 2024-04-24 SDOH — SOCIAL STABILITY: SOCIAL INSECURITY: HAS ANYONE EVER THREATENED TO HURT YOUR FAMILY OR YOUR PETS?: NO

## 2024-04-24 SDOH — SOCIAL STABILITY: SOCIAL INSECURITY: ARE THERE ANY APPARENT SIGNS OF INJURIES/BEHAVIORS THAT COULD BE RELATED TO ABUSE/NEGLECT?: UNABLE TO ASSESS

## 2024-04-24 SDOH — SOCIAL STABILITY: SOCIAL INSECURITY: HAVE YOU HAD ANY THOUGHTS OF HARMING ANYONE ELSE?: NO

## 2024-04-24 SDOH — SOCIAL STABILITY: SOCIAL INSECURITY: DO YOU FEEL ANYONE HAS EXPLOITED OR TAKEN ADVANTAGE OF YOU FINANCIALLY OR OF YOUR PERSONAL PROPERTY?: NO

## 2024-04-24 SDOH — SOCIAL STABILITY: SOCIAL INSECURITY: WERE YOU ABLE TO COMPLETE ALL THE BEHAVIORAL HEALTH SCREENINGS?: NO

## 2024-04-24 ASSESSMENT — ACTIVITIES OF DAILY LIVING (ADL)
TOILETING: DEPENDENT
HEARING - LEFT EAR: FUNCTIONAL
ADEQUATE_TO_COMPLETE_ADL: YES
PATIENT'S MEMORY ADEQUATE TO SAFELY COMPLETE DAILY ACTIVITIES?: NO
HEARING - RIGHT EAR: FUNCTIONAL
WALKS IN HOME: DEPENDENT
FEEDING YOURSELF: DEPENDENT
DRESSING YOURSELF: DEPENDENT
JUDGMENT_ADEQUATE_SAFELY_COMPLETE_DAILY_ACTIVITIES: NO
GROOMING: DEPENDENT
BATHING: DEPENDENT
LACK_OF_TRANSPORTATION: NO

## 2024-04-24 ASSESSMENT — COLUMBIA-SUICIDE SEVERITY RATING SCALE - C-SSRS
4. HAVE YOU HAD THESE THOUGHTS AND HAD SOME INTENTION OF ACTING ON THEM?: NO
1. IN THE PAST MONTH, HAVE YOU WISHED YOU WERE DEAD OR WISHED YOU COULD GO TO SLEEP AND NOT WAKE UP?: NO
5. HAVE YOU STARTED TO WORK OUT OR WORKED OUT THE DETAILS OF HOW TO KILL YOURSELF? DO YOU INTEND TO CARRY OUT THIS PLAN?: NO
6. HAVE YOU EVER DONE ANYTHING, STARTED TO DO ANYTHING, OR PREPARED TO DO ANYTHING TO END YOUR LIFE?: NO
2. HAVE YOU ACTUALLY HAD ANY THOUGHTS OF KILLING YOURSELF?: NO

## 2024-04-24 ASSESSMENT — COGNITIVE AND FUNCTIONAL STATUS - GENERAL
MOBILITY SCORE: 10
EATING MEALS: A LOT
DRESSING REGULAR LOWER BODY CLOTHING: A LOT
PERSONAL GROOMING: TOTAL
TOILETING: A LOT
DRESSING REGULAR LOWER BODY CLOTHING: TOTAL
HELP NEEDED FOR BATHING: TOTAL
HELP NEEDED FOR BATHING: TOTAL
TURNING FROM BACK TO SIDE WHILE IN FLAT BAD: A LOT
TOILETING: TOTAL
WALKING IN HOSPITAL ROOM: TOTAL
DAILY ACTIVITIY SCORE: 6
STANDING UP FROM CHAIR USING ARMS: A LOT
STANDING UP FROM CHAIR USING ARMS: A LOT
MOBILITY SCORE: 10
WALKING IN HOSPITAL ROOM: TOTAL
CLIMB 3 TO 5 STEPS WITH RAILING: TOTAL
DAILY ACTIVITIY SCORE: 11
MOVING FROM LYING ON BACK TO SITTING ON SIDE OF FLAT BED WITH BEDRAILS: A LOT
MOVING FROM LYING ON BACK TO SITTING ON SIDE OF FLAT BED WITH BEDRAILS: A LOT
EATING MEALS: TOTAL
DRESSING REGULAR UPPER BODY CLOTHING: A LOT
PERSONAL GROOMING: A LOT
DRESSING REGULAR UPPER BODY CLOTHING: TOTAL
MOVING TO AND FROM BED TO CHAIR: A LOT
MOVING TO AND FROM BED TO CHAIR: A LOT
PATIENT BASELINE BEDBOUND: YES
CLIMB 3 TO 5 STEPS WITH RAILING: TOTAL
TURNING FROM BACK TO SIDE WHILE IN FLAT BAD: A LOT

## 2024-04-24 ASSESSMENT — LIFESTYLE VARIABLES
HAVE YOU EVER FELT YOU SHOULD CUT DOWN ON YOUR DRINKING: NO
TOTAL SCORE: 0
HOW OFTEN DO YOU HAVE 6 OR MORE DRINKS ON ONE OCCASION: NEVER
HAVE PEOPLE ANNOYED YOU BY CRITICIZING YOUR DRINKING: NO
HOW MANY STANDARD DRINKS CONTAINING ALCOHOL DO YOU HAVE ON A TYPICAL DAY: PATIENT DOES NOT DRINK
SKIP TO QUESTIONS 9-10: 1
HOW OFTEN DO YOU HAVE A DRINK CONTAINING ALCOHOL: NEVER
EVER HAD A DRINK FIRST THING IN THE MORNING TO STEADY YOUR NERVES TO GET RID OF A HANGOVER: NO
AUDIT-C TOTAL SCORE: 0
SUBSTANCE_ABUSE_PAST_12_MONTHS: NO
PRESCIPTION_ABUSE_PAST_12_MONTHS: NO
EVER FELT BAD OR GUILTY ABOUT YOUR DRINKING: NO
AUDIT-C TOTAL SCORE: 0

## 2024-04-24 ASSESSMENT — PAIN - FUNCTIONAL ASSESSMENT
PAIN_FUNCTIONAL_ASSESSMENT: 0-10
PAIN_FUNCTIONAL_ASSESSMENT: UNABLE TO SELF-REPORT

## 2024-04-24 ASSESSMENT — PAIN SCALES - GENERAL: PAINLEVEL_OUTOF10: 0 - NO PAIN

## 2024-04-24 NOTE — ED PROVIDER NOTES
HPI   Chief Complaint   Patient presents with    Hypoglycemia     Pt was 54 at facility, they gave IM glucagon. She was 72 in squad and 54 upon arrival here in ER       She is a 63-year-old female past medical history of lupus, adrenal insufficiency on daily prednisone who presents to the ED brought in by EMS for hypoglycemia and reported complaint of allover body pain.  She was given a glucagon shot at the nursing home which did not sufficiently raise blood sugar and is in the ED.  She is also reported to be complaining of allover body pain whenever she is touched.                          Devils Elbow Coma Scale Score: 8                     Patient History   Past Medical History:   Diagnosis Date    Abnormal kidney function 04/04/2023    Acute headache 03/10/2024    Acute low back pain 10/30/2023    Acute upper respiratory infection, unspecified 03/04/2020    Acute URI    Acute upper respiratory infection, unspecified 09/30/2015    URTI (acute upper respiratory infection)    Arthritis     Atypical facial pain 03/10/2024    Body mass index (BMI) 23.0-23.9, adult 10/15/2021    BMI 23.0-23.9, adult    Body mass index (BMI) 33.0-33.9, adult 03/04/2020    BMI 33.0-33.9,adult    Cardiomegaly 08/27/2013    Left ventricular hypertrophy    Chest pain 06/10/2022    Comment on above: Added by Problem List Migration; 2013-3-12; Moved to Suppressed Nov 25 2013 9:16PM; Comment on above: Added by Problem List Migration; 2013-3-12; Moved to Suppressed Nov 25 2013 9:16PM;    Chronic kidney disease, stage 3 unspecified (Multi) 07/02/2013    Chronic kidney disease, stage III (moderate)    Confusional state 03/10/2024    Contact with and (suspected) exposure to covid-19 04/04/2023    Delirium 03/10/2024    Disease of pericardium, unspecified ([facility]) 07/02/2013    Pericardial disease    Encounter for follow-up examination after completed treatment for conditions other than malignant neoplasm 10/06/2022    Hospital discharge follow-up     Generalized contraction of visual field, right eye 01/29/2015    Generalized contraction of visual field of right eye    Hearing loss 03/10/2024    History of cataract 03/10/2024    History of thrombocytopenia 03/10/2024    Comment on above: Added by Problem List Migration; 2013-7-2;    Homonymous bilateral field defects, right side 04/29/2016    Homonymous bilateral field defects of right side    Hypertensive chronic kidney disease with stage 1 through stage 4 chronic kidney disease, or unspecified chronic kidney disease 07/02/2013    Nephrosclerosis    Laceration without foreign body, left foot, initial encounter 07/03/2018    Foot laceration, left, initial encounter    Localized edema 03/10/2024    Mass of skin 03/10/2024    Migraine with aura, not intractable, without status migrainosus 10/24/2022    Ocular migraine    Open wound 03/10/2024    Open wound of left foot 03/10/2024    Other conditions influencing health status 07/02/2013    Chronic Glomerulonephritis In Diseases Classified Elsewhere    Other conditions influencing health status 07/02/2013    Progressive Familial Myoclonic Epilepsy    Other conditions influencing health status 07/02/2013    Protein S Deficiency    Other conditions influencing health status 05/22/2015    Familial Combined Hyperlipidemia    Other conditions influencing health status 10/24/2022    IDDM (insulin dependent diabetes mellitus)    Other conditions influencing health status 03/14/2022    Diabetes mellitus, insulin dependent (IDDM), uncontrolled    Other long term (current) drug therapy 10/24/2022    Long-term use of Plaquenil    Overweight with body mass index (BMI) 25.0-29.9 03/10/2024    Pain of toe 03/10/2024    Personal history of COVID-19 04/04/2023    Personal history of diseases of the blood and blood-forming organs and certain disorders involving the immune mechanism 07/02/2013    History of thrombocytopenia    Personal history of diseases of the skin and subcutaneous  tissue 08/11/2015    History of foot ulcer    Personal history of nephrotic syndrome 07/02/2013    History of nephrotic syndrome    Personal history of other diseases of the circulatory system 08/27/2013    History of sinus tachycardia    Personal history of other diseases of the nervous system and sense organs     History of cataract    Personal history of other diseases of the respiratory system     History of bronchitis    Personal history of other infectious and parasitic diseases 07/02/2013    History of hepatitis    Personal history of other specified conditions     History of shortness of breath    Personal history of other specified conditions 08/27/2013    History of edema    Posterior epistaxis 03/10/2024    Puckering of macula, right eye 10/24/2022    ERM OD (epiretinal membrane, right eye)    Raynaud's syndrome without gangrene 07/02/2013    Raynaud's disease    Sinusitis 03/10/2024    Systemic lupus erythematosus, unspecified (Multi) 07/24/2015    SLE (systemic lupus erythematosus)    Systemic lupus erythematosus, unspecified (Multi) 07/24/2015    SLE (systemic lupus erythematosus)    Systemic lupus erythematosus, unspecified (Multi) 07/24/2015    Systemic lupus    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) 07/02/2013    Type 2 diabetes with nephropathy    Type 2 diabetes mellitus with mild nonproliferative diabetic retinopathy without macular edema, left eye (Multi) 07/27/2015    Non-proliferative diabetic retinopathy, left eye    Type 2 diabetes mellitus with mild nonproliferative diabetic retinopathy without macular edema, unspecified eye (Multi) 07/24/2015    Mild non proliferative diabetic retinopathy    Type 2 diabetes mellitus with proliferative diabetic retinopathy without macular edema, right eye (Multi) 07/27/2015    Proliferative diabetic retinopathy of right eye    Type 2 diabetes mellitus with proliferative diabetic retinopathy without macular edema, unspecified eye (Multi)  07/24/2015    Diabetic proliferative retinopathy    Unspecified acute and subacute iridocyclitis 07/24/2015    Acute iritis, right eye    Unspecified open wound, left foot, sequela 07/03/2018    Wound, open, foot, left, sequela     Past Surgical History:   Procedure Laterality Date    ANKLE SURGERY  01/29/2015    Ankle Surgery    CHOLECYSTECTOMY  01/29/2015    Cholecystectomy    CT GUIDED PERCUTANEOUS BIOPSY BONE DEEP  5/4/2021    CT GUIDED PERCUTANEOUS BIOPSY BONE DEEP 5/4/2021 [facility] CLINICAL LEGAC[facility]    EYE SURGERY  03/06/2015    Eye Surgery    FOOT SURGERY  01/29/2015    Foot Surgery    MR HEAD ANGIO WO IV CONTRAST  7/26/2013    MR HEAD ANGIO WO IV CONTRAST 7/26/2013 [facility] CLINICAL LEGACY    MR HEAD ANGIO WO IV CONTRAST  9/17/2021    MR HEAD ANGIO WO IV CONTRAST 9/17/2021 [facility]    MR HEAD ANGIO WO IV CONTRAST  3/25/2023    MR HEAD ANGIO WO IV CONTRAST [facility]    MR NECK ANGIO WO IV CONTRAST  7/26/2013    MR NECK ANGIO WO IV CONTRAST 7/26/2013 [facility] CLINICAL LEGACY    MR NECK ANGIO WO IV CONTRAST  9/17/2021    MR NECK ANGIO WO IV CONTRAST 9/17/2021 [facility]    MR NECK ANGIO WO IV CONTRAST  3/25/2023    MR NECK ANGIO WO IV CONTRAST [facility]    OTHER SURGICAL HISTORY  01/29/2015    Creation Of Pericardial Window    OTHER SURGICAL HISTORY  01/29/2015    Quadricepsplasty    TOTAL HIP ARTHROPLASTY  01/29/2015    Hip Replacement     Family History   Problem Relation Name Age of Onset    Other (RENAL DISEASE) Mother          END STAGE    Other (CARDIAC DISORDER) Mother      Cataracts Mother      Stroke Mother      Diabetes Mother      Kidney disease Mother      Lupus Mother      Other (CARDIAC DISORDER) Father      COPD Father      Glaucoma Father      Hypertension Father      Sleep apnea Father      Other (CARDIAC DISORDER) Sister      Depression Sister      Kidney disease Sister      Sickle cell trait Sister      Sleep apnea Daughter       Social History     Tobacco Use    Smoking status: Never      Passive exposure: Never    Smokeless tobacco: Never   Vaping Use    Vaping status: Never Used   Substance Use Topics    Alcohol use: Not Currently     Comment: RARE    Drug use: Never       Physical Exam   ED Triage Vitals [04/24/24 0953]   Temperature Heart Rate Respirations BP   36.1 °C (97 °F) 77 18 128/66      Pulse Ox Temp src Heart Rate Source Patient Position   99 % -- -- Lying      BP Location FiO2 (%)     Right arm --       Physical Exam  HENT:      Head: Normocephalic and atraumatic.      Mouth/Throat:      Mouth: Mucous membranes are dry.   Cardiovascular:      Rate and Rhythm: Normal rate.   Pulmonary:      Effort: Pulmonary effort is normal.      Breath sounds: Normal breath sounds.   Abdominal:      General: Abdomen is flat.      Palpations: Abdomen is soft.   Skin:     General: Skin is warm and dry.      Capillary Refill: Capillary refill takes less than 2 seconds.   Neurological:      Mental Status: She is disoriented.         ED Course & MDM   Diagnoses as of 04/24/24 1622   Hypoglycemia       Medical Decision Making  On arrival to ED noted to be hypoglycemic was given D50, with some improvement of the sugar, her mental status improved mildly, but at this time she is not following commands.  She does move all extremities.  She is able to give thumbs up on command.  We will obtain laboratory studies to evaluate for further cause of hyperglycemia at this time.    Notable for CKD, anemia, leukopenia and thrombocytopenia.  She became hypoglycemic again, so at this time I started her on D10 infusion.  Her mental status improved, however she is not eating or drinking sufficiently to support her blood sugar.  Given Solu-Cortef due to concerns for adrenal insufficiency.  Case discussed with the teaching service who agrees to accept patient under teaching service for further workup and management    Discussed with the attending  Kojo Singleton DO PGY-4  Emergency Medicine        Procedure  Procedures      Kojo Singleton, DO  Resident  04/24/24 1622    The patient was seen by the resident/fellow.  I have personally performed a substantive portion of the encounter.  I have seen and examined the patient; agree with the workup, evaluation, MDM, management and diagnosis.  The care plan has been discussed with the resident; I have reviewed the resident’s note and agree with the documented findings.         Magen Parsons,   04/25/24 [id number]

## 2024-04-24 NOTE — TELEPHONE ENCOUNTER
DME company needed a new order and I tried to provide documentation but they need an encounter that specifically discusses DM. Not sure how to proceed as the only encounter in 2024 did not discuss use of Dexcom.    They need new notes faxed that include this documentation and are signed by PCP.     Notably, patient went to ED again today for hypoglycemia.

## 2024-04-24 NOTE — H&P
"History Of Present Illness  Bing Holliday is a 63 y.o. 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  When attempts made to question the patient about recent history patient repeatedly stated \"I do not know\" and refused to participate in any questioning.  Per ED staff and EMS staff patient was brought in for hypoglycemia.  She received glucagon at the facility and had refractory hypoglycemia after giving D50 en route.    When reached out to [facility] I was informed of:  -Patient has been having decreased oral intake but still taking in fluids and foods p.o.  This morning when nurse went to evaluate the patient patient was shaking and vitals were checked and found that the glucose was in the 50s.  She was then given IM glucagon and patient was refusing to take anything in by mouth at that time the nurse reached out to the doctor and recommended that the patient be transferred out to the emergency department.  Nurse states that the patient has not missed any of her medications and has still been getting her insulin as well as her daily prednisone in the setting of decreased oral intake.  But she received no meds this morning.    In the ED:  -Vitals: Temp 97 F, HR 77, RR 18, /66, SpO2 99% RA  -Labs: CMP: Glucose 52, sodium 147, chloride 118, BUN/creatinine 44/1.96, calcium 8.2, magnesium 1.39             CBC: WBC 2.0, hemoglobin 7.0, platelets 59             VB.32/42/38/21.6             POCT glucose: 78-33-51-61-             UA: Greater than 50 WBC, 1+ bacteria, hazy, 3+ leukocyte esterase, 2+ protein  -Imaging: None  -Intervention: D50, 50 mg hydrocortisone, 2 g magnesium, 1 mg morphine, 1 L NS bolus, patient started on D10 drip.    CODE STATUS: Assume full code     Past Medical History  Past Medical History:   Diagnosis " Date    Abnormal kidney function 04/04/2023    Acute headache 03/10/2024    Acute low back pain 10/30/2023    Acute upper respiratory infection, unspecified 03/04/2020    Acute URI    Acute upper respiratory infection, unspecified 09/30/2015    URTI (acute upper respiratory infection)    Arthritis     Atypical facial pain 03/10/2024    Body mass index (BMI) 23.0-23.9, adult 10/15/2021    BMI 23.0-23.9, adult    Body mass index (BMI) 33.0-33.9, adult 03/04/2020    BMI 33.0-33.9,adult    Cardiomegaly 08/27/2013    Left ventricular hypertrophy    Chest pain 06/10/2022    Comment on above: Added by Problem List Migration; 2013-3-12; Moved to Suppressed Nov 25 2013 9:16PM; Comment on above: Added by Problem List Migration; 2013-3-12; Moved to Suppressed Nov 25 2013 9:16PM;    Chronic kidney disease, stage 3 unspecified (Multi) 07/02/2013    Chronic kidney disease, stage III (moderate)    Confusional state 03/10/2024    Contact with and (suspected) exposure to covid-19 04/04/2023    Delirium 03/10/2024    Disease of pericardium, unspecified ([facility]) 07/02/2013    Pericardial disease    Encounter for follow-up examination after completed treatment for conditions other than malignant neoplasm 10/06/2022    Hospital discharge follow-up    Generalized contraction of visual field, right eye 01/29/2015    Generalized contraction of visual field of right eye    Hearing loss 03/10/2024    History of cataract 03/10/2024    History of thrombocytopenia 03/10/2024    Comment on above: Added by Problem List Migration; 2013-7-2;    Homonymous bilateral field defects, right side 04/29/2016    Homonymous bilateral field defects of right side    Hypertensive chronic kidney disease with stage 1 through stage 4 chronic kidney disease, or unspecified chronic kidney disease 07/02/2013    Nephrosclerosis    Laceration without foreign body, left foot, initial encounter 07/03/2018    Foot laceration, left, initial encounter    Localized edema  03/10/2024    Mass of skin 03/10/2024    Migraine with aura, not intractable, without status migrainosus 10/24/2022    Ocular migraine    Open wound 03/10/2024    Open wound of left foot 03/10/2024    Other conditions influencing health status 07/02/2013    Chronic Glomerulonephritis In Diseases Classified Elsewhere    Other conditions influencing health status 07/02/2013    Progressive Familial Myoclonic Epilepsy    Other conditions influencing health status 07/02/2013    Protein S Deficiency    Other conditions influencing health status 05/22/2015    Familial Combined Hyperlipidemia    Other conditions influencing health status 10/24/2022    IDDM (insulin dependent diabetes mellitus)    Other conditions influencing health status 03/14/2022    Diabetes mellitus, insulin dependent (IDDM), uncontrolled    Other long term (current) drug therapy 10/24/2022    Long-term use of Plaquenil    Overweight with body mass index (BMI) 25.0-29.9 03/10/2024    Pain of toe 03/10/2024    Personal history of COVID-19 04/04/2023    Personal history of diseases of the blood and blood-forming organs and certain disorders involving the immune mechanism 07/02/2013    History of thrombocytopenia    Personal history of diseases of the skin and subcutaneous tissue 08/11/2015    History of foot ulcer    Personal history of nephrotic syndrome 07/02/2013    History of nephrotic syndrome    Personal history of other diseases of the circulatory system 08/27/2013    History of sinus tachycardia    Personal history of other diseases of the nervous system and sense organs     History of cataract    Personal history of other diseases of the respiratory system     History of bronchitis    Personal history of other infectious and parasitic diseases 07/02/2013    History of hepatitis    Personal history of other specified conditions     History of shortness of breath    Personal history of other specified conditions 08/27/2013    History of edema     Posterior epistaxis 03/10/2024    Puckering of macula, right eye 10/24/2022    ERM OD (epiretinal membrane, right eye)    Raynaud's syndrome without gangrene 07/02/2013    Raynaud's disease    Sinusitis 03/10/2024    Systemic lupus erythematosus, unspecified (Multi) 07/24/2015    SLE (systemic lupus erythematosus)    Systemic lupus erythematosus, unspecified (Multi) 07/24/2015    SLE (systemic lupus erythematosus)    Systemic lupus erythematosus, unspecified (Multi) 07/24/2015    Systemic lupus    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) 07/02/2013    Type 2 diabetes with nephropathy    Type 2 diabetes mellitus with mild nonproliferative diabetic retinopathy without macular edema, left eye (Multi) 07/27/2015    Non-proliferative diabetic retinopathy, left eye    Type 2 diabetes mellitus with mild nonproliferative diabetic retinopathy without macular edema, unspecified eye (Multi) 07/24/2015    Mild non proliferative diabetic retinopathy    Type 2 diabetes mellitus with proliferative diabetic retinopathy without macular edema, right eye (Multi) 07/27/2015    Proliferative diabetic retinopathy of right eye    Type 2 diabetes mellitus with proliferative diabetic retinopathy without macular edema, unspecified eye (Multi) 07/24/2015    Diabetic proliferative retinopathy    Unspecified acute and subacute iridocyclitis 07/24/2015    Acute iritis, right eye    Unspecified open wound, left foot, sequela 07/03/2018    Wound, open, foot, left, sequela       Surgical History  Past Surgical History:   Procedure Laterality Date    ANKLE SURGERY  01/29/2015    Ankle Surgery    CHOLECYSTECTOMY  01/29/2015    Cholecystectomy    CT GUIDED PERCUTANEOUS BIOPSY BONE DEEP  5/4/2021    CT GUIDED PERCUTANEOUS BIOPSY BONE DEEP 5/4/2021 [facility] CLINICAL LEGACY    EYE SURGERY  03/06/2015    Eye Surgery    FOOT SURGERY  01/29/2015    Foot Surgery    MR HEAD ANGIO WO IV CONTRAST  7/26/2013    MR HEAD ANGIO WO IV CONTRAST  "7/26/2013 [facility] CLINICAL LEGACY    MR HEAD ANGIO WO IV CONTRAST  9/17/2021    MR HEAD ANGIO WO IV CONTRAST 9/17/2021 [facility] EMERGENCY LEGACY    MR HEAD ANGIO WO IV CONTRAST  3/25/2023    MR HEAD ANGIO WO IV CONTRAST [facility]    MR NECK ANGIO WO IV CONTRAST  7/26/2013    MR NECK ANGIO WO IV CONTRAST 7/26/2013 [facility] CLINICAL LEGACY    MR NECK ANGIO WO IV CONTRAST  9/17/2021    MR NECK ANGIO WO IV CONTRAST 9/17/2021 [facility]    MR NECK ANGIO WO IV CONTRAST  3/25/2023    MR NECK ANGIO WO IV CONTRAST [facility]    OTHER SURGICAL HISTORY  01/29/2015    Creation Of Pericardial Window    OTHER SURGICAL HISTORY  01/29/2015    Quadricepsplasty    TOTAL HIP ARTHROPLASTY  01/29/2015    Hip Replacement        Social History  She reports that she has never smoked. She has never been exposed to tobacco smoke. She has never used smokeless tobacco. She reports that she does not currently use alcohol. She reports that she does not use drugs.    Family History  Family History   Problem Relation Name Age of Onset    Other (RENAL DISEASE) Mother          END STAGE    Other (CARDIAC DISORDER) Mother      Cataracts Mother      Stroke Mother      Diabetes Mother      Kidney disease Mother      Lupus Mother      Other (CARDIAC DISORDER) Father      COPD Father      Glaucoma Father      Hypertension Father      Sleep apnea Father      Other (CARDIAC DISORDER) Sister      Depression Sister      Kidney disease Sister      Sickle cell trait Sister      Sleep apnea Daughter          Allergies  Ace inhibitors, Hydroxychloroquine, Lisinopril, Penicillins, and Sulfa (sulfonamide antibiotics)    Review of Systems   Unable to perform ROS: Other   Patient refused to answer any questions stating \"I do not know\".  However patient will follow instructions and commands.     Physical Exam  General: Not in acute distress, A&O x 0, alert, uncooperative, well-developed, continually repeating \"I do not know\", patient endorsing diffuse tenderness to feather " "light palpation over entirety of body, but no tenderness to palpation when patient is distracted.  HEENT: Normocephalic, atraumatic, EOMI, moist mucous membranes  Neck: Neck supple, trachea midline, no evidence of trauma  Cardiovascular: RRR, S1 and S2 appreciated, no murmurs rubs gallops appreciated, distal pulses 2+ bilaterally (radial and dorsalis pedis), diffusely tender to palpation of chest wall, even when stethoscope placed on chest wall to listen to heart sounds and lung sounds  Respiratory: Vesicular breath sounds appreciated bilaterally, no adventitious sounds appreciated, no increased work of breathing on room air  GI: Abdomen soft, nondistended, diffusely tender to palpation including when stethoscope placed on stomach, bowel sounds present  Extremities: Trace edema appreciated in lower extremities bilaterally, no cyanosis, diffusely tender to palpation feather light in nature.  Neuro: A&O X0, no focal deficits, strength and sensation intact bilaterally, moving all extremities, withdrawing to pain which is diffuse and throughout with extremely light palpation.  Refusing to cooperate with any portion of interview and questioning.  Stating \"I do not know\"  Skin: Warm and dry, without lesions or rashes    Last Recorded Vitals  Blood pressure 127/82, pulse 72, temperature 36.2 °C (97.2 °F), resp. rate 18, height 1.702 m (5' 7\"), weight 90.9 kg (200 lb 6.4 oz), SpO2 (!) 93%.    Relevant Results  Scheduled medications  apixaban, 2.5 mg, oral, BID  atorvastatin, 40 mg, oral, Daily  folic acid, 1 mg, oral, Daily  levETIRAcetam, 500 mg, oral, q12h  magnesium sulfate, 2 g, intravenous, Once  melatonin, 5 mg, oral, Nightly  multivitamin with minerals, 1 tablet, oral, Daily  mycophenolate, 1,000 mg, oral, BID  [START ON 4/25/2024] NIFEdipine ER, 60 mg, oral, Daily before breakfast  pantoprazole, 40 mg, oral, Daily  risperiDONE, 0.5 mg, oral, Nightly  thiamine, 100 mg, oral, Daily      Continuous medications     PRN " medications  PRN medications: dextrose, dextrose, glucagon, glucagon  Results for orders placed or performed during the hospital encounter of 04/24/24 (from the past 24 hour(s))   POCT GLUCOSE   Result Value Ref Range    POCT Glucose 53 (L) 74 - 99 mg/dL   CBC and Auto Differential   Result Value Ref Range    WBC 2.0 (L) 4.4 - 11.3 x10*3/uL    nRBC 0.0 0.0 - 0.0 /100 WBCs    RBC 2.28 (L) 4.00 - 5.20 x10*6/uL    Hemoglobin 7.0 (L) 12.0 - 16.0 g/dL    Hematocrit 23.2 (L) 36.0 - 46.0 %     (H) 80 - 100 fL    MCH 30.7 26.0 - 34.0 pg    MCHC 30.2 (L) 32.0 - 36.0 g/dL    RDW 19.1 (H) 11.5 - 14.5 %    Platelets 59 (L) 150 - 450 x10*3/uL    Neutrophils % 66.0 40.0 - 80.0 %    Immature Granulocytes %, Automated 0.5 0.0 - 0.9 %    Lymphocytes % 28.1 13.0 - 44.0 %    Monocytes % 4.4 2.0 - 10.0 %    Eosinophils % 1.0 0.0 - 6.0 %    Basophils % 0.0 0.0 - 2.0 %    Neutrophils Absolute 1.34 1.20 - 7.70 x10*3/uL    Immature Granulocytes Absolute, Automated 0.01 0.00 - 0.70 x10*3/uL    Lymphocytes Absolute 0.57 (L) 1.20 - 4.80 x10*3/uL    Monocytes Absolute 0.09 (L) 0.10 - 1.00 x10*3/uL    Eosinophils Absolute 0.02 0.00 - 0.70 x10*3/uL    Basophils Absolute 0.00 0.00 - 0.10 x10*3/uL   Comprehensive metabolic panel   Result Value Ref Range    Glucose 52 (LL) 74 - 99 mg/dL    Sodium 147 (H) 136 - 145 mmol/L    Potassium 4.6 3.5 - 5.3 mmol/L    Chloride 118 (H) 98 - 107 mmol/L    Bicarbonate 22 21 - 32 mmol/L    Anion Gap 12 10 - 20 mmol/L    Urea Nitrogen 44 (H) 6 - 23 mg/dL    Creatinine 1.96 (H) 0.50 - 1.05 mg/dL    eGFR 28 (L) >60 mL/min/1.73m*2    Calcium 8.2 (L) 8.6 - 10.3 mg/dL    Albumin 2.9 (L) 3.4 - 5.0 g/dL    Alkaline Phosphatase 98 33 - 136 U/L    Total Protein 5.4 (L) 6.4 - 8.2 g/dL    AST 20 9 - 39 U/L    Bilirubin, Total 0.3 0.0 - 1.2 mg/dL    ALT 19 7 - 45 U/L   Magnesium   Result Value Ref Range    Magnesium 1.39 (L) 1.60 - 2.40 mg/dL   Urinalysis with Reflex Microscopic   Result Value Ref Range    Color, Urine  Yellow Straw, Yellow    Appearance, Urine Hazy (N) Clear    Specific Gravity, Urine 1.014 1.005 - 1.035    pH, Urine 7.0 5.0, 5.5, 6.0, 6.5, 7.0, 7.5, 8.0    Protein, Urine 100 (2+) (N) NEGATIVE mg/dL    Glucose, Urine NEGATIVE NEGATIVE mg/dL    Blood, Urine NEGATIVE NEGATIVE    Ketones, Urine NEGATIVE NEGATIVE mg/dL    Bilirubin, Urine NEGATIVE NEGATIVE    Urobilinogen, Urine <2.0 <2.0 mg/dL    Nitrite, Urine NEGATIVE NEGATIVE    Leukocyte Esterase, Urine LARGE (3+) (A) NEGATIVE   BLOOD GAS VENOUS FULL PANEL   Result Value Ref Range    POCT pH, Venous 7.32 (L) 7.33 - 7.43 pH    POCT pCO2, Venous 42 41 - 51 mm Hg    POCT pO2, Venous 38 35 - 45 mm Hg    POCT SO2, Venous 52 45 - 75 %    POCT Oxy Hemoglobin, Venous 51.1 45.0 - 75.0 %    POCT Hematocrit Calculated, Venous 21.0 (L) 36.0 - 46.0 %    POCT Sodium, Venous 146 (H) 136 - 145 mmol/L    POCT Potassium, Venous 4.8 3.5 - 5.3 mmol/L    POCT Chloride, Venous 120 (H) 98 - 107 mmol/L    POCT Ionized Calicum, Venous 1.25 1.10 - 1.33 mmol/L    POCT Glucose, Venous 51 (LL) 74 - 99 mg/dL    POCT Lactate, Venous 1.0 0.4 - 2.0 mmol/L    POCT Base Excess, Venous -4.2 (L) -2.0 - 3.0 mmol/L    POCT HCO3 Calculated, Venous 21.6 (L) 22.0 - 26.0 mmol/L    POCT Hemoglobin, Venous 7.0 (L) 12.0 - 16.0 g/dL    POCT Anion Gap, Venous 9.0 (L) 10.0 - 25.0 mmol/L    Patient Temperature      FiO2 21 %   Microscopic Only, Urine   Result Value Ref Range    WBC, Urine >50 (A) 1-5, NONE /HPF    RBC, Urine 1-2 NONE, 1-2, 3-5 /HPF    Bacteria, Urine 1+ (A) NONE SEEN /HPF   POCT GLUCOSE   Result Value Ref Range    POCT Glucose 99 74 - 99 mg/dL   Protime-INR   Result Value Ref Range    Protime 11.5 9.8 - 12.8 seconds    INR 1.0 0.9 - 1.1   POCT GLUCOSE   Result Value Ref Range    POCT Glucose 63 (L) 74 - 99 mg/dL   POCT GLUCOSE   Result Value Ref Range    POCT Glucose 61 (L) 74 - 99 mg/dL   POCT GLUCOSE   Result Value Ref Range    POCT Glucose 70 (L) 74 - 99 mg/dL   POCT GLUCOSE   Result Value  Ref Range    POCT Glucose 117 (H) 74 - 99 mg/dL          Assessment/Plan   Principal Problem:    Hypoglycemia  Patient is a 63-year-old female with PMHx 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  Patient presents to [facility] ED for chief complaint of hyperglycemia from [facility].  According to nursing staff patient was found to be shaking and altered this morning in the setting of decreased p.o. intake, then vitals were checked and found to have a glucose in the 50s, she was given IM glucagon and refused to take anything by mouth.  Patient was then sent to [facility] ED by facility doctor.  In the emergency department patient remained hyperglycemic despite an amp of D50 en route via EMS and an amp of D50 in the emergency department.  She was subsequently placed on a D10 drip and admitted to internal medicine service for further evaluation management of hypoglycemia in the setting of decreased oral intake.    1.  Hypoglycemia in the setting of IDDM2, 2/2 chronic steroid use: Patient has a known history of hypoglycemia with multitudinous hospital presentations  Patient presents to [facility] ED for chief complaint of hypoglycemia this is in the setting of decreased oral intake and still receiving her insulin at nursing facility.  -S/p 2 A of D50 1 en route and 1 in EMS, s/p D10 infusion in the emergency department, s/p glucagon IM at facility  -Most recent glucose 117  -Continue hypoglycemia protocol  -Hold home insulin  -Encourage p.o. intake    2. Secondary AI  -S/p 50 mg Solu-Cortef  -Continue Prednisone 20mg daily     3. 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  -S/p 50 mg Solu-Cortef  -Continue prednisone 20mg daily  -Continue home CellCept 1 g twice daily     4. Atrial fibrillation on Eliquis  -Continue home Eliquis 2.5 mg twice daily  -Electrolytes  optimized     5.HFrEF with Lower extremity swelling, (chronic)  TTE 3/10/2024: LVEF 40 to 45% and low normal RV systolic function.  Mild/moderate MR.  Moderately elevated RVSP of 49.5, global hypokinesis of left ventricle with minor regional variations   -Continue nifedipine 30 mg  -Continue home atorvastatin 40 mg     6. Seizure Hx:  - Continue home Keppra 500 mg twice daily     7. COPD not in exacerbation with cough  - DuoNebs every 6 hours prn  -CXR obtained at facility a few days ago unable to be evaluated, will order repeat CXR    8.  GERD:  -Continue home pantoprazole 40 mg    9 Hx transient AMS with hallucinations:  -Continue home Risperdal    IVF: D5W at 75 cc/HR  Diet: Diabetic  DVT prophylaxis: Continue home Eliquis  Dispo: Anticipate 24 hours hospital stay for resolution of hypoglycemia  Consults: None  CODE STATUS: Assume full code    Patient to be staffed with attending physician.    Andrew Aranda MD  Internal medicine PGY 1

## 2024-04-25 ENCOUNTER — APPOINTMENT (OUTPATIENT)
Dept: RADIOLOGY | Facility: HOSPITAL | Age: 64
DRG: 637 | End: 2024-04-25
Payer: MEDICARE

## 2024-04-25 ENCOUNTER — HOSPITAL ENCOUNTER (OUTPATIENT)
Dept: CARDIOLOGY | Facility: HOSPITAL | Age: 64
Discharge: HOME | End: 2024-04-25
Payer: MEDICARE

## 2024-04-25 ENCOUNTER — APPOINTMENT (OUTPATIENT)
Dept: CARDIOLOGY | Facility: HOSPITAL | Age: 64
DRG: 637 | End: 2024-04-25
Payer: MEDICARE

## 2024-04-25 LAB
ABO GROUP (TYPE) IN BLOOD: NORMAL
ALBUMIN SERPL BCP-MCNC: 2.4 G/DL (ref 3.4–5)
ALBUMIN SERPL BCP-MCNC: 2.8 G/DL (ref 3.4–5)
AMMONIA PLAS-SCNC: 25 UMOL/L (ref 16–53)
ANION GAP SERPL CALC-SCNC: 10 MMOL/L (ref 10–20)
ANION GAP SERPL CALC-SCNC: 11 MMOL/L (ref 10–20)
ANTIBODY SCREEN: NORMAL
BLOOD EXPIRATION DATE: NORMAL
BUN SERPL-MCNC: 32 MG/DL (ref 6–23)
BUN SERPL-MCNC: 35 MG/DL (ref 6–23)
C PEPTIDE SERPL-MCNC: 2.9 NG/ML (ref 0.7–3.9)
CALCIUM SERPL-MCNC: 7.9 MG/DL (ref 8.6–10.3)
CALCIUM SERPL-MCNC: 8.3 MG/DL (ref 8.6–10.3)
CHLORIDE SERPL-SCNC: 116 MMOL/L (ref 98–107)
CHLORIDE SERPL-SCNC: 116 MMOL/L (ref 98–107)
CO2 SERPL-SCNC: 21 MMOL/L (ref 21–32)
CO2 SERPL-SCNC: 21 MMOL/L (ref 21–32)
CREAT SERPL-MCNC: 1.62 MG/DL (ref 0.5–1.05)
CREAT SERPL-MCNC: 1.64 MG/DL (ref 0.5–1.05)
DISPENSE STATUS: NORMAL
EGFRCR SERPLBLD CKD-EPI 2021: 35 ML/MIN/1.73M*2
EGFRCR SERPLBLD CKD-EPI 2021: 36 ML/MIN/1.73M*2
ERYTHROCYTE [DISTWIDTH] IN BLOOD BY AUTOMATED COUNT: 19.2 % (ref 11.5–14.5)
FERRITIN SERPL-MCNC: 1060 NG/ML (ref 8–150)
FOLATE SERPL-MCNC: >24 NG/ML
GLUCOSE BLD MANUAL STRIP-MCNC: 112 MG/DL (ref 74–99)
GLUCOSE BLD MANUAL STRIP-MCNC: 115 MG/DL (ref 74–99)
GLUCOSE BLD MANUAL STRIP-MCNC: 128 MG/DL (ref 74–99)
GLUCOSE BLD MANUAL STRIP-MCNC: 131 MG/DL (ref 74–99)
GLUCOSE BLD MANUAL STRIP-MCNC: 132 MG/DL (ref 74–99)
GLUCOSE BLD MANUAL STRIP-MCNC: 215 MG/DL (ref 74–99)
GLUCOSE BLD MANUAL STRIP-MCNC: 59 MG/DL (ref 74–99)
GLUCOSE BLD MANUAL STRIP-MCNC: 61 MG/DL (ref 74–99)
GLUCOSE BLD MANUAL STRIP-MCNC: 63 MG/DL (ref 74–99)
GLUCOSE BLD MANUAL STRIP-MCNC: 83 MG/DL (ref 74–99)
GLUCOSE SERPL-MCNC: 105 MG/DL (ref 74–99)
GLUCOSE SERPL-MCNC: 112 MG/DL (ref 74–99)
HCT VFR BLD AUTO: 21.7 % (ref 36–46)
HGB BLD-MCNC: 6.4 G/DL (ref 12–16)
HGB RETIC QN: 31 PG (ref 28–38)
HOLD SPECIMEN: NORMAL
IMMATURE RETIC FRACTION: 10.7 %
IRON SATN MFR SERPL: ABNORMAL %
IRON SERPL-MCNC: 153 UG/DL (ref 35–150)
LACTATE SERPL-SCNC: 1.4 MMOL/L (ref 0.4–2)
LDH SERPL L TO P-CCNC: 218 U/L (ref 84–246)
MAGNESIUM SERPL-MCNC: 1.6 MG/DL (ref 1.6–2.4)
MCH RBC QN AUTO: 30.3 PG (ref 26–34)
MCHC RBC AUTO-ENTMCNC: 29.5 G/DL (ref 32–36)
MCV RBC AUTO: 103 FL (ref 80–100)
NRBC BLD-RTO: 1 /100 WBCS (ref 0–0)
PHOSPHATE SERPL-MCNC: 2.6 MG/DL (ref 2.5–4.9)
PHOSPHATE SERPL-MCNC: 2.8 MG/DL (ref 2.5–4.9)
PLATELET # BLD AUTO: 49 X10*3/UL (ref 150–450)
POTASSIUM SERPL-SCNC: 4.3 MMOL/L (ref 3.5–5.3)
POTASSIUM SERPL-SCNC: 4.4 MMOL/L (ref 3.5–5.3)
PRODUCT BLOOD TYPE: 5100
PRODUCT CODE: NORMAL
RBC # BLD AUTO: 2.11 X10*6/UL (ref 4–5.2)
RETICS #: 0.01 X10*6/UL (ref 0.02–0.08)
RETICS/RBC NFR AUTO: 0.4 % (ref 0.5–2)
RH FACTOR (ANTIGEN D): NORMAL
SODIUM SERPL-SCNC: 143 MMOL/L (ref 136–145)
SODIUM SERPL-SCNC: 144 MMOL/L (ref 136–145)
TIBC SERPL-MCNC: ABNORMAL UG/DL
TSH SERPL-ACNC: 2.88 MIU/L (ref 0.44–3.98)
UIBC SERPL-MCNC: <55 UG/DL (ref 110–370)
UNIT ABO: NORMAL
UNIT NUMBER: NORMAL
UNIT RH: NORMAL
UNIT VOLUME: 350
WBC # BLD AUTO: 2.1 X10*3/UL (ref 4.4–11.3)
XM INTEP: NORMAL

## 2024-04-25 PROCEDURE — 83735 ASSAY OF MAGNESIUM: CPT

## 2024-04-25 PROCEDURE — 84443 ASSAY THYROID STIM HORMONE: CPT

## 2024-04-25 PROCEDURE — 86920 COMPATIBILITY TEST SPIN: CPT

## 2024-04-25 PROCEDURE — 36415 COLL VENOUS BLD VENIPUNCTURE: CPT

## 2024-04-25 PROCEDURE — 85045 AUTOMATED RETICULOCYTE COUNT: CPT

## 2024-04-25 PROCEDURE — 83615 LACTATE (LD) (LDH) ENZYME: CPT

## 2024-04-25 PROCEDURE — 2500000004 HC RX 250 GENERAL PHARMACY W/ HCPCS (ALT 636 FOR OP/ED)

## 2024-04-25 PROCEDURE — 2500000006 HC RX 250 W HCPCS SELF ADMINISTERED DRUGS (ALT 637 FOR ALL PAYERS): Mod: MUE | Performed by: PSYCHIATRY & NEUROLOGY

## 2024-04-25 PROCEDURE — 83540 ASSAY OF IRON: CPT

## 2024-04-25 PROCEDURE — 86850 RBC ANTIBODY SCREEN: CPT | Mod: 91 | Performed by: INTERNAL MEDICINE

## 2024-04-25 PROCEDURE — 36430 TRANSFUSION BLD/BLD COMPNT: CPT

## 2024-04-25 PROCEDURE — 82746 ASSAY OF FOLIC ACID SERUM: CPT | Mod: STJLAB

## 2024-04-25 PROCEDURE — 93970 EXTREMITY STUDY: CPT

## 2024-04-25 PROCEDURE — 1200000002 HC GENERAL ROOM WITH TELEMETRY DAILY

## 2024-04-25 PROCEDURE — C1751 CATH, INF, PER/CENT/MIDLINE: HCPCS

## 2024-04-25 PROCEDURE — 74176 CT ABD & PELVIS W/O CONTRAST: CPT

## 2024-04-25 PROCEDURE — 76937 US GUIDE VASCULAR ACCESS: CPT

## 2024-04-25 PROCEDURE — 87040 BLOOD CULTURE FOR BACTERIA: CPT | Mod: STJLAB

## 2024-04-25 PROCEDURE — 82565 ASSAY OF CREATININE: CPT | Mod: 91

## 2024-04-25 PROCEDURE — 83605 ASSAY OF LACTIC ACID: CPT

## 2024-04-25 PROCEDURE — 82728 ASSAY OF FERRITIN: CPT

## 2024-04-25 PROCEDURE — 84681 ASSAY OF C-PEPTIDE: CPT | Mod: STJLAB

## 2024-04-25 PROCEDURE — 2720000007 HC OR 272 NO HCPCS

## 2024-04-25 PROCEDURE — 73200 CT UPPER EXTREMITY W/O DYE: CPT | Mod: LT

## 2024-04-25 PROCEDURE — 82947 ASSAY GLUCOSE BLOOD QUANT: CPT

## 2024-04-25 PROCEDURE — 85027 COMPLETE CBC AUTOMATED: CPT

## 2024-04-25 PROCEDURE — 82140 ASSAY OF AMMONIA: CPT

## 2024-04-25 PROCEDURE — 73200 CT UPPER EXTREMITY W/O DYE: CPT | Mod: LEFT SIDE | Performed by: RADIOLOGY

## 2024-04-25 PROCEDURE — 90792 PSYCH DIAG EVAL W/MED SRVCS: CPT | Performed by: PSYCHIATRY & NEUROLOGY

## 2024-04-25 PROCEDURE — C9113 INJ PANTOPRAZOLE SODIUM, VIA: HCPCS

## 2024-04-25 PROCEDURE — 2500000001 HC RX 250 WO HCPCS SELF ADMINISTERED DRUGS (ALT 637 FOR MEDICARE OP)

## 2024-04-25 PROCEDURE — P9016 RBC LEUKOCYTES REDUCED: HCPCS

## 2024-04-25 PROCEDURE — 93970 EXTREMITY STUDY: CPT | Performed by: INTERNAL MEDICINE

## 2024-04-25 PROCEDURE — 74176 CT ABD & PELVIS W/O CONTRAST: CPT | Performed by: RADIOLOGY

## 2024-04-25 PROCEDURE — 80069 RENAL FUNCTION PANEL: CPT | Mod: 91,MUE

## 2024-04-25 PROCEDURE — 2500000005 HC RX 250 GENERAL PHARMACY W/O HCPCS

## 2024-04-25 RX ORDER — FAMOTIDINE 10 MG/ML
20 INJECTION INTRAVENOUS
Status: DISCONTINUED | OUTPATIENT
Start: 2024-04-25 | End: 2024-04-30

## 2024-04-25 RX ORDER — OLANZAPINE 5 MG/1
2.5 TABLET ORAL 2 TIMES DAILY
Status: DISCONTINUED | OUTPATIENT
Start: 2024-04-25 | End: 2024-04-26

## 2024-04-25 RX ORDER — LEVETIRACETAM 5 MG/ML
500 INJECTION INTRAVASCULAR ONCE
Status: COMPLETED | OUTPATIENT
Start: 2024-04-25 | End: 2024-04-25

## 2024-04-25 RX ORDER — LEVETIRACETAM 5 MG/ML
500 INJECTION INTRAVASCULAR EVERY 12 HOURS
Status: DISCONTINUED | OUTPATIENT
Start: 2024-04-25 | End: 2024-04-30 | Stop reason: HOSPADM

## 2024-04-25 RX ORDER — LIDOCAINE HYDROCHLORIDE 10 MG/ML
5 INJECTION, SOLUTION EPIDURAL; INFILTRATION; INTRACAUDAL; PERINEURAL ONCE
Status: DISCONTINUED | OUTPATIENT
Start: 2024-04-25 | End: 2024-04-30 | Stop reason: HOSPADM

## 2024-04-25 RX ORDER — METRONIDAZOLE 500 MG/100ML
500 INJECTION, SOLUTION INTRAVENOUS EVERY 8 HOURS
Status: DISCONTINUED | OUTPATIENT
Start: 2024-04-25 | End: 2024-04-28

## 2024-04-25 RX ORDER — PANTOPRAZOLE SODIUM 40 MG/10ML
40 INJECTION, POWDER, LYOPHILIZED, FOR SOLUTION INTRAVENOUS DAILY
Status: DISCONTINUED | OUTPATIENT
Start: 2024-04-25 | End: 2024-04-30 | Stop reason: HOSPADM

## 2024-04-25 RX ORDER — MAGNESIUM SULFATE HEPTAHYDRATE 40 MG/ML
4 INJECTION, SOLUTION INTRAVENOUS ONCE
Status: COMPLETED | OUTPATIENT
Start: 2024-04-25 | End: 2024-04-25

## 2024-04-25 RX ORDER — DEXTROSE, SODIUM CHLORIDE, SODIUM LACTATE, POTASSIUM CHLORIDE, AND CALCIUM CHLORIDE 5; .6; .31; .03; .02 G/100ML; G/100ML; G/100ML; G/100ML; G/100ML
75 INJECTION, SOLUTION INTRAVENOUS CONTINUOUS
Status: DISCONTINUED | OUTPATIENT
Start: 2024-04-25 | End: 2024-04-25

## 2024-04-25 RX ORDER — DEXTROSE MONOHYDRATE 100 MG/ML
75 INJECTION, SOLUTION INTRAVENOUS CONTINUOUS
Status: ACTIVE | OUTPATIENT
Start: 2024-04-25 | End: 2024-04-26

## 2024-04-25 RX ADMIN — METRONIDAZOLE 500 MG: 500 INJECTION, SOLUTION INTRAVENOUS at 17:43

## 2024-04-25 RX ADMIN — HYDROCORTISONE SODIUM SUCCINATE 25 MG: 100 INJECTION, POWDER, FOR SOLUTION INTRAMUSCULAR; INTRAVENOUS at 15:27

## 2024-04-25 RX ADMIN — FAMOTIDINE 20 MG: 10 INJECTION, SOLUTION INTRAVENOUS at 10:29

## 2024-04-25 RX ADMIN — DEXTROSE MONOHYDRATE 12.5 G: 25 INJECTION, SOLUTION INTRAVENOUS at 11:30

## 2024-04-25 RX ADMIN — DEXTROSE MONOHYDRATE 12.5 G: 25 INJECTION, SOLUTION INTRAVENOUS at 04:18

## 2024-04-25 RX ADMIN — SODIUM CHLORIDE, SODIUM LACTATE, POTASSIUM CHLORIDE, CALCIUM CHLORIDE AND DEXTROSE MONOHYDRATE 75 ML/HR: 5; 600; 310; 30; 20 INJECTION, SOLUTION INTRAVENOUS at 08:30

## 2024-04-25 RX ADMIN — DEXTROSE MONOHYDRATE 75 ML/HR: 100 INJECTION, SOLUTION INTRAVENOUS at 12:24

## 2024-04-25 RX ADMIN — RISPERIDONE 0.5 MG: 0.5 TABLET ORAL at 14:22

## 2024-04-25 RX ADMIN — HYDROCORTISONE SODIUM SUCCINATE 25 MG: 100 INJECTION, POWDER, FOR SOLUTION INTRAMUSCULAR; INTRAVENOUS at 23:44

## 2024-04-25 RX ADMIN — DEXTROSE MONOHYDRATE 12.5 G: 25 INJECTION, SOLUTION INTRAVENOUS at 08:03

## 2024-04-25 RX ADMIN — HYDROCORTISONE SODIUM SUCCINATE 50 MG: 100 INJECTION, POWDER, FOR SOLUTION INTRAMUSCULAR; INTRAVENOUS at 10:29

## 2024-04-25 RX ADMIN — PANTOPRAZOLE SODIUM 40 MG: 40 INJECTION, POWDER, FOR SOLUTION INTRAVENOUS at 10:29

## 2024-04-25 RX ADMIN — MAGNESIUM SULFATE HEPTAHYDRATE 4 G: 40 INJECTION, SOLUTION INTRAVENOUS at 08:31

## 2024-04-25 RX ADMIN — SODIUM CHLORIDE 50 ML: 9 INJECTION, SOLUTION INTRAVENOUS at 12:26

## 2024-04-25 RX ADMIN — DEXTROSE MONOHYDRATE 75 ML/HR: 50 INJECTION, SOLUTION INTRAVENOUS at 04:32

## 2024-04-25 RX ADMIN — METRONIDAZOLE 500 MG: 500 INJECTION, SOLUTION INTRAVENOUS at 10:30

## 2024-04-25 RX ADMIN — Medication 5 MG: at 23:44

## 2024-04-25 RX ADMIN — LEVETIRACETAM 500 MG: 5 INJECTION INTRAVENOUS at 15:27

## 2024-04-25 RX ADMIN — MYCOPHENOLATE MOFETIL 1000 MG: 250 CAPSULE ORAL at 23:43

## 2024-04-25 RX ADMIN — OLANZAPINE 2.5 MG: 5 TABLET, FILM COATED ORAL at 21:00

## 2024-04-25 RX ADMIN — APIXABAN 2.5 MG: 2.5 TABLET, FILM COATED ORAL at 23:44

## 2024-04-25 RX ADMIN — HYDROMORPHONE HYDROCHLORIDE 0.2 MG: 0.2 INJECTION, SOLUTION INTRAMUSCULAR; INTRAVENOUS; SUBCUTANEOUS at 10:30

## 2024-04-25 RX ADMIN — RISPERIDONE 0.5 MG: 0.5 TABLET ORAL at 23:44

## 2024-04-25 RX ADMIN — LEVETIRACETAM 500 MG: 5 INJECTION INTRAVENOUS at 05:07

## 2024-04-25 ASSESSMENT — COGNITIVE AND FUNCTIONAL STATUS - GENERAL
TOILETING: TOTAL
PERSONAL GROOMING: TOTAL
DRESSING REGULAR UPPER BODY CLOTHING: TOTAL
TURNING FROM BACK TO SIDE WHILE IN FLAT BAD: A LOT
STANDING UP FROM CHAIR USING ARMS: A LOT
DRESSING REGULAR LOWER BODY CLOTHING: TOTAL
CLIMB 3 TO 5 STEPS WITH RAILING: TOTAL
MOBILITY SCORE: 10
HELP NEEDED FOR BATHING: TOTAL
MOVING FROM LYING ON BACK TO SITTING ON SIDE OF FLAT BED WITH BEDRAILS: A LOT
DAILY ACTIVITIY SCORE: 6
EATING MEALS: TOTAL
WALKING IN HOSPITAL ROOM: TOTAL
MOVING TO AND FROM BED TO CHAIR: A LOT

## 2024-04-25 ASSESSMENT — PAIN - FUNCTIONAL ASSESSMENT: PAIN_FUNCTIONAL_ASSESSMENT: WONG-BAKER FACES

## 2024-04-25 ASSESSMENT — PAIN SCALES - GENERAL: PAINLEVEL_OUTOF10: 0 - NO PAIN

## 2024-04-25 ASSESSMENT — PAIN SCALES - WONG BAKER: WONGBAKER_NUMERICALRESPONSE: NO HURT

## 2024-04-25 NOTE — PROGRESS NOTES
"Nutrition Initial Assessment:   Nutrition Assessment    Reason for Assessment: Admission nursing screening    Patient is a 63 y.o. female presenting with altered mental status and decreased intakes. Psych on consult noting delirium and steroid induced psychosis. She will continue to be monitored for suicidal ideation and risk. 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     Nutrition History:  Energy Intake: Poor < 50 %  Food and Nutrient History: Pt returned from [facility] where she was apparently eatinf >75% the majority of her stay over the last few weeks, but the last 3 days show refusal to 25% intakes per nursing at the facility. The nurse was not sure if they were feeding her or not. Today, patient smiled at me and nodded her head yes to almost every question asked. She said yes to not liking the food; yes to the staff feeding her and yes to not eating. She has not been interested in anything to eat since admit either.  Vitamin/Herbal Supplement Use: Folic Acid, MVI, Thiamine - 100mg daily  Food Allergies/Intolerances:  None  GI Symptoms: None  Oral Problems: None Last admit (3/22) SLP eval: regular/thin     Pain Score: 0 - No pain    Anthropometrics:  Height: 170.2 cm (5' 7\")   Weight: 90.9 kg (200 lb 6.4 oz)   BMI (Calculated): 31.38  IBW/kg (Dietitian Calculated): 61.24 kg  Percent of IBW: 148.44 %       Weight History:   Wt Readings from Last 10 Encounters:   04/24/24 90.9 kg (200 lb 6.4 oz)   03/19/24 96.5 kg (212 lb 11.9 oz)   03/11/24 96.5 kg (212 lb 11.2 oz)   02/14/24 86.2 kg (190 lb)   02/08/24 87.5 kg (193 lb)   01/20/24 99.3 kg (218 lb 14.7 oz)   01/17/24 96 kg (211 lb 10.3 oz)   12/26/23 93 kg (205 lb)   12/18/23 93.4 kg (205 lb " 14.6 oz)   11/30/23 105 kg (231 lb)       Weight Change %:  Weight History / % Weight Change: loss of 5.6kg(5.8%) withing the past 30 days.  Significant Weight Loss: Yes    Nutrition Focused Physical Exam Findings:  defer: pt refuses  Subcutaneous Fat Loss:      Muscle Wasting:     Edema:  Edema Location: non-pitting  Physical Findings:  Skin: Positive (leg left; upper; proximal wound and pressure injury to right buttocks)    Nutrition Significant Labs:  CBC Trend:   Results from last 7 days   Lab Units 04/25/24  0523 04/24/24  1006   WBC AUTO x10*3/uL 2.1* 2.0*   RBC AUTO x10*6/uL 2.11* 2.28*   HEMOGLOBIN g/dL 6.4* 7.0*   HEMATOCRIT % 21.7* 23.2*   MCV fL 103* 102*   PLATELETS AUTO x10*3/uL 49* 59*    , BMP Trend:   Results from last 7 days   Lab Units 04/25/24  1256 04/25/24  0523 04/24/24  2139 04/24/24  1006   GLUCOSE mg/dL 105* 112* 79 52*   CALCIUM mg/dL 8.3* 7.9* 7.9* 8.2*   SODIUM mmol/L 144 143 143 147*   POTASSIUM mmol/L 4.4 4.3 4.8 4.6   CO2 mmol/L 21 21 20* 22   CHLORIDE mmol/L 116* 116* 118* 118*   BUN mg/dL 32* 35* 39* 44*   CREATININE mg/dL 1.62* 1.64* 1.81* 1.96*    , A1C:  Lab Results   Component Value Date    HGBA1C 8.4 (H) 04/24/2024   , BG POCT trend:   Results from last 7 days   Lab Units 04/25/24  1553 04/25/24  1203 04/25/24  1126 04/25/24  0825 04/25/24  0801   POCT GLUCOSE mg/dL 132* 131* 61* 112* 63*    , Vit D:   Lab Results   Component Value Date    VITD25 34 12/10/2022    , Vit B12:   Lab Results   Component Value Date    NKZBZJDJ63 1,499 (H) 03/20/2024    , Iron Panel:   Lab Results   Component Value Date    IRON 153 (H) 04/25/2024    TIBC  04/25/2024      Comment:      One or more of the analytes used in this calculation is outside of the analytical measurement range.      FERRITIN 1,060 (H) 04/25/2024    , Folate:   Lab Results   Component Value Date    FOLATE >24.0 04/25/2024    , CF Vitamin Labs:   Lab Results   Component Value Date    VITD25 34 12/10/2022        Nutrition Specific  Medications:  Reviewed    I/O:    ;      Dietary Orders (From admission, onward)       Start     Ordered    04/24/24 1822  May Participate in Room Service With Assistance  Once        Question:  .  Answer:  Yes    04/24/24 1823 04/24/24 1617  Adult diet Regular  Diet effective now        Question:  Diet type  Answer:  Regular    04/24/24 1616                     Estimated Needs:   Total Energy Estimated Needs (kCal):  (1802 kcal (MSJ x 1.2 x 1.0) or 20 kcal/kg)     Total Protein Estimated Needs (g):  (91-100g protein (1.0-1.1g/kg))     Total Fluid Estimated Needs (mL):  (1802 mL (1mL/kcal))           Nutrition Diagnosis   Malnutrition Diagnosis  Patient has Malnutrition Diagnosis: Yes  Diagnosis Status: New  Malnutrition Diagnosis: Moderate malnutrition related to acute disease or injury  As Evidenced by: significant weight loss of 5.6kg(5.8%) since last admit 30 days ago and refusing intakes for the last 3-4 days.            Nutrition Interventions/Recommendations         Nutrition Prescription:  Individualized Nutrition Prescription Provided for : Continue REGULAR diet for now. Consider CHO-controlled diet once pt starts eating more then 50% of her meals.        Nutrition Interventions:   Interventions: Meals and snacks, Feeding assistance, Medical food supplement  Meals and Snacks: General healthful diet  Goal: Send 3 meals daily - pt not appropriate to order on her own at this time.  Medical Food Supplement: Commercial beverage  Goal: Send Glucerna Shake at breakfast and dinner daily to supplement intakes (220 kcal/10g protein each).  Feeding Assistance: Meal set-up, Menu selection assistance  Goal: Pt may need assistance with feeding - hands appear to be contracted more then her usual.    Goal: Case discussed with AMINAH Ortez    Nutrition Education:   Pt not appropriate at this time. Tried to help her order, but she will not respond to me.       Nutrition Monitoring and Evaluation   Food/Nutrient Related  History Monitoring  Monitoring and Evaluation Plan: Amount of food  Amount of Food: Estimated amout of food, Medical food intake  Criteria: Goal for pt to consume >50% of the meals and supplements sent.    Body Composition/Growth/Weight History  Monitoring and Evaluation Plan: Weight  Criteria: No further wieght loss.    Biochemical Data, Medical Tests and Procedures  Monitoring and Evaluation Plan: Electrolyte/renal panel  Electrolyte and Renal Panel: BUN, Creatinine  Criteria: to return to normal range    Nutrition Focused Physical Findings  Monitoring and Evaluation Plan: Digestive System, Skin  Digestive System: Decrease in appetite  Criteria: Appetite to improve  Skin: Impaired wound healing  Criteria: Skin to heal.       Time Spent/Follow-up Reminder:   Time Spent (min): 30 minutes  Last Date of Nutrition Visit: 04/25/24  Nutrition Follow-Up Needed?: 3-8 days  Follow up Comment: SANDRA

## 2024-04-25 NOTE — CONSULTS
Reason for consult:  AMS    History Of Present Illness  Bing Holliday is a 63 y.o. female presenting with AMS. Pt is from SN[facility] where she was found to be confused with low BS.  Pt during the assessment is alert and oriented x 2. Pt informed the resident physician that she hurt herself in her arm and she is currently on 1 to 1. Pt was noticed to be punching self by the sitter this morning. Pt report that she has h/o lupus and was recently started on steroid for exacerbation orally 2 weeks ago and currently receiving IV steroid  Pt has been noticing bug crawling all over her and in the room. Denies any AH  Denies any paranoid thoughts  Her mood has been labile  Denies any suicidal thoughts to me  Want to get better     Pt live with her adult son. Not working     Past Medical History  She has a past medical history of Abnormal kidney function (04/04/2023), Acute headache (03/10/2024), Acute low back pain (10/30/2023), Acute upper respiratory infection, unspecified (03/04/2020), Acute upper respiratory infection, unspecified (09/30/2015), Arthritis, Atypical facial pain (03/10/2024), Body mass index (BMI) 23.0-23.9, adult (10/15/2021), Body mass index (BMI) 33.0-33.9, adult (03/04/2020), Cardiomegaly (08/27/2013), Chest pain (06/10/2022), Chronic kidney disease, stage 3 unspecified (Multi) (07/02/2013), Confusional state (03/10/2024), Contact with and (suspected) exposure to covid-19 (04/04/2023), Delirium (03/10/2024), Disease of pericardium, unspecified ([facility]) (07/02/2013), Encounter for follow-up examination after completed treatment for conditions other than malignant neoplasm (10/06/2022), Generalized contraction of visual field, right eye (01/29/2015), Hearing loss (03/10/2024), History of cataract (03/10/2024), History of thrombocytopenia (03/10/2024), Homonymous bilateral field defects, right side (04/29/2016), Hypertensive chronic kidney disease with stage 1 through stage 4 chronic kidney disease, or  unspecified chronic kidney disease (07/02/2013), Laceration without foreign body, left foot, initial encounter (07/03/2018), Localized edema (03/10/2024), Mass of skin (03/10/2024), Migraine with aura, not intractable, without status migrainosus (10/24/2022), Open wound (03/10/2024), Open wound of left foot (03/10/2024), Other conditions influencing health status (07/02/2013), Other conditions influencing health status (07/02/2013), Other conditions influencing health status (07/02/2013), Other conditions influencing health status (05/22/2015), Other conditions influencing health status (10/24/2022), Other conditions influencing health status (03/14/2022), Other long term (current) drug therapy (10/24/2022), Overweight with body mass index (BMI) 25.0-29.9 (03/10/2024), Pain of toe (03/10/2024), Personal history of COVID-19 (04/04/2023), Personal history of diseases of the blood and blood-forming organs and certain disorders involving the immune mechanism (07/02/2013), Personal history of diseases of the skin and subcutaneous tissue (08/11/2015), Personal history of nephrotic syndrome (07/02/2013), Personal history of other diseases of the circulatory system (08/27/2013), Personal history of other diseases of the nervous system and sense organs, Personal history of other diseases of the respiratory system, Personal history of other infectious and parasitic diseases (07/02/2013), Personal history of other specified conditions, Personal history of other specified conditions (08/27/2013), Posterior epistaxis (03/10/2024), Puckering of macula, right eye (10/24/2022), Raynaud's syndrome without gangrene (07/02/2013), Sinusitis (03/10/2024), Systemic lupus erythematosus, unspecified (Multi) (07/24/2015), Systemic lupus erythematosus, unspecified (Multi) (07/24/2015), Systemic lupus erythematosus, unspecified (Multi) (07/24/2015), 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) (07/02/2013), Type 2 diabetes  mellitus with mild nonproliferative diabetic retinopathy without macular edema, left eye (Multi) (07/27/2015), Type 2 diabetes mellitus with mild nonproliferative diabetic retinopathy without macular edema, unspecified eye (Multi) (07/24/2015), Type 2 diabetes mellitus with proliferative diabetic retinopathy without macular edema, right eye (Multi) (07/27/2015), Type 2 diabetes mellitus with proliferative diabetic retinopathy without macular edema, unspecified eye (Multi) (07/24/2015), Unspecified acute and subacute iridocyclitis (07/24/2015), and Unspecified open wound, left foot, sequela (07/03/2018).    Surgical History  She has a past surgical history that includes Eye surgery (03/06/2015); Total hip arthroplasty (01/29/2015); Cholecystectomy (01/29/2015); Other surgical history (01/29/2015); Other surgical history (01/29/2015); Ankle surgery (01/29/2015); Foot surgery (01/29/2015); MR angio head wo IV contrast (7/26/2013); MR angio neck wo IV contrast (7/26/2013); CT guided percutaneous biopsy bone deep (5/4/2021); MR angio head wo IV contrast (9/17/2021); MR angio neck wo IV contrast (9/17/2021); MR angio neck wo IV contrast (3/25/2023); and MR angio head wo IV contrast (3/25/2023).     Social History  She reports that she has never smoked. She has never been exposed to tobacco smoke. She has never used smokeless tobacco. She reports that she does not currently use alcohol. She reports that she does not use drugs.     Allergies  Ace inhibitors, Hydroxychloroquine, Lisinopril, Penicillins, and Sulfa (sulfonamide antibiotics)    Review of Systems    Psychiatric ROS - Adult  Anxiety: General Anxiety Disorder (FLAKITO)FLAKITO Behaviors: difficult to control worry  Depression: negative  Delirium: negative  Psychosis: visual hallucinations  Susu: negative  Safety Issues:  self harm thoughts  Psychiatric ROS Comment:     Physical Exam      Mental Status Exam  General: alert and oriented x 2  Appearance: stated  "age  Attitude: cooperative  Behavior: normal  Motor Activity: normal  Speech: normal  Mood: anxious  Affect: denies depression  Thought Process: normal  Thought Content: no delusion or suicide  Thought Perception: VH  Cognition: Intact  Insight: poor  Judgement: poor    Last Recorded Vitals  Blood pressure 124/63, pulse 76, temperature 35.6 °C (96.1 °F), temperature source Temporal, resp. rate 18, height 1.702 m (5' 7\"), weight 90.9 kg (200 lb 6.4 oz), SpO2 94%.    Relevant Results  Results for orders placed or performed during the hospital encounter of 04/24/24 (from the past 96 hour(s))   POCT GLUCOSE   Result Value Ref Range    POCT Glucose 53 (L) 74 - 99 mg/dL   CBC and Auto Differential   Result Value Ref Range    WBC 2.0 (L) 4.4 - 11.3 x10*3/uL    nRBC 0.0 0.0 - 0.0 /100 WBCs    RBC 2.28 (L) 4.00 - 5.20 x10*6/uL    Hemoglobin 7.0 (L) 12.0 - 16.0 g/dL    Hematocrit 23.2 (L) 36.0 - 46.0 %     (H) 80 - 100 fL    MCH 30.7 26.0 - 34.0 pg    MCHC 30.2 (L) 32.0 - 36.0 g/dL    RDW 19.1 (H) 11.5 - 14.5 %    Platelets 59 (L) 150 - 450 x10*3/uL    Neutrophils % 66.0 40.0 - 80.0 %    Immature Granulocytes %, Automated 0.5 0.0 - 0.9 %    Lymphocytes % 28.1 13.0 - 44.0 %    Monocytes % 4.4 2.0 - 10.0 %    Eosinophils % 1.0 0.0 - 6.0 %    Basophils % 0.0 0.0 - 2.0 %    Neutrophils Absolute 1.34 1.20 - 7.70 x10*3/uL    Immature Granulocytes Absolute, Automated 0.01 0.00 - 0.70 x10*3/uL    Lymphocytes Absolute 0.57 (L) 1.20 - 4.80 x10*3/uL    Monocytes Absolute 0.09 (L) 0.10 - 1.00 x10*3/uL    Eosinophils Absolute 0.02 0.00 - 0.70 x10*3/uL    Basophils Absolute 0.00 0.00 - 0.10 x10*3/uL   Comprehensive metabolic panel   Result Value Ref Range    Glucose 52 (LL) 74 - 99 mg/dL    Sodium 147 (H) 136 - 145 mmol/L    Potassium 4.6 3.5 - 5.3 mmol/L    Chloride 118 (H) 98 - 107 mmol/L    Bicarbonate 22 21 - 32 mmol/L    Anion Gap 12 10 - 20 mmol/L    Urea Nitrogen 44 (H) 6 - 23 mg/dL    Creatinine 1.96 (H) 0.50 - 1.05 mg/dL    " eGFR 28 (L) >60 mL/min/1.73m*2    Calcium 8.2 (L) 8.6 - 10.3 mg/dL    Albumin 2.9 (L) 3.4 - 5.0 g/dL    Alkaline Phosphatase 98 33 - 136 U/L    Total Protein 5.4 (L) 6.4 - 8.2 g/dL    AST 20 9 - 39 U/L    Bilirubin, Total 0.3 0.0 - 1.2 mg/dL    ALT 19 7 - 45 U/L   Magnesium   Result Value Ref Range    Magnesium 1.39 (L) 1.60 - 2.40 mg/dL   Urinalysis with Reflex Microscopic   Result Value Ref Range    Color, Urine Yellow Straw, Yellow    Appearance, Urine Hazy (N) Clear    Specific Gravity, Urine 1.014 1.005 - 1.035    pH, Urine 7.0 5.0, 5.5, 6.0, 6.5, 7.0, 7.5, 8.0    Protein, Urine 100 (2+) (N) NEGATIVE mg/dL    Glucose, Urine NEGATIVE NEGATIVE mg/dL    Blood, Urine NEGATIVE NEGATIVE    Ketones, Urine NEGATIVE NEGATIVE mg/dL    Bilirubin, Urine NEGATIVE NEGATIVE    Urobilinogen, Urine <2.0 <2.0 mg/dL    Nitrite, Urine NEGATIVE NEGATIVE    Leukocyte Esterase, Urine LARGE (3+) (A) NEGATIVE   BLOOD GAS VENOUS FULL PANEL   Result Value Ref Range    POCT pH, Venous 7.32 (L) 7.33 - 7.43 pH    POCT pCO2, Venous 42 41 - 51 mm Hg    POCT pO2, Venous 38 35 - 45 mm Hg    POCT SO2, Venous 52 45 - 75 %    POCT Oxy Hemoglobin, Venous 51.1 45.0 - 75.0 %    POCT Hematocrit Calculated, Venous 21.0 (L) 36.0 - 46.0 %    POCT Sodium, Venous 146 (H) 136 - 145 mmol/L    POCT Potassium, Venous 4.8 3.5 - 5.3 mmol/L    POCT Chloride, Venous 120 (H) 98 - 107 mmol/L    POCT Ionized Calicum, Venous 1.25 1.10 - 1.33 mmol/L    POCT Glucose, Venous 51 (LL) 74 - 99 mg/dL    POCT Lactate, Venous 1.0 0.4 - 2.0 mmol/L    POCT Base Excess, Venous -4.2 (L) -2.0 - 3.0 mmol/L    POCT HCO3 Calculated, Venous 21.6 (L) 22.0 - 26.0 mmol/L    POCT Hemoglobin, Venous 7.0 (L) 12.0 - 16.0 g/dL    POCT Anion Gap, Venous 9.0 (L) 10.0 - 25.0 mmol/L    Patient Temperature      FiO2 21 %   Urine Gray Tube   Result Value Ref Range    Extra Tube Hold for add-ons.    Microscopic Only, Urine   Result Value Ref Range    WBC, Urine >50 (A) 1-5, NONE /HPF    RBC, Urine  1-2 NONE, 1-2, 3-5 /HPF    Bacteria, Urine 1+ (A) NONE SEEN /HPF   Hemoglobin A1c   Result Value Ref Range    Hemoglobin A1C 8.4 (H) see below %    Estimated Average Glucose 194 Not Established mg/dL   POCT GLUCOSE   Result Value Ref Range    POCT Glucose 99 74 - 99 mg/dL   Protime-INR   Result Value Ref Range    Protime 11.5 9.8 - 12.8 seconds    INR 1.0 0.9 - 1.1   POCT GLUCOSE   Result Value Ref Range    POCT Glucose 63 (L) 74 - 99 mg/dL   POCT GLUCOSE   Result Value Ref Range    POCT Glucose 61 (L) 74 - 99 mg/dL   POCT GLUCOSE   Result Value Ref Range    POCT Glucose 70 (L) 74 - 99 mg/dL   POCT GLUCOSE   Result Value Ref Range    POCT Glucose 117 (H) 74 - 99 mg/dL   POCT GLUCOSE   Result Value Ref Range    POCT Glucose 106 (H) 74 - 99 mg/dL   POCT GLUCOSE   Result Value Ref Range    POCT Glucose 86 74 - 99 mg/dL   Renal function panel   Result Value Ref Range    Glucose 79 74 - 99 mg/dL    Sodium 143 136 - 145 mmol/L    Potassium 4.8 3.5 - 5.3 mmol/L    Chloride 118 (H) 98 - 107 mmol/L    Bicarbonate 20 (L) 21 - 32 mmol/L    Anion Gap 10 10 - 20 mmol/L    Urea Nitrogen 39 (H) 6 - 23 mg/dL    Creatinine 1.81 (H) 0.50 - 1.05 mg/dL    eGFR 31 (L) >60 mL/min/1.73m*2    Calcium 7.9 (L) 8.6 - 10.3 mg/dL    Phosphorus 3.1 2.5 - 4.9 mg/dL    Albumin 2.6 (L) 3.4 - 5.0 g/dL   POCT GLUCOSE   Result Value Ref Range    POCT Glucose 83 74 - 99 mg/dL   POCT GLUCOSE   Result Value Ref Range    POCT Glucose 59 (L) 74 - 99 mg/dL   POCT GLUCOSE   Result Value Ref Range    POCT Glucose 128 (H) 74 - 99 mg/dL   CBC   Result Value Ref Range    WBC 2.1 (L) 4.4 - 11.3 x10*3/uL    nRBC 1.0 (H) 0.0 - 0.0 /100 WBCs    RBC 2.11 (L) 4.00 - 5.20 x10*6/uL    Hemoglobin 6.4 (LL) 12.0 - 16.0 g/dL    Hematocrit 21.7 (L) 36.0 - 46.0 %     (H) 80 - 100 fL    MCH 30.3 26.0 - 34.0 pg    MCHC 29.5 (L) 32.0 - 36.0 g/dL    RDW 19.2 (H) 11.5 - 14.5 %    Platelets 49 (L) 150 - 450 x10*3/uL   Renal function panel   Result Value Ref Range    Glucose  112 (H) 74 - 99 mg/dL    Sodium 143 136 - 145 mmol/L    Potassium 4.3 3.5 - 5.3 mmol/L    Chloride 116 (H) 98 - 107 mmol/L    Bicarbonate 21 21 - 32 mmol/L    Anion Gap 10 10 - 20 mmol/L    Urea Nitrogen 35 (H) 6 - 23 mg/dL    Creatinine 1.64 (H) 0.50 - 1.05 mg/dL    eGFR 35 (L) >60 mL/min/1.73m*2    Calcium 7.9 (L) 8.6 - 10.3 mg/dL    Phosphorus 2.6 2.5 - 4.9 mg/dL    Albumin 2.4 (L) 3.4 - 5.0 g/dL   Magnesium   Result Value Ref Range    Magnesium 1.60 1.60 - 2.40 mg/dL   Lactate dehydrogenase   Result Value Ref Range     84 - 246 U/L   Reticulocytes   Result Value Ref Range    Retic % 0.4 (L) 0.5 - 2.0 %    Retic Absolute 0.009 (L) 0.018 - 0.083 x10*6/uL    Reticulocyte Hemoglobin 31 28 - 38 pg    Immature Retic fraction 10.7 <=16.0 %   Iron and TIBC   Result Value Ref Range    Iron 153 (H) 35 - 150 ug/dL    UIBC <55 (L) 110 - 370 ug/dL    TIBC      % Saturation     Ferritin   Result Value Ref Range    Ferritin 1,060 (H) 8 - 150 ng/mL   TSH with reflex to Free T4 if abnormal   Result Value Ref Range    Thyroid Stimulating Hormone 2.88 0.44 - 3.98 mIU/L   Folate   Result Value Ref Range    Folate, Serum >24.0 >5.0 ng/mL   Ammonia   Result Value Ref Range    Ammonia 25 16 - 53 umol/L   Lactate   Result Value Ref Range    Lactate 1.4 0.4 - 2.0 mmol/L   C-Peptide   Result Value Ref Range    C-Peptide 2.9 0.7 - 3.9 ng/mL   Lavender Top   Result Value Ref Range    Extra Tube Hold for add-ons.    POCT GLUCOSE   Result Value Ref Range    POCT Glucose 63 (L) 74 - 99 mg/dL   POCT GLUCOSE   Result Value Ref Range    POCT Glucose 112 (H) 74 - 99 mg/dL   Type and screen   Result Value Ref Range    ABO TYPE O     Rh TYPE POS     ANTIBODY SCREEN NEG    Prepare RBC: 1 Units, Leukocytes Reduced (CMV reduced risk)   Result Value Ref Range    PRODUCT CODE [id number]     Unit Number [id number]     Unit ABO O     Unit RH POS     XM INTEP COMP     Dispense Status IS     Blood Expiration Date May 03, 2024 23:59 EDT     PRODUCT  BLOOD TYPE 5100     UNIT VOLUME 350    POCT GLUCOSE   Result Value Ref Range    POCT Glucose 61 (L) 74 - 99 mg/dL   POCT GLUCOSE   Result Value Ref Range    POCT Glucose 131 (H) 74 - 99 mg/dL   Renal function panel   Result Value Ref Range    Glucose 105 (H) 74 - 99 mg/dL    Sodium 144 136 - 145 mmol/L    Potassium 4.4 3.5 - 5.3 mmol/L    Chloride 116 (H) 98 - 107 mmol/L    Bicarbonate 21 21 - 32 mmol/L    Anion Gap 11 10 - 20 mmol/L    Urea Nitrogen 32 (H) 6 - 23 mg/dL    Creatinine 1.62 (H) 0.50 - 1.05 mg/dL    eGFR 36 (L) >60 mL/min/1.73m*2    Calcium 8.3 (L) 8.6 - 10.3 mg/dL    Phosphorus 2.8 2.5 - 4.9 mg/dL    Albumin 2.8 (L) 3.4 - 5.0 g/dL     Bedside Peripheral IV Imaging    Result Date: 4/25/2024  These images are not reportable by radiology and will not be interpreted by  Radiologists.    Lower extremity venous duplex bilateral    Result Date: 4/25/2024            [facility] [place] [address] [place] [address]     Tel [phone] Fax [phone]  Vascular Lab Report  [facility] US LOWER EXTREMITY VENOUS DUPLEX BILATERAL Patient Name:      LISBET ZARAGOZA NENITA      Reading Physician:  [id number] Enzo Ashley MD, RPVI Study Date:        4/25/2024             Ordering Provider:  [id number] HILDA JUAREZ MRN/PID:           [id number]              Fellow: Accession#:        [id number]          Technologist:       Beth Hannah RVT, YVETTEMS Date of Birth/Age: 1960 / 63 years Technologist 2: Gender:            F                     Encounter#:         [id number] Admission Status:  Inpatient             Location Performed: [facility]  Diagnosis/ICD: Other specified soft tissue disorders-M79.89 Indication:    Limb swelling CPT Codes:     69659  Peripheral venous duplex scan for DVT complete  Pertinent History: COPD, Anticoagulation, AAA and LE Edema. Afib.  CONCLUSIONS: Right Lower Venous: No evidence of acute deep vein thrombus visualized in the right lower extremity. Left Lower Venous: No evidence of acute deep vein thrombus visualized in the left lower extremity. Additional Findings: Limited images due to patient's inability to tolerate compressions.  Comparison: Compared with study from 1/15/2024, no significant change.No evidence of DVT.  Imaging & Doppler Findings:  Right                 Compressible Thrombus        Flow Distal External Iliac                None   Spontaneous/Phasic CFV                       Yes        None   Spontaneous/Phasic PFV                       Yes        None FV Proximal               Yes        None   Spontaneous/Phasic FV Mid                    Yes        None FV Distal                 Yes        None Popliteal                 Yes        None   Spontaneous/Phasic Peroneal                  Yes        None PTV                       Yes        None  Left                  Compress Thrombus        Flow Distal External Iliac            None   Spontaneous/Phasic CFV                     Yes      None   Spontaneous/Phasic PFV                     Yes      None FV Proximal             Yes      None   Spontaneous/Phasic FV Mid                  Yes      None FV Distal               Yes      None Popliteal               Yes      None   Spontaneous/Phasic Peroneal                Yes      None PTV                     Yes      None  [id number] Enzo Ashley MD, RPVI Electronically signed by [id number] Enzo Ashley MD, RPVI on 4/25/2024 at 1:07:18 PM  ** Final **     XR chest 1 view    Result Date: 4/24/2024  Interpreted By:  Mitesh Khan, STUDY: XR CHEST 1 VIEW;  4/24/2024 5:41 pm   INDICATION: Signs/Symptoms:cough.   COMPARISON: 03/19/2024   ACCESSION NUMBER(S): [id number]   ORDERING CLINICIAN: ENEIDA GRIER   FINDINGS:          CARDIOMEDIASTINAL SILHOUETTE: Cardiomediastinal silhouette is enlarged.   LUNGS: There is interval appearance of a right upper lobe patchy airspace disease concerning for pneumonia. The left lung is clear. There is no effusion   ABDOMEN: No remarkable upper abdominal findings.   BONES: No acute osseous changes.       1.  New right upper lobe airspace disease compatible with pneumonia in the proper clinical setting. Radiographic follow-up to resolution in 3-4 weeks is advised.       MACRO: None   Signed by: Mitesh Khan 4/24/2024 5:50 PM Dictation workstation:   [id number]         Assessment/Plan   Delirium  Steroid induced psychosis       Pt will benefit from low dose of zyprexa 2.5 mg po bid to control her confusion and psychosis, for 3 days  Will continue to monitor reg suicidal ideation and risk  Maintain sitter    Medication Consent  Medication Consent:     Yaniv Lee MD

## 2024-04-25 NOTE — CARE PLAN
The patient's goals for the shift include  to be compliant with medication and care    The clinical goals for the shift include pt vitals and labs will maintain normal thru shift

## 2024-04-25 NOTE — PROGRESS NOTES
6/14/23 addendum: Received a [employer] message response from pt confirming CP visit next week for additional bivalent Moderna dose.     Community Paramedic Program  Community Health Worker Outreach    UTC/Voicemail    Outreach attempted x 1.      Left message on patient's voicemail with call back information and requested return call.    CHW Follow-up Plan: will try to reach patient again in 1-2 business days.    Note: Available with CP1 on 6/20 at 9:30-10 am? Sending pt a Favoe msg as well.     Referred for: additional bivalent dose    Date and type of last vaccine/booster: 12/9/21, first monovalent dose from initial Moderna series    Signed order for Moderna additional bivalent dose in chart?: yes (5/23/23)      Magalis Lutz  Community Health Worker  Community Paramedic program  [phone]  [email or web address]       Bing Holliday is a 63 y.o. female on day 0 of admission presenting with Hypoglycemia.      Subjective   Seen and examined at bedside. (See significant event note).       Objective     Last Recorded Vitals  /54 (BP Location: Left arm, Patient Position: Lying)   Pulse 72   Temp 35.3 °C (95.5 °F) (Temporal)   Resp 16   Wt 90.9 kg (200 lb 6.4 oz)   SpO2 94%   Intake/Output last 3 Shifts:    Intake/Output Summary (Last 24 hours) at 4/25/2024 1114  Last data filed at 4/25/2024 1100  Gross per 24 hour   Intake 3032.5 ml   Output 400 ml   Net 2632.5 ml       Admission Weight  Weight: 90.9 kg (200 lb 6.4 oz) (04/24/24 0953)    Daily Weight  04/24/24 : 90.9 kg (200 lb 6.4 oz)    Image Results  XR chest 1 view  Narrative: Interpreted By:  Mitesh Khan,   STUDY:  XR CHEST 1 VIEW;  4/24/2024 5:41 pm      INDICATION:  Signs/Symptoms:cough.      COMPARISON:  03/19/2024      ACCESSION NUMBER(S):  [id number]      ORDERING CLINICIAN:  ENEIDA GRIER      FINDINGS:                  CARDIOMEDIASTINAL SILHOUETTE:  Cardiomediastinal silhouette is enlarged.      LUNGS:  There is interval appearance of a right upper lobe patchy airspace  disease concerning for pneumonia. The left lung is clear. There is no  effusion      ABDOMEN:  No remarkable upper abdominal findings.      BONES:  No acute osseous changes.      Impression: 1.  New right upper lobe airspace disease compatible with pneumonia  in the proper clinical setting. Radiographic follow-up to resolution  in 3-4 weeks is advised.              MACRO:  None      Signed by: Mitesh Khan 4/24/2024 5:50 PM  Dictation workstation:   [id number]      Physical Exam  General: Not in acute distress, A&O x 2, alert, uncooperative, well-developed, patient endorsing diffuse tenderness to feather light palpation over entirety of body, but no tenderness to palpation when patient is distracted.  HEENT: Normocephalic, atraumatic, EOMI, moist mucous membranes  Neck: Neck supple,  trachea midline, no evidence of trauma  Cardiovascular: RRR, S1 and S2 appreciated, no murmurs rubs gallops appreciated, distal pulses 2+ bilaterally (radial and dorsalis pedis), diffusely tender to palpation of chest wall, even when stethoscope placed on chest wall to listen to heart sounds and lung sounds  Respiratory: Vesicular breath sounds appreciated bilaterally, no adventitious sounds appreciated, no increased work of breathing on room air  GI: Abdomen soft, nondistended, diffusely tender to palpation including when stethoscope placed on stomach, bowel sounds present  Extremities: 3+ non-pitting edema LLE 2+ pitting edema RLE  Neuro: A&O X2, no focal deficits, strength and sensation intact bilaterally, moving all extremities, withdrawing to pain which is diffuse and throughout with extremely light palpation.  Skin: Warm and dry, without lesions or rashes  Relevant Results    Results for orders placed or performed during the hospital encounter of 04/24/24 (from the past 24 hour(s))   POCT GLUCOSE   Result Value Ref Range    POCT Glucose 63 (L) 74 - 99 mg/dL   POCT GLUCOSE   Result Value Ref Range    POCT Glucose 61 (L) 74 - 99 mg/dL   POCT GLUCOSE   Result Value Ref Range    POCT Glucose 70 (L) 74 - 99 mg/dL   POCT GLUCOSE   Result Value Ref Range    POCT Glucose 117 (H) 74 - 99 mg/dL   POCT GLUCOSE   Result Value Ref Range    POCT Glucose 106 (H) 74 - 99 mg/dL   POCT GLUCOSE   Result Value Ref Range    POCT Glucose 86 74 - 99 mg/dL   Renal function panel   Result Value Ref Range    Glucose 79 74 - 99 mg/dL    Sodium 143 136 - 145 mmol/L    Potassium 4.8 3.5 - 5.3 mmol/L    Chloride 118 (H) 98 - 107 mmol/L    Bicarbonate 20 (L) 21 - 32 mmol/L    Anion Gap 10 10 - 20 mmol/L    Urea Nitrogen 39 (H) 6 - 23 mg/dL    Creatinine 1.81 (H) 0.50 - 1.05 mg/dL    eGFR 31 (L) >60 mL/min/1.73m*2    Calcium 7.9 (L) 8.6 - 10.3 mg/dL    Phosphorus 3.1 2.5 - 4.9 mg/dL    Albumin 2.6 (L) 3.4 - 5.0 g/dL   POCT GLUCOSE   Result  Value Ref Range    POCT Glucose 83 74 - 99 mg/dL   POCT GLUCOSE   Result Value Ref Range    POCT Glucose 59 (L) 74 - 99 mg/dL   POCT GLUCOSE   Result Value Ref Range    POCT Glucose 128 (H) 74 - 99 mg/dL   CBC   Result Value Ref Range    WBC 2.1 (L) 4.4 - 11.3 x10*3/uL    nRBC 1.0 (H) 0.0 - 0.0 /100 WBCs    RBC 2.11 (L) 4.00 - 5.20 x10*6/uL    Hemoglobin 6.4 (LL) 12.0 - 16.0 g/dL    Hematocrit 21.7 (L) 36.0 - 46.0 %     (H) 80 - 100 fL    MCH 30.3 26.0 - 34.0 pg    MCHC 29.5 (L) 32.0 - 36.0 g/dL    RDW 19.2 (H) 11.5 - 14.5 %    Platelets 49 (L) 150 - 450 x10*3/uL   Renal function panel   Result Value Ref Range    Glucose 112 (H) 74 - 99 mg/dL    Sodium 143 136 - 145 mmol/L    Potassium 4.3 3.5 - 5.3 mmol/L    Chloride 116 (H) 98 - 107 mmol/L    Bicarbonate 21 21 - 32 mmol/L    Anion Gap 10 10 - 20 mmol/L    Urea Nitrogen 35 (H) 6 - 23 mg/dL    Creatinine 1.64 (H) 0.50 - 1.05 mg/dL    eGFR 35 (L) >60 mL/min/1.73m*2    Calcium 7.9 (L) 8.6 - 10.3 mg/dL    Phosphorus 2.6 2.5 - 4.9 mg/dL    Albumin 2.4 (L) 3.4 - 5.0 g/dL   Magnesium   Result Value Ref Range    Magnesium 1.60 1.60 - 2.40 mg/dL   Lactate dehydrogenase   Result Value Ref Range     84 - 246 U/L   Reticulocytes   Result Value Ref Range    Retic % 0.4 (L) 0.5 - 2.0 %    Retic Absolute 0.009 (L) 0.018 - 0.083 x10*6/uL    Reticulocyte Hemoglobin 31 28 - 38 pg    Immature Retic fraction 10.7 <=16.0 %   Iron and TIBC   Result Value Ref Range    Iron 153 (H) 35 - 150 ug/dL    UIBC <55 (L) 110 - 370 ug/dL    TIBC      % Saturation     Ferritin   Result Value Ref Range    Ferritin 1,060 (H) 8 - 150 ng/mL   Ammonia   Result Value Ref Range    Ammonia 25 16 - 53 umol/L   Lactate   Result Value Ref Range    Lactate 1.4 0.4 - 2.0 mmol/L   Lavender Top   Result Value Ref Range    Extra Tube Hold for add-ons.    POCT GLUCOSE   Result Value Ref Range    POCT Glucose 63 (L) 74 - 99 mg/dL   POCT GLUCOSE   Result Value Ref Range    POCT Glucose 112 (H) 74 - 99  mg/dL   Type and screen   Result Value Ref Range    ABO TYPE O     Rh TYPE POS     ANTIBODY SCREEN NEG    Prepare RBC: 1 Units, Leukocytes Reduced (CMV reduced risk)   Result Value Ref Range    PRODUCT CODE B1949N42     Unit Number [id number]     Unit ABO O     Unit RH POS     XM INTEP COMP     Dispense Status XM     Blood Expiration Date May 03, 2024 23:59 EDT     PRODUCT BLOOD TYPE 5100     UNIT VOLUME 350       XR chest 1 view    Result Date: 4/24/2024  Interpreted By:  Mitesh Khan, STUDY: XR CHEST 1 VIEW;  4/24/2024 5:41 pm   INDICATION: Signs/Symptoms:cough.   COMPARISON: 03/19/2024   ACCESSION NUMBER(S): [id number]   ORDERING CLINICIAN: ENEIDA GRIER   FINDINGS:         CARDIOMEDIASTINAL SILHOUETTE: Cardiomediastinal silhouette is enlarged.   LUNGS: There is interval appearance of a right upper lobe patchy airspace disease concerning for pneumonia. The left lung is clear. There is no effusion   ABDOMEN: No remarkable upper abdominal findings.   BONES: No acute osseous changes.       1.  New right upper lobe airspace disease compatible with pneumonia in the proper clinical setting. Radiographic follow-up to resolution in 3-4 weeks is advised.       MACRO: None   Signed by: Mitesh Khan 4/24/2024 5:50 PM Dictation workstation:   [id number]      Assessment/Plan                  Principal Problem:    Hypoglycemia    1.  Hypoglycemia in the setting of IDDM2, 2/2 chronic steroid use: Patient has a known history of hypoglycemia with multitudinous hospital presentations  Secondary AI  AI exacerbation?  Patient presents to [facility] ED for chief complaint of hypoglycemia this is in the setting of decreased oral intake and still receiving her insulin at nursing facility.  -S/p 2 A of D50 1 en route and 1 in EMS, s/p D10 infusion in the emergency department, s/p glucagon IM at facility  -Most recent glucose 117  -Continue hypoglycemia protocol  -Hold home insulin  -S/p 50 mg Solu-Cortef  -Continue Prednisone  20mg daily  -Encourage p.o. intake  - D5-LR due to patient refusing PO intake  - Q4 accuchecks   - Start hydrocortisone 25 mg IV q6 due to patient's acute illness and refusal to take PO meds    2. Suicidality vs encephalopathy   L antecubital fossa injury-self inflicted  -1:1 sitter  -Psychiatry consulted appreciate recs  - CT L elbow to evaluate for septic arthritis, no erythema or joint swelling but very tender to palptation     3. 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 Atrial fibrillation on Eliquis  -Continue home Eliquis 2.5 mg twice daily  -Electrolytes optimized     5.HFrEF with Lower extremity swelling, (chronic)  TTE 3/10/2024: LVEF 40 to 45% and low normal RV systolic function.  Mild/moderate MR.  Moderately elevated RVSP of 49.5, global hypokinesis of left ventricle with minor regional variations   -Continue nifedipine  -Continue home atorvastatin 40 mg     6. Seizure Hx:  - Continue home Keppra 500 mg twice daily     7. COPD not in exacerbation with cough  Possible Pneumonia  UTI?  Vague abdominal pain  - DuoNebs every 6 hours prn  -CXR obtained showed new RUL opacity  -Urinalysis consistent with UTI, and patient is not a reliable historian    - Start ceftriaxone 2 grams daily    8.  GERD:  -Continue home pantoprazole 40 mg     9 Hx transient AMS with hallucinations:  -Continue home Risperdal     IVF: LR-D5W at 75 cc/HR  Diet: Diabetic  DVT prophylaxis: Continue home Eliquis  Dispo: Anticipate 24 hours hospital stay for resolution of hypoglycemia  Consults: None  CODE STATUS:  Assume full code              Chaka TESSY Figueroa, DO

## 2024-04-25 NOTE — PROGRESS NOTES
Per nursing report, patient admitted self harm and now has psychiatry consult. TCC to follow for plan.

## 2024-04-25 NOTE — SIGNIFICANT EVENT
"Significant Event    Upon my evaluation of Ms. Holliday this AM, she continued to complain of pain.  Upon interviewing her on the specifics of her pain, she noted her L arm hurt severely, mostly int he antecubital fossa.  I asked her what happened to her arm, she said \"I hurt my arm.\" I again asked her how it happened, and she became tearful and said \"I hurt my arm.\"  Asked if someone hurt her arm, and she denied it, and asked her if she purposely hurt her arm and she said yes, and became real tearful again.  Asked if she feels safe at her facility and if anyone has harmed her and she denied it.  Also denies being depressed, any further thoughts of self harm, and denies SI/HI.  When asked how she specifically harmed her L arm, she would not say and became very tearful.  Offered her medications for pain, and blood transfusion for HgB < 7.0 she refused them, and any other treatment.  Asked her about if she would want to discuss palliative care/hospice since she is not wanting treatment, and stated no, she did not want to die.  She notes she did it because she was in a lot of pain.  At this time 1:1 sitter was ordered,  suicide precautions, ordered, and psychiatry consult.  Attempted to notify son, but unsuccessful.    Chaka Figueroa, DO  PGY3 Internal Medicine   "

## 2024-04-25 NOTE — CARE PLAN
The patient's goals for the shift include    Problem: Nutrition  Goal: Less than 5 days NPO/clear liquids  Outcome: Progressing  Goal: Oral intake greater than 50%  Outcome: Progressing  Goal: Oral intake greater 75%  Outcome: Progressing  Goal: Consume prescribed supplement  Outcome: Progressing  Goal: Adequate PO fluid intake  Outcome: Progressing  Goal: Nutrition support goals are met within 48 hrs  Outcome: Progressing  Goal: Nutrition support is meeting 75% of nutrient needs  Outcome: Progressing  Goal: Tube feed tolerance  Outcome: Progressing  Goal: BG  mg/dL  Outcome: Progressing  Goal: Lab values WNL  Outcome: Progressing  Goal: Electrolytes WNL  Outcome: Progressing  Goal: Promote healing  Outcome: Progressing  Goal: Maintain stable weight  Outcome: Progressing  Goal: Reduce weight from edema/fluid  Outcome: Progressing  Goal: Gradual weight gain  Outcome: Progressing  Goal: Improve ostomy output  Outcome: Progressing     Problem: Pain - Adult  Goal: Verbalizes/displays adequate comfort level or baseline comfort level  Outcome: Progressing     Problem: Safety - Adult  Goal: Free from fall injury  Outcome: Progressing     Problem: Discharge Planning  Goal: Discharge to home or other facility with appropriate resources  Outcome: Progressing     Problem: Chronic Conditions and Co-morbidities  Goal: Patient's chronic conditions and co-morbidity symptoms are monitored and maintained or improved  Outcome: Progressing     Problem: Diabetes  Goal: Achieve decreasing blood glucose levels by end of shift  Outcome: Progressing  Goal: Increase stability of blood glucose readings by end of shift  Outcome: Progressing  Goal: Decrease in ketones present in urine by end of shift  Outcome: Progressing  Goal: Maintain electrolyte levels within acceptable range throughout shift  Outcome: Progressing  Goal: Maintain glucose levels >70mg/dl to <250mg/dl throughout shift  Outcome: Progressing  Goal: No changes in  neurological exam by end of shift  Outcome: Progressing  Goal: Learn about and adhere to nutrition recommendations by end of shift  Outcome: Progressing  Goal: Vital signs within normal range for age by end of shift  Outcome: Progressing  Goal: Increase self care and/or family involovement by end of shift  Outcome: Progressing  Goal: Receive DSME education by end of shift  Outcome: Progressing     Problem: Skin  Goal: Decreased wound size/increased tissue granulation at next dressing change  Outcome: Progressing  Flowsheets (Taken 4/25/2024 1104)  Decreased wound size/increased tissue granulation at next dressing change: Promote sleep for wound healing  Goal: Participates in plan/prevention/treatment measures  Outcome: Progressing  Flowsheets (Taken 4/25/2024 1104)  Participates in plan/prevention/treatment measures:   Discuss with provider PT/OT consult   Elevate heels  Goal: Prevent/manage excess moisture  Outcome: Progressing  Flowsheets (Taken 4/25/2024 1104)  Prevent/manage excess moisture:   Moisturize dry skin   Cleanse incontinence/protect with barrier cream  Goal: Prevent/minimize sheer/friction injuries  Outcome: Progressing  Flowsheets (Taken 4/25/2024 1104)  Prevent/minimize sheer/friction injuries:   Turn/reposition every 2 hours/use positioning/transfer devices   Complete micro-shifts as needed if patient unable. Adjust patient position to relieve pressure points, not a full turn   HOB 30 degrees or less  Goal: Promote/optimize nutrition  Outcome: Progressing  Flowsheets (Taken 4/25/2024 1104)  Promote/optimize nutrition:   Assist with feeding   Monitor/record intake including meals  Goal: Promote skin healing  Outcome: Progressing  Flowsheets (Taken 4/25/2024 1104)  Promote skin healing: Turn/reposition every 2 hours/use positioning/transfer devices       The clinical goals for the shift include maintain blood glucose >75 the remainder of this shift

## 2024-04-26 ENCOUNTER — APPOINTMENT (OUTPATIENT)
Dept: CARDIOLOGY | Facility: HOSPITAL | Age: 64
DRG: 637 | End: 2024-04-26
Payer: MEDICARE

## 2024-04-26 ENCOUNTER — APPOINTMENT (OUTPATIENT)
Dept: RADIOLOGY | Facility: HOSPITAL | Age: 64
DRG: 637 | End: 2024-04-26
Payer: MEDICARE

## 2024-04-26 ENCOUNTER — APPOINTMENT (OUTPATIENT)
Dept: INFUSION THERAPY | Facility: CLINIC | Age: 64
End: 2024-04-26
Payer: MEDICARE

## 2024-04-26 LAB
ALBUMIN SERPL BCP-MCNC: 2.7 G/DL (ref 3.4–5)
ANION GAP SERPL CALC-SCNC: 12 MMOL/L (ref 10–20)
BASOPHILS # BLD AUTO: 0 X10*3/UL (ref 0–0.1)
BASOPHILS NFR BLD AUTO: 0 %
BUN SERPL-MCNC: 29 MG/DL (ref 6–23)
BURR CELLS BLD QL SMEAR: NORMAL
CALCIUM SERPL-MCNC: 8.4 MG/DL (ref 8.6–10.3)
CHLORIDE SERPL-SCNC: 118 MMOL/L (ref 98–107)
CO2 SERPL-SCNC: 21 MMOL/L (ref 21–32)
CREAT SERPL-MCNC: 1.76 MG/DL (ref 0.5–1.05)
CRP SERPL-MCNC: 7.29 MG/DL
EGFRCR SERPLBLD CKD-EPI 2021: 32 ML/MIN/1.73M*2
EOSINOPHIL # BLD AUTO: 0.01 X10*3/UL (ref 0–0.7)
EOSINOPHIL NFR BLD AUTO: 0.3 %
ERYTHROCYTE [DISTWIDTH] IN BLOOD BY AUTOMATED COUNT: 20.1 % (ref 11.5–14.5)
ERYTHROCYTE [SEDIMENTATION RATE] IN BLOOD BY WESTERGREN METHOD: 41 MM/H (ref 0–30)
FLUAV RNA RESP QL NAA+PROBE: NOT DETECTED
FLUBV RNA RESP QL NAA+PROBE: NOT DETECTED
GLUCOSE BLD MANUAL STRIP-MCNC: 72 MG/DL (ref 74–99)
GLUCOSE BLD MANUAL STRIP-MCNC: 82 MG/DL (ref 74–99)
GLUCOSE BLD MANUAL STRIP-MCNC: 85 MG/DL (ref 74–99)
GLUCOSE BLD MANUAL STRIP-MCNC: 86 MG/DL (ref 74–99)
GLUCOSE BLD MANUAL STRIP-MCNC: 87 MG/DL (ref 74–99)
GLUCOSE SERPL-MCNC: 76 MG/DL (ref 74–99)
HCT VFR BLD AUTO: 23.7 % (ref 36–46)
HGB BLD-MCNC: 7.3 G/DL (ref 12–16)
IMM GRANULOCYTES # BLD AUTO: 0.04 X10*3/UL (ref 0–0.7)
IMM GRANULOCYTES NFR BLD AUTO: 1.3 % (ref 0–0.9)
LYMPHOCYTES # BLD AUTO: 0.87 X10*3/UL (ref 1.2–4.8)
LYMPHOCYTES NFR BLD AUTO: 28 %
MAGNESIUM SERPL-MCNC: 2.15 MG/DL (ref 1.6–2.4)
MCH RBC QN AUTO: 29.7 PG (ref 26–34)
MCHC RBC AUTO-ENTMCNC: 30.8 G/DL (ref 32–36)
MCV RBC AUTO: 96 FL (ref 80–100)
MONOCYTES # BLD AUTO: 0.25 X10*3/UL (ref 0.1–1)
MONOCYTES NFR BLD AUTO: 8 %
NEUTROPHILS # BLD AUTO: 1.94 X10*3/UL (ref 1.2–7.7)
NEUTROPHILS NFR BLD AUTO: 62.4 %
NRBC BLD-RTO: 0.6 /100 WBCS (ref 0–0)
OVALOCYTES BLD QL SMEAR: NORMAL
PHOSPHATE SERPL-MCNC: 3.3 MG/DL (ref 2.5–4.9)
PLATELET # BLD AUTO: 62 X10*3/UL (ref 150–450)
POTASSIUM SERPL-SCNC: 5 MMOL/L (ref 3.5–5.3)
RBC # BLD AUTO: 2.46 X10*6/UL (ref 4–5.2)
RBC MORPH BLD: NORMAL
RSV RNA RESP QL NAA+PROBE: NOT DETECTED
SARS-COV-2 RNA RESP QL NAA+PROBE: NOT DETECTED
SODIUM SERPL-SCNC: 146 MMOL/L (ref 136–145)
WBC # BLD AUTO: 3.1 X10*3/UL (ref 4.4–11.3)

## 2024-04-26 PROCEDURE — 2500000006 HC RX 250 W HCPCS SELF ADMINISTERED DRUGS (ALT 637 FOR ALL PAYERS): Mod: MUE | Performed by: PSYCHIATRY & NEUROLOGY

## 2024-04-26 PROCEDURE — 85025 COMPLETE CBC W/AUTO DIFF WBC: CPT

## 2024-04-26 PROCEDURE — 93010 ELECTROCARDIOGRAM REPORT: CPT | Performed by: INTERNAL MEDICINE

## 2024-04-26 PROCEDURE — 85652 RBC SED RATE AUTOMATED: CPT

## 2024-04-26 PROCEDURE — 2500000004 HC RX 250 GENERAL PHARMACY W/ HCPCS (ALT 636 FOR OP/ED)

## 2024-04-26 PROCEDURE — 87186 SC STD MICRODIL/AGAR DIL: CPT | Mod: STJLAB

## 2024-04-26 PROCEDURE — 2500000004 HC RX 250 GENERAL PHARMACY W/ HCPCS (ALT 636 FOR OP/ED): Performed by: INTERNAL MEDICINE

## 2024-04-26 PROCEDURE — 82947 ASSAY GLUCOSE BLOOD QUANT: CPT

## 2024-04-26 PROCEDURE — 87899 AGENT NOS ASSAY W/OPTIC: CPT | Mod: STJLAB

## 2024-04-26 PROCEDURE — 86140 C-REACTIVE PROTEIN: CPT

## 2024-04-26 PROCEDURE — 1200000002 HC GENERAL ROOM WITH TELEMETRY DAILY

## 2024-04-26 PROCEDURE — 83735 ASSAY OF MAGNESIUM: CPT

## 2024-04-26 PROCEDURE — 70450 CT HEAD/BRAIN W/O DYE: CPT | Performed by: RADIOLOGY

## 2024-04-26 PROCEDURE — 82947 ASSAY GLUCOSE BLOOD QUANT: CPT | Mod: 91

## 2024-04-26 PROCEDURE — 93005 ELECTROCARDIOGRAM TRACING: CPT

## 2024-04-26 PROCEDURE — 70450 CT HEAD/BRAIN W/O DYE: CPT

## 2024-04-26 PROCEDURE — 99232 SBSQ HOSP IP/OBS MODERATE 35: CPT | Performed by: PSYCHIATRY & NEUROLOGY

## 2024-04-26 PROCEDURE — 73080 X-RAY EXAM OF ELBOW: CPT | Mod: LT

## 2024-04-26 PROCEDURE — 2500000001 HC RX 250 WO HCPCS SELF ADMINISTERED DRUGS (ALT 637 FOR MEDICARE OP)

## 2024-04-26 PROCEDURE — 87449 NOS EACH ORGANISM AG IA: CPT | Mod: STJLAB

## 2024-04-26 PROCEDURE — 84100 ASSAY OF PHOSPHORUS: CPT

## 2024-04-26 PROCEDURE — 36415 COLL VENOUS BLD VENIPUNCTURE: CPT

## 2024-04-26 PROCEDURE — 99233 SBSQ HOSP IP/OBS HIGH 50: CPT

## 2024-04-26 PROCEDURE — 87637 SARSCOV2&INF A&B&RSV AMP PRB: CPT

## 2024-04-26 PROCEDURE — 73080 X-RAY EXAM OF ELBOW: CPT | Mod: LEFT SIDE | Performed by: STUDENT IN AN ORGANIZED HEALTH CARE EDUCATION/TRAINING PROGRAM

## 2024-04-26 PROCEDURE — C9113 INJ PANTOPRAZOLE SODIUM, VIA: HCPCS

## 2024-04-26 RX ORDER — OLANZAPINE 5 MG/1
5 TABLET ORAL 2 TIMES DAILY
Status: DISCONTINUED | OUTPATIENT
Start: 2024-04-26 | End: 2024-04-30 | Stop reason: HOSPADM

## 2024-04-26 RX ORDER — DEXTROSE, SODIUM CHLORIDE, SODIUM LACTATE, POTASSIUM CHLORIDE, AND CALCIUM CHLORIDE 5; .6; .31; .03; .02 G/100ML; G/100ML; G/100ML; G/100ML; G/100ML
75 INJECTION, SOLUTION INTRAVENOUS CONTINUOUS
Status: DISCONTINUED | OUTPATIENT
Start: 2024-04-26 | End: 2024-04-26

## 2024-04-26 RX ORDER — DEXTROSE MONOHYDRATE 100 MG/ML
75 INJECTION, SOLUTION INTRAVENOUS CONTINUOUS
Status: ACTIVE | OUTPATIENT
Start: 2024-04-26 | End: 2024-04-27

## 2024-04-26 RX ADMIN — APIXABAN 2.5 MG: 2.5 TABLET, FILM COATED ORAL at 20:43

## 2024-04-26 RX ADMIN — OLANZAPINE 2.5 MG: 5 TABLET, FILM COATED ORAL at 08:44

## 2024-04-26 RX ADMIN — SODIUM CHLORIDE, SODIUM LACTATE, POTASSIUM CHLORIDE, CALCIUM CHLORIDE AND DEXTROSE MONOHYDRATE 75 ML/HR: 5; 600; 310; 30; 20 INJECTION, SOLUTION INTRAVENOUS at 03:52

## 2024-04-26 RX ADMIN — LEVETIRACETAM 500 MG: 5 INJECTION INTRAVENOUS at 17:25

## 2024-04-26 RX ADMIN — ATORVASTATIN CALCIUM 40 MG: 40 TABLET, FILM COATED ORAL at 08:44

## 2024-04-26 RX ADMIN — METRONIDAZOLE 500 MG: 500 INJECTION, SOLUTION INTRAVENOUS at 17:36

## 2024-04-26 RX ADMIN — HYDROCORTISONE SODIUM SUCCINATE 25 MG: 100 INJECTION, POWDER, FOR SOLUTION INTRAMUSCULAR; INTRAVENOUS at 05:20

## 2024-04-26 RX ADMIN — MYCOPHENOLATE MOFETIL 1000 MG: 250 CAPSULE ORAL at 08:49

## 2024-04-26 RX ADMIN — METRONIDAZOLE 500 MG: 500 INJECTION, SOLUTION INTRAVENOUS at 03:58

## 2024-04-26 RX ADMIN — CEFTRIAXONE SODIUM 2 G: 2 INJECTION, SOLUTION INTRAVENOUS at 23:17

## 2024-04-26 RX ADMIN — HYDROCORTISONE SODIUM SUCCINATE 20 MG: 100 INJECTION, POWDER, FOR SOLUTION INTRAMUSCULAR; INTRAVENOUS at 17:25

## 2024-04-26 RX ADMIN — LEVETIRACETAM 500 MG: 5 INJECTION INTRAVENOUS at 05:20

## 2024-04-26 RX ADMIN — RISPERIDONE 0.5 MG: 0.5 TABLET ORAL at 20:43

## 2024-04-26 RX ADMIN — THIAMINE HCL TAB 100 MG 100 MG: 100 TAB at 08:44

## 2024-04-26 RX ADMIN — Medication 5 MG: at 20:43

## 2024-04-26 RX ADMIN — METRONIDAZOLE 500 MG: 500 INJECTION, SOLUTION INTRAVENOUS at 10:18

## 2024-04-26 RX ADMIN — APIXABAN 2.5 MG: 2.5 TABLET, FILM COATED ORAL at 08:44

## 2024-04-26 RX ADMIN — PANTOPRAZOLE SODIUM 40 MG: 40 INJECTION, POWDER, FOR SOLUTION INTRAVENOUS at 08:44

## 2024-04-26 RX ADMIN — FAMOTIDINE 20 MG: 10 INJECTION, SOLUTION INTRAVENOUS at 08:45

## 2024-04-26 RX ADMIN — CEFTRIAXONE SODIUM 2 G: 2 INJECTION, SOLUTION INTRAVENOUS at 00:02

## 2024-04-26 RX ADMIN — OLANZAPINE 5 MG: 5 TABLET, FILM COATED ORAL at 20:44

## 2024-04-26 RX ADMIN — DEXTROSE MONOHYDRATE 75 ML/HR: 100 INJECTION, SOLUTION INTRAVENOUS at 17:29

## 2024-04-26 RX ADMIN — FOLIC ACID 1 MG: 1 TABLET ORAL at 08:44

## 2024-04-26 RX ADMIN — Medication 1 TABLET: at 08:44

## 2024-04-26 ASSESSMENT — COGNITIVE AND FUNCTIONAL STATUS - GENERAL
HELP NEEDED FOR BATHING: A LOT
DAILY ACTIVITIY SCORE: 12
EATING MEALS: A LOT
PERSONAL GROOMING: A LOT
MOVING TO AND FROM BED TO CHAIR: A LOT
DRESSING REGULAR LOWER BODY CLOTHING: A LOT
TOILETING: A LOT
CLIMB 3 TO 5 STEPS WITH RAILING: TOTAL
WALKING IN HOSPITAL ROOM: A LOT
DRESSING REGULAR UPPER BODY CLOTHING: A LOT
MOVING FROM LYING ON BACK TO SITTING ON SIDE OF FLAT BED WITH BEDRAILS: A LOT
TURNING FROM BACK TO SIDE WHILE IN FLAT BAD: A LOT
STANDING UP FROM CHAIR USING ARMS: A LOT
MOBILITY SCORE: 11

## 2024-04-26 ASSESSMENT — PAIN - FUNCTIONAL ASSESSMENT
PAIN_FUNCTIONAL_ASSESSMENT: 0-10
PAIN_FUNCTIONAL_ASSESSMENT: 0-10

## 2024-04-26 ASSESSMENT — PAIN SCALES - GENERAL
PAINLEVEL_OUTOF10: 0 - NO PAIN
PAINLEVEL_OUTOF10: 0 - NO PAIN

## 2024-04-26 NOTE — CARE PLAN
Problem: Skin  Goal: Decreased wound size/increased tissue granulation at next dressing change  Outcome: Progressing  Goal: Participates in plan/prevention/treatment measures  Outcome: Progressing  Goal: Prevent/manage excess moisture  Outcome: Progressing  Goal: Prevent/minimize sheer/friction injuries  Outcome: Progressing  Goal: Promote/optimize nutrition  Outcome: Progressing  Goal: Promote skin healing  Outcome: Progressing

## 2024-04-26 NOTE — CARE PLAN
The patient's goals for the shift include      The clinical goals for the shift include Pt will remain free from falls this shift      Problem: Nutrition  Goal: Less than 5 days NPO/clear liquids  Outcome: Progressing  Goal: Oral intake greater than 50%  Outcome: Progressing  Goal: Oral intake greater 75%  Outcome: Progressing  Goal: Consume prescribed supplement  Outcome: Progressing  Goal: Adequate PO fluid intake  Outcome: Progressing  Goal: Nutrition support goals are met within 48 hrs  Outcome: Progressing  Goal: Nutrition support is meeting 75% of nutrient needs  Outcome: Progressing  Goal: Tube feed tolerance  Outcome: Progressing  Goal: BG  mg/dL  Outcome: Progressing  Goal: Lab values WNL  Outcome: Progressing  Goal: Electrolytes WNL  Outcome: Progressing  Goal: Promote healing  Outcome: Progressing  Goal: Maintain stable weight  Outcome: Progressing  Goal: Reduce weight from edema/fluid  Outcome: Progressing  Goal: Gradual weight gain  Outcome: Progressing  Goal: Improve ostomy output  Outcome: Progressing     Problem: Pain - Adult  Goal: Verbalizes/displays adequate comfort level or baseline comfort level  Outcome: Progressing     Problem: Safety - Adult  Goal: Free from fall injury  Outcome: Progressing     Problem: Discharge Planning  Goal: Discharge to home or other facility with appropriate resources  Outcome: Progressing     Problem: Chronic Conditions and Co-morbidities  Goal: Patient's chronic conditions and co-morbidity symptoms are monitored and maintained or improved  Outcome: Progressing     Problem: Diabetes  Goal: Achieve decreasing blood glucose levels by end of shift  Outcome: Progressing  Goal: Increase stability of blood glucose readings by end of shift  Outcome: Progressing  Goal: Decrease in ketones present in urine by end of shift  Outcome: Progressing  Goal: Maintain electrolyte levels within acceptable range throughout shift  Outcome: Progressing  Goal: Maintain glucose levels  >70mg/dl to <250mg/dl throughout shift  Outcome: Progressing  Goal: No changes in neurological exam by end of shift  Outcome: Progressing  Goal: Learn about and adhere to nutrition recommendations by end of shift  Outcome: Progressing  Goal: Vital signs within normal range for age by end of shift  Outcome: Progressing  Goal: Increase self care and/or family involovement by end of shift  Outcome: Progressing  Goal: Receive DSME education by end of shift  Outcome: Progressing     Problem: Skin  Goal: Decreased wound size/increased tissue granulation at next dressing change  Outcome: Progressing  Goal: Participates in plan/prevention/treatment measures  Outcome: Progressing  Goal: Prevent/manage excess moisture  Outcome: Progressing  Flowsheets (Taken 4/26/2024 1043)  Prevent/manage excess moisture:   Moisturize dry skin   Monitor for/manage infection if present   Cleanse incontinence/protect with barrier cream  Goal: Prevent/minimize sheer/friction injuries  Outcome: Progressing  Goal: Promote/optimize nutrition  Outcome: Progressing  Goal: Promote skin healing  Outcome: Progressing

## 2024-04-26 NOTE — PROGRESS NOTES
Bing Holliday is a 63 y.o. female on day 1 of admission presenting with Hypoglycemia.      Subjective   Seen and examined at bedside. Mentation improving.  A&Ox4, not actively hallucinating.  Still c/o of generalized pain. But pain in L elbow and abdomen improving        Objective     Last Recorded Vitals  /86 (BP Location: Left arm, Patient Position: Lying)   Pulse 99   Temp 36 °C (96.8 °F) (Temporal)   Resp 18   Wt 90.9 kg (200 lb 6.4 oz)   SpO2 93%   Intake/Output last 3 Shifts:    Intake/Output Summary (Last 24 hours) at 4/26/2024 1100  Last data filed at 4/26/2024 1008  Gross per 24 hour   Intake 2707.59 ml   Output 2351 ml   Net 356.59 ml       Admission Weight  Weight: 90.9 kg (200 lb 6.4 oz) (04/24/24 0953)    Daily Weight  04/24/24 : 90.9 kg (200 lb 6.4 oz)    Image Results  CT elbow left wo IV contrast  Narrative: Interpreted By:  Graciela Blevins,   STUDY:  CT ELBOW LEFT WO IV CONTRAST; ;  4/25/2024 8:31 pm      INDICATION:  Signs/Symptoms:Self mutilation of antecubital fossa, joint tender to  touch, looking for infection.      COMPARISON:  None.      ACCESSION NUMBER(S):  [id number]      ORDERING CLINICIAN:  TU ZAMARRIPA      TECHNIQUE:  Noncontrast CT images of the left elbow with axial, sagittal and  coronal reconstructed images.      FINDINGS:  No acute fracture or malalignment. Mild-to-moderate elbow  osteoarthrosis. No erosions or destructive changes. There is a small  elbow joint effusion. Otherwise, soft tissues are unremarkable. No  soft tissue gas or radiopaque foreign body.      Impression: Nonspecific small elbow joint effusion. If there is clinical concern  for septic arthritis, joint aspiration is recommended.      Otherwise, soft tissues are unremarkable.      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          MACRO:  None      Signed by: Graciela Blevins 4/25/2024 9:54 PM  Dictation workstation:   [id number]  CT abdomen pelvis wo IV contrast  Narrative: Interpreted By:  Felicity  Graciela,   STUDY:  CT ABDOMEN PELVIS WO IV CONTRAST;  4/25/2024 8:31 pm      INDICATION:  Signs/Symptoms:3 pt drop in hgb, AMS, worsening encephalopathy, hx of  secondary adrenal insuficiency and hypoglycemia.      COMPARISON:  03/20/2024      ACCESSION NUMBER(S):  [id number]      ORDERING CLINICIAN:  TU ZAMARRIPA      TECHNIQUE:  Axial noncontrast CT images of the abdomen and pelvis with coronal  and sagittal reconstructed images.      FINDINGS:  LOWER CHEST: New bilateral lower lobe airspace opacities.  Cardiomegaly and coronary artery calcifications noted. BONES: No  acute osseous abnormality. Diffuse degenerative disc changes and  lumbar facet arthropathy. Right total hip arthroplasty. Severe left  hip osteoarthrosis. ABDOMINAL WALL: Within normal limits.      ABDOMEN:  Lack of intravenous contrast limits evaluation of vessels and solid  organs. LIVER: Within normal limits.  BILE DUCTS: No biliary dilatation.  GALLBLADDER: Cholecystectomy.  PANCREAS: No peripancreatic inflammatory stranding or duct dilatation.  SPLEEN: Within normal limits.  ADRENALS: Within normal limits.      KIDNEYS, URETERS, URINARY BLADDER: No hydronephrosis. 2 mm  nonobstructing right renal calculus. Limited evaluation of the distal  ureters due to streak artifact. No hydroureter. Evaluation of the  urinary bladder due to streak artifact.      VESSELS: No aortic aneurysm.  RETROPERITONEUM: No pathologically enlarged lymph nodes.      PELVIS:      REPRODUCTIVE ORGANS: No abnormality, given limitations of the  noncontrast CT.      BOWEL: The stomach is mildly distended. No dilated small bowel.  Colonic diverticulosis without acute diverticulitis. Normal appendix.  PERITONEUM: No ascites or free air, no fluid collection.      Impression: No acute abdominal or pelvic process.      Nonspecific bilateral lower lobe airspace opacities. Please correlate  clinically to exclude pneumonia.      MACRO:  None      Signed by: Graciela Blevins 4/25/2024  9:51 PM  Dictation workstation:   [id number]  Bedside Peripheral IV Imaging  These images are not reportable by radiology and will not be interpreted   by  Radiologists.  Lower extremity venous duplex bilateral              [facility] [place]  [address] [place] [address]      Tel [phone] Fax [phone]       Vascular Lab Report     VASC US LOWER EXTREMITY VENOUS DUPLEX BILATERAL    Patient Name:      LISBET GUTIERRES      Reading Physician:  [id number] Enzo Ashley MD, RPVI  Study Date:        4/25/2024             Ordering Provider:  [id number] HILDA JUAREZ  MRN/PID:           [id number]              Fellow:  Accession#:        [id number]          Technologist:       Beth Hannah RVT, PEGGY  Date of Birth/Age: 1960 / 63 years Technologist 2:  Gender:            F                     Encounter#:         [id number]  Admission Status:  Inpatient             Location Performed: [facility]       Diagnosis/ICD: Other specified soft tissue disorders-M79.89  Indication:    Limb swelling  CPT Codes:     35731 Peripheral venous duplex scan for DVT complete       Pertinent History: COPD, Anticoagulation, AAA and LE Edema. Afib.       CONCLUSIONS:  Right Lower Venous: No evidence of acute deep vein thrombus visualized in the right lower extremity.  Left Lower Venous: No evidence of acute deep vein thrombus visualized in the left lower extremity.  Additional Findings:  Limited images due to patient's inability to tolerate compressions.       Comparison:  Compared with study from 1/15/2024, no significant change.No evidence of DVT.     Imaging & Doppler Findings:     Right                 Compressible Thrombus        Flow  Distal External  Iliac                None   Spontaneous/Phasic  CFV                       Yes        None   Spontaneous/Phasic  PFV                       Yes        None  FV Proximal               Yes        None   Spontaneous/Phasic  FV Mid                    Yes        None  FV Distal                 Yes        None  Popliteal                 Yes        None   Spontaneous/Phasic  Peroneal                  Yes        None  PTV                       Yes        None       Left                  Compress Thrombus        Flow  Distal External Iliac            None   Spontaneous/Phasic  CFV                     Yes      None   Spontaneous/Phasic  PFV                     Yes      None  FV Proximal             Yes      None   Spontaneous/Phasic  FV Mid                  Yes      None  FV Distal               Yes      None  Popliteal               Yes      None   Spontaneous/Phasic  Peroneal                Yes      None  PTV                     Yes      None       [id number] Enzo Ashley MD, RPVI  Electronically signed by [id number] Enzo Ashley MD, RPVI on 4/25/2024 at 1:07:18 PM       ** Final **      Physical Exam  General: Not in acute distress, A&O x 3, alert, well-developed,  HEENT: Normocephalic, atraumatic, EOMI, moist mucous membranes  Neck: Neck supple, trachea midline, no evidence of trauma  Cardiovascular: RRR, S1 and S2 appreciated, no murmurs rubs gallops appreciated, distal pulses 2+ bilaterally   Respiratory: Vesicular breath sounds appreciated bilaterally, no adventitious sounds appreciated, no increased work of breathing on RA  GI: Abdomen soft, nondistended, diffusely tender to palpation including when stethoscope placed on stomach, bowel sounds present  Extremities: 3+ non-pitting edema LLE 2+ pitting edema RLE  MSK: L elbow mildly TTP, scarring noted in antecubital fossa, full ROM  Neuro: A&O X3, no focal deficits, strength and sensation intact bilaterally, moving all extremities, withdrawing to pain which is diffuse and  throughout with extremely light palpation.    Skin: Warm and dry, without lesions or rashes  Relevant Results    Results for orders placed or performed during the hospital encounter of 04/24/24 (from the past 24 hour(s))   POCT GLUCOSE   Result Value Ref Range    POCT Glucose 61 (L) 74 - 99 mg/dL   POCT GLUCOSE   Result Value Ref Range    POCT Glucose 131 (H) 74 - 99 mg/dL   Renal function panel   Result Value Ref Range    Glucose 105 (H) 74 - 99 mg/dL    Sodium 144 136 - 145 mmol/L    Potassium 4.4 3.5 - 5.3 mmol/L    Chloride 116 (H) 98 - 107 mmol/L    Bicarbonate 21 21 - 32 mmol/L    Anion Gap 11 10 - 20 mmol/L    Urea Nitrogen 32 (H) 6 - 23 mg/dL    Creatinine 1.62 (H) 0.50 - 1.05 mg/dL    eGFR 36 (L) >60 mL/min/1.73m*2    Calcium 8.3 (L) 8.6 - 10.3 mg/dL    Phosphorus 2.8 2.5 - 4.9 mg/dL    Albumin 2.8 (L) 3.4 - 5.0 g/dL   POCT GLUCOSE   Result Value Ref Range    POCT Glucose 132 (H) 74 - 99 mg/dL   Blood Culture    Specimen: Peripheral Venipuncture; Blood culture   Result Value Ref Range    Blood Culture Loaded on Instrument - Culture in progress    Blood Culture    Specimen: Peripheral Venipuncture; Blood culture   Result Value Ref Range    Blood Culture Loaded on Instrument - Culture in progress    POCT GLUCOSE   Result Value Ref Range    POCT Glucose 215 (H) 74 - 99 mg/dL   POCT GLUCOSE   Result Value Ref Range    POCT Glucose 115 (H) 74 - 99 mg/dL   POCT GLUCOSE   Result Value Ref Range    POCT Glucose 85 74 - 99 mg/dL   Renal Function Panel   Result Value Ref Range    Glucose 76 74 - 99 mg/dL    Sodium 146 (H) 136 - 145 mmol/L    Potassium 5.0 3.5 - 5.3 mmol/L    Chloride 118 (H) 98 - 107 mmol/L    Bicarbonate 21 21 - 32 mmol/L    Anion Gap 12 10 - 20 mmol/L    Urea Nitrogen 29 (H) 6 - 23 mg/dL    Creatinine 1.76 (H) 0.50 - 1.05 mg/dL    eGFR 32 (L) >60 mL/min/1.73m*2    Calcium 8.4 (L) 8.6 - 10.3 mg/dL    Phosphorus 3.3 2.5 - 4.9 mg/dL    Albumin 2.7 (L) 3.4 - 5.0 g/dL   Magnesium   Result Value Ref  Range    Magnesium 2.15 1.60 - 2.40 mg/dL   CBC and Auto Differential   Result Value Ref Range    WBC 3.1 (L) 4.4 - 11.3 x10*3/uL    nRBC 0.6 (H) 0.0 - 0.0 /100 WBCs    RBC 2.46 (L) 4.00 - 5.20 x10*6/uL    Hemoglobin 7.3 (L) 12.0 - 16.0 g/dL    Hematocrit 23.7 (L) 36.0 - 46.0 %    MCV 96 80 - 100 fL    MCH 29.7 26.0 - 34.0 pg    MCHC 30.8 (L) 32.0 - 36.0 g/dL    RDW 20.1 (H) 11.5 - 14.5 %    Platelets 62 (L) 150 - 450 x10*3/uL    Neutrophils % 62.4 40.0 - 80.0 %    Immature Granulocytes %, Automated 1.3 (H) 0.0 - 0.9 %    Lymphocytes % 28.0 13.0 - 44.0 %    Monocytes % 8.0 2.0 - 10.0 %    Eosinophils % 0.3 0.0 - 6.0 %    Basophils % 0.0 0.0 - 2.0 %    Neutrophils Absolute 1.94 1.20 - 7.70 x10*3/uL    Immature Granulocytes Absolute, Automated 0.04 0.00 - 0.70 x10*3/uL    Lymphocytes Absolute 0.87 (L) 1.20 - 4.80 x10*3/uL    Monocytes Absolute 0.25 0.10 - 1.00 x10*3/uL    Eosinophils Absolute 0.01 0.00 - 0.70 x10*3/uL    Basophils Absolute 0.00 0.00 - 0.10 x10*3/uL   C-reactive protein   Result Value Ref Range    C-Reactive Protein 7.29 (H) <1.00 mg/dL   Sedimentation Rate   Result Value Ref Range    Sedimentation Rate 41 (H) 0 - 30 mm/h   Morphology   Result Value Ref Range    RBC Morphology See Below     Ovalocytes Few     [place] Cells Few    POCT GLUCOSE   Result Value Ref Range    POCT Glucose 82 74 - 99 mg/dL         CT elbow left wo IV contrast    Result Date: 4/25/2024  Interpreted By:  Graciela Blevins, STUDY: CT ELBOW LEFT WO IV CONTRAST; ;  4/25/2024 8:31 pm   INDICATION: Signs/Symptoms:Self mutilation of antecubital fossa, joint tender to touch, looking for infection.   COMPARISON: None.   ACCESSION NUMBER(S): [id number]   ORDERING CLINICIAN: TU ZAMARRIPA   TECHNIQUE: Noncontrast CT images of the left elbow with axial, sagittal and coronal reconstructed images.   FINDINGS: No acute fracture or malalignment. 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 No erosions or destructive changes. There is a small elbow joint  effusion. Otherwise, soft tissues are unremarkable. No soft tissue gas or radiopaque foreign body.       Nonspecific small elbow joint effusion. If there is clinical concern for septic arthritis, joint aspiration is recommended.   Otherwise, soft tissues are unremarkable.   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     MACRO: None   Signed by: Graciela Blevins 4/25/2024 9:54 PM Dictation workstation:   [id number]    CT abdomen pelvis wo IV contrast    Result Date: 4/25/2024  Interpreted By:  Graciela Blevins, STUDY: CT ABDOMEN PELVIS WO IV CONTRAST;  4/25/2024 8:31 pm   INDICATION: Signs/Symptoms:3 pt drop in hgb, AMS, worsening encephalopathy, hx of secondary adrenal insuficiency and hypoglycemia.   COMPARISON: 03/20/2024   ACCESSION NUMBER(S): [id number]   ORDERING CLINICIAN: TU ZAMARRIPA   TECHNIQUE: Axial noncontrast CT images of the abdomen and pelvis with coronal and sagittal reconstructed images.   FINDINGS: LOWER CHEST: New bilateral lower lobe airspace opacities. Cardiomegaly and coronary artery calcifications noted. BONES: No acute osseous abnormality. Diffuse degenerative disc changes and lumbar facet arthropathy. Right total hip arthroplasty. Severe left hip osteoarthrosis. ABDOMINAL WALL: Within normal limits.   ABDOMEN: Lack of intravenous contrast limits evaluation of vessels and solid organs. LIVER: Within normal limits. BILE DUCTS: No biliary dilatation. GALLBLADDER: Cholecystectomy. PANCREAS: No peripancreatic inflammatory stranding or duct dilatation. SPLEEN: Within normal limits. ADRENALS: Within normal limits.   KIDNEYS, URETERS, URINARY BLADDER: No hydronephrosis. 2 mm nonobstructing right renal calculus. Limited evaluation of the distal ureters due to streak artifact. No hydroureter. Evaluation of the urinary bladder due to streak artifact.   VESSELS: No aortic aneurysm. RETROPERITONEUM: No pathologically enlarged lymph nodes.   PELVIS:   REPRODUCTIVE ORGANS: No abnormality, given limitations of  the noncontrast CT.   BOWEL: The stomach is mildly distended. No dilated small bowel. Colonic diverticulosis without acute diverticulitis. Normal appendix. PERITONEUM: No ascites or free air, no fluid collection.       No acute abdominal or pelvic process.   Nonspecific bilateral lower lobe airspace opacities. Please correlate clinically to exclude pneumonia.   MACRO: None   Signed by: Graciela Blevins 4/25/2024 9:51 PM Dictation workstation:   [id number]    Bedside Peripheral IV Imaging    Result Date: 4/25/2024  These images are not reportable by radiology and will not be interpreted by  Radiologists.    Lower extremity venous duplex bilateral    Result Date: 4/25/2024            [facility] [place] [address] [address]     Tel [phone] Fax [phone]  [facility] Lab Report  [facility] Patient Name:      LISBET GUTIERRES      Reading Physician:  [id number] Enzo Ashley MD, RPVI Study Date:        4/25/2024             Ordering Provider:  [id number] HILDA JUAREZ MRN/PID:           [id number]              Fellow: Accession#:        [id number]          Technologist:       Beth Hannah RVT, RDMS Date of Birth/Age: 1960 / 63 years Technologist 2: Gender:            F                     Encounter#:         [id number] Admission Status:  Inpatient             Location Performed: [facility]  Diagnosis/ICD: Other specified soft tissue disorders-M79.89 Indication:    Limb swelling CPT Codes:     37895 Peripheral venous duplex scan for DVT complete  Pertinent History: COPD, Anticoagulation, AAA and LE Edema. Afib.  CONCLUSIONS: Right Lower Venous: No evidence of acute deep vein thrombus visualized in  the right lower extremity. Left Lower Venous: No evidence of acute deep vein thrombus visualized in the left lower extremity. Additional Findings: Limited images due to patient's inability to tolerate compressions.  Comparison: Compared with study from 1/15/2024, no significant change.No evidence of DVT.  Imaging & Doppler Findings:  Right                 Compressible Thrombus        Flow Distal External Iliac                None   Spontaneous/Phasic CFV                       Yes        None   Spontaneous/Phasic PFV                       Yes        None FV Proximal               Yes        None   Spontaneous/Phasic FV Mid                    Yes        None FV Distal                 Yes        None Popliteal                 Yes        None   Spontaneous/Phasic Peroneal                  Yes        None PTV                       Yes        None  Left                  Compress Thrombus        Flow Distal External Iliac            None   Spontaneous/Phasic CFV                     Yes      None   Spontaneous/Phasic PFV                     Yes      None FV Proximal             Yes      None   Spontaneous/Phasic FV Mid                  Yes      None FV Distal               Yes      None Popliteal               Yes      None   Spontaneous/Phasic Peroneal                Yes      None PTV                     Yes      None  [id number] Enzo Ashley MD, RPVI Electronically signed by [id number] Enzo Ashley MD, RPVI on 4/25/2024 at 1:07:18 PM  ** Final **     XR chest 1 view    Result Date: 4/24/2024  Interpreted By:  Mitesh Khan, STUDY: XR CHEST 1 VIEW;  4/24/2024 5:41 pm   INDICATION: Signs/Symptoms:cough.   COMPARISON: 03/19/2024   ACCESSION NUMBER(S): [id number]   ORDERING CLINICIAN: ENEIDA GRIER   FINDINGS:         CARDIOMEDIASTINAL SILHOUETTE: Cardiomediastinal silhouette is enlarged.   LUNGS: There is interval appearance of a right upper lobe patchy airspace disease concerning for pneumonia. The left lung is clear. There is  no effusion   ABDOMEN: No remarkable upper abdominal findings.   BONES: No acute osseous changes.       1.  New right upper lobe airspace disease compatible with pneumonia in the proper clinical setting. Radiographic follow-up to resolution in 3-4 weeks is advised.       MACRO: None   Signed by: Mitesh Khan 4/24/2024 5:50 PM Dictation workstation:   [id number]      Scheduled medications  apixaban, 2.5 mg, oral, BID  atorvastatin, 40 mg, oral, Daily  cefTRIAXone, 2 g, intravenous, q24h  famotidine, 20 mg, intravenous, q24h GERALD  folic acid, 1 mg, oral, Daily  hydrocortisone sodium succinate, 20 mg, intravenous, q12h  levETIRAcetam, 500 mg, intravenous, q12h  lidocaine, 5 mL, infiltration, Once  melatonin, 5 mg, oral, Nightly  metroNIDAZOLE, 500 mg, intravenous, q8h  multivitamin with minerals, 1 tablet, oral, Daily  [Held by provider] mycophenolate, 1,000 mg, oral, BID  [Held by provider] NIFEdipine ER, 60 mg, oral, Daily before breakfast  OLANZapine, 2.5 mg, oral, BID  pantoprazole, 40 mg, intravenous, Daily  [Held by provider] predniSONE, 20 mg, oral, Daily  risperiDONE, 0.5 mg, oral, Nightly  thiamine, 100 mg, oral, Daily      Continuous medications  dextrose 5 % and lactated Ringer's, 75 mL/hr, Last Rate: 75 mL/hr (04/26/24 1008)      PRN medications  PRN medications: dextrose, dextrose, glucagon, glucagon, HYDROmorphone, ipratropium-albuteroL   Assessment/Plan        Principal Problem:    Hypoglycemia        Malnutrition Diagnosis Status: New  Malnutrition Diagnosis: Moderate malnutrition related to acute disease or injury  As Evidenced by: significant weight loss of 5.6kg(5.8%) since last admit 30 days ago and refusing intakes for the last 3-4 days.  I agree with the dietitian's malnutrition diagnosis.    1.  Hypoglycemia in the setting of IDDM2, 2/2 chronic steroid use: Patient has a known history of hypoglycemia with multitudinous hospital presentations  Secondary AI  AI exacerbation suspect 2/2 to acute  illness  Patient [facility] to [facility] ED for chief complaint of hypoglycemia this is in the setting of decreased oral intake and still receiving her insulin at nursing facility.  -Continue hypoglycemia protocol  - Hold home insulin  - IV solucortef per endocrine, appreciate recoomendations  - Encourage p.o. intake  - D5-LR due to patient refusing PO intake  - Q4 accuchecks   - Infectious workup pending      2. Suicidality vs encephalopathy   L antecubital fossa injury-self inflicted  - 1:1 sitter  - Psychiatry consulted appreciate recs  - 3 days of olanzapine in addition to risperdal per psych  - EKG for QT monitoring   - CT L elbow showed small effusion, CRP & ESR elevated from baseline, ortho consulted, appreciate recommendations   - Rocephin & flagyl due to PCN allergy     3. 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 Atrial fibrillation on Eliquis  -Continue home Eliquis 2.5 mg twice daily  -Electrolytes optimized     5.HFrEF with Lower extremity swelling, (chronic)  TTE 3/10/2024: LVEF 40 to 45% and low normal RV systolic function.  Mild/moderate MR.  Moderately elevated RVSP of 49.5, global hypokinesis of left ventricle with minor regional variations   -Continue nifedipine  -Continue home atorvastatin 40 mg     6. Seizure Hx:  - Continue home Keppra 500 mg twice daily     7. COPD not in exacerbation with cough  Possible Pneumonia  UTI?  Vague abdominal pain  - DuoNebs every 6 hours prn  - CXR obtained  showed new RUL opacity  - Urinalysis consistent with UTI, and patient is not a reliable historian    - Start ceftriaxone 2 grams & flagyl due to PCN allergy   - BCNTD  - COVID/FLU/RSV PCRs pending  - Urine AG pending     8.  GERD:  -Continue home pantoprazole 40 mg     9 Hx transient AMS with hallucinations:  -Continue home Risperdal     IVF: LR-D5W at 75 cc/HR  Diet: Diabetic  DVT prophylaxis: Continue home Eliquis  Dispo: worsening AI due to acute infection, anticipate 1-2 more [facility] stays  Consults: None  CODE STATUS: Full code       Chaka Figueroa DO

## 2024-04-26 NOTE — PROGRESS NOTES
"Bing Holliday is a 63 y.o. female on day 1 of admission presenting with Hypoglycemia.    SUBJECTIVE:  Pt remain psychotic with visual hallucination and poor sleep last.  Still disoriented with labile affect      Exam:  Vital Signs: /86 (BP Location: Left arm, Patient Position: Lying)   Pulse 99   Temp 36 °C (96.8 °F) (Temporal)   Resp 18   Ht 1.702 m (5' 7\")   Wt 90.9 kg (200 lb 6.4 oz)   LMP  (LMP Unknown)   SpO2 93%   BMI 31.39 kg/m²   Musculoskeletal:  normal  Gait/Station: in bed      Mental Status Exam:  VH++  Cognition:  Insight: poor  Judgment: poor     Results for orders placed or performed during the hospital encounter of 04/24/24 (from the past 96 hour(s))   POCT GLUCOSE   Result Value Ref Range    POCT Glucose 53 (L) 74 - 99 mg/dL   CBC and Auto Differential   Result Value Ref Range    WBC 2.0 (L) 4.4 - 11.3 x10*3/uL    nRBC 0.0 0.0 - 0.0 /100 WBCs    RBC 2.28 (L) 4.00 - 5.20 x10*6/uL    Hemoglobin 7.0 (L) 12.0 - 16.0 g/dL    Hematocrit 23.2 (L) 36.0 - 46.0 %     (H) 80 - 100 fL    MCH 30.7 26.0 - 34.0 pg    MCHC 30.2 (L) 32.0 - 36.0 g/dL    RDW 19.1 (H) 11.5 - 14.5 %    Platelets 59 (L) 150 - 450 x10*3/uL    Neutrophils % 66.0 40.0 - 80.0 %    Immature Granulocytes %, Automated 0.5 0.0 - 0.9 %    Lymphocytes % 28.1 13.0 - 44.0 %    Monocytes % 4.4 2.0 - 10.0 %    Eosinophils % 1.0 0.0 - 6.0 %    Basophils % 0.0 0.0 - 2.0 %    Neutrophils Absolute 1.34 1.20 - 7.70 x10*3/uL    Immature Granulocytes Absolute, Automated 0.01 0.00 - 0.70 x10*3/uL    Lymphocytes Absolute 0.57 (L) 1.20 - 4.80 x10*3/uL    Monocytes Absolute 0.09 (L) 0.10 - 1.00 x10*3/uL    Eosinophils Absolute 0.02 0.00 - 0.70 x10*3/uL    Basophils Absolute 0.00 0.00 - 0.10 x10*3/uL   Comprehensive metabolic panel   Result Value Ref Range    Glucose 52 (LL) 74 - 99 mg/dL    Sodium 147 (H) 136 - 145 mmol/L    Potassium 4.6 3.5 - 5.3 mmol/L    Chloride 118 (H) 98 - 107 mmol/L    Bicarbonate 22 21 - 32 mmol/L    Anion Gap 12 " 10 - 20 mmol/L    Urea Nitrogen 44 (H) 6 - 23 mg/dL    Creatinine 1.96 (H) 0.50 - 1.05 mg/dL    eGFR 28 (L) >60 mL/min/1.73m*2    Calcium 8.2 (L) 8.6 - 10.3 mg/dL    Albumin 2.9 (L) 3.4 - 5.0 g/dL    Alkaline Phosphatase 98 33 - 136 U/L    Total Protein 5.4 (L) 6.4 - 8.2 g/dL    AST 20 9 - 39 U/L    Bilirubin, Total 0.3 0.0 - 1.2 mg/dL    ALT 19 7 - 45 U/L   Magnesium   Result Value Ref Range    Magnesium 1.39 (L) 1.60 - 2.40 mg/dL   Urinalysis with Reflex Microscopic   Result Value Ref Range    Color, Urine Yellow Straw, Yellow    Appearance, Urine Hazy (N) Clear    Specific Gravity, Urine 1.014 1.005 - 1.035    pH, Urine 7.0 5.0, 5.5, 6.0, 6.5, 7.0, 7.5, 8.0    Protein, Urine 100 (2+) (N) NEGATIVE mg/dL    Glucose, Urine NEGATIVE NEGATIVE mg/dL    Blood, Urine NEGATIVE NEGATIVE    Ketones, Urine NEGATIVE NEGATIVE mg/dL    Bilirubin, Urine NEGATIVE NEGATIVE    Urobilinogen, Urine <2.0 <2.0 mg/dL    Nitrite, Urine NEGATIVE NEGATIVE    Leukocyte Esterase, Urine LARGE (3+) (A) NEGATIVE   BLOOD GAS VENOUS FULL PANEL   Result Value Ref Range    POCT pH, Venous 7.32 (L) 7.33 - 7.43 pH    POCT pCO2, Venous 42 41 - 51 mm Hg    POCT pO2, Venous 38 35 - 45 mm Hg    POCT SO2, Venous 52 45 - 75 %    POCT Oxy Hemoglobin, Venous 51.1 45.0 - 75.0 %    POCT Hematocrit Calculated, Venous 21.0 (L) 36.0 - 46.0 %    POCT Sodium, Venous 146 (H) 136 - 145 mmol/L    POCT Potassium, Venous 4.8 3.5 - 5.3 mmol/L    POCT Chloride, Venous 120 (H) 98 - 107 mmol/L    POCT Ionized Calicum, Venous 1.25 1.10 - 1.33 mmol/L    POCT Glucose, Venous 51 (LL) 74 - 99 mg/dL    POCT Lactate, Venous 1.0 0.4 - 2.0 mmol/L    POCT Base Excess, Venous -4.2 (L) -2.0 - 3.0 mmol/L    POCT HCO3 Calculated, Venous 21.6 (L) 22.0 - 26.0 mmol/L    POCT Hemoglobin, Venous 7.0 (L) 12.0 - 16.0 g/dL    POCT Anion Gap, Venous 9.0 (L) 10.0 - 25.0 mmol/L    Patient Temperature      FiO2 21 %   Urine Gray Tube   Result Value Ref Range    Extra Tube Hold for add-ons.     Microscopic Only, Urine   Result Value Ref Range    WBC, Urine >50 (A) 1-5, NONE /HPF    RBC, Urine 1-2 NONE, 1-2, 3-5 /HPF    Bacteria, Urine 1+ (A) NONE SEEN /HPF   Hemoglobin A1c   Result Value Ref Range    Hemoglobin A1C 8.4 (H) see below %    Estimated Average Glucose 194 Not Established mg/dL   POCT GLUCOSE   Result Value Ref Range    POCT Glucose 99 74 - 99 mg/dL   Protime-INR   Result Value Ref Range    Protime 11.5 9.8 - 12.8 seconds    INR 1.0 0.9 - 1.1   POCT GLUCOSE   Result Value Ref Range    POCT Glucose 63 (L) 74 - 99 mg/dL   POCT GLUCOSE   Result Value Ref Range    POCT Glucose 61 (L) 74 - 99 mg/dL   POCT GLUCOSE   Result Value Ref Range    POCT Glucose 70 (L) 74 - 99 mg/dL   POCT GLUCOSE   Result Value Ref Range    POCT Glucose 117 (H) 74 - 99 mg/dL   POCT GLUCOSE   Result Value Ref Range    POCT Glucose 106 (H) 74 - 99 mg/dL   POCT GLUCOSE   Result Value Ref Range    POCT Glucose 86 74 - 99 mg/dL   Renal function panel   Result Value Ref Range    Glucose 79 74 - 99 mg/dL    Sodium 143 136 - 145 mmol/L    Potassium 4.8 3.5 - 5.3 mmol/L    Chloride 118 (H) 98 - 107 mmol/L    Bicarbonate 20 (L) 21 - 32 mmol/L    Anion Gap 10 10 - 20 mmol/L    Urea Nitrogen 39 (H) 6 - 23 mg/dL    Creatinine 1.81 (H) 0.50 - 1.05 mg/dL    eGFR 31 (L) >60 mL/min/1.73m*2    Calcium 7.9 (L) 8.6 - 10.3 mg/dL    Phosphorus 3.1 2.5 - 4.9 mg/dL    Albumin 2.6 (L) 3.4 - 5.0 g/dL   POCT GLUCOSE   Result Value Ref Range    POCT Glucose 83 74 - 99 mg/dL   POCT GLUCOSE   Result Value Ref Range    POCT Glucose 59 (L) 74 - 99 mg/dL   POCT GLUCOSE   Result Value Ref Range    POCT Glucose 128 (H) 74 - 99 mg/dL   CBC   Result Value Ref Range    WBC 2.1 (L) 4.4 - 11.3 x10*3/uL    nRBC 1.0 (H) 0.0 - 0.0 /100 WBCs    RBC 2.11 (L) 4.00 - 5.20 x10*6/uL    Hemoglobin 6.4 (LL) 12.0 - 16.0 g/dL    Hematocrit 21.7 (L) 36.0 - 46.0 %     (H) 80 - 100 fL    MCH 30.3 26.0 - 34.0 pg    MCHC 29.5 (L) 32.0 - 36.0 g/dL    RDW 19.2 (H) 11.5 -  14.5 %    Platelets 49 (L) 150 - 450 x10*3/uL   Renal function panel   Result Value Ref Range    Glucose 112 (H) 74 - 99 mg/dL    Sodium 143 136 - 145 mmol/L    Potassium 4.3 3.5 - 5.3 mmol/L    Chloride 116 (H) 98 - 107 mmol/L    Bicarbonate 21 21 - 32 mmol/L    Anion Gap 10 10 - 20 mmol/L    Urea Nitrogen 35 (H) 6 - 23 mg/dL    Creatinine 1.64 (H) 0.50 - 1.05 mg/dL    eGFR 35 (L) >60 mL/min/1.73m*2    Calcium 7.9 (L) 8.6 - 10.3 mg/dL    Phosphorus 2.6 2.5 - 4.9 mg/dL    Albumin 2.4 (L) 3.4 - 5.0 g/dL   Magnesium   Result Value Ref Range    Magnesium 1.60 1.60 - 2.40 mg/dL   Lactate dehydrogenase   Result Value Ref Range     84 - 246 U/L   Reticulocytes   Result Value Ref Range    Retic % 0.4 (L) 0.5 - 2.0 %    Retic Absolute 0.009 (L) 0.018 - 0.083 x10*6/uL    Reticulocyte Hemoglobin 31 28 - 38 pg    Immature Retic fraction 10.7 <=16.0 %   Iron and TIBC   Result Value Ref Range    Iron 153 (H) 35 - 150 ug/dL    UIBC <55 (L) 110 - 370 ug/dL    TIBC      % Saturation     Ferritin   Result Value Ref Range    Ferritin 1,060 (H) 8 - 150 ng/mL   TSH with reflex to Free T4 if abnormal   Result Value Ref Range    Thyroid Stimulating Hormone 2.88 0.44 - 3.98 mIU/L   Folate   Result Value Ref Range    Folate, Serum >24.0 >5.0 ng/mL   Ammonia   Result Value Ref Range    Ammonia 25 16 - 53 umol/L   Lactate   Result Value Ref Range    Lactate 1.4 0.4 - 2.0 mmol/L   C-Peptide   Result Value Ref Range    C-Peptide 2.9 0.7 - 3.9 ng/mL   Lavender Top   Result Value Ref Range    Extra Tube Hold for add-ons.    POCT GLUCOSE   Result Value Ref Range    POCT Glucose 63 (L) 74 - 99 mg/dL   POCT GLUCOSE   Result Value Ref Range    POCT Glucose 112 (H) 74 - 99 mg/dL   Type and screen   Result Value Ref Range    ABO TYPE O     Rh TYPE POS     ANTIBODY SCREEN NEG    Prepare RBC: 1 Units, Leukocytes Reduced (CMV reduced risk)   Result Value Ref Range    PRODUCT CODE [id number]     Unit Number [id number]     Unit ABO O     Unit RH  POS     XM INTEP COMP     Dispense Status TR     Blood Expiration Date May 03, 2024 23:59 EDT     PRODUCT BLOOD TYPE 5100     UNIT VOLUME 350    POCT GLUCOSE   Result Value Ref Range    POCT Glucose 61 (L) 74 - 99 mg/dL   POCT GLUCOSE   Result Value Ref Range    POCT Glucose 131 (H) 74 - 99 mg/dL   Renal function panel   Result Value Ref Range    Glucose 105 (H) 74 - 99 mg/dL    Sodium 144 136 - 145 mmol/L    Potassium 4.4 3.5 - 5.3 mmol/L    Chloride 116 (H) 98 - 107 mmol/L    Bicarbonate 21 21 - 32 mmol/L    Anion Gap 11 10 - 20 mmol/L    Urea Nitrogen 32 (H) 6 - 23 mg/dL    Creatinine 1.62 (H) 0.50 - 1.05 mg/dL    eGFR 36 (L) >60 mL/min/1.73m*2    Calcium 8.3 (L) 8.6 - 10.3 mg/dL    Phosphorus 2.8 2.5 - 4.9 mg/dL    Albumin 2.8 (L) 3.4 - 5.0 g/dL   POCT GLUCOSE   Result Value Ref Range    POCT Glucose 132 (H) 74 - 99 mg/dL   Blood Culture    Specimen: Peripheral Venipuncture; Blood culture   Result Value Ref Range    Blood Culture Loaded on Instrument - Culture in progress    Blood Culture    Specimen: Peripheral Venipuncture; Blood culture   Result Value Ref Range    Blood Culture Loaded on Instrument - Culture in progress    POCT GLUCOSE   Result Value Ref Range    POCT Glucose 215 (H) 74 - 99 mg/dL   POCT GLUCOSE   Result Value Ref Range    POCT Glucose 115 (H) 74 - 99 mg/dL   POCT GLUCOSE   Result Value Ref Range    POCT Glucose 85 74 - 99 mg/dL   Renal Function Panel   Result Value Ref Range    Glucose 76 74 - 99 mg/dL    Sodium 146 (H) 136 - 145 mmol/L    Potassium 5.0 3.5 - 5.3 mmol/L    Chloride 118 (H) 98 - 107 mmol/L    Bicarbonate 21 21 - 32 mmol/L    Anion Gap 12 10 - 20 mmol/L    Urea Nitrogen 29 (H) 6 - 23 mg/dL    Creatinine 1.76 (H) 0.50 - 1.05 mg/dL    eGFR 32 (L) >60 mL/min/1.73m*2    Calcium 8.4 (L) 8.6 - 10.3 mg/dL    Phosphorus 3.3 2.5 - 4.9 mg/dL    Albumin 2.7 (L) 3.4 - 5.0 g/dL   Magnesium   Result Value Ref Range    Magnesium 2.15 1.60 - 2.40 mg/dL   CBC and Auto Differential   Result  Value Ref Range    WBC 3.1 (L) 4.4 - 11.3 x10*3/uL    nRBC 0.6 (H) 0.0 - 0.0 /100 WBCs    RBC 2.46 (L) 4.00 - 5.20 x10*6/uL    Hemoglobin 7.3 (L) 12.0 - 16.0 g/dL    Hematocrit 23.7 (L) 36.0 - 46.0 %    MCV 96 80 - 100 fL    MCH 29.7 26.0 - 34.0 pg    MCHC 30.8 (L) 32.0 - 36.0 g/dL    RDW 20.1 (H) 11.5 - 14.5 %    Platelets 62 (L) 150 - 450 x10*3/uL    Neutrophils % 62.4 40.0 - 80.0 %    Immature Granulocytes %, Automated 1.3 (H) 0.0 - 0.9 %    Lymphocytes % 28.0 13.0 - 44.0 %    Monocytes % 8.0 2.0 - 10.0 %    Eosinophils % 0.3 0.0 - 6.0 %    Basophils % 0.0 0.0 - 2.0 %    Neutrophils Absolute 1.94 1.20 - 7.70 x10*3/uL    Immature Granulocytes Absolute, Automated 0.04 0.00 - 0.70 x10*3/uL    Lymphocytes Absolute 0.87 (L) 1.20 - 4.80 x10*3/uL    Monocytes Absolute 0.25 0.10 - 1.00 x10*3/uL    Eosinophils Absolute 0.01 0.00 - 0.70 x10*3/uL    Basophils Absolute 0.00 0.00 - 0.10 x10*3/uL   C-reactive protein   Result Value Ref Range    C-Reactive Protein 7.29 (H) <1.00 mg/dL   Sedimentation Rate   Result Value Ref Range    Sedimentation Rate 41 (H) 0 - 30 mm/h   Morphology   Result Value Ref Range    RBC Morphology See Below     Ovalocytes Few     Katarzyna Cells Few    POCT GLUCOSE   Result Value Ref Range    POCT Glucose 82 74 - 99 mg/dL   POCT GLUCOSE   Result Value Ref Range    POCT Glucose 86 74 - 99 mg/dL   RSV PCR   Result Value Ref Range    RSV PCR Not Detected Not Detected   Sars-CoV-2 and Influenza A/B PCR   Result Value Ref Range    Flu A Result Not Detected Not Detected    Flu B Result Not Detected Not Detected    Coronavirus 2019, PCR Not Detected Not Detected           Impression:   Delirium    Recommendations:    1. Increase zyprexa to 5 mg po bid  2. Reorienting the patient  3. Treat primary cause of confusion  Will continue to follow    Thank you for allowing us to participate in the care of this patient.       Yaniv Lee MD  4/26/2024  2:51 PM

## 2024-04-26 NOTE — CONSULTS
Endocrinology Initial Inpatient Consult Note     PATIENT NAME: Bing Holliday  MRN: [id number]  DATE: 4/26/2024    CONSULTING PHYSICIAN: Dr. Yolanda Moreno.  REASON FOR CONSULT: AI. T2DM.      History of Presenting Illness     62y F w. PMHx of:      # Systemic Lupus Erythematous c/b Cerebritis and Jaccoud's Arthropathy      # Uncontrolled T2DM      # Hx of C Difficile Infection (5/23)      # Stage IV CKD      # Paroxysmal Atrial Fibrillation      # Heart Failure with Reduced LVEF      # Osteoporosis      # GERD      # COPD      # pHTN      # HTN      # HLD    Patient is brought in by EMS from a skilled nurse facility for hypoglycemia.  Patient reported is having refractory hypoglycemia.  Reported the patient was given glucagon without improvement of her blood sugars.  Most of the history is taken via chart review.  The patient cannot contribute to history.  She is delirious.  She is having visual hallucinations.  She tells me that López is in the cabinet and he needs water before he passes out.  She cannot tell me her name.  She cannot tell me where she is.    In the ER, glucose 153 mg/dL.  The patient CBC showed leukopenia with a WBC count of 2 as well as a macrocytic hypochromic anemia with a hemoglobin of 7 g/dL.  CMP redemonstrated low glucose of 52 mg/dL.  She was hyponatremic to 147.  Her creatinine was elevated to 1.96.  Urinalysis was remarkable for pyuria as well as 3+ leukocyte Estrace.  Venous blood gas showed a pH of 7.32.  Patient's A1c was found to be elevated to 8.4%.  TSH was drawn was found to be 2.88.  The patient received 1 unit of packed red blood cells.  She was admitted for further management.  Plain radiograph of the chest showed a new right upper lobe airspace disease compatible with pneumonia.  She underwent a CT of the abdomen and pelvis without iodinated contrast which showed bilateral lower lobe airspace opacities but no acute abdominal pelvic process.  A CT of her elbow did show a small  "effusion.      Review of Systems (Bold if Positive)     Unable to Obtain 2/2 Mental Status.      Physical Examination     /58 (BP Location: Right arm, Patient Position: Lying)   Pulse 82   Temp 36 °C (96.8 °F) (Temporal)   Resp 17   Ht 1.702 m (5' 7\")   Wt 90.9 kg (200 lb 6.4 oz)   LMP  (LMP Unknown)   SpO2 98%   BMI 31.39 kg/m²   General: Delirious.  Alert and oriented x 1 (self).  Having visual hallucinations.  Inattentive.  HEENT: PERRLA. EOMi. Ø Exophthalmos   Neck: No LAD.  Thyroid: 20g Ø Bruit  CV: Normal rate and rhythm. No murmurs or rubs auscultated.   Resp: CTA b/l. No wheezing or crackles.   Abdomen: Generalized abdominal pain.  No rebound tenderness.  Extremities: Trace pedal edema b/l.  Ulnar deviation of the bilateral fingers.  Neuro: CN II-XII Grossly Intact.  Bilateral hand tremors. Brisk Reflexes.   Psych: Inattentive.  Skin: No apparent rashes      Past Medical / Surgical / Family / Social History     Past Medical History:   Diagnosis Date    Abnormal kidney function 04/04/2023    Acute headache 03/10/2024    Acute low back pain 10/30/2023    Acute upper respiratory infection, unspecified 03/04/2020    Acute URI    Acute upper respiratory infection, unspecified 09/30/2015    URTI (acute upper respiratory infection)    Arthritis     Atypical facial pain 03/10/2024    Body mass index (BMI) 23.0-23.9, adult 10/15/2021    BMI 23.0-23.9, adult    Body mass index (BMI) 33.0-33.9, adult 03/04/2020    BMI 33.0-33.9,adult    Cardiomegaly 08/27/2013    Left ventricular hypertrophy    Chest pain 06/10/2022    Comment on above: Added by Problem List Migration; 2013-3-12; Moved to Suppressed Nov 25 2013 9:16PM; Comment on above: Added by Problem List Migration; 2013-3-12; Moved to Suppressed Nov 25 2013 9:16PM;    Chronic kidney disease, stage 3 unspecified (Multi) 07/02/2013    Chronic kidney disease, stage III (moderate)    Confusional state 03/10/2024    Contact with and (suspected) exposure to " covid-19 04/04/2023    Delirium 03/10/2024    Disease of pericardium, unspecified ([facility]) 07/02/2013    Pericardial disease    Encounter for follow-up examination after completed treatment for conditions other than malignant neoplasm 10/06/2022    Hospital discharge follow-up    Generalized contraction of visual field, right eye 01/29/2015    Generalized contraction of visual field of right eye    Hearing loss 03/10/2024    History of cataract 03/10/2024    History of thrombocytopenia 03/10/2024    Comment on above: Added by Problem List Migration; 2013-7-2;    Homonymous bilateral field defects, right side 04/29/2016    Homonymous bilateral field defects of right side    Hypertensive chronic kidney disease with stage 1 through stage 4 chronic kidney disease, or unspecified chronic kidney disease 07/02/2013    Nephrosclerosis    Laceration without foreign body, left foot, initial encounter 07/03/2018    Foot laceration, left, initial encounter    Localized edema 03/10/2024    Mass of skin 03/10/2024    Migraine with aura, not intractable, without status migrainosus 10/24/2022    Ocular migraine    Open wound 03/10/2024    Open wound of left foot 03/10/2024    Other conditions influencing health status 07/02/2013    Chronic Glomerulonephritis In Diseases Classified Elsewhere    Other conditions influencing health status 07/02/2013    Progressive Familial Myoclonic Epilepsy    Other conditions influencing health status 07/02/2013    Protein S Deficiency    Other conditions influencing health status 05/22/2015    Familial Combined Hyperlipidemia    Other conditions influencing health status 10/24/2022    IDDM (insulin dependent diabetes mellitus)    Other conditions influencing health status 03/14/2022    Diabetes mellitus, insulin dependent (IDDM), uncontrolled    Other long term (current) drug therapy 10/24/2022    Long-term use of Plaquenil    Overweight with body mass index (BMI) 25.0-29.9 03/10/2024    Pain of  toe 03/10/2024    Personal history of COVID-19 04/04/2023    Personal history of diseases of the blood and blood-forming organs and certain disorders involving the immune mechanism 07/02/2013    History of thrombocytopenia    Personal history of diseases of the skin and subcutaneous tissue 08/11/2015    History of foot ulcer    Personal history of nephrotic syndrome 07/02/2013    History of nephrotic syndrome    Personal history of other diseases of the circulatory system 08/27/2013    History of sinus tachycardia    Personal history of other diseases of the nervous system and sense organs     History of cataract    Personal history of other diseases of the respiratory system     History of bronchitis    Personal history of other infectious and parasitic diseases 07/02/2013    History of hepatitis    Personal history of other specified conditions     History of shortness of breath    Personal history of other specified conditions 08/27/2013    History of edema    Posterior epistaxis 03/10/2024    Puckering of macula, right eye 10/24/2022    ERM OD (epiretinal membrane, right eye)    Raynaud's syndrome without gangrene 07/02/2013    Raynaud's disease    Sinusitis 03/10/2024    Systemic lupus erythematosus, unspecified (Multi) 07/24/2015    SLE (systemic lupus erythematosus)    Systemic lupus erythematosus, unspecified (Multi) 07/24/2015    SLE (systemic lupus erythematosus)    Systemic lupus erythematosus, unspecified (Multi) 07/24/2015    Systemic lupus    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) 07/02/2013    Type 2 diabetes with nephropathy    Type 2 diabetes mellitus with mild nonproliferative diabetic retinopathy without macular edema, left eye (Multi) 07/27/2015    Non-proliferative diabetic retinopathy, left eye    Type 2 diabetes mellitus with mild nonproliferative diabetic retinopathy without macular edema, unspecified eye (Multi) 07/24/2015    Mild non proliferative diabetic retinopathy     Type 2 diabetes mellitus with proliferative diabetic retinopathy without macular edema, right eye (Multi) 07/27/2015    Proliferative diabetic retinopathy of right eye    Type 2 diabetes mellitus with proliferative diabetic retinopathy without macular edema, unspecified eye (Multi) 07/24/2015    Diabetic proliferative retinopathy    Unspecified acute and subacute iridocyclitis 07/24/2015    Acute iritis, right eye    Unspecified open wound, left foot, sequela 07/03/2018    Wound, open, foot, left, sequela     Past Surgical History:   Procedure Laterality Date    ANKLE SURGERY  01/29/2015    Ankle Surgery    CHOLECYSTECTOMY  01/29/2015    Cholecystectomy    CT GUIDED PERCUTANEOUS BIOPSY BONE DEEP  5/4/2021    CT GUIDED PERCUTANEOUS BIOPSY BONE DEEP 5/4/2021 [facility]    EYE SURGERY  03/06/2015    Eye Surgery    FOOT SURGERY  01/29/2015    Foot Surgery    MR HEAD ANGIO WO IV CONTRAST  7/26/2013    MR HEAD ANGIO WO IV CONTRAST 7/26/2013 [facility]ICAL LEGAC[facility]    MR HEAD ANGIO WO IV CONTRAST  9/17/2021    MR HEAD ANGIO WO IV CONTRAST 9/17/2021 [facility]    MR HEAD ANGIO WO IV CONTRAST  3/25/2023    MR HEAD ANGIO WO IV CONTRAST [facility]    MR NECK ANGIO WO IV CONTRAST  7/26/2013    MR NECK ANGIO WO IV CONTRAST 7/26/2013 [facility]GAC[facility]    MR NECK ANGIO WO IV CONTRAST  9/17/2021    MR NECK ANGIO WO IV CONTRAST 9/17/2021 [facility]    MR NECK ANGIO WO IV CONTRAST  3/25/2023    MR NECK ANGIO WO IV CONTRAST [facility]    OTHER SURGICAL HISTORY  01/29/2015    Creation Of Pericardial Window    OTHER SURGICAL HISTORY  01/29/2015    Quadricepsplasty    TOTAL HIP ARTHROPLASTY  01/29/2015    Hip Replacement     Family History   Problem Relation Name Age of Onset    Other (RENAL DISEASE) Mother          END STAGE    Other (CARDIAC DISORDER) Mother      Cataracts Mother      Stroke Mother      Diabetes Mother      Kidney disease Mother      Lupus Mother      Other (CARDIAC DISORDER) Father      COPD  Father      Glaucoma Father      Hypertension Father      Sleep apnea Father      Other (CARDIAC DISORDER) Sister      Depression Sister      Kidney disease Sister      Sickle cell trait Sister      Sleep apnea Daughter       Social History     Socioeconomic History    Marital status:      Spouse name: Not on file    Number of children: Not on file    Years of education: Not on file    Highest education level: Not on file   Occupational History    Not on file   Tobacco Use    Smoking status: Never     Passive exposure: Never    Smokeless tobacco: Never   Vaping Use    Vaping status: Never Used   Substance and Sexual Activity    Alcohol use: Not Currently     Comment: RARE    Drug use: Never    Sexual activity: Defer   Other Topics Concern    Not on file   Social History Narrative    Not on file     Social Determinants of Health     Financial Resource Strain: Low Risk  (4/24/2024)    Overall Financial Resource Strain (CARDIA)     Difficulty of Paying Living Expenses: Not hard at all   Food Insecurity: Patient Unable To Answer (3/9/2024)    Hunger Vital Sign     Worried About Running Out of Food in the Last Year: Patient unable to answer     Ran Out of Food in the Last Year: Patient unable to answer   Transportation Needs: No Transportation Needs (4/24/2024)    PRAPARE - Transportation     Lack of Transportation (Medical): No     Lack of Transportation (Non-Medical): No   Physical Activity: Patient Declined (1/15/2024)    Exercise Vital Sign     Days of Exercise per Week: Patient declined     Minutes of Exercise per Session: Patient declined   Stress: No Stress Concern Present (1/15/2024)    [employer] [place] [employer] - Occupational Stress Questionnaire     Feeling of Stress : Not at all   Social Connections: Unknown (1/15/2024)    Social Connection and Isolation Panel [NHANES]     Frequency of Communication with Friends and Family: More than three times a week     Frequency of Social Gatherings  "with Friends and Family: More than three times a week     Attends [employer] Services: Patient declined     Active Member of Clubs or Organizations: Patient declined     Attends Club or Organization Meetings: Patient declined     Marital Status: Patient unable to answer   Intimate Partner Violence: Not At Risk (1/15/2024)    Humiliation, Afraid, Rape, and Kick questionnaire     Fear of Current or Ex-Partner: No     Emotionally Abused: No     Physically Abused: No     Sexually Abused: No   Housing Stability: Low Risk  (4/24/2024)    Housing Stability Vital Sign     Unable to Pay for Housing in the Last Year: No     Number of Places Lived in the Last Year: 1     Unstable Housing in the Last Year: No       Medications & Allergies     MAR and PTA Meds Reviewed     Allergies   Allergen Reactions    Ace Inhibitors Swelling, Angioedema, Shortness of breath and Other     'FACIAL TWISTING\" \"LOOKS LIKE I HAD A STROKE IN MY SLEEP\"    Hydroxychloroquine Other, Nausea And Vomiting, Nausea/vomiting and Unknown     RETINAL BLEEDING    RETINAL BLEEDING      RETINAL BLEEDING, Eye problems    Lisinopril Swelling    Penicillins Unknown     tolerates cephalosporins-unknown childhood allergy    Sulfa (Sulfonamide Antibiotics) Hives       Data     Recent Labs and Imaging Reviewed      Assessment / Plan       # Adrenal Insufficiency  Multiple consult notes outlining my clinical formulation of the same.  The patient is now on hydrocortisone 25 mg every 6 hours.  I will decrease this to hydrocortisone 20 mg twice daily to decrease her steroid dose in hoping that this will also help with her encephalopathy and hallucinations.  I did look in her paper chart to try to see what her medication was at the [facility], this was not available.  Trying to understand if she was ordered and being given steroids during her stay there.    # Hypoglycemia  This has been worked up in the past.  Workup has shown not insulin-dependent hypoglycemia.  This is likely " due to severe protein calorie malnutrition.  The patient does have evidence of the same by means of temporal wasting as well as an albumin of 2.7 g/dL.  In my many times of taking care of her, she does not have hypoglycemia when she is started on glucocorticoids.  I am not surprised she did not respond to glucagon, this keeps in line with the fact that she does not have much glycogen stores given her severe protein calorie malnutrition.  Query if her hypocortisolism is contributing to the same.  Her glucose trends have been quite well since being started on glucocorticoids here in the hospital.  I recommend giving her regular diet and letting her eat.  I think we can stop her dextrose containing fluids.    # Uncontrolled Type 2 Diabetes Mellitus  Home Regimen: None.  Hemoglobin A1c (7/23): 6.9%  Nutrtion: Regular Diet.    She tends to get hyperglycemic on high doses of steroids.  We can hold off on sliding scale insulin for now.    # Osteoporosis  In the setting of chronic glucocorticoid use.  This will be further managed as an outpatient.  She probably would benefit from a bisphosphonate, these agents are best studied with steroid-induced adynamic bone disease.       Avi Sloan, DO  Endocrinology, Diabetes, and Metabolism    Available via [employer] Messenger    Please excuse and typographical or unwanted errors within this documentation as voice recognition software was used to dictate this note.

## 2024-04-27 ENCOUNTER — APPOINTMENT (OUTPATIENT)
Dept: RADIOLOGY | Facility: HOSPITAL | Age: 64
DRG: 637 | End: 2024-04-27
Payer: MEDICARE

## 2024-04-27 LAB
ACANTHOCYTES BLD QL SMEAR: NORMAL
ALBUMIN SERPL BCP-MCNC: 2.5 G/DL (ref 3.4–5)
ALP SERPL-CCNC: 93 U/L (ref 33–136)
ALT SERPL W P-5'-P-CCNC: 18 U/L (ref 7–45)
ANION GAP SERPL CALC-SCNC: 10 MMOL/L (ref 10–20)
AST SERPL W P-5'-P-CCNC: 19 U/L (ref 9–39)
BASOPHILS # BLD AUTO: 0.01 X10*3/UL (ref 0–0.1)
BASOPHILS NFR BLD AUTO: 0.3 %
BILIRUB SERPL-MCNC: 0.3 MG/DL (ref 0–1.2)
BLOOD EXPIRATION DATE: NORMAL
BUN SERPL-MCNC: 22 MG/DL (ref 6–23)
BURR CELLS BLD QL SMEAR: NORMAL
CALCIUM SERPL-MCNC: 7.7 MG/DL (ref 8.6–10.3)
CHLORIDE SERPL-SCNC: 118 MMOL/L (ref 98–107)
CO2 SERPL-SCNC: 20 MMOL/L (ref 21–32)
CREAT SERPL-MCNC: 1.77 MG/DL (ref 0.5–1.05)
DISPENSE STATUS: NORMAL
EGFRCR SERPLBLD CKD-EPI 2021: 32 ML/MIN/1.73M*2
EOSINOPHIL # BLD AUTO: 0.02 X10*3/UL (ref 0–0.7)
EOSINOPHIL NFR BLD AUTO: 0.6 %
ERYTHROCYTE [DISTWIDTH] IN BLOOD BY AUTOMATED COUNT: 19 % (ref 11.5–14.5)
ERYTHROCYTE [DISTWIDTH] IN BLOOD BY AUTOMATED COUNT: 20.4 % (ref 11.5–14.5)
GLUCOSE BLD MANUAL STRIP-MCNC: 106 MG/DL (ref 74–99)
GLUCOSE BLD MANUAL STRIP-MCNC: 109 MG/DL (ref 74–99)
GLUCOSE BLD MANUAL STRIP-MCNC: 113 MG/DL (ref 74–99)
GLUCOSE BLD MANUAL STRIP-MCNC: 128 MG/DL (ref 74–99)
GLUCOSE BLD MANUAL STRIP-MCNC: 76 MG/DL (ref 74–99)
GLUCOSE BLD MANUAL STRIP-MCNC: 88 MG/DL (ref 74–99)
GLUCOSE BLD MANUAL STRIP-MCNC: 97 MG/DL (ref 74–99)
GLUCOSE SERPL-MCNC: 133 MG/DL (ref 74–99)
HCT VFR BLD AUTO: 22 % (ref 36–46)
HCT VFR BLD AUTO: 27.6 % (ref 36–46)
HGB BLD-MCNC: 6.6 G/DL (ref 12–16)
HGB BLD-MCNC: 8.4 G/DL (ref 12–16)
IMM GRANULOCYTES # BLD AUTO: 0.12 X10*3/UL (ref 0–0.7)
IMM GRANULOCYTES NFR BLD AUTO: 3.6 % (ref 0–0.9)
LEGIONELLA AG UR QL: NEGATIVE
LYMPHOCYTES # BLD AUTO: 0.52 X10*3/UL (ref 1.2–4.8)
LYMPHOCYTES NFR BLD AUTO: 15.4 %
MAGNESIUM SERPL-MCNC: 1.67 MG/DL (ref 1.6–2.4)
MCH RBC QN AUTO: 29.7 PG (ref 26–34)
MCH RBC QN AUTO: 30.1 PG (ref 26–34)
MCHC RBC AUTO-ENTMCNC: 30 G/DL (ref 32–36)
MCHC RBC AUTO-ENTMCNC: 30.4 G/DL (ref 32–36)
MCV RBC AUTO: 101 FL (ref 80–100)
MCV RBC AUTO: 98 FL (ref 80–100)
MONOCYTES # BLD AUTO: 0.29 X10*3/UL (ref 0.1–1)
MONOCYTES NFR BLD AUTO: 8.6 %
NEUTROPHILS # BLD AUTO: 2.41 X10*3/UL (ref 1.2–7.7)
NEUTROPHILS NFR BLD AUTO: 71.5 %
NRBC BLD-RTO: 0.6 /100 WBCS (ref 0–0)
NRBC BLD-RTO: 1.2 /100 WBCS (ref 0–0)
OVALOCYTES BLD QL SMEAR: NORMAL
PLATELET # BLD AUTO: 51 X10*3/UL (ref 150–450)
PLATELET # BLD AUTO: 55 X10*3/UL (ref 150–450)
POLYCHROMASIA BLD QL SMEAR: NORMAL
POTASSIUM SERPL-SCNC: 4 MMOL/L (ref 3.5–5.3)
PRODUCT BLOOD TYPE: 5100
PRODUCT CODE: NORMAL
PROT SERPL-MCNC: 4.6 G/DL (ref 6.4–8.2)
RBC # BLD AUTO: 2.19 X10*6/UL (ref 4–5.2)
RBC # BLD AUTO: 2.83 X10*6/UL (ref 4–5.2)
RBC MORPH BLD: NORMAL
S PNEUM AG UR QL: POSITIVE
SODIUM SERPL-SCNC: 144 MMOL/L (ref 136–145)
UNIT ABO: NORMAL
UNIT NUMBER: NORMAL
UNIT RH: NORMAL
UNIT VOLUME: 350
WBC # BLD AUTO: 2.5 X10*3/UL (ref 4.4–11.3)
WBC # BLD AUTO: 3.4 X10*3/UL (ref 4.4–11.3)
XM INTEP: NORMAL

## 2024-04-27 PROCEDURE — P9016 RBC LEUKOCYTES REDUCED: HCPCS

## 2024-04-27 PROCEDURE — 2500000001 HC RX 250 WO HCPCS SELF ADMINISTERED DRUGS (ALT 637 FOR MEDICARE OP)

## 2024-04-27 PROCEDURE — 76705 ECHO EXAM OF ABDOMEN: CPT | Performed by: RADIOLOGY

## 2024-04-27 PROCEDURE — 1200000002 HC GENERAL ROOM WITH TELEMETRY DAILY

## 2024-04-27 PROCEDURE — 2500000004 HC RX 250 GENERAL PHARMACY W/ HCPCS (ALT 636 FOR OP/ED)

## 2024-04-27 PROCEDURE — 85025 COMPLETE CBC W/AUTO DIFF WBC: CPT | Performed by: STUDENT IN AN ORGANIZED HEALTH CARE EDUCATION/TRAINING PROGRAM

## 2024-04-27 PROCEDURE — 99221 1ST HOSP IP/OBS SF/LOW 40: CPT | Performed by: STUDENT IN AN ORGANIZED HEALTH CARE EDUCATION/TRAINING PROGRAM

## 2024-04-27 PROCEDURE — 83735 ASSAY OF MAGNESIUM: CPT | Performed by: STUDENT IN AN ORGANIZED HEALTH CARE EDUCATION/TRAINING PROGRAM

## 2024-04-27 PROCEDURE — 36430 TRANSFUSION BLD/BLD COMPNT: CPT

## 2024-04-27 PROCEDURE — 36415 COLL VENOUS BLD VENIPUNCTURE: CPT | Performed by: STUDENT IN AN ORGANIZED HEALTH CARE EDUCATION/TRAINING PROGRAM

## 2024-04-27 PROCEDURE — 2500000004 HC RX 250 GENERAL PHARMACY W/ HCPCS (ALT 636 FOR OP/ED): Performed by: INTERNAL MEDICINE

## 2024-04-27 PROCEDURE — 99232 SBSQ HOSP IP/OBS MODERATE 35: CPT | Performed by: PSYCHIATRY & NEUROLOGY

## 2024-04-27 PROCEDURE — 2500000006 HC RX 250 W HCPCS SELF ADMINISTERED DRUGS (ALT 637 FOR ALL PAYERS): Performed by: PSYCHIATRY & NEUROLOGY

## 2024-04-27 PROCEDURE — 85027 COMPLETE CBC AUTOMATED: CPT

## 2024-04-27 PROCEDURE — 76705 ECHO EXAM OF ABDOMEN: CPT

## 2024-04-27 PROCEDURE — 99233 SBSQ HOSP IP/OBS HIGH 50: CPT

## 2024-04-27 PROCEDURE — 36415 COLL VENOUS BLD VENIPUNCTURE: CPT

## 2024-04-27 PROCEDURE — 82947 ASSAY GLUCOSE BLOOD QUANT: CPT | Mod: 91,MUE

## 2024-04-27 PROCEDURE — 80053 COMPREHEN METABOLIC PANEL: CPT | Performed by: STUDENT IN AN ORGANIZED HEALTH CARE EDUCATION/TRAINING PROGRAM

## 2024-04-27 PROCEDURE — C9113 INJ PANTOPRAZOLE SODIUM, VIA: HCPCS

## 2024-04-27 RX ORDER — DEXTROSE MONOHYDRATE 100 MG/ML
50 INJECTION, SOLUTION INTRAVENOUS CONTINUOUS
Status: ACTIVE | OUTPATIENT
Start: 2024-04-27 | End: 2024-04-28

## 2024-04-27 RX ADMIN — LEVETIRACETAM 500 MG: 5 INJECTION INTRAVENOUS at 17:51

## 2024-04-27 RX ADMIN — DEXTROSE MONOHYDRATE 75 ML/HR: 100 INJECTION, SOLUTION INTRAVENOUS at 09:30

## 2024-04-27 RX ADMIN — HYDROCORTISONE SODIUM SUCCINATE 20 MG: 100 INJECTION, POWDER, FOR SOLUTION INTRAMUSCULAR; INTRAVENOUS at 05:53

## 2024-04-27 RX ADMIN — Medication 1 TABLET: at 07:47

## 2024-04-27 RX ADMIN — RISPERIDONE 0.5 MG: 0.5 TABLET ORAL at 20:12

## 2024-04-27 RX ADMIN — METRONIDAZOLE 500 MG: 500 INJECTION, SOLUTION INTRAVENOUS at 08:11

## 2024-04-27 RX ADMIN — HYDROCORTISONE SODIUM SUCCINATE 20 MG: 100 INJECTION, POWDER, FOR SOLUTION INTRAMUSCULAR; INTRAVENOUS at 17:48

## 2024-04-27 RX ADMIN — METRONIDAZOLE 500 MG: 500 INJECTION, SOLUTION INTRAVENOUS at 17:57

## 2024-04-27 RX ADMIN — Medication 5 MG: at 20:12

## 2024-04-27 RX ADMIN — LEVETIRACETAM 500 MG: 5 INJECTION INTRAVENOUS at 04:53

## 2024-04-27 RX ADMIN — APIXABAN 2.5 MG: 2.5 TABLET, FILM COATED ORAL at 20:12

## 2024-04-27 RX ADMIN — METRONIDAZOLE 500 MG: 500 INJECTION, SOLUTION INTRAVENOUS at 01:45

## 2024-04-27 RX ADMIN — OLANZAPINE 5 MG: 5 TABLET, FILM COATED ORAL at 20:12

## 2024-04-27 RX ADMIN — FAMOTIDINE 20 MG: 10 INJECTION, SOLUTION INTRAVENOUS at 07:46

## 2024-04-27 RX ADMIN — CEFTRIAXONE SODIUM 2 G: 2 INJECTION, SOLUTION INTRAVENOUS at 23:30

## 2024-04-27 ASSESSMENT — COGNITIVE AND FUNCTIONAL STATUS - GENERAL
TOILETING: A LOT
CLIMB 3 TO 5 STEPS WITH RAILING: TOTAL
MOBILITY SCORE: 9
MOVING TO AND FROM BED TO CHAIR: A LOT
DRESSING REGULAR LOWER BODY CLOTHING: A LOT
DAILY ACTIVITIY SCORE: 9
TURNING FROM BACK TO SIDE WHILE IN FLAT BAD: A LOT
PERSONAL GROOMING: A LOT
WALKING IN HOSPITAL ROOM: TOTAL
MOVING TO AND FROM BED TO CHAIR: A LOT
TURNING FROM BACK TO SIDE WHILE IN FLAT BAD: A LOT
DRESSING REGULAR UPPER BODY CLOTHING: A LOT
HELP NEEDED FOR BATHING: A LOT
WALKING IN HOSPITAL ROOM: TOTAL
CLIMB 3 TO 5 STEPS WITH RAILING: TOTAL
STANDING UP FROM CHAIR USING ARMS: A LOT
HELP NEEDED FOR BATHING: TOTAL
EATING MEALS: TOTAL
DRESSING REGULAR LOWER BODY CLOTHING: A LOT
DAILY ACTIVITIY SCORE: 11
TOILETING: TOTAL
MOVING FROM LYING ON BACK TO SITTING ON SIDE OF FLAT BED WITH BEDRAILS: A LOT
STANDING UP FROM CHAIR USING ARMS: TOTAL
PERSONAL GROOMING: A LOT
DRESSING REGULAR UPPER BODY CLOTHING: A LOT
EATING MEALS: TOTAL

## 2024-04-27 ASSESSMENT — PAIN SCALES - GENERAL
PAINLEVEL_OUTOF10: 0 - NO PAIN

## 2024-04-27 ASSESSMENT — ACTIVITIES OF DAILY LIVING (ADL): LACK_OF_TRANSPORTATION: NO

## 2024-04-27 ASSESSMENT — PAIN SCALES - WONG BAKER: WONGBAKER_NUMERICALRESPONSE: NO HURT

## 2024-04-27 NOTE — CONSULTS
Reason For Consult  Left elbow pain    History Of Present Illness  Bing Holliday is a 63 y.o. 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 She is an unreliable historian, is Alert but oriented to name only on my exam. She complains of medial elbow pain, but cannot quantify the pain or give details regarding history of the course of her left elbow pain.      Past Medical History  She has a past medical history of Abnormal kidney function (04/04/2023), Acute headache (03/10/2024), Acute low back pain (10/30/2023), Acute upper respiratory infection, unspecified (03/04/2020), Acute upper respiratory infection, unspecified (09/30/2015), Arthritis, Atypical facial pain (03/10/2024), Body mass index (BMI) 23.0-23.9, adult (10/15/2021), Body mass index (BMI) 33.0-33.9, adult (03/04/2020), Cardiomegaly (08/27/2013), Chest pain (06/10/2022), Chronic kidney disease, stage 3 unspecified (Multi) (07/02/2013), Confusional state (03/10/2024), Contact with and (suspected) exposure to covid-19 (04/04/2023), Delirium (03/10/2024), Disease of pericardium, unspecified ([facility]) (07/02/2013), Encounter for follow-up examination after completed treatment for conditions other than malignant neoplasm (10/06/2022), Generalized contraction of visual field, right eye (01/29/2015), Hearing loss (03/10/2024), History of cataract (03/10/2024), History of thrombocytopenia (03/10/2024), Homonymous bilateral field defects, right side (04/29/2016), Hypertensive chronic kidney disease with stage 1 through stage 4 chronic kidney disease, or unspecified chronic kidney disease (07/02/2013), Laceration without foreign body, left foot, initial encounter (07/03/2018), Localized edema (03/10/2024), Mass of skin (03/10/2024), Migraine with aura, not  intractable, without status migrainosus (10/24/2022), Open wound (03/10/2024), Open wound of left foot (03/10/2024), Other conditions influencing health status (07/02/2013), Other conditions influencing health status (07/02/2013), Other conditions influencing health status (07/02/2013), Other conditions influencing health status (05/22/2015), Other conditions influencing health status (10/24/2022), Other conditions influencing health status (03/14/2022), Other long term (current) drug therapy (10/24/2022), Overweight with body mass index (BMI) 25.0-29.9 (03/10/2024), Pain of toe (03/10/2024), Personal history of COVID-19 (04/04/2023), Personal history of diseases of the blood and blood-forming organs and certain disorders involving the immune mechanism (07/02/2013), Personal history of diseases of the skin and subcutaneous tissue (08/11/2015), Personal history of nephrotic syndrome (07/02/2013), Personal history of other diseases of the circulatory system (08/27/2013), Personal history of other diseases of the nervous system and sense organs, Personal history of other diseases of the respiratory system, Personal history of other infectious and parasitic diseases (07/02/2013), Personal history of other specified conditions, Personal history of other specified conditions (08/27/2013), Posterior epistaxis (03/10/2024), Puckering of macula, right eye (10/24/2022), Raynaud's syndrome without gangrene (07/02/2013), Sinusitis (03/10/2024), Systemic lupus erythematosus, unspecified (Multi) (07/24/2015), Systemic lupus erythematosus, unspecified (Multi) (07/24/2015), Systemic lupus erythematosus, unspecified (Multi) (07/24/2015), 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) (07/02/2013), Type 2 diabetes mellitus with mild nonproliferative diabetic retinopathy without macular edema, left eye (Multi) (07/27/2015), Type 2 diabetes mellitus with mild nonproliferative diabetic retinopathy without macular edema,  unspecified eye (Multi) (07/24/2015), Type 2 diabetes mellitus with proliferative diabetic retinopathy without macular edema, right eye (Multi) (07/27/2015), Type 2 diabetes mellitus with proliferative diabetic retinopathy without macular edema, unspecified eye (Multi) (07/24/2015), Unspecified acute and subacute iridocyclitis (07/24/2015), and Unspecified open wound, left foot, sequela (07/03/2018).    Surgical History  She has a past surgical history that includes Eye surgery (03/06/2015); Total hip arthroplasty (01/29/2015); Cholecystectomy (01/29/2015); Other surgical history (01/29/2015); Other surgical history (01/29/2015); Ankle surgery (01/29/2015); Foot surgery (01/29/2015); MR angio head wo IV contrast (7/26/2013); MR angio neck wo IV contrast (7/26/2013); CT guided percutaneous biopsy bone deep (5/4/2021); MR angio head wo IV contrast (9/17/2021); MR angio neck wo IV contrast (9/17/2021); MR angio neck wo IV contrast (3/25/2023); and MR angio head wo IV contrast (3/25/2023).     Social History  She reports that she has never smoked. She has never been exposed to tobacco smoke. She has never used smokeless tobacco. She reports that she does not currently use alcohol. She reports that she does not use drugs.    Family History  Family History   Problem Relation Name Age of Onset    Other (RENAL DISEASE) Mother          END STAGE    Other (CARDIAC DISORDER) Mother      Cataracts Mother      Stroke Mother      Diabetes Mother      Kidney disease Mother      Lupus Mother      Other (CARDIAC DISORDER) Father      COPD Father      Glaucoma Father      Hypertension Father      Sleep apnea Father      Other (CARDIAC DISORDER) Sister      Depression Sister      Kidney disease Sister      Sickle cell trait Sister      Sleep apnea Daughter          Allergies  Ace inhibitors, Hydroxychloroquine, Lisinopril, Penicillins, and Sulfa (sulfonamide antibiotics)    Review of Systems  Review of Systems   Reason unable to  "perform ROS: alert and oriented only to name.          Physical Exam  Physical Exam  Constitutional:       Comments: Appears comfortable, rouses from sleep to name   HENT:      Head: Normocephalic.      Mouth/Throat:      Mouth: Mucous membranes are moist.      Pharynx: Oropharynx is clear.   Cardiovascular:      Rate and Rhythm: Normal rate and regular rhythm.   Pulmonary:      Breath sounds: Wheezing present.   Abdominal:      General: Abdomen is flat. Bowel sounds are normal.      Palpations: Abdomen is soft.   Musculoskeletal:      Comments: Left elbow, anticubital IV present, otherwise skin intact, no erythema/edema present. No pain on palpation left elbow.  Pt denies pain when demonstrating full ROM left elbow. Note left finger deformities consistent with Jaccoud arthropathy. Left radial pulse 2+/2 on palp   Neurological:      Mental Status: She is alert. She is disoriented.            Last Recorded Vitals  Blood pressure 168/80, pulse 93, temperature 36.1 °C (97 °F), temperature source Temporal, resp. rate 18, height 1.702 m (5' 7\"), weight 90.9 kg (200 lb 6.4 oz), SpO2 96%.    Relevant Results      Scheduled medications  apixaban, 2.5 mg, oral, BID  atorvastatin, 40 mg, oral, Daily  cefTRIAXone, 2 g, intravenous, q24h  famotidine, 20 mg, intravenous, q24h GERALD  folic acid, 1 mg, oral, Daily  hydrocortisone sodium succinate, 20 mg, intravenous, q12h  levETIRAcetam, 500 mg, intravenous, q12h  lidocaine, 5 mL, infiltration, Once  melatonin, 5 mg, oral, Nightly  metroNIDAZOLE, 500 mg, intravenous, q8h  multivitamin with minerals, 1 tablet, oral, Daily  [Held by provider] mycophenolate, 1,000 mg, oral, BID  [Held by provider] NIFEdipine ER, 60 mg, oral, Daily before breakfast  OLANZapine, 5 mg, oral, BID  pantoprazole, 40 mg, intravenous, Daily  [Held by provider] predniSONE, 20 mg, oral, Daily  risperiDONE, 0.5 mg, oral, Nightly  thiamine, 100 mg, oral, Daily      Continuous medications     PRN medications  PRN " medications: dextrose, dextrose, glucagon, glucagon, HYDROmorphone, ipratropium-albuteroL  Results for orders placed or performed during the hospital encounter of 04/24/24 (from the past 24 hour(s))   POCT GLUCOSE   Result Value Ref Range    POCT Glucose 86 74 - 99 mg/dL   RSV PCR   Result Value Ref Range    RSV PCR Not Detected Not Detected   Sars-CoV-2 and Influenza A/B PCR   Result Value Ref Range    Flu A Result Not Detected Not Detected    Flu B Result Not Detected Not Detected    Coronavirus 2019, PCR Not Detected Not Detected   ECG 12 lead   Result Value Ref Range    Ventricular Rate 90 BPM    Atrial Rate 90 BPM    [place] Interval 156 ms    QRS Duration 92 ms    QT Interval 332 ms    QTC Calculation(Bazett) 406 ms    P Axis 55 degrees    R Axis -11 degrees    T Axis 150 degrees    QRS Count 15 beats    Q Onset 215 ms    P Onset 137 ms    P Offset 197 ms    T Offset 381 ms    QTC Fredericia 380 ms   POCT GLUCOSE   Result Value Ref Range    POCT Glucose 72 (L) 74 - 99 mg/dL   POCT GLUCOSE   Result Value Ref Range    POCT Glucose 87 74 - 99 mg/dL   POCT GLUCOSE   Result Value Ref Range    POCT Glucose 97 74 - 99 mg/dL   POCT GLUCOSE   Result Value Ref Range    POCT Glucose 106 (H) 74 - 99 mg/dL   POCT GLUCOSE   Result Value Ref Range    POCT Glucose 88 74 - 99 mg/dL          XR elbow left 3+ views    Result Date: 4/26/2024  Interpreted By:  Jose A Anderson, STUDY: XR ELBOW LEFT 3+ VIEWS; ;  4/26/2024 3:02 pm   INDICATION: Signs/Symptoms:pain.   COMPARISON: None.   ACCESSION NUMBER(S): [id number]   ORDERING CLINICIAN: VINCENT VARELA   FINDINGS: Small anterior and posterior effusions. No definite fracture visualized. No significant soft tissue swelling. No radiopaque foreign body. Mild degenerative changes.       Small effusions without visualized fracture. Mild degenerative changes of the elbow.     MACRO: None   Signed by: Jose A Anderson 4/26/2024 3:41 PM Dictation workstation:   [id number]    ECG 12  lead    Result Date: 4/26/2024  Normal sinus rhythm Possible Left atrial enlargement Left ventricular hypertrophy with repolarization abnormality Abnormal ECG When compared with ECG of 20-MAR-2024 00:59, Vent. rate has increased BY  37 BPM    CT elbow left wo IV contrast    Result Date: 4/25/2024  Interpreted By:  Graciela Blevins, STUDY: CT ELBOW LEFT WO IV CONTRAST; ;  4/25/2024 8:31 pm   INDICATION: Signs/Symptoms:Self mutilation of antecubital fossa, joint tender to touch, looking for infection.   COMPARISON: None.   ACCESSION NUMBER(S): [id number]   ORDERING CLINICIAN: TU ZAMARRIPA   TECHNIQUE: Noncontrast CT images of the left elbow with axial, sagittal and coronal reconstructed images.   FINDINGS: No acute fracture or malalignment. 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 No erosions or destructive changes. There is a small elbow joint effusion. Otherwise, soft tissues are unremarkable. No soft tissue gas or radiopaque foreign body.       Nonspecific small elbow joint effusion. If there is clinical concern for septic arthritis, joint aspiration is recommended.   Otherwise, soft tissues are unremarkable.   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     MACRO: None   Signed by: Graciela Blevins 4/25/2024 9:54 PM Dictation workstation:   [id number]      Assessment/Plan     Left elbow pain  -benign exam  -imaging with small effusion, mild to mod OA, otherwise unremarkable  -no indication for aspiration  -WBAT left elbow, cleared from ortho standpoint to work with PT/OT  -may follow up as outpatient with Dr. Bailee Galindo as needed    Maya Call PA-C    Patient seen and examined this morning.  Benign elbow exam with full range of motion.  No short arc pain.  She does have evidence of arthritis on x-ray.  No joint effusion appreciable on exam.  Will defer aspiration at this time based on no effusion.  Medial based pain could be from underlying arthritis.  This is not reproducible on my exam today.  Would  continue infectious workup as I have low suspicion for elbow as source for elevated CRP.  Weight-bear as tolerated left elbow.  Can follow-up with me as needed.      Bailee Galindo MD

## 2024-04-27 NOTE — CARE PLAN
The patient's goals for the shift include      The clinical goals for the shift include NO FALLS no further hallucination or sucidal ideation.  Problem: Diabetes  Goal: Achieve decreasing blood glucose levels by end of shift  4/27/2024 0536 by Emerita Encinas RN  Outcome: Progressing  4/27/2024 0534 by Emerita Encinas RN  Outcome: Progressing  Goal: Increase stability of blood glucose readings by end of shift  4/27/2024 0536 by Emerita Encinas RN  Outcome: Progressing  4/27/2024 0534 by Emerita Encinas RN  Outcome: Progressing  Goal: Decrease in ketones present in urine by end of shift  4/27/2024 0536 by Emerita Encinas RN  Outcome: Progressing  4/27/2024 0534 by Emerita Encinas RN  Outcome: Progressing  Goal: Maintain electrolyte levels within acceptable range throughout shift  Outcome: Progressing  Goal: Maintain glucose levels >70mg/dl to <250mg/dl throughout shift  Outcome: Progressing  Goal: No changes in neurological exam by end of shift  Outcome: Progressing  Goal: Vital signs within normal range for age by end of shift  Outcome: Progressing  Goal: Increase self care and/or family involovement by end of shift  Outcome: Progressing     Problem: Skin  Goal: Decreased wound size/increased tissue granulation at next dressing change  Outcome: Progressing  Goal: Participates in plan/prevention/treatment measures  Outcome: Progressing  Goal: Prevent/manage excess moisture  Outcome: Progressing  Goal: Prevent/minimize sheer/friction injuries  Outcome: Progressing  Goal: Promote/optimize nutrition  Outcome: Progressing  Goal: Promote skin healing  Outcome: Progressing     Problem: Fall/Injury  Goal: Not fall by end of shift  4/27/2024 0536 by Emerita Encinas RN  Outcome: Progressing  4/27/2024 0534 by Emerita Encinas RN  Outcome: Progressing  Goal: Be free from injury by end of the shift  4/27/2024 0536 by Emerita Encinas RN  Outcome: Progressing  4/27/2024 0534 by Emerita Encinas RN  Outcome: Progressing  Goal: Verbalize understanding  of personal risk factors for fall in the hospital  4/27/2024 0536 by Emerita Encinas RN  Outcome: Progressing  4/27/2024 0534 by Emerita Encinas RN  Outcome: Progressing  Goal: Verbalize understanding of risk factor reduction measures to prevent injury from fall in the home  4/27/2024 0536 by Emerita Encinas RN  Outcome: Progressing  4/27/2024 0534 by Emerita Encinas RN  Outcome: Progressing  Goal: Use assistive devices by end of the shift  4/27/2024 0536 by Emerita Encinas RN  Outcome: Progressing  4/27/2024 0534 by Emerita Encinas RN  Outcome: Progressing  Goal: Pace activities to prevent fatigue by end of the shift  Outcome: Progressing     Problem: Pain  Goal: Takes deep breaths with improved pain control throughout the shift  4/27/2024 0536 by Emerita Encinas RN  Outcome: Progressing  4/27/2024 0534 by Emerita Encinas RN  Outcome: Progressing  Goal: Turns in bed with improved pain control throughout the shift  4/27/2024 0536 by Emerita Encinas RN  Outcome: Progressing  4/27/2024 0534 by Emerita Encinas RN  Outcome: Progressing  Goal: Walks with improved pain control throughout the shift  4/27/2024 0536 by Emerita Encinas RN  Outcome: Progressing  4/27/2024 0534 by Emerita Encinas RN  Outcome: Progressing  Goal: Performs ADL's with improved pain control throughout shift  Outcome: Progressing  Goal: Free from opioid side effects throughout the shift  4/27/2024 0536 by Emerita Encinas RN  Outcome: Progressing  4/27/2024 0534 by Emerita Encinas RN  Outcome: Progressing  Goal: Free from acute confusion related to pain meds throughout the shift  4/27/2024 0536 by Emerita Encinas RN  Outcome: Progressing  4/27/2024 0534 by Emerita Encinas RN  Outcome: Progressing

## 2024-04-27 NOTE — PROGRESS NOTES
"Bing Holliday is a 63 y.o. female on day 2 of admission presenting with Hypoglycemia.    SUBJECTIVE:    Pt appeared more lethargic this morning  Did not take zyprexa  Slept better last night  No VH this morning  Unable to elicit much information from the patient due to her being lethargic      Exam:  Vital Signs: /74 (BP Location: Right arm, Patient Position: Lying)   Pulse 77   Temp 36 °C (96.8 °F) (Temporal)   Resp 16   Ht 1.702 m (5' 7\")   Wt 90.9 kg (200 lb 6.4 oz)   LMP  (LMP Unknown)   SpO2 92%   BMI 31.39 kg/m²   Musculoskeletal:  normal  Gait/Station: in bed      Mental Status Exam:  VH++  Cognition:  Insight: poor  Judgment: poor     Results for orders placed or performed during the hospital encounter of 04/24/24 (from the past 96 hour(s))   POCT GLUCOSE   Result Value Ref Range    POCT Glucose 53 (L) 74 - 99 mg/dL   CBC and Auto Differential   Result Value Ref Range    WBC 2.0 (L) 4.4 - 11.3 x10*3/uL    nRBC 0.0 0.0 - 0.0 /100 WBCs    RBC 2.28 (L) 4.00 - 5.20 x10*6/uL    Hemoglobin 7.0 (L) 12.0 - 16.0 g/dL    Hematocrit 23.2 (L) 36.0 - 46.0 %     (H) 80 - 100 fL    MCH 30.7 26.0 - 34.0 pg    MCHC 30.2 (L) 32.0 - 36.0 g/dL    RDW 19.1 (H) 11.5 - 14.5 %    Platelets 59 (L) 150 - 450 x10*3/uL    Neutrophils % 66.0 40.0 - 80.0 %    Immature Granulocytes %, Automated 0.5 0.0 - 0.9 %    Lymphocytes % 28.1 13.0 - 44.0 %    Monocytes % 4.4 2.0 - 10.0 %    Eosinophils % 1.0 0.0 - 6.0 %    Basophils % 0.0 0.0 - 2.0 %    Neutrophils Absolute 1.34 1.20 - 7.70 x10*3/uL    Immature Granulocytes Absolute, Automated 0.01 0.00 - 0.70 x10*3/uL    Lymphocytes Absolute 0.57 (L) 1.20 - 4.80 x10*3/uL    Monocytes Absolute 0.09 (L) 0.10 - 1.00 x10*3/uL    Eosinophils Absolute 0.02 0.00 - 0.70 x10*3/uL    Basophils Absolute 0.00 0.00 - 0.10 x10*3/uL   Comprehensive metabolic panel   Result Value Ref Range    Glucose 52 (LL) 74 - 99 mg/dL    Sodium 147 (H) 136 - 145 mmol/L    Potassium 4.6 3.5 - 5.3 mmol/L " Tobacco use disorder, motivational interviewing performed.      Chloride 118 (H) 98 - 107 mmol/L    Bicarbonate 22 21 - 32 mmol/L    Anion Gap 12 10 - 20 mmol/L    Urea Nitrogen 44 (H) 6 - 23 mg/dL    Creatinine 1.96 (H) 0.50 - 1.05 mg/dL    eGFR 28 (L) >60 mL/min/1.73m*2    Calcium 8.2 (L) 8.6 - 10.3 mg/dL    Albumin 2.9 (L) 3.4 - 5.0 g/dL    Alkaline Phosphatase 98 33 - 136 U/L    Total Protein 5.4 (L) 6.4 - 8.2 g/dL    AST 20 9 - 39 U/L    Bilirubin, Total 0.3 0.0 - 1.2 mg/dL    ALT 19 7 - 45 U/L   Magnesium   Result Value Ref Range    Magnesium 1.39 (L) 1.60 - 2.40 mg/dL   Urinalysis with Reflex Microscopic   Result Value Ref Range    Color, Urine Yellow Straw, Yellow    Appearance, Urine Hazy (N) Clear    Specific Gravity, Urine 1.014 1.005 - 1.035    pH, Urine 7.0 5.0, 5.5, 6.0, 6.5, 7.0, 7.5, 8.0    Protein, Urine 100 (2+) (N) NEGATIVE mg/dL    Glucose, Urine NEGATIVE NEGATIVE mg/dL    Blood, Urine NEGATIVE NEGATIVE    Ketones, Urine NEGATIVE NEGATIVE mg/dL    Bilirubin, Urine NEGATIVE NEGATIVE    Urobilinogen, Urine <2.0 <2.0 mg/dL    Nitrite, Urine NEGATIVE NEGATIVE    Leukocyte Esterase, Urine LARGE (3+) (A) NEGATIVE   BLOOD GAS VENOUS FULL PANEL   Result Value Ref Range    POCT pH, Venous 7.32 (L) 7.33 - 7.43 pH    POCT pCO2, Venous 42 41 - 51 mm Hg    POCT pO2, Venous 38 35 - 45 mm Hg    POCT SO2, Venous 52 45 - 75 %    POCT Oxy Hemoglobin, Venous 51.1 45.0 - 75.0 %    POCT Hematocrit Calculated, Venous 21.0 (L) 36.0 - 46.0 %    POCT Sodium, Venous 146 (H) 136 - 145 mmol/L    POCT Potassium, Venous 4.8 3.5 - 5.3 mmol/L    POCT Chloride, Venous 120 (H) 98 - 107 mmol/L    POCT Ionized Calicum, Venous 1.25 1.10 - 1.33 mmol/L    POCT Glucose, Venous 51 (LL) 74 - 99 mg/dL    POCT Lactate, Venous 1.0 0.4 - 2.0 mmol/L    POCT Base Excess, Venous -4.2 (L) -2.0 - 3.0 mmol/L    POCT HCO3 Calculated, Venous 21.6 (L) 22.0 - 26.0 mmol/L    POCT Hemoglobin, Venous 7.0 (L) 12.0 - 16.0 g/dL    POCT Anion Gap, Venous 9.0 (L) 10.0 - 25.0 mmol/L    Patient Temperature      FiO2 21 %    Urine Gray Tube   Result Value Ref Range    Extra Tube Hold for add-ons.    Microscopic Only, Urine   Result Value Ref Range    WBC, Urine >50 (A) 1-5, NONE /HPF    RBC, Urine 1-2 NONE, 1-2, 3-5 /HPF    Bacteria, Urine 1+ (A) NONE SEEN /HPF   Hemoglobin A1c   Result Value Ref Range    Hemoglobin A1C 8.4 (H) see below %    Estimated Average Glucose 194 Not Established mg/dL   POCT GLUCOSE   Result Value Ref Range    POCT Glucose 99 74 - 99 mg/dL   Protime-INR   Result Value Ref Range    Protime 11.5 9.8 - 12.8 seconds    INR 1.0 0.9 - 1.1   POCT GLUCOSE   Result Value Ref Range    POCT Glucose 63 (L) 74 - 99 mg/dL   POCT GLUCOSE   Result Value Ref Range    POCT Glucose 61 (L) 74 - 99 mg/dL   POCT GLUCOSE   Result Value Ref Range    POCT Glucose 70 (L) 74 - 99 mg/dL   POCT GLUCOSE   Result Value Ref Range    POCT Glucose 117 (H) 74 - 99 mg/dL   POCT GLUCOSE   Result Value Ref Range    POCT Glucose 106 (H) 74 - 99 mg/dL   POCT GLUCOSE   Result Value Ref Range    POCT Glucose 86 74 - 99 mg/dL   Renal function panel   Result Value Ref Range    Glucose 79 74 - 99 mg/dL    Sodium 143 136 - 145 mmol/L    Potassium 4.8 3.5 - 5.3 mmol/L    Chloride 118 (H) 98 - 107 mmol/L    Bicarbonate 20 (L) 21 - 32 mmol/L    Anion Gap 10 10 - 20 mmol/L    Urea Nitrogen 39 (H) 6 - 23 mg/dL    Creatinine 1.81 (H) 0.50 - 1.05 mg/dL    eGFR 31 (L) >60 mL/min/1.73m*2    Calcium 7.9 (L) 8.6 - 10.3 mg/dL    Phosphorus 3.1 2.5 - 4.9 mg/dL    Albumin 2.6 (L) 3.4 - 5.0 g/dL   POCT GLUCOSE   Result Value Ref Range    POCT Glucose 83 74 - 99 mg/dL   POCT GLUCOSE   Result Value Ref Range    POCT Glucose 59 (L) 74 - 99 mg/dL   POCT GLUCOSE   Result Value Ref Range    POCT Glucose 128 (H) 74 - 99 mg/dL   CBC   Result Value Ref Range    WBC 2.1 (L) 4.4 - 11.3 x10*3/uL    nRBC 1.0 (H) 0.0 - 0.0 /100 WBCs    RBC 2.11 (L) 4.00 - 5.20 x10*6/uL    Hemoglobin 6.4 (LL) 12.0 - 16.0 g/dL    Hematocrit 21.7 (L) 36.0 - 46.0 %     (H) 80 - 100 fL    MCH 30.3  26.0 - 34.0 pg    MCHC 29.5 (L) 32.0 - 36.0 g/dL    RDW 19.2 (H) 11.5 - 14.5 %    Platelets 49 (L) 150 - 450 x10*3/uL   Renal function panel   Result Value Ref Range    Glucose 112 (H) 74 - 99 mg/dL    Sodium 143 136 - 145 mmol/L    Potassium 4.3 3.5 - 5.3 mmol/L    Chloride 116 (H) 98 - 107 mmol/L    Bicarbonate 21 21 - 32 mmol/L    Anion Gap 10 10 - 20 mmol/L    Urea Nitrogen 35 (H) 6 - 23 mg/dL    Creatinine 1.64 (H) 0.50 - 1.05 mg/dL    eGFR 35 (L) >60 mL/min/1.73m*2    Calcium 7.9 (L) 8.6 - 10.3 mg/dL    Phosphorus 2.6 2.5 - 4.9 mg/dL    Albumin 2.4 (L) 3.4 - 5.0 g/dL   Magnesium   Result Value Ref Range    Magnesium 1.60 1.60 - 2.40 mg/dL   Lactate dehydrogenase   Result Value Ref Range     84 - 246 U/L   Reticulocytes   Result Value Ref Range    Retic % 0.4 (L) 0.5 - 2.0 %    Retic Absolute 0.009 (L) 0.018 - 0.083 x10*6/uL    Reticulocyte Hemoglobin 31 28 - 38 pg    Immature Retic fraction 10.7 <=16.0 %   Iron and TIBC   Result Value Ref Range    Iron 153 (H) 35 - 150 ug/dL    UIBC <55 (L) 110 - 370 ug/dL    TIBC      % Saturation     Ferritin   Result Value Ref Range    Ferritin 1,060 (H) 8 - 150 ng/mL   TSH with reflex to Free T4 if abnormal   Result Value Ref Range    Thyroid Stimulating Hormone 2.88 0.44 - 3.98 mIU/L   Folate   Result Value Ref Range    Folate, Serum >24.0 >5.0 ng/mL   Ammonia   Result Value Ref Range    Ammonia 25 16 - 53 umol/L   Lactate   Result Value Ref Range    Lactate 1.4 0.4 - 2.0 mmol/L   C-Peptide   Result Value Ref Range    C-Peptide 2.9 0.7 - 3.9 ng/mL   Lavender Top   Result Value Ref Range    Extra Tube Hold for add-ons.    POCT GLUCOSE   Result Value Ref Range    POCT Glucose 63 (L) 74 - 99 mg/dL   POCT GLUCOSE   Result Value Ref Range    POCT Glucose 112 (H) 74 - 99 mg/dL   Type and screen   Result Value Ref Range    ABO TYPE O     Rh TYPE POS     ANTIBODY SCREEN NEG    Prepare RBC: 1 Units, Leukocytes Reduced (CMV reduced risk)   Result Value Ref Range    PRODUCT  CODE [id number]     Unit Number [id number]     Unit ABO O     Unit RH POS     XM INTEP COMP     Dispense Status TR     Blood Expiration Date May 03, 2024 23:59 EDT     PRODUCT BLOOD TYPE 5100     UNIT VOLUME 350    POCT GLUCOSE   Result Value Ref Range    POCT Glucose 61 (L) 74 - 99 mg/dL   POCT GLUCOSE   Result Value Ref Range    POCT Glucose 131 (H) 74 - 99 mg/dL   Renal function panel   Result Value Ref Range    Glucose 105 (H) 74 - 99 mg/dL    Sodium 144 136 - 145 mmol/L    Potassium 4.4 3.5 - 5.3 mmol/L    Chloride 116 (H) 98 - 107 mmol/L    Bicarbonate 21 21 - 32 mmol/L    Anion Gap 11 10 - 20 mmol/L    Urea Nitrogen 32 (H) 6 - 23 mg/dL    Creatinine 1.62 (H) 0.50 - 1.05 mg/dL    eGFR 36 (L) >60 mL/min/1.73m*2    Calcium 8.3 (L) 8.6 - 10.3 mg/dL    Phosphorus 2.8 2.5 - 4.9 mg/dL    Albumin 2.8 (L) 3.4 - 5.0 g/dL   POCT GLUCOSE   Result Value Ref Range    POCT Glucose 132 (H) 74 - 99 mg/dL   Blood Culture    Specimen: Peripheral Venipuncture; Blood culture   Result Value Ref Range    Blood Culture No growth at 1 day    Blood Culture    Specimen: Peripheral Venipuncture; Blood culture   Result Value Ref Range    Blood Culture No growth at 1 day    POCT GLUCOSE   Result Value Ref Range    POCT Glucose 215 (H) 74 - 99 mg/dL   POCT GLUCOSE   Result Value Ref Range    POCT Glucose 115 (H) 74 - 99 mg/dL   POCT GLUCOSE   Result Value Ref Range    POCT Glucose 85 74 - 99 mg/dL   Renal Function Panel   Result Value Ref Range    Glucose 76 74 - 99 mg/dL    Sodium 146 (H) 136 - 145 mmol/L    Potassium 5.0 3.5 - 5.3 mmol/L    Chloride 118 (H) 98 - 107 mmol/L    Bicarbonate 21 21 - 32 mmol/L    Anion Gap 12 10 - 20 mmol/L    Urea Nitrogen 29 (H) 6 - 23 mg/dL    Creatinine 1.76 (H) 0.50 - 1.05 mg/dL    eGFR 32 (L) >60 mL/min/1.73m*2    Calcium 8.4 (L) 8.6 - 10.3 mg/dL    Phosphorus 3.3 2.5 - 4.9 mg/dL    Albumin 2.7 (L) 3.4 - 5.0 g/dL   Magnesium   Result Value Ref Range    Magnesium 2.15 1.60 - 2.40 mg/dL   CBC and Auto  Differential   Result Value Ref Range    WBC 3.1 (L) 4.4 - 11.3 x10*3/uL    nRBC 0.6 (H) 0.0 - 0.0 /100 WBCs    RBC 2.46 (L) 4.00 - 5.20 x10*6/uL    Hemoglobin 7.3 (L) 12.0 - 16.0 g/dL    Hematocrit 23.7 (L) 36.0 - 46.0 %    MCV 96 80 - 100 fL    MCH 29.7 26.0 - 34.0 pg    MCHC 30.8 (L) 32.0 - 36.0 g/dL    RDW 20.1 (H) 11.5 - 14.5 %    Platelets 62 (L) 150 - 450 x10*3/uL    Neutrophils % 62.4 40.0 - 80.0 %    Immature Granulocytes %, Automated 1.3 (H) 0.0 - 0.9 %    Lymphocytes % 28.0 13.0 - 44.0 %    Monocytes % 8.0 2.0 - 10.0 %    Eosinophils % 0.3 0.0 - 6.0 %    Basophils % 0.0 0.0 - 2.0 %    Neutrophils Absolute 1.94 1.20 - 7.70 x10*3/uL    Immature Granulocytes Absolute, Automated 0.04 0.00 - 0.70 x10*3/uL    Lymphocytes Absolute 0.87 (L) 1.20 - 4.80 x10*3/uL    Monocytes Absolute 0.25 0.10 - 1.00 x10*3/uL    Eosinophils Absolute 0.01 0.00 - 0.70 x10*3/uL    Basophils Absolute 0.00 0.00 - 0.10 x10*3/uL   C-reactive protein   Result Value Ref Range    C-Reactive Protein 7.29 (H) <1.00 mg/dL   Sedimentation Rate   Result Value Ref Range    Sedimentation Rate 41 (H) 0 - 30 mm/h   Morphology   Result Value Ref Range    RBC Morphology See Below     Ovalocytes Few     Katarzyna Cells Few    POCT GLUCOSE   Result Value Ref Range    POCT Glucose 82 74 - 99 mg/dL   POCT GLUCOSE   Result Value Ref Range    POCT Glucose 86 74 - 99 mg/dL   RSV PCR   Result Value Ref Range    RSV PCR Not Detected Not Detected   Sars-CoV-2 and Influenza A/B PCR   Result Value Ref Range    Flu A Result Not Detected Not Detected    Flu B Result Not Detected Not Detected    Coronavirus 2019, PCR Not Detected Not Detected   ECG 12 lead   Result Value Ref Range    Ventricular Rate 90 BPM    Atrial Rate 90 BPM    NC Interval 156 ms    QRS Duration 92 ms    QT Interval 332 ms    QTC Calculation(Bazett) 406 ms    P Axis 55 degrees    R Axis -11 degrees    T Axis 150 degrees    QRS Count 15 beats    Q Onset 215 ms    P Onset 137 ms    P Offset 197 ms    T  Offset 381 ms    QTC Fredericia 380 ms   Legionella Antigen, Urine    Specimen: Urine   Result Value Ref Range    L. pneumophila Urine Ag Negative Negative   Streptococcus pneumoniae Antigen, Urine    Specimen: Urine   Result Value Ref Range    Streptococcus pneumoniae Ag, Urine Positive (A) Negative   POCT GLUCOSE   Result Value Ref Range    POCT Glucose 72 (L) 74 - 99 mg/dL   POCT GLUCOSE   Result Value Ref Range    POCT Glucose 87 74 - 99 mg/dL   POCT GLUCOSE   Result Value Ref Range    POCT Glucose 97 74 - 99 mg/dL   POCT GLUCOSE   Result Value Ref Range    POCT Glucose 106 (H) 74 - 99 mg/dL   POCT GLUCOSE   Result Value Ref Range    POCT Glucose 88 74 - 99 mg/dL   POCT GLUCOSE   Result Value Ref Range    POCT Glucose 113 (H) 74 - 99 mg/dL           Impression:   Delirium    Recommendations:    1. zyprexa to 5 mg po bid- will decrease the dose tomorrow if she continue to remain lethargic  2. Reorienting the patient  3. Treat primary cause of confusion  Will continue to follow    Thank you for allowing us to participate in the care of this patient.       Yaniv Lee MD  4/27/2024  11:13 AM

## 2024-04-27 NOTE — PROGRESS NOTES
Bing Holliday is a 63 y.o. female on day 2 of admission presenting with Hypoglycemia.      Subjective   Seen and examined at bedside. Patient is more lethargic this AM, but is responsive to questions. She was awake all night, and did not sleep well. Her zyprexa was increased yesterday.     Objective     Last Recorded Vitals  /74 (BP Location: Right arm, Patient Position: Lying)   Pulse 77   Temp 36 °C (96.8 °F) (Temporal)   Resp 16   Wt 90.9 kg (200 lb 6.4 oz)   SpO2 92%   Intake/Output last 3 Shifts:    Intake/Output Summary (Last 24 hours) at 4/27/2024 1306  Last data filed at 4/27/2024 0537  Gross per 24 hour   Intake 2356.25 ml   Output 2 ml   Net 2354.25 ml       Admission Weight  Weight: 90.9 kg (200 lb 6.4 oz) (04/24/24 0953)    Daily Weight  04/24/24 : 90.9 kg (200 lb 6.4 oz)    Image Results  CT head wo IV contrast  Narrative: Interpreted By:  Graciela Blevins,   STUDY:  CT HEAD WO IV CONTRAST;  4/26/2024 5:14 pm      INDICATION:  Signs/Symptoms:Altered mental status.      COMPARISON:  03/20/2024      ACCESSION NUMBER(S):  [id number]      ORDERING CLINICIAN:  TU ZAMARRIPA      TECHNIQUE:  Axial noncontrast CT images of the head.      FINDINGS:  BRAIN PARENCHYMA: Stable encephalomalacia in the left occipital lobe.  Gray-white matter interfaces are otherwisepreserved. Calcifications  are in the basal ganglia.   deep and periventricular white matter  hypodensities are nonspecific, but favored to represent chronic small  vessel ischemic changes.  No mass effect or midline shift.      HEMORRHAGE: No acute intracranial hemorrhage.  VENTRICLES and EXTRA-AXIAL SPACES: The ventricles and sulci are  within normal limits in size for brain volume. No abnormal extraaxial  fluid collection. EXTRACRANIAL SOFT TISSUES: Within normal limits.  PARANASAL SINUSES/MASTOIDS:  The visualized paranasal sinuses and  mastoid air cells are aerated. CALVARIUM: No depressed skull  fracture. No destructive osseous  lesion.      OTHER FINDINGS: None.      Impression: No acute intracranial abnormality.      Stable chronic findings as above.      MACRO:  None      Signed by: Graciela Blevins 4/26/2024 6:56 PM  Dictation workstation:   [id number]  XR elbow left 3+ views  Narrative: Interpreted By:  Jose A Anderson,   STUDY:  XR ELBOW LEFT 3+ VIEWS; ;  4/26/2024 3:02 pm      INDICATION:  Signs/Symptoms:pain.      COMPARISON:  None.      ACCESSION NUMBER(S):  [id number]      ORDERING CLINICIAN:  VINCENT VARELA      FINDINGS:  Small anterior and posterior effusions. No definite fracture  visualized. No significant soft tissue swelling. No radiopaque  foreign body. Mild degenerative changes.      Impression: Small effusions without visualized fracture. Mild degenerative  changes of the elbow.          MACRO:  None      Signed by: Jose A Anderson 4/26/2024 3:41 PM  Dictation workstation:   [id number]  ECG 12 lead  Normal sinus rhythm  Possible Left atrial enlargement  Left ventricular hypertrophy with repolarization abnormality  Abnormal ECG  When compared with ECG of 20-MAR-2024 00:59,  Vent. rate has increased BY  37 BPM      Physical Exam  General: Not in acute distress, A&O x0, not alert but responds to verbal stimuli  HEENT: Normocephalic, atraumatic, EOMI, moist mucous membranes  Neck: Neck supple, trachea midline, no evidence of trauma  Cardiovascular: RRR, S1 and S2 appreciated, no murmurs rubs gallops appreciated, distal pulses 2+ bilaterally   Respiratory: Vesicular breath sounds appreciated bilaterally, no adventitious sounds appreciated, no increased work of breathing on RA  GI: Abdomen soft, nondistended, diffusely tender to palpation including when stethoscope placed on stomach, bowel sounds present  Extremities: 3+ non-pitting edema LLE 2+ pitting edema RLE  MSK: L elbow mildly TTP, scarring noted in antecubital fossa, full ROM  Neuro: A&Ox0, moves all extremities   Skin: Warm and dry, without lesions or  mary  Relevant Results    Results for orders placed or performed during the hospital encounter of 04/24/24 (from the past 24 hour(s))   Legionella Antigen, Urine    Specimen: Urine   Result Value Ref Range    L. pneumophila Urine Ag Negative Negative   Streptococcus pneumoniae Antigen, Urine    Specimen: Urine   Result Value Ref Range    Streptococcus pneumoniae Ag, Urine Positive (A) Negative   POCT GLUCOSE   Result Value Ref Range    POCT Glucose 72 (L) 74 - 99 mg/dL   POCT GLUCOSE   Result Value Ref Range    POCT Glucose 87 74 - 99 mg/dL   POCT GLUCOSE   Result Value Ref Range    POCT Glucose 97 74 - 99 mg/dL   POCT GLUCOSE   Result Value Ref Range    POCT Glucose 106 (H) 74 - 99 mg/dL   POCT GLUCOSE   Result Value Ref Range    POCT Glucose 88 74 - 99 mg/dL   POCT GLUCOSE   Result Value Ref Range    POCT Glucose 113 (H) 74 - 99 mg/dL   POCT GLUCOSE   Result Value Ref Range    POCT Glucose 128 (H) 74 - 99 mg/dL   CBC and Auto Differential   Result Value Ref Range    WBC 3.4 (L) 4.4 - 11.3 x10*3/uL    nRBC 0.6 (H) 0.0 - 0.0 /100 WBCs    RBC 2.19 (L) 4.00 - 5.20 x10*6/uL    Hemoglobin 6.6 (L) 12.0 - 16.0 g/dL    Hematocrit 22.0 (L) 36.0 - 46.0 %     (H) 80 - 100 fL    MCH 30.1 26.0 - 34.0 pg    MCHC 30.0 (L) 32.0 - 36.0 g/dL    RDW 20.4 (H) 11.5 - 14.5 %    Platelets 55 (L) 150 - 450 x10*3/uL    Neutrophils % 71.5 40.0 - 80.0 %    Immature Granulocytes %, Automated 3.6 (H) 0.0 - 0.9 %    Lymphocytes % 15.4 13.0 - 44.0 %    Monocytes % 8.6 2.0 - 10.0 %    Eosinophils % 0.6 0.0 - 6.0 %    Basophils % 0.3 0.0 - 2.0 %    Neutrophils Absolute 2.41 1.20 - 7.70 x10*3/uL    Immature Granulocytes Absolute, Automated 0.12 0.00 - 0.70 x10*3/uL    Lymphocytes Absolute 0.52 (L) 1.20 - 4.80 x10*3/uL    Monocytes Absolute 0.29 0.10 - 1.00 x10*3/uL    Eosinophils Absolute 0.02 0.00 - 0.70 x10*3/uL    Basophils Absolute 0.01 0.00 - 0.10 x10*3/uL   Magnesium   Result Value Ref Range    Magnesium 1.67 1.60 - 2.40 mg/dL    Comprehensive metabolic panel   Result Value Ref Range    Glucose 133 (H) 74 - 99 mg/dL    Sodium 144 136 - 145 mmol/L    Potassium 4.0 3.5 - 5.3 mmol/L    Chloride 118 (H) 98 - 107 mmol/L    Bicarbonate 20 (L) 21 - 32 mmol/L    Anion Gap 10 10 - 20 mmol/L    Urea Nitrogen 22 6 - 23 mg/dL    Creatinine 1.77 (H) 0.50 - 1.05 mg/dL    eGFR 32 (L) >60 mL/min/1.73m*2    Calcium 7.7 (L) 8.6 - 10.3 mg/dL    Albumin 2.5 (L) 3.4 - 5.0 g/dL    Alkaline Phosphatase 93 33 - 136 U/L    Total Protein 4.6 (L) 6.4 - 8.2 g/dL    AST 19 9 - 39 U/L    Bilirubin, Total 0.3 0.0 - 1.2 mg/dL    ALT 18 7 - 45 U/L   Morphology   Result Value Ref Range    RBC Morphology See Below     Polychromasia Mild     Ovalocytes Few     Lenorah Cells Few     Acanthocytes Few    Prepare RBC: 1 Units, Irradiated   Result Value Ref Range    PRODUCT CODE S8383G71     Unit Number [id number]     Unit ABO O     Unit RH POS     XM INTEP COMP     Dispense Status IS     Blood Expiration Date May 03, 2024 23:59 EDT     PRODUCT BLOOD TYPE 5100     UNIT VOLUME 350          CT elbow left wo IV contrast    Result Date: 4/25/2024  Interpreted By:  Graciela Blevins, STUDY: CT ELBOW LEFT WO IV CONTRAST; ;  4/25/2024 8:31 pm   INDICATION: Signs/Symptoms:Self mutilation of antecubital fossa, joint tender to touch, looking for infection.   COMPARISON: None.   ACCESSION NUMBER(S): [id number]   ORDERING CLINICIAN: TU ZAMARRIPA   TECHNIQUE: Noncontrast CT images of the left elbow with axial, sagittal and coronal reconstructed images.   FINDINGS: No acute fracture or malalignment. 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 No erosions or destructive changes. There is a small elbow joint effusion. Otherwise, soft tissues are unremarkable. No soft tissue gas or radiopaque foreign body.       Nonspecific small elbow joint effusion. If there is clinical concern for septic arthritis, joint aspiration is recommended.   Otherwise, soft tissues are unremarkable.   Mild-to-moderate  elbow osteoarthrosis.     MACRO: None   Signed by: Graciela Blevins 4/25/2024 9:54 PM Dictation workstation:   [id number]    CT abdomen pelvis wo IV contrast    Result Date: 4/25/2024  Interpreted By:  Graciela Blevins, STUDY: CT ABDOMEN PELVIS WO IV CONTRAST;  4/25/2024 8:31 pm   INDICATION: Signs/Symptoms:3 pt drop in hgb, AMS, worsening encephalopathy, hx of secondary adrenal insuficiency and hypoglycemia.   COMPARISON: 03/20/2024   ACCESSION NUMBER(S): [id number]   ORDERING CLINICIAN: TU ZAMARRIPA   TECHNIQUE: Axial noncontrast CT images of the abdomen and pelvis with coronal and sagittal reconstructed images.   FINDINGS: LOWER CHEST: New bilateral lower lobe airspace opacities. Cardiomegaly and coronary artery calcifications noted. BONES: No acute osseous abnormality. Diffuse degenerative disc changes and lumbar facet arthropathy. Right total hip arthroplasty. Severe left hip osteoarthrosis. ABDOMINAL WALL: Within normal limits.   ABDOMEN: Lack of intravenous contrast limits evaluation of vessels and solid organs. LIVER: Within normal limits. BILE DUCTS: No biliary dilatation. GALLBLADDER: Cholecystectomy. PANCREAS: No peripancreatic inflammatory stranding or duct dilatation. SPLEEN: Within normal limits. ADRENALS: Within normal limits.   KIDNEYS, URETERS, URINARY BLADDER: No hydronephrosis. 2 mm nonobstructing right renal calculus. Limited evaluation of the distal ureters due to streak artifact. No hydroureter. Evaluation of the urinary bladder due to streak artifact.   VESSELS: No aortic aneurysm. RETROPERITONEUM: No pathologically enlarged lymph nodes.   PELVIS:   REPRODUCTIVE ORGANS: No abnormality, given limitations of the noncontrast CT.   BOWEL: The stomach is mildly distended. No dilated small bowel. Colonic diverticulosis without acute diverticulitis. Normal appendix. PERITONEUM: No ascites or free air, no fluid collection.       No acute abdominal or pelvic process.   Nonspecific bilateral lower lobe  airspace opacities. Please correlate clinically to exclude pneumonia.   MACRO: None   Signed by: Graciela Blevins 4/25/2024 9:51 PM Dictation workstation:   [id number]    Bedside Peripheral IV Imaging    Result Date: 4/25/2024  These images are not reportable by radiology and will not be interpreted by  Radiologists.    Lower extremity venous duplex bilateral    Result Date: 4/25/2024            [facility] [id number] [address] [address]     Tel [phone] Fax [phone]  Vascular Lab Report  [facility] US LOWER EXTREMITY VENOUS DUPLEX BILATERAL Patient Name:      LISBET GUTIERRES      Reading Physician:  [id number] Enzo Ashley MD, RPVI Study Date:        4/25/2024             Ordering Provider:  [id number] HILDA JUAREZ MRN/PID:           [id number]              Fellow: Accession#:        [id number]          Technologist:       Beth Hannah RVT, [facility] Date of Birth/Age: 1960 / 63 years Technologist 2: Gender:            F                     Encounter#:         [id number] Admission Status:  Inpatient             Location Performed: [facility]  Diagnosis/ICD: Other specified soft tissue disorders-M79.89 Indication:    Limb swelling CPT Codes:     89932 Peripheral venous duplex scan for DVT complete  Pertinent History: COPD, Anticoagulation, AAA and LE Edema. Afib.  CONCLUSIONS: Right Lower Venous: No evidence of acute deep vein thrombus visualized in the right lower extremity. Left Lower Venous: No evidence of acute deep vein thrombus visualized in the left lower extremity. Additional Findings: Limited images due to patient's inability to tolerate compressions.  Comparison: Compared with study from 1/15/2024, no significant change.No  evidence of DVT.  Imaging & Doppler Findings:  Right                 Compressible Thrombus        Flow Distal External Iliac                None   Spontaneous/Phasic CFV                       Yes        None   Spontaneous/Phasic PFV                       Yes        None FV Proximal               Yes        None   Spontaneous/Phasic FV Mid                    Yes        None FV Distal                 Yes        None Popliteal                 Yes        None   Spontaneous/Phasic Peroneal                  Yes        None PTV                       Yes        None  Left                  Compress Thrombus        Flow Distal External Iliac            None   Spontaneous/Phasic CFV                     Yes      None   Spontaneous/Phasic PFV                     Yes      None FV Proximal             Yes      None   Spontaneous/Phasic FV Mid                  Yes      None FV Distal               Yes      None Popliteal               Yes      None   Spontaneous/Phasic Peroneal                Yes      None PTV                     Yes      None  [id number] Enzo Ashley MD, RPVI Electronically signed by [id number] Enzo Ashley MD, RPVI on 4/25/2024 at 1:07:18 PM  ** Final **     XR chest 1 view    Result Date: 4/24/2024  Interpreted By:  Mitesh Khan, STUDY: XR CHEST 1 VIEW;  4/24/2024 5:41 pm   INDICATION: Signs/Symptoms:cough.   COMPARISON: 03/19/2024   ACCESSION NUMBER(S): [id number]   ORDERING CLINICIAN: ENEIDA GRIER   FINDINGS:         CARDIOMEDIASTINAL SILHOUETTE: Cardiomediastinal silhouette is enlarged.   LUNGS: There is interval appearance of a right upper lobe patchy airspace disease concerning for pneumonia. The left lung is clear. There is no effusion   ABDOMEN: No remarkable upper abdominal findings.   BONES: No acute osseous changes.       1.  New right upper lobe airspace disease compatible with pneumonia in the proper clinical setting. Radiographic follow-up to resolution in 3-4 weeks is advised.       MACRO: None    Signed by: Mitesh Khan 4/24/2024 5:50 PM Dictation workstation:   [id number]      Scheduled medications  apixaban, 2.5 mg, oral, BID  atorvastatin, 40 mg, oral, Daily  cefTRIAXone, 2 g, intravenous, q24h  famotidine, 20 mg, intravenous, q24h GERALD  folic acid, 1 mg, oral, Daily  hydrocortisone sodium succinate, 20 mg, intravenous, q12h  levETIRAcetam, 500 mg, intravenous, q12h  lidocaine, 5 mL, infiltration, Once  melatonin, 5 mg, oral, Nightly  metroNIDAZOLE, 500 mg, intravenous, q8h  multivitamin with minerals, 1 tablet, oral, Daily  [Held by provider] mycophenolate, 1,000 mg, oral, BID  [Held by provider] NIFEdipine ER, 60 mg, oral, Daily before breakfast  OLANZapine, 5 mg, oral, BID  pantoprazole, 40 mg, intravenous, Daily  [Held by provider] predniSONE, 20 mg, oral, Daily  risperiDONE, 0.5 mg, oral, Nightly  thiamine, 100 mg, oral, Daily      Continuous medications  dextrose 10 % in water (D10W), 50 mL/hr, Last Rate: 50 mL/hr (04/27/24 1213)      PRN medications  PRN medications: dextrose, dextrose, glucagon, glucagon, HYDROmorphone, ipratropium-albuteroL   Assessment/Plan        Principal Problem:    Hypoglycemia        Malnutrition Diagnosis Status: New  Malnutrition Diagnosis: Moderate malnutrition related to acute disease or injury  As Evidenced by: significant weight loss of 5.6kg(5.8%) since last admit 30 days ago and refusing intakes for the last 3-4 days.  I agree with the dietitian's malnutrition diagnosis.    1.  Hypoglycemia in the setting of IDDM2, 2/2 chronic steroid use: Patient has a known history of hypoglycemia with multitudinous hospital presentations  Secondary AI  AI exacerbation suspect 2/2 to CAP  Patient [facility] to [facility] ED for chief complaint of hypoglycemia this is in the setting of decreased oral intake and still receiving her insulin at nursing facility.  -Continue hypoglycemia protocol  - Hold home insulin  - IV solucortef per endocrine, appreciate recoomendations  - Encourage  p.o. intake  - D10-LR due to patient refusing PO intake  - Q4 accuchecks   - Strep Pneumo Urine Ag positive, continue treatment for CAP     2. Lethargy likely 2/2 Antipsychotic use vs metabolic encephalopathy d/t infection   Suicidality vs encephalopathy   L antecubital fossa injury-self inflicted  - CT L elbow showed small effusion, CRP & ESR elevated from baseline, ortho consulted and low concern for septic joint at this time  Plan:  - 1:1 sitter  - Psychiatry consulted appreciate recs  Decrease zyprexa dosing tomorrow if continues to remain lethargic   - EKG for QT monitoring   -Treat underlying infection      3. 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 Atrial fibrillation on Eliquis  -Continue home Eliquis 2.5 mg twice daily  -Electrolytes optimized     5.HFrEF with Lower extremity swelling, (chronic)  TTE 3/10/2024: LVEF 40 to 45% and low normal RV systolic function.  Mild/moderate MR.  Moderately elevated RVSP of 49.5, global hypokinesis of left ventricle with minor regional variations   -Continue nifedipine  -Continue home atorvastatin 40 mg     6. Seizure Hx:  - Continue home Keppra 500 mg twice daily     7. COPD not in exacerbation with cough  Likely Community Acquired Pneumonia  UTI?  Vague abdominal pain  - Strep Pneumo Ag  positive  - DuoNebs every 6 hours prn  - CXR obtained showed new RUL opacity  - Urinalysis consistent with UTI, and patient is not a reliable historian    - COVID/FLU/RSV PCR negative  - BCNTD  Plan:  - Continue ceftriaxone 2 grams & flagyl due to PCN allergy       8.  GERD:  -Continue home pantoprazole 40 mg     9 Hx transient AMS with hallucinations:  -Continue home Risperdal     IVF: LR-D5W at 75 cc/HR  Diet: Diabetic  DVT prophylaxis: Continue home Eliquis  Dispo: worsening AI due to acute infection, anticipate 1-2 more [facility] stays  Consults: None  CODE STATUS: Full code       Case seen and discussed with attending  Steve Martinez DO  PGY-2 Internal Medicine

## 2024-04-27 NOTE — PROGRESS NOTES
04/27/24 1000   Discharge Planning   Living Arrangements Alone   Support Systems Family members   Assistance Needed yes   Type of Residence Nursing home/residential care   Do you have animals or pets at home? No   Home or Post Acute Services Post acute facilities (Rehab/SNF/etc)   Type of Post Acute Facility Services Long term care   Patient expects to be discharged to: [facility]   Does the patient need discharge transport arranged? Yes   RoundTrip coordination needed? Yes   Has discharge transport been arranged? No   Financial Resource Strain   How hard is it for you to pay for the very basics like food, housing, medical care, and heating? Not hard   Housing Stability   In the last 12 months, was there a time when you were not able to pay the mortgage or rent on time? N   In the last 12 months, how many places have you lived? 2   In the last 12 months, was there a time when you did not have a steady place to sleep or slept in a shelter (including now)? N   Transportation Needs   In the past 12 months, has lack of transportation kept you from medical appointments or from getting medications? no   In the past 12 months, has lack of transportation kept you from meetings, work, or from getting things needed for daily living? No   Patient Choice   Patient / Family choosing to utilize agency / facility established prior to hospitalization Yes     Return referral sent to [facility].

## 2024-04-28 LAB
ALBUMIN SERPL BCP-MCNC: 2.4 G/DL (ref 3.4–5)
ANION GAP SERPL CALC-SCNC: 11 MMOL/L (ref 10–20)
BUN SERPL-MCNC: 23 MG/DL (ref 6–23)
CALCIUM SERPL-MCNC: 7.7 MG/DL (ref 8.6–10.3)
CHLORIDE SERPL-SCNC: 117 MMOL/L (ref 98–107)
CO2 SERPL-SCNC: 21 MMOL/L (ref 21–32)
CREAT SERPL-MCNC: 1.7 MG/DL (ref 0.5–1.05)
EGFRCR SERPLBLD CKD-EPI 2021: 34 ML/MIN/1.73M*2
ERYTHROCYTE [DISTWIDTH] IN BLOOD BY AUTOMATED COUNT: 19.1 % (ref 11.5–14.5)
GLUCOSE BLD MANUAL STRIP-MCNC: 122 MG/DL (ref 74–99)
GLUCOSE BLD MANUAL STRIP-MCNC: 124 MG/DL (ref 74–99)
GLUCOSE BLD MANUAL STRIP-MCNC: 165 MG/DL (ref 74–99)
GLUCOSE BLD MANUAL STRIP-MCNC: 202 MG/DL (ref 74–99)
GLUCOSE BLD MANUAL STRIP-MCNC: 87 MG/DL (ref 74–99)
GLUCOSE SERPL-MCNC: 99 MG/DL (ref 74–99)
HCT VFR BLD AUTO: 24.1 % (ref 36–46)
HGB BLD-MCNC: 7.4 G/DL (ref 12–16)
MAGNESIUM SERPL-MCNC: 1.36 MG/DL (ref 1.6–2.4)
MCH RBC QN AUTO: 29.6 PG (ref 26–34)
MCHC RBC AUTO-ENTMCNC: 30.7 G/DL (ref 32–36)
MCV RBC AUTO: 96 FL (ref 80–100)
NRBC BLD-RTO: 0 /100 WBCS (ref 0–0)
PHOSPHATE SERPL-MCNC: 3.3 MG/DL (ref 2.5–4.9)
PLATELET # BLD AUTO: 63 X10*3/UL (ref 150–450)
POTASSIUM SERPL-SCNC: 3.6 MMOL/L (ref 3.5–5.3)
RBC # BLD AUTO: 2.5 X10*6/UL (ref 4–5.2)
SODIUM SERPL-SCNC: 145 MMOL/L (ref 136–145)
WBC # BLD AUTO: 3.7 X10*3/UL (ref 4.4–11.3)

## 2024-04-28 PROCEDURE — C9113 INJ PANTOPRAZOLE SODIUM, VIA: HCPCS

## 2024-04-28 PROCEDURE — 99232 SBSQ HOSP IP/OBS MODERATE 35: CPT | Performed by: PSYCHIATRY & NEUROLOGY

## 2024-04-28 PROCEDURE — 82947 ASSAY GLUCOSE BLOOD QUANT: CPT

## 2024-04-28 PROCEDURE — 2500000006 HC RX 250 W HCPCS SELF ADMINISTERED DRUGS (ALT 637 FOR ALL PAYERS): Performed by: PSYCHIATRY & NEUROLOGY

## 2024-04-28 PROCEDURE — 99233 SBSQ HOSP IP/OBS HIGH 50: CPT

## 2024-04-28 PROCEDURE — 2500000004 HC RX 250 GENERAL PHARMACY W/ HCPCS (ALT 636 FOR OP/ED)

## 2024-04-28 PROCEDURE — 2500000001 HC RX 250 WO HCPCS SELF ADMINISTERED DRUGS (ALT 637 FOR MEDICARE OP)

## 2024-04-28 PROCEDURE — 85027 COMPLETE CBC AUTOMATED: CPT

## 2024-04-28 PROCEDURE — 84100 ASSAY OF PHOSPHORUS: CPT

## 2024-04-28 PROCEDURE — 36415 COLL VENOUS BLD VENIPUNCTURE: CPT

## 2024-04-28 PROCEDURE — 83735 ASSAY OF MAGNESIUM: CPT

## 2024-04-28 PROCEDURE — 1200000002 HC GENERAL ROOM WITH TELEMETRY DAILY

## 2024-04-28 PROCEDURE — 2500000004 HC RX 250 GENERAL PHARMACY W/ HCPCS (ALT 636 FOR OP/ED): Performed by: INTERNAL MEDICINE

## 2024-04-28 RX ORDER — LANOLIN ALCOHOL/MO/W.PET/CERES
400 CREAM (GRAM) TOPICAL ONCE
Status: COMPLETED | OUTPATIENT
Start: 2024-04-28 | End: 2024-04-28

## 2024-04-28 RX ORDER — CEFDINIR 300 MG/1
300 CAPSULE ORAL 2 TIMES DAILY
Status: COMPLETED | OUTPATIENT
Start: 2024-04-28 | End: 2024-04-29

## 2024-04-28 RX ADMIN — Medication 5 MG: at 20:09

## 2024-04-28 RX ADMIN — ATORVASTATIN CALCIUM 40 MG: 40 TABLET, FILM COATED ORAL at 08:16

## 2024-04-28 RX ADMIN — HYDROCORTISONE SODIUM SUCCINATE 20 MG: 100 INJECTION, POWDER, FOR SOLUTION INTRAMUSCULAR; INTRAVENOUS at 17:28

## 2024-04-28 RX ADMIN — METRONIDAZOLE 500 MG: 500 INJECTION, SOLUTION INTRAVENOUS at 02:31

## 2024-04-28 RX ADMIN — LEVETIRACETAM 500 MG: 5 INJECTION INTRAVENOUS at 05:59

## 2024-04-28 RX ADMIN — THIAMINE HCL TAB 100 MG 100 MG: 100 TAB at 08:16

## 2024-04-28 RX ADMIN — FOLIC ACID 1 MG: 1 TABLET ORAL at 08:16

## 2024-04-28 RX ADMIN — APIXABAN 2.5 MG: 2.5 TABLET, FILM COATED ORAL at 20:09

## 2024-04-28 RX ADMIN — FAMOTIDINE 20 MG: 10 INJECTION, SOLUTION INTRAVENOUS at 08:17

## 2024-04-28 RX ADMIN — APIXABAN 2.5 MG: 2.5 TABLET, FILM COATED ORAL at 08:16

## 2024-04-28 RX ADMIN — HYDROCORTISONE SODIUM SUCCINATE 20 MG: 100 INJECTION, POWDER, FOR SOLUTION INTRAMUSCULAR; INTRAVENOUS at 05:59

## 2024-04-28 RX ADMIN — PANTOPRAZOLE SODIUM 40 MG: 40 INJECTION, POWDER, FOR SOLUTION INTRAVENOUS at 08:16

## 2024-04-28 RX ADMIN — Medication 1 TABLET: at 08:16

## 2024-04-28 RX ADMIN — LEVETIRACETAM 500 MG: 5 INJECTION INTRAVENOUS at 17:28

## 2024-04-28 RX ADMIN — CEFDINIR 300 MG: 300 CAPSULE ORAL at 12:02

## 2024-04-28 RX ADMIN — Medication 400 MG: at 12:02

## 2024-04-28 RX ADMIN — CEFDINIR 300 MG: 300 CAPSULE ORAL at 20:09

## 2024-04-28 RX ADMIN — OLANZAPINE 5 MG: 5 TABLET, FILM COATED ORAL at 08:16

## 2024-04-28 RX ADMIN — OLANZAPINE 5 MG: 5 TABLET, FILM COATED ORAL at 20:09

## 2024-04-28 RX ADMIN — HYDROMORPHONE HYDROCHLORIDE 0.2 MG: 0.2 INJECTION, SOLUTION INTRAMUSCULAR; INTRAVENOUS; SUBCUTANEOUS at 08:17

## 2024-04-28 ASSESSMENT — COGNITIVE AND FUNCTIONAL STATUS - GENERAL
MOBILITY SCORE: 10
CLIMB 3 TO 5 STEPS WITH RAILING: TOTAL
MOVING FROM LYING ON BACK TO SITTING ON SIDE OF FLAT BED WITH BEDRAILS: A LOT
DAILY ACTIVITIY SCORE: 11
MOVING TO AND FROM BED TO CHAIR: A LOT
STANDING UP FROM CHAIR USING ARMS: A LOT
HELP NEEDED FOR BATHING: A LOT
PERSONAL GROOMING: A LOT
DRESSING REGULAR UPPER BODY CLOTHING: A LOT
DRESSING REGULAR LOWER BODY CLOTHING: A LOT
WALKING IN HOSPITAL ROOM: TOTAL
TOILETING: A LOT
EATING MEALS: TOTAL
TURNING FROM BACK TO SIDE WHILE IN FLAT BAD: A LOT

## 2024-04-28 ASSESSMENT — PAIN - FUNCTIONAL ASSESSMENT
PAIN_FUNCTIONAL_ASSESSMENT: 0-10

## 2024-04-28 ASSESSMENT — PAIN DESCRIPTION - ORIENTATION: ORIENTATION: LEFT

## 2024-04-28 ASSESSMENT — PAIN SCALES - GENERAL
PAINLEVEL_OUTOF10: 5 - MODERATE PAIN
PAINLEVEL_OUTOF10: 8
PAINLEVEL_OUTOF10: 3

## 2024-04-28 ASSESSMENT — PAIN DESCRIPTION - LOCATION: LOCATION: ANKLE

## 2024-04-28 NOTE — PROGRESS NOTES
Bing Holliday is a 63 y.o. female on day 3 of admission presenting with Hypoglycemia.      Subjective   Seen and examined at bedside. No events overnight. Patient is more alert and awake this AM. She states she is not hungry but will try to eat this morning.     Objective     Last Recorded Vitals  /86 (BP Location: Left arm, Patient Position: Lying)   Pulse 66   Temp 35.5 °C (95.9 °F) (Temporal)   Resp 16   Wt 90.9 kg (200 lb 6.4 oz)   SpO2 92%   Intake/Output last 3 Shifts:    Intake/Output Summary (Last 24 hours) at 4/28/2024 1034  Last data filed at 4/27/2024 1823  Gross per 24 hour   Intake 902.08 ml   Output 400 ml   Net 502.08 ml       Admission Weight  Weight: 90.9 kg (200 lb 6.4 oz) (04/24/24 0953)    Daily Weight  04/24/24 : 90.9 kg (200 lb 6.4 oz)    Image Results  US right upper quadrant  Narrative: Interpreted By:  Graciela Blevins,   STUDY:  US RIGHT UPPER QUADRANT;  4/27/2024 5:24 pm      INDICATION:  Signs/Symptoms:looking for islet tumor.      COMPARISON:  Correlation with noncontrast CT abdomen pelvis 04/25/2024      ACCESSION NUMBER(S):  [id number]      ORDERING CLINICIAN:  TU ZAMARRIPA      TECHNIQUE:  Grayscale and color Doppler sonographic imaging of the right upper  quadrant.      FINDINGS:  LIVER: Normal size. Normal echogenicity, and echotexture. No focal  abnormalities.      BILE DUCTS: No intrahepatic or extrahepatic bile duct dilatation.  Extrahepatic bile duct = 5 mm.      GALLBLADDER: Status post cholecystectomy.      PANCREAS: Normal head and body. Limited evaluation of the pancreatic  tail due to bowel gas. No pancreatic mass is seen. Pancreatic duct is  within normal limits in size.      RIGHT KIDNEY: Normal size, no hydronephrosis.      PERITONEUM: No upper abdominal ascites.      Impression: Unremarkable right upper quadrant ultrasound.      No visualized pancreatic mass. Please note that ultrasound is limited  in sensitivity for pancreatic masses and further  evaluation with  nonemergent pancreas protocol MRI may be considered if clinically  indicated.      MACRO:  None      Signed by: Graciela Blevins 4/27/2024 6:47 PM  Dictation workstation:   [id number]      Physical Exam  General: Not in acute distress, A&O x 3, alert, well-developed,  HEENT: Normocephalic, atraumatic, EOMI, moist mucous membranes  Neck: Neck supple, trachea midline, no evidence of trauma  Cardiovascular: RRR, S1 and S2 appreciated, no murmurs rubs gallops appreciated, distal pulses 2+ bilaterally   Respiratory: Vesicular breath sounds appreciated bilaterally, no adventitious sounds appreciated, no increased work of breathing on RA  GI: Abdomen soft, nondistended, diffusely tender to palpation including when stethoscope placed on stomach, bowel sounds present  Extremities: 3+ non-pitting edema LLE 2+ pitting edema RLE  MSK: L elbow mildly TTP, scarring noted in antecubital fossa, full ROM  Neuro: A&O X3, no focal deficits, strength and sensation intact bilaterally, moving all extremities, withdrawing to pain which is diffuse and throughout with extremely light palpation.    Skin: Warm and dry, without lesions or rashes    Relevant Results    Results for orders placed or performed during the hospital encounter of 04/24/24 (from the past 24 hour(s))   POCT GLUCOSE   Result Value Ref Range    POCT Glucose 128 (H) 74 - 99 mg/dL   CBC and Auto Differential   Result Value Ref Range    WBC 3.4 (L) 4.4 - 11.3 x10*3/uL    nRBC 0.6 (H) 0.0 - 0.0 /100 WBCs    RBC 2.19 (L) 4.00 - 5.20 x10*6/uL    Hemoglobin 6.6 (L) 12.0 - 16.0 g/dL    Hematocrit 22.0 (L) 36.0 - 46.0 %     (H) 80 - 100 fL    MCH 30.1 26.0 - 34.0 pg    MCHC 30.0 (L) 32.0 - 36.0 g/dL    RDW 20.4 (H) 11.5 - 14.5 %    Platelets 55 (L) 150 - 450 x10*3/uL    Neutrophils % 71.5 40.0 - 80.0 %    Immature Granulocytes %, Automated 3.6 (H) 0.0 - 0.9 %    Lymphocytes % 15.4 13.0 - 44.0 %    Monocytes % 8.6 2.0 - 10.0 %    Eosinophils % 0.6 0.0 - 6.0 %     Basophils % 0.3 0.0 - 2.0 %    Neutrophils Absolute 2.41 1.20 - 7.70 x10*3/uL    Immature Granulocytes Absolute, Automated 0.12 0.00 - 0.70 x10*3/uL    Lymphocytes Absolute 0.52 (L) 1.20 - 4.80 x10*3/uL    Monocytes Absolute 0.29 0.10 - 1.00 x10*3/uL    Eosinophils Absolute 0.02 0.00 - 0.70 x10*3/uL    Basophils Absolute 0.01 0.00 - 0.10 x10*3/uL   Magnesium   Result Value Ref Range    Magnesium 1.67 1.60 - 2.40 mg/dL   Comprehensive metabolic panel   Result Value Ref Range    Glucose 133 (H) 74 - 99 mg/dL    Sodium 144 136 - 145 mmol/L    Potassium 4.0 3.5 - 5.3 mmol/L    Chloride 118 (H) 98 - 107 mmol/L    Bicarbonate 20 (L) 21 - 32 mmol/L    Anion Gap 10 10 - 20 mmol/L    Urea Nitrogen 22 6 - 23 mg/dL    Creatinine 1.77 (H) 0.50 - 1.05 mg/dL    eGFR 32 (L) >60 mL/min/1.73m*2    Calcium 7.7 (L) 8.6 - 10.3 mg/dL    Albumin 2.5 (L) 3.4 - 5.0 g/dL    Alkaline Phosphatase 93 33 - 136 U/L    Total Protein 4.6 (L) 6.4 - 8.2 g/dL    AST 19 9 - 39 U/L    Bilirubin, Total 0.3 0.0 - 1.2 mg/dL    ALT 18 7 - 45 U/L   Morphology   Result Value Ref Range    RBC Morphology See Below     Polychromasia Mild     Ovalocytes Few     Katarzyna Cells Few     Acanthocytes Few    Prepare RBC: 1 Units, Irradiated   Result Value Ref Range    PRODUCT CODE E9099L55     Unit Number [id number]     Unit ABO O     Unit RH POS     XM INTEP COMP     Dispense Status TR     Blood Expiration Date May 03, 2024 23:59 EDT     PRODUCT BLOOD TYPE 5100     UNIT VOLUME 350    POCT GLUCOSE   Result Value Ref Range    POCT Glucose 76 74 - 99 mg/dL   CBC   Result Value Ref Range    WBC 2.5 (L) 4.4 - 11.3 x10*3/uL    nRBC 1.2 (H) 0.0 - 0.0 /100 WBCs    RBC 2.83 (L) 4.00 - 5.20 x10*6/uL    Hemoglobin 8.4 (L) 12.0 - 16.0 g/dL    Hematocrit 27.6 (L) 36.0 - 46.0 %    MCV 98 80 - 100 fL    MCH 29.7 26.0 - 34.0 pg    MCHC 30.4 (L) 32.0 - 36.0 g/dL    RDW 19.0 (H) 11.5 - 14.5 %    Platelets 51 (L) 150 - 450 x10*3/uL   POCT GLUCOSE   Result Value Ref Range    POCT  Glucose 109 (H) 74 - 99 mg/dL   POCT GLUCOSE   Result Value Ref Range    POCT Glucose 87 74 - 99 mg/dL   Magnesium   Result Value Ref Range    Magnesium 1.36 (L) 1.60 - 2.40 mg/dL   Renal Function Panel   Result Value Ref Range    Glucose 99 74 - 99 mg/dL    Sodium 145 136 - 145 mmol/L    Potassium 3.6 3.5 - 5.3 mmol/L    Chloride 117 (H) 98 - 107 mmol/L    Bicarbonate 21 21 - 32 mmol/L    Anion Gap 11 10 - 20 mmol/L    Urea Nitrogen 23 6 - 23 mg/dL    Creatinine 1.70 (H) 0.50 - 1.05 mg/dL    eGFR 34 (L) >60 mL/min/1.73m*2    Calcium 7.7 (L) 8.6 - 10.3 mg/dL    Phosphorus 3.3 2.5 - 4.9 mg/dL    Albumin 2.4 (L) 3.4 - 5.0 g/dL   CBC   Result Value Ref Range    WBC 3.7 (L) 4.4 - 11.3 x10*3/uL    nRBC 0.0 0.0 - 0.0 /100 WBCs    RBC 2.50 (L) 4.00 - 5.20 x10*6/uL    Hemoglobin 7.4 (L) 12.0 - 16.0 g/dL    Hematocrit 24.1 (L) 36.0 - 46.0 %    MCV 96 80 - 100 fL    MCH 29.6 26.0 - 34.0 pg    MCHC 30.7 (L) 32.0 - 36.0 g/dL    RDW 19.1 (H) 11.5 - 14.5 %    Platelets 63 (L) 150 - 450 x10*3/uL   POCT GLUCOSE   Result Value Ref Range    POCT Glucose 122 (H) 74 - 99 mg/dL         CT elbow left wo IV contrast    Result Date: 4/25/2024  Interpreted By:  Graciela Blevins, STUDY: CT ELBOW LEFT WO IV CONTRAST; ;  4/25/2024 8:31 pm   INDICATION: Signs/Symptoms:Self mutilation of antecubital fossa, joint tender to touch, looking for infection.   COMPARISON: None.   ACCESSION NUMBER(S): [id number]   ORDERING CLINICIAN: TU ZAMARRIPA   TECHNIQUE: Noncontrast CT images of the left elbow with axial, sagittal and coronal reconstructed images.   FINDINGS: No acute fracture or malalignment. 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 No erosions or destructive changes. There is a small elbow joint effusion. Otherwise, soft tissues are unremarkable. No soft tissue gas or radiopaque foreign body.       Nonspecific small elbow joint effusion. If there is clinical concern for septic arthritis, joint aspiration is recommended.   Otherwise, soft tissues  are unremarkable.   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     MACRO: None   Signed by: Graciela Blevins 4/25/2024 9:54 PM Dictation workstation:   [id number]    CT abdomen pelvis wo IV contrast    Result Date: 4/25/2024  Interpreted By:  Graciela Blevins, STUDY: CT ABDOMEN PELVIS WO IV CONTRAST;  4/25/2024 8:31 pm   INDICATION: Signs/Symptoms:3 pt drop in hgb, AMS, worsening encephalopathy, hx of secondary adrenal insuficiency and hypoglycemia.   COMPARISON: 03/20/2024   ACCESSION NUMBER(S): [id number]   ORDERING CLINICIAN: TU ZAMARRIPA   TECHNIQUE: Axial noncontrast CT images of the abdomen and pelvis with coronal and sagittal reconstructed images.   FINDINGS: LOWER CHEST: New bilateral lower lobe airspace opacities. Cardiomegaly and coronary artery calcifications noted. BONES: No acute osseous abnormality. Diffuse degenerative disc changes and lumbar facet arthropathy. Right total hip arthroplasty. Severe left hip osteoarthrosis. ABDOMINAL WALL: Within normal limits.   ABDOMEN: Lack of intravenous contrast limits evaluation of vessels and solid organs. LIVER: Within normal limits. BILE DUCTS: No biliary dilatation. GALLBLADDER: Cholecystectomy. PANCREAS: No peripancreatic inflammatory stranding or duct dilatation. SPLEEN: Within normal limits. ADRENALS: Within normal limits.   KIDNEYS, URETERS, URINARY BLADDER: No hydronephrosis. 2 mm nonobstructing right renal calculus. Limited evaluation of the distal ureters due to streak artifact. No hydroureter. Evaluation of the urinary bladder due to streak artifact.   VESSELS: No aortic aneurysm. RETROPERITONEUM: No pathologically enlarged lymph nodes.   PELVIS:   REPRODUCTIVE ORGANS: No abnormality, given limitations of the noncontrast CT.   BOWEL: The stomach is mildly distended. No dilated small bowel. Colonic diverticulosis without acute diverticulitis. Normal appendix. PERITONEUM: No ascites or free air, no fluid collection.       No acute abdominal or pelvic process.    Nonspecific bilateral lower lobe airspace opacities. Please correlate clinically to exclude pneumonia.   MACRO: None   Signed by: Graciela Blevins 4/25/2024 9:51 PM Dictation workstation:   [id number]    Bedside Peripheral IV Imaging    Result Date: 4/25/2024  These images are not reportable by radiology and will not be interpreted by  Radiologists.    Lower extremity venous duplex bilateral    Result Date: 4/25/2024            [facility] [address] [place] [address]     Tel [phone] Fax [phone]  Vascular Lab Report  [facility] US LOWER EXTREMITY VENOUS DUPLEX BILATERAL Patient Name:      LISBET GUTIERRES      Reading Physician:  [id number] Enzo Ashley MD, RPVI Study Date:        4/25/2024             Ordering Provider:  [id number] HILDA JUAREZ MRN/PID:           [id number]              Fellow: Accession#:        [id number]          Technologist:       Beth Hannah RVT, [facility] [[email or web address]] Date of Birth/Age: 1960 / 63 years Technologist 2: Gender:            F                     Encounter#:         [id number] Admission Status:  Inpatient             Location Performed: [facility]  Diagnosis/ICD: Other specified soft tissue disorders-M79.89 Indication:    Limb swelling CPT Codes:     94087 Peripheral venous duplex scan for DVT complete  Pertinent History: COPD, Anticoagulation, AAA and LE Edema. Afib.  CONCLUSIONS: Right Lower Venous: No evidence of acute deep vein thrombus visualized in the right lower extremity. Left Lower Venous: No evidence of acute deep vein thrombus visualized in the left lower extremity. Additional Findings: Limited images due to patient's inability to tolerate compressions.  Comparison: Compared with study from  1/15/2024, no significant change.No evidence of DVT.  Imaging & Doppler Findings:  Right                 Compressible Thrombus        Flow Distal External Iliac                None   Spontaneous/Phasic CFV                       Yes        None   Spontaneous/Phasic PFV                       Yes        None FV Proximal               Yes        None   Spontaneous/Phasic FV Mid                    Yes        None FV Distal                 Yes        None Popliteal                 Yes        None   Spontaneous/Phasic Peroneal                  Yes        None PTV                       Yes        None  Left                  Compress Thrombus        Flow Distal External Iliac            None   Spontaneous/Phasic CFV                     Yes      None   Spontaneous/Phasic PFV                     Yes      None FV Proximal             Yes      None   Spontaneous/Phasic FV Mid                  Yes      None FV Distal               Yes      None Popliteal               Yes      None   Spontaneous/Phasic Peroneal                Yes      None PTV                     Yes      None  [id number] Enzo Ashley MD, RPVI Electronically signed by [id number] Enzo Ashley MD, RPVI on 4/25/2024 at 1:07:18 PM  ** Final **     XR chest 1 view    Result Date: 4/24/2024  Interpreted By:  Mitesh Khan, STUDY: XR CHEST 1 VIEW;  4/24/2024 5:41 pm   INDICATION: Signs/Symptoms:cough.   COMPARISON: 03/19/2024   ACCESSION NUMBER(S): [id number]   ORDERING CLINICIAN: ENEIDA GRIER   FINDINGS:         CARDIOMEDIASTINAL SILHOUETTE: Cardiomediastinal silhouette is enlarged.   LUNGS: There is interval appearance of a right upper lobe patchy airspace disease concerning for pneumonia. The left lung is clear. There is no effusion   ABDOMEN: No remarkable upper abdominal findings.   BONES: No acute osseous changes.       1.  New right upper lobe airspace disease compatible with pneumonia in the proper clinical setting. Radiographic follow-up to resolution in 3-4  weeks is advised.       MACRO: None   Signed by: Mitesh Khan 4/24/2024 5:50 PM Dictation workstation:   [id number]      Scheduled medications  apixaban, 2.5 mg, oral, BID  atorvastatin, 40 mg, oral, Daily  cefdinir, 300 mg, oral, BID  famotidine, 20 mg, intravenous, q24h GERALD  folic acid, 1 mg, oral, Daily  hydrocortisone sodium succinate, 20 mg, intravenous, q12h  levETIRAcetam, 500 mg, intravenous, q12h  lidocaine, 5 mL, infiltration, Once  magnesium oxide, 400 mg, oral, Once  melatonin, 5 mg, oral, Nightly  multivitamin with minerals, 1 tablet, oral, Daily  [Held by provider] mycophenolate, 1,000 mg, oral, BID  [Held by provider] NIFEdipine ER, 60 mg, oral, Daily before breakfast  OLANZapine, 5 mg, oral, BID  pantoprazole, 40 mg, intravenous, Daily  [Held by provider] predniSONE, 20 mg, oral, Daily  risperiDONE, 0.5 mg, oral, Nightly  thiamine, 100 mg, oral, Daily      Continuous medications       PRN medications  PRN medications: dextrose, dextrose, glucagon, glucagon, HYDROmorphone, ipratropium-albuteroL   Assessment/Plan        Principal Problem:    Hypoglycemia        Malnutrition Diagnosis Status: New  Malnutrition Diagnosis: Moderate malnutrition related to acute disease or injury  As Evidenced by: significant weight loss of 5.6kg(5.8%) since last admit 30 days ago and refusing intakes for the last 3-4 days.  I agree with the dietitian's malnutrition diagnosis.    1.  Hypoglycemia in the setting of IDDM2, 2/2 chronic steroid use: Patient has a known history of hypoglycemia with multitudinous hospital presentations  Secondary AI  AI exacerbation suspect 2/2 to CAP  Patient [facility] to [facility] ED for chief complaint of hypoglycemia this is in the setting of decreased oral intake and still receiving her insulin at nursing facility.  -Continue hypoglycemia protocol  - Hold home insulin  - IV solucortef per endocrine, appreciate recoomendations  - Encourage p.o. intake  - If good oral intake today, will  discontinue D10  - Q4 accuchecks   - Strep Pneumo Urine Ag positive, continue treatment for CAP       2. COPD not in exacerbation with cough  Likely Community Acquired Pneumonia  UTI?  Vague abdominal pain  - Strep Pneumo Ag positive  - DuoNebs every 6 hours prn  - CXR obtained showed new RUL opacity  - Urinalysis consistent with UTI, and patient is not a reliable historian    - COVID/FLU/RSV PCR negative  - BCNTD  Plan:  - Switch to oral Cefdinir 300mg BID for 2 days to complete a 7 day course of abx    3. Lethargy likely 2/2 Antipsychotic use vs metabolic encephalopathy d/t infection - improved   Suicidality vs encephalopathy   L antecubital fossa injury-self inflicted  - CT L elbow showed small effusion, CRP & ESR elevated from baseline, ortho consulted and low concern for septic joint at this time  Plan:  - 1:1 sitter  - Psychiatry consulted appreciate recs  - EKG for QT monitoring   -Treat underlying infection      4. Acute on Chronic Anemia  Macrocytic anemia  Abdominal pain   - HgB downtrend to 6.4 on admission, no overt signs of bleeding  - Consented and transfused 1 unit pRBCs, HgB incremented appropriately from 6.4 -> 7.3  -  CT abd/pelvis negative for acute pathology   - Anemia labs showed high ferritin, low reticulocyte index, macrocytosis, suspect anemia etiology is multifactorial   -04/27 Hgb 6.6 --> 8.4 following 1 uRBCs  Plan:  -Hgb 7.4 this AM  -Continue to monitor CBC     5. 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 Atrial fibrillation on Eliquis  -Continue home Eliquis 2.5 mg twice daily  -Electrolytes optimized     7.HFrEF with Lower extremity swelling, (chronic)  TTE 3/10/2024: LVEF 40 to 45% and low normal RV systolic function.  Mild/moderate MR.  Moderately elevated RVSP of 49.5, global hypokinesis of left ventricle with minor  regional variations   -Continue nifedipine  -Continue home atorvastatin 40 mg     8. Seizure Hx:  - Continue home Keppra 500 mg twice daily       9.  GERD:  -Continue home pantoprazole 40 mg     10. Hx transient AMS with hallucinations:  -Continue home Risperdal     IVF: LR-D10W at 50 cc/HR  Diet: Diabetic  DVT prophylaxis: Continue home Eliquis  Dispo: worsening AI due to acute infection, anticipate 1-2 more [facility] stays  Consults: None  CODE STATUS: Full code      Case seen and discussed with attending  Steve Martinez DO  PGY-2 Internal Medicine

## 2024-04-28 NOTE — PROGRESS NOTES
"Bing Holliday is a 63 y.o. female on day 3 of admission presenting with Hypoglycemia.    SUBJECTIVE:    Pt appeared better, alert and oriented x 3  Slept better  No active VH or AH  Smiling and interacting appropriately  No active SI or depressed      Exam:  Vital Signs: /86 (BP Location: Left arm, Patient Position: Lying)   Pulse 66   Temp 35.5 °C (95.9 °F) (Temporal)   Resp 16   Ht 1.702 m (5' 7\")   Wt 90.9 kg (200 lb 6.4 oz)   LMP  (LMP Unknown)   SpO2 92%   BMI 31.39 kg/m²   Musculoskeletal:  normal  Gait/Station: in bed      Mental Status Exam:  VH++  Cognition:  Insight: poor  Judgment: poor     Results for orders placed or performed during the hospital encounter of 04/24/24 (from the past 96 hour(s))   POCT GLUCOSE   Result Value Ref Range    POCT Glucose 61 (L) 74 - 99 mg/dL   POCT GLUCOSE   Result Value Ref Range    POCT Glucose 70 (L) 74 - 99 mg/dL   POCT GLUCOSE   Result Value Ref Range    POCT Glucose 117 (H) 74 - 99 mg/dL   POCT GLUCOSE   Result Value Ref Range    POCT Glucose 106 (H) 74 - 99 mg/dL   POCT GLUCOSE   Result Value Ref Range    POCT Glucose 86 74 - 99 mg/dL   Renal function panel   Result Value Ref Range    Glucose 79 74 - 99 mg/dL    Sodium 143 136 - 145 mmol/L    Potassium 4.8 3.5 - 5.3 mmol/L    Chloride 118 (H) 98 - 107 mmol/L    Bicarbonate 20 (L) 21 - 32 mmol/L    Anion Gap 10 10 - 20 mmol/L    Urea Nitrogen 39 (H) 6 - 23 mg/dL    Creatinine 1.81 (H) 0.50 - 1.05 mg/dL    eGFR 31 (L) >60 mL/min/1.73m*2    Calcium 7.9 (L) 8.6 - 10.3 mg/dL    Phosphorus 3.1 2.5 - 4.9 mg/dL    Albumin 2.6 (L) 3.4 - 5.0 g/dL   POCT GLUCOSE   Result Value Ref Range    POCT Glucose 83 74 - 99 mg/dL   POCT GLUCOSE   Result Value Ref Range    POCT Glucose 59 (L) 74 - 99 mg/dL   POCT GLUCOSE   Result Value Ref Range    POCT Glucose 128 (H) 74 - 99 mg/dL   CBC   Result Value Ref Range    WBC 2.1 (L) 4.4 - 11.3 x10*3/uL    nRBC 1.0 (H) 0.0 - 0.0 /100 WBCs    RBC 2.11 (L) 4.00 - 5.20 x10*6/uL    " Hemoglobin 6.4 (LL) 12.0 - 16.0 g/dL    Hematocrit 21.7 (L) 36.0 - 46.0 %     (H) 80 - 100 fL    MCH 30.3 26.0 - 34.0 pg    MCHC 29.5 (L) 32.0 - 36.0 g/dL    RDW 19.2 (H) 11.5 - 14.5 %    Platelets 49 (L) 150 - 450 x10*3/uL   Renal function panel   Result Value Ref Range    Glucose 112 (H) 74 - 99 mg/dL    Sodium 143 136 - 145 mmol/L    Potassium 4.3 3.5 - 5.3 mmol/L    Chloride 116 (H) 98 - 107 mmol/L    Bicarbonate 21 21 - 32 mmol/L    Anion Gap 10 10 - 20 mmol/L    Urea Nitrogen 35 (H) 6 - 23 mg/dL    Creatinine 1.64 (H) 0.50 - 1.05 mg/dL    eGFR 35 (L) >60 mL/min/1.73m*2    Calcium 7.9 (L) 8.6 - 10.3 mg/dL    Phosphorus 2.6 2.5 - 4.9 mg/dL    Albumin 2.4 (L) 3.4 - 5.0 g/dL   Magnesium   Result Value Ref Range    Magnesium 1.60 1.60 - 2.40 mg/dL   Lactate dehydrogenase   Result Value Ref Range     84 - 246 U/L   Reticulocytes   Result Value Ref Range    Retic % 0.4 (L) 0.5 - 2.0 %    Retic Absolute 0.009 (L) 0.018 - 0.083 x10*6/uL    Reticulocyte Hemoglobin 31 28 - 38 pg    Immature Retic fraction 10.7 <=16.0 %   Iron and TIBC   Result Value Ref Range    Iron 153 (H) 35 - 150 ug/dL    UIBC <55 (L) 110 - 370 ug/dL    TIBC      % Saturation     Ferritin   Result Value Ref Range    Ferritin 1,060 (H) 8 - 150 ng/mL   TSH with reflex to Free T4 if abnormal   Result Value Ref Range    Thyroid Stimulating Hormone 2.88 0.44 - 3.98 mIU/L   Folate   Result Value Ref Range    Folate, Serum >24.0 >5.0 ng/mL   Ammonia   Result Value Ref Range    Ammonia 25 16 - 53 umol/L   Lactate   Result Value Ref Range    Lactate 1.4 0.4 - 2.0 mmol/L   C-Peptide   Result Value Ref Range    C-Peptide 2.9 0.7 - 3.9 ng/mL   Lavender Top   Result Value Ref Range    Extra Tube Hold for add-ons.    POCT GLUCOSE   Result Value Ref Range    POCT Glucose 63 (L) 74 - 99 mg/dL   POCT GLUCOSE   Result Value Ref Range    POCT Glucose 112 (H) 74 - 99 mg/dL   Type and screen   Result Value Ref Range    ABO TYPE O     Rh TYPE POS      ANTIBODY SCREEN NEG    Prepare RBC: 1 Units, Leukocytes Reduced (CMV reduced risk)   Result Value Ref Range    PRODUCT CODE P5781H02     Unit Number [id number]     Unit ABO O     Unit RH POS     XM INTEP COMP     Dispense Status TR     Blood Expiration Date May 03, 2024 23:59 EDT     PRODUCT BLOOD TYPE 5100     UNIT VOLUME 350    POCT GLUCOSE   Result Value Ref Range    POCT Glucose 61 (L) 74 - 99 mg/dL   POCT GLUCOSE   Result Value Ref Range    POCT Glucose 131 (H) 74 - 99 mg/dL   Renal function panel   Result Value Ref Range    Glucose 105 (H) 74 - 99 mg/dL    Sodium 144 136 - 145 mmol/L    Potassium 4.4 3.5 - 5.3 mmol/L    Chloride 116 (H) 98 - 107 mmol/L    Bicarbonate 21 21 - 32 mmol/L    Anion Gap 11 10 - 20 mmol/L    Urea Nitrogen 32 (H) 6 - 23 mg/dL    Creatinine 1.62 (H) 0.50 - 1.05 mg/dL    eGFR 36 (L) >60 mL/min/1.73m*2    Calcium 8.3 (L) 8.6 - 10.3 mg/dL    Phosphorus 2.8 2.5 - 4.9 mg/dL    Albumin 2.8 (L) 3.4 - 5.0 g/dL   POCT GLUCOSE   Result Value Ref Range    POCT Glucose 132 (H) 74 - 99 mg/dL   Blood Culture    Specimen: Peripheral Venipuncture; Blood culture   Result Value Ref Range    Blood Culture No growth at 2 days    Blood Culture    Specimen: Peripheral Venipuncture; Blood culture   Result Value Ref Range    Blood Culture No growth at 2 days    POCT GLUCOSE   Result Value Ref Range    POCT Glucose 215 (H) 74 - 99 mg/dL   POCT GLUCOSE   Result Value Ref Range    POCT Glucose 115 (H) 74 - 99 mg/dL   POCT GLUCOSE   Result Value Ref Range    POCT Glucose 85 74 - 99 mg/dL   Renal Function Panel   Result Value Ref Range    Glucose 76 74 - 99 mg/dL    Sodium 146 (H) 136 - 145 mmol/L    Potassium 5.0 3.5 - 5.3 mmol/L    Chloride 118 (H) 98 - 107 mmol/L    Bicarbonate 21 21 - 32 mmol/L    Anion Gap 12 10 - 20 mmol/L    Urea Nitrogen 29 (H) 6 - 23 mg/dL    Creatinine 1.76 (H) 0.50 - 1.05 mg/dL    eGFR 32 (L) >60 mL/min/1.73m*2    Calcium 8.4 (L) 8.6 - 10.3 mg/dL    Phosphorus 3.3 2.5 - 4.9 mg/dL     Albumin 2.7 (L) 3.4 - 5.0 g/dL   Magnesium   Result Value Ref Range    Magnesium 2.15 1.60 - 2.40 mg/dL   CBC and Auto Differential   Result Value Ref Range    WBC 3.1 (L) 4.4 - 11.3 x10*3/uL    nRBC 0.6 (H) 0.0 - 0.0 /100 WBCs    RBC 2.46 (L) 4.00 - 5.20 x10*6/uL    Hemoglobin 7.3 (L) 12.0 - 16.0 g/dL    Hematocrit 23.7 (L) 36.0 - 46.0 %    MCV 96 80 - 100 fL    MCH 29.7 26.0 - 34.0 pg    MCHC 30.8 (L) 32.0 - 36.0 g/dL    RDW 20.1 (H) 11.5 - 14.5 %    Platelets 62 (L) 150 - 450 x10*3/uL    Neutrophils % 62.4 40.0 - 80.0 %    Immature Granulocytes %, Automated 1.3 (H) 0.0 - 0.9 %    Lymphocytes % 28.0 13.0 - 44.0 %    Monocytes % 8.0 2.0 - 10.0 %    Eosinophils % 0.3 0.0 - 6.0 %    Basophils % 0.0 0.0 - 2.0 %    Neutrophils Absolute 1.94 1.20 - 7.70 x10*3/uL    Immature Granulocytes Absolute, Automated 0.04 0.00 - 0.70 x10*3/uL    Lymphocytes Absolute 0.87 (L) 1.20 - 4.80 x10*3/uL    Monocytes Absolute 0.25 0.10 - 1.00 x10*3/uL    Eosinophils Absolute 0.01 0.00 - 0.70 x10*3/uL    Basophils Absolute 0.00 0.00 - 0.10 x10*3/uL   C-reactive protein   Result Value Ref Range    C-Reactive Protein 7.29 (H) <1.00 mg/dL   Sedimentation Rate   Result Value Ref Range    Sedimentation Rate 41 (H) 0 - 30 mm/h   Morphology   Result Value Ref Range    RBC Morphology See Below     Ovalocytes Few     Katarzyna Cells Few    POCT GLUCOSE   Result Value Ref Range    POCT Glucose 82 74 - 99 mg/dL   POCT GLUCOSE   Result Value Ref Range    POCT Glucose 86 74 - 99 mg/dL   RSV PCR   Result Value Ref Range    RSV PCR Not Detected Not Detected   Sars-CoV-2 and Influenza A/B PCR   Result Value Ref Range    Flu A Result Not Detected Not Detected    Flu B Result Not Detected Not Detected    Coronavirus 2019, PCR Not Detected Not Detected   ECG 12 lead   Result Value Ref Range    Ventricular Rate 90 BPM    Atrial Rate 90 BPM    MT Interval 156 ms    QRS Duration 92 ms    QT Interval 332 ms    QTC Calculation(Bazett) 406 ms    P Axis 55 degrees     R Axis -11 degrees    T Axis 150 degrees    QRS Count 15 beats    Q Onset 215 ms    P Onset 137 ms    P Offset 197 ms    T Offset 381 ms    QTC Fredericia 380 ms   Legionella Antigen, Urine    Specimen: Urine   Result Value Ref Range    L. pneumophila Urine Ag Negative Negative   Streptococcus pneumoniae Antigen, Urine    Specimen: Urine   Result Value Ref Range    Streptococcus pneumoniae Ag, Urine Positive (A) Negative   POCT GLUCOSE   Result Value Ref Range    POCT Glucose 72 (L) 74 - 99 mg/dL   POCT GLUCOSE   Result Value Ref Range    POCT Glucose 87 74 - 99 mg/dL   POCT GLUCOSE   Result Value Ref Range    POCT Glucose 97 74 - 99 mg/dL   POCT GLUCOSE   Result Value Ref Range    POCT Glucose 106 (H) 74 - 99 mg/dL   POCT GLUCOSE   Result Value Ref Range    POCT Glucose 88 74 - 99 mg/dL   POCT GLUCOSE   Result Value Ref Range    POCT Glucose 113 (H) 74 - 99 mg/dL   POCT GLUCOSE   Result Value Ref Range    POCT Glucose 128 (H) 74 - 99 mg/dL   CBC and Auto Differential   Result Value Ref Range    WBC 3.4 (L) 4.4 - 11.3 x10*3/uL    nRBC 0.6 (H) 0.0 - 0.0 /100 WBCs    RBC 2.19 (L) 4.00 - 5.20 x10*6/uL    Hemoglobin 6.6 (L) 12.0 - 16.0 g/dL    Hematocrit 22.0 (L) 36.0 - 46.0 %     (H) 80 - 100 fL    MCH 30.1 26.0 - 34.0 pg    MCHC 30.0 (L) 32.0 - 36.0 g/dL    RDW 20.4 (H) 11.5 - 14.5 %    Platelets 55 (L) 150 - 450 x10*3/uL    Neutrophils % 71.5 40.0 - 80.0 %    Immature Granulocytes %, Automated 3.6 (H) 0.0 - 0.9 %    Lymphocytes % 15.4 13.0 - 44.0 %    Monocytes % 8.6 2.0 - 10.0 %    Eosinophils % 0.6 0.0 - 6.0 %    Basophils % 0.3 0.0 - 2.0 %    Neutrophils Absolute 2.41 1.20 - 7.70 x10*3/uL    Immature Granulocytes Absolute, Automated 0.12 0.00 - 0.70 x10*3/uL    Lymphocytes Absolute 0.52 (L) 1.20 - 4.80 x10*3/uL    Monocytes Absolute 0.29 0.10 - 1.00 x10*3/uL    Eosinophils Absolute 0.02 0.00 - 0.70 x10*3/uL    Basophils Absolute 0.01 0.00 - 0.10 x10*3/uL   Magnesium   Result Value Ref Range    Magnesium  1.67 1.60 - 2.40 mg/dL   Comprehensive metabolic panel   Result Value Ref Range    Glucose 133 (H) 74 - 99 mg/dL    Sodium 144 136 - 145 mmol/L    Potassium 4.0 3.5 - 5.3 mmol/L    Chloride 118 (H) 98 - 107 mmol/L    Bicarbonate 20 (L) 21 - 32 mmol/L    Anion Gap 10 10 - 20 mmol/L    Urea Nitrogen 22 6 - 23 mg/dL    Creatinine 1.77 (H) 0.50 - 1.05 mg/dL    eGFR 32 (L) >60 mL/min/1.73m*2    Calcium 7.7 (L) 8.6 - 10.3 mg/dL    Albumin 2.5 (L) 3.4 - 5.0 g/dL    Alkaline Phosphatase 93 33 - 136 U/L    Total Protein 4.6 (L) 6.4 - 8.2 g/dL    AST 19 9 - 39 U/L    Bilirubin, Total 0.3 0.0 - 1.2 mg/dL    ALT 18 7 - 45 U/L   Morphology   Result Value Ref Range    RBC Morphology See Below     Polychromasia Mild     Ovalocytes Few     Katarzyna Cells Few     Acanthocytes Few    Prepare RBC: 1 Units, Irradiated   Result Value Ref Range    PRODUCT CODE M4281I55     Unit Number [id number]     Unit ABO O     Unit RH POS     XM INTEP COMP     Dispense Status TR     Blood Expiration Date May 03, 2024 23:59 EDT     PRODUCT BLOOD TYPE 5100     UNIT VOLUME 350    POCT GLUCOSE   Result Value Ref Range    POCT Glucose 76 74 - 99 mg/dL   CBC   Result Value Ref Range    WBC 2.5 (L) 4.4 - 11.3 x10*3/uL    nRBC 1.2 (H) 0.0 - 0.0 /100 WBCs    RBC 2.83 (L) 4.00 - 5.20 x10*6/uL    Hemoglobin 8.4 (L) 12.0 - 16.0 g/dL    Hematocrit 27.6 (L) 36.0 - 46.0 %    MCV 98 80 - 100 fL    MCH 29.7 26.0 - 34.0 pg    MCHC 30.4 (L) 32.0 - 36.0 g/dL    RDW 19.0 (H) 11.5 - 14.5 %    Platelets 51 (L) 150 - 450 x10*3/uL   POCT GLUCOSE   Result Value Ref Range    POCT Glucose 109 (H) 74 - 99 mg/dL   POCT GLUCOSE   Result Value Ref Range    POCT Glucose 87 74 - 99 mg/dL   Magnesium   Result Value Ref Range    Magnesium 1.36 (L) 1.60 - 2.40 mg/dL   Renal Function Panel   Result Value Ref Range    Glucose 99 74 - 99 mg/dL    Sodium 145 136 - 145 mmol/L    Potassium 3.6 3.5 - 5.3 mmol/L    Chloride 117 (H) 98 - 107 mmol/L    Bicarbonate 21 21 - 32 mmol/L    Anion Gap  11 10 - 20 mmol/L    Urea Nitrogen 23 6 - 23 mg/dL    Creatinine 1.70 (H) 0.50 - 1.05 mg/dL    eGFR 34 (L) >60 mL/min/1.73m*2    Calcium 7.7 (L) 8.6 - 10.3 mg/dL    Phosphorus 3.3 2.5 - 4.9 mg/dL    Albumin 2.4 (L) 3.4 - 5.0 g/dL   CBC   Result Value Ref Range    WBC 3.7 (L) 4.4 - 11.3 x10*3/uL    nRBC 0.0 0.0 - 0.0 /100 WBCs    RBC 2.50 (L) 4.00 - 5.20 x10*6/uL    Hemoglobin 7.4 (L) 12.0 - 16.0 g/dL    Hematocrit 24.1 (L) 36.0 - 46.0 %    MCV 96 80 - 100 fL    MCH 29.6 26.0 - 34.0 pg    MCHC 30.7 (L) 32.0 - 36.0 g/dL    RDW 19.1 (H) 11.5 - 14.5 %    Platelets 63 (L) 150 - 450 x10*3/uL   POCT GLUCOSE   Result Value Ref Range    POCT Glucose 122 (H) 74 - 99 mg/dL   POCT GLUCOSE   Result Value Ref Range    POCT Glucose 202 (H) 74 - 99 mg/dL           Impression:   Delirium- resolving    Recommendations:    1. zyprexa to 5 mg po bid-to be continued  2. DC risperdal  DC sitter  3. Can be discharged from psych standpoint when medically stable    Thank you for allowing us to participate in the care of this patient.       Yaniv Lee MD  4/28/2024  11:29 AM

## 2024-04-29 ENCOUNTER — APPOINTMENT (OUTPATIENT)
Dept: INFUSION THERAPY | Facility: CLINIC | Age: 64
End: 2024-04-29
Payer: MEDICARE

## 2024-04-29 LAB
ALBUMIN SERPL BCP-MCNC: 2.9 G/DL (ref 3.4–5)
ANION GAP SERPL CALC-SCNC: 15 MMOL/L (ref 10–20)
BACTERIA BLD CULT: NORMAL
BACTERIA BLD CULT: NORMAL
BACTERIA UR CULT: ABNORMAL
BUN SERPL-MCNC: 20 MG/DL (ref 6–23)
CALCIUM SERPL-MCNC: 8.2 MG/DL (ref 8.6–10.3)
CHLORIDE SERPL-SCNC: 117 MMOL/L (ref 98–107)
CO2 SERPL-SCNC: 20 MMOL/L (ref 21–32)
CREAT SERPL-MCNC: 1.6 MG/DL (ref 0.5–1.05)
EGFRCR SERPLBLD CKD-EPI 2021: 36 ML/MIN/1.73M*2
ERYTHROCYTE [DISTWIDTH] IN BLOOD BY AUTOMATED COUNT: 18.8 % (ref 11.5–14.5)
GLUCOSE BLD MANUAL STRIP-MCNC: 101 MG/DL (ref 74–99)
GLUCOSE BLD MANUAL STRIP-MCNC: 104 MG/DL (ref 74–99)
GLUCOSE BLD MANUAL STRIP-MCNC: 124 MG/DL (ref 74–99)
GLUCOSE BLD MANUAL STRIP-MCNC: 150 MG/DL (ref 74–99)
GLUCOSE BLD MANUAL STRIP-MCNC: 80 MG/DL (ref 74–99)
GLUCOSE BLD MANUAL STRIP-MCNC: 97 MG/DL (ref 74–99)
GLUCOSE SERPL-MCNC: 94 MG/DL (ref 74–99)
HCT VFR BLD AUTO: 28.6 % (ref 36–46)
HGB BLD-MCNC: 8.8 G/DL (ref 12–16)
MAGNESIUM SERPL-MCNC: 1.54 MG/DL (ref 1.6–2.4)
MCH RBC QN AUTO: 30.1 PG (ref 26–34)
MCHC RBC AUTO-ENTMCNC: 30.8 G/DL (ref 32–36)
MCV RBC AUTO: 98 FL (ref 80–100)
NRBC BLD-RTO: 2.8 /100 WBCS (ref 0–0)
PHOSPHATE SERPL-MCNC: 2.4 MG/DL (ref 2.5–4.9)
PLATELET # BLD AUTO: 69 X10*3/UL (ref 150–450)
POTASSIUM SERPL-SCNC: 3.7 MMOL/L (ref 3.5–5.3)
RBC # BLD AUTO: 2.92 X10*6/UL (ref 4–5.2)
SODIUM SERPL-SCNC: 148 MMOL/L (ref 136–145)
WBC # BLD AUTO: 4.6 X10*3/UL (ref 4.4–11.3)

## 2024-04-29 PROCEDURE — 97112 NEUROMUSCULAR REEDUCATION: CPT | Mod: GO

## 2024-04-29 PROCEDURE — 2500000004 HC RX 250 GENERAL PHARMACY W/ HCPCS (ALT 636 FOR OP/ED)

## 2024-04-29 PROCEDURE — 99232 SBSQ HOSP IP/OBS MODERATE 35: CPT | Performed by: PSYCHIATRY & NEUROLOGY

## 2024-04-29 PROCEDURE — C9113 INJ PANTOPRAZOLE SODIUM, VIA: HCPCS

## 2024-04-29 PROCEDURE — 83735 ASSAY OF MAGNESIUM: CPT

## 2024-04-29 PROCEDURE — 82947 ASSAY GLUCOSE BLOOD QUANT: CPT | Mod: 91,MUE

## 2024-04-29 PROCEDURE — 2500000001 HC RX 250 WO HCPCS SELF ADMINISTERED DRUGS (ALT 637 FOR MEDICARE OP)

## 2024-04-29 PROCEDURE — 85027 COMPLETE CBC AUTOMATED: CPT

## 2024-04-29 PROCEDURE — 80069 RENAL FUNCTION PANEL: CPT

## 2024-04-29 PROCEDURE — 97165 OT EVAL LOW COMPLEX 30 MIN: CPT | Mod: GO

## 2024-04-29 PROCEDURE — 87506 IADNA-DNA/RNA PROBE TQ 6-11: CPT | Mod: STJLAB | Performed by: STUDENT IN AN ORGANIZED HEALTH CARE EDUCATION/TRAINING PROGRAM

## 2024-04-29 PROCEDURE — 99233 SBSQ HOSP IP/OBS HIGH 50: CPT

## 2024-04-29 PROCEDURE — 87493 C DIFF AMPLIFIED PROBE: CPT | Mod: STJLAB | Performed by: STUDENT IN AN ORGANIZED HEALTH CARE EDUCATION/TRAINING PROGRAM

## 2024-04-29 PROCEDURE — 36415 COLL VENOUS BLD VENIPUNCTURE: CPT

## 2024-04-29 PROCEDURE — 2500000006 HC RX 250 W HCPCS SELF ADMINISTERED DRUGS (ALT 637 FOR ALL PAYERS): Performed by: PSYCHIATRY & NEUROLOGY

## 2024-04-29 PROCEDURE — 97161 PT EVAL LOW COMPLEX 20 MIN: CPT | Mod: GP

## 2024-04-29 PROCEDURE — 1200000002 HC GENERAL ROOM WITH TELEMETRY DAILY

## 2024-04-29 PROCEDURE — 2500000004 HC RX 250 GENERAL PHARMACY W/ HCPCS (ALT 636 FOR OP/ED): Performed by: INTERNAL MEDICINE

## 2024-04-29 RX ORDER — PREDNISONE 20 MG/1
20 TABLET ORAL DAILY
Status: DISCONTINUED | OUTPATIENT
Start: 2024-04-29 | End: 2024-04-30 | Stop reason: HOSPADM

## 2024-04-29 RX ORDER — LANOLIN ALCOHOL/MO/W.PET/CERES
400 CREAM (GRAM) TOPICAL ONCE
Status: COMPLETED | OUTPATIENT
Start: 2024-04-29 | End: 2024-04-29

## 2024-04-29 RX ADMIN — PANTOPRAZOLE SODIUM 40 MG: 40 INJECTION, POWDER, FOR SOLUTION INTRAVENOUS at 09:04

## 2024-04-29 RX ADMIN — HYDROCORTISONE SODIUM SUCCINATE 20 MG: 100 INJECTION, POWDER, FOR SOLUTION INTRAMUSCULAR; INTRAVENOUS at 05:03

## 2024-04-29 RX ADMIN — APIXABAN 2.5 MG: 2.5 TABLET, FILM COATED ORAL at 09:04

## 2024-04-29 RX ADMIN — Medication 400 MG: at 09:09

## 2024-04-29 RX ADMIN — FOLIC ACID 1 MG: 1 TABLET ORAL at 09:04

## 2024-04-29 RX ADMIN — OLANZAPINE 5 MG: 5 TABLET, FILM COATED ORAL at 09:03

## 2024-04-29 RX ADMIN — CEFDINIR 300 MG: 300 CAPSULE ORAL at 09:04

## 2024-04-29 RX ADMIN — LEVETIRACETAM 500 MG: 5 INJECTION INTRAVENOUS at 16:15

## 2024-04-29 RX ADMIN — ATORVASTATIN CALCIUM 40 MG: 40 TABLET, FILM COATED ORAL at 09:04

## 2024-04-29 RX ADMIN — Medication 1 TABLET: at 09:04

## 2024-04-29 RX ADMIN — PREDNISONE 20 MG: 20 TABLET ORAL at 16:16

## 2024-04-29 RX ADMIN — CEFDINIR 300 MG: 300 CAPSULE ORAL at 20:17

## 2024-04-29 RX ADMIN — OLANZAPINE 5 MG: 5 TABLET, FILM COATED ORAL at 20:17

## 2024-04-29 RX ADMIN — Medication 5 MG: at 20:17

## 2024-04-29 RX ADMIN — FAMOTIDINE 20 MG: 10 INJECTION, SOLUTION INTRAVENOUS at 09:04

## 2024-04-29 RX ADMIN — APIXABAN 2.5 MG: 2.5 TABLET, FILM COATED ORAL at 20:17

## 2024-04-29 RX ADMIN — LEVETIRACETAM 500 MG: 5 INJECTION INTRAVENOUS at 05:04

## 2024-04-29 RX ADMIN — THIAMINE HCL TAB 100 MG 100 MG: 100 TAB at 09:04

## 2024-04-29 ASSESSMENT — COGNITIVE AND FUNCTIONAL STATUS - GENERAL
TOILETING: A LOT
WALKING IN HOSPITAL ROOM: A LITTLE
EATING MEALS: A LOT
DRESSING REGULAR LOWER BODY CLOTHING: A LOT
DRESSING REGULAR UPPER BODY CLOTHING: A LITTLE
STANDING UP FROM CHAIR USING ARMS: A LOT
DRESSING REGULAR UPPER BODY CLOTHING: A LOT
WALKING IN HOSPITAL ROOM: A LOT
DRESSING REGULAR LOWER BODY CLOTHING: A LOT
HELP NEEDED FOR BATHING: A LOT
EATING MEALS: A LOT
DAILY ACTIVITIY SCORE: 12
MOVING TO AND FROM BED TO CHAIR: A LOT
PERSONAL GROOMING: A LOT
STANDING UP FROM CHAIR USING ARMS: A LITTLE
HELP NEEDED FOR BATHING: A LOT
DAILY ACTIVITIY SCORE: 18
CLIMB 3 TO 5 STEPS WITH RAILING: TOTAL
TOILETING: A LOT
PERSONAL GROOMING: A LOT
MOVING FROM LYING ON BACK TO SITTING ON SIDE OF FLAT BED WITH BEDRAILS: A LOT
DRESSING REGULAR LOWER BODY CLOTHING: A LITTLE
TURNING FROM BACK TO SIDE WHILE IN FLAT BAD: A LOT
PERSONAL GROOMING: A LITTLE
CLIMB 3 TO 5 STEPS WITH RAILING: A LOT
MOVING TO AND FROM BED TO CHAIR: A LITTLE
MOBILITY SCORE: 11
DAILY ACTIVITIY SCORE: 12
HELP NEEDED FOR BATHING: A LITTLE
MOBILITY SCORE: 15
TOILETING: A LITTLE
TURNING FROM BACK TO SIDE WHILE IN FLAT BAD: A LOT
MOVING FROM LYING ON BACK TO SITTING ON SIDE OF FLAT BED WITH BEDRAILS: A LOT
DRESSING REGULAR UPPER BODY CLOTHING: A LOT
EATING MEALS: A LITTLE

## 2024-04-29 ASSESSMENT — PAIN - FUNCTIONAL ASSESSMENT
PAIN_FUNCTIONAL_ASSESSMENT: 0-10

## 2024-04-29 ASSESSMENT — PAIN SCALES - GENERAL
PAINLEVEL_OUTOF10: 7
PAINLEVEL_OUTOF10: 3
PAINLEVEL_OUTOF10: 7

## 2024-04-29 ASSESSMENT — ACTIVITIES OF DAILY LIVING (ADL)
TOILETING_ASSISTANCE: TOTAL
FEEDING: MODERATE
GROOMING_ASSISTANCE: MODERATE
BATHING_ASSISTANCE: MAXIMAL
ADL_ASSISTANCE: NEEDS ASSISTANCE
BATHING_ASSISTANCE: MAXIMAL

## 2024-04-29 NOTE — PROGRESS NOTES
"    Endocrinology Inpatient Consult Progress Note     PATIENT NAME: Bing Holliday  MRN: [id number]  DATE: 4/29/2024    CONSULTING PHYSICIAN: Dr. Dee  REASON FOR CONSULT: Adrenal Insufficiency. Hypoglyvemia.      Interval Events     Mental status is much improved.  The patient states that she was not eating much at the nursing home.  She does state that she was getting prednisone.  She remembers taking it.  She is on over the last couple of days.  \"It is all kind of fuzzy\"      Physical Examination     /77 (BP Location: Left arm, Patient Position: Lying)   Pulse 86   Temp 36 °C (96.8 °F) (Temporal)   Resp 18   Ht 1.702 m (5' 7\")   Wt 90.9 kg (200 lb 6.4 oz)   LMP  (LMP Unknown)   SpO2 96%   BMI 31.39 kg/m²   Alert and oriented x 3.  Appropriate affect.  Alopecia.  Regular rate and rhythm.  Nonlabored respiration.  Soft nontender nondistended abdomen.  Ulnar deviation of the bilateral fingers.      Medications     Reviewed MAR       Data     Recent Labs and Imaging Reviewed      Assessment / Plan        # Adrenal Insufficiency  Given her clinical stability, I think it is reasonable to put her back on her home dose of prednisone.  Discussed with the internal medicine team about the same.     # Hypoglycemia  Clinical formulation per my initial consult note. In my experience of taking care of this patient on her multiple hospitalizations, she does not become hypoglycemic when she is on glucocorticoids.  This makes me believe that a lot of her hypoglycemia is from hypocortisolism.  Nonetheless, in the past we have tried to work this up by obtaining a BMP glucose as well as a C-peptide when the patient becomes hypoglycemic.  Unfortunately given many factors this was not able to be followed through properly.  I have noticed that the primary team ordered right upper quadrant ultrasound looking for pancreatic mass.  On cross-sectional imaging, the patient does not have any pancreatic lesions.  I reviewed " all of her cross-sectional imaging.  Nonetheless, neuroendocrine tumors of the pancreas can invade CT scans 50% of the time.  If there is a high index of suspicion for insulinoma, the patient would need an EUS.  I would like to biochemically diagnose insulin-dependent hypoglycemia before trying to localize.  Logistically having blood work drawn when the patient is hypoglycemic has been very difficult.     # Uncontrolled Type 2 Diabetes Mellitus  Home Regimen: None.  Hemoglobin A1c (7/23): 6.9%  Nutrtion: Regular Diet.     She tends to get hyperglycemic on high doses of steroids.  We can hold off on sliding scale insulin for now.     # Osteoporosis  In the setting of chronic glucocorticoid use.  This will be further managed as an outpatient.  She probably would benefit from a bisphosphonate, these agents are best studied with steroid-induced adynamic bone disease.         Avi Sloan, DO  Endocrinology, Diabetes, and Metabolism    Available via [employer] Messenger    Please excuse any typographical or unwanted errors within this documentation as voice recognition software was used to dictate this note.

## 2024-04-29 NOTE — PROGRESS NOTES
Bing Holliday is a 63 y.o. female on day 4 of admission presenting with Hypoglycemia.      Subjective   Seen and examined at bedside. No events overnight. Patient denies sleeping well overnight, but continues to remain alert and oriented.      Objective     Last Recorded Vitals  /85 (BP Location: Left arm, Patient Position: Lying)   Pulse 65   Temp 35.7 °C (96.3 °F) (Temporal)   Resp 19   Wt 90.9 kg (200 lb 6.4 oz)   SpO2 91%   Intake/Output last 3 Shifts:    Intake/Output Summary (Last 24 hours) at 4/29/2024 1353  Last data filed at 4/29/2024 0355  Gross per 24 hour   Intake 260 ml   Output 2001 ml   Net -1741 ml       Admission Weight  Weight: 90.9 kg (200 lb 6.4 oz) (04/24/24 0953)    Daily Weight  04/24/24 : 90.9 kg (200 lb 6.4 oz)    Image Results  US right upper quadrant  Narrative: Interpreted By:  Graciela Blevins,   STUDY:  US RIGHT UPPER QUADRANT;  4/27/2024 5:24 pm      INDICATION:  Signs/Symptoms:looking for islet tumor.      COMPARISON:  Correlation with noncontrast CT abdomen pelvis 04/25/2024      ACCESSION NUMBER(S):  [id number]      ORDERING CLINICIAN:  TU ZAMARRIPA      TECHNIQUE:  Grayscale and color Doppler sonographic imaging of the right upper  quadrant.      FINDINGS:  LIVER: Normal size. Normal echogenicity, and echotexture. No focal  abnormalities.      BILE DUCTS: No intrahepatic or extrahepatic bile duct dilatation.  Extrahepatic bile duct = 5 mm.      GALLBLADDER: Status post cholecystectomy.      PANCREAS: Normal head and body. Limited evaluation of the pancreatic  tail due to bowel gas. No pancreatic mass is seen. Pancreatic duct is  within normal limits in size.      RIGHT KIDNEY: Normal size, no hydronephrosis.      PERITONEUM: No upper abdominal ascites.      Impression: Unremarkable right upper quadrant ultrasound.      No visualized pancreatic mass. Please note that ultrasound is limited  in sensitivity for pancreatic masses and further evaluation with  nonemergent  pancreas protocol MRI may be considered if clinically  indicated.      MACRO:  None      Signed by: Graciela Blevins 4/27/2024 6:47 PM  Dictation workstation:   [id number]      Physical Exam  General: Not in acute distress, A&O x 3, alert, well-developed,  HEENT: Normocephalic, atraumatic, EOMI, moist mucous membranes  Neck: Neck supple, trachea midline, no evidence of trauma  Cardiovascular: RRR, S1 and S2 appreciated, no murmurs rubs gallops appreciated, distal pulses 2+ bilaterally   Respiratory: Vesicular breath sounds appreciated bilaterally, no adventitious sounds appreciated, no increased work of breathing on RA  GI: Abdomen soft, nondistended, diffusely tender to palpation including when stethoscope placed on stomach, bowel sounds present  Extremities: 3+ non-pitting edema LLE 2+ pitting edema RLE  MSK: L elbow mildly TTP, scarring noted in antecubital fossa, full ROM  Neuro: A&O X3, no focal deficits, strength and sensation intact bilaterally, moving all extremities, withdrawing to pain which is diffuse and throughout with extremely light palpation.    Skin: Warm and dry, without lesions or rashes    Relevant Results    Results for orders placed or performed during the hospital encounter of 04/24/24 (from the past 24 hour(s))   POCT GLUCOSE   Result Value Ref Range    POCT Glucose 124 (H) 74 - 99 mg/dL   POCT GLUCOSE   Result Value Ref Range    POCT Glucose 165 (H) 74 - 99 mg/dL   POCT GLUCOSE   Result Value Ref Range    POCT Glucose 150 (H) 74 - 99 mg/dL   POCT GLUCOSE   Result Value Ref Range    POCT Glucose 104 (H) 74 - 99 mg/dL   Magnesium   Result Value Ref Range    Magnesium 1.54 (L) 1.60 - 2.40 mg/dL   Renal Function Panel   Result Value Ref Range    Glucose 94 74 - 99 mg/dL    Sodium 148 (H) 136 - 145 mmol/L    Potassium 3.7 3.5 - 5.3 mmol/L    Chloride 117 (H) 98 - 107 mmol/L    Bicarbonate 20 (L) 21 - 32 mmol/L    Anion Gap 15 10 - 20 mmol/L    Urea Nitrogen 20 6 - 23 mg/dL    Creatinine 1.60 (H)  0.50 - 1.05 mg/dL    eGFR 36 (L) >60 mL/min/1.73m*2    Calcium 8.2 (L) 8.6 - 10.3 mg/dL    Phosphorus 2.4 (L) 2.5 - 4.9 mg/dL    Albumin 2.9 (L) 3.4 - 5.0 g/dL   CBC   Result Value Ref Range    WBC 4.6 4.4 - 11.3 x10*3/uL    nRBC 2.8 (H) 0.0 - 0.0 /100 WBCs    RBC 2.92 (L) 4.00 - 5.20 x10*6/uL    Hemoglobin 8.8 (L) 12.0 - 16.0 g/dL    Hematocrit 28.6 (L) 36.0 - 46.0 %    MCV 98 80 - 100 fL    MCH 30.1 26.0 - 34.0 pg    MCHC 30.8 (L) 32.0 - 36.0 g/dL    RDW 18.8 (H) 11.5 - 14.5 %    Platelets 69 (L) 150 - 450 x10*3/uL   POCT GLUCOSE   Result Value Ref Range    POCT Glucose 97 74 - 99 mg/dL   POCT GLUCOSE   Result Value Ref Range    POCT Glucose 124 (H) 74 - 99 mg/dL         CT elbow left wo IV contrast    Result Date: 4/25/2024  Interpreted By:  Graciela Blevins, STUDY: CT ELBOW LEFT WO IV CONTRAST; ;  4/25/2024 8:31 pm   INDICATION: Signs/Symptoms:Self mutilation of antecubital fossa, joint tender to touch, looking for infection.   COMPARISON: None.   ACCESSION NUMBER(S): [id number]   ORDERING CLINICIAN: TU ZAMARRIPA   TECHNIQUE: Noncontrast CT images of the left elbow with axial, sagittal and coronal reconstructed images.   FINDINGS: No acute fracture or malalignment. 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 No erosions or destructive changes. There is a small elbow joint effusion. Otherwise, soft tissues are unremarkable. No soft tissue gas or radiopaque foreign body.       Nonspecific small elbow joint effusion. If there is clinical concern for septic arthritis, joint aspiration is recommended.   Otherwise, soft tissues are unremarkable.   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     MACRO: None   Signed by: Graciela Blevins 4/25/2024 9:54 PM Dictation workstation:   [id number]    CT abdomen pelvis wo IV contrast    Result Date: 4/25/2024  Interpreted By:  Graciela Blevins, STUDY: CT ABDOMEN PELVIS WO IV CONTRAST;  4/25/2024 8:31 pm   INDICATION: Signs/Symptoms:3 pt drop in hgb, AMS, worsening encephalopathy, hx of  secondary adrenal insuficiency and hypoglycemia.   COMPARISON: 03/20/2024   ACCESSION NUMBER(S): [id number]   ORDERING CLINICIAN: TU ZAMARRIPA   TECHNIQUE: Axial noncontrast CT images of the abdomen and pelvis with coronal and sagittal reconstructed images.   FINDINGS: LOWER CHEST: New bilateral lower lobe airspace opacities. Cardiomegaly and coronary artery calcifications noted. BONES: No acute osseous abnormality. Diffuse degenerative disc changes and lumbar facet arthropathy. Right total hip arthroplasty. Severe left hip osteoarthrosis. ABDOMINAL WALL: Within normal limits.   ABDOMEN: Lack of intravenous contrast limits evaluation of vessels and solid organs. LIVER: Within normal limits. BILE DUCTS: No biliary dilatation. GALLBLADDER: Cholecystectomy. PANCREAS: No peripancreatic inflammatory stranding or duct dilatation. SPLEEN: Within normal limits. ADRENALS: Within normal limits.   KIDNEYS, URETERS, URINARY BLADDER: No hydronephrosis. 2 mm nonobstructing right renal calculus. Limited evaluation of the distal ureters due to streak artifact. No hydroureter. Evaluation of the urinary bladder due to streak artifact.   VESSELS: No aortic aneurysm. RETROPERITONEUM: No pathologically enlarged lymph nodes.   PELVIS:   REPRODUCTIVE ORGANS: No abnormality, given limitations of the noncontrast CT.   BOWEL: The stomach is mildly distended. No dilated small bowel. Colonic diverticulosis without acute diverticulitis. Normal appendix. PERITONEUM: No ascites or free air, no fluid collection.       No acute abdominal or pelvic process.   Nonspecific bilateral lower lobe airspace opacities. Please correlate clinically to exclude pneumonia.   MACRO: None   Signed by: Graciela Blevins 4/25/2024 9:51 PM Dictation workstation:   [id number]    Bedside Peripheral IV Imaging    Result Date: 4/25/2024  These images are not reportable by radiology and will not be interpreted by  Radiologists.    Lower extremity venous duplex  bilateral    Result Date: 4/25/2024            [facility] [address] [place] [address]     Tel [phone] Fax [phone]  Vascular Lab Report  [facility] US LOWER EXTREMITY VENOUS DUPLEX BILATERAL Patient Name:      LISBET GUTIERRES      Reading Physician:  [id number] Enzo Ashley MD, RPVI Study Date:        4/25/2024             Ordering Provider:  [id number] HILDA JUAREZ MRN/PID:           [id number]              Fellow: Accession#:        [id number]          Technologist:       Beth Hannah RVT, RDMS Date of Birth/Age: 1960 / 63 years Technologist 2: Gender:            F                     Encounter#:         [id number] Admission Status:  Inpatient             Location Performed: [facility]  Diagnosis/ICD: Other specified soft tissue disorders-M79.89 Indication:    Limb swelling CPT Codes:     76262 Peripheral venous duplex scan for DVT complete  Pertinent History: COPD, Anticoagulation, AAA and LE Edema. Afib.  CONCLUSIONS: Right Lower Venous: No evidence of acute deep vein thrombus visualized in the right lower extremity. Left Lower Venous: No evidence of acute deep vein thrombus visualized in the left lower extremity. Additional Findings: Limited images due to patient's inability to tolerate compressions.  Comparison: Compared with study from 1/15/2024, no significant change.No evidence of DVT.  Imaging & Doppler Findings:  Right                 Compressible Thrombus        Flow Distal External Iliac                None   Spontaneous/Phasic CFV                       Yes        None   Spontaneous/Phasic PFV                       Yes        None FV Proximal               Yes        None   Spontaneous/Phasic FV Mid                     Yes        None FV Distal                 Yes        None Popliteal                 Yes        None   Spontaneous/Phasic Peroneal                  Yes        None PTV                       Yes        None  Left                  Compress Thrombus        Flow Distal External Iliac            None   Spontaneous/Phasic CFV                     Yes      None   Spontaneous/Phasic PFV                     Yes      None FV Proximal             Yes      None   Spontaneous/Phasic FV Mid                  Yes      None FV Distal               Yes      None Popliteal               Yes      None   Spontaneous/Phasic Peroneal                Yes      None PTV                     Yes      None  [id number] Enzo Ashley MD, ROLANDO Electronically signed by [id number] Enzo Ashley MD, ROLANDO on 4/25/2024 at 1:07:18 PM  ** Final **     XR chest 1 view    Result Date: 4/24/2024  Interpreted By:  Mitesh Khan, STUDY: XR CHEST 1 VIEW;  4/24/2024 5:41 pm   INDICATION: Signs/Symptoms:cough.   COMPARISON: 03/19/2024   ACCESSION NUMBER(S): [id number]   ORDERING CLINICIAN: ENEIDA GRIER   FINDINGS:         CARDIOMEDIASTINAL SILHOUETTE: Cardiomediastinal silhouette is enlarged.   LUNGS: There is interval appearance of a right upper lobe patchy airspace disease concerning for pneumonia. The left lung is clear. There is no effusion   ABDOMEN: No remarkable upper abdominal findings.   BONES: No acute osseous changes.       1.  New right upper lobe airspace disease compatible with pneumonia in the proper clinical setting. Radiographic follow-up to resolution in 3-4 weeks is advised.       MACRO: None   Signed by: Mitesh Khan 4/24/2024 5:50 PM Dictation workstation:   [id number]      Scheduled medications  apixaban, 2.5 mg, oral, BID  atorvastatin, 40 mg, oral, Daily  cefdinir, 300 mg, oral, BID  famotidine, 20 mg, intravenous, q24h GERALD  folic acid, 1 mg, oral, Daily  levETIRAcetam, 500 mg, intravenous, q12h  lidocaine, 5 mL, infiltration,  Once  melatonin, 5 mg, oral, Nightly  multivitamin with minerals, 1 tablet, oral, Daily  [Held by provider] mycophenolate, 1,000 mg, oral, BID  NIFEdipine ER, 60 mg, oral, Daily before breakfast  OLANZapine, 5 mg, oral, BID  pantoprazole, 40 mg, intravenous, Daily  predniSONE, 20 mg, oral, Daily  thiamine, 100 mg, oral, Daily      Continuous medications       PRN medications  PRN medications: dextrose, dextrose, glucagon, glucagon, HYDROmorphone, ipratropium-albuteroL   Assessment/Plan        Principal Problem:    Hypoglycemia        Malnutrition Diagnosis Status: New  Malnutrition Diagnosis: Moderate malnutrition related to acute disease or injury  As Evidenced by: significant weight loss of 5.6kg(5.8%) since last admit 30 days ago and refusing intakes for the last 3-4 days.  I agree with the dietitian's malnutrition diagnosis.    1.  Hypoglycemia in the setting of IDDM2, 2/2 chronic steroid use: Patient has a known history of hypoglycemia with multitudinous hospital presentations  Secondary AI  AI exacerbation suspect 2/2 to CAP  Patient presents to [facility] ED for chief complaint of hypoglycemia this is in the setting of decreased oral intake and still receiving her insulin at nursing facility.  -Continue hypoglycemia protocol  - Hold home insulin  - Resume home prednisone per endocrine, appreciate recoomendations  - Encourage p.o. intake  - Q4 accuchecks   - Strep Pneumo Urine Ag positive, continue treatment for CAP       2. COPD not in exacerbation with cough  Likely Community Acquired Pneumonia  UTI?  Vague abdominal pain  - Strep Pneumo Ag positive  - DuoNebs every 6 hours prn  - CXR obtained showed new RUL opacity  - Urinalysis consistent with UTI, and patient is not a reliable historian    - COVID/FLU/RSV PCR negative  - BCNTD  Plan:  - Continue oral Cefdinir 300mg BID for 2 days to complete a 7 day course of abx    3. Lethargy likely 2/2 Antipsychotic use vs metabolic encephalopathy d/t infection - improved    Suicidality vs encephalopathy   L antecubital fossa injury-self inflicted  - CT L elbow showed small effusion, CRP & ESR elevated from baseline, ortho consulted and low concern for septic joint at this time  Plan:  - 1:1 sitter  - Psychiatry consulted appreciate recs  Continue Zyprexa 5mg BID, dc Risperdal  - EKG for QT monitoring   -Treat underlying infection      4. Acute on Chronic Anemia  Macrocytic anemia  Abdominal pain   - HgB downtrend to 6.4 on admission, no overt signs of bleeding  - Consented and transfused 1 unit pRBCs, HgB incremented appropriately from 6.4 -> 7.3  -  CT abd/pelvis negative for acute pathology   - Anemia labs showed high ferritin, low reticulocyte index, macrocytosis, suspect anemia etiology is multifactorial   -04/27 Hgb 6.6 --> 8.4 following 1 uRBCs  Plan:  -Stable Hgb  -Continue to monitor CBC     5. 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 Atrial fibrillation on Eliquis  -Continue home Eliquis 2.5 mg twice daily  -Electrolytes optimized     7.HFrEF with Lower extremity swelling, (chronic)  TTE 3/10/2024: LVEF 40 to 45% and low normal RV systolic function.  Mild/moderate MR.  Moderately elevated RVSP of 49.5, global hypokinesis of left ventricle with minor regional variations   -Continue home atorvastatin 40 mg     8. Seizure Hx:  - Continue home Keppra 500 mg twice daily       9.  GERD:  -Continue home pantoprazole 40 mg     10. Hx transient AMS with hallucinations:  -Discontinue home Risperdal per Psych     IVF: none  Diet: Diabetic  DVT prophylaxis: Continue home Eliquis  Dispo: worsening AI due to acute infection, anticipate 1-2 more [facility] stays, can return to LTC at [facility] when medically appropriate  Consults: None  CODE STATUS: Full code      Case seen and discussed with attending  Steve Martinez DO  PGY-2  Internal Medicine

## 2024-04-29 NOTE — PROGRESS NOTES
Pt is LTC bedhold at [facility] and can return when ready.  Sent update that ADOD is 1-2 more days.  Psych has adjusted medications; she is now cleared from a psych standpoint.  Updates sent to [facility] through [facility].

## 2024-04-29 NOTE — PROGRESS NOTES
04/29/24 1114   Discharge Planning   Assistance Needed yes   Type of Residence Nursing home/residential care   Patient expects to be discharged to: [facility]   Does the patient need discharge transport arranged? Yes   RoundTrip coordination needed? Yes   Has discharge transport been arranged? No   What day is the transport expected? 04/30/24     Confirmed with son, López at, [phone] that the plan is for his mom to return to [facility] when medically ready for discharge. They are assisting patient with the medicaid application. Will continue to follow for discharge needs.

## 2024-04-29 NOTE — CARE PLAN
Problem: Nutrition  Goal: Less than 5 days NPO/clear liquids  Outcome: Progressing  Goal: Oral intake greater than 50%  Outcome: Progressing  Goal: Oral intake greater 75%  Outcome: Progressing  Goal: Consume prescribed supplement  Outcome: Progressing  Goal: Adequate PO fluid intake  Outcome: Progressing  Goal: Nutrition support goals are met within 48 hrs  Outcome: Progressing  Goal: Nutrition support is meeting 75% of nutrient needs  Outcome: Progressing  Goal: Tube feed tolerance  Outcome: Progressing  Goal: BG  mg/dL  Outcome: Progressing  Goal: Lab values WNL  Outcome: Progressing  Goal: Electrolytes WNL  Outcome: Progressing  Goal: Promote healing  Outcome: Progressing  Goal: Maintain stable weight  Outcome: Progressing  Goal: Reduce weight from edema/fluid  Outcome: Progressing  Goal: Gradual weight gain  Outcome: Progressing  Goal: Improve ostomy output  Outcome: Progressing     Problem: Skin  Goal: Decreased wound size/increased tissue granulation at next dressing change  Outcome: Progressing  Flowsheets (Taken 4/29/2024 1527)  Decreased wound size/increased tissue granulation at next dressing change:   Promote sleep for wound healing   Protective dressings over bony prominences  Goal: Participates in plan/prevention/treatment measures  Outcome: Progressing  Flowsheets (Taken 4/29/2024 1527)  Participates in plan/prevention/treatment measures:   Discuss with provider PT/OT consult   Elevate heels  Goal: Prevent/manage excess moisture  Outcome: Progressing  Flowsheets (Taken 4/29/2024 1527)  Prevent/manage excess moisture:   Moisturize dry skin   Monitor for/manage infection if present  Goal: Prevent/minimize sheer/friction injuries  Outcome: Progressing  Flowsheets (Taken 4/29/2024 1527)  Prevent/minimize sheer/friction injuries:   Increase activity/out of bed for meals   Turn/reposition every 2 hours/use positioning/transfer devices  Goal: Promote/optimize nutrition  Outcome:  Progressing  Flowsheets (Taken 4/29/2024 1527)  Promote/optimize nutrition:   Assist with feeding   Offer water/supplements/favorite foods  Goal: Promote skin healing  Outcome: Progressing  Flowsheets (Taken 4/29/2024 1527)  Promote skin healing: Turn/reposition every 2 hours/use positioning/transfer devices     Problem: Pain  Goal: Takes deep breaths with improved pain control throughout the shift  Outcome: Progressing  Goal: Turns in bed with improved pain control throughout the shift  Outcome: Progressing  Goal: Walks with improved pain control throughout the shift  Outcome: Progressing  Goal: Performs ADL's with improved pain control throughout shift  Outcome: Progressing  Goal: Participates in PT with improved pain control throughout the shift  Outcome: Progressing  Goal: Free from opioid side effects throughout the shift  Outcome: Progressing  Goal: Free from acute confusion related to pain meds throughout the shift  Outcome: Progressing   The patient's goals for the shift include      The clinical goals for the shift include completed

## 2024-04-29 NOTE — PROGRESS NOTES
"Bing Holliday is a 63 y.o. female on day 4 of admission presenting with Hypoglycemia.    SUBJECTIVE:    Pt had a better day yesterday but today she is again hallucinating visually.  She is alert and oriented x 2.  She has been cooperative with all the treatment.  Patient remained without sitter.  Patient is on steroid which could contribute to her ongoing delirium/hallucination as well as Keppra which could contribute to psychiatric manifestation    Patient is on Zyprexa 5 mg twice daily and is tolerating without any side effects      Exam:  Vital Signs: /85 (BP Location: Left arm, Patient Position: Lying)   Pulse 65   Temp 35.7 °C (96.3 °F) (Temporal)   Resp 19   Ht 1.702 m (5' 7\")   Wt 90.9 kg (200 lb 6.4 oz)   LMP  (LMP Unknown)   SpO2 91%   BMI 31.39 kg/m²   Musculoskeletal:  normal  Gait/Station: in bed      Mental Status Exam:  VH++  Cognition:  Insight: poor  Judgment: poor     Results for orders placed or performed during the hospital encounter of 04/24/24 (from the past 96 hour(s))   POCT GLUCOSE   Result Value Ref Range    POCT Glucose 215 (H) 74 - 99 mg/dL   POCT GLUCOSE   Result Value Ref Range    POCT Glucose 115 (H) 74 - 99 mg/dL   POCT GLUCOSE   Result Value Ref Range    POCT Glucose 85 74 - 99 mg/dL   Renal Function Panel   Result Value Ref Range    Glucose 76 74 - 99 mg/dL    Sodium 146 (H) 136 - 145 mmol/L    Potassium 5.0 3.5 - 5.3 mmol/L    Chloride 118 (H) 98 - 107 mmol/L    Bicarbonate 21 21 - 32 mmol/L    Anion Gap 12 10 - 20 mmol/L    Urea Nitrogen 29 (H) 6 - 23 mg/dL    Creatinine 1.76 (H) 0.50 - 1.05 mg/dL    eGFR 32 (L) >60 mL/min/1.73m*2    Calcium 8.4 (L) 8.6 - 10.3 mg/dL    Phosphorus 3.3 2.5 - 4.9 mg/dL    Albumin 2.7 (L) 3.4 - 5.0 g/dL   Magnesium   Result Value Ref Range    Magnesium 2.15 1.60 - 2.40 mg/dL   CBC and Auto Differential   Result Value Ref Range    WBC 3.1 (L) 4.4 - 11.3 x10*3/uL    nRBC 0.6 (H) 0.0 - 0.0 /100 WBCs    RBC 2.46 (L) 4.00 - 5.20 x10*6/uL    " Hemoglobin 7.3 (L) 12.0 - 16.0 g/dL    Hematocrit 23.7 (L) 36.0 - 46.0 %    MCV 96 80 - 100 fL    MCH 29.7 26.0 - 34.0 pg    MCHC 30.8 (L) 32.0 - 36.0 g/dL    RDW 20.1 (H) 11.5 - 14.5 %    Platelets 62 (L) 150 - 450 x10*3/uL    Neutrophils % 62.4 40.0 - 80.0 %    Immature Granulocytes %, Automated 1.3 (H) 0.0 - 0.9 %    Lymphocytes % 28.0 13.0 - 44.0 %    Monocytes % 8.0 2.0 - 10.0 %    Eosinophils % 0.3 0.0 - 6.0 %    Basophils % 0.0 0.0 - 2.0 %    Neutrophils Absolute 1.94 1.20 - 7.70 x10*3/uL    Immature Granulocytes Absolute, Automated 0.04 0.00 - 0.70 x10*3/uL    Lymphocytes Absolute 0.87 (L) 1.20 - 4.80 x10*3/uL    Monocytes Absolute 0.25 0.10 - 1.00 x10*3/uL    Eosinophils Absolute 0.01 0.00 - 0.70 x10*3/uL    Basophils Absolute 0.00 0.00 - 0.10 x10*3/uL   C-reactive protein   Result Value Ref Range    C-Reactive Protein 7.29 (H) <1.00 mg/dL   Sedimentation Rate   Result Value Ref Range    Sedimentation Rate 41 (H) 0 - 30 mm/h   Morphology   Result Value Ref Range    RBC Morphology See Below     Ovalocytes Few     [place] Cells Few    POCT GLUCOSE   Result Value Ref Range    POCT Glucose 82 74 - 99 mg/dL   POCT GLUCOSE   Result Value Ref Range    POCT Glucose 86 74 - 99 mg/dL   RSV PCR   Result Value Ref Range    RSV PCR Not Detected Not Detected   Sars-CoV-2 and Influenza A/B PCR   Result Value Ref Range    Flu A Result Not Detected Not Detected    Flu B Result Not Detected Not Detected    Coronavirus 2019, PCR Not Detected Not Detected   ECG 12 lead   Result Value Ref Range    Ventricular Rate 90 BPM    Atrial Rate 90 BPM    WI Interval 156 ms    QRS Duration 92 ms    QT Interval 332 ms    QTC Calculation(Bazett) 406 ms    P Axis 55 degrees    R Axis -11 degrees    T Axis 150 degrees    QRS Count 15 beats    Q Onset 215 ms    P Onset 137 ms    P Offset 197 ms    T Offset 381 ms    QTC Fredericia 380 ms   Legionella Antigen, Urine    Specimen: Urine   Result Value Ref Range    L. pneumophila Urine Ag Negative  Negative   Streptococcus pneumoniae Antigen, Urine    Specimen: Urine   Result Value Ref Range    Streptococcus pneumoniae Ag, Urine Positive (A) Negative   Urine culture    Specimen: Clean Catch/Voided; Urine   Result Value Ref Range    Urine Culture 20,000 - 80,000 Enterococcus faecium (A)        Susceptibility    Enterococcus faecium - MICROSCAN     Ampicillin  Resistant      Ciprofloxacin  Resistant      Daptomycin  Susceptible-dose dependent      Levofloxacin  Resistant      Linezolid  Susceptible      Nitrofurantoin  Susceptible      Penicillin  Resistant      Trimethoprim/Sulfamethoxazole  Resistant      Vancomycin  Resistant    POCT GLUCOSE   Result Value Ref Range    POCT Glucose 72 (L) 74 - 99 mg/dL   POCT GLUCOSE   Result Value Ref Range    POCT Glucose 87 74 - 99 mg/dL   POCT GLUCOSE   Result Value Ref Range    POCT Glucose 97 74 - 99 mg/dL   POCT GLUCOSE   Result Value Ref Range    POCT Glucose 106 (H) 74 - 99 mg/dL   POCT GLUCOSE   Result Value Ref Range    POCT Glucose 88 74 - 99 mg/dL   POCT GLUCOSE   Result Value Ref Range    POCT Glucose 113 (H) 74 - 99 mg/dL   POCT GLUCOSE   Result Value Ref Range    POCT Glucose 128 (H) 74 - 99 mg/dL   CBC and Auto Differential   Result Value Ref Range    WBC 3.4 (L) 4.4 - 11.3 x10*3/uL    nRBC 0.6 (H) 0.0 - 0.0 /100 WBCs    RBC 2.19 (L) 4.00 - 5.20 x10*6/uL    Hemoglobin 6.6 (L) 12.0 - 16.0 g/dL    Hematocrit 22.0 (L) 36.0 - 46.0 %     (H) 80 - 100 fL    MCH 30.1 26.0 - 34.0 pg    MCHC 30.0 (L) 32.0 - 36.0 g/dL    RDW 20.4 (H) 11.5 - 14.5 %    Platelets 55 (L) 150 - 450 x10*3/uL    Neutrophils % 71.5 40.0 - 80.0 %    Immature Granulocytes %, Automated 3.6 (H) 0.0 - 0.9 %    Lymphocytes % 15.4 13.0 - 44.0 %    Monocytes % 8.6 2.0 - 10.0 %    Eosinophils % 0.6 0.0 - 6.0 %    Basophils % 0.3 0.0 - 2.0 %    Neutrophils Absolute 2.41 1.20 - 7.70 x10*3/uL    Immature Granulocytes Absolute, Automated 0.12 0.00 - 0.70 x10*3/uL    Lymphocytes Absolute 0.52 (L)  1.20 - 4.80 x10*3/uL    Monocytes Absolute 0.29 0.10 - 1.00 x10*3/uL    Eosinophils Absolute 0.02 0.00 - 0.70 x10*3/uL    Basophils Absolute 0.01 0.00 - 0.10 x10*3/uL   Magnesium   Result Value Ref Range    Magnesium 1.67 1.60 - 2.40 mg/dL   Comprehensive metabolic panel   Result Value Ref Range    Glucose 133 (H) 74 - 99 mg/dL    Sodium 144 136 - 145 mmol/L    Potassium 4.0 3.5 - 5.3 mmol/L    Chloride 118 (H) 98 - 107 mmol/L    Bicarbonate 20 (L) 21 - 32 mmol/L    Anion Gap 10 10 - 20 mmol/L    Urea Nitrogen 22 6 - 23 mg/dL    Creatinine 1.77 (H) 0.50 - 1.05 mg/dL    eGFR 32 (L) >60 mL/min/1.73m*2    Calcium 7.7 (L) 8.6 - 10.3 mg/dL    Albumin 2.5 (L) 3.4 - 5.0 g/dL    Alkaline Phosphatase 93 33 - 136 U/L    Total Protein 4.6 (L) 6.4 - 8.2 g/dL    AST 19 9 - 39 U/L    Bilirubin, Total 0.3 0.0 - 1.2 mg/dL    ALT 18 7 - 45 U/L   Morphology   Result Value Ref Range    RBC Morphology See Below     Polychromasia Mild     Ovalocytes Few     [place] Cells Few     Acanthocytes Few    Prepare RBC: 1 Units, Irradiated   Result Value Ref Range    PRODUCT CODE N6688S26     Unit Number [id number]     Unit ABO O     Unit RH POS     XM INTEP COMP     Dispense Status TR     Blood Expiration Date May 03, 2024 23:59 EDT     PRODUCT BLOOD TYPE 5100     UNIT VOLUME 350    POCT GLUCOSE   Result Value Ref Range    POCT Glucose 76 74 - 99 mg/dL   CBC   Result Value Ref Range    WBC 2.5 (L) 4.4 - 11.3 x10*3/uL    nRBC 1.2 (H) 0.0 - 0.0 /100 WBCs    RBC 2.83 (L) 4.00 - 5.20 x10*6/uL    Hemoglobin 8.4 (L) 12.0 - 16.0 g/dL    Hematocrit 27.6 (L) 36.0 - 46.0 %    MCV 98 80 - 100 fL    MCH 29.7 26.0 - 34.0 pg    MCHC 30.4 (L) 32.0 - 36.0 g/dL    RDW 19.0 (H) 11.5 - 14.5 %    Platelets 51 (L) 150 - 450 x10*3/uL   POCT GLUCOSE   Result Value Ref Range    POCT Glucose 109 (H) 74 - 99 mg/dL   POCT GLUCOSE   Result Value Ref Range    POCT Glucose 87 74 - 99 mg/dL   Magnesium   Result Value Ref Range    Magnesium 1.36 (L) 1.60 - 2.40 mg/dL    Renal Function Panel   Result Value Ref Range    Glucose 99 74 - 99 mg/dL    Sodium 145 136 - 145 mmol/L    Potassium 3.6 3.5 - 5.3 mmol/L    Chloride 117 (H) 98 - 107 mmol/L    Bicarbonate 21 21 - 32 mmol/L    Anion Gap 11 10 - 20 mmol/L    Urea Nitrogen 23 6 - 23 mg/dL    Creatinine 1.70 (H) 0.50 - 1.05 mg/dL    eGFR 34 (L) >60 mL/min/1.73m*2    Calcium 7.7 (L) 8.6 - 10.3 mg/dL    Phosphorus 3.3 2.5 - 4.9 mg/dL    Albumin 2.4 (L) 3.4 - 5.0 g/dL   CBC   Result Value Ref Range    WBC 3.7 (L) 4.4 - 11.3 x10*3/uL    nRBC 0.0 0.0 - 0.0 /100 WBCs    RBC 2.50 (L) 4.00 - 5.20 x10*6/uL    Hemoglobin 7.4 (L) 12.0 - 16.0 g/dL    Hematocrit 24.1 (L) 36.0 - 46.0 %    MCV 96 80 - 100 fL    MCH 29.6 26.0 - 34.0 pg    MCHC 30.7 (L) 32.0 - 36.0 g/dL    RDW 19.1 (H) 11.5 - 14.5 %    Platelets 63 (L) 150 - 450 x10*3/uL   POCT GLUCOSE   Result Value Ref Range    POCT Glucose 122 (H) 74 - 99 mg/dL   POCT GLUCOSE   Result Value Ref Range    POCT Glucose 202 (H) 74 - 99 mg/dL   POCT GLUCOSE   Result Value Ref Range    POCT Glucose 124 (H) 74 - 99 mg/dL   POCT GLUCOSE   Result Value Ref Range    POCT Glucose 165 (H) 74 - 99 mg/dL   POCT GLUCOSE   Result Value Ref Range    POCT Glucose 150 (H) 74 - 99 mg/dL   POCT GLUCOSE   Result Value Ref Range    POCT Glucose 104 (H) 74 - 99 mg/dL   Magnesium   Result Value Ref Range    Magnesium 1.54 (L) 1.60 - 2.40 mg/dL   Renal Function Panel   Result Value Ref Range    Glucose 94 74 - 99 mg/dL    Sodium 148 (H) 136 - 145 mmol/L    Potassium 3.7 3.5 - 5.3 mmol/L    Chloride 117 (H) 98 - 107 mmol/L    Bicarbonate 20 (L) 21 - 32 mmol/L    Anion Gap 15 10 - 20 mmol/L    Urea Nitrogen 20 6 - 23 mg/dL    Creatinine 1.60 (H) 0.50 - 1.05 mg/dL    eGFR 36 (L) >60 mL/min/1.73m*2    Calcium 8.2 (L) 8.6 - 10.3 mg/dL    Phosphorus 2.4 (L) 2.5 - 4.9 mg/dL    Albumin 2.9 (L) 3.4 - 5.0 g/dL   CBC   Result Value Ref Range    WBC 4.6 4.4 - 11.3 x10*3/uL    nRBC 2.8 (H) 0.0 - 0.0 /100 WBCs    RBC 2.92 (L) 4.00 -  5.20 x10*6/uL    Hemoglobin 8.8 (L) 12.0 - 16.0 g/dL    Hematocrit 28.6 (L) 36.0 - 46.0 %    MCV 98 80 - 100 fL    MCH 30.1 26.0 - 34.0 pg    MCHC 30.8 (L) 32.0 - 36.0 g/dL    RDW 18.8 (H) 11.5 - 14.5 %    Platelets 69 (L) 150 - 450 x10*3/uL   POCT GLUCOSE   Result Value Ref Range    POCT Glucose 97 74 - 99 mg/dL   POCT GLUCOSE   Result Value Ref Range    POCT Glucose 124 (H) 74 - 99 mg/dL           Impression:   Delirium- resolving    Recommendations:    1. zyprexa to 5 mg po bid-to be continued  2. Can be discharged from psych standpoint when medically stable    Thank you for allowing us to participate in the care of this patient.       Yaniv Lee MD  4/29/2024  4:20 PM

## 2024-04-29 NOTE — PROGRESS NOTES
Physical Therapy    Physical Therapy Evaluation    Patient Name: Bing Holliday  MRN: [id number]  Today's Date: 4/29/2024   Time Calculation  Start Time: 1022  Stop Time: 1041  Time Calculation (min): 19 min    Assessment/Plan   PT Assessment  PT Assessment Results: Decreased strength, Decreased range of motion, Decreased endurance, Impaired balance, Decreased mobility, Decreased safety awareness, Impaired judgement, Decreased cognition  Rehab Prognosis: Good  Evaluation/Treatment Tolerance: Patient limited by fatigue  Medical Staff Made Aware: Yes  Barriers to Participation: Insight into problems  End of Session Communication: Bedside nurse  Assessment Comment: Pt presents today below baseline level of function and requires continued PT during hospital stay. Pt requires PT at a moderate intensity level at discharge to maximize functional mobility and safety.    End of Session Patient Position: Up in chair, Alarm on  IP OR SWING BED PT PLAN  Inpatient or Swing Bed: Inpatient  PT Plan  Treatment/Interventions: Bed mobility, Transfer training, Gait training, Balance training, Neuromuscular re-education, Strengthening, Endurance training, Range of motion, Therapeutic exercise, Therapeutic activity, Home exercise program  PT Plan: Skilled PT  PT Frequency: 3 times per week  PT Discharge Recommendations: Moderate intensity level of continued care  Equipment Recommended upon Discharge: Wheeled walker  PT Recommended Transfer Status: Assist x2 (FWW, gait belt)  PT - OK to Discharge: Yes (To next level of care when cleared by medical team   )    Subjective       General Visit Information:  General  Reason for Referral: impaired mobility  Referred By: Aishwarya Dee MD  Past Medical History Relevant to Rehab: Admitted for hypoglycemia. PMH: SLE, Jaccoud arhtopathy, adrenal insuffiency, CKD3, COPD, Afib on Eliquis, CAD s/p CABG 2013, AAA  Family/Caregiver Present: No  Co-Treatment: OT  Co-Treatment Reason: for  safety  Prior to Session Communication: Bedside nurse  Patient Position Received: Alarm on, Bed, 4 rail up (seizure pads in place)  Preferred Learning Style: verbal  General Comment: Pt agreeable to PT, nursing cleared for treatment.    Home Living:  Home Living  Home Living Comments: Pt admitted from nursing facility. Pt states she was ambulating with therapy at facility    Prior Level of Function:       Precautions:  Precautions  UE Weight Bearing Status: Weight Bearing as Tolerated (left UE)  Medical Precautions: Fall precautions, Seizure precautions  Precautions Comment: small effusion left elbow    Vital Signs:     Objective     Pain:  Pain Assessment  Pain Assessment: 0-10  Pain Score: 7  Pain Type: Chronic pain  Pain Location: Ankle  Pain Orientation: Left  Pain Interventions: Ambulation/increased activity, Repositioned  Response to Interventions: pt reports pain increases with ambulation and standing    Cognition:  Cognition  Overall Cognitive Status: Impaired (pt appears to be hallucinating; pt states she is in a meeting with 50 other people)  Safety/Judgement:  (impaired)  Insight: Severe    General Assessments:      Activity Tolerance  Endurance: Decreased tolerance for upright activites                 Static Sitting Balance  Static Sitting-Comment/Number of Minutes: good  Dynamic Sitting Balance  Dynamic Sitting-Comments: good  Static Standing Balance  Static Standing-Comment/Number of Minutes: fair  Dynamic Standing Balance  Dynamic Standing-Comments: fair    Functional Assessments:     Bed Mobility  Bed Mobility: Yes  Bed Mobility 1  Bed Mobility 1: Supine to sitting  Level of Assistance 1: Moderate assistance  Bed Mobility Comments 1: to scoot hips to EOB  Transfers  Transfer: Yes  Transfer 1  Transfer From 1: Sit to  Transfer to 1: Stand  Transfer Device 1: Walker  Transfer Level of Assistance 1: Contact guard  Transfers 2  Transfer From 2: Stand to  Transfer to 2: Sit  Transfer Device 2:  Walker  Transfer Level of Assistance 2: Contact guard  Ambulation/Gait Training  Ambulation/Gait Training Performed: Yes  Ambulation/Gait Training 1  Device 1: Rolling walker  Gait Support Devices: Gait belt  Assistance 1: Contact guard  Quality of Gait 1: Inconsistent stride length, Decreased step length, Diminished heel strike, Soft knee(s) (pt unsteady)  Comments/Distance (ft) 1: 4 feet bed to chair          Extremity/Trunk Assessments:        RLE   RLE : Exceptions to WFL  AROM RLE (degrees)  RLE AROM Comment: 50-75% throughout  Strength RLE  RLE Overall Strength:  (grossly 3-/5 throughout)  LLE   LLE : Exceptions to WFL  AROM LLE (degrees)  LLE AROM Comment: 50-75% throughout  Strength LLE  LLE Overall Strength:  (grossly 3-/5 throughout)    Outcome Measures:  [facility] Basic Mobility  Turning from your back to your side while in a flat bed without using bedrails: A lot  Moving from lying on your back to sitting on the side of a flat bed without using bedrails: A lot  Moving to and from bed to chair (including a wheelchair): A little  Standing up from a chair using your arms (e.g. wheelchair or bedside chair): A little  To walk in hospital room: A little  Climbing 3-5 steps with railing: A lot  Basic Mobility - Total Score: 15                            Goals:  Encounter Problems       Encounter Problems (Active)       PT Problem       Pt will perform bed mobility with min A.        Start:  04/29/24    Expected End:  05/13/24            Pt will complete sit <> stand and bed <> chair transfers with SBA.        Start:  04/29/24    Expected End:  05/13/24            Pt will ambulate 50 feet CGA using FWW with no significant gait deviations.         Start:  04/29/24    Expected End:  05/13/24            Pt will progress to completing 3 x 20 supine/seated exercises in order to increase strength and improve gait mechanics.         Start:  04/29/24    Expected End:  05/13/24                 Education  Documentation  Precautions, taught by Brittani Dale, PT at 4/29/2024 12:13 PM.  Learner: Patient  Readiness: Acceptance  Method: Explanation  Response: Verbalizes Understanding, Needs Reinforcement    Body Mechanics, taught by Brittani Dale, PT at 4/29/2024 12:13 PM.  Learner: Patient  Readiness: Acceptance  Method: Explanation  Response: Verbalizes Understanding, Needs Reinforcement    Mobility Training, taught by Brittani Dale PT at 4/29/2024 12:13 PM.  Learner: Patient  Readiness: Acceptance  Method: Explanation  Response: Verbalizes Understanding, Needs Reinforcement    Education Comments  No comments found.

## 2024-04-29 NOTE — CARE PLAN
Problem: Nutrition  Goal: Less than 5 days NPO/clear liquids  Outcome: Progressing  Goal: Oral intake greater than 50%  Outcome: Progressing  Goal: Oral intake greater 75%  Outcome: Progressing  Goal: Consume prescribed supplement  Outcome: Progressing  Goal: Adequate PO fluid intake  Outcome: Progressing  Goal: Nutrition support goals are met within 48 hrs  Outcome: Progressing  Goal: Nutrition support is meeting 75% of nutrient needs  Outcome: Progressing  Goal: Tube feed tolerance  Outcome: Progressing  Goal: BG  mg/dL  Outcome: Progressing  Goal: Lab values WNL  Outcome: Progressing  Goal: Electrolytes WNL  Outcome: Progressing  Goal: Promote healing  Outcome: Progressing  Goal: Maintain stable weight  Outcome: Progressing  Goal: Reduce weight from edema/fluid  Outcome: Progressing  Goal: Gradual weight gain  Outcome: Progressing  Goal: Improve ostomy output  Outcome: Progressing   The patient's goals for the shift include      The clinical goals for the shift include remain afebrile    Over the shift, the patient did not make progress toward the following goals.

## 2024-04-29 NOTE — DOCUMENTATION CLARIFICATION NOTE
"    PATIENT:               LISBET GUTIERRES  ACCT #:                  [id number]  MRN:                       [id number]  :                       1960  ADMIT DATE:       2024 9:25 AM  DISCH DATE:  RESPONDING PROVIDER #:        [id number]          PROVIDER RESPONSE TEXT:    Multifactorial Encephalopathy, Metabolic Encephalopathy 2/2 Pneumonia and  Toxic Encephalopathy 2/2 antipsychotic medication    CDI QUERY TEXT:    Clarification    Instruction:    Based on your assessment of the patient and the clinical information, please provide the requested documentation by clicking on the appropriate radio button and enter any additional information if prompted.    Question: Please further clarify the type of Encephalopathy as    When answering this query, please exercise your independent professional judgment. The fact that a question is being asked, does not imply that any particular answer is desired or expected.    The patient's clinical indicators include:  Clinical Information: 62 y/o F admitted with hypoglycemia and AMS    Clinical Indicators:    ED note  DR. Singleton: \"brought in by EMS for hypoglycemia.  On arrival to ED noted to be hypoglycemic was given D50, with some improvement of the sugar, her mental status improved mildly, but at this time she is not following commands.   Mental Status: She is disoriented. Given Solu-Cortef due to concerns for adrenal insufficiency\"    H/P  Dr. Aranda/George:  \"transient AMS with hallucinations-continue home risperdal.  When attempts made to question the patient about recent history patient repeatedly stated \"I do not know\" and refused to participate in any questioning\"    MD PN  Dr. Figueroa/George: \"Suicidality vs encephalopathy-L antecubital fossa injury-self inflicted, 1:1 sitter, Psychiatry consulted.  Hx transient AMS with hallucinations\"    psychiatry consult  Dr. Lee: \"Delirium, Steroid induced psychosis.  Pt during the assessment is alert and " "oriented x 2\"    MD PN 4/26 Dr. Dee: \"Suicidality vs encephalopathy.  Mentation improving.  She reports today her confusion is better.  Sitter at bedside reports that she was hallucinating all morning. possible PNA, UTI?\"    psychiatry PN 4/26 Dr. Lee: \"Pt remain psychotic with visual hallucination and poor sleep last.  Still disoriented with labile affect\"    MD PN 4/27 Dr. Martinez: \"Lethargy likely 2/2 Antipsychotic use vs metabolic encephalopathy d/t infection.  Strep pneumo antigen returned positive, patient is on ceftriaxone. Concern for possible oversedation due to the increase in Zyprexa dosing yesterday to twice daily from daily dosing.  Patient has not had any of her morning medications due to lethargy.\"    MD PN 4/28 Dr. Martinez: \"Lethargy likely 2/2 Antipsychotic use vs metabolic encephalopathy d/t infection.  treat underlying infection\"    Treatment: daily labs, psychiatry consult, PO zyprexa BID, PO ridperdal at night, IV cefepime    Risk Factors: delirium, AMS with hallucinations, encephalopathy, PNA  Options provided:  -- Metabolic Encephalopathy 2/2 Pneumonia  -- Toxic Encephalopathy 2/2 antipsychotic medication  -- Multifactorial Encephalopathy, Metabolic Encephalopathy 2/2 Pneumonia and  Toxic Encephalopathy 2/2 antipsychotic medication  -- Other - I will add my own diagnosis  -- Refer to Clinical Documentation Reviewer    Query created by: Vandana Mitchell on 4/26/2024 8:41 AM      Electronically signed by:  RAMYA DEE MD 4/29/2024 2:03 PM          "

## 2024-04-29 NOTE — PROGRESS NOTES
Spiritual Care Visit    Clinical Encounter Type  Visited With: Patient  Routine Visit: Follow-up       Patient grateful for pastoral care intervention.                                      Taxonomy  Intended Effects: Build relationship of care and support, Preserve dignity and respect  Methods: Offer spiritual/Buddhism support  Interventions: Assist someone with Advance Directives

## 2024-04-29 NOTE — PROGRESS NOTES
Occupational Therapy    Occupational Therapy    Evaluation    Patient Name: Bing Holliday  MRN: [id number]  Today's Date: 4/29/2024  Time Calculation  Start Time: 1023  Stop Time: 1044  Time Calculation (min): 21 min  Rm: 3131    Assessment  IP OT Assessment  OT Assessment: Pt pleasant and cooperative. Pt hallucinating throughout eval. Pt requires increased assist with ADL's and mobility. Recommend SNF to increase safety and independence with ADL's  Prognosis: Good  End of Session Communication: Bedside nurse  End of Session Patient Position: Up in chair, Alarm on    Plan:  Treatment Interventions: ADL retraining, Functional transfer training, UE strengthening/ROM, Endurance training, Neuromuscular reeducation  OT Frequency: 3 times per week  OT Discharge Recommendations: Moderate intensity level of continued care (SNF)  Equipment Recommended upon Discharge: Wheeled walker  OT Recommended Transfer Status:  (CGA)  OT - OK to Discharge: Yes (to next level of care)    Subjective     Current Problem:  1. Hypoglycemia        2. Swelling of right foot  Lower extremity venous duplex bilateral    Lower extremity venous duplex bilateral    CANCELED: Lower extremity venous duplex bilateral    CANCELED: Lower extremity venous duplex bilateral      3. Systemic lupus erythematosus, unspecified SLE type, unspecified organ involvement status (Multi)  Lower extremity venous duplex bilateral    Lower extremity venous duplex bilateral    CANCELED: Lower extremity venous duplex bilateral    CANCELED: Lower extremity venous duplex bilateral          General:  General  Reason for Referral: ADL's; Safety Assessment  Referred By: Aishwarya Dee MD  Past Medical History Relevant to Rehab: Admitted for hypoglycemia. PMH: SLE, Jaccoud arhtopathy, adrenal insuffiency, CKD3, COPD, Afib on Eliquis, CAD s/p CABG 2013, AAA  Family/Caregiver Present: No  Co-Treatment: PT  Co-Treatment Reason: for safety  Prior to Session Communication: Bedside  "nurse  Patient Position Received: Alarm on, Bed, 4 rail up (seizure pads in place)  Preferred Learning Style: verbal    History Of Present Illness  Bing Holliday is a 63 y.o. 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  When attempts made to question the patient about recent history patient repeatedly stated \"I do not know\" and refused to participate in any questioning.  Per ED staff and EMS staff patient was brought in for hypoglycemia.  She received glucagon at the facility and had refractory hypoglycemia after giving D50 en route.     When reached out to [facility] I was informed of:  -Patient has been having decreased oral intake but still taking in fluids and foods p.o.  This morning when nurse went to evaluate the patient patient was shaking and vitals were checked and found that the glucose was in the 50s.  She was then given IM glucagon and patient was refusing to take anything in by mouth at that time the nurse reached out to the doctor and recommended that the patient be transferred out to the emergency department.  Nurse states that the patient has not missed any of her medications and has still been getting her insulin as well as her daily prednisone in the setting of decreased oral intake.  But she received no meds this morning.    Precautions:  UE Weight Bearing Status: Weight Bearing as Tolerated (left UE)  Medical Precautions: Fall precautions, Seizure precautions  Precautions Comment: small effusion left elbow (bed/chair alarm)    Pain:  Pain Assessment  Pain Assessment: 0-10  Pain Score: 7 (L ankle pain that is worse when weightbearing)    Objective     Cognition:  Overall Cognitive Status:  (Pt having trouble iwth ST memory)  Attention:  (Pt hallucinating throughout eval. Pt was speaking to  agroup of >50 guys \"from a " "fraternity\". 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 It is aware that she has been hallucinating when it happens. Pt later stated we were in the pastors office)  Safety/Judgement:  (Pt with decreased safety and impulsive at times.)    Home Living:  Home Living Comments: Pt appears to have been LTC at [facility]. Pt states she was currently working with therapy.     Prior Function:  Pt is a questionable historian. Pt appears to have required assist with ADL's and mobility. Pt was ambulating using a wheeled walker    ADL:  Eating Assistance: Minimal  Grooming Assistance: Minimal  Bathing Assistance: Maximal  UE Dressing Assistance: Moderate  LE Dressing Assistance: Maximal  Toileting Assistance with Device: Total    Activity Tolerance:  Endurance: Decreased tolerance for upright activites    Bed Mobility/Transfers:   Bed Mobility  Bed Mobility: Yes  Bed Mobility 1  Bed Mobility 1: Supine to sitting  Level of Assistance 1: Moderate assistance  Bed Mobility Comments 1: to scoot hips to EOB  Transfers  Transfer: Yes  Transfer 1  Transfer From 1: Sit to  Transfer to 1: Stand  Transfer Level of Assistance 1: Contact guard  Transfers 2  Transfer From 2: Stand to  Transfer to 2: Sit  Transfer Level of Assistance 2: Contact guard    Sitting Balance:  Static Sitting Balance  Static Sitting-Comment/Number of Minutes: fair  Dynamic Sitting Balance  Dynamic Sitting-Comments: fair-    Standing Balance:  Static Standing Balance  Static Standing-Comment/Number of Minutes: fair  Dynamic Standing Balance  Dynamic Standing-Comments: fair- (Pt adamently refused to work with therapy)    Sensation:  Light Touch: No apparent deficits    Strength:  Strength Comments: R UE grossly 3/5; L shoulder not tested secondary to limited AROM flexion, L UE grossly 3/5    Coordination:  Coordination Comment: Pt with impaired B hand FM coordination secondary to limited AROM secondary to contractures     Hand Function:  Hand Function  Gross " Grasp:  (impaired secondary to arthritic changes)    Extremities: RUE   RUE :  (AROM shoulder about 90 degrees) and LUE   LUE:  (L shoulder trace AROM flexion)    Outcome Measures: [facility] Daily Activity  Putting on and taking off regular lower body clothing: A lot  Bathing (including washing, rinsing, drying): A lot  Putting on and taking off regular upper body clothing: A lot  Toileting, which includes using toilet, bedpan or urinal: A lot  Taking care of personal grooming such as brushing teeth: A lot  Eating Meals: A lot  Daily Activity - Total Score: 12                    EDUCATION:  Education  Individual(s) Educated:  (safety)  Education Provided:  (safety)  Education Documentation  Body Mechanics, taught by Candace Hopson OT at 4/29/2024  3:39 PM.  Learner: Patient  Readiness: Acceptance  Method: Explanation  Response: Verbalizes Understanding    Precautions, taught by Candace Hopson OT at 4/29/2024  3:39 PM.  Learner: Patient  Readiness: Acceptance  Method: Explanation  Response: Verbalizes Understanding    Education Comments  No comments found.        Goals:   Encounter Problems       Encounter Problems (Active)       OT Goals       OT Goal 1 (Progressing)       Start:  04/29/24    Expected End:  05/13/24       Pt will complete all bed mobility with supervision safely            OT Goal 2 (Progressing)       Start:  04/29/24    Expected End:  05/13/24       Pt will complete ADL's and mobility with good sit balance and fair+ stand balance            OT Goal 3 (Progressing)       Start:  04/29/24    Expected End:  05/13/24       Pt with complete all transfers safely with SBA           OT Goal 4 (Progressing)       Start:  04/29/24    Expected End:  05/13/24       Pt will complete UB dressing ADL's with SBA using adaptive device(s) as needed           OT Goal 5 (Progressing)       Start:  04/29/24    Expected End:  05/13/24       Pt will complete LB dressing ADL's with CGA using adaptive device(s) as needed                  Treatment: Pt was able to complete bed mobility with CGA and increased effort. Pt with fair sit balance on EOB. Pt completed all transfers with CGA. Pt resistive and pushing away any assist offered. Pt ambulated a few feet bed to chair using walker with CGA. Pt returned to and remained in bedside chair with chair alarm on and call light within reach.

## 2024-04-29 NOTE — PROGRESS NOTES
"   04/29/24 1114   Discharge Planning   Assistance Needed yes   Type of Residence Nursing home/residential care   Patient expects to be discharged to: [facility]   Does the patient need discharge transport arranged? Yes   RoundTrip coordination needed? Yes   Has discharge transport been arranged? No     Attempted to speak with patient at the bedside and was unable to . She was confused and not able to converse. She thought she was having an \"important meeting\" at this time, and could not talk.   "

## 2024-04-30 VITALS
SYSTOLIC BLOOD PRESSURE: 148 MMHG | OXYGEN SATURATION: 95 % | WEIGHT: 200.4 LBS | HEART RATE: 79 BPM | HEIGHT: 67 IN | DIASTOLIC BLOOD PRESSURE: 84 MMHG | BODY MASS INDEX: 31.45 KG/M2 | TEMPERATURE: 96.1 F | RESPIRATION RATE: 16 BRPM

## 2024-04-30 PROBLEM — E16.2 HYPOGLYCEMIA: Status: RESOLVED | Noted: 2024-04-24 | Resolved: 2024-04-30

## 2024-04-30 LAB
ALBUMIN SERPL BCP-MCNC: 2.9 G/DL (ref 3.4–5)
ANION GAP SERPL CALC-SCNC: 15 MMOL/L (ref 10–20)
ATRIAL RATE: 90 BPM
BUN SERPL-MCNC: 21 MG/DL (ref 6–23)
C COLI+JEJ+UPSA DNA STL QL NAA+PROBE: NOT DETECTED
C DIF TOX TCDA+TCDB STL QL NAA+PROBE: NOT DETECTED
CALCIUM SERPL-MCNC: 8.4 MG/DL (ref 8.6–10.3)
CHLORIDE SERPL-SCNC: 116 MMOL/L (ref 98–107)
CO2 SERPL-SCNC: 20 MMOL/L (ref 21–32)
CREAT SERPL-MCNC: 1.62 MG/DL (ref 0.5–1.05)
EC STX1 GENE STL QL NAA+PROBE: NOT DETECTED
EC STX2 GENE STL QL NAA+PROBE: NOT DETECTED
EGFRCR SERPLBLD CKD-EPI 2021: 36 ML/MIN/1.73M*2
ERYTHROCYTE [DISTWIDTH] IN BLOOD BY AUTOMATED COUNT: 18.7 % (ref 11.5–14.5)
GLUCOSE BLD MANUAL STRIP-MCNC: 131 MG/DL (ref 74–99)
GLUCOSE BLD MANUAL STRIP-MCNC: 160 MG/DL (ref 74–99)
GLUCOSE BLD MANUAL STRIP-MCNC: 184 MG/DL (ref 74–99)
GLUCOSE BLD MANUAL STRIP-MCNC: 325 MG/DL (ref 74–99)
GLUCOSE BLD MANUAL STRIP-MCNC: 350 MG/DL (ref 74–99)
GLUCOSE BLD MANUAL STRIP-MCNC: 86 MG/DL (ref 74–99)
GLUCOSE SERPL-MCNC: 109 MG/DL (ref 74–99)
HCT VFR BLD AUTO: 28.7 % (ref 36–46)
HGB BLD-MCNC: 8.8 G/DL (ref 12–16)
MAGNESIUM SERPL-MCNC: 1.4 MG/DL (ref 1.6–2.4)
MCH RBC QN AUTO: 29.3 PG (ref 26–34)
MCHC RBC AUTO-ENTMCNC: 30.7 G/DL (ref 32–36)
MCV RBC AUTO: 96 FL (ref 80–100)
NOROVIRUS GI + GII RNA STL NAA+PROBE: NOT DETECTED
NRBC BLD-RTO: 3.4 /100 WBCS (ref 0–0)
P AXIS: 55 DEGREES
P OFFSET: 197 MS
P ONSET: 137 MS
PHOSPHATE SERPL-MCNC: 2.8 MG/DL (ref 2.5–4.9)
PLATELET # BLD AUTO: 80 X10*3/UL (ref 150–450)
POTASSIUM SERPL-SCNC: 3.8 MMOL/L (ref 3.5–5.3)
PR INTERVAL: 156 MS
Q ONSET: 215 MS
QRS COUNT: 15 BEATS
QRS DURATION: 92 MS
QT INTERVAL: 332 MS
QTC CALCULATION(BAZETT): 406 MS
QTC FREDERICIA: 380 MS
R AXIS: -11 DEGREES
RBC # BLD AUTO: 3 X10*6/UL (ref 4–5.2)
RV RNA STL NAA+PROBE: NOT DETECTED
SALMONELLA DNA STL QL NAA+PROBE: NOT DETECTED
SHIGELLA DNA SPEC QL NAA+PROBE: NOT DETECTED
SODIUM SERPL-SCNC: 147 MMOL/L (ref 136–145)
T AXIS: 150 DEGREES
T OFFSET: 381 MS
V CHOLERAE DNA STL QL NAA+PROBE: NOT DETECTED
VENTRICULAR RATE: 90 BPM
WBC # BLD AUTO: 6.6 X10*3/UL (ref 4.4–11.3)
Y ENTEROCOL DNA STL QL NAA+PROBE: NOT DETECTED

## 2024-04-30 PROCEDURE — 85027 COMPLETE CBC AUTOMATED: CPT

## 2024-04-30 PROCEDURE — 99239 HOSP IP/OBS DSCHRG MGMT >30: CPT

## 2024-04-30 PROCEDURE — C9113 INJ PANTOPRAZOLE SODIUM, VIA: HCPCS

## 2024-04-30 PROCEDURE — 2500000004 HC RX 250 GENERAL PHARMACY W/ HCPCS (ALT 636 FOR OP/ED)

## 2024-04-30 PROCEDURE — 99232 SBSQ HOSP IP/OBS MODERATE 35: CPT | Performed by: PSYCHIATRY & NEUROLOGY

## 2024-04-30 PROCEDURE — 2500000006 HC RX 250 W HCPCS SELF ADMINISTERED DRUGS (ALT 637 FOR ALL PAYERS)

## 2024-04-30 PROCEDURE — 36415 COLL VENOUS BLD VENIPUNCTURE: CPT

## 2024-04-30 PROCEDURE — 2500000006 HC RX 250 W HCPCS SELF ADMINISTERED DRUGS (ALT 637 FOR ALL PAYERS): Performed by: PSYCHIATRY & NEUROLOGY

## 2024-04-30 PROCEDURE — 82947 ASSAY GLUCOSE BLOOD QUANT: CPT

## 2024-04-30 PROCEDURE — 84100 ASSAY OF PHOSPHORUS: CPT

## 2024-04-30 PROCEDURE — 2500000001 HC RX 250 WO HCPCS SELF ADMINISTERED DRUGS (ALT 637 FOR MEDICARE OP)

## 2024-04-30 PROCEDURE — 83735 ASSAY OF MAGNESIUM: CPT

## 2024-04-30 RX ORDER — LANOLIN ALCOHOL/MO/W.PET/CERES
800 CREAM (GRAM) TOPICAL DAILY
Status: ON HOLD
Start: 2024-05-01 | End: 2024-06-04

## 2024-04-30 RX ORDER — LOPERAMIDE HYDROCHLORIDE 2 MG/1
2 CAPSULE ORAL 4 TIMES DAILY PRN
Status: DISCONTINUED | OUTPATIENT
Start: 2024-04-30 | End: 2024-04-30 | Stop reason: HOSPADM

## 2024-04-30 RX ORDER — OLANZAPINE 5 MG/1
5 TABLET ORAL 2 TIMES DAILY
Status: ON HOLD
Start: 2024-04-30 | End: 2024-05-18

## 2024-04-30 RX ORDER — LANOLIN ALCOHOL/MO/W.PET/CERES
800 CREAM (GRAM) TOPICAL DAILY
Status: DISCONTINUED | OUTPATIENT
Start: 2024-05-01 | End: 2024-04-30 | Stop reason: HOSPADM

## 2024-04-30 RX ORDER — MAGNESIUM SULFATE HEPTAHYDRATE 40 MG/ML
2 INJECTION, SOLUTION INTRAVENOUS ONCE
Status: COMPLETED | OUTPATIENT
Start: 2024-04-30 | End: 2024-04-30

## 2024-04-30 RX ADMIN — LEVETIRACETAM 500 MG: 5 INJECTION INTRAVENOUS at 06:03

## 2024-04-30 RX ADMIN — ATORVASTATIN CALCIUM 40 MG: 40 TABLET, FILM COATED ORAL at 08:50

## 2024-04-30 RX ADMIN — FOLIC ACID 1 MG: 1 TABLET ORAL at 08:49

## 2024-04-30 RX ADMIN — PREDNISONE 20 MG: 20 TABLET ORAL at 08:50

## 2024-04-30 RX ADMIN — NIFEDIPINE 60 MG: 60 TABLET, FILM COATED, EXTENDED RELEASE ORAL at 06:05

## 2024-04-30 RX ADMIN — LEVETIRACETAM 500 MG: 5 INJECTION INTRAVENOUS at 17:59

## 2024-04-30 RX ADMIN — APIXABAN 2.5 MG: 2.5 TABLET, FILM COATED ORAL at 08:50

## 2024-04-30 RX ADMIN — MAGNESIUM SULFATE HEPTAHYDRATE 2 G: 40 INJECTION, SOLUTION INTRAVENOUS at 09:34

## 2024-04-30 RX ADMIN — Medication 1 TABLET: at 08:50

## 2024-04-30 RX ADMIN — PANTOPRAZOLE SODIUM 40 MG: 40 INJECTION, POWDER, FOR SOLUTION INTRAVENOUS at 08:50

## 2024-04-30 RX ADMIN — FAMOTIDINE 20 MG: 10 INJECTION, SOLUTION INTRAVENOUS at 08:50

## 2024-04-30 RX ADMIN — THIAMINE HCL TAB 100 MG 100 MG: 100 TAB at 08:49

## 2024-04-30 RX ADMIN — OLANZAPINE 5 MG: 5 TABLET, FILM COATED ORAL at 08:49

## 2024-04-30 ASSESSMENT — COGNITIVE AND FUNCTIONAL STATUS - GENERAL
PERSONAL GROOMING: A LOT
MOVING TO AND FROM BED TO CHAIR: A LOT
CLIMB 3 TO 5 STEPS WITH RAILING: TOTAL
DRESSING REGULAR LOWER BODY CLOTHING: A LOT
MOVING FROM LYING ON BACK TO SITTING ON SIDE OF FLAT BED WITH BEDRAILS: A LOT
WALKING IN HOSPITAL ROOM: A LOT
HELP NEEDED FOR BATHING: A LOT
TURNING FROM BACK TO SIDE WHILE IN FLAT BAD: A LOT
MOBILITY SCORE: 11
STANDING UP FROM CHAIR USING ARMS: A LOT
EATING MEALS: A LOT
DAILY ACTIVITIY SCORE: 12
DRESSING REGULAR UPPER BODY CLOTHING: A LOT
TOILETING: A LOT

## 2024-04-30 ASSESSMENT — PAIN - FUNCTIONAL ASSESSMENT: PAIN_FUNCTIONAL_ASSESSMENT: 0-10

## 2024-04-30 ASSESSMENT — PAIN SCALES - GENERAL: PAINLEVEL_OUTOF10: 0 - NO PAIN

## 2024-04-30 NOTE — PROGRESS NOTES
04/30/24 1609   Discharge Planning   Patient expects to be discharged to: [facility]   Does the patient need discharge transport arranged? Yes   RoundTrip coordination needed? Yes   Has discharge transport been arranged? Yes   What day is the transport expected? 04/30/24   What time is the transport expected? 1900     Son López spoke with [facility] regarding his mom returning to the facility at discharge. He is concerned facility is asking for [id number] for past care. He does not have durable power of  to assist his mom in liquidating her assets to pay for her care. She is not alert and cannot sign documents or power of  paperwork. Advised him he needs to get an elder  to gain guardianship to assist filing for medicaid and handling of her financial business. I spoke with Nereyda Hardy LPN, Lia[facility] and they will accept the patient back to their facility and will continue to assist with filing medicaid for her. I encouraged him to continue to work with them and do his best to get the documents they need to move forward within a timely manner and return emails and calls as well. He is leaving [facility] next week but will ask his sister to follow through with this. He was notified she is medically ready to return to [facility] today and we will arrange for today. Stretcher transport arranged for 1900 today, nursing, facility, and voice mail left for López with the  time. rosa Reddy, AVS, and discharge summary sent electronically to facility.

## 2024-04-30 NOTE — CARE PLAN
The patient's goals for the shift include      The clinical goals for the shift include remain safe with no fall or injury      Problem: Discharge Planning  Goal: Discharge to home or other facility with appropriate resources  Outcome: Progressing     Problem: Chronic Conditions and Co-morbidities  Goal: Patient's chronic conditions and co-morbidity symptoms are monitored and maintained or improved  Outcome: Progressing     Problem: Skin  Goal: Decreased wound size/increased tissue granulation at next dressing change  Outcome: Progressing  Flowsheets (Taken 4/30/2024 0614)  Decreased wound size/increased tissue granulation at next dressing change: Promote sleep for wound healing  Goal: Participates in plan/prevention/treatment measures  Outcome: Progressing  Goal: Prevent/manage excess moisture  Outcome: Progressing  Goal: Prevent/minimize sheer/friction injuries  Outcome: Progressing  Goal: Promote/optimize nutrition  Outcome: Progressing  Goal: Promote skin healing  Outcome: Progressing  Flowsheets (Taken 4/30/2024 0614)  Promote skin healing:   Turn/reposition every 2 hours/use positioning/transfer devices   Protective dressings over bony prominences   Assess skin/pad under line(s)/device(s)

## 2024-04-30 NOTE — CARE PLAN
Problem: Nutrition  Goal: Less than 5 days NPO/clear liquids  Outcome: Progressing  Goal: Oral intake greater than 50%  Outcome: Progressing  Goal: Oral intake greater 75%  Outcome: Progressing  Goal: Consume prescribed supplement  Outcome: Progressing  Goal: Adequate PO fluid intake  Outcome: Progressing  Goal: Nutrition support goals are met within 48 hrs  Outcome: Progressing  Goal: Nutrition support is meeting 75% of nutrient needs  Outcome: Progressing  Goal: Tube feed tolerance  Outcome: Progressing  Goal: BG  mg/dL  Outcome: Progressing  Goal: Lab values WNL  Outcome: Progressing  Goal: Electrolytes WNL  Outcome: Progressing  Goal: Promote healing  Outcome: Progressing  Goal: Maintain stable weight  Outcome: Progressing  Goal: Reduce weight from edema/fluid  Outcome: Progressing  Goal: Gradual weight gain  Outcome: Progressing  Goal: Improve ostomy output  Outcome: Progressing     Problem: Safety - Adult  Goal: Free from fall injury  Outcome: Progressing  Flowsheets (Taken 4/30/2024 1022)  Free from fall injury: Instruct family/caregiver on patient safety     Problem: Discharge Planning  Goal: Discharge to home or other facility with appropriate resources  Outcome: Progressing     Problem: Chronic Conditions and Co-morbidities  Goal: Patient's chronic conditions and co-morbidity symptoms are monitored and maintained or improved  Outcome: Progressing  Flowsheets (Taken 4/30/2024 1022)  Care Plan - Patient's Chronic Conditions and Co-Morbidity Symptoms are Monitored and Maintained or Improved: Monitor and assess patient's chronic conditions and comorbid symptoms for stability, deterioration, or improvement     Problem: Diabetes  Goal: Achieve decreasing blood glucose levels by end of shift  Outcome: Progressing  Goal: Increase stability of blood glucose readings by end of shift  Outcome: Progressing  Goal: Decrease in ketones present in urine by end of shift  Outcome: Progressing  Goal: Maintain  electrolyte levels within acceptable range throughout shift  Outcome: Progressing  Goal: Maintain glucose levels >70mg/dl to <250mg/dl throughout shift  Outcome: Progressing  Goal: No changes in neurological exam by end of shift  Outcome: Progressing  Goal: Learn about and adhere to nutrition recommendations by end of shift  Outcome: Progressing  Goal: Vital signs within normal range for age by end of shift  Outcome: Progressing  Goal: Increase self care and/or family involovement by end of shift  Outcome: Progressing  Goal: Receive DSME education by end of shift  Outcome: Progressing     Problem: Skin  Goal: Decreased wound size/increased tissue granulation at next dressing change  Outcome: Progressing  Flowsheets (Taken 4/30/2024 1022)  Decreased wound size/increased tissue granulation at next dressing change:   Promote sleep for wound healing   Protective dressings over bony prominences  Goal: Participates in plan/prevention/treatment measures  Outcome: Progressing  Flowsheets (Taken 4/30/2024 1022)  Participates in plan/prevention/treatment measures:   Discuss with provider PT/OT consult   Elevate heels  Goal: Prevent/manage excess moisture  Outcome: Progressing  Flowsheets (Taken 4/30/2024 1022)  Prevent/manage excess moisture:   Cleanse incontinence/protect with barrier cream   Moisturize dry skin  Goal: Prevent/minimize sheer/friction injuries  Outcome: Progressing  Flowsheets (Taken 4/30/2024 1022)  Prevent/minimize sheer/friction injuries:   Increase activity/out of bed for meals   Turn/reposition every 2 hours/use positioning/transfer devices  Goal: Promote/optimize nutrition  Outcome: Progressing  Flowsheets (Taken 4/30/2024 1022)  Promote/optimize nutrition:   Assist with feeding   Offer water/supplements/favorite foods  Goal: Promote skin healing  Outcome: Progressing  Flowsheets (Taken 4/30/2024 1022)  Promote skin healing:   Protective dressings over bony prominences   Turn/reposition every 2  hours/use positioning/transfer devices     Problem: Fall/Injury  Goal: Not fall by end of shift  Outcome: Progressing  Goal: Be free from injury by end of the shift  Outcome: Progressing  Goal: Verbalize understanding of personal risk factors for fall in the hospital  Outcome: Progressing  Goal: Verbalize understanding of risk factor reduction measures to prevent injury from fall in the home  Outcome: Progressing  Goal: Use assistive devices by end of the shift  Outcome: Progressing  Goal: Pace activities to prevent fatigue by end of the shift  Outcome: Progressing     Problem: Pain  Goal: Takes deep breaths with improved pain control throughout the shift  Outcome: Progressing  Goal: Turns in bed with improved pain control throughout the shift  Outcome: Progressing  Goal: Walks with improved pain control throughout the shift  Outcome: Progressing  Goal: Performs ADL's with improved pain control throughout shift  Outcome: Progressing  Goal: Participates in PT with improved pain control throughout the shift  Outcome: Progressing  Goal: Free from opioid side effects throughout the shift  Outcome: Progressing  Goal: Free from acute confusion related to pain meds throughout the shift  Outcome: Progressing   The patient's goals for the shift include      The clinical goals for the shift include remain safe with no fall or injury

## 2024-04-30 NOTE — PROGRESS NOTES
"Nutrition Follow Up Assessment:   Nutrition Assessment         Patient is a 63 y.o. female presenting with altered mental status and decreased intakes. Psych on consult noting delirium and steroid induced psychosis. She will continue to be monitored for suicidal ideation and risk. 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     4/30/24: follow up note. Psychiatry and Endocrinology on consult. Pt continues w/visual hallucinations today. Pt reports enjoying the Glucerna drinks and prefers vanilla or strawberry flavors. Pt endorses diarrhea x 3 days and agreeable to addition of daily yogurt. Pt meal intakes are variable.     Meal documentation: 4/30 75%B; 4/29 0%B, 25%L; 4/28 100%B, 75%L.     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     Nutrition History:  Energy Intake: Poor < 50 %  Food and Nutrient History: Pt returned from [facility] where she was apparently eatinf >75% the majority of her stay over the last few weeks, but the last 3 days show refusal to 25% intakes per nursing at the facility. The nurse was not sure if they were feeding her or not. Today, patient smiled at me and nodded her head yes to almost every question asked. She said yes to not liking the food; yes to the staff feeding her and yes to not eating. She has not been interested in anything to eat since admit either.  Vitamin/Herbal Supplement Use: Folic Acid, MVI, Thiamine - 100mg daily  Food Allergies/Intolerances:  None  GI Symptoms: None  Oral Problems: None Last admit (3/22) SLP eval: regular/thin     Pain Score: 0 - No pain    Anthropometrics:  Height: 170.2 cm (5' 7\")   Weight: 90.9 kg (200 lb 6.4 oz)   BMI (Calculated): 31.38  IBW/kg (Dietitian Calculated): 61.24 kg  Percent of IBW: 148.44 %       Weight History: "   Wt Readings from Last 10 Encounters:   04/24/24 90.9 kg (200 lb 6.4 oz)   03/19/24 96.5 kg (212 lb 11.9 oz)   03/11/24 96.5 kg (212 lb 11.2 oz)   02/14/24 86.2 kg (190 lb)   02/08/24 87.5 kg (193 lb)   01/20/24 99.3 kg (218 lb 14.7 oz)   01/17/24 96 kg (211 lb 10.3 oz)   12/26/23 93 kg (205 lb)   12/18/23 93.4 kg (205 lb 14.6 oz)   11/30/23 105 kg (231 lb)       Weight Change %:  Weight History / % Weight Change: loss of 5.6kg(5.8%) withing the past 30 days.  Significant Weight Loss: Yes    Nutrition Focused Physical Exam Findings:  defer: pt refuses  4/25; 4/30 pt w/visual hallucinations.  Subcutaneous Fat Loss:      Muscle Wasting:     Edema:  Edema: +2 mild  Edema Location: BLE  Physical Findings:  Skin: Positive (dry, bruising, rash in folds, dusky, wound rt buttock and wound left leg.)    Nutrition Significant Labs:  CBC Trend:   Results from last 7 days   Lab Units 04/30/24  0602 04/29/24  0606 04/28/24  0654 04/27/24  1659   WBC AUTO x10*3/uL 6.6 4.6 3.7* 2.5*   RBC AUTO x10*6/uL 3.00* 2.92* 2.50* 2.83*   HEMOGLOBIN g/dL 8.8* 8.8* 7.4* 8.4*   HEMATOCRIT % 28.7* 28.6* 24.1* 27.6*   MCV fL 96 98 96 98   PLATELETS AUTO x10*3/uL 80* 69* 63* 51*    , BMP Trend:   Results from last 7 days   Lab Units 04/30/24  0602 04/29/24  0606 04/28/24  0653 04/27/24  1122   GLUCOSE mg/dL 109* 94 99 133*   CALCIUM mg/dL 8.4* 8.2* 7.7* 7.7*   SODIUM mmol/L 147* 148* 145 144   POTASSIUM mmol/L 3.8 3.7 3.6 4.0   CO2 mmol/L 20* 20* 21 20*   CHLORIDE mmol/L 116* 117* 117* 118*   BUN mg/dL 21 20 23 22   CREATININE mg/dL 1.62* 1.60* 1.70* 1.77*    , A1C:  Lab Results   Component Value Date    HGBA1C 8.4 (H) 04/24/2024   , BG POCT trend:   Results from last 7 days   Lab Units 04/30/24  1135 04/30/24  0814 04/30/24  0406 04/30/24  0106 04/29/24  1955   POCT GLUCOSE mg/dL 160* 86 131* 184* 80    , Vit D:   Lab Results   Component Value Date    VITD25 34 12/10/2022    , Vit B12:   Lab Results   Component Value Date    UADBNHEV87 1,499  (H) 03/20/2024    , Iron Panel:   Lab Results   Component Value Date    IRON 153 (H) 04/25/2024    TIBC  04/25/2024      Comment:      One or more of the analytes used in this calculation is outside of the analytical measurement range.      FERRITIN 1,060 (H) 04/25/2024    , Folate:   Lab Results   Component Value Date    FOLATE >24.0 04/25/2024    , CF Vitamin Labs:   Lab Results   Component Value Date    VITD25 34 12/10/2022        Nutrition Specific Medications:  Reviewed    I/O:   Last BM Date: 04/29/24; Stool Appearance: Loose (04/30/24 0900)    Dietary Orders (From admission, onward)       Start     Ordered    04/30/24 1450  Oral nutritional supplements  Until discontinued        Comments: No chocolate   Question Answer Comment   Deliver with Breakfast    Deliver with Dinner    Deliver with Lunch    Select supplement: Glucerna Shake        04/30/24 1450    04/30/24 1450  Snacks  Until discontinued        Question:  Deliver with  Answer:  Breakfast  Comment:  strawberry yogurt daily at breakfast    04/30/24 1450    04/24/24 1822  May Participate in Room Service With Assistance  Once        Question:  .  Answer:  Yes    04/24/24 1823    04/24/24 1617  Adult diet Regular  Diet effective now        Question:  Diet type  Answer:  Regular    04/24/24 1616                     Estimated Needs:   Total Energy Estimated Needs (kCal):  (1802 kcal (MSJ x 1.2 x 1.0) or 20 kcal/kg)     Total Protein Estimated Needs (g):  (91-100g protein (1.0-1.1g/kg))     Total Fluid Estimated Needs (mL):  (1802 mL (1mL/kcal))           Nutrition Diagnosis   Malnutrition Diagnosis  Patient has Malnutrition Diagnosis: Yes  Diagnosis Status: Ongoing  Malnutrition Diagnosis: Moderate malnutrition related to acute disease or injury  As Evidenced by: significant weight loss of 5.6kg(5.8%) since last admit 30 days ago and refusing intakes for the last 3-4 days.            Nutrition Interventions/Recommendations         Nutrition  Prescription:  Individualized Nutrition Prescription Provided for : Diet: continue Regular diet as ordered.        Nutrition Interventions:   Interventions: Meals and snacks, Medical food supplement, Feeding assistance  Meals and Snacks: General healthful diet  Goal: will provide house trays as pt currently not appropriate to order  Medical Food Supplement: Commercial beverage, Other (Comment)  Goal: Will increase Glucern Shakes to TID w/meals (220kcal, 10g pro per 8oz serving)  Feeding Assistance: Meal set-up, Menu selection assistance  Goal: Pt may need assistance with feeding - hands appear to be contracted more then her usual.  Additional Interventions: will provide strawberry yogurt daily at breakfast.    Collaboration and Referral of Nutrition Care: Collaboration by nutrition professional with other providers (AMINAH Christian)    Nutrition Education:   4/25: Pt not appropriate at this time. Tried to help her order, but she will not respond to me. 4/30: Encouraged PO intakes and ONS; however, pt currently w/hallucinations.        Nutrition Monitoring and Evaluation   Food/Nutrient Related History Monitoring  Monitoring and Evaluation Plan: Amount of food  Amount of Food: Estimated amout of food  Criteria: will meet 75% of estimated energy needs w/meals and ONS.    Body Composition/Growth/Weight History  Monitoring and Evaluation Plan: Weight  Weight: Measured weight  Criteria: no further wt loss    Biochemical Data, Medical Tests and Procedures  Monitoring and Evaluation Plan: Electrolyte/renal panel, Glucose/endocrine profile  Electrolyte and Renal Panel: Sodium, Potassium, Phosphorus, Magnesium, Creatinine, BUN  Criteria: WNR  Glucose/Endocrine Profile: Glucose, casual  Criteria: BG 70-180mg/dL    Nutrition Focused Physical Findings  Monitoring and Evaluation Plan: Digestive System  Digestive System: Decrease in appetite, Diarrhea  Criteria: PO intakes and diarreha to improve.  Skin: Impaired wound  healing  Criteria: skin to heal.       Time Spent/Follow-up Reminder:   Time Spent (min): 30 minutes  Last Date of Nutrition Visit: 04/30/24  Nutrition Follow-Up Needed?: 3-8 days  Follow up Comment: RAMON

## 2024-04-30 NOTE — DISCHARGE SUMMARY
Discharge Diagnosis  Hypoglycemia    Issues Requiring Follow-Up  Hypoglycemia  Hallucinations    Discharge Meds     Your medication list        START taking these medications        Instructions Last Dose Given Next Dose Due   magnesium oxide 400 mg (241.3 mg magnesium) tablet  Commonly known as: Mag-Ox  Start taking on: May 1, 2024      Take 2 tablets (800 mg) by mouth once daily. Do not fill before May 1, 2024.       OLANZapine 5 mg tablet  Commonly known as: ZyPREXA      Take 1 tablet (5 mg) by mouth 2 times a day.              CHANGE how you take these medications        Instructions Last Dose Given Next Dose Due   atorvastatin 40 mg tablet  Commonly known as: Lipitor  What changed: when to take this      Take 1 tablet (40 mg) by mouth once daily.              CONTINUE taking these medications        Instructions Last Dose Given Next Dose Due   acetaminophen 650 mg suppository  Commonly known as: Tylenol           acetaminophen 325 mg tablet  Commonly known as: Tylenol           albuterol 90 mcg/actuation inhaler           alum-mag hydroxide-simeth 200-200-20 mg/5 mL oral suspension  Commonly known as: Mylanta           apixaban 2.5 mg tablet  Commonly known as: Eliquis      Take 1 tablet (2.5 mg) by mouth 2 times a day.       bisacodyl 10 mg suppository  Commonly known as: Dulcolax           Dexcom G6  misc  Generic drug: blood-glucose meter,continuous      Use as instructed       Dexcom G6 Sensor device  Generic drug: blood-glucose sensor      Use to check sugars 3 times daily       Dexcom G6 Transmitter device  Generic drug: blood-glucose transmitter device      Use as instructed       Fleet Enema 19-7 gram/118 mL enema enema  Generic drug: sodium phosphates           folic acid 1 mg tablet  Commonly known as: Folvite      TAKE 1 TABLET BY MOUTH EVERY DAY       levETIRAcetam 500 mg tablet  Commonly known as: Keppra      Take 1 tablet (500 mg) by mouth every 12 hours.       meclizine 25 mg  "tablet,chewable  Commonly known as: Antivert           melatonin 5 mg tablet           Milk Of Magnesia Concentrated 2,400 mg/10 mL suspension suspension  Generic drug: magnesium hydroxide           Mucinex 600 mg 12 hr tablet  Generic drug: guaiFENesin           multivitamin tablet           mycophenolate 500 mg tablet  Commonly known as: Cellcept      TAKE 2 TABLETS BY MOUTH TWICE A DAY       pantoprazole 40 mg EC tablet  Commonly known as: ProtoNix      TAKE 1 TABLET BY MOUTH EVERY DAY       pen needle, diabetic 31 gauge x 5/16\" needle      Use to inject 1-4 times daily as directed.       predniSONE 5 mg tablet  Commonly known as: Deltasone           sodium polystyrene 15 gram/60 mL suspension  Commonly known as: Kayexalate           thiamine 100 mg tablet  Commonly known as: Vitamin B-1           Trelegy Ellipta 200-62.5-25 mcg blister with device  Generic drug: fluticasone-umeclidin-vilanter      Inhale 1 puff once daily.              STOP taking these medications      azithromycin 250 mg tablet  Commonly known as: Zithromax        insulin lispro 100 unit/mL injection  Commonly known as: Admelog SoloStar U-100 Insulin        Lantus U-100 Insulin 100 unit/mL injection  Generic drug: insulin glargine        NIFEdipine ER 60 mg 24 hr tablet  Commonly known as: Adalat CC        risperiDONE 0.5 mg disintegrating tablet  Commonly known as: RisperDAL M-TAB                  Where to Get Your Medications        Information about where to get these medications is not yet available    Ask your nurse or doctor about these medications  magnesium oxide 400 mg (241.3 mg magnesium) tablet  OLANZapine 5 mg tablet         Test Results Pending At Discharge  Pending Labs       No current pending labs.            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  In the ED, she was hemodynamically stable.  Initial labs showed elevated CR 1.96, Hgb 7.0, PT OT glucose 53.  UA showed +2 proteinuria, large leukocyte esterase CXR showed new right upper lobe opacity, concerning for pneumonia.  Strep pneumo urinary antigen positive.  Infectious workup was performed and it is likely she was encephalopathic secondary to underlying CAP, and completed a course of antibiotics.  Her urine cultures did return positive showing VRE, however believed to be colonized and was not treated due to medical improvement following treatment for pneumonia no further evidence of infection on labs or vitals.  She became hypoglycemic likely due to poor oral intake from encephalopathy, and started on stress dose steroids due to chronic prednisone use at home for adrenal insufficiency.  Endocrinology was consulted, and all insulin and hypoglycemic agents were discontinued.  Patient clinically improved and was mentating at baseline, and home prednisone 20 mg daily was resumed.  All of her home insulin was discontinued on discharge due to tenuous BG in the 100s on steroids.  We recommend no insulin following discharge due to high risk for future hypoglycemic events, unless provider at the facility deems otherwise but would advise insulin at very low doses.  She should follow-up with her endocrinologist on discharge.    Of note, hospitalization her hemoglobin down trended to 6.4, baseline 7-8.  No overt signs of bleeding, CT A/P negative.  She was transfused 1 unit of blood and her hemoglobin stabilized for the rest of her hospitalization.  Anemia workup done and likely multifactorial    Of note, she presented with left antecubital fossa injury that was to be self-inflicted.  She denied any suicidal or homicidal ideations.  CT was obtained and showed small effusion.  Concern for septic arthritis initially  due to elevated inflammatory markers, and orthopedic consulted.  On evaluation, we did believe it was not likely septic arthritis as noted effusion appreciated.  Further workup required.  Due to self-inflicted injury, psychiatry consulted and sitter ordered.  She was endorsing hallucinations, and psychiatry dropped her Risperdal and increased Zyprexa dosing.  She will need to follow-up outpatient psychiatry for adjustment of her Zyprexa due to hallucinations.    Patient was discharged to back to facility in stable condition.        Pertinent Physical Exam At Time of Discharge  Physical Exam  General: Not in acute distress, A&O x 3, alert, well-developed,  HEENT: Normocephalic, atraumatic, EOMI, moist mucous membranes  Neck: Neck supple, trachea midline, no evidence of trauma  Cardiovascular: RRR, S1 and S2 appreciated, no murmurs rubs gallops appreciated, distal pulses 2+ bilaterally   Respiratory: Vesicular breath sounds appreciated bilaterally, no adventitious sounds appreciated, no increased work of breathing on RA  GI: Abdomen soft, nondistended, nontender  Extremities: 3+ non-pitting edema LLE 2+ pitting edema RLE  MSK: L elbow mildly TTP, scarring noted in antecubital fossa, full ROM  Neuro: A&O X3, no focal deficits, strength and sensation intact bilaterally, moving all extremities, withdrawing to pain which is diffuse and throughout with extremely light palpation.    Skin: Warm and dry, without lesions or rashes    Outpatient Follow-Up  Future Appointments   Date Time Provider Department Center   8/13/2024 10:30 AM Michael Arenas MD [id number] [place]         Steve Martinez DO

## 2024-04-30 NOTE — HOSPITAL COURSE
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  In the ED, she was hemodynamically stable.  Initial labs showed elevated CR 1.96, Hgb 7.0, PT OT glucose 53.  UA showed +2 proteinuria, large leukocyte esterase CXR showed new right upper lobe opacity, concerning for pneumonia.  Strep pneumo urinary antigen positive.  Infectious workup was performed and it is likely she was encephalopathic secondary to underlying CAP, and completed a course of antibiotics.  She became hypoglycemic likely due to poor oral intake from encephalopathy, and started on stress dose steroids due to chronic prednisone use at home for adrenal insufficiency.  Endocrinology was consulted, and all insulin and hypoglycemic agents were discontinued.  Patient clinically improved and was mentating at baseline, and home prednisone 20 mg daily was resumed.  All of her home insulin was discontinued on discharge due to tenuous BG in the 100s on steroids.  We recommend no insulin following discharge due to high risk for future hypoglycemic events, unless provider at the facility deems otherwise but would advise insulin at very low doses.  She should follow-up with her endocrinologist on discharge.    Of note, hospitalization her hemoglobin down trended to 6.4, baseline 7-8.  No overt signs of bleeding, CT A/P negative.  She was transfused 1 unit of blood and her hemoglobin stabilized for the rest of her hospitalization.  Anemia workup done and likely multifactorial    Of note, she presented with left antecubital fossa injury that was to be self-inflicted.  She denied any suicidal or homicidal ideations.  CT was obtained and showed small effusion.  Concern for septic arthritis initially due to  elevated inflammatory markers, and orthopedic consulted.  On evaluation, we did believe it was not likely septic arthritis as noted effusion appreciated.  Further workup required.  Due to self-inflicted injury, psychiatry consulted and sitter ordered.  She was endorsing hallucinations, and psychiatry dropped her Risperdal and increased Zyprexa dosing.  She will need to follow-up outpatient psychiatry for adjustment of her Zyprexa due to hallucinations.    Patient was discharged to back to facility in stable condition.

## 2024-04-30 NOTE — PROGRESS NOTES
"Bing Holliday is a 63 y.o. female on day 5 of admission presenting with Hypoglycemia.    SUBJECTIVE:    Pt doing better. Out of bed sitting in chair. Less intrusive VH, not bothering her. No AH   Not depressed  Has  been less irritable and compliant  Patient is on Zyprexa 5 mg twice daily and is tolerating without any side effects      Exam:  Vital Signs: /76 (BP Location: Right arm)   Pulse 74   Temp 35.5 °C (95.9 °F) (Temporal)   Resp 18   Ht 1.702 m (5' 7\")   Wt 90.9 kg (200 lb 6.4 oz)   LMP  (LMP Unknown)   SpO2 93%   BMI 31.39 kg/m²   Musculoskeletal:  normal  Gait/Station: in bed      Mental Status Exam:  VH++  Cognition:  Insight: poor  Judgment: poor     Results for orders placed or performed during the hospital encounter of 04/24/24 (from the past 96 hour(s))   Legionella Antigen, Urine    Specimen: Urine   Result Value Ref Range    L. pneumophila Urine Ag Negative Negative   Streptococcus pneumoniae Antigen, Urine    Specimen: Urine   Result Value Ref Range    Streptococcus pneumoniae Ag, Urine Positive (A) Negative   Urine culture    Specimen: Clean Catch/Voided; Urine   Result Value Ref Range    Urine Culture 20,000 - 80,000 Enterococcus faecium (A)        Susceptibility    Enterococcus faecium - MICROSCAN     Ampicillin  Resistant      Ciprofloxacin  Resistant      Daptomycin  Susceptible-dose dependent      Levofloxacin  Resistant      Linezolid  Susceptible      Nitrofurantoin  Susceptible      Penicillin  Resistant      Trimethoprim/Sulfamethoxazole  Resistant      Vancomycin  Resistant    POCT GLUCOSE   Result Value Ref Range    POCT Glucose 72 (L) 74 - 99 mg/dL   POCT GLUCOSE   Result Value Ref Range    POCT Glucose 87 74 - 99 mg/dL   POCT GLUCOSE   Result Value Ref Range    POCT Glucose 97 74 - 99 mg/dL   POCT GLUCOSE   Result Value Ref Range    POCT Glucose 106 (H) 74 - 99 mg/dL   POCT GLUCOSE   Result Value Ref Range    POCT Glucose 88 74 - 99 mg/dL   POCT GLUCOSE   Result Value " Ref Range    POCT Glucose 113 (H) 74 - 99 mg/dL   POCT GLUCOSE   Result Value Ref Range    POCT Glucose 128 (H) 74 - 99 mg/dL   CBC and Auto Differential   Result Value Ref Range    WBC 3.4 (L) 4.4 - 11.3 x10*3/uL    nRBC 0.6 (H) 0.0 - 0.0 /100 WBCs    RBC 2.19 (L) 4.00 - 5.20 x10*6/uL    Hemoglobin 6.6 (L) 12.0 - 16.0 g/dL    Hematocrit 22.0 (L) 36.0 - 46.0 %     (H) 80 - 100 fL    MCH 30.1 26.0 - 34.0 pg    MCHC 30.0 (L) 32.0 - 36.0 g/dL    RDW 20.4 (H) 11.5 - 14.5 %    Platelets 55 (L) 150 - 450 x10*3/uL    Neutrophils % 71.5 40.0 - 80.0 %    Immature Granulocytes %, Automated 3.6 (H) 0.0 - 0.9 %    Lymphocytes % 15.4 13.0 - 44.0 %    Monocytes % 8.6 2.0 - 10.0 %    Eosinophils % 0.6 0.0 - 6.0 %    Basophils % 0.3 0.0 - 2.0 %    Neutrophils Absolute 2.41 1.20 - 7.70 x10*3/uL    Immature Granulocytes Absolute, Automated 0.12 0.00 - 0.70 x10*3/uL    Lymphocytes Absolute 0.52 (L) 1.20 - 4.80 x10*3/uL    Monocytes Absolute 0.29 0.10 - 1.00 x10*3/uL    Eosinophils Absolute 0.02 0.00 - 0.70 x10*3/uL    Basophils Absolute 0.01 0.00 - 0.10 x10*3/uL   Magnesium   Result Value Ref Range    Magnesium 1.67 1.60 - 2.40 mg/dL   Comprehensive metabolic panel   Result Value Ref Range    Glucose 133 (H) 74 - 99 mg/dL    Sodium 144 136 - 145 mmol/L    Potassium 4.0 3.5 - 5.3 mmol/L    Chloride 118 (H) 98 - 107 mmol/L    Bicarbonate 20 (L) 21 - 32 mmol/L    Anion Gap 10 10 - 20 mmol/L    Urea Nitrogen 22 6 - 23 mg/dL    Creatinine 1.77 (H) 0.50 - 1.05 mg/dL    eGFR 32 (L) >60 mL/min/1.73m*2    Calcium 7.7 (L) 8.6 - 10.3 mg/dL    Albumin 2.5 (L) 3.4 - 5.0 g/dL    Alkaline Phosphatase 93 33 - 136 U/L    Total Protein 4.6 (L) 6.4 - 8.2 g/dL    AST 19 9 - 39 U/L    Bilirubin, Total 0.3 0.0 - 1.2 mg/dL    ALT 18 7 - 45 U/L   Morphology   Result Value Ref Range    RBC Morphology See Below     Polychromasia Mild     Ovalocytes Few     Katarzyna Cells Few     Acanthocytes Few    Prepare RBC: 1 Units, Irradiated   Result Value Ref Range     PRODUCT CODE [id number]     Unit Number [id number]     Unit ABO O     Unit RH POS     XM INTEP COMP     Dispense Status TR     Blood Expiration Date May 03, 2024 23:59 EDT     PRODUCT BLOOD TYPE 5100     UNIT VOLUME 350    POCT GLUCOSE   Result Value Ref Range    POCT Glucose 76 74 - 99 mg/dL   CBC   Result Value Ref Range    WBC 2.5 (L) 4.4 - 11.3 x10*3/uL    nRBC 1.2 (H) 0.0 - 0.0 /100 WBCs    RBC 2.83 (L) 4.00 - 5.20 x10*6/uL    Hemoglobin 8.4 (L) 12.0 - 16.0 g/dL    Hematocrit 27.6 (L) 36.0 - 46.0 %    MCV 98 80 - 100 fL    MCH 29.7 26.0 - 34.0 pg    MCHC 30.4 (L) 32.0 - 36.0 g/dL    RDW 19.0 (H) 11.5 - 14.5 %    Platelets 51 (L) 150 - 450 x10*3/uL   POCT GLUCOSE   Result Value Ref Range    POCT Glucose 109 (H) 74 - 99 mg/dL   POCT GLUCOSE   Result Value Ref Range    POCT Glucose 87 74 - 99 mg/dL   Magnesium   Result Value Ref Range    Magnesium 1.36 (L) 1.60 - 2.40 mg/dL   Renal Function Panel   Result Value Ref Range    Glucose 99 74 - 99 mg/dL    Sodium 145 136 - 145 mmol/L    Potassium 3.6 3.5 - 5.3 mmol/L    Chloride 117 (H) 98 - 107 mmol/L    Bicarbonate 21 21 - 32 mmol/L    Anion Gap 11 10 - 20 mmol/L    Urea Nitrogen 23 6 - 23 mg/dL    Creatinine 1.70 (H) 0.50 - 1.05 mg/dL    eGFR 34 (L) >60 mL/min/1.73m*2    Calcium 7.7 (L) 8.6 - 10.3 mg/dL    Phosphorus 3.3 2.5 - 4.9 mg/dL    Albumin 2.4 (L) 3.4 - 5.0 g/dL   CBC   Result Value Ref Range    WBC 3.7 (L) 4.4 - 11.3 x10*3/uL    nRBC 0.0 0.0 - 0.0 /100 WBCs    RBC 2.50 (L) 4.00 - 5.20 x10*6/uL    Hemoglobin 7.4 (L) 12.0 - 16.0 g/dL    Hematocrit 24.1 (L) 36.0 - 46.0 %    MCV 96 80 - 100 fL    MCH 29.6 26.0 - 34.0 pg    MCHC 30.7 (L) 32.0 - 36.0 g/dL    RDW 19.1 (H) 11.5 - 14.5 %    Platelets 63 (L) 150 - 450 x10*3/uL   POCT GLUCOSE   Result Value Ref Range    POCT Glucose 122 (H) 74 - 99 mg/dL   POCT GLUCOSE   Result Value Ref Range    POCT Glucose 202 (H) 74 - 99 mg/dL   POCT GLUCOSE   Result Value Ref Range    POCT Glucose 124 (H) 74 - 99 mg/dL    POCT GLUCOSE   Result Value Ref Range    POCT Glucose 165 (H) 74 - 99 mg/dL   POCT GLUCOSE   Result Value Ref Range    POCT Glucose 150 (H) 74 - 99 mg/dL   POCT GLUCOSE   Result Value Ref Range    POCT Glucose 104 (H) 74 - 99 mg/dL   Magnesium   Result Value Ref Range    Magnesium 1.54 (L) 1.60 - 2.40 mg/dL   Renal Function Panel   Result Value Ref Range    Glucose 94 74 - 99 mg/dL    Sodium 148 (H) 136 - 145 mmol/L    Potassium 3.7 3.5 - 5.3 mmol/L    Chloride 117 (H) 98 - 107 mmol/L    Bicarbonate 20 (L) 21 - 32 mmol/L    Anion Gap 15 10 - 20 mmol/L    Urea Nitrogen 20 6 - 23 mg/dL    Creatinine 1.60 (H) 0.50 - 1.05 mg/dL    eGFR 36 (L) >60 mL/min/1.73m*2    Calcium 8.2 (L) 8.6 - 10.3 mg/dL    Phosphorus 2.4 (L) 2.5 - 4.9 mg/dL    Albumin 2.9 (L) 3.4 - 5.0 g/dL   CBC   Result Value Ref Range    WBC 4.6 4.4 - 11.3 x10*3/uL    nRBC 2.8 (H) 0.0 - 0.0 /100 WBCs    RBC 2.92 (L) 4.00 - 5.20 x10*6/uL    Hemoglobin 8.8 (L) 12.0 - 16.0 g/dL    Hematocrit 28.6 (L) 36.0 - 46.0 %    MCV 98 80 - 100 fL    MCH 30.1 26.0 - 34.0 pg    MCHC 30.8 (L) 32.0 - 36.0 g/dL    RDW 18.8 (H) 11.5 - 14.5 %    Platelets 69 (L) 150 - 450 x10*3/uL   POCT GLUCOSE   Result Value Ref Range    POCT Glucose 97 74 - 99 mg/dL   POCT GLUCOSE   Result Value Ref Range    POCT Glucose 124 (H) 74 - 99 mg/dL   C. difficile, PCR    Specimen: Stool   Result Value Ref Range    C. difficile, PCR Not Detected Not Detected   Stool Pathogen Panel, PCR    Specimen: Stool   Result Value Ref Range    Campylobacter Group Not Detected Not Detected    Salmonella species Not Detected Not Detected    Shigella species Not Detected Not Detected    Vibrio Group Not Detected Not Detected    Yersinia Enterocolitica Not Detected Not Detected    Shiga Toxin 1 Not Detected Not Detected    Shiga Toxin 2 Not Detected Not Detected    Norovirus GI/GII Not Detected Not Detected    Rotavirus A Not Detected Not Detected   POCT GLUCOSE   Result Value Ref Range    POCT Glucose 101 (H)  74 - 99 mg/dL   POCT GLUCOSE   Result Value Ref Range    POCT Glucose 80 74 - 99 mg/dL   POCT GLUCOSE   Result Value Ref Range    POCT Glucose 184 (H) 74 - 99 mg/dL   POCT GLUCOSE   Result Value Ref Range    POCT Glucose 131 (H) 74 - 99 mg/dL   Magnesium   Result Value Ref Range    Magnesium 1.40 (L) 1.60 - 2.40 mg/dL   Renal Function Panel   Result Value Ref Range    Glucose 109 (H) 74 - 99 mg/dL    Sodium 147 (H) 136 - 145 mmol/L    Potassium 3.8 3.5 - 5.3 mmol/L    Chloride 116 (H) 98 - 107 mmol/L    Bicarbonate 20 (L) 21 - 32 mmol/L    Anion Gap 15 10 - 20 mmol/L    Urea Nitrogen 21 6 - 23 mg/dL    Creatinine 1.62 (H) 0.50 - 1.05 mg/dL    eGFR 36 (L) >60 mL/min/1.73m*2    Calcium 8.4 (L) 8.6 - 10.3 mg/dL    Phosphorus 2.8 2.5 - 4.9 mg/dL    Albumin 2.9 (L) 3.4 - 5.0 g/dL   CBC   Result Value Ref Range    WBC 6.6 4.4 - 11.3 x10*3/uL    nRBC 3.4 (H) 0.0 - 0.0 /100 WBCs    RBC 3.00 (L) 4.00 - 5.20 x10*6/uL    Hemoglobin 8.8 (L) 12.0 - 16.0 g/dL    Hematocrit 28.7 (L) 36.0 - 46.0 %    MCV 96 80 - 100 fL    MCH 29.3 26.0 - 34.0 pg    MCHC 30.7 (L) 32.0 - 36.0 g/dL    RDW 18.7 (H) 11.5 - 14.5 %    Platelets 80 (L) 150 - 450 x10*3/uL   POCT GLUCOSE   Result Value Ref Range    POCT Glucose 86 74 - 99 mg/dL   POCT GLUCOSE   Result Value Ref Range    POCT Glucose 160 (H) 74 - 99 mg/dL           Impression:   Delirium- resolving    Recommendations:    1. zyprexa to 5 mg po bid-to be continued  2. Can be discharged from psych standpoint when medically stable    Thank you for allowing us to participate in the care of this patient.       Yaniv Lee MD  4/30/2024  3:23 PM

## 2024-04-30 NOTE — DISCHARGE INSTRUCTIONS
Please call your primary care physician's office and make an appointment for follow-up from this visit for sometime in the next 2-3 days.    Please resume your home dose of prednisone 20 mg daily    Please start take Zyprexa 5 mg twice daily    Please follow-up with your rheumatologist, psychiatrist, and endocrinologist.    Please take all your medications as directed.     If you experience any further concerning symptoms, or return of symptoms, please make sure to seek immediate medical attention.    Thank you for trusting our team and allowing us to participate in your healthcare.  We hope you feel better soon.    [facility] Teaching Service.

## 2024-05-01 ENCOUNTER — TELEPHONE (OUTPATIENT)
Dept: PRIMARY CARE | Facility: CLINIC | Age: 64
End: 2024-05-01
Payer: MEDICARE

## 2024-05-01 NOTE — TELEPHONE ENCOUNTER
Joe from [facility] is calling because she states they received the notes from 3/6/24 but per insurance purposes, it needs to state that this patient is still using her contour glucose monitor, Dexcom  Ok to addend the notes from 3/6/24?    Please fax to [phone]    [facility] phone # [phone]

## 2024-05-06 NOTE — PROGRESS NOTES
Spiritual Care Visit    Clinical Encounter Type  Visited With: Patient  Routine Visit: Introduction  Continue Visiting: No                                            Taxonomy  Intended Effects: Preserve dignity and respect, Promote sense of peace, Nemo affirmation, Helping someone feel comforted  Methods: Offer spiritual/[place] support  Interventions: Share words of hope and inspiration, [place]    Patient recognized the  from previous visits.  Patient talked about her nemo and stories of nemo from people at her Hinduism.  Patient talked about living with her son and about her daughter who works often. Patient has seven grandchildren and a great grandchild that bring her stephanie.   prayed at her request.   No

## 2024-05-07 ENCOUNTER — APPOINTMENT (OUTPATIENT)
Dept: RADIOLOGY | Facility: HOSPITAL | Age: 64
DRG: 981 | End: 2024-05-07
Payer: MEDICARE

## 2024-05-07 ENCOUNTER — APPOINTMENT (OUTPATIENT)
Dept: CARDIOLOGY | Facility: HOSPITAL | Age: 64
DRG: 981 | End: 2024-05-07
Payer: MEDICARE

## 2024-05-07 ENCOUNTER — APPOINTMENT (OUTPATIENT)
Dept: RHEUMATOLOGY | Facility: CLINIC | Age: 64
End: 2024-05-07
Payer: MEDICARE

## 2024-05-07 ENCOUNTER — HOSPITAL ENCOUNTER (INPATIENT)
Facility: HOSPITAL | Age: 64
LOS: 11 days | Discharge: NURSING, REHAB OR LONG-TERM CARE | DRG: 981 | End: 2024-05-18
Attending: EMERGENCY MEDICINE | Admitting: EMERGENCY MEDICINE
Payer: MEDICARE

## 2024-05-07 DIAGNOSIS — R00.1 BRADYCARDIA, UNSPECIFIED: ICD-10-CM

## 2024-05-07 DIAGNOSIS — T68.XXXA HYPOTHERMIA, INITIAL ENCOUNTER: ICD-10-CM

## 2024-05-07 DIAGNOSIS — R44.3 HALLUCINATIONS: ICD-10-CM

## 2024-05-07 DIAGNOSIS — Z95.0 PACEMAKER: ICD-10-CM

## 2024-05-07 DIAGNOSIS — J18.9 ACUTE PNEUMONIA: ICD-10-CM

## 2024-05-07 DIAGNOSIS — R41.0 DISORIENTATION: Primary | ICD-10-CM

## 2024-05-07 DIAGNOSIS — J44.1 ACUTE EXACERBATION OF CHRONIC OBSTRUCTIVE PULMONARY DISEASE (COPD) (MULTI): ICD-10-CM

## 2024-05-07 DIAGNOSIS — R57.9 SHOCK, UNSPECIFIED (MULTI): ICD-10-CM

## 2024-05-07 DIAGNOSIS — E27.40 ADRENAL INSUFFICIENCY (MULTI): ICD-10-CM

## 2024-05-07 DIAGNOSIS — I48.0 PAF (PAROXYSMAL ATRIAL FIBRILLATION) (MULTI): ICD-10-CM

## 2024-05-07 LAB
ALBUMIN SERPL BCP-MCNC: 2.8 G/DL (ref 3.4–5)
ALP SERPL-CCNC: 133 U/L (ref 33–136)
ALT SERPL W P-5'-P-CCNC: 23 U/L (ref 7–45)
AMMONIA PLAS-SCNC: 19 UMOL/L (ref 16–53)
ANION GAP BLDA CALCULATED.4IONS-SCNC: 12 MMO/L (ref 10–25)
ANION GAP BLDV CALCULATED.4IONS-SCNC: 10 MMOL/L (ref 10–25)
ANION GAP SERPL CALC-SCNC: 9 MMOL/L (ref 10–20)
APAP SERPL-MCNC: <10 UG/ML
APPARATUS: ABNORMAL
ARTERIAL PATENCY WRIST A: POSITIVE
AST SERPL W P-5'-P-CCNC: 24 U/L (ref 9–39)
BASE EXCESS BLDA CALC-SCNC: -2.3 MMOL/L (ref -2–3)
BASE EXCESS BLDV CALC-SCNC: 0.7 MMOL/L (ref -2–3)
BASOPHILS # BLD MANUAL: 0 X10*3/UL (ref 0–0.1)
BASOPHILS NFR BLD MANUAL: 0 %
BILIRUB SERPL-MCNC: 0.3 MG/DL (ref 0–1.2)
BODY TEMPERATURE: ABNORMAL
BODY TEMPERATURE: ABNORMAL
BUN SERPL-MCNC: 29 MG/DL (ref 6–23)
CA-I BLDA-SCNC: 1.25 MMOL/L (ref 1.1–1.33)
CA-I BLDV-SCNC: 1.29 MMOL/L (ref 1.1–1.33)
CALCIUM SERPL-MCNC: 8.2 MG/DL (ref 8.6–10.3)
CARDIAC TROPONIN I PNL SERPL HS: 15 NG/L (ref 0–13)
CHLORIDE BLDA-SCNC: 112 MMOL/L (ref 98–107)
CHLORIDE BLDV-SCNC: 114 MMOL/L (ref 98–107)
CHLORIDE SERPL-SCNC: 115 MMOL/L (ref 98–107)
CO2 SERPL-SCNC: 26 MMOL/L (ref 21–32)
CREAT SERPL-MCNC: 1.53 MG/DL (ref 0.5–1.05)
CRITICAL CALL TIME: 1453
CRITICAL CALLED BY: ABNORMAL
CRITICAL CALLED TO: ABNORMAL
CRITICAL READ BACK: ABNORMAL
CRP SERPL-MCNC: 2.1 MG/DL
DACRYOCYTES BLD QL SMEAR: ABNORMAL
EGFRCR SERPLBLD CKD-EPI 2021: 38 ML/MIN/1.73M*2
EOSINOPHIL # BLD MANUAL: 0.03 X10*3/UL (ref 0–0.7)
EOSINOPHIL NFR BLD MANUAL: 1 %
ERYTHROCYTE [DISTWIDTH] IN BLOOD BY AUTOMATED COUNT: 19 % (ref 11.5–14.5)
ETHANOL SERPL-MCNC: <10 MG/DL
GLUCOSE BLD MANUAL STRIP-MCNC: 149 MG/DL (ref 74–99)
GLUCOSE BLD MANUAL STRIP-MCNC: 161 MG/DL (ref 74–99)
GLUCOSE BLD MANUAL STRIP-MCNC: 284 MG/DL (ref 74–99)
GLUCOSE BLDA-MCNC: 371 MG/DL (ref 74–99)
GLUCOSE BLDV-MCNC: 254 MG/DL (ref 74–99)
GLUCOSE SERPL-MCNC: 235 MG/DL (ref 74–99)
HCO3 BLDA-SCNC: 23.7 MMOL/L (ref 22–26)
HCO3 BLDV-SCNC: 27.4 MMOL/L (ref 22–26)
HCT VFR BLD AUTO: 26.3 % (ref 36–46)
HCT VFR BLD EST: 12 % (ref 36–46)
HCT VFR BLD EST: 24 % (ref 36–46)
HGB BLD-MCNC: 7.8 G/DL (ref 12–16)
HGB BLDA-MCNC: 8 G/DL (ref 12–16)
HGB BLDV-MCNC: 3.9 G/DL (ref 12–16)
HOLD SPECIMEN: NORMAL
HYPOCHROMIA BLD QL SMEAR: ABNORMAL
IMM GRANULOCYTES # BLD AUTO: 0.27 X10*3/UL (ref 0–0.7)
IMM GRANULOCYTES NFR BLD AUTO: 8.9 % (ref 0–0.9)
INHALED O2 CONCENTRATION: 0 %
INHALED O2 CONCENTRATION: 2 %
LACTATE BLDA-SCNC: 1.3 MMOL/L (ref 0.4–2)
LACTATE BLDV-SCNC: 2 MMOL/L (ref 0.4–2)
LACTATE SERPL-SCNC: 0.9 MMOL/L (ref 0.4–2)
LACTATE SERPL-SCNC: 2.1 MMOL/L (ref 0.4–2)
LACTATE SERPL-SCNC: 2.9 MMOL/L (ref 0.4–2)
LYMPHOCYTES # BLD MANUAL: 0.54 X10*3/UL (ref 1.2–4.8)
LYMPHOCYTES NFR BLD MANUAL: 18 %
MCH RBC QN AUTO: 29.4 PG (ref 26–34)
MCHC RBC AUTO-ENTMCNC: 29.7 G/DL (ref 32–36)
MCV RBC AUTO: 99 FL (ref 80–100)
METAMYELOCYTES # BLD MANUAL: 0.03 X10*3/UL
METAMYELOCYTES NFR BLD MANUAL: 1 %
MONOCYTES # BLD MANUAL: 0.3 X10*3/UL (ref 0.1–1)
MONOCYTES NFR BLD MANUAL: 10 %
NEUTROPHILS # BLD MANUAL: 2.1 X10*3/UL (ref 1.2–7.7)
NEUTS BAND # BLD MANUAL: 0.12 X10*3/UL (ref 0–0.7)
NEUTS BAND NFR BLD MANUAL: 4 %
NEUTS SEG # BLD MANUAL: 1.98 X10*3/UL (ref 1.2–7)
NEUTS SEG NFR BLD MANUAL: 66 %
NRBC BLD-RTO: 5.3 /100 WBCS (ref 0–0)
OVALOCYTES BLD QL SMEAR: ABNORMAL
OXYHGB MFR BLDA: 95.2 % (ref 94–98)
OXYHGB MFR BLDV: 40.3 % (ref 45–75)
PCO2 BLDA: 46 MM HG (ref 38–42)
PCO2 BLDV: 61 MM HG (ref 41–51)
PH BLDA: 7.32 PH (ref 7.38–7.42)
PH BLDV: 7.26 PH (ref 7.33–7.43)
PLATELET # BLD AUTO: 120 X10*3/UL (ref 150–450)
PO2 BLDA: 81 MM HG (ref 85–95)
PO2 BLDV: 26 MM HG (ref 35–45)
POCT GLUCOSE: 161 MG/DL (ref 74–99)
POTASSIUM BLDA-SCNC: 4.3 MMOL/L (ref 3.5–5.3)
POTASSIUM BLDV-SCNC: 4.1 MMOL/L (ref 3.5–5.3)
POTASSIUM SERPL-SCNC: 4 MMOL/L (ref 3.5–5.3)
PROT SERPL-MCNC: 5.3 G/DL (ref 6.4–8.2)
RBC # BLD AUTO: 2.65 X10*6/UL (ref 4–5.2)
RBC MORPH BLD: ABNORMAL
SALICYLATES SERPL-MCNC: <3 MG/DL
SAO2 % BLDA: 97 % (ref 94–100)
SAO2 % BLDV: 41 % (ref 45–75)
SCHISTOCYTES BLD QL SMEAR: ABNORMAL
SODIUM BLDA-SCNC: 143 MMOL/L (ref 136–145)
SODIUM BLDV-SCNC: 147 MMOL/L (ref 136–145)
SODIUM SERPL-SCNC: 146 MMOL/L (ref 136–145)
SPECIMEN DRAWN FROM PATIENT: ABNORMAL
TOTAL CELLS COUNTED BLD: 100
TSH SERPL-ACNC: 2.78 MIU/L (ref 0.44–3.98)
WBC # BLD AUTO: 3 X10*3/UL (ref 4.4–11.3)

## 2024-05-07 PROCEDURE — 36415 COLL VENOUS BLD VENIPUNCTURE: CPT | Performed by: EMERGENCY MEDICINE

## 2024-05-07 PROCEDURE — 84132 ASSAY OF SERUM POTASSIUM: CPT | Mod: 91 | Performed by: EMERGENCY MEDICINE

## 2024-05-07 PROCEDURE — 2500000004 HC RX 250 GENERAL PHARMACY W/ HCPCS (ALT 636 FOR OP/ED)

## 2024-05-07 PROCEDURE — 80202 ASSAY OF VANCOMYCIN: CPT

## 2024-05-07 PROCEDURE — 84443 ASSAY THYROID STIM HORMONE: CPT | Performed by: EMERGENCY MEDICINE

## 2024-05-07 PROCEDURE — 83605 ASSAY OF LACTIC ACID: CPT | Performed by: EMERGENCY MEDICINE

## 2024-05-07 PROCEDURE — 99291 CRITICAL CARE FIRST HOUR: CPT | Performed by: EMERGENCY MEDICINE

## 2024-05-07 PROCEDURE — 93005 ELECTROCARDIOGRAM TRACING: CPT

## 2024-05-07 PROCEDURE — 71045 X-RAY EXAM CHEST 1 VIEW: CPT

## 2024-05-07 PROCEDURE — 84145 PROCALCITONIN (PCT): CPT | Mod: ELYLAB | Performed by: EMERGENCY MEDICINE

## 2024-05-07 PROCEDURE — 80307 DRUG TEST PRSMV CHEM ANLYZR: CPT | Performed by: EMERGENCY MEDICINE

## 2024-05-07 PROCEDURE — 71045 X-RAY EXAM CHEST 1 VIEW: CPT | Performed by: RADIOLOGY

## 2024-05-07 PROCEDURE — 2500000002 HC RX 250 W HCPCS SELF ADMINISTERED DRUGS (ALT 637 FOR MEDICARE OP, ALT 636 FOR OP/ED): Performed by: EMERGENCY MEDICINE

## 2024-05-07 PROCEDURE — 87899 AGENT NOS ASSAY W/OPTIC: CPT | Mod: ELYLAB | Performed by: EMERGENCY MEDICINE

## 2024-05-07 PROCEDURE — 82947 ASSAY GLUCOSE BLOOD QUANT: CPT | Mod: 91

## 2024-05-07 PROCEDURE — 2500000004 HC RX 250 GENERAL PHARMACY W/ HCPCS (ALT 636 FOR OP/ED): Performed by: EMERGENCY MEDICINE

## 2024-05-07 PROCEDURE — 81001 URINALYSIS AUTO W/SCOPE: CPT | Performed by: EMERGENCY MEDICINE

## 2024-05-07 PROCEDURE — 94640 AIRWAY INHALATION TREATMENT: CPT

## 2024-05-07 PROCEDURE — 85007 BL SMEAR W/DIFF WBC COUNT: CPT | Performed by: EMERGENCY MEDICINE

## 2024-05-07 PROCEDURE — 96365 THER/PROPH/DIAG IV INF INIT: CPT

## 2024-05-07 PROCEDURE — 96375 TX/PRO/DX INJ NEW DRUG ADDON: CPT

## 2024-05-07 PROCEDURE — 82533 TOTAL CORTISOL: CPT | Mod: ELYLAB | Performed by: EMERGENCY MEDICINE

## 2024-05-07 PROCEDURE — 84484 ASSAY OF TROPONIN QUANT: CPT | Performed by: EMERGENCY MEDICINE

## 2024-05-07 PROCEDURE — 2020000001 HC ICU ROOM DAILY

## 2024-05-07 PROCEDURE — 2500000004 HC RX 250 GENERAL PHARMACY W/ HCPCS (ALT 636 FOR OP/ED): Performed by: HOSPITALIST

## 2024-05-07 PROCEDURE — 82140 ASSAY OF AMMONIA: CPT | Performed by: EMERGENCY MEDICINE

## 2024-05-07 PROCEDURE — 84132 ASSAY OF SERUM POTASSIUM: CPT | Mod: 91

## 2024-05-07 PROCEDURE — 70450 CT HEAD/BRAIN W/O DYE: CPT | Performed by: RADIOLOGY

## 2024-05-07 PROCEDURE — 87040 BLOOD CULTURE FOR BACTERIA: CPT | Mod: 91,ELYLAB | Performed by: EMERGENCY MEDICINE

## 2024-05-07 PROCEDURE — 36600 WITHDRAWAL OF ARTERIAL BLOOD: CPT

## 2024-05-07 PROCEDURE — 80143 DRUG ASSAY ACETAMINOPHEN: CPT | Performed by: EMERGENCY MEDICINE

## 2024-05-07 PROCEDURE — C9113 INJ PANTOPRAZOLE SODIUM, VIA: HCPCS | Performed by: EMERGENCY MEDICINE

## 2024-05-07 PROCEDURE — 87086 URINE CULTURE/COLONY COUNT: CPT | Mod: ELYLAB | Performed by: EMERGENCY MEDICINE

## 2024-05-07 PROCEDURE — 87641 MR-STAPH DNA AMP PROBE: CPT | Performed by: EMERGENCY MEDICINE

## 2024-05-07 PROCEDURE — 86140 C-REACTIVE PROTEIN: CPT | Performed by: EMERGENCY MEDICINE

## 2024-05-07 PROCEDURE — 85027 COMPLETE CBC AUTOMATED: CPT | Performed by: EMERGENCY MEDICINE

## 2024-05-07 PROCEDURE — 80177 DRUG SCRN QUAN LEVETIRACETAM: CPT | Mod: ELYLAB | Performed by: EMERGENCY MEDICINE

## 2024-05-07 PROCEDURE — 82947 ASSAY GLUCOSE BLOOD QUANT: CPT

## 2024-05-07 PROCEDURE — 87449 NOS EACH ORGANISM AG IA: CPT | Mod: ELYLAB | Performed by: EMERGENCY MEDICINE

## 2024-05-07 PROCEDURE — 70450 CT HEAD/BRAIN W/O DYE: CPT

## 2024-05-07 PROCEDURE — 84075 ASSAY ALKALINE PHOSPHATASE: CPT | Performed by: EMERGENCY MEDICINE

## 2024-05-07 RX ORDER — IPRATROPIUM BROMIDE AND ALBUTEROL SULFATE 2.5; .5 MG/3ML; MG/3ML
3 SOLUTION RESPIRATORY (INHALATION) EVERY 2 HOUR PRN
Status: DISCONTINUED | OUTPATIENT
Start: 2024-05-07 | End: 2024-05-18 | Stop reason: HOSPADM

## 2024-05-07 RX ORDER — DEXTROSE 50 % IN WATER (D50W) INTRAVENOUS SYRINGE
25
Status: DISCONTINUED | OUTPATIENT
Start: 2024-05-07 | End: 2024-05-18 | Stop reason: HOSPADM

## 2024-05-07 RX ORDER — ENOXAPARIN SODIUM 100 MG/ML
40 INJECTION SUBCUTANEOUS EVERY 24 HOURS
Status: DISCONTINUED | OUTPATIENT
Start: 2024-05-07 | End: 2024-05-08

## 2024-05-07 RX ORDER — CEFTRIAXONE 1 G/50ML
1 INJECTION, SOLUTION INTRAVENOUS ONCE
Status: COMPLETED | OUTPATIENT
Start: 2024-05-07 | End: 2024-05-07

## 2024-05-07 RX ORDER — MYCOPHENOLATE MOFETIL 500 MG/1
1000 TABLET ORAL 2 TIMES DAILY
Status: DISCONTINUED | OUTPATIENT
Start: 2024-05-07 | End: 2024-05-08

## 2024-05-07 RX ORDER — PANTOPRAZOLE SODIUM 40 MG/10ML
40 INJECTION, POWDER, LYOPHILIZED, FOR SOLUTION INTRAVENOUS DAILY
Status: DISCONTINUED | OUTPATIENT
Start: 2024-05-07 | End: 2024-05-13

## 2024-05-07 RX ORDER — ACETAMINOPHEN 500 MG
5 TABLET ORAL NIGHTLY
Status: DISCONTINUED | OUTPATIENT
Start: 2024-05-07 | End: 2024-05-18 | Stop reason: HOSPADM

## 2024-05-07 RX ORDER — DEXTROSE, SODIUM CHLORIDE, SODIUM LACTATE, POTASSIUM CHLORIDE, AND CALCIUM CHLORIDE 5; .6; .31; .03; .02 G/100ML; G/100ML; G/100ML; G/100ML; G/100ML
100 INJECTION, SOLUTION INTRAVENOUS CONTINUOUS
Status: DISCONTINUED | OUTPATIENT
Start: 2024-05-07 | End: 2024-05-07

## 2024-05-07 RX ORDER — IPRATROPIUM BROMIDE AND ALBUTEROL SULFATE 2.5; .5 MG/3ML; MG/3ML
3 SOLUTION RESPIRATORY (INHALATION)
Status: DISCONTINUED | OUTPATIENT
Start: 2024-05-07 | End: 2024-05-07

## 2024-05-07 RX ORDER — OLANZAPINE 5 MG/1
5 TABLET ORAL 2 TIMES DAILY
Status: DISCONTINUED | OUTPATIENT
Start: 2024-05-07 | End: 2024-05-12

## 2024-05-07 RX ORDER — SODIUM CHLORIDE, SODIUM LACTATE, POTASSIUM CHLORIDE, CALCIUM CHLORIDE 600; 310; 30; 20 MG/100ML; MG/100ML; MG/100ML; MG/100ML
50 INJECTION, SOLUTION INTRAVENOUS CONTINUOUS
Status: DISCONTINUED | OUTPATIENT
Start: 2024-05-07 | End: 2024-05-13

## 2024-05-07 RX ORDER — MULTIVIT-MIN/IRON FUM/FOLIC AC 7.5 MG-4
1 TABLET ORAL DAILY
Status: DISCONTINUED | OUTPATIENT
Start: 2024-05-07 | End: 2024-05-18 | Stop reason: HOSPADM

## 2024-05-07 RX ORDER — IPRATROPIUM BROMIDE AND ALBUTEROL SULFATE 2.5; .5 MG/3ML; MG/3ML
3 SOLUTION RESPIRATORY (INHALATION) 3 TIMES DAILY
Status: DISCONTINUED | OUTPATIENT
Start: 2024-05-08 | End: 2024-05-14

## 2024-05-07 RX ORDER — ATORVASTATIN CALCIUM 20 MG/1
40 TABLET, FILM COATED ORAL DAILY
Status: DISCONTINUED | OUTPATIENT
Start: 2024-05-07 | End: 2024-05-18 | Stop reason: HOSPADM

## 2024-05-07 RX ORDER — LEVETIRACETAM 5 MG/ML
500 INJECTION INTRAVASCULAR EVERY 12 HOURS
Status: DISCONTINUED | OUTPATIENT
Start: 2024-05-07 | End: 2024-05-08

## 2024-05-07 RX ORDER — DEXTROSE 50 % IN WATER (D50W) INTRAVENOUS SYRINGE
12.5
Status: DISCONTINUED | OUTPATIENT
Start: 2024-05-07 | End: 2024-05-18 | Stop reason: HOSPADM

## 2024-05-07 RX ORDER — ONDANSETRON HYDROCHLORIDE 2 MG/ML
4 INJECTION, SOLUTION INTRAVENOUS EVERY 6 HOURS PRN
Status: DISCONTINUED | OUTPATIENT
Start: 2024-05-07 | End: 2024-05-18 | Stop reason: HOSPADM

## 2024-05-07 RX ORDER — ACETAMINOPHEN 325 MG/1
650 TABLET ORAL EVERY 6 HOURS PRN
Status: DISCONTINUED | OUTPATIENT
Start: 2024-05-07 | End: 2024-05-12

## 2024-05-07 RX ORDER — FOLIC ACID 1 MG/1
1 TABLET ORAL DAILY
Status: DISCONTINUED | OUTPATIENT
Start: 2024-05-07 | End: 2024-05-18 | Stop reason: HOSPADM

## 2024-05-07 RX ORDER — VANCOMYCIN HYDROCHLORIDE 1 G/20ML
INJECTION, POWDER, LYOPHILIZED, FOR SOLUTION INTRAVENOUS DAILY PRN
Status: DISCONTINUED | OUTPATIENT
Start: 2024-05-07 | End: 2024-05-08

## 2024-05-07 RX ORDER — INSULIN LISPRO 100 [IU]/ML
0-10 INJECTION, SOLUTION INTRAVENOUS; SUBCUTANEOUS EVERY 4 HOURS
Status: DISCONTINUED | OUTPATIENT
Start: 2024-05-08 | End: 2024-05-09

## 2024-05-07 RX ORDER — LANOLIN ALCOHOL/MO/W.PET/CERES
100 CREAM (GRAM) TOPICAL DAILY
Status: DISCONTINUED | OUTPATIENT
Start: 2024-05-07 | End: 2024-05-18 | Stop reason: HOSPADM

## 2024-05-07 RX ORDER — IPRATROPIUM BROMIDE AND ALBUTEROL SULFATE 2.5; .5 MG/3ML; MG/3ML
3 SOLUTION RESPIRATORY (INHALATION) ONCE
Status: COMPLETED | OUTPATIENT
Start: 2024-05-07 | End: 2024-05-07

## 2024-05-07 RX ADMIN — IPRATROPIUM BROMIDE AND ALBUTEROL SULFATE 3 ML: 2.5; .5 SOLUTION RESPIRATORY (INHALATION) at 20:55

## 2024-05-07 RX ADMIN — ENOXAPARIN SODIUM 40 MG: 40 INJECTION SUBCUTANEOUS at 21:14

## 2024-05-07 RX ADMIN — PANTOPRAZOLE SODIUM 40 MG: 40 INJECTION, POWDER, FOR SOLUTION INTRAVENOUS at 18:41

## 2024-05-07 RX ADMIN — SODIUM CHLORIDE, SODIUM LACTATE, POTASSIUM CHLORIDE, CALCIUM CHLORIDE AND DEXTROSE MONOHYDRATE 100 ML/HR: 5; 600; 310; 30; 20 INJECTION, SOLUTION INTRAVENOUS at 21:14

## 2024-05-07 RX ADMIN — IPRATROPIUM BROMIDE AND ALBUTEROL SULFATE 3 ML: 2.5; .5 SOLUTION RESPIRATORY (INHALATION) at 15:43

## 2024-05-07 RX ADMIN — AZITHROMYCIN MONOHYDRATE 500 MG: 500 INJECTION, POWDER, LYOPHILIZED, FOR SOLUTION INTRAVENOUS at 22:37

## 2024-05-07 RX ADMIN — HYDROCORTISONE SODIUM SUCCINATE 100 MG: 100 INJECTION, POWDER, FOR SOLUTION INTRAMUSCULAR; INTRAVENOUS at 16:03

## 2024-05-07 RX ADMIN — SODIUM CHLORIDE, POTASSIUM CHLORIDE, SODIUM LACTATE AND CALCIUM CHLORIDE 100 ML/HR: 600; 310; 30; 20 INJECTION, SOLUTION INTRAVENOUS at 23:02

## 2024-05-07 RX ADMIN — VANCOMYCIN HYDROCHLORIDE 1250 MG: 1.25 INJECTION, POWDER, LYOPHILIZED, FOR SOLUTION INTRAVENOUS at 21:14

## 2024-05-07 RX ADMIN — LEVETIRACETAM 500 MG: 5 INJECTION INTRAVENOUS at 21:08

## 2024-05-07 RX ADMIN — INSULIN LISPRO 8 UNITS: 100 INJECTION, SOLUTION INTRAVENOUS; SUBCUTANEOUS at 23:33

## 2024-05-07 RX ADMIN — CEFEPIME 1 G: 1 INJECTION, POWDER, FOR SOLUTION INTRAMUSCULAR; INTRAVENOUS at 22:38

## 2024-05-07 RX ADMIN — HYDROCORTISONE SODIUM SUCCINATE 100 MG: 100 INJECTION, POWDER, FOR SOLUTION INTRAMUSCULAR; INTRAVENOUS at 23:14

## 2024-05-07 RX ADMIN — CEFTRIAXONE SODIUM 1 G: 1 INJECTION, SOLUTION INTRAVENOUS at 16:36

## 2024-05-07 ASSESSMENT — LIFESTYLE VARIABLES
HAVE PEOPLE ANNOYED YOU BY CRITICIZING YOUR DRINKING: NO
EVER HAD A DRINK FIRST THING IN THE MORNING TO STEADY YOUR NERVES TO GET RID OF A HANGOVER: NO
HAVE YOU EVER FELT YOU SHOULD CUT DOWN ON YOUR DRINKING: NO
TOTAL SCORE: 0
EVER FELT BAD OR GUILTY ABOUT YOUR DRINKING: NO

## 2024-05-07 ASSESSMENT — PAIN - FUNCTIONAL ASSESSMENT: PAIN_FUNCTIONAL_ASSESSMENT: 0-10

## 2024-05-07 ASSESSMENT — PAIN SCALES - GENERAL: PAINLEVEL_OUTOF10: 0 - NO PAIN

## 2024-05-07 NOTE — ED PROVIDER NOTES
HPI   Chief Complaint   Patient presents with    Altered Mental Status       At this time patient is 69-year-old female with history of DM  rheumatoid arthritis lupus kidney disease atrial fibrillation comes in by EMS for change in mental status.  Story and is EMS who states that they were told by the nurse that patient is more lethargic than usual her blood sugar was low they gave her some glucagon and then from 40 to 65.  At this time EMS was called and they checked her sugar it was 125 patient unable to give much history but was brought here for evaluation.                          Nasim Coma Scale Score: 12                     Patient History   Past Medical History:   Diagnosis Date    Abnormal kidney function 04/04/2023    Acute headache 03/10/2024    Acute low back pain 10/30/2023    Acute upper respiratory infection, unspecified 03/04/2020    Acute URI    Acute upper respiratory infection, unspecified 09/30/2015    URTI (acute upper respiratory infection)    Arthritis     Atypical facial pain 03/10/2024    Body mass index (BMI) 23.0-23.9, adult 10/15/2021    BMI 23.0-23.9, adult    Body mass index (BMI) 33.0-33.9, adult 03/04/2020    BMI 33.0-33.9,adult    Cardiomegaly 08/27/2013    Left ventricular hypertrophy    Chest pain 06/10/2022    Comment on above: Added by Problem List Migration; 2013-3-12; Moved to Suppressed Nov 25 2013 9:16PM; Comment on above: Added by Problem List Migration; 2013-3-12; Moved to Suppressed Nov 25 2013 9:16PM;    Chronic kidney disease, stage 3 unspecified (Multi) 07/02/2013    Chronic kidney disease, stage III (moderate)    Confusional state 03/10/2024    Contact with and (suspected) exposure to covid-19 04/04/2023    Delirium 03/10/2024    Disease of pericardium, unspecified ([facility]) 07/02/2013    Pericardial disease    Encounter for follow-up examination after completed treatment for conditions other than malignant neoplasm 10/06/2022    Hospital discharge follow-up     Generalized contraction of visual field, right eye 01/29/2015    Generalized contraction of visual field of right eye    Hearing loss 03/10/2024    History of cataract 03/10/2024    History of thrombocytopenia 03/10/2024    Comment on above: Added by Problem List Migration; 2013-7-2;    Homonymous bilateral field defects, right side 04/29/2016    Homonymous bilateral field defects of right side    Hypertensive chronic kidney disease with stage 1 through stage 4 chronic kidney disease, or unspecified chronic kidney disease 07/02/2013    Nephrosclerosis    Laceration without foreign body, left foot, initial encounter 07/03/2018    Foot laceration, left, initial encounter    Localized edema 03/10/2024    Mass of skin 03/10/2024    Migraine with aura, not intractable, without status migrainosus 10/24/2022    Ocular migraine    Open wound 03/10/2024    Open wound of left foot 03/10/2024    Other conditions influencing health status 07/02/2013    Chronic Glomerulonephritis In Diseases Classified Elsewhere    Other conditions influencing health status 07/02/2013    Progressive Familial Myoclonic Epilepsy    Other conditions influencing health status 07/02/2013    Protein S Deficiency    Other conditions influencing health status 05/22/2015    Familial Combined Hyperlipidemia    Other conditions influencing health status 10/24/2022    IDDM (insulin dependent diabetes mellitus)    Other conditions influencing health status 03/14/2022    Diabetes mellitus, insulin dependent (IDDM), uncontrolled    Other long term (current) drug therapy 10/24/2022    Long-term use of Plaquenil    Overweight with body mass index (BMI) 25.0-29.9 03/10/2024    Pain of toe 03/10/2024    Personal history of COVID-19 04/04/2023    Personal history of diseases of the blood and blood-forming organs and certain disorders involving the immune mechanism 07/02/2013    History of thrombocytopenia    Personal history of diseases of the skin and subcutaneous  tissue 08/11/2015    History of foot ulcer    Personal history of nephrotic syndrome 07/02/2013    History of nephrotic syndrome    Personal history of other diseases of the circulatory system 08/27/2013    History of sinus tachycardia    Personal history of other diseases of the nervous system and sense organs     History of cataract    Personal history of other diseases of the respiratory system     History of bronchitis    Personal history of other infectious and parasitic diseases 07/02/2013    History of hepatitis    Personal history of other specified conditions     History of shortness of breath    Personal history of other specified conditions 08/27/2013    History of edema    Posterior epistaxis 03/10/2024    Puckering of macula, right eye 10/24/2022    ERM OD (epiretinal membrane, right eye)    Raynaud's syndrome without gangrene 07/02/2013    Raynaud's disease    Sinusitis 03/10/2024    Systemic lupus erythematosus, unspecified (Multi) 07/24/2015    SLE (systemic lupus erythematosus)    Systemic lupus erythematosus, unspecified (Multi) 07/24/2015    SLE (systemic lupus erythematosus)    Systemic lupus erythematosus, unspecified (Multi) 07/24/2015    Systemic lupus    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) 07/02/2013    Type 2 diabetes with nephropathy    Type 2 diabetes mellitus with mild nonproliferative diabetic retinopathy without macular edema, left eye (Multi) 07/27/2015    Non-proliferative diabetic retinopathy, left eye    Type 2 diabetes mellitus with mild nonproliferative diabetic retinopathy without macular edema, unspecified eye (Multi) 07/24/2015    Mild non proliferative diabetic retinopathy    Type 2 diabetes mellitus with proliferative diabetic retinopathy without macular edema, right eye (Multi) 07/27/2015    Proliferative diabetic retinopathy of right eye    Type 2 diabetes mellitus with proliferative diabetic retinopathy without macular edema, unspecified eye (Multi)  07/24/2015    Diabetic proliferative retinopathy    Unspecified acute and subacute iridocyclitis 07/24/2015    Acute iritis, right eye    Unspecified open wound, left foot, sequela 07/03/2018    Wound, open, foot, left, sequela     Past Surgical History:   Procedure Laterality Date    ANKLE SURGERY  01/29/2015    Ankle Surgery    CHOLECYSTECTOMY  01/29/2015    Cholecystectomy    CT GUIDED PERCUTANEOUS BIOPSY BONE DEEP  5/4/2021    CT GUIDED PERCUTANEOUS BIOPSY BONE DEEP 5/4/2021 [facility] CLINICAL LEGACY    EYE SURGERY  03/06/2015    Eye Surgery    FOOT SURGERY  01/29/2015    Foot Surgery    MR HEAD ANGIO WO IV CONTRAST  7/26/2013    MR HEAD ANGIO WO IV CONTRAST 7/26/2013 [facility] CLINICAL LEGACY    MR HEAD ANGIO WO IV CONTRAST  9/17/2021    MR HEAD ANGIO WO IV CONTRAST 9/17/2021 [facility]    MR HEAD ANGIO WO IV CONTRAST  3/25/2023    MR HEAD ANGIO WO IV CONTRAST [facility]    MR NECK ANGIO WO IV CONTRAST  7/26/2013    MR NECK ANGIO WO IV CONTRAST 7/26/2013 [facility] CLINICAL LEGACY    MR NECK ANGIO WO IV CONTRAST  9/17/2021    MR NECK ANGIO WO IV CONTRAST 9/17/2021 [facility]    MR NECK ANGIO WO IV CONTRAST  3/25/2023    MR NECK ANGIO WO IV CONTRAST [facility]    OTHER SURGICAL HISTORY  01/29/2015    Creation Of Pericardial Window    OTHER SURGICAL HISTORY  01/29/2015    Quadricepsplasty    TOTAL HIP ARTHROPLASTY  01/29/2015    Hip Replacement     Family History   Problem Relation Name Age of Onset    Other (RENAL DISEASE) Mother          END STAGE    Other (CARDIAC DISORDER) Mother      Cataracts Mother      Stroke Mother      Diabetes Mother      Kidney disease Mother      Lupus Mother      Other (CARDIAC DISORDER) Father      COPD Father      Glaucoma Father      Hypertension Father      Sleep apnea Father      Other (CARDIAC DISORDER) Sister      Depression Sister      Kidney disease Sister      Sickle cell trait Sister      Sleep apnea Daughter       Social History     Tobacco Use    Smoking status: Never      Passive exposure: Never    Smokeless tobacco: Never   Vaping Use    Vaping status: Never Used   Substance Use Topics    Alcohol use: Not Currently     Comment: RARE    Drug use: Never       Physical Exam   ED Triage Vitals   Temp Pulse Resp BP   -- -- -- --      SpO2 Temp src Heart Rate Source Patient Position   -- -- -- --      BP Location FiO2 (%)     -- --       Physical Exam  Vitals and nursing note reviewed.   Constitutional:       Appearance: She is ill-appearing.   Eyes:      Extraocular Movements: Extraocular movements intact.      Pupils: Pupils are equal, round, and reactive to light.   Cardiovascular:      Rate and Rhythm: Normal rate.   Pulmonary:      Effort: Pulmonary effort is normal.      Breath sounds: Normal breath sounds.   Neurological:      Mental Status: She is alert. She is disoriented.         ED Course & MDM   Diagnoses as of 05/07/24 1657   Disorientation   Acute pneumonia   Acute exacerbation of chronic obstructive pulmonary disease (COPD) (Multi)   Hypothermia, initial encounter       Medical Decision Making  EKG interpreted by me shows normal sinus rhythm with a rate of 65 normal QRS nonspecific ST changes  As per EMS nobody was sure what the patient's baseline, I reviewed old records patient is paraplegic is admitted arthritic diabetes and lupus.  At this time I ordered a septic workup in the form of CBC chemistries urinalysis serum lactate blood cultures serum ammonia levels and further workup and management were done based upon the results of the test obtained  Labs Reviewed  CBC WITH AUTO DIFFERENTIAL - Abnormal     WBC                           3.0 (*)                nRBC                          5.3 (*)                RBC                           2.65 (*)               Hemoglobin                    7.8 (*)                Hematocrit                    26.3 (*)               MCV                           99                     MCH                           29.4                    MCHC                          29.7 (*)               RDW                           19.0 (*)               Platelets                     120 (*)                Immature Granulocytes %, Automated   8.9 (*)                Immature Granulocytes Absolute, Au*   0.27                     Narrative: The previously reported component Neutrophils % is no longer being reported.The previously reported component Lymphocytes % is no longer being reported.The previously reported component Monocytes % is no longer being reported.The previously                 reported component Eosinophils % is no longer being reported.The previously reported component Basophils % is no longer being reported.The previously reported component Absolute Neutrophils is no longer being reported.The previously reported                 component Absolute Lymphocytes is no longer being reported.The previously reported component Absolute Monocytes is no longer being reported.The previously reported component Absolute Eosinophils is no longer being reported.The previously reported                 component Absolute Basophils is no longer being reported.  COMPREHENSIVE METABOLIC PANEL - Abnormal     Glucose                       235 (*)                Sodium                        146 (*)                Potassium                     4.0                    Chloride                      115 (*)                Bicarbonate                   26                     Anion Gap                     9 (*)                  Urea Nitrogen                 29 (*)                 Creatinine                    1.53 (*)               eGFR                          38 (*)                 Calcium                       8.2 (*)                Albumin                       2.8 (*)                Alkaline Phosphatase          133                    Total Protein                 5.3 (*)                AST                           24                     Bilirubin, Total               0.3                    ALT                           23                  LACTATE - Abnormal     Lactate                       2.1 (*)                  Narrative: Venipuncture immediately after or during the administration of Metamizole may lead to falsely low results. Testing should be performed immediately                prior to Metamizole dosing.  TROPONIN I, HIGH SENSITIVITY - Abnormal     Troponin I, High Sensitivity   15 (*)                   Narrative: Less than 99th percentile of normal range cutoff-                Female and children under 18 years old <14 ng/L; Male <21 ng/L: Negative                Repeat testing should be performed if clinically indicated.                                 Female and children under 18 years old 14-50 ng/L; Male 21-50 ng/L:                Consistent with possible cardiac damage and possible increased clinical                 risk. Serial measurements may help to assess extent of myocardial damage.                                 >50 ng/L: Consistent with cardiac damage, increased clinical risk and                myocardial infarction. Serial measurements may help assess extent of                 myocardial damage.                                  NOTE: Children less than 1 year old may have higher baseline troponin                 levels and results should be interpreted in conjunction with the overall                 clinical context.                                 NOTE: Troponin I testing is performed using a different                 testing methodology at [facility] than at other                 [facility]. Direct result comparisons should only                 be made within the same method.  BLOOD GAS VENOUS FULL PANEL - Abnormal     POCT pH, Venous               7.26 (*)               POCT pCO2, Venous             61 (*)                 POCT pO2, Venous              26 (*)                 POCT SO2, Venous              41 (*)                 POCT  Oxy Hemoglobin, Venous   40.3 (*)               POCT Hematocrit Calculated, Venous   12.0 (*)               POCT Sodium, Venous           147 (*)                POCT Potassium, Venous        4.1                    POCT Chloride, Venous         114 (*)                POCT Ionized Calicum, Venous   1.29                   POCT Glucose, Venous          254 (*)                POCT Lactate, Venous          2.0                    POCT Base Excess, Venous      0.7                    POCT HCO3 Calculated, Venous   27.4 (*)               POCT Hemoglobin, Venous       3.9 (*)                POCT Anion Gap, Venous        10.0                   Patient Temperature                                  FiO2                          0                      Critical Called By            CRENANI                  Critical Called To            AMINAH MENDEZ                Critical Call Time            1453                   Critical Read Back            Y                   POCT GLUCOSE - Abnormal     POCT Glucose                  161 (*)             POCT GLUCOSE - Abnormal     POCT Glucose                  149 (*)             POCT GLUCOSE METER - Abnormal     POCT Glucose                  161 (*)             MANUAL DIFFERENTIAL - Abnormal     Neutrophils %, Manual         66.0                   Bands %, Manual               4.0                    Lymphocytes %, Manual         18.0                   Monocytes %, Manual           10.0                   Eosinophils %, Manual         1.0                    Basophils %, Manual           0.0                    Metamyelocytes %, Manual      1.0                    Seg Neutrophils Absolute, Manual   1.98                   Bands Absolute, Manual        0.12                   Lymphocytes Absolute, Manual   0.54 (*)               Monocytes Absolute, Manual    0.30                   Eosinophils Absolute, Manual   0.03                   Basophils Absolute, Manual    0.00                   Metamyelocytes  Absolute, Manual   0.03                   Total Cells Counted           100                    Neutrophils Absolute, Manual   2.10                   RBC Morphology                See Below                Hypochromia                   Mild                   RBC Fragments                 Few                    Ovalocytes                    Few                    Teardrop Cells                Few                 AMMONIA - Normal     Ammonia                       19                  BLOOD CULTURE  BLOOD CULTURE  URINALYSIS WITH REFLEX CULTURE AND MICROSCOPIC       Narrative: The following orders were created for panel order Urinalysis with Reflex Culture and Microscopic.                Procedure                               Abnormality         Status                                   ---------                               -----------         ------                                   Urinalysis with Reflex C...[[id number]]                                                               Extra Urine Gray Tube[[id number]]                                                                                     Please view results for these tests on the individual orders.  URINALYSIS WITH REFLEX CULTURE AND MICROSCOPIC  EXTRA URINE GRAY TUBE  LACTATE  DRUG SCREEN,URINE  ACUTE TOXICOLOGY PANEL, BLOOD     XR chest 1 view   Final Result    Diffuse interstitial infiltrates bilaterally, as above. Clinical    correlation and continued follow-up until clearing is recommended.          MACRO:    None.          Signed by: Nic Moseley 5/7/2024 2:07 PM    Dictation workstation:   [id number]    At this time patient was evaluated was found to be hypothermic, I reached out to the ICU team I also ordered BiPAP for the patient after discussion with ICU team and was appropriate and did not want me to order any more antibiotics patient was physically evaluated by ICU team and accepted service.          Procedure  Critical Care    Performed by:  Michael Hines MD  Authorized by: Michael Hines MD    Critical care provider statement:     Critical care time (minutes):  35    Critical care time was exclusive of:  Separately billable procedures and treating other patients    Critical care was necessary to treat or prevent imminent or life-threatening deterioration of the following conditions:  Endocrine crisis and metabolic crisis    Critical care was time spent personally by me on the following activities:  Discussions with consultants, development of treatment plan with patient or surrogate, ordering and performing treatments and interventions, ordering and review of laboratory studies, ordering and review of radiographic studies, re-evaluation of patient's condition, review of old charts, examination of patient and obtaining history from patient or surrogate       Michael Hines MD  05/07/24 5411

## 2024-05-07 NOTE — H&P
[facility] Critical Care Medicine       Date:  5/7/2024  Patient:  Bing Holliday  YOB: 1960  MRN:  [id number]   Admit Date:  5/7/2024  ========================================================================================================    Chief Complaint   Patient presents with    Altered Mental Status         History of Present Illness:  Bing Holliday is a 63 y.o. 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  History somewhat limited as the patient, while she awakens and is able to answer simple questions, is unable to offer any meaningful history.    Extensive chart review undertaken.  Patient with multiple presentations of the same.  Admitted from 1/17 - 1-28 to [facility] (formerly [facility]) for similar presentation.  Underwent extensive workup there at that time, was treated with stress to steroids, rheumatology consultation who do not believe that she was in acute lupus flare, endocrinology consultation, as well as neurology consultation.  During that admission there was concern for potential seizure-like activity, the patient underwent a video EEG, was found to be in status, and was loaded with Keppra and continued on maintenance dosing.  She ultimately was discharged to skilled nursing facility for ongoing care.  At the time of discharge her noted prednisone dosing was 15 mg daily.    She was then readmitted to the hospital 3/9 through 3/14 with hypothermia, hyponatremia, altered mental status, and hypoglycemia.  She again underwent an extensive workup, was seen by endocrinology, manage on stress dose steroids, no infectious etiology elucidated  despite a very exhaustive search, she improved, and at that time she was discharged on what is documented as 20 mg of prednisone daily.    She then was readmitted to the hospital 3/19 through 3/26 with almost identical presentation of hypothermia, hyponatremia, hypoglycemia.  She was treated with stress dose steroids, underwent an MRI of the brain which was unrevealing, she returned to her baseline, and was discharged back to her skilled nursing facility.  At that time she was discharged home and is reported to be 20 mg of prednisone daily.    She then was readmitted to the hospital 4/24 to 4/30 for exactly the same presentation including hypoglycemia, hallucinations, hypothermia, and altered mental status.  And was treated in a very similar fashion including stress dose steroids.  She underwent an infectious workup, was treated for community-acquired pneumonia, and ultimately improved.  She was discharged with what was reported to be 20 mg of prednisone daily.    She presents today with hyperglycemia.  She received glucagon prior to arrival.  In the emergency department she was found to be leukopenic, hyponatremic, with a hemoglobin of 7.8 which is near her baseline hemoglobin.  She was given 1 g of ceftriaxone, 100 mg of hydrocortisone, and placed on a Allan hugger.  She was referred to the ICU for admission given her multiple medical comorbidities.      I was able to discuss case with the patient's skilled nursing facility, they note that she is getting 15 mg of prednisone daily. The recent discharge summary is from her prior admissions, the patient was to be discharged on 20 mg of prednisone as this is a dose that she has been well on.        Interval ICU Events:    5/7: Patient mated to the ICU.  Seen and examined in the emergency department.  Somnolent but arouses to voice and follows simple commands.  Is without complaint.  Attempted to reach out to the patient's daughter who did not answer the  phone.    Medical History:  Past Medical History:   Diagnosis Date    Abnormal kidney function 04/04/2023    Acute headache 03/10/2024    Acute low back pain 10/30/2023    Acute upper respiratory infection, unspecified 03/04/2020    Acute URI    Acute upper respiratory infection, unspecified 09/30/2015    URTI (acute upper respiratory infection)    Arthritis     Atypical facial pain 03/10/2024    Body mass index (BMI) 23.0-23.9, adult 10/15/2021    BMI 23.0-23.9, adult    Body mass index (BMI) 33.0-33.9, adult 03/04/2020    BMI 33.0-33.9,adult    Cardiomegaly 08/27/2013    Left ventricular hypertrophy    Chest pain 06/10/2022    Comment on above: Added by Problem List Migration; 2013-3-12; Moved to Suppressed Nov 25 2013 9:16PM; Comment on above: Added by Problem List Migration; 2013-3-12; Moved to Suppressed Nov 25 2013 9:16PM;    Chronic kidney disease, stage 3 unspecified (Multi) 07/02/2013    Chronic kidney disease, stage III (moderate)    Confusional state 03/10/2024    Contact with and (suspected) exposure to covid-19 04/04/2023    Delirium 03/10/2024    Disease of pericardium, unspecified ([facility]) 07/02/2013    Pericardial disease    Encounter for follow-up examination after completed treatment for conditions other than malignant neoplasm 10/06/2022    Hospital discharge follow-up    Generalized contraction of visual field, right eye 01/29/2015    Generalized contraction of visual field of right eye    Hearing loss 03/10/2024    History of cataract 03/10/2024    History of thrombocytopenia 03/10/2024    Comment on above: Added by Problem List Migration; 2013-7-2;    Homonymous bilateral field defects, right side 04/29/2016    Homonymous bilateral field defects of right side    Hypertensive chronic kidney disease with stage 1 through stage 4 chronic kidney disease, or unspecified chronic kidney disease 07/02/2013    Nephrosclerosis    Laceration without foreign body, left foot, initial encounter 07/03/2018     Foot laceration, left, initial encounter    Localized edema 03/10/2024    Mass of skin 03/10/2024    Migraine with aura, not intractable, without status migrainosus 10/24/2022    Ocular migraine    Open wound 03/10/2024    Open wound of left foot 03/10/2024    Other conditions influencing health status 07/02/2013    Chronic Glomerulonephritis In Diseases Classified Elsewhere    Other conditions influencing health status 07/02/2013    Progressive Familial Myoclonic Epilepsy    Other conditions influencing health status 07/02/2013    Protein S Deficiency    Other conditions influencing health status 05/22/2015    Familial Combined Hyperlipidemia    Other conditions influencing health status 10/24/2022    IDDM (insulin dependent diabetes mellitus)    Other conditions influencing health status 03/14/2022    Diabetes mellitus, insulin dependent (IDDM), uncontrolled    Other long term (current) drug therapy 10/24/2022    Long-term use of Plaquenil    Overweight with body mass index (BMI) 25.0-29.9 03/10/2024    Pain of toe 03/10/2024    Personal history of COVID-19 04/04/2023    Personal history of diseases of the blood and blood-forming organs and certain disorders involving the immune mechanism 07/02/2013    History of thrombocytopenia    Personal history of diseases of the skin and subcutaneous tissue 08/11/2015    History of foot ulcer    Personal history of nephrotic syndrome 07/02/2013    History of nephrotic syndrome    Personal history of other diseases of the circulatory system 08/27/2013    History of sinus tachycardia    Personal history of other diseases of the nervous system and sense organs     History of cataract    Personal history of other diseases of the respiratory system     History of bronchitis    Personal history of other infectious and parasitic diseases 07/02/2013    History of hepatitis    Personal history of other specified conditions     History of shortness of breath    Personal history of  other specified conditions 08/27/2013    History of edema    Posterior epistaxis 03/10/2024    Puckering of macula, right eye 10/24/2022    ERM OD (epiretinal membrane, right eye)    Raynaud's syndrome without gangrene 07/02/2013    Raynaud's disease    Sinusitis 03/10/2024    Systemic lupus erythematosus, unspecified (Multi) 07/24/2015    SLE (systemic lupus erythematosus)    Systemic lupus erythematosus, unspecified (Multi) 07/24/2015    SLE (systemic lupus erythematosus)    Systemic lupus erythematosus, unspecified (Multi) 07/24/2015    Systemic lupus    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) 07/02/2013    Type 2 diabetes with nephropathy    Type 2 diabetes mellitus with mild nonproliferative diabetic retinopathy without macular edema, left eye (Multi) 07/27/2015    Non-proliferative diabetic retinopathy, left eye    Type 2 diabetes mellitus with mild nonproliferative diabetic retinopathy without macular edema, unspecified eye (Multi) 07/24/2015    Mild non proliferative diabetic retinopathy    Type 2 diabetes mellitus with proliferative diabetic retinopathy without macular edema, right eye (Multi) 07/27/2015    Proliferative diabetic retinopathy of right eye    Type 2 diabetes mellitus with proliferative diabetic retinopathy without macular edema, unspecified eye (Multi) 07/24/2015    Diabetic proliferative retinopathy    Unspecified acute and subacute iridocyclitis 07/24/2015    Acute iritis, right eye    Unspecified open wound, left foot, sequela 07/03/2018    Wound, open, foot, left, sequela     Past Surgical History:   Procedure Laterality Date    ANKLE SURGERY  01/29/2015    Ankle Surgery    CHOLECYSTECTOMY  01/29/2015    Cholecystectomy    CT GUIDED PERCUTANEOUS BIOPSY BONE DEEP  5/4/2021    CT GUIDED PERCUTANEOUS BIOPSY BONE DEEP 5/4/2021 [facility] CLINICAL LEGACY    EYE SURGERY  03/06/2015    Eye Surgery    FOOT SURGERY  01/29/2015    Foot Surgery    MR HEAD ANGIO WO IV CONTRAST  7/26/2013  "   MR HEAD ANGIO WO IV CONTRAST 7/26/2013 [facility] CLINICAL LEGACY    MR HEAD ANGIO WO IV CONTRAST  9/17/2021    MR HEAD ANGIO WO IV CONTRAST 9/17/2021 [facility]GAC[facility]    MR HEAD ANGIO WO IV CONTRAST  3/25/2023    MR HEAD ANGIO WO IV CONTRAST [facility]    MR NECK ANGIO WO IV CONTRAST  7/26/2013    MR NECK ANGIO WO IV CONTRAST 7/26/2013 [facility] CLINICAL LEGACY    MR NECK ANGIO WO IV CONTRAST  9/17/2021    MR NECK ANGIO WO IV CONTRAST 9/17/2021 [facility]GAC[facility]    MR NECK ANGIO WO IV CONTRAST  3/25/2023    MR NECK ANGIO WO IV CONTRAST [facility]    OTHER SURGICAL HISTORY  01/29/2015    Creation Of Pericardial Window    OTHER SURGICAL HISTORY  01/29/2015    Quadricepsplasty    TOTAL HIP ARTHROPLASTY  01/29/2015    Hip Replacement     (Not in a hospital admission)    Ace inhibitors, Hydroxychloroquine, Lisinopril, Penicillins, and Sulfa (sulfonamide antibiotics)  Social History     Tobacco Use    Smoking status: Never     Passive exposure: Never    Smokeless tobacco: Never   Vaping Use    Vaping status: Never Used   Substance Use Topics    Alcohol use: Not Currently     Comment: RARE    Drug use: Never     Family History   Problem Relation Name Age of Onset    Other (RENAL DISEASE) Mother          END STAGE    Other (CARDIAC DISORDER) Mother      Cataracts Mother      Stroke Mother      Diabetes Mother      Kidney disease Mother      Lupus Mother      Other (CARDIAC DISORDER) Father      COPD Father      Glaucoma Father      Hypertension Father      Sleep apnea Father      Other (CARDIAC DISORDER) Sister      Depression Sister      Kidney disease Sister      Sickle cell trait Sister      Sleep apnea Daughter         Review of Systems:  14 point review of systems was completed and negative except for those specially mention in my HPI    Physical Exam:    Heart Rate:  [62-63]   Temperature:  [36.9 °C (98.4 °F)]   Respirations:  [16-19]   BP: (127-137)/(65-80)   Height:  [167.6 cm (5' 6\")]   Weight:  [89.4 kg (197 lb)] "   Pulse Ox:  [92 %-98 %]     Physical Exam  Vitals and nursing note reviewed.   Constitutional:       Appearance: She is ill-appearing.      Comments: Somnolent    HENT:      Head: Normocephalic and atraumatic.      Nose: Nose normal.      Mouth/Throat:      Mouth: Mucous membranes are dry.   Eyes:      Extraocular Movements: Extraocular movements intact.      Pupils: Pupils are equal, round, and reactive to light.   Cardiovascular:      Rate and Rhythm: Normal rate and regular rhythm.   Pulmonary:      Effort: Pulmonary effort is normal. No respiratory distress.      Breath sounds: Normal breath sounds.   Abdominal:      General: Abdomen is flat. There is no distension.      Tenderness: There is no abdominal tenderness.   Musculoskeletal:         General: Tenderness (Bilateral LE) present. Normal range of motion.      Cervical back: Normal range of motion and neck supple.      Right lower leg: Edema present.      Left lower leg: Edema present.   Skin:     General: Skin is dry.      Comments: Cool to touch   Neurological:      General: No focal deficit present.      Comments: Disoriented         Objective:    I have reviewed all medications, laboratory results, and imaging pertinent for today's encounter    Assessment/Plan:    I am currently managing this critically ill patient for the following problems:    Acute encephalopathy: Multiple presentations of the same, believed to be due to adrenal insufficiency.  Rule out septic encephalopathy  History of lupus cerebritis  History of seizure (believed to be focal seizure in nature based on chart review)  Acute hypercapnic respiratory insufficiency with concomitant respiratory acidosis  History of coronary artery disease  History of atrial fibrillation on Eliquis  Hx of heart failure with reduced EF not in acute exacerbation   History of GERD  Recurrent hypoglycemic episodes believed to be due to adrenal insufficiency - possibly due to med rec error.   History of type 2  diabetes  History of adrenal insufficiency  Chronic anemia  History of lupus  Immune suppressed    Neuro/Psych/Pain Ctrl/Sedation:  Tylenol Advil for pain control  Hold Zyprexa in the setting Alterman status  Continue Keppra given her history of seizure, check level  Obtain video EEG  Consider neurology consult in the future if mental status not improved, EEG abnormal  Multiple MRI/CT scans have been undertaken in the past without new pathology present  LP performed in the past for similar presentation without acute pathology, will consider repeating if she does not improve with current therapies    Respiratory/ENT:  Maintain SpO2 greater than 90%, currently on 2 L nasal cannula  Trial of noninvasive positive pressure ventilation for acute acidosis  DuoNebs Q6  Home inhalers      Cardiovascular:  Maintain MAP greater than 65, not currently on any vasoactive agents  Hold home statin in the setting of n.p.o. status  Has been unable to tolerate GDMT in the past due to allergies and other comorbidities  ASA given hx of CAD   Hold Eliquis while NPO, if prolonged will start heparin ggt    GI:  NPO  Home PPI    Renal/Volume Status (Intra & Extravascular):  Appears dry on exam, start LR @ 100cc/hr     Endocrine  Stress dose steroids 100mg Q8  ENDO consult Re: Adrenal insufficieny  Should be on 20mg Prednisone NOT 15mg when acute process resolved   Check Cortisol  Check TSH with reflex     Infectious Disease:  Empiric Vanco/Zosyn/Azitrho   Follow cultures   Urine Antigens   Procal   MRSA Swab     Heme/Onc:  Transfuse for symptomatic anemia, no current indication   Hold MMF     Ethics/Code Status:  Full Code    :  DVT Prophylaxis: Lovenox  GI Prophylaxis: PPI  Diet: NPO  CVC: NA  [place]: NA  [place]: NA  Restraints: NA  Dispo: ICU    Critical Care Time:  35 min    Hay Mckeon DO

## 2024-05-07 NOTE — Clinical Note
The PACEMAKER, DUAL CHAMBER, NANCY MRI XT DR - [id number] device was inserted. The leads were placed into the connector and visually verified to be in correct position. Injury current obtained.

## 2024-05-07 NOTE — Clinical Note
Pt placed prone, moaning entire timed doing procedure, Dr. Tucker was only able to remove maybe 1mls of slight pink fluid, sent to lab, placed bandaid on site, ICU nurse Aliya and Garret Morfin rad tech transported pt back to room in stable condition

## 2024-05-08 ENCOUNTER — PATIENT OUTREACH (OUTPATIENT)
Dept: PRIMARY CARE | Facility: CLINIC | Age: 64
End: 2024-05-08

## 2024-05-08 ENCOUNTER — APPOINTMENT (OUTPATIENT)
Dept: NEUROLOGY | Facility: HOSPITAL | Age: 64
DRG: 981 | End: 2024-05-08
Payer: MEDICARE

## 2024-05-08 DIAGNOSIS — E11.42 DM TYPE 2 WITH DIABETIC PERIPHERAL NEUROPATHY (MULTI): ICD-10-CM

## 2024-05-08 DIAGNOSIS — M32.9 LUPUS (MULTI): ICD-10-CM

## 2024-05-08 DIAGNOSIS — R56.9 SEIZURE (MULTI): ICD-10-CM

## 2024-05-08 LAB
ALBUMIN SERPL BCP-MCNC: 2.7 G/DL (ref 3.4–5)
ALP SERPL-CCNC: 120 U/L (ref 33–136)
ALT SERPL W P-5'-P-CCNC: 22 U/L (ref 7–45)
AMPHETAMINES UR QL SCN: NORMAL
ANION GAP SERPL CALC-SCNC: 12 MMOL/L (ref 10–20)
ANION GAP SERPL CALC-SCNC: 12 MMOL/L (ref 10–20)
APPEARANCE UR: CLEAR
AST SERPL W P-5'-P-CCNC: 19 U/L (ref 9–39)
BACTERIA #/AREA URNS AUTO: ABNORMAL /HPF
BARBITURATES UR QL SCN: NORMAL
BASO STIPL BLD QL SMEAR: PRESENT
BASOPHILS # BLD MANUAL: 0 X10*3/UL (ref 0–0.1)
BASOPHILS NFR BLD MANUAL: 0 %
BENZODIAZ UR QL SCN: NORMAL
BILIRUB SERPL-MCNC: 0.2 MG/DL (ref 0–1.2)
BILIRUB UR STRIP.AUTO-MCNC: NEGATIVE MG/DL
BUN SERPL-MCNC: 27 MG/DL (ref 6–23)
BUN SERPL-MCNC: 28 MG/DL (ref 6–23)
BZE UR QL SCN: NORMAL
CALCIUM SERPL-MCNC: 7.7 MG/DL (ref 8.6–10.3)
CALCIUM SERPL-MCNC: 7.9 MG/DL (ref 8.6–10.3)
CANNABINOIDS UR QL SCN: NORMAL
CHLORIDE SERPL-SCNC: 115 MMOL/L (ref 98–107)
CHLORIDE SERPL-SCNC: 116 MMOL/L (ref 98–107)
CO2 SERPL-SCNC: 22 MMOL/L (ref 21–32)
CO2 SERPL-SCNC: 25 MMOL/L (ref 21–32)
COLOR UR: YELLOW
CORTIS SERPL-MCNC: 60.3 UG/DL (ref 2.5–20)
CREAT SERPL-MCNC: 1.6 MG/DL (ref 0.5–1.05)
CREAT SERPL-MCNC: 1.7 MG/DL (ref 0.5–1.05)
EGFRCR SERPLBLD CKD-EPI 2021: 34 ML/MIN/1.73M*2
EGFRCR SERPLBLD CKD-EPI 2021: 36 ML/MIN/1.73M*2
EOSINOPHIL # BLD MANUAL: 0 X10*3/UL (ref 0–0.7)
EOSINOPHIL NFR BLD MANUAL: 0 %
ERYTHROCYTE [DISTWIDTH] IN BLOOD BY AUTOMATED COUNT: 18.4 % (ref 11.5–14.5)
FENTANYL+NORFENTANYL UR QL SCN: NORMAL
GLUCOSE SERPL-MCNC: 171 MG/DL (ref 74–99)
GLUCOSE SERPL-MCNC: 177 MG/DL (ref 74–99)
GLUCOSE UR STRIP.AUTO-MCNC: ABNORMAL MG/DL
HCT VFR BLD AUTO: 24.4 % (ref 36–46)
HGB BLD-MCNC: 7.5 G/DL (ref 12–16)
HOLD SPECIMEN: NORMAL
HYALINE CASTS #/AREA URNS AUTO: ABNORMAL /LPF
HYPOCHROMIA BLD QL SMEAR: ABNORMAL
IMM GRANULOCYTES # BLD AUTO: 0.44 X10*3/UL (ref 0–0.7)
IMM GRANULOCYTES NFR BLD AUTO: 12.2 % (ref 0–0.9)
KETONES UR STRIP.AUTO-MCNC: NEGATIVE MG/DL
LEGIONELLA AG UR QL: NEGATIVE
LEUKOCYTE ESTERASE UR QL STRIP.AUTO: ABNORMAL
LEVETIRACETAM SERPL-MCNC: 52 UG/ML (ref 10–40)
LYMPHOCYTES # BLD MANUAL: 0.79 X10*3/UL (ref 1.2–4.8)
LYMPHOCYTES NFR BLD MANUAL: 22 %
MAGNESIUM SERPL-MCNC: 1.86 MG/DL (ref 1.6–2.4)
MAGNESIUM SERPL-MCNC: 2.16 MG/DL (ref 1.6–2.4)
MCH RBC QN AUTO: 30 PG (ref 26–34)
MCHC RBC AUTO-ENTMCNC: 30.7 G/DL (ref 32–36)
MCV RBC AUTO: 98 FL (ref 80–100)
METAMYELOCYTES # BLD MANUAL: 0.04 X10*3/UL
METAMYELOCYTES NFR BLD MANUAL: 1 %
METHADONE UR QL SCN: NORMAL
MONOCYTES # BLD MANUAL: 0.14 X10*3/UL (ref 0.1–1)
MONOCYTES NFR BLD MANUAL: 4 %
MRSA DNA SPEC QL NAA+PROBE: NOT DETECTED
MYELOCYTES # BLD MANUAL: 0.07 X10*3/UL
MYELOCYTES NFR BLD MANUAL: 2 %
NEUTROPHILS # BLD MANUAL: 2.52 X10*3/UL (ref 1.2–7.7)
NEUTS BAND # BLD MANUAL: 0.07 X10*3/UL (ref 0–0.7)
NEUTS BAND NFR BLD MANUAL: 2 %
NEUTS SEG # BLD MANUAL: 2.45 X10*3/UL (ref 1.2–7)
NEUTS SEG NFR BLD MANUAL: 68 %
NITRITE UR QL STRIP.AUTO: NEGATIVE
NRBC BLD-RTO: 7.7 /100 WBCS (ref 0–0)
OPIATES UR QL SCN: NORMAL
OVALOCYTES BLD QL SMEAR: ABNORMAL
OXYCODONE+OXYMORPHONE UR QL SCN: NORMAL
PCP UR QL SCN: NORMAL
PH UR STRIP.AUTO: 5 [PH]
PLATELET # BLD AUTO: 127 X10*3/UL (ref 150–450)
POTASSIUM SERPL-SCNC: 4.5 MMOL/L (ref 3.5–5.3)
POTASSIUM SERPL-SCNC: 5 MMOL/L (ref 3.5–5.3)
PROCALCITONIN SERPL-MCNC: 1.16 NG/ML
PROCALCITONIN SERPL-MCNC: 3.28 NG/ML
PROT SERPL-MCNC: 5.4 G/DL (ref 6.4–8.2)
PROT UR STRIP.AUTO-MCNC: ABNORMAL MG/DL
RBC # BLD AUTO: 2.5 X10*6/UL (ref 4–5.2)
RBC # UR STRIP.AUTO: NEGATIVE /UL
RBC #/AREA URNS AUTO: ABNORMAL /HPF
RBC MORPH BLD: ABNORMAL
S PNEUM AG UR QL: NEGATIVE
SODIUM SERPL-SCNC: 145 MMOL/L (ref 136–145)
SODIUM SERPL-SCNC: 147 MMOL/L (ref 136–145)
SP GR UR STRIP.AUTO: 1.02
TOTAL CELLS COUNTED BLD: 100
UROBILINOGEN UR STRIP.AUTO-MCNC: <2 MG/DL
VANCOMYCIN SERPL-MCNC: 15.9 UG/ML (ref 5–20)
VANCOMYCIN SERPL-MCNC: 25.8 UG/ML (ref 5–20)
VARIANT LYMPHS # BLD MANUAL: 0.04 X10*3/UL (ref 0–0.5)
VARIANT LYMPHS NFR BLD: 1 %
WBC # BLD AUTO: 3.6 X10*3/UL (ref 4.4–11.3)
WBC #/AREA URNS AUTO: ABNORMAL /HPF

## 2024-05-08 PROCEDURE — 83735 ASSAY OF MAGNESIUM: CPT | Mod: 91 | Performed by: HOSPITALIST

## 2024-05-08 PROCEDURE — 99222 1ST HOSP IP/OBS MODERATE 55: CPT

## 2024-05-08 PROCEDURE — 80048 BASIC METABOLIC PNL TOTAL CA: CPT | Mod: CCI | Performed by: HOSPITALIST

## 2024-05-08 PROCEDURE — 2500000004 HC RX 250 GENERAL PHARMACY W/ HCPCS (ALT 636 FOR OP/ED)

## 2024-05-08 PROCEDURE — 95718 EEG PHYS/QHP 2-12 HR W/VEEG: CPT | Performed by: PSYCHIATRY & NEUROLOGY

## 2024-05-08 PROCEDURE — 80202 ASSAY OF VANCOMYCIN: CPT

## 2024-05-08 PROCEDURE — 2500000004 HC RX 250 GENERAL PHARMACY W/ HCPCS (ALT 636 FOR OP/ED): Performed by: HOSPITALIST

## 2024-05-08 PROCEDURE — 2500000002 HC RX 250 W HCPCS SELF ADMINISTERED DRUGS (ALT 637 FOR MEDICARE OP, ALT 636 FOR OP/ED): Performed by: EMERGENCY MEDICINE

## 2024-05-08 PROCEDURE — 94640 AIRWAY INHALATION TREATMENT: CPT

## 2024-05-08 PROCEDURE — 95700 EEG CONT REC W/VID EEG TECH: CPT

## 2024-05-08 PROCEDURE — 80053 COMPREHEN METABOLIC PANEL: CPT | Performed by: HOSPITALIST

## 2024-05-08 PROCEDURE — 2020000001 HC ICU ROOM DAILY

## 2024-05-08 PROCEDURE — 85007 BL SMEAR W/DIFF WBC COUNT: CPT | Performed by: HOSPITALIST

## 2024-05-08 PROCEDURE — 2500000005 HC RX 250 GENERAL PHARMACY W/O HCPCS: Performed by: EMERGENCY MEDICINE

## 2024-05-08 PROCEDURE — 36415 COLL VENOUS BLD VENIPUNCTURE: CPT | Performed by: EMERGENCY MEDICINE

## 2024-05-08 PROCEDURE — 85027 COMPLETE CBC AUTOMATED: CPT | Performed by: HOSPITALIST

## 2024-05-08 PROCEDURE — 2500000004 HC RX 250 GENERAL PHARMACY W/ HCPCS (ALT 636 FOR OP/ED): Performed by: EMERGENCY MEDICINE

## 2024-05-08 PROCEDURE — 95711 VEEG 2-12 HR UNMONITORED: CPT

## 2024-05-08 PROCEDURE — 83735 ASSAY OF MAGNESIUM: CPT | Performed by: HOSPITALIST

## 2024-05-08 PROCEDURE — C9113 INJ PANTOPRAZOLE SODIUM, VIA: HCPCS | Performed by: EMERGENCY MEDICINE

## 2024-05-08 PROCEDURE — 84145 PROCALCITONIN (PCT): CPT | Mod: ELYLAB | Performed by: EMERGENCY MEDICINE

## 2024-05-08 PROCEDURE — 36415 COLL VENOUS BLD VENIPUNCTURE: CPT | Performed by: HOSPITALIST

## 2024-05-08 PROCEDURE — 99291 CRITICAL CARE FIRST HOUR: CPT | Performed by: EMERGENCY MEDICINE

## 2024-05-08 PROCEDURE — 2500000002 HC RX 250 W HCPCS SELF ADMINISTERED DRUGS (ALT 637 FOR MEDICARE OP, ALT 636 FOR OP/ED): Performed by: HOSPITALIST

## 2024-05-08 PROCEDURE — 99490 CHRNC CARE MGMT STAFF 1ST 20: CPT | Performed by: FAMILY MEDICINE

## 2024-05-08 RX ORDER — MULTIVIT-MIN/IRON FUM/FOLIC AC 7.5 MG-4
1 TABLET ORAL DAILY
Status: ON HOLD | COMMUNITY

## 2024-05-08 RX ORDER — BALSAM PERU/CASTOR OIL
OINTMENT (GRAM) TOPICAL 2 TIMES DAILY
Status: ON HOLD | COMMUNITY

## 2024-05-08 RX ORDER — INSULIN LISPRO 100 [IU]/ML
INJECTION, SOLUTION INTRAVENOUS; SUBCUTANEOUS 3 TIMES DAILY PRN
Status: ON HOLD | COMMUNITY

## 2024-05-08 RX ORDER — LABETALOL HYDROCHLORIDE 5 MG/ML
20 INJECTION, SOLUTION INTRAVENOUS EVERY 4 HOURS PRN
Status: DISCONTINUED | OUTPATIENT
Start: 2024-05-08 | End: 2024-05-09

## 2024-05-08 RX ORDER — LINEZOLID 2 MG/ML
600 INJECTION, SOLUTION INTRAVENOUS EVERY 12 HOURS
Status: DISCONTINUED | OUTPATIENT
Start: 2024-05-08 | End: 2024-05-09

## 2024-05-08 RX ORDER — LEVETIRACETAM 5 MG/ML
500 INJECTION INTRAVASCULAR EVERY 12 HOURS
Status: DISCONTINUED | OUTPATIENT
Start: 2024-05-08 | End: 2024-05-14

## 2024-05-08 RX ORDER — MYCOPHENOLATE MOFETIL 250 MG/1
1000 CAPSULE ORAL 2 TIMES DAILY
Status: DISCONTINUED | OUTPATIENT
Start: 2024-05-08 | End: 2024-05-18 | Stop reason: HOSPADM

## 2024-05-08 RX ORDER — INSULIN GLARGINE 100 [IU]/ML
10 INJECTION, SOLUTION SUBCUTANEOUS EVERY MORNING
Status: ON HOLD | COMMUNITY

## 2024-05-08 RX ORDER — MORPHINE SULFATE 2 MG/ML
2 INJECTION, SOLUTION INTRAMUSCULAR; INTRAVENOUS EVERY 4 HOURS PRN
Status: DISCONTINUED | OUTPATIENT
Start: 2024-05-08 | End: 2024-05-08

## 2024-05-08 RX ORDER — LEVETIRACETAM 500 MG/1
500 TABLET ORAL 2 TIMES DAILY
Status: DISCONTINUED | OUTPATIENT
Start: 2024-05-08 | End: 2024-05-18 | Stop reason: HOSPADM

## 2024-05-08 RX ORDER — GLUCAGON HCL 1 MG
1 VIAL (EA) INJECTION AS NEEDED
Status: ON HOLD | COMMUNITY

## 2024-05-08 RX ORDER — CEFTRIAXONE 1 G/50ML
1 INJECTION, SOLUTION INTRAVENOUS DAILY
Qty: 250 ML | Refills: 0 | Status: DISCONTINUED | OUTPATIENT
Start: 2024-05-08 | End: 2024-05-09

## 2024-05-08 RX ORDER — HEPARIN SODIUM 5000 [USP'U]/ML
5000 INJECTION, SOLUTION INTRAVENOUS; SUBCUTANEOUS EVERY 8 HOURS
Status: DISCONTINUED | OUTPATIENT
Start: 2024-05-08 | End: 2024-05-12

## 2024-05-08 RX ORDER — ACETAMINOPHEN 500 MG
1000 TABLET ORAL EVERY 4 HOURS PRN
COMMUNITY
End: 2024-05-18 | Stop reason: HOSPADM

## 2024-05-08 RX ORDER — MAGNESIUM SULFATE HEPTAHYDRATE 40 MG/ML
2 INJECTION, SOLUTION INTRAVENOUS ONCE
Status: COMPLETED | OUTPATIENT
Start: 2024-05-08 | End: 2024-05-08

## 2024-05-08 RX ADMIN — MAGNESIUM SULFATE HEPTAHYDRATE 2 G: 40 INJECTION, SOLUTION INTRAVENOUS at 08:28

## 2024-05-08 RX ADMIN — LEVETIRACETAM 500 MG: 5 INJECTION INTRAVENOUS at 20:15

## 2024-05-08 RX ADMIN — HYDROCORTISONE SODIUM SUCCINATE 100 MG: 100 INJECTION, POWDER, FOR SOLUTION INTRAMUSCULAR; INTRAVENOUS at 07:02

## 2024-05-08 RX ADMIN — IPRATROPIUM BROMIDE AND ALBUTEROL SULFATE 3 ML: 2.5; .5 SOLUTION RESPIRATORY (INHALATION) at 07:31

## 2024-05-08 RX ADMIN — LINEZOLID 600 MG: 600 INJECTION, SOLUTION INTRAVENOUS at 14:42

## 2024-05-08 RX ADMIN — HEPARIN SODIUM 5000 UNITS: 5000 INJECTION INTRAVENOUS; SUBCUTANEOUS at 17:03

## 2024-05-08 RX ADMIN — HEPARIN SODIUM 5000 UNITS: 5000 INJECTION INTRAVENOUS; SUBCUTANEOUS at 09:33

## 2024-05-08 RX ADMIN — LEVETIRACETAM 500 MG: 5 INJECTION INTRAVENOUS at 07:02

## 2024-05-08 RX ADMIN — IPRATROPIUM BROMIDE AND ALBUTEROL SULFATE 3 ML: 2.5; .5 SOLUTION RESPIRATORY (INHALATION) at 20:52

## 2024-05-08 RX ADMIN — MORPHINE SULFATE 2 MG: 2 INJECTION, SOLUTION INTRAMUSCULAR; INTRAVENOUS at 05:01

## 2024-05-08 RX ADMIN — Medication 2 L/MIN: at 07:31

## 2024-05-08 RX ADMIN — CEFTRIAXONE SODIUM 1 G: 1 INJECTION, SOLUTION INTRAVENOUS at 16:58

## 2024-05-08 RX ADMIN — SODIUM CHLORIDE, POTASSIUM CHLORIDE, SODIUM LACTATE AND CALCIUM CHLORIDE 1000 ML: 600; 310; 30; 20 INJECTION, SOLUTION INTRAVENOUS at 18:47

## 2024-05-08 RX ADMIN — INSULIN LISPRO 2 UNITS: 100 INJECTION, SOLUTION INTRAVENOUS; SUBCUTANEOUS at 04:24

## 2024-05-08 RX ADMIN — Medication 2 L/MIN: at 14:10

## 2024-05-08 RX ADMIN — LABETALOL HYDROCHLORIDE 20 MG: 5 INJECTION, SOLUTION INTRAVENOUS at 13:52

## 2024-05-08 RX ADMIN — Medication 2 L/MIN: at 13:10

## 2024-05-08 RX ADMIN — PANTOPRAZOLE SODIUM 40 MG: 40 INJECTION, POWDER, FOR SOLUTION INTRAVENOUS at 11:46

## 2024-05-08 RX ADMIN — LEVETIRACETAM 500 MG: 5 INJECTION INTRAVENOUS at 09:33

## 2024-05-08 RX ADMIN — HYDROCORTISONE SODIUM SUCCINATE 100 MG: 100 INJECTION, POWDER, FOR SOLUTION INTRAMUSCULAR; INTRAVENOUS at 16:24

## 2024-05-08 RX ADMIN — Medication 2 L/MIN: at 20:52

## 2024-05-08 RX ADMIN — IPRATROPIUM BROMIDE AND ALBUTEROL SULFATE 3 ML: 2.5; .5 SOLUTION RESPIRATORY (INHALATION) at 13:10

## 2024-05-08 ASSESSMENT — COGNITIVE AND FUNCTIONAL STATUS - GENERAL
TURNING FROM BACK TO SIDE WHILE IN FLAT BAD: TOTAL
CLIMB 3 TO 5 STEPS WITH RAILING: TOTAL
MOBILITY SCORE: 6
MOVING TO AND FROM BED TO CHAIR: TOTAL
STANDING UP FROM CHAIR USING ARMS: TOTAL
MOVING TO AND FROM BED TO CHAIR: TOTAL
WALKING IN HOSPITAL ROOM: TOTAL
MOVING FROM LYING ON BACK TO SITTING ON SIDE OF FLAT BED WITH BEDRAILS: TOTAL
CLIMB 3 TO 5 STEPS WITH RAILING: TOTAL
TURNING FROM BACK TO SIDE WHILE IN FLAT BAD: TOTAL
STANDING UP FROM CHAIR USING ARMS: TOTAL
MOVING FROM LYING ON BACK TO SITTING ON SIDE OF FLAT BED WITH BEDRAILS: TOTAL
WALKING IN HOSPITAL ROOM: TOTAL
MOBILITY SCORE: 6
PATIENT BASELINE BEDBOUND: UNABLE TO ASSESS AT THIS TIME

## 2024-05-08 ASSESSMENT — PAIN SCALES - PAIN ASSESSMENT IN ADVANCED DEMENTIA (PAINAD)
BODYLANGUAGE: RELAXED
BODYLANGUAGE: RELAXED
TOTALSCORE: 4
BREATHING: NORMAL
TOTALSCORE: 0
FACIALEXPRESSION: SMILING OR INEXPRESSIVE
BREATHING: NORMAL
TOTALSCORE: REPOSITIONED;MEDICATION (SEE MAR)
BODYLANGUAGE: TENSE, DISTRESSED PACING, FIDGETING
CONSOLABILITY: NO NEED TO CONSOLE
CONSOLABILITY: NO NEED TO CONSOLE
FACIALEXPRESSION: FACIAL GRIMACING
CONSOLABILITY: NO NEED TO CONSOLE
NEGVOCALIZATION: OCCASIONAL MOAN/GROAN, LOW SPEECH, NEGATIVE/DISAPPROVING QUALITY
BREATHING: NORMAL
FACIALEXPRESSION: SMILING OR INEXPRESSIVE
TOTALSCORE: 0

## 2024-05-08 ASSESSMENT — ACTIVITIES OF DAILY LIVING (ADL)
BATHING: DEPENDENT
HEARING - RIGHT EAR: FUNCTIONAL
LACK_OF_TRANSPORTATION: PATIENT UNABLE TO ANSWER
TOILETING: DEPENDENT
ADEQUATE_TO_COMPLETE_ADL: UNABLE TO ASSESS
WALKS IN HOME: DEPENDENT
DRESSING YOURSELF: DEPENDENT
ASSISTIVE_DEVICE: OXYGEN
HEARING - LEFT EAR: FUNCTIONAL
GROOMING: DEPENDENT
JUDGMENT_ADEQUATE_SAFELY_COMPLETE_DAILY_ACTIVITIES: UNABLE TO ASSESS
PATIENT'S MEMORY ADEQUATE TO SAFELY COMPLETE DAILY ACTIVITIES?: UNABLE TO ASSESS
FEEDING YOURSELF: DEPENDENT

## 2024-05-08 ASSESSMENT — PAIN - FUNCTIONAL ASSESSMENT: PAIN_FUNCTIONAL_ASSESSMENT: PAINAD (PAIN ASSESSMENT IN ADVANCED DEMENTIA SCALE)

## 2024-05-08 ASSESSMENT — PAIN SCALES - GENERAL: PAINLEVEL_OUTOF10: 0 - NO PAIN

## 2024-05-08 NOTE — SIGNIFICANT EVENT
"Preliminary EEG Report    The first 30 minutes of this vEEG are indicative of a severe diffuse encephalopathy.    This EEG was read up until 7:44AM on 05/08/24.     The final impression will be available tomorrow under Chart Review in the Media tab.   To discontinue video EEG, place \"Discontinue Continuous VEEG\" order.     Lucita Banuelos MD  Epilepsy Fellow [id number]     "

## 2024-05-08 NOTE — CONSULTS
"Inpatient consult to Neurology  Consult performed by: JIM Webb-CNP  Consult ordered by: Hay Mckeon DO          History Of Present Illness  Bing Holliday is a 63 y.o. female presenting with change in MS. The pt. Is a poor historian, much history obtained from chart review. She is from [facility] where staff noticed her to be not herself. She has a history of hypoglycemia and when blood sugar was checked at facility-it was 40. Glucagon was given and repeat BG was 125. She continued to be out of it and so was brought to the ED for further evaluation. She has an extensive history of recent and prolonged hospitalizations which seemn to present with hypoglycemia, hyponatremia, and hypothermia (please review Dr. Mckeon' thorough H&P). At [facility], she had seizure-like activity that presented facial twitching, lip smacking, altered LOC, staring spells, speech arrest, and jerking. Continuous cvEEG (1/17-18) while on Keppra: mod to sev diffuse encephalopathy, no lateralizing sign, no sz, + L arm jerk w/o EEG correlation. She was discharged on Keppra 500mg BID. She has not been able to follow up with a Neurologist due to frequent hospitalizations. She currently is attached to cvEEG since early am today. Prelim indicative of a severe diffuse encephalopathy. On exam, she rouses to painful stimuli and yells out \"stop\", she is not answering questions appropriately, she continues with facial twitching and BUE jerking when she is agitated.   ROS defferred.   Past Medical History  Past Medical History:   Diagnosis Date    Abnormal kidney function 04/04/2023    Acute headache 03/10/2024    Acute low back pain 10/30/2023    Acute upper respiratory infection, unspecified 03/04/2020    Acute URI    Acute upper respiratory infection, unspecified 09/30/2015    URTI (acute upper respiratory infection)    Arthritis     Atypical facial pain 03/10/2024    Body mass index (BMI) 23.0-23.9, adult 10/15/2021    BMI 23.0-23.9, " adult    Body mass index (BMI) 33.0-33.9, adult 03/04/2020    BMI 33.0-33.9,adult    Cardiomegaly 08/27/2013    Left ventricular hypertrophy    Chest pain 06/10/2022    Comment on above: Added by Problem List Migration; 2013-3-12; Moved to Suppressed Nov 25 2013 9:16PM; Comment on above: Added by Problem List Migration; 2013-3-12; Moved to Suppressed Nov 25 2013 9:16PM;    Chronic kidney disease, stage 3 unspecified (Multi) 07/02/2013    Chronic kidney disease, stage III (moderate)    Confusional state 03/10/2024    Contact with and (suspected) exposure to covid-19 04/04/2023    Delirium 03/10/2024    Disease of pericardium, unspecified ([facility]) 07/02/2013    Pericardial disease    Encounter for follow-up examination after completed treatment for conditions other than malignant neoplasm 10/06/2022    Hospital discharge follow-up    Generalized contraction of visual field, right eye 01/29/2015    Generalized contraction of visual field of right eye    Hearing loss 03/10/2024    History of cataract 03/10/2024    History of thrombocytopenia 03/10/2024    Comment on above: Added by Problem List Migration; 2013-7-2;    Homonymous bilateral field defects, right side 04/29/2016    Homonymous bilateral field defects of right side    Hypertensive chronic kidney disease with stage 1 through stage 4 chronic kidney disease, or unspecified chronic kidney disease 07/02/2013    Nephrosclerosis    Laceration without foreign body, left foot, initial encounter 07/03/2018    Foot laceration, left, initial encounter    Localized edema 03/10/2024    Mass of skin 03/10/2024    Migraine with aura, not intractable, without status migrainosus 10/24/2022    Ocular migraine    Open wound 03/10/2024    Open wound of left foot 03/10/2024    Other conditions influencing health status 07/02/2013    Chronic Glomerulonephritis In Diseases Classified Elsewhere    Other conditions influencing health status 07/02/2013    Progressive Familial  Myoclonic Epilepsy    Other conditions influencing health status 07/02/2013    Protein S Deficiency    Other conditions influencing health status 05/22/2015    Familial Combined Hyperlipidemia    Other conditions influencing health status 10/24/2022    IDDM (insulin dependent diabetes mellitus)    Other conditions influencing health status 03/14/2022    Diabetes mellitus, insulin dependent (IDDM), uncontrolled    Other long term (current) drug therapy 10/24/2022    Long-term use of Plaquenil    Overweight with body mass index (BMI) 25.0-29.9 03/10/2024    Pain of toe 03/10/2024    Personal history of COVID-19 04/04/2023    Personal history of diseases of the blood and blood-forming organs and certain disorders involving the immune mechanism 07/02/2013    History of thrombocytopenia    Personal history of diseases of the skin and subcutaneous tissue 08/11/2015    History of foot ulcer    Personal history of nephrotic syndrome 07/02/2013    History of nephrotic syndrome    Personal history of other diseases of the circulatory system 08/27/2013    History of sinus tachycardia    Personal history of other diseases of the nervous system and sense organs     History of cataract    Personal history of other diseases of the respiratory system     History of bronchitis    Personal history of other infectious and parasitic diseases 07/02/2013    History of hepatitis    Personal history of other specified conditions     History of shortness of breath    Personal history of other specified conditions 08/27/2013    History of edema    Posterior epistaxis 03/10/2024    Puckering of macula, right eye 10/24/2022    ERM OD (epiretinal membrane, right eye)    Raynaud's syndrome without gangrene 07/02/2013    Raynaud's disease    Sinusitis 03/10/2024    Systemic lupus erythematosus, unspecified (Multi) 07/24/2015    SLE (systemic lupus erythematosus)    Systemic lupus erythematosus, unspecified (Multi) 07/24/2015    SLE (systemic  lupus erythematosus)    Systemic lupus erythematosus, unspecified (Multi) 07/24/2015    Systemic lupus    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) 07/02/2013    Type 2 diabetes with nephropathy    Type 2 diabetes mellitus with mild nonproliferative diabetic retinopathy without macular edema, left eye (Multi) 07/27/2015    Non-proliferative diabetic retinopathy, left eye    Type 2 diabetes mellitus with mild nonproliferative diabetic retinopathy without macular edema, unspecified eye (Multi) 07/24/2015    Mild non proliferative diabetic retinopathy    Type 2 diabetes mellitus with proliferative diabetic retinopathy without macular edema, right eye (Multi) 07/27/2015    Proliferative diabetic retinopathy of right eye    Type 2 diabetes mellitus with proliferative diabetic retinopathy without macular edema, unspecified eye (Multi) 07/24/2015    Diabetic proliferative retinopathy    Unspecified acute and subacute iridocyclitis 07/24/2015    Acute iritis, right eye    Unspecified open wound, left foot, sequela 07/03/2018    Wound, open, foot, left, sequela     Surgical History  Past Surgical History:   Procedure Laterality Date    ANKLE SURGERY  01/29/2015    Ankle Surgery    CHOLECYSTECTOMY  01/29/2015    Cholecystectomy    CT GUIDED PERCUTANEOUS BIOPSY BONE DEEP  5/4/2021    CT GUIDED PERCUTANEOUS BIOPSY BONE DEEP 5/4/2021 [facility]    EYE SURGERY  03/06/2015    Eye Surgery    FOOT SURGERY  01/29/2015    Foot Surgery    MR HEAD ANGIO WO IV CONTRAST  7/26/2013    MR HEAD ANGIO WO IV CONTRAST 7/26/2013 [facility]ICAL [facility]GAC[facility]    MR HEAD ANGIO WO IV CONTRAST  9/17/2021    MR HEAD ANGIO WO IV CONTRAST 9/17/2021 [facility]    MR HEAD ANGIO WO IV CONTRAST  3/25/2023    MR HEAD ANGIO WO IV CONTRAST [facility]    MR NECK ANGIO WO IV CONTRAST  7/26/2013    MR NECK ANGIO WO IV CONTRAST 7/26/2013 [facility]    MR NECK ANGIO WO IV CONTRAST  9/17/2021    MR NECK ANGIO WO IV CONTRAST  9/17/2021 [facility]    MR NECK ANGIO WO IV CONTRAST  3/25/2023    MR NECK ANGIO WO IV CONTRAST [facility]    OTHER SURGICAL HISTORY  01/29/2015    Creation Of Pericardial Window    OTHER SURGICAL HISTORY  01/29/2015    Quadricepsplasty    TOTAL HIP ARTHROPLASTY  01/29/2015    Hip Replacement     Social History  Social History     Tobacco Use    Smoking status: Never     Passive exposure: Never    Smokeless tobacco: Never   Vaping Use    Vaping status: Never Used   Substance Use Topics    Alcohol use: Not Currently     Comment: RARE    Drug use: Never     Allergies  Ace inhibitors, Hydroxychloroquine, Lisinopril, Penicillins, and Sulfa (sulfonamide antibiotics)  Medications Prior to Admission   Medication Sig Dispense Refill Last Dose    apixaban (Eliquis) 2.5 mg tablet Take 1 tablet (2.5 mg) by mouth 2 times a day. 60 tablet 1 5/7/2024 at 0900    fluticasone-umeclidin-vilanter (TRELEGY-ELLIPTA) 200-62.5-25 mcg blister with device Inhale 1 puff once daily. 60 each 5 5/7/2024 at 0900    folic acid (Folvite) 1 mg tablet TAKE 1 TABLET BY MOUTH EVERY DAY 90 tablet 1 5/7/2024 at 0900    glucagon HCL (Glucagon, HCl, Emergency Kit) 1 mg recon soln Inject 1 mg into the muscle if needed.   5/7/2024 at 1225    guaiFENesin (Mucinex) 600 mg 12 hr tablet Take 2 tablets (1,200 mg) by mouth 2 times a day. Do not crush, chew, or split.   5/7/2024 at 0900    insulin glargine (Lantus U-100 Insulin) 100 unit/mL injection Inject 10 Units under the skin once daily in the morning. Take as directed per insulin instructions.   5/7/2024 at 0800    insulin lispro (HumaLOG) 100 unit/mL injection Inject under the skin 3 times a day as needed for high blood sugar (before meals). Take as directed per insulin Sliding Scale instructions.  0-150 = 0 units  151-200 = 2 units  201-250 = 4 units  251-300 = 6 units  301-350 = 8 units  351-400 = 10 units  >400 = give 10 units and contact MD   5/7/2024 at 0730- 8 UNITS GIVEN    levETIRAcetam  (Keppra) 500 mg tablet Take 1 tablet (500 mg) by mouth every 12 hours. 60 tablet 0 5/7/2024 at 0900    magnesium oxide (Mag-Ox) 400 mg (241.3 mg magnesium) tablet Take 2 tablets (800 mg) by mouth once daily. Do not fill before May 1, 2024.   5/7/2024 at 0900    multivitamin with minerals tablet Take 1 tablet by mouth once daily.   5/7/2024 at 0900    mycophenolate (Cellcept) 500 mg tablet TAKE 2 TABLETS BY MOUTH TWICE A  tablet 0 5/7/2024 at 0900    nut.tx.gluc intol,lf,soy/fiber (BOOST GLUCOSE CONTROL ORAL) Take 8 fluid ounces by mouth once daily in the morning.   5/7/2024 at 0900    OLANZapine (ZyPREXA) 5 mg tablet Take 1 tablet (5 mg) by mouth 2 times a day.   5/7/2024 at 0900    pantoprazole (ProtoNix) 40 mg EC tablet TAKE 1 TABLET BY MOUTH EVERY DAY 90 tablet 1 5/7/2024 at 0800    predniSONE (Deltasone) 5 mg tablet Take 3 tablets (15 mg) by mouth once daily.   5/7/2024 at 0900    thiamine 100 mg tablet Take 1 tablet (100 mg) by mouth once daily.   5/7/2024 at 0900    acetaminophen (Tylenol) 325 mg tablet Take 2 tablets (650 mg) by mouth every 4 hours if needed for mild pain (1 - 3), moderate pain (4 - 6) or fever (temp greater than 38.0 C).   5/2/2024    acetaminophen (Tylenol) 500 mg tablet Take 2 tablets (1,000 mg) by mouth every 4 hours if needed for mild pain (1 - 3) or moderate pain (4 - 6).   Unknown    albuterol 90 mcg/actuation inhaler Inhale 2 puffs every 6 hours if needed for shortness of breath or wheezing.   Unknown    atorvastatin (Lipitor) 40 mg tablet Take 1 tablet (40 mg) by mouth once daily. (Patient taking differently: Take 1 tablet (40 mg) by mouth once daily at bedtime.) 90 tablet 3 5/6/2024 at 2100    balsam peru-castor oiL (Venelex) ointment Apply topically 2 times a day. APPLY TO BUTTOCKS, SACRUM, AND THIGHS.   5/6/2024 at PM    blood-glucose sensor (Dexcom G6 Sensor) device Use to check sugars 3 times daily 4 each 2     Dexcom G4 platinum  (Dexcom G6 ) misc Use as  "instructed 1 each 0     Dexcom G4 platinum transmitter (Dexcom G6 Transmitter) device Use as instructed 1 each 0     meclizine (Antivert) 25 mg tablet Take 1 tablet (25 mg) by mouth every 12 hours if needed for dizziness.   Unknown    melatonin 5 mg tablet Take 1 tablet (5 mg) by mouth once daily at bedtime.   5/6/2024 at 2100    pen needle, diabetic 31 gauge x 5/16\" needle Use to inject 1-4 times daily as directed. 100 each 11        Review of Systems  Neurological Exam  Physical Exam  LIMITED NEURO EXAM    MENTAL STATUS    LOC Opens eyes to voice, grimaces to pain, not following commands       CRANIAL NERVES    CN II-blinked to threat    CN II/III-PERRLA      CN III/IV/VI/VIII-no spontaneous extraoccular movement, no nystagmus, no dysconjugate gaze, no fixed deviation    CN V/VII-corneal reflex present, no facial asymmetry, grimace response present      SENSORY AND MOTOR    Spontaneous movement present    Withdrawals from a painful stimulus      REFLEXES    Plantar response present    No posturing reflexes      COORDINATION AND GAIT    Untestable in critically ill patient d/t sedation      *Limited Neurological exam was completed d/t patient being critically ill, on sedation, inattentive, or uncooperative, etc.   Last Recorded Vitals  Blood pressure 124/59, pulse 68, temperature 36 °C (96.8 °F), resp. rate 17, height 1.676 m (5' 6\"), weight 93.6 kg (206 lb 5.6 oz), SpO2 91%.    Relevant Results  Results for orders placed or performed during the hospital encounter of 05/07/24 (from the past 24 hour(s))   Lactate   Result Value Ref Range    Lactate 2.9 (H) 0.4 - 2.0 mmol/L   BLOOD GAS ARTERIAL FULL PANEL   Result Value Ref Range    POCT pH, Arterial 7.32 (L) 7.38 - 7.42 pH    POCT pCO2, Arterial 46 (H) 38 - 42 mm Hg    POCT pO2, Arterial 81 (L) 85 - 95 mm Hg    POCT SO2, Arterial 97 94 - 100 %    POCT Oxy Hemoglobin, Arterial 95.2 94.0 - 98.0 %    POCT Hematocrit Calculated, Arterial 24.0 (L) 36.0 - 46.0 %    POCT " Sodium, Arterial 143 136 - 145 mmol/L    POCT Potassium, Arterial 4.3 3.5 - 5.3 mmol/L    POCT Chloride, Arterial 112 (H) 98 - 107 mmol/L    POCT Ionized Calcium, Arterial 1.25 1.10 - 1.33 mmol/L    POCT Glucose, Arterial 371 (H) 74 - 99 mg/dL    POCT Lactate, Arterial 1.3 0.4 - 2.0 mmol/L    POCT Base Excess, Arterial -2.3 (L) -2.0 - 3.0 mmol/L    POCT HCO3 Calculated, Arterial 23.7 22.0 - 26.0 mmol/L    POCT Hemoglobin, Arterial 8.0 (L) 12.0 - 16.0 g/dL    POCT Anion Gap, Arterial 12 10 - 25 mmo/L    Patient Temperature      FiO2 2 %    Apparatus CANNULA     Site of Arterial Puncture Radial Right     Jeronimo's Test Positive    Lactate   Result Value Ref Range    Lactate 0.9 0.4 - 2.0 mmol/L   Levetiracetam   Result Value Ref Range    Keppra 52 (H) 10 - 40 ug/mL   POCT GLUCOSE   Result Value Ref Range    POCT Glucose 284 (H) 74 - 99 mg/dL   Cortisol   Result Value Ref Range    Cortisol 60.3 (H) 2.5 - 20.0 ug/dL   Procalcitonin   Result Value Ref Range    Procalcitonin 1.16 (H) <=0.07 ng/mL   PST Top   Result Value Ref Range    Extra Tube Hold for add-ons.    Urinalysis with Reflex Culture and Microscopic   Result Value Ref Range    Color, Urine Yellow Straw, Yellow    Appearance, Urine Clear Clear    Specific Gravity, Urine 1.018 1.005 - 1.035    pH, Urine 5.0 5.0, 5.5, 6.0, 6.5, 7.0, 7.5, 8.0    Protein, Urine >=500 (3+) (N) NEGATIVE mg/dL    Glucose, Urine 50 (1+) (A) NEGATIVE mg/dL    Blood, Urine NEGATIVE NEGATIVE    Ketones, Urine NEGATIVE NEGATIVE mg/dL    Bilirubin, Urine NEGATIVE NEGATIVE    Urobilinogen, Urine <2.0 <2.0 mg/dL    Nitrite, Urine NEGATIVE NEGATIVE    Leukocyte Esterase, Urine SMALL (1+) (A) NEGATIVE   Extra Urine Gray Tube   Result Value Ref Range    Extra Tube Hold for add-ons.    Drug Screen, Urine   Result Value Ref Range    Amphetamine Screen, Urine Presumptive Negative Presumptive Negative    Barbiturate Screen, Urine Presumptive Negative Presumptive Negative    Benzodiazepines Screen,  Urine Presumptive Negative Presumptive Negative    Cannabinoid Screen, Urine Presumptive Negative Presumptive Negative    Cocaine Metabolite Screen, Urine Presumptive Negative Presumptive Negative    Fentanyl Screen, Urine Presumptive Negative Presumptive Negative    Opiate Screen, Urine Presumptive Negative Presumptive Negative    Oxycodone Screen, Urine Presumptive Negative Presumptive Negative    PCP Screen, Urine Presumptive Negative Presumptive Negative    Methadone Screen, Urine Presumptive Negative Presumptive Negative   Streptococcus pneumoniae Antigen, Urine    Specimen: Urine   Result Value Ref Range    Streptococcus pneumoniae Ag, Urine Negative Negative   Legionella Antigen, Urine    Specimen: Urine   Result Value Ref Range    L. pneumophila Urine Ag Negative Negative   Microscopic Only, Urine   Result Value Ref Range    WBC, Urine 21-50 (A) 1-5, NONE /HPF    RBC, Urine 6-10 (A) NONE, 1-2, 3-5 /HPF    Bacteria, Urine 1+ (A) NONE SEEN /HPF    Hyaline Casts, Urine OCCASIONAL (A) NONE /LPF   MRSA Surveillance for Vancomycin De-escalation, PCR    Specimen: Anterior Nares; Swab   Result Value Ref Range    MRSA PCR Not Detected Not Detected   Vancomycin   Result Value Ref Range    Vancomycin 25.8 (H) 5.0 - 20.0 ug/mL   Vancomycin   Result Value Ref Range    Vancomycin 15.9 5.0 - 20.0 ug/mL   CBC and Auto Differential   Result Value Ref Range    WBC 3.6 (L) 4.4 - 11.3 x10*3/uL    nRBC 7.7 (H) 0.0 - 0.0 /100 WBCs    RBC 2.50 (L) 4.00 - 5.20 x10*6/uL    Hemoglobin 7.5 (L) 12.0 - 16.0 g/dL    Hematocrit 24.4 (L) 36.0 - 46.0 %    MCV 98 80 - 100 fL    MCH 30.0 26.0 - 34.0 pg    MCHC 30.7 (L) 32.0 - 36.0 g/dL    RDW 18.4 (H) 11.5 - 14.5 %    Platelets 127 (L) 150 - 450 x10*3/uL    Immature Granulocytes %, Automated 12.2 (H) 0.0 - 0.9 %    Immature Granulocytes Absolute, Automated 0.44 0.00 - 0.70 x10*3/uL   Comprehensive Metabolic Panel   Result Value Ref Range    Glucose 171 (H) 74 - 99 mg/dL    Sodium 147 (H) 136  - 145 mmol/L    Potassium 4.5 3.5 - 5.3 mmol/L    Chloride 115 (H) 98 - 107 mmol/L    Bicarbonate 25 21 - 32 mmol/L    Anion Gap 12 10 - 20 mmol/L    Urea Nitrogen 28 (H) 6 - 23 mg/dL    Creatinine 1.60 (H) 0.50 - 1.05 mg/dL    eGFR 36 (L) >60 mL/min/1.73m*2    Calcium 7.9 (L) 8.6 - 10.3 mg/dL    Albumin 2.7 (L) 3.4 - 5.0 g/dL    Alkaline Phosphatase 120 33 - 136 U/L    Total Protein 5.4 (L) 6.4 - 8.2 g/dL    AST 19 9 - 39 U/L    Bilirubin, Total 0.2 0.0 - 1.2 mg/dL    ALT 22 7 - 45 U/L   Magnesium   Result Value Ref Range    Magnesium 1.86 1.60 - 2.40 mg/dL   Manual Differential   Result Value Ref Range    Neutrophils %, Manual 68.0 40.0 - 80.0 %    Bands %, Manual 2.0 0.0 - 5.0 %    Lymphocytes %, Manual 22.0 13.0 - 44.0 %    Monocytes %, Manual 4.0 2.0 - 10.0 %    Eosinophils %, Manual 0.0 0.0 - 6.0 %    Basophils %, Manual 0.0 0.0 - 2.0 %    Atypical Lymphocytes %, Manual 1.0 0.0 - 2.0 %    Metamyelocytes %, Manual 1.0 0.0 - 0.0 %    Myelocytes %, Manual 2.0 0.0 - 0.0 %    Seg Neutrophils Absolute, Manual 2.45 1.20 - 7.00 x10*3/uL    Bands Absolute, Manual 0.07 0.00 - 0.70 x10*3/uL    Lymphocytes Absolute, Manual 0.79 (L) 1.20 - 4.80 x10*3/uL    Monocytes Absolute, Manual 0.14 0.10 - 1.00 x10*3/uL    Eosinophils Absolute, Manual 0.00 0.00 - 0.70 x10*3/uL    Basophils Absolute, Manual 0.00 0.00 - 0.10 x10*3/uL    Atypical Lymphs Absolute, Manual 0.04 0.00 - 0.50 x10*3/uL    Metamyelocytes Absolute, Manual 0.04 0.00 - 0.00 x10*3/uL    Myelocytes Absolute, Manual 0.07 0.00 - 0.00 x10*3/uL    Total Cells Counted 100     Neutrophils Absolute, Manual 2.52 1.20 - 7.70 x10*3/uL    RBC Morphology See Below     Hypochromia Mild     Ovalocytes Few     Basophilic Stippling Present    CT head wo IV contrast    Result Date: 5/7/2024  Interpreted By:  Higinio Sanchez, STUDY: CT HEAD WO IV CONTRAST;  5/7/2024 5:01 pm   INDICATION: Signs/Symptoms:AMS.   COMPARISON: 04/26/2024   ACCESSION NUMBER(S): [id number]   ORDERING  CLINICIAN: GRAHAM HENDERSON   TECHNIQUE: Noncontrast axial CT scan of head was performed. Angled reformats in brain and bone windows were generated. The images were reviewed in bone, brain, blood and soft tissue windows.   FINDINGS: The ventricles, cisterns and sulci are prominent, consistent with mild diffuse volume loss. There are areas of nonspecific white matter hypodensity, which are probably age-related or microvascular in nature.   Gray-white differentiation is intact and there is no evidence of acute cortical infarct. Hypodensity in the left occipital lobe is unchanged and similar the prior exam. No mass, mass effect or midline shift is seen. There is no evidence of hemorrhage.   The visualized paranasal sinuses are clear.         No evidence of acute cortical infarct or intracranial hemorrhage.   Stable chronic changes.   MACRO: None   Signed by: Higinio Sanchez 5/7/2024 5:43 PM Dictation workstation:   [id number]    XR chest 1 view    Result Date: 5/7/2024  Interpreted By:  Nic Moseley, STUDY: XR CHEST 1 VIEW  5/7/2024 1:32 pm   INDICATION: Signs/Symptoms:weak   COMPARISON: 04/24/2024   ACCESSION NUMBER(S): [id number]   ORDERING CLINICIAN: GRAHAM HENDERSON   TECHNIQUE: A single AP portable radiograph of the chest was obtained.   FINDINGS: Mild diffuse interstitial infiltrates are seen bilaterally, and may represent edema and/or pneumonia. No pneumothorax is identified. The cardiac silhouette is mildly prominent, similar to prior studies.       Diffuse interstitial infiltrates bilaterally, as above. Clinical correlation and continued follow-up until clearing is recommended.   MACRO: None.   Signed by: Nic Moseley 5/7/2024 2:07 PM Dictation workstation:   [id number]    Electrocardiogram, 12-lead PRN ACS symptoms    Result Date: 5/7/2024  Normal sinus rhythm Voltage criteria for left ventricular hypertrophy Nonspecific ST and T wave abnormality Abnormal ECG When compared with ECG of 26-APR-2024 12:24, QT has  lengthened See ED provider note for full interpretation and clinical correlation   Scheduled medications   Medication Dose Route Frequency    [Held by provider] apixaban  2.5 mg oral q12h    [Held by provider] atorvastatin  40 mg oral Daily    cefTRIAXone  1 g intravenous Daily    [Held by provider] fluticasone-umeclidin-vilanter  1 puff inhalation Daily    [Held by provider] folic acid  1 mg oral Daily    heparin (porcine)  5,000 Units subcutaneous q8h    hydrocortisone sodium succinate  100 mg intravenous q8h    insulin lispro  0-10 Units subcutaneous q4h    ipratropium-albuteroL  3 mL nebulization TID    [Held by provider] levETIRAcetam  500 mg oral BID    levETIRAcetam  500 mg intravenous q12h    linezolid  600 mg intravenous q12h    [Held by provider] melatonin  5 mg oral Nightly    [Held by provider] multivitamin with minerals  1 tablet oral Daily    [Held by provider] mycophenolate  1,000 mg oral BID    [Held by provider] OLANZapine  5 mg oral BID    pantoprazole  40 mg intravenous Daily    [Held by provider] predniSONE  15 mg oral Daily    [Held by provider] thiamine  100 mg oral Daily     PRN medications   Medication    acetaminophen    dextrose    dextrose    glucagon    ipratropium-albuteroL    labetaloL    ondansetron    oxygen                       Nasim Coma Scale  Best Eye Response: To verbal stimuli  Best Verbal Response: Incomprehensible sounds  Best Motor Response: Withdraws to pain  [place] Coma Scale Score: 9                 I have personally reviewed the following imaging results CT head wo IV contrast    Result Date: 5/7/2024  Interpreted By:  Higinio Sanchez, STUDY: CT HEAD WO IV CONTRAST;  5/7/2024 5:01 pm   INDICATION: Signs/Symptoms:AMS.   COMPARISON: 04/26/2024   ACCESSION NUMBER(S): [id number]   ORDERING CLINICIAN: GRAHAM HENDERSON   TECHNIQUE: Noncontrast axial CT scan of head was performed. Angled reformats in brain and bone windows were generated. The images were reviewed in bone,  brain, blood and soft tissue windows.   FINDINGS: The ventricles, cisterns and sulci are prominent, consistent with mild diffuse volume loss. There are areas of nonspecific white matter hypodensity, which are probably age-related or microvascular in nature.   Gray-white differentiation is intact and there is no evidence of acute cortical infarct. Hypodensity in the left occipital lobe is unchanged and similar the prior exam. No mass, mass effect or midline shift is seen. There is no evidence of hemorrhage.   The visualized paranasal sinuses are clear.         No evidence of acute cortical infarct or intracranial hemorrhage.   Stable chronic changes.   MACRO: None   Signed by: Higinio Sanchez 5/7/2024 5:43 PM Dictation workstation:   [id number]    XR chest 1 view    Result Date: 5/7/2024  Interpreted By:  Nic Moseley, STUDY: XR CHEST 1 VIEW  5/7/2024 1:32 pm   INDICATION: Signs/Symptoms:weak   COMPARISON: 04/24/2024   ACCESSION NUMBER(S): [id number]   ORDERING CLINICIAN: GRAHAM HENDERSON   TECHNIQUE: A single AP portable radiograph of the chest was obtained.   FINDINGS: Mild diffuse interstitial infiltrates are seen bilaterally, and may represent edema and/or pneumonia. No pneumothorax is identified. The cardiac silhouette is mildly prominent, similar to prior studies.       Diffuse interstitial infiltrates bilaterally, as above. Clinical correlation and continued follow-up until clearing is recommended.   MACRO: None.   Signed by: Nic Moseley 5/7/2024 2:07 PM Dictation workstation:   [id number]    Electrocardiogram, 12-lead PRN ACS symptoms    Result Date: 5/7/2024  Normal sinus rhythm Voltage criteria for left ventricular hypertrophy Nonspecific ST and T wave abnormality Abnormal ECG When compared with ECG of 26-APR-2024 12:24, QT has lengthened See ED provider note for full interpretation and clinical correlation    ECG 12 lead    Result Date: 4/30/2024  Normal sinus rhythm Possible Left atrial enlargement Left  ventricular hypertrophy with repolarization abnormality Abnormal ECG When compared with ECG of 20-MAR-2024 00:59, Vent. rate has increased BY  37 BPM Confirmed by Evaristo Moran ([id number]) on 4/30/2024 1:13:25 PM    US right upper quadrant    Result Date: 4/27/2024  Interpreted By:  Graciela Blevins, STUDY: US RIGHT UPPER QUADRANT;  4/27/2024 5:24 pm   INDICATION: Signs/Symptoms:looking for islet tumor.   COMPARISON: Correlation with noncontrast CT abdomen pelvis 04/25/2024   ACCESSION NUMBER(S): [id number]   ORDERING CLINICIAN: TU ZAMARRIPA   TECHNIQUE: Grayscale and color Doppler sonographic imaging of the right upper quadrant.   FINDINGS: LIVER: Normal size. Normal echogenicity, and echotexture. No focal abnormalities.   BILE DUCTS: No intrahepatic or extrahepatic bile duct dilatation. Extrahepatic bile duct = 5 mm.   GALLBLADDER: Status post cholecystectomy.   PANCREAS: Normal head and body. Limited evaluation of the pancreatic tail due to bowel gas. No pancreatic mass is seen. Pancreatic duct is within normal limits in size.   RIGHT KIDNEY: Normal size, no hydronephrosis.   PERITONEUM: No upper abdominal ascites.       Unremarkable right upper quadrant ultrasound.   No visualized pancreatic mass. Please note that ultrasound is limited in sensitivity for pancreatic masses and further evaluation with nonemergent pancreas protocol MRI may be considered if clinically indicated.   MACRO: None   Signed by: Graciela Blevins 4/27/2024 6:47 PM Dictation workstation:   [id number]    CT head wo IV contrast    Result Date: 4/26/2024  Interpreted By:  Graciela Blevins, STUDY: CT HEAD WO IV CONTRAST;  4/26/2024 5:14 pm   INDICATION: Signs/Symptoms:Altered mental status.   COMPARISON: 03/20/2024   ACCESSION NUMBER(S): [id number]   ORDERING CLINICIAN: TU ZAMARRIPA   TECHNIQUE: Axial noncontrast CT images of the head.   FINDINGS: BRAIN PARENCHYMA: Stable encephalomalacia in the left occipital lobe. Gray-white matter interfaces are  otherwisepreserved. Calcifications are in the basal ganglia.   deep and periventricular white matter hypodensities are nonspecific, but favored to represent chronic small vessel ischemic changes.  No mass effect or midline shift.   HEMORRHAGE: No acute intracranial hemorrhage. VENTRICLES and EXTRA-AXIAL SPACES: The ventricles and sulci are within normal limits in size for brain volume. No abnormal extraaxial fluid collection. EXTRACRANIAL SOFT TISSUES: Within normal limits. PARANASAL SINUSES/MASTOIDS:  The visualized paranasal sinuses and mastoid air cells are aerated. CALVARIUM: No depressed skull fracture. No destructive osseous lesion.   OTHER FINDINGS: None.       No acute intracranial abnormality.   Stable chronic findings as above.   MACRO: None   Signed by: Graciela Blevins 4/26/2024 6:56 PM Dictation workstation:   [id number]    XR elbow left 3+ views    Result Date: 4/26/2024  Interpreted By:  Jose A Anderson, STUDY: XR ELBOW LEFT 3+ VIEWS; ;  4/26/2024 3:02 pm   INDICATION: Signs/Symptoms:pain.   COMPARISON: None.   ACCESSION NUMBER(S): [id number]   ORDERING CLINICIAN: VINCENT VARELA   FINDINGS: Small anterior and posterior effusions. No definite fracture visualized. No significant soft tissue swelling. No radiopaque foreign body. Mild degenerative changes.       Small effusions without visualized fracture. Mild degenerative changes of the elbow.     MACRO: None   Signed by: Jose A Anderson 4/26/2024 3:41 PM Dictation workstation:   [id number]    CT elbow left wo IV contrast    Result Date: 4/25/2024  Interpreted By:  Graciela Blevins, STUDY: CT ELBOW LEFT WO IV CONTRAST; ;  4/25/2024 8:31 pm   INDICATION: Signs/Symptoms:Self mutilation of antecubital fossa, joint tender to touch, looking for infection.   COMPARISON: None.   ACCESSION NUMBER(S): [id number]   ORDERING CLINICIAN: TU ZAMARRIPA   TECHNIQUE: Noncontrast CT images of the left elbow with axial, sagittal and coronal reconstructed  images.   FINDINGS: No acute fracture or malalignment. 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 No erosions or destructive changes. There is a small elbow joint effusion. Otherwise, soft tissues are unremarkable. No soft tissue gas or radiopaque foreign body.       Nonspecific small elbow joint effusion. If there is clinical concern for septic arthritis, joint aspiration is recommended.   Otherwise, soft tissues are unremarkable.   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     MACRO: None   Signed by: Graciela Blevins 4/25/2024 9:54 PM Dictation workstation:   [id number]    CT abdomen pelvis wo IV contrast    Result Date: 4/25/2024  Interpreted By:  Graciela Blevins, STUDY: CT ABDOMEN PELVIS WO IV CONTRAST;  4/25/2024 8:31 pm   INDICATION: Signs/Symptoms:3 pt drop in hgb, AMS, worsening encephalopathy, hx of secondary adrenal insuficiency and hypoglycemia.   COMPARISON: 03/20/2024   ACCESSION NUMBER(S): [id number]   ORDERING CLINICIAN: TU ZAMARRIPA   TECHNIQUE: Axial noncontrast CT images of the abdomen and pelvis with coronal and sagittal reconstructed images.   FINDINGS: LOWER CHEST: New bilateral lower lobe airspace opacities. Cardiomegaly and coronary artery calcifications noted. BONES: No acute osseous abnormality. Diffuse degenerative disc changes and lumbar facet arthropathy. Right total hip arthroplasty. Severe left hip osteoarthrosis. ABDOMINAL WALL: Within normal limits.   ABDOMEN: Lack of intravenous contrast limits evaluation of vessels and solid organs. LIVER: Within normal limits. BILE DUCTS: No biliary dilatation. GALLBLADDER: Cholecystectomy. PANCREAS: No peripancreatic inflammatory stranding or duct dilatation. SPLEEN: Within normal limits. ADRENALS: Within normal limits.   KIDNEYS, URETERS, URINARY BLADDER: No hydronephrosis. 2 mm nonobstructing right renal calculus. Limited evaluation of the distal ureters due to streak artifact. No hydroureter. Evaluation of the urinary bladder due to streak artifact.    VESSELS: No aortic aneurysm. RETROPERITONEUM: No pathologically enlarged lymph nodes.   PELVIS:   REPRODUCTIVE ORGANS: No abnormality, given limitations of the noncontrast CT.   BOWEL: The stomach is mildly distended. No dilated small bowel. Colonic diverticulosis without acute diverticulitis. Normal appendix. PERITONEUM: No ascites or free air, no fluid collection.       No acute abdominal or pelvic process.   Nonspecific bilateral lower lobe airspace opacities. Please correlate clinically to exclude pneumonia.   MACRO: None   Signed by: Graciela Blevins 4/25/2024 9:51 PM Dictation workstation:   [id number]    Bedside Peripheral IV Imaging    Result Date: 4/25/2024  These images are not reportable by radiology and will not be interpreted by  Radiologists.    Lower extremity venous duplex bilateral    Result Date: 4/25/2024            [facility] [address] [place] [address]     Tel [phone] Fax [phone]  Vascular Lab Report  VASC US LOWER EXTREMITY VENOUS DUPLEX BILATERAL Patient Name:      LISBET ZARAGOZA NENITA      Reading Physician:  [id number] Enzo Ashley MD, RPVI Study Date:        4/25/2024             Ordering Provider:  [id number] HILDA JUAREZ MRN/PID:           [id number]              Fellow: Accession#:        [id number]          Technologist:       Beth Hannah RVT, RDMS Date of Birth/Age: 1960 / 63 years Technologist 2: Gender:            F                     Encounter#:         [id number] Admission Status:  Inpatient             Location Performed: [facility]  Diagnosis/ICD: Other specified soft tissue disorders-M79.89 Indication:    Limb swelling CPT Codes:     03431 Peripheral venous duplex scan for DVT complete   Pertinent History: COPD, Anticoagulation, AAA and LE Edema. Afib.  CONCLUSIONS: Right Lower Venous: No evidence of acute deep vein thrombus visualized in the right lower extremity. Left Lower Venous: No evidence of acute deep vein thrombus visualized in the left lower extremity. Additional Findings: Limited images due to patient's inability to tolerate compressions.  Comparison: Compared with study from 1/15/2024, no significant change.No evidence of DVT.  Imaging & Doppler Findings:  Right                 Compressible Thrombus        Flow Distal External Iliac                None   Spontaneous/Phasic CFV                       Yes        None   Spontaneous/Phasic PFV                       Yes        None FV Proximal               Yes        None   Spontaneous/Phasic FV Mid                    Yes        None FV Distal                 Yes        None Popliteal                 Yes        None   Spontaneous/Phasic Peroneal                  Yes        None PTV                       Yes        None  Left                  Compress Thrombus        Flow Distal External Iliac            None   Spontaneous/Phasic CFV                     Yes      None   Spontaneous/Phasic PFV                     Yes      None FV Proximal             Yes      None   Spontaneous/Phasic FV Mid                  Yes      None FV Distal               Yes      None Popliteal               Yes      None   Spontaneous/Phasic Peroneal                Yes      None PTV                     Yes      None  [id number] Enzo Ashley MD, RPAUNDREA Electronically signed by [id number] Enzo Ashley MD, RPAUNDREA on 4/25/2024 at 1:07:18 PM  ** Final **     XR chest 1 view    Result Date: 4/24/2024  Interpreted By:  Mitesh Khan, STUDY: XR CHEST 1 VIEW;  4/24/2024 5:41 pm   INDICATION: Signs/Symptoms:cough.   COMPARISON: 03/19/2024   ACCESSION NUMBER(S): [id number]   ORDERING CLINICIAN: ENEIDA GRIER   FINDINGS:         CARDIOMEDIASTINAL SILHOUETTE: Cardiomediastinal silhouette  is enlarged.   LUNGS: There is interval appearance of a right upper lobe patchy airspace disease concerning for pneumonia. The left lung is clear. There is no effusion   ABDOMEN: No remarkable upper abdominal findings.   BONES: No acute osseous changes.       1.  New right upper lobe airspace disease compatible with pneumonia in the proper clinical setting. Radiographic follow-up to resolution in 3-4 weeks is advised.       MACRO: None   Signed by: Mitesh Khan 4/24/2024 5:50 PM Dictation workstation:   [id number]  .      Assessment/Plan   Principal Problem:    Disorientation      Impression:  Change in MS, may be metabolic vs. breakthrough seizure vs. UTI  History of CVA (1990), on Eliquis for Afib  Current UTI    Plan:   Seizure precautions  Continue with cvEEG  MRI brain with and without contrast once EEG complete  Fluid and electrolyte resuscitation      I spent 60 minutes in the professional and overall care of this patient.      Bailee Woodward, APRN-CNP

## 2024-05-08 NOTE — PROGRESS NOTES
"Vancomycin Dosing by Pharmacy- FOLLOW UP    Bing Holliday is a 63 y.o. year old female who Pharmacy has been consulted for vancomycin dosing for pneumonia. Based on the patient's indication and renal status this patient is being dosed based on a goal AUC of 400-600.     Renal function is currently stable.    Current vancomycin dose: 1250 mg given every 24 hours    Estimated vancomycin AUC on current dose: 590 mg/L.hr     Visit Vitals  /74   Pulse 72   Temp 35.3 °C (95.5 °F) (Temporal)   Resp 20        Lab Results   Component Value Date    CREATININE 1.60 (H) 05/08/2024    CREATININE 1.53 (H) 05/07/2024    CREATININE 1.62 (H) 04/30/2024    CREATININE 1.60 (H) 04/29/2024        Patient weight is No results found for: \"PTWEIGHT\"    No results found for: \"CULTURE\"     I/O last 3 completed shifts:  In: 1446.7 (15.5 mL/kg) [I.V.:796.7 (8.5 mL/kg); IV Piggyback:650]  Out: 650 (6.9 mL/kg) [Urine:650 (0.2 mL/kg/hr)]  Weight: 93.6 kg   [unfilled]    Lab Results   Component Value Date    PATIENTTEMP  05/07/2024      Comment:      NOTE: Patient Results are Not Corrected for Temperature    PATIENTTEMP  05/07/2024      Comment:      NOTE: Patient Results are Not Corrected for Temperature    PATIENTTEMP  04/24/2024      Comment:      NOTE: Patient Results are Not Corrected for Temperature        Assessment/Plan    Within goal AUC range. Continue current vancomycin regimen.    This dosing regimen is predicted by [employer] to result in the following pharmacokinetic parameters:  Loading dose: N/A  Regimen: 1250 mg IV every 24 hours.  Start time: 21:00 on 05/08/2024  Exposure target: AUC24 (range)400-600 mg/L.hr   AUC24,ss: 590 mg/L.hr  Probability of AUC24 > 400: 97 %  Ctrough,ss: 15.8 mg/L  Probability of Ctrough,ss > 20: 22 %  Probability of nephrotoxicity (Lodise YUDY 2009): 11 %    The next level will be obtained on 05/09/24 at 0500. May be obtained sooner if clinically indicated.   Will continue to monitor renal " function daily while on vancomycin and order serum creatinine at least every 48 hours if not already ordered.  Follow for continued vancomycin needs, clinical response, and signs/symptoms of toxicity.       Kiya Phillips, [facility]

## 2024-05-08 NOTE — TELEPHONE ENCOUNTER
The notes still were not enough.     Per hossein, insruance requires that notes include verbiage of patient utilizing CGM:

## 2024-05-08 NOTE — CONSULTS
"Vancomycin Dosing by Pharmacy- INITIAL    Bing Holliday is a 63 y.o. year old female who Pharmacy has been consulted for vancomycin dosing for pneumonia. Based on the patient's indication and renal status this patient will be dosed based on a goal AUC of 400-600.     Renal function is currently scr=1.53 cl=43.9 ml/min  Visit Vitals  /72 (BP Location: Left arm, Patient Position: Lying)   Pulse 54   Temp 35.7 °C (96.3 °F) (Temporal)   Resp 16        Lab Results   Component Value Date    CREATININE 1.53 (H) 05/07/2024    CREATININE 1.62 (H) 04/30/2024    CREATININE 1.60 (H) 04/29/2024    CREATININE 1.70 (H) 04/28/2024        Patient weight is 93.6kg    No results found for: \"CULTURE\"     No intake/output data recorded.  [unfilled]    Lab Results   Component Value Date    PATIENTTEMP  05/07/2024      Comment:      NOTE: Patient Results are Not Corrected for Temperature    PATIENTTEMP  05/07/2024      Comment:      NOTE: Patient Results are Not Corrected for Temperature    PATIENTTEMP  04/24/2024      Comment:      NOTE: Patient Results are Not Corrected for Temperature          Assessment/Plan     Patient will not be given a loading dose.  Will initiate vancomycin maintenance,  1250 mg every 24 hours.    This dosing regimen is predicted by [employer] to result in the following pharmacokinetic parameters:    Regimen: 1250 mg IV every 24 hours.  Start time: 20:11 on 05/07/2024  Exposure target: AUC24 (range)400-600 mg/L.hr   AUC24,ss: 484 mg/L.hr  Probability of AUC24 > 400: 72 %  Ctrough,ss: 15.1 mg/L  Probability of Ctrough,ss > 20: 24 %  Probability of nephrotoxicity (Lodise YUDY 2009): 10 %    Follow-up level will be ordered on 5/8 0000 and 5/8 1st am  unless clinically indicated sooner.  Will continue to monitor renal function daily while on vancomycin and order serum creatinine at least every 48 hours if not already ordered.  Follow for continued vancomycin needs, clinical response, and signs/symptoms of " toxicity.       Maria C Escoto, PharmD

## 2024-05-08 NOTE — PROGRESS NOTES
Chart review complete.     Previously was admitted 4/24-4/30 due to hypoglycemia, hallucinations, hypothermia, and altered mental status. Was also treated for community acquired pneumonia and then discharged back to [facility].    Patient currently admitted to [facility]. Admission date 5/7. Was taken in from [facility] due to altered mental status. Reports state patient was hypoglycemic at SNF, then given Glucagon and EMS was called and found patient to be hyperglycemic. In ED patient was found to be hypothermic, leukopenic, hyponatremic. Was admitted to ICU where she currently remains. As of today notes report hypothermia improving. Hypoglycemia resolved. Creatinine stable. Urinalysis consistent with urinary tract infection. Is receiving antibiotic therapy.     Collaborative care performed between this nurse and pharmacist - Nesha Whitman PharmD. See her note for further information regarding CGM issues.     This nurse will follow up with patient after discharge.

## 2024-05-08 NOTE — PROGRESS NOTES
Social Work Note Per chart review, pt admitted from LTC at [facility][note 2] and is a bedhold.  Sent clinical information in [facility] Port to [facility][note 2] who confirms.  TOI Esteban

## 2024-05-08 NOTE — PROGRESS NOTES
Vancomycin Dosing by Pharmacy- Cessation of Therapy    Consult to pharmacy for vancomycin dosing has been discontinued by the prescriber, pharmacy will sign off at this time.    Please call pharmacy if there are further questions or re-enter a consult if vancomycin is resumed.     Elvira Murphy, PharmD

## 2024-05-08 NOTE — CONSULTS
Inpatient consult to Endocrinology  Consult performed by: Mela Banuelos MD  Consult ordered by: Hay Mckeon,           Assessment/Plan      ADRENAL INSUFFICIENCY   The patient was admitted with symptoms of adrenal insufficiency she has been on prednisone for her adrenal insufficiency due to chronic steroid use for lupus.  Her cortisol level at 1 time was quite low at 2.3  She was started on hydrocortisone which is appropriate and her condition will be continued at a later date could be adjusted down when she is worse stable to be discharged to oral      Reason For Consult  Adrenal insufficiency     History Of Present Illness  Bing Holliday is a 63 y.o. female with  has a past medical history of Abnormal kidney function (04/04/2023), Acute headache (03/10/2024), Acute low back pain (10/30/2023), Acute upper respiratory infection, unspecified (03/04/2020), Acute upper respiratory infection, unspecified (09/30/2015), Arthritis, Atypical facial pain (03/10/2024), Body mass index (BMI) 23.0-23.9, adult (10/15/2021), Body mass index (BMI) 33.0-33.9, adult (03/04/2020), Cardiomegaly (08/27/2013), Chest pain (06/10/2022), Chronic kidney disease, stage 3 unspecified (Multi) (07/02/2013), Confusional state (03/10/2024), Contact with and (suspected) exposure to covid-19 (04/04/2023), Delirium (03/10/2024), Disease of pericardium, unspecified ([facility]) (07/02/2013), Encounter for follow-up examination after completed treatment for conditions other than malignant neoplasm (10/06/2022), Generalized contraction of visual field, right eye (01/29/2015), Hearing loss (03/10/2024), History of cataract (03/10/2024), History of thrombocytopenia (03/10/2024), Homonymous bilateral field defects, right side (04/29/2016), Hypertensive chronic kidney disease with stage 1 through stage 4 chronic kidney disease, or unspecified chronic kidney disease (07/02/2013), Laceration without foreign body, left foot, initial encounter  (07/03/2018), Localized edema (03/10/2024), Mass of skin (03/10/2024), Migraine with aura, not intractable, without status migrainosus (10/24/2022), Open wound (03/10/2024), Open wound of left foot (03/10/2024), Other conditions influencing health status (07/02/2013), Other conditions influencing health status (07/02/2013), Other conditions influencing health status (07/02/2013), Other conditions influencing health status (05/22/2015), Other conditions influencing health status (10/24/2022), Other conditions influencing health status (03/14/2022), Other long term (current) drug therapy (10/24/2022), Overweight with body mass index (BMI) 25.0-29.9 (03/10/2024), Pain of toe (03/10/2024), Personal history of COVID-19 (04/04/2023), Personal history of diseases of the blood and blood-forming organs and certain disorders involving the immune mechanism (07/02/2013), Personal history of diseases of the skin and subcutaneous tissue (08/11/2015), Personal history of nephrotic syndrome (07/02/2013), Personal history of other diseases of the circulatory system (08/27/2013), Personal history of other diseases of the nervous system and sense organs, Personal history of other diseases of the respiratory system, Personal history of other infectious and parasitic diseases (07/02/2013), Personal history of other specified conditions, Personal history of other specified conditions (08/27/2013), Posterior epistaxis (03/10/2024), Puckering of macula, right eye (10/24/2022), Raynaud's syndrome without gangrene (07/02/2013), Sinusitis (03/10/2024), Systemic lupus erythematosus, unspecified (Multi) (07/24/2015), Systemic lupus erythematosus, unspecified (Multi) (07/24/2015), Systemic lupus erythematosus, unspecified (Multi) (07/24/2015), 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) (07/02/2013), Type 2 diabetes mellitus with mild nonproliferative diabetic retinopathy without macular edema, left eye (Multi) (07/27/2015), Type  2 diabetes mellitus with mild nonproliferative diabetic retinopathy without macular edema, unspecified eye (Multi) (07/24/2015), Type 2 diabetes mellitus with proliferative diabetic retinopathy without macular edema, right eye (Multi) (07/27/2015), Type 2 diabetes mellitus with proliferative diabetic retinopathy without macular edema, unspecified eye (Multi) (07/24/2015), Unspecified acute and subacute iridocyclitis (07/24/2015), and Unspecified open wound, left foot, sequela (07/03/2018). presenting with adrenal insufficiency -   Per HPI.  Bing Holliday is a 63 y.o. 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  History somewhat limited as the patient, while she awakens and is able to answer simple questions, is unable to offer any meaningful history.     Extensive chart review undertaken.  Patient with multiple presentations of the same.  Admitted from 1/17 - 1-28 to [facility] for similar presentation.  Underwent extensive workup there at that time, was treated with stress to steroids, rheumatology consultation who do not believe that she was in acute lupus flare, endocrinology consultation, as well as neurology consultation.  During that admission there was concern for potential seizure-like activity, the patient underwent a video EEG, was found to be in status, and was loaded with Keppra and continued on maintenance dosing.  She ultimately was discharged to skilled nursing facility for ongoing care.  At the time of discharge her noted prednisone dosing was 15 mg daily.     She was then readmitted to the hospital 3/9 through 3/14 with hypothermia,  hyponatremia, altered mental status, and hypoglycemia.  She again underwent an extensive workup, was seen by endocrinology, manage on stress dose steroids, no infectious etiology elucidated despite a very exhaustive search, she improved, and at that time she was discharged on what is documented as 20 mg of prednisone daily.     She then was readmitted to the hospital 3/19 through 3/26 with almost identical presentation of hypothermia, hyponatremia, hypoglycemia.  She was treated with stress dose steroids, underwent an MRI of the brain which was unrevealing, she returned to her baseline, and was discharged back to her skilled nursing facility.  At that time she was discharged home and is reported to be 20 mg of prednisone daily.     She then was readmitted to the hospital 4/24 to 4/30 for exactly the same presentation including hypoglycemia, hallucinations, hypothermia, and altered mental status.  And was treated in a very similar fashion including stress dose steroids.  She underwent an infectious workup, was treated for community-acquired pneumonia, and ultimately improved.  She was discharged with what was reported to be 20 mg of prednisone daily.     She presents today with hyperglycemia.  She received glucagon prior to arrival.  In the emergency department she was found to be leukopenic, hyponatremic, with a hemoglobin of 7.8 which is near her baseline hemoglobin.  She was given 1 g of ceftriaxone, 100 mg of hydrocortisone, and placed on a Allan hugger.  She was referred to the ICU for admission given her multiple medical comorbidities.         Past Medical History  She has a past medical history of Abnormal kidney function (04/04/2023), Acute headache (03/10/2024), Acute low back pain (10/30/2023), Acute upper respiratory infection, unspecified (03/04/2020), Acute upper respiratory infection, unspecified (09/30/2015), Arthritis, Atypical facial pain (03/10/2024), Body mass index (BMI) 23.0-23.9, adult  (10/15/2021), Body mass index (BMI) 33.0-33.9, adult (03/04/2020), Cardiomegaly (08/27/2013), Chest pain (06/10/2022), Chronic kidney disease, stage 3 unspecified (Multi) (07/02/2013), Confusional state (03/10/2024), Contact with and (suspected) exposure to covid-19 (04/04/2023), Delirium (03/10/2024), Disease of pericardium, unspecified ([facility]) (07/02/2013), Encounter for follow-up examination after completed treatment for conditions other than malignant neoplasm (10/06/2022), Generalized contraction of visual field, right eye (01/29/2015), Hearing loss (03/10/2024), History of cataract (03/10/2024), History of thrombocytopenia (03/10/2024), Homonymous bilateral field defects, right side (04/29/2016), Hypertensive chronic kidney disease with stage 1 through stage 4 chronic kidney disease, or unspecified chronic kidney disease (07/02/2013), Laceration without foreign body, left foot, initial encounter (07/03/2018), Localized edema (03/10/2024), Mass of skin (03/10/2024), Migraine with aura, not intractable, without status migrainosus (10/24/2022), Open wound (03/10/2024), Open wound of left foot (03/10/2024), Other conditions influencing health status (07/02/2013), Other conditions influencing health status (07/02/2013), Other conditions influencing health status (07/02/2013), Other conditions influencing health status (05/22/2015), Other conditions influencing health status (10/24/2022), Other conditions influencing health status (03/14/2022), Other long term (current) drug therapy (10/24/2022), Overweight with body mass index (BMI) 25.0-29.9 (03/10/2024), Pain of toe (03/10/2024), Personal history of COVID-19 (04/04/2023), Personal history of diseases of the blood and blood-forming organs and certain disorders involving the immune mechanism (07/02/2013), Personal history of diseases of the skin and subcutaneous tissue (08/11/2015), Personal history of nephrotic syndrome (07/02/2013), Personal history of other  diseases of the circulatory system (08/27/2013), Personal history of other diseases of the nervous system and sense organs, Personal history of other diseases of the respiratory system, Personal history of other infectious and parasitic diseases (07/02/2013), Personal history of other specified conditions, Personal history of other specified conditions (08/27/2013), Posterior epistaxis (03/10/2024), Puckering of macula, right eye (10/24/2022), Raynaud's syndrome without gangrene (07/02/2013), Sinusitis (03/10/2024), Systemic lupus erythematosus, unspecified (Multi) (07/24/2015), Systemic lupus erythematosus, unspecified (Multi) (07/24/2015), Systemic lupus erythematosus, unspecified (Multi) (07/24/2015), 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) (07/02/2013), Type 2 diabetes mellitus with mild nonproliferative diabetic retinopathy without macular edema, left eye (Multi) (07/27/2015), Type 2 diabetes mellitus with mild nonproliferative diabetic retinopathy without macular edema, unspecified eye (Multi) (07/24/2015), Type 2 diabetes mellitus with proliferative diabetic retinopathy without macular edema, right eye (Multi) (07/27/2015), Type 2 diabetes mellitus with proliferative diabetic retinopathy without macular edema, unspecified eye (Multi) (07/24/2015), Unspecified acute and subacute iridocyclitis (07/24/2015), and Unspecified open wound, left foot, sequela (07/03/2018).    Surgical History  She has a past surgical history that includes Eye surgery (03/06/2015); Total hip arthroplasty (01/29/2015); Cholecystectomy (01/29/2015); Other surgical history (01/29/2015); Other surgical history (01/29/2015); Ankle surgery (01/29/2015); Foot surgery (01/29/2015); MR angio head wo IV contrast (7/26/2013); MR angio neck wo IV contrast (7/26/2013); CT guided percutaneous biopsy bone deep (5/4/2021); MR angio head wo IV contrast (9/17/2021); MR angio neck wo IV contrast (9/17/2021); MR angio neck wo IV  contrast (3/25/2023); and MR angio head wo IV contrast (3/25/2023).     Social History  She reports that she has never smoked. She has never been exposed to tobacco smoke. She has never used smokeless tobacco. She reports that she does not currently use alcohol. She reports that she does not use drugs.    Family History  Family History   Problem Relation Name Age of Onset    Other (RENAL DISEASE) Mother          END STAGE    Other (CARDIAC DISORDER) Mother      Cataracts Mother      Stroke Mother      Diabetes Mother      Kidney disease Mother      Lupus Mother      Other (CARDIAC DISORDER) Father      COPD Father      Glaucoma Father      Hypertension Father      Sleep apnea Father      Other (CARDIAC DISORDER) Sister      Depression Sister      Kidney disease Sister      Sickle cell trait Sister      Sleep apnea Daughter          Allergies  Ace inhibitors, Hydroxychloroquine, Lisinopril, Penicillins, and Sulfa (sulfonamide antibiotics)    Review of Systems  Per HPI    Past Medical History:   Diagnosis Date    Abnormal kidney function 04/04/2023    Acute headache 03/10/2024    Acute low back pain 10/30/2023    Acute upper respiratory infection, unspecified 03/04/2020    Acute URI    Acute upper respiratory infection, unspecified 09/30/2015    URTI (acute upper respiratory infection)    Arthritis     Atypical facial pain 03/10/2024    Body mass index (BMI) 23.0-23.9, adult 10/15/2021    BMI 23.0-23.9, adult    Body mass index (BMI) 33.0-33.9, adult 03/04/2020    BMI 33.0-33.9,adult    Cardiomegaly 08/27/2013    Left ventricular hypertrophy    Chest pain 06/10/2022    Comment on above: Added by Problem List Migration; 2013-3-12; Moved to Suppressed Nov 25 2013 9:16PM; Comment on above: Added by Problem List Migration; 2013-3-12; Moved to Suppressed Nov 25 2013 9:16PM;    Chronic kidney disease, stage 3 unspecified (Multi) 07/02/2013    Chronic kidney disease, stage III (moderate)    Confusional state 03/10/2024     Contact with and (suspected) exposure to covid-19 04/04/2023    Delirium 03/10/2024    Disease of pericardium, unspecified ([facility]) 07/02/2013    Pericardial disease    Encounter for follow-up examination after completed treatment for conditions other than malignant neoplasm 10/06/2022    Hospital discharge follow-up    Generalized contraction of visual field, right eye 01/29/2015    Generalized contraction of visual field of right eye    Hearing loss 03/10/2024    History of cataract 03/10/2024    History of thrombocytopenia 03/10/2024    Comment on above: Added by Problem List Migration; 2013-7-2;    Homonymous bilateral field defects, right side 04/29/2016    Homonymous bilateral field defects of right side    Hypertensive chronic kidney disease with stage 1 through stage 4 chronic kidney disease, or unspecified chronic kidney disease 07/02/2013    Nephrosclerosis    Laceration without foreign body, left foot, initial encounter 07/03/2018    Foot laceration, left, initial encounter    Localized edema 03/10/2024    Mass of skin 03/10/2024    Migraine with aura, not intractable, without status migrainosus 10/24/2022    Ocular migraine    Open wound 03/10/2024    Open wound of left foot 03/10/2024    Other conditions influencing health status 07/02/2013    Chronic Glomerulonephritis In Diseases Classified Elsewhere    Other conditions influencing health status 07/02/2013    Progressive Familial Myoclonic Epilepsy    Other conditions influencing health status 07/02/2013    Protein S Deficiency    Other conditions influencing health status 05/22/2015    Familial Combined Hyperlipidemia    Other conditions influencing health status 10/24/2022    IDDM (insulin dependent diabetes mellitus)    Other conditions influencing health status 03/14/2022    Diabetes mellitus, insulin dependent (IDDM), uncontrolled    Other long term (current) drug therapy 10/24/2022    Long-term use of Plaquenil    Overweight with body mass  index (BMI) 25.0-29.9 03/10/2024    Pain of toe 03/10/2024    Personal history of COVID-19 04/04/2023    Personal history of diseases of the blood and blood-forming organs and certain disorders involving the immune mechanism 07/02/2013    History of thrombocytopenia    Personal history of diseases of the skin and subcutaneous tissue 08/11/2015    History of foot ulcer    Personal history of nephrotic syndrome 07/02/2013    History of nephrotic syndrome    Personal history of other diseases of the circulatory system 08/27/2013    History of sinus tachycardia    Personal history of other diseases of the nervous system and sense organs     History of cataract    Personal history of other diseases of the respiratory system     History of bronchitis    Personal history of other infectious and parasitic diseases 07/02/2013    History of hepatitis    Personal history of other specified conditions     History of shortness of breath    Personal history of other specified conditions 08/27/2013    History of edema    Posterior epistaxis 03/10/2024    Puckering of macula, right eye 10/24/2022    ERM OD (epiretinal membrane, right eye)    Raynaud's syndrome without gangrene 07/02/2013    Raynaud's disease    Sinusitis 03/10/2024    Systemic lupus erythematosus, unspecified (Multi) 07/24/2015    SLE (systemic lupus erythematosus)    Systemic lupus erythematosus, unspecified (Multi) 07/24/2015    SLE (systemic lupus erythematosus)    Systemic lupus erythematosus, unspecified (Multi) 07/24/2015    Systemic lupus    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) 07/02/2013    Type 2 diabetes with nephropathy    Type 2 diabetes mellitus with mild nonproliferative diabetic retinopathy without macular edema, left eye (Multi) 07/27/2015    Non-proliferative diabetic retinopathy, left eye    Type 2 diabetes mellitus with mild nonproliferative diabetic retinopathy without macular edema, unspecified eye (Multi) 07/24/2015     Mild non proliferative diabetic retinopathy    Type 2 diabetes mellitus with proliferative diabetic retinopathy without macular edema, right eye (Multi) 07/27/2015    Proliferative diabetic retinopathy of right eye    Type 2 diabetes mellitus with proliferative diabetic retinopathy without macular edema, unspecified eye (Multi) 07/24/2015    Diabetic proliferative retinopathy    Unspecified acute and subacute iridocyclitis 07/24/2015    Acute iritis, right eye    Unspecified open wound, left foot, sequela 07/03/2018    Wound, open, foot, left, sequela       Past Surgical History:   Procedure Laterality Date    ANKLE SURGERY  01/29/2015    Ankle Surgery    CHOLECYSTECTOMY  01/29/2015    Cholecystectomy    CT GUIDED PERCUTANEOUS BIOPSY BONE DEEP  5/4/2021    CT GUIDED PERCUTANEOUS BIOPSY BONE DEEP 5/4/2021 [facility]ICAL [facility]GAC[facility]    EYE SURGERY  03/06/2015    Eye Surgery    FOOT SURGERY  01/29/2015    Foot Surgery    MR HEAD ANGIO WO IV CONTRAST  7/26/2013    MR HEAD ANGIO WO IV CONTRAST 7/26/2013 [facility] CL[facility]ICAL [facility]GAC[facility]    MR HEAD ANGIO WO IV CONTRAST  9/17/2021    MR HEAD ANGIO WO IV CONTRAST 9/17/2021 [facility]    MR HEAD ANGIO WO IV CONTRAST  3/25/2023    MR HEAD ANGIO WO IV CONTRAST [facility]    MR NECK ANGIO WO IV CONTRAST  7/26/2013    MR NECK ANGIO WO IV CONTRAST 7/26/2013 [facility]ICAL [facility]GAC[facility]    MR NECK ANGIO WO IV CONTRAST  9/17/2021    MR NECK ANGIO WO IV CONTRAST 9/17/2021 [facility]    MR NECK ANGIO WO IV CONTRAST  3/25/2023    MR NECK ANGIO WO IV CONTRAST [facility]    OTHER SURGICAL HISTORY  01/29/2015    Creation Of Pericardial Window    OTHER SURGICAL HISTORY  01/29/2015    Quadricepsplasty    TOTAL HIP ARTHROPLASTY  01/29/2015    Hip Replacement       Social History     Socioeconomic History    Marital status:      Spouse name: Not on file    Number of children: Not on file    Years of education: Not on file    Highest education level: Not on file   Occupational History    Not on  file   Tobacco Use    Smoking status: Never     Passive exposure: Never    Smokeless tobacco: Never   Vaping Use    Vaping status: Never Used   Substance and Sexual Activity    Alcohol use: Not Currently     Comment: RARE    Drug use: Never    Sexual activity: Defer   Other Topics Concern    Not on file   Social History Narrative    Not on file     Social Determinants of Health     Financial Resource Strain: Low Risk  (4/27/2024)    Overall Financial Resource Strain (CARDIA)     Difficulty of Paying Living Expenses: Not hard at all   Food Insecurity: Patient Unable To Answer (3/9/2024)    Hunger Vital Sign     Worried About Running Out of Food in the Last Year: Patient unable to answer     Ran Out of Food in the Last Year: Patient unable to answer   Transportation Needs: No Transportation Needs (4/27/2024)    PRAPARE - Transportation     Lack of Transportation (Medical): No     Lack of Transportation (Non-Medical): No   Physical Activity: Patient Declined (1/15/2024)    Exercise Vital Sign     Days of Exercise per Week: Patient declined     Minutes of Exercise per Session: Patient declined   Stress: No Stress Concern Present (1/15/2024)    [employer] - Occupational Stress Questionnaire     Feeling of Stress : Not at all   Social Connections: Unknown (1/15/2024)    Social Connection and Isolation Panel [NHANES]     Frequency of Communication with Friends and Family: More than three times a week     Frequency of Social Gatherings with Friends and Family: More than three times a week     Attends Oriental orthodox Services: Patient declined     Active Member of Clubs or Organizations: Patient declined     Attends Club or Organization Meetings: Patient declined     Marital Status: Patient unable to answer   Intimate Partner Violence: Not At Risk (1/15/2024)    Humiliation, Afraid, Rape, and Kick questionnaire     Fear of Current or Ex-Partner: No     Emotionally Abused: No     Physically Abused: No  "    Sexually Abused: No   Housing Stability: Low Risk  (4/27/2024)    Housing Stability Vital Sign     Unable to Pay for Housing in the Last Year: No     Number of Places Lived in the Last Year: 2     Unstable Housing in the Last Year: No        Physical Exam   deferred  ROS, PMH, FH/SH, surgical history and allergies have been reviewed.    Last Recorded Vitals  Blood pressure 147/63, pulse 58, temperature 34.6 °C (94.3 °F), temperature source Temporal, resp. rate 19, height 1.676 m (5' 6\"), weight 93.6 kg (206 lb 5.6 oz), SpO2 98%.    Relevant Results  Results from last 7 days   Lab Units 05/07/24  1958 05/07/24  1518 05/07/24  1433 05/07/24  1404 05/07/24  1402   POCT GLUCOSE mg/dL 284* 149*  --  161* 161*   GLUCOSE mg/dL  --   --  235*  --   --      Lab Results   Component Value Date    HGBA1C 8.4 (H) 04/24/2024       Latest Reference Range & Units Most Recent   CORTISOL 2.5 - 20.0 ug/dL 2.3 (L)  12/15/22 05:27   FOLLICLE STIMULATING HORMONE IU/L 61.5  3/20/24 09:29   Hemoglobin A1C see below % 8.4 (H)  4/24/24 10:06   Parathyroid Hormone, Intact 18.5 - 88.0 pg/mL 44.1  4/15/21 14:07   HCG, Beta-Quantitative <5 mIU/mL 4  3/9/24 07:39   Thyroxine, Free 0.61 - 1.12 ng/dL 1.01  3/20/24 09:29   Beta-Hydroxybutyrate 0.02 - 0.27 mmol/L 0.07  10/23/23 08:03   C-Peptide 0.7 - 3.9 ng/mL 2.9  4/25/24 07:31   Insulin 3 - 25 uIU/mL 6  3/24/23 06:18   LH IU/L 34.1  3/20/24 09:29   PROLACTIN 3.0 - 20.0 ug/L 49.7 (H)  3/20/24 09:29   Thyroid Stimulating Hormone 0.44 - 3.98 mIU/L 2.78  5/7/24 14:33   Vitamin D, 25-Hydroxy, Total ng/mL 34  12/10/22 06:58   Insulin, Free 3 - 25 uIU/mL 7  6/2/23 06:41   Insulin, Fasting 3 - 25 uIU/mL 10  12/17/22 10:21   GLUCOSE 74 - 99 mg/dL 235 (H)  5/7/24 14:33   Cortisol  A.M. 5.0 - 20.0 ug/dL 19.0  11/26/23 08:16   Estimated Average Glucose Not Established mg/dL 194  4/24/24 10:06   Insulin,Total 3 - 25 uIU/mL 10  6/2/23 06:41   Adrenocorticotropic Hormone (ACTH) 7.2 - 63.3 pg/mL 2.5 " (L)  11/21/23 06:53   IGF 1 (Insulin-Like Growth Factor 1) 40 - 244 ng/mL 87  3/20/24 09:29   IGF 1 Z Score Calculation  -0.6  3/20/24 09:29   Proinsulin, Intact <=8.0 pmol/L 20.2 (H)  3/24/23 06:18   (L): Data is abnormally low  (H): Data is abnormally high    Anatoliy Banuelos MD FACE  Office phone - [phone]  Fax - [phone]  Address: [address] [facility] [address]  Address: [address] [facility] [id number]  5/7/2024  9:18 PM

## 2024-05-08 NOTE — PROGRESS NOTES
[facility]       Date:  5/8/2024  Patient:  Bing Holliday  YOB: 1960  MRN:  [id number]   Admit Date:  5/7/2024  ========================================================================================================    Chief Complaint   Patient presents with    Altered Mental Status         History of Present Illness:  Bing Holliday is a 63 y.o. 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  History somewhat limited as the patient, while she awakens and is able to answer simple questions, is unable to offer any meaningful history.     Extensive chart review undertaken.  Patient with multiple presentations of the same.  Admitted from 1/17 - 1-28 to [facility] for similar presentation.  Underwent extensive workup there at that time, was treated with stress to steroids, rheumatology consultation who do not believe that she was in acute lupus flare, endocrinology consultation, as well as neurology consultation.  During that admission there was concern for potential seizure-like activity, the patient underwent a video EEG, was found to be in status, and was loaded with Keppra and continued on maintenance dosing.  She ultimately was discharged to skilled nursing facility for ongoing care.  At the time of discharge her noted prednisone dosing was 15 mg daily.     She was then readmitted to the hospital 3/9 through 3/14 with hypothermia, hyponatremia, altered mental status, and hypoglycemia.  She again underwent an extensive workup, was seen by endocrinology, manage on stress dose steroids, no infectious etiology elucidated  despite a very exhaustive search, she improved, and at that time she was discharged on what is documented as 20 mg of prednisone daily.     She then was readmitted to the hospital 3/19 through 3/26 with almost identical presentation of hypothermia, hyponatremia, hypoglycemia.  She was treated with stress dose steroids, underwent an MRI of the brain which was unrevealing, she returned to her baseline, and was discharged back to her skilled nursing facility.  At that time she was discharged home and is reported to be 20 mg of prednisone daily.     She then was readmitted to the hospital 4/24 to 4/30 for exactly the same presentation including hypoglycemia, hallucinations, hypothermia, and altered mental status.  And was treated in a very similar fashion including stress dose steroids.  She underwent an infectious workup, was treated for community-acquired pneumonia, and ultimately improved.  She was discharged with what was reported to be 20 mg of prednisone daily.     She presents today with hyperglycemia.  She received glucagon prior to arrival.  In the emergency department she was found to be leukopenic, hyponatremic, with a hemoglobin of 7.8 which is near her baseline hemoglobin.  She was given 1 g of ceftriaxone, 100 mg of hydrocortisone, and placed on a Allan hugger.  She was referred to the ICU for admission given her multiple medical comorbidities.       I was able to discuss case with the patient's skilled nursing facility, they note that she is getting 15 mg of prednisone daily. The recent discharge summary is from her prior admissions, the patient was to be discharged on 20 mg of prednisone as this is a dose that she has been well on.          Interval ICU Events:     5/7: Patient admitted to the ICU.  Seen and examined in the emergency department.  Somnolent but arouses to voice and follows simple commands.  Is without complaint.  Attempted to reach out to the patient's daughter who did not answer the  phone.    5/8: No acute overnight events.  Patient remains easily arousable to voice but minimally interactive.  Hypothermia improving.  Hypoglycemia resolved.  Creatinine stable.  Urinalysis consistent with urinary tract infection.  Endocrinology consultation completed, agree with stress dose steroids.  EEG to be performed this morning.  Keppra level therapeutic.  Procalcitonin pending.    Medical History:  Past Medical History:   Diagnosis Date    Abnormal kidney function 04/04/2023    Acute headache 03/10/2024    Acute low back pain 10/30/2023    Acute upper respiratory infection, unspecified 03/04/2020    Acute URI    Acute upper respiratory infection, unspecified 09/30/2015    URTI (acute upper respiratory infection)    Arthritis     Atypical facial pain 03/10/2024    Body mass index (BMI) 23.0-23.9, adult 10/15/2021    BMI 23.0-23.9, adult    Body mass index (BMI) 33.0-33.9, adult 03/04/2020    BMI 33.0-33.9,adult    Cardiomegaly 08/27/2013    Left ventricular hypertrophy    Chest pain 06/10/2022    Comment on above: Added by Problem List Migration; 2013-3-12; Moved to Suppressed Nov 25 2013 9:16PM; Comment on above: Added by Problem List Migration; 2013-3-12; Moved to Suppressed Nov 25 2013 9:16PM;    Chronic kidney disease, stage 3 unspecified (Multi) 07/02/2013    Chronic kidney disease, stage III (moderate)    Confusional state 03/10/2024    Contact with and (suspected) exposure to covid-19 04/04/2023    Delirium 03/10/2024    Disease of pericardium, unspecified ([facility]) 07/02/2013    Pericardial disease    Encounter for follow-up examination after completed treatment for conditions other than malignant neoplasm 10/06/2022    Hospital discharge follow-up    Generalized contraction of visual field, right eye 01/29/2015    Generalized contraction of visual field of right eye    Hearing loss 03/10/2024    History of cataract 03/10/2024    History of thrombocytopenia 03/10/2024    Comment on above: Added by  Problem List Migration; 2013-7-2;    Homonymous bilateral field defects, right side 04/29/2016    Homonymous bilateral field defects of right side    Hypertensive chronic kidney disease with stage 1 through stage 4 chronic kidney disease, or unspecified chronic kidney disease 07/02/2013    Nephrosclerosis    Laceration without foreign body, left foot, initial encounter 07/03/2018    Foot laceration, left, initial encounter    Localized edema 03/10/2024    Mass of skin 03/10/2024    Migraine with aura, not intractable, without status migrainosus 10/24/2022    Ocular migraine    Open wound 03/10/2024    Open wound of left foot 03/10/2024    Other conditions influencing health status 07/02/2013    Chronic Glomerulonephritis In Diseases Classified Elsewhere    Other conditions influencing health status 07/02/2013    Progressive Familial Myoclonic Epilepsy    Other conditions influencing health status 07/02/2013    Protein S Deficiency    Other conditions influencing health status 05/22/2015    Familial Combined Hyperlipidemia    Other conditions influencing health status 10/24/2022    IDDM (insulin dependent diabetes mellitus)    Other conditions influencing health status 03/14/2022    Diabetes mellitus, insulin dependent (IDDM), uncontrolled    Other long term (current) drug therapy 10/24/2022    Long-term use of Plaquenil    Overweight with body mass index (BMI) 25.0-29.9 03/10/2024    Pain of toe 03/10/2024    Personal history of COVID-19 04/04/2023    Personal history of diseases of the blood and blood-forming organs and certain disorders involving the immune mechanism 07/02/2013    History of thrombocytopenia    Personal history of diseases of the skin and subcutaneous tissue 08/11/2015    History of foot ulcer    Personal history of nephrotic syndrome 07/02/2013    History of nephrotic syndrome    Personal history of other diseases of the circulatory system 08/27/2013    History of sinus tachycardia    Personal  history of other diseases of the nervous system and sense organs     History of cataract    Personal history of other diseases of the respiratory system     History of bronchitis    Personal history of other infectious and parasitic diseases 07/02/2013    History of hepatitis    Personal history of other specified conditions     History of shortness of breath    Personal history of other specified conditions 08/27/2013    History of edema    Posterior epistaxis 03/10/2024    Puckering of macula, right eye 10/24/2022    ERM OD (epiretinal membrane, right eye)    Raynaud's syndrome without gangrene 07/02/2013    Raynaud's disease    Sinusitis 03/10/2024    Systemic lupus erythematosus, unspecified (Multi) 07/24/2015    SLE (systemic lupus erythematosus)    Systemic lupus erythematosus, unspecified (Multi) 07/24/2015    SLE (systemic lupus erythematosus)    Systemic lupus erythematosus, unspecified (Multi) 07/24/2015    Systemic lupus    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) 07/02/2013    Type 2 diabetes with nephropathy    Type 2 diabetes mellitus with mild nonproliferative diabetic retinopathy without macular edema, left eye (Multi) 07/27/2015    Non-proliferative diabetic retinopathy, left eye    Type 2 diabetes mellitus with mild nonproliferative diabetic retinopathy without macular edema, unspecified eye (Multi) 07/24/2015    Mild non proliferative diabetic retinopathy    Type 2 diabetes mellitus with proliferative diabetic retinopathy without macular edema, right eye (Multi) 07/27/2015    Proliferative diabetic retinopathy of right eye    Type 2 diabetes mellitus with proliferative diabetic retinopathy without macular edema, unspecified eye (Multi) 07/24/2015    Diabetic proliferative retinopathy    Unspecified acute and subacute iridocyclitis 07/24/2015    Acute iritis, right eye    Unspecified open wound, left foot, sequela 07/03/2018    Wound, open, foot, left, sequela     Past Surgical  History:   Procedure Laterality Date    ANKLE SURGERY  01/29/2015    Ankle Surgery    CHOLECYSTECTOMY  01/29/2015    Cholecystectomy    CT GUIDED PERCUTANEOUS BIOPSY BONE DEEP  5/4/2021    CT GUIDED PERCUTANEOUS BIOPSY BONE DEEP 5/4/2021 [facility]    EYE SURGERY  03/06/2015    Eye Surgery    FOOT SURGERY  01/29/2015    Foot Surgery    MR HEAD ANGIO WO IV CONTRAST  7/26/2013    MR HEAD ANGIO WO IV CONTRAST 7/26/2013 [facility] CL[facility]ICAL [facility]GAC[facility]    MR HEAD ANGIO WO IV CONTRAST  9/17/2021    MR HEAD ANGIO WO IV CONTRAST 9/17/2021 [facility]    MR HEAD ANGIO WO IV CONTRAST  3/25/2023    MR HEAD ANGIO WO IV CONTRAST [facility]    MR NECK ANGIO WO IV CONTRAST  7/26/2013    MR NECK ANGIO WO IV CONTRAST 7/26/2013 [facility]GAC[facility]    MR NECK ANGIO WO IV CONTRAST  9/17/2021    MR NECK ANGIO WO IV CONTRAST 9/17/2021 [facility]    MR NECK ANGIO WO IV CONTRAST  3/25/2023    MR NECK ANGIO WO IV CONTRAST [facility]    OTHER SURGICAL HISTORY  01/29/2015    Creation Of Pericardial Window    OTHER SURGICAL HISTORY  01/29/2015    Quadricepsplasty    TOTAL HIP ARTHROPLASTY  01/29/2015    Hip Replacement     Medications Prior to Admission   Medication Sig Dispense Refill Last Dose    acetaminophen (Tylenol) 325 mg tablet Take by mouth every 6 hours if needed for mild pain (1 - 3), headaches or fever (temp greater than 38.0 C).       acetaminophen (Tylenol) 650 mg suppository Insert 1 suppository (650 mg) into the rectum every 4 hours if needed for mild pain (1 - 3), moderate pain (4 - 6) or fever (temp greater than 38.0 C).       albuterol 90 mcg/actuation inhaler Inhale 2 puffs every 6 hours if needed for wheezing.       alum-mag hydroxide-simeth (Mylanta) 200-200-20 mg/5 mL oral suspension Take 30 mL by mouth every 4 hours if needed for indigestion.       apixaban (Eliquis) 2.5 mg tablet Take 1 tablet (2.5 mg) by mouth 2 times a day. 60 tablet 1     atorvastatin (Lipitor) 40 mg tablet Take 1 tablet (40 mg) by  "mouth once daily. (Patient taking differently: Take 1 tablet (40 mg) by mouth once daily at bedtime.) 90 tablet 3     bisacodyl (Dulcolax) 10 mg suppository Insert 1 suppository (10 mg) into the rectum 1 time.       blood-glucose sensor (Dexcom G6 Sensor) device Use to check sugars 3 times daily (Patient not taking: Reported on 4/24/2024) 4 each 2     Dexcom G4 platinum  (Dexcom G6 ) misc Use as instructed (Patient not taking: Reported on 4/24/2024) 1 each 0     Dexcom G4 platinum transmitter (Dexcom G6 Transmitter) device Use as instructed (Patient not taking: Reported on 4/24/2024) 1 each 0     fluticasone-umeclidin-vilanter (TRELEGY-ELLIPTA) 200-62.5-25 mcg blister with device Inhale 1 puff once daily. 60 each 5     folic acid (Folvite) 1 mg tablet TAKE 1 TABLET BY MOUTH EVERY DAY 90 tablet 1     guaiFENesin (Mucinex) 600 mg 12 hr tablet Take 2 tablets (1,200 mg) by mouth 2 times a day. Do not crush, chew, or split.       levETIRAcetam (Keppra) 500 mg tablet Take 1 tablet (500 mg) by mouth every 12 hours. 60 tablet 0     magnesium hydroxide (Milk Of Magnesia Concentrated) 2,400 mg/10 mL suspension suspension Take 30 mL by mouth once daily as needed for constipation.       magnesium oxide (Mag-Ox) 400 mg (241.3 mg magnesium) tablet Take 2 tablets (800 mg) by mouth once daily. Do not fill before May 1, 2024.       meclizine (Antivert) 25 mg tablet Chew 1 tablet (25 mg) every 12 hours if needed for dizziness.       melatonin 5 mg tablet Take 1 tablet (5 mg) by mouth once daily at bedtime.       multivitamin tablet Take 1 tablet by mouth once daily.       mycophenolate (Cellcept) 500 mg tablet TAKE 2 TABLETS BY MOUTH TWICE A  tablet 0     OLANZapine (ZyPREXA) 5 mg tablet Take 1 tablet (5 mg) by mouth 2 times a day.       pantoprazole (ProtoNix) 40 mg EC tablet TAKE 1 TABLET BY MOUTH EVERY DAY 90 tablet 1     pen needle, diabetic 31 gauge x 5/16\" needle Use to inject 1-4 times daily as directed. " "100 each 11     predniSONE (Deltasone) 5 mg tablet Take 3 tablets (15 mg) by mouth once daily.       sodium phosphates (Fleet Enema) 19-7 gram/118 mL enema enema Insert 133 mL (1 enema) into the rectum every 4 hours if needed for constipation.       sodium polystyrene 15 gram/60 mL suspension Take 0.12 mL (0.03 g) by mouth 2 times a day. 9:00 and 16:00       thiamine 100 mg tablet Take 1 tablet (100 mg) by mouth once daily.        Ace inhibitors, Hydroxychloroquine, Lisinopril, Penicillins, and Sulfa (sulfonamide antibiotics)  Social History     Tobacco Use    Smoking status: Never     Passive exposure: Never    Smokeless tobacco: Never   Vaping Use    Vaping status: Never Used   Substance Use Topics    Alcohol use: Not Currently     Comment: RARE    Drug use: Never     Family History   Problem Relation Name Age of Onset    Other (RENAL DISEASE) Mother          END STAGE    Other (CARDIAC DISORDER) Mother      Cataracts Mother      Stroke Mother      Diabetes Mother      Kidney disease Mother      Lupus Mother      Other (CARDIAC DISORDER) Father      COPD Father      Glaucoma Father      Hypertension Father      Sleep apnea Father      Other (CARDIAC DISORDER) Sister      Depression Sister      Kidney disease Sister      Sickle cell trait Sister      Sleep apnea Daughter         Review of Systems:  14 point review of systems was completed and negative except for those specially mention in my HPI    Physical Exam:    Heart Rate:  [54-88]   Temp:  [34.6 °C (94.3 °F)-36.9 °C (98.4 °F)]   Resp:  [15-64]   BP: (127-171)/(56-84)   Height:  [167.6 cm (5' 6\")]   Weight:  [89.4 kg (197 lb)-93.6 kg (206 lb 5.6 oz)]   SpO2:  [92 %-100 %]     Physical Exam  Vitals and nursing note reviewed.   Constitutional:       General: She is not in acute distress.     Appearance: She is ill-appearing.   HENT:      Head: Normocephalic and atraumatic.      Mouth/Throat:      Mouth: Mucous membranes are dry.   Eyes:      Pupils: Pupils are " equal, round, and reactive to light.   Cardiovascular:      Rate and Rhythm: Bradycardia present.   Pulmonary:      Effort: Pulmonary effort is normal. No respiratory distress.      Breath sounds: Normal breath sounds.   Abdominal:      General: Abdomen is flat. There is no distension.      Tenderness: There is no abdominal tenderness.   Musculoskeletal:      Cervical back: Neck supple.      Right lower leg: No edema.      Left lower leg: No edema.   Skin:     General: Skin is warm and dry.      Capillary Refill: Capillary refill takes less than 2 seconds.   Neurological:      General: No focal deficit present.      Mental Status: She is disoriented.         Objective:    I have reviewed all medications, laboratory results, and imaging pertinent for today's encounter    Assessment/Plan:    I am currently managing this critically ill patient for the following problems:    Acute encephalopathy: Multiple presentations of the same, believed to be due to adrenal insufficiency.  Rule out septic encephalopathy  History of lupus cerebritis  History of seizure (believed to be focal seizure in nature based on chart review)  Hypoxia on supplemental O2   History of coronary artery disease  History of atrial fibrillation on Eliquis  Hx of heart failure with reduced EF not in acute exacerbation   History of GERD  Recurrent hypoglycemic episodes believed to be due to adrenal insufficiency - possibly due to med rec error.   History of type 2 diabetes now with mild hyperglycemia   History of adrenal insufficiency  Chronic anemia  History of lupus  Immune suppressed     Neuro/Psych/Pain Ctrl/Sedation:  Tylenol available for pain control  Hold Zyprexa   Continue Keppra given her history of seizure, level therapeutic   EEG report pending    Neuro consult   Will consider LP in the future however suspicion for meningitis low.      Respiratory/ENT:  Maintain SpO2 greater than 90%, currently on 2 L nasal cannula  DuoNebs Q6  Home inhalers        Cardiovascular:  Maintain MAP greater than 65, not currently on any vasoactive agents  Hold Statin  Has been unable to tolerate GDMT in the past due to allergies and other comorbidities  ASA given hx of CAD   Hold Eliquis (NPO)      GI:  NPO pending improvement in mental status   Home PPI     Renal/Volume Status (Intra & Extravascular):  Lytes/UOP Acceptable - no need for RRT  LR @ 100    Endocrine  Stress dose steroids 100mg Q8  ENDO following for steroid dosing   Should be on at least 20mg Prednisone NOT 15mg when acute process resolved      Infectious Disease:  Linzolid with hx of VRE pending urine Cx  Continue Ceftriaxone pending final blood culture data     Heme/Onc:  Transfuse for symptomatic anemia, no current indication   Hold MMF      Ethics/Code Status:  Full Code     :  DVT Prophylaxis: Heparin  GI Prophylaxis: PPI  Diet: NPO  CVC: NA  [place]: ANDREW  [place]: NA  Restraints: NA  Dispo: Floor     I have personally spent 35 minutes of critical care time, exclusive of time spent on any procedures, in evaluation and management of this critically ill patient        Hay Mckeon DO

## 2024-05-08 NOTE — PROCEDURES
vEEG is currently running. Please make sure patient is visible in the camera to capture clinical signs.     EEG techs are available for electrode repair and/or removal M-F [id number] (excluding holidays).     [facility] Neurology fellows will provide a daily report on the study that will be uploaded to the patient's chart.   Please note, EEG data is only uploaded for viewing every 6 hours.     To discontinue, please place a discontinue EEG order.

## 2024-05-09 ENCOUNTER — APPOINTMENT (OUTPATIENT)
Dept: CARDIOLOGY | Facility: HOSPITAL | Age: 64
DRG: 981 | End: 2024-05-09
Payer: MEDICARE

## 2024-05-09 LAB
ABO GROUP (TYPE) IN BLOOD: NORMAL
ABO GROUP (TYPE) IN BLOOD: NORMAL
ALBUMIN SERPL BCP-MCNC: 2.5 G/DL (ref 3.4–5)
ALP SERPL-CCNC: 114 U/L (ref 33–136)
ALT SERPL W P-5'-P-CCNC: 19 U/L (ref 7–45)
ANION GAP SERPL CALC-SCNC: 10 MMOL/L (ref 10–20)
ANTIBODY SCREEN: NORMAL
AST SERPL W P-5'-P-CCNC: 17 U/L (ref 9–39)
ATRIAL RATE: 39 BPM
ATRIAL RATE: 65 BPM
BACTERIA UR CULT: NORMAL
BASO STIPL BLD QL SMEAR: PRESENT
BASOPHILS # BLD MANUAL: 0 X10*3/UL (ref 0–0.1)
BASOPHILS NFR BLD MANUAL: 0 %
BILIRUB SERPL-MCNC: 0.2 MG/DL (ref 0–1.2)
BLOOD EXPIRATION DATE: NORMAL
BUN SERPL-MCNC: 25 MG/DL (ref 6–23)
CALCIUM SERPL-MCNC: 7.9 MG/DL (ref 8.6–10.3)
CHLORIDE SERPL-SCNC: 113 MMOL/L (ref 98–107)
CO2 SERPL-SCNC: 27 MMOL/L (ref 21–32)
CREAT SERPL-MCNC: 1.7 MG/DL (ref 0.5–1.05)
DISPENSE STATUS: NORMAL
EGFRCR SERPLBLD CKD-EPI 2021: 34 ML/MIN/1.73M*2
EOSINOPHIL # BLD MANUAL: 0 X10*3/UL (ref 0–0.7)
EOSINOPHIL NFR BLD MANUAL: 0 %
ERYTHROCYTE [DISTWIDTH] IN BLOOD BY AUTOMATED COUNT: 18.7 % (ref 11.5–14.5)
GLUCOSE SERPL-MCNC: 241 MG/DL (ref 74–99)
HCT VFR BLD AUTO: 23.2 % (ref 36–46)
HGB BLD-MCNC: 6.9 G/DL (ref 12–16)
HOLD SPECIMEN: NORMAL
HYPOCHROMIA BLD QL SMEAR: ABNORMAL
IMM GRANULOCYTES # BLD AUTO: 0.63 X10*3/UL (ref 0–0.7)
IMM GRANULOCYTES NFR BLD AUTO: 15.6 % (ref 0–0.9)
LYMPHOCYTES # BLD MANUAL: 0.41 X10*3/UL (ref 1.2–4.8)
LYMPHOCYTES NFR BLD MANUAL: 10 %
MAGNESIUM SERPL-MCNC: 2.08 MG/DL (ref 1.6–2.4)
MCH RBC QN AUTO: 30 PG (ref 26–34)
MCHC RBC AUTO-ENTMCNC: 29.7 G/DL (ref 32–36)
MCV RBC AUTO: 101 FL (ref 80–100)
MONOCYTES # BLD MANUAL: 0.08 X10*3/UL (ref 0.1–1)
MONOCYTES NFR BLD MANUAL: 2 %
MYELOCYTES # BLD MANUAL: 0.29 X10*3/UL
MYELOCYTES NFR BLD MANUAL: 7 %
NEUTS SEG # BLD MANUAL: 3.32 X10*3/UL (ref 1.2–7)
NEUTS SEG NFR BLD MANUAL: 81 %
NRBC BLD-RTO: 4.4 /100 WBCS (ref 0–0)
OVALOCYTES BLD QL SMEAR: ABNORMAL
P AXIS: 55 DEGREES
P AXIS: 61 DEGREES
P OFFSET: 196 MS
P OFFSET: 197 MS
P ONSET: 136 MS
P ONSET: 137 MS
PLATELET # BLD AUTO: 97 X10*3/UL (ref 150–450)
POLYCHROMASIA BLD QL SMEAR: ABNORMAL
POTASSIUM SERPL-SCNC: 5.1 MMOL/L (ref 3.5–5.3)
PR INTERVAL: 156 MS
PR INTERVAL: 158 MS
PRODUCT BLOOD TYPE: 9500
PRODUCT CODE: NORMAL
PROT SERPL-MCNC: 4.9 G/DL (ref 6.4–8.2)
Q ONSET: 215 MS
Q ONSET: 215 MS
QRS COUNT: 10 BEATS
QRS COUNT: 7 BEATS
QRS DURATION: 100 MS
QRS DURATION: 102 MS
QT INTERVAL: 448 MS
QT INTERVAL: 502 MS
QTC CALCULATION(BAZETT): 404 MS
QTC CALCULATION(BAZETT): 465 MS
QTC FREDERICIA: 435 MS
QTC FREDERICIA: 459 MS
R AXIS: -10 DEGREES
R AXIS: -14 DEGREES
RBC # BLD AUTO: 2.3 X10*6/UL (ref 4–5.2)
RBC MORPH BLD: ABNORMAL
RH FACTOR (ANTIGEN D): NORMAL
RH FACTOR (ANTIGEN D): NORMAL
SCHISTOCYTES BLD QL SMEAR: ABNORMAL
SODIUM SERPL-SCNC: 145 MMOL/L (ref 136–145)
T AXIS: -9 DEGREES
T AXIS: 254 DEGREES
T OFFSET: 439 MS
T OFFSET: 466 MS
TOTAL CELLS COUNTED BLD: 100
UNIT ABO: NORMAL
UNIT NUMBER: NORMAL
UNIT RH: NORMAL
UNIT VOLUME: 350
VENTRICULAR RATE: 39 BPM
VENTRICULAR RATE: 65 BPM
WBC # BLD AUTO: 4.1 X10*3/UL (ref 4.4–11.3)
XM INTEP: NORMAL

## 2024-05-09 PROCEDURE — 51701 INSERT BLADDER CATHETER: CPT

## 2024-05-09 PROCEDURE — 80053 COMPREHEN METABOLIC PANEL: CPT | Performed by: HOSPITALIST

## 2024-05-09 PROCEDURE — 87389 HIV-1 AG W/HIV-1&-2 AB AG IA: CPT | Mod: ELYLAB | Performed by: STUDENT IN AN ORGANIZED HEALTH CARE EDUCATION/TRAINING PROGRAM

## 2024-05-09 PROCEDURE — 2500000002 HC RX 250 W HCPCS SELF ADMINISTERED DRUGS (ALT 637 FOR MEDICARE OP, ALT 636 FOR OP/ED): Performed by: EMERGENCY MEDICINE

## 2024-05-09 PROCEDURE — 94640 AIRWAY INHALATION TREATMENT: CPT

## 2024-05-09 PROCEDURE — 85007 BL SMEAR W/DIFF WBC COUNT: CPT | Performed by: HOSPITALIST

## 2024-05-09 PROCEDURE — 83735 ASSAY OF MAGNESIUM: CPT | Performed by: HOSPITALIST

## 2024-05-09 PROCEDURE — 99223 1ST HOSP IP/OBS HIGH 75: CPT | Performed by: INTERNAL MEDICINE

## 2024-05-09 PROCEDURE — 85027 COMPLETE CBC AUTOMATED: CPT | Performed by: HOSPITALIST

## 2024-05-09 PROCEDURE — 86160 COMPLEMENT ANTIGEN: CPT | Mod: ELYLAB | Performed by: STUDENT IN AN ORGANIZED HEALTH CARE EDUCATION/TRAINING PROGRAM

## 2024-05-09 PROCEDURE — 95714 VEEG EA 12-26 HR UNMNTR: CPT

## 2024-05-09 PROCEDURE — 36430 TRANSFUSION BLD/BLD COMPNT: CPT

## 2024-05-09 PROCEDURE — 93010 ELECTROCARDIOGRAM REPORT: CPT | Performed by: INTERNAL MEDICINE

## 2024-05-09 PROCEDURE — 36415 COLL VENOUS BLD VENIPUNCTURE: CPT | Performed by: HOSPITALIST

## 2024-05-09 PROCEDURE — 2020000001 HC ICU ROOM DAILY

## 2024-05-09 PROCEDURE — 86235 NUCLEAR ANTIGEN ANTIBODY: CPT | Mod: ELYLAB | Performed by: STUDENT IN AN ORGANIZED HEALTH CARE EDUCATION/TRAINING PROGRAM

## 2024-05-09 PROCEDURE — 86920 COMPATIBILITY TEST SPIN: CPT

## 2024-05-09 PROCEDURE — 93005 ELECTROCARDIOGRAM TRACING: CPT

## 2024-05-09 PROCEDURE — 2500000004 HC RX 250 GENERAL PHARMACY W/ HCPCS (ALT 636 FOR OP/ED): Performed by: HOSPITALIST

## 2024-05-09 PROCEDURE — C9113 INJ PANTOPRAZOLE SODIUM, VIA: HCPCS | Performed by: EMERGENCY MEDICINE

## 2024-05-09 PROCEDURE — P9016 RBC LEUKOCYTES REDUCED: HCPCS

## 2024-05-09 PROCEDURE — 99291 CRITICAL CARE FIRST HOUR: CPT | Performed by: EMERGENCY MEDICINE

## 2024-05-09 PROCEDURE — 2500000004 HC RX 250 GENERAL PHARMACY W/ HCPCS (ALT 636 FOR OP/ED): Performed by: EMERGENCY MEDICINE

## 2024-05-09 PROCEDURE — 95720 EEG PHY/QHP EA INCR W/VEEG: CPT | Performed by: PSYCHIATRY & NEUROLOGY

## 2024-05-09 PROCEDURE — 86901 BLOOD TYPING SEROLOGIC RH(D): CPT | Performed by: HOSPITALIST

## 2024-05-09 PROCEDURE — 99232 SBSQ HOSP IP/OBS MODERATE 35: CPT | Performed by: STUDENT IN AN ORGANIZED HEALTH CARE EDUCATION/TRAINING PROGRAM

## 2024-05-09 PROCEDURE — 2500000005 HC RX 250 GENERAL PHARMACY W/O HCPCS: Performed by: EMERGENCY MEDICINE

## 2024-05-09 RX ORDER — VANCOMYCIN HYDROCHLORIDE 1 G/20ML
INJECTION, POWDER, LYOPHILIZED, FOR SOLUTION INTRAVENOUS DAILY PRN
Status: DISCONTINUED | OUTPATIENT
Start: 2024-05-09 | End: 2024-05-12

## 2024-05-09 RX ORDER — HYDRALAZINE HYDROCHLORIDE 20 MG/ML
10 INJECTION INTRAMUSCULAR; INTRAVENOUS EVERY 4 HOURS PRN
Status: DISCONTINUED | OUTPATIENT
Start: 2024-05-09 | End: 2024-05-18 | Stop reason: HOSPADM

## 2024-05-09 RX ORDER — ATROPINE SULFATE 0.1 MG/ML
INJECTION INTRAVENOUS
Status: DISPENSED
Start: 2024-05-09 | End: 2024-05-10

## 2024-05-09 RX ORDER — INSULIN LISPRO 100 [IU]/ML
0-10 INJECTION, SOLUTION INTRAVENOUS; SUBCUTANEOUS EVERY 4 HOURS
Status: DISCONTINUED | OUTPATIENT
Start: 2024-05-09 | End: 2024-05-14

## 2024-05-09 RX ORDER — MIDAZOLAM HYDROCHLORIDE 1 MG/ML
2 INJECTION, SOLUTION INTRAMUSCULAR; INTRAVENOUS ONCE
Status: COMPLETED | OUTPATIENT
Start: 2024-05-09 | End: 2024-05-09

## 2024-05-09 RX ADMIN — LEVETIRACETAM 500 MG: 5 INJECTION INTRAVENOUS at 09:06

## 2024-05-09 RX ADMIN — HEPARIN SODIUM 5000 UNITS: 5000 INJECTION INTRAVENOUS; SUBCUTANEOUS at 00:48

## 2024-05-09 RX ADMIN — LINEZOLID 600 MG: 600 INJECTION, SOLUTION INTRAVENOUS at 02:07

## 2024-05-09 RX ADMIN — INSULIN LISPRO 2 UNITS: 100 INJECTION, SOLUTION INTRAVENOUS; SUBCUTANEOUS at 12:26

## 2024-05-09 RX ADMIN — VANCOMYCIN HYDROCHLORIDE 1250 MG: 1.25 INJECTION, POWDER, LYOPHILIZED, FOR SOLUTION INTRAVENOUS at 17:26

## 2024-05-09 RX ADMIN — Medication 3 L/MIN: at 18:00

## 2024-05-09 RX ADMIN — HYDROCORTISONE SODIUM SUCCINATE 100 MG: 100 INJECTION, POWDER, FOR SOLUTION INTRAMUSCULAR; INTRAVENOUS at 09:20

## 2024-05-09 RX ADMIN — INSULIN LISPRO 4 UNITS: 100 INJECTION, SOLUTION INTRAVENOUS; SUBCUTANEOUS at 04:25

## 2024-05-09 RX ADMIN — INSULIN LISPRO 2 UNITS: 100 INJECTION, SOLUTION INTRAVENOUS; SUBCUTANEOUS at 09:03

## 2024-05-09 RX ADMIN — HYDROCORTISONE SODIUM SUCCINATE 100 MG: 100 INJECTION, POWDER, FOR SOLUTION INTRAMUSCULAR; INTRAVENOUS at 00:48

## 2024-05-09 RX ADMIN — IPRATROPIUM BROMIDE AND ALBUTEROL SULFATE 3 ML: 2.5; .5 SOLUTION RESPIRATORY (INHALATION) at 20:12

## 2024-05-09 RX ADMIN — IPRATROPIUM BROMIDE AND ALBUTEROL SULFATE 3 ML: 2.5; .5 SOLUTION RESPIRATORY (INHALATION) at 13:27

## 2024-05-09 RX ADMIN — MIDAZOLAM 2 MG: 1 INJECTION INTRAMUSCULAR; INTRAVENOUS at 15:08

## 2024-05-09 RX ADMIN — LEVETIRACETAM 500 MG: 5 INJECTION INTRAVENOUS at 21:34

## 2024-05-09 RX ADMIN — CEFTRIAXONE SODIUM 2 G: 2 INJECTION, POWDER, FOR SOLUTION INTRAMUSCULAR; INTRAVENOUS at 16:22

## 2024-05-09 RX ADMIN — Medication 3 L/MIN: at 20:12

## 2024-05-09 RX ADMIN — SODIUM CHLORIDE, POTASSIUM CHLORIDE, SODIUM LACTATE AND CALCIUM CHLORIDE 100 ML/HR: 600; 310; 30; 20 INJECTION, SOLUTION INTRAVENOUS at 05:53

## 2024-05-09 RX ADMIN — INSULIN LISPRO 2 UNITS: 100 INJECTION, SOLUTION INTRAVENOUS; SUBCUTANEOUS at 00:48

## 2024-05-09 RX ADMIN — HYDROCORTISONE SODIUM SUCCINATE 100 MG: 100 INJECTION, POWDER, FOR SOLUTION INTRAMUSCULAR; INTRAVENOUS at 16:25

## 2024-05-09 RX ADMIN — PANTOPRAZOLE SODIUM 40 MG: 40 INJECTION, POWDER, FOR SOLUTION INTRAVENOUS at 09:06

## 2024-05-09 RX ADMIN — HEPARIN SODIUM 5000 UNITS: 5000 INJECTION INTRAVENOUS; SUBCUTANEOUS at 09:06

## 2024-05-09 RX ADMIN — ACYCLOVIR SODIUM 590 MG: 50 INJECTION, SOLUTION INTRAVENOUS at 15:09

## 2024-05-09 RX ADMIN — ACYCLOVIR SODIUM 590 MG: 50 INJECTION, SOLUTION INTRAVENOUS at 21:33

## 2024-05-09 RX ADMIN — INSULIN LISPRO 2 UNITS: 100 INJECTION, SOLUTION INTRAVENOUS; SUBCUTANEOUS at 21:34

## 2024-05-09 ASSESSMENT — PAIN SCALES - PAIN ASSESSMENT IN ADVANCED DEMENTIA (PAINAD)
BREATHING: NORMAL
TOTALSCORE: 0
BODYLANGUAGE: RELAXED
CONSOLABILITY: NO NEED TO CONSOLE
FACIALEXPRESSION: SMILING OR INEXPRESSIVE

## 2024-05-09 ASSESSMENT — PAIN SCALES - GENERAL: PAINLEVEL_OUTOF10: 0 - NO PAIN

## 2024-05-09 NOTE — CONSULTS
Inpatient consult to Cardiology  Consult performed by: Antonio Rodriges MD  Consult ordered by: Hay Mckeon DO  Reason for consult: Bradycardia  Assessment/Recommendations: Patient was seen, chart reviewed.    Patient has a history of lupus complicated with cerebritis on immunosuppressant and also prednisone.    For the last 3 months patient had multiple admissions for episodes of hypothermia, hypotension and also hyponatremia.  All these episodes were associated with altered mental status.  Patient was readmitted again with the same problems.  On arrival her EKG was consistent with sinus rhythm.  Looking at her records also in hospitalizations in the past also she tried to be bradycardic during admission.  In the last 24 hours her heart rate on telemetry has been showing as low as 32 bpm.  Blood pressure stable above 150 mmHg systolic.    Will continue with telemetry for now.    If heart rate drops less than 30 bpm patient should start IV dopamine.  Most likely bradycardia due to CNS.  Agree with lumbar puncture.  Hold Eliquis therapy.  Apparently patient has a history of atrial fibrillation.  Avoid AV camille blocking agents  Keep potassium equal more than 4.0 magnesium equal more than 2.0.      Please excuse any errors in grammar or translation related to this dictation.  Voice recognition software was utilized to prepare this document.        History Of Present Illness:    Bing Holliday is a 63 y.o. female presenting with mental status changes. Past Medical History of lupus complicated by cerebritis currently on CellCept and prednisone, recurrent adrenal insufficiency, CKD with a baseline creatinine of 1.5-1.9, COPD, GERD, atrial fibrillation on Eliquis, coronary artery disease s/p CABG in 2013, history of AAA     Patient had multiple admissions for hyper thermia, hyponatremia and also mental status changes.  She was treated with steroid therapy.  She has a history of adrenal insufficiency described  above.    Patient was admitted for hypoglycemia.  She also was found to be hyponatremic and also with anemia with a hemoglobin of 7.8.  Chronic hemoglobin 6.9.  She has been treated also for possible UTI with antibiotic therapy.      EKG on arrival shows sinus rhythm at a rate of 65 bpm QRS ration 100 ms QT corrected 165 ms.  The last 24 hours, patient has been bradycardic with heart rates as low as 32 bpm.  EKG performed today shows sinus bradycardia at a rate of 39 bpm QRS ration 102 ms QT corrected 440 ms.  Her laboratory on admission shows sodium 136 potassium 4.0 BUN 29 creatinine 1.53 GFR of 38 ALT 23 AST 24 troponin 15 TSH 2.79.  WBC 3.0.  Hemoglobin 7.8.  Hematocrit 26.3.  Platelet count 120.  Hemoglobin today 6.9.    Looking at her records.  EKGs during admission also has been showing marked sinus bradycardia with rates as low as 40 bpm.      Echocardiogram March 2024   CONCLUSIONS:   1. Left ventricular systolic function is mildly decreased with a 40-45% estimated ejection fraction.   2. There is low normal right ventricular systolic function.   3. Mild to moderate mitral valve regurgitation.   4. Mild to moderately elevated right ventricular systolic pressure.   5. Aortic valve stenosis is not present.   6. There is global hypokinesis of the left ventricle with minor regional variations  Last Recorded Vitals:  Vitals:    05/09/24 1400 05/09/24 1430 05/09/24 1500 05/09/24 1510   BP: (!) 173/92 96/65 (!) 127/107 170/85   Pulse: (!) 34 (!) 34 60 (!) 36   Resp: 10 10 (!) 27 12   Temp: 36.2 °C (97.2 °F)      TempSrc:       SpO2: 100% 100% 95% 100%   Weight:       Height:           Last Labs:  CBC - 5/9/2024:  3:59 AM  4.1 6.9 97    23.2      CMP - 5/9/2024:  3:59 AM  7.9 4.9 17 --- 0.2   2.8 2.5 19 114      PTT - 1/17/2024:  9:15 AM  1.0   11.5 37     Troponin I, High Sensitivity   Date/Time Value Ref Range Status   05/07/2024 02:33 PM 15 (H) 0 - 13 ng/L Final   03/20/2024 12:51 AM 23 (H) 0 - 13 ng/L Final    03/19/2024 11:28 PM 22 (H) 0 - 13 ng/L Final     BNP   Date/Time Value Ref Range Status   03/19/2024 11:28  (H) 0 - 99 pg/mL Final   03/20/2023 11:31 PM 98 0 - 99 pg/mL Final     Comment:     .  <100 pg/mL - Heart failure unlikely  100-299 pg/mL - Intermediate probability of acute heart  .               failure exacerbation. Correlate with clinical  .               context and patient history.    >=300 pg/mL - Heart Failure likely. Correlate with clinical  .               context and patient history.  BNP testing is performed using different testing   methodology at [facility] than at other   system [facility]. Direct result comparisons should   only be made within the same method.     12/09/2022 04:59 PM 21 0 - 99 pg/mL Final     Comment:     .  <100 pg/mL - Heart failure unlikely  100-299 pg/mL - Intermediate probability of acute heart  .               failure exacerbation. Correlate with clinical  .               context and patient history.    >=300 pg/mL - Heart Failure likely. Correlate with clinical  .               context and patient history.  BNP testing is performed using different testing   methodology at [facility] than at other   [facility] hospitals. Direct result comparisons should   only be made within the same method.       Hemoglobin A1C   Date/Time Value Ref Range Status   04/24/2024 10:06 AM 8.4 (H) see below % Final   02/08/2024 03:35 AM 8.0 (H) 4.3 - 5.6 % Final     Comment:     American Diabetes Association guidelines indicate that patients with HgbA1c in the range 5.7-6.4% are at increased risk for development of diabetes, and intervention by lifestyle modification may be beneficial. HgbA1c greater or equal to 6.5% is considered diagnostic of diabetes.   01/24/2024 08:34 AM 7.1 (H) see below % Final     VLDL   Date/Time Value Ref Range Status   03/24/2023 10:53 AM 16 0 - 40 mg/dL Final   09/18/2021 06:00 AM 25 0 - 40 mg/dL Final   05/05/2021 06:15 AM 10 0 - 40  mg/dL Final      Last I/O:  I/O last 3 completed shifts:  In: 6813.7 (69.3 mL/kg) [I.V.:3878.7 (39.5 mL/kg); IV Piggyback:2935]  Out: 1050 (10.7 mL/kg) [Urine:1050 (0.3 mL/kg/hr)]  Weight: 98.3 kg     Past Cardiology Tests (Last 3 Years):  EKG:  ECG 12 Lead 05/09/2024      Electrocardiogram, 12-lead PRN ACS symptoms 05/07/2024      ECG 12 lead 04/26/2024      ECG 12 lead 03/20/2024      ECG 12 lead 03/19/2024      ECG 12 lead 03/09/2024      Electrocardiogram, 12-lead 03/09/2024      ECG 12 lead 02/14/2024      ECG 12 lead 01/19/2024      ECG 12 lead 01/18/2024      Electrocardiogram, 12-lead PRN ACS symptoms 01/18/2024      ECG 12 lead 01/18/2024      ECG 12 lead 01/14/2024      ECG 12 lead 01/14/2024      ECG 12 lead 12/17/2023      Electrocardiogram, 12-lead PRN ACS symptoms 11/30/2023      ECG 12 lead 11/30/2023      ECG 12 Lead       ECG 12 lead 10/20/2023    Echo:  Transthoracic Echo (TTE) Complete 03/11/2024    Ejection Fractions:  EF   Date/Time Value Ref Range Status   03/11/2024 09:59 AM 43 %      Cath:  No results found for this or any previous visit from the past 1095 days.    Stress Test:  No results found for this or any previous visit from the past 1095 days.    Cardiac Imaging:  No results found for this or any previous visit from the past 1095 days.      Past Medical History:  She has a past medical history of Abnormal kidney function (04/04/2023), Acute headache (03/10/2024), Acute low back pain (10/30/2023), Acute upper respiratory infection, unspecified (03/04/2020), Acute upper respiratory infection, unspecified (09/30/2015), Arthritis, Atypical facial pain (03/10/2024), Body mass index (BMI) 23.0-23.9, adult (10/15/2021), Body mass index (BMI) 33.0-33.9, adult (03/04/2020), Cardiomegaly (08/27/2013), Chest pain (06/10/2022), Chronic kidney disease, stage 3 unspecified (Multi) (07/02/2013), Confusional state (03/10/2024), Contact with and (suspected) exposure to covid-19 (04/04/2023), Delirium  (03/10/2024), Disease of pericardium, unspecified ([facility]) (07/02/2013), Encounter for follow-up examination after completed treatment for conditions other than malignant neoplasm (10/06/2022), Generalized contraction of visual field, right eye (01/29/2015), Hearing loss (03/10/2024), History of cataract (03/10/2024), History of thrombocytopenia (03/10/2024), Homonymous bilateral field defects, right side (04/29/2016), Hypertensive chronic kidney disease with stage 1 through stage 4 chronic kidney disease, or unspecified chronic kidney disease (07/02/2013), Laceration without foreign body, left foot, initial encounter (07/03/2018), Localized edema (03/10/2024), Mass of skin (03/10/2024), Migraine with aura, not intractable, without status migrainosus (10/24/2022), Open wound (03/10/2024), Open wound of left foot (03/10/2024), Other conditions influencing health status (07/02/2013), Other conditions influencing health status (07/02/2013), Other conditions influencing health status (07/02/2013), Other conditions influencing health status (05/22/2015), Other conditions influencing health status (10/24/2022), Other conditions influencing health status (03/14/2022), Other long term (current) drug therapy (10/24/2022), Overweight with body mass index (BMI) 25.0-29.9 (03/10/2024), Pain of toe (03/10/2024), Personal history of COVID-19 (04/04/2023), Personal history of diseases of the blood and blood-forming organs and certain disorders involving the immune mechanism (07/02/2013), Personal history of diseases of the skin and subcutaneous tissue (08/11/2015), Personal history of nephrotic syndrome (07/02/2013), Personal history of other diseases of the circulatory system (08/27/2013), Personal history of other diseases of the nervous system and sense organs, Personal history of other diseases of the respiratory system, Personal history of other infectious and parasitic diseases (07/02/2013), Personal history of other  specified conditions, Personal history of other specified conditions (08/27/2013), Posterior epistaxis (03/10/2024), Puckering of macula, right eye (10/24/2022), Raynaud's syndrome without gangrene (07/02/2013), Sinusitis (03/10/2024), Systemic lupus erythematosus, unspecified (Multi) (07/24/2015), Systemic lupus erythematosus, unspecified (Multi) (07/24/2015), Systemic lupus erythematosus, unspecified (Multi) (07/24/2015), 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) (07/02/2013), Type 2 diabetes mellitus with mild nonproliferative diabetic retinopathy without macular edema, left eye (Multi) (07/27/2015), Type 2 diabetes mellitus with mild nonproliferative diabetic retinopathy without macular edema, unspecified eye (Multi) (07/24/2015), Type 2 diabetes mellitus with proliferative diabetic retinopathy without macular edema, right eye (Multi) (07/27/2015), Type 2 diabetes mellitus with proliferative diabetic retinopathy without macular edema, unspecified eye (Multi) (07/24/2015), Unspecified acute and subacute iridocyclitis (07/24/2015), and Unspecified open wound, left foot, sequela (07/03/2018).    Past Surgical History:  She has a past surgical history that includes Eye surgery (03/06/2015); Total hip arthroplasty (01/29/2015); Cholecystectomy (01/29/2015); Other surgical history (01/29/2015); Other surgical history (01/29/2015); Ankle surgery (01/29/2015); Foot surgery (01/29/2015); MR angio head wo IV contrast (7/26/2013); MR angio neck wo IV contrast (7/26/2013); CT guided percutaneous biopsy bone deep (5/4/2021); MR angio head wo IV contrast (9/17/2021); MR angio neck wo IV contrast (9/17/2021); MR angio neck wo IV contrast (3/25/2023); and MR angio head wo IV contrast (3/25/2023).      Social History:  She reports that she has never smoked. She has never been exposed to tobacco smoke. She has never used smokeless tobacco. She reports that she does not currently use alcohol. She reports that she  does not use drugs.    Family History:  Family History   Problem Relation Name Age of Onset    Other (RENAL DISEASE) Mother          END STAGE    Other (CARDIAC DISORDER) Mother      Cataracts Mother      Stroke Mother      Diabetes Mother      Kidney disease Mother      Lupus Mother      Other (CARDIAC DISORDER) Father      COPD Father      Glaucoma Father      Hypertension Father      Sleep apnea Father      Other (CARDIAC DISORDER) Sister      Depression Sister      Kidney disease Sister      Sickle cell trait Sister      Sleep apnea Daughter          Allergies:  Ace inhibitors, Hydroxychloroquine, Lisinopril, Penicillins, and Sulfa (sulfonamide antibiotics)    Inpatient Medications:  Scheduled medications   Medication Dose Route Frequency    acyclovir  10 mg/kg (Ideal) intravenous q8h    [Held by provider] apixaban  2.5 mg oral q12h    [Held by provider] atorvastatin  40 mg oral Daily    atropine        cefTRIAXone  2 g intravenous q24h    [Held by provider] fluticasone-umeclidin-vilanter  1 puff inhalation Daily    [Held by provider] folic acid  1 mg oral Daily    [Held by provider] heparin (porcine)  5,000 Units subcutaneous q8h    hydrocortisone sodium succinate  100 mg intravenous q8h    insulin lispro  0-10 Units subcutaneous q4h    ipratropium-albuteroL  3 mL nebulization TID    [Held by provider] levETIRAcetam  500 mg oral BID    levETIRAcetam  500 mg intravenous q12h    [Held by provider] melatonin  5 mg oral Nightly    [Held by provider] multivitamin with minerals  1 tablet oral Daily    [Held by provider] mycophenolate  1,000 mg oral BID    [Held by provider] OLANZapine  5 mg oral BID    pantoprazole  40 mg intravenous Daily    [Held by provider] predniSONE  15 mg oral Daily    [Held by provider] thiamine  100 mg oral Daily    vancomycin  1,250 mg intravenous q24h     PRN medications   Medication    acetaminophen    atropine    dextrose    dextrose    glucagon    hydrALAZINE    ipratropium-albuteroL     ondansetron    oxygen    vancomycin     Continuous Medications   Medication Dose Last Rate    lactated Ringer's  100 mL/hr 100 mL/hr (05/09/24 2957)     Outpatient Medications:  Current Outpatient Medications   Medication Instructions    acetaminophen (TYLENOL) 650 mg, oral, Every 4 hours PRN    acetaminophen (TYLENOL) 1,000 mg, oral, Every 4 hours PRN    albuterol 90 mcg/actuation inhaler 2 puffs, inhalation, Every 6 hours PRN    apixaban (ELIQUIS) 2.5 mg, oral, 2 times daily    atorvastatin (LIPITOR) 40 mg, oral, Daily    balsam peru-castor oiL (Venelex) ointment Topical, 2 times daily, APPLY TO BUTTOCKS, SACRUM, AND THIGHS.    blood-glucose sensor (Dexcom G6 Sensor) device Use to check sugars 3 times daily    Dexcom G4 platinum  (Dexcom G6 ) misc Use as instructed    Dexcom G4 platinum transmitter (Dexcom G6 Transmitter) device Use as instructed    fluticasone-umeclidin-vilanter (TRELEGY-ELLIPTA) 200-62.5-25 mcg blister with device 1 puff, inhalation, Daily    folic acid (Folvite) 1 mg tablet TAKE 1 TABLET BY MOUTH EVERY DAY    glucagon HCL (GLUCAGON (HCL) EMERGENCY KIT) 1 mg, intramuscular, As needed    guaiFENesin (MUCINEX) 1,200 mg, oral, 2 times daily, Do not crush, chew, or split.    insulin lispro (HumaLOG) 100 unit/mL injection subcutaneous, 3 times daily PRN, Take as directed per insulin Sliding Scale instructions.<BR>0-150 = 0 units<BR>151-200 = 2 units<BR>201-250 = 4 units<BR>251-300 = 6 units<BR>301-350 = 8 units<BR>351-400 = 10 units<BR>>400 = give 10 units and contact MD    Lantus U-100 Insulin 10 Units, subcutaneous, Every morning, Take as directed per insulin instructions.    levETIRAcetam (KEPPRA) 500 mg, oral, Every 12 hours    magnesium oxide (MAG-OX) 800 mg, oral, Daily    meclizine (ANTIVERT) 25 mg, oral, Every 12 hours PRN    melatonin 5 mg tablet 1 tablet, oral, Nightly    multivitamin with minerals tablet 1 tablet, oral, Daily    mycophenolate (CELLCEPT) 1,000 mg, oral, 2  "times daily    nut.tx.gluc intol,lf,soy/fiber (BOOST GLUCOSE CONTROL ORAL) 8 fluid ounces, oral, Every morning    OLANZapine (ZYPREXA) 5 mg, oral, 2 times daily    pantoprazole (ProtoNix) 40 mg EC tablet TAKE 1 TABLET BY MOUTH EVERY DAY    pen needle, diabetic 31 gauge x 5/16\" needle Use to inject 1-4 times daily as directed.    predniSONE (DELTASONE) 15 mg, oral, Daily    thiamine (VITAMIN B-1) 100 mg, oral, Daily       Physical Exam:  Vitals and nursing note reviewed.   Constitutional:       General: She is not in acute distress.     Appearance: She is ill-appearing.   HENT:      Head: Normocephalic and atraumatic.      Mouth/Throat:      Mouth: Mucous membranes are dry.   Eyes:      Pupils: Pupils are equal, round, and reactive to light.   Cardiovascular:      Rate and Rhythm: Bradycardia present.   Pulmonary:      Effort: Pulmonary effort is normal. No respiratory distress.      Breath sounds: Normal breath sounds.   Abdominal:      General: Abdomen is flat. There is no distension.      Tenderness: There is no abdominal tenderness.   Musculoskeletal:      Cervical back: Neck supple.      Right lower leg: No edema.      Left lower leg: No edema.   Skin:     General: Skin is warm and dry.      Capillary Refill: Capillary refill takes less than 2 seconds.   Neurological:      General: No focal deficit present.      Mental Status: She is disoriented.      Assessment/Plan   Clinical impression    1.  Marked sinus bradycardia  2.  Hypertension  3.  Lupus  4.  Chronic kidney disease  5.  Anemia  6.  History of coronary artery disease with coronary artery bypass graft surgery in 2013  7.  History of atrial fibrillation on Eliquis therapy  8.  History of adrenal insufficiency  9.  Cardiomyopathy with a left ventricular ejection fraction n 40% per echocardiogram in March. 24        Peripheral IV 05/07/24 20 G Right Antecubital (Active)   Site Assessment Clean;Dry;Intact 05/09/24 0900   Dressing Status Dry;Clean 05/09/24 " 0900   Number of days: 2       Peripheral IV 05/07/24 20 G Distal;Right;Anterior Forearm (Active)   Site Assessment Clean;Dry;Intact 05/09/24 0900   Dressing Status Dry;Clean 05/09/24 0900   Number of days: 2       Code Status:  Full Code    I spent 60 minutes in the professional and overall care of this patient.        Antonio Rodriges MD

## 2024-05-09 NOTE — PROGRESS NOTES
Pt undergoing EEG on this date and LP at bedside.  Will continue to follow for dc needs.  Plan remains return to [facility] when medically ready      Sheri Phan RN

## 2024-05-09 NOTE — DOCUMENTATION CLARIFICATION NOTE
"    PATIENT:               LISBET GUTIERRES  ACCT #:                  [id number]  MRN:                       [id number]  :                       1960  ADMIT DATE:       2024 1:03 PM  DISCH DATE:  RESPONDING PROVIDER #:        [id number]          PROVIDER RESPONSE TEXT:    Metabolic encephalopathy 2/2 Adrenal insufficiency    CDI QUERY TEXT:    Clarification    Instruction:    Based on your assessment of the patient and the clinical information, please provide the requested documentation by clicking on the appropriate radio button and enter any additional information if prompted.    Question: Please further clarify the most likely etiology of the altered mental status as    When answering this query, please exercise your independent professional judgment. The fact that a question is being asked, does not imply that any particular answer is desired or expected.    The patient's clinical indicators include:  Clinical Information: 62 yo female admitted with altered mental status, pneumonia, UTI, COPDe, and adrenal insufficiency.    Clinical Indicators:  Vitals ( 1355) Temp-36.9 HR-63 RR-16 BP-127/65 SPO2-92 RA  WBC:3.0 ( 1433), 3.6 ( 0457)  Urine culture: pending  GCS:12    vEEG are indicative of a severe diffuse encephalopathy (Significant event note by Dr. Banuelos )    CXR: () FINDINGS:  Mild diffuse interstitial infiltrates are seen bilaterally, and may  represent edema and/or pneumonia. No pneumothorax is identified. The  cardiac silhouette is mildly prominent, similar to prior studies.     PN Dr. Mckeon: \"Change ABX to Ceftriaxone and add Linezolid (VRE in urine in past). Follow up culture.\"  \"Acute encephalopathy: Multiple presentations of the same, believed to be due to adrenal insufficiency. Rule out septic encephalopathy\"    Treatment: Ceftriaxone, Linezolid, Neuro consult    Risk Factors: PNA, UTI, Adrenal insufficiency  Options provided:  -- Metabolic encephalopathy 2/2 UTI, PNA  -- " Metabolic encephalopathy 2/2 Adrenal insufficiency  -- Other - I will add my own diagnosis  -- Refer to Clinical Documentation Reviewer    Query created by: Elvira Rueda on 5/8/2024 1:36 PM      Electronically signed by:  SAMANTHA ANGELES DO 5/9/2024 6:47 AM

## 2024-05-09 NOTE — CONSULTS
"Vancomycin Dosing by Pharmacy- INITIAL    Bing Holliday is a 63 y.o. year old female who Pharmacy has been consulted for vancomycin dosing for CNS/meningoencephalitis. Based on the patient's indication and renal status this patient will be dosed based on a goal AUC of 500-600.     Renal function is currently stable.    Visit Vitals  /90   Pulse (!) 36   Temp 36.5 °C (97.7 °F) (Axillary)   Resp 11        Lab Results   Component Value Date    CREATININE 1.70 (H) 05/09/2024    CREATININE 1.70 (H) 05/08/2024    CREATININE 1.60 (H) 05/08/2024    CREATININE 1.53 (H) 05/07/2024        Patient weight is No results found for: \"PTWEIGHT\"    No results found for: \"CULTURE\"     I/O last 3 completed shifts:  In: 6813.7 (69.3 mL/kg) [I.V.:3878.7 (39.5 mL/kg); IV Piggyback:2935]  Out: 1050 (10.7 mL/kg) [Urine:1050 (0.3 mL/kg/hr)]  Weight: 98.3 kg   [unfilled]    Lab Results   Component Value Date    PATIENTTEMP  05/07/2024      Comment:      NOTE: Patient Results are Not Corrected for Temperature    PATIENTTEMP  05/07/2024      Comment:      NOTE: Patient Results are Not Corrected for Temperature    PATIENTTEMP  04/24/2024      Comment:      NOTE: Patient Results are Not Corrected for Temperature          Assessment/Plan     Patient was given a loading dose of 1,250 mg x1 on 5/7/24 @2114.  Will initiate vancomycin maintenance, 1,250 mg q24hr.    This dosing regimen is predicted by [employer] to result in the following pharmacokinetic parameters:  Regimen: 1250 mg IV every 24 hours.  Start time: 09:14 on 05/08/2024  Exposure target: AUC24 (range)400-600 mg/L.hr   AUC24,ss: 579 mg/L.hr  Probability of AUC24 > 400: 98 %  Ctrough,ss: 15.7 mg/L  Probability of Ctrough,ss > 20: 20 %  Probability of nephrotoxicity (Lodise YUDY 2009): 11 %    Follow-up level will be ordered on 5/10/24 at 0500, unless clinically indicated sooner.  Will continue to monitor renal function daily while on vancomycin and order serum creatinine at " least every 48 hours if not already ordered.  Follow for continued vancomycin needs, clinical response, and signs/symptoms of toxicity.     Kristyn Mosqueda, PharmD

## 2024-05-09 NOTE — PROGRESS NOTES
[facility] Critical Care Medicine       Date:  5/9/2024  Patient:  Bing Holliday  YOB: 1960  MRN:  [id number]   Admit Date:  5/7/2024  ========================================================================================================    Chief Complaint   Patient presents with    Altered Mental Status         History of Present Illness:  Bing Holliday is a 63 y.o. 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  History somewhat limited as the patient, while she awakens and is able to answer simple questions, is unable to offer any meaningful history.     Extensive chart review undertaken.  Patient with multiple presentations of the same.  Admitted from 1/17 - 1-28 to [facility] for similar presentation.  Underwent extensive workup there at that time, was treated with stress to steroids, rheumatology consultation who do not believe that she was in acute lupus flare, endocrinology consultation, as well as neurology consultation.  During that admission there was concern for potential seizure-like activity, the patient underwent a video EEG, was found to be in status, and was loaded with Keppra and continued on maintenance dosing.  She ultimately was discharged to skilled nursing facility for ongoing care.  At the time of discharge her noted prednisone dosing was 15 mg daily.     She was then readmitted to the hospital 3/9 through 3/14 with hypothermia, hyponatremia, altered mental status, and hypoglycemia.  She again underwent an extensive workup, was seen by endocrinology, manage on stress dose steroids, no infectious etiology elucidated  despite a very exhaustive search, she improved, and at that time she was discharged on what is documented as 20 mg of prednisone daily.     She then was readmitted to the hospital 3/19 through 3/26 with almost identical presentation of hypothermia, hyponatremia, hypoglycemia.  She was treated with stress dose steroids, underwent an MRI of the brain which was unrevealing, she returned to her baseline, and was discharged back to her skilled nursing facility.  At that time she was discharged home and is reported to be 20 mg of prednisone daily.     She then was readmitted to the hospital 4/24 to 4/30 for exactly the same presentation including hypoglycemia, hallucinations, hypothermia, and altered mental status.  And was treated in a very similar fashion including stress dose steroids.  She underwent an infectious workup, was treated for community-acquired pneumonia, and ultimately improved.  She was discharged with what was reported to be 20 mg of prednisone daily.     She presents today with hyperglycemia.  She received glucagon prior to arrival.  In the emergency department she was found to be leukopenic, hyponatremic, with a hemoglobin of 7.8 which is near her baseline hemoglobin.  She was given 1 g of ceftriaxone, 100 mg of hydrocortisone, and placed on a Allan hugger.  She was referred to the ICU for admission given her multiple medical comorbidities.       I was able to discuss case with the patient's skilled nursing facility, they note that she is getting 15 mg of prednisone daily. The recent discharge summary is from her prior admissions, the patient was to be discharged on 20 mg of prednisone as this is a dose that she has been well on.          Interval ICU Events:     5/7: Patient admitted to the ICU.  Seen and examined in the emergency department.  Somnolent but arouses to voice and follows simple commands.  Is without complaint.  Attempted to reach out to the patient's daughter who did not answer the  phone.    5/8: No acute overnight events.  Patient remains easily arousable to voice but minimally interactive.  Hypothermia improving.  Hypoglycemia resolved.  Creatinine stable.  Urinalysis consistent with urinary tract infection.  Endocrinology consultation completed, agree with stress dose steroids.  EEG to be performed this morning.  Keppra level therapeutic.  Procalcitonin pending.    5/9: No acute overnight events.  Asymptomatic bradycardia noted on telemetry.  Patient becoming more responsive, agitated at times.  Discussed with son at bedside yesterday, notes that this is normal progression of her recovery when she is been hospitalized before.  No seizure activity reported on EEG.  Neurology consultation completed, recommending EEG, MRI when able.  Hypothermia improved, off Allan hugger.  Remains on stress dose steroids.  Urine culture finalized as negative.  Blood cultures prelim negative.  Urine antigens negative.  Anemic this morning with a hemoglobin of 6.9, 1 unit PRBCs ordered.    Medical History:  Past Medical History:   Diagnosis Date    Abnormal kidney function 04/04/2023    Acute headache 03/10/2024    Acute low back pain 10/30/2023    Acute upper respiratory infection, unspecified 03/04/2020    Acute URI    Acute upper respiratory infection, unspecified 09/30/2015    URTI (acute upper respiratory infection)    Arthritis     Atypical facial pain 03/10/2024    Body mass index (BMI) 23.0-23.9, adult 10/15/2021    BMI 23.0-23.9, adult    Body mass index (BMI) 33.0-33.9, adult 03/04/2020    BMI 33.0-33.9,adult    Cardiomegaly 08/27/2013    Left ventricular hypertrophy    Chest pain 06/10/2022    Comment on above: Added by Problem List Migration; 2013-3-12; Moved to Suppressed Nov 25 2013 9:16PM; Comment on above: Added by Problem List Migration; 2013-3-12; Moved to Suppressed Nov 25 2013 9:16PM;    Chronic kidney disease, stage 3 unspecified (Multi) 07/02/2013    Chronic kidney disease, stage III  (moderate)    Confusional state 03/10/2024    Contact with and (suspected) exposure to covid-19 04/04/2023    Delirium 03/10/2024    Disease of pericardium, unspecified ([facility]) 07/02/2013    Pericardial disease    Encounter for follow-up examination after completed treatment for conditions other than malignant neoplasm 10/06/2022    Hospital discharge follow-up    Generalized contraction of visual field, right eye 01/29/2015    Generalized contraction of visual field of right eye    Hearing loss 03/10/2024    History of cataract 03/10/2024    History of thrombocytopenia 03/10/2024    Comment on above: Added by Problem List Migration; 2013-7-2;    Homonymous bilateral field defects, right side 04/29/2016    Homonymous bilateral field defects of right side    Hypertensive chronic kidney disease with stage 1 through stage 4 chronic kidney disease, or unspecified chronic kidney disease 07/02/2013    Nephrosclerosis    Laceration without foreign body, left foot, initial encounter 07/03/2018    Foot laceration, left, initial encounter    Localized edema 03/10/2024    Mass of skin 03/10/2024    Migraine with aura, not intractable, without status migrainosus 10/24/2022    Ocular migraine    Open wound 03/10/2024    Open wound of left foot 03/10/2024    Other conditions influencing health status 07/02/2013    Chronic Glomerulonephritis In Diseases Classified Elsewhere    Other conditions influencing health status 07/02/2013    Progressive Familial Myoclonic Epilepsy    Other conditions influencing health status 07/02/2013    Protein S Deficiency    Other conditions influencing health status 05/22/2015    Familial Combined Hyperlipidemia    Other conditions influencing health status 10/24/2022    IDDM (insulin dependent diabetes mellitus)    Other conditions influencing health status 03/14/2022    Diabetes mellitus, insulin dependent (IDDM), uncontrolled    Other long term (current) drug therapy 10/24/2022    Long-term use  of Plaquenil    Overweight with body mass index (BMI) 25.0-29.9 03/10/2024    Pain of toe 03/10/2024    Personal history of COVID-19 04/04/2023    Personal history of diseases of the blood and blood-forming organs and certain disorders involving the immune mechanism 07/02/2013    History of thrombocytopenia    Personal history of diseases of the skin and subcutaneous tissue 08/11/2015    History of foot ulcer    Personal history of nephrotic syndrome 07/02/2013    History of nephrotic syndrome    Personal history of other diseases of the circulatory system 08/27/2013    History of sinus tachycardia    Personal history of other diseases of the nervous system and sense organs     History of cataract    Personal history of other diseases of the respiratory system     History of bronchitis    Personal history of other infectious and parasitic diseases 07/02/2013    History of hepatitis    Personal history of other specified conditions     History of shortness of breath    Personal history of other specified conditions 08/27/2013    History of edema    Posterior epistaxis 03/10/2024    Puckering of macula, right eye 10/24/2022    ERM OD (epiretinal membrane, right eye)    Raynaud's syndrome without gangrene 07/02/2013    Raynaud's disease    Sinusitis 03/10/2024    Systemic lupus erythematosus, unspecified (Multi) 07/24/2015    SLE (systemic lupus erythematosus)    Systemic lupus erythematosus, unspecified (Multi) 07/24/2015    SLE (systemic lupus erythematosus)    Systemic lupus erythematosus, unspecified (Multi) 07/24/2015    Systemic lupus    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) 07/02/2013    Type 2 diabetes with nephropathy    Type 2 diabetes mellitus with mild nonproliferative diabetic retinopathy without macular edema, left eye (Multi) 07/27/2015    Non-proliferative diabetic retinopathy, left eye    Type 2 diabetes mellitus with mild nonproliferative diabetic retinopathy without macular  edema, unspecified eye (Multi) 07/24/2015    Mild non proliferative diabetic retinopathy    Type 2 diabetes mellitus with proliferative diabetic retinopathy without macular edema, right eye (Multi) 07/27/2015    Proliferative diabetic retinopathy of right eye    Type 2 diabetes mellitus with proliferative diabetic retinopathy without macular edema, unspecified eye (Multi) 07/24/2015    Diabetic proliferative retinopathy    Unspecified acute and subacute iridocyclitis 07/24/2015    Acute iritis, right eye    Unspecified open wound, left foot, sequela 07/03/2018    Wound, open, foot, left, sequela     Past Surgical History:   Procedure Laterality Date    ANKLE SURGERY  01/29/2015    Ankle Surgery    CHOLECYSTECTOMY  01/29/2015    Cholecystectomy    CT GUIDED PERCUTANEOUS BIOPSY BONE DEEP  5/4/2021    CT GUIDED PERCUTANEOUS BIOPSY BONE DEEP 5/4/2021 [facility] CLINICAL LEGACY    EYE SURGERY  03/06/2015    Eye Surgery    FOOT SURGERY  01/29/2015    Foot Surgery    MR HEAD ANGIO WO IV CONTRAST  7/26/2013    MR HEAD ANGIO WO IV CONTRAST 7/26/2013 [facility] CLINICAL LEGACY    MR HEAD ANGIO WO IV CONTRAST  9/17/2021    MR HEAD ANGIO WO IV CONTRAST 9/17/2021 [facility]GAC[facility]    MR HEAD ANGIO WO IV CONTRAST  3/25/2023    MR HEAD ANGIO WO IV CONTRAST [facility]    MR NECK ANGIO WO IV CONTRAST  7/26/2013    MR NECK ANGIO WO IV CONTRAST 7/26/2013 [facility] CLINICAL LEGACY    MR NECK ANGIO WO IV CONTRAST  9/17/2021    MR NECK ANGIO WO IV CONTRAST 9/17/2021 [facility]    MR NECK ANGIO WO IV CONTRAST  3/25/2023    MR NECK ANGIO WO IV CONTRAST [facility]    OTHER SURGICAL HISTORY  01/29/2015    Creation Of Pericardial Window    OTHER SURGICAL HISTORY  01/29/2015    Quadricepsplasty    TOTAL HIP ARTHROPLASTY  01/29/2015    Hip Replacement     Medications Prior to Admission   Medication Sig Dispense Refill Last Dose    apixaban (Eliquis) 2.5 mg tablet Take 1 tablet (2.5 mg) by mouth 2 times a day. 60 tablet 1 5/7/2024 at 0900     fluticasone-umeclidin-vilanter (TRELEGY-ELLIPTA) 200-62.5-25 mcg blister with device Inhale 1 puff once daily. 60 each 5 5/7/2024 at 0900    folic acid (Folvite) 1 mg tablet TAKE 1 TABLET BY MOUTH EVERY DAY 90 tablet 1 5/7/2024 at 0900    glucagon HCL (Glucagon, HCl, Emergency Kit) 1 mg recon soln Inject 1 mg into the muscle if needed.   5/7/2024 at 1225    guaiFENesin (Mucinex) 600 mg 12 hr tablet Take 2 tablets (1,200 mg) by mouth 2 times a day. Do not crush, chew, or split.   5/7/2024 at 0900    insulin glargine (Lantus U-100 Insulin) 100 unit/mL injection Inject 10 Units under the skin once daily in the morning. Take as directed per insulin instructions.   5/7/2024 at 0800    insulin lispro (HumaLOG) 100 unit/mL injection Inject under the skin 3 times a day as needed for high blood sugar (before meals). Take as directed per insulin Sliding Scale instructions.  0-150 = 0 units  151-200 = 2 units  201-250 = 4 units  251-300 = 6 units  301-350 = 8 units  351-400 = 10 units  >400 = give 10 units and contact MD   5/7/2024 at 0730- 8 UNITS GIVEN    levETIRAcetam (Keppra) 500 mg tablet Take 1 tablet (500 mg) by mouth every 12 hours. 60 tablet 0 5/7/2024 at 0900    magnesium oxide (Mag-Ox) 400 mg (241.3 mg magnesium) tablet Take 2 tablets (800 mg) by mouth once daily. Do not fill before May 1, 2024.   5/7/2024 at 0900    multivitamin with minerals tablet Take 1 tablet by mouth once daily.   5/7/2024 at 0900    mycophenolate (Cellcept) 500 mg tablet TAKE 2 TABLETS BY MOUTH TWICE A  tablet 0 5/7/2024 at 0900    nut.tx.gluc intol,lf,soy/fiber (BOOST GLUCOSE CONTROL ORAL) Take 8 fluid ounces by mouth once daily in the morning.   5/7/2024 at 0900    OLANZapine (ZyPREXA) 5 mg tablet Take 1 tablet (5 mg) by mouth 2 times a day.   5/7/2024 at 0900    pantoprazole (ProtoNix) 40 mg EC tablet TAKE 1 TABLET BY MOUTH EVERY DAY 90 tablet 1 5/7/2024 at 0800    predniSONE (Deltasone) 5 mg tablet Take 3 tablets (15 mg) by mouth  "once daily.   5/7/2024 at 0900    thiamine 100 mg tablet Take 1 tablet (100 mg) by mouth once daily.   5/7/2024 at 0900    acetaminophen (Tylenol) 325 mg tablet Take 2 tablets (650 mg) by mouth every 4 hours if needed for mild pain (1 - 3), moderate pain (4 - 6) or fever (temp greater than 38.0 C).   5/2/2024    acetaminophen (Tylenol) 500 mg tablet Take 2 tablets (1,000 mg) by mouth every 4 hours if needed for mild pain (1 - 3) or moderate pain (4 - 6).   Unknown    albuterol 90 mcg/actuation inhaler Inhale 2 puffs every 6 hours if needed for shortness of breath or wheezing.   Unknown    atorvastatin (Lipitor) 40 mg tablet Take 1 tablet (40 mg) by mouth once daily. (Patient taking differently: Take 1 tablet (40 mg) by mouth once daily at bedtime.) 90 tablet 3 5/6/2024 at 2100    balsam peru-castor oiL (Venelex) ointment Apply topically 2 times a day. APPLY TO BUTTOCKS, SACRUM, AND THIGHS.   5/6/2024 at PM    blood-glucose sensor (Dexcom G6 Sensor) device Use to check sugars 3 times daily 4 each 2     Dexcom G4 platinum  (Dexcom G6 ) misc Use as instructed 1 each 0     Dexcom G4 platinum transmitter (Dexcom G6 Transmitter) device Use as instructed 1 each 0     meclizine (Antivert) 25 mg tablet Take 1 tablet (25 mg) by mouth every 12 hours if needed for dizziness.   Unknown    melatonin 5 mg tablet Take 1 tablet (5 mg) by mouth once daily at bedtime.   5/6/2024 at 2100    pen needle, diabetic 31 gauge x 5/16\" needle Use to inject 1-4 times daily as directed. 100 each 11      Ace inhibitors, Hydroxychloroquine, Lisinopril, Penicillins, and Sulfa (sulfonamide antibiotics)  Social History     Tobacco Use    Smoking status: Never     Passive exposure: Never    Smokeless tobacco: Never   Vaping Use    Vaping status: Never Used   Substance Use Topics    Alcohol use: Not Currently     Comment: RARE    Drug use: Never     Family History   Problem Relation Name Age of Onset    Other (RENAL DISEASE) Mother      "     END STAGE    Other (CARDIAC DISORDER) Mother      Cataracts Mother      Stroke Mother      Diabetes Mother      Kidney disease Mother      Lupus Mother      Other (CARDIAC DISORDER) Father      COPD Father      Glaucoma Father      Hypertension Father      Sleep apnea Father      Other (CARDIAC DISORDER) Sister      Depression Sister      Kidney disease Sister      Sickle cell trait Sister      Sleep apnea Daughter         Review of Systems:  14 point review of systems was completed and negative except for those specially mention in my HPI    Physical Exam:    Heart Rate:  [36-80]   Temp:  [35.2 °C (95.4 °F)-36.4 °C (97.5 °F)]   Resp:  [10-85]   BP: (104-189)/()   Weight:  [98.3 kg (216 lb 11.4 oz)]   SpO2:  [91 %-100 %]     Physical Exam  Vitals and nursing note reviewed.   Constitutional:       General: She is not in acute distress.     Appearance: She is ill-appearing.   HENT:      Head: Normocephalic and atraumatic.      Mouth/Throat:      Mouth: Mucous membranes are dry.   Eyes:      Pupils: Pupils are equal, round, and reactive to light.   Cardiovascular:      Rate and Rhythm: Bradycardia present.   Pulmonary:      Effort: Pulmonary effort is normal. No respiratory distress.      Breath sounds: Normal breath sounds.   Abdominal:      General: Abdomen is flat. There is no distension.      Tenderness: There is no abdominal tenderness.   Musculoskeletal:      Cervical back: Neck supple.      Right lower leg: No edema.      Left lower leg: No edema.   Skin:     General: Skin is warm and dry.      Capillary Refill: Capillary refill takes less than 2 seconds.   Neurological:      General: No focal deficit present.      Mental Status: She is disoriented.         Objective:    I have reviewed all medications, laboratory results, and imaging pertinent for today's encounter    Assessment/Plan:    I am currently managing this critically ill patient for the following problems:    Acute encephalopathy: Multiple  presentations of the same, believed to be due to adrenal insufficiency.  Rule out septic encephalopathy  History of lupus cerebritis  History of seizure (believed to be focal seizure in nature based on chart review)  Hypoxia on supplemental O2   History of coronary artery disease  History of atrial fibrillation on Eliquis  Hx of heart failure with reduced EF not in acute exacerbation   History of GERD  Recurrent hypoglycemic episodes believed to be due to adrenal insufficiency - possibly due to med rec error.   History of type 2 diabetes now with mild hyperglycemia   History of adrenal insufficiency  Chronic anemia  History of lupus  Immune suppressed     Neuro/Psych/Pain Ctrl/Sedation:  Tylenol available for pain control  Hold Zyprexa   Continue Keppra given her history of seizure, level therapeutic   EEG report pending    Neuro recs reviewed: EEG/MRI when able   Will consider LP in the future however suspicion for meningitis low.      Respiratory/ENT:  Maintain SpO2 greater than 90%, currently on 2 L nasal cannula  DuoNebs Q6  Home inhalers       Cardiovascular:  Maintain MAP greater than 65, not currently on any vasoactive agents  Hold Statin  Has been unable to tolerate GDMT in the past due to allergies and other comorbidities  ASA given hx of CAD   Hold Eliquis (NPO) - will defer AC currently given decline in HGB  Bradycardia asymptomatic - observe for now      GI:  NPO pending improvement in mental status   Home PPI     Renal/Volume Status (Intra & Extravascular):  Lytes/UOP Acceptable - no need for RRT  LR @ 100    Endocrine  Stress dose steroids 100mg Q8  ENDO following for steroid dosing   Should be on at least 20mg Prednisone NOT 15mg when acute process resolved      Infectious Disease:  DC Linezolid (Urine cx negative)  Continue Ceftriaxone pending final blood culture data     Heme/Onc:  1U PRBCs today - repeat post transfusion   Hold MMF      Ethics/Code Status:  Full Code     :  DVT  Prophylaxis: Heparin  GI Prophylaxis: PPI  Diet: NPO  CVC: NA  [place]: NA  [place]: ANDREW  Restraints: NA  Dispo: Floor     I have personally spent 35 minutes of critical care time, exclusive of time spent on any procedures, in evaluation and management of this critically ill patient        Hay Mckeon DO

## 2024-05-09 NOTE — H&P (VIEW-ONLY)
Inpatient consult to Cardiology  Consult performed by: Antonio Rodriges MD  Consult ordered by: Hay Mckeon DO  Reason for consult: Bradycardia  Assessment/Recommendations: Patient was seen, chart reviewed.    Patient has a history of lupus complicated with cerebritis on immunosuppressant and also prednisone.    For the last 3 months patient had multiple admissions for episodes of hypothermia, hypotension and also hyponatremia.  All these episodes were associated with altered mental status.  Patient was readmitted again with the same problems.  On arrival her EKG was consistent with sinus rhythm.  Looking at her records also in hospitalizations in the past also she tried to be bradycardic during admission.  In the last 24 hours her heart rate on telemetry has been showing as low as 32 bpm.  Blood pressure stable above 150 mmHg systolic.    Will continue with telemetry for now.    If heart rate drops less than 30 bpm patient should start IV dopamine.  Most likely bradycardia due to CNS.  Agree with lumbar puncture.  Hold Eliquis therapy.  Apparently patient has a history of atrial fibrillation.  Avoid AV camille blocking agents  Keep potassium equal more than 4.0 magnesium equal more than 2.0.      Please excuse any errors in grammar or translation related to this dictation.  Voice recognition software was utilized to prepare this document.        History Of Present Illness:    Bing Holliday is a 63 y.o. female presenting with mental status changes. Past Medical History of lupus complicated by cerebritis currently on CellCept and prednisone, recurrent adrenal insufficiency, CKD with a baseline creatinine of 1.5-1.9, COPD, GERD, atrial fibrillation on Eliquis, coronary artery disease s/p CABG in 2013, history of AAA     Patient had multiple admissions for hyper thermia, hyponatremia and also mental status changes.  She was treated with steroid therapy.  She has a history of adrenal insufficiency described  above.    Patient was admitted for hypoglycemia.  She also was found to be hyponatremic and also with anemia with a hemoglobin of 7.8.  Chronic hemoglobin 6.9.  She has been treated also for possible UTI with antibiotic therapy.      EKG on arrival shows sinus rhythm at a rate of 65 bpm QRS ration 100 ms QT corrected 165 ms.  The last 24 hours, patient has been bradycardic with heart rates as low as 32 bpm.  EKG performed today shows sinus bradycardia at a rate of 39 bpm QRS ration 102 ms QT corrected 440 ms.  Her laboratory on admission shows sodium 136 potassium 4.0 BUN 29 creatinine 1.53 GFR of 38 ALT 23 AST 24 troponin 15 TSH 2.79.  WBC 3.0.  Hemoglobin 7.8.  Hematocrit 26.3.  Platelet count 120.  Hemoglobin today 6.9.    Looking at her records.  EKGs during admission also has been showing marked sinus bradycardia with rates as low as 40 bpm.      Echocardiogram March 2024   CONCLUSIONS:   1. Left ventricular systolic function is mildly decreased with a 40-45% estimated ejection fraction.   2. There is low normal right ventricular systolic function.   3. Mild to moderate mitral valve regurgitation.   4. Mild to moderately elevated right ventricular systolic pressure.   5. Aortic valve stenosis is not present.   6. There is global hypokinesis of the left ventricle with minor regional variations  Last Recorded Vitals:  Vitals:    05/09/24 1400 05/09/24 1430 05/09/24 1500 05/09/24 1510   BP: (!) 173/92 96/65 (!) 127/107 170/85   Pulse: (!) 34 (!) 34 60 (!) 36   Resp: 10 10 (!) 27 12   Temp: 36.2 °C (97.2 °F)      TempSrc:       SpO2: 100% 100% 95% 100%   Weight:       Height:           Last Labs:  CBC - 5/9/2024:  3:59 AM  4.1 6.9 97    23.2      CMP - 5/9/2024:  3:59 AM  7.9 4.9 17 --- 0.2   2.8 2.5 19 114      PTT - 1/17/2024:  9:15 AM  1.0   11.5 37     Troponin I, High Sensitivity   Date/Time Value Ref Range Status   05/07/2024 02:33 PM 15 (H) 0 - 13 ng/L Final   03/20/2024 12:51 AM 23 (H) 0 - 13 ng/L Final    03/19/2024 11:28 PM 22 (H) 0 - 13 ng/L Final     BNP   Date/Time Value Ref Range Status   03/19/2024 11:28  (H) 0 - 99 pg/mL Final   03/20/2023 11:31 PM 98 0 - 99 pg/mL Final     Comment:     .  <100 pg/mL - Heart failure unlikely  100-299 pg/mL - Intermediate probability of acute heart  .               failure exacerbation. Correlate with clinical  .               context and patient history.    >=300 pg/mL - Heart Failure likely. Correlate with clinical  .               context and patient history.  BNP testing is performed using different testing   methodology at [facility] than at other   system [facility]. Direct result comparisons should   only be made within the same method.     12/09/2022 04:59 PM 21 0 - 99 pg/mL Final     Comment:     .  <100 pg/mL - Heart failure unlikely  100-299 pg/mL - Intermediate probability of acute heart  .               failure exacerbation. Correlate with clinical  .               context and patient history.    >=300 pg/mL - Heart Failure likely. Correlate with clinical  .               context and patient history.  BNP testing is performed using different testing   methodology at [facility] than at other   [facility]. Direct result comparisons should   only be made within the same method.       Hemoglobin A1C   Date/Time Value Ref Range Status   04/24/2024 10:06 AM 8.4 (H) see below % Final   02/08/2024 03:35 AM 8.0 (H) 4.3 - 5.6 % Final     Comment:     American Diabetes Association guidelines indicate that patients with HgbA1c in the range 5.7-6.4% are at increased risk for development of diabetes, and intervention by lifestyle modification may be beneficial. HgbA1c greater or equal to 6.5% is considered diagnostic of diabetes.   01/24/2024 08:34 AM 7.1 (H) see below % Final     VLDL   Date/Time Value Ref Range Status   03/24/2023 10:53 AM 16 0 - 40 mg/dL Final   09/18/2021 06:00 AM 25 0 - 40 mg/dL Final   05/05/2021 06:15 AM 10 0 - 40  mg/dL Final      Last I/O:  I/O last 3 completed shifts:  In: 6813.7 (69.3 mL/kg) [I.V.:3878.7 (39.5 mL/kg); IV Piggyback:2935]  Out: 1050 (10.7 mL/kg) [Urine:1050 (0.3 mL/kg/hr)]  Weight: 98.3 kg     Past Cardiology Tests (Last 3 Years):  EKG:  ECG 12 Lead 05/09/2024      Electrocardiogram, 12-lead PRN ACS symptoms 05/07/2024      ECG 12 lead 04/26/2024      ECG 12 lead 03/20/2024      ECG 12 lead 03/19/2024      ECG 12 lead 03/09/2024      Electrocardiogram, 12-lead 03/09/2024      ECG 12 lead 02/14/2024      ECG 12 lead 01/19/2024      ECG 12 lead 01/18/2024      Electrocardiogram, 12-lead PRN ACS symptoms 01/18/2024      ECG 12 lead 01/18/2024      ECG 12 lead 01/14/2024      ECG 12 lead 01/14/2024      ECG 12 lead 12/17/2023      Electrocardiogram, 12-lead PRN ACS symptoms 11/30/2023      ECG 12 lead 11/30/2023      ECG 12 Lead       ECG 12 lead 10/20/2023    Echo:  Transthoracic Echo (TTE) Complete 03/11/2024    Ejection Fractions:  EF   Date/Time Value Ref Range Status   03/11/2024 09:59 AM 43 %      Cath:  No results found for this or any previous visit from the past 1095 days.    Stress Test:  No results found for this or any previous visit from the past 1095 days.    Cardiac Imaging:  No results found for this or any previous visit from the past 1095 days.      Past Medical History:  She has a past medical history of Abnormal kidney function (04/04/2023), Acute headache (03/10/2024), Acute low back pain (10/30/2023), Acute upper respiratory infection, unspecified (03/04/2020), Acute upper respiratory infection, unspecified (09/30/2015), Arthritis, Atypical facial pain (03/10/2024), Body mass index (BMI) 23.0-23.9, adult (10/15/2021), Body mass index (BMI) 33.0-33.9, adult (03/04/2020), Cardiomegaly (08/27/2013), Chest pain (06/10/2022), Chronic kidney disease, stage 3 unspecified (Multi) (07/02/2013), Confusional state (03/10/2024), Contact with and (suspected) exposure to covid-19 (04/04/2023), Delirium  (03/10/2024), Disease of pericardium, unspecified ([facility]) (07/02/2013), Encounter for follow-up examination after completed treatment for conditions other than malignant neoplasm (10/06/2022), Generalized contraction of visual field, right eye (01/29/2015), Hearing loss (03/10/2024), History of cataract (03/10/2024), History of thrombocytopenia (03/10/2024), Homonymous bilateral field defects, right side (04/29/2016), Hypertensive chronic kidney disease with stage 1 through stage 4 chronic kidney disease, or unspecified chronic kidney disease (07/02/2013), Laceration without foreign body, left foot, initial encounter (07/03/2018), Localized edema (03/10/2024), Mass of skin (03/10/2024), Migraine with aura, not intractable, without status migrainosus (10/24/2022), Open wound (03/10/2024), Open wound of left foot (03/10/2024), Other conditions influencing health status (07/02/2013), Other conditions influencing health status (07/02/2013), Other conditions influencing health status (07/02/2013), Other conditions influencing health status (05/22/2015), Other conditions influencing health status (10/24/2022), Other conditions influencing health status (03/14/2022), Other long term (current) drug therapy (10/24/2022), Overweight with body mass index (BMI) 25.0-29.9 (03/10/2024), Pain of toe (03/10/2024), Personal history of COVID-19 (04/04/2023), Personal history of diseases of the blood and blood-forming organs and certain disorders involving the immune mechanism (07/02/2013), Personal history of diseases of the skin and subcutaneous tissue (08/11/2015), Personal history of nephrotic syndrome (07/02/2013), Personal history of other diseases of the circulatory system (08/27/2013), Personal history of other diseases of the nervous system and sense organs, Personal history of other diseases of the respiratory system, Personal history of other infectious and parasitic diseases (07/02/2013), Personal history of other  specified conditions, Personal history of other specified conditions (08/27/2013), Posterior epistaxis (03/10/2024), Puckering of macula, right eye (10/24/2022), Raynaud's syndrome without gangrene (07/02/2013), Sinusitis (03/10/2024), Systemic lupus erythematosus, unspecified (Multi) (07/24/2015), Systemic lupus erythematosus, unspecified (Multi) (07/24/2015), Systemic lupus erythematosus, unspecified (Multi) (07/24/2015), 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) (07/02/2013), Type 2 diabetes mellitus with mild nonproliferative diabetic retinopathy without macular edema, left eye (Multi) (07/27/2015), Type 2 diabetes mellitus with mild nonproliferative diabetic retinopathy without macular edema, unspecified eye (Multi) (07/24/2015), Type 2 diabetes mellitus with proliferative diabetic retinopathy without macular edema, right eye (Multi) (07/27/2015), Type 2 diabetes mellitus with proliferative diabetic retinopathy without macular edema, unspecified eye (Multi) (07/24/2015), Unspecified acute and subacute iridocyclitis (07/24/2015), and Unspecified open wound, left foot, sequela (07/03/2018).    Past Surgical History:  She has a past surgical history that includes Eye surgery (03/06/2015); Total hip arthroplasty (01/29/2015); Cholecystectomy (01/29/2015); Other surgical history (01/29/2015); Other surgical history (01/29/2015); Ankle surgery (01/29/2015); Foot surgery (01/29/2015); MR angio head wo IV contrast (7/26/2013); MR angio neck wo IV contrast (7/26/2013); CT guided percutaneous biopsy bone deep (5/4/2021); MR angio head wo IV contrast (9/17/2021); MR angio neck wo IV contrast (9/17/2021); MR angio neck wo IV contrast (3/25/2023); and MR angio head wo IV contrast (3/25/2023).      Social History:  She reports that she has never smoked. She has never been exposed to tobacco smoke. She has never used smokeless tobacco. She reports that she does not currently use alcohol. She reports that she  does not use drugs.    Family History:  Family History   Problem Relation Name Age of Onset    Other (RENAL DISEASE) Mother          END STAGE    Other (CARDIAC DISORDER) Mother      Cataracts Mother      Stroke Mother      Diabetes Mother      Kidney disease Mother      Lupus Mother      Other (CARDIAC DISORDER) Father      COPD Father      Glaucoma Father      Hypertension Father      Sleep apnea Father      Other (CARDIAC DISORDER) Sister      Depression Sister      Kidney disease Sister      Sickle cell trait Sister      Sleep apnea Daughter          Allergies:  Ace inhibitors, Hydroxychloroquine, Lisinopril, Penicillins, and Sulfa (sulfonamide antibiotics)    Inpatient Medications:  Scheduled medications   Medication Dose Route Frequency    acyclovir  10 mg/kg (Ideal) intravenous q8h    [Held by provider] apixaban  2.5 mg oral q12h    [Held by provider] atorvastatin  40 mg oral Daily    atropine        cefTRIAXone  2 g intravenous q24h    [Held by provider] fluticasone-umeclidin-vilanter  1 puff inhalation Daily    [Held by provider] folic acid  1 mg oral Daily    [Held by provider] heparin (porcine)  5,000 Units subcutaneous q8h    hydrocortisone sodium succinate  100 mg intravenous q8h    insulin lispro  0-10 Units subcutaneous q4h    ipratropium-albuteroL  3 mL nebulization TID    [Held by provider] levETIRAcetam  500 mg oral BID    levETIRAcetam  500 mg intravenous q12h    [Held by provider] melatonin  5 mg oral Nightly    [Held by provider] multivitamin with minerals  1 tablet oral Daily    [Held by provider] mycophenolate  1,000 mg oral BID    [Held by provider] OLANZapine  5 mg oral BID    pantoprazole  40 mg intravenous Daily    [Held by provider] predniSONE  15 mg oral Daily    [Held by provider] thiamine  100 mg oral Daily    vancomycin  1,250 mg intravenous q24h     PRN medications   Medication    acetaminophen    atropine    dextrose    dextrose    glucagon    hydrALAZINE    ipratropium-albuteroL     ondansetron    oxygen    vancomycin     Continuous Medications   Medication Dose Last Rate    lactated Ringer's  100 mL/hr 100 mL/hr (05/09/24 6987)     Outpatient Medications:  Current Outpatient Medications   Medication Instructions    acetaminophen (TYLENOL) 650 mg, oral, Every 4 hours PRN    acetaminophen (TYLENOL) 1,000 mg, oral, Every 4 hours PRN    albuterol 90 mcg/actuation inhaler 2 puffs, inhalation, Every 6 hours PRN    apixaban (ELIQUIS) 2.5 mg, oral, 2 times daily    atorvastatin (LIPITOR) 40 mg, oral, Daily    balsam peru-castor oiL (Venelex) ointment Topical, 2 times daily, APPLY TO BUTTOCKS, SACRUM, AND THIGHS.    blood-glucose sensor (Dexcom G6 Sensor) device Use to check sugars 3 times daily    Dexcom G4 platinum  (Dexcom G6 ) misc Use as instructed    Dexcom G4 platinum transmitter (Dexcom G6 Transmitter) device Use as instructed    fluticasone-umeclidin-vilanter (TRELEGY-ELLIPTA) 200-62.5-25 mcg blister with device 1 puff, inhalation, Daily    folic acid (Folvite) 1 mg tablet TAKE 1 TABLET BY MOUTH EVERY DAY    glucagon HCL (GLUCAGON (HCL) EMERGENCY KIT) 1 mg, intramuscular, As needed    guaiFENesin (MUCINEX) 1,200 mg, oral, 2 times daily, Do not crush, chew, or split.    insulin lispro (HumaLOG) 100 unit/mL injection subcutaneous, 3 times daily PRN, Take as directed per insulin Sliding Scale instructions.<BR>0-150 = 0 units<BR>151-200 = 2 units<BR>201-250 = 4 units<BR>251-300 = 6 units<BR>301-350 = 8 units<BR>351-400 = 10 units<BR>>400 = give 10 units and contact MD    Lantus U-100 Insulin 10 Units, subcutaneous, Every morning, Take as directed per insulin instructions.    levETIRAcetam (KEPPRA) 500 mg, oral, Every 12 hours    magnesium oxide (MAG-OX) 800 mg, oral, Daily    meclizine (ANTIVERT) 25 mg, oral, Every 12 hours PRN    melatonin 5 mg tablet 1 tablet, oral, Nightly    multivitamin with minerals tablet 1 tablet, oral, Daily    mycophenolate (CELLCEPT) 1,000 mg, oral, 2  "times daily    nut.tx.gluc intol,lf,soy/fiber (BOOST GLUCOSE CONTROL ORAL) 8 fluid ounces, oral, Every morning    OLANZapine (ZYPREXA) 5 mg, oral, 2 times daily    pantoprazole (ProtoNix) 40 mg EC tablet TAKE 1 TABLET BY MOUTH EVERY DAY    pen needle, diabetic 31 gauge x 5/16\" needle Use to inject 1-4 times daily as directed.    predniSONE (DELTASONE) 15 mg, oral, Daily    thiamine (VITAMIN B-1) 100 mg, oral, Daily       Physical Exam:  Vitals and nursing note reviewed.   Constitutional:       General: She is not in acute distress.     Appearance: She is ill-appearing.   HENT:      Head: Normocephalic and atraumatic.      Mouth/Throat:      Mouth: Mucous membranes are dry.   Eyes:      Pupils: Pupils are equal, round, and reactive to light.   Cardiovascular:      Rate and Rhythm: Bradycardia present.   Pulmonary:      Effort: Pulmonary effort is normal. No respiratory distress.      Breath sounds: Normal breath sounds.   Abdominal:      General: Abdomen is flat. There is no distension.      Tenderness: There is no abdominal tenderness.   Musculoskeletal:      Cervical back: Neck supple.      Right lower leg: No edema.      Left lower leg: No edema.   Skin:     General: Skin is warm and dry.      Capillary Refill: Capillary refill takes less than 2 seconds.   Neurological:      General: No focal deficit present.      Mental Status: She is disoriented.      Assessment/Plan   Clinical impression    1.  Marked sinus bradycardia  2.  Hypertension  3.  Lupus  4.  Chronic kidney disease  5.  Anemia  6.  History of coronary artery disease with coronary artery bypass graft surgery in 2013  7.  History of atrial fibrillation on Eliquis therapy  8.  History of adrenal insufficiency  9.  Cardiomyopathy with a left ventricular ejection fraction n 40% per echocardiogram in March. 24        Peripheral IV 05/07/24 20 G Right Antecubital (Active)   Site Assessment Clean;Dry;Intact 05/09/24 0900   Dressing Status Dry;Clean 05/09/24 " 0900   Number of days: 2       Peripheral IV 05/07/24 20 G Distal;Right;Anterior Forearm (Active)   Site Assessment Clean;Dry;Intact 05/09/24 0900   Dressing Status Dry;Clean 05/09/24 0900   Number of days: 2       Code Status:  Full Code    I spent 60 minutes in the professional and overall care of this patient.        Antonio Rodriges MD

## 2024-05-09 NOTE — SIGNIFICANT EVENT
[facility] SIGNIFICANT EVENT NOTE:    Date:  5/9/2024  Patient:  Bing Holliday  YOB: 1960  MRN:  [id number]   Admit Date:  5/7/2024  =============================================================================================    Patient's mental status remains altered and not at baseline.  While she is waking up little more today and following simple commands she remains encephalopathic.  Noted today the patient is becoming more bradycardic with hypertension.  I discussed these findings with the patient's son.  He notes that she is never had issues with her heart rate or hypertension in the past.  Given the unclear picture of the patient's presentation, and the fact that she is immunocompromise, would be prudent to perform a lumbar puncture to rule out CNS infection/elevated intracranial pressure.  Discussed this with the patient's son López via the telephone.  He understands the risk and benefit of the procedure as the patient has undergone this in the past.  He wishes for me to proceed with an attempted bedside lumbar puncture and provided verbal consent over the telephone.  Will send CSF studies for meningitis, HSV, and check an opening pressure if able.  Low my suspicion for meningitis/encephalitis is low, will escalate the patient's antibiotics to cover HSV encephalitis as well as other typical bacterial pathogens pending CSF results.  If unable to obtain CSF at bedside, will engage IR if she has required this in the past.

## 2024-05-09 NOTE — PROGRESS NOTES
"Bing Holliday is a 63 y.o. female on day 2 of admission presenting with Disorientation.      Subjective   Pt. Remains attached to cvEEG. She is more alert today, opens eyes to voice, squeezes hands, responds to stimulation \"Stop\". Will discontinue EEG if prelim shows no epileptiform changes. MRI w/wo pending.  ROS deferred.   Addendum:   This vEEG is indicative of moderate diffuse encephalopathy. No epileptiform discharges or lateralizing signs are recorded. Multiple non epileptic head clonic events occurred throughout the recording.       Objective     Last Recorded Vitals  Blood pressure 162/80, pulse (!) 40, temperature 36.4 °C (97.5 °F), temperature source Axillary, resp. rate 25, height 1.676 m (5' 6\"), weight 98.3 kg (216 lb 11.4 oz), SpO2 100%.    Physical Exam  Neurological Exam  LIMITED NEURO EXAM    MENTAL STATUS    LOC opens eyes to voice, not following commands, yelling out when stimulated (touching body, looking in eyes)      CRANIAL NERVES    CN II-blinked to threat    CN II/III-PERRLA      CN III/IV/VI/VIII-no spontaneous extraoccular movement, no nystagmus, no dysconjugate gaze, no fixed deviation    CN V/VII-corneal reflex present, no facial asymmetry, grimace response present        SENSORY AND MOTOR    Spontaneous movement present    Withdrawals from a painful stimulus      REFLEXES    Plantar response present    No posturing reflexes      COORDINATION AND GAIT    Untestable in critically ill patient d/t sedation      *Limited Neurological exam was completed d/t patient being critically ill, on sedation, inattentive, or uncooperative, etc.   Relevant Results  Results for orders placed or performed during the hospital encounter of 05/07/24 (from the past 24 hour(s))   Basic metabolic panel   Result Value Ref Range    Glucose 177 (H) 74 - 99 mg/dL    Sodium 145 136 - 145 mmol/L    Potassium 5.0 3.5 - 5.3 mmol/L    Chloride 116 (H) 98 - 107 mmol/L    Bicarbonate 22 21 - 32 mmol/L    Anion Gap 12 " 10 - 20 mmol/L    Urea Nitrogen 27 (H) 6 - 23 mg/dL    Creatinine 1.70 (H) 0.50 - 1.05 mg/dL    eGFR 34 (L) >60 mL/min/1.73m*2    Calcium 7.7 (L) 8.6 - 10.3 mg/dL   Magnesium   Result Value Ref Range    Magnesium 2.16 1.60 - 2.40 mg/dL   SST TOP   Result Value Ref Range    Extra Tube Hold for add-ons.    CBC and Auto Differential   Result Value Ref Range    WBC 4.1 (L) 4.4 - 11.3 x10*3/uL    nRBC 4.4 (H) 0.0 - 0.0 /100 WBCs    RBC 2.30 (L) 4.00 - 5.20 x10*6/uL    Hemoglobin 6.9 (L) 12.0 - 16.0 g/dL    Hematocrit 23.2 (L) 36.0 - 46.0 %     (H) 80 - 100 fL    MCH 30.0 26.0 - 34.0 pg    MCHC 29.7 (L) 32.0 - 36.0 g/dL    RDW 18.7 (H) 11.5 - 14.5 %    Platelets 97 (L) 150 - 450 x10*3/uL    Immature Granulocytes %, Automated 15.6 (H) 0.0 - 0.9 %    Immature Granulocytes Absolute, Automated 0.63 0.00 - 0.70 x10*3/uL   Comprehensive Metabolic Panel   Result Value Ref Range    Glucose 241 (H) 74 - 99 mg/dL    Sodium 145 136 - 145 mmol/L    Potassium 5.1 3.5 - 5.3 mmol/L    Chloride 113 (H) 98 - 107 mmol/L    Bicarbonate 27 21 - 32 mmol/L    Anion Gap 10 10 - 20 mmol/L    Urea Nitrogen 25 (H) 6 - 23 mg/dL    Creatinine 1.70 (H) 0.50 - 1.05 mg/dL    eGFR 34 (L) >60 mL/min/1.73m*2    Calcium 7.9 (L) 8.6 - 10.3 mg/dL    Albumin 2.5 (L) 3.4 - 5.0 g/dL    Alkaline Phosphatase 114 33 - 136 U/L    Total Protein 4.9 (L) 6.4 - 8.2 g/dL    AST 17 9 - 39 U/L    Bilirubin, Total 0.2 0.0 - 1.2 mg/dL    ALT 19 7 - 45 U/L   Magnesium   Result Value Ref Range    Magnesium 2.08 1.60 - 2.40 mg/dL   Manual Differential   Result Value Ref Range    Neutrophils %, Manual 81.0 40.0 - 80.0 %    Lymphocytes %, Manual 10.0 13.0 - 44.0 %    Monocytes %, Manual 2.0 2.0 - 10.0 %    Eosinophils %, Manual 0.0 0.0 - 6.0 %    Basophils %, Manual 0.0 0.0 - 2.0 %    Myelocytes %, Manual 7.0 0.0 - 0.0 %    Seg Neutrophils Absolute, Manual 3.32 1.20 - 7.00 x10*3/uL    Lymphocytes Absolute, Manual 0.41 (L) 1.20 - 4.80 x10*3/uL    Monocytes Absolute, Manual  0.08 (L) 0.10 - 1.00 x10*3/uL    Eosinophils Absolute, Manual 0.00 0.00 - 0.70 x10*3/uL    Basophils Absolute, Manual 0.00 0.00 - 0.10 x10*3/uL    Myelocytes Absolute, Manual 0.29 0.00 - 0.00 x10*3/uL    Total Cells Counted 100     RBC Morphology See Below     Polychromasia Mild     Hypochromia Mild     RBC Fragments Few     Ovalocytes Few     Basophilic Stippling Present    Prepare RBC: 1 Units   Result Value Ref Range    PRODUCT CODE A0560Q67     Unit Number [id number]     Unit ABO O     Unit RH NEG     XM INTEP COMP     Dispense Status TR     Blood Expiration Date May 27, 2024 23:59 EDT     PRODUCT BLOOD TYPE 9500     UNIT VOLUME 350    Type and screen   Result Value Ref Range    ABO TYPE O     Rh TYPE POS     ANTIBODY SCREEN NEG    VERIFY ABO/Rh Group Test   Result Value Ref Range    ABO TYPE O     Rh TYPE POS    ECG 12 Lead   Result Value Ref Range    Ventricular Rate 39 BPM    Atrial Rate 39 BPM    MN Interval 156 ms    QRS Duration 102 ms    QT Interval 502 ms    QTC Calculation(Bazett) 404 ms    P Axis 55 degrees    R Axis -10 degrees    T Axis -9 degrees    QRS Count 7 beats    Q Onset 215 ms    P Onset 137 ms    P Offset 197 ms    T Offset 466 ms    QTC Fredericia 435 ms   EEG    Result Date: 5/9/2024  IMPRESSION Impression This vEEG is indicative of moderate diffuse encephalopathy. No epileptiform discharges or lateralizing signs are recorded. Multiple non epileptic head clonic events occurred throughout the recording. A full report will be scanned into the patient's chart at a later time. This report has been interpreted and electronically signed by    ECG 12 Lead    Result Date: 5/9/2024  Marked sinus bradycardia Voltage criteria for left ventricular hypertrophy Abnormal ECG When compared with ECG of 07-MAY-2024 13:54, (unconfirmed) Vent. rate has decreased BY  26 BPM Nonspecific T wave abnormality no longer evident in Lateral leads QT has shortened Confirmed by Antonio Rodriges ([id number]) on 5/9/2024  1:14:57 PM    Electrocardiogram, 12-lead PRN ACS symptoms    Result Date: 5/9/2024  Normal sinus rhythm Voltage criteria for left ventricular hypertrophy Nonspecific ST and T wave abnormality Abnormal ECG When compared with ECG of 26-APR-2024 12:24, QT has lengthened See ED provider note for full interpretation and clinical correlation Confirmed by Antonio Rodriges ([id number]) on 5/9/2024 1:09:51 PM    CT head wo IV contrast    Result Date: 5/7/2024  Interpreted By:  Higinio Sanchez, STUDY: CT HEAD WO IV CONTRAST;  5/7/2024 5:01 pm   INDICATION: Signs/Symptoms:AMS.   COMPARISON: 04/26/2024   ACCESSION NUMBER(S): [id number]   ORDERING CLINICIAN: GRAHAM HENDERSON   TECHNIQUE: Noncontrast axial CT scan of head was performed. Angled reformats in brain and bone windows were generated. The images were reviewed in bone, brain, blood and soft tissue windows.   FINDINGS: The ventricles, cisterns and sulci are prominent, consistent with mild diffuse volume loss. There are areas of nonspecific white matter hypodensity, which are probably age-related or microvascular in nature.   Gray-white differentiation is intact and there is no evidence of acute cortical infarct. Hypodensity in the left occipital lobe is unchanged and similar the prior exam. No mass, mass effect or midline shift is seen. There is no evidence of hemorrhage.   The visualized paranasal sinuses are clear.         No evidence of acute cortical infarct or intracranial hemorrhage.   Stable chronic changes.   MACRO: None   Signed by: Higinio Sanchez 5/7/2024 5:43 PM Dictation workstation:   [id number]   Scheduled medications   Medication Dose Route Frequency    [Held by provider] apixaban  2.5 mg oral q12h    [Held by provider] atorvastatin  40 mg oral Daily    atropine        cefTRIAXone  1 g intravenous Daily    [Held by provider] fluticasone-umeclidin-vilanter  1 puff inhalation Daily    [Held by provider] folic acid  1 mg oral Daily    heparin (porcine)  5,000 Units  subcutaneous q8h    hydrocortisone sodium succinate  100 mg intravenous q8h    insulin lispro  0-10 Units subcutaneous q4h    ipratropium-albuteroL  3 mL nebulization TID    [Held by provider] levETIRAcetam  500 mg oral BID    levETIRAcetam  500 mg intravenous q12h    [Held by provider] melatonin  5 mg oral Nightly    [Held by provider] multivitamin with minerals  1 tablet oral Daily    [Held by provider] mycophenolate  1,000 mg oral BID    [Held by provider] OLANZapine  5 mg oral BID    pantoprazole  40 mg intravenous Daily    [Held by provider] predniSONE  15 mg oral Daily    [Held by provider] thiamine  100 mg oral Daily     PRN medications   Medication    acetaminophen    atropine    dextrose    dextrose    glucagon    hydrALAZINE    ipratropium-albuteroL    ondansetron    oxygen                       Nasim Coma Scale  Best Eye Response: To verbal stimuli  Best Verbal Response: Inappropriate words  Best Motor Response: Localizes pain  Nasim Coma Scale Score: 11                             Assessment/Plan      Principal Problem:    Disorientation    Impression:  Change in MS, may be metabolic vs. breakthrough seizure vs. UTI  History of CVA (1990), on Eliquis for Afib  History of Lupus cerebritis, adrenal insufficiency  CKD with baseline creatinine of 1.5-1.9  Current UTI    Plan:   Consider CNS coverage and plan for LP with OP   Seizure precautions  MRI brain w/wo ordered and pending      I have discussed the patient and the plan of care with the Neurologist on-call, Dr. Bethea.           I spent 40 minutes in the professional and overall care of this patient.      JIM Webb-CNP    I saw and evaluated the patient. I personally obtained the key and critical portions of the history and physical exam or was physically present for key and critical portions performed by the nurse practitioner. I reviewed the nurse practitioner's documentation and discussed the patient with the nurse practitioner. I agree  with the nurse practitioner's medical decision making as documented in the note.

## 2024-05-10 ENCOUNTER — APPOINTMENT (OUTPATIENT)
Dept: CARDIOLOGY | Facility: HOSPITAL | Age: 64
DRG: 981 | End: 2024-05-10
Payer: MEDICARE

## 2024-05-10 ENCOUNTER — APPOINTMENT (OUTPATIENT)
Dept: RADIOLOGY | Facility: HOSPITAL | Age: 64
DRG: 981 | End: 2024-05-10
Payer: MEDICARE

## 2024-05-10 LAB
ALBUMIN SERPL BCP-MCNC: 2.8 G/DL (ref 3.4–5)
ALP SERPL-CCNC: 115 U/L (ref 33–136)
ALT SERPL W P-5'-P-CCNC: 18 U/L (ref 7–45)
ANION GAP SERPL CALC-SCNC: 14 MMOL/L (ref 10–20)
APPEARANCE CSF: ABNORMAL
AST SERPL W P-5'-P-CCNC: 21 U/L (ref 9–39)
BASOPHILS # BLD MANUAL: 0 X10*3/UL (ref 0–0.1)
BASOPHILS NFR BLD MANUAL: 0 %
BASOPHILS NFR CSF MANUAL: 0 %
BILIRUB SERPL-MCNC: 0.3 MG/DL (ref 0–1.2)
BLASTS CSF MANUAL: 0 %
BUN SERPL-MCNC: 25 MG/DL (ref 6–23)
BURR CELLS BLD QL SMEAR: ABNORMAL
C GATTII+NEOFOR DNA CSF QL NAA+NON-PROBE: NOT DETECTED
C3 SERPL-MCNC: 119 MG/DL (ref 87–200)
C4 SERPL-MCNC: 50 MG/DL (ref 10–50)
CALCIUM SERPL-MCNC: 8.5 MG/DL (ref 8.6–10.3)
CENTROMERE B AB SER-ACNC: <0.2 AI
CHLORIDE SERPL-SCNC: 114 MMOL/L (ref 98–107)
CHROMATIN AB SERPL-ACNC: 2.1 AI
CMV DNA CSF QL NAA+NON-PROBE: NOT DETECTED
CO2 SERPL-SCNC: 23 MMOL/L (ref 21–32)
COLOR CSF: ABNORMAL
COLOR CSF: NORMAL
COLOR SPUN CSF: COLORLESS
CREAT SERPL-MCNC: 1.55 MG/DL (ref 0.5–1.05)
CRP SERPL-MCNC: 1.02 MG/DL
DACRYOCYTES BLD QL SMEAR: ABNORMAL
DSDNA AB SER-ACNC: <1 IU/ML
E COLI K1 DNA CSF QL NAA+NON-PROBE: NOT DETECTED
EGFRCR SERPLBLD CKD-EPI 2021: 38 ML/MIN/1.73M*2
EJECTION FRACTION APICAL 4 CHAMBER: 39.2
ENA JO1 AB SER QL IA: <0.2 AI
ENA RNP AB SER IA-ACNC: 0.4 AI
ENA SCL70 AB SER QL IA: <0.2 AI
ENA SM AB SER IA-ACNC: 0.2 AI
ENA SM+RNP AB SER QL IA: >8 AI
ENA SS-A AB SER IA-ACNC: 1 AI
ENA SS-B AB SER IA-ACNC: <0.2 AI
EOSINOPHIL # BLD MANUAL: 0 X10*3/UL (ref 0–0.7)
EOSINOPHIL NFR BLD MANUAL: 0 %
EOSINOPHIL NFR CSF MANUAL: 0 %
ERYTHROCYTE [DISTWIDTH] IN BLOOD BY AUTOMATED COUNT: 19.1 % (ref 11.5–14.5)
ERYTHROCYTE [SEDIMENTATION RATE] IN BLOOD BY WESTERGREN METHOD: 29 MM/H (ref 0–30)
EV RNA CSF QL NAA+NON-PROBE: NOT DETECTED
GLUCOSE BLD MANUAL STRIP-MCNC: 160 MG/DL (ref 74–99)
GLUCOSE SERPL-MCNC: 165 MG/DL (ref 74–99)
GP B STREP DNA CSF QL NAA+NON-PROBE: NOT DETECTED
HAEM INFLU DNA CSF QL NAA+NON-PROBE: NOT DETECTED
HCT VFR BLD AUTO: 29.9 % (ref 36–46)
HGB BLD-MCNC: 9.2 G/DL (ref 12–16)
HHV6 DNA CSF QL NAA+NON-PROBE: NOT DETECTED
HIV 1+2 AB+HIV1 P24 AG SERPL QL IA: NONREACTIVE
HOLD SPECIMEN: NORMAL
HSV1 DNA CSF QL NAA+NON-PROBE: NOT DETECTED
HSV2 DNA CSF QL NAA+NON-PROBE: NOT DETECTED
IMM GRANULOCYTES # BLD AUTO: 0.83 X10*3/UL (ref 0–0.7)
IMM GRANULOCYTES NFR BLD AUTO: 17.1 % (ref 0–0.9)
IMM GRANULOCYTES NFR CSF: 0 %
L MONOCYTOG DNA CSF QL NAA+NON-PROBE: NOT DETECTED
LEFT VENTRICLE INTERNAL DIMENSION DIASTOLE: 4.81 CM (ref 3.5–6)
LV EJECTION FRACTION BIPLANE: 41 %
LYMPHOCYTES # BLD MANUAL: 0.49 X10*3/UL (ref 1.2–4.8)
LYMPHOCYTES NFR BLD MANUAL: 10 %
LYMPHOCYTES NFR CSF MANUAL: 22 % (ref 28–96)
MAGNESIUM SERPL-MCNC: 1.88 MG/DL (ref 1.6–2.4)
MCH RBC QN AUTO: 30.4 PG (ref 26–34)
MCHC RBC AUTO-ENTMCNC: 30.8 G/DL (ref 32–36)
MCV RBC AUTO: 99 FL (ref 80–100)
METAMYELOCYTES # BLD MANUAL: 0.29 X10*3/UL
METAMYELOCYTES NFR BLD MANUAL: 6 %
MONOCYTES # BLD MANUAL: 0.05 X10*3/UL (ref 0.1–1)
MONOCYTES NFR BLD MANUAL: 1 %
MONOS+MACROS NFR CSF MANUAL: 6 % (ref 16–56)
MYELOCYTES # BLD MANUAL: 0.29 X10*3/UL
MYELOCYTES NFR BLD MANUAL: 6 %
N MEN DNA CSF QL NAA+NON-PROBE: NOT DETECTED
NEUTROPHILS # BLD MANUAL: 3.77 X10*3/UL (ref 1.2–7.7)
NEUTS BAND # BLD MANUAL: 0.05 X10*3/UL (ref 0–0.7)
NEUTS BAND NFR BLD MANUAL: 1 %
NEUTS SEG # BLD MANUAL: 3.72 X10*3/UL (ref 1.2–7)
NEUTS SEG NFR BLD MANUAL: 76 %
NEUTS SEG NFR CSF MANUAL: 72 % (ref 0–5)
NRBC BLD-RTO: 3.1 /100 WBCS (ref 0–0)
OTHER CELLS NFR CSF MANUAL: 0 %
OVALOCYTES BLD QL SMEAR: ABNORMAL
PARECHOVIRUS A RNA CSF QL NAA+NON-PROBE: NOT DETECTED
PATH REVIEW-CELL CT,CSF: NORMAL
PLASMA CELLS NFR CSF MICRO: 0 %
PLATELET # BLD AUTO: 83 X10*3/UL (ref 150–450)
POLYCHROMASIA BLD QL SMEAR: ABNORMAL
POTASSIUM SERPL-SCNC: 5.2 MMOL/L (ref 3.5–5.3)
PROCALCITONIN SERPL-MCNC: 1.09 NG/ML
PROT SERPL-MCNC: 5.4 G/DL (ref 6.4–8.2)
RBC # BLD AUTO: 3.03 X10*6/UL (ref 4–5.2)
RBC # CSF AUTO: ABNORMAL /UL (ref 0–5)
RBC MORPH BLD: ABNORMAL
RIBOSOMAL P AB SER-ACNC: <0.2 AI
RIGHT VENTRICLE PEAK SYSTOLIC PRESSURE: 60.5 MMHG
S PNEUM DNA CSF QL NAA+NON-PROBE: NOT DETECTED
SODIUM SERPL-SCNC: 146 MMOL/L (ref 136–145)
TOTAL CELLS COUNTED BLD: 100
TOTAL CELLS COUNTED CSF: 100
TUBE # CSF: ABNORMAL
VANCOMYCIN SERPL-MCNC: 15.8 UG/ML (ref 5–20)
VZV DNA CSF QL NAA+NON-PROBE: NOT DETECTED
WBC # BLD AUTO: 4.9 X10*3/UL (ref 4.4–11.3)
WBC # CSF AUTO: 38 /UL (ref 1–5)

## 2024-05-10 PROCEDURE — 2500000004 HC RX 250 GENERAL PHARMACY W/ HCPCS (ALT 636 FOR OP/ED): Mod: JZ | Performed by: EMERGENCY MEDICINE

## 2024-05-10 PROCEDURE — 80053 COMPREHEN METABOLIC PANEL: CPT

## 2024-05-10 PROCEDURE — 93010 ELECTROCARDIOGRAM REPORT: CPT | Performed by: INTERNAL MEDICINE

## 2024-05-10 PROCEDURE — 99232 SBSQ HOSP IP/OBS MODERATE 35: CPT | Performed by: STUDENT IN AN ORGANIZED HEALTH CARE EDUCATION/TRAINING PROGRAM

## 2024-05-10 PROCEDURE — 71045 X-RAY EXAM CHEST 1 VIEW: CPT | Performed by: RADIOLOGY

## 2024-05-10 PROCEDURE — 83735 ASSAY OF MAGNESIUM: CPT

## 2024-05-10 PROCEDURE — 86403 PARTICLE AGGLUT ANTBDY SCRN: CPT | Mod: ELYLAB | Performed by: STUDENT IN AN ORGANIZED HEALTH CARE EDUCATION/TRAINING PROGRAM

## 2024-05-10 PROCEDURE — 94640 AIRWAY INHALATION TREATMENT: CPT

## 2024-05-10 PROCEDURE — 88104 CYTOPATH FL NONGYN SMEARS: CPT | Performed by: PATHOLOGY

## 2024-05-10 PROCEDURE — 2500000005 HC RX 250 GENERAL PHARMACY W/O HCPCS: Performed by: EMERGENCY MEDICINE

## 2024-05-10 PROCEDURE — 87070 CULTURE OTHR SPECIMN AEROBIC: CPT | Mod: ELYLAB | Performed by: EMERGENCY MEDICINE

## 2024-05-10 PROCEDURE — 99291 CRITICAL CARE FIRST HOUR: CPT | Performed by: EMERGENCY MEDICINE

## 2024-05-10 PROCEDURE — 89051 BODY FLUID CELL COUNT: CPT | Performed by: STUDENT IN AN ORGANIZED HEALTH CARE EDUCATION/TRAINING PROGRAM

## 2024-05-10 PROCEDURE — 82947 ASSAY GLUCOSE BLOOD QUANT: CPT

## 2024-05-10 PROCEDURE — 62328 DX LMBR SPI PNXR W/FLUOR/CT: CPT

## 2024-05-10 PROCEDURE — 85027 COMPLETE CBC AUTOMATED: CPT

## 2024-05-10 PROCEDURE — 36556 INSERT NON-TUNNEL CV CATH: CPT

## 2024-05-10 PROCEDURE — 93005 ELECTROCARDIOGRAM TRACING: CPT

## 2024-05-10 PROCEDURE — 86140 C-REACTIVE PROTEIN: CPT | Performed by: STUDENT IN AN ORGANIZED HEALTH CARE EDUCATION/TRAINING PROGRAM

## 2024-05-10 PROCEDURE — 85007 BL SMEAR W/DIFF WBC COUNT: CPT

## 2024-05-10 PROCEDURE — 84145 PROCALCITONIN (PCT): CPT | Mod: ELYLAB | Performed by: EMERGENCY MEDICINE

## 2024-05-10 PROCEDURE — 93308 TTE F-UP OR LMTD: CPT | Performed by: INTERNAL MEDICINE

## 2024-05-10 PROCEDURE — 93325 DOPPLER ECHO COLOR FLOW MAPG: CPT | Performed by: INTERNAL MEDICINE

## 2024-05-10 PROCEDURE — 86641 CRYPTOCOCCUS ANTIBODY: CPT | Performed by: STUDENT IN AN ORGANIZED HEALTH CARE EDUCATION/TRAINING PROGRAM

## 2024-05-10 PROCEDURE — 2500000004 HC RX 250 GENERAL PHARMACY W/ HCPCS (ALT 636 FOR OP/ED)

## 2024-05-10 PROCEDURE — 36415 COLL VENOUS BLD VENIPUNCTURE: CPT | Performed by: EMERGENCY MEDICINE

## 2024-05-10 PROCEDURE — 2020000001 HC ICU ROOM DAILY

## 2024-05-10 PROCEDURE — 93308 TTE F-UP OR LMTD: CPT

## 2024-05-10 PROCEDURE — 99233 SBSQ HOSP IP/OBS HIGH 50: CPT | Performed by: INTERNAL MEDICINE

## 2024-05-10 PROCEDURE — 86255 FLUORESCENT ANTIBODY SCREEN: CPT | Performed by: STUDENT IN AN ORGANIZED HEALTH CARE EDUCATION/TRAINING PROGRAM

## 2024-05-10 PROCEDURE — 87483 CNS DNA AMP PROBE TYPE 12-25: CPT | Mod: ELYLAB | Performed by: EMERGENCY MEDICINE

## 2024-05-10 PROCEDURE — 85652 RBC SED RATE AUTOMATED: CPT | Performed by: STUDENT IN AN ORGANIZED HEALTH CARE EDUCATION/TRAINING PROGRAM

## 2024-05-10 PROCEDURE — 71045 X-RAY EXAM CHEST 1 VIEW: CPT

## 2024-05-10 PROCEDURE — 93321 DOPPLER ECHO F-UP/LMTD STD: CPT | Performed by: INTERNAL MEDICINE

## 2024-05-10 PROCEDURE — 2500000002 HC RX 250 W HCPCS SELF ADMINISTERED DRUGS (ALT 637 FOR MEDICARE OP, ALT 636 FOR OP/ED): Performed by: EMERGENCY MEDICINE

## 2024-05-10 PROCEDURE — 62328 DX LMBR SPI PNXR W/FLUOR/CT: CPT | Performed by: RADIOLOGY

## 2024-05-10 PROCEDURE — 2500000004 HC RX 250 GENERAL PHARMACY W/ HCPCS (ALT 636 FOR OP/ED): Performed by: HOSPITALIST

## 2024-05-10 PROCEDURE — 80202 ASSAY OF VANCOMYCIN: CPT | Performed by: EMERGENCY MEDICINE

## 2024-05-10 PROCEDURE — C9113 INJ PANTOPRAZOLE SODIUM, VIA: HCPCS | Performed by: EMERGENCY MEDICINE

## 2024-05-10 PROCEDURE — 36415 COLL VENOUS BLD VENIPUNCTURE: CPT

## 2024-05-10 PROCEDURE — 009U3ZX DRAINAGE OF SPINAL CANAL, PERCUTANEOUS APPROACH, DIAGNOSTIC: ICD-10-PCS | Performed by: RADIOLOGY

## 2024-05-10 PROCEDURE — 83916 OLIGOCLONAL BANDS: CPT | Mod: 91 | Performed by: STUDENT IN AN ORGANIZED HEALTH CARE EDUCATION/TRAINING PROGRAM

## 2024-05-10 RX ORDER — MIDAZOLAM HYDROCHLORIDE 1 MG/ML
5 INJECTION, SOLUTION INTRAMUSCULAR; INTRAVENOUS ONCE
Status: COMPLETED | OUTPATIENT
Start: 2024-05-10 | End: 2024-05-10

## 2024-05-10 RX ORDER — MIDAZOLAM HYDROCHLORIDE 5 MG/ML
5 INJECTION INTRAMUSCULAR; INTRAVENOUS ONCE
Status: DISCONTINUED | OUTPATIENT
Start: 2024-05-10 | End: 2024-05-10

## 2024-05-10 RX ORDER — LIDOCAINE HYDROCHLORIDE 20 MG/ML
INJECTION, SOLUTION EPIDURAL; INFILTRATION; INTRACAUDAL; PERINEURAL AS NEEDED
Status: COMPLETED | OUTPATIENT
Start: 2024-05-10 | End: 2024-05-10

## 2024-05-10 RX ORDER — MAGNESIUM SULFATE HEPTAHYDRATE 40 MG/ML
2 INJECTION, SOLUTION INTRAVENOUS ONCE
Status: COMPLETED | OUTPATIENT
Start: 2024-05-10 | End: 2024-05-10

## 2024-05-10 RX ADMIN — CEFTRIAXONE SODIUM 2 G: 2 INJECTION, POWDER, FOR SOLUTION INTRAMUSCULAR; INTRAVENOUS at 15:04

## 2024-05-10 RX ADMIN — Medication 5 L/MIN: at 21:00

## 2024-05-10 RX ADMIN — ACYCLOVIR SODIUM 590 MG: 50 INJECTION, SOLUTION INTRAVENOUS at 15:03

## 2024-05-10 RX ADMIN — MIDAZOLAM 2 MG: 1 INJECTION INTRAMUSCULAR; INTRAVENOUS at 13:20

## 2024-05-10 RX ADMIN — MIDAZOLAM 5 MG: 1 INJECTION INTRAMUSCULAR; INTRAVENOUS at 18:30

## 2024-05-10 RX ADMIN — VANCOMYCIN HYDROCHLORIDE 1250 MG: 1.25 INJECTION, POWDER, LYOPHILIZED, FOR SOLUTION INTRAVENOUS at 15:03

## 2024-05-10 RX ADMIN — LEVETIRACETAM 500 MG: 5 INJECTION INTRAVENOUS at 08:14

## 2024-05-10 RX ADMIN — PANTOPRAZOLE SODIUM 40 MG: 40 INJECTION, POWDER, FOR SOLUTION INTRAVENOUS at 08:13

## 2024-05-10 RX ADMIN — INSULIN LISPRO 2 UNITS: 100 INJECTION, SOLUTION INTRAVENOUS; SUBCUTANEOUS at 20:48

## 2024-05-10 RX ADMIN — Medication 5 L/MIN: at 18:00

## 2024-05-10 RX ADMIN — LEVETIRACETAM 500 MG: 5 INJECTION INTRAVENOUS at 20:46

## 2024-05-10 RX ADMIN — LIDOCAINE HYDROCHLORIDE 6 ML: 20 INJECTION, SOLUTION EPIDURAL; INFILTRATION; INTRACAUDAL; PERINEURAL at 13:57

## 2024-05-10 RX ADMIN — IPRATROPIUM BROMIDE AND ALBUTEROL SULFATE 3 ML: 2.5; .5 SOLUTION RESPIRATORY (INHALATION) at 19:58

## 2024-05-10 RX ADMIN — HYDROCORTISONE SODIUM SUCCINATE 100 MG: 100 INJECTION, POWDER, FOR SOLUTION INTRAMUSCULAR; INTRAVENOUS at 08:13

## 2024-05-10 RX ADMIN — Medication 5 L/MIN: at 07:32

## 2024-05-10 RX ADMIN — HYDROCORTISONE SODIUM SUCCINATE 100 MG: 100 INJECTION, POWDER, FOR SOLUTION INTRAMUSCULAR; INTRAVENOUS at 01:02

## 2024-05-10 RX ADMIN — ACYCLOVIR SODIUM 590 MG: 50 INJECTION, SOLUTION INTRAVENOUS at 05:15

## 2024-05-10 RX ADMIN — Medication 5 L/MIN: at 23:00

## 2024-05-10 RX ADMIN — INSULIN LISPRO 2 UNITS: 100 INJECTION, SOLUTION INTRAVENOUS; SUBCUTANEOUS at 04:56

## 2024-05-10 RX ADMIN — Medication 5 L/MIN: at 22:00

## 2024-05-10 RX ADMIN — Medication 5 L/MIN: at 20:00

## 2024-05-10 RX ADMIN — MAGNESIUM SULFATE HEPTAHYDRATE 2 G: 40 INJECTION, SOLUTION INTRAVENOUS at 08:13

## 2024-05-10 RX ADMIN — HYDROCORTISONE SODIUM SUCCINATE 100 MG: 100 INJECTION, POWDER, FOR SOLUTION INTRAMUSCULAR; INTRAVENOUS at 17:11

## 2024-05-10 RX ADMIN — IPRATROPIUM BROMIDE AND ALBUTEROL SULFATE 3 ML: 2.5; .5 SOLUTION RESPIRATORY (INHALATION) at 07:32

## 2024-05-10 RX ADMIN — Medication 5 L/MIN: at 10:00

## 2024-05-10 RX ADMIN — ACYCLOVIR SODIUM 590 MG: 50 INJECTION, SOLUTION INTRAVENOUS at 21:56

## 2024-05-10 RX ADMIN — INSULIN LISPRO 2 UNITS: 100 INJECTION, SOLUTION INTRAVENOUS; SUBCUTANEOUS at 08:34

## 2024-05-10 SDOH — ECONOMIC STABILITY: HOUSING INSECURITY: IN THE LAST 12 MONTHS, HOW MANY PLACES HAVE YOU LIVED?: 1

## 2024-05-10 SDOH — SOCIAL STABILITY: SOCIAL INSECURITY: DO YOU FEEL ANYONE HAS EXPLOITED OR TAKEN ADVANTAGE OF YOU FINANCIALLY OR OF YOUR PERSONAL PROPERTY?: UNABLE TO ASSESS

## 2024-05-10 SDOH — SOCIAL STABILITY: SOCIAL INSECURITY: HAVE YOU HAD THOUGHTS OF HARMING ANYONE ELSE?: NO

## 2024-05-10 SDOH — SOCIAL STABILITY: SOCIAL INSECURITY: ARE THERE ANY APPARENT SIGNS OF INJURIES/BEHAVIORS THAT COULD BE RELATED TO ABUSE/NEGLECT?: UNABLE TO ASSESS

## 2024-05-10 SDOH — SOCIAL STABILITY: SOCIAL INSECURITY: HAS ANYONE EVER THREATENED TO HURT YOUR FAMILY OR YOUR PETS?: UNABLE TO ASSESS

## 2024-05-10 SDOH — SOCIAL STABILITY: SOCIAL INSECURITY: DO YOU FEEL UNSAFE GOING BACK TO THE PLACE WHERE YOU ARE LIVING?: UNABLE TO ASSESS

## 2024-05-10 SDOH — SOCIAL STABILITY: SOCIAL INSECURITY: ABUSE: ADULT

## 2024-05-10 SDOH — SOCIAL STABILITY: SOCIAL INSECURITY: WERE YOU ABLE TO COMPLETE ALL THE BEHAVIORAL HEALTH SCREENINGS?: NO

## 2024-05-10 SDOH — SOCIAL STABILITY: SOCIAL INSECURITY: DOES ANYONE TRY TO KEEP YOU FROM HAVING/CONTACTING OTHER FRIENDS OR DOING THINGS OUTSIDE YOUR HOME?: UNABLE TO ASSESS

## 2024-05-10 SDOH — SOCIAL STABILITY: SOCIAL INSECURITY: HAVE YOU HAD ANY THOUGHTS OF HARMING ANYONE ELSE?: UNABLE TO ASSESS

## 2024-05-10 SDOH — SOCIAL STABILITY: SOCIAL INSECURITY: ARE YOU OR HAVE YOU BEEN THREATENED OR ABUSED PHYSICALLY, EMOTIONALLY, OR SEXUALLY BY ANYONE?: UNABLE TO ASSESS

## 2024-05-10 ASSESSMENT — PAIN - FUNCTIONAL ASSESSMENT
PAIN_FUNCTIONAL_ASSESSMENT: PAINAD (PAIN ASSESSMENT IN ADVANCED DEMENTIA SCALE)
PAIN_FUNCTIONAL_ASSESSMENT: PAINAD (PAIN ASSESSMENT IN ADVANCED DEMENTIA SCALE)
PAIN_FUNCTIONAL_ASSESSMENT: CPOT (CRITICAL CARE PAIN OBSERVATION TOOL)
PAIN_FUNCTIONAL_ASSESSMENT: PAINAD (PAIN ASSESSMENT IN ADVANCED DEMENTIA SCALE)
PAIN_FUNCTIONAL_ASSESSMENT: PAINAD (PAIN ASSESSMENT IN ADVANCED DEMENTIA SCALE)

## 2024-05-10 ASSESSMENT — PAIN SCALES - PAIN ASSESSMENT IN ADVANCED DEMENTIA (PAINAD)
BODYLANGUAGE: RELAXED
NEGVOCALIZATION: OCCASIONAL MOAN/GROAN, LOW SPEECH, NEGATIVE/DISAPPROVING QUALITY
BREATHING: NORMAL
CONSOLABILITY: NO NEED TO CONSOLE
BREATHING: NORMAL
FACIALEXPRESSION: SMILING OR INEXPRESSIVE
TOTALSCORE: 0
BODYLANGUAGE: RELAXED
BREATHING: NORMAL
CONSOLABILITY: NO NEED TO CONSOLE
TOTALSCORE: 2
CONSOLABILITY: NO NEED TO CONSOLE
CONSOLABILITY: NO NEED TO CONSOLE
FACIALEXPRESSION: SMILING OR INEXPRESSIVE
BODYLANGUAGE: RELAXED
BODYLANGUAGE: RELAXED
BREATHING: NORMAL
TOTALSCORE: RELAXED
FACIALEXPRESSION: SMILING OR INEXPRESSIVE
BREATHING: NORMAL
TOTALSCORE: 0
TOTALSCORE: 0
CONSOLABILITY: NO NEED TO CONSOLE
BODYLANGUAGE: TENSE, DISTRESSED PACING, FIDGETING
TOTALSCORE: REPOSITIONED;REST
TOTALSCORE: 0

## 2024-05-10 ASSESSMENT — LIFESTYLE VARIABLES
HOW OFTEN DO YOU HAVE A DRINK CONTAINING ALCOHOL: PATIENT UNABLE TO ANSWER
AUDIT-C TOTAL SCORE: -1
AUDIT-C TOTAL SCORE: -1
HOW OFTEN DO YOU HAVE 6 OR MORE DRINKS ON ONE OCCASION: PATIENT UNABLE TO ANSWER
HOW MANY STANDARD DRINKS CONTAINING ALCOHOL DO YOU HAVE ON A TYPICAL DAY: PATIENT UNABLE TO ANSWER
SKIP TO QUESTIONS 9-10: 0

## 2024-05-10 ASSESSMENT — COGNITIVE AND FUNCTIONAL STATUS - GENERAL
TOILETING: TOTAL
MOVING TO AND FROM BED TO CHAIR: TOTAL
CLIMB 3 TO 5 STEPS WITH RAILING: TOTAL
STANDING UP FROM CHAIR USING ARMS: TOTAL
HELP NEEDED FOR BATHING: TOTAL
PERSONAL GROOMING: TOTAL
WALKING IN HOSPITAL ROOM: TOTAL
MOVING FROM LYING ON BACK TO SITTING ON SIDE OF FLAT BED WITH BEDRAILS: TOTAL
DAILY ACTIVITIY SCORE: 6
EATING MEALS: TOTAL
MOBILITY SCORE: 6
DRESSING REGULAR UPPER BODY CLOTHING: TOTAL
TURNING FROM BACK TO SIDE WHILE IN FLAT BAD: TOTAL
DRESSING REGULAR LOWER BODY CLOTHING: TOTAL

## 2024-05-10 ASSESSMENT — PAIN SCALES - GENERAL
PAINLEVEL_OUTOF10: 0 - NO PAIN

## 2024-05-10 NOTE — CONSULTS
Social Work Note  The pt is admitted from [facility] with a confirmed insurance bedhold. The LSW has sent updates to [facility] in [facility]. The pt continues to be cared for in the SICU with acute and recurrent encephalopathy with neurology consult.

## 2024-05-10 NOTE — POST-PROCEDURE NOTE
Fluoroscopic guided lumbar puncture.    Multiple procedure limitations including: Limited patient cooperation with agitation and movement, scoliosis, multilevel degenerative changes of the spine.    Site: L3-4.  Local anesthesia with 2% Lidocaine.  22 ga spinal needle.  Return of blood tinged CSF.   Only approximately 0.5 mL CSF was able to be collected and then flow of fluid stopped. Patient becoming more agitated therefore spinal needle removed and procedure ended.   Band aid placed.     The small volume fluid obtained was placed in single sterile vial and submitted for lab analysis.     Volume of fluid obtained insufficient to measure opening pressure.

## 2024-05-10 NOTE — PROGRESS NOTES
"Vancomycin Dosing by Pharmacy- FOLLOW UP    Bing Holliday is a 63 y.o. year old female who Pharmacy has been consulted for vancomycin dosing for CNS/meningoencephalitis. Based on the patient's indication and renal status this patient is being dosed based on a goal AUC of 500-600.     Renal function is currently improving.    Current vancomycin dose: 1250 mg given every 24 hours    Estimated vancomycin AUC on current dose: 545 mg/L.hr     Visit Vitals  /53   Pulse (!) 34   Temp 35 °C (95 °F)   Resp 11        Lab Results   Component Value Date    CREATININE 1.55 (H) 05/10/2024    CREATININE 1.70 (H) 05/09/2024    CREATININE 1.70 (H) 05/08/2024    CREATININE 1.60 (H) 05/08/2024        Patient weight is No results found for: \"PTWEIGHT\"    No results found for: \"CULTURE\"     I/O last 3 completed shifts:  In: 6526 (63.7 mL/kg) [I.V.:3933 (38.4 mL/kg); Blood:308; IV Piggyback:2285]  Out: 1450 (14.2 mL/kg) [Urine:1450 (0.4 mL/kg/hr)]  Weight: 102.4 kg   [unfilled]    Lab Results   Component Value Date    PATIENTTEMP  05/07/2024      Comment:      NOTE: Patient Results are Not Corrected for Temperature    PATIENTTEMP  05/07/2024      Comment:      NOTE: Patient Results are Not Corrected for Temperature    PATIENTTEMP  04/24/2024      Comment:      NOTE: Patient Results are Not Corrected for Temperature        Assessment/Plan    Within goal AUC range. Continue current vancomycin regimen.    This dosing regimen is predicted by [employer] to result in the following pharmacokinetic parameters:  Regimen: 1250 mg IV every 24 hours.  Start time: 17:26 on 05/11/2024  Exposure target: AUC24 (range)400-600 mg/L.hr   AUC24,ss: 545 mg/L.hr  Probability of AUC24 > 400: 99 %  Ctrough,ss: 14.8 mg/L  Probability of Ctrough,ss > 20: 9 %  Probability of nephrotoxicity (Lodise YUDY 2009): 10 %    The next level will be obtained on 5/12/24 at 0500. May be obtained sooner if clinically indicated.   Will continue to monitor renal " function daily while on vancomycin and order serum creatinine at least every 48 hours if not already ordered.  Follow for continued vancomycin needs, clinical response, and signs/symptoms of toxicity.       Kinza Fish, PharmD

## 2024-05-10 NOTE — NURSING NOTE
Per specials patient needs prone trial prior to coming for LP. Patient proned at 0800, is tolerating well with vital signs, however is very jerky/shakey. Dr. Mckeon placed order for IV versed push pre LP. Special procedures to call patient's son.  0800 insulin based on dexcom reading of 152   No pertinent past medical history

## 2024-05-10 NOTE — NURSING NOTE
We just attempted the lumbar puncture and even with 2 mg IV Versed she moaned and moved the whole time but mostly her upper body but Dr. Tucker was able to remove just a very small amount of light pink fluid, maybe 1ml and then we had to stop.  No opening pressure were able to be obtained.  I sent the small amount of fluid to lab. Dr. Bethea updated and Aliya, ICU RN was at bedside during whole procedure.

## 2024-05-10 NOTE — PROGRESS NOTES
Endocrinology Progress Note  Patient: Bing Holliday  Unit/Bed: 07/07-A  YOB: 1960  MRN: [id number]  Acct: [id number]   Admitting Diagnosis: Disorientation [R41.0]  Acute exacerbation of chronic obstructive pulmonary disease (COPD) (Multi) [J44.1]  Hypothermia, initial encounter [T68.XXXA]  Acute pneumonia [J18.9]  Date:  5/7/2024  Hospital Day: 3  Attending: Hay Mckeon DO       Complaint:  Chief Complaint   Patient presents with    Altered Mental Status          Assessment     Patient Active Problem List   Diagnosis    COVID-19    Lupus (Multi)    Ambulatory dysfunction    Myelodysplastic syndrome, unspecified (Multi)    Lupus vasculitis (Multi)    Hyperlipidemia    Pneumonia of both lower lobes due to infectious organism    Hallucinations    Leg swelling    Hyperglycemia    JED (acute kidney injury) (CMS-[facility])    Hypernatremia    Proliferative diabetic retinopathy of right eye without macular edema determined by examination associated with type 2 diabetes mellitus (Multi)    Urinary incontinence    Paraparesis (Multi)    Abdominal cramping    Abnormal echocardiogram    Abnormal gait    Abnormal thyroid blood test    Advanced COPD (Multi)    Afferent pupillary defect of right eye    Anemia    Pancytopenia (Multi)    Altitudinal scotoma of right eye    Arcuate scotoma of left eye    Arthropathy    Asymptomatic PVCs    PAF (paroxysmal atrial fibrillation) (Multi)    Balance problem    Bilateral high frequency sensorineural hearing loss    Bradycardia    Branch retinal artery occlusion    Cardiomyopathy (Multi)    Chronic diarrhea    Chronic fatigue    Chronic kidney disease (CKD), stage III (moderate) (Multi)    CKD stage G3a/A2, GFR 45-59 and albumin creatinine ratio  mg/g (Multi)    Combined forms of age-related cataract of left eye    Combined forms of age-related cataract of right eye    Contracture of hand    Snoring    CVA (cerebral vascular accident) (Multi)    Daytime  somnolence    Degeneration of lumbar/lumbosacral disc without myelopathy    Depression, major    Dizziness    Dupuytren's contracture    Eczema    Elevated alkaline phosphatase level    Encephalopathy acute    Facial twitching    Fibromyalgia    Fungal nail infection    GERD (gastroesophageal reflux disease)    Gouty arthropathy    H/O iritis    H/O orthostatic hypotension    Hereditary elliptocytosis (CMS-HCC)    High level of cardiac marker    Hip hematoma, right    Hypothermia    Insomnia    Leg edema, left    Loss of consciousness (Multi)    Low blood sugar reading    Lung nodule, multiple    Lupus nephritis (Multi)    Metabolic encephalopathy    Muscle cramps    Nodule of skin of left lower leg    Obstructive lung disease (Multi)    Osteoarthritis of left hand    Osteoarthritis of right hand    Osteopenia    Osteoporosis    Palpitations    Peripheral visual field defect    Persistent proteinuria    Positive colorectal cancer screening using Cologuard test    Benign essential HTN    Psychosis (Multi)    Pulmonary hypertension (Multi)    Refractive error    Hypertensive retinopathy    Retinal neovascularization    Secondary pulmonary arterial hypertension (Multi)    Shortness of breath on exertion    Spinal stenosis of lumbar region with neurogenic claudication    Subjective tinnitus of both ears    Swelling of right foot    Syncope and collapse    Thrombophlebitis of superficial veins of left lower extremity    Tinnitus of left ear    Vitamin D deficiency    DM type 2 with diabetic peripheral neuropathy (Multi)    Systemic lupus erythematosus (Multi)    Diabetic nephropathy (Multi)    Functional diarrhea    UTI (urinary tract infection)    Moderate protein-calorie malnutrition (Multi)    Shock, unspecified (Multi)    Hyperkalemia    Adrenal insufficiency (Multi)    Seizure-like activity (Multi)    Meningitis (HHS-HCC)    Encephalopathy    Seizure (Multi)    Uncontrolled blood glucose    Cervical spondylosis with  "myelopathy    COPD (chronic obstructive pulmonary disease) (Multi)    Encounter for diabetes education    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)    Chronic kidney disease, stage 4 (severe) (Multi)    Ulcerative (chronic) pancolitis with rectal bleeding (Multi)    Anxiety    Cortical cataract    Hyperparathyroidism due to renal insufficiency (Multi)    Iron deficiency anemia due to chronic blood loss    Left ventricular hypertrophy    Nephrosclerosis    Obesity    Ocular migraine    Persistent cough    Proliferative diabetic retinopathy (Multi)    Raynaud's disease    Disorientation          Plan:  Adrenal insufficiency  Hypertension and hypothermia  To continue her high-dose hydrocortisone stress doses IV until her condition is better.        SUBJECTIVE    The patient is in the ICU with hyperthermia and a bear hugger on intensive management unable to talk        VITALS      Vitals:    05/10/24 1500 05/10/24 1600 05/10/24 1700 05/10/24 1800   BP: 149/62 148/61 166/70 148/65   BP Location:    Left arm   Patient Position:    Lying   Pulse: (!) 36 (!) 40 52 (!) 32   Resp: (!) 36 (!) 40 (!) 42 (!) 33   Temp:    35.7 °C (96.3 °F)   TempSrc:    Temporal   SpO2: 98% 96% 96% 98%   Weight:       Height:           Intake/Output Summary (Last 24 hours) at 5/10/2024 1905  Last data filed at 5/10/2024 1618  Gross per 24 hour   Intake 3203 ml   Output 900 ml   Net 2303 ml      Wt Readings from Last 4 Encounters:   05/10/24 102 kg (225 lb 12 oz)   04/24/24 90.9 kg (200 lb 6.4 oz)   03/19/24 96.5 kg (212 lb 11.9 oz)   03/11/24 96.5 kg (212 lb 11.2 oz)        Allergies:  Allergies   Allergen Reactions    Ace Inhibitors Swelling, Angioedema, Shortness of breath and Other     'FACIAL TWISTING\" \"LOOKS LIKE I HAD A STROKE IN MY SLEEP\"    Hydroxychloroquine Other, Nausea And Vomiting, Nausea/vomiting and Unknown     RETINAL BLEEDING    RETINAL BLEEDING      RETINAL BLEEDING, Eye problems    " Lisinopril Swelling    Penicillins Unknown     tolerates cephalosporins-unknown childhood allergy    Sulfa (Sulfonamide Antibiotics) Hives        PHYSICAL EXAM   Physical Exam  Lying in bed under better blankets this morning      LABS   Magnesium:  Results from last 7 days   Lab Units 05/10/24  0450 05/09/24  0359 05/08/24 2048   MAGNESIUM mg/dL 1.88 2.08 2.16     Lipid Panel:       Lab Review  Lab Results   Component Value Date    BILITOT 0.3 05/10/2024    CALCIUM 8.5 (L) 05/10/2024    CO2 23 05/10/2024     (H) 05/10/2024    CREATININE 1.55 (H) 05/10/2024    GLUCOSE 165 (H) 05/10/2024    ALKPHOS 115 05/10/2024    K 5.2 05/10/2024    PROT 5.4 (L) 05/10/2024     (H) 05/10/2024    AST 21 05/10/2024    ALT 18 05/10/2024    BUN 25 (H) 05/10/2024    ANIONGAP 14 05/10/2024    MG 1.88 05/10/2024    PHOS 2.8 04/30/2024    GGT 63 (H) 01/07/2021     04/25/2024    ALBUMIN 2.8 (L) 05/10/2024    LIPASE 30 01/24/2023    GFRF 30 (A) 09/01/2023    GFRMALE CANCELED 04/05/2023     Lab Results   Component Value Date    TRIG 79 03/24/2023    CHOL 160 03/24/2023    HDL 70.9 03/24/2023     Lab Results   Component Value Date    HGBA1C 8.4 (H) 04/24/2024    HGBA1C 8.0 (H) 02/08/2024    HGBA1C 7.1 (H) 01/24/2024     The ASCVD Risk score (Stone DK, et al., 2019) failed to calculate for the following reasons:    The patient has a prior MI or stroke diagnosis   Lab Results   Component Value Date    HGBA1C 8.4 (H) 04/24/2024    HGBA1C 8.0 (H) 02/08/2024    HGBA1C 7.1 (H) 01/24/2024     (H) 05/10/2024    K 5.2 05/10/2024     (H) 05/10/2024    CO2 23 05/10/2024    BUN 25 (H) 05/10/2024    CREATININE 1.55 (H) 05/10/2024    CALCIUM 8.5 (L) 05/10/2024    ALBUMIN 2.8 (L) 05/10/2024    PROT 5.4 (L) 05/10/2024    BILITOT 0.3 05/10/2024    ALKPHOS 115 05/10/2024    ALT 18 05/10/2024    AST 21 05/10/2024    GLUCOSE 165 (H) 05/10/2024    CHOL 160 03/24/2023    TRIG 79 03/24/2023    HDL 70.9 03/24/2023    BHYDRXBUT 0.07  10/23/2023    CPEPTIDE 2.9 04/25/2024    INSAB <0.4 11/21/2023        acyclovir, 10 mg/kg (Ideal), intravenous, q8h  [Held by provider] apixaban, 2.5 mg, oral, q12h  [Held by provider] atorvastatin, 40 mg, oral, Daily  cefTRIAXone, 2 g, intravenous, q24h  [Held by provider] fluticasone-umeclidin-vilanter, 1 puff, inhalation, Daily  [Held by provider] folic acid, 1 mg, oral, Daily  [Held by provider] heparin (porcine), 5,000 Units, subcutaneous, q8h  hydrocortisone sodium succinate, 100 mg, intravenous, q8h  insulin lispro, 0-10 Units, subcutaneous, q4h  ipratropium-albuteroL, 3 mL, nebulization, TID  [Held by provider] levETIRAcetam, 500 mg, oral, BID  levETIRAcetam, 500 mg, intravenous, q12h  [Held by provider] melatonin, 5 mg, oral, Nightly  [Held by provider] multivitamin with minerals, 1 tablet, oral, Daily  [Held by provider] mycophenolate, 1,000 mg, oral, BID  [Held by provider] OLANZapine, 5 mg, oral, BID  pantoprazole, 40 mg, intravenous, Daily  [Held by provider] predniSONE, 15 mg, oral, Daily  [Held by provider] thiamine, 100 mg, oral, Daily  vancomycin, 1,250 mg, intravenous, q24h             Electronically signed by Mela Banuelos MD on 5/10/2024 at 7:05 PM

## 2024-05-10 NOTE — CARE PLAN
Problem: Pain  Goal: My pain/discomfort is manageable  Outcome: Progressing     Problem: Safety  Goal: Patient will be injury free during hospitalization  Outcome: Progressing  Goal: I will remain free of falls  Outcome: Progressing     Problem: Daily Care  Goal: Daily care needs are met  Outcome: Progressing     Problem: Psychosocial Needs  Goal: Demonstrates ability to cope with hospitalization/illness  Outcome: Progressing  Goal: Collaborate with me, my family, and caregiver to identify my specific goals  Outcome: Progressing     Problem: Discharge Barriers  Goal: My discharge needs are met  Outcome: Progressing     Problem: Skin  Goal: Decreased wound size/increased tissue granulation at next dressing change  Outcome: Progressing  Flowsheets (Taken 5/10/2024 1052)  Decreased wound size/increased tissue granulation at next dressing change:   Promote sleep for wound healing   Protective dressings over bony prominences  Goal: Participates in plan/prevention/treatment measures  Outcome: Progressing  Flowsheets (Taken 5/10/2024 1052)  Participates in plan/prevention/treatment measures:   Discuss with provider PT/OT consult   Elevate heels  Goal: Prevent/manage excess moisture  Outcome: Progressing  Flowsheets (Taken 5/10/2024 1052)  Prevent/manage excess moisture:   Cleanse incontinence/protect with barrier cream   Monitor for/manage infection if present  Goal: Prevent/minimize sheer/friction injuries  Outcome: Progressing  Flowsheets (Taken 5/10/2024 1052)  Prevent/minimize sheer/friction injuries:   Complete micro-shifts as needed if patient unable. Adjust patient position to relieve pressure points, not a full turn   Use pull sheet   HOB 30 degrees or less   Turn/reposition every 2 hours/use positioning/transfer devices  Goal: Promote/optimize nutrition  Outcome: Progressing  Flowsheets (Taken 5/10/2024 1052)  Promote/optimize nutrition:   Discuss with provider if NPO > 2 days   Reassess MST if dietician not  consulted   Monitor/record intake including meals  Goal: Promote skin healing  Outcome: Progressing  Flowsheets (Taken 5/10/2024 1052)  Promote skin healing:   Protective dressings over bony prominences   Turn/reposition every 2 hours/use positioning/transfer devices   Assess skin/pad under line(s)/device(s)   Ensure correct size (line/device) and apply per  instructions   Rotate device position/do not position patient on device

## 2024-05-10 NOTE — PROGRESS NOTES
"    Cardiology Progress Note  Patient: Bing Holliday  Unit/Bed: 07/07-A  YOB: 1960  MRN: [id number]  Acct: [id number]   Admitting Diagnosis:   Disorientation [R41.0]  Acute exacerbation of chronic obstructive pulmonary disease (COPD) (Multi) [J44.1]  Hypothermia, initial encounter [T68.XXXA]  Acute pneumonia [J18.9]  Date:  5/7/2024  Hospital Day: 3  Attending: Hay Mkceon DO   Rounding CRISTINA/Cardiologist:  Kiara Frankel, APRN-CNP, Dr. Antonio Rodriges    Primary Cardiologist: Dr. Antonio Rodriges      Complaint:  Chief Complaint   Patient presents with    Altered Mental Status        SUBJECTIVE    Patient has a history of lupus complicated with cerebritis on immunosuppressant and also prednisone.     For the last 3 months patient had multiple admissions for episodes of hypothermia, hypotension and also hyponatremia.  All these episodes were associated with altered mental status.  Patient was readmitted again with the same problems.  On arrival her EKG was consistent with sinus rhythm.  Looking at her records also in hospitalizations in the past also she tried to be bradycardic during admission.  In the last 24 hours her heart rate on telemetry has been showing as low as 32 bpm.  Blood pressure stable above 150 mmHg systolic.    Patient continues to have episodes of bradycardia with heart rate as low as 30 bpm overnight.  Plan for lumbar puncture today per medical team        VITALS   Visit Vitals  /82   Pulse 108   Temp 35.4 °C (95.7 °F) (Temporal) Comment: jacqueline rangel applied   Resp (!) 64        I/O:    Intake/Output Summary (Last 24 hours) at 5/10/2024 1232  Last data filed at 5/10/2024 0515  Gross per 24 hour   Intake 1951 ml   Output 600 ml   Net 1351 ml        Allergies:  Allergies   Allergen Reactions    Ace Inhibitors Swelling, Angioedema, Shortness of breath and Other     'FACIAL TWISTING\" \"LOOKS LIKE I HAD A STROKE IN MY SLEEP\"    Hydroxychloroquine Other, Nausea And Vomiting, " Nausea/vomiting and Unknown     RETINAL BLEEDING    RETINAL BLEEDING      RETINAL BLEEDING, Eye problems    Lisinopril Swelling    Penicillins Unknown     tolerates cephalosporins-unknown childhood allergy    Sulfa (Sulfonamide Antibiotics) Hives        PHYSICAL EXAM   Vitals and nursing note reviewed.   Constitutional:       General: She is not in acute distress.     Appearance: She is ill-appearing.   HENT:      Head: Normocephalic and atraumatic.      Mouth/Throat:      Mouth: Mucous membranes are dry.   Eyes:      Pupils: Pupils are equal, round, and reactive to light.   Cardiovascular:      Rate and Rhythm: Bradycardia present.   Pulmonary:      Effort: Pulmonary effort is normal. No respiratory distress.      Breath sounds: Normal breath sounds.   Abdominal:      General: Abdomen is flat. There is no distension.      Tenderness: There is no abdominal tenderness.   Musculoskeletal:      Cervical back: Neck supple.      Right lower leg: No edema.      Left lower leg: No edema.   Skin:     General: Skin is warm and dry.      Capillary Refill: Capillary refill takes less than 2 seconds.   Neurological:      General: No focal deficit present.      Mental Status: She is disoriented.     LABS     Results for orders placed or performed during the hospital encounter of 05/07/24 (from the past 24 hour(s))   CBC and Auto Differential   Result Value Ref Range    WBC 4.9 4.4 - 11.3 x10*3/uL    nRBC 3.1 (H) 0.0 - 0.0 /100 WBCs    RBC 3.03 (L) 4.00 - 5.20 x10*6/uL    Hemoglobin 9.2 (L) 12.0 - 16.0 g/dL    Hematocrit 29.9 (L) 36.0 - 46.0 %    MCV 99 80 - 100 fL    MCH 30.4 26.0 - 34.0 pg    MCHC 30.8 (L) 32.0 - 36.0 g/dL    RDW 19.1 (H) 11.5 - 14.5 %    Platelets 83 (L) 150 - 450 x10*3/uL    Immature Granulocytes %, Automated 17.1 (H) 0.0 - 0.9 %    Immature Granulocytes Absolute, Automated 0.83 (H) 0.00 - 0.70 x10*3/uL   Comprehensive Metabolic Panel   Result Value Ref Range    Glucose 165 (H) 74 - 99 mg/dL    Sodium 146 (H)  136 - 145 mmol/L    Potassium 5.2 3.5 - 5.3 mmol/L    Chloride 114 (H) 98 - 107 mmol/L    Bicarbonate 23 21 - 32 mmol/L    Anion Gap 14 10 - 20 mmol/L    Urea Nitrogen 25 (H) 6 - 23 mg/dL    Creatinine 1.55 (H) 0.50 - 1.05 mg/dL    eGFR 38 (L) >60 mL/min/1.73m*2    Calcium 8.5 (L) 8.6 - 10.3 mg/dL    Albumin 2.8 (L) 3.4 - 5.0 g/dL    Alkaline Phosphatase 115 33 - 136 U/L    Total Protein 5.4 (L) 6.4 - 8.2 g/dL    AST 21 9 - 39 U/L    Bilirubin, Total 0.3 0.0 - 1.2 mg/dL    ALT 18 7 - 45 U/L   Magnesium   Result Value Ref Range    Magnesium 1.88 1.60 - 2.40 mg/dL   Vancomycin   Result Value Ref Range    Vancomycin 15.8 5.0 - 20.0 ug/mL   Manual Differential   Result Value Ref Range    Neutrophils %, Manual 76.0 40.0 - 80.0 %    Bands %, Manual 1.0 0.0 - 5.0 %    Lymphocytes %, Manual 10.0 13.0 - 44.0 %    Monocytes %, Manual 1.0 2.0 - 10.0 %    Eosinophils %, Manual 0.0 0.0 - 6.0 %    Basophils %, Manual 0.0 0.0 - 2.0 %    Metamyelocytes %, Manual 6.0 0.0 - 0.0 %    Myelocytes %, Manual 6.0 0.0 - 0.0 %    Seg Neutrophils Absolute, Manual 3.72 1.20 - 7.00 x10*3/uL    Bands Absolute, Manual 0.05 0.00 - 0.70 x10*3/uL    Lymphocytes Absolute, Manual 0.49 (L) 1.20 - 4.80 x10*3/uL    Monocytes Absolute, Manual 0.05 (L) 0.10 - 1.00 x10*3/uL    Eosinophils Absolute, Manual 0.00 0.00 - 0.70 x10*3/uL    Basophils Absolute, Manual 0.00 0.00 - 0.10 x10*3/uL    Metamyelocytes Absolute, Manual 0.29 0.00 - 0.00 x10*3/uL    Myelocytes Absolute, Manual 0.29 0.00 - 0.00 x10*3/uL    Total Cells Counted 100     Neutrophils Absolute, Manual 3.77 1.20 - 7.70 x10*3/uL    RBC Morphology See Below     Polychromasia Mild     Ovalocytes Few     Teardrop Cells Few     Katarzyna Cells Few    C-reactive protein   Result Value Ref Range    C-Reactive Protein 1.02 (H) <1.00 mg/dL   Sedimentation rate, automated   Result Value Ref Range    Sedimentation Rate 29 0 - 30 mm/h   Lavender Top   Result Value Ref Range    Extra Tube Hold for add-ons.    ECG 12  Lead   Result Value Ref Range    Ventricular Rate 51 BPM    Atrial Rate 51 BPM    DE Interval 152 ms    QRS Duration 90 ms    QT Interval 472 ms    QTC Calculation(Bazett) 435 ms    P Axis 53 degrees    R Axis -13 degrees    T Axis -28 degrees    QRS Count 8 beats    Q Onset 218 ms    P Onset 142 ms    P Offset 204 ms    T Offset 454 ms    QTC Fredericia 447 ms        Scheduled medications  acyclovir, 10 mg/kg (Ideal), intravenous, q8h  [Held by provider] apixaban, 2.5 mg, oral, q12h  [Held by provider] atorvastatin, 40 mg, oral, Daily  cefTRIAXone, 2 g, intravenous, q24h  [Held by provider] fluticasone-umeclidin-vilanter, 1 puff, inhalation, Daily  [Held by provider] folic acid, 1 mg, oral, Daily  [Held by provider] heparin (porcine), 5,000 Units, subcutaneous, q8h  hydrocortisone sodium succinate, 100 mg, intravenous, q8h  insulin lispro, 0-10 Units, subcutaneous, q4h  ipratropium-albuteroL, 3 mL, nebulization, TID  [Held by provider] levETIRAcetam, 500 mg, oral, BID  levETIRAcetam, 500 mg, intravenous, q12h  [Held by provider] melatonin, 5 mg, oral, Nightly  midazolam, 5 mg, intravenous, Once  [Held by provider] multivitamin with minerals, 1 tablet, oral, Daily  [Held by provider] mycophenolate, 1,000 mg, oral, BID  [Held by provider] OLANZapine, 5 mg, oral, BID  pantoprazole, 40 mg, intravenous, Daily  [Held by provider] predniSONE, 15 mg, oral, Daily  [Held by provider] thiamine, 100 mg, oral, Daily  vancomycin, 1,250 mg, intravenous, q24h      Continuous medications  lactated Ringer's, 100 mL/hr, Last Rate: 100 mL/hr (05/09/24 0553)      PRN medications  PRN medications: acetaminophen, dextrose, dextrose, glucagon, hydrALAZINE, ipratropium-albuteroL, ondansetron, oxygen, vancomycin     ECG 12 Lead    Result Date: 5/10/2024  Sinus bradycardia Moderate voltage criteria for LVH, may be normal variant Borderline ECG When compared with ECG of 09-MAY-2024 09:01, Nonspecific T wave abnormality no longer evident in  Anterior leads    EEG    IMPRESSION Impression This vEEG is indicative of moderate diffuse encephalopathy. No epileptiform discharges or lateralizing signs are recorded. Multiple non epileptic head clonic events occurred throughout the recording. A full report will be scanned into the patient's chart at a later time. This report has been interpreted and electronically signed by    ECG 12 Lead    Result Date: 5/9/2024  Marked sinus bradycardia Voltage criteria for left ventricular hypertrophy Abnormal ECG When compared with ECG of 07-MAY-2024 13:54, (unconfirmed) Vent. rate has decreased BY  26 BPM Nonspecific T wave abnormality no longer evident in Lateral leads QT has shortened Confirmed by Antonio Rodriges ([id number]) on 5/9/2024 1:14:57 PM    Electrocardiogram, 12-lead PRN ACS symptoms    Result Date: 5/9/2024  Normal sinus rhythm Voltage criteria for left ventricular hypertrophy Nonspecific ST and T wave abnormality Abnormal ECG When compared with ECG of 26-APR-2024 12:24, QT has lengthened See ED provider note for full interpretation and clinical correlation Confirmed by Antonio Rodriges ([id number]) on 5/9/2024 1:09:51 PM    CT head wo IV contrast    Result Date: 5/7/2024  Interpreted By:  Higinio Sanchez, STUDY: CT HEAD WO IV CONTRAST;  5/7/2024 5:01 pm   INDICATION: Signs/Symptoms:AMS.   COMPARISON: 04/26/2024   ACCESSION NUMBER(S): [id number]   ORDERING CLINICIAN: GRAHAM HENDERSON   TECHNIQUE: Noncontrast axial CT scan of head was performed. Angled reformats in brain and bone windows were generated. The images were reviewed in bone, brain, blood and soft tissue windows.   FINDINGS: The ventricles, cisterns and sulci are prominent, consistent with mild diffuse volume loss. There are areas of nonspecific white matter hypodensity, which are probably age-related or microvascular in nature.   Gray-white differentiation is intact and there is no evidence of acute cortical infarct. Hypodensity in the left occipital lobe is  unchanged and similar the prior exam. No mass, mass effect or midline shift is seen. There is no evidence of hemorrhage.   The visualized paranasal sinuses are clear.         No evidence of acute cortical infarct or intracranial hemorrhage.   Stable chronic changes.   MACRO: None   Signed by: Higinio Sacnhez 5/7/2024 5:43 PM Dictation workstation:   [id number]    XR chest 1 view    Result Date: 5/7/2024  Interpreted By:  Nic Moseley, STUDY: XR CHEST 1 VIEW  5/7/2024 1:32 pm   INDICATION: Signs/Symptoms:weak   COMPARISON: 04/24/2024   ACCESSION NUMBER(S): [id number]   ORDERING CLINICIAN: GRAHAM HENDERSON   TECHNIQUE: A single AP portable radiograph of the chest was obtained.   FINDINGS: Mild diffuse interstitial infiltrates are seen bilaterally, and may represent edema and/or pneumonia. No pneumothorax is identified. The cardiac silhouette is mildly prominent, similar to prior studies.       Diffuse interstitial infiltrates bilaterally, as above. Clinical correlation and continued follow-up until clearing is recommended.   MACRO: None.   Signed by: Nic Moseley 5/7/2024 2:07 PM Dictation workstation:   [id number]    ECG 12 lead    Result Date: 4/30/2024  Normal sinus rhythm Possible Left atrial enlargement Left ventricular hypertrophy with repolarization abnormality Abnormal ECG When compared with ECG of 20-MAR-2024 00:59, Vent. rate has increased BY  37 BPM Confirmed by Evaristo Moran ([id number]) on 4/30/2024 1:13:25 PM    US right upper quadrant    Result Date: 4/27/2024  Interpreted By:  Graciela Blevins, STUDY: US RIGHT UPPER QUADRANT;  4/27/2024 5:24 pm   INDICATION: Signs/Symptoms:looking for islet tumor.   COMPARISON: Correlation with noncontrast CT abdomen pelvis 04/25/2024   ACCESSION NUMBER(S): [id number]   ORDERING CLINICIAN: TU ZAMARRIPA   TECHNIQUE: Grayscale and color Doppler sonographic imaging of the right upper quadrant.   FINDINGS: LIVER: Normal size. Normal echogenicity, and echotexture. No focal  abnormalities.   BILE DUCTS: No intrahepatic or extrahepatic bile duct dilatation. Extrahepatic bile duct = 5 mm.   GALLBLADDER: Status post cholecystectomy.   PANCREAS: Normal head and body. Limited evaluation of the pancreatic tail due to bowel gas. No pancreatic mass is seen. Pancreatic duct is within normal limits in size.   RIGHT KIDNEY: Normal size, no hydronephrosis.   PERITONEUM: No upper abdominal ascites.       Unremarkable right upper quadrant ultrasound.   No visualized pancreatic mass. Please note that ultrasound is limited in sensitivity for pancreatic masses and further evaluation with nonemergent pancreas protocol MRI may be considered if clinically indicated.   MACRO: None   Signed by: Graciela Blevins 4/27/2024 6:47 PM Dictation workstation:   [id number]    CT head wo IV contrast    Result Date: 4/26/2024  Interpreted By:  Graciela Blevins, STUDY: CT HEAD WO IV CONTRAST;  4/26/2024 5:14 pm   INDICATION: Signs/Symptoms:Altered mental status.   COMPARISON: 03/20/2024   ACCESSION NUMBER(S): [id number]   ORDERING CLINICIAN: TU ZAMARRIPA   TECHNIQUE: Axial noncontrast CT images of the head.   FINDINGS: BRAIN PARENCHYMA: Stable encephalomalacia in the left occipital lobe. Gray-white matter interfaces are otherwisepreserved. Calcifications are in the basal ganglia.   deep and periventricular white matter hypodensities are nonspecific, but favored to represent chronic small vessel ischemic changes.  No mass effect or midline shift.   HEMORRHAGE: No acute intracranial hemorrhage. VENTRICLES and EXTRA-AXIAL SPACES: The ventricles and sulci are within normal limits in size for brain volume. No abnormal extraaxial fluid collection. EXTRACRANIAL SOFT TISSUES: Within normal limits. PARANASAL SINUSES/MASTOIDS:  The visualized paranasal sinuses and mastoid air cells are aerated. CALVARIUM: No depressed skull fracture. No destructive osseous lesion.   OTHER FINDINGS: None.       No acute intracranial abnormality.    Stable chronic findings as above.   MACRO: None   Signed by: Graciela Blevins 4/26/2024 6:56 PM Dictation workstation:   [id number]    XR elbow left 3+ views    Result Date: 4/26/2024  Interpreted By:  Jose A Anderson, STUDY: XR ELBOW LEFT 3+ VIEWS; ;  4/26/2024 3:02 pm   INDICATION: Signs/Symptoms:pain.   COMPARISON: None.   ACCESSION NUMBER(S): [id number]   ORDERING CLINICIAN: VINCENT VARELA   FINDINGS: Small anterior and posterior effusions. No definite fracture visualized. No significant soft tissue swelling. No radiopaque foreign body. Mild degenerative changes.       Small effusions without visualized fracture. Mild degenerative changes of the elbow.     MACRO: None   Signed by: Jose A Anderson 4/26/2024 3:41 PM Dictation workstation:   [id number]    CT elbow left wo IV contrast    Result Date: 4/25/2024  Interpreted By:  Graciela Blevins, STUDY: CT ELBOW LEFT WO IV CONTRAST; ;  4/25/2024 8:31 pm   INDICATION: Signs/Symptoms:Self mutilation of antecubital fossa, joint tender to touch, looking for infection.   COMPARISON: None.   ACCESSION NUMBER(S): [id number]   ORDERING CLINICIAN: TU ZAMARRIPA   TECHNIQUE: Noncontrast CT images of the left elbow with axial, sagittal and coronal reconstructed images.   FINDINGS: No acute fracture or malalignment. 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 No erosions or destructive changes. There is a small elbow joint effusion. Otherwise, soft tissues are unremarkable. No soft tissue gas or radiopaque foreign body.       Nonspecific small elbow joint effusion. If there is clinical concern for septic arthritis, joint aspiration is recommended.   Otherwise, soft tissues are unremarkable.   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     MACRO: None   Signed by: Graciela Blevins 4/25/2024 9:54 PM Dictation workstation:   [id number]    CT abdomen pelvis wo IV contrast    Result Date: 4/25/2024  Interpreted By:  Graciela Blevins, STUDY: CT ABDOMEN PELVIS WO IV CONTRAST;   4/25/2024 8:31 pm   INDICATION: Signs/Symptoms:3 pt drop in hgb, AMS, worsening encephalopathy, hx of secondary adrenal insuficiency and hypoglycemia.   COMPARISON: 03/20/2024   ACCESSION NUMBER(S): [id number]   ORDERING CLINICIAN: TU ZAMARRIPA   TECHNIQUE: Axial noncontrast CT images of the abdomen and pelvis with coronal and sagittal reconstructed images.   FINDINGS: LOWER CHEST: New bilateral lower lobe airspace opacities. Cardiomegaly and coronary artery calcifications noted. BONES: No acute osseous abnormality. Diffuse degenerative disc changes and lumbar facet arthropathy. Right total hip arthroplasty. Severe left hip osteoarthrosis. ABDOMINAL WALL: Within normal limits.   ABDOMEN: Lack of intravenous contrast limits evaluation of vessels and solid organs. LIVER: Within normal limits. BILE DUCTS: No biliary dilatation. GALLBLADDER: Cholecystectomy. PANCREAS: No peripancreatic inflammatory stranding or duct dilatation. SPLEEN: Within normal limits. ADRENALS: Within normal limits.   KIDNEYS, URETERS, URINARY BLADDER: No hydronephrosis. 2 mm nonobstructing right renal calculus. Limited evaluation of the distal ureters due to streak artifact. No hydroureter. Evaluation of the urinary bladder due to streak artifact.   VESSELS: No aortic aneurysm. RETROPERITONEUM: No pathologically enlarged lymph nodes.   PELVIS:   REPRODUCTIVE ORGANS: No abnormality, given limitations of the noncontrast CT.   BOWEL: The stomach is mildly distended. No dilated small bowel. Colonic diverticulosis without acute diverticulitis. Normal appendix. PERITONEUM: No ascites or free air, no fluid collection.       No acute abdominal or pelvic process.   Nonspecific bilateral lower lobe airspace opacities. Please correlate clinically to exclude pneumonia.   MACRO: None   Signed by: Graciela Blevins 4/25/2024 9:51 PM Dictation workstation:   [id number]    Bedside Peripheral IV Imaging    Result Date: 4/25/2024  These images are not reportable  by radiology and will not be interpreted by  Radiologists.    Lower extremity venous duplex bilateral    Result Date: 4/25/2024            [facility] [place] [address] [place] [address]     Tel [phone] Fax [phone]  Vascular Lab Report  [facility] US LOWER EXTREMITY VENOUS DUPLEX BILATERAL Patient Name:      LISBET GUTIERRES      Reading Physician:  [id number] Enzo Ashley MD, RPVI Study Date:        4/25/2024             Ordering Provider:  [id number] HILDA JUAREZ MRN/PID:           [id number]              Fellow: Accession#:        [id number]          Technologist:       Beth Hannah RVT, RDMS Date of Birth/Age: 1960 / 63 years Technologist 2: Gender:            F                     Encounter#:         [id number] Admission Status:  Inpatient             Location Performed: [facility]  Diagnosis/ICD: Other specified soft tissue disorders-M79.89 Indication:    Limb swelling CPT Codes:     67455 Peripheral venous duplex scan for DVT complete  Pertinent History: COPD, Anticoagulation, AAA and LE Edema. Afib.  CONCLUSIONS: Right Lower Venous: No evidence of acute deep vein thrombus visualized in the right lower extremity. Left Lower Venous: No evidence of acute deep vein thrombus visualized in the left lower extremity. Additional Findings: Limited images due to patient's inability to tolerate compressions.  Comparison: Compared with study from 1/15/2024, no significant change.No evidence of DVT.  Imaging & Doppler Findings:  Right                 Compressible Thrombus        Flow Distal External Iliac                None   Spontaneous/Phasic CFV                       Yes        None   Spontaneous/Phasic PFV                       Yes         None FV Proximal               Yes        None   Spontaneous/Phasic FV Mid                    Yes        None FV Distal                 Yes        None Popliteal                 Yes        None   Spontaneous/Phasic Peroneal                  Yes        None PTV                       Yes        None  Left                  Compress Thrombus        Flow Distal External Iliac            None   Spontaneous/Phasic CFV                     Yes      None   Spontaneous/Phasic PFV                     Yes      None FV Proximal             Yes      None   Spontaneous/Phasic FV Mid                  Yes      None FV Distal               Yes      None Popliteal               Yes      None   Spontaneous/Phasic Peroneal                Yes      None PTV                     Yes      None  [id number] Enzo Ashley MD, ROLANDO Electronically signed by [id number] ROLANDO Krishna MD on 4/25/2024 at 1:07:18 PM  ** Final **     XR chest 1 view    Result Date: 4/24/2024  Interpreted By:  Mitesh Khan, STUDY: XR CHEST 1 VIEW;  4/24/2024 5:41 pm   INDICATION: Signs/Symptoms:cough.   COMPARISON: 03/19/2024   ACCESSION NUMBER(S): [id number]   ORDERING CLINICIAN: ENEIDA GRIER   FINDINGS:         CARDIOMEDIASTINAL SILHOUETTE: Cardiomediastinal silhouette is enlarged.   LUNGS: There is interval appearance of a right upper lobe patchy airspace disease concerning for pneumonia. The left lung is clear. There is no effusion   ABDOMEN: No remarkable upper abdominal findings.   BONES: No acute osseous changes.       1.  New right upper lobe airspace disease compatible with pneumonia in the proper clinical setting. Radiographic follow-up to resolution in 3-4 weeks is advised.       MACRO: None   Signed by: Mitesh Khan 4/24/2024 5:50 PM Dictation workstation:   [id number]       Encounter Date: 05/07/24   ECG 12 Lead   Result Value    Ventricular Rate 51    Atrial Rate 51    VT Interval 152    QRS Duration 90    QT Interval 472    QTC Calculation(Bazett)  435    P Axis 53    R Axis -13    T Axis -28    QRS Count 8    Q Onset 218    P Onset 142    P Offset 204    T Offset 454    QTC Fredericia 447    Narrative    Sinus bradycardia  Moderate voltage criteria for LVH, may be normal variant  Borderline ECG  When compared with ECG of 09-MAY-2024 09:01,  Nonspecific T wave abnormality no longer evident in Anterior leads        Tele Monitoring: Sinus bradycardia/sinus rhythm with heart rate 30 to 90 bpm    Assessment     Patient Active Problem List   Diagnosis    COVID-19    Lupus (Multi)    Ambulatory dysfunction    Myelodysplastic syndrome, unspecified (Multi)    Lupus vasculitis (Multi)    Hyperlipidemia    Pneumonia of both lower lobes due to infectious organism    Hallucinations    Leg swelling    Hyperglycemia    JED (acute kidney injury) (CMS-[facility])    Hypernatremia    Proliferative diabetic retinopathy of right eye without macular edema determined by examination associated with type 2 diabetes mellitus (Multi)    Urinary incontinence    Paraparesis (Multi)    Abdominal cramping    Abnormal echocardiogram    Abnormal gait    Abnormal thyroid blood test    Advanced COPD (Multi)    Afferent pupillary defect of right eye    Anemia    Pancytopenia (Multi)    Altitudinal scotoma of right eye    Arcuate scotoma of left eye    Arthropathy    Asymptomatic PVCs    PAF (paroxysmal atrial fibrillation) (Multi)    Balance problem    Bilateral high frequency sensorineural hearing loss    Bradycardia    Branch retinal artery occlusion    Cardiomyopathy (Multi)    Chronic diarrhea    Chronic fatigue    Chronic kidney disease (CKD), stage III (moderate) (Multi)    CKD stage G3a/A2, GFR 45-59 and albumin creatinine ratio  mg/g (Multi)    Combined forms of age-related cataract of left eye    Combined forms of age-related cataract of right eye    Contracture of hand    Snoring    CVA (cerebral vascular accident) (Multi)    Daytime somnolence    Degeneration of lumbar/lumbosacral  disc without myelopathy    Depression, major    Dizziness    Dupuytren's contracture    Eczema    Elevated alkaline phosphatase level    Encephalopathy acute    Facial twitching    Fibromyalgia    Fungal nail infection    GERD (gastroesophageal reflux disease)    Gouty arthropathy    H/O iritis    H/O orthostatic hypotension    Hereditary elliptocytosis (CMS-HCC)    High level of cardiac marker    Hip hematoma, right    Hypothermia    Insomnia    Leg edema, left    Loss of consciousness (Multi)    Low blood sugar reading    Lung nodule, multiple    Lupus nephritis (Multi)    Metabolic encephalopathy    Muscle cramps    Nodule of skin of left lower leg    Obstructive lung disease (Multi)    Osteoarthritis of left hand    Osteoarthritis of right hand    Osteopenia    Osteoporosis    Palpitations    Peripheral visual field defect    Persistent proteinuria    Positive colorectal cancer screening using Cologuard test    Benign essential HTN    Psychosis (Multi)    Pulmonary hypertension (Multi)    Refractive error    Hypertensive retinopathy    Retinal neovascularization    Secondary pulmonary arterial hypertension (Multi)    Shortness of breath on exertion    Spinal stenosis of lumbar region with neurogenic claudication    Subjective tinnitus of both ears    Swelling of right foot    Syncope and collapse    Thrombophlebitis of superficial veins of left lower extremity    Tinnitus of left ear    Vitamin D deficiency    DM type 2 with diabetic peripheral neuropathy (Multi)    Systemic lupus erythematosus (Multi)    Diabetic nephropathy (Multi)    Functional diarrhea    UTI (urinary tract infection)    Moderate protein-calorie malnutrition (Multi)    Shock, unspecified (Multi)    Hyperkalemia    Adrenal insufficiency (Multi)    Seizure-like activity (Multi)    Meningitis ([facility]HS-HCC)    Encephalopathy    Seizure (Multi)    Uncontrolled blood glucose    Cervical spondylosis with myelopathy    COPD (chronic obstructive  pulmonary disease) (Multi)    Encounter for diabetes education    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)    Chronic kidney disease, stage 4 (severe) (Multi)    Ulcerative (chronic) pancolitis with rectal bleeding (Multi)    Anxiety    Cortical cataract    Hyperparathyroidism due to renal insufficiency (Multi)    Iron deficiency anemia due to chronic blood loss    Left ventricular hypertrophy    Nephrosclerosis    Obesity    Ocular migraine    Persistent cough    Proliferative diabetic retinopathy (Multi)    Raynaud's disease    Disorientation   Clinical impression   1.  Marked sinus bradycardia  2.  Hypertension  3.  Lupus  4.  Chronic kidney disease  5.  Anemia  6.  History of coronary artery disease with coronary artery bypass graft surgery in 2013  7.  History of atrial fibrillation on Eliquis therapy  8.  History of adrenal insufficiency  9.  Cardiomyopathy with a left ventricular ejection fraction n 40% per echocardiogram in March. 24       Plan:  Per recommendation of Dr. Rodriges if heart rate drops less than 30 bpm and sustained would start patient on IV dopamine  Most likely bradycardia due to CNS.  Plan for lumbar puncture later today  Currently on meningitis rule out precautions  Hold Eliquis for lumbar puncture  Avoid all AV camille blocking agents  Continue to monitor on telemetry  Supplemental potassium to keep potassium over 4 and supplemental magnesium to keep magnesium over 2  Further recommendations per Dr. Rodriges       Electronically signed by ASIM Zaman on 5/10/2024 at 12:32 PM    Patient was seen, chart reviewed.    Telemetry still showing episodes of bradycardia.  Pending LP today  Continue with current medical therapy.  If significant bradycardia, patient may need dopamine therapy.  When patient is stable, she will benefit of a dual-chamber pacemaker implantation.  Electrolyte replacement.    Antonio Rodriges MD

## 2024-05-10 NOTE — PROCEDURES
CENTRAL LINE PLACEMENT    Indication(s):  -Inadequate IV access  -Administration of vasoactive or caustic agents  -CVP monitoring    Site:  - Left internal jugular vein    Catheter: 8.5 Fr, 4 lumen, 20 cm radiopaque polyurethane    Sedation: 5mg Versed  Patient positioned supine +/- slight Trendelenburg.  Sterility: Chlorhexidine skin prep. Hat and mask on myself and assistant(s). Antiseptic hand foam. Sterile gown, gloves and drape.  Local anesthesia: 1 mL of 1% lidocaine subcutaneously.  Ultrasound-guided insertion:  -YES (with sterile ultrasound probe cover)    Seldinger technique with 18 Ga x 2.5 in introducer needle. Guidewire thread easily through introducer needle.  -Needle position confirmed to be intravenous by fall of blood to gravity within pressure transduction tubing.  -Guidewire position confirmed to be intravenous by visualization on ultrasound in short and long axis views.  Small skin incision adjacent to guidewire with #11 scalpel. 10 Fr tissue dilator over guidewire. Catheter thread easily over guidewire and guidewire removed. All ports aspirated for complete removal of air, then flushed with sterile NS.  Catheter secured with sutures @ 20 cm at skin.  -Biopatch and transparent film dressing.  -Transparent film dressing with CHG.  Sterility maintained throughout procedure. No complications. Patient tolerated well.    Chest x-ray  -pending.    Other details: None.      Conchita Quiroz APRN-CNP CCRN  Pulmonology & Critical Care Medicine  [facility]

## 2024-05-10 NOTE — PROGRESS NOTES
"Bing Holliday is a 63 y.o. female on day 3 of admission presenting with Disorientation.      Subjective   Seems more alert today but still very confused. vEEG did not show seizure       Objective     Last Recorded Vitals  Blood pressure 168/66, pulse 56, temperature 35.4 °C (95.7 °F), temperature source Temporal, resp. rate 23, height 1.676 m (5' 6\"), weight 102 kg (225 lb 12 oz), SpO2 98%.    Physical Exam  Neurological Exam  Laying on her side constantly repeating \"Ouch\" but will not follow commands nor track. Finger flexors are contracted but tone is otherwise normal. Withdraws to pain equally. Normoreflexic    Relevant Results                    Normal Coma Scale  Best Eye Response: To verbal stimuli  Best Verbal Response: Incomprehensible sounds  Best Motor Response: Localizes pain  Normal Coma Scale Score: 10                             Assessment/Plan      Principal Problem:    Disorientation    Impression:  Acute and recurrent encephalopathy - differential includes infectious or autoimmune encephalitis/meningitis, cryptococcus, SLE cerebritis, metabolic encephalopathy    vEEG 5/8-9 - moderate diffuse encephalopathy; no epileptiform activity    Recommend:  LP with opening pressure, including autoimmune CSF labs ordered  Ordered C3, C4, KATHI, ESR/CRP, HIV, CSF and serum cryptococcal testing   MRI brain w/ and w/o contrast             Zuhair Bethea MD  "

## 2024-05-10 NOTE — PROGRESS NOTES
[facility] Critical Care Medicine       Date:  5/10/2024  Patient:  Bing Holliday  YOB: 1960  MRN:  [id number]   Admit Date:  5/7/2024  ========================================================================================================    Chief Complaint   Patient presents with    Altered Mental Status         History of Present Illness:  Bing Holliday is a 63 y.o. 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  History somewhat limited as the patient, while she awakens and is able to answer simple questions, is unable to offer any meaningful history.     Extensive chart review undertaken.  Patient with multiple presentations of the same.  Admitted from 1/17 - 1-28 to [facility] for similar presentation.  Underwent extensive workup there at that time, was treated with stress to steroids, rheumatology consultation who do not believe that she was in acute lupus flare, endocrinology consultation, as well as neurology consultation.  During that admission there was concern for potential seizure-like activity, the patient underwent a video EEG, was found to be in status, and was loaded with Keppra and continued on maintenance dosing.  She ultimately was discharged to skilled nursing facility for ongoing care.  At the time of discharge her noted prednisone dosing was 15 mg daily.     She was then readmitted to the hospital 3/9 through 3/14 with hypothermia, hyponatremia, altered mental status, and hypoglycemia.  She again underwent an extensive workup, was seen by endocrinology, manage on stress dose steroids, no infectious etiology elucidated  despite a very exhaustive search, she improved, and at that time she was discharged on what is documented as 20 mg of prednisone daily.     She then was readmitted to the hospital 3/19 through 3/26 with almost identical presentation of hypothermia, hyponatremia, hypoglycemia.  She was treated with stress dose steroids, underwent an MRI of the brain which was unrevealing, she returned to her baseline, and was discharged back to her skilled nursing facility.  At that time she was discharged home and is reported to be 20 mg of prednisone daily.     She then was readmitted to the hospital 4/24 to 4/30 for exactly the same presentation including hypoglycemia, hallucinations, hypothermia, and altered mental status.  And was treated in a very similar fashion including stress dose steroids.  She underwent an infectious workup, was treated for community-acquired pneumonia, and ultimately improved.  She was discharged with what was reported to be 20 mg of prednisone daily.     She presents today with hyperglycemia.  She received glucagon prior to arrival.  In the emergency department she was found to be leukopenic, hyponatremic, with a hemoglobin of 7.8 which is near her baseline hemoglobin.  She was given 1 g of ceftriaxone, 100 mg of hydrocortisone, and placed on a Allan hugger.  She was referred to the ICU for admission given her multiple medical comorbidities.       I was able to discuss case with the patient's skilled nursing facility, they note that she is getting 15 mg of prednisone daily. The recent discharge summary is from her prior admissions, the patient was to be discharged on 20 mg of prednisone as this is a dose that she has been well on.          Interval ICU Events:     5/7: Patient admitted to the ICU.  Seen and examined in the emergency department.  Somnolent but arouses to voice and follows simple commands.  Is without complaint.  Attempted to reach out to the patient's daughter who did not answer the  "phone.    5/8: No acute overnight events.  Patient remains easily arousable to voice but minimally interactive.  Hypothermia improving.  Hypoglycemia resolved.  Creatinine stable.  Urinalysis consistent with urinary tract infection.  Endocrinology consultation completed, agree with stress dose steroids.  EEG to be performed this morning.  Keppra level therapeutic.  Procalcitonin pending.    5/9: No acute overnight events.  Asymptomatic bradycardia noted on telemetry.  Patient becoming more responsive, agitated at times.  Discussed with son at bedside yesterday, notes that this is normal progression of her recovery when she is been hospitalized before.  No seizure activity reported on EEG.  Neurology consultation completed, recommending EEG, MRI when able.  Hypothermia improved, off Allan hugger.  Remains on stress dose steroids.  Urine culture finalized as negative.  Blood cultures prelim negative.  Urine antigens negative.  Anemic this morning with a hemoglobin of 6.9, 1 unit PRBCs ordered.    5/10: No acute overnight events.  Remains with asymptomatic bradycardia.  Electrophysiology saw the patient yesterday, may in the future require a pacemaker however no acute intervention as she is asymptomatic.  Mental status improving this morning, more alert and responsive to name, says \"good morning\" but does not answer other questions appropriately.  LP attempted at bedside yesterday without success, plan for potential IR guided LP today however given improving mental status with stress dose steroids low suspicion for bacterial meningitis/viral encephalitis remains low but given her immunocompromise state will need to rule out.    Medical History:  Past Medical History:   Diagnosis Date    Abnormal kidney function 04/04/2023    Acute headache 03/10/2024    Acute low back pain 10/30/2023    Acute upper respiratory infection, unspecified 03/04/2020    Acute URI    Acute upper respiratory infection, unspecified 09/30/2015    " URTI (acute upper respiratory infection)    Arthritis     Atypical facial pain 03/10/2024    Body mass index (BMI) 23.0-23.9, adult 10/15/2021    BMI 23.0-23.9, adult    Body mass index (BMI) 33.0-33.9, adult 03/04/2020    BMI 33.0-33.9,adult    Cardiomegaly 08/27/2013    Left ventricular hypertrophy    Chest pain 06/10/2022    Comment on above: Added by Problem List Migration; 2013-3-12; Moved to Suppressed Nov 25 2013 9:16PM; Comment on above: Added by Problem List Migration; 2013-3-12; Moved to Suppressed Nov 25 2013 9:16PM;    Chronic kidney disease, stage 3 unspecified (Multi) 07/02/2013    Chronic kidney disease, stage III (moderate)    Confusional state 03/10/2024    Contact with and (suspected) exposure to covid-19 04/04/2023    Delirium 03/10/2024    Disease of pericardium, unspecified ([facility]) 07/02/2013    Pericardial disease    Encounter for follow-up examination after completed treatment for conditions other than malignant neoplasm 10/06/2022    Hospital discharge follow-up    Generalized contraction of visual field, right eye 01/29/2015    Generalized contraction of visual field of right eye    Hearing loss 03/10/2024    History of cataract 03/10/2024    History of thrombocytopenia 03/10/2024    Comment on above: Added by Problem List Migration; 2013-7-2;    Homonymous bilateral field defects, right side 04/29/2016    Homonymous bilateral field defects of right side    Hypertensive chronic kidney disease with stage 1 through stage 4 chronic kidney disease, or unspecified chronic kidney disease 07/02/2013    Nephrosclerosis    Laceration without foreign body, left foot, initial encounter 07/03/2018    Foot laceration, left, initial encounter    Localized edema 03/10/2024    Mass of skin 03/10/2024    Migraine with aura, not intractable, without status migrainosus 10/24/2022    Ocular migraine    Open wound 03/10/2024    Open wound of left foot 03/10/2024    Other conditions influencing health status  07/02/2013    Chronic Glomerulonephritis In Diseases Classified Elsewhere    Other conditions influencing health status 07/02/2013    Progressive Familial Myoclonic Epilepsy    Other conditions influencing health status 07/02/2013    Protein S Deficiency    Other conditions influencing health status 05/22/2015    Familial Combined Hyperlipidemia    Other conditions influencing health status 10/24/2022    IDDM (insulin dependent diabetes mellitus)    Other conditions influencing health status 03/14/2022    Diabetes mellitus, insulin dependent (IDDM), uncontrolled    Other long term (current) drug therapy 10/24/2022    Long-term use of Plaquenil    Overweight with body mass index (BMI) 25.0-29.9 03/10/2024    Pain of toe 03/10/2024    Personal history of COVID-19 04/04/2023    Personal history of diseases of the blood and blood-forming organs and certain disorders involving the immune mechanism 07/02/2013    History of thrombocytopenia    Personal history of diseases of the skin and subcutaneous tissue 08/11/2015    History of foot ulcer    Personal history of nephrotic syndrome 07/02/2013    History of nephrotic syndrome    Personal history of other diseases of the circulatory system 08/27/2013    History of sinus tachycardia    Personal history of other diseases of the nervous system and sense organs     History of cataract    Personal history of other diseases of the respiratory system     History of bronchitis    Personal history of other infectious and parasitic diseases 07/02/2013    History of hepatitis    Personal history of other specified conditions     History of shortness of breath    Personal history of other specified conditions 08/27/2013    History of edema    Posterior epistaxis 03/10/2024    Puckering of macula, right eye 10/24/2022    ERM OD (epiretinal membrane, right eye)    Raynaud's syndrome without gangrene 07/02/2013    Raynaud's disease    Sinusitis 03/10/2024    Systemic lupus erythematosus,  unspecified (Multi) 07/24/2015    SLE (systemic lupus erythematosus)    Systemic lupus erythematosus, unspecified (Multi) 07/24/2015    SLE (systemic lupus erythematosus)    Systemic lupus erythematosus, unspecified (Multi) 07/24/2015    Systemic lupus    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) 07/02/2013    Type 2 diabetes with nephropathy    Type 2 diabetes mellitus with mild nonproliferative diabetic retinopathy without macular edema, left eye (Multi) 07/27/2015    Non-proliferative diabetic retinopathy, left eye    Type 2 diabetes mellitus with mild nonproliferative diabetic retinopathy without macular edema, unspecified eye (Multi) 07/24/2015    Mild non proliferative diabetic retinopathy    Type 2 diabetes mellitus with proliferative diabetic retinopathy without macular edema, right eye (Multi) 07/27/2015    Proliferative diabetic retinopathy of right eye    Type 2 diabetes mellitus with proliferative diabetic retinopathy without macular edema, unspecified eye (Multi) 07/24/2015    Diabetic proliferative retinopathy    Unspecified acute and subacute iridocyclitis 07/24/2015    Acute iritis, right eye    Unspecified open wound, left foot, sequela 07/03/2018    Wound, open, foot, left, sequela     Past Surgical History:   Procedure Laterality Date    ANKLE SURGERY  01/29/2015    Ankle Surgery    CHOLECYSTECTOMY  01/29/2015    Cholecystectomy    CT GUIDED PERCUTANEOUS BIOPSY BONE DEEP  5/4/2021    CT GUIDED PERCUTANEOUS BIOPSY BONE DEEP 5/4/2021 [facility] CLINICAL LEGACY    EYE SURGERY  03/06/2015    Eye Surgery    FOOT SURGERY  01/29/2015    Foot Surgery    MR HEAD ANGIO WO IV CONTRAST  7/26/2013    MR HEAD ANGIO WO IV CONTRAST 7/26/2013 [facility] CLINICAL LEGACY    MR HEAD ANGIO WO IV CONTRAST  9/17/2021    MR HEAD ANGIO WO IV CONTRAST 9/17/2021 [facility]Y [facility]GAC[facility]    MR HEAD ANGIO WO IV CONTRAST  3/25/2023    MR HEAD ANGIO WO IV CONTRAST [facility]    MR NECK ANGIO WO IV CONTRAST  7/26/2013    MR NECK  ANGIO WO IV CONTRAST 7/26/2013 [facility]    MR NECK ANGIO WO IV CONTRAST  9/17/2021    MR NECK ANGIO WO IV CONTRAST 9/17/2021 [facility] EMERGENCY LEGACY    MR NECK ANGIO WO IV CONTRAST  3/25/2023    MR NECK ANGIO WO IV CONTRAST [facility]    OTHER SURGICAL HISTORY  01/29/2015    Creation Of Pericardial Window    OTHER SURGICAL HISTORY  01/29/2015    Quadricepsplasty    TOTAL HIP ARTHROPLASTY  01/29/2015    Hip Replacement     Medications Prior to Admission   Medication Sig Dispense Refill Last Dose    apixaban (Eliquis) 2.5 mg tablet Take 1 tablet (2.5 mg) by mouth 2 times a day. 60 tablet 1 5/7/2024 at 0900    fluticasone-umeclidin-vilanter (TRELEGY-ELLIPTA) 200-62.5-25 mcg blister with device Inhale 1 puff once daily. 60 each 5 5/7/2024 at 0900    folic acid (Folvite) 1 mg tablet TAKE 1 TABLET BY MOUTH EVERY DAY 90 tablet 1 5/7/2024 at 0900    glucagon HCL (Glucagon, HCl, Emergency Kit) 1 mg recon soln Inject 1 mg into the muscle if needed.   5/7/2024 at 1225    guaiFENesin (Mucinex) 600 mg 12 hr tablet Take 2 tablets (1,200 mg) by mouth 2 times a day. Do not crush, chew, or split.   5/7/2024 at 0900    insulin glargine (Lantus U-100 Insulin) 100 unit/mL injection Inject 10 Units under the skin once daily in the morning. Take as directed per insulin instructions.   5/7/2024 at 0800    insulin lispro (HumaLOG) 100 unit/mL injection Inject under the skin 3 times a day as needed for high blood sugar (before meals). Take as directed per insulin Sliding Scale instructions.  0-150 = 0 units  151-200 = 2 units  201-250 = 4 units  251-300 = 6 units  301-350 = 8 units  351-400 = 10 units  >400 = give 10 units and contact MD   5/7/2024 at 0730- 8 UNITS GIVEN    levETIRAcetam (Keppra) 500 mg tablet Take 1 tablet (500 mg) by mouth every 12 hours. 60 tablet 0 5/7/2024 at 0900    magnesium oxide (Mag-Ox) 400 mg (241.3 mg magnesium) tablet Take 2 tablets (800 mg) by mouth once daily. Do not fill before May 1, 2024.   5/7/2024  at 0900    multivitamin with minerals tablet Take 1 tablet by mouth once daily.   5/7/2024 at 0900    mycophenolate (Cellcept) 500 mg tablet TAKE 2 TABLETS BY MOUTH TWICE A  tablet 0 5/7/2024 at 0900    nut.tx.gluc intol,lf,soy/fiber (BOOST GLUCOSE CONTROL ORAL) Take 8 fluid ounces by mouth once daily in the morning.   5/7/2024 at 0900    OLANZapine (ZyPREXA) 5 mg tablet Take 1 tablet (5 mg) by mouth 2 times a day.   5/7/2024 at 0900    pantoprazole (ProtoNix) 40 mg EC tablet TAKE 1 TABLET BY MOUTH EVERY DAY 90 tablet 1 5/7/2024 at 0800    predniSONE (Deltasone) 5 mg tablet Take 3 tablets (15 mg) by mouth once daily.   5/7/2024 at 0900    thiamine 100 mg tablet Take 1 tablet (100 mg) by mouth once daily.   5/7/2024 at 0900    acetaminophen (Tylenol) 325 mg tablet Take 2 tablets (650 mg) by mouth every 4 hours if needed for mild pain (1 - 3), moderate pain (4 - 6) or fever (temp greater than 38.0 C).   5/2/2024    acetaminophen (Tylenol) 500 mg tablet Take 2 tablets (1,000 mg) by mouth every 4 hours if needed for mild pain (1 - 3) or moderate pain (4 - 6).   Unknown    albuterol 90 mcg/actuation inhaler Inhale 2 puffs every 6 hours if needed for shortness of breath or wheezing.   Unknown    atorvastatin (Lipitor) 40 mg tablet Take 1 tablet (40 mg) by mouth once daily. (Patient taking differently: Take 1 tablet (40 mg) by mouth once daily at bedtime.) 90 tablet 3 5/6/2024 at 2100    balsam peru-castor oiL (Venelex) ointment Apply topically 2 times a day. APPLY TO BUTTOCKS, SACRUM, AND THIGHS.   5/6/2024 at PM    blood-glucose sensor (Dexcom G6 Sensor) device Use to check sugars 3 times daily 4 each 2     Dexcom G4 platinum  (Dexcom G6 ) misc Use as instructed 1 each 0     Dexcom G4 platinum transmitter (Dexcom G6 Transmitter) device Use as instructed 1 each 0     meclizine (Antivert) 25 mg tablet Take 1 tablet (25 mg) by mouth every 12 hours if needed for dizziness.   Unknown    melatonin 5 mg  "tablet Take 1 tablet (5 mg) by mouth once daily at bedtime.   5/6/2024 at 2100    pen needle, diabetic 31 gauge x 5/16\" needle Use to inject 1-4 times daily as directed. 100 each 11      Ace inhibitors, Hydroxychloroquine, Lisinopril, Penicillins, and Sulfa (sulfonamide antibiotics)  Social History     Tobacco Use    Smoking status: Never     Passive exposure: Never    Smokeless tobacco: Never   Vaping Use    Vaping status: Never Used   Substance Use Topics    Alcohol use: Not Currently     Comment: RARE    Drug use: Never     Family History   Problem Relation Name Age of Onset    Other (RENAL DISEASE) Mother          END STAGE    Other (CARDIAC DISORDER) Mother      Cataracts Mother      Stroke Mother      Diabetes Mother      Kidney disease Mother      Lupus Mother      Other (CARDIAC DISORDER) Father      COPD Father      Glaucoma Father      Hypertension Father      Sleep apnea Father      Other (CARDIAC DISORDER) Sister      Depression Sister      Kidney disease Sister      Sickle cell trait Sister      Sleep apnea Daughter         Review of Systems:  14 point review of systems was completed and negative except for those specially mention in my HPI    Physical Exam:    Heart Rate:  [30-68]   Temp:  [35 °C (95 °F)-36.5 °C (97.7 °F)]   Resp:  [9-45]   BP: ()/()   Weight:  [102 kg (225 lb 12 oz)]   SpO2:  [90 %-100 %]     Physical Exam  Vitals and nursing note reviewed.   Constitutional:       Appearance: She is ill-appearing.   HENT:      Head: Normocephalic and atraumatic.      Nose: Nose normal.      Mouth/Throat:      Mouth: Mucous membranes are moist.   Eyes:      Extraocular Movements: Extraocular movements intact.      Pupils: Pupils are equal, round, and reactive to light.   Cardiovascular:      Rate and Rhythm: Bradycardia present.   Pulmonary:      Effort: Pulmonary effort is normal.      Breath sounds: Normal breath sounds.   Abdominal:      General: Abdomen is flat.      Palpations: Abdomen " is soft.      Tenderness: There is no abdominal tenderness.   Musculoskeletal:      Cervical back: Normal range of motion and neck supple.      Right lower leg: No edema.      Left lower leg: No edema.   Skin:     General: Skin is warm and dry.      Capillary Refill: Capillary refill takes less than 2 seconds.   Neurological:      General: No focal deficit present.      Mental Status: She is alert. She is disoriented.         Objective:    I have reviewed all medications, laboratory results, and imaging pertinent for today's encounter    Assessment/Plan:    I am currently managing this critically ill patient for the following problems:    Acute encephalopathy: Multiple presentations of the same, believed to be due to adrenal insufficiency.  Rule out septic encephalopathy  History of lupus cerebritis  History of seizure (believed to be focal seizure in nature based on chart review)  Hypoxia on supplemental O2   History of coronary artery disease  History of atrial fibrillation on Eliquis  Hx of heart failure with reduced EF not in acute exacerbation   History of GERD  Recurrent hypoglycemic episodes believed to be due to adrenal insufficiency - possibly due to med rec error.   History of type 2 diabetes now with mild hyperglycemia   History of adrenal insufficiency  Chronic anemia  History of lupus  Immune suppressed     Neuro/Psych/Pain Ctrl/Sedation:  Tylenol available for pain control  Hold Zyprexa   Continue Keppra given her history of seizure, level therapeutic   EEG neg for seizure   Neuro recs reviewed: MRI  LP Today      Respiratory/ENT:  Maintain SpO2 greater than 90%, currently on 5 L nasal cannula  DuoNebs Q6  Home inhalers       Cardiovascular:  Maintain MAP greater than 65, not currently on any vasoactive agents  Hold Statin  Has been unable to tolerate GDMT in the past due to allergies and other comorbidities  ASA given hx of CAD   Hold Eliquis (NPO) - will defer AC currently given decline in HGB, need  for LP  Bradycardia asymptomatic - observe for now - check ECHO     GI:  NPO pending improvement in mental status   Home PPI     Renal/Volume Status (Intra & Extravascular):  Lytes/UOP Acceptable - no need for RRT  LR @ 100    Endocrine  Stress dose steroids 100mg Q8  ENDO following for steroid dosing   Should be on at least 20mg Prednisone NOT 15mg when acute process resolved      Infectious Disease:  Empiric Meningitis Coverage pending LP - Ceftriaxone, Vanco, Acyclovir   Repeat Procal today   Urine cx neg  Blood cultures NGTD     Heme/Onc:  Transfuse for symptomatic anemia, no current indication   Hold [facility]      Ethics/Code Status:  Full Code     :  DVT Prophylaxis: SCDs, Heparin OK post LP  GI Prophylaxis: PPI  Diet: NPO  CVC: NA  [place]: NA  [place]: NA  Restraints: NA  Dispo: Floor     I have personally spent 35 minutes of critical care time, exclusive of time spent on any procedures, in evaluation and management of this critically ill patient        Hay Mckeon, DO

## 2024-05-11 ENCOUNTER — APPOINTMENT (OUTPATIENT)
Dept: CARDIOLOGY | Facility: HOSPITAL | Age: 64
DRG: 981 | End: 2024-05-11
Payer: MEDICARE

## 2024-05-11 ENCOUNTER — APPOINTMENT (OUTPATIENT)
Dept: RADIOLOGY | Facility: HOSPITAL | Age: 64
DRG: 981 | End: 2024-05-11
Payer: MEDICARE

## 2024-05-11 LAB
ALBUMIN SERPL BCP-MCNC: 2.5 G/DL (ref 3.4–5)
ALP SERPL-CCNC: 112 U/L (ref 33–136)
ALT SERPL W P-5'-P-CCNC: 18 U/L (ref 7–45)
ANION GAP SERPL CALC-SCNC: 10 MMOL/L (ref 10–20)
AST SERPL W P-5'-P-CCNC: 17 U/L (ref 9–39)
ATRIAL RATE: 51 BPM
ATRIAL RATE: 73 BPM
BACTERIA BLD CULT: NORMAL
BACTERIA BLD CULT: NORMAL
BASOPHILS # BLD MANUAL: 0 X10*3/UL (ref 0–0.1)
BASOPHILS NFR BLD MANUAL: 0 %
BILIRUB SERPL-MCNC: 0.2 MG/DL (ref 0–1.2)
BUN SERPL-MCNC: 23 MG/DL (ref 6–23)
BURR CELLS BLD QL SMEAR: ABNORMAL
CALCIUM SERPL-MCNC: 8.1 MG/DL (ref 8.6–10.3)
CHLORIDE SERPL-SCNC: 111 MMOL/L (ref 98–107)
CO2 SERPL-SCNC: 29 MMOL/L (ref 21–32)
CREAT SERPL-MCNC: 1.53 MG/DL (ref 0.5–1.05)
CRYPTOC AG SPEC QL LA: NEGATIVE
DOHLE BOD BLD QL SMEAR: PRESENT
EGFRCR SERPLBLD CKD-EPI 2021: 38 ML/MIN/1.73M*2
EOSINOPHIL # BLD MANUAL: 0 X10*3/UL (ref 0–0.7)
EOSINOPHIL NFR BLD MANUAL: 0 %
ERYTHROCYTE [DISTWIDTH] IN BLOOD BY AUTOMATED COUNT: 18.5 % (ref 11.5–14.5)
GIANT PLATELETS BLD QL SMEAR: ABNORMAL
GLUCOSE BLD MANUAL STRIP-MCNC: 171 MG/DL (ref 74–99)
GLUCOSE BLD MANUAL STRIP-MCNC: 176 MG/DL (ref 74–99)
GLUCOSE SERPL-MCNC: 129 MG/DL (ref 74–99)
HCT VFR BLD AUTO: 24.5 % (ref 36–46)
HGB BLD-MCNC: 7.6 G/DL (ref 12–16)
IMM GRANULOCYTES # BLD AUTO: 0.88 X10*3/UL (ref 0–0.7)
IMM GRANULOCYTES NFR BLD AUTO: 17.7 % (ref 0–0.9)
LYMPHOCYTES # BLD MANUAL: 0.3 X10*3/UL (ref 1.2–4.8)
LYMPHOCYTES NFR BLD MANUAL: 6 %
MAGNESIUM SERPL-MCNC: 1.91 MG/DL (ref 1.6–2.4)
MCH RBC QN AUTO: 29.7 PG (ref 26–34)
MCHC RBC AUTO-ENTMCNC: 31 G/DL (ref 32–36)
MCV RBC AUTO: 96 FL (ref 80–100)
METAMYELOCYTES # BLD MANUAL: 0.1 X10*3/UL
METAMYELOCYTES NFR BLD MANUAL: 2 %
MONOCYTES # BLD MANUAL: 0.4 X10*3/UL (ref 0.1–1)
MONOCYTES NFR BLD MANUAL: 8 %
MYELOCYTES # BLD MANUAL: 0.05 X10*3/UL
MYELOCYTES NFR BLD MANUAL: 1 %
NEUTROPHILS # BLD MANUAL: 4.1 X10*3/UL (ref 1.2–7.7)
NEUTS BAND # BLD MANUAL: 0.4 X10*3/UL (ref 0–0.7)
NEUTS BAND NFR BLD MANUAL: 8 %
NEUTS SEG # BLD MANUAL: 3.7 X10*3/UL (ref 1.2–7)
NEUTS SEG NFR BLD MANUAL: 74 %
NRBC BLD-RTO: 1.8 /100 WBCS (ref 0–0)
OVALOCYTES BLD QL SMEAR: ABNORMAL
P AXIS: 48 DEGREES
P AXIS: 53 DEGREES
P OFFSET: 202 MS
P OFFSET: 204 MS
P ONSET: 140 MS
P ONSET: 142 MS
PHOSPHATE SERPL-MCNC: 3.3 MG/DL (ref 2.5–4.9)
PLATELET # BLD AUTO: 102 X10*3/UL (ref 150–450)
PLATELET CLUMP BLD QL SMEAR: PRESENT
POLYCHROMASIA BLD QL SMEAR: ABNORMAL
POTASSIUM SERPL-SCNC: 4.4 MMOL/L (ref 3.5–5.3)
PR INTERVAL: 152 MS
PR INTERVAL: 154 MS
PROT SERPL-MCNC: 4.9 G/DL (ref 6.4–8.2)
Q ONSET: 217 MS
Q ONSET: 218 MS
QRS COUNT: 12 BEATS
QRS COUNT: 8 BEATS
QRS DURATION: 88 MS
QRS DURATION: 90 MS
QT INTERVAL: 396 MS
QT INTERVAL: 472 MS
QTC CALCULATION(BAZETT): 435 MS
QTC CALCULATION(BAZETT): 436 MS
QTC FREDERICIA: 423 MS
QTC FREDERICIA: 447 MS
R AXIS: -13 DEGREES
R AXIS: -4 DEGREES
RBC # BLD AUTO: 2.56 X10*6/UL (ref 4–5.2)
RBC MORPH BLD: ABNORMAL
SCHISTOCYTES BLD QL SMEAR: ABNORMAL
SODIUM SERPL-SCNC: 146 MMOL/L (ref 136–145)
T AXIS: -11 DEGREES
T AXIS: -28 DEGREES
T OFFSET: 415 MS
T OFFSET: 454 MS
TARGETS BLD QL SMEAR: ABNORMAL
TOTAL CELLS COUNTED BLD: 100
TOXIC GRANULES BLD QL SMEAR: PRESENT
VARIANT LYMPHS # BLD MANUAL: 0.05 X10*3/UL (ref 0–0.5)
VARIANT LYMPHS NFR BLD: 1 %
VENTRICULAR RATE: 51 BPM
VENTRICULAR RATE: 73 BPM
WBC # BLD AUTO: 5 X10*3/UL (ref 4.4–11.3)

## 2024-05-11 PROCEDURE — 37799 UNLISTED PX VASCULAR SURGERY: CPT

## 2024-05-11 PROCEDURE — 99232 SBSQ HOSP IP/OBS MODERATE 35: CPT

## 2024-05-11 PROCEDURE — A9575 INJ GADOTERATE MEGLUMI 0.1ML: HCPCS | Performed by: EMERGENCY MEDICINE

## 2024-05-11 PROCEDURE — 94640 AIRWAY INHALATION TREATMENT: CPT

## 2024-05-11 PROCEDURE — 93005 ELECTROCARDIOGRAM TRACING: CPT

## 2024-05-11 PROCEDURE — C9113 INJ PANTOPRAZOLE SODIUM, VIA: HCPCS | Performed by: EMERGENCY MEDICINE

## 2024-05-11 PROCEDURE — 82947 ASSAY GLUCOSE BLOOD QUANT: CPT

## 2024-05-11 PROCEDURE — 80053 COMPREHEN METABOLIC PANEL: CPT

## 2024-05-11 PROCEDURE — 70553 MRI BRAIN STEM W/O & W/DYE: CPT

## 2024-05-11 PROCEDURE — 85007 BL SMEAR W/DIFF WBC COUNT: CPT

## 2024-05-11 PROCEDURE — 83735 ASSAY OF MAGNESIUM: CPT

## 2024-05-11 PROCEDURE — 99233 SBSQ HOSP IP/OBS HIGH 50: CPT | Performed by: INTERNAL MEDICINE

## 2024-05-11 PROCEDURE — 2020000001 HC ICU ROOM DAILY

## 2024-05-11 PROCEDURE — 2500000005 HC RX 250 GENERAL PHARMACY W/O HCPCS: Performed by: EMERGENCY MEDICINE

## 2024-05-11 PROCEDURE — 84100 ASSAY OF PHOSPHORUS: CPT | Performed by: HOSPITALIST

## 2024-05-11 PROCEDURE — 2500000002 HC RX 250 W HCPCS SELF ADMINISTERED DRUGS (ALT 637 FOR MEDICARE OP, ALT 636 FOR OP/ED): Mod: MUE | Performed by: EMERGENCY MEDICINE

## 2024-05-11 PROCEDURE — 99291 CRITICAL CARE FIRST HOUR: CPT | Performed by: EMERGENCY MEDICINE

## 2024-05-11 PROCEDURE — 85027 COMPLETE CBC AUTOMATED: CPT

## 2024-05-11 PROCEDURE — 93010 ELECTROCARDIOGRAM REPORT: CPT | Performed by: INTERNAL MEDICINE

## 2024-05-11 PROCEDURE — 2500000004 HC RX 250 GENERAL PHARMACY W/ HCPCS (ALT 636 FOR OP/ED): Performed by: HOSPITALIST

## 2024-05-11 PROCEDURE — 2500000004 HC RX 250 GENERAL PHARMACY W/ HCPCS (ALT 636 FOR OP/ED): Performed by: EMERGENCY MEDICINE

## 2024-05-11 PROCEDURE — 2550000001 HC RX 255 CONTRASTS: Performed by: EMERGENCY MEDICINE

## 2024-05-11 PROCEDURE — 70553 MRI BRAIN STEM W/O & W/DYE: CPT | Performed by: RADIOLOGY

## 2024-05-11 RX ORDER — GADOTERATE MEGLUMINE 376.9 MG/ML
0.2 INJECTION INTRAVENOUS
Status: COMPLETED | OUTPATIENT
Start: 2024-05-11 | End: 2024-05-11

## 2024-05-11 RX ORDER — MAGNESIUM SULFATE HEPTAHYDRATE 40 MG/ML
2 INJECTION, SOLUTION INTRAVENOUS ONCE
Status: COMPLETED | OUTPATIENT
Start: 2024-05-11 | End: 2024-05-11

## 2024-05-11 RX ADMIN — HYDROCORTISONE SODIUM SUCCINATE 100 MG: 100 INJECTION, POWDER, FOR SOLUTION INTRAMUSCULAR; INTRAVENOUS at 08:20

## 2024-05-11 RX ADMIN — Medication 1 L/MIN: at 22:07

## 2024-05-11 RX ADMIN — Medication 1 L/MIN: at 22:00

## 2024-05-11 RX ADMIN — SODIUM CHLORIDE, POTASSIUM CHLORIDE, SODIUM LACTATE AND CALCIUM CHLORIDE 100 ML/HR: 600; 310; 30; 20 INJECTION, SOLUTION INTRAVENOUS at 08:20

## 2024-05-11 RX ADMIN — HEPARIN SODIUM 5000 UNITS: 5000 INJECTION INTRAVENOUS; SUBCUTANEOUS at 18:36

## 2024-05-11 RX ADMIN — GADOTERATE MEGLUMINE 20 ML: 376.9 INJECTION INTRAVENOUS at 17:52

## 2024-05-11 RX ADMIN — HYDROCORTISONE SODIUM SUCCINATE 100 MG: 100 INJECTION, POWDER, FOR SOLUTION INTRAMUSCULAR; INTRAVENOUS at 15:29

## 2024-05-11 RX ADMIN — CEFTRIAXONE SODIUM 2 G: 2 INJECTION, POWDER, FOR SOLUTION INTRAMUSCULAR; INTRAVENOUS at 14:36

## 2024-05-11 RX ADMIN — Medication 3 L/MIN: at 05:00

## 2024-05-11 RX ADMIN — ACYCLOVIR SODIUM 590 MG: 50 INJECTION, SOLUTION INTRAVENOUS at 05:21

## 2024-05-11 RX ADMIN — Medication 2 L/MIN: at 20:00

## 2024-05-11 RX ADMIN — Medication 3 L/MIN: at 07:00

## 2024-05-11 RX ADMIN — INSULIN LISPRO 2 UNITS: 100 INJECTION, SOLUTION INTRAVENOUS; SUBCUTANEOUS at 18:42

## 2024-05-11 RX ADMIN — Medication 2 L/MIN: at 19:00

## 2024-05-11 RX ADMIN — Medication 1 L/MIN: at 21:54

## 2024-05-11 RX ADMIN — Medication 1 L/MIN: at 21:15

## 2024-05-11 RX ADMIN — IPRATROPIUM BROMIDE AND ALBUTEROL SULFATE 3 ML: 2.5; .5 SOLUTION RESPIRATORY (INHALATION) at 07:37

## 2024-05-11 RX ADMIN — SODIUM CHLORIDE, POTASSIUM CHLORIDE, SODIUM LACTATE AND CALCIUM CHLORIDE 100 ML/HR: 600; 310; 30; 20 INJECTION, SOLUTION INTRAVENOUS at 00:36

## 2024-05-11 RX ADMIN — SODIUM CHLORIDE, POTASSIUM CHLORIDE, SODIUM LACTATE AND CALCIUM CHLORIDE 100 ML/HR: 600; 310; 30; 20 INJECTION, SOLUTION INTRAVENOUS at 21:58

## 2024-05-11 RX ADMIN — HYDROCORTISONE SODIUM SUCCINATE 100 MG: 100 INJECTION, POWDER, FOR SOLUTION INTRAMUSCULAR; INTRAVENOUS at 00:35

## 2024-05-11 RX ADMIN — LEVETIRACETAM 500 MG: 5 INJECTION INTRAVENOUS at 08:20

## 2024-05-11 RX ADMIN — SODIUM CHLORIDE, POTASSIUM CHLORIDE, SODIUM LACTATE AND CALCIUM CHLORIDE 1000 ML: 600; 310; 30; 20 INJECTION, SOLUTION INTRAVENOUS at 09:27

## 2024-05-11 RX ADMIN — ACYCLOVIR SODIUM 590 MG: 50 INJECTION, SOLUTION INTRAVENOUS at 21:57

## 2024-05-11 RX ADMIN — VANCOMYCIN HYDROCHLORIDE 1250 MG: 1.25 INJECTION, POWDER, LYOPHILIZED, FOR SOLUTION INTRAVENOUS at 15:17

## 2024-05-11 RX ADMIN — Medication 3 L/MIN: at 04:00

## 2024-05-11 RX ADMIN — LEVETIRACETAM 500 MG: 5 INJECTION INTRAVENOUS at 21:04

## 2024-05-11 RX ADMIN — ACYCLOVIR SODIUM 590 MG: 50 INJECTION, SOLUTION INTRAVENOUS at 14:36

## 2024-05-11 RX ADMIN — Medication 3 L/MIN: at 03:00

## 2024-05-11 RX ADMIN — Medication 2 L/MIN: at 16:00

## 2024-05-11 RX ADMIN — Medication 1 L/MIN: at 22:10

## 2024-05-11 RX ADMIN — MAGNESIUM SULFATE HEPTAHYDRATE 2 G: 40 INJECTION, SOLUTION INTRAVENOUS at 06:45

## 2024-05-11 RX ADMIN — IPRATROPIUM BROMIDE AND ALBUTEROL SULFATE 3 ML: 2.5; .5 SOLUTION RESPIRATORY (INHALATION) at 13:14

## 2024-05-11 RX ADMIN — PANTOPRAZOLE SODIUM 40 MG: 40 INJECTION, POWDER, FOR SOLUTION INTRAVENOUS at 08:20

## 2024-05-11 RX ADMIN — Medication 3 L/MIN: at 08:00

## 2024-05-11 RX ADMIN — IPRATROPIUM BROMIDE AND ALBUTEROL SULFATE 3 ML: 2.5; .5 SOLUTION RESPIRATORY (INHALATION) at 19:54

## 2024-05-11 RX ADMIN — INSULIN LISPRO 2 UNITS: 100 INJECTION, SOLUTION INTRAVENOUS; SUBCUTANEOUS at 21:57

## 2024-05-11 RX ADMIN — Medication 3 L/MIN: at 06:00

## 2024-05-11 ASSESSMENT — PAIN - FUNCTIONAL ASSESSMENT
PAIN_FUNCTIONAL_ASSESSMENT: CPOT (CRITICAL CARE PAIN OBSERVATION TOOL)

## 2024-05-11 ASSESSMENT — COGNITIVE AND FUNCTIONAL STATUS - GENERAL
MOBILITY SCORE: 7
MOVING TO AND FROM BED TO CHAIR: TOTAL
WALKING IN HOSPITAL ROOM: TOTAL
CLIMB 3 TO 5 STEPS WITH RAILING: TOTAL
MOVING FROM LYING ON BACK TO SITTING ON SIDE OF FLAT BED WITH BEDRAILS: A LOT
TURNING FROM BACK TO SIDE WHILE IN FLAT BAD: TOTAL
STANDING UP FROM CHAIR USING ARMS: TOTAL

## 2024-05-11 ASSESSMENT — PAIN SCALES - PAIN ASSESSMENT IN ADVANCED DEMENTIA (PAINAD)
NEGVOCALIZATION: OCCASIONAL MOAN/GROAN, LOW SPEECH, NEGATIVE/DISAPPROVING QUALITY
BODYLANGUAGE: RELAXED
BREATHING: NORMAL
FACIALEXPRESSION: SMILING OR INEXPRESSIVE
CONSOLABILITY: NO NEED TO CONSOLE
TOTALSCORE: 1

## 2024-05-11 NOTE — PROGRESS NOTES
[facility] Critical Care Medicine       Date:  5/11/2024  Patient:  Bing Holliday  YOB: 1960  MRN:  [id number]   Admit Date:  5/7/2024  ========================================================================================================    Chief Complaint   Patient presents with    Altered Mental Status         History of Present Illness:  Bing Holliday is a 63 y.o. 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  History somewhat limited as the patient, while she awakens and is able to answer simple questions, is unable to offer any meaningful history.     Extensive chart review undertaken.  Patient with multiple presentations of the same.  Admitted from 1/17 - 1-28 to [facility] for similar presentation.  Underwent extensive workup there at that time, was treated with stress to steroids, rheumatology consultation who do not believe that she was in acute lupus flare, endocrinology consultation, as well as neurology consultation.  During that admission there was concern for potential seizure-like activity, the patient underwent a video EEG, was found to be in status, and was loaded with Keppra and continued on maintenance dosing.  She ultimately was discharged to skilled nursing facility for ongoing care.  At the time of discharge her noted prednisone dosing was 15 mg daily.     She was then readmitted to the hospital 3/9 through 3/14 with hypothermia, hyponatremia, altered mental status, and hypoglycemia.  She again underwent an extensive workup, was seen by endocrinology, manage on stress dose steroids, no infectious etiology elucidated  despite a very exhaustive search, she improved, and at that time she was discharged on what is documented as 20 mg of prednisone daily.     She then was readmitted to the hospital 3/19 through 3/26 with almost identical presentation of hypothermia, hyponatremia, hypoglycemia.  She was treated with stress dose steroids, underwent an MRI of the brain which was unrevealing, she returned to her baseline, and was discharged back to her skilled nursing facility.  At that time she was discharged home and is reported to be 20 mg of prednisone daily.     She then was readmitted to the hospital 4/24 to 4/30 for exactly the same presentation including hypoglycemia, hallucinations, hypothermia, and altered mental status.  And was treated in a very similar fashion including stress dose steroids.  She underwent an infectious workup, was treated for community-acquired pneumonia, and ultimately improved.  She was discharged with what was reported to be 20 mg of prednisone daily.     She presents today with hyperglycemia.  She received glucagon prior to arrival.  In the emergency department she was found to be leukopenic, hyponatremic, with a hemoglobin of 7.8 which is near her baseline hemoglobin.  She was given 1 g of ceftriaxone, 100 mg of hydrocortisone, and placed on a Allan hugger.  She was referred to the ICU for admission given her multiple medical comorbidities.       I was able to discuss case with the patient's skilled nursing facility, they note that she is getting 15 mg of prednisone daily. The recent discharge summary is from her prior admissions, the patient was to be discharged on 20 mg of prednisone as this is a dose that she has been well on.          Interval ICU Events:     5/7: Patient admitted to the ICU.  Seen and examined in the emergency department.  Somnolent but arouses to voice and follows simple commands.  Is without complaint.  Attempted to reach out to the patient's daughter who did not answer the  "phone.    5/8: No acute overnight events.  Patient remains easily arousable to voice but minimally interactive.  Hypothermia improving.  Hypoglycemia resolved.  Creatinine stable.  Urinalysis consistent with urinary tract infection.  Endocrinology consultation completed, agree with stress dose steroids.  EEG to be performed this morning.  Keppra level therapeutic.  Procalcitonin pending.    5/9: No acute overnight events.  Asymptomatic bradycardia noted on telemetry.  Patient becoming more responsive, agitated at times.  Discussed with son at bedside yesterday, notes that this is normal progression of her recovery when she is been hospitalized before.  No seizure activity reported on EEG.  Neurology consultation completed, recommending EEG, MRI when able.  Hypothermia improved, off Allan hugger.  Remains on stress dose steroids.  Urine culture finalized as negative.  Blood cultures prelim negative.  Urine antigens negative.  Anemic this morning with a hemoglobin of 6.9, 1 unit PRBCs ordered.    5/10: No acute overnight events.  Remains with asymptomatic bradycardia.  Electrophysiology saw the patient yesterday, may in the future require a pacemaker however no acute intervention as she is asymptomatic.  Mental status improving this morning, more alert and responsive to name, says \"good morning\" but does not answer other questions appropriately.  LP attempted at bedside yesterday without success, plan for potential IR guided LP today however given improving mental status with stress dose steroids low suspicion for bacterial meningitis/viral encephalitis remains low but given her immunocompromise state will need to rule out.    5/11: No acute overnight events.  Patient's mental status unchanged.  Awakens to voice, does not answer questions appropriately, however she appears that she is trying to say hospital when asked where she is.  LP completed yesterday, cultures pending.  Rapid meningitis panel negative.  All " cultures have remained negative to date.  Bradycardia has resolved.  Glucose stable    Medical History:  Past Medical History:   Diagnosis Date    Abnormal kidney function 04/04/2023    Acute headache 03/10/2024    Acute low back pain 10/30/2023    Acute upper respiratory infection, unspecified 03/04/2020    Acute URI    Acute upper respiratory infection, unspecified 09/30/2015    URTI (acute upper respiratory infection)    Arthritis     Atypical facial pain 03/10/2024    Body mass index (BMI) 23.0-23.9, adult 10/15/2021    BMI 23.0-23.9, adult    Body mass index (BMI) 33.0-33.9, adult 03/04/2020    BMI 33.0-33.9,adult    Cardiomegaly 08/27/2013    Left ventricular hypertrophy    Chest pain 06/10/2022    Comment on above: Added by Problem List Migration; 2013-3-12; Moved to Suppressed Nov 25 2013 9:16PM; Comment on above: Added by Problem List Migration; 2013-3-12; Moved to Suppressed Nov 25 2013 9:16PM;    Chronic kidney disease, stage 3 unspecified (Multi) 07/02/2013    Chronic kidney disease, stage III (moderate)    Confusional state 03/10/2024    Contact with and (suspected) exposure to covid-19 04/04/2023    Delirium 03/10/2024    Disease of pericardium, unspecified ([facility]) 07/02/2013    Pericardial disease    Encounter for follow-up examination after completed treatment for conditions other than malignant neoplasm 10/06/2022    Hospital discharge follow-up    Generalized contraction of visual field, right eye 01/29/2015    Generalized contraction of visual field of right eye    Hearing loss 03/10/2024    History of cataract 03/10/2024    History of thrombocytopenia 03/10/2024    Comment on above: Added by Problem List Migration; 2013-7-2;    Homonymous bilateral field defects, right side 04/29/2016    Homonymous bilateral field defects of right side    Hypertensive chronic kidney disease with stage 1 through stage 4 chronic kidney disease, or unspecified chronic kidney disease 07/02/2013    Nephrosclerosis     Laceration without foreign body, left foot, initial encounter 07/03/2018    Foot laceration, left, initial encounter    Localized edema 03/10/2024    Mass of skin 03/10/2024    Migraine with aura, not intractable, without status migrainosus 10/24/2022    Ocular migraine    Open wound 03/10/2024    Open wound of left foot 03/10/2024    Other conditions influencing health status 07/02/2013    Chronic Glomerulonephritis In Diseases Classified Elsewhere    Other conditions influencing health status 07/02/2013    Progressive Familial Myoclonic Epilepsy    Other conditions influencing health status 07/02/2013    Protein S Deficiency    Other conditions influencing health status 05/22/2015    Familial Combined Hyperlipidemia    Other conditions influencing health status 10/24/2022    IDDM (insulin dependent diabetes mellitus)    Other conditions influencing health status 03/14/2022    Diabetes mellitus, insulin dependent (IDDM), uncontrolled    Other long term (current) drug therapy 10/24/2022    Long-term use of Plaquenil    Overweight with body mass index (BMI) 25.0-29.9 03/10/2024    Pain of toe 03/10/2024    Personal history of COVID-19 04/04/2023    Personal history of diseases of the blood and blood-forming organs and certain disorders involving the immune mechanism 07/02/2013    History of thrombocytopenia    Personal history of diseases of the skin and subcutaneous tissue 08/11/2015    History of foot ulcer    Personal history of nephrotic syndrome 07/02/2013    History of nephrotic syndrome    Personal history of other diseases of the circulatory system 08/27/2013    History of sinus tachycardia    Personal history of other diseases of the nervous system and sense organs     History of cataract    Personal history of other diseases of the respiratory system     History of bronchitis    Personal history of other infectious and parasitic diseases 07/02/2013    History of hepatitis    Personal history of other  specified conditions     History of shortness of breath    Personal history of other specified conditions 08/27/2013    History of edema    Posterior epistaxis 03/10/2024    Puckering of macula, right eye 10/24/2022    ERM OD (epiretinal membrane, right eye)    Raynaud's syndrome without gangrene 07/02/2013    Raynaud's disease    Sinusitis 03/10/2024    Systemic lupus erythematosus, unspecified (Multi) 07/24/2015    SLE (systemic lupus erythematosus)    Systemic lupus erythematosus, unspecified (Multi) 07/24/2015    SLE (systemic lupus erythematosus)    Systemic lupus erythematosus, unspecified (Multi) 07/24/2015    Systemic lupus    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) 07/02/2013    Type 2 diabetes with nephropathy    Type 2 diabetes mellitus with mild nonproliferative diabetic retinopathy without macular edema, left eye (Multi) 07/27/2015    Non-proliferative diabetic retinopathy, left eye    Type 2 diabetes mellitus with mild nonproliferative diabetic retinopathy without macular edema, unspecified eye (Multi) 07/24/2015    Mild non proliferative diabetic retinopathy    Type 2 diabetes mellitus with proliferative diabetic retinopathy without macular edema, right eye (Multi) 07/27/2015    Proliferative diabetic retinopathy of right eye    Type 2 diabetes mellitus with proliferative diabetic retinopathy without macular edema, unspecified eye (Multi) 07/24/2015    Diabetic proliferative retinopathy    Unspecified acute and subacute iridocyclitis 07/24/2015    Acute iritis, right eye    Unspecified open wound, left foot, sequela 07/03/2018    Wound, open, foot, left, sequela     Past Surgical History:   Procedure Laterality Date    ANKLE SURGERY  01/29/2015    Ankle Surgery    CHOLECYSTECTOMY  01/29/2015    Cholecystectomy    CT GUIDED PERCUTANEOUS BIOPSY BONE DEEP  5/4/2021    CT GUIDED PERCUTANEOUS BIOPSY BONE DEEP 5/4/2021 [facility] CLINICAL LEGACY    EYE SURGERY  03/06/2015    Eye Surgery    FOOT  SURGERY  01/29/2015    Foot Surgery    MR HEAD ANGIO WO IV CONTRAST  7/26/2013    MR HEAD ANGIO WO IV CONTRAST 7/26/2013 [facility] CLINICAL LEGACY    MR HEAD ANGIO WO IV CONTRAST  9/17/2021    MR HEAD ANGIO WO IV CONTRAST 9/17/2021 [facility]    MR HEAD ANGIO WO IV CONTRAST  3/25/2023    MR HEAD ANGIO WO IV CONTRAST [facility]    MR NECK ANGIO WO IV CONTRAST  7/26/2013    MR NECK ANGIO WO IV CONTRAST 7/26/2013 [facility] CLINICAL LEGACY    MR NECK ANGIO WO IV CONTRAST  9/17/2021    MR NECK ANGIO WO IV CONTRAST 9/17/2021 [facility]    MR NECK ANGIO WO IV CONTRAST  3/25/2023    MR NECK ANGIO WO IV CONTRAST [facility]    OTHER SURGICAL HISTORY  01/29/2015    Creation Of Pericardial Window    OTHER SURGICAL HISTORY  01/29/2015    Quadricepsplasty    TOTAL HIP ARTHROPLASTY  01/29/2015    Hip Replacement     Medications Prior to Admission   Medication Sig Dispense Refill Last Dose    apixaban (Eliquis) 2.5 mg tablet Take 1 tablet (2.5 mg) by mouth 2 times a day. 60 tablet 1 5/7/2024 at 0900    fluticasone-umeclidin-vilanter (TRELEGY-ELLIPTA) 200-62.5-25 mcg blister with device Inhale 1 puff once daily. 60 each 5 5/7/2024 at 0900    folic acid (Folvite) 1 mg tablet TAKE 1 TABLET BY MOUTH EVERY DAY 90 tablet 1 5/7/2024 at 0900    glucagon HCL (Glucagon, HCl, Emergency Kit) 1 mg recon soln Inject 1 mg into the muscle if needed.   5/7/2024 at 1225    guaiFENesin (Mucinex) 600 mg 12 hr tablet Take 2 tablets (1,200 mg) by mouth 2 times a day. Do not crush, chew, or split.   5/7/2024 at 0900    insulin glargine (Lantus U-100 Insulin) 100 unit/mL injection Inject 10 Units under the skin once daily in the morning. Take as directed per insulin instructions.   5/7/2024 at 0800    insulin lispro (HumaLOG) 100 unit/mL injection Inject under the skin 3 times a day as needed for high blood sugar (before meals). Take as directed per insulin Sliding Scale instructions.  0-150 = 0 units  151-200 = 2 units  201-250 = 4 units  251-300 = 6  units  301-350 = 8 units  351-400 = 10 units  >400 = give 10 units and contact MD   5/7/2024 at 0730- 8 UNITS GIVEN    levETIRAcetam (Keppra) 500 mg tablet Take 1 tablet (500 mg) by mouth every 12 hours. 60 tablet 0 5/7/2024 at 0900    magnesium oxide (Mag-Ox) 400 mg (241.3 mg magnesium) tablet Take 2 tablets (800 mg) by mouth once daily. Do not fill before May 1, 2024.   5/7/2024 at 0900    multivitamin with minerals tablet Take 1 tablet by mouth once daily.   5/7/2024 at 0900    mycophenolate (Cellcept) 500 mg tablet TAKE 2 TABLETS BY MOUTH TWICE A  tablet 0 5/7/2024 at 0900    nut.tx.gluc intol,lf,soy/fiber (BOOST GLUCOSE CONTROL ORAL) Take 8 fluid ounces by mouth once daily in the morning.   5/7/2024 at 0900    OLANZapine (ZyPREXA) 5 mg tablet Take 1 tablet (5 mg) by mouth 2 times a day.   5/7/2024 at 0900    pantoprazole (ProtoNix) 40 mg EC tablet TAKE 1 TABLET BY MOUTH EVERY DAY 90 tablet 1 5/7/2024 at 0800    predniSONE (Deltasone) 5 mg tablet Take 3 tablets (15 mg) by mouth once daily.   5/7/2024 at 0900    thiamine 100 mg tablet Take 1 tablet (100 mg) by mouth once daily.   5/7/2024 at 0900    acetaminophen (Tylenol) 325 mg tablet Take 2 tablets (650 mg) by mouth every 4 hours if needed for mild pain (1 - 3), moderate pain (4 - 6) or fever (temp greater than 38.0 C).   5/2/2024    acetaminophen (Tylenol) 500 mg tablet Take 2 tablets (1,000 mg) by mouth every 4 hours if needed for mild pain (1 - 3) or moderate pain (4 - 6).   Unknown    albuterol 90 mcg/actuation inhaler Inhale 2 puffs every 6 hours if needed for shortness of breath or wheezing.   Unknown    atorvastatin (Lipitor) 40 mg tablet Take 1 tablet (40 mg) by mouth once daily. (Patient taking differently: Take 1 tablet (40 mg) by mouth once daily at bedtime.) 90 tablet 3 5/6/2024 at 2100    balsam peru-castor oiL (Venelex) ointment Apply topically 2 times a day. APPLY TO BUTTOCKS, SACRUM, AND THIGHS.   5/6/2024 at PM    blood-glucose sensor  "(Dexcom G6 Sensor) device Use to check sugars 3 times daily 4 each 2     Dexcom G4 platinum  (Dexcom G6 ) misc Use as instructed 1 each 0     Dexcom G4 platinum transmitter (Dexcom G6 Transmitter) device Use as instructed 1 each 0     meclizine (Antivert) 25 mg tablet Take 1 tablet (25 mg) by mouth every 12 hours if needed for dizziness.   Unknown    melatonin 5 mg tablet Take 1 tablet (5 mg) by mouth once daily at bedtime.   5/6/2024 at 2100    pen needle, diabetic 31 gauge x 5/16\" needle Use to inject 1-4 times daily as directed. 100 each 11      Ace inhibitors, Hydroxychloroquine, Lisinopril, Penicillins, and Sulfa (sulfonamide antibiotics)  Social History     Tobacco Use    Smoking status: Never     Passive exposure: Never    Smokeless tobacco: Never   Vaping Use    Vaping status: Never Used   Substance Use Topics    Alcohol use: Not Currently     Comment: RARE    Drug use: Never     Family History   Problem Relation Name Age of Onset    Other (RENAL DISEASE) Mother          END STAGE    Other (CARDIAC DISORDER) Mother      Cataracts Mother      Stroke Mother      Diabetes Mother      Kidney disease Mother      Lupus Mother      Other (CARDIAC DISORDER) Father      COPD Father      Glaucoma Father      Hypertension Father      Sleep apnea Father      Other (CARDIAC DISORDER) Sister      Depression Sister      Kidney disease Sister      Sickle cell trait Sister      Sleep apnea Daughter         Review of Systems:  14 point review of systems was completed and negative except for those specially mention in my HPI    Physical Exam:    Heart Rate:  []   Temp:  [35.4 °C (95.7 °F)-37.6 °C (99.7 °F)]   Resp:  [8-64]   BP: (126-168)/()   Weight:  [102 kg (225 lb 12 oz)]   SpO2:  [91 %-100 %]     Physical Exam  Vitals and nursing note reviewed.   Constitutional:       Appearance: She is ill-appearing.      Comments: Opens eyes to voice, does not answer questions appropriately    HENT:      " Head: Normocephalic and atraumatic.      Mouth/Throat:      Mouth: Mucous membranes are dry.   Eyes:      Conjunctiva/sclera: Conjunctivae normal.      Pupils: Pupils are equal, round, and reactive to light.   Cardiovascular:      Rate and Rhythm: Normal rate and regular rhythm.   Pulmonary:      Effort: Pulmonary effort is normal. No respiratory distress.      Breath sounds: Normal breath sounds.      Comments: Moist cough  Abdominal:      General: Abdomen is flat. There is no distension.      Tenderness: There is abdominal tenderness (Diffusely tender).   Musculoskeletal:         General: Tenderness (With palpation of both legs) present. Normal range of motion.      Cervical back: Normal range of motion and neck supple.      Right lower leg: No edema.      Left lower leg: No edema.   Skin:     Capillary Refill: Capillary refill takes less than 2 seconds.   Neurological:      General: No focal deficit present.      Mental Status: She is disoriented.         Objective:    I have reviewed all medications, laboratory results, and imaging pertinent for today's encounter    Assessment/Plan:    I am currently managing this critically ill patient for the following problems:    Acute encephalopathy: Multiple presentations of the same, believed to be due to adrenal insufficiency.  Rule out septic encephalopathy  History of lupus cerebritis  History of seizure (believed to be focal seizure in nature based on chart review)  Hypoxia on supplemental O2   History of coronary artery disease  History of atrial fibrillation on Eliquis  Hx of heart failure with reduced EF not in acute exacerbation   History of GERD  Recurrent hypoglycemic episodes believed to be due to adrenal insufficiency - possibly due to med rec error.   History of type 2 diabetes now with mild hyperglycemia   History of adrenal insufficiency  Chronic anemia  History of lupus  Immune suppressed     Neuro/Psych/Pain Ctrl/Sedation:  Tylenol available for pain  control  Hold Zyprexa   Continue Keppra given her history of seizure, level therapeutic   EEG neg for seizure   Neuro recs reviewed: MRI, will try to complete today  LP completed, cultures pending     Respiratory/ENT:  Maintain SpO2 greater than 90%, currently on 5 L nasal cannula  DuoNebs Q6  Home inhalers       Cardiovascular:  Maintain MAP greater than 65, not currently on any vasoactive agents  Hold Statin  Has been unable to tolerate GDMT in the past due to allergies and other comorbidities  ASA given hx of CAD   Hold Eliquis (NPO) - will defer AC currently given decline in HGB, potential need for further procedures  Bradycardia asymptomatic - observe for now   Cardiogram with ejection fraction of 40 to 45%, unchanged from prior in May     GI:  NPO pending improvement in mental status   Home PPI  Last Corpak replaced today to initiate tube feedings, resume oral medications     Renal/Volume Status (Intra & Extravascular):  Lytes/UOP Acceptable - no need for RRT  LR @ 100  LR bolus today    Endocrine  Stress dose steroids 100mg Q8  ENDO following for steroid dosing   Should be on at least 20mg Prednisone NOT 15mg when acute process resolved      Infectious Disease:  Empiric Meningitis Coverage pending LP - Ceftriaxone, Vanco, Acyclovir with a plan to discontinue the next 48 hours if all cultures remain negative     Heme/Onc:  Transfuse for symptomatic anemia, no current indication   Hold MMF      Ethics/Code Status:  Full Code     :  DVT Prophylaxis: SCDs/Heparin  GI Prophylaxis: PPI  Diet: NPO  CVC: NA  [place]: NA  [place]: NA  Restraints: NA  Dispo: Floor     I have personally spent 35 minutes of critical care time, exclusive of time spent on any procedures, in evaluation and management of this critically ill patient        Hay Mckeon DO

## 2024-05-11 NOTE — PROGRESS NOTES
[facility] Progress Note               Rounding Cardiologist:  Antonio Rodriges MD, MD   Primary Cardiologist: Dr. Antonio Rodriges    Date:  5/11/2024  Patient:  Bing Holliday  YOB: 1960  MRN:  [id number]   Admit Date:  5/7/2024      SUBJECTIVE    Bing Holliday was seen and examined today at bedside.  Patient is still waking up with voice command  Not following commands  Cultures pending  Telemetry shows sinus rhythm rates between 50-70 bpm    EKG today shows sinus rhythm at a rate of 73 bpm QRS ration 80 ms QT corrected 136 ms.  Laboratory today shows sodium 146 potassium 4.4 chloride 111 BUN 23 creatinine 1.53 GFR 38 ALT 18 AST 17 magnesium 1.9 bilirubin total 0.2.  WBC 5.0.  Hemoglobin 7.6.  Hematocrit 24.5.  Platelet count 102.  Echocardiogram May 10, 2024    CONCLUSIONS:   1. Left ventricular systolic function is mildly to moderately decreased with a 40-45% estimated ejection fraction.   2. There is moderate concentric left ventricular hypertrophy.   3. There is no evidence of mitral valve stenosis.   4. Mild to moderate mitral valve regurgitation.   5. Mild tricuspid regurgitation is visualized.   6. Aortic valve stenosis is not present.   7. Pulmonary hypertension is present.   8. Severely elevated pulmonary artery pressure.   9. The pulmonary artery is not well visualized.  10. There is global hypokinesis of the left ventricle with minor regional variations.  VITALS     Vitals:    05/11/24 0600 05/11/24 0700 05/11/24 0800 05/11/24 0900   BP: 139/52 142/53 143/61 143/53   BP Location: Left arm Left arm Left arm    Patient Position: Lying Lying Lying    Pulse: 64 60 68 50   Resp: 14 13 15 12   Temp:       TempSrc:       SpO2: 97% 95% 94% 96%   Weight:       Height:           Intake/Output Summary (Last 24 hours) at 5/11/2024 1011  Last data filed at 5/11/2024 0621  Gross per 24 hour   Intake 3446.9 ml   Output 925 ml   Net 2521.9 ml       [unfilled]    PHYSICAL EXAM   Vitals and nursing note  reviewed.   Constitutional:       Appearance: She is ill-appearing.      Comments: Opens eyes to voice, does not answer questions appropriately    HENT:      Head: Normocephalic and atraumatic.      Mouth/Throat:      Mouth: Mucous membranes are dry.   Eyes:      Conjunctiva/sclera: Conjunctivae normal.      Pupils: Pupils are equal, round, and reactive to light.   Cardiovascular:      Rate and Rhythm: Normal rate and regular rhythm.   Pulmonary:      Effort: Pulmonary effort is normal. No respiratory distress.      Breath sounds: Normal breath sounds.      Comments: Moist cough  Abdominal:      General: Abdomen is flat. There is no distension.      Tenderness: There is abdominal tenderness (Diffusely tender).   Musculoskeletal:         General: Tenderness (With palpation of both legs) present. Normal range of motion.      Cervical back: Normal range of motion and neck supple.      Right lower leg: No edema.      Left lower leg: No edema.   Skin:     Capillary Refill: Capillary refill takes less than 2 seconds.   Neurological:      General: No focal deficit present.      Mental Status: She is disoriented.       DIAGNOSTIC RESULTS   EKG:     Telemetry: Sinus rhythm.  Rates between 50-70 bpm.      LAB DATA   BMP:  @LABRCNT(Na:3,K:3,CL:3,CO2:3,Bun:3,Creatinine:3,Glu:3, CA:3,LABGLOM)@    Cardiac Enzymes:  @LABRCNT(CKTOTAL:3,CKMB:3,CKMBINDEX:3,TROPONINI:3)@    CBC:   Lab Results   Component Value Date    WBC 5.0 05/11/2024    RBC 2.56 (L) 05/11/2024    HGB 7.6 (L) 05/11/2024    HCT 24.5 (L) 05/11/2024     (L) 05/11/2024       CMP:    Lab Results   Component Value Date     (H) 05/11/2024    K 4.4 05/11/2024     (H) 05/11/2024    CO2 29 05/11/2024    BUN 23 05/11/2024    CREATININE 1.53 (H) 05/11/2024    GLUCOSE 129 (H) 05/11/2024    CALCIUM 8.1 (L) 05/11/2024       Hepatic Function Panel:    Lab Results   Component Value Date    ALKPHOS 112 05/11/2024    ALT 18 05/11/2024    AST 17 05/11/2024    PROT  "4.9 (L) 05/11/2024    BILITOT 0.2 05/11/2024       Magnesium:    Lab Results   Component Value Date    MG 1.91 05/11/2024       PT/INR:  No results found for: \"PROTIME\", \"INR\"  @LABRCNT(inr:3)@     HgBA1c:  No components found for: \"LABA1C\"    Lipid Profile:  No results found for: \"CHLPL\", \"TRIG\", \"HDL\", \"LDLCALC\", \"LDLDIRECT\"    TSH:  No results found for: \"TSH\"    ABG:  No results found for: \"PH\"    PRO-BNP: No results found for: \"PROBNP\"       RADIOLOGY     XR chest 1 view   Final Result   1. Left IJ central line with the tip overlying the region of the   right atrium. No radiographic evidence for pneumothorax.   2. Radiopaque tubing at the soft tissues of the right side of the   neck, may represent a peripherally inserted catheter. Clinical   correlation recommended.   3. Cardiomegaly. Vascular congestion. Bilateral airspace opacities,   may be secondary to pulmonary edema and/or pneumonia.                  MACRO:   None.        Signed by: Ceci Westbrook 5/10/2024 8:06 PM   Dictation workstation:   [id number]      Transthoracic Echo (TTE) Limited   Final Result      FL guided lumbar puncture   Final Result   Fluoroscopic guided lumbar puncture attempt. Only a very small volume   of slightly blood-tinged CSF was able to be obtained and then flow of   fluid stopped. The small volume of fluid obtained was submitted for   laboratory analysis.        MACRO:   None.        Signed by: Hay Tucker 5/10/2024 2:10 PM   Dictation workstation:   [id number]      CT head wo IV contrast   Final Result   No evidence of acute cortical infarct or intracranial hemorrhage.        Stable chronic changes.        MACRO:   None        Signed by: Higinio Sanchez 5/7/2024 5:43 PM   Dictation workstation:   [id number]      XR chest 1 view   Final Result   Diffuse interstitial infiltrates bilaterally, as above. Clinical   correlation and continued follow-up until clearing is recommended.        MACRO:   None.        Signed by: Nic" Pradip 5/7/2024 2:07 PM   Dictation workstation:   [id number]      MR brain w and wo IV contrast    (Results Pending)       [unfilled]      CURRENT MEDICATIONS    acyclovir, 10 mg/kg (Ideal), intravenous, q8h  [Held by provider] apixaban, 2.5 mg, oral, q12h  [Held by provider] atorvastatin, 40 mg, oral, Daily  cefTRIAXone, 2 g, intravenous, q24h  [Held by provider] fluticasone-umeclidin-vilanter, 1 puff, inhalation, Daily  [Held by provider] folic acid, 1 mg, oral, Daily  heparin (porcine), 5,000 Units, subcutaneous, q8h  hydrocortisone sodium succinate, 100 mg, intravenous, q8h  insulin lispro, 0-10 Units, subcutaneous, q4h  ipratropium-albuteroL, 3 mL, nebulization, TID  lactated Ringer's, 1,000 mL, intravenous, Once  [Held by provider] levETIRAcetam, 500 mg, oral, BID  levETIRAcetam, 500 mg, intravenous, q12h  [Held by provider] melatonin, 5 mg, oral, Nightly  [Held by provider] multivitamin with minerals, 1 tablet, oral, Daily  [Held by provider] mycophenolate, 1,000 mg, oral, BID  [Held by provider] OLANZapine, 5 mg, oral, BID  pantoprazole, 40 mg, intravenous, Daily  [Held by provider] predniSONE, 15 mg, oral, Daily  [Held by provider] thiamine, 100 mg, oral, Daily  vancomycin, 1,250 mg, intravenous, q24h      lactated Ringer's, 100 mL/hr, Last Rate: 100 mL/hr (05/11/24 0820)          ASSESSMENT   Principal Problem:    Disorientation        Patient Active Problem List   Diagnosis    COVID-19    Lupus (Multi)    Ambulatory dysfunction    Myelodysplastic syndrome, unspecified (Multi)    Lupus vasculitis (Multi)    Hyperlipidemia    Pneumonia of both lower lobes due to infectious organism    Hallucinations    Leg swelling    Hyperglycemia    JED (acute kidney injury) (CMS-HCC)    Hypernatremia    Proliferative diabetic retinopathy of right eye without macular edema determined by examination associated with type 2 diabetes mellitus (Multi)    Urinary incontinence    Paraparesis (Multi)    Abdominal cramping     Abnormal echocardiogram    Abnormal gait    Abnormal thyroid blood test    Advanced COPD (Multi)    Afferent pupillary defect of right eye    Anemia    Pancytopenia (Multi)    Altitudinal scotoma of right eye    Arcuate scotoma of left eye    Arthropathy    Asymptomatic PVCs    PAF (paroxysmal atrial fibrillation) (Multi)    Balance problem    Bilateral high frequency sensorineural hearing loss    Bradycardia    Branch retinal artery occlusion    Cardiomyopathy (Multi)    Chronic diarrhea    Chronic fatigue    Chronic kidney disease (CKD), stage III (moderate) (Multi)    CKD stage G3a/A2, GFR 45-59 and albumin creatinine ratio  mg/g (Multi)    Combined forms of age-related cataract of left eye    Combined forms of age-related cataract of right eye    Contracture of hand    Snoring    CVA (cerebral vascular accident) (Multi)    Daytime somnolence    Degeneration of lumbar/lumbosacral disc without myelopathy    Depression, major    Dizziness    Dupuytren's contracture    Eczema    Elevated alkaline phosphatase level    Encephalopathy acute    Facial twitching    Fibromyalgia    Fungal nail infection    GERD (gastroesophageal reflux disease)    Gouty arthropathy    H/O iritis    H/O orthostatic hypotension    Hereditary elliptocytosis (CMS-HCC)    High level of cardiac marker    Hip hematoma, right    Hypothermia    Insomnia    Leg edema, left    Loss of consciousness (Multi)    Low blood sugar reading    Lung nodule, multiple    Lupus nephritis (Multi)    Metabolic encephalopathy    Muscle cramps    Nodule of skin of left lower leg    Obstructive lung disease (Multi)    Osteoarthritis of left hand    Osteoarthritis of right hand    Osteopenia    Osteoporosis    Palpitations    Peripheral visual field defect    Persistent proteinuria    Positive colorectal cancer screening using Cologuard test    Benign essential HTN    Psychosis (Multi)    Pulmonary hypertension (Multi)    Refractive error    Hypertensive  retinopathy    Retinal neovascularization    Secondary pulmonary arterial hypertension (Multi)    Shortness of breath on exertion    Spinal stenosis of lumbar region with neurogenic claudication    Subjective tinnitus of both ears    Swelling of right foot    Syncope and collapse    Thrombophlebitis of superficial veins of left lower extremity    Tinnitus of left ear    Vitamin D deficiency    DM type 2 with diabetic peripheral neuropathy (Multi)    Systemic lupus erythematosus (Multi)    Diabetic nephropathy (Multi)    Functional diarrhea    UTI (urinary tract infection)    Moderate protein-calorie malnutrition (Multi)    Shock, unspecified (Multi)    Hyperkalemia    Adrenal insufficiency (Multi)    Seizure-like activity (Multi)    Meningitis (HHS-HCC)    Encephalopathy    Seizure (Multi)    Uncontrolled blood glucose    Cervical spondylosis with myelopathy    COPD (chronic obstructive pulmonary disease) (Multi)    Encounter for diabetes education    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  Marked sinus bradycardia  2.  Hypertension  3.  Lupus  4.  Chronic kidney disease  5.  Anemia  6.  History of coronary artery disease with coronary artery bypass graft surgery in 2013  7.  History of atrial fibrillation on Eliquis therapy  8.  History of adrenal insufficiency  9.  Cardiomyopathy with a left ventricular ejection fraction n 40% per echocardiogram in March. 24     PLAN     Continue with telemetry for  now.  Continue with primary  team recommendations and treatment  At some point, once heart rate is stable, we can resume low-dose beta-blocker therapy.  As a long-term depending of patient's prognosis, we may have to discuss with patient and family members about pacemaker implantation.  Will continue to follow her during this admission    Antonio Rodriges MD      Please do not hesitate to call with questions.  Electronically signed by Antonio Rodriges MD, [facility] on 5/11/2024 at 10:11 AM

## 2024-05-11 NOTE — PROGRESS NOTES
"Bing Holliday is a 63 y.o. female on day 4 of admission presenting with Disorientation.      Subjective   Pt. In bed, moaning and groaning, when asked if in pain she answered \"yes\", otherwise is not able to localize or verbalize further. She is able to track with eyes, not following commands. Her HR continues to fluctuate between 52-96 today, -160/ DBP 52-84. Normothermic. LP yesterday, unable to get OP d/t patient agitation. Molecular workup is negative thus far with pending results. MRI w/wo pending.  ROS deferred.        Objective     Last Recorded Vitals  Blood pressure 143/53, pulse 50, temperature 37.6 °C (99.7 °F), temperature source Temporal, resp. rate 12, height 1.676 m (5' 6\"), weight 102 kg (225 lb 12 oz), SpO2 96%.    Physical Exam  Neurological Exam  LIMITED NEURO EXAM    MENTAL STATUS    LOC track with eyes, not following commands    CRANIAL NERVES    CN II-blinked to threat    CN II/III-PERRLA      CN V/VII-corneal reflex present, no facial asymmetry, grimace response present    SENSORY AND MOTOR    Withdrawals from a painful stimulus    REFLEXES    Plantar response present    No posturing reflexes    COORDINATION AND GAIT    Untestable in critically ill patient d/t sedation    *Limited Neurological exam was completed d/t patient being critically ill, on sedation, inattentive, or uncooperative, etc.   Relevant Results                    Nasim Coma Scale  Best Eye Response: To verbal stimuli  Best Verbal Response: Incomprehensible sounds  Best Motor Response: None  Nasim Coma Scale Score: 6                             Assessment/Plan      Principal Problem:    Disorientation    Impression:  Acute and recurrent encephalopathy - differential includes infectious or autoimmune encephalitis/meningitis, cryptococcus, SLE cerebritis, metabolic encephalopathy  cvEEG 5/8-9 - moderate diffuse encephalopathy; no epileptiform activity  C3-119 C4-50 KATHI-(SM/RNP >8.0 AnitSSA-1.0 Anti-Chromatin-2.1) " ESR/CRP-29/1.02 HIV-non reactive  Plan:   Continue with current therapy   May consider repeat LP in OR to get OP  Will request cell count on Tube #4 to r/o meningeal bleeding  Molecular pending  MRI brain w/wo pending  I have discussed the patient and the plan of care with the Neurologist on-call, Dr. Bethea.           I spent 35 minutes in the professional and overall care of this patient.      Bailee Woodward, JIM-CNP

## 2024-05-12 ENCOUNTER — APPOINTMENT (OUTPATIENT)
Dept: CARDIOLOGY | Facility: HOSPITAL | Age: 64
DRG: 981 | End: 2024-05-12
Payer: MEDICARE

## 2024-05-12 LAB
ALBUMIN SERPL BCP-MCNC: 2.6 G/DL (ref 3.4–5)
ALP SERPL-CCNC: 114 U/L (ref 33–136)
ALT SERPL W P-5'-P-CCNC: 17 U/L (ref 7–45)
ANION GAP SERPL CALC-SCNC: 11 MMOL/L (ref 10–20)
AST SERPL W P-5'-P-CCNC: 17 U/L (ref 9–39)
ATRIAL RATE: 326 BPM
ATRIAL RATE: 53 BPM
BASO STIPL BLD QL SMEAR: PRESENT
BASOPHILS # BLD MANUAL: 0 X10*3/UL (ref 0–0.1)
BASOPHILS NFR BLD MANUAL: 0 %
BILIRUB SERPL-MCNC: 0.3 MG/DL (ref 0–1.2)
BUN SERPL-MCNC: 21 MG/DL (ref 6–23)
BURR CELLS BLD QL SMEAR: ABNORMAL
CALCIUM SERPL-MCNC: 8.1 MG/DL (ref 8.6–10.3)
CHLORIDE SERPL-SCNC: 110 MMOL/L (ref 98–107)
CO2 SERPL-SCNC: 29 MMOL/L (ref 21–32)
CREAT SERPL-MCNC: 1.47 MG/DL (ref 0.5–1.05)
EGFRCR SERPLBLD CKD-EPI 2021: 40 ML/MIN/1.73M*2
EOSINOPHIL # BLD MANUAL: 0 X10*3/UL (ref 0–0.7)
EOSINOPHIL NFR BLD MANUAL: 0 %
ERYTHROCYTE [DISTWIDTH] IN BLOOD BY AUTOMATED COUNT: 18.2 % (ref 11.5–14.5)
GLUCOSE BLD MANUAL STRIP-MCNC: 143 MG/DL (ref 74–99)
GLUCOSE BLD MANUAL STRIP-MCNC: 147 MG/DL (ref 74–99)
GLUCOSE BLD MANUAL STRIP-MCNC: 162 MG/DL (ref 74–99)
GLUCOSE BLD MANUAL STRIP-MCNC: 169 MG/DL (ref 74–99)
GLUCOSE BLD MANUAL STRIP-MCNC: 173 MG/DL (ref 74–99)
GLUCOSE BLD MANUAL STRIP-MCNC: 184 MG/DL (ref 74–99)
GLUCOSE BLD MANUAL STRIP-MCNC: 186 MG/DL (ref 74–99)
GLUCOSE BLD MANUAL STRIP-MCNC: 503 MG/DL (ref 74–99)
GLUCOSE SERPL-MCNC: 168 MG/DL (ref 74–99)
HCT VFR BLD AUTO: 25.1 % (ref 36–46)
HGB BLD-MCNC: 7.9 G/DL (ref 12–16)
IMM GRANULOCYTES # BLD AUTO: 1.62 X10*3/UL (ref 0–0.7)
IMM GRANULOCYTES NFR BLD AUTO: 21.3 % (ref 0–0.9)
LYMPHOCYTES # BLD MANUAL: 0.38 X10*3/UL (ref 1.2–4.8)
LYMPHOCYTES NFR BLD MANUAL: 5 %
MAGNESIUM SERPL-MCNC: 1.96 MG/DL (ref 1.6–2.4)
MCH RBC QN AUTO: 30 PG (ref 26–34)
MCHC RBC AUTO-ENTMCNC: 31.5 G/DL (ref 32–36)
MCV RBC AUTO: 95 FL (ref 80–100)
METAMYELOCYTES # BLD MANUAL: 0.46 X10*3/UL
METAMYELOCYTES NFR BLD MANUAL: 6 %
MONOCYTES # BLD MANUAL: 0.15 X10*3/UL (ref 0.1–1)
MONOCYTES NFR BLD MANUAL: 2 %
MYELOCYTES # BLD MANUAL: 0.53 X10*3/UL
MYELOCYTES NFR BLD MANUAL: 7 %
NEUTROPHILS # BLD MANUAL: 6.08 X10*3/UL (ref 1.2–7.7)
NEUTS BAND # BLD MANUAL: 0.15 X10*3/UL (ref 0–0.7)
NEUTS BAND NFR BLD MANUAL: 2 %
NEUTS SEG # BLD MANUAL: 5.93 X10*3/UL (ref 1.2–7)
NEUTS SEG NFR BLD MANUAL: 78 %
NRBC BLD-RTO: 1.4 /100 WBCS (ref 0–0)
OVALOCYTES BLD QL SMEAR: ABNORMAL
PHOSPHATE SERPL-MCNC: 3.3 MG/DL (ref 2.5–4.9)
PLATELET # BLD AUTO: 99 X10*3/UL (ref 150–450)
POTASSIUM SERPL-SCNC: 4 MMOL/L (ref 3.5–5.3)
PROT SERPL-MCNC: 5 G/DL (ref 6.4–8.2)
Q ONSET: 215 MS
Q ONSET: 217 MS
QRS COUNT: 13 BEATS
QRS COUNT: 13 BEATS
QRS DURATION: 102 MS
QRS DURATION: 108 MS
QT INTERVAL: 370 MS
QT INTERVAL: 372 MS
QTC CALCULATION(BAZETT): 424 MS
QTC CALCULATION(BAZETT): 429 MS
QTC FREDERICIA: 405 MS
QTC FREDERICIA: 409 MS
R AXIS: -11 DEGREES
R AXIS: -13 DEGREES
RBC # BLD AUTO: 2.63 X10*6/UL (ref 4–5.2)
RBC MORPH BLD: ABNORMAL
SODIUM SERPL-SCNC: 146 MMOL/L (ref 136–145)
T AXIS: -63 DEGREES
T AXIS: 244 DEGREES
T OFFSET: 401 MS
T OFFSET: 402 MS
TOTAL CELLS COUNTED BLD: 100
VANCOMYCIN SERPL-MCNC: 21.7 UG/ML (ref 5–20)
VENTRICULAR RATE: 79 BPM
VENTRICULAR RATE: 80 BPM
WBC # BLD AUTO: 7.6 X10*3/UL (ref 4.4–11.3)

## 2024-05-12 PROCEDURE — 2500000001 HC RX 250 WO HCPCS SELF ADMINISTERED DRUGS (ALT 637 FOR MEDICARE OP): Performed by: EMERGENCY MEDICINE

## 2024-05-12 PROCEDURE — 2500000004 HC RX 250 GENERAL PHARMACY W/ HCPCS (ALT 636 FOR OP/ED): Performed by: HOSPITALIST

## 2024-05-12 PROCEDURE — 80202 ASSAY OF VANCOMYCIN: CPT | Performed by: EMERGENCY MEDICINE

## 2024-05-12 PROCEDURE — 93010 ELECTROCARDIOGRAM REPORT: CPT | Performed by: INTERNAL MEDICINE

## 2024-05-12 PROCEDURE — 2500000001 HC RX 250 WO HCPCS SELF ADMINISTERED DRUGS (ALT 637 FOR MEDICARE OP)

## 2024-05-12 PROCEDURE — 83735 ASSAY OF MAGNESIUM: CPT

## 2024-05-12 PROCEDURE — 37799 UNLISTED PX VASCULAR SURGERY: CPT

## 2024-05-12 PROCEDURE — 99233 SBSQ HOSP IP/OBS HIGH 50: CPT | Performed by: INTERNAL MEDICINE

## 2024-05-12 PROCEDURE — 82947 ASSAY GLUCOSE BLOOD QUANT: CPT | Mod: 91,MUE

## 2024-05-12 PROCEDURE — 2020000001 HC ICU ROOM DAILY

## 2024-05-12 PROCEDURE — 37799 UNLISTED PX VASCULAR SURGERY: CPT | Performed by: EMERGENCY MEDICINE

## 2024-05-12 PROCEDURE — 2500000002 HC RX 250 W HCPCS SELF ADMINISTERED DRUGS (ALT 637 FOR MEDICARE OP, ALT 636 FOR OP/ED): Mod: MUE | Performed by: EMERGENCY MEDICINE

## 2024-05-12 PROCEDURE — 2500000004 HC RX 250 GENERAL PHARMACY W/ HCPCS (ALT 636 FOR OP/ED)

## 2024-05-12 PROCEDURE — 93005 ELECTROCARDIOGRAM TRACING: CPT

## 2024-05-12 PROCEDURE — 85027 COMPLETE CBC AUTOMATED: CPT

## 2024-05-12 PROCEDURE — 94640 AIRWAY INHALATION TREATMENT: CPT

## 2024-05-12 PROCEDURE — 80053 COMPREHEN METABOLIC PANEL: CPT

## 2024-05-12 PROCEDURE — 99291 CRITICAL CARE FIRST HOUR: CPT | Performed by: EMERGENCY MEDICINE

## 2024-05-12 PROCEDURE — 2500000006 HC RX 250 W HCPCS SELF ADMINISTERED DRUGS (ALT 637 FOR ALL PAYERS): Performed by: EMERGENCY MEDICINE

## 2024-05-12 PROCEDURE — 84100 ASSAY OF PHOSPHORUS: CPT | Performed by: HOSPITALIST

## 2024-05-12 PROCEDURE — 85007 BL SMEAR W/DIFF WBC COUNT: CPT

## 2024-05-12 PROCEDURE — 99232 SBSQ HOSP IP/OBS MODERATE 35: CPT | Performed by: STUDENT IN AN ORGANIZED HEALTH CARE EDUCATION/TRAINING PROGRAM

## 2024-05-12 PROCEDURE — C9113 INJ PANTOPRAZOLE SODIUM, VIA: HCPCS | Performed by: EMERGENCY MEDICINE

## 2024-05-12 PROCEDURE — 2500000004 HC RX 250 GENERAL PHARMACY W/ HCPCS (ALT 636 FOR OP/ED): Performed by: EMERGENCY MEDICINE

## 2024-05-12 PROCEDURE — 2500000005 HC RX 250 GENERAL PHARMACY W/O HCPCS: Performed by: EMERGENCY MEDICINE

## 2024-05-12 RX ORDER — ACETAMINOPHEN 650 MG/1
650 SUPPOSITORY RECTAL EVERY 6 HOURS PRN
Status: DISCONTINUED | OUTPATIENT
Start: 2024-05-12 | End: 2024-05-18 | Stop reason: HOSPADM

## 2024-05-12 RX ORDER — OLANZAPINE 5 MG/1
5 TABLET, ORALLY DISINTEGRATING ORAL NIGHTLY
Status: DISCONTINUED | OUTPATIENT
Start: 2024-05-12 | End: 2024-05-18 | Stop reason: HOSPADM

## 2024-05-12 RX ORDER — MAGNESIUM SULFATE HEPTAHYDRATE 40 MG/ML
2 INJECTION, SOLUTION INTRAVENOUS ONCE
Status: COMPLETED | OUTPATIENT
Start: 2024-05-12 | End: 2024-05-12

## 2024-05-12 RX ORDER — OLANZAPINE 10 MG/1
5 TABLET, ORALLY DISINTEGRATING ORAL 2 TIMES DAILY
Status: DISCONTINUED | OUTPATIENT
Start: 2024-05-12 | End: 2024-05-12

## 2024-05-12 RX ORDER — ACETAMINOPHEN 325 MG/1
650 TABLET ORAL EVERY 6 HOURS PRN
Status: DISCONTINUED | OUTPATIENT
Start: 2024-05-12 | End: 2024-05-18 | Stop reason: HOSPADM

## 2024-05-12 RX ADMIN — INSULIN LISPRO 2 UNITS: 100 INJECTION, SOLUTION INTRAVENOUS; SUBCUTANEOUS at 17:52

## 2024-05-12 RX ADMIN — IPRATROPIUM BROMIDE AND ALBUTEROL SULFATE 3 ML: 2.5; .5 SOLUTION RESPIRATORY (INHALATION) at 07:54

## 2024-05-12 RX ADMIN — Medication 2 L/MIN: at 07:00

## 2024-05-12 RX ADMIN — Medication 5 MG: at 20:40

## 2024-05-12 RX ADMIN — Medication 2 L/MIN: at 06:00

## 2024-05-12 RX ADMIN — SODIUM CHLORIDE, POTASSIUM CHLORIDE, SODIUM LACTATE AND CALCIUM CHLORIDE 100 ML/HR: 600; 310; 30; 20 INJECTION, SOLUTION INTRAVENOUS at 08:10

## 2024-05-12 RX ADMIN — Medication 5 L/MIN: at 03:03

## 2024-05-12 RX ADMIN — ATORVASTATIN CALCIUM 40 MG: 20 TABLET, FILM COATED ORAL at 20:40

## 2024-05-12 RX ADMIN — SODIUM CHLORIDE, POTASSIUM CHLORIDE, SODIUM LACTATE AND CALCIUM CHLORIDE 100 ML/HR: 600; 310; 30; 20 INJECTION, SOLUTION INTRAVENOUS at 17:49

## 2024-05-12 RX ADMIN — HYDROCORTISONE SODIUM SUCCINATE 100 MG: 100 INJECTION, POWDER, FOR SOLUTION INTRAMUSCULAR; INTRAVENOUS at 15:14

## 2024-05-12 RX ADMIN — INSULIN LISPRO 2 UNITS: 100 INJECTION, SOLUTION INTRAVENOUS; SUBCUTANEOUS at 02:23

## 2024-05-12 RX ADMIN — APIXABAN 2.5 MG: 5 TABLET, FILM COATED ORAL at 20:40

## 2024-05-12 RX ADMIN — ACYCLOVIR SODIUM 590 MG: 50 INJECTION, SOLUTION INTRAVENOUS at 06:05

## 2024-05-12 RX ADMIN — HYDROCORTISONE SODIUM SUCCINATE 100 MG: 100 INJECTION, POWDER, FOR SOLUTION INTRAMUSCULAR; INTRAVENOUS at 00:09

## 2024-05-12 RX ADMIN — LEVETIRACETAM 500 MG: 5 INJECTION INTRAVENOUS at 22:01

## 2024-05-12 RX ADMIN — Medication 5 L/MIN: at 03:00

## 2024-05-12 RX ADMIN — IPRATROPIUM BROMIDE AND ALBUTEROL SULFATE 3 ML: 2.5; .5 SOLUTION RESPIRATORY (INHALATION) at 13:51

## 2024-05-12 RX ADMIN — Medication 2 L/MIN: at 02:43

## 2024-05-12 RX ADMIN — Medication 2 L/MIN: at 02:00

## 2024-05-12 RX ADMIN — Medication 2 L/MIN: at 05:00

## 2024-05-12 RX ADMIN — Medication 5 L/MIN: at 03:20

## 2024-05-12 RX ADMIN — INSULIN LISPRO 2 UNITS: 100 INJECTION, SOLUTION INTRAVENOUS; SUBCUTANEOUS at 21:47

## 2024-05-12 RX ADMIN — Medication 2 L/MIN: at 04:00

## 2024-05-12 RX ADMIN — ACETAMINOPHEN 650 MG: 650 SUPPOSITORY RECTAL at 03:30

## 2024-05-12 RX ADMIN — Medication 2 L/MIN: at 02:15

## 2024-05-12 RX ADMIN — Medication 5 L/MIN: at 03:10

## 2024-05-12 RX ADMIN — LEVETIRACETAM 500 MG: 5 INJECTION INTRAVENOUS at 08:04

## 2024-05-12 RX ADMIN — HEPARIN SODIUM 5000 UNITS: 5000 INJECTION INTRAVENOUS; SUBCUTANEOUS at 00:09

## 2024-05-12 RX ADMIN — Medication 2 L/MIN: at 12:00

## 2024-05-12 RX ADMIN — INSULIN LISPRO 2 UNITS: 100 INJECTION, SOLUTION INTRAVENOUS; SUBCUTANEOUS at 15:11

## 2024-05-12 RX ADMIN — Medication 5 L/MIN: at 02:56

## 2024-05-12 RX ADMIN — PANTOPRAZOLE SODIUM 40 MG: 40 INJECTION, POWDER, FOR SOLUTION INTRAVENOUS at 08:04

## 2024-05-12 RX ADMIN — MAGNESIUM SULFATE HEPTAHYDRATE 2 G: 40 INJECTION, SOLUTION INTRAVENOUS at 03:20

## 2024-05-12 RX ADMIN — HYDROCORTISONE SODIUM SUCCINATE 100 MG: 100 INJECTION, POWDER, FOR SOLUTION INTRAMUSCULAR; INTRAVENOUS at 08:04

## 2024-05-12 RX ADMIN — Medication 1 L/MIN: at 00:15

## 2024-05-12 RX ADMIN — OLANZAPINE 5 MG: 10 TABLET, ORALLY DISINTEGRATING ORAL at 20:40

## 2024-05-12 RX ADMIN — Medication 5 L/MIN: at 02:57

## 2024-05-12 RX ADMIN — HEPARIN SODIUM 5000 UNITS: 5000 INJECTION INTRAVENOUS; SUBCUTANEOUS at 08:04

## 2024-05-12 RX ADMIN — IPRATROPIUM BROMIDE AND ALBUTEROL SULFATE 3 ML: 2.5; .5 SOLUTION RESPIRATORY (INHALATION) at 19:36

## 2024-05-12 ASSESSMENT — PAIN SCALES - GENERAL
PAINLEVEL_OUTOF10: 0 - NO PAIN
PAINLEVEL_OUTOF10: 0 - NO PAIN

## 2024-05-12 ASSESSMENT — PAIN SCALES - PAIN ASSESSMENT IN ADVANCED DEMENTIA (PAINAD)
TOTALSCORE: 4
BODYLANGUAGE: RELAXED
CONSOLABILITY: NO NEED TO CONSOLE
NEGVOCALIZATION: OCCASIONAL MOAN/GROAN, LOW SPEECH, NEGATIVE/DISAPPROVING QUALITY
BREATHING: NORMAL
BODYLANGUAGE: TENSE, DISTRESSED PACING, FIDGETING
CONSOLABILITY: NO NEED TO CONSOLE
FACIALEXPRESSION: SMILING OR INEXPRESSIVE
TOTALSCORE: 0
BREATHING: NORMAL
FACIALEXPRESSION: FACIAL GRIMACING

## 2024-05-12 ASSESSMENT — PAIN - FUNCTIONAL ASSESSMENT
PAIN_FUNCTIONAL_ASSESSMENT: CPOT (CRITICAL CARE PAIN OBSERVATION TOOL)

## 2024-05-12 ASSESSMENT — COGNITIVE AND FUNCTIONAL STATUS - GENERAL
WALKING IN HOSPITAL ROOM: TOTAL
MOVING FROM LYING ON BACK TO SITTING ON SIDE OF FLAT BED WITH BEDRAILS: A LOT
TURNING FROM BACK TO SIDE WHILE IN FLAT BAD: TOTAL
MOBILITY SCORE: 7
MOVING TO AND FROM BED TO CHAIR: TOTAL
CLIMB 3 TO 5 STEPS WITH RAILING: TOTAL
STANDING UP FROM CHAIR USING ARMS: TOTAL

## 2024-05-12 NOTE — PROGRESS NOTES
"Vancomycin Dosing by Pharmacy- FOLLOW UP    Bing Holliday is a 63 y.o. year old female who Pharmacy has been consulted for vancomycin dosing for CNS/meningoencephalitis. Based on the patient's indication and renal status this patient is being dosed based on a goal AUC of 500-600.     Renal function is currently stable.    Current vancomycin dose: 1250 mg given every 24 hours    Estimated vancomycin AUC on current dose: 574 mg/L.hr     Visit Vitals  /74 (BP Location: Left arm, Patient Position: Lying)   Pulse 90   Temp 36.4 °C (97.5 °F) (Temporal)   Resp 15        Lab Results   Component Value Date    CREATININE 1.47 (H) 05/12/2024    CREATININE 1.53 (H) 05/11/2024    CREATININE 1.55 (H) 05/10/2024    CREATININE 1.70 (H) 05/09/2024        Patient weight is No results found for: \"PTWEIGHT\"    No results found for: \"CULTURE\"     I/O last 3 completed shifts:  In: 5286.7 (51.6 mL/kg) [I.V.:3686.7 (36 mL/kg); IV Piggyback:1600]  Out: 2650 (25.9 mL/kg) [Urine:2650 (0.7 mL/kg/hr)]  Weight: 102.4 kg   [unfilled]    Lab Results   Component Value Date    PATIENTTEMP  05/07/2024      Comment:      NOTE: Patient Results are Not Corrected for Temperature    PATIENTTEMP  05/07/2024      Comment:      NOTE: Patient Results are Not Corrected for Temperature    PATIENTTEMP  04/24/2024      Comment:      NOTE: Patient Results are Not Corrected for Temperature        Assessment/Plan    Within goal AUC range. Continue current vancomycin regimen.    This dosing regimen is predicted by [employer] to result in the following pharmacokinetic parameters:  <Regimen: 1250 mg IV every 24 hours.  Start time: 15:30 on 05/12/2024  Exposure target: AUC24 (range)400-600 mg/L.hr   AUC24,ss: 574 mg/L.hr  Probability of AUC24 > 400: 100 %  Ctrough,ss: 16.9 mg/L  Probability of Ctrough,ss > 20: 19 %  Probability of nephrotoxicity (Lodise YUDY 2009): 13 %  The next level will be obtained on 05/15/2024 at 0500. May be obtained sooner if " clinically indicated.   Will continue to monitor renal function daily while on vancomycin and order serum creatinine at least every 48 hours if not already ordered.  Follow for continued vancomycin needs, clinical response, and signs/symptoms of toxicity.       Gemini FarleyD

## 2024-05-12 NOTE — PROGRESS NOTES
"Bing Holliday is a 63 y.o. female on day 5 of admission presenting with Disorientation.      Subjective   Mental state much better today. Following commands and semi-alert though having hallucinations       Objective     Last Recorded Vitals  Blood pressure 172/83, pulse 56, temperature 35.8 °C (96.4 °F), temperature source Temporal, resp. rate 16, height 1.676 m (5' 6\"), weight 102 kg (225 lb 12 oz), SpO2 96%.    Physical Exam  Neurological Exam  Follows simple commands. She is reporting visual hallucinations or seeing people \"under my leg\". EOM full range  Asterixis is present  Strength is symmetric 4/5 throughout  Relevant Results                    [place] Coma Scale  Best Eye Response: Spontaneous  Best Verbal Response: Confused  Best Motor Response: Localizes pain  Nasim Coma Scale Score: 13                             Assessment/Plan      Principal Problem:    Disorientation  Impression:  Acute and recurrent encephalopathy - improving  Likely metabolic/endocrine related    Plan:   OK to stop CNS coverage  No further neurologic workup             Zuhair Bethea MD  "

## 2024-05-12 NOTE — PROGRESS NOTES
Vancomycin Dosing by Pharmacy- Cessation of Therapy    Consult to pharmacy for vancomycin dosing has been discontinued by the prescriber, pharmacy will sign off at this time.    Please call pharmacy if there are further questions or re-enter a consult if vancomycin is resumed.     Kiya Phillips, [facility]

## 2024-05-12 NOTE — PROGRESS NOTES
[facility] Progress Note               Rounding Cardiologist:  Antonio Rodriges MD, MD   Primary Cardiologist: Dr. Antonio Rodriges    Date:  5/12/2024  Patient:  Bing Holliday  YOB: 1960  MRN:  [id number]   Admit Date:  5/7/2024      SUBJECTIVE    Bing Holliday was seen and examined today at bedside.    Patient is more awake today  Patient went into atrial fibrillation from 1 AM until 7 AM.  Rates controlled.  Now back in sinus rhythm-sinus bradycardia with rates as low as 35 bpm    EKG performed today shows atrial fibrillation rate of 79 bpm QRS duration 102 ms QT corrected 424 ms.  VITALS     Vitals:    05/12/24 0645 05/12/24 0700 05/12/24 0800 05/12/24 0900   BP:  153/74 155/85 147/78   BP Location:  Left arm     Patient Position:  Lying     Pulse: 88 90 90 92   Resp: 16 15 21 16   Temp:       TempSrc:       SpO2: 93% 91% 93% 92%   Weight:       Height:           Intake/Output Summary (Last 24 hours) at 5/12/2024 1005  Last data filed at 5/12/2024 0643  Gross per 24 hour   Intake 3439.8 ml   Output 2025 ml   Net 1414.8 ml       [unfilled]    PHYSICAL EXAM   Vitals and nursing note reviewed.   Constitutional:       Appearance: She is ill-appearing.   HENT:      Head: Normocephalic and atraumatic.      Nose: Nose normal.      Mouth/Throat:      Mouth: Mucous membranes are dry.   Eyes:      Extraocular Movements: Extraocular movements intact.      Pupils: Pupils are equal, round, and reactive to light.   Cardiovascular:      Rate and Rhythm: Normal rate. Rhythm irregular.   Pulmonary:      Effort: Pulmonary effort is normal.      Breath sounds: Normal breath sounds.   Abdominal:      General: Abdomen is flat. There is no distension.      Tenderness: There is no abdominal tenderness.   Musculoskeletal:      Cervical back: Normal range of motion.      Right lower leg: No edema.      Left lower leg: No edema.   Skin:     General: Skin is warm and dry.      Capillary Refill: Capillary refill takes less  "than 2 seconds.   Neurological:      General: No focal deficit present.      Mental Status: She is alert. She is disoriented.             DIAGNOSTIC RESULTS   EKG:     Telemetry: Atrial fibrillation from 1 AM to 7 AM.  Now back in sinus rhythm-sinus bradycardia.      LAB DATA   BMP:  @LABRCNT(Na:3,K:3,CL:3,CO2:3,Bun:3,Creatinine:3,Glu:3, CA:3,LABGLOM)@    Cardiac Enzymes:  @LABRCNT(CKTOTAL:3,CKMB:3,CKMBINDEX:3,TROPONINI:3)@    CBC:   Lab Results   Component Value Date    WBC 7.6 05/12/2024    RBC 2.63 (L) 05/12/2024    HGB 7.9 (L) 05/12/2024    HCT 25.1 (L) 05/12/2024    PLT 99 (L) 05/12/2024       CMP:    Lab Results   Component Value Date     (H) 05/12/2024    K 4.0 05/12/2024     (H) 05/12/2024    CO2 29 05/12/2024    BUN 21 05/12/2024    CREATININE 1.47 (H) 05/12/2024    GLUCOSE 168 (H) 05/12/2024    CALCIUM 8.1 (L) 05/12/2024       Hepatic Function Panel:    Lab Results   Component Value Date    ALKPHOS 114 05/12/2024    ALT 17 05/12/2024    AST 17 05/12/2024    PROT 5.0 (L) 05/12/2024    BILITOT 0.3 05/12/2024       Magnesium:    Lab Results   Component Value Date    MG 1.96 05/12/2024       PT/INR:  No results found for: \"PROTIME\", \"INR\"  @LABRCNT(inr:3)@     HgBA1c:  No components found for: \"LABA1C\"    Lipid Profile:  No results found for: \"CHLPL\", \"TRIG\", \"HDL\", \"LDLCALC\", \"LDLDIRECT\"    TSH:  No results found for: \"TSH\"    ABG:  No results found for: \"PH\"    PRO-BNP: No results found for: \"PROBNP\"       RADIOLOGY     MR brain w and wo IV contrast   Final Result   No evidence of acute infarct, intracranial mass effect or midline   shift. Multiple chronic findings as detailed.        Signed by: Emelia George 5/11/2024 6:44 PM   Dictation workstation:   [id number]      XR chest 1 view   Final Result   1. Left IJ central line with the tip overlying the region of the   right atrium. No radiographic evidence for pneumothorax.   2. Radiopaque tubing at the soft tissues of the right side of the "   neck, may represent a peripherally inserted catheter. Clinical   correlation recommended.   3. Cardiomegaly. Vascular congestion. Bilateral airspace opacities,   may be secondary to pulmonary edema and/or pneumonia.                  MACRO:   None.        Signed by: Ceci Westbrook 5/10/2024 8:06 PM   Dictation workstation:   [id number]      Transthoracic Echo (TTE) Limited   Final Result      FL guided lumbar puncture   Final Result   Fluoroscopic guided lumbar puncture attempt. Only a very small volume   of slightly blood-tinged CSF was able to be obtained and then flow of   fluid stopped. The small volume of fluid obtained was submitted for   laboratory analysis.        MACRO:   None.        Signed by: Hay Tucker 5/10/2024 2:10 PM   Dictation workstation:   [id number]      CT head wo IV contrast   Final Result   No evidence of acute cortical infarct or intracranial hemorrhage.        Stable chronic changes.        MACRO:   None        Signed by: Higinio Sanchez 5/7/2024 5:43 PM   Dictation workstation:   [id number]      XR chest 1 view   Final Result   Diffuse interstitial infiltrates bilaterally, as above. Clinical   correlation and continued follow-up until clearing is recommended.        MACRO:   None.        Signed by: Nic Moseley 5/7/2024 2:07 PM   Dictation workstation:   [id number]          @[facility]@      CURRENT MEDICATIONS    acyclovir, 10 mg/kg (Ideal), intravenous, q8h  [Held by provider] apixaban, 2.5 mg, oral, q12h  [Held by provider] atorvastatin, 40 mg, oral, Daily  cefTRIAXone, 2 g, intravenous, q24h  [Held by provider] fluticasone-umeclidin-vilanter, 1 puff, inhalation, Daily  [Held by provider] folic acid, 1 mg, oral, Daily  heparin (porcine), 5,000 Units, subcutaneous, q8h  hydrocortisone sodium succinate, 100 mg, intravenous, q8h  insulin lispro, 0-10 Units, subcutaneous, q4h  ipratropium-albuteroL, 3 mL, nebulization, TID  [Held by provider] levETIRAcetam, 500 mg, oral,  BID  levETIRAcetam, 500 mg, intravenous, q12h  [Held by provider] melatonin, 5 mg, oral, Nightly  [Held by provider] multivitamin with minerals, 1 tablet, oral, Daily  [Held by provider] mycophenolate, 1,000 mg, oral, BID  OLANZapine zydis, 5 mg, oral, Nightly  pantoprazole, 40 mg, intravenous, Daily  [Held by provider] predniSONE, 15 mg, oral, Daily  [Held by provider] thiamine, 100 mg, oral, Daily  vancomycin, 1,250 mg, intravenous, q24h      lactated Ringer's, 100 mL/hr, Last Rate: 100 mL/hr (05/12/24 0810)          ASSESSMENT   Principal Problem:    Disorientation        Patient Active Problem List   Diagnosis    COVID-19    Lupus (Multi)    Ambulatory dysfunction    Myelodysplastic syndrome, unspecified (Multi)    Lupus vasculitis (Multi)    Hyperlipidemia    Pneumonia of both lower lobes due to infectious organism    Hallucinations    Leg swelling    Hyperglycemia    JED (acute kidney injury) (CMS-HCC)    Hypernatremia    Proliferative diabetic retinopathy of right eye without macular edema determined by examination associated with type 2 diabetes mellitus (Multi)    Urinary incontinence    Paraparesis (Multi)    Abdominal cramping    Abnormal echocardiogram    Abnormal gait    Abnormal thyroid blood test    Advanced COPD (Multi)    Afferent pupillary defect of right eye    Anemia    Pancytopenia (Multi)    Altitudinal scotoma of right eye    Arcuate scotoma of left eye    Arthropathy    Asymptomatic PVCs    PAF (paroxysmal atrial fibrillation) (Multi)    Balance problem    Bilateral high frequency sensorineural hearing loss    Bradycardia    Branch retinal artery occlusion    Cardiomyopathy (Multi)    Chronic diarrhea    Chronic fatigue    Chronic kidney disease (CKD), stage III (moderate) (Multi)    CKD stage G3a/A2, GFR 45-59 and albumin creatinine ratio  mg/g (Multi)    Combined forms of age-related cataract of left eye    Combined forms of age-related cataract of right eye    Contracture of hand     Snoring    CVA (cerebral vascular accident) (Multi)    Daytime somnolence    Degeneration of lumbar/lumbosacral disc without myelopathy    Depression, major    Dizziness    Dupuytren's contracture    Eczema    Elevated alkaline phosphatase level    Encephalopathy acute    Facial twitching    Fibromyalgia    Fungal nail infection    GERD (gastroesophageal reflux disease)    Gouty arthropathy    H/O iritis    H/O orthostatic hypotension    Hereditary elliptocytosis (CMS-HCC)    High level of cardiac marker    Hip hematoma, right    Hypothermia    Insomnia    Leg edema, left    Loss of consciousness (Multi)    Low blood sugar reading    Lung nodule, multiple    Lupus nephritis (Multi)    Metabolic encephalopathy    Muscle cramps    Nodule of skin of left lower leg    Obstructive lung disease (Multi)    Osteoarthritis of left hand    Osteoarthritis of right hand    Osteopenia    Osteoporosis    Palpitations    Peripheral visual field defect    Persistent proteinuria    Positive colorectal cancer screening using Cologuard test    Benign essential HTN    Psychosis (Multi)    Pulmonary hypertension (Multi)    Refractive error    Hypertensive retinopathy    Retinal neovascularization    Secondary pulmonary arterial hypertension (Multi)    Shortness of breath on exertion    Spinal stenosis of lumbar region with neurogenic claudication    Subjective tinnitus of both ears    Swelling of right foot    Syncope and collapse    Thrombophlebitis of superficial veins of left lower extremity    Tinnitus of left ear    Vitamin D deficiency    DM type 2 with diabetic peripheral neuropathy (Multi)    Systemic lupus erythematosus (Multi)    Diabetic nephropathy (Multi)    Functional diarrhea    UTI (urinary tract infection)    Moderate protein-calorie malnutrition (Multi)    Shock, unspecified (Multi)    Hyperkalemia    Adrenal insufficiency (Multi)    Seizure-like activity (Multi)    Meningitis (HHS-HCC)    Encephalopathy    Seizure  (Multi)    Uncontrolled blood glucose    Cervical spondylosis with myelopathy    COPD (chronic obstructive pulmonary disease) (Multi)    Encounter for diabetes education    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  Marked sinus bradycardia  2.  Hypertension  3.  Lupus  4.  Chronic kidney disease  5.  Anemia  6.  History of coronary artery disease with coronary artery bypass graft surgery in 2013  7.  History of atrial fibrillation on Eliquis therapy  8.  History of adrenal insufficiency  9.  Cardiomyopathy with a left ventricular ejection fraction n 40% per echocardiogram in March. 24     PLAN     Will continue with observation for now.  Ideally would like to add low-dose beta-blockers but patient also has significant bradycardia with rates as low as 35 bpm.  Ideally patient will benefit of a dual-chamber pacemaker but she is not stable for this procedure at this time.  Will continue with observation.    If she could have recurrence of atrial fibrillation she will benefit of going back on Eliquis therapy.    Continue telemetry    Antonio Rodriges MD      Please do not hesitate to call with questions.  Electronically signed by Antonio Rodriges MD, FACC on 5/12/2024 at 10:05 AM

## 2024-05-12 NOTE — PROGRESS NOTES
[facility] Critical Care Medicine       Date:  5/12/2024  Patient:  Bing Holliday  YOB: 1960  MRN:  [id number]   Admit Date:  5/7/2024  ========================================================================================================    Chief Complaint   Patient presents with    Altered Mental Status         History of Present Illness:  Bing Holliday is a 63 y.o. 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  History somewhat limited as the patient, while she awakens and is able to answer simple questions, is unable to offer any meaningful history.     Extensive chart review undertaken.  Patient with multiple presentations of the same.  Admitted from 1/17 - 1-28 to [facility] for similar presentation.  Underwent extensive workup there at that time, was treated with stress to steroids, rheumatology consultation who do not believe that she was in acute lupus flare, endocrinology consultation, as well as neurology consultation.  During that admission there was concern for potential seizure-like activity, the patient underwent a video EEG, was found to be in status, and was loaded with Keppra and continued on maintenance dosing.  She ultimately was discharged to skilled nursing facility for ongoing care.  At the time of discharge her noted prednisone dosing was 15 mg daily.     She was then readmitted to the hospital 3/9 through 3/14 with hypothermia, hyponatremia, altered mental status, and hypoglycemia.  She again underwent an extensive workup, was seen by endocrinology, manage on stress dose steroids, no infectious etiology elucidated  despite a very exhaustive search, she improved, and at that time she was discharged on what is documented as 20 mg of prednisone daily.     She then was readmitted to the hospital 3/19 through 3/26 with almost identical presentation of hypothermia, hyponatremia, hypoglycemia.  She was treated with stress dose steroids, underwent an MRI of the brain which was unrevealing, she returned to her baseline, and was discharged back to her skilled nursing facility.  At that time she was discharged home and is reported to be 20 mg of prednisone daily.     She then was readmitted to the hospital 4/24 to 4/30 for exactly the same presentation including hypoglycemia, hallucinations, hypothermia, and altered mental status.  And was treated in a very similar fashion including stress dose steroids.  She underwent an infectious workup, was treated for community-acquired pneumonia, and ultimately improved.  She was discharged with what was reported to be 20 mg of prednisone daily.     She presents today with hyperglycemia.  She received glucagon prior to arrival.  In the emergency department she was found to be leukopenic, hyponatremic, with a hemoglobin of 7.8 which is near her baseline hemoglobin.  She was given 1 g of ceftriaxone, 100 mg of hydrocortisone, and placed on a Allan hugger.  She was referred to the ICU for admission given her multiple medical comorbidities.       I was able to discuss case with the patient's skilled nursing facility, they note that she is getting 15 mg of prednisone daily. The recent discharge summary is from her prior admissions, the patient was to be discharged on 20 mg of prednisone as this is a dose that she has been well on.          Interval ICU Events:     5/7: Patient admitted to the ICU.  Seen and examined in the emergency department.  Somnolent but arouses to voice and follows simple commands.  Is without complaint.  Attempted to reach out to the patient's daughter who did not answer the  "phone.    5/8: No acute overnight events.  Patient remains easily arousable to voice but minimally interactive.  Hypothermia improving.  Hypoglycemia resolved.  Creatinine stable.  Urinalysis consistent with urinary tract infection.  Endocrinology consultation completed, agree with stress dose steroids.  EEG to be performed this morning.  Keppra level therapeutic.  Procalcitonin pending.    5/9: No acute overnight events.  Asymptomatic bradycardia noted on telemetry.  Patient becoming more responsive, agitated at times.  Discussed with son at bedside yesterday, notes that this is normal progression of her recovery when she is been hospitalized before.  No seizure activity reported on EEG.  Neurology consultation completed, recommending EEG, MRI when able.  Hypothermia improved, off Allan hugger.  Remains on stress dose steroids.  Urine culture finalized as negative.  Blood cultures prelim negative.  Urine antigens negative.  Anemic this morning with a hemoglobin of 6.9, 1 unit PRBCs ordered.    5/10: No acute overnight events.  Remains with asymptomatic bradycardia.  Electrophysiology saw the patient yesterday, may in the future require a pacemaker however no acute intervention as she is asymptomatic.  Mental status improving this morning, more alert and responsive to name, says \"good morning\" but does not answer other questions appropriately.  LP attempted at bedside yesterday without success, plan for potential IR guided LP today however given improving mental status with stress dose steroids low suspicion for bacterial meningitis/viral encephalitis remains low but given her immunocompromise state will need to rule out.    5/11: No acute overnight events.  Patient's mental status unchanged.  Awakens to voice, does not answer questions appropriately, however she appears that she is trying to say hospital when asked where she is.  LP completed yesterday, cultures pending.  Rapid meningitis panel negative.  All " "cultures have remained negative to date.  Bradycardia has resolved.  Glucose stable    5/12: Patient much improved this morning.  She is more awake and alert.  Able to state her name.  When told that she is was in the hospital she stated \"what is wrong with me\".  Knows her children's names.  Denies pain.  Remains without leukocytosis.  Hemoglobin stable.  Creatinine near baseline.  CSF so far negative.  Gram stain/culture currently pending and remains on empiric meningitis/encephalitis treatment.  In rate controlled A-fib on the monitor.  MRI completed yesterday without acute findings.    Medical History:  Past Medical History:   Diagnosis Date    Abnormal kidney function 04/04/2023    Acute headache 03/10/2024    Acute low back pain 10/30/2023    Acute upper respiratory infection, unspecified 03/04/2020    Acute URI    Acute upper respiratory infection, unspecified 09/30/2015    URTI (acute upper respiratory infection)    Arthritis     Atypical facial pain 03/10/2024    Body mass index (BMI) 23.0-23.9, adult 10/15/2021    BMI 23.0-23.9, adult    Body mass index (BMI) 33.0-33.9, adult 03/04/2020    BMI 33.0-33.9,adult    Cardiomegaly 08/27/2013    Left ventricular hypertrophy    Chest pain 06/10/2022    Comment on above: Added by Problem List Migration; 2013-3-12; Moved to Suppressed Nov 25 2013 9:16PM; Comment on above: Added by Problem List Migration; 2013-3-12; Moved to Suppressed Nov 25 2013 9:16PM;    Chronic kidney disease, stage 3 unspecified (Multi) 07/02/2013    Chronic kidney disease, stage III (moderate)    Confusional state 03/10/2024    Contact with and (suspected) exposure to covid-19 04/04/2023    Delirium 03/10/2024    Disease of pericardium, unspecified ([facility]) 07/02/2013    Pericardial disease    Encounter for follow-up examination after completed treatment for conditions other than malignant neoplasm 10/06/2022    Hospital discharge follow-up    Generalized contraction of visual field, right eye " 01/29/2015    Generalized contraction of visual field of right eye    Hearing loss 03/10/2024    History of cataract 03/10/2024    History of thrombocytopenia 03/10/2024    Comment on above: Added by Problem List Migration; 2013-7-2;    Homonymous bilateral field defects, right side 04/29/2016    Homonymous bilateral field defects of right side    Hypertensive chronic kidney disease with stage 1 through stage 4 chronic kidney disease, or unspecified chronic kidney disease 07/02/2013    Nephrosclerosis    Laceration without foreign body, left foot, initial encounter 07/03/2018    Foot laceration, left, initial encounter    Localized edema 03/10/2024    Mass of skin 03/10/2024    Migraine with aura, not intractable, without status migrainosus 10/24/2022    Ocular migraine    Open wound 03/10/2024    Open wound of left foot 03/10/2024    Other conditions influencing health status 07/02/2013    Chronic Glomerulonephritis In Diseases Classified Elsewhere    Other conditions influencing health status 07/02/2013    Progressive Familial Myoclonic Epilepsy    Other conditions influencing health status 07/02/2013    Protein S Deficiency    Other conditions influencing health status 05/22/2015    Familial Combined Hyperlipidemia    Other conditions influencing health status 10/24/2022    IDDM (insulin dependent diabetes mellitus)    Other conditions influencing health status 03/14/2022    Diabetes mellitus, insulin dependent (IDDM), uncontrolled    Other long term (current) drug therapy 10/24/2022    Long-term use of Plaquenil    Overweight with body mass index (BMI) 25.0-29.9 03/10/2024    Pain of toe 03/10/2024    Personal history of COVID-19 04/04/2023    Personal history of diseases of the blood and blood-forming organs and certain disorders involving the immune mechanism 07/02/2013    History of thrombocytopenia    Personal history of diseases of the skin and subcutaneous tissue 08/11/2015    History of foot ulcer     Personal history of nephrotic syndrome 07/02/2013    History of nephrotic syndrome    Personal history of other diseases of the circulatory system 08/27/2013    History of sinus tachycardia    Personal history of other diseases of the nervous system and sense organs     History of cataract    Personal history of other diseases of the respiratory system     History of bronchitis    Personal history of other infectious and parasitic diseases 07/02/2013    History of hepatitis    Personal history of other specified conditions     History of shortness of breath    Personal history of other specified conditions 08/27/2013    History of edema    Posterior epistaxis 03/10/2024    Puckering of macula, right eye 10/24/2022    ERM OD (epiretinal membrane, right eye)    Raynaud's syndrome without gangrene 07/02/2013    Raynaud's disease    Sinusitis 03/10/2024    Systemic lupus erythematosus, unspecified (Multi) 07/24/2015    SLE (systemic lupus erythematosus)    Systemic lupus erythematosus, unspecified (Multi) 07/24/2015    SLE (systemic lupus erythematosus)    Systemic lupus erythematosus, unspecified (Multi) 07/24/2015    Systemic lupus    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) 07/02/2013    Type 2 diabetes with nephropathy    Type 2 diabetes mellitus with mild nonproliferative diabetic retinopathy without macular edema, left eye (Multi) 07/27/2015    Non-proliferative diabetic retinopathy, left eye    Type 2 diabetes mellitus with mild nonproliferative diabetic retinopathy without macular edema, unspecified eye (Multi) 07/24/2015    Mild non proliferative diabetic retinopathy    Type 2 diabetes mellitus with proliferative diabetic retinopathy without macular edema, right eye (Multi) 07/27/2015    Proliferative diabetic retinopathy of right eye    Type 2 diabetes mellitus with proliferative diabetic retinopathy without macular edema, unspecified eye (Multi) 07/24/2015    Diabetic proliferative  retinopathy    Unspecified acute and subacute iridocyclitis 07/24/2015    Acute iritis, right eye    Unspecified open wound, left foot, sequela 07/03/2018    Wound, open, foot, left, sequela     Past Surgical History:   Procedure Laterality Date    ANKLE SURGERY  01/29/2015    Ankle Surgery    CHOLECYSTECTOMY  01/29/2015    Cholecystectomy    CT GUIDED PERCUTANEOUS BIOPSY BONE DEEP  5/4/2021    CT GUIDED PERCUTANEOUS BIOPSY BONE DEEP 5/4/2021 [facility] CLINICAL LEGACY    EYE SURGERY  03/06/2015    Eye Surgery    FOOT SURGERY  01/29/2015    Foot Surgery    MR HEAD ANGIO WO IV CONTRAST  7/26/2013    MR HEAD ANGIO WO IV CONTRAST 7/26/2013 [facility] CLINICAL LEGACY    MR HEAD ANGIO WO IV CONTRAST  9/17/2021    MR HEAD ANGIO WO IV CONTRAST 9/17/2021 [facility]GAC[facility]    MR HEAD ANGIO WO IV CONTRAST  3/25/2023    MR HEAD ANGIO WO IV CONTRAST [facility]    MR NECK ANGIO WO IV CONTRAST  7/26/2013    MR NECK ANGIO WO IV CONTRAST 7/26/2013 [facility] CLINICAL LEGACY    MR NECK ANGIO WO IV CONTRAST  9/17/2021    MR NECK ANGIO WO IV CONTRAST 9/17/2021 [facility]    MR NECK ANGIO WO IV CONTRAST  3/25/2023    MR NECK ANGIO WO IV CONTRAST [facility]    OTHER SURGICAL HISTORY  01/29/2015    Creation Of Pericardial Window    OTHER SURGICAL HISTORY  01/29/2015    Quadricepsplasty    TOTAL HIP ARTHROPLASTY  01/29/2015    Hip Replacement     Medications Prior to Admission   Medication Sig Dispense Refill Last Dose    apixaban (Eliquis) 2.5 mg tablet Take 1 tablet (2.5 mg) by mouth 2 times a day. 60 tablet 1 5/7/2024 at 0900    fluticasone-umeclidin-vilanter (TRELEGY-ELLIPTA) 200-62.5-25 mcg blister with device Inhale 1 puff once daily. 60 each 5 5/7/2024 at 0900    folic acid (Folvite) 1 mg tablet TAKE 1 TABLET BY MOUTH EVERY DAY 90 tablet 1 5/7/2024 at 0900    glucagon HCL (Glucagon, HCl, Emergency Kit) 1 mg recon soln Inject 1 mg into the muscle if needed.   5/7/2024 at 1225    guaiFENesin (Mucinex) 600 mg 12 hr tablet Take 2 tablets (1,200  mg) by mouth 2 times a day. Do not crush, chew, or split.   5/7/2024 at 0900    insulin glargine (Lantus U-100 Insulin) 100 unit/mL injection Inject 10 Units under the skin once daily in the morning. Take as directed per insulin instructions.   5/7/2024 at 0800    insulin lispro (HumaLOG) 100 unit/mL injection Inject under the skin 3 times a day as needed for high blood sugar (before meals). Take as directed per insulin Sliding Scale instructions.  0-150 = 0 units  151-200 = 2 units  201-250 = 4 units  251-300 = 6 units  301-350 = 8 units  351-400 = 10 units  >400 = give 10 units and contact MD   5/7/2024 at 0730- 8 UNITS GIVEN    levETIRAcetam (Keppra) 500 mg tablet Take 1 tablet (500 mg) by mouth every 12 hours. 60 tablet 0 5/7/2024 at 0900    magnesium oxide (Mag-Ox) 400 mg (241.3 mg magnesium) tablet Take 2 tablets (800 mg) by mouth once daily. Do not fill before May 1, 2024.   5/7/2024 at 0900    multivitamin with minerals tablet Take 1 tablet by mouth once daily.   5/7/2024 at 0900    mycophenolate (Cellcept) 500 mg tablet TAKE 2 TABLETS BY MOUTH TWICE A  tablet 0 5/7/2024 at 0900    nut.tx.gluc intol,lf,soy/fiber (BOOST GLUCOSE CONTROL ORAL) Take 8 fluid ounces by mouth once daily in the morning.   5/7/2024 at 0900    OLANZapine (ZyPREXA) 5 mg tablet Take 1 tablet (5 mg) by mouth 2 times a day.   5/7/2024 at 0900    pantoprazole (ProtoNix) 40 mg EC tablet TAKE 1 TABLET BY MOUTH EVERY DAY 90 tablet 1 5/7/2024 at 0800    predniSONE (Deltasone) 5 mg tablet Take 3 tablets (15 mg) by mouth once daily.   5/7/2024 at 0900    thiamine 100 mg tablet Take 1 tablet (100 mg) by mouth once daily.   5/7/2024 at 0900    acetaminophen (Tylenol) 325 mg tablet Take 2 tablets (650 mg) by mouth every 4 hours if needed for mild pain (1 - 3), moderate pain (4 - 6) or fever (temp greater than 38.0 C).   5/2/2024    acetaminophen (Tylenol) 500 mg tablet Take 2 tablets (1,000 mg) by mouth every 4 hours if needed for mild pain  "(1 - 3) or moderate pain (4 - 6).   Unknown    albuterol 90 mcg/actuation inhaler Inhale 2 puffs every 6 hours if needed for shortness of breath or wheezing.   Unknown    atorvastatin (Lipitor) 40 mg tablet Take 1 tablet (40 mg) by mouth once daily. (Patient taking differently: Take 1 tablet (40 mg) by mouth once daily at bedtime.) 90 tablet 3 5/6/2024 at 2100    balsam peru-castor oiL (Venelex) ointment Apply topically 2 times a day. APPLY TO BUTTOCKS, SACRUM, AND THIGHS.   5/6/2024 at PM    blood-glucose sensor (Dexcom G6 Sensor) device Use to check sugars 3 times daily 4 each 2     Dexcom G4 platinum  (Dexcom G6 ) misc Use as instructed 1 each 0     Dexcom G4 platinum transmitter (Dexcom G6 Transmitter) device Use as instructed 1 each 0     meclizine (Antivert) 25 mg tablet Take 1 tablet (25 mg) by mouth every 12 hours if needed for dizziness.   Unknown    melatonin 5 mg tablet Take 1 tablet (5 mg) by mouth once daily at bedtime.   5/6/2024 at 2100    pen needle, diabetic 31 gauge x 5/16\" needle Use to inject 1-4 times daily as directed. 100 each 11      Ace inhibitors, Hydroxychloroquine, Lisinopril, Penicillins, and Sulfa (sulfonamide antibiotics)  Social History     Tobacco Use    Smoking status: Never     Passive exposure: Never    Smokeless tobacco: Never   Vaping Use    Vaping status: Never Used   Substance Use Topics    Alcohol use: Not Currently     Comment: RARE    Drug use: Never     Family History   Problem Relation Name Age of Onset    Other (RENAL DISEASE) Mother          END STAGE    Other (CARDIAC DISORDER) Mother      Cataracts Mother      Stroke Mother      Diabetes Mother      Kidney disease Mother      Lupus Mother      Other (CARDIAC DISORDER) Father      COPD Father      Glaucoma Father      Hypertension Father      Sleep apnea Father      Other (CARDIAC DISORDER) Sister      Depression Sister      Kidney disease Sister      Sickle cell trait Sister      Sleep apnea Daughter   "       Review of Systems:  14 point review of systems was completed and negative except for those specially mention in my HPI    Physical Exam:    Heart Rate:  []   Temp:  [35.4 °C (95.7 °F)-36.4 °C (97.5 °F)]   Resp:  [11-48]   BP: (112-194)/()   SpO2:  [84 %-100 %]     Physical Exam  Vitals and nursing note reviewed.   Constitutional:       Appearance: She is ill-appearing.   HENT:      Head: Normocephalic and atraumatic.      Nose: Nose normal.      Mouth/Throat:      Mouth: Mucous membranes are dry.   Eyes:      Extraocular Movements: Extraocular movements intact.      Pupils: Pupils are equal, round, and reactive to light.   Cardiovascular:      Rate and Rhythm: Normal rate. Rhythm irregular.   Pulmonary:      Effort: Pulmonary effort is normal.      Breath sounds: Normal breath sounds.   Abdominal:      General: Abdomen is flat. There is no distension.      Tenderness: There is no abdominal tenderness.   Musculoskeletal:      Cervical back: Normal range of motion.      Right lower leg: No edema.      Left lower leg: No edema.   Skin:     General: Skin is warm and dry.      Capillary Refill: Capillary refill takes less than 2 seconds.   Neurological:      General: No focal deficit present.      Mental Status: She is alert. She is disoriented.         Objective:    I have reviewed all medications, laboratory results, and imaging pertinent for today's encounter    Assessment/Plan:    I am currently managing this critically ill patient for the following problems:    Acute encephalopathy: Multiple presentations of the same, believed to be due to adrenal insufficiency.    History of lupus cerebritis  History of seizure (believed to be focal seizure in nature based on chart review)  History of coronary artery disease  History of atrial fibrillation on Eliquis  Hx of heart failure with reduced EF not in acute exacerbation   History of GERD  Recurrent hypoglycemic episodes believed to be due to adrenal  insufficiency - possibly due to med rec error.   History of type 2 diabetes now with mild hyperglycemia   History of adrenal insufficiency  Chronic anemia  History of lupus  Immune suppressed     Neuro/Psych/Pain Ctrl/Sedation:  Tylenol available for pain control  Resume Zyprexa ODT at night (takes BID normally)  Continue Keppra given her history of seizure, level therapeutic   EEG neg for seizure   Neurology following  LP completed, cultures pending     Respiratory/ENT:  Maintain SpO2 greater than 90%, currently on room air  DuoNebs Q6  Home inhalers       Cardiovascular:  Maintain MAP greater than 65, not currently on any vasoactive agents  Hold Statin: Can resume when able to take p.o.  Has been unable to tolerate GDMT in the past due to allergies and other comorbidities  ASA given hx of CAD: Can resume when she is able to take p.o.  Okay to resume systemic anticoagulation (Eliquis) when taking p.o.  Bradycardia asymptomatic - observe for now   Echocardiogram with ejection fraction of 40 to 45%, unchanged from prior in May     GI:  NPO pending improvement in mental status: Swallow evaluation today  Home PPI     Renal/Volume Status (Intra & Extravascular):  Lytes/UOP Acceptable - no need for RRT  LR @ 100    Endocrine  Stress dose steroids 100mg Q8  ENDO following for steroid dosing   Should be on at least 20mg Prednisone NOT 15mg when acute process resolved      Infectious Disease:  DC Abx/Acyclovir - CSF studies have been negative      Heme/Onc:  Transfuse for symptomatic anemia, no current indication   Hold MMF: Can resume when taking p.o.     Ethics/Code Status:  Full Code     :  DVT Prophylaxis: SCDs/Heparin  GI Prophylaxis: PPI  Diet: NPO  CVC: NA  Joice: ANDREW  Montano: ANDREW  Restraints: NA  Dispo: Floor     I have personally spent 35 minutes of critical care time, exclusive of time spent on any procedures, in evaluation and management of this critically ill patient        Hay Mckeon DO

## 2024-05-13 ENCOUNTER — APPOINTMENT (OUTPATIENT)
Dept: RADIOLOGY | Facility: HOSPITAL | Age: 64
DRG: 981 | End: 2024-05-13
Payer: MEDICARE

## 2024-05-13 ENCOUNTER — APPOINTMENT (OUTPATIENT)
Dept: CARDIOLOGY | Facility: HOSPITAL | Age: 64
DRG: 981 | End: 2024-05-13
Payer: MEDICARE

## 2024-05-13 LAB
AMMONIA PLAS-SCNC: 26 UMOL/L (ref 16–53)
ANION GAP BLDV CALCULATED.4IONS-SCNC: 4 MMOL/L (ref 10–25)
ANION GAP SERPL CALC-SCNC: 8 MMOL/L (ref 10–20)
BASE EXCESS BLDV CALC-SCNC: 9.7 MMOL/L (ref -2–3)
BASO STIPL BLD QL SMEAR: PRESENT
BASOPHILS # BLD MANUAL: 0 X10*3/UL (ref 0–0.1)
BASOPHILS NFR BLD MANUAL: 0 %
BODY TEMPERATURE: ABNORMAL
BUN SERPL-MCNC: 22 MG/DL (ref 6–23)
BURR CELLS BLD QL SMEAR: ABNORMAL
CA-I BLDV-SCNC: 1.22 MMOL/L (ref 1.1–1.33)
CALCIUM SERPL-MCNC: 8 MG/DL (ref 8.6–10.3)
CHLORIDE BLDV-SCNC: 110 MMOL/L (ref 98–107)
CHLORIDE SERPL-SCNC: 111 MMOL/L (ref 98–107)
CO2 SERPL-SCNC: 33 MMOL/L (ref 21–32)
CREAT SERPL-MCNC: 1.61 MG/DL (ref 0.5–1.05)
EGFRCR SERPLBLD CKD-EPI 2021: 36 ML/MIN/1.73M*2
EOSINOPHIL # BLD MANUAL: 0 X10*3/UL (ref 0–0.7)
EOSINOPHIL NFR BLD MANUAL: 0 %
ERYTHROCYTE [DISTWIDTH] IN BLOOD BY AUTOMATED COUNT: 18.2 % (ref 11.5–14.5)
GLUCOSE BLD MANUAL STRIP-MCNC: 143 MG/DL (ref 74–99)
GLUCOSE BLD MANUAL STRIP-MCNC: 143 MG/DL (ref 74–99)
GLUCOSE BLD MANUAL STRIP-MCNC: 220 MG/DL (ref 74–99)
GLUCOSE BLD MANUAL STRIP-MCNC: 227 MG/DL (ref 74–99)
GLUCOSE BLD MANUAL STRIP-MCNC: 247 MG/DL (ref 74–99)
GLUCOSE BLD MANUAL STRIP-MCNC: 270 MG/DL (ref 74–99)
GLUCOSE BLDV-MCNC: 201 MG/DL (ref 74–99)
GLUCOSE SERPL-MCNC: 161 MG/DL (ref 74–99)
HCO3 BLDV-SCNC: 34.8 MMOL/L (ref 22–26)
HCT VFR BLD AUTO: 24.6 % (ref 36–46)
HCT VFR BLD EST: 25 % (ref 36–46)
HGB BLD-MCNC: 7.6 G/DL (ref 12–16)
HGB BLDV-MCNC: 8.3 G/DL (ref 12–16)
HOLD SPECIMEN: NORMAL
IMM GRANULOCYTES # BLD AUTO: 1.79 X10*3/UL (ref 0–0.7)
IMM GRANULOCYTES NFR BLD AUTO: 17.7 % (ref 0–0.9)
INHALED O2 CONCENTRATION: 21 %
LACTATE BLDV-SCNC: 0.8 MMOL/L (ref 0.4–2)
LYMPHOCYTES # BLD MANUAL: 0.81 X10*3/UL (ref 1.2–4.8)
LYMPHOCYTES NFR BLD MANUAL: 8 %
MAGNESIUM SERPL-MCNC: 2.02 MG/DL (ref 1.6–2.4)
MCH RBC QN AUTO: 30 PG (ref 26–34)
MCHC RBC AUTO-ENTMCNC: 30.9 G/DL (ref 32–36)
MCV RBC AUTO: 97 FL (ref 80–100)
METAMYELOCYTES # BLD MANUAL: 0.4 X10*3/UL
METAMYELOCYTES NFR BLD MANUAL: 4 %
MONOCYTES # BLD MANUAL: 0.51 X10*3/UL (ref 0.1–1)
MONOCYTES NFR BLD MANUAL: 5 %
MYELOCYTES # BLD MANUAL: 1.31 X10*3/UL
MYELOCYTES NFR BLD MANUAL: 13 %
NEUTROPHILS # BLD MANUAL: 7.07 X10*3/UL (ref 1.2–7.7)
NEUTS BAND # BLD MANUAL: 0.4 X10*3/UL (ref 0–0.7)
NEUTS BAND NFR BLD MANUAL: 4 %
NEUTS SEG # BLD MANUAL: 6.67 X10*3/UL (ref 1.2–7)
NEUTS SEG NFR BLD MANUAL: 66 %
NEUTS VAC BLD QL SMEAR: PRESENT
NRBC BLD-RTO: 1.9 /100 WBCS (ref 0–0)
OVALOCYTES BLD QL SMEAR: ABNORMAL
OXYHGB MFR BLDV: 41.9 % (ref 45–75)
PCO2 BLDV: 50 MM HG (ref 41–51)
PH BLDV: 7.45 PH (ref 7.33–7.43)
PHOSPHATE SERPL-MCNC: 2.4 MG/DL (ref 2.5–4.9)
PLATELET # BLD AUTO: 96 X10*3/UL (ref 150–450)
PO2 BLDV: 26 MM HG (ref 35–45)
POLYCHROMASIA BLD QL SMEAR: ABNORMAL
POTASSIUM BLDV-SCNC: 4.2 MMOL/L (ref 3.5–5.3)
POTASSIUM SERPL-SCNC: 3.8 MMOL/L (ref 3.5–5.3)
RBC # BLD AUTO: 2.53 X10*6/UL (ref 4–5.2)
RBC MORPH BLD: ABNORMAL
SAO2 % BLDV: 43 % (ref 45–75)
SODIUM BLDV-SCNC: 145 MMOL/L (ref 136–145)
SODIUM SERPL-SCNC: 148 MMOL/L (ref 136–145)
TOTAL CELLS COUNTED BLD: 100
TOXIC GRANULES BLD QL SMEAR: PRESENT
WBC # BLD AUTO: 10.1 X10*3/UL (ref 4.4–11.3)

## 2024-05-13 PROCEDURE — 2500000005 HC RX 250 GENERAL PHARMACY W/O HCPCS

## 2024-05-13 PROCEDURE — 99232 SBSQ HOSP IP/OBS MODERATE 35: CPT | Performed by: NURSE PRACTITIONER

## 2024-05-13 PROCEDURE — 37799 UNLISTED PX VASCULAR SURGERY: CPT

## 2024-05-13 PROCEDURE — 2500000006 HC RX 250 W HCPCS SELF ADMINISTERED DRUGS (ALT 637 FOR ALL PAYERS): Performed by: EMERGENCY MEDICINE

## 2024-05-13 PROCEDURE — 82140 ASSAY OF AMMONIA: CPT

## 2024-05-13 PROCEDURE — 2020000001 HC ICU ROOM DAILY

## 2024-05-13 PROCEDURE — 36410 VNPNXR 3YR/> PHY/QHP DX/THER: CPT

## 2024-05-13 PROCEDURE — 94640 AIRWAY INHALATION TREATMENT: CPT

## 2024-05-13 PROCEDURE — C9113 INJ PANTOPRAZOLE SODIUM, VIA: HCPCS | Performed by: EMERGENCY MEDICINE

## 2024-05-13 PROCEDURE — 85007 BL SMEAR W/DIFF WBC COUNT: CPT

## 2024-05-13 PROCEDURE — 84100 ASSAY OF PHOSPHORUS: CPT | Performed by: HOSPITALIST

## 2024-05-13 PROCEDURE — 83735 ASSAY OF MAGNESIUM: CPT

## 2024-05-13 PROCEDURE — 93010 ELECTROCARDIOGRAM REPORT: CPT | Performed by: INTERNAL MEDICINE

## 2024-05-13 PROCEDURE — 05H433Z INSERTION OF INFUSION DEVICE INTO LEFT INNOMINATE VEIN, PERCUTANEOUS APPROACH: ICD-10-PCS

## 2024-05-13 PROCEDURE — 85027 COMPLETE CBC AUTOMATED: CPT

## 2024-05-13 PROCEDURE — 82947 ASSAY GLUCOSE BLOOD QUANT: CPT

## 2024-05-13 PROCEDURE — 2500000002 HC RX 250 W HCPCS SELF ADMINISTERED DRUGS (ALT 637 FOR MEDICARE OP, ALT 636 FOR OP/ED): Performed by: EMERGENCY MEDICINE

## 2024-05-13 PROCEDURE — 80048 BASIC METABOLIC PNL TOTAL CA: CPT

## 2024-05-13 PROCEDURE — 82947 ASSAY GLUCOSE BLOOD QUANT: CPT | Mod: 91,MUE

## 2024-05-13 PROCEDURE — C1751 CATH, INF, PER/CENT/MIDLINE: HCPCS

## 2024-05-13 PROCEDURE — 2500000004 HC RX 250 GENERAL PHARMACY W/ HCPCS (ALT 636 FOR OP/ED): Performed by: HOSPITALIST

## 2024-05-13 PROCEDURE — 84132 ASSAY OF SERUM POTASSIUM: CPT | Mod: 91

## 2024-05-13 PROCEDURE — 2780000003 HC OR 278 NO HCPCS

## 2024-05-13 PROCEDURE — 2500000001 HC RX 250 WO HCPCS SELF ADMINISTERED DRUGS (ALT 637 FOR MEDICARE OP): Performed by: EMERGENCY MEDICINE

## 2024-05-13 PROCEDURE — 99291 CRITICAL CARE FIRST HOUR: CPT

## 2024-05-13 PROCEDURE — 93005 ELECTROCARDIOGRAM TRACING: CPT

## 2024-05-13 PROCEDURE — 2500000004 HC RX 250 GENERAL PHARMACY W/ HCPCS (ALT 636 FOR OP/ED): Performed by: EMERGENCY MEDICINE

## 2024-05-13 PROCEDURE — 2500000004 HC RX 250 GENERAL PHARMACY W/ HCPCS (ALT 636 FOR OP/ED): Performed by: STUDENT IN AN ORGANIZED HEALTH CARE EDUCATION/TRAINING PROGRAM

## 2024-05-13 PROCEDURE — 2500000004 HC RX 250 GENERAL PHARMACY W/ HCPCS (ALT 636 FOR OP/ED)

## 2024-05-13 PROCEDURE — 51701 INSERT BLADDER CATHETER: CPT

## 2024-05-13 RX ORDER — LIDOCAINE HYDROCHLORIDE 10 MG/ML
5 INJECTION INFILTRATION; PERINEURAL ONCE
Status: DISCONTINUED | OUTPATIENT
Start: 2024-05-13 | End: 2024-05-15

## 2024-05-13 RX ORDER — DEXTROSE MONOHYDRATE 50 MG/ML
75 INJECTION, SOLUTION INTRAVENOUS CONTINUOUS
Status: DISCONTINUED | OUTPATIENT
Start: 2024-05-13 | End: 2024-05-14

## 2024-05-13 RX ORDER — POTASSIUM CHLORIDE 20 MEQ/1
20 TABLET, EXTENDED RELEASE ORAL ONCE
Status: DISCONTINUED | OUTPATIENT
Start: 2024-05-13 | End: 2024-05-13

## 2024-05-13 RX ADMIN — IPRATROPIUM BROMIDE AND ALBUTEROL SULFATE 3 ML: 2.5; .5 SOLUTION RESPIRATORY (INHALATION) at 13:00

## 2024-05-13 RX ADMIN — POTASSIUM PHOSPHATE, MONOBASIC POTASSIUM PHOSPHATE, DIBASIC 21 MMOL: 224; 236 INJECTION, SOLUTION, CONCENTRATE INTRAVENOUS at 10:11

## 2024-05-13 RX ADMIN — INSULIN LISPRO 4 UNITS: 100 INJECTION, SOLUTION INTRAVENOUS; SUBCUTANEOUS at 01:47

## 2024-05-13 RX ADMIN — Medication 1 TABLET: at 08:42

## 2024-05-13 RX ADMIN — LEVETIRACETAM 500 MG: 5 INJECTION INTRAVENOUS at 08:42

## 2024-05-13 RX ADMIN — INSULIN LISPRO 4 UNITS: 100 INJECTION, SOLUTION INTRAVENOUS; SUBCUTANEOUS at 18:22

## 2024-05-13 RX ADMIN — HYDROCORTISONE SODIUM SUCCINATE 100 MG: 100 INJECTION, POWDER, FOR SOLUTION INTRAMUSCULAR; INTRAVENOUS at 08:42

## 2024-05-13 RX ADMIN — DEXTROSE MONOHYDRATE 75 ML/HR: 50 INJECTION, SOLUTION INTRAVENOUS at 10:31

## 2024-05-13 RX ADMIN — FOLIC ACID 1 MG: 1 TABLET ORAL at 10:11

## 2024-05-13 RX ADMIN — INSULIN LISPRO 6 UNITS: 100 INJECTION, SOLUTION INTRAVENOUS; SUBCUTANEOUS at 22:23

## 2024-05-13 RX ADMIN — Medication 5 MG: at 20:07

## 2024-05-13 RX ADMIN — HYDROCORTISONE SODIUM SUCCINATE 100 MG: 100 INJECTION, POWDER, FOR SOLUTION INTRAMUSCULAR; INTRAVENOUS at 23:51

## 2024-05-13 RX ADMIN — APIXABAN 2.5 MG: 5 TABLET, FILM COATED ORAL at 08:42

## 2024-05-13 RX ADMIN — IPRATROPIUM BROMIDE AND ALBUTEROL SULFATE 3 ML: 2.5; .5 SOLUTION RESPIRATORY (INHALATION) at 19:47

## 2024-05-13 RX ADMIN — LEVETIRACETAM 500 MG: 5 INJECTION INTRAVENOUS at 20:07

## 2024-05-13 RX ADMIN — PANTOPRAZOLE SODIUM 40 MG: 40 INJECTION, POWDER, FOR SOLUTION INTRAVENOUS at 08:42

## 2024-05-13 RX ADMIN — HYDROCORTISONE SODIUM SUCCINATE 100 MG: 100 INJECTION, POWDER, FOR SOLUTION INTRAMUSCULAR; INTRAVENOUS at 00:29

## 2024-05-13 RX ADMIN — HYDROCORTISONE SODIUM SUCCINATE 100 MG: 100 INJECTION, POWDER, FOR SOLUTION INTRAMUSCULAR; INTRAVENOUS at 15:20

## 2024-05-13 RX ADMIN — OLANZAPINE 5 MG: 10 TABLET, ORALLY DISINTEGRATING ORAL at 20:07

## 2024-05-13 RX ADMIN — IPRATROPIUM BROMIDE AND ALBUTEROL SULFATE 3 ML: 2.5; .5 SOLUTION RESPIRATORY (INHALATION) at 07:03

## 2024-05-13 RX ADMIN — HYDRALAZINE HYDROCHLORIDE 10 MG: 20 INJECTION INTRAMUSCULAR; INTRAVENOUS at 15:20

## 2024-05-13 RX ADMIN — INSULIN LISPRO 4 UNITS: 100 INJECTION, SOLUTION INTRAVENOUS; SUBCUTANEOUS at 15:03

## 2024-05-13 RX ADMIN — ACETAMINOPHEN 650 MG: 325 TABLET ORAL at 00:31

## 2024-05-13 RX ADMIN — ATORVASTATIN CALCIUM 40 MG: 20 TABLET, FILM COATED ORAL at 20:07

## 2024-05-13 RX ADMIN — SODIUM CHLORIDE, POTASSIUM CHLORIDE, SODIUM LACTATE AND CALCIUM CHLORIDE 100 ML/HR: 600; 310; 30; 20 INJECTION, SOLUTION INTRAVENOUS at 04:16

## 2024-05-13 RX ADMIN — Medication 100 MG: at 08:42

## 2024-05-13 RX ADMIN — APIXABAN 2.5 MG: 5 TABLET, FILM COATED ORAL at 20:07

## 2024-05-13 ASSESSMENT — PAIN - FUNCTIONAL ASSESSMENT
PAIN_FUNCTIONAL_ASSESSMENT: 0-10

## 2024-05-13 ASSESSMENT — PAIN SCALES - GENERAL
PAINLEVEL_OUTOF10: 0 - NO PAIN

## 2024-05-13 NOTE — PROGRESS NOTES
[facility] Critical Care Medicine       Date:  5/13/2024  Patient:  Bing Holliday  YOB: 1960  MRN:  [id number]   Admit Date:  5/7/2024  ========================================================================================================    Chief Complaint   Patient presents with    Altered Mental Status         History of Present Illness:  Bing Holliday is a 63 y.o. 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  History somewhat limited as the patient, while she awakens and is able to answer simple questions, is unable to offer any meaningful history.     Extensive chart review undertaken.  Patient with multiple presentations of the same.  Admitted from 1/17 - 1-28 to [facility] for similar presentation.  Underwent extensive workup there at that time, was treated with stress to steroids, rheumatology consultation who do not believe that she was in acute lupus flare, endocrinology consultation, as well as neurology consultation.  During that admission there was concern for potential seizure-like activity, the patient underwent a video EEG, was found to be in status, and was loaded with Keppra and continued on maintenance dosing.  She ultimately was discharged to skilled nursing facility for ongoing care.  At the time of discharge her noted prednisone dosing was 15 mg daily.     She was then readmitted to the hospital 3/9 through 3/14 with hypothermia, hyponatremia, altered mental status, and hypoglycemia.  She again underwent an extensive workup, was seen by endocrinology, manage on stress dose steroids, no infectious etiology elucidated  despite a very exhaustive search, she improved, and at that time she was discharged on what is documented as 20 mg of prednisone daily.     She then was readmitted to the hospital 3/19 through 3/26 with almost identical presentation of hypothermia, hyponatremia, hypoglycemia.  She was treated with stress dose steroids, underwent an MRI of the brain which was unrevealing, she returned to her baseline, and was discharged back to her skilled nursing facility.  At that time she was discharged home and is reported to be 20 mg of prednisone daily.     She then was readmitted to the hospital 4/24 to 4/30 for exactly the same presentation including hypoglycemia, hallucinations, hypothermia, and altered mental status.  And was treated in a very similar fashion including stress dose steroids.  She underwent an infectious workup, was treated for community-acquired pneumonia, and ultimately improved.  She was discharged with what was reported to be 20 mg of prednisone daily.     She presents today with hyperglycemia.  She received glucagon prior to arrival.  In the emergency department she was found to be leukopenic, hyponatremic, with a hemoglobin of 7.8 which is near her baseline hemoglobin.  She was given 1 g of ceftriaxone, 100 mg of hydrocortisone, and placed on a Allan hugger.  She was referred to the ICU for admission given her multiple medical comorbidities.       I was able to discuss case with the patient's skilled nursing facility, they note that she is getting 15 mg of prednisone daily. The recent discharge summary is from her prior admissions, the patient was to be discharged on 20 mg of prednisone as this is a dose that she has been well on.          Interval ICU Events:     5/7: Patient admitted to the ICU.  Seen and examined in the emergency department.  Somnolent but arouses to voice and follows simple commands.  Is without complaint.  Attempted to reach out to the patient's daughter who did not answer the  "phone.    5/8: No acute overnight events.  Patient remains easily arousable to voice but minimally interactive.  Hypothermia improving.  Hypoglycemia resolved.  Creatinine stable.  Urinalysis consistent with urinary tract infection.  Endocrinology consultation completed, agree with stress dose steroids.  EEG to be performed this morning.  Keppra level therapeutic.  Procalcitonin pending.    5/9: No acute overnight events.  Asymptomatic bradycardia noted on telemetry.  Patient becoming more responsive, agitated at times.  Discussed with son at bedside yesterday, notes that this is normal progression of her recovery when she is been hospitalized before.  No seizure activity reported on EEG.  Neurology consultation completed, recommending EEG, MRI when able.  Hypothermia improved, off Allan hugger.  Remains on stress dose steroids.  Urine culture finalized as negative.  Blood cultures prelim negative.  Urine antigens negative.  Anemic this morning with a hemoglobin of 6.9, 1 unit PRBCs ordered.    5/10: No acute overnight events.  Remains with asymptomatic bradycardia.  Electrophysiology saw the patient yesterday, may in the future require a pacemaker however no acute intervention as she is asymptomatic.  Mental status improving this morning, more alert and responsive to name, says \"good morning\" but does not answer other questions appropriately.  LP attempted at bedside yesterday without success, plan for potential IR guided LP today however given improving mental status with stress dose steroids low suspicion for bacterial meningitis/viral encephalitis remains low but given her immunocompromise state will need to rule out.    5/11: No acute overnight events.  Patient's mental status unchanged.  Awakens to voice, does not answer questions appropriately, however she appears that she is trying to say hospital when asked where she is.  LP completed yesterday, cultures pending.  Rapid meningitis panel negative.  All " "cultures have remained negative to date.  Bradycardia has resolved.  Glucose stable    5/12: Patient much improved this morning.  She is more awake and alert.  Able to state her name.  When told that she is was in the hospital she stated \"what is wrong with me\".  Knows her children's names.  Denies pain.  Remains without leukocytosis.  Hemoglobin stable.  Creatinine near baseline.  CSF so far negative.  Gram stain/culture currently pending and remains on empiric meningitis/encephalitis treatment.  In rate controlled A-fib on the monitor.  MRI completed yesterday without acute findings.    5/13: Pt is alert to self this am. Following minimal commands. Denies pain. Hemodynamically stable, converted to sinus angelica. On RA with SPO2 greater than 92%.  HGB 7.6. No leukocytosis.     Medical History:  Past Medical History:   Diagnosis Date    Abnormal kidney function 04/04/2023    Acute headache 03/10/2024    Acute low back pain 10/30/2023    Acute upper respiratory infection, unspecified 03/04/2020    Acute URI    Acute upper respiratory infection, unspecified 09/30/2015    URTI (acute upper respiratory infection)    Arthritis     Atypical facial pain 03/10/2024    Body mass index (BMI) 23.0-23.9, adult 10/15/2021    BMI 23.0-23.9, adult    Body mass index (BMI) 33.0-33.9, adult 03/04/2020    BMI 33.0-33.9,adult    Cardiomegaly 08/27/2013    Left ventricular hypertrophy    Chest pain 06/10/2022    Comment on above: Added by Problem List Migration; 2013-3-12; Moved to Suppressed Nov 25 2013 9:16PM; Comment on above: Added by Problem List Migration; 2013-3-12; Moved to Suppressed Nov 25 2013 9:16PM;    Chronic kidney disease, stage 3 unspecified (Multi) 07/02/2013    Chronic kidney disease, stage III (moderate)    Confusional state 03/10/2024    Contact with and (suspected) exposure to covid-19 04/04/2023    Delirium 03/10/2024    Disease of pericardium, unspecified ([facility]) 07/02/2013    Pericardial disease    Encounter " for follow-up examination after completed treatment for conditions other than malignant neoplasm 10/06/2022    Hospital discharge follow-up    Generalized contraction of visual field, right eye 01/29/2015    Generalized contraction of visual field of right eye    Hearing loss 03/10/2024    History of cataract 03/10/2024    History of thrombocytopenia 03/10/2024    Comment on above: Added by Problem List Migration; 2013-7-2;    Homonymous bilateral field defects, right side 04/29/2016    Homonymous bilateral field defects of right side    Hypertensive chronic kidney disease with stage 1 through stage 4 chronic kidney disease, or unspecified chronic kidney disease 07/02/2013    Nephrosclerosis    Laceration without foreign body, left foot, initial encounter 07/03/2018    Foot laceration, left, initial encounter    Localized edema 03/10/2024    Mass of skin 03/10/2024    Migraine with aura, not intractable, without status migrainosus 10/24/2022    Ocular migraine    Open wound 03/10/2024    Open wound of left foot 03/10/2024    Other conditions influencing health status 07/02/2013    Chronic Glomerulonephritis In Diseases Classified Elsewhere    Other conditions influencing health status 07/02/2013    Progressive Familial Myoclonic Epilepsy    Other conditions influencing health status 07/02/2013    Protein S Deficiency    Other conditions influencing health status 05/22/2015    Familial Combined Hyperlipidemia    Other conditions influencing health status 10/24/2022    IDDM (insulin dependent diabetes mellitus)    Other conditions influencing health status 03/14/2022    Diabetes mellitus, insulin dependent (IDDM), uncontrolled    Other long term (current) drug therapy 10/24/2022    Long-term use of Plaquenil    Overweight with body mass index (BMI) 25.0-29.9 03/10/2024    Pain of toe 03/10/2024    Personal history of COVID-19 04/04/2023    Personal history of diseases of the blood and blood-forming organs and certain  disorders involving the immune mechanism 07/02/2013    History of thrombocytopenia    Personal history of diseases of the skin and subcutaneous tissue 08/11/2015    History of foot ulcer    Personal history of nephrotic syndrome 07/02/2013    History of nephrotic syndrome    Personal history of other diseases of the circulatory system 08/27/2013    History of sinus tachycardia    Personal history of other diseases of the nervous system and sense organs     History of cataract    Personal history of other diseases of the respiratory system     History of bronchitis    Personal history of other infectious and parasitic diseases 07/02/2013    History of hepatitis    Personal history of other specified conditions     History of shortness of breath    Personal history of other specified conditions 08/27/2013    History of edema    Posterior epistaxis 03/10/2024    Puckering of macula, right eye 10/24/2022    ERM OD (epiretinal membrane, right eye)    Raynaud's syndrome without gangrene 07/02/2013    Raynaud's disease    Sinusitis 03/10/2024    Systemic lupus erythematosus, unspecified (Multi) 07/24/2015    SLE (systemic lupus erythematosus)    Systemic lupus erythematosus, unspecified (Multi) 07/24/2015    SLE (systemic lupus erythematosus)    Systemic lupus erythematosus, unspecified (Multi) 07/24/2015    Systemic lupus    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) 07/02/2013    Type 2 diabetes with nephropathy    Type 2 diabetes mellitus with mild nonproliferative diabetic retinopathy without macular edema, left eye (Multi) 07/27/2015    Non-proliferative diabetic retinopathy, left eye    Type 2 diabetes mellitus with mild nonproliferative diabetic retinopathy without macular edema, unspecified eye (Multi) 07/24/2015    Mild non proliferative diabetic retinopathy    Type 2 diabetes mellitus with proliferative diabetic retinopathy without macular edema, right eye (Multi) 07/27/2015    Proliferative  diabetic retinopathy of right eye    Type 2 diabetes mellitus with proliferative diabetic retinopathy without macular edema, unspecified eye (Multi) 07/24/2015    Diabetic proliferative retinopathy    Unspecified acute and subacute iridocyclitis 07/24/2015    Acute iritis, right eye    Unspecified open wound, left foot, sequela 07/03/2018    Wound, open, foot, left, sequela     Past Surgical History:   Procedure Laterality Date    ANKLE SURGERY  01/29/2015    Ankle Surgery    CHOLECYSTECTOMY  01/29/2015    Cholecystectomy    CT GUIDED PERCUTANEOUS BIOPSY BONE DEEP  5/4/2021    CT GUIDED PERCUTANEOUS BIOPSY BONE DEEP 5/4/2021 [facility] CLINICAL LEGACY    EYE SURGERY  03/06/2015    Eye Surgery    FOOT SURGERY  01/29/2015    Foot Surgery    MR HEAD ANGIO WO IV CONTRAST  7/26/2013    MR HEAD ANGIO WO IV CONTRAST 7/26/2013 [facility] CLINICAL LEGACY    MR HEAD ANGIO WO IV CONTRAST  9/17/2021    MR HEAD ANGIO WO IV CONTRAST 9/17/2021 [facility]    MR HEAD ANGIO WO IV CONTRAST  3/25/2023    MR HEAD ANGIO WO IV CONTRAST [facility]    MR NECK ANGIO WO IV CONTRAST  7/26/2013    MR NECK ANGIO WO IV CONTRAST 7/26/2013 [facility] CLINICAL LEGACY    MR NECK ANGIO WO IV CONTRAST  9/17/2021    MR NECK ANGIO WO IV CONTRAST 9/17/2021 [facility]    MR NECK ANGIO WO IV CONTRAST  3/25/2023    MR NECK ANGIO WO IV CONTRAST [facility]    OTHER SURGICAL HISTORY  01/29/2015    Creation Of Pericardial Window    OTHER SURGICAL HISTORY  01/29/2015    Quadricepsplasty    TOTAL HIP ARTHROPLASTY  01/29/2015    Hip Replacement     Medications Prior to Admission   Medication Sig Dispense Refill Last Dose    apixaban (Eliquis) 2.5 mg tablet Take 1 tablet (2.5 mg) by mouth 2 times a day. 60 tablet 1 5/7/2024 at 0900    fluticasone-umeclidin-vilanter (TRELEGY-ELLIPTA) 200-62.5-25 mcg blister with device Inhale 1 puff once daily. 60 each 5 5/7/2024 at 0900    folic acid (Folvite) 1 mg tablet TAKE 1 TABLET BY MOUTH EVERY DAY 90 tablet 1 5/7/2024 at 0900     glucagon HCL (Glucagon, HCl, Emergency Kit) 1 mg recon soln Inject 1 mg into the muscle if needed.   5/7/2024 at 1225    guaiFENesin (Mucinex) 600 mg 12 hr tablet Take 2 tablets (1,200 mg) by mouth 2 times a day. Do not crush, chew, or split.   5/7/2024 at 0900    insulin glargine (Lantus U-100 Insulin) 100 unit/mL injection Inject 10 Units under the skin once daily in the morning. Take as directed per insulin instructions.   5/7/2024 at 0800    insulin lispro (HumaLOG) 100 unit/mL injection Inject under the skin 3 times a day as needed for high blood sugar (before meals). Take as directed per insulin Sliding Scale instructions.  0-150 = 0 units  151-200 = 2 units  201-250 = 4 units  251-300 = 6 units  301-350 = 8 units  351-400 = 10 units  >400 = give 10 units and contact MD   5/7/2024 at 0730- 8 UNITS GIVEN    levETIRAcetam (Keppra) 500 mg tablet Take 1 tablet (500 mg) by mouth every 12 hours. 60 tablet 0 5/7/2024 at 0900    magnesium oxide (Mag-Ox) 400 mg (241.3 mg magnesium) tablet Take 2 tablets (800 mg) by mouth once daily. Do not fill before May 1, 2024.   5/7/2024 at 0900    multivitamin with minerals tablet Take 1 tablet by mouth once daily.   5/7/2024 at 0900    mycophenolate (Cellcept) 500 mg tablet TAKE 2 TABLETS BY MOUTH TWICE A  tablet 0 5/7/2024 at 0900    nut.tx.gluc intol,lf,soy/fiber (BOOST GLUCOSE CONTROL ORAL) Take 8 fluid ounces by mouth once daily in the morning.   5/7/2024 at 0900    OLANZapine (ZyPREXA) 5 mg tablet Take 1 tablet (5 mg) by mouth 2 times a day.   5/7/2024 at 0900    pantoprazole (ProtoNix) 40 mg EC tablet TAKE 1 TABLET BY MOUTH EVERY DAY 90 tablet 1 5/7/2024 at 0800    predniSONE (Deltasone) 5 mg tablet Take 3 tablets (15 mg) by mouth once daily.   5/7/2024 at 0900    thiamine 100 mg tablet Take 1 tablet (100 mg) by mouth once daily.   5/7/2024 at 0900    acetaminophen (Tylenol) 325 mg tablet Take 2 tablets (650 mg) by mouth every 4 hours if needed for mild pain (1 - 3),  "moderate pain (4 - 6) or fever (temp greater than 38.0 C).   5/2/2024    acetaminophen (Tylenol) 500 mg tablet Take 2 tablets (1,000 mg) by mouth every 4 hours if needed for mild pain (1 - 3) or moderate pain (4 - 6).   Unknown    albuterol 90 mcg/actuation inhaler Inhale 2 puffs every 6 hours if needed for shortness of breath or wheezing.   Unknown    atorvastatin (Lipitor) 40 mg tablet Take 1 tablet (40 mg) by mouth once daily. (Patient taking differently: Take 1 tablet (40 mg) by mouth once daily at bedtime.) 90 tablet 3 5/6/2024 at 2100    balsam peru-castor oiL (Venelex) ointment Apply topically 2 times a day. APPLY TO BUTTOCKS, SACRUM, AND THIGHS.   5/6/2024 at PM    blood-glucose sensor (Dexcom G6 Sensor) device Use to check sugars 3 times daily 4 each 2     Dexcom G4 platinum  (Dexcom G6 ) misc Use as instructed 1 each 0     Dexcom G4 platinum transmitter (Dexcom G6 Transmitter) device Use as instructed 1 each 0     meclizine (Antivert) 25 mg tablet Take 1 tablet (25 mg) by mouth every 12 hours if needed for dizziness.   Unknown    melatonin 5 mg tablet Take 1 tablet (5 mg) by mouth once daily at bedtime.   5/6/2024 at 2100    pen needle, diabetic 31 gauge x 5/16\" needle Use to inject 1-4 times daily as directed. 100 each 11      Ace inhibitors, Hydroxychloroquine, Lisinopril, Penicillins, and Sulfa (sulfonamide antibiotics)  Social History     Tobacco Use    Smoking status: Never     Passive exposure: Never    Smokeless tobacco: Never   Vaping Use    Vaping status: Never Used   Substance Use Topics    Alcohol use: Not Currently     Comment: RARE    Drug use: Never     Family History   Problem Relation Name Age of Onset    Other (RENAL DISEASE) Mother          END STAGE    Other (CARDIAC DISORDER) Mother      Cataracts Mother      Stroke Mother      Diabetes Mother      Kidney disease Mother      Lupus Mother      Other (CARDIAC DISORDER) Father      COPD Father      Glaucoma Father      " Hypertension Father      Sleep apnea Father      Other (CARDIAC DISORDER) Sister      Depression Sister      Kidney disease Sister      Sickle cell trait Sister      Sleep apnea Daughter         Review of Systems:  14 point review of systems was completed and negative except for those specially mention in my HPI    Physical Exam:    Heart Rate:  []   Temp:  [35.4 °C (95.7 °F)-37 °C (98.6 °F)]   Resp:  [10-34]   BP: (111-172)/()   SpO2:  [90 %-100 %]     Physical Exam  Vitals and nursing note reviewed.   Constitutional:       Appearance: She is ill-appearing.   HENT:      Head: Normocephalic and atraumatic.      Nose: Nose normal.      Mouth/Throat:      Mouth: Mucous membranes are dry.   Eyes:      Extraocular Movements: Extraocular movements intact.      Pupils: Pupils are equal, round, and reactive to light.   Cardiovascular:      Rate and Rhythm: Normal rate. Rhythm irregular.   Pulmonary:      Effort: Pulmonary effort is normal.      Breath sounds: Normal breath sounds.   Abdominal:      General: Abdomen is flat. There is no distension.      Tenderness: There is no abdominal tenderness.   Musculoskeletal:      Cervical back: Normal range of motion.      Right lower leg: No edema.      Left lower leg: No edema.   Skin:     General: Skin is warm and dry.      Capillary Refill: Capillary refill takes less than 2 seconds.   Neurological:      General: No focal deficit present.      Mental Status: She is alert. She is disoriented.         Objective:    ECG 12 Lead    Result Date: 5/13/2024  Sinus bradycardia Voltage criteria for left ventricular hypertrophy Abnormal ECG When compared with ECG of 12-MAY-2024 06:38, Sinus rhythm has replaced Atrial fibrillation Nonspecific T wave abnormality no longer evident in Lateral leads    ECG 12 Lead    Result Date: 5/12/2024  Atrial fibrillation Voltage criteria for left ventricular hypertrophy ST & T wave abnormality, consider inferior ischemia Abnormal ECG When  compared with ECG of 12-MAY-2024 01:59, (unconfirmed) No significant change was found Confirmed by Antonio Rodriges ([id number]) on 5/12/2024 10:06:57 AM    ECG 12 Lead    Result Date: 5/12/2024  Atrial fibrillation Moderate voltage criteria for LVH, may be normal variant Nonspecific ST and T wave abnormality Abnormal ECG When compared with ECG of 11-MAY-2024 07:08, Atrial fibrillation has replaced Sinus rhythm Non-specific change in ST segment in Lateral leads Confirmed by Antonio Rodriges ([id number]) on 5/12/2024 10:06:48 AM    MR brain w and wo IV contrast    Result Date: 5/11/2024  Interpreted By:  Emelia George, STUDY: MR BRAIN W AND WO IV CONTRAST; 5/11/2024 5:51 pm   INDICATION: Signs/Symptoms:seizure, change in MS.   COMPARISON: CT head from 05/07/2024   ACCESSION NUMBER(S): [id number]   ORDERING CLINICIAN: VINCENT LOPEZ   TECHNIQUE: Axial T2, FLAIR, DWI, gradient echo T2 and T1 weighted images of brain were acquired. Post contrast T1 weighted images were acquired after administration of  gadolinium based intravenous contrast.   FINDINGS: There is no diffusion restriction abnormality to suggest acute infarct.   There is an area of encephalomalacia within the left occipital lobe with patchy hemosiderin deposition.   There are patchy areas of T2 and FLAIR hyperintense signal within bilateral periventricular and subcortical white matter which likely reflect sequela of chronic small vessel ischemic change. Another small area of cortical encephalomalacia is noted within the parasagittal posterior right frontal lobe as well as within the left parietal lobe.   Minimal nonspecific white matter changes are noted within the ryan. 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   Postcontrast imaging demonstrates no focus of abnormal parenchymal or leptomeningeal enhancement.   Visualized paranasal sinuses are clear. Mild opacification of bilateral mastoid air cells is noted.       No evidence of acute  infarct, intracranial mass effect or midline shift. Multiple chronic findings as detailed.   Signed by: Emelia George 5/11/2024 6:44 PM Dictation workstation:   [id number]      Results for orders placed or performed during the hospital encounter of 05/07/24 (from the past 24 hour(s))   POCT GLUCOSE   Result Value Ref Range    POCT Glucose 143 (H) 74 - 99 mg/dL   POCT GLUCOSE   Result Value Ref Range    POCT Glucose 184 (H) 74 - 99 mg/dL   POCT GLUCOSE   Result Value Ref Range    POCT Glucose 173 (H) 74 - 99 mg/dL   POCT GLUCOSE   Result Value Ref Range    POCT Glucose 186 (H) 74 - 99 mg/dL   POCT GLUCOSE   Result Value Ref Range    POCT Glucose 220 (H) 74 - 99 mg/dL   Phosphorus   Result Value Ref Range    Phosphorus 2.4 (L) 2.5 - 4.9 mg/dL   CBC and Auto Differential   Result Value Ref Range    WBC 10.1 4.4 - 11.3 x10*3/uL    nRBC 1.9 (H) 0.0 - 0.0 /100 WBCs    RBC 2.53 (L) 4.00 - 5.20 x10*6/uL    Hemoglobin 7.6 (L) 12.0 - 16.0 g/dL    Hematocrit 24.6 (L) 36.0 - 46.0 %    MCV 97 80 - 100 fL    MCH 30.0 26.0 - 34.0 pg    MCHC 30.9 (L) 32.0 - 36.0 g/dL    RDW 18.2 (H) 11.5 - 14.5 %    Platelets 96 (L) 150 - 450 x10*3/uL    Immature Granulocytes %, Automated 17.7 (H) 0.0 - 0.9 %    Immature Granulocytes Absolute, Automated 1.79 (H) 0.00 - 0.70 x10*3/uL   Basic Metabolic Panel   Result Value Ref Range    Glucose 161 (H) 74 - 99 mg/dL    Sodium 148 (H) 136 - 145 mmol/L    Potassium 3.8 3.5 - 5.3 mmol/L    Chloride 111 (H) 98 - 107 mmol/L    Bicarbonate 33 (H) 21 - 32 mmol/L    Anion Gap 8 (L) 10 - 20 mmol/L    Urea Nitrogen 22 6 - 23 mg/dL    Creatinine 1.61 (H) 0.50 - 1.05 mg/dL    eGFR 36 (L) >60 mL/min/1.73m*2    Calcium 8.0 (L) 8.6 - 10.3 mg/dL   Magnesium   Result Value Ref Range    Magnesium 2.02 1.60 - 2.40 mg/dL   Manual Differential   Result Value Ref Range    Neutrophils %, Manual 66.0 40.0 - 80.0 %    Bands %, Manual 4.0 0.0 - 5.0 %    Lymphocytes %, Manual 8.0 13.0 - 44.0 %    Monocytes %, Manual 5.0 2.0 -  10.0 %    Eosinophils %, Manual 0.0 0.0 - 6.0 %    Basophils %, Manual 0.0 0.0 - 2.0 %    Metamyelocytes %, Manual 4.0 0.0 - 0.0 %    Myelocytes %, Manual 13.0 0.0 - 0.0 %    Seg Neutrophils Absolute, Manual 6.67 1.20 - 7.00 x10*3/uL    Bands Absolute, Manual 0.40 0.00 - 0.70 x10*3/uL    Lymphocytes Absolute, Manual 0.81 (L) 1.20 - 4.80 x10*3/uL    Monocytes Absolute, Manual 0.51 0.10 - 1.00 x10*3/uL    Eosinophils Absolute, Manual 0.00 0.00 - 0.70 x10*3/uL    Basophils Absolute, Manual 0.00 0.00 - 0.10 x10*3/uL    Metamyelocytes Absolute, Manual 0.40 0.00 - 0.00 x10*3/uL    Myelocytes Absolute, Manual 1.31 0.00 - 0.00 x10*3/uL    Total Cells Counted 100     Neutrophils Absolute, Manual 7.07 1.20 - 7.70 x10*3/uL    RBC Morphology See Below     Polychromasia Mild     Ovalocytes Few     Allen Cells Few     Basophilic Stippling Present     Toxic Granulation Present     Vacuolated Neutrophils Present    POCT GLUCOSE   Result Value Ref Range    POCT Glucose 143 (H) 74 - 99 mg/dL   ECG 12 Lead   Result Value Ref Range    Ventricular Rate 59 BPM    Atrial Rate 59 BPM    NJ Interval 150 ms    QRS Duration 96 ms    QT Interval 438 ms    QTC Calculation(Bazett) 433 ms    P Axis 54 degrees    R Axis -10 degrees    T Axis -40 degrees    QRS Count 10 beats    Q Onset 217 ms    P Onset 142 ms    P Offset 200 ms    T Offset 436 ms    QTC Fredericia 435 ms        Assessment/Plan:    I am currently managing this critically ill patient for the following problems:    Acute encephalopathy: Multiple presentations of the same, believed to be due to adrenal insufficiency.    History of lupus cerebritis  History of seizure (believed to be focal seizure in nature based on chart review)  History of coronary artery disease  History of atrial fibrillation on Eliquis  Hx of heart failure with reduced EF not in acute exacerbation   History of GERD  Recurrent hypoglycemic episodes believed to be due to adrenal insufficiency - possibly due to med  rec error.   History of type 2 diabetes now with mild hyperglycemia   History of adrenal insufficiency  Chronic anemia  History of lupus  Immune suppressed     Neuro/Psych/Pain Ctrl/Sedation:  Tylenol available for pain control  Resume Zyprexa ODT at night (takes BID normally)  Continue Keppra given her history of seizure, level therapeutic   EEG neg for seizure   Neurology following  LP completed, cultures pending, per neurology discontinue cns coverage      Respiratory/ENT:  Maintain SpO2 greater than 90%, currently on room air  DuoNebs Q6  Home inhalers       Cardiovascular:  Maintain MAP greater than 65, not currently on any vasoactive agents  Hold Statin: Can resume when able to take p.o.  Has been unable to tolerate GDMT in the past due to allergies and other comorbidities  ASA given hx of CAD: Can resume when she is able to take p.o.  Okay to resume systemic anticoagulation (Eliquis) when taking p.o.  Bradycardia asymptomatic - observe for now   Echocardiogram with ejection fraction of 40 to 45%, unchanged from prior in May     GI:  Diet order pending improvement in mental status: Swallow evaluation today  Home PPI     Renal/Volume Status (Intra & Extravascular):  Lytes/UOP Acceptable - no need for RRT  D5  half NS  @ 100    Endocrine  Stress dose steroids 100mg Q8  ENDO following for steroid dosing   Should be on at least 20mg Prednisone NOT 15mg when acute process resolved      Infectious Disease:  DC Abx/Acyclovir - CSF studies have been negative      Heme/Onc:  Transfuse for symptomatic anemia, no current indication   Resume eliquis      Ethics/Code Status:  Full Code     :  DVT Prophylaxis: SCDs/Heparin  GI Prophylaxis: PPI  Diet: per SLP   CVC: left internal jugular   Paton: NA  Montano: NA  Restraints: NA  Dispo: Floor     I have personally spent 35 minutes of critical care time, exclusive of time spent on any procedures, in evaluation and management of this critically ill  patient        Alla Arias, APRN-CNP

## 2024-05-13 NOTE — PROGRESS NOTES
"Electrophysiology Progress Note  Patient: Bing Holliday  Unit/Bed: 07/07-A  YOB: 1960  MRN: [id number]  Acct: [id number]   Admitting Diagnosis: Disorientation [R41.0]  Acute exacerbation of chronic obstructive pulmonary disease (COPD) (Multi) [J44.1]  Hypothermia, initial encounter [T68.XXXA]  Acute pneumonia [J18.9]  Date:  5/7/2024  Hospital Day: 6  Attending: Pranay Martinez MD   Rounding CRISTINA/Cardiologist:  Kiara Uriostegui APRN-CNP    Complaint:  Chief Complaint   Patient presents with    Altered Mental Status        SUBJECTIVE    Patient is very lethargic.  Can barely keep eyes open.  She is alert and oriented to self only.  Following minimal commands.  She denies c/o chest pain, SOB or palpitations.  Telemetry currently reveals she is back in SB/SR 38-60's.      VITALS   Visit Vitals  /70 (BP Location: Left arm, Patient Position: Lying)   Pulse (!) 44   Temp 35.9 °C (96.6 °F) (Temporal)   Resp 13      I/O:    Intake/Output Summary (Last 24 hours) at 5/13/2024 1135  Last data filed at 5/13/2024 0900  Gross per 24 hour   Intake 3908.9 ml   Output 2100 ml   Net 1808.9 ml      Allergies:  Allergies   Allergen Reactions    Ace Inhibitors Swelling, Angioedema, Shortness of breath and Other     'FACIAL TWISTING\" \"LOOKS LIKE I HAD A STROKE IN MY SLEEP\"    Hydroxychloroquine Other, Nausea And Vomiting, Nausea/vomiting and Unknown     RETINAL BLEEDING    RETINAL BLEEDING      RETINAL BLEEDING, Eye problems    Lisinopril Swelling    Penicillins Unknown     tolerates cephalosporins-unknown childhood allergy    Sulfa (Sulfonamide Antibiotics) Hives        PHYSICAL EXAM   Physical Exam  Vitals reviewed.   Constitutional:       Appearance: She is obese. She is ill-appearing.   HENT:      Head: Normocephalic and atraumatic.      Nose: Nose normal.      Mouth/Throat:      Mouth: Mucous membranes are dry.      Pharynx: Oropharynx is clear.   Eyes:      Pupils: Pupils are equal, round, and reactive " to light.   Cardiovascular:      Rate and Rhythm: Regular rhythm. Bradycardia present.      Pulses: Normal pulses.      Heart sounds: Normal heart sounds.   Pulmonary:      Effort: Pulmonary effort is normal.      Breath sounds: Normal breath sounds.   Abdominal:      General: Bowel sounds are normal.      Palpations: Abdomen is soft.   Musculoskeletal:         General: Normal range of motion.      Cervical back: Normal range of motion and neck supple.   Skin:     General: Skin is warm and dry.   Neurological:      General: No focal deficit present.      Mental Status: She is oriented to person, place, and time.   Psychiatric:         Mood and Affect: Mood normal.         Behavior: Behavior normal.       LABS   CBC:   Results from last 7 days   Lab Units 05/13/24  0340 05/12/24 0212 05/11/24  0445   WBC AUTO x10*3/uL 10.1 7.6 5.0   RBC AUTO x10*6/uL 2.53* 2.63* 2.56*   HEMOGLOBIN g/dL 7.6* 7.9* 7.6*   HEMATOCRIT % 24.6* 25.1* 24.5*   MCV fL 97 95 96   MCH pg 30.0 30.0 29.7   MCHC g/dL 30.9* 31.5* 31.0*   RDW % 18.2* 18.2* 18.5*   PLATELETS AUTO x10*3/uL 96* 99* 102*     CMP:    Results from last 7 days   Lab Units 05/13/24  0340 05/12/24 0212 05/11/24  0445 05/10/24  0450   SODIUM mmol/L 148* 146* 146* 146*   POTASSIUM mmol/L 3.8 4.0 4.4 5.2   CHLORIDE mmol/L 111* 110* 111* 114*   CO2 mmol/L 33* 29 29 23   BUN mg/dL 22 21 23 25*   CREATININE mg/dL 1.61* 1.47* 1.53* 1.55*   GLUCOSE mg/dL 161* 168* 129* 165*   PROTEIN TOTAL g/dL  --  5.0* 4.9* 5.4*   CALCIUM mg/dL 8.0* 8.1* 8.1* 8.5*   BILIRUBIN TOTAL mg/dL  --  0.3 0.2 0.3   ALK PHOS U/L  --  114 112 115   AST U/L  --  17 17 21   ALT U/L  --  17 18 18     Magnesium:  Results from last 7 days   Lab Units 05/13/24  0340 05/12/24  0212 05/11/24  0445   MAGNESIUM mg/dL 2.02 1.96 1.91     Troponin:    Results from last 7 days   Lab Units 05/07/24  1433   TROPHS ng/L 15*       Current Facility-Administered Medications:     acetaminophen (Tylenol) tablet 650 mg, 650 mg,  oral, q6h PRN, 650 mg at 05/13/24 0031 **OR** acetaminophen (Tylenol) suppository 650 mg, 650 mg, rectal, q6h PRN, Benja Angel, APRN-CNP, 650 mg at 05/12/24 0330    apixaban (Eliquis) tablet 2.5 mg, 2.5 mg, oral, q12h, Hay Mckeon DO, 2.5 mg at 05/13/24 0842    atorvastatin (Lipitor) tablet 40 mg, 40 mg, oral, Daily, Hay Mckeon DO, 40 mg at 05/12/24 2040    dextrose 5 % in water (D5W) infusion, 75 mL/hr, intravenous, Continuous, Pranay Martinez MD, Last Rate: 75 mL/hr at 05/13/24 1031, 75 mL/hr at 05/13/24 1031    dextrose 50 % injection 12.5 g, 12.5 g, intravenous, q15 min PRN, Lora Pérez, APRN-CNP    dextrose 50 % injection 25 g, 25 g, intravenous, q15 min PRN, Lora BEN Hendersonito, APRN-CNP    fluticasone-umeclidin-vilanter (TRELEGY-ELLIPTA) 200-62.5-25 mcg 200-62.5-25 mcg inhaler 1 puff, 1 puff, inhalation, Daily, Hay Mckeon DO    folic acid (Folvite) tablet 1 mg, 1 mg, oral, Daily, Hay Mckeon DO, 1 mg at 05/13/24 1011    glucagon (Glucagen) injection 1 mg, 1 mg, intramuscular, q15 min PRN, Lora Pérez, APRN-CNP    hydrALAZINE (Apresoline) injection 10 mg, 10 mg, intravenous, q4h PRN, Hay Mckeon DO    hydrocortisone sod succ (PF) (Solu-CORTEF) injection 100 mg, 100 mg, intravenous, q8h, Hay Mckeon DO, 100 mg at 05/13/24 0842    insulin lispro (HumaLOG) injection 0-10 Units, 0-10 Units, subcutaneous, q4h, Hay Mckeon DO, 4 Units at 05/13/24 0147    ipratropium-albuteroL (Duo-Neb) 0.5-2.5 mg/3 mL nebulizer solution 3 mL, 3 mL, nebulization, TID, Hay Mckeon DO, 3 mL at 05/13/24 0703    ipratropium-albuteroL (Duo-Neb) 0.5-2.5 mg/3 mL nebulizer solution 3 mL, 3 mL, nebulization, q2h PRN, Hay Mckoen DO    [Held by provider] levETIRAcetam (Keppra) tablet 500 mg, 500 mg, oral, BID, Hay Mckeon DO    levETIRAcetam in NaCl (iso-os) (Keppra)  mg, 500 mg, intravenous, q12h, Hay Mckeon DO, Stopped at 05/13/24 0857     melatonin tablet 5 mg, 5 mg, oral, Nightly, Hay HEENA Mckeon DO, 5 mg at 05/12/24 2040    multivitamin with minerals 1 tablet, 1 tablet, oral, Daily, Hay T MikeDO, 1 tablet at 05/13/24 0842    [Held by provider] mycophenolate (Cellcept) capsule 1,000 mg, 1,000 mg, oral, BID, Hay Mckeon DO    OLANZapine zydis (ZyPREXA) disintegrating tablet 5 mg, 5 mg, oral, Nightly, Hay Mckeon, DO, 5 mg at 05/12/24 2040    ondansetron (Zofran) injection 4 mg, 4 mg, intravenous, q6h PRN, Hay Mckeon DO    oxygen (O2) therapy, , inhalation, Continuous PRN - O2/gases, Hay Mckeon DO, 2 L/min at 05/12/24 1200    potassium phosphates 21 mmol in dextrose 5 % in water (D5W) 250 mL IV, 21 mmol, intravenous, Once, Alla Arias, APRN-CNP, Last Rate: 42.8 mL/hr at 05/13/24 1011, 21 mmol at 05/13/24 1011    [Held by provider] predniSONE (Deltasone) tablet 15 mg, 15 mg, oral, Daily, Hay Mckeon DO    thiamine (Vitamin B-1) tablet 100 mg, 100 mg, oral, Daily, Hay Mckeon DO, 100 mg at 05/13/24 0842    EEG  Result Date: 5/9/2024  IMPRESSION Impression This vEEG is indicative of moderate diffuse encephalopathy. No epileptiform discharges or lateralizing signs are recorded. Multiple non epileptic head clonic events occurred throughout the recording. A full report will be scanned into the patient's chart at a later time. This report has been interpreted and electronically signed by    ECG this AM: SB 59 and a Qtc of 433  Tele Monitoring: SB/SR 38 to 60s    Assessment/Plan:   1.  Marked sinus bradycardia  2.  Hypertension  3.  Lupus  4.  Chronic kidney disease  5.  Anemia  6.  History of coronary artery disease with coronary artery bypass graft surgery in 2013  7.  History of atrial fibrillation on Eliquis therapy  8.  History of adrenal insufficiency  9.  Cardiomyopathy with a left ventricular ejection fraction of 40% per echocardiogram in March/2024     Eventual outpatient DC PPM when patient  is more stable.  Will continue to observe on telemetry.  Patient will need to continue oral anticoagulation as she continues to have episodes of PAF.     Electronically signed by ASIM Rubio on 5/13/2024 at 11:35 AM

## 2024-05-13 NOTE — CARE PLAN
Problem: Skin  Goal: Decreased wound size/increased tissue granulation at next dressing change  5/13/2024 1318 by Rubia Mathis RN  Outcome: Progressing  Flowsheets (Taken 5/13/2024 1318)  Decreased wound size/increased tissue granulation at next dressing change: Protective dressings over bony prominences  5/13/2024 1316 by Rubia Mathis RN  Outcome: Progressing  Flowsheets (Taken 5/12/2024 2342 by Lilian Smith RN)  Decreased wound size/increased tissue granulation at next dressing change: Promote sleep for wound healing     Problem: Skin  Goal: Decreased wound size/increased tissue granulation at next dressing change  5/13/2024 1318 by Rubia Mathis RN  Outcome: Progressing  Flowsheets (Taken 5/13/2024 1318)  Decreased wound size/increased tissue granulation at next dressing change: Protective dressings over bony prominences  5/13/2024 1316 by Rubia Mathis RN  Outcome: Progressing  Flowsheets (Taken 5/12/2024 2342 by Lilian Smith RN)  Decreased wound size/increased tissue granulation at next dressing change: Promote sleep for wound healing  Goal: Participates in plan/prevention/treatment measures  5/13/2024 1318 by Rubia Mathis RN  Outcome: Progressing  Flowsheets (Taken 5/13/2024 1318)  Participates in plan/prevention/treatment measures: Elevate heels  5/13/2024 1316 by Rubia Mathis RN  Outcome: Progressing  Flowsheets (Taken 5/10/2024 1052 by Romelia Valenzuela RN)  Participates in plan/prevention/treatment measures:   Discuss with provider PT/OT consult   Elevate heels  Goal: Prevent/manage excess moisture  5/13/2024 1318 by Rubia Mathis RN  Outcome: Progressing  Flowsheets (Taken 5/13/2024 1318)  Prevent/manage excess moisture:   Moisturize dry skin   Monitor for/manage infection if present  5/13/2024 1316 by Rubia Mathis RN  Outcome: Progressing  Flowsheets (Taken 5/10/2024 1052 by Romelia Valenzuela RN)  Prevent/manage excess moisture:   Cleanse  incontinence/protect with barrier cream   Monitor for/manage infection if present  Goal: Prevent/minimize sheer/friction injuries  5/13/2024 1318 by Rubia Mathis RN  Outcome: Progressing  Flowsheets (Taken 5/13/2024 1318)  Prevent/minimize sheer/friction injuries:   HOB 30 degrees or less   Turn/reposition every 2 hours/use positioning/transfer devices  5/13/2024 1316 by Rubia Mathis RN  Outcome: Progressing  Flowsheets (Taken 5/10/2024 1052 by Romelia Valenzuela RN)  Prevent/minimize sheer/friction injuries:   Complete micro-shifts as needed if patient unable. Adjust patient position to relieve pressure points, not a full turn   Use pull sheet   HOB 30 degrees or less   Turn/reposition every 2 hours/use positioning/transfer devices  Goal: Promote/optimize nutrition  5/13/2024 1318 by Rubia Mathis RN  Outcome: Progressing  Flowsheets (Taken 5/13/2024 1318)  Promote/optimize nutrition: Monitor/record intake including meals  5/13/2024 1316 by Rubia Mathis RN  Outcome: Progressing  Flowsheets (Taken 5/10/2024 1052 by Romelia Valenzuela RN)  Promote/optimize nutrition:   Discuss with provider if NPO > 2 days   Reassess MST if dietician not consulted   Monitor/record intake including meals  Goal: Promote skin healing  5/13/2024 1318 by Rubia Mathis RN  Outcome: Progressing  Flowsheets (Taken 5/13/2024 1318)  Promote skin healing:   Assess skin/pad under line(s)/device(s)   Turn/reposition every 2 hours/use positioning/transfer devices  5/13/2024 1316 by Rubia Mathis RN  Outcome: Progressing  Flowsheets (Taken 5/10/2024 1052 by Romelia Valenzuela RN)  Promote skin healing:   Protective dressings over bony prominences   Turn/reposition every 2 hours/use positioning/transfer devices   Assess skin/pad under line(s)/device(s)   Ensure correct size (line/device) and apply per  instructions   Rotate device position/do not position patient on device

## 2024-05-13 NOTE — NURSING NOTE
Pt retaining 457 mL per bladder scan with complaints of pressure in pelvic area. Per Alla Arias CNP continue with straight cath. PT straight cathed for 450 mL of urine. New pure wick put in place, will continue to monitor pt.

## 2024-05-13 NOTE — PROGRESS NOTES
Endocrinology Progress Note  Patient: Bing Holliday  Unit/Bed: 07/07-A  YOB: 1960  MRN: [id number]  Acct: [id number]   Admitting Diagnosis: Disorientation [R41.0]  Acute exacerbation of chronic obstructive pulmonary disease (COPD) (Multi) [J44.1]  Hypothermia, initial encounter [T68.XXXA]  Acute pneumonia [J18.9]  Date:  5/7/2024  Hospital Day: 6  Attending: Pranay Martinez MD       Complaint:  Chief Complaint   Patient presents with    Altered Mental Status          Assessment     Patient Active Problem List   Diagnosis    COVID-19    Lupus (Multi)    Ambulatory dysfunction    Myelodysplastic syndrome, unspecified (Multi)    Lupus vasculitis (Multi)    Hyperlipidemia    Pneumonia of both lower lobes due to infectious organism    Hallucinations    Leg swelling    Hyperglycemia    JED (acute kidney injury) (CMS-[facility])    Hypernatremia    Proliferative diabetic retinopathy of right eye without macular edema determined by examination associated with type 2 diabetes mellitus (Multi)    Urinary incontinence    Paraparesis (Multi)    Abdominal cramping    Abnormal echocardiogram    Abnormal gait    Abnormal thyroid blood test    Advanced COPD (Multi)    Afferent pupillary defect of right eye    Anemia    Pancytopenia (Multi)    Altitudinal scotoma of right eye    Arcuate scotoma of left eye    Arthropathy    Asymptomatic PVCs    PAF (paroxysmal atrial fibrillation) (Multi)    Balance problem    Bilateral high frequency sensorineural hearing loss    Bradycardia    Branch retinal artery occlusion    Cardiomyopathy (Multi)    Chronic diarrhea    Chronic fatigue    Chronic kidney disease (CKD), stage III (moderate) (Multi)    CKD stage G3a/A2, GFR 45-59 and albumin creatinine ratio  mg/g (Multi)    Combined forms of age-related cataract of left eye    Combined forms of age-related cataract of right eye    Contracture of hand    Snoring    CVA (cerebral vascular accident) (Multi)    Daytime  somnolence    Degeneration of lumbar/lumbosacral disc without myelopathy    Depression, major    Dizziness    Dupuytren's contracture    Eczema    Elevated alkaline phosphatase level    Encephalopathy acute    Facial twitching    Fibromyalgia    Fungal nail infection    GERD (gastroesophageal reflux disease)    Gouty arthropathy    H/O iritis    H/O orthostatic hypotension    Hereditary elliptocytosis (CMS-HCC)    High level of cardiac marker    Hip hematoma, right    Hypothermia    Insomnia    Leg edema, left    Loss of consciousness (Multi)    Low blood sugar reading    Lung nodule, multiple    Lupus nephritis (Multi)    Metabolic encephalopathy    Muscle cramps    Nodule of skin of left lower leg    Obstructive lung disease (Multi)    Osteoarthritis of left hand    Osteoarthritis of right hand    Osteopenia    Osteoporosis    Palpitations    Peripheral visual field defect    Persistent proteinuria    Positive colorectal cancer screening using Cologuard test    Benign essential HTN    Psychosis (Multi)    Pulmonary hypertension (Multi)    Refractive error    Hypertensive retinopathy    Retinal neovascularization    Secondary pulmonary arterial hypertension (Multi)    Shortness of breath on exertion    Spinal stenosis of lumbar region with neurogenic claudication    Subjective tinnitus of both ears    Swelling of right foot    Syncope and collapse    Thrombophlebitis of superficial veins of left lower extremity    Tinnitus of left ear    Vitamin D deficiency    DM type 2 with diabetic peripheral neuropathy (Multi)    Systemic lupus erythematosus (Multi)    Diabetic nephropathy (Multi)    Functional diarrhea    UTI (urinary tract infection)    Moderate protein-calorie malnutrition (Multi)    Shock, unspecified (Multi)    Hyperkalemia    Adrenal insufficiency (Multi)    Seizure-like activity (Multi)    Meningitis ([facility]HS-HCC)    Encephalopathy    Seizure (Multi)    Uncontrolled blood glucose    Cervical spondylosis with  myelopathy    COPD (chronic obstructive pulmonary disease) (Multi)    Encounter for diabetes education    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)    Chronic kidney disease, stage 4 (severe) (Multi)    Ulcerative (chronic) pancolitis with rectal bleeding (Multi)    Anxiety    Cortical cataract    Hyperparathyroidism due to renal insufficiency (Multi)    Iron deficiency anemia due to chronic blood loss    Left ventricular hypertrophy    Nephrosclerosis    Obesity    Ocular migraine    Persistent cough    Proliferative diabetic retinopathy (Multi)    Raynaud's disease    Disorientation          Plan:  Adrenal insufficiency  Appears to be more stable now will reduce her hydrocortisone to 50 every 8 or out of the ICU attending desires.  Will also start her on Florinef 0.1 mg daily for hypertension in anticipation of switching to oral hydrocortisone in the next few days.        SUBJECTIVE    Patient seen this morning and had been improving she is eating some food although very confused  Vital reviews have been stable patient has been on hydrocodone 100 mg every 6 hours discussed with ICU attending surgeon        VITALS      Vitals:    05/13/24 1510 05/13/24 1600 05/13/24 1700 05/13/24 1806   BP: (!) 183/86 169/76 165/78 159/72   BP Location: Right arm Right arm Right arm Right arm   Patient Position: Lying Lying Lying Lying   Pulse: 52 60 74 70   Resp: 14 16 18 20   Temp:    36 °C (96.8 °F)   TempSrc:    Temporal   SpO2: 97% 96% 95% 96%   Weight:       Height:           Intake/Output Summary (Last 24 hours) at 5/13/2024 1819  Last data filed at 5/13/2024 1806  Gross per 24 hour   Intake 4256.65 ml   Output 2550 ml   Net 1706.65 ml      Wt Readings from Last 4 Encounters:   05/13/24 102 kg (224 lb 13.9 oz)   04/24/24 90.9 kg (200 lb 6.4 oz)   03/19/24 96.5 kg (212 lb 11.9 oz)   03/11/24 96.5 kg (212 lb 11.2 oz)        Allergies:  Allergies   Allergen Reactions    Ace Inhibitors  "Swelling, Angioedema, Shortness of breath and Other     'FACIAL TWISTING\" \"LOOKS LIKE I HAD A STROKE IN MY SLEEP\"    Hydroxychloroquine Other, Nausea And Vomiting, Nausea/vomiting and Unknown     RETINAL BLEEDING    RETINAL BLEEDING      RETINAL BLEEDING, Eye problems    Lisinopril Swelling    Penicillins Unknown     tolerates cephalosporins-unknown childhood allergy    Sulfa (Sulfonamide Antibiotics) Hives        PHYSICAL EXAM   Physical Exam  Awake alert lying in bed    LABS   Magnesium:  Results from last 7 days   Lab Units 05/13/24  0340 05/12/24  0212 05/11/24  0445   MAGNESIUM mg/dL 2.02 1.96 1.91     Lipid Panel:       Lab Review  Lab Results   Component Value Date    BILITOT 0.3 05/12/2024    CALCIUM 8.0 (L) 05/13/2024    CO2 33 (H) 05/13/2024     (H) 05/13/2024    CREATININE 1.61 (H) 05/13/2024    GLUCOSE 161 (H) 05/13/2024    ALKPHOS 114 05/12/2024    K 3.8 05/13/2024    PROT 5.0 (L) 05/12/2024     (H) 05/13/2024    AST 17 05/12/2024    ALT 17 05/12/2024    BUN 22 05/13/2024    ANIONGAP 8 (L) 05/13/2024    MG 2.02 05/13/2024    PHOS 2.4 (L) 05/13/2024    GGT 63 (H) 01/07/2021     04/25/2024    ALBUMIN 2.6 (L) 05/12/2024    LIPASE 30 01/24/2023    GFRF 30 (A) 09/01/2023    GFRMALE CANCELED 04/05/2023     Lab Results   Component Value Date    TRIG 79 03/24/2023    CHOL 160 03/24/2023    HDL 70.9 03/24/2023     Lab Results   Component Value Date    HGBA1C 8.4 (H) 04/24/2024    HGBA1C 8.0 (H) 02/08/2024    HGBA1C 7.1 (H) 01/24/2024     The ASCVD Risk score (Stone ANDRADE, et al., 2019) failed to calculate for the following reasons:    The patient has a prior MI or stroke diagnosis   Lab Results   Component Value Date    HGBA1C 8.4 (H) 04/24/2024    HGBA1C 8.0 (H) 02/08/2024    HGBA1C 7.1 (H) 01/24/2024     (H) 05/13/2024    K 3.8 05/13/2024     (H) 05/13/2024    CO2 33 (H) 05/13/2024    BUN 22 05/13/2024    CREATININE 1.61 (H) 05/13/2024    CALCIUM 8.0 (L) 05/13/2024    ALBUMIN 2.6 " (L) 05/12/2024    PROT 5.0 (L) 05/12/2024    BILITOT 0.3 05/12/2024    ALKPHOS 114 05/12/2024    ALT 17 05/12/2024    AST 17 05/12/2024    GLUCOSE 161 (H) 05/13/2024    CHOL 160 03/24/2023    TRIG 79 03/24/2023    HDL 70.9 03/24/2023    BHYDRXBUT 0.07 10/23/2023    CPEPTIDE 2.9 04/25/2024    INSAB <0.4 11/21/2023        apixaban, 2.5 mg, oral, q12h  atorvastatin, 40 mg, oral, Daily  fluticasone-umeclidin-vilanter, 1 puff, inhalation, Daily  folic acid, 1 mg, oral, Daily  hydrocortisone sodium succinate, 100 mg, intravenous, q8h  insulin lispro, 0-10 Units, subcutaneous, q4h  ipratropium-albuteroL, 3 mL, nebulization, TID  [Held by provider] levETIRAcetam, 500 mg, oral, BID  levETIRAcetam, 500 mg, intravenous, q12h  lidocaine, 5 mL, infiltration, Once  lidocaine, 5 mL, infiltration, Once  melatonin, 5 mg, oral, Nightly  multivitamin with minerals, 1 tablet, oral, Daily  [Held by provider] mycophenolate, 1,000 mg, oral, BID  OLANZapine zydis, 5 mg, oral, Nightly  [Held by provider] predniSONE, 15 mg, oral, Daily  thiamine, 100 mg, oral, Daily             Electronically signed by Mela Banuelos MD on 5/13/2024 at 6:19 PM

## 2024-05-13 NOTE — CARE PLAN
Problem: Pain  Goal: My pain/discomfort is manageable  Outcome: Progressing     Problem: Safety  Goal: Patient will be injury free during hospitalization  Outcome: Progressing  Goal: I will remain free of falls  Outcome: Progressing     Problem: Psychosocial Needs  Goal: Demonstrates ability to cope with hospitalization/illness  Outcome: Progressing  Goal: Collaborate with me, my family, and caregiver to identify my specific goals  Outcome: Progressing     Problem: Discharge Barriers  Goal: My discharge needs are met  Outcome: Progressing     Problem: Skin  Goal: Decreased wound size/increased tissue granulation at next dressing change  Outcome: Progressing  Flowsheets (Taken 5/12/2024 2342 by Lilian Smith RN)  Decreased wound size/increased tissue granulation at next dressing change: Promote sleep for wound healing  Goal: Participates in plan/prevention/treatment measures  Outcome: Progressing  Flowsheets (Taken 5/10/2024 1052 by Romelia Valenzuela RN)  Participates in plan/prevention/treatment measures:   Discuss with provider PT/OT consult   Elevate heels  Goal: Prevent/manage excess moisture  Outcome: Progressing  Flowsheets (Taken 5/10/2024 1052 by Romelia Valenzuela RN)  Prevent/manage excess moisture:   Cleanse incontinence/protect with barrier cream   Monitor for/manage infection if present  Goal: Prevent/minimize sheer/friction injuries  Outcome: Progressing  Flowsheets (Taken 5/10/2024 1052 by Romelia Valenzuela RN)  Prevent/minimize sheer/friction injuries:   Complete micro-shifts as needed if patient unable. Adjust patient position to relieve pressure points, not a full turn   Use pull sheet   HOB 30 degrees or less   Turn/reposition every 2 hours/use positioning/transfer devices  Goal: Promote/optimize nutrition  Outcome: Progressing  Flowsheets (Taken 5/10/2024 1052 by Romelia Valenzuela RN)  Promote/optimize nutrition:   Discuss with provider if NPO > 2 days   Reassess MST if dietician not consulted    Monitor/record intake including meals  Goal: Promote skin healing  Outcome: Progressing  Flowsheets (Taken 5/10/2024 1052 by Romelia Valenzuela RN)  Promote skin healing:   Protective dressings over bony prominences   Turn/reposition every 2 hours/use positioning/transfer devices   Assess skin/pad under line(s)/device(s)   Ensure correct size (line/device) and apply per  instructions   Rotate device position/do not position patient on device     Problem: Safety - Medical Restraint  Goal: Remains free of injury from restraints (Restraint for Interference with Medical Device)  Outcome: Progressing  Goal: Free from restraint(s) (Restraint for Interference with Medical Device)  Outcome: Progressing   The patient's goals for the shift include      The clinical goals for the shift include pt remaining hemodynamically stable during shift.

## 2024-05-13 NOTE — PROCEDURES
Pre-Procedure Checklist:  Emergent Line Insertion: No  Type of Line to be Placed: Midline  Consent Obtained: N/A  Emergency Medication Necessary: No  Patient Identified with 2 Independent Identifiers: Yes  Review of Allergies, Anticoagulation, Relevant Labs, ECG/Telemetry: Yes  Risks/Benefits/Alternatives Discussed with Patient/POA/Legal Representative: Yes  Stop Sign on Door: Yes  Time Out Performed: Yes  Catheter Exchange: No    Positioning Checklist:  All People, Including Patient, in the Room with Cap and Mask: Yes  Fluoroscopy Used to Identify Vessel and Guide Insertion: No   Sterile Cover Used: Yes  Full Barrier Precautions Followed (Mask, Cap, Gown, Gloves): Yes  Hands Washed: Yes  Monitors Attached with Sound Alarms On: No  Full Body Sterile Drape (Head-to-Toe) Used to Cover Patient: Yes  Trendelenburg Position (For IJ and Subclavian): No  CHG Skin Prep Used and Allowed to Air Dry to Skin Procedure: Yes    Procedure Checklist:  Blood Aspirated From All Lumens, All Ports Subsequently Flushed: Yes  Catheter Caps Placed on All Lumens; Lumens Clamped: Yes  Maintain Guidewire Control Throughout, Ensuring Guidewire Removal: Yes  Maintain Sterile Field Throughout Insertion: Yes  Catheter Secured: Yes  Confirmatory Test of Venous Placement: Non-Pulsatile Blood    Post Procedure Checklist:  Date and Time Written on Dressing: Yes  Sharp and Wire Count and Safe Disposal of all Sharps/Wires: Yes  Sterile Dressing Applied Per Protocol: Yes  X-ray Ordered or ECG Image: N/A    Midline Insertion Details:  Midline expires in 28 days date 6/10/24  See LDA assessment for further midline details.  Additional Details: Line was inserted using Modified Seldinger's Technique.   Midline ready for immediate use.  Placed by: Poonam Bond RN

## 2024-05-13 NOTE — CONSULTS
"Nutrition Initial Assessment:   Nutrition Assessment    Reason for Assessment: Admission nursing screening    Patient is a 63 y.o. female presenting with disorientation.   Pt with past medical history of lupus, CKD stage 3, COPD, GERD, CAD, Type 2 diabetes.       Nutrition History:  Energy Intake: Fair 50-75 % (63% average x 2 documented meals)  Food and Nutrient History: RDN consult for MST score of 2. Attempted to meet with pt multiple times, pt either sleeping or nursing providing care, unsure how much history pt would be able to provide. Chart reviewed. Diet recently advanced, previously NPO due to altered mental status. Currently on Regular diet - recommend continue with liberalized diet to help promote meal intakes. Will add Glucerna Shakes with lunch and dinner - pt received during previous admission. Will continue to monitor.  Food Allergies/Intolerances:  None  GI Symptoms: None  Oral Problems: None and SLP following       Anthropometrics:  Height: 167.6 cm (5' 6\")   Weight: 102 kg (224 lb 13.9 oz) (wt from 5/10/24)   BMI (Calculated): 36.31  IBW/kg (Dietitian Calculated): 59.1 kg          Weight History:   Wt Readings from Last 10 Encounters:   05/13/24 102 kg (224 lb 13.9 oz)   04/24/24 90.9 kg (200 lb 6.4 oz)   03/19/24 96.5 kg (212 lb 11.9 oz)   03/11/24 96.5 kg (212 lb 11.2 oz)   02/14/24 86.2 kg (190 lb)   02/08/24 87.5 kg (193 lb)   01/20/24 99.3 kg (218 lb 14.7 oz)   01/17/24 96 kg (211 lb 10.3 oz)   12/26/23 93 kg (205 lb)   12/18/23 93.4 kg (205 lb 14.6 oz)      Weight Change %:  Weight History / % Weight Change: no significant wt changes per chart review  Significant Weight Loss: No    Nutrition Focused Physical Exam Findings:  defer: due to pt sleeping and/or receiving care - will attempt at follow up visit.  Subcutaneous Fat Loss:      Muscle Wasting:     Edema:  Edema: none  Physical Findings:  Skin: Positive (wound noted to lower back, stage 2 PI right buttock)    Nutrition Significant " Labs:  BMP Trend:   Results from last 7 days   Lab Units 05/13/24  0340 05/12/24  0212 05/11/24  0445 05/10/24  0450   GLUCOSE mg/dL 161* 168* 129* 165*   CALCIUM mg/dL 8.0* 8.1* 8.1* 8.5*   SODIUM mmol/L 148* 146* 146* 146*   POTASSIUM mmol/L 3.8 4.0 4.4 5.2   CO2 mmol/L 33* 29 29 23   CHLORIDE mmol/L 111* 110* 111* 114*   BUN mg/dL 22 21 23 25*   CREATININE mg/dL 1.61* 1.47* 1.53* 1.55*    , A1C:  Lab Results   Component Value Date    HGBA1C 8.4 (H) 04/24/2024   , BG POCT trend:   Results from last 7 days   Lab Units 05/13/24  1408 05/13/24  1008 05/13/24  0547 05/13/24  0145 05/12/24  2139   POCT GLUCOSE mg/dL 227* 143* 143* 220* 186*    , Renal Lab Trend:   Results from last 7 days   Lab Units 05/13/24  0340 05/12/24  0212 05/11/24  0445 05/10/24  0450 05/10/24  0450   POTASSIUM mmol/L 3.8 4.0 4.4  --  5.2   PHOSPHORUS mg/dL 2.4* 3.3 3.3   < >  --    SODIUM mmol/L 148* 146* 146*  --  146*   MAGNESIUM mg/dL 2.02 1.96 1.91  --  1.88   EGFR mL/min/1.73m*2 36* 40* 38*  --  38*   BUN mg/dL 22 21 23  --  25*   CREATININE mg/dL 1.61* 1.47* 1.53*  --  1.55*    < > = values in this interval not displayed.        Nutrition Specific Medications:  Reviewed - Lipitor, folvite, insulin, MVI, melatonin, protonix, K-phos, thiamine, LR at 100 ml/hr    I/O:   Last BM Date: 05/13/24; Stool Appearance: Loose (05/13/24 0400)    Dietary Orders (From admission, onward)       Start     Ordered    05/12/24 1042  Adult diet Regular  Diet effective now        Question:  Diet type  Answer:  Regular    05/12/24 1041                     Estimated Needs:   Total Energy Estimated Needs (kCal): 2040 kCal  Method for Estimating Needs: 20 kcal/kg ABW  Total Protein Estimated Needs (g): 61 g  Method for Estimating Needs: 61-82 g (0.6-0.8 g/kg ABW) or as renal function allows  Total Fluid Estimated Needs (mL): 2040 mL  Method for Estimating Needs: 1 ml/kcal or per MD        Nutrition Diagnosis   Malnutrition Diagnosis  Patient has Malnutrition  Diagnosis: No    Nutrition Diagnosis  Patient has Nutrition Diagnosis: Yes  Diagnosis Status (1): New  Nutrition Diagnosis 1: Inadequate oral intake  Related to (1): altered mental status  As Evidenced by (1): NPO at time of admission, recent diet advancement, <75% intake at meals       Nutrition Interventions/Recommendations         Nutrition Prescription:  Individualized Nutrition Prescription Provided for : Regular diet with ONS. Monitor blood glucose and need for carb restriction        Nutrition Interventions:   Interventions: Meals and snacks, Medical food supplement  Meals and Snacks: General healthful diet  Goal: Consumes 3 meals per day  Medical Food Supplement: Commercial beverage  Goal: Glucerna BID (provides 220 kcal, 10 g protein per serving).    Collaboration and Referral of Nutrition Care: Collaboration by nutrition professional with other providers  Goal: IDT rounds    Nutrition Education:   Not appropriate at this time       Nutrition Monitoring and Evaluation   Food/Nutrient Related History Monitoring  Monitoring and Evaluation Plan: Energy intake, Amount of food, Fluid intake  Energy Intake: Estimated energy intake  Criteria: Pt meets >75% of estimated energy needs  Fluid Intake: Estimated fluid intake  Criteria: Meets >75% of estimated fluid needs  Amount of Food: Estimated amout of food, Medical food intake  Criteria: Pt consumes >75% of meals and supplements    Body Composition/Growth/Weight History  Monitoring and Evaluation Plan: Weight  Weight: Measured weight  Criteria: Maintains stable weight    Biochemical Data, Medical Tests and Procedures  Monitoring and Evaluation Plan: Glucose/endocrine profile, Electrolyte/renal panel  Electrolyte and Renal Panel: Sodium, Potassium, Phosphorus  Criteria: Electrolytes WNL  Glucose/Endocrine Profile: Glucose, casual  Criteria: BG within desirable range            Time Spent/Follow-up Reminder:   Time Spent (min): 40 minutes  Last Date of Nutrition  Visit: 05/13/24  Nutrition Follow-Up Needed?: 3-5 days  Follow up Comment: sandee BID

## 2024-05-13 NOTE — PROGRESS NOTES
Alla Arias, APRN-CNP has requested midline d/t many failed US IV attempts and patient losing IV sites.  They also want to removed the CVC line in patient's neck.

## 2024-05-13 NOTE — PROGRESS NOTES
Speech-Language Pathology                 Therapy Communication Note    Patient Name: Bing Holliday  MRN: [id number]  Today's Date: 5/13/2024     Discipline: Speech Language Pathology    Missed Visit Reason:  Order for speech/lang cog evaluation received and reviewed. Attempted evaluation this date however pt too lethargic. Pt would wake for brief moments then fall back asleep not answering any questions.   Per neuro note- pt. with acute and recurrent encephalopathy. Pt. Has multiple admissions with same and once resolved her mentation improves. Again suspect mentation will improve as metabolic encephalopathy improves. Speech therapy to re attempt at later time as pt appropriate     Missed Time: Attempt

## 2024-05-14 ENCOUNTER — APPOINTMENT (OUTPATIENT)
Dept: CARDIOLOGY | Facility: HOSPITAL | Age: 64
DRG: 981 | End: 2024-05-14
Payer: MEDICARE

## 2024-05-14 LAB
ANION GAP SERPL CALC-SCNC: 10 MMOL/L (ref 10–20)
ANION GAP SERPL CALC-SCNC: 9 MMOL/L (ref 10–20)
BASOPHILS # BLD MANUAL: 0.13 X10*3/UL (ref 0–0.1)
BASOPHILS NFR BLD MANUAL: 1 %
BUN SERPL-MCNC: 22 MG/DL (ref 6–23)
BUN SERPL-MCNC: 23 MG/DL (ref 6–23)
BURR CELLS BLD QL SMEAR: ABNORMAL
CALCIUM SERPL-MCNC: 8.1 MG/DL (ref 8.6–10.3)
CALCIUM SERPL-MCNC: 8.1 MG/DL (ref 8.6–10.3)
CHLORIDE SERPL-SCNC: 109 MMOL/L (ref 98–107)
CHLORIDE SERPL-SCNC: 110 MMOL/L (ref 98–107)
CO2 SERPL-SCNC: 32 MMOL/L (ref 21–32)
CO2 SERPL-SCNC: 34 MMOL/L (ref 21–32)
CREAT SERPL-MCNC: 1.66 MG/DL (ref 0.5–1.05)
CREAT SERPL-MCNC: 1.85 MG/DL (ref 0.5–1.05)
EGFRCR SERPLBLD CKD-EPI 2021: 31 ML/MIN/1.73M*2
EGFRCR SERPLBLD CKD-EPI 2021: 35 ML/MIN/1.73M*2
EOSINOPHIL # BLD MANUAL: 0 X10*3/UL (ref 0–0.7)
EOSINOPHIL NFR BLD MANUAL: 0 %
ERYTHROCYTE [DISTWIDTH] IN BLOOD BY AUTOMATED COUNT: 18 % (ref 11.5–14.5)
GLUCOSE BLD MANUAL STRIP-MCNC: 179 MG/DL (ref 74–99)
GLUCOSE BLD MANUAL STRIP-MCNC: 199 MG/DL (ref 74–99)
GLUCOSE BLD MANUAL STRIP-MCNC: 248 MG/DL (ref 74–99)
GLUCOSE BLD MANUAL STRIP-MCNC: 262 MG/DL (ref 74–99)
GLUCOSE SERPL-MCNC: 208 MG/DL (ref 74–99)
GLUCOSE SERPL-MCNC: 261 MG/DL (ref 74–99)
HCT VFR BLD AUTO: 24.7 % (ref 36–46)
HGB BLD-MCNC: 7.6 G/DL (ref 12–16)
HYPOCHROMIA BLD QL SMEAR: ABNORMAL
IMM GRANULOCYTES # BLD AUTO: 1.99 X10*3/UL (ref 0–0.7)
IMM GRANULOCYTES NFR BLD AUTO: 15.5 % (ref 0–0.9)
LYMPHOCYTES # BLD MANUAL: 0.39 X10*3/UL (ref 1.2–4.8)
LYMPHOCYTES NFR BLD MANUAL: 3 %
MAGNESIUM SERPL-MCNC: 1.89 MG/DL (ref 1.6–2.4)
MCH RBC QN AUTO: 29.9 PG (ref 26–34)
MCHC RBC AUTO-ENTMCNC: 30.8 G/DL (ref 32–36)
MCV RBC AUTO: 97 FL (ref 80–100)
METAMYELOCYTES # BLD MANUAL: 0.52 X10*3/UL
METAMYELOCYTES NFR BLD MANUAL: 4 %
MONOCYTES # BLD MANUAL: 0.13 X10*3/UL (ref 0.1–1)
MONOCYTES NFR BLD MANUAL: 1 %
MYELOCYTES # BLD MANUAL: 0.52 X10*3/UL
MYELOCYTES NFR BLD MANUAL: 4 %
NEUTROPHILS # BLD MANUAL: 10.96 X10*3/UL (ref 1.2–7.7)
NEUTS BAND # BLD MANUAL: 0.77 X10*3/UL (ref 0–0.7)
NEUTS BAND NFR BLD MANUAL: 6 %
NEUTS SEG # BLD MANUAL: 10.19 X10*3/UL (ref 1.2–7)
NEUTS SEG NFR BLD MANUAL: 79 %
NRBC BLD-RTO: 3.6 /100 WBCS (ref 0–0)
OVALOCYTES BLD QL SMEAR: ABNORMAL
PLATELET # BLD AUTO: 89 X10*3/UL (ref 150–450)
POLYCHROMASIA BLD QL SMEAR: ABNORMAL
POTASSIUM SERPL-SCNC: 3.5 MMOL/L (ref 3.5–5.3)
POTASSIUM SERPL-SCNC: 3.8 MMOL/L (ref 3.5–5.3)
RBC # BLD AUTO: 2.54 X10*6/UL (ref 4–5.2)
RBC MORPH BLD: ABNORMAL
SCHISTOCYTES BLD QL SMEAR: ABNORMAL
SODIUM SERPL-SCNC: 148 MMOL/L (ref 136–145)
SODIUM SERPL-SCNC: 148 MMOL/L (ref 136–145)
TOTAL CELLS COUNTED BLD: 100
TOXIC GRANULES BLD QL SMEAR: PRESENT
VARIANT LYMPHS # BLD MANUAL: 0.26 X10*3/UL (ref 0–0.5)
VARIANT LYMPHS NFR BLD: 2 %
WBC # BLD AUTO: 12.9 X10*3/UL (ref 4.4–11.3)

## 2024-05-14 PROCEDURE — 37799 UNLISTED PX VASCULAR SURGERY: CPT

## 2024-05-14 PROCEDURE — 2500000001 HC RX 250 WO HCPCS SELF ADMINISTERED DRUGS (ALT 637 FOR MEDICARE OP)

## 2024-05-14 PROCEDURE — 82374 ASSAY BLOOD CARBON DIOXIDE: CPT

## 2024-05-14 PROCEDURE — 99231 SBSQ HOSP IP/OBS SF/LOW 25: CPT | Performed by: NURSE PRACTITIONER

## 2024-05-14 PROCEDURE — 2500000006 HC RX 250 W HCPCS SELF ADMINISTERED DRUGS (ALT 637 FOR ALL PAYERS): Mod: MUE | Performed by: EMERGENCY MEDICINE

## 2024-05-14 PROCEDURE — 2500000004 HC RX 250 GENERAL PHARMACY W/ HCPCS (ALT 636 FOR OP/ED): Performed by: EMERGENCY MEDICINE

## 2024-05-14 PROCEDURE — 99291 CRITICAL CARE FIRST HOUR: CPT

## 2024-05-14 PROCEDURE — 2500000002 HC RX 250 W HCPCS SELF ADMINISTERED DRUGS (ALT 637 FOR MEDICARE OP, ALT 636 FOR OP/ED): Performed by: EMERGENCY MEDICINE

## 2024-05-14 PROCEDURE — 2500000001 HC RX 250 WO HCPCS SELF ADMINISTERED DRUGS (ALT 637 FOR MEDICARE OP): Performed by: EMERGENCY MEDICINE

## 2024-05-14 PROCEDURE — 93005 ELECTROCARDIOGRAM TRACING: CPT

## 2024-05-14 PROCEDURE — 2500000004 HC RX 250 GENERAL PHARMACY W/ HCPCS (ALT 636 FOR OP/ED)

## 2024-05-14 PROCEDURE — 85007 BL SMEAR W/DIFF WBC COUNT: CPT

## 2024-05-14 PROCEDURE — 82947 ASSAY GLUCOSE BLOOD QUANT: CPT | Mod: 91

## 2024-05-14 PROCEDURE — 92523 SPEECH SOUND LANG COMPREHEN: CPT | Mod: GN | Performed by: SPEECH-LANGUAGE PATHOLOGIST

## 2024-05-14 PROCEDURE — 94640 AIRWAY INHALATION TREATMENT: CPT

## 2024-05-14 PROCEDURE — 83735 ASSAY OF MAGNESIUM: CPT

## 2024-05-14 PROCEDURE — 80048 BASIC METABOLIC PNL TOTAL CA: CPT | Mod: 91

## 2024-05-14 PROCEDURE — 93010 ELECTROCARDIOGRAM REPORT: CPT | Performed by: INTERNAL MEDICINE

## 2024-05-14 PROCEDURE — 2020000001 HC ICU ROOM DAILY

## 2024-05-14 PROCEDURE — 2500000006 HC RX 250 W HCPCS SELF ADMINISTERED DRUGS (ALT 637 FOR ALL PAYERS): Performed by: NURSE PRACTITIONER

## 2024-05-14 PROCEDURE — 2500000004 HC RX 250 GENERAL PHARMACY W/ HCPCS (ALT 636 FOR OP/ED): Performed by: NURSE PRACTITIONER

## 2024-05-14 PROCEDURE — 85027 COMPLETE CBC AUTOMATED: CPT

## 2024-05-14 PROCEDURE — 99233 SBSQ HOSP IP/OBS HIGH 50: CPT | Performed by: INTERNAL MEDICINE

## 2024-05-14 PROCEDURE — 2500000002 HC RX 250 W HCPCS SELF ADMINISTERED DRUGS (ALT 637 FOR MEDICARE OP, ALT 636 FOR OP/ED)

## 2024-05-14 PROCEDURE — 2500000004 HC RX 250 GENERAL PHARMACY W/ HCPCS (ALT 636 FOR OP/ED): Performed by: STUDENT IN AN ORGANIZED HEALTH CARE EDUCATION/TRAINING PROGRAM

## 2024-05-14 RX ORDER — MAGNESIUM SULFATE HEPTAHYDRATE 40 MG/ML
2 INJECTION, SOLUTION INTRAVENOUS ONCE
Status: COMPLETED | OUTPATIENT
Start: 2024-05-14 | End: 2024-05-14

## 2024-05-14 RX ORDER — DEXTROSE MONOHYDRATE 50 MG/ML
100 INJECTION, SOLUTION INTRAVENOUS CONTINUOUS
Status: ACTIVE | OUTPATIENT
Start: 2024-05-14 | End: 2024-05-14

## 2024-05-14 RX ORDER — POTASSIUM CHLORIDE 20 MEQ/1
40 TABLET, EXTENDED RELEASE ORAL ONCE
Status: DISCONTINUED | OUTPATIENT
Start: 2024-05-14 | End: 2024-05-14

## 2024-05-14 RX ORDER — IPRATROPIUM BROMIDE AND ALBUTEROL SULFATE 2.5; .5 MG/3ML; MG/3ML
3 SOLUTION RESPIRATORY (INHALATION) 3 TIMES DAILY PRN
Status: DISCONTINUED | OUTPATIENT
Start: 2024-05-14 | End: 2024-05-18 | Stop reason: HOSPADM

## 2024-05-14 RX ORDER — FLUDROCORTISONE ACETATE 0.1 MG/1
0.1 TABLET ORAL DAILY
Status: DISCONTINUED | OUTPATIENT
Start: 2024-05-14 | End: 2024-05-18 | Stop reason: HOSPADM

## 2024-05-14 RX ORDER — ATOVAQUONE 750 MG/5ML
1500 SUSPENSION ORAL DAILY
Status: DISCONTINUED | OUTPATIENT
Start: 2024-05-14 | End: 2024-05-18 | Stop reason: HOSPADM

## 2024-05-14 RX ORDER — INSULIN LISPRO 100 [IU]/ML
0-15 INJECTION, SOLUTION INTRAVENOUS; SUBCUTANEOUS
Status: DISCONTINUED | OUTPATIENT
Start: 2024-05-14 | End: 2024-05-18 | Stop reason: HOSPADM

## 2024-05-14 RX ORDER — POTASSIUM CHLORIDE 20 MEQ/1
40 TABLET, EXTENDED RELEASE ORAL ONCE
Status: COMPLETED | OUTPATIENT
Start: 2024-05-14 | End: 2024-05-14

## 2024-05-14 RX ORDER — FLUTICASONE FUROATE AND VILANTEROL 200; 25 UG/1; UG/1
1 POWDER RESPIRATORY (INHALATION)
Status: DISCONTINUED | OUTPATIENT
Start: 2024-05-14 | End: 2024-05-18 | Stop reason: HOSPADM

## 2024-05-14 RX ORDER — INSULIN GLARGINE 100 [IU]/ML
10 INJECTION, SOLUTION SUBCUTANEOUS EVERY MORNING
Status: DISCONTINUED | OUTPATIENT
Start: 2024-05-14 | End: 2024-05-14

## 2024-05-14 RX ORDER — INSULIN GLARGINE 100 [IU]/ML
5 INJECTION, SOLUTION SUBCUTANEOUS EVERY MORNING
Status: DISCONTINUED | OUTPATIENT
Start: 2024-05-15 | End: 2024-05-15

## 2024-05-14 RX ADMIN — IPRATROPIUM BROMIDE AND ALBUTEROL SULFATE 3 ML: 2.5; .5 SOLUTION RESPIRATORY (INHALATION) at 07:45

## 2024-05-14 RX ADMIN — MYCOPHENOLATE MOFETIL 1000 MG: 250 CAPSULE ORAL at 09:00

## 2024-05-14 RX ADMIN — SODIUM CHLORIDE 1000 ML: 9 INJECTION, SOLUTION INTRAVENOUS at 12:05

## 2024-05-14 RX ADMIN — HYDRALAZINE HYDROCHLORIDE 10 MG: 20 INJECTION INTRAMUSCULAR; INTRAVENOUS at 23:04

## 2024-05-14 RX ADMIN — HYDROCORTISONE SODIUM SUCCINATE 50 MG: 100 INJECTION, POWDER, FOR SOLUTION INTRAMUSCULAR; INTRAVENOUS at 07:38

## 2024-05-14 RX ADMIN — Medication 100 MG: at 12:05

## 2024-05-14 RX ADMIN — Medication 1 TABLET: at 12:05

## 2024-05-14 RX ADMIN — LEVETIRACETAM 500 MG: 500 TABLET, FILM COATED ORAL at 20:38

## 2024-05-14 RX ADMIN — INSULIN LISPRO 3 UNITS: 100 INJECTION, SOLUTION INTRAVENOUS; SUBCUTANEOUS at 16:26

## 2024-05-14 RX ADMIN — HYDRALAZINE HYDROCHLORIDE 10 MG: 20 INJECTION INTRAMUSCULAR; INTRAVENOUS at 18:21

## 2024-05-14 RX ADMIN — INSULIN LISPRO 2 UNITS: 100 INJECTION, SOLUTION INTRAVENOUS; SUBCUTANEOUS at 07:40

## 2024-05-14 RX ADMIN — INSULIN GLARGINE 10 UNITS: 100 INJECTION, SOLUTION SUBCUTANEOUS at 09:00

## 2024-05-14 RX ADMIN — INSULIN LISPRO 6 UNITS: 100 INJECTION, SOLUTION INTRAVENOUS; SUBCUTANEOUS at 03:41

## 2024-05-14 RX ADMIN — TIOTROPIUM BROMIDE INHALATION SPRAY 2 PUFF: 3.12 SPRAY, METERED RESPIRATORY (INHALATION) at 12:04

## 2024-05-14 RX ADMIN — HYDROCORTISONE SODIUM SUCCINATE 50 MG: 100 INJECTION, POWDER, FOR SOLUTION INTRAMUSCULAR; INTRAVENOUS at 23:06

## 2024-05-14 RX ADMIN — FLUTICASONE FUROATE AND VILANTEROL TRIFENATATE 1 PUFF: 200; 25 POWDER RESPIRATORY (INHALATION) at 12:05

## 2024-05-14 RX ADMIN — HYDRALAZINE HYDROCHLORIDE 10 MG: 20 INJECTION INTRAMUSCULAR; INTRAVENOUS at 06:08

## 2024-05-14 RX ADMIN — DEXTROSE MONOHYDRATE 75 ML/HR: 50 INJECTION, SOLUTION INTRAVENOUS at 00:08

## 2024-05-14 RX ADMIN — FOLIC ACID 1 MG: 1 TABLET ORAL at 07:39

## 2024-05-14 RX ADMIN — MYCOPHENOLATE MOFETIL 1000 MG: 250 CAPSULE ORAL at 20:38

## 2024-05-14 RX ADMIN — DEXTROSE MONOHYDRATE 100 ML/HR: 50 INJECTION, SOLUTION INTRAVENOUS at 12:05

## 2024-05-14 RX ADMIN — APIXABAN 2.5 MG: 5 TABLET, FILM COATED ORAL at 07:39

## 2024-05-14 RX ADMIN — ATORVASTATIN CALCIUM 40 MG: 20 TABLET, FILM COATED ORAL at 20:38

## 2024-05-14 RX ADMIN — OLANZAPINE 5 MG: 10 TABLET, ORALLY DISINTEGRATING ORAL at 20:38

## 2024-05-14 RX ADMIN — Medication 5 MG: at 20:38

## 2024-05-14 RX ADMIN — HYDROCORTISONE SODIUM SUCCINATE 50 MG: 100 INJECTION, POWDER, FOR SOLUTION INTRAMUSCULAR; INTRAVENOUS at 15:48

## 2024-05-14 RX ADMIN — ATOVAQUONE 1500 MG: 750 SUSPENSION ORAL at 13:52

## 2024-05-14 RX ADMIN — MAGNESIUM SULFATE HEPTAHYDRATE 2 G: 40 INJECTION, SOLUTION INTRAVENOUS at 07:40

## 2024-05-14 RX ADMIN — POTASSIUM CHLORIDE 40 MEQ: 1500 TABLET, EXTENDED RELEASE ORAL at 07:39

## 2024-05-14 RX ADMIN — INSULIN LISPRO 9 UNITS: 100 INJECTION, SOLUTION INTRAVENOUS; SUBCUTANEOUS at 12:06

## 2024-05-14 RX ADMIN — FLUDROCORTISONE ACETATE 0.1 MG: 0.1 TABLET ORAL at 12:05

## 2024-05-14 RX ADMIN — LEVETIRACETAM 500 MG: 500 TABLET, FILM COATED ORAL at 09:00

## 2024-05-14 RX ADMIN — SODIUM CHLORIDE, POTASSIUM CHLORIDE, SODIUM LACTATE AND CALCIUM CHLORIDE 1000 ML: 600; 310; 30; 20 INJECTION, SOLUTION INTRAVENOUS at 17:24

## 2024-05-14 ASSESSMENT — PAIN SCALES - GENERAL
PAINLEVEL_OUTOF10: 0 - NO PAIN

## 2024-05-14 ASSESSMENT — PAIN - FUNCTIONAL ASSESSMENT
PAIN_FUNCTIONAL_ASSESSMENT: 0-10

## 2024-05-14 NOTE — PROGRESS NOTES
"Speech-Language Pathology    SLP Adult Inpatient Speech-Language Cognition    Patient Name: Bing Holliday  MRN: [id number]  Today's Date: 5/14/2024   Time Calculation  Start Time: 1110  Stop Time: 1130  Time Calculation (min): 20 min         Current Problem:   1. Disorientation  EEG      2. Acute pneumonia        3. Acute exacerbation of chronic obstructive pulmonary disease (COPD) (Multi)        4. Hypothermia, initial encounter        5. Shock, unspecified (Multi)  Transthoracic Echo (TTE) Limited    Transthoracic Echo (TTE) Limited      6. Bradycardia, unspecified  Transthoracic Echo (TTE) Limited          SLP Assessment:  SLP Assessment  SLP Assessment Results: Memory deficits, Safety/judgement deficits  Treatment Provided: No  Strengths: Motivation  Barriers: Cognition, Comorbidities    Pt with improved with cognition when compared to previous date but RN and pt both state some continued confusion and hallucinations. Pt reported she feels like her bed is filling up with mud/sand. She is aware this is not real but is unable to get rid of this sensation which is causing some distress. Pt was able to state why she was at the hospital in a decent amount of detail but has limited memory on events prior to admission. She was able to state she lives somewhere that ends in \"two [place]\" but could not state [place] without a prompt. She was able to state her name and birth date with 100% accuracy. She was confused on location, stating she thinks she is in \"[facility]\". Pt was corrected with verbal prompt but had difficulty remembering this information a short period of time later. Pt reported numerous times that she keeps to herself often and does not interact with other people due to fear of illness. Pt will continue to benefit from speech-language services addressing memory and safety deficits.     SLP Plan:  Plan  SLP Plan: Skilled SLP  SLP Frequency: 3x per week  SLP Discharge Recommendations: Continue " "skilled SLP services at the next level of care  Next Treatment Priority: Memory and safety skills  Discussed POC: Nursing, Patient  Patient/Caregiver Agreeable: Yes  SLP - OK to Discharge: No    Subjective   Pt upright in bed willing to participate in speech-language evaluation.     General Visit Information:  General Information  Reason for Referral: AMS  Referred By: ROSA ISELA Arias  Prior to Session Communication: Bedside nurse      Objective   Pt will complete short term memory tasks related to current situation with 75% accuracy.   Pt will make appropriate safety judgments when given a relatable scenario with 75% accuracy.    Pain:  Pain Assessment  Pain Assessment: 0-10  Pain Score: 0 - No pain    Cognition:  Cognition  Orientation Level: Disoriented to situation, Disoriented to place, Disoriented to time  Processing Speed: Delayed    Auditory Comprehension:   Auditory Comprehension  Yes/No Questions: Within Functional Limits  Commands: Within Functional Limits  Conversation: Impaired      Education:  Learner patient;    Barriers to Learning acuteness of illness barrier; cognitive limitations barrier;    Method demonstration; verbal; written- (Written \"Swallowing Guidelines\" posted at patient's bedside)   Education - Topic ST provided patient education regarding role of ST, purpose of assessment, clinical impressions, goals of treatment, and plan of care. Patient verbalized comprehension consistent with cognitive status. Education will be reinforced. ST further coordinated with RN regarding recommendations and precautions per this assessment, with RN verbalizing understanding.     Outcome    needs instruction; needs review/reinforcement; teach back/return demonstration;       "

## 2024-05-14 NOTE — PROGRESS NOTES
[facility] Critical Care Medicine       Date:  5/14/2024  Patient:  Bing Holliday  YOB: 1960  MRN:  [id number]   Admit Date:  5/7/2024  ========================================================================================================    Chief Complaint   Patient presents with    Altered Mental Status         History of Present Illness:  Bing Holliday is a 63 y.o. 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  History somewhat limited as the patient, while she awakens and is able to answer simple questions, is unable to offer any meaningful history.     Extensive chart review undertaken.  Patient with multiple presentations of the same.  Admitted from 1/17 - 1-28 to [facility] for similar presentation.  Underwent extensive workup there at that time, was treated with stress to steroids, rheumatology consultation who do not believe that she was in acute lupus flare, endocrinology consultation, as well as neurology consultation.  During that admission there was concern for potential seizure-like activity, the patient underwent a video EEG, was found to be in status, and was loaded with Keppra and continued on maintenance dosing.  She ultimately was discharged to skilled nursing facility for ongoing care.  At the time of discharge her noted prednisone dosing was 15 mg daily.     She was then readmitted to the hospital 3/9 through 3/14 with hypothermia, hyponatremia, altered mental status, and hypoglycemia.  She again underwent an extensive workup, was seen by endocrinology, manage on stress dose steroids, no infectious etiology elucidated  despite a very exhaustive search, she improved, and at that time she was discharged on what is documented as 20 mg of prednisone daily.     She then was readmitted to the hospital 3/19 through 3/26 with almost identical presentation of hypothermia, hyponatremia, hypoglycemia.  She was treated with stress dose steroids, underwent an MRI of the brain which was unrevealing, she returned to her baseline, and was discharged back to her skilled nursing facility.  At that time she was discharged home and is reported to be 20 mg of prednisone daily.     She then was readmitted to the hospital 4/24 to 4/30 for exactly the same presentation including hypoglycemia, hallucinations, hypothermia, and altered mental status.  And was treated in a very similar fashion including stress dose steroids.  She underwent an infectious workup, was treated for community-acquired pneumonia, and ultimately improved.  She was discharged with what was reported to be 20 mg of prednisone daily.     She presents today with hyperglycemia.  She received glucagon prior to arrival.  In the emergency department she was found to be leukopenic, hyponatremic, with a hemoglobin of 7.8 which is near her baseline hemoglobin.  She was given 1 g of ceftriaxone, 100 mg of hydrocortisone, and placed on a Allan hugger.  She was referred to the ICU for admission given her multiple medical comorbidities.       I was able to discuss case with the patient's skilled nursing facility, they note that she is getting 15 mg of prednisone daily. The recent discharge summary is from her prior admissions, the patient was to be discharged on 20 mg of prednisone as this is a dose that she has been well on.          Interval ICU Events:     5/7: Patient admitted to the ICU.  Seen and examined in the emergency department.  Somnolent but arouses to voice and follows simple commands.  Is without complaint.  Attempted to reach out to the patient's daughter who did not answer the  "phone.    5/8: No acute overnight events.  Patient remains easily arousable to voice but minimally interactive.  Hypothermia improving.  Hypoglycemia resolved.  Creatinine stable.  Urinalysis consistent with urinary tract infection.  Endocrinology consultation completed, agree with stress dose steroids.  EEG to be performed this morning.  Keppra level therapeutic.  Procalcitonin pending.    5/9: No acute overnight events.  Asymptomatic bradycardia noted on telemetry.  Patient becoming more responsive, agitated at times.  Discussed with son at bedside yesterday, notes that this is normal progression of her recovery when she is been hospitalized before.  No seizure activity reported on EEG.  Neurology consultation completed, recommending EEG, MRI when able.  Hypothermia improved, off Allan hugger.  Remains on stress dose steroids.  Urine culture finalized as negative.  Blood cultures prelim negative.  Urine antigens negative.  Anemic this morning with a hemoglobin of 6.9, 1 unit PRBCs ordered.    5/10: No acute overnight events.  Remains with asymptomatic bradycardia.  Electrophysiology saw the patient yesterday, may in the future require a pacemaker however no acute intervention as she is asymptomatic.  Mental status improving this morning, more alert and responsive to name, says \"good morning\" but does not answer other questions appropriately.  LP attempted at bedside yesterday without success, plan for potential IR guided LP today however given improving mental status with stress dose steroids low suspicion for bacterial meningitis/viral encephalitis remains low but given her immunocompromise state will need to rule out.    5/11: No acute overnight events.  Patient's mental status unchanged.  Awakens to voice, does not answer questions appropriately, however she appears that she is trying to say hospital when asked where she is.  LP completed yesterday, cultures pending.  Rapid meningitis panel negative.  All " "cultures have remained negative to date.  Bradycardia has resolved.  Glucose stable    5/12: Patient much improved this morning.  She is more awake and alert.  Able to state her name.  When told that she is was in the hospital she stated \"what is wrong with me\".  Knows her children's names.  Denies pain.  Remains without leukocytosis.  Hemoglobin stable.  Creatinine near baseline.  CSF so far negative.  Gram stain/culture currently pending and remains on empiric meningitis/encephalitis treatment.  In rate controlled A-fib on the monitor.  MRI completed yesterday without acute findings.    5/13: Pt is alert to self this am. Following minimal commands. Denies pain. Hemodynamically stable, converted to sinus angelica. On RA with SPO2 greater than 92%.  HGB 7.6. No leukocytosis.     5/14: Patient is awake alert x 3 this morning.  Following all commands.  Denies pain or shortness of breath this morning.  Patient is sinus bradycardia and hemodynamically stable.  Hemoglobin stable.  Switched hydrocortisone to 50 every 8 and started Florinef per endocrinology.    Medical History:  Past Medical History:   Diagnosis Date    Abnormal kidney function 04/04/2023    Acute headache 03/10/2024    Acute low back pain 10/30/2023    Acute upper respiratory infection, unspecified 03/04/2020    Acute URI    Acute upper respiratory infection, unspecified 09/30/2015    URTI (acute upper respiratory infection)    Arthritis     Atypical facial pain 03/10/2024    Body mass index (BMI) 23.0-23.9, adult 10/15/2021    BMI 23.0-23.9, adult    Body mass index (BMI) 33.0-33.9, adult 03/04/2020    BMI 33.0-33.9,adult    Cardiomegaly 08/27/2013    Left ventricular hypertrophy    Chest pain 06/10/2022    Comment on above: Added by Problem List Migration; 2013-3-12; Moved to Suppressed Nov 25 2013 9:16PM; Comment on above: Added by Problem List Migration; 2013-3-12; Moved to Suppressed Nov 25 2013 9:16PM;    Chronic kidney disease, stage 3 unspecified " (Multi) 07/02/2013    Chronic kidney disease, stage III (moderate)    Confusional state 03/10/2024    Contact with and (suspected) exposure to covid-19 04/04/2023    Delirium 03/10/2024    Disease of pericardium, unspecified ([facility]) 07/02/2013    Pericardial disease    Encounter for follow-up examination after completed treatment for conditions other than malignant neoplasm 10/06/2022    Hospital discharge follow-up    Generalized contraction of visual field, right eye 01/29/2015    Generalized contraction of visual field of right eye    Hearing loss 03/10/2024    History of cataract 03/10/2024    History of thrombocytopenia 03/10/2024    Comment on above: Added by Problem List Migration; 2013-7-2;    Homonymous bilateral field defects, right side 04/29/2016    Homonymous bilateral field defects of right side    Hypertensive chronic kidney disease with stage 1 through stage 4 chronic kidney disease, or unspecified chronic kidney disease 07/02/2013    Nephrosclerosis    Laceration without foreign body, left foot, initial encounter 07/03/2018    Foot laceration, left, initial encounter    Localized edema 03/10/2024    Mass of skin 03/10/2024    Migraine with aura, not intractable, without status migrainosus 10/24/2022    Ocular migraine    Open wound 03/10/2024    Open wound of left foot 03/10/2024    Other conditions influencing health status 07/02/2013    Chronic Glomerulonephritis In Diseases Classified Elsewhere    Other conditions influencing health status 07/02/2013    Progressive Familial Myoclonic Epilepsy    Other conditions influencing health status 07/02/2013    Protein S Deficiency    Other conditions influencing health status 05/22/2015    Familial Combined Hyperlipidemia    Other conditions influencing health status 10/24/2022    IDDM (insulin dependent diabetes mellitus)    Other conditions influencing health status 03/14/2022    Diabetes mellitus, insulin dependent (IDDM), uncontrolled    Other long  term (current) drug therapy 10/24/2022    Long-term use of Plaquenil    Overweight with body mass index (BMI) 25.0-29.9 03/10/2024    Pain of toe 03/10/2024    Personal history of COVID-19 04/04/2023    Personal history of diseases of the blood and blood-forming organs and certain disorders involving the immune mechanism 07/02/2013    History of thrombocytopenia    Personal history of diseases of the skin and subcutaneous tissue 08/11/2015    History of foot ulcer    Personal history of nephrotic syndrome 07/02/2013    History of nephrotic syndrome    Personal history of other diseases of the circulatory system 08/27/2013    History of sinus tachycardia    Personal history of other diseases of the nervous system and sense organs     History of cataract    Personal history of other diseases of the respiratory system     History of bronchitis    Personal history of other infectious and parasitic diseases 07/02/2013    History of hepatitis    Personal history of other specified conditions     History of shortness of breath    Personal history of other specified conditions 08/27/2013    History of edema    Posterior epistaxis 03/10/2024    Puckering of macula, right eye 10/24/2022    ERM OD (epiretinal membrane, right eye)    Raynaud's syndrome without gangrene 07/02/2013    Raynaud's disease    Sinusitis 03/10/2024    Systemic lupus erythematosus, unspecified (Multi) 07/24/2015    SLE (systemic lupus erythematosus)    Systemic lupus erythematosus, unspecified (Multi) 07/24/2015    SLE (systemic lupus erythematosus)    Systemic lupus erythematosus, unspecified (Multi) 07/24/2015    Systemic lupus    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) 07/02/2013    Type 2 diabetes with nephropathy    Type 2 diabetes mellitus with mild nonproliferative diabetic retinopathy without macular edema, left eye (Multi) 07/27/2015    Non-proliferative diabetic retinopathy, left eye    Type 2 diabetes mellitus with mild  nonproliferative diabetic retinopathy without macular edema, unspecified eye (Multi) 07/24/2015    Mild non proliferative diabetic retinopathy    Type 2 diabetes mellitus with proliferative diabetic retinopathy without macular edema, right eye (Multi) 07/27/2015    Proliferative diabetic retinopathy of right eye    Type 2 diabetes mellitus with proliferative diabetic retinopathy without macular edema, unspecified eye (Multi) 07/24/2015    Diabetic proliferative retinopathy    Unspecified acute and subacute iridocyclitis 07/24/2015    Acute iritis, right eye    Unspecified open wound, left foot, sequela 07/03/2018    Wound, open, foot, left, sequela     Past Surgical History:   Procedure Laterality Date    ANKLE SURGERY  01/29/2015    Ankle Surgery    CHOLECYSTECTOMY  01/29/2015    Cholecystectomy    CT GUIDED PERCUTANEOUS BIOPSY BONE DEEP  5/4/2021    CT GUIDED PERCUTANEOUS BIOPSY BONE DEEP 5/4/2021 [facility] CL[facility]ICAL [facility]GAC[facility]    EYE SURGERY  03/06/2015    Eye Surgery    FOOT SURGERY  01/29/2015    Foot Surgery    MR HEAD ANGIO WO IV CONTRAST  7/26/2013    MR HEAD ANGIO WO IV CONTRAST 7/26/2013 [facility] CLINICAL LEGACY    MR HEAD ANGIO WO IV CONTRAST  9/17/2021    MR HEAD ANGIO WO IV CONTRAST 9/17/2021 [facility]    MR HEAD ANGIO WO IV CONTRAST  3/25/2023    MR HEAD ANGIO WO IV CONTRAST [facility]    MR NECK ANGIO WO IV CONTRAST  7/26/2013    MR NECK ANGIO WO IV CONTRAST 7/26/2013 [facility] CLINICAL LEGACY    MR NECK ANGIO WO IV CONTRAST  9/17/2021    MR NECK ANGIO WO IV CONTRAST 9/17/2021 [facility]    MR NECK ANGIO WO IV CONTRAST  3/25/2023    MR NECK ANGIO WO IV CONTRAST [facility]    OTHER SURGICAL HISTORY  01/29/2015    Creation Of Pericardial Window    OTHER SURGICAL HISTORY  01/29/2015    Quadricepsplasty    TOTAL HIP ARTHROPLASTY  01/29/2015    Hip Replacement     Medications Prior to Admission   Medication Sig Dispense Refill Last Dose    apixaban (Eliquis) 2.5 mg tablet Take 1 tablet (2.5 mg) by mouth 2  times a day. 60 tablet 1 5/7/2024 at 0900    fluticasone-umeclidin-vilanter (TRELEGY-ELLIPTA) 200-62.5-25 mcg blister with device Inhale 1 puff once daily. 60 each 5 5/7/2024 at 0900    folic acid (Folvite) 1 mg tablet TAKE 1 TABLET BY MOUTH EVERY DAY 90 tablet 1 5/7/2024 at 0900    glucagon HCL (Glucagon, HCl, Emergency Kit) 1 mg recon soln Inject 1 mg into the muscle if needed.   5/7/2024 at 1225    guaiFENesin (Mucinex) 600 mg 12 hr tablet Take 2 tablets (1,200 mg) by mouth 2 times a day. Do not crush, chew, or split.   5/7/2024 at 0900    insulin glargine (Lantus U-100 Insulin) 100 unit/mL injection Inject 10 Units under the skin once daily in the morning. Take as directed per insulin instructions.   5/7/2024 at 0800    insulin lispro (HumaLOG) 100 unit/mL injection Inject under the skin 3 times a day as needed for high blood sugar (before meals). Take as directed per insulin Sliding Scale instructions.  0-150 = 0 units  151-200 = 2 units  201-250 = 4 units  251-300 = 6 units  301-350 = 8 units  351-400 = 10 units  >400 = give 10 units and contact MD   5/7/2024 at 0730- 8 UNITS GIVEN    levETIRAcetam (Keppra) 500 mg tablet Take 1 tablet (500 mg) by mouth every 12 hours. 60 tablet 0 5/7/2024 at 0900    magnesium oxide (Mag-Ox) 400 mg (241.3 mg magnesium) tablet Take 2 tablets (800 mg) by mouth once daily. Do not fill before May 1, 2024.   5/7/2024 at 0900    multivitamin with minerals tablet Take 1 tablet by mouth once daily.   5/7/2024 at 0900    mycophenolate (Cellcept) 500 mg tablet TAKE 2 TABLETS BY MOUTH TWICE A  tablet 0 5/7/2024 at 0900    nut.tx.gluc intol,lf,soy/fiber (BOOST GLUCOSE CONTROL ORAL) Take 8 fluid ounces by mouth once daily in the morning.   5/7/2024 at 0900    OLANZapine (ZyPREXA) 5 mg tablet Take 1 tablet (5 mg) by mouth 2 times a day.   5/7/2024 at 0900    pantoprazole (ProtoNix) 40 mg EC tablet TAKE 1 TABLET BY MOUTH EVERY DAY 90 tablet 1 5/7/2024 at 0800    predniSONE (Deltasone)  "5 mg tablet Take 3 tablets (15 mg) by mouth once daily.   5/7/2024 at 0900    thiamine 100 mg tablet Take 1 tablet (100 mg) by mouth once daily.   5/7/2024 at 0900    acetaminophen (Tylenol) 325 mg tablet Take 2 tablets (650 mg) by mouth every 4 hours if needed for mild pain (1 - 3), moderate pain (4 - 6) or fever (temp greater than 38.0 C).   5/2/2024    acetaminophen (Tylenol) 500 mg tablet Take 2 tablets (1,000 mg) by mouth every 4 hours if needed for mild pain (1 - 3) or moderate pain (4 - 6).   Unknown    albuterol 90 mcg/actuation inhaler Inhale 2 puffs every 6 hours if needed for shortness of breath or wheezing.   Unknown    atorvastatin (Lipitor) 40 mg tablet Take 1 tablet (40 mg) by mouth once daily. (Patient taking differently: Take 1 tablet (40 mg) by mouth once daily at bedtime.) 90 tablet 3 5/6/2024 at 2100    balsam peru-castor oiL (Venelex) ointment Apply topically 2 times a day. APPLY TO BUTTOCKS, SACRUM, AND THIGHS.   5/6/2024 at PM    blood-glucose sensor (Dexcom G6 Sensor) device Use to check sugars 3 times daily 4 each 2     Dexcom G4 platinum  (Dexcom G6 ) misc Use as instructed 1 each 0     Dexcom G4 platinum transmitter (Dexcom G6 Transmitter) device Use as instructed 1 each 0     meclizine (Antivert) 25 mg tablet Take 1 tablet (25 mg) by mouth every 12 hours if needed for dizziness.   Unknown    melatonin 5 mg tablet Take 1 tablet (5 mg) by mouth once daily at bedtime.   5/6/2024 at 2100    pen needle, diabetic 31 gauge x 5/16\" needle Use to inject 1-4 times daily as directed. 100 each 11      Ace inhibitors, Hydroxychloroquine, Lisinopril, Penicillins, and Sulfa (sulfonamide antibiotics)  Social History     Tobacco Use    Smoking status: Never     Passive exposure: Never    Smokeless tobacco: Never   Vaping Use    Vaping status: Never Used   Substance Use Topics    Alcohol use: Not Currently     Comment: RARE    Drug use: Never     Family History   Problem Relation Name Age " of Onset    Other (RENAL DISEASE) Mother          END STAGE    Other (CARDIAC DISORDER) Mother      Cataracts Mother      Stroke Mother      Diabetes Mother      Kidney disease Mother      Lupus Mother      Other (CARDIAC DISORDER) Father      COPD Father      Glaucoma Father      Hypertension Father      Sleep apnea Father      Other (CARDIAC DISORDER) Sister      Depression Sister      Kidney disease Sister      Sickle cell trait Sister      Sleep apnea Daughter         Review of Systems:  14 point review of systems was completed and negative except for those specially mention in my HPI    Physical Exam:    Heart Rate:  [42-74]   Temp:  [35.8 °C (96.4 °F)-36 °C (96.8 °F)]   Resp:  [11-26]   BP: (159-185)/(66-86)   Weight:  [102 kg (224 lb 13.9 oz)]   SpO2:  [94 %-100 %]     Physical Exam  Vitals and nursing note reviewed.   Constitutional:       Appearance: She is ill-appearing.   HENT:      Head: Normocephalic and atraumatic.      Nose: Nose normal.      Mouth/Throat:      Mouth: Mucous membranes are dry.   Eyes:      Extraocular Movements: Extraocular movements intact.      Pupils: Pupils are equal, round, and reactive to light.   Cardiovascular:      Rate and Rhythm: Normal rate. Rhythm irregular.   Pulmonary:      Effort: Pulmonary effort is normal.      Breath sounds: Normal breath sounds.   Abdominal:      General: Abdomen is flat. There is no distension.      Tenderness: There is no abdominal tenderness.   Musculoskeletal:      Cervical back: Normal range of motion.      Right lower leg: No edema.      Left lower leg: No edema.   Skin:     General: Skin is warm and dry.      Capillary Refill: Capillary refill takes less than 2 seconds.   Neurological:      General: No focal deficit present.      Mental Status: She is alert. She is disoriented.         Objective:    ECG 12 Lead    Result Date: 5/14/2024  Sinus bradycardia Possible Left atrial enlargement Left ventricular hypertrophy Nonspecific ST  abnormality Abnormal ECG When compared with ECG of 13-MAY-2024 06:33, (unconfirmed) No significant change was found    Bedside Midline Imaging    Result Date: 5/13/2024  These images are not reportable by radiology and will not be interpreted by  Radiologists.    ECG 12 Lead    Result Date: 5/13/2024  Sinus bradycardia Voltage criteria for left ventricular hypertrophy Abnormal ECG When compared with ECG of 12-MAY-2024 06:38, Sinus rhythm has replaced Atrial fibrillation Nonspecific T wave abnormality no longer evident in Lateral leads      Results for orders placed or performed during the hospital encounter of 05/07/24 (from the past 24 hour(s))   POCT GLUCOSE   Result Value Ref Range    POCT Glucose 143 (H) 74 - 99 mg/dL   Blood Gas Venous Full Panel   Result Value Ref Range    POCT pH, Venous 7.45 (H) 7.33 - 7.43 pH    POCT pCO2, Venous 50 41 - 51 mm Hg    POCT pO2, Venous 26 (L) 35 - 45 mm Hg    POCT SO2, Venous 43 (L) 45 - 75 %    POCT Oxy Hemoglobin, Venous 41.9 (L) 45.0 - 75.0 %    POCT Hematocrit Calculated, Venous 25.0 (L) 36.0 - 46.0 %    POCT Sodium, Venous 145 136 - 145 mmol/L    POCT Potassium, Venous 4.2 3.5 - 5.3 mmol/L    POCT Chloride, Venous 110 (H) 98 - 107 mmol/L    POCT Ionized Calicum, Venous 1.22 1.10 - 1.33 mmol/L    POCT Glucose, Venous 201 (H) 74 - 99 mg/dL    POCT Lactate, Venous 0.8 0.4 - 2.0 mmol/L    POCT Base Excess, Venous 9.7 (H) -2.0 - 3.0 mmol/L    POCT HCO3 Calculated, Venous 34.8 (H) 22.0 - 26.0 mmol/L    POCT Hemoglobin, Venous 8.3 (L) 12.0 - 16.0 g/dL    POCT Anion Gap, Venous 4.0 (L) 10.0 - 25.0 mmol/L    Patient Temperature      FiO2 21 %   Ammonia   Result Value Ref Range    Ammonia 26 16 - 53 umol/L   VTM Pink Tube   Result Value Ref Range    Extra Tube Hold for add-ons.    POCT GLUCOSE   Result Value Ref Range    POCT Glucose 227 (H) 74 - 99 mg/dL   POCT GLUCOSE   Result Value Ref Range    POCT Glucose 247 (H) 74 - 99 mg/dL   POCT GLUCOSE   Result Value Ref Range    POCT  Glucose 270 (H) 74 - 99 mg/dL   POCT GLUCOSE   Result Value Ref Range    POCT Glucose 248 (H) 74 - 99 mg/dL   CBC and Auto Differential   Result Value Ref Range    WBC 12.9 (H) 4.4 - 11.3 x10*3/uL    nRBC 3.6 (H) 0.0 - 0.0 /100 WBCs    RBC 2.54 (L) 4.00 - 5.20 x10*6/uL    Hemoglobin 7.6 (L) 12.0 - 16.0 g/dL    Hematocrit 24.7 (L) 36.0 - 46.0 %    MCV 97 80 - 100 fL    MCH 29.9 26.0 - 34.0 pg    MCHC 30.8 (L) 32.0 - 36.0 g/dL    RDW 18.0 (H) 11.5 - 14.5 %    Platelets 89 (L) 150 - 450 x10*3/uL    Immature Granulocytes %, Automated 15.5 (H) 0.0 - 0.9 %    Immature Granulocytes Absolute, Automated 1.99 (H) 0.00 - 0.70 x10*3/uL   Basic Metabolic Panel   Result Value Ref Range    Glucose 261 (H) 74 - 99 mg/dL    Sodium 148 (H) 136 - 145 mmol/L    Potassium 3.5 3.5 - 5.3 mmol/L    Chloride 109 (H) 98 - 107 mmol/L    Bicarbonate 34 (H) 21 - 32 mmol/L    Anion Gap 9 (L) 10 - 20 mmol/L    Urea Nitrogen 23 6 - 23 mg/dL    Creatinine 1.85 (H) 0.50 - 1.05 mg/dL    eGFR 31 (L) >60 mL/min/1.73m*2    Calcium 8.1 (L) 8.6 - 10.3 mg/dL   Magnesium   Result Value Ref Range    Magnesium 1.89 1.60 - 2.40 mg/dL   Manual Differential   Result Value Ref Range    Neutrophils %, Manual 79.0 40.0 - 80.0 %    Bands %, Manual 6.0 0.0 - 5.0 %    Lymphocytes %, Manual 3.0 13.0 - 44.0 %    Monocytes %, Manual 1.0 2.0 - 10.0 %    Eosinophils %, Manual 0.0 0.0 - 6.0 %    Basophils %, Manual 1.0 0.0 - 2.0 %    Atypical Lymphocytes %, Manual 2.0 0.0 - 2.0 %    Metamyelocytes %, Manual 4.0 0.0 - 0.0 %    Myelocytes %, Manual 4.0 0.0 - 0.0 %    Seg Neutrophils Absolute, Manual 10.19 (H) 1.20 - 7.00 x10*3/uL    Bands Absolute, Manual 0.77 (H) 0.00 - 0.70 x10*3/uL    Lymphocytes Absolute, Manual 0.39 (L) 1.20 - 4.80 x10*3/uL    Monocytes Absolute, Manual 0.13 0.10 - 1.00 x10*3/uL    Eosinophils Absolute, Manual 0.00 0.00 - 0.70 x10*3/uL    Basophils Absolute, Manual 0.13 (H) 0.00 - 0.10 x10*3/uL    Atypical Lymphs Absolute, Manual 0.26 0.00 - 0.50  x10*3/uL    Metamyelocytes Absolute, Manual 0.52 0.00 - 0.00 x10*3/uL    Myelocytes Absolute, Manual 0.52 0.00 - 0.00 x10*3/uL    Total Cells Counted 100     Neutrophils Absolute, Manual 10.96 (H) 1.20 - 7.70 x10*3/uL    RBC Morphology See Below     Polychromasia Mild     Hypochromia Mild     RBC Fragments Few     Ovalocytes Few     Katarzyna Cells Few     Toxic Granulation Present    ECG 12 Lead   Result Value Ref Range    Ventricular Rate 56 BPM    Atrial Rate 56 BPM    CA Interval 146 ms    QRS Duration 96 ms    QT Interval 436 ms    QTC Calculation(Bazett) 420 ms    P Axis 53 degrees    R Axis -10 degrees    T Axis -51 degrees    QRS Count 9 beats    Q Onset 219 ms    P Onset 146 ms    P Offset 208 ms    T Offset 437 ms    QTC Fredericia 426 ms   POCT GLUCOSE   Result Value Ref Range    POCT Glucose 179 (H) 74 - 99 mg/dL        Assessment/Plan:    I am currently managing this critically ill patient for the following problems:    Acute encephalopathy: Multiple presentations of the same, believed to be due to adrenal insufficiency.    History of lupus cerebritis  History of seizure (believed to be focal seizure in nature based on chart review)  History of coronary artery disease  History of atrial fibrillation on Eliquis  Hx of heart failure with reduced EF not in acute exacerbation   History of GERD  Recurrent hypoglycemic episodes believed to be due to adrenal insufficiency - possibly due to med rec error.   History of type 2 diabetes now with mild hyperglycemia   History of adrenal insufficiency  Chronic anemia  History of lupus  Immune suppressed     Neuro/Psych/Pain Ctrl/Sedation:  History of lupus cerebritis  History of seizure (believed to be focal seizure in nature based on chart review)  Acute encephalopathy: Multiple presentations of the same, believed to be due to adrenal insufficiency.    Tylenol available for pain control  Resume Zyprexa ODT at night (takes BID normally)  Continue Keppra given her history  of seizure, level therapeutic   EEG neg for seizure   Neurology following  LP negative, discontinue CNS coverage     Respiratory/ENT:  Maintain SpO2 greater than 90%, currently on room air  DuoNebs Q6  Home inhalers       Cardiovascular:  History of coronary artery disease  History of atrial fibrillation on Eliquis  Hx of heart failure with reduced EF not in acute exacerbation   Maintain MAP greater than 65, not currently on any vasoactive agents  Hold Statin: Can resume when able to take p.o.  Has been unable to tolerate GDMT in the past due to allergies and other comorbidities  ASA given hx of CAD: Can resume when she is able to take p.o.  Okay to resume systemic anticoagulation (Eliquis) when taking p.o.  Bradycardia asymptomatic - observe for now   Echocardiogram with ejection fraction of 40 to 45%, unchanged from prior in May     GI:  Diet order pending improvement in mental status: Swallow evaluation today  Home PPI     Renal/Volume Status (Intra & Extravascular):  JED on CKD  CR 1.85, bicarb 34, chloride 109  Was given magnesium this morning  Trend RFP avoid nephrotoxic medications      Endocrine  History of type 2 diabetes now with mild hyperglycemia   History of adrenal insufficiency  History of lupus  Stress dose steroids 50 every 8 per Endo  Started Florinef per Endo  ENDO following for steroid dosing   Should be on at least 20mg Prednisone NOT 15mg when acute process resolved   Continue Lantus 5 units  Continue sliding scale  D5 water at 100ml/hr for 10 hours   NS 1L bolus      Infectious Disease:  Immune suppressed  DC Abx/Acyclovir - CSF studies have been negative   Daily CBC  Monitor for SIRS  Started atovaquone for PJP prophylaxis     Heme/Onc:  History of chronic anemia  Transfuse for symptomatic anemia, no current indication   Resume eliquis      Ethics/Code Status:  Full Code     :  DVT Prophylaxis: Eliquis  GI Prophylaxis: Not indicated  Diet: per SLP   CVC: Removed now has  midline  Anna: NA  Montano: ANDREW  Restraints: NA  Dispo: Floor versus ICU     I have personally spent 35 minutes of critical care time, exclusive of time spent on any procedures, in evaluation and management of this critically ill patient        Alla Arias, JIM-CNP

## 2024-05-14 NOTE — PROGRESS NOTES
"    Cardiology Progress Note  Patient: Bing Holliday  Unit/Bed: 07/07-A  YOB: 1960  MRN: [id number]  Acct: [id number]   Admitting Diagnosis:   Disorientation [R41.0]  Acute exacerbation of chronic obstructive pulmonary disease (COPD) (Multi) [J44.1]  Hypothermia, initial encounter [T68.XXXA]  Acute pneumonia [J18.9]  Date:  5/7/2024  Hospital Day: 7  Attending: Pranay Martinez MD   Rounding CRISTINA/Cardiologist:  Kiara Frankel APRN-CNP,        Primary Cardiologist: Dr. Antonio Rodriges      Complaint:  Chief Complaint   Patient presents with    Altered Mental Status        SUBJECTIVE    Awake and alert  Denies dizziness, lightheadedness, or shortness of breath          VITALS   Visit Vitals  /68 (BP Location: Right arm, Patient Position: Lying)   Pulse 70   Temp 35.9 °C (96.6 °F) (Temporal)   Resp 23        I/O:    Intake/Output Summary (Last 24 hours) at 5/14/2024 1043  Last data filed at 5/14/2024 0940  Gross per 24 hour   Intake 3308.5 ml   Output 1250 ml   Net 2058.5 ml        Allergies:  Allergies   Allergen Reactions    Ace Inhibitors Swelling, Angioedema, Shortness of breath and Other     'FACIAL TWISTING\" \"LOOKS LIKE I HAD A STROKE IN MY SLEEP\"    Hydroxychloroquine Other, Nausea And Vomiting, Nausea/vomiting and Unknown     RETINAL BLEEDING    RETINAL BLEEDING      RETINAL BLEEDING, Eye problems    Lisinopril Swelling    Penicillins Unknown     tolerates cephalosporins-unknown childhood allergy    Sulfa (Sulfonamide Antibiotics) Hives        PHYSICAL EXAM   Physical Exam  Constitutional:       Appearance: Normal appearance.   HENT:      Head: Normocephalic and atraumatic.      Nose: Nose normal.      Mouth/Throat:      Mouth: Mucous membranes are moist.   Eyes:      Conjunctiva/sclera: Conjunctivae normal.   Cardiovascular:      Rate and Rhythm: Normal rate and regular rhythm.      Pulses: Normal pulses.      Heart sounds: Normal heart sounds.   Pulmonary:      Effort: Pulmonary effort " is normal.   Musculoskeletal:         General: Deformity present. Normal range of motion.      Right lower leg: Edema present.      Left lower leg: Edema present.   Skin:     General: Skin is warm and dry.   Neurological:      General: No focal deficit present.      Mental Status: She is alert and oriented to person, place, and time.   Psychiatric:         Mood and Affect: Mood normal.         Behavior: Behavior normal.           LABS     Results for orders placed or performed during the hospital encounter of 05/07/24 (from the past 24 hour(s))   Blood Gas Venous Full Panel   Result Value Ref Range    POCT pH, Venous 7.45 (H) 7.33 - 7.43 pH    POCT pCO2, Venous 50 41 - 51 mm Hg    POCT pO2, Venous 26 (L) 35 - 45 mm Hg    POCT SO2, Venous 43 (L) 45 - 75 %    POCT Oxy Hemoglobin, Venous 41.9 (L) 45.0 - 75.0 %    POCT Hematocrit Calculated, Venous 25.0 (L) 36.0 - 46.0 %    POCT Sodium, Venous 145 136 - 145 mmol/L    POCT Potassium, Venous 4.2 3.5 - 5.3 mmol/L    POCT Chloride, Venous 110 (H) 98 - 107 mmol/L    POCT Ionized Calicum, Venous 1.22 1.10 - 1.33 mmol/L    POCT Glucose, Venous 201 (H) 74 - 99 mg/dL    POCT Lactate, Venous 0.8 0.4 - 2.0 mmol/L    POCT Base Excess, Venous 9.7 (H) -2.0 - 3.0 mmol/L    POCT HCO3 Calculated, Venous 34.8 (H) 22.0 - 26.0 mmol/L    POCT Hemoglobin, Venous 8.3 (L) 12.0 - 16.0 g/dL    POCT Anion Gap, Venous 4.0 (L) 10.0 - 25.0 mmol/L    Patient Temperature      FiO2 21 %   Ammonia   Result Value Ref Range    Ammonia 26 16 - 53 umol/L   VTM Pink Tube   Result Value Ref Range    Extra Tube Hold for add-ons.    POCT GLUCOSE   Result Value Ref Range    POCT Glucose 227 (H) 74 - 99 mg/dL   POCT GLUCOSE   Result Value Ref Range    POCT Glucose 247 (H) 74 - 99 mg/dL   POCT GLUCOSE   Result Value Ref Range    POCT Glucose 270 (H) 74 - 99 mg/dL   POCT GLUCOSE   Result Value Ref Range    POCT Glucose 248 (H) 74 - 99 mg/dL   CBC and Auto Differential   Result Value Ref Range    WBC 12.9 (H) 4.4 -  11.3 x10*3/uL    nRBC 3.6 (H) 0.0 - 0.0 /100 WBCs    RBC 2.54 (L) 4.00 - 5.20 x10*6/uL    Hemoglobin 7.6 (L) 12.0 - 16.0 g/dL    Hematocrit 24.7 (L) 36.0 - 46.0 %    MCV 97 80 - 100 fL    MCH 29.9 26.0 - 34.0 pg    MCHC 30.8 (L) 32.0 - 36.0 g/dL    RDW 18.0 (H) 11.5 - 14.5 %    Platelets 89 (L) 150 - 450 x10*3/uL    Immature Granulocytes %, Automated 15.5 (H) 0.0 - 0.9 %    Immature Granulocytes Absolute, Automated 1.99 (H) 0.00 - 0.70 x10*3/uL   Basic Metabolic Panel   Result Value Ref Range    Glucose 261 (H) 74 - 99 mg/dL    Sodium 148 (H) 136 - 145 mmol/L    Potassium 3.5 3.5 - 5.3 mmol/L    Chloride 109 (H) 98 - 107 mmol/L    Bicarbonate 34 (H) 21 - 32 mmol/L    Anion Gap 9 (L) 10 - 20 mmol/L    Urea Nitrogen 23 6 - 23 mg/dL    Creatinine 1.85 (H) 0.50 - 1.05 mg/dL    eGFR 31 (L) >60 mL/min/1.73m*2    Calcium 8.1 (L) 8.6 - 10.3 mg/dL   Magnesium   Result Value Ref Range    Magnesium 1.89 1.60 - 2.40 mg/dL   Manual Differential   Result Value Ref Range    Neutrophils %, Manual 79.0 40.0 - 80.0 %    Bands %, Manual 6.0 0.0 - 5.0 %    Lymphocytes %, Manual 3.0 13.0 - 44.0 %    Monocytes %, Manual 1.0 2.0 - 10.0 %    Eosinophils %, Manual 0.0 0.0 - 6.0 %    Basophils %, Manual 1.0 0.0 - 2.0 %    Atypical Lymphocytes %, Manual 2.0 0.0 - 2.0 %    Metamyelocytes %, Manual 4.0 0.0 - 0.0 %    Myelocytes %, Manual 4.0 0.0 - 0.0 %    Seg Neutrophils Absolute, Manual 10.19 (H) 1.20 - 7.00 x10*3/uL    Bands Absolute, Manual 0.77 (H) 0.00 - 0.70 x10*3/uL    Lymphocytes Absolute, Manual 0.39 (L) 1.20 - 4.80 x10*3/uL    Monocytes Absolute, Manual 0.13 0.10 - 1.00 x10*3/uL    Eosinophils Absolute, Manual 0.00 0.00 - 0.70 x10*3/uL    Basophils Absolute, Manual 0.13 (H) 0.00 - 0.10 x10*3/uL    Atypical Lymphs Absolute, Manual 0.26 0.00 - 0.50 x10*3/uL    Metamyelocytes Absolute, Manual 0.52 0.00 - 0.00 x10*3/uL    Myelocytes Absolute, Manual 0.52 0.00 - 0.00 x10*3/uL    Total Cells Counted 100     Neutrophils Absolute, Manual  10.96 (H) 1.20 - 7.70 x10*3/uL    RBC Morphology See Below     Polychromasia Mild     Hypochromia Mild     RBC Fragments Few     Ovalocytes Few     Minot Cells Few     Toxic Granulation Present    ECG 12 Lead   Result Value Ref Range    Ventricular Rate 56 BPM    Atrial Rate 56 BPM    AR Interval 146 ms    QRS Duration 96 ms    QT Interval 436 ms    QTC Calculation(Bazett) 420 ms    P Axis 53 degrees    R Axis -10 degrees    T Axis -51 degrees    QRS Count 9 beats    Q Onset 219 ms    P Onset 146 ms    P Offset 208 ms    T Offset 437 ms    QTC Fredericia 426 ms   POCT GLUCOSE   Result Value Ref Range    POCT Glucose 179 (H) 74 - 99 mg/dL        Scheduled medications  apixaban, 2.5 mg, oral, q12h  atorvastatin, 40 mg, oral, Daily  fludrocortisone, 0.1 mg, oral, Daily  tiotropium, 2 puff, inhalation, Daily   And  fluticasone furoate-vilanteroL, 1 puff, inhalation, Daily  folic acid, 1 mg, oral, Daily  hydrocortisone sodium succinate, 50 mg, intravenous, q8h  insulin glargine, 10 Units, subcutaneous, q AM  insulin lispro, 0-15 Units, subcutaneous, TID with meals  levETIRAcetam, 500 mg, oral, BID  lidocaine, 5 mL, infiltration, Once  lidocaine, 5 mL, infiltration, Once  melatonin, 5 mg, oral, Nightly  multivitamin with minerals, 1 tablet, oral, Daily  mycophenolate, 1,000 mg, oral, BID  OLANZapine zydis, 5 mg, oral, Nightly  [Held by provider] predniSONE, 15 mg, oral, Daily  thiamine, 100 mg, oral, Daily      Continuous medications     PRN medications  PRN medications: acetaminophen **OR** acetaminophen, alteplase, dextrose, dextrose, glucagon, hydrALAZINE, ipratropium-albuteroL, ipratropium-albuteroL, ondansetron, oxygen     ECG 12 Lead    Result Date: 5/14/2024  Sinus bradycardia Possible Left atrial enlargement Left ventricular hypertrophy Nonspecific ST abnormality Abnormal ECG When compared with ECG of 13-MAY-2024 06:33, (unconfirmed) No significant change was found    Bedside Midline Imaging    Result Date:  5/13/2024  These images are not reportable by radiology and will not be interpreted by  Radiologists.    ECG 12 Lead    Result Date: 5/13/2024  Sinus bradycardia Voltage criteria for left ventricular hypertrophy Abnormal ECG When compared with ECG of 12-MAY-2024 06:38, Sinus rhythm has replaced Atrial fibrillation Nonspecific T wave abnormality no longer evident in Lateral leads    ECG 12 Lead    Result Date: 5/12/2024  Atrial fibrillation Voltage criteria for left ventricular hypertrophy ST & T wave abnormality, consider inferior ischemia Abnormal ECG When compared with ECG of 12-MAY-2024 01:59, (unconfirmed) No significant change was found Confirmed by Antonio Rodriges ([id number]) on 5/12/2024 10:06:57 AM    ECG 12 Lead    Result Date: 5/12/2024  Atrial fibrillation Moderate voltage criteria for LVH, may be normal variant Nonspecific ST and T wave abnormality Abnormal ECG When compared with ECG of 11-MAY-2024 07:08, Atrial fibrillation has replaced Sinus rhythm Non-specific change in ST segment in Lateral leads Confirmed by Antonio Rodriges ([id number]) on 5/12/2024 10:06:48 AM    MR brain w and wo IV contrast    Result Date: 5/11/2024  Interpreted By:  Emelia George, STUDY: MR BRAIN W AND WO IV CONTRAST; 5/11/2024 5:51 pm   INDICATION: Signs/Symptoms:seizure, change in MS.   COMPARISON: CT head from 05/07/2024   ACCESSION NUMBER(S): [id number]   ORDERING CLINICIAN: VINCENT LOPEZ   TECHNIQUE: Axial T2, FLAIR, DWI, gradient echo T2 and T1 weighted images of brain were acquired. Post contrast T1 weighted images were acquired after administration of  gadolinium based intravenous contrast.   FINDINGS: There is no diffusion restriction abnormality to suggest acute infarct.   There is an area of encephalomalacia within the left occipital lobe with patchy hemosiderin deposition.   There are patchy areas of T2 and FLAIR hyperintense signal within bilateral periventricular and subcortical white matter which likely reflect sequela of  chronic small vessel ischemic change. Another small area of cortical encephalomalacia is noted within the parasagittal posterior right frontal lobe as well as within the left parietal lobe.   Minimal nonspecific white matter changes are noted within the ryan. 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   Postcontrast imaging demonstrates no focus of abnormal parenchymal or leptomeningeal enhancement.   Visualized paranasal sinuses are clear. Mild opacification of bilateral mastoid air cells is noted.       No evidence of acute infarct, intracranial mass effect or midline shift. Multiple chronic findings as detailed.   Signed by: Emelia George 5/11/2024 6:44 PM Dictation workstation:   [id number]    ECG 12 Lead    Result Date: 5/11/2024  Normal sinus rhythm Moderate voltage criteria for LVH, may be normal variant T wave abnormality, consider inferior ischemia Abnormal ECG When compared with ECG of 10-MAY-2024 10:52, (unconfirmed) ST no longer depressed in Inferior leads Nonspecific T wave abnormality now evident in Anterolateral leads Confirmed by Antonio Rodriges ([id number]) on 5/11/2024 10:13:04 AM    ECG 12 Lead    Result Date: 5/11/2024  Sinus bradycardia Moderate voltage criteria for LVH, may be normal variant Borderline ECG When compared with ECG of 09-MAY-2024 09:01, Nonspecific T wave abnormality no longer evident in Anterior leads Confirmed by Antonio Rodriges ([id number]) on 5/11/2024 10:12:50 AM    Transthoracic Echo (TTE) Limited    Result Date: 5/10/2024          [address] [address]  Tel [phone] Fax [phone] TRANSTHORACIC ECHOCARDIOGRAM REPORT  Patient Name:      LISBET Longoria Physician:    [id number] Bacilio Sommers DO Study Date:        5/10/2024             Ordering Provider:    [id number] SAMANTHA ANGELES  MRN/PID:           [id number]              Fellow: Accession#:        [id number]          Nurse: Date of Birth/Age: 1960 / 63 years Sonographer:          Shahana Osullivan RDCS Gender:            F                     Additional Staff: Height:            165.10 cm             Admit Date:           5/7/2024 Weight:            102.06 kg             Admission Status:     Inpatient -                                                                Routine BSA / BMI:         2.08 m2 / 37.44 kg/m2 Department Location:  [facility] Blood Pressure: 167 /53 mmHg Study Type:    TRANSTHORACIC ECHO (TTE) LIMITED Diagnosis/ICD: Bradycardia, unspecified-R00.1 Indication:    Abnormal EKG CPT Codes:     Echo Limited-28767; Color Doppler-90032; Doppler Limited-53237 Patient History: Pertinent History: HTN, Hyperlipidemia, A-Fib, Cardiomyopathy and Lupus. Study Detail: The following Echo studies were performed: 2D, M-Mode, Doppler and               color flow. Technically challenging study due to patient lying in               supine position and the patient's lack of cooperation.  PHYSICIAN INTERPRETATION: Left Ventricle: Left ventricular systolic function is mildly to moderately decreased, with an estimated ejection fraction of 40-45%. There is global hypokinesis of the left ventricle with minor regional variations. The left ventricular cavity size is normal. There is moderate concentric left ventricular hypertrophy. Left ventricular diastolic filling was not assessed. Left Atrium: The left atrium is upper limits of normal in size. Right Ventricle: The right ventricle is normal in size. There is normal right ventricular global systolic function. Right Atrium: The right atrium is normal in size. Aortic Valve: The aortic valve appears structurally normal. The aortic valve appears tricuspid. There is no  evidence of aortic valve stenosis. There is no evidence of aortic valve regurgitation. Mitral Valve: The mitral valve is normal in structure. There is no evidence of mitral valve stenosis. There is normal mitral valve leaflet mobility. There is mild to moderate mitral valve regurgitation. Tricuspid Valve: The tricuspid valve is structurally normal. There is normal tricuspid valve leaflet mobility. There is mild tricuspid regurgitation. Pulmonic Valve: The pulmonic valve is structurally normal. There is no indication of pulmonic valve regurgitation. Pericardium: There is no pericardial effusion noted. Aorta: The aortic root is normal. Pulmonary Artery: The pulmonary artery is not well visualized. Pulmonary hypertension is present. The tricuspid regurgitant velocity is 3.79 m/s, and with an estimated right atrial pressure of 3 mmHg, the estimated pulmonary artery pressure is severely elevated with the RVSP at 60.5 mmHg. Systemic Veins: The inferior vena cava was not well visualized. In comparison to the previous echocardiogram(s): Prior examinations are available and were reviewed for comparison purposes. The left ventricular function is unchanged. The left ventricular hypertrophy is worse.  CONCLUSIONS:  1. Left ventricular systolic function is mildly to moderately decreased with a 40-45% estimated ejection fraction.  2. There is moderate concentric left ventricular hypertrophy.  3. There is no evidence of mitral valve stenosis.  4. Mild to moderate mitral valve regurgitation.  5. Mild tricuspid regurgitation is visualized.  6. Aortic valve stenosis is not present.  7. Pulmonary hypertension is present.  8. Severely elevated pulmonary artery pressure.  9. The pulmonary artery is not well visualized. 10. There is global hypokinesis of the left ventricle with minor regional variations. RECOMMENDATIONS: Technically suboptimal and limited study, therefore accuracy of above interpretation could be substantially  diminished. Clinical correlation is advised. Consider additional imaging modalities if clinically indicated.  QUANTITATIVE DATA SUMMARY: 2D MEASUREMENTS:                           Normal Ranges: LAs:           3.90 cm    (2.7-4.0cm) IVSd:          1.32 cm    (0.6-1.1cm) LVPWd:         1.37 cm    (0.6-1.1cm) LVIDd:         4.81 cm    (3.9-5.9cm) LVIDs:         3.87 cm LV Mass Index: 124.5 g/m2 LV % FS        19.5 % LV SYSTOLIC FUNCTION BY 2D PLANIMETRY (MOD):                     Normal Ranges: EF-A4C View: 39.2 % (>=55%) EF-A2C View: 42.1 % EF-Biplane:  40.8 % TRICUSPID VALVE/RVSP:                             Normal Ranges: Peak TR Velocity: 3.79 m/s RV Syst Pressure: 60.5 mmHg (< 30mmHg)  [id number] Bacilio Sommers DO Electronically signed on 5/10/2024 at 10:57:55 PM  ** Final **     XR chest 1 view    Result Date: 5/10/2024  Interpreted By:  Ceci Westbrook, STUDY: XR CHEST 1 VIEW;  5/10/2024 7:00 pm   INDICATION: Signs/Symptoms:Line placement   COMPARISON: Chest x-ray 05/07/2024.   ACCESSION NUMBER(S): [id number]   ORDERING CLINICIAN: GENIE BRIGHT   TECHNIQUE: Portable frontal view of the chest was obtained .   FINDINGS: Monitoring wires and radiopaque densities are overlying the patient.   There is a left IJ central line with the tip overlying the region of the right atrium. There is radiopaque tubing at the soft tissues at the right side of the neck.   The heart is enlarged. Atherosclerotic calcifications are noted at the thoracic aorta. There is vascular congestion. There are bilateral airspace opacities. There are small bilateral pleural effusions. No pneumothorax.   Degenerative changes at the bilateral glenohumeral joints.       1. Left IJ central line with the tip overlying the region of the right atrium. No radiographic evidence for pneumothorax. 2. Radiopaque tubing at the soft tissues of the right side of the neck, may represent a peripherally inserted catheter. Clinical correlation recommended. 3.  Cardiomegaly. Vascular congestion. Bilateral airspace opacities, may be secondary to pulmonary edema and/or pneumonia.       MACRO: None.   Signed by: Ceci Westbrook 5/10/2024 8:06 PM Dictation workstation:   [id number]    FL guided lumbar puncture    Result Date: 5/10/2024  Interpreted By:  Hay Tucker, STUDY: FL GUIDED LUMBAR PUNCTURE;  5/10/2024 1:59 pm   INDICATION: Signs/Symptoms:Encephalopathy, needs LP.   COMPARISON: CT abdomen/pelvis of 04/25/2024.   ACCESSION NUMBER(S): [id number]   ORDERING CLINICIAN: ONELIA GREGORY   FINDINGS: PROCEDURE: After discussion of risks, benefits, and alternatives, oral and written informed consent was obtained.   The patient was placed in prone position on exam table. There were procedure limitations related to limited patient cooperation and agitation. There is also lumbar scoliosis and multilevel degenerative changes of the spine.   The L3-4 interspace was then marked under fluoroscopy. The patient was then prepped and draped in sterile fashion. Local anesthesia was achieved with 2% Lidocaine solution. A 22 gauge spinal needle was then introduced at the L3-L4 level under direct fluoroscopic guidance. There was free return of slightly blood-tinged CSF. Initially approximately 0.5 mL of fluid was able to be collected although the flow of CSF then stopped. Needle was slightly repositioned although additional CSF flow was not able to be achieved. Patient was becoming more agitated and it was decided that the procedure be stopped at this point. The stylet was then replaced and the spinal needle was removed.  Hemostasis was achieved with direct pressure and the area was dressed with a Band-Aid.   Patient was sent back to inpatient floor for routine recovery.   Total fluoroscopy time was 32 seconds.   Total saved images:  1.   The small volume of CSF that was collected was placed in a single sterile vial and submitted for laboratory analysis.       Fluoroscopic guided  lumbar puncture attempt. Only a very small volume of slightly blood-tinged CSF was able to be obtained and then flow of fluid stopped. The small volume of fluid obtained was submitted for laboratory analysis.   MACRO: None.   Signed by: Hay Tucker 5/10/2024 2:10 PM Dictation workstation:   [id number]    EEG    IMPRESSION Impression This vEEG is indicative of moderate diffuse encephalopathy. No epileptiform discharges or lateralizing signs are recorded. Multiple non epileptic head clonic events occurred throughout the recording. A full report will be scanned into the patient's chart at a later time. This report has been interpreted and electronically signed by    ECG 12 Lead    Result Date: 5/9/2024  Marked sinus bradycardia Voltage criteria for left ventricular hypertrophy Abnormal ECG When compared with ECG of 07-MAY-2024 13:54, (unconfirmed) Vent. rate has decreased BY  26 BPM Nonspecific T wave abnormality no longer evident in Lateral leads QT has shortened Confirmed by Antonio Rodriges ([id number]) on 5/9/2024 1:14:57 PM    Electrocardiogram, 12-lead PRN ACS symptoms    Result Date: 5/9/2024  Normal sinus rhythm Voltage criteria for left ventricular hypertrophy Nonspecific ST and T wave abnormality Abnormal ECG When compared with ECG of 26-APR-2024 12:24, QT has lengthened See ED provider note for full interpretation and clinical correlation Confirmed by Antonio Rodriges ([id number]) on 5/9/2024 1:09:51 PM    CT head wo IV contrast    Result Date: 5/7/2024  Interpreted By:  Higinio Sanchez, STUDY: CT HEAD WO IV CONTRAST;  5/7/2024 5:01 pm   INDICATION: Signs/Symptoms:AMS.   COMPARISON: 04/26/2024   ACCESSION NUMBER(S): [id number]   ORDERING CLINICIAN: GRAHAM HENDERSON   TECHNIQUE: Noncontrast axial CT scan of head was performed. Angled reformats in brain and bone windows were generated. The images were reviewed in bone, brain, blood and soft tissue windows.   FINDINGS: The ventricles, cisterns and sulci are prominent,  consistent with mild diffuse volume loss. There are areas of nonspecific white matter hypodensity, which are probably age-related or microvascular in nature.   Gray-white differentiation is intact and there is no evidence of acute cortical infarct. Hypodensity in the left occipital lobe is unchanged and similar the prior exam. No mass, mass effect or midline shift is seen. There is no evidence of hemorrhage.   The visualized paranasal sinuses are clear.         No evidence of acute cortical infarct or intracranial hemorrhage.   Stable chronic changes.   MACRO: None   Signed by: Higinio Sanchez 5/7/2024 5:43 PM Dictation workstation:   [id number]    XR chest 1 view    Result Date: 5/7/2024  Interpreted By:  Nic Moseley, STUDY: XR CHEST 1 VIEW  5/7/2024 1:32 pm   INDICATION: Signs/Symptoms:weak   COMPARISON: 04/24/2024   ACCESSION NUMBER(S): [id number]   ORDERING CLINICIAN: GRAHAM HENDERSON   TECHNIQUE: A single AP portable radiograph of the chest was obtained.   FINDINGS: Mild diffuse interstitial infiltrates are seen bilaterally, and may represent edema and/or pneumonia. No pneumothorax is identified. The cardiac silhouette is mildly prominent, similar to prior studies.       Diffuse interstitial infiltrates bilaterally, as above. Clinical correlation and continued follow-up until clearing is recommended.   MACRO: None.   Signed by: Nic Moseley 5/7/2024 2:07 PM Dictation workstation:   [id number]    ECG 12 lead    Result Date: 4/30/2024  Normal sinus rhythm Possible Left atrial enlargement Left ventricular hypertrophy with repolarization abnormality Abnormal ECG When compared with ECG of 20-MAR-2024 00:59, Vent. rate has increased BY  37 BPM Confirmed by Evaristo Moran ([id number]) on 4/30/2024 1:13:25 PM    US right upper quadrant    Result Date: 4/27/2024  Interpreted By:  Graciela Blevins, STUDY: US RIGHT UPPER QUADRANT;  4/27/2024 5:24 pm   INDICATION: Signs/Symptoms:looking for islet tumor.   COMPARISON: Correlation  with noncontrast CT abdomen pelvis 04/25/2024   ACCESSION NUMBER(S): [id number]   ORDERING CLINICIAN: TU ZAMARRIPA   TECHNIQUE: Grayscale and color Doppler sonographic imaging of the right upper quadrant.   FINDINGS: LIVER: Normal size. Normal echogenicity, and echotexture. No focal abnormalities.   BILE DUCTS: No intrahepatic or extrahepatic bile duct dilatation. Extrahepatic bile duct = 5 mm.   GALLBLADDER: Status post cholecystectomy.   PANCREAS: Normal head and body. Limited evaluation of the pancreatic tail due to bowel gas. No pancreatic mass is seen. Pancreatic duct is within normal limits in size.   RIGHT KIDNEY: Normal size, no hydronephrosis.   PERITONEUM: No upper abdominal ascites.       Unremarkable right upper quadrant ultrasound.   No visualized pancreatic mass. Please note that ultrasound is limited in sensitivity for pancreatic masses and further evaluation with nonemergent pancreas protocol MRI may be considered if clinically indicated.   MACRO: None   Signed by: Graciela Blevins 4/27/2024 6:47 PM Dictation workstation:   [id number]    CT head wo IV contrast    Result Date: 4/26/2024  Interpreted By:  Graciela Blevins, STUDY: CT HEAD WO IV CONTRAST;  4/26/2024 5:14 pm   INDICATION: Signs/Symptoms:Altered mental status.   COMPARISON: 03/20/2024   ACCESSION NUMBER(S): [id number]   ORDERING CLINICIAN: TU ZAMARRIPA   TECHNIQUE: Axial noncontrast CT images of the head.   FINDINGS: BRAIN PARENCHYMA: Stable encephalomalacia in the left occipital lobe. Gray-white matter interfaces are otherwisepreserved. Calcifications are in the basal ganglia.   deep and periventricular white matter hypodensities are nonspecific, but favored to represent chronic small vessel ischemic changes.  No mass effect or midline shift.   HEMORRHAGE: No acute intracranial hemorrhage. VENTRICLES and EXTRA-AXIAL SPACES: The ventricles and sulci are within normal limits in size for brain volume. No abnormal extraaxial fluid  collection. EXTRACRANIAL SOFT TISSUES: Within normal limits. PARANASAL SINUSES/MASTOIDS:  The visualized paranasal sinuses and mastoid air cells are aerated. CALVARIUM: No depressed skull fracture. No destructive osseous lesion.   OTHER FINDINGS: None.       No acute intracranial abnormality.   Stable chronic findings as above.   MACRO: None   Signed by: Graciela Blevins 4/26/2024 6:56 PM Dictation workstation:   [id number]    XR elbow left 3+ views    Result Date: 4/26/2024  Interpreted By:  Jose A Anderson, STUDY: XR ELBOW LEFT 3+ VIEWS; ;  4/26/2024 3:02 pm   INDICATION: Signs/Symptoms:pain.   COMPARISON: None.   ACCESSION NUMBER(S): [id number]   ORDERING CLINICIAN: VINCENT VARELA   FINDINGS: Small anterior and posterior effusions. No definite fracture visualized. No significant soft tissue swelling. No radiopaque foreign body. Mild degenerative changes.       Small effusions without visualized fracture. Mild degenerative changes of the elbow.     MACRO: None   Signed by: Jose A Anderson 4/26/2024 3:41 PM Dictation workstation:   [id number]    CT elbow left wo IV contrast    Result Date: 4/25/2024  Interpreted By:  Graciela Blevins, STUDY: CT ELBOW LEFT WO IV CONTRAST; ;  4/25/2024 8:31 pm   INDICATION: Signs/Symptoms:Self mutilation of antecubital fossa, joint tender to touch, looking for infection.   COMPARISON: None.   ACCESSION NUMBER(S): [id number]   ORDERING CLINICIAN: TU ZAMARRIPA   TECHNIQUE: Noncontrast CT images of the left elbow with axial, sagittal and coronal reconstructed images.   FINDINGS: No acute fracture or malalignment. 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 No erosions or destructive changes. There is a small elbow joint effusion. Otherwise, soft tissues are unremarkable. No soft tissue gas or radiopaque foreign body.       Nonspecific small elbow joint effusion. If there is clinical concern for septic arthritis, joint aspiration is recommended.   Otherwise, soft tissues are  unremarkable.   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     MACRO: None   Signed by: Graciela Blevins 4/25/2024 9:54 PM Dictation workstation:   [id number]    CT abdomen pelvis wo IV contrast    Result Date: 4/25/2024  Interpreted By:  Graciela Blevins, STUDY: CT ABDOMEN PELVIS WO IV CONTRAST;  4/25/2024 8:31 pm   INDICATION: Signs/Symptoms:3 pt drop in hgb, AMS, worsening encephalopathy, hx of secondary adrenal insuficiency and hypoglycemia.   COMPARISON: 03/20/2024   ACCESSION NUMBER(S): [id number]   ORDERING CLINICIAN: TU ZAMARRIPA   TECHNIQUE: Axial noncontrast CT images of the abdomen and pelvis with coronal and sagittal reconstructed images.   FINDINGS: LOWER CHEST: New bilateral lower lobe airspace opacities. Cardiomegaly and coronary artery calcifications noted. BONES: No acute osseous abnormality. Diffuse degenerative disc changes and lumbar facet arthropathy. Right total hip arthroplasty. Severe left hip osteoarthrosis. ABDOMINAL WALL: Within normal limits.   ABDOMEN: Lack of intravenous contrast limits evaluation of vessels and solid organs. LIVER: Within normal limits. BILE DUCTS: No biliary dilatation. GALLBLADDER: Cholecystectomy. PANCREAS: No peripancreatic inflammatory stranding or duct dilatation. SPLEEN: Within normal limits. ADRENALS: Within normal limits.   KIDNEYS, URETERS, URINARY BLADDER: No hydronephrosis. 2 mm nonobstructing right renal calculus. Limited evaluation of the distal ureters due to streak artifact. No hydroureter. Evaluation of the urinary bladder due to streak artifact.   VESSELS: No aortic aneurysm. RETROPERITONEUM: No pathologically enlarged lymph nodes.   PELVIS:   REPRODUCTIVE ORGANS: No abnormality, given limitations of the noncontrast CT.   BOWEL: The stomach is mildly distended. No dilated small bowel. Colonic diverticulosis without acute diverticulitis. Normal appendix. PERITONEUM: No ascites or free air, no fluid collection.       No acute abdominal or pelvic process.    Nonspecific bilateral lower lobe airspace opacities. Please correlate clinically to exclude pneumonia.   MACRO: None   Signed by: Graciela Blevins 4/25/2024 9:51 PM Dictation workstation:   [id number]    Bedside Peripheral IV Imaging    Result Date: 4/25/2024  These images are not reportable by radiology and will not be interpreted by  Radiologists.    Lower extremity venous duplex bilateral    Result Date: 4/25/2024            [facility] [id number] [place] [address]     Tel [phone] Fax [phone]  Vascular Lab Report  [facility] US LOWER EXTREMITY VENOUS DUPLEX BILATERAL Patient Name:      LISBET GUTIERRES      Reading Physician:  [id number] Enzo Ashley MD, RPVI Study Date:        4/25/2024             Ordering Provider:  [id number] HILDA JUAREZ MRN/PID:           [id number]              Fellow: Accession#:        [id number]          Technologist:       Beth Hannah RVT, [facility] [[email or web address]] Date of Birth/Age: 1960 / 63 years Technologist 2: Gender:            F                     Encounter#:         [id number] Admission Status:  Inpatient             Location Performed: [facility]  Diagnosis/ICD: Other specified soft tissue disorders-M79.89 Indication:    Limb swelling CPT Codes:     41683 Peripheral venous duplex scan for DVT complete  Pertinent History: COPD, Anticoagulation, AAA and LE Edema. Afib.  CONCLUSIONS: Right Lower Venous: No evidence of acute deep vein thrombus visualized in the right lower extremity. Left Lower Venous: No evidence of acute deep vein thrombus visualized in the left lower extremity. Additional Findings: Limited images due to patient's inability to tolerate compressions.  Comparison: Compared with study from  1/15/2024, no significant change.No evidence of DVT.  Imaging & Doppler Findings:  Right                 Compressible Thrombus        Flow Distal External Iliac                None   Spontaneous/Phasic CFV                       Yes        None   Spontaneous/Phasic PFV                       Yes        None FV Proximal               Yes        None   Spontaneous/Phasic FV Mid                    Yes        None FV Distal                 Yes        None Popliteal                 Yes        None   Spontaneous/Phasic Peroneal                  Yes        None PTV                       Yes        None  Left                  Compress Thrombus        Flow Distal External Iliac            None   Spontaneous/Phasic CFV                     Yes      None   Spontaneous/Phasic PFV                     Yes      None FV Proximal             Yes      None   Spontaneous/Phasic FV Mid                  Yes      None FV Distal               Yes      None Popliteal               Yes      None   Spontaneous/Phasic Peroneal                Yes      None PTV                     Yes      None  [id number] Enzo Ashley MD, RPVI Electronically signed by [id number] Enzo Ashley MD, RPVI on 4/25/2024 at 1:07:18 PM  ** Final **     XR chest 1 view    Result Date: 4/24/2024  Interpreted By:  Mitesh Khan, STUDY: XR CHEST 1 VIEW;  4/24/2024 5:41 pm   INDICATION: Signs/Symptoms:cough.   COMPARISON: 03/19/2024   ACCESSION NUMBER(S): [id number]   ORDERING CLINICIAN: ENEIDA GRIER   FINDINGS:         CARDIOMEDIASTINAL SILHOUETTE: Cardiomediastinal silhouette is enlarged.   LUNGS: There is interval appearance of a right upper lobe patchy airspace disease concerning for pneumonia. The left lung is clear. There is no effusion   ABDOMEN: No remarkable upper abdominal findings.   BONES: No acute osseous changes.       1.  New right upper lobe airspace disease compatible with pneumonia in the proper clinical setting. Radiographic follow-up to resolution in 3-4  weeks is advised.       MACRO: None   Signed by: Mitesh Khan 4/24/2024 5:50 PM Dictation workstation:   [id number]       Encounter Date: 05/07/24   ECG 12 Lead   Result Value    Ventricular Rate 56    Atrial Rate 56    NE Interval 146    QRS Duration 96    QT Interval 436    QTC Calculation(Bazett) 420    P Axis 53    R Axis -10    T Axis -51    QRS Count 9    Q Onset 219    P Onset 146    P Offset 208    T Offset 437    QTC Fredericia 426    Narrative    Sinus bradycardia  Possible Left atrial enlargement  Left ventricular hypertrophy  Nonspecific ST abnormality  Abnormal ECG  When compared with ECG of 13-MAY-2024 06:33, (unconfirmed)  No significant change was found        Tele Monitoring:SB/SR with HR 37-85 BPM    Assessment     Patient Active Problem List   Diagnosis    COVID-19    Lupus (Multi)    Ambulatory dysfunction    Myelodysplastic syndrome, unspecified (Multi)    Lupus vasculitis (Multi)    Hyperlipidemia    Pneumonia of both lower lobes due to infectious organism    Hallucinations    Leg swelling    Hyperglycemia    JED (acute kidney injury) (CMS-[facility])    Hypernatremia    Proliferative diabetic retinopathy of right eye without macular edema determined by examination associated with type 2 diabetes mellitus (Multi)    Urinary incontinence    Paraparesis (Multi)    Abdominal cramping    Abnormal echocardiogram    Abnormal gait    Abnormal thyroid blood test    Advanced COPD (Multi)    Afferent pupillary defect of right eye    Anemia    Pancytopenia (Multi)    Altitudinal scotoma of right eye    Arcuate scotoma of left eye    Arthropathy    Asymptomatic PVCs    PAF (paroxysmal atrial fibrillation) (Multi)    Balance problem    Bilateral high frequency sensorineural hearing loss    Bradycardia    Branch retinal artery occlusion    Cardiomyopathy (Multi)    Chronic diarrhea    Chronic fatigue    Chronic kidney disease (CKD), stage III (moderate) (Multi)    CKD stage G3a/A2, GFR 45-59 and albumin  creatinine ratio  mg/g (Multi)    Combined forms of age-related cataract of left eye    Combined forms of age-related cataract of right eye    Contracture of hand    Snoring    CVA (cerebral vascular accident) (Multi)    Daytime somnolence    Degeneration of lumbar/lumbosacral disc without myelopathy    Depression, major    Dizziness    Dupuytren's contracture    Eczema    Elevated alkaline phosphatase level    Encephalopathy acute    Facial twitching    Fibromyalgia    Fungal nail infection    GERD (gastroesophageal reflux disease)    Gouty arthropathy    H/O iritis    H/O orthostatic hypotension    Hereditary elliptocytosis (CMS-HCC)    High level of cardiac marker    Hip hematoma, right    Hypothermia    Insomnia    Leg edema, left    Loss of consciousness (Multi)    Low blood sugar reading    Lung nodule, multiple    Lupus nephritis (Multi)    Metabolic encephalopathy    Muscle cramps    Nodule of skin of left lower leg    Obstructive lung disease (Multi)    Osteoarthritis of left hand    Osteoarthritis of right hand    Osteopenia    Osteoporosis    Palpitations    Peripheral visual field defect    Persistent proteinuria    Positive colorectal cancer screening using Cologuard test    Benign essential HTN    Psychosis (Multi)    Pulmonary hypertension (Multi)    Refractive error    Hypertensive retinopathy    Retinal neovascularization    Secondary pulmonary arterial hypertension (Multi)    Shortness of breath on exertion    Spinal stenosis of lumbar region with neurogenic claudication    Subjective tinnitus of both ears    Swelling of right foot    Syncope and collapse    Thrombophlebitis of superficial veins of left lower extremity    Tinnitus of left ear    Vitamin D deficiency    DM type 2 with diabetic peripheral neuropathy (Multi)    Systemic lupus erythematosus (Multi)    Diabetic nephropathy (Multi)    Functional diarrhea    UTI (urinary tract infection)    Moderate protein-calorie malnutrition  (Multi)    Shock, unspecified (Multi)    Hyperkalemia    Adrenal insufficiency (Multi)    Seizure-like activity (Multi)    Meningitis ([facility]-H[facility])    Encephalopathy    Seizure (Multi)    Uncontrolled blood glucose    Cervical spondylosis with myelopathy    COPD (chronic obstructive pulmonary disease) (Multi)    Encounter for diabetes education    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  Marked sinus bradycardia  2.  Hypertension  3.  Lupus  4.  Chronic kidney disease  5.  Anemia  6.  History of coronary artery disease with coronary artery bypass graft surgery in 2013  7.  History of atrial fibrillation on Eliquis therapy  8.  History of adrenal insufficiency  9.  Cardiomyopathy with a left ventricular ejection fraction of 40% per echocardiogram in March/2024 - repeat echo 5/10/24 shows LVEF 40-45% with moderate LVH  10. Pulmonary artery HTN with severly elevated PAP (60.5 mmhg)  Plan:  Patient would benefit from PPM in future when clinically stable  Outpatient follow up with EP service for eventual dual chamber PPM implant  Continue to monitor on telemetry  If recurrent atrial fibrillation would place patient back on Eliquis        Electronically signed by ASIM Zaman on 5/14/2024 at 10:43 AM    Patient was seen, chart reviewed.    Patient is more alert.  Telemetry shows sinus rhythm  Long conversation with patient we will plan to follow.  Patient will benefit of a dual-chamber pacemaker implantation  during this admission when she is more stable.  Hold Eliquis for now  Continue telemetry.  Most likely pacemaker early next week    Antonio Rodriges MD

## 2024-05-14 NOTE — PROGRESS NOTES
Met with patient at bedside,  pt more alert and appropriate to discuss dc planning.  Pt is agreeable to return to [facility] LTC once medically able.  Pt is LTC bed  old and can return, no precert needed . Updates sent per request via [facility]       Sheri Phan RN

## 2024-05-15 ENCOUNTER — APPOINTMENT (OUTPATIENT)
Dept: CARDIOLOGY | Facility: HOSPITAL | Age: 64
DRG: 981 | End: 2024-05-15
Payer: MEDICARE

## 2024-05-15 ENCOUNTER — APPOINTMENT (OUTPATIENT)
Dept: RADIOLOGY | Facility: HOSPITAL | Age: 64
DRG: 981 | End: 2024-05-15
Payer: MEDICARE

## 2024-05-15 ENCOUNTER — HOSPITAL ENCOUNTER (INPATIENT)
Dept: CARDIOLOGY | Facility: HOSPITAL | Age: 64
Discharge: HOME | DRG: 981 | End: 2024-05-15
Payer: MEDICARE

## 2024-05-15 VITALS
SYSTOLIC BLOOD PRESSURE: 166 MMHG | RESPIRATION RATE: 16 BRPM | HEART RATE: 102 BPM | DIASTOLIC BLOOD PRESSURE: 86 MMHG | TEMPERATURE: 95.9 F | OXYGEN SATURATION: 94 %

## 2024-05-15 LAB
ANION GAP SERPL CALC-SCNC: 8 MMOL/L (ref 10–20)
ANION GAP SERPL CALC-SCNC: 9 MMOL/L (ref 10–20)
ATRIAL RATE: 54 BPM
ATRIAL RATE: 56 BPM
ATRIAL RATE: 59 BPM
BASO STIPL BLD QL SMEAR: PRESENT
BASOPHILS # BLD MANUAL: 0 X10*3/UL (ref 0–0.1)
BASOPHILS NFR BLD MANUAL: 0 %
BUN SERPL-MCNC: 21 MG/DL (ref 6–23)
BUN SERPL-MCNC: 22 MG/DL (ref 6–23)
BURR CELLS BLD QL SMEAR: ABNORMAL
C NEOFORM AB TITR SER AGGL: NEGATIVE {TITER}
CALCIUM SERPL-MCNC: 7.8 MG/DL (ref 8.6–10.3)
CALCIUM SERPL-MCNC: 7.8 MG/DL (ref 8.6–10.3)
CHLORIDE SERPL-SCNC: 109 MMOL/L (ref 98–107)
CHLORIDE SERPL-SCNC: 110 MMOL/L (ref 98–107)
CO2 SERPL-SCNC: 33 MMOL/L (ref 21–32)
CO2 SERPL-SCNC: 34 MMOL/L (ref 21–32)
CREAT SERPL-MCNC: 1.44 MG/DL (ref 0.5–1.05)
CREAT SERPL-MCNC: 1.47 MG/DL (ref 0.5–1.05)
DACRYOCYTES BLD QL SMEAR: ABNORMAL
EGFRCR SERPLBLD CKD-EPI 2021: 40 ML/MIN/1.73M*2
EGFRCR SERPLBLD CKD-EPI 2021: 41 ML/MIN/1.73M*2
EOSINOPHIL # BLD MANUAL: 0 X10*3/UL (ref 0–0.7)
EOSINOPHIL NFR BLD MANUAL: 0 %
ERYTHROCYTE [DISTWIDTH] IN BLOOD BY AUTOMATED COUNT: 18.1 % (ref 11.5–14.5)
GLUCOSE BLD MANUAL STRIP-MCNC: 122 MG/DL (ref 74–99)
GLUCOSE BLD MANUAL STRIP-MCNC: 205 MG/DL (ref 74–99)
GLUCOSE BLD MANUAL STRIP-MCNC: 207 MG/DL (ref 74–99)
GLUCOSE BLD MANUAL STRIP-MCNC: 260 MG/DL (ref 74–99)
GLUCOSE SERPL-MCNC: 121 MG/DL (ref 74–99)
GLUCOSE SERPL-MCNC: 336 MG/DL (ref 74–99)
HCT VFR BLD AUTO: 25.6 % (ref 36–46)
HGB BLD-MCNC: 7.9 G/DL (ref 12–16)
IMM GRANULOCYTES # BLD AUTO: 1.59 X10*3/UL (ref 0–0.7)
IMM GRANULOCYTES NFR BLD AUTO: 11.8 % (ref 0–0.9)
LYMPHOCYTES # BLD MANUAL: 0.13 X10*3/UL (ref 1.2–4.8)
LYMPHOCYTES NFR BLD MANUAL: 1 %
MAGNESIUM SERPL-MCNC: 1.7 MG/DL (ref 1.6–2.4)
MCH RBC QN AUTO: 30.3 PG (ref 26–34)
MCHC RBC AUTO-ENTMCNC: 30.9 G/DL (ref 32–36)
MCV RBC AUTO: 98 FL (ref 80–100)
METAMYELOCYTES # BLD MANUAL: 0.13 X10*3/UL
METAMYELOCYTES NFR BLD MANUAL: 1 %
MONOCYTES # BLD MANUAL: 0.27 X10*3/UL (ref 0.1–1)
MONOCYTES NFR BLD MANUAL: 2 %
MYELOCYTES # BLD MANUAL: 0.4 X10*3/UL
MYELOCYTES NFR BLD MANUAL: 3 %
NEUTROPHILS # BLD MANUAL: 12.46 X10*3/UL (ref 1.2–7.7)
NEUTS BAND # BLD MANUAL: 0.67 X10*3/UL (ref 0–0.7)
NEUTS BAND NFR BLD MANUAL: 5 %
NEUTS SEG # BLD MANUAL: 11.79 X10*3/UL (ref 1.2–7)
NEUTS SEG NFR BLD MANUAL: 88 %
NRBC BLD-RTO: 3.8 /100 WBCS (ref 0–0)
OVALOCYTES BLD QL SMEAR: ABNORMAL
P AXIS: 53 DEGREES
P AXIS: 54 DEGREES
P AXIS: 54 DEGREES
P OFFSET: 200 MS
P OFFSET: 205 MS
P OFFSET: 208 MS
P ONSET: 142 MS
P ONSET: 146 MS
P ONSET: 149 MS
PLATELET # BLD AUTO: 84 X10*3/UL (ref 150–450)
POLYCHROMASIA BLD QL SMEAR: ABNORMAL
POTASSIUM SERPL-SCNC: 3.2 MMOL/L (ref 3.5–5.3)
POTASSIUM SERPL-SCNC: 3.4 MMOL/L (ref 3.5–5.3)
PR INTERVAL: 132 MS
PR INTERVAL: 146 MS
PR INTERVAL: 150 MS
Q ONSET: 215 MS
Q ONSET: 217 MS
Q ONSET: 219 MS
QRS COUNT: 10 BEATS
QRS COUNT: 8 BEATS
QRS COUNT: 9 BEATS
QRS DURATION: 106 MS
QRS DURATION: 96 MS
QRS DURATION: 96 MS
QT INTERVAL: 436 MS
QT INTERVAL: 438 MS
QT INTERVAL: 454 MS
QTC CALCULATION(BAZETT): 420 MS
QTC CALCULATION(BAZETT): 430 MS
QTC CALCULATION(BAZETT): 433 MS
QTC FREDERICIA: 426 MS
QTC FREDERICIA: 435 MS
QTC FREDERICIA: 438 MS
R AXIS: -10 DEGREES
R AXIS: -10 DEGREES
R AXIS: -13 DEGREES
RBC # BLD AUTO: 2.61 X10*6/UL (ref 4–5.2)
RBC MORPH BLD: ABNORMAL
SODIUM SERPL-SCNC: 148 MMOL/L (ref 136–145)
SODIUM SERPL-SCNC: 149 MMOL/L (ref 136–145)
T AXIS: -26 DEGREES
T AXIS: -40 DEGREES
T AXIS: -51 DEGREES
T OFFSET: 436 MS
T OFFSET: 437 MS
T OFFSET: 442 MS
TOTAL CELLS COUNTED BLD: 100
VENTRICULAR RATE: 54 BPM
VENTRICULAR RATE: 56 BPM
VENTRICULAR RATE: 59 BPM
WBC # BLD AUTO: 13.4 X10*3/UL (ref 4.4–11.3)

## 2024-05-15 PROCEDURE — 2500000001 HC RX 250 WO HCPCS SELF ADMINISTERED DRUGS (ALT 637 FOR MEDICARE OP)

## 2024-05-15 PROCEDURE — 99233 SBSQ HOSP IP/OBS HIGH 50: CPT | Performed by: INTERNAL MEDICINE

## 2024-05-15 PROCEDURE — 80048 BASIC METABOLIC PNL TOTAL CA: CPT | Mod: 91 | Performed by: NURSE PRACTITIONER

## 2024-05-15 PROCEDURE — 2500000001 HC RX 250 WO HCPCS SELF ADMINISTERED DRUGS (ALT 637 FOR MEDICARE OP): Performed by: INTERNAL MEDICINE

## 2024-05-15 PROCEDURE — 2500000004 HC RX 250 GENERAL PHARMACY W/ HCPCS (ALT 636 FOR OP/ED)

## 2024-05-15 PROCEDURE — 93010 ELECTROCARDIOGRAM REPORT: CPT | Performed by: INTERNAL MEDICINE

## 2024-05-15 PROCEDURE — 2500000001 HC RX 250 WO HCPCS SELF ADMINISTERED DRUGS (ALT 637 FOR MEDICARE OP): Performed by: EMERGENCY MEDICINE

## 2024-05-15 PROCEDURE — 82947 ASSAY GLUCOSE BLOOD QUANT: CPT

## 2024-05-15 PROCEDURE — 1210000001 HC SEMI-PRIVATE ROOM DAILY

## 2024-05-15 PROCEDURE — 80048 BASIC METABOLIC PNL TOTAL CA: CPT

## 2024-05-15 PROCEDURE — 36600 WITHDRAWAL OF ARTERIAL BLOOD: CPT | Performed by: NURSE PRACTITIONER

## 2024-05-15 PROCEDURE — 82947 ASSAY GLUCOSE BLOOD QUANT: CPT | Mod: 91

## 2024-05-15 PROCEDURE — 2500000004 HC RX 250 GENERAL PHARMACY W/ HCPCS (ALT 636 FOR OP/ED): Performed by: NURSE PRACTITIONER

## 2024-05-15 PROCEDURE — 85007 BL SMEAR W/DIFF WBC COUNT: CPT

## 2024-05-15 PROCEDURE — 85027 COMPLETE CBC AUTOMATED: CPT

## 2024-05-15 PROCEDURE — 2500000006 HC RX 250 W HCPCS SELF ADMINISTERED DRUGS (ALT 637 FOR ALL PAYERS): Performed by: NURSE PRACTITIONER

## 2024-05-15 PROCEDURE — 93005 ELECTROCARDIOGRAM TRACING: CPT

## 2024-05-15 PROCEDURE — 2500000004 HC RX 250 GENERAL PHARMACY W/ HCPCS (ALT 636 FOR OP/ED): Performed by: INTERNAL MEDICINE

## 2024-05-15 PROCEDURE — 83735 ASSAY OF MAGNESIUM: CPT

## 2024-05-15 PROCEDURE — 37799 UNLISTED PX VASCULAR SURGERY: CPT

## 2024-05-15 PROCEDURE — 2500000002 HC RX 250 W HCPCS SELF ADMINISTERED DRUGS (ALT 637 FOR MEDICARE OP, ALT 636 FOR OP/ED)

## 2024-05-15 PROCEDURE — 2500000005 HC RX 250 GENERAL PHARMACY W/O HCPCS: Performed by: EMERGENCY MEDICINE

## 2024-05-15 PROCEDURE — 2500000006 HC RX 250 W HCPCS SELF ADMINISTERED DRUGS (ALT 637 FOR ALL PAYERS): Performed by: INTERNAL MEDICINE

## 2024-05-15 PROCEDURE — 2500000004 HC RX 250 GENERAL PHARMACY W/ HCPCS (ALT 636 FOR OP/ED): Performed by: EMERGENCY MEDICINE

## 2024-05-15 PROCEDURE — 99291 CRITICAL CARE FIRST HOUR: CPT

## 2024-05-15 RX ORDER — DEXTROSE MONOHYDRATE 50 MG/ML
100 INJECTION, SOLUTION INTRAVENOUS CONTINUOUS
Status: DISCONTINUED | OUTPATIENT
Start: 2024-05-15 | End: 2024-05-16

## 2024-05-15 RX ORDER — HEPARIN SODIUM 5000 [USP'U]/ML
5000 INJECTION, SOLUTION INTRAVENOUS; SUBCUTANEOUS EVERY 8 HOURS
Status: DISCONTINUED | OUTPATIENT
Start: 2024-05-15 | End: 2024-05-18 | Stop reason: HOSPADM

## 2024-05-15 RX ORDER — INSULIN GLARGINE 100 [IU]/ML
10 INJECTION, SOLUTION SUBCUTANEOUS EVERY MORNING
Status: DISCONTINUED | OUTPATIENT
Start: 2024-05-16 | End: 2024-05-18 | Stop reason: HOSPADM

## 2024-05-15 RX ORDER — POTASSIUM CHLORIDE 20 MEQ/1
40 TABLET, EXTENDED RELEASE ORAL
Status: DISCONTINUED | OUTPATIENT
Start: 2024-05-15 | End: 2024-05-15

## 2024-05-15 RX ORDER — POTASSIUM CHLORIDE 20 MEQ/1
40 TABLET, EXTENDED RELEASE ORAL ONCE
Status: COMPLETED | OUTPATIENT
Start: 2024-05-15 | End: 2024-05-15

## 2024-05-15 RX ORDER — MAGNESIUM SULFATE HEPTAHYDRATE 40 MG/ML
2 INJECTION, SOLUTION INTRAVENOUS ONCE
Status: COMPLETED | OUTPATIENT
Start: 2024-05-15 | End: 2024-05-15

## 2024-05-15 RX ORDER — INSULIN GLARGINE 100 [IU]/ML
5 INJECTION, SOLUTION SUBCUTANEOUS ONCE
Status: COMPLETED | OUTPATIENT
Start: 2024-05-15 | End: 2024-05-15

## 2024-05-15 RX ADMIN — POTASSIUM CHLORIDE 40 MEQ: 1500 TABLET, EXTENDED RELEASE ORAL at 16:23

## 2024-05-15 RX ADMIN — OLANZAPINE 5 MG: 10 TABLET, ORALLY DISINTEGRATING ORAL at 20:46

## 2024-05-15 RX ADMIN — Medication 100 MG: at 08:05

## 2024-05-15 RX ADMIN — INSULIN GLARGINE 5 UNITS: 100 INJECTION, SOLUTION SUBCUTANEOUS at 12:16

## 2024-05-15 RX ADMIN — Medication 5 MG: at 20:46

## 2024-05-15 RX ADMIN — ATORVASTATIN CALCIUM 40 MG: 20 TABLET, FILM COATED ORAL at 20:47

## 2024-05-15 RX ADMIN — HEPARIN SODIUM 5000 UNITS: 5000 INJECTION INTRAVENOUS; SUBCUTANEOUS at 16:23

## 2024-05-15 RX ADMIN — FOLIC ACID 1 MG: 1 TABLET ORAL at 08:05

## 2024-05-15 RX ADMIN — INSULIN GLARGINE 5 UNITS: 100 INJECTION, SOLUTION SUBCUTANEOUS at 08:34

## 2024-05-15 RX ADMIN — INSULIN LISPRO 9 UNITS: 100 INJECTION, SOLUTION INTRAVENOUS; SUBCUTANEOUS at 08:35

## 2024-05-15 RX ADMIN — FLUTICASONE FUROATE AND VILANTEROL TRIFENATATE 1 PUFF: 200; 25 POWDER RESPIRATORY (INHALATION) at 08:18

## 2024-05-15 RX ADMIN — DEXTROSE MONOHYDRATE 100 ML/HR: 50 INJECTION, SOLUTION INTRAVENOUS at 08:03

## 2024-05-15 RX ADMIN — Medication 2 L/MIN: at 13:00

## 2024-05-15 RX ADMIN — HYDROCORTISONE SODIUM SUCCINATE 50 MG: 100 INJECTION, POWDER, FOR SOLUTION INTRAMUSCULAR; INTRAVENOUS at 20:46

## 2024-05-15 RX ADMIN — LEVETIRACETAM 500 MG: 500 TABLET, FILM COATED ORAL at 20:47

## 2024-05-15 RX ADMIN — TIOTROPIUM BROMIDE INHALATION SPRAY 2 PUFF: 3.12 SPRAY, METERED RESPIRATORY (INHALATION) at 08:18

## 2024-05-15 RX ADMIN — MYCOPHENOLATE MOFETIL 1000 MG: 250 CAPSULE ORAL at 08:05

## 2024-05-15 RX ADMIN — HEPARIN SODIUM 5000 UNITS: 5000 INJECTION INTRAVENOUS; SUBCUTANEOUS at 08:39

## 2024-05-15 RX ADMIN — MAGNESIUM SULFATE HEPTAHYDRATE 2 G: 40 INJECTION, SOLUTION INTRAVENOUS at 08:06

## 2024-05-15 RX ADMIN — POTASSIUM CHLORIDE 40 MEQ: 1500 TABLET, EXTENDED RELEASE ORAL at 08:05

## 2024-05-15 RX ADMIN — ATOVAQUONE 1500 MG: 750 SUSPENSION ORAL at 08:04

## 2024-05-15 RX ADMIN — LEVETIRACETAM 500 MG: 500 TABLET, FILM COATED ORAL at 08:05

## 2024-05-15 RX ADMIN — MYCOPHENOLATE MOFETIL 1000 MG: 250 CAPSULE ORAL at 20:46

## 2024-05-15 RX ADMIN — HYDROCORTISONE SODIUM SUCCINATE 50 MG: 100 INJECTION, POWDER, FOR SOLUTION INTRAMUSCULAR; INTRAVENOUS at 08:05

## 2024-05-15 RX ADMIN — Medication 1 TABLET: at 08:05

## 2024-05-15 RX ADMIN — DEXTROSE MONOHYDRATE 100 ML/HR: 50 INJECTION, SOLUTION INTRAVENOUS at 18:52

## 2024-05-15 RX ADMIN — FLUDROCORTISONE ACETATE 0.1 MG: 0.1 TABLET ORAL at 08:05

## 2024-05-15 RX ADMIN — INSULIN LISPRO 6 UNITS: 100 INJECTION, SOLUTION INTRAVENOUS; SUBCUTANEOUS at 12:16

## 2024-05-15 ASSESSMENT — COGNITIVE AND FUNCTIONAL STATUS - GENERAL
PERSONAL GROOMING: A LOT
PERSONAL GROOMING: A LOT
DAILY ACTIVITIY SCORE: 13
MOVING TO AND FROM BED TO CHAIR: TOTAL
MOBILITY SCORE: 8
STANDING UP FROM CHAIR USING ARMS: TOTAL
STANDING UP FROM CHAIR USING ARMS: TOTAL
DRESSING REGULAR UPPER BODY CLOTHING: A LOT
TOILETING: TOTAL
MOBILITY SCORE: 8
MOVING TO AND FROM BED TO CHAIR: TOTAL
DRESSING REGULAR UPPER BODY CLOTHING: A LOT
HELP NEEDED FOR BATHING: A LOT
DAILY ACTIVITIY SCORE: 13
CLIMB 3 TO 5 STEPS WITH RAILING: TOTAL
TOILETING: TOTAL
CLIMB 3 TO 5 STEPS WITH RAILING: TOTAL
TURNING FROM BACK TO SIDE WHILE IN FLAT BAD: A LOT
DRESSING REGULAR LOWER BODY CLOTHING: A LOT
DRESSING REGULAR LOWER BODY CLOTHING: A LOT
TURNING FROM BACK TO SIDE WHILE IN FLAT BAD: A LOT
MOVING FROM LYING ON BACK TO SITTING ON SIDE OF FLAT BED WITH BEDRAILS: A LOT
MOVING FROM LYING ON BACK TO SITTING ON SIDE OF FLAT BED WITH BEDRAILS: A LOT
WALKING IN HOSPITAL ROOM: TOTAL
WALKING IN HOSPITAL ROOM: TOTAL
HELP NEEDED FOR BATHING: A LOT

## 2024-05-15 ASSESSMENT — PAIN - FUNCTIONAL ASSESSMENT
PAIN_FUNCTIONAL_ASSESSMENT: 0-10

## 2024-05-15 ASSESSMENT — PAIN SCALES - GENERAL
PAINLEVEL_OUTOF10: 0 - NO PAIN

## 2024-05-15 NOTE — CARE PLAN
The patient's goals for the shift include      The clinical goals for the shift include pt will remain hemodynimcally stable throughout shift    Over the shift, the patient did not make progress toward the following goals. Barriers to progression include . Recommendations to address these barriers include .

## 2024-05-15 NOTE — PROGRESS NOTES
"    Cardiology Progress Note  Patient: Bing Holliday  Unit/Bed: 07/07-A  YOB: 1960  MRN: [id number]  Acct: [id number]   Admitting Diagnosis:   Disorientation [R41.0]  Acute exacerbation of chronic obstructive pulmonary disease (COPD) (Multi) [J44.1]  Hypothermia, initial encounter [T68.XXXA]  Acute pneumonia [J18.9]  Date:  5/7/2024  Hospital Day: 8  Attending: Pranay Martinez MD   Rounding CRISTINA/Cardiologist:  Kiara Frankel, APRN-CNP, Dr. Antonio Rodriges    Primary Cardiologist: Dr. Antonio Rodriges      Complaint:  Chief Complaint   Patient presents with    Altered Mental Status        SUBJECTIVE    Resting in bed, denies palpitations, dizziness, or lightheadedness  Telemetry shows SB/SR with HR  BPM - no further episodes of PAF      VITALS   Visit Vitals  /75   Pulse 54   Temp 35.6 °C (96.1 °F) (Temporal)   Resp 17        I/O:    Intake/Output Summary (Last 24 hours) at 5/15/2024 1157  Last data filed at 5/15/2024 1100  Gross per 24 hour   Intake 3922 ml   Output 5450 ml   Net -1528 ml        Allergies:  Allergies   Allergen Reactions    Ace Inhibitors Swelling, Angioedema, Shortness of breath and Other     'FACIAL TWISTING\" \"LOOKS LIKE I HAD A STROKE IN MY SLEEP\"    Hydroxychloroquine Other, Nausea And Vomiting, Nausea/vomiting and Unknown     RETINAL BLEEDING    RETINAL BLEEDING      RETINAL BLEEDING, Eye problems    Lisinopril Swelling    Penicillins Unknown     tolerates cephalosporins-unknown childhood allergy    Sulfa (Sulfonamide Antibiotics) Hives        PHYSICAL EXAM   Physical Exam  Constitutional:       Appearance: She is ill-appearing.   HENT:      Head: Normocephalic and atraumatic.      Nose: Nose normal.      Mouth/Throat:      Mouth: Mucous membranes are moist.   Eyes:      Conjunctiva/sclera: Conjunctivae normal.   Cardiovascular:      Rate and Rhythm: Normal rate and regular rhythm.      Pulses: Normal pulses.      Heart sounds: Normal heart sounds.   Pulmonary:      " Effort: Pulmonary effort is normal.   Musculoskeletal:         General: Deformity present. Normal range of motion.      Right lower leg: Edema present.      Left lower leg: Edema present.   Skin:     General: Skin is warm and dry.   Neurological:      General: No focal deficit present.      Mental Status: She is alert. Mental status is at baseline.      Comments: Alert to person and at times place   Psychiatric:         Mood and Affect: Mood normal.         Behavior: Behavior normal.           LABS     Results for orders placed or performed during the hospital encounter of 05/07/24 (from the past 24 hour(s))   Basic Metabolic Panel   Result Value Ref Range    Glucose 208 (H) 74 - 99 mg/dL    Sodium 148 (H) 136 - 145 mmol/L    Potassium 3.8 3.5 - 5.3 mmol/L    Chloride 110 (H) 98 - 107 mmol/L    Bicarbonate 32 21 - 32 mmol/L    Anion Gap 10 10 - 20 mmol/L    Urea Nitrogen 22 6 - 23 mg/dL    Creatinine 1.66 (H) 0.50 - 1.05 mg/dL    eGFR 35 (L) >60 mL/min/1.73m*2    Calcium 8.1 (L) 8.6 - 10.3 mg/dL   POCT GLUCOSE   Result Value Ref Range    POCT Glucose 199 (H) 74 - 99 mg/dL   CBC and Auto Differential   Result Value Ref Range    WBC 13.4 (H) 4.4 - 11.3 x10*3/uL    nRBC 3.8 (H) 0.0 - 0.0 /100 WBCs    RBC 2.61 (L) 4.00 - 5.20 x10*6/uL    Hemoglobin 7.9 (L) 12.0 - 16.0 g/dL    Hematocrit 25.6 (L) 36.0 - 46.0 %    MCV 98 80 - 100 fL    MCH 30.3 26.0 - 34.0 pg    MCHC 30.9 (L) 32.0 - 36.0 g/dL    RDW 18.1 (H) 11.5 - 14.5 %    Platelets 84 (L) 150 - 450 x10*3/uL    Immature Granulocytes %, Automated 11.8 (H) 0.0 - 0.9 %    Immature Granulocytes Absolute, Automated 1.59 (H) 0.00 - 0.70 x10*3/uL   Basic Metabolic Panel   Result Value Ref Range    Glucose 336 (H) 74 - 99 mg/dL    Sodium 149 (H) 136 - 145 mmol/L    Potassium 3.4 (L) 3.5 - 5.3 mmol/L    Chloride 110 (H) 98 - 107 mmol/L    Bicarbonate 34 (H) 21 - 32 mmol/L    Anion Gap 8 (L) 10 - 20 mmol/L    Urea Nitrogen 22 6 - 23 mg/dL    Creatinine 1.47 (H) 0.50 - 1.05 mg/dL     eGFR 40 (L) >60 mL/min/1.73m*2    Calcium 7.8 (L) 8.6 - 10.3 mg/dL   Magnesium   Result Value Ref Range    Magnesium 1.70 1.60 - 2.40 mg/dL   Manual Differential   Result Value Ref Range    Neutrophils %, Manual 88.0 40.0 - 80.0 %    Bands %, Manual 5.0 0.0 - 5.0 %    Lymphocytes %, Manual 1.0 13.0 - 44.0 %    Monocytes %, Manual 2.0 2.0 - 10.0 %    Eosinophils %, Manual 0.0 0.0 - 6.0 %    Basophils %, Manual 0.0 0.0 - 2.0 %    Metamyelocytes %, Manual 1.0 0.0 - 0.0 %    Myelocytes %, Manual 3.0 0.0 - 0.0 %    Seg Neutrophils Absolute, Manual 11.79 (H) 1.20 - 7.00 x10*3/uL    Bands Absolute, Manual 0.67 0.00 - 0.70 x10*3/uL    Lymphocytes Absolute, Manual 0.13 (L) 1.20 - 4.80 x10*3/uL    Monocytes Absolute, Manual 0.27 0.10 - 1.00 x10*3/uL    Eosinophils Absolute, Manual 0.00 0.00 - 0.70 x10*3/uL    Basophils Absolute, Manual 0.00 0.00 - 0.10 x10*3/uL    Metamyelocytes Absolute, Manual 0.13 0.00 - 0.00 x10*3/uL    Myelocytes Absolute, Manual 0.40 0.00 - 0.00 x10*3/uL    Total Cells Counted 100     Neutrophils Absolute, Manual 12.46 (H) 1.20 - 7.70 x10*3/uL    RBC Morphology See Below     Polychromasia Mild     Ovalocytes Few     Teardrop Cells Few     Columbus Cells Few     Basophilic Stippling Present    ECG 12 Lead   Result Value Ref Range    Ventricular Rate 54 BPM    Atrial Rate 54 BPM    WA Interval 132 ms    QRS Duration 106 ms    QT Interval 454 ms    QTC Calculation(Bazett) 430 ms    P Axis 54 degrees    R Axis -13 degrees    T Axis -26 degrees    QRS Count 8 beats    Q Onset 215 ms    P Onset 149 ms    P Offset 205 ms    T Offset 442 ms    QTC Fredericia 438 ms   POCT GLUCOSE   Result Value Ref Range    POCT Glucose 260 (H) 74 - 99 mg/dL   POCT GLUCOSE   Result Value Ref Range    POCT Glucose 207 (H) 74 - 99 mg/dL        Scheduled medications  [Held by provider] apixaban, 2.5 mg, oral, q12h  atorvastatin, 40 mg, oral, Daily  atovaquone, 1,500 mg, oral, Daily  fludrocortisone, 0.1 mg, oral, Daily  tiotropium,  2 puff, inhalation, Daily   And  fluticasone furoate-vilanteroL, 1 puff, inhalation, Daily  folic acid, 1 mg, oral, Daily  heparin (porcine), 5,000 Units, subcutaneous, q8h  hydrocortisone sodium succinate, 50 mg, intravenous, q12h  [START ON 5/16/2024] insulin glargine, 10 Units, subcutaneous, q AM  insulin glargine, 5 Units, subcutaneous, Once  insulin lispro, 0-15 Units, subcutaneous, TID  levETIRAcetam, 500 mg, oral, BID  melatonin, 5 mg, oral, Nightly  multivitamin with minerals, 1 tablet, oral, Daily  mycophenolate, 1,000 mg, oral, BID  OLANZapine zydis, 5 mg, oral, Nightly  [Held by provider] predniSONE, 15 mg, oral, Daily  thiamine, 100 mg, oral, Daily      Continuous medications  dextrose 5 % in water (D5W), 100 mL/hr, Last Rate: 100 mL/hr (05/15/24 0803)      PRN medications  PRN medications: acetaminophen **OR** acetaminophen, alteplase, dextrose, dextrose, glucagon, hydrALAZINE, ipratropium-albuteroL, ipratropium-albuteroL, ondansetron, oxygen     ECG 12 Lead    Result Date: 5/15/2024  Sinus bradycardia with Premature atrial complexes Voltage criteria for left ventricular hypertrophy Abnormal ECG When compared with ECG of 14-MAY-2024 07:04, (unconfirmed) Premature atrial complexes are now Present Confirmed by Michael Arenas ([id number]) on 5/15/2024 10:46:15 AM    ECG 12 Lead    Result Date: 5/15/2024  Sinus bradycardia Possible Left atrial enlargement Left ventricular hypertrophy Nonspecific ST abnormality Abnormal ECG When compared with ECG of 13-MAY-2024 06:33, (unconfirmed) No significant change was found Confirmed by Michael Arenas ([id number]) on 5/15/2024 10:37:55 AM    ECG 12 Lead    Result Date: 5/15/2024  Sinus bradycardia Voltage criteria for left ventricular hypertrophy Nonspecific ST changes Abnormal ECG When compared with ECG of 12-MAY-2024 06:38, Sinus rhythm has replaced Atrial fibrillation Confirmed by Michael Arenas ([id number]) on 5/15/2024 10:26:42 AM    Bedside Midline Imaging    Result Date:  5/13/2024  These images are not reportable by radiology and will not be interpreted by  Radiologists.    ECG 12 Lead    Result Date: 5/12/2024  Atrial fibrillation Voltage criteria for left ventricular hypertrophy ST & T wave abnormality, consider inferior ischemia Abnormal ECG When compared with ECG of 12-MAY-2024 01:59, (unconfirmed) No significant change was found Confirmed by Antonio Rodriges ([id number]) on 5/12/2024 10:06:57 AM    ECG 12 Lead    Result Date: 5/12/2024  Atrial fibrillation Moderate voltage criteria for LVH, may be normal variant Nonspecific ST and T wave abnormality Abnormal ECG When compared with ECG of 11-MAY-2024 07:08, Atrial fibrillation has replaced Sinus rhythm Non-specific change in ST segment in Lateral leads Confirmed by Antonio Rodriges ([id number]) on 5/12/2024 10:06:48 AM    MR brain w and wo IV contrast    Result Date: 5/11/2024  Interpreted By:  Emelia George, STUDY: MR BRAIN W AND WO IV CONTRAST; 5/11/2024 5:51 pm   INDICATION: Signs/Symptoms:seizure, change in MS.   COMPARISON: CT head from 05/07/2024   ACCESSION NUMBER(S): [id number]   ORDERING CLINICIAN: VINCENT LOPEZ   TECHNIQUE: Axial T2, FLAIR, DWI, gradient echo T2 and T1 weighted images of brain were acquired. Post contrast T1 weighted images were acquired after administration of  gadolinium based intravenous contrast.   FINDINGS: There is no diffusion restriction abnormality to suggest acute infarct.   There is an area of encephalomalacia within the left occipital lobe with patchy hemosiderin deposition.   There are patchy areas of T2 and FLAIR hyperintense signal within bilateral periventricular and subcortical white matter which likely reflect sequela of chronic small vessel ischemic change. Another small area of cortical encephalomalacia is noted within the parasagittal posterior right frontal lobe as well as within the left parietal lobe.   Minimal nonspecific white matter changes are noted within the ryan. There are small remote  infarcts within bilateral cerebellar hemispheres.   Postcontrast imaging demonstrates no focus of abnormal parenchymal or leptomeningeal enhancement.   Visualized paranasal sinuses are clear. Mild opacification of bilateral mastoid air cells is noted.       No evidence of acute infarct, intracranial mass effect or midline shift. Multiple chronic findings as detailed.   Signed by: Emelia George 5/11/2024 6:44 PM Dictation workstation:   [id number]    ECG 12 Lead    Result Date: 5/11/2024  Normal sinus rhythm Moderate voltage criteria for LVH, may be normal variant T wave abnormality, consider inferior ischemia Abnormal ECG When compared with ECG of 10-MAY-2024 10:52, (unconfirmed) ST no longer depressed in Inferior leads Nonspecific T wave abnormality now evident in Anterolateral leads Confirmed by Antonio Rodriges ([id number]) on 5/11/2024 10:13:04 AM    ECG 12 Lead    Result Date: 5/11/2024  Sinus bradycardia Moderate voltage criteria for LVH, may be normal variant Borderline ECG When compared with ECG of 09-MAY-2024 09:01, Nonspecific T wave abnormality no longer evident in Anterior leads Confirmed by Antonio Rodriges ([id number]) on 5/11/2024 10:12:50 AM    Transthoracic Echo (TTE) Limited    Result Date: 5/10/2024          [place] [id number]  Tel [phone] Fax [phone] TRANSTHORACIC ECHOCARDIOGRAM REPORT  Patient Name:      LISBET ZARAGOZA NENITA      Reading Physician:    [id number] Bacilio Sommers DO Study Date:        5/10/2024             Ordering Provider:    [id number] SAMANTHA ANGELES MRN/PID:           [id number]              Fellow: Accession#:        [id number]          Nurse: Date of Birth/Age: 1960 / 63 years Sonographer:          Shahana Osullivan                                                                 [facility] Gender:            F                     Additional Staff: Height:            165.10 cm             Admit Date:           5/7/2024 Weight:            102.06 kg             Admission Status:     Inpatient -                                                                Routine BSA / BMI:         2.08 m2 / 37.44 kg/m2 Department Location:  [facility] Blood Pressure: 167 /53 mmHg Study Type:    TRANSTHORACIC ECHO (TTE) LIMITED Diagnosis/ICD: Bradycardia, unspecified-R00.1 Indication:    Abnormal EKG CPT Codes:     Echo Limited-43202; Color Doppler-61941; Doppler Limited-75530 Patient History: Pertinent History: HTN, Hyperlipidemia, A-Fib, Cardiomyopathy and Lupus. Study Detail: The following Echo studies were performed: 2D, M-Mode, Doppler and               color flow. Technically challenging study due to patient lying in               supine position and the patient's lack of cooperation.  PHYSICIAN INTERPRETATION: Left Ventricle: Left ventricular systolic function is mildly to moderately decreased, with an estimated ejection fraction of 40-45%. There is global hypokinesis of the left ventricle with minor regional variations. The left ventricular cavity size is normal. There is moderate concentric left ventricular hypertrophy. Left ventricular diastolic filling was not assessed. Left Atrium: The left atrium is upper limits of normal in size. Right Ventricle: The right ventricle is normal in size. There is normal right ventricular global systolic function. Right Atrium: The right atrium is normal in size. Aortic Valve: The aortic valve appears structurally normal. The aortic valve appears tricuspid. There is no evidence of aortic valve stenosis. There is no evidence of aortic valve regurgitation. Mitral Valve: The mitral valve is normal in structure. There is no evidence of mitral valve stenosis. There is normal mitral valve leaflet mobility. There is mild to moderate mitral valve  regurgitation. Tricuspid Valve: The tricuspid valve is structurally normal. There is normal tricuspid valve leaflet mobility. There is mild tricuspid regurgitation. Pulmonic Valve: The pulmonic valve is structurally normal. There is no indication of pulmonic valve regurgitation. Pericardium: There is no pericardial effusion noted. Aorta: The aortic root is normal. Pulmonary Artery: The pulmonary artery is not well visualized. Pulmonary hypertension is present. The tricuspid regurgitant velocity is 3.79 m/s, and with an estimated right atrial pressure of 3 mmHg, the estimated pulmonary artery pressure is severely elevated with the RVSP at 60.5 mmHg. Systemic Veins: The inferior vena cava was not well visualized. In comparison to the previous echocardiogram(s): Prior examinations are available and were reviewed for comparison purposes. The left ventricular function is unchanged. The left ventricular hypertrophy is worse.  CONCLUSIONS:  1. Left ventricular systolic function is mildly to moderately decreased with a 40-45% estimated ejection fraction.  2. There is moderate concentric left ventricular hypertrophy.  3. There is no evidence of mitral valve stenosis.  4. Mild to moderate mitral valve regurgitation.  5. Mild tricuspid regurgitation is visualized.  6. Aortic valve stenosis is not present.  7. Pulmonary hypertension is present.  8. Severely elevated pulmonary artery pressure.  9. The pulmonary artery is not well visualized. 10. There is global hypokinesis of the left ventricle with minor regional variations. RECOMMENDATIONS: Technically suboptimal and limited study, therefore accuracy of above interpretation could be substantially diminished. Clinical correlation is advised. Consider additional imaging modalities if clinically indicated.  QUANTITATIVE DATA SUMMARY: 2D MEASUREMENTS:                           Normal Ranges: LAs:           3.90 cm    (2.7-4.0cm) IVSd:          1.32 cm    (0.6-1.1cm) LVPWd:          1.37 cm    (0.6-1.1cm) LVIDd:         4.81 cm    (3.9-5.9cm) LVIDs:         3.87 cm LV Mass Index: 124.5 g/m2 LV % FS        19.5 % LV SYSTOLIC FUNCTION BY 2D PLANIMETRY (MOD):                     Normal Ranges: EF-A4C View: 39.2 % (>=55%) EF-A2C View: 42.1 % EF-Biplane:  40.8 % TRICUSPID VALVE/RVSP:                             Normal Ranges: Peak TR Velocity: 3.79 m/s RV Syst Pressure: 60.5 mmHg (< 30mmHg)  [id number] Bacilio Matthieu FARAH Electronically signed on 5/10/2024 at 10:57:55 PM  ** Final **     XR chest 1 view    Result Date: 5/10/2024  Interpreted By:  Ceci Westbrook, STUDY: XR CHEST 1 VIEW;  5/10/2024 7:00 pm   INDICATION: Signs/Symptoms:Line placement   COMPARISON: Chest x-ray 05/07/2024.   ACCESSION NUMBER(S): [id number]   ORDERING CLINICIAN: GENIE BRIGHT   TECHNIQUE: Portable frontal view of the chest was obtained .   FINDINGS: Monitoring wires and radiopaque densities are overlying the patient.   There is a left IJ central line with the tip overlying the region of the right atrium. There is radiopaque tubing at the soft tissues at the right side of the neck.   The heart is enlarged. Atherosclerotic calcifications are noted at the thoracic aorta. There is vascular congestion. There are bilateral airspace opacities. There are small bilateral pleural effusions. No pneumothorax.   Degenerative changes at the bilateral glenohumeral joints.       1. Left IJ central line with the tip overlying the region of the right atrium. No radiographic evidence for pneumothorax. 2. Radiopaque tubing at the soft tissues of the right side of the neck, may represent a peripherally inserted catheter. Clinical correlation recommended. 3. Cardiomegaly. Vascular congestion. Bilateral airspace opacities, may be secondary to pulmonary edema and/or pneumonia.       MACRO: None.   Signed by: Ceci Westbrook 5/10/2024 8:06 PM Dictation workstation:   [id number]    FL guided lumbar puncture    Result Date: 5/10/2024  Interpreted  By:  Hay Tucker, STUDY: FL GUIDED LUMBAR PUNCTURE;  5/10/2024 1:59 pm   INDICATION: Signs/Symptoms:Encephalopathy, needs LP.   COMPARISON: CT abdomen/pelvis of 04/25/2024.   ACCESSION NUMBER(S): [id number]   ORDERING CLINICIAN: ONELIA GREGORY   FINDINGS: PROCEDURE: After discussion of risks, benefits, and alternatives, oral and written informed consent was obtained.   The patient was placed in prone position on exam table. There were procedure limitations related to limited patient cooperation and agitation. There is also lumbar scoliosis and multilevel degenerative changes of the spine.   The L3-4 interspace was then marked under fluoroscopy. The patient was then prepped and draped in sterile fashion. Local anesthesia was achieved with 2% Lidocaine solution. A 22 gauge spinal needle was then introduced at the L3-L4 level under direct fluoroscopic guidance. There was free return of slightly blood-tinged CSF. Initially approximately 0.5 mL of fluid was able to be collected although the flow of CSF then stopped. Needle was slightly repositioned although additional CSF flow was not able to be achieved. Patient was becoming more agitated and it was decided that the procedure be stopped at this point. The stylet was then replaced and the spinal needle was removed.  Hemostasis was achieved with direct pressure and the area was dressed with a Band-Aid.   Patient was sent back to inpatient floor for routine recovery.   Total fluoroscopy time was 32 seconds.   Total saved images:  1.   The small volume of CSF that was collected was placed in a single sterile vial and submitted for laboratory analysis.       Fluoroscopic guided lumbar puncture attempt. Only a very small volume of slightly blood-tinged CSF was able to be obtained and then flow of fluid stopped. The small volume of fluid obtained was submitted for laboratory analysis.   MACRO: None.   Signed by: Hay Tucker 5/10/2024 2:10 PM Dictation workstation:    [id number]    EEG    IMPRESSION Impression This vEEG is indicative of moderate diffuse encephalopathy. No epileptiform discharges or lateralizing signs are recorded. Multiple non epileptic head clonic events occurred throughout the recording. A full report will be scanned into the patient's chart at a later time. This report has been interpreted and electronically signed by    ECG 12 Lead    Result Date: 5/9/2024  Marked sinus bradycardia Voltage criteria for left ventricular hypertrophy Abnormal ECG When compared with ECG of 07-MAY-2024 13:54, (unconfirmed) Vent. rate has decreased BY  26 BPM Nonspecific T wave abnormality no longer evident in Lateral leads QT has shortened Confirmed by Antonio Rodriges ([id number]) on 5/9/2024 1:14:57 PM    Electrocardiogram, 12-lead PRN ACS symptoms    Result Date: 5/9/2024  Normal sinus rhythm Voltage criteria for left ventricular hypertrophy Nonspecific ST and T wave abnormality Abnormal ECG When compared with ECG of 26-APR-2024 12:24, QT has lengthened See ED provider note for full interpretation and clinical correlation Confirmed by Antonio Rodriges ([id number]) on 5/9/2024 1:09:51 PM    CT head wo IV contrast    Result Date: 5/7/2024  Interpreted By:  Higinio Sanchez, STUDY: CT HEAD WO IV CONTRAST;  5/7/2024 5:01 pm   INDICATION: Signs/Symptoms:AMS.   COMPARISON: 04/26/2024   ACCESSION NUMBER(S): [id number]   ORDERING CLINICIAN: GRAHAM HENDERSON   TECHNIQUE: Noncontrast axial CT scan of head was performed. Angled reformats in brain and bone windows were generated. The images were reviewed in bone, brain, blood and soft tissue windows.   FINDINGS: The ventricles, cisterns and sulci are prominent, consistent with mild diffuse volume loss. There are areas of nonspecific white matter hypodensity, which are probably age-related or microvascular in nature.   Gray-white differentiation is intact and there is no evidence of acute cortical infarct. Hypodensity in the left occipital lobe is  unchanged and similar the prior exam. No mass, mass effect or midline shift is seen. There is no evidence of hemorrhage.   The visualized paranasal sinuses are clear.         No evidence of acute cortical infarct or intracranial hemorrhage.   Stable chronic changes.   MACRO: None   Signed by: Higinio Sanchez 5/7/2024 5:43 PM Dictation workstation:   [id number]    XR chest 1 view    Result Date: 5/7/2024  Interpreted By:  Nic Moseley, STUDY: XR CHEST 1 VIEW  5/7/2024 1:32 pm   INDICATION: Signs/Symptoms:weak   COMPARISON: 04/24/2024   ACCESSION NUMBER(S): [id number]   ORDERING CLINICIAN: GRAHAM HENDERSON   TECHNIQUE: A single AP portable radiograph of the chest was obtained.   FINDINGS: Mild diffuse interstitial infiltrates are seen bilaterally, and may represent edema and/or pneumonia. No pneumothorax is identified. The cardiac silhouette is mildly prominent, similar to prior studies.       Diffuse interstitial infiltrates bilaterally, as above. Clinical correlation and continued follow-up until clearing is recommended.   MACRO: None.   Signed by: Nic Moseley 5/7/2024 2:07 PM Dictation workstation:   [id number]    ECG 12 lead    Result Date: 4/30/2024  Normal sinus rhythm Possible Left atrial enlargement Left ventricular hypertrophy with repolarization abnormality Abnormal ECG When compared with ECG of 20-MAR-2024 00:59, Vent. rate has increased BY  37 BPM Confirmed by Evaristo Moran ([id number]) on 4/30/2024 1:13:25 PM    US right upper quadrant    Result Date: 4/27/2024  Interpreted By:  Graciela Blevins, STUDY: US RIGHT UPPER QUADRANT;  4/27/2024 5:24 pm   INDICATION: Signs/Symptoms:looking for islet tumor.   COMPARISON: Correlation with noncontrast CT abdomen pelvis 04/25/2024   ACCESSION NUMBER(S): [id number]   ORDERING CLINICIAN: TU ZAMARRIPA   TECHNIQUE: Grayscale and color Doppler sonographic imaging of the right upper quadrant.   FINDINGS: LIVER: Normal size. Normal echogenicity, and echotexture. No focal  abnormalities.   BILE DUCTS: No intrahepatic or extrahepatic bile duct dilatation. Extrahepatic bile duct = 5 mm.   GALLBLADDER: Status post cholecystectomy.   PANCREAS: Normal head and body. Limited evaluation of the pancreatic tail due to bowel gas. No pancreatic mass is seen. Pancreatic duct is within normal limits in size.   RIGHT KIDNEY: Normal size, no hydronephrosis.   PERITONEUM: No upper abdominal ascites.       Unremarkable right upper quadrant ultrasound.   No visualized pancreatic mass. Please note that ultrasound is limited in sensitivity for pancreatic masses and further evaluation with nonemergent pancreas protocol MRI may be considered if clinically indicated.   MACRO: None   Signed by: Graciela Blevins 4/27/2024 6:47 PM Dictation workstation:   [id number]    CT head wo IV contrast    Result Date: 4/26/2024  Interpreted By:  Graciela Blevins, STUDY: CT HEAD WO IV CONTRAST;  4/26/2024 5:14 pm   INDICATION: Signs/Symptoms:Altered mental status.   COMPARISON: 03/20/2024   ACCESSION NUMBER(S): [id number]   ORDERING CLINICIAN: TU ZAMARRIPA   TECHNIQUE: Axial noncontrast CT images of the head.   FINDINGS: BRAIN PARENCHYMA: Stable encephalomalacia in the left occipital lobe. Gray-white matter interfaces are otherwisepreserved. Calcifications are in the basal ganglia.   deep and periventricular white matter hypodensities are nonspecific, but favored to represent chronic small vessel ischemic changes.  No mass effect or midline shift.   HEMORRHAGE: No acute intracranial hemorrhage. VENTRICLES and EXTRA-AXIAL SPACES: The ventricles and sulci are within normal limits in size for brain volume. No abnormal extraaxial fluid collection. EXTRACRANIAL SOFT TISSUES: Within normal limits. PARANASAL SINUSES/MASTOIDS:  The visualized paranasal sinuses and mastoid air cells are aerated. CALVARIUM: No depressed skull fracture. No destructive osseous lesion.   OTHER FINDINGS: None.       No acute intracranial abnormality.    Stable chronic findings as above.   MACRO: None   Signed by: Graciela Blevins 4/26/2024 6:56 PM Dictation workstation:   [id number]    XR elbow left 3+ views    Result Date: 4/26/2024  Interpreted By:  Jose A Anderson, STUDY: XR ELBOW LEFT 3+ VIEWS; ;  4/26/2024 3:02 pm   INDICATION: Signs/Symptoms:pain.   COMPARISON: None.   ACCESSION NUMBER(S): [id number]   ORDERING CLINICIAN: VINCENT VARELA   FINDINGS: Small anterior and posterior effusions. No definite fracture visualized. No significant soft tissue swelling. No radiopaque foreign body. Mild degenerative changes.       Small effusions without visualized fracture. Mild degenerative changes of the elbow.     MACRO: None   Signed by: Jose A Anderson 4/26/2024 3:41 PM Dictation workstation:   [id number]    CT elbow left wo IV contrast    Result Date: 4/25/2024  Interpreted By:  Graciela Blevins, STUDY: CT ELBOW LEFT WO IV CONTRAST; ;  4/25/2024 8:31 pm   INDICATION: Signs/Symptoms:Self mutilation of antecubital fossa, joint tender to touch, looking for infection.   COMPARISON: None.   ACCESSION NUMBER(S): [id number]   ORDERING CLINICIAN: TU ZAMARRIPA   TECHNIQUE: Noncontrast CT images of the left elbow with axial, sagittal and coronal reconstructed images.   FINDINGS: No acute fracture or malalignment. 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 No erosions or destructive changes. There is a small elbow joint effusion. Otherwise, soft tissues are unremarkable. No soft tissue gas or radiopaque foreign body.       Nonspecific small elbow joint effusion. If there is clinical concern for septic arthritis, joint aspiration is recommended.   Otherwise, soft tissues are unremarkable.   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     MACRO: None   Signed by: Graciela Blevins 4/25/2024 9:54 PM Dictation workstation:   [id number]    CT abdomen pelvis wo IV contrast    Result Date: 4/25/2024  Interpreted By:  Graciela Blevins, STUDY: CT ABDOMEN PELVIS WO IV CONTRAST;   4/25/2024 8:31 pm   INDICATION: Signs/Symptoms:3 pt drop in hgb, AMS, worsening encephalopathy, hx of secondary adrenal insuficiency and hypoglycemia.   COMPARISON: 03/20/2024   ACCESSION NUMBER(S): [id number]   ORDERING CLINICIAN: TU ZAMARRIPA   TECHNIQUE: Axial noncontrast CT images of the abdomen and pelvis with coronal and sagittal reconstructed images.   FINDINGS: LOWER CHEST: New bilateral lower lobe airspace opacities. Cardiomegaly and coronary artery calcifications noted. BONES: No acute osseous abnormality. Diffuse degenerative disc changes and lumbar facet arthropathy. Right total hip arthroplasty. Severe left hip osteoarthrosis. ABDOMINAL WALL: Within normal limits.   ABDOMEN: Lack of intravenous contrast limits evaluation of vessels and solid organs. LIVER: Within normal limits. BILE DUCTS: No biliary dilatation. GALLBLADDER: Cholecystectomy. PANCREAS: No peripancreatic inflammatory stranding or duct dilatation. SPLEEN: Within normal limits. ADRENALS: Within normal limits.   KIDNEYS, URETERS, URINARY BLADDER: No hydronephrosis. 2 mm nonobstructing right renal calculus. Limited evaluation of the distal ureters due to streak artifact. No hydroureter. Evaluation of the urinary bladder due to streak artifact.   VESSELS: No aortic aneurysm. RETROPERITONEUM: No pathologically enlarged lymph nodes.   PELVIS:   REPRODUCTIVE ORGANS: No abnormality, given limitations of the noncontrast CT.   BOWEL: The stomach is mildly distended. No dilated small bowel. Colonic diverticulosis without acute diverticulitis. Normal appendix. PERITONEUM: No ascites or free air, no fluid collection.       No acute abdominal or pelvic process.   Nonspecific bilateral lower lobe airspace opacities. Please correlate clinically to exclude pneumonia.   MACRO: None   Signed by: Graciela Blevins 4/25/2024 9:51 PM Dictation workstation:   [id number]    Bedside Peripheral IV Imaging    Result Date: 4/25/2024  These images are not reportable  by radiology and will not be interpreted by  Radiologists.    Lower extremity venous duplex bilateral    Result Date: 4/25/2024            [facility] [place] [address] [place] [address]     Tel [phone] Fax [phone]  Vascular Lab Report  [facility] US LOWER EXTREMITY VENOUS DUPLEX BILATERAL Patient Name:      LISBET GUTIERRES      Reading Physician:  [id number] Enzo Ashley MD, RPVI Study Date:        4/25/2024             Ordering Provider:  [id number] HILDA JUAREZ MRN/PID:           [id number]              Fellow: Accession#:        [id number]          Technologist:       Beth Hannah RVT, RDMS Date of Birth/Age: 1960 / 63 years Technologist 2: Gender:            F                     Encounter#:         [id number] Admission Status:  Inpatient             Location Performed: [facility]  Diagnosis/ICD: Other specified soft tissue disorders-M79.89 Indication:    Limb swelling CPT Codes:     71041 Peripheral venous duplex scan for DVT complete  Pertinent History: COPD, Anticoagulation, AAA and LE Edema. Afib.  CONCLUSIONS: Right Lower Venous: No evidence of acute deep vein thrombus visualized in the right lower extremity. Left Lower Venous: No evidence of acute deep vein thrombus visualized in the left lower extremity. Additional Findings: Limited images due to patient's inability to tolerate compressions.  Comparison: Compared with study from 1/15/2024, no significant change.No evidence of DVT.  Imaging & Doppler Findings:  Right                 Compressible Thrombus        Flow Distal External Iliac                None   Spontaneous/Phasic CFV                       Yes        None   Spontaneous/Phasic PFV                       Yes         None FV Proximal               Yes        None   Spontaneous/Phasic FV Mid                    Yes        None FV Distal                 Yes        None Popliteal                 Yes        None   Spontaneous/Phasic Peroneal                  Yes        None PTV                       Yes        None  Left                  Compress Thrombus        Flow Distal External Iliac            None   Spontaneous/Phasic CFV                     Yes      None   Spontaneous/Phasic PFV                     Yes      None FV Proximal             Yes      None   Spontaneous/Phasic FV Mid                  Yes      None FV Distal               Yes      None Popliteal               Yes      None   Spontaneous/Phasic Peroneal                Yes      None PTV                     Yes      None  [id number] Enzo Ashley MD, ROLANDO Electronically signed by [id number] ROLANDO Krishna MD on 4/25/2024 at 1:07:18 PM  ** Final **     XR chest 1 view    Result Date: 4/24/2024  Interpreted By:  Mitesh Khan, STUDY: XR CHEST 1 VIEW;  4/24/2024 5:41 pm   INDICATION: Signs/Symptoms:cough.   COMPARISON: 03/19/2024   ACCESSION NUMBER(S): [id number]   ORDERING CLINICIAN: ENEIDA GRIER   FINDINGS:         CARDIOMEDIASTINAL SILHOUETTE: Cardiomediastinal silhouette is enlarged.   LUNGS: There is interval appearance of a right upper lobe patchy airspace disease concerning for pneumonia. The left lung is clear. There is no effusion   ABDOMEN: No remarkable upper abdominal findings.   BONES: No acute osseous changes.       1.  New right upper lobe airspace disease compatible with pneumonia in the proper clinical setting. Radiographic follow-up to resolution in 3-4 weeks is advised.       MACRO: None   Signed by: Mitesh Khan 4/24/2024 5:50 PM Dictation workstation:   [id number]       Encounter Date: 05/07/24   ECG 12 Lead   Result Value    Ventricular Rate 54    Atrial Rate 54    OR Interval 132    QRS Duration 106    QT Interval 454    QTC  Calculation(Bazett) 430    P Axis 54    R Axis -13    T Axis -26    QRS Count 8    Q Onset 215    P Onset 149    P Offset 205    T Offset 442    QTC Fredericia 438    Narrative    Sinus bradycardia with Premature atrial complexes  Voltage criteria for left ventricular hypertrophy  Abnormal ECG  When compared with ECG of 14-MAY-2024 07:04, (unconfirmed)  Premature atrial complexes are now Present  Confirmed by Michael Arenas ([id number]) on 5/15/2024 10:46:15 AM        Tele Monitoring: Sinus bradycardia/ sinus rhythm with heart rate 45 to 100 bpm    Assessment     Patient Active Problem List   Diagnosis    COVID-19    Lupus (Multi)    Ambulatory dysfunction    Myelodysplastic syndrome, unspecified (Multi)    Lupus vasculitis (Multi)    Hyperlipidemia    Pneumonia of both lower lobes due to infectious organism    Hallucinations    Leg swelling    Hyperglycemia    JED (acute kidney injury) (CMS-[facility])    Hypernatremia    Proliferative diabetic retinopathy of right eye without macular edema determined by examination associated with type 2 diabetes mellitus (Multi)    Urinary incontinence    Paraparesis (Multi)    Abdominal cramping    Abnormal echocardiogram    Abnormal gait    Abnormal thyroid blood test    Advanced COPD (Multi)    Afferent pupillary defect of right eye    Anemia    Pancytopenia (Multi)    Altitudinal scotoma of right eye    Arcuate scotoma of left eye    Arthropathy    Asymptomatic PVCs    PAF (paroxysmal atrial fibrillation) (Multi)    Balance problem    Bilateral high frequency sensorineural hearing loss    Bradycardia    Branch retinal artery occlusion    Cardiomyopathy (Multi)    Chronic diarrhea    Chronic fatigue    Chronic kidney disease (CKD), stage III (moderate) (Multi)    CKD stage G3a/A2, GFR 45-59 and albumin creatinine ratio  mg/g (Multi)    Combined forms of age-related cataract of left eye    Combined forms of age-related cataract of right eye    Contracture of hand    Snoring    CVA  (cerebral vascular accident) (Multi)    Daytime somnolence    Degeneration of lumbar/lumbosacral disc without myelopathy    Depression, major    Dizziness    Dupuytren's contracture    Eczema    Elevated alkaline phosphatase level    Encephalopathy acute    Facial twitching    Fibromyalgia    Fungal nail infection    GERD (gastroesophageal reflux disease)    Gouty arthropathy    H/O iritis    H/O orthostatic hypotension    Hereditary elliptocytosis (CMS-HCC)    High level of cardiac marker    Hip hematoma, right    Hypothermia    Insomnia    Leg edema, left    Loss of consciousness (Multi)    Low blood sugar reading    Lung nodule, multiple    Lupus nephritis (Multi)    Metabolic encephalopathy    Muscle cramps    Nodule of skin of left lower leg    Obstructive lung disease (Multi)    Osteoarthritis of left hand    Osteoarthritis of right hand    Osteopenia    Osteoporosis    Palpitations    Peripheral visual field defect    Persistent proteinuria    Positive colorectal cancer screening using Cologuard test    Benign essential HTN    Psychosis (Multi)    Pulmonary hypertension (Multi)    Refractive error    Hypertensive retinopathy    Retinal neovascularization    Secondary pulmonary arterial hypertension (Multi)    Shortness of breath on exertion    Spinal stenosis of lumbar region with neurogenic claudication    Subjective tinnitus of both ears    Swelling of right foot    Syncope and collapse    Thrombophlebitis of superficial veins of left lower extremity    Tinnitus of left ear    Vitamin D deficiency    DM type 2 with diabetic peripheral neuropathy (Multi)    Systemic lupus erythematosus (Multi)    Diabetic nephropathy (Multi)    Functional diarrhea    UTI (urinary tract infection)    Moderate protein-calorie malnutrition (Multi)    Shock, unspecified (Multi)    Hyperkalemia    Adrenal insufficiency (Multi)    Seizure-like activity (Multi)    Meningitis (HHS-HCC)    Encephalopathy    Seizure (Multi)     Uncontrolled blood glucose    Cervical spondylosis with myelopathy    COPD (chronic obstructive pulmonary disease) (Multi)    Encounter for diabetes education    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  Marked sinus bradycardia with tachybradycardia  2.  Hypertension  3.  Lupus  4.  Chronic kidney disease  5.  Anemia  6.  History of coronary artery disease with coronary artery bypass graft surgery in 2013  7.  History of atrial fibrillation on Eliquis therapy  8.  History of adrenal insufficiency  9.  Cardiomyopathy with a left ventricular ejection fraction of 40% per echocardiogram in March/2024 - repeat echo 5/10/24 shows LVEF 40-45% with moderate LVH  10. Pulmonary artery HTN with severly elevated PAP (60.5 mmhg)   11.  Thrombocytopenia    Plan:  Due to patient's marked sinus bradycardia and episodes of atrial fibrillation with RVR patient would benefit from permanent pacemaker implant when clinically stable  Eliquis currently on hold and will plan for dual-chamber permanent pacemaker implant prior to discharge  Continue to monitor on telemetry  If patient has recurrent atrial fibrillation will need to bridge with heparin, no heparin needed at this time as patient is in sinus rhythm  Patient may need blood transfusion/ platelet transfusion prior to pacemaker placement - further recommendations per Dr Rodriges          Electronically signed by ASIM Zaman on 5/15/2024 at 11:57 AM    Patient is doing  well  Maintaining sinus rhythm-sinus bradycardia  Patient will be scheduled for pacemaker early next week due to evidence of significant bradycardia and recurrence of atrial fibrillation for which she may require beta-blocker therapy or antiarrhythmic therapy.  Also she has a history of coronary artery disease with coronary artery bypass graft surgery in the past.  Continue telemetry  Hold Eliquis therapy for now.  If she has recurrence of atrial fibrillation she may need to be on IV heparin    Antonio Rodriges MD

## 2024-05-15 NOTE — PROGRESS NOTES
Endocrinology Progress Note  Patient: Bing Holliday  Unit/Bed: 1107/1107-A  YOB: 1960  MRN: [id number]  Acct: [id number]   Admitting Diagnosis: Disorientation [R41.0]  Acute exacerbation of chronic obstructive pulmonary disease (COPD) (Multi) [J44.1]  Hypothermia, initial encounter [T68.XXXA]  Acute pneumonia [J18.9]  Date:  5/7/2024  Hospital Day: 8  Attending: Mitesh Agarwal MD       Complaint:  Chief Complaint   Patient presents with    Altered Mental Status          Assessment     Patient Active Problem List   Diagnosis    COVID-19    Lupus (Multi)    Ambulatory dysfunction    Myelodysplastic syndrome, unspecified (Multi)    Lupus vasculitis (Multi)    Hyperlipidemia    Pneumonia of both lower lobes due to infectious organism    Hallucinations    Leg swelling    Hyperglycemia    JED (acute kidney injury) (CMS-[facility])    Hypernatremia    Proliferative diabetic retinopathy of right eye without macular edema determined by examination associated with type 2 diabetes mellitus (Multi)    Urinary incontinence    Paraparesis (Multi)    Abdominal cramping    Abnormal echocardiogram    Abnormal gait    Abnormal thyroid blood test    Advanced COPD (Multi)    Afferent pupillary defect of right eye    Anemia    Pancytopenia (Multi)    Altitudinal scotoma of right eye    Arcuate scotoma of left eye    Arthropathy    Asymptomatic PVCs    PAF (paroxysmal atrial fibrillation) (Multi)    Balance problem    Bilateral high frequency sensorineural hearing loss    Bradycardia    Branch retinal artery occlusion    Cardiomyopathy (Multi)    Chronic diarrhea    Chronic fatigue    Chronic kidney disease (CKD), stage III (moderate) (Multi)    CKD stage G3a/A2, GFR 45-59 and albumin creatinine ratio  mg/g (Multi)    Combined forms of age-related cataract of left eye    Combined forms of age-related cataract of right eye    Contracture of hand    Snoring    CVA (cerebral vascular accident) (Multi)    Daytime  somnolence    Degeneration of lumbar/lumbosacral disc without myelopathy    Depression, major    Dizziness    Dupuytren's contracture    Eczema    Elevated alkaline phosphatase level    Encephalopathy acute    Facial twitching    Fibromyalgia    Fungal nail infection    GERD (gastroesophageal reflux disease)    Gouty arthropathy    H/O iritis    H/O orthostatic hypotension    Hereditary elliptocytosis (CMS-HCC)    High level of cardiac marker    Hip hematoma, right    Hypothermia    Insomnia    Leg edema, left    Loss of consciousness (Multi)    Low blood sugar reading    Lung nodule, multiple    Lupus nephritis (Multi)    Metabolic encephalopathy    Muscle cramps    Nodule of skin of left lower leg    Obstructive lung disease (Multi)    Osteoarthritis of left hand    Osteoarthritis of right hand    Osteopenia    Osteoporosis    Palpitations    Peripheral visual field defect    Persistent proteinuria    Positive colorectal cancer screening using Cologuard test    Benign essential HTN    Psychosis (Multi)    Pulmonary hypertension (Multi)    Refractive error    Hypertensive retinopathy    Retinal neovascularization    Secondary pulmonary arterial hypertension (Multi)    Shortness of breath on exertion    Spinal stenosis of lumbar region with neurogenic claudication    Subjective tinnitus of both ears    Swelling of right foot    Syncope and collapse    Thrombophlebitis of superficial veins of left lower extremity    Tinnitus of left ear    Vitamin D deficiency    DM type 2 with diabetic peripheral neuropathy (Multi)    Systemic lupus erythematosus (Multi)    Diabetic nephropathy (Multi)    Functional diarrhea    UTI (urinary tract infection)    Moderate protein-calorie malnutrition (Multi)    Shock, unspecified (Multi)    Hyperkalemia    Adrenal insufficiency (Multi)    Seizure-like activity (Multi)    Meningitis (HHS-HCC)    Encephalopathy    Seizure (Multi)    Uncontrolled blood glucose    Cervical spondylosis with  "myelopathy    COPD (chronic obstructive pulmonary disease) (Multi)    Encounter for diabetes education    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)    Chronic kidney disease, stage 4 (severe) (Multi)    Ulcerative (chronic) pancolitis with rectal bleeding (Multi)    Anxiety    Cortical cataract    Hyperparathyroidism due to renal insufficiency (Multi)    Iron deficiency anemia due to chronic blood loss    Left ventricular hypertrophy    Nephrosclerosis    Obesity    Ocular migraine    Persistent cough    Proliferative diabetic retinopathy (Multi)    Raynaud's disease    Disorientation          Plan:  Adrenal insufficiency  Start switching to oral will stop IV and switch to the cortisone 20 mg in the morning oral and 10 mg the evening along with fludrocortisone 0.1 mg daily        SUBJECTIVE    Patient more stable transferred out of the ICU to the medical floor today  Her last hydrocortisone IV has been lowered down and started on fludrocortisone also daily 2 days ago.        VITALS      Vitals:    05/15/24 1400 05/15/24 1500 05/15/24 1600 05/15/24 1856   BP: 162/71 159/77 156/79 165/79   BP Location:       Patient Position:       Pulse: 60 62 54 63   Resp: 15 24 17 18   Temp: 35.9 °C (96.6 °F)   35.6 °C (96.1 °F)   TempSrc: Temporal      SpO2: 98% 99% 100% 93%   Weight:       Height:           Intake/Output Summary (Last 24 hours) at 5/15/2024 1942  Last data filed at 5/15/2024 1400  Gross per 24 hour   Intake 3147 ml   Output 3300 ml   Net -153 ml      Wt Readings from Last 4 Encounters:   05/13/24 102 kg (224 lb 13.9 oz)   04/24/24 90.9 kg (200 lb 6.4 oz)   03/19/24 96.5 kg (212 lb 11.9 oz)   03/11/24 96.5 kg (212 lb 11.2 oz)        Allergies:  Allergies   Allergen Reactions    Ace Inhibitors Swelling, Angioedema, Shortness of breath and Other     'FACIAL TWISTING\" \"LOOKS LIKE I HAD A STROKE IN MY SLEEP\"    Hydroxychloroquine Other, Nausea And Vomiting, Nausea/vomiting and " Unknown     RETINAL BLEEDING    RETINAL BLEEDING      RETINAL BLEEDING, Eye problems    Lisinopril Swelling    Penicillins Unknown     tolerates cephalosporins-unknown childhood allergy    Sulfa (Sulfonamide Antibiotics) Hives        PHYSICAL EXAM   Physical Exam  Lying in bed vitals reviewed    LABS   Magnesium:  Results from last 7 days   Lab Units 05/15/24  0308 05/14/24  0339 05/13/24  0340   MAGNESIUM mg/dL 1.70 1.89 2.02     Lipid Panel:       Lab Review  Lab Results   Component Value Date    BILITOT 0.3 05/12/2024    CALCIUM 7.8 (L) 05/15/2024    CO2 33 (H) 05/15/2024     (H) 05/15/2024    CREATININE 1.44 (H) 05/15/2024    GLUCOSE 121 (H) 05/15/2024    ALKPHOS 114 05/12/2024    K 3.2 (L) 05/15/2024    PROT 5.0 (L) 05/12/2024     (H) 05/15/2024    AST 17 05/12/2024    ALT 17 05/12/2024    BUN 21 05/15/2024    ANIONGAP 9 (L) 05/15/2024    MG 1.70 05/15/2024    PHOS 2.4 (L) 05/13/2024    GGT 63 (H) 01/07/2021     04/25/2024    ALBUMIN 2.6 (L) 05/12/2024    LIPASE 30 01/24/2023    GFRF 30 (A) 09/01/2023    GFRMALE CANCELED 04/05/2023     Lab Results   Component Value Date    TRIG 79 03/24/2023    CHOL 160 03/24/2023    HDL 70.9 03/24/2023     Lab Results   Component Value Date    HGBA1C 8.4 (H) 04/24/2024    HGBA1C 8.0 (H) 02/08/2024    HGBA1C 7.1 (H) 01/24/2024     The ASCVD Risk score (Stone DK, et al., 2019) failed to calculate for the following reasons:    The patient has a prior MI or stroke diagnosis   Lab Results   Component Value Date    HGBA1C 8.4 (H) 04/24/2024    HGBA1C 8.0 (H) 02/08/2024    HGBA1C 7.1 (H) 01/24/2024     (H) 05/15/2024    K 3.2 (L) 05/15/2024     (H) 05/15/2024    CO2 33 (H) 05/15/2024    BUN 21 05/15/2024    CREATININE 1.44 (H) 05/15/2024    CALCIUM 7.8 (L) 05/15/2024    ALBUMIN 2.6 (L) 05/12/2024    PROT 5.0 (L) 05/12/2024    BILITOT 0.3 05/12/2024    ALKPHOS 114 05/12/2024    ALT 17 05/12/2024    AST 17 05/12/2024    GLUCOSE 121 (H) 05/15/2024    CHOL  160 03/24/2023    TRIG 79 03/24/2023    HDL 70.9 03/24/2023    BHYDRXBUT 0.07 10/23/2023    CPEPTIDE 2.9 04/25/2024    INSAB <0.4 11/21/2023        [Held by provider] apixaban, 2.5 mg, oral, q12h  atorvastatin, 40 mg, oral, Daily  atovaquone, 1,500 mg, oral, Daily  fludrocortisone, 0.1 mg, oral, Daily  tiotropium, 2 puff, inhalation, Daily   And  fluticasone furoate-vilanteroL, 1 puff, inhalation, Daily  folic acid, 1 mg, oral, Daily  heparin (porcine), 5,000 Units, subcutaneous, q8h  hydrocortisone sodium succinate, 50 mg, intravenous, q12h  [START ON 5/16/2024] insulin glargine, 10 Units, subcutaneous, q AM  insulin lispro, 0-15 Units, subcutaneous, TID  levETIRAcetam, 500 mg, oral, BID  melatonin, 5 mg, oral, Nightly  multivitamin with minerals, 1 tablet, oral, Daily  mycophenolate, 1,000 mg, oral, BID  OLANZapine zydis, 5 mg, oral, Nightly  thiamine, 100 mg, oral, Daily             Electronically signed by Mela Banuelos MD on 5/15/2024 at 7:42 PM

## 2024-05-15 NOTE — PROGRESS NOTES
[facility] Critical Care Medicine       Date:  5/15/2024  Patient:  Bing Holliday  YOB: 1960  MRN:  [id number]   Admit Date:  5/7/2024  ========================================================================================================    Chief Complaint   Patient presents with    Altered Mental Status         History of Present Illness:  Bing Holliday is a 63 y.o. 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  History somewhat limited as the patient, while she awakens and is able to answer simple questions, is unable to offer any meaningful history.     Extensive chart review undertaken.  Patient with multiple presentations of the same.  Admitted from 1/17 - 1-28 to [facility] for similar presentation.  Underwent extensive workup there at that time, was treated with stress to steroids, rheumatology consultation who do not believe that she was in acute lupus flare, endocrinology consultation, as well as neurology consultation.  During that admission there was concern for potential seizure-like activity, the patient underwent a video EEG, was found to be in status, and was loaded with Keppra and continued on maintenance dosing.  She ultimately was discharged to skilled nursing facility for ongoing care.  At the time of discharge her noted prednisone dosing was 15 mg daily.     She was then readmitted to the hospital 3/9 through 3/14 with hypothermia, hyponatremia, altered mental status, and hypoglycemia.  She again underwent an extensive workup, was seen by endocrinology, manage on stress dose steroids, no infectious etiology elucidated  despite a very exhaustive search, she improved, and at that time she was discharged on what is documented as 20 mg of prednisone daily.     She then was readmitted to the hospital 3/19 through 3/26 with almost identical presentation of hypothermia, hyponatremia, hypoglycemia.  She was treated with stress dose steroids, underwent an MRI of the brain which was unrevealing, she returned to her baseline, and was discharged back to her skilled nursing facility.  At that time she was discharged home and is reported to be 20 mg of prednisone daily.     She then was readmitted to the hospital 4/24 to 4/30 for exactly the same presentation including hypoglycemia, hallucinations, hypothermia, and altered mental status.  And was treated in a very similar fashion including stress dose steroids.  She underwent an infectious workup, was treated for community-acquired pneumonia, and ultimately improved.  She was discharged with what was reported to be 20 mg of prednisone daily.     She presents today with hyperglycemia.  She received glucagon prior to arrival.  In the emergency department she was found to be leukopenic, hyponatremic, with a hemoglobin of 7.8 which is near her baseline hemoglobin.  She was given 1 g of ceftriaxone, 100 mg of hydrocortisone, and placed on a Allan hugger.  She was referred to the ICU for admission given her multiple medical comorbidities.       I was able to discuss case with the patient's skilled nursing facility, they note that she is getting 15 mg of prednisone daily. The recent discharge summary is from her prior admissions, the patient was to be discharged on 20 mg of prednisone as this is a dose that she has been well on.          Interval ICU Events:     5/7: Patient admitted to the ICU.  Seen and examined in the emergency department.  Somnolent but arouses to voice and follows simple commands.  Is without complaint.  Attempted to reach out to the patient's daughter who did not answer the  "phone.    5/8: No acute overnight events.  Patient remains easily arousable to voice but minimally interactive.  Hypothermia improving.  Hypoglycemia resolved.  Creatinine stable.  Urinalysis consistent with urinary tract infection.  Endocrinology consultation completed, agree with stress dose steroids.  EEG to be performed this morning.  Keppra level therapeutic.  Procalcitonin pending.    5/9: No acute overnight events.  Asymptomatic bradycardia noted on telemetry.  Patient becoming more responsive, agitated at times.  Discussed with son at bedside yesterday, notes that this is normal progression of her recovery when she is been hospitalized before.  No seizure activity reported on EEG.  Neurology consultation completed, recommending EEG, MRI when able.  Hypothermia improved, off Allan hugger.  Remains on stress dose steroids.  Urine culture finalized as negative.  Blood cultures prelim negative.  Urine antigens negative.  Anemic this morning with a hemoglobin of 6.9, 1 unit PRBCs ordered.    5/10: No acute overnight events.  Remains with asymptomatic bradycardia.  Electrophysiology saw the patient yesterday, may in the future require a pacemaker however no acute intervention as she is asymptomatic.  Mental status improving this morning, more alert and responsive to name, says \"good morning\" but does not answer other questions appropriately.  LP attempted at bedside yesterday without success, plan for potential IR guided LP today however given improving mental status with stress dose steroids low suspicion for bacterial meningitis/viral encephalitis remains low but given her immunocompromise state will need to rule out.    5/11: No acute overnight events.  Patient's mental status unchanged.  Awakens to voice, does not answer questions appropriately, however she appears that she is trying to say hospital when asked where she is.  LP completed yesterday, cultures pending.  Rapid meningitis panel negative.  All " "cultures have remained negative to date.  Bradycardia has resolved.  Glucose stable    5/12: Patient much improved this morning.  She is more awake and alert.  Able to state her name.  When told that she is was in the hospital she stated \"what is wrong with me\".  Knows her children's names.  Denies pain.  Remains without leukocytosis.  Hemoglobin stable.  Creatinine near baseline.  CSF so far negative.  Gram stain/culture currently pending and remains on empiric meningitis/encephalitis treatment.  In rate controlled A-fib on the monitor.  MRI completed yesterday without acute findings.    5/13: Pt is alert to self this am. Following minimal commands. Denies pain. Hemodynamically stable, converted to sinus angelica. On RA with SPO2 greater than 92%.  HGB 7.6. No leukocytosis.     5/14: Patient is awake alert x 3 this morning.  Following all commands.  Denies pain or shortness of breath this morning.  Patient is sinus bradycardia and hemodynamically stable.  Hemoglobin stable.  Switched hydrocortisone to 50 every 8 and started Florinef per endocrinology.    5/18: She is awake, alert and oriented x 3. She is hemodynamically stable, HR NSR with no ectopy. She does state she does feel confused.     Medical History:  Past Medical History:   Diagnosis Date    Abnormal kidney function 04/04/2023    Acute headache 03/10/2024    Acute low back pain 10/30/2023    Acute upper respiratory infection, unspecified 03/04/2020    Acute URI    Acute upper respiratory infection, unspecified 09/30/2015    URTI (acute upper respiratory infection)    Arthritis     Atypical facial pain 03/10/2024    Body mass index (BMI) 23.0-23.9, adult 10/15/2021    BMI 23.0-23.9, adult    Body mass index (BMI) 33.0-33.9, adult 03/04/2020    BMI 33.0-33.9,adult    Cardiomegaly 08/27/2013    Left ventricular hypertrophy    Chest pain 06/10/2022    Comment on above: Added by Problem List Migration; 2013-3-12; Moved to Suppressed Nov 25 2013 9:16PM; Comment on " above: Added by Problem List Migration; 2013-3-12; Moved to [facility] Nov 25 2013 9:16PM;    Chronic kidney disease, stage 3 unspecified (Multi) 07/02/2013    Chronic kidney disease, stage III (moderate)    Confusional state 03/10/2024    Contact with and (suspected) exposure to covid-19 04/04/2023    Delirium 03/10/2024    Disease of pericardium, unspecified ([facility]) 07/02/2013    Pericardial disease    Encounter for follow-up examination after completed treatment for conditions other than malignant neoplasm 10/06/2022    Hospital discharge follow-up    Generalized contraction of visual field, right eye 01/29/2015    Generalized contraction of visual field of right eye    Hearing loss 03/10/2024    History of cataract 03/10/2024    History of thrombocytopenia 03/10/2024    Comment on above: Added by Problem List Migration; 2013-7-2;    Homonymous bilateral field defects, right side 04/29/2016    Homonymous bilateral field defects of right side    Hypertensive chronic kidney disease with stage 1 through stage 4 chronic kidney disease, or unspecified chronic kidney disease 07/02/2013    Nephrosclerosis    Laceration without foreign body, left foot, initial encounter 07/03/2018    Foot laceration, left, initial encounter    Localized edema 03/10/2024    Mass of skin 03/10/2024    Migraine with aura, not intractable, without status migrainosus 10/24/2022    Ocular migraine    Open wound 03/10/2024    Open wound of left foot 03/10/2024    Other conditions influencing health status 07/02/2013    Chronic Glomerulonephritis In Diseases Classified Elsewhere    Other conditions influencing health status 07/02/2013    Progressive Familial Myoclonic Epilepsy    Other conditions influencing health status 07/02/2013    Protein S Deficiency    Other conditions influencing health status 05/22/2015    Familial Combined Hyperlipidemia    Other conditions influencing health status 10/24/2022    IDDM (insulin dependent diabetes  mellitus)    Other conditions influencing health status 03/14/2022    Diabetes mellitus, insulin dependent (IDDM), uncontrolled    Other long term (current) drug therapy 10/24/2022    Long-term use of Plaquenil    Overweight with body mass index (BMI) 25.0-29.9 03/10/2024    Pain of toe 03/10/2024    Personal history of COVID-19 04/04/2023    Personal history of diseases of the blood and blood-forming organs and certain disorders involving the immune mechanism 07/02/2013    History of thrombocytopenia    Personal history of diseases of the skin and subcutaneous tissue 08/11/2015    History of foot ulcer    Personal history of nephrotic syndrome 07/02/2013    History of nephrotic syndrome    Personal history of other diseases of the circulatory system 08/27/2013    History of sinus tachycardia    Personal history of other diseases of the nervous system and sense organs     History of cataract    Personal history of other diseases of the respiratory system     History of bronchitis    Personal history of other infectious and parasitic diseases 07/02/2013    History of hepatitis    Personal history of other specified conditions     History of shortness of breath    Personal history of other specified conditions 08/27/2013    History of edema    Posterior epistaxis 03/10/2024    Puckering of macula, right eye 10/24/2022    ERM OD (epiretinal membrane, right eye)    Raynaud's syndrome without gangrene 07/02/2013    Raynaud's disease    Sinusitis 03/10/2024    Systemic lupus erythematosus, unspecified (Multi) 07/24/2015    SLE (systemic lupus erythematosus)    Systemic lupus erythematosus, unspecified (Multi) 07/24/2015    SLE (systemic lupus erythematosus)    Systemic lupus erythematosus, unspecified (Multi) 07/24/2015    Systemic lupus    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) 07/02/2013    Type 2 diabetes with nephropathy    Type 2 diabetes mellitus with mild nonproliferative diabetic retinopathy  without macular edema, left eye (Multi) 07/27/2015    Non-proliferative diabetic retinopathy, left eye    Type 2 diabetes mellitus with mild nonproliferative diabetic retinopathy without macular edema, unspecified eye (Multi) 07/24/2015    Mild non proliferative diabetic retinopathy    Type 2 diabetes mellitus with proliferative diabetic retinopathy without macular edema, right eye (Multi) 07/27/2015    Proliferative diabetic retinopathy of right eye    Type 2 diabetes mellitus with proliferative diabetic retinopathy without macular edema, unspecified eye (Multi) 07/24/2015    Diabetic proliferative retinopathy    Unspecified acute and subacute iridocyclitis 07/24/2015    Acute iritis, right eye    Unspecified open wound, left foot, sequela 07/03/2018    Wound, open, foot, left, sequela     Past Surgical History:   Procedure Laterality Date    ANKLE SURGERY  01/29/2015    Ankle Surgery    CHOLECYSTECTOMY  01/29/2015    Cholecystectomy    CT GUIDED PERCUTANEOUS BIOPSY BONE DEEP  5/4/2021    CT GUIDED PERCUTANEOUS BIOPSY BONE DEEP 5/4/2021 [facility] CLINICAL LEGACY    EYE SURGERY  03/06/2015    Eye Surgery    FOOT SURGERY  01/29/2015    Foot Surgery    MR HEAD ANGIO WO IV CONTRAST  7/26/2013    MR HEAD ANGIO WO IV CONTRAST 7/26/2013 [facility] CLINICAL LEGACY    MR HEAD ANGIO WO IV CONTRAST  9/17/2021    MR HEAD ANGIO WO IV CONTRAST 9/17/2021 [facility]    MR HEAD ANGIO WO IV CONTRAST  3/25/2023    MR HEAD ANGIO WO IV CONTRAST [facility]    MR NECK ANGIO WO IV CONTRAST  7/26/2013    MR NECK ANGIO WO IV CONTRAST 7/26/2013 [facility] CLINICAL LEGACY    MR NECK ANGIO WO IV CONTRAST  9/17/2021    MR NECK ANGIO WO IV CONTRAST 9/17/2021 [facility]    MR NECK ANGIO WO IV CONTRAST  3/25/2023    MR NECK ANGIO WO IV CONTRAST [facility]    OTHER SURGICAL HISTORY  01/29/2015    Creation Of Pericardial Window    OTHER SURGICAL HISTORY  01/29/2015    Quadricepsplasty    TOTAL HIP ARTHROPLASTY  01/29/2015    Hip Replacement      Medications Prior to Admission   Medication Sig Dispense Refill Last Dose    apixaban (Eliquis) 2.5 mg tablet Take 1 tablet (2.5 mg) by mouth 2 times a day. 60 tablet 1 5/7/2024 at 0900    fluticasone-umeclidin-vilanter (TRELEGY-ELLIPTA) 200-62.5-25 mcg blister with device Inhale 1 puff once daily. 60 each 5 5/7/2024 at 0900    folic acid (Folvite) 1 mg tablet TAKE 1 TABLET BY MOUTH EVERY DAY 90 tablet 1 5/7/2024 at 0900    glucagon HCL (Glucagon, HCl, Emergency Kit) 1 mg recon soln Inject 1 mg into the muscle if needed.   5/7/2024 at 1225    guaiFENesin (Mucinex) 600 mg 12 hr tablet Take 2 tablets (1,200 mg) by mouth 2 times a day. Do not crush, chew, or split.   5/7/2024 at 0900    insulin glargine (Lantus U-100 Insulin) 100 unit/mL injection Inject 10 Units under the skin once daily in the morning. Take as directed per insulin instructions.   5/7/2024 at 0800    insulin lispro (HumaLOG) 100 unit/mL injection Inject under the skin 3 times a day as needed for high blood sugar (before meals). Take as directed per insulin Sliding Scale instructions.  0-150 = 0 units  151-200 = 2 units  201-250 = 4 units  251-300 = 6 units  301-350 = 8 units  351-400 = 10 units  >400 = give 10 units and contact MD   5/7/2024 at 0730- 8 UNITS GIVEN    levETIRAcetam (Keppra) 500 mg tablet Take 1 tablet (500 mg) by mouth every 12 hours. 60 tablet 0 5/7/2024 at 0900    magnesium oxide (Mag-Ox) 400 mg (241.3 mg magnesium) tablet Take 2 tablets (800 mg) by mouth once daily. Do not fill before May 1, 2024.   5/7/2024 at 0900    multivitamin with minerals tablet Take 1 tablet by mouth once daily.   5/7/2024 at 0900    mycophenolate (Cellcept) 500 mg tablet TAKE 2 TABLETS BY MOUTH TWICE A  tablet 0 5/7/2024 at 0900    nut.tx.gluc intol,lf,soy/fiber (BOOST GLUCOSE CONTROL ORAL) Take 8 fluid ounces by mouth once daily in the morning.   5/7/2024 at 0900    OLANZapine (ZyPREXA) 5 mg tablet Take 1 tablet (5 mg) by mouth 2 times a day.    "5/7/2024 at 0900    pantoprazole (ProtoNix) 40 mg EC tablet TAKE 1 TABLET BY MOUTH EVERY DAY 90 tablet 1 5/7/2024 at 0800    predniSONE (Deltasone) 5 mg tablet Take 3 tablets (15 mg) by mouth once daily.   5/7/2024 at 0900    thiamine 100 mg tablet Take 1 tablet (100 mg) by mouth once daily.   5/7/2024 at 0900    acetaminophen (Tylenol) 325 mg tablet Take 2 tablets (650 mg) by mouth every 4 hours if needed for mild pain (1 - 3), moderate pain (4 - 6) or fever (temp greater than 38.0 C).   5/2/2024    acetaminophen (Tylenol) 500 mg tablet Take 2 tablets (1,000 mg) by mouth every 4 hours if needed for mild pain (1 - 3) or moderate pain (4 - 6).   Unknown    albuterol 90 mcg/actuation inhaler Inhale 2 puffs every 6 hours if needed for shortness of breath or wheezing.   Unknown    atorvastatin (Lipitor) 40 mg tablet Take 1 tablet (40 mg) by mouth once daily. (Patient taking differently: Take 1 tablet (40 mg) by mouth once daily at bedtime.) 90 tablet 3 5/6/2024 at 2100    balsam peru-castor oiL (Venelex) ointment Apply topically 2 times a day. APPLY TO BUTTOCKS, SACRUM, AND THIGHS.   5/6/2024 at PM    blood-glucose sensor (Dexcom G6 Sensor) device Use to check sugars 3 times daily 4 each 2     Dexcom G4 platinum  (Dexcom G6 ) misc Use as instructed 1 each 0     Dexcom G4 platinum transmitter (Dexcom G6 Transmitter) device Use as instructed 1 each 0     meclizine (Antivert) 25 mg tablet Take 1 tablet (25 mg) by mouth every 12 hours if needed for dizziness.   Unknown    melatonin 5 mg tablet Take 1 tablet (5 mg) by mouth once daily at bedtime.   5/6/2024 at 2100    pen needle, diabetic 31 gauge x 5/16\" needle Use to inject 1-4 times daily as directed. 100 each 11      Ace inhibitors, Hydroxychloroquine, Lisinopril, Penicillins, and Sulfa (sulfonamide antibiotics)  Social History     Tobacco Use    Smoking status: Never     Passive exposure: Never    Smokeless tobacco: Never   Vaping Use    Vaping status: " Never Used   Substance Use Topics    Alcohol use: Not Currently     Comment: RARE    Drug use: Never     Family History   Problem Relation Name Age of Onset    Other (RENAL DISEASE) Mother          END STAGE    Other (CARDIAC DISORDER) Mother      Cataracts Mother      Stroke Mother      Diabetes Mother      Kidney disease Mother      Lupus Mother      Other (CARDIAC DISORDER) Father      COPD Father      Glaucoma Father      Hypertension Father      Sleep apnea Father      Other (CARDIAC DISORDER) Sister      Depression Sister      Kidney disease Sister      Sickle cell trait Sister      Sleep apnea Daughter         Review of Systems:  14 point review of systems was completed and negative except for those specially mention in my HPI    Physical Exam:    Heart Rate:  [46-88]   Temp:  [35.9 °C (96.6 °F)-36 °C (96.8 °F)]   Resp:  [15-24]   BP: (134-186)/()   SpO2:  [93 %-100 %]     Physical Exam  Vitals and nursing note reviewed.   Constitutional:       Appearance: She is ill-appearing.   HENT:      Head: Normocephalic and atraumatic.      Nose: Nose normal.      Mouth/Throat:      Mouth: Mucous membranes are dry.   Eyes:      Extraocular Movements: Extraocular movements intact.      Pupils: Pupils are equal, round, and reactive to light.   Cardiovascular:      Rate and Rhythm: Normal rate. Rhythm irregular.   Pulmonary:      Effort: Pulmonary effort is normal.      Breath sounds: Normal breath sounds.   Abdominal:      General: Abdomen is flat. There is no distension.      Tenderness: There is no abdominal tenderness.   Musculoskeletal:      Cervical back: Normal range of motion.      Right lower leg: No edema.      Left lower leg: No edema.   Skin:     General: Skin is warm and dry.      Capillary Refill: Capillary refill takes less than 2 seconds.   Neurological:      General: No focal deficit present.      Mental Status: She is alert. She is disoriented.         Objective:    ECG 12 Lead    Result Date:  5/15/2024  Sinus bradycardia with Premature atrial complexes Voltage criteria for left ventricular hypertrophy Abnormal ECG When compared with ECG of 14-MAY-2024 07:04, (unconfirmed) Premature atrial complexes are now Present    ECG 12 Lead    Result Date: 5/14/2024  Sinus bradycardia Possible Left atrial enlargement Left ventricular hypertrophy Nonspecific ST abnormality Abnormal ECG When compared with ECG of 13-MAY-2024 06:33, (unconfirmed) No significant change was found    Bedside Midline Imaging    Result Date: 5/13/2024  These images are not reportable by radiology and will not be interpreted by  Radiologists.      Results for orders placed or performed during the hospital encounter of 05/07/24 (from the past 24 hour(s))   POCT GLUCOSE   Result Value Ref Range    POCT Glucose 262 (H) 74 - 99 mg/dL   Basic Metabolic Panel   Result Value Ref Range    Glucose 208 (H) 74 - 99 mg/dL    Sodium 148 (H) 136 - 145 mmol/L    Potassium 3.8 3.5 - 5.3 mmol/L    Chloride 110 (H) 98 - 107 mmol/L    Bicarbonate 32 21 - 32 mmol/L    Anion Gap 10 10 - 20 mmol/L    Urea Nitrogen 22 6 - 23 mg/dL    Creatinine 1.66 (H) 0.50 - 1.05 mg/dL    eGFR 35 (L) >60 mL/min/1.73m*2    Calcium 8.1 (L) 8.6 - 10.3 mg/dL   POCT GLUCOSE   Result Value Ref Range    POCT Glucose 199 (H) 74 - 99 mg/dL   CBC and Auto Differential   Result Value Ref Range    WBC 13.4 (H) 4.4 - 11.3 x10*3/uL    nRBC 3.8 (H) 0.0 - 0.0 /100 WBCs    RBC 2.61 (L) 4.00 - 5.20 x10*6/uL    Hemoglobin 7.9 (L) 12.0 - 16.0 g/dL    Hematocrit 25.6 (L) 36.0 - 46.0 %    MCV 98 80 - 100 fL    MCH 30.3 26.0 - 34.0 pg    MCHC 30.9 (L) 32.0 - 36.0 g/dL    RDW 18.1 (H) 11.5 - 14.5 %    Platelets 84 (L) 150 - 450 x10*3/uL    Immature Granulocytes %, Automated 11.8 (H) 0.0 - 0.9 %    Immature Granulocytes Absolute, Automated 1.59 (H) 0.00 - 0.70 x10*3/uL   Basic Metabolic Panel   Result Value Ref Range    Glucose 336 (H) 74 - 99 mg/dL    Sodium 149 (H) 136 - 145 mmol/L    Potassium 3.4  (L) 3.5 - 5.3 mmol/L    Chloride 110 (H) 98 - 107 mmol/L    Bicarbonate 34 (H) 21 - 32 mmol/L    Anion Gap 8 (L) 10 - 20 mmol/L    Urea Nitrogen 22 6 - 23 mg/dL    Creatinine 1.47 (H) 0.50 - 1.05 mg/dL    eGFR 40 (L) >60 mL/min/1.73m*2    Calcium 7.8 (L) 8.6 - 10.3 mg/dL   Magnesium   Result Value Ref Range    Magnesium 1.70 1.60 - 2.40 mg/dL   Manual Differential   Result Value Ref Range    Neutrophils %, Manual 88.0 40.0 - 80.0 %    Bands %, Manual 5.0 0.0 - 5.0 %    Lymphocytes %, Manual 1.0 13.0 - 44.0 %    Monocytes %, Manual 2.0 2.0 - 10.0 %    Eosinophils %, Manual 0.0 0.0 - 6.0 %    Basophils %, Manual 0.0 0.0 - 2.0 %    Metamyelocytes %, Manual 1.0 0.0 - 0.0 %    Myelocytes %, Manual 3.0 0.0 - 0.0 %    Seg Neutrophils Absolute, Manual 11.79 (H) 1.20 - 7.00 x10*3/uL    Bands Absolute, Manual 0.67 0.00 - 0.70 x10*3/uL    Lymphocytes Absolute, Manual 0.13 (L) 1.20 - 4.80 x10*3/uL    Monocytes Absolute, Manual 0.27 0.10 - 1.00 x10*3/uL    Eosinophils Absolute, Manual 0.00 0.00 - 0.70 x10*3/uL    Basophils Absolute, Manual 0.00 0.00 - 0.10 x10*3/uL    Metamyelocytes Absolute, Manual 0.13 0.00 - 0.00 x10*3/uL    Myelocytes Absolute, Manual 0.40 0.00 - 0.00 x10*3/uL    Total Cells Counted 100     Neutrophils Absolute, Manual 12.46 (H) 1.20 - 7.70 x10*3/uL    RBC Morphology See Below     Polychromasia Mild     Ovalocytes Few     Teardrop Cells Few     Katarzyna Cells Few     Basophilic Stippling Present    ECG 12 Lead   Result Value Ref Range    Ventricular Rate 54 BPM    Atrial Rate 54 BPM    IA Interval 132 ms    QRS Duration 106 ms    QT Interval 454 ms    QTC Calculation(Bazett) 430 ms    P Axis 54 degrees    R Axis -13 degrees    T Axis -26 degrees    QRS Count 8 beats    Q Onset 215 ms    P Onset 149 ms    P Offset 205 ms    T Offset 442 ms    QTC Fredericia 438 ms        Assessment/Plan:    I am currently managing this critically ill patient for the following problems:    Acute encephalopathy: Multiple  presentations of the same, believed to be due to adrenal insufficiency.    History of lupus cerebritis  History of seizure (believed to be focal seizure in nature based on chart review)  History of coronary artery disease  History of atrial fibrillation on Eliquis  Hx of heart failure with reduced EF not in acute exacerbation   History of GERD  Recurrent hypoglycemic episodes believed to be due to adrenal insufficiency - possibly due to med rec error.   History of type 2 diabetes now with mild hyperglycemia   History of adrenal insufficiency  Chronic anemia  History of lupus  Immune suppressed     Neuro/Psych/Pain Ctrl/Sedation:  History of lupus cerebritis  History of seizure (believed to be focal seizure in nature based on chart review)  Acute encephalopathy: Multiple presentations of the same, believed to be due to adrenal insufficiency.    Tylenol available for pain control  Resume Zyprexa ODT at night (takes BID normally)  Continue Keppra given her history of seizure, level therapeutic   EEG neg for seizure   Neurology following  LP negative, discontinue CNS coverage     Respiratory/ENT:  Maintain SpO2 greater than 90%, currently on room air  DuoNebs Q6  Home inhalers       Cardiovascular:  History of coronary artery disease  History of atrial fibrillation on Eliquis  Hx of heart failure with reduced EF not in acute exacerbation   Maintain MAP greater than 65, not currently on any vasoactive agents  Continue Statin  Has been unable to tolerate GDMT in the past due to allergies and other comorbidities  Bradycardia asymptomatic - observe for now   Echocardiogram with ejection fraction of 40 to 45%, unchanged from prior in May  Per EP will need outpatient eval for PPM when more stable, holding eliquis for now, thinking sometime this week   May resume eliquis if back in afib      GI:  Carb controlled diet per slp   Home PPI     Renal/Volume Status (Intra & Extravascular):  JED on CKD  Chronic hypernatremia   CR  1.47, bicarb 34, chloride 110,   Was given magnesium this morning  Trend RFP avoid nephrotoxic medications  Continue d4 water x 24 hours       Endocrine  History of type 2 diabetes now with mild hyperglycemia   History of adrenal insufficiency  History of lupus  Stress dose steroids 50 every 8 per Endo  Started Florinef per Endo  ENDO following for steroid dosing   Should be on at least 20mg Prednisone NOT 15mg when acute process resolved   Continue Lantus 5 units  Continue sliding scale  D5 water at 100ml/hr for additional 10 hours given metabolic panel   NS 1L bolus      Infectious Disease:  Immune suppressed  DC Abx/Acyclovir - CSF studies have been negative   Daily CBC  Monitor for SIRS  Started atovaquone for PJP prophylaxis     Heme/Onc:  History of chronic anemia  Transfuse for symptomatic anemia, no current indication   Holding eliquis per EP      Ethics/Code Status:  Full Code     :  DVT Prophylaxis: heparin subcu   GI Prophylaxis: Not indicated  Diet: per SLP   CVC: Removed now has midline  Anna: ANDREW  [place]: NA  Restraints: NA  Dispo: Floor versus ICU     I have personally spent 35 minutes of critical care time, exclusive of time spent on any procedures, in evaluation and management of this critically ill patient        Alla Arias, APRN-CNP

## 2024-05-16 ENCOUNTER — HOSPITAL ENCOUNTER (INPATIENT)
Dept: CARDIOLOGY | Facility: HOSPITAL | Age: 64
Discharge: HOME | DRG: 981 | End: 2024-05-16
Payer: MEDICARE

## 2024-05-16 ENCOUNTER — DOCUMENTATION (OUTPATIENT)
Dept: RESEARCH | Age: 64
End: 2024-05-16
Payer: MEDICARE

## 2024-05-16 VITALS
TEMPERATURE: 95.9 F | OXYGEN SATURATION: 97 % | HEART RATE: 52 BPM | SYSTOLIC BLOOD PRESSURE: 156 MMHG | DIASTOLIC BLOOD PRESSURE: 78 MMHG

## 2024-05-16 PROBLEM — R00.1 BRADYCARDIA, UNSPECIFIED: Status: ACTIVE | Noted: 2024-05-07

## 2024-05-16 LAB
ABO GROUP (TYPE) IN BLOOD: NORMAL
ANION GAP SERPL CALC-SCNC: 8 MMOL/L (ref 10–20)
ANTIBODY SCREEN: NORMAL
BASOPHILS # BLD MANUAL: 0 X10*3/UL (ref 0–0.1)
BASOPHILS NFR BLD MANUAL: 0 %
BLOOD EXPIRATION DATE: NORMAL
BUN SERPL-MCNC: 19 MG/DL (ref 6–23)
CALCIUM SERPL-MCNC: 7.8 MG/DL (ref 8.6–10.3)
CHLORIDE SERPL-SCNC: 107 MMOL/L (ref 98–107)
CO2 SERPL-SCNC: 36 MMOL/L (ref 21–32)
CORTIS 1H P CHAL SERPL-MCNC: 28.7 UG/DL
CORTIS 30M P CHAL SERPL-MCNC: 27.7 UG/DL
CORTIS BS SERPL-MCNC: 29.4 UG/DL
CREAT SERPL-MCNC: 1.38 MG/DL (ref 0.5–1.05)
DISPENSE STATUS: NORMAL
EGFRCR SERPLBLD CKD-EPI 2021: 43 ML/MIN/1.73M*2
EOSINOPHIL # BLD MANUAL: 0 X10*3/UL (ref 0–0.7)
EOSINOPHIL NFR BLD MANUAL: 0 %
ERYTHROCYTE [DISTWIDTH] IN BLOOD BY AUTOMATED COUNT: 18.3 % (ref 11.5–14.5)
ERYTHROCYTE [DISTWIDTH] IN BLOOD BY AUTOMATED COUNT: 19.2 % (ref 11.5–14.5)
GLUCOSE BLD MANUAL STRIP-MCNC: 150 MG/DL (ref 74–99)
GLUCOSE BLD MANUAL STRIP-MCNC: 173 MG/DL (ref 74–99)
GLUCOSE BLD MANUAL STRIP-MCNC: 221 MG/DL (ref 74–99)
GLUCOSE BLD MANUAL STRIP-MCNC: 286 MG/DL (ref 74–99)
GLUCOSE SERPL-MCNC: 259 MG/DL (ref 74–99)
HCT VFR BLD AUTO: 26.4 % (ref 36–46)
HCT VFR BLD AUTO: 34 % (ref 36–46)
HGB BLD-MCNC: 10.6 G/DL (ref 12–16)
HGB BLD-MCNC: 8 G/DL (ref 12–16)
IMM GRANULOCYTES # BLD AUTO: 1.16 X10*3/UL (ref 0–0.7)
IMM GRANULOCYTES NFR BLD AUTO: 9.7 % (ref 0–0.9)
LYMPHOCYTES # BLD MANUAL: 0.71 X10*3/UL (ref 1.2–4.8)
LYMPHOCYTES NFR BLD MANUAL: 6 %
MAGNESIUM SERPL-MCNC: 1.7 MG/DL (ref 1.6–2.4)
MCH RBC QN AUTO: 30.2 PG (ref 26–34)
MCH RBC QN AUTO: 30.4 PG (ref 26–34)
MCHC RBC AUTO-ENTMCNC: 30.3 G/DL (ref 32–36)
MCHC RBC AUTO-ENTMCNC: 31.2 G/DL (ref 32–36)
MCV RBC AUTO: 100 FL (ref 80–100)
MCV RBC AUTO: 97 FL (ref 80–100)
MONOCYTES # BLD MANUAL: 0.24 X10*3/UL (ref 0.1–1)
MONOCYTES NFR BLD MANUAL: 2 %
NEUTROPHILS # BLD MANUAL: 10.95 X10*3/UL (ref 1.2–7.7)
NEUTS BAND # BLD MANUAL: 0.95 X10*3/UL (ref 0–0.7)
NEUTS BAND NFR BLD MANUAL: 8 %
NEUTS SEG # BLD MANUAL: 10 X10*3/UL (ref 1.2–7)
NEUTS SEG NFR BLD MANUAL: 84 %
NRBC BLD-RTO: 3 /100 WBCS (ref 0–0)
NRBC BLD-RTO: 4.5 /100 WBCS (ref 0–0)
OVALOCYTES BLD QL SMEAR: ABNORMAL
PLATELET # BLD AUTO: 76 X10*3/UL (ref 150–450)
PLATELET # BLD AUTO: 79 X10*3/UL (ref 150–450)
POLYCHROMASIA BLD QL SMEAR: ABNORMAL
POTASSIUM SERPL-SCNC: 3.7 MMOL/L (ref 3.5–5.3)
PRODUCT BLOOD TYPE: 5100
PRODUCT CODE: NORMAL
RBC # BLD AUTO: 2.63 X10*6/UL (ref 4–5.2)
RBC # BLD AUTO: 3.51 X10*6/UL (ref 4–5.2)
RBC MORPH BLD: ABNORMAL
RH FACTOR (ANTIGEN D): NORMAL
SODIUM SERPL-SCNC: 147 MMOL/L (ref 136–145)
TOTAL CELLS COUNTED BLD: 100
UNIT ABO: NORMAL
UNIT NUMBER: NORMAL
UNIT RH: NORMAL
UNIT VOLUME: 350
WBC # BLD AUTO: 11.3 X10*3/UL (ref 4.4–11.3)
WBC # BLD AUTO: 11.9 X10*3/UL (ref 4.4–11.3)
XM INTEP: NORMAL

## 2024-05-16 PROCEDURE — 93010 ELECTROCARDIOGRAM REPORT: CPT | Performed by: INTERNAL MEDICINE

## 2024-05-16 PROCEDURE — 85007 BL SMEAR W/DIFF WBC COUNT: CPT | Performed by: INTERNAL MEDICINE

## 2024-05-16 PROCEDURE — 93005 ELECTROCARDIOGRAM TRACING: CPT

## 2024-05-16 PROCEDURE — 2500000006 HC RX 250 W HCPCS SELF ADMINISTERED DRUGS (ALT 637 FOR ALL PAYERS): Performed by: NURSE PRACTITIONER

## 2024-05-16 PROCEDURE — 2500000004 HC RX 250 GENERAL PHARMACY W/ HCPCS (ALT 636 FOR OP/ED): Performed by: INTERNAL MEDICINE

## 2024-05-16 PROCEDURE — 2500000001 HC RX 250 WO HCPCS SELF ADMINISTERED DRUGS (ALT 637 FOR MEDICARE OP): Performed by: INTERNAL MEDICINE

## 2024-05-16 PROCEDURE — 86901 BLOOD TYPING SEROLOGIC RH(D): CPT | Performed by: INTERNAL MEDICINE

## 2024-05-16 PROCEDURE — P9016 RBC LEUKOCYTES REDUCED: HCPCS

## 2024-05-16 PROCEDURE — 86920 COMPATIBILITY TEST SPIN: CPT

## 2024-05-16 PROCEDURE — 83735 ASSAY OF MAGNESIUM: CPT | Performed by: INTERNAL MEDICINE

## 2024-05-16 PROCEDURE — 36430 TRANSFUSION BLD/BLD COMPNT: CPT

## 2024-05-16 PROCEDURE — 80400 ACTH STIMULATION PANEL: CPT | Mod: ELYLAB | Performed by: INTERNAL MEDICINE

## 2024-05-16 PROCEDURE — 1210000001 HC SEMI-PRIVATE ROOM DAILY

## 2024-05-16 PROCEDURE — 36415 COLL VENOUS BLD VENIPUNCTURE: CPT | Performed by: INTERNAL MEDICINE

## 2024-05-16 PROCEDURE — 99233 SBSQ HOSP IP/OBS HIGH 50: CPT | Performed by: INTERNAL MEDICINE

## 2024-05-16 PROCEDURE — 2500000006 HC RX 250 W HCPCS SELF ADMINISTERED DRUGS (ALT 637 FOR ALL PAYERS): Mod: MUE | Performed by: INTERNAL MEDICINE

## 2024-05-16 PROCEDURE — 82533 TOTAL CORTISOL: CPT | Mod: ELYLAB | Performed by: INTERNAL MEDICINE

## 2024-05-16 PROCEDURE — 85027 COMPLETE CBC AUTOMATED: CPT | Performed by: INTERNAL MEDICINE

## 2024-05-16 PROCEDURE — 2500000004 HC RX 250 GENERAL PHARMACY W/ HCPCS (ALT 636 FOR OP/ED): Performed by: NURSE PRACTITIONER

## 2024-05-16 PROCEDURE — 2500000006 HC RX 250 W HCPCS SELF ADMINISTERED DRUGS (ALT 637 FOR ALL PAYERS): Performed by: INTERNAL MEDICINE

## 2024-05-16 PROCEDURE — 2500000004 HC RX 250 GENERAL PHARMACY W/ HCPCS (ALT 636 FOR OP/ED)

## 2024-05-16 PROCEDURE — 82374 ASSAY BLOOD CARBON DIOXIDE: CPT | Performed by: INTERNAL MEDICINE

## 2024-05-16 PROCEDURE — 82947 ASSAY GLUCOSE BLOOD QUANT: CPT | Mod: 91

## 2024-05-16 PROCEDURE — 2500000002 HC RX 250 W HCPCS SELF ADMINISTERED DRUGS (ALT 637 FOR MEDICARE OP, ALT 636 FOR OP/ED): Performed by: INTERNAL MEDICINE

## 2024-05-16 RX ORDER — LANOLIN ALCOHOL/MO/W.PET/CERES
400 CREAM (GRAM) TOPICAL DAILY
Status: DISCONTINUED | OUTPATIENT
Start: 2024-05-16 | End: 2024-05-18 | Stop reason: HOSPADM

## 2024-05-16 RX ORDER — DEXTROSE MONOHYDRATE 50 MG/ML
100 INJECTION, SOLUTION INTRAVENOUS CONTINUOUS
Status: DISCONTINUED | OUTPATIENT
Start: 2024-05-16 | End: 2024-05-16

## 2024-05-16 RX ORDER — SODIUM CHLORIDE 9 MG/ML
20 INJECTION, SOLUTION INTRAVENOUS CONTINUOUS
Status: DISCONTINUED | OUTPATIENT
Start: 2024-05-17 | End: 2024-05-17

## 2024-05-16 RX ORDER — POTASSIUM CHLORIDE 20 MEQ/1
20 TABLET, EXTENDED RELEASE ORAL ONCE
Status: COMPLETED | OUTPATIENT
Start: 2024-05-16 | End: 2024-05-16

## 2024-05-16 RX ORDER — MUPIROCIN 20 MG/G
1 OINTMENT TOPICAL ONCE
Qty: 1 G | Refills: 0 | Status: COMPLETED | OUTPATIENT
Start: 2024-05-17 | End: 2024-05-17

## 2024-05-16 RX ORDER — COSYNTROPIN 0.25 MG/ML
250 INJECTION, POWDER, FOR SOLUTION INTRAMUSCULAR; INTRAVENOUS ONCE
Status: COMPLETED | OUTPATIENT
Start: 2024-05-16 | End: 2024-05-16

## 2024-05-16 RX ORDER — HYDROCORTISONE 5 MG/1
20 TABLET ORAL DAILY
Status: DISCONTINUED | OUTPATIENT
Start: 2024-05-16 | End: 2024-05-18 | Stop reason: HOSPADM

## 2024-05-16 RX ORDER — MAGNESIUM SULFATE HEPTAHYDRATE 40 MG/ML
2 INJECTION, SOLUTION INTRAVENOUS ONCE
Status: COMPLETED | OUTPATIENT
Start: 2024-05-16 | End: 2024-05-16

## 2024-05-16 RX ORDER — VANCOMYCIN HYDROCHLORIDE 1 G/200ML
1000 INJECTION, SOLUTION INTRAVENOUS ONCE
Status: COMPLETED | OUTPATIENT
Start: 2024-05-17 | End: 2024-05-17

## 2024-05-16 RX ORDER — PANTOPRAZOLE SODIUM 40 MG/1
40 TABLET, DELAYED RELEASE ORAL
Status: DISCONTINUED | OUTPATIENT
Start: 2024-05-16 | End: 2024-05-18 | Stop reason: HOSPADM

## 2024-05-16 RX ORDER — HYDROCORTISONE 5 MG/1
10 TABLET ORAL NIGHTLY
Status: DISCONTINUED | OUTPATIENT
Start: 2024-05-16 | End: 2024-05-18 | Stop reason: HOSPADM

## 2024-05-16 RX ORDER — CHLORHEXIDINE GLUCONATE 40 MG/ML
SOLUTION TOPICAL ONCE
Status: COMPLETED | OUTPATIENT
Start: 2024-05-17 | End: 2024-05-17

## 2024-05-16 RX ADMIN — MYCOPHENOLATE MOFETIL 1000 MG: 250 CAPSULE ORAL at 09:32

## 2024-05-16 RX ADMIN — HYDROCORTISONE 10 MG: 5 TABLET ORAL at 21:14

## 2024-05-16 RX ADMIN — MYCOPHENOLATE MOFETIL 1000 MG: 250 CAPSULE ORAL at 21:15

## 2024-05-16 RX ADMIN — FOLIC ACID 1 MG: 1 TABLET ORAL at 09:32

## 2024-05-16 RX ADMIN — Medication 5 MG: at 21:14

## 2024-05-16 RX ADMIN — INSULIN GLARGINE 10 UNITS: 100 INJECTION, SOLUTION SUBCUTANEOUS at 09:32

## 2024-05-16 RX ADMIN — MAGNESIUM OXIDE 400 MG (241.3 MG MAGNESIUM) TABLET 400 MG: TABLET at 09:43

## 2024-05-16 RX ADMIN — Medication 100 MG: at 09:32

## 2024-05-16 RX ADMIN — FLUDROCORTISONE ACETATE 0.1 MG: 0.1 TABLET ORAL at 09:32

## 2024-05-16 RX ADMIN — ATORVASTATIN CALCIUM 40 MG: 20 TABLET, FILM COATED ORAL at 21:14

## 2024-05-16 RX ADMIN — COSYNTROPIN 250 MCG: 0.25 INJECTION, POWDER, LYOPHILIZED, FOR SOLUTION INTRAMUSCULAR; INTRAVENOUS at 16:29

## 2024-05-16 RX ADMIN — INSULIN LISPRO 6 UNITS: 100 INJECTION, SOLUTION INTRAVENOUS; SUBCUTANEOUS at 09:34

## 2024-05-16 RX ADMIN — DEXTROSE MONOHYDRATE 100 ML/HR: 50 INJECTION, SOLUTION INTRAVENOUS at 09:33

## 2024-05-16 RX ADMIN — OLANZAPINE 5 MG: 10 TABLET, ORALLY DISINTEGRATING ORAL at 21:15

## 2024-05-16 RX ADMIN — POTASSIUM CHLORIDE 20 MEQ: 1500 TABLET, EXTENDED RELEASE ORAL at 09:32

## 2024-05-16 RX ADMIN — LEVETIRACETAM 500 MG: 500 TABLET, FILM COATED ORAL at 21:14

## 2024-05-16 RX ADMIN — MAGNESIUM SULFATE HEPTAHYDRATE 2 G: 40 INJECTION, SOLUTION INTRAVENOUS at 11:01

## 2024-05-16 RX ADMIN — HEPARIN SODIUM 5000 UNITS: 5000 INJECTION INTRAVENOUS; SUBCUTANEOUS at 16:29

## 2024-05-16 RX ADMIN — FLUTICASONE FUROATE AND VILANTEROL TRIFENATATE 1 PUFF: 200; 25 POWDER RESPIRATORY (INHALATION) at 06:18

## 2024-05-16 RX ADMIN — INSULIN LISPRO 9 UNITS: 100 INJECTION, SOLUTION INTRAVENOUS; SUBCUTANEOUS at 13:27

## 2024-05-16 RX ADMIN — HEPARIN SODIUM 5000 UNITS: 5000 INJECTION INTRAVENOUS; SUBCUTANEOUS at 09:32

## 2024-05-16 RX ADMIN — HYDROCORTISONE 20 MG: 5 TABLET ORAL at 09:31

## 2024-05-16 RX ADMIN — LEVETIRACETAM 500 MG: 500 TABLET, FILM COATED ORAL at 09:32

## 2024-05-16 RX ADMIN — HEPARIN SODIUM 5000 UNITS: 5000 INJECTION INTRAVENOUS; SUBCUTANEOUS at 00:13

## 2024-05-16 RX ADMIN — PANTOPRAZOLE SODIUM 40 MG: 40 TABLET, DELAYED RELEASE ORAL at 13:27

## 2024-05-16 RX ADMIN — TIOTROPIUM BROMIDE INHALATION SPRAY 2 PUFF: 3.12 SPRAY, METERED RESPIRATORY (INHALATION) at 06:18

## 2024-05-16 RX ADMIN — ATOVAQUONE 1500 MG: 750 SUSPENSION ORAL at 09:43

## 2024-05-16 RX ADMIN — DEXTROSE MONOHYDRATE 100 ML/HR: 50 INJECTION, SOLUTION INTRAVENOUS at 04:09

## 2024-05-16 RX ADMIN — HYDRALAZINE HYDROCHLORIDE 10 MG: 20 INJECTION INTRAMUSCULAR; INTRAVENOUS at 05:33

## 2024-05-16 RX ADMIN — Medication 1 TABLET: at 09:32

## 2024-05-16 ASSESSMENT — PAIN - FUNCTIONAL ASSESSMENT: PAIN_FUNCTIONAL_ASSESSMENT: 0-10

## 2024-05-16 ASSESSMENT — COGNITIVE AND FUNCTIONAL STATUS - GENERAL
PERSONAL GROOMING: A LOT
TOILETING: TOTAL
CLIMB 3 TO 5 STEPS WITH RAILING: TOTAL
DRESSING REGULAR LOWER BODY CLOTHING: A LOT
TURNING FROM BACK TO SIDE WHILE IN FLAT BAD: A LOT
HELP NEEDED FOR BATHING: A LOT
MOVING TO AND FROM BED TO CHAIR: TOTAL
DRESSING REGULAR UPPER BODY CLOTHING: A LOT
STANDING UP FROM CHAIR USING ARMS: TOTAL
MOVING FROM LYING ON BACK TO SITTING ON SIDE OF FLAT BED WITH BEDRAILS: A LOT
MOBILITY SCORE: 8
DAILY ACTIVITIY SCORE: 13
WALKING IN HOSPITAL ROOM: TOTAL

## 2024-05-16 ASSESSMENT — PAIN SCALES - GENERAL
PAINLEVEL_OUTOF10: 0 - NO PAIN
PAINLEVEL_OUTOF10: 0 - NO PAIN

## 2024-05-16 NOTE — PROGRESS NOTES
ASSESSMENT & PLAN:     Adrenal insufficiency - 2/2 chronic steroid use for lupus. Was initially in ICU. Improved with IV steroid, PO fludrocortisone. Endo following. Transitioning to PO hydrocortisone today. Cont PO fludrocortisone. Cont atovaquone for pjp ppx (sulfa allergy), ppi for gi ppx.     Hx of Afib on OAC, bradycardia, sinus node dysfunction - EP following. Plan for PPM placement reportedly this admission. Avoid AV camille blocking agents for now. Keep eliquis held. Tele monitoring.     HTN - has been labile here, was running higher likely from steroids and florinef, now stabilizing, ctm    Hypernatremia - chronic, seems to always be slightly elevated. DC D5W today. Encourage PO water intake. Recheck BMP in AM.     CKD3 - Scr at baseline, monitor    Acute metabolic encephalopathy - likely 2/2 adrenal insufficiency, was followed by neuro, extensive lawrence was done in ICU and all negative. Now resolved and A+Ox4.      SLE - cont pta cellcept, pta steroids changed to hydrocortisone for now as above    COPD - no pfts on file, not in acute exacerbation, cont home inhalers    CAD s/p CABG (2013), DLD - cont statin, unclear why taken off aspirin, not mentioned in recent cards note, possibly 2/2 thrombocytopenia    HFmrEF - TTE from 5/2024 showed LVEF 40-45%. Currently compensated. Has been unable to tolerate GDMT in the past due to allergies and co-morbidities. Outpt fu with cardiology.     Hx of psychosis with visual hallucinations - cont zyprexa nightly, tolerating well    Focal seizures - home Keppra    Leukocytosis - steroid induced    Chronic anemia, thrombocytopenia - stable, no active bleeding, no indication for transfusion    DM - ?steroid induced and on D5W currently. Cont basal bolus insulin as ordered    GERD - ppi    Vte ppx sqh  Dispo is snf, ltc resident    Mitesh Agarwal MD    SUBJECTIVE     Transferred out of ICU yesterday. No acute complaints today. A+Ox4.       OBJECTIVE:       Last Recorded  Vitals:  Vitals:    05/15/24 1856 05/15/24 1930 05/16/24 0529 05/16/24 0923   BP: 165/79 166/86 (!) 193/91 149/68   BP Location:  Right arm  Right arm   Patient Position:  Lying  Lying   Pulse: 63 54 (!) 46 53   Resp: 18 18  18   Temp: 35.6 °C (96.1 °F) 36.5 °C (97.7 °F) 35.5 °C (95.9 °F) 35.4 °C (95.7 °F)   TempSrc:  Temporal  Temporal   SpO2: 93% 99% 99% 99%   Weight:       Height:           Last I/O:  I/O last 3 completed shifts:  In: 4362 (42.8 mL/kg) [P.O.:600; I.V.:2762 (27.1 mL/kg); IV Piggyback:1000]  Out: 4400 (43.1 mL/kg) [Urine:4400 (1.2 mL/kg/hr)]  Weight: 102 kg     Physical Exam:  GEN: appears stated age, NAD  CV: RRR, no m/r/g, no LE edema  LUNGS: CTAB  ABD: soft, NT  NEURO: A+Ox4, no FND  PSYCH: appropriate mood, affect    Inpatient Medications:  [Held by provider] apixaban, 2.5 mg, oral, q12h  atorvastatin, 40 mg, oral, Daily  atovaquone, 1,500 mg, oral, Daily  fludrocortisone, 0.1 mg, oral, Daily  tiotropium, 2 puff, inhalation, Daily   And  fluticasone furoate-vilanteroL, 1 puff, inhalation, Daily  folic acid, 1 mg, oral, Daily  heparin (porcine), 5,000 Units, subcutaneous, q8h  hydrocortisone, 10 mg, oral, Nightly  hydrocortisone, 20 mg, oral, Daily  insulin glargine, 10 Units, subcutaneous, q AM  insulin lispro, 0-15 Units, subcutaneous, TID  levETIRAcetam, 500 mg, oral, BID  magnesium oxide, 400 mg, oral, Daily  magnesium sulfate, 2 g, intravenous, Once  melatonin, 5 mg, oral, Nightly  multivitamin with minerals, 1 tablet, oral, Daily  mycophenolate, 1,000 mg, oral, BID  OLANZapine zydis, 5 mg, oral, Nightly  thiamine, 100 mg, oral, Daily        PRN Medications  PRN medications: acetaminophen **OR** acetaminophen, alteplase, dextrose, dextrose, glucagon, hydrALAZINE, ipratropium-albuteroL, ipratropium-albuteroL, ondansetron, oxygen    Continuous Medications:  dextrose 5 % in water (D5W), 100 mL/hr, Last Rate: 100 mL/hr (05/16/24 0925)          LABS AND IMAGING:     Labs:  Results for orders  placed or performed during the hospital encounter of 05/07/24 (from the past 24 hour(s))   POCT GLUCOSE   Result Value Ref Range    POCT Glucose 207 (H) 74 - 99 mg/dL   Basic Metabolic Panel   Result Value Ref Range    Glucose 121 (H) 74 - 99 mg/dL    Sodium 148 (H) 136 - 145 mmol/L    Potassium 3.2 (L) 3.5 - 5.3 mmol/L    Chloride 109 (H) 98 - 107 mmol/L    Bicarbonate 33 (H) 21 - 32 mmol/L    Anion Gap 9 (L) 10 - 20 mmol/L    Urea Nitrogen 21 6 - 23 mg/dL    Creatinine 1.44 (H) 0.50 - 1.05 mg/dL    eGFR 41 (L) >60 mL/min/1.73m*2    Calcium 7.8 (L) 8.6 - 10.3 mg/dL   POCT GLUCOSE   Result Value Ref Range    POCT Glucose 122 (H) 74 - 99 mg/dL   POCT GLUCOSE   Result Value Ref Range    POCT Glucose 205 (H) 74 - 99 mg/dL   ECG 12 Lead   Result Value Ref Range    Ventricular Rate 47 BPM    Atrial Rate 47 BPM    NH Interval 138 ms    QRS Duration 92 ms    QT Interval 476 ms    QTC Calculation(Bazett) 421 ms    P Axis 57 degrees    R Axis -15 degrees    T Axis -34 degrees    QRS Count 8 beats    Q Onset 215 ms    P Onset 146 ms    P Offset 204 ms    T Offset 453 ms    QTC Fredericia 438 ms   CBC and Auto Differential   Result Value Ref Range    WBC 11.9 (H) 4.4 - 11.3 x10*3/uL    nRBC 3.0 (H) 0.0 - 0.0 /100 WBCs    RBC 2.63 (L) 4.00 - 5.20 x10*6/uL    Hemoglobin 8.0 (L) 12.0 - 16.0 g/dL    Hematocrit 26.4 (L) 36.0 - 46.0 %     80 - 100 fL    MCH 30.4 26.0 - 34.0 pg    MCHC 30.3 (L) 32.0 - 36.0 g/dL    RDW 18.3 (H) 11.5 - 14.5 %    Platelets 79 (L) 150 - 450 x10*3/uL    Immature Granulocytes %, Automated 9.7 (H) 0.0 - 0.9 %    Immature Granulocytes Absolute, Automated 1.16 (H) 0.00 - 0.70 x10*3/uL   Basic Metabolic Panel   Result Value Ref Range    Glucose 259 (H) 74 - 99 mg/dL    Sodium 147 (H) 136 - 145 mmol/L    Potassium 3.7 3.5 - 5.3 mmol/L    Chloride 107 98 - 107 mmol/L    Bicarbonate 36 (H) 21 - 32 mmol/L    Anion Gap 8 (L) 10 - 20 mmol/L    Urea Nitrogen 19 6 - 23 mg/dL    Creatinine 1.38 (H) 0.50 - 1.05  mg/dL    eGFR 43 (L) >60 mL/min/1.73m*2    Calcium 7.8 (L) 8.6 - 10.3 mg/dL   Magnesium   Result Value Ref Range    Magnesium 1.70 1.60 - 2.40 mg/dL   Manual Differential   Result Value Ref Range    Neutrophils %, Manual 84.0 40.0 - 80.0 %    Bands %, Manual 8.0 0.0 - 5.0 %    Lymphocytes %, Manual 6.0 13.0 - 44.0 %    Monocytes %, Manual 2.0 2.0 - 10.0 %    Eosinophils %, Manual 0.0 0.0 - 6.0 %    Basophils %, Manual 0.0 0.0 - 2.0 %    Seg Neutrophils Absolute, Manual 10.00 (H) 1.20 - 7.00 x10*3/uL    Bands Absolute, Manual 0.95 (H) 0.00 - 0.70 x10*3/uL    Lymphocytes Absolute, Manual 0.71 (L) 1.20 - 4.80 x10*3/uL    Monocytes Absolute, Manual 0.24 0.10 - 1.00 x10*3/uL    Eosinophils Absolute, Manual 0.00 0.00 - 0.70 x10*3/uL    Basophils Absolute, Manual 0.00 0.00 - 0.10 x10*3/uL    Total Cells Counted 100     Neutrophils Absolute, Manual 10.95 (H) 1.20 - 7.70 x10*3/uL    RBC Morphology See Below     Polychromasia Mild     Ovalocytes Few    ECG 12 Lead   Result Value Ref Range    Ventricular Rate 44 BPM    Atrial Rate 44 BPM    RI Interval 134 ms    QRS Duration 104 ms    QT Interval 502 ms    QTC Calculation(Bazett) 429 ms    P Axis 66 degrees    R Axis -10 degrees    T Axis -18 degrees    QRS Count 8 beats    Q Onset 217 ms    P Onset 150 ms    P Offset 209 ms    T Offset 468 ms    QTC Fredericia 452 ms   POCT GLUCOSE   Result Value Ref Range    POCT Glucose 221 (H) 74 - 99 mg/dL        Imaging:  ECG 12 Lead  Marked sinus bradycardia  Voltage criteria for left ventricular hypertrophy  Abnormal ECG  When compared with ECG of 15-MAY-2024 21:39, (unconfirmed)  ST no longer depressed in Anterior leads  T wave inversion no longer evident in Anterolateral leads  ECG 12 Lead  Sinus bradycardia  Voltage criteria for left ventricular hypertrophy  Nonspecific T wave abnormality  Abnormal ECG  When compared with ECG of 15-MAY-2024 21:38, (unconfirmed)  ST more depressed in Inferior leads

## 2024-05-16 NOTE — CARE PLAN
Problem: Pain  Goal: My pain/discomfort is manageable  Outcome: Progressing     Problem: Safety  Goal: Patient will be injury free during hospitalization  Outcome: Progressing  Goal: I will remain free of falls  Outcome: Progressing     Problem: Psychosocial Needs  Goal: Demonstrates ability to cope with hospitalization/illness  Outcome: Progressing  Goal: Collaborate with me, my family, and caregiver to identify my specific goals  Outcome: Progressing     Problem: Discharge Barriers  Goal: My discharge needs are met  Outcome: Progressing     Problem: Skin  Goal: Decreased wound size/increased tissue granulation at next dressing change  Outcome: Progressing  Flowsheets (Taken 5/15/2024 2257)  Decreased wound size/increased tissue granulation at next dressing change:   Promote sleep for wound healing   Protective dressings over bony prominences  Goal: Participates in plan/prevention/treatment measures  Outcome: Progressing  Flowsheets (Taken 5/15/2024 2257)  Participates in plan/prevention/treatment measures:   Elevate heels   Increase activity/out of bed for meals  Goal: Prevent/manage excess moisture  Outcome: Progressing  Flowsheets (Taken 5/15/2024 2257)  Prevent/manage excess moisture:   Follow provider orders for dressing changes   Monitor for/manage infection if present   Moisturize dry skin   Use wicking fabric (obtain order)  Goal: Prevent/minimize sheer/friction injuries  Outcome: Progressing  Flowsheets (Taken 5/15/2024 2257)  Prevent/minimize sheer/friction injuries:   Complete micro-shifts as needed if patient unable. Adjust patient position to relieve pressure points, not a full turn   HOB 30 degrees or less   Increase activity/out of bed for meals  Goal: Promote/optimize nutrition  Outcome: Progressing  Goal: Promote skin healing  Outcome: Progressing     Problem: Safety - Medical Restraint  Goal: Remains free of injury from restraints (Restraint for Interference with Medical Device)  Outcome:  Progressing  Goal: Free from restraint(s) (Restraint for Interference with Medical Device)  Outcome: Progressing       The patient's goals for the shift include      The clinical goals for the shift include Labs WNL

## 2024-05-16 NOTE — PROGRESS NOTES
05/16/24 1219   Discharge Planning   Type of Residence Nursing home/residential care   Type of Post Acute Facility Services Long term care   Patient expects to be discharged to: LTC @ [facility]/plan for return   Does the patient need discharge transport arranged? Yes   RoundTrip coordination needed? Yes   Patient Choice   Provider Choice list and CMS website (https://medicare.gov/care-compare#search) for post-acute Quality and Resource Measure Data were provided and reviewed with: Patient   Patient / Family choosing to utilize agency / facility established prior to hospitalization Yes     Met with pt, transferred from critical care. Pt confirmed plan for return to LTC @ [facility] on discharge. To have PPM placed. Care transitions team to cont to follow for dc needs & return to facility.

## 2024-05-16 NOTE — SIGNIFICANT EVENT
Hospital medicine clinical event note      Throughout night patient with heart rates 36-45, asymptomatic, EKG obtained

## 2024-05-16 NOTE — PROGRESS NOTES
05/16/24 1324   Current Planned Discharge Disposition   Current Planned Discharge Disposition Inter  ([facility])     Discharge disposition remains return to [facility]. Updated clinical information sent to the facility.

## 2024-05-16 NOTE — RESEARCH NOTES
Artificial Intelligence Monitoring in Nursing (AIMS Nursing) Study    Principle Investigator - Dr. Mark Vaughn  Research Coordinator - Kinza Colmenares RRT     Patient Name - Bing Holliday  Date - 5/16/2024 2:28 PM  Location - [place] [id number] Holliday was approached by Kinza Colmenares RRT to talk about participating in the AIMS Nursing Study. The consent form was signed by the patient and they were given a copy for their records. Study protocol was followed and patient was given study contact information.     Kinza Colmenares, RRT

## 2024-05-16 NOTE — CARE PLAN
The patient's goals for the shift include      The clinical goals for the shift include patient will have no episodes of bradycardia through out shift    Over the shift, the patient did not make progress toward the following goals. Barriers to progression include ***. Recommendations to address these barriers include ***.

## 2024-05-16 NOTE — PROGRESS NOTES
"    Cardiology Progress Note  [place] [facility] [place]Bed: 1107/1107-A  YOB: 1960  MRN: [id number]  Acct: [id number]   Admitting Diagnosis:   Disorientation [R41.0]  Acute exacerbation of chronic obstructive pulmonary disease (COPD) (Multi) [J44.1]  Hypothermia, initial encounter [T68.XXXA]  Acute pneumonia [J18.9]  Date:  5/7/2024  Hospital Day: 9  Attending: Mitesh Agarwal MD   Rounding CRISTINA/Cardiologist:  Kiara Frankel, APRN-CNP, Dr. Antonio Rodriges    Primary Cardiologist: Dr. Antonio Rodriges      Complaint:  Chief Complaint   Patient presents with    Altered Mental Status        SUBJECTIVE    Denies palpitations, dizziness, or lightheadedness  Awake and alert  Telemetry remains SB/SR with HR down to 36 BPM overnight        VITALS   Visit Vitals  /68 (BP Location: Right arm, Patient Position: Lying)   Pulse 53   Temp 35.4 °C (95.7 °F) (Temporal)   Resp 18        I/O:    Intake/Output Summary (Last 24 hours) at 5/16/2024 1344  Last data filed at 5/16/2024 0855  Gross per 24 hour   Intake 1860 ml   Output 3300 ml   Net -1440 ml        Allergies:  Allergies   Allergen Reactions    Ace Inhibitors Swelling, Angioedema, Shortness of breath and Other     'FACIAL TWISTING\" \"LOOKS LIKE I HAD A STROKE IN MY SLEEP\"    Hydroxychloroquine Other, Nausea And Vomiting, Nausea/vomiting and Unknown     RETINAL BLEEDING    RETINAL BLEEDING      RETINAL BLEEDING, Eye problems    Lisinopril Swelling    Penicillins Unknown     tolerates cephalosporins-unknown childhood allergy    Sulfa (Sulfonamide Antibiotics) Hives        PHYSICAL EXAM   Physical Exam  Constitutional:       Appearance: Normal appearance. She is obese.   HENT:      Head: Normocephalic and atraumatic.      Nose: Nose normal.      Mouth/Throat:      Mouth: Mucous membranes are moist.   Eyes:      Conjunctiva/sclera: Conjunctivae normal.   Cardiovascular:      Rate and Rhythm: Normal rate and regular rhythm.      Pulses: Normal pulses.      Heart " sounds: Normal heart sounds.   Pulmonary:      Effort: Pulmonary effort is normal.      Breath sounds: Normal breath sounds.   Musculoskeletal:         General: Deformity present. Normal range of motion.      Right lower leg: Edema present.      Left lower leg: Edema present.   Skin:     General: Skin is warm and dry.   Neurological:      General: No focal deficit present.      Mental Status: She is alert and oriented to person, place, and time.   Psychiatric:         Mood and Affect: Mood normal.         Behavior: Behavior normal.           LABS     Results for orders placed or performed during the hospital encounter of 05/07/24 (from the past 24 hour(s))   Basic Metabolic Panel   Result Value Ref Range    Glucose 121 (H) 74 - 99 mg/dL    Sodium 148 (H) 136 - 145 mmol/L    Potassium 3.2 (L) 3.5 - 5.3 mmol/L    Chloride 109 (H) 98 - 107 mmol/L    Bicarbonate 33 (H) 21 - 32 mmol/L    Anion Gap 9 (L) 10 - 20 mmol/L    Urea Nitrogen 21 6 - 23 mg/dL    Creatinine 1.44 (H) 0.50 - 1.05 mg/dL    eGFR 41 (L) >60 mL/min/1.73m*2    Calcium 7.8 (L) 8.6 - 10.3 mg/dL   POCT GLUCOSE   Result Value Ref Range    POCT Glucose 122 (H) 74 - 99 mg/dL   POCT GLUCOSE   Result Value Ref Range    POCT Glucose 205 (H) 74 - 99 mg/dL   ECG 12 Lead   Result Value Ref Range    Ventricular Rate 47 BPM    Atrial Rate 47 BPM    MN Interval 138 ms    QRS Duration 92 ms    QT Interval 476 ms    QTC Calculation(Bazett) 421 ms    P Axis 57 degrees    R Axis -15 degrees    T Axis -34 degrees    QRS Count 8 beats    Q Onset 215 ms    P Onset 146 ms    P Offset 204 ms    T Offset 453 ms    QTC Fredericia 438 ms   CBC and Auto Differential   Result Value Ref Range    WBC 11.9 (H) 4.4 - 11.3 x10*3/uL    nRBC 3.0 (H) 0.0 - 0.0 /100 WBCs    RBC 2.63 (L) 4.00 - 5.20 x10*6/uL    Hemoglobin 8.0 (L) 12.0 - 16.0 g/dL    Hematocrit 26.4 (L) 36.0 - 46.0 %     80 - 100 fL    MCH 30.4 26.0 - 34.0 pg    MCHC 30.3 (L) 32.0 - 36.0 g/dL    RDW 18.3 (H) 11.5 - 14.5  %    Platelets 79 (L) 150 - 450 x10*3/uL    Immature Granulocytes %, Automated 9.7 (H) 0.0 - 0.9 %    Immature Granulocytes Absolute, Automated 1.16 (H) 0.00 - 0.70 x10*3/uL   Basic Metabolic Panel   Result Value Ref Range    Glucose 259 (H) 74 - 99 mg/dL    Sodium 147 (H) 136 - 145 mmol/L    Potassium 3.7 3.5 - 5.3 mmol/L    Chloride 107 98 - 107 mmol/L    Bicarbonate 36 (H) 21 - 32 mmol/L    Anion Gap 8 (L) 10 - 20 mmol/L    Urea Nitrogen 19 6 - 23 mg/dL    Creatinine 1.38 (H) 0.50 - 1.05 mg/dL    eGFR 43 (L) >60 mL/min/1.73m*2    Calcium 7.8 (L) 8.6 - 10.3 mg/dL   Magnesium   Result Value Ref Range    Magnesium 1.70 1.60 - 2.40 mg/dL   Manual Differential   Result Value Ref Range    Neutrophils %, Manual 84.0 40.0 - 80.0 %    Bands %, Manual 8.0 0.0 - 5.0 %    Lymphocytes %, Manual 6.0 13.0 - 44.0 %    Monocytes %, Manual 2.0 2.0 - 10.0 %    Eosinophils %, Manual 0.0 0.0 - 6.0 %    Basophils %, Manual 0.0 0.0 - 2.0 %    Seg Neutrophils Absolute, Manual 10.00 (H) 1.20 - 7.00 x10*3/uL    Bands Absolute, Manual 0.95 (H) 0.00 - 0.70 x10*3/uL    Lymphocytes Absolute, Manual 0.71 (L) 1.20 - 4.80 x10*3/uL    Monocytes Absolute, Manual 0.24 0.10 - 1.00 x10*3/uL    Eosinophils Absolute, Manual 0.00 0.00 - 0.70 x10*3/uL    Basophils Absolute, Manual 0.00 0.00 - 0.10 x10*3/uL    Total Cells Counted 100     Neutrophils Absolute, Manual 10.95 (H) 1.20 - 7.70 x10*3/uL    RBC Morphology See Below     Polychromasia Mild     Ovalocytes Few    ECG 12 Lead   Result Value Ref Range    Ventricular Rate 44 BPM    Atrial Rate 44 BPM    GA Interval 134 ms    QRS Duration 104 ms    QT Interval 502 ms    QTC Calculation(Bazett) 429 ms    P Axis 66 degrees    R Axis -10 degrees    T Axis -18 degrees    QRS Count 8 beats    Q Onset 217 ms    P Onset 150 ms    P Offset 209 ms    T Offset 468 ms    QTC Fredericia 452 ms   POCT GLUCOSE   Result Value Ref Range    POCT Glucose 221 (H) 74 - 99 mg/dL   POCT GLUCOSE   Result Value Ref Range     POCT Glucose 286 (H) 74 - 99 mg/dL        Scheduled medications  [Held by provider] apixaban, 2.5 mg, oral, q12h  atorvastatin, 40 mg, oral, Daily  atovaquone, 1,500 mg, oral, Daily  fludrocortisone, 0.1 mg, oral, Daily  tiotropium, 2 puff, inhalation, Daily   And  fluticasone furoate-vilanteroL, 1 puff, inhalation, Daily  folic acid, 1 mg, oral, Daily  heparin (porcine), 5,000 Units, subcutaneous, q8h  hydrocortisone, 10 mg, oral, Nightly  hydrocortisone, 20 mg, oral, Daily  insulin glargine, 10 Units, subcutaneous, q AM  insulin lispro, 0-15 Units, subcutaneous, TID  levETIRAcetam, 500 mg, oral, BID  magnesium oxide, 400 mg, oral, Daily  melatonin, 5 mg, oral, Nightly  multivitamin with minerals, 1 tablet, oral, Daily  mycophenolate, 1,000 mg, oral, BID  OLANZapine zydis, 5 mg, oral, Nightly  pantoprazole, 40 mg, oral, Daily before breakfast  thiamine, 100 mg, oral, Daily      Continuous medications     PRN medications  PRN medications: acetaminophen **OR** acetaminophen, alteplase, dextrose, dextrose, glucagon, hydrALAZINE, ipratropium-albuteroL, ipratropium-albuteroL, ondansetron, oxygen     ECG 12 Lead    Result Date: 5/16/2024  Marked sinus bradycardia Voltage criteria for left ventricular hypertrophy Abnormal ECG When compared with ECG of 15-MAY-2024 21:39, (unconfirmed) ST no longer depressed in Anterior leads T wave inversion no longer evident in Anterolateral leads    ECG 12 Lead    Result Date: 5/16/2024  Sinus bradycardia Voltage criteria for left ventricular hypertrophy Nonspecific T wave abnormality Abnormal ECG When compared with ECG of 15-MAY-2024 21:38, (unconfirmed) ST more depressed in Inferior leads    ECG 12 Lead    Result Date: 5/15/2024  Sinus bradycardia with Premature atrial complexes Voltage criteria for left ventricular hypertrophy Abnormal ECG When compared with ECG of 14-MAY-2024 07:04, (unconfirmed) Premature atrial complexes are now Present Confirmed by Michael Arenas ([id number]) on  5/15/2024 10:46:15 AM    ECG 12 Lead    Result Date: 5/15/2024  Sinus bradycardia Possible Left atrial enlargement Left ventricular hypertrophy Nonspecific ST abnormality Abnormal ECG When compared with ECG of 13-MAY-2024 06:33, (unconfirmed) No significant change was found Confirmed by Michael Arenas ([id number]) on 5/15/2024 10:37:55 AM    ECG 12 Lead    Result Date: 5/15/2024  Sinus bradycardia Voltage criteria for left ventricular hypertrophy Nonspecific ST changes Abnormal ECG When compared with ECG of 12-MAY-2024 06:38, Sinus rhythm has replaced Atrial fibrillation Confirmed by Michael Arenas ([id number]) on 5/15/2024 10:26:42 AM    Bedside Midline Imaging    Result Date: 5/13/2024  These images are not reportable by radiology and will not be interpreted by  Radiologists.    ECG 12 Lead    Result Date: 5/12/2024  Atrial fibrillation Voltage criteria for left ventricular hypertrophy ST & T wave abnormality, consider inferior ischemia Abnormal ECG When compared with ECG of 12-MAY-2024 01:59, (unconfirmed) No significant change was found Confirmed by Antonio Rodriges ([id number]) on 5/12/2024 10:06:57 AM    ECG 12 Lead    Result Date: 5/12/2024  Atrial fibrillation Moderate voltage criteria for LVH, may be normal variant Nonspecific ST and T wave abnormality Abnormal ECG When compared with ECG of 11-MAY-2024 07:08, Atrial fibrillation has replaced Sinus rhythm Non-specific change in ST segment in Lateral leads Confirmed by Antonio Rodriges ([id number]) on 5/12/2024 10:06:48 AM    MR brain w and wo IV contrast    Result Date: 5/11/2024  Interpreted By:  Emelia George, STUDY: MR BRAIN W AND WO IV CONTRAST; 5/11/2024 5:51 pm   INDICATION: Signs/Symptoms:seizure, change in MS.   COMPARISON: CT head from 05/07/2024   ACCESSION NUMBER(S): [id number]   ORDERING CLINICIAN: VINCENT LOPEZ   TECHNIQUE: Axial T2, FLAIR, DWI, gradient echo T2 and T1 weighted images of brain were acquired. Post contrast T1 weighted images were acquired after  administration of  gadolinium based intravenous contrast.   FINDINGS: There is no diffusion restriction abnormality to suggest acute infarct.   There is an area of encephalomalacia within the left occipital lobe with patchy hemosiderin deposition.   There are patchy areas of T2 and FLAIR hyperintense signal within bilateral periventricular and subcortical white matter which likely reflect sequela of chronic small vessel ischemic change. Another small area of cortical encephalomalacia is noted within the parasagittal posterior right frontal lobe as well as within the left parietal lobe.   Minimal nonspecific white matter changes are noted within the ryan. 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   Postcontrast imaging demonstrates no focus of abnormal parenchymal or leptomeningeal enhancement.   Visualized paranasal sinuses are clear. Mild opacification of bilateral mastoid air cells is noted.       No evidence of acute infarct, intracranial mass effect or midline shift. Multiple chronic findings as detailed.   Signed by: Emelia George 5/11/2024 6:44 PM Dictation workstation:   [id number]    ECG 12 Lead    Result Date: 5/11/2024  Normal sinus rhythm Moderate voltage criteria for LVH, may be normal variant T wave abnormality, consider inferior ischemia Abnormal ECG When compared with ECG of 10-MAY-2024 10:52, (unconfirmed) ST no longer depressed in Inferior leads Nonspecific T wave abnormality now evident in Anterolateral leads Confirmed by Antonio Rodriges ([id number]) on 5/11/2024 10:13:04 AM    ECG 12 Lead    Result Date: 5/11/2024  Sinus bradycardia Moderate voltage criteria for LVH, may be normal variant Borderline ECG When compared with ECG of 09-MAY-2024 09:01, Nonspecific T wave abnormality no longer evident in Anterior leads Confirmed by Antonio Rodriges ([id number]) on 5/11/2024 10:12:50 AM    Transthoracic Echo (TTE) Limited    Result Date: 5/10/2024          [address]  [place] [address]  Tel [phone] Fax [phone] TRANSTHORACIC ECHOCARDIOGRAM REPORT  Patient Name:      LISBET GUTIERRES      Reading Physician:    [id number] Bacilio Sommers DO Study Date:        5/10/2024             Ordering Provider:    [id number] SAMANTHA HEENA                                                                BRIDGETTE MRN/PID:           [id number]              Fellow: Accession#:        [id number]          Nurse: Date of Birth/Age: 1960 / 63 years Sonographer:          Shahana Osullivan                                                                RDDARRION Gender:            F                     Additional Staff: Height:            165.10 cm             Admit Date:           5/7/2024 Weight:            102.06 kg             Admission Status:     Inpatient -                                                                Routine BSA / BMI:         2.08 m2 / 37.44 kg/m2 Department Location:  [facility] Blood Pressure: 167 /53 mmHg Study Type:    TRANSTHORACIC ECHO (TTE) LIMITED Diagnosis/ICD: Bradycardia, unspecified-R00.1 Indication:    Abnormal EKG CPT Codes:     Echo Limited-66780; Color Doppler-54176; Doppler Limited-27216 Patient History: Pertinent History: HTN, Hyperlipidemia, A-Fib, Cardiomyopathy and Lupus. Study Detail: The following Echo studies were performed: 2D, M-Mode, Doppler and               color flow. Technically challenging study due to patient lying in               supine position and the patient's lack of cooperation.  PHYSICIAN INTERPRETATION: Left Ventricle: Left ventricular systolic function is mildly to moderately decreased, with an estimated ejection fraction of 40-45%. There is global hypokinesis of the left ventricle with minor regional variations. The left ventricular cavity size is normal. There is moderate concentric left ventricular  hypertrophy. Left ventricular diastolic filling was not assessed. Left Atrium: The left atrium is upper limits of normal in size. Right Ventricle: The right ventricle is normal in size. There is normal right ventricular global systolic function. Right Atrium: The right atrium is normal in size. Aortic Valve: The aortic valve appears structurally normal. The aortic valve appears tricuspid. There is no evidence of aortic valve stenosis. There is no evidence of aortic valve regurgitation. Mitral Valve: The mitral valve is normal in structure. There is no evidence of mitral valve stenosis. There is normal mitral valve leaflet mobility. There is mild to moderate mitral valve regurgitation. Tricuspid Valve: The tricuspid valve is structurally normal. There is normal tricuspid valve leaflet mobility. There is mild tricuspid regurgitation. Pulmonic Valve: The pulmonic valve is structurally normal. There is no indication of pulmonic valve regurgitation. Pericardium: There is no pericardial effusion noted. Aorta: The aortic root is normal. Pulmonary Artery: The pulmonary artery is not well visualized. Pulmonary hypertension is present. The tricuspid regurgitant velocity is 3.79 m/s, and with an estimated right atrial pressure of 3 mmHg, the estimated pulmonary artery pressure is severely elevated with the RVSP at 60.5 mmHg. Systemic Veins: The inferior vena cava was not well visualized. In comparison to the previous echocardiogram(s): Prior examinations are available and were reviewed for comparison purposes. The left ventricular function is unchanged. The left ventricular hypertrophy is worse.  CONCLUSIONS:  1. Left ventricular systolic function is mildly to moderately decreased with a 40-45% estimated ejection fraction.  2. There is moderate concentric left ventricular hypertrophy.  3. There is no evidence of mitral valve stenosis.  4. Mild to moderate mitral valve regurgitation.  5. Mild tricuspid regurgitation is  visualized.  6. Aortic valve stenosis is not present.  7. Pulmonary hypertension is present.  8. Severely elevated pulmonary artery pressure.  9. The pulmonary artery is not well visualized. 10. There is global hypokinesis of the left ventricle with minor regional variations. RECOMMENDATIONS: Technically suboptimal and limited study, therefore accuracy of above interpretation could be substantially diminished. Clinical correlation is advised. Consider additional imaging modalities if clinically indicated.  QUANTITATIVE DATA SUMMARY: 2D MEASUREMENTS:                           Normal Ranges: LAs:           3.90 cm    (2.7-4.0cm) IVSd:          1.32 cm    (0.6-1.1cm) LVPWd:         1.37 cm    (0.6-1.1cm) LVIDd:         4.81 cm    (3.9-5.9cm) LVIDs:         3.87 cm LV Mass Index: 124.5 g/m2 LV % FS        19.5 % LV SYSTOLIC FUNCTION BY 2D PLANIMETRY (MOD):                     Normal Ranges: EF-A4C View: 39.2 % (>=55%) EF-A2C View: 42.1 % EF-Biplane:  40.8 % TRICUSPID VALVE/RVSP:                             Normal Ranges: Peak TR Velocity: 3.79 m/s RV Syst Pressure: 60.5 mmHg (< 30mmHg)  [id number] Bacilio Sommers DO Electronically signed on 5/10/2024 at 10:57:55 PM  ** Final **     XR chest 1 view    Result Date: 5/10/2024  Interpreted By:  Ceci Westbrook, STUDY: XR CHEST 1 VIEW;  5/10/2024 7:00 pm   INDICATION: Signs/Symptoms:Line placement   COMPARISON: Chest x-ray 05/07/2024.   ACCESSION NUMBER(S): [id number]   ORDERING CLINICIAN: GENIE BRIGHT   TECHNIQUE: Portable frontal view of the chest was obtained .   FINDINGS: Monitoring wires and radiopaque densities are overlying the patient.   There is a left IJ central line with the tip overlying the region of the right atrium. There is radiopaque tubing at the soft tissues at the right side of the neck.   The heart is enlarged. Atherosclerotic calcifications are noted at the thoracic aorta. There is vascular congestion. There are bilateral airspace opacities. There are  small bilateral pleural effusions. No pneumothorax.   Degenerative changes at the bilateral glenohumeral joints.       1. Left IJ central line with the tip overlying the region of the right atrium. No radiographic evidence for pneumothorax. 2. Radiopaque tubing at the soft tissues of the right side of the neck, may represent a peripherally inserted catheter. Clinical correlation recommended. 3. Cardiomegaly. Vascular congestion. Bilateral airspace opacities, may be secondary to pulmonary edema and/or pneumonia.       MACRO: None.   Signed by: Ceci Westbrook 5/10/2024 8:06 PM Dictation workstation:   [id number]    FL guided lumbar puncture    Result Date: 5/10/2024  Interpreted By:  Hay Tucker, STUDY: FL GUIDED LUMBAR PUNCTURE;  5/10/2024 1:59 pm   INDICATION: Signs/Symptoms:Encephalopathy, needs LP.   COMPARISON: CT abdomen/pelvis of 04/25/2024.   ACCESSION NUMBER(S): [id number]   ORDERING CLINICIAN: ONELIA GREGORY   FINDINGS: PROCEDURE: After discussion of risks, benefits, and alternatives, oral and written informed consent was obtained.   The patient was placed in prone position on exam table. There were procedure limitations related to limited patient cooperation and agitation. There is also lumbar scoliosis and multilevel degenerative changes of the spine.   The L3-4 interspace was then marked under fluoroscopy. The patient was then prepped and draped in sterile fashion. Local anesthesia was achieved with 2% Lidocaine solution. A 22 gauge spinal needle was then introduced at the L3-L4 level under direct fluoroscopic guidance. There was free return of slightly blood-tinged CSF. Initially approximately 0.5 mL of fluid was able to be collected although the flow of CSF then stopped. Needle was slightly repositioned although additional CSF flow was not able to be achieved. Patient was becoming more agitated and it was decided that the procedure be stopped at this point. The stylet was then replaced and  the spinal needle was removed.  Hemostasis was achieved with direct pressure and the area was dressed with a Band-Aid.   Patient was sent back to inpatient floor for routine recovery.   Total fluoroscopy time was 32 seconds.   Total saved images:  1.   The small volume of CSF that was collected was placed in a single sterile vial and submitted for laboratory analysis.       Fluoroscopic guided lumbar puncture attempt. Only a very small volume of slightly blood-tinged CSF was able to be obtained and then flow of fluid stopped. The small volume of fluid obtained was submitted for laboratory analysis.   MACRO: None.   Signed by: Hay Tucker 5/10/2024 2:10 PM Dictation workstation:   [id number]    EEG    IMPRESSION Impression This vEEG is indicative of moderate diffuse encephalopathy. No epileptiform discharges or lateralizing signs are recorded. Multiple non epileptic head clonic events occurred throughout the recording. A full report will be scanned into the patient's chart at a later time. This report has been interpreted and electronically signed by    ECG 12 Lead    Result Date: 5/9/2024  Marked sinus bradycardia Voltage criteria for left ventricular hypertrophy Abnormal ECG When compared with ECG of 07-MAY-2024 13:54, (unconfirmed) Vent. rate has decreased BY  26 BPM Nonspecific T wave abnormality no longer evident in Lateral leads QT has shortened Confirmed by Antonio Rodriges ([id number]) on 5/9/2024 1:14:57 PM    Electrocardiogram, 12-lead PRN ACS symptoms    Result Date: 5/9/2024  Normal sinus rhythm Voltage criteria for left ventricular hypertrophy Nonspecific ST and T wave abnormality Abnormal ECG When compared with ECG of 26-APR-2024 12:24, QT has lengthened See ED provider note for full interpretation and clinical correlation Confirmed by Antonio Rodriges ([id number]) on 5/9/2024 1:09:51 PM    CT head wo IV contrast    Result Date: 5/7/2024  Interpreted By:  Higinio Sanchez, STUDY: CT HEAD WO IV CONTRAST;  5/7/2024  5:01 pm   INDICATION: Signs/Symptoms:AMS.   COMPARISON: 04/26/2024   ACCESSION NUMBER(S): [id number]   ORDERING CLINICIAN: GRAHAM HENDERSON   TECHNIQUE: Noncontrast axial CT scan of head was performed. Angled reformats in brain and bone windows were generated. The images were reviewed in bone, brain, blood and soft tissue windows.   FINDINGS: The ventricles, cisterns and sulci are prominent, consistent with mild diffuse volume loss. There are areas of nonspecific white matter hypodensity, which are probably age-related or microvascular in nature.   Gray-white differentiation is intact and there is no evidence of acute cortical infarct. Hypodensity in the left occipital lobe is unchanged and similar the prior exam. No mass, mass effect or midline shift is seen. There is no evidence of hemorrhage.   The visualized paranasal sinuses are clear.         No evidence of acute cortical infarct or intracranial hemorrhage.   Stable chronic changes.   MACRO: None   Signed by: Higinio Sanchez 5/7/2024 5:43 PM Dictation workstation:   [id number]    XR chest 1 view    Result Date: 5/7/2024  Interpreted By:  Nic Moseley, STUDY: XR CHEST 1 VIEW  5/7/2024 1:32 pm   INDICATION: Signs/Symptoms:weak   COMPARISON: 04/24/2024   ACCESSION NUMBER(S): [id number]   ORDERING CLINICIAN: GRAHAM HENDERSON   TECHNIQUE: A single AP portable radiograph of the chest was obtained.   FINDINGS: Mild diffuse interstitial infiltrates are seen bilaterally, and may represent edema and/or pneumonia. No pneumothorax is identified. The cardiac silhouette is mildly prominent, similar to prior studies.       Diffuse interstitial infiltrates bilaterally, as above. Clinical correlation and continued follow-up until clearing is recommended.   MACRO: None.   Signed by: Nic Moseley 5/7/2024 2:07 PM Dictation workstation:   [id number]    ECG 12 lead    Result Date: 4/30/2024  Normal sinus rhythm Possible Left atrial enlargement Left ventricular hypertrophy with  repolarization abnormality Abnormal ECG When compared with ECG of 20-MAR-2024 00:59, Vent. rate has increased BY  37 BPM Confirmed by Evaristo Moran ([id number]) on 4/30/2024 1:13:25 PM    US right upper quadrant    Result Date: 4/27/2024  Interpreted By:  Graciela Blevins, STUDY: US RIGHT UPPER QUADRANT;  4/27/2024 5:24 pm   INDICATION: Signs/Symptoms:looking for islet tumor.   COMPARISON: Correlation with noncontrast CT abdomen pelvis 04/25/2024   ACCESSION NUMBER(S): [id number]   ORDERING CLINICIAN: TU ZAMARRIPA   TECHNIQUE: Grayscale and color Doppler sonographic imaging of the right upper quadrant.   FINDINGS: LIVER: Normal size. Normal echogenicity, and echotexture. No focal abnormalities.   BILE DUCTS: No intrahepatic or extrahepatic bile duct dilatation. Extrahepatic bile duct = 5 mm.   GALLBLADDER: Status post cholecystectomy.   PANCREAS: Normal head and body. Limited evaluation of the pancreatic tail due to bowel gas. No pancreatic mass is seen. Pancreatic duct is within normal limits in size.   RIGHT KIDNEY: Normal size, no hydronephrosis.   PERITONEUM: No upper abdominal ascites.       Unremarkable right upper quadrant ultrasound.   No visualized pancreatic mass. Please note that ultrasound is limited in sensitivity for pancreatic masses and further evaluation with nonemergent pancreas protocol MRI may be considered if clinically indicated.   MACRO: None   Signed by: Graciela Blevins 4/27/2024 6:47 PM Dictation workstation:   [id number]    CT head wo IV contrast    Result Date: 4/26/2024  Interpreted By:  Graciela Blevins, STUDY: CT HEAD WO IV CONTRAST;  4/26/2024 5:14 pm   INDICATION: Signs/Symptoms:Altered mental status.   COMPARISON: 03/20/2024   ACCESSION NUMBER(S): [id number]   ORDERING CLINICIAN: TU ZAMARRIPA   TECHNIQUE: Axial noncontrast CT images of the head.   FINDINGS: BRAIN PARENCHYMA: Stable encephalomalacia in the left occipital lobe. Gray-white matter interfaces are otherwisepreserved.  Calcifications are in the basal ganglia.   deep and periventricular white matter hypodensities are nonspecific, but favored to represent chronic small vessel ischemic changes.  No mass effect or midline shift.   HEMORRHAGE: No acute intracranial hemorrhage. VENTRICLES and EXTRA-AXIAL SPACES: The ventricles and sulci are within normal limits in size for brain volume. No abnormal extraaxial fluid collection. EXTRACRANIAL SOFT TISSUES: Within normal limits. PARANASAL SINUSES/MASTOIDS:  The visualized paranasal sinuses and mastoid air cells are aerated. CALVARIUM: No depressed skull fracture. No destructive osseous lesion.   OTHER FINDINGS: None.       No acute intracranial abnormality.   Stable chronic findings as above.   MACRO: None   Signed by: Graciela Blevins 4/26/2024 6:56 PM Dictation workstation:   [id number]    XR elbow left 3+ views    Result Date: 4/26/2024  Interpreted By:  Jose A Anderson, STUDY: XR ELBOW LEFT 3+ VIEWS; ;  4/26/2024 3:02 pm   INDICATION: Signs/Symptoms:pain.   COMPARISON: None.   ACCESSION NUMBER(S): [id number]   ORDERING CLINICIAN: VINCENT VARELA   FINDINGS: Small anterior and posterior effusions. No definite fracture visualized. No significant soft tissue swelling. No radiopaque foreign body. Mild degenerative changes.       Small effusions without visualized fracture. Mild degenerative changes of the elbow.     MACRO: None   Signed by: Jose A Anderson 4/26/2024 3:41 PM Dictation workstation:   [id number]    CT elbow left wo IV contrast    Result Date: 4/25/2024  Interpreted By:  Graciela Blevins, STUDY: CT ELBOW LEFT WO IV CONTRAST; ;  4/25/2024 8:31 pm   INDICATION: Signs/Symptoms:Self mutilation of antecubital fossa, joint tender to touch, looking for infection.   COMPARISON: None.   ACCESSION NUMBER(S): [id number]   ORDERING CLINICIAN: TU ZAMARRIPA   TECHNIQUE: Noncontrast CT images of the left elbow with axial, sagittal and coronal reconstructed images.   FINDINGS: No  acute fracture or malalignment. 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 No erosions or destructive changes. There is a small elbow joint effusion. Otherwise, soft tissues are unremarkable. No soft tissue gas or radiopaque foreign body.       Nonspecific small elbow joint effusion. If there is clinical concern for septic arthritis, joint aspiration is recommended.   Otherwise, soft tissues are unremarkable.   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     MACRO: None   Signed by: Graciela Blevins 4/25/2024 9:54 PM Dictation workstation:   [id number]    CT abdomen pelvis wo IV contrast    Result Date: 4/25/2024  Interpreted By:  Graciela Blevins, STUDY: CT ABDOMEN PELVIS WO IV CONTRAST;  4/25/2024 8:31 pm   INDICATION: Signs/Symptoms:3 pt drop in hgb, AMS, worsening encephalopathy, hx of secondary adrenal insuficiency and hypoglycemia.   COMPARISON: 03/20/2024   ACCESSION NUMBER(S): [id number]   ORDERING CLINICIAN: TU ZAMARRIPA   TECHNIQUE: Axial noncontrast CT images of the abdomen and pelvis with coronal and sagittal reconstructed images.   FINDINGS: LOWER CHEST: New bilateral lower lobe airspace opacities. Cardiomegaly and coronary artery calcifications noted. BONES: No acute osseous abnormality. Diffuse degenerative disc changes and lumbar facet arthropathy. Right total hip arthroplasty. Severe left hip osteoarthrosis. ABDOMINAL WALL: Within normal limits.   ABDOMEN: Lack of intravenous contrast limits evaluation of vessels and solid organs. LIVER: Within normal limits. BILE DUCTS: No biliary dilatation. GALLBLADDER: Cholecystectomy. PANCREAS: No peripancreatic inflammatory stranding or duct dilatation. SPLEEN: Within normal limits. ADRENALS: Within normal limits.   KIDNEYS, URETERS, URINARY BLADDER: No hydronephrosis. 2 mm nonobstructing right renal calculus. Limited evaluation of the distal ureters due to streak artifact. No hydroureter. Evaluation of the urinary bladder due to streak artifact.   VESSELS: No aortic  aneurysm. RETROPERITONEUM: No pathologically enlarged lymph nodes.   PELVIS:   REPRODUCTIVE ORGANS: No abnormality, given limitations of the noncontrast CT.   BOWEL: The stomach is mildly distended. No dilated small bowel. Colonic diverticulosis without acute diverticulitis. Normal appendix. PERITONEUM: No ascites or free air, no fluid collection.       No acute abdominal or pelvic process.   Nonspecific bilateral lower lobe airspace opacities. Please correlate clinically to exclude pneumonia.   MACRO: None   Signed by: Graciela Blevins 4/25/2024 9:51 PM Dictation workstation:   [id number]    Bedside Peripheral IV Imaging    Result Date: 4/25/2024  These images are not reportable by radiology and will not be interpreted by  Radiologists.    Lower extremity venous duplex bilateral    Result Date: 4/25/2024            [facility] [place] [address] [address] [place] [address]     Tel [phone] Fax [phone]  Vascular Lab Report  VASC US LOWER EXTREMITY VENOUS DUPLEX BILATERAL Patient Name:      LISBET GUTIERRES      Reading Physician:  [id number] Enzo Ashley MD, RPVI Study Date:        4/25/2024             Ordering Provider:  [id number] HILDA JUAREZ MRN/PID:           [id number]              Fellow: Accession#:        [id number]          Technologist:       Beth Hannah RVT, RDMS Date of Birth/Age: 1960 / 63 years Technologist 2: Gender:            F                     Encounter#:         [id number] Admission Status:  Inpatient             Location Performed: [facility]  Diagnosis/ICD: Other specified soft tissue disorders-M79.89 Indication:    Limb swelling CPT Codes:     60091 Peripheral venous duplex scan for DVT complete  Pertinent History:  COPD, Anticoagulation, AAA and LE Edema. Afib.  CONCLUSIONS: Right Lower Venous: No evidence of acute deep vein thrombus visualized in the right lower extremity. Left Lower Venous: No evidence of acute deep vein thrombus visualized in the left lower extremity. Additional Findings: Limited images due to patient's inability to tolerate compressions.  Comparison: Compared with study from 1/15/2024, no significant change.No evidence of DVT.  Imaging & Doppler Findings:  Right                 Compressible Thrombus        Flow Distal External Iliac                None   Spontaneous/Phasic CFV                       Yes        None   Spontaneous/Phasic PFV                       Yes        None FV Proximal               Yes        None   Spontaneous/Phasic FV Mid                    Yes        None FV Distal                 Yes        None Popliteal                 Yes        None   Spontaneous/Phasic Peroneal                  Yes        None PTV                       Yes        None  Left                  Compress Thrombus        Flow Distal External Iliac            None   Spontaneous/Phasic CFV                     Yes      None   Spontaneous/Phasic PFV                     Yes      None FV Proximal             Yes      None   Spontaneous/Phasic FV Mid                  Yes      None FV Distal               Yes      None Popliteal               Yes      None   Spontaneous/Phasic Peroneal                Yes      None PTV                     Yes      None  [id number] Enzo Ashley MD, RPVI Electronically signed by [id number] Enzo Ashley MD, RPVI on 4/25/2024 at 1:07:18 PM  ** Final **     XR chest 1 view    Result Date: 4/24/2024  Interpreted By:  Mitesh Khan, STUDY: XR CHEST 1 VIEW;  4/24/2024 5:41 pm   INDICATION: Signs/Symptoms:cough.   COMPARISON: 03/19/2024   ACCESSION NUMBER(S): [id number]   ORDERING CLINICIAN: ENEIDA GRIER   FINDINGS:         CARDIOMEDIASTINAL SILHOUETTE: Cardiomediastinal silhouette is enlarged.    LUNGS: There is interval appearance of a right upper lobe patchy airspace disease concerning for pneumonia. The left lung is clear. There is no effusion   ABDOMEN: No remarkable upper abdominal findings.   BONES: No acute osseous changes.       1.  New right upper lobe airspace disease compatible with pneumonia in the proper clinical setting. Radiographic follow-up to resolution in 3-4 weeks is advised.       MACRO: None   Signed by: Mitesh Khan 4/24/2024 5:50 PM Dictation workstation:   [id number]       Encounter Date: 05/07/24   ECG 12 Lead   Result Value    Ventricular Rate 44    Atrial Rate 44    OR Interval 134    QRS Duration 104    QT Interval 502    QTC Calculation(Bazett) 429    P Axis 66    R Axis -10    T Axis -18    QRS Count 8    Q Onset 217    P Onset 150    P Offset 209    T Offset 468    QTC Fredericia 452    Narrative    Marked sinus bradycardia  Voltage criteria for left ventricular hypertrophy  Abnormal ECG  When compared with ECG of 15-MAY-2024 21:39, (unconfirmed)  ST no longer depressed in Anterior leads  T wave inversion no longer evident in Anterolateral leads        Tele Monitoring:Sinus bradycardia sinus rhythm with heart rate 38 to 80 bpm/EKG shows sinus bradycardia with heart rate 44 bpm and QTc 429 ms    Assessment     Patient Active Problem List   Diagnosis    COVID-19    Lupus (Multi)    Ambulatory dysfunction    Myelodysplastic syndrome, unspecified (Multi)    Lupus vasculitis (Multi)    Hyperlipidemia    Pneumonia of both lower lobes due to infectious organism    Hallucinations    Leg swelling    Hyperglycemia    JED (acute kidney injury) (CMS-HCC)    Hypernatremia    Proliferative diabetic retinopathy of right eye without macular edema determined by examination associated with type 2 diabetes mellitus (Multi)    Urinary incontinence    Paraparesis (Multi)    Abdominal cramping    Abnormal echocardiogram    Abnormal gait    Abnormal thyroid blood test    Advanced COPD (Multi)     Afferent pupillary defect of right eye    Anemia    Pancytopenia (Multi)    Altitudinal scotoma of right eye    Arcuate scotoma of left eye    Arthropathy    Asymptomatic PVCs    PAF (paroxysmal atrial fibrillation) (Multi)    Balance problem    Bilateral high frequency sensorineural hearing loss    Bradycardia    Branch retinal artery occlusion    Cardiomyopathy (Multi)    Chronic diarrhea    Chronic fatigue    Chronic kidney disease (CKD), stage III (moderate) (Multi)    CKD stage G3a/A2, GFR 45-59 and albumin creatinine ratio  mg/g (Multi)    Combined forms of age-related cataract of left eye    Combined forms of age-related cataract of right eye    Contracture of hand    Snoring    CVA (cerebral vascular accident) (Multi)    Daytime somnolence    Degeneration of lumbar/lumbosacral disc without myelopathy    Depression, major    Dizziness    Dupuytren's contracture    Eczema    Elevated alkaline phosphatase level    Encephalopathy acute    Facial twitching    Fibromyalgia    Fungal nail infection    GERD (gastroesophageal reflux disease)    Gouty arthropathy    H/O iritis    H/O orthostatic hypotension    Hereditary elliptocytosis (CMS-HCC)    High level of cardiac marker    Hip hematoma, right    Hypothermia    Insomnia    Leg edema, left    Loss of consciousness (Multi)    Low blood sugar reading    Lung nodule, multiple    Lupus nephritis (Multi)    Metabolic encephalopathy    Muscle cramps    Nodule of skin of left lower leg    Obstructive lung disease (Multi)    Osteoarthritis of left hand    Osteoarthritis of right hand    Osteopenia    Osteoporosis    Palpitations    Peripheral visual field defect    Persistent proteinuria    Positive colorectal cancer screening using Cologuard test    Benign essential HTN    Psychosis (Multi)    Pulmonary hypertension (Multi)    Refractive error    Hypertensive retinopathy    Retinal neovascularization    Secondary pulmonary arterial hypertension (Multi)     Shortness of breath on exertion    Spinal stenosis of lumbar region with neurogenic claudication    Subjective tinnitus of both ears    Swelling of right foot    Syncope and collapse    Thrombophlebitis of superficial veins of left lower extremity    Tinnitus of left ear    Vitamin D deficiency    DM type 2 with diabetic peripheral neuropathy (Multi)    Systemic lupus erythematosus (Multi)    Diabetic nephropathy (Multi)    Functional diarrhea    UTI (urinary tract infection)    Moderate protein-calorie malnutrition (Multi)    Shock, unspecified (Multi)    Hyperkalemia    Adrenal insufficiency (Multi)    Seizure-like activity (Multi)    Meningitis (HHS-HCC)    Encephalopathy    Seizure (Multi)    Uncontrolled blood glucose    Cervical spondylosis with myelopathy    COPD (chronic obstructive pulmonary disease) (Multi)    Encounter for diabetes education    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  Marked sinus bradycardia with tachybradycardia  2.  Hypertension  3.  Lupus  4.  Chronic kidney disease  5.  Anemia  6.  History of coronary artery disease with coronary artery bypass graft surgery in 2013  7.  History of atrial fibrillation on Eliquis therapy  8.  History of adrenal insufficiency  9.  Cardiomyopathy with a left ventricular ejection fraction of 40% per echocardiogram in March/2024 - repeat echo 5/10/24 shows LVEF 40-45% with moderate LVH  10. Pulmonary artery HTN with severly elevated PAP (60.5 mmhg)    11.  Thrombocytopenia      Plan:   Due to patient's marked sinus bradycardia and episodes of atrial fibrillation with RVR patient would benefit from permanent pacemaker implant when clinically stable  Eliquis currently on hold and will plan for dual-chamber permanent pacemaker implant prior to discharge  Continue to monitor on telemetry  If patient has recurrent atrial fibrillation will need to bridge with heparin, no heparin needed at this time as patient is in sinus rhythm  Patient may need blood transfusion/ platelet transfusion prior to pacemaker placement - further recommendations per Dr Rodriges  Appreciate recommendations from Endocrinology regarding history of steroid use and upcoming surgical procedure for PPM implant          Electronically signed by ASIM Zaman on 5/16/2024 at 1:44 PM    Patient is feeling much better  Telemetry shows sinus rhythm  Long conversation with patient about plan to follow for management of significant bradycardia in the setting of coronary artery disease unable to use beta-blocker therapy and also history of atrial fibrillation during this admission with episodes of rapid ventricular response.  Patient will benefit of a dual-chamber pacemaker implantation.  Procedure, risk, benefits and possible complications were explained to patient.  All questions were answered.  Patient agrees with plan.  Informed consent was signed.    Primary team will give a dose of steroids prior to procedure  We will give also platelets and also blood transfusions to try to keep hemoglobin above 8.5.  Continue telemetry    Antonio Rodriges MD

## 2024-05-17 ENCOUNTER — APPOINTMENT (OUTPATIENT)
Dept: CARDIOLOGY | Facility: HOSPITAL | Age: 64
DRG: 981 | End: 2024-05-17
Payer: MEDICARE

## 2024-05-17 ENCOUNTER — APPOINTMENT (OUTPATIENT)
Dept: RADIOLOGY | Facility: HOSPITAL | Age: 64
DRG: 981 | End: 2024-05-17
Payer: MEDICARE

## 2024-05-17 LAB
ANION GAP SERPL CALC-SCNC: 8 MMOL/L (ref 10–20)
BASOPHILS # BLD MANUAL: 0 X10*3/UL (ref 0–0.1)
BASOPHILS NFR BLD MANUAL: 0 %
BUN SERPL-MCNC: 20 MG/DL (ref 6–23)
BURR CELLS BLD QL SMEAR: ABNORMAL
CALCIUM SERPL-MCNC: 7.9 MG/DL (ref 8.6–10.3)
CHLORIDE SERPL-SCNC: 107 MMOL/L (ref 98–107)
CO2 SERPL-SCNC: 36 MMOL/L (ref 21–32)
CREAT SERPL-MCNC: 1.32 MG/DL (ref 0.5–1.05)
EGFRCR SERPLBLD CKD-EPI 2021: 46 ML/MIN/1.73M*2
EOSINOPHIL # BLD MANUAL: 0 X10*3/UL (ref 0–0.7)
EOSINOPHIL NFR BLD MANUAL: 0 %
ERYTHROCYTE [DISTWIDTH] IN BLOOD BY AUTOMATED COUNT: 19.9 % (ref 11.5–14.5)
GLUCOSE BLD MANUAL STRIP-MCNC: 181 MG/DL (ref 74–99)
GLUCOSE BLD MANUAL STRIP-MCNC: 208 MG/DL (ref 74–99)
GLUCOSE BLD MANUAL STRIP-MCNC: 217 MG/DL (ref 74–99)
GLUCOSE BLD MANUAL STRIP-MCNC: 237 MG/DL (ref 74–99)
GLUCOSE SERPL-MCNC: 213 MG/DL (ref 74–99)
HCT VFR BLD AUTO: 35.7 % (ref 36–46)
HGB BLD-MCNC: 11 G/DL (ref 12–16)
HOLD SPECIMEN: NORMAL
IMM GRANULOCYTES # BLD AUTO: 0.76 X10*3/UL (ref 0–0.7)
IMM GRANULOCYTES NFR BLD AUTO: 6.6 % (ref 0–0.9)
LYMPHOCYTES # BLD MANUAL: 0.7 X10*3/UL (ref 1.2–4.8)
LYMPHOCYTES NFR BLD MANUAL: 6 %
MAGNESIUM SERPL-MCNC: 1.73 MG/DL (ref 1.6–2.4)
MCH RBC QN AUTO: 29.7 PG (ref 26–34)
MCHC RBC AUTO-ENTMCNC: 30.8 G/DL (ref 32–36)
MCV RBC AUTO: 97 FL (ref 80–100)
MONOCYTES # BLD MANUAL: 0.35 X10*3/UL (ref 0.1–1)
MONOCYTES NFR BLD MANUAL: 3 %
NEUTROPHILS # BLD MANUAL: 10.56 X10*3/UL (ref 1.2–7.7)
NEUTS BAND # BLD MANUAL: 0.58 X10*3/UL (ref 0–0.7)
NEUTS BAND NFR BLD MANUAL: 5 %
NEUTS SEG # BLD MANUAL: 9.98 X10*3/UL (ref 1.2–7)
NEUTS SEG NFR BLD MANUAL: 86 %
NRBC BLD-RTO: 2.7 /100 WBCS (ref 0–0)
OVALOCYTES BLD QL SMEAR: ABNORMAL
PLATELET # BLD AUTO: 104 X10*3/UL (ref 150–450)
POLYCHROMASIA BLD QL SMEAR: ABNORMAL
POTASSIUM SERPL-SCNC: 3.6 MMOL/L (ref 3.5–5.3)
RBC # BLD AUTO: 3.7 X10*6/UL (ref 4–5.2)
RBC MORPH BLD: ABNORMAL
SODIUM SERPL-SCNC: 147 MMOL/L (ref 136–145)
TOTAL CELLS COUNTED BLD: 100
WBC # BLD AUTO: 11.6 X10*3/UL (ref 4.4–11.3)

## 2024-05-17 PROCEDURE — 2500000005 HC RX 250 GENERAL PHARMACY W/O HCPCS: Performed by: INTERNAL MEDICINE

## 2024-05-17 PROCEDURE — 93005 ELECTROCARDIOGRAM TRACING: CPT

## 2024-05-17 PROCEDURE — 36430 TRANSFUSION BLD/BLD COMPNT: CPT

## 2024-05-17 PROCEDURE — 2500000006 HC RX 250 W HCPCS SELF ADMINISTERED DRUGS (ALT 637 FOR ALL PAYERS): Performed by: INTERNAL MEDICINE

## 2024-05-17 PROCEDURE — 2500000004 HC RX 250 GENERAL PHARMACY W/ HCPCS (ALT 636 FOR OP/ED): Performed by: NURSE PRACTITIONER

## 2024-05-17 PROCEDURE — 85007 BL SMEAR W/DIFF WBC COUNT: CPT | Performed by: INTERNAL MEDICINE

## 2024-05-17 PROCEDURE — P9035 PLATELET PHERES LEUKOREDUCED: HCPCS

## 2024-05-17 PROCEDURE — C1892 INTRO/SHEATH,FIXED,PEEL-AWAY: HCPCS | Performed by: INTERNAL MEDICINE

## 2024-05-17 PROCEDURE — 82947 ASSAY GLUCOSE BLOOD QUANT: CPT

## 2024-05-17 PROCEDURE — 99152 MOD SED SAME PHYS/QHP 5/>YRS: CPT | Performed by: INTERNAL MEDICINE

## 2024-05-17 PROCEDURE — 02H63JZ INSERTION OF PACEMAKER LEAD INTO RIGHT ATRIUM, PERCUTANEOUS APPROACH: ICD-10-PCS | Performed by: INTERNAL MEDICINE

## 2024-05-17 PROCEDURE — 83735 ASSAY OF MAGNESIUM: CPT | Performed by: INTERNAL MEDICINE

## 2024-05-17 PROCEDURE — 71045 X-RAY EXAM CHEST 1 VIEW: CPT

## 2024-05-17 PROCEDURE — 2500000001 HC RX 250 WO HCPCS SELF ADMINISTERED DRUGS (ALT 637 FOR MEDICARE OP): Performed by: INTERNAL MEDICINE

## 2024-05-17 PROCEDURE — 2720000007 HC OR 272 NO HCPCS: Performed by: INTERNAL MEDICINE

## 2024-05-17 PROCEDURE — 1200000002 HC GENERAL ROOM WITH TELEMETRY DAILY

## 2024-05-17 PROCEDURE — 2500000004 HC RX 250 GENERAL PHARMACY W/ HCPCS (ALT 636 FOR OP/ED): Performed by: INTERNAL MEDICINE

## 2024-05-17 PROCEDURE — 33208 INSRT HEART PM ATRIAL & VENT: CPT | Performed by: INTERNAL MEDICINE

## 2024-05-17 PROCEDURE — 99153 MOD SED SAME PHYS/QHP EA: CPT | Performed by: INTERNAL MEDICINE

## 2024-05-17 PROCEDURE — 2750000001 HC OR 275 NO HCPCS: Performed by: INTERNAL MEDICINE

## 2024-05-17 PROCEDURE — 82374 ASSAY BLOOD CARBON DIOXIDE: CPT | Performed by: INTERNAL MEDICINE

## 2024-05-17 PROCEDURE — 2500000001 HC RX 250 WO HCPCS SELF ADMINISTERED DRUGS (ALT 637 FOR MEDICARE OP): Performed by: NURSE PRACTITIONER

## 2024-05-17 PROCEDURE — 93010 ELECTROCARDIOGRAM REPORT: CPT | Performed by: INTERNAL MEDICINE

## 2024-05-17 PROCEDURE — 02HK3JZ INSERTION OF PACEMAKER LEAD INTO RIGHT VENTRICLE, PERCUTANEOUS APPROACH: ICD-10-PCS | Performed by: INTERNAL MEDICINE

## 2024-05-17 PROCEDURE — 93280 PM DEVICE PROGR EVAL DUAL: CPT | Performed by: INTERNAL MEDICINE

## 2024-05-17 PROCEDURE — 0JH606Z INSERTION OF PACEMAKER, DUAL CHAMBER INTO CHEST SUBCUTANEOUS TISSUE AND FASCIA, OPEN APPROACH: ICD-10-PCS | Performed by: INTERNAL MEDICINE

## 2024-05-17 PROCEDURE — 93290 INTERROG DEV EVAL ICPMS IP: CPT | Performed by: INTERNAL MEDICINE

## 2024-05-17 PROCEDURE — 2780000003 HC OR 278 NO HCPCS: Performed by: INTERNAL MEDICINE

## 2024-05-17 PROCEDURE — 85027 COMPLETE CBC AUTOMATED: CPT | Performed by: INTERNAL MEDICINE

## 2024-05-17 PROCEDURE — C1898 LEAD, PMKR, OTHER THAN TRANS: HCPCS | Performed by: INTERNAL MEDICINE

## 2024-05-17 PROCEDURE — 93280 PM DEVICE PROGR EVAL DUAL: CPT

## 2024-05-17 PROCEDURE — C1785 PMKR, DUAL, RATE-RESP: HCPCS | Performed by: INTERNAL MEDICINE

## 2024-05-17 PROCEDURE — 2500000002 HC RX 250 W HCPCS SELF ADMINISTERED DRUGS (ALT 637 FOR MEDICARE OP, ALT 636 FOR OP/ED): Performed by: INTERNAL MEDICINE

## 2024-05-17 PROCEDURE — 99233 SBSQ HOSP IP/OBS HIGH 50: CPT | Performed by: INTERNAL MEDICINE

## 2024-05-17 DEVICE — STYLET, LEAD, PACEMAKER, EXTENDED TAPER, BALL-TIP, 52 CM: Type: IMPLANTABLE DEVICE | Site: CHEST | Status: NON-FUNCTIONAL

## 2024-05-17 DEVICE — LEAD 5076-45 CAPSUREFIX NOVUS US EN
Type: IMPLANTABLE DEVICE | Site: CHEST | Status: FUNCTIONAL
Brand: CAPSUREFIX® NOVUS

## 2024-05-17 DEVICE — LEAD 5076-52 CAPSUREFIX NOVUS US EN
Type: IMPLANTABLE DEVICE | Site: CHEST | Status: FUNCTIONAL
Brand: CAPSUREFIX® NOVUS

## 2024-05-17 DEVICE — IPG W1DR01 AZURE XT DR MRI WL USA BCP
Type: IMPLANTABLE DEVICE | Site: CHEST | Status: FUNCTIONAL
Brand: AZURE™ XT DR MRI SURESCAN™

## 2024-05-17 RX ORDER — LIDOCAINE HYDROCHLORIDE 10 MG/ML
INJECTION, SOLUTION EPIDURAL; INFILTRATION; INTRACAUDAL; PERINEURAL AS NEEDED
Status: DISCONTINUED | OUTPATIENT
Start: 2024-05-17 | End: 2024-05-17 | Stop reason: HOSPADM

## 2024-05-17 RX ORDER — METOPROLOL TARTRATE 25 MG/1
25 TABLET, FILM COATED ORAL 2 TIMES DAILY
Status: DISCONTINUED | OUTPATIENT
Start: 2024-05-17 | End: 2024-05-18 | Stop reason: HOSPADM

## 2024-05-17 RX ORDER — TRAMADOL HYDROCHLORIDE 50 MG/1
50 TABLET ORAL EVERY 6 HOURS PRN
Status: DISCONTINUED | OUTPATIENT
Start: 2024-05-17 | End: 2024-05-18 | Stop reason: HOSPADM

## 2024-05-17 RX ORDER — FENTANYL CITRATE 50 UG/ML
INJECTION, SOLUTION INTRAMUSCULAR; INTRAVENOUS AS NEEDED
Status: DISCONTINUED | OUTPATIENT
Start: 2024-05-17 | End: 2024-05-17 | Stop reason: HOSPADM

## 2024-05-17 RX ORDER — MIDAZOLAM HYDROCHLORIDE 1 MG/ML
INJECTION, SOLUTION INTRAMUSCULAR; INTRAVENOUS AS NEEDED
Status: DISCONTINUED | OUTPATIENT
Start: 2024-05-17 | End: 2024-05-17 | Stop reason: HOSPADM

## 2024-05-17 RX ADMIN — MYCOPHENOLATE MOFETIL 1000 MG: 250 CAPSULE ORAL at 20:05

## 2024-05-17 RX ADMIN — LEVETIRACETAM 500 MG: 500 TABLET, FILM COATED ORAL at 09:15

## 2024-05-17 RX ADMIN — METOPROLOL TARTRATE 25 MG: 25 TABLET, FILM COATED ORAL at 20:05

## 2024-05-17 RX ADMIN — FLUDROCORTISONE ACETATE 0.1 MG: 0.1 TABLET ORAL at 09:15

## 2024-05-17 RX ADMIN — INSULIN LISPRO 3 UNITS: 100 INJECTION, SOLUTION INTRAVENOUS; SUBCUTANEOUS at 17:18

## 2024-05-17 RX ADMIN — MUPIROCIN 1 APPLICATION: 20 OINTMENT TOPICAL at 01:25

## 2024-05-17 RX ADMIN — HEPARIN SODIUM 5000 UNITS: 5000 INJECTION INTRAVENOUS; SUBCUTANEOUS at 08:13

## 2024-05-17 RX ADMIN — TIOTROPIUM BROMIDE INHALATION SPRAY 2 PUFF: 3.12 SPRAY, METERED RESPIRATORY (INHALATION) at 08:58

## 2024-05-17 RX ADMIN — PANTOPRAZOLE SODIUM 40 MG: 40 TABLET, DELAYED RELEASE ORAL at 08:18

## 2024-05-17 RX ADMIN — VANCOMYCIN HYDROCHLORIDE 1000 MG: 1 INJECTION, SOLUTION INTRAVENOUS at 06:04

## 2024-05-17 RX ADMIN — HYDROCORTISONE 10 MG: 5 TABLET ORAL at 20:05

## 2024-05-17 RX ADMIN — OLANZAPINE 5 MG: 10 TABLET, ORALLY DISINTEGRATING ORAL at 20:06

## 2024-05-17 RX ADMIN — HYDRALAZINE HYDROCHLORIDE 10 MG: 20 INJECTION INTRAMUSCULAR; INTRAVENOUS at 13:47

## 2024-05-17 RX ADMIN — INSULIN GLARGINE 10 UNITS: 100 INJECTION, SOLUTION SUBCUTANEOUS at 09:11

## 2024-05-17 RX ADMIN — MAGNESIUM OXIDE 400 MG (241.3 MG MAGNESIUM) TABLET 400 MG: TABLET at 09:15

## 2024-05-17 RX ADMIN — MYCOPHENOLATE MOFETIL 1000 MG: 250 CAPSULE ORAL at 09:15

## 2024-05-17 RX ADMIN — Medication: at 04:19

## 2024-05-17 RX ADMIN — INSULIN LISPRO 6 UNITS: 100 INJECTION, SOLUTION INTRAVENOUS; SUBCUTANEOUS at 08:13

## 2024-05-17 RX ADMIN — HYDROCORTISONE 20 MG: 5 TABLET ORAL at 09:15

## 2024-05-17 RX ADMIN — Medication 5 MG: at 20:05

## 2024-05-17 RX ADMIN — INSULIN LISPRO 6 UNITS: 100 INJECTION, SOLUTION INTRAVENOUS; SUBCUTANEOUS at 11:21

## 2024-05-17 RX ADMIN — ATORVASTATIN CALCIUM 40 MG: 20 TABLET, FILM COATED ORAL at 20:05

## 2024-05-17 RX ADMIN — Medication 1 TABLET: at 09:14

## 2024-05-17 RX ADMIN — Medication 100 MG: at 09:15

## 2024-05-17 RX ADMIN — HYDRALAZINE HYDROCHLORIDE 10 MG: 20 INJECTION INTRAMUSCULAR; INTRAVENOUS at 08:35

## 2024-05-17 RX ADMIN — HYDROCORTISONE SODIUM SUCCINATE 100 MG: 100 INJECTION, POWDER, FOR SOLUTION INTRAMUSCULAR; INTRAVENOUS at 05:56

## 2024-05-17 RX ADMIN — FLUTICASONE FUROATE AND VILANTEROL TRIFENATATE 1 PUFF: 200; 25 POWDER RESPIRATORY (INHALATION) at 08:59

## 2024-05-17 RX ADMIN — HEPARIN SODIUM 5000 UNITS: 5000 INJECTION INTRAVENOUS; SUBCUTANEOUS at 17:19

## 2024-05-17 RX ADMIN — LEVETIRACETAM 500 MG: 500 TABLET, FILM COATED ORAL at 20:05

## 2024-05-17 RX ADMIN — HYDRALAZINE HYDROCHLORIDE 10 MG: 20 INJECTION INTRAMUSCULAR; INTRAVENOUS at 01:44

## 2024-05-17 RX ADMIN — FOLIC ACID 1 MG: 1 TABLET ORAL at 09:15

## 2024-05-17 RX ADMIN — ATOVAQUONE 1500 MG: 750 SUSPENSION ORAL at 09:12

## 2024-05-17 ASSESSMENT — PAIN SCALES - GENERAL
PAINLEVEL_OUTOF10: 0 - NO PAIN
PAINLEVEL_OUTOF10: 0 - NO PAIN

## 2024-05-17 ASSESSMENT — PAIN - FUNCTIONAL ASSESSMENT: PAIN_FUNCTIONAL_ASSESSMENT: 0-10

## 2024-05-17 ASSESSMENT — COGNITIVE AND FUNCTIONAL STATUS - GENERAL
DAILY ACTIVITIY SCORE: 13
DRESSING REGULAR LOWER BODY CLOTHING: A LOT
HELP NEEDED FOR BATHING: A LOT
TOILETING: TOTAL
PERSONAL GROOMING: A LOT
DRESSING REGULAR UPPER BODY CLOTHING: A LOT

## 2024-05-17 NOTE — PROGRESS NOTES
ASSESSMENT & PLAN:     Adrenal insufficiency - reportedly 2/2 chronic steroid use for lupus. Was initially in ICU. Improved with IV steroid, PO fludrocortisone. Endo following. Now transitioned to PO hydrocortisone. Cont PO fludrocortisone. Cont atovaquone for pjp ppx (sulfa allergy), ppi for gi ppx. Had ACTH stim test done yesterday, will fu with endo regarding results.     Hx of Afib on OAC, bradycardia, sinus node dysfunction - EP following.. Now s/p PPM on 5/17/24. Tele monitoring. Fu EP recs regarding BB and eliquis.     HTN - has been labile here, is running higher likely from steroids and florinef. Monitor today, may start BB.     Hypernatremia - chronic, seems to always be slightly elevated. Encourage PO water intake. Serial AM BMP.     CKD3 - Scr at baseline, monitor    Acute metabolic encephalopathy - likely 2/2 adrenal insufficiency, was followed by neuro, extensive lawrence was done in ICU and all negative. Now resolved and A+Ox4.      SLE - cont pta cellcept, pta steroids changed to hydrocortisone for now as above    COPD - no pfts on file, not in acute exacerbation, cont home inhalers    CAD s/p CABG (2013), DLD - cont statin, unclear why taken off aspirin, not mentioned in recent cards note, possibly 2/2 thrombocytopenia. May start BB now that PPM is in place.     HFmrEF - TTE from 5/2024 showed LVEF 40-45%. Currently compensated. Has been unable to tolerate GDMT in the past due to allergies and co-morbidities. Maybe starting BB now that PPM placed. Outpt fu with cardiology.     Hx of psychosis with visual hallucinations - cont zyprexa nightly, tolerating well    Focal seizures - home Keppra    Leukocytosis - steroid induced    Chronic anemia, thrombocytopenia - stable, no active bleeding, s/p 1u pRBC and 1u plt this admission for optimization prior to ppm.     DM - ?steroid induced. Cont basal bolus insulin as ordered    GERD - ppi    Vte ppx sqh  Dispo is snf, ltc resident, likely dc within next 24  hours    Mitesh Agarwal MD    SUBJECTIVE     S/p PPM today. No acute complaints currently. Remains alert and oriented. Hypertensive.     OBJECTIVE:       Last Recorded Vitals:  Vitals:    05/17/24 0815 05/17/24 0831 05/17/24 0912 05/17/24 1100   BP: (!) 180/107 (!) 201/110 (!) 172/100 (!) 176/35   BP Location:       Patient Position:       Pulse: 61 60 84 64   Resp:       Temp:    35.5 °C (95.9 °F)   TempSrc:       SpO2: 98% 98% 98% 98%   Weight:       Height:           Last I/O:  I/O last 3 completed shifts:  In: 1596 (15.6 mL/kg) [I.V.:1215 (11.9 mL/kg); Blood:381]  Out: 2700 (26.5 mL/kg) [Urine:2700 (0.7 mL/kg/hr)]  Weight: 102 kg     Physical Exam:  GEN: appears stated age, NAD  CV: RRR, no m/r/g, no LE edema  LUNGS: CTAB  ABD: soft, NT  NEURO: A+Ox4, no FND  PSYCH: appropriate mood, affect    Inpatient Medications:  [Held by provider] apixaban, 2.5 mg, oral, q12h  atorvastatin, 40 mg, oral, Daily  atovaquone, 1,500 mg, oral, Daily  fludrocortisone, 0.1 mg, oral, Daily  tiotropium, 2 puff, inhalation, Daily   And  fluticasone furoate-vilanteroL, 1 puff, inhalation, Daily  folic acid, 1 mg, oral, Daily  heparin (porcine), 5,000 Units, subcutaneous, q8h  hydrocortisone, 10 mg, oral, Nightly  hydrocortisone, 20 mg, oral, Daily  insulin glargine, 10 Units, subcutaneous, q AM  insulin lispro, 0-15 Units, subcutaneous, TID  levETIRAcetam, 500 mg, oral, BID  magnesium oxide, 400 mg, oral, Daily  melatonin, 5 mg, oral, Nightly  multivitamin with minerals, 1 tablet, oral, Daily  mycophenolate, 1,000 mg, oral, BID  OLANZapine zydis, 5 mg, oral, Nightly  pantoprazole, 40 mg, oral, Daily before breakfast  thiamine, 100 mg, oral, Daily        PRN Medications  PRN medications: acetaminophen **OR** acetaminophen, alteplase, dextrose, dextrose, glucagon, hydrALAZINE, ipratropium-albuteroL, ipratropium-albuteroL, ondansetron, oxygen, traMADol    Continuous Medications:           LABS AND IMAGING:     Labs:  Results for orders  placed or performed during the hospital encounter of 05/07/24 (from the past 24 hour(s))   Prepare RBC: 1 Units   Result Value Ref Range    PRODUCT CODE U3914D35     Unit Number [id number]     Unit ABO O     Unit RH POS     XM INTEP COMP     Dispense Status TR     Blood Expiration Date June 01, 2024 23:59 EDT     PRODUCT BLOOD TYPE 5100     UNIT VOLUME 350    Prepare Platelets: 1 Units   Result Value Ref Range    PRODUCT CODE A1555E56     Unit Number [id number]     Unit ABO A     Unit RH POS     Dispense Status IS     Blood Expiration Date May 18, 2024 23:59 EDT     PRODUCT BLOOD TYPE 6200     UNIT VOLUME 223    Cortisol Baseline   Result Value Ref Range    Baseline Cortisol 29.4 ug/dL   Type and screen   Result Value Ref Range    ABO TYPE O     Rh TYPE POS     ANTIBODY SCREEN NEG    POCT GLUCOSE   Result Value Ref Range    POCT Glucose 150 (H) 74 - 99 mg/dL   Cortisol 30 minute   Result Value Ref Range    30 minute Cortisol  27.7 ug/dL   Cortisol 60 minute   Result Value Ref Range    60 minute Cortisol  28.7 ug/dL   POCT GLUCOSE   Result Value Ref Range    POCT Glucose 173 (H) 74 - 99 mg/dL   CBC   Result Value Ref Range    WBC 11.3 4.4 - 11.3 x10*3/uL    nRBC 4.5 (H) 0.0 - 0.0 /100 WBCs    RBC 3.51 (L) 4.00 - 5.20 x10*6/uL    Hemoglobin 10.6 (L) 12.0 - 16.0 g/dL    Hematocrit 34.0 (L) 36.0 - 46.0 %    MCV 97 80 - 100 fL    MCH 30.2 26.0 - 34.0 pg    MCHC 31.2 (L) 32.0 - 36.0 g/dL    RDW 19.2 (H) 11.5 - 14.5 %    Platelets 76 (L) 150 - 450 x10*3/uL   POCT GLUCOSE   Result Value Ref Range    POCT Glucose 217 (H) 74 - 99 mg/dL   ECG 12 lead STAT   Result Value Ref Range    Ventricular Rate 61 BPM    Atrial Rate 61 BPM    FL Interval 188 ms    QRS Duration 92 ms    QT Interval 450 ms    QTC Calculation(Bazett) 453 ms    P Axis 52 degrees    R Axis -12 degrees    T Axis -49 degrees    QRS Count 10 beats    Q Onset 215 ms    P Onset 128 ms    P Offset 195 ms    T Offset 440 ms    QTC Fredericia 452 ms   CBC  and Auto Differential   Result Value Ref Range    WBC 11.6 (H) 4.4 - 11.3 x10*3/uL    nRBC 2.7 (H) 0.0 - 0.0 /100 WBCs    RBC 3.70 (L) 4.00 - 5.20 x10*6/uL    Hemoglobin 11.0 (L) 12.0 - 16.0 g/dL    Hematocrit 35.7 (L) 36.0 - 46.0 %    MCV 97 80 - 100 fL    MCH 29.7 26.0 - 34.0 pg    MCHC 30.8 (L) 32.0 - 36.0 g/dL    RDW 19.9 (H) 11.5 - 14.5 %    Platelets 104 (L) 150 - 450 x10*3/uL    Immature Granulocytes %, Automated 6.6 (H) 0.0 - 0.9 %    Immature Granulocytes Absolute, Automated 0.76 (H) 0.00 - 0.70 x10*3/uL   Basic Metabolic Panel   Result Value Ref Range    Glucose 213 (H) 74 - 99 mg/dL    Sodium 147 (H) 136 - 145 mmol/L    Potassium 3.6 3.5 - 5.3 mmol/L    Chloride 107 98 - 107 mmol/L    Bicarbonate 36 (H) 21 - 32 mmol/L    Anion Gap 8 (L) 10 - 20 mmol/L    Urea Nitrogen 20 6 - 23 mg/dL    Creatinine 1.32 (H) 0.50 - 1.05 mg/dL    eGFR 46 (L) >60 mL/min/1.73m*2    Calcium 7.9 (L) 8.6 - 10.3 mg/dL   Magnesium   Result Value Ref Range    Magnesium 1.73 1.60 - 2.40 mg/dL   SST TOP   Result Value Ref Range    Extra Tube Hold for add-ons.    Manual Differential   Result Value Ref Range    Neutrophils %, Manual 86.0 40.0 - 80.0 %    Bands %, Manual 5.0 0.0 - 5.0 %    Lymphocytes %, Manual 6.0 13.0 - 44.0 %    Monocytes %, Manual 3.0 2.0 - 10.0 %    Eosinophils %, Manual 0.0 0.0 - 6.0 %    Basophils %, Manual 0.0 0.0 - 2.0 %    Seg Neutrophils Absolute, Manual 9.98 (H) 1.20 - 7.00 x10*3/uL    Bands Absolute, Manual 0.58 0.00 - 0.70 x10*3/uL    Lymphocytes Absolute, Manual 0.70 (L) 1.20 - 4.80 x10*3/uL    Monocytes Absolute, Manual 0.35 0.10 - 1.00 x10*3/uL    Eosinophils Absolute, Manual 0.00 0.00 - 0.70 x10*3/uL    Basophils Absolute, Manual 0.00 0.00 - 0.10 x10*3/uL    Total Cells Counted 100     Neutrophils Absolute, Manual 10.56 (H) 1.20 - 7.70 x10*3/uL    RBC Morphology See Below     Polychromasia Mild     Ovalocytes Few     Katarzyna Cells Few    Cardiac device check - Inpatient   Result Value Ref Range    BSA 2.18  m2   POCT GLUCOSE   Result Value Ref Range    POCT Glucose 237 (H) 74 - 99 mg/dL        Imaging:  ECG 12 lead STAT  Atrial-paced rhythm  Minimal voltage criteria for LVH, may be normal variant  Abnormal ECG  When compared with ECG of 16-MAY-2024 06:58, (unconfirmed)  Electronic atrial pacemaker has replaced Sinus rhythm  XR chest 1 view  Narrative: Interpreted By:  Hay Tucker,   STUDY:  XR CHEST 1 VIEW;  5/17/2024 8:04 am      INDICATION:  Signs/Symptoms:post implant.      COMPARISON:  05/10/2024.      ACCESSION NUMBER(S):  [id number]      ORDERING CLINICIAN:  LEIGHA DUGGAN      FINDINGS:  CARDIOMEDIASTINAL SILHOUETTE:  Patient is rotated. There has been interval placement of a left-sided  dual lead cardiac pacing device. Lead tips overlie the right atrium  and right ventricle regions respectively. Cardiomegaly and aortic  calcifications are similar to prior.      LUNGS:  Inspiratory volume is low. Interstitial prominence is present with  irregular infiltrates in the infrahilar regions and lung bases. Small  pleural effusions not excluded. No appreciable pneumothorax.      ABDOMEN:  No remarkable upper abdominal findings.      BONES:  No acute osseous changes.      Impression: 1.  Interval placement of a left-sided dual lead cardiac pacing  device with leads overlying the right atrium and right ventricle  levels respectively. No pneumothorax.  2. Low inspiratory volume. Interstitial prominence with irregular  infiltrates in the infrahilar regions in the lung bases may be due to  vascular congestion/edema. Infection not excluded.              MACRO:  None.      Signed by: aHy Tucker 5/17/2024 8:18 AM  Dictation workstation:   [id number]  Electrophysiology procedure  Table formatting from the original result was not included.  Procedure Details:    Permanent Pacemaker System Implantation Dual Chamber  Summary:  ·   Successful implantation of a dual-chamber permanent pacemaker.   ·   The pacing and sensing  thresholds were satisfactory.   Recommendations:  1. A chest X-ray should be performed and telemetry monitoring continued   for 24 hours.   2. A 12 lead ECG should be performed prior to discharge from the hospital.     3. The patient should continue with the present medications.   Discharge:   1. The patient recovered uneventfully from the effects of conscious   sedation. The patient left the EP laboratory in stable condition and was   transferred to the telemetry unit .  Follow up:   1. The patient will return to telemetry if recovery parameters are   appropriate.  The patient should be alert for bleeding, swelling, or signs   of infection. The patient should call the electrophysiologist immediately   if symptoms recur, or for any problems. The patient has been instructed   accordingly.   2. Follow up with [facility] office in seven days for post-operative wound   assessment.   3. Follow up with [facility] in twelve weeks for routine device   analysis and reprogramming if necessary. Remote monitoring will be   instituted if possible.   ________________________________________  Procedures:  Pocket fashioned. Dual-chamber permanent pacemaker implantation. Device   testing.   ________________________________________  Patient history:  Please refer to the detailed history and physical on the patient's medical   chart.  History of significant bradycardia, atrial fibrillation.  Coronary   artery disease.  Patient is a scheduled for dual-chamber pacemaker   implantation  ________________________________________  Procedure narrative:  The risks, benefits, and alternatives to the procedure and sedation were   explained to the patient, and informed consent was obtained. The patient   was in the fasting state. A grounding pad was placed. Self-adhesive   anterior-posterior defibrillation pads were applied. A ZOLL defibrillator   was used for monitoring and the defibrillator waveform was set to   biphasic. The patient was set up  for continuous monitoring of surface 12   lead ECG and pulse oximetry. Blood pressure was monitored. The procedure   was performed under IV conscious sedation. The upper chest was prepped and   draped in the usual sterile fashion. Local anesthesia: After preoperative   IV antibiotic was completely infused, subcutaneous tissues just medial to   the left deltopectoral area, were infiltrated with Lidocaine 1 % for local   anesthesia.   1. Using a #15 scalpel, an incision was made, which was extended to the   prepectoral fascia using blunt dissection.  The left cephalic vein was   identified.  Using Jarrell's.  Venotomy was created.  The distal part was   tied off.  The left cephalic vein was small and unable to accommodate the   wire.  The proximal part was tied off again.    2. The  left subclavian vein was accessed using the Seldinger technique.   3. A guidewire was advanced to the heart under fluoroscopic guidance, a   sheath was placed over the guidewire, and the guidewire was retained and   the dilator was removed. A lead was then advanced to the heart via   fluoroscopic guidance, and the sheath was peeled away. The ventricle was   mapped, and the lead was fixed to the right ventricular low septum. After   lead placement, appropriate sensing and thresholds were obtained.  No   diaphragmatic pacing occurred at 10V and 1.5ms.  A second sheath was   placed over the wire and the dilator were  removed. A pacemaker lead was   advanced to the heart under fluoroscopy guidance and the sheath was peeled   away. The lead was placed in the right atrial appendage. After lead   placement, appropriate sensing and threshols were obtained. No   diaphragmatic pacing occurred at 10V and 1.5ms.     4. The lead was sutured in place to the pectoralis muscle using O-Ethibond   suture. Hemostasis was obtained with bovie cautery, one purse string   sutures of O-Ethibond, and Vilma. Lead measurements were reevaluated.   5. A left  prepectoral pocket was fashioned.   6. A dual-chamber permanent pacemaker was attached to the lead and   implanted.   7. The device was interrogated and its parameters recorded; telemetered   electrograms and pacing and sensing thresholds were measured.   8. The pocket was flushed with saline and vancomycin. Wound hemostasis was   obtained with electrocautery. The wound was closed in three layers. The   skin was approximated with subcuticular suture and steri-strips. A   pressure dressing was applied.   The patient was transferred to the telemetry unit.   ________________________________________  Complications:  The patient tolerated the procedure without any complications or incident.     ________________________________________  Prepared and signed by    Tolerance: good   Complications: None     Estimated blood loss 10 cc    Device implantation    MedCerelink Tatianna XT DR MRI model number W1  number [id number].    Implanted May 17, 2024.    Right atrial lead.    Medtronic 5076/45.  Send number [id number].  Implanted May 17, 2024.    Location right and appendage.  Capture 1.4 at 0.4 ms.  Impedance 760 ohms.    EGM amplitude 1.7 mV.  Diaphragmatic stimulation none.  Status active.    Right ventricular lead    Medtronic 507 6-52.  Serial number PJ and AR is [id number].  Implanted May 17,   2024.  Location right ventricular low septum.  Capture 0.8 V at 0.4 ms.    Impedance 513 ohms.  EGM amplitude 10.0 mV.  Diaphragmatic stimulation   none.  Status active.

## 2024-05-17 NOTE — CARE PLAN
Problem: Skin  Goal: Decreased wound size/increased tissue granulation at next dressing change  Outcome: Progressing  Flowsheets (Taken 5/17/2024 1114)  Decreased wound size/increased tissue granulation at next dressing change:   Promote sleep for wound healing   Protective dressings over bony prominences  Goal: Participates in plan/prevention/treatment measures  Outcome: Progressing  Flowsheets (Taken 5/17/2024 1114)  Participates in plan/prevention/treatment measures:   Discuss with provider PT/OT consult   Elevate heels   Increase activity/out of bed for meals  Note: PT/OT consulted   Foam dressings over stage 2 pressure injury   Goal: Prevent/manage excess moisture  Outcome: Progressing  Flowsheets (Taken 5/17/2024 1114)  Prevent/manage excess moisture:   Cleanse incontinence/protect with barrier cream   Monitor for/manage infection if present  Goal: Prevent/minimize sheer/friction injuries  Outcome: Progressing  Flowsheets (Taken 5/17/2024 1114)  Prevent/minimize sheer/friction injuries:   HOB 30 degrees or less   Turn/reposition every 2 hours/use positioning/transfer devices   Use pull sheet  Goal: Promote/optimize nutrition  Outcome: Progressing  Flowsheets (Taken 5/17/2024 1114)  Promote/optimize nutrition: Consume > 50% meals/supplements  Goal: Promote skin healing  Outcome: Progressing  Flowsheets (Taken 5/17/2024 1114)  Promote skin healing:   Turn/reposition every 2 hours/use positioning/transfer devices   Protective dressings over bony prominences      The clinical goals for the shift include patient's heart rate will be in normal limits for shift

## 2024-05-17 NOTE — PROGRESS NOTES
"Nutrition Follow Up Assessment:   Nutrition Assessment         Patient is a 63 y.o. female presenting with disorientation      Nutrition History:  Energy Intake: Good > 75 %  Food and Nutrient History: RD follow-up. Pt documented as A&Ox4 at time of RD visit. Says PTA was eating 3 meals per day, prepared by [facility] facility. Reporting good appetite/intakes currently. Nursing documentation supports this. Currently on cardiac diet, is agreeable to adding 60g carb controlled diet to assist with controlling blood glucose. Wishing to continue Glucerna BID.  Food Allergies/Intolerances:  None  GI Symptoms: None  Oral Problems:  Pt denies chewing/swallowing concerns.       Anthropometrics:  Height: 167.6 cm (5' 6\")   Weight: 102 kg (224 lb 13.9 oz) (wt from 5/10/24)   BMI (Calculated): 36.31  IBW/kg (Dietitian Calculated): 59.1 kg          Weight Change %:  Weight History / % Weight Change: no new wt since last RD note    Nutrition Focused Physical Exam Findings:  defer: pt wishing to rest    Edema:  Edema: +1 trace  Edema Location: generalized edema per nursing assessment  Physical Findings:  Skin: Positive (skin tear, stage 2 to right buttocks per nursing assessment)    Nutrition Significant Labs:  BMP Trend:   Results from last 7 days   Lab Units 05/17/24  0827 05/16/24  0504 05/15/24  1403 05/15/24  0308   GLUCOSE mg/dL 213* 259* 121* 336*   CALCIUM mg/dL 7.9* 7.8* 7.8* 7.8*   SODIUM mmol/L 147* 147* 148* 149*   POTASSIUM mmol/L 3.6 3.7 3.2* 3.4*   CO2 mmol/L 36* 36* 33* 34*   CHLORIDE mmol/L 107 107 109* 110*   BUN mg/dL 20 19 21 22   CREATININE mg/dL 1.32* 1.38* 1.44* 1.47*        Nutrition Specific Medications:  Reviewed    I/O:   Last BM Date: 05/16/24; Stool Appearance: Loose (05/15/24 1300)    Dietary Orders (From admission, onward)       Start     Ordered    05/17/24 0730  Adult diet Cardiac; 70 gm fat; 2 - 3 grams Sodium  Diet effective now        Question Answer Comment   Diet type Cardiac    Fat restriction: 70 " gm fat    Sodium restriction: 2 - 3 grams Sodium        05/17/24 0730    05/13/24 1441  Oral nutritional supplements  Until discontinued        Question Answer Comment   Deliver with Lunch    Deliver with Dinner    Select supplement: Glucerna Shake        05/13/24 1440                     Estimated Needs:   Total Energy Estimated Needs (kCal): 2040 kCal  Method for Estimating Needs: 20 kcal/kg ABW  Total Protein Estimated Needs (g): 61 g  Method for Estimating Needs: 61-82 g (0.6-0.8 g/kg ABW) or as renal function allows  Total Fluid Estimated Needs (mL): 2040 mL  Method for Estimating Needs: 1 ml/kcal or per MD        Nutrition Diagnosis   Malnutrition Diagnosis  Patient has Malnutrition Diagnosis: No    Nutrition Diagnosis  Patient has Nutrition Diagnosis: Yes  Diagnosis Status (1): Resolved  Nutrition Diagnosis 1: Inadequate oral intake  Related to (1): altered mental status  As Evidenced by (1): NPO at time of admission, recent diet advancement, <75% intake at meals  Additional Nutrition Diagnosis: Diagnosis 2  Diagnosis Status (2): New  Nutrition Diagnosis 2: Altered nutrition related to laboratory values  Related to (2): endocrine dysfunction  As Evidenced by (2): blood glucose not consistently <180       Nutrition Interventions/Recommendations         Nutrition Prescription:  Individualized Nutrition Prescription Provided for : Cardiac, 60g carb controlled diet. Oral nutritional supplements.        Nutrition Interventions:   Interventions: Meals and snacks, Medical food supplement  Goal: Consumes 3 meals per day  Medical Food Supplement: Commercial beverage  Goal: Glucerna BID (provides 220 kcal, 10 g protein per serving)      Nutrition Education:      Not appropriate    Nutrition Monitoring and Evaluation   Food/Nutrient Related History Monitoring  Monitoring and Evaluation Plan: Energy intake, Amount of food, Fluid intake  Energy Intake: Estimated energy intake  Criteria: Pt meets >75% of estimated energy  needs  Fluid Intake: Estimated fluid intake  Criteria: Meets >75% of estimated fluid needs  Amount of Food: Estimated amout of food, Medical food intake  Criteria: Pt consumes >75% of meals and supplements    Body Composition/Growth/Weight History  Monitoring and Evaluation Plan: Weight  Weight: Measured weight  Criteria: Maintains stable weight    Biochemical Data, Medical Tests and Procedures  Monitoring and Evaluation Plan: Glucose/endocrine profile, Electrolyte/renal panel  Electrolyte and Renal Panel: Sodium, Potassium, Phosphorus  Criteria: Electrolytes WNL  Glucose/Endocrine Profile: Glucose, casual  Criteria: BG within desirable range            Time Spent/Follow-up Reminder:   Time Spent (min): 30 minutes  Last Date of Nutrition Visit: 05/17/24  Nutrition Follow-Up Needed?: 5-7 days

## 2024-05-17 NOTE — PROGRESS NOTES
Speech-Language Pathology                 Therapy Communication Note    Patient Name: Bing Holliday  MRN: [id number]  Today's Date: 5/17/2024     Discipline: Speech Language Pathology    Missed Visit Reason:  Pt with increased lethargy and difficulty staying awake following PPM implant this AM. Will attempt when more alert and cooperative.     Missed Time: Attempt    Comment:

## 2024-05-18 ENCOUNTER — APPOINTMENT (OUTPATIENT)
Dept: CARDIOLOGY | Facility: HOSPITAL | Age: 64
DRG: 981 | End: 2024-05-18
Payer: MEDICARE

## 2024-05-18 VITALS
HEIGHT: 66 IN | TEMPERATURE: 96.1 F | WEIGHT: 224.87 LBS | DIASTOLIC BLOOD PRESSURE: 81 MMHG | RESPIRATION RATE: 16 BRPM | OXYGEN SATURATION: 97 % | SYSTOLIC BLOOD PRESSURE: 156 MMHG | HEART RATE: 60 BPM | BODY MASS INDEX: 36.14 KG/M2

## 2024-05-18 LAB
ANION GAP SERPL CALC-SCNC: 11 MMOL/L (ref 10–20)
ATRIAL RATE: 44 BPM
ATRIAL RATE: 47 BPM
ATRIAL RATE: 60 BPM
ATRIAL RATE: 61 BPM
BASOPHILS # BLD AUTO: 0.03 X10*3/UL (ref 0–0.1)
BASOPHILS NFR BLD AUTO: 0.3 %
BLOOD EXPIRATION DATE: NORMAL
BUN SERPL-MCNC: 25 MG/DL (ref 6–23)
CALCIUM SERPL-MCNC: 7.8 MG/DL (ref 8.6–10.3)
CHLORIDE SERPL-SCNC: 106 MMOL/L (ref 98–107)
CO2 SERPL-SCNC: 34 MMOL/L (ref 21–32)
CREAT SERPL-MCNC: 1.4 MG/DL (ref 0.5–1.05)
DISPENSE STATUS: NORMAL
EGFRCR SERPLBLD CKD-EPI 2021: 43 ML/MIN/1.73M*2
EOSINOPHIL # BLD AUTO: 0.03 X10*3/UL (ref 0–0.7)
EOSINOPHIL NFR BLD AUTO: 0.3 %
ERYTHROCYTE [DISTWIDTH] IN BLOOD BY AUTOMATED COUNT: 19.9 % (ref 11.5–14.5)
GLUCOSE BLD MANUAL STRIP-MCNC: 123 MG/DL (ref 74–99)
GLUCOSE BLD MANUAL STRIP-MCNC: 224 MG/DL (ref 74–99)
GLUCOSE SERPL-MCNC: 111 MG/DL (ref 74–99)
HCT VFR BLD AUTO: 32 % (ref 36–46)
HGB BLD-MCNC: 9.9 G/DL (ref 12–16)
HOLD SPECIMEN: NORMAL
IMM GRANULOCYTES # BLD AUTO: 0.44 X10*3/UL (ref 0–0.7)
IMM GRANULOCYTES NFR BLD AUTO: 3.8 % (ref 0–0.9)
LYMPHOCYTES # BLD AUTO: 1.49 X10*3/UL (ref 1.2–4.8)
LYMPHOCYTES NFR BLD AUTO: 12.7 %
MAGNESIUM SERPL-MCNC: 1.69 MG/DL (ref 1.6–2.4)
MCH RBC QN AUTO: 30.3 PG (ref 26–34)
MCHC RBC AUTO-ENTMCNC: 30.9 G/DL (ref 32–36)
MCV RBC AUTO: 98 FL (ref 80–100)
MONOCYTES # BLD AUTO: 0.68 X10*3/UL (ref 0.1–1)
MONOCYTES NFR BLD AUTO: 5.8 %
NEUTROPHILS # BLD AUTO: 9.04 X10*3/UL (ref 1.2–7.7)
NEUTROPHILS NFR BLD AUTO: 77.1 %
NRBC BLD-RTO: 3.1 /100 WBCS (ref 0–0)
P AXIS: 32 DEGREES
P AXIS: 52 DEGREES
P AXIS: 57 DEGREES
P AXIS: 66 DEGREES
P OFFSET: 195 MS
P OFFSET: 197 MS
P OFFSET: 204 MS
P OFFSET: 209 MS
P ONSET: 123 MS
P ONSET: 128 MS
P ONSET: 146 MS
P ONSET: 150 MS
PLATELET # BLD AUTO: 96 X10*3/UL (ref 150–450)
POTASSIUM SERPL-SCNC: 3.8 MMOL/L (ref 3.5–5.3)
PR INTERVAL: 134 MS
PR INTERVAL: 138 MS
PR INTERVAL: 188 MS
PR INTERVAL: 200 MS
PRODUCT BLOOD TYPE: 6200
PRODUCT CODE: NORMAL
Q ONSET: 215 MS
Q ONSET: 217 MS
QRS COUNT: 10 BEATS
QRS COUNT: 10 BEATS
QRS COUNT: 8 BEATS
QRS COUNT: 8 BEATS
QRS DURATION: 104 MS
QRS DURATION: 104 MS
QRS DURATION: 92 MS
QRS DURATION: 92 MS
QT INTERVAL: 450 MS
QT INTERVAL: 460 MS
QT INTERVAL: 476 MS
QT INTERVAL: 502 MS
QTC CALCULATION(BAZETT): 421 MS
QTC CALCULATION(BAZETT): 429 MS
QTC CALCULATION(BAZETT): 453 MS
QTC CALCULATION(BAZETT): 460 MS
QTC FREDERICIA: 438 MS
QTC FREDERICIA: 452 MS
QTC FREDERICIA: 452 MS
QTC FREDERICIA: 460 MS
R AXIS: -10 DEGREES
R AXIS: -12 DEGREES
R AXIS: -15 DEGREES
R AXIS: -15 DEGREES
RBC # BLD AUTO: 3.27 X10*6/UL (ref 4–5.2)
SODIUM SERPL-SCNC: 147 MMOL/L (ref 136–145)
T AXIS: -18 DEGREES
T AXIS: -34 DEGREES
T AXIS: -49 DEGREES
T AXIS: -54 DEGREES
T OFFSET: 440 MS
T OFFSET: 445 MS
T OFFSET: 453 MS
T OFFSET: 468 MS
UNIT ABO: NORMAL
UNIT NUMBER: NORMAL
UNIT RH: NORMAL
UNIT VOLUME: 223
VENTRICULAR RATE: 44 BPM
VENTRICULAR RATE: 47 BPM
VENTRICULAR RATE: 60 BPM
VENTRICULAR RATE: 61 BPM
WBC # BLD AUTO: 11.7 X10*3/UL (ref 4.4–11.3)

## 2024-05-18 PROCEDURE — 2500000004 HC RX 250 GENERAL PHARMACY W/ HCPCS (ALT 636 FOR OP/ED): Performed by: INTERNAL MEDICINE

## 2024-05-18 PROCEDURE — 92507 TX SP LANG VOICE COMM INDIV: CPT | Mod: GN | Performed by: SPEECH-LANGUAGE PATHOLOGIST

## 2024-05-18 PROCEDURE — 2500000001 HC RX 250 WO HCPCS SELF ADMINISTERED DRUGS (ALT 637 FOR MEDICARE OP): Performed by: INTERNAL MEDICINE

## 2024-05-18 PROCEDURE — 83735 ASSAY OF MAGNESIUM: CPT | Performed by: INTERNAL MEDICINE

## 2024-05-18 PROCEDURE — 99239 HOSP IP/OBS DSCHRG MGMT >30: CPT | Performed by: INTERNAL MEDICINE

## 2024-05-18 PROCEDURE — 2500000004 HC RX 250 GENERAL PHARMACY W/ HCPCS (ALT 636 FOR OP/ED): Performed by: NURSE PRACTITIONER

## 2024-05-18 PROCEDURE — 85025 COMPLETE CBC W/AUTO DIFF WBC: CPT | Performed by: INTERNAL MEDICINE

## 2024-05-18 PROCEDURE — 2500000002 HC RX 250 W HCPCS SELF ADMINISTERED DRUGS (ALT 637 FOR MEDICARE OP, ALT 636 FOR OP/ED): Performed by: INTERNAL MEDICINE

## 2024-05-18 PROCEDURE — 93010 ELECTROCARDIOGRAM REPORT: CPT | Performed by: INTERNAL MEDICINE

## 2024-05-18 PROCEDURE — 82310 ASSAY OF CALCIUM: CPT | Performed by: INTERNAL MEDICINE

## 2024-05-18 PROCEDURE — 82947 ASSAY GLUCOSE BLOOD QUANT: CPT

## 2024-05-18 PROCEDURE — 2500000006 HC RX 250 W HCPCS SELF ADMINISTERED DRUGS (ALT 637 FOR ALL PAYERS): Performed by: INTERNAL MEDICINE

## 2024-05-18 PROCEDURE — 2500000001 HC RX 250 WO HCPCS SELF ADMINISTERED DRUGS (ALT 637 FOR MEDICARE OP): Performed by: NURSE PRACTITIONER

## 2024-05-18 PROCEDURE — 82960 TEST FOR G6PD ENZYME: CPT | Mod: ELYLAB | Performed by: INTERNAL MEDICINE

## 2024-05-18 PROCEDURE — 93005 ELECTROCARDIOGRAM TRACING: CPT

## 2024-05-18 RX ORDER — OLANZAPINE 5 MG/1
5 TABLET ORAL NIGHTLY
Status: ON HOLD
Start: 2024-05-18

## 2024-05-18 RX ORDER — METOPROLOL TARTRATE 25 MG/1
25 TABLET, FILM COATED ORAL 2 TIMES DAILY
Status: ON HOLD
Start: 2024-05-18

## 2024-05-18 RX ORDER — HYDROCORTISONE 10 MG/1
10 TABLET ORAL NIGHTLY
Status: ON HOLD
Start: 2024-05-18

## 2024-05-18 RX ORDER — ATOVAQUONE 750 MG/5ML
1500 SUSPENSION ORAL DAILY
Status: ON HOLD
Start: 2024-05-19

## 2024-05-18 RX ORDER — FLUDROCORTISONE ACETATE 0.1 MG/1
0.1 TABLET ORAL EVERY OTHER DAY
Status: ON HOLD
Start: 2024-05-18

## 2024-05-18 RX ORDER — HYDROCORTISONE 20 MG/1
20 TABLET ORAL DAILY
Status: ON HOLD
Start: 2024-05-19

## 2024-05-18 RX ADMIN — HEPARIN SODIUM 5000 UNITS: 5000 INJECTION INTRAVENOUS; SUBCUTANEOUS at 09:19

## 2024-05-18 RX ADMIN — FLUTICASONE FUROATE AND VILANTEROL TRIFENATATE 1 PUFF: 200; 25 POWDER RESPIRATORY (INHALATION) at 06:15

## 2024-05-18 RX ADMIN — MAGNESIUM OXIDE 400 MG (241.3 MG MAGNESIUM) TABLET 400 MG: TABLET at 09:11

## 2024-05-18 RX ADMIN — MYCOPHENOLATE MOFETIL 1000 MG: 250 CAPSULE ORAL at 09:11

## 2024-05-18 RX ADMIN — HYDROCORTISONE 20 MG: 5 TABLET ORAL at 09:11

## 2024-05-18 RX ADMIN — PANTOPRAZOLE SODIUM 40 MG: 40 TABLET, DELAYED RELEASE ORAL at 06:15

## 2024-05-18 RX ADMIN — INSULIN GLARGINE 10 UNITS: 100 INJECTION, SOLUTION SUBCUTANEOUS at 09:12

## 2024-05-18 RX ADMIN — LEVETIRACETAM 500 MG: 500 TABLET, FILM COATED ORAL at 09:11

## 2024-05-18 RX ADMIN — Medication 100 MG: at 09:12

## 2024-05-18 RX ADMIN — INSULIN LISPRO 6 UNITS: 100 INJECTION, SOLUTION INTRAVENOUS; SUBCUTANEOUS at 12:32

## 2024-05-18 RX ADMIN — FLUDROCORTISONE ACETATE 0.1 MG: 0.1 TABLET ORAL at 09:12

## 2024-05-18 RX ADMIN — METOPROLOL TARTRATE 25 MG: 25 TABLET, FILM COATED ORAL at 09:11

## 2024-05-18 RX ADMIN — ATOVAQUONE 1500 MG: 750 SUSPENSION ORAL at 09:12

## 2024-05-18 RX ADMIN — HEPARIN SODIUM 5000 UNITS: 5000 INJECTION INTRAVENOUS; SUBCUTANEOUS at 00:47

## 2024-05-18 RX ADMIN — TIOTROPIUM BROMIDE INHALATION SPRAY 2 PUFF: 3.12 SPRAY, METERED RESPIRATORY (INHALATION) at 06:15

## 2024-05-18 RX ADMIN — Medication 1 TABLET: at 09:11

## 2024-05-18 RX ADMIN — FOLIC ACID 1 MG: 1 TABLET ORAL at 09:11

## 2024-05-18 ASSESSMENT — PAIN - FUNCTIONAL ASSESSMENT: PAIN_FUNCTIONAL_ASSESSMENT: 0-10

## 2024-05-18 ASSESSMENT — COGNITIVE AND FUNCTIONAL STATUS - GENERAL
DRESSING REGULAR UPPER BODY CLOTHING: A LOT
MOBILITY SCORE: 10
DRESSING REGULAR LOWER BODY CLOTHING: A LOT
TURNING FROM BACK TO SIDE WHILE IN FLAT BAD: A LOT
DAILY ACTIVITIY SCORE: 14
MOVING FROM LYING ON BACK TO SITTING ON SIDE OF FLAT BED WITH BEDRAILS: A LOT
HELP NEEDED FOR BATHING: A LOT
CLIMB 3 TO 5 STEPS WITH RAILING: TOTAL
STANDING UP FROM CHAIR USING ARMS: A LOT
MOVING TO AND FROM BED TO CHAIR: A LOT
TOILETING: A LOT
WALKING IN HOSPITAL ROOM: TOTAL
PERSONAL GROOMING: A LOT

## 2024-05-18 ASSESSMENT — PAIN SCALES - GENERAL: PAINLEVEL_OUTOF10: 0 - NO PAIN

## 2024-05-18 NOTE — NURSING NOTE
Report called to nurse Ortez at [facility]. Midline and IV removed by AMINAH Galindo.  Belongings gathered up. Waiting for transport to facility.

## 2024-05-18 NOTE — PROGRESS NOTES
[facility] Progress Note               Rounding Cardiologist:  Antonio Rodriges MD, MD   Primary Cardiologist: Dr. Antonio Rodriges    Date:  5/18/2024  Patient:  Bing Holliday  YOB: 1960  MRN:  [id number]   Admit Date:  5/7/2024      SUBJECTIVE    Bing Holliday was seen and examined today at bedside.  Status post pacemaker implanted yesterday  Device functioning well.  Device review with patient during this evaluation  Checks x-ray reviewed  Telemetry shows sinus rhythm    Laboratory today showed sodium 147, potassium 3.8.  BUN 25 creatinine 1.4.  Magnesium 1.69.  WBC 11.7.  Hemoglobin 9.9.  Hematocrit 32.0.  Platelet count 96.  EKG performed today shows atrial paced rhythm rate of 60 bpm QRS duration 104 ms QT corrected 160 ms.    VITALS     Vitals:    05/17/24 1818 05/17/24 1922 05/18/24 0357 05/18/24 0906   BP: 139/84 (!) 152/95 156/85 156/81   BP Location:  Right arm Right arm Right arm   Patient Position:  Lying Lying Lying   Pulse: 75 72 60 60   Resp:  16 16 16   Temp: 35.4 °C (95.7 °F) 35.4 °C (95.7 °F) 35 °C (95 °F) 35.6 °C (96.1 °F)   TempSrc:  Temporal Temporal Temporal   SpO2: 100% 94% 92% 97%   Weight:       Height:           Intake/Output Summary (Last 24 hours) at 5/18/2024 1126  Last data filed at 5/18/2024 0525  Gross per 24 hour   Intake 55 ml   Output 1000 ml   Net -945 ml       [unfilled]    PHYSICAL EXAM   Physical Exam  Constitutional:       Appearance: Normal appearance. She is obese.   HENT:      Head: Normocephalic and atraumatic.      Nose: Nose normal.      Mouth/Throat:      Mouth: Mucous membranes are moist.   Eyes:      Conjunctiva/sclera: Conjunctivae normal.   Cardiovascular:      Rate and Rhythm: Normal rate and regular rhythm.      Pulses: Normal pulses.      Heart sounds: Normal heart sounds.  Device in the left prepectoral healing well.  No signs of hematoma or infection.  Pulmonary:      Effort: Pulmonary effort is normal.      Breath sounds: Normal breath sounds.  "  Musculoskeletal:         General: Deformity present. Normal range of motion.      Right lower leg: Edema present.      Left lower leg: Edema present.   Skin:     General: Skin is warm and dry.   Neurological:      General: No focal deficit present.      Mental Status: She is alert and oriented to person, place, and time.   Psychiatric:         Mood and Affect: Mood normal.         Behavior: Behavior normal.       DIAGNOSTIC RESULTS   EKG:     Telemetry: Sinus rhythm.      LAB DATA   BMP:  @LABRCNT(Na:3,K:3,CL:3,CO2:3,Bun:3,Creatinine:3,Glu:3, CA:3,LABGLOM)@    Cardiac Enzymes:  @LABRCNT(CKTOTAL:3,CKMB:3,CKMBINDEX:3,TROPONINI:3)@    CBC:   Lab Results   Component Value Date    WBC 11.7 (H) 05/18/2024    RBC 3.27 (L) 05/18/2024    HGB 9.9 (L) 05/18/2024    HCT 32.0 (L) 05/18/2024    PLT 96 (L) 05/18/2024       CMP:    Lab Results   Component Value Date     (H) 05/18/2024    K 3.8 05/18/2024     05/18/2024    CO2 34 (H) 05/18/2024    BUN 25 (H) 05/18/2024    CREATININE 1.40 (H) 05/18/2024    GLUCOSE 111 (H) 05/18/2024    CALCIUM 7.8 (L) 05/18/2024       Hepatic Function Panel:  No results found for: \"ALKPHOS\", \"ALT\", \"AST\", \"PROT\", \"BILITOT\", \"BILIDIR\"    Magnesium:    Lab Results   Component Value Date    MG 1.69 05/18/2024       PT/INR:  No results found for: \"PROTIME\", \"INR\"  @LABRCNT(inr:3)@     HgBA1c:  No components found for: \"LABA1C\"    Lipid Profile:  No results found for: \"CHLPL\", \"TRIG\", \"HDL\", \"LDLCALC\", \"LDLDIRECT\"    TSH:  No results found for: \"TSH\"    ABG:  No results found for: \"PH\"    PRO-BNP: No results found for: \"PROBNP\"       RADIOLOGY     Cardiac device check - Inpatient   Final Result      XR chest 1 view   Final Result   1.  Interval placement of a left-sided dual lead cardiac pacing   device with leads overlying the right atrium and right ventricle   levels respectively. No pneumothorax.   2. Low inspiratory volume. Interstitial prominence with irregular   infiltrates in the " infrahilar regions in the lung bases may be due to   vascular congestion/edema. Infection not excluded.                  MACRO:   None.        Signed by: Hay Tucker 5/17/2024 8:18 AM   Dictation workstation:   [id number]      Electrophysiology procedure   Final Result      Bedside Midline Imaging   Final Result      MR brain w and wo IV contrast   Final Result   No evidence of acute infarct, intracranial mass effect or midline   shift. Multiple chronic findings as detailed.        Signed by: Emelia George 5/11/2024 6:44 PM   Dictation workstation:   [id number]      XR chest 1 view   Final Result   1. Left IJ central line with the tip overlying the region of the   right atrium. No radiographic evidence for pneumothorax.   2. Radiopaque tubing at the soft tissues of the right side of the   neck, may represent a peripherally inserted catheter. Clinical   correlation recommended.   3. Cardiomegaly. Vascular congestion. Bilateral airspace opacities,   may be secondary to pulmonary edema and/or pneumonia.                  MACRO:   None.        Signed by: Ceci Westbrook 5/10/2024 8:06 PM   Dictation workstation:   [id number]      Transthoracic Echo (TTE) Limited   Final Result      FL guided lumbar puncture   Final Result   Fluoroscopic guided lumbar puncture attempt. Only a very small volume   of slightly blood-tinged CSF was able to be obtained and then flow of   fluid stopped. The small volume of fluid obtained was submitted for   laboratory analysis.        MACRO:   None.        Signed by: Hay Tucker 5/10/2024 2:10 PM   Dictation workstation:   [id number]      CT head wo IV contrast   Final Result   No evidence of acute cortical infarct or intracranial hemorrhage.        Stable chronic changes.        MACRO:   None        Signed by: Higinio Sanchez 5/7/2024 5:43 PM   Dictation workstation:   [id number]      XR chest 1 view   Final Result   Diffuse interstitial infiltrates bilaterally, as above.  Clinical   correlation and continued follow-up until clearing is recommended.        MACRO:   None.        Signed by: Nic Moseley 5/7/2024 2:07 PM   Dictation workstation:   [id number]      XR chest 2 views    (Results Pending)   Cardiac Device Check - In Clinic    (Results Pending)   XR chest 2 views    (Results Pending)       [unfilled]      CURRENT MEDICATIONS    [Held by provider] apixaban, 2.5 mg, oral, q12h  atorvastatin, 40 mg, oral, Daily  atovaquone, 1,500 mg, oral, Daily  fludrocortisone, 0.1 mg, oral, Daily  tiotropium, 2 puff, inhalation, Daily   And  fluticasone furoate-vilanteroL, 1 puff, inhalation, Daily  folic acid, 1 mg, oral, Daily  heparin (porcine), 5,000 Units, subcutaneous, q8h  hydrocortisone, 10 mg, oral, Nightly  hydrocortisone, 20 mg, oral, Daily  insulin glargine, 10 Units, subcutaneous, q AM  insulin lispro, 0-15 Units, subcutaneous, TID  levETIRAcetam, 500 mg, oral, BID  magnesium oxide, 400 mg, oral, Daily  melatonin, 5 mg, oral, Nightly  metoprolol tartrate, 25 mg, oral, BID  multivitamin with minerals, 1 tablet, oral, Daily  mycophenolate, 1,000 mg, oral, BID  OLANZapine zydis, 5 mg, oral, Nightly  pantoprazole, 40 mg, oral, Daily before breakfast  thiamine, 100 mg, oral, Daily             ASSESSMENT   Principal Problem:    Disorientation  Active Problems:    Bradycardia, unspecified        Patient Active Problem List   Diagnosis    COVID-19    Lupus (Multi)    Ambulatory dysfunction    Myelodysplastic syndrome, unspecified (Multi)    Lupus vasculitis (Multi)    Hyperlipidemia    Pneumonia of both lower lobes due to infectious organism    Hallucinations    Leg swelling    Hyperglycemia    JED (acute kidney injury) (CMS-HCC)    Hypernatremia    Proliferative diabetic retinopathy of right eye without macular edema determined by examination associated with type 2 diabetes mellitus (Multi)    Urinary incontinence    Paraparesis (Multi)    Abdominal cramping    Abnormal echocardiogram     Abnormal gait    Abnormal thyroid blood test    Advanced COPD (Multi)    Afferent pupillary defect of right eye    Anemia    Pancytopenia (Multi)    Altitudinal scotoma of right eye    Arcuate scotoma of left eye    Arthropathy    Asymptomatic PVCs    PAF (paroxysmal atrial fibrillation) (Multi)    Balance problem    Bilateral high frequency sensorineural hearing loss    Bradycardia    Branch retinal artery occlusion    Cardiomyopathy (Multi)    Chronic diarrhea    Chronic fatigue    Chronic kidney disease (CKD), stage III (moderate) (Multi)    CKD stage G3a/A2, GFR 45-59 and albumin creatinine ratio  mg/g (Multi)    Combined forms of age-related cataract of left eye    Combined forms of age-related cataract of right eye    Contracture of hand    Snoring    CVA (cerebral vascular accident) (Multi)    Daytime somnolence    Degeneration of lumbar/lumbosacral disc without myelopathy    Depression, major    Dizziness    Dupuytren's contracture    Eczema    Elevated alkaline phosphatase level    Encephalopathy acute    Facial twitching    Fibromyalgia    Fungal nail infection    GERD (gastroesophageal reflux disease)    Gouty arthropathy    H/O iritis    H/O orthostatic hypotension    Hereditary elliptocytosis (CMS-HCC)    High level of cardiac marker    Hip hematoma, right    Hypothermia    Insomnia    Leg edema, left    Loss of consciousness (Multi)    Low blood sugar reading    Lung nodule, multiple    Lupus nephritis (Multi)    Metabolic encephalopathy    Muscle cramps    Nodule of skin of left lower leg    Obstructive lung disease (Multi)    Osteoarthritis of left hand    Osteoarthritis of right hand    Osteopenia    Osteoporosis    Palpitations    Peripheral visual field defect    Persistent proteinuria    Positive colorectal cancer screening using Cologuard test    Benign essential HTN    Psychosis (Multi)    Pulmonary hypertension (Multi)    Refractive error    Hypertensive retinopathy    Retinal  neovascularization    Secondary pulmonary arterial hypertension (Multi)    Shortness of breath on exertion    Spinal stenosis of lumbar region with neurogenic claudication    Subjective tinnitus of both ears    Swelling of right foot    Syncope and collapse    Thrombophlebitis of superficial veins of left lower extremity    Tinnitus of left ear    Vitamin D deficiency    DM type 2 with diabetic peripheral neuropathy (Multi)    Systemic lupus erythematosus (Multi)    Diabetic nephropathy (Multi)    Functional diarrhea    UTI (urinary tract infection)    Moderate protein-calorie malnutrition (Multi)    Shock, unspecified (Multi)    Hyperkalemia    Adrenal insufficiency (Multi)    Seizure-like activity (Multi)    Meningitis ([facility]-HCC)    Encephalopathy    Seizure (Multi)    Uncontrolled blood glucose    Cervical spondylosis with myelopathy    COPD (chronic obstructive pulmonary disease) (Multi)    Encounter for diabetes education    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)    Chronic kidney disease, stage 4 (severe) (Multi)    Ulcerative (chronic) pancolitis with rectal bleeding (Multi)    Anxiety    Cortical cataract    Hyperparathyroidism due to renal insufficiency (Multi)    Iron deficiency anemia due to chronic blood loss    Left ventricular hypertrophy    Nephrosclerosis    Obesity    Ocular migraine    Persistent cough    Proliferative diabetic retinopathy (Multi)    Raynaud's disease    Disorientation    Bradycardia, unspecified     Clinical impression:   1.  Marked sinus bradycardia with tachybradycardia  2.  Hypertension  3.  Lupus  4.  Chronic kidney disease  5.  Anemia  6.  History of coronary artery disease with coronary artery bypass graft surgery in 2013  7.  History of atrial fibrillation on Eliquis therapy  8.  History of adrenal insufficiency  9.  Cardiomyopathy with a left ventricular ejection fraction of 40% per echocardiogram in March/2024 - repeat echo  5/10/24 shows LVEF 40-45% with moderate LVH  10. Pulmonary artery HTN with severly elevated PAP (60.5 mmhg)   11.  Thrombocytopenia  12.  Status post dual-chamber pacemaker implanted May 17, 2024 with no complications      PLAN     Patient is doing well from the electrophysiology standpoint.  Pacemaker review  Checks x-ray reviewed  No evidence of hematoma on the device pocket  Continue with beta-blocker therapy  Continue with Eliquis therapy as indicated  Follow my office in 7 days for wound assessment.    Antonio Rodriges MD      Please do not hesitate to call with questions.  Electronically signed by Antonio Rodriges MD, FACC on 5/18/2024 at 11:26 AM

## 2024-05-18 NOTE — PROGRESS NOTES
05/18/24 1127   Current Planned Discharge Disposition   Current Planned Discharge Disposition Inter  ([place] [facility])     Pt medically cleared for discharge today. Facility notified and confirms they are able to accommodate pt today. Transport requested via roundtrip and confirms 1pm pickup. Care team and facility notified. SW notified pt, pt in agreement with discharge plan. Pt requests for SW to contact her son López to update, SW called but unable to reach López, VM left updating on plans for discharge back to facility this afternoon.     Update- SW notified pt of inability to reach son and VM left. Pt gave permission for [facility] to contact her daughter Sandra. SW contacted Sandra, unable to reach her, did leave a VM updating on discharge planned for today.

## 2024-05-18 NOTE — DISCHARGE SUMMARY
DISCHARGE DIAGNOSIS     Acute on chronic adrenal insufficiency  Acute metabolic encephalopathy  pAfib on OAC  Tachy-angelica sydnrome s/p PPM  HTN  Hypernatremia  CKD3  Chronic HFmrEF  Chronic bicytopenia    HOSPITAL COURSE AND DETAILS     Adrenal insufficiency - reportedly 2/2 chronic steroid use for lupus. Was initially in ICU due to encephalopathy, hypothermia, electrolyte abnormalities. Improved with IV steroid, PO fludrocortisone. Now transitioned to PO hydrocortisone 20 in AM/10 in PM, has been stable on this on the floor. Cont PO fludrocortisone every other day (reduced from daily d/t HTN). Cont atovaquone for pjp ppx (sulfa allergy), ppi for gi ppx. Endocrinology saw here. She had ACTH stim test done here to confirm diagnosis, albeit after already being on steroids, was nondiagnostic but not sure if accurate. Fu with endocrine as outpt.      Hx of Afib on OAC, bradycardia, sinus node dysfunction - EP saw here. Now s/p PPM on 5/17/24. Started on Lopressor after PPM, stable. Resume PTA Eliquis on 5/20.     Acute metabolic encephalopathy - likely 2/2 adrenal insufficiency, was followed by neuro, extensive lawrence was done in ICU and all negative. Now resolved and A+Ox4.      HTN - has been labile here, is running higher likely from steroids and florinef. Better controlled with metoprolol. Florinef will be every other day.      Hypernatremia - chronic, seems to always be slightly elevated. Encourage PO water intake.      CKD3 - Scr at baseline      SLE - cont pta cellcept, pta steroids changed to hydrocortisone for now as above     COPD - no pfts on file, not in acute exacerbation, cont home inhalers     CAD s/p CABG (2013), DLD - cont statin, unclear why taken off aspirin, not mentioned in recent cards note, possibly 2/2 thrombocytopenia. New start metoprolol here. Cont outpt cardiology fu.      HFmrEF - TTE from 5/2024 showed LVEF 40-45%. Currently compensated. Has been unable to tolerate GDMT in the past due to  allergies and co-morbidities. New start beta blocker after PPM. Outpt fu with cardiology.      Hx of psychosis with visual hallucinations - cont Zyprexa nightly     Focal seizures - home Keppra     Leukocytosis - steroid induced     Chronic anemia, thrombocytopenia - stable, no active bleeding, s/p 1u pRBC and 1u plt this admission for optimization prior to ppm. At baseline on discharge.      Stable for dc back to SNF for LTC. Total time spent on discharge services 40 minutes.     DISCHARGE PHYSICAL EXAM     Last Recorded Vitals:  Vitals:    05/17/24 1818 05/17/24 1922 05/18/24 0357 05/18/24 0906   BP: 139/84 (!) 152/95 156/85 156/81   BP Location:  Right arm Right arm Right arm   Patient Position:  Lying Lying Lying   Pulse: 75 72 60 60   Resp:  16 16 16   Temp: 35.4 °C (95.7 °F) 35.4 °C (95.7 °F) 35 °C (95 °F) 35.6 °C (96.1 °F)   TempSrc:  Temporal Temporal Temporal   SpO2: 100% 94% 92% 97%   Weight:       Height:           Physical Exam:  GEN: appears stated age, NAD  CV: RRR, no m/r/g, no LE edema  LUNGS: CTAB  ABD: soft, NT  NEURO: A+Ox4, no FND  PSYCH: appropriate mood, affect      PERTINENT LABS AND IMAGING     Results for orders placed or performed during the hospital encounter of 05/07/24 (from the past 96 hour(s))   Basic Metabolic Panel   Result Value Ref Range    Glucose 208 (H) 74 - 99 mg/dL    Sodium 148 (H) 136 - 145 mmol/L    Potassium 3.8 3.5 - 5.3 mmol/L    Chloride 110 (H) 98 - 107 mmol/L    Bicarbonate 32 21 - 32 mmol/L    Anion Gap 10 10 - 20 mmol/L    Urea Nitrogen 22 6 - 23 mg/dL    Creatinine 1.66 (H) 0.50 - 1.05 mg/dL    eGFR 35 (L) >60 mL/min/1.73m*2    Calcium 8.1 (L) 8.6 - 10.3 mg/dL   POCT GLUCOSE   Result Value Ref Range    POCT Glucose 199 (H) 74 - 99 mg/dL   CBC and Auto Differential   Result Value Ref Range    WBC 13.4 (H) 4.4 - 11.3 x10*3/uL    nRBC 3.8 (H) 0.0 - 0.0 /100 WBCs    RBC 2.61 (L) 4.00 - 5.20 x10*6/uL    Hemoglobin 7.9 (L) 12.0 - 16.0 g/dL    Hematocrit 25.6 (L) 36.0 -  46.0 %    MCV 98 80 - 100 fL    MCH 30.3 26.0 - 34.0 pg    MCHC 30.9 (L) 32.0 - 36.0 g/dL    RDW 18.1 (H) 11.5 - 14.5 %    Platelets 84 (L) 150 - 450 x10*3/uL    Immature Granulocytes %, Automated 11.8 (H) 0.0 - 0.9 %    Immature Granulocytes Absolute, Automated 1.59 (H) 0.00 - 0.70 x10*3/uL   Basic Metabolic Panel   Result Value Ref Range    Glucose 336 (H) 74 - 99 mg/dL    Sodium 149 (H) 136 - 145 mmol/L    Potassium 3.4 (L) 3.5 - 5.3 mmol/L    Chloride 110 (H) 98 - 107 mmol/L    Bicarbonate 34 (H) 21 - 32 mmol/L    Anion Gap 8 (L) 10 - 20 mmol/L    Urea Nitrogen 22 6 - 23 mg/dL    Creatinine 1.47 (H) 0.50 - 1.05 mg/dL    eGFR 40 (L) >60 mL/min/1.73m*2    Calcium 7.8 (L) 8.6 - 10.3 mg/dL   Magnesium   Result Value Ref Range    Magnesium 1.70 1.60 - 2.40 mg/dL   Manual Differential   Result Value Ref Range    Neutrophils %, Manual 88.0 40.0 - 80.0 %    Bands %, Manual 5.0 0.0 - 5.0 %    Lymphocytes %, Manual 1.0 13.0 - 44.0 %    Monocytes %, Manual 2.0 2.0 - 10.0 %    Eosinophils %, Manual 0.0 0.0 - 6.0 %    Basophils %, Manual 0.0 0.0 - 2.0 %    Metamyelocytes %, Manual 1.0 0.0 - 0.0 %    Myelocytes %, Manual 3.0 0.0 - 0.0 %    Seg Neutrophils Absolute, Manual 11.79 (H) 1.20 - 7.00 x10*3/uL    Bands Absolute, Manual 0.67 0.00 - 0.70 x10*3/uL    Lymphocytes Absolute, Manual 0.13 (L) 1.20 - 4.80 x10*3/uL    Monocytes Absolute, Manual 0.27 0.10 - 1.00 x10*3/uL    Eosinophils Absolute, Manual 0.00 0.00 - 0.70 x10*3/uL    Basophils Absolute, Manual 0.00 0.00 - 0.10 x10*3/uL    Metamyelocytes Absolute, Manual 0.13 0.00 - 0.00 x10*3/uL    Myelocytes Absolute, Manual 0.40 0.00 - 0.00 x10*3/uL    Total Cells Counted 100     Neutrophils Absolute, Manual 12.46 (H) 1.20 - 7.70 x10*3/uL    RBC Morphology See Below     Polychromasia Mild     Ovalocytes Few     Teardrop Cells Few     Katarzyna Cells Few     Basophilic Stippling Present    ECG 12 Lead   Result Value Ref Range    Ventricular Rate 54 BPM    Atrial Rate 54 BPM    NJ  Interval 132 ms    QRS Duration 106 ms    QT Interval 454 ms    QTC Calculation(Bazett) 430 ms    P Axis 54 degrees    R Axis -13 degrees    T Axis -26 degrees    QRS Count 8 beats    Q Onset 215 ms    P Onset 149 ms    P Offset 205 ms    T Offset 442 ms    QTC Fredericia 438 ms   POCT GLUCOSE   Result Value Ref Range    POCT Glucose 260 (H) 74 - 99 mg/dL   POCT GLUCOSE   Result Value Ref Range    POCT Glucose 207 (H) 74 - 99 mg/dL   Basic Metabolic Panel   Result Value Ref Range    Glucose 121 (H) 74 - 99 mg/dL    Sodium 148 (H) 136 - 145 mmol/L    Potassium 3.2 (L) 3.5 - 5.3 mmol/L    Chloride 109 (H) 98 - 107 mmol/L    Bicarbonate 33 (H) 21 - 32 mmol/L    Anion Gap 9 (L) 10 - 20 mmol/L    Urea Nitrogen 21 6 - 23 mg/dL    Creatinine 1.44 (H) 0.50 - 1.05 mg/dL    eGFR 41 (L) >60 mL/min/1.73m*2    Calcium 7.8 (L) 8.6 - 10.3 mg/dL   POCT GLUCOSE   Result Value Ref Range    POCT Glucose 122 (H) 74 - 99 mg/dL   POCT GLUCOSE   Result Value Ref Range    POCT Glucose 205 (H) 74 - 99 mg/dL   ECG 12 Lead   Result Value Ref Range    Ventricular Rate 47 BPM    Atrial Rate 47 BPM    NM Interval 138 ms    QRS Duration 92 ms    QT Interval 476 ms    QTC Calculation(Bazett) 421 ms    P Axis 57 degrees    R Axis -15 degrees    T Axis -34 degrees    QRS Count 8 beats    Q Onset 215 ms    P Onset 146 ms    P Offset 204 ms    T Offset 453 ms    QTC Fredericia 438 ms   CBC and Auto Differential   Result Value Ref Range    WBC 11.9 (H) 4.4 - 11.3 x10*3/uL    nRBC 3.0 (H) 0.0 - 0.0 /100 WBCs    RBC 2.63 (L) 4.00 - 5.20 x10*6/uL    Hemoglobin 8.0 (L) 12.0 - 16.0 g/dL    Hematocrit 26.4 (L) 36.0 - 46.0 %     80 - 100 fL    MCH 30.4 26.0 - 34.0 pg    MCHC 30.3 (L) 32.0 - 36.0 g/dL    RDW 18.3 (H) 11.5 - 14.5 %    Platelets 79 (L) 150 - 450 x10*3/uL    Immature Granulocytes %, Automated 9.7 (H) 0.0 - 0.9 %    Immature Granulocytes Absolute, Automated 1.16 (H) 0.00 - 0.70 x10*3/uL   Basic Metabolic Panel   Result Value Ref Range     Glucose 259 (H) 74 - 99 mg/dL    Sodium 147 (H) 136 - 145 mmol/L    Potassium 3.7 3.5 - 5.3 mmol/L    Chloride 107 98 - 107 mmol/L    Bicarbonate 36 (H) 21 - 32 mmol/L    Anion Gap 8 (L) 10 - 20 mmol/L    Urea Nitrogen 19 6 - 23 mg/dL    Creatinine 1.38 (H) 0.50 - 1.05 mg/dL    eGFR 43 (L) >60 mL/min/1.73m*2    Calcium 7.8 (L) 8.6 - 10.3 mg/dL   Magnesium   Result Value Ref Range    Magnesium 1.70 1.60 - 2.40 mg/dL   Manual Differential   Result Value Ref Range    Neutrophils %, Manual 84.0 40.0 - 80.0 %    Bands %, Manual 8.0 0.0 - 5.0 %    Lymphocytes %, Manual 6.0 13.0 - 44.0 %    Monocytes %, Manual 2.0 2.0 - 10.0 %    Eosinophils %, Manual 0.0 0.0 - 6.0 %    Basophils %, Manual 0.0 0.0 - 2.0 %    Seg Neutrophils Absolute, Manual 10.00 (H) 1.20 - 7.00 x10*3/uL    Bands Absolute, Manual 0.95 (H) 0.00 - 0.70 x10*3/uL    Lymphocytes Absolute, Manual 0.71 (L) 1.20 - 4.80 x10*3/uL    Monocytes Absolute, Manual 0.24 0.10 - 1.00 x10*3/uL    Eosinophils Absolute, Manual 0.00 0.00 - 0.70 x10*3/uL    Basophils Absolute, Manual 0.00 0.00 - 0.10 x10*3/uL    Total Cells Counted 100     Neutrophils Absolute, Manual 10.95 (H) 1.20 - 7.70 x10*3/uL    RBC Morphology See Below     Polychromasia Mild     Ovalocytes Few    ECG 12 Lead   Result Value Ref Range    Ventricular Rate 44 BPM    Atrial Rate 44 BPM    FL Interval 134 ms    QRS Duration 104 ms    QT Interval 502 ms    QTC Calculation(Bazett) 429 ms    P Axis 66 degrees    R Axis -10 degrees    T Axis -18 degrees    QRS Count 8 beats    Q Onset 217 ms    P Onset 150 ms    P Offset 209 ms    T Offset 468 ms    QTC Fredericia 452 ms   POCT GLUCOSE   Result Value Ref Range    POCT Glucose 221 (H) 74 - 99 mg/dL   POCT GLUCOSE   Result Value Ref Range    POCT Glucose 286 (H) 74 - 99 mg/dL   Prepare RBC: 1 Units   Result Value Ref Range    PRODUCT CODE V6931E02     Unit Number [id number]     Unit ABO O     Unit RH POS     XM INTEP COMP     Dispense Status TR     Blood  Expiration Date June 01, 2024 23:59 EDT     PRODUCT BLOOD TYPE 5100     UNIT VOLUME 350    Prepare Platelets: 1 Units   Result Value Ref Range    PRODUCT CODE Z6233K76     Unit Number [id number]     Unit ABO A     Unit RH POS     Dispense Status IS     Blood Expiration Date May 18, 2024 23:59 EDT     PRODUCT BLOOD TYPE 6200     UNIT VOLUME 223    Cortisol Baseline   Result Value Ref Range    Baseline Cortisol 29.4 ug/dL   Type and screen   Result Value Ref Range    ABO TYPE O     Rh TYPE POS     ANTIBODY SCREEN NEG    POCT GLUCOSE   Result Value Ref Range    POCT Glucose 150 (H) 74 - 99 mg/dL   Cortisol 30 minute   Result Value Ref Range    30 minute Cortisol  27.7 ug/dL   Cortisol 60 minute   Result Value Ref Range    60 minute Cortisol  28.7 ug/dL   POCT GLUCOSE   Result Value Ref Range    POCT Glucose 173 (H) 74 - 99 mg/dL   CBC   Result Value Ref Range    WBC 11.3 4.4 - 11.3 x10*3/uL    nRBC 4.5 (H) 0.0 - 0.0 /100 WBCs    RBC 3.51 (L) 4.00 - 5.20 x10*6/uL    Hemoglobin 10.6 (L) 12.0 - 16.0 g/dL    Hematocrit 34.0 (L) 36.0 - 46.0 %    MCV 97 80 - 100 fL    MCH 30.2 26.0 - 34.0 pg    MCHC 31.2 (L) 32.0 - 36.0 g/dL    RDW 19.2 (H) 11.5 - 14.5 %    Platelets 76 (L) 150 - 450 x10*3/uL   POCT GLUCOSE   Result Value Ref Range    POCT Glucose 217 (H) 74 - 99 mg/dL   ECG 12 lead STAT   Result Value Ref Range    Ventricular Rate 61 BPM    Atrial Rate 61 BPM    NC Interval 188 ms    QRS Duration 92 ms    QT Interval 450 ms    QTC Calculation(Bazett) 453 ms    P Axis 52 degrees    R Axis -12 degrees    T Axis -49 degrees    QRS Count 10 beats    Q Onset 215 ms    P Onset 128 ms    P Offset 195 ms    T Offset 440 ms    QTC Fredericia 452 ms   CBC and Auto Differential   Result Value Ref Range    WBC 11.6 (H) 4.4 - 11.3 x10*3/uL    nRBC 2.7 (H) 0.0 - 0.0 /100 WBCs    RBC 3.70 (L) 4.00 - 5.20 x10*6/uL    Hemoglobin 11.0 (L) 12.0 - 16.0 g/dL    Hematocrit 35.7 (L) 36.0 - 46.0 %    MCV 97 80 - 100 fL    MCH 29.7 26.0 - 34.0  pg    MCHC 30.8 (L) 32.0 - 36.0 g/dL    RDW 19.9 (H) 11.5 - 14.5 %    Platelets 104 (L) 150 - 450 x10*3/uL    Immature Granulocytes %, Automated 6.6 (H) 0.0 - 0.9 %    Immature Granulocytes Absolute, Automated 0.76 (H) 0.00 - 0.70 x10*3/uL   Basic Metabolic Panel   Result Value Ref Range    Glucose 213 (H) 74 - 99 mg/dL    Sodium 147 (H) 136 - 145 mmol/L    Potassium 3.6 3.5 - 5.3 mmol/L    Chloride 107 98 - 107 mmol/L    Bicarbonate 36 (H) 21 - 32 mmol/L    Anion Gap 8 (L) 10 - 20 mmol/L    Urea Nitrogen 20 6 - 23 mg/dL    Creatinine 1.32 (H) 0.50 - 1.05 mg/dL    eGFR 46 (L) >60 mL/min/1.73m*2    Calcium 7.9 (L) 8.6 - 10.3 mg/dL   Magnesium   Result Value Ref Range    Magnesium 1.73 1.60 - 2.40 mg/dL   SST TOP   Result Value Ref Range    Extra Tube Hold for add-ons.    Manual Differential   Result Value Ref Range    Neutrophils %, Manual 86.0 40.0 - 80.0 %    Bands %, Manual 5.0 0.0 - 5.0 %    Lymphocytes %, Manual 6.0 13.0 - 44.0 %    Monocytes %, Manual 3.0 2.0 - 10.0 %    Eosinophils %, Manual 0.0 0.0 - 6.0 %    Basophils %, Manual 0.0 0.0 - 2.0 %    Seg Neutrophils Absolute, Manual 9.98 (H) 1.20 - 7.00 x10*3/uL    Bands Absolute, Manual 0.58 0.00 - 0.70 x10*3/uL    Lymphocytes Absolute, Manual 0.70 (L) 1.20 - 4.80 x10*3/uL    Monocytes Absolute, Manual 0.35 0.10 - 1.00 x10*3/uL    Eosinophils Absolute, Manual 0.00 0.00 - 0.70 x10*3/uL    Basophils Absolute, Manual 0.00 0.00 - 0.10 x10*3/uL    Total Cells Counted 100     Neutrophils Absolute, Manual 10.56 (H) 1.20 - 7.70 x10*3/uL    RBC Morphology See Below     Polychromasia Mild     Ovalocytes Few     [place] Cells Few    POCT GLUCOSE   Result Value Ref Range    POCT Glucose 237 (H) 74 - 99 mg/dL   POCT GLUCOSE   Result Value Ref Range    POCT Glucose 181 (H) 74 - 99 mg/dL   POCT GLUCOSE   Result Value Ref Range    POCT Glucose 208 (H) 74 - 99 mg/dL   SST TOP   Result Value Ref Range    Extra Tube Hold for add-ons.    CBC and Auto Differential   Result Value Ref  Range    WBC 11.7 (H) 4.4 - 11.3 x10*3/uL    nRBC 3.1 (H) 0.0 - 0.0 /100 WBCs    RBC 3.27 (L) 4.00 - 5.20 x10*6/uL    Hemoglobin 9.9 (L) 12.0 - 16.0 g/dL    Hematocrit 32.0 (L) 36.0 - 46.0 %    MCV 98 80 - 100 fL    MCH 30.3 26.0 - 34.0 pg    MCHC 30.9 (L) 32.0 - 36.0 g/dL    RDW 19.9 (H) 11.5 - 14.5 %    Platelets 96 (L) 150 - 450 x10*3/uL    Neutrophils % 77.1 40.0 - 80.0 %    Immature Granulocytes %, Automated 3.8 (H) 0.0 - 0.9 %    Lymphocytes % 12.7 13.0 - 44.0 %    Monocytes % 5.8 2.0 - 10.0 %    Eosinophils % 0.3 0.0 - 6.0 %    Basophils % 0.3 0.0 - 2.0 %    Neutrophils Absolute 9.04 (H) 1.20 - 7.70 x10*3/uL    Immature Granulocytes Absolute, Automated 0.44 0.00 - 0.70 x10*3/uL    Lymphocytes Absolute 1.49 1.20 - 4.80 x10*3/uL    Monocytes Absolute 0.68 0.10 - 1.00 x10*3/uL    Eosinophils Absolute 0.03 0.00 - 0.70 x10*3/uL    Basophils Absolute 0.03 0.00 - 0.10 x10*3/uL   Basic Metabolic Panel   Result Value Ref Range    Glucose 111 (H) 74 - 99 mg/dL    Sodium 147 (H) 136 - 145 mmol/L    Potassium 3.8 3.5 - 5.3 mmol/L    Chloride 106 98 - 107 mmol/L    Bicarbonate 34 (H) 21 - 32 mmol/L    Anion Gap 11 10 - 20 mmol/L    Urea Nitrogen 25 (H) 6 - 23 mg/dL    Creatinine 1.40 (H) 0.50 - 1.05 mg/dL    eGFR 43 (L) >60 mL/min/1.73m*2    Calcium 7.8 (L) 8.6 - 10.3 mg/dL   Magnesium   Result Value Ref Range    Magnesium 1.69 1.60 - 2.40 mg/dL   ECG 12 Lead   Result Value Ref Range    Ventricular Rate 60 BPM    Atrial Rate 60 BPM    NH Interval 200 ms    QRS Duration 104 ms    QT Interval 460 ms    QTC Calculation(Bazett) 460 ms    P Axis 32 degrees    R Axis -15 degrees    T Axis -54 degrees    QRS Count 10 beats    Q Onset 215 ms    P Onset 123 ms    P Offset 197 ms    T Offset 445 ms    QTC Fredericia 460 ms   POCT GLUCOSE   Result Value Ref Range    POCT Glucose 123 (H) 74 - 99 mg/dL   POCT GLUCOSE   Result Value Ref Range    POCT Glucose 224 (H) 74 - 99 mg/dL        Cardiac device check - Inpatient   Final Result       XR chest 1 view   Final Result   1.  Interval placement of a left-sided dual lead cardiac pacing   device with leads overlying the right atrium and right ventricle   levels respectively. No pneumothorax.   2. Low inspiratory volume. Interstitial prominence with irregular   infiltrates in the infrahilar regions in the lung bases may be due to   vascular congestion/edema. Infection not excluded.                  MACRO:   None.        Signed by: Hay Tucker 5/17/2024 8:18 AM   Dictation workstation:   [id number]      Electrophysiology procedure   Final Result      Bedside Midline Imaging   Final Result      MR brain w and wo IV contrast   Final Result   No evidence of acute infarct, intracranial mass effect or midline   shift. Multiple chronic findings as detailed.        Signed by: Emelia George 5/11/2024 6:44 PM   Dictation workstation:   [id number]      XR chest 1 view   Final Result   1. Left IJ central line with the tip overlying the region of the   right atrium. No radiographic evidence for pneumothorax.   2. Radiopaque tubing at the soft tissues of the right side of the   neck, may represent a peripherally inserted catheter. Clinical   correlation recommended.   3. Cardiomegaly. Vascular congestion. Bilateral airspace opacities,   may be secondary to pulmonary edema and/or pneumonia.                  MACRO:   None.        Signed by: Ceci Westbrook 5/10/2024 8:06 PM   Dictation workstation:   [id number]      Transthoracic Echo (TTE) Limited   Final Result      FL guided lumbar puncture   Final Result   Fluoroscopic guided lumbar puncture attempt. Only a very small volume   of slightly blood-tinged CSF was able to be obtained and then flow of   fluid stopped. The small volume of fluid obtained was submitted for   laboratory analysis.        MACRO:   None.        Signed by: Hay Tucker 5/10/2024 2:10 PM   Dictation workstation:   [id number]      CT head wo IV contrast   Final Result   No  evidence of acute cortical infarct or intracranial hemorrhage.        Stable chronic changes.        MACRO:   None        Signed by: Higinio Sanchez 5/7/2024 5:43 PM   Dictation workstation:   [id number]      XR chest 1 view   Final Result   Diffuse interstitial infiltrates bilaterally, as above. Clinical   correlation and continued follow-up until clearing is recommended.        MACRO:   None.        Signed by: Nic Moseley 5/7/2024 2:07 PM   Dictation workstation:   [id number]      XR chest 2 views    (Results Pending)   Cardiac Device Check - In Clinic    (Results Pending)   XR chest 2 views    (Results Pending)       Transthoracic Echo (TTE) Limited    Result Date: 5/10/2024          [place] [id number]  Tel [phone] Fax [phone] TRANSTHORACIC ECHOCARDIOGRAM REPORT  Patient Name:      LISBET ZARAGOZA NENITA Longoria Physician:    [id number] Bacilio Sommers DO Study Date:        5/10/2024             Ordering Provider:    [id number] SAMANTHA ANGELES MRN/PID:           [id number]              Fellow: Accession#:        [id number]          Nurse: Date of Birth/Age: 1960 / 63 years Sonographer:          Shahana Osullivan RDCS Gender:            F                     Additional Staff: Height:            165.10 cm             Admit Date:           5/7/2024 Weight:            102.06 kg             Admission Status:     Inpatient -                                                                Routine BSA / BMI:         2.08 m2 / 37.44 kg/m2 Department Location:  [facility] Blood Pressure: 167 /53 mmHg Study Type:    TRANSTHORACIC ECHO (TTE) LIMITED Diagnosis/ICD: Bradycardia, unspecified-R00.1 Indication:    Abnormal EKG CPT  Codes:     Echo Limited-10050; Color Doppler-17212; Doppler Limited-61592 Patient History: Pertinent History: HTN, Hyperlipidemia, A-Fib, Cardiomyopathy and Lupus. Study Detail: The following Echo studies were performed: 2D, M-Mode, Doppler and               color flow. Technically challenging study due to patient lying in               supine position and the patient's lack of cooperation.  PHYSICIAN INTERPRETATION: Left Ventricle: Left ventricular systolic function is mildly to moderately decreased, with an estimated ejection fraction of 40-45%. There is global hypokinesis of the left ventricle with minor regional variations. The left ventricular cavity size is normal. There is moderate concentric left ventricular hypertrophy. Left ventricular diastolic filling was not assessed. Left Atrium: The left atrium is upper limits of normal in size. Right Ventricle: The right ventricle is normal in size. There is normal right ventricular global systolic function. Right Atrium: The right atrium is normal in size. Aortic Valve: The aortic valve appears structurally normal. The aortic valve appears tricuspid. There is no evidence of aortic valve stenosis. There is no evidence of aortic valve regurgitation. Mitral Valve: The mitral valve is normal in structure. There is no evidence of mitral valve stenosis. There is normal mitral valve leaflet mobility. There is mild to moderate mitral valve regurgitation. Tricuspid Valve: The tricuspid valve is structurally normal. There is normal tricuspid valve leaflet mobility. There is mild tricuspid regurgitation. Pulmonic Valve: The pulmonic valve is structurally normal. There is no indication of pulmonic valve regurgitation. Pericardium: There is no pericardial effusion noted. Aorta: The aortic root is normal. Pulmonary Artery: The pulmonary artery is not well visualized. Pulmonary hypertension is present. The tricuspid regurgitant velocity is 3.79 m/s, and with an estimated right  atrial pressure of 3 mmHg, the estimated pulmonary artery pressure is severely elevated with the RVSP at 60.5 mmHg. Systemic Veins: The inferior vena cava was not well visualized. In comparison to the previous echocardiogram(s): Prior examinations are available and were reviewed for comparison purposes. The left ventricular function is unchanged. The left ventricular hypertrophy is worse.  CONCLUSIONS:  1. Left ventricular systolic function is mildly to moderately decreased with a 40-45% estimated ejection fraction.  2. There is moderate concentric left ventricular hypertrophy.  3. There is no evidence of mitral valve stenosis.  4. Mild to moderate mitral valve regurgitation.  5. Mild tricuspid regurgitation is visualized.  6. Aortic valve stenosis is not present.  7. Pulmonary hypertension is present.  8. Severely elevated pulmonary artery pressure.  9. The pulmonary artery is not well visualized. 10. There is global hypokinesis of the left ventricle with minor regional variations. RECOMMENDATIONS: Technically suboptimal and limited study, therefore accuracy of above interpretation could be substantially diminished. Clinical correlation is advised. Consider additional imaging modalities if clinically indicated.  QUANTITATIVE DATA SUMMARY: 2D MEASUREMENTS:                           Normal Ranges: LAs:           3.90 cm    (2.7-4.0cm) IVSd:          1.32 cm    (0.6-1.1cm) LVPWd:         1.37 cm    (0.6-1.1cm) LVIDd:         4.81 cm    (3.9-5.9cm) LVIDs:         3.87 cm LV Mass Index: 124.5 g/m2 LV % FS        19.5 % LV SYSTOLIC FUNCTION BY 2D PLANIMETRY (MOD):                     Normal Ranges: EF-A4C View: 39.2 % (>=55%) EF-A2C View: 42.1 % EF-Biplane:  40.8 % TRICUSPID VALVE/RVSP:                             Normal Ranges: Peak TR Velocity: 3.79 m/s RV Syst Pressure: 60.5 mmHg (< 30mmHg)  [id number] Bacilio Sommers DO Electronically signed on 5/10/2024 at 10:57:55 PM  ** Final **      DISCHARGE MEDICATIONS        Your  medication list        START taking these medications        Instructions Last Dose Given Next Dose Due   atovaquone 750 mg/5 mL suspension  Commonly known as: Mepron  Start taking on: May 19, 2024      Take 10 mL (1,500 mg) by mouth once daily.       fludrocortisone 0.1 mg tablet  Commonly known as: Florinef      Take 1 tablet (0.1 mg) by mouth every other day.       hydrocortisone 10 mg tablet  Commonly known as: Cortef      Take 1 tablet (10 mg) by mouth once daily at bedtime.       hydrocortisone 20 mg tablet  Commonly known as: Cortef  Start taking on: May 19, 2024      Take 1 tablet (20 mg) by mouth once daily.       metoprolol tartrate 25 mg tablet  Commonly known as: Lopressor      Take 1 tablet (25 mg) by mouth 2 times a day.              CHANGE how you take these medications        Instructions Last Dose Given Next Dose Due   apixaban 2.5 mg tablet  Commonly known as: Eliquis      Take 1 tablet (2.5 mg) by mouth 2 times a day.       atorvastatin 40 mg tablet  Commonly known as: Lipitor  What changed: when to take this      Take 1 tablet (40 mg) by mouth once daily.       OLANZapine 5 mg tablet  Commonly known as: ZyPREXA  What changed: when to take this      Take 1 tablet (5 mg) by mouth once daily at bedtime.              CONTINUE taking these medications        Instructions Last Dose Given Next Dose Due   acetaminophen 325 mg tablet  Commonly known as: Tylenol           albuterol 90 mcg/actuation inhaler           BOOST GLUCOSE CONTROL ORAL           Dexcom G6  misc  Generic drug: blood-glucose meter,continuous      Use as instructed       Dexcom G6 Sensor device  Generic drug: blood-glucose sensor      Use to check sugars 3 times daily       Dexcom G6 Transmitter device  Generic drug: blood-glucose transmitter device      Use as instructed       folic acid 1 mg tablet  Commonly known as: Folvite      TAKE 1 TABLET BY MOUTH EVERY DAY       Glucagon (HCl) Emergency Kit 1 mg recon soln  Generic  "drug: glucagon HCL           insulin lispro 100 unit/mL injection  Commonly known as: HumaLOG           Lantus U-100 Insulin 100 unit/mL injection  Generic drug: insulin glargine           levETIRAcetam 500 mg tablet  Commonly known as: Keppra      Take 1 tablet (500 mg) by mouth every 12 hours.       magnesium oxide 400 mg (241.3 mg magnesium) tablet  Commonly known as: Mag-Ox      Take 2 tablets (800 mg) by mouth once daily. Do not fill before May 1, 2024.       meclizine 25 mg tablet  Commonly known as: Antivert           melatonin 5 mg tablet           Mucinex 600 mg 12 hr tablet  Generic drug: guaiFENesin           multivitamin with minerals tablet           mycophenolate 500 mg tablet  Commonly known as: Cellcept      TAKE 2 TABLETS BY MOUTH TWICE A DAY       pantoprazole 40 mg EC tablet  Commonly known as: ProtoNix      TAKE 1 TABLET BY MOUTH EVERY DAY       pen needle, diabetic 31 gauge x 5/16\" needle      Use to inject 1-4 times daily as directed.       thiamine 100 mg tablet  Commonly known as: Vitamin B-1           Trelegy Ellipta 200-62.5-25 mcg blister with device  Generic drug: fluticasone-umeclidin-vilanter      Inhale 1 puff once daily.       Venelex ointment  Generic drug: balsam peru-castor oiL                  STOP taking these medications      predniSONE 5 mg tablet  Commonly known as: Deltasone                  Where to Get Your Medications        Information about where to get these medications is not yet available    Ask your nurse or doctor about these medications  atovaquone 750 mg/5 mL suspension  fludrocortisone 0.1 mg tablet  hydrocortisone 10 mg tablet  hydrocortisone 20 mg tablet  metoprolol tartrate 25 mg tablet  OLANZapine 5 mg tablet         OUTPATIENT FOLLOW-UP     Future Appointments   Date Time Provider Department Center   5/24/2024  1:00 PM [facility] RN 1 [id number] [place]   8/13/2024 10:30 AM Michael Arenas MD [id number] [place]   8/21/2024  1:20 PM [facility] [address] [place] [id number] [address] [address]   8/21/2024  2:15 PM Antonio Rodriges MD [id number] [place]

## 2024-05-18 NOTE — CARE PLAN
The patient's goals for the shift include      The clinical goals for the shift include Pt will remain hemodynamically stable throughout shift    Over the shift, the patient did not make progress toward the following goals. Barriers to progression include s/p PPM, comorbidites. Recommendations to address these barriers include daily labs, EKG  telemetry monitor.

## 2024-05-18 NOTE — PROGRESS NOTES
Speech-Language Pathology    SLP Adult Inpatient  Speech-Language Pathology Treatment     Patient Name: Bing Holliday  MRN: [id number]  Today's Date: 5/18/2024  Time Calculation  Start Time: 0808  Stop Time: 0822  Time Calculation (min): 14 min         Current Problem:   1. Disorientation  EEG      2. Acute pneumonia        3. Acute exacerbation of chronic obstructive pulmonary disease (COPD) (Multi)        4. Hypothermia, initial encounter        5. Shock, unspecified (Multi)  Transthoracic Echo (TTE) Limited    Transthoracic Echo (TTE) Limited      6. Bradycardia, unspecified  Transthoracic Echo (TTE) Limited    Case Request EP Lab: PPM IMPLANT DUAL    Case Request EP Lab: PPM IMPLANT DUAL    Electrophysiology procedure    Electrophysiology procedure    Cardiac device check - Inpatient    Cardiac device check - Inpatient      7. Pacemaker  Cardiac Device Check - In Clinic    XR chest 2 views            SLP Assessment:  Pt seen in pt's room at bedside. Pt demonstrated short term recall skills during formal tx activity for retention of paragraphs with 70% accuracy given occ cues. Pt instructed in use of visualization technique, pairing, auditory rehearsal strategy, and write it down strategy in order to facilitate pt short term recall skills. Pt reported understanding. Pt demonstrated safety/judgement skills during formal tx activity activity related to everyday problems with 90% accuracy.     PLAN:  SLP Plan: Skilled SLP  SLP TX Plan: Continue Plan of Care  SLP Frequency: 3x/wk  Discussed POC: Patient/Nurse  Discussed Risks/Benefits: Patient/Nurse  Patient/Caregiver Agreeable: Yes  Inpatient/Swing Bed or Outpatient: Inpatient    Goals:   Established 5/14/24  Pt will complete short term memory tasks related to current situation with 75% accuracy.   Progress: Pt demonstrates short term recall skills during formal tx activity with 70% accuracy.  Plan: Continue  Pt will make appropriate safety judgments when given a  relatable scenario with 75% accuracy.  Progress: Pt demonstrates ability to complete safety/judgement tasks with 90% accuracy.  Plan: Continue to establish maintenance of goal     Subjective:  Pt. seen at bedside for cognitive-linguistic treatment.  Nursing reports no change in status.  Patient was agreeable.   Prior to Session Communication: Nurse    Objective:  Pain:  Pain Assessment  Pain Assessment: 0-10  Pain Score: 0    Recommendations:   Solid Diet Recommendations: Regular  Liquid Diet Recommendations: Thin    Education:  Pt educated on compensatory strategies for short term recall. Pt verbalized understanding.

## 2024-05-18 NOTE — CARE PLAN
The patient's goals for the shift include      The clinical goals for the shift include patient will have no episodes of confusion through out shift.     Over the shift, the patient did make progress toward the following goals. Barriers to progression include none. Recommendations to address these barriers include continue with current plan of care.

## 2024-05-19 NOTE — PROGRESS NOTES
Endocrinology Progress Note  Patient: Bing Holliday  Unit/Bed: 1107/1107-A  YOB: 1960  MRN: [id number]  Acct: [id number]   Admitting Diagnosis: Disorientation [R41.0]  Acute exacerbation of chronic obstructive pulmonary disease (COPD) (Multi) [J44.1]  Hypothermia, initial encounter [T68.XXXA]  Acute pneumonia [J18.9]  Date:  5/7/2024  Hospital Day: 11  Attending: No att. providers found       Complaint:  Chief Complaint   Patient presents with    Altered Mental Status          Assessment     Patient Active Problem List   Diagnosis    COVID-19    Lupus (Multi)    Ambulatory dysfunction    Myelodysplastic syndrome, unspecified (Multi)    Lupus vasculitis (Multi)    Hyperlipidemia    Pneumonia of both lower lobes due to infectious organism    Hallucinations    Leg swelling    Hyperglycemia    JED (acute kidney injury) (CMS-HCC)    Hypernatremia    Proliferative diabetic retinopathy of right eye without macular edema determined by examination associated with type 2 diabetes mellitus (Multi)    Urinary incontinence    Paraparesis (Multi)    Abdominal cramping    Abnormal echocardiogram    Abnormal gait    Abnormal thyroid blood test    Advanced COPD (Multi)    Afferent pupillary defect of right eye    Anemia    Pancytopenia (Multi)    Altitudinal scotoma of right eye    Arcuate scotoma of left eye    Arthropathy    Asymptomatic PVCs    PAF (paroxysmal atrial fibrillation) (Multi)    Balance problem    Bilateral high frequency sensorineural hearing loss    Bradycardia    Branch retinal artery occlusion    Cardiomyopathy (Multi)    Chronic diarrhea    Chronic fatigue    Chronic kidney disease (CKD), stage III (moderate) (Multi)    CKD stage G3a/A2, GFR 45-59 and albumin creatinine ratio  mg/g (Multi)    Combined forms of age-related cataract of left eye    Combined forms of age-related cataract of right eye    Contracture of hand    Snoring    CVA (cerebral vascular accident) (Multi)    Daytime  somnolence    Degeneration of lumbar/lumbosacral disc without myelopathy    Depression, major    Dizziness    Dupuytren's contracture    Eczema    Elevated alkaline phosphatase level    Encephalopathy acute    Facial twitching    Fibromyalgia    Fungal nail infection    GERD (gastroesophageal reflux disease)    Gouty arthropathy    H/O iritis    H/O orthostatic hypotension    Hereditary elliptocytosis (CMS-HCC)    High level of cardiac marker    Hip hematoma, right    Hypothermia    Insomnia    Leg edema, left    Loss of consciousness (Multi)    Low blood sugar reading    Lung nodule, multiple    Lupus nephritis (Multi)    Metabolic encephalopathy    Muscle cramps    Nodule of skin of left lower leg    Obstructive lung disease (Multi)    Osteoarthritis of left hand    Osteoarthritis of right hand    Osteopenia    Osteoporosis    Palpitations    Peripheral visual field defect    Persistent proteinuria    Positive colorectal cancer screening using Cologuard test    Benign essential HTN    Psychosis (Multi)    Pulmonary hypertension (Multi)    Refractive error    Hypertensive retinopathy    Retinal neovascularization    Secondary pulmonary arterial hypertension (Multi)    Shortness of breath on exertion    Spinal stenosis of lumbar region with neurogenic claudication    Subjective tinnitus of both ears    Swelling of right foot    Syncope and collapse    Thrombophlebitis of superficial veins of left lower extremity    Tinnitus of left ear    Vitamin D deficiency    DM type 2 with diabetic peripheral neuropathy (Multi)    Systemic lupus erythematosus (Multi)    Diabetic nephropathy (Multi)    Functional diarrhea    UTI (urinary tract infection)    Moderate protein-calorie malnutrition (Multi)    Shock, unspecified (Multi)    Hyperkalemia    Adrenal insufficiency (Multi)    Seizure-like activity (Multi)    Meningitis (HHS-HCC)    Encephalopathy    Seizure (Multi)    Uncontrolled blood glucose    Cervical spondylosis with  "myelopathy    COPD (chronic obstructive pulmonary disease) (Multi)    Encounter for diabetes education    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)    Chronic kidney disease, stage 4 (severe) (Multi)    Ulcerative (chronic) pancolitis with rectal bleeding (Multi)    Anxiety    Cortical cataract    Hyperparathyroidism due to renal insufficiency (Multi)    Iron deficiency anemia due to chronic blood loss    Left ventricular hypertrophy    Nephrosclerosis    Obesity    Ocular migraine    Persistent cough    Proliferative diabetic retinopathy (Multi)    Raynaud's disease    Disorientation    Bradycardia, unspecified          Plan:  Cosyntropin not conclusive - base cortisol elevated  Plan reduce Hydrocortisone   Reduce Florinef 0.1 mg every other day  Follow up as out patient        SUBJECTIVE    High BP  On Hydrocortisone and florinef  Labs reviewed        VITALS      Vitals:    05/17/24 1818 05/17/24 1922 05/18/24 0357 05/18/24 0906   BP: 139/84 (!) 152/95 156/85 156/81   BP Location:  Right arm Right arm Right arm   Patient Position:  Lying Lying Lying   Pulse: 75 72 60 60   Resp:  16 16 16   Temp: 35.4 °C (95.7 °F) 35.4 °C (95.7 °F) 35 °C (95 °F) 35.6 °C (96.1 °F)   TempSrc:  Temporal Temporal Temporal   SpO2: 100% 94% 92% 97%   Weight:       Height:           Intake/Output Summary (Last 24 hours) at 5/18/2024 2353  Last data filed at 5/18/2024 0525  Gross per 24 hour   Intake --   Output 1000 ml   Net -1000 ml      Wt Readings from Last 4 Encounters:   05/13/24 102 kg (224 lb 13.9 oz)   04/24/24 90.9 kg (200 lb 6.4 oz)   03/19/24 96.5 kg (212 lb 11.9 oz)   03/11/24 96.5 kg (212 lb 11.2 oz)        Allergies:  Allergies   Allergen Reactions    Ace Inhibitors Swelling, Angioedema, Shortness of breath and Other     'FACIAL TWISTING\" \"LOOKS LIKE I HAD A STROKE IN MY SLEEP\"    Hydroxychloroquine Other, Nausea And Vomiting, Nausea/vomiting and Unknown     RETINAL BLEEDING    RETINAL " BLEEDING      RETINAL BLEEDING, Eye problems    Lisinopril Swelling    Penicillins Unknown     tolerates cephalosporins-unknown childhood allergy    Sulfa (Sulfonamide Antibiotics) Hives        PHYSICAL EXAM   Physical Exam  deferred    LABS   Magnesium:  Results from last 7 days   Lab Units 05/18/24  0553 05/17/24  0827 05/16/24  0504   MAGNESIUM mg/dL 1.69 1.73 1.70     Lipid Panel:       Lab Review  Lab Results   Component Value Date    BILITOT 0.3 05/12/2024    CALCIUM 7.8 (L) 05/18/2024    CO2 34 (H) 05/18/2024     05/18/2024    CREATININE 1.40 (H) 05/18/2024    GLUCOSE 111 (H) 05/18/2024    ALKPHOS 114 05/12/2024    K 3.8 05/18/2024    PROT 5.0 (L) 05/12/2024     (H) 05/18/2024    AST 17 05/12/2024    ALT 17 05/12/2024    BUN 25 (H) 05/18/2024    ANIONGAP 11 05/18/2024    MG 1.69 05/18/2024    PHOS 2.4 (L) 05/13/2024    GGT 63 (H) 01/07/2021     04/25/2024    ALBUMIN 2.6 (L) 05/12/2024    LIPASE 30 01/24/2023    GFRF 30 (A) 09/01/2023    GFRMALE CANCELED 04/05/2023     Lab Results   Component Value Date    TRIG 79 03/24/2023    CHOL 160 03/24/2023    HDL 70.9 03/24/2023     Lab Results   Component Value Date    HGBA1C 8.4 (H) 04/24/2024    HGBA1C 8.0 (H) 02/08/2024    HGBA1C 7.1 (H) 01/24/2024     The ASCVD Risk score (Stone DK, et al., 2019) failed to calculate for the following reasons:    The patient has a prior MI or stroke diagnosis   Lab Results   Component Value Date    HGBA1C 8.4 (H) 04/24/2024    HGBA1C 8.0 (H) 02/08/2024    HGBA1C 7.1 (H) 01/24/2024     (H) 05/18/2024    K 3.8 05/18/2024     05/18/2024    CO2 34 (H) 05/18/2024    BUN 25 (H) 05/18/2024    CREATININE 1.40 (H) 05/18/2024    CALCIUM 7.8 (L) 05/18/2024    ALBUMIN 2.6 (L) 05/12/2024    PROT 5.0 (L) 05/12/2024    BILITOT 0.3 05/12/2024    ALKPHOS 114 05/12/2024    ALT 17 05/12/2024    AST 17 05/12/2024    GLUCOSE 111 (H) 05/18/2024    CHOL 160 03/24/2023    TRIG 79 03/24/2023    HDL 70.9 03/24/2023     BHYDRXBUT 0.07 10/23/2023    CPEPTIDE 2.9 04/25/2024    INSAB <0.4 11/21/2023                 Electronically signed by Mela Banuelos MD on 5/18/2024 at 11:53 PM

## 2024-05-20 LAB — G6PD RBC QL: NORMAL

## 2024-05-22 LAB
BACTERIA CSF CULT: NORMAL
GRAM STN SPEC: NORMAL
GRAM STN SPEC: NORMAL

## 2024-05-24 ENCOUNTER — CLINICAL SUPPORT (OUTPATIENT)
Dept: CARDIOLOGY | Facility: CLINIC | Age: 64
End: 2024-05-24
Payer: MEDICARE

## 2024-05-24 DIAGNOSIS — R00.1 BRADYCARDIA, UNSPECIFIED: ICD-10-CM

## 2024-05-24 DIAGNOSIS — Z95.0 PACEMAKER: ICD-10-CM

## 2024-05-24 NOTE — PROGRESS NOTES
Pt. here for wound check of pacemaker implant by Dr. Rodriges on 5/17/24 at [facility]. L clavicular area is well approximated with no erythema or drainage. Pt. denies any fever or chills. Temp 98.1

## 2024-06-04 ENCOUNTER — APPOINTMENT (OUTPATIENT)
Dept: RADIOLOGY | Facility: HOSPITAL | Age: 64
DRG: 602 | End: 2024-06-04
Payer: MEDICARE

## 2024-06-04 ENCOUNTER — HOSPITAL ENCOUNTER (INPATIENT)
Facility: HOSPITAL | Age: 64
DRG: 602 | End: 2024-06-04
Attending: EMERGENCY MEDICINE | Admitting: STUDENT IN AN ORGANIZED HEALTH CARE EDUCATION/TRAINING PROGRAM
Payer: MEDICARE

## 2024-06-04 ENCOUNTER — APPOINTMENT (OUTPATIENT)
Dept: CARDIOLOGY | Facility: HOSPITAL | Age: 64
DRG: 602 | End: 2024-06-04
Payer: MEDICARE

## 2024-06-04 DIAGNOSIS — E83.42 HYPOMAGNESEMIA: ICD-10-CM

## 2024-06-04 DIAGNOSIS — D64.9 ANEMIA, UNSPECIFIED TYPE: ICD-10-CM

## 2024-06-04 DIAGNOSIS — L03.119 CELLULITIS OF LEG WITHOUT FOOT: Primary | ICD-10-CM

## 2024-06-04 DIAGNOSIS — S81.809A OPEN WOUND OF LOWER EXTREMITY, UNSPECIFIED LATERALITY, INITIAL ENCOUNTER: ICD-10-CM

## 2024-06-04 DIAGNOSIS — E86.0 DEHYDRATION: ICD-10-CM

## 2024-06-04 DIAGNOSIS — R41.0 DISORIENTATION: ICD-10-CM

## 2024-06-04 DIAGNOSIS — N17.9 ACUTE KIDNEY INJURY (CMS-HCC): ICD-10-CM

## 2024-06-04 DIAGNOSIS — E27.40 ADRENAL INSUFFICIENCY (MULTI): ICD-10-CM

## 2024-06-04 LAB
ALBUMIN SERPL BCP-MCNC: 3 G/DL (ref 3.4–5)
ALP SERPL-CCNC: 162 U/L (ref 33–136)
ALT SERPL W P-5'-P-CCNC: 15 U/L (ref 7–45)
ANION GAP SERPL CALC-SCNC: 13 MMOL/L (ref 10–20)
APPEARANCE UR: CLEAR
AST SERPL W P-5'-P-CCNC: 18 U/L (ref 9–39)
ATRIAL RATE: 61 BPM
BACTERIA #/AREA URNS AUTO: ABNORMAL /HPF
BASOPHILS # BLD AUTO: 0.01 X10*3/UL (ref 0–0.1)
BASOPHILS NFR BLD AUTO: 0.2 %
BILIRUB SERPL-MCNC: 0.4 MG/DL (ref 0–1.2)
BILIRUB UR STRIP.AUTO-MCNC: NEGATIVE MG/DL
BNP SERPL-MCNC: 157 PG/ML (ref 0–99)
BUN SERPL-MCNC: 41 MG/DL (ref 6–23)
BURR CELLS BLD QL SMEAR: NORMAL
CALCIUM SERPL-MCNC: 8.6 MG/DL (ref 8.6–10.3)
CARDIAC TROPONIN I PNL SERPL HS: 14 NG/L (ref 0–13)
CARDIAC TROPONIN I PNL SERPL HS: 25 NG/L (ref 0–13)
CHLORIDE SERPL-SCNC: 110 MMOL/L (ref 98–107)
CO2 SERPL-SCNC: 27 MMOL/L (ref 21–32)
COLOR UR: ABNORMAL
CREAT SERPL-MCNC: 2.24 MG/DL (ref 0.5–1.05)
EGFRCR SERPLBLD CKD-EPI 2021: 24 ML/MIN/1.73M*2
EOSINOPHIL # BLD AUTO: 0.02 X10*3/UL (ref 0–0.7)
EOSINOPHIL NFR BLD AUTO: 0.3 %
ERYTHROCYTE [DISTWIDTH] IN BLOOD BY AUTOMATED COUNT: 20.3 % (ref 11.5–14.5)
GLUCOSE BLD MANUAL STRIP-MCNC: 185 MG/DL (ref 74–99)
GLUCOSE SERPL-MCNC: 155 MG/DL (ref 74–99)
GLUCOSE UR STRIP.AUTO-MCNC: NEGATIVE MG/DL
HCT VFR BLD AUTO: 26.5 % (ref 36–46)
HGB BLD-MCNC: 8.1 G/DL (ref 12–16)
IMM GRANULOCYTES # BLD AUTO: 0.05 X10*3/UL (ref 0–0.7)
IMM GRANULOCYTES NFR BLD AUTO: 0.8 % (ref 0–0.9)
INR PPP: 1.3 (ref 0.9–1.1)
KETONES UR STRIP.AUTO-MCNC: NEGATIVE MG/DL
LACTATE SERPL-SCNC: 1.2 MMOL/L (ref 0.4–2)
LEUKOCYTE ESTERASE UR QL STRIP.AUTO: NEGATIVE
LYMPHOCYTES # BLD AUTO: 0.98 X10*3/UL (ref 1.2–4.8)
LYMPHOCYTES NFR BLD AUTO: 15.7 %
MAGNESIUM SERPL-MCNC: 1.37 MG/DL (ref 1.6–2.4)
MCH RBC QN AUTO: 30.9 PG (ref 26–34)
MCHC RBC AUTO-ENTMCNC: 30.6 G/DL (ref 32–36)
MCV RBC AUTO: 101 FL (ref 80–100)
MONOCYTES # BLD AUTO: 0.42 X10*3/UL (ref 0.1–1)
MONOCYTES NFR BLD AUTO: 6.7 %
NEUTROPHILS # BLD AUTO: 4.77 X10*3/UL (ref 1.2–7.7)
NEUTROPHILS NFR BLD AUTO: 76.3 %
NITRITE UR QL STRIP.AUTO: NEGATIVE
NRBC BLD-RTO: 0 /100 WBCS (ref 0–0)
OVALOCYTES BLD QL SMEAR: NORMAL
P AXIS: 54 DEGREES
P OFFSET: 192 MS
P ONSET: 125 MS
PH UR STRIP.AUTO: 6 [PH]
PLATELET # BLD AUTO: 92 X10*3/UL (ref 150–450)
POLYCHROMASIA BLD QL SMEAR: NORMAL
POTASSIUM SERPL-SCNC: 3.9 MMOL/L (ref 3.5–5.3)
PR INTERVAL: 180 MS
PROT SERPL-MCNC: 6 G/DL (ref 6.4–8.2)
PROT UR STRIP.AUTO-MCNC: ABNORMAL MG/DL
PROTHROMBIN TIME: 14.6 SECONDS (ref 9.8–12.8)
Q ONSET: 215 MS
QRS COUNT: 10 BEATS
QRS DURATION: 110 MS
QT INTERVAL: 452 MS
QTC CALCULATION(BAZETT): 455 MS
QTC FREDERICIA: 454 MS
R AXIS: -5 DEGREES
RBC # BLD AUTO: 2.62 X10*6/UL (ref 4–5.2)
RBC # UR STRIP.AUTO: NEGATIVE /UL
RBC #/AREA URNS AUTO: ABNORMAL /HPF
RBC MORPH BLD: NORMAL
SODIUM SERPL-SCNC: 146 MMOL/L (ref 136–145)
SP GR UR STRIP.AUTO: 1.01
T AXIS: -30 DEGREES
T OFFSET: 441 MS
UROBILINOGEN UR STRIP.AUTO-MCNC: <2 MG/DL
VENTRICULAR RATE: 61 BPM
WBC # BLD AUTO: 6.3 X10*3/UL (ref 4.4–11.3)
WBC #/AREA URNS AUTO: ABNORMAL /HPF

## 2024-06-04 PROCEDURE — 82947 ASSAY GLUCOSE BLOOD QUANT: CPT

## 2024-06-04 PROCEDURE — 83605 ASSAY OF LACTIC ACID: CPT | Performed by: EMERGENCY MEDICINE

## 2024-06-04 PROCEDURE — 96366 THER/PROPH/DIAG IV INF ADDON: CPT

## 2024-06-04 PROCEDURE — 2500000001 HC RX 250 WO HCPCS SELF ADMINISTERED DRUGS (ALT 637 FOR MEDICARE OP): Performed by: STUDENT IN AN ORGANIZED HEALTH CARE EDUCATION/TRAINING PROGRAM

## 2024-06-04 PROCEDURE — 83880 ASSAY OF NATRIURETIC PEPTIDE: CPT | Performed by: EMERGENCY MEDICINE

## 2024-06-04 PROCEDURE — 93005 ELECTROCARDIOGRAM TRACING: CPT

## 2024-06-04 PROCEDURE — 82565 ASSAY OF CREATININE: CPT | Performed by: EMERGENCY MEDICINE

## 2024-06-04 PROCEDURE — 84484 ASSAY OF TROPONIN QUANT: CPT | Mod: 91 | Performed by: EMERGENCY MEDICINE

## 2024-06-04 PROCEDURE — 71045 X-RAY EXAM CHEST 1 VIEW: CPT | Performed by: RADIOLOGY

## 2024-06-04 PROCEDURE — 36415 COLL VENOUS BLD VENIPUNCTURE: CPT | Performed by: EMERGENCY MEDICINE

## 2024-06-04 PROCEDURE — 81001 URINALYSIS AUTO W/SCOPE: CPT | Performed by: EMERGENCY MEDICINE

## 2024-06-04 PROCEDURE — 96365 THER/PROPH/DIAG IV INF INIT: CPT

## 2024-06-04 PROCEDURE — 2500000004 HC RX 250 GENERAL PHARMACY W/ HCPCS (ALT 636 FOR OP/ED): Performed by: EMERGENCY MEDICINE

## 2024-06-04 PROCEDURE — 87086 URINE CULTURE/COLONY COUNT: CPT | Mod: ELYLAB | Performed by: STUDENT IN AN ORGANIZED HEALTH CARE EDUCATION/TRAINING PROGRAM

## 2024-06-04 PROCEDURE — 96375 TX/PRO/DX INJ NEW DRUG ADDON: CPT

## 2024-06-04 PROCEDURE — 99285 EMERGENCY DEPT VISIT HI MDM: CPT | Mod: 25

## 2024-06-04 PROCEDURE — 83735 ASSAY OF MAGNESIUM: CPT | Performed by: EMERGENCY MEDICINE

## 2024-06-04 PROCEDURE — 85610 PROTHROMBIN TIME: CPT | Performed by: EMERGENCY MEDICINE

## 2024-06-04 PROCEDURE — 71045 X-RAY EXAM CHEST 1 VIEW: CPT

## 2024-06-04 PROCEDURE — 2500000002 HC RX 250 W HCPCS SELF ADMINISTERED DRUGS (ALT 637 FOR MEDICARE OP, ALT 636 FOR OP/ED): Performed by: STUDENT IN AN ORGANIZED HEALTH CARE EDUCATION/TRAINING PROGRAM

## 2024-06-04 PROCEDURE — 85025 COMPLETE CBC W/AUTO DIFF WBC: CPT | Performed by: EMERGENCY MEDICINE

## 2024-06-04 PROCEDURE — 1210000001 HC SEMI-PRIVATE ROOM DAILY

## 2024-06-04 PROCEDURE — 83036 HEMOGLOBIN GLYCOSYLATED A1C: CPT | Mod: ELYLAB | Performed by: STUDENT IN AN ORGANIZED HEALTH CARE EDUCATION/TRAINING PROGRAM

## 2024-06-04 PROCEDURE — 99223 1ST HOSP IP/OBS HIGH 75: CPT | Performed by: STUDENT IN AN ORGANIZED HEALTH CARE EDUCATION/TRAINING PROGRAM

## 2024-06-04 RX ORDER — INSULIN GLARGINE 100 [IU]/ML
10 INJECTION, SOLUTION SUBCUTANEOUS EVERY MORNING
Status: DISCONTINUED | OUTPATIENT
Start: 2024-06-05 | End: 2024-06-17

## 2024-06-04 RX ORDER — LEVOFLOXACIN 250 MG/1
250 TABLET ORAL
Status: DISCONTINUED | OUTPATIENT
Start: 2024-06-04 | End: 2024-06-05

## 2024-06-04 RX ORDER — LEVETIRACETAM 500 MG/1
500 TABLET ORAL EVERY 12 HOURS
Status: DISCONTINUED | OUTPATIENT
Start: 2024-06-04 | End: 2024-06-09

## 2024-06-04 RX ORDER — ACETAMINOPHEN 325 MG/1
650 TABLET ORAL EVERY 4 HOURS PRN
Status: DISCONTINUED | OUTPATIENT
Start: 2024-06-04 | End: 2024-06-17 | Stop reason: HOSPADM

## 2024-06-04 RX ORDER — POTASSIUM CHLORIDE 20 MEQ/1
20 TABLET, EXTENDED RELEASE ORAL DAILY
COMMUNITY

## 2024-06-04 RX ORDER — MAGNESIUM SULFATE HEPTAHYDRATE 40 MG/ML
2 INJECTION, SOLUTION INTRAVENOUS ONCE
Status: COMPLETED | OUTPATIENT
Start: 2024-06-04 | End: 2024-06-04

## 2024-06-04 RX ORDER — ACETAMINOPHEN 500 MG
5 TABLET ORAL NIGHTLY
Status: DISCONTINUED | OUTPATIENT
Start: 2024-06-04 | End: 2024-06-17 | Stop reason: HOSPADM

## 2024-06-04 RX ORDER — FENTANYL CITRATE 50 UG/ML
25 INJECTION, SOLUTION INTRAMUSCULAR; INTRAVENOUS ONCE
Status: COMPLETED | OUTPATIENT
Start: 2024-06-04 | End: 2024-06-04

## 2024-06-04 RX ORDER — DEXTROSE 50 % IN WATER (D50W) INTRAVENOUS SYRINGE
25
Status: DISCONTINUED | OUTPATIENT
Start: 2024-06-04 | End: 2024-06-17 | Stop reason: HOSPADM

## 2024-06-04 RX ORDER — OLANZAPINE 5 MG/1
5 TABLET ORAL NIGHTLY
Status: DISCONTINUED | OUTPATIENT
Start: 2024-06-04 | End: 2024-06-12

## 2024-06-04 RX ORDER — MECLIZINE HCL 12.5 MG 12.5 MG/1
25 TABLET ORAL EVERY 12 HOURS PRN
Status: DISCONTINUED | OUTPATIENT
Start: 2024-06-04 | End: 2024-06-17 | Stop reason: HOSPADM

## 2024-06-04 RX ORDER — OLANZAPINE 7.5 MG/1
7.5 TABLET ORAL NIGHTLY
COMMUNITY
End: 2024-06-17 | Stop reason: HOSPADM

## 2024-06-04 RX ORDER — ATORVASTATIN CALCIUM 20 MG/1
40 TABLET, FILM COATED ORAL NIGHTLY
Status: DISCONTINUED | OUTPATIENT
Start: 2024-06-04 | End: 2024-06-17 | Stop reason: HOSPADM

## 2024-06-04 RX ORDER — DEXTROSE 50 % IN WATER (D50W) INTRAVENOUS SYRINGE
12.5
Status: DISCONTINUED | OUTPATIENT
Start: 2024-06-04 | End: 2024-06-17 | Stop reason: HOSPADM

## 2024-06-04 RX ORDER — OLANZAPINE 5 MG/1
7.5 TABLET ORAL NIGHTLY
Status: DISCONTINUED | OUTPATIENT
Start: 2024-06-04 | End: 2024-06-04

## 2024-06-04 RX ORDER — HYDROCODONE BITARTRATE AND ACETAMINOPHEN 5; 325 MG/1; MG/1
1 TABLET ORAL EVERY 6 HOURS PRN
Status: DISCONTINUED | OUTPATIENT
Start: 2024-06-04 | End: 2024-06-17 | Stop reason: HOSPADM

## 2024-06-04 RX ORDER — ACETAMINOPHEN 160 MG/5ML
650 SOLUTION ORAL EVERY 4 HOURS PRN
Status: DISCONTINUED | OUTPATIENT
Start: 2024-06-04 | End: 2024-06-17 | Stop reason: HOSPADM

## 2024-06-04 RX ORDER — INSULIN LISPRO 100 [IU]/ML
0-10 INJECTION, SOLUTION INTRAVENOUS; SUBCUTANEOUS
Status: DISCONTINUED | OUTPATIENT
Start: 2024-06-04 | End: 2024-06-17 | Stop reason: HOSPADM

## 2024-06-04 RX ORDER — LANOLIN ALCOHOL/MO/W.PET/CERES
100 CREAM (GRAM) TOPICAL DAILY
Status: DISCONTINUED | OUTPATIENT
Start: 2024-06-04 | End: 2024-06-17 | Stop reason: HOSPADM

## 2024-06-04 RX ORDER — PANTOPRAZOLE SODIUM 40 MG/1
40 TABLET, DELAYED RELEASE ORAL DAILY
Status: DISCONTINUED | OUTPATIENT
Start: 2024-06-05 | End: 2024-06-17 | Stop reason: HOSPADM

## 2024-06-04 RX ORDER — FOLIC ACID 1 MG/1
1 TABLET ORAL DAILY
Status: DISCONTINUED | OUTPATIENT
Start: 2024-06-04 | End: 2024-06-17 | Stop reason: HOSPADM

## 2024-06-04 RX ORDER — MYCOPHENOLATE MOFETIL 250 MG/1
1000 CAPSULE ORAL 2 TIMES DAILY
Status: DISCONTINUED | OUTPATIENT
Start: 2024-06-04 | End: 2024-06-17 | Stop reason: HOSPADM

## 2024-06-04 RX ORDER — FUROSEMIDE 10 MG/ML
40 INJECTION INTRAMUSCULAR; INTRAVENOUS EVERY 12 HOURS
Status: DISCONTINUED | OUTPATIENT
Start: 2024-06-04 | End: 2024-06-07

## 2024-06-04 RX ORDER — METOPROLOL TARTRATE 25 MG/1
25 TABLET, FILM COATED ORAL 2 TIMES DAILY
Status: DISCONTINUED | OUTPATIENT
Start: 2024-06-04 | End: 2024-06-17 | Stop reason: HOSPADM

## 2024-06-04 RX ORDER — MAGNESIUM SULFATE HEPTAHYDRATE 40 MG/ML
2 INJECTION, SOLUTION INTRAVENOUS ONCE
Status: DISCONTINUED | OUTPATIENT
Start: 2024-06-04 | End: 2024-06-04

## 2024-06-04 RX ORDER — FLUTICASONE FUROATE AND VILANTEROL 200; 25 UG/1; UG/1
1 POWDER RESPIRATORY (INHALATION)
Status: DISCONTINUED | OUTPATIENT
Start: 2024-06-04 | End: 2024-06-17 | Stop reason: HOSPADM

## 2024-06-04 RX ORDER — FLUDROCORTISONE ACETATE 0.1 MG/1
0.1 TABLET ORAL EVERY OTHER DAY
Status: DISCONTINUED | OUTPATIENT
Start: 2024-06-05 | End: 2024-06-17 | Stop reason: HOSPADM

## 2024-06-04 RX ORDER — FUROSEMIDE 20 MG/1
40 TABLET ORAL DAILY
COMMUNITY

## 2024-06-04 RX ORDER — METRONIDAZOLE 500 MG/100ML
500 INJECTION, SOLUTION INTRAVENOUS ONCE
Status: DISCONTINUED | OUTPATIENT
Start: 2024-06-04 | End: 2024-06-07

## 2024-06-04 RX ORDER — HYDROCORTISONE 5 MG/1
20 TABLET ORAL DAILY
Status: DISCONTINUED | OUTPATIENT
Start: 2024-06-04 | End: 2024-06-08

## 2024-06-04 RX ORDER — LEVOFLOXACIN 500 MG/1
500 TABLET, FILM COATED ORAL DAILY
COMMUNITY
Start: 2024-05-31

## 2024-06-04 RX ADMIN — MAGNESIUM SULFATE HEPTAHYDRATE 2 G: 40 INJECTION, SOLUTION INTRAVENOUS at 16:30

## 2024-06-04 RX ADMIN — OLANZAPINE 5 MG: 5 TABLET, FILM COATED ORAL at 22:13

## 2024-06-04 RX ADMIN — FENTANYL CITRATE 25 MCG: 50 INJECTION, SOLUTION INTRAMUSCULAR; INTRAVENOUS at 16:29

## 2024-06-04 RX ADMIN — SODIUM CHLORIDE, POTASSIUM CHLORIDE, SODIUM LACTATE AND CALCIUM CHLORIDE 500 ML: 600; 310; 30; 20 INJECTION, SOLUTION INTRAVENOUS at 16:30

## 2024-06-04 RX ADMIN — CEFEPIME 2 G: 2 INJECTION, POWDER, FOR SOLUTION INTRAVENOUS at 19:18

## 2024-06-04 RX ADMIN — HYDROCODONE BITARTRATE AND ACETAMINOPHEN 1 TABLET: 5; 325 TABLET ORAL at 22:27

## 2024-06-04 RX ADMIN — APIXABAN 2.5 MG: 5 TABLET, FILM COATED ORAL at 22:12

## 2024-06-04 RX ADMIN — VANCOMYCIN HYDROCHLORIDE 2 G: 10 INJECTION, POWDER, LYOPHILIZED, FOR SOLUTION INTRAVENOUS at 16:47

## 2024-06-04 RX ADMIN — ATORVASTATIN CALCIUM 40 MG: 20 TABLET, FILM COATED ORAL at 22:13

## 2024-06-04 RX ADMIN — METOPROLOL TARTRATE 25 MG: 25 TABLET, FILM COATED ORAL at 22:13

## 2024-06-04 RX ADMIN — FENTANYL CITRATE 25 MCG: 50 INJECTION, SOLUTION INTRAMUSCULAR; INTRAVENOUS at 18:03

## 2024-06-04 RX ADMIN — Medication 5 MG: at 22:13

## 2024-06-04 SDOH — SOCIAL STABILITY: SOCIAL INSECURITY: DOES ANYONE TRY TO KEEP YOU FROM HAVING/CONTACTING OTHER FRIENDS OR DOING THINGS OUTSIDE YOUR HOME?: NO

## 2024-06-04 SDOH — SOCIAL STABILITY: SOCIAL INSECURITY: WERE YOU ABLE TO COMPLETE ALL THE BEHAVIORAL HEALTH SCREENINGS?: YES

## 2024-06-04 SDOH — SOCIAL STABILITY: SOCIAL INSECURITY: DO YOU FEEL UNSAFE GOING BACK TO THE PLACE WHERE YOU ARE LIVING?: NO

## 2024-06-04 SDOH — SOCIAL STABILITY: SOCIAL INSECURITY: HAVE YOU HAD THOUGHTS OF HARMING ANYONE ELSE?: NO

## 2024-06-04 SDOH — SOCIAL STABILITY: SOCIAL INSECURITY: ARE YOU OR HAVE YOU BEEN THREATENED OR ABUSED PHYSICALLY, EMOTIONALLY, OR SEXUALLY BY ANYONE?: NO

## 2024-06-04 SDOH — SOCIAL STABILITY: SOCIAL INSECURITY: ABUSE: ADULT

## 2024-06-04 SDOH — SOCIAL STABILITY: SOCIAL INSECURITY: HAVE YOU HAD ANY THOUGHTS OF HARMING ANYONE ELSE?: NO

## 2024-06-04 SDOH — SOCIAL STABILITY: SOCIAL INSECURITY: ARE THERE ANY APPARENT SIGNS OF INJURIES/BEHAVIORS THAT COULD BE RELATED TO ABUSE/NEGLECT?: NO

## 2024-06-04 SDOH — SOCIAL STABILITY: SOCIAL INSECURITY: DO YOU FEEL ANYONE HAS EXPLOITED OR TAKEN ADVANTAGE OF YOU FINANCIALLY OR OF YOUR PERSONAL PROPERTY?: NO

## 2024-06-04 SDOH — SOCIAL STABILITY: SOCIAL INSECURITY: HAS ANYONE EVER THREATENED TO HURT YOUR FAMILY OR YOUR PETS?: NO

## 2024-06-04 ASSESSMENT — LIFESTYLE VARIABLES
HAVE YOU EVER FELT YOU SHOULD CUT DOWN ON YOUR DRINKING: NO
HOW MANY STANDARD DRINKS CONTAINING ALCOHOL DO YOU HAVE ON A TYPICAL DAY: 1 OR 2
HOW OFTEN DO YOU HAVE A DRINK CONTAINING ALCOHOL: MONTHLY OR LESS
AUDIT-C TOTAL SCORE: 1
SKIP TO QUESTIONS 9-10: 1
TOTAL SCORE: 0
EVER FELT BAD OR GUILTY ABOUT YOUR DRINKING: NO
SUBSTANCE_ABUSE_PAST_12_MONTHS: NO
EVER HAD A DRINK FIRST THING IN THE MORNING TO STEADY YOUR NERVES TO GET RID OF A HANGOVER: NO
HOW OFTEN DO YOU HAVE 6 OR MORE DRINKS ON ONE OCCASION: NEVER
AUDIT-C TOTAL SCORE: 1
HAVE PEOPLE ANNOYED YOU BY CRITICIZING YOUR DRINKING: NO
PRESCIPTION_ABUSE_PAST_12_MONTHS: NO

## 2024-06-04 ASSESSMENT — ACTIVITIES OF DAILY LIVING (ADL)
HEARING - LEFT EAR: FUNCTIONAL
LACK_OF_TRANSPORTATION: NO
TOILETING: INDEPENDENT
WALKS IN HOME: INDEPENDENT
JUDGMENT_ADEQUATE_SAFELY_COMPLETE_DAILY_ACTIVITIES: YES
FEEDING YOURSELF: INDEPENDENT
DRESSING YOURSELF: INDEPENDENT
HEARING - RIGHT EAR: FUNCTIONAL
BATHING: INDEPENDENT
ASSISTIVE_DEVICE: WALKER
PATIENT'S MEMORY ADEQUATE TO SAFELY COMPLETE DAILY ACTIVITIES?: YES
GROOMING: INDEPENDENT
ADEQUATE_TO_COMPLETE_ADL: YES

## 2024-06-04 ASSESSMENT — COGNITIVE AND FUNCTIONAL STATUS - GENERAL
MOVING TO AND FROM BED TO CHAIR: A LITTLE
DAILY ACTIVITIY SCORE: 24
MOBILITY SCORE: 19
WALKING IN HOSPITAL ROOM: A LITTLE
CLIMB 3 TO 5 STEPS WITH RAILING: A LOT
PATIENT BASELINE BEDBOUND: NO
STANDING UP FROM CHAIR USING ARMS: A LITTLE

## 2024-06-04 ASSESSMENT — PAIN DESCRIPTION - PAIN TYPE: TYPE: ACUTE PAIN;CHRONIC PAIN

## 2024-06-04 ASSESSMENT — PAIN DESCRIPTION - LOCATION
LOCATION: ANKLE
LOCATION: LEG

## 2024-06-04 ASSESSMENT — PAIN DESCRIPTION - ORIENTATION
ORIENTATION: RIGHT;LEFT
ORIENTATION: LEFT

## 2024-06-04 ASSESSMENT — PAIN DESCRIPTION - ONSET: ONSET: ONGOING

## 2024-06-04 ASSESSMENT — PAIN - FUNCTIONAL ASSESSMENT
PAIN_FUNCTIONAL_ASSESSMENT: 0-10
PAIN_FUNCTIONAL_ASSESSMENT: 0-10

## 2024-06-04 ASSESSMENT — PAIN DESCRIPTION - DESCRIPTORS
DESCRIPTORS: ACHING;STABBING
DESCRIPTORS: ACHING

## 2024-06-04 ASSESSMENT — PAIN DESCRIPTION - PROGRESSION: CLINICAL_PROGRESSION: NOT CHANGED

## 2024-06-04 ASSESSMENT — PAIN SCALES - GENERAL
PAINLEVEL_OUTOF10: 7
PAINLEVEL_OUTOF10: 8
PAINLEVEL_OUTOF10: 7

## 2024-06-04 ASSESSMENT — PAIN DESCRIPTION - FREQUENCY: FREQUENCY: CONSTANT/CONTINUOUS

## 2024-06-04 NOTE — ED PROVIDER NOTES
63-year-old female presents emergency department with chief complaint of abnormal labs.  Patient reports she comes from a care facility and they check daily labs on her.  Patient denies any fevers.  She reports occasional cough.  No chest pain or difficulty breathing.  She denies any abdominal pain, nausea, vomiting, dysuria, diarrhea, constipation, black or bloody stools.  She does report urinary frequency.  Patient also states that she has chronic lower extremity edema that has been worse over the past week or so.  Patient states she has weeping wounds of bilateral lower extremities that are painful. Chart review shows significant past medical history for adrenocortical insufficiency, generalized weakness, hyperparathyroidism, lupus, diabetes, COPD, protein calorie malnutrition, anxiety, Raynaud's, iron deficiency anemia, rheumatoid arthritis, chronic kidney disease, depression, pulmonary hypertension, cardiomyopathy, paroxysmal A-fib, gout, fibromyalgia, hypertension, hyperlipidemia, and patient is on Eliquis.       History provided by:  Patient   used: No           ------------------------------------------------------------------------------------------------------------------------------------------    VS: As documented in the triage note and EMR flowsheet from this visit were reviewed.    Review of Systems  Constitutional: no fever, chills  Eyes: no redness, discharge, pain  HENT: no sore throat, nose bleeds, congestion, rhinorrhea   Cardiovascular: no chest pain, palpitations reports leg edema that is worsened  Respiratory: no shortness of breath, wheezing admits occasional cough  GI: no nausea, diarrhea, pain, vomiting, constipation, BRBPR, melena  : no dysuria, hematuria reports urinary frequency  Musculoskeletal: no neck pain, stiffness,  no joint deformity, swelling  Skin: no rash, erythema, patient has bilateral lower extremity weeping wounds  Neurological: no headache, dizziness,  lightheadedness,numbness, or tingling patient has generalized weakness related to her lupus.  Ambulates using a walker at baseline  Psychiatric: no suicidal thoughts, confusion, agitation  Metabolic: no polyuria or polydipsia  Hematologic: Patient is on Eliquis  Immunology: No immunocompromise state    [facility]  Nursing notes reviewed and confirmed by me.  Chart review performed including medications, allergies, and medical, surgical, and family history  Visit Vitals  /62 (BP Location: Right arm, Patient Position: Lying)   Pulse 60   Temp 35.8 °C (96.4 °F) (Axillary)   Resp 16     Physical Exam  Vitals and nursing note reviewed.   Constitutional:       General: She is not in acute distress.     Appearance: Normal appearance. She is ill-appearing.   HENT:      Head: Normocephalic and atraumatic.      Right Ear: External ear normal.      Left Ear: External ear normal.      Nose: Nose normal. No congestion or rhinorrhea.      Mouth/Throat:      Mouth: Mucous membranes are moist.      Pharynx: No oropharyngeal exudate or posterior oropharyngeal erythema.   Eyes:      Extraocular Movements: Extraocular movements intact.      Conjunctiva/sclera: Conjunctivae normal.      Pupils: Pupils are equal, round, and reactive to light.   Cardiovascular:      Rate and Rhythm: Normal rate and regular rhythm.      Pulses: Normal pulses.      Heart sounds: Normal heart sounds.   Pulmonary:      Effort: Pulmonary effort is normal. No respiratory distress.      Breath sounds: No stridor. No wheezing, rhonchi or rales.      Comments: Coarse breath sounds bilaterally  Abdominal:      General: There is no distension.      Palpations: Abdomen is soft.      Tenderness: There is no abdominal tenderness. There is no guarding or rebound.   Musculoskeletal:         General: No swelling or deformity. Normal range of motion.      Cervical back: Normal range of motion and neck supple. No rigidity.      Right lower leg: Edema present.      Left  lower leg: Edema present.      Comments:   Bilateral lower extremity edema with calf tenderness.   Skin:     General: Skin is warm and dry.      Capillary Refill: Capillary refill takes less than 2 seconds.      Coloration: Skin is not jaundiced.      Findings: Erythema (Bilateral lower extremities with erythema and warmth.  Patient has open wounds that are weeping serosanguineous fluid bilateral lower extremities anteriorly and left lateral foot.  Area is tender to palpation.  No crepitance.) present. No rash.          Neurological:      General: No focal deficit present.      Mental Status: She is alert and oriented to person, place, and time.      Sensory: No sensory deficit.      Motor: Weakness (Generalized) present.      Comments: Patient is generally weak on my exam without focal deficit.   Psychiatric:         Mood and Affect: Mood normal.         Behavior: Behavior normal.        Past Medical History:   Diagnosis Date    Abnormal kidney function 04/04/2023    Acute headache 03/10/2024    Acute low back pain 10/30/2023    Acute upper respiratory infection, unspecified 03/04/2020    Acute URI    Acute upper respiratory infection, unspecified 09/30/2015    URTI (acute upper respiratory infection)    Arthritis     Atypical facial pain 03/10/2024    Body mass index (BMI) 23.0-23.9, adult 10/15/2021    BMI 23.0-23.9, adult    Body mass index (BMI) 33.0-33.9, adult 03/04/2020    BMI 33.0-33.9,adult    Cardiomegaly 08/27/2013    Left ventricular hypertrophy    Chest pain 06/10/2022    Comment on above: Added by Problem List Migration; 2013-3-12; Moved to Suppressed Nov 25 2013 9:16PM; Comment on above: Added by Problem List Migration; 2013-3-12; Moved to Suppressed Nov 25 2013 9:16PM;    Chronic kidney disease, stage 3 unspecified (Multi) 07/02/2013    Chronic kidney disease, stage III (moderate)    Confusional state 03/10/2024    Contact with and (suspected) exposure to covid-19 04/04/2023    Delirium 03/10/2024     Disease of pericardium, unspecified ([facility]) 07/02/2013    Pericardial disease    Encounter for follow-up examination after completed treatment for conditions other than malignant neoplasm 10/06/2022    Hospital discharge follow-up    Generalized contraction of visual field, right eye 01/29/2015    Generalized contraction of visual field of right eye    Hearing loss 03/10/2024    History of cataract 03/10/2024    History of thrombocytopenia 03/10/2024    Comment on above: Added by Problem List Migration; 2013-7-2;    Homonymous bilateral field defects, right side 04/29/2016    Homonymous bilateral field defects of right side    Hypertensive chronic kidney disease with stage 1 through stage 4 chronic kidney disease, or unspecified chronic kidney disease 07/02/2013    Nephrosclerosis    Laceration without foreign body, left foot, initial encounter 07/03/2018    Foot laceration, left, initial encounter    Localized edema 03/10/2024    Mass of skin 03/10/2024    Migraine with aura, not intractable, without status migrainosus 10/24/2022    Ocular migraine    Open wound 03/10/2024    Open wound of left foot 03/10/2024    Other conditions influencing health status 07/02/2013    Chronic Glomerulonephritis In Diseases Classified Elsewhere    Other conditions influencing health status 07/02/2013    Progressive Familial Myoclonic Epilepsy    Other conditions influencing health status 07/02/2013    Protein S Deficiency    Other conditions influencing health status 05/22/2015    Familial Combined Hyperlipidemia    Other conditions influencing health status 10/24/2022    IDDM (insulin dependent diabetes mellitus)    Other conditions influencing health status 03/14/2022    Diabetes mellitus, insulin dependent (IDDM), uncontrolled    Other long term (current) drug therapy 10/24/2022    Long-term use of Plaquenil    Overweight with body mass index (BMI) 25.0-29.9 03/10/2024    Pain of toe 03/10/2024    Personal history of  COVID-19 04/04/2023    Personal history of diseases of the blood and blood-forming organs and certain disorders involving the immune mechanism 07/02/2013    History of thrombocytopenia    Personal history of diseases of the skin and subcutaneous tissue 08/11/2015    History of foot ulcer    Personal history of nephrotic syndrome 07/02/2013    History of nephrotic syndrome    Personal history of other diseases of the circulatory system 08/27/2013    History of sinus tachycardia    Personal history of other diseases of the nervous system and sense organs     History of cataract    Personal history of other diseases of the respiratory system     History of bronchitis    Personal history of other infectious and parasitic diseases 07/02/2013    History of hepatitis    Personal history of other specified conditions     History of shortness of breath    Personal history of other specified conditions 08/27/2013    History of edema    Posterior epistaxis 03/10/2024    Puckering of macula, right eye 10/24/2022    ERM OD (epiretinal membrane, right eye)    Raynaud's syndrome without gangrene 07/02/2013    Raynaud's disease    Sinusitis 03/10/2024    Systemic lupus erythematosus, unspecified (Multi) 07/24/2015    SLE (systemic lupus erythematosus)    Systemic lupus erythematosus, unspecified (Multi) 07/24/2015    SLE (systemic lupus erythematosus)    Systemic lupus erythematosus, unspecified (Multi) 07/24/2015    Systemic lupus    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) 07/02/2013    Type 2 diabetes with nephropathy    Type 2 diabetes mellitus with mild nonproliferative diabetic retinopathy without macular edema, left eye (Multi) 07/27/2015    Non-proliferative diabetic retinopathy, left eye    Type 2 diabetes mellitus with mild nonproliferative diabetic retinopathy without macular edema, unspecified eye (Multi) 07/24/2015    Mild non proliferative diabetic retinopathy    Type 2 diabetes mellitus with  proliferative diabetic retinopathy without macular edema, right eye (Multi) 07/27/2015    Proliferative diabetic retinopathy of right eye    Type 2 diabetes mellitus with proliferative diabetic retinopathy without macular edema, unspecified eye (Multi) 07/24/2015    Diabetic proliferative retinopathy    Unspecified acute and subacute iridocyclitis 07/24/2015    Acute iritis, right eye    Unspecified open wound, left foot, sequela 07/03/2018    Wound, open, foot, left, sequela      Past Surgical History:   Procedure Laterality Date    ANKLE SURGERY  01/29/2015    Ankle Surgery    CARDIAC ELECTROPHYSIOLOGY PROCEDURE Left 5/17/2024    Procedure: PPM IMPLANT DUAL;  Surgeon: Antonio Rodriges MD;  Location: [facility];  Service: Electrophysiology;  Laterality: Left;  Pt. needs platelets and Prednisone prior to procedure    CHOLECYSTECTOMY  01/29/2015    Cholecystectomy    CT GUIDED PERCUTANEOUS BIOPSY BONE DEEP  5/4/2021    CT GUIDED PERCUTANEOUS BIOPSY BONE DEEP 5/4/2021 [facility] CLINICAL LEGAC[facility]    EYE SURGERY  03/06/2015    Eye Surgery    FOOT SURGERY  01/29/2015    Foot Surgery    MR HEAD ANGIO WO IV CONTRAST  7/26/2013    MR HEAD ANGIO WO IV CONTRAST 7/26/2013 [facility] CLINICAL LEGACY    MR HEAD ANGIO WO IV CONTRAST  9/17/2021    MR HEAD ANGIO WO IV CONTRAST 9/17/2021 [facility]    MR HEAD ANGIO WO IV CONTRAST  3/25/2023    MR HEAD ANGIO WO IV CONTRAST [facility]    MR NECK ANGIO WO IV CONTRAST  7/26/2013    MR NECK ANGIO WO IV CONTRAST 7/26/2013 [facility] CLINICAL LEGACY    MR NECK ANGIO WO IV CONTRAST  9/17/2021    MR NECK ANGIO WO IV CONTRAST 9/17/2021 [facility]    MR NECK ANGIO WO IV CONTRAST  3/25/2023    MR NECK ANGIO WO IV CONTRAST [facility]    OTHER SURGICAL HISTORY  01/29/2015    Creation Of Pericardial Window    OTHER SURGICAL HISTORY  01/29/2015    Quadricepsplasty    TOTAL HIP ARTHROPLASTY  01/29/2015    Hip Replacement      Social History     Socioeconomic History    Marital status:       Spouse name: Not on file    Number of children: Not on file    Years of education: Not on file    Highest education level: Not on file   Occupational History    Not on file   Tobacco Use    Smoking status: Never     Passive exposure: Never    Smokeless tobacco: Never   Vaping Use    Vaping status: Never Used   Substance and Sexual Activity    Alcohol use: Not Currently     Comment: RARE    Drug use: Never    Sexual activity: Defer   Other Topics Concern    Not on file   Social History Narrative    Not on file     Social Determinants of Health     Financial Resource Strain: Patient Unable To Answer (5/10/2024)    Overall Financial Resource Strain (CARDIA)     Difficulty of Paying Living Expenses: Patient unable to answer   Food Insecurity: Patient Unable To Answer (3/9/2024)    Hunger Vital Sign     Worried About Running Out of Food in the Last Year: Patient unable to answer     Ran Out of Food in the Last Year: Patient unable to answer   Transportation Needs: Patient Unable To Answer (5/10/2024)    PRAPARE - Transportation     Lack of Transportation (Medical): Patient unable to answer     Lack of Transportation (Non-Medical): Patient unable to answer   Physical Activity: Patient Declined (1/15/2024)    Exercise Vital Sign     Days of Exercise per Week: Patient declined     Minutes of Exercise per Session: Patient declined   Stress: No Stress Concern Present (1/15/2024)    [employer] [place] [employer] - Occupational Stress Questionnaire     Feeling of Stress : Not at all   Social Connections: Unknown (1/15/2024)    Social Connection and Isolation Panel [NHANES]     Frequency of Communication with Friends and Family: More than three times a week     Frequency of Social Gatherings with Friends and Family: More than three times a week     Attends Tenriism Services: Patient declined     Active Member of Clubs or Organizations: Patient declined     Attends Club or Organization Meetings: Patient declined      Marital Status: Patient unable to answer   Intimate Partner Violence: Not At Risk (1/15/2024)    Humiliation, Afraid, Rape, and Kick questionnaire     Fear of Current or Ex-Partner: No     Emotionally Abused: No     Physically Abused: No     Sexually Abused: No   Housing Stability: Patient Unable To Answer (5/10/2024)    Housing Stability Vital Sign     Unable to Pay for Housing in the Last Year: Patient unable to answer     Number of Places Lived in the Last Year: 1     Unstable Housing in the Last Year: Patient unable to answer      ------------------------------------------------------------------------------------------------------------------------------------------  XR chest 1 view   Final Result   No focal infiltrate or pneumothorax is identified.        MACRO:   None.        Signed by: Nic Moseley 6/4/2024 2:39 PM   Dictation workstation:   [id number]         Labs Reviewed   CBC WITH AUTO DIFFERENTIAL - Abnormal       Result Value    WBC 6.3      nRBC 0.0      RBC 2.62 (*)     Hemoglobin 8.1 (*)     Hematocrit 26.5 (*)      (*)     MCH 30.9      MCHC 30.6 (*)     RDW 20.3 (*)     Platelets 92 (*)     Neutrophils % 76.3      Immature Granulocytes %, Automated 0.8      Lymphocytes % 15.7      Monocytes % 6.7      Eosinophils % 0.3      Basophils % 0.2      Neutrophils Absolute 4.77      Immature Granulocytes Absolute, Automated 0.05      Lymphocytes Absolute 0.98 (*)     Monocytes Absolute 0.42      Eosinophils Absolute 0.02      Basophils Absolute 0.01     COMPREHENSIVE METABOLIC PANEL - Abnormal    Glucose 155 (*)     Sodium 146 (*)     Potassium 3.9      Chloride 110 (*)     Bicarbonate 27      Anion Gap 13      Urea Nitrogen 41 (*)     Creatinine 2.24 (*)     eGFR 24 (*)     Calcium 8.6      Albumin 3.0 (*)     Alkaline Phosphatase 162 (*)     Total Protein 6.0 (*)     AST 18      Bilirubin, Total 0.4      ALT 15     MAGNESIUM - Abnormal    Magnesium 1.37 (*)    TROPONIN I, HIGH SENSITIVITY -  Abnormal    Troponin I, High Sensitivity 25 (*)     Narrative:     Less than 99th percentile of normal range cutoff-  Female and children under 18 years old <14 ng/L; Male <21 ng/L: Negative  Repeat testing should be performed if clinically indicated.     Female and children under 18 years old 14-50 ng/L; Male 21-50 ng/L:  Consistent with possible cardiac damage and possible increased clinical   risk. Serial measurements may help to assess extent of myocardial damage.     >50 ng/L: Consistent with cardiac damage, increased clinical risk and  myocardial infarction. Serial measurements may help assess extent of   myocardial damage.      NOTE: Children less than 1 year old may have higher baseline troponin   levels and results should be interpreted in conjunction with the overall   clinical context.     NOTE: Troponin I testing is performed using a different   testing methodology at [facility] than at other   [facility]. Direct result comparisons should only   be made within the same method.   B-TYPE NATRIURETIC PEPTIDE - Abnormal     (*)     Narrative:        <100 pg/mL - Heart failure unlikely  100-299 pg/mL - Intermediate probability of acute heart                  failure exacerbation. Correlate with clinical                  context and patient history.    >=300 pg/mL - Heart Failure likely. Correlate with clinical                  context and patient history.    BNP testing is performed using different testing methodology at [facility] than at other [facility]. Direct result comparisons should only be made within the same method.      PROTIME-INR - Abnormal    Protime 14.6 (*)     INR 1.3 (*)    LACTATE - Normal    Lactate 1.2      Narrative:     Venipuncture immediately after or during the administration of Metamizole may lead to falsely low results. Testing should be performed immediately  prior to Metamizole dosing.   URINALYSIS WITH REFLEX CULTURE AND MICROSCOPIC     Narrative:     The following orders were created for panel order Urinalysis with Reflex Culture and Microscopic.  Procedure                               Abnormality         Status                     ---------                               -----------         ------                     Urinalysis with Reflex C...[[id number]]                                                 Extra Urine Gray Tube[[id number]]                                                         Please view results for these tests on the individual orders.   URINALYSIS WITH REFLEX CULTURE AND MICROSCOPIC   EXTRA URINE GRAY TUBE   TROPONIN I, HIGH SENSITIVITY   MORPHOLOGY    RBC Morphology See Below      Polychromasia Mild      Ovalocytes Few      [place] Cells Few          Medical Decision Making  EKG interpreted by ED physician: Normal sinus rhythm rate of 61.  FL, QRS, QTc intervals within normal range.  No significant ST elevations or depressions.  No significant Q waves.  Good R wave progression.  Normal axis.  Findings of left ventricular hypertrophy.  T wave inversions in inferior lateral leads.  T wave inversions in inferior leads are unchanged from previous, but lateral leads appear to be new.  Patient previously atrial paced on EKG May 18, 2024.    63-year-old female presents emergency department with chief complaint of abnormal labs from care facility.  She also reports worsening lower extremity edema and open to leg wounds that are painful.  On my exam she is afebrile and nontoxic.  A thorough workup was obtained given her presenting symptoms.  Leg wounds are weeping and given the increased pain concerning for infection.  Patient covered with broad-spectrum antibiotics cefepime, Flagyl, and vancomycin for concern of wound infection.  Patient does not have findings consistent with sepsis.  CBC shows anemia that is downtrending when compared to previous.  Patient is on Eliquis and therefore I do not suspect blood clot in the legs causing her  symptoms.  Cardiac enzyme is elevated, but likely related to worsening kidney function.  CMP does show findings of acute kidney injury as well as dehydration and mild hypernatremia.  Patient given LR bolus.  BNP mildly elevated though nonspecific.  Magnesium is low and this is repleted via IV.  Chest x-ray shows no acute cardiopulmonary process such as pneumonia, pleural effusion, or pulmonary edema.  Given the patient's findings of acute kidney injury, hypomagnesemia, downtrending hemoglobin, and leg wounds with increasing pain I do feel she would benefit from admission for further evaluation and treatment.  Case is discussed with hospitalist on-call.  Patient treated symptomatically with fentanyl.  Advised patient of findings thus far and recommendation for admission and she is in agreement with this plan.       Diagnoses as of 06/04/24 1650   Acute kidney injury (CMS-[facility])   Open wound of lower extremity, unspecified laterality, initial encounter   Anemia, unspecified type   Hypomagnesemia   Dehydration   Cellulitis of leg without foot      1. Cellulitis of leg without foot        2. Acute kidney injury (CMS-[facility])        3. Open wound of lower extremity, unspecified laterality, initial encounter        4. Anemia, unspecified type        5. Hypomagnesemia        6. Dehydration           Procedures     This note was dictated using dragon software and may contain errors related to dictation interpretation errors.      Geovanny Mills, DO  06/04/24 1650

## 2024-06-04 NOTE — H&P
History Of Present Illness  Bing Holliday is a 63 y.o. female from a care facility with a past medical history of adrenocortical insufficiency, generalized weakness, hyperparathyroidism, lupus, diabetes, COPD, protein calorie malnutrition, anxiety, Raynaud's, iron deficiency anemia, rheumatoid arthritis, chronic kidney disease, depression, pulmonary hypertension, cardiomyopathy, paroxysmal A-fib on Eliquis, gout, fibromyalgia, hypertension, hyperlipidemia, who was sent for abnormal lab workup.  Patient also is complaining of worsening of bilateral lower extremity swelling and wounds and blisters for the past week.  Bowel movement and appetite is fine  Patient denies chest pain, abdominal pain, nausea, vomiting, diarrhea, constipation, bleeding, urinary symptoms, headache, dizziness, head trauma, LOC, SOB    ED course: /62, HR 73, RR 16, afebrile, O2 sat 94% on room air  Labs: Glucose 155, sodium 146, chloride 110, BUNs/creatinine 41/2.24, , magnesium 1.37, , troponin 25 elevated x 1, INR 1.3, hemoglobin 8.1, platelet 92, no leukocytosis  CXR: Clear  EKG: NSR, LA enlargement, LVH, T wave abnormality    Patient was admitted for management of abnormal lab workup and bilateral lower extremity swelling and blisters     Past Medical History  She has a past medical history of Abnormal kidney function (04/04/2023), Acute headache (03/10/2024), Acute low back pain (10/30/2023), Acute upper respiratory infection, unspecified (03/04/2020), Acute upper respiratory infection, unspecified (09/30/2015), Arthritis, Atypical facial pain (03/10/2024), Body mass index (BMI) 23.0-23.9, adult (10/15/2021), Body mass index (BMI) 33.0-33.9, adult (03/04/2020), Cardiomegaly (08/27/2013), Chest pain (06/10/2022), Chronic kidney disease, stage 3 unspecified (Multi) (07/02/2013), Confusional state (03/10/2024), Contact with and (suspected) exposure to covid-19 (04/04/2023), Delirium (03/10/2024), Disease of pericardium,  unspecified ([facility]) (07/02/2013), Encounter for follow-up examination after completed treatment for conditions other than malignant neoplasm (10/06/2022), Generalized contraction of visual field, right eye (01/29/2015), Hearing loss (03/10/2024), History of cataract (03/10/2024), History of thrombocytopenia (03/10/2024), Homonymous bilateral field defects, right side (04/29/2016), Hypertensive chronic kidney disease with stage 1 through stage 4 chronic kidney disease, or unspecified chronic kidney disease (07/02/2013), Laceration without foreign body, left foot, initial encounter (07/03/2018), Localized edema (03/10/2024), Mass of skin (03/10/2024), Migraine with aura, not intractable, without status migrainosus (10/24/2022), Open wound (03/10/2024), Open wound of left foot (03/10/2024), Other conditions influencing health status (07/02/2013), Other conditions influencing health status (07/02/2013), Other conditions influencing health status (07/02/2013), Other conditions influencing health status (05/22/2015), Other conditions influencing health status (10/24/2022), Other conditions influencing health status (03/14/2022), Other long term (current) drug therapy (10/24/2022), Overweight with body mass index (BMI) 25.0-29.9 (03/10/2024), Pain of toe (03/10/2024), Personal history of COVID-19 (04/04/2023), Personal history of diseases of the blood and blood-forming organs and certain disorders involving the immune mechanism (07/02/2013), Personal history of diseases of the skin and subcutaneous tissue (08/11/2015), Personal history of nephrotic syndrome (07/02/2013), Personal history of other diseases of the circulatory system (08/27/2013), Personal history of other diseases of the nervous system and sense organs, Personal history of other diseases of the respiratory system, Personal history of other infectious and parasitic diseases (07/02/2013), Personal history of other specified conditions, Personal history of  other specified conditions (08/27/2013), Posterior epistaxis (03/10/2024), Puckering of macula, right eye (10/24/2022), Raynaud's syndrome without gangrene (07/02/2013), Sinusitis (03/10/2024), Systemic lupus erythematosus, unspecified (Multi) (07/24/2015), Systemic lupus erythematosus, unspecified (Multi) (07/24/2015), Systemic lupus erythematosus, unspecified (Multi) (07/24/2015), 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) (07/02/2013), Type 2 diabetes mellitus with mild nonproliferative diabetic retinopathy without macular edema, left eye (Multi) (07/27/2015), Type 2 diabetes mellitus with mild nonproliferative diabetic retinopathy without macular edema, unspecified eye (Multi) (07/24/2015), Type 2 diabetes mellitus with proliferative diabetic retinopathy without macular edema, right eye (Multi) (07/27/2015), Type 2 diabetes mellitus with proliferative diabetic retinopathy without macular edema, unspecified eye (Multi) (07/24/2015), Unspecified acute and subacute iridocyclitis (07/24/2015), and Unspecified open wound, left foot, sequela (07/03/2018).    Surgical History  She has a past surgical history that includes Eye surgery (03/06/2015); Total hip arthroplasty (01/29/2015); Cholecystectomy (01/29/2015); Other surgical history (01/29/2015); Other surgical history (01/29/2015); Ankle surgery (01/29/2015); Foot surgery (01/29/2015); MR angio head wo IV contrast (7/26/2013); MR angio neck wo IV contrast (7/26/2013); CT guided percutaneous biopsy bone deep (5/4/2021); MR angio head wo IV contrast (9/17/2021); MR angio neck wo IV contrast (9/17/2021); MR angio neck wo IV contrast (3/25/2023); MR angio head wo IV contrast (3/25/2023); and Cardiac electrophysiology procedure (Left, 5/17/2024).     Social History  She reports that she has never smoked. She has never been exposed to tobacco smoke. She has never used smokeless tobacco. She reports that she does not currently use alcohol. She reports that  she does not use drugs.    Family History  Family History   Problem Relation Name Age of Onset    Other (RENAL DISEASE) Mother          END STAGE    Other (CARDIAC DISORDER) Mother      Cataracts Mother      Stroke Mother      Diabetes Mother      Kidney disease Mother      Lupus Mother      Other (CARDIAC DISORDER) Father      COPD Father      Glaucoma Father      Hypertension Father      Sleep apnea Father      Other (CARDIAC DISORDER) Sister      Depression Sister      Kidney disease Sister      Sickle cell trait Sister      Sleep apnea Daughter          Allergies  Ace inhibitors, Hydroxychloroquine, Lisinopril, Penicillins, and Sulfa (sulfonamide antibiotics)    Review of Systems  10 points ROS was negative except as mentioned per HPI     Physical Exam    General: Well-developed female, in no acute distress  HEENT: AT, NC, no JVD, no lymphadenopathy, neck supple  Lungs: Clear, no wheezing, no crackles  Cardiac: Normal S1-S2, no murmur, no gallop, pacemaker intact  Abdomen: Soft, nontender, no distention, positive bowel sound  Extremities: Bilateral lower extremity blisters noted, bilateral lower extremity 1+ nonpitting edema noted, bilateral hands and feet deformity noted  Neurological: Alert awake oriented x3, sensation intact, clear speech       Last Recorded Vitals  /62 (BP Location: Right arm, Patient Position: Lying)   Pulse 60   Temp 35.8 °C (96.4 °F) (Axillary)   Resp 16   Wt 95.3 kg (210 lb)   SpO2 94%        Assessment/Plan   Principal Problem:    Cellulitis of leg without foot    Bing Holliday is a 63 y.o. female from a care facility with a past medical history of adrenocortical insufficiency, generalized weakness, hyperparathyroidism, lupus, diabetes, COPD, protein calorie malnutrition, anxiety, Raynaud's, iron deficiency anemia, rheumatoid arthritis, chronic kidney disease, depression, pulmonary hypertension, cardiomyopathy, paroxysmal A-fib on Eliquis, gout, fibromyalgia, hypertension,  hyperlipidemia, who was admitted for management of abnormal lab workup and bilateral lower extremity wounds and swelling    Hypernatremia is chronic and actually it is showing some sort of improvement down from 149 3 weeks ago to 146 this admission.    Telemetry monitor, pain management, antiemetic, oxygen therapy as needed  Fall/Seizure precaution  Continue with IV antibiotic Levaquin for lower extremity wounds  Wound care consulted  Nephrology consulted for hypernatremia and JED/CKD  Monitor renal function, avoid nephrotoxic agent  Monitor CBC, transfuse to keep hemoglobin>7  Magnesium was replaced, monitor  Elevated Trope likely demand ischemia in the setting of JED, patient is asymptomatic, will monitor  ISS, hypoglycemia protocol, POC glucose, DM diet  Continue with home meds for other comorbidities  VTE/GI prophylaxis: Eliquis/Protonix  Disposition: TBD  Patient uses a walker for ambulation      Vivek Orozco MD

## 2024-06-05 ENCOUNTER — APPOINTMENT (OUTPATIENT)
Dept: RADIOLOGY | Facility: HOSPITAL | Age: 64
DRG: 602 | End: 2024-06-05
Payer: MEDICARE

## 2024-06-05 LAB
ANION GAP SERPL CALC-SCNC: 10 MMOL/L (ref 10–20)
BUN SERPL-MCNC: 37 MG/DL (ref 6–23)
CALCIUM SERPL-MCNC: 8.7 MG/DL (ref 8.6–10.3)
CHLORIDE SERPL-SCNC: 109 MMOL/L (ref 98–107)
CO2 SERPL-SCNC: 30 MMOL/L (ref 21–32)
CREAT SERPL-MCNC: 2.06 MG/DL (ref 0.5–1.05)
EGFRCR SERPLBLD CKD-EPI 2021: 27 ML/MIN/1.73M*2
ERYTHROCYTE [DISTWIDTH] IN BLOOD BY AUTOMATED COUNT: 19.7 % (ref 11.5–14.5)
EST. AVERAGE GLUCOSE BLD GHB EST-MCNC: 148 MG/DL
GLUCOSE BLD MANUAL STRIP-MCNC: 131 MG/DL (ref 74–99)
GLUCOSE BLD MANUAL STRIP-MCNC: 149 MG/DL (ref 74–99)
GLUCOSE BLD MANUAL STRIP-MCNC: 160 MG/DL (ref 74–99)
GLUCOSE BLD MANUAL STRIP-MCNC: 164 MG/DL (ref 74–99)
GLUCOSE BLD MANUAL STRIP-MCNC: 165 MG/DL (ref 74–99)
GLUCOSE SERPL-MCNC: 158 MG/DL (ref 74–99)
HBA1C MFR BLD: 6.8 %
HCT VFR BLD AUTO: 25.5 % (ref 36–46)
HGB BLD-MCNC: 7.7 G/DL (ref 12–16)
HOLD SPECIMEN: NORMAL
HOLD SPECIMEN: NORMAL
MAGNESIUM SERPL-MCNC: 1.78 MG/DL (ref 1.6–2.4)
MCH RBC QN AUTO: 30.1 PG (ref 26–34)
MCHC RBC AUTO-ENTMCNC: 30.2 G/DL (ref 32–36)
MCV RBC AUTO: 100 FL (ref 80–100)
NRBC BLD-RTO: 0 /100 WBCS (ref 0–0)
PLATELET # BLD AUTO: 85 X10*3/UL (ref 150–450)
POTASSIUM SERPL-SCNC: 3.3 MMOL/L (ref 3.5–5.3)
RBC # BLD AUTO: 2.56 X10*6/UL (ref 4–5.2)
SODIUM SERPL-SCNC: 146 MMOL/L (ref 136–145)
WBC # BLD AUTO: 3.6 X10*3/UL (ref 4.4–11.3)

## 2024-06-05 PROCEDURE — 73610 X-RAY EXAM OF ANKLE: CPT | Mod: BILATERAL PROCEDURE | Performed by: STUDENT IN AN ORGANIZED HEALTH CARE EDUCATION/TRAINING PROGRAM

## 2024-06-05 PROCEDURE — 36415 COLL VENOUS BLD VENIPUNCTURE: CPT | Performed by: STUDENT IN AN ORGANIZED HEALTH CARE EDUCATION/TRAINING PROGRAM

## 2024-06-05 PROCEDURE — 87040 BLOOD CULTURE FOR BACTERIA: CPT | Mod: ELYLAB | Performed by: STUDENT IN AN ORGANIZED HEALTH CARE EDUCATION/TRAINING PROGRAM

## 2024-06-05 PROCEDURE — 85027 COMPLETE CBC AUTOMATED: CPT | Performed by: STUDENT IN AN ORGANIZED HEALTH CARE EDUCATION/TRAINING PROGRAM

## 2024-06-05 PROCEDURE — 99232 SBSQ HOSP IP/OBS MODERATE 35: CPT | Performed by: STUDENT IN AN ORGANIZED HEALTH CARE EDUCATION/TRAINING PROGRAM

## 2024-06-05 PROCEDURE — 2500000002 HC RX 250 W HCPCS SELF ADMINISTERED DRUGS (ALT 637 FOR MEDICARE OP, ALT 636 FOR OP/ED): Performed by: STUDENT IN AN ORGANIZED HEALTH CARE EDUCATION/TRAINING PROGRAM

## 2024-06-05 PROCEDURE — 83735 ASSAY OF MAGNESIUM: CPT | Performed by: STUDENT IN AN ORGANIZED HEALTH CARE EDUCATION/TRAINING PROGRAM

## 2024-06-05 PROCEDURE — 73590 X-RAY EXAM OF LOWER LEG: CPT | Mod: 50

## 2024-06-05 PROCEDURE — 1210000001 HC SEMI-PRIVATE ROOM DAILY

## 2024-06-05 PROCEDURE — 82947 ASSAY GLUCOSE BLOOD QUANT: CPT | Mod: 91

## 2024-06-05 PROCEDURE — 2500000004 HC RX 250 GENERAL PHARMACY W/ HCPCS (ALT 636 FOR OP/ED)

## 2024-06-05 PROCEDURE — 73590 X-RAY EXAM OF LOWER LEG: CPT | Mod: BILATERAL PROCEDURE | Performed by: STUDENT IN AN ORGANIZED HEALTH CARE EDUCATION/TRAINING PROGRAM

## 2024-06-05 PROCEDURE — 80048 BASIC METABOLIC PNL TOTAL CA: CPT | Performed by: STUDENT IN AN ORGANIZED HEALTH CARE EDUCATION/TRAINING PROGRAM

## 2024-06-05 PROCEDURE — 2500000001 HC RX 250 WO HCPCS SELF ADMINISTERED DRUGS (ALT 637 FOR MEDICARE OP): Performed by: STUDENT IN AN ORGANIZED HEALTH CARE EDUCATION/TRAINING PROGRAM

## 2024-06-05 PROCEDURE — 73610 X-RAY EXAM OF ANKLE: CPT | Mod: 50

## 2024-06-05 PROCEDURE — 2500000004 HC RX 250 GENERAL PHARMACY W/ HCPCS (ALT 636 FOR OP/ED): Performed by: STUDENT IN AN ORGANIZED HEALTH CARE EDUCATION/TRAINING PROGRAM

## 2024-06-05 RX ORDER — MORPHINE SULFATE 2 MG/ML
2 INJECTION, SOLUTION INTRAMUSCULAR; INTRAVENOUS ONCE
Status: COMPLETED | OUTPATIENT
Start: 2024-06-05 | End: 2024-06-05

## 2024-06-05 RX ORDER — POTASSIUM CHLORIDE 20 MEQ/1
40 TABLET, EXTENDED RELEASE ORAL DAILY
Status: DISCONTINUED | OUTPATIENT
Start: 2024-06-05 | End: 2024-06-06

## 2024-06-05 RX ORDER — MAGNESIUM SULFATE HEPTAHYDRATE 40 MG/ML
2 INJECTION, SOLUTION INTRAVENOUS ONCE
Status: COMPLETED | OUTPATIENT
Start: 2024-06-05 | End: 2024-06-05

## 2024-06-05 RX ADMIN — MYCOPHENOLATE MOFETIL 1000 MG: 250 CAPSULE ORAL at 08:26

## 2024-06-05 RX ADMIN — TIOTROPIUM BROMIDE INHALATION SPRAY 2 PUFF: 3.12 SPRAY, METERED RESPIRATORY (INHALATION) at 06:38

## 2024-06-05 RX ADMIN — APIXABAN 2.5 MG: 5 TABLET, FILM COATED ORAL at 08:26

## 2024-06-05 RX ADMIN — Medication 5 MG: at 20:50

## 2024-06-05 RX ADMIN — LEVETIRACETAM 500 MG: 500 TABLET, FILM COATED ORAL at 04:47

## 2024-06-05 RX ADMIN — MYCOPHENOLATE MOFETIL 1000 MG: 250 CAPSULE ORAL at 00:09

## 2024-06-05 RX ADMIN — Medication 100 MG: at 08:25

## 2024-06-05 RX ADMIN — INSULIN LISPRO 2 UNITS: 100 INJECTION, SOLUTION INTRAVENOUS; SUBCUTANEOUS at 16:55

## 2024-06-05 RX ADMIN — FUROSEMIDE 40 MG: 10 INJECTION, SOLUTION INTRAMUSCULAR; INTRAVENOUS at 04:47

## 2024-06-05 RX ADMIN — MYCOPHENOLATE MOFETIL 1000 MG: 250 CAPSULE ORAL at 20:51

## 2024-06-05 RX ADMIN — OLANZAPINE 5 MG: 5 TABLET, FILM COATED ORAL at 20:50

## 2024-06-05 RX ADMIN — POTASSIUM CHLORIDE 40 MEQ: 1500 TABLET, EXTENDED RELEASE ORAL at 08:25

## 2024-06-05 RX ADMIN — APIXABAN 2.5 MG: 5 TABLET, FILM COATED ORAL at 20:50

## 2024-06-05 RX ADMIN — INSULIN GLARGINE 10 UNITS: 100 INJECTION, SOLUTION SUBCUTANEOUS at 08:32

## 2024-06-05 RX ADMIN — FOLIC ACID 1 MG: 1 TABLET ORAL at 08:25

## 2024-06-05 RX ADMIN — MORPHINE SULFATE 2 MG: 2 INJECTION, SOLUTION INTRAMUSCULAR; INTRAVENOUS at 20:50

## 2024-06-05 RX ADMIN — MAGNESIUM SULFATE HEPTAHYDRATE 2 G: 40 INJECTION, SOLUTION INTRAVENOUS at 12:47

## 2024-06-05 RX ADMIN — FLUTICASONE FUROATE AND VILANTEROL TRIFENATATE 1 PUFF: 200; 25 POWDER RESPIRATORY (INHALATION) at 06:38

## 2024-06-05 RX ADMIN — HYDROCODONE BITARTRATE AND ACETAMINOPHEN 1 TABLET: 5; 325 TABLET ORAL at 12:49

## 2024-06-05 RX ADMIN — METOPROLOL TARTRATE 25 MG: 25 TABLET, FILM COATED ORAL at 08:26

## 2024-06-05 RX ADMIN — CEFEPIME 1 G: 1 INJECTION, POWDER, FOR SOLUTION INTRAMUSCULAR; INTRAVENOUS at 18:33

## 2024-06-05 RX ADMIN — ATORVASTATIN CALCIUM 40 MG: 20 TABLET, FILM COATED ORAL at 20:50

## 2024-06-05 RX ADMIN — PANTOPRAZOLE SODIUM 40 MG: 40 TABLET, DELAYED RELEASE ORAL at 05:16

## 2024-06-05 RX ADMIN — LEVETIRACETAM 500 MG: 500 TABLET, FILM COATED ORAL at 16:11

## 2024-06-05 RX ADMIN — HYDROCORTISONE 20 MG: 5 TABLET ORAL at 08:25

## 2024-06-05 RX ADMIN — FUROSEMIDE 40 MG: 10 INJECTION, SOLUTION INTRAMUSCULAR; INTRAVENOUS at 16:10

## 2024-06-05 RX ADMIN — FLUDROCORTISONE ACETATE 0.1 MG: 0.1 TABLET ORAL at 08:26

## 2024-06-05 ASSESSMENT — COGNITIVE AND FUNCTIONAL STATUS - GENERAL
STANDING UP FROM CHAIR USING ARMS: A LITTLE
DRESSING REGULAR LOWER BODY CLOTHING: A LITTLE
STANDING UP FROM CHAIR USING ARMS: A LITTLE
DAILY ACTIVITIY SCORE: 24
MOBILITY SCORE: 19
CLIMB 3 TO 5 STEPS WITH RAILING: A LOT
DAILY ACTIVITIY SCORE: 18
EATING MEALS: A LITTLE
DRESSING REGULAR UPPER BODY CLOTHING: A LITTLE
MOVING TO AND FROM BED TO CHAIR: A LITTLE
CLIMB 3 TO 5 STEPS WITH RAILING: A LOT
PERSONAL GROOMING: A LITTLE
MOBILITY SCORE: 18
WALKING IN HOSPITAL ROOM: A LITTLE
HELP NEEDED FOR BATHING: A LITTLE
MOVING TO AND FROM BED TO CHAIR: A LITTLE
TOILETING: A LITTLE
WALKING IN HOSPITAL ROOM: A LOT

## 2024-06-05 ASSESSMENT — PAIN DESCRIPTION - LOCATION
LOCATION: LEG
LOCATION: LEG

## 2024-06-05 ASSESSMENT — PAIN - FUNCTIONAL ASSESSMENT
PAIN_FUNCTIONAL_ASSESSMENT: 0-10

## 2024-06-05 ASSESSMENT — PAIN SCALES - GENERAL
PAINLEVEL_OUTOF10: 6
PAINLEVEL_OUTOF10: 10 - WORST POSSIBLE PAIN
PAINLEVEL_OUTOF10: 2

## 2024-06-05 ASSESSMENT — PAIN DESCRIPTION - ORIENTATION
ORIENTATION: RIGHT;LEFT
ORIENTATION: LEFT;RIGHT

## 2024-06-05 NOTE — CONSULTS
Removed dressings from BLE. Open wounds seen which seem to indicate vascular wounds. Wound beds are pink with partial and full thickness skin loss. There is small amount of straw color drainage. Localized edema present, Pedal pulses BLE present 2+. Applied xeroform dressing and secured with Vinod. Will await further orders/recommendations from Dr. Rodriguez for wound care.  Wound Care Consult     Visit Date: 6/5/2024      Patient Name: Bing Holliday         MRN: [id number]           YOB: 1960     Reason for Consult: BLE Wounds        Wound History: Non Healing/3 weeks per pt.     Pertinent Labs:   Albumin   Date Value Ref Range Status   06/04/2024 3.0 (L) 3.4 - 5.0 g/dL Final   04/29/2021 3.1 (L) 3.4 - 5.0 g/dL Final     Albumin, CSF   Date Value Ref Range Status   01/18/2024 25 0 - 35 mg/dL Final     Albumin Index   Date Value Ref Range Status   01/18/2024 10.7 (H) 0.0 - 9.0 ratio Final     Albumin, Serum   Date Value Ref Range Status   01/18/2024 2337 (L) 3500 - 5200 mg/dL Final       Wound Assessment:  Wound 04/24/24 Pressure Injury Buttocks Right (Active)       Wound 05/10/24 Other (comment) Back Lower;Medial (Active)   Wound Image   06/05/24 0048       Wound 05/11/24 Skin Tear Leg Dorsal;Left;Proximal;Upper (Active)       Wound 06/05/24 Pretibial Distal;Left (Active)   Wound Image   06/05/24 0050       Wound 06/05/24 Pretibial Right (Active)   Wound Image   06/05/24 0051       Wound Team Summary Assessment:       Wound Team Plan: Continue with current dressing changes until seen by Dr. Rodriguez.     Kilo Munguia LPN  6/5/2024  4:09 PM

## 2024-06-05 NOTE — PROGRESS NOTES
Bing Holliday is a 63 y.o. female on day 1 of admission presenting with Cellulitis of leg without foot.      Subjective   Patient stable, in no acute distress, bilateral lower extremity wounds has yellowish drainage, wound care following  Potassium magnesium was replaced, will monitor  Nephro following  Pending cultures    Objective     Last Recorded Vitals  BP (!) 156/92   Pulse 61   Temp 35.3 °C (95.5 °F)   Resp 18   Wt 95.3 kg (210 lb)   SpO2 96%   Intake/Output last 3 Shifts:    Intake/Output Summary (Last 24 hours) at 6/5/2024 1202  Last data filed at 6/5/2024 0858  Gross per 24 hour   Intake --   Output 1600 ml   Net -1600 ml       Admission Weight  Weight: 95.3 kg (210 lb) (06/04/24 1313)    Daily Weight  06/04/24 : 95.3 kg (210 lb)    Image Results  ECG 12 lead  Normal sinus rhythm  Possible Left atrial enlargement  Left ventricular hypertrophy  T wave abnormality, consider inferior ischemia  Abnormal ECG  When compared with ECG of 18-MAY-2024 06:51,  Sinus rhythm has replaced Electronic atrial pacemaker  Nonspecific T wave abnormality, worse in Anterolateral leads  See ED provider note for full interpretation and clinical correlation  XR chest 1 view  Narrative: Interpreted By:  Nic Moseley,   STUDY:  XR CHEST 1 VIEW  6/4/2024 2:24 pm      INDICATION:  Signs/Symptoms:peripheral edema, weakness      COMPARISON:  05/17/2024      ACCESSION NUMBER(S):  [id number]      ORDERING CLINICIAN:  CHAVO KIM      TECHNIQUE:  A single AP portable radiograph of the chest was obtained.      FINDINGS:  Multiple cardiac monitoring leads are seen over the chest.  A 2 lead  pacer is seen over the left chest. No focal infiltrate, pleural  effusion or pneumothorax is identified. The cardiac silhouette is  mildly prominent, similar to prior studies.      Impression: No focal infiltrate or pneumothorax is identified.      MACRO:  None.      Signed by: Nic Moseley 6/4/2024 2:39 PM  Dictation workstation:    [id number]      Physical Exam    General: Well-developed female, in no acute distress  HEENT: AT, NC, no JVD, no lymphadenopathy, neck supple  Lungs: Clear, no wheezing, no crackles  Cardiac: Normal S1-S2, no murmur, no gallop, pacemaker intact  Abdomen: Soft, nontender, no distention, positive bowel sound  Extremities: Bilateral lower extremity blisters noted, bilateral lower extremity 1+ nonpitting edema noted, bilateral hands and feet deformity noted  Neurological: Alert awake oriented x3, sensation intact, clear speech      Assessment/Plan      Principal Problem:    Cellulitis of leg without foot    Bing Holliday is a 63 y.o. female from a care facility with a past medical history of adrenocortical insufficiency, generalized weakness, hyperparathyroidism, lupus, diabetes, COPD, protein calorie malnutrition, anxiety, Raynaud's, iron deficiency anemia, rheumatoid arthritis, chronic kidney disease, depression, pulmonary hypertension, cardiomyopathy, paroxysmal A-fib on Eliquis, gout, fibromyalgia, hypertension, hyperlipidemia, who was admitted for management of abnormal lab workup and bilateral lower extremity wounds and swelling     Hypernatremia is chronic and actually it is showing some sort of improvement down from 149 3 weeks ago to 146 this admission.     Telemetry monitor, pain management, antiemetic, oxygen therapy as needed  Fall/Seizure precaution  Continue with IV antibiotic cefepime for lower extremity wounds  Follow-up cultures  Wound care consulted  Nephrology recs appreciated, cw current management  Monitor renal function, avoid nephrotoxic agent  Monitor CBC, transfuse to keep hemoglobin>7  Magnesium and potassium was replaced, will monitor  Elevated Trope likely demand ischemia in the setting of JED, patient is asymptomatic, will monitor  ISS, hypoglycemia protocol, POC glucose, DM diet  Continue with home meds for other comorbidities  VTE/GI prophylaxis: Eliquis/Protonix  Disposition: Back to  the care facility once hemodynamically stable  Patient uses a walker for ambulation    Vivek Orozco MD

## 2024-06-05 NOTE — CONSULTS
[facility]  Consult Note           Reason for Consult:  hypernatremia, JED/CKD  Requesting Physician:  Dr. Orozco     Chief Complaint:  abnormal labs   History Obtained From:  patient, electronic medical record    History of Present Ilness:    63 y.o. female with history s/f T2DM, SLE, COPD, adrenal insufficiency, Raynauds, RA, CKD stage III, depression, pHTN, pA.fib, gout, fibromyalgia, HTN and HLD who presented for abnormal labs. Notably has also been dealing w/ worsened BLE swelling c/b wounds/blisters over the past week. On presentation BP on the lower side at 105/62 but has since improved. Scr was 2.24 now down to  2 w/ Na 146.     Past Medical History:    Past Medical History:   Diagnosis Date    Abnormal kidney function 04/04/2023    Acute headache 03/10/2024    Acute low back pain 10/30/2023    Acute upper respiratory infection, unspecified 03/04/2020    Acute URI    Acute upper respiratory infection, unspecified 09/30/2015    URTI (acute upper respiratory infection)    Arthritis     Atypical facial pain 03/10/2024    Body mass index (BMI) 23.0-23.9, adult 10/15/2021    BMI 23.0-23.9, adult    Body mass index (BMI) 33.0-33.9, adult 03/04/2020    BMI 33.0-33.9,adult    Cardiomegaly 08/27/2013    Left ventricular hypertrophy    Chest pain 06/10/2022    Comment on above: Added by Problem List Migration; 2013-3-12; Moved to Suppressed Nov 25 2013 9:16PM; Comment on above: Added by Problem List Migration; 2013-3-12; Moved to Suppressed Nov 25 2013 9:16PM;    Chronic kidney disease, stage 3 unspecified (Multi) 07/02/2013    Chronic kidney disease, stage III (moderate)    Confusional state 03/10/2024    Contact with and (suspected) exposure to covid-19 04/04/2023    Delirium 03/10/2024    Disease of pericardium, unspecified ([facility]) 07/02/2013    Pericardial disease    Encounter for follow-up examination after completed treatment for conditions other than malignant neoplasm 10/06/2022    [facility]  discharge follow-up    Generalized contraction of visual field, right eye 01/29/2015    Generalized contraction of visual field of right eye    Hearing loss 03/10/2024    History of cataract 03/10/2024    History of thrombocytopenia 03/10/2024    Comment on above: Added by Problem List Migration; 2013-7-2;    Homonymous bilateral field defects, right side 04/29/2016    Homonymous bilateral field defects of right side    Hypertensive chronic kidney disease with stage 1 through stage 4 chronic kidney disease, or unspecified chronic kidney disease 07/02/2013    Nephrosclerosis    Laceration without foreign body, left foot, initial encounter 07/03/2018    Foot laceration, left, initial encounter    Localized edema 03/10/2024    Mass of skin 03/10/2024    Migraine with aura, not intractable, without status migrainosus 10/24/2022    Ocular migraine    Open wound 03/10/2024    Open wound of left foot 03/10/2024    Other conditions influencing health status 07/02/2013    Chronic Glomerulonephritis In Diseases Classified Elsewhere    Other conditions influencing health status 07/02/2013    Progressive Familial Myoclonic Epilepsy    Other conditions influencing health status 07/02/2013    Protein S Deficiency    Other conditions influencing health status 05/22/2015    Familial Combined Hyperlipidemia    Other conditions influencing health status 10/24/2022    IDDM (insulin dependent diabetes mellitus)    Other conditions influencing health status 03/14/2022    Diabetes mellitus, insulin dependent (IDDM), uncontrolled    Other long term (current) drug therapy 10/24/2022    Long-term use of Plaquenil    Overweight with body mass index (BMI) 25.0-29.9 03/10/2024    Pain of toe 03/10/2024    Personal history of COVID-19 04/04/2023    Personal history of diseases of the blood and blood-forming organs and certain disorders involving the immune mechanism 07/02/2013    History of thrombocytopenia    Personal history of diseases of  the skin and subcutaneous tissue 08/11/2015    History of foot ulcer    Personal history of nephrotic syndrome 07/02/2013    History of nephrotic syndrome    Personal history of other diseases of the circulatory system 08/27/2013    History of sinus tachycardia    Personal history of other diseases of the nervous system and sense organs     History of cataract    Personal history of other diseases of the respiratory system     History of bronchitis    Personal history of other infectious and parasitic diseases 07/02/2013    History of hepatitis    Personal history of other specified conditions     History of shortness of breath    Personal history of other specified conditions 08/27/2013    History of edema    Posterior epistaxis 03/10/2024    Puckering of macula, right eye 10/24/2022    ERM OD (epiretinal membrane, right eye)    Raynaud's syndrome without gangrene 07/02/2013    Raynaud's disease    Sinusitis 03/10/2024    Systemic lupus erythematosus, unspecified (Multi) 07/24/2015    SLE (systemic lupus erythematosus)    Systemic lupus erythematosus, unspecified (Multi) 07/24/2015    SLE (systemic lupus erythematosus)    Systemic lupus erythematosus, unspecified (Multi) 07/24/2015    Systemic lupus    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) 07/02/2013    Type 2 diabetes with nephropathy    Type 2 diabetes mellitus with mild nonproliferative diabetic retinopathy without macular edema, left eye (Multi) 07/27/2015    Non-proliferative diabetic retinopathy, left eye    Type 2 diabetes mellitus with mild nonproliferative diabetic retinopathy without macular edema, unspecified eye (Multi) 07/24/2015    Mild non proliferative diabetic retinopathy    Type 2 diabetes mellitus with proliferative diabetic retinopathy without macular edema, right eye (Multi) 07/27/2015    Proliferative diabetic retinopathy of right eye    Type 2 diabetes mellitus with proliferative diabetic retinopathy without macular  edema, unspecified eye (Multi) 07/24/2015    Diabetic proliferative retinopathy    Unspecified acute and subacute iridocyclitis 07/24/2015    Acute iritis, right eye    Unspecified open wound, left foot, sequela 07/03/2018    Wound, open, foot, left, sequela       Past Surgical History:    Past Surgical History:   Procedure Laterality Date    ANKLE SURGERY  01/29/2015    Ankle Surgery    CARDIAC ELECTROPHYSIOLOGY PROCEDURE Left 5/17/2024    Procedure: PPM IMPLANT DUAL;  Surgeon: Antonio Rodriges MD;  Location: [facility];  Service: Electrophysiology;  Laterality: Left;  Pt. needs platelets and Prednisone prior to procedure    CHOLECYSTECTOMY  01/29/2015    Cholecystectomy    CT GUIDED PERCUTANEOUS BIOPSY BONE DEEP  5/4/2021    CT GUIDED PERCUTANEOUS BIOPSY BONE DEEP 5/4/2021 [facility] CL[facility]ICAL [facility]GAC[facility]    EYE SURGERY  03/06/2015    Eye Surgery    FOOT SURGERY  01/29/2015    Foot Surgery    MR HEAD ANGIO WO IV CONTRAST  7/26/2013    MR HEAD ANGIO WO IV CONTRAST 7/26/2013 [facility] CLINICAL LEGACY    MR HEAD ANGIO WO IV CONTRAST  9/17/2021    MR HEAD ANGIO WO IV CONTRAST 9/17/2021 [facility]    MR HEAD ANGIO WO IV CONTRAST  3/25/2023    MR HEAD ANGIO WO IV CONTRAST [facility]    MR NECK ANGIO WO IV CONTRAST  7/26/2013    MR NECK ANGIO WO IV CONTRAST 7/26/2013 [facility] CLINICAL LEGACY    MR NECK ANGIO WO IV CONTRAST  9/17/2021    MR NECK ANGIO WO IV CONTRAST 9/17/2021 [facility]    MR NECK ANGIO WO IV CONTRAST  3/25/2023    MR NECK ANGIO WO IV CONTRAST [facility]    OTHER SURGICAL HISTORY  01/29/2015    Creation Of Pericardial Window    OTHER SURGICAL HISTORY  01/29/2015    Quadricepsplasty    TOTAL HIP ARTHROPLASTY  01/29/2015    Hip Replacement       Home Medications:    No current facility-administered medications on file prior to encounter.     Current Outpatient Medications on File Prior to Encounter   Medication Sig Dispense Refill    apixaban (Eliquis) 2.5 mg tablet Take 1 tablet (2.5 mg) by mouth 2 times  a day. 60 tablet 1    atorvastatin (Lipitor) 40 mg tablet Take 1 tablet (40 mg) by mouth once daily. (Patient taking differently: Take 1 tablet (40 mg) by mouth once daily at bedtime.) 90 tablet 3    fludrocortisone (Florinef) 0.1 mg tablet Take 1 tablet (0.1 mg) by mouth every other day.      fluticasone-umeclidin-vilanter (TRELEGY-ELLIPTA) 200-62.5-25 mcg blister with device Inhale 1 puff once daily. 60 each 5    folic acid (Folvite) 1 mg tablet TAKE 1 TABLET BY MOUTH EVERY DAY 90 tablet 1    furosemide (Lasix) 20 mg tablet Take 2 tablets (40 mg) by mouth once daily.      guaiFENesin (Mucinex) 600 mg 12 hr tablet Take 2 tablets (1,200 mg) by mouth 2 times a day. Do not crush, chew, or split.      hydrocortisone (Cortef) 10 mg tablet Take 1 tablet (10 mg) by mouth once daily at bedtime.      hydrocortisone (Cortef) 20 mg tablet Take 1 tablet (20 mg) by mouth once daily.      insulin glargine (Lantus U-100 Insulin) 100 unit/mL injection Inject 10 Units under the skin once daily in the morning. Take as directed per insulin instructions.      insulin lispro (HumaLOG) 100 unit/mL injection Inject under the skin 3 times a day as needed for high blood sugar (before meals). Take as directed per insulin Sliding Scale instructions.  0-150 = 0 units  151-200 = 2 units  201-250 = 4 units  251-300 = 6 units  301-350 = 8 units  351-400 = 10 units  >400 = give 10 units and contact MD      levETIRAcetam (Keppra) 500 mg tablet Take 1 tablet (500 mg) by mouth every 12 hours. 60 tablet 0    levoFLOXacin (Levaquin) 500 mg tablet Take 1 tablet (500 mg) by mouth once daily. X 10 days      [] magnesium oxide (Mag-Ox) 400 mg (241.3 mg magnesium) tablet Take 2 tablets (800 mg) by mouth once daily. Do not fill before May 1, 2024.      melatonin 5 mg tablet Take 1 tablet (5 mg) by mouth once daily at bedtime.      metoprolol tartrate (Lopressor) 25 mg tablet Take 1 tablet (25 mg) by mouth 2 times a day.      multivitamin with  "minerals tablet Take 1 tablet by mouth once daily.      mycophenolate (Cellcept) 500 mg tablet TAKE 2 TABLETS BY MOUTH TWICE A  tablet 0    OLANZapine (ZyPREXA) 7.5 mg tablet Take 1 tablet (7.5 mg) by mouth once daily at bedtime.      pantoprazole (ProtoNix) 40 mg EC tablet TAKE 1 TABLET BY MOUTH EVERY DAY 90 tablet 1    potassium chloride CR 20 mEq ER tablet Take 1 tablet (20 mEq) by mouth once daily. Do not crush or chew.      thiamine 100 mg tablet Take 1 tablet (100 mg) by mouth once daily.      acetaminophen (Tylenol) 325 mg tablet Take 2 tablets (650 mg) by mouth every 4 hours if needed for mild pain (1 - 3), moderate pain (4 - 6) or fever (temp greater than 38.0 C).      albuterol 90 mcg/actuation inhaler Inhale 2 puffs every 6 hours if needed for shortness of breath or wheezing.      atovaquone (Mepron) 750 mg/5 mL suspension Take 10 mL (1,500 mg) by mouth once daily.      balsam peru-castor oiL (Venelex) ointment Apply topically 2 times a day. APPLY TO BUTTOCKS, SACRUM, AND THIGHS.      blood-glucose sensor (Dexcom G6 Sensor) device Use to check sugars 3 times daily 4 each 2    Dexcom G4 platinum  (Dexcom G6 ) misc Use as instructed 1 each 0    Dexcom G4 platinum transmitter (Dexcom G6 Transmitter) device Use as instructed 1 each 0    glucagon HCL (Glucagon, HCl, Emergency Kit) 1 mg recon soln Inject 1 mg into the muscle if needed.      meclizine (Antivert) 25 mg tablet Take 1 tablet (25 mg) by mouth every 12 hours if needed for dizziness.      nut.tx.gluc intol,lf,soy/fiber (BOOST GLUCOSE CONTROL ORAL) Take 8 fluid ounces by mouth once daily in the morning.      OLANZapine (ZyPREXA) 5 mg tablet Take 1 tablet (5 mg) by mouth once daily at bedtime.      pen needle, diabetic 31 gauge x 5/16\" needle Use to inject 1-4 times daily as directed. 100 each 11       Allergies:  Ace inhibitors, Hydroxychloroquine, Lisinopril, Penicillins, and Sulfa (sulfonamide antibiotics)    Social History:  "   Social History     Socioeconomic History    Marital status:      Spouse name: Not on file    Number of children: Not on file    Years of education: Not on file    Highest education level: Not on file   Occupational History    Not on file   Tobacco Use    Smoking status: Never     Passive exposure: Never    Smokeless tobacco: Never   Vaping Use    Vaping status: Never Used   Substance and Sexual Activity    Alcohol use: Not Currently     Comment: RARE    Drug use: Never    Sexual activity: Defer   Other Topics Concern    Not on file   Social History Narrative    Not on file     Social Determinants of Health     Financial Resource Strain: Low Risk  (6/4/2024)    Overall Financial Resource Strain (CARDIA)     Difficulty of Paying Living Expenses: Not very hard   Food Insecurity: Patient Unable To Answer (3/9/2024)    Hunger Vital Sign     Worried About Running Out of Food in the Last Year: Patient unable to answer     Ran Out of Food in the Last Year: Patient unable to answer   Transportation Needs: No Transportation Needs (6/4/2024)    PRAPARE - Transportation     Lack of Transportation (Medical): No     Lack of Transportation (Non-Medical): No   Physical Activity: Patient Declined (1/15/2024)    Exercise Vital Sign     Days of Exercise per Week: Patient declined     Minutes of Exercise per Session: Patient declined   Stress: No Stress Concern Present (1/15/2024)    [employer] [place] - Occupational Stress Questionnaire     Feeling of Stress : Not at all   Social Connections: Unknown (1/15/2024)    Social Connection and Isolation Panel [NHANES]     Frequency of Communication with Friends and Family: More than three times a week     Frequency of Social Gatherings with Friends and Family: More than three times a week     Attends Gnosticism Services: Patient declined     Active Member of Clubs or Organizations: Patient declined     Attends Club or Organization Meetings: Patient declined      "Marital Status: Patient unable to answer   Intimate Partner Violence: Not At Risk (1/15/2024)    Humiliation, Afraid, Rape, and Kick questionnaire     Fear of Current or Ex-Partner: No     Emotionally Abused: No     Physically Abused: No     Sexually Abused: No   Housing Stability: High Risk (6/4/2024)    Housing Stability Vital Sign     Unable to Pay for Housing in the Last Year: No     Number of Places Lived in the Last Year: 3     Unstable Housing in the Last Year: No       Family History:   Family History   Problem Relation Name Age of Onset    Other (RENAL DISEASE) Mother          END STAGE    Other (CARDIAC DISORDER) Mother      Cataracts Mother      Stroke Mother      Diabetes Mother      Kidney disease Mother      Lupus Mother      Other (CARDIAC DISORDER) Father      COPD Father      Glaucoma Father      Hypertension Father      Sleep apnea Father      Other (CARDIAC DISORDER) Sister      Depression Sister      Kidney disease Sister      Sickle cell trait Sister      Sleep apnea Daughter         Review of Systems:   Positives in bold  Constitutional: fever, chills, fatigue, malaise   HENT:  rhinorrhea, sinus pain, sore throat, epistaxis    Eyes:  photophobia, visual disturbance, eye redness  Respiratory: shortness of breath, cough, hemoptysis    Cardiovascular: chest pain, palpitations, orthopnea, leg swelling   Gastrointestinal: abdominal pain, nausea, vomiting, diarrhea, constipation   Endocrine: polydipsia, polyphagia, cold intolerance, heat intolerance  Genitourinary: dysuria, flank pain, frequency, urgency   Hematologic: easy bruising, easy bleeding  Musculoskeletal: back pain, neck pain, myalgias, arthalgias  Neurological: syncope, lightheadedness, dizziness, seizures, tremors, weakness  Psychiatric/Behavioral: anxiety, depression, hallucinations  Skin: LE wounds    Physical exam:   Constitutional:  VITALS:  BP (!) 156/92   Pulse 61   Temp 35.3 °C (95.5 °F)   Resp 18   Ht 1.778 m (5' 10\")   Wt " 95.3 kg (210 lb)   LMP  (LMP Unknown)   SpO2 96%   BMI 30.13 kg/m²     General: alert, in no apparent distress  HEENT: normocephalic, atraumatic, anicteric  Neck: supple, no mass  Lungs: non-labored respirations, clear to auscultation bilaterally  Heart: regular rate and rhythm, no murmurs or rubs  Abdomen: soft, non-tender, non-distended  MSK: no joint swelling or tenderness  Ext: no cyanosis, ++ BLE peripheral edema  Neuro: alert and oriented, no gross abnormalities  Psych: normal mood and affect  Skin: multiple LE open skin blisters    Data/  CBC:   Lab Results   Component Value Date    WBC 3.6 (L) 06/05/2024    RBC 2.56 (L) 06/05/2024    HGB 7.7 (L) 06/05/2024    HCT 25.5 (L) 06/05/2024     06/05/2024    MCH 30.1 06/05/2024    MCHC 30.2 (L) 06/05/2024    RDW 19.7 (H) 06/05/2024    PLT 85 (L) 06/05/2024     BMP:    Lab Results   Component Value Date     (H) 06/05/2024    K 3.3 (L) 06/05/2024     (H) 06/05/2024    CO2 30 06/05/2024    BUN 37 (H) 06/05/2024    CREATININE 2.06 (H) 06/05/2024    CALCIUM 8.7 06/05/2024    GLUCOSE 158 (H) 06/05/2024     Assessment:  63 y.o. female with history s/f T2DM, SLE, COPD, adrenal insufficiency, Raynauds, RA, CKD stage III, depression, pHTN, pA.fib, gout, fibromyalgia, HTN and HLD who presented for abnormal labs.    JED on CKD stage III: ? If in setting of CRS as function improving w/ diuresis, CKD risk factors 2/2 T2DM, HTN, unlikely in setting of SLE, , baseline Scr ~1.3-1.4 w/ eGFR low/mid 40s  Fluid overload   Hypomagnesemia   Hypernatremia   Hypokalemia    Plan:  - continue lasix 40 mg IV BID   - advised to hydrate more   - replete K, Mg     Thank you for the consultation. Will continue to follow  Please do not hesitate to call with questions.    Federico Chowdhury MD

## 2024-06-06 ENCOUNTER — PATIENT OUTREACH (OUTPATIENT)
Dept: PRIMARY CARE | Facility: CLINIC | Age: 64
End: 2024-06-06
Payer: MEDICARE

## 2024-06-06 DIAGNOSIS — M32.14 LUPUS NEPHRITIS (MULTI): ICD-10-CM

## 2024-06-06 DIAGNOSIS — E11.42 DM TYPE 2 WITH DIABETIC PERIPHERAL NEUROPATHY (MULTI): ICD-10-CM

## 2024-06-06 DIAGNOSIS — N18.31 STAGE 3A CHRONIC KIDNEY DISEASE (MULTI): ICD-10-CM

## 2024-06-06 LAB
ANION GAP SERPL CALC-SCNC: 12 MMOL/L (ref 10–20)
BACTERIA UR CULT: NORMAL
BUN SERPL-MCNC: 40 MG/DL (ref 6–23)
CALCIUM SERPL-MCNC: 9.1 MG/DL (ref 8.6–10.3)
CHLORIDE SERPL-SCNC: 106 MMOL/L (ref 98–107)
CO2 SERPL-SCNC: 31 MMOL/L (ref 21–32)
CREAT SERPL-MCNC: 2 MG/DL (ref 0.5–1.05)
EGFRCR SERPLBLD CKD-EPI 2021: 28 ML/MIN/1.73M*2
ERYTHROCYTE [DISTWIDTH] IN BLOOD BY AUTOMATED COUNT: 19.7 % (ref 11.5–14.5)
GLUCOSE BLD MANUAL STRIP-MCNC: 137 MG/DL (ref 74–99)
GLUCOSE BLD MANUAL STRIP-MCNC: 154 MG/DL (ref 74–99)
GLUCOSE BLD MANUAL STRIP-MCNC: 261 MG/DL (ref 74–99)
GLUCOSE BLD MANUAL STRIP-MCNC: 56 MG/DL (ref 74–99)
GLUCOSE BLD MANUAL STRIP-MCNC: 62 MG/DL (ref 74–99)
GLUCOSE BLD MANUAL STRIP-MCNC: 65 MG/DL (ref 74–99)
GLUCOSE BLD MANUAL STRIP-MCNC: 76 MG/DL (ref 74–99)
GLUCOSE BLD MANUAL STRIP-MCNC: 85 MG/DL (ref 74–99)
GLUCOSE BLD MANUAL STRIP-MCNC: 92 MG/DL (ref 74–99)
GLUCOSE SERPL-MCNC: 72 MG/DL (ref 74–99)
HCT VFR BLD AUTO: 30.2 % (ref 36–46)
HGB BLD-MCNC: 9 G/DL (ref 12–16)
HOLD SPECIMEN: NORMAL
MAGNESIUM SERPL-MCNC: 2.14 MG/DL (ref 1.6–2.4)
MCH RBC QN AUTO: 29.9 PG (ref 26–34)
MCHC RBC AUTO-ENTMCNC: 29.8 G/DL (ref 32–36)
MCV RBC AUTO: 100 FL (ref 80–100)
NRBC BLD-RTO: 0 /100 WBCS (ref 0–0)
PLATELET # BLD AUTO: 78 X10*3/UL (ref 150–450)
POTASSIUM SERPL-SCNC: 4 MMOL/L (ref 3.5–5.3)
RBC # BLD AUTO: 3.01 X10*6/UL (ref 4–5.2)
SODIUM SERPL-SCNC: 145 MMOL/L (ref 136–145)
WBC # BLD AUTO: 3.7 X10*3/UL (ref 4.4–11.3)

## 2024-06-06 PROCEDURE — 99232 SBSQ HOSP IP/OBS MODERATE 35: CPT | Performed by: STUDENT IN AN ORGANIZED HEALTH CARE EDUCATION/TRAINING PROGRAM

## 2024-06-06 PROCEDURE — 2500000002 HC RX 250 W HCPCS SELF ADMINISTERED DRUGS (ALT 637 FOR MEDICARE OP, ALT 636 FOR OP/ED): Mod: MUE | Performed by: STUDENT IN AN ORGANIZED HEALTH CARE EDUCATION/TRAINING PROGRAM

## 2024-06-06 PROCEDURE — 36415 COLL VENOUS BLD VENIPUNCTURE: CPT | Performed by: STUDENT IN AN ORGANIZED HEALTH CARE EDUCATION/TRAINING PROGRAM

## 2024-06-06 PROCEDURE — 80048 BASIC METABOLIC PNL TOTAL CA: CPT | Performed by: STUDENT IN AN ORGANIZED HEALTH CARE EDUCATION/TRAINING PROGRAM

## 2024-06-06 PROCEDURE — 82947 ASSAY GLUCOSE BLOOD QUANT: CPT | Mod: 91

## 2024-06-06 PROCEDURE — 2500000001 HC RX 250 WO HCPCS SELF ADMINISTERED DRUGS (ALT 637 FOR MEDICARE OP): Performed by: STUDENT IN AN ORGANIZED HEALTH CARE EDUCATION/TRAINING PROGRAM

## 2024-06-06 PROCEDURE — 83735 ASSAY OF MAGNESIUM: CPT | Performed by: STUDENT IN AN ORGANIZED HEALTH CARE EDUCATION/TRAINING PROGRAM

## 2024-06-06 PROCEDURE — 2500000004 HC RX 250 GENERAL PHARMACY W/ HCPCS (ALT 636 FOR OP/ED): Performed by: STUDENT IN AN ORGANIZED HEALTH CARE EDUCATION/TRAINING PROGRAM

## 2024-06-06 PROCEDURE — 85027 COMPLETE CBC AUTOMATED: CPT | Performed by: STUDENT IN AN ORGANIZED HEALTH CARE EDUCATION/TRAINING PROGRAM

## 2024-06-06 PROCEDURE — 1210000001 HC SEMI-PRIVATE ROOM DAILY

## 2024-06-06 RX ADMIN — APIXABAN 2.5 MG: 5 TABLET, FILM COATED ORAL at 08:35

## 2024-06-06 RX ADMIN — HYDROCODONE BITARTRATE AND ACETAMINOPHEN 1 TABLET: 5; 325 TABLET ORAL at 00:23

## 2024-06-06 RX ADMIN — FLUTICASONE FUROATE AND VILANTEROL TRIFENATATE 1 PUFF: 200; 25 POWDER RESPIRATORY (INHALATION) at 06:02

## 2024-06-06 RX ADMIN — CEFEPIME 1 G: 1 INJECTION, POWDER, FOR SOLUTION INTRAMUSCULAR; INTRAVENOUS at 08:54

## 2024-06-06 RX ADMIN — MYCOPHENOLATE MOFETIL 1000 MG: 250 CAPSULE ORAL at 20:59

## 2024-06-06 RX ADMIN — FOLIC ACID 1 MG: 1 TABLET ORAL at 08:35

## 2024-06-06 RX ADMIN — Medication 100 MG: at 08:35

## 2024-06-06 RX ADMIN — METOPROLOL TARTRATE 25 MG: 25 TABLET, FILM COATED ORAL at 08:46

## 2024-06-06 RX ADMIN — INSULIN LISPRO 6 UNITS: 100 INJECTION, SOLUTION INTRAVENOUS; SUBCUTANEOUS at 12:22

## 2024-06-06 RX ADMIN — HYDROCODONE BITARTRATE AND ACETAMINOPHEN 1 TABLET: 5; 325 TABLET ORAL at 08:54

## 2024-06-06 RX ADMIN — FUROSEMIDE 40 MG: 10 INJECTION, SOLUTION INTRAMUSCULAR; INTRAVENOUS at 04:35

## 2024-06-06 RX ADMIN — APIXABAN 2.5 MG: 5 TABLET, FILM COATED ORAL at 20:59

## 2024-06-06 RX ADMIN — LEVETIRACETAM 500 MG: 500 TABLET, FILM COATED ORAL at 04:36

## 2024-06-06 RX ADMIN — METOPROLOL TARTRATE 25 MG: 25 TABLET, FILM COATED ORAL at 20:59

## 2024-06-06 RX ADMIN — OLANZAPINE 5 MG: 5 TABLET, FILM COATED ORAL at 20:59

## 2024-06-06 RX ADMIN — ACETAMINOPHEN 650 MG: 325 TABLET ORAL at 20:59

## 2024-06-06 RX ADMIN — HYDROCODONE BITARTRATE AND ACETAMINOPHEN 1 TABLET: 5; 325 TABLET ORAL at 15:25

## 2024-06-06 RX ADMIN — HYDROCODONE BITARTRATE AND ACETAMINOPHEN 1 TABLET: 5; 325 TABLET ORAL at 21:27

## 2024-06-06 RX ADMIN — ATORVASTATIN CALCIUM 40 MG: 20 TABLET, FILM COATED ORAL at 20:59

## 2024-06-06 RX ADMIN — INSULIN GLARGINE 10 UNITS: 100 INJECTION, SOLUTION SUBCUTANEOUS at 10:40

## 2024-06-06 RX ADMIN — Medication 5 MG: at 20:59

## 2024-06-06 RX ADMIN — TIOTROPIUM BROMIDE INHALATION SPRAY 2 PUFF: 3.12 SPRAY, METERED RESPIRATORY (INHALATION) at 06:01

## 2024-06-06 RX ADMIN — LEVETIRACETAM 500 MG: 500 TABLET, FILM COATED ORAL at 16:28

## 2024-06-06 RX ADMIN — POTASSIUM CHLORIDE 40 MEQ: 1500 TABLET, EXTENDED RELEASE ORAL at 08:36

## 2024-06-06 RX ADMIN — MYCOPHENOLATE MOFETIL 1000 MG: 250 CAPSULE ORAL at 08:35

## 2024-06-06 RX ADMIN — FUROSEMIDE 40 MG: 10 INJECTION, SOLUTION INTRAMUSCULAR; INTRAVENOUS at 16:28

## 2024-06-06 RX ADMIN — PANTOPRAZOLE SODIUM 40 MG: 40 TABLET, DELAYED RELEASE ORAL at 05:58

## 2024-06-06 RX ADMIN — HYDROCORTISONE 20 MG: 5 TABLET ORAL at 08:35

## 2024-06-06 ASSESSMENT — COGNITIVE AND FUNCTIONAL STATUS - GENERAL
EATING MEALS: A LITTLE
CLIMB 3 TO 5 STEPS WITH RAILING: A LOT
MOVING TO AND FROM BED TO CHAIR: A LITTLE
DAILY ACTIVITIY SCORE: 18
WALKING IN HOSPITAL ROOM: A LOT
TOILETING: A LITTLE
PERSONAL GROOMING: A LITTLE
CLIMB 3 TO 5 STEPS WITH RAILING: A LOT
DRESSING REGULAR UPPER BODY CLOTHING: A LITTLE
DRESSING REGULAR LOWER BODY CLOTHING: A LITTLE
HELP NEEDED FOR BATHING: A LITTLE
MOBILITY SCORE: 18
MOVING TO AND FROM BED TO CHAIR: A LITTLE
DRESSING REGULAR UPPER BODY CLOTHING: A LITTLE
PERSONAL GROOMING: A LITTLE
DAILY ACTIVITIY SCORE: 18
STANDING UP FROM CHAIR USING ARMS: A LITTLE
DRESSING REGULAR LOWER BODY CLOTHING: A LITTLE
WALKING IN HOSPITAL ROOM: A LOT
TOILETING: A LITTLE
STANDING UP FROM CHAIR USING ARMS: A LITTLE
MOBILITY SCORE: 18
HELP NEEDED FOR BATHING: A LITTLE
EATING MEALS: A LITTLE

## 2024-06-06 ASSESSMENT — PAIN - FUNCTIONAL ASSESSMENT
PAIN_FUNCTIONAL_ASSESSMENT: 0-10
PAIN_FUNCTIONAL_ASSESSMENT: 0-10
PAIN_FUNCTIONAL_ASSESSMENT: WONG-BAKER FACES
PAIN_FUNCTIONAL_ASSESSMENT: 0-10

## 2024-06-06 ASSESSMENT — PAIN DESCRIPTION - ORIENTATION
ORIENTATION: RIGHT;LEFT
ORIENTATION: LEFT
ORIENTATION: LEFT;RIGHT

## 2024-06-06 ASSESSMENT — PAIN DESCRIPTION - DESCRIPTORS: DESCRIPTORS: BURNING

## 2024-06-06 ASSESSMENT — PAIN SCALES - GENERAL
PAINLEVEL_OUTOF10: 7
PAINLEVEL_OUTOF10: 7
PAINLEVEL_OUTOF10: 10 - WORST POSSIBLE PAIN
PAINLEVEL_OUTOF10: 8

## 2024-06-06 ASSESSMENT — PAIN SCALES - WONG BAKER
WONGBAKER_NUMERICALRESPONSE: HURTS WHOLE LOT
WONGBAKER_NUMERICALRESPONSE: NO HURT

## 2024-06-06 ASSESSMENT — PAIN DESCRIPTION - LOCATION
LOCATION: ANKLE
LOCATION: LEG
LOCATION: LEG

## 2024-06-06 NOTE — PROGRESS NOTES
Nephrology Progress Note    Assessment:  63 y.o. female with history s/f T2DM, SLE, COPD, adrenal insufficiency, Raynauds, RA, CKD stage III, depression, pHTN, pA.fib, gout, fibromyalgia, HTN and HLD who presented for abnormal labs.     JED on CKD stage III: ? If in setting of CRS as function improving w/ diuresis, CKD risk factors 2/2 T2DM, HTN, unlikely in setting of SLE, baseline Scr ~1.3-1.4 w/ eGFR low/mid 40s  Fluid overload   Hypomagnesemia   Hypernatremia: corrected   Hypokalemia: corrected      Plan:  - continue lasix 40 mg IV BID for today, change to PO tomorrow   - advised to hydrate more   - replete K, Mg       Subjective:  Admit Date: 6/4/2024    Interval History: function improving, diuresing well     Medications:  Scheduled Meds:apixaban, 2.5 mg, oral, BID  atorvastatin, 40 mg, oral, Nightly  cefepime, 1 g, intravenous, q24h  fludrocortisone, 0.1 mg, oral, Every other day  tiotropium, 2 puff, inhalation, Daily   And  fluticasone furoate-vilanteroL, 1 puff, inhalation, Daily  folic acid, 1 mg, oral, Daily  furosemide, 40 mg, intravenous, q12h  hydrocortisone, 20 mg, oral, Daily  insulin glargine, 10 Units, subcutaneous, q AM  insulin lispro, 0-10 Units, subcutaneous, TID  levETIRAcetam, 500 mg, oral, q12h  melatonin, 5 mg, oral, Nightly  metoprolol tartrate, 25 mg, oral, BID  metroNIDAZOLE, 500 mg, intravenous, Once  mycophenolate, 1,000 mg, oral, BID  OLANZapine, 5 mg, oral, Nightly  pantoprazole, 40 mg, oral, Daily  potassium chloride CR, 40 mEq, oral, Daily  thiamine, 100 mg, oral, Daily      Continuous Infusions:     CBC:   Lab Results   Component Value Date    WBC 3.7 (L) 06/06/2024    RBC 3.01 (L) 06/06/2024    HGB 9.0 (L) 06/06/2024    HCT 30.2 (L) 06/06/2024     06/06/2024    MCH 29.9 06/06/2024    MCHC 29.8 (L) 06/06/2024    RDW 19.7 (H) 06/06/2024    PLT 78 (L) 06/06/2024     BMP:    Lab Results   Component Value Date     06/06/2024    K 4.0 06/06/2024     06/06/2024    CO2  "31 06/06/2024    BUN 40 (H) 06/06/2024    CREATININE 2.00 (H) 06/06/2024    CALCIUM 9.1 06/06/2024    GLUCOSE 72 (L) 06/06/2024       Objective:  Vitals: /66   Pulse 65   Temp 35.5 °C (95.9 °F) (Temporal)   Resp 18   Ht 1.778 m (5' 10\")   Wt 95.3 kg (210 lb)   LMP  (LMP Unknown)   SpO2 92%   BMI 30.13 kg/m²    Wt Readings from Last 3 Encounters:   06/04/24 95.3 kg (210 lb)   05/13/24 102 kg (224 lb 13.9 oz)   04/24/24 90.9 kg (200 lb 6.4 oz)      24HR INTAKE/OUTPUT:    Intake/Output Summary (Last 24 hours) at 6/6/2024 1146  Last data filed at 6/6/2024 0512  Gross per 24 hour   Intake --   Output 1600 ml   Net -1600 ml       General: alert, in no apparent distress  HEENT: normocephalic, atraumatic, anicteric  Lungs: non-labored respirations, clear to auscultation bilaterally  Heart: regular rate and rhythm, no murmurs or rubs  Abdomen: soft, non-tender, non-distended  Ext: no cyanosis, ++ BLE peripheral edema  Neuro: alert and oriented, no gross abnormalities  Psych: normal mood and affect  Skin: multiple LE open skin blisters      Electronically signed by Federico Chowdhury MD              "

## 2024-06-06 NOTE — PROGRESS NOTES
"   06/06/24 0230   Current Planned Discharge Disposition   Current Planned Discharge Disposition Inter  (Referral sent to [facility])     SW notified by [facility] that they have issued pt a discharge notice due to non-compliance with Medicaid process. Facility is willing to accept pt back to the facility and continue to move forward with plans for [facility] transfer. TITI spoke with pt son López regarding situation. SW informed López that facility is willing to accept pt back and continue working on [facility] to [facility] transfer. López states he would prefer looking at other facility options. TITI explained barriers in finding facility placement due to pt being \"Medicaid pending\" for payor. López states pt is unable to pay privately. TITI discussed facility options with López and explained that pt may have to return to [facility] if unable to get her accepted to any other facility, López verbalized understanding. López requesting referral be sent to [facility], referral sent via careport, awaiting facility response.   "

## 2024-06-06 NOTE — PROGRESS NOTES
Bing Holliday is a 63 y.o. female on day 2 of admission presenting with Cellulitis of leg without foot.      Subjective   Patient is complaining of mild dizziness, was hypoglycemic early in the morning, was given D50  Nephrology following, will switch IV Lasix to oral in a.m.  We will follow-up on cultures for antibiotic management  Was seen by wound care, bilateral lower extremity wrapped properly    Objective     Last Recorded Vitals  /60 (BP Location: Right arm, Patient Position: Lying)   Pulse 59   Temp 35 °C (95 °F) (Temporal)   Resp 18   Wt 95.3 kg (210 lb)   SpO2 96%   Intake/Output last 3 Shifts:    Intake/Output Summary (Last 24 hours) at 6/6/2024 1433  Last data filed at 6/6/2024 0512  Gross per 24 hour   Intake --   Output 1600 ml   Net -1600 ml       Admission Weight  Weight: 95.3 kg (210 lb) (06/04/24 1313)    Daily Weight  06/04/24 : 95.3 kg (210 lb)    Image Results  XR ankle bilateral complete minimum 3 views, XR tibia fibula bilateral 2 views  Narrative: Interpreted By:  Jaziel Montalvo,   STUDY:  XR ANKLE BILATERAL COMPLETE MINIMUM 3 VIEWS; XR TIBIA FIBULA  BILATERAL 2 VIEWS; ;  6/5/2024 8:26 pm      INDICATION:  Signs/Symptoms:bilateral lower extremity wounds.      COMPARISON:  None.      ACCESSION NUMBER(S):  [id number]; [id number]      ORDERING CLINICIAN:  LUDWIN MCNEILL      FINDINGS:  Osteopenic bone.      RIGHT:  There is diffuse edema of the right leg and ankle. No radiopaque  foreign bodies or soft tissue gas. There is a rounded centrally  lucent calcification of the lateral right thigh likely representing  an area of fat necrosis. Scattered phleboliths in the pretibial soft  tissues are likely reflective of venous stasis edema. No definite  osseous destruction or erosive change. No acute fracture or  malalignment. There is severe osteoarthrosis of the right knee with  bone-on-bone contact and exuberant osteophyte formation. There is  prominent dorsal midfoot spurring  and 5th tarsometatarsal spurring.  There is internal fixation hardware with a single screw extending  from the 1st metatarsal diaphysis across the Lisfranc joint to the  middle cuneiform bone. There is minimal perihardware lucency at the  head of the screw.          LEFT:  There is diffuse edema of the soft tissues without radiopaque foreign  body or gas. There is severe left knee osteoarthrosis with  bone-on-bone contact and prominent osteophytosis. There is a metallic  cerclage wire extending through the patella and the tibial  tuberosity. Limited evaluation of the ankle due to contracture. There  is severe hindfoot valgus. There is diffuse ankylosis of the hindfoot  and midfoot articulations with abandoned arthrodesis hardware tracts  noted. There is advanced tibiotalar osteoarthrosis. There is a remote  fracture deformity of the distal fibular and metadiaphysis. No  definite osseous destruction or erosive change. No evidence of acute  fracture.      Impression: 1. Diffuse edema of the bilateral legs without radiopaque foreign  body or gas. No radiographic evidence of acute osteomyelitis.      2. Severe bilateral knee osteoarthrosis (right > left).      3. Diffuse ankylosis of the left hindfoot and midfoot articulations,  severe left tibiotalar osteoarthrosis common severe hindfoot valgus.      Multiple potential etiologies of atraumatic pain as described in  detail above, depending on location and clinical context.      MACRO:  None.      Signed by: Jaziel Montalvo 6/5/2024 10:15 PM  Dictation workstation:   [id number]      Physical Exam    General: Well-developed female, in no acute distress  HEENT: AT, NC, no JVD, no lymphadenopathy, neck supple  Lungs: Clear, no wheezing, no crackles  Cardiac: Normal S1-S2, no murmur, no gallop, pacemaker intact  Abdomen: Soft, nontender, no distention, positive bowel sound  Extremities: Bilateral lower extremity blisters noted, bilateral lower extremity 1+ nonpitting  edema noted, bilateral hands and feet deformity noted  Neurological: Alert awake oriented x3, sensation intact, clear speech    Assessment/Plan      Principal Problem:    Cellulitis of leg without foot    Bing Holliday is a 63 y.o. female from a care facility with a past medical history of adrenocortical insufficiency, generalized weakness, hyperparathyroidism, lupus, diabetes, COPD, protein calorie malnutrition, anxiety, Raynaud's, iron deficiency anemia, rheumatoid arthritis, chronic kidney disease, depression, pulmonary hypertension, cardiomyopathy, paroxysmal A-fib on Eliquis, gout, fibromyalgia, hypertension, hyperlipidemia, who was admitted for management of abnormal lab workup and bilateral lower extremity wounds and swelling     Hypernatremia is chronic and actually it is showing some sort of improvement down from 149 3 weeks ago to 146 this admission.     Plan:   Telemetry monitor, pain management, antiemetic, oxygen therapy as needed  Fall/Seizure precaution  Continue with IV antibiotic cefepime for lower extremity wounds  Follow-up cultures  Wound care following, outpatient follow up with podiatry   Nephrology following, hypernatremia resolved, switch IV Lasix to oral in am   Monitor renal function, avoid nephrotoxic agent  Monitor CBC, transfuse to keep hemoglobin>7  Magnesium and potassium was replaced, back to normal   Elevated Trope likely demand ischemia in the setting of JED, patient is asymptomatic, will monitor  ISS, hypoglycemia protocol, POC glucose, DM diet  Continue with home meds for other comorbidities  If dizziness continued will consider further workup and Meclizine   VTE/GI prophylaxis: Eliquis/Protonix  Disposition: Back to the care facility once hemodynamically stable  Patient uses a walker for ambulation    Vivek Orozco MD

## 2024-06-06 NOTE — PROGRESS NOTES
Pt was admitted to [facility] 5/7 - 5/18. Returned back to [facility] after discharge. During patient admission at [facility], patient had a pacemaker implanted by / [employer].    6/4 patient admitted to [facility] due to abnormal lab results from care facility, bilateral leg edema, pain and wounds. Currently receiving IV lasix. Had hypomagnesemia, hypernatremia and hypokalemia treated. Nephrology following patient due to JED.    7/8: Remains inpatient.

## 2024-06-06 NOTE — CARE PLAN
Problem: Nutrition  Goal: Less than 5 days NPO/clear liquids  Outcome: Progressing  Goal: Oral intake greater than 50%  Outcome: Progressing  Goal: Oral intake greater 75%  Outcome: Progressing  Goal: Consume prescribed supplement  Outcome: Progressing  Goal: Adequate PO fluid intake  Outcome: Progressing  Goal: Nutrition support goals are met within 48 hrs  Outcome: Progressing  Goal: Nutrition support is meeting 75% of nutrient needs  Outcome: Progressing  Goal: Tube feed tolerance  Outcome: Progressing  Goal: BG  mg/dL  Outcome: Progressing  Goal: Lab values WNL  Outcome: Progressing  Goal: Electrolytes WNL  Outcome: Progressing  Goal: Promote healing  Outcome: Progressing  Goal: Maintain stable weight  Outcome: Progressing  Goal: Reduce weight from edema/fluid  Outcome: Progressing  Goal: Gradual weight gain  Outcome: Progressing  Goal: Improve ostomy output  Outcome: Progressing     Problem: Pain - Adult  Goal: Verbalizes/displays adequate comfort level or baseline comfort level  Outcome: Progressing  Flowsheets (Taken 6/6/2024 9729)  Verbalizes/displays adequate comfort level or baseline comfort level:   Encourage patient to monitor pain and request assistance   Assess pain using appropriate pain scale   Administer analgesics based on type and severity of pain and evaluate response     Problem: Safety - Adult  Goal: Free from fall injury  Outcome: Progressing     Problem: Discharge Planning  Goal: Discharge to home or other facility with appropriate resources  Outcome: Progressing     Problem: Chronic Conditions and Co-morbidities  Goal: Patient's chronic conditions and co-morbidity symptoms are monitored and maintained or improved  Outcome: Progressing     Problem: Pain  Goal: Takes deep breaths with improved pain control throughout the shift  Outcome: Progressing  Goal: Turns in bed with improved pain control throughout the shift  Outcome: Progressing  Goal: Walks with improved pain control  throughout the shift  Outcome: Progressing  Goal: Performs ADL's with improved pain control throughout shift  Outcome: Progressing  Goal: Participates in PT with improved pain control throughout the shift  Outcome: Progressing  Goal: Free from opioid side effects throughout the shift  Outcome: Progressing  Goal: Free from acute confusion related to pain meds throughout the shift  Outcome: Progressing     Problem: Pain  Goal: My pain/discomfort is manageable  Outcome: Progressing     Problem: Safety  Goal: Patient will be injury free during hospitalization  Outcome: Progressing  Goal: I will remain free of falls  Outcome: Progressing     Problem: Daily Care  Goal: Daily care needs are met  Outcome: Progressing     Problem: Psychosocial Needs  Goal: Demonstrates ability to cope with hospitalization/illness  Outcome: Progressing  Goal: Collaborate with me, my family, and caregiver to identify my specific goals  Outcome: Progressing     Problem: Discharge Barriers  Goal: My discharge needs are met  Outcome: Progressing     Problem: Skin  Goal: Decreased wound size/increased tissue granulation at next dressing change  Outcome: Progressing  Flowsheets (Taken 6/6/2024 1519)  Decreased wound size/increased tissue granulation at next dressing change: Promote sleep for wound healing  Goal: Participates in plan/prevention/treatment measures  Outcome: Progressing  Flowsheets (Taken 6/6/2024 1519)  Participates in plan/prevention/treatment measures: Elevate heels  Goal: Prevent/manage excess moisture  Outcome: Progressing  Flowsheets (Taken 6/6/2024 1519)  Prevent/manage excess moisture: Cleanse incontinence/protect with barrier cream  Goal: Prevent/minimize sheer/friction injuries  Outcome: Progressing  Flowsheets (Taken 6/6/2024 1519)  Prevent/minimize sheer/friction injuries:   HOB 30 degrees or less   Turn/reposition every 2 hours/use positioning/transfer devices   Use pull sheet  Goal: Promote/optimize nutrition  Outcome:  Progressing  Goal: Promote skin healing  Outcome: Progressing   The patient's goals for the shift include to get back to normal    The clinical goals for the shift include Patient will have vitals wnl and pain controlld throughout shift.

## 2024-06-06 NOTE — CARE PLAN
Problem: Nutrition  Goal: Less than 5 days NPO/clear liquids  Outcome: Progressing  Goal: Oral intake greater than 50%  Outcome: Progressing  Goal: Oral intake greater 75%  Outcome: Progressing  Goal: Consume prescribed supplement  Outcome: Progressing  Goal: Adequate PO fluid intake  Outcome: Progressing  Goal: Nutrition support goals are met within 48 hrs  Outcome: Progressing  Goal: Nutrition support is meeting 75% of nutrient needs  Outcome: Progressing  Goal: Tube feed tolerance  Outcome: Progressing  Goal: BG  mg/dL  Outcome: Progressing  Goal: Lab values WNL  Outcome: Progressing  Goal: Electrolytes WNL  Outcome: Progressing  Goal: Promote healing  Outcome: Progressing  Goal: Maintain stable weight  Outcome: Progressing  Goal: Reduce weight from edema/fluid  Outcome: Progressing  Goal: Gradual weight gain  Outcome: Progressing  Goal: Improve ostomy output  Outcome: Progressing   The patient's goals for the shift include to get back to normal    The clinical goals for the shift include pain control/ safety    Over the shift, the patient did make progress toward the following goals.

## 2024-06-06 NOTE — CONSULTS
Inpatient consult to Podiatry  Consult performed by: Rubia Santana, APRN-CNP  Consult ordered by: Vivek Orozco MD      PODIATRY CONSULT NOTE    SERVICE DATE: 6/5/2024   SERVICE TIME:  2000    REASON FOR CONSULT: Bilateral lower extremity wounds  REQUESTING PHYSICIAN: Dr. Orozco  PRIMARY CARE PHYSICIAN: Claudio Hood DO    Subjective   HPI:  Ms. Holliday is a 63 y.o. female with past medical history of lupus, myelodysplastic syndrome, CKD stage III, A-fib on Eliquis, rheumatoid arthritis, type 2 diabetes, hyperlipidemia presented to the ER with worsening bilateral lower extremity swelling and wounds.  She states blisters appeared about a week ago.  They have since been weeping and her pain has gotten worse.  Patient denies chest pain, shortness of breath, fever, chills, nausea, vomiting, diarrhea.    ER course: Lab work showed glucose 155, creatinine 2.24, magnesium 1.37, , troponin 25, INR 1.3, hemoglobin 8.1.  White blood cell count was 3.6.  Chest x-ray showed no acute findings.  She was admitted for further workup and treatment.  Podiatry was consulted.    ROS: 10-point review of systems was performed and is otherwise negative except as noted in HPI.  PMH: Reviewed/documented below.  PSH:  Noncontributory except per HPI   FH: Reviewed and noncontributory   SOCIAL:  Reviewed/documented below.  ALLERGIES: Reviewed/documented below.  MEDS: Reviewed/documented below.  VS: Reviewed/documented below.    Past Medical History:   Diagnosis Date    Abnormal kidney function 04/04/2023    Acute headache 03/10/2024    Acute low back pain 10/30/2023    Acute upper respiratory infection, unspecified 03/04/2020    Acute URI    Acute upper respiratory infection, unspecified 09/30/2015    URTI (acute upper respiratory infection)    Arthritis     Atypical facial pain 03/10/2024    Body mass index (BMI) 23.0-23.9, adult 10/15/2021    BMI 23.0-23.9, adult    Body mass index (BMI) 33.0-33.9, adult 03/04/2020    BMI  33.0-33.9,adult    Cardiomegaly 08/27/2013    Left ventricular hypertrophy    Chest pain 06/10/2022    Comment on above: Added by Problem List Migration; 2013-3-12; Moved to Suppressed Nov 25 2013 9:16PM; Comment on above: Added by Problem List Migration; 2013-3-12; Moved to Suppressed Nov 25 2013 9:16PM;    Chronic kidney disease, stage 3 unspecified (Multi) 07/02/2013    Chronic kidney disease, stage III (moderate)    Confusional state 03/10/2024    Contact with and (suspected) exposure to covid-19 04/04/2023    Delirium 03/10/2024    Disease of pericardium, unspecified ([facility]) 07/02/2013    Pericardial disease    Encounter for follow-up examination after completed treatment for conditions other than malignant neoplasm 10/06/2022    Hospital discharge follow-up    Generalized contraction of visual field, right eye 01/29/2015    Generalized contraction of visual field of right eye    Hearing loss 03/10/2024    History of cataract 03/10/2024    History of thrombocytopenia 03/10/2024    Comment on above: Added by Problem List Migration; 2013-7-2;    Homonymous bilateral field defects, right side 04/29/2016    Homonymous bilateral field defects of right side    Hypertensive chronic kidney disease with stage 1 through stage 4 chronic kidney disease, or unspecified chronic kidney disease 07/02/2013    Nephrosclerosis    Laceration without foreign body, left foot, initial encounter 07/03/2018    Foot laceration, left, initial encounter    Localized edema 03/10/2024    Mass of skin 03/10/2024    Migraine with aura, not intractable, without status migrainosus 10/24/2022    Ocular migraine    Open wound 03/10/2024    Open wound of left foot 03/10/2024    Other conditions influencing health status 07/02/2013    Chronic Glomerulonephritis In Diseases Classified Elsewhere    Other conditions influencing health status 07/02/2013    Progressive Familial Myoclonic Epilepsy    Other conditions influencing health status  07/02/2013    Protein S Deficiency    Other conditions influencing health status 05/22/2015    Familial Combined Hyperlipidemia    Other conditions influencing health status 10/24/2022    IDDM (insulin dependent diabetes mellitus)    Other conditions influencing health status 03/14/2022    Diabetes mellitus, insulin dependent (IDDM), uncontrolled    Other long term (current) drug therapy 10/24/2022    Long-term use of Plaquenil    Overweight with body mass index (BMI) 25.0-29.9 03/10/2024    Pain of toe 03/10/2024    Personal history of COVID-19 04/04/2023    Personal history of diseases of the blood and blood-forming organs and certain disorders involving the immune mechanism 07/02/2013    History of thrombocytopenia    Personal history of diseases of the skin and subcutaneous tissue 08/11/2015    History of foot ulcer    Personal history of nephrotic syndrome 07/02/2013    History of nephrotic syndrome    Personal history of other diseases of the circulatory system 08/27/2013    History of sinus tachycardia    Personal history of other diseases of the nervous system and sense organs     History of cataract    Personal history of other diseases of the respiratory system     History of bronchitis    Personal history of other infectious and parasitic diseases 07/02/2013    History of hepatitis    Personal history of other specified conditions     History of shortness of breath    Personal history of other specified conditions 08/27/2013    History of edema    Posterior epistaxis 03/10/2024    Puckering of macula, right eye 10/24/2022    ERM OD (epiretinal membrane, right eye)    Raynaud's syndrome without gangrene 07/02/2013    Raynaud's disease    Sinusitis 03/10/2024    Systemic lupus erythematosus, unspecified (Multi) 07/24/2015    SLE (systemic lupus erythematosus)    Systemic lupus erythematosus, unspecified (Multi) 07/24/2015    SLE (systemic lupus erythematosus)    Systemic lupus erythematosus, unspecified  (Multi) 07/24/2015    Systemic lupus    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) 07/02/2013    Type 2 diabetes with nephropathy    Type 2 diabetes mellitus with mild nonproliferative diabetic retinopathy without macular edema, left eye (Multi) 07/27/2015    Non-proliferative diabetic retinopathy, left eye    Type 2 diabetes mellitus with mild nonproliferative diabetic retinopathy without macular edema, unspecified eye (Multi) 07/24/2015    Mild non proliferative diabetic retinopathy    Type 2 diabetes mellitus with proliferative diabetic retinopathy without macular edema, right eye (Multi) 07/27/2015    Proliferative diabetic retinopathy of right eye    Type 2 diabetes mellitus with proliferative diabetic retinopathy without macular edema, unspecified eye (Multi) 07/24/2015    Diabetic proliferative retinopathy    Unspecified acute and subacute iridocyclitis 07/24/2015    Acute iritis, right eye    Unspecified open wound, left foot, sequela 07/03/2018    Wound, open, foot, left, sequela     Past Surgical History:   Procedure Laterality Date    ANKLE SURGERY  01/29/2015    Ankle Surgery    CARDIAC ELECTROPHYSIOLOGY PROCEDURE Left 5/17/2024    Procedure: PPM IMPLANT DUAL;  Surgeon: Antonio Rodriges MD;  Location: [facility];  Service: Electrophysiology;  Laterality: Left;  Pt. needs platelets and Prednisone prior to procedure    CHOLECYSTECTOMY  01/29/2015    Cholecystectomy    CT GUIDED PERCUTANEOUS BIOPSY BONE DEEP  5/4/2021    CT GUIDED PERCUTANEOUS BIOPSY BONE DEEP 5/4/2021 [facility] CLINICAL LEGACY    EYE SURGERY  03/06/2015    Eye Surgery    FOOT SURGERY  01/29/2015    Foot Surgery    MR HEAD ANGIO WO IV CONTRAST  7/26/2013    MR HEAD ANGIO WO IV CONTRAST 7/26/2013 [facility] CLINICAL LEGACY    MR HEAD ANGIO WO IV CONTRAST  9/17/2021    MR HEAD ANGIO WO IV CONTRAST 9/17/2021 [facility]    MR HEAD ANGIO WO IV CONTRAST  3/25/2023    MR HEAD ANGIO WO IV CONTRAST [facility]    MR NECK ANGIO WO  IV CONTRAST  7/26/2013    MR NECK ANGIO WO IV CONTRAST 7/26/2013 [facility] CLINICAL LEGACY    MR NECK ANGIO WO IV CONTRAST  9/17/2021    MR NECK ANGIO WO IV CONTRAST 9/17/2021 [facility] EMERGENCY LEGACY    MR NECK ANGIO WO IV CONTRAST  3/25/2023    MR NECK ANGIO WO IV CONTRAST [facility]    OTHER SURGICAL HISTORY  01/29/2015    Creation Of Pericardial Window    OTHER SURGICAL HISTORY  01/29/2015    Quadricepsplasty    TOTAL HIP ARTHROPLASTY  01/29/2015    Hip Replacement     Family History   Problem Relation Name Age of Onset    Other (RENAL DISEASE) Mother          END STAGE    Other (CARDIAC DISORDER) Mother      Cataracts Mother      Stroke Mother      Diabetes Mother      Kidney disease Mother      Lupus Mother      Other (CARDIAC DISORDER) Father      COPD Father      Glaucoma Father      Hypertension Father      Sleep apnea Father      Other (CARDIAC DISORDER) Sister      Depression Sister      Kidney disease Sister      Sickle cell trait Sister      Sleep apnea Daughter       Social History     Tobacco Use    Smoking status: Never     Passive exposure: Never    Smokeless tobacco: Never   Vaping Use    Vaping status: Never Used   Substance Use Topics    Alcohol use: Not Currently     Comment: RARE    Drug use: Never      Medications Prior to Admission   Medication Sig Dispense Refill Last Dose    apixaban (Eliquis) 2.5 mg tablet Take 1 tablet (2.5 mg) by mouth 2 times a day. 60 tablet 1 6/4/2024    atorvastatin (Lipitor) 40 mg tablet Take 1 tablet (40 mg) by mouth once daily. (Patient taking differently: Take 1 tablet (40 mg) by mouth once daily at bedtime.) 90 tablet 3 6/3/2024    fludrocortisone (Florinef) 0.1 mg tablet Take 1 tablet (0.1 mg) by mouth every other day.   6/3/2024    fluticasone-umeclidin-vilanter (TRELEGY-ELLIPTA) 200-62.5-25 mcg blister with device Inhale 1 puff once daily. 60 each 5 6/4/2024    folic acid (Folvite) 1 mg tablet TAKE 1 TABLET BY MOUTH EVERY DAY 90 tablet 1 6/4/2024    furosemide (Lasix) 20  mg tablet Take 2 tablets (40 mg) by mouth once daily.   2024    guaiFENesin (Mucinex) 600 mg 12 hr tablet Take 2 tablets (1,200 mg) by mouth 2 times a day. Do not crush, chew, or split.   2024    hydrocortisone (Cortef) 10 mg tablet Take 1 tablet (10 mg) by mouth once daily at bedtime.   6/3/2024    hydrocortisone (Cortef) 20 mg tablet Take 1 tablet (20 mg) by mouth once daily.   2024    insulin glargine (Lantus U-100 Insulin) 100 unit/mL injection Inject 10 Units under the skin once daily in the morning. Take as directed per insulin instructions.   2024    insulin lispro (HumaLOG) 100 unit/mL injection Inject under the skin 3 times a day as needed for high blood sugar (before meals). Take as directed per insulin Sliding Scale instructions.  0-150 = 0 units  151-200 = 2 units  201-250 = 4 units  251-300 = 6 units  301-350 = 8 units  351-400 = 10 units  >400 = give 10 units and contact MD   2024 at noon    levETIRAcetam (Keppra) 500 mg tablet Take 1 tablet (500 mg) by mouth every 12 hours. 60 tablet 0 2024    levoFLOXacin (Levaquin) 500 mg tablet Take 1 tablet (500 mg) by mouth once daily. X 10 days   2024    [] magnesium oxide (Mag-Ox) 400 mg (241.3 mg magnesium) tablet Take 2 tablets (800 mg) by mouth once daily. Do not fill before May 1, 2024.   2024    melatonin 5 mg tablet Take 1 tablet (5 mg) by mouth once daily at bedtime.   6/3/2024    metoprolol tartrate (Lopressor) 25 mg tablet Take 1 tablet (25 mg) by mouth 2 times a day.   2024    multivitamin with minerals tablet Take 1 tablet by mouth once daily.   2024    mycophenolate (Cellcept) 500 mg tablet TAKE 2 TABLETS BY MOUTH TWICE A  tablet 0 2024    OLANZapine (ZyPREXA) 7.5 mg tablet Take 1 tablet (7.5 mg) by mouth once daily at bedtime.   2024    pantoprazole (ProtoNix) 40 mg EC tablet TAKE 1 TABLET BY MOUTH EVERY DAY 90 tablet 1 2024    potassium chloride CR 20 mEq ER tablet Take 1 tablet (20  "mEq) by mouth once daily. Do not crush or chew.   6/4/2024    thiamine 100 mg tablet Take 1 tablet (100 mg) by mouth once daily.   6/4/2024    acetaminophen (Tylenol) 325 mg tablet Take 2 tablets (650 mg) by mouth every 4 hours if needed for mild pain (1 - 3), moderate pain (4 - 6) or fever (temp greater than 38.0 C).   Unknown    albuterol 90 mcg/actuation inhaler Inhale 2 puffs every 6 hours if needed for shortness of breath or wheezing.   Unknown    atovaquone (Mepron) 750 mg/5 mL suspension Take 10 mL (1,500 mg) by mouth once daily.       balsam peru-castor oiL (Venelex) ointment Apply topically 2 times a day. APPLY TO BUTTOCKS, SACRUM, AND THIGHS.   Unknown    blood-glucose sensor (Dexcom G6 Sensor) device Use to check sugars 3 times daily 4 each 2     Dexcom G4 platinum  (Dexcom G6 ) misc Use as instructed 1 each 0     Dexcom G4 platinum transmitter (Dexcom G6 Transmitter) device Use as instructed 1 each 0     glucagon HCL (Glucagon, HCl, Emergency Kit) 1 mg recon soln Inject 1 mg into the muscle if needed.   Unknown    meclizine (Antivert) 25 mg tablet Take 1 tablet (25 mg) by mouth every 12 hours if needed for dizziness.   Unknown    nut.tx.gluc intol,lf,soy/fiber (BOOST GLUCOSE CONTROL ORAL) Take 8 fluid ounces by mouth once daily in the morning.       OLANZapine (ZyPREXA) 5 mg tablet Take 1 tablet (5 mg) by mouth once daily at bedtime.       pen needle, diabetic 31 gauge x 5/16\" needle Use to inject 1-4 times daily as directed. 100 each 11         Medications:  Scheduled Meds: apixaban, 2.5 mg, oral, BID  atorvastatin, 40 mg, oral, Nightly  cefepime, 1 g, intravenous, q24h  fludrocortisone, 0.1 mg, oral, Every other day  tiotropium, 2 puff, inhalation, Daily   And  fluticasone furoate-vilanteroL, 1 puff, inhalation, Daily  folic acid, 1 mg, oral, Daily  furosemide, 40 mg, intravenous, q12h  hydrocortisone, 20 mg, oral, Daily  insulin glargine, 10 Units, subcutaneous, q AM  insulin lispro, " 0-10 Units, subcutaneous, TID  levETIRAcetam, 500 mg, oral, q12h  melatonin, 5 mg, oral, Nightly  metoprolol tartrate, 25 mg, oral, BID  metroNIDAZOLE, 500 mg, intravenous, Once  mycophenolate, 1,000 mg, oral, BID  OLANZapine, 5 mg, oral, Nightly  pantoprazole, 40 mg, oral, Daily  potassium chloride CR, 40 mEq, oral, Daily  thiamine, 100 mg, oral, Daily      Continuous Infusions:    PRN Meds: PRN medications: acetaminophen **OR** acetaminophen, dextrose, dextrose, glucagon, glucagon, HYDROcodone-acetaminophen, meclizine    Allergies as of 06/04/2024 - Reviewed 06/04/2024   Allergen Reaction Noted    Ace inhibitors Swelling, Angioedema, Shortness of breath, and Other 01/16/2014    Hydroxychloroquine Other, Nausea And Vomiting, Nausea/vomiting, and Unknown 04/06/2022    Lisinopril Swelling 08/01/2023    Penicillins Unknown 01/19/2004    Sulfa (sulfonamide antibiotics) Hives 04/13/2023            Objective   PHYSICAL EXAM:  Physical Exam Performed:  Vitals:    06/05/24 2048   BP: 100/66   Pulse: 60   Resp: 18   Temp: 35.5 °C (95.9 °F)   SpO2: 97%     Body mass index is 30.13 kg/m².    Patient is AOx3 and in no acute distress. Patient is alert and cooperative. Sitting comfortably in bed with dressing clean, dry and intact.     Vascular: Faintly palpable DP/PT pulses B/L. Moderate non-pitting edema noted B/L. Hair growth absent B/L. CFT<5 to B/L hallux. Temperature is warm to cool from tibial tuberosity to distal digits B/L. No lymphatic streaking noted B/L.    Musculoskeletal: Gross active and passive ROM intact to age and activity level. Moves all extremities spontaneously. No pain to palpation at feet B/L.     Neurological: Intact light touch sensation B/L. Pain stimuli diminished B/L. Denies any numbness, burning or tingling.    Dermatologic: Nails 1-5 are thickened, elongated, discolored with subungual debris B/L. Skin appears waxy B/L. Web spaces 1-4 B/L are clean, dry and intact. No rashes or nodules noted B/L. No  hyperkeratotic tissue noted B/L.     Wound:  Measurements: anterior right lower extremity - 8cm x 2cm, medial right LE - 1cm x1cm. Anterior left lower extremity difficult to measure due to sloughing skin  Mixed wound base of macerated tissue.   Significant serosanguinous drainage with fibrotic slough.   Significant candido-wound maceration.   Significant candido-wound erythema.   Moderate evidence of ascending cellulitis or lymphangitis.  Mild palpable fluctuance. Moderate malodor. Mild increased warmth.   No probe to bone or deep structures within the wound base.   No tunneling or tracking.   Minimal undermining. Skin edges irregular but intact.    LABS:   Results for orders placed or performed during the hospital encounter of 06/04/24 (from the past 24 hour(s))   POCT GLUCOSE   Result Value Ref Range    POCT Glucose 185 (H) 74 - 99 mg/dL   POCT GLUCOSE   Result Value Ref Range    POCT Glucose 164 (H) 74 - 99 mg/dL   CBC   Result Value Ref Range    WBC 3.6 (L) 4.4 - 11.3 x10*3/uL    nRBC 0.0 0.0 - 0.0 /100 WBCs    RBC 2.56 (L) 4.00 - 5.20 x10*6/uL    Hemoglobin 7.7 (L) 12.0 - 16.0 g/dL    Hematocrit 25.5 (L) 36.0 - 46.0 %     80 - 100 fL    MCH 30.1 26.0 - 34.0 pg    MCHC 30.2 (L) 32.0 - 36.0 g/dL    RDW 19.7 (H) 11.5 - 14.5 %    Platelets 85 (L) 150 - 450 x10*3/uL   Basic Metabolic Panel   Result Value Ref Range    Glucose 158 (H) 74 - 99 mg/dL    Sodium 146 (H) 136 - 145 mmol/L    Potassium 3.3 (L) 3.5 - 5.3 mmol/L    Chloride 109 (H) 98 - 107 mmol/L    Bicarbonate 30 21 - 32 mmol/L    Anion Gap 10 10 - 20 mmol/L    Urea Nitrogen 37 (H) 6 - 23 mg/dL    Creatinine 2.06 (H) 0.50 - 1.05 mg/dL    eGFR 27 (L) >60 mL/min/1.73m*2    Calcium 8.7 8.6 - 10.3 mg/dL   Magnesium   Result Value Ref Range    Magnesium 1.78 1.60 - 2.40 mg/dL   SST TOP   Result Value Ref Range    Extra Tube Hold for add-ons.    POCT GLUCOSE   Result Value Ref Range    POCT Glucose 131 (H) 74 - 99 mg/dL   POCT GLUCOSE   Result Value Ref Range     POCT Glucose 149 (H) 74 - 99 mg/dL   Blood Culture    Specimen: Peripheral Venipuncture; Blood culture   Result Value Ref Range    Blood Culture Loaded on Instrument - Culture in progress    Blood Culture    Specimen: Peripheral Venipuncture; Blood culture   Result Value Ref Range    Blood Culture Loaded on Instrument - Culture in progress    POCT GLUCOSE   Result Value Ref Range    POCT Glucose 165 (H) 74 - 99 mg/dL   POCT GLUCOSE   Result Value Ref Range    POCT Glucose 160 (H) 74 - 99 mg/dL      Lab Results   Component Value Date    HGBA1C 6.8 (H) 06/04/2024      Lab Results   Component Value Date    CRP 1.02 (H) 05/10/2024      Lab Results   Component Value Date    SEDRATE 29 05/10/2024        Results from last 7 days   Lab Units 06/05/24  0622   WBC AUTO x10*3/uL 3.6*   RBC AUTO x10*6/uL 2.56*   HEMOGLOBIN g/dL 7.7*   HEMATOCRIT % 25.5*     Results from last 7 days   Lab Units 06/05/24 0622 06/04/24  1458   SODIUM mmol/L 146* 146*   POTASSIUM mmol/L 3.3* 3.9   CHLORIDE mmol/L 109* 110*   CO2 mmol/L 30 27   BUN mg/dL 37* 41*   CREATININE mg/dL 2.06* 2.24*   CALCIUM mg/dL 8.7 8.6   MAGNESIUM mg/dL 1.78 1.37*   BILIRUBIN TOTAL mg/dL  --  0.4   ALT U/L  --  15   AST U/L  --  18     Results from last 7 days   Lab Units 06/04/24 2003   COLOR U  Straw   APPEARANCE U  Clear   PH U  6.0   SPEC GRAV UR  1.009   PROTEIN U mg/dL 30 (1+)*   BLOOD UR  NEGATIVE   NITRITE U  NEGATIVE   WBC UR /HPF NONE   BACTERIA UR /HPF 1+*       IMAGING REVIEW:  XR ankle bilateral complete minimum 3 views    Result Date: 6/5/2024  Interpreted By:  Jaziel Montalvo, STUDY: XR ANKLE BILATERAL COMPLETE MINIMUM 3 VIEWS; XR TIBIA FIBULA BILATERAL 2 VIEWS; ;  6/5/2024 8:26 pm   INDICATION: Signs/Symptoms:bilateral lower extremity wounds.   COMPARISON: None.   ACCESSION NUMBER(S): [id number]; [id number]   ORDERING CLINICIAN: LUDWIN MCNEILL   FINDINGS: Osteopenic bone.   RIGHT: There is diffuse edema of the right leg and ankle. No  radiopaque foreign bodies or soft tissue gas. There is a rounded centrally lucent calcification of the lateral right thigh likely representing an area of fat necrosis. Scattered phleboliths in the pretibial soft tissues are likely reflective of venous stasis edema. No definite osseous destruction or erosive change. No acute fracture or malalignment. There is severe osteoarthrosis of the right knee with bone-on-bone contact and exuberant osteophyte formation. There is prominent dorsal midfoot spurring and 5th tarsometatarsal spurring. There is internal fixation hardware with a single screw extending from the 1st metatarsal diaphysis across the Lisfranc joint to the middle cuneiform bone. There is minimal perihardware lucency at the head of the screw.     LEFT: There is diffuse edema of the soft tissues without radiopaque foreign body or gas. There is severe left knee osteoarthrosis with bone-on-bone contact and prominent osteophytosis. There is a metallic cerclage wire extending through the patella and the tibial tuberosity. Limited evaluation of the ankle due to contracture. There is severe hindfoot valgus. There is diffuse ankylosis of the hindfoot and midfoot articulations with abandoned arthrodesis hardware tracts noted. There is advanced tibiotalar osteoarthrosis. There is a remote fracture deformity of the distal fibular and metadiaphysis. No definite osseous destruction or erosive change. No evidence of acute fracture.       1. Diffuse edema of the bilateral legs without radiopaque foreign body or gas. No radiographic evidence of acute osteomyelitis.   2. Severe bilateral knee osteoarthrosis (right > left).   3. Diffuse ankylosis of the left hindfoot and midfoot articulations, severe left tibiotalar osteoarthrosis common severe hindfoot valgus.   Multiple potential etiologies of atraumatic pain as described in detail above, depending on location and clinical context.   MACRO: None.   Signed by: Jaziel  Selvin 6/5/2024 10:15 PM Dictation workstation:   [id number]    XR tibia fibula bilateral 2 views    Result Date: 6/5/2024  Interpreted By:  Jaziel Montalvo, STUDY: XR ANKLE BILATERAL COMPLETE MINIMUM 3 VIEWS; XR TIBIA FIBULA BILATERAL 2 VIEWS; ;  6/5/2024 8:26 pm   INDICATION: Signs/Symptoms:bilateral lower extremity wounds.   COMPARISON: None.   ACCESSION NUMBER(S): [id number]; [id number]   ORDERING CLINICIAN: LUDWIN MCNEILL   FINDINGS: Osteopenic bone.   RIGHT: There is diffuse edema of the right leg and ankle. No radiopaque foreign bodies or soft tissue gas. There is a rounded centrally lucent calcification of the lateral right thigh likely representing an area of fat necrosis. Scattered phleboliths in the pretibial soft tissues are likely reflective of venous stasis edema. No definite osseous destruction or erosive change. No acute fracture or malalignment. There is severe osteoarthrosis of the right knee with bone-on-bone contact and exuberant osteophyte formation. There is prominent dorsal midfoot spurring and 5th tarsometatarsal spurring. There is internal fixation hardware with a single screw extending from the 1st metatarsal diaphysis across the Lisfranc joint to the middle cuneiform bone. There is minimal perihardware lucency at the head of the screw.     LEFT: There is diffuse edema of the soft tissues without radiopaque foreign body or gas. There is severe left knee osteoarthrosis with bone-on-bone contact and prominent osteophytosis. There is a metallic cerclage wire extending through the patella and the tibial tuberosity. Limited evaluation of the ankle due to contracture. There is severe hindfoot valgus. There is diffuse ankylosis of the hindfoot and midfoot articulations with abandoned arthrodesis hardware tracts noted. There is advanced tibiotalar osteoarthrosis. There is a remote fracture deformity of the distal fibular and metadiaphysis. No definite osseous destruction or erosive  change. No evidence of acute fracture.       1. Diffuse edema of the bilateral legs without radiopaque foreign body or gas. No radiographic evidence of acute osteomyelitis.   2. Severe bilateral knee osteoarthrosis (right > left).   3. Diffuse ankylosis of the left hindfoot and midfoot articulations, severe left tibiotalar osteoarthrosis common severe hindfoot valgus.   Multiple potential etiologies of atraumatic pain as described in detail above, depending on location and clinical context.   MACRO: None.   Signed by: Jaziel Montalvo 6/5/2024 10:15 PM Dictation workstation:   [id number]    ECG 12 lead    Result Date: 6/4/2024  Normal sinus rhythm Possible Left atrial enlargement Left ventricular hypertrophy T wave abnormality, consider inferior ischemia Abnormal ECG When compared with ECG of 18-MAY-2024 06:51, Sinus rhythm has replaced Electronic atrial pacemaker Nonspecific T wave abnormality, worse in Anterolateral leads See ED provider note for full interpretation and clinical correlation    XR chest 1 view    Result Date: 6/4/2024  Interpreted By:  Nic Moseley, STUDY: XR CHEST 1 VIEW  6/4/2024 2:24 pm   INDICATION: Signs/Symptoms:peripheral edema, weakness   COMPARISON: 05/17/2024   ACCESSION NUMBER(S): [id number]   ORDERING CLINICIAN: CHAVO KIM   TECHNIQUE: A single AP portable radiograph of the chest was obtained.   FINDINGS: Multiple cardiac monitoring leads are seen over the chest.  A 2 lead pacer is seen over the left chest. No focal infiltrate, pleural effusion or pneumothorax is identified. The cardiac silhouette is mildly prominent, similar to prior studies.       No focal infiltrate or pneumothorax is identified.   MACRO: None.   Signed by: Nic Moseley 6/4/2024 2:39 PM Dictation workstation:   [id number]    ECG 12 lead STAT    Result Date: 5/18/2024  Atrial-paced rhythm Minimal voltage criteria for LVH, may be normal variant Abnormal ECG When compared with ECG of 16-MAY-2024 06:58,  (unconfirmed) Electronic atrial pacemaker has replaced Sinus rhythm Confirmed by Antonio Rodriges ([id number]) on 5/18/2024 11:28:44 AM    ECG 12 Lead    Result Date: 5/18/2024  Atrial-paced rhythm Voltage criteria for left ventricular hypertrophy Abnormal ECG When compared with ECG of 17-MAY-2024 08:03, (unconfirmed) No significant change was found Confirmed by Antonio Rodriges ([id number]) on 5/18/2024 11:28:48 AM    ECG 12 Lead    Result Date: 5/18/2024  Sinus bradycardia Voltage criteria for left ventricular hypertrophy Nonspecific T wave abnormality Abnormal ECG When compared with ECG of 15-MAY-2024 21:38, (unconfirmed) ST more depressed in Inferior leads Confirmed by Antonio Rodriges ([id number]) on 5/18/2024 11:28:37 AM    ECG 12 Lead    Result Date: 5/18/2024  Marked sinus bradycardia Voltage criteria for left ventricular hypertrophy Abnormal ECG When compared with ECG of 15-MAY-2024 21:39, (unconfirmed) ST no longer depressed in Anterior leads T wave inversion no longer evident in Anterolateral leads Confirmed by Antonio Rodriges ([id number]) on 5/18/2024 11:28:41 AM    XR chest 1 view    Result Date: 5/17/2024  Interpreted By:  Hay Tucker, STUDY: XR CHEST 1 VIEW;  5/17/2024 8:04 am   INDICATION: Signs/Symptoms:post implant.   COMPARISON: 05/10/2024.   ACCESSION NUMBER(S): [id number]   ORDERING CLINICIAN: LEIGHA DUGGAN   FINDINGS: CARDIOMEDIASTINAL SILHOUETTE: Patient is rotated. There has been interval placement of a left-sided dual lead cardiac pacing device. Lead tips overlie the right atrium and right ventricle regions respectively. Cardiomegaly and aortic calcifications are similar to prior.   LUNGS: Inspiratory volume is low. Interstitial prominence is present with irregular infiltrates in the infrahilar regions and lung bases. Small pleural effusions not excluded. No appreciable pneumothorax.   ABDOMEN: No remarkable upper abdominal findings.   BONES: No acute osseous changes.       1.  Interval placement of a left-sided dual lead  cardiac pacing device with leads overlying the right atrium and right ventricle levels respectively. No pneumothorax. 2. Low inspiratory volume. Interstitial prominence with irregular infiltrates in the infrahilar regions in the lung bases may be due to vascular congestion/edema. Infection not excluded.       MACRO: None.   Signed by: Hay Tucker 5/17/2024 8:18 AM Dictation workstation:   [id number]    Electrophysiology procedure    Result Date: 5/17/2024  Table formatting from the original result was not included. Procedure Details:  Permanent Pacemaker System Implantation Dual Chamber Summary: ·   Successful implantation of a dual-chamber permanent pacemaker. ·   The pacing and sensing thresholds were satisfactory. Recommendations: 1. A chest X-ray should be performed and telemetry monitoring continued for 24 hours. 2. A 12 lead ECG should be performed prior to discharge from the hospital. 3. The patient should continue with the present medications. Discharge: 1. The patient recovered uneventfully from the effects of conscious sedation. The patient left the EP laboratory in stable condition and was transferred to the telemetry unit . Follow up: 1. The patient will return to telemetry if recovery parameters are appropriate.  The patient should be alert for bleeding, swelling, or signs of infection. The patient should call the electrophysiologist immediately if symptoms recur, or for any problems. The patient has been instructed accordingly. 2. Follow up with [facility] office in seven days for post-operative wound assessment. 3. Follow up with [facility] in twelve weeks for routine device analysis and reprogramming if necessary. Remote monitoring will be instituted if possible. ________________________________________ Procedures: Pocket fashioned. Dual-chamber permanent pacemaker implantation. Device testing. ________________________________________ Patient history: Please refer to the detailed history and  physical on the patient's medical chart.  History of significant bradycardia, atrial fibrillation.  Coronary artery disease.  Patient is a scheduled for dual-chamber pacemaker implantation ________________________________________ Procedure narrative: The risks, benefits, and alternatives to the procedure and sedation were explained to the patient, and informed consent was obtained. The patient was in the fasting state. A grounding pad was placed. Self-adhesive anterior-posterior defibrillation pads were applied. A ZOLL defibrillator was used for monitoring and the defibrillator waveform was set to biphasic. The patient was set up for continuous monitoring of surface 12 lead ECG and pulse oximetry. Blood pressure was monitored. The procedure was performed under IV conscious sedation. The upper chest was prepped and draped in the usual sterile fashion. Local anesthesia: After preoperative IV antibiotic was completely infused, subcutaneous tissues just medial to the left deltopectoral area, were infiltrated with Lidocaine 1 % for local anesthesia. 1. Using a #15 scalpel, an incision was made, which was extended to the prepectoral fascia using blunt dissection.  The left cephalic vein was identified.  Using Jarrell's.  Venotomy was created.  The distal part was tied off.  The left cephalic vein was small and unable to accommodate the wire.  The proximal part was tied off again. 2. The  left subclavian vein was accessed using the Seldinger technique. 3. A guidewire was advanced to the heart under fluoroscopic guidance, a sheath was placed over the guidewire, and the guidewire was retained and the dilator was removed. A lead was then advanced to the heart via fluoroscopic guidance, and the sheath was peeled away. The ventricle was mapped, and the lead was fixed to the right ventricular low septum. After lead placement, appropriate sensing and thresholds were obtained.  No diaphragmatic pacing occurred at 10V and 1.5ms.  A  second sheath was placed over the wire and the dilator were  removed. A pacemaker lead was advanced to the heart under fluoroscopy guidance and the sheath was peeled away. The lead was placed in the right atrial appendage. After lead placement, appropriate sensing and threshols were obtained. No diaphragmatic pacing occurred at 10V and 1.5ms. 4. The lead was sutured in place to the pectoralis muscle using O-Ethibond suture. Hemostasis was obtained with bovie cautery, one purse string sutures of O-Ethibond, and Vilma. Lead measurements were reevaluated. 5. A left prepectoral pocket was fashioned. 6. A dual-chamber permanent pacemaker was attached to the lead and implanted. 7. The device was interrogated and its parameters recorded; telemetered electrograms and pacing and sensing thresholds were measured. 8. The pocket was flushed with saline and vancomycin. Wound hemostasis was obtained with electrocautery. The wound was closed in three layers. The skin was approximated with subcuticular suture and steri-strips. A pressure dressing was applied. The patient was transferred to the telemetry unit. ________________________________________ Complications: The patient tolerated the procedure without any complications or incident. ________________________________________ Prepared and signed by Tolerance: good Complications: None Estimated blood loss 10 cc Device implantation [employer] [place] XT DR MRI model number W1  number [id number].  Implanted May 17, 2024. Right atrial lead. Medtronic 5076/45.  Send number PJN ASF [id number].  Implanted May 17, 2024.  Location right and appendage.  Capture 1.4 at 0.4 ms.  Impedance 760 ohms.  EGM amplitude 1.7 mV.  Diaphragmatic stimulation none.  Status active. Right ventricular lead Medtronic 507 6-52.  Serial number PJ and AR is [id number].  Implanted May 17, 2024.  Location right ventricular low septum.  Capture 0.8 V at 0.4 ms.  Impedance 513 ohms.  EGM amplitude 10.0 mV.  Diaphragmatic  stimulation none.  Status active.     ECG 12 Lead    Result Date: 5/15/2024  Sinus bradycardia with Premature atrial complexes Voltage criteria for left ventricular hypertrophy Abnormal ECG When compared with ECG of 14-MAY-2024 07:04, (unconfirmed) Premature atrial complexes are now Present Confirmed by Michael Arenas ([id number]) on 5/15/2024 10:46:15 AM    ECG 12 Lead    Result Date: 5/15/2024  Sinus bradycardia Possible Left atrial enlargement Left ventricular hypertrophy Nonspecific ST abnormality Abnormal ECG When compared with ECG of 13-MAY-2024 06:33, (unconfirmed) No significant change was found Confirmed by Michael Arenas ([id number]) on 5/15/2024 10:37:55 AM    ECG 12 Lead    Result Date: 5/15/2024  Sinus bradycardia Voltage criteria for left ventricular hypertrophy Nonspecific ST changes Abnormal ECG When compared with ECG of 12-MAY-2024 06:38, Sinus rhythm has replaced Atrial fibrillation Confirmed by Michael Arenas ([id number]) on 5/15/2024 10:26:42 AM    Bedside Midline Imaging    Result Date: 5/13/2024  These images are not reportable by radiology and will not be interpreted by  Radiologists.    ECG 12 Lead    Result Date: 5/12/2024  Atrial fibrillation Voltage criteria for left ventricular hypertrophy ST & T wave abnormality, consider inferior ischemia Abnormal ECG When compared with ECG of 12-MAY-2024 01:59, (unconfirmed) No significant change was found Confirmed by Antonio Rodriges ([id number]) on 5/12/2024 10:06:57 AM    ECG 12 Lead    Result Date: 5/12/2024  Atrial fibrillation Moderate voltage criteria for LVH, may be normal variant Nonspecific ST and T wave abnormality Abnormal ECG When compared with ECG of 11-MAY-2024 07:08, Atrial fibrillation has replaced Sinus rhythm Non-specific change in ST segment in Lateral leads Confirmed by Antonio Rodriges ([id number]) on 5/12/2024 10:06:48 AM    MR brain w and wo IV contrast    Result Date: 5/11/2024  Interpreted By:  Emelia George, STUDY: MR BRAIN W AND WO IV CONTRAST;  5/11/2024 5:51 pm   INDICATION: Signs/Symptoms:seizure, change in MS.   COMPARISON: CT head from 05/07/2024   ACCESSION NUMBER(S): [id number]   ORDERING CLINICIAN: VINCENT LOPEZ   TECHNIQUE: Axial T2, FLAIR, DWI, gradient echo T2 and T1 weighted images of brain were acquired. Post contrast T1 weighted images were acquired after administration of  gadolinium based intravenous contrast.   FINDINGS: There is no diffusion restriction abnormality to suggest acute infarct.   There is an area of encephalomalacia within the left occipital lobe with patchy hemosiderin deposition.   There are patchy areas of T2 and FLAIR hyperintense signal within bilateral periventricular and subcortical white matter which likely reflect sequela of chronic small vessel ischemic change. Another small area of cortical encephalomalacia is noted within the parasagittal posterior right frontal lobe as well as within the left parietal lobe.   Minimal nonspecific white matter changes are noted within the ryan. 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   Postcontrast imaging demonstrates no focus of abnormal parenchymal or leptomeningeal enhancement.   Visualized paranasal sinuses are clear. Mild opacification of bilateral mastoid air cells is noted.       No evidence of acute infarct, intracranial mass effect or midline shift. Multiple chronic findings as detailed.   Signed by: Emelia George 5/11/2024 6:44 PM Dictation workstation:   [id number]    ECG 12 Lead    Result Date: 5/11/2024  Normal sinus rhythm Moderate voltage criteria for LVH, may be normal variant T wave abnormality, consider inferior ischemia Abnormal ECG When compared with ECG of 10-MAY-2024 10:52, (unconfirmed) ST no longer depressed in Inferior leads Nonspecific T wave abnormality now evident in Anterolateral leads Confirmed by Antonio Rodriges ([id number]) on 5/11/2024 10:13:04 AM    ECG 12 Lead    Result Date: 5/11/2024  Sinus bradycardia Moderate  voltage criteria for LVH, may be normal variant Borderline ECG When compared with ECG of 09-MAY-2024 09:01, Nonspecific T wave abnormality no longer evident in Anterior leads Confirmed by Antonio Rodriges ([id number]) on 5/11/2024 10:12:50 AM    Transthoracic Echo (TTE) Limited    Result Date: 5/10/2024          [place] [id number]  Tel [phone] Fax [phone] TRANSTHORACIC ECHOCARDIOGRAM REPORT  Patient Name:      LISBET ZARAGOZA NENITA      Reading Physician:    [id number] Bacilio Sommers DO Study Date:        5/10/2024             Ordering Provider:    [id number] SAMANTHA ANGELES MRN/PID:           [id number]              Fellow: Accession#:        [id number]          Nurse: Date of Birth/Age: 1960 / 63 years Sonographer:          Shahana Osullivan RDCS Gender:            F                     Additional Staff: Height:            165.10 cm             Admit Date:           5/7/2024 Weight:            102.06 kg             Admission Status:     Inpatient -                                                                Routine BSA / BMI:         2.08 m2 / 37.44 kg/m2 Department Location:  [facility] Blood Pressure: 167 /53 mmHg Study Type:    TRANSTHORACIC ECHO (TTE) LIMITED Diagnosis/ICD: Bradycardia, unspecified-R00.1 Indication:    Abnormal EKG CPT Codes:     Echo Limited-01623; Color Doppler-86022; Doppler Limited-65137 Patient History: Pertinent History: HTN, Hyperlipidemia, A-Fib, Cardiomyopathy and Lupus. Study Detail: The following Echo studies were performed: 2D, M-Mode, Doppler and               color flow. Technically challenging study due to patient lying in               supine position and the patient's lack of  cooperation.  PHYSICIAN INTERPRETATION: Left Ventricle: Left ventricular systolic function is mildly to moderately decreased, with an estimated ejection fraction of 40-45%. There is global hypokinesis of the left ventricle with minor regional variations. The left ventricular cavity size is normal. There is moderate concentric left ventricular hypertrophy. Left ventricular diastolic filling was not assessed. Left Atrium: The left atrium is upper limits of normal in size. Right Ventricle: The right ventricle is normal in size. There is normal right ventricular global systolic function. Right Atrium: The right atrium is normal in size. Aortic Valve: The aortic valve appears structurally normal. The aortic valve appears tricuspid. There is no evidence of aortic valve stenosis. There is no evidence of aortic valve regurgitation. Mitral Valve: The mitral valve is normal in structure. There is no evidence of mitral valve stenosis. There is normal mitral valve leaflet mobility. There is mild to moderate mitral valve regurgitation. Tricuspid Valve: The tricuspid valve is structurally normal. There is normal tricuspid valve leaflet mobility. There is mild tricuspid regurgitation. Pulmonic Valve: The pulmonic valve is structurally normal. There is no indication of pulmonic valve regurgitation. Pericardium: There is no pericardial effusion noted. Aorta: The aortic root is normal. Pulmonary Artery: The pulmonary artery is not well visualized. Pulmonary hypertension is present. The tricuspid regurgitant velocity is 3.79 m/s, and with an estimated right atrial pressure of 3 mmHg, the estimated pulmonary artery pressure is severely elevated with the RVSP at 60.5 mmHg. Systemic Veins: The inferior vena cava was not well visualized. In comparison to the previous echocardiogram(s): Prior examinations are available and were reviewed for comparison purposes. The left ventricular function is unchanged. The left ventricular hypertrophy is  worse.  CONCLUSIONS:  1. Left ventricular systolic function is mildly to moderately decreased with a 40-45% estimated ejection fraction.  2. There is moderate concentric left ventricular hypertrophy.  3. There is no evidence of mitral valve stenosis.  4. Mild to moderate mitral valve regurgitation.  5. Mild tricuspid regurgitation is visualized.  6. Aortic valve stenosis is not present.  7. Pulmonary hypertension is present.  8. Severely elevated pulmonary artery pressure.  9. The pulmonary artery is not well visualized. 10. There is global hypokinesis of the left ventricle with minor regional variations. RECOMMENDATIONS: Technically suboptimal and limited study, therefore accuracy of above interpretation could be substantially diminished. Clinical correlation is advised. Consider additional imaging modalities if clinically indicated.  QUANTITATIVE DATA SUMMARY: 2D MEASUREMENTS:                           Normal Ranges: LAs:           3.90 cm    (2.7-4.0cm) IVSd:          1.32 cm    (0.6-1.1cm) LVPWd:         1.37 cm    (0.6-1.1cm) LVIDd:         4.81 cm    (3.9-5.9cm) LVIDs:         3.87 cm LV Mass Index: 124.5 g/m2 LV % FS        19.5 % LV SYSTOLIC FUNCTION BY 2D PLANIMETRY (MOD):                     Normal Ranges: EF-A4C View: 39.2 % (>=55%) EF-A2C View: 42.1 % EF-Biplane:  40.8 % TRICUSPID VALVE/RVSP:                             Normal Ranges: Peak TR Velocity: 3.79 m/s RV Syst Pressure: 60.5 mmHg (< 30mmHg)  [id number] Bacilio Sommers DO Electronically signed on 5/10/2024 at 10:57:55 PM  ** Final **     XR chest 1 view    Result Date: 5/10/2024  Interpreted By:  Ceci Westbrook, STUDY: XR CHEST 1 VIEW;  5/10/2024 7:00 pm   INDICATION: Signs/Symptoms:Line placement   COMPARISON: Chest x-ray 05/07/2024.   ACCESSION NUMBER(S): [id number]   ORDERING CLINICIAN: GENIE BRIGHT   TECHNIQUE: Portable frontal view of the chest was obtained .   FINDINGS: Monitoring wires and radiopaque densities are overlying the patient.    There is a left IJ central line with the tip overlying the region of the right atrium. There is radiopaque tubing at the soft tissues at the right side of the neck.   The heart is enlarged. Atherosclerotic calcifications are noted at the thoracic aorta. There is vascular congestion. There are bilateral airspace opacities. There are small bilateral pleural effusions. No pneumothorax.   Degenerative changes at the bilateral glenohumeral joints.       1. Left IJ central line with the tip overlying the region of the right atrium. No radiographic evidence for pneumothorax. 2. Radiopaque tubing at the soft tissues of the right side of the neck, may represent a peripherally inserted catheter. Clinical correlation recommended. 3. Cardiomegaly. Vascular congestion. Bilateral airspace opacities, may be secondary to pulmonary edema and/or pneumonia.       MACRO: None.   Signed by: Ceci Westbrook 5/10/2024 8:06 PM Dictation workstation:   [id number]    FL guided lumbar puncture    Result Date: 5/10/2024  Interpreted By:  Hay Tucker, STUDY: FL GUIDED LUMBAR PUNCTURE;  5/10/2024 1:59 pm   INDICATION: Signs/Symptoms:Encephalopathy, needs LP.   COMPARISON: CT abdomen/pelvis of 04/25/2024.   ACCESSION NUMBER(S): [id number]   ORDERING CLINICIAN: ONELIA GREGORY   FINDINGS: PROCEDURE: After discussion of risks, benefits, and alternatives, oral and written informed consent was obtained.   The patient was placed in prone position on exam table. There were procedure limitations related to limited patient cooperation and agitation. There is also lumbar scoliosis and multilevel degenerative changes of the spine.   The L3-4 interspace was then marked under fluoroscopy. The patient was then prepped and draped in sterile fashion. Local anesthesia was achieved with 2% Lidocaine solution. A 22 gauge spinal needle was then introduced at the L3-L4 level under direct fluoroscopic guidance. There was free return of slightly blood-tinged  CSF. Initially approximately 0.5 mL of fluid was able to be collected although the flow of CSF then stopped. Needle was slightly repositioned although additional CSF flow was not able to be achieved. Patient was becoming more agitated and it was decided that the procedure be stopped at this point. The stylet was then replaced and the spinal needle was removed.  Hemostasis was achieved with direct pressure and the area was dressed with a Band-Aid.   Patient was sent back to inpatient floor for routine recovery.   Total fluoroscopy time was 32 seconds.   Total saved images:  1.   The small volume of CSF that was collected was placed in a single sterile vial and submitted for laboratory analysis.       Fluoroscopic guided lumbar puncture attempt. Only a very small volume of slightly blood-tinged CSF was able to be obtained and then flow of fluid stopped. The small volume of fluid obtained was submitted for laboratory analysis.   MACRO: None.   Signed by: Hay Tucker 5/10/2024 2:10 PM Dictation workstation:   [id number]    EEG    IMPRESSION Impression This vEEG is indicative of moderate diffuse encephalopathy. No epileptiform discharges or lateralizing signs are recorded. Multiple non epileptic head clonic events occurred throughout the recording. A full report will be scanned into the patient's chart at a later time. This report has been interpreted and electronically signed by    ECG 12 Lead    Result Date: 5/9/2024  Marked sinus bradycardia Voltage criteria for left ventricular hypertrophy Abnormal ECG When compared with ECG of 07-MAY-2024 13:54, (unconfirmed) Vent. rate has decreased BY  26 BPM Nonspecific T wave abnormality no longer evident in Lateral leads QT has shortened Confirmed by Antonio Rodriges ([id number]) on 5/9/2024 1:14:57 PM    Electrocardiogram, 12-lead PRN ACS symptoms    Result Date: 5/9/2024  Normal sinus rhythm Voltage criteria for left ventricular hypertrophy Nonspecific ST and T wave abnormality  Abnormal ECG When compared with ECG of 26-APR-2024 12:24, QT has lengthened See ED provider note for full interpretation and clinical correlation Confirmed by Antonio Rodriges ([id number]) on 5/9/2024 1:09:51 PM    CT head wo IV contrast    Result Date: 5/7/2024  Interpreted By:  Higinio Sanchez, STUDY: CT HEAD WO IV CONTRAST;  5/7/2024 5:01 pm   INDICATION: Signs/Symptoms:AMS.   COMPARISON: 04/26/2024   ACCESSION NUMBER(S): [id number]   ORDERING CLINICIAN: GRAHAM HENDERSON   TECHNIQUE: Noncontrast axial CT scan of head was performed. Angled reformats in brain and bone windows were generated. The images were reviewed in bone, brain, blood and soft tissue windows.   FINDINGS: The ventricles, cisterns and sulci are prominent, consistent with mild diffuse volume loss. There are areas of nonspecific white matter hypodensity, which are probably age-related or microvascular in nature.   Gray-white differentiation is intact and there is no evidence of acute cortical infarct. Hypodensity in the left occipital lobe is unchanged and similar the prior exam. No mass, mass effect or midline shift is seen. There is no evidence of hemorrhage.   The visualized paranasal sinuses are clear.         No evidence of acute cortical infarct or intracranial hemorrhage.   Stable chronic changes.   MACRO: None   Signed by: Higinio Sanchez 5/7/2024 5:43 PM Dictation workstation:   [id number]    XR chest 1 view    Result Date: 5/7/2024  Interpreted By:  Nic Moseley, STUDY: XR CHEST 1 VIEW  5/7/2024 1:32 pm   INDICATION: Signs/Symptoms:weak   COMPARISON: 04/24/2024   ACCESSION NUMBER(S): [id number]   ORDERING CLINICIAN: GRAHAM HENDERSON   TECHNIQUE: A single AP portable radiograph of the chest was obtained.   FINDINGS: Mild diffuse interstitial infiltrates are seen bilaterally, and may represent edema and/or pneumonia. No pneumothorax is identified. The cardiac silhouette is mildly prominent, similar to prior studies.       Diffuse interstitial  infiltrates bilaterally, as above. Clinical correlation and continued follow-up until clearing is recommended.   MACRO: None.   Signed by: Nic Moseley 5/7/2024 2:07 PM Dictation workstation:   [id number]            Assessment/Plan   ASSESSMENT & PLAN:    #Bullous Systemic Lupus Erythematous vs Venous wounds secondary to severe leg edema   - Patient was seen and evaluated; all findings were discussed and all questions were answered to patient's satisfaction.  - Charts, labs, vitals and imaging all reviewed.   - Imaging: bilateral tib/fib and ankle xray pending  - Labs: reviewed  - PVRs: 10/2023   RIGHT Lower PVR                Pressures Ratios  Right Posterior Tibial (Ankle) 141 mmHg  1.13  Right Dorsalis Pedis (Ankle)   138 mmHg  1.10  Right Digit (Great Toe)        133 mmHg  1.06    LEFT Lower PVR                Pressures Ratios  Left Posterior Tibial (Ankle) 136 mmHg  1.09  Left Dorsalis Pedis (Ankle)   142 mmHg  1.14  Left Digit (Great Toe)        171 mmHg  1.37    - Blood culture: pending    Plan:  - Abx: Cefepime and Flagyl  - Pain Regimen: Norco  - Dressings: Xeroform, fluffy Kerlix, and lightly ACE wrapped  - Nursing staff is able to change/reinforce dressing if & as necessary until next day’s dressing change. Thank you.  - Podiatry will continue to follow while in house.    DVT ppx: Eliquis  Weightbearing: WBAT  Discharge: pending further evaluation    Case to be discussed with attending, A&P above reflects a tentative plan. Please await for the final signature from the attending physician on service.       I was the supervising physician in the delivery of the service.     I personally examined and evaluated this patient on 6-6-2024.  Discussed with patient that there was no evidence of osteomyelitis or deeper infection to the bone on x-ray.  Discussed with patient that the rapid swelling in the legs may be secondary to a venous issue and or medical lab change or medicine.  Also discussed that systemic  lupus can sometimes manifest in bulla eruptions on the lower extremity.  Patient states that she follows a rheumatologist for her lupus at this time.  Legs appear to have went down since admission.  No need for surgical intervention at this time per the podiatry team.  Wounds dressed with Xeroform, 4 x 4's, ABD, Kerlix and Ace wrap.  Daily dressing changes may be performed.  At this time podiatry will follow from the periphery with follow-up in the wound care center within 1 week postdischarge.    A total of 60 minutes was spent in formulation of this note, review of charts, labs,  imaging with a minimum of 50% of the time spent in face to face with with the patient.  All questions and concerns were answered to the patients satisfaction.       Off note, use of vocal Dragon dictation system was used to dictate this document.  All proper spelling and grammatical errors may not have been corrected prior to final submission.      Tsering Rodriguez DPM        SIGNATURE: JIM Rose-ROSA ISELA PATIENT NAME: Bing Holliday   DATE: June 5, 2024 MRN: [id number]   TIME: 10:19 PM CONTACT: Haiku message

## 2024-06-07 LAB
ALBUMIN SERPL BCP-MCNC: 2.9 G/DL (ref 3.4–5)
ANION GAP SERPL CALC-SCNC: 12 MMOL/L (ref 10–20)
ANION GAP SERPL CALC-SCNC: 14 MMOL/L (ref 10–20)
BUN SERPL-MCNC: 44 MG/DL (ref 6–23)
BUN SERPL-MCNC: 45 MG/DL (ref 6–23)
CALCIUM SERPL-MCNC: 8.8 MG/DL (ref 8.6–10.3)
CALCIUM SERPL-MCNC: 9 MG/DL (ref 8.6–10.3)
CHLORIDE SERPL-SCNC: 106 MMOL/L (ref 98–107)
CHLORIDE SERPL-SCNC: 106 MMOL/L (ref 98–107)
CO2 SERPL-SCNC: 28 MMOL/L (ref 21–32)
CO2 SERPL-SCNC: 30 MMOL/L (ref 21–32)
CREAT SERPL-MCNC: 2.1 MG/DL (ref 0.5–1.05)
CREAT SERPL-MCNC: 2.15 MG/DL (ref 0.5–1.05)
EGFRCR SERPLBLD CKD-EPI 2021: 25 ML/MIN/1.73M*2
EGFRCR SERPLBLD CKD-EPI 2021: 26 ML/MIN/1.73M*2
ERYTHROCYTE [DISTWIDTH] IN BLOOD BY AUTOMATED COUNT: 19.4 % (ref 11.5–14.5)
ERYTHROCYTE [DISTWIDTH] IN BLOOD BY AUTOMATED COUNT: 19.5 % (ref 11.5–14.5)
GLUCOSE BLD MANUAL STRIP-MCNC: 219 MG/DL (ref 74–99)
GLUCOSE BLD MANUAL STRIP-MCNC: 90 MG/DL (ref 74–99)
GLUCOSE BLD MANUAL STRIP-MCNC: 96 MG/DL (ref 74–99)
GLUCOSE SERPL-MCNC: 134 MG/DL (ref 74–99)
GLUCOSE SERPL-MCNC: 137 MG/DL (ref 74–99)
HCT VFR BLD AUTO: 27.9 % (ref 36–46)
HCT VFR BLD AUTO: 28.4 % (ref 36–46)
HGB BLD-MCNC: 8.5 G/DL (ref 12–16)
HGB BLD-MCNC: 8.5 G/DL (ref 12–16)
HOLD SPECIMEN: NORMAL
MCH RBC QN AUTO: 29.9 PG (ref 26–34)
MCH RBC QN AUTO: 30.6 PG (ref 26–34)
MCHC RBC AUTO-ENTMCNC: 29.9 G/DL (ref 32–36)
MCHC RBC AUTO-ENTMCNC: 30.5 G/DL (ref 32–36)
MCV RBC AUTO: 100 FL (ref 80–100)
MCV RBC AUTO: 100 FL (ref 80–100)
NRBC BLD-RTO: 0 /100 WBCS (ref 0–0)
NRBC BLD-RTO: 0.5 /100 WBCS (ref 0–0)
PHOSPHATE SERPL-MCNC: 4.7 MG/DL (ref 2.5–4.9)
PLATELET # BLD AUTO: 85 X10*3/UL (ref 150–450)
PLATELET # BLD AUTO: 93 X10*3/UL (ref 150–450)
POTASSIUM SERPL-SCNC: 4.8 MMOL/L (ref 3.5–5.3)
POTASSIUM SERPL-SCNC: 4.8 MMOL/L (ref 3.5–5.3)
RBC # BLD AUTO: 2.78 X10*6/UL (ref 4–5.2)
RBC # BLD AUTO: 2.84 X10*6/UL (ref 4–5.2)
SODIUM SERPL-SCNC: 143 MMOL/L (ref 136–145)
SODIUM SERPL-SCNC: 143 MMOL/L (ref 136–145)
WBC # BLD AUTO: 3.7 X10*3/UL (ref 4.4–11.3)
WBC # BLD AUTO: 5.1 X10*3/UL (ref 4.4–11.3)

## 2024-06-07 PROCEDURE — 2500000001 HC RX 250 WO HCPCS SELF ADMINISTERED DRUGS (ALT 637 FOR MEDICARE OP): Performed by: STUDENT IN AN ORGANIZED HEALTH CARE EDUCATION/TRAINING PROGRAM

## 2024-06-07 PROCEDURE — 2500000001 HC RX 250 WO HCPCS SELF ADMINISTERED DRUGS (ALT 637 FOR MEDICARE OP): Performed by: INTERNAL MEDICINE

## 2024-06-07 PROCEDURE — 36415 COLL VENOUS BLD VENIPUNCTURE: CPT | Performed by: STUDENT IN AN ORGANIZED HEALTH CARE EDUCATION/TRAINING PROGRAM

## 2024-06-07 PROCEDURE — 80048 BASIC METABOLIC PNL TOTAL CA: CPT | Mod: CCI | Performed by: INTERNAL MEDICINE

## 2024-06-07 PROCEDURE — 2500000004 HC RX 250 GENERAL PHARMACY W/ HCPCS (ALT 636 FOR OP/ED): Performed by: STUDENT IN AN ORGANIZED HEALTH CARE EDUCATION/TRAINING PROGRAM

## 2024-06-07 PROCEDURE — 85027 COMPLETE CBC AUTOMATED: CPT | Performed by: STUDENT IN AN ORGANIZED HEALTH CARE EDUCATION/TRAINING PROGRAM

## 2024-06-07 PROCEDURE — 1210000001 HC SEMI-PRIVATE ROOM DAILY

## 2024-06-07 PROCEDURE — 2500000002 HC RX 250 W HCPCS SELF ADMINISTERED DRUGS (ALT 637 FOR MEDICARE OP, ALT 636 FOR OP/ED): Performed by: STUDENT IN AN ORGANIZED HEALTH CARE EDUCATION/TRAINING PROGRAM

## 2024-06-07 PROCEDURE — 82947 ASSAY GLUCOSE BLOOD QUANT: CPT

## 2024-06-07 PROCEDURE — 85027 COMPLETE CBC AUTOMATED: CPT | Mod: 91 | Performed by: STUDENT IN AN ORGANIZED HEALTH CARE EDUCATION/TRAINING PROGRAM

## 2024-06-07 PROCEDURE — 80069 RENAL FUNCTION PANEL: CPT | Performed by: STUDENT IN AN ORGANIZED HEALTH CARE EDUCATION/TRAINING PROGRAM

## 2024-06-07 PROCEDURE — 99232 SBSQ HOSP IP/OBS MODERATE 35: CPT | Performed by: STUDENT IN AN ORGANIZED HEALTH CARE EDUCATION/TRAINING PROGRAM

## 2024-06-07 RX ORDER — TORSEMIDE 20 MG/1
40 TABLET ORAL DAILY
Status: DISCONTINUED | OUTPATIENT
Start: 2024-06-07 | End: 2024-06-16

## 2024-06-07 RX ORDER — MORPHINE SULFATE 2 MG/ML
2 INJECTION, SOLUTION INTRAMUSCULAR; INTRAVENOUS ONCE
Status: DISCONTINUED | OUTPATIENT
Start: 2024-06-07 | End: 2024-06-07

## 2024-06-07 RX ADMIN — CEFEPIME 1 G: 1 INJECTION, POWDER, FOR SOLUTION INTRAMUSCULAR; INTRAVENOUS at 11:09

## 2024-06-07 RX ADMIN — LEVETIRACETAM 500 MG: 500 TABLET, FILM COATED ORAL at 04:48

## 2024-06-07 RX ADMIN — MYCOPHENOLATE MOFETIL 1000 MG: 250 CAPSULE ORAL at 10:55

## 2024-06-07 RX ADMIN — Medication 5 MG: at 20:29

## 2024-06-07 RX ADMIN — FLUDROCORTISONE ACETATE 0.1 MG: 0.1 TABLET ORAL at 10:53

## 2024-06-07 RX ADMIN — METOPROLOL TARTRATE 25 MG: 25 TABLET, FILM COATED ORAL at 10:54

## 2024-06-07 RX ADMIN — PANTOPRAZOLE SODIUM 40 MG: 40 TABLET, DELAYED RELEASE ORAL at 05:47

## 2024-06-07 RX ADMIN — METOPROLOL TARTRATE 25 MG: 25 TABLET, FILM COATED ORAL at 20:29

## 2024-06-07 RX ADMIN — HYDROCODONE BITARTRATE AND ACETAMINOPHEN 1 TABLET: 5; 325 TABLET ORAL at 05:47

## 2024-06-07 RX ADMIN — TIOTROPIUM BROMIDE INHALATION SPRAY 2 PUFF: 3.12 SPRAY, METERED RESPIRATORY (INHALATION) at 10:54

## 2024-06-07 RX ADMIN — FUROSEMIDE 40 MG: 10 INJECTION, SOLUTION INTRAMUSCULAR; INTRAVENOUS at 04:48

## 2024-06-07 RX ADMIN — FLUTICASONE FUROATE AND VILANTEROL TRIFENATATE 1 PUFF: 200; 25 POWDER RESPIRATORY (INHALATION) at 10:54

## 2024-06-07 RX ADMIN — INSULIN LISPRO 4 UNITS: 100 INJECTION, SOLUTION INTRAVENOUS; SUBCUTANEOUS at 17:49

## 2024-06-07 RX ADMIN — Medication 100 MG: at 10:54

## 2024-06-07 RX ADMIN — ATORVASTATIN CALCIUM 40 MG: 20 TABLET, FILM COATED ORAL at 20:29

## 2024-06-07 RX ADMIN — LEVETIRACETAM 500 MG: 500 TABLET, FILM COATED ORAL at 17:49

## 2024-06-07 RX ADMIN — FOLIC ACID 1 MG: 1 TABLET ORAL at 10:54

## 2024-06-07 RX ADMIN — HYDROCORTISONE 20 MG: 5 TABLET ORAL at 10:54

## 2024-06-07 RX ADMIN — APIXABAN 2.5 MG: 5 TABLET, FILM COATED ORAL at 20:29

## 2024-06-07 RX ADMIN — TORSEMIDE 40 MG: 20 TABLET ORAL at 12:02

## 2024-06-07 RX ADMIN — INSULIN GLARGINE 10 UNITS: 100 INJECTION, SOLUTION SUBCUTANEOUS at 10:55

## 2024-06-07 RX ADMIN — OLANZAPINE 5 MG: 5 TABLET, FILM COATED ORAL at 20:29

## 2024-06-07 RX ADMIN — MYCOPHENOLATE MOFETIL 1000 MG: 250 CAPSULE ORAL at 20:31

## 2024-06-07 RX ADMIN — APIXABAN 2.5 MG: 5 TABLET, FILM COATED ORAL at 10:53

## 2024-06-07 ASSESSMENT — PAIN - FUNCTIONAL ASSESSMENT
PAIN_FUNCTIONAL_ASSESSMENT: 0-10

## 2024-06-07 ASSESSMENT — COGNITIVE AND FUNCTIONAL STATUS - GENERAL
MOBILITY SCORE: 13
MOVING TO AND FROM BED TO CHAIR: A LOT
DRESSING REGULAR LOWER BODY CLOTHING: A LITTLE
MOVING FROM LYING ON BACK TO SITTING ON SIDE OF FLAT BED WITH BEDRAILS: A LITTLE
CLIMB 3 TO 5 STEPS WITH RAILING: A LOT
PERSONAL GROOMING: A LOT
DAILY ACTIVITIY SCORE: 14
HELP NEEDED FOR BATHING: A LOT
DRESSING REGULAR UPPER BODY CLOTHING: A LOT
TOILETING: A LOT
TURNING FROM BACK TO SIDE WHILE IN FLAT BAD: A LOT
STANDING UP FROM CHAIR USING ARMS: A LOT
EATING MEALS: A LITTLE
WALKING IN HOSPITAL ROOM: A LOT

## 2024-06-07 ASSESSMENT — PAIN SCALES - GENERAL
PAINLEVEL_OUTOF10: 0 - NO PAIN
PAINLEVEL_OUTOF10: 0 - NO PAIN

## 2024-06-07 NOTE — PROGRESS NOTES
"Bing Holliday is a 63 y.o. female on day 3 of admission presenting with Cellulitis of leg without foot.    Subjective   Pt says she is feeling the same. No overnight events.        Objective     Physical Exam  Vitals and nursing note reviewed.   Constitutional:       General: She is not in acute distress.     Appearance: Normal appearance. She is not ill-appearing or toxic-appearing.   HENT:      Head: Normocephalic and atraumatic.      Nose: Nose normal.      Mouth/Throat:      Mouth: Mucous membranes are moist.   Eyes:      Extraocular Movements: Extraocular movements intact.      Conjunctiva/sclera: Conjunctivae normal.      Pupils: Pupils are equal, round, and reactive to light.   Cardiovascular:      Rate and Rhythm: Normal rate and regular rhythm.      Heart sounds: No murmur heard.     No gallop.   Pulmonary:      Effort: Pulmonary effort is normal. No respiratory distress.      Breath sounds: Normal breath sounds. No wheezing, rhonchi or rales.   Abdominal:      General: Abdomen is flat. Bowel sounds are normal. There is no distension.      Palpations: Abdomen is soft. There is no mass.      Tenderness: There is no abdominal tenderness.   Musculoskeletal:         General: Swelling and tenderness present.      Cervical back: Normal range of motion and neck supple.      Comments: B/l lower extremities are wrapped   Skin:     General: Skin is warm and dry.   Neurological:      General: No focal deficit present.      Mental Status: She is alert and oriented to person, place, and time. Mental status is at baseline.   Psychiatric:         Mood and Affect: Mood normal.         Behavior: Behavior normal.         Thought Content: Thought content normal.         Judgment: Judgment normal.         Last Recorded Vitals:  /59 (Patient Position: Lying)   Pulse 60   Temp 35.3 °C (95.5 °F) (Temporal)   Resp 16   Ht 1.778 m (5' 10\")   Wt 95.3 kg (210 lb)   LMP  (LMP Unknown)   SpO2 99%   BMI 30.13 kg/m²  "     Scheduled medications:  apixaban, 2.5 mg, oral, BID  atorvastatin, 40 mg, oral, Nightly  cefepime, 1 g, intravenous, q24h  fludrocortisone, 0.1 mg, oral, Every other day  tiotropium, 2 puff, inhalation, Daily   And  fluticasone furoate-vilanteroL, 1 puff, inhalation, Daily  folic acid, 1 mg, oral, Daily  hydrocortisone, 20 mg, oral, Daily  insulin glargine, 10 Units, subcutaneous, q AM  insulin lispro, 0-10 Units, subcutaneous, TID  levETIRAcetam, 500 mg, oral, q12h  melatonin, 5 mg, oral, Nightly  metoprolol tartrate, 25 mg, oral, BID  metroNIDAZOLE, 500 mg, intravenous, Once  morphine, 2 mg, intravenous, Once  mycophenolate, 1,000 mg, oral, BID  OLANZapine, 5 mg, oral, Nightly  pantoprazole, 40 mg, oral, Daily  thiamine, 100 mg, oral, Daily  torsemide, 40 mg, oral, Daily      Continuous medications:     PRN medications:  PRN medications: acetaminophen **OR** acetaminophen, dextrose, dextrose, glucagon, glucagon, HYDROcodone-acetaminophen, meclizine     Relevant Results:  Results for orders placed or performed during the hospital encounter of 06/04/24 (from the past 24 hour(s))   POCT GLUCOSE   Result Value Ref Range    POCT Glucose 92 74 - 99 mg/dL   POCT GLUCOSE   Result Value Ref Range    POCT Glucose 137 (H) 74 - 99 mg/dL   CBC   Result Value Ref Range    WBC 3.7 (L) 4.4 - 11.3 x10*3/uL    nRBC 0.5 (H) 0.0 - 0.0 /100 WBCs    RBC 2.78 (L) 4.00 - 5.20 x10*6/uL    Hemoglobin 8.5 (L) 12.0 - 16.0 g/dL    Hematocrit 27.9 (L) 36.0 - 46.0 %     80 - 100 fL    MCH 30.6 26.0 - 34.0 pg    MCHC 30.5 (L) 32.0 - 36.0 g/dL    RDW 19.5 (H) 11.5 - 14.5 %    Platelets 85 (L) 150 - 450 x10*3/uL   Basic Metabolic Panel   Result Value Ref Range    Glucose 137 (H) 74 - 99 mg/dL    Sodium 143 136 - 145 mmol/L    Potassium 4.8 3.5 - 5.3 mmol/L    Chloride 106 98 - 107 mmol/L    Bicarbonate 30 21 - 32 mmol/L    Anion Gap 12 10 - 20 mmol/L    Urea Nitrogen 45 (H) 6 - 23 mg/dL    Creatinine 2.15 (H) 0.50 - 1.05 mg/dL    eGFR  25 (L) >60 mL/min/1.73m*2    Calcium 9.0 8.6 - 10.3 mg/dL   SST TOP   Result Value Ref Range    Extra Tube Hold for add-ons.    Renal function panel   Result Value Ref Range    Glucose 134 (H) 74 - 99 mg/dL    Sodium 143 136 - 145 mmol/L    Potassium 4.8 3.5 - 5.3 mmol/L    Chloride 106 98 - 107 mmol/L    Bicarbonate 28 21 - 32 mmol/L    Anion Gap 14 10 - 20 mmol/L    Urea Nitrogen 44 (H) 6 - 23 mg/dL    Creatinine 2.10 (H) 0.50 - 1.05 mg/dL    eGFR 26 (L) >60 mL/min/1.73m*2    Calcium 8.8 8.6 - 10.3 mg/dL    Phosphorus 4.7 2.5 - 4.9 mg/dL    Albumin 2.9 (L) 3.4 - 5.0 g/dL   POCT GLUCOSE   Result Value Ref Range    POCT Glucose 90 74 - 99 mg/dL   POCT GLUCOSE   Result Value Ref Range    POCT Glucose 96 74 - 99 mg/dL       XR ankle bilateral complete minimum 3 views    Result Date: 6/5/2024  Interpreted By:  Jaziel Montalvo, STUDY: XR ANKLE BILATERAL COMPLETE MINIMUM 3 VIEWS; XR TIBIA FIBULA BILATERAL 2 VIEWS; ;  6/5/2024 8:26 pm   INDICATION: Signs/Symptoms:bilateral lower extremity wounds.   COMPARISON: None.   ACCESSION NUMBER(S): [id number]; [id number]   ORDERING CLINICIAN: LUDWIN MCNEILL   FINDINGS: Osteopenic bone.   RIGHT: There is diffuse edema of the right leg and ankle. No radiopaque foreign bodies or soft tissue gas. There is a rounded centrally lucent calcification of the lateral right thigh likely representing an area of fat necrosis. Scattered phleboliths in the pretibial soft tissues are likely reflective of venous stasis edema. No definite osseous destruction or erosive change. No acute fracture or malalignment. There is severe osteoarthrosis of the right knee with bone-on-bone contact and exuberant osteophyte formation. There is prominent dorsal midfoot spurring and 5th tarsometatarsal spurring. There is internal fixation hardware with a single screw extending from the 1st metatarsal diaphysis across the Lisfranc joint to the middle cuneiform bone. There is minimal perihardware lucency at  the head of the screw.     LEFT: There is diffuse edema of the soft tissues without radiopaque foreign body or gas. There is severe left knee osteoarthrosis with bone-on-bone contact and prominent osteophytosis. There is a metallic cerclage wire extending through the patella and the tibial tuberosity. Limited evaluation of the ankle due to contracture. There is severe hindfoot valgus. There is diffuse ankylosis of the hindfoot and midfoot articulations with abandoned arthrodesis hardware tracts noted. There is advanced tibiotalar osteoarthrosis. There is a remote fracture deformity of the distal fibular and metadiaphysis. No definite osseous destruction or erosive change. No evidence of acute fracture.       1. Diffuse edema of the bilateral legs without radiopaque foreign body or gas. No radiographic evidence of acute osteomyelitis.   2. Severe bilateral knee osteoarthrosis (right > left).   3. Diffuse ankylosis of the left hindfoot and midfoot articulations, severe left tibiotalar osteoarthrosis common severe hindfoot valgus.   Multiple potential etiologies of atraumatic pain as described in detail above, depending on location and clinical context.   MACRO: None.   Signed by: Jaziel Montalvo 6/5/2024 10:15 PM Dictation workstation:   [id number]    XR tibia fibula bilateral 2 views    Result Date: 6/5/2024  Interpreted By:  Jaziel Montalvo, STUDY: XR ANKLE BILATERAL COMPLETE MINIMUM 3 VIEWS; XR TIBIA FIBULA BILATERAL 2 VIEWS; ;  6/5/2024 8:26 pm   INDICATION: Signs/Symptoms:bilateral lower extremity wounds.   COMPARISON: None.   ACCESSION NUMBER(S): [id number]; [id number]   ORDERING CLINICIAN: LUDWIN MCNEILL   FINDINGS: Osteopenic bone.   RIGHT: There is diffuse edema of the right leg and ankle. No radiopaque foreign bodies or soft tissue gas. There is a rounded centrally lucent calcification of the lateral right thigh likely representing an area of fat necrosis. Scattered phleboliths in the pretibial  soft tissues are likely reflective of venous stasis edema. No definite osseous destruction or erosive change. No acute fracture or malalignment. There is severe osteoarthrosis of the right knee with bone-on-bone contact and exuberant osteophyte formation. There is prominent dorsal midfoot spurring and 5th tarsometatarsal spurring. There is internal fixation hardware with a single screw extending from the 1st metatarsal diaphysis across the Lisfranc joint to the middle cuneiform bone. There is minimal perihardware lucency at the head of the screw.     LEFT: There is diffuse edema of the soft tissues without radiopaque foreign body or gas. There is severe left knee osteoarthrosis with bone-on-bone contact and prominent osteophytosis. There is a metallic cerclage wire extending through the patella and the tibial tuberosity. Limited evaluation of the ankle due to contracture. There is severe hindfoot valgus. There is diffuse ankylosis of the hindfoot and midfoot articulations with abandoned arthrodesis hardware tracts noted. There is advanced tibiotalar osteoarthrosis. There is a remote fracture deformity of the distal fibular and metadiaphysis. No definite osseous destruction or erosive change. No evidence of acute fracture.       1. Diffuse edema of the bilateral legs without radiopaque foreign body or gas. No radiographic evidence of acute osteomyelitis.   2. Severe bilateral knee osteoarthrosis (right > left).   3. Diffuse ankylosis of the left hindfoot and midfoot articulations, severe left tibiotalar osteoarthrosis common severe hindfoot valgus.   Multiple potential etiologies of atraumatic pain as described in detail above, depending on location and clinical context.   MACRO: None.   Signed by: Jaziel Montalvo 6/5/2024 10:15 PM Dictation workstation:   [id number]           Assessment/Plan   Principal Problem:    Cellulitis of leg without foot    Bing Holliday is a 63 y.o. female from a care facility with a  past medical history of adrenocortical insufficiency, generalized weakness, hyperparathyroidism, lupus, diabetes, COPD, protein calorie malnutrition, anxiety, Raynaud's, iron deficiency anemia, rheumatoid arthritis, chronic kidney disease, depression, pulmonary hypertension, cardiomyopathy, paroxysmal A-fib on Eliquis, gout, fibromyalgia, hypertension, hyperlipidemia, who was admitted for management of abnormal lab workup and bilateral lower extremity wounds and swelling     B/L legs swelling  Cellulitis  - Cefepime  - torsemide  - cx pending, if neg can dc abx    JED/CKD  - nephro following    Leukopenia  Normocytic anemia  - monitor    A fib  - metoprolol  - eliquis    HLD  - statin    Adrenocortical insufficiency  - flornief  - hydrocortisone    DM  - lantus  - ISS    Seizure  - keppra    GERD  - protonix    Depression/anxiety/mood disorder  - zyprexa    Dispo: monitor clinically   SW assisting with SNF upon dc          Marla Villegas MD  Hospitalist

## 2024-06-07 NOTE — PROGRESS NOTES
No response yet from [facility]. SW sent two messages to facility via [employer] inquiring about their ability to accept pt. Awaiting facility response.

## 2024-06-07 NOTE — PROGRESS NOTES
"Renal Progress Note  Assessment:  63 y.o. female with history s/f T2DM, SLE, COPD, adrenal insufficiency, Raynauds, RA, CKD stage III, depression, pHTN, pA.fib, gout, fibromyalgia, HTN and HLD who presented for abnormal labs.     JED on CKD stage III: ? If in setting of CRS as function improving w/ diuresis, CKD risk factors 2/2 T2DM, HTN, unlikely in setting of SLE, baseline Scr ~1.3-1.4 w/ eGFR low/mid 40s  Fluid overload   Hypomagnesemia   Hypernatremia: corrected   Hypokalemia: corrected      Plan:  -Will shift to torsemide 40 mg daily first dose today to be further adjusted according to the patient clinical and laboratory response  - advised to hydrate more   - replete K, Mg as needed      Subjective:   Admit Date: 6/4/2024    Interval History: Patient seen and examined uneventful night no uremic related or fluid volume overload related symptoms      Medications:   Scheduled Meds:apixaban, 2.5 mg, oral, BID  atorvastatin, 40 mg, oral, Nightly  cefepime, 1 g, intravenous, q24h  fludrocortisone, 0.1 mg, oral, Every other day  tiotropium, 2 puff, inhalation, Daily   And  fluticasone furoate-vilanteroL, 1 puff, inhalation, Daily  folic acid, 1 mg, oral, Daily  furosemide, 40 mg, intravenous, q12h  hydrocortisone, 20 mg, oral, Daily  insulin glargine, 10 Units, subcutaneous, q AM  insulin lispro, 0-10 Units, subcutaneous, TID  levETIRAcetam, 500 mg, oral, q12h  melatonin, 5 mg, oral, Nightly  metoprolol tartrate, 25 mg, oral, BID  metroNIDAZOLE, 500 mg, intravenous, Once  morphine, 2 mg, intravenous, Once  mycophenolate, 1,000 mg, oral, BID  OLANZapine, 5 mg, oral, Nightly  pantoprazole, 40 mg, oral, Daily  thiamine, 100 mg, oral, Daily      Continuous Infusions:     CBC:   Lab Results   Component Value Date    HGB 8.5 (L) 06/07/2024    HGB 9.0 (L) 06/06/2024    WBC 3.7 (L) 06/07/2024    WBC 3.7 (L) 06/06/2024    PLT 85 (L) 06/07/2024    PLT 78 (L) 06/06/2024      Anemia:  No results found for: \"FERRITIN\", \"IRON\", " "\"TIBC\"   BMP:    Lab Results   Component Value Date     06/07/2024     06/06/2024    K 4.8 06/07/2024    K 4.0 06/06/2024     06/07/2024     06/06/2024    CO2 30 06/07/2024    CO2 31 06/06/2024    BUN 45 (H) 06/07/2024    BUN 40 (H) 06/06/2024    CREATININE 2.15 (H) 06/07/2024    CREATININE 2.00 (H) 06/06/2024      Bone disease: No results found for: \"PHOS\", \"PTH\", \"VITD25\"   Urinalysis:    Lab Results   Component Value Date    KRYSTAL 6.0 06/04/2024    PROTUR 30 (1+) (N) 06/04/2024    GLUCOSEU NEGATIVE 06/04/2024    BLOODU NEGATIVE 06/04/2024    KETONESU NEGATIVE 06/04/2024    BILIRUBINU NEGATIVE 06/04/2024    NITRITEU NEGATIVE 06/04/2024    LEUKOCYTESU NEGATIVE 06/04/2024        Objective:   Vitals: /60 (BP Location: Left arm, Patient Position: Lying)   Pulse 60   Temp 35.2 °C (95.4 °F) (Temporal)   Resp 16   Ht 1.778 m (5' 10\")   Wt 95.3 kg (210 lb)   LMP  (LMP Unknown)   SpO2 100%   BMI 30.13 kg/m²    Wt Readings from Last 3 Encounters:   06/04/24 95.3 kg (210 lb)   05/13/24 102 kg (224 lb 13.9 oz)   04/24/24 90.9 kg (200 lb 6.4 oz)      24HR INTAKE/OUTPUT:    Intake/Output Summary (Last 24 hours) at 6/7/2024 1107  Last data filed at 6/7/2024 0006  Gross per 24 hour   Intake 0 ml   Output 1100 ml   Net -1100 ml     Admission weight:  Weight: 95.3 kg (210 lb)      Constitutional:  Alert, awake, no apparent distress   Skin:normal, no rash  HEENT:sclera anicteric.  Head atraumatic normocephalic  Neck:supple with no thyromegally  Cardiovascular:  S1, S2 without m/r/g   Respiratory:  CTA B without w/r/r   Abdomen: +bs, soft, nt  Ext: no LE edema  Musculoskeletal:Intact  Neuro:Alert and oriented with no deficit      Electronically signed by Sully Gutiérrez MD on 6/7/2024 at 11:07 AM            "

## 2024-06-07 NOTE — CONSULTS
"Nutrition Initial Assessment:   Nutrition Assessment         Patient is a 63 y.o. female presenting with admission dx of cellulitis to bilateral lower extremities.   Pmhx includes lupus, CKD stage 3, COPD, GERD, CAD, Type 2 diabetes.       Nutrition History:  Energy Intake: Good > 75 %  Food and Nutrient History: Nutrition consult completed in the context of lower extremity wounds.  Wounds are likely venous ulcers per physician note but may be secondary to Lupus.  Pt also with a hx of type II DM.  Last HgbA1c was improved at 6.8.  Pt states that her appetite has been good- no documentation to support intakes.  Weight fluctuates and is down 14# from last weight- likely fluid-related.   Pt is receiving a carbohydrate-controlled diet of 75g per meal and 45g with snacks.  Pt recevied Glucerna shakes last time and is interested in having these again.  Lewis not indicated at this time due to nature of the wounds being vascular.  Food Allergies/Intolerances:  None  GI Symptoms: None  Oral Problems: None       Anthropometrics:  Height: 177.8 cm (5' 10\")   Weight: 95.3 kg (210 lb)   BMI (Calculated): 30.13             Weight History:   Wt Readings from Last 10 Encounters:   06/04/24 95.3 kg (210 lb)   05/13/24 102 kg (224 lb 13.9 oz)   04/24/24 90.9 kg (200 lb 6.4 oz)   03/19/24 96.5 kg (212 lb 11.9 oz)   03/11/24 96.5 kg (212 lb 11.2 oz)   02/14/24 86.2 kg (190 lb)   02/08/24 87.5 kg (193 lb)   01/20/24 99.3 kg (218 lb 14.7 oz)   01/17/24 96 kg (211 lb 10.3 oz)   12/26/23 93 kg (205 lb)       Weight Change %:  6% weight loss over 1 mos.         Nutrition Focused Physical Exam Findings:    Subcutaneous Fat Loss:   Orbital Fat Pads: Well nourished (slightly bulging fat pads)  Buccal Fat Pads: Well nourished (full, rounded cheeks)  Triceps: Well nourished (ample fat tissue)  Ribs: Well nourished (chest is full, ribs do not show, slight to no protrusion of the iliac crest)  Muscle Wasting:  Temporalis: Mild-Moderate (slight " depression)  Pectoralis (Clavicular Region): Mild-Moderate (some protrusion of clavicle)  Deltoid/Trapezius: Defer  Interosseous: Well nourished (muscle bulges)  Trapezius/Infraspinatus/Supraspinatus (Scapular Region): Defer  Quadriceps: Defer  Gastrocnemius: Defer  Edema:  Edema: +2 mild  Edema Location: BLE    Nutrition Significant Labs:  BMP Trend:   Results from last 7 days   Lab Units 06/07/24  0542 06/06/24  0618 06/05/24  0622 06/04/24  1458   GLUCOSE mg/dL 137* 72* 158* 155*   CALCIUM mg/dL 9.0 9.1 8.7 8.6   SODIUM mmol/L 143 145 146* 146*   POTASSIUM mmol/L 4.8 4.0 3.3* 3.9   CO2 mmol/L 30 31 30 27   CHLORIDE mmol/L 106 106 109* 110*   BUN mg/dL 45* 40* 37* 41*   CREATININE mg/dL 2.15* 2.00* 2.06* 2.24*    , A1C:  Lab Results   Component Value Date    HGBA1C 6.8 (H) 06/04/2024       Nutrition Specific Medications:  Lantus, lasix,     Dietary Orders (From admission, onward)       Start     Ordered    06/04/24 1648  Adult diet Carb Controlled; 75 gram carb/meal, 45 gram Carb evening snack  Diet effective now        Question Answer Comment   Diet type Carb Controlled    Carb diet selection: 75 gram carb/meal, 45 gram Carb evening snack        06/04/24 1647                     Estimated Needs:   Total Energy Estimated Needs (kCal): 1906 kCal  Method for Estimating Needs: 20 kcal/kg ABW  Total Protein Estimated Needs (g): 76 g  Method for Estimating Needs: 61-82 g (0.6-0.8 g/kg ABW) or as renal function allows    Nutrition Diagnosis   Malnutrition Diagnosis  Patient has Malnutrition Diagnosis: Yes  Malnutrition Diagnosis: Moderate malnutrition related to chronic disease or condition  As Evidenced by: 6% weight loss; loss of muscle noted in temples            Nutrition Interventions/Recommendations         Nutrition Prescription:           Nutrition Interventions:   Interventions: Meals and snacks, Medical food supplement  Meals and Snacks: Carbohydrate-modified diet  Goal: Consumes 3 meals per day  Medical Food  Supplement: Commercial beverage  Goal: Glucerna BID (provides 220 kcal, 10 g protein per serving).      Nutrition Education:   none         Nutrition Monitoring and Evaluation   Food/Nutrient Related History Monitoring  Monitoring and Evaluation Plan: Energy intake, Amount of food, Fluid intake  Energy Intake: Estimated energy intake  Criteria: Pt meets >75% of estimated energy needs  Fluid Intake: Estimated fluid intake  Criteria: Meets >75% of estimated fluid needs  Amount of Food: Estimated amout of food, Medical food intake  Criteria: Pt consumes >75% of meals and supplements    Body Composition/Growth/Weight History  Monitoring and Evaluation Plan: Weight  Weight: Measured weight  Criteria: Maintains stable weight    Biochemical Data, Medical Tests and Procedures  Monitoring and Evaluation Plan: Glucose/endocrine profile, Electrolyte/renal panel  Electrolyte and Renal Panel: Sodium, Potassium, Phosphorus  Criteria: Electrolytes WNL  Glucose/Endocrine Profile: Glucose, casual  Criteria: BG within desirable range     Time Spent/Follow-up Reminder:       Time Spent (min): 45 minutes  Last Date of Nutrition Visit: 06/07/24  Nutrition Follow-Up Needed?: 5-7 days

## 2024-06-07 NOTE — CARE PLAN
The patient's goals for the shift include to get back to normal    The clinical goals for the shift include Pain management    Over the shift, the patient did not make progress toward the following goals. Barriers to progression include  . Recommendations to address these barriers include  .

## 2024-06-08 LAB
ALBUMIN SERPL BCP-MCNC: 3 G/DL (ref 3.4–5)
ALP SERPL-CCNC: 170 U/L (ref 33–136)
ALT SERPL W P-5'-P-CCNC: 17 U/L (ref 7–45)
ANION GAP SERPL CALC-SCNC: 12 MMOL/L (ref 10–20)
AST SERPL W P-5'-P-CCNC: 18 U/L (ref 9–39)
BILIRUB SERPL-MCNC: 0.3 MG/DL (ref 0–1.2)
BUN SERPL-MCNC: 47 MG/DL (ref 6–23)
CALCIUM SERPL-MCNC: 9 MG/DL (ref 8.6–10.3)
CHLORIDE SERPL-SCNC: 104 MMOL/L (ref 98–107)
CO2 SERPL-SCNC: 33 MMOL/L (ref 21–32)
CREAT SERPL-MCNC: 2.28 MG/DL (ref 0.5–1.05)
EGFRCR SERPLBLD CKD-EPI 2021: 24 ML/MIN/1.73M*2
GLUCOSE BLD MANUAL STRIP-MCNC: 122 MG/DL (ref 74–99)
GLUCOSE BLD MANUAL STRIP-MCNC: 52 MG/DL (ref 74–99)
GLUCOSE BLD MANUAL STRIP-MCNC: 82 MG/DL (ref 74–99)
GLUCOSE BLD MANUAL STRIP-MCNC: 91 MG/DL (ref 74–99)
GLUCOSE BLD MANUAL STRIP-MCNC: 93 MG/DL (ref 74–99)
GLUCOSE BLD MANUAL STRIP-MCNC: 98 MG/DL (ref 74–99)
GLUCOSE SERPL-MCNC: 63 MG/DL (ref 74–99)
HOLD SPECIMEN: NORMAL
HOLD SPECIMEN: NORMAL
POTASSIUM SERPL-SCNC: 4.3 MMOL/L (ref 3.5–5.3)
PROT SERPL-MCNC: 6.2 G/DL (ref 6.4–8.2)
SODIUM SERPL-SCNC: 145 MMOL/L (ref 136–145)

## 2024-06-08 PROCEDURE — 2500000004 HC RX 250 GENERAL PHARMACY W/ HCPCS (ALT 636 FOR OP/ED): Performed by: STUDENT IN AN ORGANIZED HEALTH CARE EDUCATION/TRAINING PROGRAM

## 2024-06-08 PROCEDURE — 2500000001 HC RX 250 WO HCPCS SELF ADMINISTERED DRUGS (ALT 637 FOR MEDICARE OP): Performed by: INTERNAL MEDICINE

## 2024-06-08 PROCEDURE — 1210000001 HC SEMI-PRIVATE ROOM DAILY

## 2024-06-08 PROCEDURE — 82947 ASSAY GLUCOSE BLOOD QUANT: CPT

## 2024-06-08 PROCEDURE — 36415 COLL VENOUS BLD VENIPUNCTURE: CPT | Performed by: STUDENT IN AN ORGANIZED HEALTH CARE EDUCATION/TRAINING PROGRAM

## 2024-06-08 PROCEDURE — 87040 BLOOD CULTURE FOR BACTERIA: CPT | Mod: ELYLAB | Performed by: STUDENT IN AN ORGANIZED HEALTH CARE EDUCATION/TRAINING PROGRAM

## 2024-06-08 PROCEDURE — 2500000005 HC RX 250 GENERAL PHARMACY W/O HCPCS: Performed by: STUDENT IN AN ORGANIZED HEALTH CARE EDUCATION/TRAINING PROGRAM

## 2024-06-08 PROCEDURE — 2500000001 HC RX 250 WO HCPCS SELF ADMINISTERED DRUGS (ALT 637 FOR MEDICARE OP): Performed by: STUDENT IN AN ORGANIZED HEALTH CARE EDUCATION/TRAINING PROGRAM

## 2024-06-08 PROCEDURE — 2500000002 HC RX 250 W HCPCS SELF ADMINISTERED DRUGS (ALT 637 FOR MEDICARE OP, ALT 636 FOR OP/ED): Mod: MUE | Performed by: STUDENT IN AN ORGANIZED HEALTH CARE EDUCATION/TRAINING PROGRAM

## 2024-06-08 PROCEDURE — 80053 COMPREHEN METABOLIC PANEL: CPT | Performed by: STUDENT IN AN ORGANIZED HEALTH CARE EDUCATION/TRAINING PROGRAM

## 2024-06-08 PROCEDURE — 99232 SBSQ HOSP IP/OBS MODERATE 35: CPT | Performed by: STUDENT IN AN ORGANIZED HEALTH CARE EDUCATION/TRAINING PROGRAM

## 2024-06-08 RX ADMIN — DEXTROSE MONOHYDRATE 12.5 G: 25 INJECTION, SOLUTION INTRAVENOUS at 09:13

## 2024-06-08 RX ADMIN — MYCOPHENOLATE MOFETIL 1000 MG: 250 CAPSULE ORAL at 09:33

## 2024-06-08 RX ADMIN — HYDROCORTISONE SODIUM SUCCINATE 100 MG: 100 INJECTION, POWDER, FOR SOLUTION INTRAMUSCULAR; INTRAVENOUS at 22:33

## 2024-06-08 RX ADMIN — APIXABAN 2.5 MG: 5 TABLET, FILM COATED ORAL at 09:29

## 2024-06-08 RX ADMIN — Medication 100 MG: at 09:28

## 2024-06-08 RX ADMIN — CEFEPIME 1 G: 1 INJECTION, POWDER, FOR SOLUTION INTRAMUSCULAR; INTRAVENOUS at 09:28

## 2024-06-08 RX ADMIN — HYDROCORTISONE SODIUM SUCCINATE 100 MG: 100 INJECTION, POWDER, FOR SOLUTION INTRAMUSCULAR; INTRAVENOUS at 14:06

## 2024-06-08 RX ADMIN — FOLIC ACID 1 MG: 1 TABLET ORAL at 09:30

## 2024-06-08 RX ADMIN — TORSEMIDE 40 MG: 20 TABLET ORAL at 09:28

## 2024-06-08 RX ADMIN — HYDROCORTISONE 20 MG: 5 TABLET ORAL at 09:32

## 2024-06-08 RX ADMIN — PANTOPRAZOLE SODIUM 40 MG: 40 TABLET, DELAYED RELEASE ORAL at 05:16

## 2024-06-08 RX ADMIN — FLUTICASONE FUROATE AND VILANTEROL TRIFENATATE 1 PUFF: 200; 25 POWDER RESPIRATORY (INHALATION) at 09:34

## 2024-06-08 RX ADMIN — LEVETIRACETAM 500 MG: 500 TABLET, FILM COATED ORAL at 05:16

## 2024-06-08 RX ADMIN — HYDROCODONE BITARTRATE AND ACETAMINOPHEN 1 TABLET: 5; 325 TABLET ORAL at 05:35

## 2024-06-08 RX ADMIN — TIOTROPIUM BROMIDE INHALATION SPRAY 2 PUFF: 3.12 SPRAY, METERED RESPIRATORY (INHALATION) at 09:34

## 2024-06-08 ASSESSMENT — COGNITIVE AND FUNCTIONAL STATUS - GENERAL
STANDING UP FROM CHAIR USING ARMS: A LOT
HELP NEEDED FOR BATHING: A LOT
MOVING FROM LYING ON BACK TO SITTING ON SIDE OF FLAT BED WITH BEDRAILS: A LITTLE
DAILY ACTIVITIY SCORE: 14
EATING MEALS: A LITTLE
PERSONAL GROOMING: A LOT
MOVING TO AND FROM BED TO CHAIR: A LOT
TURNING FROM BACK TO SIDE WHILE IN FLAT BAD: A LOT
MOBILITY SCORE: 13
DRESSING REGULAR LOWER BODY CLOTHING: A LITTLE
TURNING FROM BACK TO SIDE WHILE IN FLAT BAD: A LOT
MOBILITY SCORE: 13
WALKING IN HOSPITAL ROOM: A LOT
CLIMB 3 TO 5 STEPS WITH RAILING: A LOT
MOVING TO AND FROM BED TO CHAIR: A LOT
WALKING IN HOSPITAL ROOM: A LOT
CLIMB 3 TO 5 STEPS WITH RAILING: A LOT
MOVING FROM LYING ON BACK TO SITTING ON SIDE OF FLAT BED WITH BEDRAILS: A LITTLE
TOILETING: A LOT
DRESSING REGULAR UPPER BODY CLOTHING: A LOT
STANDING UP FROM CHAIR USING ARMS: A LOT

## 2024-06-08 ASSESSMENT — PAIN SCALES - GENERAL
PAINLEVEL_OUTOF10: 7
PAINLEVEL_OUTOF10: 0 - NO PAIN

## 2024-06-08 ASSESSMENT — PAIN - FUNCTIONAL ASSESSMENT
PAIN_FUNCTIONAL_ASSESSMENT: 0-10
PAIN_FUNCTIONAL_ASSESSMENT: 0-10

## 2024-06-08 NOTE — PROGRESS NOTES
"Renal Progress Note  Assessment:  63 y.o. female with history s/f T2DM, SLE, COPD, adrenal insufficiency, Raynauds, RA, CKD stage III, depression, pHTN, pA.fib, gout, fibromyalgia, HTN and HLD who presented for abnormal labs.     JED on CKD stage III: ? If in setting of CRS as function improving w/ diuresis, CKD risk factors 2/2 T2DM, HTN, unlikely in setting of SLE, baseline Scr ~1.3-1.4 w/ eGFR low/mid 40s  Fluid overload: improved   Hypomagnesemia   Hypernatremia: corrected   Hypokalemia: corrected      Plan:  - continue torsemide 40 mg PO daily      Subjective:   Admit Date: 6/4/2024    Interval History: Scr slightly up, no acute overnight events       Medications:   Scheduled Meds:apixaban, 2.5 mg, oral, BID  atorvastatin, 40 mg, oral, Nightly  cefepime, 1 g, intravenous, q24h  fludrocortisone, 0.1 mg, oral, Every other day  tiotropium, 2 puff, inhalation, Daily   And  fluticasone furoate-vilanteroL, 1 puff, inhalation, Daily  folic acid, 1 mg, oral, Daily  hydrocortisone sodium succinate, 100 mg, intravenous, q8h  insulin glargine, 10 Units, subcutaneous, q AM  insulin lispro, 0-10 Units, subcutaneous, TID  levETIRAcetam, 500 mg, oral, q12h  melatonin, 5 mg, oral, Nightly  metoprolol tartrate, 25 mg, oral, BID  mycophenolate, 1,000 mg, oral, BID  OLANZapine, 5 mg, oral, Nightly  pantoprazole, 40 mg, oral, Daily  thiamine, 100 mg, oral, Daily  torsemide, 40 mg, oral, Daily      Continuous Infusions:     CBC:   Lab Results   Component Value Date    HGB 8.5 (L) 06/07/2024    HGB 8.5 (L) 06/07/2024    WBC 5.1 06/07/2024    WBC 3.7 (L) 06/07/2024    PLT 93 (L) 06/07/2024    PLT 85 (L) 06/07/2024      Anemia:  No results found for: \"FERRITIN\", \"IRON\", \"TIBC\"   BMP:    Lab Results   Component Value Date     06/08/2024     06/07/2024     06/07/2024    K 4.3 06/08/2024    K 4.8 06/07/2024    K 4.8 06/07/2024     06/08/2024     06/07/2024     06/07/2024    CO2 33 (H) 06/08/2024    CO2 " "30 06/07/2024    CO2 28 06/07/2024    BUN 47 (H) 06/08/2024    BUN 45 (H) 06/07/2024    BUN 44 (H) 06/07/2024    CREATININE 2.28 (H) 06/08/2024    CREATININE 2.15 (H) 06/07/2024    CREATININE 2.10 (H) 06/07/2024      Bone disease:   Lab Results   Component Value Date    PHOS 4.7 06/07/2024      Urinalysis:    No results found for: \"KRYSTAL\", \"PROTUR\", \"GLUCOSEU\", \"BLOODU\", \"KETONESU\", \"BILIRUBINU\", \"NITRITEU\", \"LEUKOCYTESU\", \"UTPCR\"       Objective:   Vitals: /72 (BP Location: Right arm, Patient Position: Lying)   Pulse 60   Temp 34.8 °C (94.6 °F) (Temporal)   Resp 16   Ht 1.778 m (5' 10\")   Wt 95.3 kg (210 lb)   LMP  (LMP Unknown)   SpO2 100%   BMI 30.13 kg/m²    Wt Readings from Last 3 Encounters:   06/04/24 95.3 kg (210 lb)   05/13/24 102 kg (224 lb 13.9 oz)   04/24/24 90.9 kg (200 lb 6.4 oz)      24HR INTAKE/OUTPUT:    Intake/Output Summary (Last 24 hours) at 6/8/2024 1337  Last data filed at 6/8/2024 0536  Gross per 24 hour   Intake --   Output 1550 ml   Net -1550 ml     Admission weight:  Weight: 95.3 kg (210 lb)      General: alert, in no apparent distress  HEENT: normocephalic, atraumatic, anicteric  Lungs: non-labored respirations, clear to auscultation bilaterally  Heart: regular rate and rhythm, no murmurs or rubs  Abdomen: soft, non-tender, non-distended  Ext: no cyanosis, ++ BLE peripheral edema  Neuro: alert and oriented, no gross abnormalities  Skin: multiple LE open skin blisters (wrapped)          Electronically signed by Federico Chowdhury MD on 6/8/2024 at 1:37 PM            "

## 2024-06-08 NOTE — CARE PLAN
The patient's goals for the shift include to get back to normal    The clinical goals for the shift include safety    Over the shift, the patient did not make progress toward the following goals.   Problem: Safety  Goal: Patient will be injury free during hospitalization  Outcome: Progressing  Goal: I will remain free of falls  Outcome: Progressing  Flowsheets (Taken 6/7/2024 2127)  Resident will remain free of falls:   Apply bed/chair alarms as appropriate   Assist with toileting as orderd   Visual checks per facility policy   Maintain bed at position as ordered (chair height, low bed)

## 2024-06-08 NOTE — CARE PLAN
The patient's goals for the shift include to get back to normal    The clinical goals for the shift include safety      Problem: Pain - Adult  Goal: Verbalizes/displays adequate comfort level or baseline comfort level  Outcome: Met     Problem: Safety - Adult  Goal: Free from fall injury  Outcome: Met     Problem: Discharge Planning  Goal: Discharge to home or other facility with appropriate resources  Outcome: Progressing     Problem: Chronic Conditions and Co-morbidities  Goal: Patient's chronic conditions and co-morbidity symptoms are monitored and maintained or improved  Outcome: Progressing     Problem: Pain  Goal: Takes deep breaths with improved pain control throughout the shift  Outcome: Met  Goal: Turns in bed with improved pain control throughout the shift  Outcome: Progressing  Goal: Participates in PT with improved pain control throughout the shift  Outcome: Progressing     Problem: Pain  Goal: My pain/discomfort is manageable  Outcome: Met     Problem: Safety  Goal: Patient will be injury free during hospitalization  Outcome: Met  Goal: I will remain free of falls  Outcome: Met     Problem: Daily Care  Goal: Daily care needs are met  Outcome: Met     Problem: Psychosocial Needs  Goal: Demonstrates ability to cope with hospitalization/illness  Outcome: Progressing     Problem: Skin  Goal: Decreased wound size/increased tissue granulation at next dressing change  Outcome: Progressing  Goal: Participates in plan/prevention/treatment measures  Outcome: Progressing  Goal: Prevent/manage excess moisture  Outcome: Met  Goal: Prevent/minimize sheer/friction injuries  Outcome: Met  Goal: Promote/optimize nutrition  Outcome: Progressing  Goal: Promote skin healing  Outcome: Progressing     Problem: Diabetes  Goal: Achieve decreasing blood glucose levels by end of shift  Outcome: Met  Goal: Increase stability of blood glucose readings by end of shift  Outcome: Met

## 2024-06-08 NOTE — PROGRESS NOTES
Bing Holliday is a 63 y.o. female on day 4 of admission presenting with Cellulitis of leg without foot.      Subjective   Patient was admitted on 6 4 sent from nursing of facility due to abnormal blood work.  The patient was complaining of bilateral lower extremity swelling and wound as well.  She was admitted and has been treated for leg cellulitis.    This morning she is hypothermic, cold, confused and complaining of abdominal pain as well.    Current medications reviewed.  She is ordered Eliquis, Lipitor, cefepime, Florinef, folic acid, hydrocortisone, insulin Lantus, sliding scale, Keppra, metoprolol, Zyprexa, Protonix, thiamine, torsemide           Objective     Last Recorded Vitals  /72 (BP Location: Right arm, Patient Position: Lying)   Pulse 60   Temp 34.8 °C (94.6 °F) (Temporal)   Resp 16   Wt 95.3 kg (210 lb)   SpO2 100%   Intake/Output last 3 Shifts:    Intake/Output Summary (Last 24 hours) at 6/8/2024 1255  Last data filed at 6/8/2024 0536  Gross per 24 hour   Intake --   Output 1550 ml   Net -1550 ml       Admission Weight  Weight: 95.3 kg (210 lb) (06/04/24 1313)    Daily Weight  06/04/24 : 95.3 kg (210 lb)    Image Results  XR ankle bilateral complete minimum 3 views, XR tibia fibula bilateral 2 views  Narrative: Interpreted By:  Jaziel Montalvo,   STUDY:  XR ANKLE BILATERAL COMPLETE MINIMUM 3 VIEWS; XR TIBIA FIBULA  BILATERAL 2 VIEWS; ;  6/5/2024 8:26 pm      INDICATION:  Signs/Symptoms:bilateral lower extremity wounds.      COMPARISON:  None.      ACCESSION NUMBER(S):  [id number]; [id number]      ORDERING CLINICIAN:  LUDWIN MCNEILL      FINDINGS:  Osteopenic bone.      RIGHT:  There is diffuse edema of the right leg and ankle. No radiopaque  foreign bodies or soft tissue gas. There is a rounded centrally  lucent calcification of the lateral right thigh likely representing  an area of fat necrosis. Scattered phleboliths in the pretibial soft  tissues are likely reflective of  venous stasis edema. No definite  osseous destruction or erosive change. No acute fracture or  malalignment. There is severe osteoarthrosis of the right knee with  bone-on-bone contact and exuberant osteophyte formation. There is  prominent dorsal midfoot spurring and 5th tarsometatarsal spurring.  There is internal fixation hardware with a single screw extending  from the 1st metatarsal diaphysis across the Lisfranc joint to the  middle cuneiform bone. There is minimal perihardware lucency at the  head of the screw.          LEFT:  There is diffuse edema of the soft tissues without radiopaque foreign  body or gas. There is severe left knee osteoarthrosis with  bone-on-bone contact and prominent osteophytosis. There is a metallic  cerclage wire extending through the patella and the tibial  tuberosity. Limited evaluation of the ankle due to contracture. There  is severe hindfoot valgus. There is diffuse ankylosis of the hindfoot  and midfoot articulations with abandoned arthrodesis hardware tracts  noted. There is advanced tibiotalar osteoarthrosis. There is a remote  fracture deformity of the distal fibular and metadiaphysis. No  definite osseous destruction or erosive change. No evidence of acute  fracture.      Impression: 1. Diffuse edema of the bilateral legs without radiopaque foreign  body or gas. No radiographic evidence of acute osteomyelitis.      2. Severe bilateral knee osteoarthrosis (right > left).      3. Diffuse ankylosis of the left hindfoot and midfoot articulations,  severe left tibiotalar osteoarthrosis common severe hindfoot valgus.      Multiple potential etiologies of atraumatic pain as described in  detail above, depending on location and clinical context.      MACRO:  None.      Signed by: Jaziel Montalvo 6/5/2024 10:15 PM  Dictation workstation:   [id number]      Physical Exam  Constitutional:       Appearance: She is ill-appearing.   HENT:      Head: Normocephalic and atraumatic.       Nose: Nose normal.   Eyes:      Extraocular Movements: Extraocular movements intact.      Pupils: Pupils are equal, round, and reactive to light.   Cardiovascular:      Rate and Rhythm: Normal rate. Rhythm irregular.      Heart sounds: Normal heart sounds.   Pulmonary:      Effort: Pulmonary effort is normal.      Breath sounds: Normal breath sounds.   Abdominal:      General: Bowel sounds are normal.      Palpations: Abdomen is soft.      Tenderness: There is abdominal tenderness.   Musculoskeletal:      Cervical back: Neck supple.   Skin:     General: Skin is warm and dry.   Neurological:      General: No focal deficit present.      Mental Status: She is alert. Mental status is at baseline.   Psychiatric:         Mood and Affect: Mood normal.         Relevant Results               Assessment/Plan                  Principal Problem:    Cellulitis of leg without foot    leg cellulitis.  Currently she is on cefepime.  She is also hypothermic.  I will consult infectious disease.    Adrenal insufficiency.  She is on Florinef and hydrocortisone.  Will switch hydrocortisone to IV.  Consult endocrinology for further help as well.    Abdominal pain.  CT abdomen and pelvis.  Also CT chest.  Recheck blood cultures today.  If diarrhea, rule out C. difficile.    Hypernatremia on admission.  Sodium is improving to 145.    Renal failure, acute on chronic, she is on Lasix.  She takes torsemide.  She is on torsemide 40 mg daily.  Nephrology is on board.  She takes Lasix at home.    Immunocompromise.  Continue cefepime.    History of paroxysmal A-fib.  She is on Eliquis.                Neno Rodarte MD

## 2024-06-09 ENCOUNTER — APPOINTMENT (OUTPATIENT)
Dept: RADIOLOGY | Facility: HOSPITAL | Age: 64
DRG: 602 | End: 2024-06-09
Payer: MEDICARE

## 2024-06-09 ENCOUNTER — OUTSIDE SERVICES (OUTPATIENT)
Dept: INFECTIOUS DISEASES | Age: 64
End: 2024-06-09
Payer: MEDICARE

## 2024-06-09 DIAGNOSIS — R45.1 AGITATION: Primary | ICD-10-CM

## 2024-06-09 DIAGNOSIS — S80.829A: ICD-10-CM

## 2024-06-09 DIAGNOSIS — L08.9: ICD-10-CM

## 2024-06-09 DIAGNOSIS — L03.115 BILATERAL LOWER LEG CELLULITIS: ICD-10-CM

## 2024-06-09 DIAGNOSIS — L03.116 BILATERAL LOWER LEG CELLULITIS: ICD-10-CM

## 2024-06-09 LAB
ALBUMIN SERPL BCP-MCNC: 3.3 G/DL (ref 3.4–5)
ALP SERPL-CCNC: 175 U/L (ref 33–136)
ALT SERPL W P-5'-P-CCNC: 13 U/L (ref 7–45)
ANION GAP SERPL CALC-SCNC: 18 MMOL/L (ref 10–20)
AST SERPL W P-5'-P-CCNC: 20 U/L (ref 9–39)
BACTERIA BLD CULT: NORMAL
BACTERIA BLD CULT: NORMAL
BILIRUB SERPL-MCNC: 0.3 MG/DL (ref 0–1.2)
BUN SERPL-MCNC: 57 MG/DL (ref 6–23)
CALCIUM SERPL-MCNC: 8.6 MG/DL (ref 8.6–10.3)
CHLORIDE SERPL-SCNC: 104 MMOL/L (ref 98–107)
CO2 SERPL-SCNC: 27 MMOL/L (ref 21–32)
CREAT SERPL-MCNC: 2.16 MG/DL (ref 0.5–1.05)
EGFRCR SERPLBLD CKD-EPI 2021: 25 ML/MIN/1.73M*2
ERYTHROCYTE [DISTWIDTH] IN BLOOD BY AUTOMATED COUNT: 19.4 % (ref 11.5–14.5)
GLUCOSE BLD MANUAL STRIP-MCNC: 108 MG/DL (ref 74–99)
GLUCOSE BLD MANUAL STRIP-MCNC: 120 MG/DL (ref 74–99)
GLUCOSE BLD MANUAL STRIP-MCNC: 132 MG/DL (ref 74–99)
GLUCOSE BLD MANUAL STRIP-MCNC: 79 MG/DL (ref 74–99)
GLUCOSE SERPL-MCNC: 111 MG/DL (ref 74–99)
HCT VFR BLD AUTO: 26.7 % (ref 36–46)
HGB BLD-MCNC: 8.2 G/DL (ref 12–16)
MCH RBC QN AUTO: 30.1 PG (ref 26–34)
MCHC RBC AUTO-ENTMCNC: 30.7 G/DL (ref 32–36)
MCV RBC AUTO: 98 FL (ref 80–100)
NRBC BLD-RTO: 0.4 /100 WBCS (ref 0–0)
PLATELET # BLD AUTO: 95 X10*3/UL (ref 150–450)
POTASSIUM SERPL-SCNC: 4.4 MMOL/L (ref 3.5–5.3)
PROT SERPL-MCNC: 6.5 G/DL (ref 6.4–8.2)
RBC # BLD AUTO: 2.72 X10*6/UL (ref 4–5.2)
SODIUM SERPL-SCNC: 145 MMOL/L (ref 136–145)
WBC # BLD AUTO: 5.2 X10*3/UL (ref 4.4–11.3)

## 2024-06-09 PROCEDURE — 99222 1ST HOSP IP/OBS MODERATE 55: CPT | Performed by: INTERNAL MEDICINE

## 2024-06-09 PROCEDURE — 99233 SBSQ HOSP IP/OBS HIGH 50: CPT | Performed by: STUDENT IN AN ORGANIZED HEALTH CARE EDUCATION/TRAINING PROGRAM

## 2024-06-09 PROCEDURE — 2500000004 HC RX 250 GENERAL PHARMACY W/ HCPCS (ALT 636 FOR OP/ED): Performed by: STUDENT IN AN ORGANIZED HEALTH CARE EDUCATION/TRAINING PROGRAM

## 2024-06-09 PROCEDURE — 70450 CT HEAD/BRAIN W/O DYE: CPT

## 2024-06-09 PROCEDURE — 82947 ASSAY GLUCOSE BLOOD QUANT: CPT | Mod: 91

## 2024-06-09 PROCEDURE — 1210000001 HC SEMI-PRIVATE ROOM DAILY

## 2024-06-09 PROCEDURE — 85027 COMPLETE CBC AUTOMATED: CPT | Performed by: STUDENT IN AN ORGANIZED HEALTH CARE EDUCATION/TRAINING PROGRAM

## 2024-06-09 PROCEDURE — 74176 CT ABD & PELVIS W/O CONTRAST: CPT

## 2024-06-09 PROCEDURE — 2500000004 HC RX 250 GENERAL PHARMACY W/ HCPCS (ALT 636 FOR OP/ED): Performed by: INTERNAL MEDICINE

## 2024-06-09 PROCEDURE — 80053 COMPREHEN METABOLIC PANEL: CPT | Performed by: STUDENT IN AN ORGANIZED HEALTH CARE EDUCATION/TRAINING PROGRAM

## 2024-06-09 PROCEDURE — 36415 COLL VENOUS BLD VENIPUNCTURE: CPT | Performed by: STUDENT IN AN ORGANIZED HEALTH CARE EDUCATION/TRAINING PROGRAM

## 2024-06-09 PROCEDURE — 70450 CT HEAD/BRAIN W/O DYE: CPT | Performed by: STUDENT IN AN ORGANIZED HEALTH CARE EDUCATION/TRAINING PROGRAM

## 2024-06-09 PROCEDURE — 71250 CT THORAX DX C-: CPT | Performed by: RADIOLOGY

## 2024-06-09 PROCEDURE — 74176 CT ABD & PELVIS W/O CONTRAST: CPT | Performed by: RADIOLOGY

## 2024-06-09 RX ORDER — MORPHINE SULFATE 2 MG/ML
1 INJECTION, SOLUTION INTRAMUSCULAR; INTRAVENOUS EVERY 4 HOURS PRN
Status: DISCONTINUED | OUTPATIENT
Start: 2024-06-09 | End: 2024-06-17 | Stop reason: HOSPADM

## 2024-06-09 RX ORDER — CEFTRIAXONE 1 G/50ML
1 INJECTION, SOLUTION INTRAVENOUS EVERY 24 HOURS
Status: DISCONTINUED | OUTPATIENT
Start: 2024-06-09 | End: 2024-06-11

## 2024-06-09 RX ORDER — LEVETIRACETAM 5 MG/ML
500 INJECTION INTRAVASCULAR ONCE
Status: COMPLETED | OUTPATIENT
Start: 2024-06-09 | End: 2024-06-09

## 2024-06-09 RX ORDER — LEVETIRACETAM 5 MG/ML
500 INJECTION INTRAVASCULAR EVERY 12 HOURS
Status: DISCONTINUED | OUTPATIENT
Start: 2024-06-09 | End: 2024-06-15

## 2024-06-09 RX ORDER — SODIUM CHLORIDE 9 MG/ML
50 INJECTION, SOLUTION INTRAVENOUS CONTINUOUS
Status: DISCONTINUED | OUTPATIENT
Start: 2024-06-09 | End: 2024-06-11

## 2024-06-09 RX ADMIN — HYDROCORTISONE SODIUM SUCCINATE 100 MG: 100 INJECTION, POWDER, FOR SOLUTION INTRAMUSCULAR; INTRAVENOUS at 13:02

## 2024-06-09 RX ADMIN — HYDROCORTISONE SODIUM SUCCINATE 100 MG: 100 INJECTION, POWDER, FOR SOLUTION INTRAMUSCULAR; INTRAVENOUS at 23:13

## 2024-06-09 RX ADMIN — CEFTRIAXONE SODIUM 1 G: 1 INJECTION, SOLUTION INTRAVENOUS at 20:20

## 2024-06-09 RX ADMIN — SODIUM CHLORIDE 50 ML/HR: 9 INJECTION, SOLUTION INTRAVENOUS at 18:48

## 2024-06-09 RX ADMIN — HYDROCORTISONE SODIUM SUCCINATE 100 MG: 100 INJECTION, POWDER, FOR SOLUTION INTRAMUSCULAR; INTRAVENOUS at 06:18

## 2024-06-09 RX ADMIN — CEFEPIME 1 G: 1 INJECTION, POWDER, FOR SOLUTION INTRAMUSCULAR; INTRAVENOUS at 08:19

## 2024-06-09 RX ADMIN — LEVETIRACETAM 500 MG: 5 INJECTION INTRAVENOUS at 21:15

## 2024-06-09 RX ADMIN — LEVETIRACETAM 500 MG: 5 INJECTION INTRAVENOUS at 07:36

## 2024-06-09 ASSESSMENT — PAIN - FUNCTIONAL ASSESSMENT
PAIN_FUNCTIONAL_ASSESSMENT: WONG-BAKER FACES

## 2024-06-09 ASSESSMENT — COGNITIVE AND FUNCTIONAL STATUS - GENERAL
MOBILITY SCORE: 8
MOVING TO AND FROM BED TO CHAIR: TOTAL
DRESSING REGULAR UPPER BODY CLOTHING: A LOT
MOBILITY SCORE: 8
STANDING UP FROM CHAIR USING ARMS: TOTAL
WALKING IN HOSPITAL ROOM: TOTAL
MOVING TO AND FROM BED TO CHAIR: TOTAL
TURNING FROM BACK TO SIDE WHILE IN FLAT BAD: A LOT
DAILY ACTIVITIY SCORE: 9
TOILETING: TOTAL
PERSONAL GROOMING: A LOT
HELP NEEDED FOR BATHING: TOTAL
MOVING FROM LYING ON BACK TO SITTING ON SIDE OF FLAT BED WITH BEDRAILS: A LOT
WALKING IN HOSPITAL ROOM: TOTAL
MOVING FROM LYING ON BACK TO SITTING ON SIDE OF FLAT BED WITH BEDRAILS: A LOT
TURNING FROM BACK TO SIDE WHILE IN FLAT BAD: A LOT
STANDING UP FROM CHAIR USING ARMS: TOTAL
DRESSING REGULAR LOWER BODY CLOTHING: TOTAL
EATING MEALS: A LOT
CLIMB 3 TO 5 STEPS WITH RAILING: TOTAL
CLIMB 3 TO 5 STEPS WITH RAILING: TOTAL

## 2024-06-09 ASSESSMENT — PAIN DESCRIPTION - DESCRIPTORS: DESCRIPTORS: PATIENT UNABLE TO DESCRIBE

## 2024-06-09 ASSESSMENT — PAIN SCALES - WONG BAKER
WONGBAKER_NUMERICALRESPONSE: HURTS LITTLE MORE
WONGBAKER_NUMERICALRESPONSE: HURTS LITTLE BIT
WONGBAKER_NUMERICALRESPONSE: HURTS WHOLE LOT

## 2024-06-09 NOTE — CARE PLAN
The patient's goals for the shift include to get back to normal    The clinical goals for the shift include safety    Over the shift, the patient did not make progress toward the following goals. Barriers to progression include . Recommendations to address these barriers include .

## 2024-06-09 NOTE — PROGRESS NOTES
"Renal Progress Note  Assessment:  63 y.o. female with history s/f T2DM, SLE, COPD, adrenal insufficiency, Raynauds, RA, CKD stage III, depression, pHTN, pA.fib, gout, fibromyalgia, HTN and HLD who presented for abnormal labs.     JED on CKD stage III: ? If in setting of CRS as function improving w/ diuresis, CKD risk factors 2/2 T2DM, HTN, unlikely in setting of SLE, baseline Scr ~1.3-1.4 w/ eGFR low/mid 40s  Fluid overload: improved   Hypomagnesemia   Hypernatremia: corrected   Hypokalemia: corrected      Plan:  - continue torsemide 40 mg PO daily      Subjective:   Admit Date: 6/4/2024    Interval History: Scr trending down, no acute overnight events       Medications:   Scheduled Meds:apixaban, 2.5 mg, oral, BID  atorvastatin, 40 mg, oral, Nightly  cefepime, 1 g, intravenous, q24h  fludrocortisone, 0.1 mg, oral, Every other day  tiotropium, 2 puff, inhalation, Daily   And  fluticasone furoate-vilanteroL, 1 puff, inhalation, Daily  folic acid, 1 mg, oral, Daily  hydrocortisone sodium succinate, 100 mg, intravenous, q8h  insulin glargine, 10 Units, subcutaneous, q AM  insulin lispro, 0-10 Units, subcutaneous, TID  levETIRAcetam, 500 mg, oral, q12h  melatonin, 5 mg, oral, Nightly  metoprolol tartrate, 25 mg, oral, BID  mycophenolate, 1,000 mg, oral, BID  OLANZapine, 5 mg, oral, Nightly  pantoprazole, 40 mg, oral, Daily  thiamine, 100 mg, oral, Daily  torsemide, 40 mg, oral, Daily      Continuous Infusions:     CBC:   Lab Results   Component Value Date    HGB 8.2 (L) 06/09/2024    HGB 8.5 (L) 06/07/2024    WBC 5.2 06/09/2024    WBC 5.1 06/07/2024    PLT 95 (L) 06/09/2024    PLT 93 (L) 06/07/2024      Anemia:  No results found for: \"FERRITIN\", \"IRON\", \"TIBC\"   BMP:    Lab Results   Component Value Date     06/09/2024     06/08/2024    K 4.4 06/09/2024    K 4.3 06/08/2024     06/09/2024     06/08/2024    CO2 27 06/09/2024    CO2 33 (H) 06/08/2024    BUN 57 (H) 06/09/2024    BUN 47 (H) 06/08/2024 " "   CREATININE 2.16 (H) 06/09/2024    CREATININE 2.28 (H) 06/08/2024      Bone disease:   Lab Results   Component Value Date    PHOS 4.7 06/07/2024      Urinalysis:    No results found for: \"KRYSTAL\", \"PROTUR\", \"GLUCOSEU\", \"BLOODU\", \"KETONESU\", \"BILIRUBINU\", \"NITRITEU\", \"LEUKOCYTESU\", \"UTPCR\"       Objective:   Vitals: /81   Pulse 76   Temp 35.5 °C (95.9 °F) (Temporal)   Resp 16   Ht 1.778 m (5' 10\")   Wt 95.3 kg (210 lb)   LMP  (LMP Unknown)   SpO2 93%   BMI 30.13 kg/m²    Wt Readings from Last 3 Encounters:   06/04/24 95.3 kg (210 lb)   05/13/24 102 kg (224 lb 13.9 oz)   04/24/24 90.9 kg (200 lb 6.4 oz)      24HR INTAKE/OUTPUT:    Intake/Output Summary (Last 24 hours) at 6/9/2024 1255  Last data filed at 6/9/2024 0751  Gross per 24 hour   Intake 100 ml   Output 1500 ml   Net -1400 ml     Admission weight:  Weight: 95.3 kg (210 lb)      General: alert, in no apparent distress  HEENT: normocephalic, atraumatic, anicteric  Lungs: non-labored respirations, clear to auscultation bilaterally  Heart: regular rate and rhythm, no murmurs or rubs  Abdomen: soft, non-tender, non-distended  Ext: no cyanosis, ++ BLE peripheral edema  Neuro: alert and oriented, no gross abnormalities  Skin: multiple LE open skin blisters (wrapped)          Electronically signed by Federico Chowdhury MD on 6/9/2024 at 12:55 PM            "

## 2024-06-09 NOTE — CONSULTS
Consults    Assessment/Plan     ADRENAL INSUFFICIENCY - by history  On Florinef  On Hydrocortisone 100 mg iv - to continue  Taper when stable  Electrolytes normal    DIABETESTYPE 2  HbA1C 6.8  On Lantus 10 units every day and scale   Continue   Fair control    TSH normal      Latest Reference Range & Units 10/08/17 11:39 10/20/17 12:12 12/05/17 15:59 03/29/18 13:30 07/23/18 17:32 10/12/18 09:48 01/11/19 12:36 04/01/19 15:16 12/02/19 12:32 01/17/20 23:17 03/02/20 11:41 06/01/20 11:52 09/29/20 11:31 12/01/20 13:05 12/10/20 11:47 01/21/21 13:55 01/22/21 08:20 04/29/21 12:16 04/29/21 18:25 04/30/21 20:09 05/01/21 06:34 05/02/21 06:45 05/03/21 06:43 05/04/21 06:10 05/05/21 06:15 05/06/21 06:59 05/07/21 07:07 05/08/21 07:38 05/10/21 05:52 05/13/21 12:57 08/02/21 16:25 09/08/21 11:58 09/17/21 17:00 09/18/21 06:00 11/21/21 18:35 11/22/21 05:14 11/23/21 04:54 11/24/21 05:58 12/02/21 11:23 12/14/21 12:22 02/05/22 20:48 02/07/22 05:44 02/08/22 04:53 02/15/22 17:57 02/16/22 12:07 02/17/22 02:14 02/17/22 11:12 02/17/22 17:55 02/18/22 02:43 02/19/22 09:13 02/20/22 07:10 02/21/22 08:41 02/22/22 05:51 02/23/22 08:46 02/24/22 06:55 02/25/22 07:10 02/26/22 09:05 02/27/22 06:34 02/27/22 13:14 02/28/22 07:29 03/01/22 06:41 03/01/22 19:06 03/02/22 05:49 03/14/22 13:27 04/13/22 13:54 04/14/22 17:55 04/15/22 06:32 04/16/22 06:45 04/17/22 04:40 05/11/22 15:59 05/13/22 18:17 05/13/22 22:52 05/14/22 08:26 05/15/22 06:59 05/16/22 07:31 05/19/22 16:04 05/20/22 09:50 05/21/22 06:27 05/22/22 07:06 05/23/22 06:36 05/24/22 06:55 05/25/22 07:33 06/10/22 08:48 08/29/22 16:00 10/01/22 16:09 11/13/22 17:07 12/09/22 16:59 12/10/22 06:58 12/11/22 06:41 12/12/22 08:05 12/13/22 08:24 12/14/22 05:27 12/15/22 05:27 12/16/22 08:06 12/17/22 07:06 01/24/23 17:00 01/25/23 06:12 01/26/23 06:13 01/27/23 06:21 01/28/23 06:23 01/29/23 05:31 01/30/23 05:32 01/31/23 05:45 01/31/23 12:42 02/23/23 16:35 02/24/23 06:09 02/25/23 06:39 02/26/23 06:18 02/27/23 06:05  02/28/23 05:54 03/01/23 05:49 03/02/23 05:52 03/03/23 06:19 03/04/23 07:24 03/20/23 23:31 03/22/23 05:28 03/22/23 14:07 03/22/23 20:18 03/23/23 05:53 03/24/23 06:18 03/25/23 06:48 03/26/23 06:04 03/26/23 08:01 03/27/23 06:48 03/28/23 06:41 03/29/23 06:33 03/30/23 05:24 03/31/23 06:34 04/01/23 05:27 04/02/23 05:28 04/03/23 05:24 04/04/23 07:30 04/05/23 07:14 05/28/23 00:20 05/28/23 07:09 05/29/23 08:49 05/31/23 08:33 06/01/23 07:40 06/02/23 06:41 07/12/23 14:46 07/22/23 22:30 07/23/23 06:01 07/24/23 06:10 07/25/23 06:21 07/26/23 06:16 07/27/23 05:59 08/27/23 13:50 08/27/23 18:52 08/28/23 04:44 08/29/23 05:00 08/30/23 07:52 08/31/23 06:35 09/01/23 06:58 10/19/23 14:55 10/20/23 06:00 10/21/23 06:37 10/22/23 13:20 10/23/23 08:03 10/24/23 06:08 10/25/23 06:05 11/20/23 13:12 11/21/23 06:53 11/22/23 05:50 11/23/23 07:50 11/24/23 06:07 11/25/23 12:06 11/26/23 08:16 11/27/23 06:49 12/17/23 00:54 12/17/23 09:31 12/18/23 06:04 12/19/23 07:48 12/20/23 06:02 12/21/23 07:42 01/14/24 14:28 01/15/24 08:34 01/16/24 05:55 01/16/24 12:04 01/16/24 14:39 01/16/24 19:50 01/17/24 00:22 01/17/24 09:15 01/17/24 18:15 01/18/24 03:38 01/18/24 14:31 01/18/24 22:31 01/19/24 11:05 01/19/24 18:42 01/20/24 05:55 01/20/24 18:36 01/21/24 04:40 01/22/24 04:45 01/23/24 04:48 01/24/24 08:34 01/26/24 06:37 01/27/24 06:22 02/14/24 12:05 03/09/24 07:39 03/10/24 03:51 03/11/24 06:22 03/12/24 06:18 03/13/24 06:52 03/14/24 06:18 03/19/24 23:28 03/20/24 05:04 03/21/24 06:44 03/21/24 14:55 03/22/24 06:34 03/23/24 07:40 03/24/24 06:31 03/24/24 18:31 03/25/24 06:10 03/26/24 06:50 04/24/24 10:06 04/24/24 21:39 04/25/24 05:23 04/25/24 12:56 04/26/24 06:19 04/27/24 11:22 04/28/24 06:53 04/29/24 06:06 04/30/24 06:02 05/07/24 14:33 05/08/24 04:57 05/08/24 20:48 05/09/24 03:59 05/10/24 04:50 05/11/24 04:45 05/12/24 02:12 05/13/24 03:40 05/14/24 03:39 05/14/24 15:49 05/15/24 03:08 05/15/24 14:03 05/16/24 05:04 05/17/24 08:27 05/18/24 05:53 06/04/24 14:58 06/05/24 06:22  06/06/24 06:18 06/07/24 05:42 06/08/24 07:30   GLUCOSE 74 - 99 mg/dL 83 90 134 (H) 116 (H) 118 (H) 77 57 (L) 88 69 (L) 94 61 (L) 95 69 (L) 68 (L) 77 76 67 (L) 99 127 (H) 104 (H) 75 77 69 (L) 69 (L) 71 (L) 140 (H) 137 (H) 118 (H) 143 (H) 75 74 91 68 (L) 74 216 (H) 62 (L) 57 (L) 60 (L) 59 (L) 63 (L) 127 (H) 52 (LL) 57 (L) 92 119 (H) 88 66 (L) 131 (H) 68 (L) 63 (L) 50 (LL) 94 82 118 (H) 74 61 (L) 116 (H) 99 225 (H) 202 (H) 145 (H) 243 (H) 99 80 60 (L) 147 (H) 68 (L) 88 77 164 (H) 244 (H) 128 (H) 111 (H) 99 100 (H) 89 71 (L) 110 (H) 62 (L) 74 62 (L) 111 (H) 99 102 (H) 75 63 (L) 78 110 (H) 63 (L) 69 (L) 65 (L) 63 (L) 103 (H) 115 (H) 71 (L) 75 53 (LL) 120 (H) 71 (L) 85 96 128 (H) 140 (H) 163 (H) 58 (L) 66 (L) 126 (H) 83 58 (L) 48 (LL) 65 (L) 94 224 (H) 93 55 (LL) 74 208 (H) 208 (H) 127 (H) 122 (H) 116 (H) 80 63 (L) 99 121 (H) 100 (H) 97 86 161 (H) 101 (H) 145 (H) 92 CANCELED 99 50 (LL) 55 (LL) 64 (L) 98 51 (LL) 69 (L) 74 64 (L) 74 105 (H) 83 96 78 140 (H) 108 (H) 129 (H) 96 65 (L) 92 103 (H) 61 (L) 40 (LL) 78 48 (LL) 82 75 71 (L) 81 131 (H) 97 74 66 (L) 67 (L) 98 138 (H) 111 (H) 114 (H) 125 (H) 71 (L) 88 66 (L) 135 (H) 92 146 (H) 223 (H) 268 (H) 255 (H) 141 (H) 114 (H) 93 662 (HH) 111 (H) 147 (H) 138 (H) 236 (H) 144 (H) 247 (H) 224 (H) 334 (H) 272 (H) 231 (H) 200 (H) 149 (H) 120 (H) 91 198 (H) 87 176 (H) 109 (H) 185 (H) 201 (H) 115 (H) 201 (H) 177 (H) 90 90 330 (H) 114 (H) 231 (H) 52 (LL) 79 112 (H) 105 (H) 76 133 (H) 99 94 109 (H) 235 (H) 171 (H) 177 (H) 241 (H) 165 (H) 129 (H) 168 (H) 161 (H) 261 (H) 208 (H) 336 (H) 121 (H) 259 (H) 213 (H) 111 (H) 155 (H) 158 (H) 72 (L) 137 (H)  134 (H) 63 (L)   (LL): Data is critically low  (HH): Data is critically high  (H): Data is abnormally high  (L): Data is abnormally low  Reason For Consult  Adrenal insufficiency, Diabetes, Hyperparathyroidism    History Of Present Illness  Bing Holliday is a 63 y.o. female with  has a past medical history of Abnormal kidney function  (04/04/2023), Acute headache (03/10/2024), Acute low back pain (10/30/2023), Acute upper respiratory infection, unspecified (03/04/2020), Acute upper respiratory infection, unspecified (09/30/2015), Arthritis, Atypical facial pain (03/10/2024), Body mass index (BMI) 23.0-23.9, adult (10/15/2021), Body mass index (BMI) 33.0-33.9, adult (03/04/2020), Cardiomegaly (08/27/2013), Chest pain (06/10/2022), Chronic kidney disease, stage 3 unspecified (Multi) (07/02/2013), Confusional state (03/10/2024), Contact with and (suspected) exposure to covid-19 (04/04/2023), Delirium (03/10/2024), Disease of pericardium, unspecified ([facility]) (07/02/2013), Encounter for follow-up examination after completed treatment for conditions other than malignant neoplasm (10/06/2022), Generalized contraction of visual field, right eye (01/29/2015), Hearing loss (03/10/2024), History of cataract (03/10/2024), History of thrombocytopenia (03/10/2024), Homonymous bilateral field defects, right side (04/29/2016), Hypertensive chronic kidney disease with stage 1 through stage 4 chronic kidney disease, or unspecified chronic kidney disease (07/02/2013), Laceration without foreign body, left foot, initial encounter (07/03/2018), Localized edema (03/10/2024), Mass of skin (03/10/2024), Migraine with aura, not intractable, without status migrainosus (10/24/2022), Open wound (03/10/2024), Open wound of left foot (03/10/2024), Other conditions influencing health status (07/02/2013), Other conditions influencing health status (07/02/2013), Other conditions influencing health status (07/02/2013), Other conditions influencing health status (05/22/2015), Other conditions influencing health status (10/24/2022), Other conditions influencing health status (03/14/2022), Other long term (current) drug therapy (10/24/2022), Overweight with body mass index (BMI) 25.0-29.9 (03/10/2024), Pain of toe (03/10/2024), Personal history of COVID-19 (04/04/2023), Personal  history of diseases of the blood and blood-forming organs and certain disorders involving the immune mechanism (07/02/2013), Personal history of diseases of the skin and subcutaneous tissue (08/11/2015), Personal history of nephrotic syndrome (07/02/2013), Personal history of other diseases of the circulatory system (08/27/2013), Personal history of other diseases of the nervous system and sense organs, Personal history of other diseases of the respiratory system, Personal history of other infectious and parasitic diseases (07/02/2013), Personal history of other specified conditions, Personal history of other specified conditions (08/27/2013), Posterior epistaxis (03/10/2024), Puckering of macula, right eye (10/24/2022), Raynaud's syndrome without gangrene (07/02/2013), Sinusitis (03/10/2024), Systemic lupus erythematosus, unspecified (Multi) (07/24/2015), Systemic lupus erythematosus, unspecified (Multi) (07/24/2015), Systemic lupus erythematosus, unspecified (Multi) (07/24/2015), 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) (07/02/2013), Type 2 diabetes mellitus with mild nonproliferative diabetic retinopathy without macular edema, left eye (Multi) (07/27/2015), Type 2 diabetes mellitus with mild nonproliferative diabetic retinopathy without macular edema, unspecified eye (Multi) (07/24/2015), Type 2 diabetes mellitus with proliferative diabetic retinopathy without macular edema, right eye (Multi) (07/27/2015), Type 2 diabetes mellitus with proliferative diabetic retinopathy without macular edema, unspecified eye (Multi) (07/24/2015), Unspecified acute and subacute iridocyclitis (07/24/2015), and Unspecified open wound, left foot, sequela (07/03/2018). presenting with adrenal insufficiency, diabetes .   Per admission History Of Present Illness  Bing Holliday is a 63 y.o. female from a [facility] facility with a past medical history of adrenocortical insufficiency, generalized weakness,  hyperparathyroidism, lupus, diabetes, COPD, protein calorie malnutrition, anxiety, Raynaud's, iron deficiency anemia, rheumatoid arthritis, chronic kidney disease, depression, pulmonary hypertension, cardiomyopathy, paroxysmal A-fib on Eliquis, gout, fibromyalgia, hypertension, hyperlipidemia, who was sent for abnormal lab workup.  Patient also is complaining of worsening of bilateral lower extremity swelling and wounds and blisters for the past week.  Bowel movement and appetite is fine  Patient denies chest pain, abdominal pain, nausea, vomiting, diarrhea, constipation, bleeding, urinary symptoms, headache, dizziness, head trauma, LOC, SOB      Past Medical History  She has a past medical history of Abnormal kidney function (04/04/2023), Acute headache (03/10/2024), Acute low back pain (10/30/2023), Acute upper respiratory infection, unspecified (03/04/2020), Acute upper respiratory infection, unspecified (09/30/2015), Arthritis, Atypical facial pain (03/10/2024), Body mass index (BMI) 23.0-23.9, adult (10/15/2021), Body mass index (BMI) 33.0-33.9, adult (03/04/2020), Cardiomegaly (08/27/2013), Chest pain (06/10/2022), Chronic kidney disease, stage 3 unspecified (Multi) (07/02/2013), Confusional state (03/10/2024), Contact with and (suspected) exposure to covid-19 (04/04/2023), Delirium (03/10/2024), Disease of pericardium, unspecified ([facility]) (07/02/2013), Encounter for follow-up examination after completed treatment for conditions other than malignant neoplasm (10/06/2022), Generalized contraction of visual field, right eye (01/29/2015), Hearing loss (03/10/2024), History of cataract (03/10/2024), History of thrombocytopenia (03/10/2024), Homonymous bilateral field defects, right side (04/29/2016), Hypertensive chronic kidney disease with stage 1 through stage 4 chronic kidney disease, or unspecified chronic kidney disease (07/02/2013), Laceration without foreign body, left foot, initial encounter  (07/03/2018), Localized edema (03/10/2024), Mass of skin (03/10/2024), Migraine with aura, not intractable, without status migrainosus (10/24/2022), Open wound (03/10/2024), Open wound of left foot (03/10/2024), Other conditions influencing health status (07/02/2013), Other conditions influencing health status (07/02/2013), Other conditions influencing health status (07/02/2013), Other conditions influencing health status (05/22/2015), Other conditions influencing health status (10/24/2022), Other conditions influencing health status (03/14/2022), Other long term (current) drug therapy (10/24/2022), Overweight with body mass index (BMI) 25.0-29.9 (03/10/2024), Pain of toe (03/10/2024), Personal history of COVID-19 (04/04/2023), Personal history of diseases of the blood and blood-forming organs and certain disorders involving the immune mechanism (07/02/2013), Personal history of diseases of the skin and subcutaneous tissue (08/11/2015), Personal history of nephrotic syndrome (07/02/2013), Personal history of other diseases of the circulatory system (08/27/2013), Personal history of other diseases of the nervous system and sense organs, Personal history of other diseases of the respiratory system, Personal history of other infectious and parasitic diseases (07/02/2013), Personal history of other specified conditions, Personal history of other specified conditions (08/27/2013), Posterior epistaxis (03/10/2024), Puckering of macula, right eye (10/24/2022), Raynaud's syndrome without gangrene (07/02/2013), Sinusitis (03/10/2024), Systemic lupus erythematosus, unspecified (Multi) (07/24/2015), Systemic lupus erythematosus, unspecified (Multi) (07/24/2015), Systemic lupus erythematosus, unspecified (Multi) (07/24/2015), 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) (07/02/2013), Type 2 diabetes mellitus with mild nonproliferative diabetic retinopathy without macular edema, left eye (Multi) (07/27/2015), Type  2 diabetes mellitus with mild nonproliferative diabetic retinopathy without macular edema, unspecified eye (Multi) (07/24/2015), Type 2 diabetes mellitus with proliferative diabetic retinopathy without macular edema, right eye (Multi) (07/27/2015), Type 2 diabetes mellitus with proliferative diabetic retinopathy without macular edema, unspecified eye (Multi) (07/24/2015), Unspecified acute and subacute iridocyclitis (07/24/2015), and Unspecified open wound, left foot, sequela (07/03/2018).    Surgical History  She has a past surgical history that includes Eye surgery (03/06/2015); Total hip arthroplasty (01/29/2015); Cholecystectomy (01/29/2015); Other surgical history (01/29/2015); Other surgical history (01/29/2015); Ankle surgery (01/29/2015); Foot surgery (01/29/2015); MR angio head wo IV contrast (7/26/2013); MR angio neck wo IV contrast (7/26/2013); CT guided percutaneous biopsy bone deep (5/4/2021); MR angio head wo IV contrast (9/17/2021); MR angio neck wo IV contrast (9/17/2021); MR angio neck wo IV contrast (3/25/2023); MR angio head wo IV contrast (3/25/2023); and Cardiac electrophysiology procedure (Left, 5/17/2024).     Social History  She reports that she has never smoked. She has never been exposed to tobacco smoke. She has never used smokeless tobacco. She reports that she does not currently use alcohol. She reports that she does not use drugs.    Family History  Family History   Problem Relation Name Age of Onset    Other (RENAL DISEASE) Mother          END STAGE    Other (CARDIAC DISORDER) Mother      Cataracts Mother      Stroke Mother      Diabetes Mother      Kidney disease Mother      Lupus Mother      Other (CARDIAC DISORDER) Father      COPD Father      Glaucoma Father      Hypertension Father      Sleep apnea Father      Other (CARDIAC DISORDER) Sister      Depression Sister      Kidney disease Sister      Sickle cell trait Sister      Sleep apnea Daughter          Allergies  Ace inhibitors,  Hydroxychloroquine, Lisinopril, Penicillins, and Sulfa (sulfonamide antibiotics)    Review of Systems  Per HPI    Past Medical History:   Diagnosis Date    Abnormal kidney function 04/04/2023    Acute headache 03/10/2024    Acute low back pain 10/30/2023    Acute upper respiratory infection, unspecified 03/04/2020    Acute URI    Acute upper respiratory infection, unspecified 09/30/2015    URTI (acute upper respiratory infection)    Arthritis     Atypical facial pain 03/10/2024    Body mass index (BMI) 23.0-23.9, adult 10/15/2021    BMI 23.0-23.9, adult    Body mass index (BMI) 33.0-33.9, adult 03/04/2020    BMI 33.0-33.9,adult    Cardiomegaly 08/27/2013    Left ventricular hypertrophy    Chest pain 06/10/2022    Comment on above: Added by Problem List Migration; 2013-3-12; Moved to Suppressed Nov 25 2013 9:16PM; Comment on above: Added by Problem List Migration; 2013-3-12; Moved to Suppressed Nov 25 2013 9:16PM;    Chronic kidney disease, stage 3 unspecified (Multi) 07/02/2013    Chronic kidney disease, stage III (moderate)    Confusional state 03/10/2024    Contact with and (suspected) exposure to covid-19 04/04/2023    Delirium 03/10/2024    Disease of pericardium, unspecified ([facility]) 07/02/2013    Pericardial disease    Encounter for follow-up examination after completed treatment for conditions other than malignant neoplasm 10/06/2022    Hospital discharge follow-up    Generalized contraction of visual field, right eye 01/29/2015    Generalized contraction of visual field of right eye    Hearing loss 03/10/2024    History of cataract 03/10/2024    History of thrombocytopenia 03/10/2024    Comment on above: Added by Problem List Migration; 2013-7-2;    Homonymous bilateral field defects, right side 04/29/2016    Homonymous bilateral field defects of right side    Hypertensive chronic kidney disease with stage 1 through stage 4 chronic kidney disease, or unspecified chronic kidney disease 07/02/2013     Nephrosclerosis    Laceration without foreign body, left foot, initial encounter 07/03/2018    Foot laceration, left, initial encounter    Localized edema 03/10/2024    Mass of skin 03/10/2024    Migraine with aura, not intractable, without status migrainosus 10/24/2022    Ocular migraine    Open wound 03/10/2024    Open wound of left foot 03/10/2024    Other conditions influencing health status 07/02/2013    Chronic Glomerulonephritis In Diseases Classified Elsewhere    Other conditions influencing health status 07/02/2013    Progressive Familial Myoclonic Epilepsy    Other conditions influencing health status 07/02/2013    Protein S Deficiency    Other conditions influencing health status 05/22/2015    Familial Combined Hyperlipidemia    Other conditions influencing health status 10/24/2022    IDDM (insulin dependent diabetes mellitus)    Other conditions influencing health status 03/14/2022    Diabetes mellitus, insulin dependent (IDDM), uncontrolled    Other long term (current) drug therapy 10/24/2022    Long-term use of Plaquenil    Overweight with body mass index (BMI) 25.0-29.9 03/10/2024    Pain of toe 03/10/2024    Personal history of COVID-19 04/04/2023    Personal history of diseases of the blood and blood-forming organs and certain disorders involving the immune mechanism 07/02/2013    History of thrombocytopenia    Personal history of diseases of the skin and subcutaneous tissue 08/11/2015    History of foot ulcer    Personal history of nephrotic syndrome 07/02/2013    History of nephrotic syndrome    Personal history of other diseases of the circulatory system 08/27/2013    History of sinus tachycardia    Personal history of other diseases of the nervous system and sense organs     History of cataract    Personal history of other diseases of the respiratory system     History of bronchitis    Personal history of other infectious and parasitic diseases 07/02/2013    History of hepatitis    Personal  history of other specified conditions     History of shortness of breath    Personal history of other specified conditions 08/27/2013    History of edema    Posterior epistaxis 03/10/2024    Puckering of macula, right eye 10/24/2022    ERM OD (epiretinal membrane, right eye)    Raynaud's syndrome without gangrene 07/02/2013    Raynaud's disease    Sinusitis 03/10/2024    Systemic lupus erythematosus, unspecified (Multi) 07/24/2015    SLE (systemic lupus erythematosus)    Systemic lupus erythematosus, unspecified (Multi) 07/24/2015    SLE (systemic lupus erythematosus)    Systemic lupus erythematosus, unspecified (Multi) 07/24/2015    Systemic lupus    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) 07/02/2013    Type 2 diabetes with nephropathy    Type 2 diabetes mellitus with mild nonproliferative diabetic retinopathy without macular edema, left eye (Multi) 07/27/2015    Non-proliferative diabetic retinopathy, left eye    Type 2 diabetes mellitus with mild nonproliferative diabetic retinopathy without macular edema, unspecified eye (Multi) 07/24/2015    Mild non proliferative diabetic retinopathy    Type 2 diabetes mellitus with proliferative diabetic retinopathy without macular edema, right eye (Multi) 07/27/2015    Proliferative diabetic retinopathy of right eye    Type 2 diabetes mellitus with proliferative diabetic retinopathy without macular edema, unspecified eye (Multi) 07/24/2015    Diabetic proliferative retinopathy    Unspecified acute and subacute iridocyclitis 07/24/2015    Acute iritis, right eye    Unspecified open wound, left foot, sequela 07/03/2018    Wound, open, foot, left, sequela       Past Surgical History:   Procedure Laterality Date    ANKLE SURGERY  01/29/2015    Ankle Surgery    CARDIAC ELECTROPHYSIOLOGY PROCEDURE Left 5/17/2024    Procedure: PPM IMPLANT DUAL;  Surgeon: Antonio Rodriges MD;  Location: [facility];  Service: Electrophysiology;  Laterality: Left;  Pt.  needs platelets and Prednisone prior to procedure    CHOLECYSTECTOMY  01/29/2015    Cholecystectomy    CT GUIDED PERCUTANEOUS BIOPSY BONE DEEP  5/4/2021    CT GUIDED PERCUTANEOUS BIOPSY BONE DEEP 5/4/2021 [facility]    EYE SURGERY  03/06/2015    Eye Surgery    FOOT SURGERY  01/29/2015    Foot Surgery    MR HEAD ANGIO WO IV CONTRAST  7/26/2013    MR HEAD ANGIO WO IV CONTRAST 7/26/2013 [facility]ICAL [facility]GAC[facility]    MR HEAD ANGIO WO IV CONTRAST  9/17/2021    MR HEAD ANGIO WO IV CONTRAST 9/17/2021 [facility]    MR HEAD ANGIO WO IV CONTRAST  3/25/2023    MR HEAD ANGIO WO IV CONTRAST [facility]    MR NECK ANGIO WO IV CONTRAST  7/26/2013    MR NECK ANGIO WO IV CONTRAST 7/26/2013 [facility]GAC[facility]    MR NECK ANGIO WO IV CONTRAST  9/17/2021    MR NECK ANGIO WO IV CONTRAST 9/17/2021 [facility]    MR NECK ANGIO WO IV CONTRAST  3/25/2023    MR NECK ANGIO WO IV CONTRAST [facility]    OTHER SURGICAL HISTORY  01/29/2015    Creation Of Pericardial Window    OTHER SURGICAL HISTORY  01/29/2015    Quadricepsplasty    TOTAL HIP ARTHROPLASTY  01/29/2015    Hip Replacement       Social History     Socioeconomic History    Marital status:      Spouse name: Not on file    Number of children: Not on file    Years of education: Not on file    Highest education level: Not on file   Occupational History    Not on file   Tobacco Use    Smoking status: Never     Passive exposure: Never    Smokeless tobacco: Never   Vaping Use    Vaping status: Never Used   Substance and Sexual Activity    Alcohol use: Not Currently     Comment: RARE    Drug use: Never    Sexual activity: Defer   Other Topics Concern    Not on file   Social History Narrative    Not on file     Social Determinants of Health     Financial Resource Strain: Low Risk  (6/4/2024)    Overall Financial Resource Strain (CARDIA)     Difficulty of Paying Living Expenses: Not very hard   Food Insecurity: Patient Unable To Answer (3/9/2024)    Hunger Vital Sign     " Worried About Running Out of Food in the Last Year: Patient unable to answer     Ran Out of Food in the Last Year: Patient unable to answer   Transportation Needs: No Transportation Needs (6/4/2024)    PRAPARE - Transportation     Lack of Transportation (Medical): No     Lack of Transportation (Non-Medical): No   Physical Activity: Patient Declined (1/15/2024)    Exercise Vital Sign     Days of Exercise per Week: Patient declined     Minutes of Exercise per Session: Patient declined   Stress: No Stress Concern Present (1/15/2024)    [employer] - Occupational Stress Questionnaire     Feeling of Stress : Not at all   Social Connections: Unknown (1/15/2024)    Social Connection and Isolation Panel [NHANES]     Frequency of Communication with Friends and Family: More than three times a week     Frequency of Social Gatherings with Friends and Family: More than three times a week     Attends Nondenominational Services: Patient declined     Active Member of Clubs or Organizations: Patient declined     Attends Club or Organization Meetings: Patient declined     Marital Status: Patient unable to answer   Intimate Partner Violence: Not At Risk (1/15/2024)    Humiliation, Afraid, Rape, and Kick questionnaire     Fear of Current or Ex-Partner: No     Emotionally Abused: No     Physically Abused: No     Sexually Abused: No   Housing Stability: High Risk (6/4/2024)    Housing Stability Vital Sign     Unable to Pay for Housing in the Last Year: No     Number of Places Lived in the Last Year: 3     Unstable Housing in the Last Year: No        Physical Exam   deferred  ROS, PMH, FH/SH, surgical history and allergies have been reviewed.    Last Recorded Vitals  Blood pressure 120/73, pulse 61, temperature 35.2 °C (95.4 °F), resp. rate 16, height 1.778 m (5' 10\"), weight 95.3 kg (210 lb), SpO2 100%.    Relevant Results  Results from last 7 days   Lab Units 06/08/24 2022 06/08/24  1625 06/08/24  1125 " 06/08/24  0951 06/08/24  0931 06/08/24  0904 06/08/24  0730 06/07/24  0723 06/07/24  0542 06/06/24  0733 06/06/24  0618 06/05/24  0735 06/05/24  0622 06/04/24  2222 06/04/24  1458   POCT GLUCOSE mg/dL 93 91 82 122* 98   < >  --    < >  --    < >  --    < >  --    < >  --    GLUCOSE mg/dL  --   --   --   --   --   --  63*  --  134*  137*  --  72*  --  158*  --  155*    < > = values in this interval not displayed.     Lab Results   Component Value Date    HGBA1C 6.8 (H) 06/04/2024      Lab Results   Component Value Date    CORTISOL 60.3 (H) 05/07/2024    ACTH 2.5 (L) 11/21/2023     06/08/2024    K 4.3 06/08/2024     06/08/2024    CO2 33 (H) 06/08/2024    BUN 47 (H) 06/08/2024    CREATININE 2.28 (H) 06/08/2024    CALCIUM 9.0 06/08/2024    PROT 6.2 (L) 06/08/2024    BILITOT 0.3 06/08/2024    ALKPHOS 170 (H) 06/08/2024    ALT 17 06/08/2024    AST 18 06/08/2024    GLUCOSE 63 (L) 06/08/2024        Anatoliy Banuelos MD FACE  Office phone - [phone]  Fax - [phone]  Address: [address] [facility] [address]  Address: [address] [id number]  6/8/2024  11:40 PM

## 2024-06-09 NOTE — CONSULTS
Consults  Referred by Dr Cayden Cruz MD: Claudio Hood DO    Reason For Consult  Skin infection    History Of Present Illness  Bing Holliday is a 63 y.o. female with past medical history of diabetes mellitus type 2, systemic lupus erythematosus, COPD, adrenal insufficiency, rheumatoid arthritis and Raynaud's disease, chronic kidney disease stage III, major depression, pulmonary hypertension, paroxysmal atrial fibrillation, gouty arthritis and fibromyalgia, hypertension and hyperlipidemia, seizure disorder who was admitted from skilled nursing facility for abnormal lab work.  Patient was hospitalized last month and had left chest pacemaker placed.  She had extensive bilateral legs edema and blisters, currently on hold to.  Patient has not been eating and has been refusing to take her medications by mouth.  Patient has been restless and reacts with discomfort and pain when touched all over.  She is incontinent with clear urine.  Good urine output noted.  She had 1 large soft bowel movement.   Left chest pacemaker site with mild swelling without any drainage.   Patient is not able to provide any review of system or history  Patient was started on IV cefepime on admission on June 5  Past Medical History  She has a past medical history of Abnormal kidney function (04/04/2023), Acute headache (03/10/2024), Acute low back pain (10/30/2023), Acute upper respiratory infection, unspecified (03/04/2020), Acute upper respiratory infection, unspecified (09/30/2015), Arthritis, Atypical facial pain (03/10/2024), Body mass index (BMI) 23.0-23.9, adult (10/15/2021), Body mass index (BMI) 33.0-33.9, adult (03/04/2020), Cardiomegaly (08/27/2013), Chest pain (06/10/2022), Chronic kidney disease, stage 3 unspecified (Multi) (07/02/2013), Confusional state (03/10/2024), Contact with and (suspected) exposure to covid-19 (04/04/2023), Delirium (03/10/2024), Disease of pericardium, unspecified ([facility]) (07/02/2013), Encounter  for follow-up examination after completed treatment for conditions other than malignant neoplasm (10/06/2022), Generalized contraction of visual field, right eye (01/29/2015), Hearing loss (03/10/2024), History of cataract (03/10/2024), History of thrombocytopenia (03/10/2024), Homonymous bilateral field defects, right side (04/29/2016), Hypertensive chronic kidney disease with stage 1 through stage 4 chronic kidney disease, or unspecified chronic kidney disease (07/02/2013), Laceration without foreign body, left foot, initial encounter (07/03/2018), Localized edema (03/10/2024), Mass of skin (03/10/2024), Migraine with aura, not intractable, without status migrainosus (10/24/2022), Open wound (03/10/2024), Open wound of left foot (03/10/2024), Other conditions influencing health status (07/02/2013), Other conditions influencing health status (07/02/2013), Other conditions influencing health status (07/02/2013), Other conditions influencing health status (05/22/2015), Other conditions influencing health status (10/24/2022), Other conditions influencing health status (03/14/2022), Other long term (current) drug therapy (10/24/2022), Overweight with body mass index (BMI) 25.0-29.9 (03/10/2024), Pain of toe (03/10/2024), Personal history of COVID-19 (04/04/2023), Personal history of diseases of the blood and blood-forming organs and certain disorders involving the immune mechanism (07/02/2013), Personal history of diseases of the skin and subcutaneous tissue (08/11/2015), Personal history of nephrotic syndrome (07/02/2013), Personal history of other diseases of the circulatory system (08/27/2013), Personal history of other diseases of the nervous system and sense organs, Personal history of other diseases of the respiratory system, Personal history of other infectious and parasitic diseases (07/02/2013), Personal history of other specified conditions, Personal history of other specified conditions (08/27/2013),  Posterior epistaxis (03/10/2024), Puckering of macula, right eye (10/24/2022), Raynaud's syndrome without gangrene (07/02/2013), Sinusitis (03/10/2024), Systemic lupus erythematosus, unspecified (Multi) (07/24/2015), Systemic lupus erythematosus, unspecified (Multi) (07/24/2015), Systemic lupus erythematosus, unspecified (Multi) (07/24/2015), 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) (07/02/2013), Type 2 diabetes mellitus with mild nonproliferative diabetic retinopathy without macular edema, left eye (Multi) (07/27/2015), Type 2 diabetes mellitus with mild nonproliferative diabetic retinopathy without macular edema, unspecified eye (Multi) (07/24/2015), Type 2 diabetes mellitus with proliferative diabetic retinopathy without macular edema, right eye (Multi) (07/27/2015), Type 2 diabetes mellitus with proliferative diabetic retinopathy without macular edema, unspecified eye (Multi) (07/24/2015), Unspecified acute and subacute iridocyclitis (07/24/2015), and Unspecified open wound, left foot, sequela (07/03/2018).    Surgical History  She has a past surgical history that includes Eye surgery (03/06/2015); Total hip arthroplasty (01/29/2015); Cholecystectomy (01/29/2015); Other surgical history (01/29/2015); Other surgical history (01/29/2015); Ankle surgery (01/29/2015); Foot surgery (01/29/2015); MR angio head wo IV contrast (7/26/2013); MR angio neck wo IV contrast (7/26/2013); CT guided percutaneous biopsy bone deep (5/4/2021); MR angio head wo IV contrast (9/17/2021); MR angio neck wo IV contrast (9/17/2021); MR angio neck wo IV contrast (3/25/2023); MR angio head wo IV contrast (3/25/2023); and Cardiac electrophysiology procedure (Left, 5/17/2024).     Social History     Occupational History    Not on file   Tobacco Use    Smoking status: Never     Passive exposure: Never    Smokeless tobacco: Never   Vaping Use    Vaping status: Never Used   Substance and Sexual Activity    Alcohol use: Not  Currently     Comment: RARE    Drug use: Never    Sexual activity: Defer     Travel History   Travel since 05/09/24    No documented travel since 05/09/24            Family History  Family History   Problem Relation Name Age of Onset    Other (RENAL DISEASE) Mother          END STAGE    Other (CARDIAC DISORDER) Mother      Cataracts Mother      Stroke Mother      Diabetes Mother      Kidney disease Mother      Lupus Mother      Other (CARDIAC DISORDER) Father      COPD Father      Glaucoma Father      Hypertension Father      Sleep apnea Father      Other (CARDIAC DISORDER) Sister      Depression Sister      Kidney disease Sister      Sickle cell trait Sister      Sleep apnea Daughter       Allergies  Ace inhibitors, Hydroxychloroquine, Lisinopril, Penicillins, and Sulfa (sulfonamide antibiotics)     Immunization History   Administered Date(s) Administered    Influenza, Unspecified 10/06/2022    Influenza, seasonal, injectable 10/29/2011, 10/09/2015, 09/12/2016, 10/06/2017, 10/15/2021, 10/06/2022    PPD Test 01/21/2015    Pfizer Purple Cap SARS-CoV-2 03/25/2021, 04/15/2021    Pneumococcal conjugate vaccine, 13-valent (PREVNAR 13) 10/06/2017    Pneumococcal polysaccharide vaccine, 23-valent, age 2 years and older (PNEUMOVAX 23) 01/01/2014    Tdap vaccine, age 7 year and older (BOOSTRIX, ADACEL) 01/18/2020     Medications  Home medications:  Medications Prior to Admission   Medication Sig Dispense Refill Last Dose    apixaban (Eliquis) 2.5 mg tablet Take 1 tablet (2.5 mg) by mouth 2 times a day. 60 tablet 1 6/4/2024    atorvastatin (Lipitor) 40 mg tablet Take 1 tablet (40 mg) by mouth once daily. (Patient taking differently: Take 1 tablet (40 mg) by mouth once daily at bedtime.) 90 tablet 3 6/3/2024    fludrocortisone (Florinef) 0.1 mg tablet Take 1 tablet (0.1 mg) by mouth every other day.   6/3/2024    fluticasone-umeclidin-vilanter (TRELEGY-ELLIPTA) 200-62.5-25 mcg blister with device Inhale 1 puff once daily. 60  each 5 2024    folic acid (Folvite) 1 mg tablet TAKE 1 TABLET BY MOUTH EVERY DAY 90 tablet 1 2024    furosemide (Lasix) 20 mg tablet Take 2 tablets (40 mg) by mouth once daily.   2024    guaiFENesin (Mucinex) 600 mg 12 hr tablet Take 2 tablets (1,200 mg) by mouth 2 times a day. Do not crush, chew, or split.   2024    hydrocortisone (Cortef) 10 mg tablet Take 1 tablet (10 mg) by mouth once daily at bedtime.   6/3/2024    hydrocortisone (Cortef) 20 mg tablet Take 1 tablet (20 mg) by mouth once daily.   2024    insulin glargine (Lantus U-100 Insulin) 100 unit/mL injection Inject 10 Units under the skin once daily in the morning. Take as directed per insulin instructions.   2024    insulin lispro (HumaLOG) 100 unit/mL injection Inject under the skin 3 times a day as needed for high blood sugar (before meals). Take as directed per insulin Sliding Scale instructions.  0-150 = 0 units  151-200 = 2 units  201-250 = 4 units  251-300 = 6 units  301-350 = 8 units  351-400 = 10 units  >400 = give 10 units and contact MD   2024 at noon    levETIRAcetam (Keppra) 500 mg tablet Take 1 tablet (500 mg) by mouth every 12 hours. 60 tablet 0 2024    levoFLOXacin (Levaquin) 500 mg tablet Take 1 tablet (500 mg) by mouth once daily. X 10 days   2024    [] magnesium oxide (Mag-Ox) 400 mg (241.3 mg magnesium) tablet Take 2 tablets (800 mg) by mouth once daily. Do not fill before May 1, 2024.   2024    melatonin 5 mg tablet Take 1 tablet (5 mg) by mouth once daily at bedtime.   6/3/2024    metoprolol tartrate (Lopressor) 25 mg tablet Take 1 tablet (25 mg) by mouth 2 times a day.   2024    multivitamin with minerals tablet Take 1 tablet by mouth once daily.   2024    mycophenolate (Cellcept) 500 mg tablet TAKE 2 TABLETS BY MOUTH TWICE A  tablet 0 2024    OLANZapine (ZyPREXA) 7.5 mg tablet Take 1 tablet (7.5 mg) by mouth once daily at bedtime.   2024    pantoprazole  "(ProtoNix) 40 mg EC tablet TAKE 1 TABLET BY MOUTH EVERY DAY 90 tablet 1 6/4/2024    potassium chloride CR 20 mEq ER tablet Take 1 tablet (20 mEq) by mouth once daily. Do not crush or chew.   6/4/2024    thiamine 100 mg tablet Take 1 tablet (100 mg) by mouth once daily.   6/4/2024    acetaminophen (Tylenol) 325 mg tablet Take 2 tablets (650 mg) by mouth every 4 hours if needed for mild pain (1 - 3), moderate pain (4 - 6) or fever (temp greater than 38.0 C).   Unknown    albuterol 90 mcg/actuation inhaler Inhale 2 puffs every 6 hours if needed for shortness of breath or wheezing.   Unknown    atovaquone (Mepron) 750 mg/5 mL suspension Take 10 mL (1,500 mg) by mouth once daily.       balsam peru-castor oiL (Venelex) ointment Apply topically 2 times a day. APPLY TO BUTTOCKS, SACRUM, AND THIGHS.   Unknown    blood-glucose sensor (Dexcom G6 Sensor) device Use to check sugars 3 times daily 4 each 2     Dexcom G4 platinum  (Dexcom G6 ) misc Use as instructed 1 each 0     Dexcom G4 platinum transmitter (Dexcom G6 Transmitter) device Use as instructed 1 each 0     glucagon HCL (Glucagon, HCl, Emergency Kit) 1 mg recon soln Inject 1 mg into the muscle if needed.   Unknown    meclizine (Antivert) 25 mg tablet Take 1 tablet (25 mg) by mouth every 12 hours if needed for dizziness.   Unknown    nut.tx.gluc intol,lf,soy/fiber (BOOST GLUCOSE CONTROL ORAL) Take 8 fluid ounces by mouth once daily in the morning.       OLANZapine (ZyPREXA) 5 mg tablet Take 1 tablet (5 mg) by mouth once daily at bedtime.       pen needle, diabetic 31 gauge x 5/16\" needle Use to inject 1-4 times daily as directed. 100 each 11      Current medications:  Scheduled medications  apixaban, 2.5 mg, oral, BID  atorvastatin, 40 mg, oral, Nightly  cefepime, 1 g, intravenous, q24h  fludrocortisone, 0.1 mg, oral, Every other day  tiotropium, 2 puff, inhalation, Daily   And  fluticasone furoate-vilanteroL, 1 puff, inhalation, Daily  folic acid, 1 mg, " oral, Daily  hydrocortisone sodium succinate, 100 mg, intravenous, q8h  insulin glargine, 10 Units, subcutaneous, q AM  insulin lispro, 0-10 Units, subcutaneous, TID  levETIRAcetam, 500 mg, oral, q12h  melatonin, 5 mg, oral, Nightly  metoprolol tartrate, 25 mg, oral, BID  mycophenolate, 1,000 mg, oral, BID  OLANZapine, 5 mg, oral, Nightly  pantoprazole, 40 mg, oral, Daily  thiamine, 100 mg, oral, Daily  torsemide, 40 mg, oral, Daily      Continuous medications     PRN medications  PRN medications: acetaminophen **OR** acetaminophen, dextrose, dextrose, glucagon, glucagon, HYDROcodone-acetaminophen, meclizine    Review of Systems   unable to provide ROS because of altered mentation    Objective  Range of Vitals (last 24 hours)  Heart Rate:  [60-76]   Temp:  [35 °C (95 °F)-35.5 °C (95.9 °F)]   Resp:  [14-16]   BP: (120-129)/(73-81)   SpO2:  [93 %-100 %]   Daily Weight  06/04/24 : 95.3 kg (210 lb)    Body mass index is 30.13 kg/m².     Physical Exam  Vitals:    06/08/24 1929 06/09/24 0117 06/09/24 0521 06/09/24 1015   BP: 120/73 123/80 124/78 129/81   BP Location:  Right arm     Patient Position:  Lying     Pulse: 61 60 71 76   Resp:  14  16   Temp: 35.2 °C (95.4 °F) 35 °C (95 °F) 35.1 °C (95.2 °F) 35.5 °C (95.9 °F)   TempSrc:  Temporal  Temporal   SpO2: 100% 100% 100% 93%   Weight:       Height:         General Appearance: Awake, does not follow any commands, restless, in no acute distress  Yells with discomfort when touched anywhere  On room air  Does not follow significant commands  Moves upper extremities  Skin: warm and dry, no rash.   Head: normocephalic and atraumatic  Eyes: anicteric sclerae  ENT:  normal mucous membranes.   Deformed bilateral hands  Lungs: normal respiratory effort, clear lungs  Left chest pacemaker site with small amount of fluid, no erythema or drainage  Heart normal S1-S2 distant heart sounds  Abdomen: soft, no distention or rigidity  Mild left leg swelling  Healed left knee prosthetic  joint  Bilateral legs with arms of the blisters, positive tenderness.  No erythema or warmth     Labs  Results from last 72 hours   Lab Units 06/09/24  0916 06/07/24  1504 06/07/24  0542   WBC AUTO x10*3/uL 5.2 5.1 3.7*   HEMOGLOBIN g/dL 8.2* 8.5* 8.5*   HEMATOCRIT % 26.7* 28.4* 27.9*   PLATELETS AUTO x10*3/uL 95* 93* 85*     Results from last 72 hours   Lab Units 06/09/24  0916 06/08/24  0730 06/07/24  0542   SODIUM mmol/L 145 145 143  143   POTASSIUM mmol/L 4.4 4.3 4.8  4.8   CHLORIDE mmol/L 104 104 106  106   CO2 mmol/L 27 33* 28  30   BUN mg/dL 57* 47* 44*  45*   CREATININE mg/dL 2.16* 2.28* 2.10*  2.15*   GLUCOSE mg/dL 111* 63* 134*  137*   CALCIUM mg/dL 8.6 9.0 8.8  9.0   ANION GAP mmol/L 18 12 14  12   EGFR mL/min/1.73m*2 25* 24* 26*  25*   PHOSPHORUS mg/dL  --   --  4.7     Results from last 72 hours   Lab Units 06/09/24  0916 06/08/24  0730 06/07/24  0542   ALK PHOS U/L 175* 170*  --    BILIRUBIN TOTAL mg/dL 0.3 0.3  --    PROTEIN TOTAL g/dL 6.5 6.2*  --    ALT U/L 13 17  --    AST U/L 20 18  --    ALBUMIN g/dL 3.3* 3.0* 2.9*     Estimated Creatinine Clearance: 33.8 mL/min (A) (by C-G formula based on SCr of 2.16 mg/dL (H)).  C-Reactive Protein   Date Value Ref Range Status   05/10/2024 1.02 (H) <1.00 mg/dL Final   05/07/2024 2.10 (H) <1.00 mg/dL Final   04/26/2024 7.29 (H) <1.00 mg/dL Final     Sedimentation Rate   Date Value Ref Range Status   05/10/2024 29 0 - 30 mm/h Final   04/26/2024 41 (H) 0 - 30 mm/h Final   03/19/2024 59 (H) 0 - 30 mm/h Final     HIV 1/2 Antigen/Antibody Screen with Reflex to Confirmation   Date Value Ref Range Status   05/09/2024 Nonreactive Nonreactive Final     Hepatitis C Ab   Date Value Ref Range Status   12/08/2017 NON-REACTIVE NONREACTIVE Final     Comment:      Patients receiving more than 5 mg/day of biotin may have interference   in test results.  A sample should be taken no sooner than eight hours   after  previous dose. Contact [phone] for additional  information.        Microbiology  Susceptibility data from last 90 days.  Collected Specimen Info Organism Ampicillin Ciprofloxacin Daptomycin Levofloxacin Linezolid Nitrofurantoin Penicillin Trimethoprim/Sulfamethoxazole Vancomycin   04/26/24 Urine from Clean Catch/Voided Enterococcus faecium R R SDD R S S R R R   Blood cultures collected yesterday are pending  Blood cultures collected on June 5 have been negative  Positive anemia and acute kidney injury on chronic kidney disease noted  3 weeks ago her creatinine was 1.4  Creatinine on admission is 2.24  Imaging  X-ray of the legs with diffuse edema of bilateral legs  Severe bilateral knee osteoarthritis  Diffuse ankylosis of left foot  Severe left tibiotalar osteoarthrosis  Assessment/Plan       IMPRESSION:    Altered mentation with agitation and confusion, possible side effects from cefepime  Bilateral lower extremity cellulitis, unroofed blisters secondary to edema, improving  Generalized pain, could be related to underlying rheumatoid arthritis and fibromyalgia/depression  Acute kidney injury on chronic kidney disease    PLAN:  DC IV cefepime since it may be contributing to altered mentation  Start IV Rocephin for now  Continue local wound care to bilateral legs  Follow-up with nephrology for renal management  Would recommend a neurology consult if lack of clinical improvement    Zeny Vicente MD

## 2024-06-09 NOTE — PROGRESS NOTES
Bing Holliday is a 63 y.o. female on day 5 of admission presenting with Cellulitis of leg without foot.      Subjective   Patient was admitted on 6/4 sent from nursing of facility due to abnormal blood work.  The patient was complaining of bilateral lower extremity swelling and wound as well.  She was admitted and has been treated for leg cellulitis.    This morning she is not hypothermic anymore.  Does not follow commands.  Does not answer questions appropriately.  She has been refusing her medication  since yesterday.  She has also refused blood draw this morning.  She was given 1 dose of IV Keppra last night.  She refused her oral Keppra.               Objective     Last Recorded Vitals  /81   Pulse 76   Temp 35.5 °C (95.9 °F) (Temporal)   Resp 16   Wt 95.3 kg (210 lb)   SpO2 93%   Intake/Output last 3 Shifts:    Intake/Output Summary (Last 24 hours) at 6/9/2024 1411  Last data filed at 6/9/2024 0751  Gross per 24 hour   Intake 100 ml   Output 1500 ml   Net -1400 ml       Admission Weight  Weight: 95.3 kg (210 lb) (06/04/24 1313)    Daily Weight  06/04/24 : 95.3 kg (210 lb)    Image Results  XR ankle bilateral complete minimum 3 views, XR tibia fibula bilateral 2 views  Narrative: Interpreted By:  Jaziel Montalvo,   STUDY:  XR ANKLE BILATERAL COMPLETE MINIMUM 3 VIEWS; XR TIBIA FIBULA  BILATERAL 2 VIEWS; ;  6/5/2024 8:26 pm      INDICATION:  Signs/Symptoms:bilateral lower extremity wounds.      COMPARISON:  None.      ACCESSION NUMBER(S):  [id number]; [id number]      ORDERING CLINICIAN:  LUDWIN MCNEILL      FINDINGS:  Osteopenic bone.      RIGHT:  There is diffuse edema of the right leg and ankle. No radiopaque  foreign bodies or soft tissue gas. There is a rounded centrally  lucent calcification of the lateral right thigh likely representing  an area of fat necrosis. Scattered phleboliths in the pretibial soft  tissues are likely reflective of venous stasis edema. No definite  osseous  destruction or erosive change. No acute fracture or  malalignment. There is severe osteoarthrosis of the right knee with  bone-on-bone contact and exuberant osteophyte formation. There is  prominent dorsal midfoot spurring and 5th tarsometatarsal spurring.  There is internal fixation hardware with a single screw extending  from the 1st metatarsal diaphysis across the Lisfranc joint to the  middle cuneiform bone. There is minimal perihardware lucency at the  head of the screw.          LEFT:  There is diffuse edema of the soft tissues without radiopaque foreign  body or gas. There is severe left knee osteoarthrosis with  bone-on-bone contact and prominent osteophytosis. There is a metallic  cerclage wire extending through the patella and the tibial  tuberosity. Limited evaluation of the ankle due to contracture. There  is severe hindfoot valgus. There is diffuse ankylosis of the hindfoot  and midfoot articulations with abandoned arthrodesis hardware tracts  noted. There is advanced tibiotalar osteoarthrosis. There is a remote  fracture deformity of the distal fibular and metadiaphysis. No  definite osseous destruction or erosive change. No evidence of acute  fracture.      Impression: 1. Diffuse edema of the bilateral legs without radiopaque foreign  body or gas. No radiographic evidence of acute osteomyelitis.      2. Severe bilateral knee osteoarthrosis (right > left).      3. Diffuse ankylosis of the left hindfoot and midfoot articulations,  severe left tibiotalar osteoarthrosis common severe hindfoot valgus.      Multiple potential etiologies of atraumatic pain as described in  detail above, depending on location and clinical context.      MACRO:  None.      Signed by: Jaziel Montalvo 6/5/2024 10:15 PM  Dictation workstation:   [id number]      Physical Exam  Constitutional:       Appearance: She is ill-appearing.   HENT:      Head: Normocephalic and atraumatic.      Nose: Nose normal.   Eyes:      Extraocular  Movements: Extraocular movements intact.      Pupils: Pupils are equal, round, and reactive to light.   Cardiovascular:      Rate and Rhythm: Normal rate. Rhythm irregular.      Heart sounds: Normal heart sounds.   Pulmonary:      Effort: Pulmonary effort is normal.      Breath sounds: Normal breath sounds.   Abdominal:      General: Bowel sounds are normal.      Palpations: Abdomen is soft.      Tenderness: There is abdominal tenderness.   Musculoskeletal:      Cervical back: Neck supple.   Skin:     General: Skin is warm and dry.   Neurological:      General: No focal deficit present.      Mental Status: She is alert. Mental status is at baseline.   Psychiatric:         Mood and Affect: Mood normal.         Relevant Results               Assessment/Plan                  Principal Problem:    Cellulitis of leg without foot    leg cellulitis.  She was started on cefepime since admission.  I did consult ID.  ID recommended stopping cefepime for possible side effect.  Started on IV Rocephin for now.  If diarrhea, check C. difficile.      Confusion.  No clear etiology.  Definitely not related to renal failure or sodium abnormality.  Obtain CT head.  Obtain neurology consult.  ID changed antibiotics from cefepime to Rocephin.      Adrenal insufficiency.  Continue Florinef.  I stopped hydrocortisone orally and started her on IV.  Endocrinology has been consulted and I appreciate their input.    Abdominal pain.  Waiting for CT abdomen, pelvis result.    Hypernatremia appears to be stable.  Nephrology is following.    Renal failure, acute on chronic, She is on torsemide 40 mg daily.  Nephrology is on board.  She takes Lasix at home.    Immunocompromise.  Continue cefepime.    History of paroxysmal A-fib.  She is on Eliquis.                Neno Rodarte MD

## 2024-06-09 NOTE — PROGRESS NOTES
[facility]ITI notified by [facility] [place] that they have attempted to reach pt son and left a VM with no return call as of this time. SW contacted son López, López states he has not received a call from the facility. TITI provided López with facility phone number. TITI sent a message to the facility to confirm they have the correct number and requested they contact pt son again.

## 2024-06-09 NOTE — CARE PLAN
The patient's goals for the shift include to get back to normal    The clinical goals for the shift include take medications    Problem: Nutrition  Goal: Less than 5 days NPO/clear liquids  Outcome: Progressing  Goal: Oral intake greater than 50%  Outcome: Progressing  Goal: Oral intake greater 75%  Outcome: Progressing  Goal: Consume prescribed supplement  Outcome: Progressing  Goal: Adequate PO fluid intake  Outcome: Progressing  Goal: Nutrition support goals are met within 48 hrs  Outcome: Progressing  Goal: Nutrition support is meeting 75% of nutrient needs  Outcome: Progressing  Goal: Tube feed tolerance  Outcome: Progressing  Goal: BG  mg/dL  Outcome: Progressing  Goal: Lab values WNL  Outcome: Progressing  Goal: Electrolytes WNL  Outcome: Progressing  Goal: Promote healing  Outcome: Progressing  Goal: Maintain stable weight  Outcome: Progressing  Goal: Reduce weight from edema/fluid  Outcome: Progressing  Goal: Gradual weight gain  Outcome: Progressing  Goal: Improve ostomy output  Outcome: Progressing     Problem: Discharge Planning  Goal: Discharge to home or other facility with appropriate resources  Outcome: Progressing     Problem: Chronic Conditions and Co-morbidities  Goal: Patient's chronic conditions and co-morbidity symptoms are monitored and maintained or improved  Outcome: Progressing     Problem: Pain  Goal: Turns in bed with improved pain control throughout the shift  Outcome: Progressing  Goal: Walks with improved pain control throughout the shift  Outcome: Progressing  Goal: Performs ADL's with improved pain control throughout shift  Outcome: Progressing  Goal: Participates in PT with improved pain control throughout the shift  Outcome: Progressing  Goal: Free from opioid side effects throughout the shift  Outcome: Progressing  Goal: Free from acute confusion related to pain meds throughout the shift  Outcome: Progressing     Problem: Psychosocial Needs  Goal: Demonstrates ability to  cope with hospitalization/illness  Outcome: Progressing  Goal: Collaborate with me, my family, and caregiver to identify my specific goals  Outcome: Progressing     Problem: Discharge Barriers  Goal: My discharge needs are met  Outcome: Progressing     Problem: Skin  Goal: Decreased wound size/increased tissue granulation at next dressing change  Outcome: Progressing  Goal: Participates in plan/prevention/treatment measures  Outcome: Progressing  Goal: Promote/optimize nutrition  Outcome: Progressing  Goal: Promote skin healing  6/9/2024 1733 by Diana Mcelroy RN  Outcome: Progressing  6/9/2024 1733 by Diana Mcelroy RN  Flowsheets (Taken 6/9/2024 1733)  Promote skin healing: Turn/reposition every 2 hours/use positioning/transfer devices     Problem: Diabetes  Goal: Decrease in ketones present in urine by end of shift  Outcome: Progressing  Goal: Maintain electrolyte levels within acceptable range throughout shift  Outcome: Progressing  Goal: Maintain glucose levels >70mg/dl to <250mg/dl throughout shift  Outcome: Progressing  Goal: No changes in neurological exam by end of shift  Outcome: Progressing  Goal: Learn about and adhere to nutrition recommendations by end of shift  Outcome: Progressing  Goal: Vital signs within normal range for age by end of shift  Outcome: Progressing  Goal: Increase self care and/or family involovement by end of shift  Outcome: Progressing  Goal: Receive DSME education by end of shift  Outcome: Progressing

## 2024-06-10 ENCOUNTER — APPOINTMENT (OUTPATIENT)
Dept: NEUROLOGY | Facility: HOSPITAL | Age: 64
DRG: 602 | End: 2024-06-10
Payer: MEDICARE

## 2024-06-10 ENCOUNTER — OUTSIDE SERVICES (OUTPATIENT)
Dept: INFECTIOUS DISEASES | Age: 64
End: 2024-06-10
Payer: MEDICARE

## 2024-06-10 DIAGNOSIS — N18.9 ACUTE KIDNEY INJURY SUPERIMPOSED ON CKD (HCC): ICD-10-CM

## 2024-06-10 DIAGNOSIS — L03.115 BILATERAL LOWER LEG CELLULITIS: Primary | ICD-10-CM

## 2024-06-10 DIAGNOSIS — N17.9 ACUTE KIDNEY INJURY SUPERIMPOSED ON CKD (HCC): ICD-10-CM

## 2024-06-10 DIAGNOSIS — L03.116 BILATERAL LOWER LEG CELLULITIS: Primary | ICD-10-CM

## 2024-06-10 LAB
GLUCOSE BLD MANUAL STRIP-MCNC: 113 MG/DL (ref 74–99)
GLUCOSE BLD MANUAL STRIP-MCNC: 139 MG/DL (ref 74–99)
GLUCOSE BLD MANUAL STRIP-MCNC: 155 MG/DL (ref 74–99)

## 2024-06-10 PROCEDURE — 99222 1ST HOSP IP/OBS MODERATE 55: CPT | Performed by: REGISTERED NURSE

## 2024-06-10 PROCEDURE — 99223 1ST HOSP IP/OBS HIGH 75: CPT | Performed by: PSYCHIATRY & NEUROLOGY

## 2024-06-10 PROCEDURE — 99232 SBSQ HOSP IP/OBS MODERATE 35: CPT | Performed by: INTERNAL MEDICINE

## 2024-06-10 PROCEDURE — 82947 ASSAY GLUCOSE BLOOD QUANT: CPT | Mod: 91

## 2024-06-10 PROCEDURE — 1210000001 HC SEMI-PRIVATE ROOM DAILY

## 2024-06-10 PROCEDURE — 2500000004 HC RX 250 GENERAL PHARMACY W/ HCPCS (ALT 636 FOR OP/ED)

## 2024-06-10 PROCEDURE — 99232 SBSQ HOSP IP/OBS MODERATE 35: CPT | Performed by: HOSPITALIST

## 2024-06-10 PROCEDURE — 2500000004 HC RX 250 GENERAL PHARMACY W/ HCPCS (ALT 636 FOR OP/ED): Performed by: INTERNAL MEDICINE

## 2024-06-10 PROCEDURE — 2500000004 HC RX 250 GENERAL PHARMACY W/ HCPCS (ALT 636 FOR OP/ED): Performed by: STUDENT IN AN ORGANIZED HEALTH CARE EDUCATION/TRAINING PROGRAM

## 2024-06-10 RX ORDER — HALOPERIDOL 5 MG/ML
5 INJECTION INTRAMUSCULAR ONCE
Status: COMPLETED | OUTPATIENT
Start: 2024-06-10 | End: 2024-06-10

## 2024-06-10 RX ORDER — OLANZAPINE 10 MG/2ML
5 INJECTION, POWDER, FOR SOLUTION INTRAMUSCULAR EVERY 6 HOURS PRN
Status: DISCONTINUED | OUTPATIENT
Start: 2024-06-10 | End: 2024-06-17 | Stop reason: HOSPADM

## 2024-06-10 RX ADMIN — INSULIN LISPRO 2 UNITS: 100 INJECTION, SOLUTION INTRAVENOUS; SUBCUTANEOUS at 14:05

## 2024-06-10 RX ADMIN — LEVETIRACETAM 500 MG: 5 INJECTION INTRAVENOUS at 09:38

## 2024-06-10 RX ADMIN — HALOPERIDOL LACTATE 5 MG: 5 INJECTION, SOLUTION INTRAMUSCULAR at 11:15

## 2024-06-10 RX ADMIN — HYDROCORTISONE SODIUM SUCCINATE 50 MG: 100 INJECTION, POWDER, FOR SOLUTION INTRAMUSCULAR; INTRAVENOUS at 21:05

## 2024-06-10 RX ADMIN — LEVETIRACETAM 500 MG: 5 INJECTION INTRAVENOUS at 21:04

## 2024-06-10 RX ADMIN — CEFTRIAXONE SODIUM 1 G: 1 INJECTION, SOLUTION INTRAVENOUS at 21:06

## 2024-06-10 RX ADMIN — HYDROCORTISONE SODIUM SUCCINATE 100 MG: 100 INJECTION, POWDER, FOR SOLUTION INTRAMUSCULAR; INTRAVENOUS at 06:13

## 2024-06-10 ASSESSMENT — COGNITIVE AND FUNCTIONAL STATUS - GENERAL
TURNING FROM BACK TO SIDE WHILE IN FLAT BAD: A LOT
MOVING TO AND FROM BED TO CHAIR: TOTAL
MOVING FROM LYING ON BACK TO SITTING ON SIDE OF FLAT BED WITH BEDRAILS: A LOT
HELP NEEDED FOR BATHING: TOTAL
DRESSING REGULAR UPPER BODY CLOTHING: TOTAL
CLIMB 3 TO 5 STEPS WITH RAILING: TOTAL
MOBILITY SCORE: 8
DAILY ACTIVITIY SCORE: 6
DRESSING REGULAR LOWER BODY CLOTHING: TOTAL
WALKING IN HOSPITAL ROOM: TOTAL
STANDING UP FROM CHAIR USING ARMS: TOTAL
EATING MEALS: TOTAL
PERSONAL GROOMING: TOTAL
TOILETING: TOTAL

## 2024-06-10 ASSESSMENT — PAIN SCALES - WONG BAKER: WONGBAKER_NUMERICALRESPONSE: NO HURT

## 2024-06-10 ASSESSMENT — PAIN SCALES - GENERAL: PAINLEVEL_OUTOF10: 0 - NO PAIN

## 2024-06-10 ASSESSMENT — PAIN - FUNCTIONAL ASSESSMENT: PAIN_FUNCTIONAL_ASSESSMENT: 0-10

## 2024-06-10 NOTE — NURSING NOTE
"Went to bladder scan patient bc she hasn't voided patient became angry uncooperative all the patient will say is \"stop please stop \" with everything that's done to her. Explained to the patient that she needs to try and urinate. When this nurse asked the patient if she was going to try and go she shook her head no and repeatedly continued to say \"stop please stop\" .  Dr. Bonilla aware. Order to straight cath if patient will allow.  "

## 2024-06-10 NOTE — PROGRESS NOTES
"Bing Holliday is a 63 y.o. female on day 6 of admission presenting with cellulitis        Subjective  Very confused this AM refusing meds    Objective     Last Recorded Vitals  BP (!) 151/97 (BP Location: Right arm, Patient Position: Lying)   Pulse 75   Temp 35.3 °C (95.5 °F) (Temporal)   Resp 20   Ht 1.778 m (5' 10\")   Wt 95.3 kg (210 lb)   LMP  (LMP Unknown)   SpO2 100%   BMI 30.13 kg/m²      Intake/Output last 3 Shifts:    Intake/Output Summary (Last 24 hours) at 6/10/2024 1245  Last data filed at 6/10/2024 0458  Gross per 24 hour   Intake 658.33 ml   Output 0 ml   Net 658.33 ml       Physical Exam   Gen: AOx0  HEENT: EOM, MMM  CV: RRR, no murmurs rubs or gallops  Resp: coarse rhonchi   Abdomen: soft, NT,+BS  LE: No edema      Relevant Results  Lab Results   Component Value Date    WBC 5.2 06/09/2024    HGB 8.2 (L) 06/09/2024    HCT 26.7 (L) 06/09/2024    MCV 98 06/09/2024    PLT 95 (L) 06/09/2024     Lab Results   Component Value Date    GLUCOSE 111 (H) 06/09/2024    CALCIUM 8.6 06/09/2024     06/09/2024    K 4.4 06/09/2024    CO2 27 06/09/2024     06/09/2024    BUN 57 (H) 06/09/2024    CREATININE 2.16 (H) 06/09/2024       Assessment/Plan  63 year old female with history of Lupus, adrenal insuffiencey, lupus,  presented from living facility with cellulitis and worsening confusion     -cellulitis improved, continue rocephin for now    Worsening confusion and pyschosis: neuro and psych consulted  -unclear etiology at this point, possibly lupus related?  -not other signs of infection    Adrenal insufficiency: continue IV steroids for now, endocrine following      JED on CKD stage 3: nephrology following, continue 40 torsemide daily     Hx of PAF: on eliquis which she is not taking due to lack of PO    DVT ppx: SCD for now, very easily agitated and avoiding subcutaneous for now       Kip Felipe MD     "

## 2024-06-10 NOTE — PROGRESS NOTES
Endocrinology Progress Note  Patient: Bing [facility]AG[facility]  Unit/Bed: 1105/1105-A  YOB: 1960  MRN: [id number]  Acct: [id number]   Admitting Diagnosis: Dehydration [E86.0]  Hypomagnesemia [E83.42]  Cellulitis of leg without foot [L03.119]  Acute kidney injury (CMS-HCC) [N17.9]  Open wound of lower extremity, unspecified laterality, initial encounter [S86.455O]  Anemia, unspecified type [D64.9]  Date:  6/4/2024  Hospital Day: 6  Attending: Kip Felipe MD       Complaint:  Chief Complaint   Patient presents with    Labs Only     Pt came by quad from [facility] for abnormal labs (Na 152). CKD st 4.          Assessment     Patient Active Problem List   Diagnosis    COVID-19    Lupus (Multi)    Ambulatory dysfunction    Myelodysplastic syndrome, unspecified (Multi)    Lupus vasculitis (Multi)    Hyperlipidemia    Pneumonia of both lower lobes due to infectious organism    Hallucinations    Leg swelling    Hyperglycemia    JED (acute kidney injury) (CMS-formerly [facility])    Hypernatremia    Proliferative diabetic retinopathy of right eye without macular edema determined by examination associated with type 2 diabetes mellitus (Multi)    Urinary incontinence    Paraparesis (Multi)    Abdominal cramping    Abnormal echocardiogram    Abnormal gait    Abnormal thyroid blood test    Advanced COPD (Multi)    Afferent pupillary defect of right eye    Anemia    Pancytopenia (Multi)    Altitudinal scotoma of right eye    Arcuate scotoma of left eye    Arthropathy    Asymptomatic PVCs    PAF (paroxysmal atrial fibrillation) (Multi)    Balance problem    Bilateral high frequency sensorineural hearing loss    Bradycardia    Branch retinal artery occlusion    Cardiomyopathy (Multi)    Chronic diarrhea    Chronic fatigue    Chronic kidney disease (CKD), stage III (moderate) (Multi)    CKD stage G3a/A2, GFR 45-59 and albumin creatinine ratio  mg/g (Multi)    Combined forms of age-related cataract of left eye    Combined forms of  age-related cataract of right eye    Contracture of hand    Snoring    CVA (cerebral vascular accident) (Multi)    Daytime somnolence    Degeneration of lumbar/lumbosacral disc without myelopathy    Depression, major    Dizziness    Dupuytren's contracture    Eczema    Elevated alkaline phosphatase level    Encephalopathy acute    Facial twitching    Fibromyalgia    Fungal nail infection    GERD (gastroesophageal reflux disease)    Gouty arthropathy    H/O iritis    H/O orthostatic hypotension    Hereditary elliptocytosis (CMS-HCC)    High level of cardiac marker    Hip hematoma, right    Hypothermia    Insomnia    Leg edema, left    Loss of consciousness (Multi)    Low blood sugar reading    Lung nodule, multiple    Lupus nephritis (Multi)    Metabolic encephalopathy    Muscle cramps    Nodule of skin of left lower leg    Obstructive lung disease (Multi)    Osteoarthritis of left hand    Osteoarthritis of right hand    Osteopenia    Osteoporosis    Palpitations    Peripheral visual field defect    Persistent proteinuria    Positive colorectal cancer screening using Cologuard test    Benign essential HTN    Psychosis (Multi)    Pulmonary hypertension (Multi)    Refractive error    Hypertensive retinopathy    Retinal neovascularization    Secondary pulmonary arterial hypertension (Multi)    Shortness of breath on exertion    Spinal stenosis of lumbar region with neurogenic claudication    Subjective tinnitus of both ears    Swelling of right foot    Syncope and collapse    Thrombophlebitis of superficial veins of left lower extremity    Tinnitus of left ear    Vitamin D deficiency    DM type 2 with diabetic peripheral neuropathy (Multi)    Systemic lupus erythematosus (Multi)    Diabetic nephropathy (Multi)    Functional diarrhea    UTI (urinary tract infection)    Moderate protein-calorie malnutrition (Multi)    Shock, unspecified (Multi)    Hyperkalemia    Adrenal insufficiency (Multi)    Seizure-like activity  (Multi)    Meningitis ([facility]-HCC)    Encephalopathy    Seizure (Multi)    Uncontrolled blood glucose    Cervical spondylosis with myelopathy    COPD (chronic obstructive pulmonary disease) (Multi)    Encounter for diabetes education    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)    Chronic kidney disease, stage 4 (severe) (Multi)    Ulcerative (chronic) pancolitis with rectal bleeding (Multi)    Anxiety    Cortical cataract    Hyperparathyroidism due to renal insufficiency (Multi)    Iron deficiency anemia due to chronic blood loss    Left ventricular hypertrophy    Nephrosclerosis    Obesity    Ocular migraine    Persistent cough    Proliferative diabetic retinopathy (Multi)    Raynaud's disease    Disorientation    Bradycardia, unspecified    Cellulitis of leg without foot          Plan:  Adrenal insufficiency-  On hydrocodone 100 g IV every 6-8 hours reduce it to 50 mg IV every 8 hours and continue to watch blood pressure and heart rate        SUBJECTIVE    Patient is stable on the medical floor now  Labs reviewed vitals reviewed      VITALS      Vitals:    06/09/24 2005 06/10/24 0615 06/10/24 0955 06/10/24 1003   BP: 114/69 126/80 (!) 151/97    BP Location: Right arm Right arm Right arm    Patient Position: Lying Lying Lying    Pulse:  82 76 75   Resp: 18 20 20 20   Temp: 35.2 °C (95.4 °F) 35.7 °C (96.3 °F) 35.3 °C (95.5 °F) 35.3 °C (95.5 °F)   TempSrc: Temporal Temporal Temporal Temporal   SpO2: 93%  100%    Weight:       Height:           Intake/Output Summary (Last 24 hours) at 6/10/2024 1900  Last data filed at 6/10/2024 0953  Gross per 24 hour   Intake 758.33 ml   Output --   Net 758.33 ml      Wt Readings from Last 4 Encounters:   06/04/24 95.3 kg (210 lb)   05/13/24 102 kg (224 lb 13.9 oz)   04/24/24 90.9 kg (200 lb 6.4 oz)   03/19/24 96.5 kg (212 lb 11.9 oz)        Allergies:  Allergies   Allergen Reactions    Ace Inhibitors Swelling, Angioedema, Shortness of breath  "and Other     'FACIAL TWISTING\" \"LOOKS LIKE I HAD A STROKE IN MY SLEEP\"    Hydroxychloroquine Other, Nausea And Vomiting, Nausea/vomiting and Unknown     RETINAL BLEEDING    RETINAL BLEEDING      RETINAL BLEEDING, Eye problems    Lisinopril Swelling    Penicillins Unknown     tolerates cephalosporins-unknown childhood allergy    Sulfa (Sulfonamide Antibiotics) Hives        PHYSICAL EXAM   Physical Exam  Deferred    LABS   Magnesium:  Results from last 7 days   Lab Units 06/06/24  0618 06/05/24  0622 06/04/24  1458   MAGNESIUM mg/dL 2.14 1.78 1.37*     Lipid Panel:       Lab Review  Lab Results   Component Value Date    BILITOT 0.3 06/09/2024    CALCIUM 8.6 06/09/2024    CO2 27 06/09/2024     06/09/2024    CREATININE 2.16 (H) 06/09/2024    GLUCOSE 111 (H) 06/09/2024    ALKPHOS 175 (H) 06/09/2024    K 4.4 06/09/2024    PROT 6.5 06/09/2024     06/09/2024    AST 20 06/09/2024    ALT 13 06/09/2024    BUN 57 (H) 06/09/2024    ANIONGAP 18 06/09/2024    MG 2.14 06/06/2024    PHOS 4.7 06/07/2024    GGT 63 (H) 01/07/2021     04/25/2024    ALBUMIN 3.3 (L) 06/09/2024    LIPASE 30 01/24/2023    GFRF 30 (A) 09/01/2023    GFRMALE CANCELED 04/05/2023     Lab Results   Component Value Date    TRIG 79 03/24/2023    CHOL 160 03/24/2023    HDL 70.9 03/24/2023     Lab Results   Component Value Date    HGBA1C 6.8 (H) 06/04/2024    HGBA1C 7.0 (H) 05/31/2024    HGBA1C 8.4 (H) 04/24/2024     The ASCVD Risk score (Stone DK, et al., 2019) failed to calculate for the following reasons:    The patient has a prior MI or stroke diagnosis   Lab Results   Component Value Date    HGBA1C 6.8 (H) 06/04/2024    HGBA1C 7.0 (H) 05/31/2024    HGBA1C 8.4 (H) 04/24/2024     06/09/2024    K 4.4 06/09/2024     06/09/2024    CO2 27 06/09/2024    BUN 57 (H) 06/09/2024    CREATININE 2.16 (H) 06/09/2024    CALCIUM 8.6 06/09/2024    ALBUMIN 3.3 (L) 06/09/2024    PROT 6.5 06/09/2024    BILITOT 0.3 06/09/2024    ALKPHOS 175 (H) " 06/09/2024    ALT 13 06/09/2024    AST 20 06/09/2024    GLUCOSE 111 (H) 06/09/2024    CHOL 160 03/24/2023    TRIG 79 03/24/2023    HDL 70.9 03/24/2023    BHYDRXBUT 0.07 10/23/2023    CPEPTIDE 2.9 04/25/2024    INSAB <0.4 11/21/2023        apixaban, 2.5 mg, oral, BID  atorvastatin, 40 mg, oral, Nightly  cefTRIAXone, 1 g, intravenous, q24h  fludrocortisone, 0.1 mg, oral, Every other day  tiotropium, 2 puff, inhalation, Daily   And  fluticasone furoate-vilanteroL, 1 puff, inhalation, Daily  folic acid, 1 mg, oral, Daily  hydrocortisone sodium succinate, 100 mg, intravenous, q8h  [Held by provider] insulin glargine, 10 Units, subcutaneous, q AM  insulin lispro, 0-10 Units, subcutaneous, TID  levETIRAcetam, 500 mg, intravenous, q12h  melatonin, 5 mg, oral, Nightly  metoprolol tartrate, 25 mg, oral, BID  mycophenolate, 1,000 mg, oral, BID  OLANZapine, 5 mg, oral, Nightly  pantoprazole, 40 mg, oral, Daily  thiamine, 100 mg, oral, Daily  [Held by provider] torsemide, 40 mg, oral, Daily             Electronically signed by Mela Banuelos MD on 6/10/2024 at 7:00 PM

## 2024-06-10 NOTE — CARE PLAN
The patient's goals for the shift include to get back to normal    The clinical goals for the shift include patient will have some oral intake through out shift    Over the shift, the patient did not make progress toward the following goals. Barriers to progression include patient condition. Recommendations to address these barriers include continue encouraging intake.

## 2024-06-10 NOTE — NURSING NOTE
Patient refusing to take PO medication which includes eliquis. Physician today notified and aware that patient not taking.

## 2024-06-10 NOTE — CONSULTS
Chief Complaint  AMS     History Of Present Illness  Bing Holliday is a 63 y.o. year old female patient with past psych history of anxiety depression and  past medical history of adrenocortical insufficiency, generalized weakness, hyperparathyroidism, lupus, diabetes, COPD, protein calorie malnutrition, Raynaud's, iron deficiency anemia, rheumatoid arthritis, chronic kidney disease, pulmonary hypertension, cardiomyopathy, paroxysmal A-fib on Eliquis, gout, fibromyalgia, hypertension, hyperlipidemia, who was sent for abnormal lab workup.  Over course of hospitalization patient has become more confused, refusing oral medications, rarely eating or drinking.  Per report from staff patient often yelling out refusing all care including lab draws, blood pressure, bladder scans.    On assessment today patient only able to state her name.  When asked subsequent questions she responded by saying her name over and over.  My colleague Dr. Cabral received collateral information from patient's son.  He states that patient has experienced psychosis in the past from previous lupus flares.  Has required inpatient psych hospitalization in the past.  Psych encouraged son to pursue legal guardianship of patient so that he would be better positioned to make decisions for her moving forward.     Past Medical History  Past Medical History:   Diagnosis Date    Abnormal kidney function 04/04/2023    Acute headache 03/10/2024    Acute low back pain 10/30/2023    Acute upper respiratory infection, unspecified 03/04/2020    Acute URI    Acute upper respiratory infection, unspecified 09/30/2015    URTI (acute upper respiratory infection)    Arthritis     Atypical facial pain 03/10/2024    Body mass index (BMI) 23.0-23.9, adult 10/15/2021    BMI 23.0-23.9, adult    Body mass index (BMI) 33.0-33.9, adult 03/04/2020    BMI 33.0-33.9,adult    Cardiomegaly 08/27/2013    Left ventricular hypertrophy    Chest pain 06/10/2022    Comment on above: Added  by Problem List Migration; 2013-3-12; Moved to Suppressed Nov 25 2013 9:16PM; Comment on above: Added by Problem List Migration; 2013-3-12; Moved to Suppressed Nov 25 2013 9:16PM;    Chronic kidney disease, stage 3 unspecified ([facility]) 07/02/2013    Chronic kidney disease, stage III (moderate)    Confusional state 03/10/2024    Contact with and (suspected) exposure to covid-19 04/04/2023    Delirium 03/10/2024    Disease of pericardium, unspecified ([facility]) 07/02/2013    Pericardial disease    Encounter for follow-up examination after completed treatment for conditions other than malignant neoplasm 10/06/2022    Hospital discharge follow-up    Generalized contraction of visual field, right eye 01/29/2015    Generalized contraction of visual field of right eye    Hearing loss 03/10/2024    History of cataract 03/10/2024    History of thrombocytopenia 03/10/2024    Comment on above: Added by Problem List Migration; 2013-7-2;    Homonymous bilateral field defects, right side 04/29/2016    Homonymous bilateral field defects of right side    Hypertensive chronic kidney disease with stage 1 through stage 4 chronic kidney disease, or unspecified chronic kidney disease 07/02/2013    Nephrosclerosis    Laceration without foreign body, left foot, initial encounter 07/03/2018    Foot laceration, left, initial encounter    Localized edema 03/10/2024    Mass of skin 03/10/2024    Migraine with aura, not intractable, without status migrainosus 10/24/2022    Ocular migraine    Open wound 03/10/2024    Open wound of left foot 03/10/2024    Other conditions influencing health status 07/02/2013    Chronic Glomerulonephritis In Diseases Classified Elsewhere    Other conditions influencing health status 07/02/2013    Progressive Familial Myoclonic Epilepsy    Other conditions influencing health status 07/02/2013    Protein S Deficiency    Other conditions influencing health status 05/22/2015    Familial Combined Hyperlipidemia    Other  conditions influencing health status 10/24/2022    IDDM (insulin dependent diabetes mellitus)    Other conditions influencing health status 03/14/2022    Diabetes mellitus, insulin dependent (IDDM), uncontrolled    Other long term (current) drug therapy 10/24/2022    Long-term use of Plaquenil    Overweight with body mass index (BMI) 25.0-29.9 03/10/2024    Pain of toe 03/10/2024    Personal history of COVID-19 04/04/2023    Personal history of diseases of the blood and blood-forming organs and certain disorders involving the immune mechanism 07/02/2013    History of thrombocytopenia    Personal history of diseases of the skin and subcutaneous tissue 08/11/2015    History of foot ulcer    Personal history of nephrotic syndrome 07/02/2013    History of nephrotic syndrome    Personal history of other diseases of the circulatory system 08/27/2013    History of sinus tachycardia    Personal history of other diseases of the nervous system and sense organs     History of cataract    Personal history of other diseases of the respiratory system     History of bronchitis    Personal history of other infectious and parasitic diseases 07/02/2013    History of hepatitis    Personal history of other specified conditions     History of shortness of breath    Personal history of other specified conditions 08/27/2013    History of edema    Posterior epistaxis 03/10/2024    Puckering of macula, right eye 10/24/2022    ERM OD (epiretinal membrane, right eye)    Raynaud's syndrome without gangrene 07/02/2013    Raynaud's disease    Sinusitis 03/10/2024    Systemic lupus erythematosus, unspecified ([facility]) 07/24/2015    SLE (systemic lupus erythematosus)    Systemic lupus erythematosus, unspecified ([facility]) 07/24/2015    SLE (systemic lupus erythematosus)    Systemic lupus erythematosus, unspecified ([facility]) 07/24/2015    Systemic lupus    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) 07/02/2013    Type 2 diabetes with  "nephropathy    Type 2 diabetes mellitus with mild nonproliferative diabetic retinopathy without macular edema, left eye ([facility]) 07/27/2015    Non-proliferative diabetic retinopathy, left eye    Type 2 diabetes mellitus with mild nonproliferative diabetic retinopathy without macular edema, unspecified eye (Multi) 07/24/2015    Mild non proliferative diabetic retinopathy    Type 2 diabetes mellitus with proliferative diabetic retinopathy without macular edema, right eye (Multi) 07/27/2015    Proliferative diabetic retinopathy of right eye    Type 2 diabetes mellitus with proliferative diabetic retinopathy without macular edema, unspecified eye (Multi) 07/24/2015    Diabetic proliferative retinopathy    Unspecified acute and subacute iridocyclitis 07/24/2015    Acute iritis, right eye    Unspecified open wound, left foot, sequela 07/03/2018    Wound, open, foot, left, sequela       Past Psychiatric History:   Previous psychiatric treatment and medication trials: yes - Zyprexa  Previous psychiatric hospitalizations: yes  Previous suicide attempts: no  History of violence: no    Allergies  Allergies   Allergen Reactions    Ace Inhibitors Swelling, Angioedema, Shortness of breath and Other     'FACIAL TWISTING\" \"LOOKS LIKE I HAD A STROKE IN MY SLEEP\"    Hydroxychloroquine Other, Nausea And Vomiting, Nausea/vomiting and Unknown     RETINAL BLEEDING    RETINAL BLEEDING      RETINAL BLEEDING, Eye problems    Lisinopril Swelling    Penicillins Unknown     tolerates cephalosporins-unknown childhood allergy    Sulfa (Sulfonamide Antibiotics) Hives        MSE  General: Appropriately groomed and dressed.  Appearance: Appears older than stated age.  Attitude: Calm, cooperative.  Behavior: Poor eye contact.  Motor activity: No agitation or retardation.   Speech: Quiet, mumbling  Mood: Dysphoric  Affect: Restricted  Thought process: Disorganized, illogical   Thought content: Does not endorse suicidal or homicidal ideation,  Thought " perception: Did not endorse auditory or visual hallucinations.  Cognition: Alert, oriented x1.    Insight: Poor.  Judgment: Poor    Psychiatric Risk Assessment  Violence Risk Assessment: major mental illness  Acute Risk of Harm to Others is Considered: low   Suicide Risk Assessment: chronic medical illness and current psychiatric illness  Protective Factors against Suicide: hopefulness/future orientation, positive family relationships, and sense of responsibility toward family  Acute Risk of Harm to Self is Considered: low    Last Recorded Vitals  Vitals:    06/10/24 1003   BP:    Pulse: 75   Resp: 20   Temp: 35.3 °C (95.5 °F)   SpO2:         Relevant Results  Scheduled medications  apixaban, 2.5 mg, oral, BID  atorvastatin, 40 mg, oral, Nightly  cefTRIAXone, 1 g, intravenous, q24h  fludrocortisone, 0.1 mg, oral, Every other day  tiotropium, 2 puff, inhalation, Daily   And  fluticasone furoate-vilanteroL, 1 puff, inhalation, Daily  folic acid, 1 mg, oral, Daily  haloperidol lactate, 5 mg, intravenous, Once  hydrocortisone sodium succinate, 100 mg, intravenous, q8h  [Held by provider] insulin glargine, 10 Units, subcutaneous, q AM  insulin lispro, 0-10 Units, subcutaneous, TID  levETIRAcetam, 500 mg, intravenous, q12h  melatonin, 5 mg, oral, Nightly  metoprolol tartrate, 25 mg, oral, BID  mycophenolate, 1,000 mg, oral, BID  OLANZapine, 5 mg, oral, Nightly  pantoprazole, 40 mg, oral, Daily  thiamine, 100 mg, oral, Daily  [Held by provider] torsemide, 40 mg, oral, Daily      Continuous medications  sodium chloride 0.9%, 50 mL/hr, Last Rate: 50 mL/hr (06/10/24 0458)      PRN medications  PRN medications: acetaminophen **OR** acetaminophen, dextrose, dextrose, glucagon, glucagon, HYDROcodone-acetaminophen, meclizine, morphine, OLANZapine     Results for orders placed or performed during the hospital encounter of 06/04/24 (from the past 96 hour(s))   POCT GLUCOSE   Result Value Ref Range    POCT Glucose 92 74 - 99 mg/dL    POCT GLUCOSE   Result Value Ref Range    POCT Glucose 137 (H) 74 - 99 mg/dL   CBC   Result Value Ref Range    WBC 3.7 (L) 4.4 - 11.3 x10*3/uL    nRBC 0.5 (H) 0.0 - 0.0 /100 WBCs    RBC 2.78 (L) 4.00 - 5.20 x10*6/uL    Hemoglobin 8.5 (L) 12.0 - 16.0 g/dL    Hematocrit 27.9 (L) 36.0 - 46.0 %     80 - 100 fL    MCH 30.6 26.0 - 34.0 pg    MCHC 30.5 (L) 32.0 - 36.0 g/dL    RDW 19.5 (H) 11.5 - 14.5 %    Platelets 85 (L) 150 - 450 x10*3/uL   Basic Metabolic Panel   Result Value Ref Range    Glucose 137 (H) 74 - 99 mg/dL    Sodium 143 136 - 145 mmol/L    Potassium 4.8 3.5 - 5.3 mmol/L    Chloride 106 98 - 107 mmol/L    Bicarbonate 30 21 - 32 mmol/L    Anion Gap 12 10 - 20 mmol/L    Urea Nitrogen 45 (H) 6 - 23 mg/dL    Creatinine 2.15 (H) 0.50 - 1.05 mg/dL    eGFR 25 (L) >60 mL/min/1.73m*2    Calcium 9.0 8.6 - 10.3 mg/dL   SST TOP   Result Value Ref Range    Extra Tube Hold for add-ons.    Renal function panel   Result Value Ref Range    Glucose 134 (H) 74 - 99 mg/dL    Sodium 143 136 - 145 mmol/L    Potassium 4.8 3.5 - 5.3 mmol/L    Chloride 106 98 - 107 mmol/L    Bicarbonate 28 21 - 32 mmol/L    Anion Gap 14 10 - 20 mmol/L    Urea Nitrogen 44 (H) 6 - 23 mg/dL    Creatinine 2.10 (H) 0.50 - 1.05 mg/dL    eGFR 26 (L) >60 mL/min/1.73m*2    Calcium 8.8 8.6 - 10.3 mg/dL    Phosphorus 4.7 2.5 - 4.9 mg/dL    Albumin 2.9 (L) 3.4 - 5.0 g/dL   POCT GLUCOSE   Result Value Ref Range    POCT Glucose 90 74 - 99 mg/dL   POCT GLUCOSE   Result Value Ref Range    POCT Glucose 96 74 - 99 mg/dL   CBC   Result Value Ref Range    WBC 5.1 4.4 - 11.3 x10*3/uL    nRBC 0.0 0.0 - 0.0 /100 WBCs    RBC 2.84 (L) 4.00 - 5.20 x10*6/uL    Hemoglobin 8.5 (L) 12.0 - 16.0 g/dL    Hematocrit 28.4 (L) 36.0 - 46.0 %     80 - 100 fL    MCH 29.9 26.0 - 34.0 pg    MCHC 29.9 (L) 32.0 - 36.0 g/dL    RDW 19.4 (H) 11.5 - 14.5 %    Platelets 93 (L) 150 - 450 x10*3/uL   POCT GLUCOSE   Result Value Ref Range    POCT Glucose 219 (H) 74 - 99 mg/dL    Comprehensive Metabolic Panel   Result Value Ref Range    Glucose 63 (L) 74 - 99 mg/dL    Sodium 145 136 - 145 mmol/L    Potassium 4.3 3.5 - 5.3 mmol/L    Chloride 104 98 - 107 mmol/L    Bicarbonate 33 (H) 21 - 32 mmol/L    Anion Gap 12 10 - 20 mmol/L    Urea Nitrogen 47 (H) 6 - 23 mg/dL    Creatinine 2.28 (H) 0.50 - 1.05 mg/dL    eGFR 24 (L) >60 mL/min/1.73m*2    Calcium 9.0 8.6 - 10.3 mg/dL    Albumin 3.0 (L) 3.4 - 5.0 g/dL    Alkaline Phosphatase 170 (H) 33 - 136 U/L    Total Protein 6.2 (L) 6.4 - 8.2 g/dL    AST 18 9 - 39 U/L    Bilirubin, Total 0.3 0.0 - 1.2 mg/dL    ALT 17 7 - 45 U/L   Lavender Top   Result Value Ref Range    Extra Tube Hold for add-ons.    SST TOP   Result Value Ref Range    Extra Tube Hold for add-ons.    POCT GLUCOSE   Result Value Ref Range    POCT Glucose 52 (L) 74 - 99 mg/dL   POCT GLUCOSE   Result Value Ref Range    POCT Glucose 98 74 - 99 mg/dL   POCT GLUCOSE   Result Value Ref Range    POCT Glucose 122 (H) 74 - 99 mg/dL   POCT GLUCOSE   Result Value Ref Range    POCT Glucose 82 74 - 99 mg/dL   Blood Culture    Specimen: Peripheral Venipuncture; Blood culture   Result Value Ref Range    Blood Culture No growth at 1 day    POCT GLUCOSE   Result Value Ref Range    POCT Glucose 91 74 - 99 mg/dL   POCT GLUCOSE   Result Value Ref Range    POCT Glucose 93 74 - 99 mg/dL   POCT GLUCOSE   Result Value Ref Range    POCT Glucose 79 74 - 99 mg/dL   Comprehensive Metabolic Panel   Result Value Ref Range    Glucose 111 (H) 74 - 99 mg/dL    Sodium 145 136 - 145 mmol/L    Potassium 4.4 3.5 - 5.3 mmol/L    Chloride 104 98 - 107 mmol/L    Bicarbonate 27 21 - 32 mmol/L    Anion Gap 18 10 - 20 mmol/L    Urea Nitrogen 57 (H) 6 - 23 mg/dL    Creatinine 2.16 (H) 0.50 - 1.05 mg/dL    eGFR 25 (L) >60 mL/min/1.73m*2    Calcium 8.6 8.6 - 10.3 mg/dL    Albumin 3.3 (L) 3.4 - 5.0 g/dL    Alkaline Phosphatase 175 (H) 33 - 136 U/L    Total Protein 6.5 6.4 - 8.2 g/dL    AST 20 9 - 39 U/L    Bilirubin, Total 0.3 0.0  - 1.2 mg/dL    ALT 13 7 - 45 U/L   CBC   Result Value Ref Range    WBC 5.2 4.4 - 11.3 x10*3/uL    nRBC 0.4 (H) 0.0 - 0.0 /100 WBCs    RBC 2.72 (L) 4.00 - 5.20 x10*6/uL    Hemoglobin 8.2 (L) 12.0 - 16.0 g/dL    Hematocrit 26.7 (L) 36.0 - 46.0 %    MCV 98 80 - 100 fL    MCH 30.1 26.0 - 34.0 pg    MCHC 30.7 (L) 32.0 - 36.0 g/dL    RDW 19.4 (H) 11.5 - 14.5 %    Platelets 95 (L) 150 - 450 x10*3/uL   POCT GLUCOSE   Result Value Ref Range    POCT Glucose 108 (H) 74 - 99 mg/dL   POCT GLUCOSE   Result Value Ref Range    POCT Glucose 132 (H) 74 - 99 mg/dL   POCT GLUCOSE   Result Value Ref Range    POCT Glucose 120 (H) 74 - 99 mg/dL   POCT GLUCOSE   Result Value Ref Range    POCT Glucose 139 (H) 74 - 99 mg/dL   POCT GLUCOSE   Result Value Ref Range    POCT Glucose 155 (H) 74 - 99 mg/dL         Assessment/Plan   Patient is a 63-year-old female who initially presented to the ED from care facility with a normal lab results.  Reports some hospitalization patient has become more confused.  Currently only alert to self.  Patient does have history of psychosis after lupus flares.  Patient current presentation appears secondary to metabolic encephalopathy as opposed to primary psychiatric disorder.  Psych will follow, will start Zyprexa for acute agitation.    Impression:  Encephalopathy, likely secondary to infection and steroid use.    Recommendations:    -- Medications:          -Zyprexa 5 mg oral Q6 PRN for agitation          --Zyprexa 5 mg IM Q6 PRN for agitation if patient unable to take oral medication       -- Discussed recommendations with primary team.    -- Psychiatry will follow

## 2024-06-10 NOTE — PROGRESS NOTES
Renal Progress Note    Assessment:  63 y.o. female with history s/f T2DM, SLE, COPD, adrenal insufficiency, Raynauds, RA, CKD stage III, depression, pHTN, pA.fib, gout, fibromyalgia, HTN and HLD who presented for abnormal labs.     JED on CKD stage III: ? If in setting of CRS as function improving w/ diuresis, CKD risk factors 2/2 T2DM, HTN, unlikely in setting of SLE, baseline Scr ~1.3-1.4 w/ eGFR low/mid 40s  Fluid overload: improved   Hypomagnesemia   Hypernatremia: corrected   Hypokalemia: corrected      Plan:  - continue torsemide 40 mg PO daily daily clinical and laboratory assessment    Subjective:   Admit Date: 6/4/2024    Interval History: Patient seen and examined uneventful night alert follow command in no apparent pain or distress however does not maintain long-term conversation      Medications:   Scheduled Meds:apixaban, 2.5 mg, oral, BID  atorvastatin, 40 mg, oral, Nightly  cefTRIAXone, 1 g, intravenous, q24h  fludrocortisone, 0.1 mg, oral, Every other day  tiotropium, 2 puff, inhalation, Daily   And  fluticasone furoate-vilanteroL, 1 puff, inhalation, Daily  folic acid, 1 mg, oral, Daily  haloperidol lactate, 5 mg, intravenous, Once  hydrocortisone sodium succinate, 100 mg, intravenous, q8h  [Held by provider] insulin glargine, 10 Units, subcutaneous, q AM  insulin lispro, 0-10 Units, subcutaneous, TID  levETIRAcetam, 500 mg, intravenous, q12h  melatonin, 5 mg, oral, Nightly  metoprolol tartrate, 25 mg, oral, BID  mycophenolate, 1,000 mg, oral, BID  OLANZapine, 5 mg, oral, Nightly  pantoprazole, 40 mg, oral, Daily  thiamine, 100 mg, oral, Daily  [Held by provider] torsemide, 40 mg, oral, Daily      Continuous Infusions:sodium chloride 0.9%, 50 mL/hr, Last Rate: 50 mL/hr (06/10/24 0457)        CBC:   Lab Results   Component Value Date    HGB 8.2 (L) 06/09/2024    HGB 8.5 (L) 06/07/2024    WBC 5.2 06/09/2024    WBC 5.1 06/07/2024    PLT 95 (L) 06/09/2024    PLT 93 (L) 06/07/2024      Anemia:  No  "results found for: \"FERRITIN\", \"IRON\", \"TIBC\"   BMP:    Lab Results   Component Value Date     06/09/2024     06/08/2024    K 4.4 06/09/2024    K 4.3 06/08/2024     06/09/2024     06/08/2024    CO2 27 06/09/2024    CO2 33 (H) 06/08/2024    BUN 57 (H) 06/09/2024    BUN 47 (H) 06/08/2024    CREATININE 2.16 (H) 06/09/2024    CREATININE 2.28 (H) 06/08/2024      Bone disease: No results found for: \"PHOS\", \"PTH\", \"VITD25\"   Urinalysis:  No results found for: \"KRYSTAL\", \"PROTUR\", \"GLUCOSEU\", \"BLOODU\", \"KETONESU\", \"BILIRUBINU\", \"NITRITEU\", \"LEUKOCYTESU\", \"UTPCR\"     Objective:   Vitals: BP (!) 151/97 (BP Location: Right arm, Patient Position: Lying)   Pulse 75   Temp 35.3 °C (95.5 °F) (Temporal)   Resp 20   Ht 1.778 m (5' 10\")   Wt 95.3 kg (210 lb)   LMP  (LMP Unknown)   SpO2 100%   BMI 30.13 kg/m²    Wt Readings from Last 3 Encounters:   06/04/24 95.3 kg (210 lb)   05/13/24 102 kg (224 lb 13.9 oz)   04/24/24 90.9 kg (200 lb 6.4 oz)      24HR INTAKE/OUTPUT:    Intake/Output Summary (Last 24 hours) at 6/10/2024 1333  Last data filed at 6/10/2024 0458  Gross per 24 hour   Intake 658.33 ml   Output 0 ml   Net 658.33 ml     Admission weight:  Weight: 95.3 kg (210 lb)      Constitutional:  Alert, awake, no apparent distress   Skin:normal, no rash  HEENT:sclera anicteric.  Head atraumatic normocephalic  Neck:supple with no thyromegally  Cardiovascular:  S1, S2 without m/r/g   Respiratory:  CTA B without w/r/r   Abdomen: +bs, soft, nt  Ext: no LE edema  Musculoskeletal:Intact  Neuro:Alert and oriented with no deficit      Electronically signed by Sully Gutiérrez MD on 6/10/2024 at 1:33 PM            "

## 2024-06-10 NOTE — CONSULTS
"Inpatient consult to Neurology  Consult performed by: JIM Webb-CNP  Consult ordered by: Neno Rodarte MD          History Of Present Illness  Bing Holliday is a 63 y.o. female presenting with change in MS. Patient is a poor historian, information obtained from nurse. Per nurse, when she received the patient on Friday, she was alert and oriented x 3. Over the weekend she seemed to decline cognitively to where she is not answering questions appropriately, yelling out, refusing care (blood pressures, lab draws, bladder scans) and not taking anything by mouth. She was originally admitted from SNF for abnormal labs and was found to have BLE edema that has opened and weeping. The patient has a history of adrenocortical insufficiency (Florinef), hyperparathyroidism, lupus, diabetes (Lantus 10u daily), rheumatoid arthritis (Cellcept), chronic kidney disease, depression, paroxysmal A-fib (Eliquis), fibromyalgia, hypertension, hyperlipidemia (Atorvastatin), seizure (Keppra). She is currently being followed by Nephrology (Acute on chronic kidney injury), Infectious Disease (cellulitis, ATB mngt), Endocrinology (renal insufficiency), and Psychiatry (anxiety, depression). ED course: /62, HR 73, RR 16, afebrile, O2 sat 94% on room air Labs: Glucose 155, sodium 146, chloride 110, BUNs/creatinine 41/2.24, , magnesium 1.37, , troponin 25 elevated x 1, INR 1.3, hemoglobin 8.1, platelet 92, no leukocytosis CXR: Clear EKG: NSR, LA enlargement, LVH, T wave abnormality.  On exam, she opens eyes to voice, tracks with eyes, is not answering questions appropriately, axox0, yelling out \"wen\" when examined. She has not taken anything orally x 2 days. She is refusing care.   Review of systems deferred.  Past Medical History  Past Medical History:   Diagnosis Date    Abnormal kidney function 04/04/2023    Acute headache 03/10/2024    Acute low back pain 10/30/2023    Acute upper respiratory infection, " unspecified 03/04/2020    Acute URI    Acute upper respiratory infection, unspecified 09/30/2015    URTI (acute upper respiratory infection)    Arthritis     Atypical facial pain 03/10/2024    Body mass index (BMI) 23.0-23.9, adult 10/15/2021    BMI 23.0-23.9, adult    Body mass index (BMI) 33.0-33.9, adult 03/04/2020    BMI 33.0-33.9,adult    Cardiomegaly 08/27/2013    Left ventricular hypertrophy    Chest pain 06/10/2022    Comment on above: Added by Problem List Migration; 2013-3-12; Moved to Suppressed Nov 25 2013 9:16PM; Comment on above: Added by Problem List Migration; 2013-3-12; Moved to Suppressed Nov 25 2013 9:16PM;    Chronic kidney disease, stage 3 unspecified (Multi) 07/02/2013    Chronic kidney disease, stage III (moderate)    Confusional state 03/10/2024    Contact with and (suspected) exposure to covid-19 04/04/2023    Delirium 03/10/2024    Disease of pericardium, unspecified ([facility]) 07/02/2013    Pericardial disease    Encounter for follow-up examination after completed treatment for conditions other than malignant neoplasm 10/06/2022    Hospital discharge follow-up    Generalized contraction of visual field, right eye 01/29/2015    Generalized contraction of visual field of right eye    Hearing loss 03/10/2024    History of cataract 03/10/2024    History of thrombocytopenia 03/10/2024    Comment on above: Added by Problem List Migration; 2013-7-2;    Homonymous bilateral field defects, right side 04/29/2016    Homonymous bilateral field defects of right side    Hypertensive chronic kidney disease with stage 1 through stage 4 chronic kidney disease, or unspecified chronic kidney disease 07/02/2013    Nephrosclerosis    Laceration without foreign body, left foot, initial encounter 07/03/2018    Foot laceration, left, initial encounter    Localized edema 03/10/2024    Mass of skin 03/10/2024    Migraine with aura, not intractable, without status migrainosus 10/24/2022    Ocular migraine    Open  wound 03/10/2024    Open wound of left foot 03/10/2024    Other conditions influencing health status 07/02/2013    Chronic Glomerulonephritis In Diseases Classified Elsewhere    Other conditions influencing health status 07/02/2013    Progressive Familial Myoclonic Epilepsy    Other conditions influencing health status 07/02/2013    Protein S Deficiency    Other conditions influencing health status 05/22/2015    Familial Combined Hyperlipidemia    Other conditions influencing health status 10/24/2022    IDDM (insulin dependent diabetes mellitus)    Other conditions influencing health status 03/14/2022    Diabetes mellitus, insulin dependent (IDDM), uncontrolled    Other long term (current) drug therapy 10/24/2022    Long-term use of Plaquenil    Overweight with body mass index (BMI) 25.0-29.9 03/10/2024    Pain of toe 03/10/2024    Personal history of COVID-19 04/04/2023    Personal history of diseases of the blood and blood-forming organs and certain disorders involving the immune mechanism 07/02/2013    History of thrombocytopenia    Personal history of diseases of the skin and subcutaneous tissue 08/11/2015    History of foot ulcer    Personal history of nephrotic syndrome 07/02/2013    History of nephrotic syndrome    Personal history of other diseases of the circulatory system 08/27/2013    History of sinus tachycardia    Personal history of other diseases of the nervous system and sense organs     History of cataract    Personal history of other diseases of the respiratory system     History of bronchitis    Personal history of other infectious and parasitic diseases 07/02/2013    History of hepatitis    Personal history of other specified conditions     History of shortness of breath    Personal history of other specified conditions 08/27/2013    History of edema    Posterior epistaxis 03/10/2024    Puckering of macula, right eye 10/24/2022    ERM OD (epiretinal membrane, right eye)    Raynaud's syndrome  without gangrene 07/02/2013    Raynaud's disease    Sinusitis 03/10/2024    Systemic lupus erythematosus, unspecified (Multi) 07/24/2015    SLE (systemic lupus erythematosus)    Systemic lupus erythematosus, unspecified (Multi) 07/24/2015    SLE (systemic lupus erythematosus)    Systemic lupus erythematosus, unspecified (Multi) 07/24/2015    Systemic lupus    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) 07/02/2013    Type 2 diabetes with nephropathy    Type 2 diabetes mellitus with mild nonproliferative diabetic retinopathy without macular edema, left eye (Multi) 07/27/2015    Non-proliferative diabetic retinopathy, left eye    Type 2 diabetes mellitus with mild nonproliferative diabetic retinopathy without macular edema, unspecified eye (Multi) 07/24/2015    Mild non proliferative diabetic retinopathy    Type 2 diabetes mellitus with proliferative diabetic retinopathy without macular edema, right eye (Multi) 07/27/2015    Proliferative diabetic retinopathy of right eye    Type 2 diabetes mellitus with proliferative diabetic retinopathy without macular edema, unspecified eye (Multi) 07/24/2015    Diabetic proliferative retinopathy    Unspecified acute and subacute iridocyclitis 07/24/2015    Acute iritis, right eye    Unspecified open wound, left foot, sequela 07/03/2018    Wound, open, foot, left, sequela     Surgical History  Past Surgical History:   Procedure Laterality Date    ANKLE SURGERY  01/29/2015    Ankle Surgery    CARDIAC ELECTROPHYSIOLOGY PROCEDURE Left 5/17/2024    Procedure: PPM IMPLANT DUAL;  Surgeon: Antonio Rodriges MD;  Location: [facility];  Service: Electrophysiology;  Laterality: Left;  Pt. needs platelets and Prednisone prior to procedure    CHOLECYSTECTOMY  01/29/2015    Cholecystectomy    CT GUIDED PERCUTANEOUS BIOPSY BONE DEEP  5/4/2021    CT GUIDED PERCUTANEOUS BIOPSY BONE DEEP 5/4/2021 [facility] CLINICAL LEGACY    EYE SURGERY  03/06/2015    Eye Surgery    FOOT SURGERY   01/29/2015    Foot Surgery    MR HEAD ANGIO WO IV CONTRAST  7/26/2013    MR HEAD ANGIO WO IV CONTRAST 7/26/2013 [facility] CLINICAL LEGACY    MR HEAD ANGIO WO IV CONTRAST  9/17/2021    MR HEAD ANGIO WO IV CONTRAST 9/17/2021 [facility]    MR HEAD ANGIO WO IV CONTRAST  3/25/2023    MR HEAD ANGIO WO IV CONTRAST [facility]    MR NECK ANGIO WO IV CONTRAST  7/26/2013    MR NECK ANGIO WO IV CONTRAST 7/26/2013 [facility] CLINICAL LEGACY    MR NECK ANGIO WO IV CONTRAST  9/17/2021    MR NECK ANGIO WO IV CONTRAST 9/17/2021 [facility]    MR NECK ANGIO WO IV CONTRAST  3/25/2023    MR NECK ANGIO WO IV CONTRAST [facility]    OTHER SURGICAL HISTORY  01/29/2015    Creation Of Pericardial Window    OTHER SURGICAL HISTORY  01/29/2015    Quadricepsplasty    TOTAL HIP ARTHROPLASTY  01/29/2015    Hip Replacement     Social History  Social History     Tobacco Use    Smoking status: Never     Passive exposure: Never    Smokeless tobacco: Never   Vaping Use    Vaping status: Never Used   Substance Use Topics    Alcohol use: Not Currently     Comment: RARE    Drug use: Never     Allergies  Ace inhibitors, Hydroxychloroquine, Lisinopril, Penicillins, and Sulfa (sulfonamide antibiotics)  Medications Prior to Admission   Medication Sig Dispense Refill Last Dose    apixaban (Eliquis) 2.5 mg tablet Take 1 tablet (2.5 mg) by mouth 2 times a day. 60 tablet 1 6/4/2024    atorvastatin (Lipitor) 40 mg tablet Take 1 tablet (40 mg) by mouth once daily. (Patient taking differently: Take 1 tablet (40 mg) by mouth once daily at bedtime.) 90 tablet 3 6/3/2024    fludrocortisone (Florinef) 0.1 mg tablet Take 1 tablet (0.1 mg) by mouth every other day.   6/3/2024    fluticasone-umeclidin-vilanter (TRELEGY-ELLIPTA) 200-62.5-25 mcg blister with device Inhale 1 puff once daily. 60 each 5 6/4/2024    folic acid (Folvite) 1 mg tablet TAKE 1 TABLET BY MOUTH EVERY DAY 90 tablet 1 6/4/2024    furosemide (Lasix) 20 mg tablet Take 2 tablets (40 mg) by mouth once  daily.   2024    guaiFENesin (Mucinex) 600 mg 12 hr tablet Take 2 tablets (1,200 mg) by mouth 2 times a day. Do not crush, chew, or split.   2024    hydrocortisone (Cortef) 10 mg tablet Take 1 tablet (10 mg) by mouth once daily at bedtime.   6/3/2024    hydrocortisone (Cortef) 20 mg tablet Take 1 tablet (20 mg) by mouth once daily.   2024    insulin glargine (Lantus U-100 Insulin) 100 unit/mL injection Inject 10 Units under the skin once daily in the morning. Take as directed per insulin instructions.   2024    insulin lispro (HumaLOG) 100 unit/mL injection Inject under the skin 3 times a day as needed for high blood sugar (before meals). Take as directed per insulin Sliding Scale instructions.  0-150 = 0 units  151-200 = 2 units  201-250 = 4 units  251-300 = 6 units  301-350 = 8 units  351-400 = 10 units  >400 = give 10 units and contact MD   2024 at noon    levETIRAcetam (Keppra) 500 mg tablet Take 1 tablet (500 mg) by mouth every 12 hours. 60 tablet 0 2024    levoFLOXacin (Levaquin) 500 mg tablet Take 1 tablet (500 mg) by mouth once daily. X 10 days   2024    [] magnesium oxide (Mag-Ox) 400 mg (241.3 mg magnesium) tablet Take 2 tablets (800 mg) by mouth once daily. Do not fill before May 1, 2024.   2024    melatonin 5 mg tablet Take 1 tablet (5 mg) by mouth once daily at bedtime.   6/3/2024    metoprolol tartrate (Lopressor) 25 mg tablet Take 1 tablet (25 mg) by mouth 2 times a day.   2024    multivitamin with minerals tablet Take 1 tablet by mouth once daily.   2024    mycophenolate (Cellcept) 500 mg tablet TAKE 2 TABLETS BY MOUTH TWICE A  tablet 0 2024    OLANZapine (ZyPREXA) 7.5 mg tablet Take 1 tablet (7.5 mg) by mouth once daily at bedtime.   2024    pantoprazole (ProtoNix) 40 mg EC tablet TAKE 1 TABLET BY MOUTH EVERY DAY 90 tablet 1 2024    potassium chloride CR 20 mEq ER tablet Take 1 tablet (20 mEq) by mouth once daily. Do not crush or  "chew.   6/4/2024    thiamine 100 mg tablet Take 1 tablet (100 mg) by mouth once daily.   6/4/2024    acetaminophen (Tylenol) 325 mg tablet Take 2 tablets (650 mg) by mouth every 4 hours if needed for mild pain (1 - 3), moderate pain (4 - 6) or fever (temp greater than 38.0 C).   Unknown    albuterol 90 mcg/actuation inhaler Inhale 2 puffs every 6 hours if needed for shortness of breath or wheezing.   Unknown    atovaquone (Mepron) 750 mg/5 mL suspension Take 10 mL (1,500 mg) by mouth once daily.       balsam peru-castor oiL (Venelex) ointment Apply topically 2 times a day. APPLY TO BUTTOCKS, SACRUM, AND THIGHS.   Unknown    blood-glucose sensor (Dexcom G6 Sensor) device Use to check sugars 3 times daily 4 each 2     Dexcom G4 platinum  (Dexcom G6 ) misc Use as instructed 1 each 0     Dexcom G4 platinum transmitter (Dexcom G6 Transmitter) device Use as instructed 1 each 0     glucagon HCL (Glucagon, HCl, Emergency Kit) 1 mg recon soln Inject 1 mg into the muscle if needed.   Unknown    meclizine (Antivert) 25 mg tablet Take 1 tablet (25 mg) by mouth every 12 hours if needed for dizziness.   Unknown    nut.tx.gluc intol,lf,soy/fiber (BOOST GLUCOSE CONTROL ORAL) Take 8 fluid ounces by mouth once daily in the morning.       OLANZapine (ZyPREXA) 5 mg tablet Take 1 tablet (5 mg) by mouth once daily at bedtime.       pen needle, diabetic 31 gauge x 5/16\" needle Use to inject 1-4 times daily as directed. 100 each 11        Review of Systems   Unable to perform ROS: Mental status change     Neurological Exam  Physical Exam  LIMITED NEURO EXAM    MENTAL STATUS    LOC opens eyes to voice, respond to painful stimuli, not answering questions appropriately.    CRANIAL NERVES    CN II-blinked to threat    CN II/III-PERRLA      CN III/IV/VI/VIII-no spontaneous extraoccular movement, no nystagmus, no dysconjugate gaze, no fixed deviation    CN V/VII-corneal reflex present, no facial asymmetry, grimace response " "present    SENSORY AND MOTOR    Spontaneous movement present    Withdrawals from a painful stimulus    REFLEXES    No posturing reflexes    COORDINATION AND GAIT    Untestable in critically ill patient d/t sedation      *Limited Neurological exam was completed d/t patient being critically ill, on sedation, inattentive, or uncooperative, etc.   Last Recorded Vitals  Blood pressure (!) 151/97, pulse 76, temperature 35.7 °C (96.3 °F), temperature source Temporal, resp. rate 20, height 1.778 m (5' 10\"), weight 95.3 kg (210 lb), SpO2 93%.    Relevant Results                    Nasim Coma Scale  Best Eye Response: Spontaneous  Best Verbal Response: Confused  Best Motor Response: Localizes pain  [place] Coma Scale Score: 13                 I have personally reviewed the following imaging results CT head wo IV contrast    Result Date: 6/9/2024  Interpreted By:  Jose A Anderson, STUDY: CT HEAD WO IV CONTRAST;  6/9/2024 4:41 pm   INDICATION: Signs/Symptoms:confusion.   COMPARISON: MRI brain 05/11/2024 and CT brain 05/07/2024   ACCESSION NUMBER(S): [id number]   ORDERING CLINICIAN: MARK AMES   TECHNIQUE: Noncontrast axial CT scan of head was performed.   FINDINGS: Parenchyma: There is no intracranial hemorrhage. The grey-white differentiation is intact. There is no mass effect or midline shift. Patchy supratentorial hypodensity, nonspecific, but likely secondary to mild chronic microvascular ischemia. Stable encephalomalacia left occipital lobe, from prior left PCA distribution infarct.   CSF Spaces: The ventricles, sulci and basal cisterns are within normal limits for age.   Extra-Axial Fluid: There is no extraaxial fluid collection.   Calvarium: The calvarium is unremarkable.   Paranasal sinuses: Visualized paranasal sinuses are clear.   Mastoids: Clear.   Orbits: Normal.   Soft tissues: Unremarkable.       No acute intracranial hemorrhage, mass effect, or CT apparent acute infarct. Mild chronic microvascular " ischemia and stable left occipital lobe encephalomalacia.   Signed by: Jose A Anderson 6/9/2024 4:52 PM Dictation workstation:   [id number]    CT chest abdomen pelvis wo IV contrast    Result Date: 6/9/2024  Interpreted By:  Juliette Perera, STUDY: CT CHEST ABDOMEN PELVIS WO CONTRAST;  6/9/2024 2:00 pm   INDICATION: Signs/Symptoms:? sepsis.   COMPARISON: 04/25/2024   ACCESSION NUMBER(S): [id number]   ORDERING CLINICIAN: MARK AMES   TECHNIQUE: CT of the chest, abdomen, and pelvis was performed.  Contiguous axial images were obtained at 3 mm slice thickness through the chest, abdomen and pelvis. Coronal and sagittal reconstructions at 3 mm slice thickness were performed.   No intravenous contrast.   FINDINGS: Assessment of vascular structures and soft tissues is limited without contrast. Images are mildly degraded by patient motion.   CHEST:   LUNG/PLEURA/LARGE AIRWAYS: Central airways are clear. No pleural effusion or pneumothorax. Allowing for mild motion degraded exam, no nodules are evident. Minimal dependent atelectasis in the lower lungs.   VESSELS: Aortic caliber within normal limits. Dilated main pulmonary artery is approximately 3.6 cm, suggesting pulmonary hypertension. There is severe coronary artery calcification.   HEART: Enlarged heart. Pacing leads present right atrium and right ventricle. No pericardial effusion.   MEDIASTINUM AND DEEPTHI: No mediastinal or hilar lymphadenopathy.  The esophagus appears normal.   CHEST WALL AND LOWER NECK: Scoliosis. Bone demineralization. Multilevel degenerative changes in the spine. Compression deformity of T7 with about 50% height loss, nonacute features. There are severe degenerative changes in both shoulders.  The soft tissues of the chest wall demonstrate no abnormality. There is a left chest wall pacemaker noted. No axillary adenopathy.  The visualized thyroid gland appears within normal limits.   ABDOMEN:   LIVER: Normal size, contour, and density. No evidence  of a focal lesion.   GALLBLADDER: Surgically absent.   BILE DUCTS: No significant biliary dilation allowing for reservoir effect, common duct 9 mm. No radiopaque stone in the bile ducts.   PANCREAS: The pancreas appears within normal limits, no evidence of pancreatitis or mass.   SPLEEN: Normal size.   ADRENAL GLANDS: Within normal limits.   KIDNEYS AND URETERS: Normal size kidneys. No hydronephrosis. Punctate calcification in the right kidney may be a nonobstructing calculus or vascular calcification. There are some small peripherally calcified probable renal artery aneurysms near the hilum, no change. Largest is 6 mm. Small hypodensity of the left upper pole is most likely a cyst, no change. It is approximately 1.1 cm.   PELVIS:   BLADDER: Limited assessment due to streak artifact from right hip arthroplasty. No abnormality is evident.   REPRODUCTIVE ORGANS: Normal-size of the uterus. Ovaries are not well seen. No obvious adnexal mass.   BOWEL: The stomach is unremarkable. The small bowel is normal in caliber without evidence of focal wall thickening or obstruction. Multiple colonic diverticula are present without evidence of diverticulitis. Appendix is normal.   VESSELS: Tortuous aorta with atherosclerotic disease. No aneurysm.   PERITONEUM/RETROPERITONEUM/LYMPH NODES: There is no free or loculated intraperitoneal or retroperitoneal fluid collection, no free intraperitoneal air. No adenopathy.   BONES AND ABDOMINAL WALL: Unchanged mild compression deformity L1, less than 50% height loss. Severe degenerative changes throughout the lumbar spine, most pronounced L2 through L5 levels with S shaped scoliotic curvature. No acute fracture of the spine. There is an age-indeterminate fracture of the anterior right iliac crest appears subacute, although new since 04/25/2024 with small amount of periosteal reaction. No displacement. There is underlying band of sclerosis which is new. A right total hip arthroplasty causes  streak artifacts and mildly limits assessment in the pelvis. There is no other pelvic fracture evident. Severe central canal narrowing at L 3-L4 at L4-5 is unchanged as well as some unchanged severe neural foraminal narrowing bilateral L3-L4 L4-L5 and L5-S1.  severe osteoarthritic change left hip. The abdominal wall soft tissues appear normal.       CHEST: 1.  Findings in the chest to account for sepsis. 2. Cardiomegaly and enlarged caliber main pulmonary artery suggests pulmonary hypertension. Severe coronary artery calcification noted and 2 lead cardiac pacemaker.   ABDOMEN-PELVIS: 1.  No acute process is evident in the abdomen or pelvis to explain sepsis. 2. Colonic diverticulosis. No evidence of diverticulitis. 3. Age-indeterminate but probably subacute nondisplaced fracture of the right iliac crest, new since 04/25/2024. 4. Lumbar scoliosis and multilevel severe degenerative changes in the lumbar spine as well as severe osteoarthritic change in the left hip. Unremarkable visualized portions of right hip arthroplasty. Unchanged L1 compression deformity.     Signed by: Juliette Perera 6/9/2024 3:34 PM Dictation workstation:   [id number]    XR ankle bilateral complete minimum 3 views    Result Date: 6/5/2024  Interpreted By:  Jaziel Montalvo, STUDY: XR ANKLE BILATERAL COMPLETE MINIMUM 3 VIEWS; XR TIBIA FIBULA BILATERAL 2 VIEWS; ;  6/5/2024 8:26 pm   INDICATION: Signs/Symptoms:bilateral lower extremity wounds.   COMPARISON: None.   ACCESSION NUMBER(S): [id number]; [id number]   ORDERING CLINICIAN: LUDWIN MCNEILL   FINDINGS: Osteopenic bone.   RIGHT: There is diffuse edema of the right leg and ankle. No radiopaque foreign bodies or soft tissue gas. There is a rounded centrally lucent calcification of the lateral right thigh likely representing an area of fat necrosis. Scattered phleboliths in the pretibial soft tissues are likely reflective of venous stasis edema. No definite osseous destruction or erosive change.  No acute fracture or malalignment. There is severe osteoarthrosis of the right knee with bone-on-bone contact and exuberant osteophyte formation. There is prominent dorsal midfoot spurring and 5th tarsometatarsal spurring. There is internal fixation hardware with a single screw extending from the 1st metatarsal diaphysis across the Lisfranc joint to the middle cuneiform bone. There is minimal perihardware lucency at the head of the screw.     LEFT: There is diffuse edema of the soft tissues without radiopaque foreign body or gas. There is severe left knee osteoarthrosis with bone-on-bone contact and prominent osteophytosis. There is a metallic cerclage wire extending through the patella and the tibial tuberosity. Limited evaluation of the ankle due to contracture. There is severe hindfoot valgus. There is diffuse ankylosis of the hindfoot and midfoot articulations with abandoned arthrodesis hardware tracts noted. There is advanced tibiotalar osteoarthrosis. There is a remote fracture deformity of the distal fibular and metadiaphysis. No definite osseous destruction or erosive change. No evidence of acute fracture.       1. Diffuse edema of the bilateral legs without radiopaque foreign body or gas. No radiographic evidence of acute osteomyelitis.   2. Severe bilateral knee osteoarthrosis (right > left).   3. Diffuse ankylosis of the left hindfoot and midfoot articulations, severe left tibiotalar osteoarthrosis common severe hindfoot valgus.   Multiple potential etiologies of atraumatic pain as described in detail above, depending on location and clinical context.   MACRO: None.   Signed by: Jaziel Montalvo 6/5/2024 10:15 PM Dictation workstation:   [id number]    XR tibia fibula bilateral 2 views    Result Date: 6/5/2024  Interpreted By:  Jaziel Montalvo, STUDY: XR ANKLE BILATERAL COMPLETE MINIMUM 3 VIEWS; XR TIBIA FIBULA BILATERAL 2 VIEWS; ;  6/5/2024 8:26 pm   INDICATION: Signs/Symptoms:bilateral lower  extremity wounds.   COMPARISON: None.   ACCESSION NUMBER(S): [id number]; [id number]   ORDERING CLINICIAN: LUDWIN MCNEILL   FINDINGS: Osteopenic bone.   RIGHT: There is diffuse edema of the right leg and ankle. No radiopaque foreign bodies or soft tissue gas. There is a rounded centrally lucent calcification of the lateral right thigh likely representing an area of fat necrosis. Scattered phleboliths in the pretibial soft tissues are likely reflective of venous stasis edema. No definite osseous destruction or erosive change. No acute fracture or malalignment. There is severe osteoarthrosis of the right knee with bone-on-bone contact and exuberant osteophyte formation. There is prominent dorsal midfoot spurring and 5th tarsometatarsal spurring. There is internal fixation hardware with a single screw extending from the 1st metatarsal diaphysis across the Lisfranc joint to the middle cuneiform bone. There is minimal perihardware lucency at the head of the screw.     LEFT: There is diffuse edema of the soft tissues without radiopaque foreign body or gas. There is severe left knee osteoarthrosis with bone-on-bone contact and prominent osteophytosis. There is a metallic cerclage wire extending through the patella and the tibial tuberosity. Limited evaluation of the ankle due to contracture. There is severe hindfoot valgus. There is diffuse ankylosis of the hindfoot and midfoot articulations with abandoned arthrodesis hardware tracts noted. There is advanced tibiotalar osteoarthrosis. There is a remote fracture deformity of the distal fibular and metadiaphysis. No definite osseous destruction or erosive change. No evidence of acute fracture.       1. Diffuse edema of the bilateral legs without radiopaque foreign body or gas. No radiographic evidence of acute osteomyelitis.   2. Severe bilateral knee osteoarthrosis (right > left).   3. Diffuse ankylosis of the left hindfoot and midfoot articulations, severe left  tibiotalar osteoarthrosis common severe hindfoot valgus.   Multiple potential etiologies of atraumatic pain as described in detail above, depending on location and clinical context.   MACRO: None.   Signed by: Jaziel Montalvo 6/5/2024 10:15 PM Dictation workstation:   [id number]    ECG 12 lead    Result Date: 6/4/2024  Normal sinus rhythm Possible Left atrial enlargement Left ventricular hypertrophy T wave abnormality, consider inferior ischemia Abnormal ECG When compared with ECG of 18-MAY-2024 06:51, Sinus rhythm has replaced Electronic atrial pacemaker Nonspecific T wave abnormality, worse in Anterolateral leads See ED provider note for full interpretation and clinical correlation    XR chest 1 view    Result Date: 6/4/2024  Interpreted By:  Nic Moseley, STUDY: XR CHEST 1 VIEW  6/4/2024 2:24 pm   INDICATION: Signs/Symptoms:peripheral edema, weakness   COMPARISON: 05/17/2024   ACCESSION NUMBER(S): [id number]   ORDERING CLINICIAN: CHAVO KIM   TECHNIQUE: A single AP portable radiograph of the chest was obtained.   FINDINGS: Multiple cardiac monitoring leads are seen over the chest.  A 2 lead pacer is seen over the left chest. No focal infiltrate, pleural effusion or pneumothorax is identified. The cardiac silhouette is mildly prominent, similar to prior studies.       No focal infiltrate or pneumothorax is identified.   MACRO: None.   Signed by: Nic Moseley 6/4/2024 2:39 PM Dictation workstation:   [id number]    ECG 12 lead STAT    Result Date: 5/18/2024  Atrial-paced rhythm Minimal voltage criteria for LVH, may be normal variant Abnormal ECG When compared with ECG of 16-MAY-2024 06:58, (unconfirmed) Electronic atrial pacemaker has replaced Sinus rhythm Confirmed by Antonio Rodriges ([id number]) on 5/18/2024 11:28:44 AM    ECG 12 Lead    Result Date: 5/18/2024  Atrial-paced rhythm Voltage criteria for left ventricular hypertrophy Abnormal ECG When compared with ECG of 17-MAY-2024 08:03, (unconfirmed) No  significant change was found Confirmed by Antonio Rodriges ([id number]) on 5/18/2024 11:28:48 AM    ECG 12 Lead    Result Date: 5/18/2024  Sinus bradycardia Voltage criteria for left ventricular hypertrophy Nonspecific T wave abnormality Abnormal ECG When compared with ECG of 15-MAY-2024 21:38, (unconfirmed) ST more depressed in Inferior leads Confirmed by Antonio Rodriges ([id number]) on 5/18/2024 11:28:37 AM    ECG 12 Lead    Result Date: 5/18/2024  Marked sinus bradycardia Voltage criteria for left ventricular hypertrophy Abnormal ECG When compared with ECG of 15-MAY-2024 21:39, (unconfirmed) ST no longer depressed in Anterior leads T wave inversion no longer evident in Anterolateral leads Confirmed by Antonio Rdoriges ([id number]) on 5/18/2024 11:28:41 AM    XR chest 1 view    Result Date: 5/17/2024  Interpreted By:  Hay Tucker, STUDY: XR CHEST 1 VIEW;  5/17/2024 8:04 am   INDICATION: Signs/Symptoms:post implant.   COMPARISON: 05/10/2024.   ACCESSION NUMBER(S): [id number]   ORDERING CLINICIAN: LEIGHA DUGGAN   FINDINGS: CARDIOMEDIASTINAL SILHOUETTE: Patient is rotated. There has been interval placement of a left-sided dual lead cardiac pacing device. Lead tips overlie the right atrium and right ventricle regions respectively. Cardiomegaly and aortic calcifications are similar to prior.   LUNGS: Inspiratory volume is low. Interstitial prominence is present with irregular infiltrates in the infrahilar regions and lung bases. Small pleural effusions not excluded. No appreciable pneumothorax.   ABDOMEN: No remarkable upper abdominal findings.   BONES: No acute osseous changes.       1.  Interval placement of a left-sided dual lead cardiac pacing device with leads overlying the right atrium and right ventricle levels respectively. No pneumothorax. 2. Low inspiratory volume. Interstitial prominence with irregular infiltrates in the infrahilar regions in the lung bases may be due to vascular congestion/edema. Infection not excluded.        MACRO: None.   Signed by: Hay Tucker 5/17/2024 8:18 AM Dictation workstation:   [id number]    Electrophysiology procedure    Result Date: 5/17/2024  Table formatting from the original result was not included. Procedure Details:  Permanent Pacemaker System Implantation Dual Chamber Summary: ·   Successful implantation of a dual-chamber permanent pacemaker. ·   The pacing and sensing thresholds were satisfactory. Recommendations: 1. A chest X-ray should be performed and telemetry monitoring continued for 24 hours. 2. A 12 lead ECG should be performed prior to discharge from the hospital. 3. The patient should continue with the present medications. Discharge: 1. The patient recovered uneventfully from the effects of conscious sedation. The patient left the EP laboratory in stable condition and was transferred to the telemetry unit . Follow up: 1. The patient will return to telemetry if recovery parameters are appropriate.  The patient should be alert for bleeding, swelling, or signs of infection. The patient should call the electrophysiologist immediately if symptoms recur, or for any problems. The patient has been instructed accordingly. 2. Follow up with [facility] office in seven days for post-operative wound assessment. 3. Follow up with [facility] in twelve weeks for routine device analysis and reprogramming if necessary. Remote monitoring will be instituted if possible. ________________________________________ Procedures: Pocket fashioned. Dual-chamber permanent pacemaker implantation. Device testing. ________________________________________ Patient history: Please refer to the detailed history and physical on the patient's medical chart.  History of significant bradycardia, atrial fibrillation.  Coronary artery disease.  Patient is a scheduled for dual-chamber pacemaker implantation ________________________________________ Procedure narrative: The risks, benefits, and alternatives to the procedure and  sedation were explained to the patient, and informed consent was obtained. The patient was in the fasting state. A grounding pad was placed. Self-adhesive anterior-posterior defibrillation pads were applied. A ZOLL defibrillator was used for monitoring and the defibrillator waveform was set to biphasic. The patient was set up for continuous monitoring of surface 12 lead ECG and pulse oximetry. Blood pressure was monitored. The procedure was performed under IV conscious sedation. The upper chest was prepped and draped in the usual sterile fashion. Local anesthesia: After preoperative IV antibiotic was completely infused, subcutaneous tissues just medial to the left deltopectoral area, were infiltrated with Lidocaine 1 % for local anesthesia. 1. Using a #15 scalpel, an incision was made, which was extended to the prepectoral fascia using blunt dissection.  The left cephalic vein was identified.  Using Jarrell's.  Venotomy was created.  The distal part was tied off.  The left cephalic vein was small and unable to accommodate the wire.  The proximal part was tied off again. 2. The  left subclavian vein was accessed using the Seldinger technique. 3. A guidewire was advanced to the heart under fluoroscopic guidance, a sheath was placed over the guidewire, and the guidewire was retained and the dilator was removed. A lead was then advanced to the heart via fluoroscopic guidance, and the sheath was peeled away. The ventricle was mapped, and the lead was fixed to the right ventricular low septum. After lead placement, appropriate sensing and thresholds were obtained.  No diaphragmatic pacing occurred at 10V and 1.5ms.  A second sheath was placed over the wire and the dilator were  removed. A pacemaker lead was advanced to the heart under fluoroscopy guidance and the sheath was peeled away. The lead was placed in the right atrial appendage. After lead placement, appropriate sensing and threshols were obtained. No diaphragmatic  pacing occurred at 10V and 1.5ms. 4. The lead was sutured in place to the pectoralis muscle using O-Ethibond suture. Hemostasis was obtained with bovie cautery, one purse string sutures of O-Ethibond, and Vilma. Lead measurements were reevaluated. 5. A left prepectoral pocket was fashioned. 6. A dual-chamber permanent pacemaker was attached to the lead and implanted. 7. The device was interrogated and its parameters recorded; telemetered electrograms and pacing and sensing thresholds were measured. 8. The pocket was flushed with saline and vancomycin. Wound hemostasis was obtained with electrocautery. The wound was closed in three layers. The skin was approximated with subcuticular suture and steri-strips. A pressure dressing was applied. The patient was transferred to the telemetry unit. ________________________________________ Complications: The patient tolerated the procedure without any complications or incident. ________________________________________ Prepared and signed by Tolerance: good Complications: None Estimated blood loss 10 cc Device implantation Medtronic [place] XT DR MRI model number W1  number [id number].  Implanted May 17, 2024. Right atrial lead. Medtronic 5076/45.  Send number [id number].  Implanted May 17, 2024.  Location right and appendage.  Capture 1.4 at 0.4 ms.  Impedance 760 ohms.  EGM amplitude 1.7 mV.  Diaphragmatic stimulation none.  Status active. Right ventricular lead Medtronic 507 6-52.  Serial number PJ and AR is [id number].  Implanted May 17, 2024.  Location right ventricular low septum.  Capture 0.8 V at 0.4 ms.  Impedance 513 ohms.  EGM amplitude 10.0 mV.  Diaphragmatic stimulation none.  Status active.     ECG 12 Lead    Result Date: 5/15/2024  Sinus bradycardia with Premature atrial complexes Voltage criteria for left ventricular hypertrophy Abnormal ECG When compared with ECG of 14-MAY-2024 07:04, (unconfirmed) Premature atrial complexes are now Present Confirmed by  Michael Arenas ([id number]) on 5/15/2024 10:46:15 AM    ECG 12 Lead    Result Date: 5/15/2024  Sinus bradycardia Possible Left atrial enlargement Left ventricular hypertrophy Nonspecific ST abnormality Abnormal ECG When compared with ECG of 13-MAY-2024 06:33, (unconfirmed) No significant change was found Confirmed by Michael Arenas ([id number]) on 5/15/2024 10:37:55 AM    ECG 12 Lead    Result Date: 5/15/2024  Sinus bradycardia Voltage criteria for left ventricular hypertrophy Nonspecific ST changes Abnormal ECG When compared with ECG of 12-MAY-2024 06:38, Sinus rhythm has replaced Atrial fibrillation Confirmed by Michael Arenas ([id number]) on 5/15/2024 10:26:42 AM    Bedside Midline Imaging    Result Date: 5/13/2024  These images are not reportable by radiology and will not be interpreted by  Radiologists.    ECG 12 Lead    Result Date: 5/12/2024  Atrial fibrillation Voltage criteria for left ventricular hypertrophy ST & T wave abnormality, consider inferior ischemia Abnormal ECG When compared with ECG of 12-MAY-2024 01:59, (unconfirmed) No significant change was found Confirmed by Antonio Rodriges ([id number]) on 5/12/2024 10:06:57 AM    ECG 12 Lead    Result Date: 5/12/2024  Atrial fibrillation Moderate voltage criteria for LVH, may be normal variant Nonspecific ST and T wave abnormality Abnormal ECG When compared with ECG of 11-MAY-2024 07:08, Atrial fibrillation has replaced Sinus rhythm Non-specific change in ST segment in Lateral leads Confirmed by Antonio Rodriges ([id number]) on 5/12/2024 10:06:48 AM    MR brain w and wo IV contrast    Result Date: 5/11/2024  Interpreted By:  Emelia George, STUDY: MR BRAIN W AND WO IV CONTRAST; 5/11/2024 5:51 pm   INDICATION: Signs/Symptoms:seizure, change in MS.   COMPARISON: CT head from 05/07/2024   ACCESSION NUMBER(S): [id number]   ORDERING CLINICIAN: VINCENT LOPEZ   TECHNIQUE: Axial T2, FLAIR, DWI, gradient echo T2 and T1 weighted images of brain were acquired. Post contrast T1 weighted  images were acquired after administration of  gadolinium based intravenous contrast.   FINDINGS: There is no diffusion restriction abnormality to suggest acute infarct.   There is an area of encephalomalacia within the left occipital lobe with patchy hemosiderin deposition.   There are patchy areas of T2 and FLAIR hyperintense signal within bilateral periventricular and subcortical white matter which likely reflect sequela of chronic small vessel ischemic change. Another small area of cortical encephalomalacia is noted within the parasagittal posterior right frontal lobe as well as within the left parietal lobe.   Minimal nonspecific white matter changes are noted within the ryan. 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   Postcontrast imaging demonstrates no focus of abnormal parenchymal or leptomeningeal enhancement.   Visualized paranasal sinuses are clear. Mild opacification of bilateral mastoid air cells is noted.       No evidence of acute infarct, intracranial mass effect or midline shift. Multiple chronic findings as detailed.   Signed by: Emelia George 5/11/2024 6:44 PM Dictation workstation:   [id number]  .      Assessment/Plan   Principal Problem:    Cellulitis of leg without foot      Impression:  Encephalopathy w/psychosis in the setting of cefepime and steroid use (ID changed to Rocephin) vs breakthrough seizure (unlikely, but will get rEEG)   Current cellulitis  History of SLE  History of anxiety and depression  History of renal insufficiency     Recommendations:   If able-consider decreasing steroid to help prevent psychosis  Haldol 5mg IV x 1 dose  rEEG  Agree with psych consult-no evidence of CNS infection, no focal neurological deficits noted, MRI brain in May showed no acute process  May consider palliative consult if patient refusing to eat, take medications                 JIM Webb-CNP      Patient seen and examined.  I did review patient's past  record including the MRI Scan on the labs done in the past with extensive medical history.  I agree with the plan and I believe most likely needs due to underlying medical conditions.  Continue with Haldol and if patient response to Haldol may use it every 6-8 hours as needed till the patient psychosis resolved.  Continue with Zyprexa which patient is taking at bedside and appreciate psych consult and will follow with you    Prognosis is guarded.    Due to technical limitations of voice recognition and human error, this note may not accurately reflect the care of the patient.    Mumtaz covarrubias Md/neurology.

## 2024-06-10 NOTE — CARE PLAN
Pt. Is refusing rEEG, screaming, turning head and not allowing EEG tech to place electrodes. Breakthrough seizure is low on the DD. May attempt once again when the patient is less agitated.

## 2024-06-10 NOTE — CARE PLAN
The patient's goals for the shift include to get back to normal    The clinical goals for the shift include take medications    Over the shift, the patient did not make progress toward the following goals. Barriers to progression include . Recommendations to address these barriers include .

## 2024-06-10 NOTE — PROGRESS NOTES
Infectious Diseases Inpatient Progress Note    HISTORY OF PRESENT ILLNESS:  Follow up new onset altered mentation that started after admission, could be related to IV cefepime, currently switched to IV Rocephin in a patient with recent pacemaker, bilateral legs extensive ulcerations, history of mixed connective tissue disease, fibromyalgia and depression, currently being evaluated by psychiatry and neurology,  on Haldol as needed  Patient is refusing to eat or take any medications by mouth  Opens eyes to verbal stimulation and answers with 1 or 2 word sentences.   screams in pain when touched anywhere  Current Medications:    apixaban, 2.5 mg, oral, BID  atorvastatin, 40 mg, oral, Nightly  cefTRIAXone, 1 g, intravenous, q24h  fludrocortisone, 0.1 mg, oral, Every other day  tiotropium, 2 puff, inhalation, Daily   And  fluticasone furoate-vilanteroL, 1 puff, inhalation, Daily  folic acid, 1 mg, oral, Daily  haloperidol lactate, 5 mg, intravenous, Once  hydrocortisone sodium succinate, 100 mg, intravenous, q8h  [Held by provider] insulin glargine, 10 Units, subcutaneous, q AM  insulin lispro, 0-10 Units, subcutaneous, TID  levETIRAcetam, 500 mg, intravenous, q12h  melatonin, 5 mg, oral, Nightly  metoprolol tartrate, 25 mg, oral, BID  mycophenolate, 1,000 mg, oral, BID  OLANZapine, 5 mg, oral, Nightly  pantoprazole, 40 mg, oral, Daily  thiamine, 100 mg, oral, Daily  [Held by provider] torsemide, 40 mg, oral, Daily        Allergies:  Ace inhibitors, Hydroxychloroquine, Lisinopril, Penicillins, and Sulfa (sulfonamide antibiotics)      Review of Systems  unable to provide ROS because of altered mentation      Physical Exam    Heart Rate:  [59-82]   Temp:  [35 °C (95 °F)-35.7 °C (96.3 °F)]   Resp:  [16-20]   BP: (114-151)/()   SpO2:  [92 %-100 %]    Vitals:    06/09/24 2005 06/10/24 0615 06/10/24 0955 06/10/24 1003   BP: 114/69 126/80 (!) 151/97    BP Location: Right arm Right arm Right arm    Patient Position: Lying  Lying Lying    Pulse:  82 76 75   Resp: 18 20 20 20   Temp: 35.2 °C (95.4 °F) 35.7 °C (96.3 °F) 35.3 °C (95.5 °F) 35.3 °C (95.5 °F)   TempSrc: Temporal Temporal Temporal Temporal   SpO2: 93%  100%    Weight:       Height:         General Appearance: alert and oriented to person, place and time, well-developed and well-nourished, in no acute distress  Skin: warm and dry, no rash.   Head: normocephalic and atraumatic  Eyes: anicteric sclerae  ENT:  normal mucous membranes. No oral thrush  Lungs: normal respiratory effort, clear lungs  Mild swelling at left chest pacemaker site without any drainage or erythema.   Positive generalized tenderness  Heart normal S1-S2 no murmur  Abdomen: soft, no distention or rigidity  No leg edema  Bilateral legs with intact dressing  Positive clear urine in pure wick  DATA:    Lab Results   Component Value Date    WBC 5.2 06/09/2024    HGB 8.2 (L) 06/09/2024    HCT 26.7 (L) 06/09/2024    MCV 98 06/09/2024    PLT 95 (L) 06/09/2024     Lab Results   Component Value Date    CREATININE 2.16 (H) 06/09/2024    BUN 57 (H) 06/09/2024     06/09/2024    K 4.4 06/09/2024     06/09/2024    CO2 27 06/09/2024       Hepatic Function Panel:  Lab Results   Component Value Date    ALKPHOS 175 (H) 06/09/2024    ALT 13 06/09/2024    AST 20 06/09/2024    PROT 6.5 06/09/2024    BILITOT 0.3 06/09/2024       Microbiology:   Susceptibility data from last 90 days.  Collected Specimen Info Organism Ampicillin Ciprofloxacin Daptomycin Levofloxacin Linezolid Nitrofurantoin Penicillin Trimethoprim/Sulfamethoxazole Vancomycin   04/26/24 Urine from Clean Catch/Voided Enterococcus faecium R R SDD R S S R R R        Imaging:   XR ankle bilateral complete minimum 3 views    Result Date: 6/5/2024  Interpreted By:  Jaziel Montalvo, STUDY: XR ANKLE BILATERAL COMPLETE MINIMUM 3 VIEWS; XR TIBIA FIBULA BILATERAL 2 VIEWS; ;  6/5/2024 8:26 pm   INDICATION: Signs/Symptoms:bilateral lower extremity wounds.    COMPARISON: None.   ACCESSION NUMBER(S): [id number]; [id number]   ORDERING CLINICIAN: LUDWIN MCNEILL   FINDINGS: Osteopenic bone.   RIGHT: There is diffuse edema of the right leg and ankle. No radiopaque foreign bodies or soft tissue gas. There is a rounded centrally lucent calcification of the lateral right thigh likely representing an area of fat necrosis. Scattered phleboliths in the pretibial soft tissues are likely reflective of venous stasis edema. No definite osseous destruction or erosive change. No acute fracture or malalignment. There is severe osteoarthrosis of the right knee with bone-on-bone contact and exuberant osteophyte formation. There is prominent dorsal midfoot spurring and 5th tarsometatarsal spurring. There is internal fixation hardware with a single screw extending from the 1st metatarsal diaphysis across the Lisfranc joint to the middle cuneiform bone. There is minimal perihardware lucency at the head of the screw.     LEFT: There is diffuse edema of the soft tissues without radiopaque foreign body or gas. There is severe left knee osteoarthrosis with bone-on-bone contact and prominent osteophytosis. There is a metallic cerclage wire extending through the patella and the tibial tuberosity. Limited evaluation of the ankle due to contracture. There is severe hindfoot valgus. There is diffuse ankylosis of the hindfoot and midfoot articulations with abandoned arthrodesis hardware tracts noted. There is advanced tibiotalar osteoarthrosis. There is a remote fracture deformity of the distal fibular and metadiaphysis. No definite osseous destruction or erosive change. No evidence of acute fracture.       1. Diffuse edema of the bilateral legs without radiopaque foreign body or gas. No radiographic evidence of acute osteomyelitis.   2. Severe bilateral knee osteoarthrosis (right > left).   3. Diffuse ankylosis of the left hindfoot and midfoot articulations, severe left tibiotalar osteoarthrosis  common severe hindfoot valgus.   Multiple potential etiologies of atraumatic pain as described in detail above, depending on location and clinical context.   MACRO: None.   Signed by: Jaziel Montalvo 6/5/2024 10:15 PM Dictation workstation:   [id number]    XR tibia fibula bilateral 2 views    Result Date: 6/5/2024  Interpreted By:  Jaziel Montalvo, STUDY: XR ANKLE BILATERAL COMPLETE MINIMUM 3 VIEWS; XR TIBIA FIBULA BILATERAL 2 VIEWS; ;  6/5/2024 8:26 pm   INDICATION: Signs/Symptoms:bilateral lower extremity wounds.   COMPARISON: None.   ACCESSION NUMBER(S): [id number]; [id number]   ORDERING CLINICIAN: LUDWIN MCNEILL   FINDINGS: Osteopenic bone.   RIGHT: There is diffuse edema of the right leg and ankle. No radiopaque foreign bodies or soft tissue gas. There is a rounded centrally lucent calcification of the lateral right thigh likely representing an area of fat necrosis. Scattered phleboliths in the pretibial soft tissues are likely reflective of venous stasis edema. No definite osseous destruction or erosive change. No acute fracture or malalignment. There is severe osteoarthrosis of the right knee with bone-on-bone contact and exuberant osteophyte formation. There is prominent dorsal midfoot spurring and 5th tarsometatarsal spurring. There is internal fixation hardware with a single screw extending from the 1st metatarsal diaphysis across the Lisfranc joint to the middle cuneiform bone. There is minimal perihardware lucency at the head of the screw.     LEFT: There is diffuse edema of the soft tissues without radiopaque foreign body or gas. There is severe left knee osteoarthrosis with bone-on-bone contact and prominent osteophytosis. There is a metallic cerclage wire extending through the patella and the tibial tuberosity. Limited evaluation of the ankle due to contracture. There is severe hindfoot valgus. There is diffuse ankylosis of the hindfoot and midfoot articulations with abandoned arthrodesis  hardware tracts noted. There is advanced tibiotalar osteoarthrosis. There is a remote fracture deformity of the distal fibular and metadiaphysis. No definite osseous destruction or erosive change. No evidence of acute fracture.       1. Diffuse edema of the bilateral legs without radiopaque foreign body or gas. No radiographic evidence of acute osteomyelitis.   2. Severe bilateral knee osteoarthrosis (right > left).   3. Diffuse ankylosis of the left hindfoot and midfoot articulations, severe left tibiotalar osteoarthrosis common severe hindfoot valgus.   Multiple potential etiologies of atraumatic pain as described in detail above, depending on location and clinical context.   MACRO: None.   Signed by: Jaziel Montalvo 6/5/2024 10:15 PM Dictation workstation:   [id number]    ECG 12 lead    Result Date: 6/4/2024  Normal sinus rhythm Possible Left atrial enlargement Left ventricular hypertrophy T wave abnormality, consider inferior ischemia Abnormal ECG When compared with ECG of 18-MAY-2024 06:51, Sinus rhythm has replaced Electronic atrial pacemaker Nonspecific T wave abnormality, worse in Anterolateral leads See ED provider note for full interpretation and clinical correlation    XR chest 1 view    Result Date: 6/4/2024  Interpreted By:  Nic Moseley, STUDY: XR CHEST 1 VIEW  6/4/2024 2:24 pm   INDICATION: Signs/Symptoms:peripheral edema, weakness   COMPARISON: 05/17/2024   ACCESSION NUMBER(S): [id number]   ORDERING CLINICIAN: CHAVO KIM   TECHNIQUE: A single AP portable radiograph of the chest was obtained.   FINDINGS: Multiple cardiac monitoring leads are seen over the chest.  A 2 lead pacer is seen over the left chest. No focal infiltrate, pleural effusion or pneumothorax is identified. The cardiac silhouette is mildly prominent, similar to prior studies.       No focal infiltrate or pneumothorax is identified.   MACRO: None.   Signed by: Nic Moseley 6/4/2024 2:39 PM Dictation workstation:    [id number]         IMPRESSION:    Altered mentation with restlessness and confusion, possible side effects from cefepime  Bilateral lower extremity cellulitis, unroofed blisters secondary to edema, improving  Generalized pain, could be related to underlying rheumatoid arthritis and fibromyalgia/depression  Acute kidney injury on chronic kidney disease    PLAN:  Continue IV Rocephin  Follow-up clinically  Follow-up blood cultures obtained 2 days ago that are negative so far  Follow-up with psychiatry and neurology    Discussed with Wisam Vicente MD

## 2024-06-11 ENCOUNTER — OUTSIDE SERVICES (OUTPATIENT)
Dept: INFECTIOUS DISEASES | Age: 64
End: 2024-06-11
Payer: MEDICARE

## 2024-06-11 VITALS
RESPIRATION RATE: 20 BRPM | SYSTOLIC BLOOD PRESSURE: 145 MMHG | WEIGHT: 210 LBS | OXYGEN SATURATION: 99 % | TEMPERATURE: 96.3 F | HEIGHT: 70 IN | BODY MASS INDEX: 30.06 KG/M2 | HEART RATE: 60 BPM | DIASTOLIC BLOOD PRESSURE: 84 MMHG

## 2024-06-11 DIAGNOSIS — L03.115 BILATERAL LOWER LEG CELLULITIS: Primary | ICD-10-CM

## 2024-06-11 DIAGNOSIS — N18.9 ACUTE KIDNEY INJURY SUPERIMPOSED ON CHRONIC KIDNEY DISEASE (HCC): ICD-10-CM

## 2024-06-11 DIAGNOSIS — L03.116 BILATERAL LOWER LEG CELLULITIS: Primary | ICD-10-CM

## 2024-06-11 DIAGNOSIS — N17.9 ACUTE KIDNEY INJURY SUPERIMPOSED ON CHRONIC KIDNEY DISEASE (HCC): ICD-10-CM

## 2024-06-11 LAB
ALBUMIN SERPL BCP-MCNC: 3 G/DL (ref 3.4–5)
ALP SERPL-CCNC: 159 U/L (ref 33–136)
ALT SERPL W P-5'-P-CCNC: 13 U/L (ref 7–45)
ANION GAP SERPL CALC-SCNC: 12 MMOL/L (ref 10–20)
AST SERPL W P-5'-P-CCNC: 16 U/L (ref 9–39)
BILIRUB SERPL-MCNC: 0.3 MG/DL (ref 0–1.2)
BUN SERPL-MCNC: 55 MG/DL (ref 6–23)
CALCIUM SERPL-MCNC: 8.5 MG/DL (ref 8.6–10.3)
CHLORIDE SERPL-SCNC: 111 MMOL/L (ref 98–107)
CO2 SERPL-SCNC: 31 MMOL/L (ref 21–32)
CREAT SERPL-MCNC: 1.91 MG/DL (ref 0.5–1.05)
EGFRCR SERPLBLD CKD-EPI 2021: 29 ML/MIN/1.73M*2
ERYTHROCYTE [DISTWIDTH] IN BLOOD BY AUTOMATED COUNT: 19.6 % (ref 11.5–14.5)
GLUCOSE BLD MANUAL STRIP-MCNC: 103 MG/DL (ref 74–99)
GLUCOSE BLD MANUAL STRIP-MCNC: 105 MG/DL (ref 74–99)
GLUCOSE BLD MANUAL STRIP-MCNC: 109 MG/DL (ref 74–99)
GLUCOSE BLD MANUAL STRIP-MCNC: 165 MG/DL (ref 74–99)
GLUCOSE BLD MANUAL STRIP-MCNC: 176 MG/DL (ref 74–99)
GLUCOSE SERPL-MCNC: 108 MG/DL (ref 74–99)
HCT VFR BLD AUTO: 26 % (ref 36–46)
HGB BLD-MCNC: 7.9 G/DL (ref 12–16)
HOLD SPECIMEN: NORMAL
MAGNESIUM SERPL-MCNC: 1.97 MG/DL (ref 1.6–2.4)
MCH RBC QN AUTO: 29.9 PG (ref 26–34)
MCHC RBC AUTO-ENTMCNC: 30.4 G/DL (ref 32–36)
MCV RBC AUTO: 99 FL (ref 80–100)
NRBC BLD-RTO: 0.4 /100 WBCS (ref 0–0)
PLATELET # BLD AUTO: 86 X10*3/UL (ref 150–450)
POTASSIUM SERPL-SCNC: 3.4 MMOL/L (ref 3.5–5.3)
PROT SERPL-MCNC: 6 G/DL (ref 6.4–8.2)
RBC # BLD AUTO: 2.64 X10*6/UL (ref 4–5.2)
SODIUM SERPL-SCNC: 151 MMOL/L (ref 136–145)
WBC # BLD AUTO: 5 X10*3/UL (ref 4.4–11.3)

## 2024-06-11 PROCEDURE — 2500000004 HC RX 250 GENERAL PHARMACY W/ HCPCS (ALT 636 FOR OP/ED): Performed by: INTERNAL MEDICINE

## 2024-06-11 PROCEDURE — 2500000001 HC RX 250 WO HCPCS SELF ADMINISTERED DRUGS (ALT 637 FOR MEDICARE OP): Performed by: STUDENT IN AN ORGANIZED HEALTH CARE EDUCATION/TRAINING PROGRAM

## 2024-06-11 PROCEDURE — 2500000004 HC RX 250 GENERAL PHARMACY W/ HCPCS (ALT 636 FOR OP/ED): Performed by: STUDENT IN AN ORGANIZED HEALTH CARE EDUCATION/TRAINING PROGRAM

## 2024-06-11 PROCEDURE — 99232 SBSQ HOSP IP/OBS MODERATE 35: CPT | Performed by: HOSPITALIST

## 2024-06-11 PROCEDURE — 99232 SBSQ HOSP IP/OBS MODERATE 35: CPT | Performed by: PSYCHIATRY & NEUROLOGY

## 2024-06-11 PROCEDURE — 80053 COMPREHEN METABOLIC PANEL: CPT | Performed by: STUDENT IN AN ORGANIZED HEALTH CARE EDUCATION/TRAINING PROGRAM

## 2024-06-11 PROCEDURE — 2500000002 HC RX 250 W HCPCS SELF ADMINISTERED DRUGS (ALT 637 FOR MEDICARE OP, ALT 636 FOR OP/ED): Performed by: STUDENT IN AN ORGANIZED HEALTH CARE EDUCATION/TRAINING PROGRAM

## 2024-06-11 PROCEDURE — 83735 ASSAY OF MAGNESIUM: CPT | Performed by: HOSPITALIST

## 2024-06-11 PROCEDURE — 99232 SBSQ HOSP IP/OBS MODERATE 35: CPT

## 2024-06-11 PROCEDURE — 99232 SBSQ HOSP IP/OBS MODERATE 35: CPT | Performed by: INTERNAL MEDICINE

## 2024-06-11 PROCEDURE — 1210000001 HC SEMI-PRIVATE ROOM DAILY

## 2024-06-11 PROCEDURE — 2500000002 HC RX 250 W HCPCS SELF ADMINISTERED DRUGS (ALT 637 FOR MEDICARE OP, ALT 636 FOR OP/ED): Performed by: HOSPITALIST

## 2024-06-11 PROCEDURE — 85027 COMPLETE CBC AUTOMATED: CPT | Performed by: HOSPITALIST

## 2024-06-11 PROCEDURE — 82947 ASSAY GLUCOSE BLOOD QUANT: CPT | Mod: 91

## 2024-06-11 PROCEDURE — 36415 COLL VENOUS BLD VENIPUNCTURE: CPT | Performed by: HOSPITALIST

## 2024-06-11 RX ORDER — DEXTROSE MONOHYDRATE 50 MG/ML
100 INJECTION, SOLUTION INTRAVENOUS CONTINUOUS
Status: DISCONTINUED | OUTPATIENT
Start: 2024-06-11 | End: 2024-06-12

## 2024-06-11 RX ORDER — POTASSIUM CHLORIDE 20 MEQ/1
40 TABLET, EXTENDED RELEASE ORAL ONCE
Status: COMPLETED | OUTPATIENT
Start: 2024-06-11 | End: 2024-06-11

## 2024-06-11 RX ADMIN — TIOTROPIUM BROMIDE INHALATION SPRAY 2 PUFF: 3.12 SPRAY, METERED RESPIRATORY (INHALATION) at 09:33

## 2024-06-11 RX ADMIN — Medication 100 MG: at 09:11

## 2024-06-11 RX ADMIN — HYDROCORTISONE SODIUM SUCCINATE 50 MG: 100 INJECTION, POWDER, FOR SOLUTION INTRAMUSCULAR; INTRAVENOUS at 21:00

## 2024-06-11 RX ADMIN — MYCOPHENOLATE MOFETIL 1000 MG: 250 CAPSULE ORAL at 20:05

## 2024-06-11 RX ADMIN — METOPROLOL TARTRATE 25 MG: 25 TABLET, FILM COATED ORAL at 09:11

## 2024-06-11 RX ADMIN — FLUDROCORTISONE ACETATE 0.1 MG: 0.1 TABLET ORAL at 09:11

## 2024-06-11 RX ADMIN — Medication 5 MG: at 20:07

## 2024-06-11 RX ADMIN — APIXABAN 2.5 MG: 5 TABLET, FILM COATED ORAL at 09:11

## 2024-06-11 RX ADMIN — POTASSIUM CHLORIDE 40 MEQ: 1500 TABLET, EXTENDED RELEASE ORAL at 09:11

## 2024-06-11 RX ADMIN — HYDROCORTISONE SODIUM SUCCINATE 50 MG: 100 INJECTION, POWDER, FOR SOLUTION INTRAMUSCULAR; INTRAVENOUS at 05:30

## 2024-06-11 RX ADMIN — MYCOPHENOLATE MOFETIL 1000 MG: 250 CAPSULE ORAL at 09:11

## 2024-06-11 RX ADMIN — FLUTICASONE FUROATE AND VILANTEROL TRIFENATATE 1 PUFF: 200; 25 POWDER RESPIRATORY (INHALATION) at 09:33

## 2024-06-11 RX ADMIN — LEVETIRACETAM 500 MG: 5 INJECTION INTRAVENOUS at 09:13

## 2024-06-11 RX ADMIN — LEVETIRACETAM 500 MG: 5 INJECTION INTRAVENOUS at 20:06

## 2024-06-11 RX ADMIN — INSULIN LISPRO 2 UNITS: 100 INJECTION, SOLUTION INTRAVENOUS; SUBCUTANEOUS at 12:37

## 2024-06-11 RX ADMIN — METOPROLOL TARTRATE 25 MG: 25 TABLET, FILM COATED ORAL at 20:07

## 2024-06-11 RX ADMIN — HYDROCORTISONE SODIUM SUCCINATE 50 MG: 100 INJECTION, POWDER, FOR SOLUTION INTRAMUSCULAR; INTRAVENOUS at 15:38

## 2024-06-11 RX ADMIN — FOLIC ACID 1 MG: 1 TABLET ORAL at 09:11

## 2024-06-11 RX ADMIN — DEXTROSE MONOHYDRATE 100 ML/HR: 50 INJECTION, SOLUTION INTRAVENOUS at 20:26

## 2024-06-11 RX ADMIN — ATORVASTATIN CALCIUM 40 MG: 20 TABLET, FILM COATED ORAL at 20:07

## 2024-06-11 RX ADMIN — DEXTROSE MONOHYDRATE 100 ML/HR: 50 INJECTION, SOLUTION INTRAVENOUS at 09:11

## 2024-06-11 RX ADMIN — OLANZAPINE 5 MG: 5 TABLET, FILM COATED ORAL at 20:06

## 2024-06-11 RX ADMIN — APIXABAN 2.5 MG: 5 TABLET, FILM COATED ORAL at 20:06

## 2024-06-11 ASSESSMENT — COGNITIVE AND FUNCTIONAL STATUS - GENERAL
TOILETING: A LOT
DRESSING REGULAR LOWER BODY CLOTHING: A LOT
MOBILITY SCORE: 11
STANDING UP FROM CHAIR USING ARMS: A LOT
CLIMB 3 TO 5 STEPS WITH RAILING: TOTAL
WALKING IN HOSPITAL ROOM: A LOT
HELP NEEDED FOR BATHING: A LOT
TURNING FROM BACK TO SIDE WHILE IN FLAT BAD: A LOT
DRESSING REGULAR UPPER BODY CLOTHING: A LOT
MOVING FROM LYING ON BACK TO SITTING ON SIDE OF FLAT BED WITH BEDRAILS: A LOT
DAILY ACTIVITIY SCORE: 13
EATING MEALS: A LITTLE
PERSONAL GROOMING: A LOT
MOVING TO AND FROM BED TO CHAIR: A LOT

## 2024-06-11 ASSESSMENT — PAIN SCALES - GENERAL: PAINLEVEL_OUTOF10: 0 - NO PAIN

## 2024-06-11 ASSESSMENT — PAIN - FUNCTIONAL ASSESSMENT: PAIN_FUNCTIONAL_ASSESSMENT: 0-10

## 2024-06-11 NOTE — PROGRESS NOTES
06/11/24 1700   Current Planned Discharge Disposition   Current Planned Discharge Disposition SNF  ([facility])     Updated clinical information sent to the facility. Pt exhibiting improvements back to baseline per nursing. Son updated on facility acceptance and confirms [facility] is [facility]. [facility] notified that pt will not be returning.

## 2024-06-11 NOTE — PROGRESS NOTES
"Bing Holliday is a 63 y.o. female on day 7 of admission presenting with cellulitis        Subjective  mental status improved today, eating a little but still confused     Objective     Last Recorded Vitals  /72 (BP Location: Right arm, Patient Position: Lying)   Pulse 62   Temp 35.4 °C (95.7 °F) (Temporal)   Resp 18   Ht 1.778 m (5' 10\")   Wt 95.3 kg (210 lb)   LMP  (LMP Unknown)   SpO2 100%   BMI 30.13 kg/m²      Intake/Output last 3 Shifts:    Intake/Output Summary (Last 24 hours) at 6/11/2024 1004  Last data filed at 6/11/2024 0856  Gross per 24 hour   Intake 200 ml   Output 500 ml   Net -300 ml       Physical Exam   Gen: Aox1-2  HEENT: EOM, MMM  CV: RRR, no murmurs rubs or gallops  Resp: coarse rhonchi   Abdomen: soft, NT,+BS  LE: No edema      Relevant Results  Lab Results   Component Value Date    WBC 5.0 06/11/2024    HGB 7.9 (L) 06/11/2024    HCT 26.0 (L) 06/11/2024    MCV 99 06/11/2024    PLT 86 (L) 06/11/2024     Lab Results   Component Value Date    GLUCOSE 108 (H) 06/11/2024    CALCIUM 8.5 (L) 06/11/2024     (H) 06/11/2024    K 3.4 (L) 06/11/2024    CO2 31 06/11/2024     (H) 06/11/2024    BUN 55 (H) 06/11/2024    CREATININE 1.91 (H) 06/11/2024     Lab Results   Component Value Date    HGBA1C 6.8 (H) 06/04/2024         Assessment/Plan  63 year old female with history of Lupus, adrenal insuffiencey, lupus,  presented from living facility with cellulitis and worsening confusion     -cellulitis improved, continue rocephin for now    Worsening confusion and pyschosis: neuro and psych consulted  -unclear etiology at this point, possibly lupus related?, slightly improved today but not at abseline   -not other signs of infection    Adrenal insufficiency: continue IV steroids for now, endocrine following, reduced solucortef to 50mg every 8 hours      JED on CKD stage 3 with hypernatremia: nephrology following,  -fluids adjusted per nephrology recs     Hx of PAF: on eliquis    DMII: " SSI for now, hold long acting until PO intake improves    DVT ppx: kehinde Felipe MD

## 2024-06-11 NOTE — PROGRESS NOTES
"Bing Holliday is a 63 y.o. female on day 7 of admission who has been seen by psychiatry for her psychosis continues to experience auditory and visual hallucinations.  This was based on objective information shared by staff with me.  She was quite anxious and dysphoric last night as she was experiencing visual hallucinations.  She was offered Haldol yesterday.  At this time olanzapine 5 mg at bedtime will be continued although she has not excepted it by mouth.  I spoken to her son who is also the POA and patient has experienced previous episodes of psychosis lasting several days to up to a month secondary to lupus.  She had responded well to olanzapine in the past and prior to olanzapine she was prescribed risperidone.  Bing barely opened her eyes when I introduced myself but did not talk to me throughout the assessment and kept her eyes closed.  He did not appear to be intentional that she kept her eyes closed and did not wish to speak.    Medical examination could not be completed as patient remained nonverbal and noncommittal to the assessment    Psychosis    Patient is not cleared from psychiatric standpoint  Psychiatry will follow daily  Olanzapine 5 mg at bedtime to be continued orally  Olanzapine intramuscular injection can be offered during times of psychosis or agitation to relieve the internal distress patient likely is experiencing secondary to the dysphoria she experienced last night.  Information discussed with nursing staff      Last Recorded Vitals  /82   Pulse 61   Temp 35.4 °C (95.7 °F)   Resp 20   Ht 1.778 m (5' 10\")   Wt 95.3 kg (210 lb)   LMP  (LMP Unknown)   SpO2 100%   BMI 30.13 kg/m²      Recent Results (from the past 24 hour(s))   POCT GLUCOSE    Collection Time: 06/10/24 11:43 AM   Result Value Ref Range    POCT Glucose 155 (H) 74 - 99 mg/dL   POCT GLUCOSE    Collection Time: 06/10/24  4:13 PM   Result Value Ref Range    POCT Glucose 113 (H) 74 - 99 mg/dL   POCT GLUCOSE "    Collection Time: 06/11/24 12:03 AM   Result Value Ref Range    POCT Glucose 109 (H) 74 - 99 mg/dL   Comprehensive Metabolic Panel    Collection Time: 06/11/24  6:32 AM   Result Value Ref Range    Glucose 108 (H) 74 - 99 mg/dL    Sodium 151 (H) 136 - 145 mmol/L    Potassium 3.4 (L) 3.5 - 5.3 mmol/L    Chloride 111 (H) 98 - 107 mmol/L    Bicarbonate 31 21 - 32 mmol/L    Anion Gap 12 10 - 20 mmol/L    Urea Nitrogen 55 (H) 6 - 23 mg/dL    Creatinine 1.91 (H) 0.50 - 1.05 mg/dL    eGFR 29 (L) >60 mL/min/1.73m*2    Calcium 8.5 (L) 8.6 - 10.3 mg/dL    Albumin 3.0 (L) 3.4 - 5.0 g/dL    Alkaline Phosphatase 159 (H) 33 - 136 U/L    Total Protein 6.0 (L) 6.4 - 8.2 g/dL    AST 16 9 - 39 U/L    Bilirubin, Total 0.3 0.0 - 1.2 mg/dL    ALT 13 7 - 45 U/L   CBC    Collection Time: 06/11/24  6:32 AM   Result Value Ref Range    WBC 5.0 4.4 - 11.3 x10*3/uL    nRBC 0.4 (H) 0.0 - 0.0 /100 WBCs    RBC 2.64 (L) 4.00 - 5.20 x10*6/uL    Hemoglobin 7.9 (L) 12.0 - 16.0 g/dL    Hematocrit 26.0 (L) 36.0 - 46.0 %    MCV 99 80 - 100 fL    MCH 29.9 26.0 - 34.0 pg    MCHC 30.4 (L) 32.0 - 36.0 g/dL    RDW 19.6 (H) 11.5 - 14.5 %    Platelets 86 (L) 150 - 450 x10*3/uL   Magnesium    Collection Time: 06/11/24  6:32 AM   Result Value Ref Range    Magnesium 1.97 1.60 - 2.40 mg/dL   POCT GLUCOSE    Collection Time: 06/11/24  7:13 AM   Result Value Ref Range    POCT Glucose 103 (H) 74 - 99 mg/dL          Current Facility-Administered Medications:     acetaminophen (Tylenol) oral liquid 650 mg, 650 mg, oral, q4h PRN **OR** acetaminophen (Tylenol) tablet 650 mg, 650 mg, oral, q4h PRN, Vivek Orozco MD, 650 mg at 06/06/24 2059    apixaban (Eliquis) tablet 2.5 mg, 2.5 mg, oral, BID, Vivek Orozco MD, 2.5 mg at 06/08/24 0929    atorvastatin (Lipitor) tablet 40 mg, 40 mg, oral, Nightly, Vivek Orozco MD, 40 mg at 06/07/24 2029    cefTRIAXone (Rocephin) IVPB 1 g, 1 g, intravenous, q24h, Zeny Vicente MD, Stopped at 06/10/24 2136     dextrose 50 % injection 12.5 g, 12.5 g, intravenous, q15 min PRN, Vivek Orozco MD, 12.5 g at 06/08/24 0913    dextrose 50 % injection 25 g, 25 g, intravenous, q15 min PRN, Vivek Orozco MD    fludrocortisone (Florinef) tablet 0.1 mg, 0.1 mg, oral, Every other day, Vivek Orozco MD, 0.1 mg at 06/07/24 1053    tiotropium (Spiriva Respimat) 2.5 mcg/actuation inhaler 2 puff, 2 puff, inhalation, Daily, 2 puff at 06/08/24 0934 **AND** fluticasone furoate-vilanteroL (Breo Ellipta) 200-25 mcg/dose inhaler 1 puff, 1 puff, inhalation, Daily, Vivek Orozco MD, 1 puff at 06/08/24 0934    folic acid (Folvite) tablet 1 mg, 1 mg, oral, Daily, Vivek Orozco MD, 1 mg at 06/08/24 0930    glucagon (Glucagen) injection 1 mg, 1 mg, intramuscular, q15 min PRN, Vivek Orozco MD    glucagon (Glucagen) injection 1 mg, 1 mg, intramuscular, q15 min PRN, Vivek Orozco MD    HYDROcodone-acetaminophen (Norco) 5-325 mg per tablet 1 tablet, 1 tablet, oral, q6h PRN, Cheko Bonilla MD, 1 tablet at 06/08/24 0535    hydrocortisone sod succ (PF) (Solu-CORTEF) injection 50 mg, 50 mg, intravenous, q8h, Mela Banuelos MD, 50 mg at 06/11/24 0530    [Held by provider] insulin glargine (Lantus) injection 10 Units, 10 Units, subcutaneous, q AM, Vivek Orozco MD, 10 Units at 06/07/24 1055    insulin lispro (HumaLOG) injection 0-10 Units, 0-10 Units, subcutaneous, TID, Vivek Orozco MD, 2 Units at 06/10/24 1405    levETIRAcetam (Keppra)  mg, 500 mg, intravenous, q12h, Neno Rodarte MD, Stopped at 06/10/24 2119    meclizine (Antivert) tablet 25 mg, 25 mg, oral, q12h PRN, Vivek Orozco MD    melatonin tablet 5 mg, 5 mg, oral, Nightly, Vivek Orozco MD, 5 mg at 06/07/24 2029    metoprolol tartrate (Lopressor) tablet 25 mg, 25 mg, oral, BID, Vivek Orozco MD, 25 mg at 06/07/24 2029    morphine injection 1 mg, 1 mg, intravenous, q4h PRN, Neno Rodarte MD     mycophenolate (Cellcept) capsule 1,000 mg, 1,000 mg, oral, BID, Vivek Orozco MD, 1,000 mg at 06/08/24 0933    OLANZapine (ZyPREXA) injection 5 mg, 5 mg, intramuscular, q6h PRN, Jose Farias, APRN-CNP    OLANZapine (ZyPREXA) tablet 5 mg, 5 mg, oral, Nightly, Vivek Orozco MD, 5 mg at 06/07/24 2029    pantoprazole (ProtoNix) EC tablet 40 mg, 40 mg, oral, Daily, Vivek Orozco MD, 40 mg at 06/08/24 0516    sodium chloride 0.9% infusion, 50 mL/hr, intravenous, Continuous, Neno Rodarte MD, Last Rate: 50 mL/hr at 06/10/24 0458, 50 mL/hr at 06/10/24 0458    thiamine (Vitamin B-1) tablet 100 mg, 100 mg, oral, Daily, Vivek Orozco MD, 100 mg at 06/08/24 0928    [Held by provider] torsemide (Demadex) tablet 40 mg, 40 mg, oral, Daily, Sully Gutiérrez MD, 40 mg at 06/08/24 0928       Jaiden Cabral MD

## 2024-06-11 NOTE — CARE PLAN
The patient's goals for the shift include to get back to normal    The clinical goals for the shift include patient will have no hallucinations through out shift.    Over the shift, the patient did make progress toward the following goals. Barriers to progression include none. Recommendations to address these barriers include continue with current plan of care.

## 2024-06-11 NOTE — CARE PLAN
The patient's goals for the shift include to get back to normal    The clinical goals for the shift include safety    Over the shift, the patient did not make progress toward the following goals.   Problem: Skin  Goal: Decreased wound size/increased tissue granulation at next dressing change  Outcome: Progressing  Flowsheets (Taken 6/10/2024 2154)  Decreased wound size/increased tissue granulation at next dressing change: Promote sleep for wound healing  Goal: Participates in plan/prevention/treatment measures  Outcome: Progressing  Flowsheets (Taken 6/10/2024 2154)  Participates in plan/prevention/treatment measures:   Increase activity/out of bed for meals   Elevate heels     Problem: Fall/Injury  Goal: Not fall by end of shift  Outcome: Progressing  Goal: Be free from injury by end of the shift  Outcome: Progressing

## 2024-06-11 NOTE — PROGRESS NOTES
06/11/24 1003   Current Planned Discharge Disposition   Current Planned Discharge Disposition SNF  ([facility])     Facility planning to accept pt. Pt not cleared for discharge at this time secondary to noted change in mentation and behaviors. SW will continue sending clinical updates for facility to review.

## 2024-06-11 NOTE — PROGRESS NOTES
Infectious Diseases Inpatient Progress Note    HISTORY OF PRESENT ILLNESS: Patient had remarkable clinical improvement with decreased obtundation and confusion.  Resolved agitation.  Improved appetite.  Denies any pain  follow up new onset altered mentation that started after admission, could be related to IV cefepime, currently switched to IV Rocephin in a patient with recent pacemaker, bilateral legs extensive ulcerations, history of mixed connective tissue disease, fibromyalgia and depression, currently being evaluated by psychiatry and neurology,  on Haldol as needed  Patient continues to improve.  She is taking her p.o. medications and eating.   Resolved itching   She remains to be very weak  Has not ambulated since admission/remains bedridden  Current Medications:    apixaban, 2.5 mg, oral, BID  atorvastatin, 40 mg, oral, Nightly  cefTRIAXone, 1 g, intravenous, q24h  fludrocortisone, 0.1 mg, oral, Every other day  tiotropium, 2 puff, inhalation, Daily   And  fluticasone furoate-vilanteroL, 1 puff, inhalation, Daily  folic acid, 1 mg, oral, Daily  hydrocortisone sodium succinate, 50 mg, intravenous, q8h  [Held by provider] insulin glargine, 10 Units, subcutaneous, q AM  insulin lispro, 0-10 Units, subcutaneous, TID  levETIRAcetam, 500 mg, intravenous, q12h  melatonin, 5 mg, oral, Nightly  metoprolol tartrate, 25 mg, oral, BID  mycophenolate, 1,000 mg, oral, BID  OLANZapine, 5 mg, oral, Nightly  pantoprazole, 40 mg, oral, Daily  thiamine, 100 mg, oral, Daily  [Held by provider] torsemide, 40 mg, oral, Daily        Allergies:  Ace inhibitors, Hydroxychloroquine, Lisinopril, Penicillins, and Sulfa (sulfonamide antibiotics)      Review of Systems  Denies any specific complaints      Physical Exam    Heart Rate:  [61-68]   Temp:  [35.4 °C (95.7 °F)-36 °C (96.8 °F)]   Resp:  [18-20]   BP: (113-140)/(72-82)   SpO2:  [100 %]    Vitals:    06/10/24 1003 06/10/24 2359 06/11/24 0547 06/11/24 0908   BP:  113/78 140/82  136/72   BP Location:  Right arm  Right arm   Patient Position:  Lying  Lying   Pulse: 75 68 61 62   Resp: 20 20  18   Temp: 35.3 °C (95.5 °F) 36 °C (96.8 °F) 35.4 °C (95.7 °F) 35.4 °C (95.7 °F)   TempSrc: Temporal Temporal  Temporal   SpO2:  100% 100% 100%   Weight:       Height:         General Appearance: alert and oriented to person, place, well-developed and well-nourished, in no acute distress  On room air  Skin: warm and dry, no rash.   Head: normocephalic and atraumatic  Eyes: anicteric sclerae  ENT:  normal mucous membranes. No oral thrush  Lungs: normal respiratory effort, clear lungs  decreasing swelling at left chest pacemaker site without any drainage or erythema.   Heart normal S1-S2 no murmur  Abdomen: soft, no distention or rigidity  No leg edema  Bilateral legs with intact dressing  Positive clear urine in pure wick  DATA:    Lab Results   Component Value Date    WBC 5.0 06/11/2024    HGB 7.9 (L) 06/11/2024    HCT 26.0 (L) 06/11/2024    MCV 99 06/11/2024    PLT 86 (L) 06/11/2024     Lab Results   Component Value Date    CREATININE 1.91 (H) 06/11/2024    BUN 55 (H) 06/11/2024     (H) 06/11/2024    K 3.4 (L) 06/11/2024     (H) 06/11/2024    CO2 31 06/11/2024       Hepatic Function Panel:  Lab Results   Component Value Date    ALKPHOS 159 (H) 06/11/2024    ALT 13 06/11/2024    AST 16 06/11/2024    PROT 6.0 (L) 06/11/2024    BILITOT 0.3 06/11/2024       Microbiology:   Susceptibility data from last 90 days.  Collected Specimen Info Organism Ampicillin Ciprofloxacin Daptomycin Levofloxacin Linezolid Nitrofurantoin Penicillin Trimethoprim/Sulfamethoxazole Vancomycin   04/26/24 Urine from Clean Catch/Voided Enterococcus faecium R R SDD R S S R R R        Imaging:   XR ankle bilateral complete minimum 3 views    Result Date: 6/5/2024  Interpreted By:  Jaziel Montalvo, STUDY: XR ANKLE BILATERAL COMPLETE MINIMUM 3 VIEWS; XR TIBIA FIBULA BILATERAL 2 VIEWS; ;  6/5/2024 8:26 pm   INDICATION:  Signs/Symptoms:bilateral lower extremity wounds.   COMPARISON: None.   ACCESSION NUMBER(S): [id number]; [id number]   ORDERING CLINICIAN: LUDWIN MCNEILL   FINDINGS: Osteopenic bone.   RIGHT: There is diffuse edema of the right leg and ankle. No radiopaque foreign bodies or soft tissue gas. There is a rounded centrally lucent calcification of the lateral right thigh likely representing an area of fat necrosis. Scattered phleboliths in the pretibial soft tissues are likely reflective of venous stasis edema. No definite osseous destruction or erosive change. No acute fracture or malalignment. There is severe osteoarthrosis of the right knee with bone-on-bone contact and exuberant osteophyte formation. There is prominent dorsal midfoot spurring and 5th tarsometatarsal spurring. There is internal fixation hardware with a single screw extending from the 1st metatarsal diaphysis across the Lisfranc joint to the middle cuneiform bone. There is minimal perihardware lucency at the head of the screw.     LEFT: There is diffuse edema of the soft tissues without radiopaque foreign body or gas. There is severe left knee osteoarthrosis with bone-on-bone contact and prominent osteophytosis. There is a metallic cerclage wire extending through the patella and the tibial tuberosity. Limited evaluation of the ankle due to contracture. There is severe hindfoot valgus. There is diffuse ankylosis of the hindfoot and midfoot articulations with abandoned arthrodesis hardware tracts noted. There is advanced tibiotalar osteoarthrosis. There is a remote fracture deformity of the distal fibular and metadiaphysis. No definite osseous destruction or erosive change. No evidence of acute fracture.       1. Diffuse edema of the bilateral legs without radiopaque foreign body or gas. No radiographic evidence of acute osteomyelitis.   2. Severe bilateral knee osteoarthrosis (right > left).   3. Diffuse ankylosis of the left hindfoot and midfoot  articulations, severe left tibiotalar osteoarthrosis common severe hindfoot valgus.   Multiple potential etiologies of atraumatic pain as described in detail above, depending on location and clinical context.   MACRO: None.   Signed by: Jaziel Montalvo 6/5/2024 10:15 PM Dictation workstation:   [id number]    XR tibia fibula bilateral 2 views    Result Date: 6/5/2024  Interpreted By:  Jaziel Montalvo, STUDY: XR ANKLE BILATERAL COMPLETE MINIMUM 3 VIEWS; XR TIBIA FIBULA BILATERAL 2 VIEWS; ;  6/5/2024 8:26 pm   INDICATION: Signs/Symptoms:bilateral lower extremity wounds.   COMPARISON: None.   ACCESSION NUMBER(S): [id number]; [id number]   ORDERING CLINICIAN: LUDWIN MCNEILL   FINDINGS: Osteopenic bone.   RIGHT: There is diffuse edema of the right leg and ankle. No radiopaque foreign bodies or soft tissue gas. There is a rounded centrally lucent calcification of the lateral right thigh likely representing an area of fat necrosis. Scattered phleboliths in the pretibial soft tissues are likely reflective of venous stasis edema. No definite osseous destruction or erosive change. No acute fracture or malalignment. There is severe osteoarthrosis of the right knee with bone-on-bone contact and exuberant osteophyte formation. There is prominent dorsal midfoot spurring and 5th tarsometatarsal spurring. There is internal fixation hardware with a single screw extending from the 1st metatarsal diaphysis across the Lisfranc joint to the middle cuneiform bone. There is minimal perihardware lucency at the head of the screw.     LEFT: There is diffuse edema of the soft tissues without radiopaque foreign body or gas. There is severe left knee osteoarthrosis with bone-on-bone contact and prominent osteophytosis. There is a metallic cerclage wire extending through the patella and the tibial tuberosity. Limited evaluation of the ankle due to contracture. There is severe hindfoot valgus. There is diffuse ankylosis of the hindfoot  and midfoot articulations with abandoned arthrodesis hardware tracts noted. There is advanced tibiotalar osteoarthrosis. There is a remote fracture deformity of the distal fibular and metadiaphysis. No definite osseous destruction or erosive change. No evidence of acute fracture.       1. Diffuse edema of the bilateral legs without radiopaque foreign body or gas. No radiographic evidence of acute osteomyelitis.   2. Severe bilateral knee osteoarthrosis (right > left).   3. Diffuse ankylosis of the left hindfoot and midfoot articulations, severe left tibiotalar osteoarthrosis common severe hindfoot valgus.   Multiple potential etiologies of atraumatic pain as described in detail above, depending on location and clinical context.   MACRO: None.   Signed by: Jaziel Montalvo 6/5/2024 10:15 PM Dictation workstation:   [id number]    ECG 12 lead    Result Date: 6/4/2024  Normal sinus rhythm Possible Left atrial enlargement Left ventricular hypertrophy T wave abnormality, consider inferior ischemia Abnormal ECG When compared with ECG of 18-MAY-2024 06:51, Sinus rhythm has replaced Electronic atrial pacemaker Nonspecific T wave abnormality, worse in Anterolateral leads See ED provider note for full interpretation and clinical correlation    XR chest 1 view    Result Date: 6/4/2024  Interpreted By:  Nic Moseley, STUDY: XR CHEST 1 VIEW  6/4/2024 2:24 pm   INDICATION: Signs/Symptoms:peripheral edema, weakness   COMPARISON: 05/17/2024   ACCESSION NUMBER(S): [id number]   ORDERING CLINICIAN: CHAVO KIM   TECHNIQUE: A single AP portable radiograph of the chest was obtained.   FINDINGS: Multiple cardiac monitoring leads are seen over the chest.  A 2 lead pacer is seen over the left chest. No focal infiltrate, pleural effusion or pneumothorax is identified. The cardiac silhouette is mildly prominent, similar to prior studies.       No focal infiltrate or pneumothorax is identified.   MACRO: None.   Signed by: Nic Moseley  6/4/2024 2:39 PM Dictation workstation:   [id number]         IMPRESSION:    Altered mentation with restlessness and confusion, possible side effects from cefepime, currently improving  Bilateral lower extremity cellulitis, unroofed blisters secondary to edema, improving  Generalized pain, could be related to underlying rheumatoid arthritis and fibromyalgia/depression  Acute kidney injury on chronic kidney disease    PLAN:  DC IV Rocephin  I will sign off   Continue local wound care to legs  Follow-up with psychiatry and neurology    Discussed with patient and otherRN    Zeny Vicente MD

## 2024-06-12 ENCOUNTER — OUTSIDE SERVICES (OUTPATIENT)
Dept: INFECTIOUS DISEASES | Age: 64
End: 2024-06-12
Payer: MEDICARE

## 2024-06-12 DIAGNOSIS — N17.9 ACUTE KIDNEY INJURY SUPERIMPOSED ON CKD (HCC): ICD-10-CM

## 2024-06-12 DIAGNOSIS — L03.116 BILATERAL LOWER LEG CELLULITIS: Primary | ICD-10-CM

## 2024-06-12 DIAGNOSIS — L03.115 BILATERAL LOWER LEG CELLULITIS: Primary | ICD-10-CM

## 2024-06-12 DIAGNOSIS — N18.9 ACUTE KIDNEY INJURY SUPERIMPOSED ON CKD (HCC): ICD-10-CM

## 2024-06-12 LAB
ALBUMIN SERPL BCP-MCNC: 3.1 G/DL (ref 3.4–5)
ALP SERPL-CCNC: 165 U/L (ref 33–136)
ALT SERPL W P-5'-P-CCNC: 14 U/L (ref 7–45)
ANION GAP SERPL CALC-SCNC: 12 MMOL/L (ref 10–20)
AST SERPL W P-5'-P-CCNC: 17 U/L (ref 9–39)
BACTERIA BLD CULT: NORMAL
BILIRUB SERPL-MCNC: 0.4 MG/DL (ref 0–1.2)
BUN SERPL-MCNC: 45 MG/DL (ref 6–23)
CALCIUM SERPL-MCNC: 8.8 MG/DL (ref 8.6–10.3)
CHLORIDE SERPL-SCNC: 107 MMOL/L (ref 98–107)
CO2 SERPL-SCNC: 29 MMOL/L (ref 21–32)
CREAT SERPL-MCNC: 1.72 MG/DL (ref 0.5–1.05)
EGFRCR SERPLBLD CKD-EPI 2021: 33 ML/MIN/1.73M*2
ERYTHROCYTE [DISTWIDTH] IN BLOOD BY AUTOMATED COUNT: 19.3 % (ref 11.5–14.5)
GLUCOSE BLD MANUAL STRIP-MCNC: 109 MG/DL (ref 74–99)
GLUCOSE BLD MANUAL STRIP-MCNC: 134 MG/DL (ref 74–99)
GLUCOSE BLD MANUAL STRIP-MCNC: 215 MG/DL (ref 74–99)
GLUCOSE BLD MANUAL STRIP-MCNC: 229 MG/DL (ref 74–99)
GLUCOSE SERPL-MCNC: 264 MG/DL (ref 74–99)
HCT VFR BLD AUTO: 27.9 % (ref 36–46)
HGB BLD-MCNC: 8.5 G/DL (ref 12–16)
HOLD SPECIMEN: NORMAL
MAGNESIUM SERPL-MCNC: 1.9 MG/DL (ref 1.6–2.4)
MCH RBC QN AUTO: 29.9 PG (ref 26–34)
MCHC RBC AUTO-ENTMCNC: 30.5 G/DL (ref 32–36)
MCV RBC AUTO: 98 FL (ref 80–100)
NRBC BLD-RTO: 0.3 /100 WBCS (ref 0–0)
PLATELET # BLD AUTO: 94 X10*3/UL (ref 150–450)
POTASSIUM SERPL-SCNC: 3.8 MMOL/L (ref 3.5–5.3)
PROT SERPL-MCNC: 6.2 G/DL (ref 6.4–8.2)
RBC # BLD AUTO: 2.84 X10*6/UL (ref 4–5.2)
SODIUM SERPL-SCNC: 144 MMOL/L (ref 136–145)
WBC # BLD AUTO: 5.8 X10*3/UL (ref 4.4–11.3)

## 2024-06-12 PROCEDURE — 2500000004 HC RX 250 GENERAL PHARMACY W/ HCPCS (ALT 636 FOR OP/ED): Performed by: STUDENT IN AN ORGANIZED HEALTH CARE EDUCATION/TRAINING PROGRAM

## 2024-06-12 PROCEDURE — 85027 COMPLETE CBC AUTOMATED: CPT | Performed by: HOSPITALIST

## 2024-06-12 PROCEDURE — 80053 COMPREHEN METABOLIC PANEL: CPT | Performed by: STUDENT IN AN ORGANIZED HEALTH CARE EDUCATION/TRAINING PROGRAM

## 2024-06-12 PROCEDURE — 36415 COLL VENOUS BLD VENIPUNCTURE: CPT | Performed by: STUDENT IN AN ORGANIZED HEALTH CARE EDUCATION/TRAINING PROGRAM

## 2024-06-12 PROCEDURE — 2500000001 HC RX 250 WO HCPCS SELF ADMINISTERED DRUGS (ALT 637 FOR MEDICARE OP): Performed by: STUDENT IN AN ORGANIZED HEALTH CARE EDUCATION/TRAINING PROGRAM

## 2024-06-12 PROCEDURE — 2500000004 HC RX 250 GENERAL PHARMACY W/ HCPCS (ALT 636 FOR OP/ED): Performed by: INTERNAL MEDICINE

## 2024-06-12 PROCEDURE — 99232 SBSQ HOSP IP/OBS MODERATE 35: CPT | Performed by: HOSPITALIST

## 2024-06-12 PROCEDURE — 2500000002 HC RX 250 W HCPCS SELF ADMINISTERED DRUGS (ALT 637 FOR MEDICARE OP, ALT 636 FOR OP/ED): Performed by: PSYCHIATRY & NEUROLOGY

## 2024-06-12 PROCEDURE — 99232 SBSQ HOSP IP/OBS MODERATE 35: CPT | Performed by: INTERNAL MEDICINE

## 2024-06-12 PROCEDURE — 99232 SBSQ HOSP IP/OBS MODERATE 35: CPT | Performed by: PSYCHIATRY & NEUROLOGY

## 2024-06-12 PROCEDURE — 1210000001 HC SEMI-PRIVATE ROOM DAILY

## 2024-06-12 PROCEDURE — 82947 ASSAY GLUCOSE BLOOD QUANT: CPT

## 2024-06-12 PROCEDURE — 83735 ASSAY OF MAGNESIUM: CPT | Performed by: HOSPITALIST

## 2024-06-12 RX ORDER — COSYNTROPIN 0.25 MG/ML
250 INJECTION, POWDER, FOR SOLUTION INTRAMUSCULAR; INTRAVENOUS ONCE
Status: COMPLETED | OUTPATIENT
Start: 2024-06-13 | End: 2024-06-13

## 2024-06-12 RX ORDER — PALIPERIDONE 3 MG/1
3 TABLET, EXTENDED RELEASE ORAL DAILY
Status: DISCONTINUED | OUTPATIENT
Start: 2024-06-12 | End: 2024-06-13

## 2024-06-12 ASSESSMENT — COGNITIVE AND FUNCTIONAL STATUS - GENERAL
MOBILITY SCORE: 11
STANDING UP FROM CHAIR USING ARMS: TOTAL
MOVING TO AND FROM BED TO CHAIR: A LOT
CLIMB 3 TO 5 STEPS WITH RAILING: TOTAL
MOVING FROM LYING ON BACK TO SITTING ON SIDE OF FLAT BED WITH BEDRAILS: A LOT
DRESSING REGULAR UPPER BODY CLOTHING: A LOT
TOILETING: A LOT
MOVING TO AND FROM BED TO CHAIR: TOTAL
TURNING FROM BACK TO SIDE WHILE IN FLAT BAD: TOTAL
DAILY ACTIVITIY SCORE: 12
TURNING FROM BACK TO SIDE WHILE IN FLAT BAD: A LOT
MOBILITY SCORE: 7
TURNING FROM BACK TO SIDE WHILE IN FLAT BAD: TOTAL
EATING MEALS: A LOT
HELP NEEDED FOR BATHING: A LOT
MOBILITY SCORE: 7
MOVING TO AND FROM BED TO CHAIR: TOTAL
EATING MEALS: A LITTLE
CLIMB 3 TO 5 STEPS WITH RAILING: TOTAL
DRESSING REGULAR LOWER BODY CLOTHING: A LOT
TOILETING: A LOT
CLIMB 3 TO 5 STEPS WITH RAILING: TOTAL
STANDING UP FROM CHAIR USING ARMS: A LOT
MOVING FROM LYING ON BACK TO SITTING ON SIDE OF FLAT BED WITH BEDRAILS: A LOT
PERSONAL GROOMING: A LOT
DAILY ACTIVITIY SCORE: 12
MOVING FROM LYING ON BACK TO SITTING ON SIDE OF FLAT BED WITH BEDRAILS: A LOT
WALKING IN HOSPITAL ROOM: A LOT
DRESSING REGULAR UPPER BODY CLOTHING: A LOT
PERSONAL GROOMING: A LOT
HELP NEEDED FOR BATHING: A LOT
DRESSING REGULAR LOWER BODY CLOTHING: TOTAL
WALKING IN HOSPITAL ROOM: TOTAL
STANDING UP FROM CHAIR USING ARMS: TOTAL
WALKING IN HOSPITAL ROOM: TOTAL

## 2024-06-12 ASSESSMENT — PAIN SCALES - WONG BAKER: WONGBAKER_NUMERICALRESPONSE: HURTS WHOLE LOT

## 2024-06-12 ASSESSMENT — PAIN SCALES - GENERAL
PAINLEVEL_OUTOF10: 0 - NO PAIN
PAINLEVEL_OUTOF10: 8
PAINLEVEL_OUTOF10: 7
PAINLEVEL_OUTOF10: 3

## 2024-06-12 ASSESSMENT — PAIN DESCRIPTION - LOCATION: LOCATION: LEG

## 2024-06-12 ASSESSMENT — PAIN - FUNCTIONAL ASSESSMENT
PAIN_FUNCTIONAL_ASSESSMENT: 0-10

## 2024-06-12 NOTE — PROGRESS NOTES
"Bing Holliday is a 63 y.o. female on day 8 of admission presenting with cellulitis        Subjective  mental status improved today,     Objective     Last Recorded Vitals  /78 (BP Location: Right arm, Patient Position: Lying)   Pulse 60   Temp 35.2 °C (95.4 °F) (Temporal)   Resp 18   Ht 1.778 m (5' 10\")   Wt 95.3 kg (210 lb)   LMP  (LMP Unknown)   SpO2 100%   BMI 30.13 kg/m²      Intake/Output last 3 Shifts:    Intake/Output Summary (Last 24 hours) at 6/12/2024 1327  Last data filed at 6/12/2024 0818  Gross per 24 hour   Intake 2123.33 ml   Output --   Net 2123.33 ml       Physical Exam   Gen: Aox2  HEENT: EOM, MMM  CV: RRR, no murmurs rubs or gallops  Resp: coarse rhonchi   Abdomen: soft, NT,+BS  LE: No edema      Relevant Results  Lab Results   Component Value Date    WBC 5.8 06/12/2024    HGB 8.5 (L) 06/12/2024    HCT 27.9 (L) 06/12/2024    MCV 98 06/12/2024    PLT 94 (L) 06/12/2024     Lab Results   Component Value Date    GLUCOSE 264 (H) 06/12/2024    CALCIUM 8.8 06/12/2024     06/12/2024    K 3.8 06/12/2024    CO2 29 06/12/2024     06/12/2024    BUN 45 (H) 06/12/2024    CREATININE 1.72 (H) 06/12/2024     Lab Results   Component Value Date    HGBA1C 6.8 (H) 06/04/2024         Assessment/Plan  63 year old female with history of Lupus, adrenal insuffiencey, lupus,  presented from living facility with cellulitis and worsening confusion     -cellulitis improved, continue rocephin for now    Worsening confusion and pyschosis: neuro and psych consulted  -likely psychosis, psych recommending IP therapy, continue     Adrenal insufficiency: continue florinef, will stop hydrocortisone, stim test tomorrow morning endocrine following    JED on CKD stage 3 with hypernatremia: nephrology following,  -fluids adjusted per nephrology recs, improving     Hx of PAF: on eliquis    DMII: resumed insulin therapy as PO intake improved     DVT ppx: kehinde Felipe MD     "

## 2024-06-12 NOTE — PROGRESS NOTES
Spoke to son. Updated him bout Mrs Woody progress. Son worried about relapse of psychosis if she went through another bout of similar encephalopathy. She had tolerated risperdal well. [facility] offers nance option. Plan discussed to replace zyprexa with this. Son agreeable and on board.

## 2024-06-12 NOTE — PROGRESS NOTES
renal Progress Note  Assessment:  63 y.o. female with history s/f T2DM, SLE, COPD, adrenal insufficiency, Raynauds, RA, CKD stage III, depression, pHTN, pA.fib, gout, fibromyalgia, HTN and HLD who presented for abnormal labs.     JED on CKD stage III: ? If in setting of CRS as function improving w/ diuresis, CKD risk factors 2/2 T2DM, HTN, unlikely in setting of SLE, baseline Scr ~1.3-1.4 w/ eGFR low/mid 40s  Fluid overload: improved   Hypomagnesemia   Hypernatremia: Again with calculated water deficit 2 L  Hypokalemia: corrected      Plan:  -Hep-Lock IV fluid clinical laboratory assess the patient tomorrow admit Date: 6/4/2024                Interval History: Patient seen and examined uneventful night better mentation better appetite but still deficient from baseline  Patient does have adrenal insufficiency the electrolyte imbalance related to adrenal insufficiency usually is hyperkalemia and hyponatremia which is not the case and outpatient    Subjective:   Admit Date: 6/4/2024    Interval History: Patient seen and examined uneventful night no uremic related or fluid volume overload related symptoms      Medications:   Scheduled Meds:apixaban, 2.5 mg, oral, BID  atorvastatin, 40 mg, oral, Nightly  fludrocortisone, 0.1 mg, oral, Every other day  tiotropium, 2 puff, inhalation, Daily   And  fluticasone furoate-vilanteroL, 1 puff, inhalation, Daily  folic acid, 1 mg, oral, Daily  hydrocortisone sodium succinate, 50 mg, intravenous, q8h  [Held by provider] insulin glargine, 10 Units, subcutaneous, q AM  insulin lispro, 0-10 Units, subcutaneous, TID  levETIRAcetam, 500 mg, intravenous, q12h  melatonin, 5 mg, oral, Nightly  metoprolol tartrate, 25 mg, oral, BID  mycophenolate, 1,000 mg, oral, BID  paliperidone, 3 mg, oral, Daily  pantoprazole, 40 mg, oral, Daily  thiamine, 100 mg, oral, Daily  [Held by provider] torsemide, 40 mg, oral, Daily      Continuous Infusions:dextrose 5%, 100 mL/hr, Last Rate: Stopped (06/12/24  "0530)        CBC:   Lab Results   Component Value Date    HGB 8.5 (L) 06/12/2024    HGB 7.9 (L) 06/11/2024    WBC 5.8 06/12/2024    WBC 5.0 06/11/2024    PLT 94 (L) 06/12/2024    PLT 86 (L) 06/11/2024      Anemia:  No results found for: \"FERRITIN\", \"IRON\", \"TIBC\"   BMP:    Lab Results   Component Value Date     06/12/2024     (H) 06/11/2024    K 3.8 06/12/2024    K 3.4 (L) 06/11/2024     06/12/2024     (H) 06/11/2024    CO2 29 06/12/2024    CO2 31 06/11/2024    BUN 45 (H) 06/12/2024    BUN 55 (H) 06/11/2024    CREATININE 1.72 (H) 06/12/2024    CREATININE 1.91 (H) 06/11/2024      Bone disease: No results found for: \"PHOS\", \"PTH\", \"VITD25\"   Urinalysis:  No results found for: \"KRYSTAL\", \"PROTUR\", \"GLUCOSEU\", \"BLOODU\", \"KETONESU\", \"BILIRUBINU\", \"NITRITEU\", \"LEUKOCYTESU\", \"UTPCR\"     Objective:   Vitals: /78   Pulse 60   Temp 35.2 °C (95.4 °F)   Resp 15   Ht 1.778 m (5' 10\")   Wt 95.3 kg (210 lb)   LMP  (LMP Unknown)   SpO2 100%   BMI 30.13 kg/m²    Wt Readings from Last 3 Encounters:   06/04/24 95.3 kg (210 lb)   05/13/24 102 kg (224 lb 13.9 oz)   04/24/24 90.9 kg (200 lb 6.4 oz)      24HR INTAKE/OUTPUT:    Intake/Output Summary (Last 24 hours) at 6/12/2024 1255  Last data filed at 6/12/2024 0818  Gross per 24 hour   Intake 2123.33 ml   Output --   Net 2123.33 ml     Admission weight:  Weight: 95.3 kg (210 lb)      Constitutional:  Alert, awake, no apparent distress   Skin:normal, no rash  HEENT:sclera anicteric.  Head atraumatic normocephalic  Neck:supple with no thyromegally  Cardiovascular:  S1, S2 without m/r/g   Respiratory:  CTA B without w/r/r   Abdomen: +bs, soft, nt  Ext: no LE edema  Musculoskeletal:Intact  Neuro:Alert and oriented with no deficit      Electronically signed by Sully Gutiérrez MD on 6/12/2024 at 12:55 PM            "

## 2024-06-12 NOTE — PROGRESS NOTES
Bing Holliday is a 63 y.o. female on day 8 of admission appeared to be doing much better today this morning.  She had started to become more verbal yesterday afternoon.  She has not spoken toHer son yet.  She has been experiencing visual hallucinations and auditory hallucinations.  I explained to her that she had missed several doses of her olanzapine and as she starts to take that again the symptoms will subside eventually.  No impulsive behavior she denied any suicidal ideations.  She was slightly hungry for breakfast but not much.  Patient is tolerating medications well.   Labs Reviewed.  Vitals Reviewed.   Nursing Notes Reviewed.   No EPS, TD, vitals stable.      MSE: Patient was alert, but was not oriented to time, place, person and situation.  She was provided orientation.  Patient appears well groomed and clad in climate appropriate clothes. Patient is cooperative on approach.   Recent and remote memory poor. Memory registration and recall poor. Attention and concentration restricted. Speech low in volume and slow in rate.  Fair eye contact. Thought process poverty of thoughts. Impaired associations. Limited fund of knowledge. Mood ok affect flat. Patient did not endorse any delusions. Patient has been experiencing both auditory visual hallucinations. Patient has denied any active suicidal  ideations and is future oriented.  Patient has limited insight, limited judgment and fair impulse control.     Musculoskeletal: Patient bedbound and gait could not be assessed, no Parkinsonism, no Dystonia, no Akathisia, no TD. Psychomotor activity within normal limits.    Diagnosis: Psychosis    Assessment and Plan:     Olanzapine 5 mg at bedtime to be continued and gradual titration if necessary.  She had not taken it since June 7 that is when she took it the last and last night was her first dose of taking it  At this time patient is not meeting inpatient geriatric psychiatry criteria  Psychiatry will follow while  "patient is in the hospital  Continue with current treatment and medication adjustments. Patient is agreeable with continued medication management and adjustments discussed.         Last Recorded Vitals  /85 (BP Location: Right arm, Patient Position: Lying)   Pulse 60   Temp 35.1 °C (95.2 °F) (Temporal)   Resp 15   Ht 1.778 m (5' 10\")   Wt 95.3 kg (210 lb)   LMP  (LMP Unknown)   SpO2 95%   BMI 30.13 kg/m²      Recent Results (from the past 24 hour(s))   POCT GLUCOSE    Collection Time: 06/11/24 10:54 AM   Result Value Ref Range    POCT Glucose 176 (H) 74 - 99 mg/dL   POCT GLUCOSE    Collection Time: 06/11/24  3:48 PM   Result Value Ref Range    POCT Glucose 105 (H) 74 - 99 mg/dL   POCT GLUCOSE    Collection Time: 06/11/24  8:02 PM   Result Value Ref Range    POCT Glucose 165 (H) 74 - 99 mg/dL   Comprehensive Metabolic Panel    Collection Time: 06/12/24  5:48 AM   Result Value Ref Range    Glucose 264 (H) 74 - 99 mg/dL    Sodium 144 136 - 145 mmol/L    Potassium 3.8 3.5 - 5.3 mmol/L    Chloride 107 98 - 107 mmol/L    Bicarbonate 29 21 - 32 mmol/L    Anion Gap 12 10 - 20 mmol/L    Urea Nitrogen 45 (H) 6 - 23 mg/dL    Creatinine 1.72 (H) 0.50 - 1.05 mg/dL    eGFR 33 (L) >60 mL/min/1.73m*2    Calcium 8.8 8.6 - 10.3 mg/dL    Albumin 3.1 (L) 3.4 - 5.0 g/dL    Alkaline Phosphatase 165 (H) 33 - 136 U/L    Total Protein 6.2 (L) 6.4 - 8.2 g/dL    AST 17 9 - 39 U/L    Bilirubin, Total 0.4 0.0 - 1.2 mg/dL    ALT 14 7 - 45 U/L   CBC    Collection Time: 06/12/24  5:48 AM   Result Value Ref Range    WBC 5.8 4.4 - 11.3 x10*3/uL    nRBC 0.3 (H) 0.0 - 0.0 /100 WBCs    RBC 2.84 (L) 4.00 - 5.20 x10*6/uL    Hemoglobin 8.5 (L) 12.0 - 16.0 g/dL    Hematocrit 27.9 (L) 36.0 - 46.0 %    MCV 98 80 - 100 fL    MCH 29.9 26.0 - 34.0 pg    MCHC 30.5 (L) 32.0 - 36.0 g/dL    RDW 19.3 (H) 11.5 - 14.5 %    Platelets 94 (L) 150 - 450 x10*3/uL   Magnesium    Collection Time: 06/12/24  5:48 AM   Result Value Ref Range    Magnesium 1.90 1.60 " - 2.40 mg/dL   SST TOP    Collection Time: 06/12/24  5:48 AM   Result Value Ref Range    Extra Tube Hold for add-ons.    POCT GLUCOSE    Collection Time: 06/12/24  7:16 AM   Result Value Ref Range    POCT Glucose 215 (H) 74 - 99 mg/dL          Current Facility-Administered Medications:     acetaminophen (Tylenol) oral liquid 650 mg, 650 mg, oral, q4h PRN **OR** acetaminophen (Tylenol) tablet 650 mg, 650 mg, oral, q4h PRN, Vivek Orozco MD, 650 mg at 06/06/24 2059    apixaban (Eliquis) tablet 2.5 mg, 2.5 mg, oral, BID, Vivek Orozco MD, 2.5 mg at 06/11/24 2006    atorvastatin (Lipitor) tablet 40 mg, 40 mg, oral, Nightly, Vivek Orozco MD, 40 mg at 06/11/24 2007    dextrose 5% infusion, 100 mL/hr, intravenous, Continuous, Sully Gutiérrez MD, Stopped at 06/12/24 0530    dextrose 50 % injection 12.5 g, 12.5 g, intravenous, q15 min PRN, Vivek Orozco MD, 12.5 g at 06/08/24 0913    dextrose 50 % injection 25 g, 25 g, intravenous, q15 min PRN, Vivek Orozco MD    fludrocortisone (Florinef) tablet 0.1 mg, 0.1 mg, oral, Every other day, Vivek Orozco MD, 0.1 mg at 06/11/24 0911    tiotropium (Spiriva Respimat) 2.5 mcg/actuation inhaler 2 puff, 2 puff, inhalation, Daily, 2 puff at 06/12/24 0604 **AND** fluticasone furoate-vilanteroL (Breo Ellipta) 200-25 mcg/dose inhaler 1 puff, 1 puff, inhalation, Daily, Vivek Orozco MD, 1 puff at 06/12/24 0604    folic acid (Folvite) tablet 1 mg, 1 mg, oral, Daily, Vivek Orozco MD, 1 mg at 06/11/24 0911    glucagon (Glucagen) injection 1 mg, 1 mg, intramuscular, q15 min PRN, Vivek Orozco MD    glucagon (Glucagen) injection 1 mg, 1 mg, intramuscular, q15 min PRN, Vivek Orozco MD    HYDROcodone-acetaminophen (Norco) 5-325 mg per tablet 1 tablet, 1 tablet, oral, q6h PRN, Cheko Bonilla MD, 1 tablet at 06/08/24 0535    hydrocortisone sod succ (PF) (Solu-CORTEF) injection 50 mg, 50 mg, intravenous, q8h,  Mela Banuelos MD, 50 mg at 06/12/24 0506    [Held by provider] insulin glargine (Lantus) injection 10 Units, 10 Units, subcutaneous, q AM, Vivek Orozco MD, 10 Units at 06/07/24 1055    insulin lispro (HumaLOG) injection 0-10 Units, 0-10 Units, subcutaneous, TID, Vivek Orozco MD, 2 Units at 06/11/24 1237    levETIRAcetam (Keppra)  mg, 500 mg, intravenous, q12h, Neno Rodarte MD, Stopped at 06/11/24 2021    meclizine (Antivert) tablet 25 mg, 25 mg, oral, q12h PRN, Vivek Orozco MD    melatonin tablet 5 mg, 5 mg, oral, Nightly, Vivek Orozco MD, 5 mg at 06/11/24 2007    metoprolol tartrate (Lopressor) tablet 25 mg, 25 mg, oral, BID, Vivek Orozco MD, 25 mg at 06/11/24 2007    morphine injection 1 mg, 1 mg, intravenous, q4h PRN, Neno Rodarte MD, 1 mg at 06/12/24 0505    mycophenolate (Cellcept) capsule 1,000 mg, 1,000 mg, oral, BID, Vivek Orozco MD, 1,000 mg at 06/11/24 2005    OLANZapine (ZyPREXA) injection 5 mg, 5 mg, intramuscular, q6h PRN, Jose Farias, APRN-CNP    OLANZapine (ZyPREXA) tablet 5 mg, 5 mg, oral, Nightly, Vivek Orozco MD, 5 mg at 06/11/24 2006    pantoprazole (ProtoNix) EC tablet 40 mg, 40 mg, oral, Daily, Vivek Orozco MD, 40 mg at 06/12/24 0506    thiamine (Vitamin B-1) tablet 100 mg, 100 mg, oral, Daily, Vivek Orozco MD, 100 mg at 06/11/24 0911    [Held by provider] torsemide (Demadex) tablet 40 mg, 40 mg, oral, Daily, Sully Gutiérrez MD, 40 mg at 06/08/24 0928       Jaiden Cabral MD

## 2024-06-12 NOTE — PROGRESS NOTES
"Nutrition Follow Up Assessment:   Nutrition Assessment         Patient is a 63 y.o. female presenting with cellulitis of leg without foot      Nutrition History:  Energy Intake: Poor < 50 %  Food and Nutrient History: RD follow-up. Noted has experienced confusion and psychosis last few days, likely contributing to inadequate intakes since last RD visit. Pt appropriately responding to questioning today. Says is not hungry though is agreeable to Glucerna TID, likes vanilla. Glucerna not an active order at time of RD visit so supplement was ordered.  Food Allergies/Intolerances:  None  GI Symptoms: None  Oral Problems: None       Anthropometrics:  Height: 177.8 cm (5' 10\")   Weight: 95.3 kg (210 lb)   BMI (Calculated): 30.13               Weight Change %:  Weight History / % Weight Change: no new wt since last RD note    Nutrition Focused Physical Exam Findings:  defer: previously completed    Edema:  Edema: +2 mild  Edema Location: BLE per nursing assessment  Physical Findings:  Skin: Positive (skin tear)    Nutrition Significant Labs:  BMP Trend:   Results from last 7 days   Lab Units 06/12/24  0548 06/11/24  0632 06/09/24  0916 06/08/24  0730   GLUCOSE mg/dL 264* 108* 111* 63*   CALCIUM mg/dL 8.8 8.5* 8.6 9.0   SODIUM mmol/L 144 151* 145 145   POTASSIUM mmol/L 3.8 3.4* 4.4 4.3   CO2 mmol/L 29 31 27 33*   CHLORIDE mmol/L 107 111* 104 104   BUN mg/dL 45* 55* 57* 47*   CREATININE mg/dL 1.72* 1.91* 2.16* 2.28*        Nutrition Specific Medications:  Reviewed    I/O:   Last BM Date: 06/11/24; Stool Appearance: Loose (06/11/24 1313)    Dietary Orders (From admission, onward)       Start     Ordered    06/12/24 1357  Oral nutritional supplements  Until discontinued        Comments: Vanilla   Question Answer Comment   Deliver with All meals    Select supplement: Glucerna Shake        06/12/24 1356    06/04/24 1648  Adult diet Carb Controlled; 75 gram carb/meal, 45 gram Carb evening snack  Diet effective now        Question " Answer Comment   Diet type Carb Controlled    Carb diet selection: 75 gram carb/meal, 45 gram Carb evening snack        06/04/24 7690                     Estimated Needs:   Total Energy Estimated Needs (kCal): 1906 kCal  Method for Estimating Needs: 20 kcal/kg ABW  Total Protein Estimated Needs (g): 76 g  Method for Estimating Needs: 61-82 g (0.6-0.8 g/kg ABW) or as renal function allows  Total Fluid Estimated Needs (mL): 1906 mL  Method for Estimating Needs: 1 ml/kcal or per MD        Nutrition Diagnosis   Malnutrition Diagnosis  Patient has Malnutrition Diagnosis: Yes  Diagnosis Status: Ongoing  Malnutrition Diagnosis: Moderate malnutrition related to chronic disease or condition  As Evidenced by: >5% wt loss x 1 month, mild-moderate muscle losses    Nutrition Diagnosis  Patient has Nutrition Diagnosis: Yes  Diagnosis Status (1): New  Nutrition Diagnosis 1: Inadequate oral intake  Related to (1): altered mental status  As Evidenced by (1): consuming <50% of meals, need for oral nutritional supplements  Additional Nutrition Diagnosis: Diagnosis 2  Diagnosis Status (2): New  Nutrition Diagnosis 2: Altered nutrition related to laboratory values  Related to (2): endocrine dysfunction  As Evidenced by (2): blood glucose not consistently <180, need for carb controlled diet       Nutrition Interventions/Recommendations         Nutrition Prescription:  Individualized Nutrition Prescription Provided for : 75g carb controlled diet. Oral nutritional supplements        Nutrition Interventions:   Interventions: Meals and snacks, Medical food supplement  Goal: Consumes 3 meals per day  Medical Food Supplement: Commercial beverage  Goal: Glucerna TID (provides 220 kcal, 10 g protein per serving)    Collaboration and Referral of Nutrition Care: Collaboration by nutrition professional with other providers  Goal: Dr. Felipe, RN    Nutrition Education:      Not appropriate at this time    Nutrition Monitoring and Evaluation    Food/Nutrient Related History Monitoring  Monitoring and Evaluation Plan: Energy intake, Amount of food, Fluid intake  Energy Intake: Estimated energy intake  Criteria: Pt meets >75% of estimated energy needs  Fluid Intake: Estimated fluid intake  Criteria: Meets >75% of estimated fluid needs  Amount of Food: Estimated amout of food, Medical food intake  Criteria: Pt consumes >50% of meals and supplements    Body Composition/Growth/Weight History  Monitoring and Evaluation Plan: Weight  Weight: Measured weight  Criteria: Maintains stable weight    Biochemical Data, Medical Tests and Procedures  Monitoring and Evaluation Plan: Glucose/endocrine profile, Electrolyte/renal panel  Electrolyte and Renal Panel: Sodium, Potassium, Phosphorus  Criteria: Electrolytes WNL  Glucose/Endocrine Profile: Glucose, casual  Criteria: BG within desirable range         Time Spent/Follow-up Reminder:   Time Spent (min): 30 minutes  Last Date of Nutrition Visit: 06/12/24  Nutrition Follow-Up Needed?: 3-8 days

## 2024-06-12 NOTE — CARE PLAN
The patient's goals for the shift include no pain    The clinical goals for the shift include labs WNL    Over the shift, the patient did not make progress toward the following goals: none. Barriers to progression include n/a. Recommendations to address these barriers include continue current plan of care.

## 2024-06-13 LAB
ALBUMIN SERPL BCP-MCNC: 3.1 G/DL (ref 3.4–5)
ALP SERPL-CCNC: 163 U/L (ref 33–136)
ALT SERPL W P-5'-P-CCNC: 14 U/L (ref 7–45)
ANION GAP SERPL CALC-SCNC: 12 MMOL/L (ref 10–20)
AST SERPL W P-5'-P-CCNC: 16 U/L (ref 9–39)
BILIRUB SERPL-MCNC: 0.3 MG/DL (ref 0–1.2)
BUN SERPL-MCNC: 48 MG/DL (ref 6–23)
CALCIUM SERPL-MCNC: 9 MG/DL (ref 8.6–10.3)
CHLORIDE SERPL-SCNC: 109 MMOL/L (ref 98–107)
CO2 SERPL-SCNC: 30 MMOL/L (ref 21–32)
CORTIS 1H P CHAL SERPL-MCNC: 13.6 UG/DL
CORTIS 30M P CHAL SERPL-MCNC: 12.2 UG/DL
CORTIS BS SERPL-MCNC: 7.3 UG/DL
CREAT SERPL-MCNC: 1.63 MG/DL (ref 0.5–1.05)
EGFRCR SERPLBLD CKD-EPI 2021: 36 ML/MIN/1.73M*2
GLUCOSE BLD MANUAL STRIP-MCNC: 106 MG/DL (ref 74–99)
GLUCOSE BLD MANUAL STRIP-MCNC: 110 MG/DL (ref 74–99)
GLUCOSE BLD MANUAL STRIP-MCNC: 126 MG/DL (ref 74–99)
GLUCOSE BLD MANUAL STRIP-MCNC: 160 MG/DL (ref 74–99)
GLUCOSE SERPL-MCNC: 147 MG/DL (ref 74–99)
HOLD SPECIMEN: NORMAL
HOLD SPECIMEN: NORMAL
POTASSIUM SERPL-SCNC: 3.5 MMOL/L (ref 3.5–5.3)
PROT SERPL-MCNC: 5.9 G/DL (ref 6.4–8.2)
SODIUM SERPL-SCNC: 147 MMOL/L (ref 136–145)

## 2024-06-13 PROCEDURE — 2500000004 HC RX 250 GENERAL PHARMACY W/ HCPCS (ALT 636 FOR OP/ED): Performed by: STUDENT IN AN ORGANIZED HEALTH CARE EDUCATION/TRAINING PROGRAM

## 2024-06-13 PROCEDURE — 2500000001 HC RX 250 WO HCPCS SELF ADMINISTERED DRUGS (ALT 637 FOR MEDICARE OP): Performed by: STUDENT IN AN ORGANIZED HEALTH CARE EDUCATION/TRAINING PROGRAM

## 2024-06-13 PROCEDURE — 82947 ASSAY GLUCOSE BLOOD QUANT: CPT | Mod: 91

## 2024-06-13 PROCEDURE — 36415 COLL VENOUS BLD VENIPUNCTURE: CPT | Performed by: HOSPITALIST

## 2024-06-13 PROCEDURE — 2500000002 HC RX 250 W HCPCS SELF ADMINISTERED DRUGS (ALT 637 FOR MEDICARE OP, ALT 636 FOR OP/ED): Performed by: HOSPITALIST

## 2024-06-13 PROCEDURE — 82533 TOTAL CORTISOL: CPT | Mod: ELYLAB | Performed by: HOSPITALIST

## 2024-06-13 PROCEDURE — 97165 OT EVAL LOW COMPLEX 30 MIN: CPT | Mod: GO

## 2024-06-13 PROCEDURE — 1210000001 HC SEMI-PRIVATE ROOM DAILY

## 2024-06-13 PROCEDURE — 84075 ASSAY ALKALINE PHOSPHATASE: CPT | Performed by: STUDENT IN AN ORGANIZED HEALTH CARE EDUCATION/TRAINING PROGRAM

## 2024-06-13 PROCEDURE — 2500000004 HC RX 250 GENERAL PHARMACY W/ HCPCS (ALT 636 FOR OP/ED): Performed by: INTERNAL MEDICINE

## 2024-06-13 PROCEDURE — 97161 PT EVAL LOW COMPLEX 20 MIN: CPT | Mod: GP | Performed by: PHYSICAL THERAPIST

## 2024-06-13 PROCEDURE — 99232 SBSQ HOSP IP/OBS MODERATE 35: CPT | Performed by: HOSPITALIST

## 2024-06-13 PROCEDURE — 36415 COLL VENOUS BLD VENIPUNCTURE: CPT | Performed by: STUDENT IN AN ORGANIZED HEALTH CARE EDUCATION/TRAINING PROGRAM

## 2024-06-13 PROCEDURE — 99232 SBSQ HOSP IP/OBS MODERATE 35: CPT | Performed by: PSYCHIATRY & NEUROLOGY

## 2024-06-13 PROCEDURE — 2500000002 HC RX 250 W HCPCS SELF ADMINISTERED DRUGS (ALT 637 FOR MEDICARE OP, ALT 636 FOR OP/ED): Mod: MUE | Performed by: PSYCHIATRY & NEUROLOGY

## 2024-06-13 PROCEDURE — 80400 ACTH STIMULATION PANEL: CPT | Mod: ELYLAB | Performed by: HOSPITALIST

## 2024-06-13 PROCEDURE — 2500000004 HC RX 250 GENERAL PHARMACY W/ HCPCS (ALT 636 FOR OP/ED): Performed by: HOSPITALIST

## 2024-06-13 RX ORDER — PALIPERIDONE 1.5 MG/1
4.5 TABLET, EXTENDED RELEASE ORAL DAILY
Status: DISCONTINUED | OUTPATIENT
Start: 2024-06-13 | End: 2024-06-14

## 2024-06-13 RX ORDER — DEXTROSE MONOHYDRATE 50 MG/ML
100 INJECTION, SOLUTION INTRAVENOUS CONTINUOUS
Status: ACTIVE | OUTPATIENT
Start: 2024-06-13 | End: 2024-06-14

## 2024-06-13 ASSESSMENT — COGNITIVE AND FUNCTIONAL STATUS - GENERAL
DRESSING REGULAR UPPER BODY CLOTHING: A LITTLE
DAILY ACTIVITIY SCORE: 13
TOILETING: TOTAL
WALKING IN HOSPITAL ROOM: TOTAL
EATING MEALS: A LITTLE
DRESSING REGULAR LOWER BODY CLOTHING: TOTAL
PERSONAL GROOMING: A LITTLE
MOVING FROM LYING ON BACK TO SITTING ON SIDE OF FLAT BED WITH BEDRAILS: A LITTLE
STANDING UP FROM CHAIR USING ARMS: A LOT
CLIMB 3 TO 5 STEPS WITH RAILING: TOTAL
HELP NEEDED FOR BATHING: A LOT
TURNING FROM BACK TO SIDE WHILE IN FLAT BAD: A LITTLE
MOBILITY SCORE: 11
MOVING TO AND FROM BED TO CHAIR: TOTAL

## 2024-06-13 ASSESSMENT — PAIN SCALES - GENERAL
PAINLEVEL_OUTOF10: 7
PAINLEVEL_OUTOF10: 0 - NO PAIN

## 2024-06-13 ASSESSMENT — ACTIVITIES OF DAILY LIVING (ADL): BATHING_ASSISTANCE: MAXIMAL

## 2024-06-13 ASSESSMENT — PAIN - FUNCTIONAL ASSESSMENT
PAIN_FUNCTIONAL_ASSESSMENT: 0-10
PAIN_FUNCTIONAL_ASSESSMENT: 0-10

## 2024-06-13 ASSESSMENT — PAIN SCALES - WONG BAKER
WONGBAKER_NUMERICALRESPONSE: HURTS WHOLE LOT
WONGBAKER_NUMERICALRESPONSE: NO HURT

## 2024-06-13 NOTE — PROGRESS NOTES
Renal Progress Note  Assessment:  63 y.o. female with history s/f T2DM, SLE, COPD, adrenal insufficiency, Raynauds, RA, CKD stage III, depression, pHTN, pA.fib, gout, fibromyalgia, HTN and HLD who presented for abnormal labs.     JED on CKD stage III: ? If in setting of CRS as function improving w/ diuresis, CKD risk factors 2/2 T2DM, HTN, unlikely in setting of SLE, baseline Scr ~1.3-1.4 w/ eGFR low/mid 40s  Fluid overload: improved   Hypomagnesemia   Hypernatremia: Again with calculated water deficit 2 L  Hypokalemia: corrected      Plan:  -Resume D5 for 1 L    admit Date: 6/4/2024                Interval History: Patient seen and examined uneventful night better mentation better appetite but still deficient from baseline  Patient does have adrenal insufficiency the electrolyte imbalance related to adrenal insufficiency usually is hyperkalemia and hyponatremia which is not the case and outpatient         Subjective:   Admit Date: 6/4/2024    Interval History: Patient seen and examined uneventful night continue to be more alert follow command in no apparent pain or distress      Medications:   Scheduled Meds:apixaban, 2.5 mg, oral, BID  atorvastatin, 40 mg, oral, Nightly  fludrocortisone, 0.1 mg, oral, Every other day  tiotropium, 2 puff, inhalation, Daily   And  fluticasone furoate-vilanteroL, 1 puff, inhalation, Daily  folic acid, 1 mg, oral, Daily  insulin glargine, 10 Units, subcutaneous, q AM  insulin lispro, 0-10 Units, subcutaneous, TID  levETIRAcetam, 500 mg, intravenous, q12h  melatonin, 5 mg, oral, Nightly  metoprolol tartrate, 25 mg, oral, BID  mycophenolate, 1,000 mg, oral, BID  paliperidone, 4.5 mg, oral, Daily  pantoprazole, 40 mg, oral, Daily  thiamine, 100 mg, oral, Daily  [Held by provider] torsemide, 40 mg, oral, Daily      Continuous Infusions:     CBC:   Lab Results   Component Value Date    HGB 8.5 (L) 06/12/2024    HGB 7.9 (L) 06/11/2024    WBC 5.8 06/12/2024    WBC 5.0 06/11/2024    PLT 94  "(L) 06/12/2024    PLT 86 (L) 06/11/2024      Anemia:  No results found for: \"FERRITIN\", \"IRON\", \"TIBC\"   BMP:    Lab Results   Component Value Date     (H) 06/13/2024     06/12/2024    K 3.5 06/13/2024    K 3.8 06/12/2024     (H) 06/13/2024     06/12/2024    CO2 30 06/13/2024    CO2 29 06/12/2024    BUN 48 (H) 06/13/2024    BUN 45 (H) 06/12/2024    CREATININE 1.63 (H) 06/13/2024    CREATININE 1.72 (H) 06/12/2024      Bone disease: No results found for: \"PHOS\", \"PTH\", \"VITD25\"   Urinalysis:  No results found for: \"KRYSTAL\", \"PROTUR\", \"GLUCOSEU\", \"BLOODU\", \"KETONESU\", \"BILIRUBINU\", \"NITRITEU\", \"LEUKOCYTESU\", \"UTPCR\"     Objective:   Vitals: /69 (BP Location: Right arm, Patient Position: Lying)   Pulse 60   Temp 35.2 °C (95.4 °F) (Temporal)   Resp 16   Ht 1.778 m (5' 10\")   Wt 95.3 kg (210 lb)   LMP  (LMP Unknown)   SpO2 100%   BMI 30.13 kg/m²    Wt Readings from Last 3 Encounters:   06/04/24 95.3 kg (210 lb)   05/13/24 102 kg (224 lb 13.9 oz)   04/24/24 90.9 kg (200 lb 6.4 oz)      24HR INTAKE/OUTPUT:    Intake/Output Summary (Last 24 hours) at 6/13/2024 1405  Last data filed at 6/12/2024 2025  Gross per 24 hour   Intake 100 ml   Output --   Net 100 ml     Admission weight:  Weight: 95.3 kg (210 lb)      Constitutional:  Alert, awake, no apparent distress   Skin:normal, no rash  HEENT:sclera anicteric.  Head atraumatic normocephalic  Neck:supple with no thyromegally  Cardiovascular:  S1, S2 without m/r/g   Respiratory:  CTA B without w/r/r   Abdomen: +bs, soft, nt  Ext: no LE edema  Musculoskeletal:Intact  Neuro:Alert and oriented with no deficit      Electronically signed by Sully Gutiérrez MD on 6/13/2024 at 2:05 PM            "

## 2024-06-13 NOTE — CARE PLAN
The patient's goals for the shift include no pain    The clinical goals for the shift include Labs WNL      Problem: Nutrition  Goal: Less than 5 days NPO/clear liquids  Outcome: Progressing  Goal: Oral intake greater than 50%  Outcome: Progressing  Goal: Oral intake greater 75%  Outcome: Progressing  Goal: Consume prescribed supplement  Outcome: Progressing  Goal: Adequate PO fluid intake  Outcome: Progressing  Goal: Nutrition support goals are met within 48 hrs  Outcome: Progressing  Goal: Nutrition support is meeting 75% of nutrient needs  Outcome: Progressing  Goal: Tube feed tolerance  Outcome: Progressing  Goal: BG  mg/dL  Outcome: Progressing  Goal: Lab values WNL  Outcome: Progressing  Goal: Electrolytes WNL  Outcome: Progressing  Goal: Promote healing  Outcome: Progressing  Goal: Maintain stable weight  Outcome: Progressing  Goal: Reduce weight from edema/fluid  Outcome: Progressing  Goal: Gradual weight gain  Outcome: Progressing  Goal: Improve ostomy output  Outcome: Progressing     Problem: Discharge Planning  Goal: Discharge to home or other facility with appropriate resources  Outcome: Progressing     Problem: Chronic Conditions and Co-morbidities  Goal: Patient's chronic conditions and co-morbidity symptoms are monitored and maintained or improved  Outcome: Progressing     Problem: Pain  Goal: Turns in bed with improved pain control throughout the shift  Outcome: Progressing  Goal: Walks with improved pain control throughout the shift  Outcome: Progressing  Goal: Performs ADL's with improved pain control throughout shift  Outcome: Progressing  Goal: Participates in PT with improved pain control throughout the shift  Outcome: Progressing  Goal: Free from opioid side effects throughout the shift  Outcome: Progressing  Goal: Free from acute confusion related to pain meds throughout the shift  Outcome: Progressing     Problem: Psychosocial Needs  Goal: Demonstrates ability to cope with  hospitalization/illness  Outcome: Progressing  Goal: Collaborate with me, my family, and caregiver to identify my specific goals  Outcome: Progressing     Problem: Discharge Barriers  Goal: My discharge needs are met  Outcome: Progressing     Problem: Skin  Goal: Decreased wound size/increased tissue granulation at next dressing change  Outcome: Progressing  Flowsheets (Taken 6/13/2024 0021)  Decreased wound size/increased tissue granulation at next dressing change: Promote sleep for wound healing  Goal: Participates in plan/prevention/treatment measures  Outcome: Progressing  Flowsheets (Taken 6/13/2024 0021)  Participates in plan/prevention/treatment measures: Discuss with provider PT/OT consult  Goal: Promote/optimize nutrition  Outcome: Progressing  Goal: Promote skin healing  Outcome: Progressing  Flowsheets (Taken 6/13/2024 0021)  Promote skin healing:   Turn/reposition every 2 hours/use positioning/transfer devices   Protective dressings over bony prominences     Problem: Diabetes  Goal: Decrease in ketones present in urine by end of shift  Outcome: Progressing  Goal: Maintain electrolyte levels within acceptable range throughout shift  Outcome: Progressing  Goal: Maintain glucose levels >70mg/dl to <250mg/dl throughout shift  Outcome: Progressing  Goal: No changes in neurological exam by end of shift  Outcome: Progressing  Goal: Learn about and adhere to nutrition recommendations by end of shift  Outcome: Progressing  Goal: Vital signs within normal range for age by end of shift  Outcome: Progressing  Goal: Increase self care and/or family involovement by end of shift  Outcome: Progressing  Goal: Receive DSME education by end of shift  Outcome: Progressing     Problem: Fall/Injury  Goal: Not fall by end of shift  Outcome: Progressing  Goal: Be free from injury by end of the shift  Outcome: Progressing  Goal: Verbalize understanding of personal risk factors for fall in the hospital  Outcome:  Progressing  Goal: Verbalize understanding of risk factor reduction measures to prevent injury from fall in the home  Outcome: Progressing  Goal: Use assistive devices by end of the shift  Outcome: Progressing  Goal: Pace activities to prevent fatigue by end of the shift  Outcome: Progressing

## 2024-06-13 NOTE — PROGRESS NOTES
Occupational Therapy    Evaluation    Patient Name: Bing Holliday  MRN: [id number]  Today's Date: 6/13/2024  Time Calculation  Start Time: 1033  Stop Time: 1047  Time Calculation (min): 14 min      Assessment:  OT Assessment: Limited by balance deficits, decreased strength/endurance, impaired cognition.  End of Session Communication: Bedside nurse  End of Session Patient Position: Bed, 3 rail up, Alarm on (seizure pads on bed)  OT Assessment Results: Decreased ADL status, Decreased upper extremity strength, Decreased safe judgment during ADL, Decreased cognition, Decreased endurance, Decreased functional mobility, Decreased fine motor control, Decreased gross motor control  Plan:  Treatment Interventions: ADL retraining, Functional transfer training, UE strengthening/ROM, Endurance training, Cognitive reorientation, Patient/family training  OT Frequency: 2 times per week  OT Discharge Recommendations: Moderate intensity level of continued care  OT - OK to Discharge: Yes (Once medically appropriate.)  Treatment Interventions: ADL retraining, Functional transfer training, UE strengthening/ROM, Endurance training, Cognitive reorientation, Patient/family training    Subjective   Current Problem:  1. Cellulitis of leg without foot        2. Acute kidney injury (CMS-HCC)        3. Open wound of lower extremity, unspecified laterality, initial encounter        4. Anemia, unspecified type        5. Hypomagnesemia        6. Dehydration          General:  General  Reason for Referral: ADLs  Referred By: Dr. Felipe  Past Medical History Relevant to Rehab: COPD, HLD, HTN, A fib, Depression, Anxiety, CKD, Anemia, DM, Hyperparathyroidism, Lupus, Raynaud's, RA, Cardiomyopathy, Gout, Fibromyalgia, MARIAH, Myelodisplastic Syndrome, PPM (5/17/24 with Dr. Rodriges)  Prior to Session Communication: Bedside nurse (Cleared for therapy evaluation by RN.)  Patient Position Received: Bed, 3 rail up, Alarm on (Seizure pads on bed rails.)  General  Comment: Patient sent from facility for abnormal labs. Patient also c/o B/L LE swelling and blisters. Patient developed confusion this admission. + encephalopathy with psychosis. Nephrology, ID, Neuro, Podiatry, Endocrinology and Psych consulted. CT head: (-) acute, L occipital lobe encephalomalacia. CXR: (-). B/L ankle/tib fib x ray: diffuse edema of B/L legs without foreign body or gas, (-) OM, severe B/L knee OA, diffuse ankylosis of L hindfoot and midfoot articulations. CT chest abd pelvis: findings in chest account for sepsis, pulm HTN, age indeterminate but subacute nondisplaced fx R iliac crest, severe deg changes L spine, unchanged L comp deformity. Dx: Cellulitis of leg.  Precautions:  Medical Precautions: Fall precautions, Seizure precautions  Precautions Comment: Per Podiatry -> WBAT.     Pain:  Pain Assessment  Pain Assessment: 0-10  Pain Score: 0 - No pain    Objective   Cognition:  Overall Cognitive Status:  (Confused. Flat affect. Oriented to self and place. Stated the year as 2055 and unable to recall the month. Not oriented to situation.)  Processing Speed: Delayed           Home Living:  Home Living Comments: Patient admitted from [place] ([facility]  Prior Function:  Prior Function Comments: Patient reports ambulating with a FWW and completing ADLs independently.     ADL:  Eating Assistance:  (Setup/S)  Grooming Assistance: Minimal  Bathing Assistance: Maximal  UE Dressing Assistance: Moderate  LE Dressing Assistance: Total  Toileting Assistance with Device: Total     Bed Mobility/Transfers: Bed Mobility 1  Bed Mobility 1: Supine to sitting, Sitting to supine  Bed Mobility Comments 1: HOB elevated. Supine to sit: min A, sit to supine: mod A level.    Transfer 1  Technique 1: Sit to stand, Stand to sit  Trials/Comments 1: Patient completed sit <> stand at EOB with a FWW and min A x2. Patient unable to take any steps at this time.     Standing Balance:  Dynamic Standing Balance  Dynamic  Standing-Comments: Poor +      Strength:  Strength Comments: B/L UE MMT grossly 3 to 3+/5.     Extremities: RUE   RUE :  (R UE AROM shoulder flex ~90 degrees, elbow flex/ext grossly WFL, joint deformities of all digits.) and LUE   LUE:  (L UE AROM shoulder flex ~90 degrees, elbow flex/ext grossly WFL, joint deformities of all digits.)    Outcome Measures:[facility] Daily Activity  Putting on and taking off regular lower body clothing: Total  Bathing (including washing, rinsing, drying): A lot  Putting on and taking off regular upper body clothing: A little  Toileting, which includes using toilet, bedpan or urinal: Total  Taking care of personal grooming such as brushing teeth: A little  Eating Meals: A little  Daily Activity - Total Score: 13    Education Documentation  Body Mechanics, taught by Fifi Escobedo OT at 6/13/2024  2:21 PM.  Learner: Patient  Readiness: Nonacceptance  Method: Explanation  Response: Needs Reinforcement  Comment: Walker management    EDUCATION:  Education  Individual(s) Educated: Patient  Education Provided: POC discussed and agreed upon, Risk and benefits of OT discussed with patient or other, Fall precautons  Patient Response to Education:  (Recommend continued education for increased carryover.)    Goals:  Encounter Problems       Encounter Problems (Active)       OT Goals       Patient will complete household distance functional mobility with a FWW at CGA level.  (Progressing)       Start:  06/13/24    Expected End:  06/27/24            Patient will complete functional transfers with a FWW at CGA level.  (Progressing)       Start:  06/13/24    Expected End:  06/27/24            Patient will complete lower body dressing at a CGA level.  (Progressing)       Start:  06/13/24    Expected End:  06/27/24            Patient will tolerate standing greater than 3 minutes during functional tasks. (Progressing)       Start:  06/13/24    Expected End:  06/27/24            Patient will demonstrate  fair/fair + standing balance during functional tasks.  (Progressing)       Start:  06/13/24    Expected End:  06/27/24

## 2024-06-13 NOTE — DOCUMENTATION CLARIFICATION NOTE
"    PATIENT:               LISBET GUTIERRES  ACCT #:                  [id number]  MRN:                       [id number]  :                       1960  ADMIT DATE:       2024 12:55 PM  DISCH DATE:  RESPONDING PROVIDER #:        [id number]          PROVIDER RESPONSE TEXT:    Multifactorial encephalopathy 2/2 toxic and metabolic reasons from drugs and lupus flare    CDI QUERY TEXT:    Clarification    Instruction:    Based on your assessment of the patient and the clinical information, please provide the requested documentation by clicking on the appropriate radio button and enter any additional information if prompted.    Question: Please further clarify the most likely etiology of the altered mental status as      When answering this query, please exercise your independent professional judgment. The fact that a question is being asked, does not imply that any particular answer is desired or expected.    The patient's clinical indicators include:  Clinical Information: 62 yo female to ED for abnormal labs and cellulitis of leg.    Clinical Indicators: 24 sodium 146-WBC 6.3- creatinine 2.24. ID progress note 6/10 notes \"new onset altered mentation that started after admission, could be related to IV cefepime\".  6/10/24 Psychiatry note documents \"Patient current presentation appears secondary to metabolic encephalopathy as opposed to primary psychiatric disorder\".  24 progress note documents \"\"Worsening confusion and psychoses': neuro and psych consulted -unclear etiology at this point, possibly lupus related?\".  24 progress note documents  \"Encephalopathy w/psychosis in the setting of cefepime and steroid use-improving with steroid reduction and antibiotic changes\".  Progress notes  notes- \"I spoken to her son who is also the POA and patient has experienced previous episodes of psychosis lasting several days to up to a month secondary to lupus\". Psychiatry note 6/10/24 notes \"Encephalopathy, " "likely secondary to infection and steroid use\".    Treatment: Change in antibiotic to Rocephin, Zyprexa and psychiatry to follow    Risk Factors: Lupus, Cellulitis, steroids,  and IV cefepime  Options provided:  -- Metabolic encephalopathy 2/2 cellulitis, Lupus  -- Toxic encephalopathy 2/2 Steroids and antibiotics  -- Multifactorial encephalopathy 2/2 toxic and metabolic reasons from drugs and lupus flare  -- Other - I will add my own diagnosis  -- Refer to Clinical Documentation Reviewer    Query created by: Kesha Calvo on 6/13/2024 2:47 PM      Electronically signed by:  EUNICE SOTO MD 6/13/2024 3:45 PM          "

## 2024-06-13 NOTE — PROGRESS NOTES
"Bing Holliday is a 63 y.o. female on day 9 of admission presenting with cellulitis        Subjective  mental status slowly improving     Objective     Last Recorded Vitals  /69 (BP Location: Right arm, Patient Position: Lying)   Pulse 60   Temp 35.2 °C (95.4 °F) (Temporal)   Resp 16   Ht 1.778 m (5' 10\")   Wt 95.3 kg (210 lb)   LMP  (LMP Unknown)   SpO2 100%   BMI 30.13 kg/m²      Intake/Output last 3 Shifts:    Intake/Output Summary (Last 24 hours) at 6/13/2024 0954  Last data filed at 6/12/2024 2025  Gross per 24 hour   Intake 100 ml   Output --   Net 100 ml       Physical Exam   Gen: Aox2  HEENT: EOM, MMM  CV: RRR, no murmurs rubs or gallops  Resp: coarse rhonchi   Abdomen: soft, NT,+BS  LE: No edema      Relevant Results  Lab Results   Component Value Date    WBC 5.8 06/12/2024    HGB 8.5 (L) 06/12/2024    HCT 27.9 (L) 06/12/2024    MCV 98 06/12/2024    PLT 94 (L) 06/12/2024     Lab Results   Component Value Date    GLUCOSE 147 (H) 06/13/2024    CALCIUM 9.0 06/13/2024     (H) 06/13/2024    K 3.5 06/13/2024    CO2 30 06/13/2024     (H) 06/13/2024    BUN 48 (H) 06/13/2024    CREATININE 1.63 (H) 06/13/2024     Lab Results   Component Value Date    HGBA1C 6.8 (H) 06/04/2024         Assessment/Plan  63 year old female with history of Lupus, adrenal insuffiencey, lupus,  presented from living facility with cellulitis and worsening confusion         Worsening confusion and pyschosis: neuro and psych consulted  -likely psychosis, psych recommending IP therapy, started on invega     Adrenal insufficiency: continue florinef, will stop hydrocortisone, stim test today  endocrine following    JED on CKD stage 3 with hypernatremia: nephrology following,  -fluids adjusted per nephrology recs, improving     Cellulitis: resolved, stop IV Abx, ID signed off     Hx of PAF: on eliquis    DMII: resumed insulin therapy as PO intake improved     DVT ppx: kehinde Felipe MD     "

## 2024-06-13 NOTE — PROGRESS NOTES
Bing Holliday is a 63 y.o. female on day 9 of admission had some difficulty sleeping last night and did experience hallucinations last night.  Also in the questioning her it was not clear whether they were hypnagogic hypnopompic hallucinations or if the continuation of the daytime visual hallucination she was experiencing.  She described her mood as okay she has been taking her medications.  Invega was well-tolerated and the dose would be adjusted to 4.5 mg.  I discussed with her son that to overcome episodes of her not taking antipsychotic medication in future and psychosis returning full force especially during flareups long-acting injection would be preferred.  Invega is being offered to her as she had taken risperidone without side effects in the past.  Patient is tolerating medications well.   Labs Reviewed.  Vitals Reviewed.   Nursing Notes Reviewed.   No EPS, TD, vitals stable.      MSE: Patient was alert, but was not oriented to time, place, person and situation.  She was provided orientation.  Patient appears well groomed and clad in climate appropriate clothes. Patient is cooperative on approach.   Recent and remote memory poor. Memory registration and recall poor. Attention and concentration restricted. Speech low in volume and slow in rate.  Fair eye contact. Thought process poverty of thoughts. Impaired associations. Limited fund of knowledge. Mood ok affect flat. Patient did not endorse any delusions. Patient has been experiencing both auditory visual hallucinations. Patient has denied any active suicidal  ideations and is future oriented.  Patient has limited insight, limited judgment and fair impulse control.      Musculoskeletal: Patient bedbound and gait could not be assessed, no Parkinsonism, no Dystonia, no Akathisia, no TD. Psychomotor activity within normal limits.     Diagnosis: Psychosis     Assessment and Plan:      Increase Invega 4.5 mg once a day  Plan to offer long-acting injection  "depending on her response to Invega and also monitoring for side effects.    Patient is not cleared from psychiatry standpoint  Psychiatry will follow while patient is in the hospital  Continue with current treatment and medication adjustments. Patient is agreeable with continued medication management and adjustments discussed      Last Recorded Vitals  /69 (BP Location: Right arm, Patient Position: Lying)   Pulse 60   Temp 35.2 °C (95.4 °F) (Temporal)   Resp 16   Ht 1.778 m (5' 10\")   Wt 95.3 kg (210 lb)   LMP  (LMP Unknown)   SpO2 100%   BMI 30.13 kg/m²      Recent Results (from the past 24 hour(s))   POCT GLUCOSE    Collection Time: 06/12/24 11:34 AM   Result Value Ref Range    POCT Glucose 109 (H) 74 - 99 mg/dL   POCT GLUCOSE    Collection Time: 06/12/24  4:28 PM   Result Value Ref Range    POCT Glucose 134 (H) 74 - 99 mg/dL   POCT GLUCOSE    Collection Time: 06/12/24  8:35 PM   Result Value Ref Range    POCT Glucose 229 (H) 74 - 99 mg/dL   Comprehensive Metabolic Panel    Collection Time: 06/13/24  5:55 AM   Result Value Ref Range    Glucose 147 (H) 74 - 99 mg/dL    Sodium 147 (H) 136 - 145 mmol/L    Potassium 3.5 3.5 - 5.3 mmol/L    Chloride 109 (H) 98 - 107 mmol/L    Bicarbonate 30 21 - 32 mmol/L    Anion Gap 12 10 - 20 mmol/L    Urea Nitrogen 48 (H) 6 - 23 mg/dL    Creatinine 1.63 (H) 0.50 - 1.05 mg/dL    eGFR 36 (L) >60 mL/min/1.73m*2    Calcium 9.0 8.6 - 10.3 mg/dL    Albumin 3.1 (L) 3.4 - 5.0 g/dL    Alkaline Phosphatase 163 (H) 33 - 136 U/L    Total Protein 5.9 (L) 6.4 - 8.2 g/dL    AST 16 9 - 39 U/L    Bilirubin, Total 0.3 0.0 - 1.2 mg/dL    ALT 14 7 - 45 U/L   POCT GLUCOSE    Collection Time: 06/13/24  7:15 AM   Result Value Ref Range    POCT Glucose 106 (H) 74 - 99 mg/dL          Current Facility-Administered Medications:     acetaminophen (Tylenol) oral liquid 650 mg, 650 mg, oral, q4h PRN **OR** acetaminophen (Tylenol) tablet 650 mg, 650 mg, oral, q4h PRN, Vivek Orozco MD, 650 " mg at 06/06/24 2059    apixaban (Eliquis) tablet 2.5 mg, 2.5 mg, oral, BID, Vivek Orozco MD, 2.5 mg at 06/12/24 2025    atorvastatin (Lipitor) tablet 40 mg, 40 mg, oral, Nightly, Vivek Orozco MD, 40 mg at 06/12/24 2027    cosyntropin (Cortrosyn) injection 250 mcg, 250 mcg, intravenous, Once, Kip Felipe MD    dextrose 50 % injection 12.5 g, 12.5 g, intravenous, q15 min PRN, Vivek Orozco MD, 12.5 g at 06/08/24 0913    dextrose 50 % injection 25 g, 25 g, intravenous, q15 min PRN, Vivek Orozco MD    fludrocortisone (Florinef) tablet 0.1 mg, 0.1 mg, oral, Every other day, Vivek Orozco MD, 0.1 mg at 06/11/24 0911    tiotropium (Spiriva Respimat) 2.5 mcg/actuation inhaler 2 puff, 2 puff, inhalation, Daily, 2 puff at 06/13/24 0604 **AND** fluticasone furoate-vilanteroL (Breo Ellipta) 200-25 mcg/dose inhaler 1 puff, 1 puff, inhalation, Daily, Vivek Orozco MD, 1 puff at 06/13/24 0604    folic acid (Folvite) tablet 1 mg, 1 mg, oral, Daily, Vivek Orozco MD, 1 mg at 06/12/24 0811    glucagon (Glucagen) injection 1 mg, 1 mg, intramuscular, q15 min PRN, Vivek Orozco MD    glucagon (Glucagen) injection 1 mg, 1 mg, intramuscular, q15 min PRN, Vivek Orozco MD    HYDROcodone-acetaminophen (Norco) 5-325 mg per tablet 1 tablet, 1 tablet, oral, q6h PRN, Cheko Bonilla MD, 1 tablet at 06/08/24 0535    insulin glargine (Lantus) injection 10 Units, 10 Units, subcutaneous, q AM, Kip Felipe MD, 10 Units at 06/07/24 1055    insulin lispro (HumaLOG) injection 0-10 Units, 0-10 Units, subcutaneous, TID, Vivek Orozco MD, 4 Units at 06/12/24 0812    levETIRAcetam (Keppra)  mg, 500 mg, intravenous, q12h, Neno Rodarte MD, Stopped at 06/12/24 2040    meclizine (Antivert) tablet 25 mg, 25 mg, oral, q12h PRN, Vivek Orozco MD    melatonin tablet 5 mg, 5 mg, oral, Nightly, Vivek Orozco MD, 5 mg at 06/12/24 2025    metoprolol  tartrate (Lopressor) tablet 25 mg, 25 mg, oral, BID, Vivek Orozco MD, 25 mg at 06/12/24 2027    morphine injection 1 mg, 1 mg, intravenous, q4h PRN, Neno Rodarte MD, 1 mg at 06/13/24 0536    mycophenolate (Cellcept) capsule 1,000 mg, 1,000 mg, oral, BID, Vivek Orozco MD, 1,000 mg at 06/12/24 2027    OLANZapine (ZyPREXA) injection 5 mg, 5 mg, intramuscular, q6h PRN, JIM Garcia-CNP    paliperidone (Invega) 24 hr tablet 4.5 mg, 4.5 mg, oral, Daily, Jaiden Cabral MD    pantoprazole (ProtoNix) EC tablet 40 mg, 40 mg, oral, Daily, Vivek Orozco MD, 40 mg at 06/13/24 0537    thiamine (Vitamin B-1) tablet 100 mg, 100 mg, oral, Daily, Vivek Orozco MD, 100 mg at 06/12/24 0811    [Held by provider] torsemide (Demadex) tablet 40 mg, 40 mg, oral, Daily, Sully Gutiérrez MD, 40 mg at 06/08/24 0928       Jaiden Cabral MD

## 2024-06-13 NOTE — PROGRESS NOTES
06/13/24 1556   Current Planned Discharge Disposition   Current Planned Discharge Disposition Inter  ([facility])     Updated clinical information sent to the facility. ADOD not yet determined. Psych continues to follow and has not yet cleared pt. Pt accepted to facility with payor source being Medicaid pending. Pt may have skilled needs at discharge. CTT will continue to follow.

## 2024-06-13 NOTE — PROGRESS NOTES
Physical Therapy    Physical Therapy Evaluation    Patient Name: Bing Holliday  MRN: [id number]  Today's Date: 6/13/2024   Time Calculation  Start Time: 1034  Stop Time: 1047  Time Calculation (min): 13 min  1105/1105-A    Assessment/Plan   PT Assessment  PT Assessment Results: Decreased strength, Decreased endurance, Impaired balance, Decreased mobility, Impaired judgement, Decreased safety awareness  Rehab Prognosis: Fair  Evaluation/Treatment Tolerance: Patient limited by fatigue  Medical Staff Made Aware: Yes  Strengths: Housing layout  Barriers to Participation: Comorbidities  End of Session Communication: Bedside nurse  End of Session Patient Position: Bed, 3 rail up, Alarm on (seizure pads on bed)  IP OR SWING BED PT PLAN  Inpatient or Swing Bed: Inpatient  PT Plan  Treatment/Interventions: Bed mobility, Transfer training, Gait training, Balance training, Strengthening, Endurance training, Therapeutic exercise  PT Plan: Ongoing PT  PT Frequency: 3 times per week  PT Discharge Recommendations: Moderate intensity level of continued care  PT Recommended Transfer Status: Assist x2, Assistive device  Physical Therapy eval completed per MD requisition. P.T. recommendations as outlined above. Recommend D/C from acute care when medically appropriate as deemed by medical staff.    Subjective       General Visit Information:  General  Reason for Referral: impaired mobility  Referred By: Dr. Felipe (PT/OT 6/12)  Past Medical History Relevant to Rehab: includes: COPD, HLD, HTN, A fib, Depression, Anxiety, CKD, Anemia, DM, Hyperparathyroidism, Lupus, Raynaud's, RA, Cardiomyopathy, Gout, Fibromyalgia, MARIAH, Myelodisplastic Syndrome, PPM (5/17/24 with Dr. Rodriges)  Family/Caregiver Present: No  Prior to Session Communication: Bedside nurse  Patient Position Received: Bed, 3 rail up, Alarm on (seizure pads on bed)  Preferred Learning Style: auditory, verbal  General Comment: Pt. is a 62yo who [id number] to [facility] on 6/4/2024 from  facility for abnormal labs. Patient also c/o B/L LE swelling and blisters. Patient developed confusion this admission. + encephalopathy with psychosis. Nephrology, ID, Neuro, Podiatry, Endocrinology and Psych consulted.   CT head: (-) acute, L occipital lobe encephalomalacia.   CXR: (-).   B/L ankle/tib fib x ray: diffuse edema of B/L legs without foreign body or gas, (-) OM, severe B/L knee OA, diffuse ankylosis of L hindfoot and midfoot articulations.   CT chest abd pelvis: findings in chest account for sepsis, pulm HTN, age indeterminate but subacute nondisplaced fx R iliac crest, severe deg changes L spine, unchanged L comp deformity.   Dx: Cellulitis of leg.    Home Living:  Home Living  Home Living Comments: Pt. was admitted from [facility] - uncertain if SNF or LTC.    Prior Level of Function:  Prior Function Per Pt/Caregiver Report  Prior Function Comments: Pt. is a questionable historian. Per Pt., she amb wtih FWW and was I with dessing and bathing PTA Facility provided IADLs.    Precautions:  Precautions  UE Weight Bearing Status: Weight Bearing as Tolerated (per DPM)  Medical Precautions: Fall precautions, Seizure precautions  Precautions Comment: Per EMR: High fall risk         Objective     Pain:  Pain Assessment  Pain Assessment: 0-10  Pain Score: 0 - No pain    Cognition:  Cognition  Overall Cognitive Status: Within Functional Limits  Processing Speed: Delayed    General Assessments:      Activity Tolerance  Endurance: Decreased tolerance for upright activites                 Dynamic Sitting Balance  Dynamic Sitting-Comments: Fair+ static and dynamic sitting balance  Dynamic Standing Balance  Dynamic Standing-Comments: Poor static standing balance    Functional Assessments:     Bed Mobility  Bed Mobility: Yes  Bed Mobility 1  Bed Mobility 1: Supine to sitting  Level of Assistance 1: Minimum assistance  Bed Mobility Comments 1: A to maneuver LEs over EOB and to elevate trunk from  bed  Bed Mobility 2  Bed Mobility  2: Sitting to supine  Level of Assistance 2: Moderate assistance  Bed Mobility Comments 2: A to lift LEs onto bed  Transfers  Transfer: Yes  Transfer 1  Technique 1: Sit to stand  Transfer Device 1:  (FWW)  Transfer Level of Assistance 1: Minimum assistance, +2  Trials/Comments 1: A for lifting, transferring weight over feet, steadying. VC's for hand placement  Transfers 2  Technique 2: Stand to sit  Transfer Device 2:  (FWW)  Transfer Level of Assistance 2: Minimum assistance  Trials/Comments 2: A to control descent. VC's for hand placement.  Ambulation/Gait Training  Ambulation/Gait Training Performed: No (Pt. was unable to take any steps)  Stairs  Stairs: No       Extremity/Trunk Assessments:        RLE   RLE :  (AROM WFL, strength 4-/5)  LLE   LLE :  (AROM WFL, strength 4-/5)    Outcome Measures:     [facility] Basic Mobility  Turning from your back to your side while in a flat bed without using bedrails: A little  Moving from lying on your back to sitting on the side of a flat bed without using bedrails: A little  Moving to and from bed to chair (including a wheelchair): Total  Standing up from a chair using your arms (e.g. wheelchair or bedside chair): A lot  To walk in hospital room: Total  Climbing 3-5 steps with railing: Total  Basic Mobility - Total Score: 11                                        Goals:  Encounter Problems       Encounter Problems (Active)       PT Problem       Pt. will transfer supine/sit with CGA (Progressing)       Start:  06/13/24    Expected End:  06/27/24            Pt. will transfer sit/stand with FWW with CGA (Progressing)       Start:  06/13/24    Expected End:  06/27/24            Pt. will complete stand pivot transfers with FWW with MIN A x 1 (Not Progressing)       Start:  06/13/24    Expected End:  06/27/24            Pt.will ambulate 40' with FWW with MIN A x 1 (Not Progressing)       Start:  06/13/24    Expected End:  06/27/24            Pt.  will perform 2 x 15 B LE AROM exercises  (Not Progressing)       Start:  06/13/24    Expected End:  06/27/24                   Education Documentation  Mobility Training, taught by Russell Hyde, PT at 6/13/2024  2:10 PM.  Learner: Patient  Readiness: Acceptance  Method: Explanation  Response: Verbalizes Understanding, Needs Reinforcement  Comment: Role of PT, transfers, safety, PT POC    \

## 2024-06-14 LAB
ALBUMIN SERPL BCP-MCNC: 3 G/DL (ref 3.4–5)
ALP SERPL-CCNC: 173 U/L (ref 33–136)
ALT SERPL W P-5'-P-CCNC: 16 U/L (ref 7–45)
ANION GAP SERPL CALC-SCNC: 9 MMOL/L (ref 10–20)
AST SERPL W P-5'-P-CCNC: 18 U/L (ref 9–39)
ATRIAL RATE: 61 BPM
BILIRUB SERPL-MCNC: 0.4 MG/DL (ref 0–1.2)
BUN SERPL-MCNC: 39 MG/DL (ref 6–23)
CALCIUM SERPL-MCNC: 8.8 MG/DL (ref 8.6–10.3)
CHLORIDE SERPL-SCNC: 110 MMOL/L (ref 98–107)
CO2 SERPL-SCNC: 30 MMOL/L (ref 21–32)
CREAT SERPL-MCNC: 1.49 MG/DL (ref 0.5–1.05)
EGFRCR SERPLBLD CKD-EPI 2021: 40 ML/MIN/1.73M*2
ERYTHROCYTE [DISTWIDTH] IN BLOOD BY AUTOMATED COUNT: 19.9 % (ref 11.5–14.5)
GLUCOSE BLD MANUAL STRIP-MCNC: 107 MG/DL (ref 74–99)
GLUCOSE BLD MANUAL STRIP-MCNC: 117 MG/DL (ref 74–99)
GLUCOSE BLD MANUAL STRIP-MCNC: 122 MG/DL (ref 74–99)
GLUCOSE BLD MANUAL STRIP-MCNC: 210 MG/DL (ref 74–99)
GLUCOSE BLD MANUAL STRIP-MCNC: 68 MG/DL (ref 74–99)
GLUCOSE SERPL-MCNC: 66 MG/DL (ref 74–99)
HCT VFR BLD AUTO: 27 % (ref 36–46)
HGB BLD-MCNC: 8.2 G/DL (ref 12–16)
HOLD SPECIMEN: NORMAL
IRON SATN MFR SERPL: 71 % (ref 25–45)
IRON SERPL-MCNC: 157 UG/DL (ref 35–150)
MAGNESIUM SERPL-MCNC: 1.76 MG/DL (ref 1.6–2.4)
MCH RBC QN AUTO: 30.4 PG (ref 26–34)
MCHC RBC AUTO-ENTMCNC: 30.4 G/DL (ref 32–36)
MCV RBC AUTO: 100 FL (ref 80–100)
NRBC BLD-RTO: 0.8 /100 WBCS (ref 0–0)
P AXIS: 54 DEGREES
P OFFSET: 192 MS
P ONSET: 125 MS
PLATELET # BLD AUTO: 73 X10*3/UL (ref 150–450)
POTASSIUM SERPL-SCNC: 3.4 MMOL/L (ref 3.5–5.3)
PR INTERVAL: 180 MS
PROT SERPL-MCNC: 5.7 G/DL (ref 6.4–8.2)
Q ONSET: 215 MS
QRS COUNT: 10 BEATS
QRS DURATION: 110 MS
QT INTERVAL: 452 MS
QTC CALCULATION(BAZETT): 455 MS
QTC FREDERICIA: 454 MS
R AXIS: -5 DEGREES
RBC # BLD AUTO: 2.7 X10*6/UL (ref 4–5.2)
SODIUM SERPL-SCNC: 146 MMOL/L (ref 136–145)
T AXIS: -30 DEGREES
T OFFSET: 441 MS
TIBC SERPL-MCNC: 220 UG/DL (ref 240–445)
UIBC SERPL-MCNC: 63 UG/DL (ref 110–370)
VENTRICULAR RATE: 61 BPM
WBC # BLD AUTO: 3.6 X10*3/UL (ref 4.4–11.3)

## 2024-06-14 PROCEDURE — 99232 SBSQ HOSP IP/OBS MODERATE 35: CPT | Performed by: HOSPITALIST

## 2024-06-14 PROCEDURE — 82947 ASSAY GLUCOSE BLOOD QUANT: CPT | Mod: 91

## 2024-06-14 PROCEDURE — 99232 SBSQ HOSP IP/OBS MODERATE 35: CPT | Performed by: PSYCHIATRY & NEUROLOGY

## 2024-06-14 PROCEDURE — 1210000001 HC SEMI-PRIVATE ROOM DAILY

## 2024-06-14 PROCEDURE — 2500000004 HC RX 250 GENERAL PHARMACY W/ HCPCS (ALT 636 FOR OP/ED): Performed by: STUDENT IN AN ORGANIZED HEALTH CARE EDUCATION/TRAINING PROGRAM

## 2024-06-14 PROCEDURE — 2500000002 HC RX 250 W HCPCS SELF ADMINISTERED DRUGS (ALT 637 FOR MEDICARE OP, ALT 636 FOR OP/ED): Mod: MUE | Performed by: PSYCHIATRY & NEUROLOGY

## 2024-06-14 PROCEDURE — 2500000004 HC RX 250 GENERAL PHARMACY W/ HCPCS (ALT 636 FOR OP/ED): Performed by: HOSPITALIST

## 2024-06-14 PROCEDURE — 80053 COMPREHEN METABOLIC PANEL: CPT | Performed by: INTERNAL MEDICINE

## 2024-06-14 PROCEDURE — 83735 ASSAY OF MAGNESIUM: CPT | Performed by: HOSPITALIST

## 2024-06-14 PROCEDURE — 83540 ASSAY OF IRON: CPT | Performed by: HOSPITALIST

## 2024-06-14 PROCEDURE — 2500000001 HC RX 250 WO HCPCS SELF ADMINISTERED DRUGS (ALT 637 FOR MEDICARE OP): Performed by: STUDENT IN AN ORGANIZED HEALTH CARE EDUCATION/TRAINING PROGRAM

## 2024-06-14 PROCEDURE — 2500000004 HC RX 250 GENERAL PHARMACY W/ HCPCS (ALT 636 FOR OP/ED): Performed by: INTERNAL MEDICINE

## 2024-06-14 PROCEDURE — 97530 THERAPEUTIC ACTIVITIES: CPT | Mod: GP,CQ

## 2024-06-14 PROCEDURE — 36415 COLL VENOUS BLD VENIPUNCTURE: CPT | Performed by: HOSPITALIST

## 2024-06-14 PROCEDURE — 2500000002 HC RX 250 W HCPCS SELF ADMINISTERED DRUGS (ALT 637 FOR MEDICARE OP, ALT 636 FOR OP/ED): Performed by: INTERNAL MEDICINE

## 2024-06-14 PROCEDURE — 85027 COMPLETE CBC AUTOMATED: CPT | Performed by: HOSPITALIST

## 2024-06-14 RX ORDER — HYDROCORTISONE 5 MG/1
20 TABLET ORAL EVERY 12 HOURS SCHEDULED
Status: DISCONTINUED | OUTPATIENT
Start: 2024-06-14 | End: 2024-06-17 | Stop reason: HOSPADM

## 2024-06-14 RX ORDER — DEXTROSE MONOHYDRATE 50 MG/ML
75 INJECTION, SOLUTION INTRAVENOUS CONTINUOUS
Status: DISCONTINUED | OUTPATIENT
Start: 2024-06-14 | End: 2024-06-15

## 2024-06-14 RX ORDER — PALIPERIDONE 3 MG/1
6 TABLET, EXTENDED RELEASE ORAL DAILY
Status: DISCONTINUED | OUTPATIENT
Start: 2024-06-14 | End: 2024-06-17 | Stop reason: HOSPADM

## 2024-06-14 RX ORDER — POTASSIUM CHLORIDE 14.9 MG/ML
20 INJECTION INTRAVENOUS ONCE
Status: COMPLETED | OUTPATIENT
Start: 2024-06-14 | End: 2024-06-14

## 2024-06-14 ASSESSMENT — COGNITIVE AND FUNCTIONAL STATUS - GENERAL
STANDING UP FROM CHAIR USING ARMS: A LITTLE
DAILY ACTIVITIY SCORE: 14
MOVING FROM LYING ON BACK TO SITTING ON SIDE OF FLAT BED WITH BEDRAILS: A LITTLE
MOBILITY SCORE: 17
MOVING TO AND FROM BED TO CHAIR: A LITTLE
CLIMB 3 TO 5 STEPS WITH RAILING: A LOT
CLIMB 3 TO 5 STEPS WITH RAILING: A LOT
TURNING FROM BACK TO SIDE WHILE IN FLAT BAD: A LITTLE
WALKING IN HOSPITAL ROOM: A LITTLE
STANDING UP FROM CHAIR USING ARMS: A LITTLE
MOBILITY SCORE: 16
HELP NEEDED FOR BATHING: A LOT
DRESSING REGULAR LOWER BODY CLOTHING: A LOT
TURNING FROM BACK TO SIDE WHILE IN FLAT BAD: A LOT
DRESSING REGULAR UPPER BODY CLOTHING: A LOT
TOILETING: A LOT
PERSONAL GROOMING: A LOT
MOVING FROM LYING ON BACK TO SITTING ON SIDE OF FLAT BED WITH BEDRAILS: A LITTLE
WALKING IN HOSPITAL ROOM: A LITTLE
MOVING TO AND FROM BED TO CHAIR: A LITTLE

## 2024-06-14 ASSESSMENT — PAIN - FUNCTIONAL ASSESSMENT
PAIN_FUNCTIONAL_ASSESSMENT: 0-10

## 2024-06-14 ASSESSMENT — PAIN SCALES - GENERAL
PAINLEVEL_OUTOF10: 0 - NO PAIN

## 2024-06-14 NOTE — CARE PLAN
The patient's goals for the shift include no pain    The clinical goals for the shift include pt will have improved mentation for this shift        Problem: Nutrition  Goal: Less than 5 days NPO/clear liquids  Outcome: Progressing  Goal: Oral intake greater than 50%  Outcome: Progressing  Goal: Oral intake greater 75%  Outcome: Progressing  Goal: Consume prescribed supplement  Outcome: Progressing  Goal: Adequate PO fluid intake  Outcome: Progressing  Goal: Nutrition support goals are met within 48 hrs  Outcome: Progressing  Goal: Nutrition support is meeting 75% of nutrient needs  Outcome: Progressing  Goal: Tube feed tolerance  Outcome: Progressing  Goal: BG  mg/dL  Outcome: Progressing  Goal: Lab values WNL  Outcome: Progressing  Goal: Electrolytes WNL  Outcome: Progressing  Goal: Promote healing  Outcome: Progressing  Goal: Maintain stable weight  Outcome: Progressing  Goal: Reduce weight from edema/fluid  Outcome: Progressing  Goal: Gradual weight gain  Outcome: Progressing  Goal: Improve ostomy output  Outcome: Progressing     Problem: Discharge Planning  Goal: Discharge to home or other facility with appropriate resources  Outcome: Progressing     Problem: Chronic Conditions and Co-morbidities  Goal: Patient's chronic conditions and co-morbidity symptoms are monitored and maintained or improved  Outcome: Progressing     Problem: Pain  Goal: Turns in bed with improved pain control throughout the shift  Outcome: Progressing  Goal: Walks with improved pain control throughout the shift  Outcome: Progressing  Goal: Performs ADL's with improved pain control throughout shift  Outcome: Progressing  Goal: Participates in PT with improved pain control throughout the shift  Outcome: Progressing  Goal: Free from opioid side effects throughout the shift  Outcome: Progressing  Goal: Free from acute confusion related to pain meds throughout the shift  Outcome: Progressing     Problem: Psychosocial Needs  Goal:  Demonstrates ability to cope with hospitalization/illness  Outcome: Progressing  Goal: Collaborate with me, my family, and caregiver to identify my specific goals  Outcome: Progressing     Problem: Discharge Barriers  Goal: My discharge needs are met  Outcome: Progressing     Problem: Skin  Goal: Decreased wound size/increased tissue granulation at next dressing change  Outcome: Progressing  Flowsheets (Taken 6/13/2024 0021 by Penny Rea, RN)  Decreased wound size/increased tissue granulation at next dressing change: Promote sleep for wound healing  Goal: Participates in plan/prevention/treatment measures  Outcome: Progressing  Flowsheets (Taken 6/13/2024 1423 by Ashley Hodge, AMINAH)  Participates in plan/prevention/treatment measures: Elevate heels  Goal: Promote/optimize nutrition  Outcome: Progressing  Flowsheets (Taken 6/12/2024 1325 by Marla Mcintosh RN)  Promote/optimize nutrition:   Assist with feeding   Discuss with provider if NPO > 2 days   Offer water/supplements/favorite foods   Consume > 50% meals/supplements   Monitor/record intake including meals   Reassess MST if dietician not consulted  Goal: Promote skin healing  Outcome: Progressing  Flowsheets (Taken 6/13/2024 0021 by Penny Rea, RN)  Promote skin healing:   Turn/reposition every 2 hours/use positioning/transfer devices   Protective dressings over bony prominences     Problem: Diabetes  Goal: Decrease in ketones present in urine by end of shift  Outcome: Progressing  Goal: Maintain electrolyte levels within acceptable range throughout shift  Outcome: Progressing  Goal: Maintain glucose levels >70mg/dl to <250mg/dl throughout shift  Outcome: Progressing  Goal: No changes in neurological exam by end of shift  Outcome: Progressing  Goal: Learn about and adhere to nutrition recommendations by end of shift  Outcome: Progressing  Goal: Vital signs within normal range for age by end of shift  Outcome: Progressing  Goal: Increase self care and/or family  involovement by end of shift  Outcome: Progressing  Goal: Receive DSME education by end of shift  Outcome: Progressing     Problem: Fall/Injury  Goal: Not fall by end of shift  Outcome: Progressing  Goal: Be free from injury by end of the shift  Outcome: Progressing  Goal: Verbalize understanding of personal risk factors for fall in the hospital  Outcome: Progressing  Goal: Verbalize understanding of risk factor reduction measures to prevent injury from fall in the home  Outcome: Progressing  Goal: Use assistive devices by end of the shift  Outcome: Progressing  Goal: Pace activities to prevent fatigue by end of the shift  Outcome: Progressing

## 2024-06-14 NOTE — CARE PLAN
No further needs from neurology; okay for transfer or discharge as per primary team. Please contact if condition changes for re-eval.

## 2024-06-14 NOTE — PROGRESS NOTES
Physical Therapy    Physical Therapy Treatment    Patient Name: Bing Holliday  MRN: [id number]  Today's Date: 6/14/2024  Time Calculation  Start Time: 0852  Stop Time: 0916  Time Calculation (min): 24 min       Assessment/Plan   PT Assessment  PT Assessment Results: Decreased strength, Decreased endurance, Impaired balance, Decreased mobility, Impaired judgement, Decreased safety awareness  Rehab Prognosis: Good  Treatment Tolerance: Patient tolerated treatment well, Patient limited by fatigue  Medical Staff Made Aware: Yes  Strengths: Housing layout  Barriers to Participation: Comorbidities  End of Session Communication: Bedside nurse, PCT/NA/LILIANA  Assessment Comment: Patient continues to require (A) for safety with transfers/amb. Patient will benefit from additional PT to address deficits and improve mobility.  End of Session Patient Position: Bed, 4 rail up (Seizure precautions(pads on all 4 rails); Call light, phone, and tray table within reach.)  PT Plan  Inpatient/Swing Bed or Outpatient: Inpatient  Treatment/Interventions: Bed mobility, Transfer training, Gait training, Balance training, Strengthening, Endurance training, Therapeutic exercise  PT Plan: Ongoing PT  PT Frequency: 3 times per week  PT Discharge Recommendations: Moderate intensity level of continued care    PT Recommended Transfer Status: Assist x1-2, Assistive device    General Visit Information:   PT  Visit  PT Received On: 06/14/24  General  Family/Caregiver Present: No  Prior to Session Communication: Bedside nurse, PCT/ANDREW/LILIANA  Patient Position Received: Bed, 3 rail up, Alarm on  General Comment: Pleasant and cooperative.    General Observations:   General Observation: (A) to don shoes prior to amb. Purewick removed for mobility(nursing replacing at end of session).    Subjective     Precautions:  Precautions  UE Weight Bearing Status: Weight Bearing as Tolerated (per DPM)  Medical Precautions: Fall precautions  Precautions Comment: Per EMR:  High fall risk    Vital Signs:  Vital Signs  Heart Rate:  (60-71bpm during session.)  SpO2:  (On RA. SPO2 94-97% during session.)    Objective     Pain:  Pain Assessment  Pain Assessment: 0-10  Pain Score: 0 - No pain    Cognition:  Cognition  Orientation Level:  (Intermittent confusion; said she was going to walk to her brother in the hallway(no one in the [place]). Nursing aware.)  Processing Speed: Delayed      Balance:   Static Sitting Balance  Static Sitting-Comment/Number of Minutes: F+  Dynamic Sitting Balance  Dynamic Sitting-Comments: F+  Static Standing Balance  Static Standing-Comment/Number of Minutes: F- with ww  Dynamic Standing Balance  Dynamic Standing-Comments: F- with ww    Treatments:           Bed Mobility  Bed Mobility: Yes  Bed Mobility 1  Bed Mobility 1: Supine to sitting, Sitting to supine  Level of Assistance 1: Minimum assistance  Bed Mobility Comments 1: HOB ~40° supine to sit and HOB flat sit to supine. Hand over hand (A) to reach across body to use the bed rail for support. (A) to lift UB from HOB while moving LEs over the EOB. (A) to support UB while lifting LEs onto the bed.  Ambulation/Gait Training  Ambulation/Gait Training Performed: Yes  Ambulation/Gait Training 1  Surface 1: Level tile  Device 1: Rolling walker  Gait Support Devices: Gait belt  Assistance 1: Minimum assistance  Comments/Distance (ft) 1: ~25ft x2. NBOS. Slow aaron. Forward flexed posture. Step through gait pattern. Decreased step height/length B. v/c and (A) for safer maneuvering of ww with 90/180° turns. Increased shakiness at the end of amb, but no buckling of knees.  Transfers  Transfer: Yes  Transfer 1  Technique 1: Sit to stand, Stand to sit  Transfer Device 1: Gait belt (ww)  Transfer Level of Assistance 1: Minimum assistance  Trials/Comments 1: (2x). v/c for safe hand placement and technique. Slow transition of hands to/from ww. Benefits from elevated surfaces.          Outcome Measures:  [facility] Basic  Mobility  Turning from your back to your side while in a flat bed without using bedrails: A little  Moving from lying on your back to sitting on the side of a flat bed without using bedrails: A lot  Moving to and from bed to chair (including a wheelchair): A little  Standing up from a chair using your arms (e.g. wheelchair or bedside chair): A little  To walk in hospital room: A little  Climbing 3-5 steps with railing: A lot  Basic Mobility - Total Score: 16    Education Documentation  Mobility Training, taught by Maria C Moreno PTA at 6/14/2024  1:50 PM.  Learner: Patient  Readiness: Acceptance  Method: Explanation, Demonstration  Response: Verbalizes Understanding, Needs Reinforcement  Comment: See therapy note.    EDUCATION:  Individual(s) Educated: Patient  Education Provided: Body Mechanics, Fall Risk, POC, Posture (Balance; Safety with bed mobility/transfers/amb.)  Patient Response to Education: Patient/Caregiver Verbalized Understanding of Information, Patient/Caregiver Performed Return Demonstration of Exercises/Activities    Encounter Problems       Encounter Problems (Active)       PT Problem       Pt. will transfer supine/sit with CGA (Progressing)       Start:  06/13/24    Expected End:  06/27/24            Pt. will transfer sit/stand with FWW with CGA (Progressing)       Start:  06/13/24    Expected End:  06/27/24            Pt. will complete stand pivot transfers with FWW with MIN A x 1 (Progressing)       Start:  06/13/24    Expected End:  06/27/24            Pt.will ambulate 40' with FWW with MIN A x 1 (Progressing)       Start:  06/13/24    Expected End:  06/27/24            Pt. will perform 2 x 15 B LE AROM exercises  (Not Progressing)       Start:  06/13/24    Expected End:  06/27/24

## 2024-06-14 NOTE — PROGRESS NOTES
Endocrinology Progress Note  Patient: Bing ZARAGOZA [facility] [place]  Unit/Bed: 1105/1105-A  YOB: 1960  MRN: [id number]  Acct: [id number]   Admitting Diagnosis: Dehydration [E86.0]  Hypomagnesemia [E83.42]  Cellulitis of leg without foot [L03.119]  Acute kidney injury (CMS-HCC) [N17.9]  Open wound of lower extremity, unspecified laterality, initial encounter [S86.234M]  Anemia, unspecified type [D64.9]  Date:  6/4/2024  Hospital Day: 10  Attending: Kip Felipe MD       Complaint:  Chief Complaint   Patient presents with    Labs Only     Pt came by quad from [place] for abnormal labs (Na 152). CKD st 4.          Assessment     Patient Active Problem List   Diagnosis    COVID-19    Lupus (Multi)    Ambulatory dysfunction    Myelodysplastic syndrome, unspecified (Multi)    Lupus vasculitis (Multi)    Hyperlipidemia    Pneumonia of both lower lobes due to infectious organism    Hallucinations    Leg swelling    Hyperglycemia    JED (acute kidney injury) (CMS-[facility])    Hypernatremia    Proliferative diabetic retinopathy of right eye without macular edema determined by examination associated with type 2 diabetes mellitus (Multi)    Urinary incontinence    Paraparesis (Multi)    Abdominal cramping    Abnormal echocardiogram    Abnormal gait    Abnormal thyroid blood test    Advanced COPD (Multi)    Afferent pupillary defect of right eye    Anemia    Pancytopenia (Multi)    Altitudinal scotoma of right eye    Arcuate scotoma of left eye    Arthropathy    Asymptomatic PVCs    PAF (paroxysmal atrial fibrillation) (Multi)    Balance problem    Bilateral high frequency sensorineural hearing loss    Bradycardia    Branch retinal artery occlusion    Cardiomyopathy (Multi)    Chronic diarrhea    Chronic fatigue    Chronic kidney disease (CKD), stage III (moderate) (Multi)    CKD stage G3a/A2, GFR 45-59 and albumin creatinine ratio  mg/g (Multi)    Combined forms of age-related cataract of left eye    Combined forms of  age-related cataract of right eye    Contracture of hand    Snoring    CVA (cerebral vascular accident) (Multi)    Daytime somnolence    Degeneration of lumbar/lumbosacral disc without myelopathy    Depression, major    Dizziness    Dupuytren's contracture    Eczema    Elevated alkaline phosphatase level    Encephalopathy acute    Facial twitching    Fibromyalgia    Fungal nail infection    GERD (gastroesophageal reflux disease)    Gouty arthropathy    H/O iritis    H/O orthostatic hypotension    Hereditary elliptocytosis (CMS-HCC)    High level of cardiac marker    Hip hematoma, right    Hypothermia    Insomnia    Leg edema, left    Loss of consciousness (Multi)    Low blood sugar reading    Lung nodule, multiple    Lupus nephritis (Multi)    Metabolic encephalopathy    Muscle cramps    Nodule of skin of left lower leg    Obstructive lung disease (Multi)    Osteoarthritis of left hand    Osteoarthritis of right hand    Osteopenia    Osteoporosis    Palpitations    Peripheral visual field defect    Persistent proteinuria    Positive colorectal cancer screening using Cologuard test    Benign essential HTN    Psychosis (Multi)    Pulmonary hypertension (Multi)    Refractive error    Hypertensive retinopathy    Retinal neovascularization    Secondary pulmonary arterial hypertension (Multi)    Shortness of breath on exertion    Spinal stenosis of lumbar region with neurogenic claudication    Subjective tinnitus of both ears    Swelling of right foot    Syncope and collapse    Thrombophlebitis of superficial veins of left lower extremity    Tinnitus of left ear    Vitamin D deficiency    DM type 2 with diabetic peripheral neuropathy (Multi)    Systemic lupus erythematosus (Multi)    Diabetic nephropathy (Multi)    Functional diarrhea    UTI (urinary tract infection)    Moderate protein-calorie malnutrition (Multi)    Shock, unspecified (Multi)    Hyperkalemia    Adrenal insufficiency (Multi)    Seizure-like activity  (Multi)    Meningitis ([facility]-H[facility])    Encephalopathy    Seizure (Multi)    Uncontrolled blood glucose    Cervical spondylosis with myelopathy    COPD (chronic obstructive pulmonary disease) (Multi)    Encounter for diabetes education    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)    Chronic kidney disease, stage 4 (severe) (Multi)    Ulcerative (chronic) pancolitis with rectal bleeding (Multi)    Anxiety    Cortical cataract    Hyperparathyroidism due to renal insufficiency (Multi)    Iron deficiency anemia due to chronic blood loss    Left ventricular hypertrophy    Nephrosclerosis    Obesity    Ocular migraine    Persistent cough    Proliferative diabetic retinopathy (Multi)    Raynaud's disease    Disorientation    Bradycardia, unspecified    Cellulitis of leg without foot          Plan:  Adrenal insufficiency based on cosyntropin stimulation test and previous history of long-term steroid use for her lupus/arthritis  Patient is on Florinef to continue  Patient was on hydrocortisone will be started back on 20 mg twice daily now        SUBJECTIVE    Patient feeling tired feeling not well states she was hallucinating overnight with bad dreams  Seen by psych  Cosyntropin stimulation done yesterday results are back        VITALS      Vitals:    06/12/24 1958 06/13/24 0456 06/13/24 1925 06/14/24 0434   BP: 121/70 128/69 123/78 136/74   BP Location: Right arm Right arm Right arm Right arm   Patient Position: Lying Lying Lying Lying   Pulse: 60 60 60 60   Resp: 18 16 16 15   Temp: 35 °C (95 °F) 35.2 °C (95.4 °F) 35.1 °C (95.2 °F) 34.8 °C (94.6 °F)   TempSrc: Temporal Temporal Temporal Temporal   SpO2: 93% 100% 96% 98%   Weight:       Height:           Intake/Output Summary (Last 24 hours) at 6/14/2024 0909  Last data filed at 6/14/2024 0434  Gross per 24 hour   Intake --   Output 400 ml   Net -400 ml      Wt Readings from Last 4 Encounters:   06/04/24 95.3 kg (210 lb)   05/13/24 102 kg  "(224 lb 13.9 oz)   04/24/24 90.9 kg (200 lb 6.4 oz)   03/19/24 96.5 kg (212 lb 11.9 oz)        Allergies:  Allergies   Allergen Reactions    Ace Inhibitors Swelling, Angioedema, Shortness of breath and Other     'FACIAL TWISTING\" \"LOOKS LIKE I HAD A STROKE IN MY SLEEP\"    Hydroxychloroquine Other, Nausea And Vomiting, Nausea/vomiting and Unknown     RETINAL BLEEDING    RETINAL BLEEDING      RETINAL BLEEDING, Eye problems    Lisinopril Swelling    Penicillins Unknown     tolerates cephalosporins-unknown childhood allergy    Sulfa (Sulfonamide Antibiotics) Hives        PHYSICAL EXAM   Physical Exam      LABS   Magnesium:  Results from last 7 days   Lab Units 06/14/24  0626 06/12/24  0548 06/11/24  0632   MAGNESIUM mg/dL 1.76 1.90 1.97     Lipid Panel:       Lab Review  Lab Results   Component Value Date    BILITOT 0.4 06/14/2024    CALCIUM 8.8 06/14/2024    CO2 30 06/14/2024     (H) 06/14/2024    CREATININE 1.49 (H) 06/14/2024    GLUCOSE 66 (L) 06/14/2024    ALKPHOS 173 (H) 06/14/2024    K 3.4 (L) 06/14/2024    PROT 5.7 (L) 06/14/2024     (H) 06/14/2024    AST 18 06/14/2024    ALT 16 06/14/2024    BUN 39 (H) 06/14/2024    ANIONGAP 9 (L) 06/14/2024    MG 1.76 06/14/2024    PHOS 4.7 06/07/2024    GGT 63 (H) 01/07/2021     04/25/2024    ALBUMIN 3.0 (L) 06/14/2024    LIPASE 30 01/24/2023    GFRF 30 (A) 09/01/2023    GFRMALE CANCELED 04/05/2023     Lab Results   Component Value Date    TRIG 79 03/24/2023    CHOL 160 03/24/2023    HDL 70.9 03/24/2023     Lab Results   Component Value Date    HGBA1C 6.8 (H) 06/04/2024    HGBA1C 7.0 (H) 05/31/2024    HGBA1C 8.4 (H) 04/24/2024     The ASCVD Risk score (Stone ANDRADE, et al., 2019) failed to calculate for the following reasons:    The patient has a prior MI or stroke diagnosis   Lab Results   Component Value Date    HGBA1C 6.8 (H) 06/04/2024    HGBA1C 7.0 (H) 05/31/2024    HGBA1C 8.4 (H) 04/24/2024     (H) 06/14/2024    K 3.4 (L) 06/14/2024     (H) " 06/14/2024    CO2 30 06/14/2024    BUN 39 (H) 06/14/2024    CREATININE 1.49 (H) 06/14/2024    CALCIUM 8.8 06/14/2024    ALBUMIN 3.0 (L) 06/14/2024    PROT 5.7 (L) 06/14/2024    BILITOT 0.4 06/14/2024    ALKPHOS 173 (H) 06/14/2024    ALT 16 06/14/2024    AST 18 06/14/2024    GLUCOSE 66 (L) 06/14/2024    CHOL 160 03/24/2023    TRIG 79 03/24/2023    HDL 70.9 03/24/2023    BHYDRXBUT 0.07 10/23/2023    CPEPTIDE 2.9 04/25/2024    INSAB <0.4 11/21/2023        apixaban, 2.5 mg, oral, BID  atorvastatin, 40 mg, oral, Nightly  fludrocortisone, 0.1 mg, oral, Every other day  tiotropium, 2 puff, inhalation, Daily   And  fluticasone furoate-vilanteroL, 1 puff, inhalation, Daily  folic acid, 1 mg, oral, Daily  hydrocortisone, 20 mg, oral, q12h GERALD  insulin glargine, 10 Units, subcutaneous, q AM  insulin lispro, 0-10 Units, subcutaneous, TID  levETIRAcetam, 500 mg, intravenous, q12h  melatonin, 5 mg, oral, Nightly  metoprolol tartrate, 25 mg, oral, BID  mycophenolate, 1,000 mg, oral, BID  paliperidone, 6 mg, oral, Daily  pantoprazole, 40 mg, oral, Daily  thiamine, 100 mg, oral, Daily  [Held by provider] torsemide, 40 mg, oral, Daily             Electronically signed by Mela Banuelos MD on 6/14/2024 at 9:09 AM

## 2024-06-14 NOTE — CARE PLAN
Problem: Fall/Injury  Goal: Not fall by end of shift  Outcome: Progressing  Goal: Be free from injury by end of the shift  Outcome: Progressing  Goal: Verbalize understanding of personal risk factors for fall in the hospital  Outcome: Progressing  Goal: Verbalize understanding of risk factor reduction measures to prevent injury from fall in the home  Outcome: Progressing  Goal: Use assistive devices by end of the shift  Outcome: Progressing  Goal: Pace activities to prevent fatigue by end of the shift  Outcome: Progressing   The patient's goals for the shift include no pain    The clinical goals for the shift include pt will have improved mentation for this shift

## 2024-06-14 NOTE — CARE PLAN
Problem: Fall/Injury  Goal: Not fall by end of shift  6/14/2024 1251 by Radha Yeager RN  Outcome: Progressing  6/14/2024 1244 by Radha Yeager RN  Outcome: Progressing  Goal: Be free from injury by end of the shift  6/14/2024 1251 by Radha Yeager RN  Outcome: Progressing  6/14/2024 1244 by Radha Yeager RN  Outcome: Progressing  Goal: Verbalize understanding of personal risk factors for fall in the hospital  6/14/2024 1251 by Radha Yeager RN  Outcome: Progressing  6/14/2024 1244 by Radha Yeager RN  Outcome: Progressing  Goal: Verbalize understanding of risk factor reduction measures to prevent injury from fall in the home  6/14/2024 1251 by Radha Yeager RN  Outcome: Progressing  6/14/2024 1244 by Radha Yeager RN  Outcome: Progressing  Goal: Use assistive devices by end of the shift  6/14/2024 1251 by Radha Yeager RN  Outcome: Progressing  6/14/2024 1244 by Radha Yeager RN  Outcome: Progressing  Goal: Pace activities to prevent fatigue by end of the shift  6/14/2024 1251 by Radha Yeager RN  Outcome: Progressing  6/14/2024 1244 by Radha Yeager RN  Outcome: Progressing     Problem: Skin  Goal: Decreased wound size/increased tissue granulation at next dressing change  6/14/2024 1251 by Radha Yeager RN  Outcome: Progressing  Flowsheets (Taken 6/14/2024 1251)  Decreased wound size/increased tissue granulation at next dressing change:   Promote sleep for wound healing   Protective dressings over bony prominences  6/14/2024 1250 by Radha Yeager RN  Flowsheets (Taken 6/13/2024 0021 by Penny Rea RN)  Decreased wound size/increased tissue granulation at next dressing change: Promote sleep for wound healing  6/14/2024 1244 by Radha Yeager RN  Outcome: Progressing  Flowsheets (Taken 6/13/2024 0021 by Penny Rea RN)  Decreased wound size/increased tissue granulation at next dressing change: Promote sleep for wound healing  Goal: Participates in plan/prevention/treatment measures  6/14/2024  1251 by Radha Yeager RN  Outcome: Progressing  Flowsheets (Taken 6/14/2024 1251)  Participates in plan/prevention/treatment measures:   Discuss with provider PT/OT consult   Elevate heels   Increase activity/out of bed for meals  6/14/2024 1250 by Radha Yeager RN  Flowsheets (Taken 6/13/2024 1423 by Ashley Hodge RN)  Participates in plan/prevention/treatment measures: Elevate heels  6/14/2024 1244 by Radha Yeager RN  Outcome: Progressing  Flowsheets (Taken 6/13/2024 1423 by Ashley Hodge, AMINAH)  Participates in plan/prevention/treatment measures: Elevate heels  Goal: Promote/optimize nutrition  6/14/2024 1251 by Radha Yeager RN  Outcome: Progressing  Flowsheets (Taken 6/14/2024 1251)  Promote/optimize nutrition:   Monitor/record intake including meals   Consume > 50% meals/supplements  6/14/2024 1250 by Radha Yeager RN  Flowsheets (Taken 6/12/2024 1325 by Marla Mcintosh RN)  Promote/optimize nutrition:   Assist with feeding   Discuss with provider if NPO > 2 days   Offer water/supplements/favorite foods   Consume > 50% meals/supplements   Monitor/record intake including meals   Reassess MST if dietician not consulted  6/14/2024 1244 by Radha Yeager RN  Outcome: Progressing  Flowsheets (Taken 6/12/2024 1325 by Marla Mcintosh RN)  Promote/optimize nutrition:   Assist with feeding   Discuss with provider if NPO > 2 days   Offer water/supplements/favorite foods   Consume > 50% meals/supplements   Monitor/record intake including meals   Reassess MST if dietician not consulted  Goal: Promote skin healing  6/14/2024 1251 by Radha Yeager RN  Outcome: Progressing  Flowsheets (Taken 6/14/2024 1251)  Promote skin healing:   Assess skin/pad under line(s)/device(s)   Turn/reposition every 2 hours/use positioning/transfer devices  6/14/2024 1250 by Radha Yeager RN  Flowsheets (Taken 6/13/2024 0021 by Penny Rea RN)  Promote skin healing:   Turn/reposition every 2 hours/use positioning/transfer devices   Protective  dressings over bony prominences  6/14/2024 1244 by Radha Yeager RN  Outcome: Progressing  Flowsheets (Taken 6/13/2024 0021 by Penny Rea RN)  Promote skin healing:   Turn/reposition every 2 hours/use positioning/transfer devices   Protective dressings over bony prominences   The patient's goals for the shift include no pain    The clinical goals for the shift include pt will have improved mentation for this shift

## 2024-06-14 NOTE — PROGRESS NOTES
Please do not charge patient twice the second charge code entered in error today while I was doing an addendum    Bing Holliday is a 63 y.o. female on day 10 of admission has continued to improve with her mental state.  She denied any hallucinations this morning although had experienced hallucinations yesterday evening and last night.  She was also bothered by the itching that led to some bleeding which upset her.  Invega would be raised to a dose of 6 mg from 4.5.  We monitor for EPS and no EPS evident right now.  She got up physical therapy yesterday and was encouraged to give it her best so that she can start to be more ambulatory.  Patient is tolerating medications well.   Labs Reviewed.  Vitals Reviewed.   Nursing Notes Reviewed.   No EPS, TD, vitals stable.      MSE: Patient was alert, but was not oriented to time, place, person and situation.  She was provided orientation.  Patient appears well groomed and clad in climate appropriate clothes. Patient is cooperative on approach.   Recent memory poor and remote memory good. Memory registration and recall fair. Attention and concentration restricted. Speech low in volume and slow in rate.  Fair eye contact. Thought process poverty of thoughts. Impaired associations. Limited fund of knowledge. Mood ok affect flat. Patient did not endorse any delusions. Patient has been experiencing both auditory visual hallucinations intermittently. Patient has denied any active suicidal  ideations and is future oriented.  Patient has improving yet limited insight, limited judgment and fair impulse control.      Musculoskeletal: Patient bedbound and gait could not be assessed, no Parkinsonism, no Dystonia, no Akathisia, no TD. Psychomotor activity within normal limits.     Diagnosis: Psychosis     Assessment and Plan:      Increase Invega 6 mg once a day  Consider giving long-acting injection Invega Sustenna corresponding to the oral dose either 117 mg or 156 mg depending on  "how she tolerates the 6 mg.  Plan to offer long-acting injection depending on her response to Invega and also monitoring for side effects.     Patient is not cleared from psychiatry standpoint  Psychiatry will follow while patient is in the hospital  Continue with current treatment and medication adjustments. Patient is agreeable with continued medication management and adjustments discussed      Last Recorded Vitals  /74 (BP Location: Right arm, Patient Position: Lying)   Pulse 60   Temp 34.8 °C (94.6 °F) (Temporal)   Resp 15   Ht 1.778 m (5' 10\")   Wt 95.3 kg (210 lb)   LMP  (LMP Unknown)   SpO2 98%   BMI 30.13 kg/m²      Recent Results (from the past 24 hour(s))   Cortisol Baseline    Collection Time: 06/13/24  9:10 AM   Result Value Ref Range    Baseline Cortisol 7.3 ug/dL   Cortisol 30 minute    Collection Time: 06/13/24  9:45 AM   Result Value Ref Range    30 minute Cortisol  12.2 ug/dL   Cortisol 60 minute    Collection Time: 06/13/24 10:15 AM   Result Value Ref Range    60 minute Cortisol  13.6 ug/dL   POCT GLUCOSE    Collection Time: 06/13/24 11:13 AM   Result Value Ref Range    POCT Glucose 110 (H) 74 - 99 mg/dL   POCT GLUCOSE    Collection Time: 06/13/24  4:04 PM   Result Value Ref Range    POCT Glucose 126 (H) 74 - 99 mg/dL   POCT GLUCOSE    Collection Time: 06/13/24  7:54 PM   Result Value Ref Range    POCT Glucose 160 (H) 74 - 99 mg/dL   CBC    Collection Time: 06/14/24  6:26 AM   Result Value Ref Range    WBC 3.6 (L) 4.4 - 11.3 x10*3/uL    nRBC 0.8 (H) 0.0 - 0.0 /100 WBCs    RBC 2.70 (L) 4.00 - 5.20 x10*6/uL    Hemoglobin 8.2 (L) 12.0 - 16.0 g/dL    Hematocrit 27.0 (L) 36.0 - 46.0 %     80 - 100 fL    MCH 30.4 26.0 - 34.0 pg    MCHC 30.4 (L) 32.0 - 36.0 g/dL    RDW 19.9 (H) 11.5 - 14.5 %    Platelets 73 (L) 150 - 450 x10*3/uL   Magnesium    Collection Time: 06/14/24  6:26 AM   Result Value Ref Range    Magnesium 1.76 1.60 - 2.40 mg/dL   Comprehensive Metabolic Panel    Collection " Time: 06/14/24  6:26 AM   Result Value Ref Range    Glucose 66 (L) 74 - 99 mg/dL    Sodium 146 (H) 136 - 145 mmol/L    Potassium 3.4 (L) 3.5 - 5.3 mmol/L    Chloride 110 (H) 98 - 107 mmol/L    Bicarbonate 30 21 - 32 mmol/L    Anion Gap 9 (L) 10 - 20 mmol/L    Urea Nitrogen 39 (H) 6 - 23 mg/dL    Creatinine 1.49 (H) 0.50 - 1.05 mg/dL    eGFR 40 (L) >60 mL/min/1.73m*2    Calcium 8.8 8.6 - 10.3 mg/dL    Albumin 3.0 (L) 3.4 - 5.0 g/dL    Alkaline Phosphatase 173 (H) 33 - 136 U/L    Total Protein 5.7 (L) 6.4 - 8.2 g/dL    AST 18 9 - 39 U/L    Bilirubin, Total 0.4 0.0 - 1.2 mg/dL    ALT 16 7 - 45 U/L   POCT GLUCOSE    Collection Time: 06/14/24  7:27 AM   Result Value Ref Range    POCT Glucose 68 (L) 74 - 99 mg/dL          Current Facility-Administered Medications:     acetaminophen (Tylenol) oral liquid 650 mg, 650 mg, oral, q4h PRN **OR** acetaminophen (Tylenol) tablet 650 mg, 650 mg, oral, q4h PRN, Vivek Orozco MD, 650 mg at 06/06/24 2059    apixaban (Eliquis) tablet 2.5 mg, 2.5 mg, oral, BID, Vivek Orozco MD, 2.5 mg at 06/13/24 2104    atorvastatin (Lipitor) tablet 40 mg, 40 mg, oral, Nightly, Vivek Orozco MD, 40 mg at 06/13/24 2104    dextrose 50 % injection 12.5 g, 12.5 g, intravenous, q15 min PRN, Vivek Orozco MD, 12.5 g at 06/08/24 0913    dextrose 50 % injection 25 g, 25 g, intravenous, q15 min PRN, Vivek Orozco MD    fludrocortisone (Florinef) tablet 0.1 mg, 0.1 mg, oral, Every other day, Vivek Orozco MD, 0.1 mg at 06/13/24 0909    tiotropium (Spiriva Respimat) 2.5 mcg/actuation inhaler 2 puff, 2 puff, inhalation, Daily, 2 puff at 06/13/24 0604 **AND** fluticasone furoate-vilanteroL (Breo Ellipta) 200-25 mcg/dose inhaler 1 puff, 1 puff, inhalation, Daily, Vivek Orozco MD, 1 puff at 06/13/24 0604    folic acid (Folvite) tablet 1 mg, 1 mg, oral, Daily, Vivek Orozco MD, 1 mg at 06/13/24 0909    glucagon (Glucagen) injection 1 mg, 1 mg,  intramuscular, q15 min PRN, Vivek Orozco MD    glucagon (Glucagen) injection 1 mg, 1 mg, intramuscular, q15 min PRN, Vivek Orozco MD    HYDROcodone-acetaminophen (Norco) 5-325 mg per tablet 1 tablet, 1 tablet, oral, q6h PRN, Cheko Bonilla MD, 1 tablet at 06/08/24 0535    insulin glargine (Lantus) injection 10 Units, 10 Units, subcutaneous, q AM, Kip Felipe MD, 10 Units at 06/13/24 0922    insulin lispro (HumaLOG) injection 0-10 Units, 0-10 Units, subcutaneous, TID, Vivek Orozco MD, 4 Units at 06/12/24 0812    levETIRAcetam (Keppra)  mg, 500 mg, intravenous, q12h, Neno Rodarte MD, Stopped at 06/13/24 2119    meclizine (Antivert) tablet 25 mg, 25 mg, oral, q12h PRN, Vivek Orozco MD    melatonin tablet 5 mg, 5 mg, oral, Nightly, Vivek Orozco MD, 5 mg at 06/13/24 2104    metoprolol tartrate (Lopressor) tablet 25 mg, 25 mg, oral, BID, Vivek Orozco MD, 25 mg at 06/13/24 2104    morphine injection 1 mg, 1 mg, intravenous, q4h PRN, Neno Rodarte MD, 1 mg at 06/13/24 0536    mycophenolate (Cellcept) capsule 1,000 mg, 1,000 mg, oral, BID, Vivek Orozco MD, 1,000 mg at 06/13/24 2104    OLANZapine (ZyPREXA) injection 5 mg, 5 mg, intramuscular, q6h PRN, JIM Garcia-CNP    paliperidone (Invega) 24 hr tablet 6 mg, 6 mg, oral, Daily, Jaiden Cabral MD    pantoprazole (ProtoNix) EC tablet 40 mg, 40 mg, oral, Daily, Vivek Orozco MD, 40 mg at 06/14/24 0548    thiamine (Vitamin B-1) tablet 100 mg, 100 mg, oral, Daily, Vivek Orozco MD, 100 mg at 06/13/24 0909    [Held by provider] torsemide (Demadex) tablet 40 mg, 40 mg, oral, Daily, Sully Gutiérrez MD, 40 mg at 06/08/24 0928       Jaiden Cabral MD

## 2024-06-14 NOTE — PROGRESS NOTES
"Bing Holliday is a 63 y.o. female on day 10 of admission presenting with cellulitis        Subjective  feeling tired but feels much better today.    Objective     Last Recorded Vitals  /74 (BP Location: Right arm, Patient Position: Lying)   Pulse 60   Temp 34.8 °C (94.6 °F) (Temporal)   Resp 15   Ht 1.778 m (5' 10\")   Wt 95.3 kg (210 lb)   LMP  (LMP Unknown)   SpO2 98%   BMI 30.13 kg/m²      Intake/Output last 3 Shifts:    Intake/Output Summary (Last 24 hours) at 6/14/2024 1150  Last data filed at 6/14/2024 0434  Gross per 24 hour   Intake --   Output 400 ml   Net -400 ml       Physical Exam   Gen: Aox2  HEENT: EOM, MMM  CV: RRR, no murmurs rubs or gallops  Resp: coarse rhonchi   Abdomen: soft, NT,+BS  LE: No edema      Relevant Results  Lab Results   Component Value Date    WBC 3.6 (L) 06/14/2024    HGB 8.2 (L) 06/14/2024    HCT 27.0 (L) 06/14/2024     06/14/2024    PLT 73 (L) 06/14/2024     Lab Results   Component Value Date    GLUCOSE 66 (L) 06/14/2024    CALCIUM 8.8 06/14/2024     (H) 06/14/2024    K 3.4 (L) 06/14/2024    CO2 30 06/14/2024     (H) 06/14/2024    BUN 39 (H) 06/14/2024    CREATININE 1.49 (H) 06/14/2024     Lab Results   Component Value Date    HGBA1C 6.8 (H) 06/04/2024         Assessment/Plan  63 year old female with history of Lupus, adrenal insuffiencey, lupus,  presented from living facility with cellulitis and worsening confusion     Worsening confusion and pyschosis: neuro and psych consulted  -likely psychosis, psych recommending IP therapy, started on invega   -seen by PT/OT     Adrenal insufficiency: continue florinef, stim test done, will add PO hydrocortisone BID,     JED on CKD stage 3 with hypernatremia: nephrology following,  -fluids adjusted per nephrology recs, improving     Cellulitis: resolved, stop IV Abx, ID signed off     Hx of PAF: on eliquis    DMII: resumed insulin therapy as PO intake improved     DVT ppx: kehinde Felipe MD   "

## 2024-06-14 NOTE — PROGRESS NOTES
"Renal Progress Note  Assessment:  63 y.o. female with history s/f T2DM, SLE, COPD, adrenal insufficiency, Raynauds, RA, CKD stage III, depression, pHTN, pA.fib, gout, fibromyalgia, HTN and HLD who presented for abnormal labs.     JED on CKD stage III: ? If in setting of CRS as function improving w/ diuresis, CKD risk factors 2/2 T2DM, HTN, unlikely in setting of SLE, baseline Scr ~1.3-1.4 w/ eGFR low/mid 40s  Fluid overload: improved   Hypomagnesemia   Hypernatremia: Again with calculated water deficit 2 L  Hypokalemia: corrected      Plan:  -Resume D5 75 cc/h until reassess clinical and laboratory tomorrow  We will start Procrit 8000 weekly months ago ferritin was above 1000      Subjective:   Admit Date: 6/4/2024    Interval History: Seen and examined uneventful night continue to improve      Medications:   Scheduled Meds:apixaban, 2.5 mg, oral, BID  atorvastatin, 40 mg, oral, Nightly  fludrocortisone, 0.1 mg, oral, Every other day  tiotropium, 2 puff, inhalation, Daily   And  fluticasone furoate-vilanteroL, 1 puff, inhalation, Daily  folic acid, 1 mg, oral, Daily  hydrocortisone, 20 mg, oral, q12h GERALD  insulin glargine, 10 Units, subcutaneous, q AM  insulin lispro, 0-10 Units, subcutaneous, TID  levETIRAcetam, 500 mg, intravenous, q12h  melatonin, 5 mg, oral, Nightly  metoprolol tartrate, 25 mg, oral, BID  mycophenolate, 1,000 mg, oral, BID  paliperidone, 6 mg, oral, Daily  pantoprazole, 40 mg, oral, Daily  potassium chloride, 20 mEq, intravenous, Once  thiamine, 100 mg, oral, Daily  [Held by provider] torsemide, 40 mg, oral, Daily      Continuous Infusions:     CBC:   Lab Results   Component Value Date    HGB 8.2 (L) 06/14/2024    HGB 8.5 (L) 06/12/2024    WBC 3.6 (L) 06/14/2024    WBC 5.8 06/12/2024    PLT 73 (L) 06/14/2024    PLT 94 (L) 06/12/2024      Anemia:  No results found for: \"FERRITIN\", \"IRON\", \"TIBC\"   BMP:    Lab Results   Component Value Date     (H) 06/14/2024     (H) 06/13/2024    K " "3.4 (L) 06/14/2024    K 3.5 06/13/2024     (H) 06/14/2024     (H) 06/13/2024    CO2 30 06/14/2024    CO2 30 06/13/2024    BUN 39 (H) 06/14/2024    BUN 48 (H) 06/13/2024    CREATININE 1.49 (H) 06/14/2024    CREATININE 1.63 (H) 06/13/2024      Bone disease: No results found for: \"PHOS\", \"PTH\", \"VITD25\"   Urinalysis:  No results found for: \"KRYSTAL\", \"PROTUR\", \"GLUCOSEU\", \"BLOODU\", \"KETONESU\", \"BILIRUBINU\", \"NITRITEU\", \"LEUKOCYTESU\", \"UTPCR\"     Objective:   Vitals: /74 (BP Location: Right arm, Patient Position: Lying)   Pulse 60   Temp 34.8 °C (94.6 °F) (Temporal)   Resp 15   Ht 1.778 m (5' 10\")   Wt 95.3 kg (210 lb)   LMP  (LMP Unknown)   SpO2 98%   BMI 30.13 kg/m²    Wt Readings from Last 3 Encounters:   06/04/24 95.3 kg (210 lb)   05/13/24 102 kg (224 lb 13.9 oz)   04/24/24 90.9 kg (200 lb 6.4 oz)      24HR INTAKE/OUTPUT:    Intake/Output Summary (Last 24 hours) at 6/14/2024 1241  Last data filed at 6/14/2024 0434  Gross per 24 hour   Intake --   Output 400 ml   Net -400 ml     Admission weight:  Weight: 95.3 kg (210 lb)      Constitutional:  Alert, awake, no apparent distress   Skin:normal, no rash  HEENT:sclera anicteric.  Head atraumatic normocephalic  Neck:supple with no thyromegally  Cardiovascular:  S1, S2 without m/r/g   Respiratory:  CTA B without w/r/r   Abdomen: +bs, soft, nt  Ext: no LE edema  Musculoskeletal:Intact  Neuro:Alert and oriented with no deficit      Electronically signed by Sully Gutiérrez MD on 6/14/2024 at 12:41 PM            "

## 2024-06-14 NOTE — PROGRESS NOTES
06/14/24 1618   Current Planned Discharge Disposition   Current Planned Discharge Disposition SNF  ([facility])     Facility has submitted to insurance for precert. Updated clinical information sent to the facility.

## 2024-06-15 LAB
ALBUMIN SERPL BCP-MCNC: 2.9 G/DL (ref 3.4–5)
ALP SERPL-CCNC: 159 U/L (ref 33–136)
ALT SERPL W P-5'-P-CCNC: 11 U/L (ref 7–45)
ANION GAP SERPL CALC-SCNC: 11 MMOL/L (ref 10–20)
AST SERPL W P-5'-P-CCNC: 20 U/L (ref 9–39)
BILIRUB SERPL-MCNC: 0.3 MG/DL (ref 0–1.2)
BUN SERPL-MCNC: 39 MG/DL (ref 6–23)
CALCIUM SERPL-MCNC: 8.3 MG/DL (ref 8.6–10.3)
CHLORIDE SERPL-SCNC: 111 MMOL/L (ref 98–107)
CO2 SERPL-SCNC: 25 MMOL/L (ref 21–32)
CREAT SERPL-MCNC: 1.65 MG/DL (ref 0.5–1.05)
EGFRCR SERPLBLD CKD-EPI 2021: 35 ML/MIN/1.73M*2
ERYTHROCYTE [DISTWIDTH] IN BLOOD BY AUTOMATED COUNT: 19.5 % (ref 11.5–14.5)
GLUCOSE BLD MANUAL STRIP-MCNC: 135 MG/DL (ref 74–99)
GLUCOSE BLD MANUAL STRIP-MCNC: 144 MG/DL (ref 74–99)
GLUCOSE BLD MANUAL STRIP-MCNC: 164 MG/DL (ref 74–99)
GLUCOSE BLD MANUAL STRIP-MCNC: 98 MG/DL (ref 74–99)
GLUCOSE SERPL-MCNC: 90 MG/DL (ref 74–99)
HCT VFR BLD AUTO: 24.1 % (ref 36–46)
HCT VFR BLD AUTO: 26.2 % (ref 36–46)
HGB BLD-MCNC: 7.2 G/DL (ref 12–16)
HGB BLD-MCNC: 7.8 G/DL (ref 12–16)
MAGNESIUM SERPL-MCNC: 1.62 MG/DL (ref 1.6–2.4)
MCH RBC QN AUTO: 30.1 PG (ref 26–34)
MCHC RBC AUTO-ENTMCNC: 29.9 G/DL (ref 32–36)
MCV RBC AUTO: 101 FL (ref 80–100)
NRBC BLD-RTO: 1 /100 WBCS (ref 0–0)
PLATELET # BLD AUTO: 75 X10*3/UL (ref 150–450)
POTASSIUM SERPL-SCNC: 4.3 MMOL/L (ref 3.5–5.3)
PROT SERPL-MCNC: 5.3 G/DL (ref 6.4–8.2)
RBC # BLD AUTO: 2.39 X10*6/UL (ref 4–5.2)
SODIUM SERPL-SCNC: 143 MMOL/L (ref 136–145)
WBC # BLD AUTO: 5.9 X10*3/UL (ref 4.4–11.3)

## 2024-06-15 PROCEDURE — 2500000002 HC RX 250 W HCPCS SELF ADMINISTERED DRUGS (ALT 637 FOR MEDICARE OP, ALT 636 FOR OP/ED): Performed by: INTERNAL MEDICINE

## 2024-06-15 PROCEDURE — 2500000002 HC RX 250 W HCPCS SELF ADMINISTERED DRUGS (ALT 637 FOR MEDICARE OP, ALT 636 FOR OP/ED): Mod: MUE | Performed by: PSYCHIATRY & NEUROLOGY

## 2024-06-15 PROCEDURE — 1210000001 HC SEMI-PRIVATE ROOM DAILY

## 2024-06-15 PROCEDURE — 99231 SBSQ HOSP IP/OBS SF/LOW 25: CPT | Performed by: PSYCHIATRY & NEUROLOGY

## 2024-06-15 PROCEDURE — 99232 SBSQ HOSP IP/OBS MODERATE 35: CPT | Performed by: HOSPITALIST

## 2024-06-15 PROCEDURE — 2500000001 HC RX 250 WO HCPCS SELF ADMINISTERED DRUGS (ALT 637 FOR MEDICARE OP): Performed by: HOSPITALIST

## 2024-06-15 PROCEDURE — 36415 COLL VENOUS BLD VENIPUNCTURE: CPT | Performed by: HOSPITALIST

## 2024-06-15 PROCEDURE — 2500000004 HC RX 250 GENERAL PHARMACY W/ HCPCS (ALT 636 FOR OP/ED): Performed by: INTERNAL MEDICINE

## 2024-06-15 PROCEDURE — 85027 COMPLETE CBC AUTOMATED: CPT | Performed by: HOSPITALIST

## 2024-06-15 PROCEDURE — 80053 COMPREHEN METABOLIC PANEL: CPT | Performed by: INTERNAL MEDICINE

## 2024-06-15 PROCEDURE — 85014 HEMATOCRIT: CPT | Performed by: HOSPITALIST

## 2024-06-15 PROCEDURE — 82947 ASSAY GLUCOSE BLOOD QUANT: CPT | Mod: 91

## 2024-06-15 PROCEDURE — 2500000001 HC RX 250 WO HCPCS SELF ADMINISTERED DRUGS (ALT 637 FOR MEDICARE OP): Performed by: STUDENT IN AN ORGANIZED HEALTH CARE EDUCATION/TRAINING PROGRAM

## 2024-06-15 PROCEDURE — 6350000001 HC RX 635 EPOETIN >10,000 UNITS: Performed by: INTERNAL MEDICINE

## 2024-06-15 PROCEDURE — 83735 ASSAY OF MAGNESIUM: CPT | Performed by: HOSPITALIST

## 2024-06-15 PROCEDURE — 2500000002 HC RX 250 W HCPCS SELF ADMINISTERED DRUGS (ALT 637 FOR MEDICARE OP, ALT 636 FOR OP/ED): Performed by: HOSPITALIST

## 2024-06-15 PROCEDURE — 2500000004 HC RX 250 GENERAL PHARMACY W/ HCPCS (ALT 636 FOR OP/ED): Performed by: STUDENT IN AN ORGANIZED HEALTH CARE EDUCATION/TRAINING PROGRAM

## 2024-06-15 RX ORDER — LEVETIRACETAM 500 MG/1
500 TABLET ORAL 2 TIMES DAILY
Status: DISCONTINUED | OUTPATIENT
Start: 2024-06-15 | End: 2024-06-17 | Stop reason: HOSPADM

## 2024-06-15 ASSESSMENT — COGNITIVE AND FUNCTIONAL STATUS - GENERAL
MOBILITY SCORE: 17
DRESSING REGULAR LOWER BODY CLOTHING: A LOT
MOVING TO AND FROM BED TO CHAIR: A LITTLE
WALKING IN HOSPITAL ROOM: A LITTLE
DRESSING REGULAR UPPER BODY CLOTHING: A LOT
DAILY ACTIVITIY SCORE: 13
DAILY ACTIVITIY SCORE: 14
MOVING FROM LYING ON BACK TO SITTING ON SIDE OF FLAT BED WITH BEDRAILS: A LITTLE
PERSONAL GROOMING: A LOT
MOBILITY SCORE: 14
TOILETING: A LOT
MOVING FROM LYING ON BACK TO SITTING ON SIDE OF FLAT BED WITH BEDRAILS: A LITTLE
CLIMB 3 TO 5 STEPS WITH RAILING: A LOT
WALKING IN HOSPITAL ROOM: TOTAL
DRESSING REGULAR LOWER BODY CLOTHING: A LOT
TURNING FROM BACK TO SIDE WHILE IN FLAT BAD: A LITTLE
DRESSING REGULAR UPPER BODY CLOTHING: A LOT
CLIMB 3 TO 5 STEPS WITH RAILING: TOTAL
MOVING TO AND FROM BED TO CHAIR: A LITTLE
STANDING UP FROM CHAIR USING ARMS: A LITTLE
STANDING UP FROM CHAIR USING ARMS: A LITTLE
TURNING FROM BACK TO SIDE WHILE IN FLAT BAD: A LITTLE
HELP NEEDED FOR BATHING: A LOT
PERSONAL GROOMING: A LOT
TOILETING: A LOT
TOILETING: A LOT
MOVING TO AND FROM BED TO CHAIR: A LITTLE
TURNING FROM BACK TO SIDE WHILE IN FLAT BAD: A LITTLE
DRESSING REGULAR LOWER BODY CLOTHING: A LOT
MOVING FROM LYING ON BACK TO SITTING ON SIDE OF FLAT BED WITH BEDRAILS: A LITTLE
MOBILITY SCORE: 17
HELP NEEDED FOR BATHING: A LOT
PERSONAL GROOMING: TOTAL
DAILY ACTIVITIY SCORE: 14
WALKING IN HOSPITAL ROOM: A LITTLE
DRESSING REGULAR UPPER BODY CLOTHING: A LOT
STANDING UP FROM CHAIR USING ARMS: A LITTLE
CLIMB 3 TO 5 STEPS WITH RAILING: A LOT
HELP NEEDED FOR BATHING: A LOT

## 2024-06-15 ASSESSMENT — PAIN SCALES - GENERAL: PAINLEVEL_OUTOF10: 0 - NO PAIN

## 2024-06-15 NOTE — PROGRESS NOTES
Renal Progress Note  Assessment:  63 y.o. female with history s/f T2DM, SLE with hx of lupus nephritis class V in 2011, COPD, adrenal insufficiency, Raynauds, RA, CKD stage 3b, depression, pHTN, pA.fib, gout, fibromyalgia, HTN and HLD who presented for abnormal labs.     JED on CKD stage III: ? If in setting of CRS as function improving w/ diuresis, CKD risk factors 2/2 T2DM, HTN, unlikely in setting of SLE, baseline Scr ~1.3-1.4 w/ eGFR low/mid 40s  Fluid overload: improved   Hypomagnesemia   Hypernatremia:   Hypokalemia: corrected      Plan:  -complicated case in terms of chronic management  - her regular rheumatologist is Dr. Castellanos but I don't think she has seen her in a while  - maintained on mycophenolate for lupus and steroids for lupus and adrenal insufficiency  - has chronic pancytopenia  - started on retacrit here  - can stop the d5w and encourage free water intake  - re quanitfy proteinuria.  May be candidate for lupkynis or benlysta but may need repeat renal biopsy.  Pt has had persistent proteinuria.        Subjective:   Admit Date: 6/4/2024    Interval History: drinking about 1.5 pitchers of fluid / day.  On d5w for the high na.  Labs a little better.  No new complaints.       Medications:   Scheduled Meds:apixaban, 2.5 mg, oral, BID  atorvastatin, 40 mg, oral, Nightly  epoetin laura or biosimilar, 8,000 Units, subcutaneous, Weekly  fludrocortisone, 0.1 mg, oral, Every other day  tiotropium, 2 puff, inhalation, Daily   And  fluticasone furoate-vilanteroL, 1 puff, inhalation, Daily  folic acid, 1 mg, oral, Daily  hydrocortisone, 20 mg, oral, q12h GERALD  insulin glargine, 10 Units, subcutaneous, q AM  insulin lispro, 0-10 Units, subcutaneous, TID  levETIRAcetam, 500 mg, oral, BID  melatonin, 5 mg, oral, Nightly  metoprolol tartrate, 25 mg, oral, BID  mycophenolate, 1,000 mg, oral, BID  paliperidone, 6 mg, oral, Daily  pantoprazole, 40 mg, oral, Daily  thiamine, 100 mg, oral, Daily  [Held by provider]  "torsemide, 40 mg, oral, Daily      Continuous Infusions:dextrose 5%, 75 mL/hr, Last Rate: 75 mL/hr (06/15/24 0610)        CBC:   Lab Results   Component Value Date    HGB 7.8 (L) 06/15/2024    HGB 7.2 (L) 06/15/2024    WBC 5.9 06/15/2024    WBC 3.6 (L) 06/14/2024    PLT 75 (L) 06/15/2024    PLT 73 (L) 06/14/2024      Anemia:    Lab Results   Component Value Date    IRON 157 (H) 06/14/2024    TIBC 220 (L) 06/14/2024      BMP:    Lab Results   Component Value Date     06/15/2024     (H) 06/14/2024    K 4.3 06/15/2024    K 3.4 (L) 06/14/2024     (H) 06/15/2024     (H) 06/14/2024    CO2 25 06/15/2024    CO2 30 06/14/2024    BUN 39 (H) 06/15/2024    BUN 39 (H) 06/14/2024    CREATININE 1.65 (H) 06/15/2024    CREATININE 1.49 (H) 06/14/2024      Bone disease: No results found for: \"PHOS\", \"PTH\", \"VITD25\"   Urinalysis:  No results found for: \"KRYSTAL\", \"PROTUR\", \"GLUCOSEU\", \"BLOODU\", \"KETONESU\", \"BILIRUBINU\", \"NITRITEU\", \"LEUKOCYTESU\", \"UTPCR\"     Objective:   Vitals: /58   Pulse 65   Temp 35.5 °C (95.9 °F)   Resp 18   Ht 1.778 m (5' 10\")   Wt 95.3 kg (210 lb)   LMP  (LMP Unknown)   SpO2 94%   BMI 30.13 kg/m²    Wt Readings from Last 3 Encounters:   06/04/24 95.3 kg (210 lb)   05/13/24 102 kg (224 lb 13.9 oz)   04/24/24 90.9 kg (200 lb 6.4 oz)      24HR INTAKE/OUTPUT:    Intake/Output Summary (Last 24 hours) at 6/15/2024 1322  Last data filed at 6/14/2024 1700  Gross per 24 hour   Intake 300 ml   Output --   Net 300 ml     Admission weight:  Weight: 95.3 kg (210 lb)      Constitutional:  Alert, awake, no apparent distress   Skin:normal, no rash  HEENT:sclera anicteric.  Head atraumatic normocephalic  Neck:supple with no thyromegally  Cardiovascular:  S1, S2 without m/r/g   Respiratory:  CTA B without w/r/r   Abdomen: +bs, soft, nt  Ext: no LE edema  Musculoskeletal:Intact  Neuro:Alert and oriented with no deficit      Electronically signed by Victor Hugo Evans MD on 6/15/2024 at 1:22 " PM

## 2024-06-15 NOTE — PROGRESS NOTES
"Bing Holliday is a 63 y.o. female on day 11 of admission presenting with cellulitis        Subjective  feeling tired but feels much better today.    Objective     Last Recorded Vitals  /58   Pulse 65   Temp 35.5 °C (95.9 °F)   Resp 18   Ht 1.778 m (5' 10\")   Wt 95.3 kg (210 lb)   LMP  (LMP Unknown)   SpO2 94%   BMI 30.13 kg/m²      Intake/Output last 3 Shifts:    Intake/Output Summary (Last 24 hours) at 6/15/2024 1102  Last data filed at 6/14/2024 1700  Gross per 24 hour   Intake 406.25 ml   Output --   Net 406.25 ml       Physical Exam   Gen: Aox2  HEENT: EOM, MMM  CV: RRR, no murmurs rubs or gallops  Resp: coarse rhonchi   Abdomen: soft, NT,+BS  LE: No edema      Relevant Results  Lab Results   Component Value Date    WBC 5.9 06/15/2024    HGB 7.8 (L) 06/15/2024    HCT 26.2 (L) 06/15/2024     (H) 06/15/2024    PLT 75 (L) 06/15/2024     Lab Results   Component Value Date    GLUCOSE 90 06/15/2024    CALCIUM 8.3 (L) 06/15/2024     06/15/2024    K 4.3 06/15/2024    CO2 25 06/15/2024     (H) 06/15/2024    BUN 39 (H) 06/15/2024    CREATININE 1.65 (H) 06/15/2024     Lab Results   Component Value Date    HGBA1C 6.8 (H) 06/04/2024         Assessment/Plan  63 year old female with history of Lupus, adrenal insuffiencey, lupus,  presented from living facility with cellulitis and worsening confusion     Worsening confusion and pyschosis: neuro and psych consulted  -likely psychosis, psych following started on invega and improved, does not need IP psych, needs outpatient follow up    -seen by PT/OT     Adrenal insufficiency: continue florinef, stim test done, will add PO hydrocortisone BID,     JED on CKD stage 3 with hypernatremia: nephrology following,  -fluids adjusted per nephrology recs, improving    Cellulitis: resolved, stop IV Abx, ID signed off     Hx of PAF: on eliquis    Chronic anemia: H/H stable, no signs of bleeding     DMII: resumed insulin therapy as PO intake improved     DVT " ppx: kehinde Felipe MD

## 2024-06-16 LAB
ALBUMIN SERPL BCP-MCNC: 2.8 G/DL (ref 3.4–5)
ALP SERPL-CCNC: 152 U/L (ref 33–136)
ALT SERPL W P-5'-P-CCNC: 11 U/L (ref 7–45)
ANION GAP SERPL CALC-SCNC: 11 MMOL/L (ref 10–20)
APPEARANCE UR: CLEAR
AST SERPL W P-5'-P-CCNC: 16 U/L (ref 9–39)
BACTERIA #/AREA URNS AUTO: ABNORMAL /HPF
BILIRUB SERPL-MCNC: 0.3 MG/DL (ref 0–1.2)
BILIRUB UR STRIP.AUTO-MCNC: NEGATIVE MG/DL
BUN SERPL-MCNC: 37 MG/DL (ref 6–23)
CALCIUM SERPL-MCNC: 8.3 MG/DL (ref 8.6–10.3)
CHLORIDE SERPL-SCNC: 115 MMOL/L (ref 98–107)
CO2 SERPL-SCNC: 25 MMOL/L (ref 21–32)
COLOR UR: ABNORMAL
CREAT SERPL-MCNC: 1.46 MG/DL (ref 0.5–1.05)
CREAT UR-MCNC: 79.1 MG/DL (ref 20–320)
EGFRCR SERPLBLD CKD-EPI 2021: 41 ML/MIN/1.73M*2
GLUCOSE BLD MANUAL STRIP-MCNC: 117 MG/DL (ref 74–99)
GLUCOSE BLD MANUAL STRIP-MCNC: 145 MG/DL (ref 74–99)
GLUCOSE BLD MANUAL STRIP-MCNC: 56 MG/DL (ref 74–99)
GLUCOSE BLD MANUAL STRIP-MCNC: 82 MG/DL (ref 74–99)
GLUCOSE BLD MANUAL STRIP-MCNC: 83 MG/DL (ref 74–99)
GLUCOSE BLD MANUAL STRIP-MCNC: 98 MG/DL (ref 74–99)
GLUCOSE SERPL-MCNC: 62 MG/DL (ref 74–99)
GLUCOSE UR STRIP.AUTO-MCNC: NORMAL MG/DL
KETONES UR STRIP.AUTO-MCNC: NEGATIVE MG/DL
LEUKOCYTE ESTERASE UR QL STRIP.AUTO: ABNORMAL
MUCOUS THREADS #/AREA URNS AUTO: ABNORMAL /LPF
NITRITE UR QL STRIP.AUTO: NEGATIVE
PH UR STRIP.AUTO: 5.5 [PH]
POTASSIUM SERPL-SCNC: 4 MMOL/L (ref 3.5–5.3)
PROT SERPL-MCNC: 5.2 G/DL (ref 6.4–8.2)
PROT UR STRIP.AUTO-MCNC: ABNORMAL MG/DL
PROT UR-ACNC: 147 MG/DL (ref 5–24)
PROT/CREAT UR: 1.86 MG/MG CREAT (ref 0–0.17)
RBC # UR STRIP.AUTO: ABNORMAL /UL
RBC #/AREA URNS AUTO: ABNORMAL /HPF
SODIUM SERPL-SCNC: 147 MMOL/L (ref 136–145)
SP GR UR STRIP.AUTO: 1.02
SQUAMOUS #/AREA URNS AUTO: ABNORMAL /HPF
URATE CRY #/AREA UR COMP ASSIST: ABNORMAL /HPF
UROBILINOGEN UR STRIP.AUTO-MCNC: NORMAL MG/DL
WBC #/AREA URNS AUTO: ABNORMAL /HPF

## 2024-06-16 PROCEDURE — 1210000001 HC SEMI-PRIVATE ROOM DAILY

## 2024-06-16 PROCEDURE — 82947 ASSAY GLUCOSE BLOOD QUANT: CPT | Mod: 91

## 2024-06-16 PROCEDURE — 2500000004 HC RX 250 GENERAL PHARMACY W/ HCPCS (ALT 636 FOR OP/ED): Performed by: INTERNAL MEDICINE

## 2024-06-16 PROCEDURE — 2500000001 HC RX 250 WO HCPCS SELF ADMINISTERED DRUGS (ALT 637 FOR MEDICARE OP): Performed by: STUDENT IN AN ORGANIZED HEALTH CARE EDUCATION/TRAINING PROGRAM

## 2024-06-16 PROCEDURE — 82570 ASSAY OF URINE CREATININE: CPT | Performed by: INTERNAL MEDICINE

## 2024-06-16 PROCEDURE — 2500000005 HC RX 250 GENERAL PHARMACY W/O HCPCS: Performed by: STUDENT IN AN ORGANIZED HEALTH CARE EDUCATION/TRAINING PROGRAM

## 2024-06-16 PROCEDURE — 80053 COMPREHEN METABOLIC PANEL: CPT | Performed by: INTERNAL MEDICINE

## 2024-06-16 PROCEDURE — 2500000001 HC RX 250 WO HCPCS SELF ADMINISTERED DRUGS (ALT 637 FOR MEDICARE OP): Performed by: HOSPITALIST

## 2024-06-16 PROCEDURE — 2500000002 HC RX 250 W HCPCS SELF ADMINISTERED DRUGS (ALT 637 FOR MEDICARE OP, ALT 636 FOR OP/ED): Performed by: INTERNAL MEDICINE

## 2024-06-16 PROCEDURE — 2500000002 HC RX 250 W HCPCS SELF ADMINISTERED DRUGS (ALT 637 FOR MEDICARE OP, ALT 636 FOR OP/ED): Performed by: HOSPITALIST

## 2024-06-16 PROCEDURE — 99232 SBSQ HOSP IP/OBS MODERATE 35: CPT | Performed by: HOSPITALIST

## 2024-06-16 PROCEDURE — 81001 URINALYSIS AUTO W/SCOPE: CPT | Performed by: INTERNAL MEDICINE

## 2024-06-16 PROCEDURE — 36415 COLL VENOUS BLD VENIPUNCTURE: CPT | Performed by: INTERNAL MEDICINE

## 2024-06-16 PROCEDURE — 2500000002 HC RX 250 W HCPCS SELF ADMINISTERED DRUGS (ALT 637 FOR MEDICARE OP, ALT 636 FOR OP/ED): Mod: MUE | Performed by: PSYCHIATRY & NEUROLOGY

## 2024-06-16 PROCEDURE — 2500000004 HC RX 250 GENERAL PHARMACY W/ HCPCS (ALT 636 FOR OP/ED): Performed by: STUDENT IN AN ORGANIZED HEALTH CARE EDUCATION/TRAINING PROGRAM

## 2024-06-16 RX ORDER — HYDROCORTISONE 20 MG/1
20 TABLET ORAL EVERY 12 HOURS SCHEDULED
Start: 2024-06-16

## 2024-06-16 RX ORDER — PALIPERIDONE 6 MG/1
6 TABLET, EXTENDED RELEASE ORAL DAILY
Start: 2024-06-17

## 2024-06-16 RX ORDER — DEXTROSE MONOHYDRATE 50 MG/ML
50 INJECTION, SOLUTION INTRAVENOUS CONTINUOUS
Status: DISCONTINUED | OUTPATIENT
Start: 2024-06-16 | End: 2024-06-17 | Stop reason: HOSPADM

## 2024-06-16 ASSESSMENT — COGNITIVE AND FUNCTIONAL STATUS - GENERAL
DRESSING REGULAR LOWER BODY CLOTHING: A LOT
CLIMB 3 TO 5 STEPS WITH RAILING: TOTAL
TURNING FROM BACK TO SIDE WHILE IN FLAT BAD: A LITTLE
DAILY ACTIVITIY SCORE: 12
MOBILITY SCORE: 8
DRESSING REGULAR LOWER BODY CLOTHING: A LOT
PERSONAL GROOMING: TOTAL
MOVING FROM LYING ON BACK TO SITTING ON SIDE OF FLAT BED WITH BEDRAILS: A LOT
HELP NEEDED FOR BATHING: A LOT
TOILETING: A LOT
MOVING TO AND FROM BED TO CHAIR: TOTAL
WALKING IN HOSPITAL ROOM: TOTAL
HELP NEEDED FOR BATHING: A LOT
TURNING FROM BACK TO SIDE WHILE IN FLAT BAD: A LOT
DRESSING REGULAR UPPER BODY CLOTHING: A LOT
MOBILITY SCORE: 14
CLIMB 3 TO 5 STEPS WITH RAILING: TOTAL
DRESSING REGULAR UPPER BODY CLOTHING: A LOT
STANDING UP FROM CHAIR USING ARMS: A LITTLE
PERSONAL GROOMING: TOTAL
MOVING FROM LYING ON BACK TO SITTING ON SIDE OF FLAT BED WITH BEDRAILS: A LITTLE
TOILETING: A LOT
EATING MEALS: A LITTLE
MOVING TO AND FROM BED TO CHAIR: A LITTLE
EATING MEALS: A LITTLE
DAILY ACTIVITIY SCORE: 12
WALKING IN HOSPITAL ROOM: TOTAL
STANDING UP FROM CHAIR USING ARMS: TOTAL

## 2024-06-16 ASSESSMENT — PAIN SCALES - GENERAL
PAINLEVEL_OUTOF10: 0 - NO PAIN
PAINLEVEL_OUTOF10: 0 - NO PAIN
PAINLEVEL_OUTOF10: 7
PAINLEVEL_OUTOF10: 8
PAINLEVEL_OUTOF10: 0 - NO PAIN

## 2024-06-16 ASSESSMENT — PAIN DESCRIPTION - LOCATION: LOCATION: BACK

## 2024-06-16 ASSESSMENT — PAIN SCALES - PAIN ASSESSMENT IN ADVANCED DEMENTIA (PAINAD): TOTALSCORE: MEDICATION (SEE MAR)

## 2024-06-16 ASSESSMENT — PAIN - FUNCTIONAL ASSESSMENT
PAIN_FUNCTIONAL_ASSESSMENT: 0-10
PAIN_FUNCTIONAL_ASSESSMENT: WONG-BAKER FACES

## 2024-06-16 ASSESSMENT — PAIN SCALES - WONG BAKER: WONGBAKER_NUMERICALRESPONSE: NO HURT

## 2024-06-16 NOTE — CARE PLAN
Problem: Nutrition  Goal: Less than 5 days NPO/clear liquids  Outcome: Progressing  Goal: Oral intake greater than 50%  Outcome: Progressing  Goal: Oral intake greater 75%  Outcome: Progressing  Goal: Consume prescribed supplement  Outcome: Progressing  Goal: Adequate PO fluid intake  Outcome: Progressing  Goal: Nutrition support goals are met within 48 hrs  Outcome: Progressing  Goal: Nutrition support is meeting 75% of nutrient needs  Outcome: Progressing  Goal: Tube feed tolerance  Outcome: Progressing  Goal: BG  mg/dL  Outcome: Progressing  Goal: Lab values WNL  Outcome: Progressing  Goal: Electrolytes WNL  Outcome: Progressing  Goal: Promote healing  Outcome: Progressing  Goal: Maintain stable weight  Outcome: Progressing  Goal: Reduce weight from edema/fluid  Outcome: Progressing  Goal: Gradual weight gain  Outcome: Progressing  Goal: Improve ostomy output  Outcome: Progressing     Problem: Discharge Planning  Goal: Discharge to home or other facility with appropriate resources  Outcome: Progressing  Flowsheets (Taken 6/12/2024 1325 by Marla Mcintosh, RN)  Discharge to home or other facility with appropriate resources:   Identify barriers to discharge with patient and caregiver   Arrange for needed discharge resources and transportation as appropriate   Identify discharge learning needs (meds, wound care, etc)   Arrange for interpreters to assist at discharge as needed   Refer to discharge planning if patient needs post-hospital services based on physician order or complex needs related to functional status, cognitive ability or social support system     Problem: Chronic Conditions and Co-morbidities  Goal: Patient's chronic conditions and co-morbidity symptoms are monitored and maintained or improved  Outcome: Progressing  Flowsheets (Taken 6/12/2024 1322 by Marla Mcintosh, RN)  Care Plan - Patient's Chronic Conditions and Co-Morbidity Symptoms are Monitored and Maintained or Improved:   Monitor and assess  patient's chronic conditions and comorbid symptoms for stability, deterioration, or improvement   Collaborate with multidisciplinary team to address chronic and comorbid conditions and prevent exacerbation or deterioration   Update acute care plan with appropriate goals if chronic or comorbid symptoms are exacerbated and prevent overall improvement and discharge     Problem: Pain  Goal: Turns in bed with improved pain control throughout the shift  Outcome: Progressing  Goal: Walks with improved pain control throughout the shift  Outcome: Progressing  Goal: Performs ADL's with improved pain control throughout shift  Outcome: Progressing  Goal: Participates in PT with improved pain control throughout the shift  Outcome: Progressing  Goal: Free from opioid side effects throughout the shift  Outcome: Progressing  Goal: Free from acute confusion related to pain meds throughout the shift  Outcome: Progressing     Problem: Psychosocial Needs  Goal: Demonstrates ability to cope with hospitalization/illness  Outcome: Progressing  Flowsheets (Taken 6/12/2024 1325 by Marla Mcintosh RN)  Demonstrates ability to cope with hospitalization/illness:   Encourage verbalization of feelings/concerns/expectations   Provide low-stimulation environment as needed   Assist resident to identify and practice own strengths and abilities   Encourage resident to set and complete small goals for self   Encourage participation in diversional activities   Reinforce positive adaptation of new coping behaviors   Include resident/family/caregiver in decisions related to psychosocial needs  Goal: Collaborate with me, my family, and caregiver to identify my specific goals  Outcome: Progressing  Flowsheets (Taken 6/4/2024 2059 by Stephanie Garica RN)  Cultural Requests During Hospitalization: no  Spiritual Requests During Hospitalization: no     Problem: Discharge Barriers  Goal: My discharge needs are met  Outcome: Progressing  Flowsheets (Taken  6/16/2024 1757)  Resident's discharge needs are met: Identify potential discharge barriers on admission and throughout stay     Problem: Skin  Goal: Decreased wound size/increased tissue granulation at next dressing change  Outcome: Progressing  Flowsheets (Taken 6/15/2024 1925)  Decreased wound size/increased tissue granulation at next dressing change:   Promote sleep for wound healing   Protective dressings over bony prominences  Goal: Participates in plan/prevention/treatment measures  Outcome: Progressing  Flowsheets (Taken 6/14/2024 1251 by Radha Yeager, RN)  Participates in plan/prevention/treatment measures:   Discuss with provider PT/OT consult   Elevate heels   Increase activity/out of bed for meals  Goal: Promote/optimize nutrition  Outcome: Progressing  Flowsheets (Taken 6/14/2024 1251 by Radha Yeager, RN)  Promote/optimize nutrition:   Monitor/record intake including meals   Consume > 50% meals/supplements  Goal: Promote skin healing  Outcome: Progressing  Flowsheets (Taken 6/14/2024 1251 by Radha Yeager, RN)  Promote skin healing:   Assess skin/pad under line(s)/device(s)   Turn/reposition every 2 hours/use positioning/transfer devices     Problem: Diabetes  Goal: Decrease in ketones present in urine by end of shift  Outcome: Progressing  Flowsheets (Taken 6/16/2024 1757)  Decrease in ketones present in urine by end of shift: Med administration/monitoring of effect  Goal: Maintain electrolyte levels within acceptable range throughout shift  Outcome: Progressing  Flowsheets (Taken 6/16/2024 1757)  Maintain electrolyte levels within acceptable range throughout shift: Med administration/monitoring of effect  Goal: Maintain glucose levels >70mg/dl to <250mg/dl throughout shift  Outcome: Progressing  Flowsheets (Taken 6/16/2024 1757)  Maintain glucose levels >70mg/dl to <250mg/dl throughout shift: Med administration/monitoring of effect  Goal: No changes in neurological exam by end of shift  Outcome:  Progressing  Goal: Learn about and adhere to nutrition recommendations by end of shift  Outcome: Progressing  Flowsheets (Taken 6/16/2024 1757)  Learn about and adhere to nutrition recommendations by end of shift: Ensure/encourage compliance with appropriate diet  Goal: Vital signs within normal range for age by end of shift  Outcome: Progressing  Flowsheets (Taken 6/16/2024 1757)  Vital signs within normal range for age by end of shift: Med administration/monitoring of effect  Goal: Increase self care and/or family involovement by end of shift  Outcome: Progressing  Goal: Receive DSME education by end of shift  Outcome: Progressing     Problem: Fall/Injury  Goal: Not fall by end of shift  Outcome: Progressing  Goal: Be free from injury by end of the shift  Outcome: Progressing  Goal: Verbalize understanding of personal risk factors for fall in the hospital  Outcome: Progressing  Goal: Verbalize understanding of risk factor reduction measures to prevent injury from fall in the home  Outcome: Progressing  Goal: Use assistive devices by end of the shift  Outcome: Progressing  Goal: Pace activities to prevent fatigue by end of the shift  Outcome: Progressing   The patient's goals for the shift include no pain    The clinical goals for the shift include Patient will be alert and oriented throughout shift.

## 2024-06-16 NOTE — CARE PLAN
The patient's goals for the shift include no pain    The clinical goals for the shift include Reorient with reality      Problem: Nutrition  Goal: Less than 5 days NPO/clear liquids  Outcome: Progressing  Goal: Oral intake greater than 50%  Outcome: Progressing  Goal: Oral intake greater 75%  Outcome: Progressing  Goal: Consume prescribed supplement  Outcome: Progressing  Goal: Adequate PO fluid intake  Outcome: Progressing  Goal: Nutrition support goals are met within 48 hrs  Outcome: Progressing  Goal: Nutrition support is meeting 75% of nutrient needs  Outcome: Progressing  Goal: Tube feed tolerance  Outcome: Progressing  Goal: BG  mg/dL  Outcome: Progressing  Goal: Lab values WNL  Outcome: Progressing  Goal: Electrolytes WNL  Outcome: Progressing  Goal: Promote healing  Outcome: Progressing  Goal: Maintain stable weight  Outcome: Progressing  Goal: Reduce weight from edema/fluid  Outcome: Progressing  Goal: Gradual weight gain  Outcome: Progressing  Goal: Improve ostomy output  Outcome: Progressing     Problem: Discharge Planning  Goal: Discharge to home or other facility with appropriate resources  Outcome: Progressing     Problem: Chronic Conditions and Co-morbidities  Goal: Patient's chronic conditions and co-morbidity symptoms are monitored and maintained or improved  Outcome: Progressing     Problem: Pain  Goal: Turns in bed with improved pain control throughout the shift  Outcome: Progressing  Goal: Walks with improved pain control throughout the shift  Outcome: Progressing  Goal: Performs ADL's with improved pain control throughout shift  Outcome: Progressing  Goal: Participates in PT with improved pain control throughout the shift  Outcome: Progressing  Goal: Free from opioid side effects throughout the shift  Outcome: Progressing  Goal: Free from acute confusion related to pain meds throughout the shift  Outcome: Progressing     Problem: Psychosocial Needs  Goal: Demonstrates ability to cope with  hospitalization/illness  Outcome: Progressing  Goal: Collaborate with me, my family, and caregiver to identify my specific goals  Outcome: Progressing     Problem: Discharge Barriers  Goal: My discharge needs are met  Outcome: Progressing     Problem: Skin  Goal: Decreased wound size/increased tissue granulation at next dressing change  Outcome: Progressing  Goal: Participates in plan/prevention/treatment measures  Outcome: Progressing  Goal: Promote/optimize nutrition  Outcome: Progressing  Goal: Promote skin healing  Outcome: Progressing     Problem: Diabetes  Goal: Decrease in ketones present in urine by end of shift  Outcome: Progressing  Goal: Maintain electrolyte levels within acceptable range throughout shift  Outcome: Progressing  Goal: Maintain glucose levels >70mg/dl to <250mg/dl throughout shift  Outcome: Progressing  Goal: No changes in neurological exam by end of shift  Outcome: Progressing  Goal: Learn about and adhere to nutrition recommendations by end of shift  Outcome: Progressing  Goal: Vital signs within normal range for age by end of shift  Outcome: Progressing  Goal: Increase self care and/or family involovement by end of shift  Outcome: Progressing  Goal: Receive DSME education by end of shift  Outcome: Progressing     Problem: Fall/Injury  Goal: Not fall by end of shift  Outcome: Progressing  Goal: Be free from injury by end of the shift  Outcome: Progressing  Goal: Verbalize understanding of personal risk factors for fall in the hospital  Outcome: Progressing  Goal: Verbalize understanding of risk factor reduction measures to prevent injury from fall in the home  Outcome: Progressing  Goal: Use assistive devices by end of the shift  Outcome: Progressing  Goal: Pace activities to prevent fatigue by end of the shift  Outcome: Progressing

## 2024-06-16 NOTE — PROGRESS NOTES
Renal Progress Note  Assessment:  63 y.o. female with history s/f T2DM, SLE with hx of lupus nephritis class V in 2011, COPD, adrenal insufficiency, Raynauds, RA, CKD stage 3b, depression, pHTN, pA.fib, gout, fibromyalgia, HTN and HLD who presented for abnormal labs.     JED on CKD stage III: ? If in setting of CRS as function improving w/ diuresis, CKD risk factors 2/2 T2DM, HTN, unlikely in setting of SLE, baseline Scr ~1.3-1.4 w/ eGFR low/mid 40s  Fluid overload: improved   Hypomagnesemia   Hypernatremia:   Hypokalemia: corrected      Plan:  -complicated case in terms of chronic management  - her regular rheumatologist is Dr. Castellanos but I don't think she has seen her in a while  - maintained on mycophenolate for lupus and steroids for lupus and adrenal insufficiency  - has chronic pancytopenia  - started on retacrit here  - encourage free water intake  - re quanitfy proteinuria - has over 1g.  May be candidate for lupkynis or benlysta but may need repeat renal biopsy.  Pt has had persistent proteinuria.  Would want her seen back by rheum before deciding.  If her lupus would warrant benlysta anyways, would consider holding off on biopsy  - check complement      Subjective:   Admit Date: 6/4/2024    Interval History: drinking about 1.5 pitchers of fluid / day.  D5w held.   Labs a little better.  No new complaints.       Medications:   Scheduled Meds:apixaban, 2.5 mg, oral, BID  atorvastatin, 40 mg, oral, Nightly  epoetin laura or biosimilar, 8,000 Units, subcutaneous, Weekly  fludrocortisone, 0.1 mg, oral, Every other day  tiotropium, 2 puff, inhalation, Daily   And  fluticasone furoate-vilanteroL, 1 puff, inhalation, Daily  folic acid, 1 mg, oral, Daily  hydrocortisone, 20 mg, oral, q12h GERALD  insulin glargine, 10 Units, subcutaneous, q AM  insulin lispro, 0-10 Units, subcutaneous, TID  levETIRAcetam, 500 mg, oral, BID  melatonin, 5 mg, oral, Nightly  metoprolol tartrate, 25 mg, oral, BID  mycophenolate, 1,000 mg,  "oral, BID  paliperidone, 6 mg, oral, Daily  pantoprazole, 40 mg, oral, Daily  thiamine, 100 mg, oral, Daily  [Held by provider] torsemide, 40 mg, oral, Daily      Continuous Infusions:       CBC:   Lab Results   Component Value Date    HGB 7.8 (L) 06/15/2024    HGB 7.2 (L) 06/15/2024    WBC 5.9 06/15/2024    WBC 3.6 (L) 06/14/2024    PLT 75 (L) 06/15/2024    PLT 73 (L) 06/14/2024      Anemia:    Lab Results   Component Value Date    IRON 157 (H) 06/14/2024    TIBC 220 (L) 06/14/2024      BMP:    Lab Results   Component Value Date     (H) 06/16/2024     06/15/2024    K 4.0 06/16/2024    K 4.3 06/15/2024     (H) 06/16/2024     (H) 06/15/2024    CO2 25 06/16/2024    CO2 25 06/15/2024    BUN 37 (H) 06/16/2024    BUN 39 (H) 06/15/2024    CREATININE 1.46 (H) 06/16/2024    CREATININE 1.65 (H) 06/15/2024      Bone disease: No results found for: \"PHOS\", \"PTH\", \"VITD25\"   Urinalysis:    Lab Results   Component Value Date    KRYSTAL 5.5 06/16/2024    PROTUR 100 (2+) (A) 06/16/2024    GLUCOSEU Normal 06/16/2024    BLOODU 0.03 (TRACE) (A) 06/16/2024    KETONESU NEGATIVE 06/16/2024    BILIRUBINU NEGATIVE 06/16/2024    NITRITEU NEGATIVE 06/16/2024    LEUKOCYTESU 500 Margarita/µL (A) 06/16/2024    UTPCR 1.86 (H) 06/16/2024        Objective:   Vitals: /87 (BP Location: Right arm, Patient Position: Lying)   Pulse 60   Temp 35.6 °C (96.1 °F) (Temporal)   Resp 16   Ht 1.778 m (5' 10\")   Wt 95.3 kg (210 lb)   LMP  (LMP Unknown)   SpO2 95%   BMI 30.13 kg/m²    Wt Readings from Last 3 Encounters:   06/04/24 95.3 kg (210 lb)   05/13/24 102 kg (224 lb 13.9 oz)   04/24/24 90.9 kg (200 lb 6.4 oz)      24HR INTAKE/OUTPUT:    Intake/Output Summary (Last 24 hours) at 6/16/2024 1105  Last data filed at 6/16/2024 0247  Gross per 24 hour   Intake 1920 ml   Output 300 ml   Net 1620 ml     Admission weight:  Weight: 95.3 kg (210 lb)      Constitutional:  Alert, awake, no apparent distress   Skin:normal, no " rash  HEENT:sclera anicteric.  Head atraumatic normocephalic  Neck:supple with no thyromegally  Cardiovascular:  S1, S2 without m/r/g   Respiratory:  CTA B without w/r/r   Abdomen: +bs, soft, nt  Ext: 1+ LE edema  Musculoskeletal:Intact  Neuro:Alert and oriented with no deficit      Electronically signed by Victor Hugo Evans MD on 6/16/2024 at 11:05 AM

## 2024-06-16 NOTE — CARE PLAN
Problem: Skin  Goal: Decreased wound size/increased tissue granulation at next dressing change  6/16/2024 1759 by Bing Cole RN  Outcome: Progressing  Flowsheets (Taken 6/16/2024 1759)  Decreased wound size/increased tissue granulation at next dressing change:   Protective dressings over bony prominences   Promote sleep for wound healing  6/16/2024 1757 by Bing Cole RN  Outcome: Progressing  Flowsheets (Taken 6/15/2024 1925)  Decreased wound size/increased tissue granulation at next dressing change:   Promote sleep for wound healing   Protective dressings over bony prominences     Problem: Skin  Goal: Participates in plan/prevention/treatment measures  6/16/2024 1759 by Bing Cole RN  Outcome: Progressing  Flowsheets (Taken 6/16/2024 1759)  Participates in plan/prevention/treatment measures: Elevate heels  6/16/2024 1757 by Bing Cole RN  Outcome: Progressing  Flowsheets (Taken 6/14/2024 1251 by Radha Yeager RN)  Participates in plan/prevention/treatment measures:   Discuss with provider PT/OT consult   Elevate heels   Increase activity/out of bed for meals     Problem: Skin  Goal: Promote/optimize nutrition  6/16/2024 1759 by Bing Cole RN  Outcome: Progressing  Flowsheets (Taken 6/16/2024 1759)  Promote/optimize nutrition:   Assist with feeding   Consume > 50% meals/supplements   Offer water/supplements/favorite foods  6/16/2024 1757 by Bing Cole RN  Outcome: Progressing  Flowsheets (Taken 6/14/2024 1251 by Radha Yeager RN)  Promote/optimize nutrition:   Monitor/record intake including meals   Consume > 50% meals/supplements     Problem: Skin  Goal: Promote skin healing  6/16/2024 1759 by Bing Cole RN  Outcome: Progressing  Flowsheets (Taken 6/16/2024 1759)  Promote skin healing:   Turn/reposition every 2 hours/use positioning/transfer devices   Protective dressings over bony prominences   Assess skin/pad under line(s)/device(s)  6/16/2024 1757 by Bing  AMINAH Cole  Outcome: Progressing  Flowsheets (Taken 6/14/2024 1251 by Radha Yeager RN)  Promote skin healing:   Assess skin/pad under line(s)/device(s)   Turn/reposition every 2 hours/use positioning/transfer devices   The patient's goals for the shift include no pain    The clinical goals for the shift include Patient will be alert and oriented throughout shift.

## 2024-06-16 NOTE — PROGRESS NOTES
"Bing Holliday is a 63 y.o. female on day 10 of admission has continued to improve with her mental state.    Denies any audiovisual hallucinations or paranoid thoughts  Tolerating the medications without any side effects  No agitation or disturbance in her behavior  Reviewed the notes from Dr. Cabral who spoke with her family and recommended patient to be discharged when medically stable    Labs Reviewed.  Vitals Reviewed.   Nursing Notes Reviewed.   No EPS, TD, vitals stable.      MSE: Patient was alert, but was not oriented to time, place, person and situation.  She was provided orientation.  Patient appears well groomed and clad in climate appropriate clothes. Patient is cooperative on approach.   Recent memory poor and remote memory good. Memory registration and recall fair. Attention and concentration restricted. Speech low in volume and slow in rate.  Fair eye contact. Thought process poverty of thoughts. Impaired associations. Limited fund of knowledge. Mood ok affect flat. Patient did not endorse any delusions. Patient has been experiencing both auditory visual hallucinations intermittently. Patient has denied any active suicidal  ideations and is future oriented.  Patient has improving yet limited insight, limited judgment and fair impulse control.      Musculoskeletal: Patient bedbound and gait could not be assessed, no Parkinsonism, no Dystonia, no Akathisia, no TD. Psychomotor activity within normal limits.     Diagnosis: Psychosis     Assessment and Plan:      Impression:    Medication reviewed. To continue as ordered  Blood test reviewed  Will continue to follow during the stay in the hospital          Last Recorded Vitals  /77   Pulse 62   Temp 35.6 °C (96.1 °F)   Resp 12   Ht 1.778 m (5' 10\")   Wt 95.3 kg (210 lb)   LMP  (LMP Unknown)   SpO2 95%   BMI 30.13 kg/m²      Recent Results (from the past 24 hour(s))   POCT GLUCOSE    Collection Time: 06/15/24  4:06 PM   Result Value Ref Range "    POCT Glucose 135 (H) 74 - 99 mg/dL   POCT GLUCOSE    Collection Time: 06/15/24  7:52 PM   Result Value Ref Range    POCT Glucose 164 (H) 74 - 99 mg/dL   Protein, Urine Random    Collection Time: 06/16/24  1:41 AM   Result Value Ref Range    Total Protein, Urine Random 147 (H) 5 - 24 mg/dL    Creatinine, Urine Random 79.1 20.0 - 320.0 mg/dL    T. Protein/Creatinine Ratio 1.86 (H) 0.00 - 0.17 mg/mg Creat   Urinalysis with Reflex Microscopic    Collection Time: 06/16/24  1:41 AM   Result Value Ref Range    Color, Urine Light-Yellow Light-Yellow, Yellow, Dark-Yellow    Appearance, Urine Clear Clear    Specific Gravity, Urine 1.018 1.005 - 1.035    pH, Urine 5.5 5.0, 5.5, 6.0, 6.5, 7.0, 7.5, 8.0    Protein, Urine 100 (2+) (A) NEGATIVE, 10 (TRACE), 20 (TRACE) mg/dL    Glucose, Urine Normal Normal mg/dL    Blood, Urine 0.03 (TRACE) (A) NEGATIVE    Ketones, Urine NEGATIVE NEGATIVE mg/dL    Bilirubin, Urine NEGATIVE NEGATIVE    Urobilinogen, Urine Normal Normal mg/dL    Nitrite, Urine NEGATIVE NEGATIVE    Leukocyte Esterase, Urine 500 Margarita/µL (A) NEGATIVE   Microscopic Only, Urine    Collection Time: 06/16/24  1:41 AM   Result Value Ref Range    WBC, Urine 6-10 (A) 1-5, NONE /HPF    RBC, Urine 6-10 (A) NONE, 1-2, 3-5 /HPF    Squamous Epithelial Cells, Urine 1-9 (SPARSE) Reference range not established. /HPF    Bacteria, Urine 1+ (A) NONE SEEN /HPF    Mucus, Urine FEW Reference range not established. /LPF    Uric Acid Crystals, Urine 1+ NONE, 1+ /HPF   Comprehensive Metabolic Panel    Collection Time: 06/16/24  7:05 AM   Result Value Ref Range    Glucose 62 (L) 74 - 99 mg/dL    Sodium 147 (H) 136 - 145 mmol/L    Potassium 4.0 3.5 - 5.3 mmol/L    Chloride 115 (H) 98 - 107 mmol/L    Bicarbonate 25 21 - 32 mmol/L    Anion Gap 11 10 - 20 mmol/L    Urea Nitrogen 37 (H) 6 - 23 mg/dL    Creatinine 1.46 (H) 0.50 - 1.05 mg/dL    eGFR 41 (L) >60 mL/min/1.73m*2    Calcium 8.3 (L) 8.6 - 10.3 mg/dL    Albumin 2.8 (L) 3.4 - 5.0 g/dL     Alkaline Phosphatase 152 (H) 33 - 136 U/L    Total Protein 5.2 (L) 6.4 - 8.2 g/dL    AST 16 9 - 39 U/L    Bilirubin, Total 0.3 0.0 - 1.2 mg/dL    ALT 11 7 - 45 U/L   POCT GLUCOSE    Collection Time: 06/16/24  7:16 AM   Result Value Ref Range    POCT Glucose 82 74 - 99 mg/dL   POCT GLUCOSE    Collection Time: 06/16/24 11:13 AM   Result Value Ref Range    POCT Glucose 117 (H) 74 - 99 mg/dL          Current Facility-Administered Medications:     acetaminophen (Tylenol) oral liquid 650 mg, 650 mg, oral, q4h PRN **OR** acetaminophen (Tylenol) tablet 650 mg, 650 mg, oral, q4h PRN, Vivek Orozco MD, 650 mg at 06/06/24 2059    apixaban (Eliquis) tablet 2.5 mg, 2.5 mg, oral, BID, Vivek Orozco MD, 2.5 mg at 06/16/24 0827    atorvastatin (Lipitor) tablet 40 mg, 40 mg, oral, Nightly, Vivek Orozco MD, 40 mg at 06/15/24 2036    dextrose 50 % injection 12.5 g, 12.5 g, intravenous, q15 min PRN, Vivek Orozco MD, 12.5 g at 06/08/24 0913    dextrose 50 % injection 25 g, 25 g, intravenous, q15 min PRN, Vivek Orozco MD    epoetin laura-epbx (Retacrit) injection 8,000 Units, 8,000 Units, subcutaneous, Weekly, Sully Gutiérrez MD, 8,000 Units at 06/15/24 0609    fludrocortisone (Florinef) tablet 0.1 mg, 0.1 mg, oral, Every other day, Vivek Orozoc MD, 0.1 mg at 06/15/24 0904    tiotropium (Spiriva Respimat) 2.5 mcg/actuation inhaler 2 puff, 2 puff, inhalation, Daily, 2 puff at 06/16/24 0624 **AND** fluticasone furoate-vilanteroL (Breo Ellipta) 200-25 mcg/dose inhaler 1 puff, 1 puff, inhalation, Daily, Vivek Orozco MD, 1 puff at 06/16/24 0624    folic acid (Folvite) tablet 1 mg, 1 mg, oral, Daily, Vivek Orozco MD, 1 mg at 06/16/24 0827    glucagon (Glucagen) injection 1 mg, 1 mg, intramuscular, q15 min PRN, Vivek Orozco MD    glucagon (Glucagen) injection 1 mg, 1 mg, intramuscular, q15 min PRN, Vivek Orozco MD    HYDROcodone-acetaminophen (Norco) 5-325 mg  per tablet 1 tablet, 1 tablet, oral, q6h PRN, Cheko Bonilla MD, 1 tablet at 06/08/24 0535    hydrocortisone (Cortef) tablet 20 mg, 20 mg, oral, q12h GERALD, Mela Banuelos MD, 20 mg at 06/16/24 0833    insulin glargine (Lantus) injection 10 Units, 10 Units, subcutaneous, q AM, Kip Felipe MD, 10 Units at 06/16/24 1120    insulin lispro (HumaLOG) injection 0-10 Units, 0-10 Units, subcutaneous, TID, Vivek Orozco MD, 4 Units at 06/14/24 1722    levETIRAcetam (Keppra) tablet 500 mg, 500 mg, oral, BID, Kip Felipe MD, 500 mg at 06/16/24 0827    meclizine (Antivert) tablet 25 mg, 25 mg, oral, q12h PRN, Vivek Orozco MD    melatonin tablet 5 mg, 5 mg, oral, Nightly, Vivek Orozco MD, 5 mg at 06/15/24 2036    metoprolol tartrate (Lopressor) tablet 25 mg, 25 mg, oral, BID, Vivek Orozco MD, 25 mg at 06/16/24 0827    morphine injection 1 mg, 1 mg, intravenous, q4h PRN, Neno Rodarte MD, 1 mg at 06/16/24 1120    mycophenolate (Cellcept) capsule 1,000 mg, 1,000 mg, oral, BID, Vivek Orozco MD, 1,000 mg at 06/16/24 0832    OLANZapine (ZyPREXA) injection 5 mg, 5 mg, intramuscular, q6h PRN, Jose Farias APRN-CNP    paliperidone (Invega) 24 hr tablet 6 mg, 6 mg, oral, Daily, Jaiden Cabral MD, 6 mg at 06/16/24 0831    pantoprazole (ProtoNix) EC tablet 40 mg, 40 mg, oral, Daily, Vivek Orozco MD, 40 mg at 06/16/24 0624    thiamine (Vitamin B-1) tablet 100 mg, 100 mg, oral, Daily, Vivek Orozco MD, 100 mg at 06/16/24 8047       Yaniv Lee MD

## 2024-06-16 NOTE — PROGRESS NOTES
"Bing Holliday is a 63 y.o. female on day 12 of admission presenting with cellulitis        Subjective  No complaints today.     Objective     Last Recorded Vitals  /87 (BP Location: Right arm, Patient Position: Lying)   Pulse 60   Temp 35.6 °C (96.1 °F) (Temporal)   Resp 16   Ht 1.778 m (5' 10\")   Wt 95.3 kg (210 lb)   LMP  (LMP Unknown)   SpO2 95%   BMI 30.13 kg/m²      Intake/Output last 3 Shifts:    Intake/Output Summary (Last 24 hours) at 6/16/2024 1057  Last data filed at 6/16/2024 0247  Gross per 24 hour   Intake 1920 ml   Output 300 ml   Net 1620 ml       Physical Exam   Gen: Aox2  HEENT: EOM, MMM  CV: RRR, no murmurs rubs or gallops  Resp: coarse rhonchi   Abdomen: soft, NT,+BS  LE: No edema      Relevant Results  Lab Results   Component Value Date    WBC 5.9 06/15/2024    HGB 7.8 (L) 06/15/2024    HCT 26.2 (L) 06/15/2024     (H) 06/15/2024    PLT 75 (L) 06/15/2024     Lab Results   Component Value Date    GLUCOSE 62 (L) 06/16/2024    CALCIUM 8.3 (L) 06/16/2024     (H) 06/16/2024    K 4.0 06/16/2024    CO2 25 06/16/2024     (H) 06/16/2024    BUN 37 (H) 06/16/2024    CREATININE 1.46 (H) 06/16/2024     Lab Results   Component Value Date    HGBA1C 6.8 (H) 06/04/2024         Assessment/Plan  63 year old female with history of Lupus, adrenal insuffiencey, lupus,  presented from living facility with cellulitis and worsening confusion     Worsening confusion and pyschosis: neuro and psych consulted  -likely psychosis, psych following started on invega and improved continue, does not need IP psych, needs outpatient follow up    -seen by PT/OT, waiting precert.      Adrenal insufficiency: continue florinef, stim test done, will add PO hydrocortisone BID,     JED on CKD stage 3 with hypernatremia: nephrology following,  -improved and off IV fluids now    Cellulitis: resolved, stop IV Abx, ID signed off     Hx of PAF: on eliquis    Chronic anemia: H/H stable, no signs of bleeding "     DMII: resumed insulin therapy as PO intake improved     DVT ppx: kehinde Felipe MD

## 2024-06-17 VITALS
BODY MASS INDEX: 30.06 KG/M2 | HEIGHT: 70 IN | RESPIRATION RATE: 16 BRPM | SYSTOLIC BLOOD PRESSURE: 106 MMHG | DIASTOLIC BLOOD PRESSURE: 56 MMHG | HEART RATE: 81 BPM | TEMPERATURE: 96.3 F | WEIGHT: 210 LBS | OXYGEN SATURATION: 95 %

## 2024-06-17 LAB
ALBUMIN SERPL BCP-MCNC: 2.8 G/DL (ref 3.4–5)
ALP SERPL-CCNC: 166 U/L (ref 33–136)
ALT SERPL W P-5'-P-CCNC: 14 U/L (ref 7–45)
ANION GAP SERPL CALC-SCNC: 9 MMOL/L (ref 10–20)
AST SERPL W P-5'-P-CCNC: 18 U/L (ref 9–39)
BASOPHILS # BLD AUTO: 0 X10*3/UL (ref 0–0.1)
BASOPHILS NFR BLD AUTO: 0 %
BILIRUB SERPL-MCNC: 0.4 MG/DL (ref 0–1.2)
BUN SERPL-MCNC: 37 MG/DL (ref 6–23)
C3 SERPL-MCNC: 146 MG/DL (ref 87–200)
C4 SERPL-MCNC: 60 MG/DL (ref 10–50)
CALCIUM SERPL-MCNC: 8.6 MG/DL (ref 8.6–10.3)
CHLORIDE SERPL-SCNC: 114 MMOL/L (ref 98–107)
CO2 SERPL-SCNC: 27 MMOL/L (ref 21–32)
CREAT SERPL-MCNC: 1.59 MG/DL (ref 0.5–1.05)
EGFRCR SERPLBLD CKD-EPI 2021: 37 ML/MIN/1.73M*2
EOSINOPHIL # BLD AUTO: 0.02 X10*3/UL (ref 0–0.7)
EOSINOPHIL NFR BLD AUTO: 0.4 %
ERYTHROCYTE [DISTWIDTH] IN BLOOD BY AUTOMATED COUNT: 19.9 % (ref 11.5–14.5)
GLUCOSE BLD MANUAL STRIP-MCNC: 121 MG/DL (ref 74–99)
GLUCOSE BLD MANUAL STRIP-MCNC: 124 MG/DL (ref 74–99)
GLUCOSE BLD MANUAL STRIP-MCNC: 68 MG/DL (ref 74–99)
GLUCOSE SERPL-MCNC: 64 MG/DL (ref 74–99)
HCT VFR BLD AUTO: 26.1 % (ref 36–46)
HGB BLD-MCNC: 7.5 G/DL (ref 12–16)
IMM GRANULOCYTES # BLD AUTO: 0.13 X10*3/UL (ref 0–0.7)
IMM GRANULOCYTES NFR BLD AUTO: 2.7 % (ref 0–0.9)
LYMPHOCYTES # BLD AUTO: 1.38 X10*3/UL (ref 1.2–4.8)
LYMPHOCYTES NFR BLD AUTO: 28.5 %
MAGNESIUM SERPL-MCNC: 1.66 MG/DL (ref 1.6–2.4)
MCH RBC QN AUTO: 29.9 PG (ref 26–34)
MCHC RBC AUTO-ENTMCNC: 28.7 G/DL (ref 32–36)
MCV RBC AUTO: 104 FL (ref 80–100)
MONOCYTES # BLD AUTO: 0.34 X10*3/UL (ref 0.1–1)
MONOCYTES NFR BLD AUTO: 7 %
NEUTROPHILS # BLD AUTO: 2.98 X10*3/UL (ref 1.2–7.7)
NEUTROPHILS NFR BLD AUTO: 61.4 %
NRBC BLD-RTO: 1 /100 WBCS (ref 0–0)
PLATELET # BLD AUTO: 72 X10*3/UL (ref 150–450)
POTASSIUM SERPL-SCNC: 4 MMOL/L (ref 3.5–5.3)
PROT SERPL-MCNC: 5.3 G/DL (ref 6.4–8.2)
RBC # BLD AUTO: 2.51 X10*6/UL (ref 4–5.2)
SODIUM SERPL-SCNC: 146 MMOL/L (ref 136–145)
WBC # BLD AUTO: 4.9 X10*3/UL (ref 4.4–11.3)

## 2024-06-17 PROCEDURE — 2500000004 HC RX 250 GENERAL PHARMACY W/ HCPCS (ALT 636 FOR OP/ED): Performed by: STUDENT IN AN ORGANIZED HEALTH CARE EDUCATION/TRAINING PROGRAM

## 2024-06-17 PROCEDURE — 2500000001 HC RX 250 WO HCPCS SELF ADMINISTERED DRUGS (ALT 637 FOR MEDICARE OP): Performed by: HOSPITALIST

## 2024-06-17 PROCEDURE — 99239 HOSP IP/OBS DSCHRG MGMT >30: CPT | Performed by: STUDENT IN AN ORGANIZED HEALTH CARE EDUCATION/TRAINING PROGRAM

## 2024-06-17 PROCEDURE — 85025 COMPLETE CBC W/AUTO DIFF WBC: CPT | Performed by: INTERNAL MEDICINE

## 2024-06-17 PROCEDURE — 99232 SBSQ HOSP IP/OBS MODERATE 35: CPT | Performed by: PSYCHIATRY & NEUROLOGY

## 2024-06-17 PROCEDURE — 97530 THERAPEUTIC ACTIVITIES: CPT | Mod: GP,CQ

## 2024-06-17 PROCEDURE — 2500000002 HC RX 250 W HCPCS SELF ADMINISTERED DRUGS (ALT 637 FOR MEDICARE OP, ALT 636 FOR OP/ED): Performed by: INTERNAL MEDICINE

## 2024-06-17 PROCEDURE — 86160 COMPLEMENT ANTIGEN: CPT | Mod: ELYLAB | Performed by: INTERNAL MEDICINE

## 2024-06-17 PROCEDURE — 83735 ASSAY OF MAGNESIUM: CPT | Performed by: HOSPITALIST

## 2024-06-17 PROCEDURE — 82947 ASSAY GLUCOSE BLOOD QUANT: CPT

## 2024-06-17 PROCEDURE — 2500000002 HC RX 250 W HCPCS SELF ADMINISTERED DRUGS (ALT 637 FOR MEDICARE OP, ALT 636 FOR OP/ED): Mod: MUE | Performed by: PSYCHIATRY & NEUROLOGY

## 2024-06-17 PROCEDURE — 36415 COLL VENOUS BLD VENIPUNCTURE: CPT | Performed by: INTERNAL MEDICINE

## 2024-06-17 PROCEDURE — 84075 ASSAY ALKALINE PHOSPHATASE: CPT | Performed by: INTERNAL MEDICINE

## 2024-06-17 PROCEDURE — 2500000001 HC RX 250 WO HCPCS SELF ADMINISTERED DRUGS (ALT 637 FOR MEDICARE OP): Performed by: STUDENT IN AN ORGANIZED HEALTH CARE EDUCATION/TRAINING PROGRAM

## 2024-06-17 PROCEDURE — 97535 SELF CARE MNGMENT TRAINING: CPT | Mod: GO

## 2024-06-17 ASSESSMENT — COGNITIVE AND FUNCTIONAL STATUS - GENERAL
MOVING TO AND FROM BED TO CHAIR: A LITTLE
DRESSING REGULAR UPPER BODY CLOTHING: A LITTLE
DAILY ACTIVITIY SCORE: 16
TURNING FROM BACK TO SIDE WHILE IN FLAT BAD: A LOT
MOVING TO AND FROM BED TO CHAIR: A LITTLE
DAILY ACTIVITIY SCORE: 14
EATING MEALS: A LITTLE
CLIMB 3 TO 5 STEPS WITH RAILING: TOTAL
WALKING IN HOSPITAL ROOM: A LITTLE
HELP NEEDED FOR BATHING: A LOT
TOILETING: A LOT
WALKING IN HOSPITAL ROOM: A LITTLE
STANDING UP FROM CHAIR USING ARMS: A LITTLE
MOBILITY SCORE: 14
CLIMB 3 TO 5 STEPS WITH RAILING: TOTAL
PERSONAL GROOMING: A LITTLE
MOVING FROM LYING ON BACK TO SITTING ON SIDE OF FLAT BED WITH BEDRAILS: A LOT
TURNING FROM BACK TO SIDE WHILE IN FLAT BAD: A LOT
DRESSING REGULAR LOWER BODY CLOTHING: A LOT
DRESSING REGULAR UPPER BODY CLOTHING: A LOT
STANDING UP FROM CHAIR USING ARMS: A LITTLE
PERSONAL GROOMING: A LOT
DRESSING REGULAR LOWER BODY CLOTHING: A LOT
TOILETING: A LOT
MOBILITY SCORE: 14
MOVING FROM LYING ON BACK TO SITTING ON SIDE OF FLAT BED WITH BEDRAILS: A LOT
HELP NEEDED FOR BATHING: A LITTLE

## 2024-06-17 ASSESSMENT — PAIN - FUNCTIONAL ASSESSMENT
PAIN_FUNCTIONAL_ASSESSMENT: 0-10

## 2024-06-17 ASSESSMENT — PAIN SCALES - GENERAL
PAINLEVEL_OUTOF10: 0 - NO PAIN

## 2024-06-17 ASSESSMENT — ACTIVITIES OF DAILY LIVING (ADL): HOME_MANAGEMENT_TIME_ENTRY: 18

## 2024-06-17 NOTE — PROGRESS NOTES
Occupational Therapy    OT Treatment    Patient Name: Bing Holliday  MRN: [id number]  Today's Date: 6/17/2024  Time Calculation  Start Time: 1135  Stop Time: 1153  Time Calculation (min): 18 min       Assessment:  Evaluation/Treatment Tolerance: Patient tolerated treatment well  End of Session Communication: Bedside nurse  End of Session Patient Position: Up in chair, Alarm on  Evaluation/Treatment Tolerance: Patient tolerated treatment well  Plan:  Treatment Interventions: ADL retraining, Functional transfer training, UE strengthening/ROM, Endurance training, Cognitive reorientation, Patient/family training  OT Frequency: 2 times per week  OT Discharge Recommendations: Moderate intensity level of continued care  OT - OK to Discharge: Yes (Once medically appropriate.)  Treatment Interventions: ADL retraining, Functional transfer training, UE strengthening/ROM, Endurance training, Cognitive reorientation, Patient/family training    Subjective   Previous Visit Info:  OT Last Visit  OT Received On: 06/17/24  General:  General  Reason for Referral: ADLs  Referred By: Destinee  Prior to Session Communication: Bedside nurse (Cleared for therapy evaluation by RN.)  Patient Position Received: Up in chair, Alarm on  General Comment: Patient seated in the recliner, pleasant and agreeable to OT tx session.  Precautions:  Medical Precautions: Fall precautions  Precautions Comment: Seizure precautions. WBAT.     Pain:  Pain Assessment  Pain Assessment: 0-10  Pain Score: 0 - No pain    Objective    Cognition:  Cognition  Overall Cognitive Status: Within Functional Limits (Flat affect. Increased processing time.)  Orientation Level: Disoriented to situation (Oriented to self, place and month; unable to recall the year.)  Coordination:     Activities of Daily Living: Feeding  Feeding Level of Assistance: Setup (Patient required assistance to open containers on the lunch tray.)                       Bed Mobility/Transfers:    Transfer  1  Technique 1: Sit to stand, Stand to sit  Transfer Device 1:  (FWW)  Trials/Comments 1: Patient completed sit <> stand from the recliner with a FWW at CGA level. Increased time required.    Functional Mobility:  Functional Mobility  Functional Mobility Performed: Yes  Functional Mobility 1  Comments 1: Patient completed functional mobility throughout the room with a FWW at min A level. Slow pace, occasional cues for walker management.     Standing Balance:  Dynamic Standing Balance  Dynamic Standing-Comments: Fair           Outcome Measures:[facility] Daily Activity  Putting on and taking off regular lower body clothing: A lot  Bathing (including washing, rinsing, drying): A little  Putting on and taking off regular upper body clothing: A little  Toileting, which includes using toilet, bedpan or urinal: A lot  Taking care of personal grooming such as brushing teeth: A little  Eating Meals: A little  Daily Activity - Total Score: 16    Education Documentation  Body Mechanics, taught by Fifi Escobedo, OT at 6/17/2024 12:48 PM.  Learner: Patient  Readiness: Acceptance  Method: Explanation  Response: Verbalizes Understanding    EDUCATION:  Education  Individual(s) Educated: Patient  Education Provided: Fall precautons  Patient Response to Education: Patient/Caregiver Verbalized Understanding of Information    Goals:  Encounter Problems       Encounter Problems (Active)       OT Goals       Patient will complete household distance functional mobility with a FWW at CGA level.  (Progressing)       Start:  06/13/24    Expected End:  06/27/24            Patient will complete functional transfers with a FWW at CGA level.  (Progressing)       Start:  06/13/24    Expected End:  06/27/24            Patient will complete lower body dressing at a CGA level.  (Progressing)       Start:  06/13/24    Expected End:  06/27/24            Patient will tolerate standing greater than 3 minutes during functional tasks. (Progressing)        Start:  06/13/24    Expected End:  06/27/24            Patient will demonstrate fair/fair + standing balance during functional tasks.  (Progressing)       Start:  06/13/24    Expected End:  06/27/24

## 2024-06-17 NOTE — PROGRESS NOTES
Renal Progress Note  Assessment:  63 y.o. female with history s/f T2DM, SLE with hx of lupus nephritis class V in 2011, COPD, adrenal insufficiency, Raynauds, RA, CKD stage 3b, depression, pHTN, pA.fib, gout, fibromyalgia, HTN and HLD who presented for abnormal labs.     JED on CKD stage III: ? If in setting of CRS as function improving w/ diuresis, CKD risk factors 2/2 T2DM, HTN, unlikely in setting of SLE, baseline Scr ~1.3-1.4 w/ eGFR low/mid 40s  Fluid overload: improved   Hypomagnesemia   Hypernatremia:   Hypokalemia: corrected      Plan:  -complicated case in terms of chronic management  - her regular rheumatologist is Dr. Castellanos but I don't think she has seen her in a while  - maintained on mycophenolate for lupus and steroids for lupus and adrenal insufficiency  - has chronic pancytopenia  - started on retacrit here  - encourage free water intake - asked to do 2 liters / day  - re quanitfy proteinuria - has over 1g.  May be candidate for lupkynis or benlysta but may need repeat renal biopsy.  Pt has had persistent proteinuria.  Would want her seen back by rheum before deciding.  If her lupus would warrant benlysta anyways, would consider holding off on biopsy  - check complement      Subjective:   Admit Date: 6/4/2024    Interval History: labs mostly stable.  She had d5w added back.  No cp.  No sob      Medications:   Scheduled Meds:apixaban, 2.5 mg, oral, BID  atorvastatin, 40 mg, oral, Nightly  epoetin laura or biosimilar, 8,000 Units, subcutaneous, Weekly  fludrocortisone, 0.1 mg, oral, Every other day  tiotropium, 2 puff, inhalation, Daily   And  fluticasone furoate-vilanteroL, 1 puff, inhalation, Daily  folic acid, 1 mg, oral, Daily  hydrocortisone, 20 mg, oral, q12h GERALD  insulin glargine, 10 Units, subcutaneous, q AM  insulin lispro, 0-10 Units, subcutaneous, TID  levETIRAcetam, 500 mg, oral, BID  melatonin, 5 mg, oral, Nightly  metoprolol tartrate, 25 mg, oral, BID  mycophenolate, 1,000 mg, oral,  "BID  paliperidone, 6 mg, oral, Daily  pantoprazole, 40 mg, oral, Daily  thiamine, 100 mg, oral, Daily      Continuous Infusions:dextrose 5%, 50 mL/hr, Last Rate: 50 mL/hr (06/17/24 0911)          CBC:   Lab Results   Component Value Date    HGB 7.5 (L) 06/17/2024    HGB 7.8 (L) 06/15/2024    WBC 4.9 06/17/2024    WBC 5.9 06/15/2024    PLT 72 (L) 06/17/2024    PLT 75 (L) 06/15/2024      Anemia:    No results found for: \"FERRITIN\", \"IRON\", \"TIBC\"     BMP:    Lab Results   Component Value Date     (H) 06/17/2024     (H) 06/16/2024    K 4.0 06/17/2024    K 4.0 06/16/2024     (H) 06/17/2024     (H) 06/16/2024    CO2 27 06/17/2024    CO2 25 06/16/2024    BUN 37 (H) 06/17/2024    BUN 37 (H) 06/16/2024    CREATININE 1.59 (H) 06/17/2024    CREATININE 1.46 (H) 06/16/2024      Bone disease: No results found for: \"PHOS\", \"PTH\", \"VITD25\"   Urinalysis:    Lab Results   Component Value Date    KRYSTAL 5.5 06/16/2024    PROTUR 100 (2+) (A) 06/16/2024    GLUCOSEU Normal 06/16/2024    BLOODU 0.03 (TRACE) (A) 06/16/2024    KETONESU NEGATIVE 06/16/2024    BILIRUBINU NEGATIVE 06/16/2024    NITRITEU NEGATIVE 06/16/2024    LEUKOCYTESU 500 Margarita/µL (A) 06/16/2024    UTPCR 1.86 (H) 06/16/2024        Objective:   Vitals: /81 (BP Location: Right arm, Patient Position: Lying)   Pulse 60   Temp 35.7 °C (96.3 °F) (Temporal)   Resp 13   Ht 1.778 m (5' 10\")   Wt 95.3 kg (210 lb)   LMP  (LMP Unknown)   SpO2 95%   BMI 30.13 kg/m²    Wt Readings from Last 3 Encounters:   06/04/24 95.3 kg (210 lb)   05/13/24 102 kg (224 lb 13.9 oz)   04/24/24 90.9 kg (200 lb 6.4 oz)      24HR INTAKE/OUTPUT:    Intake/Output Summary (Last 24 hours) at 6/17/2024 1018  Last data filed at 6/17/2024 0911  Gross per 24 hour   Intake 1578.34 ml   Output 400 ml   Net 1178.34 ml     Admission weight:  Weight: 95.3 kg (210 lb)      Constitutional:  Alert, awake, no apparent distress   Skin:normal, no rash  HEENT:sclera anicteric.  Head atraumatic " normocephalic  Neck:supple with no thyromegally  Cardiovascular:  S1, S2 without m/r/g   Respiratory:  CTA B without w/r/r   Abdomen: +bs, soft, nt  Ext: 1+ LE edema  Musculoskeletal:Intact  Neuro:Alert and oriented with no deficit      Electronically signed by Victor Hugo Evans MD on 6/17/2024 at 10:18 AM

## 2024-06-17 NOTE — PROGRESS NOTES
Bing Holliday is a 63 y.o. female on day 13 of admission has continued to improve with her mental state.  She also has been tolerating the Invega 6 mg well.  No rigidity parkinsonism akathisia NMS or tardive dyskinesia was noted during the exam this morning.  She denied experiencing any hallucinations.  She was resting comfortably watching television earlier this morning and slept well last night.  She has denied any suicidal homicidal ideations.  Physically she is feeling better as well Labs Reviewed.  Vitals Reviewed.   Nursing Notes Reviewed.   No EPS, TD, vitals stable.      MSE: Patient was alert, seemed to recognize this examiner somewhat although was not oriented to time, place, but was oriented to person and situation.  She was provided orientation.  Patient appears well groomed and clad in climate appropriate clothes. Patient is cooperative on approach.   Recent memory poor and remote memory fair. Memory registration and recall fair. Attention and concentration restricted. Speech low in volume and slow in rate.  Fair eye contact. Thought process poverty of thoughts. Impaired associations. Limited fund of knowledge. Mood ok affect flat. Patient did not endorse any delusions. Patient is no longer experiencing auditory visual hallucinations . Patient has denied any active suicidal  ideations and is future oriented.  Patient has improving yet limited insight, judgment and fair impulse control.      Musculoskeletal: Patient bedbound and gait could not be assessed, no Parkinsonism, no Dystonia, no Akathisia, no TD. Psychomotor activity within normal limits.     Diagnosis: Psychosis     Assessment and Plan:      Continue Invega 6 mg once a day  Consider giving long-acting injection Invega Sustenna corresponding to the oral dose.  Plan to offer long-acting injection depending on her response to Invega and also monitoring for side effects.     Patient is now cleared from psychiatry standpoint  Psychiatry will  "follow while patient is in the hospital  Continue with current treatment and medication adjustments. Patient is agreeable with continued medication management and adjustments discussed      Last Recorded Vitals  /81 (BP Location: Right arm, Patient Position: Lying)   Pulse 60   Temp 35.7 °C (96.3 °F) (Temporal)   Resp 13   Ht 1.778 m (5' 10\")   Wt 95.3 kg (210 lb)   LMP  (LMP Unknown)   SpO2 95%   BMI 30.13 kg/m²      Recent Results (from the past 24 hour(s))   Comprehensive Metabolic Panel    Collection Time: 06/16/24  7:05 AM   Result Value Ref Range    Glucose 62 (L) 74 - 99 mg/dL    Sodium 147 (H) 136 - 145 mmol/L    Potassium 4.0 3.5 - 5.3 mmol/L    Chloride 115 (H) 98 - 107 mmol/L    Bicarbonate 25 21 - 32 mmol/L    Anion Gap 11 10 - 20 mmol/L    Urea Nitrogen 37 (H) 6 - 23 mg/dL    Creatinine 1.46 (H) 0.50 - 1.05 mg/dL    eGFR 41 (L) >60 mL/min/1.73m*2    Calcium 8.3 (L) 8.6 - 10.3 mg/dL    Albumin 2.8 (L) 3.4 - 5.0 g/dL    Alkaline Phosphatase 152 (H) 33 - 136 U/L    Total Protein 5.2 (L) 6.4 - 8.2 g/dL    AST 16 9 - 39 U/L    Bilirubin, Total 0.3 0.0 - 1.2 mg/dL    ALT 11 7 - 45 U/L   POCT GLUCOSE    Collection Time: 06/16/24  7:16 AM   Result Value Ref Range    POCT Glucose 82 74 - 99 mg/dL   POCT GLUCOSE    Collection Time: 06/16/24 11:13 AM   Result Value Ref Range    POCT Glucose 117 (H) 74 - 99 mg/dL   POCT GLUCOSE    Collection Time: 06/16/24  4:27 PM   Result Value Ref Range    POCT Glucose 83 74 - 99 mg/dL   POCT GLUCOSE    Collection Time: 06/16/24  8:36 PM   Result Value Ref Range    POCT Glucose 56 (L) 74 - 99 mg/dL   POCT GLUCOSE    Collection Time: 06/16/24  8:53 PM   Result Value Ref Range    POCT Glucose 145 (H) 74 - 99 mg/dL   POCT GLUCOSE    Collection Time: 06/16/24 11:10 PM   Result Value Ref Range    POCT Glucose 98 74 - 99 mg/dL   Comprehensive Metabolic Panel    Collection Time: 06/17/24  5:48 AM   Result Value Ref Range    Glucose 64 (L) 74 - 99 mg/dL    Sodium 146 (H) " 136 - 145 mmol/L    Potassium 4.0 3.5 - 5.3 mmol/L    Chloride 114 (H) 98 - 107 mmol/L    Bicarbonate 27 21 - 32 mmol/L    Anion Gap 9 (L) 10 - 20 mmol/L    Urea Nitrogen 37 (H) 6 - 23 mg/dL    Creatinine 1.59 (H) 0.50 - 1.05 mg/dL    eGFR 37 (L) >60 mL/min/1.73m*2    Calcium 8.6 8.6 - 10.3 mg/dL    Albumin 2.8 (L) 3.4 - 5.0 g/dL    Alkaline Phosphatase 166 (H) 33 - 136 U/L    Total Protein 5.3 (L) 6.4 - 8.2 g/dL    AST 18 9 - 39 U/L    Bilirubin, Total 0.4 0.0 - 1.2 mg/dL    ALT 14 7 - 45 U/L   Magnesium    Collection Time: 06/17/24  5:48 AM   Result Value Ref Range    Magnesium 1.66 1.60 - 2.40 mg/dL   CBC and Auto Differential    Collection Time: 06/17/24  5:48 AM   Result Value Ref Range    WBC 4.9 4.4 - 11.3 x10*3/uL    nRBC 1.0 (H) 0.0 - 0.0 /100 WBCs    RBC 2.51 (L) 4.00 - 5.20 x10*6/uL    Hemoglobin 7.5 (L) 12.0 - 16.0 g/dL    Hematocrit 26.1 (L) 36.0 - 46.0 %     (H) 80 - 100 fL    MCH 29.9 26.0 - 34.0 pg    MCHC 28.7 (L) 32.0 - 36.0 g/dL    RDW 19.9 (H) 11.5 - 14.5 %    Platelets 72 (L) 150 - 450 x10*3/uL    Neutrophils % 61.4 40.0 - 80.0 %    Immature Granulocytes %, Automated 2.7 (H) 0.0 - 0.9 %    Lymphocytes % 28.5 13.0 - 44.0 %    Monocytes % 7.0 2.0 - 10.0 %    Eosinophils % 0.4 0.0 - 6.0 %    Basophils % 0.0 0.0 - 2.0 %    Neutrophils Absolute 2.98 1.20 - 7.70 x10*3/uL    Immature Granulocytes Absolute, Automated 0.13 0.00 - 0.70 x10*3/uL    Lymphocytes Absolute 1.38 1.20 - 4.80 x10*3/uL    Monocytes Absolute 0.34 0.10 - 1.00 x10*3/uL    Eosinophils Absolute 0.02 0.00 - 0.70 x10*3/uL    Basophils Absolute 0.00 0.00 - 0.10 x10*3/uL          Current Facility-Administered Medications:     acetaminophen (Tylenol) oral liquid 650 mg, 650 mg, oral, q4h PRN **OR** acetaminophen (Tylenol) tablet 650 mg, 650 mg, oral, q4h PRN, Vivek Orozco MD, 650 mg at 06/06/24 2059    apixaban (Eliquis) tablet 2.5 mg, 2.5 mg, oral, BID, Vivek Orozco MD, 2.5 mg at 06/16/24 2034    atorvastatin  (Lipitor) tablet 40 mg, 40 mg, oral, Nightly, Vivek Orozco MD, 40 mg at 06/16/24 2034    dextrose 5% infusion, 50 mL/hr, intravenous, Continuous, Quin Stern MD, Last Rate: 50 mL/hr at 06/17/24 0506, 50 mL/hr at 06/17/24 0506    dextrose 50 % injection 12.5 g, 12.5 g, intravenous, q15 min PRN, Vivek Orozco MD, 12.5 g at 06/16/24 2039    dextrose 50 % injection 25 g, 25 g, intravenous, q15 min PRN, Vivek Orozco MD    epoetin laura-epbx (Retacrit) injection 8,000 Units, 8,000 Units, subcutaneous, Weekly, Sully Gutiérrez MD, 8,000 Units at 06/15/24 0609    fludrocortisone (Florinef) tablet 0.1 mg, 0.1 mg, oral, Every other day, Vivek Orozco MD, 0.1 mg at 06/15/24 0904    tiotropium (Spiriva Respimat) 2.5 mcg/actuation inhaler 2 puff, 2 puff, inhalation, Daily, 2 puff at 06/17/24 0616 **AND** fluticasone furoate-vilanteroL (Breo Ellipta) 200-25 mcg/dose inhaler 1 puff, 1 puff, inhalation, Daily, Vivek Orozco MD, 1 puff at 06/17/24 0616    folic acid (Folvite) tablet 1 mg, 1 mg, oral, Daily, Vivek Orozco MD, 1 mg at 06/16/24 0827    glucagon (Glucagen) injection 1 mg, 1 mg, intramuscular, q15 min PRN, Vivek Orozco MD    glucagon (Glucagen) injection 1 mg, 1 mg, intramuscular, q15 min PRN, Vivek Orozco MD    HYDROcodone-acetaminophen (Norco) 5-325 mg per tablet 1 tablet, 1 tablet, oral, q6h PRN, Cheko Bonilla MD, 1 tablet at 06/08/24 0535    hydrocortisone (Cortef) tablet 20 mg, 20 mg, oral, q12h GERALD, Mela Banuelos MD, 20 mg at 06/16/24 2034    insulin glargine (Lantus) injection 10 Units, 10 Units, subcutaneous, q AM, Kip Felipe MD, 10 Units at 06/16/24 1120    insulin lispro (HumaLOG) injection 0-10 Units, 0-10 Units, subcutaneous, TID, Vivek Orozco MD, 4 Units at 06/14/24 1722    levETIRAcetam (Keppra) tablet 500 mg, 500 mg, oral, BID, Kip Felipe MD, 500 mg at 06/16/24 2034    meclizine (Antivert) tablet 25 mg, 25 mg, oral,  q12h PRN, Vivek Orozco MD    melatonin tablet 5 mg, 5 mg, oral, Nightly, Vivek Orozco MD, 5 mg at 06/16/24 2034    metoprolol tartrate (Lopressor) tablet 25 mg, 25 mg, oral, BID, Vivek Orozco MD, 25 mg at 06/16/24 2034    morphine injection 1 mg, 1 mg, intravenous, q4h PRN, Neno Rodarte MD, 1 mg at 06/16/24 1120    mycophenolate (Cellcept) capsule 1,000 mg, 1,000 mg, oral, BID, Vivek Orozco MD, 1,000 mg at 06/16/24 2034    OLANZapine (ZyPREXA) injection 5 mg, 5 mg, intramuscular, q6h PRN, JIM Garcia-CNP    paliperidone (Invega) 24 hr tablet 6 mg, 6 mg, oral, Daily, Jaiden Cabral MD, 6 mg at 06/16/24 0831    pantoprazole (ProtoNix) EC tablet 40 mg, 40 mg, oral, Daily, Vivek Orozco MD, 40 mg at 06/17/24 0617    thiamine (Vitamin B-1) tablet 100 mg, 100 mg, oral, Daily, Vivek Orozco MD, 100 mg at 06/16/24 0827       Jaiden Cabral MD

## 2024-06-17 NOTE — CARE PLAN
The patient's goals for the shift include no pain    The clinical goals for the shift include Blood sugar maintained WNL      Problem: Nutrition  Goal: Less than 5 days NPO/clear liquids  Outcome: Progressing  Goal: Oral intake greater than 50%  Outcome: Progressing  Goal: Oral intake greater 75%  Outcome: Progressing  Goal: Consume prescribed supplement  Outcome: Progressing  Goal: Adequate PO fluid intake  Outcome: Progressing  Goal: Nutrition support goals are met within 48 hrs  Outcome: Progressing  Goal: Nutrition support is meeting 75% of nutrient needs  Outcome: Progressing  Goal: Tube feed tolerance  Outcome: Progressing  Goal: BG  mg/dL  Outcome: Progressing  Goal: Lab values WNL  Outcome: Progressing  Goal: Electrolytes WNL  Outcome: Progressing  Goal: Promote healing  Outcome: Progressing  Goal: Maintain stable weight  Outcome: Progressing  Goal: Reduce weight from edema/fluid  Outcome: Progressing  Goal: Gradual weight gain  Outcome: Progressing  Goal: Improve ostomy output  Outcome: Progressing     Problem: Pain  Goal: Turns in bed with improved pain control throughout the shift  Outcome: Progressing  Goal: Walks with improved pain control throughout the shift  Outcome: Progressing  Goal: Performs ADL's with improved pain control throughout shift  Outcome: Progressing  Goal: Participates in PT with improved pain control throughout the shift  Outcome: Progressing  Goal: Free from opioid side effects throughout the shift  Outcome: Progressing  Goal: Free from acute confusion related to pain meds throughout the shift  Outcome: Progressing     Problem: Psychosocial Needs  Goal: Demonstrates ability to cope with hospitalization/illness  Outcome: Progressing  Goal: Collaborate with me, my family, and caregiver to identify my specific goals  Outcome: Progressing     Problem: Skin  Goal: Decreased wound size/increased tissue granulation at next dressing change  Outcome: Progressing  Flowsheets (Taken  6/17/2024 0909)  Decreased wound size/increased tissue granulation at next dressing change:   Promote sleep for wound healing   Protective dressings over bony prominences  Goal: Participates in plan/prevention/treatment measures  Outcome: Progressing  Flowsheets (Taken 6/17/2024 0909)  Participates in plan/prevention/treatment measures:   Discuss with provider PT/OT consult   Increase activity/out of bed for meals   Elevate heels  Goal: Promote/optimize nutrition  Outcome: Progressing  Flowsheets (Taken 6/17/2024 0909)  Promote/optimize nutrition:   Monitor/record intake including meals   Consume > 50% meals/supplements   Offer water/supplements/favorite foods  Goal: Promote skin healing  Outcome: Progressing  Flowsheets (Taken 6/17/2024 0909)  Promote skin healing:   Assess skin/pad under line(s)/device(s)   Turn/reposition every 2 hours/use positioning/transfer devices     Problem: Diabetes  Goal: Decrease in ketones present in urine by end of shift  Outcome: Progressing  Goal: Maintain electrolyte levels within acceptable range throughout shift  Outcome: Progressing  Goal: Maintain glucose levels >70mg/dl to <250mg/dl throughout shift  Outcome: Progressing  Goal: No changes in neurological exam by end of shift  Outcome: Progressing  Goal: Learn about and adhere to nutrition recommendations by end of shift  Outcome: Progressing  Goal: Vital signs within normal range for age by end of shift  Outcome: Progressing  Goal: Increase self care and/or family involovement by end of shift  Outcome: Progressing  Goal: Receive DSME education by end of shift  Outcome: Progressing

## 2024-06-17 NOTE — PROGRESS NOTES
Physical Therapy    Physical Therapy Treatment    Patient Name: Bing Holliday  MRN: [id number]  Today's Date: 6/17/2024  Time Calculation  Start Time: 1033  Stop Time: 1047  Time Calculation (min): 14 min       Assessment/Plan   PT Assessment  PT Assessment Results: Decreased strength, Decreased endurance, Impaired balance, Decreased mobility, Impaired judgement, Decreased safety awareness  Rehab Prognosis: Good  Treatment Tolerance: Patient tolerated treatment well, Patient limited by fatigue  Medical Staff Made Aware: Yes  Strengths: Housing layout  Barriers to Participation: Comorbidities  End of Session Communication: Bedside nurse, PCT/NA/CTA  Assessment Comment: Patient continues to require (A) for safety with transfers/amb. Patient will benefit from additional PT to address deficits and improve mobility.  End of Session Patient Position: Up in chair, Alarm on (Call light, phone, and tray table within reach.)  PT Plan  Inpatient/Swing Bed or Outpatient: Inpatient  Treatment/Interventions: Bed mobility, Transfer training, Gait training, Balance training, Strengthening, Endurance training, Therapeutic exercise  PT Plan: Ongoing PT  PT Frequency: 3 times per week  PT Discharge Recommendations: Moderate intensity level of continued care    PT Recommended Transfer Status: Assist x1-2, Assistive device    General Visit Information:   PT  Visit  PT Received On: 06/17/24  General  Family/Caregiver Present: No  Prior to Session Communication: Bedside nurse, PCT/NA/CTA  Patient Position Received: Up in chair, Alarm on  General Comment: Pleasant and cooperative.    General Observations:   General Observation: IV; Purewick(removed for mobility); No tele.    Subjective     Precautions:  Precautions  UE Weight Bearing Status: Weight Bearing as Tolerated (per DPM)  Medical Precautions: Fall precautions  Precautions Comment: Seizure Preacautions. Nursing ok with patient sitting up in the recliner.    Vital Signs:  Vital  Signs  Heart Rate: 81  SpO2: 95 % (on RA.)    Objective     Pain:  Pain Assessment  Pain Assessment: 0-10  Pain Score: 0 - No pain    Cognition:  Cognition  Orientation Level: Disoriented to place, Disoriented to time, Disoriented to situation  Processing Speed: Delayed    Balance:   Static Sitting Balance  Static Sitting-Comment/Number of Minutes: F+  Dynamic Sitting Balance  Dynamic Sitting-Comments: F+  Static Standing Balance  Static Standing-Comment/Number of Minutes: F with ww  Dynamic Standing Balance  Dynamic Standing-Comments: F- with ww    Treatments:  Therapeutic Exercise  Therapeutic Exercise Performed:  (Reviewed seated ther ex(AP/LAQ/Hipflex), but patient did not formally perform multiple reps.)      Ambulation/Gait Training  Ambulation/Gait Training Performed: Yes  Ambulation/Gait Training 1  Surface 1: Level tile  Device 1: Rolling walker  Gait Support Devices: Gait belt  Assistance 1: Minimum assistance  Comments/Distance (ft) 1: ~35ft x2. (A) to don shoes prior to amb. NBOS. Slow aaron. Forward flexed posture. Step through gait pattern. Decreased step height/length B. (L)ankle ROM is limited; tends to be in dorsiflexed position with no foot flat to toe-off transition. v/c and (A) for safer maneuvering of ww with 90/180° turns to avoid entanglement with IV line.  Transfers  Transfer: Yes  Transfer 1  Technique 1: Sit to stand, Stand to sit  Transfer Device 1:  (ww)  Transfer Level of Assistance 1: Minimum assistance  Trials/Comments 1: (2x). v/c for safe hand placement and technique. Slow transition of hands to/from ww. Benefits from elevated surfaces.          Outcome Measures:  [facility] Basic Mobility  Turning from your back to your side while in a flat bed without using bedrails: A lot  Moving from lying on your back to sitting on the side of a flat bed without using bedrails: A lot  Moving to and from bed to chair (including a wheelchair): A little  Standing up from a chair using your arms (e.g.  wheelchair or bedside chair): A little  To walk in hospital room: A little  Climbing 3-5 steps with railing: Total  Basic Mobility - Total Score: 14    Education Documentation  Mobility Training, taught by Maria C Moreno PTA at 6/17/2024  3:01 PM.  Learner: Patient  Readiness: Acceptance  Method: Explanation, Demonstration  Response: Verbalizes Understanding, Needs Reinforcement  Comment: See therapy note.    EDUCATION:  Individual(s) Educated: Patient  Education Provided: Body Mechanics, Fall Risk, Home Exercise Program, POC, Posture (Balance; Safety with /transfers/amb.)  Patient Response to Education: Patient/Caregiver Verbalized Understanding of Information    Encounter Problems       Encounter Problems (Active)       PT Problem       Pt. will transfer supine/sit with CGA (Progressing)       Start:  06/13/24    Expected End:  06/27/24            Pt. will transfer sit/stand with FWW with CGA (Progressing)       Start:  06/13/24    Expected End:  06/27/24            Pt. will complete stand pivot transfers with FWW with MIN A x 1 (Progressing)       Start:  06/13/24    Expected End:  06/27/24            Pt.will ambulate 40' with FWW with MIN A x 1 (Progressing)       Start:  06/13/24    Expected End:  06/27/24            Pt. will perform 2 x 15 B LE AROM exercises  (Progressing)       Start:  06/13/24    Expected End:  06/27/24

## 2024-06-17 NOTE — DISCHARGE SUMMARY
Discharge Diagnosis  Cellulitis of leg without foot  JED/CKD  Uncontrolled hyperglycemia with history of diabetes  Persistent proteinuria  Chronic hypernatremia  Chronic anemia  Electrolyte abnormalities: Hypomagnesemia, hypokalemia    Issues Requiring Follow-Up  Outpatient follow-up with rheumatology for history of lupus  Outpatient follow-up with nephrology for CKD in the setting of lupus and hypernatremia  Outpatient follow-up with endocrinology for history of diabetes  Outpatient follow-up with wound care clinic for bilateral lower extremity wounds      Discharge Meds     Your medication list        START taking these medications        Instructions Last Dose Given Next Dose Due   paliperidone 6 mg 24 hr tablet  Commonly known as: Invega      Take 1 tablet (6 mg) by mouth once daily. Do not crush, chew, or split.              CHANGE how you take these medications        Instructions Last Dose Given Next Dose Due   atorvastatin 40 mg tablet  Commonly known as: Lipitor  What changed: when to take this      Take 1 tablet (40 mg) by mouth once daily.       hydrocortisone 20 mg tablet  Commonly known as: Cortef  What changed: Another medication with the same name was changed. Make sure you understand how and when to take each.      Take 1 tablet (20 mg) by mouth once daily.       hydrocortisone 20 mg tablet  Commonly known as: Cortef  What changed:   medication strength  how much to take  when to take this      Take 1 tablet (20 mg) by mouth every 12 hours.              CONTINUE taking these medications        Instructions Last Dose Given Next Dose Due   acetaminophen 325 mg tablet  Commonly known as: Tylenol           albuterol 90 mcg/actuation inhaler           apixaban 2.5 mg tablet  Commonly known as: Eliquis      Take 1 tablet (2.5 mg) by mouth 2 times a day.       BOOST GLUCOSE CONTROL ORAL           Dexcom G6  misc  Generic drug: blood-glucose meter,continuous      Use as instructed       Dexcom G6  "Sensor device  Generic drug: blood-glucose sensor      Use to check sugars 3 times daily       Dexcom G6 Transmitter device  Generic drug: blood-glucose transmitter device      Use as instructed       fludrocortisone 0.1 mg tablet  Commonly known as: Florinef      Take 1 tablet (0.1 mg) by mouth every other day.       folic acid 1 mg tablet  Commonly known as: Folvite      TAKE 1 TABLET BY MOUTH EVERY DAY       furosemide 20 mg tablet  Commonly known as: Lasix           Glucagon (HCl) Emergency Kit 1 mg recon soln  Generic drug: glucagon HCL           insulin lispro 100 unit/mL injection  Commonly known as: HumaLOG           Lantus U-100 Insulin 100 unit/mL injection  Generic drug: insulin glargine           levETIRAcetam 500 mg tablet  Commonly known as: Keppra      Take 1 tablet (500 mg) by mouth every 12 hours.       levoFLOXacin 500 mg tablet  Commonly known as: Levaquin           meclizine 25 mg tablet  Commonly known as: Antivert           melatonin 5 mg tablet           metoprolol tartrate 25 mg tablet  Commonly known as: Lopressor      Take 1 tablet (25 mg) by mouth 2 times a day.       Mucinex 600 mg 12 hr tablet  Generic drug: guaiFENesin           multivitamin with minerals tablet           mycophenolate 500 mg tablet  Commonly known as: Cellcept      TAKE 2 TABLETS BY MOUTH TWICE A DAY       pantoprazole 40 mg EC tablet  Commonly known as: ProtoNix      TAKE 1 TABLET BY MOUTH EVERY DAY       pen needle, diabetic 31 gauge x 5/16\" needle      Use to inject 1-4 times daily as directed.       potassium chloride CR 20 mEq ER tablet  Commonly known as: Klor-Con M20           thiamine 100 mg tablet  Commonly known as: Vitamin B-1           Trelegy Ellipta 200-62.5-25 mcg blister with device  Generic drug: fluticasone-umeclidin-vilanter      Inhale 1 puff once daily.       Venelex ointment  Generic drug: balsam peru-castor oiL                  STOP taking these medications      atovaquone 750 mg/5 mL " suspension  Commonly known as: Mepron        magnesium oxide 400 mg (241.3 mg magnesium) tablet  Commonly known as: Mag-Ox        OLANZapine 5 mg tablet  Commonly known as: ZyPREXA        OLANZapine 7.5 mg tablet  Commonly known as: ZyPREXA                  Where to Get Your Medications        Information about where to get these medications is not yet available    Ask your nurse or doctor about these medications  hydrocortisone 20 mg tablet  paliperidone 6 mg 24 hr tablet         Test Results Pending At Discharge  Pending Labs       No current pending labs.            Hospital Course  Bing Holliday is a 63 y.o. female from a [facility] facility with a past medical history of adrenocortical insufficiency, generalized weakness, hyperparathyroidism, lupus, diabetes, COPD, protein calorie malnutrition, anxiety, Raynaud's, iron deficiency anemia, rheumatoid arthritis, chronic kidney disease, depression, pulmonary hypertension, cardiomyopathy, paroxysmal A-fib on Eliquis, gout, fibromyalgia, hypertension, hyperlipidemia, who was admitted for management of abnormal lab workup and bilateral lower extremity wounds and swelling  Hypernatremia is chronic      Patient was placed on telemetry monitor, pain management, antiemetic, oxygen therapy as needed, Fall/Seizure precaution, IV Abx, ID was also following, cellulitis resolved, currently off Abx.   Wound care was also following, recommended outpatient follow up with podiatry.    Nephrology was following patient, complicated case in terms of chronic management, maintained on mycophenolate for lupus and steroids for adrenal insufficiency.  Started on Retacrit here.  Due to persistent proteinuria may be a good candidate for lupkynis or benlysta but may need repeat renal biopsy.  Patient needs another appointment with rheumatologist before renal biopsy.  If her lupus would warrant Benlysta would consider holding off on biopsy.  Monitored renal function, avoided nephrotoxic  agent  Monitored CBC daily.   Magnesium and potassium was replaced, back to normal   Adrenal insufficiency: continued florinef, stim test done, added PO hydrocortisone BID.   Elevated Trope likely demand ischemia in the setting of JED, patient was asymptomatic  ISS, hypoglycemia protocol, POC glucose, DM diet, was also considered.  Endocrinology was also following, recommended outpatient follow-up.  Continued with home meds for other comorbidities  Was continued on Eliquis for history of paroxysmal A-fib.  VTE/GI prophylaxis: Eliquis/Protonix    Currently patient is hemodynamically stable and ready for discharge to SNF for better recovery.  Lantus was discontinued by endocrinologist due to episodes of hypoglycemia.  Patient will be continued on the adjusted dose of meds here in the hospital while in SNF.  She will also follow-up as outpatient with nephrology clinic, rheumatology, cardiology, wound care clinic, her primary care for further recommendation and medication adjustment.    Pertinent Physical Exam At Time of Discharge  Physical Exam    General: Well-developed ill-appearing female, in no acute distress  HEENT: AT, NC, no JVD, no lymphadenopathy, neck supple  Lungs: Clear, no wheezing, no crackles  Cardiac: Normal S1-S2, no murmur, no gallop, pacemaker intact  Abdomen: Soft, nontender, no distention, positive bowel sound  Extremities: Bilateral lower extremity wounds wrapped, bilateral lower extremity 1+ nonpitting edema noted, bilateral hands and feet deformity noted  Neurological: Alert awake oriented x3, sensation intact, clear speech    Outpatient Follow-Up  Future Appointments   Date Time Provider Department Center   8/13/2024 10:30 AM Michael Arenas MD [id number] [place]   8/21/2024  1:20 PM [facility] [address]   8/21/2024  2:15 PM Antonio Rodriges MD [id number] [place]       Time spent 40 minutes  Vivek Orozco MD

## 2024-06-17 NOTE — CARE PLAN
The patient's goals for the shift include no pain    The clinical goals for the shift include Blood sugar maintained WNL      Problem: Nutrition  Goal: Less than 5 days NPO/clear liquids  Outcome: Progressing  Goal: Oral intake greater than 50%  Outcome: Progressing  Goal: Oral intake greater 75%  Outcome: Progressing  Goal: Consume prescribed supplement  Outcome: Progressing  Goal: Adequate PO fluid intake  Outcome: Progressing  Goal: Nutrition support goals are met within 48 hrs  Outcome: Progressing  Goal: Nutrition support is meeting 75% of nutrient needs  Outcome: Progressing  Goal: Tube feed tolerance  Outcome: Progressing  Goal: BG  mg/dL  Outcome: Progressing  Goal: Lab values WNL  Outcome: Progressing  Goal: Electrolytes WNL  Outcome: Progressing  Goal: Promote healing  Outcome: Progressing  Goal: Maintain stable weight  Outcome: Progressing  Goal: Reduce weight from edema/fluid  Outcome: Progressing  Goal: Gradual weight gain  Outcome: Progressing  Goal: Improve ostomy output  Outcome: Progressing     Problem: Discharge Planning  Goal: Discharge to home or other facility with appropriate resources  Outcome: Progressing     Problem: Chronic Conditions and Co-morbidities  Goal: Patient's chronic conditions and co-morbidity symptoms are monitored and maintained or improved  Outcome: Progressing     Problem: Pain  Goal: Turns in bed with improved pain control throughout the shift  Outcome: Progressing  Goal: Walks with improved pain control throughout the shift  Outcome: Progressing  Goal: Performs ADL's with improved pain control throughout shift  Outcome: Progressing  Goal: Participates in PT with improved pain control throughout the shift  Outcome: Progressing  Goal: Free from opioid side effects throughout the shift  Outcome: Progressing  Goal: Free from acute confusion related to pain meds throughout the shift  Outcome: Progressing     Problem: Psychosocial Needs  Goal: Demonstrates ability to cope  with hospitalization/illness  Outcome: Progressing  Goal: Collaborate with me, my family, and caregiver to identify my specific goals  Outcome: Progressing     Problem: Discharge Barriers  Goal: My discharge needs are met  Outcome: Progressing     Problem: Skin  Goal: Decreased wound size/increased tissue granulation at next dressing change  Outcome: Progressing  Flowsheets (Taken 6/16/2024 2121)  Decreased wound size/increased tissue granulation at next dressing change: Promote sleep for wound healing  Goal: Participates in plan/prevention/treatment measures  Outcome: Progressing  Flowsheets (Taken 6/16/2024 2121)  Participates in plan/prevention/treatment measures: Increase activity/out of bed for meals  Goal: Promote/optimize nutrition  Outcome: Progressing  Flowsheets (Taken 6/16/2024 2121)  Promote/optimize nutrition:   Assist with feeding   Offer water/supplements/favorite foods   Monitor/record intake including meals   Consume > 50% meals/supplements  Goal: Promote skin healing  Outcome: Progressing     Problem: Diabetes  Goal: Decrease in ketones present in urine by end of shift  Outcome: Progressing  Goal: Maintain electrolyte levels within acceptable range throughout shift  Outcome: Progressing  Goal: Maintain glucose levels >70mg/dl to <250mg/dl throughout shift  Outcome: Progressing  Goal: No changes in neurological exam by end of shift  Outcome: Progressing  Goal: Learn about and adhere to nutrition recommendations by end of shift  Outcome: Progressing  Goal: Vital signs within normal range for age by end of shift  Outcome: Progressing  Goal: Increase self care and/or family involovement by end of shift  Outcome: Progressing  Goal: Receive DSME education by end of shift  Outcome: Progressing     Problem: Fall/Injury  Goal: Not fall by end of shift  Outcome: Progressing  Goal: Be free from injury by end of the shift  Outcome: Progressing  Goal: Verbalize understanding of personal risk factors for fall in  the hospital  Outcome: Progressing  Goal: Verbalize understanding of risk factor reduction measures to prevent injury from fall in the home  Outcome: Progressing  Goal: Use assistive devices by end of the shift  Outcome: Progressing  Goal: Pace activities to prevent fatigue by end of the shift  Outcome: Progressing

## 2024-06-17 NOTE — PROGRESS NOTES
"Nutrition Follow Up Assessment:   Nutrition Assessment         Patient is a 63 y.o. female presenting with cellulitis of leg without foot      Nutrition History:  Energy Intake: Good > 75 %  Food and Nutrient History: RD follow-up. Pt reporting good appetite. Nursing documented consumed 100% of breakfast. Likes vanilla Glucerna, wishes to continue to receive TID. Denies other nutritional concerns at this time.  Food Allergies/Intolerances:  None  GI Symptoms: None  Oral Problems: None       Anthropometrics:  Height: 177.8 cm (5' 10\")   Weight: 95.3 kg (210 lb)   BMI (Calculated): 30.13               Weight Change %:  Weight History / % Weight Change: no new wt since last RD note    Nutrition Focused Physical Exam Findings:  defer: previously completed    Edema:  Edema Location: non-pitting BLE per nursing assessment  Physical Findings:  Skin: Positive (skin tear, multiple pretibial wounds)    Nutrition Significant Labs:  BMP Trend:   Results from last 7 days   Lab Units 06/17/24  0548 06/16/24  0705 06/15/24  0623 06/14/24  0626   GLUCOSE mg/dL 64* 62* 90 66*   CALCIUM mg/dL 8.6 8.3* 8.3* 8.8   SODIUM mmol/L 146* 147* 143 146*   POTASSIUM mmol/L 4.0 4.0 4.3 3.4*   CO2 mmol/L 27 25 25 30   CHLORIDE mmol/L 114* 115* 111* 110*   BUN mg/dL 37* 37* 39* 39*   CREATININE mg/dL 1.59* 1.46* 1.65* 1.49*        Nutrition Specific Medications:  Reviewed    I/O:   Last BM Date: 06/14/24; Stool Appearance: Soft, Loose (06/14/24 1330)    Dietary Orders (From admission, onward)       Start     Ordered    06/12/24 1357  Oral nutritional supplements  Until discontinued        Comments: Vanilla   Question Answer Comment   Deliver with All meals    Select supplement: Glucerna Shake        06/12/24 1356    06/04/24 1648  Adult diet Carb Controlled; 75 gram carb/meal, 45 gram Carb evening snack  Diet effective now        Question Answer Comment   Diet type Carb Controlled    Carb diet selection: 75 gram carb/meal, 45 gram Carb evening " snack        06/04/24 1647                     Estimated Needs:   Total Energy Estimated Needs (kCal): 1906 kCal  Method for Estimating Needs: 20 kcal/kg ABW  Total Protein Estimated Needs (g): 76 g  Method for Estimating Needs: 61-82 g (0.6-0.8 g/kg ABW) or as renal function allows  Total Fluid Estimated Needs (mL): 1906 mL  Method for Estimating Needs: 1 ml/kcal or per MD        Nutrition Diagnosis   Malnutrition Diagnosis  Patient has Malnutrition Diagnosis: Yes  Diagnosis Status: Ongoing  Malnutrition Diagnosis: Moderate malnutrition related to chronic disease or condition  As Evidenced by: >5% wt loss x 1 month, mild-moderate muscle losses    Nutrition Diagnosis  Patient has Nutrition Diagnosis: Yes  Diagnosis Status (1): Resolved  Nutrition Diagnosis 1: Inadequate oral intake  Related to (1): altered mental status  As Evidenced by (1): consuming <50% of meals, need for oral nutritional supplements  Additional Nutrition Diagnosis: Diagnosis 2  Diagnosis Status (2): Ongoing  Nutrition Diagnosis 2: Altered nutrition related to laboratory values  Related to (2): endocrine dysfunction  As Evidenced by (2): blood glucose not consistently <180, need for carb controlled diet (blood glucose now mostly <180)       Nutrition Interventions/Recommendations         Nutrition Prescription:  Individualized Nutrition Prescription Provided for : 75g carb controlled diet. Oral nutritional supplements.        Nutrition Interventions:   Interventions: Meals and snacks, Medical food supplement  Goal: Consumes 3 meals per day  Medical Food Supplement: Commercial beverage  Goal: Glucerna TID (provides 220 kcal, 10 g protein per serving)    Nutrition Education:      Not appropriate    Nutrition Monitoring and Evaluation   Food/Nutrient Related History Monitoring  Monitoring and Evaluation Plan: Energy intake, Amount of food, Fluid intake  Energy Intake: Estimated energy intake  Criteria: Pt meets >75% of estimated energy needs  Fluid  Intake: Estimated fluid intake  Criteria: Meets >75% of estimated fluid needs  Amount of Food: Estimated amout of food, Medical food intake  Criteria: Pt consumes >75% of meals and supplements    Body Composition/Growth/Weight History  Monitoring and Evaluation Plan: Weight  Weight: Measured weight  Criteria: Maintains stable weight    Biochemical Data, Medical Tests and Procedures  Monitoring and Evaluation Plan: Glucose/endocrine profile, Electrolyte/renal panel  Electrolyte and Renal Panel: Sodium, Potassium, Phosphorus  Criteria: Electrolytes WNL  Glucose/Endocrine Profile: Glucose, casual  Criteria: BG within desirable range         Time Spent/Follow-up Reminder:   Time Spent (min): 30 minutes  Last Date of Nutrition Visit: 06/17/24  Nutrition Follow-Up Needed?: 5-7 days

## 2024-06-17 NOTE — PROGRESS NOTES
06/17/24 1553   Current Planned Discharge Disposition   Current Planned Discharge Disposition SNF  ([facility])     Facility notified they have received insurance auth, care team notified and pt medically cleared for discharge today. Facility confirms they are able to accommodate pt today. Transport requested via roundtrip and confirms 6pm pickup. Care team and facility notified. SW notified pt and pt son López, pt and son in agreement with discharge plan.

## 2024-06-17 NOTE — PROGRESS NOTES
Endocrinology Progress Note  Patient: Bing ZARAGOZA [facility]  Unit/Bed: 1105/1105-A  YOB: 1960  MRN: [id number]  Acct: [id number]   Admitting Diagnosis: Dehydration [E86.0]  Hypomagnesemia [E83.42]  Cellulitis of leg without foot [L03.119]  Acute kidney injury (CMS-HCC) [N17.9]  Open wound of lower extremity, unspecified laterality, initial encounter [S82.239J]  Anemia, unspecified type [D64.9]  Date:  6/4/2024  Hospital Day: 13  Attending: Vivek Orozco MD       Complaint:  Chief Complaint   Patient presents with    Labs Only     Pt came by quad from [place] for abnormal labs (Na 152). CKD st 4.          Assessment     Patient Active Problem List   Diagnosis    COVID-19    Lupus (Multi)    Ambulatory dysfunction    Myelodysplastic syndrome, unspecified (Multi)    Lupus vasculitis (Multi)    Hyperlipidemia    Pneumonia of both lower lobes due to infectious organism    Hallucinations    Leg swelling    Hyperglycemia    JED (acute kidney injury) (CMS-[facility])    Hypernatremia    Proliferative diabetic retinopathy of right eye without macular edema determined by examination associated with type 2 diabetes mellitus (Multi)    Urinary incontinence    Paraparesis (Multi)    Abdominal cramping    Abnormal echocardiogram    Abnormal gait    Abnormal thyroid blood test    Advanced COPD (Multi)    Afferent pupillary defect of right eye    Anemia    Pancytopenia (Multi)    Altitudinal scotoma of right eye    Arcuate scotoma of left eye    Arthropathy    Asymptomatic PVCs    PAF (paroxysmal atrial fibrillation) (Multi)    Balance problem    Bilateral high frequency sensorineural hearing loss    Bradycardia    Branch retinal artery occlusion    Cardiomyopathy (Multi)    Chronic diarrhea    Chronic fatigue    Chronic kidney disease (CKD), stage III (moderate) (Multi)    CKD stage G3a/A2, GFR 45-59 and albumin creatinine ratio  mg/g (Multi)    Combined forms of age-related cataract of left eye    Combined  forms of age-related cataract of right eye    Contracture of hand    Snoring    CVA (cerebral vascular accident) (Multi)    Daytime somnolence    Degeneration of lumbar/lumbosacral disc without myelopathy    Depression, major    Dizziness    Dupuytren's contracture    Eczema    Elevated alkaline phosphatase level    Encephalopathy acute    Facial twitching    Fibromyalgia    Fungal nail infection    GERD (gastroesophageal reflux disease)    Gouty arthropathy    H/O iritis    H/O orthostatic hypotension    Hereditary elliptocytosis (CMS-HCC)    High level of cardiac marker    Hip hematoma, right    Hypothermia    Insomnia    Leg edema, left    Loss of consciousness (Multi)    Low blood sugar reading    Lung nodule, multiple    Lupus nephritis (Multi)    Metabolic encephalopathy    Muscle cramps    Nodule of skin of left lower leg    Obstructive lung disease (Multi)    Osteoarthritis of left hand    Osteoarthritis of right hand    Osteopenia    Osteoporosis    Palpitations    Peripheral visual field defect    Persistent proteinuria    Positive colorectal cancer screening using Cologuard test    Benign essential HTN    Psychosis (Multi)    Pulmonary hypertension (Multi)    Refractive error    Hypertensive retinopathy    Retinal neovascularization    Secondary pulmonary arterial hypertension (Multi)    Shortness of breath on exertion    Spinal stenosis of lumbar region with neurogenic claudication    Subjective tinnitus of both ears    Swelling of right foot    Syncope and collapse    Thrombophlebitis of superficial veins of left lower extremity    Tinnitus of left ear    Vitamin D deficiency    DM type 2 with diabetic peripheral neuropathy (Multi)    Systemic lupus erythematosus (Multi)    Diabetic nephropathy (Multi)    Functional diarrhea    UTI (urinary tract infection)    Moderate protein-calorie malnutrition (Multi)    Shock, unspecified (Multi)    Hyperkalemia    Adrenal insufficiency (Multi)    Seizure-like  activity (Multi)    Meningitis ([facility])    Encephalopathy    Seizure (Multi)    Uncontrolled blood glucose    Cervical spondylosis with myelopathy    COPD (chronic obstructive pulmonary disease) (Multi)    Encounter for diabetes education    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)    Chronic kidney disease, stage 4 (severe) (Multi)    Ulcerative (chronic) pancolitis with rectal bleeding (Multi)    Anxiety    Cortical cataract    Hyperparathyroidism due to renal insufficiency (Multi)    Iron deficiency anemia due to chronic blood loss    Left ventricular hypertrophy    Nephrosclerosis    Obesity    Ocular migraine    Persistent cough    Proliferative diabetic retinopathy (Multi)    Raynaud's disease    Disorientation    Bradycardia, unspecified    Cellulitis of leg without foot          Plan:  Adenal insufficiency-  Continue hydrocortisone oral twice a day  Diabetes improved with mild hypoglycemia the morning on Lantus 10 units daily to stop continue sliding scale        SUBJECTIVE    Doing well but woke up eating breakfast this morning  Labs reviewed  Glucoses reviewed        VITALS      Vitals:    06/16/24 1512 06/16/24 2008 06/16/24 2315 06/17/24 0533   BP: 126/77 122/71 119/61 132/81   BP Location:  Right arm  Right arm   Patient Position:  Lying  Lying   Pulse: 62 70  60   Resp: 12 14  13   Temp: 35.6 °C (96.1 °F) 35.6 °C (96.1 °F) 36.3 °C (97.3 °F) 35.7 °C (96.3 °F)   TempSrc:  Temporal  Temporal   SpO2: 95% 95% 96% 95%   Weight:       Height:           Intake/Output Summary (Last 24 hours) at 6/17/2024 1126  Last data filed at 6/17/2024 0911  Gross per 24 hour   Intake 1578.34 ml   Output 400 ml   Net 1178.34 ml      Wt Readings from Last 4 Encounters:   06/04/24 95.3 kg (210 lb)   05/13/24 102 kg (224 lb 13.9 oz)   04/24/24 90.9 kg (200 lb 6.4 oz)   03/19/24 96.5 kg (212 lb 11.9 oz)        Allergies:  Allergies   Allergen Reactions    Ace Inhibitors Swelling,  "Angioedema, Shortness of breath and Other     'FACIAL TWISTING\" \"LOOKS LIKE I HAD A STROKE IN MY SLEEP\"    Hydroxychloroquine Other, Nausea And Vomiting, Nausea/vomiting and Unknown     RETINAL BLEEDING    RETINAL BLEEDING      RETINAL BLEEDING, Eye problems    Lisinopril Swelling    Penicillins Unknown     tolerates cephalosporins-unknown childhood allergy    Sulfa (Sulfonamide Antibiotics) Hives        PHYSICAL EXAM   Physical Exam  Awake alert sitting in the chair conversing appropriately      LABS   Magnesium:  Results from last 7 days   Lab Units 06/17/24  0548 06/15/24  0623 06/14/24  0626   MAGNESIUM mg/dL 1.66 1.62 1.76     Lipid Panel:       Lab Review  Lab Results   Component Value Date    BILITOT 0.4 06/17/2024    CALCIUM 8.6 06/17/2024    CO2 27 06/17/2024     (H) 06/17/2024    CREATININE 1.59 (H) 06/17/2024    GLUCOSE 64 (L) 06/17/2024    ALKPHOS 166 (H) 06/17/2024    K 4.0 06/17/2024    PROT 5.3 (L) 06/17/2024     (H) 06/17/2024    AST 18 06/17/2024    ALT 14 06/17/2024    BUN 37 (H) 06/17/2024    ANIONGAP 9 (L) 06/17/2024    MG 1.66 06/17/2024    PHOS 4.7 06/07/2024    GGT 63 (H) 01/07/2021     04/25/2024    ALBUMIN 2.8 (L) 06/17/2024    LIPASE 30 01/24/2023    GFRF 30 (A) 09/01/2023    GFRMALE CANCELED 04/05/2023     Lab Results   Component Value Date    TRIG 79 03/24/2023    CHOL 160 03/24/2023    HDL 70.9 03/24/2023     Lab Results   Component Value Date    HGBA1C 6.8 (H) 06/04/2024    HGBA1C 7.0 (H) 05/31/2024    HGBA1C 8.4 (H) 04/24/2024     The ASCVD Risk score (Stone ANDRADE, et al., 2019) failed to calculate for the following reasons:    The patient has a prior MI or stroke diagnosis   Lab Results   Component Value Date    HGBA1C 6.8 (H) 06/04/2024    HGBA1C 7.0 (H) 05/31/2024    HGBA1C 8.4 (H) 04/24/2024     (H) 06/17/2024    K 4.0 06/17/2024     (H) 06/17/2024    CO2 27 06/17/2024    BUN 37 (H) 06/17/2024    CREATININE 1.59 (H) 06/17/2024    CALCIUM 8.6 06/17/2024 "    ALBUMIN 2.8 (L) 06/17/2024    PROT 5.3 (L) 06/17/2024    BILITOT 0.4 06/17/2024    ALKPHOS 166 (H) 06/17/2024    ALT 14 06/17/2024    AST 18 06/17/2024    GLUCOSE 64 (L) 06/17/2024    CHOL 160 03/24/2023    TRIG 79 03/24/2023    HDL 70.9 03/24/2023    BHYDRXBUT 0.07 10/23/2023    CPEPTIDE 2.9 04/25/2024    INSAB <0.4 11/21/2023        apixaban, 2.5 mg, oral, BID  atorvastatin, 40 mg, oral, Nightly  epoetin laura or biosimilar, 8,000 Units, subcutaneous, Weekly  fludrocortisone, 0.1 mg, oral, Every other day  tiotropium, 2 puff, inhalation, Daily   And  fluticasone furoate-vilanteroL, 1 puff, inhalation, Daily  folic acid, 1 mg, oral, Daily  hydrocortisone, 20 mg, oral, q12h GERALD  insulin lispro, 0-10 Units, subcutaneous, TID  levETIRAcetam, 500 mg, oral, BID  melatonin, 5 mg, oral, Nightly  metoprolol tartrate, 25 mg, oral, BID  mycophenolate, 1,000 mg, oral, BID  paliperidone, 6 mg, oral, Daily  pantoprazole, 40 mg, oral, Daily  thiamine, 100 mg, oral, Daily             Electronically signed by Mela Banuelos MD on 6/17/2024 at 11:26 AM

## 2024-06-21 ENCOUNTER — NURSING HOME VISIT (OUTPATIENT)
Dept: POST ACUTE CARE | Facility: EXTERNAL LOCATION | Age: 64
End: 2024-06-21
Payer: MEDICARE

## 2024-06-21 VITALS
DIASTOLIC BLOOD PRESSURE: 87 MMHG | OXYGEN SATURATION: 97 % | HEART RATE: 60 BPM | RESPIRATION RATE: 18 BRPM | TEMPERATURE: 97.5 F | SYSTOLIC BLOOD PRESSURE: 134 MMHG | BODY MASS INDEX: 30.28 KG/M2 | WEIGHT: 211 LBS

## 2024-06-21 DIAGNOSIS — G93.40 ENCEPHALOPATHY: ICD-10-CM

## 2024-06-21 DIAGNOSIS — M32.9 SYSTEMIC LUPUS ERYTHEMATOSUS, UNSPECIFIED SLE TYPE, UNSPECIFIED ORGAN INVOLVEMENT STATUS (MULTI): Primary | Chronic | ICD-10-CM

## 2024-06-21 DIAGNOSIS — I77.89 LUPUS VASCULITIS (MULTI): Chronic | ICD-10-CM

## 2024-06-21 DIAGNOSIS — M32.19 LUPUS VASCULITIS (MULTI): Chronic | ICD-10-CM

## 2024-06-21 DIAGNOSIS — I42.9 CARDIOMYOPATHY, UNSPECIFIED TYPE (MULTI): ICD-10-CM

## 2024-06-21 DIAGNOSIS — I27.21 SECONDARY PULMONARY ARTERIAL HYPERTENSION (MULTI): ICD-10-CM

## 2024-06-21 DIAGNOSIS — R53.82 CHRONIC FATIGUE: ICD-10-CM

## 2024-06-21 DIAGNOSIS — M05.741 RHEUMATOID ARTHRITIS INVOLVING BOTH HANDS WITH POSITIVE RHEUMATOID FACTOR (MULTI): ICD-10-CM

## 2024-06-21 DIAGNOSIS — I51.7 LEFT VENTRICULAR HYPERTROPHY: ICD-10-CM

## 2024-06-21 DIAGNOSIS — M32.14 LUPUS NEPHRITIS (MULTI): ICD-10-CM

## 2024-06-21 DIAGNOSIS — M48.062 SPINAL STENOSIS OF LUMBAR REGION WITH NEUROGENIC CLAUDICATION: ICD-10-CM

## 2024-06-21 DIAGNOSIS — I73.00 RAYNAUD'S DISEASE WITHOUT GANGRENE: ICD-10-CM

## 2024-06-21 DIAGNOSIS — R56.9 SEIZURE (MULTI): ICD-10-CM

## 2024-06-21 DIAGNOSIS — M79.89 LEG SWELLING: ICD-10-CM

## 2024-06-21 DIAGNOSIS — M05.742 RHEUMATOID ARTHRITIS INVOLVING BOTH HANDS WITH POSITIVE RHEUMATOID FACTOR (MULTI): ICD-10-CM

## 2024-06-21 PROCEDURE — 99306 1ST NF CARE HIGH MDM 50: CPT | Performed by: INTERNAL MEDICINE

## 2024-06-21 NOTE — LETTER
Patient: Bing Holliday  : 1960    Encounter Date: 2024    Subjective  Patient ID: Bing Holliday is a 63 y.o. female who is acute skilled care being seen and evaluated for multiple medical problems.    HPI   This is 63-year-old female patient with an extraordinarily long and complicated past medical history including systemic lupus erythematosus with multiple system organ damage, diabetes mellitus, chronic kidney disease, questionable lupus nephritis, COPD, depression anxiety, metabolic encephalopathy, preleukemia, adrenal insufficiency, Raynaud's phenomenon, iron deficiency anemia, rheumatoid arthritis, paroxysmal atrial fibrillation, gout, fibromyalgia, cardiomyopathy, pulmonary hypertension.  The patient was transferred from another facility to the hospital for bilateral lower extremity edema and lymphedema along with mental status changes.  It was postulated that the mental status change was secondary to lupus which has occurred in the past.  Medications were changed including psychiatric medications.  She is then transferred here for ongoing rehabilitation and long-term care presumptively.  The patient is supposed to have follow-ups with nephrology, rheumatology, endocrinology, cardiology.    Current medications:  Paliperidone which is new  Tylenol  Albuterol  Apixaban  Lipitor  Florinef  Folic acid  Lasix  Solu-Cortef  Insulin  Keppra  Levaquin for lower extremity cellulitis  Meclizine  Melatonin  Metoprolol  Mucinex  CellCept  Pantoprazole  Potassium  Thiamine  Trelegy    Medications that were stopped:  Atovaquone  Magnesium oxide  Olanzapine    Review of Systems   Constitutional:  Positive for activity change, appetite change and fatigue. Negative for chills, diaphoresis and fever.   Respiratory:  Negative for cough and shortness of breath.    Cardiovascular:  Negative for chest pain and leg swelling.   Gastrointestinal:  Negative for constipation, diarrhea, nausea and vomiting.    Musculoskeletal:  Negative for joint swelling and myalgias.   Skin:  Positive for wound.   Psychiatric/Behavioral:  Positive for confusion.        Objective  /87   Pulse 60   Temp 36.4 °C (97.5 °F)   Resp 18   Wt 95.7 kg (211 lb)   LMP  (LMP Unknown)   SpO2 97%   BMI 30.28 kg/m²     Physical Exam  Vitals reviewed.   Constitutional:       General: She is not in acute distress.     Appearance: She is ill-appearing. She is not toxic-appearing.   Cardiovascular:      Rate and Rhythm: Normal rate and regular rhythm.      Pulses: Normal pulses.      Heart sounds:      No gallop.   Pulmonary:      Breath sounds: Normal breath sounds. No wheezing, rhonchi or rales.   Abdominal:      General: Abdomen is flat. Bowel sounds are normal.      Palpations: Abdomen is soft.      Tenderness: There is no guarding or rebound.   Musculoskeletal:      Right lower leg: Edema present.      Left lower leg: Edema present.   Skin:     Comments: Patient has a lower extremity venous stasis ulcer currently covered and dry   Neurological:      Motor: Weakness present.      Coordination: Coordination abnormal.      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  We will continue with rehabilitative restorative supportive and  wound care as the patient tolerates    B.  Taken in aggregate it appears to me that this patient has not only a severe case of systemic lupus erythematosus with associated lupus nephritis, lupus vasculitis, lupus cerebritis, and lupus carditis, but also overlapping rheumatologic problems including Raynaud's disease and rheumatoid arthritis.  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  For this reason the patient is being treated with an extremely aggressive regimen of immune modulation in the form of CellCept also known as mycophenolate mofetil.  This medication needs to be managed with extreme care and the patient must have close outpatient follow-up with her rheumatologist for this medication.  She will be considered to be immune compromised on the order of a transplant patient receiving antirejection drugs.    C.  The patient's disposition will be determined pending response to rehabilitation overall assessment of patient safety awareness however it would seem that at this point the patient will require long-term care.    D.  The patient's prognosis is [id number].        Electronically Signed By: Jose Mcginnis MD   6/27/24  7:03 PM

## 2024-06-21 NOTE — PROGRESS NOTES
Subjective   Patient ID: iBng Holliday is a 63 y.o. female who is acute skilled care being seen and evaluated for multiple medical problems.    HPI   This is 63-year-old female patient with an extraordinarily long and complicated past medical history including systemic lupus erythematosus with multiple system organ damage, diabetes mellitus, chronic kidney disease, questionable lupus nephritis, COPD, depression anxiety, metabolic encephalopathy, preleukemia, adrenal insufficiency, Raynaud's phenomenon, iron deficiency anemia, rheumatoid arthritis, paroxysmal atrial fibrillation, gout, fibromyalgia, cardiomyopathy, pulmonary hypertension.  The patient was transferred from another facility to the hospital for bilateral lower extremity edema and lymphedema along with mental status changes.  It was postulated that the mental status change was secondary to lupus which has occurred in the past.  Medications were changed including psychiatric medications.  She is then transferred here for ongoing rehabilitation and long-term care presumptively.  The patient is supposed to have follow-ups with nephrology, rheumatology, endocrinology, cardiology.    Current medications:  Paliperidone which is new  Tylenol  Albuterol  Apixaban  Lipitor  Florinef  Folic acid  Lasix  Solu-Cortef  Insulin  Keppra  Levaquin for lower extremity cellulitis  Meclizine  Melatonin  Metoprolol  Mucinex  CellCept  Pantoprazole  Potassium  Thiamine  Trelegy    Medications that were stopped:  Atovaquone  Magnesium oxide  Olanzapine    Review of Systems   Constitutional:  Positive for activity change, appetite change and fatigue. Negative for chills, diaphoresis and fever.   Respiratory:  Negative for cough and shortness of breath.    Cardiovascular:  Negative for chest pain and leg swelling.   Gastrointestinal:  Negative for constipation, diarrhea, nausea and vomiting.   Musculoskeletal:  Negative for joint swelling and myalgias.   Skin:  Positive for  wound.   Psychiatric/Behavioral:  Positive for confusion.        Objective   /87   Pulse 60   Temp 36.4 °C (97.5 °F)   Resp 18   Wt 95.7 kg (211 lb)   LMP  (LMP Unknown)   SpO2 97%   BMI 30.28 kg/m²     Physical Exam  Vitals reviewed.   Constitutional:       General: She is not in acute distress.     Appearance: She is ill-appearing. She is not toxic-appearing.   Cardiovascular:      Rate and Rhythm: Normal rate and regular rhythm.      Pulses: Normal pulses.      Heart sounds:      No gallop.   Pulmonary:      Breath sounds: Normal breath sounds. No wheezing, rhonchi or rales.   Abdominal:      General: Abdomen is flat. Bowel sounds are normal.      Palpations: Abdomen is soft.      Tenderness: There is no guarding or rebound.   Musculoskeletal:      Right lower leg: Edema present.      Left lower leg: Edema present.   Skin:     Comments: Patient has a lower extremity venous stasis ulcer currently covered and dry   Neurological:      Motor: Weakness present.      Coordination: Coordination abnormal.      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  We will continue with rehabilitative restorative supportive and wound care as the patient tolerates    B.  Taken in aggregate it appears to me that  this patient has not only a severe case of systemic lupus erythematosus with associated lupus nephritis, lupus vasculitis, lupus cerebritis, and lupus carditis, but also overlapping rheumatologic problems including Raynaud's disease and rheumatoid arthritis.  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  For this reason the patient is being treated with an extremely aggressive regimen of immune modulation in the form of CellCept also known as mycophenolate mofetil.  This medication needs to be managed with extreme care and the patient must have close outpatient follow-up with her rheumatologist for this medication.  She will be considered to be immune compromised on the order of a transplant patient receiving antirejection drugs.    C.  The patient's disposition will be determined pending response to rehabilitation overall assessment of patient safety awareness however it would seem that at this point the patient will require long-term care.    D.  The patient's prognosis is [id number].

## 2024-06-26 ENCOUNTER — TELEPHONE (OUTPATIENT)
Dept: PHARMACY | Facility: HOSPITAL | Age: 64
End: 2024-06-26
Payer: MEDICARE

## 2024-06-26 ENCOUNTER — NURSING HOME VISIT (OUTPATIENT)
Dept: POST ACUTE CARE | Facility: EXTERNAL LOCATION | Age: 64
End: 2024-06-26
Payer: MEDICARE

## 2024-06-26 VITALS
BODY MASS INDEX: 30.28 KG/M2 | SYSTOLIC BLOOD PRESSURE: 113 MMHG | HEART RATE: 62 BPM | OXYGEN SATURATION: 99 % | TEMPERATURE: 97.5 F | WEIGHT: 211 LBS | RESPIRATION RATE: 18 BRPM | DIASTOLIC BLOOD PRESSURE: 86 MMHG

## 2024-06-26 DIAGNOSIS — G82.20 PARAPARESIS (MULTI): ICD-10-CM

## 2024-06-26 DIAGNOSIS — E11.42 DM TYPE 2 WITH DIABETIC PERIPHERAL NEUROPATHY (MULTI): Chronic | ICD-10-CM

## 2024-06-26 DIAGNOSIS — L03.119 CELLULITIS OF LEG WITHOUT FOOT: Primary | ICD-10-CM

## 2024-06-26 DIAGNOSIS — I48.0 PAF (PAROXYSMAL ATRIAL FIBRILLATION) (MULTI): ICD-10-CM

## 2024-06-26 DIAGNOSIS — R26.2 AMBULATORY DYSFUNCTION: ICD-10-CM

## 2024-06-26 DIAGNOSIS — E27.40 ADRENAL INSUFFICIENCY (MULTI): ICD-10-CM

## 2024-06-26 DIAGNOSIS — M24.549 CONTRACTURE OF HAND: ICD-10-CM

## 2024-06-26 DIAGNOSIS — M32.9 LUPUS (MULTI): Chronic | ICD-10-CM

## 2024-06-26 PROCEDURE — 99309 SBSQ NF CARE MODERATE MDM 30: CPT | Performed by: NURSE PRACTITIONER

## 2024-06-26 NOTE — TELEPHONE ENCOUNTER
Population Health: Outreach by Ambulatory Pharmacy Team    Payor: Rohit MANLEY  Reason: Adherence  Medication: Atorvastatin 40mg  Outcome: Patient Reached: Declines Discussion - resides in SNF    Alla Manuel, PharmD

## 2024-06-26 NOTE — LETTER
Patient: Bing Holliday  : 1960    Encounter Date: 2024    Subjective  Bing Holliday is a 63 y.o. female Here for weekly skilled visit.  HPI   Health problems reviewed.  Participating in therapy as able, very weak.  Eating and drinking fair per staff.  she denies any complaints of pain but appears very weak and debilitated. .  No concerns per staff.       Review of Systems   Constitutional:  Positive for activity change, appetite change and fatigue. Negative for chills, diaphoresis and fever.   Respiratory:  Negative for cough and shortness of breath.    Cardiovascular:  Negative for chest pain and leg swelling.   Gastrointestinal:  Negative for constipation, diarrhea, nausea and vomiting.   Musculoskeletal:  Negative for joint swelling and myalgias.   Skin:  Positive for wound.   Psychiatric/Behavioral:  Positive for confusion.        Objective  /86   Pulse 62   Temp 36.4 °C (97.5 °F)   Resp 18   Wt 95.7 kg (211 lb)   LMP  (LMP Unknown)   SpO2 99%   BMI 30.28 kg/m²     Physical Exam  Vitals reviewed.   Constitutional:       General: She is not in acute distress.     Appearance: She is ill-appearing. She is not toxic-appearing.   Cardiovascular:      Rate and Rhythm: Normal rate and regular rhythm.      Pulses: Normal pulses.      Heart sounds:      No gallop.   Pulmonary:      Breath sounds: Normal breath sounds. No wheezing, rhonchi or rales.   Abdominal:      General: Abdomen is flat. Bowel sounds are normal.      Palpations: Abdomen is soft.      Tenderness: There is no guarding or rebound.   Musculoskeletal:      Right lower leg: Edema present.      Left lower leg: Edema present.   Skin:     Comments: Patient has a lower extremity venous stasis ulcer currently covered and dry   Neurological:      Motor: Weakness present.      Coordination: Coordination abnormal.      Comments: The patient seems to be encephalopathic to some degree and mildly confused          Assessment/Plan  Problem List Items Addressed This Visit       Lupus (Multi) (Chronic)     Follow up with specialists as scheduled.          Ambulatory dysfunction     continue with therapy as able.           Paraparesis (Multi)     Requires assistance with ADL's.           PAF (paroxysmal atrial fibrillation) (Multi)     On apixaban and metoprolol.  Rate controlled.  continue with current medical management           Contracture of hand     Appears very weak and debilitated, requires assistance with adl's.         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   Continue to monitor and adjust meds based on clinical course.  Had an episode of low blood sugar yesterday, 52 but she has also had high 200 readings.  If she develops another episode of hypoglycemia may need to decrease basal insulin.          Adrenal insufficiency (Multi)     On fludorocortisone.   continue with current medical management           Cellulitis of leg without foot - Primary     She is completing course of levaquin for cellulitis.  Dressing to RLE clean dry and intact        labs/meds/orders reviewed  staff to monitor and notify for any changes.  continue with therapy as able.  She appears very weak and debiliated, ss for discharge planning.    She appears high risk for complication, staff to closely monitor for any changes.       Electronically Signed By: ASIM Bennett   6/28/24  4:46 PM

## 2024-06-27 ASSESSMENT — ENCOUNTER SYMPTOMS
VOMITING: 0
APPETITE CHANGE: 1
JOINT SWELLING: 0
WOUND: 1
SHORTNESS OF BREATH: 0
ACTIVITY CHANGE: 1
CONSTIPATION: 0
FATIGUE: 1
DIAPHORESIS: 0
NAUSEA: 0
COUGH: 0
FEVER: 0
DIARRHEA: 0
CHILLS: 0
CONFUSION: 1
MYALGIAS: 0

## 2024-06-28 ENCOUNTER — APPOINTMENT (OUTPATIENT)
Dept: RADIOLOGY | Facility: HOSPITAL | Age: 64
DRG: 871 | End: 2024-06-28
Payer: MEDICARE

## 2024-06-28 ENCOUNTER — HOSPITAL ENCOUNTER (INPATIENT)
Facility: HOSPITAL | Age: 64
End: 2024-06-28
Attending: STUDENT IN AN ORGANIZED HEALTH CARE EDUCATION/TRAINING PROGRAM | Admitting: INTERNAL MEDICINE
Payer: MEDICARE

## 2024-06-28 ENCOUNTER — APPOINTMENT (OUTPATIENT)
Dept: CARDIOLOGY | Facility: HOSPITAL | Age: 64
DRG: 871 | End: 2024-06-28
Payer: MEDICARE

## 2024-06-28 DIAGNOSIS — R41.82 ALTERED MENTAL STATUS, UNSPECIFIED ALTERED MENTAL STATUS TYPE: ICD-10-CM

## 2024-06-28 DIAGNOSIS — N17.9 ACUTE KIDNEY INJURY SUPERIMPOSED ON CKD (CMS-HCC): ICD-10-CM

## 2024-06-28 DIAGNOSIS — N18.9 ACUTE KIDNEY INJURY SUPERIMPOSED ON CKD (CMS-HCC): ICD-10-CM

## 2024-06-28 DIAGNOSIS — A41.9 SEPTIC SHOCK (MULTI): ICD-10-CM

## 2024-06-28 DIAGNOSIS — T68.XXXA HYPOTHERMIA, INITIAL ENCOUNTER: ICD-10-CM

## 2024-06-28 DIAGNOSIS — E27.40 ADRENAL INSUFFICIENCY (MULTI): ICD-10-CM

## 2024-06-28 DIAGNOSIS — D61.818 PANCYTOPENIA (MULTI): Primary | ICD-10-CM

## 2024-06-28 DIAGNOSIS — R65.21 SEPTIC SHOCK (MULTI): ICD-10-CM

## 2024-06-28 DIAGNOSIS — I48.0 PAF (PAROXYSMAL ATRIAL FIBRILLATION) (MULTI): ICD-10-CM

## 2024-06-28 LAB
ABO GROUP (TYPE) IN BLOOD: NORMAL
ACANTHOCYTES BLD QL SMEAR: ABNORMAL
ALBUMIN SERPL BCP-MCNC: 3.1 G/DL (ref 3.4–5)
ALP SERPL-CCNC: 141 U/L (ref 33–136)
ALT SERPL W P-5'-P-CCNC: 31 U/L (ref 7–45)
ANION GAP BLDV CALCULATED.4IONS-SCNC: 5 MMOL/L (ref 10–25)
ANION GAP SERPL CALC-SCNC: 14 MMOL/L (ref 10–20)
ANTIBODY SCREEN: NORMAL
APPEARANCE UR: CLEAR
AST SERPL W P-5'-P-CCNC: 36 U/L (ref 9–39)
ATRIAL RATE: 63 BPM
BASE EXCESS BLDV CALC-SCNC: 2.4 MMOL/L (ref -2–3)
BASOPHILS # BLD MANUAL: 0 X10*3/UL (ref 0–0.1)
BASOPHILS NFR BLD MANUAL: 0 %
BILIRUB SERPL-MCNC: 0.3 MG/DL (ref 0–1.2)
BILIRUB UR STRIP.AUTO-MCNC: NEGATIVE MG/DL
BODY TEMPERATURE: ABNORMAL
BUN SERPL-MCNC: 44 MG/DL (ref 6–23)
BURR CELLS BLD QL SMEAR: ABNORMAL
CA-I BLDV-SCNC: 1.2 MMOL/L (ref 1.1–1.33)
CALCIUM SERPL-MCNC: 8.4 MG/DL (ref 8.6–10.3)
CARDIAC TROPONIN I PNL SERPL HS: 14 NG/L (ref 0–13)
CARDIAC TROPONIN I PNL SERPL HS: 18 NG/L (ref 0–13)
CHLORIDE BLDV-SCNC: 117 MMOL/L (ref 98–107)
CHLORIDE SERPL-SCNC: 114 MMOL/L (ref 98–107)
CO2 SERPL-SCNC: 25 MMOL/L (ref 21–32)
COLOR UR: ABNORMAL
CREAT SERPL-MCNC: 2.03 MG/DL (ref 0.5–1.05)
EGFRCR SERPLBLD CKD-EPI 2021: 27 ML/MIN/1.73M*2
EOSINOPHIL # BLD MANUAL: 0 X10*3/UL (ref 0–0.7)
EOSINOPHIL NFR BLD MANUAL: 0 %
ERYTHROCYTE [DISTWIDTH] IN BLOOD BY AUTOMATED COUNT: 20.7 % (ref 11.5–14.5)
GLUCOSE BLD MANUAL STRIP-MCNC: 110 MG/DL (ref 74–99)
GLUCOSE BLD MANUAL STRIP-MCNC: 116 MG/DL (ref 74–99)
GLUCOSE BLD MANUAL STRIP-MCNC: 136 MG/DL (ref 74–99)
GLUCOSE BLDV-MCNC: 123 MG/DL (ref 74–99)
GLUCOSE SERPL-MCNC: 126 MG/DL (ref 74–99)
GLUCOSE UR STRIP.AUTO-MCNC: NORMAL MG/DL
HCO3 BLDV-SCNC: 28.6 MMOL/L (ref 22–26)
HCT VFR BLD AUTO: 25.3 % (ref 36–46)
HCT VFR BLD EST: 22 % (ref 36–46)
HGB BLD-MCNC: 7.5 G/DL (ref 12–16)
HGB BLDV-MCNC: 7.2 G/DL (ref 12–16)
HOLD SPECIMEN: NORMAL
IMM GRANULOCYTES # BLD AUTO: 0.03 X10*3/UL (ref 0–0.7)
IMM GRANULOCYTES NFR BLD AUTO: 0.8 % (ref 0–0.9)
INHALED O2 CONCENTRATION: 21 %
KETONES UR STRIP.AUTO-MCNC: NEGATIVE MG/DL
LACTATE BLDV-SCNC: 1.5 MMOL/L (ref 0.4–2)
LACTATE SERPL-SCNC: 1.2 MMOL/L (ref 0.4–2)
LEUKOCYTE ESTERASE UR QL STRIP.AUTO: ABNORMAL
LYMPHOCYTES # BLD MANUAL: 1 X10*3/UL (ref 1.2–4.8)
LYMPHOCYTES NFR BLD MANUAL: 25 %
MAGNESIUM SERPL-MCNC: 1.49 MG/DL (ref 1.6–2.4)
MCH RBC QN AUTO: 30.1 PG (ref 26–34)
MCHC RBC AUTO-ENTMCNC: 29.6 G/DL (ref 32–36)
MCV RBC AUTO: 102 FL (ref 80–100)
METAMYELOCYTES # BLD MANUAL: 0.04 X10*3/UL
METAMYELOCYTES NFR BLD MANUAL: 1 %
MONOCYTES # BLD MANUAL: 0.08 X10*3/UL (ref 0.1–1)
MONOCYTES NFR BLD MANUAL: 2 %
MRSA DNA SPEC QL NAA+PROBE: NOT DETECTED
MUCOUS THREADS #/AREA URNS AUTO: ABNORMAL /LPF
MYELOCYTES # BLD MANUAL: 0.04 X10*3/UL
MYELOCYTES NFR BLD MANUAL: 1 %
NEUTROPHILS # BLD MANUAL: 2.84 X10*3/UL (ref 1.2–7.7)
NEUTS BAND # BLD MANUAL: 0.12 X10*3/UL (ref 0–0.7)
NEUTS BAND NFR BLD MANUAL: 3 %
NEUTS SEG # BLD MANUAL: 2.72 X10*3/UL (ref 1.2–7)
NEUTS SEG NFR BLD MANUAL: 68 %
NITRITE UR QL STRIP.AUTO: NEGATIVE
NRBC BLD-RTO: 0.8 /100 WBCS (ref 0–0)
OXYHGB MFR BLDV: 45.4 % (ref 45–75)
P AXIS: 25 DEGREES
P OFFSET: 161 MS
P ONSET: 136 MS
PCO2 BLDV: 53 MM HG (ref 41–51)
PH BLDV: 7.34 PH (ref 7.33–7.43)
PH UR STRIP.AUTO: 5.5 [PH]
PLATELET # BLD AUTO: 57 X10*3/UL (ref 150–450)
PO2 BLDV: 34 MM HG (ref 35–45)
POTASSIUM BLDV-SCNC: 3.8 MMOL/L (ref 3.5–5.3)
POTASSIUM SERPL-SCNC: 4 MMOL/L (ref 3.5–5.3)
PR INTERVAL: 272 MS
PROT SERPL-MCNC: 6.1 G/DL (ref 6.4–8.2)
PROT UR STRIP.AUTO-MCNC: ABNORMAL MG/DL
Q ONSET: 214 MS
QRS COUNT: 10 BEATS
QRS DURATION: 100 MS
QT INTERVAL: 440 MS
QTC CALCULATION(BAZETT): 442 MS
QTC FREDERICIA: 442 MS
R AXIS: 26 DEGREES
RBC # BLD AUTO: 2.49 X10*6/UL (ref 4–5.2)
RBC # UR STRIP.AUTO: NEGATIVE /UL
RBC #/AREA URNS AUTO: ABNORMAL /HPF
RBC MORPH BLD: ABNORMAL
RH FACTOR (ANTIGEN D): NORMAL
SAO2 % BLDV: 46 % (ref 45–75)
SODIUM BLDV-SCNC: 147 MMOL/L (ref 136–145)
SODIUM SERPL-SCNC: 149 MMOL/L (ref 136–145)
SP GR UR STRIP.AUTO: 1.01
T AXIS: -16 DEGREES
T OFFSET: 434 MS
T4 FREE SERPL-MCNC: 1.03 NG/DL (ref 0.61–1.12)
TOTAL CELLS COUNTED BLD: 100
TSH SERPL-ACNC: 6.46 MIU/L (ref 0.44–3.98)
UROBILINOGEN UR STRIP.AUTO-MCNC: NORMAL MG/DL
VENTRICULAR RATE: 61 BPM
WBC # BLD AUTO: 4 X10*3/UL (ref 4.4–11.3)
WBC #/AREA URNS AUTO: ABNORMAL /HPF

## 2024-06-28 PROCEDURE — 83605 ASSAY OF LACTIC ACID: CPT | Performed by: STUDENT IN AN ORGANIZED HEALTH CARE EDUCATION/TRAINING PROGRAM

## 2024-06-28 PROCEDURE — 2020000001 HC ICU ROOM DAILY

## 2024-06-28 PROCEDURE — 84484 ASSAY OF TROPONIN QUANT: CPT | Performed by: STUDENT IN AN ORGANIZED HEALTH CARE EDUCATION/TRAINING PROGRAM

## 2024-06-28 PROCEDURE — 71250 CT THORAX DX C-: CPT | Performed by: STUDENT IN AN ORGANIZED HEALTH CARE EDUCATION/TRAINING PROGRAM

## 2024-06-28 PROCEDURE — 99285 EMERGENCY DEPT VISIT HI MDM: CPT | Mod: 25

## 2024-06-28 PROCEDURE — 36620 INSERTION CATHETER ARTERY: CPT

## 2024-06-28 PROCEDURE — 74176 CT ABD & PELVIS W/O CONTRAST: CPT | Performed by: STUDENT IN AN ORGANIZED HEALTH CARE EDUCATION/TRAINING PROGRAM

## 2024-06-28 PROCEDURE — 85007 BL SMEAR W/DIFF WBC COUNT: CPT | Performed by: STUDENT IN AN ORGANIZED HEALTH CARE EDUCATION/TRAINING PROGRAM

## 2024-06-28 PROCEDURE — 84132 ASSAY OF SERUM POTASSIUM: CPT | Performed by: STUDENT IN AN ORGANIZED HEALTH CARE EDUCATION/TRAINING PROGRAM

## 2024-06-28 PROCEDURE — 96375 TX/PRO/DX INJ NEW DRUG ADDON: CPT

## 2024-06-28 PROCEDURE — 87641 MR-STAPH DNA AMP PROBE: CPT

## 2024-06-28 PROCEDURE — 96367 TX/PROPH/DG ADDL SEQ IV INF: CPT

## 2024-06-28 PROCEDURE — 71045 X-RAY EXAM CHEST 1 VIEW: CPT

## 2024-06-28 PROCEDURE — 96361 HYDRATE IV INFUSION ADD-ON: CPT

## 2024-06-28 PROCEDURE — 99291 CRITICAL CARE FIRST HOUR: CPT

## 2024-06-28 PROCEDURE — 85027 COMPLETE CBC AUTOMATED: CPT | Performed by: STUDENT IN AN ORGANIZED HEALTH CARE EDUCATION/TRAINING PROGRAM

## 2024-06-28 PROCEDURE — 96365 THER/PROPH/DIAG IV INF INIT: CPT

## 2024-06-28 PROCEDURE — 84443 ASSAY THYROID STIM HORMONE: CPT

## 2024-06-28 PROCEDURE — 86901 BLOOD TYPING SEROLOGIC RH(D): CPT | Performed by: STUDENT IN AN ORGANIZED HEALTH CARE EDUCATION/TRAINING PROGRAM

## 2024-06-28 PROCEDURE — 74176 CT ABD & PELVIS W/O CONTRAST: CPT

## 2024-06-28 PROCEDURE — 84484 ASSAY OF TROPONIN QUANT: CPT | Mod: 91

## 2024-06-28 PROCEDURE — 99291 CRITICAL CARE FIRST HOUR: CPT | Mod: 25 | Performed by: STUDENT IN AN ORGANIZED HEALTH CARE EDUCATION/TRAINING PROGRAM

## 2024-06-28 PROCEDURE — 83735 ASSAY OF MAGNESIUM: CPT | Performed by: STUDENT IN AN ORGANIZED HEALTH CARE EDUCATION/TRAINING PROGRAM

## 2024-06-28 PROCEDURE — 02HV33Z INSERTION OF INFUSION DEVICE INTO SUPERIOR VENA CAVA, PERCUTANEOUS APPROACH: ICD-10-PCS

## 2024-06-28 PROCEDURE — 36556 INSERT NON-TUNNEL CV CATH: CPT

## 2024-06-28 PROCEDURE — 2500000005 HC RX 250 GENERAL PHARMACY W/O HCPCS

## 2024-06-28 PROCEDURE — 84439 ASSAY OF FREE THYROXINE: CPT

## 2024-06-28 PROCEDURE — P9612 CATHETERIZE FOR URINE SPEC: HCPCS

## 2024-06-28 PROCEDURE — 87040 BLOOD CULTURE FOR BACTERIA: CPT | Mod: STJLAB | Performed by: STUDENT IN AN ORGANIZED HEALTH CARE EDUCATION/TRAINING PROGRAM

## 2024-06-28 PROCEDURE — 70450 CT HEAD/BRAIN W/O DYE: CPT | Performed by: STUDENT IN AN ORGANIZED HEALTH CARE EDUCATION/TRAINING PROGRAM

## 2024-06-28 PROCEDURE — 80053 COMPREHEN METABOLIC PANEL: CPT | Performed by: STUDENT IN AN ORGANIZED HEALTH CARE EDUCATION/TRAINING PROGRAM

## 2024-06-28 PROCEDURE — 81001 URINALYSIS AUTO W/SCOPE: CPT | Performed by: STUDENT IN AN ORGANIZED HEALTH CARE EDUCATION/TRAINING PROGRAM

## 2024-06-28 PROCEDURE — 2500000004 HC RX 250 GENERAL PHARMACY W/ HCPCS (ALT 636 FOR OP/ED): Performed by: STUDENT IN AN ORGANIZED HEALTH CARE EDUCATION/TRAINING PROGRAM

## 2024-06-28 PROCEDURE — 2500000004 HC RX 250 GENERAL PHARMACY W/ HCPCS (ALT 636 FOR OP/ED)

## 2024-06-28 PROCEDURE — 93005 ELECTROCARDIOGRAM TRACING: CPT

## 2024-06-28 PROCEDURE — 82947 ASSAY GLUCOSE BLOOD QUANT: CPT | Mod: 91

## 2024-06-28 PROCEDURE — 81003 URINALYSIS AUTO W/O SCOPE: CPT | Performed by: STUDENT IN AN ORGANIZED HEALTH CARE EDUCATION/TRAINING PROGRAM

## 2024-06-28 PROCEDURE — 70450 CT HEAD/BRAIN W/O DYE: CPT

## 2024-06-28 PROCEDURE — 36415 COLL VENOUS BLD VENIPUNCTURE: CPT | Performed by: STUDENT IN AN ORGANIZED HEALTH CARE EDUCATION/TRAINING PROGRAM

## 2024-06-28 PROCEDURE — 87086 URINE CULTURE/COLONY COUNT: CPT | Mod: STJLAB | Performed by: STUDENT IN AN ORGANIZED HEALTH CARE EDUCATION/TRAINING PROGRAM

## 2024-06-28 PROCEDURE — 96368 THER/DIAG CONCURRENT INF: CPT

## 2024-06-28 PROCEDURE — 3E033XZ INTRODUCTION OF VASOPRESSOR INTO PERIPHERAL VEIN, PERCUTANEOUS APPROACH: ICD-10-PCS | Performed by: INTERNAL MEDICINE

## 2024-06-28 PROCEDURE — 71045 X-RAY EXAM CHEST 1 VIEW: CPT | Performed by: RADIOLOGY

## 2024-06-28 PROCEDURE — 37799 UNLISTED PX VASCULAR SURGERY: CPT

## 2024-06-28 RX ORDER — CEFEPIME 1 G/50ML
1 INJECTION, SOLUTION INTRAVENOUS EVERY 24 HOURS
Status: DISCONTINUED | OUTPATIENT
Start: 2024-06-28 | End: 2024-06-28

## 2024-06-28 RX ORDER — NOREPINEPHRINE BITARTRATE/D5W 8 MG/250ML
.01-.5 PLASTIC BAG, INJECTION (ML) INTRAVENOUS CONTINUOUS
Status: DISPENSED | OUTPATIENT
Start: 2024-06-28

## 2024-06-28 RX ORDER — SERTRALINE HYDROCHLORIDE 25 MG/1
25 TABLET, FILM COATED ORAL DAILY
Status: ON HOLD | COMMUNITY
Start: 2024-06-28

## 2024-06-28 RX ORDER — MAGNESIUM SULFATE HEPTAHYDRATE 40 MG/ML
2 INJECTION, SOLUTION INTRAVENOUS ONCE
Status: COMPLETED | OUTPATIENT
Start: 2024-06-28 | End: 2024-06-28

## 2024-06-28 RX ORDER — NOREPINEPHRINE BITARTRATE/D5W 8 MG/250ML
0-.2 PLASTIC BAG, INJECTION (ML) INTRAVENOUS CONTINUOUS
Status: DISCONTINUED | OUTPATIENT
Start: 2024-06-28 | End: 2024-06-28

## 2024-06-28 RX ORDER — CEFEPIME 1 G/50ML
2 INJECTION, SOLUTION INTRAVENOUS ONCE
Status: COMPLETED | OUTPATIENT
Start: 2024-06-28 | End: 2024-06-28

## 2024-06-28 RX ORDER — HEPARIN SODIUM 10000 [USP'U]/100ML
0-4500 INJECTION, SOLUTION INTRAVENOUS CONTINUOUS
Status: DISCONTINUED | OUTPATIENT
Start: 2024-06-28 | End: 2024-06-28

## 2024-06-28 RX ORDER — INSULIN LISPRO 100 [IU]/ML
0-5 INJECTION, SOLUTION INTRAVENOUS; SUBCUTANEOUS EVERY 4 HOURS
Status: DISCONTINUED | OUTPATIENT
Start: 2024-06-28 | End: 2024-06-28

## 2024-06-28 RX ORDER — POTASSIUM CHLORIDE 14.9 MG/ML
20 INJECTION INTRAVENOUS EVERY 6 HOURS PRN
Status: DISCONTINUED | OUTPATIENT
Start: 2024-06-28 | End: 2024-06-28

## 2024-06-28 RX ORDER — PANTOPRAZOLE SODIUM 40 MG/1
40 TABLET, DELAYED RELEASE ORAL
Status: DISCONTINUED | OUTPATIENT
Start: 2024-06-29 | End: 2024-06-28

## 2024-06-28 RX ORDER — DEXTROSE MONOHYDRATE 50 MG/ML
75 INJECTION, SOLUTION INTRAVENOUS CONTINUOUS
Status: ACTIVE | OUTPATIENT
Start: 2024-06-28 | End: 2024-06-29

## 2024-06-28 RX ORDER — INSULIN LISPRO 100 [IU]/ML
0-5 INJECTION, SOLUTION INTRAVENOUS; SUBCUTANEOUS EVERY 4 HOURS
Status: ACTIVE | OUTPATIENT
Start: 2024-06-28

## 2024-06-28 RX ORDER — POTASSIUM CHLORIDE 14.9 MG/ML
20 INJECTION INTRAVENOUS EVERY 6 HOURS PRN
Status: ACTIVE | OUTPATIENT
Start: 2024-06-28

## 2024-06-28 RX ORDER — VANCOMYCIN HYDROCHLORIDE 1 G/20ML
INJECTION, POWDER, LYOPHILIZED, FOR SOLUTION INTRAVENOUS DAILY PRN
Status: DISCONTINUED | OUTPATIENT
Start: 2024-06-28 | End: 2024-06-29

## 2024-06-28 RX ORDER — PANTOPRAZOLE SODIUM 40 MG/10ML
40 INJECTION, POWDER, LYOPHILIZED, FOR SOLUTION INTRAVENOUS
Status: DISCONTINUED | OUTPATIENT
Start: 2024-06-29 | End: 2024-06-30

## 2024-06-28 RX ORDER — LEVETIRACETAM 5 MG/ML
500 INJECTION INTRAVASCULAR EVERY 12 HOURS
Status: DISCONTINUED | OUTPATIENT
Start: 2024-06-28 | End: 2024-06-30

## 2024-06-28 RX ORDER — MAGNESIUM SULFATE HEPTAHYDRATE 40 MG/ML
4 INJECTION, SOLUTION INTRAVENOUS EVERY 6 HOURS PRN
Status: DISCONTINUED | OUTPATIENT
Start: 2024-06-28 | End: 2024-06-28

## 2024-06-28 RX ORDER — DEXTROSE 50 % IN WATER (D50W) INTRAVENOUS SYRINGE
25
Status: DISCONTINUED | OUTPATIENT
Start: 2024-06-28 | End: 2024-06-28

## 2024-06-28 RX ORDER — DEXTROSE 50 % IN WATER (D50W) INTRAVENOUS SYRINGE
12.5
Status: DISCONTINUED | OUTPATIENT
Start: 2024-06-28 | End: 2024-06-28

## 2024-06-28 RX ORDER — METRONIDAZOLE 500 MG/100ML
500 INJECTION, SOLUTION INTRAVENOUS EVERY 8 HOURS
Status: DISCONTINUED | OUTPATIENT
Start: 2024-06-28 | End: 2024-06-28

## 2024-06-28 RX ORDER — ESOMEPRAZOLE MAGNESIUM 40 MG/1
40 GRANULE, DELAYED RELEASE ORAL
Status: DISCONTINUED | OUTPATIENT
Start: 2024-06-29 | End: 2024-06-28

## 2024-06-28 RX ORDER — NOREPINEPHRINE BITARTRATE/D5W 8 MG/250ML
PLASTIC BAG, INJECTION (ML) INTRAVENOUS
Status: COMPLETED
Start: 2024-06-28 | End: 2024-06-28

## 2024-06-28 RX ORDER — HEPARIN SODIUM 5000 [USP'U]/ML
5000 INJECTION, SOLUTION INTRAVENOUS; SUBCUTANEOUS EVERY 12 HOURS
Status: DISPENSED | OUTPATIENT
Start: 2024-06-28

## 2024-06-28 RX ORDER — DEXTROSE 50 % IN WATER (D50W) INTRAVENOUS SYRINGE
25
Status: ACTIVE | OUTPATIENT
Start: 2024-06-28

## 2024-06-28 RX ORDER — MAGNESIUM SULFATE HEPTAHYDRATE 40 MG/ML
2 INJECTION, SOLUTION INTRAVENOUS EVERY 6 HOURS PRN
Status: ACTIVE | OUTPATIENT
Start: 2024-06-28

## 2024-06-28 RX ORDER — DEXTROSE 50 % IN WATER (D50W) INTRAVENOUS SYRINGE
12.5
Status: ACTIVE | OUTPATIENT
Start: 2024-06-28

## 2024-06-28 RX ORDER — METRONIDAZOLE 500 MG/100ML
500 INJECTION, SOLUTION INTRAVENOUS ONCE
Status: COMPLETED | OUTPATIENT
Start: 2024-06-28 | End: 2024-06-28

## 2024-06-28 RX ORDER — HEPARIN SODIUM 5000 [USP'U]/ML
80 INJECTION, SOLUTION INTRAVENOUS; SUBCUTANEOUS ONCE
Status: DISCONTINUED | OUTPATIENT
Start: 2024-06-28 | End: 2024-06-28

## 2024-06-28 RX ORDER — MAGNESIUM SULFATE HEPTAHYDRATE 40 MG/ML
4 INJECTION, SOLUTION INTRAVENOUS EVERY 6 HOURS PRN
Status: ACTIVE | OUTPATIENT
Start: 2024-06-28

## 2024-06-28 RX ORDER — PANTOPRAZOLE SODIUM 40 MG/10ML
40 INJECTION, POWDER, LYOPHILIZED, FOR SOLUTION INTRAVENOUS
Status: DISCONTINUED | OUTPATIENT
Start: 2024-06-29 | End: 2024-06-28

## 2024-06-28 RX ORDER — MAGNESIUM SULFATE HEPTAHYDRATE 40 MG/ML
2 INJECTION, SOLUTION INTRAVENOUS EVERY 6 HOURS PRN
Status: DISCONTINUED | OUTPATIENT
Start: 2024-06-28 | End: 2024-06-28

## 2024-06-28 RX ORDER — ATORVASTATIN CALCIUM 40 MG/1
40 TABLET, FILM COATED ORAL NIGHTLY
Status: DISPENSED | OUTPATIENT
Start: 2024-06-28

## 2024-06-28 RX ORDER — HEPARIN SODIUM 5000 [USP'U]/ML
3000-6000 INJECTION, SOLUTION INTRAVENOUS; SUBCUTANEOUS EVERY 4 HOURS PRN
Status: DISCONTINUED | OUTPATIENT
Start: 2024-06-28 | End: 2024-06-28

## 2024-06-28 RX ORDER — PALIPERIDONE 9 MG/1
9 TABLET, EXTENDED RELEASE ORAL EVERY MORNING
Status: ON HOLD | COMMUNITY
Start: 2024-06-29

## 2024-06-28 RX ORDER — CEFEPIME 1 G/50ML
2 INJECTION, SOLUTION INTRAVENOUS EVERY 12 HOURS
Status: DISCONTINUED | OUTPATIENT
Start: 2024-06-28 | End: 2024-06-28

## 2024-06-28 SDOH — HEALTH STABILITY: MENTAL HEALTH: HOW OFTEN DO YOU HAVE 6 OR MORE DRINKS ON ONE OCCASION?: PATIENT UNABLE TO ANSWER

## 2024-06-28 SDOH — HEALTH STABILITY: MENTAL HEALTH
STRESS IS WHEN SOMEONE FEELS TENSE, NERVOUS, ANXIOUS, OR CAN'T SLEEP AT NIGHT BECAUSE THEIR MIND IS TROUBLED. HOW STRESSED ARE YOU?: PATIENT UNABLE TO ANSWER

## 2024-06-28 SDOH — SOCIAL STABILITY: SOCIAL INSECURITY
WITHIN THE LAST YEAR, HAVE YOU BEEN HUMILIATED OR EMOTIONALLY ABUSED IN OTHER WAYS BY YOUR PARTNER OR EX-PARTNER?: PATIENT UNABLE TO ANSWER

## 2024-06-28 SDOH — SOCIAL STABILITY: SOCIAL INSECURITY: HAVE YOU HAD THOUGHTS OF HARMING ANYONE ELSE?: UNABLE TO ASSESS

## 2024-06-28 SDOH — SOCIAL STABILITY: SOCIAL NETWORK: ARE YOU MARRIED, WIDOWED, DIVORCED, SEPARATED, NEVER MARRIED, OR LIVING WITH A PARTNER?: PATIENT UNABLE TO ANSWER

## 2024-06-28 SDOH — ECONOMIC STABILITY: HOUSING INSECURITY: IN THE LAST 12 MONTHS, HOW MANY PLACES HAVE YOU LIVED?: 1

## 2024-06-28 SDOH — SOCIAL STABILITY: SOCIAL INSECURITY: DO YOU FEEL ANYONE HAS EXPLOITED OR TAKEN ADVANTAGE OF YOU FINANCIALLY OR OF YOUR PERSONAL PROPERTY?: UNABLE TO ASSESS

## 2024-06-28 SDOH — SOCIAL STABILITY: SOCIAL INSECURITY: DO YOU FEEL UNSAFE GOING BACK TO THE PLACE WHERE YOU ARE LIVING?: UNABLE TO ASSESS

## 2024-06-28 SDOH — SOCIAL STABILITY: SOCIAL INSECURITY: WITHIN THE LAST YEAR, HAVE YOU BEEN AFRAID OF YOUR PARTNER OR EX-PARTNER?: PATIENT UNABLE TO ANSWER

## 2024-06-28 SDOH — SOCIAL STABILITY: SOCIAL NETWORK: IN A TYPICAL WEEK, HOW MANY TIMES DO YOU TALK ON THE PHONE WITH FAMILY, FRIENDS, OR NEIGHBORS?: PATIENT UNABLE TO ANSWER

## 2024-06-28 SDOH — SOCIAL STABILITY: SOCIAL INSECURITY: ABUSE: ADULT

## 2024-06-28 SDOH — HEALTH STABILITY: MENTAL HEALTH
HOW OFTEN DO YOU NEED TO HAVE SOMEONE HELP YOU WHEN YOU READ INSTRUCTIONS, PAMPHLETS, OR OTHER WRITTEN MATERIAL FROM YOUR DOCTOR OR PHARMACY?: PATIENT UNABLE TO RESPOND

## 2024-06-28 SDOH — HEALTH STABILITY: MENTAL HEALTH: HOW OFTEN DO YOU HAVE A DRINK CONTAINING ALCOHOL?: PATIENT UNABLE TO ANSWER

## 2024-06-28 SDOH — SOCIAL STABILITY: SOCIAL INSECURITY: HAVE YOU HAD ANY THOUGHTS OF HARMING ANYONE ELSE?: UNABLE TO ASSESS

## 2024-06-28 SDOH — SOCIAL STABILITY: SOCIAL INSECURITY: ARE YOU OR HAVE YOU BEEN THREATENED OR ABUSED PHYSICALLY, EMOTIONALLY, OR SEXUALLY BY ANYONE?: UNABLE TO ASSESS

## 2024-06-28 SDOH — SOCIAL STABILITY: SOCIAL NETWORK: HOW OFTEN DO YOU GET TOGETHER WITH FRIENDS OR RELATIVES?: PATIENT UNABLE TO ANSWER

## 2024-06-28 SDOH — SOCIAL STABILITY: SOCIAL NETWORK: HOW OFTEN DO YOU ATTEND CHURCH OR RELIGIOUS SERVICES?: PATIENT UNABLE TO ANSWER

## 2024-06-28 SDOH — SOCIAL STABILITY: SOCIAL INSECURITY: DOES ANYONE TRY TO KEEP YOU FROM HAVING/CONTACTING OTHER FRIENDS OR DOING THINGS OUTSIDE YOUR HOME?: UNABLE TO ASSESS

## 2024-06-28 SDOH — SOCIAL STABILITY: SOCIAL NETWORK: HOW OFTEN DO YOU ATTENT MEETINGS OF THE CLUB OR ORGANIZATION YOU BELONG TO?: PATIENT UNABLE TO ANSWER

## 2024-06-28 SDOH — HEALTH STABILITY: MENTAL HEALTH: HOW MANY STANDARD DRINKS CONTAINING ALCOHOL DO YOU HAVE ON A TYPICAL DAY?: PATIENT UNABLE TO ANSWER

## 2024-06-28 SDOH — SOCIAL STABILITY: SOCIAL INSECURITY
WITHIN THE LAST YEAR, HAVE YOU BEEN KICKED, HIT, SLAPPED, OR OTHERWISE PHYSICALLY HURT BY YOUR PARTNER OR EX-PARTNER?: PATIENT UNABLE TO ANSWER

## 2024-06-28 SDOH — SOCIAL STABILITY: SOCIAL INSECURITY: WERE YOU ABLE TO COMPLETE ALL THE BEHAVIORAL HEALTH SCREENINGS?: NO

## 2024-06-28 SDOH — SOCIAL STABILITY: SOCIAL INSECURITY
WITHIN THE LAST YEAR, HAVE TO BEEN RAPED OR FORCED TO HAVE ANY KIND OF SEXUAL ACTIVITY BY YOUR PARTNER OR EX-PARTNER?: PATIENT UNABLE TO ANSWER

## 2024-06-28 SDOH — SOCIAL STABILITY: SOCIAL INSECURITY: ARE THERE ANY APPARENT SIGNS OF INJURIES/BEHAVIORS THAT COULD BE RELATED TO ABUSE/NEGLECT?: UNABLE TO ASSESS

## 2024-06-28 SDOH — HEALTH STABILITY: PHYSICAL HEALTH: ON AVERAGE, HOW MANY MINUTES DO YOU ENGAGE IN EXERCISE AT THIS LEVEL?: 0 MIN

## 2024-06-28 SDOH — SOCIAL STABILITY: SOCIAL NETWORK
DO YOU BELONG TO ANY CLUBS OR ORGANIZATIONS SUCH AS CHURCH GROUPS UNIONS, FRATERNAL OR ATHLETIC GROUPS, OR SCHOOL GROUPS?: PATIENT UNABLE TO ANSWER

## 2024-06-28 SDOH — SOCIAL STABILITY: SOCIAL INSECURITY: HAS ANYONE EVER THREATENED TO HURT YOUR FAMILY OR YOUR PETS?: UNABLE TO ASSESS

## 2024-06-28 SDOH — HEALTH STABILITY: PHYSICAL HEALTH: ON AVERAGE, HOW MANY DAYS PER WEEK DO YOU ENGAGE IN MODERATE TO STRENUOUS EXERCISE (LIKE A BRISK WALK)?: 0 DAYS

## 2024-06-28 ASSESSMENT — COGNITIVE AND FUNCTIONAL STATUS - GENERAL
TURNING FROM BACK TO SIDE WHILE IN FLAT BAD: TOTAL
TOILETING: TOTAL
WALKING IN HOSPITAL ROOM: TOTAL
TURNING FROM BACK TO SIDE WHILE IN FLAT BAD: TOTAL
MOVING TO AND FROM BED TO CHAIR: TOTAL
CLIMB 3 TO 5 STEPS WITH RAILING: TOTAL
MOVING FROM LYING ON BACK TO SITTING ON SIDE OF FLAT BED WITH BEDRAILS: TOTAL
PERSONAL GROOMING: TOTAL
TOILETING: TOTAL
PERSONAL GROOMING: TOTAL
DRESSING REGULAR UPPER BODY CLOTHING: TOTAL
STANDING UP FROM CHAIR USING ARMS: TOTAL
EATING MEALS: TOTAL
EATING MEALS: TOTAL
DRESSING REGULAR LOWER BODY CLOTHING: TOTAL
DRESSING REGULAR UPPER BODY CLOTHING: TOTAL
MOVING FROM LYING ON BACK TO SITTING ON SIDE OF FLAT BED WITH BEDRAILS: TOTAL
DRESSING REGULAR LOWER BODY CLOTHING: TOTAL
DAILY ACTIVITIY SCORE: 6
PERSONAL GROOMING: TOTAL
DRESSING REGULAR LOWER BODY CLOTHING: TOTAL
DAILY ACTIVITIY SCORE: 6
HELP NEEDED FOR BATHING: TOTAL
WALKING IN HOSPITAL ROOM: TOTAL
MOVING TO AND FROM BED TO CHAIR: TOTAL
MOBILITY SCORE: 6
STANDING UP FROM CHAIR USING ARMS: TOTAL
EATING MEALS: TOTAL
TURNING FROM BACK TO SIDE WHILE IN FLAT BAD: TOTAL
MOBILITY SCORE: 6
DRESSING REGULAR UPPER BODY CLOTHING: TOTAL
HELP NEEDED FOR BATHING: TOTAL
CLIMB 3 TO 5 STEPS WITH RAILING: TOTAL
CLIMB 3 TO 5 STEPS WITH RAILING: TOTAL
MOBILITY SCORE: 6
STANDING UP FROM CHAIR USING ARMS: TOTAL
DAILY ACTIVITIY SCORE: 6
MOVING FROM LYING ON BACK TO SITTING ON SIDE OF FLAT BED WITH BEDRAILS: TOTAL
MOVING TO AND FROM BED TO CHAIR: TOTAL
TOILETING: TOTAL
PATIENT BASELINE BEDBOUND: YES
WALKING IN HOSPITAL ROOM: TOTAL
HELP NEEDED FOR BATHING: TOTAL

## 2024-06-28 ASSESSMENT — LIFESTYLE VARIABLES
HOW MANY STANDARD DRINKS CONTAINING ALCOHOL DO YOU HAVE ON A TYPICAL DAY: PATIENT UNABLE TO ANSWER
EVER FELT BAD OR GUILTY ABOUT YOUR DRINKING: NO
HOW OFTEN DO YOU HAVE A DRINK CONTAINING ALCOHOL: PATIENT UNABLE TO ANSWER
SKIP TO QUESTIONS 9-10: 0
HAVE PEOPLE ANNOYED YOU BY CRITICIZING YOUR DRINKING: NO
AUDIT-C TOTAL SCORE: -1
HOW OFTEN DO YOU HAVE 6 OR MORE DRINKS ON ONE OCCASION: PATIENT UNABLE TO ANSWER
EVER HAD A DRINK FIRST THING IN THE MORNING TO STEADY YOUR NERVES TO GET RID OF A HANGOVER: NO
SKIP TO QUESTIONS 9-10: 0
AUDIT-C TOTAL SCORE: -1
AUDIT-C TOTAL SCORE: -1
HAVE YOU EVER FELT YOU SHOULD CUT DOWN ON YOUR DRINKING: NO
TOTAL SCORE: 0

## 2024-06-28 ASSESSMENT — ENCOUNTER SYMPTOMS
SHORTNESS OF BREATH: 0
FEVER: 0
DIAPHORESIS: 0
CONSTIPATION: 0
FATIGUE: 1
ACTIVITY CHANGE: 1
CONFUSION: 1
VOMITING: 0
MYALGIAS: 0
COUGH: 0
DIARRHEA: 0
WOUND: 1
CHILLS: 0
APPETITE CHANGE: 1
JOINT SWELLING: 0
NAUSEA: 0

## 2024-06-28 ASSESSMENT — PAIN SCALES - WONG BAKER
WONGBAKER_NUMERICALRESPONSE: NO HURT
WONGBAKER_NUMERICALRESPONSE: HURTS EVEN MORE

## 2024-06-28 ASSESSMENT — ACTIVITIES OF DAILY LIVING (ADL)
ASSISTIVE_DEVICE: EYEGLASSES
DRESSING YOURSELF: DEPENDENT
TOILETING: DEPENDENT
LACK_OF_TRANSPORTATION: PATIENT UNABLE TO ANSWER
FEEDING YOURSELF: UNABLE TO ASSESS
GROOMING: DEPENDENT
JUDGMENT_ADEQUATE_SAFELY_COMPLETE_DAILY_ACTIVITIES: UNABLE TO ASSESS
WALKS IN HOME: DEPENDENT
PATIENT'S MEMORY ADEQUATE TO SAFELY COMPLETE DAILY ACTIVITIES?: UNABLE TO ASSESS
HEARING - LEFT EAR: FUNCTIONAL
HEARING - RIGHT EAR: FUNCTIONAL
BATHING: DEPENDENT
ADEQUATE_TO_COMPLETE_ADL: UNABLE TO ASSESS

## 2024-06-28 ASSESSMENT — PAIN - FUNCTIONAL ASSESSMENT
PAIN_FUNCTIONAL_ASSESSMENT: CPOT (CRITICAL CARE PAIN OBSERVATION TOOL)
PAIN_FUNCTIONAL_ASSESSMENT: CPOT (CRITICAL CARE PAIN OBSERVATION TOOL)
PAIN_FUNCTIONAL_ASSESSMENT: WONG-BAKER FACES
PAIN_FUNCTIONAL_ASSESSMENT: CPOT (CRITICAL CARE PAIN OBSERVATION TOOL)

## 2024-06-28 NOTE — ASSESSMENT & PLAN NOTE
On lantus and ss coverage.   Continue to monitor and adjust meds based on clinical course.  Had an episode of low blood sugar yesterday, 52 but she has also had high 200 readings.  If she develops another episode of hypoglycemia may need to decrease basal insulin.

## 2024-06-28 NOTE — SIGNIFICANT EVENT
CODE STATUS DISCUSSION     CODE STATUS discussion occurred at bedside with patient's family, attending physician, and RN. Family discussed how patient endorsed that she did not want any life-saving measures including chest compressions, electrical shock, or intubation.  Discussed with her current level of illness that this would be appropriate considering poor prognosis.  Discussed that family is able to change CODE STATUS at any time if hospital course improves.    CODE STATUS changed to DNR/DNI.      Lauri Silverio D.O.

## 2024-06-28 NOTE — H&P
Critical Care Medicine History and Physical        Subjective   Patient is a 63 y.o. female admitted on 6/28/2024  9:42 AM  with chief complaint of AMS and abnormal labs admitted to ICU for Metabolic Encephalopathy 2/2 multifactorial sepsis w/ adrenal insufficieny.     HPI:  Patient is a 63 y.o. 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 Outpatient labs showed a creatinine of 2.22 with a baseline of 1.5.  Recently admitted to [facility] for altered mental status 2/2 adrenal insufficiency in the setting of lower extremity wound infection treated with Rocephin returned to nursing facility on 6/17 where it was reported that she was improving in terms of weakness and mental status standpoint; however quickly regressed and became weaker - more difficult to lift/assist out of bed, softer spoken voice, and more somnolent and confused.     In the ED: In the emergency department patient presented hypothermic with a temperature of 90.1, and hypotensive with a blood pressure of 77/49, however was saturating well on room air.  Labs significant for a creatinine of 2.03, magnesium 1.49, troponin 4, lactate 1.2, sodium 149, chloride 114, hemoglobin 7.5, platelets 57.  ABG was done and showed pH of 7.34 and CO2 of 53.  UA showed 6-10 WBC with mild leuk esterase.  CT head was negative, CT chest abdomen pelvis with contrast showed a possible pneumonia.  Blood cultures were taken as well as urine cultures.  Patient started on IV Vanco and cefepime.  Central line was placed and Levophed was started.  Admitted to ICU for septic shock versus adrenal insufficiency.    Past Medical History:    Diagnosis Date    Abnormal kidney function 04/04/2023    Acute headache 03/10/2024    Acute low back pain 10/30/2023    Acute upper respiratory infection, unspecified 03/04/2020    Acute URI    Acute upper respiratory infection, unspecified 09/30/2015    URTI (acute upper respiratory infection)    Arthritis     Atypical facial pain 03/10/2024    Body mass index (BMI) 23.0-23.9, adult 10/15/2021    BMI 23.0-23.9, adult    Body mass index (BMI) 33.0-33.9, adult 03/04/2020    BMI 33.0-33.9,adult    Cardiomegaly 08/27/2013    Left ventricular hypertrophy    Chest pain 06/10/2022    Comment on above: Added by Problem List Migration; 2013-3-12; Moved to Suppressed Nov 25 2013 9:16PM; Comment on above: Added by Problem List Migration; 2013-3-12; Moved to Suppressed Nov 25 2013 9:16PM;    Chronic kidney disease, stage 3 unspecified (Multi) 07/02/2013    Chronic kidney disease, stage III (moderate)    Confusional state 03/10/2024    Contact with and (suspected) exposure to covid-19 04/04/2023    Delirium 03/10/2024    Disease of pericardium, unspecified ([facility]) 07/02/2013    Pericardial disease    Encounter for follow-up examination after completed treatment for conditions other than malignant neoplasm 10/06/2022    Hospital discharge follow-up    Generalized contraction of visual field, right eye 01/29/2015    Generalized contraction of visual field of right eye    Hearing loss 03/10/2024    History of cataract 03/10/2024    History of thrombocytopenia 03/10/2024    Comment on above: Added by Problem List Migration; 2013-7-2;    Homonymous bilateral field defects, right side 04/29/2016    Homonymous bilateral field defects of right side    Hypertensive chronic kidney disease with stage 1 through stage 4 chronic kidney disease, or unspecified chronic kidney disease 07/02/2013    Nephrosclerosis    Laceration without foreign body, left foot, initial encounter 07/03/2018    Foot laceration, left, initial encounter     Localized edema 03/10/2024    Mass of skin 03/10/2024    Migraine with aura, not intractable, without status migrainosus 10/24/2022    Ocular migraine    Open wound 03/10/2024    Open wound of left foot 03/10/2024    Other conditions influencing health status 07/02/2013    Chronic Glomerulonephritis In Diseases Classified Elsewhere    Other conditions influencing health status 07/02/2013    Progressive Familial Myoclonic Epilepsy    Other conditions influencing health status 07/02/2013    Protein S Deficiency    Other conditions influencing health status 05/22/2015    Familial Combined Hyperlipidemia    Other conditions influencing health status 10/24/2022    IDDM (insulin dependent diabetes mellitus)    Other conditions influencing health status 03/14/2022    Diabetes mellitus, insulin dependent (IDDM), uncontrolled    Other long term (current) drug therapy 10/24/2022    Long-term use of Plaquenil    Overweight with body mass index (BMI) 25.0-29.9 03/10/2024    Pain of toe 03/10/2024    Personal history of COVID-19 04/04/2023    Personal history of diseases of the blood and blood-forming organs and certain disorders involving the immune mechanism 07/02/2013    History of thrombocytopenia    Personal history of diseases of the skin and subcutaneous tissue 08/11/2015    History of foot ulcer    Personal history of nephrotic syndrome 07/02/2013    History of nephrotic syndrome    Personal history of other diseases of the circulatory system 08/27/2013    History of sinus tachycardia    Personal history of other diseases of the nervous system and sense organs     History of cataract    Personal history of other diseases of the respiratory system     History of bronchitis    Personal history of other infectious and parasitic diseases 07/02/2013    History of hepatitis    Personal history of other specified conditions     History of shortness of breath    Personal history of other specified conditions 08/27/2013     History of edema    Posterior epistaxis 03/10/2024    Puckering of macula, right eye 10/24/2022    ERM OD (epiretinal membrane, right eye)    Raynaud's syndrome without gangrene 07/02/2013    Raynaud's disease    Sinusitis 03/10/2024    Systemic lupus erythematosus, unspecified (Multi) 07/24/2015    SLE (systemic lupus erythematosus)    Systemic lupus erythematosus, unspecified (Multi) 07/24/2015    SLE (systemic lupus erythematosus)    Systemic lupus erythematosus, unspecified (Multi) 07/24/2015    Systemic lupus    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) 07/02/2013    Type 2 diabetes with nephropathy    Type 2 diabetes mellitus with mild nonproliferative diabetic retinopathy without macular edema, left eye (Multi) 07/27/2015    Non-proliferative diabetic retinopathy, left eye    Type 2 diabetes mellitus with mild nonproliferative diabetic retinopathy without macular edema, unspecified eye (Multi) 07/24/2015    Mild non proliferative diabetic retinopathy    Type 2 diabetes mellitus with proliferative diabetic retinopathy without macular edema, right eye (Multi) 07/27/2015    Proliferative diabetic retinopathy of right eye    Type 2 diabetes mellitus with proliferative diabetic retinopathy without macular edema, unspecified eye (Multi) 07/24/2015    Diabetic proliferative retinopathy    Unspecified acute and subacute iridocyclitis 07/24/2015    Acute iritis, right eye    Unspecified open wound, left foot, sequela 07/03/2018    Wound, open, foot, left, sequela     Past Surgical History:   Procedure Laterality Date    ANKLE SURGERY  01/29/2015    Ankle Surgery    CARDIAC ELECTROPHYSIOLOGY PROCEDURE Left 5/17/2024    Procedure: PPM IMPLANT DUAL;  Surgeon: Antonio Rodriges MD;  Location: [facility];  Service: Electrophysiology;  Laterality: Left;  Pt. needs platelets and Prednisone prior to procedure    CHOLECYSTECTOMY  01/29/2015    Cholecystectomy    CT GUIDED PERCUTANEOUS BIOPSY BONE DEEP   5/4/2021    CT GUIDED PERCUTANEOUS BIOPSY BONE DEEP 5/4/2021 [facility] CLINICAL LEGACY    EYE SURGERY  03/06/2015    Eye Surgery    FOOT SURGERY  01/29/2015    Foot Surgery    MR HEAD ANGIO WO IV CONTRAST  7/26/2013    MR HEAD ANGIO WO IV CONTRAST 7/26/2013 [facility] CLINICAL LEGACY    MR HEAD ANGIO WO IV CONTRAST  9/17/2021    MR HEAD ANGIO WO IV CONTRAST 9/17/2021 [facility]    MR HEAD ANGIO WO IV CONTRAST  3/25/2023    MR HEAD ANGIO WO IV CONTRAST [facility]    MR NECK ANGIO WO IV CONTRAST  7/26/2013    MR NECK ANGIO WO IV CONTRAST 7/26/2013 [facility] CLINICAL LEGACY    MR NECK ANGIO WO IV CONTRAST  9/17/2021    MR NECK ANGIO WO IV CONTRAST 9/17/2021 [facility]    MR NECK ANGIO WO IV CONTRAST  3/25/2023    MR NECK ANGIO WO IV CONTRAST [facility]    OTHER SURGICAL HISTORY  01/29/2015    Creation Of Pericardial Window    OTHER SURGICAL HISTORY  01/29/2015    Quadricepsplasty    TOTAL HIP ARTHROPLASTY  01/29/2015    Hip Replacement     Medications Prior to Admission   Medication Sig Dispense Refill Last Dose    apixaban (Eliquis) 2.5 mg tablet Take 1 tablet (2.5 mg) by mouth 2 times a day. 60 tablet 1 6/28/2024    atorvastatin (Lipitor) 40 mg tablet Take 1 tablet (40 mg) by mouth once daily. (Patient taking differently: Take 1 tablet (40 mg) by mouth once daily at bedtime.) 90 tablet 3 6/27/2024    acetaminophen (Tylenol) 325 mg tablet Take 2 tablets (650 mg) by mouth every 4 hours if needed for mild pain (1 - 3), moderate pain (4 - 6) or fever (temp greater than 38.0 C).       albuterol 90 mcg/actuation inhaler Inhale 2 puffs every 6 hours if needed for shortness of breath or wheezing.       albuterol-budesonide (Airsupra) 90-80 mcg/actuation inhaler Inhale 2 puffs every 6 hours if needed (sob/wheezing).       balsam peru-castor oiL (Venelex) ointment Apply 1 Application topically 2 times a day. To buttocks, sacrum, and thighs       blood-glucose sensor (Dexcom G6 Sensor) device Use to check sugars 3 times daily 4  each 2     Dexcom G4 platinum  (Dexcom G6 ) misc Use as instructed 1 each 0     Dexcom G4 platinum transmitter (Dexcom G6 Transmitter) device Use as instructed 1 each 0     fludrocortisone (Florinef) 0.1 mg tablet Take 1 tablet (0.1 mg) by mouth every other day.       fluticasone-umeclidin-vilanter (TRELEGY-ELLIPTA) 200-62.5-25 mcg blister with device Inhale 1 puff once daily. 60 each 5     folic acid (Folvite) 1 mg tablet TAKE 1 TABLET BY MOUTH EVERY DAY 90 tablet 1     furosemide (Lasix) 40 mg tablet Take 1 tablet (40 mg) by mouth once daily.       glucagon HCL (Glucagon, HCl, Emergency Kit) 1 mg recon soln Inject 1 mg into the muscle if needed.       guaiFENesin (Mucinex) 600 mg 12 hr tablet Take 2 tablets (1,200 mg) by mouth 2 times a day. Do not crush, chew, or split.       hydrocortisone (Cortef) 20 mg tablet Take 1 tablet (20 mg) by mouth once daily.       hydrocortisone (Cortef) 20 mg tablet Take 1 tablet (20 mg) by mouth every 12 hours.       insulin glargine (Lantus U-100 Insulin) 100 unit/mL injection Inject 10 Units under the skin once daily in the morning. Take as directed per insulin instructions.       insulin lispro (HumaLOG) 100 unit/mL injection Inject under the skin 3 times a day as needed for high blood sugar (before meals). Take as directed per insulin Sliding Scale instructions.  0-150 = 0 units  151-200 = 2 units  201-250 = 4 units  251-300 = 6 units  301-350 = 8 units  351-400 = 10 units  >400 = give 10 units and contact MD       levETIRAcetam (Keppra) 500 mg tablet Take 1 tablet (500 mg) by mouth every 12 hours. 60 tablet 0     meclizine (Antivert) 25 mg tablet Take 1 tablet (25 mg) by mouth every 12 hours if needed for dizziness.       melatonin 5 mg tablet Take 1 tablet (5 mg) by mouth once daily at bedtime.       metoprolol tartrate (Lopressor) 25 mg tablet Take 1 tablet (25 mg) by mouth 2 times a day.       multivitamin with minerals tablet Take 1 tablet by mouth once  "daily.       mycophenolate (Cellcept) 500 mg tablet TAKE 2 TABLETS BY MOUTH TWICE A  tablet 0     paliperidone (Invega) 6 mg 24 hr tablet Take 1 tablet (6 mg) by mouth once daily. Do not crush, chew, or split.       [START ON 6/29/2024] paliperidone (Invega) 9 mg 24 hr tablet Take 1 tablet (9 mg) by mouth once daily in the morning. Do not crush, chew, or split. Do not fill before June 29, 2024.       pantoprazole (ProtoNix) 40 mg EC tablet TAKE 1 TABLET BY MOUTH EVERY DAY 90 tablet 1     pen needle, diabetic 31 gauge x 5/16\" needle Use to inject 1-4 times daily as directed. 100 each 11     potassium chloride CR 20 mEq ER tablet Take 1 tablet (20 mEq) by mouth once daily. Do not crush or chew.       sertraline (Zoloft) 25 mg tablet Take 1 tablet (25 mg) by mouth once daily.       thiamine 100 mg tablet Take 1 tablet (100 mg) by mouth once daily.        Ace inhibitors, Hydroxychloroquine, Lisinopril, Sulfa (sulfonamide antibiotics), and Penicillins  Social History     Tobacco Use    Smoking status: Never     Passive exposure: Never    Smokeless tobacco: Never   Vaping Use    Vaping status: Never Used   Substance Use Topics    Alcohol use: Not Currently     Comment: RARE    Drug use: Never     Family History   Problem Relation Name Age of Onset    Other (RENAL DISEASE) Mother          END STAGE    Other (CARDIAC DISORDER) Mother      Cataracts Mother      Stroke Mother      Diabetes Mother      Kidney disease Mother      Lupus Mother      Other (CARDIAC DISORDER) Father      COPD Father      Glaucoma Father      Hypertension Father      Sleep apnea Father      Other (CARDIAC DISORDER) Sister      Depression Sister      Kidney disease Sister      Sickle cell trait Sister      Sleep apnea Daughter         Scheduled Medications:   sodium chloride, 1,500 mL, intravenous, Once         Continuous Medications:         PRN Medications:       Review of Systems:  Review of Systems  BOLD is positive for:  Constitutional: " fatigue, weight loss, fevers, chills  EENT: hearing loss, sinus pressure, visual changes, blurry vision  Respiratory: dry cough, productive cough, SOB, wheezing  Cardiovascular: chest pain, edema, palpitations  Gastrointestinal: abdominal pain, nausea, vomiting, diarrhea  Genitourinary: dysuria, hematuria, urinary frequency  Musculoskeletal: back pain, join pain, joint swelling, neck pain  Neurological: dizziness, paraesthesia, weakness, headaches, seizures, tremors  Behavioral/Psych: depression, anxiety, hallucinations, SI, HI  Hematologic/lymphatic: anemia, bruising, bleeding, lymphedema  Endocrine: cold intolerance, heat intolerance, polydipsia, polyphagia  Allergic/Immunologic: itching, sneezing, rash, swelling   Objective   Vitals:  24hr Min/Max:  Temp  Min: 32.3 °C (90.1 °F)  Max: 34.9 °C (94.8 °F)  Pulse  Min: 58  Max: 78  BP  Min: 70/52  Max: 94/68  Resp  Min: 11  Max: 28  SpO2  Min: 80 %  Max: 100 %    Physical exam:    Physical Exam  Constitutional: A&Ox0, appears frail, cachectic, ill appearing  Head and Face: Atraumatic, normocephalic   Eyes: Normal external exam, EOMI  ENT: Normal external inspection of ears and nose. Oropharynx normal.  Cardiovascular: RRR, S1/S2, no murmurs, rubs, or gallops, radial pulses +2  Pulmonary: CTAB, no respiratory distress, no wheezing, rales or rhonchi, on RA  Abdomen: +BS, soft, non-tender, nondistended, no guarding rigidity or rebound tenderness, no masses noted  MSK: Pitting edema of legs b/l, No joint swelling, normal movements of all extremities.   Neuro: No focal deficits, normal motor function, normal sensation, follows all commands  Skin- lower extremity wounds b/l without erythema or drainage    Assessment /Plan    Patient is a 63 y.o. 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     Neuro: Metabolic Encephalopathy 2/2 adrenal insufficiency vs sepsis   -History: Multiple admissions for AMS found to be in adrenal insufficiency, lupus cerebritis   -Home meds: Zoloft, Keppra 500 mg twice daily, meclizine, paliperidone  -GCS: E 4 V 2 M 4  -Pain: None  -Sedation: None  -[facility] ICU  - CT head negative   -Continue Keppra 500mg IV BID    Cardiac: Septic Shock refractory to IVF 2/2 PNA vs UTI   - History: afib (eliquis), bradycardia 2/2 sinus node dysfunction s/p pacemaker (5/17/2024), CAD s/p CABG 2013, HFmREF (EF 40-45% 5/2024)  - Goals:  [MAP> 65, HR ]  - Home meds: Atorvastatin, metoprolol tartrate 25 mg twice daily  - Last echo: 40-45% 5/2024  - Pressors: Levophed  - R central internal jugular placed  - Can place r art line if needed, however will follow blood cuff readings    Pulmonary: No active concerns  -History: COPD (no PFTs)  -Home meds: Albuterol  Continuous pulse oximetry   O2 PRN to maintain SpO2 > 94%, wean as tolerated  -Imaging: CTA New right lower lobe nodular opacities, likely infectious/inflammatory.     Gastrointestinal: No active concerns  -History: GERD  -Home meds: Pantoprazole  - Diet: NPO  - Supplementation: None  - Prophylaxis:  protonix  - Bowel regimen: None  - Last BM:    - If unable to tolerate PO tomorrow, will place NG tube and start home medications + feeds    Renal: JED on CKD3, improving. Possibly pre-renal in setting of decreased PO intake. 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  -History: recurrent adrenal insufficiency (hydrocortisone), 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  -Home meds: Lantus 10 units in a.m., sliding scale, Cortef 20 mg in a.m./20 mg p.m., flu cortisone 0.1 mg every other day  - sliding scale: Holding with hx of refractory hypoglycemia and NPO  - Solu cortef 100mg q8  - Endocrinology consulted  - Hypoglycemia protocol  - Holding mycophenolate in setting of infection    Results from last 7 days   Lab Units 24  1405 24  0958 24  1554   POCT GLUCOSE mg/dL 116*  --   --    GLUCOSE mg/dL  --  126* 58*      -BG < 180 goal    Hematology: No active concerns  - Daily CBC, coags  -History: Pancytopenia   -Home meds: Eliquis 2.5 mg twice daily  Results from last 7 days   Lab Units 24  0958 24  1554   HEMOGLOBIN g/dL 7.5* 7.1*   HEMATOCRIT % 25.3* 23.6*   PLATELETS AUTO x10*3/uL 57* 63*     - DVT Prophylaxis: Heparin subcutaneous 5000U BID    Infectious Disease: Sepsis 2/2 PNA vs UTI  -History: Recently treated with rocephin for lower leg wound infection at [place]    Results from last 7 days   Lab Units 24  0958 24  1554   WBC AUTO x10*3/uL 4.0* 4.3*     Temp (24hrs), Av.4 °C (92.1 °F), Min:32.3 °C (90.1 °F), Max:34.9 °C (94.8 °F)     -Abx: Vancomycin (-), Cefepime (-)  -Cultures:    Blood Culture :   Urine Culture :    Musculoskeletal:   - PT to see  - OT to see      LDA:   CVC 24 Triple lumen Right Internal jugular (Active)   Placement Date/Time: 24 1300   Placed by External Staff?: (c) Other (Comment)  Hand Hygiene Performed Prior to CVC Insertion: Yes  Site Prep: Chlorhexidine   Site Prep Agent has Completely Dried Before Insertion: Yes  All 5 Sterile Barriers Used...   Number of days: 0        Urethral Catheter Temperature probe 16 Fr. (Active)   Placement Date/Time: 06/28/24 1148   Hand Hygiene Completed: Yes  Catheter Type: Temperature probe  Tube Size (Fr.): 16 Fr.  Catheter Balloon Size: 10 mL  Urine Returned: Yes   Number of days: 0       External Urinary Catheter Female (Active)   Placement Date/Time: 06/07/24 0005   Placed by: Jaswant WING  Hand Hygiene Completed: Yes  External Catheter Type: Female   Number of days: 21         CODE STATUS: DNR/DNI    ABCDEF Checklist  Analgesia: Spontaneous awakening trial to be pursued if clinically appropriate. RASS goal reviewed  Breathing: Spontaneous breathing trial to be pursued if clinically appropriate. Mechanical power of assisted ventilation reviewed  Choice of analgesia/sedation: Analgesic and sedative agents adjusted per clinical context.   Delirium assessed by CAM, will avoid exacerbating factors  Early mobility and exercise: Physical and occupational therapy engaged  Family: Plan of care, overall trajectory of patient shared with family. Questions elicited and answered as appropriate.       Due to the high probability of life threatening clinical decompensation, the patient required critical care time evaluating and managing this patient.  Critical care time included obtaining a history, examining the patient, ordering and reviewing studies, discussing, developing, and implementing a management plan, evaluating the patient's response to treatment, and discussion with other care team providers. I saw and evaluated the patient myself.  Critical care time was performed exclusive of billable procedures.    Critical care time: 90mins      Case discussed with attending , Dr. Tomas Silverio, DO  Internal Medicine, PGY-1

## 2024-06-28 NOTE — ED PROVIDER NOTES
EMERGENCY DEPARTMENT ENCOUNTER      Pt Name: Bing Holliday  MRN: [id number]  Birthdate 1960  Date of evaluation: 6/28/2024  Provider: Hector Felton MD    CHIEF COMPLAINT     No chief complaint on file.    HISTORY OF PRESENT ILLNESS    HPI  63-year-old female presents to our emergency department from [facility] with a chief complaint of abnormal labs.  Patient is noncommunicative but is apparently at her baseline.  Patient has what appears to be a new onset JED with a creatinine of 2.2 and EGFR from 25 this is up from her most recent laboratory drawn on June 17.  Nursing Notes were reviewed.    PAST MEDICAL HISTORY     Past Medical History:   Diagnosis Date    Abnormal kidney function 04/04/2023    Acute headache 03/10/2024    Acute low back pain 10/30/2023    Acute upper respiratory infection, unspecified 03/04/2020    Acute URI    Acute upper respiratory infection, unspecified 09/30/2015    URTI (acute upper respiratory infection)    Arthritis     Atypical facial pain 03/10/2024    Body mass index (BMI) 23.0-23.9, adult 10/15/2021    BMI 23.0-23.9, adult    Body mass index (BMI) 33.0-33.9, adult 03/04/2020    BMI 33.0-33.9,adult    Cardiomegaly 08/27/2013    Left ventricular hypertrophy    Chest pain 06/10/2022    Comment on above: Added by Problem List Migration; 2013-3-12; Moved to Suppressed Nov 25 2013 9:16PM; Comment on above: Added by Problem List Migration; 2013-3-12; Moved to Suppressed Nov 25 2013 9:16PM;    Chronic kidney disease, stage 3 unspecified (Multi) 07/02/2013    Chronic kidney disease, stage III (moderate)    Confusional state 03/10/2024    Contact with and (suspected) exposure to covid-19 04/04/2023    Delirium 03/10/2024    Disease of pericardium, unspecified ([facility]) 07/02/2013    Pericardial disease    Encounter for follow-up examination after completed treatment for conditions other than malignant neoplasm 10/06/2022    Hospital discharge follow-up    Generalized  contraction of visual field, right eye 01/29/2015    Generalized contraction of visual field of right eye    Hearing loss 03/10/2024    History of cataract 03/10/2024    History of thrombocytopenia 03/10/2024    Comment on above: Added by Problem List Migration; 2013-7-2;    Homonymous bilateral field defects, right side 04/29/2016    Homonymous bilateral field defects of right side    Hypertensive chronic kidney disease with stage 1 through stage 4 chronic kidney disease, or unspecified chronic kidney disease 07/02/2013    Nephrosclerosis    Laceration without foreign body, left foot, initial encounter 07/03/2018    Foot laceration, left, initial encounter    Localized edema 03/10/2024    Mass of skin 03/10/2024    Migraine with aura, not intractable, without status migrainosus 10/24/2022    Ocular migraine    Open wound 03/10/2024    Open wound of left foot 03/10/2024    Other conditions influencing health status 07/02/2013    Chronic Glomerulonephritis In Diseases Classified Elsewhere    Other conditions influencing health status 07/02/2013    Progressive Familial Myoclonic Epilepsy    Other conditions influencing health status 07/02/2013    Protein S Deficiency    Other conditions influencing health status 05/22/2015    Familial Combined Hyperlipidemia    Other conditions influencing health status 10/24/2022    IDDM (insulin dependent diabetes mellitus)    Other conditions influencing health status 03/14/2022    Diabetes mellitus, insulin dependent (IDDM), uncontrolled    Other long term (current) drug therapy 10/24/2022    Long-term use of Plaquenil    Overweight with body mass index (BMI) 25.0-29.9 03/10/2024    Pain of toe 03/10/2024    Personal history of COVID-19 04/04/2023    Personal history of diseases of the blood and blood-forming organs and certain disorders involving the immune mechanism 07/02/2013    History of thrombocytopenia    Personal history of diseases of the skin and subcutaneous tissue  08/11/2015    History of foot ulcer    Personal history of nephrotic syndrome 07/02/2013    History of nephrotic syndrome    Personal history of other diseases of the circulatory system 08/27/2013    History of sinus tachycardia    Personal history of other diseases of the nervous system and sense organs     History of cataract    Personal history of other diseases of the respiratory system     History of bronchitis    Personal history of other infectious and parasitic diseases 07/02/2013    History of hepatitis    Personal history of other specified conditions     History of shortness of breath    Personal history of other specified conditions 08/27/2013    History of edema    Posterior epistaxis 03/10/2024    Puckering of macula, right eye 10/24/2022    ERM OD (epiretinal membrane, right eye)    Raynaud's syndrome without gangrene 07/02/2013    Raynaud's disease    Sinusitis 03/10/2024    Systemic lupus erythematosus, unspecified (Multi) 07/24/2015    SLE (systemic lupus erythematosus)    Systemic lupus erythematosus, unspecified (Multi) 07/24/2015    SLE (systemic lupus erythematosus)    Systemic lupus erythematosus, unspecified (Multi) 07/24/2015    Systemic lupus    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) 07/02/2013    Type 2 diabetes with nephropathy    Type 2 diabetes mellitus with mild nonproliferative diabetic retinopathy without macular edema, left eye (Multi) 07/27/2015    Non-proliferative diabetic retinopathy, left eye    Type 2 diabetes mellitus with mild nonproliferative diabetic retinopathy without macular edema, unspecified eye (Multi) 07/24/2015    Mild non proliferative diabetic retinopathy    Type 2 diabetes mellitus with proliferative diabetic retinopathy without macular edema, right eye (Multi) 07/27/2015    Proliferative diabetic retinopathy of right eye    Type 2 diabetes mellitus with proliferative diabetic retinopathy without macular edema, unspecified eye (Multi)  07/24/2015    Diabetic proliferative retinopathy    Unspecified acute and subacute iridocyclitis 07/24/2015    Acute iritis, right eye    Unspecified open wound, left foot, sequela 07/03/2018    Wound, open, foot, left, sequela         SURGICAL HISTORY       Past Surgical History:   Procedure Laterality Date    ANKLE SURGERY  01/29/2015    Ankle Surgery    CARDIAC ELECTROPHYSIOLOGY PROCEDURE Left 5/17/2024    Procedure: PPM IMPLANT DUAL;  Surgeon: Antonio Rodriges MD;  Location: [facility];  Service: Electrophysiology;  Laterality: Left;  Pt. needs platelets and Prednisone prior to procedure    CHOLECYSTECTOMY  01/29/2015    Cholecystectomy    CT GUIDED PERCUTANEOUS BIOPSY BONE DEEP  5/4/2021    CT GUIDED PERCUTANEOUS BIOPSY BONE DEEP 5/4/2021 [facility] CLINICAL LEGACY    EYE SURGERY  03/06/2015    Eye Surgery    FOOT SURGERY  01/29/2015    Foot Surgery    MR HEAD ANGIO WO IV CONTRAST  7/26/2013    MR HEAD ANGIO WO IV CONTRAST 7/26/2013 [facility] CLINICAL LEGACY    MR HEAD ANGIO WO IV CONTRAST  9/17/2021    MR HEAD ANGIO WO IV CONTRAST 9/17/2021 [facility]GAC[facility]    MR HEAD ANGIO WO IV CONTRAST  3/25/2023    MR HEAD ANGIO WO IV CONTRAST [facility]    MR NECK ANGIO WO IV CONTRAST  7/26/2013    MR NECK ANGIO WO IV CONTRAST 7/26/2013 [facility] CLINICAL LEGACY    MR NECK ANGIO WO IV CONTRAST  9/17/2021    MR NECK ANGIO WO IV CONTRAST 9/17/2021 [facility]    MR NECK ANGIO WO IV CONTRAST  3/25/2023    MR NECK ANGIO WO IV CONTRAST [facility]    OTHER SURGICAL HISTORY  01/29/2015    Creation Of Pericardial Window    OTHER SURGICAL HISTORY  01/29/2015    Quadricepsplasty    TOTAL HIP ARTHROPLASTY  01/29/2015    Hip Replacement         CURRENT MEDICATIONS       Previous Medications    ACETAMINOPHEN (TYLENOL) 325 MG TABLET    Take 2 tablets (650 mg) by mouth every 4 hours if needed for mild pain (1 - 3), moderate pain (4 - 6) or fever (temp greater than 38.0 C).    ALBUTEROL 90 MCG/ACTUATION INHALER    Inhale 2 puffs every 6  hours if needed for shortness of breath or wheezing.    APIXABAN (ELIQUIS) 2.5 MG TABLET    Take 1 tablet (2.5 mg) by mouth 2 times a day.    ATORVASTATIN (LIPITOR) 40 MG TABLET    Take 1 tablet (40 mg) by mouth once daily.    BALSAM PERU-CASTOR OIL (VENELEX) OINTMENT    Apply topically 2 times a day. APPLY TO BUTTOCKS, SACRUM, AND THIGHS.    BLOOD-GLUCOSE SENSOR (DEXCOM G6 SENSOR) DEVICE    Use to check sugars 3 times daily    DEXCOM G4 PLATINUM  (DEXCOM G6 ) MISC    Use as instructed    DEXCOM G4 PLATINUM TRANSMITTER (DEXCOM G6 TRANSMITTER) DEVICE    Use as instructed    FLUDROCORTISONE (FLORINEF) 0.1 MG TABLET    Take 1 tablet (0.1 mg) by mouth every other day.    FLUTICASONE-UMECLIDIN-VILANTER (TRELEGY-ELLIPTA) 200-62.5-25 MCG BLISTER WITH DEVICE    Inhale 1 puff once daily.    FOLIC ACID (FOLVITE) 1 MG TABLET    TAKE 1 TABLET BY MOUTH EVERY DAY    FUROSEMIDE (LASIX) 20 MG TABLET    Take 2 tablets (40 mg) by mouth once daily.    GLUCAGON HCL (GLUCAGON, HCL, EMERGENCY KIT) 1 MG RECON SOLN    Inject 1 mg into the muscle if needed.    GUAIFENESIN (MUCINEX) 600 MG 12 HR TABLET    Take 2 tablets (1,200 mg) by mouth 2 times a day. Do not crush, chew, or split.    HYDROCORTISONE (CORTEF) 20 MG TABLET    Take 1 tablet (20 mg) by mouth once daily.    HYDROCORTISONE (CORTEF) 20 MG TABLET    Take 1 tablet (20 mg) by mouth every 12 hours.    INSULIN GLARGINE (LANTUS U-100 INSULIN) 100 UNIT/ML INJECTION    Inject 10 Units under the skin once daily in the morning. Take as directed per insulin instructions.    INSULIN LISPRO (HUMALOG) 100 UNIT/ML INJECTION    Inject under the skin 3 times a day as needed for high blood sugar (before meals). Take as directed per insulin Sliding Scale instructions.  0-150 = 0 units  151-200 = 2 units  201-250 = 4 units  251-300 = 6 units  301-350 = 8 units  351-400 = 10 units  >400 = give 10 units and contact MD    LEVETIRACETAM (KEPPRA) 500 MG TABLET    Take 1 tablet (500 mg) by  "mouth every 12 hours.    LEVOFLOXACIN (LEVAQUIN) 500 MG TABLET    Take 1 tablet (500 mg) by mouth once daily. X 10 days    MECLIZINE (ANTIVERT) 25 MG TABLET    Take 1 tablet (25 mg) by mouth every 12 hours if needed for dizziness.    MELATONIN 5 MG TABLET    Take 1 tablet (5 mg) by mouth once daily at bedtime.    METOPROLOL TARTRATE (LOPRESSOR) 25 MG TABLET    Take 1 tablet (25 mg) by mouth 2 times a day.    MULTIVITAMIN WITH MINERALS TABLET    Take 1 tablet by mouth once daily.    MYCOPHENOLATE (CELLCEPT) 500 MG TABLET    TAKE 2 TABLETS BY MOUTH TWICE A DAY    NUT.TX.GLUC INTOL,LF,SOY/FIBER (BOOST GLUCOSE CONTROL ORAL)    Take 8 fluid ounces by mouth once daily in the morning.    PALIPERIDONE (INVEGA) 6 MG 24 HR TABLET    Take 1 tablet (6 mg) by mouth once daily. Do not crush, chew, or split.    PANTOPRAZOLE (PROTONIX) 40 MG EC TABLET    TAKE 1 TABLET BY MOUTH EVERY DAY    PEN NEEDLE, DIABETIC 31 GAUGE X 5/16\" NEEDLE    Use to inject 1-4 times daily as directed.    POTASSIUM CHLORIDE CR 20 MEQ ER TABLET    Take 1 tablet (20 mEq) by mouth once daily. Do not crush or chew.    THIAMINE 100 MG TABLET    Take 1 tablet (100 mg) by mouth once daily.       ALLERGIES     Ace inhibitors, Hydroxychloroquine, Lisinopril, Penicillins, and Sulfa (sulfonamide antibiotics)    FAMILY HISTORY       Family History   Problem Relation Name Age of Onset    Other (RENAL DISEASE) Mother          END STAGE    Other (CARDIAC DISORDER) Mother      Cataracts Mother      Stroke Mother      Diabetes Mother      Kidney disease Mother      Lupus Mother      Other (CARDIAC DISORDER) Father      COPD Father      Glaucoma Father      Hypertension Father      Sleep apnea Father      Other (CARDIAC DISORDER) Sister      Depression Sister      Kidney disease Sister      Sickle cell trait Sister      Sleep apnea Daughter            SOCIAL HISTORY       Social History     Socioeconomic History    Marital status:      Spouse name: Not on file    " Number of children: Not on file    Years of education: Not on file    Highest education level: Not on file   Occupational History    Not on file   Tobacco Use    Smoking status: Never     Passive exposure: Never    Smokeless tobacco: Never   Vaping Use    Vaping status: Never Used   Substance and Sexual Activity    Alcohol use: Not Currently     Comment: RARE    Drug use: Never    Sexual activity: Defer   Other Topics Concern    Not on file   Social History Narrative    Not on file     Social Determinants of Health     Financial Resource Strain: Low Risk  (6/4/2024)    Overall Financial Resource Strain (CARDIA)     Difficulty of Paying Living Expenses: Not very hard   Food Insecurity: Patient Unable To Answer (3/9/2024)    Hunger Vital Sign     Worried About Running Out of Food in the Last Year: Patient unable to answer     Ran Out of Food in the Last Year: Patient unable to answer   Transportation Needs: No Transportation Needs (6/4/2024)    PRAPARE - Transportation     Lack of Transportation (Medical): No     Lack of Transportation (Non-Medical): No   Physical Activity: Patient Declined (1/15/2024)    Exercise Vital Sign     Days of Exercise per Week: Patient declined     Minutes of Exercise per Session: Patient declined   Stress: No Stress Concern Present (1/15/2024)    [employer] [place] [employer] - Occupational Stress Questionnaire     Feeling of Stress : Not at all   Social Connections: Unknown (1/15/2024)    Social Connection and Isolation Panel [NHANES]     Frequency of Communication with Friends and Family: More than three times a week     Frequency of Social Gatherings with Friends and Family: More than three times a week     Attends Gnosticism Services: Patient declined     Active Member of Clubs or Organizations: Patient declined     Attends Club or Organization Meetings: Patient declined     Marital Status: Patient unable to answer   Intimate Partner Violence: Not At Risk (1/15/2024)     Humiliation, Afraid, Rape, and Kick questionnaire     Fear of Current or Ex-Partner: No     Emotionally Abused: No     Physically Abused: No     Sexually Abused: No   Housing Stability: High Risk (6/4/2024)    Housing Stability Vital Sign     Unable to Pay for Housing in the Last Year: No     Number of Places Lived in the Last Year: 3     Unstable Housing in the Last Year: No       SCREENINGS                        PHYSICAL EXAM    (up to 7 for level 4, 8 or more for level 5)     ED Triage Vitals   Temp Pulse Resp BP   -- -- -- --      SpO2 Temp src Heart Rate Source Patient Position   -- -- -- --      BP Location FiO2 (%)     -- --       Physical Exam  Constitutional:       Appearance: She is well-developed. She is ill-appearing.   HENT:      Head: Normocephalic and atraumatic.      Right Ear: Tympanic membrane normal.      Left Ear: Tympanic membrane normal.      Nose: Nose normal.      Mouth/Throat:      Mouth: Mucous membranes are moist.      Pharynx: Oropharynx is clear.   Eyes:      Extraocular Movements: Extraocular movements intact.      Pupils: Pupils are equal, round, and reactive to light.   Cardiovascular:      Rate and Rhythm: Normal rate and regular rhythm.      Pulses: Normal pulses.      Heart sounds: Normal heart sounds.   Pulmonary:      Effort: Pulmonary effort is normal.      Breath sounds: Normal breath sounds.   Abdominal:      General: Abdomen is flat.      Palpations: Abdomen is soft.      Tenderness: There is abdominal tenderness.   Musculoskeletal:         General: Normal range of motion.      Cervical back: Normal range of motion and neck supple.        Legs:       Comments: Patient has wounds bilaterally on the lower extremities.  Pulses are intact unable to elicit motor and sensation.   Skin:     General: Skin is warm.      Capillary Refill: Capillary refill takes less than 2 seconds.   Neurological:      General: No focal deficit present.      Mental Status: She is alert and oriented to  person, place, and time.   Psychiatric:         Mood and Affect: Mood normal.         Behavior: Behavior normal.          DIAGNOSTIC RESULTS     LABS:  Labs Reviewed   BLOOD CULTURE   BLOOD CULTURE   CBC WITH AUTO DIFFERENTIAL   COMPREHENSIVE METABOLIC PANEL   LACTATE   URINALYSIS WITH REFLEX CULTURE AND MICROSCOPIC    Narrative:     The following orders were created for panel order Urinalysis with Reflex Culture and Microscopic.  Procedure                               Abnormality         Status                     ---------                               -----------         ------                     Urinalysis with Reflex C...[[id number]]                                                 Extra Urine Gray Tube[[id number]]                                                         Please view results for these tests on the individual orders.   TROPONIN I, HIGH SENSITIVITY   BLOOD GAS VENOUS FULL PANEL   URINALYSIS WITH REFLEX CULTURE AND MICROSCOPIC   EXTRA URINE GRAY TUBE   TYPE AND SCREEN       All other labs were within normal range or not returned as of this dictation.    Imaging  CT chest abdomen pelvis wo IV contrast    (Results Pending)   CT head wo IV contrast    (Results Pending)        Procedures  Central Line    Performed by: Hector Felton MD  Authorized by: Peter Benson DO    Consent:     Consent obtained:  Emergent situation    Consent given by:  Patient    Risks, benefits, and alternatives were discussed: yes    Pre-procedure details:     Indication(s): central venous access, hemodynamic monitoring and insufficient peripheral access      Hand hygiene: Hand hygiene performed prior to insertion      Sterile barrier technique: All elements of maximal sterile technique followed      Skin preparation:  Chlorhexidine    Skin preparation agent: Skin preparation agent completely dried prior to procedure    Sedation:     Sedation type:  None  Anesthesia:     Anesthesia method:  Local infiltration    Local  anesthetic:  Lidocaine 1% w/o epi  Procedure details:     Location:  R internal jugular    Patient position:  Trendelenburg    Procedural supplies:  Triple lumen    Catheter size:  7 Fr    Landmarks identified: yes      Ultrasound guidance: yes      Ultrasound guidance timing: prior to insertion and real time      Sterile ultrasound techniques: Sterile gel and sterile probe covers were used      Number of attempts:  1    Successful placement: yes    Post-procedure details:     Post-procedure:  Dressing applied and line sutured    Assessment:  Blood return through all ports, no pneumothorax on x-ray, free fluid flow and placement verified by x-ray    Procedure completion:  Tolerated       EMERGENCY DEPARTMENT COURSE/MDM:   Medical Decision Making  63-year-old female presents to the emergency department with chief complaint of altered labs.  Medical management and treatment in the emergency department will consist of CBC, CMP, lactate, type and screen, blood cultures.  Will also be sending the patient for CT of the head and abdomen and pelvis without IV contrast.  Vancomycin Zosyn and metronidazole antibiotics have been started empirically.  Patient's blood pressure has been dropping.  We have administered sepsis bolus fluid.  Patient's labs show a hemoglobin of 7.5.  Patient also currently has an JED creatinine is elevated from baseline.  Will be starting peripheral Levophed to treat patient's blood pressure we will be admitting the patient to intensive care unit for further management.  Montano catheter with temperature probe will be placed.        ED Course as of 06/28/24 1141   Fri Jun 28, 2024   1019 HEMOGLOBIN(!): 7.5  Stable [JK]   1037 Patient's labs show gross abnormalities in her CBC and CMP.  Patient has an JED with a creatinine of 2.03 at baseline last 1.59.  However the creatinine is downtrending from 18 hours ago.  Magnesium is low we are replenishing [DS]   1103 Patient with an EF of 40 to 45% per chart  review, did not give full 30 mL/kg accordingly and will opt for 1500 mL [JK]      ED Course User Index  [DS] Hector Felton MD  [JK] Peter Benson,          Diagnoses as of 06/28/24 1141   Pancytopenia (Multi)   Septic shock (Multi)   Hypothermia, initial encounter   Acute kidney injury superimposed on CKD (CMS-HCC)   Altered mental status, unspecified altered mental status type        Patient and or family in agreement and understanding of treatment plan.  All questions answered.      I reviewed the case with the attending ED physician. The attending ED physician agrees with the plan. Patient and/or patient´s representative was counseled regarding labs, imaging, likely diagnosis, and plan. All questions were answered.    ED Medications administered this visit:    Medications   sodium chloride 0.9 % bolus 2,871 mL (has no administration in time range)   cefepime (Maxipime) IV 2 g (has no administration in time range)   metroNIDAZOLE (Flagyl) 500 mg in NaCl (iso-os) 100 mL (has no administration in time range)   vancomycin (Vancocin) in  mL IV 1.5 g (has no administration in time range)       New Prescriptions from this visit:    New Prescriptions    No medications on file       Follow-up:  No follow-up provider specified.      Final Impression: No diagnosis found.      (Please note that portions of this note were completed with a voice recognition program.  Efforts were made to edit the dictations but occasionally words are mis-transcribed.)     Hector Felton MD  Resident  06/28/24 1142       Hector Felton MD  Resident  06/28/24 1304

## 2024-06-28 NOTE — CARE PLAN
The patient's goals for the shift include  unable to assess    The clinical goals for the shift include to remain hemodynamically stable      Problem: Nutrition  Goal: Less than 5 days NPO/clear liquids  Outcome: Progressing  Goal: Oral intake greater than 50%  Outcome: Progressing  Goal: Oral intake greater 75%  Outcome: Progressing  Goal: Consume prescribed supplement  Outcome: Progressing  Goal: Adequate PO fluid intake  Outcome: Progressing  Goal: Nutrition support goals are met within 48 hrs  Outcome: Progressing  Goal: Nutrition support is meeting 75% of nutrient needs  Outcome: Progressing  Goal: Tube feed tolerance  Outcome: Progressing  Goal: BG  mg/dL  Outcome: Progressing  Goal: Lab values WNL  Outcome: Progressing  Goal: Electrolytes WNL  Outcome: Progressing  Goal: Promote healing  Outcome: Progressing  Goal: Maintain stable weight  Outcome: Progressing  Goal: Reduce weight from edema/fluid  Outcome: Progressing  Goal: Gradual weight gain  Outcome: Progressing  Goal: Improve ostomy output  Outcome: Progressing     Problem: Diabetes  Goal: Achieve decreasing blood glucose levels by end of shift  Outcome: Progressing  Goal: Increase stability of blood glucose readings by end of shift  Outcome: Progressing  Goal: Decrease in ketones present in urine by end of shift  Outcome: Progressing  Goal: Maintain electrolyte levels within acceptable range throughout shift  Outcome: Progressing  Goal: Maintain glucose levels >70mg/dl to <250mg/dl throughout shift  Outcome: Progressing  Goal: No changes in neurological exam by end of shift  Outcome: Progressing  Goal: Learn about and adhere to nutrition recommendations by end of shift  Outcome: Progressing  Goal: Vital signs within normal range for age by end of shift  Outcome: Progressing  Goal: Increase self care and/or family involovement by end of shift  Outcome: Progressing  Goal: Receive DSME education by end of shift  Outcome: Progressing     Problem:  Skin  Goal: Decreased wound size/increased tissue granulation at next dressing change  Outcome: Progressing  Goal: Participates in plan/prevention/treatment measures  Outcome: Progressing  Goal: Prevent/manage excess moisture  Outcome: Progressing  Goal: Prevent/minimize sheer/friction injuries  Outcome: Progressing  Goal: Promote/optimize nutrition  Outcome: Progressing  Goal: Promote skin healing  Outcome: Progressing     Problem: Pain - Adult  Goal: Verbalizes/displays adequate comfort level or baseline comfort level  Outcome: Progressing     Problem: Safety - Adult  Goal: Free from fall injury  Outcome: Progressing     Problem: Discharge Planning  Goal: Discharge to home or other facility with appropriate resources  Outcome: Progressing     Problem: Chronic Conditions and Co-morbidities  Goal: Patient's chronic conditions and co-morbidity symptoms are monitored and maintained or improved  Outcome: Progressing

## 2024-06-28 NOTE — CONSULTS
Vancomycin Dosing by Pharmacy- INITIAL    Bing Holliday is a 63 y.o. year old female who Pharmacy has been consulted for vancomycin dosing for pneumonia. Based on the patient's indication and renal status this patient will be dosed based on a goal trough/random level of 15-20.     Renal function is currently stable.    Visit Vitals  BP 85/59   Pulse 64   Temp 34 °C (93.2 °F)   Resp 15        Lab Results   Component Value Date    CREATININE 2.03 (H) 2024    CREATININE 2.22 (H) 2024    CREATININE 1.59 (H) 2024    CREATININE 1.46 (H) 2024        Patient weight is as follows:   Vitals:    24 1010   Weight: 95.7 kg (211 lb)       Cultures:  No results found for the encounter in last 14 days.        No intake/output data recorded.  I/O during current shift:  I/O this shift:  In: 1525.2 [I.V.:25.2; IV Piggyback:1500]  Out: 300 [Urine:300]    Temp (24hrs), Av.4 °C (92.1 °F), Min:32.3 °C (90.1 °F), Max:34.9 °C (94.8 °F)         Assessment/Plan     Patient received 1500 mg x1 in ED (15 mg/kg)  Will initiate vancomycin maintenance, by level.  Follow-up level will be ordered on  at 1100 unless clinically indicated sooner.  Will continue to monitor renal function daily while on vancomycin and order serum creatinine at least every 48 hours if not already ordered.  Follow for continued vancomycin needs, clinical response, and signs/symptoms of toxicity.       Taryn Resendiz, PharmD

## 2024-06-29 LAB
ACANTHOCYTES BLD QL SMEAR: ABNORMAL
ALBUMIN SERPL BCP-MCNC: 2.6 G/DL (ref 3.4–5)
ALBUMIN SERPL BCP-MCNC: 2.6 G/DL (ref 3.4–5)
ALP SERPL-CCNC: 120 U/L (ref 33–136)
ALT SERPL W P-5'-P-CCNC: 25 U/L (ref 7–45)
ANION GAP SERPL CALC-SCNC: 12 MMOL/L (ref 10–20)
ANION GAP SERPL CALC-SCNC: 13 MMOL/L (ref 10–20)
AST SERPL W P-5'-P-CCNC: 24 U/L (ref 9–39)
BACTERIA UR CULT: NORMAL
BASOPHILS # BLD MANUAL: 0 X10*3/UL (ref 0–0.1)
BASOPHILS NFR BLD MANUAL: 0 %
BILIRUB SERPL-MCNC: 0.3 MG/DL (ref 0–1.2)
BUN SERPL-MCNC: 38 MG/DL (ref 6–23)
BUN SERPL-MCNC: 41 MG/DL (ref 6–23)
BURR CELLS BLD QL SMEAR: ABNORMAL
CALCIUM SERPL-MCNC: 7.8 MG/DL (ref 8.6–10.3)
CALCIUM SERPL-MCNC: 8.4 MG/DL (ref 8.6–10.3)
CHLORIDE SERPL-SCNC: 114 MMOL/L (ref 98–107)
CHLORIDE SERPL-SCNC: 115 MMOL/L (ref 98–107)
CO2 SERPL-SCNC: 22 MMOL/L (ref 21–32)
CO2 SERPL-SCNC: 23 MMOL/L (ref 21–32)
CREAT SERPL-MCNC: 2.18 MG/DL (ref 0.5–1.05)
CREAT SERPL-MCNC: 2.38 MG/DL (ref 0.5–1.05)
EGFRCR SERPLBLD CKD-EPI 2021: 23 ML/MIN/1.73M*2
EGFRCR SERPLBLD CKD-EPI 2021: 25 ML/MIN/1.73M*2
EOSINOPHIL # BLD MANUAL: 0 X10*3/UL (ref 0–0.7)
EOSINOPHIL NFR BLD MANUAL: 0 %
ERYTHROCYTE [DISTWIDTH] IN BLOOD BY AUTOMATED COUNT: 20.8 % (ref 11.5–14.5)
GIANT PLATELETS BLD QL SMEAR: ABNORMAL
GLUCOSE BLD MANUAL STRIP-MCNC: 150 MG/DL (ref 74–99)
GLUCOSE BLD MANUAL STRIP-MCNC: 164 MG/DL (ref 74–99)
GLUCOSE BLD MANUAL STRIP-MCNC: 183 MG/DL (ref 74–99)
GLUCOSE BLD MANUAL STRIP-MCNC: 191 MG/DL (ref 74–99)
GLUCOSE BLD MANUAL STRIP-MCNC: 197 MG/DL (ref 74–99)
GLUCOSE BLD MANUAL STRIP-MCNC: 236 MG/DL (ref 74–99)
GLUCOSE SERPL-MCNC: 171 MG/DL (ref 74–99)
GLUCOSE SERPL-MCNC: 194 MG/DL (ref 74–99)
HCT VFR BLD AUTO: 23.1 % (ref 36–46)
HGB BLD-MCNC: 7 G/DL (ref 12–16)
HYPOCHROMIA BLD QL SMEAR: ABNORMAL
IMM GRANULOCYTES # BLD AUTO: 0.05 X10*3/UL (ref 0–0.7)
IMM GRANULOCYTES NFR BLD AUTO: 0.9 % (ref 0–0.9)
LYMPHOCYTES # BLD MANUAL: 0.84 X10*3/UL (ref 1.2–4.8)
LYMPHOCYTES NFR BLD MANUAL: 15 %
MAGNESIUM SERPL-MCNC: 1.67 MG/DL (ref 1.6–2.4)
MCH RBC QN AUTO: 30.3 PG (ref 26–34)
MCHC RBC AUTO-ENTMCNC: 30.3 G/DL (ref 32–36)
MCV RBC AUTO: 100 FL (ref 80–100)
MONOCYTES # BLD MANUAL: 0.17 X10*3/UL (ref 0.1–1)
MONOCYTES NFR BLD MANUAL: 3 %
NEUTROPHILS # BLD MANUAL: 4.59 X10*3/UL (ref 1.2–7.7)
NEUTS BAND # BLD MANUAL: 0.39 X10*3/UL (ref 0–0.7)
NEUTS BAND NFR BLD MANUAL: 7 %
NEUTS SEG # BLD MANUAL: 4.2 X10*3/UL (ref 1.2–7)
NEUTS SEG NFR BLD MANUAL: 75 %
NRBC BLD-RTO: 1.6 /100 WBCS (ref 0–0)
OVALOCYTES BLD QL SMEAR: ABNORMAL
PHOSPHATE SERPL-MCNC: 3.8 MG/DL (ref 2.5–4.9)
PHOSPHATE SERPL-MCNC: 3.9 MG/DL (ref 2.5–4.9)
PLATELET # BLD AUTO: 73 X10*3/UL (ref 150–450)
POTASSIUM SERPL-SCNC: 4.1 MMOL/L (ref 3.5–5.3)
POTASSIUM SERPL-SCNC: 4.4 MMOL/L (ref 3.5–5.3)
PROT SERPL-MCNC: 5.4 G/DL (ref 6.4–8.2)
RBC # BLD AUTO: 2.31 X10*6/UL (ref 4–5.2)
RBC MORPH BLD: ABNORMAL
SODIUM SERPL-SCNC: 145 MMOL/L (ref 136–145)
SODIUM SERPL-SCNC: 146 MMOL/L (ref 136–145)
TOTAL CELLS COUNTED BLD: 100
TOXIC GRANULES BLD QL SMEAR: PRESENT
WBC # BLD AUTO: 5.6 X10*3/UL (ref 4.4–11.3)

## 2024-06-29 PROCEDURE — 2500000004 HC RX 250 GENERAL PHARMACY W/ HCPCS (ALT 636 FOR OP/ED)

## 2024-06-29 PROCEDURE — 2020000001 HC ICU ROOM DAILY

## 2024-06-29 PROCEDURE — 85007 BL SMEAR W/DIFF WBC COUNT: CPT

## 2024-06-29 PROCEDURE — 85027 COMPLETE CBC AUTOMATED: CPT

## 2024-06-29 PROCEDURE — C9113 INJ PANTOPRAZOLE SODIUM, VIA: HCPCS | Performed by: STUDENT IN AN ORGANIZED HEALTH CARE EDUCATION/TRAINING PROGRAM

## 2024-06-29 PROCEDURE — 84100 ASSAY OF PHOSPHORUS: CPT

## 2024-06-29 PROCEDURE — 2500000004 HC RX 250 GENERAL PHARMACY W/ HCPCS (ALT 636 FOR OP/ED): Performed by: STUDENT IN AN ORGANIZED HEALTH CARE EDUCATION/TRAINING PROGRAM

## 2024-06-29 PROCEDURE — 37799 UNLISTED PX VASCULAR SURGERY: CPT

## 2024-06-29 PROCEDURE — 2500000005 HC RX 250 GENERAL PHARMACY W/O HCPCS

## 2024-06-29 PROCEDURE — 80069 RENAL FUNCTION PANEL: CPT | Mod: CCI

## 2024-06-29 PROCEDURE — 99291 CRITICAL CARE FIRST HOUR: CPT

## 2024-06-29 PROCEDURE — 83735 ASSAY OF MAGNESIUM: CPT

## 2024-06-29 PROCEDURE — 84075 ASSAY ALKALINE PHOSPHATASE: CPT

## 2024-06-29 PROCEDURE — 82947 ASSAY GLUCOSE BLOOD QUANT: CPT | Mod: 91

## 2024-06-29 RX ORDER — OLANZAPINE 10 MG/2ML
5 INJECTION, POWDER, FOR SOLUTION INTRAMUSCULAR ONCE
Status: DISCONTINUED | OUTPATIENT
Start: 2024-06-29 | End: 2024-06-29

## 2024-06-29 RX ORDER — OLANZAPINE 10 MG/2ML
5 INJECTION, POWDER, FOR SOLUTION INTRAMUSCULAR EVERY 12 HOURS PRN
Status: DISCONTINUED | OUTPATIENT
Start: 2024-06-30 | End: 2024-06-30

## 2024-06-29 RX ORDER — OLANZAPINE 10 MG/2ML
5 INJECTION, POWDER, FOR SOLUTION INTRAMUSCULAR ONCE AS NEEDED
Status: COMPLETED | OUTPATIENT
Start: 2024-06-29 | End: 2024-06-30

## 2024-06-29 RX ORDER — HYDROMORPHONE HYDROCHLORIDE 0.2 MG/ML
0.2 INJECTION INTRAMUSCULAR; INTRAVENOUS; SUBCUTANEOUS ONCE
Status: COMPLETED | OUTPATIENT
Start: 2024-06-29 | End: 2024-06-29

## 2024-06-29 RX ORDER — MAGNESIUM SULFATE HEPTAHYDRATE 40 MG/ML
2 INJECTION, SOLUTION INTRAVENOUS ONCE
Status: COMPLETED | OUTPATIENT
Start: 2024-06-29 | End: 2024-06-29

## 2024-06-29 RX ORDER — DEXTROSE MONOHYDRATE 50 MG/ML
75 INJECTION, SOLUTION INTRAVENOUS CONTINUOUS
Status: DISCONTINUED | OUTPATIENT
Start: 2024-06-29 | End: 2024-06-30

## 2024-06-29 ASSESSMENT — COGNITIVE AND FUNCTIONAL STATUS - GENERAL
HELP NEEDED FOR BATHING: TOTAL
TOILETING: TOTAL
PERSONAL GROOMING: TOTAL
HELP NEEDED FOR BATHING: TOTAL
DAILY ACTIVITIY SCORE: 6
PERSONAL GROOMING: TOTAL
EATING MEALS: TOTAL
TURNING FROM BACK TO SIDE WHILE IN FLAT BAD: TOTAL
DRESSING REGULAR LOWER BODY CLOTHING: TOTAL
CLIMB 3 TO 5 STEPS WITH RAILING: TOTAL
EATING MEALS: TOTAL
WALKING IN HOSPITAL ROOM: TOTAL
WALKING IN HOSPITAL ROOM: TOTAL
STANDING UP FROM CHAIR USING ARMS: TOTAL
TOILETING: TOTAL
DRESSING REGULAR UPPER BODY CLOTHING: TOTAL
MOBILITY SCORE: 6
TURNING FROM BACK TO SIDE WHILE IN FLAT BAD: TOTAL
DRESSING REGULAR UPPER BODY CLOTHING: TOTAL
MOVING FROM LYING ON BACK TO SITTING ON SIDE OF FLAT BED WITH BEDRAILS: TOTAL
CLIMB 3 TO 5 STEPS WITH RAILING: TOTAL
MOVING TO AND FROM BED TO CHAIR: TOTAL
MOBILITY SCORE: 6
MOVING TO AND FROM BED TO CHAIR: TOTAL
DAILY ACTIVITIY SCORE: 6
DRESSING REGULAR LOWER BODY CLOTHING: TOTAL
STANDING UP FROM CHAIR USING ARMS: TOTAL
MOVING FROM LYING ON BACK TO SITTING ON SIDE OF FLAT BED WITH BEDRAILS: TOTAL

## 2024-06-29 ASSESSMENT — PAIN - FUNCTIONAL ASSESSMENT
PAIN_FUNCTIONAL_ASSESSMENT: CPOT (CRITICAL CARE PAIN OBSERVATION TOOL)

## 2024-06-29 NOTE — NURSING NOTE
2200 APRN at bedside with ultrasound to visualize arteries. Continues to have low BP. Levo 0.10 mcg/min since 2032. Dextrose 5% IVF infusion started at 2054. Q15 minute vitals. Will continue to monitor.    2220 APRN placing A-line to R Radial. APRN placed patient on a Venti-mask 14L 55%. SpO2 97%. Patient only on VM for about an hour then transitioned to 6L NC and then, by morning 2L NC.     IVF Dextrose 5% infused through the night. BP's were responsive with MAP's >65. HR also decreased from the low 100's to 70-80.     Output 250 clear yellow for 12 hour shift.

## 2024-06-29 NOTE — PROGRESS NOTES
Physical Therapy                 Therapy Communication Note    Patient Name: Bing Holliday  MRN: [id number]  Today's Date: 6/29/2024     Discipline: Physical Therapy    Missed Visit Reason:      Missed Time: Attempt    Comment: Attempted PT eval. Nursing recommending waiting on eval until tomorrow due to pt currently in too much pain and hypersensitive to touch, making movement very painful. Will attempt tomorrow as able.

## 2024-06-29 NOTE — CARE PLAN
The patient's goals for the shift include  deferred    The clinical goals for the shift include MAP's to remain >65    Over the shift, the patient did not make progress toward the following goals. Barriers to progression include acuteness of illness. Recommendations to address these barriers include continue with plan of care.

## 2024-06-29 NOTE — PROGRESS NOTES
Critical Care Daily Progress        Subjective   Patient is a 63 y.o. female admitted on 6/28/2024  9:42 AM with the following indication(s) for ICU care Septic shock 2/2 PNA in setting of adrenal insufficiency requiring vasopressor support.     Overnight Events: Arterial line was placed with increasing pressor requirement. D5W was started due to hypernatremia    Complaints: has none..     Scheduled Medications:   [Held by provider] atorvastatin, 40 mg, oral, Nightly  heparin, 5,000 Units, subcutaneous, q12h  hydrocortisone sodium succinate, 100 mg, intravenous, q8h  [Held by provider] insulin lispro, 0-5 Units, subcutaneous, q4h  levETIRAcetam, 500 mg, intravenous, q12h  magnesium sulfate, 2 g, intravenous, Once  pantoprazole, 40 mg, intravenous, Daily before breakfast  piperacillin-tazobactam, 3.375 g, intravenous, q8h         Continuous Medications:   dextrose 5%, 75 mL/hr, Last Rate: 75 mL/hr (06/29/24 0680)  norepinephrine, 0.01-0.5 mcg/kg/min, Last Rate: 0.1 mcg/kg/min (06/29/24 8767)         PRN Medications:   PRN medications: dextrose, dextrose, glucagon, glucagon, magnesium sulfate, magnesium sulfate, potassium chloride    Objective   Vitals:  Temp  Min: 32.3 °C (90.1 °F)  Max: 37.2 °C (99 °F)  Pulse  Min: 58  Max: 103  BP  Min: 61/40  Max: 102/72  Resp  Min: 10  Max: 61  SpO2  Min: 57 %  Max: 100 %    Physical Exam:   Physical Exam   Physical Exam  Constitutional: A&Ox0, appears frail, cachectic, ill appearing  Head and Face: Atraumatic, normocephalic   Eyes: Normal external exam, EOMI  ENT: Normal external inspection of ears and nose. Oropharynx normal.  Cardiovascular: RRR, S1/S2, no murmurs, rubs, or gallops, radial pulses +2  Pulmonary: CTAB, no respiratory distress, no wheezing, rales or rhonchi, on RA  Abdomen: +BS, soft, non-tender, nondistended, no guarding rigidity or rebound tenderness, no masses noted  MSK: Pitting edema of legs b/l improving, No joint swelling, normal movements of all  extremities.   Neuro: No focal deficits, normal motor function, normal sensation, follows all commands  Skin- lower extremity wounds b/l without erythema or drainage       Assessment/Plan   Patient is a 63 y.o. 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   -Desaturation to 84% on RA, placed on 6 L  -Has poor inspiratory effort  -Duonebs q6     Gastrointestinal: No active concerns  -History: GERD  -Home meds: Pantoprazole  - Diet: NPO  - Supplementation: None  - Prophylaxis:  protonix  -  Bowel regimen: None  - Last BM:    - Holding off on NG tube placement with plt <100     Renal: JED on CKD3, likely pre-renal in setting of decreased PO intake +/- ischemia ATN. 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  -History: recurrent adrenal insufficiency (hydrocortisone), 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  -Home meds: Lantus 10 units in a.m., sliding scale, Cortef 20 mg in a.m./20 mg p.m., flu cortisone 0.1 mg every other day  - sliding scale: Holding with hx of refractory hypoglycemia and NPO  - Solu cortef 100mg q8  - Endocrinology consulted  - Hypoglycemia protocol  - Holding mycophenolate in setting of infection (does this need to be held indefinitely in setting of recurrent infections/hospitalizations?)   Results from last 7 days   Lab Units 06/29/24  0449 06/29/24  0329 06/29/24  0014 06/28/24  1947 06/28/24  1552 06/28/24  1405 06/28/24  0958   POCT GLUCOSE mg/dL 197*  --  164* 110* 136* 116*  --    GLUCOSE mg/dL  --  194*  --   --   --   --  126*      -BG < 180 goal    Hematology: No active concerns  - Daily CBC, coags  -History: Pancytopenia   -Home meds: Eliquis 2.5 mg twice daily  Results from last 7 days   Lab Units 06/29/24  0329 06/28/24  0958 06/27/24  1554   WBC AUTO x10*3/uL 5.6 4.0* 4.3*   HEMOGLOBIN g/dL 7.0* 7.5* 7.1*   HEMATOCRIT % 23.1* 25.3* 23.6*   PLATELETS AUTO x10*3/uL 73* 57* 63*     - DVT Prophylaxis: Heparin subcutaneous 5000U  BID     Infectious Disease: Sepsis 2/2 PNA vs UTI   -History: -History: Recently treated with rocephin for lower leg wound infection at [place]   -MRSA negative  Temp (24hrs), Av °C (96.8 °F), Min:32.3 °C (90.1 °F), Max:37.2 °C (99 °F)     -Abx: Vancomycin (-), Cefepime (-), Zosyn (-)  -Cultures:               Blood Culture : No growth to date              Urine Culture :    Musculoskeletal:   - PT to see   - OT to see     LDA:  CVC 24 Triple lumen Right Internal jugular (Active)   Placement Date/Time: 24 1300   Placed by External Staff?: (c) Other (Comment)  Hand Hygiene Performed Prior to CVC Insertion: Yes  Site Prep: Chlorhexidine   Site Prep Agent has Completely Dried Before Insertion: Yes  All 5 Sterile Barriers Used...   Number of days: 0       Arterial Line 24 Right Radial (Active)   Placement Date/Time: 24 (c) 2248   Size: 20 G  Orientation: Right  Location: Radial  Site Prep: Chlorhexidine   Local Anesthetic: Injectable  Insertion attempts: 1  Securement Method: Transparent dressing;Taped  Patient Tolerance: Tolerated well   Number of days: 0       Urethral Catheter Temperature probe 16 Fr. (Active)   Placement Date/Time: 24 1148   Hand Hygiene Completed: Yes  Catheter Type: Temperature probe  Tube Size (Fr.): 16 Fr.  Catheter Balloon Size: 10 mL  Urine Returned: Yes   Number of days: 0       External Urinary Catheter Female (Active)   Placement Date/Time: 24 0005   Placed by: Jaswant WING  Hand Hygiene Completed: Yes  External Catheter Type: Female   Number of days: 22         CODE STATUS: DNR and No Intubation    Disposition: Patient to remain in ICU requiring pressor support.    ABCDEF Checklist  Analgesia: Spontaneous awakening trial to be pursued if clinically appropriate. RASS goal reviewed  Breathing: Spontaneous breathing trial to be pursued if clinically appropriate. Mechanical power of assisted ventilation reviewed  Choice of  analgesia/sedation: Analgesic and sedative agents adjusted per clinical context.   Delirium assessed by CAM, will avoid exacerbating factors  Early mobility and exercise: Physical and occupational therapy engaged  Family: Plan of care, overall trajectory of patient shared with family. Questions elicited and answered as appropriate.     Critical care time: 60 mins      Case discussed with attending , Dr. Tomas Silverio, DO  Internal Medicine, PGY-1

## 2024-06-29 NOTE — CARE PLAN
Problem: Nutrition  Goal: Less than 5 days NPO/clear liquids  6/29/2024 0730 by Ashok Andre RN  Outcome: Progressing  6/29/2024 0041 by Ashok Andre RN  Outcome: Progressing  Goal: Oral intake greater than 50%  6/29/2024 0730 by Ashok Andre RN  Outcome: Progressing  6/29/2024 0041 by Ashok Andre RN  Outcome: Progressing  Goal: Oral intake greater 75%  6/29/2024 0730 by Ashok Andre RN  Outcome: Progressing  6/29/2024 0041 by Ashok Andre RN  Outcome: Progressing  Goal: Consume prescribed supplement  6/29/2024 0730 by Ashok Andre RN  Outcome: Progressing  6/29/2024 0041 by Ashok Andre RN  Outcome: Progressing  Goal: Adequate PO fluid intake  6/29/2024 0730 by Ashok Andre RN  Outcome: Progressing  6/29/2024 0041 by Ashok Andre RN  Outcome: Progressing  Goal: Nutrition support goals are met within 48 hrs  6/29/2024 0730 by Ashok Andre RN  Outcome: Progressing  6/29/2024 0041 by Ashok Andre RN  Outcome: Progressing  Goal: Nutrition support is meeting 75% of nutrient needs  6/29/2024 0730 by Ashok Andre RN  Outcome: Progressing  6/29/2024 0041 by Ashok Andre RN  Outcome: Progressing  Goal: Tube feed tolerance  6/29/2024 0730 by Ashok Andre RN  Outcome: Progressing  6/29/2024 0041 by Ashok Andre RN  Outcome: Progressing  Goal: BG  mg/dL  6/29/2024 0730 by Ashok Andre RN  Outcome: Progressing  6/29/2024 0041 by Ashok Andre RN  Outcome: Progressing  Goal: Lab values WNL  6/29/2024 0730 by Ashok Andre RN  Outcome: Progressing  6/29/2024 0041 by Ashok Andre RN  Outcome: Progressing  Goal: Electrolytes WNL  6/29/2024 0730 by Ashok Andre RN  Outcome: Progressing  6/29/2024 0041 by Ashok Andre RN  Outcome: Progressing  Goal: Promote healing  6/29/2024 0730 by Ashok Jes, RN  Outcome: Progressing  6/29/2024 0041 by Ashok Andre RN  Outcome: Progressing  Goal: Maintain stable weight  6/29/2024 0730 by Ashok Andre RN  Outcome: Progressing  6/29/2024 0041 by Ashok Andre,  RN  Outcome: Progressing  Goal: Reduce weight from edema/fluid  6/29/2024 0730 by Ashok Andre RN  Outcome: Progressing  6/29/2024 0041 by Ashok Andre RN  Outcome: Progressing  Goal: Gradual weight gain  6/29/2024 0730 by Ashok Andre RN  Outcome: Progressing  6/29/2024 0041 by Ashok Andre RN  Outcome: Progressing  Goal: Improve ostomy output  6/29/2024 0730 by Ashok Andre RN  Outcome: Progressing  6/29/2024 0041 by Ashok Andre RN  Outcome: Progressing     Problem: Diabetes  Goal: Achieve decreasing blood glucose levels by end of shift  6/29/2024 0730 by Ashok Andre RN  Outcome: Progressing  6/29/2024 0041 by Ashok Andre RN  Outcome: Progressing  Goal: Increase stability of blood glucose readings by end of shift  6/29/2024 0730 by Ashok Andre RN  Outcome: Progressing  6/29/2024 0041 by Ashok Andre RN  Outcome: Progressing  Goal: Decrease in ketones present in urine by end of shift  6/29/2024 0730 by Ashok Andre RN  Outcome: Progressing  6/29/2024 0041 by Ashok Andre RN  Outcome: Progressing  Goal: Maintain electrolyte levels within acceptable range throughout shift  6/29/2024 0730 by Ashok Andre RN  Outcome: Progressing  6/29/2024 0041 by Ashok Andre RN  Outcome: Progressing  Goal: Maintain glucose levels >70mg/dl to <250mg/dl throughout shift  6/29/2024 0730 by Ashok Andre RN  Outcome: Progressing  6/29/2024 0041 by Ashok Andre RN  Outcome: Progressing  Goal: No changes in neurological exam by end of shift  6/29/2024 0730 by Ashok Andre RN  Outcome: Progressing  6/29/2024 0041 by Ashok Andre RN  Outcome: Progressing  Goal: Learn about and adhere to nutrition recommendations by end of shift  6/29/2024 0730 by Ashok Andre RN  Outcome: Progressing  6/29/2024 0041 by Ashok Andre RN  Outcome: Progressing  Goal: Vital signs within normal range for age by end of shift  6/29/2024 0730 by Ashok Jes, RN  Outcome: Progressing  6/29/2024 0041 by Ashok Andre RN  Outcome:  Progressing  Goal: Increase self care and/or family involovement by end of shift  6/29/2024 0730 by Ashok Andre RN  Outcome: Progressing  6/29/2024 0041 by Ashok Andre RN  Outcome: Progressing  Goal: Receive DSME education by end of shift  6/29/2024 0730 by Ashok Andre RN  Outcome: Progressing  6/29/2024 0041 by Ashok Ander RN  Outcome: Progressing     Problem: Skin  Goal: Decreased wound size/increased tissue granulation at next dressing change  6/29/2024 0730 by Ahsok Andre RN  Outcome: Progressing  Flowsheets (Taken 6/29/2024 0730)  Decreased wound size/increased tissue granulation at next dressing change:   Promote sleep for wound healing   Protective dressings over bony prominences  6/29/2024 0041 by Ashok Andre RN  Outcome: Progressing  Goal: Participates in plan/prevention/treatment measures  6/29/2024 0730 by Ashok Andre RN  Outcome: Progressing  Flowsheets (Taken 6/29/2024 0730)  Participates in plan/prevention/treatment measures: Elevate heels  6/29/2024 0041 by Ashok Andre RN  Outcome: Progressing  Goal: Prevent/manage excess moisture  6/29/2024 0730 by Ashok Andre RN  Outcome: Progressing  Flowsheets (Taken 6/29/2024 0730)  Prevent/manage excess moisture:   Cleanse incontinence/protect with barrier cream   Moisturize dry skin  6/29/2024 0041 by Ashok Andre RN  Outcome: Progressing  Goal: Prevent/minimize sheer/friction injuries  6/29/2024 0730 by Ashok Andre RN  Outcome: Progressing  Flowsheets (Taken 6/29/2024 0730)  Prevent/minimize sheer/friction injuries: Turn/reposition every 2 hours/use positioning/transfer devices  6/29/2024 0041 by Ashok Andre RN  Outcome: Progressing  Goal: Promote/optimize nutrition  6/29/2024 0730 by Ashok Andre RN  Outcome: Progressing  Flowsheets (Taken 6/29/2024 0730)  Promote/optimize nutrition: Discuss with provider if NPO > 2 days  6/29/2024 0041 by Ashok Andre RN  Outcome: Progressing  Goal: Promote skin healing  6/29/2024 0730 by Ashok  AMINAH Andre  Outcome: Progressing  Flowsheets (Taken 6/29/2024 0730)  Promote skin healing: Turn/reposition every 2 hours/use positioning/transfer devices  6/29/2024 0041 by Ashok Andre RN  Outcome: Progressing     Problem: Pain - Adult  Goal: Verbalizes/displays adequate comfort level or baseline comfort level  6/29/2024 0730 by Ashok Andre RN  Outcome: Progressing  6/29/2024 0041 by Ashok Andre RN  Outcome: Progressing     Problem: Safety - Adult  Goal: Free from fall injury  6/29/2024 0730 by Ashok Andre RN  Outcome: Progressing  6/29/2024 0041 by Ashok Andre RN  Outcome: Progressing     Problem: Discharge Planning  Goal: Discharge to home or other facility with appropriate resources  6/29/2024 0730 by Ashok Andre RN  Outcome: Progressing  6/29/2024 0041 by Ashok Andre RN  Outcome: Progressing     Problem: Chronic Conditions and Co-morbidities  Goal: Patient's chronic conditions and co-morbidity symptoms are monitored and maintained or improved  6/29/2024 0730 by Ashok Andre RN  Outcome: Progressing  6/29/2024 0041 by Ashok Andre RN  Outcome: Progressing   The patient's goals for the shift include      The clinical goals for the shift include MAP's to remain >65

## 2024-06-29 NOTE — CARE PLAN
Problem: Nutrition  Goal: Less than 5 days NPO/clear liquids  Outcome: Progressing  Goal: Oral intake greater than 50%  Outcome: Progressing  Goal: Oral intake greater 75%  Outcome: Progressing  Goal: Consume prescribed supplement  Outcome: Progressing  Goal: Adequate PO fluid intake  Outcome: Progressing  Goal: Nutrition support goals are met within 48 hrs  Outcome: Progressing  Goal: Nutrition support is meeting 75% of nutrient needs  Outcome: Progressing  Goal: Tube feed tolerance  Outcome: Progressing  Goal: BG  mg/dL  Outcome: Progressing  Goal: Lab values WNL  Outcome: Progressing  Goal: Electrolytes WNL  Outcome: Progressing  Goal: Promote healing  Outcome: Progressing  Goal: Maintain stable weight  Outcome: Progressing  Goal: Reduce weight from edema/fluid  Outcome: Progressing  Goal: Gradual weight gain  Outcome: Progressing  Goal: Improve ostomy output  Outcome: Progressing     Problem: Diabetes  Goal: Achieve decreasing blood glucose levels by end of shift  Outcome: Progressing  Goal: Increase stability of blood glucose readings by end of shift  Outcome: Progressing  Goal: Decrease in ketones present in urine by end of shift  Outcome: Progressing  Goal: Maintain electrolyte levels within acceptable range throughout shift  Outcome: Progressing  Goal: Maintain glucose levels >70mg/dl to <250mg/dl throughout shift  Outcome: Progressing  Goal: No changes in neurological exam by end of shift  Outcome: Progressing  Goal: Learn about and adhere to nutrition recommendations by end of shift  Outcome: Progressing  Goal: Vital signs within normal range for age by end of shift  Outcome: Progressing  Goal: Increase self care and/or family involovement by end of shift  Outcome: Progressing  Goal: Receive DSME education by end of shift  Outcome: Progressing     Problem: Skin  Goal: Decreased wound size/increased tissue granulation at next dressing change  Outcome: Progressing  Goal: Participates in  plan/prevention/treatment measures  Outcome: Progressing  Goal: Prevent/manage excess moisture  Outcome: Progressing  Goal: Prevent/minimize sheer/friction injuries  Outcome: Progressing  Goal: Promote/optimize nutrition  Outcome: Progressing  Goal: Promote skin healing  Outcome: Progressing     Problem: Pain - Adult  Goal: Verbalizes/displays adequate comfort level or baseline comfort level  Outcome: Progressing     Problem: Safety - Adult  Goal: Free from fall injury  Outcome: Progressing     Problem: Discharge Planning  Goal: Discharge to home or other facility with appropriate resources  Outcome: Progressing     Problem: Chronic Conditions and Co-morbidities  Goal: Patient's chronic conditions and co-morbidity symptoms are monitored and maintained or improved  Outcome: Progressing   The patient's goals for the shift include      The clinical goals for the shift include to maintain a MAP goal of 60-70. Levo needed increased to 0.10 mcg/min and IVF Dextrose 5% were added 75 mL/hr.

## 2024-06-29 NOTE — PROGRESS NOTES
"Nutrition Initial Assessment:   Nutrition Assessment    Reason for Assessment: Admission nursing screening (MST=3; weight loss/poor appetite/yes to RD consult to \"establish baseline\")    Patient is a 63 y.o. female presenting with AMS and abnormal labs admitted to ICU for metabolic encephalopathy secondary to multifactorial sepsis with adrenal insufficiency. Code status changed yesterday from full code to DNR/DNI. Family at bedside this afternoon agreeing that enteral feeding is the best option at this time for nutrition.    Past Medical History:   Diagnosis Date    Abnormal kidney function 04/04/2023    Acute headache 03/10/2024    Acute low back pain 10/30/2023    Acute upper respiratory infection, unspecified 03/04/2020    Acute URI    Acute upper respiratory infection, unspecified 09/30/2015    URTI (acute upper respiratory infection)    Arthritis     Atypical facial pain 03/10/2024    Body mass index (BMI) 23.0-23.9, adult 10/15/2021    BMI 23.0-23.9, adult    Body mass index (BMI) 33.0-33.9, adult 03/04/2020    BMI 33.0-33.9,adult    Cardiomegaly 08/27/2013    Left ventricular hypertrophy    Chest pain 06/10/2022    Comment on above: Added by Problem List Migration; 2013-3-12; Moved to Suppressed Nov 25 2013 9:16PM; Comment on above: Added by Problem List Migration; 2013-3-12; Moved to Suppressed Nov 25 2013 9:16PM;    Chronic kidney disease, stage 3 unspecified (Multi) 07/02/2013    Chronic kidney disease, stage III (moderate)    Confusional state 03/10/2024    Contact with and (suspected) exposure to covid-19 04/04/2023    Delirium 03/10/2024    Disease of pericardium, unspecified ([facility]) 07/02/2013    Pericardial disease    Encounter for follow-up examination after completed treatment for conditions other than malignant neoplasm 10/06/2022    Hospital discharge follow-up    Generalized contraction of visual field, right eye 01/29/2015    Generalized contraction of visual field of right eye    Hearing loss " 03/10/2024    History of cataract 03/10/2024    History of thrombocytopenia 03/10/2024    Comment on above: Added by Problem List Migration; 2013-7-2;    Homonymous bilateral field defects, right side 04/29/2016    Homonymous bilateral field defects of right side    Hypertensive chronic kidney disease with stage 1 through stage 4 chronic kidney disease, or unspecified chronic kidney disease 07/02/2013    Nephrosclerosis    Laceration without foreign body, left foot, initial encounter 07/03/2018    Foot laceration, left, initial encounter    Localized edema 03/10/2024    Mass of skin 03/10/2024    Migraine with aura, not intractable, without status migrainosus 10/24/2022    Ocular migraine    Open wound 03/10/2024    Open wound of left foot 03/10/2024    Other conditions influencing health status 07/02/2013    Chronic Glomerulonephritis In Diseases Classified Elsewhere    Other conditions influencing health status 07/02/2013    Progressive Familial Myoclonic Epilepsy    Other conditions influencing health status 07/02/2013    Protein S Deficiency    Other conditions influencing health status 05/22/2015    Familial Combined Hyperlipidemia    Other conditions influencing health status 10/24/2022    IDDM (insulin dependent diabetes mellitus)    Other conditions influencing health status 03/14/2022    Diabetes mellitus, insulin dependent (IDDM), uncontrolled    Other long term (current) drug therapy 10/24/2022    Long-term use of Plaquenil    Overweight with body mass index (BMI) 25.0-29.9 03/10/2024    Pain of toe 03/10/2024    Personal history of COVID-19 04/04/2023    Personal history of diseases of the blood and blood-forming organs and certain disorders involving the immune mechanism 07/02/2013    History of thrombocytopenia    Personal history of diseases of the skin and subcutaneous tissue 08/11/2015    History of foot ulcer    Personal history of nephrotic syndrome 07/02/2013    History of nephrotic syndrome     Personal history of other diseases of the circulatory system 08/27/2013    History of sinus tachycardia    Personal history of other diseases of the nervous system and sense organs     History of cataract    Personal history of other diseases of the respiratory system     History of bronchitis    Personal history of other infectious and parasitic diseases 07/02/2013    History of hepatitis    Personal history of other specified conditions     History of shortness of breath    Personal history of other specified conditions 08/27/2013    History of edema    Posterior epistaxis 03/10/2024    Puckering of macula, right eye 10/24/2022    ERM OD (epiretinal membrane, right eye)    Raynaud's syndrome without gangrene 07/02/2013    Raynaud's disease    Sinusitis 03/10/2024    Systemic lupus erythematosus, unspecified (Multi) 07/24/2015    SLE (systemic lupus erythematosus)    Systemic lupus erythematosus, unspecified (Multi) 07/24/2015    SLE (systemic lupus erythematosus)    Systemic lupus erythematosus, unspecified (Multi) 07/24/2015    Systemic lupus    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) 07/02/2013    Type 2 diabetes with nephropathy    Type 2 diabetes mellitus with mild nonproliferative diabetic retinopathy without macular edema, left eye (Multi) 07/27/2015    Non-proliferative diabetic retinopathy, left eye    Type 2 diabetes mellitus with mild nonproliferative diabetic retinopathy without macular edema, unspecified eye (Multi) 07/24/2015    Mild non proliferative diabetic retinopathy    Type 2 diabetes mellitus with proliferative diabetic retinopathy without macular edema, right eye (Multi) 07/27/2015    Proliferative diabetic retinopathy of right eye    Type 2 diabetes mellitus with proliferative diabetic retinopathy without macular edema, unspecified eye (Multi) 07/24/2015    Diabetic proliferative retinopathy    Unspecified acute and subacute iridocyclitis 07/24/2015    Acute iritis, right  "eye    Unspecified open wound, left foot, sequela 07/03/2018    Wound, open, foot, left, sequela         Nutrition History:  Energy Intake:  (Pt NPO. She was eating >75% of her meals at [facility] 2 weeks ago, but nursing home reported minimal intakes since admit.)  Food and Nutrient History: Pt has been hospitalized every month (5 times) since March 2024. She has been in a nursing home since April and recently moved to a new nursing home 2 weeks ago after her most recent hospitalization in [place]. Call placed to [facility] and nurse explained that pt was a 1-person assist when she admitted 6/17, but last Monday (6/24) she noted that pt was suddenly a 2-person assist and seemed to be rapidly declining in her mental status as well. Labs were drawn and found to be abnormal so she was sent to the hospital (again). Nurse reports she never ate very much - eating was not a priority for her.  Vitamin/Herbal Supplement Use: Folic Acid, MVI, Thiamine,  Food Allergies/Intolerances:  None  GI Symptoms: None  Oral Problems: None and prior to admit, but recommend a swallow eval prior to initiating oral intakes due to change in mental status   Critical-Care Pain Observation Score:  [0-4]       Anthropometrics:  Height: 177.8 cm (5' 10\")   Weight: 87.7 kg (193 lb 5.5 oz)   BMI (Calculated): 27.74  IBW/kg (Dietitian Calculated): 68.04 kg  Percent of IBW: 140.67 %       Weight History:   Wt Readings from Last 15 Encounters:   06/29/24 87.7 kg (193 lb 5.5 oz)   06/26/24 95.7 kg (211 lb)   06/21/24 95.7 kg (211 lb)   06/04/24 95.3 kg (210 lb)   05/13/24 102 kg (224 lb 13.9 oz)   04/24/24 90.9 kg (200 lb 6.4 oz)   03/19/24 96.5 kg (212 lb 11.9 oz)   03/11/24 96.5 kg (212 lb 11.2 oz)   02/14/24 86.2 kg (190 lb)   02/08/24 87.5 kg (193 lb)   01/20/24 99.3 kg (218 lb 14.7 oz)   01/17/24 96 kg (211 lb 10.3 oz)   12/26/23 93 kg (205 lb)   12/18/23 93.4 kg (205 lb 14.6 oz)   11/30/23 105 kg (231 lb)       Weight " "Change %:  Weight History / % Weight Change: Weight history shows fluctuation probably in relation to edema and variable PO intakes. Last 6 months wt range has been 190-224 lbs per chart.  Significant Weight Loss: Yes  Interpretation of Weight Loss: >5% in 1 month    Nutrition Focused Physical Exam Findings:  defer: family at bedside - pt not responding  Subcutaneous Fat Loss:      Muscle Wasting:     Edema:  Edema Location: no documentation  Physical Findings:  Skin: Positive (nursing note BLE wounds \"doing well\")    Nutrition Significant Labs:  CBC Trend:   Results from last 7 days   Lab Units 06/29/24  0329 06/28/24  0958 06/27/24  1554   WBC AUTO x10*3/uL 5.6 4.0* 4.3*   RBC AUTO x10*6/uL 2.31* 2.49* 2.36*   HEMOGLOBIN g/dL 7.0* 7.5* 7.1*   HEMATOCRIT % 23.1* 25.3* 23.6*   MCV fL 100 102* 100   PLATELETS AUTO x10*3/uL 73* 57* 63*    , BMP Trend:   Results from last 7 days   Lab Units 06/29/24 0329 06/28/24  0958 06/27/24  1554   GLUCOSE mg/dL 194* 126* 58*   CALCIUM mg/dL 7.8* 8.4* 8.1*   SODIUM mmol/L 146* 149* 148*   POTASSIUM mmol/L 4.4 4.0 4.5   CO2 mmol/L 22 25 26   CHLORIDE mmol/L 115* 114* 113*   BUN mg/dL 41* 44* 48*   CREATININE mg/dL 2.18* 2.03* 2.22*    , BG POCT trend:   Results from last 7 days   Lab Units 06/29/24  1520 06/29/24  1211 06/29/24  0833 06/29/24  0449 06/29/24  0014   POCT GLUCOSE mg/dL 183* 191* 236* 197* 164*    , Liver Function Trend:   Results from last 7 days   Lab Units 06/29/24  0329 06/28/24  0958   ALK PHOS U/L 120 141*   AST U/L 24 36   ALT U/L 25 31   BILIRUBIN TOTAL mg/dL 0.3 0.3    , Renal Lab Trend:   Results from last 7 days   Lab Units 06/29/24  0329 06/28/24  0958 06/27/24  1554 06/27/24  1554   POTASSIUM mmol/L 4.4 4.0  --  4.5   PHOSPHORUS mg/dL 3.9  --   --   --    SODIUM mmol/L 146* 149*  --  148*   MAGNESIUM mg/dL 1.67 1.49*   < >  --    EGFR mL/min/1.73m*2 25* 27*  --  25*   BUN mg/dL 41* 44*  --  48*   CREATININE mg/dL 2.18* 2.03*  --  2.22*    < > = values in " this interval not displayed.    , TPN/PPN Labs:   Results from last 7 days   Lab Units 06/29/24  0329 06/28/24  0958 06/27/24  1554 06/27/24  1554   GLUCOSE mg/dL 194* 126*  --  58*   POTASSIUM mmol/L 4.4 4.0  --  4.5   PHOSPHORUS mg/dL 3.9  --   --   --    MAGNESIUM mg/dL 1.67 1.49*   < >  --    SODIUM mmol/L 146* 149*  --  148*   CHLORIDE mmol/L 115* 114*  --  113*   ALT U/L 25 31   < >  --    AST U/L 24 36   < >  --    ALK PHOS U/L 120 141*   < >  --    BILIRUBIN TOTAL mg/dL 0.3 0.3   < >  --     < > = values in this interval not displayed.    , Vit D:   Lab Results   Component Value Date    VITD25 34 12/10/2022    , Vit B12:   Lab Results   Component Value Date    CRYIVQHI24 1,499 (H) 03/20/2024    , Iron Panel:   Lab Results   Component Value Date    IRON 157 (H) 06/14/2024    TIBC 220 (L) 06/14/2024    FERRITIN 1,060 (H) 04/25/2024    , Folate:   Lab Results   Component Value Date    FOLATE >24.0 04/25/2024        Nutrition Specific Medications:  Scheduled medications  [Held by provider] atorvastatin, 40 mg, oral, Nightly  heparin, 5,000 Units, subcutaneous, q12h  hydrocortisone sodium succinate, 100 mg, intravenous, q8h  insulin lispro, 0-5 Units, subcutaneous, q4h  levETIRAcetam, 500 mg, intravenous, q12h  OLANZapine, 5 mg, intramuscular, Once  pantoprazole, 40 mg, intravenous, Daily before breakfast  piperacillin-tazobactam, 3.375 g, intravenous, q8h      Continuous medications  dextrose 5%, 75 mL/hr, Last Rate: 75 mL/hr (06/29/24 1900)  norepinephrine, 0.01-0.5 mcg/kg/min, Last Rate: 0.05 mcg/kg/min (06/29/24 1559)      PRN medications  PRN medications: dextrose, dextrose, glucagon, glucagon, magnesium sulfate, magnesium sulfate, [START ON 6/30/2024] OLANZapine, potassium chloride      I/O:   Last BM Date: 06/29/24; Stool Appearance: Loose (06/29/24 0800)    Dietary Orders (From admission, onward)       Start     Ordered    06/28/24 1409  NPO Diet; Effective now  Diet effective now         06/28/24 1400                      Estimated Needs:   Total Energy Estimated Needs (kCal):  (2002 kcal (MSJ x 1.1 x 1.2) or 22.8 kcal/kg)     Total Protein Estimated Needs (g):  (88-105g protein (1.0-1.2 g/kg))     Total Fluid Estimated Needs (mL):  (2000 mL (1mL/kcal) or per MD)           Nutrition Diagnosis   Malnutrition Diagnosis  Patient has Malnutrition Diagnosis: Yes  Diagnosis Status: New  Malnutrition Diagnosis: Moderate malnutrition related to chronic disease or condition  As Evidenced by: >5% wt loss x 1 month with reported intakes <75% of estimated nutritional needs.    Nutrition Diagnosis  Patient has Nutrition Diagnosis: Yes  Diagnosis Status (1): New  Nutrition Diagnosis 1: Inadequate oral intake  Related to (1): altered mental status  As Evidenced by (1): NPO status.  Additional Assessment Information (1): Case discussed with AMINAH Cervantes who reports platelets are too low to place Corpak at this time.       Nutrition Interventions/Recommendations         Nutrition Prescription:  Individualized Nutrition Prescription Provided for : Enteral Nutrition: Suggest GLUCERNA 1.5 @ goal rate of 55mL/hr with 165mL H2O flushes every 4 hours.        Nutrition Interventions:   Interventions: Enteral intake, Meals and snacks, Medical food supplement  Goal: If mental status improves, suggest SLP eval and advance diet as tolerated. Pt was tolerating a regular, diabetic diet prior to admit.  Enteral Intake: Insert enteral feeding tube  Goal: Suggest Corpak placement within the next 24 hours if mental status does not improve to meet nutritional needs.  Medical Food Supplement: Commercial beverage  Goal: Once oral diet started, supplement intakes with GLUCERNA (Vanilla) at all meals.  Additional Interventions: Pt was drinking Glucerna Shakes at previous hospitalization with all meals.    Collaboration and Referral of Nutrition Care: Collaboration by nutrition professional with other providers  Goal: AMINAH Cervantes    Nutrition Education:    Pt not appropriate.       Nutrition Monitoring and Evaluation   Food/Nutrient Related History Monitoring  Monitoring and Evaluation Plan: Enteral and parenteral nutrition intake  Enteral and Parenteral Nutrition Intake: Enteral nutrition intake  Criteria: Enteral nutrition will start within the next 24 hours if oral diet is not able to advance.    Body Composition/Growth/Weight History  Monitoring and Evaluation Plan: Weight  Weight: Measured weight  Criteria: Maintain stable weight.    Biochemical Data, Medical Tests and Procedures  Monitoring and Evaluation Plan: Glucose/endocrine profile, Electrolyte/renal panel  Electrolyte and Renal Panel: BUN, Creatinine  Criteria: Renal status to improve  Glucose/Endocrine Profile: Glucose, casual  Criteria: Glycemic control goal 70-180mg/dL    Nutrition Focused Physical Findings  Monitoring and Evaluation Plan: Skin  Criteria: Skin to continue to improve.         Time Spent (min): 60 minutes

## 2024-06-29 NOTE — CONSULTS
Endocrinology Initial Inpatient Consult Note     PATIENT NAME: Bing Holliday  MRN: [id number]  DATE: 6/29/2024    CONSULTING PHYSICIAN: Dr. Yolanda Moreno.  REASON FOR CONSULT: AI. T2DM.      History of Presenting Illness     62y F w. PMHx of:      # Systemic Lupus Erythematous c/b Cerebritis and Jaccoud's Arthropathy      # Uncontrolled T2DM      # Hx of C Difficile Infection (5/23)      # Stage IV CKD      # Paroxysmal Atrial Fibrillation      # Heart Failure with Reduced LVEF      # Osteoporosis      # CAD s/p CABG (2013)      # GERD      # COPD      # pHTN      # HTN      # HLD    Patient presented from [facility] with abnormal lab values.  Patient had outpatient labs which was elevated serum creatinine 2.2-2 where the patient has a baseline of 1.5.  Patient has been having worsening weakness reportedly at her ECF.  Becoming more difficult to lift and assist out of bed.  Patient is also been reportedly more somnolent and confused.    In the ER, the patient's CBC showed leukopenia, a macrocytic anemia, and thrombocytopenia.  Her CMP showed a sodium of 149 with an elevated creatinine to 2.03.  She did have hypoalbuminemia 4.1.  Lactate level is 1.2.  Blood cultures are pending.  Troponins are negative at 14.  Her TSH was found to be 6.46 with a free T4 of 1.03.  Urinalysis was negative for nitrite but positive for leukocyte Estrace.  She did have pyuria.  Her urine culture showed no significant growth so far.  The patient was subsequently admitted for further management.  She underwent a CT of the chest abdomen and pelvis without iodinated contrast which did show new right lower lobe nodular opacities.  There was no acute process in her abdomen and pelvis.  CT of the brain showed no acute cranial abnormality.  Plain radiograph of the chest showed cardiomegaly.    I attempted to ask patient questions but she did not answer any of them.      Review of Systems (Bold if Positive)     Unable to Obtain 2/2 Mental Status.      " Physical Examination     BP (!) 72/48   Pulse 72   Temp 37.2 °C (99 °F)   Resp 19   Ht 1.778 m (5' 10\")   Wt 87.7 kg (193 lb 5.5 oz)   LMP  (LMP Unknown)   SpO2 99%   BMI 27.74 kg/m²   General: Tremulous.  Does not speak to me.  Will not squeeze my fingers when asked.  Cachectic.  HEENT: PERRLA. EOMi. Ø Exophthalmos.  Temporal wasting.  Neck: No LAD.  Thyroid: 20g Ø Bruit  CV: Normal rate and rhythm. No murmurs or rubs auscultated.   Resp: CTA b/l. No wheezing or crackles.   Abdomen: Generalized abdominal pain.  No rebound tenderness.  Extremities: Trace pedal edema b/l.  Ulnar deviation of the bilateral fingers.  Neuro: CN II-XII Grossly Intact.  Bilateral hand tremors. Brisk Reflexes.   Psych: Inattentive.  Skin: No apparent rashes      Past Medical / Surgical / Family / Social History     Past Medical History:   Diagnosis Date    Abnormal kidney function 04/04/2023    Acute headache 03/10/2024    Acute low back pain 10/30/2023    Acute upper respiratory infection, unspecified 03/04/2020    Acute URI    Acute upper respiratory infection, unspecified 09/30/2015    URTI (acute upper respiratory infection)    Arthritis     Atypical facial pain 03/10/2024    Body mass index (BMI) 23.0-23.9, adult 10/15/2021    BMI 23.0-23.9, adult    Body mass index (BMI) 33.0-33.9, adult 03/04/2020    BMI 33.0-33.9,adult    Cardiomegaly 08/27/2013    Left ventricular hypertrophy    Chest pain 06/10/2022    Comment on above: Added by Problem List Migration; 2013-3-12; Moved to Suppressed Nov 25 2013 9:16PM; Comment on above: Added by Problem List Migration; 2013-3-12; Moved to Suppressed Nov 25 2013 9:16PM;    Chronic kidney disease, stage 3 unspecified (Multi) 07/02/2013    Chronic kidney disease, stage III (moderate)    Confusional state 03/10/2024    Contact with and (suspected) exposure to covid-19 04/04/2023    Delirium 03/10/2024    Disease of pericardium, unspecified ([facility]) 07/02/2013    Pericardial disease    " Encounter for follow-up examination after completed treatment for conditions other than malignant neoplasm 10/06/2022    Hospital discharge follow-up    Generalized contraction of visual field, right eye 01/29/2015    Generalized contraction of visual field of right eye    Hearing loss 03/10/2024    History of cataract 03/10/2024    History of thrombocytopenia 03/10/2024    Comment on above: Added by Problem List Migration; 2013-7-2;    Homonymous bilateral field defects, right side 04/29/2016    Homonymous bilateral field defects of right side    Hypertensive chronic kidney disease with stage 1 through stage 4 chronic kidney disease, or unspecified chronic kidney disease 07/02/2013    Nephrosclerosis    Laceration without foreign body, left foot, initial encounter 07/03/2018    Foot laceration, left, initial encounter    Localized edema 03/10/2024    Mass of skin 03/10/2024    Migraine with aura, not intractable, without status migrainosus 10/24/2022    Ocular migraine    Open wound 03/10/2024    Open wound of left foot 03/10/2024    Other conditions influencing health status 07/02/2013    Chronic Glomerulonephritis In Diseases Classified Elsewhere    Other conditions influencing health status 07/02/2013    Progressive Familial Myoclonic Epilepsy    Other conditions influencing health status 07/02/2013    Protein S Deficiency    Other conditions influencing health status 05/22/2015    Familial Combined Hyperlipidemia    Other conditions influencing health status 10/24/2022    IDDM (insulin dependent diabetes mellitus)    Other conditions influencing health status 03/14/2022    Diabetes mellitus, insulin dependent (IDDM), uncontrolled    Other long term (current) drug therapy 10/24/2022    Long-term use of Plaquenil    Overweight with body mass index (BMI) 25.0-29.9 03/10/2024    Pain of toe 03/10/2024    Personal history of COVID-19 04/04/2023    Personal history of diseases of the blood and blood-forming organs  and certain disorders involving the immune mechanism 07/02/2013    History of thrombocytopenia    Personal history of diseases of the skin and subcutaneous tissue 08/11/2015    History of foot ulcer    Personal history of nephrotic syndrome 07/02/2013    History of nephrotic syndrome    Personal history of other diseases of the circulatory system 08/27/2013    History of sinus tachycardia    Personal history of other diseases of the nervous system and sense organs     History of cataract    Personal history of other diseases of the respiratory system     History of bronchitis    Personal history of other infectious and parasitic diseases 07/02/2013    History of hepatitis    Personal history of other specified conditions     History of shortness of breath    Personal history of other specified conditions 08/27/2013    History of edema    Posterior epistaxis 03/10/2024    Puckering of macula, right eye 10/24/2022    ERM OD (epiretinal membrane, right eye)    Raynaud's syndrome without gangrene 07/02/2013    Raynaud's disease    Sinusitis 03/10/2024    Systemic lupus erythematosus, unspecified (Multi) 07/24/2015    SLE (systemic lupus erythematosus)    Systemic lupus erythematosus, unspecified (Multi) 07/24/2015    SLE (systemic lupus erythematosus)    Systemic lupus erythematosus, unspecified (Multi) 07/24/2015    Systemic lupus    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) 07/02/2013    Type 2 diabetes with nephropathy    Type 2 diabetes mellitus with mild nonproliferative diabetic retinopathy without macular edema, left eye (Multi) 07/27/2015    Non-proliferative diabetic retinopathy, left eye    Type 2 diabetes mellitus with mild nonproliferative diabetic retinopathy without macular edema, unspecified eye (Multi) 07/24/2015    Mild non proliferative diabetic retinopathy    Type 2 diabetes mellitus with proliferative diabetic retinopathy without macular edema, right eye (Multi) 07/27/2015     Proliferative diabetic retinopathy of right eye    Type 2 diabetes mellitus with proliferative diabetic retinopathy without macular edema, unspecified eye (Multi) 07/24/2015    Diabetic proliferative retinopathy    Unspecified acute and subacute iridocyclitis 07/24/2015    Acute iritis, right eye    Unspecified open wound, left foot, sequela 07/03/2018    Wound, open, foot, left, sequela     Past Surgical History:   Procedure Laterality Date    ANKLE SURGERY  01/29/2015    Ankle Surgery    CARDIAC ELECTROPHYSIOLOGY PROCEDURE Left 5/17/2024    Procedure: PPM IMPLANT DUAL;  Surgeon: Antonio Rodriges MD;  Location: [facility];  Service: Electrophysiology;  Laterality: Left;  Pt. needs platelets and Prednisone prior to procedure    CHOLECYSTECTOMY  01/29/2015    Cholecystectomy    CT GUIDED PERCUTANEOUS BIOPSY BONE DEEP  5/4/2021    CT GUIDED PERCUTANEOUS BIOPSY BONE DEEP 5/4/2021 [facility] CL[facility]ICAL [facility]GAC[facility]    EYE SURGERY  03/06/2015    Eye Surgery    FOOT SURGERY  01/29/2015    Foot Surgery    MR HEAD ANGIO WO IV CONTRAST  7/26/2013    MR HEAD ANGIO WO IV CONTRAST 7/26/2013 [facility] CLINICAL LEGACY    MR HEAD ANGIO WO IV CONTRAST  9/17/2021    MR HEAD ANGIO WO IV CONTRAST 9/17/2021 [facility]    MR HEAD ANGIO WO IV CONTRAST  3/25/2023    MR HEAD ANGIO WO IV CONTRAST [facility]    MR NECK ANGIO WO IV CONTRAST  7/26/2013    MR NECK ANGIO WO IV CONTRAST 7/26/2013 [facility] CLINICAL LEGACY    MR NECK ANGIO WO IV CONTRAST  9/17/2021    MR NECK ANGIO WO IV CONTRAST 9/17/2021 [facility]    MR NECK ANGIO WO IV CONTRAST  3/25/2023    MR NECK ANGIO WO IV CONTRAST [facility]    OTHER SURGICAL HISTORY  01/29/2015    Creation Of Pericardial Window    OTHER SURGICAL HISTORY  01/29/2015    Quadricepsplasty    TOTAL HIP ARTHROPLASTY  01/29/2015    Hip Replacement     Family History   Problem Relation Name Age of Onset    Other (RENAL DISEASE) Mother          END STAGE    Other (CARDIAC DISORDER) Mother      Cataracts Mother       Stroke Mother      Diabetes Mother      Kidney disease Mother      Lupus Mother      Other (CARDIAC DISORDER) Father      COPD Father      Glaucoma Father      Hypertension Father      Sleep apnea Father      Other (CARDIAC DISORDER) Sister      Depression Sister      Kidney disease Sister      Sickle cell trait Sister      Sleep apnea Daughter       Social History     Socioeconomic History    Marital status:      Spouse name: Not on file    Number of children: Not on file    Years of education: Not on file    Highest education level: Not on file   Occupational History    Not on file   Tobacco Use    Smoking status: Never     Passive exposure: Never    Smokeless tobacco: Never   Vaping Use    Vaping status: Never Used   Substance and Sexual Activity    Alcohol use: Not Currently     Comment: RARE    Drug use: Never    Sexual activity: Defer   Other Topics Concern    Not on file   Social History Narrative    Not on file     Social Determinants of Health     Financial Resource Strain: Patient Unable To Answer (6/28/2024)    Overall Financial Resource Strain (CARDIA)     Difficulty of Paying Living Expenses: Patient unable to answer   Food Insecurity: Patient Unable To Answer (6/28/2024)    Hunger Vital Sign     Worried About Running Out of Food in the Last Year: Patient unable to answer     Ran Out of Food in the Last Year: Patient unable to answer   Transportation Needs: Patient Unable To Answer (6/28/2024)    PRAPARE - Transportation     Lack of Transportation (Medical): Patient unable to answer     Lack of Transportation (Non-Medical): Patient unable to answer   Physical Activity: Inactive (6/28/2024)    Exercise Vital Sign     Days of Exercise per Week: 0 days     Minutes of Exercise per Session: 0 min   Stress: Patient Unable To Answer (6/28/2024)    [employer] - Occupational Stress Questionnaire     Feeling of Stress : Patient unable to answer   Social Connections:  Patient Unable To Answer (6/28/2024)    Social Connection and Isolation Panel [NHANES]     Frequency of Communication with Friends and Family: Patient unable to answer     Frequency of Social Gatherings with Friends and Family: Patient unable to answer     Attends Mu-ism Services: Patient unable to answer     Active Member of Clubs or Organizations: Patient unable to answer     Attends Club or Organization Meetings: Patient unable to answer     Marital Status: Patient unable to answer   Intimate Partner Violence: Patient Unable To Answer (6/28/2024)    Humiliation, Afraid, Rape, and Kick questionnaire     Fear of Current or Ex-Partner: Patient unable to answer     Emotionally Abused: Patient unable to answer     Physically Abused: Patient unable to answer     Sexually Abused: Patient unable to answer   Housing Stability: Patient Unable To Answer (6/28/2024)    Housing Stability Vital Sign     Unable to Pay for Housing in the Last Year: Patient unable to answer     Number of Places Lived in the Last Year: 1     Unstable Housing in the Last Year: Patient unable to answer   Recent Concern: Housing Stability - High Risk (6/4/2024)    Housing Stability Vital Sign     Unable to Pay for Housing in the Last Year: No     Number of Places Lived in the Last Year: 3     Unstable Housing in the Last Year: No       Medications & Allergies     MAR and PTA Meds Reviewed     Allergies   Allergen Reactions    Ace Inhibitors Shortness of breath, Swelling, Other and Angioedema     Facial droop    Hydroxychloroquine Other and Nausea/vomiting     Retinal Bleeding    Lisinopril Swelling    Sulfa (Sulfonamide Antibiotics) Hives       Data     Recent Labs and Imaging Reviewed      Assessment / Plan       # Adrenal Insufficiency  Admitted to the ICU with encephalopathy and sepsis. Given her sick status and known history of adrenal insufficiency which I have remarked on on numerous occasions in my previous note, I do think it is reasonable  you stress dose steroids. Given her encephalopathy I like to decrease her hydrocortisone to 25 mg every 8 hours.  I did attempt to review the nursing home MAR but it was not in the chart.  Perhaps we can obtain this.    #Hypernatremia  Likely secondary to dehydration.  The patient is been started on D5.  I agree with the same.    # Hypoglycemia  # Uncontrolled Type 2 Diabetes Mellitus  Reviewed notes from endocrinology during her previous admission at [facility].  The patient was actually on insulin glargine.  Patient admittedly does become hypoglycemic.  I do believe that this is in the setting of her being hypoadrenal.  For now I think sliding scale with meals and at bedtime is reasonable.  I do not feel really comfortable putting her on any scheduled insulin.  Given her multiple comorbidities and tenuous clinical status, I think it is okay for her to run a little high so as to not confound her encephalopathy.  I discussed with her RN, I am not keen on treating sugars unless they go above 250 mg/dL.    # Pancytopenia  In the setting of sepsis.  The patient is hypoalbuminemia.  Is this nutritional?  Is there a malignant process ongoing?  Query need for bone marrow biopsy.    # Abnormal TFTs  I have reviewed the patient's TSH values for the last 5 years.  She has been more time in the hospital than outside of the hospital during this time.  She has multiple TSH values which I suspect have been ordered due to encephalopathy.  She does not have any TSH values greater than 10.  On admission it was 6.46.  I do think this is secondary to nonthyroidal illness.  Her free T4 is normal which is reassuring.  I recommend no intervention on her TSH value.    # Osteoporosis  In the setting of chronic glucocorticoid use. This will be further managed as an outpatient. She probably would benefit from a bisphosphonate, these agents are best studied with steroid-induced adynamic bone disease.       Avi Sloan, DO  Endocrinology,  Diabetes, and Metabolism    Available via [employer] Messenger    Please excuse and typographical or unwanted errors within this documentation as voice recognition software was used to dictate this note.

## 2024-06-29 NOTE — PROCEDURES
Insert arterial line    Date/Time: 6/28/2024 10:48 PM    Performed by: ASIM Moreno  Authorized by: ASIM Moreno    Consent:     Consent obtained:  Emergent situation  Universal protocol:     Patient identity confirmed:  Arm band  Indications:     Indications: hemodynamic monitoring    Pre-procedure details:     Skin preparation:  Chlorhexidine    Preparation: Patient was prepped and draped in sterile fashion    Sedation:     Sedation type:  None  Anesthesia:     Anesthesia method:  Local infiltration    Local anesthetic:  Lidocaine 1% w/o epi  Procedure details:     Location:  R radial    Needle gauge:  20 G    Placement technique:  Ultrasound guided    Number of attempts:  1    Transducer: waveform confirmed    Post-procedure details:     Post-procedure:  Biopatch applied, secured with tape and sterile dressing applied    CMS:  Unchanged    Procedure completion:  Tolerated well, no immediate complications

## 2024-06-30 ENCOUNTER — APPOINTMENT (OUTPATIENT)
Dept: CARDIOLOGY | Facility: HOSPITAL | Age: 64
DRG: 871 | End: 2024-06-30
Payer: MEDICARE

## 2024-06-30 ENCOUNTER — APPOINTMENT (OUTPATIENT)
Dept: RADIOLOGY | Facility: HOSPITAL | Age: 64
DRG: 871 | End: 2024-06-30
Payer: MEDICARE

## 2024-06-30 VITALS
TEMPERATURE: 97.5 F | RESPIRATION RATE: 17 BRPM | BODY MASS INDEX: 27.68 KG/M2 | OXYGEN SATURATION: 91 % | HEART RATE: 76 BPM | DIASTOLIC BLOOD PRESSURE: 68 MMHG | HEIGHT: 70 IN | WEIGHT: 193.34 LBS | SYSTOLIC BLOOD PRESSURE: 130 MMHG

## 2024-06-30 LAB
ABO GROUP (TYPE) IN BLOOD: NORMAL
ACANTHOCYTES BLD QL SMEAR: ABNORMAL
ALBUMIN SERPL BCP-MCNC: 2.7 G/DL (ref 3.4–5)
ALP SERPL-CCNC: 109 U/L (ref 33–136)
ALT SERPL W P-5'-P-CCNC: 19 U/L (ref 7–45)
ANION GAP SERPL CALC-SCNC: 13 MMOL/L (ref 10–20)
ANTIBODY SCREEN: NORMAL
AST SERPL W P-5'-P-CCNC: 19 U/L (ref 9–39)
BACTERIA BLD CULT: NORMAL
BACTERIA BLD CULT: NORMAL
BASOPHILS # BLD MANUAL: 0 X10*3/UL (ref 0–0.1)
BASOPHILS NFR BLD MANUAL: 0 %
BILIRUB SERPL-MCNC: 0.4 MG/DL (ref 0–1.2)
BLOOD EXPIRATION DATE: NORMAL
BUN SERPL-MCNC: 37 MG/DL (ref 6–23)
CALCIUM SERPL-MCNC: 8.5 MG/DL (ref 8.6–10.3)
CARDIAC TROPONIN I PNL SERPL HS: 64 NG/L (ref 0–13)
CARDIAC TROPONIN I PNL SERPL HS: 75 NG/L (ref 0–13)
CHLORIDE SERPL-SCNC: 114 MMOL/L (ref 98–107)
CO2 SERPL-SCNC: 22 MMOL/L (ref 21–32)
CREAT SERPL-MCNC: 2.4 MG/DL (ref 0.5–1.05)
DACRYOCYTES BLD QL SMEAR: ABNORMAL
DISPENSE STATUS: NORMAL
EGFRCR SERPLBLD CKD-EPI 2021: 22 ML/MIN/1.73M*2
EOSINOPHIL # BLD MANUAL: 0 X10*3/UL (ref 0–0.7)
EOSINOPHIL NFR BLD MANUAL: 0 %
ERYTHROCYTE [DISTWIDTH] IN BLOOD BY AUTOMATED COUNT: 20.6 % (ref 11.5–14.5)
GIANT PLATELETS BLD QL SMEAR: ABNORMAL
GLUCOSE BLD MANUAL STRIP-MCNC: 162 MG/DL (ref 74–99)
GLUCOSE BLD MANUAL STRIP-MCNC: 163 MG/DL (ref 74–99)
GLUCOSE BLD MANUAL STRIP-MCNC: 182 MG/DL (ref 74–99)
GLUCOSE BLD MANUAL STRIP-MCNC: 190 MG/DL (ref 74–99)
GLUCOSE BLD MANUAL STRIP-MCNC: 210 MG/DL (ref 74–99)
GLUCOSE SERPL-MCNC: 156 MG/DL (ref 74–99)
HCT VFR BLD AUTO: 20.1 % (ref 36–46)
HCT VFR BLD AUTO: 20.5 % (ref 36–46)
HCT VFR BLD AUTO: 23.5 % (ref 36–46)
HGB BLD-MCNC: 6.3 G/DL (ref 12–16)
HGB BLD-MCNC: 6.3 G/DL (ref 12–16)
HGB BLD-MCNC: 7.3 G/DL (ref 12–16)
HGB RETIC QN: 33 PG (ref 28–38)
HYPOCHROMIA BLD QL SMEAR: ABNORMAL
IMM GRANULOCYTES # BLD AUTO: 0.1 X10*3/UL (ref 0–0.7)
IMM GRANULOCYTES NFR BLD AUTO: 1.5 % (ref 0–0.9)
IMMATURE RETIC FRACTION: 13.9 %
LYMPHOCYTES # BLD MANUAL: 0.59 X10*3/UL (ref 1.2–4.8)
LYMPHOCYTES NFR BLD MANUAL: 9 %
MAGNESIUM SERPL-MCNC: 2.1 MG/DL (ref 1.6–2.4)
MCH RBC QN AUTO: 30.1 PG (ref 26–34)
MCHC RBC AUTO-ENTMCNC: 30.7 G/DL (ref 32–36)
MCV RBC AUTO: 98 FL (ref 80–100)
MONOCYTES # BLD MANUAL: 0.13 X10*3/UL (ref 0.1–1)
MONOCYTES NFR BLD MANUAL: 2 %
NEUTROPHILS # BLD MANUAL: 5.79 X10*3/UL (ref 1.2–7.7)
NEUTS BAND # BLD MANUAL: 0.46 X10*3/UL (ref 0–0.7)
NEUTS BAND NFR BLD MANUAL: 7 %
NEUTS SEG # BLD MANUAL: 5.33 X10*3/UL (ref 1.2–7)
NEUTS SEG NFR BLD MANUAL: 82 %
NRBC BLD-RTO: 1.1 /100 WBCS (ref 0–0)
PHOSPHATE SERPL-MCNC: 3.7 MG/DL (ref 2.5–4.9)
PLATELET # BLD AUTO: 58 X10*3/UL (ref 150–450)
POTASSIUM SERPL-SCNC: 4.1 MMOL/L (ref 3.5–5.3)
PRODUCT BLOOD TYPE: 5100
PRODUCT CODE: NORMAL
PROT SERPL-MCNC: 5.3 G/DL (ref 6.4–8.2)
RBC # BLD AUTO: 2.09 X10*6/UL (ref 4–5.2)
RBC MORPH BLD: ABNORMAL
RETICS #: 0.02 X10*6/UL (ref 0.02–0.08)
RETICS/RBC NFR AUTO: 1.1 % (ref 0.5–2)
RH FACTOR (ANTIGEN D): NORMAL
SODIUM SERPL-SCNC: 145 MMOL/L (ref 136–145)
TARGETS BLD QL SMEAR: ABNORMAL
TOTAL CELLS COUNTED BLD: 100
TOXIC GRANULES BLD QL SMEAR: PRESENT
UNIT ABO: NORMAL
UNIT NUMBER: NORMAL
UNIT RH: NORMAL
UNIT VOLUME: 400
WBC # BLD AUTO: 6.5 X10*3/UL (ref 4.4–11.3)
XM INTEP: NORMAL

## 2024-06-30 PROCEDURE — C9113 INJ PANTOPRAZOLE SODIUM, VIA: HCPCS | Performed by: STUDENT IN AN ORGANIZED HEALTH CARE EDUCATION/TRAINING PROGRAM

## 2024-06-30 PROCEDURE — 93005 ELECTROCARDIOGRAM TRACING: CPT

## 2024-06-30 PROCEDURE — 83735 ASSAY OF MAGNESIUM: CPT | Performed by: EMERGENCY MEDICINE

## 2024-06-30 PROCEDURE — P9016 RBC LEUKOCYTES REDUCED: HCPCS

## 2024-06-30 PROCEDURE — 74018 RADEX ABDOMEN 1 VIEW: CPT

## 2024-06-30 PROCEDURE — 86901 BLOOD TYPING SEROLOGIC RH(D): CPT | Performed by: EMERGENCY MEDICINE

## 2024-06-30 PROCEDURE — 85027 COMPLETE CBC AUTOMATED: CPT | Performed by: EMERGENCY MEDICINE

## 2024-06-30 PROCEDURE — 84100 ASSAY OF PHOSPHORUS: CPT | Performed by: EMERGENCY MEDICINE

## 2024-06-30 PROCEDURE — 84484 ASSAY OF TROPONIN QUANT: CPT | Performed by: EMERGENCY MEDICINE

## 2024-06-30 PROCEDURE — 85045 AUTOMATED RETICULOCYTE COUNT: CPT | Performed by: EMERGENCY MEDICINE

## 2024-06-30 PROCEDURE — 2020000001 HC ICU ROOM DAILY

## 2024-06-30 PROCEDURE — 2500000002 HC RX 250 W HCPCS SELF ADMINISTERED DRUGS (ALT 637 FOR MEDICARE OP, ALT 636 FOR OP/ED): Performed by: EMERGENCY MEDICINE

## 2024-06-30 PROCEDURE — 2500000004 HC RX 250 GENERAL PHARMACY W/ HCPCS (ALT 636 FOR OP/ED)

## 2024-06-30 PROCEDURE — 2500000001 HC RX 250 WO HCPCS SELF ADMINISTERED DRUGS (ALT 637 FOR MEDICARE OP): Performed by: EMERGENCY MEDICINE

## 2024-06-30 PROCEDURE — 74018 RADEX ABDOMEN 1 VIEW: CPT | Performed by: RADIOLOGY

## 2024-06-30 PROCEDURE — 2500000004 HC RX 250 GENERAL PHARMACY W/ HCPCS (ALT 636 FOR OP/ED): Performed by: STUDENT IN AN ORGANIZED HEALTH CARE EDUCATION/TRAINING PROGRAM

## 2024-06-30 PROCEDURE — 37799 UNLISTED PX VASCULAR SURGERY: CPT | Performed by: EMERGENCY MEDICINE

## 2024-06-30 PROCEDURE — 2500000005 HC RX 250 GENERAL PHARMACY W/O HCPCS: Performed by: EMERGENCY MEDICINE

## 2024-06-30 PROCEDURE — 85007 BL SMEAR W/DIFF WBC COUNT: CPT | Performed by: EMERGENCY MEDICINE

## 2024-06-30 PROCEDURE — 99291 CRITICAL CARE FIRST HOUR: CPT

## 2024-06-30 PROCEDURE — 86920 COMPATIBILITY TEST SPIN: CPT

## 2024-06-30 PROCEDURE — 85014 HEMATOCRIT: CPT | Performed by: EMERGENCY MEDICINE

## 2024-06-30 PROCEDURE — 2500000004 HC RX 250 GENERAL PHARMACY W/ HCPCS (ALT 636 FOR OP/ED): Performed by: EMERGENCY MEDICINE

## 2024-06-30 PROCEDURE — 82947 ASSAY GLUCOSE BLOOD QUANT: CPT | Mod: 91

## 2024-06-30 PROCEDURE — 82247 BILIRUBIN TOTAL: CPT | Performed by: EMERGENCY MEDICINE

## 2024-06-30 PROCEDURE — 85014 HEMATOCRIT: CPT | Mod: 91

## 2024-06-30 PROCEDURE — 36430 TRANSFUSION BLD/BLD COMPNT: CPT

## 2024-06-30 RX ORDER — DEXTROSE, SODIUM CHLORIDE, SODIUM LACTATE, POTASSIUM CHLORIDE, AND CALCIUM CHLORIDE 5; .6; .31; .03; .02 G/100ML; G/100ML; G/100ML; G/100ML; G/100ML
75 INJECTION, SOLUTION INTRAVENOUS CONTINUOUS
Status: ACTIVE | OUTPATIENT
Start: 2024-06-30 | End: 2024-06-30

## 2024-06-30 RX ORDER — SERTRALINE HYDROCHLORIDE 25 MG/1
25 TABLET, FILM COATED ORAL DAILY
Status: DISPENSED | OUTPATIENT
Start: 2024-06-30

## 2024-06-30 RX ORDER — FOLIC ACID 1 MG/1
1 TABLET ORAL DAILY
Status: DISPENSED | OUTPATIENT
Start: 2024-06-30

## 2024-06-30 RX ORDER — LANOLIN ALCOHOL/MO/W.PET/CERES
100 CREAM (GRAM) TOPICAL DAILY
Status: DISPENSED | OUTPATIENT
Start: 2024-06-30

## 2024-06-30 RX ORDER — DIPHENHYDRAMINE HYDROCHLORIDE 50 MG/ML
25 INJECTION INTRAMUSCULAR; INTRAVENOUS ONCE
Status: DISCONTINUED | OUTPATIENT
Start: 2024-06-30 | End: 2024-06-30

## 2024-06-30 RX ORDER — ACETAMINOPHEN 500 MG
5 TABLET ORAL NIGHTLY
Status: DISPENSED | OUTPATIENT
Start: 2024-06-30

## 2024-06-30 RX ORDER — PANTOPRAZOLE SODIUM 40 MG/1
40 TABLET, DELAYED RELEASE ORAL DAILY
Status: ACTIVE | OUTPATIENT
Start: 2024-07-01

## 2024-06-30 RX ORDER — DEXMEDETOMIDINE HYDROCHLORIDE 4 UG/ML
.1-1.5 INJECTION, SOLUTION INTRAVENOUS CONTINUOUS
Status: DISPENSED | OUTPATIENT
Start: 2024-06-30

## 2024-06-30 RX ORDER — DIPHENHYDRAMINE HYDROCHLORIDE 50 MG/ML
50 INJECTION INTRAMUSCULAR; INTRAVENOUS ONCE
Status: COMPLETED | OUTPATIENT
Start: 2024-06-30 | End: 2024-06-30

## 2024-06-30 RX ORDER — LEVETIRACETAM 500 MG/1
500 TABLET ORAL EVERY 12 HOURS
Status: DISPENSED | OUTPATIENT
Start: 2024-06-30

## 2024-06-30 RX ORDER — OLANZAPINE 10 MG/2ML
5 INJECTION, POWDER, FOR SOLUTION INTRAMUSCULAR EVERY 6 HOURS PRN
Status: DISPENSED | OUTPATIENT
Start: 2024-06-30

## 2024-06-30 RX ORDER — MULTIVIT-MIN/IRON FUM/FOLIC AC 7.5 MG-4
1 TABLET ORAL DAILY
Status: DISPENSED | OUTPATIENT
Start: 2024-06-30

## 2024-06-30 ASSESSMENT — PAIN - FUNCTIONAL ASSESSMENT

## 2024-06-30 NOTE — PROGRESS NOTES
Critical Care Daily Progress        Subjective   Patient is a 63 y.o. female admitted on 6/28/2024  9:42 AM with the following indication(s) for ICU care Septic shock 2/2 PNA in setting of adrenal insufficiency requiring vasopressor support.     Overnight Events: Patient was yelling and becoming further agitated overnight consistent with delirium, zyprexa ordered was insufficient for controlling delirium. Subsequently placed on precedex which controlled agitation. Hbg was also low, a unit of blood was ordered after consent was placed with family over phone. Pressor has been weaned off since 0600 6/30 and bear hugger has been off since 0400.     Complaints: has none..     Scheduled Medications:   [Held by provider] atorvastatin, 40 mg, oral, Nightly  heparin, 5,000 Units, subcutaneous, q12h  hydrocortisone sodium succinate, 100 mg, intravenous, q8h  insulin lispro, 0-5 Units, subcutaneous, q4h  levETIRAcetam, 500 mg, intravenous, q12h  pantoprazole, 40 mg, intravenous, Daily before breakfast  piperacillin-tazobactam, 3.375 g, intravenous, q8h         Continuous Medications:   dexmedeTOMIDine, 0.1-1.5 mcg/kg/hr, Last Rate: 0.1 mcg/kg/hr (06/30/24 0800)  dextrose 5 % and lactated Ringer's, 75 mL/hr, Last Rate: 75 mL/hr (06/30/24 0800)  norepinephrine, 0.01-0.5 mcg/kg/min, Last Rate: Stopped (06/30/24 0615)         PRN Medications:   PRN medications: dextrose, dextrose, glucagon, glucagon, magnesium sulfate, magnesium sulfate, OLANZapine, oxygen, potassium chloride    Objective   Vitals:  Temp  Min: 36 °C (96.8 °F)  Max: 37.2 °C (99 °F)  Pulse  Min: 58  Max: 98  BP  Min: 111/55  Max: 111/55  Resp  Min: 11  Max: 58  SpO2  Min: 91 %  Max: 100 %    Physical Exam:   Physical Exam   Physical Exam  Constitutional: A&Ox0, appears frail, cachectic, ill appearing  Head and Face: Atraumatic, normocephalic   Eyes: Normal external exam, EOMI  ENT: Normal external inspection of ears and nose. Oropharynx normal.  Cardiovascular: RRR,  S1/S2, no murmurs, rubs, or gallops, radial pulses +2  Pulmonary: poor inspiratory efforts, CTAB, no respiratory distress, no wheezing, rales or rhonchi, on RA  Abdomen: +BS, soft, non-tender, nondistended, no guarding rigidity or rebound tenderness, no masses noted  MSK: Pitting edema of legs b/l improving, No joint swelling, normal movements of all extremities.   Neuro: Limited neurological exam with patient requiring sedation, however no focal deficits seen, patient awakens to verbal and tactile stimuli, does appear to track with eyes, no nystagmus noted or tremoring  Skin- lower extremity wounds b/l without erythema or drainage or pus      Assessment/Plan   Patient is a 63 y.o. 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      Neuro: Metabolic Encephalopathy 2/2 adrenal insufficiency vs sepsis, delirium   -History: Multiple admissions for AMS found to be in adrenal insufficiency, lupus cerebritis   -Home meds: Zoloft, Keppra 500 mg twice daily, meclizine, paliperidone  -GCS: E 4 V 2 M 4  -Pain: None  -Sedation: Precedex (wean as tolerated, will bridge with zyprexa in hopes to start paliperidone home dose)  -[facility] ICU  - CT head negative   -Continue Keppra 500mg IV BID  -Zyprexa 5mg q6 prn for agitation      Cardiac: Septic Shock refractory to IVF 2/2 PNA vs UTI, elevated troponin with ST depressions likely 2/2 demand ischemia in setting of acute anemia  - History: afib (eliquis), 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 Reassuring that troponins are down trending with transfusion      Pulmonary: AHRF 2/2 CAP, resolved  -History: COPD (no PFTs)  -Home meds: Albuterol  Continuous pulse oximetry   O2 PRN to maintain SpO2 > 94%, wean as tolerated  -Imaging: CTA New right lower lobe nodular opacities, likely infectious/inflammatory.   -Desaturation to 84% on RA, placed on 6 L however has been weaned to RA  -Has poor inspiratory effort  -Duonebs q6     Gastrointestinal: No active concerns  -History: GERD  -Home meds: Pantoprazole  - Diet: NPO  - Supplementation: Planning to place NG tube and start feeds + medications   - Prophylaxis:  protonix  - Bowel regimen: None  - Last BM:    -Although plt<100, patient is chronically thrombocytopenic and benefit of nutrition and including more anti-psychotic medications to fight delirium outweighs risk of bleeding     Renal: JED on CKD3, likely pre-renal in setting of decreased PO intake +/- ischemia ATN. Acute on chronic hypernatremia,   - Daily RFP,Mg,Phos  - History: CKD3 (bl Cr 1.5-1.9)  - Home meds: Lasix 40  - Montano with strict I/O  Net IO Since Admission: 2,775.3 mL [06/30/24 0836]  Results from last 72 hours   Lab Units 06/30/24  0439 06/29/24  2038 06/29/24  0329 06/28/24  0958   BUN mg/dL 37* 38* 41* 44*   CREATININE mg/dL 2.40* 2.38* 2.18* 2.03*       - Avoid nephrotoxic agents  - Fluids: Continue D5LR infusion --> urine output has improved from 25cc/hr to 75cc/hr, continuing fluids to help with volume expansion and will monitor for diuresis phase of possible ATN  - Electrolytes: Replete per protocol, goal K>4 Phos >3 Mg  >2    Endocrine/Rheumatology: Adrenal Insufficiency in setting of infection, hypothermia is improving  -History: recurrent adrenal insufficiency (hydrocortisone), 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  -Home meds: Lantus 10 units in a.m., sliding scale, Cortef 20 mg in a.m./20 mg p.m., flu cortisone 0.1 mg every other day  - sliding scale: Mild, hold insulin if BS <250  - Solu cortef 100mg q8  - Endocrinology consulted  - Hypoglycemia protocol  - Holding mycophenolate in setting of infection (does this need to be held indefinitely in setting of recurrent infections/hospitalizations?)   Results from last 7 days   Lab Units 24  0739 24  0439 24  0037 24  2038 24  2004 24  1520 24  1211 24  0833   POCT GLUCOSE mg/dL 163*  --  162*  --  150* 183* 191* 236*   GLUCOSE mg/dL  --  156*  --  171*  --   --   --   --       -BG < 180 goal    Hematology: Acute on chronic anemia of chronic disease, chronic pancytopenia   - Daily CBC, coags  -History: Pancytopenia   -Home meds: Eliquis 2.5 mg twice daily  -s/p 1 unit pRBC, will do repeat H&H post transfusion and transfuse <7 if necessary   Results from last 7 days   Lab Units 24  0637 24  0439 24  0329 24  0958 24  1554   WBC AUTO x10*3/uL  --  6.5 5.6 4.0* 4.3*   HEMOGLOBIN g/dL 6.3* 6.3* 7.0* 7.5* 7.1*   HEMATOCRIT % 20.1* 20.5* 23.1* 25.3* 23.6*   PLATELETS AUTO x10*3/uL  --  58* 73* 57* 63*     - DVT Prophylaxis: Heparin subcutaneous 5000U BID     Infectious Disease: Sepsis 2/2 PNA vs UTI   -History: -History: Recently treated with rocephin for lower leg wound infection at [place]   -MRSA negative  Temp (24hrs), Av.4 °C (97.6 °F), Min:36 °C (96.8 °F), Max:37.2 °C (99 °F)     -Abx: Vancomycin (-), Cefepime (-), Zosyn (-)  -Cultures:               Blood Culture : No growth to date              Urine Culture : No growth to date    Musculoskeletal:   - PT to see    - OT to see     LDA:  CVC 06/28/24 Triple lumen Right Internal jugular (Active)   Placement Date/Time: 06/28/24 1300   Placed by External Staff?: (c) Other (Comment)  Hand Hygiene Performed Prior to CVC Insertion: Yes  Site Prep: Chlorhexidine   Site Prep Agent has Completely Dried Before Insertion: Yes  All 5 Sterile Barriers Used...   Number of days: 0       Arterial Line 06/28/24 Right Radial (Active)   Placement Date/Time: 06/28/24 (c) 2248   Size: 20 G  Orientation: Right  Location: Radial  Site Prep: Chlorhexidine   Local Anesthetic: Injectable  Insertion attempts: 1  Securement Method: Transparent dressing;Taped  Patient Tolerance: Tolerated well   Number of days: 0       Urethral Catheter Temperature probe 16 Fr. (Active)   Placement Date/Time: 06/28/24 1148   Hand Hygiene Completed: Yes  Catheter Type: Temperature probe  Tube Size (Fr.): 16 Fr.  Catheter Balloon Size: 10 mL  Urine Returned: Yes   Number of days: 0       External Urinary Catheter Female (Active)   Placement Date/Time: 06/07/24 0005   Placed by: Jaswant WING  Hand Hygiene Completed: Yes  External Catheter Type: Female   Number of days: 22         CODE STATUS: DNR and No Intubation    Disposition: Patient to remain in ICU today while weaning off pressors and requiring precedex    ABCDEF Checklist  Analgesia: Spontaneous awakening trial to be pursued if clinically appropriate. RASS goal reviewed  Breathing: Spontaneous breathing trial to be pursued if clinically appropriate. Mechanical power of assisted ventilation reviewed  Choice of analgesia/sedation: Analgesic and sedative agents adjusted per clinical context.   Delirium assessed by CAM, will avoid exacerbating factors  Early mobility and exercise: Physical and occupational therapy engaged  Family: Plan of care, overall trajectory of patient shared with family. Questions elicited and answered as appropriate.     Critical care time: 45 mins      Case discussed with attending ,   Tomas Carreon Hostoffer, DO  Internal Medicine, PGY-1

## 2024-06-30 NOTE — CARE PLAN
Problem: Nutrition  Goal: Oral intake greater than 50%  Outcome: Not Progressing  Goal: Oral intake greater 75%  Outcome: Not Progressing  Goal: Consume prescribed supplement  Outcome: Not Progressing  Goal: Adequate PO fluid intake  Outcome: Not Progressing  Goal: Nutrition support goals are met within 48 hrs  Outcome: Not Progressing  Goal: Nutrition support is meeting 75% of nutrient needs  Outcome: Not Progressing  Goal: Tube feed tolerance  Outcome: Not Progressing  Goal: BG  mg/dL  Outcome: Not Progressing  Goal: Lab values WNL  Outcome: Not Progressing  Goal: Electrolytes WNL  Outcome: Not Progressing  Goal: Promote healing  Outcome: Not Progressing  Goal: Maintain stable weight  Outcome: Not Progressing  Goal: Reduce weight from edema/fluid  Outcome: Not Progressing  Goal: Gradual weight gain  Outcome: Not Progressing  Goal: Improve ostomy output  Outcome: Progressing     Problem: Diabetes  Goal: Achieve decreasing blood glucose levels by end of shift  Outcome: Progressing  Goal: Increase stability of blood glucose readings by end of shift  Outcome: Progressing  Goal: Decrease in ketones present in urine by end of shift  Outcome: Progressing  Goal: Maintain electrolyte levels within acceptable range throughout shift  Outcome: Progressing  Goal: No changes in neurological exam by end of shift  Outcome: Progressing  Goal: Learn about and adhere to nutrition recommendations by end of shift  Outcome: Progressing  Goal: Vital signs within normal range for age by end of shift  Outcome: Progressing  Goal: Receive DSME education by end of shift  Outcome: Progressing     Problem: Skin  Goal: Decreased wound size/increased tissue granulation at next dressing change  Outcome: Progressing  Flowsheets (Taken 6/30/2024 0259)  Decreased wound size/increased tissue granulation at next dressing change:   Protective dressings over bony prominences   Promote sleep for wound healing  Goal: Participates in  plan/prevention/treatment measures  Outcome: Progressing  Flowsheets (Taken 6/30/2024 0259)  Participates in plan/prevention/treatment measures: Elevate heels     Problem: Discharge Planning  Goal: Discharge to home or other facility with appropriate resources  Outcome: Progressing     Problem: Chronic Conditions and Co-morbidities  Goal: Patient's chronic conditions and co-morbidity symptoms are monitored and maintained or improved  Outcome: Progressing     Problem: Indwelling Catheter Maintenance  Goal: I will have no complications from indwelling catheter  Outcome: Progressing   The patient's goals for the shift include      The clinical goals for the shift include Patient to remain H/D stable for shift 6/29-6/30 0700        Problem: Nutrition  Goal: Oral intake greater than 50%  Outcome: Not Progressing  Goal: Oral intake greater 75%  Outcome: Not Progressing  Goal: Consume prescribed supplement  Outcome: Not Progressing  Goal: Adequate PO fluid intake  Outcome: Not Progressing  Goal: Nutrition support goals are met within 48 hrs  Outcome: Not Progressing  Goal: Nutrition support is meeting 75% of nutrient needs  Outcome: Not Progressing  Goal: Tube feed tolerance  Outcome: Not Progressing  Goal: BG  mg/dL  Outcome: Not Progressing  Goal: Lab values WNL  Outcome: Not Progressing  Goal: Electrolytes WNL  Outcome: Not Progressing  Goal: Promote healing  Outcome: Not Progressing  Goal: Maintain stable weight  Outcome: Not Progressing  Goal: Reduce weight from edema/fluid  Outcome: Not Progressing  Goal: Gradual weight gain  Outcome: Not Progressing

## 2024-06-30 NOTE — NURSING NOTE
"0000- Patient agitated and screaming out \"stop it Sunita\". Patient unable to be re oriented.     0040- Patient continuity screaming out \"Stop it and Stop it Sunita\". Patient unable to be reoriented, or soothed. ICU Dr. Bennett notified and ok per doctor for patient to get PRN for agitation. See MAR.     0200- Patient continues to be agitated. Patient now attempting to pull at central line in neck. Patient unable to be soothed. Dr. Bennett notified, see MAR for medications given.  "

## 2024-07-01 ENCOUNTER — DOCUMENTATION (OUTPATIENT)
Dept: PRIMARY CARE | Facility: CLINIC | Age: 64
End: 2024-07-01
Payer: MEDICARE

## 2024-07-01 DIAGNOSIS — E11.42 DM TYPE 2 WITH DIABETIC PERIPHERAL NEUROPATHY (MULTI): ICD-10-CM

## 2024-07-01 DIAGNOSIS — N18.31 STAGE 3A CHRONIC KIDNEY DISEASE (MULTI): ICD-10-CM

## 2024-07-01 DIAGNOSIS — M32.9 LUPUS (MULTI): ICD-10-CM

## 2024-07-01 LAB
ALBUMIN SERPL BCP-MCNC: 2.6 G/DL (ref 3.4–5)
ALP SERPL-CCNC: 127 U/L (ref 33–136)
ALT SERPL W P-5'-P-CCNC: 21 U/L (ref 7–45)
ANION GAP SERPL CALC-SCNC: 12 MMOL/L (ref 10–20)
AST SERPL W P-5'-P-CCNC: 20 U/L (ref 9–39)
ATRIAL RATE: 500 BPM
BASOPHILS # BLD AUTO: 0.01 X10*3/UL (ref 0–0.1)
BASOPHILS NFR BLD AUTO: 0.1 %
BILIRUB SERPL-MCNC: 0.4 MG/DL (ref 0–1.2)
BUN SERPL-MCNC: 37 MG/DL (ref 6–23)
CALCIUM SERPL-MCNC: 8.3 MG/DL (ref 8.6–10.3)
CHLORIDE SERPL-SCNC: 114 MMOL/L (ref 98–107)
CO2 SERPL-SCNC: 22 MMOL/L (ref 21–32)
CREAT SERPL-MCNC: 2.28 MG/DL (ref 0.5–1.05)
EGFRCR SERPLBLD CKD-EPI 2021: 24 ML/MIN/1.73M*2
EOSINOPHIL # BLD AUTO: 0.01 X10*3/UL (ref 0–0.7)
EOSINOPHIL NFR BLD AUTO: 0.1 %
ERYTHROCYTE [DISTWIDTH] IN BLOOD BY AUTOMATED COUNT: 20 % (ref 11.5–14.5)
GLUCOSE BLD MANUAL STRIP-MCNC: 195 MG/DL (ref 74–99)
GLUCOSE BLD MANUAL STRIP-MCNC: 218 MG/DL (ref 74–99)
GLUCOSE BLD MANUAL STRIP-MCNC: 249 MG/DL (ref 74–99)
GLUCOSE BLD MANUAL STRIP-MCNC: 281 MG/DL (ref 74–99)
GLUCOSE BLD MANUAL STRIP-MCNC: 299 MG/DL (ref 74–99)
GLUCOSE BLD MANUAL STRIP-MCNC: 309 MG/DL (ref 74–99)
GLUCOSE SERPL-MCNC: 316 MG/DL (ref 74–99)
HCT VFR BLD AUTO: 24.3 % (ref 36–46)
HGB BLD-MCNC: 7.7 G/DL (ref 12–16)
IMM GRANULOCYTES # BLD AUTO: 0.4 X10*3/UL (ref 0–0.7)
IMM GRANULOCYTES NFR BLD AUTO: 5.9 % (ref 0–0.9)
LYMPHOCYTES # BLD AUTO: 1.01 X10*3/UL (ref 1.2–4.8)
LYMPHOCYTES NFR BLD AUTO: 15 %
MAGNESIUM SERPL-MCNC: 1.99 MG/DL (ref 1.6–2.4)
MCH RBC QN AUTO: 30.7 PG (ref 26–34)
MCHC RBC AUTO-ENTMCNC: 31.7 G/DL (ref 32–36)
MCV RBC AUTO: 97 FL (ref 80–100)
MONOCYTES # BLD AUTO: 0.35 X10*3/UL (ref 0.1–1)
MONOCYTES NFR BLD AUTO: 5.2 %
NEUTROPHILS # BLD AUTO: 4.96 X10*3/UL (ref 1.2–7.7)
NEUTROPHILS NFR BLD AUTO: 73.7 %
NRBC BLD-RTO: 1.6 /100 WBCS (ref 0–0)
PHOSPHATE SERPL-MCNC: 4.2 MG/DL (ref 2.5–4.9)
PLATELET # BLD AUTO: 54 X10*3/UL (ref 150–450)
POTASSIUM SERPL-SCNC: 4.1 MMOL/L (ref 3.5–5.3)
PROT SERPL-MCNC: 4.9 G/DL (ref 6.4–8.2)
Q ONSET: 215 MS
QRS COUNT: 9 BEATS
QRS DURATION: 102 MS
QT INTERVAL: 430 MS
QTC CALCULATION(BAZETT): 430 MS
QTC FREDERICIA: 430 MS
R AXIS: 0 DEGREES
RBC # BLD AUTO: 2.51 X10*6/UL (ref 4–5.2)
SODIUM SERPL-SCNC: 144 MMOL/L (ref 136–145)
T AXIS: -68 DEGREES
T OFFSET: 430 MS
VENTRICULAR RATE: 60 BPM
WBC # BLD AUTO: 6.7 X10*3/UL (ref 4.4–11.3)

## 2024-07-01 PROCEDURE — 2500000004 HC RX 250 GENERAL PHARMACY W/ HCPCS (ALT 636 FOR OP/ED)

## 2024-07-01 PROCEDURE — 2500000002 HC RX 250 W HCPCS SELF ADMINISTERED DRUGS (ALT 637 FOR MEDICARE OP, ALT 636 FOR OP/ED)

## 2024-07-01 PROCEDURE — C9113 INJ PANTOPRAZOLE SODIUM, VIA: HCPCS | Performed by: EMERGENCY MEDICINE

## 2024-07-01 PROCEDURE — 2500000001 HC RX 250 WO HCPCS SELF ADMINISTERED DRUGS (ALT 637 FOR MEDICARE OP): Performed by: EMERGENCY MEDICINE

## 2024-07-01 PROCEDURE — 2500000005 HC RX 250 GENERAL PHARMACY W/O HCPCS: Performed by: EMERGENCY MEDICINE

## 2024-07-01 PROCEDURE — 2500000002 HC RX 250 W HCPCS SELF ADMINISTERED DRUGS (ALT 637 FOR MEDICARE OP, ALT 636 FOR OP/ED): Performed by: EMERGENCY MEDICINE

## 2024-07-01 PROCEDURE — 2500000001 HC RX 250 WO HCPCS SELF ADMINISTERED DRUGS (ALT 637 FOR MEDICARE OP): Performed by: NURSE PRACTITIONER

## 2024-07-01 PROCEDURE — 2500000004 HC RX 250 GENERAL PHARMACY W/ HCPCS (ALT 636 FOR OP/ED): Performed by: NURSE PRACTITIONER

## 2024-07-01 PROCEDURE — 37799 UNLISTED PX VASCULAR SURGERY: CPT | Performed by: EMERGENCY MEDICINE

## 2024-07-01 PROCEDURE — 85025 COMPLETE CBC W/AUTO DIFF WBC: CPT | Performed by: EMERGENCY MEDICINE

## 2024-07-01 PROCEDURE — 76937 US GUIDE VASCULAR ACCESS: CPT

## 2024-07-01 PROCEDURE — 84100 ASSAY OF PHOSPHORUS: CPT | Performed by: EMERGENCY MEDICINE

## 2024-07-01 PROCEDURE — 84075 ASSAY ALKALINE PHOSPHATASE: CPT | Performed by: EMERGENCY MEDICINE

## 2024-07-01 PROCEDURE — 83735 ASSAY OF MAGNESIUM: CPT | Performed by: EMERGENCY MEDICINE

## 2024-07-01 PROCEDURE — 2500000001 HC RX 250 WO HCPCS SELF ADMINISTERED DRUGS (ALT 637 FOR MEDICARE OP)

## 2024-07-01 PROCEDURE — 2500000004 HC RX 250 GENERAL PHARMACY W/ HCPCS (ALT 636 FOR OP/ED): Performed by: EMERGENCY MEDICINE

## 2024-07-01 PROCEDURE — 2020000001 HC ICU ROOM DAILY

## 2024-07-01 PROCEDURE — 82947 ASSAY GLUCOSE BLOOD QUANT: CPT

## 2024-07-01 RX ORDER — AMOXICILLIN AND CLAVULANATE POTASSIUM 875; 125 MG/1; MG/1
1 TABLET, FILM COATED ORAL EVERY 12 HOURS SCHEDULED
Status: DISCONTINUED | OUTPATIENT
Start: 2024-07-01 | End: 2024-07-02

## 2024-07-01 RX ORDER — HEPARIN SODIUM 5000 [USP'U]/ML
5000 INJECTION, SOLUTION INTRAVENOUS; SUBCUTANEOUS EVERY 8 HOURS
Status: DISCONTINUED | OUTPATIENT
Start: 2024-07-01 | End: 2024-07-04

## 2024-07-01 RX ORDER — METOPROLOL TARTRATE 25 MG/1
12.5 TABLET, FILM COATED ORAL 2 TIMES DAILY
Status: DISCONTINUED | OUTPATIENT
Start: 2024-07-01 | End: 2024-07-02

## 2024-07-01 RX ORDER — PANTOPRAZOLE SODIUM 40 MG/1
40 TABLET, DELAYED RELEASE ORAL
Status: DISCONTINUED | OUTPATIENT
Start: 2024-07-01 | End: 2024-07-09 | Stop reason: HOSPADM

## 2024-07-01 RX ORDER — LANOLIN ALCOHOL/MO/W.PET/CERES
100 CREAM (GRAM) TOPICAL DAILY
Status: DISCONTINUED | OUTPATIENT
Start: 2024-07-01 | End: 2024-07-04

## 2024-07-01 RX ORDER — ACETAMINOPHEN 500 MG
5 TABLET ORAL NIGHTLY
Status: DISCONTINUED | OUTPATIENT
Start: 2024-07-01 | End: 2024-07-04

## 2024-07-01 RX ORDER — OXYMETAZOLINE HCL 0.05 %
2 SPRAY, NON-AEROSOL (ML) NASAL ONCE
Status: COMPLETED | OUTPATIENT
Start: 2024-07-01 | End: 2024-07-01

## 2024-07-01 RX ORDER — PANTOPRAZOLE SODIUM 40 MG/10ML
40 INJECTION, POWDER, LYOPHILIZED, FOR SOLUTION INTRAVENOUS
Status: DISCONTINUED | OUTPATIENT
Start: 2024-07-01 | End: 2024-07-04

## 2024-07-01 RX ORDER — FOLIC ACID 1 MG/1
1 TABLET ORAL DAILY
Status: DISCONTINUED | OUTPATIENT
Start: 2024-07-01 | End: 2024-07-04

## 2024-07-01 RX ORDER — LEVETIRACETAM 500 MG/1
500 TABLET ORAL EVERY 12 HOURS
Status: DISCONTINUED | OUTPATIENT
Start: 2024-07-01 | End: 2024-07-04

## 2024-07-01 RX ORDER — ATORVASTATIN CALCIUM 40 MG/1
40 TABLET, FILM COATED ORAL NIGHTLY
Status: DISCONTINUED | OUTPATIENT
Start: 2024-07-01 | End: 2024-07-04

## 2024-07-01 RX ORDER — MULTIVIT-MIN/IRON FUM/FOLIC AC 7.5 MG-4
1 TABLET ORAL DAILY
Status: DISCONTINUED | OUTPATIENT
Start: 2024-07-01 | End: 2024-07-04

## 2024-07-01 RX ORDER — SERTRALINE HYDROCHLORIDE 50 MG/1
25 TABLET, FILM COATED ORAL DAILY
Status: DISCONTINUED | OUTPATIENT
Start: 2024-07-01 | End: 2024-07-04

## 2024-07-01 RX ORDER — RISPERIDONE 0.5 MG/1
1 TABLET ORAL 2 TIMES DAILY
Status: DISCONTINUED | OUTPATIENT
Start: 2024-07-01 | End: 2024-07-02

## 2024-07-01 ASSESSMENT — COGNITIVE AND FUNCTIONAL STATUS - GENERAL
WALKING IN HOSPITAL ROOM: TOTAL
CLIMB 3 TO 5 STEPS WITH RAILING: TOTAL
TOILETING: TOTAL
DRESSING REGULAR UPPER BODY CLOTHING: TOTAL
MOVING TO AND FROM BED TO CHAIR: TOTAL
HELP NEEDED FOR BATHING: TOTAL
DAILY ACTIVITIY SCORE: 6
TURNING FROM BACK TO SIDE WHILE IN FLAT BAD: TOTAL
EATING MEALS: TOTAL
STANDING UP FROM CHAIR USING ARMS: TOTAL
DRESSING REGULAR LOWER BODY CLOTHING: TOTAL
MOVING FROM LYING ON BACK TO SITTING ON SIDE OF FLAT BED WITH BEDRAILS: TOTAL
MOBILITY SCORE: 6
PERSONAL GROOMING: TOTAL

## 2024-07-01 ASSESSMENT — PAIN SCALES - WONG BAKER: WONGBAKER_NUMERICALRESPONSE: NO HURT

## 2024-07-01 ASSESSMENT — PAIN - FUNCTIONAL ASSESSMENT
PAIN_FUNCTIONAL_ASSESSMENT: CPOT (CRITICAL CARE PAIN OBSERVATION TOOL)
PAIN_FUNCTIONAL_ASSESSMENT: 0-10
PAIN_FUNCTIONAL_ASSESSMENT: CPOT (CRITICAL CARE PAIN OBSERVATION TOOL)
PAIN_FUNCTIONAL_ASSESSMENT: CPOT (CRITICAL CARE PAIN OBSERVATION TOOL)
PAIN_FUNCTIONAL_ASSESSMENT: 0-10

## 2024-07-01 ASSESSMENT — PAIN SCALES - GENERAL
PAINLEVEL_OUTOF10: 0 - NO PAIN
PAINLEVEL_OUTOF10: 0 - NO PAIN

## 2024-07-01 NOTE — PROGRESS NOTES
Occupational Therapy                 Therapy Communication Note    Patient Name: Bing Holliday  MRN: [id number]  Today's Date: 7/1/2024     Discipline: Occupational Therapy    Missed Visit Reason:  OT orders received.  Chart reviewed.  Patient not appropriate for therapy eval this date.  Will follow up at a later time.    Missed Time: Attempt

## 2024-07-01 NOTE — CONSULTS
Reason For Consult  Goals of care    History Of Present Illness  Bing Holliday is a 63 y.o. female presenting admitted on 6/28/2024   with chief complaint of AMS and abnormal labs admitted to ICU for Metabolic Encephalopathy 2/2 multifactorial sepsis w/ adrenal insufficieny.      Past Medical History  She has a past medical history of Abnormal kidney function (04/04/2023), Acute headache (03/10/2024), Acute low back pain (10/30/2023), Acute upper respiratory infection, unspecified (03/04/2020), Acute upper respiratory infection, unspecified (09/30/2015), Arthritis, Atypical facial pain (03/10/2024), Body mass index (BMI) 23.0-23.9, adult (10/15/2021), Body mass index (BMI) 33.0-33.9, adult (03/04/2020), Cardiomegaly (08/27/2013), Chest pain (06/10/2022), Chronic kidney disease, stage 3 unspecified (Multi) (07/02/2013), Confusional state (03/10/2024), Contact with and (suspected) exposure to covid-19 (04/04/2023), Delirium (03/10/2024), Disease of pericardium, unspecified ([facility]) (07/02/2013), Encounter for follow-up examination after completed treatment for conditions other than malignant neoplasm (10/06/2022), Generalized contraction of visual field, right eye (01/29/2015), Hearing loss (03/10/2024), History of cataract (03/10/2024), History of thrombocytopenia (03/10/2024), Homonymous bilateral field defects, right side (04/29/2016), Hypertensive chronic kidney disease with stage 1 through stage 4 chronic kidney disease, or unspecified chronic kidney disease (07/02/2013), Laceration without foreign body, left foot, initial encounter (07/03/2018), Localized edema (03/10/2024), Mass of skin (03/10/2024), Migraine with aura, not intractable, without status migrainosus (10/24/2022), Open wound (03/10/2024), Open wound of left foot (03/10/2024), Other conditions influencing health status (07/02/2013), Other conditions influencing health status (07/02/2013), Other conditions influencing health status (07/02/2013),  Other conditions influencing health status (05/22/2015), Other conditions influencing health status (10/24/2022), Other conditions influencing health status (03/14/2022), Other long term (current) drug therapy (10/24/2022), Overweight with body mass index (BMI) 25.0-29.9 (03/10/2024), Pain of toe (03/10/2024), Personal history of COVID-19 (04/04/2023), Personal history of diseases of the blood and blood-forming organs and certain disorders involving the immune mechanism (07/02/2013), Personal history of diseases of the skin and subcutaneous tissue (08/11/2015), Personal history of nephrotic syndrome (07/02/2013), Personal history of other diseases of the circulatory system (08/27/2013), Personal history of other diseases of the nervous system and sense organs, Personal history of other diseases of the respiratory system, Personal history of other infectious and parasitic diseases (07/02/2013), Personal history of other specified conditions, Personal history of other specified conditions (08/27/2013), Posterior epistaxis (03/10/2024), Puckering of macula, right eye (10/24/2022), Raynaud's syndrome without gangrene (07/02/2013), Sinusitis (03/10/2024), Systemic lupus erythematosus, unspecified (Multi) (07/24/2015), Systemic lupus erythematosus, unspecified (Multi) (07/24/2015), Systemic lupus erythematosus, unspecified (Multi) (07/24/2015), 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) (07/02/2013), Type 2 diabetes mellitus with mild nonproliferative diabetic retinopathy without macular edema, left eye (Multi) (07/27/2015), Type 2 diabetes mellitus with mild nonproliferative diabetic retinopathy without macular edema, unspecified eye (Multi) (07/24/2015), Type 2 diabetes mellitus with proliferative diabetic retinopathy without macular edema, right eye (Multi) (07/27/2015), Type 2 diabetes mellitus with proliferative diabetic retinopathy without macular edema, unspecified eye (Multi) (07/24/2015),  Unspecified acute and subacute iridocyclitis (07/24/2015), and Unspecified open wound, left foot, sequela (07/03/2018).        Assessment/Plan     Palliative care consulted for goals of care. Pt is familiar to the palliative team from previous admissions earlier this year. Plan to discuss goals of care with son López Lang.    Spoke with DARELL Dawn, pts son over the phone. He states that pt has been doing well despite admissions to [facility]. She was recently at [facility] and he had her transferred to [facility] most recently. López has started the medicaid application with the [facility] and is working on spending down funds for the pt to qualify. López states that he suspects it will take the pt a while to recover back to baseline as this is her usual hospital course. López is grateful for the palliative support. He remembers this writer from multiple previous admissions and is glad the pt is admitted to [facility].     Will continue to follow for supportive care.   Will follow    Rounds 4:00 pm  Rounded on patient and completed physical assessment. No unmet needs identified. PC RN spoke with KUSHAL and per communication above will continue to follow throughout this hospital stay. Please call PC if patient condition deteriorates.     Molly Chavez MSN, BABATUNDE Aviles, AMINAH

## 2024-07-01 NOTE — CONSULTS
Wound Care Consult     Visit Date: 7/1/2024      Patient Name: Bing Holliday         MRN: [id number]           YOB: 1960     Reason for Consult: Wounds BLLE.        Wound History: Present on admission.     Pertinent Labs:   Albumin   Date Value Ref Range Status   07/01/2024 2.6 (L) 3.4 - 5.0 g/dL Final   04/29/2021 3.1 (L) 3.4 - 5.0 g/dL Final     Albumin, CSF   Date Value Ref Range Status   01/18/2024 25 0 - 35 mg/dL Final     Albumin Index   Date Value Ref Range Status   01/18/2024 10.7 (H) 0.0 - 9.0 ratio Final     Albumin, Serum   Date Value Ref Range Status   01/18/2024 2337 (L) 3500 - 5200 mg/dL Final       Wound Assessment:  Wound 04/24/24 Pressure Injury Buttocks Right (Active)   Site Assessment Unable to assess 07/01/24 0400   Dressing Foam 07/01/24 0400   Dressing Changed Changed 06/29/24 1100   Dressing Status Clean 06/29/24 1600       Wound 05/11/24 Skin Tear Leg Dorsal;Left;Proximal;Upper (Active)   Site Assessment Unable to assess 07/01/24 0400   Drainage Amount None 06/29/24 1600   Dressing Foam 07/01/24 0400   Dressing Changed Changed 06/28/24 1300   Dressing Status Clean 06/29/24 1600       Wound 06/05/24 Other (comment) Pretibial Distal;Left (Active)   Site Assessment Unable to assess 07/01/24 0400   Drainage Amount None 06/29/24 1600   Dressing Xeroform;Kerlix/rolled gauze 07/01/24 0400   Dressing Changed New 06/29/24 1100   Dressing Status Clean 06/29/24 1600       Wound 06/05/24 Other (comment) Pretibial Right (Active)   Site Assessment Clean;Epithelialization;Fragile 06/29/24 1100   Batsheva-Wound Assessment Unable to assess 07/01/24 0400   Drainage Amount None 06/29/24 1100   Dressing Kerlix/rolled gauze;Xeroform 07/01/24 0400   Dressing Changed New 06/29/24 1100   Dressing Status Clean;Dry 06/29/24 1100       Wound 06/28/24 Skin Tear Leg Right;Upper;Ventral (Active)   Site Assessment Unable to assess 07/01/24 0400   Drainage Amount None 06/29/24 1600   Dressing Foam 07/01/24  0400   Dressing Changed New 06/29/24 1100   Dressing Status Clean;Dry 06/29/24 1600       Wound 06/28/24 Skin Tear Leg Left;Upper;Anterior (Active)   Site Assessment Unable to assess 07/01/24 0400   Drainage Amount None 06/29/24 1600   Dressing Foam 07/01/24 0400   Dressing Changed New 06/29/24 1100   Dressing Status Clean;Dry 06/29/24 1600       Wound Team Summary Assessment: All dressings removed. Except for a small area rt leg( 3.0 x 1.0 x 0.1 cm, all others areas healed. Skin dry. Xeroform and Mepilex applied to opened area. Lotion applied to scar tissue, then covered with Telfa and Kerlix to protect from heel boots.     Wound Team Plan: Treatment as above every 2 days. Will follow as necessary.     Aretha Wise RN  7/1/2024  3:25 PM

## 2024-07-01 NOTE — PROGRESS NOTES
07/01/24 1007   Discharge Planning   Living Arrangements Spouse/significant other   Support Systems Spouse/significant other   Type of Residence Nursing home/residential care   Home or Post Acute Services Post acute facilities (Rehab/SNF/etc)   Type of Post Acute Facility Services Skilled nursing   Patient expects to be discharged to: [facility]   Does the patient need discharge transport arranged? Yes   RoundTrip coordination needed? Yes     Medical team at bedside. Admitted for septic shock, altered mental status. Pt arrived from [facility]. Requested [facility] send return referral.     [address] [facility] is able to accept pt on return. New precert will be needed.

## 2024-07-01 NOTE — PROGRESS NOTES
"    Endocrinology Inpatient Consult Progress Note     PATIENT NAME: Bing Holliday  MRN: [id number]  DATE: 7/1/2024    CONSULTING PHYSICIAN: Dr. Zavaleta  REASON FOR CONSULT: AI. Abnormal TFTs.      Interval Events     Remains in the ICU.   Unable to obtain RoS 2/2 mental status.   Intermittent delirium requiring antipsychotics.  Enteral feeding started.      Physical Examination     /68   Pulse 68   Temp 36.4 °C (97.5 °F)   Resp 26   Ht 1.778 m (5' 10\")   Wt 88.1 kg (194 lb 3.6 oz)   LMP  (LMP Unknown)   SpO2 96%   BMI 27.87 kg/m²   Inattentive.   Not speaking to me.   Alert.   + Cor Roberto Carlos.  RRR  Non labored resp  Nil pedal edema.      Medications     Reviewed MAR       Data     Recent Labs and Imaging Reviewed      Assessment / Plan        # Adrenal Insufficiency  Patient remains in the intensive care unit.  She has a longstanding history of likely secondary adrenal insufficiency.  I would like to keep her on intravenous hydrocortisone 25 mg every 8 hours.  I think this is the minimal amount of dose we can use to try to avoid encephalopathy.  Once her clinical status improves I will put her back on prednisone.     #Hypernatremia  Improved.  Status post D5W.     # Hypoglycemia  # Uncontrolled Type 2 Diabetes Mellitus  This patient has a history of becoming hypoglycemic.  Given her encephalopathy and sick status in the ICU, I am okay with her sugars running in the 200s. She has been started on enteral feeding.  This has been discussed with the nursing staff and a RN communication order has been placed.     # Pancytopenia  In the setting of sepsis.  The patient is hypoalbuminemia.  Is this nutritional?  Is there a malignant process ongoing?  Query need for bone marrow biopsy.     # Abnormal TFTs  As per my initial consult note.  No intervention necessary at this time.     # Osteoporosis  In the setting of chronic glucocorticoid use. This will be further managed as an outpatient. She probably would benefit " from a bisphosphonate, these agents are best studied with steroid-induced adynamic bone disease.    Discussed with RN and ICU resident at the bedside.         Avi Sloan, DO  Endocrinology, Diabetes, and Metabolism    Available via EP[employer] Messenger    Please excuse any typographical or unwanted errors within this documentation as voice recognition software was used to dictate this note.

## 2024-07-01 NOTE — ED PROCEDURE NOTE
Procedure  Critical Care    Performed by: Peter Benson DO  Authorized by: Peter Benson DO    Critical care provider statement:     Critical care time (minutes):  75    Critical care was necessary to treat or prevent imminent or life-threatening deterioration of the following conditions:  Shock, sepsis, CNS failure or compromise and endocrine crisis    Critical care was time spent personally by me on the following activities:  Blood draw for specimens, evaluation of patient's response to treatment, examination of patient, ordering and performing treatments and interventions, ordering and review of laboratory studies, ordering and review of radiographic studies, pulse oximetry, re-evaluation of patient's condition and vascular access procedures    Care discussed with: admitting provider                 Peter Benson DO  07/01/24 0798

## 2024-07-01 NOTE — PROGRESS NOTES
Chart review complete.     Associated Chronic Conditions:  Stage 3a chronic kidney disease (Multi)  DM type 2 with diabetic peripheral neuropathy (Multi)  Lupus (Multi)     Patient currently inpatient at [facility]. ED note states due to abnormal labs - JED. Says patient is noncommunicative at baseline...? Further nursing notes do mention patient speaking, although delirious.     6/28 - CODE STATUS CHANGED : DNR/DNI    On discharge plan is to discharge to [facility]. Will continue to follow.

## 2024-07-01 NOTE — PROGRESS NOTES
Nutrition Follow Up:     Reason for Assessment: Provider consult order, Tube feeding recommendations    Patient is a 63 y.o. female presenting with AMS and abnormal labs admitted to ICU for metabolic encephalopathy secondary to multifactorial sepsis with adrenal insufficiency. Code status changed yesterday from full code to DNR/DNI. Family at bedside this afternoon agreeing that enteral feeding is the best option at this time for nutrition.    Follow up 7/01: Pt weaned off pressors and required Precedex yesterday, but now this has been discontinued as well. She is more awake and yelling out this afternoon. Corpak has been placed and bridled with enteral feeding running: Osmolite 1.5 started.     Past Medical History:   Diagnosis Date    Abnormal kidney function 04/04/2023    Acute headache 03/10/2024    Acute low back pain 10/30/2023    Acute upper respiratory infection, unspecified 03/04/2020    Acute URI    Acute upper respiratory infection, unspecified 09/30/2015    URTI (acute upper respiratory infection)    Arthritis     Atypical facial pain 03/10/2024    Body mass index (BMI) 23.0-23.9, adult 10/15/2021    BMI 23.0-23.9, adult    Body mass index (BMI) 33.0-33.9, adult 03/04/2020    BMI 33.0-33.9,adult    Cardiomegaly 08/27/2013    Left ventricular hypertrophy    Chest pain 06/10/2022    Comment on above: Added by Problem List Migration; 2013-3-12; Moved to Suppressed Nov 25 2013 9:16PM; Comment on above: Added by Problem List Migration; 2013-3-12; Moved to Suppressed Nov 25 2013 9:16PM;    Chronic kidney disease, stage 3 unspecified (Multi) 07/02/2013    Chronic kidney disease, stage III (moderate)    Confusional state 03/10/2024    Contact with and (suspected) exposure to covid-19 04/04/2023    Delirium 03/10/2024    Disease of pericardium, unspecified ([facility]) 07/02/2013    Pericardial disease    Encounter for follow-up examination after completed treatment for conditions other than malignant neoplasm  10/06/2022    Hospital discharge follow-up    Generalized contraction of visual field, right eye 01/29/2015    Generalized contraction of visual field of right eye    Hearing loss 03/10/2024    History of cataract 03/10/2024    History of thrombocytopenia 03/10/2024    Comment on above: Added by Problem List Migration; 2013-7-2;    Homonymous bilateral field defects, right side 04/29/2016    Homonymous bilateral field defects of right side    Hypertensive chronic kidney disease with stage 1 through stage 4 chronic kidney disease, or unspecified chronic kidney disease 07/02/2013    Nephrosclerosis    Laceration without foreign body, left foot, initial encounter 07/03/2018    Foot laceration, left, initial encounter    Localized edema 03/10/2024    Mass of skin 03/10/2024    Migraine with aura, not intractable, without status migrainosus 10/24/2022    Ocular migraine    Open wound 03/10/2024    Open wound of left foot 03/10/2024    Other conditions influencing health status 07/02/2013    Chronic Glomerulonephritis In Diseases Classified Elsewhere    Other conditions influencing health status 07/02/2013    Progressive Familial Myoclonic Epilepsy    Other conditions influencing health status 07/02/2013    Protein S Deficiency    Other conditions influencing health status 05/22/2015    Familial Combined Hyperlipidemia    Other conditions influencing health status 10/24/2022    IDDM (insulin dependent diabetes mellitus)    Other conditions influencing health status 03/14/2022    Diabetes mellitus, insulin dependent (IDDM), uncontrolled    Other long term (current) drug therapy 10/24/2022    Long-term use of Plaquenil    Overweight with body mass index (BMI) 25.0-29.9 03/10/2024    Pain of toe 03/10/2024    Personal history of COVID-19 04/04/2023    Personal history of diseases of the blood and blood-forming organs and certain disorders involving the immune mechanism 07/02/2013    History of thrombocytopenia    Personal  history of diseases of the skin and subcutaneous tissue 08/11/2015    History of foot ulcer    Personal history of nephrotic syndrome 07/02/2013    History of nephrotic syndrome    Personal history of other diseases of the circulatory system 08/27/2013    History of sinus tachycardia    Personal history of other diseases of the nervous system and sense organs     History of cataract    Personal history of other diseases of the respiratory system     History of bronchitis    Personal history of other infectious and parasitic diseases 07/02/2013    History of hepatitis    Personal history of other specified conditions     History of shortness of breath    Personal history of other specified conditions 08/27/2013    History of edema    Posterior epistaxis 03/10/2024    Puckering of macula, right eye 10/24/2022    ERM OD (epiretinal membrane, right eye)    Raynaud's syndrome without gangrene 07/02/2013    Raynaud's disease    Sinusitis 03/10/2024    Systemic lupus erythematosus, unspecified (Multi) 07/24/2015    SLE (systemic lupus erythematosus)    Systemic lupus erythematosus, unspecified (Multi) 07/24/2015    SLE (systemic lupus erythematosus)    Systemic lupus erythematosus, unspecified (Multi) 07/24/2015    Systemic lupus    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) 07/02/2013    Type 2 diabetes with nephropathy    Type 2 diabetes mellitus with mild nonproliferative diabetic retinopathy without macular edema, left eye (Multi) 07/27/2015    Non-proliferative diabetic retinopathy, left eye    Type 2 diabetes mellitus with mild nonproliferative diabetic retinopathy without macular edema, unspecified eye (Multi) 07/24/2015    Mild non proliferative diabetic retinopathy    Type 2 diabetes mellitus with proliferative diabetic retinopathy without macular edema, right eye (Multi) 07/27/2015    Proliferative diabetic retinopathy of right eye    Type 2 diabetes mellitus with proliferative diabetic  "retinopathy without macular edema, unspecified eye (Multi) 07/24/2015    Diabetic proliferative retinopathy    Unspecified acute and subacute iridocyclitis 07/24/2015    Acute iritis, right eye    Unspecified open wound, left foot, sequela 07/03/2018    Wound, open, foot, left, sequela         Nutrition History:  Energy Intake:  (Enteral feedings started today.)  Food and Nutrient History: Pt has been hospitalized every month (5 times) since March 2024. She has been in a nursing home since April and recently moved to a new nursing home 2 weeks ago after her most recent hospitalization in [place]. Call placed to [facility] and nurse explained that pt was a 1-person assist when she admitted 6/17, but last Monday (6/24) she noted that pt was suddenly a 2-person assist and seemed to be rapidly declining in her mental status as well. Labs were drawn and found to be abnormal so she was sent to the hospital (again). Nurse reports she never ate very much - eating was not a priority for her.  Vitamin/Herbal Supplement Use: Folic Acid, MVI, Thiamine,  Food Allergies/Intolerances:  None  GI Symptoms: None  Oral Problems: None and prior to admit, but recommend a swallow eval prior to initiating oral intakes due to change in mental status   Critical-Care Pain Observation Score:  [0]       Anthropometrics:  Height: 177.8 cm (5' 10\")   Weight: 88.1 kg (194 lb 3.6 oz)   BMI (Calculated): 27.87  IBW/kg (Dietitian Calculated): 68.04 kg  Percent of IBW: 140.67 %       Weight History:   Wt Readings from Last 15 Encounters:   07/01/24 88.1 kg (194 lb 3.6 oz)   06/26/24 95.7 kg (211 lb)   06/21/24 95.7 kg (211 lb)   06/04/24 95.3 kg (210 lb)   05/13/24 102 kg (224 lb 13.9 oz)   04/24/24 90.9 kg (200 lb 6.4 oz)   03/19/24 96.5 kg (212 lb 11.9 oz)   03/11/24 96.5 kg (212 lb 11.2 oz)   02/14/24 86.2 kg (190 lb)   02/08/24 87.5 kg (193 lb)   01/20/24 99.3 kg (218 lb 14.7 oz)   01/17/24 96 kg (211 lb 10.3 oz)   12/26/23 93 kg " (205 lb)   12/18/23 93.4 kg (205 lb 14.6 oz)   11/30/23 105 kg (231 lb)       Weight Change %:  Weight History / % Weight Change: Weight history shows fluctuation probably in relation to edema and variable PO intakes. Last 6 months wt range has been 190-224 lbs per chart.  Significant Weight Loss: Yes  Interpretation of Weight Loss: >5% in 1 month    Nutrition Focused Physical Exam Findings:  defer: family at bedside - pt not responding  Subcutaneous Fat Loss:      Muscle Wasting:     Edema:     Physical Findings:       Nutrition Significant Labs:  CBC Trend:   Results from last 7 days   Lab Units 07/01/24  0357 06/30/24  0637 06/30/24  0439 06/29/24  0329 06/28/24  0958   WBC AUTO x10*3/uL 6.7  --  6.5 5.6 4.0*   RBC AUTO x10*6/uL 2.51*  --  2.09* 2.31* 2.49*   HEMOGLOBIN g/dL 7.7*   < > 6.3* 7.0* 7.5*   HEMATOCRIT % 24.3*   < > 20.5* 23.1* 25.3*   MCV fL 97  --  98 100 102*   PLATELETS AUTO x10*3/uL 54*  --  58* 73* 57*    < > = values in this interval not displayed.    , BMP Trend:   Results from last 7 days   Lab Units 07/01/24  0357 06/30/24  0439 06/29/24  2038 06/29/24  0329   GLUCOSE mg/dL 316* 156* 171* 194*   CALCIUM mg/dL 8.3* 8.5* 8.4* 7.8*   SODIUM mmol/L 144 145 145 146*   POTASSIUM mmol/L 4.1 4.1 4.1 4.4   CO2 mmol/L 22 22 23 22   CHLORIDE mmol/L 114* 114* 114* 115*   BUN mg/dL 37* 37* 38* 41*   CREATININE mg/dL 2.28* 2.40* 2.38* 2.18*    , BG POCT trend:   Results from last 7 days   Lab Units 07/01/24  1554 07/01/24  1113 07/01/24  0756 07/01/24  0354 07/01/24  0027   POCT GLUCOSE mg/dL 281* 309* 249* 299* 195*    , Liver Function Trend:   Results from last 7 days   Lab Units 07/01/24  0357 06/30/24  0439 06/29/24  0329 06/28/24  0958   ALK PHOS U/L 127 109 120 141*   AST U/L 20 19 24 36   ALT U/L 21 19 25 31   BILIRUBIN TOTAL mg/dL 0.4 0.4 0.3 0.3    , Renal Lab Trend:   Results from last 7 days   Lab Units 07/01/24  0357 06/30/24  0439 06/29/24 2038 06/29/24  0329   POTASSIUM mmol/L 4.1 4.1 4.1  4.4   PHOSPHORUS mg/dL 4.2 3.7 3.8 3.9   SODIUM mmol/L 144 145 145 146*   MAGNESIUM mg/dL 1.99 2.10  --  1.67   EGFR mL/min/1.73m*2 24* 22* 23* 25*   BUN mg/dL 37* 37* 38* 41*   CREATININE mg/dL 2.28* 2.40* 2.38* 2.18*    , TPN/PPN Labs:   Results from last 7 days   Lab Units 07/01/24  0357 06/30/24  0439 06/29/24 2038 06/29/24  0329   GLUCOSE mg/dL 316* 156* 171* 194*   POTASSIUM mmol/L 4.1 4.1 4.1 4.4   PHOSPHORUS mg/dL 4.2 3.7 3.8 3.9   MAGNESIUM mg/dL 1.99 2.10  --  1.67   SODIUM mmol/L 144 145 145 146*   CHLORIDE mmol/L 114* 114* 114* 115*   ALT U/L 21 19  --  25   AST U/L 20 19  --  24   ALK PHOS U/L 127 109  --  120   BILIRUBIN TOTAL mg/dL 0.4 0.4  --  0.3    , Vit D:   Lab Results   Component Value Date    VITD25 34 12/10/2022    , Vit B12:   Lab Results   Component Value Date    EHDVNDPF07 1,499 (H) 03/20/2024    , Iron Panel:   Lab Results   Component Value Date    IRON 157 (H) 06/14/2024    TIBC 220 (L) 06/14/2024    FERRITIN 1,060 (H) 04/25/2024    , Folate:   Lab Results   Component Value Date    FOLATE >24.0 04/25/2024        Nutrition Specific Medications:  Scheduled medications  amoxicillin-pot clavulanate, 1 tablet, oral, q12h GERALD  atorvastatin, 40 mg, nasogastric tube, Nightly  folic acid, 1 mg, nasogastric tube, Daily  heparin, 5,000 Units, subcutaneous, q8h  hydrocortisone sodium succinate, 25 mg, intravenous, q8h  insulin lispro, 0-5 Units, subcutaneous, q4h  levETIRAcetam, 500 mg, nasogastric tube, q12h  melatonin, 5 mg, nasogastric tube, Nightly  metoprolol tartrate, 12.5 mg, oral, BID  multivitamin with minerals, 1 tablet, nasogastric tube, Daily  pantoprazole, 40 mg, oral, Daily before breakfast   Or  pantoprazole, 40 mg, intravenous, Daily before breakfast  risperiDONE, 1 mg, oral, BID  sertraline, 25 mg, nasogastric tube, Daily  thiamine, 100 mg, nasogastric tube, Daily      Continuous medications       PRN medications  PRN medications: dextrose, dextrose, glucagon, glucagon, magnesium  sulfate, magnesium sulfate, OLANZapine, oxygen, potassium chloride      I/O:   Last BM Date: 07/01/24; Stool Appearance: Soft (07/01/24 1200)    Dietary Orders (From admission, onward)       Start     Ordered    07/01/24 0522  Enteral feeding with NPO NG (nasogastric tube); 55 (Advance by 10ml/hr every 4-6hours with goal feed of 40ml/hr); 165; Water; Every 4 hours  Diet effective now        Question Answer Comment   Tube feeding formula: Glucerna 1.5    Feeding route: NG (nasogastric tube)    Tube feeding cyclic rate (mL/hr): 55 Advance by 10ml/hr every 4-6hours with goal feed of 40ml/hr   Tube feeding flush (mL): 165    Flush type: Water    Flush frequency: Every 4 hours        07/01/24 0522                     Estimated Needs:   Total Energy Estimated Needs (kCal):  (2002 kcal (MSJ x 1.1 x 1.2) or 22.8 kcal/kg)     Total Protein Estimated Needs (g):  (88-105g protein (1.0-1.2 g/kg))     Total Fluid Estimated Needs (mL):  (2000 mL (1mL/kcal) or per MD)           Nutrition Diagnosis   Malnutrition Diagnosis  Patient has Malnutrition Diagnosis: Yes  Diagnosis Status: New  Malnutrition Diagnosis: Moderate malnutrition related to chronic disease or condition  As Evidenced by: >5% wt loss x 1 month with reported intakes <75% of estimated nutritional needs.    Nutrition Diagnosis  Patient has Nutrition Diagnosis: Yes  Diagnosis Status (1): New  Nutrition Diagnosis 1: Inadequate oral intake  Related to (1): altered mental status  As Evidenced by (1): NPO status.  Additional Assessment Information (1): Case discussed with AMINAH Cervantes who reports platelets are too low to place Corpak at this time.       Nutrition Interventions/Recommendations         Nutrition Prescription:  Individualized Nutrition Prescription Provided for : Enteral Nutrition: GLUCERNA 1.5 @ goal rate of 55mL/hr with 165mL H2O flushes every 4 hours.        Nutrition Interventions:   Interventions: Enteral intake, Meals and snacks, Medical food  supplement  Goal: If mental status improves, suggest SLP eval and advance diet as tolerated. Pt was tolerating a regular, diabetic diet prior to admit.  Enteral Intake: Modify composition of enteral nutrition  Goal: Change from OSMOLITE 1.5 to GLUCERNA 1.5  Medical Food Supplement: Commercial beverage  Goal: Once oral diet started, supplement intakes with GLUCERNA (Vanilla) at all meals.  Additional Interventions: Pt was drinking Glucerna Shakes at previous hospitalization with all meals.         Nutrition Education:   Pt not appropriate.       Nutrition Monitoring and Evaluation   Food/Nutrient Related History Monitoring  Monitoring and Evaluation Plan: Enteral and parenteral nutrition intake  Enteral and Parenteral Nutrition Intake: Enteral nutrition intake  Criteria: Enteral nutrition will be at goal rate within the next 24 hours.    Body Composition/Growth/Weight History  Monitoring and Evaluation Plan: Weight  Weight: Measured weight  Criteria: Maintain stable weight.    Biochemical Data, Medical Tests and Procedures  Monitoring and Evaluation Plan: Glucose/endocrine profile, Electrolyte/renal panel  Electrolyte and Renal Panel: BUN, Creatinine  Criteria: Renal status to improve  Glucose/Endocrine Profile: Glucose, casual  Criteria: Glycemic control goal 70-180mg/dL    Nutrition Focused Physical Findings  Monitoring and Evaluation Plan: Skin  Criteria: Skin to continue to improve.         Time Spent (min): 30 minutes

## 2024-07-01 NOTE — PROGRESS NOTES
"Bing Holliday is a 63 y.o. female on day 3 of admission presenting with Pancytopenia (Multi).    Subjective   No acute events overnight.  Has been off pressors for greater than 24 hours.  Precedex infusion discontinued this morning and patient beginning to awaken    Objective     Physical Exam  Constitutional:       Comments: Wakens to voice and attempts to follow commands   HENT:      Head: Normocephalic and atraumatic.      Nose:      Comments: Small amount of blood from L nares  Eyes:      Comments: Tracks with eyes   Cardiovascular:      Rate and Rhythm: Normal rate. Rhythm irregular.      Pulses: Normal pulses.      Heart sounds: Normal heart sounds.   Pulmonary:      Effort: Pulmonary effort is normal.      Breath sounds: Rhonchi present.   Abdominal:      General: Abdomen is flat. Bowel sounds are normal.      Palpations: Abdomen is soft.   Skin:     General: Skin is warm and dry.   Neurological:      Comments: Generalized weakness.  Follows complex commands.  Tracks. Shakes head yes/no         Last Recorded Vitals  Blood pressure 130/68, pulse 72, temperature 36 °C (96.8 °F), temperature source Bladder, resp. rate 21, height 1.778 m (5' 10\"), weight 88.1 kg (194 lb 3.6 oz), SpO2 97%.  Intake/Output last 3 Shifts:  I/O last 3 completed shifts:  In: 3069.6 (34.8 mL/kg) [I.V.:1764.7 (20 mL/kg); Blood:369.9; NG/GT:685; IV Piggyback:250]  Out: 1668 (18.9 mL/kg) [Urine:1667 (0.5 mL/kg/hr); Stool:1]  Weight: 88.1 kg     Relevant Results  Scheduled medications  amoxicillin-pot clavulanate, 1 tablet, oral, q12h GERALD  atorvastatin, 40 mg, nasogastric tube, Nightly  folic acid, 1 mg, nasogastric tube, Daily  heparin, 5,000 Units, subcutaneous, q12h  hydrocortisone sodium succinate, 100 mg, intravenous, q8h  insulin lispro, 0-5 Units, subcutaneous, q4h  levETIRAcetam, 500 mg, nasogastric tube, q12h  melatonin, 5 mg, nasogastric tube, Nightly  multivitamin with minerals, 1 tablet, nasogastric tube, Daily  pantoprazole, " 40 mg, oral, Daily before breakfast   Or  pantoprazole, 40 mg, intravenous, Daily before breakfast  sertraline, 25 mg, nasogastric tube, Daily  thiamine, 100 mg, nasogastric tube, Daily      Continuous medications  dexmedeTOMIDine, 0.1-1.5 mcg/kg/hr, Last Rate: Stopped (07/01/24 1000)  norepinephrine, 0.01-0.5 mcg/kg/min, Last Rate: Stopped (06/30/24 0615)      PRN medications  PRN medications: dextrose, dextrose, glucagon, glucagon, magnesium sulfate, magnesium sulfate, OLANZapine, oxygen, potassium chloride    Results for orders placed or performed during the hospital encounter of 06/28/24 (from the past 24 hour(s))   POCT GLUCOSE   Result Value Ref Range    POCT Glucose 210 (H) 74 - 99 mg/dL   POCT GLUCOSE   Result Value Ref Range    POCT Glucose 195 (H) 74 - 99 mg/dL   POCT GLUCOSE   Result Value Ref Range    POCT Glucose 299 (H) 74 - 99 mg/dL   Phosphorus   Result Value Ref Range    Phosphorus 4.2 2.5 - 4.9 mg/dL   Magnesium   Result Value Ref Range    Magnesium 1.99 1.60 - 2.40 mg/dL   Comprehensive Metabolic Panel   Result Value Ref Range    Glucose 316 (H) 74 - 99 mg/dL    Sodium 144 136 - 145 mmol/L    Potassium 4.1 3.5 - 5.3 mmol/L    Chloride 114 (H) 98 - 107 mmol/L    Bicarbonate 22 21 - 32 mmol/L    Anion Gap 12 10 - 20 mmol/L    Urea Nitrogen 37 (H) 6 - 23 mg/dL    Creatinine 2.28 (H) 0.50 - 1.05 mg/dL    eGFR 24 (L) >60 mL/min/1.73m*2    Calcium 8.3 (L) 8.6 - 10.3 mg/dL    Albumin 2.6 (L) 3.4 - 5.0 g/dL    Alkaline Phosphatase 127 33 - 136 U/L    Total Protein 4.9 (L) 6.4 - 8.2 g/dL    AST 20 9 - 39 U/L    Bilirubin, Total 0.4 0.0 - 1.2 mg/dL    ALT 21 7 - 45 U/L   CBC and Auto Differential   Result Value Ref Range    WBC 6.7 4.4 - 11.3 x10*3/uL    nRBC 1.6 (H) 0.0 - 0.0 /100 WBCs    RBC 2.51 (L) 4.00 - 5.20 x10*6/uL    Hemoglobin 7.7 (L) 12.0 - 16.0 g/dL    Hematocrit 24.3 (L) 36.0 - 46.0 %    MCV 97 80 - 100 fL    MCH 30.7 26.0 - 34.0 pg    MCHC 31.7 (L) 32.0 - 36.0 g/dL    RDW 20.0 (H) 11.5 - 14.5  %    Platelets 54 (L) 150 - 450 x10*3/uL    Neutrophils % 73.7 40.0 - 80.0 %    Immature Granulocytes %, Automated 5.9 (H) 0.0 - 0.9 %    Lymphocytes % 15.0 13.0 - 44.0 %    Monocytes % 5.2 2.0 - 10.0 %    Eosinophils % 0.1 0.0 - 6.0 %    Basophils % 0.1 0.0 - 2.0 %    Neutrophils Absolute 4.96 1.20 - 7.70 x10*3/uL    Immature Granulocytes Absolute, Automated 0.40 0.00 - 0.70 x10*3/uL    Lymphocytes Absolute 1.01 (L) 1.20 - 4.80 x10*3/uL    Monocytes Absolute 0.35 0.10 - 1.00 x10*3/uL    Eosinophils Absolute 0.01 0.00 - 0.70 x10*3/uL    Basophils Absolute 0.01 0.00 - 0.10 x10*3/uL   POCT GLUCOSE   Result Value Ref Range    POCT Glucose 249 (H) 74 - 99 mg/dL   POCT GLUCOSE   Result Value Ref Range    POCT Glucose 309 (H) 74 - 99 mg/dL             This patient has a central line   Reason for the central line remaining today? Line unnecessary, will be removed today    This patient has a urinary catheter   Reason for the urinary catheter remaining today? Urine catheter unnecessary, will be removed today          Malnutrition Diagnosis Status: New  Malnutrition Diagnosis: Moderate malnutrition related to chronic disease or condition  As Evidenced by: >5% wt loss x 1 month with reported intakes <75% of estimated nutritional needs.  I agree with the dietitian's malnutrition diagnosis.      Assessment/Plan   Principal Problem:    Pancytopenia (Multi)    Patient is a 63 y.o. 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      Neuro: Metabolic Encephalopathy 2/2 adrenal insufficiency vs sepsis-mental status improving with Precedex drip off  -History: Multiple  admissions for AMS found to be in adrenal insufficiency, lupus cerebritis   -Home meds: Zoloft, Keppra 500 mg twice daily, meclizine, paliperidone  -Sedation: Precedex off.  Unable to start paliperidone is unable to crush.  Alternate medication Risperdal .  Start paliperidone when awake enough to swallow   -[facility] ICU  - CT head negative   -Continue Keppra 500mg IV BID  -Zyprexa 5mg q6 prn for agitation      Cardiac: Septic Shock refractory to IVF 2/2 PNA vs UTI, elevated troponin with ST depressions likely 2/2 demand ischemia in setting of acute anemia  - History: afib (eliquis), bradycardia 2/2 sinus node dysfunction s/p pacemaker (5/17/2024), CAD s/p CABG 2013, HFmREF (EF 40-45% 5/2024)  - Goal MAP > 65  - Home meds: Atorvastatin, resume metoprolol at half home dose  - Last echo: 40-45% 5/2024  - Pressors: Levophed (off since 0600 6/30)  -Discontinue triple-lumen catheter  -Troponins down trending    Pulmonary: AHRF 2/2 CAP, resolved  -History: COPD (no PFTs)  -Home meds: Albuterol  Continuous pulse oximetry   O2 PRN to maintain SpO2 > 94%, wean as tolerated  -Imaging: CTA New right lower lobe nodular opacities, likely infectious/inflammatory.  Follow-up 3 to 6 months recommended. DC zosyn and start augmentin  -Duonebs q6     Gastrointestinal: No active concerns  -History: GERD  -Home meds: Pantoprazole  - Diet: NPO  -Continue enteral feeds via Corpak  - Prophylaxis:  protonix  - Bowel regimen: None      Renal: JED on CKD3, likely pre-renal in setting of decreased PO intake +/- ischemia ATN. Acute on chronic hypernatremia,   - Daily RFP,Mg,Phos  - History: CKD3 (bl Cr 1.5-1.9)  - Home meds: Lasix 40  - Montano with strict I/O  - Avoid nephrotoxic agents  -Hypernatremia resolved  - Electrolytes: Replete per protocol, goal K>4 Phos >3 Mg >2     Endocrine/Rheumatology: Adrenal Insufficiency in setting of infection  -History: recurrent adrenal insufficiency (hydrocortisone), SLE cerebritis and Jaccoud arthropathy on  florinef  -Home meds: Lantus 10 units in a.m., sliding scale, Cortef 20 mg in a.m./20 mg p.m., flu cortisone 0.1 mg every other day  - sliding scale: Mild, hold insulin if BS <250  - Endocrinology consulted  -Solucortef decreased to 25mg q8h  - Hypoglycemia protocol. Goal glucose 200s  - Holding mycophenolate in setting of infection (does this need to be held indefinitely in setting of recurrent infections/hospitalizations?)        Hematology: Acute on chronic anemia of chronic disease, chronic pancytopenia   - Daily CBC, coags  -History: Pancytopenia   -Home meds: Eliquis 2.5 mg twice daily on hold. If H/H stable tomorrow, will resume  -s/p 1 unit pRBC yesterday   - DVT Prophylaxis: Heparin subcutaneous 5000U q8h     Infectious Disease: Sepsis 2/2 PNA vs UTI   -History: -History: Recently treated with rocephin for lower leg wound infection at [facility]   -MRSA negative  -Abx: Vancomycin (6/28-6/29), Cefepime (6/28-6/28), Zosyn (6/29-7/1)  -DC zosyn and start augmentin  -Cultures:               Blood Culture 6/28: No growth to date              Urine Culture 6/28: No growth to date     Musculoskeletal:   - PT to see   - OT to see     Consult palliative care         I spent 50 minutes in the professional and overall care of this patient.      Jennifer Thompson, APRN-CNP

## 2024-07-01 NOTE — CARE PLAN
The patient's goals for the shift include      The clinical goals for the shift include Patient to remain hemodynamically stable throughout shift    Over the shift, the patient did not make progress toward the following goals. Barriers to progression include ***. Recommendations to address these barriers include ***.

## 2024-07-02 LAB
ABO GROUP (TYPE) IN BLOOD: NORMAL
ALBUMIN SERPL BCP-MCNC: 2.4 G/DL (ref 3.4–5)
ALBUMIN SERPL BCP-MCNC: 2.6 G/DL (ref 3.4–5)
ALP SERPL-CCNC: 114 U/L (ref 33–136)
ALT SERPL W P-5'-P-CCNC: 17 U/L (ref 7–45)
ANION GAP SERPL CALC-SCNC: 10 MMOL/L (ref 10–20)
ANION GAP SERPL CALC-SCNC: 12 MMOL/L (ref 10–20)
ANTIBODY SCREEN: NORMAL
AST SERPL W P-5'-P-CCNC: 14 U/L (ref 9–39)
BACTERIA BLD CULT: NORMAL
BACTERIA BLD CULT: NORMAL
BASOPHILS # BLD AUTO: 0.02 X10*3/UL (ref 0–0.1)
BASOPHILS NFR BLD AUTO: 0.3 %
BILIRUB SERPL-MCNC: 0.4 MG/DL (ref 0–1.2)
BLOOD EXPIRATION DATE: NORMAL
BUN SERPL-MCNC: 41 MG/DL (ref 6–23)
BUN SERPL-MCNC: 42 MG/DL (ref 6–23)
CALCIUM SERPL-MCNC: 7.8 MG/DL (ref 8.6–10.3)
CALCIUM SERPL-MCNC: 8.1 MG/DL (ref 8.6–10.3)
CHLORIDE SERPL-SCNC: 117 MMOL/L (ref 98–107)
CHLORIDE SERPL-SCNC: 118 MMOL/L (ref 98–107)
CO2 SERPL-SCNC: 23 MMOL/L (ref 21–32)
CO2 SERPL-SCNC: 25 MMOL/L (ref 21–32)
CREAT SERPL-MCNC: 1.88 MG/DL (ref 0.5–1.05)
CREAT SERPL-MCNC: 2.07 MG/DL (ref 0.5–1.05)
DISPENSE STATUS: NORMAL
EGFRCR SERPLBLD CKD-EPI 2021: 27 ML/MIN/1.73M*2
EGFRCR SERPLBLD CKD-EPI 2021: 30 ML/MIN/1.73M*2
EOSINOPHIL # BLD AUTO: 0.02 X10*3/UL (ref 0–0.7)
EOSINOPHIL NFR BLD AUTO: 0.3 %
ERYTHROCYTE [DISTWIDTH] IN BLOOD BY AUTOMATED COUNT: 19.6 % (ref 11.5–14.5)
GLUCOSE BLD MANUAL STRIP-MCNC: 160 MG/DL (ref 74–99)
GLUCOSE BLD MANUAL STRIP-MCNC: 162 MG/DL (ref 74–99)
GLUCOSE BLD MANUAL STRIP-MCNC: 179 MG/DL (ref 74–99)
GLUCOSE BLD MANUAL STRIP-MCNC: 195 MG/DL (ref 74–99)
GLUCOSE BLD MANUAL STRIP-MCNC: 230 MG/DL (ref 74–99)
GLUCOSE BLD MANUAL STRIP-MCNC: 240 MG/DL (ref 74–99)
GLUCOSE SERPL-MCNC: 197 MG/DL (ref 74–99)
GLUCOSE SERPL-MCNC: 255 MG/DL (ref 74–99)
HCT VFR BLD AUTO: 21.8 % (ref 36–46)
HGB BLD-MCNC: 6.8 G/DL (ref 12–16)
IMM GRANULOCYTES # BLD AUTO: 0.33 X10*3/UL (ref 0–0.7)
IMM GRANULOCYTES NFR BLD AUTO: 4.8 % (ref 0–0.9)
LYMPHOCYTES # BLD AUTO: 1.16 X10*3/UL (ref 1.2–4.8)
LYMPHOCYTES NFR BLD AUTO: 16.8 %
MAGNESIUM SERPL-MCNC: 1.83 MG/DL (ref 1.6–2.4)
MCH RBC QN AUTO: 30.5 PG (ref 26–34)
MCHC RBC AUTO-ENTMCNC: 31.2 G/DL (ref 32–36)
MCV RBC AUTO: 98 FL (ref 80–100)
MONOCYTES # BLD AUTO: 0.48 X10*3/UL (ref 0.1–1)
MONOCYTES NFR BLD AUTO: 7 %
NEUTROPHILS # BLD AUTO: 4.89 X10*3/UL (ref 1.2–7.7)
NEUTROPHILS NFR BLD AUTO: 70.8 %
NRBC BLD-RTO: 1.3 /100 WBCS (ref 0–0)
PATH REVIEW-CBC DIFFERENTIAL: NORMAL
PHOSPHATE SERPL-MCNC: 2.3 MG/DL (ref 2.5–4.9)
PHOSPHATE SERPL-MCNC: 2.6 MG/DL (ref 2.5–4.9)
PLATELET # BLD AUTO: 43 X10*3/UL (ref 150–450)
POTASSIUM SERPL-SCNC: 3.6 MMOL/L (ref 3.5–5.3)
POTASSIUM SERPL-SCNC: 4.2 MMOL/L (ref 3.5–5.3)
PRODUCT BLOOD TYPE: 5100
PRODUCT CODE: NORMAL
PROT SERPL-MCNC: 4.6 G/DL (ref 6.4–8.2)
RBC # BLD AUTO: 2.23 X10*6/UL (ref 4–5.2)
RH FACTOR (ANTIGEN D): NORMAL
SODIUM SERPL-SCNC: 148 MMOL/L (ref 136–145)
SODIUM SERPL-SCNC: 149 MMOL/L (ref 136–145)
UNIT ABO: NORMAL
UNIT NUMBER: NORMAL
UNIT RH: NORMAL
UNIT VOLUME: 350
WBC # BLD AUTO: 6.9 X10*3/UL (ref 4.4–11.3)
XM INTEP: NORMAL

## 2024-07-02 PROCEDURE — P9040 RBC LEUKOREDUCED IRRADIATED: HCPCS

## 2024-07-02 PROCEDURE — 82947 ASSAY GLUCOSE BLOOD QUANT: CPT

## 2024-07-02 PROCEDURE — 2500000004 HC RX 250 GENERAL PHARMACY W/ HCPCS (ALT 636 FOR OP/ED): Performed by: NURSE PRACTITIONER

## 2024-07-02 PROCEDURE — 97162 PT EVAL MOD COMPLEX 30 MIN: CPT | Mod: GP

## 2024-07-02 PROCEDURE — 2500000005 HC RX 250 GENERAL PHARMACY W/O HCPCS: Performed by: EMERGENCY MEDICINE

## 2024-07-02 PROCEDURE — 2500000002 HC RX 250 W HCPCS SELF ADMINISTERED DRUGS (ALT 637 FOR MEDICARE OP, ALT 636 FOR OP/ED): Performed by: EMERGENCY MEDICINE

## 2024-07-02 PROCEDURE — 83735 ASSAY OF MAGNESIUM: CPT | Performed by: NURSE PRACTITIONER

## 2024-07-02 PROCEDURE — 2500000001 HC RX 250 WO HCPCS SELF ADMINISTERED DRUGS (ALT 637 FOR MEDICARE OP)

## 2024-07-02 PROCEDURE — 99291 CRITICAL CARE FIRST HOUR: CPT | Performed by: NURSE PRACTITIONER

## 2024-07-02 PROCEDURE — 2500000004 HC RX 250 GENERAL PHARMACY W/ HCPCS (ALT 636 FOR OP/ED): Performed by: STUDENT IN AN ORGANIZED HEALTH CARE EDUCATION/TRAINING PROGRAM

## 2024-07-02 PROCEDURE — 86920 COMPATIBILITY TEST SPIN: CPT

## 2024-07-02 PROCEDURE — 80069 RENAL FUNCTION PANEL: CPT | Mod: CCI | Performed by: NURSE PRACTITIONER

## 2024-07-02 PROCEDURE — 85025 COMPLETE CBC W/AUTO DIFF WBC: CPT | Performed by: EMERGENCY MEDICINE

## 2024-07-02 PROCEDURE — 84100 ASSAY OF PHOSPHORUS: CPT | Performed by: NURSE PRACTITIONER

## 2024-07-02 PROCEDURE — P9016 RBC LEUKOCYTES REDUCED: HCPCS

## 2024-07-02 PROCEDURE — 86901 BLOOD TYPING SEROLOGIC RH(D): CPT

## 2024-07-02 PROCEDURE — 82374 ASSAY BLOOD CARBON DIOXIDE: CPT | Performed by: EMERGENCY MEDICINE

## 2024-07-02 PROCEDURE — 97166 OT EVAL MOD COMPLEX 45 MIN: CPT | Mod: GO | Performed by: OCCUPATIONAL THERAPIST

## 2024-07-02 PROCEDURE — 2500000004 HC RX 250 GENERAL PHARMACY W/ HCPCS (ALT 636 FOR OP/ED): Performed by: EMERGENCY MEDICINE

## 2024-07-02 PROCEDURE — 2500000001 HC RX 250 WO HCPCS SELF ADMINISTERED DRUGS (ALT 637 FOR MEDICARE OP): Performed by: EMERGENCY MEDICINE

## 2024-07-02 PROCEDURE — C9113 INJ PANTOPRAZOLE SODIUM, VIA: HCPCS | Performed by: EMERGENCY MEDICINE

## 2024-07-02 PROCEDURE — 36430 TRANSFUSION BLD/BLD COMPNT: CPT

## 2024-07-02 PROCEDURE — 37799 UNLISTED PX VASCULAR SURGERY: CPT

## 2024-07-02 PROCEDURE — 2020000001 HC ICU ROOM DAILY

## 2024-07-02 PROCEDURE — 92610 EVALUATE SWALLOWING FUNCTION: CPT | Mod: GN | Performed by: SPEECH-LANGUAGE PATHOLOGIST

## 2024-07-02 PROCEDURE — 2500000001 HC RX 250 WO HCPCS SELF ADMINISTERED DRUGS (ALT 637 FOR MEDICARE OP): Performed by: NURSE PRACTITIONER

## 2024-07-02 PROCEDURE — 37799 UNLISTED PX VASCULAR SURGERY: CPT | Performed by: EMERGENCY MEDICINE

## 2024-07-02 PROCEDURE — 97535 SELF CARE MNGMENT TRAINING: CPT | Mod: GO | Performed by: OCCUPATIONAL THERAPIST

## 2024-07-02 RX ORDER — POTASSIUM CHLORIDE 1.5 G/1.58G
40 POWDER, FOR SOLUTION ORAL ONCE
Status: COMPLETED | OUTPATIENT
Start: 2024-07-02 | End: 2024-07-02

## 2024-07-02 RX ORDER — AMOXICILLIN AND CLAVULANATE POTASSIUM 875; 125 MG/1; MG/1
1 TABLET, FILM COATED ORAL EVERY 12 HOURS SCHEDULED
Status: DISCONTINUED | OUTPATIENT
Start: 2024-07-02 | End: 2024-07-02

## 2024-07-02 RX ORDER — INSULIN LISPRO 100 [IU]/ML
0-5 INJECTION, SOLUTION INTRAVENOUS; SUBCUTANEOUS
Status: DISCONTINUED | OUTPATIENT
Start: 2024-07-02 | End: 2024-07-02

## 2024-07-02 RX ORDER — INSULIN LISPRO 100 [IU]/ML
0-5 INJECTION, SOLUTION INTRAVENOUS; SUBCUTANEOUS EVERY 4 HOURS
Status: DISCONTINUED | OUTPATIENT
Start: 2024-07-02 | End: 2024-07-03

## 2024-07-02 RX ORDER — RISPERIDONE 0.5 MG/1
1 TABLET ORAL 2 TIMES DAILY
Status: DISCONTINUED | OUTPATIENT
Start: 2024-07-02 | End: 2024-07-02

## 2024-07-02 RX ORDER — METOPROLOL TARTRATE 25 MG/1
12.5 TABLET, FILM COATED ORAL 2 TIMES DAILY
Status: DISCONTINUED | OUTPATIENT
Start: 2024-07-02 | End: 2024-07-04

## 2024-07-02 RX ORDER — DEXTROSE MONOHYDRATE 50 MG/ML
75 INJECTION, SOLUTION INTRAVENOUS CONTINUOUS
Status: ACTIVE | OUTPATIENT
Start: 2024-07-02 | End: 2024-07-03

## 2024-07-02 RX ORDER — RISPERIDONE 0.5 MG/1
1 TABLET ORAL 2 TIMES DAILY
Status: DISCONTINUED | OUTPATIENT
Start: 2024-07-02 | End: 2024-07-04

## 2024-07-02 RX ORDER — AMOXICILLIN AND CLAVULANATE POTASSIUM 875; 125 MG/1; MG/1
1 TABLET, FILM COATED ORAL EVERY 12 HOURS SCHEDULED
Status: DISCONTINUED | OUTPATIENT
Start: 2024-07-02 | End: 2024-07-04

## 2024-07-02 RX ORDER — METOPROLOL TARTRATE 25 MG/1
12.5 TABLET, FILM COATED ORAL 2 TIMES DAILY
Status: DISCONTINUED | OUTPATIENT
Start: 2024-07-02 | End: 2024-07-02

## 2024-07-02 RX ORDER — INSULIN LISPRO 100 [IU]/ML
0-10 INJECTION, SOLUTION INTRAVENOUS; SUBCUTANEOUS
Status: DISCONTINUED | OUTPATIENT
Start: 2024-07-02 | End: 2024-07-02

## 2024-07-02 ASSESSMENT — COGNITIVE AND FUNCTIONAL STATUS - GENERAL
MOVING TO AND FROM BED TO CHAIR: TOTAL
MOBILITY SCORE: 6
DRESSING REGULAR LOWER BODY CLOTHING: TOTAL
PERSONAL GROOMING: TOTAL
MOBILITY SCORE: 6
PERSONAL GROOMING: TOTAL
MOVING TO AND FROM BED TO CHAIR: TOTAL
CLIMB 3 TO 5 STEPS WITH RAILING: TOTAL
MOVING FROM LYING ON BACK TO SITTING ON SIDE OF FLAT BED WITH BEDRAILS: TOTAL
DRESSING REGULAR UPPER BODY CLOTHING: TOTAL
HELP NEEDED FOR BATHING: TOTAL
DRESSING REGULAR UPPER BODY CLOTHING: TOTAL
TOILETING: TOTAL
WALKING IN HOSPITAL ROOM: TOTAL
DAILY ACTIVITIY SCORE: 6
MOBILITY SCORE: 6
CLIMB 3 TO 5 STEPS WITH RAILING: TOTAL
EATING MEALS: TOTAL
CLIMB 3 TO 5 STEPS WITH RAILING: TOTAL
DRESSING REGULAR LOWER BODY CLOTHING: TOTAL
WALKING IN HOSPITAL ROOM: TOTAL
HELP NEEDED FOR BATHING: TOTAL
STANDING UP FROM CHAIR USING ARMS: TOTAL
TOILETING: TOTAL
DAILY ACTIVITIY SCORE: 6
DAILY ACTIVITIY SCORE: 6
DRESSING REGULAR UPPER BODY CLOTHING: TOTAL
TURNING FROM BACK TO SIDE WHILE IN FLAT BAD: TOTAL
STANDING UP FROM CHAIR USING ARMS: TOTAL
MOVING FROM LYING ON BACK TO SITTING ON SIDE OF FLAT BED WITH BEDRAILS: TOTAL
DRESSING REGULAR LOWER BODY CLOTHING: TOTAL
TOILETING: TOTAL
PERSONAL GROOMING: TOTAL
HELP NEEDED FOR BATHING: TOTAL
MOVING FROM LYING ON BACK TO SITTING ON SIDE OF FLAT BED WITH BEDRAILS: TOTAL
WALKING IN HOSPITAL ROOM: TOTAL
EATING MEALS: TOTAL
MOVING TO AND FROM BED TO CHAIR: TOTAL
STANDING UP FROM CHAIR USING ARMS: TOTAL
TURNING FROM BACK TO SIDE WHILE IN FLAT BAD: TOTAL
EATING MEALS: TOTAL
TURNING FROM BACK TO SIDE WHILE IN FLAT BAD: TOTAL

## 2024-07-02 ASSESSMENT — PAIN - FUNCTIONAL ASSESSMENT
PAIN_FUNCTIONAL_ASSESSMENT: 0-10

## 2024-07-02 ASSESSMENT — PAIN SCALES - GENERAL
PAINLEVEL_OUTOF10: 0 - NO PAIN

## 2024-07-02 ASSESSMENT — ACTIVITIES OF DAILY LIVING (ADL)
HOME_MANAGEMENT_TIME_ENTRY: 13
ADL_ASSISTANCE: NEEDS ASSISTANCE
ADL_ASSISTANCE: NEEDS ASSISTANCE

## 2024-07-02 NOTE — NURSING NOTE
"Pt more awake and alert this morning, still remains confused to situation with mild hallucinations. Pt states that she is \"tired\". She often stops mid conversation and stares off to the corners of the room. Needs redirected often. Phone call placed to pts son, support provided as he continues to work on pts medicaid application. We discussed referral to [facility] for outpatient palliative care when pts is closer to discharge. López is in agreement with this. He is not ready for hospice care at this time as he states pt has \"good\" quality of life given her illness when she is at the NH. They recently bought her new furniture, a new TV and other amenities to make her more comfortable. We discussed nutrition needs as corpak remains in place. López states that nutrition has never been a long term issue and pt has always been able to start eating on her own and have the corpak removed once she is feeling better.     Plan to continue to follow for supportive care.  Will refer to outpatient pall team closer to discharge.  "

## 2024-07-02 NOTE — PROGRESS NOTES
Physical Therapy    Physical Therapy Evaluation    Patient Name: Bing Holliday  MRN: [id number]  Today's Date: 7/2/2024   Time Calculation  Start Time: 1118  Stop Time: 1147  Time Calculation (min): 29 min  2128/2128-A    Assessment/Plan   PT Assessment  PT Assessment Results: Decreased strength, Decreased range of motion, Decreased endurance, Impaired balance, Decreased mobility, Decreased cognition, Impaired judgement, Decreased safety awareness, Impaired vision  Evaluation/Treatment Tolerance: Patient limited by fatigue  Medical Staff Made Aware: Yes  End of Session Communication: Bedside nurse  Assessment Comment: Pt is a 64 y/o female admitted for AMS and pancytopenia. Pt presents with pain, cognitive deficits, decreased strength, endurance and balance. Pt able to tolerate bed mobility only this date with significant assistance. She is functioning below baseline level of function and will benefit from skilled therapy during stay to improve overall functional mobility and safety awareness. Upon discharge pt will benefit from moderate intensity therapy for continued improvement in functional mobility.     End of Session Patient Position: Bed, 3 rail up, Alarm on (call light within reach)  IP OR SWING BED PT PLAN  Inpatient or Swing Bed: Inpatient  PT Plan  Treatment/Interventions: Bed mobility, Transfer training, Gait training, Balance training, Neuromuscular re-education, Strengthening, Endurance training, Therapeutic exercise, Range of motion, Therapeutic activity, Home exercise program, Positioning, Postural re-education  PT Plan: Ongoing PT  PT Frequency: 3 times per week  PT Discharge Recommendations: Moderate intensity level of continued care  PT Recommended Transfer Status: Total assist  PT - OK to Discharge: Yes (Once medically cleared to next level of care.)    Subjective     Current Problem:  1. Pancytopenia (Multi)  Central Line    Central Line      2. Septic shock (Multi)  Central Line    Central  Line      3. Hypothermia, initial encounter        4. Acute kidney injury superimposed on CKD (CMS-HCC)        5. Altered mental status, unspecified altered mental status type          Patient Active Problem List   Diagnosis    COVID-19    Lupus (Multi)    Ambulatory dysfunction    Myelodysplastic syndrome, unspecified (Multi)    Lupus vasculitis (Multi)    Hyperlipidemia    Pneumonia of both lower lobes due to infectious organism    Hallucinations    Leg swelling    Hyperglycemia    JED (acute kidney injury) (CMS-HCC)    Hypernatremia    Proliferative diabetic retinopathy of right eye without macular edema determined by examination associated with type 2 diabetes mellitus (Multi)    Urinary incontinence    Paraparesis (Multi)    Abdominal cramping    Abnormal echocardiogram    Abnormal gait    Abnormal thyroid blood test    Advanced COPD (Multi)    Afferent pupillary defect of right eye    Anemia    Pancytopenia (Multi)    Altitudinal scotoma of right eye    Arcuate scotoma of left eye    Arthropathy    Asymptomatic PVCs    PAF (paroxysmal atrial fibrillation) (Multi)    Balance problem    Bilateral high frequency sensorineural hearing loss    Bradycardia    Branch retinal artery occlusion    Cardiomyopathy (Multi)    Chronic diarrhea    Chronic fatigue    Chronic kidney disease (CKD), stage III (moderate) (Multi)    CKD stage G3a/A2, GFR 45-59 and albumin creatinine ratio  mg/g (Multi)    Combined forms of age-related cataract of left eye    Combined forms of age-related cataract of right eye    Contracture of hand    Snoring    CVA (cerebral vascular accident) (Multi)    Daytime somnolence    Degeneration of lumbar/lumbosacral disc without myelopathy    Depression, major    Dizziness    Dupuytren's contracture    Eczema    Elevated alkaline phosphatase level    Encephalopathy acute    Facial twitching    Fibromyalgia    Fungal nail infection    GERD (gastroesophageal reflux disease)    Gouty arthropathy     H/O iritis    H/O orthostatic hypotension    Hereditary elliptocytosis (CMS-HCC)    High level of cardiac marker    Hip hematoma, right    Hypothermia    Insomnia    Leg edema, left    Loss of consciousness (Multi)    Low blood sugar reading    Lung nodule, multiple    Lupus nephritis (Multi)    Metabolic encephalopathy    Muscle cramps    Nodule of skin of left lower leg    Obstructive lung disease (Multi)    Osteoarthritis of left hand    Osteoarthritis of right hand    Osteopenia    Osteoporosis    Palpitations    Peripheral visual field defect    Persistent proteinuria    Positive colorectal cancer screening using Cologuard test    Benign essential HTN    Psychosis (Multi)    Pulmonary hypertension (Multi)    Refractive error    Hypertensive retinopathy    Retinal neovascularization    Secondary pulmonary arterial hypertension (Multi)    Shortness of breath on exertion    Spinal stenosis of lumbar region with neurogenic claudication    Subjective tinnitus of both ears    Swelling of right foot    Syncope and collapse    Thrombophlebitis of superficial veins of left lower extremity    Tinnitus of left ear    Vitamin D deficiency    DM type 2 with diabetic peripheral neuropathy (Multi)    Systemic lupus erythematosus (Multi)    Diabetic nephropathy (Multi)    Functional diarrhea    UTI (urinary tract infection)    Moderate protein-calorie malnutrition (Multi)    Shock, unspecified (Multi)    Hyperkalemia    Adrenal insufficiency (Multi)    Seizure-like activity (Multi)    Meningitis (HHS-HCC)    Encephalopathy    Seizure (Multi)    Uncontrolled blood glucose    Cervical spondylosis with myelopathy    COPD (chronic obstructive pulmonary disease) (Multi)    Encounter for diabetes education    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)    Chronic kidney disease, stage 4 (severe) (Multi)    Ulcerative (chronic) pancolitis with rectal bleeding (Multi)    Anxiety    Cortical  cataract    Hyperparathyroidism due to renal insufficiency (Multi)    Iron deficiency anemia due to chronic blood loss    Left ventricular hypertrophy    Nephrosclerosis    Obesity    Ocular migraine    Persistent cough    Proliferative diabetic retinopathy (Multi)    Raynaud's disease    Disorientation    Bradycardia, unspecified    Cellulitis of leg without foot     General Visit Information:  General  Referred By: Dr. Marin  Family/Caregiver Present: No  Co-Treatment: OT  Co-Treatment Reason: To maximize pt safety with functional mobility as pt AMPAC </= 10.  Prior to Session Communication: Bedside nurse  Patient Position Received: Bed, 4 rail up, Alarm on  Preferred Learning Style: verbal  General Comment: Pt agreeable to work with therapy. Mobility limited this date as pt c/o blurred vision and having hallucinations throughout session, RN aware. Increased time during session for candido care.    Home Living:  Home Living  Home Living Comments: According to EMR pt admitted from [facility]. Prior to [facility] pt at [facility].    Prior Level of Function:  Prior Function Per Pt/Caregiver Report  ADL Assistance: Needs assistance  Homemaking Assistance: Needs assistance  Ambulatory Assistance: Needs assistance  Prior Function Comments: Patient reports receiving assist with all self care and transfers to recliner    Precautions:  Precautions  Medical Precautions: Fall precautions, Oxygen therapy device and L/min (2L O2 via NC)    Vital Signs:  Vital Signs  Heart Rate: 85  Heart Rate Source: Monitor  Resp: 20  MAP (mmHg): 2  Objective     Pain:  Pain Assessment  Pain Assessment: 0-10  0-10 (Numeric) Pain Score:  (Pt reported head pain; however, did not numerically rank pain.)    Cognition:  Cognition  Orientation Level: Disoriented to time  Insight: Moderate    General Assessments:  General Observation  General Observation: A-line, NGT   Activity Tolerance  Endurance: Tolerates less than 10 min exercise, no  significant change in vital signs  Sensation  Sensation Comment: Pt sensitive to light touch     Coordination  Coordination Comment: Assessed facial symmetry, WFL.     Functional Assessments:     Bed Mobility  Bed Mobility: Yes  Bed Mobility 1  Bed Mobility 1: Rolling right, Rolling left  Level of Assistance 1: Maximum assistance, +2  Bed Mobility Comments 1: Use of draw sheets, increased time for candido care  Bed Mobility 2  Bed Mobility  2: Scooting  Level of Assistance 2: Dependent  Bed Mobility Comments 2: Boost towards HOB in supine for optimal positioning with use of draw sheets     Extremity/Trunk Assessments:     RLE   RLE :  (AROM WFL. Assumed >/= 2/5 strength based on pt ability to complete hip/knee flex/ext and ankle DF/PF with manual resistance.)  LLE   LLE :  (AROM WFL. Assumed >/= 2/5 strength based on pt ability to complete hip/knee flex/ext and ankle DF/PF with manual resistance.)    Outcome Measures:     [facility] Basic Mobility  Turning from your back to your side while in a flat bed without using bedrails: Total  Moving from lying on your back to sitting on the side of a flat bed without using bedrails: Total  Moving to and from bed to chair (including a wheelchair): Total  Standing up from a chair using your arms (e.g. wheelchair or bedside chair): Total  To walk in hospital room: Total  Climbing 3-5 steps with railing: Total  Basic Mobility - Total Score: 6     Goals:  Encounter Problems       Encounter Problems (Active)       Balance       Pt will demonstrate static/dynamic sitting balance for >/= 5 min CGA with UE/LE support without evidence of instability or LOB.        Start:  07/02/24    Expected End:  07/16/24            Pt will demonstrate static/dynamic standing balance for >/= 3 min mod A x2 without evidence of instability or LOB with functional mobility tasks.         Start:  07/02/24    Expected End:  07/16/24                   Mobility       Pt will complete supine, seated and standing  exercises to maintain/improve overall strength with minimal verbal cues.         Start:  07/02/24    Expected End:  07/16/24               PT Transfers       Pt will be able to complete all bed mobility tasks with use of bed HR mod A.       Start:  07/02/24    Expected End:  07/16/24            Pt will be able to complete all transfers with least restrictive device mod A x2 demonstrating good safety awareness and proper body mechanics.        Start:  07/02/24    Expected End:  07/16/24                 Education Documentation  Home Exercise Program, taught by Ny Andrade PT at 7/2/2024  2:49 PM.  Learner: Patient  Readiness: Acceptance  Method: Explanation  Response: Verbalizes Understanding, Needs Reinforcement    Precautions, taught by Ny Andrade PT at 7/2/2024  2:49 PM.  Learner: Patient  Readiness: Acceptance  Method: Explanation  Response: Verbalizes Understanding, Needs Reinforcement    Body Mechanics, taught by Ny Andrade PT at 7/2/2024  2:49 PM.  Learner: Patient  Readiness: Acceptance  Method: Explanation  Response: Verbalizes Understanding, Needs Reinforcement    Mobility Training, taught by Ny Andrade PT at 7/2/2024  2:49 PM.  Learner: Patient  Readiness: Acceptance  Method: Explanation  Response: Verbalizes Understanding, Needs Reinforcement    Education Comments

## 2024-07-02 NOTE — PROGRESS NOTES
"Bing Holliday is a 63 y.o. female on day 4 of admission presenting with Pancytopenia (Multi).    Subjective   No acute events overnight.  Patient is much more awake, conversant and stronger than yesterday       Objective     Physical Exam  HENT:      Head: Normocephalic and atraumatic.      Mouth/Throat:      Mouth: Mucous membranes are dry.   Eyes:      Extraocular Movements: Extraocular movements intact.   Cardiovascular:      Rate and Rhythm: Normal rate. Rhythm irregular.      Pulses: Normal pulses.      Heart sounds: Normal heart sounds.   Pulmonary:      Effort: Pulmonary effort is normal.      Breath sounds: Normal breath sounds.   Abdominal:      General: Bowel sounds are normal.      Palpations: Abdomen is soft.   Musculoskeletal:         General: Normal range of motion.   Skin:     General: Skin is warm and dry.      Capillary Refill: Capillary refill takes less than 2 seconds.   Neurological:      Mental Status: She is alert. Mental status is at baseline.         Last Recorded Vitals  Blood pressure 148/76, pulse 88, temperature 36.2 °C (97.2 °F), resp. rate 14, height 1.778 m (5' 10\"), weight 88.3 kg (194 lb 10.7 oz), SpO2 100%.  Intake/Output last 3 Shifts:  I/O last 3 completed shifts:  In: 3440.2 (39 mL/kg) [I.V.:365.2 (4.1 mL/kg); NG/GT:2975; IV Piggyback:100]  Out: 1603 (18.2 mL/kg) [Urine:1602 (0.5 mL/kg/hr); Stool:1]  Weight: 88.3 kg     Relevant Results  Scheduled medications  amoxicillin-pot clavulanate, 1 tablet, nasoduodenal tube, q12h GERALD  atorvastatin, 40 mg, nasogastric tube, Nightly  folic acid, 1 mg, nasogastric tube, Daily  [Held by provider] heparin, 5,000 Units, subcutaneous, q8h  hydrocortisone sodium succinate, 25 mg, intravenous, q8h  insulin lispro, 0-5 Units, subcutaneous, q4h  levETIRAcetam, 500 mg, nasogastric tube, q12h  melatonin, 5 mg, nasogastric tube, Nightly  metoprolol tartrate, 12.5 mg, nasoduodenal tube, BID  multivitamin with minerals, 1 tablet, nasogastric tube, " Daily  pantoprazole, 40 mg, oral, Daily before breakfast   Or  pantoprazole, 40 mg, intravenous, Daily before breakfast  risperiDONE, 1 mg, nasoduodenal tube, BID  sertraline, 25 mg, nasogastric tube, Daily  thiamine, 100 mg, nasogastric tube, Daily      Continuous medications     PRN medications  PRN medications: dextrose, dextrose, glucagon, glucagon, magnesium sulfate, magnesium sulfate, OLANZapine, oxygen, potassium chloride    Results for orders placed or performed during the hospital encounter of 06/28/24 (from the past 24 hour(s))   POCT GLUCOSE   Result Value Ref Range    POCT Glucose 281 (H) 74 - 99 mg/dL   POCT GLUCOSE   Result Value Ref Range    POCT Glucose 218 (H) 74 - 99 mg/dL   POCT GLUCOSE   Result Value Ref Range    POCT Glucose 195 (H) 74 - 99 mg/dL   Comprehensive Metabolic Panel   Result Value Ref Range    Glucose 197 (H) 74 - 99 mg/dL    Sodium 149 (H) 136 - 145 mmol/L    Potassium 3.6 3.5 - 5.3 mmol/L    Chloride 118 (H) 98 - 107 mmol/L    Bicarbonate 23 21 - 32 mmol/L    Anion Gap 12 10 - 20 mmol/L    Urea Nitrogen 41 (H) 6 - 23 mg/dL    Creatinine 2.07 (H) 0.50 - 1.05 mg/dL    eGFR 27 (L) >60 mL/min/1.73m*2    Calcium 7.8 (L) 8.6 - 10.3 mg/dL    Albumin 2.4 (L) 3.4 - 5.0 g/dL    Alkaline Phosphatase 114 33 - 136 U/L    Total Protein 4.6 (L) 6.4 - 8.2 g/dL    AST 14 9 - 39 U/L    Bilirubin, Total 0.4 0.0 - 1.2 mg/dL    ALT 17 7 - 45 U/L   CBC and Auto Differential   Result Value Ref Range    WBC 6.9 4.4 - 11.3 x10*3/uL    nRBC 1.3 (H) 0.0 - 0.0 /100 WBCs    RBC 2.23 (L) 4.00 - 5.20 x10*6/uL    Hemoglobin 6.8 (L) 12.0 - 16.0 g/dL    Hematocrit 21.8 (L) 36.0 - 46.0 %    MCV 98 80 - 100 fL    MCH 30.5 26.0 - 34.0 pg    MCHC 31.2 (L) 32.0 - 36.0 g/dL    RDW 19.6 (H) 11.5 - 14.5 %    Platelets 43 (L) 150 - 450 x10*3/uL    Neutrophils % 70.8 40.0 - 80.0 %    Immature Granulocytes %, Automated 4.8 (H) 0.0 - 0.9 %    Lymphocytes % 16.8 13.0 - 44.0 %    Monocytes % 7.0 2.0 - 10.0 %    Eosinophils % 0.3  0.0 - 6.0 %    Basophils % 0.3 0.0 - 2.0 %    Neutrophils Absolute 4.89 1.20 - 7.70 x10*3/uL    Immature Granulocytes Absolute, Automated 0.33 0.00 - 0.70 x10*3/uL    Lymphocytes Absolute 1.16 (L) 1.20 - 4.80 x10*3/uL    Monocytes Absolute 0.48 0.10 - 1.00 x10*3/uL    Eosinophils Absolute 0.02 0.00 - 0.70 x10*3/uL    Basophils Absolute 0.02 0.00 - 0.10 x10*3/uL   Phosphorus   Result Value Ref Range    Phosphorus 2.6 2.5 - 4.9 mg/dL   Magnesium   Result Value Ref Range    Magnesium 1.83 1.60 - 2.40 mg/dL   POCT GLUCOSE   Result Value Ref Range    POCT Glucose 162 (H) 74 - 99 mg/dL   Prepare RBC: 1 Units, Irradiated   Result Value Ref Range    PRODUCT CODE G3392S67     Unit Number [id number]     Unit ABO O     Unit RH POS     XM INTEP COMP     Dispense Status TR     Blood Expiration Date July 24, 2024 23:59 EDT     PRODUCT BLOOD TYPE 5100     UNIT VOLUME 350    Type and screen   Result Value Ref Range    ABO TYPE O     Rh TYPE POS     ANTIBODY SCREEN NEG    POCT GLUCOSE   Result Value Ref Range    POCT Glucose 160 (H) 74 - 99 mg/dL   POCT GLUCOSE   Result Value Ref Range    POCT Glucose 179 (H) 74 - 99 mg/dL               This patient has a urinary catheter   Reason for the urinary catheter remaining today? Urine catheter unnecessary, will be removed today          Malnutrition Diagnosis Status: New  Malnutrition Diagnosis: Moderate malnutrition related to chronic disease or condition  As Evidenced by: >5% wt loss x 1 month with reported intakes <75% of estimated nutritional needs.  I agree with the dietitian's malnutrition diagnosis.      Assessment/Plan   Principal Problem:    Pancytopenia (Multi)    Patient is a 63 y.o. 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      Neuro: Metabolic Encephalopathy 2/2 adrenal insufficiency vs sepsis-mental status improving dramatically over past 24h  -History: Multiple admissions for AMS found to be in adrenal insufficiency, lupus cerebritis   -Home meds: Zoloft, Keppra 500 mg twice daily, meclizine, paliperidone  -Sedation: Precedex off for > 24h.  Unable to start paliperidone is unable to crush.  Cont alternate medication Risperdal .  Start paliperidone when awake enough to swallow   -[facility] ICU  - CT head negative   -Continue Keppra 500mg IV BID  -Zyprexa 5mg q6 prn for agitation      Cardiac: Septic Shock refractory to IVF 2/2 PNA vs UTI, elevated troponin with ST depressions likely 2/2 demand ischemia in setting of acute anemia  - History: afib (eliquis), bradycardia 2/2 sinus node dysfunction s/p pacemaker (5/17/2024), CAD s/p CABG 2013, HFmREF (EF 40-45% 5/2024)  - Goal MAP > 65  - Home meds: Atorvastatin, cont metoprolol at half home dose  - Last echo: 40-45% 5/2024  - Pressors: Levophed (off since 0600 6/30)  -Hemodynamically stable     Pulmonary: AHRF 2/2 CAP, resolved  -History: COPD (no PFTs)  -Home meds: Albuterol  Continuous pulse oximetry   O2 PRN to maintain SpO2 > 94%, wean as tolerated  -Imaging: CTA New right lower lobe nodular opacities, likely infectious/inflammatory.  Follow-up 3 to 6 months recommended. DC zosyn and start augmentin  -Duonebs q6     Gastrointestinal: No active concerns  -History: GERD  -Home meds: Pantoprazole  - Diet: NPO. Speech therapy consulted for swallow eval  -Continue enteral feeds via [place]  - Prophylaxis:  protonix  - Bowel regimen: None      Renal: JED on CKD3, likely pre-renal in setting of decreased PO intake +/- ischemia ATN. Acute on chronic hypernatremia,   - Daily RFP,Mg,Phos  - History: CKD3 (bl Cr 1.5-1.9)  - Home meds: Lasix 40; remains on hold  - [place] with strict I/O  - Avoid nephrotoxic agents  -Hypernatremia - Increase water flushes to 250ml q4h. Repeat RFP  this afternoon. If no improvement, start D5W. Free water deficit calculated 2.8L  - Electrolytes: Replete per protocol, goal K>4 Phos >3 Mg >2     Endocrine/Rheumatology: Adrenal Insufficiency in setting of infection  -History: recurrent adrenal insufficiency (hydrocortisone), SLE cerebritis and Jaccoud arthropathy on florinef  -Home meds: Lantus 10 units in a.m., sliding scale, Cortef 20 mg in a.m./20 mg p.m., flu cortisone 0.1 mg every other day  - sliding scale: Mild, hold insulin if BS <250  - Endocrinology consulted  -Solucortef decreased to 25mg q8h  - Hypoglycemia protocol. Goal glucose 200s to avoid hypoglycemia        Hematology: Acute on chronic anemia of chronic disease, chronic pancytopenia   - Daily CBC, coags  -History: Pancytopenia   -Home meds: Eliquis 2.5 mg twice daily on hold.  H&H 6.8/21.8 today.  -Worsening thrombocytopenia-chronic versus sepsis versus Zosyn.  Will monitor.  - DVT Prophylaxis: Heparin subcutaneous 5000U q8h on hold secondary to platelets less than 50  -Transfused 1 unit of packed red blood cells yesterday, 1 unit of packed red blood cells today.  3 units of packed red blood cells since admission     Infectious Disease: Sepsis 2/2 PNA vs UTI   -History: -History: Recently treated with rocephin for lower leg wound infection at [place] [facility]   -MRSA negative  -Abx: Vancomycin (6/28-6/29), Cefepime (6/28-6/28), Zosyn (6/29-7/1)  -DC zosyn and start augmentin (cont x5days)  -Cultures:               Blood Culture 6/28: No growth to date              Urine Culture 6/28: No growth to date     Musculoskeletal:   - PT to see   - OT to see      Consult to palliative care              I spent 40 minutes in the professional and overall care of this patient.             JIM Schmitt-CNP

## 2024-07-02 NOTE — PROGRESS NOTES
Occupational Therapy  Evaluation    Patient Name: Bing Holliday  MRN: [id number]  Today's Date: 7/2/2024  Time Calculation  Start Time: 1118  Stop Time: 1146  Time Calculation (min): 28 min  2128/2128-A    Assessment  IP OT Assessment  OT Assessment: Patient demonstrates decreased independence with self care, decreased independence with functional transfers, decreased endurance, and decreased balance.  Patient will benefit from skilled OT to address deficits.  Anticipate patient will benefit from moderate intensity therapy.  Prognosis: Good  Barriers to Discharge: None  Evaluation/Treatment Tolerance: Patient limited by fatigue  Medical Staff Made Aware: Yes  End of Session Communication: Bedside nurse  End of Session Patient Position: Bed, 3 rail up, Alarm on    Plan:  Treatment Interventions: ADL retraining, Functional transfer training, UE strengthening/ROM, Endurance training, Cognitive reorientation, Patient/family training  OT Frequency: 3 times per week  OT Discharge Recommendations: Moderate intensity level of continued care  Equipment Recommended upon Discharge: Wheelchair  OT Recommended Transfer Status: Assist of 2  OT - OK to Discharge: Yes (to next level of care when medically cleared by physician)    Subjective     Current Problem:  1. Pancytopenia (Multi)  Central Line    Central Line      2. Septic shock (Multi)  Central Line    Central Line      3. Hypothermia, initial encounter        4. Acute kidney injury superimposed on CKD (CMS-HCC)        5. Altered mental status, unspecified altered mental status type            General:  General  Reason for Referral: impaired self care  Referred By: Dr. Marin  Past Medical History Relevant to Rehab: SLE, COPD, GERD, Afib, pacemaker, CAD  Missed Visit: Yes  Co-Treatment: PT  Co-Treatment Reason: to maximize patient safety, 2 person assist  Prior to Session Communication: Bedside nurse  Patient Position Received: Bed, 4 rail up, Alarm on  Preferred  Learning Style: verbal  General Comment: Patient agreeable to therapy.  Patient hallucinating at times.  Reports blurry vision.  RN aware and present.    Precautions:  Medical Precautions: Fall precautions      Pain:  Pain Assessment  Pain Assessment: 0-10  0-10 (Numeric) Pain Score: 0 - No pain    Objective     Cognition:  Orientation Level: Disoriented to time     Home Living:  Home Living Comments: from [place]     Prior Function:  ADL Assistance: Needs assistance  Homemaking Assistance: Needs assistance  Ambulatory Assistance: Needs assistance  Prior Function Comments: Patient reports receiving assist with all self care and transfers to recliner      ADL:  LE Dressing Assistance: Total  Toileting Assistance with Device: Total    Activity Tolerance:  Endurance: Tolerates less than 10 min exercise, no significant change in vital signs    Bed Mobility/Transfers:   Bed Mobility  Bed Mobility: Yes  Bed Mobility 1  Bed Mobility 1: Rolling right  Level of Assistance 1: Maximum assistance, +2  Bed Mobility 2  Bed Mobility  2: Rolling left  Level of Assistance 2: Maximum assistance, +2          Vision: Vision - Basic Assessment  Current Vision: Wears glasses all the time     Extremities: RUE   RUE :  (limited ROM) and LUE   LUE:  (limited ROM)    Outcome Measures: [facility] Daily Activity  Putting on and taking off regular lower body clothing: Total  Bathing (including washing, rinsing, drying): Total  Putting on and taking off regular upper body clothing: Total  Toileting, which includes using toilet, bedpan or urinal: Total  Taking care of personal grooming such as brushing teeth: Total  Eating Meals: Total  Daily Activity - Total Score: 6           EDUCATION:  Education  Individual(s) Educated: Patient  Education Provided: Fall precautons, Risk and benefits of OT discussed with patient or other  Patient Response to Education:  (Needs reinforcement)      Goals:   Encounter Problems       Encounter Problems (Active)        OT Goals       Patient will complete transfer to chair with mod A. (Progressing)       Start:  07/02/24    Expected End:  07/16/24            Patient will complete grooming with set up. (Progressing)       Start:  07/02/24    Expected End:  07/16/24            Patient will complete bed mobility with min A. (Progressing)       Start:  07/02/24    Expected End:  07/16/24

## 2024-07-02 NOTE — CARE PLAN
The patient's goals for the shift include  to be pain free    The clinical goals for the shift include The patient will remain hemodynamically stable and maintain a HGB greater than 7.0 by 7/2 0700      Problem: Nutrition  Goal: Oral intake greater than 50%  Outcome: Progressing  Goal: Oral intake greater 75%  Outcome: Progressing  Goal: Consume prescribed supplement  Outcome: Progressing  Goal: Adequate PO fluid intake  Outcome: Progressing  Goal: Nutrition support goals are met within 48 hrs  Outcome: Progressing  Goal: Nutrition support is meeting 75% of nutrient needs  Outcome: Progressing  Goal: Tube feed tolerance  Outcome: Progressing  Goal: BG  mg/dL  Outcome: Progressing  Goal: Lab values WNL  Outcome: Progressing  Goal: Electrolytes WNL  Outcome: Progressing  Goal: Promote healing  Outcome: Progressing  Goal: Maintain stable weight  Outcome: Progressing  Goal: Reduce weight from edema/fluid  Outcome: Progressing  Goal: Gradual weight gain  Outcome: Progressing  Goal: Improve ostomy output  Outcome: Progressing     Problem: Diabetes  Goal: Achieve decreasing blood glucose levels by end of shift  Outcome: Progressing  Goal: Increase stability of blood glucose readings by end of shift  Outcome: Progressing  Goal: Decrease in ketones present in urine by end of shift  Outcome: Progressing  Goal: Maintain electrolyte levels within acceptable range throughout shift  Outcome: Progressing  Goal: No changes in neurological exam by end of shift  Outcome: Progressing  Goal: Learn about and adhere to nutrition recommendations by end of shift  Outcome: Progressing  Goal: Vital signs within normal range for age by end of shift  Outcome: Progressing  Goal: Receive DSME education by end of shift  Outcome: Progressing     Problem: Skin  Goal: Decreased wound size/increased tissue granulation at next dressing change  Outcome: Progressing  Goal: Participates in plan/prevention/treatment measures  Outcome: Progressing      Problem: Discharge Planning  Goal: Discharge to home or other facility with appropriate resources  Outcome: Progressing     Problem: Chronic Conditions and Co-morbidities  Goal: Patient's chronic conditions and co-morbidity symptoms are monitored and maintained or improved  Outcome: Progressing     Problem: Indwelling Catheter Maintenance  Goal: I will have no complications from indwelling catheter  Outcome: Progressing     Problem: Safety - Medical Restraint  Goal: Remains free of injury from restraints (Restraint for Interference with Medical Device)  Outcome: Progressing  Goal: Free from restraint(s) (Restraint for Interference with Medical Device)  Outcome: Progressing

## 2024-07-03 ENCOUNTER — APPOINTMENT (OUTPATIENT)
Dept: CARDIOLOGY | Facility: HOSPITAL | Age: 64
DRG: 871 | End: 2024-07-03
Payer: MEDICARE

## 2024-07-03 LAB
ALBUMIN SERPL BCP-MCNC: 2.6 G/DL (ref 3.4–5)
ALP SERPL-CCNC: 125 U/L (ref 33–136)
ALT SERPL W P-5'-P-CCNC: 15 U/L (ref 7–45)
ANION GAP SERPL CALC-SCNC: 10 MMOL/L (ref 10–20)
AST SERPL W P-5'-P-CCNC: 13 U/L (ref 9–39)
BASOPHILS # BLD AUTO: 0.02 X10*3/UL (ref 0–0.1)
BASOPHILS NFR BLD AUTO: 0.2 %
BILIRUB SERPL-MCNC: 0.4 MG/DL (ref 0–1.2)
BUN SERPL-MCNC: 45 MG/DL (ref 6–23)
CALCIUM SERPL-MCNC: 7.8 MG/DL (ref 8.6–10.3)
CHLORIDE SERPL-SCNC: 114 MMOL/L (ref 98–107)
CO2 SERPL-SCNC: 24 MMOL/L (ref 21–32)
CREAT SERPL-MCNC: 1.81 MG/DL (ref 0.5–1.05)
EGFRCR SERPLBLD CKD-EPI 2021: 31 ML/MIN/1.73M*2
EOSINOPHIL # BLD AUTO: 0.01 X10*3/UL (ref 0–0.7)
EOSINOPHIL NFR BLD AUTO: 0.1 %
ERYTHROCYTE [DISTWIDTH] IN BLOOD BY AUTOMATED COUNT: 19.5 % (ref 11.5–14.5)
GLUCOSE BLD MANUAL STRIP-MCNC: 148 MG/DL (ref 74–99)
GLUCOSE BLD MANUAL STRIP-MCNC: 206 MG/DL (ref 74–99)
GLUCOSE BLD MANUAL STRIP-MCNC: 223 MG/DL (ref 74–99)
GLUCOSE BLD MANUAL STRIP-MCNC: 229 MG/DL (ref 74–99)
GLUCOSE BLD MANUAL STRIP-MCNC: 235 MG/DL (ref 74–99)
GLUCOSE BLD MANUAL STRIP-MCNC: 257 MG/DL (ref 74–99)
GLUCOSE SERPL-MCNC: 258 MG/DL (ref 74–99)
HCT VFR BLD AUTO: 25.6 % (ref 36–46)
HGB BLD-MCNC: 7.9 G/DL (ref 12–16)
IMM GRANULOCYTES # BLD AUTO: 0.58 X10*3/UL (ref 0–0.7)
IMM GRANULOCYTES NFR BLD AUTO: 6.8 % (ref 0–0.9)
LYMPHOCYTES # BLD AUTO: 1.27 X10*3/UL (ref 1.2–4.8)
LYMPHOCYTES NFR BLD AUTO: 14.9 %
MAGNESIUM SERPL-MCNC: 2.08 MG/DL (ref 1.6–2.4)
MCH RBC QN AUTO: 29.9 PG (ref 26–34)
MCHC RBC AUTO-ENTMCNC: 30.9 G/DL (ref 32–36)
MCV RBC AUTO: 97 FL (ref 80–100)
MONOCYTES # BLD AUTO: 0.49 X10*3/UL (ref 0.1–1)
MONOCYTES NFR BLD AUTO: 5.8 %
NEUTROPHILS # BLD AUTO: 6.14 X10*3/UL (ref 1.2–7.7)
NEUTROPHILS NFR BLD AUTO: 72.2 %
NRBC BLD-RTO: 2.9 /100 WBCS (ref 0–0)
PHOSPHATE SERPL-MCNC: 2.1 MG/DL (ref 2.5–4.9)
PLATELET # BLD AUTO: 45 X10*3/UL (ref 150–450)
POTASSIUM SERPL-SCNC: 4 MMOL/L (ref 3.5–5.3)
PROT SERPL-MCNC: 4.9 G/DL (ref 6.4–8.2)
RBC # BLD AUTO: 2.64 X10*6/UL (ref 4–5.2)
RBC MORPH BLD: NORMAL
SODIUM SERPL-SCNC: 144 MMOL/L (ref 136–145)
WBC # BLD AUTO: 8.5 X10*3/UL (ref 4.4–11.3)

## 2024-07-03 PROCEDURE — 2500000004 HC RX 250 GENERAL PHARMACY W/ HCPCS (ALT 636 FOR OP/ED): Performed by: NURSE PRACTITIONER

## 2024-07-03 PROCEDURE — 2500000004 HC RX 250 GENERAL PHARMACY W/ HCPCS (ALT 636 FOR OP/ED)

## 2024-07-03 PROCEDURE — 85025 COMPLETE CBC W/AUTO DIFF WBC: CPT | Performed by: EMERGENCY MEDICINE

## 2024-07-03 PROCEDURE — 82947 ASSAY GLUCOSE BLOOD QUANT: CPT

## 2024-07-03 PROCEDURE — 2500000001 HC RX 250 WO HCPCS SELF ADMINISTERED DRUGS (ALT 637 FOR MEDICARE OP): Performed by: EMERGENCY MEDICINE

## 2024-07-03 PROCEDURE — 37799 UNLISTED PX VASCULAR SURGERY: CPT | Performed by: EMERGENCY MEDICINE

## 2024-07-03 PROCEDURE — 93005 ELECTROCARDIOGRAM TRACING: CPT

## 2024-07-03 PROCEDURE — 84100 ASSAY OF PHOSPHORUS: CPT | Performed by: NURSE PRACTITIONER

## 2024-07-03 PROCEDURE — 2500000002 HC RX 250 W HCPCS SELF ADMINISTERED DRUGS (ALT 637 FOR MEDICARE OP, ALT 636 FOR OP/ED): Performed by: NURSE PRACTITIONER

## 2024-07-03 PROCEDURE — 92526 ORAL FUNCTION THERAPY: CPT | Mod: GN

## 2024-07-03 PROCEDURE — 80053 COMPREHEN METABOLIC PANEL: CPT | Performed by: EMERGENCY MEDICINE

## 2024-07-03 PROCEDURE — 2500000004 HC RX 250 GENERAL PHARMACY W/ HCPCS (ALT 636 FOR OP/ED): Performed by: INTERNAL MEDICINE

## 2024-07-03 PROCEDURE — 2500000002 HC RX 250 W HCPCS SELF ADMINISTERED DRUGS (ALT 637 FOR MEDICARE OP, ALT 636 FOR OP/ED): Performed by: EMERGENCY MEDICINE

## 2024-07-03 PROCEDURE — 2500000001 HC RX 250 WO HCPCS SELF ADMINISTERED DRUGS (ALT 637 FOR MEDICARE OP): Performed by: NURSE PRACTITIONER

## 2024-07-03 PROCEDURE — 93010 ELECTROCARDIOGRAM REPORT: CPT | Performed by: INTERNAL MEDICINE

## 2024-07-03 PROCEDURE — C9113 INJ PANTOPRAZOLE SODIUM, VIA: HCPCS | Performed by: NURSE PRACTITIONER

## 2024-07-03 PROCEDURE — 2060000001 HC INTERMEDIATE ICU ROOM DAILY

## 2024-07-03 PROCEDURE — 2500000005 HC RX 250 GENERAL PHARMACY W/O HCPCS: Performed by: EMERGENCY MEDICINE

## 2024-07-03 PROCEDURE — 51701 INSERT BLADDER CATHETER: CPT

## 2024-07-03 PROCEDURE — 83735 ASSAY OF MAGNESIUM: CPT | Performed by: NURSE PRACTITIONER

## 2024-07-03 RX ORDER — INSULIN LISPRO 100 [IU]/ML
0-5 INJECTION, SOLUTION INTRAVENOUS; SUBCUTANEOUS
Status: DISCONTINUED | OUTPATIENT
Start: 2024-07-04 | End: 2024-07-09 | Stop reason: HOSPADM

## 2024-07-03 RX ORDER — SODIUM CHLORIDE, SODIUM LACTATE, POTASSIUM CHLORIDE, CALCIUM CHLORIDE 600; 310; 30; 20 MG/100ML; MG/100ML; MG/100ML; MG/100ML
100 INJECTION, SOLUTION INTRAVENOUS CONTINUOUS
Status: ACTIVE | OUTPATIENT
Start: 2024-07-03 | End: 2024-07-04

## 2024-07-03 ASSESSMENT — PAIN - FUNCTIONAL ASSESSMENT
PAIN_FUNCTIONAL_ASSESSMENT: 0-10

## 2024-07-03 ASSESSMENT — COGNITIVE AND FUNCTIONAL STATUS - GENERAL
MOVING FROM LYING ON BACK TO SITTING ON SIDE OF FLAT BED WITH BEDRAILS: TOTAL
EATING MEALS: TOTAL
STANDING UP FROM CHAIR USING ARMS: TOTAL
HELP NEEDED FOR BATHING: TOTAL
CLIMB 3 TO 5 STEPS WITH RAILING: TOTAL
MOVING TO AND FROM BED TO CHAIR: TOTAL
TURNING FROM BACK TO SIDE WHILE IN FLAT BAD: TOTAL
TOILETING: TOTAL
WALKING IN HOSPITAL ROOM: TOTAL
PERSONAL GROOMING: TOTAL
HELP NEEDED FOR BATHING: TOTAL
MOVING TO AND FROM BED TO CHAIR: TOTAL
TOILETING: TOTAL
DRESSING REGULAR LOWER BODY CLOTHING: TOTAL
EATING MEALS: TOTAL
DAILY ACTIVITIY SCORE: 6
MOBILITY SCORE: 6
STANDING UP FROM CHAIR USING ARMS: TOTAL
MOVING FROM LYING ON BACK TO SITTING ON SIDE OF FLAT BED WITH BEDRAILS: TOTAL
CLIMB 3 TO 5 STEPS WITH RAILING: TOTAL
DAILY ACTIVITIY SCORE: 6
PERSONAL GROOMING: TOTAL
DRESSING REGULAR LOWER BODY CLOTHING: TOTAL
DRESSING REGULAR UPPER BODY CLOTHING: TOTAL
TURNING FROM BACK TO SIDE WHILE IN FLAT BAD: TOTAL
MOBILITY SCORE: 6
DRESSING REGULAR UPPER BODY CLOTHING: TOTAL
WALKING IN HOSPITAL ROOM: TOTAL

## 2024-07-03 ASSESSMENT — PAIN SCALES - GENERAL
PAINLEVEL_OUTOF10: 0 - NO PAIN

## 2024-07-03 NOTE — PROGRESS NOTES
Critical Care Daily Progress        Subjective   Patient is a 63 y.o. female admitted on 6/28/2024  9:42 AM with the following indication(s) for ICU care sepsis 2/2 UTI/ Pneumonia with adrenal insuffiencey. Patient is awake and conversant.    Overnight Events: no complaint    Complaints:     Scheduled Medications:   amoxicillin-pot clavulanate, 1 tablet, nasoduodenal tube, q12h GERALD  atorvastatin, 40 mg, nasogastric tube, Nightly  folic acid, 1 mg, nasogastric tube, Daily  heparin, 5,000 Units, subcutaneous, q8h  hydrocortisone sodium succinate, 25 mg, intravenous, q8h  insulin lispro, 0-5 Units, subcutaneous, q4h  levETIRAcetam, 500 mg, nasogastric tube, q12h  melatonin, 5 mg, nasogastric tube, Nightly  metoprolol tartrate, 12.5 mg, nasoduodenal tube, BID  multivitamin with minerals, 1 tablet, nasogastric tube, Daily  pantoprazole, 40 mg, oral, Daily before breakfast   Or  pantoprazole, 40 mg, intravenous, Daily before breakfast  risperiDONE, 1 mg, nasoduodenal tube, BID  sertraline, 25 mg, nasogastric tube, Daily  thiamine, 100 mg, nasogastric tube, Daily         Continuous Medications:         PRN Medications:   PRN medications: dextrose, dextrose, glucagon, glucagon, magnesium sulfate, magnesium sulfate, OLANZapine, oxygen, potassium chloride    Objective   Vitals:  Temp  Min: 36.2 °C (97.2 °F)  Max: 37 °C (98.6 °F)  Pulse  Min: 92  Max: 120  BP  Min: 96/62  Max: 114/90  Resp  Min: 14  Max: 33  SpO2  Min: 92 %  Max: 100 %    Physical Exam:   Physical Exam   Physical Exam            Assessment/Plan     Principal Problem:    Pancytopenia (Multi)     Patient is a 63 y.o. 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   Neuro:     Metabolic  Encephalopathy 2/2 adrenal insufficiency vs sepsis-mental status improving dramatically over past 24h  -History: Multiple admissions for AMS found to be in adrenal insufficiency, lupus cerebritis   -Home meds: Zoloft, Keppra 500 mg twice daily, meclizine, paliperidone  -[facility] ICU  - CT head negative   -Continue Keppra 500mg IV BID  -Zyprexa 5mg q6 prn for agitation   Cardiovascular: Septic Shock refractory to IVF 2/2 PNA vs UTI, elevated troponin with ST depressions likely 2/2 demand ischemia in setting of acute anemia  - History: afib (eliquis), bradycardia 2/2 sinus node dysfunction s/p pacemaker (5/17/2024), CAD s/p CABG 2013, HFmREF (EF 40-45% 5/2024)  - Home meds: Atorvastatin, cont metoprolol at half home dose  - Last echo: 40-45% 5/2024  -Hemodynamically stable    Pulmonary: AHRF 2/2 CAP, resolved  -History: COPD (no PFTs)  -Home meds: Albuterol  Continuous pulse oximetry   O2 PRN to maintain SpO2 > 94%, wean as tolerated  -Imaging: CTA New right lower lobe nodular opacities, likely infectious/inflammatory.  Follow-up 3 to 6 months recommended. DC zosyn and start augmentin  -Duonebs q6         Gastrointestinal:  no complaint  Results from last 7 days   Lab Units 07/03/24  0413 07/02/24  0346 07/01/24  0357 06/30/24  0439   ALK PHOS U/L 125 114 127 109   AST U/L 13 14 20 19   ALT U/L 15 17 21 19       -History: GERD  -Home meds: pantoprazole  - Prophylaxis: protonix  - Bowel regimen: none    Renal:  JED on CKD3, likely pre-renal in setting of decreased PO intake +/- ischemia ATN. Acute on chronic hypernatremia,   - Daily RFP,Mg,Phos  - History: CKD3 (bl Cr 1.5-1.9)  - Home meds: Lasix 40; remains on hold  - [place] with strict I/O  - Avoid nephrotoxic agents  -Hypernatremia - Increase water flushes to 250ml q4h. Repeat RFP this afternoon. If no improvement, start D5W. Free water deficit calculated 2.8L  - Electrolytes: Replete per protocol, goal K>4 Phos >3 Mg >2  Results from last 7 days   Lab Units  07/03/24  0413 07/02/24  1606 07/02/24  0346 07/01/24  0357 06/30/24  0439   SODIUM mmol/L 144 148* 149* 144 145   POTASSIUM mmol/L 4.0 4.2 3.6 4.1 4.1   MAGNESIUM mg/dL 2.08  --  1.83 1.99 2.10   PHOSPHORUS mg/dL 2.1* 2.3* 2.6 4.2 3.7   BUN mg/dL 45* 42* 41* 37* 37*   CREATININE mg/dL 1.81* 1.88* 2.07* 2.28* 2.40*           Endocrine: Adrenal Insufficiency in setting of infection  -History: recurrent adrenal insufficiency (hydrocortisone), SLE cerebritis and Jaccoud arthropathy on florinef  -Home meds: Lantus 10 units in a.m., sliding scale, Cortef 20 mg in a.m./20 mg p.m., flu cortisone 0.1 mg every other day  - sliding scale: Mild, hold insulin if BS <250  - Endocrinology consulted  -Solucortef decreased to 25mg q8h  - Hypoglycemia protocol. Goal glucose 200s to avoid hypoglycemia  Results from last 7 days   Lab Units 07/03/24  1158 07/03/24  0755 07/03/24  0413 07/03/24  0355 07/03/24  0003 07/02/24  1948 07/02/24  1606 07/02/24  1601 06/28/24  1405 06/28/24  0958   POCT GLUCOSE mg/dL 229* 235*  --  223* 257* 230*  --  240*   < >  --    GLUCOSE mg/dL  --   --  258*  --   --   --  255*  --    < > 126*   TSH mIU/L  --   --   --   --   --   --   --   --   --  6.46*    < > = values in this interval not displayed.       Hematology: Acute on chronic anemia of chronic disease, chronic pancytopenia   - Daily CBC, coags  -History: Pancytopenia   -Home meds: Eliquis 2.5 mg twice daily on hold.  H&H 6.8/21.8 today.    Results from last 7 days   Lab Units 07/03/24  0413 07/02/24  0346 07/01/24  0357 06/30/24  1343 06/30/24  0637 06/30/24  0439   HEMOGLOBIN g/dL 7.9* 6.8* 7.7* 7.3*   < > 6.3*   HEMATOCRIT % 25.6* 21.8* 24.3* 23.5*   < > 20.5*   PLATELETS AUTO x10*3/uL 45* 43* 54*  --   --  58*    < > = values in this interval not displayed.       Infectious Disease: Sepsis 2/2 PNA vs UTI   -History: -History: Recently treated with rocephin for lower leg wound infection at [facility]   -MRSA negative  -Abx: Vancomycin  (-), Cefepime (-), Zosyn (-)  -DC zosyn and continue Augmentin   -Cultures:               Blood Culture : No growth              Urine Culture : No growth   Results from last 7 days   Lab Units 24  0413 24  0346 24  0357 24  0439   WBC AUTO x10*3/uL 8.5 6.9 6.7 6.5     Temp (24hrs), Av.6 °C (97.8 °F), Min:36.2 °C (97.2 °F), Max:37 °C (98.6 °F)        Musculoskeletal  - PT to see   - OT to see    LDA:  External Urinary Catheter (Active)   Placement Date/Time: 24 0800     Number of days: 0         CODE STATUS: DNR and No Intubation    Disposition: Transfer to CCU.    RESTRAINTS: type:     ABCDEF Checklist  Analgesia: Spontaneous awakening trial to be pursued if clinically appropriate. RASS goal reviewed  Breathing: Spontaneous breathing trial to be pursued if clinically appropriate. Mechanical power of assisted ventilation reviewed  Choice of analgesia/sedation: Analgesic and sedative agents adjusted per clinical context.   Delirium assessed by CAM, will avoid exacerbating factors  Early mobility and exercise: Physical and occupational therapy engaged  Family: Plan of care, overall trajectory of patient shared with family. Questions elicited and answered as appropriate.     Due to the high probability of life threatening clinical decompensation, the patient required critical care time evaluating and managing this patient.  Critical care time included obtaining a history, examining the patient, ordering and reviewing studies, discussing, developing, and implementing a management plan, evaluating the patient's response to treatment, and discussion with other care team providers. I saw and evaluated the patient myself.  Critical care time was performed exclusive of billable procedures.    Critical care time:     Case discussed with attending , Dr. Brendon Sage MD

## 2024-07-03 NOTE — NURSING NOTE
Pt more alert today, continues to have hallucinations. She removed her Corpak. Speech therapy consulted to assist with diet recommend Pureed solids and this liquids. Pts son is agreeable to outpatient palliative care services through [facility]. Referral placed, they will follow up with pt closer to discharge.    No further palliative needs, will sign off

## 2024-07-03 NOTE — CARE PLAN
Problem: Nutrition  Goal: Oral intake greater than 50%  Outcome: Progressing  Goal: Oral intake greater 75%  Outcome: Progressing  Goal: Consume prescribed supplement  Outcome: Progressing  Goal: Adequate PO fluid intake  Outcome: Progressing  Goal: Nutrition support goals are met within 48 hrs  Outcome: Progressing  Goal: Nutrition support is meeting 75% of nutrient needs  Outcome: Progressing  Goal: Tube feed tolerance  Outcome: Progressing  Goal: BG  mg/dL  Outcome: Progressing  Goal: Lab values WNL  Outcome: Progressing  Goal: Electrolytes WNL  Outcome: Progressing  Goal: Promote healing  Outcome: Progressing  Goal: Maintain stable weight  Outcome: Progressing  Goal: Reduce weight from edema/fluid  Outcome: Progressing  Goal: Gradual weight gain  Outcome: Progressing  Goal: Improve ostomy output  Outcome: Progressing     Problem: Diabetes  Goal: Achieve decreasing blood glucose levels by end of shift  Outcome: Progressing  Goal: Increase stability of blood glucose readings by end of shift  Outcome: Progressing  Goal: Decrease in ketones present in urine by end of shift  Outcome: Progressing  Goal: Maintain electrolyte levels within acceptable range throughout shift  Outcome: Progressing  Goal: No changes in neurological exam by end of shift  Outcome: Progressing  Goal: Learn about and adhere to nutrition recommendations by end of shift  Outcome: Progressing  Goal: Vital signs within normal range for age by end of shift  Outcome: Progressing  Goal: Receive DSME education by end of shift  Outcome: Progressing     Problem: Skin  Goal: Decreased wound size/increased tissue granulation at next dressing change  Outcome: Progressing  Flowsheets (Taken 7/2/2024 2323)  Decreased wound size/increased tissue granulation at next dressing change: Protective dressings over bony prominences  Goal: Participates in plan/prevention/treatment measures  Outcome: Progressing  Flowsheets (Taken 7/2/2024 2323)  Participates in  plan/prevention/treatment measures: Elevate heels     Problem: Discharge Planning  Goal: Discharge to home or other facility with appropriate resources  Outcome: Progressing     Problem: Chronic Conditions and Co-morbidities  Goal: Patient's chronic conditions and co-morbidity symptoms are monitored and maintained or improved  Outcome: Progressing   The patient's goals for the shift include      The clinical goals for the shift include to remain HDS.

## 2024-07-03 NOTE — CARE PLAN
The patient's goals for the shift include        Problem: Nutrition  Goal: Oral intake greater than 50%  Outcome: Progressing  Goal: Oral intake greater 75%  Outcome: Progressing  Goal: Consume prescribed supplement  Outcome: Progressing  Goal: Adequate PO fluid intake  Outcome: Progressing  Goal: Nutrition support goals are met within 48 hrs  Outcome: Progressing  Goal: Nutrition support is meeting 75% of nutrient needs  Outcome: Progressing  Goal: Tube feed tolerance  Outcome: Progressing  Goal: BG  mg/dL  Outcome: Progressing  Goal: Lab values WNL  Outcome: Progressing  Goal: Electrolytes WNL  Outcome: Progressing  Goal: Promote healing  Outcome: Progressing  Goal: Maintain stable weight  Outcome: Progressing  Goal: Reduce weight from edema/fluid  Outcome: Progressing  Goal: Gradual weight gain  Outcome: Progressing  Goal: Improve ostomy output  Outcome: Progressing     Problem: Diabetes  Goal: Achieve decreasing blood glucose levels by end of shift  Outcome: Progressing  Goal: Increase stability of blood glucose readings by end of shift  Outcome: Progressing  Goal: Decrease in ketones present in urine by end of shift  Outcome: Progressing  Goal: Maintain electrolyte levels within acceptable range throughout shift  Outcome: Progressing  Goal: No changes in neurological exam by end of shift  Outcome: Progressing  Goal: Learn about and adhere to nutrition recommendations by end of shift  Outcome: Progressing  Goal: Vital signs within normal range for age by end of shift  Outcome: Progressing  Goal: Receive DSME education by end of shift  Outcome: Progressing     Problem: Skin  Goal: Decreased wound size/increased tissue granulation at next dressing change  Outcome: Progressing  Goal: Participates in plan/prevention/treatment measures  Outcome: Progressing     Problem: Discharge Planning  Goal: Discharge to home or other facility with appropriate resources  Outcome: Progressing     Problem: Chronic Conditions  and Co-morbidities  Goal: Patient's chronic conditions and co-morbidity symptoms are monitored and maintained or improved  Outcome: Progressing

## 2024-07-03 NOTE — PROGRESS NOTES
"Speech-Language Pathology    SLP Adult Inpatient Speech-Language Pathology Clinical Swallow Evaluation    Patient Name: Bing Holliday  MRN: [id number]  Today's Date: 7/2/2024  Time Calculation  Start Time: 1716  Stop Time: 1727  Time Calculation (min): 11 min         Current Problem:   1. Pancytopenia (Multi)  Central Line    Central Line      2. Septic shock (Multi)  Central Line    Central Line      3. Hypothermia, initial encounter        4. Acute kidney injury superimposed on CKD (CMS-HCC)        5. Altered mental status, unspecified altered mental status type           Recommendations:  Diet: NPO with alternate means nutrition/hydration  Medication Administration: Non oral  Strategies: Sit upright (30-45 degrees) at all times; frequent, aggressive oral care to improve infection control as well as reduce dental plaque and bacteria on oropharyngeal surfaces (which may increase the risk nosocomial infections, including pneumonia)  Swallow Reassessment    Assessment:  Consistencies Trialed: Thin Liquids (single ice chips x3 only)   Oral motor exam: Labial ROM was WFL, whereas labial strength was decreased. Buccal strength/ROM was also decreased. Lingual ROM was WFL, although pt with slightly decreased lingual strength. Unable to assess volitional cough/swallow-pt did not understand directives for same (vs inability to execute same).    Pt with moderately impaired oral dysphagia, characterized by decreased bolus manipulation/mastication/A-P propulsion and min anterior spillage L. She was to functionally clear the remainder.  Pt with suspect pharyngeal dysphagia, characterized by weak coughing following one out of 3 single boluses ice chips. Pt endorsed having \"choked a little bit\" on one of the ice chips. PO presentation was discontinued at this time.  Pt oriented to person and place, but not month/year/specific date.   Due to overt S/S pharyngeal dysphagia, pt is considered to be at risk for aspiration.   Per the " "results of this assessment, patient is not appropriate for PO at this time. Please note above recommendations and precautions. ST to continue to follow patient during inpatient stay.     Plan:  Skilled speech therapy for dysphagia treatment is warranted in order to provide training and instruction regarding the use of compensatory swallow strategies and pt/caregiver education in order to reduce risk of aspiration, dehydration and malnutrition.  Frequency: 3x per week  Duration: 2 weeks   SLP Discharge Recommendations: To be determined.     Goals:  1. Patient will tolerate baseline diet with no overt s/s of aspiration in 90% of observed therapeutic trials.  2. Patient will implement safe swallowing strategies to reduce risk of aspiration in 90% of trials given caregiver assistance/cueing as needed.  3. Pending appropriateness for same, patient will complete a modified barium swallow study (MBSS) for objective assessment of swallowing function, to assess for aspiration, and to guide further recommendations and treatment plan.                Subjective:  Pt seen at bedside for clinical swallow evaluation, and was alert.pleasant and cooperative. She was assisted into an upright position. Vocal quality was hypophonic.         Baseline Assessment:  O2 use: 2L via NC     General Visit Information:  Clinical Swallow Evaluation ordered d/t concern for dysphagia. Current diet level is NPO/Corpak.   AMINAH Cardona reports pt has been sleepy, and orientation is at baseline.     History Of Present Illness:  Per EMR, \"Patient is a 63 y.o. 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 Outpatient labs " "showed a creatinine of 2.22 with a baseline of 1.5.  Recently admitted to [facility] for altered mental status 2/2 adrenal insufficiency in the setting of lower extremity wound infection treated with Rocephin returned to nursing facility on 6/17 where it was reported that she was improving in terms of weakness and mental status standpoint; however quickly regressed and became weaker - more difficult to lift/assist out of bed, softer spoken voice, and more somnolent and confused.   -Imaging: CTA New right lower lobe nodular opacities, likely infectious/inflammatory.\"    Pain:  Rating scale: 0-10   Pt report: 0     Education:   Learner pt; RN   Barriers to Learning Acuity of illness; cognitive communication limitations  Method demonstration; verbal  Education-Topic SLP provided patient/family education regarding role of SLP, purpose of assessment and clinical impressions/recommendations. Patient verbalized questionable full comprehension, consistent with cognitive status. SLP further coordinated with RN regarding recommendations and precautions per this assessment, with RN verbalizing understanding.  Outcome Needs review/reinforcement              "

## 2024-07-03 NOTE — SIGNIFICANT EVENT
ICU Downgrade Note:  Bing Holliday is a 63 y.o. 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 for altered mental status and abnormal lab values. Outpatient labs showed a creatinine of 2.22 with a baseline of 1.5. She was recently admitted to [facility] on 06/04/2024 - 06/17/2024 for altered mental status 2/2 adrenal insufficiency in the setting of lower extremity wound infection treated with Rocephin. She returned to nursing facility on 06/17 where it was reported that she was improving in terms of weakness and mental status standpoint; however quickly regressed and became weaker with more difficulty to lift/assist out of bed, softer spoken voice, and more somnolent and confused.     In the ED, patient presented hypothermic with a temperature of 90.1, hypotensive with a blood pressure of 77/49, however was saturating well on room air. Labs were significant for a creatinine of 2.03, magnesium 1.49, troponin 4, lactate 1.2, sodium 149, chloride 114, hemoglobin 7.5, platelets 57. ABG was done and showed pH of 7.34 and CO2 of 53.  UA showed 6-10 WBC with mild leuk esterase. CT head was negative, CT chest abdomen pelvis with contrast showed a possible pneumonia. Blood cultures were taken as well as urine cultures.  She was found to be in septic shock and was empirically administered Vancomycin, Zosyn, and Metronidazole, as well as given a sepsis bolus, with right central line placed, and levophed. She was admitted to ICU service for further management of her metabolic encephalopathy 2/2 multifactorial sepsis with adrenal insufficiency.    Initially, in the ICU patient was AOx0 with GCS 10. NG tube and ruano catheter  "was placed. Patient eventually became agitated overnight consistent with delirium, zyprexa ordered was insufficient for controlling delirium. Subsequently placed on precedex which controlled agitation. Hbg was also low, a unit of blood was administered and pressors were able to be weaned off on 06/30 and precedex was stopped the next morning. Central lines and urinary catheter were removed. NGT placed for feeds/meds, which was removed after two days after patient able to pass barium swallow testing. Antibiotics deescalated to Augmentin. Nephrology, endocrinology, and palliative care services were consulted for the patient during her ICU course as well given her clinical picture and history of chronic autoimmune issues.     Today, patient denies any pain. States she feels \"disgusted\" that she's at the hospital again. Currently does not have an appetite. Able to answer questions appropriately at times, however would quickly become disoriented. Reports that she has been having loose stool without blood, able to urinate regularly without dysuria or hematuria.       Physical Exam  Constitutional:       General: Not in acute distress.     Appearance: Appears ill, fatigued.   HENT:      General: crusting around nasal area and mouth. Mucous membranes appear dry.  Eyes:      General: No scleral icterus.     Extraocular Movements: Extraocular movements intact.   Cardiovascular:      Rate and Rhythm: Normal rate and regular rhythm.      Pulses: Normal pulses.      Heart sounds: No murmur heard.     No friction rub. No gallop.   Pulmonary:      Effort: No respiratory distress. Saturating well on room air.      Breath sounds: No wheezing, stridor, or rales.   Abdominal:      General: Abdomen is flat.      Palpations: Abdomen is soft.      Tenderness: There is no abdominal tenderness. There is no guarding.   Musculoskeletal:         General: No swelling or tenderness.   Skin:     General: Skin is warm and dry.   Neurological:     " " General: No focal deficit present.      Mental Status: alert and oriented to person, place, and time. Intermittently able to answer questions appropriately, however at times will ramble about things that are currently happening.      Motor: No weakness. Able to move all extremities.   Psychiatric:         Mood: Mood is \"disgusted\".       Assessment and Plan   Patient is a 63 y.o. 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 The patient has been deemed stable for downgrade from the ICU after resolution of her septic shock and now hemodynamically stable. General medicine to assume care 24 hours after transfer order placed or after the patient physically leaves the ICU.    # Septic shock secondary to pneumonia, resolved  # Acute hypoxic respiratory failure secondary to CAP vs. Aspiration PNA  -Off pressors since 0600 on 6/30  -MRSA negative  -Blood cultures 6/28: No growth  -Urine culture 6/28: No growth  -Vancomycin (6/28-6/29), cefepime 6/28-6/28), Zosyn 6/29-7/1)  -Augmentin (7/2-).  Patient has received 6 days of antibiotics.  Anticipate discontinuation of antibiotics tomorrow after total of 7 days  -Now on room air.  Maintain SpO2 greater than 92%  -Continue DuoNebs     #Metabolic Encephalopathy 2/2 adrenal insufficiency versus septic shock, improved  -History: Multiple admissions for AMS found to be in adrenal insufficiency, lupus cerebritis   -Home meds: Zoloft, Keppra 500 mg twice daily, meclizine, paliperidone  -CT head negative   -Continue risperidone, alternative for home paliperidone  -CT head negative   -Continue Keppra 500mg BID  -Zyprexa 5mg q6 prn for agitation      #Elevated troponin with ST depressions likely 2/2 demand ischemia in " setting of acute anemia and septic shock  # History of CAD status post CABG 2013  # History of HFmr EF (EF 40 to 45% 5/2024)  # History of atrial fibrillation on Eliquis  -Troponins 14, 18, 75, 64  -Continue atorvastatin 40 mg  -Patient currently on metoprolol 12.5 mg twice daily due to recent septic shock.  Home dose is 25 mg.  Blood pressure is now improved.  May consider resuming home dose tomorrow  -Home Eliquis held, suspect this was in the setting of acute on chronic anemia.  May need to discuss with cardiology regarding resumption of home Eliquis for her atrial fibrillation. May need to consider atrial appendage closure.    #JED on CKD3, likely pre-renal in setting of decreased PO intake +/- ischemia ATN.   #Acute on chronic hypernatremia, resolved  - History: CKD3 (bl Cr 1.5-1.9)  -Cr 1.81 today (down from 2.03 in the ED on admission)  -Daily RFP,Mg,Phos  -Electrolytes: Replete per protocol, goal K>4 Phos >3 Mg >2   -May consider resuming home Lasix prior to discharge now that JED has resolved and patient is at her baseline creatinine level    #Adrenal Insufficiency in setting of infection  # Hypoglycemia  # Uncontrolled type 2 diabetes  -History: recurrent adrenal insufficiency (hydrocortisone), SLE cerebritis and Jaccoud arthropathy on florinef  -Home meds: Lantus 10 units in a.m., sliding scale, Cortef 20 mg in a.m./20 mg p.m., flu cortisone 0.1 mg every other day  -sliding scale: SSI #1.  Per endocrinology patient is okay running in the 200s.  Sliding scale instructions adjusted.  Nursing communication order was placed by endocrinology to hold insulin for BGL less than 250.  -Endocrinology recommended hydrocortisone 25mg q8h.  Per endocrinology neurology note, will resume prednisone once patient's clinical status improves    #Acute on chronic anemia of chronic disease, chronic pancytopenia   -History: Pancytopenia   -Home meds: Eliquis 2.5 mg twice daily on hold.   -Worsening thrombocytopenia-chronic  versus sepsis versus Zosyn.  Will monitor.  -3 units of packed red blood cells since admission  -Daily CBC, coags  -DVT Prophylaxis: Heparin subcutaneous 5000U q8h on hold secondary to platelets less than 50    #Chronic conditions  #GERD  #Osteoporosis  -Protonix 40mg  -Continue home medications as tolerated      Global Plan of Care:   Fluids: LR  Electrolytes: replete PRN  Antibiotics: Augmentin  Lines/Access: None  Diet: Potassium restricted, sodium restricted  Consults: Nutrition, PT/OT, SLP, Palliative  DVT PPX: Held  Code status: DNR and No Intubation  Emergency contact: López Lang (son) [phone] (Home Phone)     Dispo: Appears to be clinically improving. Anticipate 1-2 additional midnights, with likely discharge back to SNF.      Case to be staffed with attending physician, Dr. Yolanda Moreno.         Tsering Dwyer, MS4    I saw this patient with the above medical student and performed my own History and Physical Examination.   My Subjective and Objective findings are reflected in the above documentation.  The Assessment and Plan reflect my input. My Edits are included in the above documentation.    Bing Alarcon, DO  Internal Medicine PGY3

## 2024-07-03 NOTE — PROGRESS NOTES
Spiritual Care Visit    Clinical Encounter Type  Visited With: Patient  Routine Visit: Introduction          I stopped in to visit with patient.  Patient was not talking, but was making strong facial expressions with her eyes.  I spoke encouraging words to her and left.

## 2024-07-03 NOTE — HOSPITAL COURSE
History of Present Illness:  Bing Holliday is a 63 y.o. 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 for altered mental status and apparent abnormal lab values. Outpatient labs showed a creatinine of 2.22 with a baseline of 1.5. She was recently admitted to [facility] on 06/04/2024 - 06/17/2024 for altered mental status 2/2 adrenal insufficiency in the setting of lower extremity wound infection treated with Rocephin. She returned to nursing facility on 6/17 where it was reported that she was improving in terms of weakness and mental status standpoint; however quickly regressed and became weaker with more difficulty to lift/assist out of bed, softer spoken voice, and more somnolent and confused.       ED Course:  V/S: BP 91/60, HR 63, RR 15, O2 96% on RA, Temp 90.1F  Labs: Leuks 4.0, Hb 7.5, Hematocrit 25.3, , PLTs 57. , Na 149, K 4, Cl 114, Bicarb 25, AG 14, BUN 44, Cr 2.03, Ca 8.4, Albumin 3.1, Alk phos 141, AST/ALT NL, Mg 1.49, Trop 14 > 18, TSH 6.46, free T4 1.03.   UA showed 1+ protein, 75 leukocyte esterase, 6-10 WBC, otherwise NL.   Blood gas study: venous pH 7.34, pCO2 53, pO2 34, venous AG 5.0.   EKG: atrial paced rhythm with prolonged AV conduction. Qtc 440 ms.   Imaging: CT head no acute findings. CT AP no acute findings.   Interventions: Vanc, Zosyn, Metronidazole started empirically. Sepsis bolus, central line placed, levophed administered. Montano catheter with temperature probe.   Patient was found to be septic and admitted to ICU service for further management of her metabolic encephalopathy 2/2 multifactorial sepsis with adrenal insufficiency.       Hospital Course: 06/28/2024 -  ***  Patient was admitted to ICU service for further management of her metabolic encephalopathy 2/2 multifactorial sepsis with adrenal insufficiency. In the ICU,  AOx0 with GCS 10. She needed arterial line placement due to increasing pressor need, as well as NG tube and ruano catheter. During her course, she became agitated needing zyprexa and eventually escalation to precedex. Hbg was also low, she received a total of 3 units of blood. Able to be weaned off on 06/30 and precedex the next morning. Central lines and urinary catheter were removed. NGT placed for feeds/meds, which was removed after two days after patient able to pass barium swallow testing. Antibiotics deescalated to Augmentin. Nephrology, endocrinology, and palliative care services were consulted for the patient during her ICU course as well given her clinical picture and history of chronic autoimmune issues.     On 07/03/2024 she was transferred to CCU in hemodynamically stable condition. She was restarted on her home dose of metoprolol. It appears that her new baseline mental status may be AOx3 with intermittent hallucinations or confusion. Still on IV steroids with goal to transition back to PO before home going with help from endocrinology. Cardiology service consulted for recommendations on home Eliquis for atrial fibrillation in the setting of her acute on chronic thrombocytopenia as well as atrial appendage closure. Patient and family amendable to outpatient palliative service through [facility].

## 2024-07-03 NOTE — PROGRESS NOTES
Speech-Language Pathology  SLP Adult Inpatient  Speech-Language Pathology Treatment       Patient Name: Bing Holliday  MRN: [id number]  Today's Date: 7/3/2024  Time Calculation  Start Time: 1234  Stop Time: 1258  Time Calculation (min): 24 min        Recommendations:  Diet: Pureed solids (level 4)  Liquids: Thin (level 0)  Medications: Crushed in a pureed carrier  Compensatory Strategies/Aspiration Precautions: 1:1 assist/supervision, alert, upright at 90 degrees during PO intake + 20-30 minutes following meals, small/single sips + bites, slow rate, check for pocketing/oral clearance, alternate consistencies, allow extra time to swallow, cue to swallow as needed  Please notify the SLP team with any concerns/issues/changes regarding patient's PO intake/aspiration      Plan:  Inpatient/Swing Bed or Outpatient: Inpatient  Treatment/Interventions: Dysphagia management  SLP TX Plan: Continue Plan of Care  SLP Plan: Skilled SLP  SLP Frequency: 3x per week  Duration: 2 weeks  SLP Discharge Recommendations: Continue skilled SLP services at the next level of care  Next Treatment Priority: Ongoing assessment  Discussed POC: Patient, Nursing  Discussed Risks/Benefits: Yes, Patient, Nursing  Patient/Caregiver Agreeable: Yes  GOALS (established following the bedside swallow evaluation on 7/2):  1. Patient will tolerate baseline diet with no overt s/s of aspiration in 90% of observed therapeutic trials.  -Ongoing; cautiously initiate pureed solids and thin liquids with aspiration precautions  2. Patient will implement safe swallowing strategies to reduce risk of aspiration in 90% of trials given caregiver assistance/cueing as needed.  -Ongoing; required ongoing cues/assist  3. Pending appropriateness for same, patient will complete a modified barium swallow study (MBSS) for objective assessment of swallowing function, to assess for aspiration, and to guide further recommendations and treatment plan.  -Not addressed this  "date      Current Problem per MD on 7/2:  -Metabolic Encephalopathy 2/2 adrenal insufficiency vs sepsis-mental status improving dramatically over past 24h   -Septic Shock refractory to IVF 2/2 PNA vs UTI, elevated troponin with ST depressions likely 2/2 demand ischemia in setting of acute anemia   -AHRF 2/2 CAP, resolved   -JED on CKD3, likely pre-renal in setting of decreased PO intake +/- ischemia ATN. Acute on chronic hypernatremia   -Adrenal Insufficiency in setting of infection   -Acute on chronic anemia of chronic disease, chronic pancytopenia   -Sepsis 2/2 PNA vs UTI   Please see MD reports for detailed/additional information      Imaging:  CXR (6/28)- \"1. Stable cardiomegaly and/or pericardial effusion.  2. Life-support devices positions as above.\"        Subjective   Patient with confusion. RN reported that patient is delirious.   Patient was pleasant and cooperative for this re-assessment.   Note, patient pulled out her NGT this morning per RN report.       Most Recent Visit:  Bedside swallow evaluation on 7/2/24.       General Visit Information:  Patient Seen During This Visit: Yes  Prior to Session Communication: Bedside nurse      Pain Assessment:  Pain Assessment: 0-10  0-10 (Numeric) Pain Score: 0 - No pain      Therapeutic Swallow/Objective/Clinical Observations:  Therapeutic Swallow Intervention : Compensatory Strategies, PO Trials, Caregiver Education  Solid Diet Recommendations: Pureed/extremely thick  (IDDSI Level 4)  Liquid Diet Recommendations: Thin (IDDSI Level 0)  Patient consumed thin liquids, pureed solids, and soft/moistened solids for this re-assessment.   Oral: Adequate prehension without labial escape. Bolus holding (at times) and prolonged oral transit/manipulation appreciated. Mastication and bolus formation was prolonged/reduced in nature, more consistent with anterior mashing/munching (not efficient). Patient eventually cleared the bolus via cued additional swallows and liquid " swishes/assists.   Pharyngeal: No overt clinical s/s of aspiration noted. Swallow onset appeared delayed at times versus a prolonged oral phase. Laryngeal elevation was visually appreciated.    Patient's vocals and respirations remained dry/clear during this assessment.   Her RR/WOB did not appear to increase with PO intake.   Patient denied odynphagia, a globus sensation, and GERD at this time.       Inpatient Education:  Patient and RN were educated on the above. Patient will require ongoing education. Note, family was not present.        Thank you!

## 2024-07-04 PROBLEM — M19.91 LOCALIZED, PRIMARY OSTEOARTHRITIS: Status: RESOLVED | Noted: 2024-02-14 | Resolved: 2024-07-04

## 2024-07-04 PROBLEM — R65.21 SEPTIC SHOCK (MULTI): Status: ACTIVE | Noted: 2023-11-30

## 2024-07-04 PROBLEM — M25.561 ARTHRALGIA OF RIGHT KNEE: Status: RESOLVED | Noted: 2024-02-14 | Resolved: 2024-07-04

## 2024-07-04 PROBLEM — A41.9 SEPTIC SHOCK (MULTI): Status: ACTIVE | Noted: 2023-11-30

## 2024-07-04 LAB
ACANTHOCYTES BLD QL SMEAR: ABNORMAL
ALBUMIN SERPL BCP-MCNC: 2.6 G/DL (ref 3.4–5)
ALP SERPL-CCNC: 105 U/L (ref 33–136)
ALT SERPL W P-5'-P-CCNC: 14 U/L (ref 7–45)
ANION GAP SERPL CALC-SCNC: 11 MMOL/L (ref 10–20)
AST SERPL W P-5'-P-CCNC: 14 U/L (ref 9–39)
BASOPHILS # BLD MANUAL: 0 X10*3/UL (ref 0–0.1)
BASOPHILS NFR BLD MANUAL: 0 %
BILIRUB SERPL-MCNC: 0.5 MG/DL (ref 0–1.2)
BUN SERPL-MCNC: 41 MG/DL (ref 6–23)
CALCIUM SERPL-MCNC: 7.8 MG/DL (ref 8.6–10.3)
CHLORIDE SERPL-SCNC: 114 MMOL/L (ref 98–107)
CO2 SERPL-SCNC: 22 MMOL/L (ref 21–32)
CREAT SERPL-MCNC: 1.66 MG/DL (ref 0.5–1.05)
DACRYOCYTES BLD QL SMEAR: ABNORMAL
EGFRCR SERPLBLD CKD-EPI 2021: 35 ML/MIN/1.73M*2
EOSINOPHIL # BLD MANUAL: 0 X10*3/UL (ref 0–0.7)
EOSINOPHIL NFR BLD MANUAL: 0 %
ERYTHROCYTE [DISTWIDTH] IN BLOOD BY AUTOMATED COUNT: 19.8 % (ref 11.5–14.5)
GIANT PLATELETS BLD QL SMEAR: ABNORMAL
GLUCOSE BLD MANUAL STRIP-MCNC: 103 MG/DL (ref 74–99)
GLUCOSE BLD MANUAL STRIP-MCNC: 121 MG/DL (ref 74–99)
GLUCOSE BLD MANUAL STRIP-MCNC: 122 MG/DL (ref 74–99)
GLUCOSE BLD MANUAL STRIP-MCNC: 142 MG/DL (ref 74–99)
GLUCOSE SERPL-MCNC: 139 MG/DL (ref 74–99)
HCT VFR BLD AUTO: 27.9 % (ref 36–46)
HGB BLD-MCNC: 8.3 G/DL (ref 12–16)
IMM GRANULOCYTES # BLD AUTO: 0.56 X10*3/UL (ref 0–0.7)
IMM GRANULOCYTES NFR BLD AUTO: 5.8 % (ref 0–0.9)
LYMPHOCYTES # BLD MANUAL: 0.87 X10*3/UL (ref 1.2–4.8)
LYMPHOCYTES NFR BLD MANUAL: 9 %
MAGNESIUM SERPL-MCNC: 2.02 MG/DL (ref 1.6–2.4)
MCH RBC QN AUTO: 30.4 PG (ref 26–34)
MCHC RBC AUTO-ENTMCNC: 29.7 G/DL (ref 32–36)
MCV RBC AUTO: 102 FL (ref 80–100)
MONOCYTES # BLD MANUAL: 0.58 X10*3/UL (ref 0.1–1)
MONOCYTES NFR BLD MANUAL: 6 %
MYELOCYTES # BLD MANUAL: 0.19 X10*3/UL
MYELOCYTES NFR BLD MANUAL: 2 %
NEUTROPHILS # BLD MANUAL: 8.05 X10*3/UL (ref 1.2–7.7)
NEUTS BAND # BLD MANUAL: 0.19 X10*3/UL (ref 0–0.7)
NEUTS BAND NFR BLD MANUAL: 2 %
NEUTS SEG # BLD MANUAL: 7.86 X10*3/UL (ref 1.2–7)
NEUTS SEG NFR BLD MANUAL: 81 %
NRBC BLD-RTO: 2.5 /100 WBCS (ref 0–0)
PLATELET # BLD AUTO: 36 X10*3/UL (ref 150–450)
POTASSIUM SERPL-SCNC: 4 MMOL/L (ref 3.5–5.3)
PROT SERPL-MCNC: 5 G/DL (ref 6.4–8.2)
Q ONSET: 215 MS
QRS COUNT: 17 BEATS
QRS DURATION: 94 MS
QT INTERVAL: 332 MS
QTC CALCULATION(BAZETT): 434 MS
QTC FREDERICIA: 397 MS
R AXIS: -5 DEGREES
RBC # BLD AUTO: 2.73 X10*6/UL (ref 4–5.2)
RBC MORPH BLD: ABNORMAL
SODIUM SERPL-SCNC: 143 MMOL/L (ref 136–145)
SPHEROCYTES BLD QL SMEAR: ABNORMAL
T AXIS: 232 DEGREES
T OFFSET: 381 MS
TOTAL CELLS COUNTED BLD: 100
VENTRICULAR RATE: 103 BPM
WBC # BLD AUTO: 9.7 X10*3/UL (ref 4.4–11.3)

## 2024-07-04 PROCEDURE — 82947 ASSAY GLUCOSE BLOOD QUANT: CPT

## 2024-07-04 PROCEDURE — 83735 ASSAY OF MAGNESIUM: CPT | Performed by: EMERGENCY MEDICINE

## 2024-07-04 PROCEDURE — 2500000004 HC RX 250 GENERAL PHARMACY W/ HCPCS (ALT 636 FOR OP/ED)

## 2024-07-04 PROCEDURE — 2500000002 HC RX 250 W HCPCS SELF ADMINISTERED DRUGS (ALT 637 FOR MEDICARE OP, ALT 636 FOR OP/ED)

## 2024-07-04 PROCEDURE — 99232 SBSQ HOSP IP/OBS MODERATE 35: CPT

## 2024-07-04 PROCEDURE — 85007 BL SMEAR W/DIFF WBC COUNT: CPT | Performed by: EMERGENCY MEDICINE

## 2024-07-04 PROCEDURE — 2500000004 HC RX 250 GENERAL PHARMACY W/ HCPCS (ALT 636 FOR OP/ED): Performed by: NURSE PRACTITIONER

## 2024-07-04 PROCEDURE — 2060000001 HC INTERMEDIATE ICU ROOM DAILY

## 2024-07-04 PROCEDURE — 36415 COLL VENOUS BLD VENIPUNCTURE: CPT | Performed by: EMERGENCY MEDICINE

## 2024-07-04 PROCEDURE — 85027 COMPLETE CBC AUTOMATED: CPT | Performed by: EMERGENCY MEDICINE

## 2024-07-04 PROCEDURE — 2500000001 HC RX 250 WO HCPCS SELF ADMINISTERED DRUGS (ALT 637 FOR MEDICARE OP)

## 2024-07-04 PROCEDURE — 80053 COMPREHEN METABOLIC PANEL: CPT | Performed by: EMERGENCY MEDICINE

## 2024-07-04 RX ORDER — METOPROLOL TARTRATE 25 MG/1
25 TABLET, FILM COATED ORAL 2 TIMES DAILY
Status: DISCONTINUED | OUTPATIENT
Start: 2024-07-04 | End: 2024-07-09 | Stop reason: HOSPADM

## 2024-07-04 RX ORDER — LEVETIRACETAM 500 MG/1
500 TABLET ORAL EVERY 12 HOURS
Status: DISCONTINUED | OUTPATIENT
Start: 2024-07-04 | End: 2024-07-09 | Stop reason: HOSPADM

## 2024-07-04 RX ORDER — RISPERIDONE 1 MG/1
1 TABLET ORAL 2 TIMES DAILY
Status: DISCONTINUED | OUTPATIENT
Start: 2024-07-04 | End: 2024-07-09 | Stop reason: HOSPADM

## 2024-07-04 RX ORDER — FOLIC ACID 1 MG/1
1 TABLET ORAL DAILY
Status: DISCONTINUED | OUTPATIENT
Start: 2024-07-04 | End: 2024-07-09 | Stop reason: HOSPADM

## 2024-07-04 RX ORDER — ATORVASTATIN CALCIUM 40 MG/1
40 TABLET, FILM COATED ORAL NIGHTLY
Status: DISCONTINUED | OUTPATIENT
Start: 2024-07-04 | End: 2024-07-09 | Stop reason: HOSPADM

## 2024-07-04 RX ORDER — SERTRALINE HYDROCHLORIDE 25 MG/1
25 TABLET, FILM COATED ORAL DAILY
Status: DISCONTINUED | OUTPATIENT
Start: 2024-07-04 | End: 2024-07-09 | Stop reason: HOSPADM

## 2024-07-04 RX ORDER — HYDROCORTISONE 20 MG/1
20 TABLET ORAL EVERY 8 HOURS SCHEDULED
Status: DISCONTINUED | OUTPATIENT
Start: 2024-07-04 | End: 2024-07-05

## 2024-07-04 RX ORDER — METOPROLOL TARTRATE 25 MG/1
12.5 TABLET, FILM COATED ORAL 2 TIMES DAILY
Status: DISCONTINUED | OUTPATIENT
Start: 2024-07-04 | End: 2024-07-04

## 2024-07-04 RX ORDER — ACETAMINOPHEN 500 MG
5 TABLET ORAL NIGHTLY
Status: DISCONTINUED | OUTPATIENT
Start: 2024-07-04 | End: 2024-07-09 | Stop reason: HOSPADM

## 2024-07-04 RX ORDER — LANOLIN ALCOHOL/MO/W.PET/CERES
100 CREAM (GRAM) TOPICAL DAILY
Status: DISCONTINUED | OUTPATIENT
Start: 2024-07-04 | End: 2024-07-09 | Stop reason: HOSPADM

## 2024-07-04 RX ORDER — AMOXICILLIN AND CLAVULANATE POTASSIUM 875; 125 MG/1; MG/1
1 TABLET, FILM COATED ORAL EVERY 12 HOURS SCHEDULED
Status: DISCONTINUED | OUTPATIENT
Start: 2024-07-04 | End: 2024-07-04

## 2024-07-04 RX ORDER — MULTIVIT-MIN/IRON FUM/FOLIC AC 7.5 MG-4
1 TABLET ORAL DAILY
Status: DISCONTINUED | OUTPATIENT
Start: 2024-07-04 | End: 2024-07-09 | Stop reason: HOSPADM

## 2024-07-04 ASSESSMENT — COGNITIVE AND FUNCTIONAL STATUS - GENERAL
PERSONAL GROOMING: TOTAL
MOBILITY SCORE: 6
WALKING IN HOSPITAL ROOM: TOTAL
MOVING FROM LYING ON BACK TO SITTING ON SIDE OF FLAT BED WITH BEDRAILS: TOTAL
MOVING TO AND FROM BED TO CHAIR: TOTAL
WALKING IN HOSPITAL ROOM: TOTAL
MOVING FROM LYING ON BACK TO SITTING ON SIDE OF FLAT BED WITH BEDRAILS: TOTAL
HELP NEEDED FOR BATHING: TOTAL
EATING MEALS: TOTAL
TOILETING: TOTAL
MOVING TO AND FROM BED TO CHAIR: TOTAL
CLIMB 3 TO 5 STEPS WITH RAILING: TOTAL
DAILY ACTIVITIY SCORE: 6
STANDING UP FROM CHAIR USING ARMS: TOTAL
DAILY ACTIVITIY SCORE: 6
TURNING FROM BACK TO SIDE WHILE IN FLAT BAD: TOTAL
TURNING FROM BACK TO SIDE WHILE IN FLAT BAD: TOTAL
CLIMB 3 TO 5 STEPS WITH RAILING: TOTAL
EATING MEALS: TOTAL
MOBILITY SCORE: 6
DRESSING REGULAR LOWER BODY CLOTHING: TOTAL
TOILETING: TOTAL
DRESSING REGULAR UPPER BODY CLOTHING: TOTAL
DRESSING REGULAR LOWER BODY CLOTHING: TOTAL
STANDING UP FROM CHAIR USING ARMS: TOTAL
PERSONAL GROOMING: TOTAL
DRESSING REGULAR UPPER BODY CLOTHING: TOTAL
HELP NEEDED FOR BATHING: TOTAL

## 2024-07-04 ASSESSMENT — PAIN - FUNCTIONAL ASSESSMENT
PAIN_FUNCTIONAL_ASSESSMENT: 0-10

## 2024-07-04 ASSESSMENT — PAIN SCALES - GENERAL
PAINLEVEL_OUTOF10: 0 - NO PAIN

## 2024-07-04 NOTE — CARE PLAN
The patient's goals for the shift include      The clinical goals for the shift include pt will become more oriented      Problem: Nutrition  Goal: Oral intake greater than 50%  Outcome: Progressing  Goal: Oral intake greater 75%  Outcome: Progressing  Goal: Consume prescribed supplement  Outcome: Progressing  Goal: Adequate PO fluid intake  Outcome: Progressing  Goal: Nutrition support goals are met within 48 hrs  Outcome: Progressing  Goal: Nutrition support is meeting 75% of nutrient needs  Outcome: Progressing  Goal: Tube feed tolerance  Outcome: Progressing  Goal: BG  mg/dL  Outcome: Progressing  Goal: Lab values WNL  Outcome: Progressing  Goal: Electrolytes WNL  Outcome: Progressing  Goal: Promote healing  Outcome: Progressing  Goal: Maintain stable weight  Outcome: Progressing  Goal: Reduce weight from edema/fluid  Outcome: Progressing  Goal: Gradual weight gain  Outcome: Progressing  Goal: Improve ostomy output  Outcome: Progressing     Problem: Diabetes  Goal: Achieve decreasing blood glucose levels by end of shift  Outcome: Progressing  Goal: Increase stability of blood glucose readings by end of shift  Outcome: Progressing  Goal: Decrease in ketones present in urine by end of shift  Outcome: Progressing  Goal: Maintain electrolyte levels within acceptable range throughout shift  Outcome: Progressing  Goal: No changes in neurological exam by end of shift  Outcome: Progressing  Goal: Learn about and adhere to nutrition recommendations by end of shift  Outcome: Progressing  Goal: Vital signs within normal range for age by end of shift  Outcome: Progressing  Goal: Receive DSME education by end of shift  Outcome: Progressing     Problem: Skin  Goal: Decreased wound size/increased tissue granulation at next dressing change  Outcome: Progressing  Flowsheets (Taken 7/4/2024 0608 by Nicole Dubois RN)  Decreased wound size/increased tissue granulation at next dressing change: Promote sleep for wound  healing  Goal: Participates in plan/prevention/treatment measures  Outcome: Progressing  Flowsheets (Taken 7/4/2024 0608 by Nicole Dubois RN)  Participates in plan/prevention/treatment measures: Discuss with provider PT/OT consult     Problem: Discharge Planning  Goal: Discharge to home or other facility with appropriate resources  Outcome: Progressing     Problem: Chronic Conditions and Co-morbidities  Goal: Patient's chronic conditions and co-morbidity symptoms are monitored and maintained or improved  Outcome: Progressing

## 2024-07-04 NOTE — PROGRESS NOTES
"Bing Holliday is a 63 y.o. female on day 6 of admission presenting with Pancytopenia (Multi).    Subjective   No acute events overnight.  Patient was evaluated at bedside this morning. She was found in the middle of consuming breakfast. Aox3, but patient seems to still be rapidly fluctuating with hallucinations. Has been maintaining hemodynamically stability. Denies any pain or complaints. Palliative medicine evaluated patient and along with family they are amendable to outpatient palliative services through [facility].        Objective     Physical Exam  Constitutional: No acute distress, appears fatigued  HEENT: anicteric sclera, eomi, dry mucous membranes   Cardiovascular: Irregularly irregular rhythm, no murmurs, 2+ equal pulses of the extremities, normal S1 and S2  Respiratory/Thorax: Patent airways, CTAB, normal breath sounds on room air.  Gastrointestinal: +BS, Nondistended, soft, non-tender, no rebound tenderness or guarding, no masses palpable, no organomegaly  Musculoskeletal: ROM intact, no joint swelling, normal strength  Extremities: normal extremities, no cyanosis, edema, contusions or wounds, no clubbing  Skin: warm and dry. No rashes nor lesions noted  Neurological: intermittently alert and oriented x3, intact senses, motor, response and reflexes, follows commands, no facial droop    Last Recorded Vitals  Blood pressure 126/78, pulse 86, temperature 36.2 °C (97.2 °F), temperature source Temporal, resp. rate 15, height 1.778 m (5' 10\"), weight 96 kg (211 lb 10.3 oz), SpO2 99%.  Intake/Output last 3 Shifts:  I/O last 3 completed shifts:  In: 3285.6 (34.2 mL/kg) [I.V.:2165.6 (22.6 mL/kg); NG/GT:1120]  Out: 600 (6.3 mL/kg) [Urine:600 (0.2 mL/kg/hr)]  Weight: 96 kg     Relevant Results  Scheduled medications  atorvastatin, 40 mg, oral, Nightly  folic acid, 1 mg, oral, Daily  hydrocortisone, 20 mg, oral, q8h GERALD  insulin lispro, 0-5 Units, subcutaneous, Before meals & nightly  levETIRAcetam, 500 mg, oral, " q12h  melatonin, 5 mg, oral, Nightly  metoprolol tartrate, 25 mg, oral, BID  multivitamin with minerals, 1 tablet, oral, Daily  pantoprazole, 40 mg, oral, Daily before breakfast  risperiDONE, 1 mg, oral, BID  sertraline, 25 mg, oral, Daily  thiamine, 100 mg, oral, Daily      Continuous medications     PRN medications  PRN medications: dextrose, dextrose, glucagon, glucagon, magnesium sulfate, magnesium sulfate, OLANZapine, oxygen, potassium chloride  Results from last 7 days   Lab Units 07/04/24 0443 07/03/24  0413 07/02/24  0346   WBC AUTO x10*3/uL 9.7 8.5 6.9   RBC AUTO x10*6/uL 2.73* 2.64* 2.23*   HEMOGLOBIN g/dL 8.3* 7.9* 6.8*     Results from last 7 days   Lab Units 07/04/24 0443 07/03/24  0413 07/02/24  1606 07/02/24  0346   SODIUM mmol/L 143 144 148* 149*   POTASSIUM mmol/L 4.0 4.0 4.2 3.6   CHLORIDE mmol/L 114* 114* 117* 118*   CO2 mmol/L 22 24 25 23   BUN mg/dL 41* 45* 42* 41*   CREATININE mg/dL 1.66* 1.81* 1.88* 2.07*   CALCIUM mg/dL 7.8* 7.8* 8.1* 7.8*   PHOSPHORUS mg/dL  --  2.1* 2.3* 2.6   MAGNESIUM mg/dL 2.02 2.08  --  1.83   BILIRUBIN TOTAL mg/dL 0.5 0.4  --  0.4   ALT U/L 14 15  --  17   AST U/L 14 13  --  14       No results found.     Assessment/Plan   Principal Problem:    Pancytopenia (Multi)    Patient is a 63 y.o. 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 The patient has remained stable since downgrade from the ICU. Goal of ongoing hospitalization period is to safely transition patient from IV to oral home medications, endocrinology consult regarding patient's steroid/insulin regimen     #Septic shock secondary to pneumonia, resolved  #Acute hypoxic respiratory failure secondary to CAP vs. Aspiration PNA  -Off pressors  since 0600 on 6/30  -MRSA negative  -Blood cultures 6/28: No growth  -Urine culture 6/28: No growth  -Vancomycin (6/28-6/29), cefepime 6/28-6/28), Zosyn 6/29-7/1)  -Augmentin (7/2-07/04)  -Now on room air.  Maintain SpO2 greater than 92%  -Continue DuoNebs      #Metabolic Encephalopathy 2/2 adrenal insufficiency versus septic shock, improved  -History: Multiple admissions for AMS found to be in adrenal insufficiency, lupus cerebritis   -Home meds: Zoloft, Keppra 500 mg twice daily, meclizine, paliperidone  -CT head negative   -Continue risperidone, alternative for home paliperidone  -CT head negative   -Continue Keppra 500mg BID  -Zyprexa 5mg q6 prn for agitation      #Elevated troponin with ST depressions likely 2/2 demand ischemia in setting of acute anemia and septic shock  #History of CAD status post CABG 2013  #History of HFmr EF (EF 40 to 45% 5/2024)  #History of atrial fibrillation on Eliquis  -Troponins 14, 18, 75, 64  -Continue atorvastatin 40 mg  -Start home dose metoprolol 25mg as BP has improved after septic shock  -Home Eliquis held, suspect this was in the setting of acute on chronic anemia.  May need to discuss with cardiology regarding resumption of home Eliquis for her atrial fibrillation. May need to consider atrial appendage closure.     #JED on CKD3, likely pre-renal in setting of decreased PO intake +/- ischemia ATN.   #Acute on chronic hypernatremia, resolved  - History: CKD3 (bl Cr 1.5-1.9)  -Cr 1.81 today (down from 2.03 in the ED on admission)  -Daily RFP,Mg,Phos  -Electrolytes: Replete per protocol, goal K>4 Phos >3 Mg >2   -May consider resuming home Lasix prior to discharge now that JED has resolved and patient is at her baseline creatinine level     #Adrenal Insufficiency in setting of infection  #Hypoglycemia  #Uncontrolled type 2 diabetes  -History: recurrent adrenal insufficiency (hydrocortisone), SLE cerebritis and Jaccoud arthropathy on florinef  -Home meds: Lantus 10 units in a.m.,  sliding scale, Cortef 20 mg in a.m./20 mg p.m., flu cortisone 0.1 mg every other day  -sliding scale: SSI #1.  Per endocrinology patient is okay running in the 200s.  Sliding scale instructions adjusted.  Nursing communication order was placed by endocrinology to hold insulin for BGL less than 250.  -Endocrinology recommended hydrocortisone 25mg q8h. Will start hydrocortisone 20 mg 3 times daily which appears to be patient's home dose.  -previous leg wound infection from last hospitalization at [facility]. Consult wound care.      #Acute on chronic anemia of chronic disease, chronic pancytopenia   -History: Pancytopenia   -Home meds: Eliquis 2.5 mg twice daily on hold.   -Worsening thrombocytopenia-chronic versus sepsis versus Zosyn.  Will monitor.  -3 units of packed red blood cells since admission  -Daily CBC, coags     #Chronic conditions  #GERD  #Osteoporosis  -Protonix 40mg  -Continue home medications as tolerated        Global Plan of Care:   Fluids: LR  Electrolytes: replete PRN  Antibiotics: Augmentin  Lines/Access: None  Diet: Potassium restricted, sodium restricted  Consults: Nutrition, PT/OT, SLP, Palliative  DVT PPX: Held  Code status: DNR and No Intubation  Emergency contact: López Lang (son) [phone] (Home Phone)      Dispo: Appears to be clinically improving. Anticipate 1-3 additional midnights, with likely discharge back to SNF.      Case staffed with attending physician, Dr. Yolanda Moreno.       Tsering Dwyer, MS4    I saw this patient with the above medical student and performed my own History and Physical Examination.   My Subjective and Objective findings are reflected in the above documentation.  The Assessment and Plan reflect my input. My Edits are included in the above documentation.    Bing Alarcon, DO  Internal Medicine PGY3

## 2024-07-05 LAB
ALBUMIN SERPL BCP-MCNC: 2.5 G/DL (ref 3.4–5)
ALP SERPL-CCNC: 109 U/L (ref 33–136)
ALT SERPL W P-5'-P-CCNC: 14 U/L (ref 7–45)
ANION GAP SERPL CALC-SCNC: 12 MMOL/L (ref 10–20)
AST SERPL W P-5'-P-CCNC: 13 U/L (ref 9–39)
BASOPHILS # BLD MANUAL: 0 X10*3/UL (ref 0–0.1)
BASOPHILS NFR BLD MANUAL: 0 %
BILIRUB SERPL-MCNC: 0.5 MG/DL (ref 0–1.2)
BUN SERPL-MCNC: 34 MG/DL (ref 6–23)
BURR CELLS BLD QL SMEAR: NORMAL
CALCIUM SERPL-MCNC: 8.1 MG/DL (ref 8.6–10.3)
CHLORIDE SERPL-SCNC: 114 MMOL/L (ref 98–107)
CO2 SERPL-SCNC: 23 MMOL/L (ref 21–32)
CREAT SERPL-MCNC: 1.43 MG/DL (ref 0.5–1.05)
EGFRCR SERPLBLD CKD-EPI 2021: 42 ML/MIN/1.73M*2
EOSINOPHIL # BLD MANUAL: 0 X10*3/UL (ref 0–0.7)
EOSINOPHIL NFR BLD MANUAL: 0 %
ERYTHROCYTE [DISTWIDTH] IN BLOOD BY AUTOMATED COUNT: 19.3 % (ref 11.5–14.5)
GIANT PLATELETS BLD QL SMEAR: NORMAL
GLUCOSE BLD MANUAL STRIP-MCNC: 112 MG/DL (ref 74–99)
GLUCOSE BLD MANUAL STRIP-MCNC: 113 MG/DL (ref 74–99)
GLUCOSE BLD MANUAL STRIP-MCNC: 115 MG/DL (ref 74–99)
GLUCOSE BLD MANUAL STRIP-MCNC: 118 MG/DL (ref 74–99)
GLUCOSE BLD MANUAL STRIP-MCNC: 141 MG/DL (ref 74–99)
GLUCOSE BLD MANUAL STRIP-MCNC: 158 MG/DL (ref 74–99)
GLUCOSE BLD MANUAL STRIP-MCNC: 90 MG/DL (ref 74–99)
GLUCOSE SERPL-MCNC: 114 MG/DL (ref 74–99)
HCT VFR BLD AUTO: 26.7 % (ref 36–46)
HGB BLD-MCNC: 8.1 G/DL (ref 12–16)
IMM GRANULOCYTES # BLD AUTO: 0.43 X10*3/UL (ref 0–0.7)
IMM GRANULOCYTES NFR BLD AUTO: 5.8 % (ref 0–0.9)
LYMPHOCYTES # BLD MANUAL: 1.85 X10*3/UL (ref 1.2–4.8)
LYMPHOCYTES NFR BLD MANUAL: 25 %
MAGNESIUM SERPL-MCNC: 1.74 MG/DL (ref 1.6–2.4)
MCH RBC QN AUTO: 29.9 PG (ref 26–34)
MCHC RBC AUTO-ENTMCNC: 30.3 G/DL (ref 32–36)
MCV RBC AUTO: 99 FL (ref 80–100)
MONOCYTES # BLD MANUAL: 0.15 X10*3/UL (ref 0.1–1)
MONOCYTES NFR BLD MANUAL: 2 %
NEUTROPHILS # BLD MANUAL: 5.4 X10*3/UL (ref 1.2–7.7)
NEUTS BAND # BLD MANUAL: 0.15 X10*3/UL (ref 0–0.7)
NEUTS BAND NFR BLD MANUAL: 2 %
NEUTS SEG # BLD MANUAL: 5.25 X10*3/UL (ref 1.2–7)
NEUTS SEG NFR BLD MANUAL: 71 %
NRBC BLD-RTO: 3.5 /100 WBCS (ref 0–0)
OVALOCYTES BLD QL SMEAR: NORMAL
PLATELET # BLD AUTO: 42 X10*3/UL (ref 150–450)
POLYCHROMASIA BLD QL SMEAR: NORMAL
POTASSIUM SERPL-SCNC: 4 MMOL/L (ref 3.5–5.3)
PROT SERPL-MCNC: 4.9 G/DL (ref 6.4–8.2)
RBC # BLD AUTO: 2.71 X10*6/UL (ref 4–5.2)
RBC MORPH BLD: NORMAL
SODIUM SERPL-SCNC: 145 MMOL/L (ref 136–145)
TOTAL CELLS COUNTED BLD: 100
TOXIC GRANULES BLD QL SMEAR: PRESENT
WBC # BLD AUTO: 7.4 X10*3/UL (ref 4.4–11.3)

## 2024-07-05 PROCEDURE — 83735 ASSAY OF MAGNESIUM: CPT | Performed by: EMERGENCY MEDICINE

## 2024-07-05 PROCEDURE — 82947 ASSAY GLUCOSE BLOOD QUANT: CPT

## 2024-07-05 PROCEDURE — 97530 THERAPEUTIC ACTIVITIES: CPT | Mod: GP,CQ

## 2024-07-05 PROCEDURE — 2500000002 HC RX 250 W HCPCS SELF ADMINISTERED DRUGS (ALT 637 FOR MEDICARE OP, ALT 636 FOR OP/ED): Performed by: INTERNAL MEDICINE

## 2024-07-05 PROCEDURE — 2500000002 HC RX 250 W HCPCS SELF ADMINISTERED DRUGS (ALT 637 FOR MEDICARE OP, ALT 636 FOR OP/ED)

## 2024-07-05 PROCEDURE — 97110 THERAPEUTIC EXERCISES: CPT | Mod: GO

## 2024-07-05 PROCEDURE — 36415 COLL VENOUS BLD VENIPUNCTURE: CPT | Performed by: EMERGENCY MEDICINE

## 2024-07-05 PROCEDURE — 99233 SBSQ HOSP IP/OBS HIGH 50: CPT

## 2024-07-05 PROCEDURE — 85027 COMPLETE CBC AUTOMATED: CPT | Performed by: EMERGENCY MEDICINE

## 2024-07-05 PROCEDURE — 97110 THERAPEUTIC EXERCISES: CPT | Mod: GP,CQ

## 2024-07-05 PROCEDURE — 80053 COMPREHEN METABOLIC PANEL: CPT | Performed by: EMERGENCY MEDICINE

## 2024-07-05 PROCEDURE — 92526 ORAL FUNCTION THERAPY: CPT | Mod: GN

## 2024-07-05 PROCEDURE — 2500000004 HC RX 250 GENERAL PHARMACY W/ HCPCS (ALT 636 FOR OP/ED)

## 2024-07-05 PROCEDURE — 85007 BL SMEAR W/DIFF WBC COUNT: CPT | Performed by: EMERGENCY MEDICINE

## 2024-07-05 PROCEDURE — 2500000001 HC RX 250 WO HCPCS SELF ADMINISTERED DRUGS (ALT 637 FOR MEDICARE OP)

## 2024-07-05 PROCEDURE — 1200000002 HC GENERAL ROOM WITH TELEMETRY DAILY

## 2024-07-05 PROCEDURE — 2500000001 HC RX 250 WO HCPCS SELF ADMINISTERED DRUGS (ALT 637 FOR MEDICARE OP): Performed by: NURSE PRACTITIONER

## 2024-07-05 PROCEDURE — 97535 SELF CARE MNGMENT TRAINING: CPT | Mod: GO

## 2024-07-05 RX ORDER — HYDROCORTISONE 20 MG/1
20 TABLET ORAL DAILY
Status: DISCONTINUED | OUTPATIENT
Start: 2024-07-06 | End: 2024-07-09 | Stop reason: HOSPADM

## 2024-07-05 RX ORDER — MAGNESIUM SULFATE HEPTAHYDRATE 40 MG/ML
2 INJECTION, SOLUTION INTRAVENOUS ONCE
Status: COMPLETED | OUTPATIENT
Start: 2024-07-05 | End: 2024-07-05

## 2024-07-05 RX ORDER — SODIUM CHLORIDE 450 MG/100ML
1000 INJECTION, SOLUTION INTRAVENOUS ONCE
Status: COMPLETED | OUTPATIENT
Start: 2024-07-05 | End: 2024-07-05

## 2024-07-05 RX ORDER — HYDROCORTISONE 5 MG/1
10 TABLET ORAL
Status: DISCONTINUED | OUTPATIENT
Start: 2024-07-05 | End: 2024-07-09 | Stop reason: HOSPADM

## 2024-07-05 ASSESSMENT — PAIN - FUNCTIONAL ASSESSMENT
PAIN_FUNCTIONAL_ASSESSMENT: 0-10

## 2024-07-05 ASSESSMENT — COGNITIVE AND FUNCTIONAL STATUS - GENERAL
MOBILITY SCORE: 7
TURNING FROM BACK TO SIDE WHILE IN FLAT BAD: TOTAL
HELP NEEDED FOR BATHING: A LOT
TOILETING: A LOT
DAILY ACTIVITIY SCORE: 6
CLIMB 3 TO 5 STEPS WITH RAILING: TOTAL
MOBILITY SCORE: 8
TOILETING: TOTAL
DRESSING REGULAR UPPER BODY CLOTHING: TOTAL
MOVING TO AND FROM BED TO CHAIR: TOTAL
DRESSING REGULAR LOWER BODY CLOTHING: TOTAL
WALKING IN HOSPITAL ROOM: TOTAL
PERSONAL GROOMING: TOTAL
EATING MEALS: A LOT
HELP NEEDED FOR BATHING: TOTAL
DRESSING REGULAR LOWER BODY CLOTHING: A LOT
MOVING TO AND FROM BED TO CHAIR: TOTAL
MOVING FROM LYING ON BACK TO SITTING ON SIDE OF FLAT BED WITH BEDRAILS: A LOT
DRESSING REGULAR UPPER BODY CLOTHING: TOTAL
WALKING IN HOSPITAL ROOM: TOTAL
DRESSING REGULAR LOWER BODY CLOTHING: TOTAL
STANDING UP FROM CHAIR USING ARMS: TOTAL
PERSONAL GROOMING: A LOT
STANDING UP FROM CHAIR USING ARMS: TOTAL
STANDING UP FROM CHAIR USING ARMS: TOTAL
DAILY ACTIVITIY SCORE: 8
PERSONAL GROOMING: A LOT
TURNING FROM BACK TO SIDE WHILE IN FLAT BAD: TOTAL
DRESSING REGULAR UPPER BODY CLOTHING: A LOT
MOVING FROM LYING ON BACK TO SITTING ON SIDE OF FLAT BED WITH BEDRAILS: A LOT
MOBILITY SCORE: 7
EATING MEALS: A LOT
EATING MEALS: TOTAL
CLIMB 3 TO 5 STEPS WITH RAILING: TOTAL
WALKING IN HOSPITAL ROOM: TOTAL
MOVING TO AND FROM BED TO CHAIR: TOTAL
TURNING FROM BACK TO SIDE WHILE IN FLAT BAD: A LOT
HELP NEEDED FOR BATHING: TOTAL
MOVING FROM LYING ON BACK TO SITTING ON SIDE OF FLAT BED WITH BEDRAILS: A LOT
CLIMB 3 TO 5 STEPS WITH RAILING: TOTAL
TOILETING: TOTAL
DAILY ACTIVITIY SCORE: 12

## 2024-07-05 ASSESSMENT — PAIN SCALES - GENERAL
PAINLEVEL_OUTOF10: 0 - NO PAIN

## 2024-07-05 ASSESSMENT — ACTIVITIES OF DAILY LIVING (ADL): BATHING_LEVEL_OF_ASSISTANCE: MAXIMUM ASSISTANCE

## 2024-07-05 NOTE — PROGRESS NOTES
Physical Therapy    Physical Therapy Treatment    Patient Name: Bing Holliday  MRN: [id number]  Today's Date: 7/5/2024  Time Calculation  Start Time: 0955  Stop Time: 1035  Time Calculation (min): 40 min     2102/2102-A       07/05/24 0955   PT  Visit   PT Received On 07/05/24   Response to Previous Treatment Patient with no complaints from previous session.   General   Referred By Dr. Marin   Prior to Session Communication Bedside nurse   Patient Position Received Bed, 4 rail up;Alarm on   Preferred Learning Style verbal   General Comment Pt  in bed when I arrived. Pt.was agreeable to PT. Pt was hallucinating,  C/o being weak.   Precautions   Medical Precautions Fall precautions;   Precautions Comment Bed/ Chair Alarms   Cognition   Orientation Level Disoriented to time   Therapeutic Exercise   Therapeutic Exercise Performed Yes   Therapeutic Exercise Activity 1 Supine ex's AP, QS, GS. Heel Slide, Hip abd/add  2x5 reps   Bed Mobility   Bed Mobility Yes   Bed Mobility 1   Bed Mobility 1 Supine to sitting;Sitting to supine   Level of Assistance 1 Moderate assistance;Maximum assistance   Bed Mobility Comments 1 Pt. transfer supine to sit with mod Ax2, Sit to supine Max/Dep x2   Activity Tolerance   Endurance Tolerates less than 15 min exercise, no significant change in vital signs   PT Assessment   PT Assessment Results Decreased strength;Decreased range of motion;Decreased endurance;Impaired balance;Decreased mobility;Decreased cognition;Impaired judgement;Decreased safety awareness;Impaired vision   End of Session Communication Bedside nurse   End of Session Patient Position Bed, 3 rail up;Alarm on  (call light within reach)   PT Plan   PT Plan Ongoing PT   PT Frequency 3 times per week   PT Discharge Recommendations Moderate intensity level of continued care       Assessment/Plan   PT Assessment  PT Assessment Results: Decreased strength, Decreased range of motion, Decreased endurance, Impaired balance,  Decreased mobility, Decreased cognition, Impaired judgement, Decreased safety awareness, Impaired vision  End of Session Communication: Bedside nurse  End of Session Patient Position: Bed, 3 rail up, Alarm on (call light within reach)     PT Plan  Treatment/Interventions: Bed mobility, Transfer training, Gait training, Balance training, Neuromuscular re-education, Strengthening, Endurance training, Therapeutic exercise, Range of motion, Therapeutic activity, Home exercise program, Positioning, Postural re-education  PT Plan: Ongoing PT  PT Frequency: 3 times per week  PT Discharge Recommendations: Moderate intensity level of continued care  PT Recommended Transfer Status: Total assist  PT - OK to Discharge: Yes (Once medically cleared to next level of care.)    Outcome Measures:  [facility] Basic Mobility  Turning from your back to your side while in a flat bed without using bedrails: A lot  Moving from lying on your back to sitting on the side of a flat bed without using bedrails: A lot  Moving to and from bed to chair (including a wheelchair): Total  Standing up from a chair using your arms (e.g. wheelchair or bedside chair): Total  To walk in hospital room: Total  Climbing 3-5 steps with railing: Total  Basic Mobility - Total Score: 8                             EDUCATION:     Education Documentation  Home Exercise Program, taught by Garret Dunbar PTA at 7/5/2024  1:21 PM.  Learner: Patient  Readiness: Acceptance  Method: Demonstration, Explanation  Response: Verbalizes Understanding    Precautions, taught by Garret Dunbar PTA at 7/5/2024  1:21 PM.  Learner: Patient  Readiness: Acceptance  Method: Demonstration, Explanation  Response: Verbalizes Understanding    Body Mechanics, taught by Garret Dunbar PTA at 7/5/2024  1:21 PM.  Learner: Patient  Readiness: Acceptance  Method: Demonstration, Explanation  Response: Verbalizes Understanding    Mobility Training, taught by Garret Dunbar PTA at 7/5/2024  1:21  PM.  Learner: Patient  Readiness: Acceptance  Method: Demonstration, Explanation  Response: Verbalizes Understanding    Education Comments  No comments found.        GOALS:  Encounter Problems       Encounter Problems (Active)       Balance       Pt will demonstrate static/dynamic sitting balance for >/= 5 min CGA with UE/LE support without evidence of instability or LOB.        Start:  07/02/24    Expected End:  07/16/24            Pt will demonstrate static/dynamic standing balance for >/= 3 min mod A x2 without evidence of instability or LOB with functional mobility tasks.         Start:  07/02/24    Expected End:  07/16/24                   Mobility       Pt will complete supine, seated and standing exercises to maintain/improve overall strength with minimal verbal cues.         Start:  07/02/24    Expected End:  07/16/24               PT Transfers       Pt will be able to complete all bed mobility tasks with use of bed HR mod A.       Start:  07/02/24    Expected End:  07/16/24            Pt will be able to complete all transfers with least restrictive device mod A x2 demonstrating good safety awareness and proper body mechanics.        Start:  07/02/24    Expected End:  07/16/24               Pain - Adult

## 2024-07-05 NOTE — CARE PLAN
Problem: Nutrition  Goal: Oral intake greater than 50%  Outcome: Progressing  Goal: Oral intake greater 75%  Outcome: Progressing  Goal: Consume prescribed supplement  Outcome: Progressing  Goal: Adequate PO fluid intake  Outcome: Progressing  Goal: Nutrition support goals are met within 48 hrs  Outcome: Progressing  Goal: Nutrition support is meeting 75% of nutrient needs  Outcome: Progressing  Goal: Tube feed tolerance  Outcome: Progressing  Goal: BG  mg/dL  Outcome: Progressing  Goal: Lab values WNL  Outcome: Progressing  Goal: Electrolytes WNL  Outcome: Progressing  Goal: Promote healing  Outcome: Progressing  Goal: Maintain stable weight  Outcome: Progressing  Goal: Reduce weight from edema/fluid  Outcome: Progressing  Goal: Gradual weight gain  Outcome: Progressing  Goal: Improve ostomy output  Outcome: Progressing     Problem: Diabetes  Goal: Increase stability of blood glucose readings by end of shift  Outcome: Progressing     Problem: Skin  Goal: Decreased wound size/increased tissue granulation at next dressing change  Outcome: Progressing  Goal: Participates in plan/prevention/treatment measures  Outcome: Progressing  Goal: Prevent/manage excess moisture  Outcome: Progressing  Goal: Prevent/minimize sheer/friction injuries  Outcome: Progressing  Goal: Promote/optimize nutrition  Outcome: Progressing  Goal: Promote skin healing  Outcome: Progressing     Problem: Pain - Adult  Goal: Verbalizes/displays adequate comfort level or baseline comfort level  Outcome: Progressing     Problem: Discharge Planning  Goal: Discharge to home or other facility with appropriate resources  Outcome: Progressing     Problem: Chronic Conditions and Co-morbidities  Goal: Patient's chronic conditions and co-morbidity symptoms are monitored and maintained or improved  Outcome: Progressing     Problem: Safety - Adult  Goal: Free from fall injury  Outcome: Progressing

## 2024-07-05 NOTE — PROGRESS NOTES
Speech-Language Pathology    SLP Adult Inpatient  Speech-Language Pathology Treatment     Patient Name: Bing Holliday  MRN: [id number]  Today's Date: 7/5/2024  Time Calculation  Start Time: 0927  Stop Time: 0948  Time Calculation (min): 21 min        Recommendations:  Diet: Regular solids solids (level 7)  Liquids: Thin (level 0)  Medications: Per RN discretion  Compensatory Strategies/Aspiration Precautions: 1:1 assist/supervision, alert, upright at 90 degrees during PO intake + 20-30 minutes following meals, small/single sips + bites, slow rate, alternate consistencies, allow extra time to chew + swallow  Please notify the SLP team with any concerns/issues/changes regarding patient's PO intake/aspiration      Plan:  Inpatient/Swing Bed or Outpatient: Inpatient  Treatment/Interventions: Patient/family education, Dysphagia management  SLP TX Plan: Continue Plan of Care  SLP Plan: Skilled SLP  SLP Frequency: Follow-up visit only  Duration: 1 week  SLP Discharge Recommendations: D/C as patient is functional for this level of care  Next Treatment Priority: Ongoing assessment  Discussed POC: Patient, Nursing  Discussed Risks/Benefits: Yes, Patient, Nursing  Patient/Caregiver Agreeable: Yes  GOALS (established following the bedside swallow evaluation on 7/2):  1. Patient will tolerate baseline diet with no overt s/s of aspiration in 90% of observed therapeutic trials.  -Ongoing; recommend an advancement to regular solids and thin liquids at this time  2. Patient will implement safe swallowing strategies to reduce risk of aspiration in 90% of trials given caregiver assistance/cueing as needed.  -Ongoing; required intermittent cues/assist  3. Pending appropriateness for same, patient will complete a modified barium swallow study (MBSS) for objective assessment of swallowing function, to assess for aspiration, and to guide further recommendations and treatment plan.  -Not addressed this date      Current Problem per MD on  "7/2:  -Metabolic Encephalopathy 2/2 adrenal insufficiency vs sepsis-mental status improving dramatically over past 24h   -Septic Shock refractory to IVF 2/2 PNA vs UTI, elevated troponin with ST depressions likely 2/2 demand ischemia in setting of acute anemia   -AHRF 2/2 CAP, resolved   -JED on CKD3, likely pre-renal in setting of decreased PO intake +/- ischemia ATN. Acute on chronic hypernatremia   -Adrenal Insufficiency in setting of infection   -Acute on chronic anemia of chronic disease, chronic pancytopenia   -Sepsis 2/2 PNA vs UTI   Please see MD reports for detailed/additional information.      Imaging:  CXR (6/28)- \"1. Stable cardiomegaly and/or pericardial effusion.  2. Life-support devices positions as above.\"  There is no updated imaging to report at this time.       Subjective   Patient with improved mentation and participation this date. She is requesting a diet advancement.        Most Recent Visit:  7/3/24      General Visit Information:  Patient Seen During This Visit: Yes  Caregiver Feedback: Patient with improved mentation and is requesting a diet advancement  Prior to Session Communication: Bedside nurse      Pain Assessment:  Pain Assessment: 0-10  0-10 (Numeric) Pain Score: 0 - No pain      Therapeutic Swallow/Objective/Clinical Observations:  Therapeutic Swallow Intervention : Compensatory Strategies, PO Trials  Solid Diet Recommendations: Regular (IDDSI Level 7)  Liquid Diet Recommendations: Thin (IDDSI Level 0)  Oral: Adequate prehension without labial escape. Oral transit, mastication, and bolus formation/manipulation was prolonged at times; however, appeared functional/adequate without obvious oral residual.   Pharyngeal: No overt clinical s/s of aspiration noted. Swallow onset appeared timely/functional. Laryngeal elevation was visually appreciated.    Patient's vocals and respirations remained dry/clear during this assessment.   Her RR/WOB did not appear to increase with PO intake. "   Patient denied odynphagia, dysphagia, a globus sensation, and GERD at this time.       Inpatient Education:  Patient and RN were educated on the above. Patient will require ongoing education. Note, family was not present.         Thank you!

## 2024-07-05 NOTE — PROGRESS NOTES
Nutrition Follow Up:          Patient is a 63 y.o. female presenting 6/28 with AMS and abnormal labs admitted to ICU for metabolic encephalopathy secondary to multifactorial sepsis with adrenal insufficiency. Code status changed yesterday from full code to DNR/DNI. Family at bedside this afternoon agreeing that enteral feeding is the best option at this time for nutrition.    Follow up 7/01: Pt weaned off pressors and required Precedex yesterday, but now this has been discontinued as well. She is more awake and yelling out this afternoon. Corpak has been placed and bridled with enteral feeding running: Osmolite 1.5 started.     7/5/2024 Follow up: Chart reviewed and events noted. Pt started on EN however pt pulled out dobbhoff 7/3; failed ST 7/2 however passed 7/3 for puree level 4/thin liquids. Pt now more awake 7/5 and asking for regular foods--pt passed ST 7/5 AM for regular diet.    Past Medical History:   Diagnosis Date    Abnormal kidney function 04/04/2023    Acute headache 03/10/2024    Acute iritis of right eye 07/24/2015    Acute low back pain 10/30/2023    Acute upper respiratory infection, unspecified 03/04/2020    Acute URI    Acute upper respiratory infection, unspecified 09/30/2015    URTI (acute upper respiratory infection)    Arthralgia of right knee 02/14/2024    Arthritis     Atypical facial pain 03/10/2024    Body mass index (BMI) 23.0-23.9, adult 10/15/2021    BMI 23.0-23.9, adult    Body mass index (BMI) 33.0-33.9, adult 03/04/2020    BMI 33.0-33.9,adult    Cardiomegaly 08/27/2013    Left ventricular hypertrophy    Chest pain 06/10/2022    Comment on above: Added by Problem List Migration; 2013-3-12; Moved to Suppressed Nov 25 2013 9:16PM; Comment on above: Added by Problem List Migration; 2013-3-12; Moved to Suppressed Nov 25 2013 9:16PM;    Chronic kidney disease, stage 3 unspecified (Multi) 07/02/2013    Chronic kidney disease, stage III (moderate)    Confusional state 03/10/2024    Contact  with and (suspected) exposure to covid-19 04/04/2023    Delirium 03/10/2024    Disease of pericardium, unspecified ([facility]) 07/02/2013    Pericardial disease    Encounter for follow-up examination after completed treatment for conditions other than malignant neoplasm 10/06/2022    Hospital discharge follow-up    Generalized contraction of visual field, right eye 01/29/2015    Generalized contraction of visual field of right eye    Hearing loss 03/10/2024    History of cataract 03/10/2024    History of thrombocytopenia 03/10/2024    Comment on above: Added by Problem List Migration; 2013-7-2;    Homonymous bilateral field defects, right side 04/29/2016    Homonymous bilateral field defects of right side    Hypertensive chronic kidney disease with stage 1 through stage 4 chronic kidney disease, or unspecified chronic kidney disease 07/02/2013    Nephrosclerosis    Laceration without foreign body, left foot, initial encounter 07/03/2018    Foot laceration, left, initial encounter    Localized edema 03/10/2024    Localized, primary osteoarthritis 02/14/2024    Mass of skin 03/10/2024    Migraine with aura, not intractable, without status migrainosus 10/24/2022    Ocular migraine    Open wound 03/10/2024    Open wound of left foot 03/10/2024    Other conditions influencing health status 07/02/2013    Chronic Glomerulonephritis In Diseases Classified Elsewhere    Other conditions influencing health status 07/02/2013    Progressive Familial Myoclonic Epilepsy    Other conditions influencing health status 07/02/2013    Protein S Deficiency    Other conditions influencing health status 05/22/2015    Familial Combined Hyperlipidemia    Other conditions influencing health status 10/24/2022    IDDM (insulin dependent diabetes mellitus)    Other conditions influencing health status 03/14/2022    Diabetes mellitus, insulin dependent (IDDM), uncontrolled    Other long term (current) drug therapy 10/24/2022    Long-term use of  Plaquenil    Overweight with body mass index (BMI) 25.0-29.9 03/10/2024    Pain of toe 03/10/2024    Personal history of COVID-19 04/04/2023    Personal history of diseases of the blood and blood-forming organs and certain disorders involving the immune mechanism 07/02/2013    History of thrombocytopenia    Personal history of diseases of the skin and subcutaneous tissue 08/11/2015    History of foot ulcer    Personal history of nephrotic syndrome 07/02/2013    History of nephrotic syndrome    Personal history of other diseases of the circulatory system 08/27/2013    History of sinus tachycardia    Personal history of other diseases of the nervous system and sense organs     History of cataract    Personal history of other diseases of the respiratory system     History of bronchitis    Personal history of other infectious and parasitic diseases 07/02/2013    History of hepatitis    Personal history of other specified conditions     History of shortness of breath    Personal history of other specified conditions 08/27/2013    History of edema    Posterior epistaxis 03/10/2024    Puckering of macula, right eye 10/24/2022    ERM OD (epiretinal membrane, right eye)    Raynaud's syndrome without gangrene 07/02/2013    Raynaud's disease    Sinusitis 03/10/2024    Systemic lupus erythematosus, unspecified (Multi) 07/24/2015    SLE (systemic lupus erythematosus)    Systemic lupus erythematosus, unspecified (Multi) 07/24/2015    SLE (systemic lupus erythematosus)    Systemic lupus erythematosus, unspecified (Multi) 07/24/2015    Systemic lupus    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) 07/02/2013    Type 2 diabetes with nephropathy    Type 2 diabetes mellitus with mild nonproliferative diabetic retinopathy without macular edema, left eye (Multi) 07/27/2015    Non-proliferative diabetic retinopathy, left eye    Type 2 diabetes mellitus with mild nonproliferative diabetic retinopathy without macular edema,  "unspecified eye (Multi) 07/24/2015    Mild non proliferative diabetic retinopathy    Type 2 diabetes mellitus with proliferative diabetic retinopathy without macular edema, right eye (Multi) 07/27/2015    Proliferative diabetic retinopathy of right eye    Type 2 diabetes mellitus with proliferative diabetic retinopathy without macular edema, unspecified eye (Multi) 07/24/2015    Diabetic proliferative retinopathy    Unspecified acute and subacute iridocyclitis 07/24/2015    Acute iritis, right eye    Unspecified open wound, left foot, sequela 07/03/2018    Wound, open, foot, left, sequela         Nutrition History:  Food and Nutrient History: 6/29: Pt has been hospitalized every month (5 times) since March 2024. She has been in a nursing home since April and recently moved to a new nursing home 2 weeks ago after her most recent hospitalization in [place]. Call placed to [facility] and nurse explained that pt was a 1-person assist when she admitted 6/17, but last Monday (6/24) she noted that pt was suddenly a 2-person assist and seemed to be rapidly declining in her mental status as well. Labs were drawn and found to be abnormal so she was sent to the hospital (again). Nurse reports she never ate very much - eating was not a priority for her.  Vitamin/Herbal Supplement Use: Folic Acid, MVI, Thiamine,  Food Allergies/Intolerances:  None  GI Symptoms: None  Oral Problems: None--passed ST 7/5 for regular foods/added moisture thin liquids and 1:1 feed.    0-10 (Numeric) Pain Score: 0 - No pain      Anthropometrics:  Height: 177.8 cm (5' 10\")   Weight: 96 kg (211 lb 10.3 oz)   BMI (Calculated): 30.37  IBW/kg (Dietitian Calculated): 68.04 kg  Percent of IBW: 140.67 %       Weight History:   Wt Readings from Last 15 Encounters:   07/05/24 96 kg (211 lb 10.3 oz)   06/26/24 95.7 kg (211 lb)   06/21/24 95.7 kg (211 lb)   06/04/24 95.3 kg (210 lb)   05/13/24 102 kg (224 lb 13.9 oz)   04/24/24 90.9 kg (200 lb 6.4 " oz)   03/19/24 96.5 kg (212 lb 11.9 oz)   03/11/24 96.5 kg (212 lb 11.2 oz)   02/14/24 86.2 kg (190 lb)   02/08/24 87.5 kg (193 lb)   01/20/24 99.3 kg (218 lb 14.7 oz)   01/17/24 96 kg (211 lb 10.3 oz)   12/26/23 93 kg (205 lb)   12/18/23 93.4 kg (205 lb 14.6 oz)   11/30/23 105 kg (231 lb)       Weight Change %:  Weight History / % Weight Change: Weight history shows fluctuation probably in relation to edema and variable PO intakes. Last 6 months wt range has been 190-224 lbs per chart.  Significant Weight Loss: Yes  Interpretation of Weight Loss: >5% in 1 month    Nutrition Focused Physical Exam Findings:  defer: family at bedside - pt not responding  Subcutaneous Fat Loss:      Muscle Wasting:     Edema:  Edema: +1 trace  Edema Location: nonpitting BLE  Physical Findings:  Skin: Positive (right buttock wound, scattered skin tears; see nursing notes for details.)    Nutrition Significant Labs:  CBC Trend:   Results from last 7 days   Lab Units 07/05/24  0511 07/04/24  0443 07/03/24  0413 07/02/24  0346   WBC AUTO x10*3/uL 7.4 9.7 8.5 6.9   RBC AUTO x10*6/uL 2.71* 2.73* 2.64* 2.23*   HEMOGLOBIN g/dL 8.1* 8.3* 7.9* 6.8*   HEMATOCRIT % 26.7* 27.9* 25.6* 21.8*   MCV fL 99 102* 97 98   PLATELETS AUTO x10*3/uL 42* 36* 45* 43*    , BMP Trend:   Results from last 7 days   Lab Units 07/05/24  0511 07/04/24  0443 07/03/24  0413 07/02/24  1606   GLUCOSE mg/dL 114* 139* 258* 255*   CALCIUM mg/dL 8.1* 7.8* 7.8* 8.1*   SODIUM mmol/L 145 143 144 148*   POTASSIUM mmol/L 4.0 4.0 4.0 4.2   CO2 mmol/L 23 22 24 25   CHLORIDE mmol/L 114* 114* 114* 117*   BUN mg/dL 34* 41* 45* 42*   CREATININE mg/dL 1.43* 1.66* 1.81* 1.88*    , BG POCT trend:   Results from last 7 days   Lab Units 07/05/24  1144 07/05/24  0644 07/05/24  0401 07/04/24  2359 07/04/24  2049   POCT GLUCOSE mg/dL 118* 90 112* 113* 142*    , Liver Function Trend:   Results from last 7 days   Lab Units 07/05/24  0511 07/04/24  0443 07/03/24  0413 07/02/24  0346   ALK PHOS U/L  109 105 125 114   AST U/L 13 14 13 14   ALT U/L 14 14 15 17   BILIRUBIN TOTAL mg/dL 0.5 0.5 0.4 0.4    , Renal Lab Trend:   Results from last 7 days   Lab Units 07/05/24 0511 07/04/24 0443 07/03/24 0413 07/02/24  1606   POTASSIUM mmol/L 4.0 4.0 4.0 4.2   PHOSPHORUS mg/dL  --   --  2.1* 2.3*   SODIUM mmol/L 145 143 144 148*   MAGNESIUM mg/dL 1.74 2.02 2.08  --    EGFR mL/min/1.73m*2 42* 35* 31* 30*   BUN mg/dL 34* 41* 45* 42*   CREATININE mg/dL 1.43* 1.66* 1.81* 1.88*    , TPN/PPN Labs:   Results from last 7 days   Lab Units 07/05/24 0511 07/04/24 0443 07/03/24 0413 07/02/24  1606   GLUCOSE mg/dL 114* 139* 258* 255*   POTASSIUM mmol/L 4.0 4.0 4.0 4.2   PHOSPHORUS mg/dL  --   --  2.1* 2.3*   MAGNESIUM mg/dL 1.74 2.02 2.08  --    SODIUM mmol/L 145 143 144 148*   CHLORIDE mmol/L 114* 114* 114* 117*   ALT U/L 14 14 15  --    AST U/L 13 14 13  --    ALK PHOS U/L 109 105 125  --    BILIRUBIN TOTAL mg/dL 0.5 0.5 0.4  --     , Vit D:   Lab Results   Component Value Date    VITD25 34 12/10/2022    , Vit B12:   Lab Results   Component Value Date    EEWVEQLD91 1,499 (H) 03/20/2024    , Iron Panel:   Lab Results   Component Value Date    IRON 157 (H) 06/14/2024    TIBC 220 (L) 06/14/2024    FERRITIN 1,060 (H) 04/25/2024    , Folate:   Lab Results   Component Value Date    FOLATE >24.0 04/25/2024        Nutrition Specific Medications:  Scheduled medications  atorvastatin, 40 mg, oral, Nightly  folic acid, 1 mg, oral, Daily  hydrocortisone, 10 mg, oral, Daily  [START ON 7/6/2024] hydrocortisone, 20 mg, oral, Daily  insulin lispro, 0-5 Units, subcutaneous, Before meals & nightly  levETIRAcetam, 500 mg, oral, q12h  melatonin, 5 mg, oral, Nightly  metoprolol tartrate, 25 mg, oral, BID  multivitamin with minerals, 1 tablet, oral, Daily  pantoprazole, 40 mg, oral, Daily before breakfast  risperiDONE, 1 mg, oral, BID  sertraline, 25 mg, oral, Daily  thiamine, 100 mg, oral, Daily      Continuous medications       PRN  medications  PRN medications: dextrose, dextrose, glucagon, glucagon, magnesium sulfate, magnesium sulfate, OLANZapine, oxygen, potassium chloride      I/O:   Last BM Date: 07/04/24; Stool Appearance: Loose (07/03/24 2134)    Dietary Orders (From admission, onward)       Start     Ordered    07/05/24 1112  Adult diet Regular; Thin 0; 1:1 Feeding  Diet effective now        Comments: Moistening agents on each tray please   Question Answer Comment   Diet type Regular    Fluid consistency Thin 0    Select tray type: 1:1 Feeding        07/05/24 1111                     Estimated Needs:   Total Energy Estimated Needs (kCal):  (2002 kcal (MSJ x 1.1 x 1.2) or 22.8 kcal/kg)     Total Protein Estimated Needs (g):  (88-105g protein (1.0-1.2 g/kg))     Total Fluid Estimated Needs (mL):  (1mL/kcal/d or as per physician)           Nutrition Diagnosis   Malnutrition Diagnosis  Patient has Malnutrition Diagnosis: Yes  Diagnosis Status: Ongoing  Malnutrition Diagnosis: Moderate malnutrition related to chronic disease or condition  As Evidenced by: >5% wt loss x 1 month with reported intakes <75% of estimated nutritional needs.    Nutrition Diagnosis  Patient has Nutrition Diagnosis: Yes  Diagnosis Status (1): Resolved  Nutrition Diagnosis 1: Inadequate oral intake  Related to (1): altered mental status  As Evidenced by (1): NPO status  Additional Assessment Information (1): 7/5: resolved as pt now awake/alert and asking for meal. Case discussed with physician--agreeable to regular/liberalized diet. No CHO restriction as pt tends to become hypoglycemic; Endocrinology involved in pt care.       Nutrition Interventions/Recommendations         Nutrition Prescription:  Individualized Nutrition Prescription Provided for : heatlhful diet        Nutrition Interventions:   Interventions: Meals and snacks, Medical food supplement  Meals and Snacks: General healthful diet  Goal: Regular/liberalized diet; added gravies/sauces with 1:1 feed as  per ST 7/5.  Medical Food Supplement: Commercial beverage  Goal: Glucerna 3x/day with meals for additional 660kcal and 30g pro/day    Collaboration and Referral of Nutrition Care: Collaboration by nutrition professional with other providers  Goal: AMINAH Mistry and Dr. Mcduffie    Nutrition Education:   7/5: Met with pt at bedside--agreeable to order lunch since only ate 25% of puree breakfast. Ordered everything omelet, fruit, juice, muffin, lemonade. Pt denies further needs at present time.        Nutrition Monitoring and Evaluation   Food/Nutrient Related History Monitoring  Monitoring and Evaluation Plan: Energy intake  Energy Intake: Estimated energy intake  Criteria: >75% of estimated nutritional needs via meals/snacks/ONS    Body Composition/Growth/Weight History  Monitoring and Evaluation Plan: Weight  Weight: Measured weight  Criteria: maintain stable weight    Biochemical Data, Medical Tests and Procedures  Monitoring and Evaluation Plan: Electrolyte/renal panel, Glucose/endocrine profile  Electrolyte and Renal Panel: BUN, Creatinine, Magnesium, Phosphorus, Potassium, Sodium  Criteria: labs WNR  Glucose/Endocrine Profile: Glucose, casual, Glucose, fasting  Criteria: BG 70-180mg/dL; prevent hypoglycemia    Nutrition Focused Physical Findings  Monitoring and Evaluation Plan: Skin  Skin: Other (Comment)  Criteria: promote skin integrity         Time Spent (min): 45 minutes

## 2024-07-05 NOTE — PROGRESS NOTES
Occupational Therapy                 Therapy Communication Note    Patient Name: Bing Holliday  MRN: [id number]  Today's Date: 7/5/2024     Discipline: Occupational Therapy    Missed Visit Reason: Missed Visit Reason: Other (Comment) (Patient eating lunch)    Missed Time: Attempt    Comment:  Patient eating lunch. Will reattempt OT eval as appropriate.

## 2024-07-05 NOTE — PROGRESS NOTES
"    Endocrinology Inpatient Consult Progress Note     PATIENT NAME: Bing Holliday  MRN: [id number]  DATE: 7/5/2024    CONSULTING PHYSICIAN: Dr. Zavaleta  REASON FOR CONSULT: AI. Abnormal TFTs.      Interval Events     Transfer to [facility].  Mental status is much better.  The patient is able to carry on a conversation.  She states she is hungry.  \"I do not want to eat this baby food\".  \"Please give me regular food\".  She denies any nausea or vomiting.      Physical Examination     /82 (BP Location: Left arm, Patient Position: Lying)   Pulse 72   Temp 35.6 °C (96.1 °F) (Temporal)   Resp 19   Ht 1.778 m (5' 10\")   Wt 96 kg (211 lb 10.3 oz)   LMP  (LMP Unknown)   SpO2 100%   BMI 30.37 kg/m²   No acute distress.  Appropriate affect.  Temporal wasting.  Frontal alopecia.  Regular rate and rhythm.  Nonlabored respiration.  Soft nontender nondistended abdomen.  No pedal edema bilaterally.      Medications     Reviewed MAR       Data     Recent Labs and Imaging Reviewed      Assessment / Plan        # Adrenal Insufficiency  Clinically improved.  Her IV hydrocortisone has been transition to oral hydrocortisone 20 mg every 8 hours.  In the past, I have had this patient on prednisone due to questionable compliance with a twice daily regimen of hydrocortisone.  I would assume that she is going to ECF rather than home.  We can continue her on hydrocortisone but will change this to 20 mg in the morning and 10 mg in the afternoon.     # Hypoglycemia  # Uncontrolled Type 2 Diabetes Mellitus  This patient has a history of becoming hypoglycemic.  She has many comorbidities.  She is status post enteral nutrition in the ICU.  I reviewed her sugars and they look quite reasonable.  I am okay with some hyperglycemia in her case given her multiple comorbidities.  Agree with sliding scale #1.      # Abnormal TFTs  As per my initial consult note.  No intervention necessary at this time.     # Osteoporosis  In the setting of chronic " glucocorticoid use. This will be further managed as an outpatient. She probably would benefit from a bisphosphonate, these agents are best studied with steroid-induced adynamic bone disease.         Avi Sloan, DO  Endocrinology, Diabetes, and Metabolism    Available via [employer] Messenger    Please excuse any typographical or unwanted errors within this documentation as voice recognition software was used to dictate this note.

## 2024-07-05 NOTE — PROGRESS NOTES
Occupational Therapy    Occupational Therapy Treatment    Name: Bing Holliday  MRN: [id number]  : 1960  Date: 24       Assessment:  OT Assessment: Patient would benefit from additional skilled rehab at a moderate intensity to maximize independence in adls, activity tolerance for adls, and functional mobility tasks for adls.  Evaluation/Treatment Tolerance: Patient limited by fatigue  End of Session Patient Position: Bed, 3 rail up, Alarm on  Plan:  Treatment Interventions: ADL retraining, Functional transfer training, UE strengthening/ROM, Endurance training, Cognitive reorientation, Patient/family training  OT Frequency: 3 times per week  OT Discharge Recommendations: Moderate intensity level of continued care  Equipment Recommended upon Discharge: Wheelchair  OT Recommended Transfer Status: Assist of 2  OT - OK to Discharge: Yes (to next level of care when cleared by medical team.)    Subjective   Previous Visit Info:  OT Last Visit  OT Received On: 24  General:  General  Missed Visit: Yes  Missed Visit Reason: Other (Comment) (Patient eating lunch)  Patient Position Received: Bed, 3 rail up, Alarm on  Preferred Learning Style: verbal  Precautions:  Medical Precautions: Fall precautions  Braces Applied:  (B PRAFOs intact throughout)  Vitals:     Pain Assessment:  Pain Assessment  Pain Assessment: 0-10  0-10 (Numeric) Pain Score: 0 - No pain     Objective   Cognition:  Orientation Level: Disoriented to place, Disoriented to time  Activities of Daily Living: Feeding  Feeding Level of Assistance: Maximum assistance    Grooming  Grooming Level of Assistance: Maximum assistance    UE Bathing  UE Bathing Level of Assistance: Maximum assistance    LE Bathing  LE Bathing Level of Assistance: Maximum assistance    UE Dressing  UE Dressing Level of Assistance: Maximum assistance    LE Dressing  LE Dressing:  (MAX X 1 assist)         Functional Standing Tolerance:     Bed Mobility/Transfers: Bed  Mobility  Bed Mobility: Yes (MAX X 1 supine to sit eob. Maintain sitting balance 6 minutes with MIN/MOD X1 during dynamic reaching tasks. Rest periods given d/t fatigue. MAX X 1 sit to supine in bed and reposition for increased comfort. Bed check on.)    Functional Mobility:     Sitting Balance:  Static Sitting Balance  Static Sitting-Level of Assistance: Minimum assistance  Dynamic Sitting Balance  Dynamic Sitting-Balance Support: No upper extremity supported  Dynamic Sitting-Balance: Reaching for objects  Standing Balance:          RUE   RUE : Exceptions to WFL (R UE ther ex against gravity, 10x each group, contractures noted right hand all digits.), LUE   LUE: Exceptions to WFL (L UE ther ex against gravity, 10x each group, contractures noted right hand all digits.),  , and      Outcome Measures:  [facility] Daily Activity  Putting on and taking off regular lower body clothing: A lot  Bathing (including washing, rinsing, drying): A lot  Putting on and taking off regular upper body clothing: A lot  Toileting, which includes using toilet, bedpan or urinal: A lot  Taking care of personal grooming such as brushing teeth: A lot  Eating Meals: A lot  Daily Activity - Total Score: 12         and      Education Documentation  No documentation found.  Education Comments  No comments found.      Goals:  Encounter Problems       Encounter Problems (Active)       OT Goals       Patient will complete transfer to chair with mod A. (Progressing)       Start:  07/02/24    Expected End:  07/16/24            Patient will complete grooming with set up. (Progressing)       Start:  07/02/24    Expected End:  07/16/24            Patient will complete bed mobility with min A. (Progressing)       Start:  07/02/24    Expected End:  07/16/24

## 2024-07-05 NOTE — PROGRESS NOTES
"Bing Holliday is a 63 y.o. female on day 7 of admission presenting with Adrenal insufficiency (Multi).    Subjective   NAEON. Patient seen and evaluated at bedside. She reports that her appetite continues to be decreased, but she otherwise feels good. Denies any chest pain, shortness of breath, fevers, or chills.    Objective     Physical Exam  Constitutional: No acute distress, appears fatigued  HEENT: anicteric sclera, eomi, dry mucous membranes   Cardiovascular: Irregularly irregular rhythm, no murmurs, 2+ equal pulses of the extremities, normal S1 and S2  Respiratory/Thorax: Patent airways, CTAB, normal breath sounds on room air.  Gastrointestinal: +BS, Nondistended, soft, non-tender, no rebound tenderness or guarding, no masses palpable, no organomegaly  Musculoskeletal: ROM intact, no joint swelling, normal strength  Extremities: normal extremities, no cyanosis, edema, lower extremities with skin tears that are covered by bandaging  Skin: warm and dry. No rashes nor lesions noted  Neurological: intermittently alert and oriented x3, intact senses, motor, response and reflexes, follows commands, no facial droop    Last Recorded Vitals  Blood pressure 127/84, pulse 78, temperature 35.5 °C (95.9 °F), temperature source Temporal, resp. rate 20, height 1.778 m (5' 10\"), weight 96 kg (211 lb 10.3 oz), SpO2 100%.  Intake/Output last 3 Shifts:  I/O last 3 completed shifts:  In: 1598.3 (16.6 mL/kg) [P.O.:300; I.V.:1298.3 (13.5 mL/kg)]  Out: 525 (5.5 mL/kg) [Urine:525 (0.2 mL/kg/hr)]  Weight: 96 kg     Relevant Results  Scheduled medications  atorvastatin, 40 mg, oral, Nightly  folic acid, 1 mg, oral, Daily  hydrocortisone, 10 mg, oral, Daily  [START ON 7/6/2024] hydrocortisone, 20 mg, oral, Daily  insulin lispro, 0-5 Units, subcutaneous, Before meals & nightly  levETIRAcetam, 500 mg, oral, q12h  melatonin, 5 mg, oral, Nightly  metoprolol tartrate, 25 mg, oral, BID  multivitamin with minerals, 1 tablet, oral, " Daily  pantoprazole, 40 mg, oral, Daily before breakfast  risperiDONE, 1 mg, oral, BID  sertraline, 25 mg, oral, Daily  sodium chloride, 1,000 mL, intravenous, Once  thiamine, 100 mg, oral, Daily      Continuous medications     PRN medications  PRN medications: dextrose, dextrose, glucagon, glucagon, magnesium sulfate, magnesium sulfate, OLANZapine, oxygen, potassium chloride  Results from last 7 days   Lab Units 07/05/24  0511 07/04/24  0443 07/03/24  0413   WBC AUTO x10*3/uL 7.4 9.7 8.5   RBC AUTO x10*6/uL 2.71* 2.73* 2.64*   HEMOGLOBIN g/dL 8.1* 8.3* 7.9*     Results from last 7 days   Lab Units 07/05/24  0511 07/04/24  0443 07/03/24  0413 07/02/24  1606 07/02/24  0346   SODIUM mmol/L 145 143 144 148* 149*   POTASSIUM mmol/L 4.0 4.0 4.0 4.2 3.6   CHLORIDE mmol/L 114* 114* 114* 117* 118*   CO2 mmol/L 23 22 24 25 23   BUN mg/dL 34* 41* 45* 42* 41*   CREATININE mg/dL 1.43* 1.66* 1.81* 1.88* 2.07*   CALCIUM mg/dL 8.1* 7.8* 7.8* 8.1* 7.8*   PHOSPHORUS mg/dL  --   --  2.1* 2.3* 2.6   MAGNESIUM mg/dL 1.74 2.02 2.08  --  1.83   BILIRUBIN TOTAL mg/dL 0.5 0.5 0.4  --  0.4   ALT U/L 14 14 15  --  17   AST U/L 13 14 13  --  14       Electrocardiogram, 12-lead PRN ACS symptoms    Result Date: 7/4/2024  Atrial fibrillation with rapid ventricular response Voltage criteria for left ventricular hypertrophy ST & T wave abnormality, consider inferolateral ischemia Abnormal ECG When compared with ECG of 30-JUN-2024 04:23, (unconfirmed) Atrial fibrillation has replaced Electronic atrial pacemaker Vent. rate has increased BY  43 BPM T wave inversion less evident in Anterior leads T wave inversion more evident in Lateral leads Confirmed by Cheko Mike ([id number]) on 7/4/2024 9:30:27 PM      Assessment/Plan   Principal Problem:    Adrenal insufficiency (Multi)  Active Problems:    Lupus (Multi)    Pancytopenia (Multi)    Septic shock (Multi)    Patient is a 63 y.o. 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 The patient has remained stable since downgrade from the ICU. Goal of ongoing hospitalization period is to safely transition patient from IV to oral home medications, endocrinology consult regarding patient's steroid/insulin regimen.     #Septic shock secondary to pneumonia - resolved  #Acute hypoxic respiratory failure secondary to CAP vs. Aspiration PNA - resolved  -Off pressors since 0600 on 6/30  -MRSA negative  -Blood cultures 6/28: No growth  -Urine culture 6/28: No growth  -Vancomycin (6/28-6/29), cefepime 6/28-6/28), Zosyn 6/29-7/1)  -Augmentin (7/2-07/04)  -Now on room air.  Maintain SpO2 greater than 92%  -Continue DuoNebs      #Metabolic Encephalopathy 2/2 adrenal insufficiency versus septic shock, improved  -History: Multiple admissions for AMS found to be in adrenal insufficiency, lupus cerebritis   -Home meds: Zoloft, Keppra 500 mg twice daily, meclizine, paliperidone  -CT head negative   -Continue risperidone, alternative for home paliperidone  -Continue Keppra 500mg BID  -Zyprexa 5mg q6 prn for agitation      #Elevated troponin with ST depressions likely 2/2 demand ischemia in setting of acute anemia and septic shock  #History of CAD status post CABG 2013  #History of HFmr EF (EF 40 to 45% 5/2024)  #History of atrial fibrillation on Eliquis  -Troponins 14, 18, 75, 64  -Continue atorvastatin 40 mg  -Continue home dose metoprolol 25mg  -Home Eliquis held, suspect this was in the setting of acute on chronic anemia.  May need to discuss with cardiology regarding resumption of home Eliquis for her atrial fibrillation. May need to consider atrial appendage closure.     #JED on CKD3, likely pre-renal in setting of decreased PO intake +/- ischemia ATN.   #Acute  on chronic hypernatremia, resolved  - History: CKD3 (bl Cr 1.5-1.9)  -Cr 1.43 today (down from 2.03 in the ED on admission)  -Daily RFP,Mg,Phos  -Electrolytes: Replete per protocol, goal K>4 Phos >3 Mg >2   -May consider resuming home Lasix prior to discharge if there are signs of volume overload     #Adrenal Insufficiency in setting of infection  #Hypoglycemia  #Uncontrolled type 2 diabetes  -History: recurrent adrenal insufficiency (hydrocortisone), SLE cerebritis and Jaccoud arthropathy on florinef  -Home meds: Lantus 10 units in a.m., sliding scale, Cortef 20 mg in a.m./20 mg p.m., flu cortisone 0.1 mg every other day  -sliding scale: SSI #1.  Per endocrinology patient is okay running in the 200s.  Sliding scale instructions adjusted.  Nursing communication order was placed by endocrinology to hold insulin for BGL less than 250.  -Endocrinology recommended hydrocortisone 20 mg in the morning and 10 mg in the afternoon  -previous leg wound infection from last hospitalization at [facility]. Consult wound care.      #Acute on chronic anemia of chronic disease, chronic pancytopenia   -History: Pancytopenia   -Home meds: Eliquis 2.5 mg twice daily on hold.   -Worsening thrombocytopenia-chronic versus sepsis versus Zosyn.  Will monitor.  -3 units of packed red blood cells since admission  -Daily CBC     #GERD - Protonix  #Osteoporosis - continue outpatient management, may benefit from bisphosphonate  -Continue home medications as tolerated      Global Plan of Care:   Fluids: 1 L half NS  Electrolytes: replete PRN  Lines/Access: None  Diet: Potassium restricted, sodium restricted  Consults: , Endocrinology, Nutrition, PT/OT, SLP, Palliative  DVT PPX: Held in the setting of anemia and thrombocytopenia  Code status: DNR and No Intubation  Emergency contact: López Lang (son) [phone] (Home Phone)     Dispo: Medically stable for [facility], will transfer. Pending precert.     The assessment and plan were discussed with the  attending physician,  Cheko Angel D.O. PGY-2

## 2024-07-05 NOTE — CARE PLAN
Patient remained hds throughout shift. Remains in afib and on RA. Patient A&Ox3 with hallucinations. No c/o pain. Safety maintained.     The clinical goals for the shift include Patient will remain HDS by end of shift 7/5/24 by 0700.      Problem: Skin  Goal: Decreased wound size/increased tissue granulation at next dressing change  Outcome: Progressing  Flowsheets (Taken 7/5/2024 0517)  Decreased wound size/increased tissue granulation at next dressing change:   Promote sleep for wound healing   Protective dressings over bony prominences  Goal: Participates in plan/prevention/treatment measures  Outcome: Progressing  Flowsheets (Taken 7/5/2024 0517)  Participates in plan/prevention/treatment measures:   Discuss with provider PT/OT consult   Elevate heels  Goal: Prevent/manage excess moisture  Outcome: Progressing  Flowsheets (Taken 7/5/2024 0517)  Prevent/manage excess moisture:   Cleanse incontinence/protect with barrier cream   Moisturize dry skin   Monitor for/manage infection if present   Follow provider orders for dressing changes  Goal: Prevent/minimize sheer/friction injuries  Outcome: Progressing  Flowsheets (Taken 7/5/2024 0517)  Prevent/minimize sheer/friction injuries:   Complete micro-shifts as needed if patient unable. Adjust patient position to relieve pressure points, not a full turn   Use pull sheet   HOB 30 degrees or less   Turn/reposition every 2 hours/use positioning/transfer devices  Goal: Promote/optimize nutrition  Outcome: Progressing  Flowsheets (Taken 7/5/2024 0517)  Promote/optimize nutrition:   Assist with feeding   Monitor/record intake including meals   Consume > 50% meals/supplements  Goal: Promote skin healing  Outcome: Progressing  Flowsheets (Taken 7/5/2024 0517)  Promote skin healing:   Assess skin/pad under line(s)/device(s)   Protective dressings over bony prominences   Ensure correct size (line/device) and apply per  instructions   Rotate device position/do not  position patient on device   Turn/reposition every 2 hours/use positioning/transfer devices     Problem: Pain - Adult  Goal: Verbalizes/displays adequate comfort level or baseline comfort level  Outcome: Progressing     Problem: Discharge Planning  Goal: Discharge to home or other facility with appropriate resources  Outcome: Progressing     Problem: Chronic Conditions and Co-morbidities  Goal: Patient's chronic conditions and co-morbidity symptoms are monitored and maintained or improved  Outcome: Progressing     Problem: Safety - Adult  Goal: Free from fall injury  Outcome: Progressing     Problem: Skin  Goal: Prevent/manage excess moisture  Recent Flowsheet Documentation  Taken 7/5/2024 0517 by Sierra Hernandez RN  Prevent/manage excess moisture:   Cleanse incontinence/protect with barrier cream   Moisturize dry skin   Monitor for/manage infection if present   Follow provider orders for dressing changes  Goal: Prevent/minimize sheer/friction injuries  Recent Flowsheet Documentation  Taken 7/5/2024 0517 by Sierra Hernandez RN  Prevent/minimize sheer/friction injuries:   Complete micro-shifts as needed if patient unable. Adjust patient position to relieve pressure points, not a full turn   Use pull sheet   HOB 30 degrees or less   Turn/reposition every 2 hours/use positioning/transfer devices  Goal: Promote/optimize nutrition  Recent Flowsheet Documentation  Taken 7/5/2024 0517 by Sierra Hernandez RN  Promote/optimize nutrition:   Assist with feeding   Monitor/record intake including meals   Consume > 50% meals/supplements  Goal: Promote skin healing  Recent Flowsheet Documentation  Taken 7/5/2024 0517 by Sierra Hernandez RN  Promote skin healing:   Assess skin/pad under line(s)/device(s)   Protective dressings over bony prominences   Ensure correct size (line/device) and apply per  instructions   Rotate device position/do not position patient on device   Turn/reposition every 2 hours/use  positioning/transfer devices

## 2024-07-06 LAB
ALBUMIN SERPL BCP-MCNC: 2.6 G/DL (ref 3.4–5)
ALP SERPL-CCNC: 118 U/L (ref 33–136)
ALT SERPL W P-5'-P-CCNC: 14 U/L (ref 7–45)
ANION GAP SERPL CALC-SCNC: 13 MMOL/L (ref 10–20)
AST SERPL W P-5'-P-CCNC: 14 U/L (ref 9–39)
BASOPHILS # BLD AUTO: 0.02 X10*3/UL (ref 0–0.1)
BASOPHILS NFR BLD AUTO: 0.3 %
BILIRUB SERPL-MCNC: 0.4 MG/DL (ref 0–1.2)
BUN SERPL-MCNC: 28 MG/DL (ref 6–23)
CALCIUM SERPL-MCNC: 7.9 MG/DL (ref 8.6–10.3)
CHLORIDE SERPL-SCNC: 113 MMOL/L (ref 98–107)
CO2 SERPL-SCNC: 22 MMOL/L (ref 21–32)
CREAT SERPL-MCNC: 1.42 MG/DL (ref 0.5–1.05)
EGFRCR SERPLBLD CKD-EPI 2021: 42 ML/MIN/1.73M*2
EOSINOPHIL # BLD AUTO: 0.04 X10*3/UL (ref 0–0.7)
EOSINOPHIL NFR BLD AUTO: 0.6 %
ERYTHROCYTE [DISTWIDTH] IN BLOOD BY AUTOMATED COUNT: 19.5 % (ref 11.5–14.5)
GLUCOSE BLD MANUAL STRIP-MCNC: 119 MG/DL (ref 74–99)
GLUCOSE BLD MANUAL STRIP-MCNC: 190 MG/DL (ref 74–99)
GLUCOSE BLD MANUAL STRIP-MCNC: 207 MG/DL (ref 74–99)
GLUCOSE BLD MANUAL STRIP-MCNC: 245 MG/DL (ref 74–99)
GLUCOSE BLD MANUAL STRIP-MCNC: 96 MG/DL (ref 74–99)
GLUCOSE SERPL-MCNC: 126 MG/DL (ref 74–99)
HCT VFR BLD AUTO: 27.8 % (ref 36–46)
HGB BLD-MCNC: 8.5 G/DL (ref 12–16)
IMM GRANULOCYTES # BLD AUTO: 0.29 X10*3/UL (ref 0–0.7)
IMM GRANULOCYTES NFR BLD AUTO: 4.7 % (ref 0–0.9)
LYMPHOCYTES # BLD AUTO: 1.71 X10*3/UL (ref 1.2–4.8)
LYMPHOCYTES NFR BLD AUTO: 27.6 %
MAGNESIUM SERPL-MCNC: 1.98 MG/DL (ref 1.6–2.4)
MCH RBC QN AUTO: 30.1 PG (ref 26–34)
MCHC RBC AUTO-ENTMCNC: 30.6 G/DL (ref 32–36)
MCV RBC AUTO: 99 FL (ref 80–100)
MONOCYTES # BLD AUTO: 0.31 X10*3/UL (ref 0.1–1)
MONOCYTES NFR BLD AUTO: 5 %
NEUTROPHILS # BLD AUTO: 3.82 X10*3/UL (ref 1.2–7.7)
NEUTROPHILS NFR BLD AUTO: 61.8 %
NRBC BLD-RTO: 5.5 /100 WBCS (ref 0–0)
PLATELET # BLD AUTO: 56 X10*3/UL (ref 150–450)
POTASSIUM SERPL-SCNC: 3.8 MMOL/L (ref 3.5–5.3)
PROT SERPL-MCNC: 5 G/DL (ref 6.4–8.2)
RBC # BLD AUTO: 2.82 X10*6/UL (ref 4–5.2)
SODIUM SERPL-SCNC: 144 MMOL/L (ref 136–145)
WBC # BLD AUTO: 6.2 X10*3/UL (ref 4.4–11.3)

## 2024-07-06 PROCEDURE — 2500000001 HC RX 250 WO HCPCS SELF ADMINISTERED DRUGS (ALT 637 FOR MEDICARE OP): Performed by: INTERNAL MEDICINE

## 2024-07-06 PROCEDURE — 85025 COMPLETE CBC W/AUTO DIFF WBC: CPT | Performed by: EMERGENCY MEDICINE

## 2024-07-06 PROCEDURE — 82947 ASSAY GLUCOSE BLOOD QUANT: CPT

## 2024-07-06 PROCEDURE — 1100000001 HC PRIVATE ROOM DAILY

## 2024-07-06 PROCEDURE — 2500000002 HC RX 250 W HCPCS SELF ADMINISTERED DRUGS (ALT 637 FOR MEDICARE OP, ALT 636 FOR OP/ED): Performed by: INTERNAL MEDICINE

## 2024-07-06 PROCEDURE — 2500000001 HC RX 250 WO HCPCS SELF ADMINISTERED DRUGS (ALT 637 FOR MEDICARE OP)

## 2024-07-06 PROCEDURE — 2500000002 HC RX 250 W HCPCS SELF ADMINISTERED DRUGS (ALT 637 FOR MEDICARE OP, ALT 636 FOR OP/ED)

## 2024-07-06 PROCEDURE — 80053 COMPREHEN METABOLIC PANEL: CPT | Performed by: EMERGENCY MEDICINE

## 2024-07-06 PROCEDURE — 36415 COLL VENOUS BLD VENIPUNCTURE: CPT | Performed by: EMERGENCY MEDICINE

## 2024-07-06 PROCEDURE — 83735 ASSAY OF MAGNESIUM: CPT | Performed by: EMERGENCY MEDICINE

## 2024-07-06 PROCEDURE — 2500000001 HC RX 250 WO HCPCS SELF ADMINISTERED DRUGS (ALT 637 FOR MEDICARE OP): Performed by: NURSE PRACTITIONER

## 2024-07-06 PROCEDURE — 99232 SBSQ HOSP IP/OBS MODERATE 35: CPT

## 2024-07-06 ASSESSMENT — COGNITIVE AND FUNCTIONAL STATUS - GENERAL
CLIMB 3 TO 5 STEPS WITH RAILING: TOTAL
DRESSING REGULAR LOWER BODY CLOTHING: TOTAL
MOVING TO AND FROM BED TO CHAIR: TOTAL
DAILY ACTIVITIY SCORE: 8
MOVING FROM LYING ON BACK TO SITTING ON SIDE OF FLAT BED WITH BEDRAILS: A LOT
EATING MEALS: A LOT
WALKING IN HOSPITAL ROOM: TOTAL
TURNING FROM BACK TO SIDE WHILE IN FLAT BAD: TOTAL
HELP NEEDED FOR BATHING: TOTAL
DRESSING REGULAR UPPER BODY CLOTHING: TOTAL
TOILETING: TOTAL
MOBILITY SCORE: 7
STANDING UP FROM CHAIR USING ARMS: TOTAL
PERSONAL GROOMING: A LOT

## 2024-07-06 ASSESSMENT — PAIN SCALES - GENERAL
PAINLEVEL_OUTOF10: 0 - NO PAIN

## 2024-07-06 ASSESSMENT — PAIN - FUNCTIONAL ASSESSMENT
PAIN_FUNCTIONAL_ASSESSMENT: 0-10

## 2024-07-06 NOTE — PROGRESS NOTES
"    Endocrinology Inpatient Consult Progress Note     PATIENT NAME: Bing Holliday  MRN: [id number]  DATE: 7/6/2024    CONSULTING PHYSICIAN: Dr. Zavaleta  REASON FOR CONSULT: AI. Abnormal TFTs.      Interval Events     No acute events overnight.  The patient states that she really does not much of an appetite.  She is doing her best to eat.  She says that she is probably finishing about a quarter of her plate.  Just does not really have the taste for food.  She also is complaining some hoarseness in her voice.      Physical Examination     /72 (BP Location: Left arm, Patient Position: Lying)   Pulse 74   Temp 35.7 °C (96.3 °F) (Temporal)   Resp 18   Ht 1.778 m (5' 10\")   Wt 95.2 kg (209 lb 14.1 oz)   LMP  (LMP Unknown)   SpO2 96%   BMI 30.11 kg/m²   No acute distress.  Appropriate affect.  Temporal wasting.  Frontal alopecia.  Regular rate and rhythm.  Nonlabored respiration.  Soft nontender nondistended abdomen.  No pedal edema bilaterally.      Medications     Reviewed MAR       Data     Recent Labs and Imaging Reviewed      Assessment / Plan        # Adrenal Insufficiency  Remains clinically improved.  Now on physiologic hydrocortisone.  We can consider switching over to prednisone to increase compliance if she does go home rather than a ECF.     # Hypoglycemia  # Uncontrolled Type 2 Diabetes Mellitus  Her sugars have been reviewed.  Not eating much.  No hypoglycemia.  No hypoglycemia.  Not requiring sliding scale.  Continue current regimen.      # Abnormal TFTs  As per my initial consult note.  No intervention necessary at this time.     # Osteoporosis  In the setting of chronic glucocorticoid use. This will be further managed as an outpatient. She probably would benefit from a bisphosphonate, these agents are best studied with steroid-induced adynamic bone disease.         Avi Sloan, DO  Endocrinology, Diabetes, and Metabolism    Available via [employer] Messenger    Please excuse any typographical " or unwanted errors within this documentation as voice recognition software was used to dictate this note.

## 2024-07-06 NOTE — NURSING NOTE
1720- Patient arrived on unit. Vitals and assessment done. Bed alarm on and call light within reach.    EOS- No acute changes since arriving on the unit. Patient denies any pain at this time. Safety maintained and call light within reach.

## 2024-07-06 NOTE — CARE PLAN
Pt remained HDS this RN shift, denied pain and nausea. Pt pulse ox remained >92% on R. Pt with poor appetite and poor PO intake, fluids encouraged. Pt mentation waxes and weans, remained disoriented to situation. Pt safety maintained, call light at reach. Pt ok to be t/x to F pending bed availability

## 2024-07-06 NOTE — PROGRESS NOTES
"Bing Holliday is a 63 y.o. female on day 8 of admission presenting with Adrenal insufficiency (Multi).    Subjective   NAEON. Patient seen and evaluated at bedside. Appetite has improved but remains diminished. Notes some hoarseness of voice, but this has been ongoing for several months. Denies any chest pain, shortness of breath, fevers, or chills.    Objective     Physical Exam  Constitutional: No acute distress, appears fatigued  HEENT: anicteric sclera, eomi, dry mucous membranes   Cardiovascular: Irregularly irregular rhythm, no murmurs, 2+ equal pulses of the extremities, normal S1 and S2  Respiratory/Thorax: Patent airways, CTAB, normal breath sounds on room air.  Gastrointestinal: +BS, Nondistended, soft, non-tender, no rebound tenderness or guarding, no masses palpable, no organomegaly  Musculoskeletal: ROM intact, no joint swelling, normal strength  Extremities: normal extremities, no cyanosis, edema, lower extremities with skin tears that are covered by bandaging  Skin: warm and dry. No rashes nor lesions noted  Neurological: intermittently alert and oriented x3, intact senses, motor, response and reflexes, follows commands, no facial droop    Last Recorded Vitals  Blood pressure 126/72, pulse 74, temperature 35.7 °C (96.3 °F), temperature source Temporal, resp. rate 18, height 1.778 m (5' 10\"), weight 95.2 kg (209 lb 14.1 oz), SpO2 96%.  Intake/Output last 3 Shifts:  I/O last 3 completed shifts:  In: 700 (7.4 mL/kg) [P.O.:700]  Out: 575 (6 mL/kg) [Urine:575 (0.2 mL/kg/hr)]  Weight: 95.2 kg     Relevant Results  Scheduled medications  atorvastatin, 40 mg, oral, Nightly  folic acid, 1 mg, oral, Daily  hydrocortisone, 10 mg, oral, Daily  hydrocortisone, 20 mg, oral, Daily  insulin lispro, 0-5 Units, subcutaneous, Before meals & nightly  levETIRAcetam, 500 mg, oral, q12h  melatonin, 5 mg, oral, Nightly  metoprolol tartrate, 25 mg, oral, BID  multivitamin with minerals, 1 tablet, oral, Daily  pantoprazole, " 40 mg, oral, Daily before breakfast  risperiDONE, 1 mg, oral, BID  sertraline, 25 mg, oral, Daily  thiamine, 100 mg, oral, Daily    Continuous medications     PRN medications  PRN medications: dextrose, dextrose, glucagon, glucagon, magnesium sulfate, magnesium sulfate, OLANZapine, oxygen, potassium chloride  Results from last 7 days   Lab Units 07/06/24  0458 07/05/24  0511 07/04/24  0443   WBC AUTO x10*3/uL 6.2 7.4 9.7   RBC AUTO x10*6/uL 2.82* 2.71* 2.73*   HEMOGLOBIN g/dL 8.5* 8.1* 8.3*     Results from last 7 days   Lab Units 07/06/24  0458 07/05/24  0511 07/04/24  0443 07/03/24  0413 07/02/24  1606 07/02/24  0346   SODIUM mmol/L 144 145 143 144 148* 149*   POTASSIUM mmol/L 3.8 4.0 4.0 4.0 4.2 3.6   CHLORIDE mmol/L 113* 114* 114* 114* 117* 118*   CO2 mmol/L 22 23 22 24 25 23   BUN mg/dL 28* 34* 41* 45* 42* 41*   CREATININE mg/dL 1.42* 1.43* 1.66* 1.81* 1.88* 2.07*   CALCIUM mg/dL 7.9* 8.1* 7.8* 7.8* 8.1* 7.8*   PHOSPHORUS mg/dL  --   --   --  2.1* 2.3* 2.6   MAGNESIUM mg/dL 1.98 1.74 2.02 2.08  --  1.83   BILIRUBIN TOTAL mg/dL 0.4 0.5 0.5 0.4  --  0.4   ALT U/L 14 14 14 15  --  17   AST U/L 14 13 14 13  --  14       No results found.     Assessment/Plan   Principal Problem:    Adrenal insufficiency (Multi)  Active Problems:    Lupus (Multi)    Pancytopenia (Multi)    Septic shock (Multi)    Patient is a 63 y.o. 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 The patient has remained stable since downgrade from the ICU. Goal of ongoing hospitalization period is to safely transition patient from IV to oral home medications, endocrinology consult regarding patient's steroid/insulin regimen.     #Septic shock secondary to pneumonia - resolved  #Acute  hypoxic respiratory failure secondary to CAP vs. Aspiration PNA - resolved  -Off pressors since 0600 on 6/30  -MRSA negative  -Blood cultures 6/28: No growth  -Urine culture 6/28: No growth  -Vancomycin (6/28-6/29), cefepime 6/28-6/28), Zosyn 6/29-7/1)  -Augmentin (7/2-07/04)  -Now on room air.  Maintain SpO2 greater than 92%  -Continue DuoNebs      #Metabolic Encephalopathy 2/2 adrenal insufficiency versus septic shock, improved  -History: Multiple admissions for AMS found to be in adrenal insufficiency, lupus cerebritis   -Home meds: Zoloft, Keppra 500 mg twice daily, meclizine, paliperidone  -CT head negative  -Continue risperidone, alternative for home paliperidone  -Continue Keppra 500mg BID  -Zyprexa 5mg q6 prn for agitation      #Elevated troponin with ST depressions likely 2/2 demand ischemia in setting of acute anemia and septic shock  #History of CAD status post CABG 2013  #History of HFmr EF (EF 40 to 45% 5/2024)  #History of atrial fibrillation on Eliquis  -Troponins 14, 18, 75, 64  -Continue atorvastatin 40 mg  -Continue home dose metoprolol 25mg  -Home Eliquis held, suspect this was in the setting of acute on chronic anemia.  Will hold on discharge, will need to discuss with cardiology regarding resumption of home Eliquis for her atrial fibrillation. May need to consider atrial appendage closure.     #JED on CKD3, likely pre-renal in setting of decreased PO intake +/- ischemia ATN.   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  Per endocrinology patient is okay running in the 200s.  Sliding scale instructions adjusted.  Nursing communication order was placed by endocrinology to hold insulin for BGL less than 250.  -Endocrinology recommended hydrocortisone 20 mg in the morning and 10 mg in the afternoon  -previous leg wound infection from last hospitalization at [facility]. Consult wound care.      #Acute on chronic anemia of chronic disease, chronic pancytopenia   -History: Pancytopenia   -Home meds: Eliquis 2.5 mg twice daily on hold  -Worsening thrombocytopenia-chronic versus sepsis versus Zosyn.  Will monitor.  -3 units of packed red blood cells since admission  -Daily CBC     #GERD - Protonix  #Osteoporosis - continue outpatient management, may benefit from bisphosphonate  -Continue home medications as tolerated      Global Plan of Care:   Fluids: PRN  Electrolytes: replete PRN  Diet: Regular  Consults: Wound care, Endocrinology, Nutrition, PT/OT, SLP, Palliative  DVT PPX: Held in the setting of anemia and thrombocytopenia  Code status: DNR and No Intubation  Emergency contact: López Lang (son) [phone] (Home Phone)     Dispo: Medically stable. Pending precert.     The assessment and plan were discussed with the attending physician,  Cheko Angel D.O. PGY-2

## 2024-07-07 LAB
ALBUMIN SERPL BCP-MCNC: 2.7 G/DL (ref 3.4–5)
ALP SERPL-CCNC: 131 U/L (ref 33–136)
ALT SERPL W P-5'-P-CCNC: 15 U/L (ref 7–45)
ANION GAP SERPL CALC-SCNC: 11 MMOL/L (ref 10–20)
AST SERPL W P-5'-P-CCNC: 14 U/L (ref 9–39)
ATRIAL RATE: 63 BPM
BASOPHILS # BLD AUTO: 0 X10*3/UL (ref 0–0.1)
BASOPHILS NFR BLD AUTO: 0 %
BILIRUB SERPL-MCNC: 0.4 MG/DL (ref 0–1.2)
BUN SERPL-MCNC: 25 MG/DL (ref 6–23)
CALCIUM SERPL-MCNC: 7.9 MG/DL (ref 8.6–10.3)
CHLORIDE SERPL-SCNC: 113 MMOL/L (ref 98–107)
CO2 SERPL-SCNC: 24 MMOL/L (ref 21–32)
CREAT SERPL-MCNC: 1.37 MG/DL (ref 0.5–1.05)
EGFRCR SERPLBLD CKD-EPI 2021: 44 ML/MIN/1.73M*2
EOSINOPHIL # BLD AUTO: 0.04 X10*3/UL (ref 0–0.7)
EOSINOPHIL NFR BLD AUTO: 0.7 %
ERYTHROCYTE [DISTWIDTH] IN BLOOD BY AUTOMATED COUNT: 19.9 % (ref 11.5–14.5)
GLUCOSE BLD MANUAL STRIP-MCNC: 103 MG/DL (ref 74–99)
GLUCOSE BLD MANUAL STRIP-MCNC: 111 MG/DL (ref 74–99)
GLUCOSE BLD MANUAL STRIP-MCNC: 122 MG/DL (ref 74–99)
GLUCOSE BLD MANUAL STRIP-MCNC: 184 MG/DL (ref 74–99)
GLUCOSE SERPL-MCNC: 102 MG/DL (ref 74–99)
HCT VFR BLD AUTO: 29.5 % (ref 36–46)
HGB BLD-MCNC: 8.8 G/DL (ref 12–16)
IMM GRANULOCYTES # BLD AUTO: 0.17 X10*3/UL (ref 0–0.7)
IMM GRANULOCYTES NFR BLD AUTO: 2.9 % (ref 0–0.9)
LYMPHOCYTES # BLD AUTO: 1.7 X10*3/UL (ref 1.2–4.8)
LYMPHOCYTES NFR BLD AUTO: 29.3 %
MAGNESIUM SERPL-MCNC: 1.78 MG/DL (ref 1.6–2.4)
MCH RBC QN AUTO: 29.9 PG (ref 26–34)
MCHC RBC AUTO-ENTMCNC: 29.8 G/DL (ref 32–36)
MCV RBC AUTO: 100 FL (ref 80–100)
MONOCYTES # BLD AUTO: 0.32 X10*3/UL (ref 0.1–1)
MONOCYTES NFR BLD AUTO: 5.5 %
NEUTROPHILS # BLD AUTO: 3.57 X10*3/UL (ref 1.2–7.7)
NEUTROPHILS NFR BLD AUTO: 61.6 %
NRBC BLD-RTO: 6.6 /100 WBCS (ref 0–0)
P AXIS: 25 DEGREES
P OFFSET: 161 MS
P ONSET: 136 MS
PLATELET # BLD AUTO: 77 X10*3/UL (ref 150–450)
POTASSIUM SERPL-SCNC: 3.6 MMOL/L (ref 3.5–5.3)
PR INTERVAL: 272 MS
PROT SERPL-MCNC: 5.3 G/DL (ref 6.4–8.2)
Q ONSET: 214 MS
QRS COUNT: 10 BEATS
QRS DURATION: 100 MS
QT INTERVAL: 440 MS
QTC CALCULATION(BAZETT): 442 MS
QTC FREDERICIA: 442 MS
R AXIS: 26 DEGREES
RBC # BLD AUTO: 2.94 X10*6/UL (ref 4–5.2)
SODIUM SERPL-SCNC: 144 MMOL/L (ref 136–145)
T AXIS: -16 DEGREES
T OFFSET: 434 MS
VENTRICULAR RATE: 61 BPM
WBC # BLD AUTO: 5.8 X10*3/UL (ref 4.4–11.3)

## 2024-07-07 PROCEDURE — 2500000001 HC RX 250 WO HCPCS SELF ADMINISTERED DRUGS (ALT 637 FOR MEDICARE OP)

## 2024-07-07 PROCEDURE — 2500000002 HC RX 250 W HCPCS SELF ADMINISTERED DRUGS (ALT 637 FOR MEDICARE OP, ALT 636 FOR OP/ED)

## 2024-07-07 PROCEDURE — 2500000002 HC RX 250 W HCPCS SELF ADMINISTERED DRUGS (ALT 637 FOR MEDICARE OP, ALT 636 FOR OP/ED): Performed by: INTERNAL MEDICINE

## 2024-07-07 PROCEDURE — 2500000001 HC RX 250 WO HCPCS SELF ADMINISTERED DRUGS (ALT 637 FOR MEDICARE OP): Performed by: NURSE PRACTITIONER

## 2024-07-07 PROCEDURE — 83735 ASSAY OF MAGNESIUM: CPT | Performed by: EMERGENCY MEDICINE

## 2024-07-07 PROCEDURE — 99232 SBSQ HOSP IP/OBS MODERATE 35: CPT

## 2024-07-07 PROCEDURE — 1100000001 HC PRIVATE ROOM DAILY

## 2024-07-07 PROCEDURE — 85025 COMPLETE CBC W/AUTO DIFF WBC: CPT | Performed by: EMERGENCY MEDICINE

## 2024-07-07 PROCEDURE — 80053 COMPREHEN METABOLIC PANEL: CPT | Performed by: EMERGENCY MEDICINE

## 2024-07-07 PROCEDURE — 36415 COLL VENOUS BLD VENIPUNCTURE: CPT | Performed by: EMERGENCY MEDICINE

## 2024-07-07 PROCEDURE — 82947 ASSAY GLUCOSE BLOOD QUANT: CPT

## 2024-07-07 ASSESSMENT — COGNITIVE AND FUNCTIONAL STATUS - GENERAL
EATING MEALS: A LITTLE
STANDING UP FROM CHAIR USING ARMS: TOTAL
DRESSING REGULAR LOWER BODY CLOTHING: TOTAL
PERSONAL GROOMING: A LITTLE
TURNING FROM BACK TO SIDE WHILE IN FLAT BAD: TOTAL
EATING MEALS: A LITTLE
DAILY ACTIVITIY SCORE: 11
TOILETING: TOTAL
HELP NEEDED FOR BATHING: TOTAL
HELP NEEDED FOR BATHING: TOTAL
CLIMB 3 TO 5 STEPS WITH RAILING: TOTAL
MOVING FROM LYING ON BACK TO SITTING ON SIDE OF FLAT BED WITH BEDRAILS: A LOT
STANDING UP FROM CHAIR USING ARMS: TOTAL
MOVING TO AND FROM BED TO CHAIR: TOTAL
DAILY ACTIVITIY SCORE: 11
TOILETING: TOTAL
DRESSING REGULAR UPPER BODY CLOTHING: A LOT
TURNING FROM BACK TO SIDE WHILE IN FLAT BAD: TOTAL
CLIMB 3 TO 5 STEPS WITH RAILING: TOTAL
MOBILITY SCORE: 7
WALKING IN HOSPITAL ROOM: TOTAL
PERSONAL GROOMING: A LITTLE
DRESSING REGULAR UPPER BODY CLOTHING: A LOT
MOVING TO AND FROM BED TO CHAIR: TOTAL
MOVING FROM LYING ON BACK TO SITTING ON SIDE OF FLAT BED WITH BEDRAILS: A LOT
DRESSING REGULAR LOWER BODY CLOTHING: TOTAL
MOBILITY SCORE: 7
WALKING IN HOSPITAL ROOM: TOTAL

## 2024-07-07 ASSESSMENT — PAIN SCALES - GENERAL
PAINLEVEL_OUTOF10: 0 - NO PAIN
PAINLEVEL_OUTOF10: 0 - NO PAIN

## 2024-07-07 ASSESSMENT — PAIN - FUNCTIONAL ASSESSMENT: PAIN_FUNCTIONAL_ASSESSMENT: 0-10

## 2024-07-07 NOTE — PROGRESS NOTES
07/07/24 0945   Discharge Planning   Home or Post Acute Services Post acute facilities (Rehab/SNF/etc)   Type of Post Acute Facility Services Skilled nursing   Patient expects to be discharged to: return to [facility]   Patient Choice   Patient / Family choosing to utilize agency / facility established prior to hospitalization Yes     Request sent to the direct precert team to start precert for patient to return to [facility].

## 2024-07-07 NOTE — PROGRESS NOTES
"    Endocrinology Inpatient Consult Progress Note     PATIENT NAME: Bing Holliday  MRN: [id number]  DATE: 7/7/2024    CONSULTING PHYSICIAN: Dr. Zavaleta  REASON FOR CONSULT: AI. Abnormal TFTs.      Interval Events     Much more interactive today.  In good spirits.  She states that she had all of her breakfast.  She is still feeling fatigued.  She once again was complaining about hoarseness in her voice.  She has not ambulated since being here.      Physical Examination     /85 (BP Location: Right arm, Patient Position: Lying)   Pulse 65   Temp 35.9 °C (96.6 °F) (Temporal)   Resp 18   Ht 1.778 m (5' 10\")   Wt 96.2 kg (212 lb 1.3 oz)   LMP  (LMP Unknown)   SpO2 97%   BMI 30.43 kg/m²   No acute distress.  Appropriate affect.  Frontal alopecia.  Regular rate and rhythm.  Nonlabored respiration.  Soft nontender nondistended abdomen.  No pedal edema bilaterally.      Medications     Reviewed MAR       Data     Recent Labs and Imaging Reviewed      Assessment / Plan        # Adrenal Insufficiency  Remains clinically improved.  Now on physiologic hydrocortisone.  We can consider switching over to prednisone to increase compliance if she does go home rather than a ECF.     # Hypoglycemia  # Uncontrolled Type 2 Diabetes Mellitus  Her sugars have been reviewed.  She reportedly ate all of her breakfast this morning..  No hypoglycemia.  No hyperglycemia.  Not requiring sliding scale.  Continue current regimen.      # Abnormal TFTs  As per my initial consult note.  No intervention necessary at this time.     # Osteoporosis  In the setting of chronic glucocorticoid use. This will be further managed as an outpatient. She probably would benefit from a bisphosphonate, these agents are best studied with steroid-induced adynamic bone disease.         Avi Sloan, DO  Endocrinology, Diabetes, and Metabolism    Available via [employer] Messenger    Please excuse any typographical or unwanted errors within this documentation " as voice recognition software was used to dictate this note.

## 2024-07-07 NOTE — PROGRESS NOTES
"Bing Holliday is a 63 y.o. female on day 9 of admission presenting with Adrenal insufficiency (Multi).    Subjective   NAEON. Patient seen and evaluated at bedside. She has been having some visual hallucinations, but has had these on previous admissions and is able to discern when hallucinations are occurring. Denies any chest pain, shortness of breath, fevers, or chills.    Objective     Physical Exam  Constitutional: No acute distress, appears fatigued  HEENT: anicteric sclera, eomi, dry mucous membranes   Cardiovascular: Irregularly irregular rhythm, no murmurs, 2+ equal pulses of the extremities, normal S1 and S2  Respiratory/Thorax: Patent airways, CTAB, normal breath sounds on room air.  Gastrointestinal: +BS, Nondistended, soft, non-tender, no rebound tenderness or guarding, no masses palpable, no organomegaly  Musculoskeletal: ROM intact, no joint swelling, normal strength  Extremities: normal extremities, no cyanosis, edema, lower extremities with skin tears that are covered by bandaging  Skin: warm and dry.  Neurological: Alert and oriented x3, intact senses, motor, response and reflexes, follows commands, no facial droop    Last Recorded Vitals  Blood pressure 97/78, pulse 62, temperature 35.9 °C (96.6 °F), temperature source Temporal, resp. rate 18, height 1.778 m (5' 10\"), weight 96.2 kg (212 lb 1.3 oz), SpO2 99%.  Intake/Output last 3 Shifts:  I/O last 3 completed shifts:  In: 420 (4.4 mL/kg) [P.O.:420]  Out: 850 (8.8 mL/kg) [Urine:850 (0.2 mL/kg/hr)]  Weight: 96.2 kg     Relevant Results  Scheduled medications  atorvastatin, 40 mg, oral, Nightly  folic acid, 1 mg, oral, Daily  hydrocortisone, 10 mg, oral, Daily  hydrocortisone, 20 mg, oral, Daily  insulin lispro, 0-5 Units, subcutaneous, Before meals & nightly  levETIRAcetam, 500 mg, oral, q12h  melatonin, 5 mg, oral, Nightly  metoprolol tartrate, 25 mg, oral, BID  multivitamin with minerals, 1 tablet, oral, Daily  pantoprazole, 40 mg, oral, Daily " before breakfast  risperiDONE, 1 mg, oral, BID  sertraline, 25 mg, oral, Daily  thiamine, 100 mg, oral, Daily    Continuous medications     PRN medications  PRN medications: dextrose, dextrose, glucagon, glucagon, magnesium sulfate, magnesium sulfate, OLANZapine, oxygen, potassium chloride  Results from last 7 days   Lab Units 07/07/24  0743 07/06/24  0458 07/05/24  0511   WBC AUTO x10*3/uL 5.8 6.2 7.4   RBC AUTO x10*6/uL 2.94* 2.82* 2.71*   HEMOGLOBIN g/dL 8.8* 8.5* 8.1*     Results from last 7 days   Lab Units 07/07/24  0743 07/06/24  0458 07/05/24  0511 07/04/24  0443 07/03/24  0413 07/02/24  1606 07/02/24  0346   SODIUM mmol/L 144 144 145   < > 144 148* 149*   POTASSIUM mmol/L 3.6 3.8 4.0   < > 4.0 4.2 3.6   CHLORIDE mmol/L 113* 113* 114*   < > 114* 117* 118*   CO2 mmol/L 24 22 23   < > 24 25 23   BUN mg/dL 25* 28* 34*   < > 45* 42* 41*   CREATININE mg/dL 1.37* 1.42* 1.43*   < > 1.81* 1.88* 2.07*   CALCIUM mg/dL 7.9* 7.9* 8.1*   < > 7.8* 8.1* 7.8*   PHOSPHORUS mg/dL  --   --   --   --  2.1* 2.3* 2.6   MAGNESIUM mg/dL 1.78 1.98 1.74   < > 2.08  --  1.83   BILIRUBIN TOTAL mg/dL 0.4 0.4 0.5   < > 0.4  --  0.4   ALT U/L 15 14 14   < > 15  --  17   AST U/L 14 14 13   < > 13  --  14    < > = values in this interval not displayed.       No results found.     Assessment/Plan   Principal Problem:    Adrenal insufficiency (Multi)  Active Problems:    Lupus (Multi)    Pancytopenia (Multi)    Septic shock (Multi)    Patient is a 63 y.o. 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 The patient has remained stable since downgrade from the ICU. Goal of ongoing hospitalization period is to safely transition patient from IV to oral home medications,  endocrinology consult regarding patient's steroid/insulin regimen.     #Septic shock secondary to pneumonia - resolved  #Acute hypoxic respiratory failure secondary to CAP vs. Aspiration PNA - resolved  -Off pressors since 0600 on 6/30  -MRSA negative  -Blood cultures 6/28: No growth  -Urine culture 6/28: No growth  -Vancomycin (6/28-6/29), cefepime 6/28-6/28), Zosyn 6/29-7/1)  -Augmentin (7/2-07/04)  -Now on room air.  Maintain SpO2 greater than 92%  -Continue DuoNebs      #Metabolic Encephalopathy 2/2 adrenal insufficiency versus septic shock, improved  -History: Multiple admissions for AMS found to be in adrenal insufficiency, lupus cerebritis   -Home meds: Zoloft, Keppra 500 mg twice daily, meclizine, paliperidone  -CT head negative  -Continue risperidone, alternative for home paliperidone  -Continue Keppra 500mg BID  -Zyprexa 5mg q6 prn for agitation      #Elevated troponin with ST depressions likely 2/2 demand ischemia in setting of acute anemia and septic shock  #History of CAD status post CABG 2013  #History of HFmr EF (EF 40 to 45% 5/2024)  #History of atrial fibrillation on Eliquis  -Troponins 14, 18, 75, 64  -Continue atorvastatin 40 mg  -Continue home dose metoprolol 25mg  -Home Eliquis held, suspect this was in the setting of acute on chronic anemia.  Will hold on discharge, will need to discuss with cardiology regarding resumption of home Eliquis for her atrial fibrillation. May need to consider atrial appendage closure.     #JED on CKD3, likely pre-renal in setting of decreased PO intake +/- ischemia ATN.   #Acute on chronic hypernatremia, resolved  -History: CKD3 (bl Cr 1.5-1.9)  -Cr stable at baseline  -Daily RFP, Mg, Phos  -Electrolytes: Replete per protocol, goal K>4 Phos >3 Mg >2   -May consider resuming home Lasix prior to discharge if there are signs of volume overload     #Adrenal Insufficiency in setting of infection  #Hypoglycemia  #Uncontrolled type 2 diabetes  -History: recurrent adrenal  insufficiency (hydrocortisone), SLE cerebritis and Jaccoud arthropathy on florinef  -Home meds: Lantus 10 units in a.m., sliding scale, Cortef 20 mg in a.m./20 mg p.m., flu cortisone 0.1 mg every other day  -sliding scale: SSI #1.  Per endocrinology patient is okay running in the 200s.  Sliding scale instructions adjusted.  Nursing communication order was placed by endocrinology to hold insulin for BGL less than 250.  -Endocrinology recommended hydrocortisone 20 mg in the morning and 10 mg in the afternoon  -previous leg wound infection from last hospitalization at [facility]. Consult wound care.      #Acute on chronic anemia of chronic disease, chronic pancytopenia   -History: Pancytopenia   -Home meds: Eliquis 2.5 mg twice daily on hold  -Worsening thrombocytopenia-chronic versus sepsis versus Zosyn.  Will monitor.  -3 units of packed red blood cells since admission  -Daily CBC     #GERD - Protonix  #Osteoporosis - continue outpatient management, may benefit from bisphosphonate  -Continue home medications as tolerated      Global Plan of Care:   Fluids: PRN  Electrolytes: replete PRN  Diet: Regular  Consults: Wound care, Endocrinology, Nutrition, PT/OT, SLP, Palliative  DVT PPX: Held in the setting of anemia and thrombocytopenia  Code status: DNR and No Intubation  Emergency contact: López Lang (son) [phone] (Home Phone)     Dispo: Medically stable. Pending precert.     The assessment and plan were discussed with the attending physician,  Cheko Angel D.O. PGY-2

## 2024-07-07 NOTE — NURSING NOTE
0900- Dr Angel on the unit and notified him that patient is still having hallucinations.     EOS- Patient continued to have hallucinations throughout the shift. Patient is aware that she is in the hospital but feels she is somewhere else. Patient attempted to get out of bed this afternoon and was able to stand but nothing more. Patient able to answer questions appropriately. Patient only eating about 25% of meals today. No complaints of pain or shortness of breath. Safety maintained and call light within reach.

## 2024-07-07 NOTE — NURSING NOTE
EOS:  Slept well through the night, oriented to place, forgetful about day and time, reorients easily.  No complaints, VSS.  External catheter remains in place.

## 2024-07-08 ENCOUNTER — APPOINTMENT (OUTPATIENT)
Dept: RADIOLOGY | Facility: HOSPITAL | Age: 64
DRG: 871 | End: 2024-07-08
Payer: MEDICARE

## 2024-07-08 ENCOUNTER — APPOINTMENT (OUTPATIENT)
Dept: CARDIOLOGY | Facility: HOSPITAL | Age: 64
DRG: 871 | End: 2024-07-08
Payer: MEDICARE

## 2024-07-08 LAB
ACANTHOCYTES BLD QL SMEAR: NORMAL
ALBUMIN SERPL BCP-MCNC: 2.6 G/DL (ref 3.4–5)
ANION GAP SERPL CALC-SCNC: 9 MMOL/L (ref 10–20)
BASOPHILS # BLD AUTO: 0.01 X10*3/UL (ref 0–0.1)
BASOPHILS NFR BLD AUTO: 0.1 %
BUN SERPL-MCNC: 24 MG/DL (ref 6–23)
CALCIUM SERPL-MCNC: 8.2 MG/DL (ref 8.6–10.3)
CHLORIDE SERPL-SCNC: 113 MMOL/L (ref 98–107)
CO2 SERPL-SCNC: 27 MMOL/L (ref 21–32)
CREAT SERPL-MCNC: 1.44 MG/DL (ref 0.5–1.05)
DACRYOCYTES BLD QL SMEAR: NORMAL
EGFRCR SERPLBLD CKD-EPI 2021: 41 ML/MIN/1.73M*2
EOSINOPHIL # BLD AUTO: 0.01 X10*3/UL (ref 0–0.7)
EOSINOPHIL NFR BLD AUTO: 0.1 %
ERYTHROCYTE [DISTWIDTH] IN BLOOD BY AUTOMATED COUNT: 20.3 % (ref 11.5–14.5)
GLUCOSE BLD MANUAL STRIP-MCNC: 106 MG/DL (ref 74–99)
GLUCOSE BLD MANUAL STRIP-MCNC: 124 MG/DL (ref 74–99)
GLUCOSE BLD MANUAL STRIP-MCNC: 124 MG/DL (ref 74–99)
GLUCOSE BLD MANUAL STRIP-MCNC: 130 MG/DL (ref 74–99)
GLUCOSE BLD MANUAL STRIP-MCNC: 132 MG/DL (ref 74–99)
GLUCOSE SERPL-MCNC: 124 MG/DL (ref 74–99)
HCT VFR BLD AUTO: 30.2 % (ref 36–46)
HGB BLD-MCNC: 9 G/DL (ref 12–16)
IMM GRANULOCYTES # BLD AUTO: 0.07 X10*3/UL (ref 0–0.7)
IMM GRANULOCYTES NFR BLD AUTO: 1 % (ref 0–0.9)
LYMPHOCYTES # BLD AUTO: 1.38 X10*3/UL (ref 1.2–4.8)
LYMPHOCYTES NFR BLD AUTO: 20.4 %
MAGNESIUM SERPL-MCNC: 1.62 MG/DL (ref 1.6–2.4)
MCH RBC QN AUTO: 30.2 PG (ref 26–34)
MCHC RBC AUTO-ENTMCNC: 29.8 G/DL (ref 32–36)
MCV RBC AUTO: 101 FL (ref 80–100)
MONOCYTES # BLD AUTO: 0.45 X10*3/UL (ref 0.1–1)
MONOCYTES NFR BLD AUTO: 6.7 %
NEUTROPHILS # BLD AUTO: 4.84 X10*3/UL (ref 1.2–7.7)
NEUTROPHILS NFR BLD AUTO: 71.7 %
NRBC BLD-RTO: 4 /100 WBCS (ref 0–0)
OVALOCYTES BLD QL SMEAR: NORMAL
PHOSPHATE SERPL-MCNC: 3.3 MG/DL (ref 2.5–4.9)
PLATELET # BLD AUTO: 89 X10*3/UL (ref 150–450)
POLYCHROMASIA BLD QL SMEAR: NORMAL
POTASSIUM SERPL-SCNC: 3.7 MMOL/L (ref 3.5–5.3)
RBC # BLD AUTO: 2.98 X10*6/UL (ref 4–5.2)
RBC MORPH BLD: NORMAL
SODIUM SERPL-SCNC: 145 MMOL/L (ref 136–145)
WBC # BLD AUTO: 6.8 X10*3/UL (ref 4.4–11.3)

## 2024-07-08 PROCEDURE — 2500000001 HC RX 250 WO HCPCS SELF ADMINISTERED DRUGS (ALT 637 FOR MEDICARE OP)

## 2024-07-08 PROCEDURE — 36415 COLL VENOUS BLD VENIPUNCTURE: CPT

## 2024-07-08 PROCEDURE — 83735 ASSAY OF MAGNESIUM: CPT

## 2024-07-08 PROCEDURE — 80069 RENAL FUNCTION PANEL: CPT

## 2024-07-08 PROCEDURE — 1100000001 HC PRIVATE ROOM DAILY

## 2024-07-08 PROCEDURE — 2500000001 HC RX 250 WO HCPCS SELF ADMINISTERED DRUGS (ALT 637 FOR MEDICARE OP): Performed by: NURSE PRACTITIONER

## 2024-07-08 PROCEDURE — 70450 CT HEAD/BRAIN W/O DYE: CPT

## 2024-07-08 PROCEDURE — 2500000001 HC RX 250 WO HCPCS SELF ADMINISTERED DRUGS (ALT 637 FOR MEDICARE OP): Performed by: INTERNAL MEDICINE

## 2024-07-08 PROCEDURE — 2500000002 HC RX 250 W HCPCS SELF ADMINISTERED DRUGS (ALT 637 FOR MEDICARE OP, ALT 636 FOR OP/ED)

## 2024-07-08 PROCEDURE — 97530 THERAPEUTIC ACTIVITIES: CPT | Mod: GP,CQ

## 2024-07-08 PROCEDURE — 99232 SBSQ HOSP IP/OBS MODERATE 35: CPT

## 2024-07-08 PROCEDURE — 2500000002 HC RX 250 W HCPCS SELF ADMINISTERED DRUGS (ALT 637 FOR MEDICARE OP, ALT 636 FOR OP/ED): Performed by: INTERNAL MEDICINE

## 2024-07-08 PROCEDURE — 82947 ASSAY GLUCOSE BLOOD QUANT: CPT

## 2024-07-08 PROCEDURE — 85025 COMPLETE CBC W/AUTO DIFF WBC: CPT

## 2024-07-08 PROCEDURE — 93005 ELECTROCARDIOGRAM TRACING: CPT

## 2024-07-08 PROCEDURE — 70450 CT HEAD/BRAIN W/O DYE: CPT | Performed by: STUDENT IN AN ORGANIZED HEALTH CARE EDUCATION/TRAINING PROGRAM

## 2024-07-08 PROCEDURE — 97110 THERAPEUTIC EXERCISES: CPT | Mod: GP,CQ

## 2024-07-08 ASSESSMENT — COGNITIVE AND FUNCTIONAL STATUS - GENERAL
MOVING FROM LYING ON BACK TO SITTING ON SIDE OF FLAT BED WITH BEDRAILS: A LITTLE
WALKING IN HOSPITAL ROOM: TOTAL
EATING MEALS: A LITTLE
MOVING TO AND FROM BED TO CHAIR: A LOT
DAILY ACTIVITIY SCORE: 15
WALKING IN HOSPITAL ROOM: TOTAL
WALKING IN HOSPITAL ROOM: TOTAL
HELP NEEDED FOR BATHING: A LOT
MOVING FROM LYING ON BACK TO SITTING ON SIDE OF FLAT BED WITH BEDRAILS: A LITTLE
TURNING FROM BACK TO SIDE WHILE IN FLAT BAD: A LITTLE
MOVING TO AND FROM BED TO CHAIR: A LOT
STANDING UP FROM CHAIR USING ARMS: A LOT
MOBILITY SCORE: 12
DAILY ACTIVITIY SCORE: 15
TURNING FROM BACK TO SIDE WHILE IN FLAT BAD: A LITTLE
HELP NEEDED FOR BATHING: A LOT
CLIMB 3 TO 5 STEPS WITH RAILING: TOTAL
PERSONAL GROOMING: A LITTLE
CLIMB 3 TO 5 STEPS WITH RAILING: TOTAL
PERSONAL GROOMING: A LITTLE
EATING MEALS: A LITTLE
DRESSING REGULAR UPPER BODY CLOTHING: A LITTLE
STANDING UP FROM CHAIR USING ARMS: A LOT
STANDING UP FROM CHAIR USING ARMS: A LOT
MOBILITY SCORE: 12
MOBILITY SCORE: 12
DRESSING REGULAR LOWER BODY CLOTHING: A LOT
CLIMB 3 TO 5 STEPS WITH RAILING: TOTAL
DRESSING REGULAR UPPER BODY CLOTHING: A LITTLE
MOVING FROM LYING ON BACK TO SITTING ON SIDE OF FLAT BED WITH BEDRAILS: A LITTLE
TOILETING: A LOT
TOILETING: A LOT
TURNING FROM BACK TO SIDE WHILE IN FLAT BAD: A LITTLE
MOVING TO AND FROM BED TO CHAIR: A LOT
DRESSING REGULAR LOWER BODY CLOTHING: A LOT

## 2024-07-08 ASSESSMENT — PAIN - FUNCTIONAL ASSESSMENT
PAIN_FUNCTIONAL_ASSESSMENT: 0-10
PAIN_FUNCTIONAL_ASSESSMENT: 0-10

## 2024-07-08 ASSESSMENT — PAIN SCALES - GENERAL
PAINLEVEL_OUTOF10: 0 - NO PAIN
PAINLEVEL_OUTOF10: 0 - NO PAIN

## 2024-07-08 NOTE — PROGRESS NOTES
"Bing Holliday is a 63 y.o. female on day 10 of admission presenting with Adrenal insufficiency (Multi).    Subjective   NAEON. Patient seen and evaluated at bedside. She continues to have visual hallucinations. Appetite is improved. Denies any chest pain, shortness of breath, fevers, or chills.    Objective     Physical Exam  Constitutional: No acute distress, appears fatigued  HEENT: anicteric sclera, eomi, dry mucous membranes   Cardiovascular: Irregularly irregular rhythm, no murmurs, 2+ equal pulses of the extremities, normal S1 and S2  Respiratory/Thorax: Patent airways, CTAB, normal breath sounds on room air.  Gastrointestinal: +BS, Nondistended, soft, non-tender, no rebound tenderness or guarding, no masses palpable, no organomegaly  Musculoskeletal: ROM intact, no joint swelling, normal strength  Extremities: normal extremities, no cyanosis, edema, lower extremities with skin tears that are covered by bandaging  Skin: warm and dry.  Neurological: Alert and oriented x3, intact senses, motor, response and reflexes, follows commands, no facial droop    Last Recorded Vitals  Blood pressure 110/71, pulse (!) 47, temperature 35 °C (95 °F), temperature source Temporal, resp. rate 17, height 1.778 m (5' 10\"), weight 93.6 kg (206 lb 5.6 oz), SpO2 99%.  Intake/Output last 3 Shifts:  I/O last 3 completed shifts:  In: 720 (7.7 mL/kg) [P.O.:720]  Out: 501 (5.4 mL/kg) [Urine:501 (0.1 mL/kg/hr)]  Weight: 93.6 kg     Relevant Results  Scheduled medications  atorvastatin, 40 mg, oral, Nightly  folic acid, 1 mg, oral, Daily  hydrocortisone, 10 mg, oral, Daily  hydrocortisone, 20 mg, oral, Daily  insulin lispro, 0-5 Units, subcutaneous, Before meals & nightly  levETIRAcetam, 500 mg, oral, q12h  melatonin, 5 mg, oral, Nightly  metoprolol tartrate, 25 mg, oral, BID  multivitamin with minerals, 1 tablet, oral, Daily  pantoprazole, 40 mg, oral, Daily before breakfast  risperiDONE, 1 mg, oral, BID  sertraline, 25 mg, oral, " Daily  thiamine, 100 mg, oral, Daily    Continuous medications     PRN medications  PRN medications: dextrose, dextrose, glucagon, glucagon, OLANZapine, oxygen  Results from last 7 days   Lab Units 07/07/24  0743 07/06/24  0458 07/05/24  0511   WBC AUTO x10*3/uL 5.8 6.2 7.4   RBC AUTO x10*6/uL 2.94* 2.82* 2.71*   HEMOGLOBIN g/dL 8.8* 8.5* 8.1*     Results from last 7 days   Lab Units 07/07/24  0743 07/06/24  0458 07/05/24  0511 07/04/24  0443 07/03/24  0413 07/02/24  1606 07/02/24  0346   SODIUM mmol/L 144 144 145   < > 144 148* 149*   POTASSIUM mmol/L 3.6 3.8 4.0   < > 4.0 4.2 3.6   CHLORIDE mmol/L 113* 113* 114*   < > 114* 117* 118*   CO2 mmol/L 24 22 23   < > 24 25 23   BUN mg/dL 25* 28* 34*   < > 45* 42* 41*   CREATININE mg/dL 1.37* 1.42* 1.43*   < > 1.81* 1.88* 2.07*   CALCIUM mg/dL 7.9* 7.9* 8.1*   < > 7.8* 8.1* 7.8*   PHOSPHORUS mg/dL  --   --   --   --  2.1* 2.3* 2.6   MAGNESIUM mg/dL 1.78 1.98 1.74   < > 2.08  --  1.83   BILIRUBIN TOTAL mg/dL 0.4 0.4 0.5   < > 0.4  --  0.4   ALT U/L 15 14 14   < > 15  --  17   AST U/L 14 14 13   < > 13  --  14    < > = values in this interval not displayed.       No results found.     Assessment/Plan   Principal Problem:    Adrenal insufficiency (Multi)  Active Problems:    Lupus (Multi)    Pancytopenia (Multi)    Septic shock (Multi)    Patient is a 63 y.o. 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 The patient has remained stable since downgrade from the ICU. Goal of ongoing hospitalization period is to safely transition patient from IV to oral home medications, endocrinology consult regarding patient's steroid/insulin regimen.     #Septic shock secondary to pneumonia - resolved  #Acute hypoxic  respiratory failure secondary to CAP vs. Aspiration PNA - resolved  -Off pressors since 0600 on 6/30  -MRSA negative  -Blood cultures 6/28: No growth  -Urine culture 6/28: No growth  -Vancomycin (6/28-6/29), cefepime 6/28-6/28), Zosyn 6/29-7/1)  -Augmentin (7/2-07/04)  -Now on room air.  Maintain SpO2 greater than 92%  -Continue DuoNebs      #Metabolic Encephalopathy 2/2 adrenal insufficiency versus septic shock, improved  -History: Multiple admissions for AMS found to be in adrenal insufficiency, lupus cerebritis   -Home meds: Zoloft, Keppra 500 mg twice daily, meclizine, paliperidone  -CT head negative  -Continue risperidone, alternative for home paliperidone  -Continue Keppra 500mg BID  -Zyprexa 5mg q6 prn for agitation      #Elevated troponin with ST depressions likely 2/2 demand ischemia in setting of acute anemia and septic shock  #History of CAD status post CABG 2013  #History of HFmr EF (EF 40 to 45% 5/2024)  #History of atrial fibrillation on Eliquis  -Troponins 14, 18, 75, 64  -Continue atorvastatin 40 mg  -Continue home dose metoprolol 25mg  -Home Eliquis held, suspect this was in the setting of acute on chronic anemia.  Will hold on discharge, will need to discuss with cardiology regarding resumption of home Eliquis for her atrial fibrillation. May need to consider atrial appendage closure.     #JED on CKD3, likely pre-renal in setting of decreased PO intake +/- ischemia ATN.   #Acute on chronic hypernatremia, resolved  -History: CKD3 (bl Cr 1.5-1.9)  -Cr stable at baseline  -Daily RFP, Mg, Phos  -Electrolytes: Replete per protocol, goal K>4 Phos >3 Mg >2   -May consider resuming home Lasix prior to discharge if there are signs of volume overload     #Adrenal Insufficiency in setting of infection  #Hypoglycemia  #Uncontrolled type 2 diabetes  -History: recurrent adrenal insufficiency (hydrocortisone), SLE cerebritis and Jaccoud arthropathy on florinef  -Home meds: Lantus 10 units in a.m., sliding scale,  Cortef 20 mg in a.m./20 mg p.m., flu cortisone 0.1 mg every other day  -sliding scale: SSI #1.  Per endocrinology patient is okay running in the 200s.  Sliding scale instructions adjusted.  Nursing communication order was placed by endocrinology to hold insulin for BGL less than 250.  -Endocrinology recommended hydrocortisone 20 mg in the morning and 10 mg in the afternoon  -previous leg wound infection from last hospitalization at [facility]. Consult wound care.      #Acute on chronic anemia of chronic disease, chronic pancytopenia   -History: Pancytopenia   -Home meds: Eliquis 2.5 mg twice daily on hold  -Worsening thrombocytopenia-chronic versus sepsis versus Zosyn.  Will monitor.  -3 units of packed red blood cells since admission  -Daily CBC     #GERD - Protonix  #Osteoporosis - continue outpatient management, may benefit from bisphosphonate  -Continue home medications as tolerated      Global Plan of Care:   Fluids: PRN  Electrolytes: replete PRN  Diet: Regular  Consults: Wound care, Endocrinology, Nutrition, PT/OT, SLP, Palliative  DVT PPX: Held in the setting of anemia and thrombocytopenia  Code status: DNR and No Intubation  Emergency contact: López Lang (son) [phone] (Home Phone)     Dispo: Medically stable. Pending precert.     The assessment and plan were discussed with the attending physician,  Cheko Angel D.O. PGY-2

## 2024-07-08 NOTE — PROGRESS NOTES
Physical Therapy    Physical Therapy Treatment    Patient Name: Bing Holliday  MRN: [id number]  Today's Date: 7/8/2024  Time Calculation  Start Time: 0820  Stop Time: 0900  Time Calculation (min): 40 min     3023/3023-A        Outcome Measures:  [facility] Basic Mobility  Turning from your back to your side while in a flat bed without using bedrails: A little  Moving from lying on your back to sitting on the side of a flat bed without using bedrails: A little  Moving to and from bed to chair (including a wheelchair): A lot  Standing up from a chair using your arms (e.g. wheelchair or bedside chair): A lot  To walk in hospital room: Total  Climbing 3-5 steps with railing: Total  Basic Mobility - Total Score: 12      07/08/24 0820   PT  Visit   PT Received On 07/08/24   Response to Previous Treatment Patient with no complaints from previous session.   General   Reason for Referral impaired self care   Referred By Dr. Marin   Past Medical History Relevant to Rehab SLE, COPD, GERD, Afib, pacemaker, CAD   Prior to Session Communication Bedside nurse   Patient Position Received Bed, 3 rail up;Alarm on   Preferred Learning Style verbal;visual   General Comment patient soft spoken, cooperative, mildly confused and hallicinating   Precautions   Medical Precautions Fall precautions   Pain Assessment   Pain Assessment 0-10   0-10 (Numeric) Pain Score   (reports chronic pain, did not rate)   Pain Type Chronic pain   Pain Location Ankle   Pain Orientation Left   Pain Interventions Repositioned   Cognition   Overall Cognitive Status Impaired   Orientation Level Disoriented to situation;Disoriented to time   Coordination   Movements are Fluid and Coordinated No   Postural Control   Postural Control Impaired   Posture Comment slumped posture sitting EOB   Static Sitting Balance   Static Sitting-Balance Support No upper extremity supported   Static Sitting-Level of Assistance Close supervision   Static Sitting-Comment/Number of  Minutes 5-8 min EOB   Static Standing Balance   Static Standing-Balance Support Bilateral upper extremity supported   Static Standing-Level of Assistance Minimum assistance   Static Standing-Comment/Number of Minutes in lamar stedy, 2-3 min at a time x3 times   Therapeutic Exercise   Therapeutic Exercise Performed Yes   Therapeutic Exercise Activity 1 ankle pumps x10   Therapeutic Exercise Activity 2 resisted leg press x10   Therapeutic Exercise Activity 3 heel slides x10   Therapeutic Exercise Activity 4 hip ABD x10   Therapeutic Exercise Activity 5 pillow squeeze x10   Bed Mobility   Bed Mobility Yes   Bed Mobility 1   Bed Mobility 1 Supine to sitting   Level of Assistance 1 Minimum assistance   Bed Mobility Comments 1 extra assist needed to scoot to the edge of bed   Transfers   Transfer Yes   Transfer 1   Transfer From 1 Bed to   Transfer to 1 Stand   Technique 1 Sit to stand;Stand to sit   Transfer Device 1 Alejandra Stedy   Transfer Level of Assistance 1 Minimum assistance   Trials/Comments 1 x3 trials, incontinent of stool requiring candido-care and dressing change via nurse   Activity Tolerance   Endurance Tolerates 30 min exercise with multiple rests   Early Mobility/Exercise Safety Screen Proceed with mobilization - No exclusion criteria met   PT Assessment   PT Assessment Results Decreased strength;Decreased endurance;Decreased mobility;Decreased range of motion;Decreased cognition;Impaired judgement;Decreased safety awareness   Rehab Prognosis Fair   End of Session Communication Bedside nurse   End of Session Patient Position Up in chair;Alarm on   Outpatient Education   Individual(s) Educated Patient   Education Provided Fall Risk;Body Mechanics;POC;Posture   Patient/Caregiver Demonstrated Understanding yes   Plan of Care Discussed and Agreed Upon yes   Patient Response to Education Patient/Caregiver Verbalized Understanding of Information   PT Plan   Inpatient/Swing Bed or Outpatient Inpatient   PT Plan    Treatment/Interventions Bed mobility;Transfer training;Therapeutic exercise   PT Plan Ongoing PT   PT Frequency 3 times per week   PT Discharge Recommendations Moderate intensity level of continued care   Equipment Recommended upon Discharge Wheelchair   PT Recommended Transfer Status Assist x2  (sera steradha)        EDUCATION:  Outpatient Education  Individual(s) Educated: Patient  Education Provided: Fall Risk, Body Mechanics, POC, Posture  Patient/Caregiver Demonstrated Understanding: yes  Plan of Care Discussed and Agreed Upon: yes  Patient Response to Education: Patient/Caregiver Verbalized Understanding of Information  GOALS:  Encounter Problems       Encounter Problems (Active)       Balance       Pt will demonstrate static/dynamic sitting balance for >/= 5 min CGA with UE/LE support without evidence of instability or LOB.        Start:  07/02/24    Expected End:  07/16/24            Pt will demonstrate static/dynamic standing balance for >/= 3 min mod A x2 without evidence of instability or LOB with functional mobility tasks.         Start:  07/02/24    Expected End:  07/16/24                   Mobility       Pt will complete supine, seated and standing exercises to maintain/improve overall strength with minimal verbal cues.         Start:  07/02/24    Expected End:  07/16/24               PT Transfers       Pt will be able to complete all bed mobility tasks with use of bed HR mod A.       Start:  07/02/24    Expected End:  07/16/24            Pt will be able to complete all transfers with least restrictive device mod A x2 demonstrating good safety awareness and proper body mechanics.        Start:  07/02/24    Expected End:  07/16/24               Pain - Adult

## 2024-07-08 NOTE — NURSING NOTE
1320- Went in to see patient and patient arouses to voice but falls right back to sleep. Patient falls asleep while answering answers. Able to squeeze my hands. Dr Angel notified. Patient put back to bed using sera study and 3 people. Dr Angel will come up and assess patient.    1600- Dr Angel at bedside. No real changes since earlier.     1635- Lab in to draw labs and patient didn't wake up at all. I went in and patient opened her eyes when I said her name but then went right back to sleep. Dr Seth notified.    1700- Dr Seth in to see patient. EKG ordered and done.     EOS- Changes as documented above. Patient doesn't have any signs/symptoms of pain or shortness of breath. Patient still responds to voice and falls back to sleep. Patient not talking when questions are asked. Vitals have been stable as well as blood sugars. Safety maintained and call light within reach.    1825- CT scan still hasn't been done. Called down to CT to see when they could get the patient and they said they would send for her now.    1840- Patient going done for CT scan.

## 2024-07-08 NOTE — PROGRESS NOTES
"    Endocrinology Inpatient Consult Progress Note     PATIENT NAME: Bing Holliday  MRN: [id number]  DATE: 7/8/2024    CONSULTING PHYSICIAN: Dr. Zavaleta  REASON FOR CONSULT: AI. Abnormal TFTs.      Interval Events     Reportedly having auditory and visual hallucinations yesterday.  The patient declined any to me.  She states she is not particularly hungry.  Denies any nausea or vomiting.  She was waiting for breakfast this morning.      Physical Examination     /71   Pulse (!) 47 Comment: rn notified  Temp 35 °C (95 °F) (Temporal)   Resp 17   Ht 1.778 m (5' 10\")   Wt 93.6 kg (206 lb 5.6 oz)   LMP  (LMP Unknown)   SpO2 99%   BMI 29.61 kg/m²   No acute distress.  Appropriate affect.  Frontal alopecia.  Regular rate and rhythm.  Nonlabored respiration.  Soft nontender nondistended abdomen.  No pedal edema bilaterally.      Medications     Reviewed MAR       Data     Recent Labs and Imaging Reviewed      Assessment / Plan        # Adrenal Insufficiency  Continue physiologic hydrocortisone as ordered.  Given that these doses are fairly physiologic, I do not think that her visual hallucinations are related to her glucocorticoid program.     # Hypoglycemia  # Uncontrolled Type 2 Diabetes Mellitus  Her sugars have been reviewed.  She reportedly ate all of her breakfast this morning..  No hypoglycemia.  No hyperglycemia.  Not requiring sliding scale.  Continue current regimen.      # Abnormal TFTs  As per my initial consult note.  No intervention necessary at this time.     # Osteoporosis  In the setting of chronic glucocorticoid use. This will be further managed as an outpatient. She probably would benefit from a bisphosphonate, these agents are best studied with steroid-induced adynamic bone disease.         Avi Sloan, DO  Endocrinology, Diabetes, and Metabolism    Available via EP[employer] Messenger    Please excuse any typographical or unwanted errors within this documentation as voice recognition software " was used to dictate this note.

## 2024-07-08 NOTE — NURSING NOTE
EOS:  Slept well through the night.  External catheter remains intact.  Oriented x2-3, no hallucinations at this time.  VSS.

## 2024-07-08 NOTE — SIGNIFICANT EVENT
Significant event    I was notified by the nurse that the patient continues to be lethargic but arousable. Upon evaluation, the patient appeared lethargic, which is a different from their morning condition. Repeat vital signs showed temperature 36°C, heart rate 77 bpm, respiratory rate 18 breaths per minute, and blood pressure 116/93 mmHg. Accu-Chek reading was 124. On examination, the patient was alert and responded mildly to commands.     Considering the change from the morning, I ordered CBC, CMP, EKG, and a stat CT head. We will reassess the patient's condition after receiving these results. Liver function tests showed no significant electrolyte abnormalities, with creatinine at 1.44 and calcium at 8.82. Pending results include CBC with differential and CT scan.      Cdoy Seth MD   Internal Medicine, PGY-2 .

## 2024-07-09 VITALS
WEIGHT: 210.98 LBS | HEART RATE: 81 BPM | BODY MASS INDEX: 30.2 KG/M2 | DIASTOLIC BLOOD PRESSURE: 76 MMHG | OXYGEN SATURATION: 99 % | RESPIRATION RATE: 18 BRPM | HEIGHT: 70 IN | SYSTOLIC BLOOD PRESSURE: 105 MMHG | TEMPERATURE: 95.2 F

## 2024-07-09 PROBLEM — R65.21 SEPTIC SHOCK (MULTI): Status: RESOLVED | Noted: 2023-11-30 | Resolved: 2024-07-09

## 2024-07-09 PROBLEM — A41.9 SEPTIC SHOCK (MULTI): Status: RESOLVED | Noted: 2023-11-30 | Resolved: 2024-07-09

## 2024-07-09 LAB
ACANTHOCYTES BLD QL SMEAR: NORMAL
ALBUMIN SERPL BCP-MCNC: 2.6 G/DL (ref 3.4–5)
ANION GAP SERPL CALC-SCNC: 14 MMOL/L (ref 10–20)
BASOPHILS # BLD MANUAL: 0 X10*3/UL (ref 0–0.1)
BASOPHILS NFR BLD MANUAL: 0 %
BUN SERPL-MCNC: 23 MG/DL (ref 6–23)
CALCIUM SERPL-MCNC: 8.2 MG/DL (ref 8.6–10.3)
CHLORIDE SERPL-SCNC: 115 MMOL/L (ref 98–107)
CO2 SERPL-SCNC: 22 MMOL/L (ref 21–32)
CREAT SERPL-MCNC: 1.39 MG/DL (ref 0.5–1.05)
DACRYOCYTES BLD QL SMEAR: NORMAL
EGFRCR SERPLBLD CKD-EPI 2021: 43 ML/MIN/1.73M*2
EOSINOPHIL # BLD MANUAL: 0 X10*3/UL (ref 0–0.7)
EOSINOPHIL NFR BLD MANUAL: 0 %
ERYTHROCYTE [DISTWIDTH] IN BLOOD BY AUTOMATED COUNT: 20.9 % (ref 11.5–14.5)
GLUCOSE BLD MANUAL STRIP-MCNC: 176 MG/DL (ref 74–99)
GLUCOSE BLD MANUAL STRIP-MCNC: 180 MG/DL (ref 74–99)
GLUCOSE BLD MANUAL STRIP-MCNC: 81 MG/DL (ref 74–99)
GLUCOSE SERPL-MCNC: 83 MG/DL (ref 74–99)
HCT VFR BLD AUTO: 31.1 % (ref 36–46)
HGB BLD-MCNC: 9 G/DL (ref 12–16)
IMM GRANULOCYTES # BLD AUTO: 0.06 X10*3/UL (ref 0–0.7)
IMM GRANULOCYTES NFR BLD AUTO: 1.3 % (ref 0–0.9)
LYMPHOCYTES # BLD MANUAL: 1.53 X10*3/UL (ref 1.2–4.8)
LYMPHOCYTES NFR BLD MANUAL: 34 %
MCH RBC QN AUTO: 30.1 PG (ref 26–34)
MCHC RBC AUTO-ENTMCNC: 28.9 G/DL (ref 32–36)
MCV RBC AUTO: 104 FL (ref 80–100)
METAMYELOCYTES # BLD MANUAL: 0.05 X10*3/UL
METAMYELOCYTES NFR BLD MANUAL: 1 %
MONOCYTES # BLD MANUAL: 0.23 X10*3/UL (ref 0.1–1)
MONOCYTES NFR BLD MANUAL: 5 %
NEUTROPHILS # BLD MANUAL: 2.71 X10*3/UL (ref 1.2–7.7)
NEUTS BAND # BLD MANUAL: 0.05 X10*3/UL (ref 0–0.7)
NEUTS BAND NFR BLD MANUAL: 1 %
NEUTS SEG # BLD MANUAL: 2.66 X10*3/UL (ref 1.2–7)
NEUTS SEG NFR BLD MANUAL: 59 %
NRBC BLD-RTO: 5.1 /100 WBCS (ref 0–0)
OVALOCYTES BLD QL SMEAR: NORMAL
PHOSPHATE SERPL-MCNC: 3.5 MG/DL (ref 2.5–4.9)
PLATELET # BLD AUTO: 85 X10*3/UL (ref 150–450)
POLYCHROMASIA BLD QL SMEAR: NORMAL
POTASSIUM SERPL-SCNC: 4.7 MMOL/L (ref 3.5–5.3)
RBC # BLD AUTO: 2.99 X10*6/UL (ref 4–5.2)
RBC MORPH BLD: NORMAL
SODIUM SERPL-SCNC: 146 MMOL/L (ref 136–145)
TOTAL CELLS COUNTED BLD: 100
WBC # BLD AUTO: 4.5 X10*3/UL (ref 4.4–11.3)

## 2024-07-09 PROCEDURE — 82947 ASSAY GLUCOSE BLOOD QUANT: CPT

## 2024-07-09 PROCEDURE — 97535 SELF CARE MNGMENT TRAINING: CPT | Mod: GO,CO

## 2024-07-09 PROCEDURE — 36415 COLL VENOUS BLD VENIPUNCTURE: CPT

## 2024-07-09 PROCEDURE — 2500000004 HC RX 250 GENERAL PHARMACY W/ HCPCS (ALT 636 FOR OP/ED)

## 2024-07-09 PROCEDURE — 2500000001 HC RX 250 WO HCPCS SELF ADMINISTERED DRUGS (ALT 637 FOR MEDICARE OP)

## 2024-07-09 PROCEDURE — 85027 COMPLETE CBC AUTOMATED: CPT

## 2024-07-09 PROCEDURE — 2500000001 HC RX 250 WO HCPCS SELF ADMINISTERED DRUGS (ALT 637 FOR MEDICARE OP): Performed by: NURSE PRACTITIONER

## 2024-07-09 PROCEDURE — 2500000001 HC RX 250 WO HCPCS SELF ADMINISTERED DRUGS (ALT 637 FOR MEDICARE OP): Performed by: INTERNAL MEDICINE

## 2024-07-09 PROCEDURE — 2500000002 HC RX 250 W HCPCS SELF ADMINISTERED DRUGS (ALT 637 FOR MEDICARE OP, ALT 636 FOR OP/ED)

## 2024-07-09 PROCEDURE — 97110 THERAPEUTIC EXERCISES: CPT | Mod: GP,CQ

## 2024-07-09 PROCEDURE — 92526 ORAL FUNCTION THERAPY: CPT | Mod: GN | Performed by: SPEECH-LANGUAGE PATHOLOGIST

## 2024-07-09 PROCEDURE — 97116 GAIT TRAINING THERAPY: CPT | Mod: GP,CQ

## 2024-07-09 PROCEDURE — 2500000002 HC RX 250 W HCPCS SELF ADMINISTERED DRUGS (ALT 637 FOR MEDICARE OP, ALT 636 FOR OP/ED): Performed by: INTERNAL MEDICINE

## 2024-07-09 PROCEDURE — 99238 HOSP IP/OBS DSCHRG MGMT 30/<: CPT

## 2024-07-09 PROCEDURE — 80069 RENAL FUNCTION PANEL: CPT

## 2024-07-09 PROCEDURE — 85007 BL SMEAR W/DIFF WBC COUNT: CPT

## 2024-07-09 RX ORDER — HYDROCORTISONE 20 MG/1
20 TABLET ORAL EVERY MORNING
Start: 2024-07-09 | End: 2024-08-08

## 2024-07-09 RX ORDER — HYDROCORTISONE 10 MG/1
10 TABLET ORAL DAILY
Start: 2024-07-09 | End: 2024-08-08

## 2024-07-09 RX ORDER — DEXTROSE MONOHYDRATE AND SODIUM CHLORIDE 5; .45 G/100ML; G/100ML
100 INJECTION, SOLUTION INTRAVENOUS CONTINUOUS
Status: ACTIVE | OUTPATIENT
Start: 2024-07-09 | End: 2024-07-09

## 2024-07-09 ASSESSMENT — COGNITIVE AND FUNCTIONAL STATUS - GENERAL
STANDING UP FROM CHAIR USING ARMS: A LOT
TURNING FROM BACK TO SIDE WHILE IN FLAT BAD: A LOT
CLIMB 3 TO 5 STEPS WITH RAILING: TOTAL
MOVING TO AND FROM BED TO CHAIR: A LOT
DAILY ACTIVITIY SCORE: 13
MOBILITY SCORE: 12
HELP NEEDED FOR BATHING: A LOT
WALKING IN HOSPITAL ROOM: A LOT
WALKING IN HOSPITAL ROOM: TOTAL
PERSONAL GROOMING: A LITTLE
MOVING FROM LYING ON BACK TO SITTING ON SIDE OF FLAT BED WITH BEDRAILS: A LITTLE
MOBILITY SCORE: 12
EATING MEALS: A LITTLE
MOVING FROM LYING ON BACK TO SITTING ON SIDE OF FLAT BED WITH BEDRAILS: A LOT
TURNING FROM BACK TO SIDE WHILE IN FLAT BAD: A LOT
EATING MEALS: A LITTLE
TOILETING: A LOT
MOVING TO AND FROM BED TO CHAIR: A LOT
HELP NEEDED FOR BATHING: A LOT
STANDING UP FROM CHAIR USING ARMS: A LITTLE
TOILETING: A LOT
MOBILITY SCORE: 11
MOVING FROM LYING ON BACK TO SITTING ON SIDE OF FLAT BED WITH BEDRAILS: A LITTLE
TURNING FROM BACK TO SIDE WHILE IN FLAT BAD: A LITTLE
DRESSING REGULAR LOWER BODY CLOTHING: TOTAL
DRESSING REGULAR UPPER BODY CLOTHING: A LOT
STANDING UP FROM CHAIR USING ARMS: A LOT
DRESSING REGULAR UPPER BODY CLOTHING: A LOT
WALKING IN HOSPITAL ROOM: TOTAL
PERSONAL GROOMING: A LOT
DAILY ACTIVITIY SCORE: 12
DRESSING REGULAR LOWER BODY CLOTHING: TOTAL
MOVING TO AND FROM BED TO CHAIR: A LOT

## 2024-07-09 ASSESSMENT — PAIN - FUNCTIONAL ASSESSMENT
PAIN_FUNCTIONAL_ASSESSMENT: 0-10

## 2024-07-09 ASSESSMENT — PAIN SCALES - GENERAL
PAINLEVEL_OUTOF10: 0 - NO PAIN

## 2024-07-09 ASSESSMENT — ACTIVITIES OF DAILY LIVING (ADL)
BATHING_LEVEL_OF_ASSISTANCE: MAXIMUM ASSISTANCE
HOME_MANAGEMENT_TIME_ENTRY: 25

## 2024-07-09 NOTE — PROGRESS NOTES
"Speech-Language Pathology    SLP Adult Inpatient  Speech-Language Pathology Treatment     Patient Name: Bing Holliday  MRN: [id number]  Today's Date: 7/9/2024  Time Calculation  Start Time: 1516  Stop Time: 1526  Time Calculation (min): 10 min      Current Problem:   1. Pancytopenia (Multi)  Central Line    Central Line      2. Septic shock (Multi)  Central Line    Central Line      3. Hypothermia, initial encounter        4. Acute kidney injury superimposed on CKD (CMS-HCC)        5. Altered mental status, unspecified altered mental status type        6. Adrenal insufficiency (Multi)  hydrocortisone (Cortef) 10 mg tablet    hydrocortisone (Cortef) 20 mg tablet      7. PAF (paroxysmal atrial fibrillation) (Multi)  Referral to Cardiology        Recommendations:   Diet: Regular solids (level 7)  Liquids: Thin (level 0)  Medications: Per RN discretion  Compensatory Strategies/Aspiration Precautions: 1:1 assist/supervision, alert, upright at 90 degrees during PO intake + 20-30 minutes following meals, small/single sips + bites, slow rate, alternate consistencies, allow extra time to chew + swallow  Please notify the SLP team with any concerns/issues/changes regarding patient's PO intake/aspiration    Therapeutic Swallow Intervention: Pt/Caregiver Education      SLP Assessment:  Discussed recommended compensatory swallow techniques per pt’s 7/2/24 clinical swallow evaluation. Without cues, pt stated, \"I drink slowly. I take sips rather than gulps.” Given forced choice cues re: the above strategies, pt could indicate all but one of them, for which a verbal model was provided.   Educated pt re: oral care, with review of supplies to be used (i.e., toothbrush/toothpaste; oral swab dipped in mouthwash, followed by squeezing the excess fluid from the oral swab prior to applying oral stim to lips, tongue, bilateral sulci, etc).   Pt declined oral care at this time (despite education re: rationale behind same), however, she was " able to demonstrate adequate teach-back.    Pt also declined PO at this time (despite education re: rationale behind same). She was advised to continue use of above safe swallow strategies during all eating/drinking.   ST to continue to follow x1 additional session for ongoing assessment of diet tolerance, and generalization of safe swallow strategies.                                                             Plan:  Skilled speech therapy for dysphagia treatment continues to be warranted to provide training and instruction regarding the use of compensatory swallow strategies, provide pt education in order to reduce risk of aspiration, dehydration and malnutrition, and to assess tolerance of current diet.   SLP Frequency: 1x per week   Duration: Current admission    SLP Discharge Recommendations: SNF    Goals (established per pt’s clinical swallow evaluation on 7/2/24):  1. Patient will tolerate baseline diet with no overt s/s of aspiration in 90% of observed therapeutic trials.  Status: Not applicable.   Pt declined PO this date despite education re: rationale behind assessment of same.   2. Patient will implement safe swallowing strategies to reduce risk of aspiration in 90% of trials given caregiver assistance/cueing as needed.  Status: Progressing. Pt required overall mod cues to indicate strategies.    3. Pending appropriateness for same, patient will complete a modified barium swallow study (MBSS) for objective assessment of swallowing function, to assess for aspiration, and to guide further recommendations and treatment plan.  Status: Not addressed this date.    Subjective:  Patient was seen at bedside for skilled dysphagia treatment. She was received sitting up in bed at ~a 45 degree angle. Vocal intensity was slightly decreased.      Baseline Assessment:  O2 use: Room air     General Visit Information:  Clinical Swallow Evaluation was completed 7/2/24. NPO with alternate means nutrition/hydration was  recommended.    Current diet level is Regular/Thin 0, with recommendations as follows: 1:1 feeding; moistening agents on each tray please.    Pt, RN report no current difficulty swallowing.     Pain:  Rating 0-10: 0     Education:  Learner  patient; RN  Barriers to Learning  Acuity of illness; cognitive limitations barrier  Method demonstration; verbal   Education - Topic  SLP provided patient/caregiver education regarding results and recommendations of tx, goals of treatment, and plan of care. Patient verbalized questionable comprehension, consistent with cognitive status. Education will be reinforced. SLP further coordinated with RN regarding recommendations and precautions per this assessment, with RN verbalizing understanding.  Outcome Needs review and reinforcement

## 2024-07-09 NOTE — DISCHARGE SUMMARY
Discharge Diagnosis  Adrenal insufficiency (Multi)    Issues Requiring Follow-Up  Adrenal insufficiency  Paroxysmal atrial fibrillation  Chronic steroid use  Osteoporosis  Bilateral lower extremity wounds    Patient will need follow up with primary care for hospital follow up. Additionally will need follow up with Cardiology to discuss anticoagulation in the setting of atrial fibrillation previously on Eliquis but now thrombocytopenic (may be candidate for atrial appendage occlusion), and with Endocrinology for adrenal insufficiency and possible bisphosphonate therapy.    Hydrocortisone changed to 20 mg in the AM and 10 mg in the afternoon. Eliquis stopped until further cardiology evaluation. Wound care recommendations for Xeroform and Mepilex to open wound areas, lotion to scar tissue, and covering with Telfa and Kerlix to protect from heel boots.    Discharge Meds     Your medication list        CHANGE how you take these medications        Instructions Last Dose Given Next Dose Due   atorvastatin 40 mg tablet  Commonly known as: Lipitor  What changed: when to take this      Take 1 tablet (40 mg) by mouth once daily.       hydrocortisone 10 mg tablet  Commonly known as: Cortef  What changed:   medication strength  how much to take  additional instructions      Take 1 tablet (10 mg) by mouth once daily. In the afternoon       hydrocortisone 20 mg tablet  Commonly known as: Cortef  What changed: when to take this      Take 1 tablet (20 mg) by mouth once daily in the morning.              CONTINUE taking these medications        Instructions Last Dose Given Next Dose Due   acetaminophen 325 mg tablet  Commonly known as: Tylenol           albuterol 90 mcg/actuation inhaler           albuterol-budesonide 90-80 mcg/actuation inhaler  Commonly known as: Airsupra           Dexcom G6  misc  Generic drug: blood-glucose meter,continuous      Use as instructed       Dexcom G6 Sensor device  Generic drug: blood-glucose  "sensor      Use to check sugars 3 times daily       Dexcom G6 Transmitter device  Generic drug: blood-glucose transmitter device      Use as instructed       fludrocortisone 0.1 mg tablet  Commonly known as: Florinef      Take 1 tablet (0.1 mg) by mouth every other day.       folic acid 1 mg tablet  Commonly known as: Folvite      TAKE 1 TABLET BY MOUTH EVERY DAY       furosemide 40 mg tablet  Commonly known as: Lasix           Glucagon (HCl) Emergency Kit 1 mg recon soln  Generic drug: glucagon HCL           insulin lispro 100 unit/mL injection  Commonly known as: HumaLOG           Lantus U-100 Insulin 100 unit/mL injection  Generic drug: insulin glargine           levETIRAcetam 500 mg tablet  Commonly known as: Keppra      Take 1 tablet (500 mg) by mouth every 12 hours.       meclizine 25 mg tablet  Commonly known as: Antivert           melatonin 5 mg tablet           metoprolol tartrate 25 mg tablet  Commonly known as: Lopressor      Take 1 tablet (25 mg) by mouth 2 times a day.       Mucinex 600 mg 12 hr tablet  Generic drug: guaiFENesin           multivitamin with minerals tablet           mycophenolate 500 mg tablet  Commonly known as: Cellcept      TAKE 2 TABLETS BY MOUTH TWICE A DAY       paliperidone 6 mg 24 hr tablet  Commonly known as: Invega      Take 1 tablet (6 mg) by mouth once daily. Do not crush, chew, or split.       paliperidone 9 mg 24 hr tablet  Commonly known as: Invega           pantoprazole 40 mg EC tablet  Commonly known as: ProtoNix      TAKE 1 TABLET BY MOUTH EVERY DAY       pen needle, diabetic 31 gauge x 5/16\" needle      Use to inject 1-4 times daily as directed.       potassium chloride CR 20 mEq ER tablet  Commonly known as: Klor-Con M20           sertraline 25 mg tablet  Commonly known as: Zoloft           thiamine 100 mg tablet  Commonly known as: Vitamin B-1           Trelegy Ellipta 200-62.5-25 mcg blister with device  Generic drug: fluticasone-umeclidin-vilanter      Inhale 1 " puff once daily.       Venelex ointment  Generic drug: balsam peru-castor oiL                  STOP taking these medications      apixaban 2.5 mg tablet  Commonly known as: Eliquis                  Where to Get Your Medications        Information about where to get these medications is not yet available    Ask your nurse or doctor about these medications  hydrocortisone 10 mg tablet  hydrocortisone 20 mg tablet         Test Results Pending At Discharge  Pending Labs       No current pending labs.            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 Outpatient labs showed a creatinine of 2.22 with a baseline of 1.5. On initial evaluation, patient was hypothermic with T 90.1 and soft BP at 91/60, but otherwise stable on RA. Labs showed anemia and thrombocytopenia along with elevation of creatinine. CT imaging of the head and AP were negative for acute findings. The patient was started on Vanc, Zosyn, Metronidazole, given sepsis bolus of fluid, and was started on pressors due to concern for sepsis.  Central line placed, levophed administered and the patient was admitted to the ICU for management of metabolic encephalopathy 2/2 multifactorial sepsis with adrenal insufficiency.    In the ICU, the patient was AOx1 and agitated despite Zyprexa and required precedex drip. Hemoglobin continue to decrease and the patient required pRBC transfusion. Antibiotics were de-escalated to Augmentin, pressors and sedation were weaned off, and the patient remained hemodynamically stable. The patient initially required NGT for nutrition/meds,  but this was removed after the patient passed a barium swallow. Nephrology, Endocrine, and Palliative were all consulted in the ICU to assist with management. Patient remained stable and was downgraded to the general medicine service.    After downgrade, the patient improved back to baseline mental status of Aox3 intermittently with some visual hallucinations. There was some concern given episode of lethargy, however head CT was negative and mental status improved. There were also concerns given hypothermia, which the patient has had this in the past. Despite hypothermia, the patient is alert and oriented without mental status changes. After review of her medications, this may be due to Zyprexa as second generation antipsychotics do have side effects that include hypothermia. Endocrinology recommended continuing hydrocortisone at physiologic dosing of 20 mg in the morning and 10 mg in the afternoon, as well as following up for possible outpatient bisphosphonate therapy due to her chronic steroid use and osteoporosis. Wound care recommended applying Xeroform and Mepilex to open wound areas, applying lotion to scar tissue, and then covering with Telfa and Kerlix to protect from heel boots due to lower extremity wounds that are known from her prior admission at [facility]. Palliative was consulted and it was decided to continue with outpatient palliative services through [facility]. PT recommended moderate intensity care. The patient completed a course of antibiotics for septic shock secondary to pneumonia, and had resolution of hypoxic respiratory failure and metabolic encephalopathy. Eliquis for atrial fibrillation was held in the setting of her acute on chronic thrombocytopenia. The patient remained HDS on RA without clear signs of decompensation and was deemed stable and medically appropriate for discharge from [facility] to SNF with the above mentioned steroid changes. Eliquis was held until further Cardiology evaluation as  there is risk of bleed due to thrombocytopenia, and the patient may be a candidate for atrial appendage occlusion. Instructions to follow up with primary care, Cardiology, and Endocrinology were given.    Pertinent Physical Exam At Time of Discharge  Physical Exam  Constitutional:       Appearance: She is obese.      Comments: Tired   HENT:      Head: Normocephalic and atraumatic.      Mouth/Throat:      Mouth: Mucous membranes are moist.      Pharynx: No posterior oropharyngeal erythema.   Eyes:      General: No scleral icterus.     Extraocular Movements: Extraocular movements intact.   Cardiovascular:      Rate and Rhythm: Normal rate and regular rhythm.      Heart sounds: No murmur heard.     No friction rub. No gallop.   Pulmonary:      Effort: Pulmonary effort is normal. No respiratory distress.      Breath sounds: No wheezing or rales.   Abdominal:      General: Abdomen is flat. There is no distension.      Palpations: Abdomen is soft.      Tenderness: There is no abdominal tenderness. There is no guarding.   Musculoskeletal:         General: Swelling (Bilateral lower extremities) present. No tenderness.   Skin:     General: Skin is warm and dry.      Comments: Lower extremities wounds wrapped   Neurological:      Mental Status: She is alert and oriented to person, place, and time.      Sensory: No sensory deficit.       Outpatient Follow-Up  Future Appointments   Date Time Provider Department Center   8/13/2024 10:30 AM Michael Arenas MD [id number] [place]   8/21/2024  1:20 PM [facility] [address]   8/21/2024  2:15 PM Antonio Rodriges MD [id number] [place]     The discharge summary was discussed with the attending physician,  Cheko Angel DO PGY-2

## 2024-07-09 NOTE — CARE PLAN
The patient's goals for the shift include  remaining comfortable throughout shift.    The clinical goals for the shift include See POC      Problem: Nutrition  Goal: Oral intake greater than 50%  Outcome: Progressing  Goal: Oral intake greater 75%  Outcome: Progressing  Goal: Adequate PO fluid intake  Outcome: Progressing  Goal: Nutrition support goals are met within 48 hrs  Outcome: Progressing  Goal: Nutrition support is meeting 75% of nutrient needs  Outcome: Progressing  Goal: Tube feed tolerance  Outcome: Progressing  Goal: BG  mg/dL  Outcome: Progressing  Goal: Lab values WNL  Outcome: Progressing  Goal: Electrolytes WNL  Outcome: Progressing  Goal: Promote healing  Outcome: Progressing  Goal: Maintain stable weight  Outcome: Progressing  Goal: Reduce weight from edema/fluid  Outcome: Progressing  Goal: Gradual weight gain  Outcome: Progressing  Goal: Improve ostomy output  Outcome: Progressing     Problem: Diabetes  Goal: Achieve decreasing blood glucose levels by end of shift  Outcome: Progressing  Goal: Increase stability of blood glucose readings by end of shift  Outcome: Progressing  Goal: Decrease in ketones present in urine by end of shift  Outcome: Progressing  Goal: Maintain electrolyte levels within acceptable range throughout shift  Outcome: Progressing  Goal: No changes in neurological exam by end of shift  Outcome: Progressing  Goal: Learn about and adhere to nutrition recommendations by end of shift  Outcome: Progressing  Goal: Vital signs within normal range for age by end of shift  Outcome: Progressing  Goal: Receive DSME education by end of shift  Outcome: Progressing     Problem: Skin  Goal: Decreased wound size/increased tissue granulation at next dressing change  Outcome: Progressing  Goal: Participates in plan/prevention/treatment measures  Outcome: Progressing  Goal: Prevent/manage excess moisture  Outcome: Progressing  Flowsheets (Taken 7/8/2024 3251)  Prevent/manage excess  moisture: Cleanse incontinence/protect with barrier cream  Goal: Prevent/minimize sheer/friction injuries  Outcome: Progressing  Goal: Promote/optimize nutrition  Outcome: Progressing  Goal: Promote skin healing  Outcome: Progressing     Problem: Discharge Planning  Goal: Discharge to home or other facility with appropriate resources  Outcome: Progressing     Problem: Chronic Conditions and Co-morbidities  Goal: Patient's chronic conditions and co-morbidity symptoms are monitored and maintained or improved  Outcome: Progressing     Problem: Safety - Adult  Goal: Free from fall injury  Outcome: Progressing

## 2024-07-09 NOTE — PROGRESS NOTES
"    Endocrinology Inpatient Consult Progress Note     PATIENT NAME: Bing Holliday  MRN: [id number]  DATE: 7/9/2024    CONSULTING PHYSICIAN: Dr. Zavaleta  REASON FOR CONSULT: AI. Abnormal TFTs.      Interval Events     Seen this morning.  Patient was waiting for breakfast.  She states that she would eat some.  She states he has good appetite.  She denies any nausea or vomiting.      Physical Examination     /59 (BP Location: Right arm, Patient Position: Lying)   Pulse 68   Temp 34 °C (93.2 °F) (Rectal)   Resp 16   Ht 1.778 m (5' 10\")   Wt 95.7 kg (210 lb 15.7 oz)   LMP  (LMP Unknown)   SpO2 96%   BMI 30.27 kg/m²   No acute distress.  Alert and oriented x 3.  Knows the president.  Appropriate affect.  Frontal alopecia.  Regular rate and rhythm.  Nonlabored respiration.  Soft nontender nondistended abdomen.  No pedal edema bilaterally.      Medications     Reviewed MAR       Data     Recent Labs and Imaging Reviewed      Assessment / Plan        # Adrenal Insufficiency  Continue physiologic hydrocortisone as ordered.  Given that these doses are fairly physiologic, I do not think that her visual hallucinations are related to her glucocorticoid program.     # Hypoglycemia  # Uncontrolled Type 2 Diabetes Mellitus  Her sugars have been reviewed.  She reportedly ate all of her breakfast this morning..  No hypoglycemia.  No hyperglycemia.  Not requiring sliding scale.  Continue current regimen.      # Abnormal TFTs  As per my initial consult note.  No intervention necessary at this time.     # Osteoporosis  In the setting of chronic glucocorticoid use. This will be further managed as an outpatient. She probably would benefit from a bisphosphonate, these agents are best studied with steroid-induced adynamic bone disease.         Avi Sloan, DO  Endocrinology, Diabetes, and Metabolism    Available via EPIC Messenger    Please excuse any typographical or unwanted errors within this documentation as voice " recognition software was used to dictate this note.

## 2024-07-09 NOTE — PROGRESS NOTES
Occupational Therapy    OT Treatment    Patient Name: Bing Holliday  MRN: [id number]  Today's Date: 7/9/2024  Time Calculation  Start Time: 1455  Stop Time: 1520  Time Calculation (min): 25 min       3023/3023-A    Assessment:  End of Session Communication: Bedside nurse  End of Session Patient Position: Up in chair, Alarm on       Plan:  OT Frequency: 3 times per week  OT Discharge Recommendations: Moderate intensity level of continued care     Subjective        07/09/24 1455   OT Last Visit   OT Received On 07/09/24   General   Prior to Session Communication Bedside nurse   Patient Position Received Alarm on;Up in chair   Preferred Learning Style verbal;visual   General Comment Pt pleasant and cooperative, cleared for tx.   Pain Assessment   Pain Assessment 0-10   0-10 (Numeric) Pain Score 0 - No pain   UE Bathing   UE Bathing Level of Assistance Moderate assistance   UE Bathing Where Assessed   (up in chair)   UE Bathing Comments cues for initiation to tasks, educated on AE/AD   LE Bathing   LE Bathing Level of Assistance Maximum assistance   LE Bathing Where Assessed   (up in chair)   IP OT Assessment   End of Session Communication Bedside nurse   End of Session Patient Position Up in chair;Alarm on   Inpatient Plan   OT Frequency 3 times per week   OT Discharge Recommendations Moderate intensity level of continued care       Outcome Measures:[facility] Daily Activity  Putting on and taking off regular lower body clothing: Total  Bathing (including washing, rinsing, drying): A lot  Putting on and taking off regular upper body clothing: A lot  Toileting, which includes using toilet, bedpan or urinal: A lot  Taking care of personal grooming such as brushing teeth: A little  Eating Meals: A little  Daily Activity - Total Score: 13  Education Documentation  Body Mechanics, taught by RISSA Looney at 7/9/2024  3:56 PM.  Learner: Patient  Readiness: Acceptance  Method: Explanation, Demonstration  Response:  Verbalizes Understanding, Demonstrated Understanding, Needs Reinforcement    Precautions, taught by RISSA Looney at 7/9/2024  3:56 PM.  Learner: Patient  Readiness: Acceptance  Method: Explanation, Demonstration  Response: Verbalizes Understanding, Demonstrated Understanding, Needs Reinforcement    Education Comments  No comments found.            Goals:  Encounter Problems       Encounter Problems (Active)       OT Goals       Patient will complete transfer to chair with mod A. (Progressing)       Start:  07/02/24    Expected End:  07/16/24            Patient will complete grooming with set up. (Progressing)       Start:  07/02/24    Expected End:  07/16/24            Patient will complete bed mobility with min A. (Progressing)       Start:  07/02/24    Expected End:  07/16/24

## 2024-07-09 NOTE — NURSING NOTE
1605: Attempted to call report to [facility], no answer at this time.    1725: Report called to nurse at facility at this time. Pick-up time was schedule for 1600, transport running late.     1750: Discharge note- Pt PIV removed prior to discharge. Discharge papers sent with transporters. Report called to facility. Pt discharged to [facility] at this time.

## 2024-07-09 NOTE — DISCHARGE INSTRUCTIONS
Please follow-up with your primary care provider within 7 days for hospital follow-up.  You will need to follow up with Cardiology to discuss anticoagulation as your platelet count is very low, and you may benefit from intervention. You may also benefit from outpatient follow up with Endocrinology, for further management of your adrenal insufficiency and possible bisphosphonate therapy given your history of chronic steroid use and osteoporosis. Please call to make these appointments.    Medication changes:  Change how you take your hydrocortisone (Cortef) to 20 mg by mouth once daily in the morning, and 10 mg by mouth once daily in the afternoon.  Stop taking your Eliquis until you have discussed this medication with Cardiology.    Otherwise, please take your medication as prescribed.    Wound care recommended applying Xeroform and Mepilex to open wound areas, applying lotion to scar tissue, and then covering with Telfa and Kerlix to protect from heel boots     If you have any new or worsening symptoms, seek medical attention.    Thank you for allowing us to participate in your medical care!    -[facility] inpatient medicine teaching service

## 2024-07-09 NOTE — PROGRESS NOTES
Physical Therapy    Physical Therapy Treatment    Patient Name: Bing Holliday  MRN: [id number]  Today's Date: 7/9/2024  Time Calculation  Start Time: 1120  Stop Time: 1150  Time Calculation (min): 30 min     3023/3023-A       07/09/24 1120   PT  Visit   PT Received On 07/09/24   Response to Previous Treatment Patient with no complaints from previous session.   General   Reason for Referral impaired self care   Referred By Dr. Marin   Past Medical History Relevant to Rehab SLE, COPD, GERD, Afib, pacemaker, CAD   Prior to Session Communication Bedside nurse   Patient Position Received Bed, 3 rail up;Alarm on   Preferred Learning Style verbal;visual   General Comment patient soft spoken, cooperative, mildly confused and hallicinating   Precautions   Medical Precautions Fall precautions   Pain Assessment   Pain Assessment 0-10   0-10 (Numeric) Pain Score 0 - No pain   Cognition   Orientation Level Disoriented to situation;Disoriented to time   Coordination   Movements are Fluid and Coordinated No   Postural Control   Postural Control Impaired   Posture Comment slumped posture sitting EOB   Static Sitting Balance   Static Sitting-Balance Support No upper extremity supported   Static Sitting-Level of Assistance Close supervision   Static Standing Balance   Static Standing-Balance Support No upper extremity supported   Static Standing-Level of Assistance Close supervision   Therapeutic Exercise   Therapeutic Exercise Performed Yes   Therapeutic Exercise Activity 1 ankle pumps x15   Therapeutic Exercise Activity 2 resisted leg press x10   Therapeutic Exercise Activity 3 heel slides x10   Therapeutic Exercise Activity 4 hip ABD x10   Therapeutic Exercise Activity 5 pillow squeeze x15   Therapeutic Exercise Activity 6 STS  x 5  (contact guard in elvin stedy from partial seated position)   Bed Mobility   Bed Mobility Yes   Bed Mobility 1   Bed Mobility 1 Supine to sitting   Level of Assistance 1 Minimum assistance    Transfers   Transfer Yes   Transfer 1   Transfer From 1 Bed to   Transfer to 1 Stand   Technique 1 Sit to stand;Stand to sit   Transfer Device 1 Elvin Borrero   Transfer Level of Assistance 1 Minimum assistance   Transfers 2   Transfer From 2 Stand to   Transfer to 2 Chair with arms   Technique 2 Stand to sit   Transfer Device 2 Elvin Borrero   Transfer Level of Assistance 2 Minimum assistance   Activity Tolerance   Endurance Tolerates 30 min exercise with multiple rests   PT Assessment   PT Assessment Results Decreased strength;Decreased endurance;Decreased mobility;Decreased range of motion;Decreased cognition;Impaired judgement;Decreased safety awareness   Rehab Prognosis Good   End of Session Communication Bedside nurse   Assessment Comment Pt tolerated tx well.She was able to do STS in the elvin borrero and completed her exercises in bed with more ease today.   End of Session Patient Position Up in chair;Alarm on   Outpatient Education   Individual(s) Educated Patient   Education Provided Fall Risk;Body Mechanics;POC;Posture   Patient/Caregiver Demonstrated Understanding yes   Plan of Care Discussed and Agreed Upon yes   Patient Response to Education Patient/Caregiver Verbalized Understanding of Information   PT Plan   Inpatient/Swing Bed or Outpatient Inpatient   PT Plan   Treatment/Interventions Bed mobility;Transfer training;Gait training;Therapeutic exercise   PT Plan Ongoing PT   PT Frequency 3 times per week   PT Discharge Recommendations Moderate intensity level of continued care   Equipment Recommended upon Discharge Wheelchair       Outcome Measures:  [facility] Basic Mobility  Turning from your back to your side while in a flat bed without using bedrails: A little  Moving from lying on your back to sitting on the side of a flat bed without using bedrails: A little  Moving to and from bed to chair (including a wheelchair): A lot  Standing up from a chair using your arms (e.g. wheelchair or bedside chair): A  lot  To walk in hospital room: Total  Climbing 3-5 steps with railing: Total  Basic Mobility - Total Score: 12         EDUCATION:  Outpatient Education  Individual(s) Educated: Patient  Education Provided: Fall Risk, Body Mechanics, POC, Posture  Patient/Caregiver Demonstrated Understanding: yes  Plan of Care Discussed and Agreed Upon: yes  Patient Response to Education: Patient/Caregiver Verbalized Understanding of Information    GOALS:  Encounter Problems       Encounter Problems (Active)       Balance       Pt will demonstrate static/dynamic sitting balance for >/= 5 min CGA with UE/LE support without evidence of instability or LOB.        Start:  07/02/24    Expected End:  07/16/24            Pt will demonstrate static/dynamic standing balance for >/= 3 min mod A x2 without evidence of instability or LOB with functional mobility tasks.         Start:  07/02/24    Expected End:  07/16/24                   Mobility       Pt will complete supine, seated and standing exercises to maintain/improve overall strength with minimal verbal cues.         Start:  07/02/24    Expected End:  07/16/24               PT Transfers       Pt will be able to complete all bed mobility tasks with use of bed HR mod A.       Start:  07/02/24    Expected End:  07/16/24            Pt will be able to complete all transfers with least restrictive device mod A x2 demonstrating good safety awareness and proper body mechanics.        Start:  07/02/24    Expected End:  07/16/24               Pain - Adult

## 2024-07-09 NOTE — PROGRESS NOTES
07/09/24 1440   Discharge Planning   Type of Residence Nursing home/residential care   Home or Post Acute Services Post acute facilities (Rehab/SNF/etc)   Type of Post Acute Facility Services Skilled nursing   Patient expects to be discharged to: rtn to [facility]   Has discharge transport been arranged? Yes   What day is the transport expected? 07/09/24   What time is the transport expected? 1600   Patient Choice   Patient / Family choosing to utilize agency / facility established prior to hospitalization Yes     Received auth. Asked the DSC to send the [facility] and AVS. Transport has been set for 4pm . Updated the facility, nursing, pt and her son.

## 2024-07-09 NOTE — PROGRESS NOTES
07/09/24 1252   Discharge Planning   Home or Post Acute Services Post acute facilities (Rehab/SNF/etc)   Type of Post Acute Facility Services Skilled nursing   Patient expects to be discharged to: return to [place] [facility]     Received message from [place] that patient has auth to return.  Notified nursing and MD. Per MD, patient will be ready to d/c today. LSW to arrange transport once discharge orders are complete.

## 2024-07-09 NOTE — NURSING NOTE
Pt temperature read 29.9 at 0400 vitals. Oral temp read 90.1. This nurse notified provider. Provider asked for rectal temp which was 92.3. Provider gave order to get morning labs early and give morning steroid early.  Pt remains lethargic but responds to voice. Pt tolerated all care given. Pt safety maintained at all times.

## 2024-07-10 ENCOUNTER — NURSING HOME VISIT (OUTPATIENT)
Dept: POST ACUTE CARE | Facility: EXTERNAL LOCATION | Age: 64
End: 2024-07-10
Payer: MEDICARE

## 2024-07-10 VITALS
OXYGEN SATURATION: 96 % | DIASTOLIC BLOOD PRESSURE: 70 MMHG | SYSTOLIC BLOOD PRESSURE: 126 MMHG | RESPIRATION RATE: 18 BRPM | WEIGHT: 211 LBS | HEART RATE: 72 BPM | TEMPERATURE: 98.2 F | BODY MASS INDEX: 30.28 KG/M2

## 2024-07-10 DIAGNOSIS — D69.6 THROMBOCYTOPENIA (CMS-HCC): ICD-10-CM

## 2024-07-10 DIAGNOSIS — E11.42 DM TYPE 2 WITH DIABETIC PERIPHERAL NEUROPATHY (MULTI): Chronic | ICD-10-CM

## 2024-07-10 DIAGNOSIS — M32.14 LUPUS NEPHRITIS (MULTI): ICD-10-CM

## 2024-07-10 DIAGNOSIS — N18.31 CKD STAGE G3A/A2, GFR 45-59 AND ALBUMIN CREATININE RATIO 30-299 MG/G (MULTI): Chronic | ICD-10-CM

## 2024-07-10 DIAGNOSIS — G93.40 ENCEPHALOPATHY: ICD-10-CM

## 2024-07-10 DIAGNOSIS — M32.9 LUPUS (MULTI): Chronic | ICD-10-CM

## 2024-07-10 DIAGNOSIS — E27.40 ADRENAL INSUFFICIENCY (MULTI): ICD-10-CM

## 2024-07-10 DIAGNOSIS — R26.2 AMBULATORY DYSFUNCTION: ICD-10-CM

## 2024-07-10 DIAGNOSIS — I48.0 PAF (PAROXYSMAL ATRIAL FIBRILLATION) (MULTI): ICD-10-CM

## 2024-07-10 DIAGNOSIS — J18.9 PNEUMONIA OF RIGHT LOWER LOBE DUE TO INFECTIOUS ORGANISM: Primary | ICD-10-CM

## 2024-07-10 PROBLEM — J44.9 ADVANCED COPD (MULTI): Chronic | Status: RESOLVED | Noted: 2023-10-30 | Resolved: 2024-07-10

## 2024-07-10 PROBLEM — Z86.2 HISTORY OF THROMBOCYTOPENIA: Status: ACTIVE | Noted: 2024-03-10

## 2024-07-10 LAB
ATRIAL RATE: 66 BPM
Q ONSET: 216 MS
QRS COUNT: 10 BEATS
QRS DURATION: 100 MS
QT INTERVAL: 460 MS
QTC CALCULATION(BAZETT): 482 MS
QTC FREDERICIA: 475 MS
R AXIS: -1 DEGREES
T AXIS: -63 DEGREES
T OFFSET: 446 MS
VENTRICULAR RATE: 66 BPM

## 2024-07-10 PROCEDURE — 99310 SBSQ NF CARE HIGH MDM 45: CPT | Performed by: NURSE PRACTITIONER

## 2024-07-10 ASSESSMENT — ENCOUNTER SYMPTOMS
APPETITE CHANGE: 1
DIAPHORESIS: 0
CONFUSION: 1
CONSTIPATION: 0
MYALGIAS: 0
FATIGUE: 1
VOMITING: 0
WOUND: 1
ACTIVITY CHANGE: 1
SHORTNESS OF BREATH: 0
COUGH: 0
DIARRHEA: 0
JOINT SWELLING: 0
NAUSEA: 0
CHILLS: 0
FEVER: 0

## 2024-07-10 NOTE — ASSESSMENT & PLAN NOTE
On  metoprolol.  Rate controlled.  continue with current medical management.  Eliquis was discontinued in the hospital due to thrombocytopenia with recommendation to follow up with cardiology for consideration of atrial appendage ablation/closure.   Follow up with cardiololgy, I requested staff to contact Dr Arenas her community cardiologist) to see if he wants to follow up earlier than planned appt 8/13 to address anticoagulation and options.

## 2024-07-10 NOTE — ASSESSMENT & PLAN NOTE
As low as 36 in the hospital, increased to 80's on discharge.  Eliquis held.  No sx of bleeding.  Staff to monitor and notify for any bleeding.

## 2024-07-10 NOTE — LETTER
Patient: Bing Holliday  : 1960    Encounter Date: 07/10/2024    Subjective  Bing Holliday is a 63 y.o. female returns from hospitalization due to encephalopathy, sepsis, pna, adrenal insufficiency.  HPI   She had increased confusion, weakness and hypotension, found to have sepsis, encephalopathy, hypothermia, hypotension, RLL pna.  She required vasopressors and clinically improved with IV fluids and atb.  She received 1 unit prbc.   She was initally not swallowing well, per notes she passed a swallow evaluation and will continue to work with speech therapy.   She was evaluated by endocrinology and hydrocortisone dose decreased and recommnedation to follow up to consider biphosphonate therapy due to chronic steroids.   She had persistent thrombocytopenia and eliquis was held with recommendation to follow up with cardiology to consider further anticoagulation or possibel atrial appendage occlusion (Watchman) due to PAF.  She appears weak and deconditioned but more awake and stronger than the day of hospital admission.  She denies any pain when asked.     Labs:    WBC: 4.5  Hgb: 9.0  Hct: 31.1  Platelet: 85    Na: 146  K: 4.7  Cl: 115  Co2: 22  BUN: 23  Creatinine: 1.39  GFR: 43     Magnesium 1.62    MEDS:  Albuterol-budesonide  Atorvastatin  Fludrocortisone  Folic acid  Lasix  Hydrocortisone (dose decreased)  Lispro ss coverage  Lantus  Keppra  Melatonin  Metoprolol tartrate  Mucinex  MVI  Mycophenolate  Paliperidone  Pantoprazole  Kcl  Sertraline  Thiamine  Trelegy  Venelex ointment            Review of Systems   Constitutional:  Positive for activity change, appetite change and fatigue. Negative for chills, diaphoresis and fever.   Respiratory:  Negative for cough and shortness of breath.    Cardiovascular:  Negative for chest pain and leg swelling.   Gastrointestinal:  Negative for constipation, diarrhea, nausea and vomiting.   Musculoskeletal:  Negative for joint swelling and myalgias.   Skin:   Positive for wound.   Psychiatric/Behavioral:  Positive for confusion (mild, reorients easily).        Objective  /70   Pulse 72   Temp 36.8 °C (98.2 °F)   Resp 18   Wt 95.7 kg (211 lb)   LMP  (LMP Unknown)   SpO2 96%   BMI 30.28 kg/m²     Physical Exam  Vitals reviewed.   Constitutional:       General: She is not in acute distress.     Appearance: She is ill-appearing. She is not toxic-appearing.   Cardiovascular:      Rate and Rhythm: Normal rate and regular rhythm.      Pulses: Normal pulses.      Heart sounds:      No gallop.   Pulmonary:      Breath sounds: Normal breath sounds. No wheezing, rhonchi or rales.   Abdominal:      General: Abdomen is flat. Bowel sounds are normal.      Palpations: Abdomen is soft.      Tenderness: There is no guarding or rebound.   Musculoskeletal:      Right lower leg: Edema present.      Left lower leg: Edema present.   Skin:     Comments: Patient has a lower extremity venous stasis ulcer currently covered and dry   Neurological:      Motor: Weakness present.      Coordination: Coordination abnormal.      Comments: mildly confused         Assessment/Plan  Problem List Items Addressed This Visit       Lupus (Multi) (Chronic)     Follow up with specialists as scheduled.          Ambulatory dysfunction     continue with therapy as able.  She is weak, frail and deconditioned.             PAF (paroxysmal atrial fibrillation) (Multi)     On  metoprolol.  Rate controlled.  continue with current medical management.  Eliquis was discontinued in the hospital due to thrombocytopenia with recommendation to follow up with cardiology for consideration of atrial appendage ablation/closure.   Follow up with cardiololgy, I requested staff to contact Dr Arenas her community cardiologist) to see if he wants to follow up earlier than planned appt 8/13 to address anticoagulation and options.            CKD stage G3a/A2, GFR 45-59 and albumin creatinine ratio  mg/g (Multi)  (Chronic)     monitor with routine labs.           Lupus nephritis (Multi)     Follow up with nephrology and endocrinology.          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   Continue to monitor and adjust meds based on clinical course.         Adrenal insufficiency (Multi)     On fludorocortisone  and hydrocortisone.   She was evaluated by endocrinology in the hospital, hydrocortisone decreased.   Also will follow up with endocrinology for consideration of biphosphonates.   continue with current medical management           Encephalopathy     Clinically improved,   Staff to monitor and notify for any recurrence of symptoms.             Thrombocytopenia (CMS-HCC)     As low as 36 in the hospital, increased to 80's on discharge.  Eliquis held.  No sx of bleeding.  Staff to monitor and notify for any bleeding.          Pneumonia of right lower lobe due to infectious organism - Primary     She completed atb in the hospital, denies any cough, sob.  She states she is feeling better.  Staff to monitor and notify for any recurrence of symptoms.  As she had RLL pna concern dose exist for possible aspiration.   She is being followed by speech therapy, on regular diet with thin liquids.  Staff to monitor and notify for any recurrence of symptoms.          labs/meds/orders reviewed  staff to monitor and notify for any changes.  labs/meds/orders reviewed  staff to monitor and notify for any changes.  Hospital records reviewed.  continue with therapy as able.  She appears very weak and debiliated, ss for discharge planning.    She appears high risk for complication, staff to closely monitor for any changes.   Follow up with cardiology regarding discontinuation of eliquis and consideration of atrial appendage closure/Watchman  Time for coordination of care was greater than 35 minutes with hospital chart review, visit and exam, discussion of treatment plan with patient and  also discussion of case with staff.      Electronically Signed By: ASIM Bennett   7/10/24  3:01 PM

## 2024-07-10 NOTE — ASSESSMENT & PLAN NOTE
On fludorocortisone  and hydrocortisone.   She was evaluated by endocrinology in the hospital, hydrocortisone decreased.   Also will follow up with endocrinology for consideration of biphosphonates.   continue with current medical management

## 2024-07-10 NOTE — PROGRESS NOTES
Subjective   Bing Holliday is a 63 y.o. female returns from hospitalization due to encephalopathy, sepsis, pna, adrenal insufficiency.  HPI   She had increased confusion, weakness and hypotension, found to have sepsis, encephalopathy, hypothermia, hypotension, RLL pna.  She required vasopressors and clinically improved with IV fluids and atb.  She received 1 unit prbc.   She was initally not swallowing well, per notes she passed a swallow evaluation and will continue to work with speech therapy.   She was evaluated by endocrinology and hydrocortisone dose decreased and recommnedation to follow up to consider biphosphonate therapy due to chronic steroids.   She had persistent thrombocytopenia and eliquis was held with recommendation to follow up with cardiology to consider further anticoagulation or possibel atrial appendage occlusion (Watchman) due to PAF.  She appears weak and deconditioned but more awake and stronger than the day of hospital admission.  She denies any pain when asked.     Labs:  7/9  WBC: 4.5  Hgb: 9.0  Hct: 31.1  Platelet: 85    Na: 146  K: 4.7  Cl: 115  Co2: 22  BUN: 23  Creatinine: 1.39  GFR: 43    7/8 Magnesium 1.62    MEDS:  Albuterol-budesonide  Atorvastatin  Fludrocortisone  Folic acid  Lasix  Hydrocortisone (dose decreased)  Lispro ss coverage  Lantus  Keppra  Melatonin  Metoprolol tartrate  Mucinex  MVI  Mycophenolate  Paliperidone  Pantoprazole  Kcl  Sertraline  Thiamine  Trelegy  Venelex ointment            Review of Systems   Constitutional:  Positive for activity change, appetite change and fatigue. Negative for chills, diaphoresis and fever.   Respiratory:  Negative for cough and shortness of breath.    Cardiovascular:  Negative for chest pain and leg swelling.   Gastrointestinal:  Negative for constipation, diarrhea, nausea and vomiting.   Musculoskeletal:  Negative for joint swelling and myalgias.   Skin:  Positive for wound.   Psychiatric/Behavioral:  Positive for confusion  (mild, reorients easily).        Objective   /70   Pulse 72   Temp 36.8 °C (98.2 °F)   Resp 18   Wt 95.7 kg (211 lb)   LMP  (LMP Unknown)   SpO2 96%   BMI 30.28 kg/m²     Physical Exam  Vitals reviewed.   Constitutional:       General: She is not in acute distress.     Appearance: She is ill-appearing. She is not toxic-appearing.   Cardiovascular:      Rate and Rhythm: Normal rate and regular rhythm.      Pulses: Normal pulses.      Heart sounds:      No gallop.   Pulmonary:      Breath sounds: Normal breath sounds. No wheezing, rhonchi or rales.   Abdominal:      General: Abdomen is flat. Bowel sounds are normal.      Palpations: Abdomen is soft.      Tenderness: There is no guarding or rebound.   Musculoskeletal:      Right lower leg: Edema present.      Left lower leg: Edema present.   Skin:     Comments: Patient has a lower extremity venous stasis ulcer currently covered and dry   Neurological:      Motor: Weakness present.      Coordination: Coordination abnormal.      Comments: mildly confused         Assessment/Plan   Problem List Items Addressed This Visit       Lupus (Multi) (Chronic)     Follow up with specialists as scheduled.          Ambulatory dysfunction     continue with therapy as able.  She is weak, frail and deconditioned.             PAF (paroxysmal atrial fibrillation) (Multi)     On  metoprolol.  Rate controlled.  continue with current medical management.  Eliquis was discontinued in the hospital due to thrombocytopenia with recommendation to follow up with cardiology for consideration of atrial appendage ablation/closure.   Follow up with cardiololgy, I requested staff to contact Dr Arenas her community cardiologist) to see if he wants to follow up earlier than planned appt 8/13 to address anticoagulation and options.            CKD stage G3a/A2, GFR 45-59 and albumin creatinine ratio  mg/g (Multi) (Chronic)     monitor with routine labs.           Lupus nephritis (Multi)      Follow up with nephrology and endocrinology.          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   Continue to monitor and adjust meds based on clinical course.         Adrenal insufficiency (Multi)     On fludorocortisone  and hydrocortisone.   She was evaluated by endocrinology in the hospital, hydrocortisone decreased.   Also will follow up with endocrinology for consideration of biphosphonates.   continue with current medical management           Encephalopathy     Clinically improved,   Staff to monitor and notify for any recurrence of symptoms.             Thrombocytopenia (CMS-HCC)     As low as 36 in the hospital, increased to 80's on discharge.  Eliquis held.  No sx of bleeding.  Staff to monitor and notify for any bleeding.          Pneumonia of right lower lobe due to infectious organism - Primary     She completed atb in the hospital, denies any cough, sob.  She states she is feeling better.  Staff to monitor and notify for any recurrence of symptoms.  As she had RLL pna concern dose exist for possible aspiration.   She is being followed by speech therapy, on regular diet with thin liquids.  Staff to monitor and notify for any recurrence of symptoms.          labs/meds/orders reviewed  staff to monitor and notify for any changes.  labs/meds/orders reviewed  staff to monitor and notify for any changes.  Hospital records reviewed.  continue with therapy as able.  She appears very weak and debiliated, ss for discharge planning.    She appears high risk for complication, staff to closely monitor for any changes.   Follow up with cardiology regarding discontinuation of eliquis and consideration of atrial appendage closure/Watchman  Time for coordination of care was greater than 35 minutes with hospital chart review, visit and exam, discussion of treatment plan with patient and also discussion of case with staff.

## 2024-07-10 NOTE — ASSESSMENT & PLAN NOTE
She completed atb in the hospital, denies any cough, sob.  She states she is feeling better.  Staff to monitor and notify for any recurrence of symptoms.  As she had RLL pna concern dose exist for possible aspiration.   She is being followed by speech therapy, on regular diet with thin liquids.  Staff to monitor and notify for any recurrence of symptoms.

## 2024-07-12 ENCOUNTER — NURSING HOME VISIT (OUTPATIENT)
Dept: POST ACUTE CARE | Facility: EXTERNAL LOCATION | Age: 64
End: 2024-07-12
Payer: MEDICARE

## 2024-07-12 DIAGNOSIS — G93.40 ENCEPHALOPATHY: ICD-10-CM

## 2024-07-12 DIAGNOSIS — I10 BENIGN ESSENTIAL HTN: Chronic | ICD-10-CM

## 2024-07-12 DIAGNOSIS — M32.14 LUPUS NEPHRITIS (MULTI): ICD-10-CM

## 2024-07-12 DIAGNOSIS — M32.19 LUPUS VASCULITIS (MULTI): Chronic | ICD-10-CM

## 2024-07-12 DIAGNOSIS — M05.742 RHEUMATOID ARTHRITIS INVOLVING BOTH HANDS WITH POSITIVE RHEUMATOID FACTOR (MULTI): ICD-10-CM

## 2024-07-12 DIAGNOSIS — J44.9 ADVANCED COPD (MULTI): Chronic | ICD-10-CM

## 2024-07-12 DIAGNOSIS — I77.89 LUPUS VASCULITIS (MULTI): Chronic | ICD-10-CM

## 2024-07-12 DIAGNOSIS — J18.9 PNEUMONIA OF RIGHT LOWER LOBE DUE TO INFECTIOUS ORGANISM: Primary | ICD-10-CM

## 2024-07-12 DIAGNOSIS — M05.741 RHEUMATOID ARTHRITIS INVOLVING BOTH HANDS WITH POSITIVE RHEUMATOID FACTOR (MULTI): ICD-10-CM

## 2024-07-12 DIAGNOSIS — E11.42 DM TYPE 2 WITH DIABETIC PERIPHERAL NEUROPATHY (MULTI): Chronic | ICD-10-CM

## 2024-07-12 DIAGNOSIS — N18.4 CHRONIC KIDNEY DISEASE, STAGE 4 (SEVERE) (MULTI): ICD-10-CM

## 2024-07-12 DIAGNOSIS — R53.82 CHRONIC FATIGUE: ICD-10-CM

## 2024-07-12 DIAGNOSIS — M32.9 SYSTEMIC LUPUS ERYTHEMATOSUS, UNSPECIFIED SLE TYPE, UNSPECIFIED ORGAN INVOLVEMENT STATUS (MULTI): Chronic | ICD-10-CM

## 2024-07-12 PROCEDURE — 99306 1ST NF CARE HIGH MDM 50: CPT | Performed by: INTERNAL MEDICINE

## 2024-07-12 NOTE — LETTER
Patient: Bing Holliday  : 1960    Encounter Date: 2024    Subjective  Patient ID: Bing Holliday is a 63 y.o. female who is acute skilled care being seen and evaluated for multiple medical problems.    HPI   Bing Holliday is a 63 y.o. female returns from hospitalization due to encephalopathy, sepsis, pna, adrenal insufficiency.  HPI   She had increased confusion, weakness and hypotension, found to have sepsis, encephalopathy, hypothermia, hypotension, RLL pna.  She required vasopressors and clinically improved with IV fluids and atb.  She received 1 unit prbc.   She was initally not swallowing well, per notes she passed a swallow evaluation and will continue to work with speech therapy.   She was evaluated by endocrinology and hydrocortisone dose decreased and recommnedation to follow up to consider biphosphonate therapy due to chronic steroids.   She had persistent thrombocytopenia and eliquis was held with recommendation to follow up with cardiology to consider further anticoagulation or possibel atrial appendage occlusion (Watchman) due to PAF.  She appears weak and deconditioned but more awake and stronger than the day of hospital admission.  She denies any pain when asked.    The patient is sitting up in her bed watching television.  She reports that she feels okay to me.  She denies pain or shortness of breath.  As above note is made of extensive medication changes that were initiated in the hospital.  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     Labs:    WBC: 4.5  Hgb: 9.0  Hct: 31.1  Platelet: 85     Na: 146  K: 4.7  Cl: 115  Co2: 22  BUN: 23  Creatinine: 1.39  GFR: 43     /8 Magnesium 1.62     MEDS:  Albuterol-budesonide  Atorvastatin  Fludrocortisone  Folic acid  Lasix  Hydrocortisone (dose decreased)  Lispro ss  coverage  Lantus  Keppra  Melatonin  Metoprolol tartrate  Mucinex  MVI  Mycophenolate  Paliperidone  Pantoprazole  Kcl  Sertraline  Thiamine  Trelegy  Venelex ointment  Review of Systems   Constitutional:  Positive for activity change, appetite change and fatigue. Negative for chills, diaphoresis and fever.   Respiratory:  Negative for cough and shortness of breath.    Cardiovascular:  Negative for chest pain and leg swelling.   Gastrointestinal:  Negative for constipation, diarrhea, nausea and vomiting.   Musculoskeletal:  Negative for joint swelling and myalgias.   Skin:  Positive for wound.   Psychiatric/Behavioral:  Positive for confusion (mild, reorients easily).        Objective  /78   Pulse 72   Temp 36.1 °C (97 °F)   Resp 18   Wt 94.3 kg (208 lb)   LMP  (LMP Unknown)   SpO2 96%   BMI 29.84 kg/m²     Physical Exam  Vitals reviewed.   Constitutional:       General: She is not in acute distress.     Appearance: She is ill-appearing. She is not toxic-appearing.   Cardiovascular:      Rate and Rhythm: Normal rate and regular rhythm.      Pulses: Normal pulses.      Heart sounds:      No gallop.   Pulmonary:      Breath sounds: Normal breath sounds. No wheezing, rhonchi or rales.   Abdominal:      General: Abdomen is flat. Bowel sounds are normal.      Palpations: Abdomen is soft.      Tenderness: There is no guarding or rebound.   Musculoskeletal:      Right lower leg: Edema present.      Left lower leg: Edema present.   Skin:     Comments: Patient has a lower extremity venous stasis ulcer currently covered and dry   Neurological:      Motor: Weakness present.      Coordination: Coordination abnormal.      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  We will continue with rehabilitative restorative and supportive cares patient tolerance    B.  Laboratory examinations will continue to be monitored on an ongoing as-needed basis    C.  The patient will have close outpatient follow-up with her multiple subspecialists regarding what is very likely mixed connective tissue disorder with overlap syndromes between multiple autoimmune diseases.  She appears to have had multiple organ systems were evidenced by chronic systemic lupus and rheumatoid arthritis.    D.  The patient's prognosis is [id number].        Electronically Signed By: Jose Mcginnis MD   7/23/24 11:46 AM

## 2024-07-15 ENCOUNTER — APPOINTMENT (OUTPATIENT)
Dept: CARDIOLOGY | Facility: HOSPITAL | Age: 64
DRG: 871 | End: 2024-07-15
Payer: MEDICARE

## 2024-07-15 ENCOUNTER — APPOINTMENT (OUTPATIENT)
Dept: RADIOLOGY | Facility: HOSPITAL | Age: 64
DRG: 871 | End: 2024-07-15
Payer: MEDICARE

## 2024-07-15 ENCOUNTER — HOSPITAL ENCOUNTER (INPATIENT)
Facility: HOSPITAL | Age: 64
DRG: 871 | End: 2024-07-15
Attending: EMERGENCY MEDICINE | Admitting: INTERNAL MEDICINE
Payer: MEDICARE

## 2024-07-15 DIAGNOSIS — E16.2 HYPOGLYCEMIA: ICD-10-CM

## 2024-07-15 DIAGNOSIS — M32.14 LUPUS NEPHRITIS (MULTI): ICD-10-CM

## 2024-07-15 DIAGNOSIS — D62 ACUTE BLOOD LOSS ANEMIA: ICD-10-CM

## 2024-07-15 DIAGNOSIS — G47.00 INSOMNIA, UNSPECIFIED TYPE: ICD-10-CM

## 2024-07-15 DIAGNOSIS — F29 PSYCHOSIS, UNSPECIFIED PSYCHOSIS TYPE (MULTI): ICD-10-CM

## 2024-07-15 DIAGNOSIS — A41.9 SEPTIC SHOCK (MULTI): ICD-10-CM

## 2024-07-15 DIAGNOSIS — E11.42 DM TYPE 2 WITH DIABETIC PERIPHERAL NEUROPATHY (MULTI): Chronic | ICD-10-CM

## 2024-07-15 DIAGNOSIS — T68.XXXA HYPOTHERMIA, INITIAL ENCOUNTER: Primary | ICD-10-CM

## 2024-07-15 DIAGNOSIS — M32.10 SYSTEMIC LUPUS ERYTHEMATOSUS, ORGAN OR SYSTEM INVOLVEMENT UNSPECIFIED (MULTI): ICD-10-CM

## 2024-07-15 DIAGNOSIS — E78.49 OTHER HYPERLIPIDEMIA: Chronic | ICD-10-CM

## 2024-07-15 DIAGNOSIS — R62.7 FAILURE TO THRIVE IN ADULT: ICD-10-CM

## 2024-07-15 DIAGNOSIS — R56.9 SEIZURE-LIKE ACTIVITY (MULTI): ICD-10-CM

## 2024-07-15 DIAGNOSIS — R06.02 SHORTNESS OF BREATH ON EXERTION: ICD-10-CM

## 2024-07-15 DIAGNOSIS — K51.011 ULCERATIVE (CHRONIC) PANCOLITIS WITH RECTAL BLEEDING (MULTI): ICD-10-CM

## 2024-07-15 DIAGNOSIS — R56.9 SEIZURE (MULTI): ICD-10-CM

## 2024-07-15 DIAGNOSIS — I48.0 PAF (PAROXYSMAL ATRIAL FIBRILLATION) (MULTI): ICD-10-CM

## 2024-07-15 DIAGNOSIS — R04.0 EPISTAXIS: ICD-10-CM

## 2024-07-15 DIAGNOSIS — I95.9 HYPOTENSION, UNSPECIFIED HYPOTENSION TYPE: ICD-10-CM

## 2024-07-15 DIAGNOSIS — K57.92 ACUTE DIVERTICULITIS: ICD-10-CM

## 2024-07-15 DIAGNOSIS — R65.21 SEPTIC SHOCK (MULTI): ICD-10-CM

## 2024-07-15 DIAGNOSIS — E27.40 ADRENAL INSUFFICIENCY (MULTI): ICD-10-CM

## 2024-07-15 LAB
ALBUMIN SERPL BCP-MCNC: 2.5 G/DL (ref 3.4–5)
ALP SERPL-CCNC: 134 U/L (ref 33–136)
ALT SERPL W P-5'-P-CCNC: 27 U/L (ref 7–45)
AMMONIA PLAS-SCNC: 11 UMOL/L (ref 16–53)
ANION GAP BLDV CALCULATED.4IONS-SCNC: 12 MMOL/L (ref 10–25)
ANION GAP SERPL CALC-SCNC: 13 MMOL/L (ref 10–20)
APPEARANCE UR: CLEAR
AST SERPL W P-5'-P-CCNC: 34 U/L (ref 9–39)
BASE EXCESS BLDV CALC-SCNC: -3.2 MMOL/L (ref -2–3)
BASOPHILS # BLD AUTO: 0.01 X10*3/UL (ref 0–0.1)
BASOPHILS NFR BLD AUTO: 0.4 %
BILIRUB SERPL-MCNC: 0.3 MG/DL (ref 0–1.2)
BILIRUB UR STRIP.AUTO-MCNC: NEGATIVE MG/DL
BODY TEMPERATURE: ABNORMAL
BUN SERPL-MCNC: 28 MG/DL (ref 6–23)
CA-I BLDV-SCNC: 1.18 MMOL/L (ref 1.1–1.33)
CALCIUM SERPL-MCNC: 8 MG/DL (ref 8.6–10.3)
CARDIAC TROPONIN I PNL SERPL HS: 32 NG/L (ref 0–13)
CARDIAC TROPONIN I PNL SERPL HS: 45 NG/L (ref 0–13)
CHLORIDE BLDV-SCNC: 113 MMOL/L (ref 98–107)
CHLORIDE SERPL-SCNC: 113 MMOL/L (ref 98–107)
CO2 SERPL-SCNC: 22 MMOL/L (ref 21–32)
COLOR UR: YELLOW
CREAT SERPL-MCNC: 2.41 MG/DL (ref 0.5–1.05)
CRITICAL CALL TIME: 1101
CRITICAL CALLED BY: ABNORMAL
CRITICAL CALLED TO: ABNORMAL
CRITICAL READ BACK: ABNORMAL
EGFRCR SERPLBLD CKD-EPI 2021: 22 ML/MIN/1.73M*2
EOSINOPHIL # BLD AUTO: 0.02 X10*3/UL (ref 0–0.7)
EOSINOPHIL NFR BLD AUTO: 0.8 %
ERYTHROCYTE [DISTWIDTH] IN BLOOD BY AUTOMATED COUNT: 22 % (ref 11.5–14.5)
GLUCOSE BLD MANUAL STRIP-MCNC: 102 MG/DL (ref 74–99)
GLUCOSE BLD MANUAL STRIP-MCNC: 119 MG/DL (ref 74–99)
GLUCOSE BLD MANUAL STRIP-MCNC: 123 MG/DL (ref 74–99)
GLUCOSE BLD MANUAL STRIP-MCNC: 125 MG/DL (ref 74–99)
GLUCOSE BLD MANUAL STRIP-MCNC: 151 MG/DL (ref 74–99)
GLUCOSE BLD MANUAL STRIP-MCNC: 49 MG/DL (ref 74–99)
GLUCOSE BLDV-MCNC: 51 MG/DL (ref 74–99)
GLUCOSE SERPL-MCNC: 49 MG/DL (ref 74–99)
GLUCOSE UR STRIP.AUTO-MCNC: NORMAL MG/DL
HCO3 BLDV-SCNC: 24.1 MMOL/L (ref 22–26)
HCT VFR BLD AUTO: 28.1 % (ref 36–46)
HCT VFR BLD EST: 26 % (ref 36–46)
HGB BLD-MCNC: 8.4 G/DL (ref 12–16)
HGB BLDV-MCNC: 8.5 G/DL (ref 12–16)
HOLD SPECIMEN: NORMAL
HYALINE CASTS #/AREA URNS AUTO: ABNORMAL /LPF
IMM GRANULOCYTES # BLD AUTO: 0.02 X10*3/UL (ref 0–0.7)
IMM GRANULOCYTES NFR BLD AUTO: 0.8 % (ref 0–0.9)
INR PPP: 1 (ref 0.9–1.1)
KETONES UR STRIP.AUTO-MCNC: NEGATIVE MG/DL
LACTATE BLDV-SCNC: 2.3 MMOL/L (ref 0.4–2)
LACTATE SERPL-SCNC: 1.8 MMOL/L (ref 0.4–2)
LEUKOCYTE ESTERASE UR QL STRIP.AUTO: ABNORMAL
LIPASE SERPL-CCNC: 21 U/L (ref 9–82)
LYMPHOCYTES # BLD AUTO: 0.97 X10*3/UL (ref 1.2–4.8)
LYMPHOCYTES NFR BLD AUTO: 40.9 %
MAGNESIUM SERPL-MCNC: 1.7 MG/DL (ref 1.6–2.4)
MCH RBC QN AUTO: 30.5 PG (ref 26–34)
MCHC RBC AUTO-ENTMCNC: 29.9 G/DL (ref 32–36)
MCV RBC AUTO: 102 FL (ref 80–100)
MONOCYTES # BLD AUTO: 0.35 X10*3/UL (ref 0.1–1)
MONOCYTES NFR BLD AUTO: 14.8 %
MUCOUS THREADS #/AREA URNS AUTO: ABNORMAL /LPF
NEUTROPHILS # BLD AUTO: 1 X10*3/UL (ref 1.2–7.7)
NEUTROPHILS NFR BLD AUTO: 42.3 %
NITRITE UR QL STRIP.AUTO: NEGATIVE
NRBC BLD-RTO: 5.5 /100 WBCS (ref 0–0)
OXYHGB MFR BLDV: 43.6 % (ref 45–75)
PCO2 BLDV: 55 MM HG (ref 41–51)
PH BLDV: 7.25 PH (ref 7.33–7.43)
PH UR STRIP.AUTO: 5 [PH]
PLATELET # BLD AUTO: 68 X10*3/UL (ref 150–450)
PO2 BLDV: 33 MM HG (ref 35–45)
POTASSIUM BLDV-SCNC: 4.4 MMOL/L (ref 3.5–5.3)
POTASSIUM SERPL-SCNC: 4.4 MMOL/L (ref 3.5–5.3)
PROT SERPL-MCNC: 5.1 G/DL (ref 6.4–8.2)
PROT UR STRIP.AUTO-MCNC: ABNORMAL MG/DL
PROTHROMBIN TIME: 11.8 SECONDS (ref 9.8–12.8)
RBC # BLD AUTO: 2.75 X10*6/UL (ref 4–5.2)
RBC # UR STRIP.AUTO: NEGATIVE /UL
RBC #/AREA URNS AUTO: ABNORMAL /HPF
SAO2 % BLDV: 44 % (ref 45–75)
SODIUM BLDV-SCNC: 145 MMOL/L (ref 136–145)
SODIUM SERPL-SCNC: 144 MMOL/L (ref 136–145)
SP GR UR STRIP.AUTO: 1.01
T4 FREE SERPL-MCNC: 0.95 NG/DL (ref 0.61–1.12)
TSH SERPL-ACNC: 4.67 MIU/L (ref 0.44–3.98)
UROBILINOGEN UR STRIP.AUTO-MCNC: NORMAL MG/DL
WBC # BLD AUTO: 2.4 X10*3/UL (ref 4.4–11.3)
WBC #/AREA URNS AUTO: ABNORMAL /HPF

## 2024-07-15 PROCEDURE — 93005 ELECTROCARDIOGRAM TRACING: CPT

## 2024-07-15 PROCEDURE — 83735 ASSAY OF MAGNESIUM: CPT

## 2024-07-15 PROCEDURE — 83690 ASSAY OF LIPASE: CPT

## 2024-07-15 PROCEDURE — 84443 ASSAY THYROID STIM HORMONE: CPT

## 2024-07-15 PROCEDURE — 99291 CRITICAL CARE FIRST HOUR: CPT

## 2024-07-15 PROCEDURE — 2500000005 HC RX 250 GENERAL PHARMACY W/O HCPCS

## 2024-07-15 PROCEDURE — 2500000002 HC RX 250 W HCPCS SELF ADMINISTERED DRUGS (ALT 637 FOR MEDICARE OP, ALT 636 FOR OP/ED)

## 2024-07-15 PROCEDURE — 81001 URINALYSIS AUTO W/SCOPE: CPT

## 2024-07-15 PROCEDURE — 99291 CRITICAL CARE FIRST HOUR: CPT | Mod: 25

## 2024-07-15 PROCEDURE — 99291 CRITICAL CARE FIRST HOUR: CPT | Performed by: EMERGENCY MEDICINE

## 2024-07-15 PROCEDURE — 71045 X-RAY EXAM CHEST 1 VIEW: CPT

## 2024-07-15 PROCEDURE — 36415 COLL VENOUS BLD VENIPUNCTURE: CPT

## 2024-07-15 PROCEDURE — 84132 ASSAY OF SERUM POTASSIUM: CPT

## 2024-07-15 PROCEDURE — 85025 COMPLETE CBC W/AUTO DIFF WBC: CPT

## 2024-07-15 PROCEDURE — 71250 CT THORAX DX C-: CPT

## 2024-07-15 PROCEDURE — 2500000004 HC RX 250 GENERAL PHARMACY W/ HCPCS (ALT 636 FOR OP/ED)

## 2024-07-15 PROCEDURE — 71045 X-RAY EXAM CHEST 1 VIEW: CPT | Performed by: RADIOLOGY

## 2024-07-15 PROCEDURE — 85610 PROTHROMBIN TIME: CPT

## 2024-07-15 PROCEDURE — 84439 ASSAY OF FREE THYROXINE: CPT

## 2024-07-15 PROCEDURE — 3E043XZ INTRODUCTION OF VASOPRESSOR INTO CENTRAL VEIN, PERCUTANEOUS APPROACH: ICD-10-PCS | Performed by: INTERNAL MEDICINE

## 2024-07-15 PROCEDURE — 94640 AIRWAY INHALATION TREATMENT: CPT

## 2024-07-15 PROCEDURE — 84484 ASSAY OF TROPONIN QUANT: CPT

## 2024-07-15 PROCEDURE — 2500000002 HC RX 250 W HCPCS SELF ADMINISTERED DRUGS (ALT 637 FOR MEDICARE OP, ALT 636 FOR OP/ED): Performed by: INTERNAL MEDICINE

## 2024-07-15 PROCEDURE — 2020000001 HC ICU ROOM DAILY

## 2024-07-15 PROCEDURE — 99222 1ST HOSP IP/OBS MODERATE 55: CPT | Performed by: STUDENT IN AN ORGANIZED HEALTH CARE EDUCATION/TRAINING PROGRAM

## 2024-07-15 PROCEDURE — 36620 INSERTION CATHETER ARTERY: CPT

## 2024-07-15 PROCEDURE — 82947 ASSAY GLUCOSE BLOOD QUANT: CPT

## 2024-07-15 PROCEDURE — 87040 BLOOD CULTURE FOR BACTERIA: CPT | Mod: STJLAB

## 2024-07-15 PROCEDURE — 96375 TX/PRO/DX INJ NEW DRUG ADDON: CPT | Mod: 59

## 2024-07-15 PROCEDURE — 83605 ASSAY OF LACTIC ACID: CPT

## 2024-07-15 PROCEDURE — 83735 ASSAY OF MAGNESIUM: CPT | Performed by: NURSE PRACTITIONER

## 2024-07-15 PROCEDURE — 87081 CULTURE SCREEN ONLY: CPT | Mod: STJLAB | Performed by: INTERNAL MEDICINE

## 2024-07-15 PROCEDURE — 96365 THER/PROPH/DIAG IV INF INIT: CPT | Mod: 59

## 2024-07-15 PROCEDURE — 87086 URINE CULTURE/COLONY COUNT: CPT | Mod: STJLAB

## 2024-07-15 PROCEDURE — 02HV33Z INSERTION OF INFUSION DEVICE INTO SUPERIOR VENA CAVA, PERCUTANEOUS APPROACH: ICD-10-PCS | Performed by: INTERNAL MEDICINE

## 2024-07-15 PROCEDURE — 82140 ASSAY OF AMMONIA: CPT

## 2024-07-15 PROCEDURE — 2500000004 HC RX 250 GENERAL PHARMACY W/ HCPCS (ALT 636 FOR OP/ED): Performed by: EMERGENCY MEDICINE

## 2024-07-15 RX ORDER — IPRATROPIUM BROMIDE AND ALBUTEROL SULFATE 2.5; .5 MG/3ML; MG/3ML
3 SOLUTION RESPIRATORY (INHALATION)
Status: DISCONTINUED | OUTPATIENT
Start: 2024-07-15 | End: 2024-07-15

## 2024-07-15 RX ORDER — DEXTROSE 50 % IN WATER (D50W) INTRAVENOUS SYRINGE
25 ONCE
Status: COMPLETED | OUTPATIENT
Start: 2024-07-15 | End: 2024-07-15

## 2024-07-15 RX ORDER — BUDESONIDE 0.5 MG/2ML
0.5 INHALANT ORAL EVERY 6 HOURS PRN
Status: DISCONTINUED | OUTPATIENT
Start: 2024-07-15 | End: 2024-07-15

## 2024-07-15 RX ORDER — FLUTICASONE FUROATE AND VILANTEROL 200; 25 UG/1; UG/1
1 POWDER RESPIRATORY (INHALATION)
Status: DISCONTINUED | OUTPATIENT
Start: 2024-07-16 | End: 2024-07-15

## 2024-07-15 RX ORDER — LINEZOLID 2 MG/ML
600 INJECTION, SOLUTION INTRAVENOUS EVERY 12 HOURS
Status: DISCONTINUED | OUTPATIENT
Start: 2024-07-16 | End: 2024-07-16

## 2024-07-15 RX ORDER — DOCUSATE SODIUM 100 MG/1
100 CAPSULE, LIQUID FILLED ORAL 2 TIMES DAILY
Status: DISCONTINUED | OUTPATIENT
Start: 2024-07-16 | End: 2024-07-30 | Stop reason: HOSPADM

## 2024-07-15 RX ORDER — LANOLIN ALCOHOL/MO/W.PET/CERES
100 CREAM (GRAM) TOPICAL DAILY
Status: DISCONTINUED | OUTPATIENT
Start: 2024-07-15 | End: 2024-07-16

## 2024-07-15 RX ORDER — POLYETHYLENE GLYCOL 3350 17 G/17G
17 POWDER, FOR SOLUTION ORAL DAILY
Status: DISCONTINUED | OUTPATIENT
Start: 2024-07-16 | End: 2024-07-30 | Stop reason: HOSPADM

## 2024-07-15 RX ORDER — LIDOCAINE HYDROCHLORIDE 10 MG/ML
5 INJECTION, SOLUTION EPIDURAL; INFILTRATION; INTRACAUDAL; PERINEURAL ONCE
Status: COMPLETED | OUTPATIENT
Start: 2024-07-15 | End: 2024-07-15

## 2024-07-15 RX ORDER — RISPERIDONE 0.5 MG/1
3 TABLET ORAL 2 TIMES DAILY
Status: DISCONTINUED | OUTPATIENT
Start: 2024-07-15 | End: 2024-07-16

## 2024-07-15 RX ORDER — FOLIC ACID 1 MG/1
1 TABLET ORAL DAILY
Status: DISCONTINUED | OUTPATIENT
Start: 2024-07-15 | End: 2024-07-16

## 2024-07-15 RX ORDER — MYCOPHENOLATE MOFETIL 250 MG/1
1000 CAPSULE ORAL 2 TIMES DAILY
Status: DISCONTINUED | OUTPATIENT
Start: 2024-07-15 | End: 2024-07-30 | Stop reason: HOSPADM

## 2024-07-15 RX ORDER — HEPARIN SODIUM 5000 [USP'U]/ML
5000 INJECTION, SOLUTION INTRAVENOUS; SUBCUTANEOUS EVERY 8 HOURS
Status: DISCONTINUED | OUTPATIENT
Start: 2024-07-15 | End: 2024-07-30 | Stop reason: HOSPADM

## 2024-07-15 RX ORDER — ACETAMINOPHEN 500 MG
5 TABLET ORAL NIGHTLY
Status: DISCONTINUED | OUTPATIENT
Start: 2024-07-15 | End: 2024-07-30 | Stop reason: HOSPADM

## 2024-07-15 RX ORDER — NOREPINEPHRINE BITARTRATE/D5W 8 MG/250ML
PLASTIC BAG, INJECTION (ML) INTRAVENOUS
Status: COMPLETED
Start: 2024-07-15 | End: 2024-07-15

## 2024-07-15 RX ORDER — SERTRALINE HYDROCHLORIDE 25 MG/1
25 TABLET, FILM COATED ORAL NIGHTLY
Status: DISCONTINUED | OUTPATIENT
Start: 2024-07-15 | End: 2024-07-16

## 2024-07-15 RX ORDER — NOREPINEPHRINE BITARTRATE/D5W 8 MG/250ML
0-.2 PLASTIC BAG, INJECTION (ML) INTRAVENOUS CONTINUOUS
Status: DISCONTINUED | OUTPATIENT
Start: 2024-07-15 | End: 2024-07-18

## 2024-07-15 RX ORDER — RISPERIDONE 0.5 MG/1
2 TABLET ORAL 2 TIMES DAILY
Status: DISCONTINUED | OUTPATIENT
Start: 2024-07-15 | End: 2024-07-15

## 2024-07-15 RX ORDER — MAGNESIUM SULFATE HEPTAHYDRATE 40 MG/ML
2 INJECTION, SOLUTION INTRAVENOUS ONCE
Status: COMPLETED | OUTPATIENT
Start: 2024-07-15 | End: 2024-07-15

## 2024-07-15 RX ORDER — INSULIN LISPRO 100 [IU]/ML
0-5 INJECTION, SOLUTION INTRAVENOUS; SUBCUTANEOUS EVERY 4 HOURS
Status: DISCONTINUED | OUTPATIENT
Start: 2024-07-15 | End: 2024-07-24

## 2024-07-15 RX ORDER — BUDESONIDE 0.5 MG/2ML
0.5 INHALANT ORAL
Status: DISCONTINUED | OUTPATIENT
Start: 2024-07-15 | End: 2024-07-30 | Stop reason: HOSPADM

## 2024-07-15 RX ORDER — PHENYLEPHRINE HYDROCHLORIDE 10 MG/ML
200 INJECTION INTRAVENOUS ONCE
Status: COMPLETED | OUTPATIENT
Start: 2024-07-15 | End: 2024-07-15

## 2024-07-15 RX ORDER — DEXTROSE 50 % IN WATER (D50W) INTRAVENOUS SYRINGE
12.5
Status: DISCONTINUED | OUTPATIENT
Start: 2024-07-15 | End: 2024-07-30 | Stop reason: HOSPADM

## 2024-07-15 RX ORDER — PHENYLEPHRINE HCL IN 0.9% NACL 1 MG/10 ML
SYRINGE (ML) INTRAVENOUS
Status: DISPENSED
Start: 2024-07-15 | End: 2024-07-15

## 2024-07-15 RX ORDER — LEVETIRACETAM 5 MG/ML
500 INJECTION INTRAVASCULAR EVERY 12 HOURS
Status: DISCONTINUED | OUTPATIENT
Start: 2024-07-15 | End: 2024-07-24

## 2024-07-15 RX ORDER — ATORVASTATIN CALCIUM 40 MG/1
40 TABLET, FILM COATED ORAL NIGHTLY
Status: DISCONTINUED | OUTPATIENT
Start: 2024-07-15 | End: 2024-07-30 | Stop reason: HOSPADM

## 2024-07-15 RX ORDER — DEXTROSE 50 % IN WATER (D50W) INTRAVENOUS SYRINGE
25
Status: DISCONTINUED | OUTPATIENT
Start: 2024-07-15 | End: 2024-07-30 | Stop reason: HOSPADM

## 2024-07-15 RX ORDER — LINEZOLID 2 MG/ML
600 INJECTION, SOLUTION INTRAVENOUS ONCE
Status: DISCONTINUED | OUTPATIENT
Start: 2024-07-15 | End: 2024-07-15

## 2024-07-15 RX ORDER — MAGNESIUM SULFATE HEPTAHYDRATE 40 MG/ML
INJECTION, SOLUTION INTRAVENOUS
Status: COMPLETED
Start: 2024-07-15 | End: 2024-07-15

## 2024-07-15 RX ORDER — IPRATROPIUM BROMIDE AND ALBUTEROL SULFATE 2.5; .5 MG/3ML; MG/3ML
3 SOLUTION RESPIRATORY (INHALATION) EVERY 4 HOURS PRN
Status: DISCONTINUED | OUTPATIENT
Start: 2024-07-15 | End: 2024-07-30 | Stop reason: HOSPADM

## 2024-07-15 RX ORDER — RISPERIDONE 0.5 MG/1
1 TABLET ORAL 2 TIMES DAILY
Status: DISCONTINUED | OUTPATIENT
Start: 2024-07-15 | End: 2024-07-15 | Stop reason: SDUPTHER

## 2024-07-15 RX ORDER — FORMOTEROL FUMARATE DIHYDRATE 20 UG/2ML
20 SOLUTION RESPIRATORY (INHALATION)
Status: DISCONTINUED | OUTPATIENT
Start: 2024-07-15 | End: 2024-07-30 | Stop reason: HOSPADM

## 2024-07-15 RX ORDER — DEXTROSE 50 % IN WATER (D50W) INTRAVENOUS SYRINGE
Status: COMPLETED
Start: 2024-07-15 | End: 2024-07-15

## 2024-07-15 RX ORDER — PANTOPRAZOLE SODIUM 40 MG/10ML
40 INJECTION, POWDER, LYOPHILIZED, FOR SOLUTION INTRAVENOUS DAILY
Status: DISCONTINUED | OUTPATIENT
Start: 2024-07-16 | End: 2024-07-17

## 2024-07-15 RX ORDER — IPRATROPIUM BROMIDE AND ALBUTEROL SULFATE 2.5; .5 MG/3ML; MG/3ML
3 SOLUTION RESPIRATORY (INHALATION) 3 TIMES DAILY
Status: DISCONTINUED | OUTPATIENT
Start: 2024-07-16 | End: 2024-07-30 | Stop reason: HOSPADM

## 2024-07-15 RX ORDER — ACETAMINOPHEN 325 MG/1
650 TABLET ORAL EVERY 4 HOURS PRN
Status: DISCONTINUED | OUTPATIENT
Start: 2024-07-15 | End: 2024-07-16

## 2024-07-15 SDOH — SOCIAL STABILITY: SOCIAL INSECURITY: WITHIN THE LAST YEAR, HAVE YOU BEEN AFRAID OF YOUR PARTNER OR EX-PARTNER?: PATIENT UNABLE TO ANSWER

## 2024-07-15 SDOH — SOCIAL STABILITY: SOCIAL INSECURITY: DOES ANYONE TRY TO KEEP YOU FROM HAVING/CONTACTING OTHER FRIENDS OR DOING THINGS OUTSIDE YOUR HOME?: NO

## 2024-07-15 SDOH — SOCIAL STABILITY: SOCIAL INSECURITY: WERE YOU ABLE TO COMPLETE ALL THE BEHAVIORAL HEALTH SCREENINGS?: YES

## 2024-07-15 SDOH — SOCIAL STABILITY: SOCIAL NETWORK: ARE YOU MARRIED, WIDOWED, DIVORCED, SEPARATED, NEVER MARRIED, OR LIVING WITH A PARTNER?: PATIENT UNABLE TO ANSWER

## 2024-07-15 SDOH — SOCIAL STABILITY: SOCIAL NETWORK: IN A TYPICAL WEEK, HOW MANY TIMES DO YOU TALK ON THE PHONE WITH FAMILY, FRIENDS, OR NEIGHBORS?: PATIENT UNABLE TO ANSWER

## 2024-07-15 SDOH — SOCIAL STABILITY: SOCIAL NETWORK: HOW OFTEN DO YOU GET TOGETHER WITH FRIENDS OR RELATIVES?: PATIENT UNABLE TO ANSWER

## 2024-07-15 SDOH — SOCIAL STABILITY: SOCIAL INSECURITY: ARE YOU OR HAVE YOU BEEN THREATENED OR ABUSED PHYSICALLY, EMOTIONALLY, OR SEXUALLY BY ANYONE?: NO

## 2024-07-15 SDOH — SOCIAL STABILITY: SOCIAL INSECURITY: HAVE YOU HAD ANY THOUGHTS OF HARMING ANYONE ELSE?: NO

## 2024-07-15 SDOH — HEALTH STABILITY: PHYSICAL HEALTH: ON AVERAGE, HOW MANY DAYS PER WEEK DO YOU ENGAGE IN MODERATE TO STRENUOUS EXERCISE (LIKE A BRISK WALK)?: 0 DAYS

## 2024-07-15 SDOH — SOCIAL STABILITY: SOCIAL INSECURITY: ARE THERE ANY APPARENT SIGNS OF INJURIES/BEHAVIORS THAT COULD BE RELATED TO ABUSE/NEGLECT?: NO

## 2024-07-15 SDOH — SOCIAL STABILITY: SOCIAL INSECURITY: HAS ANYONE EVER THREATENED TO HURT YOUR FAMILY OR YOUR PETS?: NO

## 2024-07-15 SDOH — SOCIAL STABILITY: SOCIAL INSECURITY: ABUSE: ADULT

## 2024-07-15 SDOH — SOCIAL STABILITY: SOCIAL INSECURITY: DO YOU FEEL UNSAFE GOING BACK TO THE PLACE WHERE YOU ARE LIVING?: NO

## 2024-07-15 SDOH — SOCIAL STABILITY: SOCIAL NETWORK: HOW OFTEN DO YOU ATTENT MEETINGS OF THE CLUB OR ORGANIZATION YOU BELONG TO?: PATIENT UNABLE TO ANSWER

## 2024-07-15 SDOH — SOCIAL STABILITY: SOCIAL INSECURITY: DO YOU FEEL ANYONE HAS EXPLOITED OR TAKEN ADVANTAGE OF YOU FINANCIALLY OR OF YOUR PERSONAL PROPERTY?: NO

## 2024-07-15 SDOH — SOCIAL STABILITY: SOCIAL NETWORK: HOW OFTEN DO YOU ATTEND CHURCH OR RELIGIOUS SERVICES?: PATIENT UNABLE TO ANSWER

## 2024-07-15 SDOH — SOCIAL STABILITY: SOCIAL INSECURITY: HAVE YOU HAD THOUGHTS OF HARMING ANYONE ELSE?: UNABLE TO ASSESS

## 2024-07-15 ASSESSMENT — COGNITIVE AND FUNCTIONAL STATUS - GENERAL
PERSONAL GROOMING: TOTAL
DRESSING REGULAR UPPER BODY CLOTHING: TOTAL
TOILETING: TOTAL
TOILETING: TOTAL
MOVING TO AND FROM BED TO CHAIR: TOTAL
DRESSING REGULAR LOWER BODY CLOTHING: TOTAL
DRESSING REGULAR LOWER BODY CLOTHING: TOTAL
DAILY ACTIVITIY SCORE: 6
STANDING UP FROM CHAIR USING ARMS: TOTAL
DAILY ACTIVITIY SCORE: 6
TURNING FROM BACK TO SIDE WHILE IN FLAT BAD: TOTAL
WALKING IN HOSPITAL ROOM: TOTAL
STANDING UP FROM CHAIR USING ARMS: TOTAL
WALKING IN HOSPITAL ROOM: TOTAL
MOBILITY SCORE: 6
EATING MEALS: TOTAL
CLIMB 3 TO 5 STEPS WITH RAILING: TOTAL
MOBILITY SCORE: 6
PERSONAL GROOMING: TOTAL
PATIENT BASELINE BEDBOUND: NO
CLIMB 3 TO 5 STEPS WITH RAILING: TOTAL
MOVING TO AND FROM BED TO CHAIR: TOTAL
EATING MEALS: TOTAL
MOVING FROM LYING ON BACK TO SITTING ON SIDE OF FLAT BED WITH BEDRAILS: TOTAL
MOVING FROM LYING ON BACK TO SITTING ON SIDE OF FLAT BED WITH BEDRAILS: TOTAL
TURNING FROM BACK TO SIDE WHILE IN FLAT BAD: TOTAL
HELP NEEDED FOR BATHING: TOTAL
DRESSING REGULAR UPPER BODY CLOTHING: TOTAL
HELP NEEDED FOR BATHING: TOTAL

## 2024-07-15 ASSESSMENT — ACTIVITIES OF DAILY LIVING (ADL)
JUDGMENT_ADEQUATE_SAFELY_COMPLETE_DAILY_ACTIVITIES: NO
HEARING - RIGHT EAR: FUNCTIONAL
PATIENT'S MEMORY ADEQUATE TO SAFELY COMPLETE DAILY ACTIVITIES?: NO
TOILETING: DEPENDENT
DRESSING YOURSELF: DEPENDENT
BATHING: DEPENDENT
LACK_OF_TRANSPORTATION: PATIENT UNABLE TO ANSWER
WALKS IN HOME: DEPENDENT
GROOMING: DEPENDENT
FEEDING YOURSELF: NEEDS ASSISTANCE
HEARING - LEFT EAR: FUNCTIONAL
ASSISTIVE_DEVICE: EYEGLASSES
ADEQUATE_TO_COMPLETE_ADL: YES

## 2024-07-15 ASSESSMENT — LIFESTYLE VARIABLES
EVER HAD A DRINK FIRST THING IN THE MORNING TO STEADY YOUR NERVES TO GET RID OF A HANGOVER: NO
EVER FELT BAD OR GUILTY ABOUT YOUR DRINKING: NO
HAVE PEOPLE ANNOYED YOU BY CRITICIZING YOUR DRINKING: NO
HOW OFTEN DO YOU HAVE 6 OR MORE DRINKS ON ONE OCCASION: PATIENT UNABLE TO ANSWER
AUDIT-C TOTAL SCORE: -1
SKIP TO QUESTIONS 9-10: 0
HAVE YOU EVER FELT YOU SHOULD CUT DOWN ON YOUR DRINKING: NO
HOW MANY STANDARD DRINKS CONTAINING ALCOHOL DO YOU HAVE ON A TYPICAL DAY: PATIENT UNABLE TO ANSWER
HOW OFTEN DO YOU HAVE A DRINK CONTAINING ALCOHOL: PATIENT UNABLE TO ANSWER
TOTAL SCORE: 0
AUDIT-C TOTAL SCORE: -1

## 2024-07-15 ASSESSMENT — PAIN - FUNCTIONAL ASSESSMENT
PAIN_FUNCTIONAL_ASSESSMENT: CPOT (CRITICAL CARE PAIN OBSERVATION TOOL)
PAIN_FUNCTIONAL_ASSESSMENT: 0-10

## 2024-07-15 ASSESSMENT — COLUMBIA-SUICIDE SEVERITY RATING SCALE - C-SSRS
1. IN THE PAST MONTH, HAVE YOU WISHED YOU WERE DEAD OR WISHED YOU COULD GO TO SLEEP AND NOT WAKE UP?: NO
2. HAVE YOU ACTUALLY HAD ANY THOUGHTS OF KILLING YOURSELF?: NO
2. HAVE YOU ACTUALLY HAD ANY THOUGHTS OF KILLING YOURSELF?: NO
6. HAVE YOU EVER DONE ANYTHING, STARTED TO DO ANYTHING, OR PREPARED TO DO ANYTHING TO END YOUR LIFE?: NO
1. IN THE PAST MONTH, HAVE YOU WISHED YOU WERE DEAD OR WISHED YOU COULD GO TO SLEEP AND NOT WAKE UP?: NO
6. HAVE YOU EVER DONE ANYTHING, STARTED TO DO ANYTHING, OR PREPARED TO DO ANYTHING TO END YOUR LIFE?: NO

## 2024-07-15 ASSESSMENT — PAIN SCALES - GENERAL
PAINLEVEL_OUTOF10: 0 - NO PAIN

## 2024-07-15 NOTE — H&P
Critical Care Medicine History and Physical        Subjective   Patient is a 63 y.o. female admitted on 7/15/2024 10:13 AM to the ICU for shock suspected secondary to adrenal insufficiency.    Bing Argueta is a 63-year-old female with systemic lupus erythematosus, lupus cerebritis, rheumatoid arthritis, adrenal cortical insufficiency, COPD, diabetes, hypertension, paroxysmal atrial fibrillation (no longer Eliquis due to thrombocytopenia), hyperlipidemia, CKD, Raynaud's syndrome, hyperparathyroidism, pacemaker, seizures, psychosis who presented to the emergency department for altered mental status.  In the emergency department, she was found to be hypothermic at 32 °C, hypotensive at 70/45, hypoglycemic at 49.  She has history of adrenocortical insufficiency dependent on hydrocortisone and Florinef, so patient was given Solu-Cortef 100 mg IV, as well as dextrose.  She was given IV fluids but remained hypotensive and was placed on norepinephrine.  The ED team spoke with endocrinology, who agreed with the plan for Solu-Cortef.  She was started on Zosyn empirically for possible septic shock, but was also given a dose of linezolid after it was discovered that a prior urine culture was positive for Enterococcus faecium sensitive to linezolid.  After discussion with the ICU team, patient will receive CT of her chest, abdomen, pelvis prior to transfer to the ICU.    On arrival to the ICU, patient is awake but disoriented.  She mumbles when asked her name and is not coherent.  We spoke with the patient's son, López.  Per López, her mentation does wax and wane, but on her best days, she is oriented and conversant.  We discussed CODE STATUS as well as goals of care.  Patient is DNR CCA, DNI.  However, she is requiring vasopressors at this time and he consents to have invasive lines placed if necessary.  Patient was recently hospitalized for adrenal insufficiency and required ICU care for vasopressors.  Given the frequent  hospitalizations, he is open to speaking with palliative care as they may wish to prioritize comfort over repeated hospitalizations.      ED Course  Presentation: T 32.6 °C, HR 82, RR 18, BP 70/45, O2 90% room air.  Labs: Glucose 49, Na 144, K 4.4, Cl 113, Bicarb 22, BUN 28, creatinine 2.41.  WBC 2.4, hemoglobin 8.4, hematocrit 28.1, platelets 68.  TSH 4.67, free T4 normal 0.95.  INR 1.0.  VBG: pH 7.25, pCO2 55, pO2 33, bicarb 24.1, lactate 2.3.  Imaging: CT of the chest, abdomen, pelvis significant for diverticulitis of the hepatic flexure.  Small right pleural effusion, trace ascites.  Inflammatory nodules in the right middle and left lower lobe.  Interventions: Dextrose 25 g, Solu-Cortef 100 mg, Zosyn, linezolid, phenylephrine 200 mcg, 1 L IV normal saline, norepinephrine infusion        Past Medical History:   Diagnosis Date    Abnormal kidney function 04/04/2023    Acute headache 03/10/2024    Acute iritis of right eye 07/24/2015    Acute low back pain 10/30/2023    Acute upper respiratory infection, unspecified 03/04/2020    Acute URI    Acute upper respiratory infection, unspecified 09/30/2015    URTI (acute upper respiratory infection)    Arthralgia of right knee 02/14/2024    Arthritis     Atypical facial pain 03/10/2024    Body mass index (BMI) 23.0-23.9, adult 10/15/2021    BMI 23.0-23.9, adult    Body mass index (BMI) 33.0-33.9, adult 03/04/2020    BMI 33.0-33.9,adult    Cardiomegaly 08/27/2013    Left ventricular hypertrophy    Chest pain 06/10/2022    Comment on above: Added by Problem List Migration; 2013-3-12; Moved to Suppressed Nov 25 2013 9:16PM; Comment on above: Added by Problem List Migration; 2013-3-12; Moved to Suppressed Nov 25 2013 9:16PM;    Chronic kidney disease, stage 3 unspecified (Multi) 07/02/2013    Chronic kidney disease, stage III (moderate)    Confusional state 03/10/2024    Contact with and (suspected) exposure to covid-19 04/04/2023    Delirium 03/10/2024    Disease of  pericardium, unspecified ([facility]) 07/02/2013    Pericardial disease    Encounter for follow-up examination after completed treatment for conditions other than malignant neoplasm 10/06/2022    Hospital discharge follow-up    Generalized contraction of visual field, right eye 01/29/2015    Generalized contraction of visual field of right eye    Hearing loss 03/10/2024    History of cataract 03/10/2024    History of thrombocytopenia 03/10/2024    Comment on above: Added by Problem List Migration; 2013-7-2;    Homonymous bilateral field defects, right side 04/29/2016    Homonymous bilateral field defects of right side    Hypertensive chronic kidney disease with stage 1 through stage 4 chronic kidney disease, or unspecified chronic kidney disease 07/02/2013    Nephrosclerosis    Laceration without foreign body, left foot, initial encounter 07/03/2018    Foot laceration, left, initial encounter    Localized edema 03/10/2024    Localized, primary osteoarthritis 02/14/2024    Mass of skin 03/10/2024    Migraine with aura, not intractable, without status migrainosus 10/24/2022    Ocular migraine    Open wound 03/10/2024    Open wound of left foot 03/10/2024    Other conditions influencing health status 07/02/2013    Chronic Glomerulonephritis In Diseases Classified Elsewhere    Other conditions influencing health status 07/02/2013    Progressive Familial Myoclonic Epilepsy    Other conditions influencing health status 07/02/2013    Protein S Deficiency    Other conditions influencing health status 05/22/2015    Familial Combined Hyperlipidemia    Other conditions influencing health status 10/24/2022    IDDM (insulin dependent diabetes mellitus)    Other conditions influencing health status 03/14/2022    Diabetes mellitus, insulin dependent (IDDM), uncontrolled    Other long term (current) drug therapy 10/24/2022    Long-term use of Plaquenil    Overweight with body mass index (BMI) 25.0-29.9 03/10/2024    Pain of toe  03/10/2024    Personal history of COVID-19 04/04/2023    Personal history of diseases of the blood and blood-forming organs and certain disorders involving the immune mechanism 07/02/2013    History of thrombocytopenia    Personal history of diseases of the skin and subcutaneous tissue 08/11/2015    History of foot ulcer    Personal history of nephrotic syndrome 07/02/2013    History of nephrotic syndrome    Personal history of other diseases of the circulatory system 08/27/2013    History of sinus tachycardia    Personal history of other diseases of the nervous system and sense organs     History of cataract    Personal history of other diseases of the respiratory system     History of bronchitis    Personal history of other infectious and parasitic diseases 07/02/2013    History of hepatitis    Personal history of other specified conditions     History of shortness of breath    Personal history of other specified conditions 08/27/2013    History of edema    Posterior epistaxis 03/10/2024    Puckering of macula, right eye 10/24/2022    ERM OD (epiretinal membrane, right eye)    Raynaud's syndrome without gangrene 07/02/2013    Raynaud's disease    Sinusitis 03/10/2024    Systemic lupus erythematosus, unspecified (Multi) 07/24/2015    SLE (systemic lupus erythematosus)    Systemic lupus erythematosus, unspecified (Multi) 07/24/2015    SLE (systemic lupus erythematosus)    Systemic lupus erythematosus, unspecified (Multi) 07/24/2015    Systemic lupus    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) 07/02/2013    Type 2 diabetes with nephropathy    Type 2 diabetes mellitus with mild nonproliferative diabetic retinopathy without macular edema, left eye (Multi) 07/27/2015    Non-proliferative diabetic retinopathy, left eye    Type 2 diabetes mellitus with mild nonproliferative diabetic retinopathy without macular edema, unspecified eye (Multi) 07/24/2015    Mild non proliferative diabetic retinopathy     Type 2 diabetes mellitus with proliferative diabetic retinopathy without macular edema, right eye (Multi) 07/27/2015    Proliferative diabetic retinopathy of right eye    Type 2 diabetes mellitus with proliferative diabetic retinopathy without macular edema, unspecified eye (Multi) 07/24/2015    Diabetic proliferative retinopathy    Unspecified acute and subacute iridocyclitis 07/24/2015    Acute iritis, right eye    Unspecified open wound, left foot, sequela 07/03/2018    Wound, open, foot, left, sequela     Past Surgical History:   Procedure Laterality Date    ANKLE SURGERY  01/29/2015    Ankle Surgery    CARDIAC ELECTROPHYSIOLOGY PROCEDURE Left 5/17/2024    Procedure: PPM IMPLANT DUAL;  Surgeon: Antonio Rodriges MD;  Location: [facility];  Service: Electrophysiology;  Laterality: Left;  Pt. needs platelets and Prednisone prior to procedure    CHOLECYSTECTOMY  01/29/2015    Cholecystectomy    CT GUIDED PERCUTANEOUS BIOPSY BONE DEEP  5/4/2021    CT GUIDED PERCUTANEOUS BIOPSY BONE DEEP 5/4/2021 [facility]ICAL [facility]GAC[facility]    EYE SURGERY  03/06/2015    Eye Surgery    FOOT SURGERY  01/29/2015    Foot Surgery    MR HEAD ANGIO WO IV CONTRAST  7/26/2013    MR HEAD ANGIO WO IV CONTRAST 7/26/2013 [facility] CLINICAL LEGACY    MR HEAD ANGIO WO IV CONTRAST  9/17/2021    MR HEAD ANGIO WO IV CONTRAST 9/17/2021 [facility]    MR HEAD ANGIO WO IV CONTRAST  3/25/2023    MR HEAD ANGIO WO IV CONTRAST [facility]    MR NECK ANGIO WO IV CONTRAST  7/26/2013    MR NECK ANGIO WO IV CONTRAST 7/26/2013 [facility] CLINICAL LEGAC[facility]    MR NECK ANGIO WO IV CONTRAST  9/17/2021    MR NECK ANGIO WO IV CONTRAST 9/17/2021 [facility]    MR NECK ANGIO WO IV CONTRAST  3/25/2023    MR NECK ANGIO WO IV CONTRAST [facility]    OTHER SURGICAL HISTORY  01/29/2015    Creation Of Pericardial Window    OTHER SURGICAL HISTORY  01/29/2015    Quadricepsplasty    TOTAL HIP ARTHROPLASTY  01/29/2015    Hip Replacement     Medications Prior to Admission    Medication Sig Dispense Refill Last Dose    acetaminophen (Tylenol) 325 mg tablet Take 2 tablets (650 mg) by mouth every 4 hours if needed for mild pain (1 - 3), moderate pain (4 - 6) or fever (temp greater than 38.0 C).   Past Week    albuterol-budesonide (Airsupra) 90-80 mcg/actuation inhaler Inhale 2 puffs every 6 hours if needed (sob/wheezing).   Past Week    atorvastatin (Lipitor) 40 mg tablet Take 1 tablet (40 mg) by mouth once daily. (Patient taking differently: Take 1 tablet (40 mg) by mouth once daily at bedtime.) 90 tablet 3 7/14/2024 at PM    balsam peru-castor oiL (Venelex) ointment Apply 1 Application topically 2 times a day. To buttocks, sacrum, and thighs   7/14/2024 at PM    blood-glucose sensor (Dexcom G6 Sensor) device Use to check sugars 3 times daily 4 each 2 7/14/2024    Dexcom G4 platinum  (Dexcom G6 ) misc Use as instructed 1 each 0 7/14/2024    Dexcom G4 platinum transmitter (Dexcom G6 Transmitter) device Use as instructed 1 each 0 7/14/2024    fludrocortisone (Florinef) 0.1 mg tablet Take 1 tablet (0.1 mg) by mouth every other day.   7/14/2024 at AM    fluticasone-umeclidin-vilanter (TRELEGY-ELLIPTA) 200-62.5-25 mcg blister with device Inhale 1 puff once daily. 60 each 5 7/14/2024 at AM    folic acid (Folvite) 1 mg tablet TAKE 1 TABLET BY MOUTH EVERY DAY 90 tablet 1 7/14/2024 at AM    furosemide (Lasix) 40 mg tablet Take 1 tablet (40 mg) by mouth once daily.   7/14/2024 at AM    glucagon HCL (Glucagon, HCl, Emergency Kit) 1 mg recon soln Inject 1 mg into the muscle if needed.   Past Month    guaiFENesin (Mucinex) 600 mg 12 hr tablet Take 2 tablets (1,200 mg) by mouth 2 times a day. Do not crush, chew, or split.   7/14/2024 at PM    hydrocortisone (Cortef) 10 mg tablet Take 1 tablet (10 mg) by mouth once daily. In the afternoon   7/14/2024 at PM    hydrocortisone (Cortef) 20 mg tablet Take 1 tablet (20 mg) by mouth once daily in the morning.   7/14/2024 at AM    insulin  "glargine (Lantus U-100 Insulin) 100 unit/mL injection Inject 10 Units under the skin once daily in the morning. Take as directed per insulin instructions.   7/14/2024 at AM    insulin lispro (HumaLOG) 100 unit/mL injection Inject 0-0.1 mL (0-10 Units) under the skin 3 times a day as needed for high blood sugar (before meals). Take as directed per insulin Sliding Scale instructions.  0-150 = 0 units  151-200 = 2 units  201-250 = 4 units  251-300 = 6 units  301-350 = 8 units  351-400 = 10 units  >400 = give 10 units and contact MD   7/14/2024 at PM    levETIRAcetam (Keppra) 500 mg tablet Take 1 tablet (500 mg) by mouth every 12 hours. 60 tablet 0 7/14/2024 at PM    meclizine (Antivert) 25 mg tablet Take 1 tablet (25 mg) by mouth every 12 hours if needed for dizziness.   Past Week    melatonin 5 mg tablet Take 1 tablet (5 mg) by mouth once daily at bedtime.   7/14/2024 at PM    metoprolol tartrate (Lopressor) 25 mg tablet Take 1 tablet (25 mg) by mouth 2 times a day.   7/14/2024 at PM    multivitamin with minerals tablet Take 1 tablet by mouth once daily.   7/14/2024 at AM    mycophenolate (Cellcept) 500 mg tablet TAKE 2 TABLETS BY MOUTH TWICE A  tablet 0 7/14/2024 at PM    paliperidone (Invega) 6 mg 24 hr tablet Take 1 tablet (6 mg) by mouth once daily. Do not crush, chew, or split. (Patient taking differently: Take 1 tablet (6 mg) by mouth once daily. Total of 15mg)   7/14/2024 at AM    paliperidone (Invega) 9 mg 24 hr tablet Take 1 tablet (9 mg) by mouth once daily in the morning. Total of 15mg   7/14/2024 at AM    pantoprazole (ProtoNix) 40 mg EC tablet TAKE 1 TABLET BY MOUTH EVERY DAY 90 tablet 1 7/14/2024 at AM    pen needle, diabetic 31 gauge x 5/16\" needle Use to inject 1-4 times daily as directed. 100 each 11 7/14/2024    potassium chloride CR 20 mEq ER tablet Take 1 tablet (20 mEq) by mouth once daily at bedtime.   7/14/2024 at PM    sertraline (Zoloft) 25 mg tablet Take 1 tablet (25 mg) by mouth once " daily at bedtime.   7/14/2024 at PM    thiamine 100 mg tablet Take 1 tablet (100 mg) by mouth once daily.   7/14/2024 at AM     Ace inhibitors, Hydroxychloroquine, Lisinopril, and Sulfa (sulfonamide antibiotics)  Social History     Tobacco Use    Smoking status: Never     Passive exposure: Never    Smokeless tobacco: Never   Vaping Use    Vaping status: Never Used   Substance Use Topics    Alcohol use: Not Currently     Comment: RARE    Drug use: Never     Family History   Problem Relation Name Age of Onset    Other (RENAL DISEASE) Mother          END STAGE    Other (CARDIAC DISORDER) Mother      Cataracts Mother      Stroke Mother      Diabetes Mother      Kidney disease Mother      Lupus Mother      Other (CARDIAC DISORDER) Father      COPD Father      Glaucoma Father      Hypertension Father      Sleep apnea Father      Other (CARDIAC DISORDER) Sister      Depression Sister      Kidney disease Sister      Sickle cell trait Sister      Sleep apnea Daughter         Scheduled Medications:   linezolid, 600 mg, intravenous, Once  magnesium sulfate, 2 g, intravenous, Once  phenylephrine in NS, , ,   sodium chloride, 30 mL/kg, intravenous, Once         Continuous Medications:   norepinephrine, 0-0.2 mcg/kg/min, Last Rate: 0.02 mcg/kg/min (07/15/24 1135)         PRN Medications:   PRN medications: alteplase, phenylephrine in NS    Objective   Vitals:  24hr Min/Max:  Temp  Min: 32.6 °C (90.7 °F)  Max: 36.1 °C (97 °F)  Pulse  Min: 64  Max: 104  BP  Min: 40/20  Max: 140/74  Resp  Min: 18  Max: 29  SpO2  Min: 77 %  Max: 100 %    Physical exam:    Physical Exam  Vitals and nursing note reviewed.   Constitutional:       Appearance: She is well-developed. She is ill-appearing.      Comments: Awake, but confused.   HENT:      Head: Normocephalic and atraumatic.      Right Ear: External ear normal.      Left Ear: External ear normal.      Nose: Nose normal.      Mouth/Throat:      Mouth: Mucous membranes are dry.   Eyes:       General: No scleral icterus.     Conjunctiva/sclera: Conjunctivae normal.      Pupils: Pupils are equal, round, and reactive to light.   Cardiovascular:      Rate and Rhythm: Tachycardia present. Rhythm irregular.      Heart sounds: No murmur heard.  Pulmonary:      Effort: Pulmonary effort is normal. No respiratory distress.      Breath sounds: Normal breath sounds.   Abdominal:      Palpations: Abdomen is soft.      Tenderness: There is no abdominal tenderness.   Musculoskeletal:         General: No swelling.      Cervical back: Neck supple.   Skin:     General: Skin is warm and dry.      Comments: Dressings on her lower extremities bilaterally.   Neurological:      Mental Status: She is disoriented.      Comments: Awake but disoriented and mumbles.  Response to noxious stimuli in all 4 extremities.         Assessment/Plan   Assessment/Plan   Overall assessment:  Patient is a 63-year-old female admitted to the ICU for shock, suspect secondary to adrenal insufficiency but sepsis from diverticulitis also within the differential    Neuro: Encephalopathy secondary to hypoperfusion, sepsis  - History: Lupus cerebritis  - Home meds: Continue home Keppra.  Holding home sertraline, risperidone until mentation improves  - [facility] ICU    Cardiovascular: Shock secondary to adrenal insufficiency and/or sepsis  - History: Atrial fibrillation no longer on anticoagulation, pacemaker, hypertension, hyperlipidemia  - Goals: MAP> 65  - Home meds: Resume home atorvastatin  - Last echo: LVEF 40 to 45% (5/2024)  -Norepinephrine drip    Pulmonary:      - History: COPD  - Home meds: Continue DuoNeb and Pulmicort as needed for wheezing  Continuous pulse oximetry   O2 PRN to maintain SpO2 > 94%, wean as tolerated  - Imaging: CT chest shows scattered inflammatory pulmonary nodules.  No consolidation.    Gastrointestinal: Diverticulitis at the hepatic flexure  Results from last 7 days   Lab Units 07/15/24  1114   INR  1.0   ALK PHOS U/L 134    AST U/L 34   ALT U/L 27   LIPASE U/L 21       - Diet: NPO for now given diverticulitis  - GI on consult for diverticulitis  - Prophylaxis: Continue home Protonix  - Bowel regimen: None  - Last BM: Last BM Date: 07/15/24    Renal: Acute kidney injury on chronic kidney disease, suspect secondary to hypoperfusion/hypovolemia  Results from last 7 days   Lab Units 07/15/24  1114 07/09/24  0547 07/08/24  1633   SODIUM mmol/L 144 146* 145   POTASSIUM mmol/L 4.4 4.7 3.7   MAGNESIUM mg/dL 1.70  --  1.62   PHOSPHORUS mg/dL  --  3.5 3.3   BUN mg/dL 28* 23 24*   CREATININE mg/dL 2.41* 1.39* 1.44*       Net IO Since Admission: -125 mL [07/15/24 1555]  - Daily CMP,Mg,Phos  - History: CKD, baseline creatinine 1.4  - [place] with strict I/O   -Clinically appears hypovolemic, hydrate as necessary  - Electrolytes: Replete per protocol, goal K>4 Phos >3 Mg >2    Endocrine: Adrenal cortical insufficiency  Results from last 7 days   Lab Units 07/15/24  1350 07/15/24  1134 07/15/24  1114 07/15/24  1039 07/09/24  1553 07/09/24  1231 07/09/24  0822 07/09/24  0547   POCT GLUCOSE mg/dL 102* 119*  --  49* 180* 176* 81  --    GLUCOSE mg/dL  --   --  49*  --   --   --   --  83   TSH mIU/L  --   --  4.67*  --   --   --   --   --       -History: Adrenocortical insufficiency, diabetes  -Home meds: Holding Florinef and hydrocortisone, administering Solu-Cortef 100 mg every 8 hours instead  -Endocrinology on consult, agreed with Solu-Cortef in the ED  -sliding scale: Insulin lispro  -BG < 180 goal    Hematology:  Results from last 7 days   Lab Units 07/15/24  1114 07/09/24  0547 07/08/24  1742   HEMOGLOBIN g/dL 8.4* 9.0* 9.0*   HEMATOCRIT % 28.1* 31.1* 30.2*   PLATELETS AUTO x10*3/uL 68* 85* 89*     - Daily CBC  -History: Chronic thrombocytopenia  - DVT Prophylaxis: Heparin, in setting of EJD on CKD    Infectious Disease: Septic shock? Diverticulitis  Results from last 7 days   Lab Units 07/15/24  1114 07/09/24  0547 07/08/24  1742   WBC AUTO  x10*3/uL 2.4* 4.5 6.8     Temp (24hrs), Av.4 °C (94 °F), Min:32.6 °C (90.7 °F), Max:36.1 °C (97 °F)     -Prior urine culture positive for E. Faecium sensitive to linezolid, continue until cultures result  -Cultures pending  -Cultures:  -Zosyn for diverticulitis    Musculoskeletal:   - PT to see   - OT to see       LDA:  CVC 07/15/24 Triple lumen Left Internal jugular (Active)   Placement Date/Time: 07/15/24 1500   Hand Hygiene Performed Prior to CVC Insertion: Yes  Site Prep: Chlorhexidine   Site Prep Agent has Completely Dried Before Insertion: Yes  All 5 Sterile Barriers Used (Gloves, Gown, Cap, Mask, Large Sterile Drape):...   Number of days: 0       Urethral Catheter (Active)   Placement Date/Time: 07/15/24 1300     Number of days: 0         CODE STATUS: DNR and No Intubation    Disposition: Patient remain ICU for continuing management of shock.    RESTRAINTS: type: None    ABCDEF Checklist  Analgesia: Spontaneous awakening trial to be pursued if clinically appropriate. RASS goal reviewed  Breathing: Spontaneous breathing trial to be pursued if clinically appropriate. Mechanical power of assisted ventilation reviewed  Choice of analgesia/sedation: Analgesic and sedative agents adjusted per clinical context.   Delirium assessed by CAM, will avoid exacerbating factors  Early mobility and exercise: Physical and occupational therapy engaged  Family: Plan of care, overall trajectory of patient shared with family. Questions elicited and answered as appropriate.     Due to the high probability of life threatening clinical decompensation, the patient required critical care time evaluating and managing this patient.  Critical care time included obtaining a history, examining the patient, ordering and reviewing studies, discussing, developing, and implementing a management plan, evaluating the patient's response to treatment, and discussion with other care team providers. I saw and evaluated the patient myself.   Critical care time was performed exclusive of billable procedures.    Critical care time: 65 minutes      Case discussed with attending , Dr. Lambert.  Jaziel Tyler DO

## 2024-07-15 NOTE — CONSULTS
Reason for consult: Diverticulitis.      History of Present Illness:   Bing Holliday is a 63 y.o. female with a PMH of SLE, lupus cerebritis, RA, Raynaud's syndrome, hyperparathyroidism, adrenal insufficiency, COPD, DM, HTN, HLD, atrial fibrillation (not on anticoagulation), CKD, pacemaker, seizures, psychosis who presented with concerns for shock 2/2 adrenal insufficiency + UA (prior positive urine culture) and in whom we are consulted for diverticulitis.  Patient is altered/lethargic and unable to participate in review of systems. Granddaughter is at bedside. [facility] patient has had multiple bouts of diverticulitis in the past. [facility] patient has been living in a nursing home and health has been declining significantly over the last couple years. Unable to participate in ADLs. Confused a lot of the time.     CT C/A/P without contrast: Small right effusion, small ascites, diverticulitis.  Colonoscopy 2018: 3 polyps (largest 15 mm), diverticulosis, hemorrhoids    Review of Systems  ROS Negative unless otherwise stated above.    Past Medical History   has a past medical history of Abnormal kidney function (04/04/2023), Acute headache (03/10/2024), Acute iritis of right eye (07/24/2015), Acute low back pain (10/30/2023), Acute upper respiratory infection, unspecified (03/04/2020), Acute upper respiratory infection, unspecified (09/30/2015), Arthralgia of right knee (02/14/2024), Arthritis, Atypical facial pain (03/10/2024), Body mass index (BMI) 23.0-23.9, adult (10/15/2021), Body mass index (BMI) 33.0-33.9, adult (03/04/2020), Cardiomegaly (08/27/2013), Chest pain (06/10/2022), Chronic kidney disease, stage 3 unspecified (Multi) (07/02/2013), Confusional state (03/10/2024), Contact with and (suspected) exposure to covid-19 (04/04/2023), Delirium (03/10/2024), Disease of pericardium, unspecified ([facility]) (07/02/2013), Encounter for follow-up examination after completed treatment for conditions other than malignant  neoplasm (10/06/2022), Generalized contraction of visual field, right eye (01/29/2015), Hearing loss (03/10/2024), History of cataract (03/10/2024), History of thrombocytopenia (03/10/2024), Homonymous bilateral field defects, right side (04/29/2016), Hypertensive chronic kidney disease with stage 1 through stage 4 chronic kidney disease, or unspecified chronic kidney disease (07/02/2013), Laceration without foreign body, left foot, initial encounter (07/03/2018), Localized edema (03/10/2024), Localized, primary osteoarthritis (02/14/2024), Mass of skin (03/10/2024), Migraine with aura, not intractable, without status migrainosus (10/24/2022), Open wound (03/10/2024), Open wound of left foot (03/10/2024), Other conditions influencing health status (07/02/2013), Other conditions influencing health status (07/02/2013), Other conditions influencing health status (07/02/2013), Other conditions influencing health status (05/22/2015), Other conditions influencing health status (10/24/2022), Other conditions influencing health status (03/14/2022), Other long term (current) drug therapy (10/24/2022), Overweight with body mass index (BMI) 25.0-29.9 (03/10/2024), Pain of toe (03/10/2024), Personal history of COVID-19 (04/04/2023), Personal history of diseases of the blood and blood-forming organs and certain disorders involving the immune mechanism (07/02/2013), Personal history of diseases of the skin and subcutaneous tissue (08/11/2015), Personal history of nephrotic syndrome (07/02/2013), Personal history of other diseases of the circulatory system (08/27/2013), Personal history of other diseases of the nervous system and sense organs, Personal history of other diseases of the respiratory system, Personal history of other infectious and parasitic diseases (07/02/2013), Personal history of other specified conditions, Personal history of other specified conditions (08/27/2013), Posterior epistaxis (03/10/2024), Puckering of  macula, right eye (10/24/2022), Raynaud's syndrome without gangrene (07/02/2013), Sinusitis (03/10/2024), Systemic lupus erythematosus, unspecified (Multi) (07/24/2015), Systemic lupus erythematosus, unspecified (Multi) (07/24/2015), Systemic lupus erythematosus, unspecified (Multi) (07/24/2015), 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) (07/02/2013), Type 2 diabetes mellitus with mild nonproliferative diabetic retinopathy without macular edema, left eye (Multi) (07/27/2015), Type 2 diabetes mellitus with mild nonproliferative diabetic retinopathy without macular edema, unspecified eye (Multi) (07/24/2015), Type 2 diabetes mellitus with proliferative diabetic retinopathy without macular edema, right eye (Multi) (07/27/2015), Type 2 diabetes mellitus with proliferative diabetic retinopathy without macular edema, unspecified eye (Multi) (07/24/2015), Unspecified acute and subacute iridocyclitis (07/24/2015), and Unspecified open wound, left foot, sequela (07/03/2018).     Social History   reports that she has never smoked. She has never been exposed to tobacco smoke. She has never used smokeless tobacco. She reports that she does not currently use alcohol. She reports that she does not use drugs.     Family History  family history includes CARDIAC DISORDER in her father, mother, and sister; COPD in her father; Cataracts in her mother; Depression in her sister; Diabetes in her mother; Glaucoma in her father; Hypertension in her father; Kidney disease in her mother and sister; Lupus in her mother; RENAL DISEASE in her mother; Sickle cell trait in her sister; Sleep apnea in her daughter and father; Stroke in her mother.     Allergies  Allergies   Allergen Reactions    Ace Inhibitors Shortness of breath, Swelling, Other and Angioedema     Facial droop    Hydroxychloroquine Other and Nausea/vomiting     Retinal Bleeding    Lisinopril Swelling    Sulfa (Sulfonamide Antibiotics) Hives        Medications  Current Outpatient Medications   Medication Instructions    acetaminophen (TYLENOL) 650 mg, oral, Every 4 hours PRN    albuterol-budesonide (Airsupra) 90-80 mcg/actuation inhaler 2 puffs, inhalation, Every 6 hours PRN    atorvastatin (LIPITOR) 40 mg, oral, Daily    balsam peru-castor oiL (Venelex) ointment 1 Application, Topical, 2 times daily, To buttocks, sacrum, and thighs    blood-glucose sensor (Dexcom G6 Sensor) device Use to check sugars 3 times daily    Dexcom G4 platinum  (Dexcom G6 ) misc Use as instructed    Dexcom G4 platinum transmitter (Dexcom G6 Transmitter) device Use as instructed    fludrocortisone (FLORINEF) 0.1 mg, oral, Every other day    fluticasone-umeclidin-vilanter (TRELEGY-ELLIPTA) 200-62.5-25 mcg blister with device 1 puff, inhalation, Daily    folic acid (Folvite) 1 mg tablet TAKE 1 TABLET BY MOUTH EVERY DAY    furosemide (LASIX) 40 mg, oral, Daily    glucagon HCL (GLUCAGON (HCL) EMERGENCY KIT) 1 mg, intramuscular, As needed    guaiFENesin (MUCINEX) 1,200 mg, oral, 2 times daily, Do not crush, chew, or split.    hydrocortisone (CORTEF) 10 mg, oral, Daily, In the afternoon    hydrocortisone (CORTEF) 20 mg, oral, Every morning    insulin lispro (HUMALOG) 0-10 Units, subcutaneous, 3 times daily PRN, Take as directed per insulin Sliding Scale instructions.<BR>0-150 = 0 units<BR>151-200 = 2 units<BR>201-250 = 4 units<BR>251-300 = 6 units<BR>301-350 = 8 units<BR>351-400 = 10 units<BR>>400 = give 10 units and contact MD    Lantus U-100 Insulin 10 Units, subcutaneous, Every morning, Take as directed per insulin instructions.    levETIRAcetam (KEPPRA) 500 mg, oral, Every 12 hours    meclizine (ANTIVERT) 25 mg, oral, Every 12 hours PRN    melatonin 5 mg tablet 1 tablet, oral, Nightly    metoprolol tartrate (LOPRESSOR) 25 mg, oral, 2 times daily    multivitamin with minerals tablet 1 tablet, oral, Daily    mycophenolate (CELLCEPT) 1,000 mg, oral, 2 times daily     "paliperidone (INVEGA) 6 mg, oral, Daily, Do not crush, chew, or split.    paliperidone (INVEGA) 9 mg, oral, Every morning, Total of 15mg    pantoprazole (ProtoNix) 40 mg EC tablet TAKE 1 TABLET BY MOUTH EVERY DAY    pen needle, diabetic 31 gauge x 5/16\" needle Use to inject 1-4 times daily as directed.    potassium chloride CR 20 mEq ER tablet 20 mEq, oral, Nightly    sertraline (ZOLOFT) 25 mg, oral, Nightly    thiamine (VITAMIN B-1) 100 mg, oral, Daily        Objective   Visit Vitals  BP 90/59   Pulse 92   Temp 36.2 °C (97.2 °F)   Resp 17        General: Lethargic. Appears ill. Does not respond to voice.   HEENT: AT/NC.   CV: Irregular.   Resp: CTA anteriorly.   GI: Soft, NT/ND.   Extrem: + LE edema.  Skin: No Jaundice.   Neuro: Unable to assess.   Psych: Unable to assess.       Results from last 7 days   Lab Units 07/15/24  1114 07/09/24  0547   WBC AUTO x10*3/uL 2.4* 4.5   HEMOGLOBIN g/dL 8.4* 9.0*   HEMATOCRIT % 28.1* 31.1*   PLATELETS AUTO x10*3/uL 68* 85*     Results from last 7 days   Lab Units 07/15/24  1114 07/09/24  0547   SODIUM mmol/L 144 146*   POTASSIUM mmol/L 4.4 4.7   CHLORIDE mmol/L 113* 115*   CO2 mmol/L 22 22   BUN mg/dL 28* 23   CREATININE mg/dL 2.41* 1.39*   CALCIUM mg/dL 8.0* 8.2*   PROTEIN TOTAL g/dL 5.1*  --    BILIRUBIN TOTAL mg/dL 0.3  --    ALK PHOS U/L 134  --    ALT U/L 27  --    AST U/L 34  --    GLUCOSE mg/dL 49* 83         ASSESSMENT/PLAN:  Bing Holliday is a 63 y.o. female with a PMH of SLE, lupus cerebritis, RA, Raynaud's syndrome, hyperparathyroidism, adrenal insufficiency, COPD, DM, HTN, HLD, atrial fibrillation (not on anticoagulation), CKD, pacemaker, seizures, psychosis who presented with concerns for shock 2/2 adrenal insufficiency + UA (prior positive urine culture) and in whom we are consulted for diverticulitis.  Patient is altered and unable to participate in review of systems. Last colonoscopy 2018: 3 polyps (largest 15 mm), diverticulosis, hemorrhoids.    Patient is " hypothermic, on pressors.  On linezolid.  + Pancytopenia, normal lactate.  + JED on CKD.  Blood cultures pending.  CT C/A/P without contrast: Small right effusion, small ascites, uncomplicated  diverticulitis.    Patient with CT imaging concerning for uncomplicated diverticulitis. Benign abdominal exam. Multiple other significant medical issues concurrently.     -Antibiotics per ICU (anaerobic/gram negative coverage).   -Supportive care.   -Depending on GOC discussion, may consider outpatient colonoscopy in ~6 weeks.     Will sign off, please call with any questions.     Flip Shi,   GI Attending

## 2024-07-15 NOTE — PROCEDURES
Central Line    Date/Time: 7/15/2024 3:22 PM    Performed by: Jaziel Tyler DO  Authorized by: Krista Lambert MD    Consent:     Consent obtained:  Verbal    Consent given by:  Guardian    Risks, benefits, and alternatives were discussed: yes      Risks discussed:  Arterial puncture, incorrect placement, nerve damage, pneumothorax, infection and bleeding    Alternatives discussed:  No treatment  Universal protocol:     Procedure explained and questions answered to patient or proxy's satisfaction: yes      Relevant documents present and verified: yes      Test results available: yes      Imaging studies available: yes      Required blood products, implants, devices, and special equipment available: yes      Site/side marked: yes      Immediately prior to procedure, a time out was called: yes      Patient identity confirmed:  Arm band  Pre-procedure details:     Indication(s): central venous access      Hand hygiene: Hand hygiene performed prior to insertion      Sterile barrier technique: All elements of maximal sterile technique followed      Skin preparation:  Chlorhexidine    Skin preparation agent: Skin preparation agent completely dried prior to procedure    Sedation:     Sedation type:  None  Anesthesia:     Anesthesia method:  Local infiltration    Local anesthetic:  Lidocaine 1% WITH epi  Procedure details:     Location:  L internal jugular    Patient position:  Trendelenburg    Procedural supplies:  Triple lumen    Catheter size:  7 Fr    Landmarks identified: yes      Ultrasound guidance: yes      Ultrasound guidance timing: prior to insertion and real time      Sterile ultrasound techniques: Sterile gel and sterile probe covers were used      Number of attempts:  1    Successful placement: yes    Post-procedure details:     Post-procedure:  Dressing applied and line sutured    Assessment:  Blood return through all ports, no pneumothorax on x-ray and free fluid flow    Procedure completion:  Tolerated well,  no immediate complications  Comments:      Central line retracted and re-sutured after X-ray showed central line inserted too deeply.

## 2024-07-15 NOTE — ED PROVIDER NOTES
"EMERGENCY DEPARTMENT ENCOUNTER      Pt Name: Bing Holliday  MRN: [id number]  Birthdate 1960  Date of evaluation: 7/15/2024  Provider: Toni Mccullough DO    CHIEF COMPLAINT       Chief Complaint   Patient presents with    Weakness, Gen     Sent by SNF for \"decline over several days\"         HISTORY OF PRESENT ILLNESS    63-year-old female, she of adrenal insufficiency, lupus, CKD, type 2 diabetes, comes to the emergency room today.  She is hypotensive, hypoglycemic, hypothermic on arrival.  Patient unable to provide history          Nursing Notes were reviewed.    PAST MEDICAL HISTORY     Past Medical History:   Diagnosis Date    Abnormal kidney function 04/04/2023    Acute headache 03/10/2024    Acute iritis of right eye 07/24/2015    Acute low back pain 10/30/2023    Acute upper respiratory infection, unspecified 03/04/2020    Acute URI    Acute upper respiratory infection, unspecified 09/30/2015    URTI (acute upper respiratory infection)    Arthralgia of right knee 02/14/2024    Arthritis     Atypical facial pain 03/10/2024    Body mass index (BMI) 23.0-23.9, adult 10/15/2021    BMI 23.0-23.9, adult    Body mass index (BMI) 33.0-33.9, adult 03/04/2020    BMI 33.0-33.9,adult    Cardiomegaly 08/27/2013    Left ventricular hypertrophy    Chest pain 06/10/2022    Comment on above: Added by Problem List Migration; 2013-3-12; Moved to Suppressed Nov 25 2013 9:16PM; Comment on above: Added by Problem List Migration; 2013-3-12; Moved to Suppressed Nov 25 2013 9:16PM;    Chronic kidney disease, stage 3 unspecified (Multi) 07/02/2013    Chronic kidney disease, stage III (moderate)    Confusional state 03/10/2024    Contact with and (suspected) exposure to covid-19 04/04/2023    Delirium 03/10/2024    Disease of pericardium, unspecified ([facility]) 07/02/2013    Pericardial disease    Encounter for follow-up examination after completed treatment for conditions other than malignant neoplasm 10/06/2022    Hospital discharge " follow-up    Generalized contraction of visual field, right eye 01/29/2015    Generalized contraction of visual field of right eye    Hearing loss 03/10/2024    History of cataract 03/10/2024    History of thrombocytopenia 03/10/2024    Comment on above: Added by Problem List Migration; 2013-7-2;    Homonymous bilateral field defects, right side 04/29/2016    Homonymous bilateral field defects of right side    Hypertensive chronic kidney disease with stage 1 through stage 4 chronic kidney disease, or unspecified chronic kidney disease 07/02/2013    Nephrosclerosis    Laceration without foreign body, left foot, initial encounter 07/03/2018    Foot laceration, left, initial encounter    Localized edema 03/10/2024    Localized, primary osteoarthritis 02/14/2024    Mass of skin 03/10/2024    Migraine with aura, not intractable, without status migrainosus 10/24/2022    Ocular migraine    Open wound 03/10/2024    Open wound of left foot 03/10/2024    Other conditions influencing health status 07/02/2013    Chronic Glomerulonephritis In Diseases Classified Elsewhere    Other conditions influencing health status 07/02/2013    Progressive Familial Myoclonic Epilepsy    Other conditions influencing health status 07/02/2013    Protein S Deficiency    Other conditions influencing health status 05/22/2015    Familial Combined Hyperlipidemia    Other conditions influencing health status 10/24/2022    IDDM (insulin dependent diabetes mellitus)    Other conditions influencing health status 03/14/2022    Diabetes mellitus, insulin dependent (IDDM), uncontrolled    Other long term (current) drug therapy 10/24/2022    Long-term use of Plaquenil    Overweight with body mass index (BMI) 25.0-29.9 03/10/2024    Pain of toe 03/10/2024    Personal history of COVID-19 04/04/2023    Personal history of diseases of the blood and blood-forming organs and certain disorders involving the immune mechanism 07/02/2013    History of thrombocytopenia     Personal history of diseases of the skin and subcutaneous tissue 08/11/2015    History of foot ulcer    Personal history of nephrotic syndrome 07/02/2013    History of nephrotic syndrome    Personal history of other diseases of the circulatory system 08/27/2013    History of sinus tachycardia    Personal history of other diseases of the nervous system and sense organs     History of cataract    Personal history of other diseases of the respiratory system     History of bronchitis    Personal history of other infectious and parasitic diseases 07/02/2013    History of hepatitis    Personal history of other specified conditions     History of shortness of breath    Personal history of other specified conditions 08/27/2013    History of edema    Posterior epistaxis 03/10/2024    Puckering of macula, right eye 10/24/2022    ERM OD (epiretinal membrane, right eye)    Raynaud's syndrome without gangrene 07/02/2013    Raynaud's disease    Sinusitis 03/10/2024    Systemic lupus erythematosus, unspecified (Multi) 07/24/2015    SLE (systemic lupus erythematosus)    Systemic lupus erythematosus, unspecified (Multi) 07/24/2015    SLE (systemic lupus erythematosus)    Systemic lupus erythematosus, unspecified (Multi) 07/24/2015    Systemic lupus    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) 07/02/2013    Type 2 diabetes with nephropathy    Type 2 diabetes mellitus with mild nonproliferative diabetic retinopathy without macular edema, left eye (Multi) 07/27/2015    Non-proliferative diabetic retinopathy, left eye    Type 2 diabetes mellitus with mild nonproliferative diabetic retinopathy without macular edema, unspecified eye (Multi) 07/24/2015    Mild non proliferative diabetic retinopathy    Type 2 diabetes mellitus with proliferative diabetic retinopathy without macular edema, right eye (Multi) 07/27/2015    Proliferative diabetic retinopathy of right eye    Type 2 diabetes mellitus with proliferative  diabetic retinopathy without macular edema, unspecified eye (Multi) 07/24/2015    Diabetic proliferative retinopathy    Unspecified acute and subacute iridocyclitis 07/24/2015    Acute iritis, right eye    Unspecified open wound, left foot, sequela 07/03/2018    Wound, open, foot, left, sequela         SURGICAL HISTORY       Past Surgical History:   Procedure Laterality Date    ANKLE SURGERY  01/29/2015    Ankle Surgery    CARDIAC ELECTROPHYSIOLOGY PROCEDURE Left 5/17/2024    Procedure: PPM IMPLANT DUAL;  Surgeon: Antonio Rodriges MD;  Location: [facility];  Service: Electrophysiology;  Laterality: Left;  Pt. needs platelets and Prednisone prior to procedure    CHOLECYSTECTOMY  01/29/2015    Cholecystectomy    CT GUIDED PERCUTANEOUS BIOPSY BONE DEEP  5/4/2021    CT GUIDED PERCUTANEOUS BIOPSY BONE DEEP 5/4/2021 [facility] CLINICAL LEGAC[facility]    EYE SURGERY  03/06/2015    Eye Surgery    FOOT SURGERY  01/29/2015    Foot Surgery    MR HEAD ANGIO WO IV CONTRAST  7/26/2013    MR HEAD ANGIO WO IV CONTRAST 7/26/2013 [facility] CLINICAL LEGACY    MR HEAD ANGIO WO IV CONTRAST  9/17/2021    MR HEAD ANGIO WO IV CONTRAST 9/17/2021 [facility]    MR HEAD ANGIO WO IV CONTRAST  3/25/2023    MR HEAD ANGIO WO IV CONTRAST [facility]    MR NECK ANGIO WO IV CONTRAST  7/26/2013    MR NECK ANGIO WO IV CONTRAST 7/26/2013 [facility] CLINICAL LEGACY    MR NECK ANGIO WO IV CONTRAST  9/17/2021    MR NECK ANGIO WO IV CONTRAST 9/17/2021 [facility]    MR NECK ANGIO WO IV CONTRAST  3/25/2023    MR NECK ANGIO WO IV CONTRAST [facility]    OTHER SURGICAL HISTORY  01/29/2015    Creation Of Pericardial Window    OTHER SURGICAL HISTORY  01/29/2015    Quadricepsplasty    TOTAL HIP ARTHROPLASTY  01/29/2015    Hip Replacement         CURRENT MEDICATIONS       Current Discharge Medication List        CONTINUE these medications which have NOT CHANGED    Details   acetaminophen (Tylenol) 325 mg tablet Take 2 tablets (650 mg) by mouth every 4 hours if needed  for mild pain (1 - 3), moderate pain (4 - 6) or fever (temp greater than 38.0 C).      albuterol-budesonide (Airsupra) 90-80 mcg/actuation inhaler Inhale 2 puffs every 6 hours if needed (sob/wheezing).      atorvastatin (Lipitor) 40 mg tablet Take 1 tablet (40 mg) by mouth once daily.  Qty: 90 tablet, Refills: 3    Associated Diagnoses: Hyperlipidemia, unspecified hyperlipidemia type      balsam peru-castor oiL (Venelex) ointment Apply 1 Application topically 2 times a day. To buttocks, sacrum, and thighs      blood-glucose sensor (Dexcom G6 Sensor) device Use to check sugars 3 times daily  Qty: 4 each, Refills: 2    Associated Diagnoses: Uncontrolled diabetes mellitus with hyperglycemia, without long-term current use of insulin (Multi)      Dexcom G4 platinum  (Dexcom G6 ) misc Use as instructed  Qty: 1 each, Refills: 0    Associated Diagnoses: Uncontrolled diabetes mellitus with hyperglycemia, without long-term current use of insulin (Multi)      Dexcom G4 platinum transmitter (Dexcom G6 Transmitter) device Use as instructed  Qty: 1 each, Refills: 0    Associated Diagnoses: Uncontrolled diabetes mellitus with hyperglycemia, without long-term current use of insulin (Multi)      fludrocortisone (Florinef) 0.1 mg tablet Take 1 tablet (0.1 mg) by mouth every other day.    Associated Diagnoses: Adrenal insufficiency (Multi)      fluticasone-umeclidin-vilanter (TRELEGY-ELLIPTA) 200-62.5-25 mcg blister with device Inhale 1 puff once daily.  Qty: 60 each, Refills: 5    Comments: [facility] - delivery and automatic refill for all scripts- PAP approved - Contact loli vickers with any questions  Associated Diagnoses: Advanced COPD (Multi)      folic acid (Folvite) 1 mg tablet TAKE 1 TABLET BY MOUTH EVERY DAY  Qty: 90 tablet, Refills: 1    Associated Diagnoses: Anemia, unspecified      furosemide (Lasix) 40 mg tablet Take 1 tablet (40 mg) by mouth once daily.      glucagon HCL (Glucagon, HCl, Emergency Kit) 1  mg recon soln Inject 1 mg into the muscle if needed.      guaiFENesin (Mucinex) 600 mg 12 hr tablet Take 2 tablets (1,200 mg) by mouth 2 times a day. Do not crush, chew, or split.      !! hydrocortisone (Cortef) 10 mg tablet Take 1 tablet (10 mg) by mouth once daily. In the afternoon    Associated Diagnoses: Adrenal insufficiency (Multi)      !! hydrocortisone (Cortef) 20 mg tablet Take 1 tablet (20 mg) by mouth once daily in the morning.    Associated Diagnoses: Adrenal insufficiency (Multi)      insulin glargine (Lantus U-100 Insulin) 100 unit/mL injection Inject 10 Units under the skin once daily in the morning. Take as directed per insulin instructions.      insulin lispro (HumaLOG) 100 unit/mL injection Inject 0-0.1 mL (0-10 Units) under the skin 3 times a day as needed for high blood sugar (before meals). Take as directed per insulin Sliding Scale instructions.  0-150 = 0 units  151-200 = 2 units  201-250 = 4 units  251-300 = 6 units  301-350 = 8 units  351-400 = 10 units  >400 = give 10 units and contact MD      levETIRAcetam (Keppra) 500 mg tablet Take 1 tablet (500 mg) by mouth every 12 hours.  Qty: 60 tablet, Refills: 0    Associated Diagnoses: Lupus (Multi)      meclizine (Antivert) 25 mg tablet Take 1 tablet (25 mg) by mouth every 12 hours if needed for dizziness.      melatonin 5 mg tablet Take 1 tablet (5 mg) by mouth once daily at bedtime.      metoprolol tartrate (Lopressor) 25 mg tablet Take 1 tablet (25 mg) by mouth 2 times a day.    Associated Diagnoses: PAF (paroxysmal atrial fibrillation) (Multi)      multivitamin with minerals tablet Take 1 tablet by mouth once daily.      mycophenolate (Cellcept) 500 mg tablet TAKE 2 TABLETS BY MOUTH TWICE A DAY  Qty: 360 tablet, Refills: 0    Associated Diagnoses: Systemic lupus erythematosus, unspecified SLE type, unspecified organ involvement status (Multi)      !! paliperidone (Invega) 6 mg 24 hr tablet Take 1 tablet (6 mg) by mouth once daily. Do not  "crush, chew, or split.    Associated Diagnoses: Disorientation      !! paliperidone (Invega) 9 mg 24 hr tablet Take 1 tablet (9 mg) by mouth once daily in the morning. Total of 15mg      pantoprazole (ProtoNix) 40 mg EC tablet TAKE 1 TABLET BY MOUTH EVERY DAY  Qty: 90 tablet, Refills: 1    Associated Diagnoses: Gastro-esophageal reflux disease without esophagitis      pen needle, diabetic 31 gauge x 5/16\" needle Use to inject 1-4 times daily as directed.  Qty: 100 each, Refills: 11    Associated Diagnoses: DM type 2 with diabetic peripheral neuropathy (Multi)      potassium chloride CR 20 mEq ER tablet Take 1 tablet (20 mEq) by mouth once daily at bedtime.      sertraline (Zoloft) 25 mg tablet Take 1 tablet (25 mg) by mouth once daily at bedtime.      thiamine 100 mg tablet Take 1 tablet (100 mg) by mouth once daily.       !! - Potential duplicate medications found. Please discuss with provider.          ALLERGIES     Ace inhibitors, Hydroxychloroquine, Lisinopril, and Sulfa (sulfonamide antibiotics)    FAMILY HISTORY       Family History   Problem Relation Name Age of Onset    Other (RENAL DISEASE) Mother          END STAGE    Other (CARDIAC DISORDER) Mother      Cataracts Mother      Stroke Mother      Diabetes Mother      Kidney disease Mother      Lupus Mother      Other (CARDIAC DISORDER) Father      COPD Father      Glaucoma Father      Hypertension Father      Sleep apnea Father      Other (CARDIAC DISORDER) Sister      Depression Sister      Kidney disease Sister      Sickle cell trait Sister      Sleep apnea Daughter            SOCIAL HISTORY       Social History     Socioeconomic History    Marital status:    Tobacco Use    Smoking status: Never     Passive exposure: Never    Smokeless tobacco: Never   Vaping Use    Vaping status: Never Used   Substance and Sexual Activity    Alcohol use: Not Currently     Comment: RARE    Drug use: Never    Sexual activity: Defer     Social Determinants of Health "     Financial Resource Strain: Patient Unable To Answer (7/15/2024)    Overall Financial Resource Strain (CARDIA)     Difficulty of Paying Living Expenses: Patient unable to answer   Food Insecurity: Patient Unable To Answer (7/15/2024)    Hunger Vital Sign     Worried About Running Out of Food in the Last Year: Patient unable to answer     Ran Out of Food in the Last Year: Patient unable to answer   Transportation Needs: Patient Unable To Answer (7/15/2024)    PRAPARE - Transportation     Lack of Transportation (Medical): Patient unable to answer     Lack of Transportation (Non-Medical): Patient unable to answer   Physical Activity: Inactive (7/15/2024)    Exercise Vital Sign     Days of Exercise per Week: 0 days     Minutes of Exercise per Session: 0 min   Stress: Patient Unable To Answer (7/15/2024)    [employer] - Occupational Stress Questionnaire     Feeling of Stress : Patient unable to answer   Social Connections: Patient Unable To Answer (7/15/2024)    Social Connection and Isolation Panel [NHANES]     Frequency of Communication with Friends and Family: Patient unable to answer     Frequency of Social Gatherings with Friends and Family: Patient unable to answer     Attends Gnosticist Services: Patient unable to answer     Active Member of Clubs or Organizations: Patient unable to answer     Attends Club or Organization Meetings: Patient unable to answer     Marital Status: Patient unable to answer   Intimate Partner Violence: Patient Unable To Answer (7/15/2024)    Humiliation, Afraid, Rape, and Kick questionnaire     Fear of Current or Ex-Partner: Patient unable to answer     Emotionally Abused: Patient unable to answer     Physically Abused: Patient unable to answer     Sexually Abused: Patient unable to answer   Housing Stability: Patient Unable To Answer (7/15/2024)    Housing Stability Vital Sign     Unable to Pay for Housing in the Last Year: Patient unable to answer      Number of Times Moved in the Last Year: 1     Homeless in the Last Year: Patient unable to answer   Recent Concern: Housing Stability - High Risk (6/4/2024)    Housing Stability Vital Sign     Unable to Pay for Housing in the Last Year: No     Number of Places Lived in the Last Year: 3     Unstable Housing in the Last Year: No       SCREENINGS                        PHYSICAL EXAM    (up to 7 for level 4, 8 or more for level 5)     ED Triage Vitals   Temp Pulse Resp BP   -- -- -- --      SpO2 Temp src Heart Rate Source Patient Position   -- -- -- --      BP Location FiO2 (%)     -- --       Physical Exam  Constitutional:       Appearance: She is ill-appearing.   HENT:      Head: Normocephalic and atraumatic.   Eyes:      General: No scleral icterus.        Right eye: No discharge.         Left eye: No discharge.      Conjunctiva/sclera: Conjunctivae normal.   Cardiovascular:      Rate and Rhythm: Normal rate. Rhythm irregular.   Abdominal:      General: Abdomen is flat.      Palpations: Abdomen is soft.   Skin:     General: Skin is warm and dry.      Capillary Refill: Capillary refill takes less than 2 seconds.   Psychiatric:         Mood and Affect: Mood normal.         Behavior: Behavior normal.          DIAGNOSTIC RESULTS     LABS:  Labs Reviewed   CBC WITH AUTO DIFFERENTIAL - Abnormal       Result Value    WBC 2.4 (*)     nRBC 5.5 (*)     RBC 2.75 (*)     Hemoglobin 8.4 (*)     Hematocrit 28.1 (*)      (*)     MCH 30.5      MCHC 29.9 (*)     RDW 22.0 (*)     Platelets 68 (*)     Neutrophils % 42.3      Immature Granulocytes %, Automated 0.8      Lymphocytes % 40.9      Monocytes % 14.8      Eosinophils % 0.8      Basophils % 0.4      Neutrophils Absolute 1.00 (*)     Immature Granulocytes Absolute, Automated 0.02      Lymphocytes Absolute 0.97 (*)     Monocytes Absolute 0.35      Eosinophils Absolute 0.02      Basophils Absolute 0.01     COMPREHENSIVE METABOLIC PANEL - Abnormal    Glucose 49 (*)     Sodium  144      Potassium 4.4      Chloride 113 (*)     Bicarbonate 22      Anion Gap 13      Urea Nitrogen 28 (*)     Creatinine 2.41 (*)     eGFR 22 (*)     Calcium 8.0 (*)     Albumin 2.5 (*)     Alkaline Phosphatase 134      Total Protein 5.1 (*)     AST 34      Bilirubin, Total 0.3      ALT 27     AMMONIA - Abnormal    Ammonia 11 (*)    SERIAL TROPONIN-INITIAL - Abnormal    Troponin I, High Sensitivity 32 (*)     Narrative:     Less than 99th percentile of normal range cutoff-  Female and children under 18 years old <14 ng/L; Male <21 ng/L: Negative  Repeat testing should be performed if clinically indicated.     Female and children under 18 years old 14-50 ng/L; Male 21-50 ng/L:  Consistent with possible cardiac damage and possible increased clinical   risk. Serial measurements may help to assess extent of myocardial damage.     >50 ng/L: Consistent with cardiac damage, increased clinical risk and  myocardial infarction. Serial measurements may help assess extent of   myocardial damage.      NOTE: Children less than 1 year old may have higher baseline troponin   levels and results should be interpreted in conjunction with the overall   clinical context.     NOTE: Troponin I testing is performed using a different   testing methodology at [facility] than at other   [facility]. Direct result comparisons should only   be made within the same method.   URINALYSIS WITH REFLEX CULTURE AND MICROSCOPIC - Abnormal    Color, Urine Yellow      Appearance, Urine Clear      Specific Gravity, Urine 1.014      pH, Urine 5.0      Protein, Urine 30 (1+) (*)     Glucose, Urine Normal      Blood, Urine NEGATIVE      Ketones, Urine NEGATIVE      Bilirubin, Urine NEGATIVE      Urobilinogen, Urine Normal      Nitrite, Urine NEGATIVE      Leukocyte Esterase, Urine 75 Margarita/µL (*)    TSH WITH REFLEX TO FREE T4 IF ABNORMAL - Abnormal    Thyroid Stimulating Hormone 4.67 (*)     Narrative:     TSH testing is performed using  different testing methodology at [facility] than at other [facility]. Direct result comparisons should only be made within the same method.     SERIAL TROPONIN, 1 HOUR - Abnormal    Troponin I, High Sensitivity 45 (*)     Narrative:     Less than 99th percentile of normal range cutoff-  Female and children under 18 years old <14 ng/L; Male <21 ng/L: Negative  Repeat testing should be performed if clinically indicated.     Female and children under 18 years old 14-50 ng/L; Male 21-50 ng/L:  Consistent with possible cardiac damage and possible increased clinical   risk. Serial measurements may help to assess extent of myocardial damage.     >50 ng/L: Consistent with cardiac damage, increased clinical risk and  myocardial infarction. Serial measurements may help assess extent of   myocardial damage.      NOTE: Children less than 1 year old may have higher baseline troponin   levels and results should be interpreted in conjunction with the overall   clinical context.     NOTE: Troponin I testing is performed using a different   testing methodology at [facility] than at other   [facility]. Direct result comparisons should only   be made within the same method.   MICROSCOPIC ONLY, URINE - Abnormal    WBC, Urine 11-20 (*)     RBC, Urine 1-2      Mucus, Urine FEW      Hyaline Casts, Urine 1+ (*)    POCT GLUCOSE - Abnormal    POCT Glucose 49 (*)    POCT GLUCOSE - Abnormal    POCT Glucose 119 (*)    POCT GLUCOSE - Abnormal    POCT Glucose 102 (*)    POCT GLUCOSE - Abnormal    POCT Glucose 151 (*)    POCT GLUCOSE - Abnormal    POCT Glucose 125 (*)    BLOOD GAS VENOUS FULL PANEL UNSOLICITED - Abnormal    POCT pH, Venous 7.25 (*)     POCT pCO2, Venous 55 (*)     POCT pO2, Venous 33 (*)     POCT SO2, Venous 44 (*)     POCT Oxy Hemoglobin, Venous 43.6 (*)     POCT Hematocrit Calculated, Venous 26.0 (*)     POCT Sodium, Venous 145      POCT Potassium, Venous 4.4      POCT Chloride, Venous 113  (*)     POCT Ionized Calicum, Venous 1.18      POCT Glucose, Venous 51 (*)     POCT Lactate, Venous 2.3 (*)     POCT Base Excess, Venous -3.2 (*)     POCT HCO3 Calculated, Venous 24.1      POCT Hemoglobin, Venous 8.5 (*)     POCT Anion Gap, Venous 12.0      Patient Temperature        Critical Called By SANDRA      Critical Called To DR CASTILLO      Critical Call Time 1101      Critical Read Back Y     BLOOD CULTURE - Normal    Blood Culture Loaded on Instrument - Culture in progress     BLOOD CULTURE - Normal    Blood Culture Loaded on Instrument - Culture in progress     LACTATE - Normal    Lactate 1.8      Narrative:     Venipuncture immediately after or during the administration of Metamizole may lead to falsely low results. Testing should be performed immediately  prior to Metamizole dosing.   LIPASE - Normal    Lipase 21      Narrative:     Venipuncture immediately after or during the administration of Metamizole may lead to falsely low results. Testing should be performed immediately prior to Metamizole dosing.   PROTIME-INR - Normal    Protime 11.8      INR 1.0     THYROXINE, FREE - Normal    Thyroxine, Free 0.95      Narrative:     Thyroxine Free testing is performed using different testing methodology at [facility] than at other [facility]. Direct result comparisons should only be made within the same method.    Biotin can cause falsely elevated free T4 results. Patients taking a Biotin dose of up to 10 mg/day should refrain from taking Biotin for 24 hours before sample collection. Patient taking a Biotin dose of >10 mg/day should consult with their physician or the laboratory before the blood draw.   MAGNESIUM - Normal    Magnesium 1.70     URINE CULTURE   STAPHYLOCOCCUS AUREUS/MRSA COLONIZATION, CULTURE   TROPONIN SERIES- (INITIAL, 1 HR)    Narrative:     The following orders were created for panel order Troponin I Series, High Sensitivity (0, 1 HR).  Procedure                                Abnormality         Status                     ---------                               -----------         ------                     Troponin I, High Sensiti...[[id number]]  Abnormal            Final result               Troponin, High Sensitivi...[[id number]]  Abnormal            Final result                 Please view results for these tests on the individual orders.   URINALYSIS WITH REFLEX CULTURE AND MICROSCOPIC    Narrative:     The following orders were created for panel order Urinalysis with Reflex Culture and Microscopic.  Procedure                               Abnormality         Status                     ---------                               -----------         ------                     Urinalysis with Reflex C...[[id number]]  Abnormal            Final result               Extra Urine Gray Tube[[id number]]                            In process                   Please view results for these tests on the individual orders.   BLOOD GAS VENOUS FULL PANEL   EXTRA URINE GRAY TUBE   BLOOD GAS LACTIC ACID, VENOUS   POCT GLUCOSE       All other labs were within normal range or not returned as of this dictation.    Imaging  XR chest 1 view   Final Result   1.  Enlarged cardiac silhouette with pulmonary vascular congestion.                  MACRO:   None        Signed by: Mitesh Khan 7/15/2024 5:55 PM   Dictation workstation:   [id number]      XR chest 1 view   Final Result   The tip of left IJ central line is in the right atrium.   No apparent complication identified post central line placement   otherwise.        MACRO:   None        Signed by: Roni Dwyer 7/15/2024 3:46 PM   Dictation workstation:   [id number]      CT chest abdomen pelvis wo IV contrast   Final Result   Addendum (preliminary) 1 of 1   NOTIFICATION:  The positive results of the study were discussed with,   and acknowledged by AMINAH Hill, by telephone on 7/15/2024 at   1612 hours.   Signed by Cheko Wise MD      Final   In  comparison to the recent study, a small right effusion has   developed in addition to a small amount of ascites.   Findings are suspicious for diverticulitis at the hepatic flexure.  No   abscess or obvious perforation demonstrated.   The nodularity seen at the right base on the prior study has almost   completely resolved.  New small nodules are seen in the right middle   lobe and left lower lobe.  These are most likely inflammatory given   the relatively new appearance of these and the disappearance of other   nodules.  Continued follow-up is recommended..   Signed by Cheko Wise MD      XR chest 1 view   Final Result   Trace effusions versus pleural thickening. No focal consolidation.        MACRO:   None.        Signed by: Jerry Santacruz 7/15/2024 11:24 AM   Dictation workstation:   [id number]           Procedures  Critical Care    Performed by: Toni Mccullough DO  Authorized by: Claudio Schulte DO    Critical care provider statement:     Critical care time (minutes):  45    Critical care was necessary to treat or prevent imminent or life-threatening deterioration of the following conditions:  Sepsis    Critical care was time spent personally by me on the following activities:  Blood draw for specimens, vascular access procedures, review of old charts, re-evaluation of patient's condition, pulse oximetry, ordering and review of radiographic studies, ordering and review of laboratory studies, ordering and performing treatments and interventions, examination of patient and evaluation of patient's response to treatment       EMERGENCY DEPARTMENT COURSE/MDM:     ED Course as of 07/15/24 2019   Mon Jul 15, 2024   1150 GLUCOSE(!!): 49  Treated with d50, recheck 119 [RD]   1150 Comprehensive Metabolic Panel(!!)  JED likely prerenal due to hypotension.  Treated with fluids. [RD]   1253 Endocrinology dr. callejas notified, agreed with stress dose steroids  [RD]      ED Course User Index  [RD] Toni Mccullough DO         Diagnoses  as of 07/15/24 2019   Hypothermia, initial encounter   Hypoglycemia   Hypotension, unspecified hypotension type        Medical Decision Making  63-year-old female comes emergency room for hypotension, hypothermia, hypoglycemia.  Does not appear to have a history of adrenal insufficiency supposed to be on fludrocortisone at home.  She was hypothermic, placed on a Allan hugger.  Did order CBC, chemistry, troponins, UA, blood cultures, lactic, VBG, chest x-ray, initially.  We did look at the IVC, after liter of fluids and was plethoric so we did not give a full 30 cc/kg ideal body weight septic fluid bolus.  She was covered with linezolid and Zosyn initially given she has a history of vancomycin resistant Enterococcus.  I did give her 100 mg IV Solu-Cortef given history of adrenal insufficiency.  She was hypoglycemic initially, treated with a push of dextrose and her blood sugars did uptrend.  She was difficult vascular access, I placed an ultrasound IV in her right forearm and did give her 200 mcg push of Will-Synephrine for her blood pressure however pressures were not responsive and we did have to ultimately initiate norepinephrine.    Independent interpretation of labs, she had a acute on chronic leukopenia, slightly acute on chronic thrombocytopenia.  TSH was elevated, free T4 within normal limits, troponins were essentially flat, urinalysis was unimpressive in the ER.  VBG showed an indeterminate acidosis with a lactic of 2.3.  Chest x-ray showed no signs of pneumonia, pneumothorax.  She did have an JED on chemistry, treated with a liter of fluids however I do suspect this is prerenal but given IVC was plethoric we did not give any additional fluids and elected to initiate vasopressors.  Nurse was getting manual cuff pressures as we are not getting reliable pressures from the electronic blood pressure cuff.  I did speak with ICU attending Dr. Lambert, elected to get a CT chest abdomen pelvis without contrast and  ultimately the patient was sent up to the ICU for further management.        Patient and or family in agreement and understanding of treatment plan.  All questions answered.      I reviewed the case with the attending ED physician. The attending ED physician agrees with the plan. Patient and/or patient´s representative was counseled regarding labs, imaging, likely diagnosis, and plan. All questions were answered.    ED Medications administered this visit:    Medications   phenylephrine in NS (Will-Synephrine) syringe  - Omnicell Override Pull (  Not Given 7/15/24 1105)   norepinephrine (Levophed) 8 mg in dextrose 5% 250 mL (0.032 mg/mL) infusion (premix) (0.04 mcg/kg/min × 99.8 kg intravenous Rate/Dose Verify 7/15/24 2000)   alteplase (Cathflo Activase) injection 2 mg (has no administration in time range)   acetaminophen (Tylenol) tablet 650 mg (has no administration in time range)   atorvastatin (Lipitor) tablet 40 mg ( oral Dose Auto Held 7/19/24 2100)   folic acid (Folvite) tablet 1 mg (1 mg oral Not Given 7/15/24 1645)   levETIRAcetam (Keppra) 500 mg in sodium chloride (iso)  mL (0 mg intravenous Stopped 7/15/24 1703)   melatonin tablet 5 mg (5 mg oral Not Given 7/15/24 2100)   mycophenolate (Cellcept) capsule 1,000 mg ( oral Dose Auto Held 7/19/24 2100)   pantoprazole (ProtoNix) injection 40 mg (has no administration in time range)   thiamine (Vitamin B-1) tablet 100 mg (100 mg oral Not Given 7/15/24 1645)   sertraline (Zoloft) tablet 25 mg ( oral Dose Auto Held 7/19/24 2100)   heparin (porcine) injection 5,000 Units (5,000 Units subcutaneous Given 7/15/24 1648)   glucagon (Glucagen) injection 1 mg (has no administration in time range)   dextrose 50 % injection 25 g (has no administration in time range)   glucagon (Glucagen) injection 1 mg (has no administration in time range)   dextrose 50 % injection 12.5 g (has no administration in time range)   insulin lispro (HumaLOG) injection 0-5 Units (  subcutaneous Not Given 7/15/24 2045)   hydrocortisone sod succ (PF) (Solu-CORTEF) injection 100 mg (100 mg intravenous Given 7/15/24 1800)   piperacillin-tazobactam (Zosyn) 4.5 g in dextrose (iso)  mL (4.5 g intravenous New Bag 7/15/24 2000)   risperiDONE (RisperDAL) tablet 3 mg ( oral Dose Auto Held 7/19/24 2100)   ipratropium-albuteroL (Duo-Neb) 0.5-2.5 mg/3 mL nebulizer solution 3 mL (has no administration in time range)   budesonide (Pulmicort) 0.5 mg/2 mL nebulizer solution 0.5 mg (has no administration in time range)   formoterol (Perforomist) 20 mcg/2 mL nebulizer solution 20 mcg (has no administration in time range)   ipratropium-albuteroL (Duo-Neb) 0.5-2.5 mg/3 mL nebulizer solution 3 mL (has no administration in time range)   polyethylene glycol (Glycolax, Miralax) packet 17 g (has no administration in time range)   docusate sodium (Colace) capsule 100 mg (has no administration in time range)   linezolid (Zyvox)  mg in 300 mL (has no administration in time range)   dextrose 50 % injection 25 g (25 g intravenous Given 7/15/24 1100)   piperacillin-tazobactam (Zosyn) 3.375 g in dextrose (iso) IV 50 mL (0 g intravenous Stopped 7/15/24 1223)   hydrocortisone sod succ (PF) (Solu-CORTEF) injection 100 mg (100 mg intravenous Given 7/15/24 1150)   phenylephrine (Will-Synephrine) injection 200 mcg (200 mcg intravenous Given 7/15/24 1100)   sodium chloride 0.9 % bolus 1,000 mL (0 mL intravenous Stopped 7/15/24 1349)   magnesium sulfate 2 g in sterile water for injection 50 mL (0 g intravenous Stopped 7/15/24 1614)   lidocaine PF (Xylocaine) 10 mg/mL (1 %) injection 50 mg (50 mg infiltration Given 7/15/24 1702)   lactated Ringer's bolus 500 mL (0 mL intravenous Stopped 7/15/24 1821)       New Prescriptions from this visit:    Current Discharge Medication List          Follow-up:  No follow-up provider specified.      Final Impression:   1. Hypothermia, initial encounter    2. Hypoglycemia    3. Hypotension,  unspecified hypotension type          (Please note that portions of this note were completed with a voice recognition program.  Efforts were made to edit the dictations but occasionally words are mis-transcribed.)     Toni Mccullough DO  Resident  07/15/24 2019

## 2024-07-15 NOTE — NURSING NOTE
1330: Patient admitted form the ER     1345: Patient having runs of vtach. Dr. Tyler bedside. Orders for 2g of mag to be given as well as EKG. Dr. Tyler contacting family.     1500: Dr. Tyler bedside placing central line

## 2024-07-16 ENCOUNTER — APPOINTMENT (OUTPATIENT)
Dept: RADIOLOGY | Facility: HOSPITAL | Age: 64
DRG: 871 | End: 2024-07-16
Payer: MEDICARE

## 2024-07-16 LAB
ACANTHOCYTES BLD QL SMEAR: ABNORMAL
ALBUMIN SERPL BCP-MCNC: 2.5 G/DL (ref 3.4–5)
ALP SERPL-CCNC: 177 U/L (ref 33–136)
ALT SERPL W P-5'-P-CCNC: 25 U/L (ref 7–45)
ANION GAP SERPL CALC-SCNC: 15 MMOL/L (ref 10–20)
AST SERPL W P-5'-P-CCNC: 20 U/L (ref 9–39)
ATRIAL RATE: 73 BPM
ATRIAL RATE: 83 BPM
ATRIAL RATE: 94 BPM
BACTERIA UR CULT: NO GROWTH
BASOPHILS # BLD MANUAL: 0 X10*3/UL (ref 0–0.1)
BASOPHILS NFR BLD MANUAL: 0 %
BILIRUB SERPL-MCNC: 0.3 MG/DL (ref 0–1.2)
BUN SERPL-MCNC: 26 MG/DL (ref 6–23)
CALCIUM SERPL-MCNC: 7.9 MG/DL (ref 8.6–10.3)
CHLORIDE SERPL-SCNC: 112 MMOL/L (ref 98–107)
CO2 SERPL-SCNC: 20 MMOL/L (ref 21–32)
CREAT SERPL-MCNC: 2.32 MG/DL (ref 0.5–1.05)
EGFRCR SERPLBLD CKD-EPI 2021: 23 ML/MIN/1.73M*2
EOSINOPHIL # BLD MANUAL: 0.06 X10*3/UL (ref 0–0.7)
EOSINOPHIL NFR BLD MANUAL: 2 %
ERYTHROCYTE [DISTWIDTH] IN BLOOD BY AUTOMATED COUNT: 21.7 % (ref 11.5–14.5)
GLUCOSE BLD MANUAL STRIP-MCNC: 119 MG/DL (ref 74–99)
GLUCOSE BLD MANUAL STRIP-MCNC: 121 MG/DL (ref 74–99)
GLUCOSE BLD MANUAL STRIP-MCNC: 135 MG/DL (ref 74–99)
GLUCOSE BLD MANUAL STRIP-MCNC: 139 MG/DL (ref 74–99)
GLUCOSE BLD MANUAL STRIP-MCNC: 150 MG/DL (ref 74–99)
GLUCOSE BLD MANUAL STRIP-MCNC: 164 MG/DL (ref 74–99)
GLUCOSE SERPL-MCNC: 129 MG/DL (ref 74–99)
HCT VFR BLD AUTO: 27.4 % (ref 36–46)
HGB BLD-MCNC: 8.1 G/DL (ref 12–16)
IMM GRANULOCYTES # BLD AUTO: 0.06 X10*3/UL (ref 0–0.7)
IMM GRANULOCYTES NFR BLD AUTO: 2.1 % (ref 0–0.9)
LYMPHOCYTES # BLD MANUAL: 0.62 X10*3/UL (ref 1.2–4.8)
LYMPHOCYTES NFR BLD MANUAL: 22 %
MAGNESIUM SERPL-MCNC: 1.56 MG/DL (ref 1.6–2.4)
MAGNESIUM SERPL-MCNC: 1.87 MG/DL (ref 1.6–2.4)
MCH RBC QN AUTO: 29.7 PG (ref 26–34)
MCHC RBC AUTO-ENTMCNC: 29.6 G/DL (ref 32–36)
MCV RBC AUTO: 100 FL (ref 80–100)
METAMYELOCYTES # BLD MANUAL: 0.06 X10*3/UL
METAMYELOCYTES NFR BLD MANUAL: 2 %
MONOCYTES # BLD MANUAL: 0.11 X10*3/UL (ref 0.1–1)
MONOCYTES NFR BLD MANUAL: 4 %
MYELOCYTES # BLD MANUAL: 0.03 X10*3/UL
MYELOCYTES NFR BLD MANUAL: 1 %
NEUTROPHILS # BLD MANUAL: 1.93 X10*3/UL (ref 1.2–7.7)
NEUTS BAND # BLD MANUAL: 0.17 X10*3/UL (ref 0–0.7)
NEUTS BAND NFR BLD MANUAL: 6 %
NEUTS SEG # BLD MANUAL: 1.76 X10*3/UL (ref 1.2–7)
NEUTS SEG NFR BLD MANUAL: 63 %
NRBC BLD-RTO: 7 /100 WBCS (ref 0–0)
PHOSPHATE SERPL-MCNC: 4.3 MG/DL (ref 2.5–4.9)
PLATELET # BLD AUTO: 84 X10*3/UL (ref 150–450)
POTASSIUM SERPL-SCNC: 4.5 MMOL/L (ref 3.5–5.3)
PROT SERPL-MCNC: 5.2 G/DL (ref 6.4–8.2)
Q ONSET: 217 MS
Q ONSET: 219 MS
Q ONSET: 222 MS
QRS COUNT: 12 BEATS
QRS COUNT: 12 BEATS
QRS COUNT: 17 BEATS
QRS DURATION: 102 MS
QRS DURATION: 86 MS
QRS DURATION: 94 MS
QT INTERVAL: 300 MS
QT INTERVAL: 350 MS
QT INTERVAL: 436 MS
QTC CALCULATION(BAZETT): 396 MS
QTC CALCULATION(BAZETT): 398 MS
QTC CALCULATION(BAZETT): 477 MS
QTC FREDERICIA: 362 MS
QTC FREDERICIA: 380 MS
QTC FREDERICIA: 463 MS
R AXIS: -4 DEGREES
R AXIS: -5 DEGREES
R AXIS: 3 DEGREES
RBC # BLD AUTO: 2.73 X10*6/UL (ref 4–5.2)
RBC MORPH BLD: ABNORMAL
SODIUM SERPL-SCNC: 142 MMOL/L (ref 136–145)
T AXIS: 178 DEGREES
T AXIS: 222 DEGREES
T AXIS: 258 DEGREES
T OFFSET: 367 MS
T OFFSET: 397 MS
T OFFSET: 437 MS
TOTAL CELLS COUNTED BLD: 100
VENTRICULAR RATE: 106 BPM
VENTRICULAR RATE: 72 BPM
VENTRICULAR RATE: 77 BPM
WBC # BLD AUTO: 2.8 X10*3/UL (ref 4.4–11.3)

## 2024-07-16 PROCEDURE — 2500000004 HC RX 250 GENERAL PHARMACY W/ HCPCS (ALT 636 FOR OP/ED): Performed by: INTERNAL MEDICINE

## 2024-07-16 PROCEDURE — 94640 AIRWAY INHALATION TREATMENT: CPT

## 2024-07-16 PROCEDURE — 85027 COMPLETE CBC AUTOMATED: CPT

## 2024-07-16 PROCEDURE — 2500000002 HC RX 250 W HCPCS SELF ADMINISTERED DRUGS (ALT 637 FOR MEDICARE OP, ALT 636 FOR OP/ED): Performed by: INTERNAL MEDICINE

## 2024-07-16 PROCEDURE — 37799 UNLISTED PX VASCULAR SURGERY: CPT

## 2024-07-16 PROCEDURE — 2500000002 HC RX 250 W HCPCS SELF ADMINISTERED DRUGS (ALT 637 FOR MEDICARE OP, ALT 636 FOR OP/ED)

## 2024-07-16 PROCEDURE — 82947 ASSAY GLUCOSE BLOOD QUANT: CPT

## 2024-07-16 PROCEDURE — 2500000004 HC RX 250 GENERAL PHARMACY W/ HCPCS (ALT 636 FOR OP/ED)

## 2024-07-16 PROCEDURE — 85007 BL SMEAR W/DIFF WBC COUNT: CPT

## 2024-07-16 PROCEDURE — 74018 RADEX ABDOMEN 1 VIEW: CPT

## 2024-07-16 PROCEDURE — C9113 INJ PANTOPRAZOLE SODIUM, VIA: HCPCS

## 2024-07-16 PROCEDURE — 99291 CRITICAL CARE FIRST HOUR: CPT

## 2024-07-16 PROCEDURE — 2020000001 HC ICU ROOM DAILY

## 2024-07-16 PROCEDURE — 84100 ASSAY OF PHOSPHORUS: CPT | Performed by: NURSE PRACTITIONER

## 2024-07-16 PROCEDURE — 80053 COMPREHEN METABOLIC PANEL: CPT

## 2024-07-16 PROCEDURE — 83735 ASSAY OF MAGNESIUM: CPT

## 2024-07-16 PROCEDURE — 74018 RADEX ABDOMEN 1 VIEW: CPT | Performed by: RADIOLOGY

## 2024-07-16 RX ORDER — SODIUM CHLORIDE, SODIUM LACTATE, POTASSIUM CHLORIDE, CALCIUM CHLORIDE 600; 310; 30; 20 MG/100ML; MG/100ML; MG/100ML; MG/100ML
75 INJECTION, SOLUTION INTRAVENOUS CONTINUOUS
Status: DISCONTINUED | OUTPATIENT
Start: 2024-07-16 | End: 2024-07-16

## 2024-07-16 RX ORDER — POTASSIUM CHLORIDE 29.8 MG/ML
40 INJECTION INTRAVENOUS EVERY 6 HOURS PRN
Status: DISCONTINUED | OUTPATIENT
Start: 2024-07-16 | End: 2024-07-22

## 2024-07-16 RX ORDER — FOLIC ACID 1 MG/1
1 TABLET ORAL DAILY
Status: DISCONTINUED | OUTPATIENT
Start: 2024-07-17 | End: 2024-07-19

## 2024-07-16 RX ORDER — HYDROMORPHONE HYDROCHLORIDE 0.2 MG/ML
0.2 INJECTION INTRAMUSCULAR; INTRAVENOUS; SUBCUTANEOUS ONCE
Status: COMPLETED | OUTPATIENT
Start: 2024-07-16 | End: 2024-07-16

## 2024-07-16 RX ORDER — ACETAMINOPHEN 325 MG/1
650 TABLET ORAL EVERY 4 HOURS PRN
Status: DISCONTINUED | OUTPATIENT
Start: 2024-07-16 | End: 2024-07-19

## 2024-07-16 RX ORDER — RISPERIDONE 0.5 MG/1
3 TABLET ORAL 2 TIMES DAILY
Status: DISCONTINUED | OUTPATIENT
Start: 2024-07-16 | End: 2024-07-20

## 2024-07-16 RX ORDER — MAGNESIUM SULFATE 1 G/100ML
1 INJECTION INTRAVENOUS ONCE
Status: COMPLETED | OUTPATIENT
Start: 2024-07-16 | End: 2024-07-16

## 2024-07-16 RX ORDER — MAGNESIUM SULFATE HEPTAHYDRATE 40 MG/ML
2 INJECTION, SOLUTION INTRAVENOUS EVERY 6 HOURS PRN
Status: DISCONTINUED | OUTPATIENT
Start: 2024-07-16 | End: 2024-07-24

## 2024-07-16 RX ORDER — LANOLIN ALCOHOL/MO/W.PET/CERES
100 CREAM (GRAM) TOPICAL DAILY
Status: DISCONTINUED | OUTPATIENT
Start: 2024-07-17 | End: 2024-07-19

## 2024-07-16 RX ORDER — SERTRALINE HYDROCHLORIDE 25 MG/1
25 TABLET, FILM COATED ORAL NIGHTLY
Status: DISCONTINUED | OUTPATIENT
Start: 2024-07-16 | End: 2024-07-19

## 2024-07-16 ASSESSMENT — COGNITIVE AND FUNCTIONAL STATUS - GENERAL
CLIMB 3 TO 5 STEPS WITH RAILING: TOTAL
STANDING UP FROM CHAIR USING ARMS: TOTAL
HELP NEEDED FOR BATHING: TOTAL
WALKING IN HOSPITAL ROOM: TOTAL
WALKING IN HOSPITAL ROOM: TOTAL
TOILETING: TOTAL
DRESSING REGULAR LOWER BODY CLOTHING: TOTAL
HELP NEEDED FOR BATHING: TOTAL
DRESSING REGULAR LOWER BODY CLOTHING: TOTAL
MOBILITY SCORE: 6
MOVING TO AND FROM BED TO CHAIR: TOTAL
TURNING FROM BACK TO SIDE WHILE IN FLAT BAD: TOTAL
EATING MEALS: TOTAL
STANDING UP FROM CHAIR USING ARMS: TOTAL
DAILY ACTIVITIY SCORE: 6
MOBILITY SCORE: 6
EATING MEALS: TOTAL
CLIMB 3 TO 5 STEPS WITH RAILING: TOTAL
MOVING TO AND FROM BED TO CHAIR: TOTAL
MOVING FROM LYING ON BACK TO SITTING ON SIDE OF FLAT BED WITH BEDRAILS: TOTAL
MOVING FROM LYING ON BACK TO SITTING ON SIDE OF FLAT BED WITH BEDRAILS: TOTAL
TOILETING: TOTAL
PERSONAL GROOMING: TOTAL
PERSONAL GROOMING: TOTAL
DAILY ACTIVITIY SCORE: 6
DRESSING REGULAR UPPER BODY CLOTHING: TOTAL
DRESSING REGULAR UPPER BODY CLOTHING: TOTAL
TURNING FROM BACK TO SIDE WHILE IN FLAT BAD: TOTAL

## 2024-07-16 ASSESSMENT — PAIN - FUNCTIONAL ASSESSMENT
PAIN_FUNCTIONAL_ASSESSMENT: CPOT (CRITICAL CARE PAIN OBSERVATION TOOL)

## 2024-07-16 NOTE — PROCEDURES
Insert arterial line    Date/Time: 7/15/2024 10:24 PM    Performed by: ASIM Alba  Authorized by: ASIM Alba    Consent:     Consent obtained:  Emergent situation    Risks, benefits, and alternatives were discussed: yes      Risks discussed:  Bleeding, ischemia, repeat procedure, pain and infection  Universal protocol:     Relevant documents present and verified: yes      Test results available: yes      Site/side marked: yes      Immediately prior to procedure, a time out was called: yes      Patient identity confirmed:  Arm band  Indications:     Indications: hemodynamic monitoring    Pre-procedure details:     Skin preparation:  Chlorhexidine  Sedation:     Sedation type:  None  Anesthesia:     Anesthesia method:  Local infiltration    Local anesthetic:  Lidocaine 1% w/o epi  Procedure details:     Location:  R radial    Jeronimo's test performed: yes      Jeronimo's test abnormal: no      Needle gauge:  20 G    Placement technique:  Ultrasound guided    Number of attempts:  1    Transducer: waveform confirmed    Post-procedure details:     Post-procedure:  Biopatch applied, secured with tape and sterile dressing applied    CMS:  Unchanged and normal    Procedure completion:  Tolerated

## 2024-07-16 NOTE — CONSULTS
"   Endocrinology Initial Inpatient Consult Note     PATIENT NAME: Bing Holliday  MRN: [id number]  DATE: 7/16/2024    CONSULTING PHYSICIAN: Dr. TESSY Lambert.  REASON FOR CONSULT: AI. T2DM.      History of Presenting Illness     62y F w. PMHx of:      # Systemic Lupus Erythematous c/b Cerebritis and Jaccoud's Arthropathy      # Uncontrolled T2DM      # Hx of C Difficile Infection (5/23)      # Stage IV CKD      # Paroxysmal Atrial Fibrillation      # Heart Failure with Reduced LVEF      # Osteoporosis      # CAD s/p CABG (2013)      # GERD      # COPD      # pHTN      # HTN      # HLD    This history is taken via chart review as the patient cannot contribute to her history.  Patient presented to the emergency department from [facility] for altered mental status.  Patient reportedly was hypotensive and hypoglycemic at her skilled nursing facility.  She was also found to be hypothermic.  She was brought in for further management.  Patient's mentation has reportedly been waxing and waning per her son.    In the ER, glucose 94 9 mg/dL.  The patient's pH was low at 7.25.  Her troponin was elevated to 45.  CBC showed pancytopenia.  CMP showed a low glucose of 49 mg/dL.  Creatinine was elevated 2.41.  Patient's lactate was 1.8.  Lipase was normal.  INR was 1.  TSH was found to be 4.67.  Her free T4 is normal at 0.95.  Urinalysis was negative for nitrite but positive for leukocyte Estrace.  She did have pyuria.  Urine cultures pending.  The patient was subsequent mated to the medical intensive care unit started on IV hydrocortisone.  I was contacted by the ER staff.  She was also started on vasopressors.CT of the chest, abdomen, and pelvis did show hepatic flexure diverticulitis.      Review of Systems (Bold if Positive)     Unable to Obtain 2/2 Mental Status.      Physical Examination     BP 85/56   Pulse 94   Temp 36.6 °C (97.9 °F) (Temporal)   Resp 16   Ht 1.651 m (5' 5\")   Wt 97.5 kg (215 lb)   LMP  (LMP Unknown)   SpO2 92%  "  BMI 35.78 kg/m²   General: Tremulous.  Does not speak to me.  Will not squeeze my fingers when asked.  Cachectic.  HEENT: PERRLA. EOMi. Ø Exophthalmos.  Temporal wasting.  Neck: No LAD.  Thyroid: 20g Ø Bruit  CV: Normal rate and rhythm. No murmurs or rubs auscultated.   Resp: CTA b/l. No wheezing or crackles.   Abdomen: Generalized abdominal pain.  No rebound tenderness.  Extremities: Trace pedal edema b/l.  Ulnar deviation of the bilateral fingers.  Neuro: CN II-XII Grossly Intact.  Bilateral hand tremors. Brisk Reflexes.   Psych: Inattentive.  Skin: No apparent rashes      Past Medical / Surgical / Family / Social History     Past Medical History:   Diagnosis Date    Abnormal kidney function 04/04/2023    Acute headache 03/10/2024    Acute iritis of right eye 07/24/2015    Acute low back pain 10/30/2023    Acute upper respiratory infection, unspecified 03/04/2020    Acute URI    Acute upper respiratory infection, unspecified 09/30/2015    URTI (acute upper respiratory infection)    Arthralgia of right knee 02/14/2024    Arthritis     Atypical facial pain 03/10/2024    Body mass index (BMI) 23.0-23.9, adult 10/15/2021    BMI 23.0-23.9, adult    Body mass index (BMI) 33.0-33.9, adult 03/04/2020    BMI 33.0-33.9,adult    Cardiomegaly 08/27/2013    Left ventricular hypertrophy    Chest pain 06/10/2022    Comment on above: Added by Problem List Migration; 2013-3-12; Moved to Suppressed Nov 25 2013 9:16PM; Comment on above: Added by Problem List Migration; 2013-3-12; Moved to Suppressed Nov 25 2013 9:16PM;    Chronic kidney disease, stage 3 unspecified (Multi) 07/02/2013    Chronic kidney disease, stage III (moderate)    Confusional state 03/10/2024    Contact with and (suspected) exposure to covid-19 04/04/2023    Delirium 03/10/2024    Disease of pericardium, unspecified ([facility]) 07/02/2013    Pericardial disease    Encounter for follow-up examination after completed treatment for conditions other than malignant  neoplasm 10/06/2022    Hospital discharge follow-up    Generalized contraction of visual field, right eye 01/29/2015    Generalized contraction of visual field of right eye    Hearing loss 03/10/2024    History of cataract 03/10/2024    History of thrombocytopenia 03/10/2024    Comment on above: Added by Problem List Migration; 2013-7-2;    Homonymous bilateral field defects, right side 04/29/2016    Homonymous bilateral field defects of right side    Hypertensive chronic kidney disease with stage 1 through stage 4 chronic kidney disease, or unspecified chronic kidney disease 07/02/2013    Nephrosclerosis    Laceration without foreign body, left foot, initial encounter 07/03/2018    Foot laceration, left, initial encounter    Localized edema 03/10/2024    Localized, primary osteoarthritis 02/14/2024    Mass of skin 03/10/2024    Migraine with aura, not intractable, without status migrainosus 10/24/2022    Ocular migraine    Open wound 03/10/2024    Open wound of left foot 03/10/2024    Other conditions influencing health status 07/02/2013    Chronic Glomerulonephritis In Diseases Classified Elsewhere    Other conditions influencing health status 07/02/2013    Progressive Familial Myoclonic Epilepsy    Other conditions influencing health status 07/02/2013    Protein S Deficiency    Other conditions influencing health status 05/22/2015    Familial Combined Hyperlipidemia    Other conditions influencing health status 10/24/2022    IDDM (insulin dependent diabetes mellitus)    Other conditions influencing health status 03/14/2022    Diabetes mellitus, insulin dependent (IDDM), uncontrolled    Other long term (current) drug therapy 10/24/2022    Long-term use of Plaquenil    Overweight with body mass index (BMI) 25.0-29.9 03/10/2024    Pain of toe 03/10/2024    Personal history of COVID-19 04/04/2023    Personal history of diseases of the blood and blood-forming organs and certain disorders involving the immune mechanism  07/02/2013    History of thrombocytopenia    Personal history of diseases of the skin and subcutaneous tissue 08/11/2015    History of foot ulcer    Personal history of nephrotic syndrome 07/02/2013    History of nephrotic syndrome    Personal history of other diseases of the circulatory system 08/27/2013    History of sinus tachycardia    Personal history of other diseases of the nervous system and sense organs     History of cataract    Personal history of other diseases of the respiratory system     History of bronchitis    Personal history of other infectious and parasitic diseases 07/02/2013    History of hepatitis    Personal history of other specified conditions     History of shortness of breath    Personal history of other specified conditions 08/27/2013    History of edema    Posterior epistaxis 03/10/2024    Puckering of macula, right eye 10/24/2022    ERM OD (epiretinal membrane, right eye)    Raynaud's syndrome without gangrene 07/02/2013    Raynaud's disease    Sinusitis 03/10/2024    Systemic lupus erythematosus, unspecified (Multi) 07/24/2015    SLE (systemic lupus erythematosus)    Systemic lupus erythematosus, unspecified (Multi) 07/24/2015    SLE (systemic lupus erythematosus)    Systemic lupus erythematosus, unspecified (Multi) 07/24/2015    Systemic lupus    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) 07/02/2013    Type 2 diabetes with nephropathy    Type 2 diabetes mellitus with mild nonproliferative diabetic retinopathy without macular edema, left eye (Multi) 07/27/2015    Non-proliferative diabetic retinopathy, left eye    Type 2 diabetes mellitus with mild nonproliferative diabetic retinopathy without macular edema, unspecified eye (Multi) 07/24/2015    Mild non proliferative diabetic retinopathy    Type 2 diabetes mellitus with proliferative diabetic retinopathy without macular edema, right eye (Multi) 07/27/2015    Proliferative diabetic retinopathy of right eye    Type 2  diabetes mellitus with proliferative diabetic retinopathy without macular edema, unspecified eye (Multi) 07/24/2015    Diabetic proliferative retinopathy    Unspecified acute and subacute iridocyclitis 07/24/2015    Acute iritis, right eye    Unspecified open wound, left foot, sequela 07/03/2018    Wound, open, foot, left, sequela     Past Surgical History:   Procedure Laterality Date    ANKLE SURGERY  01/29/2015    Ankle Surgery    CARDIAC ELECTROPHYSIOLOGY PROCEDURE Left 5/17/2024    Procedure: PPM IMPLANT DUAL;  Surgeon: Antonio Rodriges MD;  Location: [facility];  Service: Electrophysiology;  Laterality: Left;  Pt. needs platelets and Prednisone prior to procedure    CHOLECYSTECTOMY  01/29/2015    Cholecystectomy    CT GUIDED PERCUTANEOUS BIOPSY BONE DEEP  5/4/2021    CT GUIDED PERCUTANEOUS BIOPSY BONE DEEP 5/4/2021 [facility] CL[facility]ICAL [facility]GAC[facility]    EYE SURGERY  03/06/2015    Eye Surgery    FOOT SURGERY  01/29/2015    Foot Surgery    MR HEAD ANGIO WO IV CONTRAST  7/26/2013    MR HEAD ANGIO WO IV CONTRAST 7/26/2013 [facility] CLINICAL LEGACY    MR HEAD ANGIO WO IV CONTRAST  9/17/2021    MR HEAD ANGIO WO IV CONTRAST 9/17/2021 [facility]    MR HEAD ANGIO WO IV CONTRAST  3/25/2023    MR HEAD ANGIO WO IV CONTRAST [facility]    MR NECK ANGIO WO IV CONTRAST  7/26/2013    MR NECK ANGIO WO IV CONTRAST 7/26/2013 [facility] CLINICAL LEGACY    MR NECK ANGIO WO IV CONTRAST  9/17/2021    MR NECK ANGIO WO IV CONTRAST 9/17/2021 [facility]    MR NECK ANGIO WO IV CONTRAST  3/25/2023    MR NECK ANGIO WO IV CONTRAST [facility]    OTHER SURGICAL HISTORY  01/29/2015    Creation Of Pericardial Window    OTHER SURGICAL HISTORY  01/29/2015    Quadricepsplasty    TOTAL HIP ARTHROPLASTY  01/29/2015    Hip Replacement     Family History   Problem Relation Name Age of Onset    Other (RENAL DISEASE) Mother          END STAGE    Other (CARDIAC DISORDER) Mother      Cataracts Mother      Stroke Mother      Diabetes Mother      Kidney disease  Mother      Lupus Mother      Other (CARDIAC DISORDER) Father      COPD Father      Glaucoma Father      Hypertension Father      Sleep apnea Father      Other (CARDIAC DISORDER) Sister      Depression Sister      Kidney disease Sister      Sickle cell trait Sister      Sleep apnea Daughter       Social History     Socioeconomic History    Marital status:      Spouse name: Not on file    Number of children: Not on file    Years of education: Not on file    Highest education level: Not on file   Occupational History    Not on file   Tobacco Use    Smoking status: Never     Passive exposure: Never    Smokeless tobacco: Never   Vaping Use    Vaping status: Never Used   Substance and Sexual Activity    Alcohol use: Not Currently     Comment: RARE    Drug use: Never    Sexual activity: Defer   Other Topics Concern    Not on file   Social History Narrative    Not on file     Social Determinants of Health     Financial Resource Strain: Patient Unable To Answer (7/15/2024)    Overall Financial Resource Strain (CARDIA)     Difficulty of Paying Living Expenses: Patient unable to answer   Food Insecurity: Patient Unable To Answer (7/15/2024)    Hunger Vital Sign     Worried About Running Out of Food in the Last Year: Patient unable to answer     Ran Out of Food in the Last Year: Patient unable to answer   Transportation Needs: Patient Unable To Answer (7/15/2024)    PRAPARE - Transportation     Lack of Transportation (Medical): Patient unable to answer     Lack of Transportation (Non-Medical): Patient unable to answer   Physical Activity: Inactive (7/15/2024)    Exercise Vital Sign     Days of Exercise per Week: 0 days     Minutes of Exercise per Session: 0 min   Stress: Patient Unable To Answer (7/15/2024)    [employer] [place] [employer] - Occupational Stress Questionnaire     Feeling of Stress : Patient unable to answer   Social Connections: Patient Unable To Answer (7/15/2024)    Social Connection and  Isolation Panel [NHANES]     Frequency of Communication with Friends and Family: Patient unable to answer     Frequency of Social Gatherings with Friends and Family: Patient unable to answer     Attends [employer]ze Services: Patient unable to answer     Active Member of Clubs or Organizations: Patient unable to answer     Attends Club or Organization Meetings: Patient unable to answer     Marital Status: Patient unable to answer   Intimate Partner Violence: Patient Unable To Answer (7/15/2024)    Humiliation, Afraid, Rape, and Kick questionnaire     Fear of Current or Ex-Partner: Patient unable to answer     Emotionally Abused: Patient unable to answer     Physically Abused: Patient unable to answer     Sexually Abused: Patient unable to answer   Housing Stability: Patient Unable To Answer (7/15/2024)    Housing Stability Vital Sign     Unable to Pay for Housing in the Last Year: Patient unable to answer     Number of Times Moved in the Last Year: 1     Homeless in the Last Year: Patient unable to answer   Recent Concern: Housing Stability - High Risk (6/4/2024)    Housing Stability Vital Sign     Unable to Pay for Housing in the Last Year: No     Number of Places Lived in the Last Year: 3     Unstable Housing in the Last Year: No       Medications & Allergies     MAR and PTA Meds Reviewed     Allergies   Allergen Reactions    Ace Inhibitors Shortness of breath, Swelling, Other and Angioedema     Facial droop    Hydroxychloroquine Other and Nausea/vomiting     Retinal Bleeding    Lisinopril Swelling    Sulfa (Sulfonamide Antibiotics) Hives       Data     Recent Labs and Imaging Reviewed      Assessment / Plan       # Adrenal Insufficiency  I have taken care of this patient greater than 6 times in the last 3 months.  She does quite well in the hospital when she is started on steroids.  When she gets discharged she clinically deteriorates and anisa presents with the same presenting symptoms.  I believe most of the  symptoms are secondary to hypoadrenalism.  This begs the question if she is receiving her medications at the ECF that she goes to.  I did review the medication record of the ECF that was in the chart.  The patient is adequately maintained on hydrocortisone 20 mg in the morning and 10 mg in the afternoon.  This appears to be correct on their MAR.  I am unclear if she is taking these pills.    Admitted to the ICU with encephalopathy and sepsis in the setting of diverticulitis.  Again per my previous consult note this patient has secondary to renal insufficiency.  I am unclear if she was getting her steroids at the ECF.  I do think that we should obtain the medication administration record from the ECF to make sure she is getting her glucocorticoids.  Her presenting labs and vitals are consistent with adrenal crisis.  She has multiple readmissions.  For now continue IV hydrocortisone 50 mg every 8 hours given that she is in the ICU on vasopressors and a sick status.    Dietary has reached out to me and I definitely appreciate their help.  There is concern from staff at the ECF that the patient is pocketing pills and not taking them.  I am unclear if there is a secondary gain or not.  Nonetheless given the amount of readmissions this patient has-many requiring ICU admission with vasopressors, we need to figure out what is happening on discharge.    # Hypoglycemia  # Uncontrolled Type 2 Diabetes Mellitus  Per my many notes in the past.  Patient present with hypoglycemia.  This is likely in the setting of adrenal crisis.  Continue to check her sugars with meals and at bedtime's.    # Pancytopenia  In the setting of sepsis.  The patient is hypoalbuminemia.  Is this nutritional?  Is there a malignant process ongoing?  Query need for bone marrow biopsy.    # Abnormal TFTs  I have reviewed the patient's TSH values for the last 5 years.  She has been more time in the hospital than outside of the hospital during this time.  She  has multiple TSH values which I suspect have been ordered due to encephalopathy.  She does not have any TSH values greater than 10.  On admission it was in the 4 range.  I do think this is secondary to nonthyroidal illness.  Her free T4 is normal which is reassuring.  I recommend no intervention on her TSH value.    # Osteoporosis  In the setting of chronic glucocorticoid use. This will be further managed as an outpatient. She probably would benefit from a bisphosphonate, these agents are best studied with steroid-induced adynamic bone disease.       Avi Sloan, DO  Endocrinology, Diabetes, and Metabolism    Available via EP[employer] Messenger    Please excuse and typographical or unwanted errors within this documentation as voice recognition software was used to dictate this note.

## 2024-07-16 NOTE — CARE PLAN
The patient's goals for the shift include  Patient to remain safe and free from injury with pain and comfort managed and maintained throughout the shift    The clinical goals for the shift include Patient to remain hemodynamically stable throughout the shift    Over the shift, the patient did not make progress toward the following goals. Barriers to progression include impaired mobility, impaired skin integrity, weakness, pain, poor nutritional intake, and confusion. Recommendations to address these barriers include   Problem: Skin  Goal: Decreased wound size/increased tissue granulation at next dressing change  Flowsheets (Taken 7/16/2024 0056)  Decreased wound size/increased tissue granulation at next dressing change:   Promote sleep for wound healing   Protective dressings over bony prominences  Goal: Participates in plan/prevention/treatment measures  Flowsheets (Taken 7/16/2024 0056)  Participates in plan/prevention/treatment measures:   Discuss with provider PT/OT consult   Elevate heels  Goal: Prevent/manage excess moisture  Flowsheets (Taken 7/16/2024 0056)  Prevent/manage excess moisture:   Cleanse incontinence/protect with barrier cream   Monitor for/manage infection if present   Follow provider orders for dressing changes   Use wicking fabric (obtain order)  Goal: Prevent/minimize sheer/friction injuries  Flowsheets (Taken 7/16/2024 0056)  Prevent/minimize sheer/friction injuries:   Complete micro-shifts as needed if patient unable. Adjust patient position to relieve pressure points, not a full turn   Increase activity/out of bed for meals   Use pull sheet   HOB 30 degrees or less   Turn/reposition every 2 hours/use positioning/transfer devices  Goal: Promote/optimize nutrition  Flowsheets (Taken 7/16/2024 0056)  Promote/optimize nutrition:   Assist with feeding   Offer water/supplements/favorite foods   Consume > 50% meals/supplements   Reassess MST if dietician not consulted  Goal: Promote skin  healing  Flowsheets (Taken 7/16/2024 0056)  Promote skin healing:   Assess skin/pad under line(s)/device(s)   Protective dressings over bony prominences   Turn/reposition every 2 hours/use positioning/transfer devices   Ensure correct size (line/device) and apply per  instructions   .

## 2024-07-16 NOTE — PROGRESS NOTES
Per palliative note, plan to meet with patient and son and discuss hospice services. TCC to follow.

## 2024-07-16 NOTE — PROGRESS NOTES
"Nutrition Initial Assessment:   Nutrition Assessment    Reason for Assessment: Provider consult order    Patient History: Bing Holliday is a 63 y.o. female presenting to ED and admitted to ICU due to hypotension, hypoglycemia, and hypothermia. CT showing small right effusion, small ascites, and diverticulitis.  GI consulted to evaluate noting uncomplicated diverticulitis and recommending atb and outpatient colonoscopy pending GOC.    Most recent admit here was from 6/28-7/9 for AMS and abnormal labs with multifactorial sepsis and adrenal insufficiency.  She has been admitted every month since March 2024.  She has been to a number of nursing facilities and prior to last admit she was at [facility] and continues to get SNF care there.    Past Medical History: SLE, lupus cerebritis, RA, Raynaud's syndrome, hyperparathyroidism, adrenal insufficiency, COPD, DM, HTN, HLD, atrial fibrillation (not on anticoagulation), CKD, pacemaker, seizures, psychosis     Nutrition History:  Energy Intake:  (NPO)  Food and Nutrient History: Pt unable to provide information so far this admit.  No family present.  Pt had been fed by corpak last admission however this was removed as she was tolerating a PO diet.  Diet at SNF was LCS, regular texture per orders.  Spoke with nurse from SNF who noted that pt has not eaten much whatsoever since the day she was admitted and often holds meds in her mouth for 15-20min before swallowing and overall has difficulty swallowing with meds and food.   Food Allergies/Intolerances:  None  GI Symptoms: Abdominal pain  Oral Problems: Chewing difficulty and Swallowing difficulty       Current Diet: NPO Diet; Effective now    Anthropometrics:  Height: 165.1 cm (5' 5\")   Weight: 97.5 kg (215 lb)   BMI (Calculated): 35.78             Weight Change %:  Weight History / % Weight Change: Weight history shows fluctuation probably in relation to edema and variable PO intakes. Last 6 months wt range " has been 190-224 lbs per chart.  Significant Weight Loss: Yes  07/05/24 96 kg (211 lb 10.3 oz)   06/26/24 95.7 kg (211 lb)   06/21/24 95.7 kg (211 lb)   06/04/24 95.3 kg (210 lb)   05/13/24 102 kg (224 lb 13.9 oz)   04/24/24 90.9 kg (200 lb 6.4 oz)   03/19/24 96.5 kg (212 lb 11.9 oz)   03/11/24 96.5 kg (212 lb 11.2 oz)   02/14/24 86.2 kg (190 lb)   02/08/24 87.5 kg (193 lb)   01/20/24 99.3 kg (218 lb 14.7 oz)   01/17/24 96 kg (211 lb 10.3 oz)       Pain Assessment: CPOT  0-10 (Numeric) Pain Score: 0 - No pain      Nutrition Significant Labs:  BMP Trend:   Results from last 7 days   Lab Units 07/16/24  0252 07/15/24  1114   GLUCOSE mg/dL 129* 49*   CALCIUM mg/dL 7.9* 8.0*   SODIUM mmol/L 142 144   POTASSIUM mmol/L 4.5 4.4   CO2 mmol/L 20* 22   CHLORIDE mmol/L 112* 113*   BUN mg/dL 26* 28*   CREATININE mg/dL 2.32* 2.41*    , A1C:  Lab Results   Component Value Date    HGBA1C 6.8 (H) 06/04/2024       Nutrition Specific Medications:  Scheduled medications  [Held by provider] atorvastatin, 40 mg, oral, Nightly  budesonide, 0.5 mg, nebulization, BID  docusate sodium, 100 mg, oral, BID  folic acid, 1 mg, oral, Daily  formoterol, 20 mcg, nebulization, BID  heparin (porcine), 5,000 Units, subcutaneous, q8h  hydrocortisone sodium succinate, 50 mg, intravenous, q8h  insulin lispro, 0-5 Units, subcutaneous, q4h  ipratropium-albuteroL, 3 mL, nebulization, TID  levETIRAcetam, 500 mg, intravenous, q12h  melatonin, 5 mg, oral, Nightly  [Held by provider] mycophenolate, 1,000 mg, oral, BID  pantoprazole, 40 mg, intravenous, Daily  piperacillin-tazobactam, 4.5 g, intravenous, q8h  polyethylene glycol, 17 g, oral, Daily  [Held by provider] risperiDONE, 3 mg, oral, BID  [Held by provider] sertraline, 25 mg, oral, Nightly  thiamine, 100 mg, oral, Daily      Continuous medications  lactated Ringer's, 75 mL/hr, Last Rate: 75 mL/hr (07/16/24 0924)  norepinephrine, 0-0.2 mcg/kg/min, Last Rate: 0.02 mcg/kg/min (07/16/24  1020)        Nutrition Focused Physical Exam Findings:  Subcutaneous Fat Loss:   Orbital Fat Pads: Mild-Moderate (slight dark circles and slight hollowing)  Buccal Fat Pads: Mild-Moderate (flat cheeks, minimal bounce)  Triceps: Defer  Muscle Wasting:  Temporalis: Mild-Moderate (slight depression)  Pectoralis (Clavicular Region): Mild-Moderate (some protrusion of clavicle)  Deltoid/Trapezius: Mild-Moderate (slight protrusion of acromion process)  Interosseous: Defer  Trapezius/Infraspinatus/Supraspinatus (Scapular Region): Defer  Quadriceps: Defer  Gastrocnemius: Defer  Edema:  Edema: none  Physical Findings:  Hair: Negative  Eyes: Negative  Mouth: Negative  Nails: Negative  Skin: Negative    I/O:   Last BM Date: 07/16/24; Stool Appearance: Loose (07/16/24 1000)     Estimated Needs:   Total Energy Estimated Needs (kCal):  (2002 kcal (MSJ x 1.1 x 1.2) or 22.8 kcal/kg)  Total Protein Estimated Needs (g):  (88-105g protein (1.0-1.2 g/kg))  Total Fluid Estimated Needs (mL):  (1mL/kcal/d or as per physician)          Nutrition Diagnosis   Malnutrition Diagnosis  Patient has Malnutrition Diagnosis: Yes  Diagnosis Status: New  Malnutrition Diagnosis: Moderate malnutrition related to chronic disease or condition  As Evidenced by: acute on chronic nutritional stres with indicators including NFPE showing moderate muscle wasting/fat loss, unintentional weight loss, and intakes meeting <75% of needs for > 1 month.            Nutrition Interventions/Recommendations         Nutrition Prescription:  Individualized Nutrition Prescription Provided for : Continue NPO status, discussed with care team plan to hold off on tube feeding while family discusses with palliative goals of care.  Discussed with team that if pt is to maintain any sort of consistent nutrition status that she would likely need PEG as corpak is not a long term solution to her nutrition status.        Nutrition Interventions:   Food and/or Nutrient Delivery  Interventions  Interventions: Enteral intake  Enteral Intake: Other (Comment)  Goal: If EN is desired and PEG no decided on would suggets bridled corpak placement with initiation of Osmolite 1.5 at 10ml/hr advancing to a goal rate of 55ml/hr to provide 1980kcal, 83g pro, and 1003ml; suggest water flush of 165ml 6x/day to provide total fluid volume of 1993ml    Coordination of Nutrition Care by a Nutrition Professional  Collaboration and Referral of Nutrition Care: Collaboration by nutrition professional with other providers  Goal: AMINAH Diaz, Avi Sloan DO, Jaziel Tyler DO       Nutrition Monitoring and Evaluation   Food/Nutrient Related History Monitoring  Monitoring and Evaluation Plan: Enteral and parenteral nutrition intake  Enteral and Parenteral Nutrition Intake: Enteral nutrition intake  Criteria: Pt will tolerate goal rate within 24-48hrs once initiated         Biochemical Data, Medical Tests and Procedures  Monitoring and Evaluation Plan: Electrolyte/renal panel  Electrolyte and Renal Panel: Phosphorus, Magnesium, Chloride, Sodium, BUN  Criteria: Maintain within acceptable range            Time Spent/Follow-up Reminder:   Time Spent (min): 60 minutes  Last Date of Nutrition Visit: 07/16/24  Nutrition Follow-Up Needed?: 3-5 days  Follow up Comment: LUANNE ANDERSON recs in - palliative discussion [facility]

## 2024-07-16 NOTE — PROGRESS NOTES
Critical Care Daily Progress        Subjective   Patient is a 63 y.o. female admitted on 7/15/2024 10:13 AM to the ICU for shock secondary to adrenal sufficiency and/or sepsis.    Overnight Events:   Arterial line placed overnight.  No events otherwise.    Examined at bedside this morning.  She is still confused. Nutrition team reached out to the SNF, patient has been pocketing pills in her mouth and it is questionable if she has been taking the medications prescribed to her.    Scheduled Medications:   [Held by provider] atorvastatin, 40 mg, oral, Nightly  budesonide, 0.5 mg, nebulization, BID  docusate sodium, 100 mg, oral, BID  folic acid, 1 mg, oral, Daily  formoterol, 20 mcg, nebulization, BID  heparin (porcine), 5,000 Units, subcutaneous, q8h  hydrocortisone sodium succinate, 50 mg, intravenous, q8h  insulin lispro, 0-5 Units, subcutaneous, q4h  ipratropium-albuteroL, 3 mL, nebulization, TID  levETIRAcetam, 500 mg, intravenous, q12h  linezolid, 600 mg, intravenous, q12h  melatonin, 5 mg, oral, Nightly  [Held by provider] mycophenolate, 1,000 mg, oral, BID  pantoprazole, 40 mg, intravenous, Daily  piperacillin-tazobactam, 4.5 g, intravenous, q8h  polyethylene glycol, 17 g, oral, Daily  [Held by provider] risperiDONE, 3 mg, oral, BID  [Held by provider] sertraline, 25 mg, oral, Nightly  thiamine, 100 mg, oral, Daily         Continuous Medications:   lactated Ringer's, 75 mL/hr, Last Rate: 75 mL/hr (07/16/24 0700)  norepinephrine, 0-0.2 mcg/kg/min, Last Rate: 0.02 mcg/kg/min (07/16/24 0700)         PRN Medications:   PRN medications: acetaminophen, alteplase, dextrose, dextrose, glucagon, glucagon, ipratropium-albuteroL, magnesium sulfate, potassium chloride    Objective   Vitals:  Temp  Min: 32.6 °C (90.7 °F)  Max: 36.6 °C (97.9 °F)  Pulse  Min: 64  Max: 114  BP  Min: 40/20  Max: 140/74  Resp  Min: 13  Max: 57  SpO2  Min: 77 %  Max: 100 %    Physical Exam:   Physical Exam   Physical Exam  Vitals and nursing note  reviewed.   Constitutional:       Appearance: She is well-developed. She is ill-appearing.   HENT:      Head: Normocephalic and atraumatic.      Right Ear: External ear normal.      Left Ear: External ear normal.      Nose: Nose normal.      Mouth/Throat:      Mouth: Mucous membranes are dry.   Eyes:      General: No scleral icterus.     Extraocular Movements: Extraocular movements intact.      Conjunctiva/sclera: Conjunctivae normal.      Pupils: Pupils are equal, round, and reactive to light.   Cardiovascular:      Rate and Rhythm: Regular rhythm. Tachycardia present.      Heart sounds: No murmur heard.  Pulmonary:      Effort: Pulmonary effort is normal. No respiratory distress.      Breath sounds: Normal breath sounds.   Abdominal:      Palpations: Abdomen is soft.      Tenderness: There is no abdominal tenderness.   Musculoskeletal:         General: No swelling.      Cervical back: Neck supple.   Skin:     General: Skin is warm and dry.      Comments: Old, closed skin wounds on lower legs bilaterally. Do not appear infected.   Neurological:      Mental Status: She is alert. She is disoriented.      Comments: Withdraws and moans to noxious stimuli.                Assessment/Plan   Overall assessment:  Patient is a 63-year-old female admitted to the ICU for shock, suspect secondary to adrenal insufficiency but sepsis from diverticulitis also within the differential     Neuro: Encephalopathy secondary to hypoperfusion, sepsis  - History: Lupus cerebritis  - Home meds: Continue home Keppra.  Holding home sertraline, risperidone until mentation improves  - [facility] ICU    Cardiovascular: Shock secondary to adrenal insufficiency and/or sepsis  - History: Atrial fibrillation no longer on anticoagulation, pacemaker, hypertension, hyperlipidemia  - Goals: MAP> 65  - Home meds: Resume home atorvastatin  - Last echo: LVEF 40 to 45% (5/2024)  -Norepinephrine drip    Pulmonary:       - History: COPD  - Home meds: Continue DuoNeb  and Pulmicort as needed for wheezing  Continuous pulse oximetry   O2 PRN to maintain SpO2 > 94%, wean as tolerated  - Imaging: CT chest shows scattered inflammatory pulmonary nodules.  No consolidation.    Gastrointestinal: Diverticulitis at the hepatic flexure   Results from last 7 days   Lab Units 07/16/24  0252 07/15/24  1114   INR   --  1.0   ALK PHOS U/L 177* 134   AST U/L 20 34   ALT U/L 25 27   LIPASE U/L  --  21       - Diet: NPO for now given diverticulitis  - GI on consult for diverticulitis. Continue supportive care. Outpatient colonoscopy in approximately six weeks if appropriate for goals of care.  - Will discuss NG tube placement for meds and possibility of PEG tube long term if within goals of care.  - Prophylaxis: Continue home Protonix  - Bowel regimen: None  - Last BM: Last BM Date: 07/16/24    Renal:  Acute kidney injury on chronic kidney disease, suspect secondary to hypoperfusion/hypovolemia   Results from last 7 days   Lab Units 07/16/24  0252 07/15/24  1301 07/15/24  1114   SODIUM mmol/L 142  --  144   POTASSIUM mmol/L 4.5  --  4.4   MAGNESIUM mg/dL 1.87 1.56* 1.70   PHOSPHORUS mg/dL 4.3  --   --    BUN mg/dL 26*  --  28*   CREATININE mg/dL 2.32*  --  2.41*       Net IO Since Admission: 2,166.63 mL [07/16/24 0818]  - Daily CMP,Mg,Phos  - History: CKD, baseline creatinine 1.4  - [place]ano with strict I/O   -Clinically appears hypovolemic, hydrate as necessary  - Electrolytes: Replete per protocol, goal K>4 Phos >3 Mg >2    Endocrine: Adrenal insufficiency  Results from last 7 days   Lab Units 07/16/24  0746 07/16/24  0337 07/16/24  0252 07/15/24  2337 07/15/24  1953 07/15/24  1639 07/15/24  1350 07/15/24  1134 07/15/24  1114   POCT GLUCOSE mg/dL 150* 135*  --  123* 125* 151* 102*   < >  --    GLUCOSE mg/dL  --   --  129*  --   --   --   --   --  49*   TSH mIU/L  --   --   --   --   --   --   --   --  4.67*    < > = values in this interval not displayed.      -History: Adrenocortical insufficiency,  diabetes  -Home meds: Holding Florinef and hydrocortisone, administering Solu-Cortef  -Endocrinology on consult, Solucortef 50mg q8h  -sliding scale: Insulin lispro  -BG < 180 goal    Rheumatology:  -History: Rheumatoid arthritis  -Holding mycophenolate due to risk of bone marrow suppression    Hematology:  Results from last 7 days   Lab Units 24  0252 07/15/24  1114   HEMOGLOBIN g/dL 8.1* 8.4*   HEMATOCRIT % 27.4* 28.1*   PLATELETS AUTO x10*3/uL 84* 68*     - Daily CBC  -History: Chronic thrombocytopenia  - DVT Prophylaxis: Heparin, in setting of JED on CKD    Infectious Disease: Diverticulitis  Results from last 7 days   Lab Units 24  0252 07/15/24  1114   WBC AUTO x10*3/uL 2.8* 2.4*     Temp (24hrs), Av.4 °C (95.8 °F), Min:32.6 °C (90.7 °F), Max:36.6 °C (97.9 °F)     -Cultures: Blood and urine pending  -Hx E. Faecium UTI, will add antibiotics if necessary once cultures return.  -Abx: Zosyn for diverticulitis.    Musculoskeletal:   - PT to see   - OT to see       LDA:  CVC 07/15/24 Triple lumen Left Internal jugular (Active)   Placement Date/Time: 07/15/24 1500   Hand Hygiene Performed Prior to CVC Insertion: Yes  Site Prep: Chlorhexidine   Site Prep Agent has Completely Dried Before Insertion: Yes  All 5 Sterile Barriers Used (Gloves, Gown, Cap, Mask, Large Sterile Drape):...   Number of days: 0       Arterial Line 07/15/24 Right Radial (Active)   Placement Date/Time: 07/15/24 (c) 1914   Size: 20 G  Orientation: Right  Location: Radial  Site Prep: Chlorhexidine   Local Anesthetic: Injectable  Insertion attempts: 1  Securement Method: Transparent dressing;Taped   Number of days: 0       Urethral Catheter (Active)   Placement Date/Time: 07/15/24 1300     Number of days: 0         CODE STATUS: DNR and No Intubation    Disposition: Patient to remain in the ICU for continued vasopressor support.    RESTRAINTS: type: None    ABCDEF Checklist  Analgesia: Spontaneous awakening trial to be pursued if  clinically appropriate. RASS goal reviewed  Breathing: Spontaneous breathing trial to be pursued if clinically appropriate. Mechanical power of assisted ventilation reviewed  Choice of analgesia/sedation: Analgesic and sedative agents adjusted per clinical context.   Delirium assessed by CAM, will avoid exacerbating factors  Early mobility and exercise: Physical and occupational therapy engaged  Family: Plan of care, overall trajectory of patient shared with family. Questions elicited and answered as appropriate.     Due to the high probability of life threatening clinical decompensation, the patient required critical care time evaluating and managing this patient.  Critical care time included obtaining a history, examining the patient, ordering and reviewing studies, discussing, developing, and implementing a management plan, evaluating the patient's response to treatment, and discussion with other care team providers. I saw and evaluated the patient myself.  Critical care time was performed exclusive of billable procedures.      Case discussed with attending , Dr. Fausto Tyler DO

## 2024-07-16 NOTE — CONSULTS
Reason For Consult    Goals of care, chronically ill with frequent hospitalizations   History Of Present Illness  Bing Holliday is a 63 y.o. female presenting with shock suspected secondary to adrenal insufficiency.   .     Past Medical History  She has a past medical history of Abnormal kidney function (04/04/2023), Acute headache (03/10/2024), Acute iritis of right eye (07/24/2015), Acute low back pain (10/30/2023), Acute upper respiratory infection, unspecified (03/04/2020), Acute upper respiratory infection, unspecified (09/30/2015), Arthralgia of right knee (02/14/2024), Arthritis, Atypical facial pain (03/10/2024), Body mass index (BMI) 23.0-23.9, adult (10/15/2021), Body mass index (BMI) 33.0-33.9, adult (03/04/2020), Cardiomegaly (08/27/2013), Chest pain (06/10/2022), Chronic kidney disease, stage 3 unspecified (Multi) (07/02/2013), Confusional state (03/10/2024), Contact with and (suspected) exposure to covid-19 (04/04/2023), Delirium (03/10/2024), Disease of pericardium, unspecified ([facility]) (07/02/2013), Encounter for follow-up examination after completed treatment for conditions other than malignant neoplasm (10/06/2022), Generalized contraction of visual field, right eye (01/29/2015), Hearing loss (03/10/2024), History of cataract (03/10/2024), History of thrombocytopenia (03/10/2024), Homonymous bilateral field defects, right side (04/29/2016), Hypertensive chronic kidney disease with stage 1 through stage 4 chronic kidney disease, or unspecified chronic kidney disease (07/02/2013), Laceration without foreign body, left foot, initial encounter (07/03/2018), Localized edema (03/10/2024), Localized, primary osteoarthritis (02/14/2024), Mass of skin (03/10/2024), Migraine with aura, not intractable, without status migrainosus (10/24/2022), Open wound (03/10/2024), Open wound of left foot (03/10/2024), Other conditions influencing health status (07/02/2013), Other conditions influencing health status  (07/02/2013), Other conditions influencing health status (07/02/2013), Other conditions influencing health status (05/22/2015), Other conditions influencing health status (10/24/2022), Other conditions influencing health status (03/14/2022), Other long term (current) drug therapy (10/24/2022), Overweight with body mass index (BMI) 25.0-29.9 (03/10/2024), Pain of toe (03/10/2024), Personal history of COVID-19 (04/04/2023), Personal history of diseases of the blood and blood-forming organs and certain disorders involving the immune mechanism (07/02/2013), Personal history of diseases of the skin and subcutaneous tissue (08/11/2015), Personal history of nephrotic syndrome (07/02/2013), Personal history of other diseases of the circulatory system (08/27/2013), Personal history of other diseases of the nervous system and sense organs, Personal history of other diseases of the respiratory system, Personal history of other infectious and parasitic diseases (07/02/2013), Personal history of other specified conditions, Personal history of other specified conditions (08/27/2013), Posterior epistaxis (03/10/2024), Puckering of macula, right eye (10/24/2022), Raynaud's syndrome without gangrene (07/02/2013), Sinusitis (03/10/2024), Systemic lupus erythematosus, unspecified (Multi) (07/24/2015), Systemic lupus erythematosus, unspecified (Multi) (07/24/2015), Systemic lupus erythematosus, unspecified (Multi) (07/24/2015), 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) (07/02/2013), Type 2 diabetes mellitus with mild nonproliferative diabetic retinopathy without macular edema, left eye (Multi) (07/27/2015), Type 2 diabetes mellitus with mild nonproliferative diabetic retinopathy without macular edema, unspecified eye (Multi) (07/24/2015), Type 2 diabetes mellitus with proliferative diabetic retinopathy without macular edema, right eye (Multi) (07/27/2015), Type 2 diabetes mellitus with proliferative diabetic  "retinopathy without macular edema, unspecified eye (Multi) (07/24/2015), Unspecified acute and subacute iridocyclitis (07/24/2015), and Unspecified open wound, left foot, sequela (07/03/2018).    Surgical History  She has a past surgical history that includes Eye surgery (03/06/2015); Total hip arthroplasty (01/29/2015); Cholecystectomy (01/29/2015); Other surgical history (01/29/2015); Other surgical history (01/29/2015); Ankle surgery (01/29/2015); Foot surgery (01/29/2015); MR angio head wo IV contrast (7/26/2013); MR angio neck wo IV contrast (7/26/2013); CT guided percutaneous biopsy bone deep (5/4/2021); MR angio head wo IV contrast (9/17/2021); MR angio neck wo IV contrast (9/17/2021); MR angio neck wo IV contrast (3/25/2023); MR angio head wo IV contrast (3/25/2023); and Cardiac electrophysiology procedure (Left, 5/17/2024).     Social History  She reports that she has never smoked. She has never been exposed to tobacco smoke. She has never used smokeless tobacco. She reports that she does not currently use alcohol. She reports that she does not use drugs.    Family History  Family History   Problem Relation Name Age of Onset    Other (RENAL DISEASE) Mother          END STAGE    Other (CARDIAC DISORDER) Mother      Cataracts Mother      Stroke Mother      Diabetes Mother      Kidney disease Mother      Lupus Mother      Other (CARDIAC DISORDER) Father      COPD Father      Glaucoma Father      Hypertension Father      Sleep apnea Father      Other (CARDIAC DISORDER) Sister      Depression Sister      Kidney disease Sister      Sickle cell trait Sister      Sleep apnea Daughter          Allergies  Ace inhibitors, Hydroxychloroquine, Lisinopril, and Sulfa (sulfonamide antibiotics)          Last Recorded Vitals  Blood pressure 85/56, pulse 98, temperature 35.9 °C (96.6 °F), temperature source Temporal, resp. rate 17, height 1.651 m (5' 5\"), weight 97.5 kg (215 lb), SpO2 93%.         Assessment/Plan     Pt very " familiar to Palliative Care team from previous admissions. Pt 's code status is Full Code. Pts HPOA is son ashlee Lang . Pt with extensive pmhx and frequent hospitalzations. Pt from LTC at SNF [facility], [facility]. Plan to discuss goals of care with pts son and introduce Hospice transition .     5:45p  Spoke with HPOA at length by phone. At this time he is not ready to transition pt to Hospice care. He would like to continue with current plan of aggressive care, he is agreeable to Corpak and PEG if needed. He would like to know the cause of pts AMS and swallowing problems, he also states pt voice has weakened recently. He is agreeable for Palliative Care to continue to follow. Per son plan remains return to [facility] with [facility] following. Return SNF referral to [facility] placed.     Tsering Moreira LCSW

## 2024-07-16 NOTE — PROGRESS NOTES
Physical Therapy                 Therapy Communication Note    Patient Name: Bing Holliday  MRN: [id number]  Today's Date: 7/16/2024     Discipline: Physical Therapy    Missed Visit Reason:      Missed Time: Attempt    Comment: PT orders received, chart reviewed. Attempted PT eval, pt medically unstable at this time for safe mobilizations. Will re-attempt PT eval at a later time.

## 2024-07-16 NOTE — NURSING NOTE
"1745: Patient medicated for pain with IV dilaudid    1755: This nurse attempted to place corpak. While attempting patient continues to aggressively shake her head and yell \"no, no, no no, stop, stop stop\". After many attempts to reassure patient and explain importance of tube patient continues to be agitated and aggressive. Dr. Tyler made aware of unsuccessful attempt and asked for some form of sedation to make tube placement more comfortable.  will discuss with night team and night shift nurse will place feeding tube.   "

## 2024-07-16 NOTE — PROCEDURES
Insert arterial line    Date/Time: 7/15/2024 10:21 PM    Performed by: ASIM Alba  Authorized by: ASIM Alba    Consent:     Consent obtained:  Emergent situation    Risks, benefits, and alternatives were discussed: yes      Risks discussed:  Bleeding, ischemia, repeat procedure, pain and infection  Indications:     Indications: hemodynamic monitoring    Pre-procedure details:     Skin preparation:  Chlorhexidine  Sedation:     Sedation type:  None  Anesthesia:     Anesthesia method:  Local infiltration    Local anesthetic:  Lidocaine 1% w/o epi  Procedure details:     Location:  R radial    Jeronimo's test performed: yes      Jeronimo's test abnormal: no      Needle gauge:  22 G    Placement technique:  Ultrasound guided    Number of attempts:  1    Transducer: waveform confirmed    Post-procedure details:     Post-procedure:  Biopatch applied, secured with tape and sterile dressing applied    CMS:  Unchanged    Procedure completion:  Tolerated           Skin warm and dry, steady gait, ambulated down novak

## 2024-07-17 ENCOUNTER — APPOINTMENT (OUTPATIENT)
Dept: RADIOLOGY | Facility: HOSPITAL | Age: 64
DRG: 871 | End: 2024-07-17
Payer: MEDICARE

## 2024-07-17 LAB
ABO GROUP (TYPE) IN BLOOD: NORMAL
ABO GROUP (TYPE) IN BLOOD: NORMAL
ALBUMIN SERPL BCP-MCNC: 2.4 G/DL (ref 3.4–5)
ALBUMIN SERPL BCP-MCNC: 2.4 G/DL (ref 3.4–5)
ANION GAP SERPL CALC-SCNC: 14 MMOL/L (ref 10–20)
ANION GAP SERPL CALC-SCNC: 14 MMOL/L (ref 10–20)
ANTIBODY SCREEN: NORMAL
ANTIBODY SCREEN: NORMAL
BASOPHILS # BLD MANUAL: 0 X10*3/UL (ref 0–0.1)
BASOPHILS NFR BLD MANUAL: 0 %
BLOOD EXPIRATION DATE: NORMAL
BUN SERPL-MCNC: 31 MG/DL (ref 6–23)
BUN SERPL-MCNC: 31 MG/DL (ref 6–23)
C COLI+JEJ+UPSA DNA STL QL NAA+PROBE: NOT DETECTED
C DIF TOX TCDA+TCDB STL QL NAA+PROBE: NOT DETECTED
CALCIUM SERPL-MCNC: 7.5 MG/DL (ref 8.6–10.3)
CALCIUM SERPL-MCNC: 7.7 MG/DL (ref 8.6–10.3)
CHLORIDE SERPL-SCNC: 110 MMOL/L (ref 98–107)
CHLORIDE SERPL-SCNC: 111 MMOL/L (ref 98–107)
CHLORIDE UR-SCNC: 17 MMOL/L
CHLORIDE/CREATININE (MMOL/G) IN URINE: 15 MMOL/G CREAT (ref 38–318)
CO2 SERPL-SCNC: 21 MMOL/L (ref 21–32)
CO2 SERPL-SCNC: 21 MMOL/L (ref 21–32)
CREAT SERPL-MCNC: 2.58 MG/DL (ref 0.5–1.05)
CREAT SERPL-MCNC: 2.62 MG/DL (ref 0.5–1.05)
CREAT UR-MCNC: 109.5 MG/DL (ref 20–320)
CREAT UR-MCNC: 110.2 MG/DL (ref 20–320)
CRP SERPL-MCNC: 4.19 MG/DL
DISPENSE STATUS: NORMAL
EC STX1 GENE STL QL NAA+PROBE: NOT DETECTED
EC STX2 GENE STL QL NAA+PROBE: NOT DETECTED
EGFRCR SERPLBLD CKD-EPI 2021: 20 ML/MIN/1.73M*2
EGFRCR SERPLBLD CKD-EPI 2021: 20 ML/MIN/1.73M*2
EOSINOPHIL # BLD MANUAL: 0 X10*3/UL (ref 0–0.7)
EOSINOPHIL NFR BLD MANUAL: 0 %
ERYTHROCYTE [DISTWIDTH] IN BLOOD BY AUTOMATED COUNT: 20.9 % (ref 11.5–14.5)
ERYTHROCYTE [DISTWIDTH] IN BLOOD BY AUTOMATED COUNT: 21 % (ref 11.5–14.5)
ERYTHROCYTE [DISTWIDTH] IN BLOOD BY AUTOMATED COUNT: 21.4 % (ref 11.5–14.5)
ERYTHROCYTE [SEDIMENTATION RATE] IN BLOOD BY WESTERGREN METHOD: 11 MM/H (ref 0–30)
GIANT PLATELETS BLD QL SMEAR: ABNORMAL
GLUCOSE BLD MANUAL STRIP-MCNC: 125 MG/DL (ref 74–99)
GLUCOSE BLD MANUAL STRIP-MCNC: 128 MG/DL (ref 74–99)
GLUCOSE BLD MANUAL STRIP-MCNC: 140 MG/DL (ref 74–99)
GLUCOSE BLD MANUAL STRIP-MCNC: 151 MG/DL (ref 74–99)
GLUCOSE BLD MANUAL STRIP-MCNC: 151 MG/DL (ref 74–99)
GLUCOSE SERPL-MCNC: 145 MG/DL (ref 74–99)
GLUCOSE SERPL-MCNC: 146 MG/DL (ref 74–99)
HCT VFR BLD AUTO: 22 % (ref 36–46)
HCT VFR BLD AUTO: 23.1 % (ref 36–46)
HCT VFR BLD AUTO: 24.1 % (ref 36–46)
HEMOCCULT SP1 STL QL: POSITIVE
HGB BLD-MCNC: 6.6 G/DL (ref 12–16)
HGB BLD-MCNC: 7.1 G/DL (ref 12–16)
HGB BLD-MCNC: 7.4 G/DL (ref 12–16)
IMM GRANULOCYTES # BLD AUTO: 0.23 X10*3/UL (ref 0–0.7)
IMM GRANULOCYTES NFR BLD AUTO: 7.1 % (ref 0–0.9)
INR PPP: 1.1 (ref 0.9–1.1)
LYMPHOCYTES # BLD MANUAL: 0.4 X10*3/UL (ref 1.2–4.8)
LYMPHOCYTES NFR BLD MANUAL: 12 %
MAGNESIUM SERPL-MCNC: 2.04 MG/DL (ref 1.6–2.4)
MCH RBC QN AUTO: 29.7 PG (ref 26–34)
MCH RBC QN AUTO: 29.8 PG (ref 26–34)
MCH RBC QN AUTO: 30.4 PG (ref 26–34)
MCHC RBC AUTO-ENTMCNC: 30 G/DL (ref 32–36)
MCHC RBC AUTO-ENTMCNC: 30.7 G/DL (ref 32–36)
MCHC RBC AUTO-ENTMCNC: 30.7 G/DL (ref 32–36)
MCV RBC AUTO: 101 FL (ref 80–100)
MCV RBC AUTO: 97 FL (ref 80–100)
MCV RBC AUTO: 97 FL (ref 80–100)
METAMYELOCYTES # BLD MANUAL: 0.03 X10*3/UL
METAMYELOCYTES NFR BLD MANUAL: 1 %
MONOCYTES # BLD MANUAL: 0.2 X10*3/UL (ref 0.1–1)
MONOCYTES NFR BLD MANUAL: 6 %
MYELOCYTES # BLD MANUAL: 0.03 X10*3/UL
MYELOCYTES NFR BLD MANUAL: 1 %
NEUTROPHILS # BLD MANUAL: 2.64 X10*3/UL (ref 1.2–7.7)
NEUTS BAND # BLD MANUAL: 0.33 X10*3/UL (ref 0–0.7)
NEUTS BAND NFR BLD MANUAL: 10 %
NEUTS SEG # BLD MANUAL: 2.31 X10*3/UL (ref 1.2–7)
NEUTS SEG NFR BLD MANUAL: 70 %
NOROVIRUS GI + GII RNA STL NAA+PROBE: NOT DETECTED
NRBC BLD-RTO: 3.4 /100 WBCS (ref 0–0)
NRBC BLD-RTO: 5.8 /100 WBCS (ref 0–0)
NRBC BLD-RTO: 6.9 /100 WBCS (ref 0–0)
PHOSPHATE SERPL-MCNC: 5 MG/DL (ref 2.5–4.9)
PHOSPHATE SERPL-MCNC: 5 MG/DL (ref 2.5–4.9)
PLATELET # BLD AUTO: 70 X10*3/UL (ref 150–450)
PLATELET # BLD AUTO: 76 X10*3/UL (ref 150–450)
PLATELET # BLD AUTO: 77 X10*3/UL (ref 150–450)
POLYCHROMASIA BLD QL SMEAR: ABNORMAL
POTASSIUM SERPL-SCNC: 4.2 MMOL/L (ref 3.5–5.3)
POTASSIUM SERPL-SCNC: 4.5 MMOL/L (ref 3.5–5.3)
POTASSIUM UR-SCNC: 73 MMOL/L
POTASSIUM/CREAT UR-RTO: 66 MMOL/G CREAT
PRODUCT BLOOD TYPE: 5100
PRODUCT CODE: NORMAL
PROT UR-ACNC: 91 MG/DL (ref 5–24)
PROT/CREAT UR: 0.83 MG/MG CREAT (ref 0–0.17)
PROTHROMBIN TIME: 12.5 SECONDS (ref 9.8–12.8)
RBC # BLD AUTO: 2.17 X10*6/UL (ref 4–5.2)
RBC # BLD AUTO: 2.39 X10*6/UL (ref 4–5.2)
RBC # BLD AUTO: 2.48 X10*6/UL (ref 4–5.2)
RBC MORPH BLD: ABNORMAL
RH FACTOR (ANTIGEN D): NORMAL
RH FACTOR (ANTIGEN D): NORMAL
RV RNA STL NAA+PROBE: NOT DETECTED
SALMONELLA DNA STL QL NAA+PROBE: NOT DETECTED
SHIGELLA DNA SPEC QL NAA+PROBE: NOT DETECTED
SODIUM SERPL-SCNC: 141 MMOL/L (ref 136–145)
SODIUM SERPL-SCNC: 141 MMOL/L (ref 136–145)
SODIUM UR-SCNC: 22 MMOL/L
SODIUM/CREAT UR-RTO: 20 MMOL/G CREAT
STAPHYLOCOCCUS SPEC CULT: NORMAL
TOTAL CELLS COUNTED BLD: 100
TOXIC GRANULES BLD QL SMEAR: PRESENT
UNIT ABO: NORMAL
UNIT NUMBER: NORMAL
UNIT RH: NORMAL
UNIT VOLUME: 400
V CHOLERAE DNA STL QL NAA+PROBE: NOT DETECTED
WBC # BLD AUTO: 2.8 X10*3/UL (ref 4.4–11.3)
WBC # BLD AUTO: 3.3 X10*3/UL (ref 4.4–11.3)
WBC # BLD AUTO: 3.3 X10*3/UL (ref 4.4–11.3)
XM INTEP: NORMAL
Y ENTEROCOL DNA STL QL NAA+PROBE: NOT DETECTED

## 2024-07-17 PROCEDURE — 82947 ASSAY GLUCOSE BLOOD QUANT: CPT

## 2024-07-17 PROCEDURE — 71045 X-RAY EXAM CHEST 1 VIEW: CPT | Performed by: RADIOLOGY

## 2024-07-17 PROCEDURE — C9113 INJ PANTOPRAZOLE SODIUM, VIA: HCPCS

## 2024-07-17 PROCEDURE — 85027 COMPLETE CBC AUTOMATED: CPT | Performed by: INTERNAL MEDICINE

## 2024-07-17 PROCEDURE — 84156 ASSAY OF PROTEIN URINE: CPT

## 2024-07-17 PROCEDURE — 85610 PROTHROMBIN TIME: CPT

## 2024-07-17 PROCEDURE — 2500000002 HC RX 250 W HCPCS SELF ADMINISTERED DRUGS (ALT 637 FOR MEDICARE OP, ALT 636 FOR OP/ED)

## 2024-07-17 PROCEDURE — 87328 CRYPTOSPORIDIUM AG IA: CPT

## 2024-07-17 PROCEDURE — 83735 ASSAY OF MAGNESIUM: CPT

## 2024-07-17 PROCEDURE — 87329 GIARDIA AG IA: CPT

## 2024-07-17 PROCEDURE — 94640 AIRWAY INHALATION TREATMENT: CPT

## 2024-07-17 PROCEDURE — 80069 RENAL FUNCTION PANEL: CPT

## 2024-07-17 PROCEDURE — 2500000004 HC RX 250 GENERAL PHARMACY W/ HCPCS (ALT 636 FOR OP/ED)

## 2024-07-17 PROCEDURE — 80069 RENAL FUNCTION PANEL: CPT | Performed by: INTERNAL MEDICINE

## 2024-07-17 PROCEDURE — 71045 X-RAY EXAM CHEST 1 VIEW: CPT

## 2024-07-17 PROCEDURE — 37799 UNLISTED PX VASCULAR SURGERY: CPT

## 2024-07-17 PROCEDURE — 2500000002 HC RX 250 W HCPCS SELF ADMINISTERED DRUGS (ALT 637 FOR MEDICARE OP, ALT 636 FOR OP/ED): Performed by: INTERNAL MEDICINE

## 2024-07-17 PROCEDURE — 85027 COMPLETE CBC AUTOMATED: CPT

## 2024-07-17 PROCEDURE — 85652 RBC SED RATE AUTOMATED: CPT

## 2024-07-17 PROCEDURE — 36430 TRANSFUSION BLD/BLD COMPNT: CPT

## 2024-07-17 PROCEDURE — 2500000001 HC RX 250 WO HCPCS SELF ADMINISTERED DRUGS (ALT 637 FOR MEDICARE OP)

## 2024-07-17 PROCEDURE — 99222 1ST HOSP IP/OBS MODERATE 55: CPT | Performed by: OTOLARYNGOLOGY

## 2024-07-17 PROCEDURE — 2020000001 HC ICU ROOM DAILY

## 2024-07-17 PROCEDURE — 87493 C DIFF AMPLIFIED PROBE: CPT | Mod: STJLAB

## 2024-07-17 PROCEDURE — P9016 RBC LEUKOCYTES REDUCED: HCPCS

## 2024-07-17 PROCEDURE — 82436 ASSAY OF URINE CHLORIDE: CPT

## 2024-07-17 PROCEDURE — 86901 BLOOD TYPING SEROLOGIC RH(D): CPT

## 2024-07-17 PROCEDURE — 99291 CRITICAL CARE FIRST HOUR: CPT

## 2024-07-17 PROCEDURE — 85007 BL SMEAR W/DIFF WBC COUNT: CPT | Performed by: INTERNAL MEDICINE

## 2024-07-17 PROCEDURE — 86920 COMPATIBILITY TEST SPIN: CPT

## 2024-07-17 PROCEDURE — 99232 SBSQ HOSP IP/OBS MODERATE 35: CPT | Performed by: STUDENT IN AN ORGANIZED HEALTH CARE EDUCATION/TRAINING PROGRAM

## 2024-07-17 PROCEDURE — 2500000004 HC RX 250 GENERAL PHARMACY W/ HCPCS (ALT 636 FOR OP/ED): Performed by: INTERNAL MEDICINE

## 2024-07-17 PROCEDURE — 82270 OCCULT BLOOD FECES: CPT

## 2024-07-17 PROCEDURE — 87506 IADNA-DNA/RNA PROBE TQ 6-11: CPT | Mod: STJLAB

## 2024-07-17 PROCEDURE — 86140 C-REACTIVE PROTEIN: CPT

## 2024-07-17 RX ORDER — OXYMETAZOLINE HCL 0.05 %
2 SPRAY, NON-AEROSOL (ML) NASAL EVERY 12 HOURS PRN
Status: DISCONTINUED | OUTPATIENT
Start: 2024-07-17 | End: 2024-07-19

## 2024-07-17 RX ORDER — PANTOPRAZOLE SODIUM 40 MG/10ML
40 INJECTION, POWDER, LYOPHILIZED, FOR SOLUTION INTRAVENOUS 2 TIMES DAILY
Status: DISCONTINUED | OUTPATIENT
Start: 2024-07-17 | End: 2024-07-19

## 2024-07-17 RX ORDER — HYDROMORPHONE HYDROCHLORIDE 0.2 MG/ML
0.2 INJECTION INTRAMUSCULAR; INTRAVENOUS; SUBCUTANEOUS ONCE
Status: COMPLETED | OUTPATIENT
Start: 2024-07-17 | End: 2024-07-17

## 2024-07-17 ASSESSMENT — COGNITIVE AND FUNCTIONAL STATUS - GENERAL
MOVING TO AND FROM BED TO CHAIR: TOTAL
EATING MEALS: TOTAL
DAILY ACTIVITIY SCORE: 6
DRESSING REGULAR LOWER BODY CLOTHING: TOTAL
CLIMB 3 TO 5 STEPS WITH RAILING: TOTAL
STANDING UP FROM CHAIR USING ARMS: TOTAL
HELP NEEDED FOR BATHING: TOTAL
WALKING IN HOSPITAL ROOM: TOTAL
DRESSING REGULAR LOWER BODY CLOTHING: TOTAL
TOILETING: TOTAL
CLIMB 3 TO 5 STEPS WITH RAILING: TOTAL
MOVING TO AND FROM BED TO CHAIR: TOTAL
EATING MEALS: TOTAL
TURNING FROM BACK TO SIDE WHILE IN FLAT BAD: TOTAL
DRESSING REGULAR UPPER BODY CLOTHING: TOTAL
WALKING IN HOSPITAL ROOM: TOTAL
MOVING FROM LYING ON BACK TO SITTING ON SIDE OF FLAT BED WITH BEDRAILS: TOTAL
STANDING UP FROM CHAIR USING ARMS: TOTAL
PERSONAL GROOMING: TOTAL
HELP NEEDED FOR BATHING: TOTAL
MOBILITY SCORE: 6
MOVING FROM LYING ON BACK TO SITTING ON SIDE OF FLAT BED WITH BEDRAILS: TOTAL
DRESSING REGULAR UPPER BODY CLOTHING: TOTAL
DAILY ACTIVITIY SCORE: 6
TOILETING: TOTAL
TURNING FROM BACK TO SIDE WHILE IN FLAT BAD: TOTAL
PERSONAL GROOMING: TOTAL
MOBILITY SCORE: 6

## 2024-07-17 ASSESSMENT — PAIN - FUNCTIONAL ASSESSMENT
PAIN_FUNCTIONAL_ASSESSMENT: CPOT (CRITICAL CARE PAIN OBSERVATION TOOL)

## 2024-07-17 NOTE — NURSING NOTE
0800: Patient had large bm of bright red blood and clots.   0900: Gi bedside  1200: Patient had second large bright red bm

## 2024-07-17 NOTE — HOSPITAL COURSE
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 from SNF due to AMS. In the ED, the patient was hypothermic at 32 C, hypotensive at 70/45 and hypoglycemic with glucose 49. CBC showed pancytopenia with WBC 2.4, hemoglobin 8.4, and platelets 68. CMP showed creatinine 2.41, as well as hypoproteinemia and hypoalbuminemia. Given the patients history of adrenal insufficiency, 100 mg IV Solu-Cortef was administered along with dextrose. Endocrinology was consulted who agreed with administration of Solu-Cortef. Unfortunately, blood pressure did not significantly improve with steroids or fluids and the patient required norepinephrine to maintain blood pressure before being admitted to the ICU.     Given the hypotension there was concern for septic shock, and the patient was started on Zosyn which was changed to Linezolid due to prior culture growing enterococcus faecium. Urine culture on this admission was negative and the patient was continued back on Zosyn. CT CAP was ordered which showed diverticulitis of the hepatic flexure, trace ascites, small right pleural effusion, and inflammatory nodules in the right middle and left lower lobe. GI was consulted due to diverticulitis who recommended continuing antibiotics with possible outpatient colonoscopy in 6 weeks before signing off. Endocrinology recommended continuing steroids which were weaned down to IV HC 25 mg q8h. Palliative was consulted due to the frequency and consistency of hospital readmissions, per palliative notes there is a plan to continue outpatient palliative follow up with [facility] but they are not quite ready for hospice,  and palliative signed off. While in the ICU, the patient had multiple dark and bright red BMs concerning for GI bleed and nasal bleeding that warranted removal of the placed NG tube. At that time hemoglobin was 6.6 and 1 unit pRBCs was required.   ENT was consulted due to epistaxis, who recommended Rhino Rocket's which were eventually removed. Further epistaxis was controlled with silver nitrate and as needed afrin with saline spray and Merocel sponge. Hemoglobin again decreased to 6.6 requiring another unit of blood and GI was contacted. GI recommended  possible CTA to evaluate for active bleed, however due to kidney function this was unable to be completed. The patient had been NPO due to diverticulitis and a Corpak was attempted, but was unable to be placed, unfortunately the patient is a poor PEG candidate and GI again signed off. Nephrology was consulted due to likely ischemic and sepsis related ATN on CKD3b and hypernatremia, D5W was administered as maintenance over 1 L, and there was no acute indication for RRT as the patient began to autodiurese. Hematology was consulted due to pancytopenia with plan for possible bone marrow biopsy in 4 weeks to assess for possible underlying etiology of pancytopenia. A repeat CXR was performed as the patient began requiring more oxygen, and this showed pulmonary edema with minimal small pleural effusions. The patient remained stable with Norepinephrine weaned off since 7/16, and after additional 1 unit platelet transfusion. After downgrade from the ICU patient developed A-fib with RVR unstable nature with blood pressure 70/30 was subsequently referred to the ICU.  She then converted to NSR and was downgraded from the ICU.  After downgrade she then reconverted to A-fib with RVR and has been vitally and hemodynamically stable being spot dose with metoprolol IV to control rates.  She was resumed on her home metoprolol 25 mg twice daily and has remained well rate controlled.   Patient is pending transfer downtown to [facility] for rheumatology evaluation due to her complex rheumatologic history and possible contribution to ongoing presentations.

## 2024-07-17 NOTE — PROGRESS NOTES
07/17/24 0804   Discharge Planning   Living Arrangements Alone   Support Systems Children   Assistance Needed yes   Type of Residence Nursing home/residential care   Home or Post Acute Services Post acute facilities (Rehab/SNF/etc)   Type of Post Acute Facility Services Long term care   Expected Discharge Disposition Other  (Return to [facility])   Does the patient need discharge transport arranged? Yes   RoundTrip coordination needed? Yes   Has discharge transport been arranged? No     Per palliative note, plan is to return to [facility] when medically ready. Return referral was sent yesterday per palliative, awaiting reply. Spoke to patient's son, López, on the phone to verify plan and introduce myself.     1300: [facility] can accept patient, but patient will need a new auth to return per facility.

## 2024-07-17 NOTE — CARE PLAN
The patient's goals for the shift include  Patient to remain safe and free from injury with pain and comfort managed and maintained throughout the shift.    The clinical goals for the shift include Patient to remain hemodynamically stable throughout the shift    Over the shift, the patient did not make progress toward the following goals. Barriers to progression include impaired skin integrity, confusion, pain, impaired mobility, and poor nutritional intake. Recommendations to address these barriers include   Problem: Skin  Goal: Decreased wound size/increased tissue granulation at next dressing change  Flowsheets (Taken 7/17/2024 0047)  Decreased wound size/increased tissue granulation at next dressing change:   Promote sleep for wound healing   Protective dressings over bony prominences  Goal: Participates in plan/prevention/treatment measures  Flowsheets (Taken 7/17/2024 0047)  Participates in plan/prevention/treatment measures:   Discuss with provider PT/OT consult   Elevate heels  Goal: Prevent/manage excess moisture  Flowsheets (Taken 7/17/2024 0047)  Prevent/manage excess moisture:   Cleanse incontinence/protect with barrier cream   Monitor for/manage infection if present   Use wicking fabric (obtain order)   Follow provider orders for dressing changes  Goal: Prevent/minimize sheer/friction injuries  Flowsheets (Taken 7/17/2024 0047)  Prevent/minimize sheer/friction injuries:   Increase activity/out of bed for meals   Complete micro-shifts as needed if patient unable. Adjust patient position to relieve pressure points, not a full turn   Use pull sheet   HOB 30 degrees or less   Turn/reposition every 2 hours/use positioning/transfer devices  Goal: Promote/optimize nutrition  Flowsheets (Taken 7/17/2024 0047)  Promote/optimize nutrition:   Offer water/supplements/favorite foods   Consume > 50% meals/supplements   Reassess MST if dietician not consulted   Assist with feeding  Goal: Promote skin  healing  Flowsheets (Taken 7/17/2024 0047)  Promote skin healing:   Assess skin/pad under line(s)/device(s)   Protective dressings over bony prominences   Turn/reposition every 2 hours/use positioning/transfer devices   .

## 2024-07-17 NOTE — CONSULTS
Please see original GI consult note from 7/15.     Plan has been discussed with Dr. Shi . GI will continue to follow.    JIM Ding/CNP

## 2024-07-17 NOTE — CONSULTS
+Reason For Consult  Epistaxis after NG tube placement    History Of Present Illness  Bing Holliday is a 63 y.o. female who is currently in the ICU apparently had an NG tube in her right nostril and this was somewhat difficult to pass.  Subsequently she developed bleeding from her right nostril and this was coming around the back and out of her left nostril.  She had not been on any blood thinners.  Currently she is on subcu heparin.  No recent trauma to the nose and no evidence of any infectious drainage from her sinuses.  She has a Rhino Rocket in each nostril and bleeding is controlled.  ENT is consulted for management..     Past Medical History  She has a past medical history of Abnormal kidney function (04/04/2023), Acute headache (03/10/2024), Acute iritis of right eye (07/24/2015), Acute low back pain (10/30/2023), Acute upper respiratory infection, unspecified (03/04/2020), Acute upper respiratory infection, unspecified (09/30/2015), Arthralgia of right knee (02/14/2024), Arthritis, Atypical facial pain (03/10/2024), Body mass index (BMI) 23.0-23.9, adult (10/15/2021), Body mass index (BMI) 33.0-33.9, adult (03/04/2020), Cardiomegaly (08/27/2013), Chest pain (06/10/2022), Chronic kidney disease, stage 3 unspecified (Multi) (07/02/2013), Confusional state (03/10/2024), Contact with and (suspected) exposure to covid-19 (04/04/2023), Delirium (03/10/2024), Disease of pericardium, unspecified ([facility]) (07/02/2013), Encounter for follow-up examination after completed treatment for conditions other than malignant neoplasm (10/06/2022), Generalized contraction of visual field, right eye (01/29/2015), Hearing loss (03/10/2024), History of cataract (03/10/2024), History of thrombocytopenia (03/10/2024), Homonymous bilateral field defects, right side (04/29/2016), Hypertensive chronic kidney disease with stage 1 through stage 4 chronic kidney disease, or unspecified chronic kidney disease (07/02/2013), Laceration  without foreign body, left foot, initial encounter (07/03/2018), Localized edema (03/10/2024), Localized, primary osteoarthritis (02/14/2024), Mass of skin (03/10/2024), Migraine with aura, not intractable, without status migrainosus (10/24/2022), Open wound (03/10/2024), Open wound of left foot (03/10/2024), Other conditions influencing health status (07/02/2013), Other conditions influencing health status (07/02/2013), Other conditions influencing health status (07/02/2013), Other conditions influencing health status (05/22/2015), Other conditions influencing health status (10/24/2022), Other conditions influencing health status (03/14/2022), Other long term (current) drug therapy (10/24/2022), Overweight with body mass index (BMI) 25.0-29.9 (03/10/2024), Pain of toe (03/10/2024), Personal history of COVID-19 (04/04/2023), Personal history of diseases of the blood and blood-forming organs and certain disorders involving the immune mechanism (07/02/2013), Personal history of diseases of the skin and subcutaneous tissue (08/11/2015), Personal history of nephrotic syndrome (07/02/2013), Personal history of other diseases of the circulatory system (08/27/2013), Personal history of other diseases of the nervous system and sense organs, Personal history of other diseases of the respiratory system, Personal history of other infectious and parasitic diseases (07/02/2013), Personal history of other specified conditions, Personal history of other specified conditions (08/27/2013), Posterior epistaxis (03/10/2024), Puckering of macula, right eye (10/24/2022), Raynaud's syndrome without gangrene (07/02/2013), Sinusitis (03/10/2024), Systemic lupus erythematosus, unspecified (Multi) (07/24/2015), Systemic lupus erythematosus, unspecified (Multi) (07/24/2015), Systemic lupus erythematosus, unspecified (Multi) (07/24/2015), 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) (07/02/2013), Type 2 diabetes mellitus with  mild nonproliferative diabetic retinopathy without macular edema, left eye (Multi) (07/27/2015), Type 2 diabetes mellitus with mild nonproliferative diabetic retinopathy without macular edema, unspecified eye (Multi) (07/24/2015), Type 2 diabetes mellitus with proliferative diabetic retinopathy without macular edema, right eye (Multi) (07/27/2015), Type 2 diabetes mellitus with proliferative diabetic retinopathy without macular edema, unspecified eye (Multi) (07/24/2015), Unspecified acute and subacute iridocyclitis (07/24/2015), and Unspecified open wound, left foot, sequela (07/03/2018).    Surgical History  She has a past surgical history that includes Eye surgery (03/06/2015); Total hip arthroplasty (01/29/2015); Cholecystectomy (01/29/2015); Other surgical history (01/29/2015); Other surgical history (01/29/2015); Ankle surgery (01/29/2015); Foot surgery (01/29/2015); MR angio head wo IV contrast (7/26/2013); MR angio neck wo IV contrast (7/26/2013); CT guided percutaneous biopsy bone deep (5/4/2021); MR angio head wo IV contrast (9/17/2021); MR angio neck wo IV contrast (9/17/2021); MR angio neck wo IV contrast (3/25/2023); MR angio head wo IV contrast (3/25/2023); and Cardiac electrophysiology procedure (Left, 5/17/2024).     Social History  She reports that she has never smoked. She has never been exposed to tobacco smoke. She has never used smokeless tobacco. She reports that she does not currently use alcohol. She reports that she does not use drugs.    Family History  Family History   Problem Relation Name Age of Onset    Other (RENAL DISEASE) Mother          END STAGE    Other (CARDIAC DISORDER) Mother      Cataracts Mother      Stroke Mother      Diabetes Mother      Kidney disease Mother      Lupus Mother      Other (CARDIAC DISORDER) Father      COPD Father      Glaucoma Father      Hypertension Father      Sleep apnea Father      Other (CARDIAC DISORDER) Sister      Depression Sister      Kidney  "disease Sister      Sickle cell trait Sister      Sleep apnea Daughter          Allergies  Ace inhibitors, Hydroxychloroquine, Lisinopril, and Sulfa (sulfonamide antibiotics)    Review of Systems  A detailed 12 system review of systems is noted on the intake form has been reviewed with the patient with details noted in the HPI and chart.  Patient is currently noncommunicative     Physical Exam  General: Patient is alert, oriented, cooperative in no apparent distress.  Patient is currently nonverbal  Head: Normocephalic, atraumatic.  Eyes: PERRL, EOMI, Conjunctiva is clear. No nystagmus.  Ears: Right Ear-- Pinna is normal.  External auditory canal is patent, no lesions. Tympanic membrane is [intact, translucent and has good mobility with my pneumatic otoscope. No effusion].  Mastoid is nontender.            Left ear-- Pinna is normal.  External auditory canal is patent, no lesions.  Tympanic membrane is [intact, translucent and has good mobility with my pneumatic otoscope.  No effusion].  Mastoid is nontender.  Nose: She has 1 Rhino Rocket in each nostril and there is no evidence of any active bleeding.  Throat:  Floor of mouth is clear, no masses.  Tongue appears normal, no lesions or masses. Gums, gingiva, buccal mucosa appear pink and moist, no lesions. Teeth are in good repair.  No obvious dental infections.  Peritonsillar regions appear symmetric without swelling.  Hard and soft palate appear normal, no obvious cleft. Uvula is midline.  Oropharynx: No lesions. Retropharyngeal wall is flat.  No active postnasal drip.  No evidence of any posterior clots or active posterior bleeding  Neck: Supple,  no lymphadenopathy.  No masses.  Salivary Glands: Symmetric bilaterally.  No palpable masses.  No evidence of acute infection or salivary stones       Last Recorded Vitals  Blood pressure 123/74, pulse 72, temperature 36 °C (96.8 °F), temperature source Temporal, resp. rate 12, height 1.651 m (5' 5\"), weight 97.1 kg (214 " lb), SpO2 96%.    Relevant Results           Assessment/Plan   Bilateral epistaxis-currently stable with a Rhino Rocket in each nostril.  Multiple medical comorbidities    Recommendation:  Will leave Rhino Rocket's in each nostril for an additional 24 to 48 hours and then I will remove the left one and make sure there is no bleeding around the back and into the left nostril.  We may leave the right when in place for 4 to 5 days.  Okay to continue subcu heparin.  Continue to monitor blood pressure closely.  Will follow along with you.    I spent 60 minutes in the professional and overall care of this patient.    Thank you for allowing me to assist in the care of your patient,  Evaristo Rolle, DO

## 2024-07-17 NOTE — PROGRESS NOTES
Critical Care Daily Progress        Subjective   Patient is a 63 y.o. female admitted on 7/15/2024 10:13 AM to the ICU for shock secondary to adrenal sufficiency and/or sepsis.    Overnight Events:   Corpak attempted to be placed overnight to give home oral medications as well as to potentially initiate tube feeds.  Patient's son was agreeable with this.  Unsuccessful placement of the Corpak and patient began to have epistaxis bilaterally after the attempts.  Unable to control the bleeding with nasal Afrin.  Patient's nose packed bilaterally.  She also began having maroon-colored bowel movements.  Her hemoglobin dropped from 8.1 to 6.6 and she received 1 unit of PRBC. Repeat H/H pending.    Patient seen and examined at bedside this morning.  She has some mild improvement in her mentation, able to follow simple commands weakly, nod/shake to answer questions.  Abdomen still soft.  Had another maroon-colored bowel movement.    Scheduled Medications:   [Held by provider] atorvastatin, 40 mg, oral, Nightly  budesonide, 0.5 mg, nebulization, BID  [Held by provider] docusate sodium, 100 mg, oral, BID  folic acid, 1 mg, nasogastric tube, Daily  formoterol, 20 mcg, nebulization, BID  heparin (porcine), 5,000 Units, subcutaneous, q8h  hydrocortisone sodium succinate, 50 mg, intravenous, q8h  insulin lispro, 0-5 Units, subcutaneous, q4h  ipratropium-albuteroL, 3 mL, nebulization, TID  levETIRAcetam, 500 mg, intravenous, q12h  [Held by provider] melatonin, 5 mg, oral, Nightly  [Held by provider] mycophenolate, 1,000 mg, oral, BID  pantoprazole, 40 mg, intravenous, Daily  piperacillin-tazobactam, 4.5 g, intravenous, q8h  [Held by provider] polyethylene glycol, 17 g, oral, Daily  risperiDONE, 3 mg, nasogastric tube, BID  [Held by provider] sertraline, 25 mg, nasogastric tube, Nightly  thiamine, 100 mg, nasogastric tube, Daily         Continuous Medications:   norepinephrine, 0-0.2 mcg/kg/min, Last Rate: Stopped (07/16/24  1515)         PRN Medications:   PRN medications: acetaminophen, alteplase, dextrose, dextrose, glucagon, glucagon, ipratropium-albuteroL, magnesium sulfate, oxymetazoline, potassium chloride    Objective   Vitals:  Temp  Min: 35.9 °C (96.6 °F)  Max: 36.2 °C (97.2 °F)  Pulse  Min: 60  Max: 100  BP  Min: 91/58  Max: 123/74  Resp  Min: 9  Max: 57  SpO2  Min: 91 %  Max: 100 %    Physical Exam:   Physical Exam   Physical Exam  Vitals and nursing note reviewed.   Constitutional:       Appearance: She is well-developed. She is ill-appearing.   HENT:      Head: Normocephalic and atraumatic.      Right Ear: External ear normal.      Left Ear: External ear normal.      Nose:      Comments: Bilateral nasal packing, small amount of old blood around the nostrils.     Mouth/Throat:      Mouth: Mucous membranes are dry.   Eyes:      General: No scleral icterus.     Extraocular Movements: Extraocular movements intact.      Conjunctiva/sclera: Conjunctivae normal.      Pupils: Pupils are equal, round, and reactive to light.   Cardiovascular:      Rate and Rhythm: Regular rhythm. Tachycardia present.      Heart sounds: No murmur heard.  Pulmonary:      Effort: Pulmonary effort is normal. No respiratory distress.      Breath sounds: Normal breath sounds.   Abdominal:      Palpations: Abdomen is soft.      Tenderness: There is no abdominal tenderness. There is no guarding or rebound.   Musculoskeletal:         General: No swelling.      Cervical back: Neck supple.   Skin:     General: Skin is warm and dry.      Comments: Old, closed skin wounds on lower legs bilaterally. Do not appear infected.   Neurological:      Mental Status: She is alert. She is disoriented.      Comments: Follows simple commands, nods and shakes head to answer simple questions.  Still encephalopathic and not speaking.               Assessment/Plan   Overall assessment:  Patient is a 63-year-old female admitted to the ICU for shock, suspect secondary to adrenal  insufficiency but sepsis from diverticulitis also within the differential     Neuro: Encephalopathy secondary to hypoperfusion, sepsis  - History: Lupus cerebritis  - Home meds: Continue home Keppra.  Holding home sertraline, risperidone until mentation improves  - [facility] ICU    Cardiovascular: Shock secondary to adrenal insufficiency and/or sepsis  - History: Atrial fibrillation no longer on anticoagulation, pacemaker, hypertension, hyperlipidemia  - Goals: MAP> 65  - Home meds: Resume home atorvastatin  - Last echo: LVEF 40 to 45% (5/2024)  -Norepinephrine drip weaned off yesterday evening    Pulmonary:       - History: COPD  - Home meds: Continue DuoNeb and Pulmicort as needed for wheezing  Continuous pulse oximetry   O2 PRN to maintain SpO2 > 94%, wean as tolerated  - Imaging: CT chest shows scattered inflammatory pulmonary nodules.  No consolidation.    Gastrointestinal: Diverticulitis at the hepatic flexure   Results from last 7 days   Lab Units 07/16/24  0252 07/15/24  1114   INR   --  1.0   ALK PHOS U/L 177* 134   AST U/L 20 34   ALT U/L 25 27   LIPASE U/L  --  21       - Diet: NPO for now given diverticulitis, Corpak could not be placed, patient too encephalopathic to tolerate PO.  - GI on consult for diverticulitis. Continue supportive care. Outpatient colonoscopy in approximately six weeks if appropriate for goals of care.  - GI reconsulted for the GI bleed, drop in hemoglobin  -Patient is on consented to Corpak placement, but unable to place.  Will need to discuss goals of care with the son, as well as with GI for PEG tube placement if desired.  - Prophylaxis: Continue home Protonix, but increased to 40mg IV BID due to GI bleed  - Bowel regimen: None  - Last BM: Last BM Date: 07/17/24    Renal:  Acute kidney injury on chronic kidney disease, suspect secondary to initial hypoperfusion/hypovolemia  Results from last 7 days   Lab Units 07/17/24  0250 07/16/24  0252 07/15/24  1301 07/15/24  1114   SODIUM mmol/L  141 142  --  144   POTASSIUM mmol/L 4.5 4.5  --  4.4   MAGNESIUM mg/dL 2.04 1.87 1.56* 1.70   PHOSPHORUS mg/dL 5.0* 4.3  --   --    BUN mg/dL 31* 26*  --  28*   CREATININE mg/dL 2.58* 2.32*  --  2.41*       Net IO Since Admission: 3,433.96 mL [07/17/24 0835]  - Daily CMP,Mg,Phos  - History: CKD, baseline creatinine 1.4  - [place] with strict I/O   -Clinically appears hypovolemic, hydrate as necessary  - Electrolytes: Replete per protocol, goal K>4 Phos >3 Mg >2    Endocrine: Adrenal insufficiency  Results from last 7 days   Lab Units 07/17/24  0740 07/17/24  0419 07/17/24  0250 07/16/24  2346 07/16/24  1945 07/16/24  1605 07/16/24  1142 07/16/24  0337 07/16/24  0252 07/15/24  1134 07/15/24  1114   POCT GLUCOSE mg/dL 151* 125*  --  164* 139* 121* 119*   < >  --    < >  --    GLUCOSE mg/dL  --   --  145*  --   --   --   --   --  129*  --  49*   TSH mIU/L  --   --   --   --   --   --   --   --   --   --  4.67*    < > = values in this interval not displayed.      -History: Adrenocortical insufficiency, diabetes  -Home meds: Holding Florinef and hydrocortisone, administering Solu-Cortef  -Endocrinology on consult, Solucortef 50mg q8h  -sliding scale: Insulin lispro  -BG < 180 goal    Rheumatology:  -History: Rheumatoid arthritis  -Holding mycophenolate due to risk of bone marrow suppression    ENT:  -Bilateral epistaxis and nasal packing after Corpak attempt  -ENT on consult. Will leave nasal packing in more now, possible removal tomorrow or day after.  -Patient already on Zosyn    Hematology:  Results from last 7 days   Lab Units 07/17/24  0250 07/16/24  0252 07/15/24  1114   HEMOGLOBIN g/dL 6.6* 8.1* 8.4*   HEMATOCRIT % 22.0* 27.4* 28.1*   PLATELETS AUTO x10*3/uL 76* 84* 68*     - Daily CBC  -History: Chronic thrombocytopenia  - DVT Prophylaxis: Holding due to epistaxis and GI bleed    Infectious Disease: Diverticulitis  Results from last 7 days   Lab Units 07/17/24  0250 07/16/24  0252 07/15/24  1114   WBC AUTO x10*3/uL  2.8* 2.8* 2.4*     Temp (24hrs), Av °C (96.8 °F), Min:35.9 °C (96.6 °F), Max:36.2 °C (97.2 °F)     -Cultures: Blood and urine pending  -Hx E. Faecium UTI, urine culture negative on this admission  -Abx: Zosyn for diverticulitis.    Musculoskeletal:   - PT to see   - OT to see       LDA:  CVC 07/15/24 Triple lumen Left Internal jugular (Active)   Placement Date/Time: 07/15/24 1500   Hand Hygiene Performed Prior to CVC Insertion: Yes  Site Prep: Chlorhexidine   Site Prep Agent has Completely Dried Before Insertion: Yes  All 5 Sterile Barriers Used (Gloves, Gown, Cap, Mask, Large Sterile Drape):...   Number of days: 0       Arterial Line 07/15/24 Right Radial (Active)   Placement Date/Time: 07/15/24 (c) 2224   Size: 20 G  Orientation: Right  Location: Radial  Site Prep: Chlorhexidine   Local Anesthetic: Injectable  Insertion attempts: 1  Securement Method: Transparent dressing;Taped   Number of days: 0       Urethral Catheter (Active)   Placement Date/Time: 07/15/24 1300     Number of days: 0         CODE STATUS: DNR and No Intubation    Disposition: Patient to remain in the ICU, vasopressors discontinued yesterday evening.    RESTRAINTS: type: None    ABCDEF Checklist  Analgesia: Spontaneous awakening trial to be pursued if clinically appropriate. RASS goal reviewed  Breathing: Spontaneous breathing trial to be pursued if clinically appropriate. Mechanical power of assisted ventilation reviewed  Choice of analgesia/sedation: Analgesic and sedative agents adjusted per clinical context.   Delirium assessed by CAM, will avoid exacerbating factors  Early mobility and exercise: Physical and occupational therapy engaged  Family: Plan of care, overall trajectory of patient shared with family. Questions elicited and answered as appropriate.     Due to the high probability of life threatening clinical decompensation, the patient required critical care time evaluating and managing this patient.  Critical care time included  obtaining a history, examining the patient, ordering and reviewing studies, discussing, developing, and implementing a management plan, evaluating the patient's response to treatment, and discussion with other care team providers. I saw and evaluated the patient myself.  Critical care time was performed exclusive of billable procedures.      Case discussed with attending , Dr. Fausto Tyler DO

## 2024-07-17 NOTE — SIGNIFICANT EVENT
Overnight, patient had 2 bowel movement of dark stool, raising concern for GI bleed.  Guaiac test was ordered.  Patient also had an episode of nasal bleeding, warranting removal of her NG tube, and nasal packing with prerenal after treating with Afrin.  Stat CBC was ordered, which showed hemoglobin of 6.6.  Patient was transfused a unit of blood products.  Subsequently patient received 0.2 mg of Dilaudid for pain control.

## 2024-07-17 NOTE — PROGRESS NOTES
Bing Holliday is a 63 y.o. female on day 2 of admission presenting with Hypothermia, initial encounter.    Subjective     GI was re-consulted for BRBPR, acute anemia, and consideration for PEG tube placement.    Overnight 7/16, reportedly had 5 bowel movements, 2 of which were bright red in color with associated clots with 'bubbles' and mucous. No tarry, foul-smelling stool noted. Nurse at bedside with picture.  Patient has a history of C diff, PCR + and - toxin A/B 1/17/24. Fluid collection container at bedside with bright red blood with clot formation and mucoid contents that was collected per rectum. Additionally overnight, was agitated, was moving her Corpak around aggressively, and subsequently suffered epistaxis requiring Afrin and Rhino Rocket.  Corpak was removed.  Overnight, subcu heparin was held given acute bleed. Stat CBC demonstrated hemoglobin of 6.6, for which she was transfused 1 unit PRBC.  Repeat CBC s/p transfusion 7.4.    She remains on Zosyn for treatment of septic shock with known diverticulitis.    CT C/A/P without contrast: Small right effusion, small ascites, diverticulitis adjacent to hepatic flexure without defined abscess.  Diverticulosis of the sigmoid without sigmoidal diverticulitis.  Colonoscopy 2018: 3 polyps (largest 15 mm), diverticulosis, hemorrhoids    This morning, discussion had with bedside nurse and ICU resident, Dr. Tyler, regarding indication for PEG tube placement. Endocrinology note with suspicion for poor medication compliance at [facility]. Endo reviewed MAR at [facility], and all appropriate medications are listed. Per discussion with Endo and staff at [facility], F staff states patient is pocketing pills and not taking them.  Unclear if there is secondary gain for patient. Nonetheless, recurrent and frequent readmissions of unclear etiology, though medication compliance high on list.    Per recent SLP notes 7/30/2024, it was recommended that her oral intak being with just puréed solids  donuts the next (level 4) with thin liquids and crushed medications in the puréed carrier.    Per palliative care discussion 7/16/2024 with son (POA), son is not ready to entertain the idea of hospice at this time, though was agreeable to Corpak placement for enteral feedings. Per son, he is unsure what is causing her to have difficulty swallowing, as he had never been told that his mother was having these issues.    On chart review from last hospitalization, internal medicine note from 7/8/2024 demonstrating her diet was regular (reportedly ate all of her breakfast) and she was tolerating p.o. medications.    Patient seen and evaluated this morning.  Overall a poor informant. ANO x 0.  Shakes her head no when asked if she is at the hospital.  She is nonverbal with me this morning.  She shakes her head yes and no to questioning.  She follows basic commands.  ROS difficult to obtain given mental status.    Objective     Physical Exam  VITALS: I have reviewed the vital signs.   GENERAL: Well developed adult in no acute distress.  Resting comfortably in bed.  On Venturi mask  NEURO: ANO x 0 this morning.  Awakens to name.  Poor tracking with eyes.  Follows basic commands in the upper and lower extremity. Moves all extremities. Face is symmetric and expressive. No tremor.  EYES: PERRL. No scleral icterus or conjunctival injection. No discharge.   HENT: Normocephalic, atraumatic. Hearing is grossly intact. Nares with Rhino Rocket in place.  Mucous membranes dry  NECK: No JVD. Patient moves neck without restriction.   CARDIO: Rhythm regular. Normal rate. No murmur, rub, or gallop. Pulses equal bilaterally in the upper and lower extremity. No lower extremity edema.   PULM: Lungs clear to auscultation in all fields. No wheezes, rales, or rhonchi. No conversational dyspnea. No splinting, stridor, or accessory muscle use.    GI: Abdomen is soft and non-distended. No tenderness to palpation. No guarding or rigidity.  "Normoactive bowel sounds.   EXTREMITIES: Symmetric muscle bulk. No joint swelling. No clubbing, cyanosis, or deformity. Muscle strength 5/5 in b/l upper and lower extremities  SKIN: Healing both skin wounds on lower extremities bilaterally without serosanguineous drainage or purulence  PSYCH: Disoriented     last Recorded Vitals  Blood pressure 123/74, pulse 92, temperature 36 °C (96.8 °F), temperature source Temporal, resp. rate 24, height 1.651 m (5' 5\"), weight 97.1 kg (214 lb), SpO2 96%.  Intake/Output last 3 Shifts:  I/O last 3 completed shifts:  In: 3488.1 (35.9 mL/kg) [I.V.:1236 (12.7 mL/kg); Blood:402.1; NG/GT:150; IV Piggyback:1700]  Out: 740 (7.6 mL/kg) [Urine:740 (0.2 mL/kg/hr)]  Weight: 97.1 kg     Relevant Results  Scheduled medications  [Held by provider] atorvastatin, 40 mg, oral, Nightly  budesonide, 0.5 mg, nebulization, BID  [Held by provider] docusate sodium, 100 mg, oral, BID  [Held by provider] folic acid, 1 mg, nasogastric tube, Daily  formoterol, 20 mcg, nebulization, BID  [Held by provider] heparin (porcine), 5,000 Units, subcutaneous, q8h  hydrocortisone sodium succinate, 50 mg, intravenous, q8h  insulin lispro, 0-5 Units, subcutaneous, q4h  ipratropium-albuteroL, 3 mL, nebulization, TID  lactated Ringer's, 500 mL, intravenous, Once  levETIRAcetam, 500 mg, intravenous, q12h  [Held by provider] melatonin, 5 mg, oral, Nightly  [Held by provider] mycophenolate, 1,000 mg, oral, BID  pantoprazole, 40 mg, intravenous, BID  piperacillin-tazobactam, 4.5 g, intravenous, q8h  [Held by provider] polyethylene glycol, 17 g, oral, Daily  [Held by provider] risperiDONE, 3 mg, nasogastric tube, BID  [Held by provider] sertraline, 25 mg, nasogastric tube, Nightly  [Held by provider] thiamine, 100 mg, nasogastric tube, Daily      Continuous medications  norepinephrine, 0-0.2 mcg/kg/min, Last Rate: Stopped (07/16/24 0255)      PRN medications  PRN medications: [Held by provider] acetaminophen, alteplase, " dextrose, dextrose, glucagon, glucagon, ipratropium-albuteroL, magnesium sulfate, oxymetazoline, potassium chloride  Results from last 7 days   Lab Units 07/17/24  0904 07/17/24  0250 07/16/24  0252   WBC AUTO x10*3/uL 3.3* 2.8* 2.8*   RBC AUTO x10*6/uL 2.48* 2.17* 2.73*   HEMOGLOBIN g/dL 7.4* 6.6* 8.1*     Results from last 7 days   Lab Units 07/17/24  0250 07/16/24  0252 07/15/24  1301 07/15/24  1114   SODIUM mmol/L 141 142  --  144   POTASSIUM mmol/L 4.5 4.5  --  4.4   CHLORIDE mmol/L 111* 112*  --  113*   CO2 mmol/L 21 20*  --  22   BUN mg/dL 31* 26*  --  28*   CREATININE mg/dL 2.58* 2.32*  --  2.41*   CALCIUM mg/dL 7.5* 7.9*  --  8.0*   PHOSPHORUS mg/dL 5.0* 4.3  --   --    MAGNESIUM mg/dL 2.04 1.87 1.56* 1.70   BILIRUBIN TOTAL mg/dL  --  0.3  --  0.3   ALT U/L  --  25  --  27   AST U/L  --  20  --  34       XR abdomen 1 view    Result Date: 7/16/2024  Interpreted By:  Katrina Brice, STUDY: XR ABDOMEN 1 VIEW;  7/16/2024 10:00 pm   INDICATION: Signs/Symptoms:ng placement.   COMPARISON: CT 07/15/2024   ACCESSION NUMBER(S): [id number]   ORDERING CLINICIAN: JAMAL LEBRON   FINDINGS: NG tube noted with tip overlying the gastric antrum region. Nonobstructive bowel gas pattern. Limited evaluation of pneumoperitoneum on supine imaging, however no gross evidence of free air is noted.   Visualized lungs are clear.   Osseous structures demonstrate no acute bony changes.   Severe left hip joint degenerative changes with joint space loss. Severe degenerative changes of the lower lumbar spine.       1. Nonobstructive bowel gas pattern 2. NG tube tip overlying the gastric antrum region.   MACRO: None   Signed by: Katrina Brice 7/16/2024 10:11 PM Dictation workstation:   [id number]    ECG 12 lead    Result Date: 7/16/2024  Atrial fibrillation ST & T wave abnormality, consider inferior ischemia or digitalis effect ST & T wave abnormality, consider anterolateral ischemia or digitalis effect Abnormal ECG When compared  with ECG of 15-JUL-2024 11:12, (unconfirmed) Electronic demand pacing is no longer Present ST no longer elevated in Lateral leads Inverted T waves have replaced nonspecific T wave abnormality in Lateral leads QT has shortened    ECG 12 lead    Result Date: 7/16/2024  Demand pacemaker, interpretation is based on intrinsic rhythm Atrial fibrillation with rapid ventricular response with premature ventricular or aberrantly conducted complexes ST & T wave abnormality, consider inferolateral ischemia or digitalis effect Abnormal ECG When compared with ECG of 15-JUL-2024 12:16, (unconfirmed) Electronic demand pacing is now Present ST now depressed in Lateral leads T wave inversion more evident in Inferior leads T wave inversion less evident in Anterior leads T wave inversion more evident in Lateral leads    XR chest 1 view    Result Date: 7/15/2024  Interpreted By:  Mitesh Khan, STUDY: XR CHEST 1 VIEW;  7/15/2024 5:51 pm   INDICATION: Signs/Symptoms:line placement.   COMPARISON: 07/15/2024   ACCESSION NUMBER(S): [id number]   ORDERING CLINICIAN: TEETEE MOONEY   FINDINGS: Left-sided pacemaker in place.     CARDIOMEDIASTINAL SILHOUETTE: There is enlarged of the cardiac silhouette with vascular congestion. No donn edema seen.   LUNGS: There is no consolidation. There is no effusion.   ABDOMEN: No remarkable upper abdominal findings.   BONES: No acute osseous changes.       1.  Enlarged cardiac silhouette with pulmonary vascular congestion.       MACRO: None   Signed by: Mitesh Khan 7/15/2024 5:55 PM Dictation workstation:   [id number]    CT chest abdomen pelvis wo IV contrast    Addendum Date: 7/15/2024    NOTIFICATION:  The positive results of the study were discussed with, and acknowledged by AMINAH Hill, by telephone on 7/15/2024 at 1612 hours. Signed by Cheko Wise MD    Result Date: 7/15/2024  STUDY: CT Chest, Abdomen, and Pelvis without IV Contrast; 7/15/2024, 13:30 INDICATION: Sepsis, hypotension,  hypoglycemia, hypothermia. COMPARISON: CT chest abd 6/28/2024, 6/9/2024, 3/20/2024, 1/14/2024. ACCESSION NUMBER(S): [id number] ORDERING CLINICIAN: ANTONIA ANGLIN TECHNIQUE: CT of the chest, abdomen, and pelvis was performed.  Contiguous axial images were obtained at 3 mm slice thickness through the chest, abdomen, and pelvis.  Coronal and sagittal reconstructions at 3 mm slice thickness were performed.  No intravenous contrast was administered.  FINDINGS: Please note that the evaluation of vessels, lymph nodes and organs is limited without intravenous contrast. CHEST: MEDIASTINUM: Heart is enlarged.  Pacemaker is in place on the left..  Extensive coronary artery calcifications are identified.  LUNGS/PLEURA: There is a new small right pleural effusion..  The airways are patent. There is a faint 9 mm nodule in the right middle lobe, not present on the most recent study.  The nodules that were noted at the right base on the prior study have nearly completely resolved.  There is a 7 mm nodule at the left lung base which was not seen on the prior study. LYMPH NODES: Thoracic lymph nodes are not enlarged. ABDOMEN:  LIVER: No hepatomegaly.  Smooth surface contour.  Normal attenuation.  There is a small amount of ascites around the edge of the liver, not seen on the prior study.  BILE DUCTS: No intrahepatic or extrahepatic biliary ductal dilatation.  GALLBLADDER: The gallbladder is absent. STOMACH: No abnormalities identified.  PANCREAS: No masses or ductal dilatation.  SPLEEN: No splenomegaly or focal splenic lesion.  ADRENAL GLANDS: No thickening or nodules.  KIDNEYS AND URETERS: Kidneys are normal in size and location.  There is a punctate nonobstructing calculus in the right kidney as seen previously.  PELVIS:  BLADDER: Not well evaluated due to artifact from hip prosthesis.  There is a Montano catheter in place.  REPRODUCTIVE ORGANS: Not seen  BOWEL: There is an area of acute inflammation adjacent to the hepatic flexure  of the colon close to several diverticula.  No defined abscess is seen.  This was not present on the prior study.  No appendiceal enlargement or inflammation is seen.  There is diverticulosis of the sigmoid without inflammation.  VESSELS: No abnormalities identified.  Abdominal aorta is normal in caliber.  PERITONEUM/RETROPERITONEUM/LYMPH NODES: There is small to moderate fluid in the cul-de-sac.  No pneumoperitoneum. No lymphadenopathy.  ABDOMINAL WALL: No abnormalities identified. SOFT TISSUES: There is mild edema in the subcutaneous tissues in the lower abdomen and thighs.  BONES: Extensive degenerative changes throughout the lumbar spine, stable. Stable compression fracture of T7.    In comparison to the recent study, a small right effusion has developed in addition to a small amount of ascites. Findings are suspicious for diverticulitis at the hepatic flexure.  No abscess or obvious perforation demonstrated. The nodularity seen at the right base on the prior study has almost completely resolved.  New small nodules are seen in the right middle lobe and left lower lobe.  These are most likely inflammatory given the relatively new appearance of these and the disappearance of other nodules.  Continued follow-up is recommended.. Signed by Cheko Wise MD    XR chest 1 view    Result Date: 7/15/2024  Interpreted By:  Roni Dwyer, STUDY: XR CHEST 1 VIEW;  7/15/2024 3:43 pm   INDICATION: Signs/Symptoms:new lij cvc, confirm placement and evaluate for ptx.   COMPARISON: Chest x-ray earlier today   ACCESSION NUMBER(S): [id number]   ORDERING CLINICIAN: TEETEE MOONEY   FINDINGS: Left IJ central line tip about 3 cm below the atrial caval junction. Pacemaker still present. The lungs appear clear. No visualized pleural effusion or pneumothorax. Suspect cardiomegaly. Mild pulmonary vascular congestion. Aortic atherosclerosis.       The tip of left IJ central line is in the right atrium. No apparent complication identified  post central line placement otherwise.   MACRO: None   Signed by: Roni Dwyer 7/15/2024 3:46 PM Dictation workstation:   [id number]    XR chest 1 view    Result Date: 7/15/2024  Interpreted By:  Jerry Santacruz, STUDY: XR CHEST 1 VIEW;  7/15/2024 11:14 am   INDICATION: Signs/Symptoms:Chest Pain.   COMPARISON: 06/28/2024   ACCESSION NUMBER(S): [id number]   ORDERING CLINICIAN: ANTONIA ANGLIN   FINDINGS: CHEST/LUNGS: The cardiac and mediastinal silhouettes are unchanged in size and configuration. Mild coarsening of the interstitial markings is unchanged. Inspiratory volume is suboptimal which results in crowding of the bronchovascular structures. Atelectasis at the lung bases. No definite new infiltrates. Blunting of the costophrenic angles may relate to trace effusions or pleural thickening.   UPPER ABDOMEN: No remarkable upper abdominal findings.   OSSEOUS STRUCTURES: No acute changes.       Trace effusions versus pleural thickening. No focal consolidation.   MACRO: None.   Signed by: Jerry Santacruz 7/15/2024 11:24 AM Dictation workstation:   [id number]      Assessment/Plan   Principal Problem:    Hypothermia, initial encounter    # Septic shock 2/2 diverticulitis, shock resolved  # LGIB, BRBPR  # Epistaxis, likely traumatic  # Acute blood loss anemia  # Recurrent secondary AI  -Afebrile.  Normotensive off vasopressor support at 120/72.  On Venturi mask.  -Chronic leukopenia.  Improved hemoglobin 6.6-7.4 s/p 1 unit PRBC.  Coagulation studies WNL.  Subcu heparin held.  No significant increase in BUN to creatinine ratio.  Low suspicion for brisk UGIB.  -On Zosyn for diverticulitis per ICU team  -In light of multiple bowel movements overnight with mucoid components, will entertain infectious stool workup to include C. difficile, stool PCR, O&P  -No indication for colonoscopy at this time given increased risk of perforation given diverticulitis. If rectal bleeding continues, recommend CTA for evaluation and possible IR  involvement. Cont. Conservative management at this time. Ensure hgb >7 and transfuse when indicated. May entertain emergent GI intervention for persistent, uncontrolled bleeding  -As it relates to PEG tube consideration: Corpak removed overnight 2/2 epistaxis. No prior reports of oropharyngeal/esophageal dysfunction. Was tolerating p.o. appropriately on day of discharge last admission 7/8/2024. No clear benefit to placing PEG tube at this time.  Furthermore, she is at increased risk of infection, poor wound healing, and question whether she would try to remove the tube.  Would encourage continued GOC/hospice discussions with patient and family.         Patient seen and discussed with attending physician    Yassine Win, DO  Internal Medicine, PGY-III    I was present with the Resident who participated in the documentation of this note. I have personally seen and re-examined the patient and performed the medical decision-making components (assessment and plan of care). I have reviewed the Resident's documentation and verified the findings in the note as written with additions or exceptions as stated in the body of this note.    Antelmo Shi, DO

## 2024-07-17 NOTE — PROGRESS NOTES
Physical Therapy                 Therapy Communication Note    Patient Name: Bing Holliday  MRN: [id number]  Today's Date: 7/17/2024     Discipline: Physical Therapy    Missed Visit Reason: Missed Visit Reason: Other (Comment)    Missed Time: Attempt    Comment: PT orders received, chart reviewed. Attempted PT eval, pt currently medically unstable for safe mobilization. Will re-attempt PT eval at a later time.

## 2024-07-18 ENCOUNTER — APPOINTMENT (OUTPATIENT)
Dept: CARDIOLOGY | Facility: HOSPITAL | Age: 64
DRG: 871 | End: 2024-07-18
Payer: MEDICARE

## 2024-07-18 LAB
ALBUMIN SERPL BCP-MCNC: 2.5 G/DL (ref 3.4–5)
ALBUMIN SERPL BCP-MCNC: 2.6 G/DL (ref 3.4–5)
ALP SERPL-CCNC: 104 U/L (ref 33–136)
ALT SERPL W P-5'-P-CCNC: 17 U/L (ref 7–45)
ANION GAP SERPL CALC-SCNC: 15 MMOL/L (ref 10–20)
ANION GAP SERPL CALC-SCNC: 15 MMOL/L (ref 10–20)
AST SERPL W P-5'-P-CCNC: 13 U/L (ref 9–39)
BASOPHILS # BLD MANUAL: 0 X10*3/UL (ref 0–0.1)
BASOPHILS NFR BLD MANUAL: 0 %
BILIRUB SERPL-MCNC: 0.5 MG/DL (ref 0–1.2)
BLOOD EXPIRATION DATE: NORMAL
BUN SERPL-MCNC: 30 MG/DL (ref 6–23)
BUN SERPL-MCNC: 31 MG/DL (ref 6–23)
BURR CELLS BLD QL SMEAR: ABNORMAL
CALCIUM SERPL-MCNC: 7.5 MG/DL (ref 8.6–10.3)
CALCIUM SERPL-MCNC: 7.8 MG/DL (ref 8.6–10.3)
CHLORIDE SERPL-SCNC: 113 MMOL/L (ref 98–107)
CHLORIDE SERPL-SCNC: 114 MMOL/L (ref 98–107)
CHLORIDE UR-SCNC: 27 MMOL/L
CHLORIDE/CREATININE (MMOL/G) IN URINE: 24 MMOL/G CREAT (ref 38–318)
CO2 SERPL-SCNC: 21 MMOL/L (ref 21–32)
CO2 SERPL-SCNC: 22 MMOL/L (ref 21–32)
CREAT SERPL-MCNC: 2.37 MG/DL (ref 0.5–1.05)
CREAT SERPL-MCNC: 2.48 MG/DL (ref 0.5–1.05)
CREAT UR-MCNC: 112.2 MG/DL (ref 20–320)
CREAT UR-MCNC: 112.2 MG/DL (ref 20–320)
DACRYOCYTES BLD QL SMEAR: ABNORMAL
DISPENSE STATUS: NORMAL
EGFRCR SERPLBLD CKD-EPI 2021: 21 ML/MIN/1.73M*2
EGFRCR SERPLBLD CKD-EPI 2021: 23 ML/MIN/1.73M*2
EOSINOPHIL # BLD MANUAL: 0 X10*3/UL (ref 0–0.7)
EOSINOPHIL NFR BLD MANUAL: 0 %
ERYTHROCYTE [DISTWIDTH] IN BLOOD BY AUTOMATED COUNT: 20.6 % (ref 11.5–14.5)
ERYTHROCYTE [DISTWIDTH] IN BLOOD BY AUTOMATED COUNT: 21 % (ref 11.5–14.5)
ERYTHROCYTE [DISTWIDTH] IN BLOOD BY AUTOMATED COUNT: 21.5 % (ref 11.5–14.5)
FIBRINOGEN PPP-MCNC: 245 MG/DL (ref 200–400)
GIANT PLATELETS BLD QL SMEAR: ABNORMAL
GLUCOSE BLD MANUAL STRIP-MCNC: 104 MG/DL (ref 74–99)
GLUCOSE BLD MANUAL STRIP-MCNC: 116 MG/DL (ref 74–99)
GLUCOSE BLD MANUAL STRIP-MCNC: 120 MG/DL (ref 74–99)
GLUCOSE BLD MANUAL STRIP-MCNC: 137 MG/DL (ref 74–99)
GLUCOSE BLD MANUAL STRIP-MCNC: 86 MG/DL (ref 74–99)
GLUCOSE SERPL-MCNC: 126 MG/DL (ref 74–99)
GLUCOSE SERPL-MCNC: 127 MG/DL (ref 74–99)
HCT VFR BLD AUTO: 21.8 % (ref 36–46)
HCT VFR BLD AUTO: 23.1 % (ref 36–46)
HCT VFR BLD AUTO: 25.7 % (ref 36–46)
HGB BLD-MCNC: 6.6 G/DL (ref 12–16)
HGB BLD-MCNC: 7.1 G/DL (ref 12–16)
HGB BLD-MCNC: 8 G/DL (ref 12–16)
HYPOCHROMIA BLD QL SMEAR: ABNORMAL
IMM GRANULOCYTES # BLD AUTO: 0.24 X10*3/UL (ref 0–0.7)
IMM GRANULOCYTES NFR BLD AUTO: 8.8 % (ref 0–0.9)
INR PPP: 1 (ref 0.9–1.1)
LYMPHOCYTES # BLD MANUAL: 0.38 X10*3/UL (ref 1.2–4.8)
LYMPHOCYTES NFR BLD MANUAL: 14 %
MAGNESIUM SERPL-MCNC: 1.98 MG/DL (ref 1.6–2.4)
MAGNESIUM SERPL-MCNC: 2.25 MG/DL (ref 1.6–2.4)
MCH RBC QN AUTO: 29.2 PG (ref 26–34)
MCH RBC QN AUTO: 29.3 PG (ref 26–34)
MCH RBC QN AUTO: 29.5 PG (ref 26–34)
MCHC RBC AUTO-ENTMCNC: 30.3 G/DL (ref 32–36)
MCHC RBC AUTO-ENTMCNC: 30.7 G/DL (ref 32–36)
MCHC RBC AUTO-ENTMCNC: 31.1 G/DL (ref 32–36)
MCV RBC AUTO: 94 FL (ref 80–100)
MCV RBC AUTO: 96 FL (ref 80–100)
MCV RBC AUTO: 97 FL (ref 80–100)
MONOCYTES # BLD MANUAL: 0.11 X10*3/UL (ref 0.1–1)
MONOCYTES NFR BLD MANUAL: 4 %
MYELOCYTES # BLD MANUAL: 0.05 X10*3/UL
MYELOCYTES NFR BLD MANUAL: 2 %
NEUTROPHILS # BLD MANUAL: 2.11 X10*3/UL (ref 1.2–7.7)
NEUTS BAND # BLD MANUAL: 0.11 X10*3/UL (ref 0–0.7)
NEUTS BAND NFR BLD MANUAL: 4 %
NEUTS SEG # BLD MANUAL: 2 X10*3/UL (ref 1.2–7)
NEUTS SEG NFR BLD MANUAL: 74 %
NRBC BLD-RTO: 2.8 /100 WBCS (ref 0–0)
NRBC BLD-RTO: 3.3 /100 WBCS (ref 0–0)
NRBC BLD-RTO: 4.7 /100 WBCS (ref 0–0)
OVALOCYTES BLD QL SMEAR: ABNORMAL
PHOSPHATE SERPL-MCNC: 4 MG/DL (ref 2.5–4.9)
PHOSPHATE SERPL-MCNC: 4.7 MG/DL (ref 2.5–4.9)
PLATELET # BLD AUTO: 60 X10*3/UL (ref 150–450)
PLATELET # BLD AUTO: 66 X10*3/UL (ref 150–450)
PLATELET # BLD AUTO: 73 X10*3/UL (ref 150–450)
POLYCHROMASIA BLD QL SMEAR: ABNORMAL
POTASSIUM SERPL-SCNC: 3.7 MMOL/L (ref 3.5–5.3)
POTASSIUM SERPL-SCNC: 4 MMOL/L (ref 3.5–5.3)
PRODUCT BLOOD TYPE: 5100
PRODUCT CODE: NORMAL
PROT SERPL-MCNC: 4.5 G/DL (ref 6.4–8.2)
PROTHROMBIN TIME: 11.7 SECONDS (ref 9.8–12.8)
RBC # BLD AUTO: 2.25 X10*6/UL (ref 4–5.2)
RBC # BLD AUTO: 2.41 X10*6/UL (ref 4–5.2)
RBC # BLD AUTO: 2.74 X10*6/UL (ref 4–5.2)
RBC MORPH BLD: ABNORMAL
SODIUM SERPL-SCNC: 145 MMOL/L (ref 136–145)
SODIUM SERPL-SCNC: 147 MMOL/L (ref 136–145)
SODIUM UR-SCNC: 20 MMOL/L
SODIUM/CREAT UR-RTO: 18 MMOL/G CREAT
TOTAL CELLS COUNTED BLD: 100
UNIT ABO: NORMAL
UNIT NUMBER: NORMAL
UNIT RH: NORMAL
UNIT VOLUME: 333
VARIANT LYMPHS # BLD MANUAL: 0.05 X10*3/UL (ref 0–0.5)
VARIANT LYMPHS NFR BLD: 2 %
WBC # BLD AUTO: 2.7 X10*3/UL (ref 4.4–11.3)
WBC # BLD AUTO: 2.8 X10*3/UL (ref 4.4–11.3)
WBC # BLD AUTO: 4.5 X10*3/UL (ref 4.4–11.3)
XM INTEP: NORMAL

## 2024-07-18 PROCEDURE — 93010 ELECTROCARDIOGRAM REPORT: CPT | Performed by: INTERNAL MEDICINE

## 2024-07-18 PROCEDURE — 84100 ASSAY OF PHOSPHORUS: CPT

## 2024-07-18 PROCEDURE — 2500000004 HC RX 250 GENERAL PHARMACY W/ HCPCS (ALT 636 FOR OP/ED): Performed by: INTERNAL MEDICINE

## 2024-07-18 PROCEDURE — 83735 ASSAY OF MAGNESIUM: CPT

## 2024-07-18 PROCEDURE — 93005 ELECTROCARDIOGRAM TRACING: CPT

## 2024-07-18 PROCEDURE — 37799 UNLISTED PX VASCULAR SURGERY: CPT

## 2024-07-18 PROCEDURE — 99291 CRITICAL CARE FIRST HOUR: CPT

## 2024-07-18 PROCEDURE — 2500000001 HC RX 250 WO HCPCS SELF ADMINISTERED DRUGS (ALT 637 FOR MEDICARE OP)

## 2024-07-18 PROCEDURE — 2500000005 HC RX 250 GENERAL PHARMACY W/O HCPCS: Performed by: INTERNAL MEDICINE

## 2024-07-18 PROCEDURE — 94640 AIRWAY INHALATION TREATMENT: CPT

## 2024-07-18 PROCEDURE — 2020000001 HC ICU ROOM DAILY

## 2024-07-18 PROCEDURE — C9113 INJ PANTOPRAZOLE SODIUM, VIA: HCPCS

## 2024-07-18 PROCEDURE — 2500000002 HC RX 250 W HCPCS SELF ADMINISTERED DRUGS (ALT 637 FOR MEDICARE OP, ALT 636 FOR OP/ED): Performed by: INTERNAL MEDICINE

## 2024-07-18 PROCEDURE — 2500000004 HC RX 250 GENERAL PHARMACY W/ HCPCS (ALT 636 FOR OP/ED)

## 2024-07-18 PROCEDURE — 85027 COMPLETE CBC AUTOMATED: CPT | Performed by: INTERNAL MEDICINE

## 2024-07-18 PROCEDURE — 80053 COMPREHEN METABOLIC PANEL: CPT

## 2024-07-18 PROCEDURE — P9016 RBC LEUKOCYTES REDUCED: HCPCS

## 2024-07-18 PROCEDURE — 85027 COMPLETE CBC AUTOMATED: CPT

## 2024-07-18 PROCEDURE — 99233 SBSQ HOSP IP/OBS HIGH 50: CPT | Performed by: OTOLARYNGOLOGY

## 2024-07-18 PROCEDURE — 85007 BL SMEAR W/DIFF WBC COUNT: CPT

## 2024-07-18 PROCEDURE — 82436 ASSAY OF URINE CHLORIDE: CPT | Performed by: INTERNAL MEDICINE

## 2024-07-18 PROCEDURE — 36430 TRANSFUSION BLD/BLD COMPNT: CPT

## 2024-07-18 PROCEDURE — 99222 1ST HOSP IP/OBS MODERATE 55: CPT | Performed by: INTERNAL MEDICINE

## 2024-07-18 PROCEDURE — 99234 HOSP IP/OBS SM DT SF/LOW 45: CPT

## 2024-07-18 PROCEDURE — 82947 ASSAY GLUCOSE BLOOD QUANT: CPT

## 2024-07-18 PROCEDURE — 85610 PROTHROMBIN TIME: CPT | Performed by: INTERNAL MEDICINE

## 2024-07-18 PROCEDURE — 85384 FIBRINOGEN ACTIVITY: CPT | Performed by: INTERNAL MEDICINE

## 2024-07-18 PROCEDURE — 30903 CONTROL OF NOSEBLEED: CPT | Performed by: OTOLARYNGOLOGY

## 2024-07-18 PROCEDURE — 80069 RENAL FUNCTION PANEL: CPT | Mod: CCI

## 2024-07-18 PROCEDURE — 84300 ASSAY OF URINE SODIUM: CPT | Performed by: INTERNAL MEDICINE

## 2024-07-18 RX ORDER — DEXTROSE MONOHYDRATE 50 MG/ML
50 INJECTION, SOLUTION INTRAVENOUS CONTINUOUS
Status: ACTIVE | OUTPATIENT
Start: 2024-07-18 | End: 2024-07-19

## 2024-07-18 RX ORDER — POTASSIUM CHLORIDE 14.9 MG/ML
20 INJECTION INTRAVENOUS ONCE
Status: COMPLETED | OUTPATIENT
Start: 2024-07-18 | End: 2024-07-19

## 2024-07-18 RX ORDER — SILVER NITRATE 38.21; 12.74 MG/1; MG/1
STICK TOPICAL ONCE
Status: COMPLETED | OUTPATIENT
Start: 2024-07-18 | End: 2024-07-18

## 2024-07-18 ASSESSMENT — COGNITIVE AND FUNCTIONAL STATUS - GENERAL
DAILY ACTIVITIY SCORE: 6
WALKING IN HOSPITAL ROOM: TOTAL
TOILETING: TOTAL
DRESSING REGULAR LOWER BODY CLOTHING: TOTAL
MOVING FROM LYING ON BACK TO SITTING ON SIDE OF FLAT BED WITH BEDRAILS: A LOT
PERSONAL GROOMING: TOTAL
TURNING FROM BACK TO SIDE WHILE IN FLAT BAD: TOTAL
MOVING TO AND FROM BED TO CHAIR: TOTAL
CLIMB 3 TO 5 STEPS WITH RAILING: TOTAL
STANDING UP FROM CHAIR USING ARMS: TOTAL
MOBILITY SCORE: 7
HELP NEEDED FOR BATHING: TOTAL
DRESSING REGULAR UPPER BODY CLOTHING: TOTAL
EATING MEALS: TOTAL

## 2024-07-18 ASSESSMENT — PAIN - FUNCTIONAL ASSESSMENT
PAIN_FUNCTIONAL_ASSESSMENT: 0-10
PAIN_FUNCTIONAL_ASSESSMENT: CPOT (CRITICAL CARE PAIN OBSERVATION TOOL)
PAIN_FUNCTIONAL_ASSESSMENT: 0-10
PAIN_FUNCTIONAL_ASSESSMENT: CPOT (CRITICAL CARE PAIN OBSERVATION TOOL)
PAIN_FUNCTIONAL_ASSESSMENT: 0-10
PAIN_FUNCTIONAL_ASSESSMENT: CPOT (CRITICAL CARE PAIN OBSERVATION TOOL)

## 2024-07-18 ASSESSMENT — PAIN SCALES - GENERAL
PAINLEVEL_OUTOF10: 0 - NO PAIN

## 2024-07-18 NOTE — PROGRESS NOTES
Nutrition Follow Up Assessment:   Nutrition Assessment    Reason for Assessment: Provider consult order    Patient History: Bing Holliday is a 63 y.o. female presenting to ED and admitted to ICU due to hypotension, hypoglycemia, and hypothermia. CT showing small right effusion, small ascites, and diverticulitis.  GI consulted to evaluate noting uncomplicated diverticulitis and recommending atb and outpatient colonoscopy pending GOC.     Most recent admit here was from 6/28-7/9 for AMS and abnormal labs with multifactorial sepsis and adrenal insufficiency.  She has been admitted every month since March 2024.  She has been to a number of nursing facilities and prior to last admit she was at [facility] and continues to get SNF care there.    7/18/24 Follow up note -  Chart reviewed and events noted.  Since last review HPOA had agreed to placement of corpak.  Placement was attempted on 7/16 with refusal from patient.  Pt had otolaryngology consult after corpak did get placed followed by nose bleed and removal of corpak.  Bright red bms noted yesterday per nursing notation.  GI recommends CTA but patient JED too severe at this time. Pt off pressor support since last review. Per GI notation there is no clear benefit to pursing PEG placement at this time, see note on 7/17 for details.       Past Medical History: SLE, lupus cerebritis, RA, Raynaud's syndrome, hyperparathyroidism, adrenal insufficiency, COPD, DM, HTN, HLD, atrial fibrillation (not on anticoagulation), CKD, pacemaker, seizures, psychosis     Nutrition History:  Energy Intake:  (NPO)  Food and Nutrient History: Pt unable to provide information so far this admit.  No family present.  Pt had been fed by corpak last admission however this was removed as she was tolerating a PO diet.  Diet at SNF was LCS, regular texture per orders.  Spoke with nurse from SNF who noted that pt has not eaten much whatsoever since the day she was admitted and often  "holds meds in her mouth for 15-20min before swallowing and overall has difficulty swallowing with meds and food.  Food Allergies/Intolerances:  None  GI Symptoms: Abdominal pain and bloody stools  Oral Problems: Chewing difficulty and Swallowing difficulty       Current Diet: NPO Diet; Effective now    Anthropometrics:  Height: 165.1 cm (5' 5\")   Weight: 98 kg (216 lb 0.8 oz)   BMI (Calculated): 35.95             Weight Change %:  Weight History / % Weight Change: Weight history shows fluctuation probably in relation to edema and variable PO intakes. Last 6 months wt range has been 190-224 lbs per chart.  Significant Weight Loss: Yes    Pain Assessment: 0-10  0-10 (Numeric) Pain Score: 0 - No pain      Nutrition Significant Labs:  BMP Trend:   Results from last 7 days   Lab Units 07/18/24  0416 07/17/24  1430 07/17/24  0250 07/16/24  0252   GLUCOSE mg/dL 127* 146* 145* 129*   CALCIUM mg/dL 7.5* 7.7* 7.5* 7.9*   SODIUM mmol/L 145 141 141 142   POTASSIUM mmol/L 4.0 4.2 4.5 4.5   CO2 mmol/L 21 21 21 20*   CHLORIDE mmol/L 113* 110* 111* 112*   BUN mg/dL 31* 31* 31* 26*   CREATININE mg/dL 2.48* 2.62* 2.58* 2.32*    , A1C:  Lab Results   Component Value Date    HGBA1C 6.8 (H) 06/04/2024       Nutrition Specific Medications:  Scheduled medications  [Held by provider] atorvastatin, 40 mg, oral, Nightly  budesonide, 0.5 mg, nebulization, BID  [Held by provider] docusate sodium, 100 mg, oral, BID  [Held by provider] folic acid, 1 mg, nasogastric tube, Daily  formoterol, 20 mcg, nebulization, BID  [Held by provider] heparin (porcine), 5,000 Units, subcutaneous, q8h  hydrocortisone sodium succinate, 50 mg, intravenous, q8h  insulin lispro, 0-5 Units, subcutaneous, q4h  ipratropium-albuteroL, 3 mL, nebulization, TID  levETIRAcetam, 500 mg, intravenous, q12h  [Held by provider] melatonin, 5 mg, oral, Nightly  [Held by provider] mycophenolate, 1,000 mg, oral, BID  pantoprazole, 40 mg, intravenous, BID  piperacillin-tazobactam, " 3.375 g, intravenous, q8h  [Held by provider] polyethylene glycol, 17 g, oral, Daily  [Held by provider] risperiDONE, 3 mg, nasogastric tube, BID  [Held by provider] sertraline, 25 mg, nasogastric tube, Nightly  [Held by provider] thiamine, 100 mg, nasogastric tube, Daily      Nutrition Focused Physical Exam Findings:  defer: below from initial assessment  Subcutaneous Fat Loss:   Orbital Fat Pads: Mild-Moderate (slight dark circles and slight hollowing)  Buccal Fat Pads: Mild-Moderate (flat cheeks, minimal bounce)  Triceps: Defer  Muscle Wasting:  Temporalis: Mild-Moderate (slight depression)  Pectoralis (Clavicular Region): Mild-Moderate (some protrusion of clavicle)  Deltoid/Trapezius: Mild-Moderate (slight protrusion of acromion process)  Interosseous: Defer  Trapezius/Infraspinatus/Supraspinatus (Scapular Region): Defer  Quadriceps: Defer  Gastrocnemius: Defer  Edema:  Edema: none  Physical Findings:  Hair: Negative  Eyes: Negative  Mouth: Negative  Nails: Negative  Skin: Negative    I/O:   Last BM Date: 07/17/24; Stool Appearance: Watery (07/17/24 2200)     Estimated Needs:   Total Energy Estimated Needs (kCal):  (2002 kcal (MSJ x 1.1 x 1.2) or 22.8 kcal/kg)  Total Protein Estimated Needs (g):  (88-105g protein (1.0-1.2 g/kg))  Total Fluid Estimated Needs (mL):  (1mL/kcal/d or as per physician)          Nutrition Diagnosis   Malnutrition Diagnosis  Patient has Malnutrition Diagnosis: Yes  Diagnosis Status: New  Malnutrition Diagnosis: Moderate malnutrition related to chronic disease or condition  As Evidenced by: acute on chronic nutritional stres with indicators including NFPE showing moderate muscle wasting/fat loss, unintentional weight loss, and intakes meeting <75% of needs for > 1 month.            Nutrition Interventions/Recommendations         Nutrition Prescription:  Continue NPO status at this time, multiple disciplines indicating pt poor candidate for PEG especially given current GI complications  including bleeding and diverticulitis. Corpak placement was not successful nor is a long term solution to pt's nutrition status.  Revaluate been form of nutrition support on follow up if pt remains NPO on day 5 of admission.  Will monitor what comes of GOC discussion as even PPN/TPN would not provide any benefit or indicated for this patient either.       Nutrition Monitoring and Evaluation   Food/Nutrient Related History Monitoring  Monitoring and Evaluation Plan: Enteral and parenteral nutrition intake  Enteral and Parenteral Nutrition Intake: Enteral nutrition intake  Criteria: Pt will tolerate goal rate within 24-48hrs once initiated         Biochemical Data, Medical Tests and Procedures  Monitoring and Evaluation Plan: Electrolyte/renal panel  Electrolyte and Renal Panel: Phosphorus, Magnesium, Chloride, Sodium, BUN  Criteria: Maintain within acceptable range            Time Spent/Follow-up Reminder:   Time Spent (min): 45 minutes  Last Date of Nutrition Visit: 07/18/24  Nutrition Follow-Up Needed?: 3-5 days  Follow up Comment: LUANNE ANDERSON recs in however failed attempt at corpak placement and pt deemed poor candidate for PEG

## 2024-07-18 NOTE — CONSULTS
Reason For Consult  pancytopenia    History Of Present Illness  Bing Holliday is a 63 y.o. female presenting to the emergency room on 7/15/2024 with shock suspected from 6 adrenal insufficiency.  Patient has longstanding history of lupus, lupus cerebritis, rheumatoid arthritis, adrenal cortical insufficiency, diabetes, hypertension, A-fib.  She was found to be hypothermic after presenting with altered mental status.  She has a history of adrenal insufficiency dependent on hydrocortisone and Florinef.  She was started on Zosyn empiric [place] and on hydrochloric sown and Florinef.  It was found that her prior urine was positive for Enterococcus VCM sensitive to linezolid .  Hematology consulted for pancytopenia.  Blood cell count this morning was 2.8 yesterday was 2.7.  Hemoglobin 7.1 platelets 60,000 have ranged from 60-75,000.  Counts were low prior to this admission where white blood cell count was previously normal however hemoglobin has been persistently 6-8 and platelets as low as 40,000.  He has not seen hematology.  During this admission has seen by ENT for persistent epistaxis.  Followed by gastroenterology for concern of GI bleed liver no bloody bowel movements overnight.  Stool PCR and C. difficile are negative and conservative management recommended.          Past Medical History  She has a past medical history of Abnormal kidney function (04/04/2023), Acute headache (03/10/2024), Acute iritis of right eye (07/24/2015), Acute low back pain (10/30/2023), Acute upper respiratory infection, unspecified (03/04/2020), Acute upper respiratory infection, unspecified (09/30/2015), Arthralgia of right knee (02/14/2024), Arthritis, Atypical facial pain (03/10/2024), Body mass index (BMI) 23.0-23.9, adult (10/15/2021), Body mass index (BMI) 33.0-33.9, adult (03/04/2020), Cardiomegaly (08/27/2013), Chest pain (06/10/2022), Chronic kidney disease, stage 3 unspecified (Multi) (07/02/2013), Confusional state  (03/10/2024), Contact with and (suspected) exposure to covid-19 (04/04/2023), Delirium (03/10/2024), Disease of pericardium, unspecified ([facility]) (07/02/2013), Encounter for follow-up examination after completed treatment for conditions other than malignant neoplasm (10/06/2022), Generalized contraction of visual field, right eye (01/29/2015), Hearing loss (03/10/2024), History of cataract (03/10/2024), History of thrombocytopenia (03/10/2024), Homonymous bilateral field defects, right side (04/29/2016), Hypertensive chronic kidney disease with stage 1 through stage 4 chronic kidney disease, or unspecified chronic kidney disease (07/02/2013), Laceration without foreign body, left foot, initial encounter (07/03/2018), Localized edema (03/10/2024), Localized, primary osteoarthritis (02/14/2024), Mass of skin (03/10/2024), Migraine with aura, not intractable, without status migrainosus (10/24/2022), Open wound (03/10/2024), Open wound of left foot (03/10/2024), Other conditions influencing health status (07/02/2013), Other conditions influencing health status (07/02/2013), Other conditions influencing health status (07/02/2013), Other conditions influencing health status (05/22/2015), Other conditions influencing health status (10/24/2022), Other conditions influencing health status (03/14/2022), Other long term (current) drug therapy (10/24/2022), Overweight with body mass index (BMI) 25.0-29.9 (03/10/2024), Pain of toe (03/10/2024), Personal history of COVID-19 (04/04/2023), Personal history of diseases of the blood and blood-forming organs and certain disorders involving the immune mechanism (07/02/2013), Personal history of diseases of the skin and subcutaneous tissue (08/11/2015), Personal history of nephrotic syndrome (07/02/2013), Personal history of other diseases of the circulatory system (08/27/2013), Personal history of other diseases of the nervous system and sense organs, Personal history of other  diseases of the respiratory system, Personal history of other infectious and parasitic diseases (07/02/2013), Personal history of other specified conditions, Personal history of other specified conditions (08/27/2013), Posterior epistaxis (03/10/2024), Puckering of macula, right eye (10/24/2022), Raynaud's syndrome without gangrene (07/02/2013), Sinusitis (03/10/2024), Systemic lupus erythematosus, unspecified (Multi) (07/24/2015), Systemic lupus erythematosus, unspecified (Multi) (07/24/2015), Systemic lupus erythematosus, unspecified (Multi) (07/24/2015), 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) (07/02/2013), Type 2 diabetes mellitus with mild nonproliferative diabetic retinopathy without macular edema, left eye (Multi) (07/27/2015), Type 2 diabetes mellitus with mild nonproliferative diabetic retinopathy without macular edema, unspecified eye (Multi) (07/24/2015), Type 2 diabetes mellitus with proliferative diabetic retinopathy without macular edema, right eye (Multi) (07/27/2015), Type 2 diabetes mellitus with proliferative diabetic retinopathy without macular edema, unspecified eye (Multi) (07/24/2015), Unspecified acute and subacute iridocyclitis (07/24/2015), and Unspecified open wound, left foot, sequela (07/03/2018).    Surgical History  She has a past surgical history that includes Eye surgery (03/06/2015); Total hip arthroplasty (01/29/2015); Cholecystectomy (01/29/2015); Other surgical history (01/29/2015); Other surgical history (01/29/2015); Ankle surgery (01/29/2015); Foot surgery (01/29/2015); MR angio head wo IV contrast (7/26/2013); MR angio neck wo IV contrast (7/26/2013); CT guided percutaneous biopsy bone deep (5/4/2021); MR angio head wo IV contrast (9/17/2021); MR angio neck wo IV contrast (9/17/2021); MR angio neck wo IV contrast (3/25/2023); MR angio head wo IV contrast (3/25/2023); and Cardiac electrophysiology procedure (Left, 5/17/2024).     Social History  She reports  "that she has never smoked. She has never been exposed to tobacco smoke. She has never used smokeless tobacco. She reports that she does not currently use alcohol. She reports that she does not use drugs.    Family History  Family History   Problem Relation Name Age of Onset    Other (RENAL DISEASE) Mother          END STAGE    Other (CARDIAC DISORDER) Mother      Cataracts Mother      Stroke Mother      Diabetes Mother      Kidney disease Mother      Lupus Mother      Other (CARDIAC DISORDER) Father      COPD Father      Glaucoma Father      Hypertension Father      Sleep apnea Father      Other (CARDIAC DISORDER) Sister      Depression Sister      Kidney disease Sister      Sickle cell trait Sister      Sleep apnea Daughter          Allergies  Ace inhibitors, Hydroxychloroquine, Lisinopril, and Sulfa (sulfonamide antibiotics)    Review of Systems  Negative outside of HPI   Physical Exam  Gen: NAD  HEENT: atraumatic head, normocephalic, EOMI, conjuctiva normal  LUNG: no increased WOB  CV: No JVD. RRR  GI: soft, NT, ND  LE: no LE edema  Skin: no obvious rashes or lesions  Neuro: interactive, no focal deficits, limited in setting of acute illness  Psych: difficult to participate, normal affect    Last Recorded Vitals  Blood pressure 135/77, pulse 96, temperature 36 °C (96.8 °F), resp. rate 15, height 1.651 m (5' 5\"), weight 98 kg (216 lb 0.8 oz), SpO2 94%.    Relevant Results  Results for orders placed or performed during the hospital encounter of 07/15/24 (from the past 24 hour(s))   POCT GLUCOSE   Result Value Ref Range    POCT Glucose 140 (H) 74 - 99 mg/dL   CBC and Auto Differential   Result Value Ref Range    WBC 2.7 (L) 4.4 - 11.3 x10*3/uL    nRBC 3.3 (H) 0.0 - 0.0 /100 WBCs    RBC 2.25 (L) 4.00 - 5.20 x10*6/uL    Hemoglobin 6.6 (L) 12.0 - 16.0 g/dL    Hematocrit 21.8 (L) 36.0 - 46.0 %    MCV 97 80 - 100 fL    MCH 29.3 26.0 - 34.0 pg    MCHC 30.3 (L) 32.0 - 36.0 g/dL    RDW 21.0 (H) 11.5 - 14.5 %    Platelets 66 " (L) 150 - 450 x10*3/uL    Immature Granulocytes %, Automated 8.8 (H) 0.0 - 0.9 %    Immature Granulocytes Absolute, Automated 0.24 0.00 - 0.70 x10*3/uL   Manual Differential   Result Value Ref Range    Neutrophils %, Manual 74.0 40.0 - 80.0 %    Bands %, Manual 4.0 0.0 - 5.0 %    Lymphocytes %, Manual 14.0 13.0 - 44.0 %    Monocytes %, Manual 4.0 2.0 - 10.0 %    Eosinophils %, Manual 0.0 0.0 - 6.0 %    Basophils %, Manual 0.0 0.0 - 2.0 %    Atypical Lymphocytes %, Manual 2.0 0.0 - 2.0 %    Myelocytes %, Manual 2.0 0.0 - 0.0 %    Seg Neutrophils Absolute, Manual 2.00 1.20 - 7.00 x10*3/uL    Bands Absolute, Manual 0.11 0.00 - 0.70 x10*3/uL    Lymphocytes Absolute, Manual 0.38 (L) 1.20 - 4.80 x10*3/uL    Monocytes Absolute, Manual 0.11 0.10 - 1.00 x10*3/uL    Eosinophils Absolute, Manual 0.00 0.00 - 0.70 x10*3/uL    Basophils Absolute, Manual 0.00 0.00 - 0.10 x10*3/uL    Atypical Lymphs Absolute, Manual 0.05 0.00 - 0.50 x10*3/uL    Myelocytes Absolute, Manual 0.05 0.00 - 0.00 x10*3/uL    Total Cells Counted 100     Neutrophils Absolute, Manual 2.11 1.20 - 7.70 x10*3/uL    RBC Morphology See Below     Polychromasia Mild     Hypochromia Marked     Ovalocytes Few     Teardrop Cells Few     Katarzyna Cells Few     Giant Platelets Few    POCT GLUCOSE   Result Value Ref Range    POCT Glucose 137 (H) 74 - 99 mg/dL   Prepare RBC: 1 Units   Result Value Ref Range    PRODUCT CODE F3605H89     Unit Number [id number]     Unit ABO O     Unit RH POS     XM INTEP COMP     Dispense Status TR     Blood Expiration Date 8/15/2024 11:59:00 PM EDT     PRODUCT BLOOD TYPE 5100     UNIT VOLUME 333    CBC   Result Value Ref Range    WBC 2.8 (L) 4.4 - 11.3 x10*3/uL    nRBC 2.8 (H) 0.0 - 0.0 /100 WBCs    RBC 2.41 (L) 4.00 - 5.20 x10*6/uL    Hemoglobin 7.1 (L) 12.0 - 16.0 g/dL    Hematocrit 23.1 (L) 36.0 - 46.0 %    MCV 96 80 - 100 fL    MCH 29.5 26.0 - 34.0 pg    MCHC 30.7 (L) 32.0 - 36.0 g/dL    RDW 20.6 (H) 11.5 - 14.5 %    Platelets 60 (L)  150 - 450 x10*3/uL   Comprehensive Metabolic Panel   Result Value Ref Range    Glucose 127 (H) 74 - 99 mg/dL    Sodium 145 136 - 145 mmol/L    Potassium 4.0 3.5 - 5.3 mmol/L    Chloride 113 (H) 98 - 107 mmol/L    Bicarbonate 21 21 - 32 mmol/L    Anion Gap 15 10 - 20 mmol/L    Urea Nitrogen 31 (H) 6 - 23 mg/dL    Creatinine 2.48 (H) 0.50 - 1.05 mg/dL    eGFR 21 (L) >60 mL/min/1.73m*2    Calcium 7.5 (L) 8.6 - 10.3 mg/dL    Albumin 2.5 (L) 3.4 - 5.0 g/dL    Alkaline Phosphatase 104 33 - 136 U/L    Total Protein 4.5 (L) 6.4 - 8.2 g/dL    AST 13 9 - 39 U/L    Bilirubin, Total 0.5 0.0 - 1.2 mg/dL    ALT 17 7 - 45 U/L   Magnesium   Result Value Ref Range    Magnesium 1.98 1.60 - 2.40 mg/dL   Phosphorus   Result Value Ref Range    Phosphorus 4.7 2.5 - 4.9 mg/dL   POCT GLUCOSE   Result Value Ref Range    POCT Glucose 116 (H) 74 - 99 mg/dL   POCT GLUCOSE   Result Value Ref Range    POCT Glucose 86 74 - 99 mg/dL   POCT GLUCOSE   Result Value Ref Range    POCT Glucose 104 (H) 74 - 99 mg/dL     *Note: Due to a large number of results and/or encounters for the requested time period, some results have not been displayed. A complete set of results can be found in Results Review.     '     Assessment/Plan     Septic shock 2/2 adrenal insufficiency  UTI  Epistaxis   4.  Pancytopenia      Patient has baseline low platelets and anemia likely secondary to her lupus and chronic inflammation.  However further drop this admission likely multifactorial in the setting of acute illness long with antibiotics use including Zosyn.  She could also have some shock marrow from her recent shock and will take some time for marrow to recover.  Suspicion of underlying thrombi microangiopathic process given chronicity and other medical problems at this time however ultimately given her longstanding cytopenia she would benefit from a bone marrow biopsy however does not need to be completed inpatient would recommend allowing for 4-week recovery and  outpatient bone marrow biopsy will arrange for a follow-up in 4 weeks for repeat labs and assessment if further workup needed.    - Patient previously followed by hematology per son and had bone marrow biopsy we will retrieve results  - will consider bone marrow biopsy after acute illness as outpatient however no further hematologic workup recommended inpatient  - will arrange followup in 4 week to assess      I spent 60 minutes in the professional and overall care of this patient.      Samanta Ramesh MD

## 2024-07-18 NOTE — CARE PLAN
The clinical goals for the shift include Pt will remain HDS this shift.      Problem: Skin  Goal: Decreased wound size/increased tissue granulation at next dressing change  Outcome: Progressing  Flowsheets (Taken 7/18/2024 1843)  Decreased wound size/increased tissue granulation at next dressing change:   Promote sleep for wound healing   Utilize specialty bed per algorithm  Goal: Participates in plan/prevention/treatment measures  Outcome: Progressing  Flowsheets (Taken 7/18/2024 1843)  Participates in plan/prevention/treatment measures:   Discuss with provider PT/OT consult   Elevate heels  Goal: Prevent/manage excess moisture  Outcome: Progressing  Flowsheets (Taken 7/18/2024 1843)  Prevent/manage excess moisture:   Cleanse incontinence/protect with barrier cream   Follow provider orders for dressing changes  Goal: Prevent/minimize sheer/friction injuries  Outcome: Progressing  Flowsheets (Taken 7/18/2024 1843)  Prevent/minimize sheer/friction injuries:   Turn/reposition every 2 hours/use positioning/transfer devices   Use pull sheet  Goal: Promote/optimize nutrition  Outcome: Progressing  Flowsheets (Taken 7/18/2024 1843)  Promote/optimize nutrition: Discuss with provider if NPO > 2 days  Goal: Promote skin healing  Outcome: Progressing  Flowsheets (Taken 7/18/2024 1843)  Promote skin healing:   Assess skin/pad under line(s)/device(s)   Turn/reposition every 2 hours/use positioning/transfer devices   Protective dressings over bony prominences   Rotate device position/do not position patient on device   Ensure correct size (line/device) and apply per  instructions     Problem: Fall/Injury  Goal: Not fall by end of shift  Outcome: Progressing  Goal: Be free from injury by end of the shift  Outcome: Progressing  Goal: Verbalize understanding of personal risk factors for fall in the hospital  Outcome: Progressing  Goal: Verbalize understanding of risk factor reduction measures to prevent injury from fall  in the home  Outcome: Progressing  Goal: Use assistive devices by end of the shift  Outcome: Progressing  Goal: Pace activities to prevent fatigue by end of the shift  Outcome: Progressing     Problem: Pain - Adult  Goal: Verbalizes/displays adequate comfort level or baseline comfort level  Outcome: Progressing     Problem: Safety - Adult  Goal: Free from fall injury  Outcome: Progressing     Problem: Discharge Planning  Goal: Discharge to home or other facility with appropriate resources  Outcome: Progressing     Problem: Chronic Conditions and Co-morbidities  Goal: Patient's chronic conditions and co-morbidity symptoms are monitored and maintained or improved  Outcome: Progressing     Problem: Diabetes  Goal: Achieve decreasing blood glucose levels by end of shift  Outcome: Progressing  Goal: Increase stability of blood glucose readings by end of shift  Outcome: Progressing  Goal: Decrease in ketones present in urine by end of shift  Outcome: Progressing  Goal: Maintain electrolyte levels within acceptable range throughout shift  Outcome: Progressing  Goal: Maintain glucose levels >70mg/dl to <250mg/dl throughout shift  Outcome: Progressing  Goal: No changes in neurological exam by end of shift  Outcome: Progressing  Goal: Learn about and adhere to nutrition recommendations by end of shift  Outcome: Progressing  Goal: Vital signs within normal range for age by end of shift  Outcome: Progressing  Goal: Increase self care and/or family involovement by end of shift  Outcome: Progressing  Goal: Receive DSME education by end of shift  Outcome: Progressing

## 2024-07-18 NOTE — PROGRESS NOTES
Physical Therapy                 Therapy Communication Note    Patient Name: Bing Holliday  MRN: [id number]  Today's Date: 7/18/2024     Discipline: Physical Therapy    Missed Visit Reason: Missed Visit Reason:  (Pt wearing venturi mask, and noticed red blood in her mask. Notified nursing at 2:30 pm)

## 2024-07-18 NOTE — PROGRESS NOTES
Critical Care Daily Progress        Subjective   Patient is a 63 y.o. female admitted on 7/15/2024 10:13 AM to the ICU for shock secondary to adrenal sufficiency and/or sepsis.    Overnight Events:   Hemoglobin 6.6 overnight, received another unit of blood, repeat hemoglobin 7.1.  Had a brown bowel movement overnight.  Family meeting yesterday.  Patient's son and daughter-in-law would like to continue treatment for now as they are hopeful that she will improve.  They are already connected with [facility] palliative care services, which does have hospice capabilities.  However, they are not ready to sign with hospice yet, but open to hearing about hospice options.    Patient seen and examined at bedside this morning.  She is more awake, able to answer some questions.  Still following commands.  She is asking for food.  We will have speech evaluate her for a formal swallow assessment.    Scheduled Medications:   [Held by provider] atorvastatin, 40 mg, oral, Nightly  budesonide, 0.5 mg, nebulization, BID  [Held by provider] docusate sodium, 100 mg, oral, BID  [Held by provider] folic acid, 1 mg, nasogastric tube, Daily  formoterol, 20 mcg, nebulization, BID  [Held by provider] heparin (porcine), 5,000 Units, subcutaneous, q8h  hydrocortisone sodium succinate, 50 mg, intravenous, q8h  insulin lispro, 0-5 Units, subcutaneous, q4h  ipratropium-albuteroL, 3 mL, nebulization, TID  levETIRAcetam, 500 mg, intravenous, q12h  [Held by provider] melatonin, 5 mg, oral, Nightly  [Held by provider] mycophenolate, 1,000 mg, oral, BID  pantoprazole, 40 mg, intravenous, BID  piperacillin-tazobactam, 3.375 g, intravenous, q8h  [Held by provider] polyethylene glycol, 17 g, oral, Daily  [Held by provider] risperiDONE, 3 mg, nasogastric tube, BID  [Held by provider] sertraline, 25 mg, nasogastric tube, Nightly  [Held by provider] thiamine, 100 mg, nasogastric tube, Daily         Continuous Medications:   norepinephrine, 0-0.2 mcg/kg/min,  Last Rate: Stopped (07/16/24 2005)         PRN Medications:   PRN medications: [Held by provider] acetaminophen, alteplase, dextrose, dextrose, glucagon, glucagon, ipratropium-albuteroL, magnesium sulfate, oxymetazoline, potassium chloride    Objective   Vitals:  Temp  Min: 36 °C (96.8 °F)  Max: 36.6 °C (97.9 °F)  Pulse  Min: 62  Max: 98  BP  Min: 126/71  Max: 126/71  Resp  Min: 9  Max: 31  SpO2  Min: 91 %  Max: 100 %    Physical Exam:   Physical Exam   Physical Exam  Vitals and nursing note reviewed.   Constitutional:       Appearance: She is well-developed. She is ill-appearing.   HENT:      Head: Normocephalic and atraumatic.      Right Ear: External ear normal.      Left Ear: External ear normal.      Nose:      Comments: Bilateral nasal packing, small amount of old blood around the nostrils.     Mouth/Throat:      Mouth: Mucous membranes are dry.   Eyes:      General: No scleral icterus.     Extraocular Movements: Extraocular movements intact.      Conjunctiva/sclera: Conjunctivae normal.      Pupils: Pupils are equal, round, and reactive to light.   Cardiovascular:      Rate and Rhythm: Regular rhythm. Tachycardia present.      Heart sounds: No murmur heard.  Pulmonary:      Effort: Pulmonary effort is normal. No respiratory distress.      Breath sounds: Normal breath sounds.   Abdominal:      Palpations: Abdomen is soft.      Tenderness: There is no abdominal tenderness. There is no guarding or rebound.   Musculoskeletal:         General: No swelling.      Cervical back: Neck supple.   Skin:     General: Skin is warm and dry.      Comments: Old, closed skin wounds on lower legs bilaterally. Do not appear infected.   Neurological:      Mental Status: She is alert. She is disoriented.      Comments: Follows simple commands, nods and shakes head to answer simple questions.  Still encephalopathic and not speaking.               Assessment/Plan   Overall assessment:  Patient is a 63-year-old female admitted to the  ICU for shock, suspect secondary to adrenal insufficiency but sepsis from diverticulitis also within the differential.     Neuro: Encephalopathy secondary to hypoperfusion, sepsis  - History: Lupus cerebritis  - Home meds: Continue home Keppra.  Holding home sertraline, risperidone until mentation improves  - [facility] ICU    Cardiovascular: Shock secondary to adrenal insufficiency and/or sepsis  - History: Atrial fibrillation no longer on anticoagulation, pacemaker, hypertension, hyperlipidemia  - Goals: MAP> 65  - Home meds: Resume home atorvastatin  - Last echo: LVEF 40 to 45% (5/2024)  -Norepinephrine drip discontinued 7/16    Pulmonary:   FiO2 (%):  [35 %] 35 %   - History: COPD  - Home meds: Continue DuoNeb and Pulmicort as needed for wheezing  Continuous pulse oximetry   O2 PRN to maintain SpO2 > 94%, wean as tolerated  - Imaging: Chest X-ray 7/18 shows pulmonary edema and minimal-small pleural effusions. Infectious process cannot be ruled out, but clinically less likely to be a pneumonia - already on Zosyn. No fevers or hemodynamic instability.    Gastrointestinal: Diverticulitis at the hepatic flexure   Results from last 7 days   Lab Units 07/18/24  0416 07/17/24  0912 07/16/24  0252 07/15/24  1114   INR   --  1.1  --  1.0   ALK PHOS U/L 104  --  177* 134   AST U/L 13  --  20 34   ALT U/L 17  --  25 27   LIPASE U/L  --   --   --  21       - Diet: NPO, Corpak could not be placed. More awake today, will have speech evaluate. Would like to avoid TPN if possible.  - GI on consult for diverticulitis. Continue supportive care. Outpatient colonoscopy in approximately six weeks if appropriate for goals of care. Poor candidate for PEG tube placement at this time.  - GI reconsulted for the GI bleed. Poor candidate for colonoscopy at this time given the inability to complete a bowel prep and active diverticulitis.  - Prophylaxis: Continue home Protonix, but increased to 40mg IV BID due to GI bleed  - Bowel regimen: None  -  Last BM: Last BM Date: 07/17/24    Renal:  Acute kidney injury on chronic kidney disease, suspect secondary to initial hypoperfusion/hypovolemia  Results from last 7 days   Lab Units 07/18/24  0416 07/17/24  1430 07/17/24  0250 07/16/24  0252 07/15/24  1301   SODIUM mmol/L 145 141 141 142  --    POTASSIUM mmol/L 4.0 4.2 4.5 4.5  --    MAGNESIUM mg/dL 1.98  --  2.04 1.87 1.56*   PHOSPHORUS mg/dL 4.7 5.0* 5.0* 4.3  --    BUN mg/dL 31* 31* 31* 26*  --    CREATININE mg/dL 2.48* 2.62* 2.58* 2.32*  --        Net IO Since Admission: 3,715.96 mL [07/18/24 0744]  - Daily CMP,Mg,Phos  - History: CKD, baseline creatinine 1.4  - [place]ano with strict I/O   - FeNa 0.4%, more consistent with pre-renal  - Nephrology on consult for persistent JED  - Electrolytes: Replete per protocol, goal K>4 Phos >3 Mg >2    Endocrine: Adrenal insufficiency  Results from last 7 days   Lab Units 07/18/24  0416 07/18/24  0047 07/17/24 2014 07/17/24  1537 07/17/24  1430 07/17/24  1200 07/17/24  0740 07/17/24  0419 07/15/24  1134 07/15/24  1114   POCT GLUCOSE mg/dL  --  137* 140* 151*  --  128* 151* 125*   < >  --    GLUCOSE mg/dL 127*  --   --   --  146*  --   --   --    < > 49*   TSH mIU/L  --   --   --   --   --   --   --   --   --  4.67*    < > = values in this interval not displayed.      -History: Adrenocortical insufficiency, diabetes  -Home meds: Holding Florinef and hydrocortisone, administering Solu-Cortef  -Endocrinology on consult, Solucortef 50mg q8h  -sliding scale: Insulin lispro  -BG < 180 goal    Rheumatology:  -History: Rheumatoid arthritis  -Holding mycophenolate due to risk of bone marrow suppression  -Reach out to patient's rheumatologist, Dr. Hoyos, in [place]. Has been approximately a year since last outpatient visit.  -CRP elevated at 4.19 but ESR normal at 11.    ENT:  -Bilateral epistaxis and nasal packing after Corpak attempt  -ENT on consult. Plan to remove left nasal packing in 24-48 hours (placed 7/17), right one to  remain in for 4-5 days  -Patient already on Zosyn    Hematology:  Results from last 7 days   Lab Units 24  0416 24  0014 24  1430 24  0904   HEMOGLOBIN g/dL 7.1* 6.6* 7.1* 7.4*   HEMATOCRIT % 23.1* 21.8* 23.1* 24.1*   PLATELETS AUTO x10*3/uL 60* 66* 70* 77*     - Daily CBC  -History: Chronic thrombocytopenia  - DVT Prophylaxis: Holding due to GI bleed, ok to resume from ENT perspective    Infectious Disease: Diverticulitis  Results from last 7 days   Lab Units 24  0416 24  0014 24  1430 24  0904 24  0250   WBC AUTO x10*3/uL 2.8* 2.7* 3.3* 3.3* 2.8*   SED RATE mm/h  --   --   --  11  --    CRP mg/dL  --   --   --   --  4.19*     Temp (24hrs), Av.3 °C (97.4 °F), Min:36 °C (96.8 °F), Max:36.6 °C (97.9 °F)     -Cultures: Blood and urine pending  -Hx E. Faecium UTI, urine culture negative on this admission  -Abx: Zosyn x7 days for diverticulitis.  -Stool pathogen and C diff negative    Musculoskeletal:   - PT to see   - OT to see       LDA:  CVC 07/15/24 Triple lumen Left Internal jugular (Active)   Placement Date/Time: 07/15/24 1500   Hand Hygiene Performed Prior to CVC Insertion: Yes  Site Prep: Chlorhexidine   Site Prep Agent has Completely Dried Before Insertion: Yes  All 5 Sterile Barriers Used (Gloves, Gown, Cap, Mask, Large Sterile Drape):...   Number of days: 0       Arterial Line 07/15/24 Right Radial (Active)   Placement Date/Time: 07/15/24 (c) 9   Size: 20 G  Orientation: Right  Location: Radial  Site Prep: Chlorhexidine   Local Anesthetic: Injectable  Insertion attempts: 1  Securement Method: Transparent dressing;Taped   Number of days: 0       Urethral Catheter (Active)   Placement Date/Time: 07/15/24 1300     Number of days: 0         CODE STATUS: DNR and No Intubation    Disposition: Patient to remain in the ICU.    RESTRAINTS: type: None    ABCDEF Checklist  Analgesia: Spontaneous awakening trial to be pursued if clinically appropriate. RASS  goal reviewed  Breathing: Spontaneous breathing trial to be pursued if clinically appropriate. Mechanical power of assisted ventilation reviewed  Choice of analgesia/sedation: Analgesic and sedative agents adjusted per clinical context.   Delirium assessed by CAM, will avoid exacerbating factors  Early mobility and exercise: Physical and occupational therapy engaged  Family: Plan of care, overall trajectory of patient shared with family. Questions elicited and answered as appropriate.     Due to the high probability of life threatening clinical decompensation, the patient required critical care time evaluating and managing this patient.  Critical care time included obtaining a history, examining the patient, ordering and reviewing studies, discussing, developing, and implementing a management plan, evaluating the patient's response to treatment, and discussion with other care team providers. I saw and evaluated the patient myself.  Critical care time was performed exclusive of billable procedures.      Case discussed with attending , Dr. Fausto Tyler DO

## 2024-07-18 NOTE — PROGRESS NOTES
"    Subjective   The patient is alert and cooperative but resting quietly in her bed.  Her nasal packing is still in place.  She has not had any further active bleeding.  She is scheduled for a swallow study in the near future with speech therapy.  According to nursing, she is more alert over the last 24 hours.       Objective     Physical Exam  Nose-Rhino Rocket's are still in place.  I was able to remove the left one and there was no evidence of any active bleeding.  She does have irritation along her septum and inferior turbinate where the packing was sitting and there is some bright red blood.  No obvious pus or infection.  Right side is stable with the Rhino Rocket in place.  Oral cavity-no evidence of any posterior clots.  Gag reflex intact.  Mouth is dry.      Last Recorded Vitals  Blood pressure 126/71, pulse 98, temperature 36.3 °C (97.3 °F), temperature source Temporal, resp. rate (!) 31, height 1.651 m (5' 5\"), weight 98 kg (216 lb 0.8 oz), SpO2 96%.  Intake/Output last 3 Shifts:  I/O last 3 completed shifts:  In: 2054.8 (21 mL/kg) [I.V.:22.8 (0.2 mL/kg); Blood:732.1; NG/GT:150; IV Piggyback:1150]  Out: 1008 (10.3 mL/kg) [Urine:1008 (0.3 mL/kg/hr)]  Weight: 98 kg     Relevant Results        Scheduled medications  [Held by provider] atorvastatin, 40 mg, oral, Nightly  budesonide, 0.5 mg, nebulization, BID  [Held by provider] docusate sodium, 100 mg, oral, BID  [Held by provider] folic acid, 1 mg, nasogastric tube, Daily  formoterol, 20 mcg, nebulization, BID  [Held by provider] heparin (porcine), 5,000 Units, subcutaneous, q8h  hydrocortisone sodium succinate, 50 mg, intravenous, q8h  insulin lispro, 0-5 Units, subcutaneous, q4h  ipratropium-albuteroL, 3 mL, nebulization, TID  levETIRAcetam, 500 mg, intravenous, q12h  [Held by provider] melatonin, 5 mg, oral, Nightly  [Held by provider] mycophenolate, 1,000 mg, oral, BID  pantoprazole, 40 mg, intravenous, BID  piperacillin-tazobactam, 3.375 g, intravenous, " q8h  [Held by provider] polyethylene glycol, 17 g, oral, Daily  [Held by provider] risperiDONE, 3 mg, nasogastric tube, BID  [Held by provider] sertraline, 25 mg, nasogastric tube, Nightly  [Held by provider] thiamine, 100 mg, nasogastric tube, Daily      Continuous medications  norepinephrine, 0-0.2 mcg/kg/min, Last Rate: Stopped (07/16/24 1515)      PRN medications  PRN medications: [Held by provider] acetaminophen, alteplase, dextrose, dextrose, glucagon, glucagon, ipratropium-albuteroL, magnesium sulfate, oxymetazoline, potassium chloride          This patient has a central line   Reason for the central line remaining today?     This patient has a urinary catheter   Reason for the urinary catheter remaining today?                 Assessment/Plan   Principal Problem:    Hypothermia, initial encounter    Epistaxis-stable.  I was able to remove her Rhino Rocket from the left nostril and she does have irritation along her left septum and left inferior turbinate but no active bleeding.  I recommend we use saline spray in her nose as well as Afrin for any further oozing.  At this point we will not advance anything into her left nostril because it is too friable.  We may be able to remove her Rhino Rocket from the right nostril in 24 hours if she does not have any further bleeding.  Will await formal swallow study results with the speech-language pathologist.       I spent 30 minutes in the professional and overall care of this patient.      Evaristo Rolle, DO

## 2024-07-18 NOTE — CONSULTS
INPATIENT NEPHROLOGY CONSULT NOTE        CONSULT: Nephrology SERVICE    PATIENT NAME: Bing Holliday    MRN: [id number]  DATE of SERVICE: July 18, 2024  TIME of SERVICE: 9:54 AM      REASON FOR CONSULT: JED  REQUESTING PHYSICIAN: Dr. Lambert   PRIMARY CARE PHYSICIAN: Claudio Hood DO    HPI:  Ms. Holliday is a 63 y.o. female who presented to [facility] July 15, 2024 for evaluation of weakness noted at skilled nursing facility.  Diagnosed with septic shock, acute diverticulitis, acute diverticular bleed and admitted to the intensive care unit.    Patient with complex past medical history including lupus nephritis class V in 2011, systemic lupus erythematosus with chronic kidney disease stage IIIb,  Cerebritis and arthropathy, uncontrolled type 2 diabetes mellitus, paroxysmal atrial fibrillation, heart failure with reduced ejection fraction, coronary artery disease status post CABG 2013, COPD, pulmonary hypertension, hypertension, hyperlipidemia, GERD.    Evaluated by gastroenterology for acute blood loss anemia, given blood transfusion with subsequent improvement.  Patient accidentally pulled her Corpak tube and sustained epistaxis evaluated by ENT and nose packing placed in both nostrils.    Vitals noted for improvement in blood pressure up to 124/92 patient with underlying secondary adrenal insufficiency, heart rate of 93 oxygen requirement stable intermittently requiring Venturi mask.  Urine output up to 500 cc today.    Labs demonstrated acute kidney injury with peak serum creatinine to 2.6 mg deciliter from baseline creatinine of 1.5.  Mild hyponatremia as patient does not have access to free water intake yet sodium level of 145, hyperchloremia chloride 113, potassium within range bicarb with mild metabolic acidosis bicarb 21, hypoalbuminemia albumin 2.5.  Hemoglobin was 6.6 mg/dL received 1 unit of packed RBCs repeat hemoglobin 7.1.   Pancytopenia with WBC of 2.8, platelets of 60    Seen and evaluated at bedside in the intensive care unit today more awake and alert.  Patient has been off pressor support for the past 48 hours.  Montano catheter in place urine output encouraging.  Pending speech swallow evaluation to determine if patient can proceed with oral intake.    ASSESSMENT AND PLAN:  Acute kidney injury superimposed on chronic kidney disease stage IIIb:  -- Acute kidney injury likely ischemic related ATN in the setting of septic shock and acute GI bleed, peak serum creatinine up to 2.6 mg deciliter with drop in GFR to 20 mill per minute per 1.73 m²  --From baseline serum creatinine of 1.5 and EGFR of 40 mill per minute per 1.73 m²  --Immunocompromised host with pancytopenia  --Acute diverticulitis with diverticular bleed, receiving blood transfusion  --Montano catheter in place no signs of obstruction  --On background CKD 3B, lupus nephritis class V    Septic shock secondary to acute diverticulitis:   --Was hypothermic on presentation  -- Shock resolved, off pressor support    Acute hypoxic respiratory failure requiring intermittent Venturi mask    Volume: Tendency to third space due to hypoalbuminemia, CKD, diabetes patient with underlying abdominal situs.  -- Chest x-ray with findings concerning for pulmonary edema  -- Intermittent volume expansion with oncotic solution to maintain hemodynamic stability.  -- Most likely will tolerate diuretics today as patient still n.p.o.    Acute blood loss anemia, GI team following patient at high risk to have procedure.    Immunocompromised    Systemic lupus erythematosus managed with mycophenolate and prednisone.    Adrenal insufficiency: On hydrocortisone 50 mg 3 times daily.    Pancytopenia    Pulmonary hypertension    Paroxysmal atrial fibrillation, rate controlled        Plan:  Acute kidney injury superimposed on chronic kidney disease stage IIIb, acute kidney injury multifactorial component of  ischemic related ATN, sepsis related ATN.  Currently with mild volume overload/venous congestion  Post resuscitation since patient n.p.o. will hold on adding diuretics.     Urine output encouraging today, Auto diuresis noted.   Patient remains n.p.o. pending swallow evaluation.   Once able to pass swallow eval to start protein supplements 3 times daily.   Strict input and output to guide volume management.     No acute indication for renal replacement therapy today.  Dialysis options discussed with the patient was not interested in proceeding dialysis when the need arise.    Patient reports that her mother passed During hemodialysis session and she does not wish to be on hemodialysis.    From PICC line standpoint since the patient is not interested in future hemodialysis okay to place PICC line.    Will follow, thank you!    I sincerely, thank you Dr. Lambert for this opportunity to participate in the care of your patient, I will follow from Nephrology point view!    PAST MEDICAL HISTORY:    Past Medical History:   Diagnosis Date    Abnormal kidney function 04/04/2023    Acute headache 03/10/2024    Acute iritis of right eye 07/24/2015    Acute low back pain 10/30/2023    Acute upper respiratory infection, unspecified 03/04/2020    Acute URI    Acute upper respiratory infection, unspecified 09/30/2015    URTI (acute upper respiratory infection)    Arthralgia of right knee 02/14/2024    Arthritis     Atypical facial pain 03/10/2024    Body mass index (BMI) 23.0-23.9, adult 10/15/2021    BMI 23.0-23.9, adult    Body mass index (BMI) 33.0-33.9, adult 03/04/2020    BMI 33.0-33.9,adult    Cardiomegaly 08/27/2013    Left ventricular hypertrophy    Chest pain 06/10/2022    Comment on above: Added by Problem List Migration; 2013-3-12; Moved to Suppressed Nov 25 2013 9:16PM; Comment on above: Added by Problem List Migration; 2013-3-12; Moved to Suppressed Nov 25 2013 9:16PM;    Chronic kidney disease, stage 3 unspecified  (Multi) 07/02/2013    Chronic kidney disease, stage III (moderate)    Confusional state 03/10/2024    Contact with and (suspected) exposure to covid-19 04/04/2023    Delirium 03/10/2024    Disease of pericardium, unspecified ([facility]) 07/02/2013    Pericardial disease    Encounter for follow-up examination after completed treatment for conditions other than malignant neoplasm 10/06/2022    Hospital discharge follow-up    Generalized contraction of visual field, right eye 01/29/2015    Generalized contraction of visual field of right eye    Hearing loss 03/10/2024    History of cataract 03/10/2024    History of thrombocytopenia 03/10/2024    Comment on above: Added by Problem List Migration; 2013-7-2;    Homonymous bilateral field defects, right side 04/29/2016    Homonymous bilateral field defects of right side    Hypertensive chronic kidney disease with stage 1 through stage 4 chronic kidney disease, or unspecified chronic kidney disease 07/02/2013    Nephrosclerosis    Laceration without foreign body, left foot, initial encounter 07/03/2018    Foot laceration, left, initial encounter    Localized edema 03/10/2024    Localized, primary osteoarthritis 02/14/2024    Mass of skin 03/10/2024    Migraine with aura, not intractable, without status migrainosus 10/24/2022    Ocular migraine    Open wound 03/10/2024    Open wound of left foot 03/10/2024    Other conditions influencing health status 07/02/2013    Chronic Glomerulonephritis In Diseases Classified Elsewhere    Other conditions influencing health status 07/02/2013    Progressive Familial Myoclonic Epilepsy    Other conditions influencing health status 07/02/2013    Protein S Deficiency    Other conditions influencing health status 05/22/2015    Familial Combined Hyperlipidemia    Other conditions influencing health status 10/24/2022    IDDM (insulin dependent diabetes mellitus)    Other conditions influencing health status 03/14/2022    Diabetes mellitus,  insulin dependent (IDDM), uncontrolled    Other long term (current) drug therapy 10/24/2022    Long-term use of Plaquenil    Overweight with body mass index (BMI) 25.0-29.9 03/10/2024    Pain of toe 03/10/2024    Personal history of COVID-19 04/04/2023    Personal history of diseases of the blood and blood-forming organs and certain disorders involving the immune mechanism 07/02/2013    History of thrombocytopenia    Personal history of diseases of the skin and subcutaneous tissue 08/11/2015    History of foot ulcer    Personal history of nephrotic syndrome 07/02/2013    History of nephrotic syndrome    Personal history of other diseases of the circulatory system 08/27/2013    History of sinus tachycardia    Personal history of other diseases of the nervous system and sense organs     History of cataract    Personal history of other diseases of the respiratory system     History of bronchitis    Personal history of other infectious and parasitic diseases 07/02/2013    History of hepatitis    Personal history of other specified conditions     History of shortness of breath    Personal history of other specified conditions 08/27/2013    History of edema    Posterior epistaxis 03/10/2024    Puckering of macula, right eye 10/24/2022    ERM OD (epiretinal membrane, right eye)    Raynaud's syndrome without gangrene 07/02/2013    Raynaud's disease    Sinusitis 03/10/2024    Systemic lupus erythematosus, unspecified (Multi) 07/24/2015    SLE (systemic lupus erythematosus)    Systemic lupus erythematosus, unspecified (Multi) 07/24/2015    SLE (systemic lupus erythematosus)    Systemic lupus erythematosus, unspecified (Multi) 07/24/2015    Systemic lupus    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) 07/02/2013    Type 2 diabetes with nephropathy    Type 2 diabetes mellitus with mild nonproliferative diabetic retinopathy without macular edema, left eye (Multi) 07/27/2015    Non-proliferative diabetic  retinopathy, left eye    Type 2 diabetes mellitus with mild nonproliferative diabetic retinopathy without macular edema, unspecified eye (Multi) 07/24/2015    Mild non proliferative diabetic retinopathy    Type 2 diabetes mellitus with proliferative diabetic retinopathy without macular edema, right eye (Multi) 07/27/2015    Proliferative diabetic retinopathy of right eye    Type 2 diabetes mellitus with proliferative diabetic retinopathy without macular edema, unspecified eye (Multi) 07/24/2015    Diabetic proliferative retinopathy    Unspecified acute and subacute iridocyclitis 07/24/2015    Acute iritis, right eye    Unspecified open wound, left foot, sequela 07/03/2018    Wound, open, foot, left, sequela       PAST SURGICAL HISTORY:    Past Surgical History:   Procedure Laterality Date    ANKLE SURGERY  01/29/2015    Ankle Surgery    CARDIAC ELECTROPHYSIOLOGY PROCEDURE Left 5/17/2024    Procedure: PPM IMPLANT DUAL;  Surgeon: Antnoio Rodriges MD;  Location: [facility];  Service: Electrophysiology;  Laterality: Left;  Pt. needs platelets and Prednisone prior to procedure    CHOLECYSTECTOMY  01/29/2015    Cholecystectomy    CT GUIDED PERCUTANEOUS BIOPSY BONE DEEP  5/4/2021    CT GUIDED PERCUTANEOUS BIOPSY BONE DEEP 5/4/2021 [facility] CLINICAL LEGACY    EYE SURGERY  03/06/2015    Eye Surgery    FOOT SURGERY  01/29/2015    Foot Surgery    MR HEAD ANGIO WO IV CONTRAST  7/26/2013    MR HEAD ANGIO WO IV CONTRAST 7/26/2013 [facility] CLINICAL LEGACY    MR HEAD ANGIO WO IV CONTRAST  9/17/2021    MR HEAD ANGIO WO IV CONTRAST 9/17/2021 [facility]    MR HEAD ANGIO WO IV CONTRAST  3/25/2023    MR HEAD ANGIO WO IV CONTRAST [facility]    MR NECK ANGIO WO IV CONTRAST  7/26/2013    MR NECK ANGIO WO IV CONTRAST 7/26/2013 [facility] CLINICAL LEGACY    MR NECK ANGIO WO IV CONTRAST  9/17/2021    MR NECK ANGIO WO IV CONTRAST 9/17/2021 [facility]    MR NECK ANGIO WO IV CONTRAST  3/25/2023    MR NECK ANGIO WO IV CONTRAST [facility]     OTHER SURGICAL HISTORY  01/29/2015    Creation Of Pericardial Window    OTHER SURGICAL HISTORY  01/29/2015    Quadricepsplasty    TOTAL HIP ARTHROPLASTY  01/29/2015    Hip Replacement       FAMILY HISTORY:    Family History   Problem Relation Name Age of Onset    Other (RENAL DISEASE) Mother          END STAGE    Other (CARDIAC DISORDER) Mother      Cataracts Mother      Stroke Mother      Diabetes Mother      Kidney disease Mother      Lupus Mother      Other (CARDIAC DISORDER) Father      COPD Father      Glaucoma Father      Hypertension Father      Sleep apnea Father      Other (CARDIAC DISORDER) Sister      Depression Sister      Kidney disease Sister      Sickle cell trait Sister      Sleep apnea Daughter         SOCIAL HISTORY:    Social History     Tobacco Use    Smoking status: Never     Passive exposure: Never    Smokeless tobacco: Never   Vaping Use    Vaping status: Never Used   Substance Use Topics    Alcohol use: Not Currently     Comment: RARE    Drug use: Never       MEDICATIONS:  Prior to Admission Medications:  Medications Prior to Admission   Medication Sig Dispense Refill Last Dose    acetaminophen (Tylenol) 325 mg tablet Take 2 tablets (650 mg) by mouth every 4 hours if needed for mild pain (1 - 3), moderate pain (4 - 6) or fever (temp greater than 38.0 C).   Past Week    albuterol-budesonide (Airsupra) 90-80 mcg/actuation inhaler Inhale 2 puffs every 6 hours if needed (sob/wheezing).   Past Week    atorvastatin (Lipitor) 40 mg tablet Take 1 tablet (40 mg) by mouth once daily. (Patient taking differently: Take 1 tablet (40 mg) by mouth once daily at bedtime.) 90 tablet 3 7/14/2024 at PM    balsam peru-castor oiL (Venelex) ointment Apply 1 Application topically 2 times a day. To buttocks, sacrum, and thighs   7/14/2024 at PM    blood-glucose sensor (Dexcom G6 Sensor) device Use to check sugars 3 times daily 4 each 2 7/14/2024    Dexcom G4 platinum  (Dexcom G6 ) misc Use as  instructed 1 each 0 7/14/2024    Dexcom G4 platinum transmitter (Dexcom G6 Transmitter) device Use as instructed 1 each 0 7/14/2024    fludrocortisone (Florinef) 0.1 mg tablet Take 1 tablet (0.1 mg) by mouth every other day.   7/14/2024 at AM    fluticasone-umeclidin-vilanter (TRELEGY-ELLIPTA) 200-62.5-25 mcg blister with device Inhale 1 puff once daily. 60 each 5 7/14/2024 at AM    folic acid (Folvite) 1 mg tablet TAKE 1 TABLET BY MOUTH EVERY DAY 90 tablet 1 7/14/2024 at AM    furosemide (Lasix) 40 mg tablet Take 1 tablet (40 mg) by mouth once daily.   7/14/2024 at AM    glucagon HCL (Glucagon, HCl, Emergency Kit) 1 mg recon soln Inject 1 mg into the muscle if needed.   Past Month    guaiFENesin (Mucinex) 600 mg 12 hr tablet Take 2 tablets (1,200 mg) by mouth 2 times a day. Do not crush, chew, or split.   7/14/2024 at PM    hydrocortisone (Cortef) 10 mg tablet Take 1 tablet (10 mg) by mouth once daily. In the afternoon   7/14/2024 at PM    hydrocortisone (Cortef) 20 mg tablet Take 1 tablet (20 mg) by mouth once daily in the morning.   7/14/2024 at AM    insulin glargine (Lantus U-100 Insulin) 100 unit/mL injection Inject 10 Units under the skin once daily in the morning. Take as directed per insulin instructions.   7/14/2024 at AM    insulin lispro (HumaLOG) 100 unit/mL injection Inject 0-0.1 mL (0-10 Units) under the skin 3 times a day as needed for high blood sugar (before meals). Take as directed per insulin Sliding Scale instructions.  0-150 = 0 units  151-200 = 2 units  201-250 = 4 units  251-300 = 6 units  301-350 = 8 units  351-400 = 10 units  >400 = give 10 units and contact MD   7/14/2024 at PM    levETIRAcetam (Keppra) 500 mg tablet Take 1 tablet (500 mg) by mouth every 12 hours. 60 tablet 0 7/14/2024 at PM    meclizine (Antivert) 25 mg tablet Take 1 tablet (25 mg) by mouth every 12 hours if needed for dizziness.   Past Week    melatonin 5 mg tablet Take 1 tablet (5 mg) by mouth once daily at bedtime.    "7/14/2024 at PM    metoprolol tartrate (Lopressor) 25 mg tablet Take 1 tablet (25 mg) by mouth 2 times a day.   7/14/2024 at PM    multivitamin with minerals tablet Take 1 tablet by mouth once daily.   7/14/2024 at AM    mycophenolate (Cellcept) 500 mg tablet TAKE 2 TABLETS BY MOUTH TWICE A  tablet 0 7/14/2024 at PM    paliperidone (Invega) 6 mg 24 hr tablet Take 1 tablet (6 mg) by mouth once daily. Do not crush, chew, or split. (Patient taking differently: Take 1 tablet (6 mg) by mouth once daily. Total of 15mg)   7/14/2024 at AM    paliperidone (Invega) 9 mg 24 hr tablet Take 1 tablet (9 mg) by mouth once daily in the morning. Total of 15mg   7/14/2024 at AM    pantoprazole (ProtoNix) 40 mg EC tablet TAKE 1 TABLET BY MOUTH EVERY DAY 90 tablet 1 7/14/2024 at AM    pen needle, diabetic 31 gauge x 5/16\" needle Use to inject 1-4 times daily as directed. 100 each 11 7/14/2024    potassium chloride CR 20 mEq ER tablet Take 1 tablet (20 mEq) by mouth once daily at bedtime.   7/14/2024 at PM    sertraline (Zoloft) 25 mg tablet Take 1 tablet (25 mg) by mouth once daily at bedtime.   7/14/2024 at PM    thiamine 100 mg tablet Take 1 tablet (100 mg) by mouth once daily.   7/14/2024 at AM       INPATIENT MEDICATIONS:    Current Facility-Administered Medications:     [Held by provider] acetaminophen (Tylenol) tablet 650 mg, 650 mg, nasogastric tube, q4h PRN, Jaziel Tyler DO    alteplase (Cathflo Activase) injection 2 mg, 2 mg, intra-catheter, PRN, Krista Lambert MD    [Held by provider] atorvastatin (Lipitor) tablet 40 mg, 40 mg, oral, Nightly, Jaziel Tyler DO    budesonide (Pulmicort) 0.5 mg/2 mL nebulizer solution 0.5 mg, 0.5 mg, nebulization, BID, Krista Lambert MD, 0.5 mg at 07/18/24 0904    dextrose 50 % injection 12.5 g, 12.5 g, intravenous, q15 min PRN, Jaziel Tyler DO    dextrose 50 % injection 25 g, 25 g, intravenous, q15 min PRN, Jaziel Tyler DO    [Held by provider] docusate sodium (Colace) capsule 100 mg, 100 " mg, oral, BID, Jaziel Tyler DO    [Held by provider] folic acid (Folvite) tablet 1 mg, 1 mg, nasogastric tube, Daily, Jaziel Tyler DO    formoterol (Perforomist) 20 mcg/2 mL nebulizer solution 20 mcg, 20 mcg, nebulization, BID, Krista Lambert MD, 20 mcg at 07/18/24 0904    glucagon (Glucagen) injection 1 mg, 1 mg, intramuscular, q15 min PRN, Jaziel Tyler DO    glucagon (Glucagen) injection 1 mg, 1 mg, intramuscular, q15 min PRN, Jaziel Tyler DO    [Held by provider] heparin (porcine) injection 5,000 Units, 5,000 Units, subcutaneous, q8h, Jaziel Tyler DO, 5,000 Units at 07/16/24 2349    hydrocortisone sod succ (PF) (Solu-CORTEF) injection 50 mg, 50 mg, intravenous, q8h, Avi Sloan, DO, 50 mg at 07/18/24 0136    insulin lispro (HumaLOG) injection 0-5 Units, 0-5 Units, subcutaneous, q4h, Jaziel Tyler DO, 1 Units at 07/17/24 0824    ipratropium-albuteroL (Duo-Neb) 0.5-2.5 mg/3 mL nebulizer solution 3 mL, 3 mL, nebulization, q4h PRN, Jaziel Tyler DO    ipratropium-albuteroL (Duo-Neb) 0.5-2.5 mg/3 mL nebulizer solution 3 mL, 3 mL, nebulization, TID, Krista Lambert MD, 3 mL at 07/18/24 0904    levETIRAcetam (Keppra) 500 mg in sodium chloride (iso)  mL, 500 mg, intravenous, q12h, Jaziel Tyler DO, Stopped at 07/18/24 0415    magnesium sulfate 2 g in sterile water for injection 50 mL, 2 g, intravenous, q6h PRN, Donald Busby MD, Stopped at 07/18/24 0800    [Held by provider] melatonin tablet 5 mg, 5 mg, oral, Nightly, Jaziel Tyler DO    [Held by provider] mycophenolate (Cellcept) capsule 1,000 mg, 1,000 mg, oral, BID, Jaziel Tyler DO    norepinephrine (Levophed) 8 mg in dextrose 5% 250 mL (0.032 mg/mL) infusion (premix), 0-0.2 mcg/kg/min, intravenous, Continuous, Toni Mccullough DO, Stopped at 07/16/24 1510    oxymetazoline (Afrin) 0.05 % nasal spray 2 spray, 2 spray, Each Nostril, q12h PRN, Donald Busby MD, 2 spray at 07/17/24 4184    pantoprazole (ProtoNix) injection 40 mg, 40 mg, intravenous, BID, Jaziel  DO Nayeli, 40 mg at 07/18/24 0922    piperacillin-tazobactam (Zosyn) 3.375 g in dextrose (iso) IV 50 mL, 3.375 g, intravenous, q8h, Jaziel Tyler DO, Stopped at 07/18/24 0705    [Held by provider] polyethylene glycol (Glycolax, Miralax) packet 17 g, 17 g, oral, Daily, Jaziel Tyler DO    potassium chloride 40 mEq in sterile water for injection 100 mL, 40 mEq, intravenous, q6h PRN, Donald Busby MD    [Held by provider] risperiDONE (RisperDAL) tablet 3 mg, 3 mg, nasogastric tube, BID, Jaziel Tyler DO    [Held by provider] sertraline (Zoloft) tablet 25 mg, 25 mg, nasogastric tube, Nightly, Jaziel Tyler DO    [Held by provider] thiamine (Vitamin B-1) tablet 100 mg, 100 mg, nasogastric tube, Daily, Jaziel Tyler DO    ALLERGIES:  Allergies   Allergen Reactions    Ace Inhibitors Shortness of breath, Swelling, Other and Angioedema     Facial droop    Hydroxychloroquine Other and Nausea/vomiting     Retinal Bleeding    Lisinopril Swelling    Sulfa (Sulfonamide Antibiotics) Hives       COMPLETE REVIEW OF SYSTEMS:    A comprehensive 14 point review of systems was obtained.  Constitutional: Positive for weakness, slow to response  HENT: Bilateral nasal packing in place for the management of epistaxis  Eyes: Negative for pain and visual disturbance.  Respiratory: Dyspnea requiring intermittent Venturi mask  Cardiovascular: Negative for chest pain and palpitations.  Gastrointestinal: Negative for abdominal pain, diarrhea and vomiting.  Genitourinary: Montano catheter in place  Musculoskeletal: Negative for back pain and myalgias.  Skin: negative for wound. Negative for color change and rash.  Neurological: Negative for weakness and headaches.  Hematological: Negative for adenopathy. Does not bruise/bleed easily.  Psychiatric/Behavioral: Negative for agitation and confusion.  All other reviewed and negative other than HPI.    PHYSICAL EXAM: Physical exam performed.  Patient Vitals for the past 24 hrs:   BP Temp Temp src Pulse Resp  SpO2 Weight   07/18/24 0906 -- -- -- -- -- 96 % --   07/18/24 0700 -- -- -- 98 (!) 31 97 % --   07/18/24 0630 -- -- -- 86 12 96 % --   07/18/24 0615 -- -- -- 88 12 96 % --   07/18/24 0600 -- 36.3 °C (97.3 °F) Temporal 98 13 97 % 98 kg (216 lb 0.8 oz)   07/18/24 0545 -- -- -- 86 12 96 % --   07/18/24 0530 -- 36.6 °C (97.9 °F) Temporal 80 13 96 % --   07/18/24 0515 -- 36.6 °C (97.9 °F) Temporal 84 13 96 % --   07/18/24 0500 -- 36.5 °C (97.7 °F) Temporal 90 11 96 % --   07/18/24 0445 -- 36.6 °C (97.9 °F) Temporal -- -- -- --   07/18/24 0430 -- 36.6 °C (97.9 °F) Temporal -- -- -- --   07/18/24 0415 -- 36.6 °C (97.9 °F) Temporal 78 12 95 % --   07/18/24 0400 -- 36.5 °C (97.7 °F) Temporal 88 12 96 % --   07/18/24 0345 -- 36.5 °C (97.7 °F) Temporal 74 11 97 % --   07/18/24 0330 -- 36.5 °C (97.7 °F) Temporal 62 11 97 % --   07/18/24 0315 -- 36.5 °C (97.7 °F) Temporal 70 11 97 % --   07/18/24 0300 -- -- -- 82 15 97 % --   07/18/24 0255 -- 36 °C (96.8 °F) Temporal 78 11 97 % --   07/18/24 0254 -- -- -- 78 10 97 % --   07/18/24 0252 -- -- -- 76 10 96 % --   07/18/24 0250 -- 36 °C (96.8 °F) Temporal 82 14 96 % --   07/18/24 0245 -- -- -- 84 11 98 % --   07/18/24 0238 126/71 36 °C (96.8 °F) Temporal 78 12 98 % --   07/18/24 0230 -- -- -- 76 (!) 9 98 % --   07/18/24 0215 -- -- -- 80 13 98 % --   07/18/24 0200 -- -- -- 92 13 98 % --   07/18/24 0145 -- -- -- 80 10 97 % --   07/18/24 0130 -- -- -- 72 10 97 % --   07/18/24 0115 -- -- -- 82 16 97 % --   07/18/24 0100 -- -- -- 80 12 98 % --   07/18/24 0045 -- -- -- 84 12 99 % --   07/18/24 0030 -- -- -- 62 (!) 9 99 % --   07/18/24 0015 -- -- -- 78 13 93 % --   07/18/24 0000 -- 36 °C (96.8 °F) Temporal 74 15 98 % --   07/17/24 2330 -- -- -- 84 11 98 % --   07/17/24 2300 -- -- -- 74 12 97 % --   07/17/24 2230 -- -- -- 74 (!) 9 97 % --   07/17/24 2200 -- -- -- 82 12 97 % --   07/17/24 2130 -- -- -- 86 12 99 % --   07/17/24 2100 -- -- -- 80 12 99 % --   07/17/24 2030 -- -- -- 74 14 100 %  --   07/17/24 2000 -- 36 °C (96.8 °F) Temporal 72 12 96 % --   07/17/24 1930 -- -- -- 92 17 95 % --   07/17/24 1900 -- -- -- 86 15 96 % --   07/17/24 1830 -- -- -- 88 16 97 % --   07/17/24 1800 -- -- -- 72 12 95 % --   07/17/24 1730 -- -- -- 92 17 99 % --   07/17/24 1700 -- -- -- 84 16 98 % --   07/17/24 1630 -- -- -- 72 10 96 % --   07/17/24 1600 -- 36.2 °C (97.2 °F) -- 76 13 96 % --   07/17/24 1530 -- -- -- 70 10 95 % --   07/17/24 1500 -- -- -- 76 11 95 % --   07/17/24 1400 -- -- -- 94 25 96 % --   07/17/24 1319 -- -- -- -- -- 94 % --   07/17/24 1300 -- -- -- 68 12 95 % --   07/17/24 1200 -- 36.2 °C (97.2 °F) Temporal 78 21 92 % --   07/17/24 1100 -- -- -- 72 12 96 % --   07/17/24 1000 -- -- -- 86 13 95 % --     Body mass index is 35.95 kg/m².    CONSTITUTIONAL:  Vital signs reviewed.  Intermittent hypotension however off pressor support  GENERAL: Frail, cachectic  SKIN: Nasal packing in place  HEAD/SINUSES: Atraumatic  EYES: PERRLA, + pallor  OROPHARYNX: Dry mucous membranes  NECK: +  jugulovenous distention, No carotid bruits, Carotid pulse normal contour, Supple  LUNGS: Diminished air entry bilaterally, coarse rhonchi fine Rales  CARDIAC: distant S1 and S2; no rubs, murmurs, or gallops  ABDOMEN: Abdomen soft, non-tender, BS normal, distended  EXTREMITIES: Good capillary refill, trace chronic dependent edema  NEUROLOGIC: Awake, alert and oriented ×2-3, No focal deficit  HEMATOLOGIC/Lymphatic/Immunologic: No abnormal bleeding, echymosis, inflammation. No cervical or supraclavicular lymphadenopathy.  : Montano catheter in place    Intake/Output last 3 shifts:  I/O last 3 completed shifts:  In: 2054.8 (21 mL/kg) [I.V.:22.8 (0.2 mL/kg); Blood:732.1; NG/GT:150; IV Piggyback:1150]  Out: 1008 (10.3 mL/kg) [Urine:1008 (0.3 mL/kg/hr)]  Weight: 98 kg     Diagnostic tests reviewed for today's visit:    CBC, Coags, RNP, Mg, Phos   Results from last 7 days   Lab Units 07/18/24  0416   WBC AUTO x10*3/uL 2.8*   RBC AUTO  x10*6/uL 2.41*   HEMOGLOBIN g/dL 7.1*   HEMATOCRIT % 23.1*     Results from last 7 days   Lab Units 07/18/24  0416   SODIUM mmol/L 145   POTASSIUM mmol/L 4.0   CHLORIDE mmol/L 113*   CO2 mmol/L 21   BUN mg/dL 31*   CREATININE mg/dL 2.48*   CALCIUM mg/dL 7.5*   PHOSPHORUS mg/dL 4.7   MAGNESIUM mg/dL 1.98   BILIRUBIN TOTAL mg/dL 0.5   ALT U/L 17   AST U/L 13     Results from last 7 days   Lab Units 07/15/24  1245   COLOR U  Yellow   APPEARANCE U  Clear   PH U  5.0   SPEC GRAV UR  1.014   PROTEIN U mg/dL 30 (1+)*   BLOOD UR  NEGATIVE   NITRITE U  NEGATIVE   WBC UR /HPF 11-20*     Chest x-ray:  FINDINGS:  The lungs are adequately inflated. No acute consolidation, however,  there is suggestion of patchy bilateral ground-glass opacity as well  mild prominence of the interstitium which is nonspecific but can be  seen with mild pulmonary edema as well as inflammatory or potentially  atypical infectious etiologies. Possible minimal-small pleural  effusions, limited in evaluation on this single portable view. No  pneumothorax.  The cardiomediastinal silhouette is mildly enlarged.      IMPRESSION:  Mild patchy bilateral ground-glass opacities as well as mild  prominence of the interstitium. Possible minimal-small pleural  effusions. Mild cardiomegaly. Findings are concerning for CHF and  mild pulmonary edema however inflammatory or atypical infectious  etiologies could have this appearance.  IMAGING: CXR reviewed in [facility] images.    KUB   FINDINGS:  NG tube noted with tip overlying the gastric antrum region.  Nonobstructive bowel gas pattern. Limited evaluation of  pneumoperitoneum on supine imaging, however no gross evidence of free  air is noted.      Visualized lungs are clear.      Osseous structures demonstrate no acute bony changes.      Severe left hip joint degenerative changes with joint space loss.  Severe degenerative changes of the lower lumbar spine.      IMPRESSION:  1. Nonobstructive bowel gas pattern  2. NG tube  "tip overlying the gastric antrum region.    SIGNATURE: Vikram Perez MD  Nephrology and Hypertension  [address] [place]  Office phone: [phone]  FAX: [phone]      This note was partially created using voice recognition software and is inherently subject to errors including those of syntax and \"sound-alike\" substitutions which may escape proofreading.  In such instances, original meaning may be extrapolated by contextual derivation.    "

## 2024-07-18 NOTE — PROGRESS NOTES
Bing Holliday is a 63 y.o.  on day 3 of admission presenting with Hypothermia, initial encounter.     Subjective   The patient is sitting up in bed and has had persistent epistaxis since I removed the small Rhino Rocket from her left nostril.  She also has had some bleeding out of the right side of her nose as well, despite the small Rhino Rocket in place.  ENT was called to manage this and possibly repacked the patient.       Objective     Physical Exam  Nose-after removing her nasal sponge from the left nostril and the Rhino Rocket from her right nostril.  I was able to cauterize several areas along her right and left anterior septum.  She continued to have some slow oozing from her left posterior region so I was able to place an 8 cm nasal Merocel sponge and insufflate this with Afrin solution.  This seemed to control her bleeding.  Oral cavity-blood was suctioned from the oropharynx using a Yankauer.    Procedure: Complex control of epistaxis  Pre OP Diagnosis:  Anterior and posterior epistaxis left nostril  Post OP Diagnosis: Same  Procedure: Control of Epistaxis complex-using Silver Nitrate Cauterization along the right and left anterior septum and placement of an 8 cm nasal Merocel sponge to control posterior bleeding on the left  Surgeon: Evaristo Rolle,   Assist:  None   Anesthesia: Topical Lidocaine 4% mixed with 0.05% Afrin  EBL: minimal  Complications: None  Disposition:  The patient tolerated the procedure well.  There were no complications.    Findings: The patient had diffuse oozing along her right and left anterior septum as well as diffuse oozing posteriorly on that left side.  A definitive source could not be identified on the left.    Procedure:  After informed consent was obtained, the patient was sat up in her hospital bed..  Under direct visualization, I used Silver nitrate to cauterize several vessels along the right and left anterior septum.  After doing so, there was no further evidence  "of bleeding.  She still had some minor bleeding along her left posterior region so I placed an 8 cm nasal Merocel sponge after coating it with bacitracin ointment.  The sponge was insufflated with Afrin solution.  The patient tolerated the procedure well and there were no complications.      Last Recorded Vitals  Blood pressure 135/77, pulse 96, temperature 36 °C (96.8 °F), resp. rate 15, height 1.651 m (5' 5\"), weight 98 kg (216 lb 0.8 oz), SpO2 92%.  Intake/Output last 3 Shifts:  I/O last 3 completed shifts:  In: 2054.8 (21 mL/kg) [I.V.:22.8 (0.2 mL/kg); Blood:732.1; NG/GT:150; IV Piggyback:1150]  Out: 1008 (10.3 mL/kg) [Urine:1008 (0.3 mL/kg/hr)]  Weight: 98 kg     Relevant Results        Scheduled medications  [Held by provider] atorvastatin, 40 mg, oral, Nightly  budesonide, 0.5 mg, nebulization, BID  [Held by provider] docusate sodium, 100 mg, oral, BID  [Held by provider] folic acid, 1 mg, nasogastric tube, Daily  formoterol, 20 mcg, nebulization, BID  [Held by provider] heparin (porcine), 5,000 Units, subcutaneous, q8h  hydrocortisone sodium succinate, 50 mg, intravenous, q8h  insulin lispro, 0-5 Units, subcutaneous, q4h  ipratropium-albuteroL, 3 mL, nebulization, TID  levETIRAcetam, 500 mg, intravenous, q12h  [Held by provider] melatonin, 5 mg, oral, Nightly  [Held by provider] mycophenolate, 1,000 mg, oral, BID  pantoprazole, 40 mg, intravenous, BID  piperacillin-tazobactam, 3.375 g, intravenous, q8h  [Held by provider] polyethylene glycol, 17 g, oral, Daily  [Held by provider] risperiDONE, 3 mg, nasogastric tube, BID  [Held by provider] sertraline, 25 mg, nasogastric tube, Nightly  sodium chloride, 2 spray, Each Nostril, TID  [Held by provider] thiamine, 100 mg, nasogastric tube, Daily      Continuous medications  norepinephrine, 0-0.2 mcg/kg/min, Last Rate: Stopped (07/16/24 2615)      PRN medications  PRN medications: [Held by provider] acetaminophen, alteplase, dextrose, dextrose, glucagon, glucagon, " ipratropium-albuteroL, magnesium sulfate, oxymetazoline, potassium chloride                       Assessment/Plan   Principal Problem:    Hypothermia, initial encounter    Persistent left posterior epistaxis-controlled with 8 cm nasal Merocel sponge  Right and left anterior epistaxis-controlled with silver nitrate    Recommendation:  Okay to use Afrin as needed for minor oozing  Okay to start using saline spray in the right nostril starting tomorrow to keep everything moist.  We will leave the nasal Merocel sponge in the left nostril over the next 4 to 5 days to prevent any further bleeding.       I will continue to follow along.      Evaristo Rolle, DO

## 2024-07-18 NOTE — PROGRESS NOTES
Bing Holliday is a 63 y.o. female on day 3 of admission presenting with Hypothermia, initial encounter.    Subjective   No acute events overnight.     Daytime bedside nurse tells me she had 3 watery brown bowel movements last night without bright or dark red blood. No clots. No further episodes of epistaxis.  She is hemodynamically stable now saturating on room air.  She continues to be nonverbal with me, but opens eyes to verbal stimulation.  Shakes her head yes and no to questioning. She is not currently in pain.  She follows basic commands and moves all of her upper and lower extremities bilaterally.    Objective     Physical Exam  VITALS: I have reviewed the vital signs.   GENERAL: Chronically ill, sickly adult in no acute distress.  Resting comfortably in bed.  On room air.  NEURO: ANO x 0 this morning. Awakens to name.  Poor tracking with eyes. Follows basic commands in the upper and lower extremity. Moves all extremities. Face is symmetric and expressive. No tremor.  Overall, neuroexam grossly unremarkable since yesterday  EYES: PERRL. No scleral icterus or conjunctival injection. No discharge.   HENT: Normocephalic, atraumatic. Hearing is grossly intact. Nares with Rhino Rocket in place.  Dried blood without active epistaxis. Mucous membranes very dry. Intermittent hypotensive on A-line  NECK: No JVD. Patient moves neck without restriction.   CARDIO: Rhythm regular. Normal rate. No murmur, rub, or gallop. Pulses equal bilaterally in the upper and lower extremity. No lower extremity edema.   PULM: Lungs clear to auscultation in all fields. No wheezes, rales, or rhonchi. No conversational dyspnea. No splinting, stridor, or accessory muscle use.    GI: Abdomen is soft and non-distended. No tenderness to palpation. No guarding or rigidity. Normoactive bowel sounds.   EXTREMITIES: Symmetric muscle bulk. No joint swelling. No clubbing, cyanosis, or deformity. Muscle strength 5/5 in b/l upper and lower  "extremities  SKIN: Healing both skin wounds on lower extremities bilaterally without serosanguineous drainage or purulence  PSYCH: Disoriented   Last Recorded Vitals  Blood pressure 126/71, pulse 98, temperature 36.3 °C (97.3 °F), temperature source Temporal, resp. rate (!) 31, height 1.651 m (5' 5\"), weight 98 kg (216 lb 0.8 oz), SpO2 96%.  Intake/Output last 3 Shifts:  I/O last 3 completed shifts:  In: 2054.8 (21 mL/kg) [I.V.:22.8 (0.2 mL/kg); Blood:732.1; NG/GT:150; IV Piggyback:1150]  Out: 1008 (10.3 mL/kg) [Urine:1008 (0.3 mL/kg/hr)]  Weight: 98 kg     Relevant Results  Scheduled medications  [Held by provider] atorvastatin, 40 mg, oral, Nightly  budesonide, 0.5 mg, nebulization, BID  [Held by provider] docusate sodium, 100 mg, oral, BID  [Held by provider] folic acid, 1 mg, nasogastric tube, Daily  formoterol, 20 mcg, nebulization, BID  [Held by provider] heparin (porcine), 5,000 Units, subcutaneous, q8h  hydrocortisone sodium succinate, 50 mg, intravenous, q8h  insulin lispro, 0-5 Units, subcutaneous, q4h  ipratropium-albuteroL, 3 mL, nebulization, TID  levETIRAcetam, 500 mg, intravenous, q12h  [Held by provider] melatonin, 5 mg, oral, Nightly  [Held by provider] mycophenolate, 1,000 mg, oral, BID  pantoprazole, 40 mg, intravenous, BID  piperacillin-tazobactam, 3.375 g, intravenous, q8h  [Held by provider] polyethylene glycol, 17 g, oral, Daily  [Held by provider] risperiDONE, 3 mg, nasogastric tube, BID  [Held by provider] sertraline, 25 mg, nasogastric tube, Nightly  [Held by provider] thiamine, 100 mg, nasogastric tube, Daily      Continuous medications  norepinephrine, 0-0.2 mcg/kg/min, Last Rate: Stopped (07/16/24 1515)      PRN medications  PRN medications: [Held by provider] acetaminophen, alteplase, dextrose, dextrose, glucagon, glucagon, ipratropium-albuteroL, magnesium sulfate, oxymetazoline, potassium chloride  Results from last 7 days   Lab Units 07/18/24  0416 07/18/24  0014 07/17/24  1430   WBC " AUTO x10*3/uL 2.8* 2.7* 3.3*   RBC AUTO x10*6/uL 2.41* 2.25* 2.39*   HEMOGLOBIN g/dL 7.1* 6.6* 7.1*     Results from last 7 days   Lab Units 07/18/24  0416 07/17/24  1430 07/17/24  0250 07/16/24  0252 07/15/24  1301 07/15/24  1114   SODIUM mmol/L 145 141 141 142  --  144   POTASSIUM mmol/L 4.0 4.2 4.5 4.5  --  4.4   CHLORIDE mmol/L 113* 110* 111* 112*  --  113*   CO2 mmol/L 21 21 21 20*  --  22   BUN mg/dL 31* 31* 31* 26*  --  28*   CREATININE mg/dL 2.48* 2.62* 2.58* 2.32*  --  2.41*   CALCIUM mg/dL 7.5* 7.7* 7.5* 7.9*  --  8.0*   PHOSPHORUS mg/dL 4.7 5.0* 5.0* 4.3   < >  --    MAGNESIUM mg/dL 1.98  --  2.04 1.87   < > 1.70   BILIRUBIN TOTAL mg/dL 0.5  --   --  0.3  --  0.3   ALT U/L 17  --   --  25  --  27   AST U/L 13  --   --  20  --  34    < > = values in this interval not displayed.       XR chest 1 view    Result Date: 7/17/2024  Interpreted By:  Dhaval Hensley, STUDY: XR CHEST 1 VIEW; 7/17/2024 10:59 am   INDICATION: Signs/Symptoms:increasing oxygen requirement.   COMPARISON: 07/15/2024   ACCESSION NUMBER(S): [id number]   ORDERING CLINICIAN: TEETEE MOONEY   TECHNIQUE: 1 view of the chest was performed.   FINDINGS: The lungs are adequately inflated. No acute consolidation, however, there is suggestion of patchy bilateral ground-glass opacity as well mild prominence of the interstitium which is nonspecific but can be seen with mild pulmonary edema as well as inflammatory or potentially atypical infectious etiologies. Possible minimal-small pleural effusions, limited in evaluation on this single portable view. No pneumothorax.  The cardiomediastinal silhouette is mildly enlarged.       Mild patchy bilateral ground-glass opacities as well as mild prominence of the interstitium. Possible minimal-small pleural effusions. Mild cardiomegaly. Findings are concerning for CHF and mild pulmonary edema however inflammatory or atypical infectious etiologies could have this appearance.   Signed by: Dhaval Hensley  7/17/2024 11:38 AM Dictation workstation:   [id number]    XR abdomen 1 view    Result Date: 7/16/2024  Interpreted By:  Katrina Brice, STUDY: XR ABDOMEN 1 VIEW;  7/16/2024 10:00 pm   INDICATION: Signs/Symptoms:ng placement.   COMPARISON: CT 07/15/2024   ACCESSION NUMBER(S): [id number]   ORDERING CLINICIAN: JAMAL LEBRON   FINDINGS: NG tube noted with tip overlying the gastric antrum region. Nonobstructive bowel gas pattern. Limited evaluation of pneumoperitoneum on supine imaging, however no gross evidence of free air is noted.   Visualized lungs are clear.   Osseous structures demonstrate no acute bony changes.   Severe left hip joint degenerative changes with joint space loss. Severe degenerative changes of the lower lumbar spine.       1. Nonobstructive bowel gas pattern 2. NG tube tip overlying the gastric antrum region.   MACRO: None   Signed by: Katrina Brice 7/16/2024 10:11 PM Dictation workstation:   [id number]      Assessment/Plan   Principal Problem:    Hypothermia, initial encounter      # Septic shock 2/2 diverticulitis, shock resolved  # LGIB, BRBPR  # Epistaxis, likely traumatic  # Acute blood loss anemia  # Recurrent secondary AI  -No additional bloody bowel movements.  Had watery brown bowel movements overnight. Appears very dry on exam  -Afebrile.  Normotensive off vasopressor support at 122/72.  On room air.  -Stable leukopenia. Slight worsening of PLT to 60. Hgb dropped again after 1U PRBC to 6.6. Received another unit (total 2 this admission) with hgb now 7.1  -DVT prophylaxis still held. Stable BUN/cr  -Stool PCR and C. difficile PCR negative.  Pending Giardia and O&P.  Blood cultures NTD. ICU team covering empirically with zosyn.  -If persistent and uncontrolled BRBPR, will consider emergent GI intervention, though remains at high risk for procedural complications to include perforation given current diverticulitis.   -Continue conservative management and PRBC / PLT transfusions as  indicated  -Risks vs benefits when considering CTA for active bleeding given renal failure  -Remains poor PEG candidate as detailed in note from 7/17  -Cont. GOC and hospice discussions with patient and family        Patient seen and discussed with attending physician    Yassine Win DO  Internal Medicine, PGY-III      I was present with the Resident who participated in the documentation of this note. I have personally seen and re-examined the patient and performed the medical decision-making components (assessment and plan of care). I have reviewed the Resident's documentation and verified the findings in the note as written with additions or exceptions as stated in the body of this note.    Antelmo Shi, DO

## 2024-07-18 NOTE — CARE PLAN
The patient's goals for the shift include      The clinical goals for the shift include Patient will remain HDS during shift    Over the shift, the patient did not make progress toward the following goals. Patient required blood transfusion of one unit due to hgb of 6.6. Patient tolerated well with no adverse effects. Patient is voicing needs at this time. Barriers to progression include acuteness of illness. Recommendations to address these barriers include continue POC.

## 2024-07-18 NOTE — PROGRESS NOTES
"Bing Holliday is a 63 y.o. female on day 3 of admission presenting with Hypothermia, initial encounter.    Subjective   Symptoms (0 - 10, Best to Worst)  [place] Symptom Assessment System  0-10 (Numeric) Pain Score: 0 - No pain         Objective     Physical Exam  Constitutional:       Appearance: She is obese. She is ill-appearing and toxic-appearing.   HENT:      Head: Normocephalic.      Right Ear: External ear normal.      Left Ear: External ear normal.      Nose: Nose normal.      Mouth/Throat:      Mouth: Mucous membranes are dry.   Eyes:      Conjunctiva/sclera: Conjunctivae normal.   Cardiovascular:      Rate and Rhythm: Normal rate.      Pulses: Normal pulses.   Pulmonary:      Effort: Pulmonary effort is normal.   Abdominal:      Palpations: Abdomen is soft.   Musculoskeletal:         General: Swelling present.      Cervical back: Normal range of motion.      Right lower leg: Edema present.      Left lower leg: Edema present.   Skin:     General: Skin is dry.      Capillary Refill: Capillary refill takes 2 to 3 seconds.      Findings: Lesion present.   Neurological:      Motor: Weakness present.         Last Recorded Vitals  Blood pressure 121/68, pulse 88, temperature 36 °C (96.8 °F), resp. rate 10, height 1.651 m (5' 5\"), weight 98 kg (216 lb 0.8 oz), SpO2 97%.  Intake/Output last 3 Shifts:  I/O last 3 completed shifts:  In: 2054.8 (21 mL/kg) [I.V.:22.8 (0.2 mL/kg); Blood:732.1; NG/GT:150; IV Piggyback:1150]  Out: 1008 (10.3 mL/kg) [Urine:1008 (0.3 mL/kg/hr)]  Weight: 98 kg     Relevant Results            Malnutrition Diagnosis Status: New  Malnutrition Diagnosis: Moderate malnutrition related to chronic disease or condition  As Evidenced by: acute on chronic nutritional stres with indicators including NFPE showing moderate muscle wasting/fat loss, unintentional weight loss, and intakes meeting <75% of needs for > 1 month.  I agree with the dietitian's malnutrition diagnosis.      Assessment/Plan " "  IMP:  Patient was able to speak today although she is weak. She was requesting breakfast and ICU RN was waiting on speech to evaluate for diet recommendations.   Patient denied pain or feelings of nausea. Her mouth was dry and mouth care was performed for her comfort.     Symptom Management  Pain: denies  Medications recommended for pain?  NA  Tiredness: yes  Nausea: denies  Depression: denies  Anxiety: denies  Drowsiness: observed  Appetite: requesting breakfast  Wellbeing: said she is \"doing OK\"  Dyspnea: no  Intervention recommended for dyspnea?  no  Other: emotional support  Intervention recommended for constipation?  NA    Provider estimate of survival: 1-6 months  Patient Prognostic Awareness: No - not appropriate for current discussion    Patient/proxy preference for information  Prefers full information    Goals of Care  Continue palliative support in the outpatient setting. Continue emotional support for family and patient.     Is the patient hospice-eligible?   Yes  Was a discussion held re hospice services?   no  Was a decision made re hospice services?  Yes    Other Palliative Support  Emotional support  Patient is a DNR/DNI    I spent 45 minutes in the review, care and planning for this patient.              Molly Chavez, APRN-CNS      "

## 2024-07-19 LAB
ALBUMIN SERPL BCP-MCNC: 2.5 G/DL (ref 3.4–5)
ALBUMIN SERPL BCP-MCNC: 2.5 G/DL (ref 3.4–5)
ALP SERPL-CCNC: 97 U/L (ref 33–136)
ALT SERPL W P-5'-P-CCNC: 14 U/L (ref 7–45)
ANION GAP SERPL CALC-SCNC: 11 MMOL/L (ref 10–20)
ANION GAP SERPL CALC-SCNC: 14 MMOL/L (ref 10–20)
AST SERPL W P-5'-P-CCNC: 12 U/L (ref 9–39)
BACTERIA BLD CULT: NORMAL
BACTERIA BLD CULT: NORMAL
BASOPHILS # BLD MANUAL: 0 X10*3/UL (ref 0–0.1)
BASOPHILS NFR BLD MANUAL: 0 %
BILIRUB SERPL-MCNC: 0.6 MG/DL (ref 0–1.2)
BUN SERPL-MCNC: 31 MG/DL (ref 6–23)
BUN SERPL-MCNC: 32 MG/DL (ref 6–23)
BURR CELLS BLD QL SMEAR: ABNORMAL
CALCIUM SERPL-MCNC: 7.6 MG/DL (ref 8.6–10.3)
CALCIUM SERPL-MCNC: 7.7 MG/DL (ref 8.6–10.3)
CHLORIDE SERPL-SCNC: 114 MMOL/L (ref 98–107)
CHLORIDE SERPL-SCNC: 114 MMOL/L (ref 98–107)
CO2 SERPL-SCNC: 22 MMOL/L (ref 21–32)
CO2 SERPL-SCNC: 22 MMOL/L (ref 21–32)
CREAT SERPL-MCNC: 2.05 MG/DL (ref 0.5–1.05)
CREAT SERPL-MCNC: 2.34 MG/DL (ref 0.5–1.05)
CRYPTOSP AG STL QL IA: NEGATIVE
EGFRCR SERPLBLD CKD-EPI 2021: 23 ML/MIN/1.73M*2
EGFRCR SERPLBLD CKD-EPI 2021: 27 ML/MIN/1.73M*2
EOSINOPHIL # BLD MANUAL: 0 X10*3/UL (ref 0–0.7)
EOSINOPHIL NFR BLD MANUAL: 0 %
ERYTHROCYTE [DISTWIDTH] IN BLOOD BY AUTOMATED COUNT: 21.5 % (ref 11.5–14.5)
G LAMBLIA AG STL QL IA: NEGATIVE
GLUCOSE BLD MANUAL STRIP-MCNC: 125 MG/DL (ref 74–99)
GLUCOSE BLD MANUAL STRIP-MCNC: 126 MG/DL (ref 74–99)
GLUCOSE BLD MANUAL STRIP-MCNC: 139 MG/DL (ref 74–99)
GLUCOSE BLD MANUAL STRIP-MCNC: 139 MG/DL (ref 74–99)
GLUCOSE BLD MANUAL STRIP-MCNC: 161 MG/DL (ref 74–99)
GLUCOSE BLD MANUAL STRIP-MCNC: 194 MG/DL (ref 74–99)
GLUCOSE SERPL-MCNC: 132 MG/DL (ref 74–99)
GLUCOSE SERPL-MCNC: 195 MG/DL (ref 74–99)
HCT VFR BLD AUTO: 24.5 % (ref 36–46)
HGB BLD-MCNC: 7.5 G/DL (ref 12–16)
IMM GRANULOCYTES # BLD AUTO: 0.56 X10*3/UL (ref 0–0.7)
IMM GRANULOCYTES NFR BLD AUTO: 12.2 % (ref 0–0.9)
LYMPHOCYTES # BLD MANUAL: 0.87 X10*3/UL (ref 1.2–4.8)
LYMPHOCYTES NFR BLD MANUAL: 19 %
MAGNESIUM SERPL-MCNC: 2.18 MG/DL (ref 1.6–2.4)
MCH RBC QN AUTO: 29.1 PG (ref 26–34)
MCHC RBC AUTO-ENTMCNC: 30.6 G/DL (ref 32–36)
MCV RBC AUTO: 95 FL (ref 80–100)
METAMYELOCYTES # BLD MANUAL: 0.14 X10*3/UL
METAMYELOCYTES NFR BLD MANUAL: 3 %
MONOCYTES # BLD MANUAL: 0.37 X10*3/UL (ref 0.1–1)
MONOCYTES NFR BLD MANUAL: 8 %
MYELOCYTES # BLD MANUAL: 0.14 X10*3/UL
MYELOCYTES NFR BLD MANUAL: 3 %
NEUTROPHILS # BLD MANUAL: 3.08 X10*3/UL (ref 1.2–7.7)
NEUTS BAND # BLD MANUAL: 0.14 X10*3/UL (ref 0–0.7)
NEUTS BAND NFR BLD MANUAL: 3 %
NEUTS SEG # BLD MANUAL: 2.94 X10*3/UL (ref 1.2–7)
NEUTS SEG NFR BLD MANUAL: 64 %
NRBC BLD MANUAL-RTO: 5 % (ref 0–0)
NRBC BLD-RTO: 3.7 /100 WBCS (ref 0–0)
PHOSPHATE SERPL-MCNC: 2.9 MG/DL (ref 2.5–4.9)
PHOSPHATE SERPL-MCNC: 3.9 MG/DL (ref 2.5–4.9)
PLATELET # BLD AUTO: 68 X10*3/UL (ref 150–450)
POLYCHROMASIA BLD QL SMEAR: ABNORMAL
POTASSIUM SERPL-SCNC: 3.4 MMOL/L (ref 3.5–5.3)
POTASSIUM SERPL-SCNC: 4 MMOL/L (ref 3.5–5.3)
PROT SERPL-MCNC: 4.6 G/DL (ref 6.4–8.2)
RBC # BLD AUTO: 2.58 X10*6/UL (ref 4–5.2)
RBC MORPH BLD: ABNORMAL
SODIUM SERPL-SCNC: 144 MMOL/L (ref 136–145)
SODIUM SERPL-SCNC: 146 MMOL/L (ref 136–145)
TOTAL CELLS COUNTED BLD: 100
WBC # BLD AUTO: 4.6 X10*3/UL (ref 4.4–11.3)

## 2024-07-19 PROCEDURE — 99232 SBSQ HOSP IP/OBS MODERATE 35: CPT | Performed by: NURSE PRACTITIONER

## 2024-07-19 PROCEDURE — 84100 ASSAY OF PHOSPHORUS: CPT

## 2024-07-19 PROCEDURE — 2500000004 HC RX 250 GENERAL PHARMACY W/ HCPCS (ALT 636 FOR OP/ED): Performed by: INTERNAL MEDICINE

## 2024-07-19 PROCEDURE — 80069 RENAL FUNCTION PANEL: CPT | Mod: CCI

## 2024-07-19 PROCEDURE — 80053 COMPREHEN METABOLIC PANEL: CPT

## 2024-07-19 PROCEDURE — 83735 ASSAY OF MAGNESIUM: CPT

## 2024-07-19 PROCEDURE — 94640 AIRWAY INHALATION TREATMENT: CPT

## 2024-07-19 PROCEDURE — 94660 CPAP INITIATION&MGMT: CPT

## 2024-07-19 PROCEDURE — 2020000001 HC ICU ROOM DAILY

## 2024-07-19 PROCEDURE — 85027 COMPLETE CBC AUTOMATED: CPT

## 2024-07-19 PROCEDURE — 99291 CRITICAL CARE FIRST HOUR: CPT

## 2024-07-19 PROCEDURE — 2500000004 HC RX 250 GENERAL PHARMACY W/ HCPCS (ALT 636 FOR OP/ED)

## 2024-07-19 PROCEDURE — 92610 EVALUATE SWALLOWING FUNCTION: CPT | Mod: GN | Performed by: STUDENT IN AN ORGANIZED HEALTH CARE EDUCATION/TRAINING PROGRAM

## 2024-07-19 PROCEDURE — 82947 ASSAY GLUCOSE BLOOD QUANT: CPT

## 2024-07-19 PROCEDURE — C9113 INJ PANTOPRAZOLE SODIUM, VIA: HCPCS

## 2024-07-19 PROCEDURE — 36415 COLL VENOUS BLD VENIPUNCTURE: CPT

## 2024-07-19 PROCEDURE — 2500000002 HC RX 250 W HCPCS SELF ADMINISTERED DRUGS (ALT 637 FOR MEDICARE OP, ALT 636 FOR OP/ED)

## 2024-07-19 PROCEDURE — 76937 US GUIDE VASCULAR ACCESS: CPT

## 2024-07-19 PROCEDURE — 2500000002 HC RX 250 W HCPCS SELF ADMINISTERED DRUGS (ALT 637 FOR MEDICARE OP, ALT 636 FOR OP/ED): Performed by: INTERNAL MEDICINE

## 2024-07-19 PROCEDURE — 37799 UNLISTED PX VASCULAR SURGERY: CPT

## 2024-07-19 PROCEDURE — 85007 BL SMEAR W/DIFF WBC COUNT: CPT

## 2024-07-19 PROCEDURE — 2500000001 HC RX 250 WO HCPCS SELF ADMINISTERED DRUGS (ALT 637 FOR MEDICARE OP)

## 2024-07-19 RX ORDER — FOLIC ACID 1 MG/1
1 TABLET ORAL DAILY
Status: DISCONTINUED | OUTPATIENT
Start: 2024-07-20 | End: 2024-07-30 | Stop reason: HOSPADM

## 2024-07-19 RX ORDER — POTASSIUM CHLORIDE 1.5 G/1.58G
20 POWDER, FOR SOLUTION ORAL ONCE
Status: COMPLETED | OUTPATIENT
Start: 2024-07-19 | End: 2024-07-19

## 2024-07-19 RX ORDER — PANTOPRAZOLE SODIUM 40 MG/10ML
40 INJECTION, POWDER, LYOPHILIZED, FOR SOLUTION INTRAVENOUS DAILY
Status: DISCONTINUED | OUTPATIENT
Start: 2024-07-19 | End: 2024-07-24

## 2024-07-19 RX ORDER — SERTRALINE HYDROCHLORIDE 50 MG/1
25 TABLET, FILM COATED ORAL NIGHTLY
Status: DISCONTINUED | OUTPATIENT
Start: 2024-07-19 | End: 2024-07-30 | Stop reason: HOSPADM

## 2024-07-19 RX ORDER — PANTOPRAZOLE SODIUM 40 MG/10ML
40 INJECTION, POWDER, LYOPHILIZED, FOR SOLUTION INTRAVENOUS ONCE
Status: DISCONTINUED | OUTPATIENT
Start: 2024-07-19 | End: 2024-07-19

## 2024-07-19 RX ORDER — ACETAMINOPHEN 325 MG/1
650 TABLET ORAL EVERY 4 HOURS PRN
Status: DISCONTINUED | OUTPATIENT
Start: 2024-07-19 | End: 2024-07-30 | Stop reason: HOSPADM

## 2024-07-19 RX ORDER — POTASSIUM CHLORIDE 20 MEQ/1
20 TABLET, EXTENDED RELEASE ORAL ONCE
Status: DISCONTINUED | OUTPATIENT
Start: 2024-07-19 | End: 2024-07-19

## 2024-07-19 RX ORDER — OXYMETAZOLINE HCL 0.05 %
2 SPRAY, NON-AEROSOL (ML) NASAL EVERY 12 HOURS PRN
Status: DISPENSED | OUTPATIENT
Start: 2024-07-19 | End: 2024-07-22

## 2024-07-19 RX ORDER — DEXTROSE MONOHYDRATE 50 MG/ML
50 INJECTION, SOLUTION INTRAVENOUS CONTINUOUS
Status: DISCONTINUED | OUTPATIENT
Start: 2024-07-19 | End: 2024-07-20

## 2024-07-19 RX ORDER — LANOLIN ALCOHOL/MO/W.PET/CERES
100 CREAM (GRAM) TOPICAL DAILY
Status: DISCONTINUED | OUTPATIENT
Start: 2024-07-20 | End: 2024-07-30 | Stop reason: HOSPADM

## 2024-07-19 ASSESSMENT — PAIN SCALES - GENERAL
PAINLEVEL_OUTOF10: 0 - NO PAIN

## 2024-07-19 ASSESSMENT — COGNITIVE AND FUNCTIONAL STATUS - GENERAL
WALKING IN HOSPITAL ROOM: TOTAL
TURNING FROM BACK TO SIDE WHILE IN FLAT BAD: TOTAL
CLIMB 3 TO 5 STEPS WITH RAILING: TOTAL
DAILY ACTIVITIY SCORE: 6
MOBILITY SCORE: 7
STANDING UP FROM CHAIR USING ARMS: TOTAL
MOVING FROM LYING ON BACK TO SITTING ON SIDE OF FLAT BED WITH BEDRAILS: A LOT
DRESSING REGULAR LOWER BODY CLOTHING: TOTAL
PERSONAL GROOMING: TOTAL
EATING MEALS: TOTAL
TOILETING: TOTAL
HELP NEEDED FOR BATHING: TOTAL
DRESSING REGULAR UPPER BODY CLOTHING: TOTAL
MOVING TO AND FROM BED TO CHAIR: TOTAL

## 2024-07-19 ASSESSMENT — PAIN - FUNCTIONAL ASSESSMENT
PAIN_FUNCTIONAL_ASSESSMENT: 0-10

## 2024-07-19 NOTE — PROGRESS NOTES
Speech-Language Pathology    Inpatient Clinical Swallow Evaluation    Patient Name: Bing Holliday  MRN: [id number]  Today's Date: 7/19/2024   Time Calculation  Start Time: 1141  Stop Time: 1202  Time Calculation (min): 21 min          Current Problem:   1. Hypothermia, initial encounter        2. Hypoglycemia        3. Hypotension, unspecified hypotension type  Central Line    Central Line    Critical Care    Critical Care      4. Septic shock (Multi)        5. Acute diverticulitis        6. Acute blood loss anemia        7. Adrenal insufficiency (Multi)        8. Systemic lupus erythematosus, organ or system involvement unspecified (Multi)          Recommendations:  Risk for Aspiration: Yes   Solid Diet Recommendations : Pureed/extremely thick  (IDDSI Level 4)   Liquid Diet Recommendations: Thin (IDDSI Level 0)  *When no longer on high flow oxygen, the patient is favorable to progress to baseline diet with regular solids.  Speech therapy is following but RN may advance if appropriate.  Aspiration precautions: Upright 90 degrees as possible for all oral intake, Remain upright for 20-30 minutes after meals, Alternate solids and liquids, Small bites/sips, Eat/feed slowly     Medication Administration Recommendations: Crushed, With Pureed      Assessment:  Consistencies Trialed: Consistencies Trialed: Thin (IDDSI Level 0) - Straw, Pureed/extremely thick (IDDSI Level 4), Regular (IDDSI Level 7)   Oral Motor: Within Functional Limits      Dentition: Adequate/Natural   Assessment Results: Patient presents with suspected functional pharyngeal swallow upon completion of a clinical swallow evaluation. Oral care with swabs and diet trials were completed by briefly removing the ventimask per RN. Patient was observed taking sequential sip of thin liquid via straw (>3oz) with no overt s/s of aspiration. Puree trial completed with appropriate bolus manipulation, A-P transfer and timely onset of swallow. Solid trial characterized  mildly prolonged mastication with a trace amount of prepared bolus escaping to right labia not past the vermilion border. Oral cavity was fully cleared. Because of increased oxygen needs and clinical presentation, it is recommended the patient resume an oral diet with modifications (pureed solids) and aspiration precautions above. As the patient improves medically her prognosis to advance to regular solids is likely.      Prognosis: Good   Medical Staff Made Aware: Yes     Baseline Assessment:  Baseline Assessment  Respiratory Status:  (30L Ventimask)  Patient Positioning: Upright in Bed     Relevant Imaging:  CXR 7/17 IMPRESSION:  Mild patchy bilateral ground-glass opacities as well as mild  prominence of the interstitium. Possible minimal-small pleural  effusions. Mild cardiomegaly. Findings are concerning for CHF and  mild pulmonary edema however inflammatory or atypical infectious  etiologies could have this appearance.    Patient is familiar to the department. Clinical swallow evaluations completed 1/20/24, 3/22/24 recommended regular/thin. Patient was on regular and thin during the last admission (July 2024).    Plan:  SLP Plan: Skilled SLP Skilled speech therapy for dysphagia treatment is warranted in order to provide training and instruction regarding the use of compensatory swallow strategies, oropharyngeal strengthening exercises, and pt/caregiver education in order to reduce risk of aspiration, dehydration and malnutrition.  SLP Frequency: 3x per week   Duration: Current admission   Next Treatment Priority: advance diet as tolerated   Discussed POC: Patient, Nursing   Discussed Risks/Benefits: Yes   Patient/Caregiver Agreeable: Yes   Patient will be discontinued from speech therapy when no further skilled needs are identified in this setting.   SLP Discharge Recommendations:  (Refer to subsequent notes but suspect no SLP needs anticipated)    Goals: Established 7/19/24  Patient will:  Patient/Caregiver  will demonstrate knowledge of taught compensatory strategies and dietary consistency recommendations to optimize functional swallow function without overt clinical signs and symptoms of aspiration or dysphagia    Subjective   Pt alert to person, location,  and year.    General Visit Information:  Pt. Admitted  7/15/24  Past medical history: Past Medical History Relevant to Rehab: 63 y.o. female presenting to ED and admitted to ICU due to hypotension, hypoglycemia, and hypothermia. CT showing small right effusion, small ascites, and diverticulitis.  GI consulted to evaluate noting uncomplicated diverticulitis and recommending atb and outpatient colonoscopy pending GOC.     Most recent admit here was from - for AMS and abnormal labs with multifactorial sepsis and adrenal insufficiency.  She has been admitted every month since 2024.  She has been to a number of nursing facilities and prior to last admit she was at [facility] and continues to get SNF care there.     Past Medical History: SLE, lupus cerebritis, RA, Raynaud's syndrome, hyperparathyroidism, adrenal insufficiency, COPD, DM, HTN, HLD, atrial fibrillation (not on anticoagulation), CKD, pacemaker, seizures, psychosis    Living Environment: Nursing home (skilled/long-term)     Ordering Physician: Jaziel Tyler DO     Reason for Referral: Patient was seen for a clinical swallow evaluation (CSE) to assess swallow function, determine least restrictive diet, determine if a modified barium swallow study is warranted and develop appropriate POC for the current setting.      Current Diet : Regular/thin   Prior to Session Communication: Bedside nurse   Pain:  Pain Assessment  Pain Assessment: 0-10  0-10 (Numeric) Pain Score: 0 - No pain     Education:  Learner:  Patient  Barriers to Learning: Cognitive limitations   Method: Verbal  Education - Topic: ST provided patient education regarding role of ST, purpose of assessment, clinical  impressions, goals of treatment, and plan of care. Patient verbalized full comprehension, consistent with cognitive status. Education will be reinforced. ST further coordinated with RN regarding recommendations and precautions per this assessment, with RN verbalizing understanding.  Outcome:  Verbalized understanding and agreement

## 2024-07-19 NOTE — PROGRESS NOTES
INPATIENT NEPHROLOGY CONSULT PROGRESS NOTE      Patient Name: Bing Holliday MRN: [id number]  DATE of SERVICE: July 19, 2024  TIME of SERVICE: 11:27 AM  CONSULTING SERVICE: Nephrology    REASON for CONSULT: JED    SUBJECTIVE:  Seen and evaluated at bedside. Reports discomfort from nasal packing.   Still with hemoptysis. Unable to have speech eval done. Remains NPO on venturi mask.     SUMMARY OF STAY:  Ms. Holliday is a 63 y.o. female who presented to [facility] 15, 2024 for evaluation of weakness noted at skilled nursing facility.  Diagnosed with septic shock, acute diverticulitis, acute diverticular bleed and admitted to the intensive care unit.  Patient with complex past medical history including lupus nephritis class V in 2011, systemic lupus erythematosus with chronic kidney disease stage IIIb,  Cerebritis and arthropathy, uncontrolled type 2 diabetes mellitus, paroxysmal atrial fibrillation, heart failure with reduced ejection fraction, coronary artery disease status post CABG 2013, COPD, pulmonary hypertension, hypertension, hyperlipidemia, GERD.    ASSESSMENT:  Acute kidney injury superimposed on chronic kidney disease stage IIIb:  -- Acute kidney injury likely ischemic related ATN in the setting of septic shock and acute GI bleed, peak serum creatinine up to 2.6 mg deciliter with drop in GFR to 20 mill per minute per 1.73 m²  --From baseline serum creatinine of 1.5 and EGFR of 40 mill per minute per 1.73 m²  --Immunocompromised host with pancytopenia  --Acute diverticulitis with diverticular bleed, receiving blood transfusion  --Montano catheter in place no signs of obstruction  --On background CKD 3B, lupus nephritis class V  -- Code status DNR/DNI no dialysis   -- Patient reports that her mother passed During hemodialysis session and she does not wish to be on hemodialysis.     Septic shock secondary to acute diverticulitis:   --Was  hypothermic on presentation  -- Shock resolved, off pressor support     Acute hypoxic respiratory failure requiring intermittent Venturi mask     Volume: Tendency to third space due to hypoalbuminemia, CKD, diabetes patient with underlying abdominal situs.  -- Chest x-ray with findings concerning for pulmonary edema  -- Intermittent volume expansion with oncotic solution to maintain hemodynamic stability.  -- Most likely will tolerate diuretics today as patient still n.p.o.     Acute blood loss anemia, GI team following patient at high risk to have procedure.     Immunocompromised     Systemic lupus erythematosus managed with mycophenolate and prednisone.     Adrenal insufficiency: On hydrocortisone 50 mg 3 times daily.     Pancytopenia     Pulmonary hypertension     Paroxysmal atrial fibrillation, rate controlled       PLAN:  Acute kidney injury superimposed on chronic kidney disease stage IIIb, acute kidney injury multifactorial component of ischemic related ATN, sepsis related ATN.  Currently with mild volume overload/venous congestion Post resuscitation improving, pt auto-diuresing.      Hypernatremia to cont D5W at 50 ml/min for 1 L.   Patient remains n.p.o. pending swallow evaluation.   Once able to pass swallow eval to start protein supplements 3 times daily.   okay to place PICC line if needed.   No indication for RRT.     Strict input and output to guide volume management.     Will follow, thank you!    Medications:    Current Facility-Administered Medications:     [Held by provider] acetaminophen (Tylenol) tablet 650 mg, 650 mg, nasogastric tube, q4h PRN, Jaziel Tyler DO    alteplase (Cathflo Activase) injection 2 mg, 2 mg, intra-catheter, PRN, Krista Lambert MD    [Held by provider] atorvastatin (Lipitor) tablet 40 mg, 40 mg, oral, Nightly, Jaziel Tyler DO    budesonide (Pulmicort) 0.5 mg/2 mL nebulizer solution 0.5 mg, 0.5 mg, nebulization, BID, Krista Lambert MD, 0.5 mg at 07/19/24 1020    dextrose 5%  infusion, 50 mL/hr, intravenous, Continuous, Vikram Perez MD    dextrose 50 % injection 12.5 g, 12.5 g, intravenous, q15 min PRN, Jaziel Tyler DO    dextrose 50 % injection 25 g, 25 g, intravenous, q15 min PRN, Jaziel Tyler DO    [Held by provider] docusate sodium (Colace) capsule 100 mg, 100 mg, oral, BID, Jaziel Tyler DO    [Held by provider] folic acid (Folvite) tablet 1 mg, 1 mg, nasogastric tube, Daily, Jaziel Tyler DO    formoterol (Perforomist) 20 mcg/2 mL nebulizer solution 20 mcg, 20 mcg, nebulization, BID, Krista Lambert MD, 20 mcg at 07/19/24 1020    glucagon (Glucagen) injection 1 mg, 1 mg, intramuscular, q15 min PRN, Jaziel Tyler DO    glucagon (Glucagen) injection 1 mg, 1 mg, intramuscular, q15 min PRN, Jaziel Tyler DO    [Held by provider] heparin (porcine) injection 5,000 Units, 5,000 Units, subcutaneous, q8h, Jaziel Tyler DO, 5,000 Units at 07/16/24 2349    hydrocortisone sod succ (PF) (Solu-CORTEF) injection 50 mg, 50 mg, intravenous, q8h, Avi ERIC Sloan, DO, 50 mg at 07/19/24 0924    insulin lispro (HumaLOG) injection 0-5 Units, 0-5 Units, subcutaneous, q4h, Jaziel Tyler DO, 1 Units at 07/17/24 0824    ipratropium-albuteroL (Duo-Neb) 0.5-2.5 mg/3 mL nebulizer solution 3 mL, 3 mL, nebulization, q4h PRN, Jaziel Tyler DO    ipratropium-albuteroL (Duo-Neb) 0.5-2.5 mg/3 mL nebulizer solution 3 mL, 3 mL, nebulization, TID, Krista Lambert MD, 3 mL at 07/19/24 1020    levETIRAcetam (Keppra) 500 mg in sodium chloride (iso)  mL, 500 mg, intravenous, q12h, Jaziel Tyler DO, Stopped at 07/19/24 0426    magnesium sulfate 2 g in sterile water for injection 50 mL, 2 g, intravenous, q6h PRN, Donald Busby MD, Stopped at 07/18/24 0800    [Held by provider] melatonin tablet 5 mg, 5 mg, oral, Nightly, Jaziel Tyler DO    [Held by provider] mycophenolate (Cellcept) capsule 1,000 mg, 1,000 mg, oral, BID, Jaziel Tyler DO    oxygen (O2) therapy, , inhalation, Continuous PRN - O2/gases, Krista Lambert MD, 30  percent at 07/18/24 2047    oxymetazoline (Afrin) 0.05 % nasal spray 2 spray, 2 spray, Each Nostril, q12h PRN, Donald Busby MD, 2 spray at 07/17/24 0415    pantoprazole (ProtoNix) injection 40 mg, 40 mg, intravenous, Once, Shalom Sage MD    piperacillin-tazobactam (Zosyn) 3.375 g in dextrose (iso) IV 50 mL, 3.375 g, intravenous, q8h, Jaziel Tyler DO, Stopped at 07/19/24 0811    [Held by provider] polyethylene glycol (Glycolax, Miralax) packet 17 g, 17 g, oral, Daily, Jaziel Tyler DO    potassium chloride 40 mEq in sterile water for injection 100 mL, 40 mEq, intravenous, q6h PRN, Donald Busby MD    [Held by provider] risperiDONE (RisperDAL) tablet 3 mg, 3 mg, nasogastric tube, BID, Jaziel Tyler DO    [Held by provider] sertraline (Zoloft) tablet 25 mg, 25 mg, nasogastric tube, Nightly, Jaziel Tyler DO    [Held by provider] sodium chloride (Ocean) 0.65 % nasal spray 2 spray, 2 spray, Each Nostril, TID, Evaristo Rolle DO    [Held by provider] thiamine (Vitamin B-1) tablet 100 mg, 100 mg, nasogastric tube, Daily, Jaziel Tyler DO    PERTINENT ROS:  GENERAL:  positive for fatigue, poor appetite.  No fever/chills  RESPIRATORY:  positive for shortness of breath.  Negative for cough, wheezing.  CARDIOVASCULAR:   Negative for chest pain or palpitation.  GI:  Negative for abdominal pain, diarrhea, heartburn, nausea, vomiting  : + ruano     Physical Exam:  Vital signs in last 24 hours:  Temp:  [36 °C (96.8 °F)-36.6 °C (97.9 °F)] 36.3 °C (97.3 °F)  Heart Rate:  [] 92  Resp:  [10-28] 16  BP: (113-162)/(62-96) 144/63  Arterial Line BP 1: (110-130)/(60-70) 130/70  FiO2 (%):  [30 %] 30 %    General: Awake, on venturi mask  HEENT:  + nasal packing   NECK:  no  JVD, no carotid bruit, supple, no cervical mass or thyromegaly  LUNGS;  Diminished breath sounds, no Rales  CV:  Distant, regular rate and rhythm, no murmurs  ABDOMEN:  abdomen soft, nontender, BS normal, no masses or organomegaly  EDEMA:  no lower  extremity edema/dependent edema  SKIN:  dry and normal turgor, no clubbing, cyanosis or petechia.  No rashes noted  : + ruano     Intake/Output last 3 shifts:  I/O last 3 completed shifts:  In: 1167.5 (11.9 mL/kg) [I.V.:237.5 (2.4 mL/kg); Blood:330; IV Piggyback:600]  Out: 1555 (15.9 mL/kg) [Urine:1555 (0.4 mL/kg/hr)]  Weight: 97.8 kg     DATA:  Diagnotic tests reviewed for Todays visit:  Results from last 7 days   Lab Units 07/19/24  0403   WBC AUTO x10*3/uL 4.6   RBC AUTO x10*6/uL 2.58*   HEMOGLOBIN g/dL 7.5*   HEMATOCRIT % 24.5*     Results from last 7 days   Lab Units 07/19/24  0403   SODIUM mmol/L 146*   POTASSIUM mmol/L 4.0   CHLORIDE mmol/L 114*   CO2 mmol/L 22   BUN mg/dL 31*   CREATININE mg/dL 2.34*   CALCIUM mg/dL 7.7*   PHOSPHORUS mg/dL 3.9   MAGNESIUM mg/dL 2.18   BILIRUBIN TOTAL mg/dL 0.6   ALT U/L 14   AST U/L 12     Results from last 7 days   Lab Units 07/15/24  1245   COLOR U  Yellow   APPEARANCE U  Clear   PH U  5.0   SPEC GRAV UR  1.014   PROTEIN U mg/dL 30 (1+)*   BLOOD UR  NEGATIVE   NITRITE U  NEGATIVE   WBC UR /HPF 11-20*     IMAGING: CXR reviewed in  images      SIGNATURE: Vikram Perez MD  Nephrology and Hypertension  [address] [facility] [place]  Office phone: [phone]  FAX: [phone]    This note was partially generated using the Dragon voice recognition system, and there may be some incorrect words, spelling's and punctuation that were not noted in checking the note before saving.

## 2024-07-19 NOTE — CARE PLAN
The patient's goals for the shift include  to have a meal.     The clinical goals for the shift include Pt will remain HDS this shift.      Problem: Skin  Goal: Decreased wound size/increased tissue granulation at next dressing change  Outcome: Progressing  Goal: Participates in plan/prevention/treatment measures  Outcome: Progressing  Goal: Prevent/manage excess moisture  Outcome: Progressing  Goal: Prevent/minimize sheer/friction injuries  Outcome: Progressing  Goal: Promote/optimize nutrition  Outcome: Progressing  Goal: Promote skin healing  Outcome: Progressing     Problem: Fall/Injury  Goal: Not fall by end of shift  Outcome: Progressing  Goal: Be free from injury by end of the shift  Outcome: Progressing  Goal: Verbalize understanding of personal risk factors for fall in the hospital  Outcome: Progressing  Goal: Verbalize understanding of risk factor reduction measures to prevent injury from fall in the home  Outcome: Progressing  Goal: Use assistive devices by end of the shift  Outcome: Progressing  Goal: Pace activities to prevent fatigue by end of the shift  Outcome: Progressing     Problem: Pain - Adult  Goal: Verbalizes/displays adequate comfort level or baseline comfort level  Outcome: Progressing     Problem: Safety - Adult  Goal: Free from fall injury  Outcome: Progressing     Problem: Discharge Planning  Goal: Discharge to home or other facility with appropriate resources  Outcome: Progressing     Problem: Chronic Conditions and Co-morbidities  Goal: Patient's chronic conditions and co-morbidity symptoms are monitored and maintained or improved  Outcome: Progressing     Problem: Diabetes  Goal: Achieve decreasing blood glucose levels by end of shift  Outcome: Progressing  Goal: Increase stability of blood glucose readings by end of shift  Outcome: Progressing  Goal: Decrease in ketones present in urine by end of shift  Outcome: Progressing  Goal: Maintain electrolyte levels within acceptable range  throughout shift  Outcome: Progressing  Goal: Maintain glucose levels >70mg/dl to <250mg/dl throughout shift  Outcome: Progressing  Goal: No changes in neurological exam by end of shift  Outcome: Progressing  Goal: Learn about and adhere to nutrition recommendations by end of shift  Outcome: Progressing  Goal: Vital signs within normal range for age by end of shift  Outcome: Progressing  Goal: Increase self care and/or family involovement by end of shift  Outcome: Progressing  Goal: Receive DSME education by end of shift  Outcome: Progressing

## 2024-07-19 NOTE — PROGRESS NOTES
"    Endocrinology Inpatient Consult Progress Note     PATIENT NAME: Bing Holliday  MRN: [id number]  DATE: 7/19/2024    CONSULTING PHYSICIAN: Dr. Lambert  REASON FOR CONSULT: AI.      Interval Events     No acute events.   RD at the bedside.   Minimal PO intake  Patient reports she feels better.   Some dyspnea.  She reports that she took her pills when she was at the SNF.      Physical Examination     /77   Pulse 106   Temp 36.3 °C (97.3 °F) (Temporal)   Resp 18   Ht 1.651 m (5' 5\")   Wt 97.8 kg (215 lb 9.8 oz)   LMP  (LMP Unknown)   SpO2 94%   BMI 35.88 kg/m²   NAD  Temporal wasting.  RRR  Non labored resp  Nil pedal edema      Medications     Reviewed MAR       Data     Recent Labs and Imaging Reviewed      Assessment / Plan        # Adrenal Insufficiency  Per initial consult note.   Concern for pocketing pills per initial RD note.   It makes sense.   She presents with hypoadrenal symptoms.   I counseled her about this.   I explained it could be fatal.  Decreaes IV HC to 25mg q8h.   I am not opposed to a durable way to give her meds (ie. CorPak v PEG) given pocketing concerns.     # Hypoglycemia  # Uncontrolled Type 2 Diabetes Mellitus  Per my many notes in the past.  Patient present with hypoglycemia.  This is likely in the setting of adrenal crisis.  Continue to check her sugars with meals and at bedtime's.     # Pancytopenia  Maybe related to MMF which has been held.     # Abnormal TFTs  I have reviewed the patient's TSH values for the last 5 years.  She has been more time in the hospital than outside of the hospital during this time.  She has multiple TSH values which I suspect have been ordered due to encephalopathy.  She does not have any TSH values greater than 10.  On admission it was in the 4 range.  I do think this is secondary to nonthyroidal illness.  Her free T4 is normal which is reassuring.  I recommend no intervention on her TSH value.     # Osteoporosis  In the setting of chronic " glucocorticoid use. This will be further managed as an outpatient. She probably would benefit from a bisphosphonate, these agents are best studied with steroid-induced adynamic bone disease.         Avi Sloan, DO  Endocrinology, Diabetes, and Metabolism    Available via [employer] Messenger    Please excuse any typographical or unwanted errors within this documentation as voice recognition software was used to dictate this note.

## 2024-07-19 NOTE — NURSING NOTE
Pts son spoke with pt and MD again last night. Goals of care discussed between pt and her son López as she is more alert and oriented. Per son pt is not ready for hospice care at this time, this is not her goal. [facility] spoke with López again this morning, who restated that he is not interested in hospice care for pt at this time. He would like to continue with outpatient palliative services through [facility] and have pt go to SNF.     [facility] to follow up with pt/family in the outpatient setting for palliative services and ongoing goals of care.    No further palliative needs, will sign off.

## 2024-07-19 NOTE — PROGRESS NOTES
Critical Care Daily Progress        Subjective   Patient is a 63 y.o. female admitted on 7/15/2024 10:13 AM to the ICU for shock secondary to adrenal sufficiency and/or sepsis.    Overnight Events:   Right nasal packing removed yesterday afternoon. Left repacked with Merocel. Overnight, patient had some bleeding from the right nostril, no anterior source found. Unclear if more posterior or if bleeding was transferred from the left. Afrin and gauze applied. No events otherwise.    Patient seen and evaluated this morning. She does not feel well. She wants to try eating. Oriented to self, month, and year. No specific complaints. Denies abdominal pain. Right nasal gauze removed this morning, but bled again about an hour later. Repacked with Afrin soaked cotton balls.    Scheduled Medications:   [Held by provider] atorvastatin, 40 mg, oral, Nightly  budesonide, 0.5 mg, nebulization, BID  [Held by provider] docusate sodium, 100 mg, oral, BID  [Held by provider] folic acid, 1 mg, nasogastric tube, Daily  formoterol, 20 mcg, nebulization, BID  [Held by provider] heparin (porcine), 5,000 Units, subcutaneous, q8h  hydrocortisone sodium succinate, 25 mg, intravenous, q8h  insulin lispro, 0-5 Units, subcutaneous, q4h  ipratropium-albuteroL, 3 mL, nebulization, TID  levETIRAcetam, 500 mg, intravenous, q12h  [Held by provider] melatonin, 5 mg, oral, Nightly  [Held by provider] mycophenolate, 1,000 mg, oral, BID  pantoprazole, 40 mg, intravenous, Daily  piperacillin-tazobactam, 3.375 g, intravenous, q8h  [Held by provider] polyethylene glycol, 17 g, oral, Daily  [Held by provider] risperiDONE, 3 mg, nasogastric tube, BID  [Held by provider] sertraline, 25 mg, nasogastric tube, Nightly  [Held by provider] sodium chloride, 2 spray, Each Nostril, TID  [Held by provider] thiamine, 100 mg, nasogastric tube, Daily         Continuous Medications:   dextrose 5%, 50 mL/hr, Last Rate: 50 mL/hr (07/19/24 6898)           PRN Medications:    PRN medications: [Held by provider] acetaminophen, alteplase, dextrose, dextrose, glucagon, glucagon, ipratropium-albuteroL, magnesium sulfate, oxygen, oxymetazoline, potassium chloride    Objective   Vitals:  Temp  Min: 35.7 °C (96.3 °F)  Max: 36.6 °C (97.9 °F)  Pulse  Min: 74  Max: 106  BP  Min: 113/84  Max: 162/96  Resp  Min: 10  Max: 28  SpO2  Min: 91 %  Max: 100 %    Physical Exam:   Physical Exam   Physical Exam  Vitals and nursing note reviewed.   Constitutional:       Appearance: She is well-developed. She is ill-appearing.   HENT:      Head: Normocephalic and atraumatic.      Right Ear: External ear normal.      Left Ear: External ear normal.      Nose:      Comments: Merocel in left nostril, gauze in right nostril.     Mouth/Throat:      Mouth: Mucous membranes are dry.   Eyes:      General: No scleral icterus.     Extraocular Movements: Extraocular movements intact.      Conjunctiva/sclera: Conjunctivae normal.      Pupils: Pupils are equal, round, and reactive to light.   Cardiovascular:      Rate and Rhythm: Normal rate and regular rhythm.      Heart sounds: No murmur heard.  Pulmonary:      Effort: Pulmonary effort is normal. No respiratory distress.      Breath sounds: Normal breath sounds.   Abdominal:      Palpations: Abdomen is soft.      Tenderness: There is no abdominal tenderness. There is no guarding or rebound.   Musculoskeletal:         General: No swelling.      Cervical back: Neck supple.   Skin:     General: Skin is warm and dry.      Comments: Old, closed skin wounds on lower legs bilaterally. Do not appear infected.   Neurological:      Mental Status: She is alert. She is disoriented.      Comments: Awake, alert, answers questions. Oriented to person, month, and year. Follows commands.                Assessment/Plan   Overall assessment:  Patient is a 63-year-old female admitted to the ICU for shock, suspect secondary to adrenal insufficiency but sepsis from diverticulitis also within  the differential.     Neuro: Encephalopathy secondary to hypoperfusion, sepsis  - History: Lupus cerebritis  - Home meds: Continue home Keppra.  Holding home sertraline, risperidone until mentation improves  - [facility] ICU    Cardiovascular: Shock secondary to adrenal insufficiency and/or sepsis  - History: Atrial fibrillation no longer on anticoagulation, pacemaker, hypertension, hyperlipidemia  - Goals: MAP> 65  - Home meds: Resume home atorvastatin  - Last echo: LVEF 40 to 45% (5/2024)  -Norepinephrine drip discontinued 7/16    Pulmonary:   FiO2 (%):  [30 %] 30 %   - History: COPD  - Home meds: Continue DuoNeb and Pulmicort as needed for wheezing  Continuous pulse oximetry   O2 PRN to maintain SpO2 > 94%, wean as tolerated  - Imaging: Chest X-ray 7/18 shows pulmonary edema and minimal-small pleural effusions. Infectious process cannot be ruled out, but clinically less likely to be a pneumonia - already on Zosyn. No fevers or hemodynamic instability.    Gastrointestinal: Diverticulitis at the hepatic flexure   Results from last 7 days   Lab Units 07/19/24  0403 07/18/24  1654 07/18/24  0416 07/17/24  0912 07/16/24  0252 07/15/24  1114   INR   --  1.0  --  1.1  --  1.0   ALK PHOS U/L 97  --  104  --  177* 134   AST U/L 12  --  13  --  20 34   ALT U/L 14  --  17  --  25 27   LIPASE U/L  --   --   --   --   --  21       - Diet: Pureed diet, passed swallow eval with SLP  - GI on consult for diverticulitis. Continue supportive care. Outpatient colonoscopy in approximately six weeks if appropriate for goals of care. Poor candidate for PEG tube placement at this time.  - GI reconsulted for the GI bleed. Poor candidate for colonoscopy at this time given the inability to complete a bowel prep and active diverticulitis.  - Prophylaxis: Continue home Protonix 40mg daily  - Bowel regimen: None  - Last BM: Last BM Date: 07/19/24    Renal:  Acute kidney injury on chronic kidney disease, suspect secondary to initial  hypoperfusion/hypovolemia/acute illness  Results from last 7 days   Lab Units 07/19/24  0403 07/18/24  2216 07/18/24  0416 07/17/24  1430 07/17/24  0250   SODIUM mmol/L 146* 147* 145 141 141   POTASSIUM mmol/L 4.0 3.7 4.0 4.2 4.5   MAGNESIUM mg/dL 2.18 2.25 1.98  --  2.04   PHOSPHORUS mg/dL 3.9 4.0 4.7 5.0* 5.0*   BUN mg/dL 31* 30* 31* 31* 31*   CREATININE mg/dL 2.34* 2.37* 2.48* 2.62* 2.58*       Net IO Since Admission: 3,178.46 mL [07/19/24 1229]  - Daily CMP,Mg,Phos  - History: CKD, baseline creatinine 1.4  - [place]ano with strict I/O   - FeNa 0.4%, more consistent with pre-renal  - Nephrology on consult for persistent JED  - Ok for pulses of Lasix if needed for fluid overload  - New Hypernatremia 147, given small amount of D5W overnight, Na 146 after. D5W @ 50ml/hr x1 day per nephrology.  - Electrolytes: Replete per protocol, goal K>4 Phos >3 Mg >2    Endocrine: Adrenal insufficiency  Results from last 7 days   Lab Units 07/19/24  1151 07/19/24  0749 07/19/24  0418 07/19/24  0403 07/19/24  0133 07/18/24  2216 07/18/24  2028 07/18/24  1605 07/15/24  1134 07/15/24  1114   POCT GLUCOSE mg/dL 125* 126* 139*  --  139*  --  120* 104*   < >  --    GLUCOSE mg/dL  --   --   --  132*  --  126*  --   --    < > 49*   TSH mIU/L  --   --   --   --   --   --   --   --   --  4.67*    < > = values in this interval not displayed.      -History: Adrenocortical insufficiency, diabetes  -Home meds: Holding Florinef and hydrocortisone, administering Solu-Cortef  -Endocrinology on consult, Solucortef 25mg q8h  -sliding scale: Insulin lispro  -BG < 180 goal    Rheumatology:  -History: Rheumatoid arthritis  -Holding mycophenolate due to risk of bone marrow suppression  -Reached out to patient's rheumatologist, Dr. Hoyos, in [place] on 7/18. Ok to stop hold Cellcept in the setting of acute illness. Limited ability to evaluate the patient over the phone. Patient has not seen her outpatient in at least a year. We have a lower suspicion for  acute flare of lupus at this time.  -CRP elevated at 4.19 but ESR normal at 11.    ENT:  -Bilateral epistaxis and nasal packing after Corpak attempt  -ENT on consult, continuing to follow. Left nostril packed with Merocel by ENT, right nostril continues to bleed intermittently. ENT evaluated patient, ok to pack right nostril with smaller rhino rocket and Afrin. Unable to posteriorly pack bilaterally due to hypoxia. Ok to use Afrin longer than three days.  -Patient already on Zosyn    Hematology:  Results from last 7 days   Lab Units 24  0403 24  1654 24  0416 24  0014   HEMOGLOBIN g/dL 7.5* 8.0* 7.1* 6.6*   HEMATOCRIT % 24.5* 25.7* 23.1* 21.8*   PLATELETS AUTO x10*3/uL 68* 73* 60* 66*     - Daily CBC  -History: Chronic thrombocytopenia  - DVT Prophylaxis: Holding due to GI bleed, ok to resume from ENT perspective    Infectious Disease: Diverticulitis  Results from last 7 days   Lab Units 24  0403 24  1654 24  0416 24  0014 24  1430 24  0904 24  0250   WBC AUTO x10*3/uL 4.6 4.5 2.8* 2.7*   < > 3.3* 2.8*   SED RATE mm/h  --   --   --   --   --  11  --    CRP mg/dL  --   --   --   --   --   --  4.19*    < > = values in this interval not displayed.     Temp (24hrs), Av.2 °C (97.2 °F), Min:35.7 °C (96.3 °F), Max:36.6 °C (97.9 °F)     -Cultures: Blood and urine pending  -Hx E. Faecium UTI, urine culture negative on this admission  -Abx: Zosyn x7 days for diverticulitis.  -Stool pathogen and C diff negative    Musculoskeletal:   - PT to see   - OT to see       LDA:  CVC 07/15/24 Triple lumen Left Internal jugular (Active)   Placement Date/Time: 07/15/24 1500   Hand Hygiene Performed Prior to CVC Insertion: Yes  Site Prep: Chlorhexidine   Site Prep Agent has Completely Dried Before Insertion: Yes  All 5 Sterile Barriers Used (Gloves, Gown, Cap, Mask, Large Sterile Drape):...   Number of days: 0       Arterial Line 07/15/24 Right Radial (Active)    Placement Date/Time: 07/15/24 (c) 0111   Size: 20 G  Orientation: Right  Location: Radial  Site Prep: Chlorhexidine   Local Anesthetic: Injectable  Insertion attempts: 1  Securement Method: Transparent dressing;Taped   Number of days: 0       Urethral Catheter (Active)   Placement Date/Time: 07/15/24 1300     Number of days: 0         CODE STATUS: DNR and No Intubation    Disposition:     RESTRAINTS: type: None    ABCDEF Checklist  Analgesia: Spontaneous awakening trial to be pursued if clinically appropriate. RASS goal reviewed  Breathing: Spontaneous breathing trial to be pursued if clinically appropriate. Mechanical power of assisted ventilation reviewed  Choice of analgesia/sedation: Analgesic and sedative agents adjusted per clinical context.   Delirium assessed by CAM, will avoid exacerbating factors  Early mobility and exercise: Physical and occupational therapy engaged  Family: Plan of care, overall trajectory of patient shared with family. Questions elicited and answered as appropriate.     Due to the high probability of life threatening clinical decompensation, the patient required critical care time evaluating and managing this patient.  Critical care time included obtaining a history, examining the patient, ordering and reviewing studies, discussing, developing, and implementing a management plan, evaluating the patient's response to treatment, and discussion with other care team providers. I saw and evaluated the patient myself.  Critical care time was performed exclusive of billable procedures.      Case discussed with attending , Dr. Fausto Tyler DO

## 2024-07-19 NOTE — PROGRESS NOTES
Speech-Language Pathology                 Therapy Communication Note    Patient Name: Bing Holliday  MRN: [id number]  Today's Date: 7/18/2024    Discipline: Speech Language Pathology    Missed Visit Reason: Attempted pt's clinical swallow evaluation this date. RN indicated pt is not medically appropriate for same at this time due to a combination of high flow O2 requirement along with complications arising from epistaxis. RN recommends that ST return tomorrow 7/19/24 to conduct pt's evaluation. The plan, then, is to see pt for her Clinical Swallow Evaluation pending medical status. RN in agreement.     Missed Time: 8476

## 2024-07-19 NOTE — CARE PLAN
The patient's goals for the shift include      The clinical goals for the shift include Pt will remain HDS this shift.    Over the shift, the patient did not make progress toward the following goals. Barriers to progression include acuteness of illness. Recommendations to address these barriers include continue POC.

## 2024-07-19 NOTE — PROGRESS NOTES
"Subjective  Pt sitting up in bed in NAD. Awake, lethargic, though answering some questions. No current signs of GIB, though continues to have epistaxis. Hgb 7.5 this am. Plan to continue to encourage goals of care discussion.     Objective  Blood pressure 140/77, pulse 106, temperature 36.3 °C (97.3 °F), temperature source Temporal, resp. rate 18, height 1.651 m (5' 5\"), weight 97.8 kg (215 lb 9.8 oz), SpO2 94%.    Physical Exam  Constitutional: Awake, minimally interactive, in NAD  Eyes: PERRL, sclera clear, no conjunctival injection  Skin: Warm and dry, no rash or ecchymosis  ENMT: Mucous membranes moist, no lesions noted, epistaxis noted  Resp: CTAB, even and unlabored, venti mask  CV: RRR, normal S1, S2, no m,r,g  GI: +BS, soft, round, NT, no rebound tenderness or guarding, no palpable masses or organomegaly  Extremities: Extremities warm, bhargav LE pitting edema, no contusions or cyanosis, bhargav LE wounds with dressings intact  Neuro: Alert and oriented x1-2  Psych: flat affect    Medications  Scheduled medications  [Held by provider] atorvastatin, 40 mg, oral, Nightly  budesonide, 0.5 mg, nebulization, BID  [Held by provider] docusate sodium, 100 mg, oral, BID  [Held by provider] folic acid, 1 mg, nasogastric tube, Daily  formoterol, 20 mcg, nebulization, BID  [Held by provider] heparin (porcine), 5,000 Units, subcutaneous, q8h  hydrocortisone sodium succinate, 50 mg, intravenous, q8h  insulin lispro, 0-5 Units, subcutaneous, q4h  ipratropium-albuteroL, 3 mL, nebulization, TID  levETIRAcetam, 500 mg, intravenous, q12h  [Held by provider] melatonin, 5 mg, oral, Nightly  [Held by provider] mycophenolate, 1,000 mg, oral, BID  pantoprazole, 40 mg, intravenous, BID  piperacillin-tazobactam, 3.375 g, intravenous, q8h  [Held by provider] polyethylene glycol, 17 g, oral, Daily  [Held by provider] risperiDONE, 3 mg, nasogastric tube, BID  [Held by provider] sertraline, 25 mg, nasogastric tube, Nightly  [Held by provider] " sodium chloride, 2 spray, Each Nostril, TID  [Held by provider] thiamine, 100 mg, nasogastric tube, Daily      Continuous medications     PRN medications  PRN medications: [Held by provider] acetaminophen, alteplase, dextrose, dextrose, glucagon, glucagon, ipratropium-albuteroL, magnesium sulfate, oxygen, oxymetazoline, potassium chloride     Labs  Lab Results   Component Value Date    WBC 4.6 07/19/2024    HGB 7.5 (L) 07/19/2024    HCT 24.5 (L) 07/19/2024    MCV 95 07/19/2024    PLT 68 (L) 07/19/2024     Lab Results   Component Value Date    GLUCOSE 132 (H) 07/19/2024    CALCIUM 7.7 (L) 07/19/2024     (H) 07/19/2024    K 4.0 07/19/2024    CO2 22 07/19/2024     (H) 07/19/2024    BUN 31 (H) 07/19/2024    CREATININE 2.34 (H) 07/19/2024     Lab Results   Component Value Date    ALT 14 07/19/2024    AST 12 07/19/2024    GGT 63 (H) 01/07/2021    ALKPHOS 97 07/19/2024    BILITOT 0.6 07/19/2024     Lab Results   Component Value Date    IRON 157 (H) 06/14/2024    TIBC 220 (L) 06/14/2024    FERRITIN 1,060 (H) 04/25/2024     Lab Results   Component Value Date    INR 1.0 07/18/2024    INR 1.1 07/17/2024    INR 1.0 07/15/2024    PROTIME 11.7 07/18/2024    PROTIME 12.5 07/17/2024    PROTIME 11.8 07/15/2024       Radiology  CT chest, A/P 7/15/24 noting:  IMPRESSION:  In comparison to the recent study, a small right effusion has  developed in addition to a small amount of ascites.  Findings are suspicious for diverticulitis at the hepatic flexure.  No  abscess or obvious perforation demonstrated.  The nodularity seen at the right base on the prior study has almost  completely resolved.  New small nodules are seen in the right middle  lobe and left lower lobe.  These are most likely inflammatory given  the relatively new appearance of these and the disappearance of other  nodules.  Continued follow-up is recommended..  Signed by Cheko Wise MD    Assessment  Bing ZARAGOZA Mg is a 63 y.o. female with a PMH of SLE, lupus  cerebritis, RA, Raynaud's syndrome, hyperparathyroidism, adrenal insufficiency, COPD, DM, HTN, HLD, atrial fibrillation (not on anticoagulation), CKD, pacemaker, seizures, psychosis who presented with concerns for shock 2/2 adrenal insufficiency + UA (prior positive urine culture) and in whom we are consulted for diverticulitis.      Pt with no further signs of GIB though continues to have epistaxis. As a result, no current Corepak. Pt remains very poor candidate for PEG as detailed in 7/17 note. Hgb 7.5 today    # Septic shock 2/2 diverticulitis, shock resolved  # LGIB, BRBPR  # Epistaxis, likely traumatic  # Acute blood loss anemia  # Recurrent secondary AI    Plan:  - continue supportive care  - continue to monitor for overt signs of bleeding  - no NSAIDS  - continue antibiotics per primary team/ID recs  - if persistent and uncontrolled BRBPR, will consider emergent GI intervention, though remains at high risk for procedural complications to include perforation given current diverticulitis.   - continue conservative management and PRBC / PLT transfusions as indicated  - risks vs benefits when considering CTA for active bleeding given renal failure  - remains poor PEG candidate as detailed in note from 7/17  - cont. GOC and hospice discussions with patient and family     Thank you for allowing us to participate in care. Please call with any further questions or concerns.      Plan has been discussed with Dr. Hobbs. GI will sign off.      JIM Ding/CNP

## 2024-07-19 NOTE — PROGRESS NOTES
Nutrition Follow Up Assessment:   Nutrition Assessment    Reason for Assessment: Provider consult order    Patient History: Bing Holliday is a 63 y.o. female presenting to ED 7/15 and admitted to ICU due to hypotension, hypoglycemia, and hypothermia. CT showing small right effusion, small ascites, and diverticulitis.  GI consulted to evaluate noting uncomplicated diverticulitis and recommending atb and outpatient colonoscopy pending GOC.     Most recent admit here was from 6/28-7/9 for AMS and abnormal labs with multifactorial sepsis and adrenal insufficiency.  She has been admitted every month since March 2024.  She has been to a number of nursing facilities and prior to last admit she was at [facility] and continues to get SNF care there.    7/18/24 Follow up note -  Chart reviewed and events noted.  Since last review HPOA had agreed to placement of corpak.  Placement was attempted on 7/16 with refusal from patient.  Pt had otolaryngology consult after corpak did get placed followed by nose bleed and removal of corpak.  Bright red bms noted yesterday per nursing notation.  GI recommends CTA but patient JED too severe at this time. Pt off pressor support since last review. Per GI notation there is no clear benefit to pursing PEG placement at this time, see note on 7/17 for details.      7/19/2024 Follow up: Chart reviewed and events noted. CCM considering PPN if pt does not pass ST eval; passed for puree level 4 with thin liquids and 1:1 feeding. ST suggesting puree as pt currently dependent on HF via VM due to recurrent epistaxis over night and 7/19 AM and unable to place nasal cannula. Per CCM to discontinue central line today; considering midline if unable to obtain peripheral access (note midline is NOT a central line).      Past Medical History: SLE, lupus cerebritis, RA, Raynaud's syndrome, hyperparathyroidism, adrenal insufficiency, COPD, DM, HTN, HLD, atrial fibrillation (not on  "anticoagulation), CKD, pacemaker, seizures, psychosis     Nutrition History:  Energy Intake:  (NPO)  Food and Nutrient History: 7/16: Pt unable to provide information so far this admit.  No family present.  Pt had been fed by corpak last admission however this was removed as she was tolerating a PO diet.  Diet at [facility] was LCS, regular texture per orders.  Spoke with nurse from [facility] who noted that pt has not eaten much whatsoever since the day she was admitted and often holds meds in her mouth for 15-20min before swallowing and overall has difficulty swallowing with meds and food.  Vitamin/Herbal Supplement Use: home meds include folic acid, lasix, cortef, lantus U100, humalog, melatonin, cellcept  Food Allergies/Intolerances:  None  GI Symptoms: Abdominal pain and bloody stools  Oral Problems: Chewing difficulty and Swallowing difficulty  ST passed pt for puree/thin liquids.        Current Diet: Adult diet Regular; Pureed 4; Thin 0; 1:1 Feeding    Anthropometrics:  Height: 165.1 cm (5' 5\")   Weight: 97.8 kg (215 lb 9.8 oz)   BMI (Calculated): 35.88             Weight Change %:  Weight History / % Weight Change: Weight history shows fluctuation probably in relation to edema and variable PO intakes. Last 6 months wt range has been 190-224 lbs per chart.  Significant Weight Loss: Yes  Interpretation of Weight Loss: >5% in 1 month    Pain Assessment: 0-10  0-10 (Numeric) Pain Score: 0 - No pain      Nutrition Significant Labs:  BMP Trend:   Results from last 7 days   Lab Units 07/19/24  0403 07/18/24  2216 07/18/24  0416 07/17/24  1430   GLUCOSE mg/dL 132* 126* 127* 146*   CALCIUM mg/dL 7.7* 7.8* 7.5* 7.7*   SODIUM mmol/L 146* 147* 145 141   POTASSIUM mmol/L 4.0 3.7 4.0 4.2   CO2 mmol/L 22 22 21 21   CHLORIDE mmol/L 114* 114* 113* 110*   BUN mg/dL 31* 30* 31* 31*   CREATININE mg/dL 2.34* 2.37* 2.48* 2.62*    , A1C:  Lab Results   Component Value Date    HGBA1C 6.8 (H) 06/04/2024       Nutrition Specific " Medications:  Scheduled medications  [Held by provider] atorvastatin, 40 mg, oral, Nightly  budesonide, 0.5 mg, nebulization, BID  [Held by provider] docusate sodium, 100 mg, oral, BID  [Held by provider] folic acid, 1 mg, nasogastric tube, Daily  formoterol, 20 mcg, nebulization, BID  [Held by provider] heparin (porcine), 5,000 Units, subcutaneous, q8h  hydrocortisone sodium succinate, 25 mg, intravenous, q8h  insulin lispro, 0-5 Units, subcutaneous, q4h  ipratropium-albuteroL, 3 mL, nebulization, TID  levETIRAcetam, 500 mg, intravenous, q12h  [Held by provider] melatonin, 5 mg, oral, Nightly  [Held by provider] mycophenolate, 1,000 mg, oral, BID  pantoprazole, 40 mg, intravenous, Daily  piperacillin-tazobactam, 3.375 g, intravenous, q8h  [Held by provider] polyethylene glycol, 17 g, oral, Daily  [Held by provider] risperiDONE, 3 mg, nasogastric tube, BID  [Held by provider] sertraline, 25 mg, nasogastric tube, Nightly  [Held by provider] sodium chloride, 2 spray, Each Nostril, TID  [Held by provider] thiamine, 100 mg, nasogastric tube, Daily      Nutrition Focused Physical Exam Findings:  defer: below from initial assessment  Subcutaneous Fat Loss:   Orbital Fat Pads: Mild-Moderate (slight dark circles and slight hollowing)  Buccal Fat Pads: Mild-Moderate (flat cheeks, minimal bounce)  Triceps: Defer  Muscle Wasting:  Temporalis: Mild-Moderate (slight depression)  Pectoralis (Clavicular Region): Mild-Moderate (some protrusion of clavicle)  Deltoid/Trapezius: Mild-Moderate (slight protrusion of acromion process)  Interosseous: Defer  Trapezius/Infraspinatus/Supraspinatus (Scapular Region): Defer  Quadriceps: Defer  Gastrocnemius: Defer  Edema:  Edema: +1 trace  Edema Location: 1+ BLE  Physical Findings:  Hair: Negative  Eyes: Negative  Mouth: Negative  Nails: Negative  Skin: Negative    I/O:   Last BM Date: 07/19/24; Stool Appearance: Loose (07/19/24 0800)     Estimated Needs:   Total Energy Estimated Needs (kCal):   (2002 kcal (MSJ x 1.1 x 1.2) or 22.8 kcal/kg)  Total Protein Estimated Needs (g):  (88-105g protein (1.0-1.2 g/kg))  Total Fluid Estimated Needs (mL):  (1mL/kcal/d or as per physician)          Nutrition Diagnosis   Malnutrition Diagnosis  Patient has Malnutrition Diagnosis: Yes  Diagnosis Status: Ongoing  Malnutrition Diagnosis: Moderate malnutrition related to chronic disease or condition  As Evidenced by: acute on chronic nutritional stress with indicators including NFPE showing moderate muscle wasting/fat loss, unintentional weight loss, and intakes meeting <75% of needs for > 1 month.  Additional Assessment Information: 7/19: Case discussed during CCM rounds. Pt more awake today. Per CCM, GI indicated pt not candidate for PEG; TPN not warranted as pt with functioning gut. CCM would consider short term PPN until able to place NG/dobbhoff access (CCM aware dobbhoff/NGT not long term access); pt since passed ST 7/19 for puree level 4/thin liquids with 1:1 feeds.            Nutrition Interventions/Recommendations   Nutrition Prescription:     DIET: regular/puree level 4 with thin liquids (1:1 feeds as per ST). Encourage >75% of meals as tolerated. Note NO CHO restriction as pt tends to be hypoglycemic.   SUPPLEMENT: Glucerna 3x/day and PRN for additional 660kcal and 30g pro/day; add Magic Cup 2x/d and PRN for additional 580kcal and 18g pro/day. Encourage >75% of ONS.  ENTERAL NUTRITION: If oral intakes <75% of meals and ONS and able to place dobbhoff, suggest Osmolite 1.5 at 10ml/hr advancing to a goal rate of 55ml/hr to provide 1980kcal, 83g pro, and 1003ml; suggest water flush of 165ml 6x/day to provide total fluid volume of 1993ml   PARENTERAL NUTRITION: If oral intakes <75% of meals and ONS and unable to place dobbhoff, suggest peripheral Clinimix (4.25%/5%) at 83mL/hr for 677kcal, 85g pro, and 1992mL volume. Please add MVI/minerals to PPN bag. PN reason: malnutrition and/or prolonged NPO status.            Nutrition Monitoring and Evaluation   Food/Nutrient Related History Monitoring  Monitoring and Evaluation Plan: Energy intake  Energy Intake: Estimated energy intake  Criteria: >75% of estimated nutrient needs via PO  Enteral and Parenteral Nutrition Intake: Parenteral nutrition formula/solution, Parenteral nutrition intake  Criteria: initiate PPN if unable to tolerate oral diet within next 24-48 hours.         Biochemical Data, Medical Tests and Procedures  Monitoring and Evaluation Plan: Electrolyte/renal panel, Glucose/endocrine profile  Electrolyte and Renal Panel: Magnesium, Phosphorus, Potassium, Sodium  Criteria: lytes WNR  Glucose/Endocrine Profile: Glucose, casual, Glucose, fasting  Criteria: BG 70-180mg/dL or as per Endocrinology            Time Spent/Follow-up Reminder:   Time Spent (min): 45 minutes  Last Date of Nutrition Visit: 07/19/24  Nutrition Follow-Up Needed?: 3-5 days  Follow up Comment: ks, added PPN recs, passed ST

## 2024-07-20 LAB
ABO GROUP (TYPE) IN BLOOD: NORMAL
ALBUMIN SERPL BCP-MCNC: 2.6 G/DL (ref 3.4–5)
ALP SERPL-CCNC: 95 U/L (ref 33–136)
ALT SERPL W P-5'-P-CCNC: 12 U/L (ref 7–45)
ANION GAP SERPL CALC-SCNC: 13 MMOL/L (ref 10–20)
ANTIBODY SCREEN: NORMAL
AST SERPL W P-5'-P-CCNC: 12 U/L (ref 9–39)
BASO STIPL BLD QL SMEAR: PRESENT
BASOPHILS # BLD MANUAL: 0 X10*3/UL (ref 0–0.1)
BASOPHILS NFR BLD MANUAL: 0 %
BILIRUB SERPL-MCNC: 0.8 MG/DL (ref 0–1.2)
BUN SERPL-MCNC: 32 MG/DL (ref 6–23)
CALCIUM SERPL-MCNC: 7.7 MG/DL (ref 8.6–10.3)
CHLORIDE SERPL-SCNC: 114 MMOL/L (ref 98–107)
CO2 SERPL-SCNC: 21 MMOL/L (ref 21–32)
CREAT SERPL-MCNC: 2.01 MG/DL (ref 0.5–1.05)
EGFRCR SERPLBLD CKD-EPI 2021: 28 ML/MIN/1.73M*2
EOSINOPHIL # BLD MANUAL: 0 X10*3/UL (ref 0–0.7)
EOSINOPHIL NFR BLD MANUAL: 0 %
ERYTHROCYTE [DISTWIDTH] IN BLOOD BY AUTOMATED COUNT: 20.9 % (ref 11.5–14.5)
GLUCOSE BLD MANUAL STRIP-MCNC: 166 MG/DL (ref 74–99)
GLUCOSE BLD MANUAL STRIP-MCNC: 175 MG/DL (ref 74–99)
GLUCOSE BLD MANUAL STRIP-MCNC: 180 MG/DL (ref 74–99)
GLUCOSE BLD MANUAL STRIP-MCNC: 182 MG/DL (ref 74–99)
GLUCOSE BLD MANUAL STRIP-MCNC: 182 MG/DL (ref 74–99)
GLUCOSE BLD MANUAL STRIP-MCNC: 185 MG/DL (ref 74–99)
GLUCOSE BLD MANUAL STRIP-MCNC: 222 MG/DL (ref 74–99)
GLUCOSE SERPL-MCNC: 188 MG/DL (ref 74–99)
HCT VFR BLD AUTO: 23.9 % (ref 36–46)
HGB BLD-MCNC: 7.4 G/DL (ref 12–16)
IMM GRANULOCYTES # BLD AUTO: 0.93 X10*3/UL (ref 0–0.7)
IMM GRANULOCYTES NFR BLD AUTO: 11.7 % (ref 0–0.9)
LYMPHOCYTES # BLD MANUAL: 1.34 X10*3/UL (ref 1.2–4.8)
LYMPHOCYTES NFR BLD MANUAL: 17 %
MAGNESIUM SERPL-MCNC: 2.06 MG/DL (ref 1.6–2.4)
MCH RBC QN AUTO: 29.2 PG (ref 26–34)
MCHC RBC AUTO-ENTMCNC: 31 G/DL (ref 32–36)
MCV RBC AUTO: 95 FL (ref 80–100)
METAMYELOCYTES # BLD MANUAL: 0.4 X10*3/UL
METAMYELOCYTES NFR BLD MANUAL: 5 %
MONOCYTES # BLD MANUAL: 0.47 X10*3/UL (ref 0.1–1)
MONOCYTES NFR BLD MANUAL: 6 %
MYELOCYTES # BLD MANUAL: 0.24 X10*3/UL
MYELOCYTES NFR BLD MANUAL: 3 %
NEUTROPHILS # BLD MANUAL: 5.45 X10*3/UL (ref 1.2–7.7)
NEUTS BAND # BLD MANUAL: 0.47 X10*3/UL (ref 0–0.7)
NEUTS BAND NFR BLD MANUAL: 6 %
NEUTS SEG # BLD MANUAL: 4.98 X10*3/UL (ref 1.2–7)
NEUTS SEG NFR BLD MANUAL: 63 %
NRBC BLD-RTO: 3.3 /100 WBCS (ref 0–0)
PHOSPHATE SERPL-MCNC: 2.2 MG/DL (ref 2.5–4.9)
PLATELET # BLD AUTO: 62 X10*3/UL (ref 150–450)
POLYCHROMASIA BLD QL SMEAR: NORMAL
POTASSIUM SERPL-SCNC: 3.6 MMOL/L (ref 3.5–5.3)
PROT SERPL-MCNC: 4.7 G/DL (ref 6.4–8.2)
RBC # BLD AUTO: 2.53 X10*6/UL (ref 4–5.2)
RBC MORPH BLD: NORMAL
RH FACTOR (ANTIGEN D): NORMAL
SODIUM SERPL-SCNC: 144 MMOL/L (ref 136–145)
TOTAL CELLS COUNTED BLD: 100
WBC # BLD AUTO: 7.9 X10*3/UL (ref 4.4–11.3)

## 2024-07-20 PROCEDURE — 2060000001 HC INTERMEDIATE ICU ROOM DAILY

## 2024-07-20 PROCEDURE — 2500000004 HC RX 250 GENERAL PHARMACY W/ HCPCS (ALT 636 FOR OP/ED): Performed by: INTERNAL MEDICINE

## 2024-07-20 PROCEDURE — 2500000004 HC RX 250 GENERAL PHARMACY W/ HCPCS (ALT 636 FOR OP/ED)

## 2024-07-20 PROCEDURE — 82947 ASSAY GLUCOSE BLOOD QUANT: CPT

## 2024-07-20 PROCEDURE — 85007 BL SMEAR W/DIFF WBC COUNT: CPT

## 2024-07-20 PROCEDURE — 2500000001 HC RX 250 WO HCPCS SELF ADMINISTERED DRUGS (ALT 637 FOR MEDICARE OP)

## 2024-07-20 PROCEDURE — 80053 COMPREHEN METABOLIC PANEL: CPT

## 2024-07-20 PROCEDURE — 83735 ASSAY OF MAGNESIUM: CPT

## 2024-07-20 PROCEDURE — 85027 COMPLETE CBC AUTOMATED: CPT

## 2024-07-20 PROCEDURE — 94640 AIRWAY INHALATION TREATMENT: CPT

## 2024-07-20 PROCEDURE — 94660 CPAP INITIATION&MGMT: CPT

## 2024-07-20 PROCEDURE — 2500000002 HC RX 250 W HCPCS SELF ADMINISTERED DRUGS (ALT 637 FOR MEDICARE OP, ALT 636 FOR OP/ED)

## 2024-07-20 PROCEDURE — C9113 INJ PANTOPRAZOLE SODIUM, VIA: HCPCS

## 2024-07-20 PROCEDURE — 99233 SBSQ HOSP IP/OBS HIGH 50: CPT

## 2024-07-20 PROCEDURE — 36430 TRANSFUSION BLD/BLD COMPNT: CPT

## 2024-07-20 PROCEDURE — 86901 BLOOD TYPING SEROLOGIC RH(D): CPT

## 2024-07-20 PROCEDURE — 2500000002 HC RX 250 W HCPCS SELF ADMINISTERED DRUGS (ALT 637 FOR MEDICARE OP, ALT 636 FOR OP/ED): Performed by: INTERNAL MEDICINE

## 2024-07-20 PROCEDURE — 99233 SBSQ HOSP IP/OBS HIGH 50: CPT | Performed by: OTOLARYNGOLOGY

## 2024-07-20 PROCEDURE — 84100 ASSAY OF PHOSPHORUS: CPT

## 2024-07-20 PROCEDURE — 36415 COLL VENOUS BLD VENIPUNCTURE: CPT

## 2024-07-20 PROCEDURE — P9035 PLATELET PHERES LEUKOREDUCED: HCPCS

## 2024-07-20 PROCEDURE — 2500000005 HC RX 250 GENERAL PHARMACY W/O HCPCS: Performed by: INTERNAL MEDICINE

## 2024-07-20 RX ORDER — RISPERIDONE 1 MG/1
3 TABLET ORAL 2 TIMES DAILY
Status: DISCONTINUED | OUTPATIENT
Start: 2024-07-20 | End: 2024-07-30 | Stop reason: HOSPADM

## 2024-07-20 RX ORDER — FUROSEMIDE 10 MG/ML
20 INJECTION INTRAMUSCULAR; INTRAVENOUS ONCE
Status: COMPLETED | OUTPATIENT
Start: 2024-07-20 | End: 2024-07-20

## 2024-07-20 ASSESSMENT — PAIN - FUNCTIONAL ASSESSMENT
PAIN_FUNCTIONAL_ASSESSMENT: 0-10

## 2024-07-20 ASSESSMENT — PAIN SCALES - GENERAL
PAINLEVEL_OUTOF10: 0 - NO PAIN

## 2024-07-20 ASSESSMENT — COGNITIVE AND FUNCTIONAL STATUS - GENERAL
HELP NEEDED FOR BATHING: TOTAL
TOILETING: TOTAL
CLIMB 3 TO 5 STEPS WITH RAILING: TOTAL
DRESSING REGULAR UPPER BODY CLOTHING: TOTAL
DAILY ACTIVITIY SCORE: 6
EATING MEALS: TOTAL
DRESSING REGULAR LOWER BODY CLOTHING: TOTAL
PERSONAL GROOMING: TOTAL
STANDING UP FROM CHAIR USING ARMS: TOTAL
WALKING IN HOSPITAL ROOM: TOTAL
MOVING TO AND FROM BED TO CHAIR: A LOT
MOVING FROM LYING ON BACK TO SITTING ON SIDE OF FLAT BED WITH BEDRAILS: A LOT
TURNING FROM BACK TO SIDE WHILE IN FLAT BAD: A LOT
MOBILITY SCORE: 9

## 2024-07-20 NOTE — PROGRESS NOTES
INPATIENT NEPHROLOGY CONSULT PROGRESS NOTE      Patient Name: Bing Holliday MRN: [id number]  DATE of SERVICE: July 20, 2024  TIME of SERVICE: 3:09 PM  CONSULTING SERVICE: Nephrology    REASON for CONSULT: JED    SUBJECTIVE:  Patient seen and evaluated at bedside, nasal packing in place, epistaxis controlled.  Was able to tolerate Ensure.  Scheduled to receive platelet transfusions and 20 mg of IV Lasix.  Sodium level stable at 144 after D5 water.  Serum creatinine plateauing at 2.01 milligram per deciliter, EGFR 28.  No uremic symptoms.  Hypophosphatemia likely due to malnutrition.     SUMMARY OF STAY:  Ms. Holliday is a 63 y.o. female who presented to [facility] 15, 2024 for evaluation of weakness noted at skilled nursing facility.  Diagnosed with septic shock, acute diverticulitis, acute diverticular bleed and admitted to the intensive care unit.  Patient with complex past medical history including lupus nephritis class V in 2011, systemic lupus erythematosus with chronic kidney disease stage IIIb,  Cerebritis and arthropathy, uncontrolled type 2 diabetes mellitus, paroxysmal atrial fibrillation, heart failure with reduced ejection fraction, coronary artery disease status post CABG 2013, COPD, pulmonary hypertension, hypertension, hyperlipidemia, GERD.    ASSESSMENT:  Acute kidney injury superimposed on chronic kidney disease stage IIIb:  -- Acute kidney injury likely ischemic related ATN in the setting of septic shock and acute GI bleed, peak serum creatinine up to 2.6 mg deciliter with drop in GFR to 20 mill per minute per 1.73 m²  --From baseline serum creatinine of 1.5 and EGFR of 40 mill per minute per 1.73 m²  --Immunocompromised host with pancytopenia  --Acute diverticulitis with diverticular bleed, s/p blood transfusion.   --Montano catheter in place no signs of obstruction  --On background CKD 3B, lupus nephritis class V  --Code status  DNR/DNI no dialysis   --Patient reports that her mother passed During hemodialysis session and she does not wish to be on hemodialysis.     Septic shock secondary to acute diverticulitis:   -- Was hypothermic on presentation  -- Shock resolved, off pressor support     Acute hypoxic respiratory failure requiring intermittent Venturi mask     Volume: Tendency to third space due to hypoalbuminemia, CKD, diabetes patient with underlying abdominal situs.  -- Chest x-ray with findings concerning for pulmonary edema  -- Intermittent volume expansion with oncotic solution to maintain hemodynamic stability.     Acute blood loss anemia, GI team following patient at high risk to have procedure.     Immunocompromised     Systemic lupus erythematosus managed with mycophenolate and prednisone.     Adrenal insufficiency: On hydrocortisone 50 mg 3 times daily.     Pancytopenia     Pulmonary hypertension     Paroxysmal atrial fibrillation, rate controlled       PLAN:  Acute kidney injury superimposed on chronic kidney disease stage IIIb, acute kidney injury multifactorial component of ischemic related ATN, sepsis related ATN.  Currently with mild volume overload/venous congestion Post resuscitation improving, pt auto-diuresing.      To discontinue D5W as pt tolerating liquid oral intake.   To start IV lasix as tolerated.   Scheduled to receive platelet transfusion.   To cont protein supplements 3 times daily.  Strict input and output to guide volume management.     Will follow, thank you!    Medications:    Current Facility-Administered Medications:     acetaminophen (Tylenol) tablet 650 mg, 650 mg, oral, q4h PRN, ASIM Moreno    atorvastatin (Lipitor) tablet 40 mg, 40 mg, oral, Nightly, JIM Moreno-CNP, 40 mg at 07/19/24 2151    budesonide (Pulmicort) 0.5 mg/2 mL nebulizer solution 0.5 mg, 0.5 mg, nebulization, BID, Krista Lambert MD, 0.5 mg at 07/20/24 0846    dextrose 50 % injection 12.5 g, 12.5 g,  intravenous, q15 min PRN, Jaziel Tyler DO    dextrose 50 % injection 25 g, 25 g, intravenous, q15 min PRN, Jaziel Tyler DO    [Held by provider] docusate sodium (Colace) capsule 100 mg, 100 mg, oral, BID, Jaziel Tyler DO    folic acid (Folvite) tablet 1 mg, 1 mg, oral, Daily, Kinza Vila, APRN-CNP, 1 mg at 07/20/24 0849    formoterol (Perforomist) 20 mcg/2 mL nebulizer solution 20 mcg, 20 mcg, nebulization, BID, Krista Lambert MD, 20 mcg at 07/20/24 0846    glucagon (Glucagen) injection 1 mg, 1 mg, intramuscular, q15 min PRN, Jaziel Tyler DO    glucagon (Glucagen) injection 1 mg, 1 mg, intramuscular, q15 min PRN, Jaziel Tyler DO    [Held by provider] heparin (porcine) injection 5,000 Units, 5,000 Units, subcutaneous, q8h, Jaziel Tyler DO, 5,000 Units at 07/16/24 2349    hydrocortisone sod succ (PF) (Solu-CORTEF) injection 25 mg, 25 mg, intravenous, q8h, Avi ERIC Sloan, DO, 25 mg at 07/20/24 1139    insulin lispro (HumaLOG) injection 0-5 Units, 0-5 Units, subcutaneous, q4h, Jaziel Tyler DO, 1 Units at 07/17/24 0824    ipratropium-albuteroL (Duo-Neb) 0.5-2.5 mg/3 mL nebulizer solution 3 mL, 3 mL, nebulization, q4h PRN, Jaziel Tyler DO    ipratropium-albuteroL (Duo-Neb) 0.5-2.5 mg/3 mL nebulizer solution 3 mL, 3 mL, nebulization, TID, Krista Lambert MD, 3 mL at 07/20/24 1328    levETIRAcetam (Keppra) 500 mg in sodium chloride (iso)  mL, 500 mg, intravenous, q12h, Jaziel Tyler DO, Stopped at 07/20/24 0402    magnesium sulfate 2 g in sterile water for injection 50 mL, 2 g, intravenous, q6h PRN, Donald Busby MD, Stopped at 07/18/24 0800    [Held by provider] melatonin tablet 5 mg, 5 mg, oral, Nightly, Jaziel Tyler DO    [Held by provider] mycophenolate (Cellcept) capsule 1,000 mg, 1,000 mg, oral, BID, Jaziel Tyler DO    oxygen (O2) therapy, , inhalation, Continuous PRN - O2/gases, Krista Lambert MD, 30 percent at 07/20/24 0850    oxymetazoline (Afrin) 0.05 % nasal spray 2 spray, 2 spray, Each Nostril, q12h  PRN, Jaziel Tyler DO    pantoprazole (ProtoNix) injection 40 mg, 40 mg, intravenous, Daily, Shalom Sage MD, 40 mg at 07/20/24 0849    piperacillin-tazobactam (Zosyn) 3.375 g in dextrose (iso) IV 50 mL, 3.375 g, intravenous, q8h, Jaziel Tyler DO, Last Rate: 12.5 mL/hr at 07/20/24 1139, 3.375 g at 07/20/24 1139    polyethylene glycol (Glycolax, Miralax) packet 17 g, 17 g, oral, Daily, Kinza Vila, APRN-CNP    potassium chloride 40 mEq in sterile water for injection 100 mL, 40 mEq, intravenous, q6h PRN, Donald Busby MD    risperiDONE (RisperDAL) tablet 3 mg, 3 mg, oral, BID, Kinza C Trefny, APRN-CNP, 3 mg at 07/20/24 1140    sertraline (Zoloft) tablet 25 mg, 25 mg, oral, Nightly, Kinza Carterfny, APRN-CNP, 25 mg at 07/19/24 2151    [Held by provider] sodium chloride (Ocean) 0.65 % nasal spray 2 spray, 2 spray, Each Nostril, TID, Evaristo Rolle DO    thiamine (Vitamin B-1) tablet 100 mg, 100 mg, oral, Daily, Kinza STILL Trefny, APRN-CNP, 100 mg at 07/20/24 0849    PERTINENT ROS:  GENERAL:  positive for fatigue, poor appetite.  No fever/chills  RESPIRATORY:  positive for shortness of breath.  Negative for cough, wheezing.  CARDIOVASCULAR:   Negative for chest pain or palpitation.  GI:  Negative for abdominal pain, diarrhea, heartburn, nausea, vomiting  : + ruano     Physical Exam:  Vital signs in last 24 hours:  Temp:  [36.2 °C (97.2 °F)-36.9 °C (98.4 °F)] 36.9 °C (98.4 °F)  Heart Rate:  [] 82  Resp:  [14-36] 18  BP: (113-151)/(60-86) 127/78  FiO2 (%):  [30 %] 30 %    General: Awake, on venturi mask  HEENT:  + nasal packing   NECK:  no  JVD, no carotid bruit, supple, no cervical mass or thyromegaly  LUNGS;  Diminished breath sounds, no Rales  CV:  Distant, regular rate and rhythm, no murmurs  ABDOMEN:  abdomen soft, nontender, BS normal, no masses or organomegaly  EDEMA:  no lower extremity edema/dependent edema  SKIN:  dry and normal turgor, no clubbing, cyanosis or petechia.  No rashes noted  : +  ruano     Intake/Output last 3 shifts:  I/O last 3 completed shifts:  In: 1905.8 (19.4 mL/kg) [P.O.:300; I.V.:1155.8 (11.8 mL/kg); IV Piggyback:450]  Out: 1825 (18.6 mL/kg) [Urine:1825 (0.5 mL/kg/hr)]  Weight: 98.1 kg     DATA:  Diagnotic tests reviewed for Todays visit:  Results from last 7 days   Lab Units 07/20/24  0338   WBC AUTO x10*3/uL 7.9   RBC AUTO x10*6/uL 2.53*   HEMOGLOBIN g/dL 7.4*   HEMATOCRIT % 23.9*     Results from last 7 days   Lab Units 07/20/24  0339   SODIUM mmol/L 144   POTASSIUM mmol/L 3.6   CHLORIDE mmol/L 114*   CO2 mmol/L 21   BUN mg/dL 32*   CREATININE mg/dL 2.01*   CALCIUM mg/dL 7.7*   PHOSPHORUS mg/dL 2.2*   MAGNESIUM mg/dL 2.06   BILIRUBIN TOTAL mg/dL 0.8   ALT U/L 12   AST U/L 12     Results from last 7 days   Lab Units 07/15/24  1245   COLOR U  Yellow   APPEARANCE U  Clear   PH U  5.0   SPEC GRAV UR  1.014   PROTEIN U mg/dL 30 (1+)*   BLOOD UR  NEGATIVE   NITRITE U  NEGATIVE   WBC UR /HPF 11-20*     IMAGING: CXR reviewed in  images      SIGNATURE: Vikram Perez MD  Nephrology and Hypertension  [address] [place]  Office phone: [phone]  FAX: [phone]    This note was partially generated using the Dragon voice recognition system, and there may be some incorrect words, spelling's and punctuation that were not noted in checking the note before saving.

## 2024-07-20 NOTE — PROGRESS NOTES
"    Nancy Smart is sitting upright in her bed with a face shield in place.  She has a Rhino Rocket in the right nostril and a Merocel sponge in the left nostril.  Old blood in place.  No evidence of any active bleeding.  The patient is somewhat sedated this morning.  Blood pressure has been well-controlled.  Platelets are still low.       Objective     Physical Exam  Nose-Rhino Rocket in right nostril-stable, 8 cm Merocel sponge in the left nostril-slightly blood-tinged.  No evidence of any bleeding around the packing.  Oral cavity-mucosa mildly dry.  No evidence of any posterior clots along the retropharyngeal wall.  No posterior bleeding.            Last Recorded Vitals  Blood pressure 138/76, pulse 82, temperature 36.8 °C (98.2 °F), temperature source Temporal, resp. rate 16, height 1.651 m (5' 5\"), weight 98.1 kg (216 lb 4.3 oz), SpO2 93%.  Intake/Output last 3 Shifts:  I/O last 3 completed shifts:  In: 1905.8 (19.4 mL/kg) [P.O.:300; I.V.:1155.8 (11.8 mL/kg); IV Piggyback:450]  Out: 1825 (18.6 mL/kg) [Urine:1825 (0.5 mL/kg/hr)]  Weight: 98.1 kg     Relevant Results        Scheduled medications  atorvastatin, 40 mg, oral, Nightly  budesonide, 0.5 mg, nebulization, BID  [Held by provider] docusate sodium, 100 mg, oral, BID  folic acid, 1 mg, oral, Daily  formoterol, 20 mcg, nebulization, BID  [Held by provider] heparin (porcine), 5,000 Units, subcutaneous, q8h  hydrocortisone sodium succinate, 25 mg, intravenous, q8h  insulin lispro, 0-5 Units, subcutaneous, q4h  ipratropium-albuteroL, 3 mL, nebulization, TID  levETIRAcetam, 500 mg, intravenous, q12h  [Held by provider] melatonin, 5 mg, oral, Nightly  [Held by provider] mycophenolate, 1,000 mg, oral, BID  pantoprazole, 40 mg, intravenous, Daily  piperacillin-tazobactam, 3.375 g, intravenous, q8h  polyethylene glycol, 17 g, oral, Daily  risperiDONE, 3 mg, oral, BID  sertraline, 25 mg, oral, Nightly  [Held by provider] sodium chloride, 2 spray, Each " Nostril, TID  thiamine, 100 mg, oral, Daily      Continuous medications  dextrose 5%, 50 mL/hr, Last Rate: 50 mL/hr (07/20/24 0700)      PRN medications  PRN medications: acetaminophen, dextrose, dextrose, glucagon, glucagon, ipratropium-albuteroL, magnesium sulfate, oxygen, oxymetazoline, potassium chloride                 Assessment/Plan   Principal Problem:    Hypothermia, initial encounter    Epistaxis-currently stable without evidence of persistent bleeding.  4.5 cm Rhino Rocket in the right nostril with 8 cm nasal Merocel sponge in the left nostril.  No evidence of any posterior bleeding at the present time.  Anemia-stable  Pancytopenia with low platelets    Recommendation:  Humidity from face shield will cause the nasal sponge to ooze slightly.  Okay to push on the left nostril and release some of the blood absorbed by the Merocel sponge and okay to apply Afrin if needed.  Will maintain packing over the next few days.  Consider replacing platelets to help with coagulation.       I spent 30 minutes in the professional and overall care of this patient.    Will continue to follow along.  Evaristo Rolle, DO

## 2024-07-20 NOTE — PROGRESS NOTES
Critical Care Daily Progress        Subjective   Patient is a 63 y.o. female admitted on 7/15/2024 10:13 AM to the ICU for shock secondary to adrenal sufficiency and/or sepsis.    Overnight Events:   No events overnight. She passed her swallow study, and oral meds and feeding reinitiated.    Patient seen and evaluated this morning. She feels better this morning. She denies any chest pain or shortness of breath. ENT at bedside to reevaluate packing and rhino rocket for epistaxis.     Scheduled Medications:   atorvastatin, 40 mg, oral, Nightly  budesonide, 0.5 mg, nebulization, BID  [Held by provider] docusate sodium, 100 mg, oral, BID  folic acid, 1 mg, oral, Daily  formoterol, 20 mcg, nebulization, BID  [Held by provider] heparin (porcine), 5,000 Units, subcutaneous, q8h  hydrocortisone sodium succinate, 25 mg, intravenous, q8h  insulin lispro, 0-5 Units, subcutaneous, q4h  ipratropium-albuteroL, 3 mL, nebulization, TID  levETIRAcetam, 500 mg, intravenous, q12h  [Held by provider] melatonin, 5 mg, oral, Nightly  [Held by provider] mycophenolate, 1,000 mg, oral, BID  pantoprazole, 40 mg, intravenous, Daily  piperacillin-tazobactam, 3.375 g, intravenous, q8h  polyethylene glycol, 17 g, oral, Daily  risperiDONE, 3 mg, oral, BID  sertraline, 25 mg, oral, Nightly  [Held by provider] sodium chloride, 2 spray, Each Nostril, TID  thiamine, 100 mg, oral, Daily         Continuous Medications:             PRN Medications:   PRN medications: acetaminophen, dextrose, dextrose, glucagon, glucagon, ipratropium-albuteroL, magnesium sulfate, oxygen, oxymetazoline, potassium chloride    Objective   Vitals:  Temp  Min: 35.8 °C (96.4 °F)  Max: 36.9 °C (98.4 °F)  Pulse  Min: 66  Max: 102  BP  Min: 113/60  Max: 150/86  Resp  Min: 14  Max: 36  SpO2  Min: 87 %  Max: 98 %    Physical Exam:   Physical Exam   Physical Exam  Vitals and nursing note reviewed.   Constitutional:       Appearance: She is well-developed. She is ill-appearing.    HENT:      Head: Normocephalic and atraumatic.      Right Ear: External ear normal.      Left Ear: External ear normal.      Nose:      Comments: Merocel in left nostril, gauze in right nostril.     Mouth/Throat:      Mouth: Mucous membranes are dry.   Eyes:      General: No scleral icterus.     Extraocular Movements: Extraocular movements intact.      Conjunctiva/sclera: Conjunctivae normal.      Pupils: Pupils are equal, round, and reactive to light.   Cardiovascular:      Rate and Rhythm: Normal rate and regular rhythm.      Heart sounds: No murmur heard.  Pulmonary:      Effort: Pulmonary effort is normal. No respiratory distress.      Breath sounds: Normal breath sounds.   Abdominal:      Palpations: Abdomen is soft.      Tenderness: There is no abdominal tenderness. There is no guarding or rebound.   Musculoskeletal:         General: No swelling.      Cervical back: Neck supple.   Skin:     General: Skin is warm and dry.      Comments: Old, closed skin wounds on lower legs bilaterally. Do not appear infected.   Neurological:      Mental Status: She is alert. She is disoriented.      Comments: Awake, alert, answers questions. Oriented to person, month, and year. Follows commands.                Assessment/Plan   Overall assessment:  Patient is a 63-year-old female admitted to the ICU for shock, suspect secondary to adrenal insufficiency but sepsis from diverticulitis also within the differential.     Neuro: Encephalopathy secondary to hypoperfusion, sepsis  - History: Lupus cerebritis  - Home meds: Continue home Keppra.  Holding home sertraline, risperidone until mentation improves  - CAM ICU    Cardiovascular: Shock secondary to adrenal insufficiency and/or sepsis  - History: Atrial fibrillation no longer on anticoagulation, pacemaker, hypertension, hyperlipidemia  - Goals: MAP> 65  - Home meds: Resume home atorvastatin  - Last echo: LVEF 40 to 45% (5/2024)  -Norepinephrine drip discontinued  7/16    Pulmonary:   FiO2 (%):  [30 %] 30 %   - History: COPD  - Home meds: Continue DuoNeb and Pulmicort as needed for wheezing  Continuous pulse oximetry   O2 PRN to maintain SpO2 > 94%, wean as tolerated  - Imaging: Chest X-ray 7/18 shows pulmonary edema and minimal-small pleural effusions. Infectious process cannot be ruled out, but clinically less likely to be a pneumonia - already on Zosyn. No fevers or hemodynamic instability.    Gastrointestinal: Diverticulitis at the hepatic flexure   Results from last 7 days   Lab Units 07/20/24  0339 07/19/24  0403 07/18/24  1654 07/18/24  0416 07/17/24  0912 07/16/24  0252 07/15/24  1114   INR   --   --  1.0  --  1.1  --  1.0   ALK PHOS U/L 95 97  --  104  --  177* 134   AST U/L 12 12  --  13  --  20 34   ALT U/L 12 14  --  17  --  25 27   LIPASE U/L  --   --   --   --   --   --  21       - Diet: Pureed diet, passed swallow eval with SLP  - GI on consult for diverticulitis. Continue supportive care. Outpatient colonoscopy in approximately six weeks if appropriate for goals of care. Poor candidate for PEG tube placement at this time.  - GI reconsulted for the GI bleed. Poor candidate for colonoscopy at this time given the inability to complete a bowel prep and active diverticulitis.  - Prophylaxis: Continue home Protonix 40mg daily  - Bowel regimen: None  - Last BM: Last BM Date: 07/20/24    Renal:  #Acute kidney injury on chronic kidney disease, suspect secondary to initial hypoperfusion/hypovolemia/acute illness - improving  #Hypernatremia - resolved  Results from last 7 days   Lab Units 07/20/24  0339 07/19/24  1958 07/19/24  0403 07/18/24  2216 07/18/24  0416   SODIUM mmol/L 144 144 146* 147* 145   POTASSIUM mmol/L 3.6 3.4* 4.0 3.7 4.0   MAGNESIUM mg/dL 2.06  --  2.18 2.25 1.98   PHOSPHORUS mg/dL 2.2* 2.9 3.9 4.0 4.7   BUN mg/dL 32* 32* 31* 30* 31*   CREATININE mg/dL 2.01* 2.05* 2.34* 2.37* 2.48*       Net IO Since Admission: 4,331.79 mL [07/20/24 1610]  - Daily  CMP,Mg,Phos  - History: CKD, baseline creatinine 1.4  - Montano with strict I/O   - FeNa 0.4%, more consistent with pre-renal  - Nephrology on consult for persistent JED  - IV Lasix 20mg once today, ok for pulses of Lasix if needed for fluid overload  - Electrolytes: Replete per protocol, goal K>4 Phos >3 Mg >2    Endocrine: #Adrenal insufficiency  Results from last 7 days   Lab Units 07/20/24  1211 07/20/24  0826 07/20/24  0339 07/20/24  0338 07/20/24  0006 07/19/24 1959 07/19/24 1958 07/19/24  1551 07/15/24  1134 07/15/24  1114   POCT GLUCOSE mg/dL 180* 182*  --  175* 222* 194*  --  161*   < >  --    GLUCOSE mg/dL  --   --  188*  --   --   --  195*  --    < > 49*   TSH mIU/L  --   --   --   --   --   --   --   --   --  4.67*    < > = values in this interval not displayed.      -History: Adrenocortical insufficiency, diabetes  -Home meds: Holding Florinef and hydrocortisone, administering Solu-Cortef  -Endocrinology on consult, Solucortef 25mg q8h and will continue to wean with goal of resuming home dose of Florinef and Cortef  -sliding scale: Insulin lispro  -BG < 180 goal    Rheumatology:  -History: Rheumatoid arthritis  -Holding mycophenolate due to risk of bone marrow suppression  -Discussed w/ patient's rheumatologist, Dr. Hoyos, in [place] on 7/18. Ok to stop hold Cellcept in the setting of acute illness. Limited ability to evaluate the patient over the phone. Patient has not seen her outpatient in at least a year. We have a lower suspicion for acute flare of lupus at this time.  -CRP elevated at 4.19 but ESR normal at 11.  -Plan to resume Cellcept on Monday following completion of abx    ENT:  -Bilateral epistaxis and nasal packing after Corpak attempt  -ENT on consult, continuing to follow. Left nostril packed with Merocel by ENT, right nostril packed with smaller rhino rocket and Afrin. Unable to posteriorly pack bilaterally due to hypoxia. Ok to use Afrin longer than three days.  -Patient already on  Zosyn  -Administered 1u of platelets for help with epistaxis    Hematology:  Results from last 7 days   Lab Units 24  0338 24  0403 24  1654 24  0416   HEMOGLOBIN g/dL 7.4* 7.5* 8.0* 7.1*   HEMATOCRIT % 23.9* 24.5* 25.7* 23.1*   PLATELETS AUTO x10*3/uL 62* 68* 73* 60*     - Daily CBC  -History: Chronic thrombocytopenia  - DVT Prophylaxis: Holding due to GI bleed, ok to resume from ENT perspective    Infectious Disease: Diverticulitis  Results from last 7 days   Lab Units 24  0338 24  0403 24  1654 24  0416 24  1430 24  0904 24  0250   WBC AUTO x10*3/uL 7.9 4.6 4.5 2.8*   < > 3.3* 2.8*   SED RATE mm/h  --   --   --   --   --  11  --    CRP mg/dL  --   --   --   --   --   --  4.19*    < > = values in this interval not displayed.     Temp (24hrs), Av.4 °C (97.5 °F), Min:35.8 °C (96.4 °F), Max:36.9 °C (98.4 °F)     -Cultures: Blood and urine pending  -Hx E. Faecium UTI, urine culture negative on this admission  -Abx: Zosyn x7 days for diverticulitis (stop ).  -Stool pathogen and C diff negative    Musculoskeletal:   - PT to see   - OT to see       LDA:  CVC 07/15/24 Triple lumen Left Internal jugular (Active)   Placement Date/Time: 07/15/24 1500   Hand Hygiene Performed Prior to CVC Insertion: Yes  Site Prep: Chlorhexidine   Site Prep Agent has Completely Dried Before Insertion: Yes  All 5 Sterile Barriers Used (Gloves, Gown, Cap, Mask, Large Sterile Drape):...   Number of days: 0       Arterial Line 07/15/24 Right Radial (Active)   Placement Date/Time: 07/15/24 (c) 3526   Size: 20 G  Orientation: Right  Location: Radial  Site Prep: Chlorhexidine   Local Anesthetic: Injectable  Insertion attempts: 1  Securement Method: Transparent dressing;Taped   Number of days: 0       Urethral Catheter (Active)   Placement Date/Time: 07/15/24 1300     Number of days: 0         CODE STATUS: DNR and No Intubation    Disposition:     RESTRAINTS: type:  None    ABCDEF Checklist  Analgesia: Spontaneous awakening trial to be pursued if clinically appropriate. RASS goal reviewed  Breathing: Spontaneous breathing trial to be pursued if clinically appropriate. Mechanical power of assisted ventilation reviewed  Choice of analgesia/sedation: Analgesic and sedative agents adjusted per clinical context.   Delirium assessed by CAM, will avoid exacerbating factors  Early mobility and exercise: Physical and occupational therapy engaged  Family: Plan of care, overall trajectory of patient shared with family. Questions elicited and answered as appropriate.     Due to the high probability of life threatening clinical decompensation, the patient required critical care time evaluating and managing this patient.  Critical care time included obtaining a history, examining the patient, ordering and reviewing studies, discussing, developing, and implementing a management plan, evaluating the patient's response to treatment, and discussion with other care team providers. I saw and evaluated the patient myself.  Critical care time was performed exclusive of billable procedures.      Case discussed with attending , Dr. Fausto Martinez DO  PGY-3 Internal Medicine

## 2024-07-20 NOTE — CARE PLAN
The patient's goals for the shift include  deferred    The clinical goals for the shift include Pt will remain HDS this shift.    Over the shift, the patient did not make progress toward the following goals. Barriers to progression include acuteness of illness. Recommendations to address these barriers include continue with plan of care

## 2024-07-20 NOTE — SIGNIFICANT EVENT
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 In the ED, the patient was hypothermic at 32 C, hypotensive at 70/45 and hypoglycemic with glucose 49. CBC showed pancytopenia with WBC 2.4, hemoglobin 8.4, and platelets 68. CMP showed creatinine 2.41, as well as hypoproteinemia and hypoalbuminemia. Given the patients history of adrenal insufficiency, 100 mg IV Solu-Cortef was administered along with dextrose. Endocrinology was consulted who agreed with administration of Solu-Cortef. Unfortunately, blood pressure did not significantly improve with steroids or fluids and the patient required norepinephrine to maintain blood pressure before being admitted to the ICU.    Given the hypotension there was concern for septic shock, and the patient was started on Zosyn which was changed to Linezolid due to prior culture growing enterococcus faecium. Urine culture on this admission was negative and the patient was continued back on Zosyn. CT CAP was ordered which showed diverticulitis of the hepatic flexure, trace ascites, small right pleural effusion, and inflammatory nodules in the right middle and left lower lobe. GI was consulted due to diverticulitis who recommended continuing antibiotics with possible outpatient colonoscopy in 6 weeks before signing off. Endocrinology recommended continuing steroids which were weaned down to IV HC 25 mg q8h. Palliative was consulted due to the frequency and consistency of hospital readmissions, per palliative notes there is a plan to continue outpatient palliative follow up with [facility] but they are not  quite ready for hospice, and palliative signed off. While in the ICU, the patient had multiple dark and bright red BMs concerning for GI bleed and nasal bleeding that warranted removal of the placed NG tube. At that time hemoglobin was 6.6 and 1 unit pRBCs was required.   ENT was consulted due to epistaxis, who recommended Rhino Rocket's which were eventually removed. Further epistaxis was controlled with silver nitrate and as needed afrin with saline spray and Merocel sponge. Hemoglobin again decreased to 6.6 requiring another unit of blood and GI was contacted. GI recommended  possible CTA to evaluate for active bleed, however due to kidney function this was unable to be completed. The patient had been NPO due to diverticulitis and a Corpak was attempted, but was unable to be placed, unfortunately the patient is a poor PEG candidate and GI again signed off. Nephrology was consulted due to likely ischemic and sepsis related ATN on CKD3b and hypernatremia, D5W was administered as maintenance over 1 L, and there was no acute indication for RRT as the patient began to autodiurese. Hematology was consulted due to pancytopenia with plan for possible bone marrow biopsy in 4 weeks to assess for possible underlying etiology of pancytopenia. A repeat CXR was performed as the patient began requiring more oxygen, and this showed pulmonary edema with minimal small pleural effusions. The patient remained stable with Norepinephrine weaned off since 7/16, and after additional 1 unit platelet transfusion was downgraded to the general medicine service.    General medicine to assume care 24 hours after transfer order placement or when the patient physically leaves the ICU.    Physical Exam  Constitutional:       General: She is not in acute distress.     Appearance: She is obese. She is ill-appearing.      Comments: Frail   HENT:      Head: Normocephalic and atraumatic.      Nose:      Comments: Dried blood surrounding the  nares  Merocel in left nare, rhino rocket in right nare  Eyes:      Extraocular Movements: Extraocular movements intact.   Cardiovascular:      Rate and Rhythm: Normal rate and regular rhythm.      Heart sounds: No murmur heard.     No friction rub. No gallop.   Pulmonary:      Effort: Pulmonary effort is normal. No respiratory distress.      Breath sounds: No wheezing or rales.   Abdominal:      General: Abdomen is flat. There is no distension.      Palpations: Abdomen is soft.      Tenderness: There is no abdominal tenderness. There is no guarding.   Musculoskeletal:         General: Swelling (2+ pitting edema bilateral lower extremities) present.   Skin:     General: Skin is warm and dry.   Neurological:      Mental Status: She is alert and oriented to person, place, and time.      Sensory: No sensory deficit.      Motor: Weakness (symmetric upper and lower extremities bilaterally) present.       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 Patient was found to be in shock requiring pressors due to either sepsis or adrenal insufficiency and was admitted to the ICU. There was concern for GI bleed and multiple specialists were consulted before the patient was hemodynamically stable and deemed appropriate for downgrade to the general medicine service.    # Shock 2/2 sepsis versus adrenal insufficiency  # AMS likely 2/2 above  # Hypothermia - resolved  -Pressors weaned off on 7/16  -Blood and urine cultures negative  -Cdiff and stool pathogen panel negative  -CT CAP showing diverticulitis at the hepatic flexure without abscess or obvious  perforation, there are new likely inflammatory nodules in the right middle and left lower lobe, as well as new small right pleural effusion with small amount of ascites  -Repeat CXR showing mild patchy bilateral ground glass opacities, with possible minimal/small pleural effusions  -Continue Zosyn (7/17 - EOT 7/22)  -Continue hydrocortisone 25 mg q8hr, will wean back to home steroids  -Endocrinology consulted, there is concern that the patient is pocketing pills as she continues to present with adrenal insufficiency. Endocrine counseled the patient on the significant danger of this activity. Appreciate recs  -PT/OT  -SLP following    # Epistaxis  # Hematochezia  # Normocytic anemia  # Thrombocytopenia  -S/p 2 units pRBCs due to anemia and 1 unit platelets  -Fecal occult positive  -CT scan showing diverticulitis  -Rhino rockets removed and replaced, Merocel sponge and afrin as needed to prevent oozing/further bleed  -Protonix 40 mg daily  -GI consulted, recommended supportive care, conservative management, possible CTA if active bleeding presents. Patient has poor GFR making CTA difficult to obtain, and is a poor colonoscopy candidate. Ultimately recommended continuing GOC and hospice discussions before signing off. Appreciate recs  -ENT consulted, recommended continuing Merocel sponge and Afrin as needed with as needed rhino rockets to control bleeding. Appreciate recs  -Hematology consulted, recommended outpatient biopsy in 4 weeks. Appreciate recs.    # ATN 2/2 ischemia due to sepsis and GI bleed  # CKD3b  # Hypernatremia - resolved  -Creatinine on admission 2.41, baseline 1.6-1.9  -S/p 1 L of D5W  -Avoid nephrotoxic agents  -Renally dose medications  -Trial 20 mg IV lasix  -Montano in place, strict I/Os  -Nephrology consulted, no indication for urgent RRT. Appreciate recs    # SLE  -Case was discussed with the patients rheumatologist Dr. Hoyos, who had not seen the patient in at least a year but agreed with holding  Cellcept in the setting of above.  -Hold home Cellcept due to possible immunosuppression, plan to resume on Monday after completion of antibiotics    # Malnutrition  Patient has low albumin and protein and third spaces significant amount of fluid, will diurese as tolerated and highly encourage PO intake with protein supplementation.  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 Anticipate 1-2 additional midnights with discharge back to SNF.    Case to be staffed with attending physician,  Cheko Angel D.O. PGY-2

## 2024-07-21 ENCOUNTER — APPOINTMENT (OUTPATIENT)
Dept: CARDIOLOGY | Facility: HOSPITAL | Age: 64
DRG: 871 | End: 2024-07-21
Payer: MEDICARE

## 2024-07-21 VITALS
WEIGHT: 215.17 LBS | BODY MASS INDEX: 35.85 KG/M2 | DIASTOLIC BLOOD PRESSURE: 70 MMHG | OXYGEN SATURATION: 96 % | SYSTOLIC BLOOD PRESSURE: 118 MMHG | RESPIRATION RATE: 15 BRPM | HEIGHT: 65 IN | TEMPERATURE: 96.6 F | HEART RATE: 74 BPM

## 2024-07-21 LAB
ALBUMIN SERPL BCP-MCNC: 2.5 G/DL (ref 3.4–5)
ALBUMIN SERPL BCP-MCNC: 2.6 G/DL (ref 3.4–5)
ALBUMIN SERPL BCP-MCNC: 2.6 G/DL (ref 3.4–5)
ALBUMIN SERPL BCP-MCNC: 2.7 G/DL (ref 3.4–5)
ALP SERPL-CCNC: 91 U/L (ref 33–136)
ALT SERPL W P-5'-P-CCNC: 11 U/L (ref 7–45)
ANION GAP SERPL CALC-SCNC: 12 MMOL/L (ref 10–20)
ANION GAP SERPL CALC-SCNC: 14 MMOL/L (ref 10–20)
ANION GAP SERPL CALC-SCNC: 15 MMOL/L (ref 10–20)
ANION GAP SERPL CALC-SCNC: 15 MMOL/L (ref 10–20)
APPEARANCE UR: ABNORMAL
AST SERPL W P-5'-P-CCNC: 16 U/L (ref 9–39)
ATRIAL RATE: 131 BPM
ATRIAL RATE: 84 BPM
ATRIAL RATE: 93 BPM
ATRIAL RATE: 93 BPM
BASOPHILS # BLD AUTO: 0.05 X10*3/UL (ref 0–0.1)
BASOPHILS NFR BLD AUTO: 0.4 %
BILIRUB SERPL-MCNC: 0.7 MG/DL (ref 0–1.2)
BILIRUB UR STRIP.AUTO-MCNC: NEGATIVE MG/DL
BLOOD EXPIRATION DATE: NORMAL
BUN SERPL-MCNC: 26 MG/DL (ref 6–23)
BUN SERPL-MCNC: 27 MG/DL (ref 6–23)
CALCIUM SERPL-MCNC: 7.6 MG/DL (ref 8.6–10.3)
CALCIUM SERPL-MCNC: 7.7 MG/DL (ref 8.6–10.3)
CALCIUM SERPL-MCNC: 7.8 MG/DL (ref 8.6–10.3)
CALCIUM SERPL-MCNC: 7.9 MG/DL (ref 8.6–10.3)
CHLORIDE SERPL-SCNC: 109 MMOL/L (ref 98–107)
CHLORIDE SERPL-SCNC: 109 MMOL/L (ref 98–107)
CHLORIDE SERPL-SCNC: 111 MMOL/L (ref 98–107)
CHLORIDE SERPL-SCNC: 111 MMOL/L (ref 98–107)
CO2 SERPL-SCNC: 19 MMOL/L (ref 21–32)
CO2 SERPL-SCNC: 21 MMOL/L (ref 21–32)
CO2 SERPL-SCNC: 21 MMOL/L (ref 21–32)
CO2 SERPL-SCNC: 24 MMOL/L (ref 21–32)
COLOR UR: YELLOW
CREAT SERPL-MCNC: 1.72 MG/DL (ref 0.5–1.05)
CREAT SERPL-MCNC: 1.76 MG/DL (ref 0.5–1.05)
CREAT SERPL-MCNC: 1.77 MG/DL (ref 0.5–1.05)
CREAT SERPL-MCNC: 1.85 MG/DL (ref 0.5–1.05)
DISPENSE STATUS: NORMAL
EGFRCR SERPLBLD CKD-EPI 2021: 31 ML/MIN/1.73M*2
EGFRCR SERPLBLD CKD-EPI 2021: 32 ML/MIN/1.73M*2
EGFRCR SERPLBLD CKD-EPI 2021: 32 ML/MIN/1.73M*2
EGFRCR SERPLBLD CKD-EPI 2021: 33 ML/MIN/1.73M*2
EOSINOPHIL # BLD AUTO: 0.01 X10*3/UL (ref 0–0.7)
EOSINOPHIL NFR BLD AUTO: 0.1 %
ERYTHROCYTE [DISTWIDTH] IN BLOOD BY AUTOMATED COUNT: 21 % (ref 11.5–14.5)
ERYTHROCYTE [DISTWIDTH] IN BLOOD BY AUTOMATED COUNT: 21.1 % (ref 11.5–14.5)
GIANT PLATELETS BLD QL SMEAR: NORMAL
GLUCOSE BLD MANUAL STRIP-MCNC: 175 MG/DL (ref 74–99)
GLUCOSE BLD MANUAL STRIP-MCNC: 181 MG/DL (ref 74–99)
GLUCOSE BLD MANUAL STRIP-MCNC: 191 MG/DL (ref 74–99)
GLUCOSE BLD MANUAL STRIP-MCNC: 219 MG/DL (ref 74–99)
GLUCOSE BLD MANUAL STRIP-MCNC: 225 MG/DL (ref 74–99)
GLUCOSE BLD MANUAL STRIP-MCNC: 232 MG/DL (ref 74–99)
GLUCOSE SERPL-MCNC: 178 MG/DL (ref 74–99)
GLUCOSE SERPL-MCNC: 203 MG/DL (ref 74–99)
GLUCOSE SERPL-MCNC: 204 MG/DL (ref 74–99)
GLUCOSE SERPL-MCNC: 219 MG/DL (ref 74–99)
GLUCOSE UR STRIP.AUTO-MCNC: NORMAL MG/DL
HCT VFR BLD AUTO: 23.8 % (ref 36–46)
HCT VFR BLD AUTO: 26.9 % (ref 36–46)
HGB BLD-MCNC: 7.2 G/DL (ref 12–16)
HGB BLD-MCNC: 7.8 G/DL (ref 12–16)
HOLD SPECIMEN: NORMAL
HYALINE CASTS #/AREA URNS AUTO: ABNORMAL /LPF
IMM GRANULOCYTES # BLD AUTO: 0.99 X10*3/UL (ref 0–0.7)
IMM GRANULOCYTES NFR BLD AUTO: 7.2 % (ref 0–0.9)
KETONES UR STRIP.AUTO-MCNC: NEGATIVE MG/DL
LEUKOCYTE ESTERASE UR QL STRIP.AUTO: ABNORMAL
LYMPHOCYTES # BLD AUTO: 2.53 X10*3/UL (ref 1.2–4.8)
LYMPHOCYTES NFR BLD AUTO: 18.4 %
MAGNESIUM SERPL-MCNC: 1.94 MG/DL (ref 1.6–2.4)
MAGNESIUM SERPL-MCNC: 1.98 MG/DL (ref 1.6–2.4)
MAGNESIUM SERPL-MCNC: 2.21 MG/DL (ref 1.6–2.4)
MCH RBC QN AUTO: 28.8 PG (ref 26–34)
MCH RBC QN AUTO: 29.4 PG (ref 26–34)
MCHC RBC AUTO-ENTMCNC: 29 G/DL (ref 32–36)
MCHC RBC AUTO-ENTMCNC: 30.3 G/DL (ref 32–36)
MCV RBC AUTO: 97 FL (ref 80–100)
MCV RBC AUTO: 99 FL (ref 80–100)
MONOCYTES # BLD AUTO: 0.78 X10*3/UL (ref 0.1–1)
MONOCYTES NFR BLD AUTO: 5.7 %
NEUTROPHILS # BLD AUTO: 9.42 X10*3/UL (ref 1.2–7.7)
NEUTROPHILS NFR BLD AUTO: 68.2 %
NITRITE UR QL STRIP.AUTO: NEGATIVE
NRBC BLD-RTO: 1.1 /100 WBCS (ref 0–0)
NRBC BLD-RTO: 2.2 /100 WBCS (ref 0–0)
O+P STL MICRO: NEGATIVE
P AXIS: 40 DEGREES
P AXIS: 53 DEGREES
P AXIS: 65 DEGREES
P OFFSET: 201 MS
P OFFSET: 202 MS
P OFFSET: 210 MS
P ONSET: 137 MS
P ONSET: 137 MS
P ONSET: 150 MS
PH UR STRIP.AUTO: 5.5 [PH]
PHOSPHATE SERPL-MCNC: 2.2 MG/DL (ref 2.5–4.9)
PHOSPHATE SERPL-MCNC: 2.4 MG/DL (ref 2.5–4.9)
PHOSPHATE SERPL-MCNC: 2.4 MG/DL (ref 2.5–4.9)
PHOSPHATE SERPL-MCNC: 3.5 MG/DL (ref 2.5–4.9)
PLATELET # BLD AUTO: 93 X10*3/UL (ref 150–450)
PLATELET # BLD AUTO: 99 X10*3/UL (ref 150–450)
POLYCHROMASIA BLD QL SMEAR: NORMAL
POTASSIUM SERPL-SCNC: 3.1 MMOL/L (ref 3.5–5.3)
POTASSIUM SERPL-SCNC: 3.6 MMOL/L (ref 3.5–5.3)
POTASSIUM SERPL-SCNC: 3.8 MMOL/L (ref 3.5–5.3)
POTASSIUM SERPL-SCNC: 4 MMOL/L (ref 3.5–5.3)
PR INTERVAL: 136 MS
PR INTERVAL: 158 MS
PR INTERVAL: 160 MS
PRODUCT BLOOD TYPE: 5100
PRODUCT CODE: NORMAL
PROT SERPL-MCNC: 4.9 G/DL (ref 6.4–8.2)
PROT UR STRIP.AUTO-MCNC: ABNORMAL MG/DL
Q ONSET: 216 MS
Q ONSET: 217 MS
Q ONSET: 217 MS
Q ONSET: 218 MS
QRS COUNT: 14 BEATS
QRS COUNT: 15 BEATS
QRS COUNT: 15 BEATS
QRS COUNT: 22 BEATS
QRS DURATION: 100 MS
QRS DURATION: 94 MS
QRS DURATION: 96 MS
QRS DURATION: 98 MS
QT INTERVAL: 282 MS
QT INTERVAL: 364 MS
QT INTERVAL: 366 MS
QT INTERVAL: 370 MS
QTC CALCULATION(BAZETT): 423 MS
QTC CALCULATION(BAZETT): 437 MS
QTC CALCULATION(BAZETT): 452 MS
QTC CALCULATION(BAZETT): 455 MS
QTC FREDERICIA: 369 MS
QTC FREDERICIA: 413 MS
QTC FREDERICIA: 421 MS
QTC FREDERICIA: 423 MS
R AXIS: -14 DEGREES
R AXIS: -17 DEGREES
R AXIS: -2 DEGREES
R AXIS: -5 DEGREES
RBC # BLD AUTO: 2.45 X10*6/UL (ref 4–5.2)
RBC # BLD AUTO: 2.71 X10*6/UL (ref 4–5.2)
RBC # UR STRIP.AUTO: ABNORMAL /UL
RBC #/AREA URNS AUTO: ABNORMAL /HPF
RBC MORPH BLD: NORMAL
SODIUM SERPL-SCNC: 141 MMOL/L (ref 136–145)
SODIUM SERPL-SCNC: 141 MMOL/L (ref 136–145)
SODIUM SERPL-SCNC: 142 MMOL/L (ref 136–145)
SODIUM SERPL-SCNC: 142 MMOL/L (ref 136–145)
SP GR UR STRIP.AUTO: 1.02
T AXIS: 187 DEGREES
T AXIS: 203 DEGREES
T AXIS: 216 DEGREES
T AXIS: 260 DEGREES
T OFFSET: 358 MS
T OFFSET: 399 MS
T OFFSET: 399 MS
T OFFSET: 403 MS
UNIT ABO: NORMAL
UNIT NUMBER: NORMAL
UNIT RH: NORMAL
UNIT VOLUME: 39
UROBILINOGEN UR STRIP.AUTO-MCNC: NORMAL MG/DL
VENTRICULAR RATE: 135 BPM
VENTRICULAR RATE: 84 BPM
VENTRICULAR RATE: 93 BPM
VENTRICULAR RATE: 93 BPM
WBC # BLD AUTO: 12.1 X10*3/UL (ref 4.4–11.3)
WBC # BLD AUTO: 13.8 X10*3/UL (ref 4.4–11.3)
WBC #/AREA URNS AUTO: >50 /HPF
WBC CLUMPS #/AREA URNS AUTO: ABNORMAL /HPF
YEAST BUDDING #/AREA UR COMP ASSIST: PRESENT /HPF

## 2024-07-21 PROCEDURE — 2500000002 HC RX 250 W HCPCS SELF ADMINISTERED DRUGS (ALT 637 FOR MEDICARE OP, ALT 636 FOR OP/ED)

## 2024-07-21 PROCEDURE — 83735 ASSAY OF MAGNESIUM: CPT | Performed by: EMERGENCY MEDICINE

## 2024-07-21 PROCEDURE — 85027 COMPLETE CBC AUTOMATED: CPT

## 2024-07-21 PROCEDURE — 84100 ASSAY OF PHOSPHORUS: CPT

## 2024-07-21 PROCEDURE — 94640 AIRWAY INHALATION TREATMENT: CPT

## 2024-07-21 PROCEDURE — 93005 ELECTROCARDIOGRAM TRACING: CPT

## 2024-07-21 PROCEDURE — 85025 COMPLETE CBC W/AUTO DIFF WBC: CPT

## 2024-07-21 PROCEDURE — 83735 ASSAY OF MAGNESIUM: CPT

## 2024-07-21 PROCEDURE — 80069 RENAL FUNCTION PANEL: CPT | Mod: CCI | Performed by: EMERGENCY MEDICINE

## 2024-07-21 PROCEDURE — 2500000005 HC RX 250 GENERAL PHARMACY W/O HCPCS: Performed by: EMERGENCY MEDICINE

## 2024-07-21 PROCEDURE — 2500000004 HC RX 250 GENERAL PHARMACY W/ HCPCS (ALT 636 FOR OP/ED)

## 2024-07-21 PROCEDURE — 2500000001 HC RX 250 WO HCPCS SELF ADMINISTERED DRUGS (ALT 637 FOR MEDICARE OP): Performed by: INTERNAL MEDICINE

## 2024-07-21 PROCEDURE — 36415 COLL VENOUS BLD VENIPUNCTURE: CPT

## 2024-07-21 PROCEDURE — 2500000005 HC RX 250 GENERAL PHARMACY W/O HCPCS: Performed by: INTERNAL MEDICINE

## 2024-07-21 PROCEDURE — C9113 INJ PANTOPRAZOLE SODIUM, VIA: HCPCS

## 2024-07-21 PROCEDURE — 80069 RENAL FUNCTION PANEL: CPT | Mod: CCI | Performed by: INTERNAL MEDICINE

## 2024-07-21 PROCEDURE — 2500000005 HC RX 250 GENERAL PHARMACY W/O HCPCS

## 2024-07-21 PROCEDURE — 2500000001 HC RX 250 WO HCPCS SELF ADMINISTERED DRUGS (ALT 637 FOR MEDICARE OP)

## 2024-07-21 PROCEDURE — 82947 ASSAY GLUCOSE BLOOD QUANT: CPT

## 2024-07-21 PROCEDURE — 2020000001 HC ICU ROOM DAILY

## 2024-07-21 PROCEDURE — 87040 BLOOD CULTURE FOR BACTERIA: CPT | Mod: STJLAB | Performed by: INTERNAL MEDICINE

## 2024-07-21 PROCEDURE — 81001 URINALYSIS AUTO W/SCOPE: CPT | Performed by: INTERNAL MEDICINE

## 2024-07-21 PROCEDURE — 2500000004 HC RX 250 GENERAL PHARMACY W/ HCPCS (ALT 636 FOR OP/ED): Performed by: INTERNAL MEDICINE

## 2024-07-21 PROCEDURE — 83735 ASSAY OF MAGNESIUM: CPT | Performed by: INTERNAL MEDICINE

## 2024-07-21 PROCEDURE — 84145 PROCALCITONIN (PCT): CPT | Mod: STJLAB | Performed by: NURSE PRACTITIONER

## 2024-07-21 PROCEDURE — 99291 CRITICAL CARE FIRST HOUR: CPT

## 2024-07-21 PROCEDURE — 80053 COMPREHEN METABOLIC PANEL: CPT

## 2024-07-21 PROCEDURE — 84145 PROCALCITONIN (PCT): CPT | Mod: STJLAB | Performed by: INTERNAL MEDICINE

## 2024-07-21 RX ORDER — METOPROLOL TARTRATE 25 MG/1
6.25 TABLET, FILM COATED ORAL 2 TIMES DAILY
Status: DISCONTINUED | OUTPATIENT
Start: 2024-07-21 | End: 2024-07-26

## 2024-07-21 RX ORDER — METOPROLOL TARTRATE 1 MG/ML
5 INJECTION, SOLUTION INTRAVENOUS ONCE
Status: COMPLETED | OUTPATIENT
Start: 2024-07-21 | End: 2024-07-21

## 2024-07-21 RX ORDER — NOREPINEPHRINE BITARTRATE/D5W 8 MG/250ML
0-.2 PLASTIC BAG, INJECTION (ML) INTRAVENOUS CONTINUOUS
Status: DISCONTINUED | OUTPATIENT
Start: 2024-07-21 | End: 2024-07-22

## 2024-07-21 RX ORDER — POTASSIUM CHLORIDE 14.9 MG/ML
20 INJECTION INTRAVENOUS
Status: COMPLETED | OUTPATIENT
Start: 2024-07-21 | End: 2024-07-21

## 2024-07-21 RX ORDER — POTASSIUM PHOSPHATE, MONOBASIC AND POTASSIUM PHOSPHATE, DIBASIC 224; 236 MG/ML; MG/ML
15 INJECTION, SOLUTION INTRAVENOUS ONCE
Status: COMPLETED | OUTPATIENT
Start: 2024-07-21 | End: 2024-07-21

## 2024-07-21 RX ORDER — MAGNESIUM SULFATE 1 G/100ML
1 INJECTION INTRAVENOUS ONCE
Status: COMPLETED | OUTPATIENT
Start: 2024-07-21 | End: 2024-07-21

## 2024-07-21 ASSESSMENT — PAIN - FUNCTIONAL ASSESSMENT
PAIN_FUNCTIONAL_ASSESSMENT: 0-10

## 2024-07-21 ASSESSMENT — COGNITIVE AND FUNCTIONAL STATUS - GENERAL
CLIMB 3 TO 5 STEPS WITH RAILING: TOTAL
TURNING FROM BACK TO SIDE WHILE IN FLAT BAD: A LOT
DRESSING REGULAR UPPER BODY CLOTHING: A LITTLE
CLIMB 3 TO 5 STEPS WITH RAILING: TOTAL
TURNING FROM BACK TO SIDE WHILE IN FLAT BAD: TOTAL
TOILETING: A LOT
MOBILITY SCORE: 7
PERSONAL GROOMING: TOTAL
MOVING TO AND FROM BED TO CHAIR: TOTAL
WALKING IN HOSPITAL ROOM: TOTAL
EATING MEALS: TOTAL
TOILETING: TOTAL
MOVING FROM LYING ON BACK TO SITTING ON SIDE OF FLAT BED WITH BEDRAILS: A LOT
DRESSING REGULAR UPPER BODY CLOTHING: TOTAL
DAILY ACTIVITIY SCORE: 6
PERSONAL GROOMING: TOTAL
MOBILITY SCORE: 9
EATING MEALS: TOTAL
HELP NEEDED FOR BATHING: A LOT
DRESSING REGULAR LOWER BODY CLOTHING: TOTAL
HELP NEEDED FOR BATHING: TOTAL
STANDING UP FROM CHAIR USING ARMS: TOTAL
DRESSING REGULAR LOWER BODY CLOTHING: TOTAL
DAILY ACTIVITIY SCORE: 10
STANDING UP FROM CHAIR USING ARMS: TOTAL
MOVING FROM LYING ON BACK TO SITTING ON SIDE OF FLAT BED WITH BEDRAILS: A LITTLE
MOVING TO AND FROM BED TO CHAIR: TOTAL
WALKING IN HOSPITAL ROOM: TOTAL

## 2024-07-21 ASSESSMENT — PAIN SCALES - GENERAL
PAINLEVEL_OUTOF10: 0 - NO PAIN

## 2024-07-21 NOTE — SIGNIFICANT EVENT
At 12:15 AM I was notified by nursing staff that patient was in atrial fibrillation with RVR confirmed by EKG.  Rates were 140s-170s. Blood pressure at initial time of RVR was 90s/50s.  Patient endorsed being confused. I ordered a CBC and RFP to evaluate hemodynamic status as well as electrolyte status.  Also, a 500 mL bolus of LR was given over 1 hour in duration due to patient's CHF history with EF of 40-45%.  During this time patient's blood pressure continued to decline down to 70s/30s, due to patient's significant medical history including JED on CKD digoxin was not indicated, and immunologic history in the setting of anemia, COPD with pleural effusions on admission administration of amiodarone was questionable.  After completion of the entirety LR bolus patient's blood pressure was holding at 90s/70s, at that time I had discussed this case extensively with the attending physician on overnight.  We came to the conclusion that we would attempt a single dose of Lopressor 5 mg.  After the Lopressor 5 mg patient remained in atrial fibrillation with RVR with rates now in the 120s-130s.  Blood pressure subsequently dropped to 90s/50s.  During administration of Lopressor I reached out to critical care team to inform them of this patient and possible need for transfer and spoke with resident on-call as well as Dr. Lambert attending physician for transfer to intensive care unit as patient was hemodynamically unstable A-fib with RVR and may need vasopressors in the setting of medications to treat the RVR.  After which patient's blood pressure dropped to 80s/40s and rates were subsequently in the 140s-150s.  Prior to patient being transferred to the ICU patient lost all IV access.  Patient will now remain under the critical care medical team until deemed fit for transfer out of the ICU.  Dr. Lambert has accepted the patient, and patient will be subsequently transferred to the ICU for higher level of care and possible  vasopressor intervention.  Dr. Bajwa was present for the entirety of this event and I discussed this case in depth with him.    Andrew Aranda M.D.  Internal Medicine PGY-2

## 2024-07-21 NOTE — NURSING NOTE
0015 pt HR in 120s-160s, EKG taken showing afib rvr. Sbp 90s. Pt asymptomatic. MD notified, seen pt at bedside    0030 Repeat cbc and 500mL fluid bolus ordered    0100 pt remains in afib rvr, HR 120s-160s. Sbp 90s to low 100s    0130 5mg iv lopressor given. HR still sustaining 120s, sbp 90s. Remains asymptomatic. MD at bedside      0200 order placed for pt to be transferred to icu

## 2024-07-21 NOTE — CARE PLAN
Patient free from fall or injury this shift. Pt is drowsy and disoriented to time this shift. Pt responds appropriately to verbal stimuli. Pt has been pain and c/o free this shift. No s/s resp distress. Urine output low, appetite poor. Pt has refused lunch and dinner. Provider aware. GI to eval for Peg and or eval for PICC placement for supplemental nutrition. Pt frequently incontinent loose stools. No epistaxis noted this shift, nasal packing remains in place. Montano removed and new UA via straight cath resulted with culture pending. Pt remains on IV ATB. Pt spontaneously converted to NSR this shift following electrolyte replacement. Pressors off approx 0800. VSS. Will continue to monitor pt condition.

## 2024-07-21 NOTE — CONSULTS
Reason For Consult  Hypotension and Afib RVR    History Of Present Illness  Bing Holliday is a 63 y.o. female who was upgraded to  ICU from medicine for hypotension and Afib RVR. Medical history is significant for systemic lupus erythematosus, lupus cerebritis, rheumatoid arthritis, adrenal cortical insufficiency, COPD, diabetes, hypertension, paroxysmal atrial fibrillation (no longer Eliquis due to thrombocytopenia), hyperlipidemia, CKD, Raynaud's syndrome, hyperparathyroidism, pacemaker, seizures, and psychosis. Patient was downgraded to medicine after five days in the ICU for management shock secondary to adrenal sufficiency and/or sepsis. She was reported to have episodes of Afib RVR, confirmed by EKG.  On evaluation, she was found to be hypotensive. Heart rates in 140s-170s. Blood pressure at initial time of RVR was 90s/50s and declined to 70s/30s after she received 500 ml of IVF and 5 mg of Metoprolol. Patient remained in afib RVR, Digoxin was considered nut not administered due to patient's significant medical history including JED on CKD. ICU team was consulted to upgrade the patient for higher level of care.     The patient was transferred to the ICU further further evaluation and management. Bedside ultrasound show IVC with positive volume. She was found to have low potassium, low phosphorous, and low magnesium. We proceeded to replenish her electrolytes and the patient converted to sinus rhythm. She denies headache, chest pain, or abdominal pain.      Scheduled medications  atorvastatin, 40 mg, oral, Nightly  budesonide, 0.5 mg, nebulization, BID  [Held by provider] docusate sodium, 100 mg, oral, BID  folic acid, 1 mg, oral, Daily  formoterol, 20 mcg, nebulization, BID  [Held by provider] heparin (porcine), 5,000 Units, subcutaneous, q8h  hydrocortisone sodium succinate, 25 mg, intravenous, q8h  insulin lispro, 0-5 Units, subcutaneous, q4h  ipratropium-albuteroL, 3 mL, nebulization,  TID  levETIRAcetam, 500 mg, intravenous, q12h  [Held by provider] melatonin, 5 mg, oral, Nightly  metoprolol tartrate, 6.25 mg, oral, BID  [Held by provider] mycophenolate, 1,000 mg, oral, BID  pantoprazole, 40 mg, intravenous, Daily  piperacillin-tazobactam, 3.375 g, intravenous, q8h  polyethylene glycol, 17 g, oral, Daily  risperiDONE, 3 mg, oral, BID  sertraline, 25 mg, oral, Nightly  [Held by provider] sodium chloride, 2 spray, Each Nostril, TID  thiamine, 100 mg, oral, Daily      Continuous medications  Adult Clinimix Parenteral Nutrition, 83 mL/hr, Last Rate: 83 mL/hr (07/21/24 2234)  norepinephrine, 0-0.2 mcg/kg/min, Last Rate: Stopped (07/21/24 0830)      PRN medications  PRN medications: acetaminophen, dextrose, dextrose, glucagon, glucagon, ipratropium-albuteroL, magnesium sulfate, oxygen, oxymetazoline, potassium chloride      Past Medical History  She has a past medical history of Abnormal kidney function (04/04/2023), Acute headache (03/10/2024), Acute iritis of right eye (07/24/2015), Acute low back pain (10/30/2023), Acute upper respiratory infection, unspecified (03/04/2020), Acute upper respiratory infection, unspecified (09/30/2015), Arthralgia of right knee (02/14/2024), Arthritis, Atypical facial pain (03/10/2024), Body mass index (BMI) 23.0-23.9, adult (10/15/2021), Body mass index (BMI) 33.0-33.9, adult (03/04/2020), Cardiomegaly (08/27/2013), Chest pain (06/10/2022), Chronic kidney disease, stage 3 unspecified (Multi) (07/02/2013), Confusional state (03/10/2024), Contact with and (suspected) exposure to covid-19 (04/04/2023), Delirium (03/10/2024), Disease of pericardium, unspecified ([facility]) (07/02/2013), Encounter for follow-up examination after completed treatment for conditions other than malignant neoplasm (10/06/2022), Generalized contraction of visual field, right eye (01/29/2015), Hearing loss (03/10/2024), History of cataract (03/10/2024), History of thrombocytopenia (03/10/2024),  Homonymous bilateral field defects, right side (04/29/2016), Hypertensive chronic kidney disease with stage 1 through stage 4 chronic kidney disease, or unspecified chronic kidney disease (07/02/2013), Laceration without foreign body, left foot, initial encounter (07/03/2018), Localized edema (03/10/2024), Localized, primary osteoarthritis (02/14/2024), Mass of skin (03/10/2024), Migraine with aura, not intractable, without status migrainosus (10/24/2022), Open wound (03/10/2024), Open wound of left foot (03/10/2024), Other conditions influencing health status (07/02/2013), Other conditions influencing health status (07/02/2013), Other conditions influencing health status (07/02/2013), Other conditions influencing health status (05/22/2015), Other conditions influencing health status (10/24/2022), Other conditions influencing health status (03/14/2022), Other long term (current) drug therapy (10/24/2022), Overweight with body mass index (BMI) 25.0-29.9 (03/10/2024), Pain of toe (03/10/2024), Personal history of COVID-19 (04/04/2023), Personal history of diseases of the blood and blood-forming organs and certain disorders involving the immune mechanism (07/02/2013), Personal history of diseases of the skin and subcutaneous tissue (08/11/2015), Personal history of nephrotic syndrome (07/02/2013), Personal history of other diseases of the circulatory system (08/27/2013), Personal history of other diseases of the nervous system and sense organs, Personal history of other diseases of the respiratory system, Personal history of other infectious and parasitic diseases (07/02/2013), Personal history of other specified conditions, Personal history of other specified conditions (08/27/2013), Posterior epistaxis (03/10/2024), Puckering of macula, right eye (10/24/2022), Raynaud's syndrome without gangrene (07/02/2013), Sinusitis (03/10/2024), Systemic lupus erythematosus, unspecified (Multi) (07/24/2015), Systemic lupus  erythematosus, unspecified (Multi) (07/24/2015), Systemic lupus erythematosus, unspecified (Multi) (07/24/2015), 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) (07/02/2013), Type 2 diabetes mellitus with mild nonproliferative diabetic retinopathy without macular edema, left eye (Multi) (07/27/2015), Type 2 diabetes mellitus with mild nonproliferative diabetic retinopathy without macular edema, unspecified eye (Multi) (07/24/2015), Type 2 diabetes mellitus with proliferative diabetic retinopathy without macular edema, right eye (Multi) (07/27/2015), Type 2 diabetes mellitus with proliferative diabetic retinopathy without macular edema, unspecified eye (Multi) (07/24/2015), Unspecified acute and subacute iridocyclitis (07/24/2015), and Unspecified open wound, left foot, sequela (07/03/2018).    Surgical History  She has a past surgical history that includes Eye surgery (03/06/2015); Total hip arthroplasty (01/29/2015); Cholecystectomy (01/29/2015); Other surgical history (01/29/2015); Other surgical history (01/29/2015); Ankle surgery (01/29/2015); Foot surgery (01/29/2015); MR angio head wo IV contrast (7/26/2013); MR angio neck wo IV contrast (7/26/2013); CT guided percutaneous biopsy bone deep (5/4/2021); MR angio head wo IV contrast (9/17/2021); MR angio neck wo IV contrast (9/17/2021); MR angio neck wo IV contrast (3/25/2023); MR angio head wo IV contrast (3/25/2023); and Cardiac electrophysiology procedure (Left, 5/17/2024).     Social History  She reports that she has never smoked. She has never been exposed to tobacco smoke. She has never used smokeless tobacco. She reports that she does not currently use alcohol. She reports that she does not use drugs.    Family History  Family History   Problem Relation Name Age of Onset    Other (RENAL DISEASE) Mother          END STAGE    Other (CARDIAC DISORDER) Mother      Cataracts Mother      Stroke Mother      Diabetes Mother      Kidney disease Mother    "   Lupus Mother      Other (CARDIAC DISORDER) Father      COPD Father      Glaucoma Father      Hypertension Father      Sleep apnea Father      Other (CARDIAC DISORDER) Sister      Depression Sister      Kidney disease Sister      Sickle cell trait Sister      Sleep apnea Daughter          Allergies  Ace inhibitors, Hydroxychloroquine, Lisinopril, and Sulfa (sulfonamide antibiotics)    Review of Systems  Patient is afebrile, denies chills, nausea, chest pain, or abdominal.      Physical Exam  Constitutional:       Appearance: She is well-developed. She is ill-appearing.   HENT:      Head: Normocephalic and atraumatic.      Right Ear: External ear normal.      Left Ear: External ear normal.      Nose:      Comments: Merocel in left nostril, gauze in right nostril.     Mouth/Throat:      Mouth: Mucous membranes are dry.   Eyes:      General: No scleral icterus.     Extraocular Movements: Extraocular movements intact.      Conjunctiva/sclera: Conjunctivae normal.      Pupils: Pupils are equal, round, and reactive to light.   Cardiovascular:      Rate and Rhythm: Normal rate and regular rhythm.      Heart sounds: No murmur heard.  Pulmonary:      Effort: Pulmonary effort is normal. No respiratory distress.      Breath sounds: Normal breath sounds.   Abdominal:      Palpations: Abdomen is soft.      Tenderness: There is no abdominal tenderness. There is no guarding or rebound.   Musculoskeletal:         General: No swelling.      Cervical back: Neck supple.   Skin:     General: Skin is warm and dry.      Comments: Old, closed skin wounds on lower legs bilaterally. Do not appear infected.   Neurological:      Mental Status: She is alert. She is disoriented.      Comments: Awake, alert, answers questions. Oriented to person, month, and year. Follows commands.      Last Recorded Vitals  Blood pressure 80/64, pulse (!) 138, temperature 35.5 °C (95.9 °F), temperature source Temporal, resp. rate 24, height 1.651 m (5' 5\"), " weight 97.6 kg (215 lb 2.7 oz), SpO2 94%.    Relevant Results  Review of Systems   Constitutional:  Negative for chills, diaphoresis and fever.   HENT:  Positive for nosebleeds (dripping). Negative for drooling, hearing loss and sneezing.    Eyes:  Negative for pain and discharge.   Respiratory:  Negative for cough, chest tightness, shortness of breath, wheezing and stridor.    Cardiovascular:  Negative for chest pain.   Gastrointestinal:  Negative for abdominal pain, diarrhea and vomiting.   Genitourinary:  Negative for flank pain.   Musculoskeletal:  Negative for back pain, neck pain and neck stiffness.   Skin:  Negative for pallor and rash.   Neurological:  Negative for tremors, seizures and headaches.   Psychiatric/Behavioral:  Positive for hallucinations (Periodic).         Results for orders placed or performed during the hospital encounter of 07/15/24 (from the past 24 hour(s))   POCT GLUCOSE   Result Value Ref Range    POCT Glucose 175 (H) 74 - 99 mg/dL   Comprehensive Metabolic Panel   Result Value Ref Range    Glucose 178 (H) 74 - 99 mg/dL    Sodium 142 136 - 145 mmol/L    Potassium 3.6 3.5 - 5.3 mmol/L    Chloride 111 (H) 98 - 107 mmol/L    Bicarbonate 21 21 - 32 mmol/L    Anion Gap 14 10 - 20 mmol/L    Urea Nitrogen 26 (H) 6 - 23 mg/dL    Creatinine 1.76 (H) 0.50 - 1.05 mg/dL    eGFR 32 (L) >60 mL/min/1.73m*2    Calcium 7.9 (L) 8.6 - 10.3 mg/dL    Albumin 2.6 (L) 3.4 - 5.0 g/dL    Alkaline Phosphatase 91 33 - 136 U/L    Total Protein 4.9 (L) 6.4 - 8.2 g/dL    AST 16 9 - 39 U/L    Bilirubin, Total 0.7 0.0 - 1.2 mg/dL    ALT 11 7 - 45 U/L   Magnesium   Result Value Ref Range    Magnesium 1.94 1.60 - 2.40 mg/dL   Phosphorus   Result Value Ref Range    Phosphorus 2.4 (L) 2.5 - 4.9 mg/dL   CBC and Auto Differential   Result Value Ref Range    WBC 13.8 (H) 4.4 - 11.3 x10*3/uL    nRBC 2.2 (H) 0.0 - 0.0 /100 WBCs    RBC 2.71 (L) 4.00 - 5.20 x10*6/uL    Hemoglobin 7.8 (L) 12.0 - 16.0 g/dL    Hematocrit 26.9 (L)  36.0 - 46.0 %    MCV 99 80 - 100 fL    MCH 28.8 26.0 - 34.0 pg    MCHC 29.0 (L) 32.0 - 36.0 g/dL    RDW 21.1 (H) 11.5 - 14.5 %    Platelets 99 (L) 150 - 450 x10*3/uL    Neutrophils % 68.2 40.0 - 80.0 %    Immature Granulocytes %, Automated 7.2 (H) 0.0 - 0.9 %    Lymphocytes % 18.4 13.0 - 44.0 %    Monocytes % 5.7 2.0 - 10.0 %    Eosinophils % 0.1 0.0 - 6.0 %    Basophils % 0.4 0.0 - 2.0 %    Neutrophils Absolute 9.42 (H) 1.20 - 7.70 x10*3/uL    Immature Granulocytes Absolute, Automated 0.99 (H) 0.00 - 0.70 x10*3/uL    Lymphocytes Absolute 2.53 1.20 - 4.80 x10*3/uL    Monocytes Absolute 0.78 0.10 - 1.00 x10*3/uL    Eosinophils Absolute 0.01 0.00 - 0.70 x10*3/uL    Basophils Absolute 0.05 0.00 - 0.10 x10*3/uL   Morphology   Result Value Ref Range    RBC Morphology See Below     Polychromasia Mild     Giant Platelets Few    Renal function panel   Result Value Ref Range    Glucose 203 (H) 74 - 99 mg/dL    Sodium 141 136 - 145 mmol/L    Potassium 3.8 3.5 - 5.3 mmol/L    Chloride 111 (H) 98 - 107 mmol/L    Bicarbonate 19 (L) 21 - 32 mmol/L    Anion Gap 15 10 - 20 mmol/L    Urea Nitrogen 26 (H) 6 - 23 mg/dL    Creatinine 1.77 (H) 0.50 - 1.05 mg/dL    eGFR 32 (L) >60 mL/min/1.73m*2    Calcium 7.8 (L) 8.6 - 10.3 mg/dL    Phosphorus 2.4 (L) 2.5 - 4.9 mg/dL    Albumin 2.7 (L) 3.4 - 5.0 g/dL   Magnesium   Result Value Ref Range    Magnesium 2.21 1.60 - 2.40 mg/dL   POCT GLUCOSE   Result Value Ref Range    POCT Glucose 181 (H) 74 - 99 mg/dL   ECG 12 lead   Result Value Ref Range    Ventricular Rate 84 BPM    Atrial Rate 84 BPM    OK Interval 136 ms    QRS Duration 94 ms    QT Interval 370 ms    QTC Calculation(Bazett) 437 ms    P Axis 40 degrees    R Axis -2 degrees    T Axis 260 degrees    QRS Count 14 beats    Q Onset 218 ms    P Onset 150 ms    P Offset 210 ms    T Offset 403 ms    QTC Fredericia 413 ms   Blood Culture    Specimen: Peripheral Venipuncture; Blood culture   Result Value Ref Range    Blood Culture Loaded on  Instrument - Culture in progress    POCT GLUCOSE   Result Value Ref Range    POCT Glucose 219 (H) 74 - 99 mg/dL   Lavender Top   Result Value Ref Range    Extra Tube Hold for add-ons.    PST Top   Result Value Ref Range    Extra Tube Hold for add-ons.    POCT GLUCOSE   Result Value Ref Range    POCT Glucose 225 (H) 74 - 99 mg/dL   Urinalysis with Reflex Microscopic   Result Value Ref Range    Color, Urine Yellow Light-Yellow, Yellow, Dark-Yellow    Appearance, Urine Turbid (N) Clear    Specific Gravity, Urine 1.021 1.005 - 1.035    pH, Urine 5.5 5.0, 5.5, 6.0, 6.5, 7.0, 7.5, 8.0    Protein, Urine 70 (1+) (A) NEGATIVE, 10 (TRACE), 20 (TRACE) mg/dL    Glucose, Urine Normal Normal mg/dL    Blood, Urine 0.2 (2+) (A) NEGATIVE    Ketones, Urine NEGATIVE NEGATIVE mg/dL    Bilirubin, Urine NEGATIVE NEGATIVE    Urobilinogen, Urine Normal Normal mg/dL    Nitrite, Urine NEGATIVE NEGATIVE    Leukocyte Esterase, Urine 500 Margarita/µL (A) NEGATIVE   Microscopic Only, Urine   Result Value Ref Range    WBC, Urine >50 (A) 1-5, NONE /HPF    WBC Clumps, Urine MANY Reference range not established. /HPF    RBC, Urine 11-20 (A) NONE, 1-2, 3-5 /HPF    Budding Yeast, Urine PRESENT (A) NONE /HPF    Hyaline Casts, Urine OCCASIONAL (A) NONE /LPF   Renal Function Panel   Result Value Ref Range    Glucose 219 (H) 74 - 99 mg/dL    Sodium 141 136 - 145 mmol/L    Potassium 4.0 3.5 - 5.3 mmol/L    Chloride 109 (H) 98 - 107 mmol/L    Bicarbonate 24 21 - 32 mmol/L    Anion Gap 12 10 - 20 mmol/L    Urea Nitrogen 27 (H) 6 - 23 mg/dL    Creatinine 1.85 (H) 0.50 - 1.05 mg/dL    eGFR 31 (L) >60 mL/min/1.73m*2    Calcium 7.7 (L) 8.6 - 10.3 mg/dL    Phosphorus 3.5 2.5 - 4.9 mg/dL    Albumin 2.6 (L) 3.4 - 5.0 g/dL   Magnesium   Result Value Ref Range    Magnesium 1.98 1.60 - 2.40 mg/dL   Lavender Top   Result Value Ref Range    Extra Tube Hold for add-ons.    SST TOP   Result Value Ref Range    Extra Tube Hold for add-ons.    POCT GLUCOSE   Result Value Ref Range     POCT Glucose 191 (H) 74 - 99 mg/dL   POCT GLUCOSE   Result Value Ref Range    POCT Glucose 232 (H) 74 - 99 mg/dL     *Note: Due to a large number of results and/or encounters for the requested time period, some results have not been displayed. A complete set of results can be found in Results Review.          ECG 12 lead    Result Date: 7/21/2024  Normal sinus rhythm Possible Left ventricular hypertrophy with repolarization abnormality Abnormal ECG When compared with ECG of 18-JUL-2024 21:49, (unconfirmed) ST no longer depressed in Anterior leads Confirmed by Cheko Mike ([id number]) on 7/21/2024 2:43:43 PM    ECG 12 lead    Result Date: 7/21/2024  Normal sinus rhythm Possible Left ventricular hypertrophy with repolarization abnormality Abnormal ECG When compared with ECG of 15-JUL-2024 13:54, (unconfirmed) Sinus rhythm has replaced Atrial fibrillation QT has lengthened Confirmed by Cheko Mike ([id number]) on 7/21/2024 2:43:13 PM    ECG 12 lead    Result Date: 7/21/2024  Normal sinus rhythm Minimal voltage criteria for LVH, may be normal variant ST & T wave abnormality, consider inferolateral ischemia Abnormal ECG When compared with ECG of 21-JUL-2024 00:20, (unconfirmed) Sinus rhythm has replaced Atrial fibrillation Vent. rate has decreased BY  51 BPM ST no longer depressed in Lateral leads T wave inversion more evident in Inferior leads T wave inversion less evident in Lateral leads    ECG 12 Lead    Result Date: 7/21/2024  Atrial fibrillation with rapid ventricular response Moderate voltage criteria for LVH, may be normal variant Marked ST abnormality, possible lateral subendocardial injury Abnormal ECG When compared with ECG of 18-JUL-2024 21:54, (unconfirmed) Atrial fibrillation has replaced Sinus rhythm ST more depressed in Lateral leads    XR chest 1 view    Result Date: 7/17/2024  Interpreted By:  Dhaval Hensley, STUDY: XR CHEST 1 VIEW; 7/17/2024 10:59 am   INDICATION: Signs/Symptoms:increasing oxygen  requirement.   COMPARISON: 07/15/2024   ACCESSION NUMBER(S): [id number]   ORDERING CLINICIAN: TEETEE MOONEY   TECHNIQUE: 1 view of the chest was performed.   FINDINGS: The lungs are adequately inflated. No acute consolidation, however, there is suggestion of patchy bilateral ground-glass opacity as well mild prominence of the interstitium which is nonspecific but can be seen with mild pulmonary edema as well as inflammatory or potentially atypical infectious etiologies. Possible minimal-small pleural effusions, limited in evaluation on this single portable view. No pneumothorax.  The cardiomediastinal silhouette is mildly enlarged.       Mild patchy bilateral ground-glass opacities as well as mild prominence of the interstitium. Possible minimal-small pleural effusions. Mild cardiomegaly. Findings are concerning for CHF and mild pulmonary edema however inflammatory or atypical infectious etiologies could have this appearance.   Signed by: Dhaval Hensley 7/17/2024 11:38 AM Dictation workstation:   [id number]    XR abdomen 1 view    Result Date: 7/16/2024  Interpreted By:  Katrina Brice, STUDY: XR ABDOMEN 1 VIEW;  7/16/2024 10:00 pm   INDICATION: Signs/Symptoms:ng placement.   COMPARISON: CT 07/15/2024   ACCESSION NUMBER(S): [id number]   ORDERING CLINICIAN: JAMAL LEBRON   FINDINGS: NG tube noted with tip overlying the gastric antrum region. Nonobstructive bowel gas pattern. Limited evaluation of pneumoperitoneum on supine imaging, however no gross evidence of free air is noted.   Visualized lungs are clear.   Osseous structures demonstrate no acute bony changes.   Severe left hip joint degenerative changes with joint space loss. Severe degenerative changes of the lower lumbar spine.       1. Nonobstructive bowel gas pattern 2. NG tube tip overlying the gastric antrum region.   MACRO: None   Signed by: Katrina Brice 7/16/2024 10:11 PM Dictation workstation:   [id number]    ECG 12 lead    Result Date:  7/16/2024  Atrial fibrillation ST & T wave abnormality, consider inferior ischemia or digitalis effect ST & T wave abnormality, consider anterolateral ischemia or digitalis effect Abnormal ECG When compared with ECG of 15-JUL-2024 11:12, (unconfirmed) Electronic demand pacing is no longer Present ST no longer elevated in Lateral leads Inverted T waves have replaced nonspecific T wave abnormality in Lateral leads QT has shortened    ECG 12 Lead    Result Date: 7/16/2024  Demand pacemaker, interpretation is based on intrinsic rhythm Atrial fibrillation with premature ventricular or aberrantly conducted complexes Nonspecific ST and T wave abnormality , probably digitalis effect Prolonged QT Abnormal ECG When compared with ECG of 08-JUL-2024 17:23, Nonspecific T wave abnormality has replaced inverted T waves in Inferior leads Nonspecific T wave abnormality, worse in Lateral leads    ECG 12 lead    Result Date: 7/16/2024  Demand pacemaker, interpretation is based on intrinsic rhythm Atrial fibrillation with rapid ventricular response with premature ventricular or aberrantly conducted complexes ST & T wave abnormality, consider inferolateral ischemia or digitalis effect Abnormal ECG When compared with ECG of 15-JUL-2024 12:16, (unconfirmed) Electronic demand pacing is now Present ST now depressed in Lateral leads T wave inversion more evident in Inferior leads T wave inversion less evident in Anterior leads T wave inversion more evident in Lateral leads    XR chest 1 view    Result Date: 7/15/2024  Interpreted By:  Mitesh Khan, STUDY: XR CHEST 1 VIEW;  7/15/2024 5:51 pm   INDICATION: Signs/Symptoms:line placement.   COMPARISON: 07/15/2024   ACCESSION NUMBER(S): [id number]   ORDERING CLINICIAN: TEETEE MOONEY   FINDINGS: Left-sided pacemaker in place.     CARDIOMEDIASTINAL SILHOUETTE: There is enlarged of the cardiac silhouette with vascular congestion. No donn edema seen.   LUNGS: There is no consolidation. There  is no effusion.   ABDOMEN: No remarkable upper abdominal findings.   BONES: No acute osseous changes.       1.  Enlarged cardiac silhouette with pulmonary vascular congestion.       MACRO: None   Signed by: Mitesh Khan 7/15/2024 5:55 PM Dictation workstation:   [id number]    CT chest abdomen pelvis wo IV contrast    Addendum Date: 7/15/2024    NOTIFICATION:  The positive results of the study were discussed with, and acknowledged by AMINAH Hill, by telephone on 7/15/2024 at 1612 hours. Signed by Cheko Wise MD    Result Date: 7/15/2024  STUDY: CT Chest, Abdomen, and Pelvis without IV Contrast; 7/15/2024, 13:30 INDICATION: Sepsis, hypotension, hypoglycemia, hypothermia. COMPARISON: CT chest abd 6/28/2024, 6/9/2024, 3/20/2024, 1/14/2024. ACCESSION NUMBER(S): [id number] ORDERING CLINICIAN: ANTONIA ANGLIN TECHNIQUE: CT of the chest, abdomen, and pelvis was performed.  Contiguous axial images were obtained at 3 mm slice thickness through the chest, abdomen, and pelvis.  Coronal and sagittal reconstructions at 3 mm slice thickness were performed.  No intravenous contrast was administered.  FINDINGS: Please note that the evaluation of vessels, lymph nodes and organs is limited without intravenous contrast. CHEST: MEDIASTINUM: Heart is enlarged.  Pacemaker is in place on the left..  Extensive coronary artery calcifications are identified.  LUNGS/PLEURA: There is a new small right pleural effusion..  The airways are patent. There is a faint 9 mm nodule in the right middle lobe, not present on the most recent study.  The nodules that were noted at the right base on the prior study have nearly completely resolved.  There is a 7 mm nodule at the left lung base which was not seen on the prior study. LYMPH NODES: Thoracic lymph nodes are not enlarged. ABDOMEN:  LIVER: No hepatomegaly.  Smooth surface contour.  Normal attenuation.  There is a small amount of ascites around the edge of the liver, not seen on the prior  study.  BILE DUCTS: No intrahepatic or extrahepatic biliary ductal dilatation.  GALLBLADDER: The gallbladder is absent. STOMACH: No abnormalities identified.  PANCREAS: No masses or ductal dilatation.  SPLEEN: No splenomegaly or focal splenic lesion.  ADRENAL GLANDS: No thickening or nodules.  KIDNEYS AND URETERS: Kidneys are normal in size and location.  There is a punctate nonobstructing calculus in the right kidney as seen previously.  PELVIS:  BLADDER: Not well evaluated due to artifact from hip prosthesis.  There is a Montano catheter in place.  REPRODUCTIVE ORGANS: Not seen  BOWEL: There is an area of acute inflammation adjacent to the hepatic flexure of the colon close to several diverticula.  No defined abscess is seen.  This was not present on the prior study.  No appendiceal enlargement or inflammation is seen.  There is diverticulosis of the sigmoid without inflammation.  VESSELS: No abnormalities identified.  Abdominal aorta is normal in caliber.  PERITONEUM/RETROPERITONEUM/LYMPH NODES: There is small to moderate fluid in the cul-de-sac.  No pneumoperitoneum. No lymphadenopathy.  ABDOMINAL WALL: No abnormalities identified. SOFT TISSUES: There is mild edema in the subcutaneous tissues in the lower abdomen and thighs.  BONES: Extensive degenerative changes throughout the lumbar spine, stable. Stable compression fracture of T7.    In comparison to the recent study, a small right effusion has developed in addition to a small amount of ascites. Findings are suspicious for diverticulitis at the hepatic flexure.  No abscess or obvious perforation demonstrated. The nodularity seen at the right base on the prior study has almost completely resolved.  New small nodules are seen in the right middle lobe and left lower lobe.  These are most likely inflammatory given the relatively new appearance of these and the disappearance of other nodules.  Continued follow-up is recommended.. Signed by Cheko Wise MD    XR  chest 1 view    Result Date: 7/15/2024  Interpreted By:  Roni Dwyer, STUDY: XR CHEST 1 VIEW;  7/15/2024 3:43 pm   INDICATION: Signs/Symptoms:new lij cvc, confirm placement and evaluate for ptx.   COMPARISON: Chest x-ray earlier today   ACCESSION NUMBER(S): [id number]   ORDERING CLINICIAN: TEETEE MOONEY   FINDINGS: Left IJ central line tip about 3 cm below the atrial caval junction. Pacemaker still present. The lungs appear clear. No visualized pleural effusion or pneumothorax. Suspect cardiomegaly. Mild pulmonary vascular congestion. Aortic atherosclerosis.       The tip of left IJ central line is in the right atrium. No apparent complication identified post central line placement otherwise.   MACRO: None   Signed by: Roni Dwyer 7/15/2024 3:46 PM Dictation workstation:   [id number]    XR chest 1 view    Result Date: 7/15/2024  Interpreted By:  Jerry Santacruz, STUDY: XR CHEST 1 VIEW;  7/15/2024 11:14 am   INDICATION: Signs/Symptoms:Chest Pain.   COMPARISON: 06/28/2024   ACCESSION NUMBER(S): [id number]   ORDERING CLINICIAN: ANTONIA ANGLIN   FINDINGS: CHEST/LUNGS: The cardiac and mediastinal silhouettes are unchanged in size and configuration. Mild coarsening of the interstitial markings is unchanged. Inspiratory volume is suboptimal which results in crowding of the bronchovascular structures. Atelectasis at the lung bases. No definite new infiltrates. Blunting of the costophrenic angles may relate to trace effusions or pleural thickening.   UPPER ABDOMEN: No remarkable upper abdominal findings.   OSSEOUS STRUCTURES: No acute changes.       Trace effusions versus pleural thickening. No focal consolidation.   MACRO: None.   Signed by: Jerry Santacruz 7/15/2024 11:24 AM Dictation workstation:   [id number]    ECG 12 Lead    Result Date: 7/10/2024  Demand pacemaker, interpretation is based on intrinsic rhythm Atrial fibrillation occasssional fusion and paced beats Moderate voltage criteria for LVH, may be normal variant  Reconfirmed by Claudio Gomez ([id number]) on 7/10/2024 3:10:02 PM    CT head wo IV contrast    Result Date: 7/8/2024  Interpreted By:  aTni Navarro, STUDY: CT HEAD WO IV CONTRAST;  7/8/2024 6:57 pm   INDICATION: Signs/Symptoms:Lethargy.   COMPARISON: Head CT 06/28/2024   ACCESSION NUMBER(S): [id number]   ORDERING CLINICIAN: ANNELISE NEGRETE   TECHNIQUE: Axial noncontrast CT images of the head. Axial noncontrast CT images of the facial bones.   FINDINGS: BRAIN PARENCHYMA:  Subcortical and periventricular foci of hypoattenuation likely reflect sequelae of chronic ischemic microangiopathy. Stable left occipital encephalomalacia (201/14). Chronic calcification in the basal ganglia. Gray-white matter interfaces are preserved. No mass effect or midline shift.   HEMORRHAGE: No acute intracranial hemorrhage. VENTRICLES and EXTRA-AXIAL SPACES: Normal size. EXTRACRANIAL SOFT TISSUES: Within normal limits. PARANASAL SINUSES/MASTOIDS: The visualized paranasal sinuses and mastoid air cells are aerated. CALVARIUM: No depressed skull fracture. No destructive osseous lesion.   OTHER FINDINGS: Degenerative changes of the bilateral TMJ.         No acute intracranial abnormality. If clinical concern persists, consider MRI for further evaluation.   Chronic changes are stable compared to prior and described in the body of the report.     MACRO: None   Signed by: Tani Navarro 7/8/2024 7:58 PM Dictation workstation:   [id number]    ECG 12 lead    Result Date: 7/7/2024  Atrial-paced rhythm with prolonged AV conduction Nonspecific T wave abnormality Abnormal ECG When compared with ECG of 04-JUN-2024 14:40, Electronic atrial pacemaker has replaced Sinus rhythm Nonspecific T wave abnormality has replaced inverted T waves in Inferior leads Confirmed by BRIAN Mackey ([id number]) on 7/7/2024 4:57:13 PM    Electrocardiogram, 12-lead PRN ACS symptoms    Result Date: 7/4/2024  Atrial fibrillation with rapid ventricular response Voltage criteria for left  ventricular hypertrophy ST & T wave abnormality, consider inferolateral ischemia Abnormal ECG When compared with ECG of 30-JUN-2024 04:23, (unconfirmed) Atrial fibrillation has replaced Electronic atrial pacemaker Vent. rate has increased BY  43 BPM T wave inversion less evident in Anterior leads T wave inversion more evident in Lateral leads Confirmed by Cheko Mike ([id number]) on 7/4/2024 9:30:27 PM    ECG 12 Lead    Result Date: 7/1/2024  Electronic atrial pacemaker ST & T wave abnormality, consider inferior ischemia ST & T wave abnormality, consider anterolateral ischemia Abnormal ECG When compared with ECG of 28-JUN-2024 10:48, (unconfirmed) ST now depressed in Inferior leads ST now depressed in Anterior leads ST no longer elevated in Lateral leads Inverted T waves have replaced nonspecific T wave abnormality in Inferior leads T wave inversion now evident in Anterior leads    XR abdomen 1 view    Result Date: 6/30/2024  Interpreted By:  Graciela Blevins, STUDY: XR ABDOMEN 1 VIEW; ;  6/30/2024 9:20 pm   INDICATION: Signs/Symptoms:corpak replacement.   COMPARISON: 06/30/2024 at 6:55 p.m.   ACCESSION NUMBER(S): [id number]   ORDERING CLINICIAN: ARTURO DIAZ   FINDINGS: Single portable radiograph centered on the lower chest and upper abdomen. The feeding tube is in similar position, coursing below the diaphragm with tip in the region of the gastric antrum. No dilated bowel. The lung bases are clear.       Feeding tube courses below the diaphragm with tip in the region of the gastric antrum.     MACRO: None   Signed by: Graciela Blevins 6/30/2024 10:00 PM Dictation workstation:   [id number]    XR abdomen 1 view    Result Date: 6/30/2024  Interpreted By:  Katrina Brice, STUDY: XR ABDOMEN 1 VIEW;  6/30/2024 7:12 pm   INDICATION: Signs/Symptoms:NG tube placement.   COMPARISON: CT study dated 06/09/2024   ACCESSION NUMBER(S): [id number]   ORDERING CLINICIAN: ARTURO DIAZ   FINDINGS: NG tube tip overlies the  gastric body. Nonobstructive bowel gas pattern. Limited evaluation of pneumoperitoneum on supine imaging, however no gross evidence of free air is noted.   Lung bases appear to be clear.   Osseous structures demonstrate no acute bony changes.       1. Nonobstructive bowel gas pattern 2. NG tube tip overlies the gastric body.   MACRO: None   Signed by: Katrina Brice 6/30/2024 8:16 PM Dictation workstation:   [id number]    XR chest 1 view    Result Date: 6/28/2024  Interpreted By:  Katrina Brice, STUDY: Chest, single AP view.   INDICATION: Signs/Symptoms:abnormal labs.   COMPARISON: Chest radiograph 06/04/2024 and CT chest 06/09/2024   ACCESSION NUMBER(S): [id number]   ORDERING CLINICIAN: HERIBERTO ROMERO   FINDINGS: Enlarged cardiac silhouette. Right IJ central venous catheter tip in the cavoatrial junction. Left subclavian AICD/pacemaker device.   No pneumothorax or focal pulmonary consolidation. No sizable pleural effusion. No acute osseous abnormality.       1. Stable cardiomegaly and/or pericardial effusion. 2. Life-support devices positions as above   MACRO: None.   Signed by: Katrina Brice 6/28/2024 1:51 PM Dictation workstation:   [id number]    CT chest abdomen pelvis wo IV contrast    Result Date: 6/28/2024  Interpreted By:  Cielo Gorman, STUDY: CT CHEST ABDOMEN PELVIS WO CONTRAST;  6/28/2024 11:07 am   INDICATION: Signs/Symptoms:sepsis, abd ttp, ams, ibeth.   COMPARISON: CT chest, abdomen and pelvis 06/09/2024.   ACCESSION NUMBER(S): [id number]   ORDERING CLINICIAN: HERIBERTO ROMERO   TECHNIQUE: CT of the chest, abdomen, and pelvis was performed without intravenous contrast.   FINDINGS: CHEST:   Lines/Devices: Stable left subclavian cardiac pacer with leads in the right atrium and right ventricle.   Lungs: The trachea and central airways are patent without endobronchial lesions. New nodular opacities in the right lower lobe up to 17 mm (series 202, image 139). No pleural effusion,  pulmonary edema or pneumothorax.   Vasculature: Minimal thoracic aortic atherosclerotic calcifications without aneurysmal dilation. The main pulmonary artery is normal in size. Severe trivessel coronary artery calcifications.   Heart: Stable biventricular enlargement. No pericardial effusion.   Mediastinum and wilbert: No pathologically enlarged thoracic lymphadenopathy. The esophagus is unremarkable.   Chest wall and lower neck: No acute chest wall soft tissue abnormalities. The visualized thyroid is normal.   ABDOMEN/PELVIS:   Liver: No mass.   Biliary: No intrahepatic or extrahepatic bile duct dilation. Cholecystectomy.   Spleen: No mass. No splenomegaly.   Pancreas: No mass or duct dilation.   Adrenals: Normal.   Kidneys: Suspect punctate nonobstructing right lower pole calculus. No hydronephrosis.   GI tract: No bowel wall thickening or dilation. Normal appendix. Colonic diverticulosis without acute inflammation.   Lymph nodes: No abdominopelvic lymphadenopathy.   Mesentery/peritoneum: No ascites, free air or fluid collection.   Vasculature: Moderate abdominal aortic atherosclerotic calcifications without aneurysmal dilation.   Pelvis: No ascites, free air or fluid collection.   Bones/Soft tissues: No acute rib, pelvic or hip fracture. Unchanged subacute nondisplaced right anterior iliac crest fracture. Multilevel degenerative disc disease. Unchanged mild-to-moderate compression deformities at T7 and L1. Grossly unremarkable right total hip arthroplasty. Severe left hip osteoarthritis.       CHEST: 1. New right lower lobe nodular opacities, likely infectious/inflammatory. Consider follow-up in 3-6 months. 2. No acute abnormality.   ABDOMEN-PELVIS: 1. No abscess or other acute abnormality in the abdomen or pelvis.   MACRO None   Signed by: Cielo Gorman 6/28/2024 12:07 PM Dictation workstation:   [id number]    CT head wo IV contrast    Result Date: 6/28/2024  Interpreted By:  Cielo Gorman, STUDY: CT HEAD WO  IV CONTRAST;  6/28/2024 11:07 am   INDICATION: Signs/Symptoms:AMS.   COMPARISON: CT head 06/09/2024.   ACCESSION NUMBER(S): [id number]   ORDERING CLINICIAN: HERIBERTO ROMERO   TECHNIQUE: Noncontrast axial CT scan of head was performed.   FINDINGS: Parenchyma: No intracranial hemorrhage. The grey-white differentiation is intact. No mass effect or midline shift. Stable left occipital encephalomalacia.   CSF Spaces: The ventricles, sulci and basal cisterns are within normal limits for age.   Extra-Axial Fluid: No extraaxial fluid collection.   Calvarium: No acute fracture.   Paranasal sinuses: Visualized paranasal sinuses are clear.   Mastoids: Clear.   Orbits: No acute abnormality.   Soft tissues: No acute abnormality.       No acute intracranial abnormality.   MACRO None   Signed by: Cielo Gorman 6/28/2024 11:52 AM Dictation workstation:   [id number]          Assessment/Plan     Patient is a 63 y.o. female who was upgraded to  ICU from medicine for hypotension and Afib RVR. Medical history is significant for systemic lupus erythematosus, lupus cerebritis, rheumatoid arthritis, adrenal cortical insufficiency, COPD, diabetes, hypertension, paroxysmal atrial fibrillation (no longer Eliquis due to thrombocytopenia), hyperlipidemia, CKD, Raynaud's syndrome, hyperparathyroidism, pacemaker, seizures, and psychosis.     Neuro: Encephalopathy secondary to hypoperfusion, sepsis  - History: Lupus cerebritis  - Pain: Tylenol  - Home meds: Continue home keppra, sertraline, risperidone until mentation improves  - [facility] ICU     Cardiovascular: Shock secondary to adrenal insufficiency and/or sepsis  - History: Atrial fibrillation no longer on anticoagulation, pacemaker, hypertension, hyperlipidemia  - Goals: MAP> 65  - Home meds: Resume home atorvastatin. Holding lasix  - Last echo: LVEF 40 to 45% (5/2024)  -Norepinephrine drip discontinued 7/16     Pulmonary:   - History: COPD  - Home meds: Holding Airsupra, Mucinex  Continue DuoNeb and formoterol as needed for wheezing  - Continuous pulse oximetry       Gastrointestinal: Diverticulitis at the hepatic flexure  - Resolved  Results from last 7 days   Lab Units 07/21/24  0452 07/20/24  0339 07/19/24  0403 07/18/24  1654 07/18/24  0416 07/17/24  0912 07/16/24  0252 07/15/24  1114   INR   --   --   --  1.0  --  1.1  --  1.0   ALK PHOS U/L 91 95 97  --  104  --    < > 134   AST U/L 16 12 12  --  13  --    < > 34   ALT U/L 11 12 14  --  17  --    < > 27   LIPASE U/L  --   --   --   --   --   --   --  21    < > = values in this interval not displayed.       - Diet: TPN   - GI on consult NGT failure. Poor candidate for PEG tube placement at this time.  - Prophylaxis: Hold home Protonix 40mg daily  - Bowel regimen: None  - Last BM: Last BM Date: 07/19/24     Renal:  Acute kidney injury on chronic kidney disease, suspect secondary to initial hypoperfusion/hypovolemia/acute illness  Results from last 7 days   Lab Units 07/21/24  0452 07/21/24  0111 07/20/24  0339 07/19/24  1958 07/19/24  0403 07/18/24  2216   SODIUM mmol/L 142 142 144 144 146* 147*   POTASSIUM mmol/L 3.6 3.1* 3.6 3.4* 4.0 3.7   MAGNESIUM mg/dL 1.94  --  2.06  --  2.18 2.25   PHOSPHORUS mg/dL 2.4* 2.2* 2.2* 2.9 3.9 4.0   BUN mg/dL 26* 26* 32* 32* 31* 30*   CREATININE mg/dL 1.76* 1.72* 2.01* 2.05* 2.34* 2.37*       Net IO Since Admission: 4,604.52 mL [07/21/24 0643]  - Daily CMP,Mg,Phos  - History: CKD, baseline creatinine 1.4  - [place] with strict I/O   - Electrolytes: Replete per protocol, goal K>4 Phos >3 Mg >2     Endocrine: Adrenal insufficiency  Results from last 7 days   Lab Units 07/21/24  0452 07/21/24  0431 07/21/24  0111 07/20/24  2341 07/20/24 2019 07/20/24  1618 07/20/24  1211 07/20/24  0826 07/15/24  1134 07/15/24  1114   POCT GLUCOSE mg/dL  --  175*  --  182* 185* 166* 180* 182*   < >  --    GLUCOSE mg/dL 178*  --  204*  --   --   --   --   --    < > 49*   TSH mIU/L  --   --   --   --   --   --   --   --   --   4.67*    < > = values in this interval not displayed.      -History: Adrenocortical insufficiency, diabetes  -Home meds: Holding Florinef; administering Solu-Cortef and hydrocortisone  -Endocrinology on consult, Solucortef 25mg q8h  -sliding scale: Insulin lispro  -BG < 180 goal    Rheumatology:  -History: Rheumatoid arthritis  -Holding mycophenolate due to risk of bone marrow suppression    ENT:  -Bilateral epistaxis and nasal packing after Corpak attempt  -ENT on consult, continuing to follow. Left nostril packed with Merocel by ENT, right nostril continues to bleed intermittently. ENT evaluated patient, ok to pack right nostril with smaller rhino rocket and Afrin.    Hematology:   Results from last 7 days   Lab Units 24  0039 24  0338 24  0403 24  1654   HEMOGLOBIN g/dL 7.2* 7.4* 7.5* 8.0*   HEMATOCRIT % 23.8* 23.9* 24.5* 25.7*   PLATELETS AUTO x10*3/uL 93* 62* 68* 73*     - Daily CBC  -History: Chronic thrombocytopenia  - DVT Prophylaxis: Holding due to GI bleed, ok to resume from ENT perspective     Infectious Disease: Diverticulitis  Results from last 7 days   Lab Units 24  0039 24  0338 24  0403 24  1654 24  1430 24  0904 24  0250   WBC AUTO x10*3/uL 12.1* 7.9 4.6 4.5   < > 3.3* 2.8*   SED RATE mm/h  --   --   --   --   --  11  --    CRP mg/dL  --   --   --   --   --   --  4.19*    < > = values in this interval not displayed.     Temp (24hrs), Av °C (96.8 °F), Min:35.4 °C (95.7 °F), Max:36.9 °C (98.4 °F)   -Cultures: Blood pending     Musculoskeletal:   - PT to see   - OT to see     LDA:  Urethral Catheter (Active)   Placement Date/Time: 07/15/24 1300     Number of days: 5         CODE STATUS: DNR and No Intubation    Disposition: Remain in the ICU.    RESTRAINTS: type: None    ABCDEF Checklist  Analgesia: Spontaneous awakening trial to be pursued if clinically appropriate. RASS goal reviewed  Breathing: Spontaneous breathing trial to be  pursued if clinically appropriate. Mechanical power of assisted ventilation reviewed  Choice of analgesia/sedation: Analgesic and sedative agents adjusted per clinical context.   Delirium assessed by CAM, will avoid exacerbating factors  Early mobility and exercise: Physical and occupational therapy engaged  Family: Plan of care, overall trajectory of patient shared with family. Questions elicited and answered as appropriate.     Due to the high probability of life threatening clinical decompensation, the patient required critical care time evaluating and managing this patient.  Critical care time included obtaining a history, examining the patient, ordering and reviewing studies, discussing, developing, and implementing a management plan, evaluating the patient's response to treatment, and discussion with other care team providers. I saw and evaluated the patient myself.  Critical care time was performed exclusive of billable procedures.    Critical care time:       Case discussed with attending , Dr. Lambert.        Donald Busby MD

## 2024-07-21 NOTE — PROGRESS NOTES
Critical Care Daily Progress        Subjective   Patient is a 63 y.o. female admitted on 7/15/2024 10:13 AM to the ICU for shock secondary to adrenal sufficiency and/or sepsis.    Overnight Events:   Patient was downgraded yesterday afternoon, and overnight she went into Afib w/ RVR and was hypotensive. She was given a 500cc bolus of fluids, and 1 dose of Lopressor however continued to have rates sustained in the 140-150s and remained hypotensive. She was transferred to the ICU for higher level of care. Labs were obtained, and electrolytes were repleted.     Scheduled Medications:   atorvastatin, 40 mg, oral, Nightly  budesonide, 0.5 mg, nebulization, BID  [Held by provider] docusate sodium, 100 mg, oral, BID  folic acid, 1 mg, oral, Daily  formoterol, 20 mcg, nebulization, BID  [Held by provider] heparin (porcine), 5,000 Units, subcutaneous, q8h  hydrocortisone sodium succinate, 25 mg, intravenous, q8h  insulin lispro, 0-5 Units, subcutaneous, q4h  ipratropium-albuteroL, 3 mL, nebulization, TID  levETIRAcetam, 500 mg, intravenous, q12h  [Held by provider] melatonin, 5 mg, oral, Nightly  metoprolol tartrate, 6.25 mg, oral, BID  [Held by provider] mycophenolate, 1,000 mg, oral, BID  pantoprazole, 40 mg, intravenous, Daily  piperacillin-tazobactam, 3.375 g, intravenous, q8h  polyethylene glycol, 17 g, oral, Daily  potassium phosphate, 15 mmol, intravenous, Once  risperiDONE, 3 mg, oral, BID  sertraline, 25 mg, oral, Nightly  [Held by provider] sodium chloride, 2 spray, Each Nostril, TID  thiamine, 100 mg, oral, Daily         Continuous Medications:   norepinephrine, 0-0.2 mcg/kg/min, Last Rate: Stopped (07/21/24 0830)             PRN Medications:   PRN medications: acetaminophen, dextrose, dextrose, glucagon, glucagon, ipratropium-albuteroL, magnesium sulfate, oxygen, oxymetazoline, potassium chloride    Objective   Vitals:  Temp  Min: 35.4 °C (95.7 °F)  Max: 36.3 °C (97.3 °F)  Pulse  Min: 72  Max: 154  BP  Min: 63/47   Max: 140/79  Resp  Min: 14  Max: 31  SpO2  Min: 88 %  Max: 100 %    Physical Exam:   Physical Exam   Physical Exam  Vitals and nursing note reviewed.   Constitutional:       Appearance: She is well-developed. She is ill-appearing.   HENT:      Head: Normocephalic and atraumatic.      Right Ear: External ear normal.      Left Ear: External ear normal.      Nose:      Comments: Merocel in left nostril, rhino rocket in right nostril.     Mouth/Throat:      Mouth: Mucous membranes are dry.   Eyes:      General: No scleral icterus.     Extraocular Movements: Extraocular movements intact.      Conjunctiva/sclera: Conjunctivae normal.      Pupils: Pupils are equal, round, and reactive to light.   Cardiovascular:      Rate and Rhythm: Normal rate and regular rhythm.      Heart sounds: No murmur heard.  Pulmonary:      Effort: Pulmonary effort is normal. No respiratory distress.      Breath sounds: Normal breath sounds.   Abdominal:      Palpations: Abdomen is soft.      Tenderness: There is no abdominal tenderness. There is no guarding or rebound.   Musculoskeletal:         General: No swelling.      Cervical back: Neck supple.   Skin:     General: Skin is warm and dry.      Comments: Old, closed skin wounds on lower legs bilaterally. Do not appear infected.   Neurological:      Mental Status: She is alert. She is disoriented.      Comments: Awake, alert, answers questions. Oriented to person, month, and year. Follows commands.                Assessment/Plan   Overall assessment:  Patient is a 63-year-old female admitted to the ICU for shock, suspect secondary to adrenal insufficiency but sepsis from diverticulitis also within the differential.     Neuro: Encephalopathy secondary to hypoperfusion, sepsis  - History: Lupus cerebritis  - Home meds: Continue home Keppra.  Holding home sertraline, risperidone until mentation improves  - [facility] ICU    Cardiovascular: Shock secondary to adrenal insufficiency and/or sepsis  #Afib w/  RVR  - History: Atrial fibrillation no longer on anticoagulation, pacemaker, hypertension, hyperlipidemia  - Goals: MAP> 65  - Home meds: Resume home atorvastatin  - Last echo: LVEF 40 to 45% (5/2024)  -Norepinephrine drip restarted but then weaned off  -Continue Solucortef  -Home metoprolol held due to underlying sepsis; will start metoprolol 6.25mg BID for rate control  -Continue to optimize electrolytes       Pulmonary:   #AHRF 2/2 pulmonary edema  FiO2 (%):  [40 %] 40 %   - History: COPD  - Home meds: Continue DuoNeb and Pulmicort as needed for wheezing  Continuous pulse oximetry   O2 PRN to maintain SpO2 > 94%, wean as tolerated  - Imaging: Chest X-ray 7/18 shows pulmonary edema and minimal-small pleural effusions. Infectious process cannot be ruled out, but clinically less likely to be a pneumonia - already on Zosyn. No fevers or hemodynamic instability.    Gastrointestinal: Diverticulitis at the hepatic flexure   Results from last 7 days   Lab Units 07/21/24  0452 07/20/24  0339 07/19/24  0403 07/18/24  1654 07/18/24  0416 07/17/24  0912 07/16/24  0252 07/15/24  1114   INR   --   --   --  1.0  --  1.1  --  1.0   ALK PHOS U/L 91 95 97  --  104  --    < > 134   AST U/L 16 12 12  --  13  --    < > 34   ALT U/L 11 12 14  --  17  --    < > 27   LIPASE U/L  --   --   --   --   --   --   --  21    < > = values in this interval not displayed.       - Diet: Pureed diet, passed swallow eval with SLP  - GI on consult for diverticulitis. Continue supportive care. Outpatient colonoscopy in approximately six weeks if appropriate for goals of care. Poor candidate for PEG tube placement at this time.  - GI reconsulted for the GI bleed. Poor candidate for colonoscopy at this time given the inability to complete a bowel prep and active diverticulitis.  - Prophylaxis: Continue home Protonix 40mg daily  - Bowel regimen: None  - Last BM: Last BM Date: 07/21/24    Renal:  #Acute kidney injury on chronic kidney disease, suspect  secondary to initial hypoperfusion/hypovolemia/acute illness - improving  #Hypernatremia - resolved  Results from last 7 days   Lab Units 07/21/24  0705 07/21/24  0452 07/21/24  0111 07/20/24  0339 07/19/24  1958 07/19/24  0403   SODIUM mmol/L 141 142 142 144   < > 146*   POTASSIUM mmol/L 3.8 3.6 3.1* 3.6   < > 4.0   MAGNESIUM mg/dL 2.21 1.94  --  2.06  --  2.18   PHOSPHORUS mg/dL 2.4* 2.4* 2.2* 2.2*   < > 3.9   BUN mg/dL 26* 26* 26* 32*   < > 31*   CREATININE mg/dL 1.77* 1.76* 1.72* 2.01*   < > 2.34*    < > = values in this interval not displayed.       Net IO Since Admission: 4,938.17 mL [07/21/24 1452]  - Daily CMP,Mg,Phos  - History: CKD, baseline creatinine 1.4  - Remove ruano  - FeNa 0.4%, more consistent with pre-renal  - Nephrology on consult for persistent JED  - IV Lasix 20mg once today, ok for pulses of Lasix if needed for fluid overload  - Electrolytes: Replete per protocol, goal K>4 Phos >3 Mg >2    Endocrine: #Adrenal insufficiency  Results from last 7 days   Lab Units 07/21/24  1313 07/21/24  0735 07/21/24  0705 07/21/24  0452 07/21/24  0431 07/21/24  0111 07/20/24  2341 07/20/24  2019 07/20/24  1618 07/15/24  1134 07/15/24  1114   POCT GLUCOSE mg/dL 219* 181*  --   --  175*  --  182* 185* 166*   < >  --    GLUCOSE mg/dL  --   --  203* 178*  --    < >  --   --   --    < > 49*   TSH mIU/L  --   --   --   --   --   --   --   --   --   --  4.67*    < > = values in this interval not displayed.      -History: Adrenocortical insufficiency, diabetes  -Home meds: Holding Florinef and hydrocortisone, administering Solu-Cortef  -Endocrinology on consult, Solucortef 25mg q8h and will continue to wean with goal of resuming home dose of Florinef and Cortef  -sliding scale: Insulin lispro  -BG < 180 goal    Rheumatology:  -History: Rheumatoid arthritis  -Holding mycophenolate due to risk of bone marrow suppression  -Discussed w/ patient's rheumatologist, Dr. Hoyos, in [place] on 7/18. Ok to stop hold Cellcept  in the setting of acute illness. Limited ability to evaluate the patient over the phone. Patient has not seen her outpatient in at least a year. We have a lower suspicion for acute flare of lupus at this time.  -CRP elevated at 4.19 but ESR normal at 11.  -Plan to resume Cellcept on Monday following completion of abx    ENT:  -Bilateral epistaxis and nasal packing after Corpak attempt  -ENT on consult, continuing to follow. Left nostril packed with Merocel by ENT, right nostril packed with smaller rhino rocket and Afrin. Unable to posteriorly pack bilaterally due to hypoxia. Ok to use Afrin longer than three days.  -Patient already on Zosyn  -s/p 1u of platelets for epistaxis ()    Hematology:  Results from last 7 days   Lab Units 24  0658 24  0039 24  0338 24  0403   HEMOGLOBIN g/dL 7.8* 7.2* 7.4* 7.5*   HEMATOCRIT % 26.9* 23.8* 23.9* 24.5*   PLATELETS AUTO x10*3/uL 99* 93* 62* 68*     - Daily CBC  -History: Chronic thrombocytopenia  - DVT Prophylaxis: Holding due to GI bleed, ok to resume from ENT perspective    Infectious Disease: Diverticulitis  Results from last 7 days   Lab Units 24  0658 24  0039 24  0338 24  0403 24  1430 24  0904 24  0250   WBC AUTO x10*3/uL 13.8* 12.1* 7.9 4.6   < > 3.3* 2.8*   SED RATE mm/h  --   --   --   --   --  11  --    CRP mg/dL  --   --   --   --   --   --  4.19*    < > = values in this interval not displayed.     Temp (24hrs), Av.8 °C (96.4 °F), Min:35.4 °C (95.7 °F), Max:36.3 °C (97.3 °F)     -Cultures: Repeat cultures ordered  -Hx E. Faecium UTI, urine culture negative on this admission  -Abx: Zosyn x7 days for diverticulitis (stop ).  -Stool pathogen and C diff negative    Musculoskeletal:   - PT to see   - OT to see       LDA:  CVC 07/15/24 Triple lumen Left Internal jugular (Active)   Placement Date/Time: 07/15/24 1500   Hand Hygiene Performed Prior to CVC Insertion: Yes  Site Prep: Chlorhexidine    Site Prep Agent has Completely Dried Before Insertion: Yes  All 5 Sterile Barriers Used (Gloves, Gown, Cap, Mask, Large Sterile Drape):...   Number of days: 0       Arterial Line 07/15/24 Right Radial (Active)   Placement Date/Time: 07/15/24 (c) 2224   Size: 20 G  Orientation: Right  Location: Radial  Site Prep: Chlorhexidine   Local Anesthetic: Injectable  Insertion attempts: 1  Securement Method: Transparent dressing;Taped   Number of days: 0       Urethral Catheter (Active)   Placement Date/Time: 07/15/24 1300     Number of days: 0         CODE STATUS: DNR and No Intubation    Disposition: ICU    RESTRAINTS: type: None    ABCDEF Checklist  Analgesia: Spontaneous awakening trial to be pursued if clinically appropriate. RASS goal reviewed  Breathing: Spontaneous breathing trial to be pursued if clinically appropriate. Mechanical power of assisted ventilation reviewed  Choice of analgesia/sedation: Analgesic and sedative agents adjusted per clinical context.   Delirium assessed by CAM, will avoid exacerbating factors  Early mobility and exercise: Physical and occupational therapy engaged  Family: Plan of care, overall trajectory of patient shared with family. Questions elicited and answered as appropriate.     Due to the high probability of life threatening clinical decompensation, the patient required critical care time evaluating and managing this patient.  Critical care time included obtaining a history, examining the patient, ordering and reviewing studies, discussing, developing, and implementing a management plan, evaluating the patient's response to treatment, and discussion with other care team providers. I saw and evaluated the patient myself.  Critical care time was performed exclusive of billable procedures.      Case discussed with attending , Dr. Fausto Martinez DO  PGY-3 Internal Medicine

## 2024-07-21 NOTE — PROGRESS NOTES
INPATIENT NEPHROLOGY CONSULT PROGRESS NOTE      Patient Name: Bing Holliday MRN: [id number]  DATE of SERVICE: July 21, 2024  TIME of SERVICE: 03:44 PM  CONSULTING SERVICE: Nephrology    REASON for CONSULT: JED    SUBJECTIVE:  Patient seen and evaluated at bedside remains sick in the intensive care unit more sleepy today.  Nasal packing in place.  Patient oral intake has been poor today.  Renal parameters stable creatinine 1.8 mg deciliter EGFR 31.  Electrolytes within range sodium level at 141.    SUMMARY OF STAY:  Ms. Holliday is a 63 y.o. female who presented to [facility] 15, 2024 for evaluation of weakness noted at skilled nursing facility.  Diagnosed with septic shock, acute diverticulitis, acute diverticular bleed and admitted to the intensive care unit.  Patient with complex past medical history including lupus nephritis class V in 2011, systemic lupus erythematosus with chronic kidney disease stage IIIb,  Cerebritis and arthropathy, uncontrolled type 2 diabetes mellitus, paroxysmal atrial fibrillation, heart failure with reduced ejection fraction, coronary artery disease status post CABG 2013, COPD, pulmonary hypertension, hypertension, hyperlipidemia, GERD.    ASSESSMENT:  Acute kidney injury superimposed on chronic kidney disease stage IIIb:  -- Acute kidney injury likely ischemic related ATN in the setting of septic shock and acute GI bleed, peak serum creatinine up to 2.6 mg deciliter with drop in GFR to 20 mill per minute per 1.73 m²  --From baseline serum creatinine of 1.5 and EGFR of 40 mill per minute per 1.73 m²  --Immunocompromised host with pancytopenia  --Acute diverticulitis with diverticular bleed, s/p blood transfusion.   --Montano catheter in place no signs of obstruction  --On background CKD 3B, lupus nephritis class V  --Code status DNR/DNI no dialysis   --Patient reports that her mother passed During hemodialysis session  and she does not wish to be on hemodialysis.     Septic shock secondary to acute diverticulitis:   -- Was hypothermic on presentation  -- Shock resolved, off pressor support     Acute hypoxic respiratory failure requiring intermittent Venturi mask     Volume: Tendency to third space due to hypoalbuminemia, CKD, diabetes patient with underlying abdominal situs.  -- Chest x-ray with findings concerning for pulmonary edema  -- Intermittent volume expansion with oncotic solution to maintain hemodynamic stability.     Acute blood loss anemia, GI team following patient at high risk to have procedure.     Immunocompromised     Systemic lupus erythematosus managed with mycophenolate and prednisone.     Adrenal insufficiency: On hydrocortisone 50 mg 3 times daily.     Pancytopenia     Pulmonary hypertension     Paroxysmal atrial fibrillation, rate controlled       PLAN:  Acute kidney injury superimposed on chronic kidney disease stage IIIb, acute kidney injury multifactorial component of ischemic related ATN, sepsis related ATN.  Currently with mild volume overload/venous congestion Post resuscitation improving, pt auto-diuresing.      Oral intake marginal as well as urine output.  Plan of care discussed with ICU team, truly appreciate recommendations, tentative plan to discuss with GI team again regarding enteral tube feed recommendations.   status post furosemide and platelet transfusion yesterday.   Epistaxis under control if persist then may consider adding desmopressin.   To cont protein supplements 3 times daily.  Strict input and output to guide volume management.     Will follow, thank you!    Medications:    Current Facility-Administered Medications:     acetaminophen (Tylenol) tablet 650 mg, 650 mg, oral, q4h PRN, Donald Busby MD    Adult Clinimix Parenteral Nutrition, 83 mL/hr, intravenous, Daily PN, Steve Martinez,     atorvastatin (Lipitor) tablet 40 mg, 40 mg, oral, Nightly, Donald Busby MD, 40  mg at 07/20/24 2026    budesonide (Pulmicort) 0.5 mg/2 mL nebulizer solution 0.5 mg, 0.5 mg, nebulization, BID, Donald Busby MD, 0.5 mg at 07/21/24 0856    dextrose 50 % injection 12.5 g, 12.5 g, intravenous, q15 min PRN, Donald Busby MD    dextrose 50 % injection 25 g, 25 g, intravenous, q15 min PRN, Donald Busby MD    [Held by provider] docusate sodium (Colace) capsule 100 mg, 100 mg, oral, BID, Cheko Angel DO    folic acid (Folvite) tablet 1 mg, 1 mg, oral, Daily, Donald Busby MD, 1 mg at 07/21/24 0904    formoterol (Perforomist) 20 mcg/2 mL nebulizer solution 20 mcg, 20 mcg, nebulization, BID, Donald Busby MD, 20 mcg at 07/21/24 0856    glucagon (Glucagen) injection 1 mg, 1 mg, intramuscular, q15 min PRN, Donald Busby MD    glucagon (Glucagen) injection 1 mg, 1 mg, intramuscular, q15 min PRN, Donald Busby MD    [Held by provider] heparin (porcine) injection 5,000 Units, 5,000 Units, subcutaneous, q8h, Cheko Angel DO, 5,000 Units at 07/16/24 2349    hydrocortisone sod succ (PF) (Solu-CORTEF) injection 25 mg, 25 mg, intravenous, q8h, Donald Busby MD, 25 mg at 07/21/24 1751    insulin lispro (HumaLOG) injection 0-5 Units, 0-5 Units, subcutaneous, q4h, Donald Busby MD, 1 Units at 07/17/24 0824    ipratropium-albuteroL (Duo-Neb) 0.5-2.5 mg/3 mL nebulizer solution 3 mL, 3 mL, nebulization, q4h PRN, Donald Busby MD    ipratropium-albuteroL (Duo-Neb) 0.5-2.5 mg/3 mL nebulizer solution 3 mL, 3 mL, nebulization, TID, Donald Busby MD, 3 mL at 07/21/24 1705    levETIRAcetam (Keppra) 500 mg in sodium chloride (iso)  mL, 500 mg, intravenous, q12h, Donald Busby MD, Stopped at 07/21/24 0753    magnesium sulfate 2 g in sterile water for injection 50 mL, 2 g, intravenous, q6h PRN, Donald Busby MD, Stopped at 07/18/24 0800    [Held by provider] melatonin tablet 5 mg, 5 mg, oral, Nightly, Cheko Angel DO    metoprolol  tartrate (Lopressor) tablet 6.25 mg, 6.25 mg, oral, BID, Krista Lambert MD, 6.25 mg at 07/21/24 1403    [Held by provider] mycophenolate (Cellcept) capsule 1,000 mg, 1,000 mg, oral, BID, Cheko Angel DO    norepinephrine (Levophed) 8 mg in dextrose 5% 250 mL (0.032 mg/mL) infusion (premix), 0-0.2 mcg/kg/min, intravenous, Continuous, Aneesh Bennett DO, Stopped at 07/21/24 0830    oxygen (O2) therapy, , inhalation, Continuous PRN - O2/gases, Donald Busby MD, 30 percent at 07/20/24 0850    oxymetazoline (Afrin) 0.05 % nasal spray 2 spray, 2 spray, Each Nostril, q12h PRN, Donald Busby MD    pantoprazole (ProtoNix) injection 40 mg, 40 mg, intravenous, Daily, Donald Busby MD, 40 mg at 07/21/24 0848    piperacillin-tazobactam (Zosyn) 3.375 g in dextrose (iso) IV 50 mL, 3.375 g, intravenous, q8h, Donald Busby MD, Stopped at 07/21/24 1518    polyethylene glycol (Glycolax, Miralax) packet 17 g, 17 g, oral, Daily, Donald Busby MD, 17 g at 07/21/24 0848    potassium chloride 40 mEq in sterile water for injection 100 mL, 40 mEq, intravenous, q6h PRN, Donald Busby MD    risperiDONE (RisperDAL) tablet 3 mg, 3 mg, oral, BID, Donald Busby MD, 3 mg at 07/21/24 0904    sertraline (Zoloft) tablet 25 mg, 25 mg, oral, Nightly, Donald Busby MD, 25 mg at 07/20/24 2027    [Held by provider] sodium chloride (Ocean) 0.65 % nasal spray 2 spray, 2 spray, Each Nostril, TID, Cheko Angel DO    thiamine (Vitamin B-1) tablet 100 mg, 100 mg, oral, Daily, Donald Busby MD, 100 mg at 07/21/24 0904    PERTINENT ROS:  GENERAL:  positive for fatigue, poor appetite.  No fever/chills  RESPIRATORY:  positive for shortness of breath.  Negative for cough, wheezing.  CARDIOVASCULAR:   Negative for chest pain or palpitation.  GI:  Negative for abdominal pain, diarrhea, heartburn, nausea, vomiting  : + ruano     Physical Exam:  Vital signs in last 24 hours:  Temp:  [35.4 °C (95.7  °F)-36.3 °C (97.3 °F)] 35.9 °C (96.6 °F)  Heart Rate:  [] 78  Resp:  [10-31] 19  BP: ()/(41-79) 101/58    General: Awake, on venturi mask  HEENT:  + nasal packing   NECK:  no  JVD, no carotid bruit, supple, no cervical mass or thyromegaly  LUNGS;  Diminished breath sounds, no Rales  CV:  Distant, regular rate and rhythm, no murmurs  ABDOMEN:  abdomen soft, nontender, BS normal, no masses or organomegaly  EDEMA:  no lower extremity edema/dependent edema  SKIN:  dry and normal turgor, no clubbing, cyanosis or petechia.  No rashes noted  : + ruano     Intake/Output last 3 shifts:  I/O last 3 completed shifts:  In: 2842.7 (29.1 mL/kg) [P.O.:1090; I.V.:752.7 (7.7 mL/kg); IV Piggyback:1000]  Out: 1840 (18.9 mL/kg) [Urine:1840 (0.5 mL/kg/hr)]  Weight: 97.6 kg     DATA:  Diagnotic tests reviewed for Todays visit:  Results from last 7 days   Lab Units 07/21/24  0658   WBC AUTO x10*3/uL 13.8*   RBC AUTO x10*6/uL 2.71*   HEMOGLOBIN g/dL 7.8*   HEMATOCRIT % 26.9*     Results from last 7 days   Lab Units 07/21/24  1748 07/21/24  0705 07/21/24  0452   SODIUM mmol/L 141   < > 142   POTASSIUM mmol/L 4.0   < > 3.6   CHLORIDE mmol/L 109*   < > 111*   CO2 mmol/L 24   < > 21   BUN mg/dL 27*   < > 26*   CREATININE mg/dL 1.85*   < > 1.76*   CALCIUM mg/dL 7.7*   < > 7.9*   PHOSPHORUS mg/dL 3.5   < > 2.4*   MAGNESIUM mg/dL 1.98   < > 1.94   BILIRUBIN TOTAL mg/dL  --   --  0.7   ALT U/L  --   --  11   AST U/L  --   --  16    < > = values in this interval not displayed.     Results from last 7 days   Lab Units 07/21/24  1621   COLOR U  Yellow   APPEARANCE U  Turbid*   PH U  5.5   SPEC GRAV UR  1.021   PROTEIN U mg/dL 70 (1+)*   BLOOD UR  0.2 (2+)*   NITRITE U  NEGATIVE   WBC UR /HPF >50*     IMAGING: CXR reviewed in [facility] images      SIGNATURE: Vikram Perez MD  Nephrology and Hypertension  [address] [place]  Office phone: [phone]  FAX: [phone]    This note was partially generated using the Dragon  voice recognition system, and there may be some incorrect words, spelling's and punctuation that were not noted in checking the note before saving.

## 2024-07-22 PROBLEM — T68.XXXA HYPOTHERMIA, INITIAL ENCOUNTER: Status: RESOLVED | Noted: 2024-07-15 | Resolved: 2024-07-22

## 2024-07-22 LAB
ABO GROUP (TYPE) IN BLOOD: NORMAL
ALBUMIN SERPL BCP-MCNC: 2.4 G/DL (ref 3.4–5)
ALP SERPL-CCNC: 77 U/L (ref 33–136)
ALT SERPL W P-5'-P-CCNC: 9 U/L (ref 7–45)
ANION GAP SERPL CALC-SCNC: 10 MMOL/L (ref 10–20)
ANTIBODY SCREEN: NORMAL
AST SERPL W P-5'-P-CCNC: 13 U/L (ref 9–39)
BASOPHILS # BLD MANUAL: 0 X10*3/UL (ref 0–0.1)
BASOPHILS NFR BLD MANUAL: 0 %
BILIRUB SERPL-MCNC: 0.5 MG/DL (ref 0–1.2)
BLOOD EXPIRATION DATE: NORMAL
BUN SERPL-MCNC: 28 MG/DL (ref 6–23)
C3 SERPL-MCNC: 112 MG/DL (ref 87–200)
C4 SERPL-MCNC: 50 MG/DL (ref 10–50)
CALCIUM SERPL-MCNC: 7.7 MG/DL (ref 8.6–10.3)
CHLORIDE SERPL-SCNC: 108 MMOL/L (ref 98–107)
CO2 SERPL-SCNC: 23 MMOL/L (ref 21–32)
CREAT SERPL-MCNC: 1.71 MG/DL (ref 0.5–1.05)
CRP SERPL-MCNC: 2.57 MG/DL
DISPENSE STATUS: NORMAL
DSDNA AB SER-ACNC: <1 IU/ML
EGFRCR SERPLBLD CKD-EPI 2021: 34 ML/MIN/1.73M*2
EOSINOPHIL # BLD MANUAL: 0 X10*3/UL (ref 0–0.7)
EOSINOPHIL NFR BLD MANUAL: 0 %
ERYTHROCYTE [DISTWIDTH] IN BLOOD BY AUTOMATED COUNT: 20.5 % (ref 11.5–14.5)
ERYTHROCYTE [DISTWIDTH] IN BLOOD BY AUTOMATED COUNT: 20.8 % (ref 11.5–14.5)
ERYTHROCYTE [SEDIMENTATION RATE] IN BLOOD BY WESTERGREN METHOD: 2 MM/H (ref 0–30)
GIANT PLATELETS BLD QL SMEAR: ABNORMAL
GLUCOSE BLD MANUAL STRIP-MCNC: 209 MG/DL (ref 74–99)
GLUCOSE BLD MANUAL STRIP-MCNC: 211 MG/DL (ref 74–99)
GLUCOSE BLD MANUAL STRIP-MCNC: 223 MG/DL (ref 74–99)
GLUCOSE BLD MANUAL STRIP-MCNC: 227 MG/DL (ref 74–99)
GLUCOSE BLD MANUAL STRIP-MCNC: 281 MG/DL (ref 74–99)
GLUCOSE BLD MANUAL STRIP-MCNC: 365 MG/DL (ref 74–99)
GLUCOSE SERPL-MCNC: 237 MG/DL (ref 74–99)
HCT VFR BLD AUTO: 21.2 % (ref 36–46)
HCT VFR BLD AUTO: 26.3 % (ref 36–46)
HGB BLD-MCNC: 6.5 G/DL (ref 12–16)
HGB BLD-MCNC: 8.1 G/DL (ref 12–16)
HOLD SPECIMEN: NORMAL
IMM GRANULOCYTES # BLD AUTO: 0.84 X10*3/UL (ref 0–0.7)
IMM GRANULOCYTES NFR BLD AUTO: 6.8 % (ref 0–0.9)
LDH SERPL L TO P-CCNC: 313 U/L (ref 84–246)
LYMPHOCYTES # BLD MANUAL: 0.86 X10*3/UL (ref 1.2–4.8)
LYMPHOCYTES NFR BLD MANUAL: 7 %
MAGNESIUM SERPL-MCNC: 1.97 MG/DL (ref 1.6–2.4)
MCH RBC QN AUTO: 28.5 PG (ref 26–34)
MCH RBC QN AUTO: 29.7 PG (ref 26–34)
MCHC RBC AUTO-ENTMCNC: 30.7 G/DL (ref 32–36)
MCHC RBC AUTO-ENTMCNC: 30.8 G/DL (ref 32–36)
MCV RBC AUTO: 93 FL (ref 80–100)
MCV RBC AUTO: 97 FL (ref 80–100)
METAMYELOCYTES # BLD MANUAL: 0.37 X10*3/UL
METAMYELOCYTES NFR BLD MANUAL: 3 %
MONOCYTES # BLD MANUAL: 0.49 X10*3/UL (ref 0.1–1)
MONOCYTES NFR BLD MANUAL: 4 %
MYELOCYTES # BLD MANUAL: 0.49 X10*3/UL
MYELOCYTES NFR BLD MANUAL: 4 %
NEUTROPHILS # BLD MANUAL: 10.09 X10*3/UL (ref 1.2–7.7)
NEUTS BAND # BLD MANUAL: 0.86 X10*3/UL (ref 0–0.7)
NEUTS BAND NFR BLD MANUAL: 7 %
NEUTS SEG # BLD MANUAL: 9.23 X10*3/UL (ref 1.2–7)
NEUTS SEG NFR BLD MANUAL: 75 %
NRBC BLD-RTO: 2.8 /100 WBCS (ref 0–0)
NRBC BLD-RTO: 3.1 /100 WBCS (ref 0–0)
PHOSPHATE SERPL-MCNC: 3.5 MG/DL (ref 2.5–4.9)
PLATELET # BLD AUTO: 72 X10*3/UL (ref 150–450)
PLATELET # BLD AUTO: 85 X10*3/UL (ref 150–450)
POTASSIUM SERPL-SCNC: 4.1 MMOL/L (ref 3.5–5.3)
PROCALCITONIN SERPL-MCNC: 0.23 NG/ML
PROCALCITONIN SERPL-MCNC: 0.26 NG/ML
PRODUCT BLOOD TYPE: 5100
PRODUCT CODE: NORMAL
PROT SERPL-MCNC: 4.6 G/DL (ref 6.4–8.2)
RBC # BLD AUTO: 2.19 X10*6/UL (ref 4–5.2)
RBC # BLD AUTO: 2.84 X10*6/UL (ref 4–5.2)
RBC MORPH BLD: ABNORMAL
RH FACTOR (ANTIGEN D): NORMAL
SODIUM SERPL-SCNC: 137 MMOL/L (ref 136–145)
TOTAL CELLS COUNTED BLD: 100
UNIT ABO: NORMAL
UNIT NUMBER: NORMAL
UNIT RH: NORMAL
UNIT VOLUME: 350
WBC # BLD AUTO: 12.3 X10*3/UL (ref 4.4–11.3)
WBC # BLD AUTO: 13.7 X10*3/UL (ref 4.4–11.3)
XM INTEP: NORMAL

## 2024-07-22 PROCEDURE — 85652 RBC SED RATE AUTOMATED: CPT

## 2024-07-22 PROCEDURE — 86160 COMPLEMENT ANTIGEN: CPT | Mod: STJLAB

## 2024-07-22 PROCEDURE — 36430 TRANSFUSION BLD/BLD COMPNT: CPT

## 2024-07-22 PROCEDURE — 85613 RUSSELL VIPER VENOM DILUTED: CPT | Mod: STJLAB

## 2024-07-22 PROCEDURE — 2500000004 HC RX 250 GENERAL PHARMACY W/ HCPCS (ALT 636 FOR OP/ED)

## 2024-07-22 PROCEDURE — 85027 COMPLETE CBC AUTOMATED: CPT

## 2024-07-22 PROCEDURE — 94640 AIRWAY INHALATION TREATMENT: CPT

## 2024-07-22 PROCEDURE — 83010 ASSAY OF HAPTOGLOBIN QUANT: CPT

## 2024-07-22 PROCEDURE — 2060000001 HC INTERMEDIATE ICU ROOM DAILY

## 2024-07-22 PROCEDURE — C9113 INJ PANTOPRAZOLE SODIUM, VIA: HCPCS

## 2024-07-22 PROCEDURE — P9016 RBC LEUKOCYTES REDUCED: HCPCS

## 2024-07-22 PROCEDURE — 2500000005 HC RX 250 GENERAL PHARMACY W/O HCPCS: Performed by: INTERNAL MEDICINE

## 2024-07-22 PROCEDURE — 99291 CRITICAL CARE FIRST HOUR: CPT

## 2024-07-22 PROCEDURE — 2500000001 HC RX 250 WO HCPCS SELF ADMINISTERED DRUGS (ALT 637 FOR MEDICARE OP): Performed by: INTERNAL MEDICINE

## 2024-07-22 PROCEDURE — 2500000002 HC RX 250 W HCPCS SELF ADMINISTERED DRUGS (ALT 637 FOR MEDICARE OP, ALT 636 FOR OP/ED)

## 2024-07-22 PROCEDURE — 82947 ASSAY GLUCOSE BLOOD QUANT: CPT

## 2024-07-22 PROCEDURE — 83615 LACTATE (LD) (LDH) ENZYME: CPT

## 2024-07-22 PROCEDURE — 80053 COMPREHEN METABOLIC PANEL: CPT

## 2024-07-22 PROCEDURE — 36415 COLL VENOUS BLD VENIPUNCTURE: CPT | Performed by: NURSE PRACTITIONER

## 2024-07-22 PROCEDURE — 86225 DNA ANTIBODY NATIVE: CPT | Mod: STJLAB

## 2024-07-22 PROCEDURE — 85007 BL SMEAR W/DIFF WBC COUNT: CPT

## 2024-07-22 PROCEDURE — 86920 COMPATIBILITY TEST SPIN: CPT

## 2024-07-22 PROCEDURE — 36415 COLL VENOUS BLD VENIPUNCTURE: CPT

## 2024-07-22 PROCEDURE — 83735 ASSAY OF MAGNESIUM: CPT

## 2024-07-22 PROCEDURE — 86901 BLOOD TYPING SEROLOGIC RH(D): CPT | Performed by: NURSE PRACTITIONER

## 2024-07-22 PROCEDURE — 2500000001 HC RX 250 WO HCPCS SELF ADMINISTERED DRUGS (ALT 637 FOR MEDICARE OP)

## 2024-07-22 PROCEDURE — 84100 ASSAY OF PHOSPHORUS: CPT

## 2024-07-22 PROCEDURE — 86140 C-REACTIVE PROTEIN: CPT

## 2024-07-22 RX ORDER — PANTOPRAZOLE SODIUM 40 MG/10ML
40 INJECTION, POWDER, LYOPHILIZED, FOR SOLUTION INTRAVENOUS DAILY
Start: 2024-07-23 | End: 2024-08-02 | Stop reason: HOSPADM

## 2024-07-22 RX ORDER — POLYETHYLENE GLYCOL 3350 17 G/17G
17 POWDER, FOR SOLUTION ORAL DAILY
Start: 2024-07-23

## 2024-07-22 RX ORDER — METOPROLOL TARTRATE 25 MG/1
6.25 TABLET, FILM COATED ORAL 2 TIMES DAILY
Start: 2024-07-22

## 2024-07-22 RX ORDER — IPRATROPIUM BROMIDE AND ALBUTEROL SULFATE 2.5; .5 MG/3ML; MG/3ML
3 SOLUTION RESPIRATORY (INHALATION) 3 TIMES DAILY
Start: 2024-07-22 | End: 2024-08-02 | Stop reason: HOSPADM

## 2024-07-22 RX ORDER — DEXTROSE 50 % IN WATER (D50W) INTRAVENOUS SYRINGE
25
Start: 2024-07-22 | End: 2024-08-02 | Stop reason: HOSPADM

## 2024-07-22 RX ORDER — ATORVASTATIN CALCIUM 40 MG/1
40 TABLET, FILM COATED ORAL NIGHTLY
Start: 2024-07-22

## 2024-07-22 RX ORDER — RISPERIDONE 3 MG/1
3 TABLET ORAL 2 TIMES DAILY
Start: 2024-07-22 | End: 2024-08-02 | Stop reason: HOSPADM

## 2024-07-22 RX ORDER — IPRATROPIUM BROMIDE AND ALBUTEROL SULFATE 2.5; .5 MG/3ML; MG/3ML
3 SOLUTION RESPIRATORY (INHALATION) EVERY 4 HOURS PRN
Qty: 180 ML | Refills: 11 | Status: SHIPPED | OUTPATIENT
Start: 2024-07-22

## 2024-07-22 RX ORDER — DEXTROSE 50 % IN WATER (D50W) INTRAVENOUS SYRINGE
12.5
Start: 2024-07-22 | End: 2024-08-02 | Stop reason: HOSPADM

## 2024-07-22 RX ORDER — BUDESONIDE 0.5 MG/2ML
0.5 INHALANT ORAL
Start: 2024-07-22

## 2024-07-22 RX ORDER — ACETAMINOPHEN 500 MG
5 TABLET ORAL NIGHTLY
Start: 2024-07-22

## 2024-07-22 RX ORDER — INSULIN LISPRO 100 [IU]/ML
0-5 INJECTION, SOLUTION INTRAVENOUS; SUBCUTANEOUS EVERY 4 HOURS
Start: 2024-07-22 | End: 2024-07-24 | Stop reason: HOSPADM

## 2024-07-22 RX ORDER — FUROSEMIDE 40 MG/1
40 TABLET ORAL DAILY
Status: DISCONTINUED | OUTPATIENT
Start: 2024-07-22 | End: 2024-07-27

## 2024-07-22 RX ORDER — LEVETIRACETAM 5 MG/ML
500 INJECTION INTRAVASCULAR EVERY 12 HOURS
Start: 2024-07-22 | End: 2024-08-02 | Stop reason: HOSPADM

## 2024-07-22 RX ORDER — AMOXICILLIN AND CLAVULANATE POTASSIUM 875; 125 MG/1; MG/1
1 TABLET, FILM COATED ORAL EVERY 12 HOURS SCHEDULED
Status: DISCONTINUED | OUTPATIENT
Start: 2024-07-22 | End: 2024-07-25

## 2024-07-22 RX ORDER — MYCOPHENOLATE MOFETIL 250 MG/1
1000 CAPSULE ORAL 2 TIMES DAILY
Start: 2024-07-22

## 2024-07-22 RX ORDER — AMOXICILLIN AND CLAVULANATE POTASSIUM 875; 125 MG/1; MG/1
1 TABLET, FILM COATED ORAL EVERY 12 HOURS SCHEDULED
Start: 2024-07-22 | End: 2024-07-25 | Stop reason: HOSPADM

## 2024-07-22 ASSESSMENT — COGNITIVE AND FUNCTIONAL STATUS - GENERAL
MOVING FROM LYING ON BACK TO SITTING ON SIDE OF FLAT BED WITH BEDRAILS: TOTAL
EATING MEALS: TOTAL
CLIMB 3 TO 5 STEPS WITH RAILING: TOTAL
TOILETING: TOTAL
DAILY ACTIVITIY SCORE: 6
DRESSING REGULAR UPPER BODY CLOTHING: TOTAL
WALKING IN HOSPITAL ROOM: TOTAL
MOVING TO AND FROM BED TO CHAIR: TOTAL
TURNING FROM BACK TO SIDE WHILE IN FLAT BAD: TOTAL
HELP NEEDED FOR BATHING: TOTAL
PERSONAL GROOMING: TOTAL
MOBILITY SCORE: 6
DRESSING REGULAR LOWER BODY CLOTHING: TOTAL
STANDING UP FROM CHAIR USING ARMS: TOTAL

## 2024-07-22 ASSESSMENT — PAIN - FUNCTIONAL ASSESSMENT
PAIN_FUNCTIONAL_ASSESSMENT: 0-10
PAIN_FUNCTIONAL_ASSESSMENT: 0-10

## 2024-07-22 ASSESSMENT — PAIN SCALES - GENERAL
PAINLEVEL_OUTOF10: 0 - NO PAIN

## 2024-07-22 NOTE — SIGNIFICANT EVENT
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 In the ED, the patient was hypothermic at 32 C, hypotensive at 70/45 and hypoglycemic with glucose 49. CBC showed pancytopenia with WBC 2.4, hemoglobin 8.4, and platelets 68. CMP showed creatinine 2.41, as well as hypoproteinemia and hypoalbuminemia. Given the patients history of adrenal insufficiency, 100 mg IV Solu-Cortef was administered along with dextrose. Endocrinology was consulted who agreed with administration of Solu-Cortef. Unfortunately, blood pressure did not significantly improve with steroids or fluids and the patient required norepinephrine to maintain blood pressure before being admitted to the ICU.     Given the hypotension there was concern for septic shock, and the patient was started on Zosyn which was changed to Linezolid due to prior culture growing enterococcus faecium. Urine culture on this admission was negative and the patient was continued back on Zosyn. CT CAP was ordered which showed diverticulitis of the hepatic flexure, trace ascites, small right pleural effusion, and inflammatory nodules in the right middle and left lower lobe. GI was consulted due to diverticulitis who recommended continuing antibiotics with possible outpatient colonoscopy in 6 weeks before signing off. Endocrinology recommended continuing steroids which were weaned down to IV HC 25 mg q8h. Palliative was consulted due to the frequency and consistency of hospital readmissions, per palliative notes there is a plan to continue outpatient palliative follow up with [facility] but they are not  quite ready for hospice, and palliative signed off. While in the ICU, the patient had multiple dark and bright red BMs concerning for GI bleed and nasal bleeding that warranted removal of the placed NG tube. At that time hemoglobin was 6.6 and 1 unit pRBCs was required.   ENT was consulted due to epistaxis, who recommended Rhino Rocket's which were eventually removed. Further epistaxis was controlled with silver nitrate and as needed afrin with saline spray and Merocel sponge. Hemoglobin again decreased to 6.6 requiring another unit of blood and GI was contacted. GI recommended  possible CTA to evaluate for active bleed, however due to kidney function this was unable to be completed. The patient had been NPO due to diverticulitis and a Corpak was attempted, but was unable to be placed, unfortunately the patient is a poor PEG candidate and GI again signed off. Nephrology was consulted due to likely ischemic and sepsis related ATN on CKD3b and hypernatremia, D5W was administered as maintenance over 1 L, and there was no acute indication for RRT as the patient began to autodiurese. Hematology was consulted due to pancytopenia with plan for possible bone marrow biopsy in 4 weeks to assess for possible underlying etiology of pancytopenia. A repeat CXR was performed as the patient began requiring more oxygen, and this showed pulmonary edema with minimal small pleural effusions. The patient remained stable with Norepinephrine weaned off since 7/16, and after additional 1 unit platelet transfusion was downgraded to the general medicine service.    Unfortunately, after initial downgrade the patient developed atrial fibrillation with RVR confirmed by EKG with rates in the 140-170s. Blood pressure was low and 500 ml bolus of LR was given, however this did not significantly improve. After discussion with ICU attending the patient was trialed on 5 mg lopressor, which improved but did not resolve RVR. The patient continued to be  hemodynamically unstable and was upgraded to the ICU.    While in the ICU, the patient spontaneously converted to NSR. Metoprolol was started at 6.25 BID for further rate control. Norepinephrine was restarted due to hypotension but was subsequently weaned off on the morning of 7/21. The patient completed her course of Zosyn and Cellcept was resumed. There was another episode of anemia requiring 1 unit pRBC transfusion, but hemoglobin incremented appropriately. TPN was started per Nephrology due to third spacing and edema, along with 40 mg furosmide po daily. Given concerns for lupus contributing to problems the patient is to be transferred to [facility] for longitudinal Rheumatology care. Dr. Acharya accepted the patient. Due to the patient lacking ICU needs, she was downgraded once again to general medicine.    General medicine to assume care at 24 hours after placement of transfer order or after the patient physically leaves the ICU.    Physical Exam  Constitutional:       General: She is not in acute distress.     Appearance: She is obese. She is ill-appearing.      Comments: Frail   HENT:      Head: Normocephalic and atraumatic.      Nose:      Comments: Dried blood surrounding the nares  Rhino rocket in right nare  Eyes:      Extraocular Movements: Extraocular movements intact.   Cardiovascular:      Rate and Rhythm: Normal rate and regular rhythm.      Heart sounds: No murmur heard.     No friction rub. No gallop.   Pulmonary:      Effort: Pulmonary effort is normal. No respiratory distress.      Breath sounds: No wheezing or rales.   Abdominal:      General: Abdomen is flat. There is no distension.      Palpations: Abdomen is soft.      Tenderness: There is no abdominal tenderness. There is no guarding.   Musculoskeletal:         General: Swelling present.   Skin:     General: Skin is warm and dry.   Neurological:      Mental Status: She is alert and oriented to person, place, and time.      Sensory: No sensory  deficit.      Motor: Weakness (symmetric upper and lower extremities bilaterally) present.         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 Patient was found to be in shock requiring pressors due to either sepsis or adrenal insufficiency and was admitted to the ICU. There was concern for GI bleed and multiple specialists were consulted before the patient was hemodynamically stable and deemed appropriate for downgrade to the general medicine service. There was hemodynamic instability with afib RVR and the patient was upgraded back to the ICU before being stabilized and downgraded. Patient is to be transferred to [facility] due to concerns regarding underlying rheumatologic etiology causing symptoms.     # Shock 2/2 sepsis versus adrenal insufficiency  # AMS likely 2/2 above  # Hypothermia - resolved  -Pressors weaned off on 7/16 and again on 7/21  -Blood and urine cultures negative  -Cdiff and stool pathogen panel negative  -CT CAP showing diverticulitis at the hepatic flexure without abscess or obvious perforation, there are new likely inflammatory nodules in the right middle and left lower lobe, as well as new small right pleural effusion with small amount of ascites  -Repeat CXR showing mild patchy bilateral ground glass opacities, with possible minimal/small pleural effusions  -Completed course of Zosyn (7/17 - 7/22)  -Continue hydrocortisone 25 mg q8hr, will wean back to home steroids  -Endocrinology consulted, there is concern that the patient is pocketing pills as she continues to present with adrenal insufficiency. Endocrine  counseled the patient on the significant danger of this activity. Appreciate recs  -PT/OT  -SLP following     # Epistaxis  # Hematochezia  # Normocytic anemia  # Thrombocytopenia  -S/p 3 units pRBCs due to anemia and 1 unit platelets  -Fecal occult positive  -CT scan showing diverticulitis  -Rhino rockets removed and replaced, Merocel sponge and afrin as needed to prevent oozing/further bleed  -Protonix 40 mg daily  -Per ENT, Augmentin as prophylaxis given epistaxis  -GI consulted, recommended supportive care, conservative management, possible CTA if active bleeding presents. Patient has poor GFR making CTA difficult to obtain, and is a poor colonoscopy candidate. Ultimately recommended continuing GOC and hospice discussions before signing off. Appreciate recs  -ENT consulted, recommended continuing Merocel sponge and Afrin as needed with as needed rhino rockets to control bleeding. Appreciate recs  -Hematology consulted, recommended outpatient biopsy in 4 weeks. Appreciate recs.     # ATN 2/2 ischemia due to sepsis and GI bleed  # CKD3b  # Hypernatremia - resolved  -Creatinine on admission 2.41, baseline 1.6-1.9  -S/p 1 L of D5W  -Avoid nephrotoxic agents  -Renally dose medications  -40 mg lasix PO daily  -Montano in place, strict I/Os  -Nephrology consulted, no indication for urgent RRT. Appreciate recs     # SLE  -Case was discussed with the patients rheumatologist Dr. Hoyos, who had not seen the patient in at least a year but agreed with holding Cellcept in the setting of above.  -Resumed Cellcept after completion of abx  -There is concern that rheumatologic etiology is causing presenting symptoms, patient to be transferred to [facility]    # Paroxysmal atrial fibrillation  -Not currently on anticoagulation due to bleed risk  -Metoprolol tartrate 6.25 mg BID started  -Optimize electrolytes with goal K >4.0 and Mg > 2.0  -Telemetry     # Malnutrition  Patient has low albumin and protein and third spaces significant  amount of fluid, will diurese as tolerated and highly encourage PO intake with protein supplementation.  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     Case to be staffed with attending physician,  Cheko Angel D.O. PGY-2

## 2024-07-22 NOTE — PROGRESS NOTES
INPATIENT NEPHROLOGY CONSULT PROGRESS NOTE      Patient Name: Bing Holliday MRN: [id number]  DATE of SERVICE: July 22, 2024  TIME of SERVICE: 10:21 AM  CONSULTING SERVICE: Nephrology    REASON for CONSULT: JED    SUBJECTIVE:  Seen and evaluated at bedside in the intensive care unit.  Feeling better today.  Has been started on TPN overnight.  Nasal packing in place.  Workup for ongoing epistaxis in place including testing for antiphospholipid antibody syndrome.      SUMMARY OF STAY:  Ms. Holliday is a 63 y.o. female who presented to [facility] July 15, 2024 for evaluation of weakness noted at skilled nursing facility.  Diagnosed with septic shock, acute diverticulitis, acute diverticular bleed and admitted to the intensive care unit.  Patient with complex past medical history including lupus nephritis class V in 2011, systemic lupus erythematosus with chronic kidney disease stage IIIb,  Cerebritis and arthropathy, uncontrolled type 2 diabetes mellitus, paroxysmal atrial fibrillation, heart failure with reduced ejection fraction, coronary artery disease status post CABG 2013, COPD, pulmonary hypertension, hypertension, hyperlipidemia, GERD.    ASSESSMENT:  Acute kidney injury superimposed on chronic kidney disease stage IIIb:  -- Acute kidney injury likely ischemic related ATN in the setting of septic shock and acute GI bleed, peak serum creatinine up to 2.6 mg deciliter with drop in GFR to 20 mill per minute per 1.73 m²  --From baseline serum creatinine of 1.5 and EGFR of 40 mill per minute per 1.73 m²  --Immunocompromised host with pancytopenia  --Acute diverticulitis with diverticular bleed, s/p blood transfusion.   --Montano catheter in place no signs of obstruction  --On background CKD 3B, lupus nephritis class V  --Code status DNR/DNI no dialysis   --Patient reports that her mother passed During hemodialysis session and she does not wish to be on  hemodialysis.     Septic shock secondary to acute diverticulitis:   -- Was hypothermic on presentation  -- Shock resolved, off pressor support     Acute hypoxic respiratory failure requiring intermittent Venturi mask     Volume: Tendency to third space due to hypoalbuminemia, CKD, diabetes patient with underlying abdominal situs.  -- Chest x-ray with findings concerning for pulmonary edema  -- Intermittent volume expansion with oncotic solution to maintain hemodynamic stability.     Acute blood loss anemia, GI team following patient at high risk to have procedure.     Immunocompromised     Systemic lupus erythematosus managed with mycophenolate and prednisone.     Adrenal insufficiency: On hydrocortisone 50 mg 3 times daily.     Pancytopenia     Pulmonary hypertension     Paroxysmal atrial fibrillation, rate controlled       PLAN:  Acute kidney injury superimposed on chronic kidney disease stage IIIb, acute kidney injury multifactorial component of ischemic related ATN, sepsis related ATN.       Metabolic encephalopathy improved after TPN initiation.  To continue to monitor electrolytes and volume status closely  Since patient on TPN with tendency to third space, furosemide 40 mg p.o. daily    Post blood transfusion yesterday, receiving another unit of packed RBCs today  Epistaxis under control  To cont protein supplements 3 times daily.  Strict input and output to guide volume management.     Will follow, thank you!    Medications:    Current Facility-Administered Medications:     acetaminophen (Tylenol) tablet 650 mg, 650 mg, oral, q4h PRN, Donald Busby MD    Adult Clinimix Parenteral Nutrition Continuous, 83 mL/hr, intravenous, Daily PN, Mira Fritz MD    atorvastatin (Lipitor) tablet 40 mg, 40 mg, oral, Nightly, Donald Busby MD, 40 mg at 07/21/24 2102    budesonide (Pulmicort) 0.5 mg/2 mL nebulizer solution 0.5 mg, 0.5 mg, nebulization, BID, Donald Busby MD, 0.5 mg at 07/22/24  0942    dextrose 50 % injection 12.5 g, 12.5 g, intravenous, q15 min PRN, Donald Busby MD    dextrose 50 % injection 25 g, 25 g, intravenous, q15 min PRN, Donald Busby MD    [Held by provider] docusate sodium (Colace) capsule 100 mg, 100 mg, oral, BID, Cheko Angel DO    folic acid (Folvite) tablet 1 mg, 1 mg, oral, Daily, Donald Busby MD, 1 mg at 07/22/24 0832    [Held by provider] formoterol (Perforomist) 20 mcg/2 mL nebulizer solution 20 mcg, 20 mcg, nebulization, BID, Donald Busby MD, 20 mcg at 07/22/24 0943    glucagon (Glucagen) injection 1 mg, 1 mg, intramuscular, q15 min PRN, Donald Busby MD    glucagon (Glucagen) injection 1 mg, 1 mg, intramuscular, q15 min PRN, Donald Busby MD    [Held by provider] heparin (porcine) injection 5,000 Units, 5,000 Units, subcutaneous, q8h, Cheko Angel DO, 5,000 Units at 07/16/24 2349    hydrocortisone sod succ (PF) (Solu-CORTEF) injection 25 mg, 25 mg, intravenous, q8h, Donald Busby MD, 25 mg at 07/22/24 1712    insulin lispro (HumaLOG) injection 0-5 Units, 0-5 Units, subcutaneous, q4h, Donald Busby MD, 2 Units at 07/22/24 1713    ipratropium-albuteroL (Duo-Neb) 0.5-2.5 mg/3 mL nebulizer solution 3 mL, 3 mL, nebulization, q4h PRN, Donald Busby MD    ipratropium-albuteroL (Duo-Neb) 0.5-2.5 mg/3 mL nebulizer solution 3 mL, 3 mL, nebulization, TID, Donald Busby MD, 3 mL at 07/22/24 1504    levETIRAcetam (Keppra) 500 mg in sodium chloride (iso)  mL, 500 mg, intravenous, q12h, Donald Busby MD, Stopped at 07/22/24 0847    magnesium sulfate 2 g in sterile water for injection 50 mL, 2 g, intravenous, q6h PRN, Donald Busby MD, Stopped at 07/18/24 0800    [Held by provider] melatonin tablet 5 mg, 5 mg, oral, Nightly, Cheko Angel DO    metoprolol tartrate (Lopressor) tablet 6.25 mg, 6.25 mg, oral, BID, Krista Lambert MD, 6.25 mg at 07/22/24 0832    mycophenolate (Cellcept)  capsule 1,000 mg, 1,000 mg, oral, BID, Jaziel Tyler DO    oxygen (O2) therapy, , inhalation, Continuous PRN - O2/gases, Donald Busby MD, 30 percent at 07/20/24 0850    pantoprazole (ProtoNix) injection 40 mg, 40 mg, intravenous, Daily, Donald Busby MD, 40 mg at 07/22/24 1327    piperacillin-tazobactam (Zosyn) 3.375 g in dextrose (iso) IV 50 mL, 3.375 g, intravenous, q8h, Donald Busby MD, Stopped at 07/22/24 1727    polyethylene glycol (Glycolax, Miralax) packet 17 g, 17 g, oral, Daily, Donald Busby MD, 17 g at 07/21/24 0848    potassium chloride 40 mEq in sterile water for injection 100 mL, 40 mEq, intravenous, q6h PRN, Donald Busby MD    risperiDONE (RisperDAL) tablet 3 mg, 3 mg, oral, BID, Donald Busby MD, 3 mg at 07/22/24 0832    sertraline (Zoloft) tablet 25 mg, 25 mg, oral, Nightly, Donald Busby MD, 25 mg at 07/21/24 2101    [Held by provider] sodium chloride (Ocean) 0.65 % nasal spray 2 spray, 2 spray, Each Nostril, TID, Cheko Angel DO    thiamine (Vitamin B-1) tablet 100 mg, 100 mg, oral, Daily, Donald Busby MD, 100 mg at 07/22/24 1200    PERTINENT ROS:  GENERAL:  positive for fatigue, poor appetite.  No fever/chills  RESPIRATORY:  positive for shortness of breath.  Negative for cough, wheezing.  CARDIOVASCULAR:   Negative for chest pain or palpitation.  GI:  Negative for abdominal pain, diarrhea, heartburn, nausea, vomiting  : + ruano     Physical Exam:  Vital signs in last 24 hours:  Temp:  [35 °C (95 °F)-37.7 °C (99.9 °F)] 36.3 °C (97.3 °F)  Heart Rate:  [64-92] 72  Resp:  [9-25] 20  BP: (101-143)/(58-84) 141/83    General: Awake, on venturi mask  HEENT:  + nasal packing   NECK:  no  JVD, no carotid bruit, supple, no cervical mass or thyromegaly  LUNGS;  Diminished breath sounds, no Rales  CV:  Distant, regular rate and rhythm, no murmurs  ABDOMEN:  abdomen soft, nontender, BS normal, no masses or organomegaly  EDEMA:  no lower extremity  edema/dependent edema  SKIN:  dry and normal turgor, no clubbing, cyanosis or petechia.  No rashes noted  : + ruano     Intake/Output last 3 shifts:  I/O last 3 completed shifts:  In: 3155.5 (32.1 mL/kg) [P.O.:1140; I.V.:111.4 (1.1 mL/kg); IV Piggyback:1200]  Out: 1025 (10.4 mL/kg) [Urine:1025 (0.3 mL/kg/hr)]  Weight: 98.4 kg     DATA:  Diagnotic tests reviewed for Todays visit:  Results from last 7 days   Lab Units 07/22/24  1451   WBC AUTO x10*3/uL 13.7*   RBC AUTO x10*6/uL 2.84*   HEMOGLOBIN g/dL 8.1*   HEMATOCRIT % 26.3*     Results from last 7 days   Lab Units 07/22/24  0324   SODIUM mmol/L 137   POTASSIUM mmol/L 4.1   CHLORIDE mmol/L 108*   CO2 mmol/L 23   BUN mg/dL 28*   CREATININE mg/dL 1.71*   CALCIUM mg/dL 7.7*   PHOSPHORUS mg/dL 3.5   MAGNESIUM mg/dL 1.97   BILIRUBIN TOTAL mg/dL 0.5   ALT U/L 9   AST U/L 13     Results from last 7 days   Lab Units 07/21/24  1621   COLOR U  Yellow   APPEARANCE U  Turbid*   PH U  5.5   SPEC GRAV UR  1.021   PROTEIN U mg/dL 70 (1+)*   BLOOD UR  0.2 (2+)*   NITRITE U  NEGATIVE   WBC UR /HPF >50*     IMAGING: CXR reviewed in  images      SIGNATURE: Vikram Perez MD  Nephrology and Hypertension  [address] [facility] [address] [place]  Office phone: [phone]  FAX: [phone]    This note was partially generated using the Dragon voice recognition system, and there may be some incorrect words, spelling's and punctuation that were not noted in checking the note before saving.

## 2024-07-22 NOTE — PROGRESS NOTES
Speech-Language Pathology    Therapy Communication Note     Patient Name: Bing Holliday  MRN: [id number]  Today's Date: 7/22/2024    Discipline: Speech Language Pathology     Missed Visit Reason: Attempted to see pt this date for dysphagia tx. She was unavailable at this time d/t requiring assist from RN/PCA. Will continue to attempt tx pending pt availability. RN aware.

## 2024-07-22 NOTE — DISCHARGE SUMMARY
Discharge Diagnosis  Adrenal insufficiency  Septic shock secondary to diverticulitis    Issues Requiring Follow-Up  Adrenal insufficiency  Systemic lupus erythematosus  Pancytopenia  Acute kidney injury on chronic kidney disease  Failure to thrive    Test Results Pending At Discharge  Pending Labs       Order Current Status    Anti-DNA antibody, double-stranded In process    C3 complement In process    C4 complement In process    Haptoglobin In process    Lupus Anticoagulant With Interpretation [HARRISON] In process    Blood Culture Preliminary result            Hospital Course    Patient is a 64 y/o F with PMHx significant for 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    In the ED, the patient was hypothermic at 32 C, hypotensive at 70/45 and hypoglycemic with glucose 49. CBC showed pancytopenia with WBC 2.4, hemoglobin 8.4, and platelets 68. CMP showed creatinine 2.41, as well as hypoproteinemia and hypoalbuminemia. Given the patients history of adrenal insufficiency, 100 mg IV Solu-Cortef was administered along with dextrose. Endocrinology was consulted who agreed with administration of Solu-Cortef.  She remained hypotensive and was started on norepinephrine.      Given the hypotension there was concern for septic shock, and the patient was started on Zosyn which was changed to Linezolid due to prior culture growing enterococcus faecium. Urine culture on this admission was negative and the patient was continued back on Zosyn. CT C/A/P was ordered which showed diverticulitis of the  hepatic flexure, trace ascites, small right pleural effusion, and inflammatory nodules in the right middle and left lower lobe. GI was consulted due to diverticulitis who recommended continuing antibiotics with possible outpatient colonoscopy in 6 weeks before signing off. Endocrinology recommended continuing steroids which were weaned down to IV hydrocortisone 25 mg q8h. Norepinephrine weaned off 7/16.    While in the ICU, the patient had multiple dark and bright red BMs concerning for GI bleed and epistaxis after attempting to place a CorPak for nutrition and oral home medications. At that time hemoglobin was 6.6 and 1 unit pRBCs was required.  ENT was consulted and she required nasal packing bilaterally as well as chemical cautery with silver nitrate.  Her left nasal packing has since been removed, a Rhino Rocket remains in place on the right nostril.  GI reconsulted.  The patient is a poor candidate for EGD/colonoscopy at this time given the active diverticulitis, inability to complete bowel prep, and acute illness.  CTA of the abdomen pelvis could be obtained to evaluate for source of bleeding for source control by IR, but given her JED on CKD, patient will be high risk for renal failure and the possibility of dialysis.  Patient has stated that she does not want to be placed on dialysis.  Fortunately, patient's bloody stools stopped and hemoglobin stabilized, and she remained hemodynamically stable.   Nephrology was consulted due to likely ischemic and sepsis related ATN on CKD3b and hypernatremia, D5W was administered as maintenance over 1 L, and there was no acute indication for RRT as the patient began to autodiurese.   Hematology was consulted due to pancytopenia with plan for possible bone marrow biopsy in 4 weeks to assess for possible underlying etiology of pancytopenia.  Palliative was consulted due to the frequency and consistency of hospital readmissions, per palliative notes there is a plan to  continue outpatient palliative follow up with [facility] but they are not quite ready for hospice.    Patient no longer had any ICU needs and was downgraded to the medicine service on 7/20.  However, that evening, she went into atrial fibrillation with rapid ventricular response.  Rate control was attempted with department Toprol, but she became hypotensive requiring norepinephrine.  She was transferred back to the ICU.  Her electrolytes abnormalities were corrected.  She was restarted on her metoprolol, but at a lower dose of metoprolol to tartrate 6.25 mg orally twice a day.  Norepinephrine was weaned off and has remained off since 7/21 in the morning.     Rheumatology is unavailable at [place].  There is concern that lupus may be contributing to her multitude of problems, such as pancytopenia, encephalopathy, JED on CKD.  She has not seen her rheumatologist, Dr. Castellanos, in about a year.  There are barriers for outpatient follow-up, such as residing in a nursing home, and her rheumatologist is on the other side of [place], in [place].  This was discussed with rheumatology and medicine at [facility], and patient has been accepted for transfer by Dr. Acharya.  She does not have any ICU needs at this time and is stable for the regular medical floor.  CRP elevated 2.41, but ESR is normal.  C3, C4, lupus anticoagulant, anti-ds DNA antibody pending at time of transfer.  Patient's son was updated on the plan for transfer and he is agreeable with this.    Pertinent Physical Exam At Time of Discharge  Physical Exam  Vitals and nursing note reviewed.   Constitutional:       Appearance: She is well-developed. She is ill-appearing.   HENT:      Head: Normocephalic and atraumatic.      Right Ear: External ear normal.      Left Ear: External ear normal.      Nose:      Comments: Rhino Rocket in right nostril.     Mouth/Throat:      Mouth: Mucous membranes are dry.   Eyes:      General: No scleral icterus.     Extraocular  Movements: Extraocular movements intact.      Conjunctiva/sclera: Conjunctivae normal.      Pupils: Pupils are equal, round, and reactive to light.   Cardiovascular:      Rate and Rhythm: Normal rate and regular rhythm.      Heart sounds: No murmur heard.  Pulmonary:      Effort: Pulmonary effort is normal. No respiratory distress.      Breath sounds: Normal breath sounds.   Abdominal:      Palpations: Abdomen is soft.      Tenderness: There is no abdominal tenderness. There is no guarding or rebound.   Musculoskeletal:         General: No swelling.      Cervical back: Neck supple.   Skin:     General: Skin is warm and dry.      Comments: Old, closed skin wounds on lower legs bilaterally. Do not appear infected.   Neurological:      General: No focal deficit present.      Mental Status: She is alert and oriented to person, place, and time.     Home Medications     Medication List      START taking these medications     Adult Clinimix Parenteral Nutrition Continuous; Infuse 83 mL/hr at 83   mL/hr over 24 hours into a venous catheter (peripheral line) continuously.   budesonide 0.5 mg/2 mL nebulizer solution; Commonly known as: Pulmicort;   Take 2 mL (0.5 mg) by nebulization 2 times a day. Rinse mouth with water   after use to reduce aftertaste and incidence of candidiasis. Do not   swallow.   * dextrose 50 % injection; Infuse 25 mL (12.5 g) into a venous catheter   every 15 minutes if needed (For blood glucose 41 to 70 mg/dL).   * dextrose 50 % injection; Infuse 50 mL (25 g) into a venous catheter   every 15 minutes if needed (For blood glucose less than or equal to 40   mg/dL).   * glucagon 1 mg injection; Commonly known as: Glucagen; Inject 1 mg into   the muscle every 15 minutes if needed for low blood sugar - see comments   (For blood glucose less than or equal to 40 mg/dL and no IV access).   * glucagon 1 mg injection; Commonly known as: Glucagen; Inject 1 mg into   the muscle every 15 minutes if needed for low  blood sugar - see comments   (For blood glucose less than or equal to 70 mg/dL and no IV access).   hydrocortisone sod succ (PF) 100 mg/2 mL injection; Commonly known as:   Solu-CORTEF; Infuse 0.5 mL (25 mg) into a venous catheter every 8 hours.;   Start taking on: July 23, 2024   * ipratropium-albuteroL 0.5-2.5 mg/3 mL nebulizer solution; Commonly   known as: Duo-Neb; Take 3 mL by nebulization every 4 hours if needed for   wheezing or shortness of breath.   * ipratropium-albuteroL 0.5-2.5 mg/3 mL nebulizer solution; Commonly   known as: Duo-Neb; Take 3 mL by nebulization 3 times a day.   levETIRAcetam 500 mg/100 mL IV; Commonly known as: Keppra; Infuse 100 mL   (500 mg) over 15 minutes into a venous catheter every 12 hours.   mycophenolate 250 mg capsule; Commonly known as: Cellcept; Take 4   capsules (1,000 mg) by mouth 2 times a day.; Replaces: mycophenolate 500   mg tablet   pantoprazole 40 mg injection; Commonly known as: ProtoNix; Infuse 40 mg   into a venous catheter once daily.; Start taking on: July 23, 2024;   Replaces: pantoprazole 40 mg EC tablet   piperacillin-tazobactam 3.375 gram/50 mL IV; Commonly known as: Zosyn;   Infuse 50 mL (3.375 g) at 12.5 mL/hr over 4 hours into a venous catheter   every 8 hours for 6 doses.   polyethylene glycol 17 gram packet; Commonly known as: Glycolax,   Miralax; Take 17 g by mouth once daily.; Start taking on: July 23, 2024   risperiDONE 3 mg tablet; Commonly known as: RisperDAL; Take 1 tablet (3   mg) by mouth 2 times a day.; Replaces: paliperidone 9 mg 24 hr tablet  * This list has 6 medication(s) that are the same as other medications   prescribed for you. Read the directions carefully, and ask your doctor or   other care provider to review them with you.     CHANGE how you take these medications     insulin lispro 100 unit/mL injection; Commonly known as: HumaLOG; Inject   0-5 Units under the skin every 4 hours. Take as directed per insulin   instructions.; What  "changed: how much to take, when to take this, reasons   to take this, additional instructions   metoprolol tartrate 25 mg tablet; Commonly known as: Lopressor; Take   0.25 tablets (6.25 mg) by mouth 2 times a day.; What changed: how much to   take     CONTINUE taking these medications     atorvastatin 40 mg tablet; Commonly known as: Lipitor; Take 1 tablet (40   mg) by mouth once daily at bedtime.   Dexcom G6  misc; Generic drug: blood-glucose meter,continuous;   Use as instructed   Dexcom G6 Sensor device; Generic drug: blood-glucose sensor; Use to   check sugars 3 times daily   Dexcom G6 Transmitter device; Generic drug: blood-glucose transmitter   device; Use as instructed   melatonin 5 mg tablet; Take 1 tablet (5 mg) by mouth once daily at   bedtime.   pen needle, diabetic 31 gauge x 5/16\" needle; Use to inject 1-4 times   daily as directed.     STOP taking these medications     acetaminophen 325 mg tablet; Commonly known as: Tylenol   albuterol-budesonide 90-80 mcg/actuation inhaler; Commonly known as:   Airsupra   fludrocortisone 0.1 mg tablet; Commonly known as: Florinef   folic acid 1 mg tablet; Commonly known as: Folvite   furosemide 40 mg tablet; Commonly known as: Lasix   Glucagon (HCl) Emergency Kit 1 mg recon soln; Generic drug: glucagon HCL   hydrocortisone 10 mg tablet; Commonly known as: Cortef   hydrocortisone 20 mg tablet; Commonly known as: Cortef   Lantus U-100 Insulin 100 unit/mL injection; Generic drug: insulin   glargine   levETIRAcetam 500 mg tablet; Commonly known as: Keppra   meclizine 25 mg tablet; Commonly known as: Antivert   Mucinex 600 mg 12 hr tablet; Generic drug: guaiFENesin   multivitamin with minerals tablet   mycophenolate 500 mg tablet; Commonly known as: Cellcept; Replaced by:   mycophenolate 250 mg capsule   paliperidone 6 mg 24 hr tablet; Commonly known as: Invega   paliperidone 9 mg 24 hr tablet; Commonly known as: Invega; Replaced by:   risperiDONE 3 mg tablet   " pantoprazole 40 mg EC tablet; Commonly known as: ProtoNix; Replaced by:   pantoprazole 40 mg injection   potassium chloride CR 20 mEq ER tablet; Commonly known as: Klor-Con M20   sertraline 25 mg tablet; Commonly known as: Zoloft   thiamine 100 mg tablet; Commonly known as: Vitamin B-1   Trelegy Ellipta 200-62.5-25 mcg blister with device; Generic drug:   fluticasone-umeclidin-vilanter   Venelex ointment; Generic drug: balsam peru-castor oiL       Outpatient Follow-Up  Future Appointments   Date Time Provider Department Center   8/13/2024 10:30 AM Michael Arenas MD [id number] [place] [facility]   8/21/2024  1:20 PM [facility] [address]   8/21/2024  2:15 PM Antonio Rodriges MD [id number] [facility]       Jaziel Tyler DO

## 2024-07-22 NOTE — PROGRESS NOTES
Critical Care Daily Progress        Subjective   Patient is a 63 y.o. female admitted on 7/15/2024 10:13 AM to the ICU for shock secondary to adrenal sufficiency and/or sepsis.    Overnight Events:   Patient weaned off of norepinephrine yesterday morning.  She has been in sinus rhythm, rate controlled.    Patient seen and examined at bedside this morning.  She is sleepy but awakens easily.  She is oriented to hospital, month, year, self.  No specific complaints this morning. Hgb 6.5 today, receiving one unit PRBC.    Scheduled Medications:   atorvastatin, 40 mg, oral, Nightly  budesonide, 0.5 mg, nebulization, BID  [Held by provider] docusate sodium, 100 mg, oral, BID  folic acid, 1 mg, oral, Daily  [Held by provider] formoterol, 20 mcg, nebulization, BID  [Held by provider] heparin (porcine), 5,000 Units, subcutaneous, q8h  hydrocortisone sodium succinate, 25 mg, intravenous, q8h  insulin lispro, 0-5 Units, subcutaneous, q4h  ipratropium-albuteroL, 3 mL, nebulization, TID  levETIRAcetam, 500 mg, intravenous, q12h  [Held by provider] melatonin, 5 mg, oral, Nightly  metoprolol tartrate, 6.25 mg, oral, BID  mycophenolate, 1,000 mg, oral, BID  pantoprazole, 40 mg, intravenous, Daily  piperacillin-tazobactam, 3.375 g, intravenous, q8h  polyethylene glycol, 17 g, oral, Daily  risperiDONE, 3 mg, oral, BID  sertraline, 25 mg, oral, Nightly  [Held by provider] sodium chloride, 2 spray, Each Nostril, TID  thiamine, 100 mg, oral, Daily         Continuous Medications:   Adult Clinimix Parenteral Nutrition, 83 mL/hr, Last Rate: 83 mL/hr (07/22/24 1200)             PRN Medications:   PRN medications: acetaminophen, dextrose, dextrose, glucagon, glucagon, ipratropium-albuteroL, magnesium sulfate, oxygen, potassium chloride    Objective   Vitals:  Temp  Min: 35 °C (95 °F)  Max: 37.7 °C (99.9 °F)  Pulse  Min: 64  Max: 92  BP  Min: 90/67  Max: 132/79  Resp  Min: 9  Max: 24  SpO2  Min: 91 %  Max: 100 %    Physical Exam:   Physical  Exam   Physical Exam  Vitals and nursing note reviewed.   Constitutional:       Appearance: She is well-developed. She is ill-appearing.   HENT:      Head: Normocephalic and atraumatic.      Right Ear: External ear normal.      Left Ear: External ear normal.      Nose:      Comments: Rhino Rocket in right nostril.     Mouth/Throat:      Mouth: Mucous membranes are dry.   Eyes:      General: No scleral icterus.     Extraocular Movements: Extraocular movements intact.      Conjunctiva/sclera: Conjunctivae normal.      Pupils: Pupils are equal, round, and reactive to light.   Cardiovascular:      Rate and Rhythm: Normal rate and regular rhythm.      Heart sounds: No murmur heard.  Pulmonary:      Effort: Pulmonary effort is normal. No respiratory distress.      Breath sounds: Normal breath sounds.   Abdominal:      Palpations: Abdomen is soft.      Tenderness: There is no abdominal tenderness. There is no guarding or rebound.   Musculoskeletal:         General: No swelling.      Cervical back: Neck supple.   Skin:     General: Skin is warm and dry.      Comments: Old, closed skin wounds on lower legs bilaterally. Do not appear infected.   Neurological:      General: No focal deficit present.      Mental Status: She is alert and oriented to person, place, and time.                Assessment/Plan   Overall assessment:  Patient is a 63-year-old female admitted to the ICU for shock, suspect secondary to adrenal insufficiency but sepsis from diverticulitis also within the differential.     Neuro: Encephalopathy secondary to sepsis, adrenal insufficiency - improved, but not at baseline  - History: Lupus cerebritis  - Home meds: Continue home Keppra, risperidone, sertraline.  Resume melatonin as the patient is having difficulty sleeping at night.  - CAM ICU    Cardiovascular: Shock secondary to adrenal insufficiency and/or sepsis - resolved  #Afib w/ RVR  - History: Atrial fibrillation no longer on anticoagulation, pacemaker,  hypertension, hyperlipidemia  - Goals: MAP> 65  - Home meds: Resume home atorvastatin  - Last echo: LVEF 40 to 45% (5/2024)  -Continue Solucortef  -Home metoprolol held due to underlying sepsis; will start metoprolol 6.25mg BID for rate control    Pulmonary:   #AHRF 2/2 pulmonary edema      - History: COPD  - Home meds: Continue DuoNeb and Pulmicort as needed for wheezing  Continuous pulse oximetry   O2 PRN to maintain SpO2 > 94%, wean as tolerated    Gastrointestinal: Diverticulitis at the hepatic flexure   Results from last 7 days   Lab Units 07/22/24  0324 07/21/24  0452 07/20/24  0339 07/19/24  0403 07/18/24  1654 07/18/24  0416 07/17/24  0912   INR   --   --   --   --  1.0  --  1.1   ALK PHOS U/L 77 91 95 97  --    < >  --    AST U/L 13 16 12 12  --    < >  --    ALT U/L 9 11 12 14  --    < >  --     < > = values in this interval not displayed.       - Diet: Pureed diet, passed swallow eval with SLP, PPN for supplement.  - GI on consult for diverticulitis and GI bleed. Continue supportive care. Outpatient colonoscopy in approximately six weeks if appropriate for goals of care. Poor candidate for PEG tube placement at this time. Her oral intake is limited by encephalopathy/appetite rather than a physical obstruction or dysfunction. GI bleed has stopped, continue to monitor bowel movements and hemoglobin.  - PICC line for PPN?  - Prophylaxis: Continue home Protonix 40mg daily  - Bowel regimen: None  - Last BM: Last BM Date: 07/22/24    Renal:  #Acute kidney injury on chronic kidney disease, suspect secondary to initial hypoperfusion/hypovolemia/acute illness - improving  #Hypernatremia - resolved  Results from last 7 days   Lab Units 07/22/24  0324 07/21/24  1748 07/21/24  0705 07/21/24  0452   SODIUM mmol/L 137 141 141 142   POTASSIUM mmol/L 4.1 4.0 3.8 3.6   MAGNESIUM mg/dL 1.97 1.98 2.21 1.94   PHOSPHORUS mg/dL 3.5 3.5 2.4* 2.4*   BUN mg/dL 28* 27* 26* 26*   CREATININE mg/dL 1.71* 1.85* 1.77* 1.76*       Net  IO Since Admission: 7,580.29 mL [07/22/24 1331]  - Daily CMP,Mg,Phos  - History: CKD, baseline creatinine 1.4  - FeNa 0.4%, more consistent with pre-renal  - Nephrology on consult for persistent JED  - Electrolytes: Replete per protocol, goal K>4 Phos >3 Mg >2    Endocrine: #Adrenal insufficiency  Results from last 7 days   Lab Units 07/22/24  1230 07/22/24  0816 07/22/24  0351 07/22/24  0324 07/21/24  2329 07/21/24  2053 07/21/24  1748 07/21/24  1604   POCT GLUCOSE mg/dL 209* 211* 227*  --  232* 191*  --  225*   GLUCOSE mg/dL  --   --   --  237*  --   --  219*  --       -History: Adrenocortical insufficiency, diabetes  -Home meds: Holding Florinef and hydrocortisone, administering Solu-Cortef  -Endocrinology on consult, Solucortef 25mg q8h and will continue to wean with goal of resuming home dose of Florinef and Cortef  -sliding scale: Insulin lispro  -BG < 180 goal    Rheumatology:  -History: Rheumatoid arthritis  -Resuming home mycophenolate now that antibiotic course for diverticulitis has been completed.  -Discussed w/ patient's rheumatologist, Dr. Hoyos, in [place] on 7/18. Ok to hold Cellcept in the setting of acute illness. Limited ability to evaluate the patient over the phone. Patient has not seen her outpatient in at least a year.   -CRP elevated at 4.19 but ESR normal at 11.  Patient has been slow to return to baseline mentation.  Will repeat her final records and include C3, C4, lupus anticoagulant.  -Considering consult to [facility] [place] after labs result.    ENT:  -Bilateral epistaxis and nasal packing after Corpak attempt  -ENT on consult, continuing to follow.  Ok to use Afrin longer than three days.  -Patient on Zosyn for diverticulitis (7/15 - 7/22), start Cefazolin for prophylaxis after the Zosyn finishes if the patient still has nasal packing.    Hematology:  Results from last 7 days   Lab Units 07/22/24  0324 07/21/24  0658 07/21/24  0039 07/20/24  0338   HEMOGLOBIN g/dL 6.5* 7.8* 7.2*  7.4*   HEMATOCRIT % 21.2* 26.9* 23.8* 23.9*   PLATELETS AUTO x10*3/uL 72* 99* 93* 62*     - Daily CBC  -History: Chronic thrombocytopenia  - DVT Prophylaxis: Holding due to GI bleed, ok to resume from ENT perspective    Infectious Disease: Diverticulitis  Results from last 7 days   Lab Units 24  0324 24  0658 24  0039 24  0338 24  1430 24  0904 24  0250   WBC AUTO x10*3/uL 12.3* 13.8* 12.1* 7.9   < > 3.3* 2.8*   SED RATE mm/h 2  --   --   --   --  11  --    CRP mg/dL  --   --   --   --   --   --  4.19*    < > = values in this interval not displayed.     Temp (24hrs), Av.1 °C (97 °F), Min:35 °C (95 °F), Max:37.7 °C (99.9 °F)     -Cultures: Repeat cultures ordered  -Hx E. Faecium UTI, urine culture negative on this admission  -Abx: Zosyn x7 days for diverticulitis (stop ).  -Stool pathogen and C diff negative    Musculoskeletal:   - PT to see   - OT to see       LDA:  CVC 07/15/24 Triple lumen Left Internal jugular (Active)   Placement Date/Time: 07/15/24 1500   Hand Hygiene Performed Prior to CVC Insertion: Yes  Site Prep: Chlorhexidine   Site Prep Agent has Completely Dried Before Insertion: Yes  All 5 Sterile Barriers Used (Gloves, Gown, Cap, Mask, Large Sterile Drape):...   Number of days: 0       Arterial Line 07/15/24 Right Radial (Active)   Placement Date/Time: 07/15/24 (c) 3   Size: 20 G  Orientation: Right  Location: Radial  Site Prep: Chlorhexidine   Local Anesthetic: Injectable  Insertion attempts: 1  Securement Method: Transparent dressing;Taped   Number of days: 0       Urethral Catheter (Active)   Placement Date/Time: 07/15/24 1300     Number of days: 0         CODE STATUS: DNR and No Intubation    Disposition: Patient has been off of pressors since yesterday morning. Her A-fib is rate and rhythm controlled at this time. Does not have ICU needs.    RESTRAINTS: type: None    ABCDEF Checklist  Analgesia: Spontaneous awakening trial to be pursued if  clinically appropriate. RASS goal reviewed  Breathing: Spontaneous breathing trial to be pursued if clinically appropriate. Mechanical power of assisted ventilation reviewed  Choice of analgesia/sedation: Analgesic and sedative agents adjusted per clinical context.   Delirium assessed by CAM, will avoid exacerbating factors  Early mobility and exercise: Physical and occupational therapy engaged  Family: Plan of care, overall trajectory of patient shared with family. Questions elicited and answered as appropriate.     Due to the high probability of life threatening clinical decompensation, the patient required critical care time evaluating and managing this patient.  Critical care time included obtaining a history, examining the patient, ordering and reviewing studies, discussing, developing, and implementing a management plan, evaluating the patient's response to treatment, and discussion with other care team providers. I saw and evaluated the patient myself.  Critical care time was performed exclusive of billable procedures.      Case discussed with attendingCatrina.    07/22/24 at 1:55 PM - Jaziel Tyler DO

## 2024-07-22 NOTE — CARE PLAN
Problem: Skin  Goal: Decreased wound size/increased tissue granulation at next dressing change  Outcome: Progressing  Goal: Promote/optimize nutrition  Outcome: Progressing  Flowsheets (Taken 7/22/2024 1035)  Promote/optimize nutrition:   Assist with feeding   Monitor/record intake including meals   Consume > 50% meals/supplements   Offer water/supplements/favorite foods     Problem: Fall/Injury  Goal: Verbalize understanding of personal risk factors for fall in the hospital  Outcome: Progressing  Goal: Verbalize understanding of risk factor reduction measures to prevent injury from fall in the home  Outcome: Progressing     Problem: Chronic Conditions and Co-morbidities  Goal: Patient's chronic conditions and co-morbidity symptoms are monitored and maintained or improved  Outcome: Progressing     Problem: Diabetes  Goal: Achieve decreasing blood glucose levels by end of shift  Outcome: Progressing  Goal: Maintain electrolyte levels within acceptable range throughout shift  Outcome: Progressing   The patient's goals for the shift include      The clinical goals for the shift include pt will void by end of shift, pox will remain stable    Over the shift, the patient did not make progress toward the following goals. Barriers to progression include need for ICU. Recommendations to address these barriers include monitor, meds, blood transfusion today.

## 2024-07-22 NOTE — PROGRESS NOTES
Occupational Therapy                 Therapy Communication Note    Patient Name: Bing Holliday  MRN: [id number]  Today's Date: 7/22/2024     Discipline: Occupational Therapy    Missed Visit Reason:  OT orders received and chart reviewed. Pt. Hemoglobin 6.5, will hold and attempt again as medially appropriate.     Missed Time: Attempt    Comment:

## 2024-07-23 VITALS
WEIGHT: 208 LBS | BODY MASS INDEX: 29.84 KG/M2 | OXYGEN SATURATION: 96 % | TEMPERATURE: 97 F | HEART RATE: 72 BPM | RESPIRATION RATE: 18 BRPM | DIASTOLIC BLOOD PRESSURE: 78 MMHG | SYSTOLIC BLOOD PRESSURE: 123 MMHG

## 2024-07-23 LAB
ACANTHOCYTES BLD QL SMEAR: ABNORMAL
ALBUMIN SERPL BCP-MCNC: 2.9 G/DL (ref 3.4–5)
ALP SERPL-CCNC: 90 U/L (ref 33–136)
ALT SERPL W P-5'-P-CCNC: 10 U/L (ref 7–45)
ANION GAP SERPL CALC-SCNC: 16 MMOL/L (ref 10–20)
AST SERPL W P-5'-P-CCNC: 17 U/L (ref 9–39)
BASOPHILS # BLD MANUAL: 0 X10*3/UL (ref 0–0.1)
BASOPHILS NFR BLD MANUAL: 0 %
BILIRUB SERPL-MCNC: 0.6 MG/DL (ref 0–1.2)
BUN SERPL-MCNC: 32 MG/DL (ref 6–23)
BURR CELLS BLD QL SMEAR: ABNORMAL
CALCIUM SERPL-MCNC: 8.1 MG/DL (ref 8.6–10.3)
CHLORIDE SERPL-SCNC: 106 MMOL/L (ref 98–107)
CO2 SERPL-SCNC: 19 MMOL/L (ref 21–32)
CREAT SERPL-MCNC: 1.51 MG/DL (ref 0.5–1.05)
EGFRCR SERPLBLD CKD-EPI 2021: 39 ML/MIN/1.73M*2
EOSINOPHIL # BLD MANUAL: 0 X10*3/UL (ref 0–0.7)
EOSINOPHIL NFR BLD MANUAL: 0 %
ERYTHROCYTE [DISTWIDTH] IN BLOOD BY AUTOMATED COUNT: 21.1 % (ref 11.5–14.5)
GIANT PLATELETS BLD QL SMEAR: ABNORMAL
GLUCOSE BLD MANUAL STRIP-MCNC: 226 MG/DL (ref 74–99)
GLUCOSE BLD MANUAL STRIP-MCNC: 246 MG/DL (ref 74–99)
GLUCOSE BLD MANUAL STRIP-MCNC: 267 MG/DL (ref 74–99)
GLUCOSE BLD MANUAL STRIP-MCNC: 302 MG/DL (ref 74–99)
GLUCOSE SERPL-MCNC: 210 MG/DL (ref 74–99)
HAPTOGLOB SERPL-MCNC: 108 MG/DL (ref 37–246)
HCT VFR BLD AUTO: 30.4 % (ref 36–46)
HGB BLD-MCNC: 9 G/DL (ref 12–16)
IMM GRANULOCYTES # BLD AUTO: 0.92 X10*3/UL (ref 0–0.7)
IMM GRANULOCYTES NFR BLD AUTO: 7.3 % (ref 0–0.9)
LYMPHOCYTES # BLD MANUAL: 2.16 X10*3/UL (ref 1.2–4.8)
LYMPHOCYTES NFR BLD MANUAL: 17 %
MAGNESIUM SERPL-MCNC: 2.03 MG/DL (ref 1.6–2.4)
MCH RBC QN AUTO: 28.3 PG (ref 26–34)
MCHC RBC AUTO-ENTMCNC: 29.6 G/DL (ref 32–36)
MCV RBC AUTO: 96 FL (ref 80–100)
MONOCYTES # BLD MANUAL: 0.25 X10*3/UL (ref 0.1–1)
MONOCYTES NFR BLD MANUAL: 2 %
MYELOCYTES # BLD MANUAL: 0.38 X10*3/UL
MYELOCYTES NFR BLD MANUAL: 3 %
NEUTROPHILS # BLD MANUAL: 9.78 X10*3/UL (ref 1.2–7.7)
NEUTS BAND # BLD MANUAL: 0.76 X10*3/UL (ref 0–0.7)
NEUTS BAND NFR BLD MANUAL: 6 %
NEUTS SEG # BLD MANUAL: 9.02 X10*3/UL (ref 1.2–7)
NEUTS SEG NFR BLD MANUAL: 71 %
NRBC BLD-RTO: 3.2 /100 WBCS (ref 0–0)
PHOSPHATE SERPL-MCNC: 3.5 MG/DL (ref 2.5–4.9)
PLATELET # BLD AUTO: 90 X10*3/UL (ref 150–450)
POLYCHROMASIA BLD QL SMEAR: ABNORMAL
POTASSIUM SERPL-SCNC: 4.3 MMOL/L (ref 3.5–5.3)
PROT SERPL-MCNC: 5.5 G/DL (ref 6.4–8.2)
RBC # BLD AUTO: 3.18 X10*6/UL (ref 4–5.2)
RBC MORPH BLD: ABNORMAL
SODIUM SERPL-SCNC: 137 MMOL/L (ref 136–145)
TOTAL CELLS COUNTED BLD: 100
VARIANT LYMPHS # BLD MANUAL: 0.13 X10*3/UL (ref 0–0.5)
VARIANT LYMPHS NFR BLD: 1 %
WBC # BLD AUTO: 12.7 X10*3/UL (ref 4.4–11.3)

## 2024-07-23 PROCEDURE — 2500000001 HC RX 250 WO HCPCS SELF ADMINISTERED DRUGS (ALT 637 FOR MEDICARE OP)

## 2024-07-23 PROCEDURE — 2500000002 HC RX 250 W HCPCS SELF ADMINISTERED DRUGS (ALT 637 FOR MEDICARE OP, ALT 636 FOR OP/ED)

## 2024-07-23 PROCEDURE — 83735 ASSAY OF MAGNESIUM: CPT

## 2024-07-23 PROCEDURE — 2500000004 HC RX 250 GENERAL PHARMACY W/ HCPCS (ALT 636 FOR OP/ED)

## 2024-07-23 PROCEDURE — 2500000005 HC RX 250 GENERAL PHARMACY W/O HCPCS

## 2024-07-23 PROCEDURE — 36415 COLL VENOUS BLD VENIPUNCTURE: CPT

## 2024-07-23 PROCEDURE — 82947 ASSAY GLUCOSE BLOOD QUANT: CPT

## 2024-07-23 PROCEDURE — 85007 BL SMEAR W/DIFF WBC COUNT: CPT

## 2024-07-23 PROCEDURE — 2060000001 HC INTERMEDIATE ICU ROOM DAILY

## 2024-07-23 PROCEDURE — C9113 INJ PANTOPRAZOLE SODIUM, VIA: HCPCS

## 2024-07-23 PROCEDURE — 84100 ASSAY OF PHOSPHORUS: CPT

## 2024-07-23 PROCEDURE — 94640 AIRWAY INHALATION TREATMENT: CPT

## 2024-07-23 PROCEDURE — 80053 COMPREHEN METABOLIC PANEL: CPT

## 2024-07-23 PROCEDURE — 99233 SBSQ HOSP IP/OBS HIGH 50: CPT

## 2024-07-23 PROCEDURE — 85027 COMPLETE CBC AUTOMATED: CPT

## 2024-07-23 PROCEDURE — 92526 ORAL FUNCTION THERAPY: CPT | Mod: GN | Performed by: SPEECH-LANGUAGE PATHOLOGIST

## 2024-07-23 ASSESSMENT — COGNITIVE AND FUNCTIONAL STATUS - GENERAL
DAILY ACTIVITIY SCORE: 7
WALKING IN HOSPITAL ROOM: TOTAL
DRESSING REGULAR LOWER BODY CLOTHING: TOTAL
DAILY ACTIVITIY SCORE: 8
STANDING UP FROM CHAIR USING ARMS: TOTAL
MOBILITY SCORE: 7
PERSONAL GROOMING: TOTAL
MOBILITY SCORE: 7
MOVING FROM LYING ON BACK TO SITTING ON SIDE OF FLAT BED WITH BEDRAILS: A LOT
TURNING FROM BACK TO SIDE WHILE IN FLAT BAD: TOTAL
TOILETING: TOTAL
HELP NEEDED FOR BATHING: TOTAL
MOVING FROM LYING ON BACK TO SITTING ON SIDE OF FLAT BED WITH BEDRAILS: A LOT
STANDING UP FROM CHAIR USING ARMS: TOTAL
WALKING IN HOSPITAL ROOM: TOTAL
DRESSING REGULAR UPPER BODY CLOTHING: TOTAL
DRESSING REGULAR UPPER BODY CLOTHING: A LOT
DRESSING REGULAR LOWER BODY CLOTHING: TOTAL
HELP NEEDED FOR BATHING: TOTAL
TURNING FROM BACK TO SIDE WHILE IN FLAT BAD: TOTAL
MOVING TO AND FROM BED TO CHAIR: TOTAL
CLIMB 3 TO 5 STEPS WITH RAILING: TOTAL
TOILETING: TOTAL
MOVING TO AND FROM BED TO CHAIR: TOTAL
PERSONAL GROOMING: TOTAL
EATING MEALS: A LOT
EATING MEALS: A LOT
CLIMB 3 TO 5 STEPS WITH RAILING: TOTAL

## 2024-07-23 ASSESSMENT — ENCOUNTER SYMPTOMS
CONFUSION: 1
SHORTNESS OF BREATH: 0
CHILLS: 0
APPETITE CHANGE: 1
FEVER: 0
COUGH: 0
DIAPHORESIS: 0
DIARRHEA: 0
JOINT SWELLING: 0
MYALGIAS: 0
WOUND: 1
FATIGUE: 1
ACTIVITY CHANGE: 1
NAUSEA: 0
CONSTIPATION: 0
VOMITING: 0

## 2024-07-23 ASSESSMENT — PAIN SCALES - GENERAL
PAINLEVEL_OUTOF10: 0 - NO PAIN

## 2024-07-23 ASSESSMENT — PAIN - FUNCTIONAL ASSESSMENT
PAIN_FUNCTIONAL_ASSESSMENT: 0-10

## 2024-07-23 NOTE — CARE PLAN
The patient's goals for the shift include      The clinical goals for the shift include Patient safety will be maintained.      Problem: Skin  Goal: Decreased wound size/increased tissue granulation at next dressing change  Flowsheets (Taken 7/23/2024 1856)  Decreased wound size/increased tissue granulation at next dressing change:   Promote sleep for wound healing   Protective dressings over bony prominences  Goal: Promote/optimize nutrition  Flowsheets (Taken 7/23/2024 1856)  Promote/optimize nutrition:   Assist with feeding   Consume > 50% meals/supplements

## 2024-07-23 NOTE — PROGRESS NOTES
Physical Therapy                 Therapy Communication Note    Patient Name: Bing Holliday  MRN: [id number]  Today's Date: 7/23/2024     Discipline: Physical Therapy    Missed Visit Reason: PT order received, chart reviewed. Plan for pt to transfer to [facility]. Will reattempt PT evaluation if discharge plan changes.     Missed Time: Attempt

## 2024-07-23 NOTE — PROGRESS NOTES
07/23/24 0839   Discharge Planning   Expected Discharge Disposition Other Fac   Does the patient need discharge transport arranged? Yes   RoundTrip coordination needed? Yes   Has discharge transport been arranged? No     Message sent to facility to update them that patient has a d/c order in, but it is for the transfer to [facility] for rheumatology. Patient is not ready to go back to facility at this time. TCC to follow for any changes with d/c plan.

## 2024-07-23 NOTE — PROGRESS NOTES
Bing Holliday is a 63 y.o. female on day 8 of admission presenting with Hypothermia, initial encounter.    Subjective   NAEON. Patient seen and evaluated at bedside. She reports that she has no acute complaints but is feeling tired today. Denies any fevers, chills, chest pain, shortness of breath, nausea, vomiting, or further epistaxis.    Objective     Last Recorded Vitals  /77 (BP Location: Right arm, Patient Position: Lying)   Pulse 60   Temp 36.4 °C (97.5 °F) (Temporal)   Resp 21   Wt 104 kg (229 lb 0.9 oz)   SpO2 96%   Intake/Output last 3 Shifts:    Intake/Output Summary (Last 24 hours) at 7/23/2024 1142  Last data filed at 7/23/2024 0838  Gross per 24 hour   Intake 1105.9 ml   Output 2100 ml   Net -994.1 ml     Admission Weight  Weight: 99.8 kg (220 lb 0.3 oz) (07/15/24 1040)    Daily Weight  07/23/24 : 104 kg (229 lb 0.9 oz)    Image Results  ECG 12 lead  Normal sinus rhythm  Possible Left ventricular hypertrophy with repolarization abnormality  Abnormal ECG  When compared with ECG of 18-JUL-2024 21:49, (unconfirmed)  ST no longer depressed in Anterior leads  Confirmed by Cheko Mike ([id number]) on 7/21/2024 2:43:43 PM  ECG 12 lead  Normal sinus rhythm  Possible Left ventricular hypertrophy with repolarization abnormality  Abnormal ECG  When compared with ECG of 15-JUL-2024 13:54, (unconfirmed)  Sinus rhythm has replaced Atrial fibrillation  QT has lengthened  Confirmed by Cheko Mike ([id number]) on 7/21/2024 2:43:13 PM  ECG 12 lead  Normal sinus rhythm  Minimal voltage criteria for LVH, may be normal variant  ST & T wave abnormality, consider inferolateral ischemia  Abnormal ECG  When compared with ECG of 21-JUL-2024 00:20, (unconfirmed)  Sinus rhythm has replaced Atrial fibrillation  Vent. rate has decreased BY  51 BPM  ST no longer depressed in Lateral leads  T wave inversion more evident in Inferior leads  T wave inversion less evident in Lateral leads  ECG 12 Lead  Atrial fibrillation  with rapid ventricular response  Moderate voltage criteria for LVH, may be normal variant  Marked ST abnormality, possible lateral subendocardial injury  Abnormal ECG  When compared with ECG of 18-JUL-2024 21:54, (unconfirmed)  Atrial fibrillation has replaced Sinus rhythm  ST more depressed in Lateral leads    Physical Exam  Constitutional:       General: She is not in acute distress.     Appearance: She is obese. She is ill-appearing.   HENT:      Head: Normocephalic and atraumatic.      Nose:      Comments: Rhino rocket in right nare  Eyes:      Extraocular Movements: Extraocular movements intact.   Cardiovascular:      Rate and Rhythm: Normal rate and regular rhythm.      Heart sounds: No murmur heard.     No friction rub. No gallop.   Pulmonary:      Effort: Pulmonary effort is normal. No respiratory distress.      Breath sounds: No wheezing or rales.   Abdominal:      General: Abdomen is flat. There is no distension.      Palpations: Abdomen is soft.      Tenderness: There is no abdominal tenderness. There is no guarding.   Musculoskeletal:         General: Swelling present.   Skin:     General: Skin is warm and dry.   Neurological:      Mental Status: She is alert.      Sensory: No sensory deficit.      Motor: Weakness present.   Psychiatric:         Mood and Affect: Mood normal.       Relevant Results  Scheduled medications  amoxicillin-pot clavulanate, 1 tablet, oral, q12h GERALD  atorvastatin, 40 mg, oral, Nightly  budesonide, 0.5 mg, nebulization, BID  [Held by provider] docusate sodium, 100 mg, oral, BID  folic acid, 1 mg, oral, Daily  [Held by provider] formoterol, 20 mcg, nebulization, BID  furosemide, 40 mg, oral, Daily  [Held by provider] heparin (porcine), 5,000 Units, subcutaneous, q8h  hydrocortisone sodium succinate, 25 mg, intravenous, q8h  insulin lispro, 0-5 Units, subcutaneous, q4h  ipratropium-albuteroL, 3 mL, nebulization, TID  levETIRAcetam, 500 mg, intravenous, q12h  [Held by provider]  melatonin, 5 mg, oral, Nightly  metoprolol tartrate, 6.25 mg, oral, BID  mycophenolate, 1,000 mg, oral, BID  pantoprazole, 40 mg, intravenous, Daily  polyethylene glycol, 17 g, oral, Daily  risperiDONE, 3 mg, oral, BID  sertraline, 25 mg, oral, Nightly  [Held by provider] sodium chloride, 2 spray, Each Nostril, TID  thiamine, 100 mg, oral, Daily      Continuous medications  Adult Clinimix Parenteral Nutrition Continuous, 83 mL/hr, Last Rate: 83 mL/hr (07/22/24 2116)      PRN medications  PRN medications: acetaminophen, dextrose, dextrose, glucagon, glucagon, ipratropium-albuteroL, magnesium sulfate, oxygen  Results for orders placed or performed during the hospital encounter of 07/15/24 (from the past 24 hour(s))   POCT GLUCOSE   Result Value Ref Range    POCT Glucose 209 (H) 74 - 99 mg/dL   C-reactive protein   Result Value Ref Range    C-Reactive Protein 2.57 (H) <1.00 mg/dL   C3 complement   Result Value Ref Range    C3 Complement 112 87 - 200 mg/dL   C4 complement   Result Value Ref Range    C4 Complement 50 10 - 50 mg/dL   Lactate dehydrogenase   Result Value Ref Range     (H) 84 - 246 U/L   Haptoglobin   Result Value Ref Range    Haptoglobin 108 37 - 246 mg/dL   Anti-DNA antibody, double-stranded   Result Value Ref Range    Anti-DNA (DS) <1.0 <5.0 IU/mL   CBC   Result Value Ref Range    WBC 13.7 (H) 4.4 - 11.3 x10*3/uL    nRBC 3.1 (H) 0.0 - 0.0 /100 WBCs    RBC 2.84 (L) 4.00 - 5.20 x10*6/uL    Hemoglobin 8.1 (L) 12.0 - 16.0 g/dL    Hematocrit 26.3 (L) 36.0 - 46.0 %    MCV 93 80 - 100 fL    MCH 28.5 26.0 - 34.0 pg    MCHC 30.8 (L) 32.0 - 36.0 g/dL    RDW 20.8 (H) 11.5 - 14.5 %    Platelets 85 (L) 150 - 450 x10*3/uL   POCT GLUCOSE   Result Value Ref Range    POCT Glucose 223 (H) 74 - 99 mg/dL   POCT GLUCOSE   Result Value Ref Range    POCT Glucose 365 (H) 74 - 99 mg/dL   POCT GLUCOSE   Result Value Ref Range    POCT Glucose 281 (H) 74 - 99 mg/dL   POCT GLUCOSE   Result Value Ref Range    POCT Glucose 302  (H) 74 - 99 mg/dL   POCT GLUCOSE   Result Value Ref Range    POCT Glucose 226 (H) 74 - 99 mg/dL   CBC and Auto Differential   Result Value Ref Range    WBC 12.7 (H) 4.4 - 11.3 x10*3/uL    nRBC 3.2 (H) 0.0 - 0.0 /100 WBCs    RBC 3.18 (L) 4.00 - 5.20 x10*6/uL    Hemoglobin 9.0 (L) 12.0 - 16.0 g/dL    Hematocrit 30.4 (L) 36.0 - 46.0 %    MCV 96 80 - 100 fL    MCH 28.3 26.0 - 34.0 pg    MCHC 29.6 (L) 32.0 - 36.0 g/dL    RDW 21.1 (H) 11.5 - 14.5 %    Platelets 90 (L) 150 - 450 x10*3/uL    Immature Granulocytes %, Automated 7.3 (H) 0.0 - 0.9 %    Immature Granulocytes Absolute, Automated 0.92 (H) 0.00 - 0.70 x10*3/uL   Manual Differential   Result Value Ref Range    Neutrophils %, Manual 71.0 40.0 - 80.0 %    Bands %, Manual 6.0 0.0 - 5.0 %    Lymphocytes %, Manual 17.0 13.0 - 44.0 %    Monocytes %, Manual 2.0 2.0 - 10.0 %    Eosinophils %, Manual 0.0 0.0 - 6.0 %    Basophils %, Manual 0.0 0.0 - 2.0 %    Atypical Lymphocytes %, Manual 1.0 0.0 - 2.0 %    Myelocytes %, Manual 3.0 0.0 - 0.0 %    Seg Neutrophils Absolute, Manual 9.02 (H) 1.20 - 7.00 x10*3/uL    Bands Absolute, Manual 0.76 (H) 0.00 - 0.70 x10*3/uL    Lymphocytes Absolute, Manual 2.16 1.20 - 4.80 x10*3/uL    Monocytes Absolute, Manual 0.25 0.10 - 1.00 x10*3/uL    Eosinophils Absolute, Manual 0.00 0.00 - 0.70 x10*3/uL    Basophils Absolute, Manual 0.00 0.00 - 0.10 x10*3/uL    Atypical Lymphs Absolute, Manual 0.13 0.00 - 0.50 x10*3/uL    Myelocytes Absolute, Manual 0.38 0.00 - 0.00 x10*3/uL    Total Cells Counted 100     Neutrophils Absolute, Manual 9.78 (H) 1.20 - 7.70 x10*3/uL    RBC Morphology See Below     Polychromasia Mild     Spencer Cells Many     Acanthocytes Few     Giant Platelets Few    Comprehensive Metabolic Panel   Result Value Ref Range    Glucose 210 (H) 74 - 99 mg/dL    Sodium 137 136 - 145 mmol/L    Potassium 4.3 3.5 - 5.3 mmol/L    Chloride 106 98 - 107 mmol/L    Bicarbonate 19 (L) 21 - 32 mmol/L    Anion Gap 16 10 - 20 mmol/L    Urea Nitrogen 32  (H) 6 - 23 mg/dL    Creatinine 1.51 (H) 0.50 - 1.05 mg/dL    eGFR 39 (L) >60 mL/min/1.73m*2    Calcium 8.1 (L) 8.6 - 10.3 mg/dL    Albumin 2.9 (L) 3.4 - 5.0 g/dL    Alkaline Phosphatase 90 33 - 136 U/L    Total Protein 5.5 (L) 6.4 - 8.2 g/dL    AST 17 9 - 39 U/L    Bilirubin, Total 0.6 0.0 - 1.2 mg/dL    ALT 10 7 - 45 U/L   Magnesium   Result Value Ref Range    Magnesium 2.03 1.60 - 2.40 mg/dL   Phosphorus   Result Value Ref Range    Phosphorus 3.5 2.5 - 4.9 mg/dL     *Note: Due to a large number of results and/or encounters for the requested time period, some results have not been displayed. A complete set of results can be found in Results Review.     No results found.    Assessment/Plan   Active Problems:  There are no active Hospital Problems.    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 Patient was found to be in shock requiring pressors due to either sepsis or adrenal insufficiency and was admitted to the ICU. There was concern for GI bleed and multiple specialists were consulted before the patient was hemodynamically stable and deemed appropriate for downgrade to the general medicine service. There was hemodynamic instability with afib RVR and the patient was upgraded back to the ICU before being stabilized and downgraded. Patient is to be transferred to [facility] due to concerns regarding underlying rheumatologic etiology causing symptoms.     # Shock 2/2 sepsis versus adrenal insufficiency  # AMS likely 2/2 above  # Hypothermia - resolved  -Pressors weaned off on 7/16 and again on 7/21  -Blood and urine cultures negative  -Cdiff and stool pathogen panel  negative  -CT CAP showing diverticulitis at the hepatic flexure without abscess or obvious perforation, there are new likely inflammatory nodules in the right middle and left lower lobe, as well as new small right pleural effusion with small amount of ascites  -Repeat CXR showing mild patchy bilateral ground glass opacities, with possible minimal/small pleural effusions  -Completed course of Zosyn (7/17 - 7/22)  -Continue hydrocortisone 25 mg q8hr, will wean back to home steroids  -Endocrinology consulted, there is concern that the patient is pocketing pills as she continues to present with adrenal insufficiency. Endocrine counseled the patient on the significant danger of this activity. Appreciate recs  -PT/OT  -SLP following     # Epistaxis  # Hematochezia  # Normocytic anemia  # Thrombocytopenia  -S/p 3 units pRBCs due to anemia and 1 unit platelets  -Fecal occult positive  -CT scan showing diverticulitis  -Rhino rockets removed and replaced, Merocel sponge and afrin as needed to prevent oozing/further bleed  -Protonix 40 mg daily  -Augmentin as prophylaxis given epistaxis and rhino rocket  -GI consulted, recommended supportive care, conservative management, possible CTA if active bleeding presents. Patient has poor GFR making CTA difficult to obtain, and is a poor colonoscopy candidate. Ultimately recommended continuing GOC and hospice discussions before signing off. Appreciate recs  -ENT consulted, rhino rockets to control bleeding with antibiotic prophylaxis. Appreciate recs  -Hematology consulted, recommended outpatient biopsy in 4 weeks. Appreciate recs.     # ATN 2/2 ischemia due to sepsis and GI bleed  # CKD3b  # Hypernatremia - resolved  -Creatinine on admission 2.41, baseline 1.6-1.9  -S/p 1 L of D5W  -Avoid nephrotoxic agents  -Renally dose medications  -40 mg lasix PO daily  -Montano in place, strict I/Os  -Nephrology consulted, no indication for urgent RRT. Appreciate recs     # SLE  -Case was discussed  with the patients rheumatologist Dr. Hoyos, who had not seen the patient in at least a year but agreed with holding Cellcept in the setting of above.  -Resumed Cellcept after completion of abx  -There is concern that rheumatologic etiology is causing presenting symptoms, patient to be transferred to [facility] [address]     # Afib with RVR  -Not currently on anticoagulation due to bleed risk  -Metoprolol tartrate 6.25 mg BID started  -Optimize electrolytes with goal K >4.0 and Mg > 2.0  -Telemetry     # Malnutrition  Patient has low albumin and protein and third spaces significant amount of fluid, will diurese as tolerated and highly encourage PO intake with protein supplementation.  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     The assessment and plan were discussed with the attending physician,  Cheko Angel D.O. PGY-2

## 2024-07-23 NOTE — PROGRESS NOTES
Spiritual Care Visit    Clinical Encounter Type  Visited With: Patient (I saw the patient on July 22.)  Routine Visit: Introduction  Continue Visiting: No    Hoahaoism Encounters  Hoahaoism Needs: Prayer (I saw the patient on July 22.)                                       Taxonomy  Intended Effects: Establish rapport and connectedness, Nemo affirmation, Build relationship of care and support, Demonstrate caring and concern, Lessen anxiety, Lessen someone's feelings of isolation, Helping someone feel comforted (I saw the patient on July 22.)

## 2024-07-23 NOTE — PROGRESS NOTES
Subjective   Patient ID: Bing Holliday is a 63 y.o. female who is acute skilled care being seen and evaluated for multiple medical problems.    HPI   Bing Holliday is a 63 y.o. female returns from hospitalization due to encephalopathy, sepsis, pna, adrenal insufficiency.  HPI   She had increased confusion, weakness and hypotension, found to have sepsis, encephalopathy, hypothermia, hypotension, RLL pna.  She required vasopressors and clinically improved with IV fluids and atb.  She received 1 unit prbc.   She was initally not swallowing well, per notes she passed a swallow evaluation and will continue to work with speech therapy.   She was evaluated by endocrinology and hydrocortisone dose decreased and recommnedation to follow up to consider biphosphonate therapy due to chronic steroids.   She had persistent thrombocytopenia and eliquis was held with recommendation to follow up with cardiology to consider further anticoagulation or possibel atrial appendage occlusion (Watchman) due to PAF.  She appears weak and deconditioned but more awake and stronger than the day of hospital admission.  She denies any pain when asked.    The patient is sitting up in her bed watching television.  She reports that she feels okay to me.  She denies pain or shortness of breath.  As above note is made of extensive medication changes that were initiated in the hospital.  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     Labs:  7/9  WBC: 4.5  Hgb: 9.0  Hct: 31.1  Platelet: 85     Na: 146  K: 4.7  Cl: 115  Co2: 22  BUN: 23  Creatinine: 1.39  GFR: 43     7/8 Magnesium 1.62     MEDS:  Albuterol-budesonide  Atorvastatin  Fludrocortisone  Folic acid  Lasix  Hydrocortisone (dose decreased)  Lispro ss coverage  Lantus  Keppra  Melatonin  Metoprolol  tartrate  Mucinex  MVI  Mycophenolate  Paliperidone  Pantoprazole  Kcl  Sertraline  Thiamine  Trelegy  Venelex ointment  Review of Systems   Constitutional:  Positive for activity change, appetite change and fatigue. Negative for chills, diaphoresis and fever.   Respiratory:  Negative for cough and shortness of breath.    Cardiovascular:  Negative for chest pain and leg swelling.   Gastrointestinal:  Negative for constipation, diarrhea, nausea and vomiting.   Musculoskeletal:  Negative for joint swelling and myalgias.   Skin:  Positive for wound.   Psychiatric/Behavioral:  Positive for confusion (mild, reorients easily).        Objective   /78   Pulse 72   Temp 36.1 °C (97 °F)   Resp 18   Wt 94.3 kg (208 lb)   LMP  (LMP Unknown)   SpO2 96%   BMI 29.84 kg/m²     Physical Exam  Vitals reviewed.   Constitutional:       General: She is not in acute distress.     Appearance: She is ill-appearing. She is not toxic-appearing.   Cardiovascular:      Rate and Rhythm: Normal rate and regular rhythm.      Pulses: Normal pulses.      Heart sounds:      No gallop.   Pulmonary:      Breath sounds: Normal breath sounds. No wheezing, rhonchi or rales.   Abdominal:      General: Abdomen is flat. Bowel sounds are normal.      Palpations: Abdomen is soft.      Tenderness: There is no guarding or rebound.   Musculoskeletal:      Right lower leg: Edema present.      Left lower leg: Edema present.   Skin:     Comments: Patient has a lower extremity venous stasis ulcer currently covered and dry   Neurological:      Motor: Weakness present.      Coordination: Coordination abnormal.      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  We will continue with rehabilitative restorative and supportive cares patient tolerance    B.  Laboratory examinations will continue to be monitored on an ongoing as-needed basis    C.  The patient will have close outpatient follow-up with her multiple subspecialists regarding what is very likely mixed connective tissue disorder with overlap syndromes between multiple autoimmune diseases.  She appears to have had multiple organ systems were evidenced by chronic systemic lupus and rheumatoid arthritis.    D.  The patient's prognosis is [id number].

## 2024-07-23 NOTE — PROGRESS NOTES
Speech-Language Pathology                 Therapy Communication Note    Patient Name: Bing Holliday  MRN: [id number]  Today's Date: 7/22/2024     Discipline: Speech Language Pathology    Missed Visit Reason: Attempted to see pt this date for dysphagia tx. She was unavailable at this time d/t requiring assist from RN/PCA. Will continue to attempt tx pending pt availability. RN aware.     Missed Time: 1836

## 2024-07-23 NOTE — PROGRESS NOTES
07/23/24 0700   Discharge Planning   Living Arrangements Alone   Support Systems Children   Type of Residence Nursing home/residential care  ([facility])     Patient has a discharge order. Iona made aware in [facility]. Updates sent to Iona in [facility]. Patient needs a new auth.

## 2024-07-23 NOTE — NURSING NOTE
Report called to Marla BURR in IMCU, patient transferred to new unit, room 2106 hooked up to new monitors and oriented to unit. Patient transferred with her glasses on, no other patient belongings other than lotions/soaps.

## 2024-07-23 NOTE — PROGRESS NOTES
INPATIENT NEPHROLOGY CONSULT PROGRESS NOTE      Patient Name: Bing Holliday MRN: [id number]  DATE of SERVICE: July 23, 2024  TIME of SERVICE: 11:30 AM  CONSULTING SERVICE: Nephrology    REASON for CONSULT: JED    SUBJECTIVE:  Seen and evaluated at bedside.   More awake and alert today tolerating oral intake.  Receiving TPN  Renal parameters improving serum creatinine down to 1.5 from 1.7 mg deciliter.  Sodium level stable at 137 potassium 4.3 mild metabolic acidosis bicarb of 19.  Hemoglobin stable today at 9 mg/dL    SUMMARY OF STAY:  Ms. Holliday is a 63 y.o. female who presented to [facility] July 15, 2024 for evaluation of weakness noted at skilled nursing facility.  Diagnosed with septic shock, acute diverticulitis, acute diverticular bleed and admitted to the intensive care unit.  Patient with complex past medical history including lupus nephritis class V in 2011, systemic lupus erythematosus with chronic kidney disease stage IIIb,  Cerebritis and arthropathy, uncontrolled type 2 diabetes mellitus, paroxysmal atrial fibrillation, heart failure with reduced ejection fraction, coronary artery disease status post CABG 2013, COPD, pulmonary hypertension, hypertension, hyperlipidemia, GERD.    ASSESSMENT:  Acute kidney injury superimposed on chronic kidney disease stage IIIb:  -- Acute kidney injury likely ischemic related ATN in the setting of septic shock and acute GI bleed, peak serum creatinine up to 2.6 mg deciliter with drop in GFR to 20 mill per minute per 1.73 m²  --From baseline serum creatinine of 1.5 and EGFR of 40 mill per minute per 1.73 m²  --Immunocompromised host with pancytopenia  --Acute diverticulitis with diverticular bleed, s/p blood transfusion.   --Montano catheter in place no signs of obstruction  --On background CKD 3B, lupus nephritis class V  --Code status DNR/DNI no dialysis   --Patient reports that her mother passed During  hemodialysis session and she does not wish to be on hemodialysis.  --Renal parameters improving serum creatinine down to 1.5 mg deciliter     Septic shock secondary to acute diverticulitis: Resolved  -- Was hypothermic on presentation  -- Shock resolved, off pressor support     Acute hypoxic respiratory failure requiring intermittent Venturi mask     Volume: Tendency to third space due to hypoalbuminemia, CKD, diabetes patient with underlying abdominal situs.  -- Chest x-ray with findings concerning for pulmonary edema  -- Intermittent volume expansion with oncotic solution to maintain hemodynamic stability.     Acute blood loss anemia, GI team following patient at high risk to have procedure.     Immunocompromised     Systemic lupus erythematosus managed with mycophenolate and prednisone.     Adrenal insufficiency: On hydrocortisone 50 mg 3 times daily.     Pancytopenia     Pulmonary hypertension     Paroxysmal atrial fibrillation, rate controlled       PLAN:  Acute kidney injury superimposed on chronic kidney disease stage IIIb, acute kidney injury multifactorial component of ischemic related ATN, sepsis related ATN, septic shock resolved, acute kidney injury improving     Severe diverticulitis with malnutrition to continue TPN for 1 to 2 days.  Epistaxis resolving and patient able to tolerate better oral intake  Continue furosemide 40 mg p.o. daily  To cont protein supplements 3 times daily.  Strict input and output to guide volume management.     Will follow, thank you!    Medications:    Current Facility-Administered Medications:     acetaminophen (Tylenol) tablet 650 mg, 650 mg, oral, q4h PRN, Andrew Aranda MD    Adult Clinimix Parenteral Nutrition Continuous, 83 mL/hr, intravenous, Daily PN, Andrew Aranda MD, Last Rate: 83 mL/hr at 07/22/24 2116, Rate Verify at 07/22/24 2116    amoxicillin-pot clavulanate (Augmentin) 875-125 mg per tablet 1 tablet, 1 tablet, oral, q12h GERALD, Andrew Aranda MD, 1 tablet at  07/23/24 0832    atorvastatin (Lipitor) tablet 40 mg, 40 mg, oral, Nightly, Andrew Aranda MD, 40 mg at 07/22/24 2003    budesonide (Pulmicort) 0.5 mg/2 mL nebulizer solution 0.5 mg, 0.5 mg, nebulization, BID, Andrew Aranda MD, 0.5 mg at 07/23/24 0851    dextrose 50 % injection 12.5 g, 12.5 g, intravenous, q15 min PRN, Andrew Aranda MD    dextrose 50 % injection 25 g, 25 g, intravenous, q15 min PRN, Andrew Aranda MD    [Held by provider] docusate sodium (Colace) capsule 100 mg, 100 mg, oral, BID, Cheko Angel DO    folic acid (Folvite) tablet 1 mg, 1 mg, oral, Daily, Andrew Aranda MD, 1 mg at 07/23/24 0832    [Held by provider] formoterol (Perforomist) 20 mcg/2 mL nebulizer solution 20 mcg, 20 mcg, nebulization, BID, Donald Busby MD, 20 mcg at 07/22/24 0943    furosemide (Lasix) tablet 40 mg, 40 mg, oral, Daily, Andrew Aranda MD, 40 mg at 07/23/24 0832    glucagon (Glucagen) injection 1 mg, 1 mg, intramuscular, q15 min PRN, Andrew Aranda MD    glucagon (Glucagen) injection 1 mg, 1 mg, intramuscular, q15 min PRN, Andrew Aranda MD    [Held by provider] heparin (porcine) injection 5,000 Units, 5,000 Units, subcutaneous, q8h, Cheko Angel DO, 5,000 Units at 07/16/24 2349    hydrocortisone sod succ (PF) (Solu-CORTEF) injection 25 mg, 25 mg, intravenous, q8h, Andrew Aranda MD, 25 mg at 07/23/24 0244    insulin lispro (HumaLOG) injection 0-5 Units, 0-5 Units, subcutaneous, q4h, Andrew Aranda MD, 4 Units at 07/23/24 0047    ipratropium-albuteroL (Duo-Neb) 0.5-2.5 mg/3 mL nebulizer solution 3 mL, 3 mL, nebulization, q4h PRN, Andrew Aranda MD    ipratropium-albuteroL (Duo-Neb) 0.5-2.5 mg/3 mL nebulizer solution 3 mL, 3 mL, nebulization, TID, Andrew Aranda MD, 3 mL at 07/23/24 1542    levETIRAcetam (Keppra) 500 mg in sodium chloride (iso)  mL, 500 mg, intravenous, q12h, Andrew Aranda MD, Stopped at 07/23/24 0848    magnesium sulfate 2 g in sterile water for injection 50 mL, 2 g, intravenous, q6h  PRN, Andrew Aranda MD, Stopped at 07/18/24 0800    [Held by provider] melatonin tablet 5 mg, 5 mg, oral, Nightly, Cheko Angel DO    metoprolol tartrate (Lopressor) tablet 6.25 mg, 6.25 mg, oral, BID, Andrew Aranda MD, 6.25 mg at 07/23/24 0832    mycophenolate (Cellcept) capsule 1,000 mg, 1,000 mg, oral, BID, Andrew Aranda MD, 1,000 mg at 07/23/24 0833    oxygen (O2) therapy, , inhalation, Continuous PRN - O2/gases, Andrew Aranda MD, 30 percent at 07/20/24 0850    pantoprazole (ProtoNix) injection 40 mg, 40 mg, intravenous, Daily, Andrew Aranda MD, 40 mg at 07/23/24 0832    polyethylene glycol (Glycolax, Miralax) packet 17 g, 17 g, oral, Daily, Andrew Aranda MD, 17 g at 07/21/24 0848    risperiDONE (RisperDAL) tablet 3 mg, 3 mg, oral, BID, Andrew Aranda MD, 3 mg at 07/22/24 2003    sertraline (Zoloft) tablet 25 mg, 25 mg, oral, Nightly, Andrew Aranda MD, 25 mg at 07/22/24 2003    [Held by provider] sodium chloride (Ocean) 0.65 % nasal spray 2 spray, 2 spray, Each Nostril, TID, Cheko Angel DO    thiamine (Vitamin B-1) tablet 100 mg, 100 mg, oral, Daily, Andrew Aranda MD, 100 mg at 07/23/24 0832    PERTINENT ROS:  GENERAL:  positive for fatigue, poor appetite.  No fever/chills  RESPIRATORY:  positive for shortness of breath.  Negative for cough, wheezing.  CARDIOVASCULAR:   Negative for chest pain or palpitation.  GI:  Negative for abdominal pain, diarrhea, heartburn, nausea, vomiting  : + ruano     Physical Exam:  Vital signs in last 24 hours:  Temp:  [35.9 °C (96.6 °F)-36.4 °C (97.5 °F)] 36.4 °C (97.5 °F)  Heart Rate:  [60-84] 60  Resp:  [21-29] 21  BP: (123-170)/() 166/77    General: Awake, on venturi mask  HEENT:  + nasal packing   NECK:  no  JVD, no carotid bruit, supple, no cervical mass or thyromegaly  LUNGS;  Diminished breath sounds, no Rales  CV:  Distant, regular rate and rhythm, no murmurs  ABDOMEN:  abdomen soft, nontender, BS normal, no masses or organomegaly  EDEMA:  no lower  extremity edema/dependent edema  SKIN:  dry and normal turgor, no clubbing, cyanosis or petechia.  No rashes noted  : + ruano     Intake/Output last 3 shifts:  I/O last 3 completed shifts:  In: 3572 (34.4 mL/kg) [P.O.:1230; Blood:350; IV Piggyback:350]  Out: 1300 (12.5 mL/kg) [Urine:1300 (0.3 mL/kg/hr)]  Weight: 103.9 kg     DATA:  Diagnotic tests reviewed for Todays visit:  Results from last 7 days   Lab Units 07/23/24  0456   WBC AUTO x10*3/uL 12.7*   RBC AUTO x10*6/uL 3.18*   HEMOGLOBIN g/dL 9.0*   HEMATOCRIT % 30.4*     Results from last 7 days   Lab Units 07/23/24  0457   SODIUM mmol/L 137   POTASSIUM mmol/L 4.3   CHLORIDE mmol/L 106   CO2 mmol/L 19*   BUN mg/dL 32*   CREATININE mg/dL 1.51*   CALCIUM mg/dL 8.1*   PHOSPHORUS mg/dL 3.5   MAGNESIUM mg/dL 2.03   BILIRUBIN TOTAL mg/dL 0.6   ALT U/L 10   AST U/L 17     Results from last 7 days   Lab Units 07/21/24  1621   COLOR U  Yellow   APPEARANCE U  Turbid*   PH U  5.5   SPEC GRAV UR  1.021   PROTEIN U mg/dL 70 (1+)*   BLOOD UR  0.2 (2+)*   NITRITE U  NEGATIVE   WBC UR /HPF >50*     IMAGING: CXR reviewed in  images      SIGNATURE: Vikram Perez MD  Nephrology and Hypertension  [address] [facility] [place]  Office phone: [phone]  FAX: [phone]    This note was partially generated using the Dragon voice recognition system, and there may be some incorrect words, spelling's and punctuation that were not noted in checking the note before saving.

## 2024-07-23 NOTE — PROGRESS NOTES
Nutrition Follow Up Assessment:   Nutrition Assessment    Reason for Assessment: Provider consult order    Patient History: Bing Holliday is a 63 y.o. female presenting to ED 7/15 and admitted to ICU due to hypotension, hypoglycemia, and hypothermia. CT showing small right effusion, small ascites, and diverticulitis.  GI consulted to evaluate noting uncomplicated diverticulitis and recommending atb and outpatient colonoscopy pending GOC.     Most recent admit here was from 6/28-7/9 for AMS and abnormal labs with multifactorial sepsis and adrenal insufficiency.  She has been admitted every month since March 2024.  She has been to a number of nursing facilities and prior to last admit she was at [facility] and continues to get SNF care there.    7/18/24 Follow up note -  Chart reviewed and events noted.  Since last review HPOA had agreed to placement of corpak.  Placement was attempted on 7/16 with refusal from patient.  Pt had otolaryngology consult after corpak did get placed followed by nose bleed and removal of corpak.  Bright red bms noted yesterday per nursing notation.  GI recommends CTA but patient JED too severe at this time. Pt off pressor support since last review. Per GI notation there is no clear benefit to pursing PEG placement at this time, see note on 7/17 for details.      7/19/2024 Follow up: Chart reviewed and events noted. CCM considering PPN if pt does not pass ST eval; passed for puree level 4 with thin liquids and 1:1 feeding. ST suggesting puree as pt currently dependent on HF via VM due to recurrent epistaxis over night and 7/19 AM and unable to place nasal cannula. Per CCM to discontinue central line today; considering midline if unable to obtain peripheral access (note midline is NOT a central line).     7/23/2024 Follow up: Chart reviewed and events noted. Oral intakes <50% (historically, pt dislikes puree level 4 foods); Nephrology ordered PPN 7/22 PM 4/25%/5% at 83mL/hr.  "ST to re-evaluate today. Rhino rocket remain in right nare. Note tentative transfer to [facility] for Rheumatology evaluation.     Past Medical History: SLE, lupus cerebritis, RA, Raynaud's syndrome, hyperparathyroidism, adrenal insufficiency, COPD, DM, HTN, HLD, atrial fibrillation (not on anticoagulation), CKD, pacemaker, seizures, psychosis     Nutrition History:  Energy Intake:  (NPO)  Food and Nutrient History: 7/16: Pt unable to provide information so far this admit.  No family present.  Pt had been fed by corpak last admission however this was removed as she was tolerating a PO diet.  Diet at [facility] was LCS, regular texture per orders.  Spoke with nurse from [facility] who noted that pt has not eaten much whatsoever since the day she was admitted and often holds meds in her mouth for 15-20min before swallowing and overall has difficulty swallowing with meds and food.  Vitamin/Herbal Supplement Use: home meds include folic acid, lasix, cortef, lantus U100, humalog, melatonin, cellcept  Food Allergies/Intolerances:  None  GI Symptoms: Abdominal pain and bloody stools  Oral Problems: Chewing difficulty and Swallowing difficulty  ST passed pt for puree/thin liquids and repeat eval pending 7/23        Current Diet: Adult diet Regular; Pureed 4; Thin 0; 1:1 Feeding  Adult Clinimix Parenteral Nutrition Continuous  ADULT CLINIMIX PARENTERAL NUTRITION    Anthropometrics:  Height: 165.1 cm (5' 5\")   Weight: 104 kg (229 lb 0.9 oz) 7/23: pt net +7.35L as of 0700hrs  BMI (Calculated): 38.12  Admit 93.7kg  IBW 56.8kg        7/5/2024: 96kg 2.3% <2 weeks  4/2024: 90.9kg  1/2024: 96kg 2.4% x6 months   11/2023 105kg 11% <1 yr      Weight Change %:  Weight History / % Weight Change: Weight history shows fluctuation probably in relation to edema and variable PO intakes. Last 6 months wt range has been 190-224 lbs per chart.  Significant Weight Loss: Yes  Interpretation of Weight Loss: >5% in 1 month    Pain Assessment: 0-10  0-10 (Numeric) " Pain Score: 0 - No pain      Nutrition Significant Labs:  BMP Trend:   Results from last 7 days   Lab Units 07/23/24  0457 07/22/24  0324 07/21/24  1748 07/21/24  0705   GLUCOSE mg/dL 210* 237* 219* 203*   CALCIUM mg/dL 8.1* 7.7* 7.7* 7.8*   SODIUM mmol/L 137 137 141 141   POTASSIUM mmol/L 4.3 4.1 4.0 3.8   CO2 mmol/L 19* 23 24 19*   CHLORIDE mmol/L 106 108* 109* 111*   BUN mg/dL 32* 28* 27* 26*   CREATININE mg/dL 1.51* 1.71* 1.85* 1.77*    , A1C:  Lab Results   Component Value Date    HGBA1C 6.8 (H) 06/04/2024       Nutrition Specific Medications:  Scheduled medications  amoxicillin-pot clavulanate, 1 tablet, oral, q12h GERALD  atorvastatin, 40 mg, oral, Nightly  budesonide, 0.5 mg, nebulization, BID  [Held by provider] docusate sodium, 100 mg, oral, BID  folic acid, 1 mg, oral, Daily  [Held by provider] formoterol, 20 mcg, nebulization, BID  furosemide, 40 mg, oral, Daily  [Held by provider] heparin (porcine), 5,000 Units, subcutaneous, q8h  hydrocortisone sodium succinate, 25 mg, intravenous, q8h  insulin lispro, 0-5 Units, subcutaneous, q4h  ipratropium-albuteroL, 3 mL, nebulization, TID  levETIRAcetam, 500 mg, intravenous, q12h  [Held by provider] melatonin, 5 mg, oral, Nightly  metoprolol tartrate, 6.25 mg, oral, BID  mycophenolate, 1,000 mg, oral, BID  pantoprazole, 40 mg, intravenous, Daily  polyethylene glycol, 17 g, oral, Daily  risperiDONE, 3 mg, oral, BID  sertraline, 25 mg, oral, Nightly  [Held by provider] sodium chloride, 2 spray, Each Nostril, TID  thiamine, 100 mg, oral, Daily      Nutrition Focused Physical Exam Findings:  defer: below from initial assessment  Subcutaneous Fat Loss:   Orbital Fat Pads: Mild-Moderate (slight dark circles and slight hollowing)  Buccal Fat Pads: Mild-Moderate (flat cheeks, minimal bounce)  Triceps: Defer  Muscle Wasting:  Temporalis: Mild-Moderate (slight depression)  Pectoralis (Clavicular Region): Mild-Moderate (some protrusion of clavicle)  Deltoid/Trapezius:  Mild-Moderate (slight protrusion of acromion process)  Interosseous: Defer  Trapezius/Infraspinatus/Supraspinatus (Scapular Region): Defer  Quadriceps: Defer  Gastrocnemius: Defer  Edema:  Edema: +2 mild  Edema Location: generalized  Physical Findings:  Hair: Negative  Eyes: Negative  Mouth: Negative  Nails: Negative  Skin: Negative (fragile skin)    I/O:   Last BM Date: 07/23/24; Stool Appearance: Watery (07/23/24 1000)     Estimated Needs:   Total Energy Estimated Needs (kCal):  (9304-0616 kcal  18-22 kcal/kg of 93.7kg)  Total Protein Estimated Needs (g):  (73-90g (1.3-1.6g/kg IBW of 56.8kg))  Total Fluid Estimated Needs (mL):  (1mL/kcal/d or as per physician)          Nutrition Diagnosis   Malnutrition Diagnosis  Patient has Malnutrition Diagnosis: Yes  Diagnosis Status: Ongoing  Malnutrition Diagnosis: Moderate malnutrition related to chronic disease or condition  As Evidenced by: acute on chronic nutritional stress with indicators including NFPE showing moderate muscle wasting/fat loss, unintentional weight loss, and intakes meeting <75% of needs for > 1 month.  Additional Assessment Information: 7/23: case discussed with nurse, ST, and pharmacist; Epic chat with nurse, pharmacist, and nephrologist for PPN.          Education documentation:   7/23: met with pt at bedside; pt aware PPN very short term and needs to optimize oral intakes to meet nutrient needs. Pt reports to wish to continue with the Glucerna and likes the Magic cups (per staff, pt taking sips and bites of ONS). PT aware ST to re-evaluate today; pt hopeful for regular foods.     Nutrition Interventions/Recommendations   Nutrition Prescription:     DIET: regular/puree level 4 with thin liquids (1:1 feeds as per ST). Encourage >75% of meals as tolerated. Note NO CHO restriction as pt tends to be hypoglycemic.   SUPPLEMENT: Glucerna 3x/day and PRN for additional 660kcal and 30g pro/day; add Magic Cup 2x/d and PRN for additional 580kcal and 18g  pro/day. Encourage >75% of ONS.  PARENTERAL NUTRITION: Currently continue peripheral Clinimix (4.25%/5%) at 83mL/hr for 677kcal, 85g pro, and 1992mL volume. Please add MVI/minerals to PPN bag.   REFERRAL: Refer pt to [facility] [address] upon transfer.           Nutrition Monitoring and Evaluation   Food/Nutrient Related History Monitoring  Monitoring and Evaluation Plan: Energy intake, Enteral and parenteral nutrition intake  Energy Intake: Estimated energy intake  Criteria: >75% of estimated nutrient needs via meals/snacks/ONS  Enteral and Parenteral Nutrition Intake: Parenteral nutrition formula/solution, Parenteral nutrition intake  Criteria: PPN <72 hours; encourage oral intakes.         Biochemical Data, Medical Tests and Procedures  Monitoring and Evaluation Plan: Electrolyte/renal panel, Glucose/endocrine profile  Electrolyte and Renal Panel: Magnesium, Phosphorus, Potassium, Sodium  Criteria: lytes WNR  Glucose/Endocrine Profile: Glucose, casual, Glucose, fasting  Criteria: BG 70-180mg/dL            Time Spent/Follow-up Reminder:   Time Spent (min): 60 minutes  Last Date of Nutrition Visit: 07/23/24  Nutrition Follow-Up Needed?: 3-5 days  Follow up Comment: ks, started PPN 7/22; ck ST

## 2024-07-24 LAB
ACANTHOCYTES BLD QL SMEAR: NORMAL
ALBUMIN SERPL BCP-MCNC: 2.6 G/DL (ref 3.4–5)
ALP SERPL-CCNC: 81 U/L (ref 33–136)
ALT SERPL W P-5'-P-CCNC: 10 U/L (ref 7–45)
ANION GAP SERPL CALC-SCNC: 11 MMOL/L (ref 10–20)
AST SERPL W P-5'-P-CCNC: 13 U/L (ref 9–39)
BASOPHILS # BLD MANUAL: 0 X10*3/UL (ref 0–0.1)
BASOPHILS NFR BLD MANUAL: 0 %
BILIRUB SERPL-MCNC: 0.4 MG/DL (ref 0–1.2)
BUN SERPL-MCNC: 37 MG/DL (ref 6–23)
BURR CELLS BLD QL SMEAR: NORMAL
CALCIUM SERPL-MCNC: 7.9 MG/DL (ref 8.6–10.3)
CHLORIDE SERPL-SCNC: 104 MMOL/L (ref 98–107)
CO2 SERPL-SCNC: 26 MMOL/L (ref 21–32)
CREAT SERPL-MCNC: 1.16 MG/DL (ref 0.5–1.05)
EGFRCR SERPLBLD CKD-EPI 2021: 53 ML/MIN/1.73M*2
EOSINOPHIL # BLD MANUAL: 0 X10*3/UL (ref 0–0.7)
EOSINOPHIL NFR BLD MANUAL: 0 %
ERYTHROCYTE [DISTWIDTH] IN BLOOD BY AUTOMATED COUNT: 21.1 % (ref 11.5–14.5)
GIANT PLATELETS BLD QL SMEAR: NORMAL
GLUCOSE BLD MANUAL STRIP-MCNC: 198 MG/DL (ref 74–99)
GLUCOSE BLD MANUAL STRIP-MCNC: 217 MG/DL (ref 74–99)
GLUCOSE BLD MANUAL STRIP-MCNC: 233 MG/DL (ref 74–99)
GLUCOSE BLD MANUAL STRIP-MCNC: 242 MG/DL (ref 74–99)
GLUCOSE BLD MANUAL STRIP-MCNC: 257 MG/DL (ref 74–99)
GLUCOSE BLD MANUAL STRIP-MCNC: 277 MG/DL (ref 74–99)
GLUCOSE SERPL-MCNC: 311 MG/DL (ref 74–99)
HCT VFR BLD AUTO: 25.8 % (ref 36–46)
HGB BLD-MCNC: 8 G/DL (ref 12–16)
IMM GRANULOCYTES # BLD AUTO: 0.51 X10*3/UL (ref 0–0.7)
IMM GRANULOCYTES NFR BLD AUTO: 5.5 % (ref 0–0.9)
LYMPHOCYTES # BLD MANUAL: 1.49 X10*3/UL (ref 1.2–4.8)
LYMPHOCYTES NFR BLD MANUAL: 16 %
MAGNESIUM SERPL-MCNC: 1.81 MG/DL (ref 1.6–2.4)
MCH RBC QN AUTO: 28.7 PG (ref 26–34)
MCHC RBC AUTO-ENTMCNC: 31 G/DL (ref 32–36)
MCV RBC AUTO: 93 FL (ref 80–100)
MONOCYTES # BLD MANUAL: 0.19 X10*3/UL (ref 0.1–1)
MONOCYTES NFR BLD MANUAL: 2 %
MYELOCYTES # BLD MANUAL: 0.09 X10*3/UL
MYELOCYTES NFR BLD MANUAL: 1 %
NEUTROPHILS # BLD MANUAL: 7.35 X10*3/UL (ref 1.2–7.7)
NEUTS BAND # BLD MANUAL: 0.37 X10*3/UL (ref 0–0.7)
NEUTS BAND NFR BLD MANUAL: 4 %
NEUTS SEG # BLD MANUAL: 6.98 X10*3/UL (ref 1.2–7)
NEUTS SEG NFR BLD MANUAL: 75 %
NRBC BLD-RTO: 2.3 /100 WBCS (ref 0–0)
OVALOCYTES BLD QL SMEAR: NORMAL
PHOSPHATE SERPL-MCNC: 4.2 MG/DL (ref 2.5–4.9)
PLATELET # BLD AUTO: 85 X10*3/UL (ref 150–450)
POLYCHROMASIA BLD QL SMEAR: NORMAL
POTASSIUM SERPL-SCNC: 4.4 MMOL/L (ref 3.5–5.3)
PROT SERPL-MCNC: 5.1 G/DL (ref 6.4–8.2)
RBC # BLD AUTO: 2.79 X10*6/UL (ref 4–5.2)
RBC MORPH BLD: NORMAL
SODIUM SERPL-SCNC: 137 MMOL/L (ref 136–145)
SPHEROCYTES BLD QL SMEAR: NORMAL
TOTAL CELLS COUNTED BLD: 100
VARIANT LYMPHS # BLD MANUAL: 0.19 X10*3/UL (ref 0–0.5)
VARIANT LYMPHS NFR BLD: 2 %
WBC # BLD AUTO: 9.3 X10*3/UL (ref 4.4–11.3)

## 2024-07-24 PROCEDURE — 94640 AIRWAY INHALATION TREATMENT: CPT

## 2024-07-24 PROCEDURE — 2500000002 HC RX 250 W HCPCS SELF ADMINISTERED DRUGS (ALT 637 FOR MEDICARE OP, ALT 636 FOR OP/ED)

## 2024-07-24 PROCEDURE — 2500000005 HC RX 250 GENERAL PHARMACY W/O HCPCS

## 2024-07-24 PROCEDURE — 2060000001 HC INTERMEDIATE ICU ROOM DAILY

## 2024-07-24 PROCEDURE — 99233 SBSQ HOSP IP/OBS HIGH 50: CPT

## 2024-07-24 PROCEDURE — 2500000001 HC RX 250 WO HCPCS SELF ADMINISTERED DRUGS (ALT 637 FOR MEDICARE OP)

## 2024-07-24 PROCEDURE — 85007 BL SMEAR W/DIFF WBC COUNT: CPT

## 2024-07-24 PROCEDURE — 80053 COMPREHEN METABOLIC PANEL: CPT

## 2024-07-24 PROCEDURE — 84100 ASSAY OF PHOSPHORUS: CPT

## 2024-07-24 PROCEDURE — 2500000004 HC RX 250 GENERAL PHARMACY W/ HCPCS (ALT 636 FOR OP/ED)

## 2024-07-24 PROCEDURE — 83735 ASSAY OF MAGNESIUM: CPT

## 2024-07-24 PROCEDURE — 82947 ASSAY GLUCOSE BLOOD QUANT: CPT

## 2024-07-24 PROCEDURE — 85027 COMPLETE CBC AUTOMATED: CPT

## 2024-07-24 PROCEDURE — 36415 COLL VENOUS BLD VENIPUNCTURE: CPT

## 2024-07-24 RX ORDER — MAGNESIUM SULFATE HEPTAHYDRATE 40 MG/ML
2 INJECTION, SOLUTION INTRAVENOUS ONCE
Status: COMPLETED | OUTPATIENT
Start: 2024-07-24 | End: 2024-07-24

## 2024-07-24 RX ORDER — INSULIN LISPRO 100 [IU]/ML
0-10 INJECTION, SOLUTION INTRAVENOUS; SUBCUTANEOUS EVERY 4 HOURS
Start: 2024-07-24 | End: 2024-07-24 | Stop reason: HOSPADM

## 2024-07-24 RX ORDER — PANTOPRAZOLE SODIUM 40 MG/1
40 TABLET, DELAYED RELEASE ORAL
Start: 2024-07-24

## 2024-07-24 RX ORDER — INSULIN LISPRO 100 [IU]/ML
0-5 INJECTION, SOLUTION INTRAVENOUS; SUBCUTANEOUS EVERY 4 HOURS
Status: DISCONTINUED | OUTPATIENT
Start: 2024-07-24 | End: 2024-07-26

## 2024-07-24 RX ORDER — INSULIN LISPRO 100 [IU]/ML
0-10 INJECTION, SOLUTION INTRAVENOUS; SUBCUTANEOUS EVERY 4 HOURS
Status: DISCONTINUED | OUTPATIENT
Start: 2024-07-24 | End: 2024-07-24

## 2024-07-24 RX ORDER — LEVETIRACETAM 500 MG/1
500 TABLET ORAL 2 TIMES DAILY
Start: 2024-07-24

## 2024-07-24 RX ORDER — LEVETIRACETAM 500 MG/1
500 TABLET ORAL 2 TIMES DAILY
Status: DISCONTINUED | OUTPATIENT
Start: 2024-07-24 | End: 2024-07-30 | Stop reason: HOSPADM

## 2024-07-24 RX ORDER — PANTOPRAZOLE SODIUM 40 MG/1
40 TABLET, DELAYED RELEASE ORAL
Status: DISCONTINUED | OUTPATIENT
Start: 2024-07-24 | End: 2024-07-30 | Stop reason: HOSPADM

## 2024-07-24 RX ORDER — HYDROCORTISONE 5 MG/1
25 TABLET ORAL EVERY 8 HOURS SCHEDULED
Start: 2024-07-24 | End: 2024-08-02 | Stop reason: HOSPADM

## 2024-07-24 ASSESSMENT — COGNITIVE AND FUNCTIONAL STATUS - GENERAL
WALKING IN HOSPITAL ROOM: TOTAL
DRESSING REGULAR UPPER BODY CLOTHING: TOTAL
CLIMB 3 TO 5 STEPS WITH RAILING: TOTAL
DRESSING REGULAR LOWER BODY CLOTHING: TOTAL
TURNING FROM BACK TO SIDE WHILE IN FLAT BAD: TOTAL
MOVING TO AND FROM BED TO CHAIR: TOTAL
DRESSING REGULAR LOWER BODY CLOTHING: TOTAL
TOILETING: TOTAL
PERSONAL GROOMING: TOTAL
EATING MEALS: TOTAL
TURNING FROM BACK TO SIDE WHILE IN FLAT BAD: TOTAL
PERSONAL GROOMING: TOTAL
MOVING TO AND FROM BED TO CHAIR: TOTAL
HELP NEEDED FOR BATHING: TOTAL
STANDING UP FROM CHAIR USING ARMS: TOTAL
EATING MEALS: TOTAL
DAILY ACTIVITIY SCORE: 6
DRESSING REGULAR UPPER BODY CLOTHING: TOTAL
MOBILITY SCORE: 7
CLIMB 3 TO 5 STEPS WITH RAILING: TOTAL
MOVING FROM LYING ON BACK TO SITTING ON SIDE OF FLAT BED WITH BEDRAILS: A LOT
TOILETING: TOTAL
DAILY ACTIVITIY SCORE: 6
MOBILITY SCORE: 7
HELP NEEDED FOR BATHING: TOTAL
STANDING UP FROM CHAIR USING ARMS: TOTAL
WALKING IN HOSPITAL ROOM: TOTAL
MOVING FROM LYING ON BACK TO SITTING ON SIDE OF FLAT BED WITH BEDRAILS: A LOT

## 2024-07-24 ASSESSMENT — PAIN - FUNCTIONAL ASSESSMENT
PAIN_FUNCTIONAL_ASSESSMENT: 0-10

## 2024-07-24 ASSESSMENT — PAIN SCALES - GENERAL
PAINLEVEL_OUTOF10: 0 - NO PAIN

## 2024-07-24 NOTE — PROGRESS NOTES
INPATIENT NEPHROLOGY CONSULT PROGRESS NOTE      Patient Name: Bing Holliday MRN: [id number]  DATE of SERVICE: July 24, 2024  TIME of SERVICE: 12:23 PM  CONSULTING SERVICE: Nephrology    REASON for CONSULT: JED    SUBJECTIVE:  Patient seen and evaluated at bedside resting in bed comfortably.  Oral intake gradually improving however remains marginal.  Renal parameters better serum creatinine down to 1.2 mg deciliter from 1.5 yesterday.  Tentative plan to continue TPN for 1 more day.    SUMMARY OF STAY:  Ms. Holliday is a 63 y.o. female who presented to [facility] July 15, 2024 for evaluation of weakness noted at skilled nursing facility.  Diagnosed with septic shock, acute diverticulitis, acute diverticular bleed and admitted to the intensive care unit.  Patient with complex past medical history including lupus nephritis class V in 2011, systemic lupus erythematosus with chronic kidney disease stage IIIb,  Cerebritis and arthropathy, uncontrolled type 2 diabetes mellitus, paroxysmal atrial fibrillation, heart failure with reduced ejection fraction, coronary artery disease status post CABG 2013, COPD, pulmonary hypertension, hypertension, hyperlipidemia, GERD.    ASSESSMENT:  Acute kidney injury superimposed on chronic kidney disease stage IIIb:  -- Acute kidney injury likely ischemic related ATN in the setting of septic shock and acute GI bleed, peak serum creatinine up to 2.6 mg deciliter with drop in GFR to 20 mill per minute per 1.73 m²  --From baseline serum creatinine of 1.5 and EGFR of 40 mill per minute per 1.73 m²  --Immunocompromised host with pancytopenia  --Acute diverticulitis with diverticular bleed, s/p blood transfusion.   --Montano catheter in place no signs of obstruction  --On background CKD 3B, lupus nephritis class V  --Code status DNR/DNI no dialysis   --Patient reports that her mother passed During hemodialysis session and she does  not wish to be on hemodialysis.  --Renal parameters improving serum creatinine down to 1.2 mg deciliter     Septic shock secondary to acute diverticulitis: Resolved  -- Was hypothermic on presentation  -- Shock resolved, off pressor support     Acute hypoxic respiratory failure requiring intermittent Venturi mask     Volume: Tendency to third space due to hypoalbuminemia, CKD, diabetes patient with underlying abdominal situs.  -- Chest x-ray with findings concerning for pulmonary edema  -- Intermittent volume expansion with oncotic solution to maintain hemodynamic stability.     Acute blood loss anemia, GI team following patient at high risk to have procedure.     Immunocompromised     Systemic lupus erythematosus managed with mycophenolate and prednisone.     Adrenal insufficiency: On hydrocortisone 50 mg 3 times daily.     Pancytopenia     Pulmonary hypertension     Paroxysmal atrial fibrillation, rate controlled       PLAN:  Acute kidney injury superimposed on chronic kidney disease stage IIIb, acute kidney injury multifactorial component of ischemic related ATN, sepsis related ATN, septic shock resolved, acute kidney injury improving     Severe diverticulitis with malnutrition to continue TPN for today, oral intake is gradually improving  Epistaxis resolving and patient able to tolerate better oral intake.   Renal function panel improved serum creatinine down to 1.2 mg deciliter with EGFR of 53 mill per minute per 1.73 m2.   To Continue furosemide 40 mg p.o. daily  To cont protein supplements 3 times daily.  Strict input and output to guide volume management.     Will follow, thank you!    Medications:    Current Facility-Administered Medications:     acetaminophen (Tylenol) tablet 650 mg, 650 mg, oral, q4h PRN, Andrew Aranda MD    Adult Clinimix Parenteral Nutrition Continuous, 83 mL/hr, intravenous, Daily PN, Andrew Aranda MD, Last Rate: 83 mL/hr at 07/23/24 2023, New Bag at 07/23/24 2023    amoxicillin-pot  clavulanate (Augmentin) 875-125 mg per tablet 1 tablet, 1 tablet, oral, q12h GERALD, Andrew Aranda MD, 1 tablet at 07/24/24 0918    atorvastatin (Lipitor) tablet 40 mg, 40 mg, oral, Nightly, Andrew Aranda MD, 40 mg at 07/23/24 2022    budesonide (Pulmicort) 0.5 mg/2 mL nebulizer solution 0.5 mg, 0.5 mg, nebulization, BID, Andrew Aranda MD, 0.5 mg at 07/24/24 0824    dextrose 50 % injection 12.5 g, 12.5 g, intravenous, q15 min PRN, Andrew Aranda MD    dextrose 50 % injection 25 g, 25 g, intravenous, q15 min PRN, Andrew Aranda MD    [Held by provider] docusate sodium (Colace) capsule 100 mg, 100 mg, oral, BID, Cheko Angel DO    folic acid (Folvite) tablet 1 mg, 1 mg, oral, Daily, Andrew Aranda MD, 1 mg at 07/24/24 0918    [Held by provider] formoterol (Perforomist) 20 mcg/2 mL nebulizer solution 20 mcg, 20 mcg, nebulization, BID, Donald Busby MD, 20 mcg at 07/22/24 0943    furosemide (Lasix) tablet 40 mg, 40 mg, oral, Daily, Andrew Aranda MD, 40 mg at 07/24/24 0916    glucagon (Glucagen) injection 1 mg, 1 mg, intramuscular, q15 min PRN, Andrew Aranda MD    glucagon (Glucagen) injection 1 mg, 1 mg, intramuscular, q15 min PRN, Andrew Aranda MD    [Held by provider] heparin (porcine) injection 5,000 Units, 5,000 Units, subcutaneous, q8h, Cheko Angel DO, 5,000 Units at 07/16/24 2349    hydrocortisone sod succ (PF) (Solu-CORTEF) injection 25 mg, 25 mg, intravenous, q8h, Cheko Angel DO, 25 mg at 07/24/24 1757    insulin lispro (HumaLOG) injection 0-5 Units, 0-5 Units, subcutaneous, q4h, Cheko Angel DO, 3 Units at 07/24/24 1238    ipratropium-albuteroL (Duo-Neb) 0.5-2.5 mg/3 mL nebulizer solution 3 mL, 3 mL, nebulization, q4h PRN, Andrew Aranda MD    ipratropium-albuteroL (Duo-Neb) 0.5-2.5 mg/3 mL nebulizer solution 3 mL, 3 mL, nebulization, TID, Andrew Aranda MD, 3 mL at 07/24/24 0824    levETIRAcetam (Keppra) tablet 500 mg, 500 mg, oral, BID, Cheko Angel DO, 500 mg at 07/24/24 0915    [Held by  provider] melatonin tablet 5 mg, 5 mg, oral, Nightly, Cheko Angel DO    metoprolol tartrate (Lopressor) tablet 6.25 mg, 6.25 mg, oral, BID, Andrew Aranda MD, 6.25 mg at 07/23/24 2022    mycophenolate (Cellcept) capsule 1,000 mg, 1,000 mg, oral, BID, Andrew Aranda MD, 1,000 mg at 07/23/24 2022    oxygen (O2) therapy, , inhalation, Continuous PRN - O2/gases, Andrew Aranda MD, 30 percent at 07/20/24 0850    pantoprazole (ProtoNix) EC tablet 40 mg, 40 mg, oral, Daily before breakfast, Cheko Angel DO    polyethylene glycol (Glycolax, Miralax) packet 17 g, 17 g, oral, Daily, Andrew Aranda MD, 17 g at 07/21/24 0848    risperiDONE (RisperDAL) tablet 3 mg, 3 mg, oral, BID, Andrew Aranda MD, 3 mg at 07/24/24 0918    sertraline (Zoloft) tablet 25 mg, 25 mg, oral, Nightly, Andrew Aranda MD, 25 mg at 07/23/24 2022    [Held by provider] sodium chloride (Ocean) 0.65 % nasal spray 2 spray, 2 spray, Each Nostril, TID, Cheko Angel DO    thiamine (Vitamin B-1) tablet 100 mg, 100 mg, oral, Daily, Andrew Aranda MD, 100 mg at 07/24/24 0916    PERTINENT ROS:  GENERAL:  positive for fatigue, poor appetite.  No fever/chills  RESPIRATORY:  positive for shortness of breath.  Negative for cough, wheezing.  CARDIOVASCULAR:   Negative for chest pain or palpitation.  GI:  Negative for abdominal pain, diarrhea, heartburn, nausea, vomiting  : + ruano     Physical Exam:  Vital signs in last 24 hours:  Temp:  [35.5 °C (95.9 °F)-36 °C (96.8 °F)] 35.5 °C (95.9 °F)  Heart Rate:  [62-70] 64  Resp:  [18-37] 18  BP: (123-195)/(77-96) 184/77    General: Awake, on venturi mask  HEENT:  + nasal packing   NECK:  no  JVD, no carotid bruit, supple, no cervical mass or thyromegaly  LUNGS;  Diminished breath sounds, no Rales  CV:  Distant, regular rate and rhythm, no murmurs  ABDOMEN:  abdomen soft, nontender, BS normal, no masses or organomegaly  EDEMA:  no lower extremity edema/dependent edema  SKIN:  dry and normal turgor, no clubbing, cyanosis  or petechia.  No rashes noted  : + ruano     Intake/Output last 3 shifts:  I/O last 3 completed shifts:  In: 327.9 (3.3 mL/kg) [P.O.:120; IV Piggyback:100]  Out: 3600 (35.9 mL/kg) [Urine:3600 (1 mL/kg/hr)]  Weight: 100.3 kg     DATA:  Diagnotic tests reviewed for Todays visit:  Results from last 7 days   Lab Units 07/24/24  0452   WBC AUTO x10*3/uL 9.3   RBC AUTO x10*6/uL 2.79*   HEMOGLOBIN g/dL 8.0*   HEMATOCRIT % 25.8*     Results from last 7 days   Lab Units 07/24/24  0452   SODIUM mmol/L 137   POTASSIUM mmol/L 4.4   CHLORIDE mmol/L 104   CO2 mmol/L 26   BUN mg/dL 37*   CREATININE mg/dL 1.16*   CALCIUM mg/dL 7.9*   PHOSPHORUS mg/dL 4.2   MAGNESIUM mg/dL 1.81   BILIRUBIN TOTAL mg/dL 0.4   ALT U/L 10   AST U/L 13     Results from last 7 days   Lab Units 07/21/24  1621   COLOR U  Yellow   APPEARANCE U  Turbid*   PH U  5.5   SPEC GRAV UR  1.021   PROTEIN U mg/dL 70 (1+)*   BLOOD UR  0.2 (2+)*   NITRITE U  NEGATIVE   WBC UR /HPF >50*     IMAGING: CXR reviewed in  images      SIGNATURE: Vikram Perez MD  Nephrology and Hypertension  [address] [place]  Office phone: [phone]  FAX: [phone]    This note was partially generated using the Dragon voice recognition system, and there may be some incorrect words, spelling's and punctuation that were not noted in checking the note before saving.

## 2024-07-24 NOTE — PROGRESS NOTES
Occupational Therapy                 Therapy Communication Note    Patient Name: Bing Holliday  MRN: [id number]  Today's Date: 7/24/2024     Discipline: Occupational Therapy    Comment:  Chart review completed.  Patient is from LTC at the [facility] and is [place] [facility]

## 2024-07-24 NOTE — NURSING NOTE
Pt remained safe throughout shift. Vitals stable. No complaints of pain. Glucose 200-300 throughout shift. Transfer center called for update this AM. No bed available yet.

## 2024-07-24 NOTE — PROGRESS NOTES
Bing Holliday is a 63 y.o. female on day 9 of admission presenting with Hypothermia, initial encounter.    Subjective   NAEON. Patient seen and evaluated at bedside. She reports that she has no acute complaints. Appetite is decreased, but she is able to tolerate PO intake. Denies any fevers, chills, chest pain, shortness of breath, nausea, vomiting, or further epistaxis.    Objective     Last Recorded Vitals  /79   Pulse 62   Temp 35.9 °C (96.6 °F)   Resp 24   Wt 100 kg (221 lb 1.9 oz)   SpO2 96%   Intake/Output last 3 Shifts:    Intake/Output Summary (Last 24 hours) at 7/24/2024 0919  Last data filed at 7/24/2024 0010  Gross per 24 hour   Intake 120 ml   Output 1700 ml   Net -1580 ml     Admission Weight  Weight: 99.8 kg (220 lb 0.3 oz) (07/15/24 1040)    Daily Weight  07/24/24 : 100 kg (221 lb 1.9 oz)    Image Results  ECG 12 lead  Normal sinus rhythm  Possible Left ventricular hypertrophy with repolarization abnormality  Abnormal ECG  When compared with ECG of 18-JUL-2024 21:49, (unconfirmed)  ST no longer depressed in Anterior leads  Confirmed by Cheko Mike ([id number]) on 7/21/2024 2:43:43 PM  ECG 12 lead  Normal sinus rhythm  Possible Left ventricular hypertrophy with repolarization abnormality  Abnormal ECG  When compared with ECG of 15-JUL-2024 13:54, (unconfirmed)  Sinus rhythm has replaced Atrial fibrillation  QT has lengthened  Confirmed by Cheko Mike ([id number]) on 7/21/2024 2:43:13 PM  ECG 12 lead  Normal sinus rhythm  Minimal voltage criteria for LVH, may be normal variant  ST & T wave abnormality, consider inferolateral ischemia  Abnormal ECG  When compared with ECG of 21-JUL-2024 00:20, (unconfirmed)  Sinus rhythm has replaced Atrial fibrillation  Vent. rate has decreased BY  51 BPM  ST no longer depressed in Lateral leads  T wave inversion more evident in Inferior leads  T wave inversion less evident in Lateral leads  ECG 12 Lead  Atrial fibrillation with rapid ventricular  response  Moderate voltage criteria for LVH, may be normal variant  Marked ST abnormality, possible lateral subendocardial injury  Abnormal ECG  When compared with ECG of 18-JUL-2024 21:54, (unconfirmed)  Atrial fibrillation has replaced Sinus rhythm  ST more depressed in Lateral leads    Physical Exam  Constitutional:       General: She is not in acute distress.     Appearance: She is obese. She is ill-appearing.   HENT:      Head: Normocephalic and atraumatic.      Nose:      Comments: Rhino rocket in right nare  Eyes:      Extraocular Movements: Extraocular movements intact.   Cardiovascular:      Rate and Rhythm: Normal rate and regular rhythm.      Heart sounds: No murmur heard.     No friction rub. No gallop.   Pulmonary:      Effort: Pulmonary effort is normal. No respiratory distress.      Breath sounds: No wheezing or rales.   Abdominal:      General: Abdomen is flat. There is no distension.      Palpations: Abdomen is soft.      Tenderness: There is no abdominal tenderness. There is no guarding.   Musculoskeletal:         General: Swelling present.   Skin:     General: Skin is warm and dry.   Neurological:      Mental Status: She is alert.      Sensory: No sensory deficit.      Motor: Weakness present.   Psychiatric:         Mood and Affect: Mood normal.       Relevant Results  Scheduled medications  amoxicillin-pot clavulanate, 1 tablet, oral, q12h GERALD  atorvastatin, 40 mg, oral, Nightly  budesonide, 0.5 mg, nebulization, BID  [Held by provider] docusate sodium, 100 mg, oral, BID  folic acid, 1 mg, oral, Daily  [Held by provider] formoterol, 20 mcg, nebulization, BID  furosemide, 40 mg, oral, Daily  [Held by provider] heparin (porcine), 5,000 Units, subcutaneous, q8h  hydrocortisone, 25 mg, oral, q8h GERALD  insulin lispro, 0-10 Units, subcutaneous, q4h  ipratropium-albuteroL, 3 mL, nebulization, TID  levETIRAcetam, 500 mg, oral, BID  [Held by provider] melatonin, 5 mg, oral, Nightly  metoprolol tartrate,  6.25 mg, oral, BID  mycophenolate, 1,000 mg, oral, BID  pantoprazole, 40 mg, oral, Daily before breakfast  polyethylene glycol, 17 g, oral, Daily  risperiDONE, 3 mg, oral, BID  sertraline, 25 mg, oral, Nightly  [Held by provider] sodium chloride, 2 spray, Each Nostril, TID  thiamine, 100 mg, oral, Daily      Continuous medications  Adult Clinimix Parenteral Nutrition Continuous, 83 mL/hr, Last Rate: 83 mL/hr (07/23/24 2023)      PRN medications  PRN medications: acetaminophen, dextrose, dextrose, glucagon, glucagon, ipratropium-albuteroL, oxygen  Results for orders placed or performed during the hospital encounter of 07/15/24 (from the past 24 hour(s))   POCT GLUCOSE   Result Value Ref Range    POCT Glucose 246 (H) 74 - 99 mg/dL   POCT GLUCOSE   Result Value Ref Range    POCT Glucose 267 (H) 74 - 99 mg/dL   POCT GLUCOSE   Result Value Ref Range    POCT Glucose 277 (H) 74 - 99 mg/dL   POCT GLUCOSE   Result Value Ref Range    POCT Glucose 233 (H) 74 - 99 mg/dL   Comprehensive Metabolic Panel   Result Value Ref Range    Glucose 311 (H) 74 - 99 mg/dL    Sodium 137 136 - 145 mmol/L    Potassium 4.4 3.5 - 5.3 mmol/L    Chloride 104 98 - 107 mmol/L    Bicarbonate 26 21 - 32 mmol/L    Anion Gap 11 10 - 20 mmol/L    Urea Nitrogen 37 (H) 6 - 23 mg/dL    Creatinine 1.16 (H) 0.50 - 1.05 mg/dL    eGFR 53 (L) >60 mL/min/1.73m*2    Calcium 7.9 (L) 8.6 - 10.3 mg/dL    Albumin 2.6 (L) 3.4 - 5.0 g/dL    Alkaline Phosphatase 81 33 - 136 U/L    Total Protein 5.1 (L) 6.4 - 8.2 g/dL    AST 13 9 - 39 U/L    Bilirubin, Total 0.4 0.0 - 1.2 mg/dL    ALT 10 7 - 45 U/L   Magnesium   Result Value Ref Range    Magnesium 1.81 1.60 - 2.40 mg/dL   Phosphorus   Result Value Ref Range    Phosphorus 4.2 2.5 - 4.9 mg/dL   CBC and Auto Differential   Result Value Ref Range    WBC 9.3 4.4 - 11.3 x10*3/uL    nRBC 2.3 (H) 0.0 - 0.0 /100 WBCs    RBC 2.79 (L) 4.00 - 5.20 x10*6/uL    Hemoglobin 8.0 (L) 12.0 - 16.0 g/dL    Hematocrit 25.8 (L) 36.0 - 46.0 %     MCV 93 80 - 100 fL    MCH 28.7 26.0 - 34.0 pg    MCHC 31.0 (L) 32.0 - 36.0 g/dL    RDW 21.1 (H) 11.5 - 14.5 %    Platelets 85 (L) 150 - 450 x10*3/uL    Immature Granulocytes %, Automated 5.5 (H) 0.0 - 0.9 %    Immature Granulocytes Absolute, Automated 0.51 0.00 - 0.70 x10*3/uL   Manual Differential   Result Value Ref Range    Neutrophils %, Manual 75.0 40.0 - 80.0 %    Bands %, Manual 4.0 0.0 - 5.0 %    Lymphocytes %, Manual 16.0 13.0 - 44.0 %    Monocytes %, Manual 2.0 2.0 - 10.0 %    Eosinophils %, Manual 0.0 0.0 - 6.0 %    Basophils %, Manual 0.0 0.0 - 2.0 %    Atypical Lymphocytes %, Manual 2.0 0.0 - 2.0 %    Myelocytes %, Manual 1.0 0.0 - 0.0 %    Seg Neutrophils Absolute, Manual 6.98 1.20 - 7.00 x10*3/uL    Bands Absolute, Manual 0.37 0.00 - 0.70 x10*3/uL    Lymphocytes Absolute, Manual 1.49 1.20 - 4.80 x10*3/uL    Monocytes Absolute, Manual 0.19 0.10 - 1.00 x10*3/uL    Eosinophils Absolute, Manual 0.00 0.00 - 0.70 x10*3/uL    Basophils Absolute, Manual 0.00 0.00 - 0.10 x10*3/uL    Atypical Lymphs Absolute, Manual 0.19 0.00 - 0.50 x10*3/uL    Myelocytes Absolute, Manual 0.09 0.00 - 0.00 x10*3/uL    Total Cells Counted 100     Neutrophils Absolute, Manual 7.35 1.20 - 7.70 x10*3/uL    RBC Morphology See Below     Polychromasia Mild     Spherocytes Few     Ovalocytes Few     Katarzyna Cells Many     Acanthocytes Few     Giant Platelets Few    POCT GLUCOSE   Result Value Ref Range    POCT Glucose 242 (H) 74 - 99 mg/dL     *Note: Due to a large number of results and/or encounters for the requested time period, some results have not been displayed. A complete set of results can be found in Results Review.     No results found.    Assessment/Plan   Active Problems:  There are no active Hospital Problems.    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 Patient was found to be in shock requiring pressors due to either sepsis or adrenal insufficiency and was admitted to the ICU. There was concern for GI bleed and multiple specialists were consulted before the patient was hemodynamically stable and deemed appropriate for downgrade to the general medicine service. There was hemodynamic instability with afib RVR and the patient was upgraded back to the ICU before being stabilized and downgraded. Patient is to be transferred to [facility] due to concerns regarding underlying rheumatologic etiology causing symptoms.     # Shock 2/2 sepsis versus adrenal insufficiency  # AMS likely 2/2 above  # Hypothermia - resolved  -Pressors weaned off on 7/16 and again on 7/21  -Blood and urine cultures negative  -Cdiff and stool pathogen panel negative  -CT CAP showing diverticulitis at the hepatic flexure without abscess or obvious perforation, there are new likely inflammatory nodules in the right middle and left lower lobe, as well as new small right pleural effusion with small amount of ascites  -Repeat CXR showing mild patchy bilateral ground glass opacities, with possible minimal/small pleural effusions  -Completed course of Zosyn (7/17 - 7/22)  -Continue hydrocortisone 25 mg q8hr, will wean back to home steroids per endo  -Endocrinology consulted, there is concern that the patient is pocketing pills as she continues to present with adrenal insufficiency. Endocrine counseled the patient on the significant danger of this activity. Appreciate recs  -PT/OT  -SLP following     # Epistaxis  # Hematochezia  # Normocytic anemia  # Thrombocytopenia  -S/p 3 units pRBCs due to anemia and 1 unit platelets  -Fecal occult positive  -CT scan showing diverticulitis  -Rhino rockets removed and replaced, Merocel  sponge and afrin as needed to prevent oozing/further bleed  -Protonix 40 mg daily  -Augmentin as prophylaxis given epistaxis and rhino rocket, today is day 4 of rhino rocket plan for removal at day 5  -GI consulted, recommended supportive care, conservative management, possible CTA if active bleeding presents. Patient has poor GFR making CTA difficult to obtain, and is a poor colonoscopy candidate. Ultimately recommended continuing GOC and hospice discussions before signing off. Appreciate recs  -ENT consulted, rhino rockets to control bleeding with antibiotic prophylaxis. Appreciate recs  -Hematology consulted, recommended outpatient biopsy in 4 weeks. Appreciate recs.     # ATN 2/2 ischemia due to sepsis and GI bleed  # CKD3b  # Hypernatremia - resolved  -Creatinine on admission 2.41, baseline 1.6-1.9  -S/p 1 L of D5W  -Avoid nephrotoxic agents  -Renally dose medications  -40 mg lasix PO daily  -Montano in place, strict I/Os  -Nephrology consulted, no indication for urgent RRT. Appreciate recs     # SLE  -Case was discussed with the patients rheumatologist Dr. Hoyos, who had not seen the patient in at least a year but agreed with holding Cellcept in the setting of above.  -Resumed Cellcept after completion of abx  -There is concern that rheumatologic etiology is causing presenting symptoms, patient to be transferred to [facility]     # Afib with RVR  -Not currently on anticoagulation due to bleed risk  -Metoprolol tartrate 6.25 mg BID started  -Optimize electrolytes with goal K >4.0 and Mg > 2.0  -Telemetry     # Malnutrition  Patient has low albumin and protein and third spaces significant amount of fluid, will diurese as tolerated and highly encourage PO intake with protein supplementation.  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     The assessment and plan were discussed with the attending physician,  Cheko Angel D.O. PGY-2

## 2024-07-24 NOTE — PROGRESS NOTES
Speech-Language Pathology    SLP Adult Inpatient  Speech-Language Pathology Treatment     Patient Name: Bing Holliday  MRN: [id number]  Today's Date: 7/23/2024  Time Calculation  Start Time: 1802  Stop Time: 1816  Time Calculation (min): 14 min    Current Problem:   1. Hypothermia, initial encounter        2. Hypoglycemia  glucagon (Glucagen) 1 mg injection    glucagon (Glucagen) 1 mg injection    dextrose 50 % injection    dextrose 50 % injection    DISCONTINUED: insulin lispro (HumaLOG) 100 unit/mL injection      3. Hypotension, unspecified hypotension type  Central Line    Central Line    Critical Care    Critical Care      4. Septic shock (Multi)        5. Acute diverticulitis  piperacillin-tazobactam (Zosyn) 3.375 gram/50 mL IV    polyethylene glycol (Glycolax, Miralax) 17 gram packet      6. Acute blood loss anemia        7. Adrenal insufficiency (Multi)  hydrocortisone sod succ, PF, (Solu-CORTEF) 100 mg/2 mL injection    hydrocortisone (Cortef) 5 mg tablet      8. Systemic lupus erythematosus, organ or system involvement unspecified (Multi)  mycophenolate (Cellcept) 250 mg capsule      9. Lupus nephritis (Multi)  mycophenolate (Cellcept) 250 mg capsule      10. PAF (paroxysmal atrial fibrillation) (Multi)  metoprolol tartrate (Lopressor) 25 mg tablet      11. Insomnia, unspecified type  melatonin 5 mg tablet      12. Other hyperlipidemia  atorvastatin (Lipitor) 40 mg tablet      13. Psychosis, unspecified psychosis type (Multi)  risperiDONE (RisperDAL) 3 mg tablet      14. Seizure-like activity (Multi)  levETIRAcetam (Keppra) 500 mg/100 mL IV      15. Failure to thrive in adult  Adult Clinimix Parenteral Nutrition Continuous      16. Shortness of breath on exertion  ipratropium-albuteroL (Duo-Neb) 0.5-2.5 mg/3 mL nebulizer solution    ipratropium-albuteroL (Duo-Neb) 0.5-2.5 mg/3 mL nebulizer solution    budesonide (Pulmicort) 0.5 mg/2 mL nebulizer solution      17. Ulcerative (chronic) pancolitis with rectal  bleeding (Multi)  pantoprazole (ProtoNix) 40 mg injection    pantoprazole (ProtoNix) 40 mg EC tablet      18. Epistaxis  amoxicillin-pot clavulanate (Augmentin) 875-125 mg tablet      19. DM type 2 with diabetic peripheral neuropathy (Multi)  DISCONTINUED: insulin lispro (HumaLOG) 100 unit/mL injection      20. Seizure (Multi)  levETIRAcetam (Keppra) 500 mg tablet        Recommendations:   Diet: Soft and bite sized 6; Thin 0   Medication Administration: Crush meds in pureed carrier  Compensatory strategies: Sit upright in 90 degree position&ensure adequate alertness&maintain upright posture for 30 mins after PO&slow rate&strict oral care&crush meds in pureed carrier&small sips/bites&alternate between bites and sips&refrain from speaking during PO   Discontinue PO should pt present with S/S dysphagia     Therapeutic Swallow Intervention: Compensatory Swallow Strategies, PO trials, Pt/Caregiver Education    Assessment:  Consistencies Trialed: Pureed; Minced/Moist; Soft Bite Sized; Regular; Thin Liquids   Discussed recommended compensatory swallow techniques per 7/19/24 clinical swallow evaluation. Pt able to state 2 safe swallow strategies independently, and indicated the remainder given min cues.   Updated swallow assessment completed.  Following oral care, noted pt was continuing to dab her nose with the proffered paper towel. Noted a small amount of blood on same. Pt indicated her nose had been bleeding a little bit off/on today. RN informed.  SLP presented 3 boluses each of the above consistencies.   Oral phase was slowed but WFL.   Pharyngeal dysphagia suspect, winnie by increased WOB for regular consistency. Pt endorsed that this consistency was a little hard to chew. No other S/S dysphagia were evident throughout this session.   Pt is considered to be at risk for aspiration.   Per the results of this assessment, patient is appropriate for PO at this time.   ST to continue to follow for ongoing assessment of diet  tolerance and  generalization of safe swallow strategies.     Goals (established 7/19/24)  1. Patient will tolerate recommended PO diet absent of overt s/s of aspiration in 90% of observed therapeutic trials.  Status: Progressing Diet was upgraded this date to Soft and bite sized 6; Thin 0. Pt continues to require feeding assist.     2. Patient/caregiver will demonstrate knowledge of taught compensatory strategies and dietary consistency recommendations to optimize functional swallow function without overt clinical signs and symptoms of aspiration or dysphagia  Status: Progressing Pt with improved safe swallow strategy awareness, both with and without cues. She is aware of current diet level, and would like for it to be upgraded.[email or web address]      Subjective:  Patient was seen at bedside for skilled dysphagia treatment with family present. She was pleasant and cooperative. Pt was assisted into an upright position for PO trials. Vocal intensity variant (aphonia to adequate voicing).       Pain:  Rating 0-10: 0      Oxygen Status:    Room air     Therapeutic Swallow Intervention: Pt/Caregiver Education; PO Trials; Compensatory Swallow Techniques     Assessment:  Consistencies Trialed: Pureed; Minced/Moist; Soft Bite Sized; Regular; Thin Liquids   Discussed recommended compensatory swallow techniques per pt's 6/30/24 clinical swallow evaluation. Pt unable to indicate any strategies despite max cues, more than likely d/t varying alertness levels vs cognitive communication impairment.   Following oral care, noted pt was continuing to dab her nose with the proffered paper towel. Noted a small amount of blood on same. Pt indicated her nose had been bleeding off/on today.    SLP presented 3 boluses each of the above consistencies.   Oral phase was slowed but WFL.   Pharyngeal dysphagia suspect, winnie by increased WOB for regular consistency. Pt endorsed that this consistency was a little hard to chew. No other S/S dysphagia were  evident throughout this session.   Pt is considered to be at risk for aspiration.   Per the results of this assessment, patient is appropriate for PO at this time.   ST to continue to follow patient during inpatient stay.      Plan:  Skilled speech therapy for dysphagia treatment continues to be warranted to assess  oropharyngeal swallow function, to ensure tolerance of safest and least restrictive consistencies, to provide training and instruction regarding the use of compensatory swallow strategies and pt/caregiver education in order to reduce risk of aspiration, dehydration and malnutrition.   Frequency: 3x/wk  Duration: Current admission   SLP Discharge Recommendations: Pt to be transferred to [facility] per EMR      Oxygen Status:    O2 use: Room air     General Visit Information:  Clinical Swallow Evaluation was completed 7/19/2024. Pureed 4 and Thin 0 was recommended. Pt continues with this diet level.       Pain:  Rating 0-10: 0     Education:  Learner  patient; RN  Barriers to Learning  Acuity of illness; cognitive communication barrier  Method demonstration; verbal; visual  Education - Topic  SLP provided patient/caregiver education regarding results and recommendations of tx, goals of treatment, and plan of care. Patient verbalized questionable full comprehension, consistent with cognitive status. Education will be reinforced. SLP further coordinated with RN regarding recommendations and precautions per this assessment, with RN verbalizing understanding.  Outcome needs review/reinforcement

## 2024-07-24 NOTE — PROGRESS NOTES
Nutrition Follow Up Assessment:   Nutrition Assessment    Reason for Assessment: Provider consult order    Patient History: Bing Holliday is a 63 y.o. female presenting to ED 7/15 and admitted to ICU due to hypotension, hypoglycemia, and hypothermia. CT showing small right effusion, small ascites, and diverticulitis.  GI consulted to evaluate noting uncomplicated diverticulitis and recommending atb and outpatient colonoscopy pending GOC.     Most recent admit here was from 6/28-7/9 for AMS and abnormal labs with multifactorial sepsis and adrenal insufficiency.  She has been admitted every month since March 2024.  She has been to a number of nursing facilities and prior to last admit she was at [facility] and continues to get SNF care there.    7/18/24 Follow up note -  Chart reviewed and events noted.  Since last review HPOA had agreed to placement of corpak.  Placement was attempted on 7/16 with refusal from patient.  Pt had otolaryngology consult after corpak did get placed followed by nose bleed and removal of corpak.  Bright red bms noted yesterday per nursing notation.  GI recommends CTA but patient JED too severe at this time. Pt off pressor support since last review. Per GI notation there is no clear benefit to pursing PEG placement at this time, see note on 7/17 for details.      7/19/2024 Follow up: Chart reviewed and events noted. CCM considering PPN if pt does not pass ST eval; passed for puree level 4 with thin liquids and 1:1 feeding. ST suggesting puree as pt currently dependent on HF via VM due to recurrent epistaxis over night and 7/19 AM and unable to place nasal cannula. Per CCM to discontinue central line today; considering midline if unable to obtain peripheral access (note midline is NOT a central line).     7/23/2024 Follow up: Chart reviewed and events noted. Oral intakes <50% (historically, pt dislikes puree level 4 foods); Nephrology ordered PPN 7/22 PM 4/25%/5% at 83mL/hr.  "ST to re-evaluate today. Rhino rocket remain in right nare. Note tentative transfer to [facility] for Rheumatology evaluation.    7/24/2024 Follow up: Chart reviewed and events noted. ST eval 7/23 PM suggesting soft/ bite sized foods level 6 with continued thin liquids. Per nurse, pt refused many AM meds as well as breakfast tray. Oral intakes 7/23 <25%.     Past Medical History: SLE, lupus cerebritis, RA, Raynaud's syndrome, hyperparathyroidism, adrenal insufficiency, COPD, DM, HTN, HLD, atrial fibrillation (not on anticoagulation), CKD, pacemaker, seizures, psychosis     Nutrition History:  Energy Intake:  (NPO)  Food and Nutrient History: 7/16: Pt unable to provide information so far this admit.  No family present.  Pt had been fed by corpak last admission however this was removed as she was tolerating a PO diet.  Diet at  was LCS, regular texture per orders.  Spoke with nurse from  who noted that pt has not eaten much whatsoever since the day she was admitted and often holds meds in her mouth for 15-20min before swallowing and overall has difficulty swallowing with meds and food.  Vitamin/Herbal Supplement Use: home meds include folic acid, lasix, cortef, lantus U100, humalog, melatonin, cellcept  Food Allergies/Intolerances:  None  GI Symptoms: Abdominal pain and bloody stools  Oral Problems: Chewing difficulty and Swallowing difficulty  ST passed pt for puree/thin liquids and repeat eval pending 7/23        Current Diet: Adult Clinimix Parenteral Nutrition Continuous  ADULT CLINIMIX PARENTERAL NUTRITION  Adult diet Regular; Soft and bite sized 6; Thin 0; 1:1 Feeding    Anthropometrics:  Height: 165.1 cm (5' 5\")   Weight: 100 kg (221 lb 1.9 oz) 7/23: pt net +7.35L as of 0700hrs  BMI (Calculated): 36.8  Admit 93.7kg  IBW 56.8kg        7/5/2024: 96kg 2.3% <2 weeks  4/2024: 90.9kg  1/2024: 96kg 2.4% x6 months   11/2023 105kg 11% <1 yr      Weight Change %:  Weight History / % Weight Change: Weight history shows " fluctuation probably in relation to edema and variable PO intakes. Last 6 months wt range has been 190-224 lbs per chart.  Significant Weight Loss: Yes  Interpretation of Weight Loss: >5% in 1 month    Pain Assessment: 0-10  0-10 (Numeric) Pain Score: 0 - No pain      Nutrition Significant Labs:  BMP Trend:   Results from last 7 days   Lab Units 07/24/24  0452 07/23/24  0457 07/22/24  0324 07/21/24  1748   GLUCOSE mg/dL 311* 210* 237* 219*   CALCIUM mg/dL 7.9* 8.1* 7.7* 7.7*   SODIUM mmol/L 137 137 137 141   POTASSIUM mmol/L 4.4 4.3 4.1 4.0   CO2 mmol/L 26 19* 23 24   CHLORIDE mmol/L 104 106 108* 109*   BUN mg/dL 37* 32* 28* 27*   CREATININE mg/dL 1.16* 1.51* 1.71* 1.85*    , A1C:  Lab Results   Component Value Date    HGBA1C 6.8 (H) 06/04/2024       Nutrition Specific Medications:  Scheduled medications  amoxicillin-pot clavulanate, 1 tablet, oral, q12h GERALD  atorvastatin, 40 mg, oral, Nightly  budesonide, 0.5 mg, nebulization, BID  [Held by provider] docusate sodium, 100 mg, oral, BID  folic acid, 1 mg, oral, Daily  [Held by provider] formoterol, 20 mcg, nebulization, BID  furosemide, 40 mg, oral, Daily  [Held by provider] heparin (porcine), 5,000 Units, subcutaneous, q8h  hydrocortisone, 25 mg, oral, q8h GERALD  insulin lispro, 0-5 Units, subcutaneous, q4h  ipratropium-albuteroL, 3 mL, nebulization, TID  levETIRAcetam, 500 mg, oral, BID  [Held by provider] melatonin, 5 mg, oral, Nightly  metoprolol tartrate, 6.25 mg, oral, BID  mycophenolate, 1,000 mg, oral, BID  pantoprazole, 40 mg, oral, Daily before breakfast  polyethylene glycol, 17 g, oral, Daily  risperiDONE, 3 mg, oral, BID  sertraline, 25 mg, oral, Nightly  [Held by provider] sodium chloride, 2 spray, Each Nostril, TID  thiamine, 100 mg, oral, Daily      Nutrition Focused Physical Exam Findings:  defer: below from initial assessment  Subcutaneous Fat Loss:   Orbital Fat Pads: Mild-Moderate (slight dark circles and slight hollowing)  Buccal Fat Pads:  Mild-Moderate (flat cheeks, minimal bounce)  Triceps: Defer  Ribs: Defer  Muscle Wasting:  Temporalis: Mild-Moderate (slight depression)  Pectoralis (Clavicular Region): Mild-Moderate (some protrusion of clavicle)  Deltoid/Trapezius: Mild-Moderate (slight protrusion of acromion process)  Interosseous: Defer  Trapezius/Infraspinatus/Supraspinatus (Scapular Region): Defer  Quadriceps: Defer  Gastrocnemius: Defer  Edema:  Edema: +1 trace  Edema Location: BLE  Physical Findings:  Hair: Negative  Eyes: Negative  Mouth: Negative  Nails: Negative  Skin: Negative (skin fragile; left face skin tear, LLE wound x2)    I/O:   Last BM Date: 07/23/24; Stool Appearance: Watery (07/23/24 1000)     Estimated Needs:   Total Energy Estimated Needs (kCal):  (3021-1972 kcal  18-22 kcal/kg of 93.7kg)  Total Protein Estimated Needs (g):  (73-90g (1.3-1.6g/kg IBW of 56.8kg))  Total Fluid Estimated Needs (mL):  (1mL/kcal/d or as per physician)          Nutrition Diagnosis   Malnutrition Diagnosis  Patient has Malnutrition Diagnosis: Yes  Diagnosis Status: Ongoing  Malnutrition Diagnosis: Mild malnutrition related to chronic disease or condition  As Evidenced by: acute on chronic nutritional stress with indicators including NFPE showing moderate muscle wasting/fat loss, unintentional weight loss, and intakes meeting <75% of needs for > 1 month.  Additional Assessment Information: 7/24: case discussed with physicians and staff; plan to continue PPN for 1 more day and to further discuss plan of care with family. Pt is not appropriate for TPN (gut works); consider GI reassessment regarding G tube (initially stated pt not candidate)  Pt continues to await transfer to [facility].          Education documentation:   7/24: pt sleeping upon RD visit. Rhino rocket remains in place with plan to discontinue it 7/25.   7/23: met with pt at bedside; pt aware PPN very short term and needs to optimize oral intakes to meet nutrient needs. Pt reports to wish to  continue with the Glucerna and likes the Magic cups (per staff, pt taking sips and bites of ONS). PT aware ST to re-evaluate today; pt hopeful for regular foods.     Nutrition Interventions/Recommendations   Nutrition Prescription:     DIET: regular/soft bite sized level 6 with thin liquids. Encourage >75% of meals as tolerated. Note NO CHO restriction as pt tends to be hypoglycemic.   SUPPLEMENT: Glucerna 3x/day and PRN for additional 660kcal and 30g pro/day; add Magic Cup 2x/d and PRN for additional 580kcal and 18g pro/day. Encourage >75% of ONS.  PARENTERAL NUTRITION: Currently continue peripheral Clinimix (4.25%/5%) at 83mL/hr for 677kcal, 85g pro, and 1992mL volume. Please add MVI/minerals to PPN bag.   REFERRAL: Refer pt to [facility] RD upon transfer; too consider GI reassessment for PEG (not dobbhoff candidate due to epistaxis)--pt refusing meds and po intakes. PEG may be better access for meds as pt tends to pocket pills and/or refuse meds.           Nutrition Monitoring and Evaluation   Food/Nutrient Related History Monitoring  Monitoring and Evaluation Plan: Energy intake, Enteral and parenteral nutrition intake, Mealtime behavior  Energy Intake: Estimated energy intake  Criteria: >75% of estimated nutrient needs via meals/snacks/ONS  Enteral and Parenteral Nutrition Intake: Parenteral nutrition formula/solution, Parenteral nutrition intake  Criteria: PPN x72 hours; supervise oral intake in for safety and encouragement  Mealtime Behavior: Patient/client/caregiver fatigue during feeding process resulting in inadequate intake, Limited number of accepted foods  Criteria: avoidance of refusal or skipping of meals/snacks/ ONS         Biochemical Data, Medical Tests and Procedures  Monitoring and Evaluation Plan: Electrolyte/renal panel, Glucose/endocrine profile  Electrolyte and Renal Panel: Magnesium, Phosphorus, Potassium, Sodium  Criteria: labs WNR  Glucose/Endocrine Profile: Glucose, casual, Glucose,  fasting  Criteria: BG 70-180mg/dL            Time Spent/Follow-up Reminder:   Time Spent (min): 45 minutes  Last Date of Nutrition Visit: 07/24/24  Nutrition Follow-Up Needed?: 3-5 days  Follow up Comment: ks, tyson  PPN since 7/22.

## 2024-07-24 NOTE — PROGRESS NOTES
Physical Therapy                 Therapy Communication Note    Patient Name: Bing Holliday  MRN: [id number]  Today's Date: 7/24/2024     Discipline: Physical Therapy    Comment:  Chart review updated.  Pt is LTC resident at [facility] and awaiting transfer to [facility].

## 2024-07-25 LAB
ALBUMIN SERPL BCP-MCNC: 2.6 G/DL (ref 3.4–5)
ALP SERPL-CCNC: 86 U/L (ref 33–136)
ALT SERPL W P-5'-P-CCNC: 9 U/L (ref 7–45)
ANION GAP SERPL CALC-SCNC: 11 MMOL/L (ref 10–20)
AST SERPL W P-5'-P-CCNC: 13 U/L (ref 9–39)
BASOPHILS # BLD AUTO: 0.01 X10*3/UL (ref 0–0.1)
BASOPHILS NFR BLD AUTO: 0.1 %
BILIRUB SERPL-MCNC: 0.4 MG/DL (ref 0–1.2)
BUN SERPL-MCNC: 38 MG/DL (ref 6–23)
CALCIUM SERPL-MCNC: 8.2 MG/DL (ref 8.6–10.3)
CHLORIDE SERPL-SCNC: 104 MMOL/L (ref 98–107)
CO2 SERPL-SCNC: 28 MMOL/L (ref 21–32)
CREAT SERPL-MCNC: 1.03 MG/DL (ref 0.5–1.05)
DRVVT SCREEN TO CONFIRM RATIO: 0.97 RATIO
DRVVT/DRVVT CFM NRMLZD PPP-RTO: 1.07 RATIO
DRVVT/DRVVT CFM P DOAC NEUT NORM PPP-RTO: 0.9 RATIO
EGFRCR SERPLBLD CKD-EPI 2021: 62 ML/MIN/1.73M*2
EOSINOPHIL # BLD AUTO: 0.01 X10*3/UL (ref 0–0.7)
EOSINOPHIL NFR BLD AUTO: 0.1 %
ERYTHROCYTE [DISTWIDTH] IN BLOOD BY AUTOMATED COUNT: 21.4 % (ref 11.5–14.5)
GLUCOSE BLD MANUAL STRIP-MCNC: 222 MG/DL (ref 74–99)
GLUCOSE BLD MANUAL STRIP-MCNC: 236 MG/DL (ref 74–99)
GLUCOSE BLD MANUAL STRIP-MCNC: 237 MG/DL (ref 74–99)
GLUCOSE BLD MANUAL STRIP-MCNC: 265 MG/DL (ref 74–99)
GLUCOSE BLD MANUAL STRIP-MCNC: 313 MG/DL (ref 74–99)
GLUCOSE BLD MANUAL STRIP-MCNC: 340 MG/DL (ref 74–99)
GLUCOSE SERPL-MCNC: 267 MG/DL (ref 74–99)
HCT VFR BLD AUTO: 27.6 % (ref 36–46)
HGB BLD-MCNC: 8.3 G/DL (ref 12–16)
IMM GRANULOCYTES # BLD AUTO: 0.26 X10*3/UL (ref 0–0.7)
IMM GRANULOCYTES NFR BLD AUTO: 3 % (ref 0–0.9)
LA 2 SCREEN W REFLEX-IMP: NORMAL
LYMPHOCYTES # BLD AUTO: 1.88 X10*3/UL (ref 1.2–4.8)
LYMPHOCYTES NFR BLD AUTO: 22 %
MAGNESIUM SERPL-MCNC: 1.93 MG/DL (ref 1.6–2.4)
MCH RBC QN AUTO: 28.2 PG (ref 26–34)
MCHC RBC AUTO-ENTMCNC: 30.1 G/DL (ref 32–36)
MCV RBC AUTO: 94 FL (ref 80–100)
MONOCYTES # BLD AUTO: 0.34 X10*3/UL (ref 0.1–1)
MONOCYTES NFR BLD AUTO: 4 %
NEUTROPHILS # BLD AUTO: 6.05 X10*3/UL (ref 1.2–7.7)
NEUTROPHILS NFR BLD AUTO: 70.8 %
NORMALIZED SCT PPP-RTO: 0.59 RATIO
NRBC BLD-RTO: 2.7 /100 WBCS (ref 0–0)
PHOSPHATE SERPL-MCNC: 3.7 MG/DL (ref 2.5–4.9)
PLATELET # BLD AUTO: 96 X10*3/UL (ref 150–450)
POTASSIUM SERPL-SCNC: 4.1 MMOL/L (ref 3.5–5.3)
PROT SERPL-MCNC: 5.2 G/DL (ref 6.4–8.2)
RBC # BLD AUTO: 2.94 X10*6/UL (ref 4–5.2)
SILICA CLOTTING TIME CONFIRMATION: 1.14 RATIO
SILICA CLOTTING TIME SCREEN: 0.67 RATIO
SODIUM SERPL-SCNC: 139 MMOL/L (ref 136–145)
WBC # BLD AUTO: 8.6 X10*3/UL (ref 4.4–11.3)

## 2024-07-25 PROCEDURE — 85025 COMPLETE CBC W/AUTO DIFF WBC: CPT

## 2024-07-25 PROCEDURE — 2500000001 HC RX 250 WO HCPCS SELF ADMINISTERED DRUGS (ALT 637 FOR MEDICARE OP)

## 2024-07-25 PROCEDURE — 80053 COMPREHEN METABOLIC PANEL: CPT

## 2024-07-25 PROCEDURE — 99233 SBSQ HOSP IP/OBS HIGH 50: CPT

## 2024-07-25 PROCEDURE — 99233 SBSQ HOSP IP/OBS HIGH 50: CPT | Performed by: OTOLARYNGOLOGY

## 2024-07-25 PROCEDURE — 2500000002 HC RX 250 W HCPCS SELF ADMINISTERED DRUGS (ALT 637 FOR MEDICARE OP, ALT 636 FOR OP/ED)

## 2024-07-25 PROCEDURE — 2500000004 HC RX 250 GENERAL PHARMACY W/ HCPCS (ALT 636 FOR OP/ED)

## 2024-07-25 PROCEDURE — 94640 AIRWAY INHALATION TREATMENT: CPT

## 2024-07-25 PROCEDURE — 2060000001 HC INTERMEDIATE ICU ROOM DAILY

## 2024-07-25 PROCEDURE — 84100 ASSAY OF PHOSPHORUS: CPT

## 2024-07-25 PROCEDURE — 82947 ASSAY GLUCOSE BLOOD QUANT: CPT

## 2024-07-25 PROCEDURE — 36415 COLL VENOUS BLD VENIPUNCTURE: CPT

## 2024-07-25 PROCEDURE — 83735 ASSAY OF MAGNESIUM: CPT

## 2024-07-25 RX ORDER — AMOXICILLIN AND CLAVULANATE POTASSIUM 875; 125 MG/1; MG/1
1 TABLET, FILM COATED ORAL EVERY 12 HOURS SCHEDULED
Status: COMPLETED | OUTPATIENT
Start: 2024-07-25 | End: 2024-07-25

## 2024-07-25 ASSESSMENT — COGNITIVE AND FUNCTIONAL STATUS - GENERAL
DRESSING REGULAR UPPER BODY CLOTHING: TOTAL
EATING MEALS: TOTAL
WALKING IN HOSPITAL ROOM: TOTAL
PERSONAL GROOMING: TOTAL
EATING MEALS: TOTAL
MOVING TO AND FROM BED TO CHAIR: TOTAL
PERSONAL GROOMING: TOTAL
MOBILITY SCORE: 7
TOILETING: TOTAL
WALKING IN HOSPITAL ROOM: TOTAL
DAILY ACTIVITIY SCORE: 6
STANDING UP FROM CHAIR USING ARMS: TOTAL
CLIMB 3 TO 5 STEPS WITH RAILING: TOTAL
MOVING TO AND FROM BED TO CHAIR: TOTAL
STANDING UP FROM CHAIR USING ARMS: TOTAL
MOVING FROM LYING ON BACK TO SITTING ON SIDE OF FLAT BED WITH BEDRAILS: A LOT
MOVING FROM LYING ON BACK TO SITTING ON SIDE OF FLAT BED WITH BEDRAILS: A LOT
HELP NEEDED FOR BATHING: TOTAL
TURNING FROM BACK TO SIDE WHILE IN FLAT BAD: TOTAL
HELP NEEDED FOR BATHING: TOTAL
MOVING FROM LYING ON BACK TO SITTING ON SIDE OF FLAT BED WITH BEDRAILS: A LOT
TOILETING: TOTAL
DAILY ACTIVITIY SCORE: 6
WALKING IN HOSPITAL ROOM: TOTAL
HELP NEEDED FOR BATHING: TOTAL
MOVING TO AND FROM BED TO CHAIR: TOTAL
EATING MEALS: TOTAL
CLIMB 3 TO 5 STEPS WITH RAILING: TOTAL
MOBILITY SCORE: 7
DAILY ACTIVITIY SCORE: 6
DRESSING REGULAR UPPER BODY CLOTHING: TOTAL
CLIMB 3 TO 5 STEPS WITH RAILING: TOTAL
MOBILITY SCORE: 7
TURNING FROM BACK TO SIDE WHILE IN FLAT BAD: TOTAL
DRESSING REGULAR UPPER BODY CLOTHING: TOTAL
DRESSING REGULAR LOWER BODY CLOTHING: TOTAL
MOVING TO AND FROM BED TO CHAIR: TOTAL
EATING MEALS: TOTAL
HELP NEEDED FOR BATHING: TOTAL
STANDING UP FROM CHAIR USING ARMS: TOTAL
PERSONAL GROOMING: TOTAL
TOILETING: TOTAL
DRESSING REGULAR LOWER BODY CLOTHING: TOTAL
PERSONAL GROOMING: TOTAL
DRESSING REGULAR LOWER BODY CLOTHING: TOTAL
TOILETING: TOTAL
DAILY ACTIVITIY SCORE: 6
TURNING FROM BACK TO SIDE WHILE IN FLAT BAD: TOTAL
MOBILITY SCORE: 7
TURNING FROM BACK TO SIDE WHILE IN FLAT BAD: TOTAL
WALKING IN HOSPITAL ROOM: TOTAL
MOVING FROM LYING ON BACK TO SITTING ON SIDE OF FLAT BED WITH BEDRAILS: A LOT
DRESSING REGULAR UPPER BODY CLOTHING: TOTAL
STANDING UP FROM CHAIR USING ARMS: TOTAL
DRESSING REGULAR LOWER BODY CLOTHING: TOTAL
CLIMB 3 TO 5 STEPS WITH RAILING: TOTAL

## 2024-07-25 ASSESSMENT — PAIN SCALES - GENERAL
PAINLEVEL_OUTOF10: 0 - NO PAIN

## 2024-07-25 ASSESSMENT — PAIN - FUNCTIONAL ASSESSMENT
PAIN_FUNCTIONAL_ASSESSMENT: 0-10

## 2024-07-25 NOTE — CARE PLAN
The patient's goals for the shift include    Problem: Skin  Goal: Decreased wound size/increased tissue granulation at next dressing change  Outcome: Progressing  Flowsheets (Taken 7/25/2024 1445)  Decreased wound size/increased tissue granulation at next dressing change: Promote sleep for wound healing  Goal: Promote/optimize nutrition  Outcome: Progressing  Flowsheets (Taken 7/25/2024 1445)  Promote/optimize nutrition: Assist with feeding     Problem: Fall/Injury  Goal: Verbalize understanding of personal risk factors for fall in the hospital  Outcome: Progressing  Goal: Verbalize understanding of risk factor reduction measures to prevent injury from fall in the home  Outcome: Progressing  Goal: Use assistive devices by end of the shift  Outcome: Progressing  Goal: Pace activities to prevent fatigue by end of the shift  Outcome: Progressing     Problem: Discharge Planning  Goal: Discharge to home or other facility with appropriate resources  Outcome: Progressing     Problem: Chronic Conditions and Co-morbidities  Goal: Patient's chronic conditions and co-morbidity symptoms are monitored and maintained or improved  Outcome: Progressing     Problem: Diabetes  Goal: Achieve decreasing blood glucose levels by end of shift  Outcome: Progressing  Goal: Maintain electrolyte levels within acceptable range throughout shift  Outcome: Progressing  Goal: Learn about and adhere to nutrition recommendations by end of shift  Outcome: Progressing  Goal: Increase self care and/or family involovement by end of shift  Outcome: Progressing  Goal: Receive DSME education by end of shift  Outcome: Progressing       The clinical goals for the shift include Pt will be hemodynamically stable throughout this shift

## 2024-07-25 NOTE — PROGRESS NOTES
Bing Holliday is a 63 y.o. female on day 10 of admission presenting with Hypothermia, initial encounter.    Subjective   Patient was lethargic yesterday and did not take her oral steroids. Steroids were resumed as IV and energy level improved significantly.    Patient seen and evaluated at bedside. She reports that she has no acute complaints. Denies any fevers, chills, chest pain, shortness of breath, nausea, vomiting, or further epistaxis.    Objective     Last Recorded Vitals  BP (!) 173/99 (BP Location: Left arm, Patient Position: Lying)   Pulse 62   Temp 35.3 °C (95.5 °F) (Temporal)   Resp 19   Wt 97.4 kg (214 lb 11.7 oz)   SpO2 95%   Intake/Output last 3 Shifts:    Intake/Output Summary (Last 24 hours) at 7/25/2024 0752  Last data filed at 7/25/2024 0539  Gross per 24 hour   Intake 50 ml   Output 2700 ml   Net -2650 ml     Admission Weight  Weight: 99.8 kg (220 lb 0.3 oz) (07/15/24 1040)    Daily Weight  07/25/24 : 97.4 kg (214 lb 11.7 oz)    Image Results  ECG 12 lead  Normal sinus rhythm  Possible Left ventricular hypertrophy with repolarization abnormality  Abnormal ECG  When compared with ECG of 18-JUL-2024 21:49, (unconfirmed)  ST no longer depressed in Anterior leads  Confirmed by Cheko Mike ([id number]) on 7/21/2024 2:43:43 PM  ECG 12 lead  Normal sinus rhythm  Possible Left ventricular hypertrophy with repolarization abnormality  Abnormal ECG  When compared with ECG of 15-JUL-2024 13:54, (unconfirmed)  Sinus rhythm has replaced Atrial fibrillation  QT has lengthened  Confirmed by Cheko Mike ([id number]) on 7/21/2024 2:43:13 PM  ECG 12 lead  Normal sinus rhythm  Minimal voltage criteria for LVH, may be normal variant  ST & T wave abnormality, consider inferolateral ischemia  Abnormal ECG  When compared with ECG of 21-JUL-2024 00:20, (unconfirmed)  Sinus rhythm has replaced Atrial fibrillation  Vent. rate has decreased BY  51 BPM  ST no longer depressed in Lateral leads  T wave inversion more  evident in Inferior leads  T wave inversion less evident in Lateral leads  ECG 12 Lead  Atrial fibrillation with rapid ventricular response  Moderate voltage criteria for LVH, may be normal variant  Marked ST abnormality, possible lateral subendocardial injury  Abnormal ECG  When compared with ECG of 18-JUL-2024 21:54, (unconfirmed)  Atrial fibrillation has replaced Sinus rhythm  ST more depressed in Lateral leads    Physical Exam  Constitutional:       General: She is not in acute distress.     Appearance: She is obese. She is ill-appearing.   HENT:      Head: Normocephalic and atraumatic.      Nose:      Comments: Rhino rocket in right nare  Eyes:      Extraocular Movements: Extraocular movements intact.   Cardiovascular:      Rate and Rhythm: Normal rate and regular rhythm.      Heart sounds: No murmur heard.     No friction rub. No gallop.   Pulmonary:      Effort: Pulmonary effort is normal. No respiratory distress.      Breath sounds: No wheezing or rales.   Abdominal:      General: Abdomen is flat. There is no distension.      Palpations: Abdomen is soft.      Tenderness: There is no abdominal tenderness. There is no guarding.   Musculoskeletal:         General: Swelling present.   Skin:     General: Skin is warm and dry.      Comments: Covered skin tear on left cheek   Neurological:      Mental Status: She is alert.      Sensory: No sensory deficit.      Motor: Weakness present.   Psychiatric:         Mood and Affect: Mood normal.       Relevant Results  Scheduled medications  amoxicillin-pot clavulanate, 1 tablet, oral, q12h GERALD  atorvastatin, 40 mg, oral, Nightly  budesonide, 0.5 mg, nebulization, BID  [Held by provider] docusate sodium, 100 mg, oral, BID  folic acid, 1 mg, oral, Daily  [Held by provider] formoterol, 20 mcg, nebulization, BID  furosemide, 40 mg, oral, Daily  [Held by provider] heparin (porcine), 5,000 Units, subcutaneous, q8h  hydrocortisone sodium succinate, 25 mg, intravenous,  q8h  insulin lispro, 0-5 Units, subcutaneous, q4h  ipratropium-albuteroL, 3 mL, nebulization, TID  levETIRAcetam, 500 mg, oral, BID  [Held by provider] melatonin, 5 mg, oral, Nightly  metoprolol tartrate, 6.25 mg, oral, BID  mycophenolate, 1,000 mg, oral, BID  pantoprazole, 40 mg, oral, Daily before breakfast  polyethylene glycol, 17 g, oral, Daily  risperiDONE, 3 mg, oral, BID  sertraline, 25 mg, oral, Nightly  [Held by provider] sodium chloride, 2 spray, Each Nostril, TID  thiamine, 100 mg, oral, Daily      Continuous medications  Adult Clinimix Parenteral Nutrition Continuous, 83 mL/hr, Last Rate: 83 mL/hr (07/24/24 2111)      PRN medications  PRN medications: acetaminophen, dextrose, dextrose, glucagon, glucagon, ipratropium-albuteroL, oxygen  Results for orders placed or performed during the hospital encounter of 07/15/24 (from the past 24 hour(s))   POCT GLUCOSE   Result Value Ref Range    POCT Glucose 257 (H) 74 - 99 mg/dL   POCT GLUCOSE   Result Value Ref Range    POCT Glucose 217 (H) 74 - 99 mg/dL   POCT GLUCOSE   Result Value Ref Range    POCT Glucose 198 (H) 74 - 99 mg/dL   POCT GLUCOSE   Result Value Ref Range    POCT Glucose 265 (H) 74 - 99 mg/dL   POCT GLUCOSE   Result Value Ref Range    POCT Glucose 236 (H) 74 - 99 mg/dL   Comprehensive Metabolic Panel   Result Value Ref Range    Glucose 267 (H) 74 - 99 mg/dL    Sodium 139 136 - 145 mmol/L    Potassium 4.1 3.5 - 5.3 mmol/L    Chloride 104 98 - 107 mmol/L    Bicarbonate 28 21 - 32 mmol/L    Anion Gap 11 10 - 20 mmol/L    Urea Nitrogen 38 (H) 6 - 23 mg/dL    Creatinine 1.03 0.50 - 1.05 mg/dL    eGFR 62 >60 mL/min/1.73m*2    Calcium 8.2 (L) 8.6 - 10.3 mg/dL    Albumin 2.6 (L) 3.4 - 5.0 g/dL    Alkaline Phosphatase 86 33 - 136 U/L    Total Protein 5.2 (L) 6.4 - 8.2 g/dL    AST 13 9 - 39 U/L    Bilirubin, Total 0.4 0.0 - 1.2 mg/dL    ALT 9 7 - 45 U/L   Magnesium   Result Value Ref Range    Magnesium 1.93 1.60 - 2.40 mg/dL   Phosphorus   Result Value Ref  Range    Phosphorus 3.7 2.5 - 4.9 mg/dL   CBC and Auto Differential   Result Value Ref Range    WBC 8.6 4.4 - 11.3 x10*3/uL    nRBC 2.7 (H) 0.0 - 0.0 /100 WBCs    RBC 2.94 (L) 4.00 - 5.20 x10*6/uL    Hemoglobin 8.3 (L) 12.0 - 16.0 g/dL    Hematocrit 27.6 (L) 36.0 - 46.0 %    MCV 94 80 - 100 fL    MCH 28.2 26.0 - 34.0 pg    MCHC 30.1 (L) 32.0 - 36.0 g/dL    RDW 21.4 (H) 11.5 - 14.5 %    Platelets 96 (L) 150 - 450 x10*3/uL    Neutrophils % 70.8 40.0 - 80.0 %    Immature Granulocytes %, Automated 3.0 (H) 0.0 - 0.9 %    Lymphocytes % 22.0 13.0 - 44.0 %    Monocytes % 4.0 2.0 - 10.0 %    Eosinophils % 0.1 0.0 - 6.0 %    Basophils % 0.1 0.0 - 2.0 %    Neutrophils Absolute 6.05 1.20 - 7.70 x10*3/uL    Immature Granulocytes Absolute, Automated 0.26 0.00 - 0.70 x10*3/uL    Lymphocytes Absolute 1.88 1.20 - 4.80 x10*3/uL    Monocytes Absolute 0.34 0.10 - 1.00 x10*3/uL    Eosinophils Absolute 0.01 0.00 - 0.70 x10*3/uL    Basophils Absolute 0.01 0.00 - 0.10 x10*3/uL     *Note: Due to a large number of results and/or encounters for the requested time period, some results have not been displayed. A complete set of results can be found in Results Review.     No results found.    Assessment/Plan   Active Problems:  There are no active Hospital Problems.    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 Patient was found to be in shock requiring pressors due to either sepsis or adrenal insufficiency and was admitted to the ICU. There was concern for GI bleed and multiple specialists were consulted before the patient was hemodynamically stable and deemed appropriate for downgrade to  the general medicine service. There was hemodynamic instability with afib RVR and the patient was upgraded back to the ICU before being stabilized and downgraded. Patient is to be transferred to [facility] due to concerns regarding underlying rheumatologic etiology causing symptoms.     # Shock 2/2 sepsis versus adrenal insufficiency  # AMS likely 2/2 above  # Hypothermia - resolved  -Pressors weaned off on 7/16 and again on 7/21  -Blood and urine cultures negative  -Cdiff and stool pathogen panel negative  -CT CAP showing diverticulitis at the hepatic flexure without abscess or obvious perforation, there are new likely inflammatory nodules in the right middle and left lower lobe, as well as new small right pleural effusion with small amount of ascites  -Repeat CXR showing mild patchy bilateral ground glass opacities, with possible minimal/small pleural effusions  -Completed course of Zosyn (7/17 - 7/22)  -Continue hydrocortisone 25 mg IV q8hr, will wean back to home steroids per endo  -Endocrinology consulted, there is concern that the patient is pocketing pills as she continues to present with adrenal insufficiency. Endocrine counseled the patient on the significant danger of this activity. Appreciate recs  -PT/OT  -SLP following     # Epistaxis  # Hematochezia  # Normocytic anemia  # Thrombocytopenia  -S/p 3 units pRBCs due to anemia and 1 unit platelets  -Fecal occult positive  -CT scan showing diverticulitis  -Rhino rockets removed and replaced, Merocel sponge and afrin as needed to prevent oozing/further bleed  -Protonix 40 mg daily  -Augmentin as prophylaxis given epistaxis and rhino rocket, today is day 5 of rhino rocket plan for removal before 7 total days  -GI consulted, recommended supportive care, conservative management, possible CTA if active bleeding presents. Patient has poor GFR making CTA difficult to obtain, and is a poor colonoscopy candidate. Ultimately recommended continuing GOC and hospice  discussions before signing off. Appreciate recs  -ENT consulted, rhino rockets to control bleeding with antibiotic prophylaxis. Appreciate recs  -Hematology consulted, recommended outpatient biopsy in 4 weeks. Appreciate recs.     # ATN 2/2 ischemia due to sepsis and GI bleed  # CKD3b  # Hypernatremia - resolved  -Creatinine on admission 2.41, baseline 1.6-1.9  -S/p 1 L of D5W  -Avoid nephrotoxic agents  -Renally dose medications  -40 mg lasix PO daily  -Montano in place, strict I/Os  -Nephrology consulted, no indication for urgent RRT. Appreciate recs     # SLE  -Case was discussed with the patients rheumatologist Dr. Hoyos, who had not seen the patient in at least a year but agreed with holding Cellcept in the setting of above.  -Resumed Cellcept after completion of abx  -There is concern that rheumatologic etiology is causing presenting symptoms, patient to be transferred to [facility]     # Afib with RVR  -Not currently on anticoagulation due to bleed risk  -Metoprolol tartrate 6.25 mg BID  -Optimize electrolytes with goal K >4.0 and Mg > 2.0  -Telemetry     # Malnutrition  Patient has low albumin and protein and third spaces significant amount of fluid, will diurese as tolerated and highly encourage PO intake with protein supplementation.  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     The assessment and plan were discussed  with the attending physician,  Cheko Angel D.O. PGY-2

## 2024-07-25 NOTE — PROGRESS NOTES
Nutrition Follow Up Assessment:   Nutrition Assessment    Reason for Assessment: Provider consult order    Patient History: Bing Holliday is a 63 y.o. female presenting to ED 7/15 and admitted to ICU due to hypotension, hypoglycemia, and hypothermia. CT showing small right effusion, small ascites, and diverticulitis.  GI consulted to evaluate noting uncomplicated diverticulitis and recommending atb and outpatient colonoscopy pending GOC.     Most recent admit here was from 6/28-7/9 for AMS and abnormal labs with multifactorial sepsis and adrenal insufficiency.  She has been admitted every month since March 2024.  She has been to a number of nursing facilities and prior to last admit she was at [facility] and continues to get SNF care there.    7/18/24 Follow up note -  Chart reviewed and events noted.  Since last review HPOA had agreed to placement of corpak.  Placement was attempted on 7/16 with refusal from patient.  Pt had otolaryngology consult after corpak did get placed followed by nose bleed and removal of corpak.  Bright red bms noted yesterday per nursing notation.  GI recommends CTA but patient JED too severe at this time. Pt off pressor support since last review. Per GI notation there is no clear benefit to pursing PEG placement at this time, see note on 7/17 for details.      7/19/2024 Follow up: Chart reviewed and events noted. CCM considering PPN if pt does not pass ST eval; passed for puree level 4 with thin liquids and 1:1 feeding. ST suggesting puree as pt currently dependent on HF via VM due to recurrent epistaxis over night and 7/19 AM and unable to place nasal cannula. Per CCM to discontinue central line today; considering midline if unable to obtain peripheral access (note midline is NOT a central line).     7/23/2024 Follow up: Chart reviewed and events noted. Oral intakes <50% (historically, pt dislikes puree level 4 foods); Nephrology ordered PPN 7/22 PM 4/25%/5% at 83mL/hr.  ST to re-evaluate today. Rhino rocket remain in right nare. Note tentative transfer to [facility] for Rheumatology evaluation.    7/24/2024 Follow up: Chart reviewed and events noted. ST eval 7/23 PM suggesting soft/ bite sized foods level 6 with continued thin liquids. Per nurse, pt refused many AM meds as well as breakfast tray. Oral intakes 7/23 <25%.    7/25/2024 Follow up: Chart reviewed and events noted. Per physician notes PPN discontinued 7/25--per staff will finish bag. Pt did eat 40% of breakfast this AM yet refused lunch. Pt continues to await transfer to [facility]. Note ENT removed all nasal packing 7/25.     Past Medical History: SLE, lupus cerebritis, RA, Raynaud's syndrome, hyperparathyroidism, adrenal insufficiency, COPD, DM, HTN, HLD, atrial fibrillation (not on anticoagulation), CKD, pacemaker, seizures, psychosis     Nutrition History:  Energy Intake:  (NPO)  Food and Nutrient History: 7/16: Pt unable to provide information so far this admit.  No family present.  Pt had been fed by corpak last admission however this was removed as she was tolerating a PO diet.  Diet at [facility] was LCS, regular texture per orders.  Spoke with nurse from [facility] who noted that pt has not eaten much whatsoever since the day she was admitted and often holds meds in her mouth for 15-20min before swallowing and overall has difficulty swallowing with meds and food. (7/25: pt very sleepy at present time and staff aware. Per nurse, pt up all morning ate 40% of late breakfast yet refused lunch. Pt vitals and pulse ox stable with RN in the room.)  Vitamin/Herbal Supplement Use: home meds include folic acid, lasix, cortef, lantus U100, humalog, melatonin, cellcept  Food Allergies/Intolerances:  None  GI Symptoms: Abdominal pain and bloody stools  Oral Problems: Chewing difficulty and Swallowing difficulty  ST passed pt for soft bite sized foods level 6 on 7/23        Current Diet: ADULT CLINIMIX PARENTERAL NUTRITION  Adult diet Regular;  "Soft and bite sized 6; Thin 0; 1:1 Feeding    Anthropometrics:  Height: 165.1 cm (5' 5\")   Weight: 97.4 kg (214 lb 11.7 oz) 7/23: pt net + 2.32L on 7/25 as of 0700hrs  BMI (Calculated): 35.73  Admit 93.7kg  IBW 56.8kg        7/5/2024: 96kg 2.3% <2 weeks  4/2024: 90.9kg  1/2024: 96kg 2.4% x6 months   11/2023 105kg 11% <1 yr      Weight Change %:  Weight History / % Weight Change: Weight history shows fluctuation probably in relation to edema and variable PO intakes. Last 6 months wt range has been 190-224 lbs per chart.  Significant Weight Loss: Yes  Interpretation of Weight Loss: >5% in 1 month    Pain Assessment: 0-10  0-10 (Numeric) Pain Score: 0 - No pain      Nutrition Significant Labs:  BMP Trend:   Results from last 7 days   Lab Units 07/25/24  0438 07/24/24  0452 07/23/24  0457 07/22/24  0324   GLUCOSE mg/dL 267* 311* 210* 237*   CALCIUM mg/dL 8.2* 7.9* 8.1* 7.7*   SODIUM mmol/L 139 137 137 137   POTASSIUM mmol/L 4.1 4.4 4.3 4.1   CO2 mmol/L 28 26 19* 23   CHLORIDE mmol/L 104 104 106 108*   BUN mg/dL 38* 37* 32* 28*   CREATININE mg/dL 1.03 1.16* 1.51* 1.71*    , A1C:  Lab Results   Component Value Date    HGBA1C 6.8 (H) 06/04/2024       Nutrition Specific Medications:  Scheduled medications  amoxicillin-pot clavulanate, 1 tablet, oral, q12h GERALD  atorvastatin, 40 mg, oral, Nightly  budesonide, 0.5 mg, nebulization, BID  [Held by provider] docusate sodium, 100 mg, oral, BID  folic acid, 1 mg, oral, Daily  [Held by provider] formoterol, 20 mcg, nebulization, BID  furosemide, 40 mg, oral, Daily  [Held by provider] heparin (porcine), 5,000 Units, subcutaneous, q8h  hydrocortisone sodium succinate, 25 mg, intravenous, q8h  insulin lispro, 0-5 Units, subcutaneous, q4h  ipratropium-albuteroL, 3 mL, nebulization, TID  levETIRAcetam, 500 mg, oral, BID  [Held by provider] melatonin, 5 mg, oral, Nightly  metoprolol tartrate, 6.25 mg, oral, BID  mycophenolate, 1,000 mg, oral, BID  pantoprazole, 40 mg, oral, Daily before " breakfast  polyethylene glycol, 17 g, oral, Daily  risperiDONE, 3 mg, oral, BID  sertraline, 25 mg, oral, Nightly  [Held by provider] sodium chloride, 2 spray, Each Nostril, TID  thiamine, 100 mg, oral, Daily      Nutrition Focused Physical Exam Findings:  7/25/2024  Subcutaneous Fat Loss:   Orbital Fat Pads: Mild-Moderate (slight dark circles and slight hollowing)  Buccal Fat Pads: Mild-Moderate (flat cheeks, minimal bounce)  Triceps: Mild-Moderate (less than ample fat tissue)  Ribs: Defer  Muscle Wasting:  Temporalis: Mild-Moderate (slight depression)  Pectoralis (Clavicular Region): Mild-Moderate (some protrusion of clavicle)  Deltoid/Trapezius: Mild-Moderate (slight protrusion of acromion process)  Interosseous: Defer (hands arthritic/contracted)  Trapezius/Infraspinatus/Supraspinatus (Scapular Region): Defer  Quadriceps: Defer  Gastrocnemius: Defer  Edema:  Edema: +1 trace  Edema Location: BLE  Physical Findings:  Hair: Negative  Eyes: Negative  Mouth: Negative  Nails: Negative  Skin: Positive (skin fragile; left face skin tear, LLE wound x2)    I/O:   Last BM Date: 07/25/24; Stool Appearance: Loose (07/25/24 1010)     Estimated Needs:   Total Energy Estimated Needs (kCal):  (2333-8369 kcal  18-22 kcal/kg of 93.7kg)  Total Protein Estimated Needs (g):  (73-90g (1.3-1.6g/kg IBW of 56.8kg))  Total Fluid Estimated Needs (mL):  (1mL/kcal/d or as per physician)          Nutrition Diagnosis   Malnutrition Diagnosis  Patient has Malnutrition Diagnosis: Yes  Diagnosis Status: Ongoing  Malnutrition Diagnosis: Moderate malnutrition related to chronic disease or condition  As Evidenced by: acute on chronic nutritional stres with indicators including NFPE showing moderate muscle wasting/fat loss, unintentional weight loss, and intakes meeting <75% of needs for > 1 month.  Additional Assessment Information: 7/25: Case discussed with nurse. Per physicians, to discontinue the PPN today after current bag complete; oral intakes  remain <50%. Pt continues to await bed at [facility]. (7/25: received PPN 7/22-7/25/2024.)          Education documentation:   7/25: Pt sleeping upon RD visit; despite RN and RD repositioning pt, pt remained very sleepy.   7/24: pt sleeping upon RD visit. Rhino rocket remains in place with plan to discontinue it 7/25.   7/23: met with pt at bedside; pt aware PPN very short term and needs to optimize oral intakes to meet nutrient needs. Pt reports to wish to continue with the Glucerna and likes the Magic cups (per staff, pt taking sips and bites of ONS). PT aware ST to re-evaluate today; pt hopeful for regular foods.     Nutrition Interventions/Recommendations   Nutrition Prescription:     DIET: regular/soft bite sized level 6 with thin liquids. Encourage >75% of meals as tolerated. Note NO CHO restriction as pt tends to be hypoglycemic.   SUPPLEMENT: Glucerna 3x/day and PRN for additional 660kcal and 30g pro/day; add Magic Cup 2x/d and PRN for additional 580kcal and 18g pro/day and Gelatein SF 1x/d and PRN for additional 80kcal and 20g pro/4 oz. Encourage >75% of ONS.  PARENTERAL NUTRITION: discontinued 7/25/2024.  REFERRAL: Refer pt to [facility] RD upon transfer; too consider GI reassessment for PEG (not dobbhoff candidate due to epistaxis)--pt refusing meds and po intakes. PEG may be better access for meds as pt tends to pocket pills and/or refuse meds. Can too intermittently use PEG for nutrition needs if/when pt not eating well.           Nutrition Monitoring and Evaluation   Food/Nutrient Related History Monitoring  Monitoring and Evaluation Plan: Energy intake  Energy Intake: Estimated energy intake  Criteria: >50% of meals, >75% of ONS and PRN snacks  Enteral and Parenteral Nutrition Intake: Parenteral nutrition formula/solution, Parenteral nutrition intake  Criteria: PPN x72 hours; supervise oral intake in for safety and encouragement  Mealtime Behavior: Patient/client/caregiver fatigue during feeding process  resulting in inadequate intake  Criteria: avoidance of refusal or skipping of meals/snacks/ONS         Biochemical Data, Medical Tests and Procedures  Monitoring and Evaluation Plan: Electrolyte/renal panel, Glucose/endocrine profile  Electrolyte and Renal Panel: Magnesium, Phosphorus, Potassium, Sodium  Criteria: labs WNR  Glucose/Endocrine Profile: Glucose, casual, Glucose, fasting  Criteria: BG 70-180mg/dL            Time Spent/Follow-up Reminder:   Time Spent (min): 45 minutes  Last Date of Nutrition Visit: 07/25/24  Nutrition Follow-Up Needed?: 3-5 days  Follow up Comment: law william/danni DODSON 7/25

## 2024-07-25 NOTE — PROGRESS NOTES
Updated by nursing that patient is still currently waiting on an available bed at [facility]. Plan is still for patient to transfer when bed is available.

## 2024-07-25 NOTE — PROGRESS NOTES
Spiritual Care Visit    Clinical Encounter Type  Visited With: Patient  Routine Visit: Introduction  Continue Visiting: No    Samaritan Encounters  Samaritan Needs: Prayer                                       Taxonomy  Intended Effects: Establish rapport and connectedness, Nemo affirmation, Build relationship of care and support, Demonstrate caring and concern      Tavo Rico did the visiting.

## 2024-07-25 NOTE — PROGRESS NOTES
"    Subjective   Bing is sitting upright in bed and eating lunch.  Her nasal packing is in place.  No evidence of any active bleeding.      Objective     Physical Exam    Nose-Rhino Rocket in the right nostril and 8 cm Merocel in the left nostril.  I was able to remove both from her nose today and there was no evidence of any active bleeding.  I did suction a large amount of old bloody mucus from her nose.  Oral cavity-no evidence of any posterior clots or posterior bleeding.  Last Recorded Vitals  Blood pressure 173/83, pulse 64, temperature 36.5 °C (97.7 °F), temperature source Temporal, resp. rate 20, height 1.651 m (5' 5\"), weight 97.4 kg (214 lb 11.7 oz), SpO2 95%.  Intake/Output last 3 Shifts:  I/O last 3 completed shifts:  In: 170 (1.7 mL/kg) [P.O.:120; I.V.:50 (0.5 mL/kg)]  Out: 4400 (45.2 mL/kg) [Urine:4400 (1.3 mL/kg/hr)]  Weight: 97.4 kg     Relevant Results        Scheduled medications  amoxicillin-pot clavulanate, 1 tablet, oral, q12h GERALD  atorvastatin, 40 mg, oral, Nightly  budesonide, 0.5 mg, nebulization, BID  [Held by provider] docusate sodium, 100 mg, oral, BID  folic acid, 1 mg, oral, Daily  [Held by provider] formoterol, 20 mcg, nebulization, BID  furosemide, 40 mg, oral, Daily  [Held by provider] heparin (porcine), 5,000 Units, subcutaneous, q8h  hydrocortisone sodium succinate, 25 mg, intravenous, q8h  insulin lispro, 0-5 Units, subcutaneous, q4h  ipratropium-albuteroL, 3 mL, nebulization, TID  levETIRAcetam, 500 mg, oral, BID  [Held by provider] melatonin, 5 mg, oral, Nightly  metoprolol tartrate, 6.25 mg, oral, BID  mycophenolate, 1,000 mg, oral, BID  pantoprazole, 40 mg, oral, Daily before breakfast  polyethylene glycol, 17 g, oral, Daily  risperiDONE, 3 mg, oral, BID  sertraline, 25 mg, oral, Nightly  [Held by provider] sodium chloride, 2 spray, Each Nostril, TID  thiamine, 100 mg, oral, Daily      Continuous medications     PRN medications  PRN medications: acetaminophen, dextrose, " dextrose, glucagon, glucagon, ipratropium-albuteroL, oxygen       This patient currently has cardiac telemetry ordered; if you would like to modify or discontinue the telemetry order, click here to go to the orders activity to modify/discontinue the order.                 Assessment/Plan   Active Problems:  There are no active Hospital Problems.    Epistaxis-stable.  All nasal packing was removed today.  Okay to use Afrin nasal spray if she has any minor oozing in the future.  Continue nasal saline in the nose several times daily to keep things moist.  Finish oral antibiotics today and then stop.       I spent 30 minutes in the professional and overall care of this patient.  Will sign off for now.  Do not hesitate to call with any questions or problems.    Evaristo Rolle, DO  [phone](c)

## 2024-07-25 NOTE — PROGRESS NOTES
"    Endocrinology Inpatient Consult Progress Note     PATIENT NAME: Bing Holliday  MRN: [id number]  DATE: 7/25/2024    CONSULTING PHYSICIAN: Dr. Lambert  REASON FOR CONSULT: AI.      Interval Events     Awaiting bed at [facility] for rheumatology consultation.  The patient had no complaints this evening.  She has been eating food which is puréed.  She was tolerating applesauce when I saw her.  She has been getting her pills which are crushed in the applesauce.  The patient denies not taking her pills.  She states she is feeling better.      Physical Examination     /90 (BP Location: Left arm, Patient Position: Lying)   Pulse 66   Temp 35.6 °C (96.1 °F) (Temporal)   Resp (!) 28   Ht 1.651 m (5' 5\")   Wt 97.4 kg (214 lb 11.7 oz)   LMP  (LMP Unknown)   SpO2 100%   BMI 35.73 kg/m²   NAD  Temporal wasting.  RRR  Non labored resp  Nil pedal edema      Medications     Reviewed MAR       Data     Recent Labs and Imaging Reviewed      Assessment / Plan        # Adrenal Insufficiency  Seems to be the largest underlying problem.  The patient presents with hypoadrenal symptoms on many occasions when she is discharged to an ECF.  There was concerned about her pocketing pills.  The patient was transition to oral hydrocortisone during this admission.  She has been transition back to IV glucocorticoids.  I am unclear if there is secondary gain to her being in the hospital and pocketing pills.  Nonetheless, she could precipitate a adrenal crisis if she does not take her glucocorticoids.  I have done my best to  the patient on the same.  She is pending a bed at [facility] for rheumatology consultation.  I agree with this.  In the interim, keep her on IV glucocorticoids.  If she receives a PEG tube or other form of enteral nutrition and then we would be able to better ascertain that she is getting her medication.    I discussed the case with the patient's RN at the bedside.  She has been receiving her pills crushed in " applesauce.  She denies witnessing any pocketing behavior.     # Hypoglycemia  # Uncontrolled Type 2 Diabetes Mellitus  There has been no hypoglycemia in the last 48 hours.  Continue her modified sliding scale.  I am okay with her being hyperglycemic given her many comorbidities.  She stands a lot more to lose with hypoglycemia.     # Pancytopenia  Maybe related to MMF which has been held.     # Abnormal TFTs  I have reviewed the patient's TSH values for the last 5 years.  She has been more time in the hospital than outside of the hospital during this time.  She has multiple TSH values which I suspect have been ordered due to encephalopathy.  She does not have any TSH values greater than 10.  On admission it was in the 4 range.  I do think this is secondary to nonthyroidal illness.  Her free T4 is normal which is reassuring.  I recommend no intervention on her TSH value.     # Osteoporosis  In the setting of chronic glucocorticoid use. This will be further managed as an outpatient. She probably would benefit from a bisphosphonate, these agents are best studied with steroid-induced adynamic bone disease.         Avi Sloan, DO  Endocrinology, Diabetes, and Metabolism    Available via EP[employer] Messenger    Please excuse any typographical or unwanted errors within this documentation as voice recognition software was used to dictate this note.

## 2024-07-26 ENCOUNTER — APPOINTMENT (OUTPATIENT)
Dept: CARDIOLOGY | Facility: HOSPITAL | Age: 64
DRG: 871 | End: 2024-07-26
Payer: MEDICARE

## 2024-07-26 LAB
ALBUMIN SERPL BCP-MCNC: 2.6 G/DL (ref 3.4–5)
ALP SERPL-CCNC: 92 U/L (ref 33–136)
ALT SERPL W P-5'-P-CCNC: 9 U/L (ref 7–45)
ANION GAP SERPL CALC-SCNC: 12 MMOL/L (ref 10–20)
AST SERPL W P-5'-P-CCNC: 18 U/L (ref 9–39)
BACTERIA BLD CULT: NORMAL
BASOPHILS # BLD AUTO: 0.01 X10*3/UL (ref 0–0.1)
BASOPHILS NFR BLD AUTO: 0.1 %
BILIRUB SERPL-MCNC: 0.4 MG/DL (ref 0–1.2)
BUN SERPL-MCNC: 39 MG/DL (ref 6–23)
CALCIUM SERPL-MCNC: 8.3 MG/DL (ref 8.6–10.3)
CHLORIDE SERPL-SCNC: 106 MMOL/L (ref 98–107)
CO2 SERPL-SCNC: 28 MMOL/L (ref 21–32)
CREAT SERPL-MCNC: 1.03 MG/DL (ref 0.5–1.05)
EGFRCR SERPLBLD CKD-EPI 2021: 62 ML/MIN/1.73M*2
EOSINOPHIL # BLD AUTO: 0.01 X10*3/UL (ref 0–0.7)
EOSINOPHIL NFR BLD AUTO: 0.1 %
ERYTHROCYTE [DISTWIDTH] IN BLOOD BY AUTOMATED COUNT: 21.7 % (ref 11.5–14.5)
GLUCOSE BLD MANUAL STRIP-MCNC: 107 MG/DL (ref 74–99)
GLUCOSE BLD MANUAL STRIP-MCNC: 133 MG/DL (ref 74–99)
GLUCOSE BLD MANUAL STRIP-MCNC: 168 MG/DL (ref 74–99)
GLUCOSE BLD MANUAL STRIP-MCNC: 201 MG/DL (ref 74–99)
GLUCOSE BLD MANUAL STRIP-MCNC: 213 MG/DL (ref 74–99)
GLUCOSE BLD MANUAL STRIP-MCNC: 275 MG/DL (ref 74–99)
GLUCOSE SERPL-MCNC: 203 MG/DL (ref 74–99)
HCT VFR BLD AUTO: 29.4 % (ref 36–46)
HGB BLD-MCNC: 8.9 G/DL (ref 12–16)
IMM GRANULOCYTES # BLD AUTO: 0.16 X10*3/UL (ref 0–0.7)
IMM GRANULOCYTES NFR BLD AUTO: 2.1 % (ref 0–0.9)
LYMPHOCYTES # BLD AUTO: 1.79 X10*3/UL (ref 1.2–4.8)
LYMPHOCYTES NFR BLD AUTO: 23.3 %
MAGNESIUM SERPL-MCNC: 1.95 MG/DL (ref 1.6–2.4)
MCH RBC QN AUTO: 28.6 PG (ref 26–34)
MCHC RBC AUTO-ENTMCNC: 30.3 G/DL (ref 32–36)
MCV RBC AUTO: 95 FL (ref 80–100)
MONOCYTES # BLD AUTO: 0.46 X10*3/UL (ref 0.1–1)
MONOCYTES NFR BLD AUTO: 6 %
NEUTROPHILS # BLD AUTO: 5.24 X10*3/UL (ref 1.2–7.7)
NEUTROPHILS NFR BLD AUTO: 68.4 %
NRBC BLD-RTO: 2.5 /100 WBCS (ref 0–0)
PHOSPHATE SERPL-MCNC: 3.6 MG/DL (ref 2.5–4.9)
PLATELET # BLD AUTO: 103 X10*3/UL (ref 150–450)
POLYCHROMASIA BLD QL SMEAR: NORMAL
POTASSIUM SERPL-SCNC: 4 MMOL/L (ref 3.5–5.3)
PROT SERPL-MCNC: 5.4 G/DL (ref 6.4–8.2)
RBC # BLD AUTO: 3.11 X10*6/UL (ref 4–5.2)
RBC MORPH BLD: NORMAL
SODIUM SERPL-SCNC: 142 MMOL/L (ref 136–145)
WBC # BLD AUTO: 7.7 X10*3/UL (ref 4.4–11.3)

## 2024-07-26 PROCEDURE — 93005 ELECTROCARDIOGRAM TRACING: CPT

## 2024-07-26 PROCEDURE — 94640 AIRWAY INHALATION TREATMENT: CPT

## 2024-07-26 PROCEDURE — 83735 ASSAY OF MAGNESIUM: CPT

## 2024-07-26 PROCEDURE — 84100 ASSAY OF PHOSPHORUS: CPT

## 2024-07-26 PROCEDURE — 2500000001 HC RX 250 WO HCPCS SELF ADMINISTERED DRUGS (ALT 637 FOR MEDICARE OP)

## 2024-07-26 PROCEDURE — 2500000004 HC RX 250 GENERAL PHARMACY W/ HCPCS (ALT 636 FOR OP/ED): Performed by: INTERNAL MEDICINE

## 2024-07-26 PROCEDURE — 99233 SBSQ HOSP IP/OBS HIGH 50: CPT | Performed by: INTERNAL MEDICINE

## 2024-07-26 PROCEDURE — 92526 ORAL FUNCTION THERAPY: CPT | Mod: GN | Performed by: SPEECH-LANGUAGE PATHOLOGIST

## 2024-07-26 PROCEDURE — 2500000004 HC RX 250 GENERAL PHARMACY W/ HCPCS (ALT 636 FOR OP/ED)

## 2024-07-26 PROCEDURE — 2500000005 HC RX 250 GENERAL PHARMACY W/O HCPCS

## 2024-07-26 PROCEDURE — 85025 COMPLETE CBC W/AUTO DIFF WBC: CPT

## 2024-07-26 PROCEDURE — 36415 COLL VENOUS BLD VENIPUNCTURE: CPT

## 2024-07-26 PROCEDURE — 80053 COMPREHEN METABOLIC PANEL: CPT

## 2024-07-26 PROCEDURE — 93010 ELECTROCARDIOGRAM REPORT: CPT | Performed by: INTERNAL MEDICINE

## 2024-07-26 PROCEDURE — 2500000002 HC RX 250 W HCPCS SELF ADMINISTERED DRUGS (ALT 637 FOR MEDICARE OP, ALT 636 FOR OP/ED)

## 2024-07-26 PROCEDURE — 82947 ASSAY GLUCOSE BLOOD QUANT: CPT

## 2024-07-26 PROCEDURE — 2060000001 HC INTERMEDIATE ICU ROOM DAILY

## 2024-07-26 RX ORDER — METOPROLOL TARTRATE 1 MG/ML
5 INJECTION, SOLUTION INTRAVENOUS ONCE
Status: COMPLETED | OUTPATIENT
Start: 2024-07-26 | End: 2024-07-26

## 2024-07-26 RX ORDER — NYSTATIN 100000 [USP'U]/G
1 POWDER TOPICAL 2 TIMES DAILY
Status: DISCONTINUED | OUTPATIENT
Start: 2024-07-26 | End: 2024-07-30 | Stop reason: HOSPADM

## 2024-07-26 RX ORDER — METOPROLOL TARTRATE 25 MG/1
12.5 TABLET, FILM COATED ORAL 2 TIMES DAILY
Status: DISCONTINUED | OUTPATIENT
Start: 2024-07-26 | End: 2024-07-27

## 2024-07-26 RX ORDER — INSULIN LISPRO 100 [IU]/ML
0-5 INJECTION, SOLUTION INTRAVENOUS; SUBCUTANEOUS
Status: DISCONTINUED | OUTPATIENT
Start: 2024-07-26 | End: 2024-07-30 | Stop reason: HOSPADM

## 2024-07-26 ASSESSMENT — PAIN - FUNCTIONAL ASSESSMENT
PAIN_FUNCTIONAL_ASSESSMENT: 0-10

## 2024-07-26 ASSESSMENT — COGNITIVE AND FUNCTIONAL STATUS - GENERAL
EATING MEALS: TOTAL
MOBILITY SCORE: 7
MOVING TO AND FROM BED TO CHAIR: TOTAL
TOILETING: TOTAL
PERSONAL GROOMING: TOTAL
TURNING FROM BACK TO SIDE WHILE IN FLAT BAD: TOTAL
CLIMB 3 TO 5 STEPS WITH RAILING: TOTAL
DRESSING REGULAR UPPER BODY CLOTHING: TOTAL
STANDING UP FROM CHAIR USING ARMS: TOTAL
HELP NEEDED FOR BATHING: TOTAL
DRESSING REGULAR LOWER BODY CLOTHING: TOTAL
WALKING IN HOSPITAL ROOM: TOTAL
MOVING FROM LYING ON BACK TO SITTING ON SIDE OF FLAT BED WITH BEDRAILS: A LOT
DAILY ACTIVITIY SCORE: 6

## 2024-07-26 ASSESSMENT — PAIN SCALES - GENERAL
PAINLEVEL_OUTOF10: 0 - NO PAIN

## 2024-07-26 NOTE — PROGRESS NOTES
INPATIENT NEPHROLOGY CONSULT PROGRESS NOTE      Patient Name: Bing Holliday MRN: [id number]  DATE of SERVICE: July 25, 2024  TIME of SERVICE: 02:18 PM  CONSULTING SERVICE: Nephrology    REASON for CONSULT: JED    SUBJECTIVE:  Seen and evaluated at bedside, mentation waxes and wanes.  Oral intake remains marginal  Nasal packing removed today.  Hydrocortisone changed to IV patient with history of adrenal insufficiency.  Okay to hold mycophenolate in the inpatient setting.      SUMMARY OF STAY:  Ms. Holliday is a 63 y.o. female who presented to [facility] July 15, 2024 for evaluation of weakness noted at skilled nursing facility.  Diagnosed with septic shock, acute diverticulitis, acute diverticular bleed and admitted to the intensive care unit.  Patient with complex past medical history including lupus nephritis class V in 2011, systemic lupus erythematosus with chronic kidney disease stage IIIb,  Cerebritis and arthropathy, uncontrolled type 2 diabetes mellitus, paroxysmal atrial fibrillation, heart failure with reduced ejection fraction, coronary artery disease status post CABG 2013, COPD, pulmonary hypertension, hypertension, hyperlipidemia, GERD.    ASSESSMENT:  Acute kidney injury superimposed on chronic kidney disease stage IIIb:  -- Acute kidney injury likely ischemic related ATN in the setting of septic shock and acute GI bleed, peak serum creatinine up to 2.6 mg deciliter with drop in GFR to 20 mill per minute per 1.73 m²  --From baseline serum creatinine of 1.5 and EGFR of 40 mill per minute per 1.73 m²  --Immunocompromised host with pancytopenia  --Acute diverticulitis with diverticular bleed, s/p blood transfusion.   --Montano catheter in place no signs of obstruction  --On background CKD 3B, lupus nephritis class V  --Code status DNR/DNI no dialysis   --Patient reports that her mother passed During hemodialysis session and she does not wish  to be on hemodialysis.  --Renal parameters improving serum creatinine down to 1.2 mg deciliter     Septic shock secondary to acute diverticulitis: Resolved  -- Was hypothermic on presentation  -- Shock resolved, off pressor support     Acute hypoxic respiratory failure requiring intermittent Venturi mask     Volume: Tendency to third space due to hypoalbuminemia, CKD, diabetes patient with underlying abdominal situs.  -- Chest x-ray with findings concerning for pulmonary edema  -- Intermittent volume expansion with oncotic solution to maintain hemodynamic stability.     Acute blood loss anemia, GI team following patient at high risk to have procedure.     Immunocompromised     Systemic lupus erythematosus managed with mycophenolate and prednisone.     Adrenal insufficiency: On hydrocortisone 50 mg 3 times daily.     Pancytopenia     Pulmonary hypertension     Paroxysmal atrial fibrillation, rate controlled       PLAN:  Acute kidney injury superimposed on chronic kidney disease stage IIIb, acute kidney injury multifactorial component of ischemic related ATN, sepsis related ATN, septic shock resolved, acute kidney injury improving     Severe diverticulitis with malnutrition to hold TPN and monitor oral intake.  Hydrocortisone switched to IV 25 mg every 8 hours  Epistaxis resolved, nasal packing removed  Renal function panel improved serum creatinine down to 1.1 mg/dL  To Continue furosemide 40 mg p.o. daily, may not tolerate furosemide off TPN will monitor closely and adjust the dose.  To cont protein supplements 3 times daily.  Strict input and output to guide volume management.     Will follow, thank you!    Medications:    Current Facility-Administered Medications:     acetaminophen (Tylenol) tablet 650 mg, 650 mg, oral, q4h PRN, Andrew Aranda MD    amoxicillin-pot clavulanate (Augmentin) 875-125 mg per tablet 1 tablet, 1 tablet, oral, q12h [facility], Cheko Angel DO    atorvastatin (Lipitor) tablet 40 mg, 40 mg, oral,  Nightly, Andrwe Aranda MD, 40 mg at 07/24/24 2109    budesonide (Pulmicort) 0.5 mg/2 mL nebulizer solution 0.5 mg, 0.5 mg, nebulization, BID, Andrew Aranda MD, 0.5 mg at 07/25/24 2014    dextrose 50 % injection 12.5 g, 12.5 g, intravenous, q15 min PRN, Andrew Aranda MD    dextrose 50 % injection 25 g, 25 g, intravenous, q15 min PRN, Andrew Aranda MD    [Held by provider] docusate sodium (Colace) capsule 100 mg, 100 mg, oral, BID, Cheko Angel DO    folic acid (Folvite) tablet 1 mg, 1 mg, oral, Daily, Andrew Aranda MD, 1 mg at 07/25/24 0925    [Held by provider] formoterol (Perforomist) 20 mcg/2 mL nebulizer solution 20 mcg, 20 mcg, nebulization, BID, Donald Busby MD, 20 mcg at 07/22/24 0943    furosemide (Lasix) tablet 40 mg, 40 mg, oral, Daily, Andrew Aranda MD, 40 mg at 07/25/24 0925    glucagon (Glucagen) injection 1 mg, 1 mg, intramuscular, q15 min PRN, Andrew Aranda MD    glucagon (Glucagen) injection 1 mg, 1 mg, intramuscular, q15 min PRN, Andrew Aranda MD    [Held by provider] heparin (porcine) injection 5,000 Units, 5,000 Units, subcutaneous, q8h, Cheko Angel DO, 5,000 Units at 07/16/24 2349    hydrocortisone sod succ (PF) (Solu-CORTEF) injection 25 mg, 25 mg, intravenous, q8h, Cheko Angel DO, 25 mg at 07/25/24 1641    insulin lispro (HumaLOG) injection 0-5 Units, 0-5 Units, subcutaneous, q4h, Cheko Angel DO, 4 Units at 07/25/24 1639    ipratropium-albuteroL (Duo-Neb) 0.5-2.5 mg/3 mL nebulizer solution 3 mL, 3 mL, nebulization, q4h PRN, Andrew Aranda MD    ipratropium-albuteroL (Duo-Neb) 0.5-2.5 mg/3 mL nebulizer solution 3 mL, 3 mL, nebulization, TID, Andrew Aranda MD, 3 mL at 07/25/24 2014    levETIRAcetam (Keppra) tablet 500 mg, 500 mg, oral, BID, Cheko Angel DO, 500 mg at 07/25/24 0925    [Held by provider] melatonin tablet 5 mg, 5 mg, oral, Nightly, Cheko Angel DO    metoprolol tartrate (Lopressor) tablet 6.25 mg, 6.25 mg, oral, BID, Andrew Aranda MD, 6.25 mg at 07/24/24  2109    mycophenolate (Cellcept) capsule 1,000 mg, 1,000 mg, oral, BID, Andrew Aranda MD, 1,000 mg at 07/25/24 0938    oxygen (O2) therapy, , inhalation, Continuous PRN - O2/gases, Andrew Aranda MD, 30 percent at 07/20/24 0850    pantoprazole (ProtoNix) EC tablet 40 mg, 40 mg, oral, Daily before breakfast, Cheko Angel DO    polyethylene glycol (Glycolax, Miralax) packet 17 g, 17 g, oral, Daily, Andrew Aranda MD, 17 g at 07/21/24 0848    risperiDONE (RisperDAL) tablet 3 mg, 3 mg, oral, BID, Andrew Aranda MD, 3 mg at 07/25/24 0925    sertraline (Zoloft) tablet 25 mg, 25 mg, oral, Nightly, Andrew Aranda MD, 25 mg at 07/24/24 2109    [Held by provider] sodium chloride (Ocean) 0.65 % nasal spray 2 spray, 2 spray, Each Nostril, TID, Cheko Angel DO, 2 spray at 07/25/24 1239    thiamine (Vitamin B-1) tablet 100 mg, 100 mg, oral, Daily, Andrew Aranda MD, 100 mg at 07/25/24 0925    PERTINENT ROS:  GENERAL:  positive for fatigue, poor appetite.  No fever/chills  RESPIRATORY:  positive for shortness of breath.  Negative for cough, wheezing.  CARDIOVASCULAR:   Negative for chest pain or palpitation.  GI:  Negative for abdominal pain, diarrhea, heartburn, nausea, vomiting      Physical Exam:  Vital signs in last 24 hours:  Temp:  [35.3 °C (95.5 °F)-36.5 °C (97.7 °F)] 35.9 °C (96.6 °F)  Heart Rate:  [62-70] 70  Resp:  [17-34] 34  BP: (162-186)/(78-99) 178/85    General: Awake, not in acute distress  HEENT: Nasal packing removed  NECK:  no  JVD, no carotid bruit, supple, no cervical mass or thyromegaly  LUNGS;  Diminished breath sounds, no Rales  CV:  Distant, regular rate and rhythm, no murmurs  ABDOMEN:  abdomen soft, nontender, BS normal, no masses or organomegaly  EDEMA:  no lower extremity edema/dependent edema  SKIN:  dry and normal turgor, no clubbing, cyanosis or petechia.  No rashes noted      Intake/Output last 3 shifts:  I/O last 3 completed shifts:  In: 80 (0.8 mL/kg) [P.O.:30; I.V.:50 (0.5 mL/kg)]  Out: 3600  (37 mL/kg) [Urine:3600 (1 mL/kg/hr)]  Weight: 97.4 kg     DATA:  Diagnotic tests reviewed for Todays visit:  Results from last 7 days   Lab Units 07/25/24  0438   WBC AUTO x10*3/uL 8.6   RBC AUTO x10*6/uL 2.94*   HEMOGLOBIN g/dL 8.3*   HEMATOCRIT % 27.6*     Results from last 7 days   Lab Units 07/25/24  0438   SODIUM mmol/L 139   POTASSIUM mmol/L 4.1   CHLORIDE mmol/L 104   CO2 mmol/L 28   BUN mg/dL 38*   CREATININE mg/dL 1.03   CALCIUM mg/dL 8.2*   PHOSPHORUS mg/dL 3.7   MAGNESIUM mg/dL 1.93   BILIRUBIN TOTAL mg/dL 0.4   ALT U/L 9   AST U/L 13     Results from last 7 days   Lab Units 07/21/24  1621   COLOR U  Yellow   APPEARANCE U  Turbid*   PH U  5.5   SPEC GRAV UR  1.021   PROTEIN U mg/dL 70 (1+)*   BLOOD UR  0.2 (2+)*   NITRITE U  NEGATIVE   WBC UR /HPF >50*     IMAGING: CXR reviewed in  images      SIGNATURE: Vikram Perez MD  Nephrology and Hypertension  [address] [place]  Office phone: [phone]  FAX: [phone]    This note was partially generated using the Dragon voice recognition system, and there may be some incorrect words, spelling's and punctuation that were not noted in checking the note before saving.

## 2024-07-26 NOTE — PROGRESS NOTES
INPATIENT NEPHROLOGY CONSULT PROGRESS NOTE      Patient Name: Bing Holliday MRN: [id number]  DATE of SERVICE: July 26, 2024  TIME of SERVICE: 10:41 AM  CONSULTING SERVICE: Nephrology    REASON for CONSULT: JED    SUBJECTIVE:  Seen and evaluated at bedside, more awake.  Oral intake remains marginal  Pt pocketing pills and not swallowing meds.   May need PEG tube   Pending transfer to [facility].     SUMMARY OF STAY:  Ms. Holliday is a 63 y.o. female who presented to [facility] July 15, 2024 for evaluation of weakness noted at [facility] nursing facility.  Diagnosed with septic shock, acute diverticulitis, acute diverticular bleed and admitted to the intensive care unit.  Patient with complex past medical history including lupus nephritis class V in 2011, systemic lupus erythematosus with chronic kidney disease stage IIIb,  Cerebritis and arthropathy, uncontrolled type 2 diabetes mellitus, paroxysmal atrial fibrillation, heart failure with reduced ejection fraction, coronary artery disease status post CABG 2013, COPD, pulmonary hypertension, hypertension, hyperlipidemia, GERD.    ASSESSMENT:  Acute kidney injury superimposed on chronic kidney disease stage IIIb:  -- Acute kidney injury likely ischemic related ATN in the setting of septic shock and acute GI bleed, peak serum creatinine up to 2.6 mg deciliter with drop in GFR to 20 mill per minute per 1.73 m²  --From baseline serum creatinine of 1.5 and EGFR of 40 mill per minute per 1.73 m²  --Immunocompromised host with pancytopenia  --Acute diverticulitis with diverticular bleed, s/p blood transfusion.   --Montano catheter in place no signs of obstruction  --On background CKD 3B, lupus nephritis class V  --Code status DNR/DNI no dialysis   --Patient reports that her mother passed During hemodialysis session and she does not wish to be on hemodialysis.  --Renal parameters improving serum creatinine down to 1.2  mg deciliter     Septic shock secondary to acute diverticulitis: Resolved  -- Was hypothermic on presentation  -- Shock resolved, off pressor support     Acute hypoxic respiratory failure requiring intermittent Venturi mask     Volume: Tendency to third space due to hypoalbuminemia, CKD, diabetes patient with underlying abdominal situs.  -- Chest x-ray with findings concerning for pulmonary edema  -- Intermittent volume expansion with oncotic solution to maintain hemodynamic stability.     Acute blood loss anemia, GI team following patient at high risk to have procedure.     Immunocompromised     Systemic lupus erythematosus managed with mycophenolate and prednisone.     Adrenal insufficiency: On hydrocortisone 50 mg 3 times daily.     Pancytopenia     Pulmonary hypertension     Paroxysmal atrial fibrillation, rate controlled       PLAN:  Acute kidney injury superimposed on chronic kidney disease stage IIIb, acute kidney injury multifactorial component of ischemic related ATN, sepsis related ATN, septic shock resolved, acute kidney injury improving     Oral intake has been marginal, patient pocketing oral meds in her oral cavity and not swallowing them.  On IV hydrocortisone with improvement in mental status  Epistaxis resolved.   Renal function panel improved serum creatinine down to 1.03 mg/dL  To Continue furosemide 40 mg p.o. daily, to monitor metabolic alkalosis bicarb up to 28.  Off oral bicarbonate  To cont protein supplements 3 times daily.  Strict input and output to guide volume management.     Pending transfer to [facility]    Will follow, thank you!    Medications:    Current Facility-Administered Medications:     acetaminophen (Tylenol) tablet 650 mg, 650 mg, oral, q4h PRN, Andrew Aranda MD    atorvastatin (Lipitor) tablet 40 mg, 40 mg, oral, Nightly, Andrew Aranda MD, 40 mg at 07/25/24 2053    budesonide (Pulmicort) 0.5 mg/2 mL nebulizer solution 0.5 mg, 0.5 mg, nebulization, BID, Andrew Aranda MD,  0.5 mg at 07/26/24 0959    dextrose 50 % injection 12.5 g, 12.5 g, intravenous, q15 min PRN, Andrew Aranda MD    dextrose 50 % injection 25 g, 25 g, intravenous, q15 min PRN, Andrew Aranda MD    [Held by provider] docusate sodium (Colace) capsule 100 mg, 100 mg, oral, BID, Cheko Angel DO    folic acid (Folvite) tablet 1 mg, 1 mg, oral, Daily, Andrew Aranda MD, 1 mg at 07/26/24 0922    [Held by provider] formoterol (Perforomist) 20 mcg/2 mL nebulizer solution 20 mcg, 20 mcg, nebulization, BID, Donald Busby MD, 20 mcg at 07/22/24 0943    furosemide (Lasix) tablet 40 mg, 40 mg, oral, Daily, Andrew Aranda MD, 40 mg at 07/26/24 1048    glucagon (Glucagen) injection 1 mg, 1 mg, intramuscular, q15 min PRN, Andrew Aranda MD    glucagon (Glucagen) injection 1 mg, 1 mg, intramuscular, q15 min PRN, Andrew Aranda MD    [Held by provider] heparin (porcine) injection 5,000 Units, 5,000 Units, subcutaneous, q8h, Cheko Angel DO, 5,000 Units at 07/16/24 2349    hydrocortisone sod succ (PF) (Solu-CORTEF) injection 25 mg, 25 mg, intravenous, q12h, Avi Sloan DO, 25 mg at 07/26/24 1518    insulin lispro (HumaLOG) injection 0-5 Units, 0-5 Units, subcutaneous, Before meals & nightly, Andrew Aranda MD, 1 Units at 07/26/24 1320    ipratropium-albuteroL (Duo-Neb) 0.5-2.5 mg/3 mL nebulizer solution 3 mL, 3 mL, nebulization, q4h PRN, Andrew Aranda MD    ipratropium-albuteroL (Duo-Neb) 0.5-2.5 mg/3 mL nebulizer solution 3 mL, 3 mL, nebulization, TID, Andrew Aranda MD, 3 mL at 07/26/24 0959    levETIRAcetam (Keppra) tablet 500 mg, 500 mg, oral, BID, Cheko Angel DO, 500 mg at 07/26/24 0922    [Held by provider] melatonin tablet 5 mg, 5 mg, oral, Nightly, Cheko Angel DO    metoprolol tartrate (Lopressor) tablet 12.5 mg, 12.5 mg, oral, BID, Andrew Aranda MD, 12.5 mg at 07/26/24 0917    mycophenolate (Cellcept) capsule 1,000 mg, 1,000 mg, oral, BID, Andrew Aranda MD, 1,000 mg at 07/26/24 0923    nystatin (Mycostatin)  100,000 unit/gram powder 1 Application, 1 Application, Topical, BID, Andrew Aranda MD, 1 Application at 07/26/24 0923    oxygen (O2) therapy, , inhalation, Continuous PRN - O2/gases, Andrew Aranda MD, 30 percent at 07/20/24 0850    pantoprazole (ProtoNix) EC tablet 40 mg, 40 mg, oral, Daily before breakfast, Cheko Angel DO    [Held by provider] polyethylene glycol (Glycolax, Miralax) packet 17 g, 17 g, oral, Daily, Andrew Aranda MD, 17 g at 07/21/24 0848    risperiDONE (RisperDAL) tablet 3 mg, 3 mg, oral, BID, Andrew Aranda MD, 3 mg at 07/26/24 0923    sertraline (Zoloft) tablet 25 mg, 25 mg, oral, Nightly, Andrew Aranda MD, 25 mg at 07/25/24 2049    [Held by provider] sodium chloride (Ocean) 0.65 % nasal spray 2 spray, 2 spray, Each Nostril, TID, Cheko Angel DO, 2 spray at 07/25/24 1239    thiamine (Vitamin B-1) tablet 100 mg, 100 mg, oral, Daily, Andrew Aranda MD, 100 mg at 07/26/24 0922    PERTINENT ROS:  GENERAL:  positive for fatigue, poor appetite.  No fever/chills  RESPIRATORY:  positive for shortness of breath.  Negative for cough, wheezing.  CARDIOVASCULAR:   Negative for chest pain or palpitation.  GI:  Negative for abdominal pain, diarrhea, heartburn, nausea, vomiting      Physical Exam:  Vital signs in last 24 hours:  Temp:  [35.6 °C (96.1 °F)-35.9 °C (96.6 °F)] 35.8 °C (96.4 °F)  Heart Rate:  [] 116  Resp:  [20-34] 22  BP: (123-178)/(74-98) 152/74    General: Awake, not in acute distress  HEENT: Nasal packing removed  NECK:  no  JVD, no carotid bruit, supple, no cervical mass or thyromegaly  LUNGS;  Diminished breath sounds, no Rales  CV:  Distant, regular rate and rhythm, no murmurs  ABDOMEN:  abdomen soft, nontender, BS normal, no masses or organomegaly  EDEMA:  no lower extremity edema/dependent edema  SKIN:  dry and normal turgor, no clubbing, cyanosis or petechia.  No rashes noted      Intake/Output last 3 shifts:  I/O last 3 completed shifts:  In: 80 (0.8 mL/kg) [P.O.:80]  Out:  3700 (38.7 mL/kg) [Urine:3700 (1.1 mL/kg/hr)]  Weight: 95.7 kg     DATA:  Diagnotic tests reviewed for Todays visit:  Results from last 7 days   Lab Units 07/26/24  0409   WBC AUTO x10*3/uL 7.7   RBC AUTO x10*6/uL 3.11*   HEMOGLOBIN g/dL 8.9*   HEMATOCRIT % 29.4*     Results from last 7 days   Lab Units 07/26/24  0409   SODIUM mmol/L 142   POTASSIUM mmol/L 4.0   CHLORIDE mmol/L 106   CO2 mmol/L 28   BUN mg/dL 39*   CREATININE mg/dL 1.03   CALCIUM mg/dL 8.3*   PHOSPHORUS mg/dL 3.6   MAGNESIUM mg/dL 1.95   BILIRUBIN TOTAL mg/dL 0.4   ALT U/L 9   AST U/L 18     Results from last 7 days   Lab Units 07/21/24  1621   COLOR U  Yellow   APPEARANCE U  Turbid*   PH U  5.5   SPEC GRAV UR  1.021   PROTEIN U mg/dL 70 (1+)*   BLOOD UR  0.2 (2+)*   NITRITE U  NEGATIVE   WBC UR /HPF >50*     IMAGING: CXR reviewed in  images      SIGNATURE: Vikram Perez MD  Nephrology and Hypertension  [address] [place]  Office phone: [phone]  FAX: [phone]    This note was partially generated using the Dragon voice recognition system, and there may be some incorrect words, spelling's and punctuation that were not noted in checking the note before saving.

## 2024-07-26 NOTE — CARE PLAN
Problem: Skin  Goal: Promote/optimize nutrition  Outcome: Progressing  Flowsheets (Taken 7/26/2024 1801)  Promote/optimize nutrition: Assist with feeding     Problem: Fall/Injury  Goal: Be free from injury by end of the shift  7/26/2024 1802 by Kori Piña RN  Outcome: Progressing  7/26/2024 1802 by Kori Piña RN  Reactivated   The patient's goals for the shift include      The clinical goals for the shift include Patient will have heart rate <100 by end of shift    Patient remains in afib with VVR low 100's-120. Patient given extra dose of lopressor 5 mg IV and 500 ml LR bolus this morning. Patient continues to have a poor appetite, eating well for breakfast but refusing meals this afternoon.

## 2024-07-26 NOTE — PROGRESS NOTES
Speech-Language Pathology    SLP Adult Inpatient  Speech-Language Pathology Treatment     Patient Name: Bing Holliday  MRN: [id number]  Today's Date: 7/26/2024  Time Calculation  Start Time: 1414  Stop Time: 1423  Time Calculation (min): 9 min         Current Problem:   1. Hypothermia, initial encounter        2. Hypoglycemia  glucagon (Glucagen) 1 mg injection    glucagon (Glucagen) 1 mg injection    dextrose 50 % injection    dextrose 50 % injection    DISCONTINUED: insulin lispro (HumaLOG) 100 unit/mL injection      3. Hypotension, unspecified hypotension type  Central Line    Central Line    Critical Care    Critical Care      4. Septic shock (Multi)        5. Acute diverticulitis  piperacillin-tazobactam (Zosyn) 3.375 gram/50 mL IV    polyethylene glycol (Glycolax, Miralax) 17 gram packet      6. Acute blood loss anemia        7. Adrenal insufficiency (Multi)  hydrocortisone sod succ, PF, (Solu-CORTEF) 100 mg/2 mL injection    hydrocortisone (Cortef) 5 mg tablet      8. Systemic lupus erythematosus, organ or system involvement unspecified (Multi)  mycophenolate (Cellcept) 250 mg capsule      9. Lupus nephritis (Multi)  mycophenolate (Cellcept) 250 mg capsule      10. PAF (paroxysmal atrial fibrillation) (Multi)  metoprolol tartrate (Lopressor) 25 mg tablet      11. Insomnia, unspecified type  melatonin 5 mg tablet      12. Other hyperlipidemia  atorvastatin (Lipitor) 40 mg tablet      13. Psychosis, unspecified psychosis type (Multi)  risperiDONE (RisperDAL) 3 mg tablet      14. Seizure-like activity (Multi)  levETIRAcetam (Keppra) 500 mg/100 mL IV      15. Failure to thrive in adult  Adult Clinimix Parenteral Nutrition Continuous      16. Shortness of breath on exertion  ipratropium-albuteroL (Duo-Neb) 0.5-2.5 mg/3 mL nebulizer solution    ipratropium-albuteroL (Duo-Neb) 0.5-2.5 mg/3 mL nebulizer solution    budesonide (Pulmicort) 0.5 mg/2 mL nebulizer solution      17. Ulcerative (chronic) pancolitis with  "rectal bleeding (Multi)  pantoprazole (ProtoNix) 40 mg injection    pantoprazole (ProtoNix) 40 mg EC tablet      18. Epistaxis  DISCONTINUED: amoxicillin-pot clavulanate (Augmentin) 875-125 mg tablet      19. DM type 2 with diabetic peripheral neuropathy (Multi)  DISCONTINUED: insulin lispro (HumaLOG) 100 unit/mL injection      20. Seizure (Multi)  levETIRAcetam (Keppra) 500 mg tablet            SLP Assessment:  SLP TX Intervention Outcome: Making Progress Towards Goals  SLP Assessment Results: Other (Comment) (dysphagia)  Prognosis: Good  Patient seen at bedside with her lunch tray. Patient did not demonstrate desire for PO intake, however was agreeable to PO trials given encouragement. Patient tolerated PO trials of puree, regular solids, and thin liquids without overt s/s of aspiration. Mildly prolonged mastication time noted, with functional oral clearance. Patient reporting that she had been on a softer diet per her preference, but is agreeable to diet advancement to increase options (knowing that she can always order softer items as needed). Patient expressed fatigue throughout assessment, and will likely continue to require feeding assistance. ST to continue to follow during inpatient stay to ensure diet tolerance.  Treatment Tolerance: Patient limited by fatigue  Medical Staff Made Aware: Yes  Barriers: Comorbidities  Education Provided: Yes       Plan:  Inpatient/Swing Bed or Outpatient: Inpatient  Treatment/Interventions: Other (Comment) (Dysphagia management)  SLP TX Plan: Continue Plan of Care, Other (Comment) (frequency decreased)  SLP Plan: Skilled SLP  SLP Frequency: 1x per week  Duration: 3 weeks  SLP Discharge Recommendations: Other (Comment) (pending progress during hospital course)  Next Treatment Priority: Diet tolerance, compensatory strategy training  Discussed POC: Patient, Nursing  Patient/Caregiver Agreeable: Yes      Subjective   Patient reports feeling \"just tired\" this date.    Most " Recent Visit:  SLP Received On: 07/26/24    General Visit Information:   Reason for Referral: Suspected Oral or Pharyngeal Dysphagia  Referred By: Jaziel Tyler DO  Past Medical History Relevant to Rehab: SLE, lupus cerebritis, RA, Raynaud's syndrome, hyperparathyroidism, adrenal insufficiency, COPD, DM, HTN, HLD, atrial fibrillation (not on anticoagulation), CKD, pacemaker, seizures, psychosis  Patient Seen During This Visit: Yes  Caregiver Feedback: RN reported patient consumed most of cream of wheat this morning.  Total Number of Visits : 3  Prior to Session Communication: Bedside nurse    Baseline Assessment:  Respiratory Status:  Room air  Patient Positioning: Upright in Bed       Pain Assessment:   Pain Assessment: 0-10  0-10 (Numeric) Pain Score: 0 - No pain      Objective   Goals (established 7/19/24)  1. Patient will tolerate recommended PO diet absent of overt s/s of aspiration in 90% of observed therapeutic trials.  Status: Progressing Diet was upgraded this date to regular solids and thin liquids. Pt continues to require feeding assist.      2. Patient/caregiver will demonstrate knowledge of taught compensatory strategies and dietary consistency recommendations to optimize functional swallow function without overt clinical signs and symptoms of aspiration or dysphagia  Status: Progressing Pt with improved safe swallow strategy awareness, both with and without cues. She is agreeable to diet advancement to baseline diet.     Therapeutic Swallow:  Therapeutic Swallow Intervention : PO Trials, Caregiver Education  Solid Diet Recommendations: Regular (IDDSI Level 7)  Liquid Diet Recommendations: Thin (IDDSI Level 0)  Swallow Comments: Upright for intake, small bites/sips, assist w/ meals, slow rate, meds as best tolerated, strict oral care    Inpatient Education:  Learner  patient; RN  Barriers to Learning  Acuity of illness; cognitive communication barrier  Method demonstration; verbal; visual  Education -  Topic  SLP provided patient/caregiver education regarding results and recommendations of tx, goals of treatment, and plan of care. Patient verbalized questionable full comprehension, consistent with cognitive status. Education will be reinforced. SLP further coordinated with RN regarding recommendations and precautions per this assessment, with RN verbalizing understanding.  Outcome needs review/reinforcement

## 2024-07-26 NOTE — CARE PLAN
The clinical goals for the shift include The patient will remain hemodynamically stable, free of symptoms of Sepsis by 7/26 0700      Problem: Chronic Conditions and Co-morbidities  Goal: Patient's chronic conditions and co-morbidity symptoms are monitored and maintained or improved  Outcome: Progressing  Flowsheets (Taken 7/25/2024 2000)  Care Plan - Patient's Chronic Conditions and Co-Morbidity Symptoms are Monitored and Maintained or Improved:   Monitor and assess patient's chronic conditions and comorbid symptoms for stability, deterioration, or improvement   Collaborate with multidisciplinary team to address chronic and comorbid conditions and prevent exacerbation or deterioration     Problem: Diabetes  Goal: Achieve decreasing blood glucose levels by end of shift  Outcome: Progressing  Flowsheets (Taken 7/25/2024 2000)  Achieve decreasing blood glucose levels by end of shift: Med administration/monitoring of effect     Problem: Skin  Goal: Promote/optimize nutrition  Outcome: Progressing  Flowsheets (Taken 7/25/2024 2000)  Promote/optimize nutrition:   Assist with feeding   Monitor/record intake including meals   Offer water/supplements/favorite foods     Problem: Skin  Goal: Decreased wound size/increased tissue granulation at next dressing change  Outcome: Progressing  Flowsheets (Taken 7/25/2024 2000)  Decreased wound size/increased tissue granulation at next dressing change: Protective dressings over bony prominences   The patient has been in multiple rhythms throughout the shift; NSR with PVC's, A paced, V paced, and AF rate 90's to 120. With frequent PVC's. The patient is asymptomatic.  She has a history of PAF. Potassium level this am was 4.0 and Magnesium was 1.95.    Patient was incontinent of urine and diarrhea stool during the shift. Skin cleaned and barrier cream applied to red buttocks, Foam dressings applied to protect and heal areas affected.

## 2024-07-26 NOTE — PROGRESS NOTES
Bing Holliday is a 63 y.o. female on day 11 of admission presenting with Hypothermia, initial encounter.    Subjective   -Patient seen this morning resting comfortably in bed  -VSS WNL with exception to HR 90s-110s  -Overnight patient went into atrial fibrillation with rates 90s-110s  -Patient's labs morning significant for: Hemoglobin 8.9, platelets 103 otherwise unremarkable  -Patient denied all symptomatology pertaining to 12 point ROS.    Objective     Last Recorded Vitals  /74 (BP Location: Left arm, Patient Position: Lying)   Pulse (!) 116   Temp 35.8 °C (96.4 °F) (Temporal)   Resp 22   Wt 95.7 kg (210 lb 15.7 oz)   SpO2 99%   Intake/Output last 3 Shifts:    Intake/Output Summary (Last 24 hours) at 7/26/2024 1331  Last data filed at 7/26/2024 0830  Gross per 24 hour   Intake 50 ml   Output 2825 ml   Net -2775 ml     Admission Weight  Weight: 99.8 kg (220 lb 0.3 oz) (07/15/24 1040)    Daily Weight  07/26/24 : 95.7 kg (210 lb 15.7 oz)    Image Results  ECG 12 Lead  Atrial fibrillation with rapid ventricular response  Voltage criteria for left ventricular hypertrophy  ST & T wave abnormality, consider inferolateral ischemia or digitalis effect  Abnormal ECG  When compared with ECG of 21-JUL-2024 07:53, (unconfirmed)  Atrial fibrillation has replaced Sinus rhythm  ECG 12 Lead  Atrial fibrillation with rapid ventricular response  Moderate voltage criteria for LVH, may be normal variant ( R in aVL , Tyler product )  ST & T wave abnormality, consider inferior ischemia  Abnormal ECG  When compared with ECG of 26-JUL-2024 05:52, (unconfirmed)  T wave inversion no longer evident in Anterior leads    Physical Exam:  General: Not in acute distress, A&O x 3, alert, cooperative, ill-appearing, obese  HEENT: Normocephalic, atraumatic, EOMI, moist mucous membranes  Neck: Neck supple, trachea midline, no evidence of trauma  Cardiovascular: IRR, S1 and S2 appreciated, no murmurs rubs gallops appreciated, distal  pulses 2+ bilaterally (radial and dorsalis pedis)  Respiratory: Vesicular breath sounds appreciated bilaterally, no adventitious sounds appreciated, no increased work of breathing  GI: Abdomen soft, nondistended, nontender to palpation, bowel sounds present  Extremities: No edema appreciated in lower extremities bilaterally, no cyanosis, generalized anasarca present, generalized weakness present  Neuro: A&O X3, no focal deficits, strength and sensation intact bilaterally  Skin: Warm and dry, without lesions or rashes, covered skin tear on left cheek      Relevant Results  Scheduled medications  atorvastatin, 40 mg, oral, Nightly  budesonide, 0.5 mg, nebulization, BID  [Held by provider] docusate sodium, 100 mg, oral, BID  folic acid, 1 mg, oral, Daily  [Held by provider] formoterol, 20 mcg, nebulization, BID  furosemide, 40 mg, oral, Daily  [Held by provider] heparin (porcine), 5,000 Units, subcutaneous, q8h  hydrocortisone sodium succinate, 25 mg, intravenous, q12h  insulin lispro, 0-5 Units, subcutaneous, Before meals & nightly  ipratropium-albuteroL, 3 mL, nebulization, TID  levETIRAcetam, 500 mg, oral, BID  [Held by provider] melatonin, 5 mg, oral, Nightly  metoprolol tartrate, 12.5 mg, oral, BID  mycophenolate, 1,000 mg, oral, BID  nystatin, 1 Application, Topical, BID  pantoprazole, 40 mg, oral, Daily before breakfast  [Held by provider] polyethylene glycol, 17 g, oral, Daily  risperiDONE, 3 mg, oral, BID  sertraline, 25 mg, oral, Nightly  [Held by provider] sodium chloride, 2 spray, Each Nostril, TID  thiamine, 100 mg, oral, Daily      Continuous medications       PRN medications  PRN medications: acetaminophen, dextrose, dextrose, glucagon, glucagon, ipratropium-albuteroL, oxygen  Results for orders placed or performed during the hospital encounter of 07/15/24 (from the past 24 hour(s))   POCT GLUCOSE   Result Value Ref Range    POCT Glucose 313 (H) 74 - 99 mg/dL   POCT GLUCOSE   Result Value Ref Range     POCT Glucose 340 (H) 74 - 99 mg/dL   POCT GLUCOSE   Result Value Ref Range    POCT Glucose 275 (H) 74 - 99 mg/dL   POCT GLUCOSE   Result Value Ref Range    POCT Glucose 201 (H) 74 - 99 mg/dL   Comprehensive Metabolic Panel   Result Value Ref Range    Glucose 203 (H) 74 - 99 mg/dL    Sodium 142 136 - 145 mmol/L    Potassium 4.0 3.5 - 5.3 mmol/L    Chloride 106 98 - 107 mmol/L    Bicarbonate 28 21 - 32 mmol/L    Anion Gap 12 10 - 20 mmol/L    Urea Nitrogen 39 (H) 6 - 23 mg/dL    Creatinine 1.03 0.50 - 1.05 mg/dL    eGFR 62 >60 mL/min/1.73m*2    Calcium 8.3 (L) 8.6 - 10.3 mg/dL    Albumin 2.6 (L) 3.4 - 5.0 g/dL    Alkaline Phosphatase 92 33 - 136 U/L    Total Protein 5.4 (L) 6.4 - 8.2 g/dL    AST 18 9 - 39 U/L    Bilirubin, Total 0.4 0.0 - 1.2 mg/dL    ALT 9 7 - 45 U/L   Magnesium   Result Value Ref Range    Magnesium 1.95 1.60 - 2.40 mg/dL   Phosphorus   Result Value Ref Range    Phosphorus 3.6 2.5 - 4.9 mg/dL   CBC and Auto Differential   Result Value Ref Range    WBC 7.7 4.4 - 11.3 x10*3/uL    nRBC 2.5 (H) 0.0 - 0.0 /100 WBCs    RBC 3.11 (L) 4.00 - 5.20 x10*6/uL    Hemoglobin 8.9 (L) 12.0 - 16.0 g/dL    Hematocrit 29.4 (L) 36.0 - 46.0 %    MCV 95 80 - 100 fL    MCH 28.6 26.0 - 34.0 pg    MCHC 30.3 (L) 32.0 - 36.0 g/dL    RDW 21.7 (H) 11.5 - 14.5 %    Platelets 103 (L) 150 - 450 x10*3/uL    Neutrophils % 68.4 40.0 - 80.0 %    Immature Granulocytes %, Automated 2.1 (H) 0.0 - 0.9 %    Lymphocytes % 23.3 13.0 - 44.0 %    Monocytes % 6.0 2.0 - 10.0 %    Eosinophils % 0.1 0.0 - 6.0 %    Basophils % 0.1 0.0 - 2.0 %    Neutrophils Absolute 5.24 1.20 - 7.70 x10*3/uL    Immature Granulocytes Absolute, Automated 0.16 0.00 - 0.70 x10*3/uL    Lymphocytes Absolute 1.79 1.20 - 4.80 x10*3/uL    Monocytes Absolute 0.46 0.10 - 1.00 x10*3/uL    Eosinophils Absolute 0.01 0.00 - 0.70 x10*3/uL    Basophils Absolute 0.01 0.00 - 0.10 x10*3/uL   Morphology   Result Value Ref Range    RBC Morphology See Below     Polychromasia Mild    ECG 12  Lead   Result Value Ref Range    Ventricular Rate 109 BPM    QRS Duration 98 ms    QT Interval 370 ms    QTC Calculation(Bazett) 498 ms    R Axis -13 degrees    T Axis 242 degrees    QRS Count 17 beats    Q Onset 218 ms    T Offset 403 ms    QTC Fredericia 451 ms   ECG 12 Lead   Result Value Ref Range    Ventricular Rate 120 BPM    Atrial Rate 133 BPM    QRS Duration 92 ms    QT Interval 304 ms    QTC Calculation(Bazett) 429 ms    R Axis -12 degrees    T Axis 209 degrees    QRS Count 20 beats    Q Onset 218 ms    T Offset 370 ms    QTC Fredericia 383 ms   POCT GLUCOSE   Result Value Ref Range    POCT Glucose 107 (H) 74 - 99 mg/dL   POCT GLUCOSE   Result Value Ref Range    POCT Glucose 168 (H) 74 - 99 mg/dL     *Note: Due to a large number of results and/or encounters for the requested time period, some results have not been displayed. A complete set of results can be found in Results Review.     ECG 12 Lead    Result Date: 7/26/2024  Atrial fibrillation with rapid ventricular response Voltage criteria for left ventricular hypertrophy ST & T wave abnormality, consider inferolateral ischemia or digitalis effect Abnormal ECG When compared with ECG of 21-JUL-2024 07:53, (unconfirmed) Atrial fibrillation has replaced Sinus rhythm    ECG 12 Lead    Result Date: 7/26/2024  Atrial fibrillation with rapid ventricular response Moderate voltage criteria for LVH, may be normal variant ( R in aVL , Tyler product ) ST & T wave abnormality, consider inferior ischemia Abnormal ECG When compared with ECG of 26-JUL-2024 05:52, (unconfirmed) T wave inversion no longer evident in Anterior leads     Assessment/Plan   Active Problems:  There are no active Hospital Problems.    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 Patient was found to be in shock requiring pressors due to either sepsis or adrenal insufficiency and was admitted to the ICU. There was concern for GI bleed and multiple specialists were consulted before the patient was hemodynamically stable and deemed appropriate for downgrade to the general medicine service. There was hemodynamic instability with afib RVR and the patient was upgraded back to the ICU before being stabilized and downgraded. Patient is to be [facility] due to concerns regarding underlying rheumatologic etiology causing symptoms.     Shock 2/2 sepsis versus adrenal insufficiency  AMS likely 2/2 above  Hypothermia - resolved  -Pressors weaned off on 7/16 and again on 7/21  -Blood and urine cultures negative  -Cdiff and stool pathogen panel negative  -CT CAP showing diverticulitis at the hepatic flexure without abscess or obvious perforation, there are new likely inflammatory nodules in the right middle and left lower lobe, as well as new small right pleural effusion with small amount of ascites  -Repeat CXR showing mild patchy bilateral ground glass opacities, with possible minimal/small pleural effusions  -Completed course of Zosyn (7/17 - 7/22)  -Continue hydrocortisone 25 mg IV q12hr, will wean back to home steroids per endo  -Endocrinology consulted, there is concern that the patient is pocketing pills as she continues to present with adrenal insufficiency. Endocrine counseled the patient on the significant danger of this activity. Appreciate recs  -PT/OT  -SLP following     4. Epistaxis  5. Hematochezia  6. Normocytic anemia  7. Thrombocytopenia  -S/p 3 units pRBCs due to anemia and 1 unit platelets  -Fecal occult positive  -CT scan showing diverticulitis  -Rhino rockets removed and replaced, Merocel sponge and afrin as needed to  prevent oozing/further bleed  -Protonix 40 mg daily  -Augmentin as prophylaxis given epistaxis and rhino rocket,, Rhino Rocket removed on 7/25/2024  -GI consulted, recommended supportive care, conservative management, possible CTA if active bleeding presents. Patient has poor GFR making CTA difficult to obtain, and is a poor colonoscopy candidate. Ultimately recommended continuing GOC and hospice discussions before signing off. Appreciate recs  -ENT consulted, rhino rockets to control bleeding with antibiotic prophylaxis. Appreciate recs  -Hematology consulted, recommended outpatient biopsy in 4 weeks. Appreciate recs.     8. ATN 2/2 ischemia due to sepsis and GI bleed  9. CKD3b  10 Hypernatremia - resolved  -Creatinine today 1.03, baseline 1.6-1.9  -S/p 1 L of D5W  -Avoid nephrotoxic agents  -Renally dose medications  - continue home 40 mg lasix PO daily  -Montano in place, continue strict I/Os  -Nephrology consulted, no indication for urgent RRT. Appreciate recs     11. SLE  -Case was discussed with the patients rheumatologist Dr. Hoyos, who had not seen the patient in at least a year but agreed with holding Cellcept in the setting of above.  -Resumed Cellcept after completion of abx  -There is concern that rheumatologic etiology is causing presenting symptoms, patient to be transferred to [facility]     12. Afib with RVR  -Not currently on anticoagulation due to bleed risk  -Metoprolol tartrate 12.5 mg BID  -Patient in RVR today with rates 120s-130s s/p 5 mg IV metoprolol with good efficacy  -Optimize electrolytes with goal K >4.0 and Mg > 2.0  -Telemetry     13. Malnutrition  Patient has low albumin and protein and third spaces significant amount of fluid, will diurese as tolerated and highly encourage PO intake with protein supplementation.  -Corpak unable to be placed due to above, patient is a poor candidate for PEG tube  -PPN dc/ed on 7/25     14. COPD  -Scheduled and prn duonebs  -Stable on RA     15.  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  Internal Medicine PGY-2

## 2024-07-26 NOTE — NURSING NOTE
The patient is waiting for a bed at  [facility]. The TPN has been discontinued. The patient has been able to take her medications crushed in applesauce. She had some difficulty swallowing the capsules whole in applesauce, but no coughing or choking. She has been in NSR, A paced, V paced and AF. The patient has a history of Paroxysmal AF.  Teaching Resident informed. 12 lead EKG obtained confirming AF RVR rate currently 120. The patient is asymptomatic. She has had reported diarrhea stools during the day shift yesterday and several episodes of diarrhea on night shift. Morning lab results are not available yet.

## 2024-07-26 NOTE — PROGRESS NOTES
"    Endocrinology Inpatient Consult Progress Note     PATIENT NAME: Bing Holliday  MRN: [id number]  DATE: 7/26/2024    CONSULTING PHYSICIAN: Dr. Lambert  REASON FOR CONSULT: AI.      Interval Events     No complaints.  The patient was sleeping.  I woke her up and tolerated breakfast tray was available.  She states she is hungry.  She denies any nausea or vomiting.  She is awaiting transfer to [facility].      Physical Examination     /90 (BP Location: Left arm, Patient Position: Lying)   Pulse 100   Temp 35.6 °C (96.1 °F) (Temporal)   Resp 25   Ht 1.651 m (5' 5\")   Wt 95.7 kg (210 lb 15.7 oz)   LMP  (LMP Unknown)   SpO2 94%   BMI 35.11 kg/m²   NAD  Temporal wasting.  RRR  Non labored resp  Nil pedal edema      Medications     Reviewed MAR       Data     Recent Labs and Imaging Reviewed      Assessment / Plan        # Adrenal Insufficiency  Per my previous notes.  Patient is clinically improved.  Unfortunately I am not confident that she is able to take pills.  Given that missing her glucocorticoids could lead to fatality and other comorbidities, we will continue her on intravenous hydrocortisone.  I will decrease this to hydrocortisone 25 mg every 12 hours.    # Hypoglycemia  # Uncontrolled Type 2 Diabetes Mellitus  There has been no hypoglycemia in the last 72 hours.  Continue her modified sliding scale.  I am okay with her being hyperglycemic given her many comorbidities.  She stands a lot more to lose with hypoglycemia.     # Pancytopenia  Maybe related to MMF which has been held.     # Abnormal TFTs  I have reviewed the patient's TSH values for the last 5 years.  She has been more time in the hospital than outside of the hospital during this time.  She has multiple TSH values which I suspect have been ordered due to encephalopathy.  She does not have any TSH values greater than 10.  On admission it was in the 4 range.  I do think this is secondary to nonthyroidal illness.  Her free T4 is normal which is " reassuring.  I recommend no intervention on her TSH value.     # Osteoporosis  In the setting of chronic glucocorticoid use. This will be further managed as an outpatient. She probably would benefit from a bisphosphonate, these agents are best studied with steroid-induced adynamic bone disease.         Avi Sloan, DO  Endocrinology, Diabetes, and Metabolism    Available via EPIC Messenger    Please excuse any typographical or unwanted errors within this documentation as voice recognition software was used to dictate this note.

## 2024-07-27 LAB
ALBUMIN SERPL BCP-MCNC: 2.7 G/DL (ref 3.4–5)
ALBUMIN SERPL BCP-MCNC: 2.9 G/DL (ref 3.4–5)
ALP SERPL-CCNC: 114 U/L (ref 33–136)
ALT SERPL W P-5'-P-CCNC: 13 U/L (ref 7–45)
ANION GAP SERPL CALC-SCNC: 14 MMOL/L (ref 10–20)
ANION GAP SERPL CALC-SCNC: 15 MMOL/L (ref 10–20)
AST SERPL W P-5'-P-CCNC: 18 U/L (ref 9–39)
ATRIAL RATE: 133 BPM
BASOPHILS # BLD AUTO: 0.02 X10*3/UL (ref 0–0.1)
BASOPHILS NFR BLD AUTO: 0.2 %
BILIRUB SERPL-MCNC: 0.4 MG/DL (ref 0–1.2)
BUN SERPL-MCNC: 36 MG/DL (ref 6–23)
BUN SERPL-MCNC: 38 MG/DL (ref 6–23)
BURR CELLS BLD QL SMEAR: NORMAL
CALCIUM SERPL-MCNC: 8.3 MG/DL (ref 8.6–10.3)
CALCIUM SERPL-MCNC: 8.5 MG/DL (ref 8.6–10.3)
CHLORIDE SERPL-SCNC: 105 MMOL/L (ref 98–107)
CHLORIDE SERPL-SCNC: 108 MMOL/L (ref 98–107)
CO2 SERPL-SCNC: 27 MMOL/L (ref 21–32)
CO2 SERPL-SCNC: 30 MMOL/L (ref 21–32)
CREAT SERPL-MCNC: 1.1 MG/DL (ref 0.5–1.05)
CREAT SERPL-MCNC: 1.12 MG/DL (ref 0.5–1.05)
DACRYOCYTES BLD QL SMEAR: NORMAL
EGFRCR SERPLBLD CKD-EPI 2021: 56 ML/MIN/1.73M*2
EGFRCR SERPLBLD CKD-EPI 2021: 57 ML/MIN/1.73M*2
EOSINOPHIL # BLD AUTO: 0.01 X10*3/UL (ref 0–0.7)
EOSINOPHIL NFR BLD AUTO: 0.1 %
ERYTHROCYTE [DISTWIDTH] IN BLOOD BY AUTOMATED COUNT: 23.1 % (ref 11.5–14.5)
GLUCOSE BLD MANUAL STRIP-MCNC: 125 MG/DL (ref 74–99)
GLUCOSE BLD MANUAL STRIP-MCNC: 145 MG/DL (ref 74–99)
GLUCOSE BLD MANUAL STRIP-MCNC: 148 MG/DL (ref 74–99)
GLUCOSE BLD MANUAL STRIP-MCNC: 151 MG/DL (ref 74–99)
GLUCOSE BLD MANUAL STRIP-MCNC: 84 MG/DL (ref 74–99)
GLUCOSE BLD MANUAL STRIP-MCNC: 89 MG/DL (ref 74–99)
GLUCOSE SERPL-MCNC: 137 MG/DL (ref 74–99)
GLUCOSE SERPL-MCNC: 186 MG/DL (ref 74–99)
HCT VFR BLD AUTO: 32.9 % (ref 36–46)
HGB BLD-MCNC: 9.7 G/DL (ref 12–16)
IMM GRANULOCYTES # BLD AUTO: 0.08 X10*3/UL (ref 0–0.7)
IMM GRANULOCYTES NFR BLD AUTO: 0.9 % (ref 0–0.9)
LYMPHOCYTES # BLD AUTO: 2.87 X10*3/UL (ref 1.2–4.8)
LYMPHOCYTES NFR BLD AUTO: 33.3 %
MAGNESIUM SERPL-MCNC: 1.84 MG/DL (ref 1.6–2.4)
MAGNESIUM SERPL-MCNC: 1.93 MG/DL (ref 1.6–2.4)
MCH RBC QN AUTO: 28.7 PG (ref 26–34)
MCHC RBC AUTO-ENTMCNC: 29.5 G/DL (ref 32–36)
MCV RBC AUTO: 97 FL (ref 80–100)
MONOCYTES # BLD AUTO: 0.64 X10*3/UL (ref 0.1–1)
MONOCYTES NFR BLD AUTO: 7.4 %
NEUTROPHILS # BLD AUTO: 5 X10*3/UL (ref 1.2–7.7)
NEUTROPHILS NFR BLD AUTO: 58.1 %
NRBC BLD-RTO: 2.1 /100 WBCS (ref 0–0)
OVALOCYTES BLD QL SMEAR: NORMAL
PHOSPHATE SERPL-MCNC: 3.6 MG/DL (ref 2.5–4.9)
PHOSPHATE SERPL-MCNC: 3.7 MG/DL (ref 2.5–4.9)
PLATELET # BLD AUTO: 116 X10*3/UL (ref 150–450)
POLYCHROMASIA BLD QL SMEAR: NORMAL
POTASSIUM SERPL-SCNC: 3.5 MMOL/L (ref 3.5–5.3)
POTASSIUM SERPL-SCNC: 3.7 MMOL/L (ref 3.5–5.3)
PROT SERPL-MCNC: 5.5 G/DL (ref 6.4–8.2)
Q ONSET: 218 MS
Q ONSET: 218 MS
QRS COUNT: 17 BEATS
QRS COUNT: 20 BEATS
QRS DURATION: 92 MS
QRS DURATION: 98 MS
QT INTERVAL: 304 MS
QT INTERVAL: 370 MS
QTC CALCULATION(BAZETT): 429 MS
QTC CALCULATION(BAZETT): 498 MS
QTC FREDERICIA: 383 MS
QTC FREDERICIA: 451 MS
R AXIS: -12 DEGREES
R AXIS: -13 DEGREES
RBC # BLD AUTO: 3.38 X10*6/UL (ref 4–5.2)
RBC MORPH BLD: NORMAL
SODIUM SERPL-SCNC: 145 MMOL/L (ref 136–145)
SODIUM SERPL-SCNC: 146 MMOL/L (ref 136–145)
T AXIS: 209 DEGREES
T AXIS: 242 DEGREES
T OFFSET: 370 MS
T OFFSET: 403 MS
VENTRICULAR RATE: 109 BPM
VENTRICULAR RATE: 120 BPM
WBC # BLD AUTO: 8.6 X10*3/UL (ref 4.4–11.3)

## 2024-07-27 PROCEDURE — 80069 RENAL FUNCTION PANEL: CPT

## 2024-07-27 PROCEDURE — 36415 COLL VENOUS BLD VENIPUNCTURE: CPT

## 2024-07-27 PROCEDURE — 2500000001 HC RX 250 WO HCPCS SELF ADMINISTERED DRUGS (ALT 637 FOR MEDICARE OP)

## 2024-07-27 PROCEDURE — 2500000004 HC RX 250 GENERAL PHARMACY W/ HCPCS (ALT 636 FOR OP/ED)

## 2024-07-27 PROCEDURE — 99233 SBSQ HOSP IP/OBS HIGH 50: CPT | Performed by: INTERNAL MEDICINE

## 2024-07-27 PROCEDURE — 80051 ELECTROLYTE PANEL: CPT

## 2024-07-27 PROCEDURE — 85025 COMPLETE CBC W/AUTO DIFF WBC: CPT

## 2024-07-27 PROCEDURE — 2060000001 HC INTERMEDIATE ICU ROOM DAILY

## 2024-07-27 PROCEDURE — 84100 ASSAY OF PHOSPHORUS: CPT

## 2024-07-27 PROCEDURE — 2500000002 HC RX 250 W HCPCS SELF ADMINISTERED DRUGS (ALT 637 FOR MEDICARE OP, ALT 636 FOR OP/ED)

## 2024-07-27 PROCEDURE — 83735 ASSAY OF MAGNESIUM: CPT

## 2024-07-27 PROCEDURE — 94640 AIRWAY INHALATION TREATMENT: CPT

## 2024-07-27 PROCEDURE — 82947 ASSAY GLUCOSE BLOOD QUANT: CPT

## 2024-07-27 PROCEDURE — 2500000004 HC RX 250 GENERAL PHARMACY W/ HCPCS (ALT 636 FOR OP/ED): Performed by: INTERNAL MEDICINE

## 2024-07-27 RX ORDER — POTASSIUM CHLORIDE 20 MEQ/1
40 TABLET, EXTENDED RELEASE ORAL ONCE
Status: COMPLETED | OUTPATIENT
Start: 2024-07-27 | End: 2024-07-27

## 2024-07-27 RX ORDER — LANOLIN ALCOHOL/MO/W.PET/CERES
800 CREAM (GRAM) TOPICAL ONCE
Status: COMPLETED | OUTPATIENT
Start: 2024-07-27 | End: 2024-07-27

## 2024-07-27 RX ORDER — METOPROLOL TARTRATE 25 MG/1
25 TABLET, FILM COATED ORAL 2 TIMES DAILY
Status: DISCONTINUED | OUTPATIENT
Start: 2024-07-27 | End: 2024-07-30 | Stop reason: HOSPADM

## 2024-07-27 ASSESSMENT — PAIN - FUNCTIONAL ASSESSMENT
PAIN_FUNCTIONAL_ASSESSMENT: 0-10

## 2024-07-27 ASSESSMENT — PAIN SCALES - GENERAL
PAINLEVEL_OUTOF10: 0 - NO PAIN

## 2024-07-27 NOTE — CARE PLAN
Patient continues pending [place] [facility] at this time.     The patient's goals for the shift include  n/a.    The clinical goals for the shift include Patient will have controlled heartrate <120 throughout shift by end of shift 07/27/2024 @ 1700.    Over the shift, the patient did not make progress toward the following goals. Barriers to progression include comorbidities. Recommendations to address these barriers include medical management.

## 2024-07-27 NOTE — NURSING NOTE
Patient is in AF RVR. She is asymptomatic. Teaching Resident is aware and has ordered Metoprolol 5 mgt IVP and hold scheduled oral dose for now.    2000 The patient was medicated with Metoprolol 5 mg IVP. The dose was ineffective. The patient continues in AF RVR rate 100-130. She is asymptomatic.     2100 Teaching Resident on floor at bedside. He is aware of HR. Patient medicated with scheduled oral dose of Metoprolol 12.5 mg po. Continue to monitor.. RT held scheduled aerosol for now due to tachycardia.

## 2024-07-27 NOTE — CARE PLAN
The patient's goals for the shift include      The clinical goals for the shift include The patient will remain hemodynamically stable. Heart rate will be controlled less than 100 by 7/27 0700    Over the shift, the patient did not make progress toward the following goals. Barriers to progression include ***. Recommendations to address these barriers include ***.

## 2024-07-27 NOTE — PROGRESS NOTES
Bing Holliday is a 63 y.o. female on day 12 of admission presenting with Hypothermia, initial encounter.    Subjective   -Patient seen this morning resting comfortably in bed  -VSS WNL with exception to HR 90s-110s  -Overnight patient went into atrial fibrillation with rates 90s-130s  -Patient's labs morning significant for: Hemoglobin 9.7, platelets 116 otherwise unremarkable  -Patient denied all symptomatology pertaining to 12 point ROS.    Objective     Last Recorded Vitals  /76 (BP Location: Left arm)   Pulse 98   Temp 35.6 °C (96.1 °F) (Temporal)   Resp 24   Wt 94.3 kg (207 lb 14.3 oz)   SpO2 100%   Intake/Output last 3 Shifts:    Intake/Output Summary (Last 24 hours) at 7/27/2024 1153  Last data filed at 7/26/2024 1853  Gross per 24 hour   Intake --   Output 500 ml   Net -500 ml     Admission Weight  Weight: 99.8 kg (220 lb 0.3 oz) (07/15/24 1040)    Daily Weight  07/27/24 : 94.3 kg (207 lb 14.3 oz)    Image Results  ECG 12 Lead  Atrial fibrillation with rapid ventricular response  Moderate voltage criteria for LVH, may be normal variant ( R in aVL , [place] product )  ST & T wave abnormality, consider inferior ischemia  Abnormal ECG  When compared with ECG of 26-JUL-2024 05:52, (unconfirmed)  T wave inversion no longer evident in Anterior leads  Confirmed by Evaristo Moran ([id number]) on 7/27/2024 8:05:23 AM  ECG 12 Lead  Atrial fibrillation with rapid ventricular response  Voltage criteria for left ventricular hypertrophy  ST & T wave abnormality, consider inferolateral ischemia or digitalis effect  Abnormal ECG  When compared with ECG of 21-JUL-2024 07:53, (unconfirmed)  Atrial fibrillation has replaced Sinus rhythm  Confirmed by Evaristo Moran ([id number]) on 7/27/2024 8:05:18 AM    Physical Exam:  General: Not in acute distress, A&O x 3, alert, cooperative, ill-appearing, obese  HEENT: Normocephalic, atraumatic, EOMI, moist mucous membranes  Neck: Neck supple, trachea midline, no evidence of  trauma  Cardiovascular: IRR, S1 and S2 appreciated, no murmurs rubs gallops appreciated, distal pulses 2+ bilaterally (radial and dorsalis pedis)  Respiratory: Vesicular breath sounds appreciated bilaterally, no adventitious sounds appreciated, no increased work of breathing  GI: Abdomen soft, nondistended, nontender to palpation, bowel sounds present  Extremities: No edema appreciated in lower extremities bilaterally, no cyanosis, generalized anasarca present, generalized weakness present  Neuro: A&O X3, no focal deficits, strength and sensation intact bilaterally  Skin: Warm and dry, without lesions or rashes, covered skin tear on left cheek      Relevant Results  Scheduled medications  atorvastatin, 40 mg, oral, Nightly  budesonide, 0.5 mg, nebulization, BID  [Held by provider] docusate sodium, 100 mg, oral, BID  folic acid, 1 mg, oral, Daily  [Held by provider] formoterol, 20 mcg, nebulization, BID  furosemide, 40 mg, oral, Daily  [Held by provider] heparin (porcine), 5,000 Units, subcutaneous, q8h  hydrocortisone sodium succinate, 25 mg, intravenous, q12h  insulin lispro, 0-5 Units, subcutaneous, Before meals & nightly  ipratropium-albuteroL, 3 mL, nebulization, TID  lactated Ringer's, 500 mL, intravenous, Once  levETIRAcetam, 500 mg, oral, BID  [Held by provider] melatonin, 5 mg, oral, Nightly  metoprolol tartrate, 25 mg, oral, BID  mycophenolate, 1,000 mg, oral, BID  nystatin, 1 Application, Topical, BID  pantoprazole, 40 mg, oral, Daily before breakfast  [Held by provider] polyethylene glycol, 17 g, oral, Daily  risperiDONE, 3 mg, oral, BID  sertraline, 25 mg, oral, Nightly  [Held by provider] sodium chloride, 2 spray, Each Nostril, TID  thiamine, 100 mg, oral, Daily      Continuous medications       PRN medications  PRN medications: acetaminophen, dextrose, dextrose, glucagon, glucagon, ipratropium-albuteroL, oxygen  Results for orders placed or performed during the hospital encounter of 07/15/24 (from the  past 24 hour(s))   POCT GLUCOSE   Result Value Ref Range    POCT Glucose 133 (H) 74 - 99 mg/dL   POCT GLUCOSE   Result Value Ref Range    POCT Glucose 213 (H) 74 - 99 mg/dL   Renal function panel   Result Value Ref Range    Glucose 186 (H) 74 - 99 mg/dL    Sodium 145 136 - 145 mmol/L    Potassium 3.5 3.5 - 5.3 mmol/L    Chloride 105 98 - 107 mmol/L    Bicarbonate 30 21 - 32 mmol/L    Anion Gap 14 10 - 20 mmol/L    Urea Nitrogen 38 (H) 6 - 23 mg/dL    Creatinine 1.12 (H) 0.50 - 1.05 mg/dL    eGFR 56 (L) >60 mL/min/1.73m*2    Calcium 8.5 (L) 8.6 - 10.3 mg/dL    Phosphorus 3.7 2.5 - 4.9 mg/dL    Albumin 2.9 (L) 3.4 - 5.0 g/dL   Magnesium   Result Value Ref Range    Magnesium 1.84 1.60 - 2.40 mg/dL   POCT GLUCOSE   Result Value Ref Range    POCT Glucose 148 (H) 74 - 99 mg/dL   Comprehensive Metabolic Panel   Result Value Ref Range    Glucose 137 (H) 74 - 99 mg/dL    Sodium 146 (H) 136 - 145 mmol/L    Potassium 3.7 3.5 - 5.3 mmol/L    Chloride 108 (H) 98 - 107 mmol/L    Bicarbonate 27 21 - 32 mmol/L    Anion Gap 15 10 - 20 mmol/L    Urea Nitrogen 36 (H) 6 - 23 mg/dL    Creatinine 1.10 (H) 0.50 - 1.05 mg/dL    eGFR 57 (L) >60 mL/min/1.73m*2    Calcium 8.3 (L) 8.6 - 10.3 mg/dL    Albumin 2.7 (L) 3.4 - 5.0 g/dL    Alkaline Phosphatase 114 33 - 136 U/L    Total Protein 5.5 (L) 6.4 - 8.2 g/dL    AST 18 9 - 39 U/L    Bilirubin, Total 0.4 0.0 - 1.2 mg/dL    ALT 13 7 - 45 U/L   Magnesium   Result Value Ref Range    Magnesium 1.93 1.60 - 2.40 mg/dL   CBC and Auto Differential   Result Value Ref Range    WBC 8.6 4.4 - 11.3 x10*3/uL    nRBC 2.1 (H) 0.0 - 0.0 /100 WBCs    RBC 3.38 (L) 4.00 - 5.20 x10*6/uL    Hemoglobin 9.7 (L) 12.0 - 16.0 g/dL    Hematocrit 32.9 (L) 36.0 - 46.0 %    MCV 97 80 - 100 fL    MCH 28.7 26.0 - 34.0 pg    MCHC 29.5 (L) 32.0 - 36.0 g/dL    RDW 23.1 (H) 11.5 - 14.5 %    Platelets 116 (L) 150 - 450 x10*3/uL    Neutrophils % 58.1 40.0 - 80.0 %    Immature Granulocytes %, Automated 0.9 0.0 - 0.9 %    Lymphocytes  % 33.3 13.0 - 44.0 %    Monocytes % 7.4 2.0 - 10.0 %    Eosinophils % 0.1 0.0 - 6.0 %    Basophils % 0.2 0.0 - 2.0 %    Neutrophils Absolute 5.00 1.20 - 7.70 x10*3/uL    Immature Granulocytes Absolute, Automated 0.08 0.00 - 0.70 x10*3/uL    Lymphocytes Absolute 2.87 1.20 - 4.80 x10*3/uL    Monocytes Absolute 0.64 0.10 - 1.00 x10*3/uL    Eosinophils Absolute 0.01 0.00 - 0.70 x10*3/uL    Basophils Absolute 0.02 0.00 - 0.10 x10*3/uL   Phosphorus   Result Value Ref Range    Phosphorus 3.6 2.5 - 4.9 mg/dL   Morphology   Result Value Ref Range    RBC Morphology See Below     Polychromasia Mild     Ovalocytes Few     Teardrop Cells Few     [place] Cells Few    POCT GLUCOSE   Result Value Ref Range    POCT Glucose 145 (H) 74 - 99 mg/dL   POCT GLUCOSE   Result Value Ref Range    POCT Glucose 151 (H) 74 - 99 mg/dL     *Note: Due to a large number of results and/or encounters for the requested time period, some results have not been displayed. A complete set of results can be found in Results Review.     ECG 12 Lead    Result Date: 7/27/2024  Atrial fibrillation with rapid ventricular response Moderate voltage criteria for LVH, may be normal variant ( R in aVL , [place] product ) ST & T wave abnormality, consider inferior ischemia Abnormal ECG When compared with ECG of 26-JUL-2024 05:52, (unconfirmed) T wave inversion no longer evident in Anterior leads Confirmed by Evaristo Moran ([id number]) on 7/27/2024 8:05:23 AM    ECG 12 Lead    Result Date: 7/27/2024  Atrial fibrillation with rapid ventricular response Voltage criteria for left ventricular hypertrophy ST & T wave abnormality, consider inferolateral ischemia or digitalis effect Abnormal ECG When compared with ECG of 21-JUL-2024 07:53, (unconfirmed) Atrial fibrillation has replaced Sinus rhythm Confirmed by Evaristo Moran ([id number]) on 7/27/2024 8:05:18 AM     Assessment/Plan   Active Problems:  There are no active Hospital Problems.    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 Patient was found to be in shock requiring pressors due to either sepsis or adrenal insufficiency and was admitted to the ICU. There was concern for GI bleed and multiple specialists were consulted before the patient was hemodynamically stable and deemed appropriate for downgrade to the general medicine service. There was hemodynamic instability with afib RVR and the patient was upgraded back to the ICU before being stabilized and downgraded. Patient is to be transferred to [facility] due to concerns regarding underlying rheumatologic etiology causing symptoms.     Shock 2/2 sepsis versus adrenal insufficiency  AMS likely 2/2 above  Hypothermia - resolved  -Pressors weaned off on 7/16 and again on 7/21  -Blood and urine cultures negative  -Cdiff and stool pathogen panel negative  -CT CAP showing diverticulitis at the hepatic flexure without abscess or obvious perforation, there are new likely inflammatory nodules in the right middle and left lower lobe, as well as new small right pleural effusion with small amount of ascites  -Repeat CXR showing mild patchy bilateral ground glass opacities, with possible minimal/small pleural effusions  -Completed course of Zosyn (7/17 - 7/22)  -Continue hydrocortisone 25 mg IV q12hr, will wean back to home steroids per endo  -Endocrinology consulted, there is concern that the patient is pocketing pills as she continues to present with adrenal insufficiency. Endocrine counseled the patient on the significant danger of this activity. Appreciate recs  -PT/OT  -SLP following     4. Epistaxis  5. Hematochezia  6. Normocytic anemia  7.  Thrombocytopenia  -S/p 3 units pRBCs due to anemia and 1 unit platelets  -Fecal occult positive  -CT scan showing diverticulitis  -Rhino rockets removed and replaced, Merocel sponge and afrin as needed to prevent oozing/further bleed  -Protonix 40 mg daily  -Augmentin as prophylaxis given epistaxis and rhino rocket,, Rhino Rocket removed on 7/25/2024  -GI consulted, recommended supportive care, conservative management, possible CTA if active bleeding presents. Patient has poor GFR making CTA difficult to obtain, and is a poor colonoscopy candidate. Ultimately recommended continuing GOC and hospice discussions before signing off. Appreciate recs  -ENT consulted, rhino rockets to control bleeding with antibiotic prophylaxis. Appreciate recs  -Hematology consulted, recommended outpatient biopsy in 4 weeks. Appreciate recs.     8. ATN 2/2 ischemia due to sepsis and GI bleed  9. CKD3b  10 Hypernatremia - resolved  -Creatinine today 1.03, baseline 1.6-1.9  -S/p 1 L of D5W  -Avoid nephrotoxic agents  -Renally dose medications  - continue home 40 mg lasix PO daily  -Montano in place, continue strict I/Os  -Nephrology consulted, no indication for urgent RRT. Appreciate recs     11. SLE  -Case was discussed with the patients rheumatologist Dr. Hoyos, who had not seen the patient in at least a year but agreed with holding Cellcept in the setting of above.  -Resumed Cellcept after completion of abx  -There is concern that rheumatologic etiology is causing presenting symptoms, patient to be transferred to [facility]     12. Afib with RVR  -Not currently on anticoagulation due to bleed risk  -Metoprolol tartrate 25 mg BID  -Patient in RVR today with rates 90s-130s   -Optimize electrolytes with goal K >4.0 and Mg > 2.0  -Telemetry     13. Malnutrition  Patient has low albumin and protein and third spaces significant amount of fluid, will diurese as tolerated and highly encourage PO intake with protein supplementation.  -Corpak unable to  be placed due to above, patient is a poor candidate for PEG tube  -PPN dc/ed on 7/25     14. COPD  -Scheduled and prn duonebs  -Stable on RA     15. IDDM2  -Holding home diabetic medications  -SSI     # HTN  # HLD - atorvastatin  # Seizures - keppra  # Psychosis - risperidone  # Osteoporosis 2/2 chronic glucocorticoid - will need outpatient bisphophonates  -Continue home medications and management as appropriate     IVF: None at this time  Diet: Regular pureed with protein supplements  DVT prophylaxis: Held in the setting of bleed  Dispo: Anticipate 3-5 additional days hospital stay pending transfer to [facility] for rheumatologic evaluation  Consults: GI, Nephro, ENT, Endo, Hematology, Palliative, SLP    CODE STATUS: DNR, DNI okay with ICU     The assessment and plan were discussed with the attending physician,    Andrew Aranda M.D.  Internal Medicine PGY-2

## 2024-07-27 NOTE — PROGRESS NOTES
INPATIENT NEPHROLOGY CONSULT PROGRESS NOTE      Patient Name: Bing Holliday MRN: [id number]  DATE of SERVICE: July 27, 2024  TIME of SERVICE: 02:28 PM  CONSULTING SERVICE: Nephrology    REASON for CONSULT: JED    SUBJECTIVE:  Seen and evaluated at bedside, more awake.  Oral intake remains marginal  Sodium level up to 146 mmol/L today to encourage free water intake.  To hold further furosemide    SUMMARY OF STAY:  Ms. Holliday is a 63 y.o. female who presented to [facility] July 15, 2024 for evaluation of weakness noted at skilled nursing facility.  Diagnosed with septic shock, acute diverticulitis, acute diverticular bleed and admitted to the intensive care unit.  Patient with complex past medical history including lupus nephritis class V in 2011, systemic lupus erythematosus with chronic kidney disease stage IIIb,  Cerebritis and arthropathy, uncontrolled type 2 diabetes mellitus, paroxysmal atrial fibrillation, heart failure with reduced ejection fraction, coronary artery disease status post CABG 2013, COPD, pulmonary hypertension, hypertension, hyperlipidemia, GERD.    ASSESSMENT:  Acute kidney injury superimposed on chronic kidney disease stage IIIb:  -- Acute kidney injury likely ischemic related ATN in the setting of septic shock and acute GI bleed, peak serum creatinine up to 2.6 mg deciliter with drop in GFR to 20 mill per minute per 1.73 m²  --From baseline serum creatinine of 1.5 and EGFR of 40 mill per minute per 1.73 m²  --Immunocompromised host with pancytopenia  --Acute diverticulitis with diverticular bleed, s/p blood transfusion.   --Montano catheter in place no signs of obstruction  --On background CKD 3B, lupus nephritis class V  --Code status DNR/DNI no dialysis   --Patient reports that her mother passed During hemodialysis session and she does not wish to be on hemodialysis.  --Renal parameters improving serum creatinine down to 1.2  mg deciliter     Septic shock secondary to acute diverticulitis: Resolved  -- Was hypothermic on presentation  -- Shock resolved, off pressor support     Acute hypoxic respiratory failure requiring intermittent Venturi mask     Volume: Tendency to third space due to hypoalbuminemia, CKD, diabetes patient with underlying abdominal situs.  -- Chest x-ray with findings concerning for pulmonary edema  -- Intermittent volume expansion with oncotic solution to maintain hemodynamic stability.     Acute blood loss anemia, GI team following patient at high risk to have procedure.     Immunocompromised     Systemic lupus erythematosus managed with mycophenolate and prednisone.     Adrenal insufficiency: On hydrocortisone 50 mg 3 times daily.     Pancytopenia     Pulmonary hypertension     Paroxysmal atrial fibrillation, rate controlled       PLAN:  Hypernatremia: to discontinue furosemide since patient off TPN and oral intake remains marginal.  To encourage free water intake  Acute kidney injury improved and stable.  Renal function panel improved serum creatinine down to 1.1 mg/dL  Metabolic acidosis corrected of sodium bicarbonates  To cont protein supplements 3 times daily.  Strict input and output to guide volume management.     Pending transfer to [facility]    Will follow, thank you!    Medications:    Current Facility-Administered Medications:     acetaminophen (Tylenol) tablet 650 mg, 650 mg, oral, q4h PRN, Andrew Aranda MD    atorvastatin (Lipitor) tablet 40 mg, 40 mg, oral, Nightly, Andrew Aranda MD, 40 mg at 07/26/24 2016    budesonide (Pulmicort) 0.5 mg/2 mL nebulizer solution 0.5 mg, 0.5 mg, nebulization, BID, Andrew Aranda MD, 0.5 mg at 07/26/24 0959    dextrose 50 % injection 12.5 g, 12.5 g, intravenous, q15 min PRN, Andrew Aranda MD    dextrose 50 % injection 25 g, 25 g, intravenous, q15 min PRN, Andrew Aranda MD    [Held by provider] docusate sodium (Colace) capsule 100 mg, 100 mg, oral, BID, Cheko Angel  DO    folic acid (Folvite) tablet 1 mg, 1 mg, oral, Daily, Andrew Aranda MD, 1 mg at 07/27/24 0852    [Held by provider] formoterol (Perforomist) 20 mcg/2 mL nebulizer solution 20 mcg, 20 mcg, nebulization, BID, Donald Busby MD, 20 mcg at 07/22/24 0943    furosemide (Lasix) tablet 40 mg, 40 mg, oral, Daily, Andrew Aranda MD, 40 mg at 07/27/24 0852    glucagon (Glucagen) injection 1 mg, 1 mg, intramuscular, q15 min PRN, Andrew Aranda MD    glucagon (Glucagen) injection 1 mg, 1 mg, intramuscular, q15 min PRN, Andrew Aranda MD    [Held by provider] heparin (porcine) injection 5,000 Units, 5,000 Units, subcutaneous, q8h, Cheko Angel DO, 5,000 Units at 07/16/24 2349    hydrocortisone sod succ (PF) (Solu-CORTEF) injection 25 mg, 25 mg, intravenous, q12h, Avi Sloan, , 25 mg at 07/27/24 1447    insulin lispro (HumaLOG) injection 0-5 Units, 0-5 Units, subcutaneous, Before meals & nightly, Andrew Aranda MD, 2 Units at 07/27/24 1206    ipratropium-albuteroL (Duo-Neb) 0.5-2.5 mg/3 mL nebulizer solution 3 mL, 3 mL, nebulization, q4h PRN, Andrew Aranda MD    ipratropium-albuteroL (Duo-Neb) 0.5-2.5 mg/3 mL nebulizer solution 3 mL, 3 mL, nebulization, TID, Andrew Aranda MD, 3 mL at 07/26/24 1547    lactated Ringer's bolus 500 mL, 500 mL, intravenous, Once, Cody Seth MD    levETIRAcetam (Keppra) tablet 500 mg, 500 mg, oral, BID, Cheko Angel DO, 500 mg at 07/27/24 0852    [Held by provider] melatonin tablet 5 mg, 5 mg, oral, Nightly, Cheko Angel DO    metoprolol tartrate (Lopressor) tablet 25 mg, 25 mg, oral, BID, Andrew Aranda MD, 25 mg at 07/27/24 0852    mycophenolate (Cellcept) capsule 1,000 mg, 1,000 mg, oral, BID, Andrew Aranda MD, 1,000 mg at 07/27/24 0853    nystatin (Mycostatin) 100,000 unit/gram powder 1 Application, 1 Application, Topical, BID, Andrew Aranda MD, 1 Application at 07/27/24 0853    oxygen (O2) therapy, , inhalation, Continuous PRN - O2/gases, Andrew Aranda MD, 30 percent  at 07/20/24 0850    pantoprazole (ProtoNix) EC tablet 40 mg, 40 mg, oral, Daily before breakfast, Cheko Angel DO, 40 mg at 07/27/24 0741    [Held by provider] polyethylene glycol (Glycolax, Miralax) packet 17 g, 17 g, oral, Daily, Andrew Aranda MD, 17 g at 07/21/24 0848    risperiDONE (RisperDAL) tablet 3 mg, 3 mg, oral, BID, Andrew Aranda MD, 3 mg at 07/27/24 0852    sertraline (Zoloft) tablet 25 mg, 25 mg, oral, Nightly, Andrew Aranda MD, 25 mg at 07/26/24 2016    [Held by provider] sodium chloride ([place]cean) 0.65 % nasal spray 2 spray, 2 spray, Each Nostril, TID, Cheko Angel DO, 2 spray at 07/25/24 1239    thiamine (Vitamin B-1) tablet 100 mg, 100 mg, oral, Daily, Andrew Aranda MD, 100 mg at 07/27/24 0853    PERTINENT ROS:  GENERAL:  positive for fatigue, poor appetite.  No fever/chills  RESPIRATORY:  positive for shortness of breath.  Negative for cough, wheezing.  CARDIOVASCULAR:   Negative for chest pain or palpitation.  GI:  Negative for abdominal pain, diarrhea, heartburn, nausea, vomiting      Physical Exam:  Vital signs in last 24 hours:  Temp:  [35.6 °C (96.1 °F)-35.8 °C (96.4 °F)] 35.8 °C (96.4 °F)  Heart Rate:  [] 96  Resp:  [20-32] 20  BP: (101-133)/(65-96) 129/87    General: Awake, not in acute distress  HEENT: Nasal packing removed  NECK:  no  JVD, no carotid bruit, supple, no cervical mass or thyromegaly  LUNGS;  Diminished breath sounds, no Rales  CV:  Distant, regular rate and rhythm, no murmurs  ABDOMEN:  abdomen soft, nontender, BS normal, no masses or organomegaly  EDEMA:  no lower extremity edema/dependent edema  SKIN:  dry and normal turgor, no clubbing, cyanosis or petechia.  No rashes noted      Intake/Output last 3 shifts:  I/O last 3 completed shifts:  In: 50 (0.5 mL/kg) [P.O.:50]  Out: 2425 (25.7 mL/kg) [Urine:2425 (0.7 mL/kg/hr)]  Weight: 94.3 kg     DATA:  Diagnotic tests reviewed for Todays visit:  Results from last 7 days   Lab Units 07/27/24  0357   WBC AUTO x10*3/uL 8.6    RBC AUTO x10*6/uL 3.38*   HEMOGLOBIN g/dL 9.7*   HEMATOCRIT % 32.9*     Results from last 7 days   Lab Units 07/27/24  0357   SODIUM mmol/L 146*   POTASSIUM mmol/L 3.7   CHLORIDE mmol/L 108*   CO2 mmol/L 27   BUN mg/dL 36*   CREATININE mg/dL 1.10*   CALCIUM mg/dL 8.3*   PHOSPHORUS mg/dL 3.6   MAGNESIUM mg/dL 1.93   BILIRUBIN TOTAL mg/dL 0.4   ALT U/L 13   AST U/L 18     Results from last 7 days   Lab Units 07/21/24  1621   COLOR U  Yellow   APPEARANCE U  Turbid*   PH U  5.5   SPEC GRAV UR  1.021   PROTEIN U mg/dL 70 (1+)*   BLOOD UR  0.2 (2+)*   NITRITE U  NEGATIVE   WBC UR /HPF >50*     IMAGING: CXR reviewed in  images      SIGNATURE: Vikram Perez MD  Nephrology and Hypertension  [address] [facility] [place]  Office phone: [phone]  FAX: [phone]    This note was partially generated using the Dragon voice recognition system, and there may be some incorrect words, spelling's and punctuation that were not noted in checking the note before saving.

## 2024-07-27 NOTE — NURSING NOTE
Patient continued in afib, rates sustaining in 120's-130's. Patient asymptomatic. Dr. YARITZA Seth notified of sustained high heart rate. Will be up to assess patient.

## 2024-07-28 VITALS
HEART RATE: 112 BPM | RESPIRATION RATE: 22 BRPM | TEMPERATURE: 96.3 F | SYSTOLIC BLOOD PRESSURE: 110 MMHG | DIASTOLIC BLOOD PRESSURE: 82 MMHG | HEIGHT: 65 IN | WEIGHT: 209 LBS | OXYGEN SATURATION: 94 % | BODY MASS INDEX: 34.82 KG/M2

## 2024-07-28 LAB
ALBUMIN SERPL BCP-MCNC: 2.7 G/DL (ref 3.4–5)
ALP SERPL-CCNC: 101 U/L (ref 33–136)
ALT SERPL W P-5'-P-CCNC: 12 U/L (ref 7–45)
ANION GAP SERPL CALC-SCNC: 14 MMOL/L (ref 10–20)
AST SERPL W P-5'-P-CCNC: 17 U/L (ref 9–39)
BASO STIPL BLD QL SMEAR: PRESENT
BASOPHILS # BLD AUTO: 0.01 X10*3/UL (ref 0–0.1)
BASOPHILS NFR BLD AUTO: 0.1 %
BILIRUB SERPL-MCNC: 0.5 MG/DL (ref 0–1.2)
BUN SERPL-MCNC: 35 MG/DL (ref 6–23)
CALCIUM SERPL-MCNC: 8.3 MG/DL (ref 8.6–10.3)
CHLORIDE SERPL-SCNC: 107 MMOL/L (ref 98–107)
CO2 SERPL-SCNC: 29 MMOL/L (ref 21–32)
CREAT SERPL-MCNC: 1.34 MG/DL (ref 0.5–1.05)
DACRYOCYTES BLD QL SMEAR: NORMAL
EGFRCR SERPLBLD CKD-EPI 2021: 45 ML/MIN/1.73M*2
EOSINOPHIL # BLD AUTO: 0.01 X10*3/UL (ref 0–0.7)
EOSINOPHIL NFR BLD AUTO: 0.1 %
ERYTHROCYTE [DISTWIDTH] IN BLOOD BY AUTOMATED COUNT: 23.3 % (ref 11.5–14.5)
GLUCOSE BLD MANUAL STRIP-MCNC: 130 MG/DL (ref 74–99)
GLUCOSE BLD MANUAL STRIP-MCNC: 192 MG/DL (ref 74–99)
GLUCOSE BLD MANUAL STRIP-MCNC: 210 MG/DL (ref 74–99)
GLUCOSE BLD MANUAL STRIP-MCNC: 215 MG/DL (ref 74–99)
GLUCOSE SERPL-MCNC: 144 MG/DL (ref 74–99)
HCT VFR BLD AUTO: 30.6 % (ref 36–46)
HGB BLD-MCNC: 9.1 G/DL (ref 12–16)
IMM GRANULOCYTES # BLD AUTO: 0.11 X10*3/UL (ref 0–0.7)
IMM GRANULOCYTES NFR BLD AUTO: 1.3 % (ref 0–0.9)
LYMPHOCYTES # BLD AUTO: 1.26 X10*3/UL (ref 1.2–4.8)
LYMPHOCYTES NFR BLD AUTO: 15.2 %
MAGNESIUM SERPL-MCNC: 1.86 MG/DL (ref 1.6–2.4)
MCH RBC QN AUTO: 29.2 PG (ref 26–34)
MCHC RBC AUTO-ENTMCNC: 29.7 G/DL (ref 32–36)
MCV RBC AUTO: 98 FL (ref 80–100)
MONOCYTES # BLD AUTO: 0.67 X10*3/UL (ref 0.1–1)
MONOCYTES NFR BLD AUTO: 8.1 %
NEUTROPHILS # BLD AUTO: 6.22 X10*3/UL (ref 1.2–7.7)
NEUTROPHILS NFR BLD AUTO: 75.2 %
NRBC BLD-RTO: 1.1 /100 WBCS (ref 0–0)
OVALOCYTES BLD QL SMEAR: NORMAL
PHOSPHATE SERPL-MCNC: 3.8 MG/DL (ref 2.5–4.9)
PLATELET # BLD AUTO: 119 X10*3/UL (ref 150–450)
POLYCHROMASIA BLD QL SMEAR: NORMAL
POTASSIUM SERPL-SCNC: 3.8 MMOL/L (ref 3.5–5.3)
PROT SERPL-MCNC: 5.4 G/DL (ref 6.4–8.2)
RBC # BLD AUTO: 3.12 X10*6/UL (ref 4–5.2)
RBC MORPH BLD: NORMAL
SODIUM SERPL-SCNC: 146 MMOL/L (ref 136–145)
SPHEROCYTES BLD QL SMEAR: NORMAL
WBC # BLD AUTO: 8.3 X10*3/UL (ref 4.4–11.3)

## 2024-07-28 PROCEDURE — 2500000002 HC RX 250 W HCPCS SELF ADMINISTERED DRUGS (ALT 637 FOR MEDICARE OP, ALT 636 FOR OP/ED)

## 2024-07-28 PROCEDURE — 2500000001 HC RX 250 WO HCPCS SELF ADMINISTERED DRUGS (ALT 637 FOR MEDICARE OP)

## 2024-07-28 PROCEDURE — 2060000001 HC INTERMEDIATE ICU ROOM DAILY

## 2024-07-28 PROCEDURE — 2500000004 HC RX 250 GENERAL PHARMACY W/ HCPCS (ALT 636 FOR OP/ED)

## 2024-07-28 PROCEDURE — 82947 ASSAY GLUCOSE BLOOD QUANT: CPT

## 2024-07-28 PROCEDURE — 85025 COMPLETE CBC W/AUTO DIFF WBC: CPT

## 2024-07-28 PROCEDURE — 36415 COLL VENOUS BLD VENIPUNCTURE: CPT

## 2024-07-28 PROCEDURE — 94640 AIRWAY INHALATION TREATMENT: CPT

## 2024-07-28 PROCEDURE — 83735 ASSAY OF MAGNESIUM: CPT

## 2024-07-28 PROCEDURE — 80053 COMPREHEN METABOLIC PANEL: CPT

## 2024-07-28 PROCEDURE — 99233 SBSQ HOSP IP/OBS HIGH 50: CPT | Performed by: INTERNAL MEDICINE

## 2024-07-28 PROCEDURE — 2500000004 HC RX 250 GENERAL PHARMACY W/ HCPCS (ALT 636 FOR OP/ED): Performed by: INTERNAL MEDICINE

## 2024-07-28 PROCEDURE — 84100 ASSAY OF PHOSPHORUS: CPT

## 2024-07-28 RX ORDER — LANOLIN ALCOHOL/MO/W.PET/CERES
800 CREAM (GRAM) TOPICAL ONCE
Status: COMPLETED | OUTPATIENT
Start: 2024-07-28 | End: 2024-07-28

## 2024-07-28 RX ORDER — POTASSIUM CHLORIDE 1.5 G/1.58G
40 POWDER, FOR SOLUTION ORAL ONCE
Status: COMPLETED | OUTPATIENT
Start: 2024-07-28 | End: 2024-07-28

## 2024-07-28 ASSESSMENT — PAIN - FUNCTIONAL ASSESSMENT
PAIN_FUNCTIONAL_ASSESSMENT: 0-10

## 2024-07-28 ASSESSMENT — PAIN SCALES - GENERAL
PAINLEVEL_OUTOF10: 0 - NO PAIN
PAINLEVEL_OUTOF10: 3
PAINLEVEL_OUTOF10: 0 - NO PAIN

## 2024-07-28 ASSESSMENT — COGNITIVE AND FUNCTIONAL STATUS - GENERAL
MOVING FROM LYING ON BACK TO SITTING ON SIDE OF FLAT BED WITH BEDRAILS: A LOT
WALKING IN HOSPITAL ROOM: TOTAL
MOVING TO AND FROM BED TO CHAIR: TOTAL
TOILETING: TOTAL
EATING MEALS: TOTAL
WALKING IN HOSPITAL ROOM: TOTAL
HELP NEEDED FOR BATHING: TOTAL
STANDING UP FROM CHAIR USING ARMS: TOTAL
TOILETING: TOTAL
STANDING UP FROM CHAIR USING ARMS: TOTAL
TURNING FROM BACK TO SIDE WHILE IN FLAT BAD: TOTAL
MOBILITY SCORE: 7
DRESSING REGULAR UPPER BODY CLOTHING: TOTAL
DAILY ACTIVITIY SCORE: 6
MOVING TO AND FROM BED TO CHAIR: TOTAL
TURNING FROM BACK TO SIDE WHILE IN FLAT BAD: TOTAL
PERSONAL GROOMING: TOTAL
DRESSING REGULAR LOWER BODY CLOTHING: TOTAL
MOBILITY SCORE: 7
MOVING FROM LYING ON BACK TO SITTING ON SIDE OF FLAT BED WITH BEDRAILS: A LOT
HELP NEEDED FOR BATHING: TOTAL
EATING MEALS: TOTAL
CLIMB 3 TO 5 STEPS WITH RAILING: TOTAL
DRESSING REGULAR UPPER BODY CLOTHING: TOTAL
CLIMB 3 TO 5 STEPS WITH RAILING: TOTAL
PERSONAL GROOMING: TOTAL

## 2024-07-28 ASSESSMENT — PAIN DESCRIPTION - LOCATION: LOCATION: BACK

## 2024-07-28 NOTE — CARE PLAN
The patient's goals for the shift include  keep pt comfortable and minimize pain    The clinical goals for the shift include Pt will remain hemodynamically stable throughout shift.

## 2024-07-28 NOTE — NURSING NOTE
"2000 A&Ox4.  Contracted.  Repositioned.  Coretta at bedside.  Denied pain.  Afib on tele in the 100's.  Lungs clear.  Skin assessed.  Point of care blood glucose 84.  Cannot tolerate protective Prevalon Boots, so heels off bed with pillow and Mepilex bilateral heels.  Continue to monitor.  2130 Gave juice and moe crackers.  Repositioned.  Notified Dr. Seth \"Ms Holliday is in Afib rate was controlled - now sustaining in 120-130's since about 2130. Asymptomatic. /98. I just gave her evening meds including Lopressor but it is only 25 mg. Last night they had to give her one time dose of IV Lopressor 5mg which helped.\"  Response was to reassess in one hour.  2252  Updated Dr Seth that heart rate has improved to 90's-100's and patient resting.  No new orders.  "

## 2024-07-28 NOTE — CARE PLAN
Problem: Skin  Goal: Decreased wound size/increased tissue granulation at next dressing change  7/28/2024 1735 by Angie Cain RN  Outcome: Progressing  Flowsheets (Taken 7/28/2024 1735)  Decreased wound size/increased tissue granulation at next dressing change:   Promote sleep for wound healing   Protective dressings over bony prominences  7/28/2024 1022 by Angie Cain RN  Outcome: Progressing  7/28/2024 0955 by Angie Cain RN  Outcome: Progressing  Goal: Promote/optimize nutrition  7/28/2024 1735 by Angie Cain RN  Outcome: Progressing  7/28/2024 1022 by Angie Cain RN  Outcome: Progressing  7/28/2024 0955 by Angie Cain RN  Outcome: Progressing     Problem: Fall/Injury  Goal: Be free from injury by end of the shift  Outcome: Progressing  Goal: Verbalize understanding of personal risk factors for fall in the hospital  Outcome: Progressing  Goal: Verbalize understanding of risk factor reduction measures to prevent injury from fall in the home  Outcome: Progressing  Goal: Use assistive devices by end of the shift  Outcome: Progressing  Goal: Pace activities to prevent fatigue by end of the shift  Outcome: Progressing     Problem: Discharge Planning  Goal: Discharge to home or other facility with appropriate resources  Outcome: Progressing     Problem: Chronic Conditions and Co-morbidities  Goal: Patient's chronic conditions and co-morbidity symptoms are monitored and maintained or improved  Outcome: Progressing     Problem: Diabetes  Goal: Achieve decreasing blood glucose levels by end of shift  Outcome: Progressing  Goal: Maintain electrolyte levels within acceptable range throughout shift  Outcome: Progressing  Goal: Learn about and adhere to nutrition recommendations by end of shift  Outcome: Progressing  Goal: Increase self care and/or family involovement by end of shift  Outcome: Progressing  Goal: Receive DSME education by end of shift  Outcome: Progressing   The patient's goals for the shift include       The clinical goals for the shift include pt to remain hemodynamically stable    Over the shift, the patient did not make progress toward the following goals. Barriers to progression include . Recommendations to address these barriers include .

## 2024-07-28 NOTE — CARE PLAN
The patient's goals for the shift include encourage protein intake/ nutrition for healing     The clinical goals for the shift include pt to remain hemodynamically stable

## 2024-07-28 NOTE — PROGRESS NOTES
Bing Holliday is a 63 y.o. female on day 13 of admission presenting with Hypothermia, initial encounter.    Subjective   -Patient seen this morning resting comfortably in bed  -VSS WNL with exception to HR 90s-120s  -NAEON  -Patient's labs morning significant for: BUN/creatinine 35/1.34, hemoglobin 9.1, platelets 119 otherwise unremarkable  -Patient denied all symptomatology pertaining to 12 point ROS.    Objective     Last Recorded Vitals  BP (!) 148/91 (BP Location: Left arm, Patient Position: Lying)   Pulse (!) 128   Temp 35.5 °C (95.9 °F) (Temporal)   Resp 18   Wt 94.8 kg (209 lb)   SpO2 (!) 89%   Intake/Output last 3 Shifts:    Intake/Output Summary (Last 24 hours) at 7/28/2024 1033  Last data filed at 7/28/2024 0229  Gross per 24 hour   Intake 480 ml   Output 600 ml   Net -120 ml     Admission Weight  Weight: 99.8 kg (220 lb 0.3 oz) (07/15/24 1040)    Daily Weight  07/28/24 : 94.8 kg (209 lb)    Image Results  ECG 12 Lead  Atrial fibrillation with rapid ventricular response  Moderate voltage criteria for LVH, may be normal variant ( R in aVL , Tyler product )  ST & T wave abnormality, consider inferior ischemia  Abnormal ECG  When compared with ECG of 26-JUL-2024 05:52, (unconfirmed)  T wave inversion no longer evident in Anterior leads  Confirmed by Evaristo Moran ([id number]) on 7/27/2024 8:05:23 AM  ECG 12 Lead  Atrial fibrillation with rapid ventricular response  Voltage criteria for left ventricular hypertrophy  ST & T wave abnormality, consider inferolateral ischemia or digitalis effect  Abnormal ECG  When compared with ECG of 21-JUL-2024 07:53, (unconfirmed)  Atrial fibrillation has replaced Sinus rhythm  Confirmed by Evaristo Moran ([id number]) on 7/27/2024 8:05:18 AM    Physical Exam:  General: Not in acute distress, A&O x 3, alert, cooperative, ill-appearing, obese  HEENT: Normocephalic, atraumatic, EOMI, moist mucous membranes  Neck: Neck supple, trachea midline, no evidence of trauma  Cardiovascular:  IRR, S1 and S2 appreciated, no murmurs rubs gallops appreciated, distal pulses 2+ bilaterally (radial and dorsalis pedis)  Respiratory: Vesicular breath sounds appreciated bilaterally, no adventitious sounds appreciated, no increased work of breathing  GI: Abdomen soft, nondistended, nontender to palpation, bowel sounds present  Extremities: No edema appreciated in lower extremities bilaterally, no cyanosis, generalized anasarca present, generalized weakness present  Neuro: A&O X3, no focal deficits, strength and sensation intact bilaterally  Skin: Warm and dry, without lesions or rashes, covered skin tear on left cheek      Relevant Results  Scheduled medications  atorvastatin, 40 mg, oral, Nightly  budesonide, 0.5 mg, nebulization, BID  [Held by provider] docusate sodium, 100 mg, oral, BID  folic acid, 1 mg, oral, Daily  [Held by provider] formoterol, 20 mcg, nebulization, BID  [Held by provider] heparin (porcine), 5,000 Units, subcutaneous, q8h  hydrocortisone sodium succinate, 25 mg, intravenous, q12h  insulin lispro, 0-5 Units, subcutaneous, Before meals & nightly  ipratropium-albuteroL, 3 mL, nebulization, TID  lactated Ringer's, 500 mL, intravenous, Once  levETIRAcetam, 500 mg, oral, BID  [Held by provider] melatonin, 5 mg, oral, Nightly  metoprolol tartrate, 25 mg, oral, BID  mycophenolate, 1,000 mg, oral, BID  nystatin, 1 Application, Topical, BID  pantoprazole, 40 mg, oral, Daily before breakfast  [Held by provider] polyethylene glycol, 17 g, oral, Daily  risperiDONE, 3 mg, oral, BID  sertraline, 25 mg, oral, Nightly  [Held by provider] sodium chloride, 2 spray, Each Nostril, TID  thiamine, 100 mg, oral, Daily      Continuous medications       PRN medications  PRN medications: acetaminophen, dextrose, dextrose, glucagon, glucagon, ipratropium-albuteroL, oxygen  Results for orders placed or performed during the hospital encounter of 07/15/24 (from the past 24 hour(s))   POCT GLUCOSE   Result Value Ref Range     POCT Glucose 151 (H) 74 - 99 mg/dL   POCT GLUCOSE   Result Value Ref Range    POCT Glucose 89 74 - 99 mg/dL   POCT GLUCOSE   Result Value Ref Range    POCT Glucose 84 74 - 99 mg/dL   POCT GLUCOSE   Result Value Ref Range    POCT Glucose 125 (H) 74 - 99 mg/dL   Comprehensive Metabolic Panel   Result Value Ref Range    Glucose 144 (H) 74 - 99 mg/dL    Sodium 146 (H) 136 - 145 mmol/L    Potassium 3.8 3.5 - 5.3 mmol/L    Chloride 107 98 - 107 mmol/L    Bicarbonate 29 21 - 32 mmol/L    Anion Gap 14 10 - 20 mmol/L    Urea Nitrogen 35 (H) 6 - 23 mg/dL    Creatinine 1.34 (H) 0.50 - 1.05 mg/dL    eGFR 45 (L) >60 mL/min/1.73m*2    Calcium 8.3 (L) 8.6 - 10.3 mg/dL    Albumin 2.7 (L) 3.4 - 5.0 g/dL    Alkaline Phosphatase 101 33 - 136 U/L    Total Protein 5.4 (L) 6.4 - 8.2 g/dL    AST 17 9 - 39 U/L    Bilirubin, Total 0.5 0.0 - 1.2 mg/dL    ALT 12 7 - 45 U/L   Magnesium   Result Value Ref Range    Magnesium 1.86 1.60 - 2.40 mg/dL   Phosphorus   Result Value Ref Range    Phosphorus 3.8 2.5 - 4.9 mg/dL   CBC and Auto Differential   Result Value Ref Range    WBC 8.3 4.4 - 11.3 x10*3/uL    nRBC 1.1 (H) 0.0 - 0.0 /100 WBCs    RBC 3.12 (L) 4.00 - 5.20 x10*6/uL    Hemoglobin 9.1 (L) 12.0 - 16.0 g/dL    Hematocrit 30.6 (L) 36.0 - 46.0 %    MCV 98 80 - 100 fL    MCH 29.2 26.0 - 34.0 pg    MCHC 29.7 (L) 32.0 - 36.0 g/dL    RDW 23.3 (H) 11.5 - 14.5 %    Platelets 119 (L) 150 - 450 x10*3/uL    Neutrophils % 75.2 40.0 - 80.0 %    Immature Granulocytes %, Automated 1.3 (H) 0.0 - 0.9 %    Lymphocytes % 15.2 13.0 - 44.0 %    Monocytes % 8.1 2.0 - 10.0 %    Eosinophils % 0.1 0.0 - 6.0 %    Basophils % 0.1 0.0 - 2.0 %    Neutrophils Absolute 6.22 1.20 - 7.70 x10*3/uL    Immature Granulocytes Absolute, Automated 0.11 0.00 - 0.70 x10*3/uL    Lymphocytes Absolute 1.26 1.20 - 4.80 x10*3/uL    Monocytes Absolute 0.67 0.10 - 1.00 x10*3/uL    Eosinophils Absolute 0.01 0.00 - 0.70 x10*3/uL    Basophils Absolute 0.01 0.00 - 0.10 x10*3/uL   Morphology    Result Value Ref Range    RBC Morphology See Below     Polychromasia Mild     Spherocytes Few     Ovalocytes Few     Teardrop Cells Few     Basophilic Stippling Present    POCT GLUCOSE   Result Value Ref Range    POCT Glucose 130 (H) 74 - 99 mg/dL     *Note: Due to a large number of results and/or encounters for the requested time period, some results have not been displayed. A complete set of results can be found in Results Review.     No results found.    Assessment/Plan   Active Problems:  There are no active Hospital Problems.    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 Patient was found to be in shock requiring pressors due to either sepsis or adrenal insufficiency and was admitted to the ICU. There was concern for GI bleed and multiple specialists were consulted before the patient was hemodynamically stable and deemed appropriate for downgrade to the general medicine service. There was hemodynamic instability with afib RVR and the patient was upgraded back to the ICU before being stabilized and downgraded. Patient is to be transferred to [facility] due to concerns regarding underlying rheumatologic etiology causing symptoms.     Shock 2/2 sepsis versus adrenal insufficiency  AMS likely 2/2 above  Hypothermia - resolved  -Pressors weaned off on 7/16 and again on 7/21  -Blood and urine cultures negative  -Cdiff and stool pathogen panel negative  -CT CAP showing diverticulitis at the hepatic flexure without abscess or obvious perforation, there are new likely inflammatory nodules in the right middle and left lower lobe, as well as new small right pleural  effusion with small amount of ascites  -Repeat CXR showing mild patchy bilateral ground glass opacities, with possible minimal/small pleural effusions  -Completed course of Zosyn (7/17 - 7/22)  -Continue hydrocortisone 25 mg IV q12hr, will wean back to home steroids per endo  -Endocrinology consulted, there is concern that the patient is pocketing pills as she continues to present with adrenal insufficiency. Endocrine counseled the patient on the significant danger of this activity. Appreciate recs  -PT/OT  -SLP following     4. Epistaxis  5. Hematochezia  6. Normocytic anemia  7. Thrombocytopenia  -S/p 3 units pRBCs due to anemia and 1 unit platelets  -Fecal occult positive  -CT scan showing diverticulitis  -Rhino rockets removed and replaced, Merocel sponge and afrin as needed to prevent oozing/further bleed  -Protonix 40 mg daily  -Augmentin as prophylaxis given epistaxis and rhino rocket,, Rhino Rocket removed on 7/25/2024  -GI consulted, recommended supportive care, conservative management, possible CTA if active bleeding presents. Patient has poor GFR making CTA difficult to obtain, and is a poor colonoscopy candidate. Ultimately recommended continuing GOC and hospice discussions before signing off. Appreciate recs  -ENT consulted, rhino rockets to control bleeding with antibiotic prophylaxis. Appreciate recs  -Hematology consulted, recommended outpatient biopsy in 4 weeks. Appreciate recs.     8. ATN 2/2 ischemia due to sepsis and GI bleed  9. CKD3b  10 Hypernatremia - resolved  -Creatinine today 1.34, baseline 1.6-1.9  -S/p 1 L of D5W  -Avoid nephrotoxic agents  -Renally dose medications  -Hold home 40 mg lasix PO daily at this time per nephrology's recommendation  -Montano in place, continue strict I/Os  -Nephrology consulted, no indication for urgent RRT. Appreciate recs     11. SLE  -Case was discussed with the patients rheumatologist Dr. Hoyos, who had not seen the patient in at least a year but agreed with  holding Cellcept in the setting of above.  -Resumed Cellcept after completion of abx  -There is concern that rheumatologic etiology is causing presenting symptoms, patient to be transferred [facility]     12. Afib with RVR  -Not currently on anticoagulation due to bleed risk  -Metoprolol tartrate 25 mg BID  -Patient in RVR today with rates 90s-130s   -Optimize electrolytes with goal K >4.0 and Mg > 2.0  -Telemetry     13. Malnutrition  Patient has low albumin and protein and third spaces significant amount of fluid, will diurese as tolerated and highly encourage PO intake with protein supplementation.  -Corpak unable to be placed due to above, patient is a poor candidate for PEG tube  -PPN dc/ed on 7/25     14. COPD  -Scheduled and prn duonebs  -Stable on RA     15. 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  Internal Medicine PGY-2

## 2024-07-28 NOTE — PROGRESS NOTES
INPATIENT NEPHROLOGY CONSULT PROGRESS NOTE      Patient Name: Bing Holliday MRN: [id number]  DATE of SERVICE: July 28, 2024  TIME of SERVICE: 12:23 PM  CONSULTING SERVICE: Nephrology    REASON for CONSULT: JED    SUBJECTIVE:  Patient seen and evaluated at bedside, resting in bed comfortably.  Sats well on room air.   Oral intake marginal  Renal parameters with mildly uptrending serum creatinine up to 1.3 mg deciliter GFR down to 45 with uptrending sodium level of 146. The changes most likely secondary to poor solute intake.  Free water deficit    SUMMARY OF STAY:  Ms. Holliday is a 63 y.o. female who presented to [facility] July 15, 2024 for evaluation of weakness noted at skilled nursing facility.  Diagnosed with septic shock, acute diverticulitis, acute diverticular bleed and admitted to the intensive care unit.  Patient with complex past medical history including lupus nephritis class V in 2011, systemic lupus erythematosus with chronic kidney disease stage IIIb,  Cerebritis and arthropathy, uncontrolled type 2 diabetes mellitus, paroxysmal atrial fibrillation, heart failure with reduced ejection fraction, coronary artery disease status post CABG 2013, COPD, pulmonary hypertension, hypertension, hyperlipidemia, GERD.    ASSESSMENT:  Acute kidney injury superimposed on chronic kidney disease stage IIIb:  -- Acute kidney injury likely ischemic related ATN in the setting of septic shock and acute GI bleed, peak serum creatinine up to 2.6 mg deciliter with drop in GFR to 20 mill per minute per 1.73 m²  --From baseline serum creatinine of 1.5 and EGFR of 40 mill per minute per 1.73 m²  --Immunocompromised host with pancytopenia  --Acute diverticulitis with diverticular bleed, s/p blood transfusion.   --Montano catheter in place no signs of obstruction  --On background CKD 3B, lupus nephritis class V  --Code status DNR/DNI no dialysis   --Patient reports  that her mother passed During hemodialysis session and she does not wish to be on hemodialysis.  --Renal parameters fluctuating based on volume status     Septic shock secondary to acute diverticulitis: Resolved  -- Was hypothermic on presentation  -- Shock resolved, off pressor support     Acute hypoxic respiratory failure requiring intermittent Venturi mask     Volume: Tendency to third space due to hypoalbuminemia, CKD, diabetes patient with underlying abdominal situs.  -- Chest x-ray with findings concerning for pulmonary edema  -- Intermittent volume expansion with oncotic solution to maintain hemodynamic stability.     Acute blood loss anemia, GI team following patient at high risk to have procedure.     Immunocompromised     Systemic lupus erythematosus managed with mycophenolate and prednisone.     Adrenal insufficiency: On hydrocortisone 50 mg 3 times daily.     Pancytopenia     Pulmonary hypertension     Paroxysmal atrial fibrillation, rate controlled       PLAN:  Acute kidney injury with fluctuating renal parameters due to fluctuation in volume status  Renal function panel improved serum creatinine up to 1.3 mg/L.  To continue to hold diuretics.  Hemodynamically stable sats well on room air.  Hypernatremia: to discontinue furosemide since patient off TPN and oral intake remains marginal.  To encourage free water intake  Metabolic acidosis corrected off sodium bicarbonates  To cont protein supplements 3 times daily.  Strict input and output to guide volume management.     Pending transfer to [facility]    Will follow, thank you!    Medications:    Current Facility-Administered Medications:     acetaminophen (Tylenol) tablet 650 mg, 650 mg, oral, q4h PRN, Andrew Aranda MD    atorvastatin (Lipitor) tablet 40 mg, 40 mg, oral, Nightly, Andrew Aranda MD, 40 mg at 07/27/24 2669    budesonide (Pulmicort) 0.5 mg/2 mL nebulizer solution 0.5 mg, 0.5 mg, nebulization, BID, Andrew Aranda MD, 0.5 mg at 07/28/24 3302     dextrose 50 % injection 12.5 g, 12.5 g, intravenous, q15 min PRN, Andrew Aranda MD    dextrose 50 % injection 25 g, 25 g, intravenous, q15 min PRN, Andrew Aranda MD    [Held by provider] docusate sodium (Colace) capsule 100 mg, 100 mg, oral, BID, Cheko Angel DO    folic acid (Folvite) tablet 1 mg, 1 mg, oral, Daily, Andrew Aranda MD, 1 mg at 07/28/24 0829    [Held by provider] formoterol (Perforomist) 20 mcg/2 mL nebulizer solution 20 mcg, 20 mcg, nebulization, BID, Donald Busby MD, 20 mcg at 07/22/24 0943    glucagon (Glucagen) injection 1 mg, 1 mg, intramuscular, q15 min PRN, Andrew Aranda MD    glucagon (Glucagen) injection 1 mg, 1 mg, intramuscular, q15 min PRN, Andrew Aranda MD    [Held by provider] heparin (porcine) injection 5,000 Units, 5,000 Units, subcutaneous, q8h, Cheko Angel DO, 5,000 Units at 07/16/24 2349    hydrocortisone sod succ (PF) (Solu-CORTEF) injection 25 mg, 25 mg, intravenous, q12h, Avi Sloan DO, 25 mg at 07/28/24 1332    insulin lispro (HumaLOG) injection 0-5 Units, 0-5 Units, subcutaneous, Before meals & nightly, Andrew Aranda MD, 2 Units at 07/28/24 1653    ipratropium-albuteroL (Duo-Neb) 0.5-2.5 mg/3 mL nebulizer solution 3 mL, 3 mL, nebulization, q4h PRN, Andrew Aranda MD    ipratropium-albuteroL (Duo-Neb) 0.5-2.5 mg/3 mL nebulizer solution 3 mL, 3 mL, nebulization, TID, Andrew Aranda MD, 3 mL at 07/28/24 1428    lactated Ringer's bolus 500 mL, 500 mL, intravenous, Once, Cody Seth MD    levETIRAcetam (Keppra) tablet 500 mg, 500 mg, oral, BID, Cheko Angel DO, 500 mg at 07/28/24 0829    [Held by provider] melatonin tablet 5 mg, 5 mg, oral, Nightly, Cheko Angel DO    metoprolol tartrate (Lopressor) tablet 25 mg, 25 mg, oral, BID, Andrew Aranda MD, 25 mg at 07/28/24 0828    mycophenolate (Cellcept) capsule 1,000 mg, 1,000 mg, oral, BID, Andrew Aranda MD, 1,000 mg at 07/28/24 0830    nystatin (Mycostatin) 100,000 unit/gram powder 1 Application, 1  Application, Topical, BID, Andrew Aranda MD, 1 Application at 07/28/24 0833    oxygen (O2) therapy, , inhalation, Continuous PRN - O2/gases, Andrew Aranda MD, 30 percent at 07/20/24 0850    pantoprazole (ProtoNix) EC tablet 40 mg, 40 mg, oral, Daily before breakfast, Cheko Angel DO, 40 mg at 07/28/24 0833    [Held by provider] polyethylene glycol (Glycolax, Miralax) packet 17 g, 17 g, oral, Daily, Andrew Aranda MD, 17 g at 07/21/24 0848    risperiDONE (RisperDAL) tablet 3 mg, 3 mg, oral, BID, Andrew Aranda MD, 3 mg at 07/28/24 0830    sertraline (Zoloft) tablet 25 mg, 25 mg, oral, Nightly, Andrew Aranda MD, 25 mg at 07/27/24 2138    [Held by provider] sodium chloride (Ocean) 0.65 % nasal spray 2 spray, 2 spray, Each Nostril, TID, Cheko Angel DO, 2 spray at 07/25/24 1239    thiamine (Vitamin B-1) tablet 100 mg, 100 mg, oral, Daily, Andrew Aranda MD, 100 mg at 07/28/24 0829    PERTINENT ROS:  GENERAL:  positive for fatigue, poor appetite.  No fever/chills  RESPIRATORY:  positive for shortness of breath.  Negative for cough, wheezing.  CARDIOVASCULAR:   Negative for chest pain or palpitation.  GI:  Negative for abdominal pain, diarrhea, heartburn, nausea, vomiting      Physical Exam:  Vital signs in last 24 hours:  Temp:  [35.3 °C (95.5 °F)-36.6 °C (97.9 °F)] 35.7 °C (96.3 °F)  Heart Rate:  [] 97  Resp:  [18-22] 18  BP: (114-153)/(72-98) 114/72    General: Awake, not in acute distress  HEENT: Nasal packing removed  NECK:  no  JVD, no carotid bruit, supple, no cervical mass or thyromegaly  LUNGS;  Diminished breath sounds, no Rales  CV:  Distant, regular rate and rhythm, no murmurs  ABDOMEN:  abdomen soft, nontender, BS normal, no masses or organomegaly  EDEMA:  no lower extremity edema/dependent edema  SKIN:  dry and normal turgor, no clubbing, cyanosis or petechia.  No rashes noted      Intake/Output last 3 shifts:  I/O last 3 completed shifts:  In: 480 (5.1 mL/kg) [P.O.:480]  Out: 600 (6.3 mL/kg)  [Urine:600 (0.2 mL/kg/hr)]  Weight: 94.8 kg     DATA:  Diagnotic tests reviewed for Todays visit:  Results from last 7 days   Lab Units 07/28/24  0447   WBC AUTO x10*3/uL 8.3   RBC AUTO x10*6/uL 3.12*   HEMOGLOBIN g/dL 9.1*   HEMATOCRIT % 30.6*     Results from last 7 days   Lab Units 07/28/24  0447   SODIUM mmol/L 146*   POTASSIUM mmol/L 3.8   CHLORIDE mmol/L 107   CO2 mmol/L 29   BUN mg/dL 35*   CREATININE mg/dL 1.34*   CALCIUM mg/dL 8.3*   PHOSPHORUS mg/dL 3.8   MAGNESIUM mg/dL 1.86   BILIRUBIN TOTAL mg/dL 0.5   ALT U/L 12   AST U/L 17           IMAGING: CXR reviewed in  images      SIGNATURE: Vikram Perez MD  Nephrology and Hypertension  [address] [facility] [place]  Office phone: [phone]  FAX: [phone]    This note was partially generated using the Dragon voice recognition system, and there may be some incorrect words, spelling's and punctuation that were not noted in checking the note before saving.

## 2024-07-29 LAB
ALBUMIN SERPL BCP-MCNC: 2.6 G/DL (ref 3.4–5)
ALP SERPL-CCNC: 108 U/L (ref 33–136)
ALT SERPL W P-5'-P-CCNC: 11 U/L (ref 7–45)
ANION GAP SERPL CALC-SCNC: 13 MMOL/L (ref 10–20)
AST SERPL W P-5'-P-CCNC: 13 U/L (ref 9–39)
BASOPHILS # BLD AUTO: 0.01 X10*3/UL (ref 0–0.1)
BASOPHILS NFR BLD AUTO: 0.1 %
BILIRUB SERPL-MCNC: 0.4 MG/DL (ref 0–1.2)
BUN SERPL-MCNC: 36 MG/DL (ref 6–23)
CALCIUM SERPL-MCNC: 8.4 MG/DL (ref 8.6–10.3)
CHLORIDE SERPL-SCNC: 110 MMOL/L (ref 98–107)
CO2 SERPL-SCNC: 27 MMOL/L (ref 21–32)
CREAT SERPL-MCNC: 1.5 MG/DL (ref 0.5–1.05)
DACRYOCYTES BLD QL SMEAR: NORMAL
EGFRCR SERPLBLD CKD-EPI 2021: 39 ML/MIN/1.73M*2
EOSINOPHIL # BLD AUTO: 0.01 X10*3/UL (ref 0–0.7)
EOSINOPHIL NFR BLD AUTO: 0.1 %
ERYTHROCYTE [DISTWIDTH] IN BLOOD BY AUTOMATED COUNT: 23.5 % (ref 11.5–14.5)
GLUCOSE BLD MANUAL STRIP-MCNC: 151 MG/DL (ref 74–99)
GLUCOSE BLD MANUAL STRIP-MCNC: 154 MG/DL (ref 74–99)
GLUCOSE BLD MANUAL STRIP-MCNC: 184 MG/DL (ref 74–99)
GLUCOSE BLD MANUAL STRIP-MCNC: 185 MG/DL (ref 74–99)
GLUCOSE SERPL-MCNC: 206 MG/DL (ref 74–99)
HCT VFR BLD AUTO: 32.7 % (ref 36–46)
HGB BLD-MCNC: 9.7 G/DL (ref 12–16)
IMM GRANULOCYTES # BLD AUTO: 0.06 X10*3/UL (ref 0–0.7)
IMM GRANULOCYTES NFR BLD AUTO: 0.8 % (ref 0–0.9)
LYMPHOCYTES # BLD AUTO: 1.69 X10*3/UL (ref 1.2–4.8)
LYMPHOCYTES NFR BLD AUTO: 23.7 %
MAGNESIUM SERPL-MCNC: 1.96 MG/DL (ref 1.6–2.4)
MCH RBC QN AUTO: 29.2 PG (ref 26–34)
MCHC RBC AUTO-ENTMCNC: 29.7 G/DL (ref 32–36)
MCV RBC AUTO: 99 FL (ref 80–100)
MONOCYTES # BLD AUTO: 0.79 X10*3/UL (ref 0.1–1)
MONOCYTES NFR BLD AUTO: 11.1 %
NEUTROPHILS # BLD AUTO: 4.57 X10*3/UL (ref 1.2–7.7)
NEUTROPHILS NFR BLD AUTO: 64.2 %
NRBC BLD-RTO: 0.8 /100 WBCS (ref 0–0)
OVALOCYTES BLD QL SMEAR: NORMAL
PHOSPHATE SERPL-MCNC: 3.5 MG/DL (ref 2.5–4.9)
PLATELET # BLD AUTO: 117 X10*3/UL (ref 150–450)
POLYCHROMASIA BLD QL SMEAR: NORMAL
POTASSIUM SERPL-SCNC: 4.4 MMOL/L (ref 3.5–5.3)
PROT SERPL-MCNC: 5.4 G/DL (ref 6.4–8.2)
RBC # BLD AUTO: 3.32 X10*6/UL (ref 4–5.2)
RBC MORPH BLD: NORMAL
SODIUM SERPL-SCNC: 146 MMOL/L (ref 136–145)
WBC # BLD AUTO: 7.1 X10*3/UL (ref 4.4–11.3)

## 2024-07-29 PROCEDURE — 2500000004 HC RX 250 GENERAL PHARMACY W/ HCPCS (ALT 636 FOR OP/ED)

## 2024-07-29 PROCEDURE — 2500000002 HC RX 250 W HCPCS SELF ADMINISTERED DRUGS (ALT 637 FOR MEDICARE OP, ALT 636 FOR OP/ED)

## 2024-07-29 PROCEDURE — 2500000002 HC RX 250 W HCPCS SELF ADMINISTERED DRUGS (ALT 637 FOR MEDICARE OP, ALT 636 FOR OP/ED): Performed by: INTERNAL MEDICINE

## 2024-07-29 PROCEDURE — 80053 COMPREHEN METABOLIC PANEL: CPT

## 2024-07-29 PROCEDURE — 83735 ASSAY OF MAGNESIUM: CPT

## 2024-07-29 PROCEDURE — 82947 ASSAY GLUCOSE BLOOD QUANT: CPT

## 2024-07-29 PROCEDURE — 2500000004 HC RX 250 GENERAL PHARMACY W/ HCPCS (ALT 636 FOR OP/ED): Performed by: INTERNAL MEDICINE

## 2024-07-29 PROCEDURE — 2500000001 HC RX 250 WO HCPCS SELF ADMINISTERED DRUGS (ALT 637 FOR MEDICARE OP)

## 2024-07-29 PROCEDURE — 2500000001 HC RX 250 WO HCPCS SELF ADMINISTERED DRUGS (ALT 637 FOR MEDICARE OP): Performed by: INTERNAL MEDICINE

## 2024-07-29 PROCEDURE — 94640 AIRWAY INHALATION TREATMENT: CPT

## 2024-07-29 PROCEDURE — 85025 COMPLETE CBC W/AUTO DIFF WBC: CPT

## 2024-07-29 PROCEDURE — 99233 SBSQ HOSP IP/OBS HIGH 50: CPT

## 2024-07-29 PROCEDURE — 84100 ASSAY OF PHOSPHORUS: CPT

## 2024-07-29 PROCEDURE — 2060000001 HC INTERMEDIATE ICU ROOM DAILY

## 2024-07-29 PROCEDURE — 36415 COLL VENOUS BLD VENIPUNCTURE: CPT

## 2024-07-29 RX ORDER — HYDROCORTISONE 5 MG/1
10 TABLET ORAL
Status: DISCONTINUED | OUTPATIENT
Start: 2024-07-29 | End: 2024-07-30 | Stop reason: HOSPADM

## 2024-07-29 RX ORDER — HYDROCORTISONE 20 MG/1
20 TABLET ORAL DAILY
Status: DISCONTINUED | OUTPATIENT
Start: 2024-07-29 | End: 2024-07-30 | Stop reason: HOSPADM

## 2024-07-29 ASSESSMENT — PAIN - FUNCTIONAL ASSESSMENT
PAIN_FUNCTIONAL_ASSESSMENT: 0-10

## 2024-07-29 ASSESSMENT — PAIN SCALES - GENERAL
PAINLEVEL_OUTOF10: 0 - NO PAIN

## 2024-07-29 NOTE — CARE PLAN
The patient's goals for the shift include      The clinical goals for the shift include pt to remain hemodynamically stable throughout shift      Problem: Skin  Goal: Decreased wound size/increased tissue granulation at next dressing change  Outcome: Progressing  Goal: Promote/optimize nutrition  Outcome: Progressing     Problem: Fall/Injury  Goal: Be free from injury by end of the shift  Outcome: Progressing  Goal: Verbalize understanding of personal risk factors for fall in the hospital  Outcome: Progressing  Goal: Verbalize understanding of risk factor reduction measures to prevent injury from fall in the home  Outcome: Progressing  Goal: Use assistive devices by end of the shift  Outcome: Progressing  Goal: Pace activities to prevent fatigue by end of the shift  Outcome: Progressing     Problem: Discharge Planning  Goal: Discharge to home or other facility with appropriate resources  Outcome: Progressing     Problem: Chronic Conditions and Co-morbidities  Goal: Patient's chronic conditions and co-morbidity symptoms are monitored and maintained or improved  Outcome: Progressing     Problem: Diabetes  Goal: Achieve decreasing blood glucose levels by end of shift  Outcome: Progressing  Goal: Maintain electrolyte levels within acceptable range throughout shift  Outcome: Progressing  Goal: Learn about and adhere to nutrition recommendations by end of shift  Outcome: Progressing  Goal: Increase self care and/or family involovement by end of shift  Outcome: Progressing  Goal: Receive DSME education by end of shift  Outcome: Progressing

## 2024-07-29 NOTE — PROGRESS NOTES
INPATIENT NEPHROLOGY CONSULT PROGRESS NOTE      Patient Name: Bing Holliday MRN: [id number]  DATE of SERVICE: July 29, 2024  TIME of SERVICE: 10:31 AM  CONSULTING SERVICE: Nephrology    REASON for CONSULT: JED    SUBJECTIVE:  Patient seen and evaluated at bedside sitting in bed.  More awake today.  Renal parameters uptrending creatinine up to 1.5 and sodium still at 146.  Oral intake is gradually improving.   Will hold on adding IV fluids right now due to low EF of 40%.    SUMMARY OF STAY:  Ms. Holliday is a 63 y.o. female who presented to [facility] July 15, 2024 for evaluation of weakness noted at skilled nursing facility.  Diagnosed with septic shock, acute diverticulitis, acute diverticular bleed and admitted to the intensive care unit.  Patient with complex past medical history including lupus nephritis class V in 2011, systemic lupus erythematosus with chronic kidney disease stage IIIb,  Cerebritis and arthropathy, uncontrolled type 2 diabetes mellitus, paroxysmal atrial fibrillation, heart failure with reduced ejection fraction, coronary artery disease status post CABG 2013, COPD, pulmonary hypertension, hypertension, hyperlipidemia, GERD.    ASSESSMENT:  Acute kidney injury superimposed on chronic kidney disease stage IIIb:  -- Acute kidney injury likely ischemic related ATN in the setting of septic shock and acute GI bleed, peak serum creatinine up to 2.6 mg deciliter with drop in GFR to 20 mill per minute per 1.73 m²  --From baseline serum creatinine of 1.5 and EGFR of 40 mill per minute per 1.73 m²  --Immunocompromised host with pancytopenia  --Acute diverticulitis with diverticular bleed, s/p blood transfusion.   --Montano catheter in place no signs of obstruction  --On background CKD 3B, lupus nephritis class V  --Code status DNR/DNI no dialysis   --Patient reports that her mother passed During hemodialysis session and she does not wish to  be on hemodialysis.  --Renal parameters fluctuating based on volume status     Septic shock secondary to acute diverticulitis: Resolved  -- Was hypothermic on presentation  -- Shock resolved, off pressor support     Acute hypoxic respiratory failure requiring intermittent Venturi mask     Volume: Tendency to third space due to hypoalbuminemia, CKD, diabetes patient with underlying abdominal situs.  -- Chest x-ray with findings concerning for pulmonary edema  -- Intermittent volume expansion with oncotic solution to maintain hemodynamic stability.     Acute blood loss anemia, GI team following patient at high risk to have procedure.     Immunocompromised     Systemic lupus erythematosus managed with mycophenolate and prednisone.     Adrenal insufficiency: On hydrocortisone 50 mg 3 times daily.     Pancytopenia     Pulmonary hypertension     Paroxysmal atrial fibrillation, rate controlled       PLAN:  Hypernatremia persist sodium level at 146, to continue to encourage free water intake.  Will hold on adding IV fluids due to reduced EF and tendency to third space due to hypoalbuminemia.  To continue to hold diuretics  Hydrocortisone switch back to oral.  To cont protein supplements 3 times daily.  Strict input and output to guide volume management.     Pending transfer to [facility]    Will follow, thank you!    Medications:    Current Facility-Administered Medications:     acetaminophen (Tylenol) tablet 650 mg, 650 mg, oral, q4h PRN, Andrew Aranda MD, 650 mg at 07/28/24 2137    atorvastatin (Lipitor) tablet 40 mg, 40 mg, oral, Nightly, Andrew Aranda MD, 40 mg at 07/28/24 2137    budesonide (Pulmicort) 0.5 mg/2 mL nebulizer solution 0.5 mg, 0.5 mg, nebulization, BID, Andrew Aranda MD, 0.5 mg at 07/29/24 0928    dextrose 50 % injection 12.5 g, 12.5 g, intravenous, q15 min PRN, Andrew Aranda MD    dextrose 50 % injection 25 g, 25 g, intravenous, q15 min PRN, Andrew Aranda MD    [Held by provider] docusate sodium  (Colace) capsule 100 mg, 100 mg, oral, BID, Cheko Angel DO    folic acid (Folvite) tablet 1 mg, 1 mg, oral, Daily, Andrew Aranda MD, 1 mg at 07/29/24 0818    [Held by provider] formoterol (Perforomist) 20 mcg/2 mL nebulizer solution 20 mcg, 20 mcg, nebulization, BID, Donald Busby MD, 20 mcg at 07/22/24 0943    glucagon (Glucagen) injection 1 mg, 1 mg, intramuscular, q15 min PRN, Andrew Aranda MD    glucagon (Glucagen) injection 1 mg, 1 mg, intramuscular, q15 min PRN, Andrew Aranda MD    [Held by provider] heparin (porcine) injection 5,000 Units, 5,000 Units, subcutaneous, q8h, Cheko Angel DO, 5,000 Units at 07/16/24 2349    hydrocortisone (Cortef) tablet 10 mg, 10 mg, oral, Daily, Avi ERIC LopeznDO, 10 mg at 07/29/24 1445    hydrocortisone (Cortef) tablet 20 mg, 20 mg, oral, Daily, Avi Z Johnn DO, 20 mg at 07/29/24 1128    insulin lispro (HumaLOG) injection 0-5 Units, 0-5 Units, subcutaneous, Before meals & nightly, Andrew Aranda MD, 1 Units at 07/29/24 0821    ipratropium-albuteroL (Duo-Neb) 0.5-2.5 mg/3 mL nebulizer solution 3 mL, 3 mL, nebulization, q4h PRN, Andrew Aranda MD    ipratropium-albuteroL (Duo-Neb) 0.5-2.5 mg/3 mL nebulizer solution 3 mL, 3 mL, nebulization, TID, Andrew Aranda MD, 3 mL at 07/29/24 1541    lactated Ringer's bolus 500 mL, 500 mL, intravenous, Once, Cody Seth MD    levETIRAcetam (Keppra) tablet 500 mg, 500 mg, oral, BID, Cheko Angel DO, 500 mg at 07/29/24 0818    [Held by provider] melatonin tablet 5 mg, 5 mg, oral, Nightly, Cheko Jeanne, DO    metoprolol tartrate (Lopressor) tablet 25 mg, 25 mg, oral, BID, Andrew Aranda MD, 25 mg at 07/29/24 0818    mycophenolate (Cellcept) capsule 1,000 mg, 1,000 mg, oral, BID, Andrew Aranda MD, 1,000 mg at 07/29/24 0817    nystatin (Mycostatin) 100,000 unit/gram powder 1 Application, 1 Application, Topical, BID, Andrew Aranda MD, 1 Application at 07/28/24 8936    oxygen (O2) therapy, , inhalation, Continuous PRN -  O2/gases, Andrew Aranda MD, 30 percent at 07/20/24 0850    pantoprazole (ProtoNix) EC tablet 40 mg, 40 mg, oral, Daily before breakfast, Cheko Angel DO, 40 mg at 07/28/24 0833    [Held by provider] polyethylene glycol (Glycolax, Miralax) packet 17 g, 17 g, oral, Daily, Andrew Aranda MD, 17 g at 07/21/24 0848    risperiDONE (RisperDAL) tablet 3 mg, 3 mg, oral, BID, Andrew Aranda MD, 3 mg at 07/29/24 0818    sertraline (Zoloft) tablet 25 mg, 25 mg, oral, Nightly, Andrew Aranda MD, 25 mg at 07/28/24 2137    [Held by provider] sodium chloride (Ocean) 0.65 % nasal spray 2 spray, 2 spray, Each Nostril, TID, Cheko Angel DO, 2 spray at 07/25/24 1239    thiamine (Vitamin B-1) tablet 100 mg, 100 mg, oral, Daily, Andrew Aranda MD, 100 mg at 07/29/24 0818    PERTINENT ROS:  GENERAL:  positive for fatigue, poor appetite.  No fever/chills  RESPIRATORY:  positive for shortness of breath.  Negative for cough, wheezing.  CARDIOVASCULAR:   Negative for chest pain or palpitation.  GI:  Negative for abdominal pain, diarrhea, heartburn, nausea, vomiting      Physical Exam:  Vital signs in last 24 hours:  Temp:  [35.1 °C (95.2 °F)-35.7 °C (96.3 °F)] 35.1 °C (95.2 °F)  Heart Rate:  [] 96  Resp:  [14-22] 14  BP: (102-135)/(58-82) 105/58    General: Awake, not in acute distress  HEENT: Nasal packing removed  NECK:  no  JVD, no carotid bruit, supple, no cervical mass or thyromegaly  LUNGS;  Diminished breath sounds, no Rales  CV:  Distant, regular rate and rhythm, no murmurs  ABDOMEN:  abdomen soft, nontender, BS normal, no masses or organomegaly  EDEMA:  no lower extremity edema/dependent edema  SKIN:  dry and normal turgor, no clubbing, cyanosis or petechia.  No rashes noted      Intake/Output last 3 shifts:  I/O last 3 completed shifts:  In: 1320 (13.9 mL/kg) [P.O.:1320]  Out: 1100 (11.6 mL/kg) [Urine:1100 (0.3 mL/kg/hr)]  Weight: 94.9 kg     DATA:  Diagnotic tests reviewed for Todays visit:  Results from last 7 days   Lab  Units 07/29/24  0744   WBC AUTO x10*3/uL 7.1   RBC AUTO x10*6/uL 3.32*   HEMOGLOBIN g/dL 9.7*   HEMATOCRIT % 32.7*     Results from last 7 days   Lab Units 07/29/24  0744   SODIUM mmol/L 146*   POTASSIUM mmol/L 4.4   CHLORIDE mmol/L 110*   CO2 mmol/L 27   BUN mg/dL 36*   CREATININE mg/dL 1.50*   CALCIUM mg/dL 8.4*   PHOSPHORUS mg/dL 3.5   MAGNESIUM mg/dL 1.96   BILIRUBIN TOTAL mg/dL 0.4   ALT U/L 11   AST U/L 13           IMAGING: CXR reviewed in  images      SIGNATURE: Vikram Perez MD  Nephrology and Hypertension  [address] [place]  Office phone: [phone]  FAX: [phone]    This note was partially generated using the Dragon voice recognition system, and there may be some incorrect words, spelling's and punctuation that were not noted in checking the note before saving.

## 2024-07-29 NOTE — PROGRESS NOTES
"Bing Holliday is a 63 y.o. female on day 14 of admission presenting with Hypothermia, initial encounter.    Subjective   Patient asymptomatic overnight. Sitting up eating breakfast.     Objective     Physical Exam:  General: Not in acute distress, A&O x 3, alert, cooperative, ill-appearing, obese  HEENT: Normocephalic, atraumatic, EOMI, moist mucous membranes  Neck: Neck supple, trachea midline, no evidence of trauma  Cardiovascular: IRR, S1 and S2 appreciated, no murmurs rubs gallops appreciated, distal pulses 2+ bilaterally (radial and dorsalis pedis)  Respiratory: Vesicular breath sounds appreciated bilaterally, no adventitious sounds appreciated, no increased work of breathing  GI: Abdomen soft, nondistended, nontender to palpation, bowel sounds present  Extremities: No edema appreciated in lower extremities bilaterally, no cyanosis, generalized anasarca present, generalized weakness present  Neuro: A&O X3, no focal deficits, strength and sensation intact bilaterally  Skin: Warm and dry, without lesions or rashes, covered skin tear on left cheek    Last Recorded Vitals  Blood pressure 105/58, pulse 96, temperature 35.1 °C (95.2 °F), temperature source Temporal, resp. rate 14, height 1.651 m (5' 5\"), weight 94.9 kg (209 lb 3.5 oz), SpO2 94%.  Intake/Output last 3 Shifts:  I/O last 3 completed shifts:  In: 1320 (13.9 mL/kg) [P.O.:1320]  Out: 1100 (11.6 mL/kg) [Urine:1100 (0.3 mL/kg/hr)]  Weight: 94.9 kg     Relevant Results  Scheduled medications  atorvastatin, 40 mg, oral, Nightly  budesonide, 0.5 mg, nebulization, BID  [Held by provider] docusate sodium, 100 mg, oral, BID  folic acid, 1 mg, oral, Daily  [Held by provider] formoterol, 20 mcg, nebulization, BID  [Held by provider] heparin (porcine), 5,000 Units, subcutaneous, q8h  hydrocortisone, 10 mg, oral, Daily  hydrocortisone, 20 mg, oral, Daily  insulin lispro, 0-5 Units, subcutaneous, Before meals & nightly  ipratropium-albuteroL, 3 mL, nebulization, " TID  lactated Ringer's, 500 mL, intravenous, Once  levETIRAcetam, 500 mg, oral, BID  [Held by provider] melatonin, 5 mg, oral, Nightly  metoprolol tartrate, 25 mg, oral, BID  mycophenolate, 1,000 mg, oral, BID  nystatin, 1 Application, Topical, BID  pantoprazole, 40 mg, oral, Daily before breakfast  [Held by provider] polyethylene glycol, 17 g, oral, Daily  risperiDONE, 3 mg, oral, BID  sertraline, 25 mg, oral, Nightly  [Held by provider] sodium chloride, 2 spray, Each Nostril, TID  thiamine, 100 mg, oral, Daily      Continuous medications     PRN medications  PRN medications: acetaminophen, dextrose, dextrose, glucagon, glucagon, ipratropium-albuteroL, oxygen  Results from last 7 days   Lab Units 07/29/24  0744 07/28/24  0447 07/27/24  0357   WBC AUTO x10*3/uL 7.1 8.3 8.6   RBC AUTO x10*6/uL 3.32* 3.12* 3.38*   HEMOGLOBIN g/dL 9.7* 9.1* 9.7*     Results from last 7 days   Lab Units 07/29/24  0744 07/28/24  0447 07/27/24  0357   SODIUM mmol/L 146* 146* 146*   POTASSIUM mmol/L 4.4 3.8 3.7   CHLORIDE mmol/L 110* 107 108*   CO2 mmol/L 27 29 27   BUN mg/dL 36* 35* 36*   CREATININE mg/dL 1.50* 1.34* 1.10*   CALCIUM mg/dL 8.4* 8.3* 8.3*   PHOSPHORUS mg/dL 3.5 3.8 3.6   MAGNESIUM mg/dL 1.96 1.86 1.93   BILIRUBIN TOTAL mg/dL 0.4 0.5 0.4   ALT U/L 11 12 13   AST U/L 13 17 18       No results found.     Assessment/Plan   Active Problems:  There are no active Hospital Problems.    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 Patient was found to be in shock requiring pressors due to either sepsis or adrenal insufficiency and was admitted to the ICU. There was concern for GI  bleed and multiple specialists were consulted before the patient was hemodynamically stable and deemed appropriate for downgrade to the general medicine service. There was hemodynamic instability with afib RVR and the patient was upgraded back to the ICU before being stabilized and downgraded. Patient is to be transferred to [facility] due to concerns regarding underlying rheumatologic etiology causing symptoms.     Shock 2/2 sepsis versus adrenal insufficiency  AMS likely 2/2 above  Hypothermia - resolved  -Pressors weaned off on 7/16 and again on 7/21  -Blood and urine cultures negative  -Cdiff and stool pathogen panel negative  -CT CAP showing diverticulitis at the hepatic flexure without abscess or obvious perforation, there are new likely inflammatory nodules in the right middle and left lower lobe, as well as new small right pleural effusion with small amount of ascites  -Repeat CXR showing mild patchy bilateral ground glass opacities, with possible minimal/small pleural effusions  -Completed course of Zosyn (7/17 - 7/22)  -Discontinue IV hydrocortisone per Endo, start home Cortef PO 20mg in AM, 10mg PM  -Endocrinology consulted, there is concern that the patient is pocketing pills as she continues to present with adrenal insufficiency. Endocrine counseled the patient on the significant danger of this activity. Appreciate recs  -PT/OT  -SLP following     4. Epistaxis  5. Hematochezia  6. Normocytic anemia  7. Thrombocytopenia  -S/p 3 units pRBCs due to anemia and 1 unit platelets  -Fecal occult positive  -CT scan showing diverticulitis  -Rhino rockets removed and replaced, Merocel sponge and afrin as needed to prevent oozing/further bleed  -Protonix 40 mg daily  -Augmentin as prophylaxis given epistaxis and rhino rocket,, Rhino Rocket removed on 7/25/2024  -GI consulted, recommended supportive care, conservative management, possible CTA if active bleeding presents. Patient has poor GFR making CTA difficult to  obtain, and is a poor colonoscopy candidate. Ultimately recommended continuing GOC and hospice discussions before signing off. Appreciate recs  -ENT consulted, rhino rockets to control bleeding with antibiotic prophylaxis. Appreciate recs  -Hematology consulted, recommended outpatient biopsy in 4 weeks. Appreciate recs.     8. ATN 2/2 ischemia due to sepsis and GI bleed  9. CKD3b  10 Hypernatremia - resolved  -Creatinine today 1.34, baseline 1.6-1.9  -S/p 1 L of D5W  -Avoid nephrotoxic agents  -Renally dose medications  -Hold home 40 mg lasix PO daily at this time per nephrology's recommendation  -Montano in place, continue strict I/Os  -Nephrology consulted, no indication for urgent RRT. Appreciate recs     11. SLE  -Case was discussed with the patients rheumatologist Dr. Hoyos, who had not seen the patient in at least a year but agreed with holding Cellcept in the setting of above.  -Resumed Cellcept after completion of abx  -There is concern that rheumatologic etiology is causing presenting symptoms, patient to be transferred to [facility]     12. Afib with RVR  -Not currently on anticoagulation due to bleed risk  -Metoprolol tartrate 25 mg BID  -Patient in RVR today with rates 90s-130s   -Optimize electrolytes with goal K >4.0 and Mg > 2.0  -Telemetry     13. Malnutrition  Patient has low albumin and protein and third spaces significant amount of fluid, will diurese as tolerated and highly encourage PO intake with protein supplementation.  -Corpak unable to be placed due to above, patient is a poor candidate for PEG tube  -PPN dc/ed on 7/25     14. COPD  -Scheduled and prn duonebs  -Stable on RA     15. IDDM2  -Holding home diabetic medications  -SSI     # HTN  # HLD - atorvastatin  # Seizures - keppra  # Psychosis - risperidone  # Osteoporosis 2/2 chronic glucocorticoid - will need outpatient bisphophonates  -Continue home medications and management as appropriate     IVF: None at this time  Diet: Regular pureed with  protein supplements  DVT prophylaxis: Held in the setting of bleed  Dispo: Anticipate 3-5 additional days hospital stay pending transfer to [facility] [place] for rheumatologic evaluation  Consults: GI, Nephro, ENT, Endo, Hematology, Palliative, SLP     CODE STATUS: DNR, DNI okay with ICU      Lauri Hostoffer, DO

## 2024-07-29 NOTE — PROGRESS NOTES
"    Endocrinology Inpatient Consult Progress Note     PATIENT NAME: Bing Holliday  MRN: [id number]  DATE: 7/29/2024    CONSULTING PHYSICIAN: Dr. Lambert  REASON FOR CONSULT: AI.      Interval Events     Seen in the IMCU.  The patient is awaiting transfer to [facility] to see rheumatology.  She denies any nausea or vomiting.  She states that she is been eating most of her meals.  She states that she is been able to tolerate pills.      Physical Examination     /65   Pulse 98   Temp 35.7 °C (96.3 °F) (Temporal)   Resp 20   Ht 1.651 m (5' 5\")   Wt 94.9 kg (209 lb 3.5 oz)   LMP  (LMP Unknown)   SpO2 92%   BMI 34.82 kg/m²   NAD  Temporal wasting.  RRR  Non labored resp  Nil pedal edema      Medications     Reviewed MAR       Data     Recent Labs and Imaging Reviewed      Assessment / Plan        # Adrenal Insufficiency  Per my previous notes.  Patient is clinically improved.  Given that she is able to tolerate pills, I will try to transition her to an oral regimen.  We will start hydrocortisone 20 mg in the morning and 10 mg in the afternoon.  I have placed these orders.  Low threshold to put her back on IV glucocorticoids if she has change in her vital signs.    # Hypoglycemia  # Uncontrolled Type 2 Diabetes Mellitus  There has been no hypoglycemia in the last 72 hours.  Continue her modified sliding scale.  I am okay with her being hyperglycemic given her many comorbidities.  She stands a lot more to lose with hypoglycemia.     # Pancytopenia  Maybe related to MMF which has been held.     # Abnormal TFTs  I have reviewed the patient's TSH values for the last 5 years.  She has been more time in the hospital than outside of the hospital during this time.  She has multiple TSH values which I suspect have been ordered due to encephalopathy.  She does not have any TSH values greater than 10.  On admission it was in the 4 range.  I do think this is secondary to nonthyroidal illness.  Her free T4 is normal " which is reassuring.  I recommend no intervention on her TSH value.     # Osteoporosis  In the setting of chronic glucocorticoid use. This will be further managed as an outpatient. She probably would benefit from a bisphosphonate, these agents are best studied with steroid-induced adynamic bone disease.         Avi Sloan, DO  Endocrinology, Diabetes, and Metabolism    Available via [employer] Messenger    Please excuse any typographical or unwanted errors within this documentation as voice recognition software was used to dictate this note.

## 2024-07-30 ENCOUNTER — HOSPITAL ENCOUNTER (INPATIENT)
Facility: HOSPITAL | Age: 64
LOS: 3 days | Discharge: NURSING, REHAB OR LONG-TERM CARE | End: 2024-08-02
Attending: STUDENT IN AN ORGANIZED HEALTH CARE EDUCATION/TRAINING PROGRAM | Admitting: INTERNAL MEDICINE
Payer: MEDICARE

## 2024-07-30 ENCOUNTER — APPOINTMENT (OUTPATIENT)
Dept: CARDIOLOGY | Facility: HOSPITAL | Age: 64
End: 2024-07-30
Payer: MEDICARE

## 2024-07-30 VITALS
DIASTOLIC BLOOD PRESSURE: 84 MMHG | RESPIRATION RATE: 18 BRPM | SYSTOLIC BLOOD PRESSURE: 114 MMHG | WEIGHT: 209.22 LBS | TEMPERATURE: 96.4 F | HEIGHT: 65 IN | OXYGEN SATURATION: 94 % | HEART RATE: 110 BPM | BODY MASS INDEX: 34.86 KG/M2

## 2024-07-30 DIAGNOSIS — K51.011 ULCERATIVE (CHRONIC) PANCOLITIS WITH RECTAL BLEEDING (MULTI): ICD-10-CM

## 2024-07-30 DIAGNOSIS — E11.42 DM TYPE 2 WITH DIABETIC PERIPHERAL NEUROPATHY (MULTI): Chronic | ICD-10-CM

## 2024-07-30 DIAGNOSIS — R73.9 HYPERGLYCEMIA: ICD-10-CM

## 2024-07-30 DIAGNOSIS — M32.9 LUPUS (MULTI): Primary | ICD-10-CM

## 2024-07-30 DIAGNOSIS — I48.91 ATRIAL FIBRILLATION, UNSPECIFIED TYPE (MULTI): ICD-10-CM

## 2024-07-30 LAB
ALBUMIN SERPL BCP-MCNC: 2.4 G/DL (ref 3.4–5)
ALBUMIN SERPL BCP-MCNC: 2.5 G/DL (ref 3.4–5)
ALBUMIN SERPL BCP-MCNC: 2.6 G/DL (ref 3.4–5)
ALP SERPL-CCNC: 108 U/L (ref 33–136)
ALP SERPL-CCNC: 117 U/L (ref 33–136)
ALT SERPL W P-5'-P-CCNC: 11 U/L (ref 7–45)
ALT SERPL W P-5'-P-CCNC: 9 U/L (ref 7–45)
ANION GAP SERPL CALC-SCNC: 12 MMOL/L (ref 10–20)
ANION GAP SERPL CALC-SCNC: 14 MMOL/L (ref 10–20)
ANION GAP SERPL CALC-SCNC: 15 MMOL/L (ref 10–20)
AST SERPL W P-5'-P-CCNC: 11 U/L (ref 9–39)
AST SERPL W P-5'-P-CCNC: 11 U/L (ref 9–39)
ATRIAL RATE: 115 BPM
BASOPHILS # BLD AUTO: 0.01 X10*3/UL (ref 0–0.1)
BASOPHILS # BLD AUTO: 0.03 X10*3/UL (ref 0–0.1)
BASOPHILS NFR BLD AUTO: 0.2 %
BASOPHILS NFR BLD AUTO: 0.4 %
BILIRUB SERPL-MCNC: 0.4 MG/DL (ref 0–1.2)
BILIRUB SERPL-MCNC: 0.4 MG/DL (ref 0–1.2)
BUN SERPL-MCNC: 35 MG/DL (ref 6–23)
BUN SERPL-MCNC: 36 MG/DL (ref 6–23)
BUN SERPL-MCNC: 39 MG/DL (ref 6–23)
BURR CELLS BLD QL SMEAR: NORMAL
CALCIUM SERPL-MCNC: 8.4 MG/DL (ref 8.6–10.6)
CALCIUM SERPL-MCNC: 8.5 MG/DL (ref 8.6–10.6)
CALCIUM SERPL-MCNC: 8.6 MG/DL (ref 8.6–10.6)
CHLORIDE SERPL-SCNC: 109 MMOL/L (ref 98–107)
CHLORIDE SERPL-SCNC: 110 MMOL/L (ref 98–107)
CHLORIDE SERPL-SCNC: 112 MMOL/L (ref 98–107)
CO2 SERPL-SCNC: 26 MMOL/L (ref 21–32)
CO2 SERPL-SCNC: 27 MMOL/L (ref 21–32)
CO2 SERPL-SCNC: 29 MMOL/L (ref 21–32)
CREAT SERPL-MCNC: 1.57 MG/DL (ref 0.5–1.05)
CREAT SERPL-MCNC: 1.6 MG/DL (ref 0.5–1.05)
CREAT SERPL-MCNC: 1.6 MG/DL (ref 0.5–1.05)
EGFRCR SERPLBLD CKD-EPI 2021: 36 ML/MIN/1.73M*2
EGFRCR SERPLBLD CKD-EPI 2021: 36 ML/MIN/1.73M*2
EGFRCR SERPLBLD CKD-EPI 2021: 37 ML/MIN/1.73M*2
EOSINOPHIL # BLD AUTO: 0 X10*3/UL (ref 0–0.7)
EOSINOPHIL # BLD AUTO: 0.02 X10*3/UL (ref 0–0.7)
EOSINOPHIL NFR BLD AUTO: 0 %
EOSINOPHIL NFR BLD AUTO: 0.3 %
ERYTHROCYTE [DISTWIDTH] IN BLOOD BY AUTOMATED COUNT: 22.5 % (ref 11.5–14.5)
ERYTHROCYTE [DISTWIDTH] IN BLOOD BY AUTOMATED COUNT: 23.1 % (ref 11.5–14.5)
GLUCOSE BLD MANUAL STRIP-MCNC: 138 MG/DL (ref 74–99)
GLUCOSE BLD MANUAL STRIP-MCNC: 155 MG/DL (ref 74–99)
GLUCOSE BLD MANUAL STRIP-MCNC: 159 MG/DL (ref 74–99)
GLUCOSE BLD MANUAL STRIP-MCNC: 87 MG/DL (ref 74–99)
GLUCOSE SERPL-MCNC: 103 MG/DL (ref 74–99)
GLUCOSE SERPL-MCNC: 126 MG/DL (ref 74–99)
GLUCOSE SERPL-MCNC: 171 MG/DL (ref 74–99)
HCT VFR BLD AUTO: 29.2 % (ref 36–46)
HCT VFR BLD AUTO: 32.2 % (ref 36–46)
HGB BLD-MCNC: 8.7 G/DL (ref 12–16)
HGB BLD-MCNC: 9.4 G/DL (ref 12–16)
IMM GRANULOCYTES # BLD AUTO: 0.04 X10*3/UL (ref 0–0.7)
IMM GRANULOCYTES # BLD AUTO: 0.05 X10*3/UL (ref 0–0.7)
IMM GRANULOCYTES NFR BLD AUTO: 0.7 % (ref 0–0.9)
IMM GRANULOCYTES NFR BLD AUTO: 0.7 % (ref 0–0.9)
LYMPHOCYTES # BLD AUTO: 2.23 X10*3/UL (ref 1.2–4.8)
LYMPHOCYTES # BLD AUTO: 2.75 X10*3/UL (ref 1.2–4.8)
LYMPHOCYTES NFR BLD AUTO: 37.4 %
LYMPHOCYTES NFR BLD AUTO: 39.6 %
MCH RBC QN AUTO: 28.5 PG (ref 26–34)
MCH RBC QN AUTO: 28.7 PG (ref 26–34)
MCHC RBC AUTO-ENTMCNC: 29.2 G/DL (ref 32–36)
MCHC RBC AUTO-ENTMCNC: 29.8 G/DL (ref 32–36)
MCV RBC AUTO: 96 FL (ref 80–100)
MCV RBC AUTO: 99 FL (ref 80–100)
MONOCYTES # BLD AUTO: 0.61 X10*3/UL (ref 0.1–1)
MONOCYTES # BLD AUTO: 1.01 X10*3/UL (ref 0.1–1)
MONOCYTES NFR BLD AUTO: 10.8 %
MONOCYTES NFR BLD AUTO: 13.7 %
NEUTROPHILS # BLD AUTO: 2.74 X10*3/UL (ref 1.2–7.7)
NEUTROPHILS # BLD AUTO: 3.49 X10*3/UL (ref 1.2–7.7)
NEUTROPHILS NFR BLD AUTO: 47.5 %
NEUTROPHILS NFR BLD AUTO: 48.7 %
NRBC BLD-RTO: 1.1 /100 WBCS (ref 0–0)
NRBC BLD-RTO: 1.5 /100 WBCS (ref 0–0)
PHOSPHATE SERPL-MCNC: 3.7 MG/DL (ref 2.5–4.9)
PHOSPHATE SERPL-MCNC: 4 MG/DL (ref 2.5–4.9)
PLATELET # BLD AUTO: 112 X10*3/UL (ref 150–450)
PLATELET # BLD AUTO: 113 X10*3/UL (ref 150–450)
POLYCHROMASIA BLD QL SMEAR: NORMAL
POTASSIUM SERPL-SCNC: 4 MMOL/L (ref 3.5–5.3)
POTASSIUM SERPL-SCNC: 4.3 MMOL/L (ref 3.5–5.3)
POTASSIUM SERPL-SCNC: 4.5 MMOL/L (ref 3.5–5.3)
PROT SERPL-MCNC: 5 G/DL (ref 6.4–8.2)
PROT SERPL-MCNC: 5.4 G/DL (ref 6.4–8.2)
Q ONSET: 214 MS
QRS COUNT: 16 BEATS
QRS DURATION: 92 MS
QT INTERVAL: 362 MS
QTC CALCULATION(BAZETT): 469 MS
QTC FREDERICIA: 430 MS
R AXIS: 192 DEGREES
RBC # BLD AUTO: 3.05 X10*6/UL (ref 4–5.2)
RBC # BLD AUTO: 3.27 X10*6/UL (ref 4–5.2)
RBC MORPH BLD: NORMAL
RBC MORPH BLD: NORMAL
SODIUM SERPL-SCNC: 147 MMOL/L (ref 136–145)
T AXIS: -14 DEGREES
T OFFSET: 395 MS
UFH PPP CHRO-ACNC: 1.1 IU/ML
UFH PPP CHRO-ACNC: 1.7 IU/ML
UFH PPP CHRO-ACNC: 2 IU/ML
VENTRICULAR RATE: 101 BPM
WBC # BLD AUTO: 5.6 X10*3/UL (ref 4.4–11.3)
WBC # BLD AUTO: 7.4 X10*3/UL (ref 4.4–11.3)

## 2024-07-30 PROCEDURE — 85025 COMPLETE CBC W/AUTO DIFF WBC: CPT

## 2024-07-30 PROCEDURE — 2500000001 HC RX 250 WO HCPCS SELF ADMINISTERED DRUGS (ALT 637 FOR MEDICARE OP)

## 2024-07-30 PROCEDURE — 80053 COMPREHEN METABOLIC PANEL: CPT

## 2024-07-30 PROCEDURE — 82947 ASSAY GLUCOSE BLOOD QUANT: CPT

## 2024-07-30 PROCEDURE — 36415 COLL VENOUS BLD VENIPUNCTURE: CPT

## 2024-07-30 PROCEDURE — 1200000002 HC GENERAL ROOM WITH TELEMETRY DAILY

## 2024-07-30 PROCEDURE — 2500000004 HC RX 250 GENERAL PHARMACY W/ HCPCS (ALT 636 FOR OP/ED)

## 2024-07-30 PROCEDURE — C9113 INJ PANTOPRAZOLE SODIUM, VIA: HCPCS

## 2024-07-30 PROCEDURE — 85520 HEPARIN ASSAY: CPT

## 2024-07-30 PROCEDURE — 97161 PT EVAL LOW COMPLEX 20 MIN: CPT | Mod: GP | Performed by: PHYSICAL THERAPIST

## 2024-07-30 PROCEDURE — 2500000002 HC RX 250 W HCPCS SELF ADMINISTERED DRUGS (ALT 637 FOR MEDICARE OP, ALT 636 FOR OP/ED)

## 2024-07-30 PROCEDURE — 93005 ELECTROCARDIOGRAM TRACING: CPT

## 2024-07-30 PROCEDURE — 92610 EVALUATE SWALLOWING FUNCTION: CPT | Mod: GN

## 2024-07-30 PROCEDURE — 84100 ASSAY OF PHOSPHORUS: CPT

## 2024-07-30 PROCEDURE — 80069 RENAL FUNCTION PANEL: CPT | Mod: CCI

## 2024-07-30 PROCEDURE — 99223 1ST HOSP IP/OBS HIGH 75: CPT

## 2024-07-30 RX ORDER — PANTOPRAZOLE SODIUM 40 MG/10ML
40 INJECTION, POWDER, LYOPHILIZED, FOR SOLUTION INTRAVENOUS DAILY
Status: DISCONTINUED | OUTPATIENT
Start: 2024-07-30 | End: 2024-08-02 | Stop reason: HOSPADM

## 2024-07-30 RX ORDER — POLYETHYLENE GLYCOL 3350 17 G/17G
17 POWDER, FOR SOLUTION ORAL DAILY
Status: DISCONTINUED | OUTPATIENT
Start: 2024-07-30 | End: 2024-08-02 | Stop reason: HOSPADM

## 2024-07-30 RX ORDER — HYDROCORTISONE 20 MG/1
20 TABLET ORAL EVERY MORNING
Status: DISCONTINUED | OUTPATIENT
Start: 2024-07-31 | End: 2024-08-02 | Stop reason: HOSPADM

## 2024-07-30 RX ORDER — IPRATROPIUM BROMIDE AND ALBUTEROL SULFATE 2.5; .5 MG/3ML; MG/3ML
3 SOLUTION RESPIRATORY (INHALATION) EVERY 4 HOURS PRN
Status: DISCONTINUED | OUTPATIENT
Start: 2024-07-30 | End: 2024-08-02 | Stop reason: HOSPADM

## 2024-07-30 RX ORDER — ATORVASTATIN CALCIUM 40 MG/1
40 TABLET, FILM COATED ORAL NIGHTLY
Status: DISCONTINUED | OUTPATIENT
Start: 2024-07-30 | End: 2024-08-02 | Stop reason: HOSPADM

## 2024-07-30 RX ORDER — DEXTROSE 50 % IN WATER (D50W) INTRAVENOUS SYRINGE
25
Status: DISCONTINUED | OUTPATIENT
Start: 2024-07-30 | End: 2024-08-02 | Stop reason: HOSPADM

## 2024-07-30 RX ORDER — LEVETIRACETAM 500 MG/1
500 TABLET ORAL 2 TIMES DAILY
Status: DISCONTINUED | OUTPATIENT
Start: 2024-07-30 | End: 2024-07-30

## 2024-07-30 RX ORDER — ACETAMINOPHEN 325 MG/1
650 TABLET ORAL EVERY 6 HOURS PRN
Status: DISCONTINUED | OUTPATIENT
Start: 2024-07-30 | End: 2024-08-02 | Stop reason: HOSPADM

## 2024-07-30 RX ORDER — MYCOPHENOLATE MOFETIL 250 MG/1
1000 CAPSULE ORAL 2 TIMES DAILY
Status: DISCONTINUED | OUTPATIENT
Start: 2024-07-30 | End: 2024-08-02 | Stop reason: HOSPADM

## 2024-07-30 RX ORDER — DEXTROSE 50 % IN WATER (D50W) INTRAVENOUS SYRINGE
12.5
Status: DISCONTINUED | OUTPATIENT
Start: 2024-07-30 | End: 2024-08-02 | Stop reason: HOSPADM

## 2024-07-30 RX ORDER — LEVETIRACETAM 5 MG/ML
500 INJECTION INTRAVASCULAR EVERY 12 HOURS
Status: DISCONTINUED | OUTPATIENT
Start: 2024-07-30 | End: 2024-07-30

## 2024-07-30 RX ORDER — HEPARIN SODIUM 10000 [USP'U]/100ML
0-4500 INJECTION, SOLUTION INTRAVENOUS CONTINUOUS
Status: DISCONTINUED | OUTPATIENT
Start: 2024-07-30 | End: 2024-07-31

## 2024-07-30 RX ORDER — HEPARIN SODIUM 5000 [USP'U]/ML
5000 INJECTION, SOLUTION INTRAVENOUS; SUBCUTANEOUS EVERY 8 HOURS
Status: DISCONTINUED | OUTPATIENT
Start: 2024-07-30 | End: 2024-07-30

## 2024-07-30 RX ORDER — HYDROCORTISONE 20 MG/1
20 TABLET ORAL EVERY MORNING
Status: DISCONTINUED | OUTPATIENT
Start: 2024-07-30 | End: 2024-07-30

## 2024-07-30 RX ORDER — METOPROLOL TARTRATE 25 MG/1
25 TABLET, FILM COATED ORAL 2 TIMES DAILY
Status: DISCONTINUED | OUTPATIENT
Start: 2024-07-30 | End: 2024-08-02 | Stop reason: HOSPADM

## 2024-07-30 RX ORDER — INSULIN LISPRO 100 [IU]/ML
0-5 INJECTION, SOLUTION INTRAVENOUS; SUBCUTANEOUS
Status: DISCONTINUED | OUTPATIENT
Start: 2024-07-30 | End: 2024-08-02 | Stop reason: HOSPADM

## 2024-07-30 RX ORDER — BUDESONIDE 0.5 MG/2ML
0.5 INHALANT ORAL
Status: DISCONTINUED | OUTPATIENT
Start: 2024-07-30 | End: 2024-08-02 | Stop reason: HOSPADM

## 2024-07-30 RX ORDER — HYDROCORTISONE 10 MG/1
10 TABLET ORAL EVERY EVENING
Status: DISCONTINUED | OUTPATIENT
Start: 2024-07-30 | End: 2024-08-02 | Stop reason: HOSPADM

## 2024-07-30 RX ORDER — LEVETIRACETAM 500 MG/1
500 TABLET ORAL 2 TIMES DAILY
Status: DISCONTINUED | OUTPATIENT
Start: 2024-07-30 | End: 2024-08-02 | Stop reason: HOSPADM

## 2024-07-30 RX ORDER — HYDROCORTISONE 10 MG/1
10 TABLET ORAL EVERY EVENING
Status: DISCONTINUED | OUTPATIENT
Start: 2024-07-30 | End: 2024-07-30

## 2024-07-30 SDOH — SOCIAL STABILITY: SOCIAL INSECURITY: DOES ANYONE TRY TO KEEP YOU FROM HAVING/CONTACTING OTHER FRIENDS OR DOING THINGS OUTSIDE YOUR HOME?: UNABLE TO ASSESS

## 2024-07-30 SDOH — SOCIAL STABILITY: SOCIAL INSECURITY: DO YOU FEEL ANYONE HAS EXPLOITED OR TAKEN ADVANTAGE OF YOU FINANCIALLY OR OF YOUR PERSONAL PROPERTY?: UNABLE TO ASSESS

## 2024-07-30 SDOH — SOCIAL STABILITY: SOCIAL INSECURITY: DO YOU FEEL UNSAFE GOING BACK TO THE PLACE WHERE YOU ARE LIVING?: UNABLE TO ASSESS

## 2024-07-30 SDOH — SOCIAL STABILITY: SOCIAL INSECURITY: ARE THERE ANY APPARENT SIGNS OF INJURIES/BEHAVIORS THAT COULD BE RELATED TO ABUSE/NEGLECT?: UNABLE TO ASSESS

## 2024-07-30 SDOH — SOCIAL STABILITY: SOCIAL INSECURITY: HAVE YOU HAD ANY THOUGHTS OF HARMING ANYONE ELSE?: UNABLE TO ASSESS

## 2024-07-30 SDOH — SOCIAL STABILITY: SOCIAL INSECURITY: HAS ANYONE EVER THREATENED TO HURT YOUR FAMILY OR YOUR PETS?: UNABLE TO ASSESS

## 2024-07-30 SDOH — SOCIAL STABILITY: SOCIAL INSECURITY: WERE YOU ABLE TO COMPLETE ALL THE BEHAVIORAL HEALTH SCREENINGS?: YES

## 2024-07-30 SDOH — SOCIAL STABILITY: SOCIAL INSECURITY: ARE YOU OR HAVE YOU BEEN THREATENED OR ABUSED PHYSICALLY, EMOTIONALLY, OR SEXUALLY BY ANYONE?: UNABLE TO ASSESS

## 2024-07-30 SDOH — SOCIAL STABILITY: SOCIAL INSECURITY: ABUSE: ADULT

## 2024-07-30 SDOH — SOCIAL STABILITY: SOCIAL INSECURITY: HAVE YOU HAD THOUGHTS OF HARMING ANYONE ELSE?: UNABLE TO ASSESS

## 2024-07-30 ASSESSMENT — COGNITIVE AND FUNCTIONAL STATUS - GENERAL
CLIMB 3 TO 5 STEPS WITH RAILING: TOTAL
WALKING IN HOSPITAL ROOM: TOTAL
DRESSING REGULAR UPPER BODY CLOTHING: A LITTLE
MOBILITY SCORE: 10
DRESSING REGULAR UPPER BODY CLOTHING: A LITTLE
MOVING TO AND FROM BED TO CHAIR: A LOT
MOBILITY SCORE: 8
WALKING IN HOSPITAL ROOM: TOTAL
TOILETING: A LOT
MOVING TO AND FROM BED TO CHAIR: A LOT
PATIENT BASELINE BEDBOUND: UNABLE TO ASSESS AT THIS TIME
EATING MEALS: A LITTLE
PERSONAL GROOMING: A LOT
TURNING FROM BACK TO SIDE WHILE IN FLAT BAD: A LOT
MOBILITY SCORE: 10
MOVING TO AND FROM BED TO CHAIR: A LOT
DRESSING REGULAR LOWER BODY CLOTHING: A LOT
MOVING FROM LYING ON BACK TO SITTING ON SIDE OF FLAT BED WITH BEDRAILS: A LOT
MOVING FROM LYING ON BACK TO SITTING ON SIDE OF FLAT BED WITH BEDRAILS: A LOT
STANDING UP FROM CHAIR USING ARMS: TOTAL
DAILY ACTIVITIY SCORE: 13
STANDING UP FROM CHAIR USING ARMS: TOTAL
EATING MEALS: A LITTLE
MOVING TO AND FROM BED TO CHAIR: TOTAL
PERSONAL GROOMING: A LOT
WALKING IN HOSPITAL ROOM: TOTAL
CLIMB 3 TO 5 STEPS WITH RAILING: TOTAL
DAILY ACTIVITIY SCORE: 15
DAILY ACTIVITIY SCORE: 15
TURNING FROM BACK TO SIDE WHILE IN FLAT BAD: A LOT
CLIMB 3 TO 5 STEPS WITH RAILING: TOTAL
TURNING FROM BACK TO SIDE WHILE IN FLAT BAD: A LOT
MOVING FROM LYING ON BACK TO SITTING ON SIDE OF FLAT BED WITH BEDRAILS: A LOT
HELP NEEDED FOR BATHING: A LOT
HELP NEEDED FOR BATHING: A LOT
MOBILITY SCORE: 9
EATING MEALS: A LITTLE
TOILETING: A LOT
PERSONAL GROOMING: A LOT
TOILETING: A LOT
STANDING UP FROM CHAIR USING ARMS: A LOT
HELP NEEDED FOR BATHING: A LOT
WALKING IN HOSPITAL ROOM: TOTAL
STANDING UP FROM CHAIR USING ARMS: A LOT
DRESSING REGULAR LOWER BODY CLOTHING: A LITTLE
DRESSING REGULAR UPPER BODY CLOTHING: A LOT
MOVING FROM LYING ON BACK TO SITTING ON SIDE OF FLAT BED WITH BEDRAILS: A LOT
CLIMB 3 TO 5 STEPS WITH RAILING: TOTAL
TURNING FROM BACK TO SIDE WHILE IN FLAT BAD: A LOT
DRESSING REGULAR LOWER BODY CLOTHING: A LITTLE

## 2024-07-30 ASSESSMENT — ACTIVITIES OF DAILY LIVING (ADL)
ADL_ASSISTANCE: NEEDS ASSISTANCE
TOILETING: DEPENDENT
ADEQUATE_TO_COMPLETE_ADL: YES
LACK_OF_TRANSPORTATION: NO
FEEDING YOURSELF: NEEDS ASSISTANCE
GROOMING: DEPENDENT
HEARING - LEFT EAR: FUNCTIONAL
ASSISTIVE_DEVICE: OTHER (COMMENT)
BATHING: DEPENDENT
DRESSING YOURSELF: DEPENDENT
HEARING - RIGHT EAR: FUNCTIONAL
PATIENT'S MEMORY ADEQUATE TO SAFELY COMPLETE DAILY ACTIVITIES?: UNABLE TO ASSESS
LACK_OF_TRANSPORTATION: PATIENT UNABLE TO ANSWER
WALKS IN HOME: DEPENDENT
JUDGMENT_ADEQUATE_SAFELY_COMPLETE_DAILY_ACTIVITIES: YES

## 2024-07-30 ASSESSMENT — PAIN SCALES - GENERAL
PAINLEVEL_OUTOF10: 0 - NO PAIN

## 2024-07-30 ASSESSMENT — LIFESTYLE VARIABLES
AUDIT-C TOTAL SCORE: -1
HOW OFTEN DO YOU HAVE A DRINK CONTAINING ALCOHOL: PATIENT UNABLE TO ANSWER
AUDIT-C TOTAL SCORE: -1
HOW MANY STANDARD DRINKS CONTAINING ALCOHOL DO YOU HAVE ON A TYPICAL DAY: PATIENT UNABLE TO ANSWER
HOW OFTEN DO YOU HAVE 6 OR MORE DRINKS ON ONE OCCASION: PATIENT UNABLE TO ANSWER
SKIP TO QUESTIONS 9-10: 0

## 2024-07-30 ASSESSMENT — PAIN - FUNCTIONAL ASSESSMENT
PAIN_FUNCTIONAL_ASSESSMENT: 0-10

## 2024-07-30 NOTE — CARE PLAN
Problem: Skin  Goal: Decreased wound size/increased tissue granulation at next dressing change  Outcome: Progressing  Flowsheets (Taken 7/30/2024 0423)  Decreased wound size/increased tissue granulation at next dressing change:   Promote sleep for wound healing   Protective dressings over bony prominences  Goal: Participates in plan/prevention/treatment measures  Outcome: Progressing  Flowsheets (Taken 7/30/2024 0423)  Participates in plan/prevention/treatment measures: Elevate heels  Goal: Prevent/manage excess moisture  Outcome: Progressing  Flowsheets (Taken 7/30/2024 0423)  Prevent/manage excess moisture:   Cleanse incontinence/protect with barrier cream   Moisturize dry skin   Monitor for/manage infection if present  Goal: Prevent/minimize sheer/friction injuries  Outcome: Progressing  Flowsheets (Taken 7/30/2024 0423)  Prevent/minimize sheer/friction injuries:   Turn/reposition every 2 hours/use positioning/transfer devices   HOB 30 degrees or less   Complete micro-shifts as needed if patient unable. Adjust patient position to relieve pressure points, not a full turn   Use pull sheet  Goal: Promote/optimize nutrition  Outcome: Progressing  Flowsheets (Taken 7/30/2024 0423)  Promote/optimize nutrition: Monitor/record intake including meals  Goal: Promote skin healing  Outcome: Progressing  Flowsheets (Taken 7/30/2024 0423)  Promote skin healing:   Rotate device position/do not position patient on device   Protective dressings over bony prominences   Assess skin/pad under line(s)/device(s)   Turn/reposition every 2 hours/use positioning/transfer devices     Problem: Fall/Injury  Goal: Not fall by end of shift  Outcome: Met  Goal: Be free from injury by end of the shift  Outcome: Met      The clinical goals for the shift include Patient will tolerate POC this shift.

## 2024-07-30 NOTE — PROGRESS NOTES
07/30/24 1610   Discharge Planning   Living Arrangements Other (Comment)  (nursing home)   Support Systems Children   Assistance Needed ADL and ambulation   Type of Residence Skilled nursing facility   Home or Post Acute Services Post acute facilities (Rehab/SNF/etc)   Type of Post Acute Facility Services Skilled nursing   Expected Discharge Disposition SNF   Does the patient need discharge transport arranged? Yes   RoundTrip coordination needed? Yes   Financial Resource Strain   How hard is it for you to pay for the very basics like food, housing, medical care, and heating? Not very   Housing Stability   In the last 12 months, was there a time when you were not able to pay the mortgage or rent on time? N   In the past 12 months, how many times have you moved where you were living? 1   At any time in the past 12 months, were you homeless or living in a shelter (including now)? N   Transportation Needs   In the past 12 months, has lack of transportation kept you from medical appointments or from getting medications? no   In the past 12 months, has lack of transportation kept you from meetings, work, or from getting things needed for daily living? No     Spoke with daughter and would like patient to return to [facility] at discharge. Referral placed and a liaison will visit the patient tomorrow. Team plans of have a [facility] discussion with family. Will continue to follow for updates.

## 2024-07-30 NOTE — NURSING NOTE
Pt picked up by transport for transfer downtown. Attempted to call report to floor with no answer. Second attempt was made to call report but the nurse was unavailable. Was informed they would call back to receive report when available     0040 report called

## 2024-07-30 NOTE — CONSULTS
Wound Care Consult     Visit Date: 7/30/2024      Patient Name: Bing Holliday         MRN: [id number]           YOB: 1960     Reason for Consult: Multiple wounds, skin lesions        Wound History: Ms. Holliday is a 63 y.o. female who presented to [facility] July 15, 2024 for evaluation of weakness noted at skilled nursing facility. Diagnosed with septic shock, acute diverticulitis, acute diverticular bleed and admitted to the intensive care unit.      Pertinent Labs:   Albumin   Date Value Ref Range Status   07/30/2024 2.4 (L) 3.4 - 5.0 g/dL Final   04/29/2021 3.1 (L) 3.4 - 5.0 g/dL Final     Albumin, CSF   Date Value Ref Range Status   01/18/2024 25 0 - 35 mg/dL Final     Albumin Index   Date Value Ref Range Status   01/18/2024 10.7 (H) 0.0 - 9.0 ratio Final     Albumin, Serum   Date Value Ref Range Status   01/18/2024 2337 (L) 3500 - 5200 mg/dL Final       Wound Assessment:  Wound 07/19/24 Skin Tear Face Left (Active)   Wound Image   07/30/24 1444   Site Assessment Difficult Run 07/30/24 1209   Batsheva-Wound Assessment Hypopigmented 07/30/24 1209   Shape irregular 07/30/24 1209   Wound Length (cm) 1.5 cm 07/30/24 1209   Wound Width (cm) 0.5 cm 07/30/24 1209   Wound Surface Area (cm^2) 0.75 cm^2 07/30/24 1209   Wound Depth (cm) 0.1 cm 07/30/24 1209   Wound Volume (cm^3) 0.075 cm^3 07/30/24 1209   Drainage Description None 07/30/24 1209   Drainage Amount None 07/30/24 1209   Dressing Silicone border dressing 07/30/24 1209   Dressing Changed Changed 07/30/24 1209   Dressing Status Clean;Dry 07/30/24 1209       Wound 07/27/24 Other (comment) Buttocks Right (Active)   Wound Image   07/30/24 1452   Site Assessment Difficult Run 07/30/24 1204   Batsheva-Wound Assessment Intact 07/30/24 1204   Margins Well-defined edges 07/30/24 1204   Drainage Description None 07/30/24 1204   Drainage Amount None 07/30/24 1204   Dressing Silicone border dressing 07/30/24 1204   Dressing Changed Changed 07/30/24 1204   Dressing  Status Clean;Dry 07/30/24 1204       Wound 07/27/24 Other (comment) Foot Left;Lateral (Active)   Wound Image   07/30/24 1201   Site Assessment Dry 07/30/24 1201   Batsheva-Wound Assessment Dry 07/30/24 1201   Pressure Injury Stage O 07/30/24 0800   Margins Well-defined edges 07/30/24 1201   Drainage Description None 07/30/24 1201   Drainage Amount None 07/30/24 1201   Dressing ABD;Kerlix/rolled gauze 07/30/24 1201   Dressing Changed Reinforced 07/30/24 0800   Dressing Status Clean;Dry 07/30/24 0800       Wound 07/30/24 Back Lateral;Right (Active)   Wound Image   07/30/24 1500   Shape round 07/30/24 1500   Wound Length (cm) 0.2 cm 07/30/24 1500   Wound Width (cm) 0.2 cm 07/30/24 1500   Wound Surface Area (cm^2) 0.04 cm^2 07/30/24 1500   Wound Depth (cm) 0.1 cm 07/30/24 1500   Wound Volume (cm^3) 0.004 cm^3 07/30/24 1500   Drainage Description None 07/30/24 1500   Drainage Amount None 07/30/24 1500   Dressing Silicone border dressing 07/30/24 1500   Dressing Changed New 07/30/24 1203       Wound Team Summary Assessment: Patient lethargic throughout assessment. Bilateral feet with scars from prior surgery ( charcot ) 2 large calluses to the left lateral foot. Mavis has small red open lesions to her right upper lateral back and right buttocks. The one to her buttocks  is not located on a bony prominence. Not consistent with pressure. She has what appears to be a scratch to her left cheek.   Recommendations:  - Left cheek, clean with NSS, Cover with small Mepilex , change every other day.  -Right buttocks, Clean with cleansing cloths, cover with Mepilex and change daily.  - Right upper back, clean with NSS, or cleansing cloth and cover with Mepilex, change daily.  - Left lateral foot, Clean with cleansing cloth , Paint with Betadine, cover with ABD and wrap with kerlix. Change daily.  - While in bed patient should only be on one [facility] air mattress overlay (# [id number]), a fitted sheet, and one EHOB repositioning sheet ( Taps #  [id number]) with appropriate white chux. Please do not use brief while patient is resting in bed. TURN PT Q2 HOURS as far onto his side as tolerated. Place one wedge high from shoulders to waist, leave a gap for sacral/coccyx, and place second wedge below level of the wound. Apply EHOB Eron constantine boots ( #[id number])  to REINIER heels.    Wound Team Plan: Wound team will not continue to follow. Please re consult for worsening of wound.     SUZANNE GERHARDT, RN, BSN, CWOCN  7/30/2024  6:29 PM

## 2024-07-30 NOTE — NURSING NOTE
Patient arrived to unit via transport services from [place]. Raymond to room and staff. Patient verbalized understanding of call light system and appropriate use. Skin assessed and photos in chart. Montano in placed/intact, CHG completed. Patient reposition, left side lying. No need at this time. Pt resting comfortably, bed in lowest position and call light within reach.

## 2024-07-30 NOTE — H&P
"Chief complaint: \"Lupus Complication\"     HPI:  Bing Holliday is a 63 y.o. 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  Patient was transferred from [facility] to [facility] for rheumatologic evaluation. The patient initially presented to OSH on 7/15 from her skilled nursing facility due to AMS, hypotension, hypothermia, and hypoglycemic.  Medical workup in the ED at OSH was notable for pancytopenia (hemoglobin 8.4 and platelets 68) and CT AP at OSH was notable for diverticulitis of the hepatic flexure.  The patient was given IV fluids and Solu-Cortef due to concerns for adrenal insufficiency; however, her symptoms did not improve and she was subsequently transferred to the MICU for further management of presumed septic shock secondary to diverticulitis and adrenal insufficiency. Her OSH hospital course was complicated by lower GI bleed 2/2 diverticulitis epi taxis requiring 3 units pRBC and A-fib with RVR (HR 110s-130s).  The patient was transferred to [facility] for rheumatologic evaluation due to concerns of her presenting symptoms being secondary to her rheumatologic disease.  Upon evaluation, patient denied experiencing fever, chills, CP, SOB, lightheadedness, dizziness, bloody stools, or abdominal pain.      Relevant Labs:  CBC: WBC 7.4 , HGB 9.4,   BMP: , K 4.5, Cl 109, HCO3 29, BUN 39, CR 1.57, Glu 171  LFTS: AST 11 , ALT 11, ALKPHOS 117 , TBILI 0.4 , DBILI 0.1  TROP: 45  BNP: 157  COAGS: PT 11.7 , PTT 37  , INR 1.0  UA:     ABG:    CALCIUM 8.4 MAG No results found for requested labs within last 365 days. ALB 2.5 LACTATE 1.8 PHOS No results found for requested labs within last 365 days. COVIDNo " results found for requested labs within last 365 days.      Medications:  Scheduled medications  atorvastatin, 40 mg, oral, Nightly  budesonide, 0.5 mg, nebulization, BID  hydrocortisone, 10 mg, oral, q PM  hydrocortisone, 20 mg, oral, q AM  insulin lispro, 0-5 Units, subcutaneous, TID  levETIRAcetam, 500 mg, oral, BID  metoprolol tartrate, 25 mg, oral, BID  mycophenolate, 1,000 mg, oral, BID  pantoprazole, 40 mg, intravenous, Daily  polyethylene glycol, 17 g, oral, Daily      Continuous medications  heparin, 0-4,500 Units/hr, Last Rate: 1,800 Units/hr (07/30/24 0437)      PRN medications  PRN medications: dextrose, dextrose, glucagon, glucagon, heparin, ipratropium-albuteroL    Allergies:  Allergies   Allergen Reactions    Ace Inhibitors Shortness of breath, Swelling, Other and Angioedema     Facial droop    Hydroxychloroquine Other and Nausea/vomiting     Retinal Bleeding    Lisinopril Swelling    Sulfa (Sulfonamide Antibiotics) Hives       Past medical history:  See HPI  Past Medical History:   Diagnosis Date    Abnormal kidney function 04/04/2023    Acute headache 03/10/2024    Acute iritis of right eye 07/24/2015    Acute low back pain 10/30/2023    Acute upper respiratory infection, unspecified 03/04/2020    Acute URI    Acute upper respiratory infection, unspecified 09/30/2015    URTI (acute upper respiratory infection)    Arthralgia of right knee 02/14/2024    Arthritis     Atypical facial pain 03/10/2024    Body mass index (BMI) 23.0-23.9, adult 10/15/2021    BMI 23.0-23.9, adult    Body mass index (BMI) 33.0-33.9, adult 03/04/2020    BMI 33.0-33.9,adult    Cardiomegaly 08/27/2013    Left ventricular hypertrophy    Chest pain 06/10/2022    Comment on above: Added by Problem List Migration; 2013-3-12; Moved to Suppressed Nov 25 2013 9:16PM; Comment on above: Added by Problem List Migration; 2013-3-12; Moved to Suppressed Nov 25 2013 9:16PM;    Chronic kidney disease, stage 3 unspecified (Multi) 07/02/2013     Chronic kidney disease, stage III (moderate)    Confusional state 03/10/2024    Contact with and (suspected) exposure to covid-19 04/04/2023    Delirium 03/10/2024    Disease of pericardium, unspecified ([facility]) 07/02/2013    Pericardial disease    Encounter for follow-up examination after completed treatment for conditions other than malignant neoplasm 10/06/2022    Hospital discharge follow-up    Generalized contraction of visual field, right eye 01/29/2015    Generalized contraction of visual field of right eye    Hearing loss 03/10/2024    History of cataract 03/10/2024    History of thrombocytopenia 03/10/2024    Comment on above: Added by Problem List Migration; 2013-7-2;    Homonymous bilateral field defects, right side 04/29/2016    Homonymous bilateral field defects of right side    Hypertensive chronic kidney disease with stage 1 through stage 4 chronic kidney disease, or unspecified chronic kidney disease 07/02/2013    Nephrosclerosis    Laceration without foreign body, left foot, initial encounter 07/03/2018    Foot laceration, left, initial encounter    Localized edema 03/10/2024    Localized, primary osteoarthritis 02/14/2024    Mass of skin 03/10/2024    Migraine with aura, not intractable, without status migrainosus 10/24/2022    Ocular migraine    Open wound 03/10/2024    Open wound of left foot 03/10/2024    Other conditions influencing health status 07/02/2013    Chronic Glomerulonephritis In Diseases Classified Elsewhere    Other conditions influencing health status 07/02/2013    Progressive Familial Myoclonic Epilepsy    Other conditions influencing health status 07/02/2013    Protein S Deficiency    Other conditions influencing health status 05/22/2015    Familial Combined Hyperlipidemia    Other conditions influencing health status 10/24/2022    IDDM (insulin dependent diabetes mellitus)    Other conditions influencing health status 03/14/2022    Diabetes mellitus, insulin dependent (IDDM),  uncontrolled    Other long term (current) drug therapy 10/24/2022    Long-term use of Plaquenil    Overweight with body mass index (BMI) 25.0-29.9 03/10/2024    Pain of toe 03/10/2024    Personal history of COVID-19 04/04/2023    Personal history of diseases of the blood and blood-forming organs and certain disorders involving the immune mechanism 07/02/2013    History of thrombocytopenia    Personal history of diseases of the skin and subcutaneous tissue 08/11/2015    History of foot ulcer    Personal history of nephrotic syndrome 07/02/2013    History of nephrotic syndrome    Personal history of other diseases of the circulatory system 08/27/2013    History of sinus tachycardia    Personal history of other diseases of the nervous system and sense organs     History of cataract    Personal history of other diseases of the respiratory system     History of bronchitis    Personal history of other infectious and parasitic diseases 07/02/2013    History of hepatitis    Personal history of other specified conditions     History of shortness of breath    Personal history of other specified conditions 08/27/2013    History of edema    Posterior epistaxis 03/10/2024    Puckering of macula, right eye 10/24/2022    ERM OD (epiretinal membrane, right eye)    Raynaud's syndrome without gangrene 07/02/2013    Raynaud's disease    Sinusitis 03/10/2024    Systemic lupus erythematosus, unspecified (Multi) 07/24/2015    SLE (systemic lupus erythematosus)    Systemic lupus erythematosus, unspecified (Multi) 07/24/2015    SLE (systemic lupus erythematosus)    Systemic lupus erythematosus, unspecified (Multi) 07/24/2015    Systemic lupus    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) 07/02/2013    Type 2 diabetes with nephropathy    Type 2 diabetes mellitus with mild nonproliferative diabetic retinopathy without macular edema, left eye (Multi) 07/27/2015    Non-proliferative diabetic retinopathy, left eye    Type 2  diabetes mellitus with mild nonproliferative diabetic retinopathy without macular edema, unspecified eye (Multi) 07/24/2015    Mild non proliferative diabetic retinopathy    Type 2 diabetes mellitus with proliferative diabetic retinopathy without macular edema, right eye (Multi) 07/27/2015    Proliferative diabetic retinopathy of right eye    Type 2 diabetes mellitus with proliferative diabetic retinopathy without macular edema, unspecified eye (Multi) 07/24/2015    Diabetic proliferative retinopathy    Unspecified acute and subacute iridocyclitis 07/24/2015    Acute iritis, right eye    Unspecified open wound, left foot, sequela 07/03/2018    Wound, open, foot, left, sequela       Surgical history:  Past Surgical History:   Procedure Laterality Date    ANKLE SURGERY  01/29/2015    Ankle Surgery    CARDIAC ELECTROPHYSIOLOGY PROCEDURE Left 5/17/2024    Procedure: PPM IMPLANT DUAL;  Surgeon: Atnonio Rodriges MD;  Location: [facility];  Service: Electrophysiology;  Laterality: Left;  Pt. needs platelets and Prednisone prior to procedure    CHOLECYSTECTOMY  01/29/2015    Cholecystectomy    CT GUIDED PERCUTANEOUS BIOPSY BONE DEEP  5/4/2021    CT GUIDED PERCUTANEOUS BIOPSY BONE DEEP 5/4/2021 [facility] CLINICAL LEGACY    EYE SURGERY  03/06/2015    Eye Surgery    FOOT SURGERY  01/29/2015    Foot Surgery    MR HEAD ANGIO WO IV CONTRAST  7/26/2013    MR HEAD ANGIO WO IV CONTRAST 7/26/2013 [facility] CLINICAL LEGACY    MR HEAD ANGIO WO IV CONTRAST  9/17/2021    MR HEAD ANGIO WO IV CONTRAST 9/17/2021 [facility]    MR HEAD ANGIO WO IV CONTRAST  3/25/2023    MR HEAD ANGIO WO IV CONTRAST [facility]    MR NECK ANGIO WO IV CONTRAST  7/26/2013    MR NECK ANGIO WO IV CONTRAST 7/26/2013 [facility] CLINICAL LEGACY    MR NECK ANGIO WO IV CONTRAST  9/17/2021    MR NECK ANGIO WO IV CONTRAST 9/17/2021 [facility]    MR NECK ANGIO WO IV CONTRAST  3/25/2023    MR NECK ANGIO WO IV CONTRAST [facility]    OTHER SURGICAL HISTORY  01/29/2015     Creation Of Pericardial Window    OTHER SURGICAL HISTORY  01/29/2015    Quadricepsplasty    TOTAL HIP ARTHROPLASTY  01/29/2015    Hip Replacement       Family history:  No known medical illnesses  Family History   Problem Relation Name Age of Onset    Other (RENAL DISEASE) Mother          END STAGE    Other (CARDIAC DISORDER) Mother      Cataracts Mother      Stroke Mother      Diabetes Mother      Kidney disease Mother      Lupus Mother      Other (CARDIAC DISORDER) Father      COPD Father      Glaucoma Father      Hypertension Father      Sleep apnea Father      Other (CARDIAC DISORDER) Sister      Depression Sister      Kidney disease Sister      Sickle cell trait Sister      Sleep apnea Daughter         Social history:   reports that she has never smoked. She has never been exposed to tobacco smoke. She has never used smokeless tobacco. She reports that she does not currently use alcohol. She reports that she does not use drugs.  Tobacco use: Pt denies current or previous tobacco use   EtOH: Pt denies   Illicit: Pt denies current or previous illicit substance use     Review of systems:  A 12 point ROS was significant for findings mentioned in the HPI.    Vitals:  Vitals:    07/30/24 0359   BP: 100/70   Pulse: 93   Resp: 22   Temp: 36.4 °C (97.5 °F)   SpO2:        Physical exam:  Constitutional: Well-developed female in no acute distress.  HEENT: Normocephalic, atraumatic. PERRL. EOMI. No cervical lymphadenopathy.  Respiratory: CTA bilaterally. No wheezes, rales, or rhonchi. Normal respiratory effort.  Cardiovascular: RRR. No murmurs, gallops, or rubs.   Abdominal: Soft, nondistended, nontender to palpation.   Neuro: CN grossly intact, extremities moving symmetrically   MSK: No LE edema bilaterally.  Skin: Warm, dry. No rashes or wounds.  Psych: Appropriate mood and affect.    Labs:  Results for orders placed or performed during the hospital encounter of 07/30/24 (from the past 24 hour(s))   CBC and Auto  Differential   Result Value Ref Range    WBC 7.4 4.4 - 11.3 x10*3/uL    nRBC 1.5 (H) 0.0 - 0.0 /100 WBCs    RBC 3.27 (L) 4.00 - 5.20 x10*6/uL    Hemoglobin 9.4 (L) 12.0 - 16.0 g/dL    Hematocrit 32.2 (L) 36.0 - 46.0 %    MCV 99 80 - 100 fL    MCH 28.7 26.0 - 34.0 pg    MCHC 29.2 (L) 32.0 - 36.0 g/dL    RDW 23.1 (H) 11.5 - 14.5 %    Platelets 113 (L) 150 - 450 x10*3/uL    Neutrophils % 47.5 40.0 - 80.0 %    Immature Granulocytes %, Automated 0.7 0.0 - 0.9 %    Lymphocytes % 37.4 13.0 - 44.0 %    Monocytes % 13.7 2.0 - 10.0 %    Eosinophils % 0.3 0.0 - 6.0 %    Basophils % 0.4 0.0 - 2.0 %    Neutrophils Absolute 3.49 1.20 - 7.70 x10*3/uL    Immature Granulocytes Absolute, Automated 0.05 0.00 - 0.70 x10*3/uL    Lymphocytes Absolute 2.75 1.20 - 4.80 x10*3/uL    Monocytes Absolute 1.01 (H) 0.10 - 1.00 x10*3/uL    Eosinophils Absolute 0.02 0.00 - 0.70 x10*3/uL    Basophils Absolute 0.03 0.00 - 0.10 x10*3/uL   Comprehensive metabolic panel   Result Value Ref Range    Glucose 171 (H) 74 - 99 mg/dL    Sodium 147 (H) 136 - 145 mmol/L    Potassium 4.5 3.5 - 5.3 mmol/L    Chloride 109 (H) 98 - 107 mmol/L    Bicarbonate 29 21 - 32 mmol/L    Anion Gap 14 10 - 20 mmol/L    Urea Nitrogen 39 (H) 6 - 23 mg/dL    Creatinine 1.57 (H) 0.50 - 1.05 mg/dL    eGFR 37 (L) >60 mL/min/1.73m*2    Calcium 8.4 (L) 8.6 - 10.6 mg/dL    Albumin 2.5 (L) 3.4 - 5.0 g/dL    Alkaline Phosphatase 117 33 - 136 U/L    Total Protein 5.0 (L) 6.4 - 8.2 g/dL    AST 11 9 - 39 U/L    Bilirubin, Total 0.4 0.0 - 1.2 mg/dL    ALT 11 7 - 45 U/L   Phosphorus   Result Value Ref Range    Phosphorus 4.0 2.5 - 4.9 mg/dL   Morphology   Result Value Ref Range    RBC Morphology See Below     [place] Cells Few      *Note: Due to a large number of results and/or encounters for the requested time period, some results have not been displayed. A complete set of results can be found in Results Review.       Imaging:  ECG 12 Lead    Result Date: 7/27/2024  Atrial fibrillation with  rapid ventricular response Moderate voltage criteria for LVH, may be normal variant ( R in aVL , Tyler product ) ST & T wave abnormality, consider inferior ischemia Abnormal ECG When compared with ECG of 26-JUL-2024 05:52, (unconfirmed) T wave inversion no longer evident in Anterior leads Confirmed by Evaristo Moran ([id number]) on 7/27/2024 8:05:23 AM    XR chest 1 view    Result Date: 7/17/2024  Interpreted By:  Dhaval Hensley, STUDY: XR CHEST 1 VIEW; 7/17/2024 10:59 am   INDICATION: Signs/Symptoms:increasing oxygen requirement.   COMPARISON: 07/15/2024   ACCESSION NUMBER(S): [id number]   ORDERING CLINICIAN: TEETEE MOONEY   TECHNIQUE: 1 view of the chest was performed.   FINDINGS: The lungs are adequately inflated. No acute consolidation, however, there is suggestion of patchy bilateral ground-glass opacity as well mild prominence of the interstitium which is nonspecific but can be seen with mild pulmonary edema as well as inflammatory or potentially atypical infectious etiologies. Possible minimal-small pleural effusions, limited in evaluation on this single portable view. No pneumothorax.  The cardiomediastinal silhouette is mildly enlarged.       Mild patchy bilateral ground-glass opacities as well as mild prominence of the interstitium. Possible minimal-small pleural effusions. Mild cardiomegaly. Findings are concerning for CHF and mild pulmonary edema however inflammatory or atypical infectious etiologies could have this appearance.   Signed by: Dhaval Hensley 7/17/2024 11:38 AM Dictation workstation:   [id number]    XR abdomen 1 view    Result Date: 7/16/2024  Interpreted By:  Katrina Brice, STUDY: XR ABDOMEN 1 VIEW;  7/16/2024 10:00 pm   INDICATION: Signs/Symptoms:ng placement.   COMPARISON: CT 07/15/2024   ACCESSION NUMBER(S): [id number]   ORDERING CLINICIAN: JAMAL LEBRON   FINDINGS: NG tube noted with tip overlying the gastric antrum region. Nonobstructive bowel gas pattern. Limited evaluation  of pneumoperitoneum on supine imaging, however no gross evidence of free air is noted.   Visualized lungs are clear.   Osseous structures demonstrate no acute bony changes.   Severe left hip joint degenerative changes with joint space loss. Severe degenerative changes of the lower lumbar spine.       1. Nonobstructive bowel gas pattern 2. NG tube tip overlying the gastric antrum region.   MACRO: None   Signed by: Katrina Brice 7/16/2024 10:11 PM Dictation workstation:   [id number]    XR chest 1 view    Result Date: 7/15/2024  Interpreted By:  Mitesh Khan, STUDY: XR CHEST 1 VIEW;  7/15/2024 5:51 pm   INDICATION: Signs/Symptoms:line placement.   COMPARISON: 07/15/2024   ACCESSION NUMBER(S): [id number]   ORDERING CLINICIAN: TEETEE MOONEY   FINDINGS: Left-sided pacemaker in place.     CARDIOMEDIASTINAL SILHOUETTE: There is enlarged of the cardiac silhouette with vascular congestion. No donn edema seen.   LUNGS: There is no consolidation. There is no effusion.   ABDOMEN: No remarkable upper abdominal findings.   BONES: No acute osseous changes.       1.  Enlarged cardiac silhouette with pulmonary vascular congestion.       MACRO: None   Signed by: Mitesh Khan 7/15/2024 5:55 PM Dictation workstation:   [id number]    CT chest abdomen pelvis wo IV contrast    Addendum Date: 7/15/2024    NOTIFICATION:  The positive results of the study were discussed with, and acknowledged by AMINAH Hill, by telephone on 7/15/2024 at 1612 hours. Signed by Cheko Wise MD    Result Date: 7/15/2024  STUDY: CT Chest, Abdomen, and Pelvis without IV Contrast; 7/15/2024, 13:30 INDICATION: Sepsis, hypotension, hypoglycemia, hypothermia. COMPARISON: CT chest abd 6/28/2024, 6/9/2024, 3/20/2024, 1/14/2024. ACCESSION NUMBER(S): [id number] ORDERING CLINICIAN: ANTONIA ANGLIN TECHNIQUE: CT of the chest, abdomen, and pelvis was performed.  Contiguous axial images were obtained at 3 mm slice thickness through the chest, abdomen, and  pelvis.  Coronal and sagittal reconstructions at 3 mm slice thickness were performed.  No intravenous contrast was administered.  FINDINGS: Please note that the evaluation of vessels, lymph nodes and organs is limited without intravenous contrast. CHEST: MEDIASTINUM: Heart is enlarged.  Pacemaker is in place on the left..  Extensive coronary artery calcifications are identified.  LUNGS/PLEURA: There is a new small right pleural effusion..  The airways are patent. There is a faint 9 mm nodule in the right middle lobe, not present on the most recent study.  The nodules that were noted at the right base on the prior study have nearly completely resolved.  There is a 7 mm nodule at the left lung base which was not seen on the prior study. LYMPH NODES: Thoracic lymph nodes are not enlarged. ABDOMEN:  LIVER: No hepatomegaly.  Smooth surface contour.  Normal attenuation.  There is a small amount of ascites around the edge of the liver, not seen on the prior study.  BILE DUCTS: No intrahepatic or extrahepatic biliary ductal dilatation.  GALLBLADDER: The gallbladder is absent. STOMACH: No abnormalities identified.  PANCREAS: No masses or ductal dilatation.  SPLEEN: No splenomegaly or focal splenic lesion.  ADRENAL GLANDS: No thickening or nodules.  KIDNEYS AND URETERS: Kidneys are normal in size and location.  There is a punctate nonobstructing calculus in the right kidney as seen previously.  PELVIS:  BLADDER: Not well evaluated due to artifact from hip prosthesis.  There is a Montano catheter in place.  REPRODUCTIVE ORGANS: Not seen  BOWEL: There is an area of acute inflammation adjacent to the hepatic flexure of the colon close to several diverticula.  No defined abscess is seen.  This was not present on the prior study.  No appendiceal enlargement or inflammation is seen.  There is diverticulosis of the sigmoid without inflammation.  VESSELS: No abnormalities identified.  Abdominal aorta is normal in caliber.   PERITONEUM/RETROPERITONEUM/LYMPH NODES: There is small to moderate fluid in the cul-de-sac.  No pneumoperitoneum. No lymphadenopathy.  ABDOMINAL WALL: No abnormalities identified. SOFT TISSUES: There is mild edema in the subcutaneous tissues in the lower abdomen and thighs.  BONES: Extensive degenerative changes throughout the lumbar spine, stable. Stable compression fracture of T7.    In comparison to the recent study, a small right effusion has developed in addition to a small amount of ascites. Findings are suspicious for diverticulitis at the hepatic flexure.  No abscess or obvious perforation demonstrated. The nodularity seen at the right base on the prior study has almost completely resolved.  New small nodules are seen in the right middle lobe and left lower lobe.  These are most likely inflammatory given the relatively new appearance of these and the disappearance of other nodules.  Continued follow-up is recommended.. Signed by Cheko Wise MD    XR chest 1 view    Result Date: 7/15/2024  Interpreted By:  Roni Dwyer, STUDY: XR CHEST 1 VIEW;  7/15/2024 3:43 pm   INDICATION: Signs/Symptoms:new lij cvc, confirm placement and evaluate for ptx.   COMPARISON: Chest x-ray earlier today   ACCESSION NUMBER(S): [id number]   ORDERING CLINICIAN: TEETEE MOONEY   FINDINGS: Left IJ central line tip about 3 cm below the atrial caval junction. Pacemaker still present. The lungs appear clear. No visualized pleural effusion or pneumothorax. Suspect cardiomegaly. Mild pulmonary vascular congestion. Aortic atherosclerosis.       The tip of left IJ central line is in the right atrium. No apparent complication identified post central line placement otherwise.   MACRO: None   Signed by: Roni Dwyer 7/15/2024 3:46 PM Dictation workstation:   [id number]    XR chest 1 view    Result Date: 7/15/2024  Interpreted By:  Jerry Santacruz, STUDY: XR CHEST 1 VIEW;  7/15/2024 11:14 am   INDICATION: Signs/Symptoms:Chest Pain.   COMPARISON:  06/28/2024   ACCESSION NUMBER(S): [id number]   ORDERING CLINICIAN: ANTONIA ANGLIN   FINDINGS: CHEST/LUNGS: The cardiac and mediastinal silhouettes are unchanged in size and configuration. Mild coarsening of the interstitial markings is unchanged. Inspiratory volume is suboptimal which results in crowding of the bronchovascular structures. Atelectasis at the lung bases. No definite new infiltrates. Blunting of the costophrenic angles may relate to trace effusions or pleural thickening.   UPPER ABDOMEN: No remarkable upper abdominal findings.   OSSEOUS STRUCTURES: No acute changes.       Trace effusions versus pleural thickening. No focal consolidation.   MACRO: None.   Signed by: Jerry Santacruz 7/15/2024 11:24 AM Dictation workstation:   [id number]      Assessment and plan:  63 y.o. 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  Patient was transferred from [facility] to [facility] for rheumatologic evaluation. The patient initially presented to OSH on 7/15 from her skilled nursing facility due to AMS, hypotension, hypothermia, and hypoglycemic. CT AP at OSH was notable for diverticulitis of the hepatic flexure.  The patient was transferred to the OSH MICU for further management of presumed septic shock secondary to diverticulitis and adrenal insufficiency. Her OSH hospital course was complicated by lower GI bleed 2/2 diverticulitis and epistaxis requiring 3 units pRBC and A-fib with RVR (HR 110s-130s).  The patient was transferred to [facility] for rheumatologic evaluation due to concerns of her presenting symptoms being secondary to her rheumatologic disease.  Upon arrival, the patient was HDS; however, remained in A-fib.  " Patient also appeared to be drowsy on physical exam but oriented x 3.  Low concern for infection as patient is afebrile and labs are unremarkable for leukocytosis.  Rheumatology will be consulted in the morning.         #Encephalopathy   - Patient oriented x 3; however appeared drowsy on physical exam.  Patient has had significant steroid exposure which could contribute to her mild confusion.  - CT head on 7/8 negative for acute intracranial abnormalities  - Will obtain labs to evaluate for electrolyte disturbances or metabolic derangements contributing to the patient's current mental status.  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  - patient's rheumatologist, Dr. Hoyos, in [place] on 7/18  - home meds: mycophenalte 1000 mg BID     Plan:  - c/w home med  - consult rheumatology for further eval     #Recurrent adrenal insufficiency   - had >6 recurrent episodes of adrenal insuffiencey within the past year  - Pt was admitted to the MICU at OSH with encephalopathy and sepsis in the setting of diverticulitis and concern for adrenal insuffiencey where she was started on IV steroids.   - Pt was seen by endocrinology at OSH and transitioned to oral steroids on 7/29: hydrocortisone 20 mg QAM and 10 mg QPM   - Concerns of \"pill pocketing\" was reported by OSH endocrinologist.      Plan:  - c/w current regimen  - daily labs   - endocrine consult prior to dc to finalize home hydrocortisone regimen     #Afib with RVR   #HFmREF (EF 40-45% 5/2024)  #CAD s/p CABG 2013  - [id number] Score 4  - ECG on arrival notable for A-fib, heart rate 104  - not on Eliquis due to thrombocytopenia  - therapeutic anticoagulation was held at OSH due to hematochezia and epistasis which have since resolved.  - Heart rates in the 110-130s at " OSH, patient was started on metoprolol tartrate 25 mg BID daily     Plan:  - Starting therapeutic heparin due to patient being high risk for thrombotic event, patient has not had recurrent bleeds, hemoglobin has been stable, and thrombocytopenia has since improved.    - Continue with metoprolol tartrate 25 mg twice daily  - continuous tele     #Diverticulitis   #Hematochezia, resolved  - CT AP at OSH was notable for diverticulitis of the hepatic flexure. GI was consulted due to diverticulitis who recommended continuing antibiotics, outpatient colonoscopy in 6 weeks and CTA to identify source of active bleed.  CTA was deferred due to patient severely reduced GFR.  - OSH course complicated by diverticular bleed, patient received 3 units PRBC during this time.   - Completed course of Zosyn (7/17 - 7/22) at OSH     Plan:  - CTM BMs  - outpatient colonoscopy  - daily CBC     #Hypernatremia   - likely secondary to poor PO intake due to AMS   - Na 147 on admission, Stable since 7/27  - Free water deficit 0.8 L    Plan:  - Trend RFPs  - Recheck Na after 500 ml bolus, if pt remains hypernatremic consider fluid replacement with 0.45 NS.      #HTN   #HLD  #Hypotensive  - Patient /72 on arrival, hypotension likely secondary to hypovolemia due to patient poor p.o. intake.  - home regimen: Atorvastatin 40 mg daily, furosemide 40 mg daily,      Plan  - c/w home meds, except home lasix 40 mg daily due to JED and hypotension  -500 cc bolus of LR     #CKD3b   # Lupus nephritis  - Cr 1.59, (bl Cr 1.5-1.9)  - OSH course complicated by JED on CKD secondary to ATN in the setting of septic shock and acute GI bleed.  Creatinine was 2.6 during this time     Plan:  - Renally dose meds  - Avoid nephrotoxic agents  - Hold home lasix 40 mg daily   - Consider consulting nephrology for evaluation and management of lupus nephritis     #Thrombocytopenia, improving   #Chronic Anemia  - Hb 9.4, baseline 8-10, Hb stable at ~9 since 7/27  - OSH  platelet count of 68 on admission, during this time her MMF was held which has since been resumed.  Platelet 113 on admission.  - OSH course complicated by pancytopenia, diverticular bleed and epistasis, patient received 3 units PRBC during this time.     #DM Type 2  - last A1c 6.8 on 6/2024  - home regimen: lantus 10 units QAM, SSI     Plan:  - hold home meds  - SSI     #COPD  - Saturating well on room air  - home inhalers: Trelegy-Ellipta, albuterol-budesonide     Plan:  - c/w home inhalers  - prn DuoNeb     # History of seizures  Plan:  -Continue home Keppra 500 mg twice daily     # GERD  Plan:  - Continue home pantoprazole 40 mg daily     F: IVF prn   E: Lytes prn  N: NPO due to patient being confused and drowsy on arrival  A: PIV     DVT ppx: Therapeutic heparin ggt  GI ppx: IV PPI    Code Status: DNR/DNI, no dialysis.  Patient unable to verify CODE STATUS.  Attempted to call son who is POA with no answer.  Please reconfirm patient CODE STATUS  Surrogate Medical Decision-maker: Sandra Holliday, daughter, [phone]     Nadiya Hernandez MD   PGY-2 Internal Medicine

## 2024-07-30 NOTE — CARE PLAN
The patient's goals for the shift include      The clinical goals for the shift include Patient will remain free from falls/injuries during this shift.      Problem: Pain - Adult  Goal: Verbalizes/displays adequate comfort level or baseline comfort level  Outcome: Progressing     Problem: Safety - Adult  Goal: Free from fall injury  Outcome: Progressing     Problem: Discharge Planning  Goal: Discharge to home or other facility with appropriate resources  Outcome: Progressing     Problem: Chronic Conditions and Co-morbidities  Goal: Patient's chronic conditions and co-morbidity symptoms are monitored and maintained or improved  Outcome: Progressing     Problem: Skin  Goal: Decreased wound size/increased tissue granulation at next dressing change  Outcome: Progressing  Flowsheets (Taken 7/30/2024 0907)  Decreased wound size/increased tissue granulation at next dressing change:   Promote sleep for wound healing   Protective dressings over bony prominences  Goal: Participates in plan/prevention/treatment measures  Outcome: Progressing  Flowsheets (Taken 7/30/2024 0907)  Participates in plan/prevention/treatment measures: Elevate heels  Goal: Prevent/manage excess moisture  Outcome: Progressing  Flowsheets (Taken 7/30/2024 0907)  Prevent/manage excess moisture:   Cleanse incontinence/protect with barrier cream   Moisturize dry skin   Monitor for/manage infection if present  Goal: Prevent/minimize sheer/friction injuries  Outcome: Progressing  Flowsheets (Taken 7/30/2024 0907)  Prevent/minimize sheer/friction injuries:   HOB 30 degrees or less   Turn/reposition every 2 hours/use positioning/transfer devices   Use pull sheet  Goal: Promote/optimize nutrition  Outcome: Progressing  Flowsheets (Taken 7/30/2024 0907)  Promote/optimize nutrition:   Monitor/record intake including meals   Discuss with provider if NPO > 2 days  Goal: Promote skin healing  Outcome: Progressing  Flowsheets (Taken 7/30/2024 0907)  Promote skin  healing:   Assess skin/pad under line(s)/device(s)   Ensure correct size (line/device) and apply per  instructions   Protective dressings over bony prominences   Turn/reposition every 2 hours/use positioning/transfer devices     Problem: Fall/Injury  Goal: Verbalize understanding of personal risk factors for fall in the hospital  Outcome: Progressing  Goal: Verbalize understanding of risk factor reduction measures to prevent injury from fall in the home  Outcome: Progressing  Goal: Use assistive devices by end of the shift  Outcome: Progressing  Goal: Pace activities to prevent fatigue by end of the shift  Outcome: Progressing     Problem: Diabetes  Goal: Achieve decreasing blood glucose levels by end of shift  Outcome: Progressing  Goal: Increase stability of blood glucose readings by end of shift  Outcome: Progressing  Goal: Maintain electrolyte levels within acceptable range throughout shift  Outcome: Progressing  Goal: Maintain glucose levels >70mg/dl to <250mg/dl throughout shift  Outcome: Progressing  Goal: No changes in neurological exam by end of shift  Outcome: Progressing  Goal: Learn about and adhere to nutrition recommendations by end of shift  Outcome: Progressing  Goal: Vital signs within normal range for age by end of shift  Outcome: Progressing

## 2024-07-30 NOTE — HOSPITAL COURSE
63 y.o. 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  Patient was transferred from [facility] to [facility] for rheumatologic evaluation. The patient initially presented to OSH on 7/15 from her SNF 2/2 AMS, hypotension, hypothermia, and hypoglycemic, concerning for presumed septic shock secondary to diverticulitis and adrenal insufficiency. OSH hospital course complicated by lower GI bleed 2/2 diverticulitis and epistaxis requiring 3 units pRBC and A-fib with RVR (HR 110s-130s). Eliquis was stopped at OSH 2/2 bleeding and thrombocytopenia in shock state. Patient was transferred to [facility] for rheumatologic evaluation due to concerns of her presenting symptoms being secondary to her rheumatologic disease.     Workup for SLE flare (MONTSERRAT, C3/C4, dsDNA) at OSH negative, and no LP or MRI performed. On exam, patient had normal cognitive function with waxing and waning delirium noted to be at baseline in acute period following adrenal insufficiency per son. No concern for SLE cerebritis, rheumatology consult held in light of this. Delirium likely multifactorial. Eliquis resumed (7/30). Patient continued to be in Afib with RVR throughout admission, but is stable. Eliquis stopped at OSH and not restarted given prolonged course of intermittent starting and stopping of Eliquis over past 6 months 2/2 bleeding. Risks do not outweigh benefits of keeping Eliquis. ECG notable for Afib with RVR and lateral T wave inversions (V4-V6) (7/30), sometimes notable on prior ECGs as well. RFP on admission notable for hypernatremia (Na 147) refractory to LR bolus on repeat RFP, but resolved with D5W gtt. Palliative care consulted, and  confirmed good outpatient plan for patient at SNF with [facility], home palliative care program.     To Do:  Follow palliative care recommendations  Follow up with cardiology referral outpatient  Follow up with rheumatologist outpatient  Outpatient colonoscopy needed for evaluation of diverticulitis

## 2024-07-30 NOTE — SIGNIFICANT EVENT
"Brief Summary:  63 y.o. 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  Patient was transferred from [facility] to [facility] for rheumatologic evaluation. The patient initially presented to OSH on 7/15 from her SNF 2/2 AMS, hypotension, hypothermia, and hypoglycemic, concerning for presumed septic shock secondary to diverticulitis and adrenal insufficiency. OSH hospital course complicated by lower GI bleed 2/2 diverticulitis and epistaxis requiring 3 units pRBC and A-fib with RVR (HR 110s-130s).  Patient was transferred to [facility] for rheumatologic evaluation due to concerns of her presenting symptoms being secondary to her rheumatologic disease.      Vital signs  /70   Pulse 86   Temp 36.1 °C (97 °F)   Resp 15   Ht 1.61 m (5' 3.39\")   Wt 97.9 kg (215 lb 13.3 oz)   LMP  (LMP Unknown)   SpO2 94%   BMI 37.77 kg/m²   Temp  Min: 35.8 °C (96.4 °F)  Max: 36.4 °C (97.5 °F)  Pulse  Min: 82  Max: 110  BP  Min: 100/70  Max: 130/87  Resp  Min: 15  Max: 22  SpO2  Min: 93 %  Max: 99 %    Physical Exam  Constitutional:       General: She is not in acute distress.     Appearance: Normal appearance.   HENT:      Head: Normocephalic and atraumatic.      Right Ear: External ear normal.      Left Ear: External ear normal.      Nose: Nose normal.      Mouth/Throat:      Mouth: Mucous membranes are moist.   Eyes:      Extraocular Movements: Extraocular movements intact.      Pupils: Pupils are equal, round, and reactive to light.   Cardiovascular:      Rate and Rhythm: Tachycardia present. Rhythm irregular.      Pulses: Normal pulses.      Heart sounds: Normal heart sounds. No murmur heard.     No friction rub. No gallop. "      Comments: Atrial Fibrillation at baseline  Pulmonary:      Effort: Pulmonary effort is normal. No respiratory distress.      Breath sounds: Normal breath sounds. No wheezing.   Abdominal:      General: Abdomen is flat.      Palpations: Abdomen is soft.      Tenderness: There is no abdominal tenderness.   Musculoskeletal:         General: Normal range of motion.   Skin:     General: Skin is warm and dry.      Capillary Refill: Capillary refill takes less than 2 seconds.   Neurological:      General: No focal deficit present.      Mental Status: She is alert and oriented to person, place, and time. Mental status is at baseline.   Psychiatric:         Mood and Affect: Mood normal.         Behavior: Behavior normal.         Thought Content: Thought content normal.         Relevant Updates Since Rounds:  - ECG: notable for Afib with RVR and lateral T wave inversions in leads V4-V6  - Swallow Study: patient passed swallow eval, speech recommended Regular Solids & Thin Liquids. Pills may be crushed with apple sauce/pudding  - Rheumatologic workup at OSH resolved without abnormalities noting acute flare. Patient mentating at baseline and Aox3 on exam this AM. No need to consult rheum at this time.    Spoke to son, López, who is HCPOA, and updated him on patient's status in the hospital. He notes that at her baseline, she is cognitively intact and not delirious. However, she does become delirious whenever she has a bout of adrenal insufficiency which has been a recurrent problem for her, most recently 2-3 weeks prior to this hospitalization for the same problem. He also noted that she had a bout of lupus psychosis approximately 1 year ago. He confirms that she has had Atrial Fibrillation for the past 2-3 years, and takes Eliquis at home. We revisited the conversation regarding consulting palliative/hospice care which was started at OSH and he requested that we reach out to palliative for recommendations. In the past,  patient has been adamant against hospice.    Code Status: DNR/DNI with consent for invasive lines

## 2024-07-30 NOTE — PROGRESS NOTES
Speech-Language Pathology  Adult Inpatient Clinical Bedside Swallow Evaluation    Patient Name: Bing Holliday  MRN: [id number]  Today's Date: 7/30/2024   Start Time: 1520  Stop Time: 1545  Time Calculation (min): 25    History of Present Illness:   Bing Holliday is a 63 y.o. 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  [place] [facility] [place] [facility] for rheumatologic evaluation. The patient initially presented to OSH on 7/15 from her skilled nursing facility due to AMS, hypotension, hypothermia, and hypoglycemic.  Medical workup in the ED at OSH was notable for pancytopenia (hemoglobin 8.4 and platelets 68) and CT AP at OSH was notable for diverticulitis of the hepatic flexure.  The patient was given IV fluids and Solu-Cortef due to concerns for adrenal insufficiency; however, her symptoms did not improve and she was subsequently transferred to the MICU for further management of presumed septic shock secondary to diverticulitis and adrenal insufficiency. Her OSH hospital course was complicated by lower GI bleed 2/2 diverticulitis epi taxis requiring 3 units pRBC and A-fib with RVR (HR 110s-130s).  The patient was transferred to [facility] for rheumatologic evaluation due to concerns of her presenting symptoms being secondary to her rheumatologic disease.  Upon evaluation, patient denied experiencing fever, chills, CP, SOB, lightheadedness, dizziness, bloody stools, or abdominal pain.     Assessment:   Clinical bedside swallow evaluation completed. Pt cleared for evaluation by RN who reports pt pocketing oral medication this AM. Awake/alert and upright in bed for session. A&Ox4. Unremarkable  oral mechanism examination. Cooperative/consistently able to follow commands throughout session. Pt initially w/ weak/breathy vocal quality; improved when prompted to project. Weak volitional cough prior to administration of PO trials.     Trials of ice chips x2, tsp sips water x2, single/consecutive sips water via straw, 3 oz protocol via straw, purees, and regular solids were given. Normal oral management of all trials/consistencies; consistent complete oral clearance w/ regular solids. No overt clinical s/s aspiration w/ 3 oz protocol or any other trials; no throat clearing/coughing/choking or changes in respiratory status/vocal quality.     Recommend initiation of Regular Solids & Thin Liquids. Ensure safe swallowing guidelines (listed below) are followed during all oral intake. Medication administration via water vs crushed in purees. No further SLP services indicated; will sign off/complete order. If pt presents with any changes to mental, medical, or respiratory status, please make NPO and alert SLP. RN/MD aware.      Recommendations:  Regular Solids & Thin Liquids  Upright for all PO intake  Remain upright for 20-30 min after eating  Small bites/sips  Straws ok  Slow rate of consumption  Medication crushed in purees vs w/ water/thin liquids  SLP to sign off  If pt presents with any changes to mental, medical, or respiratory status, please make NPO and alert SLP    Goal:   Pt will tolerate least restrictive diet with no overt clinical s/s aspiration 100% of the time.   Start Date: 7/30/24  Expected Time Frame to Meet Goal: Goal Met       Plan:  SLP Services Indicated: No  Frequency: Eval only  Discussed POC with patient  SLP - OK to Discharge    Pain:   0-10  0 = No pain.     Inpatient Education:  Extensive education provided to patient regarding current swallow function, recommendations/results, and POC.      Consultations/Referrals/Coordination of Services:   N/A

## 2024-07-30 NOTE — PROGRESS NOTES
Physical Therapy    Physical Therapy Evaluation    Patient Name: Bing Holliday  MRN: [id number]  Today's Date: 7/30/2024   Time Calculation  Start Time: 1342  Stop Time: 1358  Time Calculation (min): 16 min  6016/6016-B    Assessment/Plan   PT Assessment  PT Assessment Results: Decreased strength, Decreased endurance, Impaired balance, Decreased mobility, Decreased cognition  Rehab Prognosis: Fair  Evaluation/Treatment Tolerance: Patient limited by fatigue  Medical Staff Made Aware: Yes  End of Session Communication: Bedside nurse  Assessment Comment: Pt. is a 64 y/o F transferred from [facility] for rhumatology consult. Pt. presents with confusion, weakness, impaired balance, decreased endurance, and difficulty with all functional mobility. Pt. would benefit from skilled PT while IP to address these deficits,  End of Session Patient Position: Bed, 3 rail up, Alarm off, not on at start of session  IP OR SWING BED PT PLAN  Inpatient or Swing Bed: Inpatient  PT Plan  Treatment/Interventions: Bed mobility, Transfer training, Gait training, Balance training, Neuromuscular re-education, Strengthening, Endurance training, Therapeutic exercise, Therapeutic activity, Home exercise program  PT Plan: Ongoing PT  PT Frequency: 2 times per week  PT Discharge Recommendations: Moderate intensity level of continued care  PT Recommended Transfer Status: Total assist  PT - OK to Discharge: Yes (Pt evaluation complete and rehab recommendations made)    Subjective       General Visit Information:  General  Reason for Referral: initially presented due to AMS. Patient was found to be in shock requiring pressors due to either sepsis or adrenal insufficiency and was admitted to the ICU. C/f GIB. Transferred to [facility] due to concerns regarding underlying rheumatologic etiology causing symptoms.  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 Flat affect. > 75% command follow with increased time,    Home Living:  Home Living  Home Living Comments: Pt admitted from SNF. Pt. reports at facility, was getting OOB to WC, working with therapy, and ambulating with RW. Prior to SNF, was living in house with son, 3STE. bed and bath on 1st floor, ambulating with RW    Prior Level of Function:  Prior Function Per Pt/Caregiver Report  Level of [place]: Needs assistance with ADLs (at SNF prior to admission)  ADL Assistance: Needs assistance  Homemaking Assistance: Needs assistance  Ambulatory Assistance: Needs assistance (per pt- ambulating short distances with RW at SNF prior to admission. questionable historian.)    Precautions:  Precautions  Medical Precautions: Fall precautions    Vital Signs:  Vital Signs  Heart Rate: 86  Objective     Pain:  Pain Assessment  Pain Assessment: 0-10  0-10 (Numeric) Pain Score: 0 - No pain    Cognition:  Cognition  Orientation Level:  (oriented to self, place. Stated month was august. Somewhat delayed verbal responses.)  Insight: Mild  Processing Speed: Delayed    General Assessments:  General Observation  General Observation: Pt' sb hands fisted, limited ROM in all planes. B LE's with PF contractures. L foot inverted, interally rotated.   Activity Tolerance  Endurance: Decreased tolerance for upright activites              Postural Control  Postural Control: Impaired          Functional Assessments:     Bed Mobility  Bed Mobility: Yes  Bed Mobility 1  Bed  Mobility 1: Forward lean, Long sit  Level of Assistance 1: Moderate assistance  Bed Mobility 2  Bed Mobility  2: Rolling left  Level of Assistance 2: Moderate assistance (x1)  Bed Mobility Comments 2: pt. positioned in L sidelying at end of session for pressure relief/repositoning. RN aware  Transfers  Transfer: No (further mobility deferred secondary to pt. requires heavy A x2 for safe mobility due to weakness.)  Ambulation/Gait Training  Ambulation/Gait Training Performed: No  Stairs  Stairs: No       Extremity/Trunk Assessments:        RLE   RLE : Exceptions to WFL  AROM RLE (degrees)  RLE AROM Comment: severely limited AROM in all planes  Strength RLE  RLE Overall Strength:  (grossly 2/5 throughout with exception of R quad 1/5.)  LLE   LLE : Exceptions to WFL  AROM LLE (degrees)  LLE AROM Comment: limited AROM in all planes  Strength LLE  LLE Overall Strength:  (grossly 2/5 throughout with exception of L quad 1/5.)    Outcome Measures:     [facility] Basic Mobility  Turning from your back to your side while in a flat bed without using bedrails: A lot  Moving from lying on your back to sitting on the side of a flat bed without using bedrails: A lot  Moving to and from bed to chair (including a wheelchair): Total  Standing up from a chair using your arms (e.g. wheelchair or bedside chair): Total  To walk in hospital room: Total  Climbing 3-5 steps with railing: Total  Basic Mobility - Total Score: 8                                        Goals:  Encounter Problems       Encounter Problems (Active)       PT Problem       Pt. will perform bed mobility with mod A x 1        Start:  07/30/24    Expected End:  08/13/24            Pt. will perform transfers with mod A x 2        Start:  07/30/24    Expected End:  08/13/24            Pt. will require CGA for static/dynamic sitting balance to participate in ADLS        Start:  07/30/24    Expected End:  08/13/24               Pain - Adult            Education  Documentation  Precautions, taught by Jayashree Jones, PT at 7/30/2024  3:02 PM.  Learner: Patient  Readiness: Acceptance  Method: Explanation  Response: Verbalizes Understanding, Needs Reinforcement    Body Mechanics, taught by Jaayshree Jones, PT at 7/30/2024  3:02 PM.  Learner: Patient  Readiness: Acceptance  Method: Explanation  Response: Verbalizes Understanding, Needs Reinforcement    Mobility Training, taught by Jayashree Jones, PT at 7/30/2024  3:02 PM.  Learner: Patient  Readiness: Acceptance  Method: Explanation  Response: Verbalizes Understanding, Needs Reinforcement    Education Comments  No comments found.

## 2024-07-31 ENCOUNTER — APPOINTMENT (OUTPATIENT)
Dept: CARDIOLOGY | Facility: HOSPITAL | Age: 64
End: 2024-07-31
Payer: MEDICARE

## 2024-07-31 LAB
ALBUMIN SERPL BCP-MCNC: 2.4 G/DL (ref 3.4–5)
ALP SERPL-CCNC: 106 U/L (ref 33–136)
ALT SERPL W P-5'-P-CCNC: 7 U/L (ref 7–45)
ANION GAP SERPL CALC-SCNC: 16 MMOL/L (ref 10–20)
APTT PPP: 32 SECONDS (ref 27–38)
AST SERPL W P-5'-P-CCNC: 11 U/L (ref 9–39)
BASOPHILS # BLD AUTO: 0.03 X10*3/UL (ref 0–0.1)
BASOPHILS NFR BLD AUTO: 0.6 %
BILIRUB DIRECT SERPL-MCNC: 0.2 MG/DL (ref 0–0.3)
BILIRUB SERPL-MCNC: 0.5 MG/DL (ref 0–1.2)
BNP SERPL-MCNC: 896 PG/ML (ref 0–99)
BUN SERPL-MCNC: 33 MG/DL (ref 6–23)
BURR CELLS BLD QL SMEAR: NORMAL
CALCIUM SERPL-MCNC: 8.3 MG/DL (ref 8.6–10.6)
CHLORIDE SERPL-SCNC: 108 MMOL/L (ref 98–107)
CO2 SERPL-SCNC: 23 MMOL/L (ref 21–32)
CREAT SERPL-MCNC: 1.41 MG/DL (ref 0.5–1.05)
EGFRCR SERPLBLD CKD-EPI 2021: 42 ML/MIN/1.73M*2
EOSINOPHIL # BLD AUTO: 0.02 X10*3/UL (ref 0–0.7)
EOSINOPHIL NFR BLD AUTO: 0.4 %
ERYTHROCYTE [DISTWIDTH] IN BLOOD BY AUTOMATED COUNT: 22 % (ref 11.5–14.5)
GLUCOSE BLD MANUAL STRIP-MCNC: 130 MG/DL (ref 74–99)
GLUCOSE BLD MANUAL STRIP-MCNC: 179 MG/DL (ref 74–99)
GLUCOSE BLD MANUAL STRIP-MCNC: 181 MG/DL (ref 74–99)
GLUCOSE SERPL-MCNC: 131 MG/DL (ref 74–99)
HCT VFR BLD AUTO: 29 % (ref 36–46)
HEMOCCULT SP1 STL QL: NEGATIVE
HGB BLD-MCNC: 8.5 G/DL (ref 12–16)
IMM GRANULOCYTES # BLD AUTO: 0.04 X10*3/UL (ref 0–0.7)
IMM GRANULOCYTES NFR BLD AUTO: 0.7 % (ref 0–0.9)
INR PPP: 1.1 (ref 0.9–1.1)
LYMPHOCYTES # BLD AUTO: 2.18 X10*3/UL (ref 1.2–4.8)
LYMPHOCYTES NFR BLD AUTO: 40.2 %
MAGNESIUM SERPL-MCNC: 1.9 MG/DL (ref 1.6–2.4)
MCH RBC QN AUTO: 28.1 PG (ref 26–34)
MCHC RBC AUTO-ENTMCNC: 29.3 G/DL (ref 32–36)
MCV RBC AUTO: 96 FL (ref 80–100)
MONOCYTES # BLD AUTO: 0.72 X10*3/UL (ref 0.1–1)
MONOCYTES NFR BLD AUTO: 13.3 %
NEUTROPHILS # BLD AUTO: 2.43 X10*3/UL (ref 1.2–7.7)
NEUTROPHILS NFR BLD AUTO: 44.8 %
NRBC BLD-RTO: 1.1 /100 WBCS (ref 0–0)
PHOSPHATE SERPL-MCNC: 3.8 MG/DL (ref 2.5–4.9)
PLATELET # BLD AUTO: 103 X10*3/UL (ref 150–450)
POTASSIUM SERPL-SCNC: 4 MMOL/L (ref 3.5–5.3)
PROT SERPL-MCNC: 5.2 G/DL (ref 6.4–8.2)
PROTHROMBIN TIME: 12.6 SECONDS (ref 9.8–12.8)
RBC # BLD AUTO: 3.03 X10*6/UL (ref 4–5.2)
RBC MORPH BLD: NORMAL
SODIUM SERPL-SCNC: 143 MMOL/L (ref 136–145)
UFH PPP CHRO-ACNC: 0.1 IU/ML
WBC # BLD AUTO: 5.4 X10*3/UL (ref 4.4–11.3)

## 2024-07-31 PROCEDURE — 84075 ASSAY ALKALINE PHOSPHATASE: CPT

## 2024-07-31 PROCEDURE — 83880 ASSAY OF NATRIURETIC PEPTIDE: CPT

## 2024-07-31 PROCEDURE — 2500000001 HC RX 250 WO HCPCS SELF ADMINISTERED DRUGS (ALT 637 FOR MEDICARE OP)

## 2024-07-31 PROCEDURE — 82947 ASSAY GLUCOSE BLOOD QUANT: CPT

## 2024-07-31 PROCEDURE — 85520 HEPARIN ASSAY: CPT

## 2024-07-31 PROCEDURE — 2500000002 HC RX 250 W HCPCS SELF ADMINISTERED DRUGS (ALT 637 FOR MEDICARE OP, ALT 636 FOR OP/ED)

## 2024-07-31 PROCEDURE — C9113 INJ PANTOPRAZOLE SODIUM, VIA: HCPCS

## 2024-07-31 PROCEDURE — 84100 ASSAY OF PHOSPHORUS: CPT

## 2024-07-31 PROCEDURE — 1200000002 HC GENERAL ROOM WITH TELEMETRY DAILY

## 2024-07-31 PROCEDURE — 83735 ASSAY OF MAGNESIUM: CPT

## 2024-07-31 PROCEDURE — 80048 BASIC METABOLIC PNL TOTAL CA: CPT

## 2024-07-31 PROCEDURE — 93005 ELECTROCARDIOGRAM TRACING: CPT

## 2024-07-31 PROCEDURE — 36415 COLL VENOUS BLD VENIPUNCTURE: CPT

## 2024-07-31 PROCEDURE — 94640 AIRWAY INHALATION TREATMENT: CPT

## 2024-07-31 PROCEDURE — 99223 1ST HOSP IP/OBS HIGH 75: CPT

## 2024-07-31 PROCEDURE — 82270 OCCULT BLOOD FECES: CPT

## 2024-07-31 PROCEDURE — 99233 SBSQ HOSP IP/OBS HIGH 50: CPT | Performed by: INTERNAL MEDICINE

## 2024-07-31 PROCEDURE — 99497 ADVNCD CARE PLAN 30 MIN: CPT

## 2024-07-31 PROCEDURE — 2500000004 HC RX 250 GENERAL PHARMACY W/ HCPCS (ALT 636 FOR OP/ED)

## 2024-07-31 PROCEDURE — 85025 COMPLETE CBC W/AUTO DIFF WBC: CPT

## 2024-07-31 PROCEDURE — 85610 PROTHROMBIN TIME: CPT

## 2024-07-31 RX ORDER — TALC
6 POWDER (GRAM) TOPICAL NIGHTLY
Status: DISCONTINUED | OUTPATIENT
Start: 2024-07-31 | End: 2024-08-02 | Stop reason: HOSPADM

## 2024-07-31 ASSESSMENT — COGNITIVE AND FUNCTIONAL STATUS - GENERAL
PERSONAL GROOMING: A LOT
EATING MEALS: A LITTLE
STANDING UP FROM CHAIR USING ARMS: TOTAL
MOVING FROM LYING ON BACK TO SITTING ON SIDE OF FLAT BED WITH BEDRAILS: A LOT
MOBILITY SCORE: 8
DAILY ACTIVITIY SCORE: 13
TOILETING: A LOT
CLIMB 3 TO 5 STEPS WITH RAILING: TOTAL
TURNING FROM BACK TO SIDE WHILE IN FLAT BAD: A LOT
HELP NEEDED FOR BATHING: A LOT
DRESSING REGULAR LOWER BODY CLOTHING: A LOT
MOVING TO AND FROM BED TO CHAIR: TOTAL
WALKING IN HOSPITAL ROOM: TOTAL
DRESSING REGULAR UPPER BODY CLOTHING: A LOT

## 2024-07-31 ASSESSMENT — PAIN SCALES - GENERAL: PAINLEVEL_OUTOF10: 0 - NO PAIN

## 2024-07-31 NOTE — PROGRESS NOTES
"Bing Holliday is a 63 y.o. female on day 1 of admission presenting with Lupus (Multi).      Subjective   - NAEON    PM RFP resulted with persistent hypernaturemia despite D5W gtt. D5W stopped overnight. Heparin assays continued to be elevated overnight (2 > 1.7 > heparin gtt held > 1.1 > held heparin bolus). Per nursing, had \"specks\" of blood in the stools overnight, not noted in pictures seen by overnight resident. CBC stable (Hgb 9.4>8.7), vitals stable.    Wound care team saw patient overnight. Not following at this time, but can consult for worsening wound. Wound locations (left cheek, right buttock, right upper back, left lateral foot).    Labs:  Heparin assay 0.1; Na 143    Brief Plan:  Consult palliative medicine per conversation with son yesterday  Continue steroid dosing  Stop heparin gtt and restart home eliquis 5mg. Discontinue all anticoagulation on further chart review d/t recurrent bleeding and intermittent discontinuation of home Eliquis at baseline       Objective     Last Recorded Vitals  /82 (BP Location: Right arm, Patient Position: Lying)   Pulse 61   Temp 36.1 °C (97 °F) (Temporal)   Resp 17   Wt 97.9 kg (215 lb 13.3 oz)   SpO2 97%   Intake/Output last 3 Shifts:    Intake/Output Summary (Last 24 hours) at 7/31/2024 0642  Last data filed at 7/31/2024 0515  Gross per 24 hour   Intake 1333.24 ml   Output 1250 ml   Net 83.24 ml       Admission Weight  Weight: 97.9 kg (215 lb 13.3 oz) (07/30/24 0147)    Daily Weight  07/30/24 : 97.9 kg (215 lb 13.3 oz)    Image Results  ECG 12 lead  Atrial fibrillation with rapid ventricular response  Right superior axis deviation  ST & T wave abnormality, consider lateral ischemia  Abnormal ECG  When compared with ECG of 26-JUL-2024 08:38,  QRS axis Shifted left  ST no longer depressed in Lateral leads  T wave inversion less evident in Inferior leads  T wave inversion now evident in Anterior leads      Physical Exam  Constitutional:       General: She " is not in acute distress.     Appearance: Normal appearance.   HENT:      Head: Normocephalic and atraumatic.      Right Ear: External ear normal.      Left Ear: External ear normal.      Nose: Nose normal.      Mouth/Throat:      Mouth: Mucous membranes are moist.   Eyes:      General: No scleral icterus.     Extraocular Movements: Extraocular movements intact.      Conjunctiva/sclera: Conjunctivae normal.      Pupils: Pupils are equal, round, and reactive to light.   Cardiovascular:      Rate and Rhythm: Normal rate and regular rhythm.      Pulses: Normal pulses.      Heart sounds: Normal heart sounds. No murmur heard.     No friction rub. No gallop.   Pulmonary:      Effort: Pulmonary effort is normal. No respiratory distress.      Breath sounds: Normal breath sounds.   Abdominal:      General: Abdomen is flat. Bowel sounds are normal. There is no distension.      Palpations: Abdomen is soft. There is no mass.      Tenderness: There is no abdominal tenderness.   Musculoskeletal:         General: Tenderness (LLE) present. No swelling. Normal range of motion.      Right lower leg: No edema.      Left lower leg: No edema.   Skin:     General: Skin is warm and dry.      Capillary Refill: Capillary refill takes less than 2 seconds.      Findings: Bruising present.      Comments: LLE bruising, covered by bandage over the wound on foot to the mid calf, some bruising proximal to bandage   Neurological:      General: No focal deficit present.      Mental Status: She is alert. Mental status is at baseline.      Comments: Aox2-3 (oriented to person, place, and year but not date)   Psychiatric:         Mood and Affect: Mood normal.         Behavior: Behavior normal.         Thought Content: Thought content normal.         Scheduled medications  apixaban, 5 mg, oral, q12h  atorvastatin, 40 mg, oral, Nightly  budesonide, 0.5 mg, nebulization, BID  hydrocortisone, 10 mg, oral, q PM  hydrocortisone, 20 mg, oral, q AM  insulin  lispro, 0-5 Units, subcutaneous, TID  levETIRAcetam, 500 mg, oral, BID  metoprolol tartrate, 25 mg, oral, BID  mycophenolate, 1,000 mg, oral, BID  pantoprazole, 40 mg, intravenous, Daily  polyethylene glycol, 17 g, oral, Daily      Continuous medications     PRN medications  PRN medications: acetaminophen, dextrose, dextrose, glucagon, glucagon, ipratropium-albuteroL    Relevant Results  Results for orders placed or performed during the hospital encounter of 07/30/24 (from the past 24 hour(s))   Comprehensive metabolic panel   Result Value Ref Range    Glucose 103 (H) 74 - 99 mg/dL    Sodium 147 (H) 136 - 145 mmol/L    Potassium 4.3 3.5 - 5.3 mmol/L    Chloride 112 (H) 98 - 107 mmol/L    Bicarbonate 27 21 - 32 mmol/L    Anion Gap 12 10 - 20 mmol/L    Urea Nitrogen 36 (H) 6 - 23 mg/dL    Creatinine 1.60 (H) 0.50 - 1.05 mg/dL    eGFR 36 (L) >60 mL/min/1.73m*2    Calcium 8.6 8.6 - 10.6 mg/dL    Albumin 2.6 (L) 3.4 - 5.0 g/dL    Alkaline Phosphatase 108 33 - 136 U/L    Total Protein 5.4 (L) 6.4 - 8.2 g/dL    AST 11 9 - 39 U/L    Bilirubin, Total 0.4 0.0 - 1.2 mg/dL    ALT 9 7 - 45 U/L   POCT GLUCOSE   Result Value Ref Range    POCT Glucose 87 74 - 99 mg/dL   Renal Function Panel   Result Value Ref Range    Glucose 126 (H) 74 - 99 mg/dL    Sodium 147 (H) 136 - 145 mmol/L    Potassium 4.0 3.5 - 5.3 mmol/L    Chloride 110 (H) 98 - 107 mmol/L    Bicarbonate 26 21 - 32 mmol/L    Anion Gap 15 10 - 20 mmol/L    Urea Nitrogen 35 (H) 6 - 23 mg/dL    Creatinine 1.60 (H) 0.50 - 1.05 mg/dL    eGFR 36 (L) >60 mL/min/1.73m*2    Calcium 8.5 (L) 8.6 - 10.6 mg/dL    Phosphorus 3.7 2.5 - 4.9 mg/dL    Albumin 2.4 (L) 3.4 - 5.0 g/dL   Heparin Assay, UFH   Result Value Ref Range    Heparin Unfractionated 2.0 (HH) See Comment Below for Therapeutic Ranges IU/mL   POCT GLUCOSE   Result Value Ref Range    POCT Glucose 138 (H) 74 - 99 mg/dL   Heparin Assay, UFH   Result Value Ref Range    Heparin Unfractionated 1.7 (HH) See Comment Below for  Therapeutic Ranges IU/mL   POCT GLUCOSE   Result Value Ref Range    POCT Glucose 155 (H) 74 - 99 mg/dL   Heparin Assay, UFH   Result Value Ref Range    Heparin Unfractionated 1.1 (HH) See Comment Below for Therapeutic Ranges IU/mL   CBC and Auto Differential   Result Value Ref Range    WBC 5.6 4.4 - 11.3 x10*3/uL    nRBC 1.1 (H) 0.0 - 0.0 /100 WBCs    RBC 3.05 (L) 4.00 - 5.20 x10*6/uL    Hemoglobin 8.7 (L) 12.0 - 16.0 g/dL    Hematocrit 29.2 (L) 36.0 - 46.0 %    MCV 96 80 - 100 fL    MCH 28.5 26.0 - 34.0 pg    MCHC 29.8 (L) 32.0 - 36.0 g/dL    RDW 22.5 (H) 11.5 - 14.5 %    Platelets 112 (L) 150 - 450 x10*3/uL    Neutrophils % 48.7 40.0 - 80.0 %    Immature Granulocytes %, Automated 0.7 0.0 - 0.9 %    Lymphocytes % 39.6 13.0 - 44.0 %    Monocytes % 10.8 2.0 - 10.0 %    Eosinophils % 0.0 0.0 - 6.0 %    Basophils % 0.2 0.0 - 2.0 %    Neutrophils Absolute 2.74 1.20 - 7.70 x10*3/uL    Immature Granulocytes Absolute, Automated 0.04 0.00 - 0.70 x10*3/uL    Lymphocytes Absolute 2.23 1.20 - 4.80 x10*3/uL    Monocytes Absolute 0.61 0.10 - 1.00 x10*3/uL    Eosinophils Absolute 0.00 0.00 - 0.70 x10*3/uL    Basophils Absolute 0.01 0.00 - 0.10 x10*3/uL   Morphology   Result Value Ref Range    RBC Morphology See Below     Polychromasia Mild    CBC and Auto Differential   Result Value Ref Range    WBC 5.4 4.4 - 11.3 x10*3/uL    nRBC 1.1 (H) 0.0 - 0.0 /100 WBCs    RBC 3.03 (L) 4.00 - 5.20 x10*6/uL    Hemoglobin 8.5 (L) 12.0 - 16.0 g/dL    Hematocrit 29.0 (L) 36.0 - 46.0 %    MCV 96 80 - 100 fL    MCH 28.1 26.0 - 34.0 pg    MCHC 29.3 (L) 32.0 - 36.0 g/dL    RDW 22.0 (H) 11.5 - 14.5 %    Platelets 103 (L) 150 - 450 x10*3/uL    Neutrophils % 44.8 40.0 - 80.0 %    Immature Granulocytes %, Automated 0.7 0.0 - 0.9 %    Lymphocytes % 40.2 13.0 - 44.0 %    Monocytes % 13.3 2.0 - 10.0 %    Eosinophils % 0.4 0.0 - 6.0 %    Basophils % 0.6 0.0 - 2.0 %    Neutrophils Absolute 2.43 1.20 - 7.70 x10*3/uL    Immature Granulocytes Absolute,  Automated 0.04 0.00 - 0.70 x10*3/uL    Lymphocytes Absolute 2.18 1.20 - 4.80 x10*3/uL    Monocytes Absolute 0.72 0.10 - 1.00 x10*3/uL    Eosinophils Absolute 0.02 0.00 - 0.70 x10*3/uL    Basophils Absolute 0.03 0.00 - 0.10 x10*3/uL   Magnesium   Result Value Ref Range    Magnesium 1.90 1.60 - 2.40 mg/dL   Hepatic function panel   Result Value Ref Range    Albumin 2.4 (L) 3.4 - 5.0 g/dL    Bilirubin, Total 0.5 0.0 - 1.2 mg/dL    Bilirubin, Direct 0.2 0.0 - 0.3 mg/dL    Alkaline Phosphatase 106 33 - 136 U/L    ALT 7 7 - 45 U/L    AST 11 9 - 39 U/L    Total Protein 5.2 (L) 6.4 - 8.2 g/dL   Coagulation Screen   Result Value Ref Range    Protime 12.6 9.8 - 12.8 seconds    INR 1.1 0.9 - 1.1    aPTT 32 27 - 38 seconds   Phosphorus   Result Value Ref Range    Phosphorus 3.8 2.5 - 4.9 mg/dL   Basic Metabolic Panel   Result Value Ref Range    Glucose 131 (H) 74 - 99 mg/dL    Sodium 143 136 - 145 mmol/L    Potassium 4.0 3.5 - 5.3 mmol/L    Chloride 108 (H) 98 - 107 mmol/L    Bicarbonate 23 21 - 32 mmol/L    Anion Gap 16 10 - 20 mmol/L    Urea Nitrogen 33 (H) 6 - 23 mg/dL    Creatinine 1.41 (H) 0.50 - 1.05 mg/dL    eGFR 42 (L) >60 mL/min/1.73m*2    Calcium 8.3 (L) 8.6 - 10.6 mg/dL   Heparin Assay, UFH   Result Value Ref Range    Heparin Unfractionated 0.1 See Comment Below for Therapeutic Ranges IU/mL   Morphology   Result Value Ref Range    RBC Morphology See Below     [place] Cells Few    POCT GLUCOSE   Result Value Ref Range    POCT Glucose 130 (H) 74 - 99 mg/dL     *Note: Due to a large number of results and/or encounters for the requested time period, some results have not been displayed. A complete set of results can be found in Results Review.        Assessment/Plan      Bing Holliday is a 63 y.o. 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  The patient initially presented to OSH on 7/15 from her SNF with presumed septic shock 2/2 diverticulitis and adrenal insufficiency. OSH hospital course complicated by lower GI bleed 2/2 diverticulitis and epistaxis requiring 3 units pRBC and A-fib with RVR (HR 110s-130s).  Patient was transferred to [facility] for rheumatologic evaluation due to concerns of her presenting symptoms being secondary to her rheumatologic disease, but rheumatologic workup at OSH negative. She remains in the hospital for management of hypernaturemia present on admission, and placement in SNF.     #Encephalopathy   - Patient oriented x 3; however appeared drowsy on physical exam.  Patient has had significant steroid exposure which could contribute to her mild confusion.  - CT head on 7/8 negative for acute intracranial abnormalities  - Will obtain labs to evaluate for electrolyte disturbances or metabolic derangements contributing to the patient's current mental status.  - Last B12 and folate wnl, RPR negative in 2022  Plan:  - Labs: CBC, CMP, Phos  - Patient passed swallow study. Diet: thin liquids and regular solids. Can crush pills if needed.     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  - patient's rheumatologist, Dr. Hoyos, in [place] on 7/18  - home meds: mycophenalte 1000 mg BID  Plan:  - continue home meds     #Recurrent adrenal insufficiency   - had >6 recurrent episodes of adrenal insuffiencey within the past year  - Pt was admitted to the MICU at OSH with encephalopathy and sepsis in the setting of diverticulitis and concern for adrenal insuffiencey where she was started on IV steroids.   - Pt was seen by endocrinology at OSH and transitioned to oral steroids on 7/29:  "hydrocortisone 20 mg QAM and 10 mg QPM   - Concerns of \"pill pocketing\" was reported by OSH endocrinologist.   Plan:  - c/w current regimen  - daily labs      #Afib with RVR   #HFmREF (EF 40-45% 5/2024)  #CAD s/p CABG 2013  - [id number] Score 4  - ECG on arrival notable for A-fib, heart rate 104. ECG overnight on 7/29 notable for Afib with lateral T wave inversions (V4-V6). No chest pain noted.  - therapeutic anticoagulation was held at OSH due to hematochezia and epistasis which have since resolved.  - Heart rates in the 110-130s at OSH, patient was started on metoprolol tartrate 25 mg BID daily  - On further chart review, patient home eliquis frequently stopped and restarted 2/2 bleeding.  Plan:  - Stop heparin gtt. Eliquis home dose started in AM, but discontinued after further chart review.  - Continue with metoprolol tartrate 25 mg twice daily  - continuous tele   - Repeat ECG today     #Placement/Social  - Palliative consulted today, will see patient to assist with recs. Palliative at OSH had discussion with patient's HCPOA (son) and set up plan for palliative care when returned to SNF.  Plan:  - Continue to work on placement with SW  - Follow palliative recs    #Diverticulitis   #Hematochezia, resolved  - CT AP at OSH was notable for diverticulitis of the hepatic flexure. GI was consulted due to diverticulitis who recommended continuing antibiotics, outpatient colonoscopy in 6 weeks and CTA to identify source of active bleed.  CTA was deferred due to patient severely reduced GFR.  - OSH course complicated by diverticular bleed, patient received 3 units PRBC during this time.   - Completed course of Zosyn (7/17 - 7/22) at OSH  - Some possible specks of blood noted overnight by nursing, Hgb stable with no changes to vitals  Plan:  - CTM BMs  - outpatient colonoscopy  - daily CBC      #Hypernatremia   - likely secondary to poor PO intake due to AMS   - Na 147 on admission --> down to 143 on repeat RFP " following fluids  Plan:  - Continue to monitor      #HTN   #HLD  #Hypotensive  - Patient /72 on arrival, hypotension likely secondary to hypovolemia due to patient poor p.o. intake.  - home regimen: Atorvastatin 40 mg daily, furosemide 40 mg daily,   Plan:  - c/w home meds, except home lasix 40 mg daily due to JED and hypotension     #CKD3b   # Lupus nephritis  - Cr 1.59, (bl Cr 1.5-1.9)  - OSH course complicated by JED on CKD secondary to ATN in the setting of septic shock and acute GI bleed.  Creatinine was 2.6 during this time  Plan:  - Renally dose meds  - Avoid nephrotoxic agents  - Hold home lasix 40 mg daily      #Thrombocytopenia, improving   #Chronic Anemia  - Hb 9.4, baseline 8-10, Hb stable at ~9 since 7/27  - OSH platelet count of 68 on admission, during this time her MMF was held which has since been resumed. Platelet count improving throughout the course of admission.  - OSH course complicated by pancytopenia, diverticular bleed and epistasis, patient received 3 units PRBC during this time.  - No evidence of bleeding throughout [facility] admission. Hgb on CBC stable (9.4>8.5).    #DM Type 2  - last A1c 6.8 on 6/2024  - home regimen: lantus 10 units QAM, SSI  Plan:  - hold home meds  - SSI     #COPD  - Saturating well on room air  - home inhalers: Trelegy-Ellipta, albuterol-budesonide  Plan:  - c/w home inhalers  - prn DuoNeb     # History of seizures  Plan:  -Continue home Keppra 500 mg twice daily     # GERD  Plan:  - Continue home pantoprazole 40 mg daily     F: PRN  E: PRN  N: Regular solids and thin liquids. Pills may be crushed if pill-pocketing.  GI: pantoprazole 40mg every day  A: PIV  DVT ppx: heparin gtt    Code Status: DNR/DNI, no dialysis, but consents to invasive lines (reconfirmed with López Lang--son on 7/30)  Surrogate Medical Decision-maker: López Lang, son, [phone]    DALIA MATHIS, MS4

## 2024-07-31 NOTE — CONSULTS
Inpatient consult to Palliative Care  Consult performed by: JIM Patton-CNP  Consult ordered by: Rachna Vasquez MD        Palliative Medicine Consult  Complex medical decision making, symptom management, patient/family support    History obtained from chart review including ED note, H&P, patient's daily progress notes, review of lab/test results, and discussion with primary team and bedside RN.    Subjective    History of Present Illness  Bing Holliday is a 63 y.o. 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 Patient was transferred from [facility] to [facility] for rheumatologic evaluation. The patient initially presented to OSH on 7/15 from her skilled nursing facility due to AMS, hypotension, hypothermia, and hypoglycemic. Medical workup in the ED at OSH was notable for pancytopenia (hemoglobin 8.4 and platelets 68) and CT AP at OSH was notable for diverticulitis of the hepatic flexure. The patient was given IV fluids and Solu-Cortef due to concerns for adrenal insufficiency; however, her symptoms did not improve and she was subsequently transferred to the MICU for further management of presumed septic shock secondary to diverticulitis and adrenal insufficiency. Her OSH hospital course was complicated by lower GI bleed 2/2 diverticulitis epistaxis requiring 3 units pRBC and A-fib with RVR (HR 110s-130s). The patient was transferred to [facility] for rheumatologic evaluation due to concerns of her presenting symptoms being secondary to her rheumatologic disease.       Introduction to Palliative Care  Met with pt at bedside.   Patient alert and oriented, has capacity to make their own medical decisions  at this time.   Surrogate decision maker is López Lang (Son)  [phone].  Staff present: Shanice Caruso CNP  Palliative Medicine was introduced as a specialty service for patients with serious illness to help with symptom management, improve quality of life, assist with goals of care conversations, navigate complex decision making, and provide support to patients and families. Support and empathy was provided throughout the encounter. Provided reflective listening and presence.     Symptoms  Pain: denies at this time  Dyspnea: yes, on and off. Sometimes at rest but mostly with exertion.  Fatigue: takes naps during the day  Insomnia: trouble falling asleep when she is awakened by staff or noises.  Drowsiness: takes naps during the dy  Constipation: denies  Nausea: denies  Appetite: good  Anxiety: wants to stop having symptoms to stay out of the hospital  Depression: denies    Palliative Medicine Social History:  The patient is  and has two adult children, López and Sandra. Pt has 8 grandchildren and 1 great grandchildren. Ages vary from 27-1 year old. Pt states she is a proud grandma and enjoys their visits. 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 The patient states she has been in and out of the hospital many times, she has been in several different rehabs. She has not been in her current SNF long. She spends most of her day in her room, and requires a walker for ambulation. Pt states she has not been able to walk the halls and requires a lot of assistance.  She denies any recent falls. Hobbies/enjoyments include gospel and jazz music and playing games with her kids and grandkids. She enjoys cards and board games. Coping methods include her family and her spirituality. She in nondinominational. Denies any safety concerns.    Objective    Last Recorded Vitals  BP (!) 130/98   Pulse 97   Temp 36.1 °C (97 °F) (Temporal)   Resp  "16   Ht 1.61 m (5' 3.39\")   Wt 97.9 kg (215 lb 13.3 oz)   LMP  (LMP Unknown)   SpO2 96%   BMI 37.77 kg/m²      Physical Exam  Constitutional:       Appearance: She is ill-appearing.   HENT:      Head: Normocephalic and atraumatic.      Nose: Nose normal.      Mouth/Throat:      Pharynx: Oropharynx is clear.   Cardiovascular:      Rate and Rhythm: Rhythm irregular.   Pulmonary:      Breath sounds: Decreased breath sounds present.      Comments: Complains of intermittent sob. No rales or rhonchi.  Abdominal:      Palpations: Abdomen is soft.   Musculoskeletal:      Right lower leg: Edema present.      Left lower leg: Edema present.      Comments: General decreased mobility   Skin:     General: Skin is warm and dry.   Neurological:      Mental Status: She is oriented to person, place, and time.   Psychiatric:         Mood and Affect: Mood normal.         Behavior: Behavior normal.         Thought Content: Thought content normal.         Judgment: Judgment normal.          Relevant Results  Results for orders placed or performed during the hospital encounter of 07/30/24 (from the past 24 hour(s))   Comprehensive metabolic panel   Result Value Ref Range    Glucose 103 (H) 74 - 99 mg/dL    Sodium 147 (H) 136 - 145 mmol/L    Potassium 4.3 3.5 - 5.3 mmol/L    Chloride 112 (H) 98 - 107 mmol/L    Bicarbonate 27 21 - 32 mmol/L    Anion Gap 12 10 - 20 mmol/L    Urea Nitrogen 36 (H) 6 - 23 mg/dL    Creatinine 1.60 (H) 0.50 - 1.05 mg/dL    eGFR 36 (L) >60 mL/min/1.73m*2    Calcium 8.6 8.6 - 10.6 mg/dL    Albumin 2.6 (L) 3.4 - 5.0 g/dL    Alkaline Phosphatase 108 33 - 136 U/L    Total Protein 5.4 (L) 6.4 - 8.2 g/dL    AST 11 9 - 39 U/L    Bilirubin, Total 0.4 0.0 - 1.2 mg/dL    ALT 9 7 - 45 U/L   POCT GLUCOSE   Result Value Ref Range    POCT Glucose 87 74 - 99 mg/dL   Renal Function Panel   Result Value Ref Range    Glucose 126 (H) 74 - 99 mg/dL    Sodium 147 (H) 136 - 145 mmol/L    Potassium 4.0 3.5 - 5.3 mmol/L    Chloride " 110 (H) 98 - 107 mmol/L    Bicarbonate 26 21 - 32 mmol/L    Anion Gap 15 10 - 20 mmol/L    Urea Nitrogen 35 (H) 6 - 23 mg/dL    Creatinine 1.60 (H) 0.50 - 1.05 mg/dL    eGFR 36 (L) >60 mL/min/1.73m*2    Calcium 8.5 (L) 8.6 - 10.6 mg/dL    Phosphorus 3.7 2.5 - 4.9 mg/dL    Albumin 2.4 (L) 3.4 - 5.0 g/dL   Heparin Assay, UFH   Result Value Ref Range    Heparin Unfractionated 2.0 (HH) See Comment Below for Therapeutic Ranges IU/mL   POCT GLUCOSE   Result Value Ref Range    POCT Glucose 138 (H) 74 - 99 mg/dL   Heparin Assay, UFH   Result Value Ref Range    Heparin Unfractionated 1.7 (HH) See Comment Below for Therapeutic Ranges IU/mL   POCT GLUCOSE   Result Value Ref Range    POCT Glucose 155 (H) 74 - 99 mg/dL   Heparin Assay, UFH   Result Value Ref Range    Heparin Unfractionated 1.1 (HH) See Comment Below for Therapeutic Ranges IU/mL   CBC and Auto Differential   Result Value Ref Range    WBC 5.6 4.4 - 11.3 x10*3/uL    nRBC 1.1 (H) 0.0 - 0.0 /100 WBCs    RBC 3.05 (L) 4.00 - 5.20 x10*6/uL    Hemoglobin 8.7 (L) 12.0 - 16.0 g/dL    Hematocrit 29.2 (L) 36.0 - 46.0 %    MCV 96 80 - 100 fL    MCH 28.5 26.0 - 34.0 pg    MCHC 29.8 (L) 32.0 - 36.0 g/dL    RDW 22.5 (H) 11.5 - 14.5 %    Platelets 112 (L) 150 - 450 x10*3/uL    Neutrophils % 48.7 40.0 - 80.0 %    Immature Granulocytes %, Automated 0.7 0.0 - 0.9 %    Lymphocytes % 39.6 13.0 - 44.0 %    Monocytes % 10.8 2.0 - 10.0 %    Eosinophils % 0.0 0.0 - 6.0 %    Basophils % 0.2 0.0 - 2.0 %    Neutrophils Absolute 2.74 1.20 - 7.70 x10*3/uL    Immature Granulocytes Absolute, Automated 0.04 0.00 - 0.70 x10*3/uL    Lymphocytes Absolute 2.23 1.20 - 4.80 x10*3/uL    Monocytes Absolute 0.61 0.10 - 1.00 x10*3/uL    Eosinophils Absolute 0.00 0.00 - 0.70 x10*3/uL    Basophils Absolute 0.01 0.00 - 0.10 x10*3/uL   Morphology   Result Value Ref Range    RBC Morphology See Below     Polychromasia Mild    CBC and Auto Differential   Result Value Ref Range    WBC 5.4 4.4 - 11.3 x10*3/uL     nRBC 1.1 (H) 0.0 - 0.0 /100 WBCs    RBC 3.03 (L) 4.00 - 5.20 x10*6/uL    Hemoglobin 8.5 (L) 12.0 - 16.0 g/dL    Hematocrit 29.0 (L) 36.0 - 46.0 %    MCV 96 80 - 100 fL    MCH 28.1 26.0 - 34.0 pg    MCHC 29.3 (L) 32.0 - 36.0 g/dL    RDW 22.0 (H) 11.5 - 14.5 %    Platelets 103 (L) 150 - 450 x10*3/uL    Neutrophils % 44.8 40.0 - 80.0 %    Immature Granulocytes %, Automated 0.7 0.0 - 0.9 %    Lymphocytes % 40.2 13.0 - 44.0 %    Monocytes % 13.3 2.0 - 10.0 %    Eosinophils % 0.4 0.0 - 6.0 %    Basophils % 0.6 0.0 - 2.0 %    Neutrophils Absolute 2.43 1.20 - 7.70 x10*3/uL    Immature Granulocytes Absolute, Automated 0.04 0.00 - 0.70 x10*3/uL    Lymphocytes Absolute 2.18 1.20 - 4.80 x10*3/uL    Monocytes Absolute 0.72 0.10 - 1.00 x10*3/uL    Eosinophils Absolute 0.02 0.00 - 0.70 x10*3/uL    Basophils Absolute 0.03 0.00 - 0.10 x10*3/uL   Magnesium   Result Value Ref Range    Magnesium 1.90 1.60 - 2.40 mg/dL   Hepatic function panel   Result Value Ref Range    Albumin 2.4 (L) 3.4 - 5.0 g/dL    Bilirubin, Total 0.5 0.0 - 1.2 mg/dL    Bilirubin, Direct 0.2 0.0 - 0.3 mg/dL    Alkaline Phosphatase 106 33 - 136 U/L    ALT 7 7 - 45 U/L    AST 11 9 - 39 U/L    Total Protein 5.2 (L) 6.4 - 8.2 g/dL   Coagulation Screen   Result Value Ref Range    Protime 12.6 9.8 - 12.8 seconds    INR 1.1 0.9 - 1.1    aPTT 32 27 - 38 seconds   Phosphorus   Result Value Ref Range    Phosphorus 3.8 2.5 - 4.9 mg/dL   Basic Metabolic Panel   Result Value Ref Range    Glucose 131 (H) 74 - 99 mg/dL    Sodium 143 136 - 145 mmol/L    Potassium 4.0 3.5 - 5.3 mmol/L    Chloride 108 (H) 98 - 107 mmol/L    Bicarbonate 23 21 - 32 mmol/L    Anion Gap 16 10 - 20 mmol/L    Urea Nitrogen 33 (H) 6 - 23 mg/dL    Creatinine 1.41 (H) 0.50 - 1.05 mg/dL    eGFR 42 (L) >60 mL/min/1.73m*2    Calcium 8.3 (L) 8.6 - 10.6 mg/dL   Heparin Assay, UFH   Result Value Ref Range    Heparin Unfractionated 0.1 See Comment Below for Therapeutic Ranges IU/mL   Morphology   Result Value Ref  Range    RBC Morphology See Below     Katarzyna Cells Few    POCT GLUCOSE   Result Value Ref Range    POCT Glucose 130 (H) 74 - 99 mg/dL   Occult Blood, Stool    Specimen: Stool   Result Value Ref Range    Occult Blood, Stool X1 Negative Negative     *Note: Due to a large number of results and/or encounters for the requested time period, some results have not been displayed. A complete set of results can be found in Results Review.      ECG 12 lead  Atrial fibrillation with rapid ventricular response  Right superior axis deviation  ST & T wave abnormality, consider lateral ischemia  Abnormal ECG  When compared with ECG of 26-JUL-2024 08:38,  QRS axis Shifted left  ST no longer depressed in Lateral leads  T wave inversion less evident in Inferior leads  T wave inversion now evident in Anterior leads     Encounter Date: 07/30/24   ECG 12 lead   Result Value    Ventricular Rate 101    Atrial Rate 115    QRS Duration 92    QT Interval 362    QTC Calculation(Bazett) 469    R Axis 192    T Axis -14    QRS Count 16    Q Onset 214    T Offset 395    QTC Fredericia 430    Narrative    Atrial fibrillation with rapid ventricular response  Right superior axis deviation  ST & T wave abnormality, consider lateral ischemia  Abnormal ECG  When compared with ECG of 26-JUL-2024 08:38,  QRS axis Shifted left  ST no longer depressed in Lateral leads  T wave inversion less evident in Inferior leads  T wave inversion now evident in Anterior leads        Allergies  Ace inhibitors, Hydroxychloroquine, Lisinopril, and Sulfa (sulfonamide antibiotics)    Scheduled medications  atorvastatin, 40 mg, oral, Nightly  budesonide, 0.5 mg, nebulization, BID  hydrocortisone, 10 mg, oral, q PM  hydrocortisone, 20 mg, oral, q AM  insulin lispro, 0-5 Units, subcutaneous, TID  levETIRAcetam, 500 mg, oral, BID  metoprolol tartrate, 25 mg, oral, BID  mycophenolate, 1,000 mg, oral, BID  pantoprazole, 40 mg, intravenous, Daily  polyethylene glycol, 17 g, oral,  Daily      Continuous medications     PRN medications  PRN medications: acetaminophen, dextrose, dextrose, glucagon, glucagon, ipratropium-albuteroL     Assessment/Plan    Bing Holliday is a 63 y.o. 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 Patient was transferred from [facility] to [facility] for rheumatologic evaluation. The patient initially presented to OSH on 7/15 from her skilled nursing facility due to AMS, hypotension, hypothermia, and hypoglycemic. Medical workup in the ED at OSH was notable for pancytopenia (hemoglobin 8.4 and platelets 68) and CT AP at OSH was notable for diverticulitis of the hepatic flexure. The patient was given IV fluids and Solu-Cortef due to concerns for adrenal insufficiency; however, her symptoms did not improve and she was subsequently transferred to the MICU for further management of presumed septic shock secondary to diverticulitis and adrenal insufficiency. Her OSH hospital course was complicated by lower GI bleed 2/2 diverticulitis epistaxis requiring 3 units pRBC and A-fib with RVR (HR 110s-130s). The patient was transferred to [facility] for rheumatologic evaluation due to concerns of her presenting symptoms being secondary to her rheumatologic disease.     7/31: Palliative consulted for continuity, pt already seen by palliative at OSH, family declines hospice. Pt active with [facility] at her nursing home. Treatment limitations already in place. See recommendations below.    Palliative Performance Scale (PPS):  "30-40    ----------------------------------------------------------------------------------------------------------------------------------------------------------------------------------------------------------------------------------------------------------------------------------------------------------------------------------------------------------------------  Advanced Care Planning  Patient and/or family consented to a voluntary Advanced Care Planning meeting.   Serious Illness Assessment and Counseling:  Life Limiting Disease: SLE with adrenal insufficiency c/b GIB and Afib with RVR posing threat to life or function.     Disease Specific Information Provided/Prognosis Discussed: Patient's current clinical condition, including diagnosis, prognosis, and management plan were discussed.   Counseling provided on supports available for pt's holistic care, including Art/expressive therapy, music, and symptom relief.     Understanding/Overall Impression: Patient expressing limited understanding of overall health status and severity of illness.     Goals/Hopes: Discussion ensued about patient's goals for their medical care going forward. Allowed patient time to talk about his/her current quality of life, disease course/progression, and symptom and treatment burden. Discussed her goal is to be stabilized with her meds and illnesses so she does not have to keep coming to the hospital.    Fears/Worries/Concerns: not getting out of the hospital    Patient's Perception of Functional Status: \"can not walk the halls\"    Minimal Acceptable Quality of Life/Maximal [place] Tolerable for the Possibility of More Time: Counseling provided on the concept of MAO/Maximal [place]. Patient expressing that they would never want to be in a health state where they were unable to have a meaningful conversation with her friends/family. Pt believes if she lost her cognition, that would not be a QOL for her.    Health Preferences and " Priorities with Disease Progression:   Family is not aware of wishes to not pursue CPR or intubation as indicated as DNR/DNI on file, as disease progresses to a terminal state. Treatment limitations already done prior to this consultation.      Advanced Directives:  Next of Kin: son and dtr  Surrogate Health Care Decision Maker: López Lang and Sandra Holliday.   HPOA: not on file  Living will: not on file      Hospice Discussion/Eligibility: Pt/family refused hospice previously.    I spent 33 minutes in providing separately identifiable ACP services with the patient and/or surrogate decision maker in a voluntary conversation discussing the patient's wishes and goals as detailed in the above note.   ----------------------------------------------------------------------------------------------------------------------------------------------------------------------------------------------------------------------------------------------------------------------------------------------------------------------------------------------------------------------    #Complex Medical Decision Making  #Goals of Care  #Advanced Care Planning  - Code status: DNR/DNI  - Surrogate decision maker: López Lang and Sandra Holliday.   - Goals are mix of survival and time and improved quality of life  7/30: Met with pt, reviewed pt's goals, wishes, and health preferences on how to increase her QOL and her hospitalization. See below.     #Acute Pain   - In no acute pain. States she does not always need anything to control her generalized aches/pain. Has Acetaminophen 650mg Q6H prn pain which helps.  - Continue Q2H turns and decubitus ulcer protocol prevention of worsening ulcerations as an additional pain control source. Is wearing heel off loading boots and currently with a wedge turned to her right.      #Dyspnea  - Has Duo-neb Q4H prn wheeze/sob. No administrations since admission. Encouraged use if feeling sob.   - Pt positioning may be  an underlying factor with legs elevated, pushing on abdomen, prohibiting full lung expansion. Please adjust foot and HOB accordingly. (Adjusted during consult.)  - Please educate, provide, and assist with incentive spirometer.  - Last CXR 7/17 showing Mild patchy bilateral ground-glass opacities as well as mild prominence of the interstitium. Possible minimal-small pleural  effusions. Mild cardiomegaly. Findings are concerning for CHF and  mild pulmonary edema however inflammatory or atypical infectious  etiologies could have this appearance. Recommend CXR for follow up/underlying pulmonary/cardiac etiology.      #Anxiety  - Art and music therapy orders placed  - Primary team communication on treatment plan and disposition.      #Insomnia  - States trouble falling back asleep when awakened by staff or noises.  Please institute rest protocol and no vital signs or labs between 0100-8890. Grouped care.  But please continue Q2H turns.   - Please consider Melatonin 6mg at 2100 to help facilitate falling back asleep.      #Psychosocial Support  - Music Therapy  - Spiritual Care Support  - Art Therapy    Plan of Care discussed with: Updated MD primary team and bedside RN on goals of care decision, medication adjustments, and code status     Medical Decision Making was high level due to high complexity of problems, extensive data review, and high risk of management/treatment.     - SLE with adrenal insufficiency, shock requiring pressor support, c/b bleeding and Afib with RVR posing threat to life or function.   - Reviewed external notes from [facility]  - Reviews results from  which were used in decision making for   - Recommended the following tests: CXR  - Assessment required independent historian: nursing  - Independent interpretation of test: labs and scan  - Discussion of management with: nursing  - Drug therapy requiring intensive monitoring for toxicity: insulin  - Decision regarding elective major surgery with  identified patient or procedure risk factors:   - Decision regarding emergency major surgery:   - Decision regarding hospitalization or escalation of hospital-level care:   - Decision not to resuscitate or to de-escalate care because of poor prognosis:   - Parenteral controlled substances: protonix iv for bleeding, insulin    Thank you for allowing us to participate in the care of this patient. Palliative will continue to follow as needed. Palliative medicine is available Monday-Friday, 8a-6p. Please contact team with any questions or concerns.  Team pager [phone] (weekdays)  Eden Caruso, CNP

## 2024-07-31 NOTE — NURSING NOTE
2004- [place] team MD ESTER Lozano notified/aware 1900 resulted heparin assay, heparin gtt hold at this time and redraw in 2hrs. Patient appeared drowsy with bedside RN. Unable to address patient need at this time, q2turn, call light within reach.     2154- Patient cooperated in medication administration, requested apple sauce with medication. Patient tolerated PO intake. After medication administration, patient appear drowsy/eyes closed and respond minimal to verbal questions/cues.    2230-heparin assay resulted, MD okayed to hold heparin gtt for the night. CBC drawn. Will redraw assay in AM. Patient appeared alert aox3, respond appropriately cooperative incontinence care. Patient have no need at this time, call light within reach.     0054- VSS obtained, patient converse with bedside RN. Refused q2turn at this time stated she is comfortable. Patient have no need at this time, call light within reach.     0330- Wound dressing done on left foot. Patient tolerated incontinence care and turn. Patient requested ice chips. No further need at this time, call light within reach.

## 2024-07-31 NOTE — CARE PLAN
Problem: Skin  Goal: Decreased wound size/increased tissue granulation at next dressing change  Outcome: Progressing  Flowsheets (Taken 7/31/2024 0106)  Decreased wound size/increased tissue granulation at next dressing change:   Promote sleep for wound healing   Protective dressings over bony prominences  Goal: Participates in plan/prevention/treatment measures  Outcome: Progressing  Flowsheets (Taken 7/31/2024 0106)  Participates in plan/prevention/treatment measures: Elevate heels  Goal: Prevent/manage excess moisture  Outcome: Progressing  Flowsheets (Taken 7/31/2024 0106)  Prevent/manage excess moisture:   Cleanse incontinence/protect with barrier cream   Monitor for/manage infection if present   Moisturize dry skin  Goal: Prevent/minimize sheer/friction injuries  Outcome: Progressing  Flowsheets (Taken 7/31/2024 0106)  Prevent/minimize sheer/friction injuries:   HOB 30 degrees or less   Turn/reposition every 2 hours/use positioning/transfer devices   Use pull sheet  Goal: Promote/optimize nutrition  Outcome: Progressing  Flowsheets (Taken 7/31/2024 0106)  Promote/optimize nutrition:   Monitor/record intake including meals   Assist with feeding  Goal: Promote skin healing  Outcome: Progressing  Flowsheets (Taken 7/31/2024 0106)  Promote skin healing:   Protective dressings over bony prominences   Turn/reposition every 2 hours/use positioning/transfer devices   Rotate device position/do not position patient on device   Assess skin/pad under line(s)/device(s)      The clinical goals for the shift include Patient will be HDS w/ heparin gtt this shift.

## 2024-07-31 NOTE — CARE PLAN
The patient's goals for the shift include      The clinical goals for the shift include Patient will take PO meds this shift.      Problem: Pain - Adult  Goal: Verbalizes/displays adequate comfort level or baseline comfort level  Outcome: Progressing     Problem: Safety - Adult  Goal: Free from fall injury  Outcome: Progressing     Problem: Discharge Planning  Goal: Discharge to home or other facility with appropriate resources  Outcome: Progressing     Problem: Chronic Conditions and Co-morbidities  Goal: Patient's chronic conditions and co-morbidity symptoms are monitored and maintained or improved  Outcome: Progressing     Problem: Skin  Goal: Decreased wound size/increased tissue granulation at next dressing change  Outcome: Progressing  Flowsheets (Taken 7/31/2024 0748)  Decreased wound size/increased tissue granulation at next dressing change:   Promote sleep for wound healing   Protective dressings over bony prominences  Goal: Participates in plan/prevention/treatment measures  Outcome: Progressing  Flowsheets (Taken 7/31/2024 0748)  Participates in plan/prevention/treatment measures: Elevate heels  Goal: Prevent/manage excess moisture  Outcome: Progressing  Flowsheets (Taken 7/31/2024 0748)  Prevent/manage excess moisture:   Cleanse incontinence/protect with barrier cream   Monitor for/manage infection if present   Moisturize dry skin  Goal: Prevent/minimize sheer/friction injuries  Outcome: Progressing  Flowsheets (Taken 7/31/2024 0748)  Prevent/minimize sheer/friction injuries:   HOB 30 degrees or less   Turn/reposition every 2 hours/use positioning/transfer devices   Use pull sheet  Goal: Promote/optimize nutrition  Outcome: Progressing  Flowsheets (Taken 7/31/2024 0748)  Promote/optimize nutrition:   Offer water/supplements/favorite foods   Consume > 50% meals/supplements   Monitor/record intake including meals  Goal: Promote skin healing  Outcome: Progressing  Flowsheets (Taken 7/31/2024 0748)  Promote  skin healing:   Assess skin/pad under line(s)/device(s)   Protective dressings over bony prominences   Turn/reposition every 2 hours/use positioning/transfer devices     Problem: Fall/Injury  Goal: Verbalize understanding of personal risk factors for fall in the hospital  Outcome: Progressing  Goal: Verbalize understanding of risk factor reduction measures to prevent injury from fall in the home  Outcome: Progressing  Goal: Use assistive devices by end of the shift  Outcome: Progressing  Goal: Pace activities to prevent fatigue by end of the shift  Outcome: Progressing     Problem: Diabetes  Goal: Achieve decreasing blood glucose levels by end of shift  Outcome: Progressing  Goal: Increase stability of blood glucose readings by end of shift  Outcome: Progressing  Goal: Maintain electrolyte levels within acceptable range throughout shift  Outcome: Progressing  Goal: Maintain glucose levels >70mg/dl to <250mg/dl throughout shift  Outcome: Progressing  Goal: No changes in neurological exam by end of shift  Outcome: Progressing  Goal: Learn about and adhere to nutrition recommendations by end of shift  Outcome: Progressing  Goal: Vital signs within normal range for age by end of shift  Outcome: Progressing

## 2024-08-01 LAB
ALBUMIN SERPL BCP-MCNC: 2.7 G/DL (ref 3.4–5)
ANION GAP SERPL CALC-SCNC: 16 MMOL/L (ref 10–20)
APTT PPP: 35 SECONDS (ref 27–38)
ATRIAL RATE: 340 BPM
BUN SERPL-MCNC: 30 MG/DL (ref 6–23)
CALCIUM SERPL-MCNC: 8.7 MG/DL (ref 8.6–10.6)
CHLORIDE SERPL-SCNC: 108 MMOL/L (ref 98–107)
CO2 SERPL-SCNC: 22 MMOL/L (ref 21–32)
CREAT SERPL-MCNC: 1.63 MG/DL (ref 0.5–1.05)
EGFRCR SERPLBLD CKD-EPI 2021: 36 ML/MIN/1.73M*2
ERYTHROCYTE [DISTWIDTH] IN BLOOD BY AUTOMATED COUNT: 21.8 % (ref 11.5–14.5)
GLUCOSE BLD MANUAL STRIP-MCNC: 115 MG/DL (ref 74–99)
GLUCOSE BLD MANUAL STRIP-MCNC: 120 MG/DL (ref 74–99)
GLUCOSE BLD MANUAL STRIP-MCNC: 124 MG/DL (ref 74–99)
GLUCOSE SERPL-MCNC: 126 MG/DL (ref 74–99)
HCT VFR BLD AUTO: 27.7 % (ref 36–46)
HGB BLD-MCNC: 8.2 G/DL (ref 12–16)
INR PPP: 1.2 (ref 0.9–1.1)
MCH RBC QN AUTO: 28 PG (ref 26–34)
MCHC RBC AUTO-ENTMCNC: 29.6 G/DL (ref 32–36)
MCV RBC AUTO: 95 FL (ref 80–100)
NRBC BLD-RTO: 0.8 /100 WBCS (ref 0–0)
PHOSPHATE SERPL-MCNC: 4.2 MG/DL (ref 2.5–4.9)
PLATELET # BLD AUTO: 111 X10*3/UL (ref 150–450)
POTASSIUM SERPL-SCNC: 3.8 MMOL/L (ref 3.5–5.3)
PROTHROMBIN TIME: 14 SECONDS (ref 9.8–12.8)
Q ONSET: 216 MS
QRS COUNT: 13 BEATS
QRS DURATION: 94 MS
QT INTERVAL: 408 MS
QTC CALCULATION(BAZETT): 476 MS
QTC FREDERICIA: 452 MS
R AXIS: -13 DEGREES
RBC # BLD AUTO: 2.93 X10*6/UL (ref 4–5.2)
SODIUM SERPL-SCNC: 142 MMOL/L (ref 136–145)
T AXIS: 199 DEGREES
T OFFSET: 420 MS
VENTRICULAR RATE: 82 BPM
WBC # BLD AUTO: 4.9 X10*3/UL (ref 4.4–11.3)

## 2024-08-01 PROCEDURE — 2500000001 HC RX 250 WO HCPCS SELF ADMINISTERED DRUGS (ALT 637 FOR MEDICARE OP)

## 2024-08-01 PROCEDURE — 36415 COLL VENOUS BLD VENIPUNCTURE: CPT

## 2024-08-01 PROCEDURE — 97535 SELF CARE MNGMENT TRAINING: CPT | Mod: GO

## 2024-08-01 PROCEDURE — 82947 ASSAY GLUCOSE BLOOD QUANT: CPT

## 2024-08-01 PROCEDURE — 2500000004 HC RX 250 GENERAL PHARMACY W/ HCPCS (ALT 636 FOR OP/ED)

## 2024-08-01 PROCEDURE — 85027 COMPLETE CBC AUTOMATED: CPT

## 2024-08-01 PROCEDURE — 97165 OT EVAL LOW COMPLEX 30 MIN: CPT | Mod: GO

## 2024-08-01 PROCEDURE — C9113 INJ PANTOPRAZOLE SODIUM, VIA: HCPCS

## 2024-08-01 PROCEDURE — 99233 SBSQ HOSP IP/OBS HIGH 50: CPT | Performed by: INTERNAL MEDICINE

## 2024-08-01 PROCEDURE — 80069 RENAL FUNCTION PANEL: CPT

## 2024-08-01 PROCEDURE — 2500000002 HC RX 250 W HCPCS SELF ADMINISTERED DRUGS (ALT 637 FOR MEDICARE OP, ALT 636 FOR OP/ED)

## 2024-08-01 PROCEDURE — 1100000001 HC PRIVATE ROOM DAILY

## 2024-08-01 PROCEDURE — 85610 PROTHROMBIN TIME: CPT

## 2024-08-01 PROCEDURE — 97530 THERAPEUTIC ACTIVITIES: CPT | Mod: GP

## 2024-08-01 RX ORDER — HEPARIN SODIUM 5000 [USP'U]/ML
5000 INJECTION, SOLUTION INTRAVENOUS; SUBCUTANEOUS EVERY 8 HOURS
Status: DISCONTINUED | OUTPATIENT
Start: 2024-08-01 | End: 2024-08-02 | Stop reason: HOSPADM

## 2024-08-01 RX ORDER — ENOXAPARIN SODIUM 100 MG/ML
40 INJECTION SUBCUTANEOUS ONCE
Status: COMPLETED | OUTPATIENT
Start: 2024-08-01 | End: 2024-08-01

## 2024-08-01 RX ORDER — FUROSEMIDE 40 MG/1
40 TABLET ORAL DAILY
Status: DISCONTINUED | OUTPATIENT
Start: 2024-08-01 | End: 2024-08-02 | Stop reason: HOSPADM

## 2024-08-01 ASSESSMENT — PAIN SCALES - GENERAL
PAINLEVEL_OUTOF10: 0 - NO PAIN
PAINLEVEL_OUTOF10: 0 - NO PAIN

## 2024-08-01 ASSESSMENT — COGNITIVE AND FUNCTIONAL STATUS - GENERAL
EATING MEALS: A LITTLE
WALKING IN HOSPITAL ROOM: TOTAL
CLIMB 3 TO 5 STEPS WITH RAILING: TOTAL
HELP NEEDED FOR BATHING: A LOT
DAILY ACTIVITIY SCORE: 12
PERSONAL GROOMING: A LITTLE
MOVING TO AND FROM BED TO CHAIR: TOTAL
MOVING FROM LYING ON BACK TO SITTING ON SIDE OF FLAT BED WITH BEDRAILS: TOTAL
DRESSING REGULAR LOWER BODY CLOTHING: TOTAL
MOBILITY SCORE: 6
DRESSING REGULAR UPPER BODY CLOTHING: A LOT
TURNING FROM BACK TO SIDE WHILE IN FLAT BAD: TOTAL
STANDING UP FROM CHAIR USING ARMS: TOTAL
TOILETING: TOTAL

## 2024-08-01 ASSESSMENT — PAIN - FUNCTIONAL ASSESSMENT
PAIN_FUNCTIONAL_ASSESSMENT: 0-10

## 2024-08-01 ASSESSMENT — ACTIVITIES OF DAILY LIVING (ADL)
BATHING_ASSISTANCE: MAXIMAL
ADL_ASSISTANCE: NEEDS ASSISTANCE
HOME_MANAGEMENT_TIME_ENTRY: 14

## 2024-08-01 NOTE — PROGRESS NOTES
Occupational Therapy    Evaluation and Treatment    Patient Name: Bing Holliday  MRN: [id number]  Today's Date: 8/1/2024  Room: [address] [place] [address]  Time Calculation  Start Time: 1207  Stop Time: 1236  Time Calculation (min): 29 min    Assessment  IP OT Assessment  OT Assessment: Pt demonstrates decreased independence in ADLs, endurance, and fxl mobility and would benenfit from mod intensity skillled OT to address deficits and return to PLOF.  Evaluation/Treatment Tolerance: Patient limited by pain  End of Session Patient Position: Bed, 3 rail up, Alarm on    Plan:  Inpatient Plan  Treatment Interventions: ADL retraining, Functional transfer training, UE strengthening/ROM, Endurance training, Equipment evaluation/education, Neuromuscular reeducation, Fine motor coordination activities, Compensatory technique education  OT Frequency: 3 times per week  OT Discharge Recommendations: Moderate intensity level of continued care  OT Recommended Transfer Status: Dependent  OT - OK to Discharge: Yes  OT Assessment  OT Assessment Results: Decreased ADL status, Decreased upper extremity range of motion, Decreased upper extremity strength, Decreased cognition, Decreased endurance, Decreased functional mobility, Decreased fine motor control, Decreased gross motor control  Evaluation/Treatment Tolerance: Patient limited by pain    Subjective   Current Problem:  1. Lupus (Multi)          General:  Reason for Referral: initially presented due to AMS. Patient was found to be in shock requiring pressors due to either sepsis or adrenal insufficiency and was admitted to the ICU. C/f GIB. [facility]red [facility] due to concerns regarding underlying rheumatologic etiology causing symptoms.  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     Precautions:  Medical Precautions: Fall precautions    Vital Signs:  Heart Rate: 96 (supine)    Pain:  Pain Assessment  Pain Assessment: 0-10  0-10 (Numeric) Pain Score:  (denies pain at rest, reports pain in R foot when touched, did not rate)    Lines/Tubes/Drains:  Urethral Catheter Non-latex 16 Fr. (Active)   Number of days: 3         Objective   Cognition:  Overall Cognitive Status: Impaired (questionable historian, claims she stood on her own last night IND)  Orientation Level:  (A&Ox3)  Insight: Mild  Processing Speed: Delayed           Home Living:  Type of Home: Skilled Nursing facility  Home Adaptive Equipment: Cane, Walker rolling or standard, Wheelchair-manual (rollator)  Home Living Comments: Pt admitted from SNF. Prior to SNF, pt reports she lived with adult son with 3STE, bed on main floor, full bath 2nd floor.     Prior Function:  Level of [place]: Needs assistance with homemaking, Needs assistance with ADLs  ADL Assistance: Needs assistance (Reports she got bathed at SNF in shower but did not need help and did other ADLS IND, first reported she used bathroom at SNF then reported she wore depends.)  Homemaking Assistance: Needs assistance (SNF staff took care of homemaking, at home, son or family completed for Pt)  Ambulatory Assistance: Needs assistance (pt reports ambulating with walker at SNF)  Leisure: \"not really\"  Hand Dominance: Right  Prior Function Comments: questionable historian, - drives, - falls,    IADL History:  IADL Comments: Pt reports not getting OT at SNF because they said she did not need it, reports working on stairs with PT at SNF, questionable historian.    ADL:  Eating Assistance: " Minimal (anticipate)  Grooming Assistance: Minimal (anticipate for reaching top and back of head)  Bathing Assistance: Maximal (anticipate)  UE Dressing Assistance: Moderate (asist threading arms and sequencing)  LE Dressing Assistance: Total (don/doff socks)  Toileting Assistance with Device: Total  Toileting Deficit: Urinary Catheter    Activity Tolerance:  Endurance: Decreased tolerance for upright activites    Balance:  Dynamic Sitting Balance  Dynamic Sitting-Balance Support: Feet supported  Dynamic Sitting-Balance: Lateral lean, Forward lean, Reaching for objects  Dynamic Sitting-Comments: CGA leaning while dressing  Static Sitting Balance  Static Sitting-Balance Support: Feet supported  Static Sitting-Level of Assistance:  (CGA-SBA)  Static Sitting-Comment/Number of Minutes: Pt seated EOB for ~15 mins    Bed Mobility/Transfers: Bed Mobility/Transfers: Bed Mobility  Bed Mobility: Yes  Bed Mobility 1  Bed Mobility 1: Supine to sitting, Sitting to supine  Level of Assistance 1: Maximum assistance (x2)  Bed Mobility Comments 1: Assist with trunk and B LEs, VCs for sequencing and using bed rail. HOB elevated  Bed Mobility 2  Bed Mobility  2: Scooting  Level of Assistance 2: Dependent (x2)  Bed Mobility Comments 2: Boost to HOB, draw sheet   and Transfers  Transfer: No (stand attempted with dependent x2 arm in arm assist, did not elevate. Pt scream out in pain (R ankle) and reports she cannot stand.)    IADL's:   IADL Comments: Pt reports not getting OT at SNF because they said she did not need it, reports working on stairs with PT at SNF, questionable historian.    Vision: Vision - Basic Assessment  Current Vision: Wears glasses all the time    Sensation:  Sensation Comment: reports numbness in tongue    Strength:  Strength Comments: B UEs grossly 3/5    Hand Function:  Hand Function  Gross Grasp: Impaired  Coordination: Impaired (Cannot actively extend digits 3-5 on B hands, reports pain with PROM of  digits)    Extremities:   RUE   RUE :  (Shoulder AROM 85degrees, elbow WFL), LUE   LUE:  (Shoulder AROM 80degrees, elbow WFL),       Outcome Measures: [facility] Daily Activity  Putting on and taking off regular lower body clothing: Total  Bathing (including washing, rinsing, drying): A lot  Putting on and taking off regular upper body clothing: A lot  Toileting, which includes using toilet, bedpan or urinal: Total  Taking care of personal grooming such as brushing teeth: A little  Eating Meals: A little  Daily Activity - Total Score: 12  Brief Confusion Assessment Method (bCAM)  CAM Result: CAM +     Education Documentation  Body Mechanics, taught by OLLIE Guerrero at 8/1/2024  1:15 PM.  Learner: Patient  Readiness: Acceptance  Method: Explanation  Response: Needs Reinforcement    Precautions, taught by OLLIE Guerrero at 8/1/2024  1:15 PM.  Learner: Patient  Readiness: Acceptance  Method: Explanation  Response: Needs Reinforcement    ADL Training, taught by OLLIE Guerrero at 8/1/2024  1:15 PM.  Learner: Patient  Readiness: Acceptance  Method: Explanation  Response: Needs Reinforcement    Education Comments  No comments found.        Goals:   Encounter Problems       Encounter Problems (Active)       ADLs       Patient will complete upper body dressing with independently donning and doffing all UE clothes. (Progressing)       Start:  08/01/24    Expected End:  08/15/24            Patient with complete lower body dressing with mod I donning and doffing all LE clothes  with PRN adaptive equipment while seated. (Progressing)       Start:  08/01/24    Expected End:  08/15/24            Patient will complete daily grooming tasks with mod I PRN adaptive equipment while seated. (Progressing)       Start:  08/01/24    Expected End:  08/15/24               BALANCE       Pt will maintain dynamic sitting balance during ADL task independently in order to demonstrate decreased risk of falling and improved postural  control. (Progressing)       Start:  08/01/24    Expected End:  08/15/24               TRANSFERS       Patient will perform bed mobility with moderate assist in order to improve safety and independence with mobility (Progressing)       Start:  08/01/24    Expected End:  08/15/24            Patient will complete sit to stand transfer with mod assist x2 and LRAD in order to improve safety and prepare for out of bed mobility. (Progressing)       Start:  08/01/24    Expected End:  08/15/24                   Treatment Completed on Evaluation    Activities of Daily Living:    Grooming  Grooming Level of Assistance: Setup  Grooming Where Assessed: Edge of bed  Grooming Comments: Facial hygiene at EOB. increased time to complete, Pt clean face, ears, glasses        UE Dressing  UE Dressing Level of Assistance: Moderate assistance  UE Dressing Where Assessed: Edge of bed  UE Dressing Comments: don/doff gown, assist threading arms and pulling gown up, VCs for sequencing  LE Dressing  LE Dressing: Yes  Sock Level of Assistance: Dependent  LE Dressing Where Assessed: Bed level (don/doff socks)       Therapy/Activity:     Therapeutic Activity  Therapeutic Activity Performed: Yes  Therapeutic Activity 1: Pt sat EOB for 15 mins to increase functional sitting tolerence. Pt with increased time to complete all activities and mobility. Pt completed LB dressing with total assist, UB dressing with mod assist, and grooming with set up A. Attempted to stand dependentx2, unable to elevate. Pt reports pain in R ankle. Max x2 to return to supine.            08/01/24 at 1:17 PM   OLLIE Guerrero   Rehab Office: [phone]

## 2024-08-01 NOTE — CARE PLAN
Problem: Skin  Goal: Decreased wound size/increased tissue granulation at next dressing change  Outcome: Progressing  Flowsheets (Taken 7/31/2024 0748 by Alexa Peña, RN)  Decreased wound size/increased tissue granulation at next dressing change:   Promote sleep for wound healing   Protective dressings over bony prominences  Goal: Participates in plan/prevention/treatment measures  Outcome: Progressing  Flowsheets (Taken 7/31/2024 0748 by Alexa Peña, RN)  Participates in plan/prevention/treatment measures: Elevate heels  Goal: Prevent/manage excess moisture  Outcome: Progressing  Flowsheets (Taken 8/1/2024 0355)  Prevent/manage excess moisture: Cleanse incontinence/protect with barrier cream  Goal: Prevent/minimize sheer/friction injuries  Outcome: Progressing  Flowsheets (Taken 8/1/2024 0355)  Prevent/minimize sheer/friction injuries: Turn/reposition every 2 hours/use positioning/transfer devices  Goal: Promote/optimize nutrition  Outcome: Progressing  Flowsheets (Taken 8/1/2024 0355)  Promote/optimize nutrition:   Assist with feeding   Offer water/supplements/favorite foods  Goal: Promote skin healing  Outcome: Progressing  Flowsheets (Taken 7/31/2024 0748 by Alexa Peña, RN)  Promote skin healing:   Assess skin/pad under line(s)/device(s)   Protective dressings over bony prominences   Turn/reposition every 2 hours/use positioning/transfer devices   The patient's goals for the shift include      The clinical goals for the shift include pt will remian injury free this shift

## 2024-08-01 NOTE — PROGRESS NOTES
Physical Therapy    Physical Therapy Treatment    Patient Name: Bing Holliday  MRN: [id number]  Today's Date: 8/1/2024  Time Calculation  Start Time: 1210  Stop Time: 1236  Time Calculation (min): 26 min    Assessment/Plan   PT Assessment  End of Session Communication: Bedside nurse  Assessment Comment: patient tolerated session fair; poor historian and max A x2  End of Session Patient Position: Bed, 3 rail up, Alarm off, not on at start of session     PT Plan  Treatment/Interventions: Bed mobility, Transfer training, Gait training, Balance training, Neuromuscular re-education, Strengthening, Endurance training, Therapeutic exercise, Therapeutic activity, Home exercise program  PT Plan: Ongoing PT  PT Frequency: 2 times per week  PT Discharge Recommendations: Moderate intensity level of continued care  PT Recommended Transfer Status: Total assist  PT - OK to Discharge: Yes (Pt evaluation complete and rehab recommendations made)      General Visit Information:   PT  Visit  PT Received On: 08/01/24  Response to Previous Treatment: Patient with no complaints from previous session.  General  Prior to Session Communication: Bedside nurse  Patient Position Received: Bed, 3 rail up, Alarm on  General Comment: pt supine in bed, RN cleared. cooperative with PT. Patient is a poor historian    Subjective   Precautions:  Precautions  Medical Precautions: Fall precautions      Objective   Pain:  Pain Assessment  Pain Assessment: 0-10  0-10 (Numeric) Pain Score:  (denies pain at rest, reports pain in R foot when touched)  Cognition:  Cognition  Overall Cognitive Status: Impaired  Orientation Level:  (AO x3; but reports she was standing last night, doing stairs at SNF,)    Postural Control:  Static Sitting Balance  Static Sitting-Level of Assistance:  (SBA-CGA)  Static Sitting-Comment/Number of Minutes: 15 min    Activity Tolerance:  Activity Tolerance  Endurance: Decreased tolerance for upright  activites  Treatments:  Therapeutic Activity  Therapeutic Activity Performed: Yes  Therapeutic Activity 1: pt completed bed mobility, static sat EOB for 15 min. stand attempted dependent    Outcome Measures:  [facility] Basic Mobility  Turning from your back to your side while in a flat bed without using bedrails: Total  Moving from lying on your back to sitting on the side of a flat bed without using bedrails: Total  Moving to and from bed to chair (including a wheelchair): Total  Standing up from a chair using your arms (e.g. wheelchair or bedside chair): Total  To walk in hospital room: Total  Climbing 3-5 steps with railing: Total  Basic Mobility - Total Score: 6    Education Documentation  Precautions, taught by Iris Aviles PT at 8/1/2024 12:43 PM.  Learner: Patient  Readiness: Acceptance  Method: Explanation  Response: Needs Reinforcement  Comment: edu on role of PT    Body Mechanics, taught by Iris Aviles PT at 8/1/2024 12:43 PM.  Learner: Patient  Readiness: Acceptance  Method: Explanation  Response: Needs Reinforcement  Comment: edu on role of PT    Mobility Training, taught by Iris Aviles PT at 8/1/2024 12:43 PM.  Learner: Patient  Readiness: Acceptance  Method: Explanation  Response: Needs Reinforcement  Comment: edu on role of PT    Education Comments  No comments found.        OP EDUCATION:       Encounter Problems       Encounter Problems (Active)       PT Problem       Pt. will perform bed mobility with mod A x 1  (Progressing)       Start:  07/30/24    Expected End:  08/13/24            Pt. will perform transfers with mod A x 2  (Progressing)       Start:  07/30/24    Expected End:  08/13/24            Pt. will require CGA for static/dynamic sitting balance to participate in ADLS  (Progressing)       Start:  07/30/24    Expected End:  08/13/24               Pain - Adult

## 2024-08-01 NOTE — CONSULTS
"Nutrition Initial Assessment:   Nutrition Assessment    Reason for Assessment: Admission nursing screening    Patient is a 63 y.o. 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 Patient was transferred from [facility] to [facility] for rheumatologic evaluation.     Nutrition History:  Energy Intake: Fair 50-75 %  Food and Nutrient History: RDN met with pt while she was eating lunch. Reports appetite is fair-- was drinking Glucerna at OSH and agreeable to drinking again. Was on PPN that was d/c'd 7/25, unable to have dobhoff placed due to epitaxis-- RDN at OSH had recommended a PEG. Per flow sheets has been consuming 25-75% of meals.  Food Allergies/Intolerances:  None  GI Symptoms: None  Oral Problems: Swallowing difficulty, worked with SLP at OSH     Anthropometrics:  Height: 161 cm (5' 3.39\")   Weight: 97.9 kg (215 lb 13.3 oz)   BMI (Calculated): 37.77  IBW/kg (Dietitian Calculated): 52.3 kg    Weight History:   Wt Readings from Last 25 Encounters:   07/30/24 97.9 kg (215 lb 13.3 oz)   07/29/24 94.9 kg (209 lb 3.5 oz)   07/12/24 94.3 kg (208 lb)   07/10/24 95.7 kg (211 lb)   07/09/24 95.7 kg (210 lb 15.7 oz)   06/26/24 95.7 kg (211 lb)   06/21/24 95.7 kg (211 lb)   06/04/24 95.3 kg (210 lb)   05/13/24 102 kg (224 lb 13.9 oz)   04/24/24 90.9 kg (200 lb 6.4 oz)   03/19/24 96.5 kg (212 lb 11.9 oz)   03/11/24 96.5 kg (212 lb 11.2 oz)   02/14/24 86.2 kg (190 lb)   02/08/24 87.5 kg (193 lb)   01/20/24 99.3 kg (218 lb 14.7 oz)   01/17/24 96 kg (211 lb 10.3 oz)   12/26/23 93 kg (205 lb)   12/18/23 93.4 kg (205 lb 14.6 oz)   11/30/23 105 kg (231 lb)   11/20/23 94.8 kg (208 lb 15.9 oz)   10/19/23 94.6 kg (208 lb 8.9 " oz)   09/14/23 94.3 kg (207 lb 12.8 oz)   08/01/23 95.3 kg (210 lb)   07/07/23 98.4 kg (217 lb)   05/20/23 90.7 kg (199 lb 15.4 oz)     Weight Change %: wt fluctuations noted       Nutrition Focused Physical Exam Findings:    Subcutaneous Fat Loss:   Orbital Fat Pads: Mild-Moderate (slight dark circles and slight hollowing)  Buccal Fat Pads: Mild-Moderate (flat cheeks, minimal bounce)  Triceps: Mild-Moderate (less than ample fat tissue)  Muscle Wasting:  Temporalis: Mild-Moderate (slight depression)  Pectoralis (Clavicular Region): Mild-Moderate (some protrusion of clavicle)  Deltoid/Trapezius: Mild-Moderate (slight protrusion of acromion process)  Interosseous: Well nourished (muscle bulges)  Edema:  Edema Location: non pitting BLE  Physical Findings:  Skin:  (stage 1 sacrum, area to buttocks and ankles)    Nutrition Significant Labs:  BG POCT trend:   Results from last 7 days   Lab Units 08/01/24  0856 08/01/24  0318 07/31/24  1643 07/31/24  1124 07/31/24  0733   POCT GLUCOSE mg/dL 115* 120* 179* 181* 130*    , Renal Lab Trend:   Results from last 7 days   Lab Units 08/01/24  1240 07/31/24  0545 07/30/24  1635 07/30/24  1223   POTASSIUM mmol/L 3.8 4.0 4.0 4.3   PHOSPHORUS mg/dL 4.2 3.8 3.7  --    SODIUM mmol/L 142 143 147* 147*   MAGNESIUM mg/dL  --  1.90  --   --    EGFR mL/min/1.73m*2 36* 42* 36* 36*   BUN mg/dL 30* 33* 35* 36*   CREATININE mg/dL 1.63* 1.41* 1.60* 1.60*        Nutrition Specific Medications:  Scheduled medications  furosemide, 40 mg, oral, Daily  insulin lispro, 0-5 Units, subcutaneous, TID  mycophenolate, 1,000 mg, oral, BID  pantoprazole, 40 mg, intravenous, Daily  polyethylene glycol, 17 g, oral, Daily    I/O:   Last BM Date: 07/31/24; Stool Appearance: Loose (07/31/24 6835)    Dietary Orders (From admission, onward)       Start     Ordered    07/30/24 1554  Adult diet Regular; Thin 0  Diet effective now        Question Answer Comment   Diet type Regular    Fluid consistency Thin 0         07/30/24 1553    07/30/24 0355  May Participate in Room Service With Assistance  Once        Question:  .  Answer:  Yes    07/30/24 0557                     Estimated Needs:   Total Energy Estimated Needs (kCal):  (8936-3971)  Method for Estimating Needs: 30-35 kcal/kg IBW  Total Protein Estimated Needs (g):  (70-80)  Method for Estimating Needs: 1.3.-1.5 g/kg IBW  Total Fluid Estimated Needs (mL):  (per team)      Nutrition Diagnosis   Malnutrition Diagnosis  Patient has Malnutrition Diagnosis: Yes  Diagnosis Status: New  Malnutrition Diagnosis: Moderate malnutrition related to chronic disease or condition  As Evidenced by: mild muscle loss and mild subcutaneous fat loss, oral intake meeting <75% estimated energy needs >1 month.      Nutrition Interventions/Recommendations         Nutrition Prescription:  Individualized Nutrition Prescription Provided for : diet + oral nutritional supplements        Nutrition Interventions:   Interventions: Meals and snacks, Medical food supplement  Goal: diet as tolerated  Medical Food Supplement: Commercial beverage  Goal: Glucerna TID (220 kcal, 10 g protein each)      Nutrition Monitoring and Evaluation   Food/Nutrient Related History Monitoring  Monitoring and Evaluation Plan: Energy intake  Energy Intake: Estimated energy intake  Criteria: meet >50% EEN      Biochemical Data, Medical Tests and Procedures  Monitoring and Evaluation Plan: Electrolyte/renal panel, Glucose/endocrine profile  Criteria: lytes WNL  Criteria: POC glucose <180 mg/dl      Time Spent (min): 45 minutes

## 2024-08-01 NOTE — PROGRESS NOTES
Transitional Care Coordination Progress Note:  Patient discussed during interdisciplinary rounds.  Team members present: RYAN FORDE  Plan per Medical/Surgical team: Per medical team AMS is improving, plan to follow up with Palliative Care team for final recs.  Payer: [employer]  Status: Inpatient  Discharge disposition: [facility].  Potential Barriers: none  ADOD: 8/2  Secure chat sent to direct submit team to initiate precert to [facility], facility updated. Previous assigned TCC confirmed with daughter Sandra she would like for pt to return to [facility]. Attempted to confirm with DEVANTE Dawn, call placed to [phone] (dead air), left voice mail message at 2nd contact number [phone] for a return call. Care coordinator will continue to follow for discharge planning needs.     Karlo Richards RN  Transitional Care Coordinator/TCC  [phone]

## 2024-08-01 NOTE — CARE PLAN
The patient's goals for the shift include        Problem: Pain - Adult  Goal: Verbalizes/displays adequate comfort level or baseline comfort level  Outcome: Progressing     Problem: Safety - Adult  Goal: Free from fall injury  Outcome: Progressing     Problem: Discharge Planning  Goal: Discharge to home or other facility with appropriate resources  Outcome: Progressing     Problem: Chronic Conditions and Co-morbidities  Goal: Patient's chronic conditions and co-morbidity symptoms are monitored and maintained or improved  Outcome: Progressing     Problem: Skin  Goal: Decreased wound size/increased tissue granulation at next dressing change  Outcome: Progressing  Flowsheets (Taken 8/1/2024 1347)  Decreased wound size/increased tissue granulation at next dressing change: Promote sleep for wound healing  Goal: Participates in plan/prevention/treatment measures  Outcome: Progressing  Flowsheets (Taken 8/1/2024 1347)  Participates in plan/prevention/treatment measures: Elevate heels  Goal: Prevent/manage excess moisture  Outcome: Progressing  Flowsheets (Taken 8/1/2024 1347)  Prevent/manage excess moisture: Moisturize dry skin  Goal: Prevent/minimize sheer/friction injuries  Outcome: Progressing  Flowsheets (Taken 8/1/2024 1347)  Prevent/minimize sheer/friction injuries: Use pull sheet  Goal: Promote/optimize nutrition  Outcome: Progressing  Flowsheets (Taken 8/1/2024 1347)  Promote/optimize nutrition: Consume > 50% meals/supplements  Goal: Promote skin healing  Outcome: Progressing  Flowsheets (Taken 8/1/2024 1347)  Promote skin healing: Turn/reposition every 2 hours/use positioning/transfer devices     Problem: Fall/Injury  Goal: Verbalize understanding of personal risk factors for fall in the hospital  Outcome: Progressing  Goal: Verbalize understanding of risk factor reduction measures to prevent injury from fall in the home  Outcome: Progressing  Goal: Use assistive devices by end of the shift  Outcome:  Progressing  Goal: Pace activities to prevent fatigue by end of the shift  Outcome: Progressing     Problem: Diabetes  Goal: Achieve decreasing blood glucose levels by end of shift  Outcome: Progressing  Goal: Increase stability of blood glucose readings by end of shift  Outcome: Progressing  Goal: Maintain electrolyte levels within acceptable range throughout shift  Outcome: Progressing  Goal: Maintain glucose levels >70mg/dl to <250mg/dl throughout shift  Outcome: Progressing  Goal: No changes in neurological exam by end of shift  Outcome: Progressing  Goal: Learn about and adhere to nutrition recommendations by end of shift  Outcome: Progressing  Goal: Vital signs within normal range for age by end of shift  Outcome: Progressing

## 2024-08-01 NOTE — PROGRESS NOTES
Bing Holliday is a 63 y.o. female on day 2 of admission presenting with Lupus (Multi).      Subjective   - NAEON    ECG from 7/30 evening notable for lateral lead (V4-V6) T wave inversions, persistent on repeat ECG on 7/31. Patient not endorsing chest pain, tightness, or other symptoms related to this. Some prior ECGs consistent with T wave inversions in these fields, but not persistent in all ECGs. Telemetry with persistent Afib overnight, as per baseline. No blood in stool per fecal occult blood test.    Palliative medicine saw patient yesterday, recommended melatonin, minimizing disturbances overnight, and noted some anxiety the patient endorsed with frequent returns to the hospital over the last several months. Palliative team at OSH set up services for patient's return to SNF which are a good plan moving forward for patient.     Labs:      Brief Plan:  Continue steroid dosing  Restart lasix 40mg every day  Started lovenox for DVT prophylaxis  Care placement pending       Objective     Last Recorded Vitals  /71   Pulse 70   Temp 36.3 °C (97.3 °F)   Resp 16   Wt 97.9 kg (215 lb 13.3 oz)   SpO2 96%   Intake/Output last 3 Shifts:    Intake/Output Summary (Last 24 hours) at 8/1/2024 0725  Last data filed at 7/31/2024 1608  Gross per 24 hour   Intake 100 ml   Output 300 ml   Net -200 ml       Admission Weight  Weight: 97.9 kg (215 lb 13.3 oz) (07/30/24 0147)    Daily Weight  07/30/24 : 97.9 kg (215 lb 13.3 oz)    Image Results  ECG 12 lead  Atrial fibrillation with rapid ventricular response  Right superior axis deviation  ST & T wave abnormality, consider lateral ischemia  Abnormal ECG  When compared with ECG of 26-JUL-2024 08:38,  QRS axis Shifted left  ST no longer depressed in Lateral leads  T wave inversion less evident in Inferior leads  T wave inversion now evident in Anterior leads      Physical Exam  Constitutional:       General: She is not in acute distress.     Appearance: Normal  appearance.   HENT:      Head: Normocephalic and atraumatic.      Right Ear: External ear normal.      Left Ear: External ear normal.      Nose: Nose normal.      Mouth/Throat:      Mouth: Mucous membranes are moist.   Eyes:      General: No scleral icterus.     Extraocular Movements: Extraocular movements intact.      Conjunctiva/sclera: Conjunctivae normal.      Pupils: Pupils are equal, round, and reactive to light.   Cardiovascular:      Rate and Rhythm: Normal rate and regular rhythm.      Pulses: Normal pulses.      Heart sounds: Normal heart sounds. No murmur heard.     No friction rub. No gallop.   Pulmonary:      Effort: Pulmonary effort is normal. No respiratory distress.      Breath sounds: Normal breath sounds.   Abdominal:      General: Abdomen is flat. Bowel sounds are normal. There is no distension.      Palpations: Abdomen is soft. There is no mass.      Tenderness: There is no abdominal tenderness.   Musculoskeletal:         General: No swelling or tenderness. Normal range of motion.      Right lower leg: No edema.      Left lower leg: No edema.   Skin:     General: Skin is warm and dry.      Capillary Refill: Capillary refill takes less than 2 seconds.   Neurological:      General: No focal deficit present.      Mental Status: She is alert. Mental status is at baseline.      Comments: Aox2-3, but sleepy on exam   Psychiatric:         Mood and Affect: Mood normal.         Behavior: Behavior normal.         Thought Content: Thought content normal.         Scheduled medications  atorvastatin, 40 mg, oral, Nightly  budesonide, 0.5 mg, nebulization, BID  hydrocortisone, 10 mg, oral, q PM  hydrocortisone, 20 mg, oral, q AM  insulin lispro, 0-5 Units, subcutaneous, TID  levETIRAcetam, 500 mg, oral, BID  melatonin, 6 mg, oral, Nightly  metoprolol tartrate, 25 mg, oral, BID  mycophenolate, 1,000 mg, oral, BID  pantoprazole, 40 mg, intravenous, Daily  polyethylene glycol, 17 g, oral, Daily      Continuous  medications     PRN medications  PRN medications: acetaminophen, dextrose, dextrose, glucagon, glucagon, ipratropium-albuteroL    Relevant Results  Results for orders placed or performed during the hospital encounter of 07/30/24 (from the past 24 hour(s))   POCT GLUCOSE   Result Value Ref Range    POCT Glucose 130 (H) 74 - 99 mg/dL   Occult Blood, Stool    Specimen: Stool   Result Value Ref Range    Occult Blood, Stool X1 Negative Negative   POCT GLUCOSE   Result Value Ref Range    POCT Glucose 181 (H) 74 - 99 mg/dL   POCT GLUCOSE   Result Value Ref Range    POCT Glucose 179 (H) 74 - 99 mg/dL   B-type natriuretic peptide   Result Value Ref Range     (H) 0 - 99 pg/mL   POCT GLUCOSE   Result Value Ref Range    POCT Glucose 120 (H) 74 - 99 mg/dL     *Note: Due to a large number of results and/or encounters for the requested time period, some results have not been displayed. A complete set of results can be found in Results Review.        Assessment/Plan      Bing Holliday is a 63 y.o. 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  The patient initially presented to OSH on 7/15 from her SNF with presumed septic shock 2/2 diverticulitis and adrenal insufficiency. OSH hospital course complicated by lower GI bleed 2/2 diverticulitis and epistaxis requiring 3 units pRBC and A-fib with RVR (HR 110s-130s).  Patient was transferred to [facility] for rheumatologic evaluation due to concerns of her presenting symptoms being secondary to her rheumatologic disease, but rheumatologic workup at OSH negative. She remains in the hospital for placement in SNF.     #Encephalopathy   - Patient oriented x 3; however  "appeared drowsy on physical exam.  Patient has had significant steroid exposure which could contribute to her mild confusion.  - CT head on 7/8 negative for acute intracranial abnormalities  - Will obtain labs to evaluate for electrolyte disturbances or metabolic derangements contributing to the patient's current mental status.  - Last B12 and folate wnl, RPR negative in 2022  Plan:  - Labs: CBC, CMP, Phos  - Patient passed swallow study. Diet: thin liquids and regular solids. Can crush pills if needed.     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  - patient's rheumatologist, Dr. Hoyos, in [place] on 7/18  - home meds: mycophenalte 1000 mg BID  Plan:  - continue home meds     #Recurrent adrenal insufficiency   - had >6 recurrent episodes of adrenal insuffiencey within the past year  - Pt was admitted to the MICU at OSH with encephalopathy and sepsis in the setting of diverticulitis and concern for adrenal insuffiencey where she was started on IV steroids.   - Pt was seen by endocrinology at OSH and transitioned to oral steroids on 7/29: hydrocortisone 20 mg QAM and 10 mg QPM   - Concerns of \"pill pocketing\" was reported by OSH endocrinologist.   Plan:  - c/w current regimen  - daily labs      #Afib with RVR   #HFmREF (EF 40-45% 5/2024)  #CAD s/p CABG 2013  - [id number] Score 4  - ECG on arrival notable for A-fib, heart rate 104. ECG overnight on 7/29 notable for Afib with lateral T wave inversions (V4-V6). Consistent with some prior ECGs, but not a persistent finding at baseline. No chest pain noted. .  - Heart rates in the 110-130s at OSH, patient was started on metoprolol tartrate 25 mg BID daily  - On further chart review, patient home eliquis frequently stopped and restarted 2/2 bleeding over past several months.  Plan:  - Stop heparin gtt. Eliquis home dose started in AM, but " discontinued after further chart review.  - Start lovenox for DVT prophylaxis  - Continue with metoprolol tartrate 25 mg twice daily  - continuous tele      #Placement/Social  - Palliative consulted today, will see patient to assist with recs. Palliative at OSH had discussion with patient's HCPOA (son) and set up plan for palliative care when returned to SNF.  Plan:  - Continue to work on placement with SW  - Follow palliative recs    #Diverticulitis   #Hematochezia, resolved  - CT AP at OSH was notable for diverticulitis of the hepatic flexure. GI was consulted due to diverticulitis who recommended continuing antibiotics, outpatient colonoscopy in 6 weeks and CTA to identify source of active bleed.  CTA was deferred due to patient severely reduced GFR.  - OSH course complicated by diverticular bleed, patient received 3 units PRBC during this time.   - Completed course of Zosyn (7/17 - 7/22) at OSH  - Some possible specks of blood noted overnight by nursing, Hgb stable with no changes to vitals. Fecal occult blood test negative.  Plan:  - CTM BMs  - outpatient colonoscopy  - daily CBC      #Hypernatremia   - likely secondary to poor PO intake due to AMS   - Na 147 on admission --> down to 143 on repeat RFP following fluids  Plan:  - Continue to monitor      #HTN   #HLD  #Hypotensive-- resolved  - home regimen: Atorvastatin 40 mg daily, furosemide 40 mg daily,   Plan:  - c/w home meds  - Restart home lasix 40mg every day with resolution of JED and hypotension     #CKD3b   # Lupus nephritis  - Cr 1.59, (bl Cr 1.5-1.9)  - OSH course complicated by JED on CKD secondary to ATN in the setting of septic shock and acute GI bleed.  Creatinine was 2.6 during this time  Plan:  - Renally dose meds  - Avoid nephrotoxic agents  - Restart home lasix 40 mg daily      #Thrombocytopenia, improving   #Chronic Anemia  - Hb 9.4, baseline 8-10, Hb stable at ~9 since 7/27  - OSH platelet count of 68 on admission, during this time her MMF  was held which has since been resumed. Platelet count improving throughout the course of admission.  - OSH course complicated by pancytopenia, diverticular bleed and epistasis, patient received 3 units PRBC during this time.  - No evidence of bleeding throughout [facility] admission. Hgb on CBC stable (9.4>8.5).    #DM Type 2  - last A1c 6.8 on 6/2024  - home regimen: lantus 10 units QAM, SSI  Plan:  - hold home meds  - SSI     #COPD  - Saturating well on room air  - home inhalers: Trelegy-Ellipta, albuterol-budesonide  Plan:  - c/w home inhalers  - prn DuoNeb     # History of seizures  Plan:  -Continue home Keppra 500 mg twice daily     # GERD  Plan:  - Continue home pantoprazole 40 mg daily     F: PRN  E: PRN  N: Regular solids and thin liquids. Pills may be crushed if pill-pocketing.  GI: pantoprazole 40mg every day  A: PIV  DVT ppx: lovenox     Code Status: DNR/DNI, no dialysis, but consents to invasive lines (reconfirmed with López Lang--son on 7/30)  Surrogate Medical Decision-maker: López Lang, son, [phone]    DALIA MATHIS, MS4

## 2024-08-02 VITALS
DIASTOLIC BLOOD PRESSURE: 68 MMHG | SYSTOLIC BLOOD PRESSURE: 99 MMHG | TEMPERATURE: 97 F | WEIGHT: 215.83 LBS | HEIGHT: 63 IN | HEART RATE: 75 BPM | RESPIRATION RATE: 16 BRPM | BODY MASS INDEX: 38.24 KG/M2 | OXYGEN SATURATION: 99 %

## 2024-08-02 LAB
APTT PPP: 36 SECONDS (ref 27–38)
ATRIAL RATE: 500 BPM
ATRIAL RATE: 73 BPM
ATRIAL RATE: 83 BPM
ATRIAL RATE: 94 BPM
GLUCOSE BLD MANUAL STRIP-MCNC: 162 MG/DL (ref 74–99)
GLUCOSE BLD MANUAL STRIP-MCNC: 168 MG/DL (ref 74–99)
GLUCOSE BLD MANUAL STRIP-MCNC: 99 MG/DL (ref 74–99)
HOLD SPECIMEN: NORMAL
INR PPP: 1.1 (ref 0.9–1.1)
PROTHROMBIN TIME: 12.8 SECONDS (ref 9.8–12.8)
Q ONSET: 215 MS
Q ONSET: 217 MS
Q ONSET: 219 MS
Q ONSET: 222 MS
QRS COUNT: 12 BEATS
QRS COUNT: 12 BEATS
QRS COUNT: 17 BEATS
QRS COUNT: 9 BEATS
QRS DURATION: 102 MS
QRS DURATION: 102 MS
QRS DURATION: 86 MS
QRS DURATION: 94 MS
QT INTERVAL: 300 MS
QT INTERVAL: 350 MS
QT INTERVAL: 430 MS
QT INTERVAL: 436 MS
QTC CALCULATION(BAZETT): 396 MS
QTC CALCULATION(BAZETT): 398 MS
QTC CALCULATION(BAZETT): 430 MS
QTC CALCULATION(BAZETT): 477 MS
QTC FREDERICIA: 362 MS
QTC FREDERICIA: 380 MS
QTC FREDERICIA: 430 MS
QTC FREDERICIA: 463 MS
R AXIS: -4 DEGREES
R AXIS: -5 DEGREES
R AXIS: 0 DEGREES
R AXIS: 3 DEGREES
T AXIS: -68 DEGREES
T AXIS: 178 DEGREES
T AXIS: 222 DEGREES
T AXIS: 258 DEGREES
T OFFSET: 367 MS
T OFFSET: 397 MS
T OFFSET: 430 MS
T OFFSET: 437 MS
VENTRICULAR RATE: 106 BPM
VENTRICULAR RATE: 60 BPM
VENTRICULAR RATE: 72 BPM
VENTRICULAR RATE: 77 BPM

## 2024-08-02 PROCEDURE — 2500000004 HC RX 250 GENERAL PHARMACY W/ HCPCS (ALT 636 FOR OP/ED)

## 2024-08-02 PROCEDURE — 2500000001 HC RX 250 WO HCPCS SELF ADMINISTERED DRUGS (ALT 637 FOR MEDICARE OP)

## 2024-08-02 PROCEDURE — 85610 PROTHROMBIN TIME: CPT

## 2024-08-02 PROCEDURE — 82947 ASSAY GLUCOSE BLOOD QUANT: CPT

## 2024-08-02 PROCEDURE — C9113 INJ PANTOPRAZOLE SODIUM, VIA: HCPCS

## 2024-08-02 PROCEDURE — 36415 COLL VENOUS BLD VENIPUNCTURE: CPT

## 2024-08-02 PROCEDURE — 2500000002 HC RX 250 W HCPCS SELF ADMINISTERED DRUGS (ALT 637 FOR MEDICARE OP, ALT 636 FOR OP/ED)

## 2024-08-02 PROCEDURE — 99239 HOSP IP/OBS DSCHRG MGMT >30: CPT | Performed by: INTERNAL MEDICINE

## 2024-08-02 RX ORDER — FUROSEMIDE 40 MG/1
40 TABLET ORAL DAILY
Qty: 30 TABLET | Refills: 3 | Status: SHIPPED | OUTPATIENT
Start: 2024-08-03

## 2024-08-02 RX ORDER — HYDROCORTISONE 20 MG/1
20 TABLET ORAL EVERY MORNING
Qty: 30 TABLET | Refills: 3 | Status: SHIPPED | OUTPATIENT
Start: 2024-08-03

## 2024-08-02 RX ORDER — HEPARIN SODIUM 5000 [USP'U]/ML
5000 INJECTION, SOLUTION INTRAVENOUS; SUBCUTANEOUS EVERY 8 HOURS
Qty: 1 ML | Refills: 3 | Status: SHIPPED | OUTPATIENT
Start: 2024-08-02 | End: 2024-08-02

## 2024-08-02 RX ORDER — HEPARIN SODIUM 5000 [USP'U]/ML
5000 INJECTION, SOLUTION INTRAVENOUS; SUBCUTANEOUS EVERY 8 HOURS
Qty: 90 ML | Refills: 11 | Status: SHIPPED | OUTPATIENT
Start: 2024-08-02 | End: 2025-08-02

## 2024-08-02 RX ORDER — HYDROCORTISONE 10 MG/1
10 TABLET ORAL EVERY EVENING
Qty: 30 TABLET | Refills: 3 | Status: SHIPPED | OUTPATIENT
Start: 2024-08-02

## 2024-08-02 ASSESSMENT — PAIN SCALES - GENERAL: PAINLEVEL_OUTOF10: 0 - NO PAIN

## 2024-08-02 ASSESSMENT — PAIN - FUNCTIONAL ASSESSMENT: PAIN_FUNCTIONAL_ASSESSMENT: 0-10

## 2024-08-02 NOTE — DISCHARGE SUMMARY
"Discharge Diagnosis  Lupus (Multi)    Issues Requiring Follow-Up  Follow palliative care recommendations  Follow up with cardiology referral outpatient  Follow up with rheumatologist outpatient    #Encephalopathy   Waxing, waning mental status at baseline. Overall doing better, Aox3 on discharge.    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 Crush pills if needed.     #Afib with RVR   #HFmREF (EF 40-45% 5/2024)  #CAD s/p CABG 2013  - [id number] Score 4  - ECG on arrival notable for A-fib, heart rate 104. ECG overnight on 7/29 notable for Afib with lateral T wave inversions (V4-V6). Consistent with some prior ECGs, but not a persistent finding at baseline. No chest pain noted.   - Discontinued Eliquis due to recurrent bleeding over last 6 months. Risk/benefit does not indicate need for eliquis.   - Discontinued metoprolol tartrate 25mg BID     #Diverticulitis   #Hematochezia, resolved  - Completed course of Zosyn (7/17 - 7/22) at OSH  - Resolved, Hgb stable  - Outpatient colonoscopy needed     #Hypernatremia   - Resolved      #HTN   #HLD  #Hypotensive-- resolved  - home regimen: Atorvastatin 40 mg daily, furosemide 40 mg daily,      #CKD3b   # Lupus nephritis  - bl Cr 1.5-1.9. JED resolved.  Plan:  - Renally dose meds  - Avoid nephrotoxic agents  - Continue home lasix 40 mg daily      #Thrombocytopenia, improving   #Chronic Anemia  - Acute issues resolved. Started heparin for DVT prophylaxis.     #DM Type 2   - home regimen: lantus 10 units QAM, SSI     #COPD  - home inhalers: Trelegy-Ellipta, albuterol-budesonide     # History of seizures  -Continue home Keppra 500 mg twice daily     # GERD  - Continue home pantoprazole 40 mg daily    Discharge Meds     Your medication list        START taking these medications        Instructions Last Dose Given Next " Dose Due   furosemide 40 mg tablet  Commonly known as: Lasix  Start taking on: August 3, 2024      Take 1 tablet (40 mg) by mouth once daily.       heparin (porcine) 5,000 unit/mL injection      Inject 1 mL (5,000 Units) under the skin every 8 hours.              CHANGE how you take these medications        Instructions Last Dose Given Next Dose Due   hydrocortisone 10 mg tablet  Commonly known as: Cortef  What changed:   medication strength  how much to take  when to take this      Take 1 tablet (10 mg) by mouth once daily in the evening.       hydrocortisone 20 mg tablet  Commonly known as: Cortef  Start taking on: August 3, 2024  What changed: You were already taking a medication with the same name, and this prescription was added. Make sure you understand how and when to take each.      Take 1 tablet (20 mg) by mouth once daily in the morning.       ipratropium-albuteroL 0.5-2.5 mg/3 mL nebulizer solution  Commonly known as: Duo-Neb  What changed: Another medication with the same name was removed. Continue taking this medication, and follow the directions you see here.      Take 3 mL by nebulization every 4 hours if needed for wheezing or shortness of breath.       pantoprazole 40 mg EC tablet  Commonly known as: ProtoNix  What changed: Another medication with the same name was removed. Continue taking this medication, and follow the directions you see here.      Take 1 tablet (40 mg) by mouth once daily in the morning. Take before meals. Do not crush, chew, or split.              CONTINUE taking these medications        Instructions Last Dose Given Next Dose Due   atorvastatin 40 mg tablet  Commonly known as: Lipitor      Take 1 tablet (40 mg) by mouth once daily at bedtime.       budesonide 0.5 mg/2 mL nebulizer solution  Commonly known as: Pulmicort      Take 2 mL (0.5 mg) by nebulization 2 times a day. Rinse mouth with water after use to reduce aftertaste and incidence of candidiasis. Do not swallow.      "  Dexcom G6  misc  Generic drug: blood-glucose meter,continuous      Use as instructed       Dexcom G6 Sensor device  Generic drug: blood-glucose sensor      Use to check sugars 3 times daily       Dexcom G6 Transmitter device  Generic drug: blood-glucose transmitter device      Use as instructed       insulin lispro 100 unit/mL injection  Commonly known as: HumaLOG           levETIRAcetam 500 mg tablet  Commonly known as: Keppra      Take 1 tablet (500 mg) by mouth 2 times a day.       melatonin 5 mg tablet      Take 1 tablet (5 mg) by mouth once daily at bedtime.       metoprolol tartrate 25 mg tablet  Commonly known as: Lopressor      Take 0.25 tablets (6.25 mg) by mouth 2 times a day.       mycophenolate 250 mg capsule  Commonly known as: Cellcept      Take 4 capsules (1,000 mg) by mouth 2 times a day.       pen needle, diabetic 31 gauge x 5/16\" needle      Use to inject 1-4 times daily as directed.       polyethylene glycol 17 gram packet  Commonly known as: Glycolax, Miralax      Take 17 g by mouth once daily.              STOP taking these medications      Adult Clinimix Parenteral Nutrition Continuous        dextrose 50 % injection        glucagon 1 mg injection  Commonly known as: Glucagen        hydrocortisone sod succ (PF) 100 mg/2 mL injection  Commonly known as: Solu-CORTEF        levETIRAcetam 500 mg/100 mL IV  Commonly known as: Keppra        piperacillin-tazobactam 3.375 gram/50 mL IV  Commonly known as: Zosyn        risperiDONE 3 mg tablet  Commonly known as: RisperDAL                  Where to Get Your Medications        These medications were sent to [facility]  [address], [address], [place] [address]      Hours: 8AM to 6PM Mon-Fri, 8AM to 4PM Sat, 9AM to 1PM Sun Phone: [phone]   furosemide 40 mg tablet  heparin (porcine) 5,000 unit/mL injection  hydrocortisone 10 mg tablet  hydrocortisone 20 mg tablet         Test Results Pending At Discharge  Pending " Labs       No current pending labs.            Hospital Course  63 y.o. 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  Patient was transferred from [facility] to [facility] for rheumatologic evaluation. The patient initially presented to OSH on 7/15 from her SNF 2/2 AMS, hypotension, hypothermia, and hypoglycemic, concerning for presumed septic shock secondary to diverticulitis and adrenal insufficiency. OSH hospital course complicated by lower GI bleed 2/2 diverticulitis and epistaxis requiring 3 units pRBC and A-fib with RVR (HR 110s-130s). Eliquis was stopped at OSH 2/2 bleeding and thrombocytopenia in shock state. Patient was transferred to [facility] for rheumatologic evaluation due to concerns of her presenting symptoms being secondary to her rheumatologic disease.     Workup for SLE flare (MONTSERRAT, C3/C4, dsDNA) at OSH negative, and no LP or MRI performed. On exam, patient had normal cognitive function with waxing and waning delirium noted to be at baseline in acute period following adrenal insufficiency per son. No concern for SLE cerebritis, rheumatology consult held in light of this. Delirium likely multifactorial. Eliquis resumed (7/30). Patient continued to be in Afib with RVR throughout admission, but is stable. Eliquis stopped at OSH and not restarted given prolonged course of intermittent starting and stopping of Eliquis over past 6 months 2/2 bleeding. Risks do not outweigh benefits of keeping Eliquis. ECG notable for Afib with RVR and lateral T wave inversions (V4-V6) (7/30), sometimes notable on prior ECGs as well. RFP on admission notable for hypernatremia (Na 147) refractory to LR bolus on repeat RFP,  but resolved with D5W gtt. Palliative care consulted, and confirmed good outpatient plan for patient at SNF with [facility], home palliative care program.     To Do:  Follow palliative care recommendations  Follow up with cardiology referral outpatient  Follow up with rheumatologist outpatient  Outpatient colonoscopy needed for evaluation of diverticulitis    Pertinent Physical Exam At Time of Discharge  Physical Exam  Constitutional:       General: She is not in acute distress.     Appearance: Normal appearance.   HENT:      Head: Normocephalic and atraumatic.      Right Ear: External ear normal.      Left Ear: External ear normal.      Nose: Nose normal.      Mouth/Throat:      Mouth: Mucous membranes are moist.   Eyes:      General: No scleral icterus.     Extraocular Movements: Extraocular movements intact.      Conjunctiva/sclera: Conjunctivae normal.      Pupils: Pupils are equal, round, and reactive to light.   Cardiovascular:      Rate and Rhythm: Normal rate and regular rhythm.      Pulses: Normal pulses.      Heart sounds: Normal heart sounds. No murmur heard.     No friction rub. No gallop.   Pulmonary:      Effort: Pulmonary effort is normal. No respiratory distress.      Breath sounds: Normal breath sounds.   Abdominal:      General: Abdomen is flat. Bowel sounds are normal. There is no distension.      Palpations: Abdomen is soft. There is no mass.      Tenderness: There is no abdominal tenderness.   Musculoskeletal:         General: No swelling or tenderness. Normal range of motion.      Right lower leg: No edema.      Left lower leg: No edema.   Skin:     General: Skin is warm and dry.      Capillary Refill: Capillary refill takes less than 2 seconds.   Neurological:      General: No focal deficit present.      Mental Status: She is alert. Mental status is at baseline.      Comments: Aox2-3, but sleepy on exam   Psychiatric:         Mood and Affect: Mood normal.         Behavior: Behavior normal.          Thought Content: Thought content normal.     Outpatient Follow-Up  Future Appointments   Date Time Provider Department Center   8/13/2024 10:30 AM Michael Arenas MD [id number] [place]   8/21/2024  1:20 PM [facility] [address]   8/21/2024  2:15 PM Antonio Rodriges MD [id number] [place]         DALIA MATHIS

## 2024-08-02 NOTE — PROGRESS NOTES
Discharge Transfer to Facility  Patient will discharge today to: SNF  Facility name: [facility]  Facility phone number: [phone]   [facility] [place] aware.  Bedside nurse (name) aware to call report: Paula  Phone number for report: [phone] [address] nurse  Ambulance transport has been arranged.  Ambulance Company name: [facility]  Ambulance Company phone number: [phone]   time: 5:30pm  POA/ son López [phone] aware of transport time.  Patient has been approved for discharge after 8pm: n/a  Direct submit team confirmed auth to [facility] SNF has been approved. Medical team and facility updated on transport time. Unit secretary provided with blue transfer slip. Discharge orders and summary sent to facility via [facility]. Care coordinator will continue to follow for discharge planning needs.    Karlo Richards RN  Transitional Care Coordinator/[facility]  [phone]

## 2024-08-02 NOTE — PROGRESS NOTES
Chart reviewed.  Pt appears to be returning to her nursing facility today, so SW called her home palliative care program, [facility], to inform them so that they can follow up with her at her SNF.        Tsering Morse, RAHEELW

## 2024-08-02 NOTE — PROGRESS NOTES
Art Therapy Note    Bing Holliday     Therapy Session  Referral Type: New referral this admission  Visit Type: New visit  Session Start Time: 1636  Session End Time: 1637  Intervention Delivery: In-person  Family Present for Session: None              Treatment/Interventions  Art Therapy Interventions: Assessment    Post-assessment  Total Session Time (min): 1 minutes    Narrative  Assessment Detail: Pt was lying flat in bed, covered with a blanket and lying still when ATR visited to introduce self, services and benefits of AT. Pt spoke very softly and appeared lethargic. ATR left information with her at bedside and was encouraged to stop in again another day.  Follow-up: ATR will f/u with pt another day, provided she remains admitted.    Education Documentation  No documentation found.

## 2024-08-02 NOTE — DISCHARGE INSTRUCTIONS
Dear Ms. Holliday,    You initially presented to [facility] on 7/15 from your nursing home due to low blood pressure, low body temperature, and confusion which was concerning for sepsis, along with lower intestinal pain/bleeding, and another bout of adrenal insufficiency. After being there for approximately 2 weeks, you required 3 units of blood for extensive lower intestinal bleeding, and your Eliquis was stopped due to low platelets. You were admitted to [facility] as a transfer from [facility] on 7/30 for rheumatologic workup since the physicians at [facility] thought that your symptoms were a result of your Lupus. Review of the labs from [place] revealed no evidence of a lupus flare, and you were given supportive care prior to discharging back to the nursing home. Given your recent bleeding episodes over the past 6 months, we stopped your blood thinner at this time, and instead we prescribed heparin for prevention of clot formation in your legs.    The palliative care teams saw you at [facility], and at [facility]. Both teams have set you up with a good plan for palliative care moving forward.    MEDICATION CHANGES:      Your medication list        START taking these medications        Instructions Last Dose Given Next Dose Due   furosemide 40 mg tablet  Commonly known as: Lasix  Start taking on: August 3, 2024      Take 1 tablet (40 mg) by mouth once daily.       heparin (porcine) 5,000 unit/mL injection      Inject 1 mL (5,000 Units) under the skin every 8 hours.              CHANGE how you take these medications        Instructions Last Dose Given Next Dose Due   hydrocortisone 10 mg tablet  Commonly known as: Cortef  What changed:   medication strength  how much to take  when to take this      Take 1 tablet (10 mg) by mouth once daily in the evening.       hydrocortisone 20 mg tablet  Commonly known as: Cortef  Start taking on: August 3, 2024  What changed: You were already taking  a medication with the same name, and this prescription was added. Make sure you understand how and when to take each.      Take 1 tablet (20 mg) by mouth once daily in the morning.       ipratropium-albuteroL 0.5-2.5 mg/3 mL nebulizer solution  Commonly known as: Duo-Neb  What changed: Another medication with the same name was removed. Continue taking this medication, and follow the directions you see here.      Take 3 mL by nebulization every 4 hours if needed for wheezing or shortness of breath.       pantoprazole 40 mg EC tablet  Commonly known as: ProtoNix  What changed: Another medication with the same name was removed. Continue taking this medication, and follow the directions you see here.      Take 1 tablet (40 mg) by mouth once daily in the morning. Take before meals. Do not crush, chew, or split.              CONTINUE taking these medications        Instructions Last Dose Given Next Dose Due   atorvastatin 40 mg tablet  Commonly known as: Lipitor      Take 1 tablet (40 mg) by mouth once daily at bedtime.       budesonide 0.5 mg/2 mL nebulizer solution  Commonly known as: Pulmicort      Take 2 mL (0.5 mg) by nebulization 2 times a day. Rinse mouth with water after use to reduce aftertaste and incidence of candidiasis. Do not swallow.       Dexcom G6  misc  Generic drug: blood-glucose meter,continuous      Use as instructed       Dexcom G6 Sensor device  Generic drug: blood-glucose sensor      Use to check sugars 3 times daily       Dexcom G6 Transmitter device  Generic drug: blood-glucose transmitter device      Use as instructed       insulin lispro 100 unit/mL injection  Commonly known as: HumaLOG           levETIRAcetam 500 mg tablet  Commonly known as: Keppra      Take 1 tablet (500 mg) by mouth 2 times a day.       melatonin 5 mg tablet      Take 1 tablet (5 mg) by mouth once daily at bedtime.       metoprolol tartrate 25 mg tablet  Commonly known as: Lopressor      Take 0.25 tablets (6.25 mg)  "by mouth 2 times a day.       mycophenolate 250 mg capsule  Commonly known as: Cellcept      Take 4 capsules (1,000 mg) by mouth 2 times a day.       pen needle, diabetic 31 gauge x 5/16\" needle      Use to inject 1-4 times daily as directed.       polyethylene glycol 17 gram packet  Commonly known as: Glycolax, Miralax      Take 17 g by mouth once daily.              STOP taking these medications      Adult Clinimix Parenteral Nutrition Continuous        dextrose 50 % injection        glucagon 1 mg injection  Commonly known as: Glucagen        hydrocortisone sod succ (PF) 100 mg/2 mL injection  Commonly known as: Solu-CORTEF        levETIRAcetam 500 mg/100 mL IV  Commonly known as: Keppra        piperacillin-tazobactam 3.375 gram/50 mL IV  Commonly known as: Zosyn        risperiDONE 3 mg tablet  Commonly known as: RisperDAL                  Where to Get Your Medications        These medications were sent to [facility]  [address], [address], [place] [address]      Hours: 8AM to 6PM Mon-Fri, 8AM to 4PM Sat, 9AM to 1PM Sun Phone: [phone]   furosemide 40 mg tablet  heparin (porcine) 5,000 unit/mL injection  hydrocortisone 10 mg tablet  hydrocortisone 20 mg tablet         FOLLOW UP APPOINTMENTS:  - Please follow up with rheumatology and for an outpatient colonoscopy, referrals were placed.  - Please follow up with your primary care provider within 1-2 weeks.  - Please follow up with scheduled cardiology appointments later this month.    You should get a phone call after you leave to schedule all of these referral appointments. However, if you do not receive a call within 3 days, please call [phone].     Future Appointments   Date Time Provider Department Center   8/13/2024 10:30 AM Michael Areans MD [id number] [place]   8/21/2024  1:20 PM [facility] ELYNIC1 [place]   8/21/2024  2:15 PM Antonio Rodriges MD [id number] [place]       It has been a pleasure taking care " of you. We wish you a very speedy recovery!    Your  Internal Medicine Team  [facility]  Department of Internal Medicine

## 2024-08-02 NOTE — NURSING NOTE
Discharge Note  Patient remained safe and stable on this shift. Patient is discharge to [facility] . IV removed. Montano catheter was also removed. Report given receiving nurse at [facility].  Patient was picked up on a stretcher by [facility]. Patient left with her phone.

## 2024-08-03 LAB
ATRIAL RATE: 131 BPM
ATRIAL RATE: 84 BPM
P AXIS: 40 DEGREES
P OFFSET: 210 MS
P ONSET: 150 MS
PR INTERVAL: 136 MS
Q ONSET: 217 MS
Q ONSET: 218 MS
QRS COUNT: 14 BEATS
QRS COUNT: 22 BEATS
QRS DURATION: 94 MS
QRS DURATION: 96 MS
QT INTERVAL: 282 MS
QT INTERVAL: 370 MS
QTC CALCULATION(BAZETT): 423 MS
QTC CALCULATION(BAZETT): 437 MS
QTC FREDERICIA: 369 MS
QTC FREDERICIA: 413 MS
R AXIS: -2 DEGREES
R AXIS: -5 DEGREES
T AXIS: 187 DEGREES
T AXIS: 260 DEGREES
T OFFSET: 358 MS
T OFFSET: 403 MS
VENTRICULAR RATE: 135 BPM
VENTRICULAR RATE: 84 BPM

## 2024-08-09 ENCOUNTER — NURSING HOME VISIT (OUTPATIENT)
Dept: POST ACUTE CARE | Facility: EXTERNAL LOCATION | Age: 64
End: 2024-08-09
Payer: MEDICARE

## 2024-08-09 VITALS
TEMPERATURE: 97 F | OXYGEN SATURATION: 96 % | SYSTOLIC BLOOD PRESSURE: 103 MMHG | WEIGHT: 211 LBS | HEART RATE: 69 BPM | RESPIRATION RATE: 17 BRPM | BODY MASS INDEX: 36.92 KG/M2 | DIASTOLIC BLOOD PRESSURE: 65 MMHG

## 2024-08-09 DIAGNOSIS — M05.741 RHEUMATOID ARTHRITIS INVOLVING BOTH HANDS WITH POSITIVE RHEUMATOID FACTOR (MULTI): ICD-10-CM

## 2024-08-09 DIAGNOSIS — I63.9 CEREBROVASCULAR ACCIDENT (CVA), UNSPECIFIED MECHANISM (MULTI): ICD-10-CM

## 2024-08-09 DIAGNOSIS — M35.1 MIXED CONNECTIVE TISSUE DISEASE (MULTI): Primary | ICD-10-CM

## 2024-08-09 DIAGNOSIS — R26.2 AMBULATORY DYSFUNCTION: ICD-10-CM

## 2024-08-09 DIAGNOSIS — R53.82 CHRONIC FATIGUE: ICD-10-CM

## 2024-08-09 DIAGNOSIS — J44.9 CHRONIC OBSTRUCTIVE PULMONARY DISEASE, UNSPECIFIED COPD TYPE (MULTI): ICD-10-CM

## 2024-08-09 DIAGNOSIS — M05.742 RHEUMATOID ARTHRITIS INVOLVING BOTH HANDS WITH POSITIVE RHEUMATOID FACTOR (MULTI): ICD-10-CM

## 2024-08-09 DIAGNOSIS — E11.42 DM TYPE 2 WITH DIABETIC PERIPHERAL NEUROPATHY (MULTI): Chronic | ICD-10-CM

## 2024-08-09 DIAGNOSIS — N18.32 STAGE 3B CHRONIC KIDNEY DISEASE (MULTI): ICD-10-CM

## 2024-08-09 DIAGNOSIS — M32.9 SYSTEMIC LUPUS ERYTHEMATOSUS, UNSPECIFIED SLE TYPE, UNSPECIFIED ORGAN INVOLVEMENT STATUS (MULTI): Chronic | ICD-10-CM

## 2024-08-09 DIAGNOSIS — D46.9 MYELODYSPLASTIC SYNDROME, UNSPECIFIED (MULTI): ICD-10-CM

## 2024-08-09 DIAGNOSIS — I42.9 CARDIOMYOPATHY, UNSPECIFIED TYPE (MULTI): ICD-10-CM

## 2024-08-09 DIAGNOSIS — R56.9 SEIZURE (MULTI): ICD-10-CM

## 2024-08-09 PROCEDURE — 99306 1ST NF CARE HIGH MDM 50: CPT | Performed by: INTERNAL MEDICINE

## 2024-08-09 NOTE — LETTER
Patient: Bing Holliday  : 1960    Encounter Date: 2024    Subjective  Patient ID: Bing Holliday is a 63 y.o. female who is acute skilled care being seen and evaluated for multiple medical problems.    HPI   This 63-year-old female patient with history of systemic lupus erythematosus with cerebritis and acute arthropathy, adrenal insufficiency, chronic kidney disease stage IIIb, COPD, heart failure with moderately reduced ejection fraction with ejection fraction 40 to 45%, atrial fibrillation not on anticoagulation due to low platelets, sinus node dysfunction, pacemaker, coronary artery disease, coronary bypass grafting, abdominal aortic aneurysm, diabetes mellitus type 2, and gastroesophageal reflux disease.  The patient returns from hospitalization for hypotension, hypothermia, confusion, adrenal crisis, and lower GI bleed requiring 3 units of packed red blood cells.  Eliquis was discontinued due to the lower GI bleed in the setting of thrombocytopenia.  [facility] to questionable acute lupus flare causing these issues.  Laboratory examinations did not confirm this.  The patient was initiated on subcutaneous heparin for DVT prophylaxis and was summarily transferred back to the extended care facility for ongoing long-term care.  The patient is resting quietly in bed but appears to exhibit some hyperesthesia which may or may not be related to underlying psychiatric challenges.  When I lightly touch the patient's arm she recoils and cries out.  She otherwise shakes her head no to questions of pain or shortness of breath.    Current high risk medication:  Tylenol  Lipitor  Lasix  Heparin  Hydrocortisone  Insulin  DuoNeb  Keppra  Melatonin  Metoprolol tartrate  MiraLAX  CellCept  Protonix  Pulmicort    Laboratory examination from 2024:  Potassium 3.8  Bicarbonate 22  Creatinine 1.63  GFR 36  Albumin 2.7  White blood cell  4.9  Hemoglobin 8.2  Platelet 111  Laboratory examination from July 31, 2024:    Occult blood in stool negative  Laboratory examination from July 22, 2024:  Anti-double-stranded DNA negative  Lupus anticoagulant negative  C3/C4 normal  C-reactive protein minimally elevated at 2.57  Sed rate 2    Review of Systems   Constitutional:  Positive for activity change, appetite change and fatigue. Negative for chills, diaphoresis and fever.   Respiratory:  Negative for cough and shortness of breath.    Cardiovascular:  Negative for chest pain and leg swelling.   Gastrointestinal:  Negative for constipation, diarrhea, nausea and vomiting.   Musculoskeletal:  Negative for joint swelling and myalgias.   Skin:  Positive for wound.   Psychiatric/Behavioral:  Positive for confusion (mild, reorients easily).        Objective  /65   Pulse 69   Temp 36.1 °C (97 °F)   Resp 17   Wt 95.7 kg (211 lb)   LMP  (LMP Unknown)   SpO2 96%   BMI 36.92 kg/m²     Physical Exam  Vitals reviewed.   Constitutional:       General: She is not in acute distress.     Appearance: She is ill-appearing. She is not toxic-appearing.   Cardiovascular:      Rate and Rhythm: Normal rate and regular rhythm.      Pulses: Normal pulses.      Heart sounds:      No gallop.   Pulmonary:      Breath sounds: Normal breath sounds. No wheezing, rhonchi or rales.   Abdominal:      General: Abdomen is flat. Bowel sounds are normal.      Palpations: Abdomen is soft.      Tenderness: There is no guarding or rebound.   Musculoskeletal:      Right lower leg: Edema present.      Left lower leg: Edema present.   Skin:     Comments: Patient has a lower extremity venous stasis ulcer currently covered and dry   Neurological:      Motor: Weakness present.      Coordination: Coordination abnormal.      Comments: mildly confused         Assessment/Plan  Problem List Items Addressed This Visit             ICD-10-CM    Ambulatory dysfunction R26.2    Myelodysplastic  syndrome, unspecified (Multi) D46.9    Cardiomyopathy (Multi) I42.9    Chronic fatigue R53.82    Chronic kidney disease (CKD), stage III (moderate) (Multi) N18.30    CVA (cerebral vascular accident) (Multi) I63.9    DM type 2 with diabetic peripheral neuropathy (Multi) (Chronic) E11.42    Systemic lupus erythematosus (Multi) (Chronic) M32.9    Seizure (Multi) R56.9    COPD (chronic obstructive pulmonary disease) (Multi) J44.9    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) M05.741, M05.742    Mixed connective tissue disease (Multi) - Primary M35.1     8.  We will continue with rehabilitative restorative and supportive care as the patient tolerates however I anticipate she will tolerate such things poorly.    B.  Laboratory examinations will continue to be monitored on an ongoing as-needed basis    C.  Per laboratory data that I can find it does not appear that the patient was having an acute flare of her vasculitic issues as contributing to her hospital presentation at this particular time.  Furthermore I would be extremely unusual for the patient to have systemic lupus erythematosus with an absence of anti-double-stranded DNA.  Given the patient's clinical appearance and her history of BMI pain and the patient is more likely having long-term consequences of mixed connective tissue disorder due to the rather diffuse nature of her autoimmune and vasculitic conditions.    D.  The patient's prognosis is unfortunately poor and should she and her family wish to move in the direction of palliative care and/or hospice she would be considered an excellent candidate for the services.        Electronically Signed By: Jose Mcginnis MD   8/20/24 10:20 AM

## 2024-08-09 NOTE — PROGRESS NOTES
Subjective   Patient ID: Bing Holliday is a 63 y.o. female who is acute skilled care being seen and evaluated for multiple medical problems.    HPI   This 63-year-old female patient with history of systemic lupus erythematosus with cerebritis and acute arthropathy, adrenal insufficiency, chronic kidney disease stage IIIb, COPD, heart failure with moderately reduced ejection fraction with ejection fraction 40 to 45%, atrial fibrillation not on anticoagulation due to low platelets, sinus node dysfunction, pacemaker, coronary artery disease, coronary bypass grafting, abdominal aortic aneurysm, diabetes mellitus type 2, and gastroesophageal reflux disease.  The patient returns from hospitalization for hypotension, hypothermia, confusion, adrenal crisis, and lower GI bleed requiring 3 units of packed red blood cells.  Eliquis was discontinued due to the lower GI bleed in the setting of thrombocytopenia.  The patient was [facility] due to questionable acute lupus flare causing these issues.  Laboratory examinations did not confirm this.  The patient was initiated on subcutaneous heparin for DVT prophylaxis and was summarily transferred back to the extended care facility for ongoing long-term care.  The patient is resting quietly in bed but appears to exhibit some hyperesthesia which may or may not be related to underlying psychiatric challenges.  When I lightly touch the patient's arm she recoils and cries out.  She otherwise shakes her head no to questions of pain or shortness of breath.    Current high risk medication:  Tylenol  Lipitor  Lasix  Heparin  Hydrocortisone  Insulin  DuoNeb  Keppra  Melatonin  Metoprolol tartrate  MiraLAX  CellCept  Protonix  Pulmicort    Laboratory examination from August 1, 2024:  Potassium 3.8  Bicarbonate 22  Creatinine 1.63  GFR 36  Albumin 2.7  White blood cell 4.9  Hemoglobin 8.2  Platelet 111  Laboratory examination from July 31, 2024:  BNP  896  Occult blood in stool negative  Laboratory examination from July 22, 2024:  Anti-double-stranded DNA negative  Lupus anticoagulant negative  C3/C4 normal  C-reactive protein minimally elevated at 2.57  Sed rate 2    Review of Systems   Constitutional:  Positive for activity change, appetite change and fatigue. Negative for chills, diaphoresis and fever.   Respiratory:  Negative for cough and shortness of breath.    Cardiovascular:  Negative for chest pain and leg swelling.   Gastrointestinal:  Negative for constipation, diarrhea, nausea and vomiting.   Musculoskeletal:  Negative for joint swelling and myalgias.   Skin:  Positive for wound.   Psychiatric/Behavioral:  Positive for confusion (mild, reorients easily).        Objective   /65   Pulse 69   Temp 36.1 °C (97 °F)   Resp 17   Wt 95.7 kg (211 lb)   LMP  (LMP Unknown)   SpO2 96%   BMI 36.92 kg/m²     Physical Exam  Vitals reviewed.   Constitutional:       General: She is not in acute distress.     Appearance: She is ill-appearing. She is not toxic-appearing.   Cardiovascular:      Rate and Rhythm: Normal rate and regular rhythm.      Pulses: Normal pulses.      Heart sounds:      No gallop.   Pulmonary:      Breath sounds: Normal breath sounds. No wheezing, rhonchi or rales.   Abdominal:      General: Abdomen is flat. Bowel sounds are normal.      Palpations: Abdomen is soft.      Tenderness: There is no guarding or rebound.   Musculoskeletal:      Right lower leg: Edema present.      Left lower leg: Edema present.   Skin:     Comments: Patient has a lower extremity venous stasis ulcer currently covered and dry   Neurological:      Motor: Weakness present.      Coordination: Coordination abnormal.      Comments: mildly confused         Assessment/Plan   Problem List Items Addressed This Visit             ICD-10-CM    Ambulatory dysfunction R26.2    Myelodysplastic syndrome, unspecified (Multi) D46.9    Cardiomyopathy (Multi) I42.9    Chronic  fatigue R53.82    Chronic kidney disease (CKD), stage III (moderate) (Multi) N18.30    CVA (cerebral vascular accident) (Multi) I63.9    DM type 2 with diabetic peripheral neuropathy (Multi) (Chronic) E11.42    Systemic lupus erythematosus (Multi) (Chronic) M32.9    Seizure (Multi) R56.9    COPD (chronic obstructive pulmonary disease) (Multi) J44.9    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) M05.741, M05.742    Mixed connective tissue disease (Multi) - Primary M35.1     8.  We will continue with rehabilitative restorative and supportive care as the patient tolerates however I anticipate she will tolerate such things poorly.    B.  Laboratory examinations will continue to be monitored on an ongoing as-needed basis    C.  Per laboratory data that I can find it does not appear that the patient was having an acute flare of her vasculitic issues as contributing to her hospital presentation at this particular time.  Furthermore I would be extremely unusual for the patient to have systemic lupus erythematosus with an absence of anti-double-stranded DNA.  Given the patient's clinical appearance and her history of BMI pain and the patient is more likely having long-term consequences of mixed connective tissue disorder due to the rather diffuse nature of her autoimmune and vasculitic conditions.    D.  The patient's prognosis is unfortunately poor and should she and her family wish to move in the direction of palliative care and/or hospice she would be considered an excellent candidate for the services.

## 2024-08-12 ENCOUNTER — TELEPHONE (OUTPATIENT)
Dept: GASTROENTEROLOGY | Facility: CLINIC | Age: 64
End: 2024-08-12
Payer: MEDICARE

## 2024-08-13 ENCOUNTER — APPOINTMENT (OUTPATIENT)
Dept: CARDIOLOGY | Facility: CLINIC | Age: 64
End: 2024-08-13
Payer: MEDICARE

## 2024-08-13 LAB
ATRIAL RATE: 115 BPM
ATRIAL RATE: 340 BPM
Q ONSET: 214 MS
Q ONSET: 216 MS
QRS COUNT: 13 BEATS
QRS COUNT: 16 BEATS
QRS DURATION: 92 MS
QRS DURATION: 94 MS
QT INTERVAL: 362 MS
QT INTERVAL: 408 MS
QTC CALCULATION(BAZETT): 469 MS
QTC CALCULATION(BAZETT): 476 MS
QTC FREDERICIA: 430 MS
QTC FREDERICIA: 452 MS
R AXIS: -13 DEGREES
R AXIS: 192 DEGREES
T AXIS: -14 DEGREES
T AXIS: 199 DEGREES
T OFFSET: 395 MS
T OFFSET: 420 MS
VENTRICULAR RATE: 101 BPM
VENTRICULAR RATE: 82 BPM

## 2024-08-14 ENCOUNTER — NURSING HOME VISIT (OUTPATIENT)
Dept: POST ACUTE CARE | Facility: EXTERNAL LOCATION | Age: 64
End: 2024-08-14
Payer: MEDICARE

## 2024-08-14 VITALS
OXYGEN SATURATION: 98 % | SYSTOLIC BLOOD PRESSURE: 123 MMHG | TEMPERATURE: 97 F | RESPIRATION RATE: 18 BRPM | HEART RATE: 90 BPM | DIASTOLIC BLOOD PRESSURE: 86 MMHG

## 2024-08-14 DIAGNOSIS — E27.40 ADRENAL INSUFFICIENCY (MULTI): ICD-10-CM

## 2024-08-14 DIAGNOSIS — R56.9 SEIZURE (MULTI): ICD-10-CM

## 2024-08-14 DIAGNOSIS — K51.011 ULCERATIVE (CHRONIC) PANCOLITIS WITH RECTAL BLEEDING (MULTI): ICD-10-CM

## 2024-08-14 DIAGNOSIS — R26.2 AMBULATORY DYSFUNCTION: ICD-10-CM

## 2024-08-14 DIAGNOSIS — I48.0 PAF (PAROXYSMAL ATRIAL FIBRILLATION) (MULTI): Primary | ICD-10-CM

## 2024-08-14 DIAGNOSIS — G82.20 PARAPARESIS (MULTI): ICD-10-CM

## 2024-08-14 DIAGNOSIS — E11.42 DM TYPE 2 WITH DIABETIC PERIPHERAL NEUROPATHY (MULTI): Chronic | ICD-10-CM

## 2024-08-14 DIAGNOSIS — M32.9 SYSTEMIC LUPUS ERYTHEMATOSUS, UNSPECIFIED SLE TYPE, UNSPECIFIED ORGAN INVOLVEMENT STATUS (MULTI): Chronic | ICD-10-CM

## 2024-08-14 PROCEDURE — 99310 SBSQ NF CARE HIGH MDM 45: CPT | Performed by: NURSE PRACTITIONER

## 2024-08-14 NOTE — LETTER
Patient: Bing Holliday  : 1960    Encounter Date: 2024    Subjective  Bing Holliday is a 63 y.o. female returns from hospitalization due to encephalopathy, sepsis, rectal bleeding, adrenal insufficiency.  HPI   She developed mental status changes, hypotension, found to have sepsis, encephalopathy, rectal bleeding.   She required vasopressors.  CT scan abdomen showed diverticulitis, developed rectal bleeding, had 3 units PRBC. She also developed AF with rvr.  Urine and blood cultures were negative.   She was ultimately transferred to [facility] due to possible lupus flare and needed rheumatology evaluation and lupus flare was ruled out after transfer.     Due to persistent pancytopenia hematology was consulted recommending evaluation for possible bone marrow biopsy.   She is very weak and deconditioned.        Labs:    WBC: 4.9  Hgb: 8.2  Hct: 27.7  Platelet: 111    Na: 142  K: 3.8  Cl: 108  Co2: 22  BUN: 30  Creatinine: 1.63  GFR: 36    MEDS:  Atorvastatin  Budesonide  Lasix (new)  Heparin subcutaneous (new)  Hydrocortisone  Lispro ss coverage  Duoneb  Keppra  Melatonin  Metoprolol tartrate  Mycophenolate  Pantoprazole  Miralax    MEDS THAT WERE STOPPED: risperdal. eliquis      Review of Systems   Constitutional:  Positive for activity change, appetite change and fatigue. Negative for chills, diaphoresis and fever.   Respiratory:  Negative for cough and shortness of breath.    Cardiovascular:  Negative for chest pain and leg swelling.   Gastrointestinal:  Negative for constipation, diarrhea, nausea and vomiting.   Musculoskeletal:  Negative for joint swelling and myalgias.   Psychiatric/Behavioral:  Negative for confusion.        Objective  /86   Pulse 90   Temp 36.1 °C (97 °F)   Resp 18   LMP  (LMP Unknown)   SpO2 98%     Physical Exam  Vitals reviewed.   Constitutional:       General: She is not in acute distress.     Appearance: She is ill-appearing. She is not toxic-appearing.       Comments: Frail, cachectic, very weak.    Cardiovascular:      Rate and Rhythm: Normal rate and regular rhythm.      Pulses: Normal pulses.      Heart sounds:      No gallop.   Pulmonary:      Breath sounds: Normal breath sounds. No wheezing, rhonchi or rales.   Abdominal:      General: Abdomen is flat. Bowel sounds are normal.      Palpations: Abdomen is soft.      Tenderness: There is no guarding or rebound.   Musculoskeletal:      Right lower leg: Edema present.      Left lower leg: Edema present.      Comments: diffusely decreased muscle mass  Minimally able to lift legs off of bed.    Skin:     Comments: Patient has a lower extremity venous stasis chronic changes.    Neurological:      Motor: Weakness present.      Coordination: Coordination abnormal.         Assessment/Plan  Problem List Items Addressed This Visit       Ambulatory dysfunction     continue with therapy as able.  She is weak, frail and deconditioned.   Nonambulatory at present.             Paraparesis (Multi)     Requires assistance with ADL's.  She is very weak, barely able to lift legs off of bed.  continue with therapy as able.             PAF (paroxysmal atrial fibrillation) (Multi) - Primary     On  metoprolol.  Rate controlled.  continue with current medical management.  Eliquis was discontinued in the hospital due to thrombocytopenia and rectal bleeding  with recommendation to follow up with cardiology for consideration of atrial appendage ablation/closure.   Follow up with cardiology.           DM type 2 with diabetic peripheral neuropathy (Multi) (Chronic)     On  coverage.   [facility].   Continue to monitor and adjust meds based on clinical course.         Systemic lupus erythematosus (Multi) (Chronic)     She was transferred to [facility] for evaluation for possible lupus flare which was ruled out.  Follow up with multiple specialists.          Adrenal insufficiency (Multi)     On fludorocortisone  and hydrocortisone.   Follow up with endocrinology for consideration of biphosphonates.   continue with current medical management           Seizure (Multi)     On keppra. no known recent seizures.  Staff to monitor and notify for any seizure activity           Ulcerative (chronic) pancolitis with rectal bleeding (Multi)     Eliquis has been discontinued, no known recurrence of bleeding.         labs/meds/orders reviewed  staff to monitor and notify for any changes.  labs/meds/orders reviewed  staff to monitor and notify for any changes.  Hospital records reviewed.  continue with therapy as able.  She appears very weak and debiliated, ss for discharge planning.    She appears high risk for complication, staff to closely monitor for any changes.   Follow up with cardiology regarding discontinuation of eliquis and consideration of atrial appendage closure/Watchman  Follow up with nephrology and rheumatology  Follow up with hemaotlogy for possible bone marrow biopsy,   Time for coordination of care was greater than 35 minutes with hospital chart review, visit and exam, discussion of treatment plan with patient and also discussion of case with staff.      Electronically Signed By: ASIM Bennett   8/16/24  9:58 PM

## 2024-08-14 NOTE — PROGRESS NOTES
Subjective   Bing Holliday is a 63 y.o. female returns from hospitalization due to encephalopathy, sepsis, rectal bleeding, adrenal insufficiency.  HPI   She developed mental status changes, hypotension, found to have sepsis, encephalopathy, rectal bleeding.   She required vasopressors.  CT scan abdomen showed diverticulitis, developed rectal bleeding, had 3 units PRBC. She also developed AF with rvr.  Urine and blood cultures were negative.   She was ultimately transferred to [facility] due to possible lupus flare and needed rheumatology evaluation and lupus flare was ruled out after transfer.     Due to persistent pancytopenia hematology was consulted recommending evaluation for possible bone marrow biopsy.   She is very weak and deconditioned.        Labs:  8/1  WBC: 4.9  Hgb: 8.2  Hct: 27.7  Platelet: 111    Na: 142  K: 3.8  Cl: 108  Co2: 22  BUN: 30  Creatinine: 1.63  GFR: 36    MEDS:  Atorvastatin  Budesonide  Lasix (new)  Heparin subcutaneous (new)  Hydrocortisone  Lispro ss coverage  Duoneb  Keppra  Melatonin  Metoprolol tartrate  Mycophenolate  Pantoprazole  Miralax    MEDS THAT WERE STOPPED: risperdal. eliquis      Review of Systems   Constitutional:  Positive for activity change, appetite change and fatigue. Negative for chills, diaphoresis and fever.   Respiratory:  Negative for cough and shortness of breath.    Cardiovascular:  Negative for chest pain and leg swelling.   Gastrointestinal:  Negative for constipation, diarrhea, nausea and vomiting.   Musculoskeletal:  Negative for joint swelling and myalgias.   Psychiatric/Behavioral:  Negative for confusion.        Objective   /86   Pulse 90   Temp 36.1 °C (97 °F)   Resp 18   LMP  (LMP Unknown)   SpO2 98%     Physical Exam  Vitals reviewed.   Constitutional:       General: She is not in acute distress.     Appearance: She is ill-appearing. She is not toxic-appearing.      Comments: Frail, cachectic, very weak.    Cardiovascular:      Rate and  Rhythm: Normal rate and regular rhythm.      Pulses: Normal pulses.      Heart sounds:      No gallop.   Pulmonary:      Breath sounds: Normal breath sounds. No wheezing, rhonchi or rales.   Abdominal:      General: Abdomen is flat. Bowel sounds are normal.      Palpations: Abdomen is soft.      Tenderness: There is no guarding or rebound.   Musculoskeletal:      Right lower leg: Edema present.      Left lower leg: Edema present.      Comments: diffusely decreased muscle mass  Minimally able to lift legs off of bed.    Skin:     Comments: Patient has a lower extremity venous stasis chronic changes.    Neurological:      Motor: Weakness present.      Coordination: Coordination abnormal.         Assessment/Plan   Problem List Items Addressed This Visit       Ambulatory dysfunction     continue with therapy as able.  She is weak, frail and deconditioned.   Nonambulatory at present.             Paraparesis (Multi)     Requires assistance with ADL's.  She is very weak, barely able to lift legs off of bed.  continue with therapy as able.             PAF (paroxysmal atrial fibrillation) (Multi) - Primary     On  metoprolol.  Rate controlled.  continue with current medical management.  Eliquis was discontinued in the hospital due to thrombocytopenia and rectal bleeding  with recommendation to follow up with cardiology for consideration of atrial appendage ablation/closure.   Follow up with cardiology.           DM type 2 with diabetic peripheral neuropathy (Multi) (Chronic)     On ss coverage.   [facility].   Continue to monitor and adjust meds based on clinical course.         Systemic lupus erythematosus (Multi) (Chronic)     She was transferred to [facility] for evaluation for possible lupus flare which was ruled out.  Follow up with multiple specialists.          Adrenal insufficiency (Multi)     On fludorocortisone  and hydrocortisone.  Follow up with endocrinology for consideration of biphosphonates.    continue with current medical management           Seizure (Multi)     On keppra. no known recent seizures.  Staff to monitor and notify for any seizure activity           Ulcerative (chronic) pancolitis with rectal bleeding (Multi)     Eliquis has been discontinued, no known recurrence of bleeding.         labs/meds/orders reviewed  staff to monitor and notify for any changes.  labs/meds/orders reviewed  staff to monitor and notify for any changes.  Hospital records reviewed.  continue with therapy as able.  She appears very weak and debiliated, ss for discharge planning.    She appears high risk for complication, staff to closely monitor for any changes.   Follow up with cardiology regarding discontinuation of eliquis and consideration of atrial appendage closure/Watchman  Follow up with nephrology and rheumatology  Follow up with hemaotlogy for possible bone marrow biopsy,   Time for coordination of care was greater than 35 minutes with hospital chart review, visit and exam, discussion of treatment plan with patient and also discussion of case with staff.

## 2024-08-16 ASSESSMENT — ENCOUNTER SYMPTOMS
DIARRHEA: 0
DIAPHORESIS: 0
SHORTNESS OF BREATH: 0
ACTIVITY CHANGE: 1
CHILLS: 0
MYALGIAS: 0
NAUSEA: 0
VOMITING: 0
FATIGUE: 1
CONSTIPATION: 0
APPETITE CHANGE: 1
COUGH: 0
FEVER: 0
JOINT SWELLING: 0
CONFUSION: 0

## 2024-08-17 NOTE — ASSESSMENT & PLAN NOTE
continue with therapy as able.  She is weak, frail and deconditioned.   Nonambulatory at present.

## 2024-08-17 NOTE — ASSESSMENT & PLAN NOTE
Requires assistance with ADL's.  She is very weak, barely able to lift legs off of bed.  continue with therapy as able.

## 2024-08-17 NOTE — ASSESSMENT & PLAN NOTE
On  metoprolol.  Rate controlled.  continue with current medical management.  Eliquis was discontinued in the hospital due to thrombocytopenia and rectal bleeding  with recommendation to follow up with cardiology for consideration of atrial appendage ablation/closure.   Follow up with cardiology.

## 2024-08-17 NOTE — ASSESSMENT & PLAN NOTE
She was transferred to [facility] for evaluation for possible lupus flare which was ruled out.  Follow up with multiple specialists.

## 2024-08-17 NOTE — ASSESSMENT & PLAN NOTE
On fludorocortisone  and hydrocortisone.  Follow up with endocrinology for consideration of biphosphonates.   continue with current medical management

## 2024-08-17 NOTE — ASSESSMENT & PLAN NOTE
On  coverage.   [facility].   Continue to monitor and adjust meds based on clinical course.

## 2024-08-20 PROBLEM — M35.1 MIXED CONNECTIVE TISSUE DISEASE (MULTI): Status: ACTIVE | Noted: 2024-08-20

## 2024-08-20 PROBLEM — N18.4 CHRONIC KIDNEY DISEASE, STAGE 4 (SEVERE) (MULTI): Status: RESOLVED | Noted: 2024-03-06 | Resolved: 2024-08-20

## 2024-08-20 PROBLEM — N18.31 CKD STAGE G3A/A2, GFR 45-59 AND ALBUMIN CREATININE RATIO 30-299 MG/G (MULTI): Chronic | Status: RESOLVED | Noted: 2023-10-30 | Resolved: 2024-08-20

## 2024-08-20 ASSESSMENT — ENCOUNTER SYMPTOMS
WOUND: 1
VOMITING: 0
DIARRHEA: 0
MYALGIAS: 0
SHORTNESS OF BREATH: 0
NAUSEA: 0
DIAPHORESIS: 0
COUGH: 0
JOINT SWELLING: 0
CHILLS: 0
ACTIVITY CHANGE: 1
APPETITE CHANGE: 1
FEVER: 0
CONFUSION: 1
FATIGUE: 1
CONSTIPATION: 0

## 2024-08-21 ENCOUNTER — HOSPITAL ENCOUNTER (OUTPATIENT)
Dept: CARDIOLOGY | Facility: HOSPITAL | Age: 64
Discharge: HOME | End: 2024-08-21
Payer: MEDICARE

## 2024-08-21 ENCOUNTER — HOSPITAL ENCOUNTER (OUTPATIENT)
Dept: RADIOLOGY | Facility: HOSPITAL | Age: 64
Discharge: HOME | End: 2024-08-21
Payer: MEDICARE

## 2024-08-21 ENCOUNTER — APPOINTMENT (OUTPATIENT)
Dept: CARDIOLOGY | Facility: CLINIC | Age: 64
End: 2024-08-21
Payer: MEDICARE

## 2024-08-21 VITALS — SYSTOLIC BLOOD PRESSURE: 104 MMHG | HEART RATE: 150 BPM | DIASTOLIC BLOOD PRESSURE: 62 MMHG

## 2024-08-21 DIAGNOSIS — Z51.81 ANTICOAGULATION MANAGEMENT ENCOUNTER: ICD-10-CM

## 2024-08-21 DIAGNOSIS — Z95.0 PACEMAKER: ICD-10-CM

## 2024-08-21 DIAGNOSIS — I48.11 LONGSTANDING PERSISTENT ATRIAL FIBRILLATION (MULTI): ICD-10-CM

## 2024-08-21 DIAGNOSIS — R00.1 BRADYCARDIA, UNSPECIFIED: ICD-10-CM

## 2024-08-21 DIAGNOSIS — Z79.01 ANTICOAGULATION MANAGEMENT ENCOUNTER: ICD-10-CM

## 2024-08-21 DIAGNOSIS — Z78.9 NEVER SMOKED TOBACCO: ICD-10-CM

## 2024-08-21 DIAGNOSIS — Z79.01 LONG TERM CURRENT USE OF ANTICOAGULANT THERAPY: Primary | ICD-10-CM

## 2024-08-21 DIAGNOSIS — R94.31 ABNORMAL EKG: ICD-10-CM

## 2024-08-21 DIAGNOSIS — I48.0 PAF (PAROXYSMAL ATRIAL FIBRILLATION) (MULTI): ICD-10-CM

## 2024-08-21 DIAGNOSIS — I10 BENIGN ESSENTIAL HTN: ICD-10-CM

## 2024-08-21 PROCEDURE — 99215 OFFICE O/P EST HI 40 MIN: CPT | Performed by: INTERNAL MEDICINE

## 2024-08-21 PROCEDURE — 3074F SYST BP LT 130 MM HG: CPT | Performed by: INTERNAL MEDICINE

## 2024-08-21 PROCEDURE — 93280 PM DEVICE PROGR EVAL DUAL: CPT | Performed by: INTERNAL MEDICINE

## 2024-08-21 PROCEDURE — 3078F DIAST BP <80 MM HG: CPT | Performed by: INTERNAL MEDICINE

## 2024-08-21 PROCEDURE — 71046 X-RAY EXAM CHEST 2 VIEWS: CPT

## 2024-08-21 PROCEDURE — 3060F POS MICROALBUMINURIA REV: CPT | Performed by: INTERNAL MEDICINE

## 2024-08-21 PROCEDURE — 93280 PM DEVICE PROGR EVAL DUAL: CPT

## 2024-08-21 PROCEDURE — 93000 ELECTROCARDIOGRAM COMPLETE: CPT | Mod: DISTINCT PROCEDURAL SERVICE | Performed by: INTERNAL MEDICINE

## 2024-08-21 PROCEDURE — 3044F HG A1C LEVEL LT 7.0%: CPT | Performed by: INTERNAL MEDICINE

## 2024-08-21 RX ORDER — METOPROLOL TARTRATE 25 MG/1
25 TABLET, FILM COATED ORAL 2 TIMES DAILY
Qty: 180 TABLET | Refills: 3 | Status: SHIPPED | OUTPATIENT
Start: 2024-08-21

## 2024-08-21 ASSESSMENT — ENCOUNTER SYMPTOMS
DYSPNEA ON EXERTION: 0
PALPITATIONS: 1

## 2024-08-21 NOTE — PATIENT INSTRUCTIONS
Continue same medications/treatment.  Patient educated on proper medication use.  Patient educated on risk factor modification.  Please bring any lab results from other providers/physicians to your next appointment.    Please bring all medicines, vitamins, and herbal supplements with you when you come to the office.    Prescriptions will not be filled unless you are compliant with your follow up appointments or have a follow up appointment scheduled as per instruction of your physician. Refills should be requested at the time of your visit.    INCREASE metoprolol tartrate to 25mg twice daily  REFER to hematology to discuss resuming Eliquis  Follow up with [facility] in October with [employer] Renetta Matamoros RN, AM SCRIBING FOR, AND IN THE PRESENCE OF DR. TUAN ALMONTE MD

## 2024-08-21 NOTE — PROGRESS NOTES
CARDIOLOGY OFFICE VISIT      CHIEF COMPLAINT  Chief Complaint   Patient presents with    Follow-up     91 day with device check and chest x-ray       HISTORY OF PRESENT ILLNESS  HPI  63 y.o. female presenting with mental status changes. Past Medical History of lupus complicated by cerebritis currently on CellCept and prednisone, recurrent adrenal insufficiency, CKD with a baseline creatinine of 1.5-1.9, COPD, GERD, atrial fibrillation on Eliquis, coronary artery disease s/p CABG in 2013, history of AAA      Patient had multiple admissions for hyper thermia, hyponatremia and also mental status changes.  She was treated with steroid therapy.  She has a history of adrenal insufficiency described above.     Patient was admitted in May 2024 at [facility] for hypoglycemia.  She also was found to be hyponatremic and also with anemia with a hemoglobin of 7.8.  Chronic hemoglobin 6.9.  She has been treated also for possible UTI with antibiotic therapy.        EKG on arrival shows sinus rhythm at a rate of 65 bpm QRS ration 100 ms QT corrected 165 ms.  The last 24 hours, patient has been bradycardic with heart rates as low as 32 bpm.  EKG performed today shows sinus bradycardia at a rate of 39 bpm QRS ration 102 ms QT corrected 440 ms.  Her laboratory on admission shows sodium 136 potassium 4.0 BUN 29 creatinine 1.53 GFR of 38 ALT 23 AST 24 troponin 15 TSH 2.79.  WBC 3.0.  Hemoglobin 7.8.  Hematocrit 26.3.  Platelet count 120.  Hemoglobin today 6.9.     Looking at her records.  EKGs during admission also has been showing marked sinus bradycardia with rates as low as 40 bpm.        Echocardiogram March 2024   CONCLUSIONS:   1. Left ventricular systolic function is mildly decreased with a 40-45% estimated ejection fraction.   2. There is low normal right ventricular systolic function.   3. Mild to moderate mitral valve regurgitation.   4. Mild to moderately elevated right ventricular systolic pressure.   5. Aortic  valve stenosis is not present.   6. There is global hypokinesis of the left ventricle with minor regional variations    During this hospitalization, patient has significant bradycardia with brief episodes of atrial fibrillation.  Due to evidence of tachybradycardia syndrome dual-chamber pacemaker was implanted in May 17, 2024 with no complications.    Since then looking at her record she was readmitted to [facility] in June 2024 for cellulitis of the lower extremities.  She was readmitted again in July 2024 to [facility] for worsening adrenal insufficiency and also septic shock.  She was placed on antibiotic therapy.    Currently she is in a nursing home.  She denies any symptoms like chest pain or shortness of breath.  Looking also at her records Eliquis has been discontinued during 1 of these admissions due to evidence of thrombocytopenia.  No recent hematology evaluation.    EKG performed today shows atrial fibrillation with rapid ventricular response at a rate of 150 bpm QRS ration 90 ms QT corrected 432 ms.  Rhythm strip shows the same pattern.    Patient had a device interrogation today at the device clinic and it shows dual-chamber pacemaker [employer] implanted on May 17, 2024 with battery longevity 12 years.  [place] of atrial fraction 44% of the time with current atrial fibrillation in progress in July 25, 2024.  Occasionally episodes of rapid ventricular response.  No other significant findings.        Past Medical History  Past Medical History:   Diagnosis Date    Abnormal kidney function 04/04/2023    Acute headache 03/10/2024    Acute iritis of right eye 07/24/2015    Acute low back pain 10/30/2023    Acute upper respiratory infection, unspecified 03/04/2020    Acute URI    Acute upper respiratory infection, unspecified 09/30/2015    URTI (acute upper respiratory infection)    Arthralgia of right knee 02/14/2024    Arthritis     Atypical facial pain 03/10/2024    Body mass index  (BMI) 23.0-23.9, adult 10/15/2021    BMI 23.0-23.9, adult    Body mass index (BMI) 33.0-33.9, adult 03/04/2020    BMI 33.0-33.9,adult    Cardiomegaly 08/27/2013    Left ventricular hypertrophy    Chest pain 06/10/2022    Comment on above: Added by Problem List Migration; 2013-3-12; Moved to Suppressed Nov 25 2013 9:16PM; Comment on above: Added by Problem List Migration; 2013-3-12; Moved to Suppressed Nov 25 2013 9:16PM;    Chronic kidney disease, stage 3 unspecified (Multi) 07/02/2013    Chronic kidney disease, stage III (moderate)    Confusional state 03/10/2024    Contact with and (suspected) exposure to covid-19 04/04/2023    Delirium 03/10/2024    Disease of pericardium, unspecified ([facility]) 07/02/2013    Pericardial disease    Encounter for follow-up examination after completed treatment for conditions other than malignant neoplasm 10/06/2022    Hospital discharge follow-up    Generalized contraction of visual field, right eye 01/29/2015    Generalized contraction of visual field of right eye    Hearing loss 03/10/2024    History of cataract 03/10/2024    History of thrombocytopenia 03/10/2024    Comment on above: Added by Problem List Migration; 2013-7-2;    Homonymous bilateral field defects, right side 04/29/2016    Homonymous bilateral field defects of right side    Hypertensive chronic kidney disease with stage 1 through stage 4 chronic kidney disease, or unspecified chronic kidney disease 07/02/2013    Nephrosclerosis    Laceration without foreign body, left foot, initial encounter 07/03/2018    Foot laceration, left, initial encounter    Localized edema 03/10/2024    Localized, primary osteoarthritis 02/14/2024    Mass of skin 03/10/2024    Migraine with aura, not intractable, without status migrainosus 10/24/2022    Ocular migraine    Open wound 03/10/2024    Open wound of left foot 03/10/2024    Other conditions influencing health status 07/02/2013    Chronic Glomerulonephritis In Diseases Classified  Elsewhere    Other conditions influencing health status 07/02/2013    Progressive Familial Myoclonic Epilepsy    Other conditions influencing health status 07/02/2013    Protein S Deficiency    Other conditions influencing health status 05/22/2015    Familial Combined Hyperlipidemia    Other conditions influencing health status 10/24/2022    IDDM (insulin dependent diabetes mellitus)    Other conditions influencing health status 03/14/2022    Diabetes mellitus, insulin dependent (IDDM), uncontrolled    Other long term (current) drug therapy 10/24/2022    Long-term use of Plaquenil    Overweight with body mass index (BMI) 25.0-29.9 03/10/2024    Pain of toe 03/10/2024    Personal history of COVID-19 04/04/2023    Personal history of diseases of the blood and blood-forming organs and certain disorders involving the immune mechanism 07/02/2013    History of thrombocytopenia    Personal history of diseases of the skin and subcutaneous tissue 08/11/2015    History of foot ulcer    Personal history of nephrotic syndrome 07/02/2013    History of nephrotic syndrome    Personal history of other diseases of the circulatory system 08/27/2013    History of sinus tachycardia    Personal history of other diseases of the nervous system and sense organs     History of cataract    Personal history of other diseases of the respiratory system     History of bronchitis    Personal history of other infectious and parasitic diseases 07/02/2013    History of hepatitis    Personal history of other specified conditions     History of shortness of breath    Personal history of other specified conditions 08/27/2013    History of edema    Posterior epistaxis 03/10/2024    Puckering of macula, right eye 10/24/2022    ERM OD (epiretinal membrane, right eye)    Raynaud's syndrome without gangrene 07/02/2013    Raynaud's disease    Sinusitis 03/10/2024    Systemic lupus erythematosus, unspecified (Multi) 07/24/2015    SLE (systemic lupus  erythematosus)    Systemic lupus erythematosus, unspecified (Multi) 07/24/2015    SLE (systemic lupus erythematosus)    Systemic lupus erythematosus, unspecified (Multi) 07/24/2015    Systemic lupus    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) 07/02/2013    Type 2 diabetes with nephropathy    Type 2 diabetes mellitus with mild nonproliferative diabetic retinopathy without macular edema, left eye (Multi) 07/27/2015    Non-proliferative diabetic retinopathy, left eye    Type 2 diabetes mellitus with mild nonproliferative diabetic retinopathy without macular edema, unspecified eye (Multi) 07/24/2015    Mild non proliferative diabetic retinopathy    Type 2 diabetes mellitus with proliferative diabetic retinopathy without macular edema, right eye (Multi) 07/27/2015    Proliferative diabetic retinopathy of right eye    Type 2 diabetes mellitus with proliferative diabetic retinopathy without macular edema, unspecified eye (Multi) 07/24/2015    Diabetic proliferative retinopathy    Unspecified acute and subacute iridocyclitis 07/24/2015    Acute iritis, right eye    Unspecified open wound, left foot, sequela 07/03/2018    Wound, open, foot, left, sequela       Social History  Social History     Tobacco Use    Smoking status: Never     Passive exposure: Never    Smokeless tobacco: Never   Vaping Use    Vaping status: Never Used   Substance Use Topics    Alcohol use: Not Currently     Comment: RARE    Drug use: Never       Family History     Family History   Problem Relation Name Age of Onset    Other (RENAL DISEASE) Mother          END STAGE    Other (CARDIAC DISORDER) Mother      Cataracts Mother      Stroke Mother      Diabetes Mother      Kidney disease Mother      Lupus Mother      Other (CARDIAC DISORDER) Father      COPD Father      Glaucoma Father      Hypertension Father      Sleep apnea Father      Other (CARDIAC DISORDER) Sister      Depression Sister      Kidney disease Sister      Sickle cell  "trait Sister      Sleep apnea Daughter          Allergies:  Allergies   Allergen Reactions    Ace Inhibitors Shortness of breath, Swelling, Other and Angioedema     Facial droop    Hydroxychloroquine Other and Nausea/vomiting     Retinal Bleeding    Lisinopril Swelling    Sulfa (Sulfonamide Antibiotics) Hives        Outpatient Medications:  Current Outpatient Medications   Medication Instructions    atorvastatin (LIPITOR) 40 mg, oral, Nightly    blood-glucose sensor (Dexcom G6 Sensor) device Use to check sugars 3 times daily    budesonide (PULMICORT) 0.5 mg, nebulization, 2 times daily RT, Rinse mouth with water after use to reduce aftertaste and incidence of candidiasis. Do not swallow.    Dexcom G4 platinum  (Dexcom G6 ) misc Use as instructed    Dexcom G4 platinum transmitter (Dexcom G6 Transmitter) device Use as instructed    furosemide (LASIX) 40 mg, oral, Daily    heparin (porcine) 5,000 Units, subcutaneous, Every 8 hours    hydrocortisone (CORTEF) 10 mg, oral, Every evening    hydrocortisone (CORTEF) 20 mg, oral, Every morning    insulin lispro (HUMALOG) 0-10 Units, subcutaneous, 3 times daily PRN, Take as directed per insulin Sliding Scale instructions.<BR>0-150 = 0 units<BR>151-200 = 2 units<BR>201-250 = 4 units<BR>251-300 = 6 units<BR>301-350 = 8 units<BR>351-400 = 10 units<BR>>400 = give 10 units and contact MD    insulin syringe,safetyneedle 29G X 1/2\" 0.5 mL syringe Use to inject 1-4 times daily.    ipratropium-albuteroL (Duo-Neb) 0.5-2.5 mg/3 mL nebulizer solution 3 mL, nebulization, Every 4 hours PRN    levETIRAcetam (KEPPRA) 500 mg, oral, 2 times daily    melatonin 5 mg, oral, Nightly    metoprolol tartrate (LOPRESSOR) 6.25 mg, oral, 2 times daily    mycophenolate (CELLCEPT) 1,000 mg, oral, 2 times daily    pantoprazole (PROTONIX) 40 mg, oral, Daily before breakfast, Do not crush, chew, or split.    pen needle, diabetic 31 gauge x 5/16\" needle Use to inject 1-4 times daily as " directed.    polyethylene glycol (GLYCOLAX, MIRALAX) 17 g, oral, Daily          REVIEW OF SYSTEMS  Review of Systems   Cardiovascular:  Positive for palpitations. Negative for chest pain and dyspnea on exertion.   All other systems reviewed and are negative.        VITALS  Vitals:    08/21/24 1456   BP: 104/62   Pulse: (!) 150       PHYSICAL EXAM  Constitutional:       General: Awake.      Appearance: Normal and healthy appearance. Not in distress.   Neck:      Vascular: No JVR. JVD normal.   Pulmonary:      Effort: Pulmonary effort is normal.      Breath sounds: Normal breath sounds. No wheezing. No rhonchi. No rales.   Chest:      Chest wall: Not tender to palpatation.      Comments: Left sided device pocket- healed and well approximated. No swelling or hematoma      Cardiovascular:      PMI at left midclavicular line. Tachycardia present. Irregularly irregular rhythm. Normal S1. Normal S2.       Murmurs: There is no murmur.      No gallop.  No click. No rub.      Comments: Atrial fibrillation  Pulses:     Intact distal pulses.   Edema:     Peripheral edema absent.   Abdominal:      Tenderness: There is no abdominal tenderness.   Musculoskeletal: Normal range of motion.         General: No tenderness. Skin:     General: Skin is warm and dry.   Neurological:      General: No focal deficit present.      Mental Status: Alert and oriented to person, place and time.           ASSESSMENT AND PLAN  Clinical impression     1.  Marked sinus bradycardia  2.  Hypertension  3.  Lupus  4.  Chronic kidney disease  5.  Anemia  6.  History of coronary artery disease with coronary artery bypass graft surgery in 2013  7.  History of atrial fibrillation on Eliquis therapy.  Recently taking of Eliquis therapy due to evidence of thrombocytopenia  8.  History of adrenal insufficiency  9.  Cardiomyopathy with a left ventricular ejection fraction n 40% per echocardiogram in March. 2024  10.  Status post dual-chamber pacemaker implanted  in May 2024    Plan recommendations    I had a lengthy discussion with patient about management for atrial fibrillation and pacemaker care.  Pacemaker is functioning well.  Will continue with follow-ups in office every 3 months or sooner needed.    We will continue rate control strategy for atrial fibrillation now noted that the patient is not on Eliquis therapy.  Patient is to have an evaluation by hematology service for recommendations regarding anticoagulation therapy.  If patient cannot be on oral anticoagulation we can consider Watchman device implantation but also we need to okay for hematology to use dual antiplatelet therapy for at least 6 months.    We will increase the dose of Lopressor to 25 mg 1 tablet twice a day.    Follow my office in the next 4 to 6 weeks for reevaluation of rate control during atrial fibrillation.    If patient has restoration to sinus rhythm we will offer her antiarrhythmic therapy at that time.    Risk factor modification and lifestyle modification discussed with patient. Diet , exercise and hydration discussed with patient.      I have personally review with patient during this office visit, laboratory data, echocardiogram results, stress test results, Holter-event monitor results prior and after the last electrophysiology visit. All questions has been answered.    Please excuse any errors in grammar or translation related to this dictation.  Voice recognition software was utilized to prepare this document.

## 2024-08-22 ENCOUNTER — TELEPHONE (OUTPATIENT)
Dept: PHARMACY | Facility: HOSPITAL | Age: 64
End: 2024-08-22
Payer: MEDICARE

## 2024-08-22 ENCOUNTER — TELEPHONE (OUTPATIENT)
Dept: CARDIOLOGY | Facility: CLINIC | Age: 64
End: 2024-08-22
Payer: MEDICARE

## 2024-08-22 NOTE — TELEPHONE ENCOUNTER
Faxed referral for Hematology, face sheet and last office note to [facility] at [phone] and received a successful fax confirmation.

## 2024-08-22 NOTE — TELEPHONE ENCOUNTER
Population Health: Outreach by Ambulatory Pharmacy Team    Payor: Rohit MANLEY  Reason: Adherence  Medication: Atorvastatin 40 mg  Outcome: No Answer/Invalid Number    ESTEE THOMPSON

## 2024-08-23 NOTE — TELEPHONE ENCOUNTER
I reviewed the telephone encounter and agree with the student’s findings and plans as written. Case discussed with student.    Alla Manuel, PharmD

## 2024-08-26 ENCOUNTER — DOCUMENTATION (OUTPATIENT)
Dept: PRIMARY CARE | Facility: CLINIC | Age: 64
End: 2024-08-26
Payer: MEDICARE

## 2024-08-26 NOTE — PROGRESS NOTES
5/7 - ReAdmitted from [facility] (SNF)  5/18 - Discharged to [facility]  6/4-6/17: EM for Dehydration, discharge to SNF.  6/28-7/9: [facility] for pancytopenia, DC to [facility]  7/15-7/30: [facility] transferred 7/30 to [facility]  8/2: Discharged to [facility] (SNF)    Patient has been in and out of hospital and discharged back into SNF on several occasions. Patient resides in SNF at this time per reports. Min has not made contact since April 2024.    When patient would be discharged from SNF or require additional support in future to manage chronic conditions and facilitate care patient can be re-referred to CCM services.     At this time CCM program has been closed.

## 2024-08-27 ENCOUNTER — TELEPHONE (OUTPATIENT)
Dept: HEMATOLOGY/ONCOLOGY | Facility: CLINIC | Age: 64
End: 2024-08-27
Payer: MEDICARE

## 2024-08-27 NOTE — TELEPHONE ENCOUNTER
This  called patient's son, López, to set up hospital follow up visit with Dr. Ramesh.  Left message on his voicemail to call office back to schedule.  Will find out where patient was previously treated and obtain records as well as offer 9/13/24 at 12pm with Dr. Ramesh.

## 2024-08-28 ENCOUNTER — NURSING HOME VISIT (OUTPATIENT)
Dept: POST ACUTE CARE | Facility: EXTERNAL LOCATION | Age: 64
End: 2024-08-28
Payer: MEDICARE

## 2024-08-28 VITALS
DIASTOLIC BLOOD PRESSURE: 70 MMHG | RESPIRATION RATE: 18 BRPM | HEART RATE: 80 BPM | WEIGHT: 211 LBS | OXYGEN SATURATION: 96 % | BODY MASS INDEX: 36.92 KG/M2 | SYSTOLIC BLOOD PRESSURE: 124 MMHG | TEMPERATURE: 97.3 F

## 2024-08-28 DIAGNOSIS — I10 BENIGN ESSENTIAL HTN: Primary | Chronic | ICD-10-CM

## 2024-08-28 DIAGNOSIS — M32.9 LUPUS (MULTI): Chronic | ICD-10-CM

## 2024-08-28 DIAGNOSIS — E11.42 DM TYPE 2 WITH DIABETIC PERIPHERAL NEUROPATHY (MULTI): Chronic | ICD-10-CM

## 2024-08-28 DIAGNOSIS — N18.32 STAGE 3B CHRONIC KIDNEY DISEASE (MULTI): ICD-10-CM

## 2024-08-28 DIAGNOSIS — E27.40 ADRENAL INSUFFICIENCY (MULTI): ICD-10-CM

## 2024-08-28 DIAGNOSIS — R26.2 AMBULATORY DYSFUNCTION: ICD-10-CM

## 2024-08-28 DIAGNOSIS — I48.0 PAF (PAROXYSMAL ATRIAL FIBRILLATION) (MULTI): ICD-10-CM

## 2024-08-28 DIAGNOSIS — R56.9 SEIZURE (MULTI): ICD-10-CM

## 2024-08-28 PROCEDURE — 99309 SBSQ NF CARE MODERATE MDM 30: CPT | Performed by: NURSE PRACTITIONER

## 2024-08-28 NOTE — PROGRESS NOTES
Subjective   Bing Holliday is a 63 y.o. female here for routine follow up  HPI    There are no new problems and multiple health problems have been reviewed.  The course has been unchanged.  The patient  is mildly confused, forgetful.   There are no family members present during time of visit.  She is sitting up in chair, appears weak but at her baseline , denies any complaints when asked.  Eating and drinking fair per staff.    No concerns per staff.        Review of Systems   Constitutional:  Positive for activity change, appetite change and fatigue. Negative for chills, diaphoresis and fever.   Respiratory:  Negative for cough and shortness of breath.    Cardiovascular:  Negative for chest pain and leg swelling.   Gastrointestinal:  Negative for constipation, diarrhea, nausea and vomiting.   Musculoskeletal:  Negative for joint swelling and myalgias.   Psychiatric/Behavioral:  Negative for confusion.        Objective   /70   Pulse 80   Temp 36.3 °C (97.3 °F)   Resp 18   Wt 95.7 kg (211 lb)   LMP  (LMP Unknown)   SpO2 96%   BMI 36.92 kg/m²     Physical Exam  Vitals reviewed.   Constitutional:       General: She is not in acute distress.     Appearance: She is ill-appearing. She is not toxic-appearing.      Comments: Frail, cachectic, very weak.    Cardiovascular:      Rate and Rhythm: Normal rate and regular rhythm.      Pulses: Normal pulses.      Heart sounds:      No gallop.   Pulmonary:      Breath sounds: Normal breath sounds. No wheezing, rhonchi or rales.   Abdominal:      General: Abdomen is flat. Bowel sounds are normal.      Palpations: Abdomen is soft.      Tenderness: There is no guarding or rebound.   Musculoskeletal:      Left lower leg: No edema.      Comments: diffusely decreased muscle mass     Skin:     Comments: Patient has a lower extremity venous stasis chronic changes.    Neurological:      Motor: Weakness present.      Coordination: Coordination abnormal.          Assessment/Plan   Problem List Items Addressed This Visit       Lupus (Multi) (Chronic)     Follow up with specialists as scheduled.          Ambulatory dysfunction     continue with therapy as able.  She is weak, frail and deconditioned.   Nonambulatory at present.             PAF (paroxysmal atrial fibrillation) (Multi)     On  metoprolol.  Rate controlled.  continue with current medical management.  Eliquis was discontinued in the hospital due to thrombocytopenia and rectal bleeding  with recommendation to follow up with cardiology for consideration of atrial appendage ablation/closure.   Follow up with cardiology.           Chronic kidney disease (CKD), stage III (moderate) (Multi)     monitor with routine labs.           Benign essential HTN - Primary (Chronic)     On metoprolol, likely for rate control with af.   Blood pressure acceptable.  Continue to monitor and adjust meds based on clinical course.           DM type 2 with diabetic peripheral neuropathy (Multi) (Chronic)     On  coverage.   [facility].   Continue to monitor and adjust meds based on clinical course.  Blood sugars reviewed, 100-300's.          Adrenal insufficiency (Multi)     On hydrocortisone.  Follow up with endocrinology for consideration of biphosphonates.   continue with current medical management           Seizure (Multi)     On keppra. no known recent seizures.  Staff to monitor and notify for any seizure activity          labs/meds/orders reviewed  staff to monitor and notify for any changes.  labs/meds/orders reviewed  staff to monitor and notify for any changes.  continue with therapy as able.  She appears very weak and debiliated,  for discharge planning.    She appears high risk for complication, staff to closely monitor for any changes.   Follow up with cardiology regarding discontinuation of eliquis and consideration of atrial appendage closure/Watchman  Follow up with nephrology and  rheumatology  Follow up with hemaotlogy for possible bone marrow biopsy,

## 2024-08-28 NOTE — LETTER
Patient: Bing Holliday  : 1960    Encounter Date: 2024    Subjective  Bing Holliday is a 63 y.o. female here for routine follow up  HPI    There are no new problems and multiple health problems have been reviewed.  The course has been unchanged.  The patient  is mildly confused, forgetful.   There are no family members present during time of visit.  She is sitting up in chair, appears weak but at her baseline , denies any complaints when asked.  Eating and drinking fair per staff.    No concerns per staff.        Review of Systems   Constitutional:  Positive for activity change, appetite change and fatigue. Negative for chills, diaphoresis and fever.   Respiratory:  Negative for cough and shortness of breath.    Cardiovascular:  Negative for chest pain and leg swelling.   Gastrointestinal:  Negative for constipation, diarrhea, nausea and vomiting.   Musculoskeletal:  Negative for joint swelling and myalgias.   Psychiatric/Behavioral:  Negative for confusion.        Objective  /70   Pulse 80   Temp 36.3 °C (97.3 °F)   Resp 18   Wt 95.7 kg (211 lb)   LMP  (LMP Unknown)   SpO2 96%   BMI 36.92 kg/m²     Physical Exam  Vitals reviewed.   Constitutional:       General: She is not in acute distress.     Appearance: She is ill-appearing. She is not toxic-appearing.      Comments: Frail, cachectic, very weak.    Cardiovascular:      Rate and Rhythm: Normal rate and regular rhythm.      Pulses: Normal pulses.      Heart sounds:      No gallop.   Pulmonary:      Breath sounds: Normal breath sounds. No wheezing, rhonchi or rales.   Abdominal:      General: Abdomen is flat. Bowel sounds are normal.      Palpations: Abdomen is soft.      Tenderness: There is no guarding or rebound.   Musculoskeletal:      Left lower leg: No edema.      Comments: diffusely decreased muscle mass     Skin:     Comments: Patient has a lower extremity venous stasis chronic changes.    Neurological:      Motor: Weakness  present.      Coordination: Coordination abnormal.         Assessment/Plan  Problem List Items Addressed This Visit       Lupus (Multi) (Chronic)     Follow up with specialists as scheduled.          Ambulatory dysfunction     continue with therapy as able.  She is weak, frail and deconditioned.   Nonambulatory at present.             PAF (paroxysmal atrial fibrillation) (Multi)     On  metoprolol.  Rate controlled.  continue with current medical management.  Eliquis was discontinued in the hospital due to thrombocytopenia and rectal bleeding  with recommendation to follow up with cardiology for consideration of atrial appendage ablation/closure.   Follow up with cardiology.           Chronic kidney disease (CKD), stage III (moderate) (Multi)     monitor with routine labs.           Benign essential HTN - Primary (Chronic)     On metoprolol, likely for rate control with af.   Blood pressure acceptable.  Continue to monitor and adjust meds based on clinical course.           DM type 2 with diabetic peripheral neuropathy (Multi) (Chronic)     On  coverage.   [facility] [[email or web address]   Continue to monitor and adjust meds based on clinical course.  Blood sugars reviewed, 100-300's.          Adrenal insufficiency (Multi)     On hydrocortisone.  Follow up with endocrinology for consideration of biphosphonates.   continue with current medical management           Seizure (Multi)     On keppra. no known recent seizures.  Staff to monitor and notify for any seizure activity          labs/meds/orders reviewed  staff to monitor and notify for any changes.  labs/meds/orders reviewed  staff to monitor and notify for any changes.  continue with therapy as able.  She appears very weak and debiliated,  for discharge planning.    She appears high risk for complication, staff to closely monitor for any changes.   Follow up with cardiology regarding discontinuation of eliquis and consideration of atrial appendage  closure/Watchman  Follow up with nephrology and rheumatology  Follow up with hemaotlogy for possible bone marrow biopsy,         Electronically Signed By: ASIM Bennett   8/30/24  3:57 PM

## 2024-08-30 ASSESSMENT — ENCOUNTER SYMPTOMS
NAUSEA: 0
FEVER: 0
APPETITE CHANGE: 1
COUGH: 0
DIAPHORESIS: 0
FATIGUE: 1
DIARRHEA: 0
JOINT SWELLING: 0
CONSTIPATION: 0
ACTIVITY CHANGE: 1
CONFUSION: 0
CHILLS: 0
MYALGIAS: 0
VOMITING: 0
SHORTNESS OF BREATH: 0

## 2024-08-30 NOTE — ASSESSMENT & PLAN NOTE
On hydrocortisone.  Follow up with endocrinology for consideration of biphosphonates.   continue with current medical management

## 2024-08-30 NOTE — ASSESSMENT & PLAN NOTE
On  coverage.   [facility].   Continue to monitor and adjust meds based on clinical course.  Blood sugars reviewed, 100-300's.

## 2024-08-30 NOTE — ASSESSMENT & PLAN NOTE
On metoprolol, likely for rate control with af.   Blood pressure acceptable.  Continue to monitor and adjust meds based on clinical course.

## 2024-09-04 ENCOUNTER — DOCUMENTATION (OUTPATIENT)
Dept: CARE COORDINATION | Facility: CLINIC | Age: 64
End: 2024-09-04
Payer: MEDICARE

## 2024-09-04 NOTE — PROGRESS NOTES
Reviewed patients medical record for collaborative care rounds with the insurance company.    Unable to reach patient at this time, no vm.  No further outreach at this time.    Kathryn GARCESN, RN, [facility]  Accountable Care Organization  O: [phone]

## 2024-09-27 ENCOUNTER — NURSING HOME VISIT (OUTPATIENT)
Dept: POST ACUTE CARE | Facility: EXTERNAL LOCATION | Age: 64
End: 2024-09-27
Payer: MEDICARE

## 2024-09-27 VITALS
SYSTOLIC BLOOD PRESSURE: 120 MMHG | OXYGEN SATURATION: 96 % | WEIGHT: 188 LBS | BODY MASS INDEX: 32.9 KG/M2 | DIASTOLIC BLOOD PRESSURE: 88 MMHG | RESPIRATION RATE: 17 BRPM | HEART RATE: 72 BPM | TEMPERATURE: 97.9 F

## 2024-09-27 DIAGNOSIS — I27.20 PULMONARY HYPERTENSION (MULTI): Chronic | ICD-10-CM

## 2024-09-27 DIAGNOSIS — M35.1 MIXED CONNECTIVE TISSUE DISEASE (MULTI): Primary | ICD-10-CM

## 2024-09-27 DIAGNOSIS — R26.2 AMBULATORY DYSFUNCTION: ICD-10-CM

## 2024-09-27 DIAGNOSIS — I42.9 CARDIOMYOPATHY, UNSPECIFIED TYPE (MULTI): ICD-10-CM

## 2024-09-27 DIAGNOSIS — R53.82 CHRONIC FATIGUE: ICD-10-CM

## 2024-09-27 PROCEDURE — 99308 SBSQ NF CARE LOW MDM 20: CPT | Performed by: INTERNAL MEDICINE

## 2024-09-27 NOTE — LETTER
Patient: Bing Holliday  : 1960    Encounter Date: 2024    Subjective  Patient ID: Bing Holliday is a 63 y.o. female who is long term resident being seen and evaluated for multiple medical problems.    HPI   This 63-year-old female patient is resting comfortably in her wheelchair in her room in no distress.  She has no reported complaints of pain or shortness of breath.  She appears to be in reasonably good spirits.  Nursing has no new adverse events reported to me.    Current high risk medication:  Insulin  Hydrocortisone  Keppra  CellCept    Laboratory examinations from most recent hospitalization 2024 been reviewed    Review of Systems   Constitutional:  Positive for activity change, appetite change and fatigue. Negative for chills, diaphoresis and fever.   Respiratory:  Negative for cough and shortness of breath.    Cardiovascular:  Negative for chest pain and leg swelling.   Gastrointestinal:  Negative for constipation, diarrhea, nausea and vomiting.   Musculoskeletal:  Negative for joint swelling and myalgias.   Psychiatric/Behavioral:  Negative for confusion.        Objective  /88   Pulse 72   Temp 36.6 °C (97.9 °F)   Resp 17   Wt 85.3 kg (188 lb)   LMP  (LMP Unknown)   SpO2 96%   BMI 32.90 kg/m²     Physical Exam  Vitals reviewed.   Constitutional:       General: She is not in acute distress.     Appearance: She is ill-appearing. She is not toxic-appearing.      Comments: Frail, cachectic, very weak.    Cardiovascular:      Rate and Rhythm: Normal rate and regular rhythm.      Pulses: Normal pulses.      Heart sounds:      No gallop.   Pulmonary:      Breath sounds: Normal breath sounds. No wheezing, rhonchi or rales.   Abdominal:      General: Abdomen is flat. Bowel sounds are normal.      Palpations: Abdomen is soft.      Tenderness: There is no guarding or rebound.   Musculoskeletal:      Left lower leg: No edema.      Comments: diffusely decreased muscle mass      Skin:     Comments: Patient has a lower extremity venous stasis chronic changes.    Neurological:      Motor: Weakness present.      Coordination: Coordination abnormal.         Assessment/Plan  Problem List Items Addressed This Visit             ICD-10-CM    Ambulatory dysfunction R26.2    Cardiomyopathy I42.9    Chronic fatigue R53.82    Pulmonary hypertension (Multi) (Chronic) I27.20    Mixed connective tissue disease (Multi) - Primary M35.1     8.  We will continue with restorative and supportive care as the patient tolerates    B.  Laboratory examinations will continue be monitored on an ongoing as-needed basis and should next be done in November 2024 as needed    C.  The patient appears to be with the long-term sequelae of a lifetime of mixed connective tissue disorders unfortunately.    D.  The patient's prognosis is poor.        Electronically Signed By: Jose Mcginnis MD   10/3/24  4:35 PM

## 2024-09-27 NOTE — PROGRESS NOTES
Subjective   Patient ID: Bing Holliday is a 63 y.o. female who is long term resident being seen and evaluated for multiple medical problems.    HPI   This 63-year-old female patient is resting comfortably in her wheelchair in her room in no distress.  She has no reported complaints of pain or shortness of breath.  She appears to be in reasonably good spirits.  Nursing has no new adverse events reported to me.    Current high risk medication:  Insulin  Hydrocortisone  Keppra  CellCept    Laboratory examinations from most recent hospitalization August 2024 been reviewed    Review of Systems   Constitutional:  Positive for activity change, appetite change and fatigue. Negative for chills, diaphoresis and fever.   Respiratory:  Negative for cough and shortness of breath.    Cardiovascular:  Negative for chest pain and leg swelling.   Gastrointestinal:  Negative for constipation, diarrhea, nausea and vomiting.   Musculoskeletal:  Negative for joint swelling and myalgias.   Psychiatric/Behavioral:  Negative for confusion.        Objective   /88   Pulse 72   Temp 36.6 °C (97.9 °F)   Resp 17   Wt 85.3 kg (188 lb)   LMP  (LMP Unknown)   SpO2 96%   BMI 32.90 kg/m²     Physical Exam  Vitals reviewed.   Constitutional:       General: She is not in acute distress.     Appearance: She is ill-appearing. She is not toxic-appearing.      Comments: Frail, cachectic, very weak.    Cardiovascular:      Rate and Rhythm: Normal rate and regular rhythm.      Pulses: Normal pulses.      Heart sounds:      No gallop.   Pulmonary:      Breath sounds: Normal breath sounds. No wheezing, rhonchi or rales.   Abdominal:      General: Abdomen is flat. Bowel sounds are normal.      Palpations: Abdomen is soft.      Tenderness: There is no guarding or rebound.   Musculoskeletal:      Left lower leg: No edema.      Comments: diffusely decreased muscle mass     Skin:     Comments: Patient has a lower extremity venous stasis chronic  changes.    Neurological:      Motor: Weakness present.      Coordination: Coordination abnormal.         Assessment/Plan   Problem List Items Addressed This Visit             ICD-10-CM    Ambulatory dysfunction R26.2    Cardiomyopathy I42.9    Chronic fatigue R53.82    Pulmonary hypertension (Multi) (Chronic) I27.20    Mixed connective tissue disease (Multi) - Primary M35.1     8.  We will continue with restorative and supportive care as the patient tolerates    B.  Laboratory examinations will continue be monitored on an ongoing as-needed basis and should next be done in November 2024 as needed    C.  The patient appears to be with the long-term sequelae of a lifetime of mixed connective tissue disorders unfortunately.    D.  The patient's prognosis is poor.

## 2024-09-30 ENCOUNTER — APPOINTMENT (OUTPATIENT)
Dept: CARDIOLOGY | Facility: HOSPITAL | Age: 64
End: 2024-09-30
Payer: MEDICARE

## 2024-09-30 ENCOUNTER — APPOINTMENT (OUTPATIENT)
Dept: RADIOLOGY | Facility: HOSPITAL | Age: 64
End: 2024-09-30
Payer: MEDICARE

## 2024-09-30 ENCOUNTER — HOSPITAL ENCOUNTER (INPATIENT)
Facility: HOSPITAL | Age: 64
End: 2024-09-30
Attending: STUDENT IN AN ORGANIZED HEALTH CARE EDUCATION/TRAINING PROGRAM | Admitting: INTERNAL MEDICINE
Payer: MEDICARE

## 2024-09-30 DIAGNOSIS — E44.0 MODERATE PROTEIN-CALORIE MALNUTRITION (MULTI): ICD-10-CM

## 2024-09-30 DIAGNOSIS — E16.2 HYPOGLYCEMIA: ICD-10-CM

## 2024-09-30 DIAGNOSIS — G93.40 ENCEPHALOPATHY, UNSPECIFIED TYPE: ICD-10-CM

## 2024-09-30 DIAGNOSIS — A41.9 SEPSIS, DUE TO UNSPECIFIED ORGANISM, UNSPECIFIED WHETHER ACUTE ORGAN DYSFUNCTION PRESENT (MULTI): Primary | ICD-10-CM

## 2024-09-30 LAB
ACANTHOCYTES BLD QL SMEAR: ABNORMAL
ALBUMIN SERPL BCP-MCNC: 3 G/DL (ref 3.4–5)
ALP SERPL-CCNC: 108 U/L (ref 33–136)
ALT SERPL W P-5'-P-CCNC: 8 U/L (ref 7–45)
ANION GAP SERPL CALC-SCNC: 16 MMOL/L (ref 10–20)
APPEARANCE UR: ABNORMAL
AST SERPL W P-5'-P-CCNC: 13 U/L (ref 9–39)
BACTERIA #/AREA URNS AUTO: ABNORMAL /HPF
BASOPHILS # BLD MANUAL: 0 X10*3/UL (ref 0–0.1)
BASOPHILS NFR BLD MANUAL: 0 %
BILIRUB SERPL-MCNC: 0.6 MG/DL (ref 0–1.2)
BILIRUB UR STRIP.AUTO-MCNC: NEGATIVE MG/DL
BNP SERPL-MCNC: 217 PG/ML (ref 0–99)
BUN SERPL-MCNC: 34 MG/DL (ref 6–23)
BURR CELLS BLD QL SMEAR: ABNORMAL
CALCIUM SERPL-MCNC: 8.9 MG/DL (ref 8.6–10.3)
CARDIAC TROPONIN I PNL SERPL HS: 42 NG/L (ref 0–13)
CARDIAC TROPONIN I PNL SERPL HS: 73 NG/L (ref 0–13)
CARDIAC TROPONIN I PNL SERPL HS: 81 NG/L (ref 0–13)
CARDIAC TROPONIN I PNL SERPL HS: 91 NG/L (ref 0–13)
CHLORIDE SERPL-SCNC: 108 MMOL/L (ref 98–107)
CO2 SERPL-SCNC: 26 MMOL/L (ref 21–32)
COLOR UR: YELLOW
CREAT SERPL-MCNC: 1.87 MG/DL (ref 0.5–1.05)
DACRYOCYTES BLD QL SMEAR: ABNORMAL
EGFRCR SERPLBLD CKD-EPI 2021: 30 ML/MIN/1.73M*2
EOSINOPHIL # BLD MANUAL: 0.06 X10*3/UL (ref 0–0.7)
EOSINOPHIL NFR BLD MANUAL: 1 %
ERYTHROCYTE [DISTWIDTH] IN BLOOD BY AUTOMATED COUNT: 23 % (ref 11.5–14.5)
FLUAV RNA RESP QL NAA+PROBE: NOT DETECTED
FLUBV RNA RESP QL NAA+PROBE: NOT DETECTED
GIANT PLATELETS BLD QL SMEAR: ABNORMAL
GLUCOSE BLD MANUAL STRIP-MCNC: 198 MG/DL (ref 74–99)
GLUCOSE BLD MANUAL STRIP-MCNC: 206 MG/DL (ref 74–99)
GLUCOSE BLD MANUAL STRIP-MCNC: 241 MG/DL (ref 74–99)
GLUCOSE BLD MANUAL STRIP-MCNC: 301 MG/DL (ref 74–99)
GLUCOSE SERPL-MCNC: 43 MG/DL (ref 74–99)
GLUCOSE UR STRIP.AUTO-MCNC: NORMAL MG/DL
HCT VFR BLD AUTO: 26.8 % (ref 36–46)
HGB BLD-MCNC: 7.9 G/DL (ref 12–16)
HOLD SPECIMEN: NORMAL
HYALINE CASTS #/AREA URNS AUTO: ABNORMAL /LPF
IMM GRANULOCYTES # BLD AUTO: 0.04 X10*3/UL (ref 0–0.7)
IMM GRANULOCYTES NFR BLD AUTO: 0.7 % (ref 0–0.9)
KETONES UR STRIP.AUTO-MCNC: NEGATIVE MG/DL
LACTATE SERPL-SCNC: 2 MMOL/L (ref 0.4–2)
LACTATE SERPL-SCNC: 3.1 MMOL/L (ref 0.4–2)
LACTATE SERPL-SCNC: 3.2 MMOL/L (ref 0.4–2)
LEUKOCYTE ESTERASE UR QL STRIP.AUTO: ABNORMAL
LIPASE SERPL-CCNC: 17 U/L (ref 9–82)
LYMPHOCYTES # BLD MANUAL: 0.99 X10*3/UL (ref 1.2–4.8)
LYMPHOCYTES NFR BLD MANUAL: 17 %
MAGNESIUM SERPL-MCNC: 1.58 MG/DL (ref 1.6–2.4)
MCH RBC QN AUTO: 28.2 PG (ref 26–34)
MCHC RBC AUTO-ENTMCNC: 29.5 G/DL (ref 32–36)
MCV RBC AUTO: 96 FL (ref 80–100)
MONOCYTES # BLD MANUAL: 0.23 X10*3/UL (ref 0.1–1)
MONOCYTES NFR BLD MANUAL: 4 %
MUCOUS THREADS #/AREA URNS AUTO: ABNORMAL /LPF
NEUTROPHILS # BLD MANUAL: 4.52 X10*3/UL (ref 1.2–7.7)
NEUTS BAND # BLD MANUAL: 1.16 X10*3/UL (ref 0–0.7)
NEUTS BAND NFR BLD MANUAL: 20 %
NEUTS SEG # BLD MANUAL: 3.36 X10*3/UL (ref 1.2–7)
NEUTS SEG NFR BLD MANUAL: 58 %
NITRITE UR QL STRIP.AUTO: ABNORMAL
NRBC BLD-RTO: 1 /100 WBCS (ref 0–0)
OVALOCYTES BLD QL SMEAR: ABNORMAL
PH UR STRIP.AUTO: 5.5 [PH]
PHOSPHATE SERPL-MCNC: 3.9 MG/DL (ref 2.5–4.9)
PLATELET # BLD AUTO: 64 X10*3/UL (ref 150–450)
POTASSIUM SERPL-SCNC: 3.6 MMOL/L (ref 3.5–5.3)
PROT SERPL-MCNC: 6 G/DL (ref 6.4–8.2)
PROT UR STRIP.AUTO-MCNC: ABNORMAL MG/DL
RBC # BLD AUTO: 2.8 X10*6/UL (ref 4–5.2)
RBC # UR STRIP.AUTO: ABNORMAL /UL
RBC #/AREA URNS AUTO: ABNORMAL /HPF
RBC MORPH BLD: ABNORMAL
SARS-COV-2 RNA RESP QL NAA+PROBE: NOT DETECTED
SCHISTOCYTES BLD QL SMEAR: ABNORMAL
SODIUM SERPL-SCNC: 146 MMOL/L (ref 136–145)
SP GR UR STRIP.AUTO: 1.02
TOTAL CELLS COUNTED BLD: 100
UROBILINOGEN UR STRIP.AUTO-MCNC: NORMAL MG/DL
WBC # BLD AUTO: 5.8 X10*3/UL (ref 4.4–11.3)
WBC #/AREA URNS AUTO: >50 /HPF
WBC CLUMPS #/AREA URNS AUTO: ABNORMAL /HPF

## 2024-09-30 PROCEDURE — 85027 COMPLETE CBC AUTOMATED: CPT | Performed by: STUDENT IN AN ORGANIZED HEALTH CARE EDUCATION/TRAINING PROGRAM

## 2024-09-30 PROCEDURE — 83690 ASSAY OF LIPASE: CPT | Performed by: STUDENT IN AN ORGANIZED HEALTH CARE EDUCATION/TRAINING PROGRAM

## 2024-09-30 PROCEDURE — 70450 CT HEAD/BRAIN W/O DYE: CPT

## 2024-09-30 PROCEDURE — 87449 NOS EACH ORGANISM AG IA: CPT | Mod: STJLAB

## 2024-09-30 PROCEDURE — 2500000004 HC RX 250 GENERAL PHARMACY W/ HCPCS (ALT 636 FOR OP/ED)

## 2024-09-30 PROCEDURE — 83880 ASSAY OF NATRIURETIC PEPTIDE: CPT | Performed by: STUDENT IN AN ORGANIZED HEALTH CARE EDUCATION/TRAINING PROGRAM

## 2024-09-30 PROCEDURE — 96374 THER/PROPH/DIAG INJ IV PUSH: CPT | Mod: 59

## 2024-09-30 PROCEDURE — 96365 THER/PROPH/DIAG IV INF INIT: CPT | Mod: 59

## 2024-09-30 PROCEDURE — 82947 ASSAY GLUCOSE BLOOD QUANT: CPT

## 2024-09-30 PROCEDURE — 93010 ELECTROCARDIOGRAM REPORT: CPT | Performed by: STUDENT IN AN ORGANIZED HEALTH CARE EDUCATION/TRAINING PROGRAM

## 2024-09-30 PROCEDURE — 87636 SARSCOV2 & INF A&B AMP PRB: CPT | Performed by: STUDENT IN AN ORGANIZED HEALTH CARE EDUCATION/TRAINING PROGRAM

## 2024-09-30 PROCEDURE — 99291 CRITICAL CARE FIRST HOUR: CPT | Performed by: STUDENT IN AN ORGANIZED HEALTH CARE EDUCATION/TRAINING PROGRAM

## 2024-09-30 PROCEDURE — 87040 BLOOD CULTURE FOR BACTERIA: CPT | Mod: STJLAB

## 2024-09-30 PROCEDURE — 36415 COLL VENOUS BLD VENIPUNCTURE: CPT

## 2024-09-30 PROCEDURE — 87205 SMEAR GRAM STAIN: CPT | Mod: STJLAB

## 2024-09-30 PROCEDURE — 84484 ASSAY OF TROPONIN QUANT: CPT | Performed by: STUDENT IN AN ORGANIZED HEALTH CARE EDUCATION/TRAINING PROGRAM

## 2024-09-30 PROCEDURE — 83735 ASSAY OF MAGNESIUM: CPT | Performed by: STUDENT IN AN ORGANIZED HEALTH CARE EDUCATION/TRAINING PROGRAM

## 2024-09-30 PROCEDURE — 3E033XZ INTRODUCTION OF VASOPRESSOR INTO PERIPHERAL VEIN, PERCUTANEOUS APPROACH: ICD-10-PCS | Performed by: STUDENT IN AN ORGANIZED HEALTH CARE EDUCATION/TRAINING PROGRAM

## 2024-09-30 PROCEDURE — 87899 AGENT NOS ASSAY W/OPTIC: CPT | Mod: STJLAB

## 2024-09-30 PROCEDURE — 71045 X-RAY EXAM CHEST 1 VIEW: CPT

## 2024-09-30 PROCEDURE — 80053 COMPREHEN METABOLIC PANEL: CPT | Performed by: STUDENT IN AN ORGANIZED HEALTH CARE EDUCATION/TRAINING PROGRAM

## 2024-09-30 PROCEDURE — 87077 CULTURE AEROBIC IDENTIFY: CPT | Mod: STJLAB

## 2024-09-30 PROCEDURE — 93005 ELECTROCARDIOGRAM TRACING: CPT

## 2024-09-30 PROCEDURE — 84145 PROCALCITONIN (PCT): CPT | Mod: STJLAB

## 2024-09-30 PROCEDURE — 51702 INSERT TEMP BLADDER CATH: CPT

## 2024-09-30 PROCEDURE — 36620 INSERTION CATHETER ARTERY: CPT | Performed by: NURSE PRACTITIONER

## 2024-09-30 PROCEDURE — 96375 TX/PRO/DX INJ NEW DRUG ADDON: CPT | Mod: 59

## 2024-09-30 PROCEDURE — 87186 SC STD MICRODIL/AGAR DIL: CPT | Mod: STJLAB | Performed by: STUDENT IN AN ORGANIZED HEALTH CARE EDUCATION/TRAINING PROGRAM

## 2024-09-30 PROCEDURE — 85007 BL SMEAR W/DIFF WBC COUNT: CPT | Performed by: STUDENT IN AN ORGANIZED HEALTH CARE EDUCATION/TRAINING PROGRAM

## 2024-09-30 PROCEDURE — 84100 ASSAY OF PHOSPHORUS: CPT | Performed by: STUDENT IN AN ORGANIZED HEALTH CARE EDUCATION/TRAINING PROGRAM

## 2024-09-30 PROCEDURE — 2500000005 HC RX 250 GENERAL PHARMACY W/O HCPCS

## 2024-09-30 PROCEDURE — 2020000001 HC ICU ROOM DAILY

## 2024-09-30 PROCEDURE — 84484 ASSAY OF TROPONIN QUANT: CPT

## 2024-09-30 PROCEDURE — 70450 CT HEAD/BRAIN W/O DYE: CPT | Performed by: RADIOLOGY

## 2024-09-30 PROCEDURE — 81001 URINALYSIS AUTO W/SCOPE: CPT | Performed by: STUDENT IN AN ORGANIZED HEALTH CARE EDUCATION/TRAINING PROGRAM

## 2024-09-30 PROCEDURE — 71045 X-RAY EXAM CHEST 1 VIEW: CPT | Mod: FOREIGN READ | Performed by: RADIOLOGY

## 2024-09-30 PROCEDURE — 2500000001 HC RX 250 WO HCPCS SELF ADMINISTERED DRUGS (ALT 637 FOR MEDICARE OP)

## 2024-09-30 PROCEDURE — 87186 SC STD MICRODIL/AGAR DIL: CPT | Mod: STJLAB

## 2024-09-30 PROCEDURE — 36415 COLL VENOUS BLD VENIPUNCTURE: CPT | Performed by: STUDENT IN AN ORGANIZED HEALTH CARE EDUCATION/TRAINING PROGRAM

## 2024-09-30 PROCEDURE — 87086 URINE CULTURE/COLONY COUNT: CPT | Mod: STJLAB | Performed by: STUDENT IN AN ORGANIZED HEALTH CARE EDUCATION/TRAINING PROGRAM

## 2024-09-30 PROCEDURE — 2500000004 HC RX 250 GENERAL PHARMACY W/ HCPCS (ALT 636 FOR OP/ED): Performed by: STUDENT IN AN ORGANIZED HEALTH CARE EDUCATION/TRAINING PROGRAM

## 2024-09-30 PROCEDURE — 83605 ASSAY OF LACTIC ACID: CPT

## 2024-09-30 PROCEDURE — 2500000005 HC RX 250 GENERAL PHARMACY W/O HCPCS: Performed by: STUDENT IN AN ORGANIZED HEALTH CARE EDUCATION/TRAINING PROGRAM

## 2024-09-30 PROCEDURE — 96361 HYDRATE IV INFUSION ADD-ON: CPT | Mod: 59

## 2024-09-30 PROCEDURE — 2500000002 HC RX 250 W HCPCS SELF ADMINISTERED DRUGS (ALT 637 FOR MEDICARE OP, ALT 636 FOR OP/ED)

## 2024-09-30 PROCEDURE — 87081 CULTURE SCREEN ONLY: CPT | Mod: STJLAB

## 2024-09-30 RX ORDER — VANCOMYCIN HYDROCHLORIDE 1 G/20ML
INJECTION, POWDER, LYOPHILIZED, FOR SOLUTION INTRAVENOUS DAILY PRN
Status: DISCONTINUED | OUTPATIENT
Start: 2024-09-30 | End: 2024-09-30

## 2024-09-30 RX ORDER — DEXTROSE 50 % IN WATER (D50W) INTRAVENOUS SYRINGE
25 ONCE
Status: COMPLETED | OUTPATIENT
Start: 2024-09-30 | End: 2024-09-30

## 2024-09-30 RX ORDER — HEPARIN SODIUM 5000 [USP'U]/ML
5000 INJECTION, SOLUTION INTRAVENOUS; SUBCUTANEOUS EVERY 8 HOURS
Status: DISPENSED | OUTPATIENT
Start: 2024-09-30

## 2024-09-30 RX ORDER — LIDOCAINE 560 MG/1
1 PATCH PERCUTANEOUS; TOPICAL; TRANSDERMAL DAILY
Status: ON HOLD | COMMUNITY

## 2024-09-30 RX ORDER — PANTOPRAZOLE SODIUM 40 MG/1
40 TABLET, DELAYED RELEASE ORAL
Status: DISCONTINUED | OUTPATIENT
Start: 2024-10-01 | End: 2024-10-01

## 2024-09-30 RX ORDER — SERTRALINE HYDROCHLORIDE 25 MG/1
25 TABLET, FILM COATED ORAL DAILY
Status: ON HOLD | COMMUNITY

## 2024-09-30 RX ORDER — DEXTROSE 50 % IN WATER (D50W) INTRAVENOUS SYRINGE
Status: COMPLETED
Start: 2024-09-30 | End: 2024-09-30

## 2024-09-30 RX ORDER — DEXTROSE 50 % IN WATER (D50W) INTRAVENOUS SYRINGE
12.5
Status: ACTIVE | OUTPATIENT
Start: 2024-09-30

## 2024-09-30 RX ORDER — ESOMEPRAZOLE MAGNESIUM 40 MG/1
40 GRANULE, DELAYED RELEASE ORAL
Status: DISCONTINUED | OUTPATIENT
Start: 2024-10-01 | End: 2024-10-01

## 2024-09-30 RX ORDER — NOREPINEPHRINE BITARTRATE/D5W 8 MG/250ML
0-.5 PLASTIC BAG, INJECTION (ML) INTRAVENOUS CONTINUOUS
Status: DISCONTINUED | OUTPATIENT
Start: 2024-09-30 | End: 2024-10-02

## 2024-09-30 RX ORDER — DEXTROSE, SODIUM CHLORIDE, SODIUM LACTATE, POTASSIUM CHLORIDE, AND CALCIUM CHLORIDE 5; .6; .31; .03; .02 G/100ML; G/100ML; G/100ML; G/100ML; G/100ML
75 INJECTION, SOLUTION INTRAVENOUS CONTINUOUS
Status: DISCONTINUED | OUTPATIENT
Start: 2024-09-30 | End: 2024-09-30

## 2024-09-30 RX ORDER — ASPIRIN 300 MG/1
300 SUPPOSITORY RECTAL ONCE
Status: COMPLETED | OUTPATIENT
Start: 2024-09-30 | End: 2024-09-30

## 2024-09-30 RX ORDER — PANTOPRAZOLE SODIUM 40 MG/10ML
40 INJECTION, POWDER, LYOPHILIZED, FOR SOLUTION INTRAVENOUS
Status: DISCONTINUED | OUTPATIENT
Start: 2024-10-01 | End: 2024-10-06

## 2024-09-30 RX ORDER — PALIPERIDONE 6 MG/1
6 TABLET, EXTENDED RELEASE ORAL EVERY MORNING
Status: ON HOLD | COMMUNITY

## 2024-09-30 RX ORDER — CEFTRIAXONE 1 G/50ML
1 INJECTION, SOLUTION INTRAVENOUS EVERY 24 HOURS
Status: DISCONTINUED | OUTPATIENT
Start: 2024-09-30 | End: 2024-10-01

## 2024-09-30 RX ORDER — NOREPINEPHRINE BITARTRATE/D5W 8 MG/250ML
PLASTIC BAG, INJECTION (ML) INTRAVENOUS
Status: COMPLETED
Start: 2024-09-30 | End: 2024-09-30

## 2024-09-30 RX ORDER — DEXTROSE 50 % IN WATER (D50W) INTRAVENOUS SYRINGE
25
Status: ACTIVE | OUTPATIENT
Start: 2024-09-30

## 2024-09-30 RX ORDER — DEXTROSE, SODIUM CHLORIDE, SODIUM LACTATE, POTASSIUM CHLORIDE, AND CALCIUM CHLORIDE 5; .6; .31; .03; .02 G/100ML; G/100ML; G/100ML; G/100ML; G/100ML
75 INJECTION, SOLUTION INTRAVENOUS CONTINUOUS
Status: DISCONTINUED | OUTPATIENT
Start: 2024-09-30 | End: 2024-10-01

## 2024-09-30 RX ORDER — INSULIN LISPRO 100 [IU]/ML
0-5 INJECTION, SOLUTION INTRAVENOUS; SUBCUTANEOUS EVERY 4 HOURS
Status: DISCONTINUED | OUTPATIENT
Start: 2024-09-30 | End: 2024-10-01

## 2024-09-30 RX ORDER — ACETAMINOPHEN 325 MG/1
650 TABLET ORAL EVERY 4 HOURS PRN
Status: ON HOLD | COMMUNITY

## 2024-09-30 SDOH — SOCIAL STABILITY: SOCIAL INSECURITY: HAS ANYONE EVER THREATENED TO HURT YOUR FAMILY OR YOUR PETS?: UNABLE TO ASSESS

## 2024-09-30 SDOH — ECONOMIC STABILITY: TRANSPORTATION INSECURITY
IN THE PAST 12 MONTHS, HAS LACK OF TRANSPORTATION KEPT YOU FROM MEDICAL APPOINTMENTS OR FROM GETTING MEDICATIONS?: PATIENT UNABLE TO ANSWER

## 2024-09-30 SDOH — ECONOMIC STABILITY: FOOD INSECURITY
WITHIN THE PAST 12 MONTHS, YOU WORRIED THAT YOUR FOOD WOULD RUN OUT BEFORE YOU GOT THE MONEY TO BUY MORE.: PATIENT UNABLE TO ANSWER

## 2024-09-30 SDOH — ECONOMIC STABILITY: HOUSING INSECURITY: AT ANY TIME IN THE PAST 12 MONTHS, WERE YOU HOMELESS OR LIVING IN A SHELTER (INCLUDING NOW)?: PATIENT UNABLE TO ANSWER

## 2024-09-30 SDOH — SOCIAL STABILITY: SOCIAL INSECURITY: WITHIN THE LAST YEAR, HAVE YOU BEEN AFRAID OF YOUR PARTNER OR EX-PARTNER?: PATIENT UNABLE TO ANSWER

## 2024-09-30 SDOH — SOCIAL STABILITY: SOCIAL INSECURITY: HAVE YOU HAD THOUGHTS OF HARMING ANYONE ELSE?: UNABLE TO ASSESS

## 2024-09-30 SDOH — ECONOMIC STABILITY: HOUSING INSECURITY: IN THE PAST 12 MONTHS, HOW MANY TIMES HAVE YOU MOVED WHERE YOU WERE LIVING?: 1

## 2024-09-30 SDOH — SOCIAL STABILITY: SOCIAL INSECURITY: DO YOU FEEL ANYONE HAS EXPLOITED OR TAKEN ADVANTAGE OF YOU FINANCIALLY OR OF YOUR PERSONAL PROPERTY?: UNABLE TO ASSESS

## 2024-09-30 SDOH — SOCIAL STABILITY: SOCIAL INSECURITY: DO YOU FEEL UNSAFE GOING BACK TO THE PLACE WHERE YOU ARE LIVING?: UNABLE TO ASSESS

## 2024-09-30 SDOH — SOCIAL STABILITY: SOCIAL INSECURITY: DOES ANYONE TRY TO KEEP YOU FROM HAVING/CONTACTING OTHER FRIENDS OR DOING THINGS OUTSIDE YOUR HOME?: UNABLE TO ASSESS

## 2024-09-30 SDOH — SOCIAL STABILITY: SOCIAL INSECURITY: ABUSE: ADULT

## 2024-09-30 SDOH — SOCIAL STABILITY: SOCIAL INSECURITY: WERE YOU ABLE TO COMPLETE ALL THE BEHAVIORAL HEALTH SCREENINGS?: YES

## 2024-09-30 SDOH — SOCIAL STABILITY: SOCIAL INSECURITY: ARE YOU OR HAVE YOU BEEN THREATENED OR ABUSED PHYSICALLY, EMOTIONALLY, OR SEXUALLY BY ANYONE?: UNABLE TO ASSESS

## 2024-09-30 SDOH — SOCIAL STABILITY: SOCIAL INSECURITY
WITHIN THE LAST YEAR, HAVE YOU BEEN RAPED OR FORCED TO HAVE ANY KIND OF SEXUAL ACTIVITY BY YOUR PARTNER OR EX-PARTNER?: PATIENT UNABLE TO ANSWER

## 2024-09-30 SDOH — SOCIAL STABILITY: SOCIAL INSECURITY: ARE THERE ANY APPARENT SIGNS OF INJURIES/BEHAVIORS THAT COULD BE RELATED TO ABUSE/NEGLECT?: UNABLE TO ASSESS

## 2024-09-30 SDOH — ECONOMIC STABILITY: INCOME INSECURITY
IN THE PAST 12 MONTHS HAS THE ELECTRIC, GAS, OIL, OR WATER COMPANY THREATENED TO SHUT OFF SERVICES IN YOUR HOME?: PATIENT UNABLE TO ANSWER

## 2024-09-30 ASSESSMENT — COGNITIVE AND FUNCTIONAL STATUS - GENERAL
EATING MEALS: TOTAL
MOVING TO AND FROM BED TO CHAIR: TOTAL
MOBILITY SCORE: 6
TURNING FROM BACK TO SIDE WHILE IN FLAT BAD: TOTAL
WALKING IN HOSPITAL ROOM: TOTAL
TOILETING: TOTAL
TOILETING: TOTAL
PATIENT BASELINE BEDBOUND: UNABLE TO ASSESS AT THIS TIME
MOVING FROM LYING ON BACK TO SITTING ON SIDE OF FLAT BED WITH BEDRAILS: TOTAL
MOVING TO AND FROM BED TO CHAIR: TOTAL
DRESSING REGULAR LOWER BODY CLOTHING: TOTAL
PERSONAL GROOMING: TOTAL
DRESSING REGULAR UPPER BODY CLOTHING: TOTAL
HELP NEEDED FOR BATHING: TOTAL
HELP NEEDED FOR BATHING: TOTAL
MOVING FROM LYING ON BACK TO SITTING ON SIDE OF FLAT BED WITH BEDRAILS: TOTAL
STANDING UP FROM CHAIR USING ARMS: TOTAL
PERSONAL GROOMING: TOTAL
PERSONAL GROOMING: TOTAL
CLIMB 3 TO 5 STEPS WITH RAILING: TOTAL
DRESSING REGULAR UPPER BODY CLOTHING: TOTAL
DRESSING REGULAR UPPER BODY CLOTHING: TOTAL
STANDING UP FROM CHAIR USING ARMS: TOTAL
WALKING IN HOSPITAL ROOM: TOTAL
TOILETING: TOTAL
CLIMB 3 TO 5 STEPS WITH RAILING: TOTAL
DAILY ACTIVITIY SCORE: 6
DRESSING REGULAR LOWER BODY CLOTHING: TOTAL
CLIMB 3 TO 5 STEPS WITH RAILING: TOTAL
WALKING IN HOSPITAL ROOM: TOTAL
EATING MEALS: TOTAL
MOVING TO AND FROM BED TO CHAIR: TOTAL
DAILY ACTIVITIY SCORE: 6
DRESSING REGULAR LOWER BODY CLOTHING: TOTAL
STANDING UP FROM CHAIR USING ARMS: TOTAL
DAILY ACTIVITIY SCORE: 6
MOVING FROM LYING ON BACK TO SITTING ON SIDE OF FLAT BED WITH BEDRAILS: TOTAL
TURNING FROM BACK TO SIDE WHILE IN FLAT BAD: TOTAL
MOBILITY SCORE: 6
MOBILITY SCORE: 6
EATING MEALS: TOTAL
TURNING FROM BACK TO SIDE WHILE IN FLAT BAD: TOTAL
HELP NEEDED FOR BATHING: TOTAL

## 2024-09-30 ASSESSMENT — ACTIVITIES OF DAILY LIVING (ADL)
JUDGMENT_ADEQUATE_SAFELY_COMPLETE_DAILY_ACTIVITIES: UNABLE TO ASSESS
ASSISTIVE_DEVICE: OTHER (COMMENT)
LACK_OF_TRANSPORTATION: PATIENT UNABLE TO ANSWER
ADEQUATE_TO_COMPLETE_ADL: UNABLE TO ASSESS
HEARING - RIGHT EAR: UNABLE TO ASSESS
DRESSING YOURSELF: DEPENDENT
GROOMING: DEPENDENT
TOILETING: DEPENDENT
WALKS IN HOME: DEPENDENT
FEEDING YOURSELF: DEPENDENT
PATIENT'S MEMORY ADEQUATE TO SAFELY COMPLETE DAILY ACTIVITIES?: UNABLE TO ASSESS
BATHING: DEPENDENT
LACK_OF_TRANSPORTATION: PATIENT UNABLE TO ANSWER
HEARING - LEFT EAR: UNABLE TO ASSESS

## 2024-09-30 ASSESSMENT — PAIN SCALES - PAIN ASSESSMENT IN ADVANCED DEMENTIA (PAINAD)
CONSOLABILITY: NO NEED TO CONSOLE
BREATHING: NORMAL
FACIALEXPRESSION: SMILING OR INEXPRESSIVE
BODYLANGUAGE: RELAXED
NEGVOCALIZATION: OCCASIONAL MOAN/GROAN, LOW SPEECH, NEGATIVE/DISAPPROVING QUALITY
TOTALSCORE: 0
TOTALSCORE: 1
BODYLANGUAGE: RELAXED
FACIALEXPRESSION: SMILING OR INEXPRESSIVE
TOTALSCORE: REPOSITIONED
BREATHING: NORMAL
CONSOLABILITY: NO NEED TO CONSOLE

## 2024-09-30 ASSESSMENT — LIFESTYLE VARIABLES
HOW OFTEN DO YOU HAVE 6 OR MORE DRINKS ON ONE OCCASION: PATIENT UNABLE TO ANSWER
EVER FELT BAD OR GUILTY ABOUT YOUR DRINKING: NO
SKIP TO QUESTIONS 9-10: 0
AUDIT-C TOTAL SCORE: -1
HAVE PEOPLE ANNOYED YOU BY CRITICIZING YOUR DRINKING: NO
HAVE YOU EVER FELT YOU SHOULD CUT DOWN ON YOUR DRINKING: NO
AUDIT-C TOTAL SCORE: -1
HOW OFTEN DO YOU HAVE A DRINK CONTAINING ALCOHOL: PATIENT UNABLE TO ANSWER
HOW MANY STANDARD DRINKS CONTAINING ALCOHOL DO YOU HAVE ON A TYPICAL DAY: PATIENT UNABLE TO ANSWER
EVER HAD A DRINK FIRST THING IN THE MORNING TO STEADY YOUR NERVES TO GET RID OF A HANGOVER: NO
TOTAL SCORE: 0

## 2024-09-30 ASSESSMENT — PAIN - FUNCTIONAL ASSESSMENT
PAIN_FUNCTIONAL_ASSESSMENT: PAINAD (PAIN ASSESSMENT IN ADVANCED DEMENTIA SCALE)
PAIN_FUNCTIONAL_ASSESSMENT: CPOT (CRITICAL CARE PAIN OBSERVATION TOOL)
PAIN_FUNCTIONAL_ASSESSMENT: PAINAD (PAIN ASSESSMENT IN ADVANCED DEMENTIA SCALE)

## 2024-09-30 NOTE — CARE PLAN
Problem: Pain - Adult  Goal: Verbalizes/displays adequate comfort level or baseline comfort level  Outcome: Progressing     Problem: Safety - Adult  Goal: Free from fall injury  Outcome: Progressing     Problem: Discharge Planning  Goal: Discharge to home or other facility with appropriate resources  Outcome: Progressing     Problem: Chronic Conditions and Co-morbidities  Goal: Patient's chronic conditions and co-morbidity symptoms are monitored and maintained or improved  Outcome: Progressing     Problem: Fall/Injury  Goal: Verbalize understanding of personal risk factors for fall in the hospital  Outcome: Progressing  Goal: Verbalize understanding of risk factor reduction measures to prevent injury from fall in the home  Outcome: Progressing     Problem: Skin  Goal: Decreased wound size/increased tissue granulation at next dressing change  Outcome: Progressing  Flowsheets (Taken 9/30/2024 1848)  Decreased wound size/increased tissue granulation at next dressing change: Promote sleep for wound healing  Goal: Participates in plan/prevention/treatment measures  Outcome: Progressing  Flowsheets (Taken 9/30/2024 1848)  Participates in plan/prevention/treatment measures: Elevate heels  Goal: Prevent/manage excess moisture  Outcome: Progressing  Flowsheets (Taken 9/30/2024 1848)  Prevent/manage excess moisture: Cleanse incontinence/protect with barrier cream  Goal: Prevent/minimize sheer/friction injuries  Outcome: Progressing  Flowsheets (Taken 9/30/2024 1848)  Prevent/minimize sheer/friction injuries: Turn/reposition every 2 hours/use positioning/transfer devices  Goal: Promote skin healing  Outcome: Progressing  Flowsheets (Taken 9/30/2024 1848)  Promote skin healing: Turn/reposition every 2 hours/use positioning/transfer devices     Problem: Infection prevention/bleeding  Goal: No further progression of infection  Outcome: Progressing     Problem: Perfusion/Cardiac  Goal: Adequate perfusion to  organs/extremities  Outcome: Progressing  Goal: Hemodynamically stable  Outcome: Progressing  Goal: No cardiac arrhythmias  Outcome: Progressing     Problem: Indwelling Catheter Maintenance  Goal: I will have no complications from indwelling catheter  Outcome: Progressing     Problem: Fall/Injury  Goal: Not fall by end of shift  Outcome: Met  Goal: Be free from injury by end of the shift  Outcome: Met     Problem: Diabetes  Goal: Maintain glucose levels >70mg/dl to <250mg/dl throughout shift  Outcome: Met   The patient's goals for the shift include      The clinical goals for the shift include Patient will have decreasing levophed requriements by the end of this shift    Over the shift, the patient did not make progress toward the following goals. Barriers to progression include acuteness of illness. Recommendations to address these barriers include continue with the plan of care.

## 2024-09-30 NOTE — PROGRESS NOTES
"Vancomycin Dosing by Pharmacy - Emergency Department    Bing Holliday is a 63 y.o. female who pharmacy has been consulted for vancomycin one-time dosing for other empiric  by an Emergency Department (ED) provider.    Estimated Creatinine Clearance: 32.9 mL/min (A) (by C-G formula based on SCr of 1.87 mg/dL (H)).    HD/PD/JED: Yes - baseline Scr 1.1-1.4    Blood pressure (!) 47/39, pulse 72, temperature 36.5 °C (97.7 °F), resp. rate (!) 25, height 1.626 m (5' 4\"), weight 85.3 kg (188 lb 0.8 oz), SpO2 97%.    Concern for Severe Sepsis and/or Septic Shock: Yes    Assessment/Plan:  Patient will be given a loading dose of 1250 mg.   Will initiate vancomycin ED dose, 1250 mg (15 mg/kg) x 1 dose.  Pharmacy will now discontinue the one time dose consult. Maintenance dose and continuation of vancomycin to be determined by the admitting team. If vancomycin appropriate for continuation, another pharmacy to dose vancomycin consult will be placed at that time.      Thank you for allowing me to take part in the care of this patient. Please contact pharmacy with any questions.    Ainsley Torres   9/30/2024 12:34 PM       "

## 2024-09-30 NOTE — ED PROVIDER NOTES
EMERGENCY DEPARTMENT ENCOUNTER      Pt Name: Bing Holliday  MRN: [id number]  Birthdate 1960  Date of evaluation: 9/30/2024  Provider: Antelmo Maki DO    CHIEF COMPLAINT       Chief Complaint   Patient presents with    Altered Mental Status    Hypotension    Hypoglycemia         HISTORY OF PRESENT ILLNESS    HPI    63-year-old female with past medical history significant for lupus with lupus nephritis and CKD 3, adrenal insufficiency, HFpEF, recurrent urinary tract infections presenting to the Emergency Department for evaluation of altered mental status.  Per EMS report at the nursing home patient has been more confused from her baseline and hallucinating and nursing home thought it might be due to her blood sugar which been running borderline low over the past couple days.  EMS advised blood sugar was 70 at the nursing home and 75 for them started patient on D10 which patient received 200 cc.  Also started patient on normal saline given patient's systolic blood pressure was in the 70s.  Patient has severe dementia and does not answer to questioning and is not able to provide HPI.    Nursing Notes were reviewed.    PAST MEDICAL HISTORY     Past Medical History:   Diagnosis Date    Abnormal kidney function 04/04/2023    Acute headache 03/10/2024    Acute iritis of right eye 07/24/2015    Acute low back pain 10/30/2023    Acute upper respiratory infection, unspecified 03/04/2020    Acute URI    Acute upper respiratory infection, unspecified 09/30/2015    URTI (acute upper respiratory infection)    Arthralgia of right knee 02/14/2024    Arthritis     Atypical facial pain 03/10/2024    Body mass index (BMI) 23.0-23.9, adult 10/15/2021    BMI 23.0-23.9, adult    Body mass index (BMI) 33.0-33.9, adult 03/04/2020    BMI 33.0-33.9,adult    Cardiomegaly 08/27/2013    Left ventricular hypertrophy    Chest pain 06/10/2022    Comment on above: Added by Problem List Migration; 2013-3-12; Moved to [facility] Nov 25  2013 9:16PM; Comment on above: Added by Problem List Migration; 2013-3-12; Moved to [facility] Nov 25 2013 9:16PM;    Chronic kidney disease, stage 3 unspecified (Multi) 07/02/2013    Chronic kidney disease, stage III (moderate)    Confusional state 03/10/2024    Contact with and (suspected) exposure to covid-19 04/04/2023    Delirium 03/10/2024    Disease of pericardium, unspecified ([facility]) 07/02/2013    Pericardial disease    Encounter for follow-up examination after completed treatment for conditions other than malignant neoplasm 10/06/2022    Hospital discharge follow-up    Generalized contraction of visual field, right eye 01/29/2015    Generalized contraction of visual field of right eye    Hearing loss 03/10/2024    History of cataract 03/10/2024    History of thrombocytopenia 03/10/2024    Comment on above: Added by Problem List Migration; 2013-7-2;    Homonymous bilateral field defects, right side 04/29/2016    Homonymous bilateral field defects of right side    Hypertensive chronic kidney disease with stage 1 through stage 4 chronic kidney disease, or unspecified chronic kidney disease 07/02/2013    Nephrosclerosis    Laceration without foreign body, left foot, initial encounter 07/03/2018    Foot laceration, left, initial encounter    Localized edema 03/10/2024    Localized, primary osteoarthritis 02/14/2024    Mass of skin 03/10/2024    Migraine with aura, not intractable, without status migrainosus 10/24/2022    Ocular migraine    Open wound 03/10/2024    Open wound of left foot 03/10/2024    Other conditions influencing health status 07/02/2013    Chronic Glomerulonephritis In Diseases Classified Elsewhere    Other conditions influencing health status 07/02/2013    Progressive Familial Myoclonic Epilepsy    Other conditions influencing health status 07/02/2013    Protein S Deficiency    Other conditions influencing health status 05/22/2015    Familial Combined Hyperlipidemia    Other conditions  influencing health status 10/24/2022    IDDM (insulin dependent diabetes mellitus)    Other conditions influencing health status 03/14/2022    Diabetes mellitus, insulin dependent (IDDM), uncontrolled    Other long term (current) drug therapy 10/24/2022    Long-term use of Plaquenil    Overweight with body mass index (BMI) 25.0-29.9 03/10/2024    Pain of toe 03/10/2024    Personal history of COVID-19 04/04/2023    Personal history of diseases of the blood and blood-forming organs and certain disorders involving the immune mechanism 07/02/2013    History of thrombocytopenia    Personal history of diseases of the skin and subcutaneous tissue 08/11/2015    History of foot ulcer    Personal history of nephrotic syndrome 07/02/2013    History of nephrotic syndrome    Personal history of other diseases of the circulatory system 08/27/2013    History of sinus tachycardia    Personal history of other diseases of the nervous system and sense organs     History of cataract    Personal history of other diseases of the respiratory system     History of bronchitis    Personal history of other infectious and parasitic diseases 07/02/2013    History of hepatitis    Personal history of other specified conditions     History of shortness of breath    Personal history of other specified conditions 08/27/2013    History of edema    Posterior epistaxis 03/10/2024    Puckering of macula, right eye 10/24/2022    ERM OD (epiretinal membrane, right eye)    Raynaud's syndrome without gangrene 07/02/2013    Raynaud's disease    Sinusitis 03/10/2024    Systemic lupus erythematosus, unspecified (Multi) 07/24/2015    SLE (systemic lupus erythematosus)    Systemic lupus erythematosus, unspecified (Multi) 07/24/2015    SLE (systemic lupus erythematosus)    Systemic lupus erythematosus, unspecified (Multi) 07/24/2015    Systemic lupus    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) 07/02/2013    Type 2 diabetes with nephropathy     Type 2 diabetes mellitus with mild nonproliferative diabetic retinopathy without macular edema, left eye (Multi) 07/27/2015    Non-proliferative diabetic retinopathy, left eye    Type 2 diabetes mellitus with mild nonproliferative diabetic retinopathy without macular edema, unspecified eye (Multi) 07/24/2015    Mild non proliferative diabetic retinopathy    Type 2 diabetes mellitus with proliferative diabetic retinopathy without macular edema, right eye (Multi) 07/27/2015    Proliferative diabetic retinopathy of right eye    Type 2 diabetes mellitus with proliferative diabetic retinopathy without macular edema, unspecified eye (Multi) 07/24/2015    Diabetic proliferative retinopathy    Unspecified acute and subacute iridocyclitis 07/24/2015    Acute iritis, right eye    Unspecified open wound, left foot, sequela 07/03/2018    Wound, open, foot, left, sequela         SURGICAL HISTORY       Past Surgical History:   Procedure Laterality Date    ANKLE SURGERY  01/29/2015    Ankle Surgery    CARDIAC ELECTROPHYSIOLOGY PROCEDURE Left 5/17/2024    Procedure: PPM IMPLANT DUAL;  Surgeon: Antonio Rodriges MD;  Location: [facility];  Service: Electrophysiology;  Laterality: Left;  Pt. needs platelets and Prednisone prior to procedure    CHOLECYSTECTOMY  01/29/2015    Cholecystectomy    CT GUIDED PERCUTANEOUS BIOPSY BONE DEEP  5/4/2021    CT GUIDED PERCUTANEOUS BIOPSY BONE DEEP 5/4/2021 [facility] CLINICAL LEGACY    EYE SURGERY  03/06/2015    Eye Surgery    FOOT SURGERY  01/29/2015    Foot Surgery    MR HEAD ANGIO WO IV CONTRAST  7/26/2013    MR HEAD ANGIO WO IV CONTRAST 7/26/2013 [facility] CLINICAL LEGACY    MR HEAD ANGIO WO IV CONTRAST  9/17/2021    MR HEAD ANGIO WO IV CONTRAST 9/17/2021 [facility]GAC[facility]    MR HEAD ANGIO WO IV CONTRAST  3/25/2023    MR HEAD ANGIO WO IV CONTRAST [facility]    MR NECK ANGIO WO IV CONTRAST  7/26/2013    MR NECK ANGIO WO IV CONTRAST 7/26/2013 [facility] CLINICAL LEGACY    MR NECK ANGIO WO IV CONTRAST   9/17/2021    MR NECK ANGIO WO IV CONTRAST 9/17/2021 [facility]    MR NECK ANGIO WO IV CONTRAST  3/25/2023    MR NECK ANGIO WO IV CONTRAST [facility]    OTHER SURGICAL HISTORY  01/29/2015    Creation Of Pericardial Window    OTHER SURGICAL HISTORY  01/29/2015    Quadricepsplasty    TOTAL HIP ARTHROPLASTY  01/29/2015    Hip Replacement         CURRENT MEDICATIONS       Current Discharge Medication List        CONTINUE these medications which have NOT CHANGED    Details   acetaminophen (Tylenol) 325 mg tablet Take 2 tablets (650 mg) by mouth every 4 hours if needed for mild pain (1 - 3) or fever (temp greater than 38.0 C).      atorvastatin (Lipitor) 40 mg tablet Take 1 tablet (40 mg) by mouth once daily at bedtime.    Associated Diagnoses: Other hyperlipidemia      blood-glucose sensor (Dexcom G6 Sensor) device Use to check sugars 3 times daily  Qty: 4 each, Refills: 2    Associated Diagnoses: Uncontrolled diabetes mellitus with hyperglycemia, without long-term current use of insulin      budesonide (Pulmicort) 0.5 mg/2 mL nebulizer solution Take 2 mL (0.5 mg) by nebulization 2 times a day. Rinse mouth with water after use to reduce aftertaste and incidence of candidiasis. Do not swallow.    Associated Diagnoses: Shortness of breath on exertion      Dexcom G4 platinum  (Dexcom G6 ) misc Use as instructed  Qty: 1 each, Refills: 0    Associated Diagnoses: Uncontrolled diabetes mellitus with hyperglycemia, without long-term current use of insulin      Dexcom G4 platinum transmitter (Dexcom G6 Transmitter) device Use as instructed  Qty: 1 each, Refills: 0    Associated Diagnoses: Uncontrolled diabetes mellitus with hyperglycemia, without long-term current use of insulin      furosemide (Lasix) 40 mg tablet Take 1 tablet (40 mg) by mouth once daily.  Qty: 30 tablet, Refills: 3    Associated Diagnoses: Lupus      heparin sodium,porcine (heparin, porcine,) 5,000 unit/mL injection Inject 1 mL (5,000  "Units) under the skin every 8 hours.  Qty: 90 mL, Refills: 11    Associated Diagnoses: Lupus      !! hydrocortisone (Cortef) 10 mg tablet Take 1 tablet (10 mg) by mouth once daily in the evening.  Qty: 30 tablet, Refills: 3    Associated Diagnoses: Lupus      !! hydrocortisone (Cortef) 20 mg tablet Take 1 tablet (20 mg) by mouth once daily in the morning.  Qty: 30 tablet, Refills: 3    Associated Diagnoses: Lupus      insulin lispro (HumaLOG) 100 unit/mL injection Inject 0-10 Units under the skin 3 times a day as needed for high blood sugar (before meals). Take as directed per insulin Sliding Scale instructions.  0-150 = 0 units  151-200 = 2 units  201-250 = 4 units  251-300 = 6 units  301-350 = 8 units  351-400 = 10 units  >400 = give 10 units and contact MD      insulin syringe,safetyneedle 29G X 1/2\" 0.5 mL syringe Use to inject 1-4 times daily.  Qty: 100 each, Refills: 11    Associated Diagnoses: DM type 2 with diabetic peripheral neuropathy      ipratropium-albuteroL (Duo-Neb) 0.5-2.5 mg/3 mL nebulizer solution Take 3 mL by nebulization every 4 hours if needed for wheezing or shortness of breath.  Qty: 180 mL, Refills: 11    Associated Diagnoses: Shortness of breath on exertion      levETIRAcetam (Keppra) 500 mg tablet Take 1 tablet (500 mg) by mouth 2 times a day.    Associated Diagnoses: Seizure (Multi)      lidocaine 4 % patch Place 1 patch on the skin once daily. Remove & discard patch within 12 hours or as directed by MD.      melatonin 5 mg tablet Take 1 tablet (5 mg) by mouth once daily at bedtime.    Associated Diagnoses: Insomnia, unspecified type      metoprolol tartrate (Lopressor) 25 mg tablet Take 1 tablet (25 mg) by mouth 2 times a day.  Qty: 180 tablet, Refills: 3    Comments: INCREASE in dose  Associated Diagnoses: PAF (paroxysmal atrial fibrillation) (Multi)      mycophenolate (Cellcept) 250 mg capsule Take 4 capsules (1,000 mg) by mouth 2 times a day.    Associated Diagnoses: Systemic lupus " "erythematosus, organ or system involvement unspecified (Multi); Lupus nephritis (Multi)      paliperidone (Invega) 6 mg 24 hr tablet Take 1 tablet (6 mg) by mouth once daily in the morning. Do not crush, chew, or split.      pantoprazole (ProtoNix) 40 mg EC tablet Take 1 tablet (40 mg) by mouth once daily in the morning. Take before meals. Do not crush, chew, or split.    Associated Diagnoses: Ulcerative (chronic) pancolitis with rectal bleeding (Multi)      pen needle, diabetic 31 gauge x 5/16\" needle Use to inject 1-4 times daily as directed.  Qty: 100 each, Refills: 11    Associated Diagnoses: DM type 2 with diabetic peripheral neuropathy      polyethylene glycol (Glycolax, Miralax) 17 gram packet Take 17 g by mouth once daily.    Associated Diagnoses: Acute diverticulitis      sertraline (Zoloft) 25 mg tablet Take 1 tablet (25 mg) by mouth once daily.       !! - Potential duplicate medications found. Please discuss with provider.          ALLERGIES     Ace inhibitors, Hydroxychloroquine, Lisinopril, Sulfa (sulfonamide antibiotics), and Penicillins    FAMILY HISTORY       Family History   Problem Relation Name Age of Onset    Other (RENAL DISEASE) Mother          END STAGE    Other (CARDIAC DISORDER) Mother      Cataracts Mother      Stroke Mother      Diabetes Mother      Kidney disease Mother      Lupus Mother      Other (CARDIAC DISORDER) Father      COPD Father      Glaucoma Father      Hypertension Father      Sleep apnea Father      Other (CARDIAC DISORDER) Sister      Depression Sister      Kidney disease Sister      Sickle cell trait Sister      Sleep apnea Daughter            SOCIAL HISTORY       Social History     Socioeconomic History    Marital status:    Tobacco Use    Smoking status: Never     Passive exposure: Never    Smokeless tobacco: Never   Vaping Use    Vaping status: Never Used   Substance and Sexual Activity    Alcohol use: Not Currently     Comment: RARE    Drug use: Never    " Sexual activity: Defer     Social Determinants of Health     Financial Resource Strain: Patient Unable To Answer (9/30/2024)    Overall Financial Resource Strain (CARDIA)     Difficulty of Paying Living Expenses: Patient unable to answer   Food Insecurity: Patient Unable To Answer (9/30/2024)    Hunger Vital Sign     Worried About Running Out of Food in the Last Year: Patient unable to answer     Ran Out of Food in the Last Year: Patient unable to answer   Transportation Needs: Patient Unable To Answer (9/30/2024)    PRAPARE - Transportation     Lack of Transportation (Medical): Patient unable to answer     Lack of Transportation (Non-Medical): Patient unable to answer   Physical Activity: Inactive (7/15/2024)    Exercise Vital Sign     Days of Exercise per Week: 0 days     Minutes of Exercise per Session: 0 min   Stress: Patient Unable To Answer (7/15/2024)    [employer] - Occupational Stress Questionnaire     Feeling of Stress : Patient unable to answer   Social Connections: Patient Unable To Answer (7/15/2024)    Social Connection and Isolation Panel [NHANES]     Frequency of Communication with Friends and Family: Patient unable to answer     Frequency of Social Gatherings with Friends and Family: Patient unable to answer     Attends Zoroastrianism Services: Patient unable to answer     Active Member of Clubs or Organizations: Patient unable to answer     Attends Club or Organization Meetings: Patient unable to answer     Marital Status: Patient unable to answer   Intimate Partner Violence: Patient Unable To Answer (9/30/2024)    Humiliation, Afraid, Rape, and Kick questionnaire     Fear of Current or Ex-Partner: Patient unable to answer     Emotionally Abused: Patient unable to answer     Physically Abused: Patient unable to answer     Sexually Abused: Patient unable to answer   Housing Stability: Patient Unable To Answer (9/30/2024)    Housing Stability Vital Sign     Unable to Pay for  Housing in the Last Year: Patient unable to answer     Number of Times Moved in the Last Year: 1     Homeless in the Last Year: Patient unable to answer       SCREENINGS                        PHYSICAL EXAM    (up to 7 for level 4, 8 or more for level 5)     ED Triage Vitals [09/30/24 0911]   Temperature Heart Rate Respirations BP   36.5 °C (97.7 °F) 82 18 99/65      Pulse Ox Temp src Heart Rate Source Patient Position   96 % -- Monitor Sitting      BP Location FiO2 (%)     Right arm --       Physical Exam  Vitals and nursing note reviewed.   Constitutional:       Comments: Chronically ill-appearing, but no acute distress, nontoxic-appearing and not diaphoretic.   HENT:      Head: Normocephalic and atraumatic.      Mouth/Throat:      Mouth: Mucous membranes are moist.      Pharynx: Oropharynx is clear.   Eyes:      Pupils: Pupils are equal, round, and reactive to light.   Cardiovascular:      Rate and Rhythm: Normal rate. Rhythm irregular.      Heart sounds: Normal heart sounds.      Comments: 1+ radial pulses bilaterally  Pulmonary:      Effort: Pulmonary effort is normal.      Breath sounds: Normal breath sounds.   Abdominal:      General: There is no distension.      Palpations: Abdomen is soft. There is no mass.      Tenderness: There is no abdominal tenderness. There is no guarding.   Musculoskeletal:         General: Normal range of motion.      Cervical back: Normal range of motion and neck supple.   Skin:     General: Skin is warm and dry.      Capillary Refill: Capillary refill takes less than 2 seconds.      Coloration: Skin is not cyanotic or pale.      Findings: No erythema or rash.      Comments: Patient has what appears to be chronic skin changes consistent with chronic venous stasis dermatitis in the lower extremities.   Neurological:      Mental Status: She is alert.      Comments: Patient does not respond to questioning or follow commands which is patient's reported baseline.  No evidence of  hallucinations patient is not agitated.          DIAGNOSTIC RESULTS     LABS:  Labs Reviewed   CBC WITH AUTO DIFFERENTIAL - Abnormal       Result Value    WBC 5.8      nRBC 1.0 (*)     RBC 2.80 (*)     Hemoglobin 7.9 (*)     Hematocrit 26.8 (*)     MCV 96      MCH 28.2      MCHC 29.5 (*)     RDW 23.0 (*)     Platelets 64 (*)     Immature Granulocytes %, Automated 0.7      Immature Granulocytes Absolute, Automated 0.04      Narrative:     The previously reported component Neutrophils % is no longer being reported.  The previously reported component Lymphocytes % is no longer being reported.  The previously reported component Monocytes % is no longer being reported.  The previously   reported component Eosinophils % is no longer being reported.  The previously reported component Basophils % is no longer being reported.  The previously reported component Absolute Neutrophils is no longer being reported.  The previously reported   component Absolute Lymphocytes is no longer being reported.  The previously reported component Absolute Monocytes is no longer being reported.  The previously reported component Absolute Eosinophils is no longer being reported.  The previously reported   component Absolute Basophils is no longer being reported.   MAGNESIUM - Abnormal    Magnesium 1.58 (*)    COMPREHENSIVE METABOLIC PANEL - Abnormal    Glucose 43 (*)     Sodium 146 (*)     Potassium 3.6      Chloride 108 (*)     Bicarbonate 26      Anion Gap 16      Urea Nitrogen 34 (*)     Creatinine 1.87 (*)     eGFR 30 (*)     Calcium 8.9      Albumin 3.0 (*)     Alkaline Phosphatase 108      Total Protein 6.0 (*)     AST 13      Bilirubin, Total 0.6      ALT 8     B-TYPE NATRIURETIC PEPTIDE - Abnormal     (*)     Narrative:        <100 pg/mL - Heart failure unlikely  100-299 pg/mL - Intermediate probability of acute heart                  failure exacerbation. Correlate with clinical                  context and patient history.     >=300 pg/mL - Heart Failure likely. Correlate with clinical                  context and patient history.    BNP testing is performed using different testing methodology at [facility] than at other [facility] hospitals. Direct result comparisons should only be made within the same method.      URINALYSIS WITH REFLEX CULTURE AND MICROSCOPIC - Abnormal    Color, Urine Yellow      Appearance, Urine Turbid (*)     Specific Gravity, Urine 1.018      pH, Urine 5.5      Protein, Urine 100 (2+) (*)     Glucose, Urine Normal      Blood, Urine 0.1 (1+) (*)     Ketones, Urine NEGATIVE      Bilirubin, Urine NEGATIVE      Urobilinogen, Urine Normal      Nitrite, Urine 1+ (*)     Leukocyte Esterase, Urine 500 Margarita/µL (*)    SERIAL TROPONIN-INITIAL - Abnormal    Troponin I, High Sensitivity 42 (*)     Narrative:     Less than 99th percentile of normal range cutoff-  Female and children under 18 years old <14 ng/L; Male <21 ng/L: Negative  Repeat testing should be performed if clinically indicated.     Female and children under 18 years old 14-50 ng/L; Male 21-50 ng/L:  Consistent with possible cardiac damage and possible increased clinical   risk. Serial measurements may help to assess extent of myocardial damage.     >50 ng/L: Consistent with cardiac damage, increased clinical risk and  myocardial infarction. Serial measurements may help assess extent of   myocardial damage.      NOTE: Children less than 1 year old may have higher baseline troponin   levels and results should be interpreted in conjunction with the overall   clinical context.     NOTE: Troponin I testing is performed using a different   testing methodology at [facility] than at other   [facility]. Direct result comparisons should only   be made within the same method.   SERIAL TROPONIN, 1 HOUR - Abnormal    Troponin I, High Sensitivity 73 (*)     Narrative:     Less than 99th percentile of normal range cutoff-  Female and children  under 18 years old <14 ng/L; Male <21 ng/L: Negative  Repeat testing should be performed if clinically indicated.     Female and children under 18 years old 14-50 ng/L; Male 21-50 ng/L:  Consistent with possible cardiac damage and possible increased clinical   risk. Serial measurements may help to assess extent of myocardial damage.     >50 ng/L: Consistent with cardiac damage, increased clinical risk and  myocardial infarction. Serial measurements may help assess extent of   myocardial damage.      NOTE: Children less than 1 year old may have higher baseline troponin   levels and results should be interpreted in conjunction with the overall   clinical context.     NOTE: Troponin I testing is performed using a different   testing methodology at [facility] than at other   [facility]. Direct result comparisons should only   be made within the same method.   LACTATE - Abnormal    Lactate 3.2 (*)     Narrative:     Venipuncture immediately after or during the administration of Metamizole may lead to falsely low results. Testing should be performed immediately prior to Metamizole dosing.   LACTATE - Abnormal    Lactate 3.1 (*)     Narrative:     Venipuncture immediately after or during the administration of Metamizole may lead to falsely low results. Testing should be performed immediately prior to Metamizole dosing.   MICROSCOPIC ONLY, URINE - Abnormal    WBC, Urine >50 (*)     WBC Clumps, Urine MANY      RBC, Urine 3-5      Bacteria, Urine 1+ (*)     Mucus, Urine FEW      Hyaline Casts, Urine 2+ (*)    TROPONIN I, HIGH SENSITIVITY - Abnormal    Troponin I, High Sensitivity 81 (*)     Narrative:     Less than 99th percentile of normal range cutoff-  Female and children under 18 years old <14 ng/L; Male <21 ng/L: Negative  Repeat testing should be performed if clinically indicated.     Female and children under 18 years old 14-50 ng/L; Male 21-50 ng/L:  Consistent with possible cardiac damage and  possible increased clinical   risk. Serial measurements may help to assess extent of myocardial damage.     >50 ng/L: Consistent with cardiac damage, increased clinical risk and  myocardial infarction. Serial measurements may help assess extent of   myocardial damage.      NOTE: Children less than 1 year old may have higher baseline troponin   levels and results should be interpreted in conjunction with the overall   clinical context.     NOTE: Troponin I testing is performed using a different   testing methodology at [facility] than at other   [facility]. Direct result comparisons should only   be made within the same method.   TROPONIN I, HIGH SENSITIVITY - Abnormal    Troponin I, High Sensitivity 91 (*)     Narrative:     Less than 99th percentile of normal range cutoff-  Female and children under 18 years old <14 ng/L; Male <21 ng/L: Negative  Repeat testing should be performed if clinically indicated.     Female and children under 18 years old 14-50 ng/L; Male 21-50 ng/L:  Consistent with possible cardiac damage and possible increased clinical   risk. Serial measurements may help to assess extent of myocardial damage.     >50 ng/L: Consistent with cardiac damage, increased clinical risk and  myocardial infarction. Serial measurements may help assess extent of   myocardial damage.      NOTE: Children less than 1 year old may have higher baseline troponin   levels and results should be interpreted in conjunction with the overall   clinical context.     NOTE: Troponin I testing is performed using a different   testing methodology at [facility] than at other   [facility]. Direct result comparisons should only   be made within the same method.   POCT GLUCOSE - Abnormal    POCT Glucose 198 (*)    POCT GLUCOSE - Abnormal    POCT Glucose 206 (*)    MANUAL DIFFERENTIAL - Abnormal    Neutrophils %, Manual 58.0      Bands %, Manual 20.0      Lymphocytes %, Manual 17.0      Monocytes  %, Manual 4.0      Eosinophils %, Manual 1.0      Basophils %, Manual 0.0      Seg Neutrophils Absolute, Manual 3.36      Bands Absolute, Manual 1.16 (*)     Lymphocytes Absolute, Manual 0.99 (*)     Monocytes Absolute, Manual 0.23      Eosinophils Absolute, Manual 0.06      Basophils Absolute, Manual 0.00      Total Cells Counted 100      Neutrophils Absolute, Manual 4.52      RBC Morphology See Below      RBC Fragments Few      Ovalocytes Few      Teardrop Cells Few      Katarzyna Cells Few      Acanthocytes Few      Giant Platelets Few     BLOOD CULTURE - Normal    Blood Culture Loaded on Instrument - Culture in progress     BLOOD CULTURE - Normal    Blood Culture Loaded on Instrument - Culture in progress     PHOSPHORUS - Normal    Phosphorus 3.9     LIPASE - Normal    Lipase 17      Narrative:     Venipuncture immediately after or during the administration of Metamizole may lead to falsely low results. Testing should be performed immediately prior to Metamizole dosing.   INFLUENZA A AND B PCR - Normal    Flu A Result Not Detected      Flu B Result Not Detected      Narrative:     This assay is an in vitro diagnostic multiplex nucleic acid amplification test for the detection and discrimination of Influenza A & B from nasopharyngeal specimens, and has been validated for use at [facility]. Negative results do not preclude Influenza A/B infections, and should not be used as the sole basis for diagnosis, treatment, or other management decisions. If Influenza A/B and RSV PCR results are negative, testing for Parainfluenza virus, Adenovirus and Metapneumovirus is routinely performed for [facility] pediatric oncology and intensive care inpatients, and is available on other patients by placing an add-on request.   SARS-COV-2 PCR - Normal    Coronavirus 2019, PCR Not Detected      Narrative:     This assay has received FDA Emergency Use Authorization (EUA) and is only authorized for the duration of time that  circumstances exist to justify the authorization of the emergency use of in vitro diagnostic tests for the detection of SARS-CoV-2 virus and/or diagnosis of COVID-19 infection under section 564(b)(1) of the Act, 21 U.S.C. 360bbb-3(b)(1). This assay is an in vitro diagnostic nucleic acid amplification test for the qualitative detection of SARS-CoV-2 from nasopharyngeal specimens and has been validated for use at [facility]. Negative results do not preclude COVID-19 infections and should not be used as the sole basis for diagnosis, treatment, or other management decisions.     LACTATE - Normal    Lactate 2.0      Narrative:     Venipuncture immediately after or during the administration of Metamizole may lead to falsely low results. Testing should be performed immediately prior to Metamizole dosing.   URINE CULTURE   STAPHYLOCOCCUS AUREUS/MRSA COLONIZATION, CULTURE   LEGIONELLA ANTIGEN, URINE   STREPTOCOCCUS PNEUMONIAE ANTIGEN, URINE   TROPONIN SERIES- (INITIAL, 1 HR)    Narrative:     The following orders were created for panel order Troponin I Series, High Sensitivity (0, 1 HR).  Procedure                               Abnormality         Status                     ---------                               -----------         ------                     Troponin I, High Sensiti...[[id number]]  Abnormal            Final result               Troponin, High Sensitivi...[[id number]]  Abnormal            Final result                 Please view results for these tests on the individual orders.   URINALYSIS WITH REFLEX CULTURE AND MICROSCOPIC    Narrative:     The following orders were created for panel order Urinalysis with Reflex Culture and Microscopic.  Procedure                               Abnormality         Status                     ---------                               -----------         ------                     Urinalysis with Reflex C...[[id number]]  Abnormal            Final result                Extra Urine Gray Tube[[id number]]                            In process                   Please view results for these tests on the individual orders.   EXTRA URINE GRAY TUBE   PROCALCITONIN   RENAL FUNCTION PANEL   MAGNESIUM   CBC   POCT GLUCOSE METER   POCT GLUCOSE METER   POCT GLUCOSE METER       All other labs were within normal range or not returned as of this dictation.    Imaging  CT head wo IV contrast   Final Result   No acute finding.   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 Presumed chronic microvascular ischemic   changes deep cerebral white matter.        Signed by: Kenton Lomax 9/30/2024 10:37 AM   Dictation workstation:   [id number]      XR chest 1 view   Final Result   No acute process.   Signed by López Duke MD           Procedures  Procedures     EMERGENCY DEPARTMENT COURSE/MDM:     Diagnoses as of 09/30/24 1926   Sepsis, due to unspecified organism, unspecified whether acute organ dysfunction present (Multi)   Hypoglycemia        Medical Decision Making    63-year-old female with past medical history significant for lupus with lupus nephritis and CKD 3, adrenal insufficiency, HFpEF, recurrent urinary tract infections presenting to the Emergency Department for evaluation of altered mental status.  On arrival patient's blood pressures were soft with initial triage blood pressure of 99/65.  Vitals otherwise WNL and patient is nontoxic-appearing, no acute distress and afebrile.  Cardiac workup including BMP ordered as well as respiratory swabs, urinalysis, CT head without IV contrast ordered.    EKG: Atrial fibrillation with ventricular rate of 85 bpm,  ms, QTc 516.  No evidence of acute ST changes noted.    Labs significant for hyperglycemia with a glucose of 43.  25 g of D50 ordered and patient placed on D5 LR at 75 cc an hour.  Initial troponin elevated at 42.  Stable normocytic anemia with a hemoglobin of 7.9.  Mild  elevation in BNP at 217 which is downtrending when compared to patient's last BNP from 2 months ago.  Serum magnesium mildly decreased at 1.58.  Patient's blood pressures are soft patient's labs not consistent with adrenal insufficiency so we will hold off on Solu-Cortef at this time.  Urinalysis suggestive of urinary tract infection and patient became progressively more hypotensive concerning for developing septic shock from urosepsis.  1 L bolus of LR ordered and patient was started on peripheral Levophed and placed in Trendelenburg.  Repeat troponin up trended to 73 likely demand given patient's hypotension however 300 mg aspirin suppository ordered.  Repeat point-of-care glucose after D50 198.  Blood cultures and serum lactate ordered.  Tissue reperfusion exam performed at 1315 after completion of LR bolus initiating Levophed with capillary refill less than 3 seconds.    Attending physician discussed patient case with ICU attending Dr. Rojas who agreed to the patient to his service for further evaluation and treatment of urosepsis with septic shock with secondary diagnosis of hypoglycemia.    Patient and or family in agreement and understanding of treatment plan.  All questions answered.      I reviewed the case with the attending ED physician. The attending ED physician agrees with the plan. Patient and/or patient´s representative was counseled regarding labs, imaging, likely diagnosis, and plan. All questions were answered.    ED Medications administered this visit:    Medications   norepinephrine (Levophed) 8 mg in dextrose 5% 250 mL (0.032 mg/mL) infusion (premix) (0.04 mcg/kg/min × 85.3 kg intravenous Rate/Dose Verify 9/30/24 1900)   vasopressin (Vasostrict) 0.2 unit/mL in 5% dextrose 100 mL IV (0.03 Units/min intravenous Rate/Dose Verify 9/30/24 1900)   heparin (porcine) injection 5,000 Units (5,000 Units subcutaneous Given 9/30/24 1810)   pantoprazole (ProtoNix) EC tablet 40 mg (has no administration in  time range)     Or   esomeprazole (NexIUM) suspension 40 mg (has no administration in time range)     Or   pantoprazole (ProtoNix) injection 40 mg (has no administration in time range)   glucagon (Glucagen) injection 1 mg (has no administration in time range)   dextrose 50 % injection 25 g (has no administration in time range)   glucagon (Glucagen) injection 1 mg (has no administration in time range)   dextrose 50 % injection 12.5 g (has no administration in time range)   insulin lispro (HumaLOG) injection 0-5 Units (has no administration in time range)   hydrocortisone sodium succinate (PF) (Solu-CORTEF) injection 50 mg (50 mg intravenous Given 9/30/24 1810)   dextrose 5 % and lactated Ringer's infusion (75 mL/hr intravenous Rate/Dose Verify 9/30/24 1900)   cefTRIAXone (Rocephin) 1 g in dextrose (iso) IV 50 mL (0 g intravenous Stopped 9/30/24 1839)   flu vaccine trivalent (PF) (Fluarix/Fluzone/Flulaval) 6 months or greater injection (has no administration in time range)   dextrose 50 % injection 25 g (25 g intravenous Given 9/30/24 1105)   lactated Ringer's bolus 1,000 mL (0 mL intravenous Stopped 9/30/24 1243)   piperacillin-tazobactam (Zosyn) 4.5 g in dextrose (iso)  mL (0 g intravenous Stopped 9/30/24 1315)   hydrocortisone sodium succinate (PF) (Solu-CORTEF) injection 100 mg (100 mg intravenous Given 9/30/24 1231)   vancomycin (Vancocin) in  mL IV 1,250 mg (1,250 mg intravenous New Bag 9/30/24 1349)   aspirin suppository 300 mg (300 mg rectal Given 9/30/24 1334)       New Prescriptions from this visit:    Current Discharge Medication List          Follow-up:  No follow-up provider specified.      Final Impression:   1. Sepsis, due to unspecified organism, unspecified whether acute organ dysfunction present (Multi)    2. Hypoglycemia          (Please note that portions of this note were completed with a voice recognition program.  Efforts were made to edit the dictations but occasionally words are  mis-transcribed.)     Antelmo Maki, DO  Resident  09/30/24 1926

## 2024-09-30 NOTE — H&P
Critical Care Medicine History and Physical        Subjective   Patient is a 63 y.o. female admitted on 9/30/2024  9:01 AM  with chief complaint of AMS admitted to ICU for concern of septic shock and adrenal insufficiency requiring vasopressor therapy.     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 Per EMS, patient had been more confused from baseline (A&Ox3) and having hallucinations. Her blood sugar had been running borderline low over the past few days, but according to staff she was not complaining of any other symptoms like cough, chest pain, SOB, diarrhea, urinary symptoms, or fever.     In the ED, patient was hypotensive with pressures 80/41 and 47/39, but heart rate was normal without hypothermia or hyperthermia, maintained on RA with good saturations. Labs significant for glucose 43, sodium 146, creatinine 1.87, magnesium 1.58, , troponin 42, 73.  COVID flu were negative.  Urinalysis showed 500 leuk esterase with greater than 50 WBC and 1+ nitrate.  Blood cultures and urine cultures were taken.  Lactate was elevated at 3.2 and 3.1.  Patient was given Vanco and Zosyn as well as a 1 L LR bolus.  Patient had already received a normal saline bolus in EMS.  She was also given stress dose Solu-Cortef 100 mg as well as D50.  Aspirin suppository was provided given elevated troponin.  Patient did require Levophed and vasopressin due to low blood pressures.    Past Medical History:   Diagnosis Date    Abnormal kidney function 04/04/2023    Acute headache 03/10/2024    Acute iritis of right eye 07/24/2015    Acute low back pain 10/30/2023    Acute  upper respiratory infection, unspecified 03/04/2020    Acute URI    Acute upper respiratory infection, unspecified 09/30/2015    URTI (acute upper respiratory infection)    Arthralgia of right knee 02/14/2024    Arthritis     Atypical facial pain 03/10/2024    Body mass index (BMI) 23.0-23.9, adult 10/15/2021    BMI 23.0-23.9, adult    Body mass index (BMI) 33.0-33.9, adult 03/04/2020    BMI 33.0-33.9,adult    Cardiomegaly 08/27/2013    Left ventricular hypertrophy    Chest pain 06/10/2022    Comment on above: Added by Problem List Migration; 2013-3-12; Moved to Suppressed Nov 25 2013 9:16PM; Comment on above: Added by Problem List Migration; 2013-3-12; Moved to Suppressed Nov 25 2013 9:16PM;    Chronic kidney disease, stage 3 unspecified (Multi) 07/02/2013    Chronic kidney disease, stage III (moderate)    Confusional state 03/10/2024    Contact with and (suspected) exposure to covid-19 04/04/2023    Delirium 03/10/2024    Disease of pericardium, unspecified ([facility]) 07/02/2013    Pericardial disease    Encounter for follow-up examination after completed treatment for conditions other than malignant neoplasm 10/06/2022    Hospital discharge follow-up    Generalized contraction of visual field, right eye 01/29/2015    Generalized contraction of visual field of right eye    Hearing loss 03/10/2024    History of cataract 03/10/2024    History of thrombocytopenia 03/10/2024    Comment on above: Added by Problem List Migration; 2013-7-2;    Homonymous bilateral field defects, right side 04/29/2016    Homonymous bilateral field defects of right side    Hypertensive chronic kidney disease with stage 1 through stage 4 chronic kidney disease, or unspecified chronic kidney disease 07/02/2013    Nephrosclerosis    Laceration without foreign body, left foot, initial encounter 07/03/2018    Foot laceration, left, initial encounter    Localized edema 03/10/2024    Localized, primary osteoarthritis 02/14/2024    Mass of skin  03/10/2024    Migraine with aura, not intractable, without status migrainosus 10/24/2022    Ocular migraine    Open wound 03/10/2024    Open wound of left foot 03/10/2024    Other conditions influencing health status 07/02/2013    Chronic Glomerulonephritis In Diseases Classified Elsewhere    Other conditions influencing health status 07/02/2013    Progressive Familial Myoclonic Epilepsy    Other conditions influencing health status 07/02/2013    Protein S Deficiency    Other conditions influencing health status 05/22/2015    Familial Combined Hyperlipidemia    Other conditions influencing health status 10/24/2022    IDDM (insulin dependent diabetes mellitus)    Other conditions influencing health status 03/14/2022    Diabetes mellitus, insulin dependent (IDDM), uncontrolled    Other long term (current) drug therapy 10/24/2022    Long-term use of Plaquenil    Overweight with body mass index (BMI) 25.0-29.9 03/10/2024    Pain of toe 03/10/2024    Personal history of COVID-19 04/04/2023    Personal history of diseases of the blood and blood-forming organs and certain disorders involving the immune mechanism 07/02/2013    History of thrombocytopenia    Personal history of diseases of the skin and subcutaneous tissue 08/11/2015    History of foot ulcer    Personal history of nephrotic syndrome 07/02/2013    History of nephrotic syndrome    Personal history of other diseases of the circulatory system 08/27/2013    History of sinus tachycardia    Personal history of other diseases of the nervous system and sense organs     History of cataract    Personal history of other diseases of the respiratory system     History of bronchitis    Personal history of other infectious and parasitic diseases 07/02/2013    History of hepatitis    Personal history of other specified conditions     History of shortness of breath    Personal history of other specified conditions 08/27/2013    History of edema    Posterior epistaxis  03/10/2024    Puckering of macula, right eye 10/24/2022    ERM OD (epiretinal membrane, right eye)    Raynaud's syndrome without gangrene 07/02/2013    Raynaud's disease    Sinusitis 03/10/2024    Systemic lupus erythematosus, unspecified (Multi) 07/24/2015    SLE (systemic lupus erythematosus)    Systemic lupus erythematosus, unspecified (Multi) 07/24/2015    SLE (systemic lupus erythematosus)    Systemic lupus erythematosus, unspecified (Multi) 07/24/2015    Systemic lupus    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) 07/02/2013    Type 2 diabetes with nephropathy    Type 2 diabetes mellitus with mild nonproliferative diabetic retinopathy without macular edema, left eye (Multi) 07/27/2015    Non-proliferative diabetic retinopathy, left eye    Type 2 diabetes mellitus with mild nonproliferative diabetic retinopathy without macular edema, unspecified eye (Multi) 07/24/2015    Mild non proliferative diabetic retinopathy    Type 2 diabetes mellitus with proliferative diabetic retinopathy without macular edema, right eye (Multi) 07/27/2015    Proliferative diabetic retinopathy of right eye    Type 2 diabetes mellitus with proliferative diabetic retinopathy without macular edema, unspecified eye (Multi) 07/24/2015    Diabetic proliferative retinopathy    Unspecified acute and subacute iridocyclitis 07/24/2015    Acute iritis, right eye    Unspecified open wound, left foot, sequela 07/03/2018    Wound, open, foot, left, sequela     Past Surgical History:   Procedure Laterality Date    ANKLE SURGERY  01/29/2015    Ankle Surgery    CARDIAC ELECTROPHYSIOLOGY PROCEDURE Left 5/17/2024    Procedure: PPM IMPLANT DUAL;  Surgeon: Antonio Rodriges MD;  Location: [facility];  Service: Electrophysiology;  Laterality: Left;  Pt. needs platelets and Prednisone prior to procedure    CHOLECYSTECTOMY  01/29/2015    Cholecystectomy    CT GUIDED PERCUTANEOUS BIOPSY BONE DEEP  5/4/2021    CT GUIDED PERCUTANEOUS  BIOPSY BONE DEEP 5/4/2021 [facility]ICAL [facility]    EYE SURGERY  03/06/2015    Eye Surgery    FOOT SURGERY  01/29/2015    Foot Surgery    MR HEAD ANGIO WO IV CONTRAST  7/26/2013    MR HEAD ANGIO WO IV CONTRAST 7/26/2013 [facility] CLINICAL LEGACY    MR HEAD ANGIO WO IV CONTRAST  9/17/2021    MR HEAD ANGIO WO IV CONTRAST 9/17/2021 [facility]    MR HEAD ANGIO WO IV CONTRAST  3/25/2023    MR HEAD ANGIO WO IV CONTRAST [facility]    MR NECK ANGIO WO IV CONTRAST  7/26/2013    MR NECK ANGIO WO IV CONTRAST 7/26/2013 [facility] CL[facility]ICAL LEGAC[facility]    MR NECK ANGIO WO IV CONTRAST  9/17/2021    MR NECK ANGIO WO IV CONTRAST 9/17/2021 [facility]    MR NECK ANGIO WO IV CONTRAST  3/25/2023    MR NECK ANGIO WO IV CONTRAST [facility]    OTHER SURGICAL HISTORY  01/29/2015    Creation Of Pericardial Window    OTHER SURGICAL HISTORY  01/29/2015    Quadricepsplasty    TOTAL HIP ARTHROPLASTY  01/29/2015    Hip Replacement     Medications Prior to Admission   Medication Sig Dispense Refill Last Dose    acetaminophen (Tylenol) 325 mg tablet Take 2 tablets (650 mg) by mouth every 4 hours if needed for mild pain (1 - 3) or fever (temp greater than 38.0 C).   Unknown    atorvastatin (Lipitor) 40 mg tablet Take 1 tablet (40 mg) by mouth once daily at bedtime.   Unknown    blood-glucose sensor (Dexcom G6 Sensor) device Use to check sugars 3 times daily 4 each 2     budesonide (Pulmicort) 0.5 mg/2 mL nebulizer solution Take 2 mL (0.5 mg) by nebulization 2 times a day. Rinse mouth with water after use to reduce aftertaste and incidence of candidiasis. Do not swallow.   Unknown    Dexcom G4 platinum  (Dexcom G6 ) misc Use as instructed 1 each 0     Dexcom G4 platinum transmitter (Dexcom G6 Transmitter) device Use as instructed 1 each 0     furosemide (Lasix) 40 mg tablet Take 1 tablet (40 mg) by mouth once daily. 30 tablet 3 Unknown    heparin sodium,porcine (heparin, porcine,) 5,000 unit/mL injection Inject 1 mL (5,000 Units) under  "the skin every 8 hours. 90 mL 11 Unknown    hydrocortisone (Cortef) 10 mg tablet Take 1 tablet (10 mg) by mouth once daily in the evening. 30 tablet 3 Unknown    hydrocortisone (Cortef) 20 mg tablet Take 1 tablet (20 mg) by mouth once daily in the morning. 30 tablet 3 Unknown    insulin lispro (HumaLOG) 100 unit/mL injection Inject 0-10 Units under the skin 3 times a day as needed for high blood sugar (before meals). Take as directed per insulin Sliding Scale instructions.  0-150 = 0 units  151-200 = 2 units  201-250 = 4 units  251-300 = 6 units  301-350 = 8 units  351-400 = 10 units  >400 = give 10 units and contact MD   Unknown    insulin syringe,safetyneedle 29G X 1/2\" 0.5 mL syringe Use to inject 1-4 times daily. 100 each 11     ipratropium-albuteroL (Duo-Neb) 0.5-2.5 mg/3 mL nebulizer solution Take 3 mL by nebulization every 4 hours if needed for wheezing or shortness of breath. 180 mL 11 Unknown    levETIRAcetam (Keppra) 500 mg tablet Take 1 tablet (500 mg) by mouth 2 times a day.   Unknown    lidocaine 4 % patch Place 1 patch on the skin once daily. Remove & discard patch within 12 hours or as directed by MD.   Unknown    melatonin 5 mg tablet Take 1 tablet (5 mg) by mouth once daily at bedtime.   Unknown    metoprolol tartrate (Lopressor) 25 mg tablet Take 1 tablet (25 mg) by mouth 2 times a day. (Patient taking differently: Take 1 tablet (25 mg) by mouth 2 times a day. Hold for SBP<100 HR<60) 180 tablet 3 Unknown    mycophenolate (Cellcept) 250 mg capsule Take 4 capsules (1,000 mg) by mouth 2 times a day.   Unknown    paliperidone (Invega) 6 mg 24 hr tablet Take 1 tablet (6 mg) by mouth once daily in the morning. Do not crush, chew, or split.   Unknown    pantoprazole (ProtoNix) 40 mg EC tablet Take 1 tablet (40 mg) by mouth once daily in the morning. Take before meals. Do not crush, chew, or split.   Unknown    pen needle, diabetic 31 gauge x 5/16\" needle Use to inject 1-4 times daily as directed. 100 each " 11     polyethylene glycol (Glycolax, Miralax) 17 gram packet Take 17 g by mouth once daily.   Unknown    sertraline (Zoloft) 25 mg tablet Take 1 tablet (25 mg) by mouth once daily.   Unknown     Ace inhibitors, Hydroxychloroquine, Lisinopril, Sulfa (sulfonamide antibiotics), and Penicillins  Social History     Tobacco Use    Smoking status: Never     Passive exposure: Never    Smokeless tobacco: Never   Vaping Use    Vaping status: Never Used   Substance Use Topics    Alcohol use: Not Currently     Comment: RARE    Drug use: Never     Family History   Problem Relation Name Age of Onset    Other (RENAL DISEASE) Mother          END STAGE    Other (CARDIAC DISORDER) Mother      Cataracts Mother      Stroke Mother      Diabetes Mother      Kidney disease Mother      Lupus Mother      Other (CARDIAC DISORDER) Father      COPD Father      Glaucoma Father      Hypertension Father      Sleep apnea Father      Other (CARDIAC DISORDER) Sister      Depression Sister      Kidney disease Sister      Sickle cell trait Sister      Sleep apnea Daughter         Scheduled Medications:         Continuous Medications:   dextrose 5 % and lactated Ringer's, 75 mL/hr, Last Rate: 75 mL/hr (09/30/24 1106)  norepinephrine, 0-0.5 mcg/kg/min, Last Rate: 0.05 mcg/kg/min (09/30/24 1441)  vasopressin, 0.03 Units/min, Last Rate: 0.03 Units/min (09/30/24 1316)         PRN Medications:       Review of Systems:  Review of Systems  12 point review of systems done and negative except for stated above.    Objective   Vitals:  24hr Min/Max:  Temp  Min: 36.5 °C (97.7 °F)  Max: 36.5 °C (97.7 °F)  Pulse  Min: 66  Max: 94  BP  Min: 47/39  Max: 136/72  Resp  Min: 12  Max: 48  SpO2  Min: 82 %  Max: 100 %    Physical exam:    Physical Exam  Constitutional: A&Ox0, moaning with eyes closed.  Head and Face: Atraumatic, normocephalic   Eyes: Pupils dilate appropriately to light  ENT: Dry mucous membranes, trachea midline  Cardiovascular: irregularly irregular,  S1/S2, no murmurs, rubs, or gallops, radial pulses +2  Pulmonary: CTAB, no respiratory distress, no wheezing, rales or rhonchi, on RA  Abdomen: +BS, soft, non-tender, nondistended, no guarding rigidity or rebound tenderness, no masses noted  MSK: Nonpitting edema bilaterally, No joint swelling, normal movements of all extremities.   Neuro: Does not follow command, withdraws and localizes pain,   Skin- No lesions, contusions, or erythema.  Psychiatric: Judgment intact. Appropriate mood, affect and behavior    Assessment /Plan      Neuro: Acute Metabolic Encephalopathy 2/2 adrenal insuffiencey   -History: Conchita cerebritis  -Home meds: Zoloft, Keppra 500 mg twice daily, meclizine, paliperidone   -GCS: E 2 V 2 M 5  -Pain: None  -Sedation: None  -[facility] ICU  -CT head showed redemonstration of old infarct of left occipital lobe and small old left infarct of parietal lobe, however no acute findings.    Cardiac: Septic Shock with Adrenal insufficiency, Elevated troponin without ST depressions or elevations, likely demand ischemia  - History: HFmREF (EF 40-45% 5/2024), bradycardia 2/2 sinus node dysfunction s/p pacemaker (5/17/2024), CAD s/p CABG 2013, A-fib (previously on Eliquis but off due to thrombocytopenia)  - Goals:  [MAP> 65, HR ]  - Home meds: Atorvastatin, metoprolol  - Last echo: 40-45% 5/2024   - Pressors: Levophed, vasopressin  - Trend troponin, repeat ECG to look for evidence of myocardial ischemia    Pulmonary: No active concerns  -History: COPD  -Home meds: Albuterol      Continuous pulse oximetry   O2 PRN to maintain SpO2 > 94%, wean as tolerated  -Imaging: CXR negative    Gastrointestinal: No active concerns  -History: GERD  -Home meds: Pantoprazole  - Diet: N.p.o.  - Supplementation: None  - Prophylaxis: protonix  - Bowel regimen: MiraLAX  - Last BM: Last BM Date: 09/30/24  - Consider placing NG tube if patient mental status does not improve appropriately to initiate feeding    Renal: Lactic  acidosis  - Daily RFP,Mg,Phos  - History: CKD 3 (BL CR 1.5-1.9)  - Home meds: Lasix  - Montano for temperature management  Net IO Since Admission: 1,100 mL [24 1537]  Results from last 72 hours   Lab Units 24  0944   BUN mg/dL 34*   CREATININE mg/dL 1.87*         - Fluids: D5W @75ml/hr  - Electrolytes: Replete per protocol, goal K>4 Phos >3 Mg >2    Endocrine: Adrenal insufficiency likely secondary to infection  -History: recurrent adrenal insufficiency (hydrocortisone), 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   -Home meds:  Lantus 10 units in a.m., sliding scale, Cortef 20 mg in a.m./20 mg p.m., flu cortisone 0.1 mg every other day   -sliding scale: Holding in setting of hypoglycemia  Results from last 7 days   Lab Units 24  1145 24  0944   POCT GLUCOSE mg/dL 198*  --    GLUCOSE mg/dL  --  43*      -BG < 180 goal  -Hypoglycemia protocol  -Bear claire StackSolu-Cortef 50 every 6  -Hold mycophenolate    Hematology: No active concerns  - Daily CBC, coags  -History: Pancytopenia, thrombocytopenia   -Home meds: Heparin  Results from last 7 days   Lab Units 24  0944   HEMOGLOBIN g/dL 7.9*   HEMATOCRIT % 26.8*   PLATELETS AUTO x10*3/uL 64*     - DVT Prophylaxis: Heparin    Infectious Disease: Septic shock 2/2 UTI  -History: Multiple cellulitis/UTI  -Home meds: None  Results from last 7 days   Lab Units 24  0944   WBC AUTO x10*3/uL 5.8     Temp (24hrs), Av.5 °C (97.7 °F), Min:36.5 °C (97.7 °F), Max:36.5 °C (97.7 °F)     -Abx: Rocephin (-)  -Cultures:    Blood culture (): Pending   Urine culture (): Pending    LDA:   External Urinary Catheter Female (Active)   Placement Date/Time: 24 1126   Hand Hygiene Completed: Yes  External Catheter Type: Female   Number of days: 0         CODE STATUS: DNR and No Intubation    ABCDEF Checklist  Analgesia: Spontaneous awakening trial to be pursued if clinically appropriate. RASS goal reviewed  Breathing: Spontaneous breathing  trial to be pursued if clinically appropriate. Mechanical power of assisted ventilation reviewed  Choice of analgesia/sedation: Analgesic and sedative agents adjusted per clinical context.   Delirium assessed by CAM, will avoid exacerbating factors  Early mobility and exercise: Physical and occupational therapy engaged  Family: Plan of care, overall trajectory of patient shared with family. Questions elicited and answered as appropriate.       Due to the high probability of life threatening clinical decompensation, the patient required critical care time evaluating and managing this patient.  Critical care time included obtaining a history, examining the patient, ordering and reviewing studies, discussing, developing, and implementing a management plan, evaluating the patient's response to treatment, and discussion with other care team providers. I saw and evaluated the patient myself.  Critical care time was performed exclusive of billable procedures.    RESTRAINTS: Type -  - None      Critical care time: 90 mins      Case discussed with attending , Dr. Bob Silverio, DO  Internal Medicine, PGY-2

## 2024-10-01 ENCOUNTER — APPOINTMENT (OUTPATIENT)
Dept: RADIOLOGY | Facility: HOSPITAL | Age: 64
End: 2024-10-01
Payer: MEDICARE

## 2024-10-01 LAB
ALBUMIN SERPL BCP-MCNC: 3 G/DL (ref 3.4–5)
ANION GAP SERPL CALC-SCNC: 16 MMOL/L (ref 10–20)
BUN SERPL-MCNC: 33 MG/DL (ref 6–23)
CALCIUM SERPL-MCNC: 8.6 MG/DL (ref 8.6–10.3)
CARDIAC TROPONIN I PNL SERPL HS: 113 NG/L (ref 0–13)
CHLORIDE SERPL-SCNC: 107 MMOL/L (ref 98–107)
CO2 SERPL-SCNC: 23 MMOL/L (ref 21–32)
CREAT SERPL-MCNC: 1.97 MG/DL (ref 0.5–1.05)
EGFRCR SERPLBLD CKD-EPI 2021: 28 ML/MIN/1.73M*2
ERYTHROCYTE [DISTWIDTH] IN BLOOD BY AUTOMATED COUNT: 22.9 % (ref 11.5–14.5)
GLUCOSE BLD MANUAL STRIP-MCNC: 152 MG/DL (ref 74–99)
GLUCOSE BLD MANUAL STRIP-MCNC: 159 MG/DL (ref 74–99)
GLUCOSE BLD MANUAL STRIP-MCNC: 171 MG/DL (ref 74–99)
GLUCOSE BLD MANUAL STRIP-MCNC: 185 MG/DL (ref 74–99)
GLUCOSE SERPL-MCNC: 201 MG/DL (ref 74–99)
HCT VFR BLD AUTO: 25.3 % (ref 36–46)
HGB BLD-MCNC: 7.6 G/DL (ref 12–16)
HOLD SPECIMEN: NORMAL
LEGIONELLA AG UR QL: NEGATIVE
MAGNESIUM SERPL-MCNC: 1.45 MG/DL (ref 1.6–2.4)
MCH RBC QN AUTO: 28.7 PG (ref 26–34)
MCHC RBC AUTO-ENTMCNC: 30 G/DL (ref 32–36)
MCV RBC AUTO: 96 FL (ref 80–100)
NRBC BLD-RTO: 1.6 /100 WBCS (ref 0–0)
PHOSPHATE SERPL-MCNC: 3.6 MG/DL (ref 2.5–4.9)
PLATELET # BLD AUTO: 91 X10*3/UL (ref 150–450)
POTASSIUM SERPL-SCNC: 3.4 MMOL/L (ref 3.5–5.3)
PROCALCITONIN SERPL-MCNC: 0.41 NG/ML
RBC # BLD AUTO: 2.65 X10*6/UL (ref 4–5.2)
SODIUM SERPL-SCNC: 143 MMOL/L (ref 136–145)
VANCOMYCIN TROUGH SERPL-MCNC: 11.7 UG/ML (ref 5–20)
WBC # BLD AUTO: 11.8 X10*3/UL (ref 4.4–11.3)

## 2024-10-01 PROCEDURE — 2500000002 HC RX 250 W HCPCS SELF ADMINISTERED DRUGS (ALT 637 FOR MEDICARE OP, ALT 636 FOR OP/ED)

## 2024-10-01 PROCEDURE — 36415 COLL VENOUS BLD VENIPUNCTURE: CPT

## 2024-10-01 PROCEDURE — 2500000004 HC RX 250 GENERAL PHARMACY W/ HCPCS (ALT 636 FOR OP/ED): Performed by: INTERNAL MEDICINE

## 2024-10-01 PROCEDURE — 82947 ASSAY GLUCOSE BLOOD QUANT: CPT

## 2024-10-01 PROCEDURE — 2020000001 HC ICU ROOM DAILY

## 2024-10-01 PROCEDURE — 83735 ASSAY OF MAGNESIUM: CPT

## 2024-10-01 PROCEDURE — 37799 UNLISTED PX VASCULAR SURGERY: CPT

## 2024-10-01 PROCEDURE — 74018 RADEX ABDOMEN 1 VIEW: CPT | Performed by: RADIOLOGY

## 2024-10-01 PROCEDURE — 80202 ASSAY OF VANCOMYCIN: CPT

## 2024-10-01 PROCEDURE — 74018 RADEX ABDOMEN 1 VIEW: CPT

## 2024-10-01 PROCEDURE — 2500000001 HC RX 250 WO HCPCS SELF ADMINISTERED DRUGS (ALT 637 FOR MEDICARE OP): Performed by: NURSE PRACTITIONER

## 2024-10-01 PROCEDURE — 84100 ASSAY OF PHOSPHORUS: CPT

## 2024-10-01 PROCEDURE — 2500000004 HC RX 250 GENERAL PHARMACY W/ HCPCS (ALT 636 FOR OP/ED)

## 2024-10-01 PROCEDURE — 87040 BLOOD CULTURE FOR BACTERIA: CPT | Mod: STJLAB

## 2024-10-01 PROCEDURE — 84484 ASSAY OF TROPONIN QUANT: CPT

## 2024-10-01 PROCEDURE — 85027 COMPLETE CBC AUTOMATED: CPT

## 2024-10-01 PROCEDURE — 2500000004 HC RX 250 GENERAL PHARMACY W/ HCPCS (ALT 636 FOR OP/ED): Performed by: PHARMACIST

## 2024-10-01 RX ORDER — LEVETIRACETAM 500 MG/1
500 TABLET ORAL 2 TIMES DAILY
Status: DISCONTINUED | OUTPATIENT
Start: 2024-10-01 | End: 2024-10-02

## 2024-10-01 RX ORDER — VANCOMYCIN HYDROCHLORIDE 1 G/20ML
INJECTION, POWDER, LYOPHILIZED, FOR SOLUTION INTRAVENOUS DAILY PRN
Status: DISCONTINUED | OUTPATIENT
Start: 2024-10-01 | End: 2024-10-02

## 2024-10-01 RX ORDER — POTASSIUM CHLORIDE 14.9 MG/ML
20 INJECTION INTRAVENOUS
Status: COMPLETED | OUTPATIENT
Start: 2024-10-01 | End: 2024-10-01

## 2024-10-01 RX ORDER — CALCIUM GLUCONATE 20 MG/ML
2 INJECTION, SOLUTION INTRAVENOUS EVERY 6 HOURS PRN
Status: DISCONTINUED | OUTPATIENT
Start: 2024-10-01 | End: 2024-10-03

## 2024-10-01 RX ORDER — SERTRALINE HYDROCHLORIDE 25 MG/1
25 TABLET, FILM COATED ORAL DAILY
Status: DISCONTINUED | OUTPATIENT
Start: 2024-10-02 | End: 2024-10-02

## 2024-10-01 RX ORDER — BUDESONIDE 0.5 MG/2ML
0.5 INHALANT ORAL
Status: DISPENSED | OUTPATIENT
Start: 2024-10-01

## 2024-10-01 RX ORDER — MAGNESIUM SULFATE HEPTAHYDRATE 40 MG/ML
4 INJECTION, SOLUTION INTRAVENOUS EVERY 6 HOURS PRN
Status: DISCONTINUED | OUTPATIENT
Start: 2024-10-01 | End: 2024-10-03

## 2024-10-01 RX ORDER — ATORVASTATIN CALCIUM 40 MG/1
40 TABLET, FILM COATED ORAL NIGHTLY
Status: DISCONTINUED | OUTPATIENT
Start: 2024-10-01 | End: 2024-10-01

## 2024-10-01 RX ORDER — RISPERIDONE 0.5 MG/1
0.25 TABLET ORAL DAILY
Status: DISCONTINUED | OUTPATIENT
Start: 2024-10-01 | End: 2024-10-01

## 2024-10-01 RX ORDER — CEFTRIAXONE 2 G/50ML
2 INJECTION, SOLUTION INTRAVENOUS EVERY 24 HOURS
Status: DISCONTINUED | OUTPATIENT
Start: 2024-10-01 | End: 2024-10-03

## 2024-10-01 RX ORDER — CALCIUM GLUCONATE 20 MG/ML
1 INJECTION, SOLUTION INTRAVENOUS EVERY 6 HOURS PRN
Status: DISCONTINUED | OUTPATIENT
Start: 2024-10-01 | End: 2024-10-03

## 2024-10-01 RX ORDER — POTASSIUM CHLORIDE 1.5 G/1.58G
40 POWDER, FOR SOLUTION ORAL EVERY 6 HOURS PRN
Status: DISCONTINUED | OUTPATIENT
Start: 2024-10-01 | End: 2024-10-03

## 2024-10-01 RX ORDER — RISPERIDONE 0.5 MG/1
0.25 TABLET ORAL DAILY
Status: DISCONTINUED | OUTPATIENT
Start: 2024-10-02 | End: 2024-10-02

## 2024-10-01 RX ORDER — LEVETIRACETAM 500 MG/1
500 TABLET ORAL 2 TIMES DAILY
Status: DISCONTINUED | OUTPATIENT
Start: 2024-10-01 | End: 2024-10-01

## 2024-10-01 RX ORDER — POTASSIUM CHLORIDE 1.5 G/1.58G
20 POWDER, FOR SOLUTION ORAL EVERY 6 HOURS PRN
Status: DISCONTINUED | OUTPATIENT
Start: 2024-10-01 | End: 2024-10-03

## 2024-10-01 RX ORDER — POLYETHYLENE GLYCOL 3350 17 G/17G
17 POWDER, FOR SOLUTION ORAL DAILY
Status: DISPENSED | OUTPATIENT
Start: 2024-10-01

## 2024-10-01 RX ORDER — MAGNESIUM SULFATE HEPTAHYDRATE 40 MG/ML
4 INJECTION, SOLUTION INTRAVENOUS ONCE
Status: COMPLETED | OUTPATIENT
Start: 2024-10-01 | End: 2024-10-01

## 2024-10-01 RX ORDER — ATORVASTATIN CALCIUM 40 MG/1
40 TABLET, FILM COATED ORAL NIGHTLY
Status: DISPENSED | OUTPATIENT
Start: 2024-10-01

## 2024-10-01 RX ORDER — SERTRALINE HYDROCHLORIDE 25 MG/1
25 TABLET, FILM COATED ORAL DAILY
Status: DISCONTINUED | OUTPATIENT
Start: 2024-10-01 | End: 2024-10-01

## 2024-10-01 RX ORDER — POTASSIUM CHLORIDE 14.9 MG/ML
20 INJECTION INTRAVENOUS EVERY 6 HOURS PRN
Status: DISCONTINUED | OUTPATIENT
Start: 2024-10-01 | End: 2024-10-03

## 2024-10-01 RX ORDER — POTASSIUM CHLORIDE 20 MEQ/1
40 TABLET, EXTENDED RELEASE ORAL EVERY 6 HOURS PRN
Status: DISCONTINUED | OUTPATIENT
Start: 2024-10-01 | End: 2024-10-03

## 2024-10-01 RX ORDER — POTASSIUM CHLORIDE 20 MEQ/1
20 TABLET, EXTENDED RELEASE ORAL EVERY 6 HOURS PRN
Status: DISCONTINUED | OUTPATIENT
Start: 2024-10-01 | End: 2024-10-03

## 2024-10-01 RX ORDER — METOPROLOL TARTRATE 25 MG/1
25 TABLET, FILM COATED ORAL 2 TIMES DAILY
Status: DISCONTINUED | OUTPATIENT
Start: 2024-10-01 | End: 2024-10-04

## 2024-10-01 RX ORDER — VANCOMYCIN HYDROCHLORIDE 1 G/200ML
1000 INJECTION, SOLUTION INTRAVENOUS ONCE
Status: COMPLETED | OUTPATIENT
Start: 2024-10-01 | End: 2024-10-01

## 2024-10-01 RX ORDER — MAGNESIUM SULFATE HEPTAHYDRATE 40 MG/ML
2 INJECTION, SOLUTION INTRAVENOUS EVERY 6 HOURS PRN
Status: DISCONTINUED | OUTPATIENT
Start: 2024-10-01 | End: 2024-10-03

## 2024-10-01 RX ORDER — IPRATROPIUM BROMIDE AND ALBUTEROL SULFATE 2.5; .5 MG/3ML; MG/3ML
3 SOLUTION RESPIRATORY (INHALATION) EVERY 4 HOURS PRN
Status: ACTIVE | OUTPATIENT
Start: 2024-10-01

## 2024-10-01 RX ORDER — FUROSEMIDE 40 MG/1
40 TABLET ORAL DAILY
Status: DISPENSED | OUTPATIENT
Start: 2024-10-01

## 2024-10-01 RX ORDER — PANTOPRAZOLE SODIUM 40 MG/1
40 TABLET, DELAYED RELEASE ORAL
Status: DISCONTINUED | OUTPATIENT
Start: 2024-10-02 | End: 2024-10-01

## 2024-10-01 ASSESSMENT — PAIN - FUNCTIONAL ASSESSMENT
PAIN_FUNCTIONAL_ASSESSMENT: PAINAD (PAIN ASSESSMENT IN ADVANCED DEMENTIA SCALE)
PAIN_FUNCTIONAL_ASSESSMENT: CPOT (CRITICAL CARE PAIN OBSERVATION TOOL)

## 2024-10-01 ASSESSMENT — PAIN SCALES - PAIN ASSESSMENT IN ADVANCED DEMENTIA (PAINAD)
BREATHING: NORMAL
FACIALEXPRESSION: SMILING OR INEXPRESSIVE
BODYLANGUAGE: RELAXED
TOTALSCORE: 0
FACIALEXPRESSION: SMILING OR INEXPRESSIVE
BODYLANGUAGE: RELAXED
BREATHING: NORMAL
CONSOLABILITY: NO NEED TO CONSOLE
TOTALSCORE: 0
CONSOLABILITY: NO NEED TO CONSOLE
BODYLANGUAGE: RELAXED
TOTALSCORE: 0
BREATHING: NORMAL
FACIALEXPRESSION: SMILING OR INEXPRESSIVE
CONSOLABILITY: NO NEED TO CONSOLE

## 2024-10-01 NOTE — CONSULTS
Wound Care Consult     Visit Date: 10/1/2024      Patient Name: Bing Holliday         MRN: [id number]           YOB: 1960     Reason for Consult: Multiple wounds        Wound History: Present on admission     Pertinent Labs:   Albumin   Date Value Ref Range Status   10/01/2024 3.0 (L) 3.4 - 5.0 g/dL Final   04/29/2021 3.1 (L) 3.4 - 5.0 g/dL Final     Albumin, CSF   Date Value Ref Range Status   01/18/2024 25 0 - 35 mg/dL Final     Albumin Index   Date Value Ref Range Status   01/18/2024 10.7 (H) 0.0 - 9.0 ratio Final     Albumin, Serum   Date Value Ref Range Status   01/18/2024 2337 (L) 3500 - 5200 mg/dL Final       Wound Assessment:  Wound 07/30/24 Moisture Associated Skin Damage Back Lateral;Right (Active)   Wound Image   09/30/24 1602   Site Assessment Red;Yellow 10/01/24 1600   Wound Length (cm) 2.5 cm 09/30/24 1602   Wound Width (cm) 10 cm 09/30/24 1602   Wound Surface Area (cm^2) 25 cm^2 09/30/24 1602   Wound Depth (cm) 0.1 cm 09/30/24 1602   Wound Volume (cm^3) 2.5 cm^3 09/30/24 1602   Wound Healing % -67254 09/30/24 1602   Drainage Description None 09/30/24 1600   Drainage Amount None 09/30/24 1600   Dressing Open to air 10/01/24 1600       Wound 08/01/24 Pressure Injury Sacrum (Active)   Wound Image   09/30/24 1603   Site Assessment [place] 10/01/24 1600   [place]-Wound Assessment [place] 10/01/24 1600   Pressure Injury Stage 2 09/30/24 1600   Wound Length (cm) 6 cm 09/30/24 1603   Wound Width (cm) 5.5 cm 09/30/24 1603   Wound Surface Area (cm^2) 33 cm^2 09/30/24 1603   Wound Depth (cm) 0.1 cm 09/30/24 1603   Wound Volume (cm^3) 3.3 cm^3 09/30/24 1603   Drainage Description None 10/01/24 1600   Drainage Amount None 10/01/24 1600   Dressing Foam 10/01/24 1600   Dressing Changed Changed 09/30/24 2000   Dressing Status Dry;Clean 10/01/24 1600       Wound 09/30/24 Pressure Injury Elbow Dorsal;Left (Active)   Wound Image   09/30/24 1606   Site Assessment Red;Yellow 10/01/24 1600   [place]-Wound Assessment  [place] 10/01/24 1600   Pressure Injury Stage 2 09/30/24 1600   Wound Length (cm) 1 cm 09/30/24 1606   Wound Width (cm) 1 cm 09/30/24 1606   Wound Surface Area (cm^2) 1 cm^2 09/30/24 1606   Wound Depth (cm) 0.1 cm 09/30/24 1606   Wound Volume (cm^3) 0.1 cm^3 09/30/24 1606   Drainage Description None 10/01/24 1600   Drainage Amount None 10/01/24 1600   Dressing Foam 10/01/24 1600   Dressing Changed Reinforced 09/30/24 2000   Dressing Status Clean;Dry 10/01/24 1600       Wound 09/30/24 Leg Dorsal;Proximal;Right;Upper (Active)   Wound Image   09/30/24 2000   Margins Poorly defined 10/01/24 1600   Drainage Description None 10/01/24 1600   Dressing Foam 10/01/24 1600   Dressing Changed New 09/30/24 2000   Dressing Status Clean;Dry 10/01/24 1600       Wound Team Summary Assessment: Did not see pt, saw pictures and spoke to nurse. Left elbow per nurse dry and RISSA, Mepilex on the Stage 2 PI sacrum and MASD Back right thigh.     Wound Team Plan: Continue with above treatments. Will continue to follow as necessary.     Aretha Wise RN  10/1/2024  7:14 PM

## 2024-10-01 NOTE — ED PROCEDURE NOTE
Procedure  Critical Care    Performed by: Angus Henry MD  Authorized by: Angus Henry MD    Critical care provider statement:     Critical care time (minutes):  35    Critical care time was exclusive of:  Separately billable procedures and treating other patients and teaching time    Critical care was necessary to treat or prevent imminent or life-threatening deterioration of the following conditions:  Circulatory failure, shock, sepsis and dehydration    Critical care was time spent personally by me on the following activities:  Blood draw for specimens, development of treatment plan with patient or surrogate, evaluation of patient's response to treatment, examination of patient, obtaining history from patient or surrogate, ordering and performing treatments and interventions, ordering and review of laboratory studies, ordering and review of radiographic studies, pulse oximetry, re-evaluation of patient's condition, vascular access procedures and review of old charts    Care discussed with: admitting provider                 Angus Henry MD  10/01/24 1616      As an addendum placed 10/9 at 12:03 AM, a note is made that patient got 1 L of fluid in the field in addition to the 1 L given here.  Further fluid withheld secondary to concerns of fluid overload given her history of CHF.     Angus Henry MD  10/09/24 0003

## 2024-10-01 NOTE — CARE PLAN
Problem: Pain - Adult  Goal: Verbalizes/displays adequate comfort level or baseline comfort level  Outcome: Progressing     Problem: Safety - Adult  Goal: Free from fall injury  Outcome: Progressing     Problem: Discharge Planning  Goal: Discharge to home or other facility with appropriate resources  Outcome: Progressing     Problem: Chronic Conditions and Co-morbidities  Goal: Patient's chronic conditions and co-morbidity symptoms are monitored and maintained or improved  Outcome: Progressing     Problem: Fall/Injury  Goal: Verbalize understanding of personal risk factors for fall in the hospital  Outcome: Progressing  Goal: Verbalize understanding of risk factor reduction measures to prevent injury from fall in the home  Outcome: Progressing     Problem: Skin  Goal: Decreased wound size/increased tissue granulation at next dressing change  Outcome: Progressing  Flowsheets (Taken 10/1/2024 0654)  Decreased wound size/increased tissue granulation at next dressing change:   Promote sleep for wound healing   Utilize specialty bed per algorithm   Protective dressings over bony prominences  Goal: Participates in plan/prevention/treatment measures  Outcome: Progressing  Flowsheets (Taken 10/1/2024 0654)  Participates in plan/prevention/treatment measures:   Discuss with provider PT/OT consult   Increase activity/out of bed for meals   Elevate heels  Goal: Prevent/manage excess moisture  Outcome: Progressing  Flowsheets (Taken 10/1/2024 0654)  Prevent/manage excess moisture:   Moisturize dry skin   Use wicking fabric (obtain order)   Cleanse incontinence/protect with barrier cream  Goal: Prevent/minimize sheer/friction injuries  Outcome: Progressing  Flowsheets (Taken 10/1/2024 0654)  Prevent/minimize sheer/friction injuries:   HOB 30 degrees or less   Increase activity/out of bed for meals   Turn/reposition every 2 hours/use positioning/transfer devices   Use pull sheet  Goal: Promote skin healing  Outcome:  Progressing  Flowsheets (Taken 10/1/2024 4181)  Promote skin healing:   Assess skin/pad under line(s)/device(s)   Protective dressings over bony prominences   Turn/reposition every 2 hours/use positioning/transfer devices     Problem: Infection prevention/bleeding  Goal: No further progression of infection  Outcome: Progressing     Problem: Perfusion/Cardiac  Goal: Adequate perfusion to organs/extremities  Outcome: Progressing  Goal: Hemodynamically stable  Outcome: Progressing  Goal: No cardiac arrhythmias  Outcome: Progressing     Problem: Indwelling Catheter Maintenance  Goal: I will have no complications from indwelling catheter  Outcome: Progressing     Problem: Nutrition  Goal: Less than 5 days NPO/clear liquids  Outcome: Progressing  Goal: Nutrition support is meeting 75% of nutrient needs  Outcome: Progressing  Goal: BG  mg/dL  Outcome: Progressing  Goal: Lab values WNL  Outcome: Progressing  Goal: Electrolytes WNL  Outcome: Progressing  Goal: Promote healing  Outcome: Progressing  Goal: Maintain stable weight  Outcome: Progressing

## 2024-10-01 NOTE — PROGRESS NOTES
Nutrition Initial Assessment:   Nutrition Assessment    Reason for Assessment: Admission nursing screening    Nutrition Note:  Bing Holliday is a 63 y.o. female presenting 9/30 from [facility] with hypoglycemia, hallucinations/confusion. Current work up revealing UTI, + blood cultures, septic shock and adrenal insufficiency crisis, and hypoglycemia.  Pt presently on levophed with vasopressin on hold; ID involved in pt care.       Past Medical History: SLE, lupus cerebritis, RA, Raynaud's syndrome, hyperparathyroidism, adrenal insufficiency, COPD, DM, HTN, HLD, atrial fibrillation (not on anticoagulation), CKD, pacemaker, seizures, psychosis   Surgical History   has a past surgical history that includes Eye surgery (03/06/2015); Total hip arthroplasty (01/29/2015); Cholecystectomy (01/29/2015); Other surgical history (01/29/2015); Other surgical history (01/29/2015); Ankle surgery (01/29/2015); Foot surgery (01/29/2015); MR angio head wo IV contrast (7/26/2013); MR angio neck wo IV contrast (7/26/2013); CT guided percutaneous biopsy bone deep (5/4/2021); MR angio head wo IV contrast (9/17/2021); MR angio neck wo IV contrast (9/17/2021); MR angio neck wo IV contrast (3/25/2023); MR angio head wo IV contrast (3/25/2023); and Cardiac electrophysiology procedure (Left, 5/17/2024).       Nutrition History:  Food and Nutrient History: Pt yelling out with hands on care otherwise does not follow directions or speak. Pt from [facility]--there on low concentrated sweets/mechanical soft with thin liquids. Spoke with [facility] [place] at facility ([phone])--states pt fed self and ate 50-75% of meals. Pt had been on Boost Glucose Control however the supplement was discontinued in 7/2024.  Vitamin/Herbal Supplement Use: home meds include melatonin, SSI, cortef, cellcept  Food Allergies/Intolerances:  None  GI Symptoms: None  Oral Problems: Chewing difficulty, Swallowing difficulty, and on mechanical soft diet PTA    "    Anthropometrics:  Height: 180.3 cm (5' 11\")   Weight: 85.8 kg (189 lb 2.5 oz)   BMI (Calculated): 26.39   Admit weight 84.7kg   IBW: 70.5kg       Weight History:   7/2024: 93.7kg 9.6% x3 months  6/2024: 95.7kg  4/2024: 90.9kg 6.8% x 6 months  10/2023: 94.6kg  Weight Change %: unintentional 9.6% x3 months. Pt at [facility] 7/15-7/30 with adrenal crisis with PPN 7/22-7/25 due to failed attempt at dobhoff placement (epitaxis). Pt was transferred to [facility] 7/30-8/2/2024 for rheumatologic evaluation with pt then more alert.      PAINAD Score:  [0-1]      Nutrition Focused Physical Exam Findings:  defer: limited as pt yells out with hands on care.   Subcutaneous Fat Loss:   Orbital Fat Pads: Mild-Moderate (slight dark circles and slight hollowing)  Buccal Fat Pads: Mild-Moderate (flat cheeks, minimal bounce)  Triceps: Defer  Ribs: Defer  Muscle Wasting:  Temporalis: Mild-Moderate (slight depression)  Pectoralis (Clavicular Region): Mild-Moderate (some protrusion of clavicle)  Deltoid/Trapezius: Defer  Interosseous: Defer  Trapezius/Infraspinatus/Supraspinatus (Scapular Region): Defer  Quadriceps: Defer  Gastrocnemius: Defer  Edema:  Edema: none  Physical Findings:  Skin: Positive (left elbow stage II, sacrum stage II, MASD, right leg wound)    Nutrition Significant Labs:  BMP Trend:   Results from last 7 days   Lab Units 10/01/24  0339 09/30/24  0944   GLUCOSE mg/dL 201* 43*   CALCIUM mg/dL 8.6 8.9   SODIUM mmol/L 143 146*   POTASSIUM mmol/L 3.4* 3.6   CO2 mmol/L 23 26   CHLORIDE mmol/L 107 108*   BUN mg/dL 33* 34*   CREATININE mg/dL 1.97* 1.87*    , A1C:  Lab Results   Component Value Date    HGBA1C 6.8 (H) 06/04/2024   , BG POCT trend:   Results from last 7 days   Lab Units 10/01/24  1247 10/01/24  0904 10/01/24  0350 09/30/24  2356 09/30/24  2113   POCT GLUCOSE mg/dL 159* 171* 185* 241* 301*    , Liver Function Trend:   Results from last 7 days   Lab Units 09/30/24  0944   ALK PHOS U/L 108   AST U/L 13   ALT U/L 8 "   BILIRUBIN TOTAL mg/dL 0.6    , Vit B12:   Lab Results   Component Value Date    LZJGTRXM40 1,499 (H) 03/20/2024    , Folate:   Lab Results   Component Value Date    FOLATE >24.0 04/25/2024    , CF Vitamin Labs:   Lab Results   Component Value Date    VITD25 34 12/10/2022        Nutrition Specific Medications:  Scheduled medications  [Held by provider] atorvastatin, 40 mg, oral, Nightly  [Held by provider] budesonide, 0.5 mg, nebulization, BID  cefTRIAXone, 2 g, intravenous, q24h  influenza, 0.5 mL, intramuscular, During hospitalization  [Held by provider] furosemide, 40 mg, oral, Daily  heparin (porcine), 5,000 Units, subcutaneous, q8h  hydrocortisone sodium succinate, 50 mg, intravenous, q6h  [Held by provider] levETIRAcetam, 500 mg, oral, BID  [Held by provider] metoprolol tartrate, 25 mg, oral, BID  pantoprazole, 40 mg, intravenous, Daily before breakfast  [Held by provider] polyethylene glycol, 17 g, oral, Daily  [Held by provider] risperiDONE, 0.25 mg, oral, Daily  [Held by provider] sertraline, 25 mg, oral, Daily  vancomycin, 1,000 mg, intravenous, Once      Continuous medications  norepinephrine, 0-0.5 mcg/kg/min, Last Rate: 0.01 mcg/kg/min (10/01/24 1245)  vasopressin, 0.03 Units/min, Last Rate: Stopped (10/01/24 0435)      PRN medications  PRN medications: calcium gluconate, calcium gluconate, dextrose, dextrose, glucagon, glucagon, [Held by provider] ipratropium-albuteroL, magnesium sulfate, magnesium sulfate, potassium chloride CR **OR** potassium chloride, potassium chloride CR **OR** potassium chloride, potassium chloride, vancomycin     I/O:   Last BM Date: 09/30/24; Stool Appearance: Loose (10/01/24 0800)    Dietary Orders (From admission, onward)       Start     Ordered    09/30/24 1901  May Participate in Room Service With Assistance  Once        Question:  .  Answer:  Yes    09/30/24 1900 09/30/24 1800  NPO Diet; Effective now  Diet effective now         09/30/24 7880                      Estimated Needs:   Total Energy Estimated Needs (kCal):  (6635-7617 (24-26kcal/kg of 84.7kg))     Total Protein Estimated Needs (g):  (92-113g (1.3-1.6g/kg of 70.5kg))     Total Fluid Estimated Needs (mL):  (1mL/kcal/d or as per physician)           Nutrition Diagnosis   Malnutrition Diagnosis  Patient has Malnutrition Diagnosis: Yes  Diagnosis Status: New  Malnutrition Diagnosis: Moderate malnutrition related to chronic disease or condition  As Evidenced by: recurrent adrenal crisis with resulting inconsistent oral intakes due to altered mentation, 9.6% weight loss x3 months with moderate fat and muscle mass wasting.            Nutrition Interventions/Recommendations         Nutrition Prescription:  Individualized Nutrition Prescription Provided for : PO versus enteral nutrition        Nutrition Interventions:   Interventions: Meals and snacks, Enteral intake, Medical food supplement  Meals and Snacks: Texture-modified diet  Goal: Advance diet as appropriate to liberalized regular/minced moist level 5 (equivalent to [facility] soft) with thin liquids. (consider ST eval prior to po attempts if any overt swallowing difficulties noted.)  Enteral Intake: Insert enteral feeding tube  Goal: If unable to eat, consider attempt enteral access and start tube feeds with Glucerna goal of 55mL/hr to provide 1980kcal, 109g pro, and 1002mL formula free water or 23kcal/kg and 1.5g pro/kg IBW.   Adjust water flush pending fluid needs--suggesting start with 150mL 6x/day for 1902mL water (formula + flush).  Medical Food Supplement: Commercial beverage  Goal: consider supplement with Glucerna 3x/day for additional 660kcal and 30g pro/day    Collaboration and Referral of Nutrition Care: Collaboration by nutrition professional with other nutrition professionals, Team meeting involving nutrition professional  Goal: [facility] and [facility] team during acute care rounds.    Nutrition Education:   10/1: pt not appropriate for education at present  time.        Nutrition Monitoring and Evaluation   Food/Nutrient Related History Monitoring  Monitoring and Evaluation Plan: Energy intake  Energy Intake: Estimated energy intake  Criteria: >75% of estimated nutrient needs via PO versus EN within next 24-48 hours.    Body Composition/Growth/Weight History  Monitoring and Evaluation Plan: Weight  Weight: Measured weight  Criteria: daily weight    Biochemical Data, Medical Tests and Procedures  Monitoring and Evaluation Plan: Electrolyte/renal panel, Glucose/endocrine profile  Electrolyte and Renal Panel: Magnesium, Phosphorus, Potassium, Sodium  Criteria: lytes WNR  Glucose/Endocrine Profile: Glucose, casual, Glucose, fasting  Criteria: BG 70-180mg/dL; prevent hypoglycemia.    Nutrition Focused Physical Findings  Monitoring and Evaluation Plan: Skin  Skin: Impaired wound healing  Criteria: promote skin integrity         Time Spent (min): 60 minutes

## 2024-10-01 NOTE — CONSULTS
Vancomycin Dosing by Pharmacy- INITIAL    Bing Holliday is a 63 y.o. year old female who Pharmacy has been consulted for vancomycin dosing for other Sepsis/complicated UTI . Based on the patient's indication and renal status this patient will be dosed based on a goal trough/random level of 15-20.     Renal function is currently stable.    Visit Vitals  BP 95/58   Pulse 102   Temp 36.2 °C (97.2 °F)   Resp (!) 31        Lab Results   Component Value Date    CREATININE 1.97 (H) 10/01/2024    CREATININE 1.87 (H) 2024    CREATININE 1.63 (H) 2024    CREATININE 1.41 (H) 2024        Patient weight is as follows:   Vitals:    10/01/24 0600   Weight: 85.8 kg (189 lb 2.5 oz)       Cultures:  No results found for the encounter in last 14 days.        I/O last 3 completed shifts:  In: 2406.5 (28 mL/kg) [I.V.:1156.5 (13.5 mL/kg); IV Piggyback:1250]  Out: 310 (3.6 mL/kg) [Urine:310 (0.1 mL/kg/hr)]  Weight: 85.8 kg   I/O during current shift:  I/O this shift:  In: -   Out: 55 [Urine:55]    Temp (24hrs), Av.2 °C (97.1 °F), Min:32.8 °C (91.1 °F), Max:37.8 °C (100 °F)         Assessment/Plan     Patient has already been given a loading dose of 1250 mg.  Will dose by level.  Follow-up level will be ordered on 10/1 at 1400 unless clinically indicated sooner.1400  Will continue to monitor renal function daily while on vancomycin and order serum creatinine at least every 48 hours if not already ordered.  Follow for continued vancomycin needs, clinical response, and signs/symptoms of toxicity.       Taryn Resendiz, PharmD

## 2024-10-01 NOTE — PROGRESS NOTES
Vancomycin Dosing by Pharmacy- JED/CRRT/PD Follow Up    Bing Holliday is a 63 y.o. year old female who Pharmacy has been consulted for vancomycin dosing for Vancomycin Indications: Other: bacteremia . Based on the patient's indication and renal status this patient will be dosed based on a target level of Other Indication: 15-20 mcg/mL    Patient is currently in JED (Baseline Scr 1)    Last vancomycin dose (incl. date and time): 1250 mg on 9/30 at 1349    Vancomycin level (incl. date and time): 11.7 mcg/mL on 10/1 at 1346    Lab Results   Component Value Date    VANCORANDOM 21.7 (H) 05/12/2024    VANCOTROUGH 11.7 10/01/2024       Visit Vitals  BP 95/58   Pulse 90   Temp 36.1 °C (97 °F) (Temporal)   Resp (!) 28           Lab Results   Component Value Date    CREATININE 1.97 (H) 10/01/2024    CREATININE 1.87 (H) 09/30/2024    CREATININE 1.63 (H) 08/01/2024    CREATININE 1.41 (H) 07/31/2024       I/O last 3 completed shifts:  In: 2406.5 (28 mL/kg) [I.V.:1156.5 (13.5 mL/kg); IV Piggyback:1250]  Out: 310 (3.6 mL/kg) [Urine:310 (0.1 mL/kg/hr)]  Weight: 85.8 kg     Assessment/Plan     Level is below target trough goal.   Vancomycin dose of 1000 mg ordered.  Next vancomycin level will be obtained on 10/2 at 1400. May be obtained sooner if clinically indicated.   Will continue to monitor renal function daily while on vancomycin and order serum creatinine at least every 48 hours if not already ordered.  Follow for continued vancomycin needs, clinical response, and signs/symptoms of toxicity.     Kiara Larsen, PharmD, BCPS, BCIDP  Clinical Pharmacy Specialist, Infectious Diseases  [phone]

## 2024-10-01 NOTE — CONSULTS
Infectious Disease Consult    PATIENT NAME: Bing Holliday    MRN: [id number]  SERVICE DATE:  10/1/2024   SERVICE TIME:  3:39 PM    PRIMARY CARE PHYSICIAN: Claudio Hood DO  REASON FOR CONSULT: Gram positive bacteremia  REQUESTING PHYSICIAN: Dr. 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 The patient was more confused at her skilled nursing facility prompting her to be sent in for evaluation. On initial evaluation, the patient was HDS on RA however she developed profound hypotension with BP 47/39 and was started on pressors. Additionally, she developed hypothermia. Labs demonstrated anemia and thrombocytopenia but no leukocytosis. Lactate elevated but trended down to normal range. Troponins elevated and slowly uptrending. UA showing leukocyte esterase, nitrites, >50 WBCs, and 1+ bacteria. Flu and covid PCR negative. CXR negative for acute process. CT head negative for acute findings. The patient was started on Vancomycin and Zosyn and bolused 1 L LR before being admitted to the ICU. In the ICU vasopressin was started then weaned off, and solucortef was started given her history of adrenal insufficieny. Antibiotics were transitioned from Zosyn to Ceftriaxone. Blood cultures drawn in the ED grew gram positive cocci and ID was consulted.    Patient seen and evaluated at bedside. She is lethargic, but briefly wakes to verbal stimuli. Further review of system unable to be obtained due to patient mentation.    PAST MEDICAL HISTORY:   Past Medical History:   Diagnosis Date    Abnormal kidney function 04/04/2023    Acute headache 03/10/2024    Acute iritis of right eye 07/24/2015     Acute low back pain 10/30/2023    Acute upper respiratory infection, unspecified 03/04/2020    Acute URI    Acute upper respiratory infection, unspecified 09/30/2015    URTI (acute upper respiratory infection)    Arthralgia of right knee 02/14/2024    Arthritis     Atypical facial pain 03/10/2024    Body mass index (BMI) 23.0-23.9, adult 10/15/2021    BMI 23.0-23.9, adult    Body mass index (BMI) 33.0-33.9, adult 03/04/2020    BMI 33.0-33.9,adult    Cardiomegaly 08/27/2013    Left ventricular hypertrophy    Chest pain 06/10/2022    Comment on above: Added by Problem List Migration; 2013-3-12; Moved to Suppressed Nov 25 2013 9:16PM; Comment on above: Added by Problem List Migration; 2013-3-12; Moved to Suppressed Nov 25 2013 9:16PM;    Chronic kidney disease, stage 3 unspecified (Multi) 07/02/2013    Chronic kidney disease, stage III (moderate)    Confusional state 03/10/2024    Contact with and (suspected) exposure to covid-19 04/04/2023    Delirium 03/10/2024    Disease of pericardium, unspecified ([facility]) 07/02/2013    Pericardial disease    Encounter for follow-up examination after completed treatment for conditions other than malignant neoplasm 10/06/2022    Hospital discharge follow-up    Generalized contraction of visual field, right eye 01/29/2015    Generalized contraction of visual field of right eye    Hearing loss 03/10/2024    History of cataract 03/10/2024    History of thrombocytopenia 03/10/2024    Comment on above: Added by Problem List Migration; 2013-7-2;    Homonymous bilateral field defects, right side 04/29/2016    Homonymous bilateral field defects of right side    Hypertensive chronic kidney disease with stage 1 through stage 4 chronic kidney disease, or unspecified chronic kidney disease 07/02/2013    Nephrosclerosis    Laceration without foreign body, left foot, initial encounter 07/03/2018    Foot laceration, left, initial encounter    Localized edema 03/10/2024    Localized, primary  osteoarthritis 02/14/2024    Mass of skin 03/10/2024    Migraine with aura, not intractable, without status migrainosus 10/24/2022    Ocular migraine    Open wound 03/10/2024    Open wound of left foot 03/10/2024    Other conditions influencing health status 07/02/2013    Chronic Glomerulonephritis In Diseases Classified Elsewhere    Other conditions influencing health status 07/02/2013    Progressive Familial Myoclonic Epilepsy    Other conditions influencing health status 07/02/2013    Protein S Deficiency    Other conditions influencing health status 05/22/2015    Familial Combined Hyperlipidemia    Other conditions influencing health status 10/24/2022    IDDM (insulin dependent diabetes mellitus)    Other conditions influencing health status 03/14/2022    Diabetes mellitus, insulin dependent (IDDM), uncontrolled    Other long term (current) drug therapy 10/24/2022    Long-term use of Plaquenil    Overweight with body mass index (BMI) 25.0-29.9 03/10/2024    Pain of toe 03/10/2024    Personal history of COVID-19 04/04/2023    Personal history of diseases of the blood and blood-forming organs and certain disorders involving the immune mechanism 07/02/2013    History of thrombocytopenia    Personal history of diseases of the skin and subcutaneous tissue 08/11/2015    History of foot ulcer    Personal history of nephrotic syndrome 07/02/2013    History of nephrotic syndrome    Personal history of other diseases of the circulatory system 08/27/2013    History of sinus tachycardia    Personal history of other diseases of the nervous system and sense organs     History of cataract    Personal history of other diseases of the respiratory system     History of bronchitis    Personal history of other infectious and parasitic diseases 07/02/2013    History of hepatitis    Personal history of other specified conditions     History of shortness of breath    Personal history of other specified conditions 08/27/2013    History  of edema    Posterior epistaxis 03/10/2024    Puckering of macula, right eye 10/24/2022    ERM OD (epiretinal membrane, right eye)    Raynaud's syndrome without gangrene 07/02/2013    Raynaud's disease    Sinusitis 03/10/2024    Systemic lupus erythematosus, unspecified (Multi) 07/24/2015    SLE (systemic lupus erythematosus)    Systemic lupus erythematosus, unspecified (Multi) 07/24/2015    SLE (systemic lupus erythematosus)    Systemic lupus erythematosus, unspecified (Multi) 07/24/2015    Systemic lupus    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) 07/02/2013    Type 2 diabetes with nephropathy    Type 2 diabetes mellitus with mild nonproliferative diabetic retinopathy without macular edema, left eye (Multi) 07/27/2015    Non-proliferative diabetic retinopathy, left eye    Type 2 diabetes mellitus with mild nonproliferative diabetic retinopathy without macular edema, unspecified eye (Multi) 07/24/2015    Mild non proliferative diabetic retinopathy    Type 2 diabetes mellitus with proliferative diabetic retinopathy without macular edema, right eye (Multi) 07/27/2015    Proliferative diabetic retinopathy of right eye    Type 2 diabetes mellitus with proliferative diabetic retinopathy without macular edema, unspecified eye (Multi) 07/24/2015    Diabetic proliferative retinopathy    Unspecified acute and subacute iridocyclitis 07/24/2015    Acute iritis, right eye    Unspecified open wound, left foot, sequela 07/03/2018    Wound, open, foot, left, sequela     PAST SURGICAL HISTORY:   Past Surgical History:   Procedure Laterality Date    ANKLE SURGERY  01/29/2015    Ankle Surgery    CARDIAC ELECTROPHYSIOLOGY PROCEDURE Left 5/17/2024    Procedure: PPM IMPLANT DUAL;  Surgeon: Antonio Rodriges MD;  Location: [facility];  Service: Electrophysiology;  Laterality: Left;  Pt. needs platelets and Prednisone prior to procedure    CHOLECYSTECTOMY  01/29/2015    Cholecystectomy    CT GUIDED PERCUTANEOUS  BIOPSY BONE DEEP  5/4/2021    CT GUIDED PERCUTANEOUS BIOPSY BONE DEEP 5/4/2021 [facility] CLINICAL LEGACY    EYE SURGERY  03/06/2015    Eye Surgery    FOOT SURGERY  01/29/2015    Foot Surgery    MR HEAD ANGIO WO IV CONTRAST  7/26/2013    MR HEAD ANGIO WO IV CONTRAST 7/26/2013 [facility] CLINICAL LEGACY    MR HEAD ANGIO WO IV CONTRAST  9/17/2021    MR HEAD ANGIO WO IV CONTRAST 9/17/2021 [facility]    MR HEAD ANGIO WO IV CONTRAST  3/25/2023    MR HEAD ANGIO WO IV CONTRAST [facility]    MR NECK ANGIO WO IV CONTRAST  7/26/2013    MR NECK ANGIO WO IV CONTRAST 7/26/2013 [facility] CLINICAL LEGACY    MR NECK ANGIO WO IV CONTRAST  9/17/2021    MR NECK ANGIO WO IV CONTRAST 9/17/2021 [facility]    MR NECK ANGIO WO IV CONTRAST  3/25/2023    MR NECK ANGIO WO IV CONTRAST [facility]    OTHER SURGICAL HISTORY  01/29/2015    Creation Of Pericardial Window    OTHER SURGICAL HISTORY  01/29/2015    Quadricepsplasty    TOTAL HIP ARTHROPLASTY  01/29/2015    Hip Replacement     FAMILY HISTORY:   Family History   Problem Relation Name Age of Onset    Other (RENAL DISEASE) Mother          END STAGE    Other (CARDIAC DISORDER) Mother      Cataracts Mother      Stroke Mother      Diabetes Mother      Kidney disease Mother      Lupus Mother      Other (CARDIAC DISORDER) Father      COPD Father      Glaucoma Father      Hypertension Father      Sleep apnea Father      Other (CARDIAC DISORDER) Sister      Depression Sister      Kidney disease Sister      Sickle cell trait Sister      Sleep apnea Daughter       SOCIAL HISTORY:   Social History     Tobacco Use    Smoking status: Never     Passive exposure: Never    Smokeless tobacco: Never   Vaping Use    Vaping status: Never Used   Substance Use Topics    Alcohol use: Not Currently     Comment: RARE    Drug use: Never     CURRENT ALLERGIES:   Allergies as of 09/30/2024 - Reviewed 09/30/2024   Allergen Reaction Noted    Ace inhibitors Shortness of breath, Swelling, Other, and Angioedema 01/16/2014     Hydroxychloroquine Other and Nausea/vomiting 04/06/2022    Lisinopril Swelling 08/01/2023    Sulfa (sulfonamide antibiotics) Hives 04/13/2023    Penicillins Unknown 01/19/2004     MEDICATIONS:    Current Facility-Administered Medications:     [Held by provider] atorvastatin (Lipitor) tablet 40 mg, 40 mg, oral, Nightly, Lauri Silverio DO    [Held by provider] budesonide (Pulmicort) 0.5 mg/2 mL nebulizer solution 0.5 mg, 0.5 mg, nebulization, BID, Lauri Silverio DO    calcium gluconate 1 g in sodium chloride (iso) IV 50 mL, 1 g, intravenous, q6h PRN, Andrew Aranda MD    calcium gluconate 2 g in sodium chloride (iso)  mL, 2 g, intravenous, q6h PRN, Andrew Aranda MD    cefTRIAXone (Rocephin) 2 g in dextrose (iso) IV 50 mL, 2 g, intravenous, q24h, Jean Cason MD    dextrose 50 % injection 12.5 g, 12.5 g, intravenous, q15 min PRN, Joao Stack APRN-CNP    dextrose 50 % injection 25 g, 25 g, intravenous, q15 min PRN, Joao Stack APRN-CNP    flu vaccine trivalent (PF) (Fluarix/Fluzone/Flulaval) 6 months or greater injection, 0.5 mL, intramuscular, During hospitalization, Brendon Guillaume MD    [Held by provider] furosemide (Lasix) tablet 40 mg, 40 mg, oral, Daily, Lauri Silverio DO    glucagon (Glucagen) injection 1 mg, 1 mg, intramuscular, q15 min PRN, Joao Stack APRN-CNP    glucagon (Glucagen) injection 1 mg, 1 mg, intramuscular, q15 min PRN, Joao Stack APRN-CNP    heparin (porcine) injection 5,000 Units, 5,000 Units, subcutaneous, q8h, JIM Alba-CNP, 5,000 Units at 10/01/24 0902    hydrocortisone sodium succinate (PF) (Solu-CORTEF) injection 50 mg, 50 mg, intravenous, q6h, Joao Stack APRN-CNP, 50 mg at 10/01/24 1244    [Held by provider] ipratropium-albuteroL (Duo-Neb) 0.5-2.5 mg/3 mL nebulizer solution 3 mL, 3 mL, nebulization, q4h PRN, Lauri Silverio,     [Held by provider] levETIRAcetam (Keppra) tablet 500 mg, 500 mg, oral, BID, Lauri  BriaerDO    magnesium sulfate 2 g in sterile water for injection 50 mL, 2 g, intravenous, q6h PRN, Andrew Aranda MD    magnesium sulfate 4 g in sterile water for injection 100 mL, 4 g, intravenous, q6h PRN, Andrew Aranda MD    [Held by provider] metoprolol tartrate (Lopressor) tablet 25 mg, 25 mg, oral, BID, Lauri HostofferDO    norepinephrine (Levophed) 8 mg in dextrose 5% 250 mL (0.032 mg/mL) infusion (premix), 0-0.5 mcg/kg/min, intravenous, Continuous, Angus Henry MD, Last Rate: 1.6 mL/hr at 10/01/24 1245, 0.01 mcg/kg/min at 10/01/24 1245    [DISCONTINUED] pantoprazole (ProtoNix) EC tablet 40 mg, 40 mg, oral, Daily before breakfast **OR** [DISCONTINUED] esomeprazole (NexIUM) suspension 40 mg, 40 mg, nasoduodenal tube, Daily before breakfast **OR** pantoprazole (ProtoNix) injection 40 mg, 40 mg, intravenous, Daily before breakfast, Joao Stack, APRN-CNP, 40 mg at 10/01/24 0745    [Held by provider] polyethylene glycol (Glycolax, Miralax) packet 17 g, 17 g, oral, Daily, Lauri FrasererDO    potassium chloride CR (Klor-Con M20) ER tablet 20 mEq, 20 mEq, oral, q6h PRN **OR** potassium chloride (Klor-Con) packet 20 mEq, 20 mEq, oral, q6h PRN, Andrew Aranda MD    potassium chloride CR (Klor-Con M20) ER tablet 40 mEq, 40 mEq, oral, q6h PRN **OR** potassium chloride (Klor-Con) packet 40 mEq, 40 mEq, oral, q6h PRN, Andrew Aranda MD    potassium chloride 20 mEq in sterile water for injection 100 mL, 20 mEq, intravenous, q6h PRN, Andrew Aranda MD    [Held by provider] risperiDONE (RisperDAL) tablet 0.25 mg, 0.25 mg, oral, Daily, Lauri Silverio DO    [Held by provider] sertraline (Zoloft) tablet 25 mg, 25 mg, oral, Daily, Lauri Silverio,     vancomycin (Vancocin) pharmacy to dose - pharmacy monitoring, , miscellaneous, Daily PRN, JIM Alba-CNP    vasopressin (Vasostrict) 0.2 unit/mL in 5% dextrose 100 mL IV, 0.03 Units/min, intravenous, Continuous, Angus Henry MD, Stopped at  10/01/24 5736       COMPLETE REVIEW OF SYSTEMS:    As above.    PHYSICAL EXAM:  Patient Vitals for the past 24 hrs:   BP Temp Temp src Pulse Resp SpO2 Height Weight   10/01/24 1300 -- -- -- 100 17 99 % -- --   10/01/24 1245 -- -- -- 108 (!) 31 99 % -- --   10/01/24 1230 -- -- -- 100 21 100 % -- --   10/01/24 1215 -- -- -- 104 24 98 % -- --   10/01/24 1200 -- 36.1 °C (97 °F) Temporal 106 21 98 % -- --   10/01/24 1145 -- -- -- (!) 112 (!) 28 98 % -- --   10/01/24 1130 -- -- -- 110 (!) 28 98 % -- --   10/01/24 1115 -- -- -- (!) 114 24 98 % -- --   10/01/24 1100 -- -- -- (!) 112 (!) 30 98 % -- --   10/01/24 1045 -- -- -- (!) 114 (!) 27 96 % -- --   10/01/24 1030 -- -- -- 110 (!) 32 98 % -- --   10/01/24 1015 -- -- -- 102 (!) 31 97 % -- --   10/01/24 1000 -- -- -- (!) 112 (!) 32 98 % -- --   10/01/24 0945 -- -- -- (!) 120 21 94 % -- --   10/01/24 0930 -- 36.2 °C (97.2 °F) -- 90 24 100 % -- --   10/01/24 0915 -- 36 °C (96.8 °F) -- 88 21 100 % -- --   10/01/24 0900 -- 36.2 °C (97.2 °F) -- 90 (!) 28 100 % -- --   10/01/24 0845 -- 36.2 °C (97.2 °F) -- 94 16 100 % -- --   10/01/24 0830 -- 36.2 °C (97.2 °F) -- 92 19 100 % -- --   10/01/24 0815 -- 36.2 °C (97.2 °F) -- 96 15 100 % -- --   10/01/24 0800 -- 36.2 °C (97.2 °F) -- 94 15 100 % -- --   10/01/24 0700 -- 36.4 °C (97.5 °F) -- 100 17 97 % -- --   10/01/24 0645 -- 36.6 °C (97.9 °F) -- 94 16 97 % -- --   10/01/24 0631 -- 36.6 °C (97.9 °F) -- 90 17 96 % -- --   10/01/24 0630 -- 36.6 °C (97.9 °F) -- 94 16 97 % -- --   10/01/24 0627 -- 36.6 °C (97.9 °F) -- 92 15 96 % -- --   10/01/24 0624 -- 36.6 °C (97.9 °F) -- 92 16 96 % -- --   10/01/24 0623 -- 36.6 °C (97.9 °F) -- 94 16 96 % -- --   10/01/24 0621 -- 36.6 °C (97.9 °F) -- 92 16 96 % -- --   10/01/24 0615 -- 36.6 °C (97.9 °F) -- 96 17 96 % -- --   10/01/24 0600 -- 36.8 °C (98.2 °F) -- 98 19 95 % -- 85.8 kg (189 lb 2.5 oz)   10/01/24 0545 -- 36.8 °C (98.2 °F) -- 110 (!) 86 96 % -- --   10/01/24 0530 -- 36.8 °C (98.2 °F) -- (!)  112 (!) 63 95 % -- --   10/01/24 0515 -- 37 °C (98.6 °F) -- 110 (!) 31 96 % -- --   10/01/24 0500 -- 37 °C (98.6 °F) -- 90 19 96 % -- --   10/01/24 0445 -- 37 °C (98.6 °F) -- (!) 116 (!) 48 96 % -- --   10/01/24 0430 -- 37.2 °C (99 °F) -- 90 21 96 % -- --   10/01/24 0415 -- 37.2 °C (99 °F) -- 100 25 97 % -- --   10/01/24 0400 -- 37.2 °C (99 °F) -- 96 (!) 28 95 % -- --   10/01/24 0345 -- 37.4 °C (99.3 °F) -- 94 26 96 % -- --   10/01/24 0330 -- 37.4 °C (99.3 °F) -- 94 (!) 27 96 % -- --   10/01/24 0315 -- 37.4 °C (99.3 °F) -- 98 26 96 % -- --   10/01/24 0300 -- 37.6 °C (99.7 °F) -- 108 (!) 28 96 % -- --   10/01/24 0245 -- 37.6 °C (99.7 °F) -- (!) 122 (!) 33 96 % -- --   10/01/24 0230 -- 37.6 °C (99.7 °F) -- (!) 138 (!) 39 92 % -- --   10/01/24 0215 -- 37.8 °C (100 °F) -- (!) 118 (!) 27 92 % -- --   10/01/24 0200 -- 37.8 °C (100 °F) -- 102 (!) 31 93 % -- --   10/01/24 0145 -- 37.8 °C (100 °F) -- 98 25 93 % -- --   10/01/24 0135 -- 37.8 °C (100 °F) -- 98 (!) 33 94 % -- --   10/01/24 0130 -- 37.8 °C (100 °F) -- 100 (!) 33 95 % -- --   10/01/24 0115 -- 37.8 °C (100 °F) -- 104 (!) 28 96 % -- --   10/01/24 0110 -- 37.8 °C (100 °F) -- 108 (!) 31 96 % -- --   10/01/24 0100 -- 37.6 °C (99.7 °F) -- (!) 112 (!) 32 96 % -- --   10/01/24 0045 -- 37.6 °C (99.7 °F) -- (!) 116 (!) 31 97 % -- --   10/01/24 0030 -- 37.4 °C (99.3 °F) -- (!) 114 (!) 29 98 % -- --   10/01/24 0015 -- 37.2 °C (99 °F) -- 108 (!) 31 96 % -- --   10/01/24 0000 -- 37.2 °C (99 °F) -- 106 (!) 29 95 % -- --   09/30/24 2345 -- 37 °C (98.6 °F) -- 108 (!) 28 96 % -- --   09/30/24 2338 -- 36.8 °C (98.2 °F) -- (!) 118 (!) 29 95 % -- --   09/30/24 2330 -- 36.8 °C (98.2 °F) -- (!) 112 26 96 % -- --   09/30/24 2315 -- 36.6 °C (97.9 °F) -- 110 (!) 28 99 % -- --   09/30/24 2300 -- 36.6 °C (97.9 °F) -- 110 25 97 % -- --   09/30/24 2246 -- -- -- 58 -- -- -- --   09/30/24 2245 -- 36.4 °C (97.5 °F) -- 108 (!) 28 95 % -- --   09/30/24 2230 -- 36.4 °C (97.5 °F) -- 96 25 97 % -- --  "  09/30/24 2215 -- 36.2 °C (97.2 °F) -- 94 24 97 % -- --   09/30/24 2200 -- 36.2 °C (97.2 °F) -- 92 26 94 % -- --   09/30/24 2145 -- 36 °C (96.8 °F) -- 84 21 (!) 83 % -- --   09/30/24 2139 95/58 -- -- -- -- -- -- --   09/30/24 2130 -- 36 °C (96.8 °F) -- 80 19 -- -- --   09/30/24 2115 -- 35.8 °C (96.4 °F) -- 88 26 -- -- --   09/30/24 2100 -- 35.8 °C (96.4 °F) Bladder 98 (!) 30 -- -- --   09/30/24 2045 -- 35.6 °C (96.1 °F) -- 90 (!) 35 96 % -- --   09/30/24 2030 -- 35.4 °C (95.7 °F) -- 104 (!) 30 93 % -- --   09/30/24 2015 102/63 35.2 °C (95.4 °F) -- 90 (!) 28 93 % -- --   09/30/24 2000 77/50 35 °C (95 °F) Bladder 88 25 90 % -- --   09/30/24 1945 92/64 34.8 °C (94.6 °F) -- 78 26 92 % -- --   09/30/24 1930 (!) 71/43 34.8 °C (94.6 °F) -- 90 22 95 % -- --   09/30/24 1915 99/66 34.6 °C (94.3 °F) -- 88 19 92 % -- --   09/30/24 1900 102/64 34.4 °C (93.9 °F) -- 76 16 92 % -- --   09/30/24 1845 90/50 34.2 °C (93.6 °F) -- 76 19 97 % -- --   09/30/24 1830 110/73 34 °C (93.2 °F) -- 78 20 96 % -- --   09/30/24 1815 98/57 (!) 33.8 °C (92.8 °F) -- 76 21 97 % -- --   09/30/24 1800 99/70 (!) 33.6 °C (92.5 °F) -- 74 18 95 % -- --   09/30/24 1759 -- -- -- -- -- -- -- 84.7 kg (186 lb 11.7 oz)   09/30/24 1745 95/60 (!) 33.4 °C (92.1 °F) -- 72 18 92 % -- --   09/30/24 1730 93/60 (!) 33.4 °C (92.1 °F) -- 74 18 95 % -- --   09/30/24 1715 74/52 (!) 33.4 °C (92.1 °F) -- 74 18 95 % -- --   09/30/24 1700 90/58 (!) 33.2 °C (91.8 °F) -- 94 (!) 33 96 % -- --   09/30/24 1645 99/75 (!) 33.2 °C (91.8 °F) -- (!) 112 26 94 % -- --   09/30/24 1630 98/67 (!) 33 °C (91.4 °F) Bladder 96 (!) 28 96 % -- --   09/30/24 1615 (!) 139/44 -- -- 100 (!) 32 96 % -- --   09/30/24 1600 109/69 -- -- 102 25 95 % -- --   09/30/24 1550 109/79 (!) 32.8 °C (91.1 °F) Rectal 86 (!) 36 96 % 1.803 m (5' 11\") 84.7 kg (186 lb 11.7 oz)     Body mass index is 26.38 kg/m².    Gen: NAD  Neck: symmetric, no mass  Cardiovascular: Irregular rate and rhythm  Respiratory: CTAB, no obvious " wheezes or rhonchi  GI: Abd soft, nontender, non-distended, BS+  Extremities: Bilateral nonpitting edema of the lower extremities  Skin: Warm and dry.  Neuro: Lethargic, intermittently moaning, briefly opens eyes to verbal stimuli  : no ruano, external catheter in place    Labs:  Lab Results   Component Value Date    WBC 11.8 (H) 10/01/2024    HGB 7.6 (L) 10/01/2024    HCT 25.3 (L) 10/01/2024    MCV 96 10/01/2024    PLT 91 (L) 10/01/2024     Lab Results   Component Value Date    GLUCOSE 201 (H) 10/01/2024    CALCIUM 8.6 10/01/2024     10/01/2024    K 3.4 (L) 10/01/2024    CO2 23 10/01/2024     10/01/2024    BUN 33 (H) 10/01/2024    CREATININE 1.97 (H) 10/01/2024   ESR: --  Lab Results   Component Value Date    SEDRATE 2 07/22/2024     Lab Results   Component Value Date    CRP 2.57 (H) 07/22/2024     Lab Results   Component Value Date    ALT 8 09/30/2024    AST 13 09/30/2024    GGT 63 (H) 01/07/2021    ALKPHOS 108 09/30/2024    BILITOT 0.6 09/30/2024     DATA:   Diagnostic tests reviewed for today's visit:    Labs this admission reviewed  Imagings this admission reviewed  Cultures: Reviewed    ASSESSMENT :   # Septic shock due to UTI  # Gram positive bacteremia  # Leukocytosis  # Lactic acidosis - resolved  # Troponinemia  # Hx of 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 Culture pending.  - Procal 0.41  - Repeat cultures ordered, will follow  - Continue Vancomycin and Rocephin  - Closely monitor for antibiotics  "side effects including rash, Diarrhea/CDI, thrombocytopenia, JED, etc.  - Defer to primary regarding management of other comorbid conditions    Will continue to follow.    Thank you so much for this very interesting consultation!    The assessment and plan were discussed with the attending physician,  Cheko Angel, DO  Internal Medicine PGY-2    This note was partially created using voice recognition software and is inherently subject to errors including those of syntax and \"sound-alike\" substitutions which may escape proofreading. In such instances, original meaning may be extrapolated by contextual derivation   "

## 2024-10-01 NOTE — PROGRESS NOTES
Critical Care Daily Progress        Subjective   Patient is a 63 y.o. female admitted on 9/30/2024  9:01 AM with the following indication(s) for ICU care Septic shock refractory to IVF requiring vasopressor therapy in setting of adrenal insufficiency.     Overnight Events: Vasopressin has been weaned off. Bear hugger off.    Complaints: has none..     Scheduled Medications:   cefTRIAXone, 1 g, intravenous, q24h  pantoprazole, 40 mg, oral, Daily before breakfast   Or  esomeprazole, 40 mg, nasoduodenal tube, Daily before breakfast   Or  pantoprazole, 40 mg, intravenous, Daily before breakfast  influenza, 0.5 mL, intramuscular, During hospitalization  heparin (porcine), 5,000 Units, subcutaneous, q8h  hydrocortisone sodium succinate, 50 mg, intravenous, q6h  insulin lispro, 0-5 Units, subcutaneous, q4h  magnesium sulfate, 4 g, intravenous, Once         Continuous Medications:   norepinephrine, 0-0.5 mcg/kg/min, Last Rate: 0.08 mcg/kg/min (10/01/24 0657)  vasopressin, 0.03 Units/min, Last Rate: Stopped (10/01/24 9935)         PRN Medications:   PRN medications: calcium gluconate, calcium gluconate, dextrose, dextrose, glucagon, glucagon, magnesium sulfate, magnesium sulfate, potassium chloride CR **OR** potassium chloride, potassium chloride CR **OR** potassium chloride, potassium chloride    Objective   Vitals:  Temp  Min: 32.8 °C (91.1 °F)  Max: 37.8 °C (100 °F)  Pulse  Min: 58  Max: 138  BP  Min: 47/39  Max: 139/44  Resp  Min: 12  Max: 86  SpO2  Min: 82 %  Max: 100 %    Physical Exam:   Physical Exam   Physical Exam  Constitutional: A&Ox0, moaning with more verbal   Head and Face: Atraumatic, normocephalic   Eyes: Pupils dilate appropriately to light  ENT: Dry mucous membranes, trachea midline  Cardiovascular: irregularly irregular, S1/S2, no murmurs, rubs, or gallops, radial pulses +2  Pulmonary: CTAB, no respiratory distress, no wheezing, rales or rhonchi, on RA  Abdomen: +BS, soft, non-tender, nondistended, no  guarding rigidity or rebound tenderness, no masses noted  MSK: Nonpitting edema bilaterally, No joint swelling, normal movements of all extremities.   Neuro: Does not follow command, withdraws and localizes pain, opens eyes to pain/verbal  Skin- No lesions, contusions, or erythema.         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     Cardiac: Mixed Septic Shock 2/2 UTI with Distributive shock 2/2 Adrenal insufficiency, Elevated troponin without ST depressions or elevations likely demand ischemia  - History: HFmREF (EF 40-45% 5/2024), bradycardia 2/2 sinus node dysfunction s/p pacemaker (5/17/2024), CAD s/p CABG 2013, A-fib (previously on Eliquis but off due to thrombocytopenia)  - Goals:  [MAP> 65, HR ]  - Home meds: Atorvastatin, metoprolol  - Last echo: 40-45% 5/2024   - Pressors: Levophed (titrate as tolerated)  - Trend troponin, repeat ECG to look for evidence of myocardial ischemia     Pulmonary: No active concerns  -History: COPD  -Home meds: Albuterol      Continuous pulse oximetry   O2 PRN to maintain SpO2 > 94%, wean as tolerated  -Imaging: CXR negative     Gastrointestinal: No active concerns  -History: GERD  -Home meds: Pantoprazole  - Diet: N.p.o.   - Supplementation: None  - Prophylaxis: protonix  - Bowel regimen: MiraLAX  - Last BM: Last BM Date: 09/30/24  - plan to restart PO meds once mentation improves, if no improvement by evening will have to place NG tube       Renal: Mild Hypernatremia resolved, Lactic acidosis, resolved   - Daily RFP,Mg,Phos  - History: CKD 3 (BL CR 1.5-1.9)  - Home meds: Lasix  - remove ruano  Net IO Since Admission:  2,091.51 mL [10/01/24 0748]  Results from last 72 hours   Lab Units 10/01/24  0339 24  0944   BUN mg/dL 33* 34*   CREATININE mg/dL 1.97* 1.87*       - Fluids: Hold  - Electrolytes: Replete per protocol, goal K>4 Phos >3 Mg >2        Endocrine: Adrenal insufficiency likely secondary to infection  -History: recurrent adrenal insufficiency (hydrocortisone), 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   -Home meds:  Lantus 10 units in a.m., sliding scale, Cortef 20 mg in a.m./20 mg p.m., flu cortisone 0.1 mg every other day   -sliding scale: Holding in setting of hypoglycemia  Results from last 7 days   Lab Units 10/01/24  0350 10/01/24  0339 24  2356 24  2113 24  1640 24  1145 24  0944   POCT GLUCOSE mg/dL 185*  --  241* 301* 206* 198*  --    GLUCOSE mg/dL  --  201*  --   --   --   --  43*      -BG < 180 goal  -Hypoglycemia protocol  -Solu-Cortef 50 every 6  -Hold mycophenolate in setting of infection    Hematology: No active concerns  - Daily CBC, coags  -History: Pancytopenia, thrombocytopenia   -Home meds: Heparin  Results from last 7 days   Lab Units 10/01/24  03324  0944   WBC AUTO x10*3/uL 11.8* 5.8   HEMOGLOBIN g/dL 7.6* 7.9*   HEMATOCRIT % 25.3* 26.8*   PLATELETS AUTO x10*3/uL 91* 64*     - DVT Prophylaxis: heparin    Infectious Disease: Septic shock 2/2 UTI  -History: Multiple cellulitis/UTI  -Home meds: None  Temp (24hrs), Av.2 °C (97.1 °F), Min:32.8 °C (91.1 °F), Max:37.8 °C (100 °F)     -ID consulted   -Abx: Rocephin (-), Vancomycin (10/1-)  -Cultures:               Blood culture (): G+ cocci in clusters 2/4              Urine culture (): Pending      LDA:  Arterial Line 24 Right Radial (Active)   Placement Date/Time: 24   Orientation: Right  Location: Radial   Number of days: 0       Urethral Catheter Double-lumen;Temperature probe 16 Fr. (Active)   Placement Date/Time: 24 162   Placed by: Che Herrera RN  Hand  Hygiene Completed: Yes  Catheter Type: Double-lumen;Temperature probe  Tube Size (Fr.): 16 Fr.  Catheter Balloon Size: 10 mL  Urine Returned: Yes   Number of days: 0         CODE STATUS: DNR and No Intubation    Disposition: Pt to remain in ICU while requiring vasopressor therapy    ABCDEF Checklist  Analgesia: Spontaneous awakening trial to be pursued if clinically appropriate. RASS goal reviewed  Breathing: Spontaneous breathing trial to be pursued if clinically appropriate. Mechanical power of assisted ventilation reviewed  Choice of analgesia/sedation: Analgesic and sedative agents adjusted per clinical context.   Delirium assessed by CAM, will avoid exacerbating factors  Early mobility and exercise: Physical and occupational therapy engaged  Family: Plan of care, overall trajectory of patient shared with family. Questions elicited and answered as appropriate.       Due to the high probability of life threatening clinical decompensation, the patient required critical care time evaluating and managing this patient.  Critical care time included obtaining a history, examining the patient, ordering and reviewing studies, discussing, developing, and implementing a management plan, evaluating the patient's response to treatment, and discussion with other care team providers. I saw and evaluated the patient myself.  Critical care time was performed exclusive of billable procedures.    RESTRAINTS: type -   - None      Critical care time: 60 minutes      Case discussed with attending , Dr. Bob Silverio, DO  Internal Medicine, PGY-2

## 2024-10-02 ENCOUNTER — APPOINTMENT (OUTPATIENT)
Dept: RADIOLOGY | Facility: HOSPITAL | Age: 64
End: 2024-10-02
Payer: MEDICARE

## 2024-10-02 LAB
ABO GROUP (TYPE) IN BLOOD: NORMAL
ALBUMIN SERPL BCP-MCNC: 2.7 G/DL (ref 3.4–5)
ANION GAP SERPL CALC-SCNC: 15 MMOL/L (ref 10–20)
ANTIBODY SCREEN: NORMAL
ATRIAL RATE: 107 BPM
ATRIAL RATE: 267 BPM
BLOOD EXPIRATION DATE: NORMAL
BUN SERPL-MCNC: 32 MG/DL (ref 6–23)
CALCIUM SERPL-MCNC: 8.6 MG/DL (ref 8.6–10.3)
CHLORIDE SERPL-SCNC: 109 MMOL/L (ref 98–107)
CO2 SERPL-SCNC: 23 MMOL/L (ref 21–32)
CREAT SERPL-MCNC: 2.01 MG/DL (ref 0.5–1.05)
DISPENSE STATUS: NORMAL
EGFRCR SERPLBLD CKD-EPI 2021: 28 ML/MIN/1.73M*2
ERYTHROCYTE [DISTWIDTH] IN BLOOD BY AUTOMATED COUNT: 22.8 % (ref 11.5–14.5)
GLUCOSE BLD MANUAL STRIP-MCNC: 129 MG/DL (ref 74–99)
GLUCOSE BLD MANUAL STRIP-MCNC: 150 MG/DL (ref 74–99)
GLUCOSE SERPL-MCNC: 145 MG/DL (ref 74–99)
HCT VFR BLD AUTO: 23 % (ref 36–46)
HGB BLD-MCNC: 6.8 G/DL (ref 12–16)
MAGNESIUM SERPL-MCNC: 2.62 MG/DL (ref 1.6–2.4)
MCH RBC QN AUTO: 28.5 PG (ref 26–34)
MCHC RBC AUTO-ENTMCNC: 29.6 G/DL (ref 32–36)
MCV RBC AUTO: 96 FL (ref 80–100)
NRBC BLD-RTO: 1.3 /100 WBCS (ref 0–0)
PHOSPHATE SERPL-MCNC: 4.2 MG/DL (ref 2.5–4.9)
PLATELET # BLD AUTO: 76 X10*3/UL (ref 150–450)
POTASSIUM SERPL-SCNC: 3.8 MMOL/L (ref 3.5–5.3)
PRODUCT BLOOD TYPE: 9500
PRODUCT CODE: NORMAL
Q ONSET: 214 MS
Q ONSET: 217 MS
QRS COUNT: 14 BEATS
QRS COUNT: 14 BEATS
QRS DURATION: 104 MS
QRS DURATION: 94 MS
QT INTERVAL: 416 MS
QT INTERVAL: 434 MS
QTC CALCULATION(BAZETT): 495 MS
QTC CALCULATION(BAZETT): 516 MS
QTC FREDERICIA: 467 MS
QTC FREDERICIA: 487 MS
R AXIS: -5 DEGREES
R AXIS: -5 DEGREES
RBC # BLD AUTO: 2.39 X10*6/UL (ref 4–5.2)
RH FACTOR (ANTIGEN D): NORMAL
SODIUM SERPL-SCNC: 143 MMOL/L (ref 136–145)
STAPHYLOCOCCUS SPEC CULT: ABNORMAL
T AXIS: 197 DEGREES
T AXIS: 270 DEGREES
T OFFSET: 425 MS
T OFFSET: 431 MS
UNIT ABO: NORMAL
UNIT NUMBER: NORMAL
UNIT RH: NORMAL
UNIT VOLUME: 350
VENTRICULAR RATE: 85 BPM
VENTRICULAR RATE: 85 BPM
WBC # BLD AUTO: 7.7 X10*3/UL (ref 4.4–11.3)
XM INTEP: NORMAL

## 2024-10-02 PROCEDURE — 85027 COMPLETE CBC AUTOMATED: CPT

## 2024-10-02 PROCEDURE — 2500000004 HC RX 250 GENERAL PHARMACY W/ HCPCS (ALT 636 FOR OP/ED): Performed by: INTERNAL MEDICINE

## 2024-10-02 PROCEDURE — 74018 RADEX ABDOMEN 1 VIEW: CPT | Performed by: STUDENT IN AN ORGANIZED HEALTH CARE EDUCATION/TRAINING PROGRAM

## 2024-10-02 PROCEDURE — 74018 RADEX ABDOMEN 1 VIEW: CPT

## 2024-10-02 PROCEDURE — 2500000001 HC RX 250 WO HCPCS SELF ADMINISTERED DRUGS (ALT 637 FOR MEDICARE OP)

## 2024-10-02 PROCEDURE — 86901 BLOOD TYPING SEROLOGIC RH(D): CPT

## 2024-10-02 PROCEDURE — 2500000001 HC RX 250 WO HCPCS SELF ADMINISTERED DRUGS (ALT 637 FOR MEDICARE OP): Performed by: NURSE PRACTITIONER

## 2024-10-02 PROCEDURE — 2060000001 HC INTERMEDIATE ICU ROOM DAILY

## 2024-10-02 PROCEDURE — 86920 COMPATIBILITY TEST SPIN: CPT

## 2024-10-02 PROCEDURE — 37799 UNLISTED PX VASCULAR SURGERY: CPT

## 2024-10-02 PROCEDURE — 36430 TRANSFUSION BLD/BLD COMPNT: CPT

## 2024-10-02 PROCEDURE — 82947 ASSAY GLUCOSE BLOOD QUANT: CPT

## 2024-10-02 PROCEDURE — P9016 RBC LEUKOCYTES REDUCED: HCPCS

## 2024-10-02 PROCEDURE — 83735 ASSAY OF MAGNESIUM: CPT

## 2024-10-02 PROCEDURE — 2500000004 HC RX 250 GENERAL PHARMACY W/ HCPCS (ALT 636 FOR OP/ED)

## 2024-10-02 PROCEDURE — 80069 RENAL FUNCTION PANEL: CPT

## 2024-10-02 PROCEDURE — 2500000002 HC RX 250 W HCPCS SELF ADMINISTERED DRUGS (ALT 637 FOR MEDICARE OP, ALT 636 FOR OP/ED): Performed by: NURSE PRACTITIONER

## 2024-10-02 RX ORDER — LEVETIRACETAM 500 MG/1
500 TABLET ORAL 2 TIMES DAILY
Status: DISCONTINUED | OUTPATIENT
Start: 2024-10-02 | End: 2024-10-03

## 2024-10-02 RX ORDER — SERTRALINE HYDROCHLORIDE 50 MG/1
25 TABLET, FILM COATED ORAL DAILY
Status: DISCONTINUED | OUTPATIENT
Start: 2024-10-03 | End: 2024-10-03

## 2024-10-02 RX ORDER — RISPERIDONE 0.5 MG/1
0.25 TABLET ORAL DAILY
Status: DISCONTINUED | OUTPATIENT
Start: 2024-10-03 | End: 2024-10-03

## 2024-10-02 ASSESSMENT — PAIN SCALES - PAIN ASSESSMENT IN ADVANCED DEMENTIA (PAINAD)
BREATHING: NORMAL
BODYLANGUAGE: RELAXED
TOTALSCORE: 0
FACIALEXPRESSION: SMILING OR INEXPRESSIVE
CONSOLABILITY: NO NEED TO CONSOLE

## 2024-10-02 ASSESSMENT — PAIN - FUNCTIONAL ASSESSMENT
PAIN_FUNCTIONAL_ASSESSMENT: CPOT (CRITICAL CARE PAIN OBSERVATION TOOL)

## 2024-10-02 NOTE — NURSING NOTE
1939 Map 82, levophed at 0.01mcg/kg/min stopped.  Will cont to monitor vitals    2120 NG 16french inserted to rt nare at 65cm without difficulty. Awaiting KUB to be done.  Pt appears restful in bed   Will cont to monitor

## 2024-10-02 NOTE — PROGRESS NOTES
Critical Care Daily Progress        Subjective   Patient is a 63 y.o. female admitted on 9/30/2024  9:01 AM with the following indication(s) for ICU care Septic shock refractory to IVF requiring vasopressor therapy in setting of adrenal insufficiency.     Overnight Events: Vasopressors weaned off approximately 9PM 10/01. NG tube placed to resume some home meds. Hypothermic overnight and bear hugger re-initiated.     Complaints: has none..     Scheduled Medications:   atorvastatin, 40 mg, nasogastric tube, Nightly  [Held by provider] budesonide, 0.5 mg, nebulization, BID  cefTRIAXone, 2 g, intravenous, q24h  influenza, 0.5 mL, intramuscular, During hospitalization  [Held by provider] furosemide, 40 mg, oral, Daily  heparin (porcine), 5,000 Units, subcutaneous, q8h  hydrocortisone sodium succinate, 50 mg, intravenous, q6h  levETIRAcetam, 500 mg, nasogastric tube, BID  [Held by provider] metoprolol tartrate, 25 mg, oral, BID  pantoprazole, 40 mg, intravenous, Daily before breakfast  [Held by provider] polyethylene glycol, 17 g, oral, Daily  risperiDONE, 0.25 mg, nasogastric tube, Daily  sertraline, 25 mg, nasogastric tube, Daily         Continuous Medications:   norepinephrine, 0-0.5 mcg/kg/min, Last Rate: Stopped (10/01/24 1939)         PRN Medications:   PRN medications: calcium gluconate, calcium gluconate, dextrose, dextrose, glucagon, glucagon, [Held by provider] ipratropium-albuteroL, magnesium sulfate, magnesium sulfate, potassium chloride CR **OR** potassium chloride, potassium chloride CR **OR** potassium chloride, potassium chloride    Objective   Vitals:  Temp  Min: 34.8 °C (94.6 °F)  Max: 36.1 °C (97 °F)  Pulse  Min: 66  Max: 100  BP  Min: 110/68  Max: 110/68  Resp  Min: 10  Max: 30  SpO2  Min: 94 %  Max: 100 %    Physical Exam:   Physical Exam   Physical Exam  Constitutional: A&Ox0, appears comfortable. Opening eyes to command, no longer moaning. Appears more responsive and awake.  Head and Face:  Atraumatic, normocephalic   Eyes: Pupils dilate appropriately to light  ENT: Dry mucous membranes, trachea midline  Cardiovascular: irregularly irregular, S1/S2, no murmurs, rubs, or gallops, radial pulses +2  Pulmonary: CTAB, no respiratory distress, no wheezing, rales or rhonchi, on RA  Abdomen: +BS, soft, non-tender, nondistended, no guarding rigidity or rebound tenderness, no masses noted  MSK: Nonpitting edema bilaterally, No joint swelling, normal movements of all extremities.   Neuro: Still no purposeful verbal cues, following commands, opens eyes to commands, moving extremities to command, withdraws and localizes pain  Skin- No lesions, contusions, or erythema.        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     Cardiac: Mixed Septic Shock 2/2 UTI with Distributive shock 2/2 Adrenal insufficiency, Elevated troponin without ST depressions or elevations likely demand ischemia  - History: HFmREF (EF 40-45% 5/2024), bradycardia 2/2 sinus node dysfunction s/p pacemaker (5/17/2024), CAD s/p CABG 2013, A-fib (previously on Eliquis but off due to thrombocytopenia)  - Goals:  [MAP> 65, HR ]  - Home meds: Atorvastatin, metoprolol  - Last echo: 40-45% 5/2024   - Pressors: Levophed off since 9PM 10/1  - Holding hypertension medications      Pulmonary: No active concerns  -History: COPD  -Home meds: Albuterol  Continuous pulse oximetry   O2 PRN to maintain SpO2 > 94%, wean as tolerated  -Imaging: CXR negative     Gastrointestinal: No active concerns  -History: GERD  -Home meds: Pantoprazole  - Diet: NPO with enteral feeding, Tube feeds started today  -  Supplementation: None  - Prophylaxis: protonix  - Bowel regimen: MiraLAX  - Last BM: Last BM Date: 24  -Remove NG tube once mentation improves and feels safe to swallow to initiate PO diet       Renal: Mild Hypernatremia resolved, Lactic acidosis, resolved   - Daily RFP,Mg,Phos  - History: CKD 3 (BL CR 1.5-1.9)  - Home meds: Lasix  Net IO Since Admission: 2,418.18 mL [10/02/24 1415]  Results from last 72 hours   Lab Units 10/02/24  0459 10/01/24  03324  0944   BUN mg/dL 32* 33* 34*   CREATININE mg/dL 2.01* 1.97* 1.87*       - Fluids: Hold  - Electrolytes: Replete per protocol, goal K>4 Phos >3 Mg >2    Endocrine: Adrenal insufficiency likely secondary to infection  -History: recurrent adrenal insufficiency (hydrocortisone), 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   -Home meds:  Lantus 10 units in a.m., sliding scale, Cortef 20 mg in a.m./20 mg p.m., flu cortisone 0.1 mg every other day   -sliding scale: Holding insulin in setting of hypoglycemia  Results from last 7 days   Lab Units 10/02/24  0742 10/02/24  0459 10/01/24  2359 10/01/24  2001 10/01/24  1247 10/01/24  0904 10/01/24  0350 10/01/24  0339   POCT GLUCOSE mg/dL 129*  --  150* 152* 159* 171* 185*  --    GLUCOSE mg/dL  --  145*  --   --   --   --   --  201*      -BG < 180 goal  -Hypoglycemia protocol  -Solu-Cortef 50 every 6  -Hold mycophenolate in setting of infection    Hematology: No active concerns  - Daily CBC, coags  -History: Pancytopenia, thrombocytopenia   -Home meds: Heparin  Results from last 7 days   Lab Units 10/02/24  0459 10/01/24  0339 09/30/24  0944   WBC AUTO x10*3/uL 7.7 11.8* 5.8   HEMOGLOBIN g/dL 6.8* 7.6* 7.9*   HEMATOCRIT % 23.0* 25.3* 26.8*   PLATELETS AUTO x10*3/uL 76* 91* 64*     - DVT Prophylaxis: heparin    Infectious Disease: Septic shock 2/2 UTI  -History: Multiple cellulitis/UTI  -Home meds: None  Temp (24hrs), Av.6 °C (96.1 °F), Min:34.8 °C (94.6 °F), Max:36.1 °C (97 °F)     -ID consulted, recs  appreciated   -Abx: Rocephin (9/30-), Vancomycin (10/1-)  -Cultures:               Blood culture (9/30): Staph epidermidis, staph pettenkoferi, staph aureus              Urine culture (9/30): Pending   MRSA PCR: + MSSA      LDA:  Arterial Line 09/30/24 Right Radial (Active)   Placement Date/Time: 09/30/24 2036   Orientation: Right  Location: Radial   Number of days: 0       Urethral Catheter Double-lumen;Temperature probe 16 Fr. (Active)   Placement Date/Time: 09/30/24 1620   Placed by: Che Herrera RN  Hand Hygiene Completed: Yes  Catheter Type: Double-lumen;Temperature probe  Tube Size (Fr.): 16 Fr.  Catheter Balloon Size: 10 mL  Urine Returned: Yes   Number of days: 0         CODE STATUS: DNR and No Intubation    Disposition: Off of vasopressor therapy and stable for downgrade to the floor    ABCDEF Checklist  Analgesia: Spontaneous awakening trial to be pursued if clinically appropriate. RASS goal reviewed  Breathing: Spontaneous breathing trial to be pursued if clinically appropriate. Mechanical power of assisted ventilation reviewed  Choice of analgesia/sedation: Analgesic and sedative agents adjusted per clinical context.   Delirium assessed by CAM, will avoid exacerbating factors  Early mobility and exercise: Physical and occupational therapy engaged  Family: Plan of care, overall trajectory of patient shared with family. Questions elicited and answered as appropriate.       Due to the high probability of life threatening clinical decompensation, the patient required critical care time evaluating and managing this patient.  Critical care time included obtaining a history, examining the patient, ordering and reviewing studies, discussing, developing, and implementing a management plan, evaluating the patient's response to treatment, and discussion with other care team providers. I saw and evaluated the patient myself.  Critical care time was performed exclusive of billable procedures.    RESTRAINTS: type  -   - None      Critical care time: 60 minutes      Case discussed with attending , Dr. Bob Silverio, DO  Internal Medicine, PGY-2

## 2024-10-02 NOTE — PROGRESS NOTES
Vancomycin Dosing by Pharmacy- Cessation of Therapy    Consult to pharmacy for vancomycin dosing has been discontinued by the prescriber, pharmacy will sign off at this time.    Please call pharmacy if there are further questions or re-enter a consult if vancomycin is resumed.     Kiara Larsen, PharmD

## 2024-10-02 NOTE — SIGNIFICANT EVENT
Bing Holliday is a 63-year-old female admitted to the ICU for septic shock requiring vasopressors in the setting of adrenal insufficiency.  She has a past medical history of lupus complicated by cerebritis (on CellCept and prednisone) and nephritis, CKD 3, adrenal insufficiency, HFpEF (EF 40-45% on 5/2024), sinus node dysfunction s/p pacemaker (5/17/2024), CAD (s/p CABG 2013), COPD, GERD, and Afib (switched from Eliquis to Heparin by her SNF due to thrombocytopenia).  She was transferred to regular floor step-down unit given stable BP off of vasopressors.      Patient presented to the ED from her SNF on 9/30 for AMS.  At baseline her mentation fluctuates and at her best she is oriented to self, place, and time, and can respond fully to questioning.  BP was soft in the ED but vitals were otherwise wnl.  She was hypoglycemic on presentation with glucose of 43 and was given D5 LR with repeat glucose 198 and no further episodes of hypoglycemia.  Of note she had been experiencing lower glucose levels (70s-80s) in the days leading up to her admission. Also found to have a UTI with blood culture growing enteric bacilli.      She was admitted to the ICU from the ED for septic shock refractory to 2L of fluids and started on pressors.  Septic shock was secondary to UTI and complicated by adrenal insufficiency.  Lactate was elevated on admission and subsequently normalized 3.2->3.1->2.0.  She was weaned off vasopressors on 10/1 at 9pm and blood pressures have since been soft but stable at 90s-100s/60s.  She was given stress-dose Solu-Cortef for shock in the setting of adrenal insufficiency.  Her UTI is being treated with Rocephin with discontinuation of Vancomycin due to negative MRSA screen.  Her hemoglobin dropped from 7.6 to 6.8 on 10/2 without evidence of bleeding, and she was given 1 unit pRBC.  ID is on board for one positive blood culture growing various staph species with repeat cultures in process to confirm  contamination vs true bacteremia.  Troponins were trending up at 81->91>113 likely due to demand ischemia.  An NG tube was placed on 10/2 for administration of home PO meds.  She was accepted for step-down to regular floor on 10/2.        Physical Examination:   General: patient resting comfortably, opens eyes to voice, moans in response to cold touch, not responding to command  Head: normocephalic, atraumatic  Eyes: pupils equally responsive to light  Cardio: regular rate, irregular rhythm, no murmurs, 2+ radial pulses bilaterally   Pulmonary: lungs CTAB, mild expiratory wheezing, no rales or rhonchi, not in respiratory distress, no use of accessory muscles of breathing   Abdomen: bowel sounds present, abdomen non-tender, non-distended, no guarding  MSK: trace edema of bilateral lower extremities  Neuro: opens eyes to voice and command, unable to follow commands, able to localize and withdraw from painful stimuli, is A&Ox1 oriented to self, responds occasionally to questioning with yes or no  Skin: no rashes or lesions       Assessment and Plan:    Bing Holliday is a 63-year-old female transferred from ICU to step-down unit for further management of acute metabolic encephalopathy secondary to UTI and complicated by adrenal insufficiency.      #Acute metabolic encephalopathy 2/2 UTI and c/b adrenal insufficiency -improving  #Lupus cerebritis  - Patient treated for septic shock in the ICU with pressors and Vancomycin (now discontinued) and Rocephin for UTI.  Weaned off of pressors on 10/1 at 9pm with BP soft but stable (90-100s/60s).  Shock was complicated by adrenal insufficiency that was likely worsening in the setting of UTI.  Patient's shock has resolved and AMS is improving on stress-dose steroids, Rocephin.    - She is normally on insulin at home (Lantus 10 units AM and sliding scale)  - Lupus cerebritis has historically been controlled on CellCept and Cortef 20 mg AM/20 mg PM, fludrocortisone 0.1 mg every  other day,   - Patient had one positive blood culture growing Staph aureus/epidermidis/pettenkoferi.  Likely a contaminant; repeat cultures pending     Plan:   - Continue IV Rocephin (09/30-)  - Continue Solu-Cortef 50 mg IV q6H and wean back to baseline cortef dose as mental status and vitals continue to improve   - Hold home CellCept in the setting of infection   - Continue to hold home BP medications due to soft pressures   - Follow up repeat blood cultures and ID recs   - NG tube for home meds.  Can remove once mentation improves and patient passes bedside swallow eval     #Acute on Chronic Anemia  #Anemia of Chronic Disease  #Chronic Pancytopenia  - Hemoglobin dropped from 7.6 to 6.8 on 10/2.  Patient receiving 1 unit PRBC.  No signs of bleeding on exam.    -Pancytopenia at baseline, but slightly worse in setting of septic shock  Plan:   - Continue to trend with daily CBC    #Hypoglycemia? 2/2 poor oral intake  #Uncontrolled IDDM2  -Hx of labile sugars; insulin held due to hypoglycemia concerns with poor oral intake requiring NG tube  Plan:  -Accuchecks    #JED on CKD 3  - Cr 2.01 (baseline 1.4 -1.9).  Etiology could be prerenal secondary to decreased perfusion from septic shock requiring pressors vs intrinsic renal from UTI or previous vancomycin.      Plan:   - Trend Cr with daily RFP      Chronic problems:   #Afib (on Heparin due to thrombocytopenia from Eliquis)  #CKD3  #HFpEF  #COPD  #GERD  #CAD  - continue home medications as appropriate (atorvastatin 40 mg, Keppra 500 mgm BID, Risperidone 0.25 mg, Zoloft 25 mg)      CODE STATUS: DNR DNI        Tsering Mcclendon S-4  10/2/2024    Senior Addendum  I saw and evaluated patient independently of the med student, oversaw all aspects of the work-up     Steve Martinez DO  PGY-3 Internal Medicine

## 2024-10-02 NOTE — PROGRESS NOTES
INPATIENT PROGRESS NOTES    PATIENT NAME: Bing Holliday    MRN: [id number]  SERVICE DATE:  10/2/2024   SERVICE TIME:  10:19 AM    SUBJECTIVE  Patient had an episode of tachypnea but has otherwise been HDS on RA. Pressors were weaned off, and the patient has begun verbalizing. She is still lethargic, but does respond to verbal stimuli.    OBJECTIVE  PHYSICAL EXAM:   Patient Vitals for the past 24 hrs:   BP Temp Temp src Pulse Resp SpO2   10/02/24 0900 -- -- -- 100 (!) 28 --   10/02/24 0857 -- 35.6 °C (96.1 °F) Temporal -- -- --   10/02/24 0800 -- -- -- 86 24 --   10/02/24 0750 -- -- -- 80 20 96 %   10/02/24 0744 -- 34.8 °C (94.6 °F) Temporal -- -- --   10/02/24 0700 -- -- -- 72 12 97 %   10/02/24 0600 -- -- -- 70 11 96 %   10/02/24 0500 -- -- -- 68 10 94 %   10/02/24 0400 -- 35.3 °C (95.5 °F) Temporal 68 11 94 %   10/02/24 0300 -- -- -- 72 12 99 %   10/02/24 0200 -- -- -- 68 12 100 %   10/02/24 0100 -- -- -- 78 14 100 %   10/02/24 0000 -- 36.1 °C (97 °F) Temporal 74 18 97 %   10/01/24 2300 -- -- -- 66 21 99 %   10/01/24 2200 -- -- -- 70 21 99 %   10/01/24 2100 -- -- -- 70 22 99 %   10/01/24 2000 -- 36.1 °C (97 °F) Temporal 74 23 98 %   10/01/24 1939 110/68 -- Temporal 71 22 --   10/01/24 1900 -- -- -- 74 25 99 %   10/01/24 1800 -- -- -- 74 20 97 %   10/01/24 1700 -- -- -- 82 25 97 %   10/01/24 1600 -- -- -- 80 23 96 %   10/01/24 1500 -- -- -- 90 (!) 28 97 %   10/01/24 1400 -- -- -- 94 18 97 %   10/01/24 1300 -- -- -- 100 17 99 %   10/01/24 1245 -- -- -- 108 (!) 31 99 %   10/01/24 1230 -- -- -- 100 21 100 %   10/01/24 1215 -- -- -- 104 24 98 %   10/01/24 1200 -- 36.1 °C (97 °F) Temporal 106 21 98 %   10/01/24 1145 -- -- -- (!) 112 (!) 28 98 %   10/01/24 1130 -- -- -- 110 (!) 28 98 %   10/01/24 1115 -- -- -- (!) 114 24 98 %   10/01/24 1100 -- -- -- (!) 112 (!) 30 98 %   10/01/24 1045 -- -- -- (!) 114 (!) 27 96 %   10/01/24 1030 -- -- -- 110 (!) 32 98 %     Gen: NAD  Neck: symmetric, no mass  Cardiovascular: Irregular  rhythm but regular rate  Respiratory: No distress, CTAB  GI: Abd soft, nontender, non-distended, BS+  Extremities: Bilateral nonpitting edema of the bilateral lower extremities  Skin: Warm and dry.  Neuro: alert and oriented times 1 (to self), lethargic    Labs:  Lab Results   Component Value Date    WBC 7.7 10/02/2024    HGB 6.8 (L) 10/02/2024    HCT 23.0 (L) 10/02/2024    MCV 96 10/02/2024    PLT 76 (L) 10/02/2024     Lab Results   Component Value Date    GLUCOSE 145 (H) 10/02/2024    CALCIUM 8.6 10/02/2024     10/02/2024    K 3.8 10/02/2024    CO2 23 10/02/2024     (H) 10/02/2024    BUN 32 (H) 10/02/2024    CREATININE 2.01 (H) 10/02/2024   ESR: --  Lab Results   Component Value Date    SEDRATE 2 07/22/2024     Lab Results   Component Value Date    CRP 2.57 (H) 07/22/2024     Lab Results   Component Value Date    ALT 8 09/30/2024    AST 13 09/30/2024    GGT 63 (H) 01/07/2021    ALKPHOS 108 09/30/2024    BILITOT 0.6 09/30/2024     DATA:   Diagnostic tests reviewed for today's visit:    Labs this admission reviewed  Imagings this admission reviewed  Cultures: Reviewed    ASSESSMENT :   # Septic shock due to UTI  # Gram positive bacteremia  # Leukocytosis - resolved  # Lactic acidosis - resolved  # Troponinemia  # 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 Repeat cultures pending.  - UA showing leukocyte esterase, nitrites, > 50 WBCs, and 1+ bacteria. Culture growing > 100,000 enteric bacilli, speciation and sensitivity pending  - MRSA screen growing MSSA  - Continue Rocephin, discontinue Vancomycin  - Closely monitor for antibiotics side effects including rash,  "Diarrhea/CDI, thrombocytopenia, JED, etc.  - Defer to primary regarding management of other comorbid conditions     Will continue to follow    The assessment and plan were discussed with the attending physician,  Cheko Angel, DO  Internal Medicine PGY-2    This note was partially created using voice recognition software and is inherently subject to errors including those of syntax and \"sound-alike\" substitutions which may escape proofreading. In such instances, original meaning may be extrapolated by contextual derivation   "

## 2024-10-02 NOTE — PROGRESS NOTES
10/02/24 1430   Discharge Planning   Living Arrangements Other (Comment)  (@ [facility])   Type of Residence Nursing home/residential care   Home or Post Acute Services Post acute facilities (Rehab/SNF/etc)   Type of Post Acute Facility Services Long term care;Skilled nursing   Expected Discharge Disposition Inter   Does the patient need discharge transport arranged? Yes   RoundTrip coordination needed? Yes   Patient Choice   Patient / Family choosing to utilize agency / facility established prior to hospitalization Yes  (Return to [facility])     Pt in the ICU, no family present at this time. Pt lethargic, oriented x 0-1. TC to son López who confirms the pt admitted from [facility] where she was currently LTC. If the pt is able to be skillable again he would like to pursue that. DSC tasked to send return referral.

## 2024-10-03 ENCOUNTER — APPOINTMENT (OUTPATIENT)
Dept: RADIOLOGY | Facility: HOSPITAL | Age: 64
End: 2024-10-03
Payer: MEDICARE

## 2024-10-03 ENCOUNTER — APPOINTMENT (OUTPATIENT)
Dept: HEMATOLOGY/ONCOLOGY | Facility: CLINIC | Age: 64
End: 2024-10-03
Payer: MEDICARE

## 2024-10-03 LAB
ALBUMIN SERPL BCP-MCNC: 2.9 G/DL (ref 3.4–5)
ANION GAP SERPL CALC-SCNC: 18 MMOL/L (ref 10–20)
BACTERIA UR CULT: ABNORMAL
BUN SERPL-MCNC: 36 MG/DL (ref 6–23)
CALCIUM SERPL-MCNC: 8.9 MG/DL (ref 8.6–10.3)
CHLORIDE SERPL-SCNC: 110 MMOL/L (ref 98–107)
CO2 SERPL-SCNC: 22 MMOL/L (ref 21–32)
CREAT SERPL-MCNC: 2.34 MG/DL (ref 0.5–1.05)
EGFRCR SERPLBLD CKD-EPI 2021: 23 ML/MIN/1.73M*2
ERYTHROCYTE [DISTWIDTH] IN BLOOD BY AUTOMATED COUNT: 22.5 % (ref 11.5–14.5)
GLUCOSE BLD MANUAL STRIP-MCNC: 157 MG/DL (ref 74–99)
GLUCOSE BLD MANUAL STRIP-MCNC: 173 MG/DL (ref 74–99)
GLUCOSE SERPL-MCNC: 155 MG/DL (ref 74–99)
HCT VFR BLD AUTO: 29.9 % (ref 36–46)
HGB BLD-MCNC: 9.2 G/DL (ref 12–16)
MAGNESIUM SERPL-MCNC: 2.65 MG/DL (ref 1.6–2.4)
MCH RBC QN AUTO: 29.1 PG (ref 26–34)
MCHC RBC AUTO-ENTMCNC: 30.8 G/DL (ref 32–36)
MCV RBC AUTO: 95 FL (ref 80–100)
NRBC BLD-RTO: 2.2 /100 WBCS (ref 0–0)
PHOSPHATE SERPL-MCNC: 4.9 MG/DL (ref 2.5–4.9)
PLATELET # BLD AUTO: 83 X10*3/UL (ref 150–450)
POTASSIUM SERPL-SCNC: 4.5 MMOL/L (ref 3.5–5.3)
RBC # BLD AUTO: 3.16 X10*6/UL (ref 4–5.2)
S PNEUM AG UR QL: NEGATIVE
SODIUM SERPL-SCNC: 145 MMOL/L (ref 136–145)
WBC # BLD AUTO: 8.5 X10*3/UL (ref 4.4–11.3)

## 2024-10-03 PROCEDURE — 80069 RENAL FUNCTION PANEL: CPT

## 2024-10-03 PROCEDURE — 74018 RADEX ABDOMEN 1 VIEW: CPT | Performed by: RADIOLOGY

## 2024-10-03 PROCEDURE — 85027 COMPLETE CBC AUTOMATED: CPT

## 2024-10-03 PROCEDURE — 76770 US EXAM ABDO BACK WALL COMP: CPT

## 2024-10-03 PROCEDURE — 2500000001 HC RX 250 WO HCPCS SELF ADMINISTERED DRUGS (ALT 637 FOR MEDICARE OP)

## 2024-10-03 PROCEDURE — 74018 RADEX ABDOMEN 1 VIEW: CPT

## 2024-10-03 PROCEDURE — 2060000001 HC INTERMEDIATE ICU ROOM DAILY

## 2024-10-03 PROCEDURE — 2500000004 HC RX 250 GENERAL PHARMACY W/ HCPCS (ALT 636 FOR OP/ED)

## 2024-10-03 PROCEDURE — 76770 US EXAM ABDO BACK WALL COMP: CPT | Performed by: STUDENT IN AN ORGANIZED HEALTH CARE EDUCATION/TRAINING PROGRAM

## 2024-10-03 PROCEDURE — 2500000002 HC RX 250 W HCPCS SELF ADMINISTERED DRUGS (ALT 637 FOR MEDICARE OP, ALT 636 FOR OP/ED)

## 2024-10-03 PROCEDURE — 99233 SBSQ HOSP IP/OBS HIGH 50: CPT

## 2024-10-03 PROCEDURE — 36415 COLL VENOUS BLD VENIPUNCTURE: CPT

## 2024-10-03 PROCEDURE — 83735 ASSAY OF MAGNESIUM: CPT

## 2024-10-03 PROCEDURE — 82947 ASSAY GLUCOSE BLOOD QUANT: CPT

## 2024-10-03 RX ORDER — HYDROCORTISONE 20 MG/1
20 TABLET ORAL EVERY MORNING
Status: DISCONTINUED | OUTPATIENT
Start: 2024-10-03 | End: 2024-10-03

## 2024-10-03 RX ORDER — ERTAPENEM 1 G/1
1 INJECTION, POWDER, LYOPHILIZED, FOR SOLUTION INTRAMUSCULAR; INTRAVENOUS EVERY 24 HOURS
Status: DISCONTINUED | OUTPATIENT
Start: 2024-10-03 | End: 2024-10-04

## 2024-10-03 RX ORDER — SODIUM CHLORIDE 450 MG/100ML
100 INJECTION, SOLUTION INTRAVENOUS CONTINUOUS
Status: DISCONTINUED | OUTPATIENT
Start: 2024-10-03 | End: 2024-10-03

## 2024-10-03 RX ORDER — RISPERIDONE 0.5 MG/1
1 TABLET ORAL 2 TIMES DAILY
Status: DISCONTINUED | OUTPATIENT
Start: 2024-10-03 | End: 2024-10-03

## 2024-10-03 RX ORDER — LEVETIRACETAM 100 MG/ML
500 SOLUTION ORAL 2 TIMES DAILY
Status: DISPENSED | OUTPATIENT
Start: 2024-10-03

## 2024-10-03 RX ORDER — SERTRALINE HYDROCHLORIDE 50 MG/1
25 TABLET, FILM COATED ORAL DAILY
Status: DISPENSED | OUTPATIENT
Start: 2024-10-04

## 2024-10-03 RX ORDER — LEVETIRACETAM 100 MG/ML
500 SOLUTION ORAL 2 TIMES DAILY
Status: DISCONTINUED | OUTPATIENT
Start: 2024-10-03 | End: 2024-10-03

## 2024-10-03 RX ORDER — RISPERIDONE 0.5 MG/1
1 TABLET ORAL 2 TIMES DAILY
Status: DISPENSED | OUTPATIENT
Start: 2024-10-03

## 2024-10-03 RX ORDER — HYDROCORTISONE 20 MG/1
20 TABLET ORAL EVERY MORNING
Status: DISCONTINUED | OUTPATIENT
Start: 2024-10-04 | End: 2024-10-04

## 2024-10-03 RX ORDER — HYDROCORTISONE 5 MG/1
10 TABLET ORAL EVERY EVENING
Status: DISCONTINUED | OUTPATIENT
Start: 2024-10-03 | End: 2024-10-04

## 2024-10-03 RX ORDER — HYDROCORTISONE 5 MG/1
10 TABLET ORAL EVERY EVENING
Status: DISCONTINUED | OUTPATIENT
Start: 2024-10-03 | End: 2024-10-03

## 2024-10-03 ASSESSMENT — PAIN SCALES - PAIN ASSESSMENT IN ADVANCED DEMENTIA (PAINAD)
BODYLANGUAGE: RELAXED
FACIALEXPRESSION: SAD, FRIGHTENED, FROWN
BODYLANGUAGE: RELAXED
CONSOLABILITY: DISTRACTED OR REASSURED BY VOICE/TOUCH
TOTALSCORE: 3
BREATHING: NORMAL
FACIALEXPRESSION: SMILING OR INEXPRESSIVE
BREATHING: NORMAL
NEGVOCALIZATION: OCCASIONAL MOAN/GROAN, LOW SPEECH, NEGATIVE/DISAPPROVING QUALITY
TOTALSCORE: 2
CONSOLABILITY: DISTRACTED OR REASSURED BY VOICE/TOUCH
NEGVOCALIZATION: OCCASIONAL MOAN/GROAN, LOW SPEECH, NEGATIVE/DISAPPROVING QUALITY

## 2024-10-03 ASSESSMENT — ENCOUNTER SYMPTOMS
MYALGIAS: 0
CHILLS: 0
VOMITING: 0
ACTIVITY CHANGE: 1
COUGH: 0
SHORTNESS OF BREATH: 0
APPETITE CHANGE: 1
FATIGUE: 1
CONSTIPATION: 0
DIAPHORESIS: 0
JOINT SWELLING: 0
DIARRHEA: 0
FEVER: 0
CONFUSION: 0
NAUSEA: 0

## 2024-10-03 ASSESSMENT — PAIN - FUNCTIONAL ASSESSMENT
PAIN_FUNCTIONAL_ASSESSMENT: CPOT (CRITICAL CARE PAIN OBSERVATION TOOL)
PAIN_FUNCTIONAL_ASSESSMENT: CPOT (CRITICAL CARE PAIN OBSERVATION TOOL)
PAIN_FUNCTIONAL_ASSESSMENT: 0-10
PAIN_FUNCTIONAL_ASSESSMENT: PAINAD (PAIN ASSESSMENT IN ADVANCED DEMENTIA SCALE)
PAIN_FUNCTIONAL_ASSESSMENT: PAINAD (PAIN ASSESSMENT IN ADVANCED DEMENTIA SCALE)

## 2024-10-03 ASSESSMENT — PAIN SCALES - GENERAL
PAINLEVEL_OUTOF10: 0 - NO PAIN
PAINLEVEL_OUTOF10: 0 - NO PAIN

## 2024-10-03 NOTE — PROGRESS NOTES
Spiritual Care Visit    Clinical Encounter Type  Visited With: Patient  Routine Visit: Introduction  Continue Visiting: No                                            Taxonomy  Intended Effects: Promote sense of peace, Preserve dignity and respect, Meaning-making  Methods: Offer spiritual/Baptism support  Interventions: Share words of hope and inspiration, Cecil     spoke comforting words to the patient.  Patient made eye contact at one point.   prayed for the patient, as in previous visits the patient has always requested prayer.

## 2024-10-03 NOTE — PROGRESS NOTES
INPATIENT PROGRESS NOTES    PATIENT NAME: Bing Holliday    MRN: [id number]  SERVICE DATE:  10/3/2024   SERVICE TIME:  9:41 AM    SUBJECTIVE  HDS on RA. Mentation appears to be improving, however patient more agitated upon bedside evaluation.    OBJECTIVE  PHYSICAL EXAM:   Patient Vitals for the past 24 hrs:   BP Temp Temp src Pulse Resp SpO2 Weight   10/03/24 0900 108/75 -- -- 100 20 97 % --   10/03/24 0800 106/58 -- -- 98 20 97 % --   10/03/24 0734 -- 35.8 °C (96.4 °F) Temporal -- -- -- --   10/03/24 0700 102/66 -- -- 98 20 97 % --   10/03/24 0600 93/66 -- -- 98 26 97 % --   10/03/24 0551 -- -- -- -- -- -- 87.1 kg (192 lb 1.6 oz)   10/03/24 0500 109/75 -- -- 106 (!) 27 96 % --   10/03/24 0400 116/76 35.6 °C (96.1 °F) Temporal 94 18 96 % --   10/03/24 0300 114/75 -- -- 86 (!) 29 95 % --   10/03/24 0200 114/78 -- -- 92 19 94 % --   10/03/24 0100 105/77 -- -- 90 (!) 30 94 % --   10/03/24 0000 117/79 36 °C (96.8 °F) Temporal 108 21 100 % --   10/02/24 2300 112/81 -- -- 92 18 100 % --   10/02/24 2200 101/81 -- -- 92 21 99 % --   10/02/24 2100 114/81 -- -- 94 18 98 % --   10/02/24 2000 103/73 35.6 °C (96.1 °F) Temporal 92 17 98 % --   10/02/24 1900 109/76 -- -- 98 17 98 % --   10/02/24 1800 -- -- -- 106 26 -- --   10/02/24 1700 -- -- -- 92 17 100 % --   10/02/24 1658 96/68 35.7 °C (96.3 °F) Temporal 88 20 100 % --   10/02/24 1645 -- 35.6 °C (96.1 °F) Temporal 94 20 -- --   10/02/24 1630 -- 35.4 °C (95.7 °F) Temporal 100 24 97 % --   10/02/24 1615 -- -- -- 92 15 97 % --   10/02/24 1600 -- -- -- 96 19 97 % --   10/02/24 1545 -- 35.4 °C (95.7 °F) Temporal 84 21 97 % --   10/02/24 1530 -- 35.4 °C (95.7 °F) Temporal 94 17 97 % --   10/02/24 1515 -- 35.7 °C (96.3 °F) Temporal 94 23 97 % --   10/02/24 1500 -- 35.6 °C (96.1 °F) Temporal 98 17 97 % --   10/02/24 1450 92/58 35.5 °C (95.9 °F) Temporal 101 22 98 % --   10/02/24 1445 -- -- -- 92 21 -- --   10/02/24 1430 -- -- -- 90 17 -- --   10/02/24 1415 -- -- -- 92 16 -- --    10/02/24 1400 -- -- -- 86 17 -- --   10/02/24 1345 -- -- -- 88 21 -- --   10/02/24 1330 -- -- -- 86 15 -- --   10/02/24 1300 -- -- -- 80 26 -- --   10/02/24 1200 -- 35.8 °C (96.4 °F) -- 86 13 -- --   10/02/24 1100 -- -- -- 94 (!) 30 -- --   10/02/24 1000 -- -- -- 84 17 -- --     Gen: NAD  Neck: symmetric, no mass  Cardiovascular: Irregular rhythm and tachycardic, no obvious murmurs  Respiratory: No distress, CTAB  GI: Abd soft, nontender, non-distended, BS+  Extremities: Bilateral nonpitting edema of the bilateral lower extremities  Skin: Warm and dry.  Neuro: alert and oriented times 1, agitated    Labs:  Lab Results   Component Value Date    WBC 8.5 10/03/2024    HGB 9.2 (L) 10/03/2024    HCT 29.9 (L) 10/03/2024    MCV 95 10/03/2024    PLT 83 (L) 10/03/2024     Lab Results   Component Value Date    GLUCOSE 155 (H) 10/03/2024    CALCIUM 8.9 10/03/2024     10/03/2024    K 4.5 10/03/2024    CO2 22 10/03/2024     (H) 10/03/2024    BUN 36 (H) 10/03/2024    CREATININE 2.34 (H) 10/03/2024   ESR: --  Lab Results   Component Value Date    SEDRATE 2 07/22/2024     Lab Results   Component Value Date    CRP 2.57 (H) 07/22/2024     Lab Results   Component Value Date    ALT 8 09/30/2024    AST 13 09/30/2024    GGT 63 (H) 01/07/2021    ALKPHOS 108 09/30/2024    BILITOT 0.6 09/30/2024     DATA:   Diagnostic tests reviewed for today's visit:    Labs this admission reviewed  Imagings this admission reviewed  Cultures: Reviewed    ASSESSMENT :   # Septic shock due to UTI  # Gram positive bacteremia - contaminant?  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 Repeat cultures NGTD.  - UA showing leukocyte esterase, nitrites, > 50 WBCs, and 1+ bacteria. Culture growing > 100,000 ESBL klebsiella  - MRSA screen growing MSSA  - Switch from Rocephin to Ertapenem  - Closely monitor for antibiotics side effects including rash, Diarrhea/CDI, thrombocytopenia, JED, etc.  - Defer to primary regarding management of other comorbid conditions     Will continue to follow.    The assessment and plan were discussed with the attending physician,  Cheko Angel, DO  Internal Medicine PGY-2    This note was partially created using voice recognition software and is inherently subject to errors including those of syntax and \"sound-alike\" substitutions which may escape proofreading. In such instances, original meaning may be extrapolated by contextual derivation   "

## 2024-10-03 NOTE — PROGRESS NOTES
"Critical Care Daily Progress        Subjective   Patient is a 63 y.o. female admitted on 9/30/2024  9:01 AM with the following indication(s) for ICU care Septic shock refractory to IVF requiring vasopressor therapy in setting of adrenal insufficiency.  Now off of vasopressor therapy and mentation improving.     Overnight Events: BP stable overnight. Pt pulled out NG tube, was difficult to take meds with applesauce - some mild cough with swallowing.    Complaints: States that she feels like \"garbage\" but otherwise denies chest pain, abdominal pain, sob, or headache. Does endorse \"stinging\" with urination. She states she is hungry and would like breakfast.    Scheduled Medications:   atorvastatin, 40 mg, nasogastric tube, Nightly  [Held by provider] budesonide, 0.5 mg, nebulization, BID  cefTRIAXone, 2 g, intravenous, q24h  influenza, 0.5 mL, intramuscular, During hospitalization  [Held by provider] furosemide, 40 mg, oral, Daily  heparin (porcine), 5,000 Units, subcutaneous, q8h  hydrocortisone sodium succinate, 50 mg, intravenous, q6h  levETIRAcetam, 500 mg, oral, BID  [Held by provider] metoprolol tartrate, 25 mg, oral, BID  pantoprazole, 40 mg, intravenous, Daily before breakfast  [Held by provider] polyethylene glycol, 17 g, oral, Daily  risperiDONE, 0.25 mg, oral, Daily  sertraline, 25 mg, oral, Daily         Continuous Medications:   sodium chloride, 100 mL/hr, Last Rate: 100 mL/hr (10/03/24 0623)         PRN Medications:   PRN medications: calcium gluconate, calcium gluconate, dextrose, dextrose, glucagon, glucagon, [Held by provider] ipratropium-albuteroL, magnesium sulfate, magnesium sulfate, potassium chloride CR **OR** potassium chloride, potassium chloride CR **OR** potassium chloride, potassium chloride    Objective   Vitals:  Temp  Min: 35.4 °C (95.7 °F)  Max: 36 °C (96.8 °F)  Pulse  Min: 80  Max: 108  BP  Min: 92/58  Max: 117/79  Resp  Min: 13  Max: 30  SpO2  Min: 94 %  Max: 100 %    Physical Exam: "   Physical Exam   Physical Exam  Constitutional: A&Ox1, appears comfortable. Opening eyes to command, no longer moaning. Talking and communicating verbally this morning.  Head and Face: Atraumatic, normocephalic   Eyes: Pupils dilate appropriately to light  ENT: Dry mucous membranes, trachea midline  Cardiovascular: irregularly irregular, S1/S2, no murmurs, rubs, or gallops, radial pulses +2  Pulmonary: CTAB, no respiratory distress, no wheezing, rales or rhonchi, on RA  Abdomen: +BS, soft, non-tender, nondistended, no guarding rigidity or rebound tenderness, no masses noted  MSK: Nonpitting edema bilaterally, No joint swelling, normal movements of all extremities.   Neuro: A&Ox1, verbally responding, mentation improving, following commands, normal movement in all extremities, swallows, CNII-XII intact  Skin- No lesions, contusions, or erythema.        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     Cardiac: Mixed Septic Shock 2/2 UTI with Distributive shock 2/2 Adrenal insufficiency, Elevated troponin without ST depressions or elevations likely demand ischemia  - History: HFmREF (EF 40-45% 5/2024), bradycardia 2/2 sinus node dysfunction s/p pacemaker (5/17/2024), CAD s/p CABG 2013, A-fib (previously on Eliquis but off due to thrombocytopenia)  - Goals:  [MAP> 65, HR ]  - Home meds: Atorvastatin, metoprolol  - Last echo: 40-45% 5/2024   - Pressors: Levophed off since 9PM 10/1  - Holding hypertension medications      Pulmonary: No active concerns  -History: COPD  -Home meds: Albuterol  Continuous pulse oximetry   O2 PRN to maintain SpO2 > 94%, wean as  tolerated  -Imaging: CXR negative     Gastrointestinal: Malnutrition   -History: GERD  -Home meds: Pantoprazole  - Diet: Trial PO diet, soft and purees  - Supplementation: None  - Prophylaxis: protonix  - Bowel regimen: MiraLAX  - Last BM: Last BM Date: 09/30/24  - Pulled out NG tube last night, may need PEG tube in future given frequent hospitalization and self-removals of NG tubes so that nutritional status can be continuous during sepsis and adrenal insufficiencies.. this has been discussed with family during past admissions as well       Renal: Mild Hypernatremia resolved, Lactic acidosis, resolved   - Daily RFP,Mg,Phos  - History: CKD 3 (BL CR 1.5-1.9)  - Home meds: Lasix  Net IO Since Admission: 3,014.8 mL [10/03/24 0918]  Results from last 72 hours   Lab Units 10/03/24  0331 10/02/24  0459 10/01/24  0339 09/30/24  0944   BUN mg/dL 36* 32* 33* 34*   CREATININE mg/dL 2.34* 2.01* 1.97* 1.87*       - Fluids: Hold  - Electrolytes: Replete per protocol, goal K>4 Phos >3 Mg >2    Endocrine: Adrenal insufficiency likely secondary to infection, malnutrition   -History: recurrent adrenal insufficiency (hydrocortisone), 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   -Home meds:  Lantus 10 units in a.m., sliding scale, Cortef 20 mg in a.m./20 mg p.m., flu cortisone 0.1 mg every other day   -sliding scale: Holding insulin in setting of hypoglycemia  Results from last 7 days   Lab Units 10/03/24  0836 10/03/24  0331 10/02/24  0742 10/02/24  0459 10/01/24  2359 10/01/24  2001 10/01/24  1247 10/01/24  0904   POCT GLUCOSE mg/dL 173*  --  129*  --  150* 152* 159* 171*   GLUCOSE mg/dL  --  155*  --  145*  --   --   --   --       -BG < 180 goal  -Hypoglycemia protocol  -Solu-Cortef 50 every 6  -Hold mycophenolate in setting of infection    Hematology: Acute on chronic anemia s/p 1 unit pRBC  - Daily CBC, coags  -History: intermittent Pancytopenia (not currently leukopenic), thrombocytopenia   -Home meds: Heparin  - no s/s  of bleeding  Results from last 7 days   Lab Units 10/03/24  0331 10/02/24  0459 10/01/24  0339 24  0944   WBC AUTO x10*3/uL 8.5 7.7 11.8* 5.8   HEMOGLOBIN g/dL 9.2* 6.8* 7.6* 7.9*   HEMATOCRIT % 29.9* 23.0* 25.3* 26.8*   PLATELETS AUTO x10*3/uL 83* 76* 91* 64*     - DVT Prophylaxis: heparin    Infectious Disease: Septic shock 2/2 UTI  -History: Multiple cellulitis/UTI  -Home meds: None  Temp (24hrs), Av.6 °C (96.1 °F), Min:35.4 °C (95.7 °F), Max:36 °C (96.8 °F)     -ID consulted, recs appreciated   -Abx: Rocephin (-), Vancomycin (10/1-10/2)  -Cultures:               Blood culture (): Staph epidermidis, staph pettenkoferi, staph aureus              Urine culture (): >100,000 Enteric Bacilli   Blood Culture (10/01): NTD   MRSA PCR: + MSSA      LDA:  Arterial Line 24 Right Radial (Active)   Placement Date/Time: 24   Orientation: Right  Location: Radial   Number of days: 0       Urethral Catheter Double-lumen;Temperature probe 16 Fr. (Active)   Placement Date/Time: 24 1620   Placed by: Che Herrera RN  Hand Hygiene Completed: Yes  Catheter Type: Double-lumen;Temperature probe  Tube Size (Fr.): 16 Fr.  Catheter Balloon Size: 10 mL  Urine Returned: Yes   Number of days: 0         CODE STATUS: DNR and No Intubation    Disposition: Downgraded to intermediate level of care, pending bed assignment.     ABCDEF Checklist  Analgesia: Spontaneous awakening trial to be pursued if clinically appropriate. RASS goal reviewed  Breathing: Spontaneous breathing trial to be pursued if clinically appropriate. Mechanical power of assisted ventilation reviewed  Choice of analgesia/sedation: Analgesic and sedative agents adjusted per clinical context.   Delirium assessed by CAM, will avoid exacerbating factors  Early mobility and exercise: Physical and occupational therapy engaged  Family: Plan of care, overall trajectory of patient shared with family. Questions elicited and answered as  appropriate.       Due to the high probability of life threatening clinical decompensation, the patient required critical care time evaluating and managing this patient.  Critical care time included obtaining a history, examining the patient, ordering and reviewing studies, discussing, developing, and implementing a management plan, evaluating the patient's response to treatment, and discussion with other care team providers. I saw and evaluated the patient myself.  Critical care time was performed exclusive of billable procedures.    RESTRAINTS: type -   - None      Critical care time: 60 minutes      Case discussed with attending , Dr. Bob Silverio, DO  Internal Medicine, PGY-2

## 2024-10-03 NOTE — CARE PLAN
Problem: Skin  Goal: Decreased wound size/increased tissue granulation at next dressing change  Outcome: Progressing  Flowsheets (Taken 10/3/2024 0458)  Decreased wound size/increased tissue granulation at next dressing change: Promote sleep for wound healing  Goal: Participates in plan/prevention/treatment measures  Outcome: Progressing  Flowsheets (Taken 10/3/2024 0458)  Participates in plan/prevention/treatment measures: Elevate heels  Goal: Prevent/manage excess moisture  Outcome: Progressing  Flowsheets (Taken 10/3/2024 0458)  Prevent/manage excess moisture: Monitor for/manage infection if present  Goal: Prevent/minimize sheer/friction injuries  Outcome: Progressing  Flowsheets (Taken 10/3/2024 0458)  Prevent/minimize sheer/friction injuries:   Use pull sheet   HOB 30 degrees or less   Turn/reposition every 2 hours/use positioning/transfer devices  Goal: Promote skin healing  Outcome: Progressing  Flowsheets (Taken 10/3/2024 0458)  Promote skin healing: Turn/reposition every 2 hours/use positioning/transfer devices    The clinical goals for the shift include Patient will remain hemodynamically stable aeb Map greater than 60 through 10/03/24 at 0700.    Patient remained safe and injury free. VSS. Patient does not void through the shift. Patient has IV fluids started. Patient pending possible replacement of NG this AM. Patient continues to progress per the plan of care.

## 2024-10-03 NOTE — NURSING NOTE
This RN receives in nursing report that patient removed NG tube. Patient receives nighttimes keppra in applesauce, but is difficult to arouse and has intermittent coughing after swallowing. CCM notified. Shannan has had intermittent agitation and has pulled out multiple NG tubes. Per CCM okay to hold reinsertion of NG tube to reassess patient mentation in the AM.

## 2024-10-04 PROBLEM — N17.9 ACUTE KIDNEY INJURY (CMS-HCC): Status: ACTIVE | Noted: 2024-10-04

## 2024-10-04 PROBLEM — G50.9 TRIGEMINAL NERVE DISORDER: Status: RESOLVED | Noted: 2024-10-04 | Resolved: 2024-10-04

## 2024-10-04 LAB
ALBUMIN SERPL BCP-MCNC: 2.9 G/DL (ref 3.4–5)
ANION GAP SERPL CALC-SCNC: 17 MMOL/L (ref 10–20)
BACTERIA BLD CULT: NORMAL
BUN SERPL-MCNC: 43 MG/DL (ref 6–23)
CALCIUM SERPL-MCNC: 8.5 MG/DL (ref 8.6–10.3)
CHLORIDE SERPL-SCNC: 109 MMOL/L (ref 98–107)
CO2 SERPL-SCNC: 21 MMOL/L (ref 21–32)
CREAT SERPL-MCNC: 2.78 MG/DL (ref 0.5–1.05)
EGFRCR SERPLBLD CKD-EPI 2021: 19 ML/MIN/1.73M*2
ERYTHROCYTE [DISTWIDTH] IN BLOOD BY AUTOMATED COUNT: 22.1 % (ref 11.5–14.5)
GLUCOSE BLD MANUAL STRIP-MCNC: 167 MG/DL (ref 74–99)
GLUCOSE BLD MANUAL STRIP-MCNC: 213 MG/DL (ref 74–99)
GLUCOSE BLD MANUAL STRIP-MCNC: 242 MG/DL (ref 74–99)
GLUCOSE BLD MANUAL STRIP-MCNC: 260 MG/DL (ref 74–99)
GLUCOSE SERPL-MCNC: 166 MG/DL (ref 74–99)
HCT VFR BLD AUTO: 29.1 % (ref 36–46)
HGB BLD-MCNC: 8.8 G/DL (ref 12–16)
MAGNESIUM SERPL-MCNC: 2.53 MG/DL (ref 1.6–2.4)
MCH RBC QN AUTO: 28.5 PG (ref 26–34)
MCHC RBC AUTO-ENTMCNC: 30.2 G/DL (ref 32–36)
MCV RBC AUTO: 94 FL (ref 80–100)
NRBC BLD-RTO: 4.2 /100 WBCS (ref 0–0)
PHOSPHATE SERPL-MCNC: 4.5 MG/DL (ref 2.5–4.9)
PLATELET # BLD AUTO: 88 X10*3/UL (ref 150–450)
POTASSIUM SERPL-SCNC: 4 MMOL/L (ref 3.5–5.3)
RBC # BLD AUTO: 3.09 X10*6/UL (ref 4–5.2)
SODIUM SERPL-SCNC: 143 MMOL/L (ref 136–145)
WBC # BLD AUTO: 8.3 X10*3/UL (ref 4.4–11.3)

## 2024-10-04 PROCEDURE — 99233 SBSQ HOSP IP/OBS HIGH 50: CPT

## 2024-10-04 PROCEDURE — 80069 RENAL FUNCTION PANEL: CPT

## 2024-10-04 PROCEDURE — 92610 EVALUATE SWALLOWING FUNCTION: CPT | Mod: GN | Performed by: SPEECH-LANGUAGE PATHOLOGIST

## 2024-10-04 PROCEDURE — 2060000001 HC INTERMEDIATE ICU ROOM DAILY

## 2024-10-04 PROCEDURE — 83735 ASSAY OF MAGNESIUM: CPT

## 2024-10-04 PROCEDURE — 2500000001 HC RX 250 WO HCPCS SELF ADMINISTERED DRUGS (ALT 637 FOR MEDICARE OP)

## 2024-10-04 PROCEDURE — 2500000005 HC RX 250 GENERAL PHARMACY W/O HCPCS

## 2024-10-04 PROCEDURE — 2500000004 HC RX 250 GENERAL PHARMACY W/ HCPCS (ALT 636 FOR OP/ED): Performed by: STUDENT IN AN ORGANIZED HEALTH CARE EDUCATION/TRAINING PROGRAM

## 2024-10-04 PROCEDURE — 2500000002 HC RX 250 W HCPCS SELF ADMINISTERED DRUGS (ALT 637 FOR MEDICARE OP, ALT 636 FOR OP/ED)

## 2024-10-04 PROCEDURE — 2500000004 HC RX 250 GENERAL PHARMACY W/ HCPCS (ALT 636 FOR OP/ED)

## 2024-10-04 PROCEDURE — 85027 COMPLETE CBC AUTOMATED: CPT

## 2024-10-04 PROCEDURE — 82947 ASSAY GLUCOSE BLOOD QUANT: CPT

## 2024-10-04 PROCEDURE — 36415 COLL VENOUS BLD VENIPUNCTURE: CPT

## 2024-10-04 RX ORDER — SODIUM CHLORIDE 9 MG/ML
INJECTION, SOLUTION INTRAMUSCULAR; INTRAVENOUS; SUBCUTANEOUS
Status: COMPLETED
Start: 2024-10-04 | End: 2024-10-04

## 2024-10-04 RX ORDER — METOPROLOL TARTRATE 25 MG/1
25 TABLET, FILM COATED ORAL 2 TIMES DAILY
Status: DISPENSED | OUTPATIENT
Start: 2024-10-05

## 2024-10-04 ASSESSMENT — PAIN SCALES - PAIN ASSESSMENT IN ADVANCED DEMENTIA (PAINAD)
FACIALEXPRESSION: OCCASIONAL MOAN/GROAN, LOW SPEECH, NEGATIVE/DISAPPROVING QUALITY
BODYLANGUAGE: TENSE, DISTRESSED PACING, FIDGETING
TOTALSCORE: 4
BREATHING: SMILING OR INEXPRESSIVE
TOTALSCORE: DISTRACTED OR REASSURED BY VOICE/TOUCH
BREATHING: OCCASIONAL LABORED BREATHING, SHORT PERIOD OF HYPERVENTILATION

## 2024-10-04 ASSESSMENT — PAIN - FUNCTIONAL ASSESSMENT
PAIN_FUNCTIONAL_ASSESSMENT: WONG-BAKER FACES
PAIN_FUNCTIONAL_ASSESSMENT: 0-10
PAIN_FUNCTIONAL_ASSESSMENT: WONG-BAKER FACES
PAIN_FUNCTIONAL_ASSESSMENT: PAINAD (PAIN ASSESSMENT IN ADVANCED DEMENTIA SCALE)

## 2024-10-04 ASSESSMENT — PAIN SCALES - GENERAL: PAINLEVEL_OUTOF10: 0 - NO PAIN

## 2024-10-04 ASSESSMENT — PAIN SCALES - WONG BAKER
WONGBAKER_NUMERICALRESPONSE: NO HURT
WONGBAKER_NUMERICALRESPONSE: HURTS LITTLE MORE

## 2024-10-04 NOTE — HOSPITAL COURSE
Bing Holliday is a 63-year-old female with a past medical history of lupus complicated by cerebritis (on CellCept and prednisone) and nephritis, CKD 3, adrenal insufficiency, HFpEF (EF 40-45% on 5/2024), sinus node dysfunction s/p pacemaker (5/17/2024), CAD (s/p CABG 2013), COPD, GERD, and Afib (switched from Eliquis to Heparin by her SNF due to thrombocytopenia) admitted for AMS secondary to septic shock in the setting of UTI and complicated by adrenal insufficiency.    She was admitted to the ICU for septic shock refractory to fluids and started on pressors. She was weaned off vasopressors on 10/1 and transferred to regular floor on 10/3. She was given stress-dose Solu-Cortef for shock in the setting of adrenal insufficiency.  UTI was treated with Rocephin and Vancomycin initially, but switched to Ertapenem after culture grew ESBL Klebsiella.  An NG tube was placed for administration of home PO meds given her AMS.  At baseline her mentation fluctuates and at her best she is oriented to self, place, and time, and can respond fully to questioning.  AMS initially improved during admission but she continued to remain lethargic.  Decision was made to keep Corpak in place given her poor oral intake.  Ultimately, patient was deemed medically stable for discharge on 10/10.  She is planning on returning to [place] for long-term care.

## 2024-10-04 NOTE — CARE PLAN
Pt remained hds throughout the shift. Pt temp 35.3 beginning of the shift. Provider notified and pt given 2 warm blankets. Last temp check 36.3. Glucerna tube feed titrated up to 40mL/hr, tolerating well. Pt follows some commands but is not oriented to time or situation. Pt had 2 BM during the night and did not have any UO. Swallow eval conducted with nectar thick liquids, pt held liquids in mouth and was unable to swallow. Provider aware and speech therapy to evaluate. VS stable. Safety maintained.     Problem: Pain - Adult  Goal: Verbalizes/displays adequate comfort level or baseline comfort level  10/4/2024 0642 by Hailey Manley RN  Outcome: Progressing  10/3/2024 2243 by Hailey Manley RN  Flowsheets (Taken 10/3/2024 2243)  Verbalizes/displays adequate comfort level or baseline comfort level:   Administer analgesics based on type and severity of pain and evaluate response   Assess pain using appropriate pain scale   Implement non-pharmacological measures as appropriate and evaluate response   Encourage patient to monitor pain and request assistance     Problem: Safety - Adult  Goal: Free from fall injury  Outcome: Met     Problem: Discharge Planning  Goal: Discharge to home or other facility with appropriate resources  10/4/2024 0642 by Hailey Manley RN  Outcome: Progressing  10/3/2024 2243 by Hailey Manley RN  Flowsheets (Taken 10/3/2024 2243)  Discharge to home or other facility with appropriate resources:   Identify barriers to discharge with patient and caregiver   Identify discharge learning needs (meds, wound care, etc)   Arrange for needed discharge resources and transportation as appropriate     Problem: Chronic Conditions and Co-morbidities  Goal: Patient's chronic conditions and co-morbidity symptoms are monitored and maintained or improved  10/4/2024 0642 by Hailey Manley RN  Outcome: Progressing  10/3/2024 2243 by Hailey Manley RN  Flowsheets (Taken 10/3/2024 2243)  Care Plan -  Patient's Chronic Conditions and Co-Morbidity Symptoms are Monitored and Maintained or Improved: Monitor and assess patient's chronic conditions and comorbid symptoms for stability, deterioration, or improvement     Problem: Fall/Injury  Goal: Verbalize understanding of personal risk factors for fall in the hospital  Outcome: Progressing  Goal: Verbalize understanding of risk factor reduction measures to prevent injury from fall in the home  Outcome: Progressing     Problem: Skin  Goal: Prevent/manage excess moisture  10/4/2024 0642 by Hailey Manley RN  Outcome: Progressing  10/3/2024 2243 by Hailey Manley RN  Flowsheets (Taken 10/3/2024 2243)  Prevent/manage excess moisture:   Cleanse incontinence/protect with barrier cream   Monitor for/manage infection if present   Moisturize dry skin     Problem: Skin  Goal: Prevent/minimize sheer/friction injuries  10/4/2024 0642 by Hailey Manley RN  Outcome: Progressing  10/3/2024 2243 by Hailey Manley RN  Flowsheets (Taken 10/3/2024 2243)  Prevent/minimize sheer/friction injuries:   Use pull sheet   Turn/reposition every 2 hours/use positioning/transfer devices     Problem: Perfusion/Cardiac  Goal: Hemodynamically stable  Outcome: Progressing     Problem: Perfusion/Cardiac  Goal: No cardiac arrhythmias  Outcome: Progressing     Problem: Diabetes  Goal: Maintain glucose levels >70mg/dl to <250mg/dl throughout shift  Outcome: Progressing     Problem: Nutrition  Goal: BG  mg/dL  Outcome: Progressing     Problem: Nutrition  Goal: Lab values WNL  Outcome: Progressing     Problem: Nutrition  Goal: Electrolytes WNL  Outcome: Progressing    The clinical goals for the shift include Pt will remaind hds throughout the fit.

## 2024-10-04 NOTE — PROGRESS NOTES
PHARMACIST CONSULT  RENAL DOSING ADJUSTMENT    Patient Name: Bing Holliday  MRN: [id number]  Admission Date: 9/30/2024  9:01 AM  Date/Time of Consult: 10/04/24 at 8:19 AM    In accordance with the inpatient pharmacy renal dose adjustment consult agreement the following medication changes have been made:  Antimicrobial: currently ordered ertapenem 1 g q24hr, will be adjusted to 500 mg q24hr      Results from last 72 hours   Lab Units 10/04/24  0454 10/03/24  0331 10/02/24  0459   CREATININE mg/dL 2.78* 2.34* 2.01*   BUN mg/dL 43* 36* 32*       Serum creatinine: 2.78 mg/dL (H) 10/04/24 0454  Estimated creatinine clearance: 25.9 mL/min (A)      Per consult agreement, pharmacy will order related labs, evaluate results, and adjust dosing as it pertains to the drug therapy being managed.  Thank you for allowing me to participate in the care for this patient.    Signature: Veronica Pérez  10/04/24 at 8:19 AM

## 2024-10-04 NOTE — PROGRESS NOTES
"Speech-Language Pathology    SL[facility] Adult Inpatient Speech-Language Pathology Clinical Swallow Evaluation    Patient Name: Bing Holliday  MRN: [id number]  Today's Date: 10/4/2024   Time Calculation  Start Time: 1019  Stop Time: 1029  Time Calculation (min): 10 min         Current Problem:   1. Sepsis, due to unspecified organism, unspecified whether acute organ dysfunction present (Multi)        2. Hypoglycemia              Recommendations:  Risk for Aspiration: Other (Comment) (due to impaired level of alertness)  Additional Recommendations: NPO, Dysphagia treatment  Solid Diet Recommendations : Continue alternate means of nutrition  Liquid Diet Recommendations: NPO, Continue alternate means of nutrition  Compensatory Swallowing Strategies: Other (Comment) (STRICT oral care)  Medication Administration Recommendations: Non Oral  Follow up treatments: Other (Comment) (continue to assess for diet advancement as alertness improves)      Assessment:  Assessment Results:   Patient presents with possible oropharyngeal dysphagia impacted by cognitive status upon completion of swallow evaluation this date. Of note, patient is well known to ST from several prior swallow evaluations over the past year (most recently 7/30/24, but has also been evaluated 7/19/24, 7/2/24, 3/22/24, and 1/20/24). Patient's swallowing status appears to fluctuate with her level of alertness, but when alert, patient is generally able to tolerate a PO diet (mechanical soft solids and thin liquids at facility).  Upon assessment this date, patient was lethargic, but resistive to completion of oral care (repeatedly stated \"stop\"). Patient did not respond to any questions or commands during assessment, and oral motor assessment was unable to be completed. Further assessment deferred pending improved alertness and ability to participate in assessment. Patient with NG tube in place for feeding/medications at this time.  ST to continue to follow during " "inpatient stay for further assessment, and will consider for diet advancement as alertness improves.  Prognosis: Fair  Treatment Provided: No  Medical Staff Made Aware: Yes  Barriers: Cognition, Comorbidities, Motivation      Plan:  Inpatient/Swing Bed or Outpatient: Inpatient  Treatment/Interventions: Bolus trials, Diet recommendations, Patient/family education  SLP Plan: Skilled SLP  SLP Frequency: 3x per week  Duration: 2 weeks  SLP Discharge Recommendations: Other (Comment) (pending progress during hospital course)  Next Treatment Priority: Continue to assess for diet advancement  Discussed POC: Patient, Nursing  Patient/Caregiver Agreeable: Other (Comment) (unable to state)    Goals (established 12/4/24):  Patient will complete further assessment of swallowing to determine PO diet advancement vs need for further assessment (i.e., MBSS).    Subjective   Patient repeating \"stop\" and did not follow commands or respond to questions during assessment.      General Visit Information:  Patient Class: Inpatient  Living Environment: Nursing home (skilled/long-term)  Caregiver Feedback: RN reported patient has been lethargic this date.  Ordering Physician: Mynor Jennings DO  Reason for Referral: Swallow evaluation  Past Medical History Relevant to Rehab: SLE, lupus cerebritis, RA, Raynaud's syndrome, hyperparathyroidism, adrenal insufficiency, COPD, DM, HTN, HLD, atrial fibrillation (not on anticoagulation), CKD, pacemaker, seizures, psychosis  Prior Level of Function: Decreased function  Patient Seen During This Visit: Yes  Total Number of Visits : 1  Prior to Session Communication: Bedside nurse  Date of Onset: 09/30/24  Date of Order: 10/03/24  [employer] Diet: Mechanical soft solids and thin liquids  Current Diet : NPO with tube feeding  Dysphagia Diagnosis: Other (Comment) (possible dysphagia, impacted by level of alertness)    Objective     Baseline Assessment:  Respiratory Status: Room air  History of Intubation: " "No  Behavior/Cognition: Lethargic, Doesn't follow directions, Other (comment) (mild agitation)  Patient Positioning: Upright in Bed  Baseline Vocal Quality: Weak  Volitional Cough: Other (Comment) (did not elicit upon command)  Volitional Swallow: Other (Comment) (did not elicit upon command)    Relevant Imaging:    CT Head 9/30/24: \"IMPRESSION:  No acute finding.  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 Presumed chronic microvascular ischemic  changes deep cerebral white matter.\"    Chest XR 9/30/24: \"IMPRESSION:  No acute process.\"    Pain:  Pain Assessment  Pain Assessment: PAINAD (Pain Assessment in Advanced Dementia Scale)  PAINAD (Pain Assessment in Advanced Dementia)  Breathing: Occasional labored breathing, short period of hyperventilation  Negative Vocalization: Occasional moan/groan, low speech, negative/disapproving quality  Facial Expression: Smiling or inexpressive  Body Language: Tense, distressed pacing, fidgeting  Consolability: Distracted or reassured by voice/touch  PAINAD Score: 4    Oral/Motor Assessment:  Oral Hygiene: Adequate  Dentition: Adequate/Natural  Oral Motor: Unable to Complete      Consistencies Trialed:  Consistencies Trialed: Unable to assess      Clinical Observations:  Patient Positioning: Upright in Bed  Was The 3 oz Swallow Protocol Completed: No, Other (Comment) (unable to follow commands)    Inpatient:    Education:  Learner patient   Barriers to Learning acuteness of illness barrier; cognitive limitations barrier; communication limitations barrier   Method demonstration; verbal   Education - Topic ST provided patient education regarding role of ST, purpose of assessment, clinical impressions, goals of treatment, and plan of care. Patient verbalized questionable comprehension, consistent with cognitive status. Education will be reinforced. ST further coordinated with RN regarding recommendations and precautions per this " assessment, with RN verbalizing understanding.     Outcome    Needs review/reinforcement

## 2024-10-04 NOTE — PROGRESS NOTES
INPATIENT PROGRESS NOTES    PATIENT NAME: Bing Holliday    MRN: [id number]  SERVICE DATE:  10/4/2024   SERVICE TIME:  9:16 AM    SUBJECTIVE  Tachycardic overnight, but otherwise HDS on RA. NAEON. Patient is more lethargic this morning, but is less agitated.    OBJECTIVE  PHYSICAL EXAM:   Patient Vitals for the past 24 hrs:   BP Temp Temp src Pulse Resp SpO2 Weight   10/04/24 0850 111/79 36 °C (96.8 °F) Temporal (!) 122 20 92 % --   10/04/24 0500 -- -- -- (!) 122 25 91 % --   10/04/24 0342 117/71 36.3 °C (97.3 °F) Temporal (!) 116 19 93 % 89.4 kg (197 lb 1.5 oz)   10/03/24 2345 -- -- -- 108 -- 92 % --   10/03/24 2322 131/90 35.9 °C (96.6 °F) Temporal (!) 112 16 93 % --   10/03/24 2001 136/89 35.3 °C (95.5 °F) Temporal 102 18 93 % --   10/03/24 1600 115/85 35.4 °C (95.7 °F) Temporal 100 19 97 % --   10/03/24 1500 116/87 -- -- 102 18 95 % --   10/03/24 1400 122/86 -- -- 106 18 95 % --   10/03/24 1300 (!) 113/91 -- -- 108 20 95 % --   10/03/24 1200 119/90 35.7 °C (96.3 °F) Temporal 106 19 96 % --   10/03/24 1100 (!) 118/91 -- -- 108 22 97 % --   10/03/24 1000 (!) 112/93 -- -- 104 19 96 % --     Gen: NAD  Neck: symmetric, no mass  Cardiovascular: Irregular rate and rhythm, no obvious murmurs  Respiratory: No distress, CTAB  GI: Abd soft, nontender, non-distended, BS+  Extremities: Bilateral nonpitting edema of the bilateral lower extremities  Skin: Warm and dry.  Neuro: Lethargic, but does open eyes to verbal stimuli and move extremities spontaneously    Labs:  Lab Results   Component Value Date    WBC 8.3 10/04/2024    HGB 8.8 (L) 10/04/2024    HCT 29.1 (L) 10/04/2024    MCV 94 10/04/2024    PLT 88 (L) 10/04/2024     Lab Results   Component Value Date    GLUCOSE 166 (H) 10/04/2024    CALCIUM 8.5 (L) 10/04/2024     10/04/2024    K 4.0 10/04/2024    CO2 21 10/04/2024     (H) 10/04/2024    BUN 43 (H) 10/04/2024    CREATININE 2.78 (H) 10/04/2024   ESR: --  Lab Results   Component Value Date    SEDRATE 2  "07/22/2024     Lab Results   Component Value Date    CRP 2.57 (H) 07/22/2024     Lab Results   Component Value Date    ALT 8 09/30/2024    AST 13 09/30/2024    GGT 63 (H) 01/07/2021    ALKPHOS 108 09/30/2024    BILITOT 0.6 09/30/2024     DATA:   Diagnostic tests reviewed for today's visit:    Labs this admission reviewed  Imagings this admission reviewed  Cultures: Reviewed    ASSESSMENT :   # Septic shock due to UTI - improved  # 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 Repeat cultures 10/1 NGTD.  - UA showing leukocyte esterase, nitrites, > 50 WBCs, and 1+ bacteria. Culture growing > 100,000 ESBL klebsiella  - MRSA screen growing MSSA  - Continue Ertapenem  - Closely monitor for antibiotics side effects including rash, Diarrhea/CDI, thrombocytopenia, JED, etc.  - Defer to primary regarding management of other comorbid conditions     Will continue to follow.    The assessment and plan were discussed with the attending physician,  Cheko Angel, DO  Internal Medicine PGY-2    This note was partially created using voice recognition software and is inherently subject to errors including those of syntax and \"sound-alike\" substitutions which may escape proofreading. In such instances, original meaning may be extrapolated by contextual derivation   "

## 2024-10-04 NOTE — PROGRESS NOTES
"Bing Holliday is a 63 y.o. female on day 4 of admission presenting with Sepsis, due to unspecified organism, unspecified whether acute organ dysfunction present (Multi).    Subjective   Patient seen and evaluated at bedside this morning.  She appears more lethargic today, opening her eyes to command and responding to pain.  She did not respond verbally to questioning but did moan in response to painful stimuli.  Overnight she was tachycardic (HR 110s-130s) but was otherwise hemodynamically stable.  NG tube placed yesterday.         Objective     Physical Exam  Vitals reviewed.   Constitutional:       General: She is not in acute distress.     Appearance: She is not ill-appearing.   HENT:      Head: Normocephalic and atraumatic.      Mouth/Throat:      Mouth: Mucous membranes are dry.   Eyes:      Pupils: Pupils are equal, round, and reactive to light.   Cardiovascular:      Rate and Rhythm: Tachycardia present. Rhythm irregular.      Pulses: Normal pulses.   Pulmonary:      Effort: Pulmonary effort is normal. No respiratory distress.      Breath sounds: No wheezing, rhonchi or rales.   Abdominal:      General: Abdomen is flat. There is no distension.      Palpations: Abdomen is soft.      Tenderness: There is no abdominal tenderness.   Musculoskeletal:      Right lower leg: No edema.      Left lower leg: No edema.   Skin:     General: Skin is warm and dry.   Neurological:      Mental Status: She is lethargic.      GCS: GCS eye subscore is 3. GCS verbal subscore is 2. GCS motor subscore is 5.         Last Recorded Vitals  Blood pressure 111/79, pulse (!) 122, temperature 36 °C (96.8 °F), temperature source Temporal, resp. rate 20, height 1.803 m (5' 11\"), weight 89.4 kg (197 lb 1.5 oz), SpO2 92%.  Intake/Output last 3 Shifts:  I/O last 3 completed shifts:  In: 235.6 (2.8 mL/kg) [I.V.:135.6 (1.6 mL/kg); IV Piggyback:100]  Out: - (0 mL/kg)   Dosing Weight: 84.7 kg     Relevant Results             "             Malnutrition Diagnosis Status: New  Malnutrition Diagnosis: Moderate malnutrition related to chronic disease or condition  As Evidenced by: recurrent adrenal crisis with resulting inconsistent oral intakes due to altered mentation, 9.6% weight loss x3 months with moderate fat and muscle mass wasting.  I agree with the dietitian's malnutrition diagnosis.      Assessment/Plan   Assessment & Plan  Sepsis, due to unspecified organism, unspecified whether acute organ dysfunction present (Multi)    Bing Holliday is a 63-year-old female transferred from ICU to step-down unit for further management of acute metabolic encephalopathy secondary to UTI and complicated by adrenal insufficiency.       #Acute metabolic encephalopathy 2/2 UTI and c/b adrenal insufficiency   #Lupus cerebritis  - Patient treated for septic shock in the ICU with pressors and Vancomycin and Rocephin for UTI.  Weaned off of pressors on 10/1 at 9pm with BP stable since.  Shock was complicated by adrenal insufficiency that was likely worsening in the setting of UTI.  Patient's shock has resolved.  AMS was improving on stress-dose steroids, however patient appeared more lethargic today after she was weaned down to her home dose of Solu-Cortef yesterday while still in the ICU.   - Urine culture grew ESBL Klebsiella pneumoniae/variicola.  Rocephin was discontinued and Ertapenem was started based on sensitivities and ID recs.   - She is normally on insulin at home (Lantus 10 units AM and sliding scale).  Originally was hypoglycemic at presentation with glucose 43 but hypoglycemia has resolved since.    - Lupus cerebritis has historically been controlled on CellCept and Cortef 10 mg AM/20 mg PM, fludrocortisone 0.1 mg every other day  - Patient had one positive blood culture growing Staph aureus/epidermidis/pettenkoferi.  Likely a contaminant; repeat cultures no growth x2 days.       Plan:   - Begin Ertapenem per ID recs   - Resume stress-dose  Solu-Cortef 50 mg via NG tube q8H and wean back to baseline cortef dose as mental status and vitals continue to improve   - Hold home CellCept in the setting of infection   - Continue to hold home BP medications   - NG tube for home meds.  Can remove once mentation improves and patient passes bedside swallow eval   - Patient evaluated by speech today but swallow study unable to be completed due to patient's decreased mentation.  Will continue nutrition/medication via NG tube for now and reassess when mentation improves      #Afib with RVR  - tachycardic overnight and this AM, HR 110s-130s.  Otherwise hemodynamically stable.      Plan:  Home Metoprolol tartrate 25 mg BID restarted     #Acute on Chronic Anemia  #Anemia of Chronic Disease  #Chronic Pancytopenia  - Hemoglobin dropped from 7.6 to 6.8 on 10/2.  Patient receiving 1 unit PRBC.  No signs of bleeding on exam.  Hgb now stable.    -Pancytopenia at baseline, but slightly worse in setting of septic shock.  Plt trending up     Plan:   - Continue to trend with daily CBC     #Hypoglycemia? 2/2 poor oral intake -resolved   #Uncontrolled IDDM2  -Hx of labile sugars; insulin held due to hypoglycemia concerns with poor oral intake requiring NG tube.  Glucose is stable.      Plan:  -Accuchecks     #JED on CKD 3  - Cr trending up at 2.78 today (baseline 1.4 -1.9).  Etiology likely prerenal secondary to decreased perfusion from septic shock requiring pressors vs intrinsic renal.  Patient appears dry on exam, further supporting prerenal etiology. Obstructive etiology was ruled out with kidney US which did not show hydronephrosis or nephrolithiasis and bladder scan not suggestive of retention.       Plan:   - Trend Cr with daily RFP  - Increase water flushes to 200 q4H        Chronic problems:   #Afib (on Heparin due to thrombocytopenia from Eliquis)  #CKD3  #HFpEF  #COPD  #GERD  #CAD  - continue home medications as appropriate (atorvastatin 40 mg, Keppra 500 mgm BID, Zoloft  25 mg)  - holding Risperidone 0.25 mg (home paliperidone not on formulary) until patient's mental status improves enough to be reevaluated.                Tsering Mcclendon, OMS-4  10/4/2024

## 2024-10-04 NOTE — CONSULTS
Reason For Consult  Goals of care    History Of Present Illness  Bing Holliday is a 63 y.o. 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 Per EMS, patient had been more confused from baseline (A&Ox3) and having hallucinations. Her blood sugar had been running borderline low over the past few days, but according to staff she was not complaining of any other symptoms like cough, chest pain, SOB, diarrhea, urinary symptoms, or fever.         Past Medical History  She has a past medical history of Abnormal kidney function (04/04/2023), Acute headache (03/10/2024), Acute iritis of right eye (07/24/2015), Acute low back pain (10/30/2023), Acute upper respiratory infection, unspecified (03/04/2020), Acute upper respiratory infection, unspecified (09/30/2015), Arthralgia of right knee (02/14/2024), Arthritis, Atypical facial pain (03/10/2024), Body mass index (BMI) 23.0-23.9, adult (10/15/2021), Body mass index (BMI) 33.0-33.9, adult (03/04/2020), Cardiomegaly (08/27/2013), Chest pain (06/10/2022), Chronic kidney disease, stage 3 unspecified (Multi) (07/02/2013), Confusional state (03/10/2024), Contact with and (suspected) exposure to covid-19 (04/04/2023), Delirium (03/10/2024), Disease of pericardium, unspecified (07/02/2013), Encounter for follow-up examination after completed treatment for conditions other than malignant neoplasm (10/06/2022), Generalized contraction of visual field, right eye (01/29/2015), Hearing loss (03/10/2024), History of cataract (03/10/2024), History of thrombocytopenia (03/10/2024), Homonymous bilateral field  defects, right side (04/29/2016), Hypertensive chronic kidney disease with stage 1 through stage 4 chronic kidney disease, or unspecified chronic kidney disease (07/02/2013), Laceration without foreign body, left foot, initial encounter (07/03/2018), Localized edema (03/10/2024), Localized, primary osteoarthritis (02/14/2024), Mass of skin (03/10/2024), Migraine with aura, not intractable, without status migrainosus (10/24/2022), Open wound (03/10/2024), Open wound of left foot (03/10/2024), Other conditions influencing health status (07/02/2013), Other conditions influencing health status (07/02/2013), Other conditions influencing health status (07/02/2013), Other conditions influencing health status (05/22/2015), Other conditions influencing health status (10/24/2022), Other conditions influencing health status (03/14/2022), Other long term (current) drug therapy (10/24/2022), Overweight with body mass index (BMI) 25.0-29.9 (03/10/2024), Pain of toe (03/10/2024), Personal history of COVID-19 (04/04/2023), Personal history of diseases of the blood and blood-forming organs and certain disorders involving the immune mechanism (07/02/2013), Personal history of diseases of the skin and subcutaneous tissue (08/11/2015), Personal history of nephrotic syndrome (07/02/2013), Personal history of other diseases of the circulatory system (08/27/2013), Personal history of other diseases of the nervous system and sense organs, Personal history of other diseases of the respiratory system, Personal history of other infectious and parasitic diseases (07/02/2013), Personal history of other specified conditions, Personal history of other specified conditions (08/27/2013), Posterior epistaxis (03/10/2024), Puckering of macula, right eye (10/24/2022), Raynaud's syndrome without gangrene (07/02/2013), Sinusitis (03/10/2024), Systemic lupus erythematosus, unspecified (07/24/2015), Systemic lupus erythematosus, unspecified (07/24/2015),  "Systemic lupus erythematosus, unspecified (07/24/2015), 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), Type 2 diabetes mellitus with diabetic nephropathy (07/02/2013), Type 2 diabetes mellitus with mild nonproliferative diabetic retinopathy without macular edema, left eye (07/27/2015), Type 2 diabetes mellitus with mild nonproliferative diabetic retinopathy without macular edema, unspecified eye (07/24/2015), Type 2 diabetes mellitus with proliferative diabetic retinopathy without macular edema, right eye (07/27/2015), Type 2 diabetes mellitus with proliferative diabetic retinopathy without macular edema, unspecified eye (07/24/2015), Unspecified acute and subacute iridocyclitis (07/24/2015), and Unspecified open wound, left foot, sequela (07/03/2018).      Assessment/Plan     Pt arrived from the [facility] for altered mental status. Pt is very familiar to the palliative care team through the years from previous admissions. Pt is disoriented with labored breathing upon rounds. Phone call placed to son López, who is very familiar with this writer. We discussed pts recent hospitalization at [facility] and time at the NH. López does not understand why his mother has not been able to walk or why she now has bed sores. He feels that this decline happened in the last month. However, López thinks that this admission is due to her adrenal insufficiency and states that she will \"be back to normal after steroids, like usual.\" López does not acknowledge that the pt has been in and out of the hospital for majority of the year and has seen little to no improvement from SNF stays. López is agreeable to palliative continuing to follow. He would like to give his mother more time to get back to baseline.     Discussed case with attending physician.  Will continue to follow        Ainsley Aviles RN    "

## 2024-10-04 NOTE — PROGRESS NOTES
Nutrition Follow Up Assessment:   Nutrition Assessment         Nutrition Note:  Bing Holliday is a 63 y.o. female presenting 9/30 from ECF with hypoglycemia, hallucinations/confusion. Current work up revealing UTI, + blood cultures, septic shock and adrenal insufficiency crisis, and hypoglycemia.  Pt presently on levophed with vasopressin on hold; ID involved in pt care.     10/4/2024 Follow up: Chart reviewed and events noted. Pt transitioned off levophed 10/1 and transferred out of ICU 10/3. EN feeds initiated 10/3 however pt pulled out NGT; bridled dobbhoff placed 10/3 and EN resumed. ST attempted bedside swallow evaluation however pt very lethargic.    Past Medical History: SLE, lupus cerebritis, RA, Raynaud's syndrome, hyperparathyroidism, adrenal insufficiency, COPD, DM, HTN, HLD, atrial fibrillation (not on anticoagulation), CKD, pacemaker, seizures, psychosis   Surgical History   has a past surgical history that includes Eye surgery (03/06/2015); Total hip arthroplasty (01/29/2015); Cholecystectomy (01/29/2015); Other surgical history (01/29/2015); Other surgical history (01/29/2015); Ankle surgery (01/29/2015); Foot surgery (01/29/2015); MR angio head wo IV contrast (7/26/2013); MR angio neck wo IV contrast (7/26/2013); CT guided percutaneous biopsy bone deep (5/4/2021); MR angio head wo IV contrast (9/17/2021); MR angio neck wo IV contrast (9/17/2021); MR angio neck wo IV contrast (3/25/2023); MR angio head wo IV contrast (3/25/2023); and Cardiac electrophysiology procedure (Left, 5/17/2024).       Nutrition History:  Food and Nutrient History: 10/1: Pt yelling out with hands on care otherwise does not follow directions or speak. Pt from [facility]--there on low concentrated sweets/mechanical soft with thin liquids. Spoke with [place] at facility ([phone])--states pt fed self and ate 50-75% of meals. Pt had been on Boost Glucose Control however the supplement was discontinued  "in 7/2024.  Vitamin/Herbal Supplement Use: home meds include melatonin, SSI, cortef, cellcept  Food Allergies/Intolerances:  None  GI Symptoms: None  Oral Problems: Chewing difficulty, Swallowing difficulty, and on mechanical soft diet PTA       Anthropometrics:  Height: 180.3 cm (5' 11\")   Weight: 89.4 kg (197 lb 1.5 oz)   BMI (Calculated): 27.5   Admit weight 84.7kg   IBW: 70.5kg       Weight History:   7/2024: 93.7kg 9.6% x3 months  6/2024: 95.7kg  4/2024: 90.9kg 6.8% x 6 months  10/2023: 94.6kg  Weight Change %: unintentional 9.6% x3 months. Pt at [facility] 7/15-7/30 with adrenal crisis with PPN 7/22-7/25 due to failed attempt at dobhoff placement (epitaxis). Pt was transferred to [facility] 7/30-8/2/2024 for rheumatologic evaluation with pt then more alert.      0-10 (Numeric) Pain Score: 0 - No pain  [place] FACES Pain Rating: Hurts little more  PAINAD Score:  [4]      Nutrition Focused Physical Exam Findings:  defer: limited as pt yells out with hands on care.   Subcutaneous Fat Loss:   Orbital Fat Pads: Mild-Moderate (slight dark circles and slight hollowing)  Buccal Fat Pads: Mild-Moderate (flat cheeks, minimal bounce)  Triceps: Defer  Ribs: Defer  Muscle Wasting:  Temporalis: Mild-Moderate (slight depression)  Pectoralis (Clavicular Region): Mild-Moderate (some protrusion of clavicle)  Deltoid/Trapezius: Defer  Interosseous: Defer  Trapezius/Infraspinatus/Supraspinatus (Scapular Region): Defer  Quadriceps: Defer  Gastrocnemius: Defer  Edema:  Edema: +1 trace  Edema Location: nonpitting BLE  Physical Findings:  Hair: Positive (sparse)  Skin: Positive (left elbow stage II, sacrum stage II, MASD, right leg wound)    Nutrition Significant Labs:  BMP Trend:   Results from last 7 days   Lab Units 10/04/24  0454 10/03/24  0331 10/02/24  0459 10/01/24  0339   GLUCOSE mg/dL 166* 155* 145* 201*   CALCIUM mg/dL 8.5* 8.9 8.6 8.6   SODIUM mmol/L 143 145 143 143   POTASSIUM mmol/L 4.0 4.5 3.8 3.4*   CO2 mmol/L 21 22 23 23 "   CHLORIDE mmol/L 109* 110* 109* 107   BUN mg/dL 43* 36* 32* 33*   CREATININE mg/dL 2.78* 2.34* 2.01* 1.97*    , A1C:  Lab Results   Component Value Date    HGBA1C 6.8 (H) 06/04/2024   , BG POCT trend:   Results from last 7 days   Lab Units 10/04/24  1159 10/04/24  0711 10/03/24  1707 10/03/24  0836 10/02/24  0742   POCT GLUCOSE mg/dL 213* 167* 157* 173* 129*    , Liver Function Trend:   Results from last 7 days   Lab Units 09/30/24  0944   ALK PHOS U/L 108   AST U/L 13   ALT U/L 8   BILIRUBIN TOTAL mg/dL 0.6    , Vit B12:   Lab Results   Component Value Date    XRSVHKRO33 1,499 (H) 03/20/2024    , Folate:   Lab Results   Component Value Date    FOLATE >24.0 04/25/2024    , CF Vitamin Labs:   Lab Results   Component Value Date    VITD25 34 12/10/2022        Nutrition Specific Medications:  Scheduled medications  atorvastatin, 40 mg, nasogastric tube, Nightly  [Held by provider] budesonide, 0.5 mg, nebulization, BID  ertapenem, 500 mg, intravenous, q24h  influenza, 0.5 mL, intramuscular, During hospitalization  [Held by provider] furosemide, 40 mg, oral, Daily  heparin (porcine), 5,000 Units, subcutaneous, q8h  hydrocortisone sodium succinate, 50 mg, intravenous, q8h  levETIRAcetam, 500 mg, nasogastric tube, BID  metoprolol tartrate, 25 mg, oral, BID  pantoprazole, 40 mg, intravenous, Daily before breakfast  [Held by provider] polyethylene glycol, 17 g, oral, Daily  [Held by provider] risperiDONE, 1 mg, nasogastric tube, BID  sertraline, 25 mg, nasogastric tube, Daily      Continuous medications       PRN medications  PRN medications: dextrose, dextrose, glucagon, glucagon, [Held by provider] ipratropium-albuteroL     I/O:   Last BM Date: 10/04/24; Stool Appearance: Loose (10/04/24 1200)    Dietary Orders (From admission, onward)       Start     Ordered    10/04/24 0820  Enteral feeding with NPO 55 (Titrate every 6 hours, start at 20); 200; Water; Tap water; Every 4 hours  Diet effective now        Question Answer  Comment   Tube feeding formula: Glucerna 1.5    Tube feeding continuous rate (mL/hr): 55 Titrate every 6 hours, start at 20   Tube feeding flush (mL): 200    Flush type: Water    Water type: Tap water    Flush frequency: Every 4 hours        10/04/24 0820    09/30/24 1901  May Participate in Room Service With Assistance  Once        Question:  .  Answer:  Yes    09/30/24 1900                     Estimated Needs:   Total Energy Estimated Needs (kCal):  (4626-3049 (24-26kcal/kg of 84.7kg))     Total Protein Estimated Needs (g):  (92-113g (1.3-1.6g/kg of 70.5kg))     Total Fluid Estimated Needs (mL):  (1mL/kcal/d or as per physician)           Nutrition Diagnosis   Malnutrition Diagnosis  Patient has Malnutrition Diagnosis: Yes  Diagnosis Status: Ongoing  Malnutrition Diagnosis: Moderate malnutrition related to chronic disease or condition  As Evidenced by: recurrent adrenal crisis with resulting inconsistent oral intakes due to altered mentation, 9.6% weight loss x3 months with moderate fat and muscle mass wasting.  Additional Assessment Information: 10/4: Case discussed with nurse; pt with 3 loose stools since early AM and EN at 40mL/hr with goal of 55mL/hr. Water flushes increased to 200mL 6x/day today (10/4). Pt too lethargic for ST intervention.            Nutrition Interventions/Recommendations         Nutrition Prescription:  Individualized Nutrition Prescription Provided for : continue enteral nutrition        Nutrition Interventions:   Enteral Intake: Other (Comment) (continue tube feeds)  Goal: Continue tube feeds with Glucerna goal of 55mL/hr to provide 1980kcal, 109g pro, and 1002mL formula free water or 23kcal/kg and 1.5g pro/kg IBW.   Adjust water flush pending fluid needs--increased 10/4 to 200mL 6x/day for 2202mL water (formula + flush). (If loose stools worsen to >6 per day, consider changing EN to Vital 1.5 goal of 55mL/hr to provide 1980kcal, 89g pro, and 1008mL formula free water.  Adjust water  flush pending fluid needs--suggest continue with 200mL 6x/day.)    Collaboration and Referral of Nutrition Care: Collaboration by nutrition professional with other providers  Goal: RN    Nutrition Education:   10/4: pt not appropriate for education at present time.        Nutrition Monitoring and Evaluation   Food/Nutrient Related History Monitoring  Monitoring and Evaluation Plan: Energy intake  Energy Intake: Estimated energy intake  Criteria: >75% of estimated nutrient needs via EN    Body Composition/Growth/Weight History  Monitoring and Evaluation Plan: Weight  Weight: Measured weight  Criteria: daily weight    Biochemical Data, Medical Tests and Procedures  Monitoring and Evaluation Plan: Electrolyte/renal panel, Glucose/endocrine profile  Electrolyte and Renal Panel: Magnesium, Phosphorus, Potassium, Sodium  Criteria: lytes WNR  Glucose/Endocrine Profile: Glucose, casual, Glucose, fasting  Criteria: BG 70-180mg/dL    Nutrition Focused Physical Findings  Monitoring and Evaluation Plan: Skin  Skin: Impaired wound healing  Criteria: promoate skin integrity         Time Spent (min): 45 minutes

## 2024-10-05 LAB
ALBUMIN SERPL BCP-MCNC: 3 G/DL (ref 3.4–5)
ALBUMIN SERPL BCP-MCNC: 3 G/DL (ref 3.4–5)
ANION GAP SERPL CALC-SCNC: 15 MMOL/L (ref 10–20)
ANION GAP SERPL CALC-SCNC: 17 MMOL/L (ref 10–20)
BACTERIA BLD AEROBE CULT: ABNORMAL
BACTERIA BLD CULT: ABNORMAL
BUN SERPL-MCNC: 50 MG/DL (ref 6–23)
BUN SERPL-MCNC: 52 MG/DL (ref 6–23)
CALCIUM SERPL-MCNC: 8.2 MG/DL (ref 8.6–10.3)
CALCIUM SERPL-MCNC: 8.3 MG/DL (ref 8.6–10.3)
CHLORIDE SERPL-SCNC: 109 MMOL/L (ref 98–107)
CHLORIDE SERPL-SCNC: 109 MMOL/L (ref 98–107)
CO2 SERPL-SCNC: 20 MMOL/L (ref 21–32)
CO2 SERPL-SCNC: 22 MMOL/L (ref 21–32)
CREAT SERPL-MCNC: 2.53 MG/DL (ref 0.5–1.05)
CREAT SERPL-MCNC: 2.92 MG/DL (ref 0.5–1.05)
EGFRCR SERPLBLD CKD-EPI 2021: 18 ML/MIN/1.73M*2
EGFRCR SERPLBLD CKD-EPI 2021: 21 ML/MIN/1.73M*2
ERYTHROCYTE [DISTWIDTH] IN BLOOD BY AUTOMATED COUNT: 21.9 % (ref 11.5–14.5)
GLUCOSE BLD MANUAL STRIP-MCNC: 197 MG/DL (ref 74–99)
GLUCOSE BLD MANUAL STRIP-MCNC: 252 MG/DL (ref 74–99)
GLUCOSE BLD MANUAL STRIP-MCNC: 262 MG/DL (ref 74–99)
GLUCOSE SERPL-MCNC: 239 MG/DL (ref 74–99)
GLUCOSE SERPL-MCNC: 300 MG/DL (ref 74–99)
GRAM STN SPEC: ABNORMAL
GRAM STN SPEC: ABNORMAL
HCT VFR BLD AUTO: 30.1 % (ref 36–46)
HGB BLD-MCNC: 8.9 G/DL (ref 12–16)
HOLD SPECIMEN: NORMAL
LACTATE SERPL-SCNC: 1.2 MMOL/L (ref 0.4–2)
MAGNESIUM SERPL-MCNC: 2.71 MG/DL (ref 1.6–2.4)
MCH RBC QN AUTO: 28.5 PG (ref 26–34)
MCHC RBC AUTO-ENTMCNC: 29.6 G/DL (ref 32–36)
MCV RBC AUTO: 97 FL (ref 80–100)
NRBC BLD-RTO: 4.4 /100 WBCS (ref 0–0)
PHOSPHATE SERPL-MCNC: 2.4 MG/DL (ref 2.5–4.9)
PHOSPHATE SERPL-MCNC: 2.9 MG/DL (ref 2.5–4.9)
PLATELET # BLD AUTO: 89 X10*3/UL (ref 150–450)
POTASSIUM SERPL-SCNC: 4.1 MMOL/L (ref 3.5–5.3)
POTASSIUM SERPL-SCNC: 4.1 MMOL/L (ref 3.5–5.3)
RBC # BLD AUTO: 3.12 X10*6/UL (ref 4–5.2)
SODIUM SERPL-SCNC: 142 MMOL/L (ref 136–145)
SODIUM SERPL-SCNC: 142 MMOL/L (ref 136–145)
WBC # BLD AUTO: 10.9 X10*3/UL (ref 4.4–11.3)

## 2024-10-05 PROCEDURE — 2500000004 HC RX 250 GENERAL PHARMACY W/ HCPCS (ALT 636 FOR OP/ED): Performed by: STUDENT IN AN ORGANIZED HEALTH CARE EDUCATION/TRAINING PROGRAM

## 2024-10-05 PROCEDURE — 2500000001 HC RX 250 WO HCPCS SELF ADMINISTERED DRUGS (ALT 637 FOR MEDICARE OP)

## 2024-10-05 PROCEDURE — 2500000004 HC RX 250 GENERAL PHARMACY W/ HCPCS (ALT 636 FOR OP/ED)

## 2024-10-05 PROCEDURE — 82947 ASSAY GLUCOSE BLOOD QUANT: CPT

## 2024-10-05 PROCEDURE — 85027 COMPLETE CBC AUTOMATED: CPT

## 2024-10-05 PROCEDURE — 2500000005 HC RX 250 GENERAL PHARMACY W/O HCPCS

## 2024-10-05 PROCEDURE — 80069 RENAL FUNCTION PANEL: CPT

## 2024-10-05 PROCEDURE — 83735 ASSAY OF MAGNESIUM: CPT

## 2024-10-05 PROCEDURE — 99233 SBSQ HOSP IP/OBS HIGH 50: CPT

## 2024-10-05 PROCEDURE — 2500000004 HC RX 250 GENERAL PHARMACY W/ HCPCS (ALT 636 FOR OP/ED): Performed by: INTERNAL MEDICINE

## 2024-10-05 PROCEDURE — 36415 COLL VENOUS BLD VENIPUNCTURE: CPT

## 2024-10-05 PROCEDURE — 36415 COLL VENOUS BLD VENIPUNCTURE: CPT | Performed by: STUDENT IN AN ORGANIZED HEALTH CARE EDUCATION/TRAINING PROGRAM

## 2024-10-05 PROCEDURE — 2060000001 HC INTERMEDIATE ICU ROOM DAILY

## 2024-10-05 PROCEDURE — 2500000002 HC RX 250 W HCPCS SELF ADMINISTERED DRUGS (ALT 637 FOR MEDICARE OP, ALT 636 FOR OP/ED)

## 2024-10-05 PROCEDURE — 83605 ASSAY OF LACTIC ACID: CPT | Performed by: STUDENT IN AN ORGANIZED HEALTH CARE EDUCATION/TRAINING PROGRAM

## 2024-10-05 PROCEDURE — 2500000002 HC RX 250 W HCPCS SELF ADMINISTERED DRUGS (ALT 637 FOR MEDICARE OP, ALT 636 FOR OP/ED): Performed by: STUDENT IN AN ORGANIZED HEALTH CARE EDUCATION/TRAINING PROGRAM

## 2024-10-05 RX ORDER — SODIUM CHLORIDE 9 MG/ML
INJECTION, SOLUTION INTRAMUSCULAR; INTRAVENOUS; SUBCUTANEOUS
Status: COMPLETED
Start: 2024-10-05 | End: 2024-10-05

## 2024-10-05 RX ORDER — INSULIN LISPRO 100 [IU]/ML
0-15 INJECTION, SOLUTION INTRAVENOUS; SUBCUTANEOUS
Status: DISCONTINUED | OUTPATIENT
Start: 2024-10-05 | End: 2024-10-05

## 2024-10-05 RX ORDER — INSULIN LISPRO 100 [IU]/ML
0-10 INJECTION, SOLUTION INTRAVENOUS; SUBCUTANEOUS EVERY 6 HOURS
Status: DISPENSED | OUTPATIENT
Start: 2024-10-05

## 2024-10-05 RX ORDER — INSULIN LISPRO 100 [IU]/ML
0-10 INJECTION, SOLUTION INTRAVENOUS; SUBCUTANEOUS
Status: DISCONTINUED | OUTPATIENT
Start: 2024-10-05 | End: 2024-10-05

## 2024-10-05 RX ORDER — INSULIN GLARGINE 100 [IU]/ML
5 INJECTION, SOLUTION SUBCUTANEOUS NIGHTLY
Status: DISCONTINUED | OUTPATIENT
Start: 2024-10-05 | End: 2024-10-06

## 2024-10-05 ASSESSMENT — PAIN SCALES - GENERAL
PAINLEVEL_OUTOF10: 0 - NO PAIN

## 2024-10-05 ASSESSMENT — PAIN - FUNCTIONAL ASSESSMENT
PAIN_FUNCTIONAL_ASSESSMENT: WONG-BAKER FACES
PAIN_FUNCTIONAL_ASSESSMENT: 0-10
PAIN_FUNCTIONAL_ASSESSMENT: WONG-BAKER FACES
PAIN_FUNCTIONAL_ASSESSMENT: 0-10
PAIN_FUNCTIONAL_ASSESSMENT: 0-10

## 2024-10-05 ASSESSMENT — COGNITIVE AND FUNCTIONAL STATUS - GENERAL
PERSONAL GROOMING: TOTAL
DRESSING REGULAR LOWER BODY CLOTHING: TOTAL
WALKING IN HOSPITAL ROOM: TOTAL
WALKING IN HOSPITAL ROOM: TOTAL
TOILETING: TOTAL
EATING MEALS: TOTAL
TURNING FROM BACK TO SIDE WHILE IN FLAT BAD: TOTAL
TURNING FROM BACK TO SIDE WHILE IN FLAT BAD: TOTAL
EATING MEALS: TOTAL
PERSONAL GROOMING: TOTAL
DRESSING REGULAR UPPER BODY CLOTHING: TOTAL
DAILY ACTIVITIY SCORE: 6
MOBILITY SCORE: 6
DRESSING REGULAR LOWER BODY CLOTHING: TOTAL
MOVING FROM LYING ON BACK TO SITTING ON SIDE OF FLAT BED WITH BEDRAILS: TOTAL
CLIMB 3 TO 5 STEPS WITH RAILING: TOTAL
HELP NEEDED FOR BATHING: TOTAL
MOVING TO AND FROM BED TO CHAIR: TOTAL
TOILETING: TOTAL
DRESSING REGULAR UPPER BODY CLOTHING: TOTAL
MOVING FROM LYING ON BACK TO SITTING ON SIDE OF FLAT BED WITH BEDRAILS: TOTAL
STANDING UP FROM CHAIR USING ARMS: TOTAL
CLIMB 3 TO 5 STEPS WITH RAILING: TOTAL
STANDING UP FROM CHAIR USING ARMS: TOTAL
HELP NEEDED FOR BATHING: TOTAL
MOVING TO AND FROM BED TO CHAIR: TOTAL

## 2024-10-05 ASSESSMENT — PAIN SCALES - WONG BAKER
WONGBAKER_NUMERICALRESPONSE: NO HURT
WONGBAKER_NUMERICALRESPONSE: NO HURT

## 2024-10-05 ASSESSMENT — PAIN SCALES - PAIN ASSESSMENT IN ADVANCED DEMENTIA (PAINAD)
TOTALSCORE: REPOSITIONED
BREATHING: NORMAL
CONSOLABILITY: NO NEED TO CONSOLE
FACIALEXPRESSION: SMILING OR INEXPRESSIVE
TOTALSCORE: 0
BODYLANGUAGE: RELAXED

## 2024-10-05 NOTE — PROGRESS NOTES
"Bing Holliday is a 63 y.o. female on day 5 of admission presenting with Sepsis, due to unspecified organism, unspecified whether acute organ dysfunction present (Multi).    Subjective   Patient seen and evaluated at bedside this morning.  She is lethargic , opening her eyes to command and responding to pain.  She did not respond verbally to questioning but did moan in response to painful stimuli.  Overnight she had 4 episodes of watery diarrhea and pending c.diff screening but was otherwise hemodynamically stable. The dose of hydrocortisone increased to 50 q6hr from q8hr and will get IV fluid.    Objective     Physical Exam  Vitals reviewed.   Constitutional:       General: She is not in acute distress.     Appearance: She is not ill-appearing.   HENT:      Head: Normocephalic and atraumatic.      Mouth/Throat:      Mouth: Mucous membranes are dry.   Eyes:      Pupils: Pupils are equal, round, and reactive to light.   Cardiovascular:      Rate and Rhythm: Tachycardia present. Rhythm irregular.      Pulses: Normal pulses.   Pulmonary:      Effort: Pulmonary effort is normal. No respiratory distress.      Breath sounds: No wheezing, rhonchi or rales.   Abdominal:      General: Abdomen is flat. There is no distension.      Palpations: Abdomen is soft.      Tenderness: There is no abdominal tenderness.   Musculoskeletal:      Right lower leg: No edema.      Left lower leg: No edema.   Skin:     General: Skin is warm and dry.   Neurological:      Mental Status: She is lethargic.      GCS: GCS eye subscore is 3. GCS verbal subscore is 2. GCS motor subscore is 5.         Last Recorded Vitals  Blood pressure 116/77, pulse 100, temperature 36.8 °C (98.2 °F), temperature source Temporal, resp. rate 24, height 1.803 m (5' 11\"), weight 87.2 kg (192 lb 3.9 oz), SpO2 95%.  Intake/Output last 3 Shifts:  I/O last 3 completed shifts:  In: 20 (0.2 mL/kg) [I.V.:20 (0.2 mL/kg)]  Out: 450 (5.3 mL/kg) [Urine:450 (0.1 " mL/kg/hr)]  Dosing Weight: 84.7 kg     Relevant Results                          Malnutrition Diagnosis Status: Ongoing  Malnutrition Diagnosis: Moderate malnutrition related to chronic disease or condition  As Evidenced by: recurrent adrenal crisis with resulting inconsistent oral intakes due to altered mentation, 9.6% weight loss x3 months with moderate fat and muscle mass wasting.  I agree with the dietitian's malnutrition diagnosis.      Assessment/Plan   Assessment & Plan  Sepsis, due to unspecified organism, unspecified whether acute organ dysfunction present (Multi)    Lupus (systemic lupus erythematosus) (Multi)    Encephalopathy acute    Adrenal insufficiency (Multi)    Acute kidney injury (CMS-HCC)    Bing Holliday is a 63-year-old female transferred from ICU to step-down unit for further management of acute metabolic encephalopathy secondary to UTI and complicated by adrenal insufficiency.       #Acute metabolic encephalopathy 2/2 UTI and c/b adrenal insufficiency   #Lupus cerebritis  - Patient treated for septic shock in the ICU with pressors and Vancomycin and Rocephin for UTI.  Weaned off of pressors on 10/1 at 9pm with BP stable since.  Shock was complicated by adrenal insufficiency that was likely worsening in the setting of UTI.  Patient's shock has resolved.  AMS was improving on stress-dose steroids, however patient appeared lethargic today after she was weaned down to her home dose of Solu-Cortef yesterday while still in the ICU.   - Urine culture grew ESBL Klebsiella pneumoniae/variicola.  Rocephin was discontinued and Ertapenem was started based on sensitivities and ID recs.   - She is normally on insulin at home (Lantus 10 units AM and sliding scale).  Originally was hypoglycemic at presentation with glucose 43 but hypoglycemia has resolved since.    - Lupus cerebritis has historically been controlled on CellCept and Cortef 10 mg AM/20 mg PM, fludrocortisone 0.1 mg every other day  - Patient  had one positive blood culture growing Staph aureus/epidermidis/pettenkoferi.  Likely a contaminant; repeat cultures no growth x2 days.       Plan:   - Begin Ertapenem per ID recs   - Will titrate up stress-dose Solu-Cortef 50 mg  q8H and wean back to baseline cortef dose as mental status and vitals continue to improve   - Hold home CellCept in the setting of infection   - Continue to hold home BP medications   - NG tube for home meds.  Can remove once mentation improves and patient passes bedside swallow eval   - Patient evaluated by speech today but swallow study unable to be completed due to patient's decreased mentation.  Will continue nutrition/medication via NG tube for now and reassess when mentation improves      #Afib with RVR  - tachycardic overnight and this AM, HR 110s-130s.  Otherwise hemodynamically stable.      Plan:  Home Metoprolol tartrate 25 mg BID restarted     #Acute on Chronic Anemia  #Anemia of Chronic Disease  #Chronic Pancytopenia  - Hemoglobin dropped from 7.6 to 6.8 on 10/2.  Patient received 1 unit PRBC.  No signs of bleeding on exam.  Hgb now stable.    -Pancytopenia at baseline, but slightly worse in setting of septic shock.  Plt trending up     Plan:   - Continue to trend with daily CBC     #Hypoglycemia? 2/2 poor oral intake -resolved   #Uncontrolled IDDM2  -Hx of labile sugars; insulin held due to hypoglycemia concerns with poor oral intake requiring NG tube.  Glucose is stable.      Plan:  -Accuchecks     #JED on CKD 3  - Cr trending up at 2.78 today (baseline 1.4 -1.9).  Etiology likely prerenal secondary to decreased perfusion from septic shock requiring pressors vs intrinsic renal.  Patient appears dry on exam, further supporting prerenal etiology. Obstructive etiology was ruled out with kidney US which did not show hydronephrosis or nephrolithiasis and bladder scan not suggestive of retention.    - Will give her IV fluid and will get RFP in the afternoon.   Plan:   - Trend Cr  with daily RFP  - Increase water flushes to 200 q4H        Chronic problems:   #Afib (on Heparin due to thrombocytopenia from Eliquis)  #CKD3  #HFpEF  #COPD  #GERD  #CAD  - continue home medications as appropriate (atorvastatin 40 mg, Keppra 500 mgm BID, Zoloft 25 mg)  - holding Risperidone 0.25 mg (home paliperidone not on formulary) until patient's mental status improves enough to be reevaluated.                  Shalom Sage MD  PGY I Internal Medicine resident

## 2024-10-05 NOTE — PROGRESS NOTES
INPATIENT PROGRESS NOTES    PATIENT NAME: Bing Holliday    MRN: [id number]  SERVICE DATE:  10/5/2024   SERVICE TIME:  10:16 AM    SIGNATURE: Jean Cason MD      SUBJECTIVE  Afebrile  No events overnight       OBJECTIVE  PHYSICAL EXAM:   Patient Vitals for the past 24 hrs:   BP Temp Temp src Pulse Resp SpO2 Weight   10/05/24 0429 116/77 36.8 °C (98.2 °F) Temporal 100 24 95 % 87.2 kg (192 lb 3.9 oz)   10/05/24 0229 -- -- -- 98 -- 92 % --   10/05/24 0045 -- -- -- 100 -- 95 % --   10/05/24 0023 110/70 36.8 °C (98.2 °F) Temporal 102 20 96 % --   10/04/24 2200 -- -- -- 106 -- 98 % --   10/04/24 1943 126/72 36.9 °C (98.4 °F) Temporal (!) 124 18 97 % --   10/04/24 1629 123/73 35.9 °C (96.6 °F) Temporal (!) 114 18 94 % --   10/04/24 1200 -- -- -- 106 -- 96 % --   10/04/24 1055 92/65 36.1 °C (97 °F) Temporal 96 22 95 % --         Gen: NAD  Neck: symmetric, no mass  Cardiovascular: RRR  Respiratory: No distress   Extremities: no leg edema      Labs:  Lab Results   Component Value Date    WBC 10.9 10/05/2024    HGB 8.9 (L) 10/05/2024    HCT 30.1 (L) 10/05/2024    MCV 97 10/05/2024    PLT 89 (L) 10/05/2024     Lab Results   Component Value Date    GLUCOSE 300 (H) 10/05/2024    CALCIUM 8.3 (L) 10/05/2024     10/05/2024    K 4.1 10/05/2024    CO2 20 (L) 10/05/2024     (H) 10/05/2024    BUN 52 (H) 10/05/2024    CREATININE 2.92 (H) 10/05/2024   ESR: --  Lab Results   Component Value Date    SEDRATE 2 07/22/2024     Lab Results   Component Value Date    CRP 2.57 (H) 07/22/2024     Lab Results   Component Value Date    ALT 8 09/30/2024    AST 13 09/30/2024    GGT 63 (H) 01/07/2021    ALKPHOS 108 09/30/2024    BILITOT 0.6 09/30/2024         DATA:   Diagnostic tests reviewed for today's visit:    Labs this admission reviewed  Imagings this admission reviewed  Cultures: Reviewed      ASSESSMENT :   # Septic shock due to UTI - improved  # Hx of 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 Repeat cultures 10/1 NGTD.  -UA showing leukocyte esterase, nitrites, > 50 WBCs, and 1+ bacteria. Culture growing > 100,000 ESBL klebsiella  -Continue Ertapenem for 5 days total  - Closely monitor for antibiotics side effects including rash, Diarrhea/CDI, thrombocytopenia, JED, etc.      Jean Cason MD.   Infectious Disease Attending            This note was partially created using voice recognition software and is inherently subject to errors including those of syntax and \"sound-alike\" substitutions which may escape proofreading. In such instances, original meaning may be extrapolated by contextual derivation      "

## 2024-10-05 NOTE — CARE PLAN
Pt remained hds throughout the night. VS stable. Pt has not cognitively improved, pt drowsy. Unable to assess orientation and pt does not follow commands. Withdraws to pain when being turned and repositioned. Blood sugar order placed for q6h. Pt covered x2 with 6 units of insulin. Began having expiratory wheezes, SpO2 95-97% during the night. Teaching notified. Pt had multiple loose BM during the night. Corepack maintained at 73cm. Glucerna 1.5 running at 55mL/hr during the night. Safety maintained.    Problem: Pain - Adult  Goal: Verbalizes/displays adequate comfort level or baseline comfort level  10/5/2024 0639 by Hailey Manley RN  Outcome: Progressing  10/5/2024 0141 by Hailey Manley RN  Flowsheets (Taken 10/3/2024 2243)  Verbalizes/displays adequate comfort level or baseline comfort level:   Administer analgesics based on type and severity of pain and evaluate response   Assess pain using appropriate pain scale   Implement non-pharmacological measures as appropriate and evaluate response   Encourage patient to monitor pain and request assistance     Problem: Discharge Planning  Goal: Discharge to home or other facility with appropriate resources  10/5/2024 0639 by Hailey Manley RN  Outcome: Progressing  10/5/2024 0141 by Hailey Manley RN  Flowsheets (Taken 10/3/2024 2243)  Discharge to home or other facility with appropriate resources:   Identify barriers to discharge with patient and caregiver   Identify discharge learning needs (meds, wound care, etc)   Arrange for needed discharge resources and transportation as appropriate     Problem: Chronic Conditions and Co-morbidities  Goal: Patient's chronic conditions and co-morbidity symptoms are monitored and maintained or improved  10/5/2024 0639 by Hailey Manley RN  Outcome: Progressing  10/5/2024 0141 by Hailey Manley RN  Flowsheets (Taken 10/3/2024 2243)  Care Plan - Patient's Chronic Conditions and Co-Morbidity Symptoms are Monitored and  Maintained or Improved: Monitor and assess patient's chronic conditions and comorbid symptoms for stability, deterioration, or improvement     Problem: Fall/Injury  Goal: Verbalize understanding of personal risk factors for fall in the hospital  Outcome: Progressing  Goal: Verbalize understanding of risk factor reduction measures to prevent injury from fall in the home  Outcome: Progressing     Problem: Skin  Goal: Decreased wound size/increased tissue granulation at next dressing change  10/5/2024 0639 by Hailey Manley RN  Outcome: Progressing  10/5/2024 0517 by Hailey Manley RN  Flowsheets (Taken 10/3/2024 2243)  Decreased wound size/increased tissue granulation at next dressing change:   Promote sleep for wound healing   Protective dressings over bony prominences  10/5/2024 0141 by Hailey Manley RN  Flowsheets (Taken 10/3/2024 2243)  Decreased wound size/increased tissue granulation at next dressing change:   Promote sleep for wound healing   Protective dressings over bony prominences  Goal: Participates in plan/prevention/treatment measures  10/5/2024 0639 by Hailey Manley RN  Outcome: Progressing  10/5/2024 0517 by Hailey Manley RN  Flowsheets (Taken 10/3/2024 2243)  Participates in plan/prevention/treatment measures: Elevate heels  10/5/2024 0141 by Hailey Manley RN  Flowsheets (Taken 10/3/2024 2243)  Participates in plan/prevention/treatment measures: Elevate heels  Goal: Prevent/manage excess moisture  10/5/2024 0639 by Hailey Manley RN  Outcome: Progressing  10/5/2024 0517 by Hailey Manley RN  Flowsheets (Taken 10/3/2024 2243)  Prevent/manage excess moisture:   Cleanse incontinence/protect with barrier cream   Monitor for/manage infection if present   Moisturize dry skin  10/5/2024 0141 by Hailey Manley RN  Flowsheets (Taken 10/3/2024 2243)  Prevent/manage excess moisture:   Cleanse incontinence/protect with barrier cream   Monitor for/manage infection if present   Moisturize dry  skin  Goal: Prevent/minimize sheer/friction injuries  10/5/2024 0639 by Hailey Manley RN  Outcome: Progressing  10/5/2024 0517 by Hailey Manley RN  Flowsheets (Taken 10/3/2024 2243)  Prevent/minimize sheer/friction injuries:   Use pull sheet   Turn/reposition every 2 hours/use positioning/transfer devices  10/5/2024 0141 by Hailey Manley RN  Flowsheets (Taken 10/3/2024 2243)  Prevent/minimize sheer/friction injuries:   Use pull sheet   Turn/reposition every 2 hours/use positioning/transfer devices  Goal: Promote skin healing  10/5/2024 0639 by Hailey Manley RN  Outcome: Progressing  10/5/2024 0517 by Hailey Manley RN  Flowsheets (Taken 10/3/2024 2243)  Promote skin healing:   Protective dressings over bony prominences   Turn/reposition every 2 hours/use positioning/transfer devices  10/5/2024 0141 by Hailey Manley RN  Flowsheets (Taken 10/3/2024 2243)  Promote skin healing:   Protective dressings over bony prominences   Turn/reposition every 2 hours/use positioning/transfer devices     Problem: Perfusion/Cardiac  Goal: Adequate perfusion to organs/extremities  Outcome: Progressing  Goal: Hemodynamically stable  Outcome: Progressing  Goal: No cardiac arrhythmias  Outcome: Progressing     Problem: Diabetes  Goal: Maintain glucose levels >70mg/dl to <250mg/dl throughout shift  Outcome: Progressing     Problem: Nutrition  Goal: Less than 5 days NPO/clear liquids  Outcome: Progressing     Problem: Nutrition  Goal: Lab values WNL  Outcome: Progressing      The clinical goals for the shift include Pt will remain hds thorughout the shift.

## 2024-10-05 NOTE — CARE PLAN
The clinical goals for the shift include Pt will remain hds thorughout the shift.    Patient responsive to voice and pain, turned/repositioned throughout shift, oral care provided throughout shift, incontinent of urine and stool, TF as ordered, corpak intact, pt's son in to visit, update given  Problem: Pain - Adult  Goal: Verbalizes/displays adequate comfort level or baseline comfort level  Outcome: Progressing     Problem: Discharge Planning  Goal: Discharge to home or other facility with appropriate resources  Outcome: Progressing     Problem: Chronic Conditions and Co-morbidities  Goal: Patient's chronic conditions and co-morbidity symptoms are monitored and maintained or improved  Outcome: Progressing     Problem: Fall/Injury  Goal: Verbalize understanding of personal risk factors for fall in the hospital  Outcome: Progressing  Goal: Verbalize understanding of risk factor reduction measures to prevent injury from fall in the home  Outcome: Progressing

## 2024-10-06 VITALS
BODY MASS INDEX: 27.35 KG/M2 | RESPIRATION RATE: 20 BRPM | OXYGEN SATURATION: 95 % | SYSTOLIC BLOOD PRESSURE: 149 MMHG | DIASTOLIC BLOOD PRESSURE: 86 MMHG | TEMPERATURE: 97 F | HEIGHT: 71 IN | HEART RATE: 100 BPM | WEIGHT: 195.33 LBS

## 2024-10-06 LAB
ALBUMIN SERPL BCP-MCNC: 2.8 G/DL (ref 3.4–5)
ANION GAP SERPL CALC-SCNC: 14 MMOL/L (ref 10–20)
BACTERIA BLD CULT: NORMAL
BACTERIA BLD CULT: NORMAL
BUN SERPL-MCNC: 50 MG/DL (ref 6–23)
C DIF TOX TCDA+TCDB STL QL NAA+PROBE: NOT DETECTED
CALCIUM SERPL-MCNC: 8 MG/DL (ref 8.6–10.3)
CHLORIDE SERPL-SCNC: 111 MMOL/L (ref 98–107)
CO2 SERPL-SCNC: 24 MMOL/L (ref 21–32)
CREAT SERPL-MCNC: 2.16 MG/DL (ref 0.5–1.05)
EGFRCR SERPLBLD CKD-EPI 2021: 25 ML/MIN/1.73M*2
ERYTHROCYTE [DISTWIDTH] IN BLOOD BY AUTOMATED COUNT: 21.6 % (ref 11.5–14.5)
GLUCOSE BLD MANUAL STRIP-MCNC: 174 MG/DL (ref 74–99)
GLUCOSE BLD MANUAL STRIP-MCNC: 199 MG/DL (ref 74–99)
GLUCOSE BLD MANUAL STRIP-MCNC: 210 MG/DL (ref 74–99)
GLUCOSE BLD MANUAL STRIP-MCNC: 217 MG/DL (ref 74–99)
GLUCOSE BLD MANUAL STRIP-MCNC: 220 MG/DL (ref 74–99)
GLUCOSE SERPL-MCNC: 223 MG/DL (ref 74–99)
HCT VFR BLD AUTO: 27.1 % (ref 36–46)
HGB BLD-MCNC: 8.2 G/DL (ref 12–16)
MAGNESIUM SERPL-MCNC: 2.5 MG/DL (ref 1.6–2.4)
MCH RBC QN AUTO: 28.9 PG (ref 26–34)
MCHC RBC AUTO-ENTMCNC: 30.3 G/DL (ref 32–36)
MCV RBC AUTO: 95 FL (ref 80–100)
NRBC BLD-RTO: 4.3 /100 WBCS (ref 0–0)
PHOSPHATE SERPL-MCNC: 2.1 MG/DL (ref 2.5–4.9)
PLATELET # BLD AUTO: 72 X10*3/UL (ref 150–450)
POTASSIUM SERPL-SCNC: 3.9 MMOL/L (ref 3.5–5.3)
RBC # BLD AUTO: 2.84 X10*6/UL (ref 4–5.2)
SODIUM SERPL-SCNC: 145 MMOL/L (ref 136–145)
WBC # BLD AUTO: 11.8 X10*3/UL (ref 4.4–11.3)

## 2024-10-06 PROCEDURE — 2500000001 HC RX 250 WO HCPCS SELF ADMINISTERED DRUGS (ALT 637 FOR MEDICARE OP)

## 2024-10-06 PROCEDURE — 87506 IADNA-DNA/RNA PROBE TQ 6-11: CPT | Mod: STJLAB

## 2024-10-06 PROCEDURE — 2060000001 HC INTERMEDIATE ICU ROOM DAILY

## 2024-10-06 PROCEDURE — 99233 SBSQ HOSP IP/OBS HIGH 50: CPT | Performed by: STUDENT IN AN ORGANIZED HEALTH CARE EDUCATION/TRAINING PROGRAM

## 2024-10-06 PROCEDURE — 2500000002 HC RX 250 W HCPCS SELF ADMINISTERED DRUGS (ALT 637 FOR MEDICARE OP, ALT 636 FOR OP/ED)

## 2024-10-06 PROCEDURE — 2500000004 HC RX 250 GENERAL PHARMACY W/ HCPCS (ALT 636 FOR OP/ED)

## 2024-10-06 PROCEDURE — 2500000004 HC RX 250 GENERAL PHARMACY W/ HCPCS (ALT 636 FOR OP/ED): Performed by: INTERNAL MEDICINE

## 2024-10-06 PROCEDURE — 2500000004 HC RX 250 GENERAL PHARMACY W/ HCPCS (ALT 636 FOR OP/ED): Performed by: STUDENT IN AN ORGANIZED HEALTH CARE EDUCATION/TRAINING PROGRAM

## 2024-10-06 PROCEDURE — 36415 COLL VENOUS BLD VENIPUNCTURE: CPT

## 2024-10-06 PROCEDURE — 80069 RENAL FUNCTION PANEL: CPT

## 2024-10-06 PROCEDURE — 2500000002 HC RX 250 W HCPCS SELF ADMINISTERED DRUGS (ALT 637 FOR MEDICARE OP, ALT 636 FOR OP/ED): Performed by: STUDENT IN AN ORGANIZED HEALTH CARE EDUCATION/TRAINING PROGRAM

## 2024-10-06 PROCEDURE — 85027 COMPLETE CBC AUTOMATED: CPT

## 2024-10-06 PROCEDURE — 83735 ASSAY OF MAGNESIUM: CPT

## 2024-10-06 PROCEDURE — 87493 C DIFF AMPLIFIED PROBE: CPT | Mod: STJLAB

## 2024-10-06 PROCEDURE — 2500000001 HC RX 250 WO HCPCS SELF ADMINISTERED DRUGS (ALT 637 FOR MEDICARE OP): Performed by: STUDENT IN AN ORGANIZED HEALTH CARE EDUCATION/TRAINING PROGRAM

## 2024-10-06 PROCEDURE — 82947 ASSAY GLUCOSE BLOOD QUANT: CPT

## 2024-10-06 RX ORDER — INSULIN GLARGINE 100 [IU]/ML
8 INJECTION, SOLUTION SUBCUTANEOUS NIGHTLY
Status: DISPENSED | OUTPATIENT
Start: 2024-10-06

## 2024-10-06 RX ORDER — ESOMEPRAZOLE MAGNESIUM 40 MG/1
40 GRANULE, DELAYED RELEASE ORAL
Status: DISPENSED | OUTPATIENT
Start: 2024-10-07

## 2024-10-06 ASSESSMENT — PAIN SCALES - PAIN ASSESSMENT IN ADVANCED DEMENTIA (PAINAD)
TOTALSCORE: 0
CONSOLABILITY: NO NEED TO CONSOLE
TOTALSCORE: 0
NEGVOCALIZATION: OCCASIONAL MOAN/GROAN, LOW SPEECH, NEGATIVE/DISAPPROVING QUALITY
BREATHING: NORMAL
FACIALEXPRESSION: SMILING OR INEXPRESSIVE
BODYLANGUAGE: RELAXED
FACIALEXPRESSION: SMILING OR INEXPRESSIVE
BODYLANGUAGE: RELAXED
NEGVOCALIZATION: OCCASIONAL MOAN/GROAN, LOW SPEECH, NEGATIVE/DISAPPROVING QUALITY
BREATHING: NORMAL
CONSOLABILITY: NO NEED TO CONSOLE
BREATHING: NORMAL
FACIALEXPRESSION: SMILING OR INEXPRESSIVE
TOTALSCORE: 0
BODYLANGUAGE: RELAXED
TOTALSCORE: REPOSITIONED;OTHER (COMMENT)
TOTALSCORE: REPOSITIONED
FACIALEXPRESSION: SMILING OR INEXPRESSIVE
TOTALSCORE: REPOSITIONED
BREATHING: NORMAL
BODYLANGUAGE: RELAXED
TOTALSCORE: 1
BREATHING: NORMAL
CONSOLABILITY: NO NEED TO CONSOLE
TOTALSCORE: 2
BODYLANGUAGE: RELAXED
FACIALEXPRESSION: SMILING OR INEXPRESSIVE
FACIALEXPRESSION: SMILING OR INEXPRESSIVE
BODYLANGUAGE: RELAXED
BREATHING: NORMAL
TOTALSCORE: REPOSITIONED
CONSOLABILITY: NO NEED TO CONSOLE
CONSOLABILITY: DISTRACTED OR REASSURED BY VOICE/TOUCH
TOTALSCORE: 0
CONSOLABILITY: NO NEED TO CONSOLE

## 2024-10-06 ASSESSMENT — PAIN - FUNCTIONAL ASSESSMENT
PAIN_FUNCTIONAL_ASSESSMENT: 0-10
PAIN_FUNCTIONAL_ASSESSMENT: PAINAD (PAIN ASSESSMENT IN ADVANCED DEMENTIA SCALE)
PAIN_FUNCTIONAL_ASSESSMENT: PAINAD (PAIN ASSESSMENT IN ADVANCED DEMENTIA SCALE)
PAIN_FUNCTIONAL_ASSESSMENT: 0-10

## 2024-10-06 ASSESSMENT — COGNITIVE AND FUNCTIONAL STATUS - GENERAL
STANDING UP FROM CHAIR USING ARMS: TOTAL
DAILY ACTIVITIY SCORE: 6
PERSONAL GROOMING: TOTAL
TURNING FROM BACK TO SIDE WHILE IN FLAT BAD: TOTAL
WALKING IN HOSPITAL ROOM: TOTAL
DRESSING REGULAR LOWER BODY CLOTHING: TOTAL
MOVING TO AND FROM BED TO CHAIR: TOTAL
HELP NEEDED FOR BATHING: TOTAL
EATING MEALS: TOTAL
DRESSING REGULAR UPPER BODY CLOTHING: TOTAL
TOILETING: TOTAL
MOVING FROM LYING ON BACK TO SITTING ON SIDE OF FLAT BED WITH BEDRAILS: TOTAL
MOBILITY SCORE: 6
CLIMB 3 TO 5 STEPS WITH RAILING: TOTAL

## 2024-10-06 ASSESSMENT — PAIN SCALES - GENERAL
PAINLEVEL_OUTOF10: 0 - NO PAIN

## 2024-10-06 NOTE — PROGRESS NOTES
"Bing Holliday is a 63 y.o. female on day 6 of admission presenting with Sepsis, due to unspecified organism, unspecified whether acute organ dysfunction present (Multi).    Subjective   Patient seen and evaluated at bedside this morning. She is lethargic , opening her eyes to command and responding to pain. The dose of hydrocortisone increased to 50 q6hr from q8hr and got IV fluid yesterday. The creatinine has improved to 2.16 from 2.53 after patient received iv fluid.     Objective     Physical Exam  Vitals reviewed.   Constitutional:       General: She is not in acute distress.     Appearance: She is not ill-appearing.   HENT:      Head: Normocephalic and atraumatic.      Mouth/Throat:      Mouth: Mucous membranes are dry.   Eyes:      Pupils: Pupils are equal, round, and reactive to light.   Cardiovascular:      Rate and Rhythm: Tachycardia present. Rhythm irregular.      Pulses: Normal pulses.   Pulmonary:      Effort: Pulmonary effort is normal. No respiratory distress.      Breath sounds: No wheezing, rhonchi or rales.   Abdominal:      General: Abdomen is flat. There is no distension.      Palpations: Abdomen is soft.      Tenderness: There is no abdominal tenderness.   Musculoskeletal:      Right lower leg: No edema.      Left lower leg: No edema.   Skin:     General: Skin is warm and dry.   Neurological:      Mental Status: She is lethargic.      GCS: GCS eye subscore is 3. GCS verbal subscore is 2. GCS motor subscore is 5.         Last Recorded Vitals  Blood pressure 135/86, pulse 108, temperature 36.2 °C (97.2 °F), temperature source Temporal, resp. rate 20, height 1.803 m (5' 11\"), weight 88.6 kg (195 lb 5.2 oz), SpO2 96%.  Intake/Output last 3 Shifts:  I/O last 3 completed shifts:  In: 1205 (14.2 mL/kg) [NG/GT:1205]  Out: 750 (8.9 mL/kg) [Urine:750 (0.2 mL/kg/hr)]  Dosing Weight: 84.7 kg     Relevant Results                          Malnutrition Diagnosis Status: Ongoing  Malnutrition Diagnosis: " Moderate malnutrition related to chronic disease or condition  As Evidenced by: recurrent adrenal crisis with resulting inconsistent oral intakes due to altered mentation, 9.6% weight loss x3 months with moderate fat and muscle mass wasting.  I agree with the dietitian's malnutrition diagnosis.      Assessment/Plan   Assessment & Plan  Sepsis, due to unspecified organism, unspecified whether acute organ dysfunction present (Multi)    Lupus (systemic lupus erythematosus) (Multi)    Encephalopathy acute    Adrenal insufficiency (Multi)    Acute kidney injury (CMS-HCC)    Bing Holliday is a 63-year-old female transferred from ICU to step-down unit for further management of acute metabolic encephalopathy secondary to UTI and complicated by adrenal insufficiency.       #Acute metabolic encephalopathy 2/2 UTI and c/b adrenal insufficiency   #Lupus cerebritis  - Patient treated for septic shock in the ICU with pressors and Vancomycin and Rocephin for UTI.  Weaned off of pressors on 10/1 at 9pm with BP stable since.  Shock was complicated by adrenal insufficiency that was likely worsening in the setting of UTI.  Patient's shock has resolved.  AMS was improving on stress-dose steroids. Patient back to stress dose steroids.  - Urine culture grew ESBL Klebsiella pneumoniae/variicola.  Rocephin was discontinued and Ertapenem was started based on sensitivities and ID recs.   - She is normally on insulin at home (Lantus 10 units AM and sliding scale).  Originally was hypoglycemic at presentation with glucose 43 but hypoglycemia has resolved since.    - Lupus cerebritis has historically been controlled on CellCept and Cortef 10 mg AM/20 mg PM, fludrocortisone 0.1 mg every other day  - Patient had one positive blood culture growing Staph aureus/epidermidis/pettenkoferi.  Likely a contaminant; repeat cultures no growth   -Continue risperidone (in place of home Invega)     Plan:   - Will continue ertapenem per ID recs   - Will  continue with stress-dose Solu-Cortef 50 mg q6h  - Hold home CellCept in the setting of infection   - NG tube for home meds.  Can remove once mentation improves and patient passes bedside swallow eval   - Patient was evaluated by speech but swallow study unable to be completed due to patient's decreased mentation.    - Will continue nutrition/medication via NG tube for now and reassess when mentation improves      #Afib with RVR  - tachycardic overnight and this AM, HR in the range of late 90s to early 100s.  Otherwise hemodynamically stable.      Plan:  Home Metoprolol tartrate 25 mg BID restarted     #Acute on Chronic Anemia  #Anemia of Chronic Disease  #Chronic Pancytopenia  - Hemoglobin dropped from 7.6 to 6.8 on 10/2.  Patient received 1 unit PRBC.  No signs of bleeding on exam.  Hgb now stable.    -Pancytopenia at baseline, but slightly worse in setting of septic shock.    Plan:   - Continue to trend with daily CBC     #Hypoglycemia? 2/2 poor oral intake -resolved   #Uncontrolled IDDM2  -Hx of labile sugars; insulin held due to hypoglycemia concerns with poor oral intake requiring NG tube.  Glucose is stable.      Plan:  -Accuchecks     #JED on CKD 3  - Cr trending down at 2.16 today (baseline 1.4 -1.9).  Etiology likely prerenal secondary to decreased perfusion from septic shock requiring pressors vs intrinsic renal.  Patient received 1L of LR and creatinine is down to 2.16 from 2.53. Obstructive etiology was ruled out with kidney US which did not show hydronephrosis or nephrolithiasis and bladder scan not suggestive of retention.    - Will get daily RFP   Plan:   - Trend Cr with daily RFP  - Continue water flushes to 200 q4H  -IVF bolus      Diarrhea - improved  -likely abx-related. Stool studies pending.     Chronic problems:   #Afib (on Heparin due to thrombocytopenia from Eliquis)  #CKD3  #HFpEF  #COPD  #GERD  #CAD  - continue home medications as appropriate (atorvastatin 40 mg, Keppra 500 mgm BID, Zoloft  25 mg)         Attending addendum:  My edits are italicized.   Patient seen, examined, and discussed with Dr. Sage.  No acute events overnight.  Stool output has decreased with 1 loose stool charted overnight.  Patient actually appears to be slightly improved since yesterday.  She responds to voice and is able to follow commands and mouth responses to questions.  She is still very lethargic.  Vital signs overall stable.  She remains slightly tachycardic.  Slightly hypertensive.  On exam, heart is irregularly irregular.  Lungs with rhonchorous breath sounds and transmitted upper airway noises but no appreciable crackles or wheezes anteriorly or laterally.  Exam otherwise stable.  Labs this morning with improvement in BUN/SCR 50/2.16.  White count 11.8.  Chronic anemia and chronic thrombocytopenia.  Patient is still hyperglycemic to 200s.    Patient has appeared to improved slightly since yesterday.  Encephalopathy likely multifactorial in the setting of adrenal insufficiency, dehydration, mild uremia, acute illness.  Will continue stress dose steroids today, and hopefully try to wean tomorrow pending improvement in symptoms.  Patient appears to have improved significantly following IVF bolus with improvement in kidney function, so we will provide an additional bolus today.  After today's IV fluids, plan to hydrate by free water flushes alone.  She will complete her course of ertapenem tomorrow.  Continue tube feeds as directed by nutrition.  Rest of plan as outlined above.  Prognosis is extremely guarded.  I discussed poor prognosis with patient's son yesterday.  We agreed to see how patient does over the weekend and readdress goals of care on Monday.

## 2024-10-06 NOTE — PROGRESS NOTES
Speech-Language Pathology                 Therapy Communication Note    Patient Name: Bing Holliday  MRN: [id number]  Department: [facility]  Room: 2107/2107-A  Today's Date: 10/6/2024     Discipline: Speech Language Pathology    Missed Time: Attempt    Comment: Attempted dysphagia treatment. Patient was not awake or able to be roused. ST to attempt again as schedule permits and patient is appropriate.

## 2024-10-06 NOTE — CARE PLAN
The clinical goals for the shift include The patient will remain hemodynamically stable, vital signs withing normal range and afebrile. Incidents of diarrhea stool with decrease by 10/6 0700      Problem: Chronic Conditions and Co-morbidities  Goal: Patient's chronic conditions and co-morbidity symptoms are monitored and maintained or improved  Outcome: Progressing     Problem: Skin  Goal: Decreased wound size/increased tissue granulation at next dressing change  Outcome: Progressing     Problem: Skin  Goal: Prevent/manage excess moisture  Outcome: Progressing     Problem: Skin  Goal: Prevent/minimize sheer/friction injuries  Outcome: Progressing     Problem: Skin  Goal: Promote skin healing  Outcome: Progressing     Problem: Perfusion/Cardiac  Goal: Hemodynamically stable  Outcome: Progressing     Problem: Diabetes  Goal: Maintain glucose levels >70mg/dl to <250mg/dl throughout shift  Outcome: Progressing     Problem: Nutrition  Goal: Electrolytes WNL  Outcome: Progressing   The patient was very lethargic and nonverbal. Her eyes were shut and did not obey commands. At the end of the shift she opened her eyes more, but was still nonverbal and unable to obey commands. She required total care. She remained incontinent of frequent loose, watery brown stool. Rule out C-Diff precautions in effect.

## 2024-10-07 LAB
ALBUMIN SERPL BCP-MCNC: 2.8 G/DL (ref 3.4–5)
ANION GAP SERPL CALC-SCNC: 11 MMOL/L (ref 10–20)
BUN SERPL-MCNC: 48 MG/DL (ref 6–23)
CALCIUM SERPL-MCNC: 7.8 MG/DL (ref 8.6–10.3)
CHLORIDE SERPL-SCNC: 112 MMOL/L (ref 98–107)
CO2 SERPL-SCNC: 26 MMOL/L (ref 21–32)
CREAT SERPL-MCNC: 1.69 MG/DL (ref 0.5–1.05)
EGFRCR SERPLBLD CKD-EPI 2021: 34 ML/MIN/1.73M*2
ERYTHROCYTE [DISTWIDTH] IN BLOOD BY AUTOMATED COUNT: 21.6 % (ref 11.5–14.5)
GLUCOSE BLD MANUAL STRIP-MCNC: 195 MG/DL (ref 74–99)
GLUCOSE BLD MANUAL STRIP-MCNC: 200 MG/DL (ref 74–99)
GLUCOSE BLD MANUAL STRIP-MCNC: 228 MG/DL (ref 74–99)
GLUCOSE BLD MANUAL STRIP-MCNC: 231 MG/DL (ref 74–99)
GLUCOSE BLD MANUAL STRIP-MCNC: 252 MG/DL (ref 74–99)
GLUCOSE SERPL-MCNC: 228 MG/DL (ref 74–99)
HCT VFR BLD AUTO: 28.3 % (ref 36–46)
HGB BLD-MCNC: 8.5 G/DL (ref 12–16)
MAGNESIUM SERPL-MCNC: 2.33 MG/DL (ref 1.6–2.4)
MCH RBC QN AUTO: 28.8 PG (ref 26–34)
MCHC RBC AUTO-ENTMCNC: 30 G/DL (ref 32–36)
MCV RBC AUTO: 96 FL (ref 80–100)
NRBC BLD-RTO: 6.3 /100 WBCS (ref 0–0)
PHOSPHATE SERPL-MCNC: 2 MG/DL (ref 2.5–4.9)
PLATELET # BLD AUTO: 79 X10*3/UL (ref 150–450)
POTASSIUM SERPL-SCNC: 3.3 MMOL/L (ref 3.5–5.3)
RBC # BLD AUTO: 2.95 X10*6/UL (ref 4–5.2)
SODIUM SERPL-SCNC: 146 MMOL/L (ref 136–145)
WBC # BLD AUTO: 13.1 X10*3/UL (ref 4.4–11.3)

## 2024-10-07 PROCEDURE — 2500000004 HC RX 250 GENERAL PHARMACY W/ HCPCS (ALT 636 FOR OP/ED)

## 2024-10-07 PROCEDURE — 99233 SBSQ HOSP IP/OBS HIGH 50: CPT

## 2024-10-07 PROCEDURE — 99233 SBSQ HOSP IP/OBS HIGH 50: CPT | Performed by: STUDENT IN AN ORGANIZED HEALTH CARE EDUCATION/TRAINING PROGRAM

## 2024-10-07 PROCEDURE — 2500000002 HC RX 250 W HCPCS SELF ADMINISTERED DRUGS (ALT 637 FOR MEDICARE OP, ALT 636 FOR OP/ED)

## 2024-10-07 PROCEDURE — 2500000001 HC RX 250 WO HCPCS SELF ADMINISTERED DRUGS (ALT 637 FOR MEDICARE OP)

## 2024-10-07 PROCEDURE — 92526 ORAL FUNCTION THERAPY: CPT | Mod: GN | Performed by: SPEECH-LANGUAGE PATHOLOGIST

## 2024-10-07 PROCEDURE — 36415 COLL VENOUS BLD VENIPUNCTURE: CPT

## 2024-10-07 PROCEDURE — 82947 ASSAY GLUCOSE BLOOD QUANT: CPT

## 2024-10-07 PROCEDURE — 2500000004 HC RX 250 GENERAL PHARMACY W/ HCPCS (ALT 636 FOR OP/ED): Performed by: INTERNAL MEDICINE

## 2024-10-07 PROCEDURE — 94640 AIRWAY INHALATION TREATMENT: CPT

## 2024-10-07 PROCEDURE — 80069 RENAL FUNCTION PANEL: CPT

## 2024-10-07 PROCEDURE — 83735 ASSAY OF MAGNESIUM: CPT

## 2024-10-07 PROCEDURE — 2060000001 HC INTERMEDIATE ICU ROOM DAILY

## 2024-10-07 PROCEDURE — 2500000001 HC RX 250 WO HCPCS SELF ADMINISTERED DRUGS (ALT 637 FOR MEDICARE OP): Performed by: STUDENT IN AN ORGANIZED HEALTH CARE EDUCATION/TRAINING PROGRAM

## 2024-10-07 PROCEDURE — 85027 COMPLETE CBC AUTOMATED: CPT

## 2024-10-07 PROCEDURE — 2500000002 HC RX 250 W HCPCS SELF ADMINISTERED DRUGS (ALT 637 FOR MEDICARE OP, ALT 636 FOR OP/ED): Performed by: STUDENT IN AN ORGANIZED HEALTH CARE EDUCATION/TRAINING PROGRAM

## 2024-10-07 RX ORDER — POTASSIUM PHOSPHATE, MONOBASIC AND POTASSIUM PHOSPHATE, DIBASIC 224; 236 MG/ML; MG/ML
21 INJECTION, SOLUTION INTRAVENOUS ONCE
Status: DISCONTINUED | OUTPATIENT
Start: 2024-10-07 | End: 2024-10-07

## 2024-10-07 RX ORDER — LOPERAMIDE HYDROCHLORIDE 2 MG/1
2 CAPSULE ORAL ONCE
Status: COMPLETED | OUTPATIENT
Start: 2024-10-07 | End: 2024-10-07

## 2024-10-07 RX ORDER — LOPERAMIDE HYDROCHLORIDE 2 MG/1
2 CAPSULE ORAL 2 TIMES DAILY PRN
Status: DISCONTINUED | OUTPATIENT
Start: 2024-10-07 | End: 2024-10-11 | Stop reason: HOSPADM

## 2024-10-07 RX ORDER — ERTAPENEM 1 G/1
1 INJECTION, POWDER, LYOPHILIZED, FOR SOLUTION INTRAMUSCULAR; INTRAVENOUS EVERY 24 HOURS
Status: COMPLETED | OUTPATIENT
Start: 2024-10-07 | End: 2024-10-07

## 2024-10-07 ASSESSMENT — COGNITIVE AND FUNCTIONAL STATUS - GENERAL
DAILY ACTIVITIY SCORE: 6
TOILETING: TOTAL
DRESSING REGULAR UPPER BODY CLOTHING: TOTAL
EATING MEALS: TOTAL
HELP NEEDED FOR BATHING: TOTAL
TURNING FROM BACK TO SIDE WHILE IN FLAT BAD: TOTAL
MOBILITY SCORE: 6
PERSONAL GROOMING: TOTAL
WALKING IN HOSPITAL ROOM: TOTAL
DRESSING REGULAR LOWER BODY CLOTHING: TOTAL
STANDING UP FROM CHAIR USING ARMS: TOTAL
CLIMB 3 TO 5 STEPS WITH RAILING: TOTAL
MOVING TO AND FROM BED TO CHAIR: TOTAL
MOVING FROM LYING ON BACK TO SITTING ON SIDE OF FLAT BED WITH BEDRAILS: TOTAL

## 2024-10-07 ASSESSMENT — PAIN - FUNCTIONAL ASSESSMENT
PAIN_FUNCTIONAL_ASSESSMENT: 0-10
PAIN_FUNCTIONAL_ASSESSMENT: PAINAD (PAIN ASSESSMENT IN ADVANCED DEMENTIA SCALE)
PAIN_FUNCTIONAL_ASSESSMENT: 0-10

## 2024-10-07 ASSESSMENT — PAIN SCALES - PAIN ASSESSMENT IN ADVANCED DEMENTIA (PAINAD)
BREATHING: NORMAL
BODYLANGUAGE: RELAXED
TOTALSCORE: 0
TOTALSCORE: 0
CONSOLABILITY: NO NEED TO CONSOLE
FACIALEXPRESSION: SMILING OR INEXPRESSIVE
TOTALSCORE: 0
FACIALEXPRESSION: SMILING OR INEXPRESSIVE
BREATHING: NORMAL
BODYLANGUAGE: RELAXED
BODYLANGUAGE: RELAXED
BREATHING: NORMAL
CONSOLABILITY: NO NEED TO CONSOLE
BREATHING: NORMAL
CONSOLABILITY: NO NEED TO CONSOLE
FACIALEXPRESSION: SMILING OR INEXPRESSIVE
BODYLANGUAGE: RELAXED
FACIALEXPRESSION: SMILING OR INEXPRESSIVE
TOTALSCORE: 0
CONSOLABILITY: NO NEED TO CONSOLE
TOTALSCORE: REPOSITIONED

## 2024-10-07 ASSESSMENT — PAIN SCALES - GENERAL
PAINLEVEL_OUTOF10: 0 - NO PAIN

## 2024-10-07 ASSESSMENT — PAIN SCALES - WONG BAKER: WONGBAKER_NUMERICALRESPONSE: NO HURT

## 2024-10-07 NOTE — PROGRESS NOTES
"Bing Holliday is a 63 y.o. female on day 7 of admission presenting with Sepsis, due to unspecified organism, unspecified whether acute organ dysfunction present (Multi).    Subjective   Symptoms (0 - 10, Best to Worst)  [place] Symptom Assessment System  0-10 (Numeric) Pain Score: 0 - No pain         Objective     Physical Exam  Constitutional:       Appearance: She is ill-appearing.   HENT:      Head: Normocephalic.      Right Ear: External ear normal.      Left Ear: External ear normal.      Nose: Nose normal.      Mouth/Throat:      Mouth: Mucous membranes are dry.   Eyes:      Conjunctiva/sclera: Conjunctivae normal.   Cardiovascular:      Pulses: Normal pulses.   Pulmonary:      Effort: Pulmonary effort is normal.   Abdominal:      General: There is distension.      Palpations: Abdomen is soft.   Musculoskeletal:         General: Deformity present.      Cervical back: Neck supple.   Skin:     General: Skin is dry.      Capillary Refill: Capillary refill takes 2 to 3 seconds.   Neurological:      Mental Status: She is alert.      Motor: Weakness present.         Last Recorded Vitals  Blood pressure (!) 149/94, pulse 96, temperature 36.5 °C (97.7 °F), temperature source Temporal, resp. rate 20, height 1.803 m (5' 11\"), weight 88.7 kg (195 lb 8.8 oz), SpO2 97%.  Intake/Output last 3 Shifts:  I/O last 3 completed shifts:  In: 1205 (14.2 mL/kg) [NG/GT:1205]  Out: 850 (10 mL/kg) [Urine:850 (0.3 mL/kg/hr)]  Dosing Weight: 84.7 kg     Relevant Results  atorvastatin, 40 mg, nasogastric tube, Nightly  [Held by provider] budesonide, 0.5 mg, nebulization, BID  esomeprazole, 40 mg, nasogastric tube, Daily before breakfast  influenza, 0.5 mL, intramuscular, During hospitalization  [Held by provider] furosemide, 40 mg, oral, Daily  heparin (porcine), 5,000 Units, subcutaneous, q8h  hydrocortisone sodium succinate, 50 mg, intravenous, q8h  insulin glargine, 8 Units, subcutaneous, Nightly  insulin lispro, 0-10 Units, " subcutaneous, q6h  levETIRAcetam, 500 mg, nasogastric tube, BID  metoprolol tartrate, 25 mg, nasogastric tube, BID  [Held by provider] polyethylene glycol, 17 g, oral, Daily  risperiDONE, 1 mg, nasogastric tube, BID  sertraline, 25 mg, nasogastric tube, Daily       Results for orders placed or performed during the hospital encounter of 09/30/24 (from the past 24 hour(s))   POCT GLUCOSE   Result Value Ref Range    POCT Glucose 217 (H) 74 - 99 mg/dL   POCT GLUCOSE   Result Value Ref Range    POCT Glucose 195 (H) 74 - 99 mg/dL   Renal Function Panel   Result Value Ref Range    Glucose 228 (H) 74 - 99 mg/dL    Sodium 146 (H) 136 - 145 mmol/L    Potassium 3.3 (L) 3.5 - 5.3 mmol/L    Chloride 112 (H) 98 - 107 mmol/L    Bicarbonate 26 21 - 32 mmol/L    Anion Gap 11 10 - 20 mmol/L    Urea Nitrogen 48 (H) 6 - 23 mg/dL    Creatinine 1.69 (H) 0.50 - 1.05 mg/dL    eGFR 34 (L) >60 mL/min/1.73m*2    Calcium 7.8 (L) 8.6 - 10.3 mg/dL    Phosphorus 2.0 (L) 2.5 - 4.9 mg/dL    Albumin 2.8 (L) 3.4 - 5.0 g/dL   Magnesium   Result Value Ref Range    Magnesium 2.33 1.60 - 2.40 mg/dL   CBC   Result Value Ref Range    WBC 13.1 (H) 4.4 - 11.3 x10*3/uL    nRBC 6.3 (H) 0.0 - 0.0 /100 WBCs    RBC 2.95 (L) 4.00 - 5.20 x10*6/uL    Hemoglobin 8.5 (L) 12.0 - 16.0 g/dL    Hematocrit 28.3 (L) 36.0 - 46.0 %    MCV 96 80 - 100 fL    MCH 28.8 26.0 - 34.0 pg    MCHC 30.0 (L) 32.0 - 36.0 g/dL    RDW 21.6 (H) 11.5 - 14.5 %    Platelets 79 (L) 150 - 450 x10*3/uL   POCT GLUCOSE   Result Value Ref Range    POCT Glucose 228 (H) 74 - 99 mg/dL   POCT GLUCOSE   Result Value Ref Range    POCT Glucose 252 (H) 74 - 99 mg/dL   POCT GLUCOSE   Result Value Ref Range    POCT Glucose 200 (H) 74 - 99 mg/dL     *Note: Due to a large number of results and/or encounters for the requested time period, some results have not been displayed. A complete set of results can be found in Results Review.    XR abdomen 1 view    Result Date: 10/4/2024  Interpreted By:  Roni Dwyer,  STUDY: XR ABDOMEN 1 VIEW;  10/3/2024 6:41 pm   INDICATION: Signs/Symptoms:NG tube placement.   COMPARISON: None.   ACCESSION NUMBER(S): [id number]   ORDERING CLINICIAN: FREDO EVANS   FINDINGS: Feeding tube tip near the gastroduodenal junction. Nonobstructive bowel gas pattern.       Feeding tube tip near the gastroduodenal junction.   MACRO: None   Signed by: Roni Dwyer 10/4/2024 8:25 AM Dictation workstation:   [id number]    US renal complete    Result Date: 10/4/2024  Interpreted By:  Tani Navarro, STUDY: US RENAL COMPLETE;  10/3/2024 9:00 pm   INDICATION: Signs/Symptoms:to assess for post renal JED.   COMPARISON: CT abdomen and pelvis 06/28/2024   ACCESSION NUMBER(S): [id number]   ORDERING CLINICIAN: ANA MONTERROSO   TECHNIQUE: Grayscale and color Doppler sonographic images of the kidneys.   FINDINGS: Evaluation limited due to bowel gas, limited acoustic windows, and difficulty with patient positioning.   RIGHT KIDNEY:  Measures 9.7 cm in length. No hydronephrosis. No calculus or perinephric fluid.   LEFT KIDNEY:  Measures 9.2 cm in length. No hydronephrosis. No calculus or perinephric fluid.   URINARY BLADDER: Partially distended. Normal.   Small volume free fluid in the pelvis.       Echogenic appearance of the renal parenchyma likely reflecting medical renal disease. No nephrolithiasis or hydronephrosis.   Small volume free fluid in the pelvis.   MACRO: None   Signed by: Tani Navarro 10/4/2024 1:12 AM Dictation workstation:   [id number]    ECG 12 lead    Result Date: 10/2/2024  Atrial fibrillation ST & T wave abnormality, consider anterolateral ischemia or digitalis effect Prolonged QT Abnormal ECG When compared with ECG of 30-SEP-2024 09:12, (unconfirmed) No significant change was found Confirmed by Mario Alberto Hopson ([id number]) on 10/2/2024 8:48:20 AM    ECG 12 lead    Result Date: 10/2/2024  Atrial fibrillation Minimal voltage criteria for LVH, may be normal variant ST & T wave abnormality,  consider inferolateral ischemia or digitalis effect Prolonged QT Abnormal ECG When compared with ECG of 31-JUL-2024 13:27, No significant change was found BASELINE ARTIFACT Confirmed by Mario Alberto Hopson ([id number]) on 10/2/2024 8:19:41 AM    XR abdomen 1 view    Result Date: 10/2/2024  Interpreted By:  Tani Navarro, STUDY: XR ABDOMEN 1 VIEW; ;  10/2/2024 6:11 am   INDICATION: Signs/Symptoms:OG repositioning.   COMPARISON: Chest/abdominal radiograph 10/01/2024   ACCESSION NUMBER(S): [id number]   ORDERING CLINICIAN: FREDO EVANS   FINDINGS: Enteric tube side port and tip project below the gastroesophageal junction, in the gastric body. Limited field of view of the abdomen is unremarkable with nonobstructive bowel-gas pattern.   Severe osteoarthritis of the left hip. Right hip arthroplasty.       Enteric tube side port and tip project below the gastroesophageal junction, in the gastric body.   MACRO: None   Signed by: Tani Navarro 10/2/2024 7:00 AM Dictation workstation:   [id number]    XR abdomen 1 view    Result Date: 10/1/2024  Interpreted By:  Hay Leahy, STUDY: XR ABDOMEN 1 VIEW;  10/1/2024 9:50 pm   INDICATION: Signs/Symptoms:NG tube placement.     COMPARISON: 07/16/2024   ACCESSION NUMBER(S): [id number]   ORDERING CLINICIAN: FREDO EVANS   FINDINGS: There is a nasogastric tube noted ending in the upper abdomen in satisfactory position.       1.  There is a nasogastric tube noted ending in the upper abdomen in satisfactory position.   MACRO: None   Signed by: Hay Leahy 10/1/2024 10:15 PM Dictation workstation:   [id number]    XR chest 1 view    Result Date: 9/30/2024  STUDY: Chest Radiograph;  9/30/2024 at 9:51 AM. INDICATION: Altered mental status. COMPARISON: XR chest 7/17/2024, 7/15/2024; CT CAP 7/15/2024, 6/28/2024. ACCESSION NUMBER(S): [id number] ORDERING CLINICIAN: ASHUTOSH GIVENS TECHNIQUE:  Frontal chest was obtained at 09:51 hours. FINDINGS: CARDIOMEDIASTINAL SILHOUETTE: Heart  is mildly enlarged with left-sided cardiac pacemaker.  LUNGS: Lungs are clear.  ABDOMEN: No remarkable upper abdominal findings.  BONES: No acute osseous changes.  Severe degenerative change of the left shoulder.    No acute process. Signed by López Duke MD    CT head wo IV contrast    Result Date: 9/30/2024  Interpreted By:  Kenton Lomax, STUDY: CT HEAD WO IV CONTRAST; 9/30/2024 10:13 am   INDICATION: Altered mental status.   COMPARISON: 07/28/2020   ACCESSION NUMBER(S): [id number]   ORDERING CLINICIAN: ASHUTOSH GIVENS   TECHNIQUE: CT axial images through the Brain were obtained without contrast.   FINDINGS: There is no mass effect, hemorrhage, or infarct. Redemonstration of zone of encephalomalacia reflecting chronic infarct in left occipital lobe. Very small subtle encephalomalacia in high left parietal lobe, also redemonstrated. Scattered lucencies in deep cerebral white matter, are nonspecific, likely on basis of chronic microvascular ischemic changes.  The visualized paranasal sinuses are clear.       No acute finding. Redemonstration of old infarct left occipital lobe and small old infarct left parietal lobe. Presumed chronic microvascular ischemic changes deep cerebral white matter.   Signed by: Kenton Lomax 9/30/2024 10:37 AM Dictation workstation:   [id number]             Assessment/Plan   IMP:  Met with patient and performed physical exam. Patient answers questions appropriately but appears very tired. She denies pain.   Called her son to discuss her small improvements and her overall prognosis. Talked at length regarding her change in baseline ad her overall prognosis and quality of life. We discussed Hospice as an option of care and the benefits it may provide. There concern that she may be aspirating as she had been unable to pass a swallow test however upon writing this note- the patient has become more alert over the course of the day and is now managing liquids without difficulty.   Son  wishes to give her a couple of more days in the hospital and if she does not improve or her condition deteriorates- he will consider Hospice. If she improves he would want her to return to SNF.  Updated Pallitive team and PCD.        Provider estimate of survival: unknown  Patient Prognostic Awareness: Unknown    Patient/proxy preference for information  Prefers full information    Goals of Care  Good communication  Safe discharge    Is the patient hospice-eligible?   Yes  Was a discussion held re hospice services?   yes  Was a decision made re hospice services?  No, not at this time.     Other Palliative Support  Emotional support in navigating this complex chronic illness.        I spent 60 minutes in the review, planning and care of this patient.       Molly Chavez, APRN-CNS

## 2024-10-07 NOTE — PROGRESS NOTES
10/07/24 1622   Discharge Planning   Home or Post Acute Services Post acute facilities (Rehab/SNF/etc)   Type of Post Acute Facility Services Skilled nursing   Expected Discharge Disposition SNF  ([facility])     Per note from the [facility] in [facility], they started precert today.

## 2024-10-07 NOTE — PROGRESS NOTES
PHARMACIST CONSULT  RENAL DOSING ADJUSTMENT    Patient Name: Bing Holliday  MRN: [id number]  Admission Date: 9/30/2024  9:01 AM  Date/Time of Consult: 10/07/24 at 8:34 AM    In accordance with the inpatient pharmacy renal dose adjustment consult agreement the following medication changes have been made:  Antimicrobial: currently ordered 500 mg q24hr, will be adjusted to 1g q24hr      Results from last 72 hours   Lab Units 10/07/24  0419 10/06/24  0425 10/05/24  1325   CREATININE mg/dL 1.69* 2.16* 2.53*   BUN mg/dL 48* 50* 50*       Serum creatinine: 1.69 mg/dL (H) 10/07/24 0419  Estimated creatinine clearance: 42.5 mL/min (A)      Per consult agreement, pharmacy will order related labs, evaluate results, and adjust dosing as it pertains to the drug therapy being managed.  Thank you for allowing me to participate in the care for this patient.    Signature: Veronica Pérez  10/07/24 at 8:34 AM

## 2024-10-07 NOTE — CARE PLAN
The patient's goals for the shift include     Problem: Pain - Adult  Goal: Verbalizes/displays adequate comfort level or baseline comfort level  Outcome: Progressing     Problem: Discharge Planning  Goal: Discharge to home or other facility with appropriate resources  Outcome: Progressing     Problem: Chronic Conditions and Co-morbidities  Goal: Patient's chronic conditions and co-morbidity symptoms are monitored and maintained or improved  Outcome: Progressing     Problem: Fall/Injury  Goal: Verbalize understanding of personal risk factors for fall in the hospital  Outcome: Progressing  Goal: Verbalize understanding of risk factor reduction measures to prevent injury from fall in the home  Outcome: Progressing     Problem: Skin  Goal: Decreased wound size/increased tissue granulation at next dressing change  Outcome: Progressing  Goal: Participates in plan/prevention/treatment measures  Outcome: Progressing  Goal: Prevent/manage excess moisture  Outcome: Progressing  Goal: Prevent/minimize sheer/friction injuries  Outcome: Progressing  Goal: Promote skin healing  Outcome: Progressing     Problem: Infection prevention/bleeding  Goal: No further progression of infection  Outcome: Progressing     Problem: Perfusion/Cardiac  Goal: Adequate perfusion to organs/extremities  Outcome: Progressing  Goal: Hemodynamically stable  Outcome: Progressing  Goal: No cardiac arrhythmias  Outcome: Progressing     Problem: Indwelling Catheter Maintenance  Goal: I will have no complications from indwelling catheter  Outcome: Progressing     Problem: Nutrition  Goal: Less than 5 days NPO/clear liquids  Outcome: Progressing  Goal: Nutrition support is meeting 75% of nutrient needs  Outcome: Progressing  Goal: BG  mg/dL  Outcome: Progressing  Goal: Lab values WNL  Outcome: Progressing  Goal: Electrolytes WNL  Outcome: Progressing  Goal: Promote healing  Outcome: Progressing  Goal: Maintain stable weight  Outcome: Progressing      Problem: Diabetes  Goal: Maintain glucose levels >70mg/dl to <250mg/dl throughout shift  Outcome: Progressing        The clinical goals for the shift include The patient will be less lethargic and begin to respond to staff by 10/7 0700    Patient was more alert and responsive throughout the shift. Patient remained hemodynamically stable. Hourly rounding completed. Patient safety maintained.

## 2024-10-07 NOTE — PROGRESS NOTES
INPATIENT PROGRESS NOTES    PATIENT NAME: Bing Holliday    MRN: [id number]  SERVICE DATE:  10/7/2024   SERVICE TIME:  1:04 PM    SIGNATURE: Jean Cason MD      SUBJECTIVE  Afebrile  No events overnight       OBJECTIVE  PHYSICAL EXAM:   Patient Vitals for the past 24 hrs:   BP Temp Temp src Pulse Resp SpO2 Weight   10/07/24 1006 (!) 149/94 36.5 °C (97.7 °F) Temporal 96 20 97 % --   10/07/24 0815 135/85 36.5 °C (97.7 °F) Temporal 90 20 97 % --   10/07/24 0600 -- -- -- -- -- -- 88.7 kg (195 lb 8.8 oz)   10/07/24 0506 132/78 36 °C (96.8 °F) Temporal 90 20 95 % 88.7 kg (195 lb 8.8 oz)   10/07/24 0406 134/90 36 °C (96.8 °F) Temporal 90 20 95 % --   10/07/24 0306 134/87 -- -- 92 20 96 % --   10/07/24 0206 134/80 -- -- 88 20 95 % --   10/07/24 0106 -- -- -- -- 20 -- --   10/07/24 0006 140/90 36 °C (96.8 °F) Temporal 96 20 94 % --   10/06/24 2206 130/81 -- -- 92 20 92 % --   10/06/24 2106 134/86 -- -- 108 20 94 % --   10/06/24 2006 126/76 35.9 °C (96.6 °F) Temporal 104 20 94 % --   10/06/24 1805 149/86 -- -- 100 -- 95 % --   10/06/24 1705 136/84 -- -- 104 -- 94 % --   10/06/24 1605 142/83 -- -- 102 -- 95 % --   10/06/24 1505 136/88 -- -- 102 -- 94 % --   10/06/24 1405 144/87 -- -- 102 -- 96 % --   10/06/24 1305 (!) 147/96 -- -- 108 -- 94 % --         Gen: NAD  Neck: symmetric, no mass  Cardiovascular: RRR  Respiratory: No distress   Extremities: no leg edema      Labs:  Lab Results   Component Value Date    WBC 13.1 (H) 10/07/2024    HGB 8.5 (L) 10/07/2024    HCT 28.3 (L) 10/07/2024    MCV 96 10/07/2024    PLT 79 (L) 10/07/2024     Lab Results   Component Value Date    GLUCOSE 228 (H) 10/07/2024    CALCIUM 7.8 (L) 10/07/2024     (H) 10/07/2024    K 3.3 (L) 10/07/2024    CO2 26 10/07/2024     (H) 10/07/2024    BUN 48 (H) 10/07/2024    CREATININE 1.69 (H) 10/07/2024   ESR: --  Lab Results   Component Value Date    SEDRATE 2 07/22/2024     Lab Results   Component Value Date    CRP 2.57 (H) 07/22/2024     Lab  "Results   Component Value Date    ALT 8 09/30/2024    AST 13 09/30/2024    GGT 63 (H) 01/07/2021    ALKPHOS 108 09/30/2024    BILITOT 0.6 09/30/2024         DATA:   Diagnostic tests reviewed for today's visit:    Labs this admission reviewed  Imagings this admission reviewed  Cultures: Reviewed      ASSESSMENT :   # Septic shock due to UTI - improved  # 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      Jean Cason MD.   Infectious Disease Attending            This note was partially created using voice recognition software and is inherently subject to errors including those of syntax and \"sound-alike\" substitutions which may escape proofreading. In such instances, original meaning may be extrapolated by contextual derivation      "

## 2024-10-07 NOTE — PROGRESS NOTES
Speech-Language Pathology    [facility] Adult Inpatient Speech-Language Pathology Clinical Swallow Evaluation    Patient Name: Bing Holliday  MRN: [id number]  Today's Date: 10/7/2024           Current Problem:   1. Sepsis, due to unspecified organism, unspecified whether acute organ dysfunction present (Multi)        2. Hypoglycemia

## 2024-10-07 NOTE — NURSING NOTE
Pt awake, answering questions appropriately, asking for ice chips. Ice chips given, pt able to chew and swallow, no coughing observed, pt's son at bedside, updated on plan of care

## 2024-10-07 NOTE — PROGRESS NOTES
Bing Holliday is a 63 y.o. female on day 7 of admission presenting with Sepsis, due to unspecified organism, unspecified whether acute organ dysfunction present (Multi).    Subjective   Patient seen and evaluated at bedside this morning.  No acute events overnight.  Patient is lethargic however can open her eyes when called, responds to commands, and try to talk when she was asked if she is having pain anywhere.  Given the patient lack of significant response to steroids, the dose of hydrocortisone was set back to 50 q8hr. additionally the patient seemed dehydrated on physical examination today with elevated sodium level of 146, thus NG fluid flush was increased from 200 mL to 300 mL.  Kidney function continue to improve with creatinine level of 1.69 down from 2.16 and a BUN of 48 down from 50.    Objective     Physical Exam  Vitals reviewed.   Constitutional:       General: She is not in acute distress.     Appearance: She is not ill-appearing.   HENT:      Head: Normocephalic and atraumatic.      Mouth/Throat:      Mouth: Mucous membranes are dry.   Eyes:      Pupils: Pupils are equal, round, and reactive to light.   Cardiovascular:      Rate and Rhythm: Tachycardia present. Rhythm irregular.      Pulses: Normal pulses.   Pulmonary:      Effort: Pulmonary effort is normal. No respiratory distress.      Breath sounds: No wheezing, rhonchi or rales.   Abdominal:      General: Abdomen is flat. There is no distension.      Palpations: Abdomen is soft.      Tenderness: There is no abdominal tenderness.   Musculoskeletal:      Right lower leg: No edema.      Left lower leg: No edema.   Skin:     General: Skin is warm and dry.   Neurological:      Mental Status: She is lethargic.      GCS: GCS eye subscore is 3. GCS verbal subscore is 2. GCS motor subscore is 5.       Last Recorded Vitals  Blood pressure (!) 149/94, pulse 96, temperature 36.5 °C (97.7 °F), temperature source Temporal, resp. rate 20, height 1.803 m (5'  "11\"), weight 88.7 kg (195 lb 8.8 oz), SpO2 97%.  Intake/Output last 3 Shifts:  I/O last 3 completed shifts:  In: 1205 (14.2 mL/kg) [NG/GT:1205]  Out: 850 (10 mL/kg) [Urine:850 (0.3 mL/kg/hr)]  Dosing Weight: 84.7 kg     Relevant Results                          Malnutrition Diagnosis Status: Ongoing  Malnutrition Diagnosis: Moderate malnutrition related to chronic disease or condition  As Evidenced by: recurrent adrenal crisis with resulting inconsistent oral intakes due to altered mentation, 9.6% weight loss x3 months with moderate fat and muscle mass wasting.  I agree with the dietitian's malnutrition diagnosis.      Assessment/Plan   Assessment & Plan  Sepsis, due to unspecified organism, unspecified whether acute organ dysfunction present (Multi)    Lupus (systemic lupus erythematosus) (Multi)    Encephalopathy acute    Adrenal insufficiency (Multi)    Acute kidney injury (CMS-HCC)    Bing Holliday is a 63-year-old female transferred from ICU to step-down unit for further management of acute metabolic encephalopathy secondary to UTI and complicated by adrenal insufficiency.       #Acute metabolic encephalopathy 2/2 UTI and c/b adrenal insufficiency   #Lupus cerebritis  - Patient treated for septic shock in the ICU with pressors and Vancomycin and Rocephin for UTI.  Weaned off of pressors on 10/1 at 9pm with BP stable since.  Shock was complicated by adrenal insufficiency that was likely worsening in the setting of UTI.  Patient's shock has resolved.  AMS was improving on stress-dose steroids. Patient back to stress dose steroids.  - Urine culture grew ESBL Klebsiella pneumoniae/variicola.  Rocephin was discontinued and Ertapenem was started based on sensitivities and ID recs.   - She is normally on insulin at home (Lantus 10 units AM and sliding scale).  Originally was hypoglycemic at presentation with glucose 43 but hypoglycemia has resolved since.    - Lupus cerebritis has historically been controlled on " CellCept and Cortef 10 mg AM/20 mg PM, fludrocortisone 0.1 mg every other day  - Patient had one positive blood culture growing Staph aureus/epidermidis/pettenkoferi.  Likely a contaminant; repeat cultures no growth   -Continue risperidone (in place of home Invega)     Plan:   - Will continue ertapenem per ID recs.  Today is the last dose of the antibiotics.  - Will continue with stress-dose Solu-Cortef 50 mg q8h  - Hold home CellCept in the setting of infection   - NG tube for home meds.  NG tube was placed on 10/1/2024.  Can remove once mentation improves and patient passes bedside swallow eval   - Patient was evaluated by speech but swallow study unable to be completed due to patient's decreased mentation.    - Will continue nutrition/medication via NG tube for now and reassess when mentation improves  -Increased NG tube fluid flushes from 200 mL to 300 mL.     #Afib with RVR  - Tachycardia from yesterday has been well-controlled by home metoprolol tartrate 25 mg twice daily.  HR has been stable in the range of 90s and patient has been hemodynamically stable.      Plan:  Home Metoprolol tartrate 25 mg BID restarted     #Acute on Chronic Anemia  #Anemia of Chronic Disease  #Chronic Pancytopenia  - Hemoglobin dropped from 7.6 to 6.8 on 10/2.  Patient received 1 unit PRBC.  No signs of bleeding on exam.  Hgb now stable.    -Pancytopenia at baseline, but slightly worse in setting of septic shock.    Plan:   - Continue to trend with daily CBC     #Hypoglycemia? 2/2 poor oral intake -resolved   #Uncontrolled IDDM2  -Hx of labile sugars; insulin held due to hypoglycemia concerns with poor oral intake requiring NG tube.  Glucose is stable.      Plan:  -Accuchecks     #JED on CKD 3  - Cr trending down at 2.16 today (baseline 1.4 -1.9).  Etiology likely prerenal secondary to decreased perfusion from septic shock requiring pressors vs intrinsic renal.  Patient received 1L of LR and creatinine is down to 2.16 from 2.53.  Obstructive etiology was ruled out with kidney US which did not show hydronephrosis or nephrolithiasis and bladder scan not suggestive of retention.    - Will get daily RFP   Plan:   - Trend Cr with daily RFP  - Continue water flushes to 300 q4H     Diarrhea - improved  -likely abx-related. Stool studies pending.  -C. difficile came back negative  -Patient continued to have diarrhea.  -Patient received 1 dose of Imodium to help control diarrhea  -Will continue monitoring for bowel movement frequency.  -Increase water flush to 300 every 4 hours    Chronic problems:   #Afib (on Heparin due to thrombocytopenia from Eliquis)  #CKD3  #HFpEF  #COPD  #GERD  #CAD  - continue home medications as appropriate (atorvastatin 40 mg, Keppra 500 mgm BID, Zoloft 25 mg)          DVT prophylaxis: Heparin/SCD  Diet: Enteral feeding through NG tube, NPO 55  Consult: Infectious disease, palliative care  Code: DNR/DNI             Discussed with attending,  Sully Virk MD.   Internal Medicine Resident, PGY-1    Attending addendum:   Patient seen, examined, and discussed with Dr Virk. Unfortunately, Ms Mg's mental status has not significantly improved with high dose steroids and IVF. This morning, she is minimally responsive, only able to mouth yes or no. VSS, exam otherwise stable, with continued wheezing in both lungs. Labs with mild hypernatremia this AM, which likely is contributing to today's continued encephalopathy. Her JED has improved with IVF. As stress dose steroids are not demonstrating any efficacy at this point, will wean. Switched to 50mg q8h today. Will increase FWF today for hypernatremia. Prognosis remains very poor. I discussed with son over the weekend, appreciate palliative re-addressing GOC today.

## 2024-10-07 NOTE — PROGRESS NOTES
Nutrition Follow Up Assessment:   Nutrition Assessment         Nutrition Note:  Bing Holliday is a 63 y.o. female presenting 9/30 from [facility] with hypoglycemia, hallucinations/confusion. Current work up revealing UTI, + blood cultures, septic shock and adrenal insufficiency crisis, and hypoglycemia.  Pt presently on levophed with vasopressin on hold; ID involved in pt care.     10/4/2024 Follow up: Chart reviewed and events noted. Pt transitioned off levophed 10/1 and transferred out of ICU 10/3. EN feeds initiated 10/3 however pt pulled out NGT; bridled dobbhoff placed 10/3 and EN resumed. ST attempted bedside swallow evaluation however pt very lethargic.    10/7/2024 Follow Up: Chart reviewed and events noted. Pt remains lethargic and unable to safely pass ST swallow evaluation. Pt continues with loose/watery stools; x5 10/7 so far yet - C diff. Water flushes increased to 300mL 6x/day on 10/7 due to persistent hypernatremia (146).     Past Medical History: SLE, lupus cerebritis, RA, Raynaud's syndrome, hyperparathyroidism, adrenal insufficiency, COPD, DM, HTN, HLD, atrial fibrillation (not on anticoagulation), CKD, pacemaker, seizures, psychosis   Surgical History   has a past surgical history that includes Eye surgery (03/06/2015); Total hip arthroplasty (01/29/2015); Cholecystectomy (01/29/2015); Other surgical history (01/29/2015); Other surgical history (01/29/2015); Ankle surgery (01/29/2015); Foot surgery (01/29/2015); MR angio head wo IV contrast (7/26/2013); MR angio neck wo IV contrast (7/26/2013); CT guided percutaneous biopsy bone deep (5/4/2021); MR angio head wo IV contrast (9/17/2021); MR angio neck wo IV contrast (9/17/2021); MR angio neck wo IV contrast (3/25/2023); MR angio head wo IV contrast (3/25/2023); and Cardiac electrophysiology procedure (Left, 5/17/2024).       Nutrition History:  Food and Nutrient History: 10/7: Pt remains lethargic; bridled dobbhoff remains in place and EN at goal of  "55mL/hr. Water flushes increased to 300mL 6x/day 10/7.  10/1: Pt yelling out with hands on care otherwise does not follow directions or speak. Pt from [facility]--there on low concentrated sweets/mechanical soft with thin liquids. Spoke with Hendrum at facility ([phone])--states pt fed self and ate 50-75% of meals. Pt had been on Boost Glucose Control however the supplement was discontinued in 7/2024.  Vitamin/Herbal Supplement Use: home meds include melatonin, SSI, cortef, cellcept  Food Allergies/Intolerances:  None  GI Symptoms: None  Oral Problems: Chewing difficulty, Swallowing difficulty, and on mechanical soft diet PTA       Anthropometrics:  Height: 180.3 cm (5' 11\")   Weight: 88.7 kg (195 lb 8.8 oz)   BMI (Calculated): 27.29   Admit weight 84.7kg   IBW: 70.5kg       Weight History:   10/7: 88.7kg--pt net + 4.254L as of 10/7 0700hrs  7/2024: 93.7kg 9.6% x3 months  6/2024: 95.7kg  4/2024: 90.9kg 6.8% x 6 months  10/2023: 94.6kg  Weight Change %: unintentional 9.6% x3 months. Pt at [facility] 7/15-7/30 with adrenal crisis with PPN 7/22-7/25 due to failed attempt at dobhoff placement (epitaxis). Pt was transferred to [facility] 7/30-8/2/2024 for rheumatologic evaluation with pt then more alert.      0-10 (Numeric) Pain Score: 0 - No pain  [place] FACES Pain Rating: No hurt  PAINAD Score:  [0]      Nutrition Focused Physical Exam Findings:  defer: limited as pt yells out with hands on care.   Subcutaneous Fat Loss:   Orbital Fat Pads: Mild-Moderate (slight dark circles and slight hollowing)  Buccal Fat Pads: Mild-Moderate (flat cheeks, minimal bounce)  Triceps: Defer  Ribs: Defer  Muscle Wasting:  Temporalis: Mild-Moderate (slight depression)  Pectoralis (Clavicular Region): Mild-Moderate (some protrusion of clavicle)  Deltoid/Trapezius: Mild-Moderate (slight protrusion of acromion process)  Interosseous: Defer  Trapezius/Infraspinatus/Supraspinatus (Scapular Region): Defer  Quadriceps: " Defer  Gastrocnemius: Defer  Edema:  Edema: +1 trace  Edema Location: BLE  Physical Findings:  Hair: Positive (thinning)  Skin: Positive (left elbow stage II, sacrum stage II, MASD, right leg wound)    Nutrition Significant Labs:  BMP Trend:   Results from last 7 days   Lab Units 10/07/24  0419 10/06/24  0425 10/05/24  1325 10/05/24  0416   GLUCOSE mg/dL 228* 223* 239* 300*   CALCIUM mg/dL 7.8* 8.0* 8.2* 8.3*   SODIUM mmol/L 146* 145 142 142   POTASSIUM mmol/L 3.3* 3.9 4.1 4.1   CO2 mmol/L 26 24 22 20*   CHLORIDE mmol/L 112* 111* 109* 109*   BUN mg/dL 48* 50* 50* 52*   CREATININE mg/dL 1.69* 2.16* 2.53* 2.92*    , A1C:  Lab Results   Component Value Date    HGBA1C 6.8 (H) 06/04/2024   , BG POCT trend:   Results from last 7 days   Lab Units 10/07/24  1151 10/07/24  0604 10/07/24  0110 10/06/24  2120 10/06/24  1637   POCT GLUCOSE mg/dL 252* 228* 195* 217* 199*    , Liver Function Trend:        , Vit B12:   Lab Results   Component Value Date    ADHPBNXU67 1,499 (H) 03/20/2024    , Folate:   Lab Results   Component Value Date    FOLATE >24.0 04/25/2024    , CF Vitamin Labs:   Lab Results   Component Value Date    VITD25 34 12/10/2022        Nutrition Specific Medications:  Scheduled medications  atorvastatin, 40 mg, nasogastric tube, Nightly  [Held by provider] budesonide, 0.5 mg, nebulization, BID  ertapenem, 1 g, intravenous, q24h  esomeprazole, 40 mg, nasogastric tube, Daily before breakfast  influenza, 0.5 mL, intramuscular, During hospitalization  [Held by provider] furosemide, 40 mg, oral, Daily  heparin (porcine), 5,000 Units, subcutaneous, q8h  hydrocortisone sodium succinate, 50 mg, intravenous, q8h  insulin glargine, 8 Units, subcutaneous, Nightly  insulin lispro, 0-10 Units, subcutaneous, q6h  levETIRAcetam, 500 mg, nasogastric tube, BID  metoprolol tartrate, 25 mg, nasogastric tube, BID  [Held by provider] polyethylene glycol, 17 g, oral, Daily  risperiDONE, 1 mg, nasogastric tube, BID  sertraline, 25 mg,  nasogastric tube, Daily      Continuous medications       PRN medications  PRN medications: dextrose, dextrose, glucagon, glucagon, ipratropium-albuteroL     I/O:   Last BM Date: 10/07/24; Stool Appearance: Loose (10/07/24 1100)    Dietary Orders (From admission, onward)       Start     Ordered    10/07/24 1057  Enteral feeding with NPO 55 (Titrate every 6 hours, start at 20); 300; Water; Tap water; Every 4 hours  Diet effective now        Question Answer Comment   Tube feeding formula: Glucerna 1.5    Tube feeding continuous rate (mL/hr): 55 Titrate every 6 hours, start at 20   Tube feeding flush (mL): 300    Flush type: Water    Water type: Tap water    Flush frequency: Every 4 hours        10/07/24 1056    09/30/24 1901  May Participate in Room Service With Assistance  Once        Question:  .  Answer:  Yes    09/30/24 1900                     Estimated Needs:   Total Energy Estimated Needs (kCal):  (5597-8224 (24-26kcal/kg of 84.7kg))     Total Protein Estimated Needs (g):  (92-113g (1.3-1.6g/kg of IBW 70.5kg))     Total Fluid Estimated Needs (mL):  (1mL/kcal/d or as per physician)           Nutrition Diagnosis   Malnutrition Diagnosis  Patient has Malnutrition Diagnosis: Yes  Diagnosis Status: Ongoing  Malnutrition Diagnosis: Moderate malnutrition related to chronic disease or condition  As Evidenced by: recurrent adrenal crisis with resulting inconsistent oral intakes due to altered mentation, 9.6% weight loss x3 months with moderate fat and muscle mass wasting.  Additional Assessment Information: 10/7: Case discussed with nurses; pt tolerating EN at goal of 55mL/hr.            Nutrition Interventions/Recommendations         Nutrition Prescription:  Individualized Nutrition Prescription Provided for : continue enteral nutrition        Nutrition Interventions:   Interventions: Enteral intake  Enteral Intake: Modify composition of enteral nutrition  Goal: Due to persistent loose stools, suggest change EN to  Vital 1.5 goal of 55mL/hr to provide 1980kcal, 89g pro, and 1008mL formula free water or >75%of estimated kcal needs with 1.3g pro/kg IBW.  Adjust water flush pending fluid needs--currently providing 300mL 6x/day for total water of 2808mL water (formula + flush).    Collaboration and Referral of Nutrition Care: Collaboration by nutrition professional with other providers  Goal: AMINAH Mcknight and Katie; Epic Chat Dr Virk    Nutrition Education:   10/4: pt not appropriate for education at present time.        Nutrition Monitoring and Evaluation   Food/Nutrient Related History Monitoring  Monitoring and Evaluation Plan: Energy intake  Energy Intake: Estimated energy intake  Criteria: >75% of estimated nutrient needs via EN    Body Composition/Growth/Weight History  Monitoring and Evaluation Plan: Weight  Weight: Measured weight  Criteria: daily weight    Biochemical Data, Medical Tests and Procedures  Monitoring and Evaluation Plan: Electrolyte/renal panel, Glucose/endocrine profile  Electrolyte and Renal Panel: Magnesium, Phosphorus, Potassium, Sodium  Criteria: lytes WNR  Glucose/Endocrine Profile: Glucose, casual, Glucose, fasting  Criteria: BG 70-180mg/dL    Nutrition Focused Physical Findings  Monitoring and Evaluation Plan: Skin  Skin: Impaired wound healing  Criteria: promote skin integrity         Time Spent (min): 45 minutes

## 2024-10-07 NOTE — PROGRESS NOTES
Speech-Language Pathology    SLP Adult Inpatient  Speech-Language Pathology Treatment     Patient Name: Bing Holliday  MRN: [id number]  Today's Date: 10/7/2024  Time Calculation  Start Time: 1315  Stop Time: 1325  Time Calculation (min): 10 min         Current Problem:   1. Sepsis, due to unspecified organism, unspecified whether acute organ dysfunction present (Multi)        2. Hypoglycemia              SLP Assessment:  SLP TX Intervention Outcome: No Progress Made  SLP Assessment Results: Other (Comment) (oropharyngeal dysphagia)  Prognosis: Fair  Patient was assisted into an upright position, and oral care was provided via suction toothbrush and oral moisturizer. Patient's alertness did not increase visibly during oral care, although she was not resistive as she had been during the initial evaluation. Patient did open mouth slightly, whether by reflex or intention. Patient was presented with ice chip at lips for stimulation, and did not attempt to strip or manipulate bolus. Patient also did not respond when presented trial of puree and then a straw to drink water. Trials were discontinued due to absence of response.  Patient is known to ST from prior admissions, and tends to demonstrate improvement in oropharyngeal swallow when alertness level improves. Will continue to assess to determine diet advancement when appropriate.  Treatment Tolerance: Patient limited by fatigue  Medical Staff Made Aware: Yes  Barriers: Cognition, Comorbidities, Motivation  Education Provided: Yes       Plan:  Inpatient/Swing Bed or Outpatient: Inpatient  Treatment/Interventions: Other (Comment) (Dysphagia management)  SLP TX Plan: Continue Plan of Care  SLP Plan: Skilled SLP  SLP Frequency: 3x per week  Duration: 2 weeks  SLP Discharge Recommendations: Continue skilled SLP services at the next level of care  Next Treatment Priority: Continue to assess for diet advancement  Discussed POC: Patient, Nursing  Patient/Caregiver Agreeable:  Other (Comment) (unable to state)      Subjective   Patient did not follow commands or respond to questions. Bridled dobhoff tube remains in place.    Most Recent Visit:  SLP Received On: 10/07/24    General Visit Information:   Reason for Referral: Swallow evaluation  Referred By: Mynor Jennings DO  Past Medical History Relevant to Rehab: SLE, lupus cerebritis, RA, Raynaud's syndrome, hyperparathyroidism, adrenal insufficiency, COPD, DM, HTN, HLD, atrial fibrillation (not on anticoagulation), CKD, pacemaker, seizures, psychosis  Patient Seen During This Visit: Yes  Caregiver Feedback: RN reported patient was opening eyes at times this morning, but remains unable to follow commands.  Total Number of Visits : 2  Prior to Session Communication: Bedside nurse      Pain Assessment:  Pain Assessment  Pain Assessment: PAINAD (Pain Assessment in Advanced Dementia Scale)  PAINAD (Pain Assessment in Advanced Dementia)  Breathing: Normal  Negative Vocalization: None  Facial Expression: Smiling or inexpressive  Body Language: Relaxed  Consolability: No need to console  PAINAD Score: 0      Objective     Goals (established 12/4/24):  Patient will complete further assessment of swallowing to determine PO diet advancement vs need for further assessment (i.e., MBSS).  Status: Not progressing     Baseline Assessment:  Respiratory Status:  Room air  Patient Positioning: Upright in Bed     Therapeutic Swallow:  Therapeutic Swallow Intervention : PO Trials, Caregiver Education  Solid Diet Recommendations: NPO, Continue alternate means of nutrition  Liquid Diet Recommendations: NPO, Continue alternate means of nutrition  Swallow Comments: STRICT oral care      Inpatient:    Education:  Learner patient   Barriers to Learning acuteness of illness barrier; cognitive limitations barrier; communication limitations barrier   Method demonstration; verbal   Education - Topic ST provided patient education regarding role of ST, purpose of  assessment, clinical impressions, goals of treatment, and plan of care. Patient verbalized questionable full comprehension, consistent with cognitive status. Education will be reinforced. ST further coordinated with RN regarding recommendations and precautions per this assessment, with RN verbalizing understanding.     Outcome    unable to meet; needs reinforcement

## 2024-10-08 LAB
ALBUMIN SERPL BCP-MCNC: 2.6 G/DL (ref 3.4–5)
ANION GAP SERPL CALC-SCNC: 10 MMOL/L (ref 10–20)
BUN SERPL-MCNC: 40 MG/DL (ref 6–23)
CALCIUM SERPL-MCNC: 7.3 MG/DL (ref 8.6–10.3)
CHLORIDE SERPL-SCNC: 112 MMOL/L (ref 98–107)
CO2 SERPL-SCNC: 27 MMOL/L (ref 21–32)
CREAT SERPL-MCNC: 1.27 MG/DL (ref 0.5–1.05)
EGFRCR SERPLBLD CKD-EPI 2021: 48 ML/MIN/1.73M*2
ERYTHROCYTE [DISTWIDTH] IN BLOOD BY AUTOMATED COUNT: 22.2 % (ref 11.5–14.5)
GLUCOSE BLD MANUAL STRIP-MCNC: 281 MG/DL (ref 74–99)
GLUCOSE BLD MANUAL STRIP-MCNC: 324 MG/DL (ref 74–99)
GLUCOSE BLD MANUAL STRIP-MCNC: 325 MG/DL (ref 74–99)
GLUCOSE BLD MANUAL STRIP-MCNC: 332 MG/DL (ref 74–99)
GLUCOSE SERPL-MCNC: 344 MG/DL (ref 74–99)
HCT VFR BLD AUTO: 28.6 % (ref 36–46)
HGB BLD-MCNC: 8.1 G/DL (ref 12–16)
MAGNESIUM SERPL-MCNC: 2.12 MG/DL (ref 1.6–2.4)
MCH RBC QN AUTO: 28.6 PG (ref 26–34)
MCHC RBC AUTO-ENTMCNC: 28.3 G/DL (ref 32–36)
MCV RBC AUTO: 101 FL (ref 80–100)
NRBC BLD-RTO: 5.1 /100 WBCS (ref 0–0)
PHOSPHATE SERPL-MCNC: 2 MG/DL (ref 2.5–4.9)
PLATELET # BLD AUTO: 82 X10*3/UL (ref 150–450)
POTASSIUM SERPL-SCNC: 3.1 MMOL/L (ref 3.5–5.3)
RBC # BLD AUTO: 2.83 X10*6/UL (ref 4–5.2)
SODIUM SERPL-SCNC: 146 MMOL/L (ref 136–145)
WBC # BLD AUTO: 8.4 X10*3/UL (ref 4.4–11.3)

## 2024-10-08 PROCEDURE — 82947 ASSAY GLUCOSE BLOOD QUANT: CPT

## 2024-10-08 PROCEDURE — 92526 ORAL FUNCTION THERAPY: CPT | Mod: GN | Performed by: SPEECH-LANGUAGE PATHOLOGIST

## 2024-10-08 PROCEDURE — 85027 COMPLETE CBC AUTOMATED: CPT

## 2024-10-08 PROCEDURE — 2500000002 HC RX 250 W HCPCS SELF ADMINISTERED DRUGS (ALT 637 FOR MEDICARE OP, ALT 636 FOR OP/ED)

## 2024-10-08 PROCEDURE — 2500000004 HC RX 250 GENERAL PHARMACY W/ HCPCS (ALT 636 FOR OP/ED)

## 2024-10-08 PROCEDURE — 2500000001 HC RX 250 WO HCPCS SELF ADMINISTERED DRUGS (ALT 637 FOR MEDICARE OP)

## 2024-10-08 PROCEDURE — 2500000001 HC RX 250 WO HCPCS SELF ADMINISTERED DRUGS (ALT 637 FOR MEDICARE OP): Performed by: STUDENT IN AN ORGANIZED HEALTH CARE EDUCATION/TRAINING PROGRAM

## 2024-10-08 PROCEDURE — 36415 COLL VENOUS BLD VENIPUNCTURE: CPT

## 2024-10-08 PROCEDURE — 83735 ASSAY OF MAGNESIUM: CPT

## 2024-10-08 PROCEDURE — 80069 RENAL FUNCTION PANEL: CPT

## 2024-10-08 PROCEDURE — 2060000001 HC INTERMEDIATE ICU ROOM DAILY

## 2024-10-08 RX ORDER — INSULIN GLARGINE 100 [IU]/ML
10 INJECTION, SOLUTION SUBCUTANEOUS NIGHTLY
Status: DISCONTINUED | OUTPATIENT
Start: 2024-10-08 | End: 2024-10-11 | Stop reason: HOSPADM

## 2024-10-08 ASSESSMENT — PAIN - FUNCTIONAL ASSESSMENT
PAIN_FUNCTIONAL_ASSESSMENT: 0-10

## 2024-10-08 ASSESSMENT — PAIN SCALES - GENERAL
PAINLEVEL_OUTOF10: 0 - NO PAIN

## 2024-10-08 ASSESSMENT — COGNITIVE AND FUNCTIONAL STATUS - GENERAL
DRESSING REGULAR UPPER BODY CLOTHING: TOTAL
HELP NEEDED FOR BATHING: TOTAL
TOILETING: TOTAL
MOVING FROM LYING ON BACK TO SITTING ON SIDE OF FLAT BED WITH BEDRAILS: TOTAL
PERSONAL GROOMING: TOTAL
STANDING UP FROM CHAIR USING ARMS: TOTAL
EATING MEALS: A LOT
TURNING FROM BACK TO SIDE WHILE IN FLAT BAD: TOTAL
MOVING TO AND FROM BED TO CHAIR: TOTAL
CLIMB 3 TO 5 STEPS WITH RAILING: TOTAL
DAILY ACTIVITIY SCORE: 7
MOBILITY SCORE: 6
WALKING IN HOSPITAL ROOM: TOTAL
DRESSING REGULAR LOWER BODY CLOTHING: TOTAL

## 2024-10-08 NOTE — CARE PLAN
Problem: Pain - Adult  Goal: Verbalizes/displays adequate comfort level or baseline comfort level  Outcome: Met     Problem: Skin  Goal: Prevent/manage excess moisture  Outcome: Met  Flowsheets (Taken 10/3/2024 2243 by Hailey Manley RN)  Prevent/manage excess moisture:   Cleanse incontinence/protect with barrier cream   Monitor for/manage infection if present   Moisturize dry skin       Pt remained hemodynamically stable this shift. Pt awake and able to answer questions at times but confused this shift. Tube feed running continuously at 55cc/hr. Pt not alert enough to take anything PO this shift. Pt had two large bowel movements. Safety maintained. Alarms on.

## 2024-10-08 NOTE — NURSING NOTE
Pt is much more alert and oriented today. Pt denies any pain or discomfort. She asked for water, informed her that she is NPO until speech comes to do a swallow evaluation. Pt expressed understanding. Support provided to pt. Palliative will continue to follow and address outcomes of swallow evaluation with son.    Discussed case with attending.  Will continue to follow for supportive care.

## 2024-10-08 NOTE — CARE PLAN
SHIFT NOTE    S: Sepsis    B: JED, HLD, PAF, CVA< GERD, OA, UTI, anxiety, COPD, CHF, CABG, dementia, DM 2, PM    A: Patient remains hds. She was still only AO to self last night. She remains in RA and sating well. Patient tolerating tube feeds and blood sugars are being covered Q6H accordingly. She remains afebrile and no s/s of pain. SLP wasn't able to evaluate her yesterday and stated they will see her this morning. She had 2 episodes of large, watery bowel movements, as well as urine.     R: Hourly rounding was performed and patient needs were met. Call light within reach. No other acute events, will continue to monitor.       The patient's goals for the shift include      Problem: Pain - Adult  Goal: Verbalizes/displays adequate comfort level or baseline comfort level  10/8/2024 0708 by Malini Mathur RN  Outcome: Progressing  10/7/2024 2244 by Malini Mathur RN  Flowsheets (Taken 10/3/2024 2243 by Hailey Manley RN)  Verbalizes/displays adequate comfort level or baseline comfort level:   Administer analgesics based on type and severity of pain and evaluate response   Assess pain using appropriate pain scale   Implement non-pharmacological measures as appropriate and evaluate response   Encourage patient to monitor pain and request assistance     Problem: Chronic Conditions and Co-morbidities  Goal: Patient's chronic conditions and co-morbidity symptoms are monitored and maintained or improved  10/8/2024 0708 by Malnii Mathur RN  Outcome: Progressing  10/7/2024 2244 by Malini Mathur RN  Flowsheets (Taken 10/3/2024 2243 by Hailey Manley RN)  Care Plan - Patient's Chronic Conditions and Co-Morbidity Symptoms are Monitored and Maintained or Improved: Monitor and assess patient's chronic conditions and comorbid symptoms for stability, deterioration, or improvement     Problem: Fall/Injury  Goal: Verbalize understanding of personal risk factors for fall in the hospital  Outcome: Progressing     Problem:  Skin  Goal: Participates in plan/prevention/treatment measures  10/8/2024 0708 by Malini Mathur RN  Outcome: Progressing  10/7/2024 2244 by Malini Mathur RN  Flowsheets (Taken 10/3/2024 2243 by Hailey Manley RN)  Participates in plan/prevention/treatment measures: Elevate heels     Problem: Skin  Goal: Prevent/manage excess moisture  10/8/2024 0708 by Malini Mathur RN  Outcome: Progressing  10/7/2024 2244 by Malini Mathur RN  Flowsheets (Taken 10/3/2024 2243 by Hailey Manley RN)  Prevent/manage excess moisture:   Cleanse incontinence/protect with barrier cream   Monitor for/manage infection if present   Moisturize dry skin       The clinical goals for the shift include Patient will remain hds by end of shift 10/8/24 at 0700.

## 2024-10-08 NOTE — PROGRESS NOTES
Speech-Language Pathology    SLP Adult Inpatient  Speech-Language Pathology Treatment     Patient Name: Bing Holliday  MRN: [id number]  Today's Date: 10/8/2024  Time Calculation  Start Time: 1336  Stop Time: 1352  Time Calculation (min): 16 min         Current Problem:   1. Sepsis, due to unspecified organism, unspecified whether acute organ dysfunction present (Multi)        2. Hypoglycemia              SLP Assessment:  SLP TX Intervention Outcome: Making Progress Towards Goals  SLP Assessment Results: Other (Comment) (oropharyngeal dysphagia)  Prognosis: Good  Patient was assisted into an upright position, and oral care was provided via toothbrush/toothpaste. Patient tolerated PO trials of ice chips, thin liquid via cup, and thin liquid via single straw sips without overt s/s of aspiration. Cough response was noted when patient was attempting to drink consecutive sips. Patient also tolerated PO trials of puree, with extended oral manipulation noted. Patient required cues to close lips around spoon/straw during PO trials. Trial of soft solid was attempted, but remained in patient's oral cavity following liquid wash and required removal via swab.  Per this assessment, recommend patient for PO diet advancement to puree and thin liquids, ONLY WHEN ALERT. Patient is known to ST from prior admissions, and tends to demonstrate improvement in oropharyngeal swallow when alertness level improves. Will continue to assess to determine diet advancement as appropriate.  Treatment Tolerance: Patient tolerated treatment well  Medical Staff Made Aware: Yes  Barriers: Cognition, Comorbidities  Education Provided: Yes       Plan:  Inpatient/Swing Bed or Outpatient: Inpatient  Treatment/Interventions: Other (Comment) (Dysphagia management)  SLP TX Plan: Continue Plan of Care  SLP Plan: Skilled SLP  SLP Frequency: 3x per week  Duration: 2 weeks  SLP Discharge Recommendations: Continue skilled SLP services at the next level of  care  Next Treatment Priority: Diet tolerance, advance diet as tolerated  Discussed POC: Patient, Nursing  Patient/Caregiver Agreeable: Yes      Subjective   Patient with hypophonic vocal quality, but was able to answer most simple questions this date.    Most Recent Visit:  SLP Received On: 10/08/24    General Visit Information:   Reason for Referral: Swallow evaluation  Referred By: Mynor Jennings DO  Past Medical History Relevant to Rehab: SLE, lupus cerebritis, RA, Raynaud's syndrome, hyperparathyroidism, adrenal insufficiency, COPD, DM, HTN, HLD, atrial fibrillation (not on anticoagulation), CKD, pacemaker, seizures, psychosis  Patient Seen During This Visit: Yes  Caregiver Feedback: RN reported patient was lethargic earlier this date.  Total Number of Visits : 3  Prior to Session Communication: Bedside nurse      Pain Assessment:  Pain Assessment  Pain Assessment: 0-10  0-10 (Numeric) Pain Score: 0 - No pain      Objective     Goals (established 12/4/24):  Patient will complete further assessment of swallowing to determine PO diet advancement vs need for further assessment (i.e., MBSS).  Status: GOAL MET patient participated in PO trials this date, and is recommended for PO diet advancement (modified diet)    New Goals (updated 10/8/24):  1. Patient will tolerate baseline diet absent of overt s/s of aspiration in 90% of observed therapeutic trials.  2. Patient will implement safe swallowing strategies to reduce risk of aspiration in 90% of trials given caregiver assistance/cueing as needed.      Baseline Assessment:  Respiratory Status:  Room air  Patient Positioning: Upright in Bed      Therapeutic Swallow:  Therapeutic Swallow Intervention : PO Trials, Caregiver Education, Compensatory Strategies  Solid Diet Recommendations: Pureed/extremely thick  (IDDSI Level 4)  Liquid Diet Recommendations: Thin (IDDSI Level 0)  Swallow Comments: ONLY WHEN ALERT, 1:1 feeding assistance, slow rate, cue for swallow  PRN      Inpatient:    Education:  Learner patient   Barriers to Learning acuteness of illness barrier; cognitive limitations barrier   Method demonstration; verbal   Education - Topic ST provided patient education regarding role of ST, purpose of assessment, clinical impressions, goals of treatment, and plan of care. Patient verbalized questionable full comprehension, consistent with cognitive status. Education will be reinforced. ST further coordinated with RN regarding recommendations and precautions per this assessment, with RN verbalizing understanding.     Outcome    Verbalized understanding and agreement; needs review/reinforcement

## 2024-10-08 NOTE — PROGRESS NOTES
"Bing Holliday is a 63 y.o. female on day 8 of admission presenting with Sepsis, due to unspecified organism, unspecified whether acute organ dysfunction present (Multi).    Subjective   Patient seen and evaluated at bedside this morning.  No acute events overnight.  Patient is lethargic however her mentation has improved compared to yesterday.  She was A&O x 3.  Patient can speak but she is very weak.  She denied having any chest pain, shortness of breath, abdominal pain, dizziness, lightheadedness or chills.       Objective     Physical Exam  Vitals reviewed.   Constitutional:       General: She is not in acute distress.     Appearance: She is not ill-appearing.   HENT:      Head: Normocephalic and atraumatic.      Mouth/Throat:      Mouth: Mucous membranes are dry.   Eyes:      Pupils: Pupils are equal, round, and reactive to light.   Cardiovascular:      Rate and Rhythm: Tachycardia present. Rhythm irregular.      Pulses: Normal pulses.   Pulmonary:      Effort: Pulmonary effort is normal. No respiratory distress.      Breath sounds: No wheezing, rhonchi or rales.   Abdominal:      General: Abdomen is flat. There is no distension.      Palpations: Abdomen is soft.      Tenderness: There is no abdominal tenderness.   Musculoskeletal:      Right lower leg: No edema.      Left lower leg: No edema.   Skin:     General: Skin is warm and dry.   Neurological:      Mental Status: She is lethargic.      GCS: GCS eye subscore is 3. GCS verbal subscore is 2. GCS motor subscore is 5.         Last Recorded Vitals  Blood pressure (!) 167/98, pulse 88, temperature 36.2 °C (97.2 °F), temperature source Temporal, resp. rate 18, height 1.803 m (5' 11\"), weight 87.7 kg (193 lb 5.5 oz), SpO2 97%.  Intake/Output last 3 Shifts:  I/O last 3 completed shifts:  In: 4170 (49.2 mL/kg) [NG/GT:4170]  Out: - (0 mL/kg)   Dosing Weight: 84.7 kg     Relevant Results            This patient currently has cardiac telemetry ordered; if you would " like to modify or discontinue the telemetry order, click here to go to the orders activity to modify/discontinue the order.             Malnutrition Diagnosis Status: Ongoing  Malnutrition Diagnosis: Moderate malnutrition related to chronic disease or condition  As Evidenced by: recurrent adrenal crisis with resulting inconsistent oral intakes due to altered mentation, 9.6% weight loss x3 months with moderate fat and muscle mass wasting.  I agree with the dietitian's malnutrition diagnosis.      Assessment/Plan   Assessment & Plan  Sepsis, due to unspecified organism, unspecified whether acute organ dysfunction present (Multi)    Lupus (systemic lupus erythematosus) (Multi)    Encephalopathy acute    Adrenal insufficiency (Multi)    Acute kidney injury (CMS-HCC)    Bing Holliday is a 63-year-old female transferred from ICU to step-down unit for further management of acute metabolic encephalopathy secondary to UTI and complicated by adrenal insufficiency.       #Acute metabolic encephalopathy 2/2 UTI and c/b adrenal insufficiency   #Lupus cerebritis  - Patient treated for septic shock in the ICU with pressors and Vancomycin and Rocephin for UTI.  Weaned off of pressors on 10/1 at 9pm with BP stable since.  Shock was complicated by adrenal insufficiency that was likely worsening in the setting of UTI.  Patient's shock has resolved.  AMS was improving on stress-dose steroids. Patient back to stress dose steroids.  - Urine culture grew ESBL Klebsiella pneumoniae/variicola.  Patient has finished her ertapenem antibiotic course.  - Lupus cerebritis has historically been controlled on CellCept and Cortef 10 mg AM/20 mg PM, fludrocortisone 0.1 mg every other day  - Patient had one positive blood culture growing Staph aureus/epidermidis/pettenkoferi.  Likely a contaminant; repeat cultures no growth   -Continue risperidone (in place of home Invega)     Plan:   - Stress-dose Solu-Cortef 50 mg is tapered to BID  - Hold home  CellCept in the setting of infection   - NG tube for home meds.  NG tube was placed on 10/1/2024.  Can remove once mentation improves and patient passes bedside swallow eval  - Patient was evaluated by speech but swallow study.  Patient tolerating p.o. trials of ice chips, thin liquid via cup, thin liquids via single straw sips without overt signs or symptoms of aspiration.  Additionally patient tolerating p.o. trials puree.  Patient failed trial of soft solid p.o. intake.  -Per ST recommendation patient diet can be advanced to puree and thin liquids but only when the patient is alert.  - Will continue nutrition/medication via NG tube for now and reassess when mentation improves  -Increased NG tube fluid flushes from 200 mL to 300 mL.  -Palliative care has discussed goals of care with the patient's son who wished to give her a couple of more days in the hospital and if no improvement was observed he would consider hospice.  Otherwise patient will be discharged to SNF if improved  -Patient pre-CERT started yesterday at [place] in [facility].     #Afib with RVR  - Tachycardia from yesterday has been well-controlled by home metoprolol tartrate 25 mg twice daily.  HR has been stable in the range of 90s and patient has been hemodynamically stable.      Plan:  Home Metoprolol tartrate 25 mg BID restarted     #Acute on Chronic Anemia  #Anemia of Chronic Disease  #Chronic Pancytopenia  - Hemoglobin dropped from 7.6 to 6.8 on 10/2.  Patient received 1 unit PRBC.  No signs of bleeding on exam.  Hgb now stable.    -Pancytopenia at baseline, but slightly worse in setting of septic shock.    Plan:   - Continue to trend with daily CBC     #Hypoglycemia? 2/2 poor oral intake -resolved   #Uncontrolled IDDM2  -Hx of labile sugars; insulin held due to hypoglycemia concerns with poor oral intake requiring NG tube.  Glucose is stable.  -Morning blood glucose level is 344.    Plan:  -Accuchecks  -Lantus dose was increased to 10 units  to achieve better glycemic control.     #JED on CKD 3  -Cr trending down at 1.27 today (baseline 1.4 -1.9). Etiology likely prerenal secondary to decreased perfusion from septic shock requiring pressors vs intrinsic renal.    -Obstructive etiology was ruled out with kidney US which did not show hydronephrosis or nephrolithiasis and bladder scan not suggestive of retention.    -Will get daily RFP   Plan:   - Trend Cr with daily RFP  - Continue water flushes to 300 q4H     Diarrhea - improved  -likely abx-related. Stool studies pending.  -C. difficile came back negative  -Patient continued to have diarrhea.  -Patient received Imodium x2 to help control diarrhea  -Will continue monitoring for bowel movement frequency.  -Increase water flush to 300 every 4 hours    Chronic problems:   #Afib (on Heparin due to thrombocytopenia from Eliquis)  #CKD3  #HFpEF  #COPD  #GERD  #CAD  - continue home medications as appropriate (atorvastatin 40 mg, Keppra 500 mgm BID, Zoloft 25 mg)          DVT prophylaxis: Heparin/SCD  Diet: Enteral feeding through NG tube, NPO 55  Consult: Infectious disease, palliative care  Code: DNR/DNI             Discussed with attending,  Sully Virk MD.   Internal Medicine Resident, PGY-1

## 2024-10-09 LAB
ALBUMIN SERPL BCP-MCNC: 2.6 G/DL (ref 3.4–5)
ANION GAP SERPL CALC-SCNC: 11 MMOL/L (ref 10–20)
BUN SERPL-MCNC: 34 MG/DL (ref 6–23)
C COLI+JEJ+UPSA DNA STL QL NAA+PROBE: NOT DETECTED
CALCIUM SERPL-MCNC: 7 MG/DL (ref 8.6–10.3)
CHLORIDE SERPL-SCNC: 110 MMOL/L (ref 98–107)
CO2 SERPL-SCNC: 27 MMOL/L (ref 21–32)
CREAT SERPL-MCNC: 1.02 MG/DL (ref 0.5–1.05)
EC STX1 GENE STL QL NAA+PROBE: NOT DETECTED
EC STX2 GENE STL QL NAA+PROBE: NOT DETECTED
EGFRCR SERPLBLD CKD-EPI 2021: 62 ML/MIN/1.73M*2
ERYTHROCYTE [DISTWIDTH] IN BLOOD BY AUTOMATED COUNT: 22.6 % (ref 11.5–14.5)
GLUCOSE BLD MANUAL STRIP-MCNC: 223 MG/DL (ref 74–99)
GLUCOSE BLD MANUAL STRIP-MCNC: 243 MG/DL (ref 74–99)
GLUCOSE BLD MANUAL STRIP-MCNC: 250 MG/DL (ref 74–99)
GLUCOSE BLD MANUAL STRIP-MCNC: 276 MG/DL (ref 74–99)
GLUCOSE SERPL-MCNC: 277 MG/DL (ref 74–99)
HCT VFR BLD AUTO: 29.1 % (ref 36–46)
HGB BLD-MCNC: 8.4 G/DL (ref 12–16)
MAGNESIUM SERPL-MCNC: 1.65 MG/DL (ref 1.6–2.4)
MCH RBC QN AUTO: 28.7 PG (ref 26–34)
MCHC RBC AUTO-ENTMCNC: 28.9 G/DL (ref 32–36)
MCV RBC AUTO: 99 FL (ref 80–100)
NOROVIRUS GI + GII RNA STL NAA+PROBE: NOT DETECTED
NRBC BLD-RTO: 3.2 /100 WBCS (ref 0–0)
PHOSPHATE SERPL-MCNC: 2 MG/DL (ref 2.5–4.9)
PLATELET # BLD AUTO: 84 X10*3/UL (ref 150–450)
POTASSIUM SERPL-SCNC: 3.2 MMOL/L (ref 3.5–5.3)
RBC # BLD AUTO: 2.93 X10*6/UL (ref 4–5.2)
RV RNA STL NAA+PROBE: NOT DETECTED
SALMONELLA DNA STL QL NAA+PROBE: NOT DETECTED
SHIGELLA DNA SPEC QL NAA+PROBE: NOT DETECTED
SODIUM SERPL-SCNC: 145 MMOL/L (ref 136–145)
V CHOLERAE DNA STL QL NAA+PROBE: NOT DETECTED
WBC # BLD AUTO: 8.8 X10*3/UL (ref 4.4–11.3)
Y ENTEROCOL DNA STL QL NAA+PROBE: NOT DETECTED

## 2024-10-09 PROCEDURE — 2500000004 HC RX 250 GENERAL PHARMACY W/ HCPCS (ALT 636 FOR OP/ED)

## 2024-10-09 PROCEDURE — 2500000001 HC RX 250 WO HCPCS SELF ADMINISTERED DRUGS (ALT 637 FOR MEDICARE OP)

## 2024-10-09 PROCEDURE — 2500000002 HC RX 250 W HCPCS SELF ADMINISTERED DRUGS (ALT 637 FOR MEDICARE OP, ALT 636 FOR OP/ED)

## 2024-10-09 PROCEDURE — 80069 RENAL FUNCTION PANEL: CPT

## 2024-10-09 PROCEDURE — 1200000002 HC GENERAL ROOM WITH TELEMETRY DAILY

## 2024-10-09 PROCEDURE — 83735 ASSAY OF MAGNESIUM: CPT

## 2024-10-09 PROCEDURE — 2500000001 HC RX 250 WO HCPCS SELF ADMINISTERED DRUGS (ALT 637 FOR MEDICARE OP): Performed by: STUDENT IN AN ORGANIZED HEALTH CARE EDUCATION/TRAINING PROGRAM

## 2024-10-09 PROCEDURE — 99233 SBSQ HOSP IP/OBS HIGH 50: CPT | Performed by: STUDENT IN AN ORGANIZED HEALTH CARE EDUCATION/TRAINING PROGRAM

## 2024-10-09 PROCEDURE — 85027 COMPLETE CBC AUTOMATED: CPT

## 2024-10-09 PROCEDURE — 82947 ASSAY GLUCOSE BLOOD QUANT: CPT

## 2024-10-09 PROCEDURE — 36415 COLL VENOUS BLD VENIPUNCTURE: CPT

## 2024-10-09 RX ORDER — SODIUM,POTASSIUM PHOSPHATES 280-250MG
1 POWDER IN PACKET (EA) ORAL 4 TIMES DAILY
Status: DISCONTINUED | OUTPATIENT
Start: 2024-10-09 | End: 2024-10-09

## 2024-10-09 ASSESSMENT — COGNITIVE AND FUNCTIONAL STATUS - GENERAL
MOBILITY SCORE: 6
TOILETING: A LOT
CLIMB 3 TO 5 STEPS WITH RAILING: TOTAL
DRESSING REGULAR LOWER BODY CLOTHING: TOTAL
WALKING IN HOSPITAL ROOM: TOTAL
TURNING FROM BACK TO SIDE WHILE IN FLAT BAD: TOTAL
DRESSING REGULAR UPPER BODY CLOTHING: TOTAL
DAILY ACTIVITIY SCORE: 6
DRESSING REGULAR LOWER BODY CLOTHING: TOTAL
HELP NEEDED FOR BATHING: TOTAL
STANDING UP FROM CHAIR USING ARMS: TOTAL
MOVING TO AND FROM BED TO CHAIR: TOTAL
EATING MEALS: TOTAL
EATING MEALS: TOTAL
MOBILITY SCORE: 6
TOILETING: TOTAL
DAILY ACTIVITIY SCORE: 7
MOVING FROM LYING ON BACK TO SITTING ON SIDE OF FLAT BED WITH BEDRAILS: TOTAL
PERSONAL GROOMING: TOTAL
CLIMB 3 TO 5 STEPS WITH RAILING: TOTAL
PERSONAL GROOMING: TOTAL
DRESSING REGULAR UPPER BODY CLOTHING: TOTAL
TURNING FROM BACK TO SIDE WHILE IN FLAT BAD: TOTAL
WALKING IN HOSPITAL ROOM: TOTAL
MOVING TO AND FROM BED TO CHAIR: TOTAL
STANDING UP FROM CHAIR USING ARMS: TOTAL
MOVING FROM LYING ON BACK TO SITTING ON SIDE OF FLAT BED WITH BEDRAILS: TOTAL
HELP NEEDED FOR BATHING: TOTAL

## 2024-10-09 ASSESSMENT — PAIN - FUNCTIONAL ASSESSMENT: PAIN_FUNCTIONAL_ASSESSMENT: 0-10

## 2024-10-09 ASSESSMENT — PAIN SCALES - GENERAL
PAINLEVEL_OUTOF10: 0 - NO PAIN

## 2024-10-09 NOTE — NURSING NOTE
Pt improving from admission. Diet has been advanced after pt was seen by speech therapy yesterday. Pre cert has been started for pt to return the [facility].     [facility] to follow up at discharge.  No further palliative needs, will sign off.

## 2024-10-09 NOTE — PROGRESS NOTES
Nutrition Follow Up Assessment:   Nutrition Assessment         Nutrition Note:  Bing Holliday is a 63 y.o. female presenting 9/30 from [facility] with hypoglycemia, hallucinations/confusion. Current work up revealing UTI, + blood cultures, septic shock and adrenal insufficiency crisis, and hypoglycemia.  Pt presently on levophed with vasopressin on hold; ID involved in pt care.     10/4/2024 Follow up: Chart reviewed and events noted. Pt transitioned off levophed 10/1 and transferred out of ICU 10/3. EN feeds initiated 10/3 however pt pulled out NGT; bridled dobbhoff placed 10/3 and EN resumed. ST attempted bedside swallow evaluation however pt very lethargic.    10/7/2024 Follow Up: Chart reviewed and events noted. Pt remains lethargic and unable to safely pass ST swallow evaluation. Pt continues with loose/watery stools; x5 10/7 so far yet - C diff. Water flushes increased to 300mL 6x/day on 10/7 due to persistent hypernatremia (146).     10/9/24 follow up: Wound care and Palliative Care on consult.   SLP eval 10/8 w/recommendation for thin liquids and pureed solids. Pt being fed pureed lunch during RD visit today. Pt reports not much of an appetite. Pt agreeable to vanilla Glucerna Shakes. Pt continues w/enteral nutrition via Corpak with Vital 1.5 at 55mL/hr and water flushes of 300mL 6x/day. Pt sodium now WNL. Suggest continuing enteral nutrition as ordered until PO intakes are consistent, pt only ate a few bites of lunch meal.     Past Medical History: SLE, lupus cerebritis, RA, Raynaud's syndrome, hyperparathyroidism, adrenal insufficiency, COPD, DM, HTN, HLD, atrial fibrillation (not on anticoagulation), CKD, pacemaker, seizures, psychosis   Surgical History   has a past surgical history that includes Eye surgery (03/06/2015); Total hip arthroplasty (01/29/2015); Cholecystectomy (01/29/2015); Other surgical history (01/29/2015); Other surgical history (01/29/2015); Ankle surgery (01/29/2015); Foot surgery  "(01/29/2015); MR angio head wo IV contrast (7/26/2013); MR angio neck wo IV contrast (7/26/2013); CT guided percutaneous biopsy bone deep (5/4/2021); MR angio head wo IV contrast (9/17/2021); MR angio neck wo IV contrast (9/17/2021); MR angio neck wo IV contrast (3/25/2023); MR angio head wo IV contrast (3/25/2023); and Cardiac electrophysiology procedure (Left, 5/17/2024).       Nutrition History:  Food and Nutrient History: 10/7: Pt remains lethargic; bridled dobbhoff remains in place and EN at goal of 55mL/hr. Water flushes increased to 300mL 6x/day 10/7.  10/1: Pt yelling out with hands on care otherwise does not follow directions or speak. Pt from [facility]--there on low concentrated sweets/mechanical soft with thin liquids. Spoke with [place] at facility ([phone])--states pt fed self and ate 50-75% of meals. Pt had been on Boost Glucose Control however the supplement was discontinued in 7/2024.  Vitamin/Herbal Supplement Use: home meds include melatonin, SSI, cortef, cellcept  Food Allergies/Intolerances:  None  GI Symptoms: None  Oral Problems: Chewing difficulty, Swallowing difficulty, and on mechanical soft diet PTA       Anthropometrics:  Height: 180.3 cm (5' 11\")   Weight: 87.3 kg (192 lb 7.4 oz)   BMI (Calculated): 26.85   Admit weight 84.7kg   IBW: 70.5kg       Weight History:   10/7: 88.7kg--pt net + 4.254L as of 10/7 0700hrs  7/2024: 93.7kg 9.6% x3 months  6/2024: 95.7kg  4/2024: 90.9kg 6.8% x 6 months  10/2023: 94.6kg  Weight Change %: unintentional 9.6% x3 months. Pt at [facility] 7/15-7/30 with adrenal crisis with PPN 7/22-7/25 due to failed attempt at dobhoff placement (epitaxis). Pt was transferred to [facility] 7/30-8/2/2024 for rheumatologic evaluation with pt then more alert.     Significant Weight Loss: Yes  Interpretation of Weight Loss: >7.5% in 3 months0-10 (Numeric) Pain Score: 0 - No pain  N-PASS Pain/Agitation Score:  [0]      Nutrition Focused Physical Exam " Findings:  defer: limited as pt yells out with hands on care.   10/9 pt being fed lunch  Subcutaneous Fat Loss:   Orbital Fat Pads: Mild-Moderate (slight dark circles and slight hollowing)  Buccal Fat Pads: Mild-Moderate (flat cheeks, minimal bounce)  Triceps: Defer  Ribs: Defer  Muscle Wasting:  Temporalis: Mild-Moderate (slight depression)  Pectoralis (Clavicular Region): Mild-Moderate (some protrusion of clavicle)  Deltoid/Trapezius: Mild-Moderate (slight protrusion of acromion process)  Interosseous: Defer  Trapezius/Infraspinatus/Supraspinatus (Scapular Region): Defer  Quadriceps: Defer  Gastrocnemius: Defer  Edema:  Edema: +2 mild  Edema Location: BLE  Physical Findings:  Skin: Positive (stage 2 sacrum, MASD, dusky, L elbow stage 2, bruising)    Nutrition Significant Labs:  BMP Trend:   Results from last 7 days   Lab Units 10/09/24  0516 10/08/24  0444 10/07/24  0419 10/06/24  0425   GLUCOSE mg/dL 277* 344* 228* 223*   CALCIUM mg/dL 7.0* 7.3* 7.8* 8.0*   SODIUM mmol/L 145 146* 146* 145   POTASSIUM mmol/L 3.2* 3.1* 3.3* 3.9   CO2 mmol/L 27 27 26 24   CHLORIDE mmol/L 110* 112* 112* 111*   BUN mg/dL 34* 40* 48* 50*   CREATININE mg/dL 1.02 1.27* 1.69* 2.16*    , A1C:  Lab Results   Component Value Date    HGBA1C 6.8 (H) 06/04/2024   , BG POCT trend:   Results from last 7 days   Lab Units 10/09/24  1218 10/09/24  0624 10/08/24  2358 10/08/24  1712 10/08/24  1121   POCT GLUCOSE mg/dL 223* 243* 250* 324* 325*    , Liver Function Trend:        , Vit B12:   Lab Results   Component Value Date    VLODUMPV84 1,499 (H) 03/20/2024    , Folate:   Lab Results   Component Value Date    FOLATE >24.0 04/25/2024    , CF Vitamin Labs:   Lab Results   Component Value Date    VITD25 34 12/10/2022        Nutrition Specific Medications:  Scheduled medications  atorvastatin, 40 mg, nasogastric tube, Nightly  [Held by provider] budesonide, 0.5 mg, nebulization, BID  esomeprazole, 40 mg, nasogastric tube, Daily before  breakfast  influenza, 0.5 mL, intramuscular, During hospitalization  [Held by provider] furosemide, 40 mg, oral, Daily  heparin (porcine), 5,000 Units, subcutaneous, q8h  hydrocortisone sodium succinate, 25 mg, intravenous, q12h  insulin glargine, 10 Units, subcutaneous, Nightly  insulin lispro, 0-10 Units, subcutaneous, q6h  levETIRAcetam, 500 mg, nasogastric tube, BID  metoprolol tartrate, 25 mg, nasogastric tube, BID  [Held by provider] polyethylene glycol, 17 g, oral, Daily  risperiDONE, 1 mg, nasogastric tube, BID  sertraline, 25 mg, nasogastric tube, Daily      Continuous medications       PRN medications  PRN medications: dextrose, dextrose, glucagon, glucagon, ipratropium-albuteroL, loperamide     I/O:   Last BM Date: 10/09/24; Stool Appearance: Watery (10/09/24 1000)    Dietary Orders (From admission, onward)       Start     Ordered    10/09/24 1525  Oral nutritional supplements  Until discontinued        Comments: vanilla   Question Answer Comment   Deliver with Breakfast    Deliver with Lunch    Select supplement: Glucerna Shake        10/09/24 1525    10/08/24 1409  Enteral feeding WITH diet order Diet type: Regular; Texture: Pureed 4; Fluid consistency: Thin 0; Tube feeding formula: Vital 1.5; 55 (Titrate every 6 hours, start at 20); 300; Water; Tap water; Every 4 hours  Continuous        Comments: ONLY FEED WHEN ALERT. 1:1 assistance, slow rate, cue for swallow PRN   Question Answer Comment   Diet type Regular    Texture Pureed 4    Fluid consistency Thin 0    Tube feeding formula: Vital 1.5    Tube feeding continuous rate (mL/hr): 55 Titrate every 6 hours, start at 20   Tube feeding cyclic rate (mL/hr): 300    Flush type: Water    Water type: Tap water    Flush frequency: Every 4 hours        10/08/24 1410    09/30/24 1901  May Participate in Room Service With Assistance  Once        Question:  .  Answer:  Yes    09/30/24 1900                     Estimated Needs:   Total Energy Estimated Needs (kCal):   (1846-7075 (24-26kcal/kg of 84.7kg))     Total Protein Estimated Needs (g):  (92-113g (1.3-1.6g/kg of IBW 70.5kg))     Total Fluid Estimated Needs (mL):  (1mL/kcal/d or as per physician)           Nutrition Diagnosis   Malnutrition Diagnosis  Patient has Malnutrition Diagnosis: Yes  Diagnosis Status: Ongoing  Malnutrition Diagnosis: Moderate malnutrition related to chronic disease or condition  As Evidenced by: recurrent adrenal crisis with resulting inconsistent oral intakes due to altered mentation, 9.6% weight loss x3 months with moderate fat and muscle mass wasting.  Additional Assessment Information: 10/9 pt still tolerating EN at goal rate            Nutrition Interventions/Recommendations         Nutrition Prescription:  Individualized Nutrition Prescription Provided for : Diet: continue Regular oral diet w/pureed textures and thin liquids along w/EN via Corpak as ordered.        Nutrition Interventions:   Interventions: Enteral intake, Meals and snacks, Medical food supplement  Meals and Snacks: Texture-modified diet  Goal: will consume 50% of meals  Enteral Intake: Modify composition of enteral nutrition  Goal: Continue as ordered - Vital 1.5 goal of 55mL/hr to provide 1980kcal, 89g pro, and 1008mL formula free water or >75%of estimated kcal needs with 1.3g pro/kg IBW. Adjust water flush pending fluid needs--currently providing 300mL 6x/day for total water of 2808mL water (formula + flush).  Medical Food Supplement: Commercial beverage  Goal: will provide vanilla Glucerna Shakes at breakfast and lunch meals. 220kcal, 10g pro per 8oz.    Collaboration and Referral of Nutrition Care: Collaboration by nutrition professional with other providers  Goal: AMINAH Ayala    Nutrition Education:   10/4: pt not appropriate for education at present time.        Nutrition Monitoring and Evaluation   Food/Nutrient Related History Monitoring  Monitoring and Evaluation Plan: Energy intake, Enteral and parenteral nutrition  intake  Energy Intake: Estimated energy intake  Criteria: will meet >75% of estimated energy needs  Enteral and Parenteral Nutrition Intake: Enteral nutrition intake  Criteria: will continue to tolerate EN.    Body Composition/Growth/Weight History  Monitoring and Evaluation Plan: Weight  Weight: Measured weight  Criteria: daily wts    Biochemical Data, Medical Tests and Procedures  Monitoring and Evaluation Plan: Electrolyte/renal panel, Glucose/endocrine profile  Electrolyte and Renal Panel: Sodium, Potassium, Phosphorus, Magnesium, Creatinine  Criteria: WNR  Glucose/Endocrine Profile: Glucose, casual  Criteria: BG 70-180mg/dL    Nutrition Focused Physical Findings  Monitoring and Evaluation Plan: Skin  Skin: Impaired wound healing  Criteria: will promote skin healing         Time Spent (min): 45 minutes   Follow up 3-5 days  Last RD note 10/9/24

## 2024-10-09 NOTE — CONSULTS
Wound Care Consult     Visit Date: 10/9/2024      Patient Name: Bing Holliday         MRN: [id number]           YOB: 1960     Reason for Consult: FU wounds        Wound History: Present on admission     Pertinent Labs:   Albumin   Date Value Ref Range Status   10/09/2024 2.6 (L) 3.4 - 5.0 g/dL Final   04/29/2021 3.1 (L) 3.4 - 5.0 g/dL Final     Albumin, CSF   Date Value Ref Range Status   01/18/2024 25 0 - 35 mg/dL Final     Albumin Index   Date Value Ref Range Status   01/18/2024 10.7 (H) 0.0 - 9.0 ratio Final     Albumin, Serum   Date Value Ref Range Status   01/18/2024 2337 (L) 3500 - 5200 mg/dL Final       Wound Assessment:  Wound 07/30/24 Moisture Associated Skin Damage Back Lateral;Right (Active)   Wound Image   09/30/24 1602   Site Assessment Red;Yellow 10/01/24 1600   Wound Length (cm) 2.5 cm 09/30/24 1602   Wound Width (cm) 10 cm 09/30/24 1602   Wound Surface Area (cm^2) 25 cm^2 09/30/24 1602   Wound Depth (cm) 0.1 cm 09/30/24 1602   Wound Volume (cm^3) 2.5 cm^3 09/30/24 1602   Wound Healing % -[id number] 09/30/24 1602   Drainage Description None 09/30/24 1600   Drainage Amount Scant 10/06/24 0406   Dressing Open to air 10/09/24 0400       Wound 08/01/24 Pressure Injury Sacrum (Active)   Wound Image   09/30/24 1603   Site Assessment Red;Painful;Maceration 10/07/24 0406   Batsheva-Wound Assessment Red 10/07/24 0406   Pressure Injury Stage 2 10/06/24 2006   Wound Length (cm) 6 cm 09/30/24 1603   Wound Width (cm) 5.5 cm 09/30/24 1603   Wound Surface Area (cm^2) 33 cm^2 09/30/24 1603   Wound Depth (cm) 0.1 cm 09/30/24 1603   Wound Volume (cm^3) 3.3 cm^3 09/30/24 1603   Drainage Description Serosanguineous 10/07/24 0406   Drainage Amount Scant 10/07/24 0406   Dressing Foam 10/09/24 0400   Dressing Changed Changed 10/03/24 2330   Dressing Status Clean;Dry 10/02/24 0400       Wound 09/30/24 Pressure Injury Elbow Dorsal;Left (Active)   Wound Image   09/30/24 1606   Site Assessment White;Yellow 10/03/24  2330   Batsheva-Wound Assessment Clean;Dry;Intact 10/03/24 0400   Pressure Injury Stage 2 09/30/24 1600   Shape round 10/03/24 2330   Wound Length (cm) 1 cm 09/30/24 1606   Wound Width (cm) 1 cm 09/30/24 1606   Wound Surface Area (cm^2) 1 cm^2 09/30/24 1606   Wound Depth (cm) 0.1 cm 09/30/24 1606   Wound Volume (cm^3) 0.1 cm^3 09/30/24 1606   Margins Well-defined edges 10/03/24 2330   Drainage Description Yellow;Tan 10/03/24 2330   Drainage Amount Scant 10/03/24 2330   Dressing Foam 10/09/24 0400   Dressing Changed Changed 10/03/24 2330   Dressing Status Clean;Dry 10/09/24 0400       Wound 09/30/24 Leg Dorsal;Proximal;Right;Upper (Active)   Wound Image   09/30/24 2000   Margins Poorly defined 10/01/24 1600   Drainage Description Serosanguineous 10/06/24 0406   Drainage Amount Scant 10/06/24 0406   Dressing Open to air 10/09/24 0400   Dressing Changed Changed 10/03/24 2330   Dressing Status Clean;Dry 10/04/24 0345       Wound Team Summary Assessment: Spoke with nurses who are concerned new wounds vs wounds deteriorating. Pt is incontinent of stool, making it difficult per nurse to keep Mepilex in place. Wounds thigh, buttocks, anal area are MASD. The coccyx wound could be stag 2 vs friction. There is wound bed exposed sporadically , skin edges ragged as if from friction vs pressure.      Wound Team Plan: Place EHOB mattress. Continue to apply Triad cream to MASD. Mepilex to coccyx. If not able to keep on due to incontinence, use Triad cream. I will provide Calmoseptine cream to see if that works better than Triad.     Aretha Wise RN  10/9/2024  1:36 PM

## 2024-10-09 NOTE — CARE PLAN
SHIFT NOTE    S: Sepsis    B: JED, HLD, angelica, PAF, CVA, GERD, OA, lupus, UTI, COPD, CHF, CABG, dementia, DM, PM    A: Patient remains hds. No c/o pain. She seemed a bit more alert and oriented last night and was able to answer some questions. She remains on tube feeds and tolerating well. She had two watery, diarrhea-like bowel movements. She remains in room air and is sating well. Afib on tele- rate controlled.     R: Hourly rounding was performed and patient needs were met. Call light within reach. No other acute events, will continue to monitor.       The patient's goals for the shift include      Problem: Chronic Conditions and Co-morbidities  Goal: Patient's chronic conditions and co-morbidity symptoms are monitored and maintained or improved  10/9/2024 0712 by Malini Mathur RN  Outcome: Progressing  10/8/2024 2128 by Malini Mathur RN  Flowsheets (Taken 10/3/2024 2243 by Hailey Manley RN)  Care Plan - Patient's Chronic Conditions and Co-Morbidity Symptoms are Monitored and Maintained or Improved: Monitor and assess patient's chronic conditions and comorbid symptoms for stability, deterioration, or improvement     Problem: Fall/Injury  Goal: Verbalize understanding of risk factor reduction measures to prevent injury from fall in the home  Outcome: Progressing     Problem: Skin  Goal: Decreased wound size/increased tissue granulation at next dressing change  10/9/2024 0712 by Malini Mathur RN  Outcome: Progressing  10/8/2024 2128 by Malini Mathur RN  Flowsheets (Taken 10/3/2024 2243 by Hailey Manley RN)  Decreased wound size/increased tissue granulation at next dressing change:   Promote sleep for wound healing   Protective dressings over bony prominences     Problem: Skin  Goal: Participates in plan/prevention/treatment measures  10/9/2024 0712 by Malini Mathur RN  Outcome: Progressing  10/8/2024 2128 by Malini Mathur RN  Flowsheets (Taken 10/3/2024 2243 by Hailey Manley RN)  Participates in  plan/prevention/treatment measures: Elevate heels     Problem: Perfusion/Cardiac  Goal: Adequate perfusion to organs/extremities  10/9/2024 0712 by Malini Mathur RN  Outcome: Progressing  10/8/2024 2128 by Malini Mathur RN  Flowsheets (Taken 10/8/2024 2128)  Adequate perfusion to organs/extremities:   Advanced invasive monitoring   Minimally/non-invasive monitoring     Problem: Infection prevention/bleeding  Goal: No further progression of infection  10/9/2024 0712 by Malini Mathur RN  Outcome: Progressing  10/8/2024 2128 by Malini Mathur RN  Flowsheets (Taken 10/7/2024 2244)  No further progression of infection: Monitor infection progression       The clinical goals for the shift include Patient will remain hds by end of shift 10/9/24 at 0700.

## 2024-10-09 NOTE — CARE PLAN
Problem: Discharge Planning  Goal: Discharge to home or other facility with appropriate resources  Outcome: Progressing     Problem: Chronic Conditions and Co-morbidities  Goal: Patient's chronic conditions and co-morbidity symptoms are monitored and maintained or improved  Outcome: Progressing     Problem: Fall/Injury  Goal: Verbalize understanding of personal risk factors for fall in the hospital  Outcome: Progressing  Goal: Verbalize understanding of risk factor reduction measures to prevent injury from fall in the home  Outcome: Progressing     Problem: Skin  Goal: Decreased wound size/increased tissue granulation at next dressing change  Outcome: Progressing  Goal: Participates in plan/prevention/treatment measures  Outcome: Progressing  Goal: Promote skin healing  Outcome: Progressing     Problem: Infection prevention/bleeding  Goal: No further progression of infection  Outcome: Progressing     Problem: Perfusion/Cardiac  Goal: Adequate perfusion to organs/extremities  Outcome: Progressing  Goal: Hemodynamically stable  Outcome: Progressing  Goal: No cardiac arrhythmias  Outcome: Progressing     Problem: Nutrition  Goal: BG  mg/dL  Outcome: Progressing  Goal: Lab values WNL  Outcome: Progressing  Goal: Electrolytes WNL  Outcome: Progressing  Goal: Promote healing  Outcome: Progressing  Goal: Maintain stable weight  Outcome: Progressing     Problem: Diabetes  Goal: Maintain glucose levels >70mg/dl to <250mg/dl throughout shift  Outcome: Progressing   The patient's goals for the shift include      The clinical goals for the shift include pt will remain afebrile t/o the shift

## 2024-10-09 NOTE — NURSING NOTE
Received orders to transfer patient to . Report called to Lucy. Will add Lucy to secure chat with wound care.    Patient transferred at 1200 in bed with transport and RN.    Prior to transfer, on changing linens, wounds were photographed (in Media). Mepilex applied X 2 and air mattress topper in place per wound care.

## 2024-10-09 NOTE — PROGRESS NOTES
"Bing Holliday is a 63 y.o. female on day 9 of admission presenting with Sepsis, due to unspecified organism, unspecified whether acute organ dysfunction present (Multi).    Subjective   Patient seen and evaluated at bedside this morning.  No acute events overnight.  Patient condition continue to improve. Her mentation has improved compared to yesterday.  She was A&O x 3.  Patient can speak but but still weak.  She denied having any chest pain, shortness of breath, abdominal pain, dizziness, lightheadedness or chills.       Objective     Physical Exam  Vitals reviewed.   Constitutional:       General: She is not in acute distress.     Appearance: She is not ill-appearing.   HENT:      Head: Normocephalic and atraumatic.      Mouth/Throat:      Mouth: Mucous membranes are moist.   Eyes:      Pupils: Pupils are equal, round, and reactive to light.   Cardiovascular:      Rate and Rhythm: Tachycardia present. Rhythm irregular.      Pulses: Normal pulses.   Pulmonary:      Effort: Pulmonary effort is normal. No respiratory distress.      Breath sounds: No wheezing, rhonchi or rales.   Abdominal:      General: Abdomen is flat. There is no distension.      Palpations: Abdomen is soft.      Tenderness: There is no abdominal tenderness.   Musculoskeletal:      Right lower leg: No edema.      Left lower leg: No edema.   Skin:     General: Skin is warm and dry.   Neurological:      Mental Status: She is lethargic.      GCS: GCS eye subscore is 3. GCS verbal subscore is 2. GCS motor subscore is 5.       Last Recorded Vitals  Blood pressure 120/77, pulse 97, temperature 36 °C (96.8 °F), temperature source Temporal, resp. rate 18, height 1.803 m (5' 11\"), weight 87.3 kg (192 lb 7.4 oz), SpO2 95%.  Intake/Output last 3 Shifts:  I/O last 3 completed shifts:  In: 3010 (35.5 mL/kg) [NG/GT:3010]  Out: - (0 mL/kg)   Dosing Weight: 84.7 kg     Relevant Results                          Malnutrition Diagnosis Status: " Ongoing  Malnutrition Diagnosis: Moderate malnutrition related to chronic disease or condition  As Evidenced by: recurrent adrenal crisis with resulting inconsistent oral intakes due to altered mentation, 9.6% weight loss x3 months with moderate fat and muscle mass wasting.  I agree with the dietitian's malnutrition diagnosis.      Assessment/Plan   Assessment & Plan  Sepsis, due to unspecified organism, unspecified whether acute organ dysfunction present (Multi)    Lupus (systemic lupus erythematosus) (Multi)    Encephalopathy acute    Adrenal insufficiency (Multi)    Acute kidney injury (CMS-HCC)    Bing Holliday is a 63-year-old female transferred from ICU to step-down unit for further management of acute metabolic encephalopathy secondary to UTI and complicated by adrenal insufficiency.       #Acute metabolic encephalopathy 2/2 UTI and c/b adrenal insufficiency   #Lupus cerebritis  - Patient treated for septic shock in the ICU with pressors and Vancomycin and Rocephin for UTI.  Weaned off of pressors on 10/1 at 9pm with BP stable since.  Shock was complicated by adrenal insufficiency that was likely worsening in the setting of UTI.  Patient's shock has resolved.  AMS was improving on stress-dose steroids. Patient back to stress dose steroids.  - Urine culture grew ESBL Klebsiella pneumoniae/variicola.  Patient has finished her ertapenem antibiotic course.  - Lupus cerebritis has historically been controlled on CellCept and Cortef 10 mg AM/20 mg PM, fludrocortisone 0.1 mg every other day  - Patient had one positive blood culture growing Staph aureus/epidermidis/pettenkoferi.  Likely a contaminant; repeat cultures no growth   -Continue risperidone (in place of home Invega)     Plan:   - Stress-dose Solu-Cortef 50 mg is tapered to 25 mg BID  - Hold home CellCept in the setting of infection   - NG tube for home meds.  Corpak tube was placed on 10/1/2024.  - Patient was evaluated by speech but swallow study.   Patient tolerating p.o. trials of ice chips, thin liquid via cup, thin liquids via single straw sips without overt signs or symptoms of aspiration.  Additionally patient tolerating p.o. trials puree.  Patient failed trial of soft solid p.o. intake.  -Per ST recommendation patient diet can be advanced to puree and thin liquids but only when the patient is alert.  - Will continue nutrition/medication via NG tube for now and reassess when mentation improves.  -By the nursing staff patient was only able to take a couple of bites of food and did not want any more.  Will continue Corpak tube for now till improving oral intake.  -Increased Corpak tube fluid flushes from 200 mL to 300 mL.  -Palliative care has discussed goals of care with the patient's son who wished to give her a couple of more days in the hospital and if no improvement was observed he would consider hospice.  Otherwise patient will be discharged to SNF if improved  -Patient pre-CERT started yesterday at [facility] in [facility].     #Afib with RVR  - Tachycardia from yesterday has been well-controlled by home metoprolol tartrate 25 mg twice daily.  HR has been stable in the range of 90s and patient has been hemodynamically stable.      Plan:  Home Metoprolol tartrate 25 mg BID restarted     #Acute on Chronic Anemia  #Anemia of Chronic Disease  #Chronic Pancytopenia  - Hemoglobin dropped from 7.6 to 6.8 on 10/2.  Patient received 1 unit PRBC.  No signs of bleeding on exam.  Hgb now stable.    -Pancytopenia at baseline, but slightly worse in setting of septic shock.  -On 10/9/2024 hemoglobin level has been stable around 8.4    Plan:   - Continue to trend with daily CBC.       #Hypoglycemia? 2/2 poor oral intake -resolved   #Uncontrolled IDDM2  -Hx of labile sugars; insulin held due to hypoglycemia concerns with poor oral intake requiring NG tube.  Glucose is stable.  -Morning blood glucose level is 277.    Plan:  -Accuchecks  -Lantus dose was increased to 10  units to achieve better glycemic control.  -Will continue monitoring at this time with no further modification to the insulin regimen.     #JED on CKD 3  -Cr trending down at 1.02 today (baseline 1.4 -1.9). Etiology likely prerenal secondary to decreased perfusion from septic shock requiring pressors vs intrinsic renal.    -Obstructive etiology was ruled out with kidney US which did not show hydronephrosis or nephrolithiasis and bladder scan not suggestive of retention.    -Will get daily RFP   Plan:   - Trend Cr with daily RFP  - Continue water flushes to 300 q4H     # Diarrhea - improved  -likely abx-related. Stool studies pending.  -C. difficile came back negative  -Patient continued to have diarrhea.  Patient had 2 episodes of large liquidy diarrhea overnight and 2 over the day.  -Will continue monitoring for bowel movement frequency.  -Increase water flush to 300 every 4 hours    Chronic problems:   #Afib (on Heparin due to thrombocytopenia from Eliquis)  #CKD3  #HFpEF  #COPD  #GERD  #CAD  - continue home medications as appropriate (atorvastatin 40 mg, Keppra 500 mgm BID, Zoloft 25 mg)          DVT prophylaxis: Heparin/SCD  Diet: Enteral feeding through NG tube. Pureed 4  Consult: Infectious disease, palliative care  Code: DNR/DNI             Discussed with attending,  Sully Virk MD.   Internal Medicine Resident, PGY-1

## 2024-10-10 PROBLEM — G93.40 ENCEPHALOPATHY ACUTE: Status: RESOLVED | Noted: 2023-10-30 | Resolved: 2024-10-10

## 2024-10-10 PROBLEM — N17.9 ACUTE KIDNEY INJURY (CMS-HCC): Status: RESOLVED | Noted: 2024-10-04 | Resolved: 2024-10-10

## 2024-10-10 PROBLEM — A41.9 SEPSIS, DUE TO UNSPECIFIED ORGANISM, UNSPECIFIED WHETHER ACUTE ORGAN DYSFUNCTION PRESENT (MULTI): Status: RESOLVED | Noted: 2024-09-30 | Resolved: 2024-10-10

## 2024-10-10 LAB
ALBUMIN SERPL BCP-MCNC: 2.6 G/DL (ref 3.4–5)
ANION GAP SERPL CALC-SCNC: 11 MMOL/L (ref 10–20)
BUN SERPL-MCNC: 31 MG/DL (ref 6–23)
CALCIUM SERPL-MCNC: 7.3 MG/DL (ref 8.6–10.3)
CHLORIDE SERPL-SCNC: 109 MMOL/L (ref 98–107)
CO2 SERPL-SCNC: 27 MMOL/L (ref 21–32)
CREAT SERPL-MCNC: 0.93 MG/DL (ref 0.5–1.05)
EGFRCR SERPLBLD CKD-EPI 2021: 70 ML/MIN/1.73M*2
ERYTHROCYTE [DISTWIDTH] IN BLOOD BY AUTOMATED COUNT: 23.2 % (ref 11.5–14.5)
GLUCOSE BLD MANUAL STRIP-MCNC: 168 MG/DL (ref 74–99)
GLUCOSE BLD MANUAL STRIP-MCNC: 168 MG/DL (ref 74–99)
GLUCOSE BLD MANUAL STRIP-MCNC: 178 MG/DL (ref 74–99)
GLUCOSE BLD MANUAL STRIP-MCNC: 204 MG/DL (ref 74–99)
GLUCOSE BLD MANUAL STRIP-MCNC: 309 MG/DL (ref 74–99)
GLUCOSE SERPL-MCNC: 241 MG/DL (ref 74–99)
HCT VFR BLD AUTO: 29.6 % (ref 36–46)
HGB BLD-MCNC: 8.7 G/DL (ref 12–16)
MAGNESIUM SERPL-MCNC: 1.64 MG/DL (ref 1.6–2.4)
MCH RBC QN AUTO: 29.4 PG (ref 26–34)
MCHC RBC AUTO-ENTMCNC: 29.4 G/DL (ref 32–36)
MCV RBC AUTO: 100 FL (ref 80–100)
NRBC BLD-RTO: 3.3 /100 WBCS (ref 0–0)
PHOSPHATE SERPL-MCNC: 1.9 MG/DL (ref 2.5–4.9)
PLATELET # BLD AUTO: 88 X10*3/UL (ref 150–450)
POTASSIUM SERPL-SCNC: 3.1 MMOL/L (ref 3.5–5.3)
RBC # BLD AUTO: 2.96 X10*6/UL (ref 4–5.2)
SODIUM SERPL-SCNC: 144 MMOL/L (ref 136–145)
WBC # BLD AUTO: 9.5 X10*3/UL (ref 4.4–11.3)

## 2024-10-10 PROCEDURE — 2500000004 HC RX 250 GENERAL PHARMACY W/ HCPCS (ALT 636 FOR OP/ED)

## 2024-10-10 PROCEDURE — 2500000001 HC RX 250 WO HCPCS SELF ADMINISTERED DRUGS (ALT 637 FOR MEDICARE OP): Performed by: STUDENT IN AN ORGANIZED HEALTH CARE EDUCATION/TRAINING PROGRAM

## 2024-10-10 PROCEDURE — 2500000002 HC RX 250 W HCPCS SELF ADMINISTERED DRUGS (ALT 637 FOR MEDICARE OP, ALT 636 FOR OP/ED)

## 2024-10-10 PROCEDURE — 92526 ORAL FUNCTION THERAPY: CPT | Mod: GN | Performed by: SPEECH-LANGUAGE PATHOLOGIST

## 2024-10-10 PROCEDURE — 2500000001 HC RX 250 WO HCPCS SELF ADMINISTERED DRUGS (ALT 637 FOR MEDICARE OP)

## 2024-10-10 PROCEDURE — 84100 ASSAY OF PHOSPHORUS: CPT

## 2024-10-10 PROCEDURE — 82947 ASSAY GLUCOSE BLOOD QUANT: CPT

## 2024-10-10 PROCEDURE — 99239 HOSP IP/OBS DSCHRG MGMT >30: CPT | Performed by: STUDENT IN AN ORGANIZED HEALTH CARE EDUCATION/TRAINING PROGRAM

## 2024-10-10 PROCEDURE — 36415 COLL VENOUS BLD VENIPUNCTURE: CPT

## 2024-10-10 PROCEDURE — 83735 ASSAY OF MAGNESIUM: CPT

## 2024-10-10 PROCEDURE — 1200000002 HC GENERAL ROOM WITH TELEMETRY DAILY

## 2024-10-10 PROCEDURE — 85027 COMPLETE CBC AUTOMATED: CPT

## 2024-10-10 RX ORDER — LANOLIN ALCOHOL/MO/W.PET/CERES
400 CREAM (GRAM) TOPICAL DAILY
Status: DISCONTINUED | OUTPATIENT
Start: 2024-10-10 | End: 2024-10-11 | Stop reason: HOSPADM

## 2024-10-10 RX ORDER — POTASSIUM CHLORIDE 1.5 G/1.58G
40 POWDER, FOR SOLUTION ORAL ONCE
Status: COMPLETED | OUTPATIENT
Start: 2024-10-10 | End: 2024-10-10

## 2024-10-10 RX ORDER — HYDROCORTISONE 5 MG/1
10 TABLET ORAL EVERY EVENING
Status: DISCONTINUED | OUTPATIENT
Start: 2024-10-10 | End: 2024-10-11 | Stop reason: HOSPADM

## 2024-10-10 RX ORDER — HYDROCORTISONE 20 MG/1
20 TABLET ORAL EVERY MORNING
Status: DISCONTINUED | OUTPATIENT
Start: 2024-10-10 | End: 2024-10-11 | Stop reason: HOSPADM

## 2024-10-10 ASSESSMENT — COGNITIVE AND FUNCTIONAL STATUS - GENERAL
CLIMB 3 TO 5 STEPS WITH RAILING: TOTAL
DRESSING REGULAR LOWER BODY CLOTHING: TOTAL
TOILETING: TOTAL
MOVING FROM LYING ON BACK TO SITTING ON SIDE OF FLAT BED WITH BEDRAILS: TOTAL
CLIMB 3 TO 5 STEPS WITH RAILING: TOTAL
DRESSING REGULAR UPPER BODY CLOTHING: TOTAL
HELP NEEDED FOR BATHING: TOTAL
DRESSING REGULAR LOWER BODY CLOTHING: TOTAL
WALKING IN HOSPITAL ROOM: TOTAL
MOVING TO AND FROM BED TO CHAIR: TOTAL
MOVING TO AND FROM BED TO CHAIR: TOTAL
DAILY ACTIVITIY SCORE: 6
STANDING UP FROM CHAIR USING ARMS: TOTAL
DAILY ACTIVITIY SCORE: 6
TOILETING: TOTAL
PERSONAL GROOMING: TOTAL
STANDING UP FROM CHAIR USING ARMS: TOTAL
PERSONAL GROOMING: TOTAL
WALKING IN HOSPITAL ROOM: TOTAL
HELP NEEDED FOR BATHING: TOTAL
TURNING FROM BACK TO SIDE WHILE IN FLAT BAD: TOTAL
EATING MEALS: TOTAL
MOVING FROM LYING ON BACK TO SITTING ON SIDE OF FLAT BED WITH BEDRAILS: TOTAL
MOBILITY SCORE: 6
DRESSING REGULAR UPPER BODY CLOTHING: TOTAL
EATING MEALS: TOTAL
MOBILITY SCORE: 6
TURNING FROM BACK TO SIDE WHILE IN FLAT BAD: TOTAL

## 2024-10-10 ASSESSMENT — PAIN SCALES - GENERAL
PAINLEVEL_OUTOF10: 0 - NO PAIN
PAINLEVEL_OUTOF10: 0 - NO PAIN

## 2024-10-10 NOTE — NURSING NOTE
Discharge report called into [facility] ad spoke with Suni receiving nurse, IV accesses discontinued with tips intact.  time is 1900.

## 2024-10-10 NOTE — PROGRESS NOTES
10/10/24 1245   Discharge Planning   Type of Post Acute Facility Services Long term care   Expected Discharge Disposition Inter   Does the patient need discharge transport arranged? Yes   RoundTrip coordination needed? Yes   Has discharge transport been arranged? No   What day is the transport expected? 10/11/24     [facility] can accept the patient with the Corpac tube for tube feedings. Message sent to attending.

## 2024-10-10 NOTE — PROGRESS NOTES
Speech-Language Pathology                    Therapy Communication Note    Patient Name: Bing Holliday  MRN: [id number]  Department: [facility]  Room: [facility]A  Today's Date: 10/10/2024     Discipline: Speech Language Pathology    Missed Visit Reason: Patient declined PO intake, despite encouragement. She is very lethargic at this time. SLP spoke with RN. SLP team will follow-up. Thank you    Missed Time: Attempt

## 2024-10-10 NOTE — PROGRESS NOTES
Pt has a dc order and [facility] can accept back today.  Request to [facility] to send dc paperwork and arrange transport; confirmed for 7:00pm.  Facility, bedside RN, TCC and pt's son all aware.

## 2024-10-10 NOTE — DISCHARGE SUMMARY
"Discharge Diagnosis  Sepsis, due to unspecified organism, unspecified whether acute organ dysfunction present (Multi)    Issues Requiring Follow-Up  Acute metabolic encephalopathy secondary to UTI and complicated by adrenal insufficiency - mentation improved during hospitalization  Lupus cerebritis  Acute on chronic anemia    Discharge Meds     Medication List      CONTINUE taking these medications     acetaminophen 325 mg tablet; Commonly known as: Tylenol   atorvastatin 40 mg tablet; Commonly known as: Lipitor; Take 1 tablet (40   mg) by mouth once daily at bedtime.   budesonide 0.5 mg/2 mL nebulizer solution; Commonly known as: Pulmicort;   Take 2 mL (0.5 mg) by nebulization 2 times a day. Rinse mouth with water   after use to reduce aftertaste and incidence of candidiasis. Do not   swallow.   Dexcom G6  misc; Generic drug: blood-glucose meter,continuous;   Use as instructed   Dexcom G6 Sensor device; Generic drug: blood-glucose sensor; Use to   check sugars 3 times daily   Dexcom G6 Transmitter device; Generic drug: blood-glucose transmitter   device; Use as instructed   heparin (porcine) 5,000 unit/mL injection; Inject 1 mL (5,000 Units)   under the skin every 8 hours.   * hydrocortisone 10 mg tablet; Commonly known as: Cortef; Take 1 tablet   (10 mg) by mouth once daily in the evening.   * hydrocortisone 20 mg tablet; Commonly known as: Cortef; Take 1 tablet   (20 mg) by mouth once daily in the morning.   insulin lispro 100 unit/mL injection; Commonly known as: HumaLOG   insulin syringe,safety needle 29G X 1/2\" 0.5 mL syringe; Use to inject   1-4 times daily.   ipratropium-albuteroL 0.5-2.5 mg/3 mL nebulizer solution; Commonly known   as: Duo-Neb; Take 3 mL by nebulization every 4 hours if needed for   wheezing or shortness of breath.   levETIRAcetam 500 mg tablet; Commonly known as: Keppra; Take 1 tablet   (500 mg) by mouth 2 times a day.   lidocaine 4 % patch   melatonin 5 mg tablet; Take 1 tablet (5 " "mg) by mouth once daily at   bedtime.   metoprolol tartrate 25 mg tablet; Commonly known as: Lopressor; Take 1   tablet (25 mg) by mouth 2 times a day.   mycophenolate 250 mg capsule; Commonly known as: Cellcept; Take 4   capsules (1,000 mg) by mouth 2 times a day.   paliperidone 6 mg 24 hr tablet; Commonly known as: Invega   pantoprazole 40 mg EC tablet; Commonly known as: ProtoNix; Take 1 tablet   (40 mg) by mouth once daily in the morning. Take before meals. Do not   crush, chew, or split.   pen needle, diabetic 31 gauge x 5/16\" needle; Use to inject 1-4 times   daily as directed.   polyethylene glycol 17 gram packet; Commonly known as: Glycolax,   Miralax; Take 17 g by mouth once daily.   sertraline 25 mg tablet; Commonly known as: Zoloft  * This list has 2 medication(s) that are the same as other medications   prescribed for you. Read the directions carefully, and ask your doctor or   other care provider to review them with you.     STOP taking these medications     furosemide 40 mg tablet; Commonly known as: Lasix       Test Results Pending At Discharge  Pending Labs       No current pending labs.            Hospital Course  Bing Holliday is a 63-year-old female with a past medical history of lupus complicated by cerebritis (on CellCept and prednisone) and nephritis, CKD 3, adrenal insufficiency, HFpEF (EF 40-45% on 5/2024), sinus node dysfunction s/p pacemaker (5/17/2024), CAD (s/p CABG 2013), COPD, GERD, and Afib (switched from Eliquis to Heparin by her SNF due to thrombocytopenia) admitted for AMS secondary to septic shock in the setting of UTI and complicated by adrenal insufficiency.    She was admitted to the ICU for septic shock refractory to fluids and started on pressors. She was weaned off vasopressors on 10/1 and transferred to regular floor on 10/3. She was given stress-dose Solu-Cortef for shock in the setting of adrenal insufficiency.  UTI was treated with Rocephin and Vancomycin initially, but " switched to Ertapenem after culture grew ESBL Klebsiella.  An NG tube was placed for administration of home PO meds given her AMS.  At baseline her mentation fluctuates and at her best she is oriented to self, place, and time, and can respond fully to questioning.  AMS initially improved during admission but she continued to remain lethargic.  Decision was made to keep Corpak in place given her poor oral intake.  Ultimately, patient was deemed medically stable for discharge on 10/10.  She is planning on returning to [facility] for long-term care.    Pertinent Physical Exam At Time of Discharge  Physical Exam  Constitutional:       General: She is not in acute distress.     Appearance: Normal appearance. She is not toxic-appearing.   HENT:      Head: Normocephalic and atraumatic.      Mouth/Throat:      Mouth: Mucous membranes are moist.   Eyes:      Extraocular Movements: Extraocular movements intact.      Pupils: Pupils are equal, round, and reactive to light.   Cardiovascular:      Rate and Rhythm: Regular rhythm. Tachycardia present.      Heart sounds: No murmur heard.  Pulmonary:      Effort: Pulmonary effort is normal. No respiratory distress.      Breath sounds: Normal breath sounds. No wheezing, rhonchi or rales.   Abdominal:      General: Abdomen is flat. There is no distension.      Palpations: Abdomen is soft.      Tenderness: There is no abdominal tenderness.   Skin:     General: Skin is warm and dry.   Neurological:      Comments: Patient very lethargic on exam today and slow to answer questions. A&O x2.          Outpatient Follow-Up  Future Appointments   Date Time Provider Department Center   10/22/2024 10:30 AM Eun Arana, JIM-CNP [id number] Abhishek Hutchinson DO  Internal Medicine PGY-1 Resident

## 2024-10-10 NOTE — PROGRESS NOTES
Occupational Therapy                 Therapy Communication Note    Patient Name: Bing Holliday  MRN: [id number]  Department: [facility]  Room: [facility]-A  Today's Date: 10/10/2024     Discipline: Occupational Therapy    Comment: Received orders for OT eval 10/9. Completed chart review, and pt is a LTC resident at [facility]. Pt with plans to return when medically stable. Pt without any acute skilled therapy needs at this time. Will DC OT orders.

## 2024-10-10 NOTE — PROGRESS NOTES
Speech-Language Pathology    SLP Adult Inpatient Treatment     Patient Name: Bing Holliday  MRN: [id number]  Today's Date: 10/10/2024  Time Calculation  Start Time: 1533  Stop Time: 1551  Time Calculation (min): 18 min      Current Problem:   1. Sepsis, due to unspecified organism, unspecified whether acute organ dysfunction present (Multi)  Renal function panel    Magnesium      2. Hypoglycemia        3. Moderate protein-calorie malnutrition (Multi)  Oral nutritional supplements      4. Encephalopathy, unspecified type  Drain/Tube Care    Enteral feeding WITH diet order    CANCELED: Enteral feeding WITH diet order        SLP Assessment:  SLP TX Intervention Outcome: Making Progress Towards Goals  SLP Assessment Results: Oropharyngeal dysphagia  Prognosis: Good  Educated patient regarding updated POC per 10/8/14 ST session. Patient was assisted into an upright position, and oral care was provided via toothbrush/toothpaste. Following initial set up with toothbrush placed in R hand, pt adequately, although slowly, brushed her teeth. Patient required moderate verbal cues/assistance to brush on/below tongue and bilateral sulci.   Patient required varying cueing levels for safe swallow strategies (from independent to forced choice cues to need for a verbal model).   Patient tolerated PO trials of thin liquid x5  via cup (stated she wanted apple juice only)  without overt s/s of aspiration. Cup placed in R hand, patient then extended left hand as well to hold the cup. She required moderate to maximal assistance to hold cup, bring it up to lips and tilt cup downward for PO. The plan was to continue thin liquid trials via straw, however, patient declined all further PO at this time. It should be noted that prior to (and well after) PO, patient with what appeared to be a strong cough (though slightly congested). Recommend patient for continuation of puree and thin liquids, ONLY WHEN ALERT. Patient is known to ST from prior  admissions, and tends to demonstrate improvement in oropharyngeal swallow when alertness level improves. Will continue to assess to determine diet advancement as appropriate.  Treatment Tolerance: Patient tolerated treatment well  Medical Staff Made Aware: Yes  Barriers: Cognition, Comorbidities  Education Provided: Yes     Plan:  Inpatient/Swing Bed or Outpatient: Inpatient  Treatment/Interventions: Dysphagia management  SLP TX Plan: Continue Plan of Care  SLP Plan: Skilled SLP  SLP Frequency: 3x per week  Duration: 2 weeks  SLP Discharge Recommendations: Continue skilled SLP services at the next level of care  Next Treatment Priority: Diet tolerance, advance diet as tolerated  Discussed POC: Patient, Nursing  Patient/Caregiver Agreeable: Yes     Subjective  Patient initially lethargic and leaning toward the left in bed. Patient assisted to an upright/neutral position and was presented with a cool cloth to face. Patient able to rouse adequately for session with minimal to moderate verbal cues. Patient with a hypophonic vocal quality, but able to respond adequately to simple biographical questions this date.     Most Recent Visit:  SLP Received On: 10/08/24     General Visit Information:  Reason for Referral: Swallow evaluation  Referred By: Mynor Jennings DO  Past Medical History Relevant to Rehab: SLE, lupus cerebritis, RA, Raynaud's syndrome, hyperparathyroidism, adrenal insufficiency, COPD, DM, HTN, HLD, atrial fibrillation (not on anticoagulation), CKD, pacemaker, seizures, psychosis  Patient Seen During This Visit: Yes  Caregiver Feedback: RN reported patient was lethargic earlier this date.  Total Number of Visits : 4  Prior to Session Communication: Bedside nurse     Pain Assessment: 0-10  0-10 (Numeric) Pain Score: 0 - No pain     Objective  Goals (established 10/4/24):  1. Patient will complete further assessment of swallowing to determine PO diet advancement vs need for further assessment (i.e.,  MBSS).  GOAL MET. Patient participated in PO trials on 10/8/24, and was recommended for PO diet advancement (modified diet).     New Goals (updated 10/8/24):  1. Patient will tolerate baseline diet absent of overt s/s of aspiration in 90% of observed therapeutic trials.  Progressing. Although initially lethargic, pt able to rouse adequately to consume 5 boluses thin liquid via cup (following assistance to an upright/neutral position bedside) with no overt S/S aspiration. She declined further trials of PO at this time.   2. Patient will implement safe swallowing strategies to reduce risk of aspiration in 90% of trials given caregiver assistance/cueing as needed.  Progressing. Patient able to state one compensatory swallow technique (per her 10/4/24 clinical swallow evaluation) independently. Given forced choice cues, patient able to indicate one more. Verbal models were provided for the remainder.    Baseline Assessment:  Respiratory Status:  Room air  Patient Positioning: Upright in Bed      Therapeutic Swallow:  Therapeutic Swallow Intervention : PO Trials, Caregiver Education, Compensatory Strategies  Solid Diet Recommendations: Pureed/extremely thick  (IDDSI Level 4)  Liquid Diet Recommendations: Thin (IDDSI Level 0)  Swallow Comments: ONLY WHEN ALERT, 1:1 feeding assistance, slow rate, cue for swallow PRN    Inpatient  Education:  Learner patient  Barriers to Learning acuteness of illness barrier; cognitive limitations barrier  Method demonstration; verbal  Education - Topic ST provided patient education regarding role of ST, purpose of assessment, clinical impressions, goals of treatment, and plan of care. Patient verbalized questionable full comprehension, consistent with cognitive status. Education will be reinforced. ST further coordinated with RN regarding recommendations and precautions per this assessment, with RN verbalizing understanding.  Outcome Verbalized understanding and agreement; needs  review/reinforcement

## 2024-10-10 NOTE — PROGRESS NOTES
"Bing Holliday is a 63 y.o. female on day 10 of admission presenting with Sepsis, due to unspecified organism, unspecified whether acute organ dysfunction present (Multi).    Subjective   Patient seen and evaluated at bedside this morning.  No acute events overnight.  Patient was very lethargic on exam today, A&O x2. She was arousable but could not answer many questions. Appears to remain very week. Corpak remains in place.       Objective     Physical Exam  Vitals reviewed.   Constitutional:       General: She is not in acute distress.     Appearance: She is not ill-appearing.   HENT:      Head: Normocephalic and atraumatic.      Mouth/Throat:      Mouth: Mucous membranes are moist.   Eyes:      Pupils: Pupils are equal, round, and reactive to light.   Cardiovascular:      Rate and Rhythm: Tachycardia present. Rhythm irregular.      Pulses: Normal pulses.   Pulmonary:      Effort: Pulmonary effort is normal. No respiratory distress.      Breath sounds: No wheezing, rhonchi or rales.   Abdominal:      General: Abdomen is flat. There is no distension.      Palpations: Abdomen is soft.      Tenderness: There is no abdominal tenderness.   Musculoskeletal:      Right lower leg: No edema.      Left lower leg: No edema.   Skin:     General: Skin is warm and dry.   Neurological:      Mental Status: She is lethargic.         Last Recorded Vitals  Blood pressure 118/82, pulse 100, temperature 35.9 °C (96.6 °F), temperature source Temporal, resp. rate 18, height 1.803 m (5' 11\"), weight 92.3 kg (203 lb 7.8 oz), SpO2 97%.  Intake/Output last 3 Shifts:  I/O last 3 completed shifts:  In: 1570 (18.5 mL/kg) [P.O.:10; NG/GT:1560]  Out: 1 (0 mL/kg) [Stool:1]  Dosing Weight: 84.7 kg     Relevant Results             Malnutrition Diagnosis Status: Ongoing  Malnutrition Diagnosis: Moderate malnutrition related to chronic disease or condition  As Evidenced by: recurrent adrenal crisis with resulting inconsistent oral intakes due to " altered mentation, 9.6% weight loss x3 months with moderate fat and muscle mass wasting.  I agree with the dietitian's malnutrition diagnosis.      Assessment/Plan   Assessment & Plan  Sepsis, due to unspecified organism, unspecified whether acute organ dysfunction present (Multi)    Lupus (systemic lupus erythematosus) (Multi)    Encephalopathy acute    Adrenal insufficiency (Multi)    Acute kidney injury (CMS-HCC)    Bing Holliday is a 63-year-old female transferred from ICU to step-down unit for further management of acute metabolic encephalopathy secondary to UTI and complicated by adrenal insufficiency.       #Acute metabolic encephalopathy 2/2 UTI and c/b adrenal insufficiency   #Lupus cerebritis  - Patient treated for septic shock in the ICU with pressors and Vancomycin and Rocephin for UTI.  Weaned off of pressors on 10/1 at 9pm with BP stable since.  Shock was complicated by adrenal insufficiency that was likely worsening in the setting of UTI.  Patient's shock has resolved.  AMS was improving on stress-dose steroids. Patient back to stress dose steroids.  - Urine culture grew ESBL Klebsiella pneumoniae/variicola.  Patient has finished her ertapenem antibiotic course.  - Lupus cerebritis has historically been controlled on CellCept and Cortef 10 mg AM/20 mg PM, fludrocortisone 0.1 mg every other day  - Patient had one positive blood culture growing Staph aureus/epidermidis/pettenkoferi.  Likely a contaminant; repeat cultures no growth   -Continue risperidone (in place of home Invega)     Plan:   - Stress-dose Solu-Cortef 50 mg has been tapered back to patient's baseline on 10/10 - hydrocortisone 20mg every morning and hydrocortisone 10mg every evening  - Hold home CellCept in the setting of infection   - NG tube for home meds.  Corpak tube was placed on 10/1/2024, will keep this in place upon discharge.  - Patient was evaluated by speech but swallow study.  Per ST recommendation patient diet can be  advanced to puree and thin liquids but only when the patient is alert.  -Increased Corpak tube fluid flushes from 200 mL to 300 mL.  -Corpak feeding adjusted from continuous infusion to bolus feeds - 330cc every 4 hours  -Palliative care has discussed goals of care with the patient's son who wished to give her a couple of more days in the hospital and if no improvement was observed he would consider hospice.  Otherwise patient will be discharged to SNF if improved  -Patient pre-CERT started yesterday at [place] in [facility].     #Afib with RVR  - Tachycardia from yesterday has been well-controlled by home metoprolol tartrate 25 mg twice daily.  HR has been stable in the range of 90s and patient has been hemodynamically stable.      Plan:  Home Metoprolol tartrate 25 mg BID restarted     #Acute on Chronic Anemia  #Anemia of Chronic Disease  #Chronic Pancytopenia  - Hemoglobin dropped from 7.6 to 6.8 on 10/2.  Patient received 1 unit PRBC.  No signs of bleeding on exam.  Hgb now stable.    -Pancytopenia at baseline, but slightly worse in setting of septic shock.  -On 10/9/2024 hemoglobin level has been stable around 8.4    Plan:   - Continue to trend with daily CBC.       #Hypoglycemia? 2/2 poor oral intake -resolved   #Uncontrolled IDDM2  -Hx of labile sugars; insulin held due to hypoglycemia concerns with poor oral intake requiring NG tube.  Glucose is stable.  -Morning blood glucose level is 277.    Plan:  -Accuchecks  -Lantus dose was increased to 10 units to achieve better glycemic control.  -Will continue monitoring at this time with no further modification to the insulin regimen.     #JED on CKD 3  -Cr trending down at 1.02 today (baseline 1.4 -1.9). Etiology likely prerenal secondary to decreased perfusion from septic shock requiring pressors vs intrinsic renal.    -Obstructive etiology was ruled out with kidney US which did not show hydronephrosis or nephrolithiasis and bladder scan not suggestive of  retention.    -Will get daily RFP   Plan:   - Trend Cr with daily RFP  - Continue water flushes to 300 q4H     # Diarrhea - improved  -likely abx-related. Stool studies pending.  -C. difficile came back negative  -Diarrhea appears to have improved today, with last documented bowel movement on 10/9  -Will continue monitoring for bowel movement frequency.  -Increase water flush to 300 every 4 hours    Chronic problems:   #Afib (on Heparin due to thrombocytopenia from Eliquis)  #CKD3  #HFpEF  #COPD  #GERD  #CAD  - continue home medications as appropriate (atorvastatin 40 mg, Keppra 500 mgm BID, Zoloft 25 mg)      DVT prophylaxis: Heparin/SCD  Diet: Enteral feeding through NG tube. Pureed 4 - now basal feeding 330cc q4 hours  Consult: Infectious disease, palliative care  Code: DNR/DNI         Discussed with attending,  Beth Hutchinson DO  Internal Medicine Resident, PGY-1

## 2024-10-10 NOTE — PROGRESS NOTES
Physical Therapy                 Therapy Communication Note - Functional Screen    Patient Name: Bing Holliday  MRN: [id number]  Department: [facility] 3 S  Room: [facility]  Today's Date: 10/10/2024     Discipline: Physical Therapy    Comment:  PT orders received, chart reviewed.  Pt is LTC resident at [facility] and will return when medically stable.  Pt without any acute skilled therapy needs at this time.  Will DC PT orders.

## 2024-10-10 NOTE — DISCHARGE INSTRUCTIONS
You are hospitalized due to septic shock in the setting of your adrenal insufficiency.  Your hospital stay was complicated by reduced oral intake, necessitating placement of a Corpak (feeding) tube.    Please follow up with your primary care provider within 7 days for hospital follow-up. Please call to make this appointment.  Please follow up with your palliative care provider in 1-2 weeks. At this time, they can discuss removing the corpak. They can also discuss alternative feeding methods as needed.     Please take your medications as prescribed.  Your Lasix was discontinued on this hospital admission.  Please stop taking this medicine.    Please have labs checked in about one week. We have ordered an RFP and magnesium.    If you have any concerning symptoms, or worsening symptoms please call or return, such as chest pain, shortness of breath, new onset confusion, loss of sensation, or fever >103F.    Thank you for allowing us to participate in your medical care!    [place] [place]

## 2024-10-10 NOTE — CARE PLAN
The patient's goals for the shift include  remain injury free    The clinical goals for the shift include remain afebrile    Problem: Discharge Planning  Goal: Discharge to home or other facility with appropriate resources  Outcome: Progressing     Problem: Chronic Conditions and Co-morbidities  Goal: Patient's chronic conditions and co-morbidity symptoms are monitored and maintained or improved  Outcome: Progressing     Problem: Fall/Injury  Goal: Verbalize understanding of personal risk factors for fall in the hospital  Outcome: Progressing  Goal: Verbalize understanding of risk factor reduction measures to prevent injury from fall in the home  Outcome: Progressing     Problem: Skin  Goal: Decreased wound size/increased tissue granulation at next dressing change  Outcome: Progressing  Goal: Participates in plan/prevention/treatment measures  Outcome: Progressing  Goal: Promote skin healing  Outcome: Progressing     Problem: Infection prevention/bleeding  Goal: No further progression of infection  Outcome: Progressing     Problem: Perfusion/Cardiac  Goal: Adequate perfusion to organs/extremities  Outcome: Progressing  Goal: Hemodynamically stable  Outcome: Progressing  Goal: No cardiac arrhythmias  Outcome: Progressing     Problem: Nutrition  Goal: BG  mg/dL  Outcome: Progressing  Goal: Lab values WNL  Outcome: Progressing  Goal: Electrolytes WNL  Outcome: Progressing  Goal: Promote healing  Outcome: Progressing  Goal: Maintain stable weight  Outcome: Progressing     Problem: Diabetes  Goal: Maintain glucose levels >70mg/dl to <250mg/dl throughout shift  Outcome: Progressing

## 2024-10-11 ENCOUNTER — NURSING HOME VISIT (OUTPATIENT)
Dept: POST ACUTE CARE | Facility: EXTERNAL LOCATION | Age: 64
End: 2024-10-11
Payer: MEDICARE

## 2024-10-11 VITALS
BODY MASS INDEX: 28.49 KG/M2 | RESPIRATION RATE: 20 BRPM | TEMPERATURE: 97.3 F | DIASTOLIC BLOOD PRESSURE: 59 MMHG | HEART RATE: 104 BPM | SYSTOLIC BLOOD PRESSURE: 90 MMHG | HEIGHT: 71 IN | OXYGEN SATURATION: 97 % | WEIGHT: 203.48 LBS

## 2024-10-11 VITALS
WEIGHT: 188 LBS | BODY MASS INDEX: 26.22 KG/M2 | SYSTOLIC BLOOD PRESSURE: 108 MMHG | DIASTOLIC BLOOD PRESSURE: 76 MMHG | TEMPERATURE: 97.3 F | OXYGEN SATURATION: 96 % | RESPIRATION RATE: 18 BRPM | HEART RATE: 74 BPM

## 2024-10-11 DIAGNOSIS — M32.9 SYSTEMIC LUPUS ERYTHEMATOSUS, UNSPECIFIED SLE TYPE, UNSPECIFIED ORGAN INVOLVEMENT STATUS (MULTI): Chronic | ICD-10-CM

## 2024-10-11 DIAGNOSIS — M32.19 LUPUS CEREBRITIS (MULTI): ICD-10-CM

## 2024-10-11 DIAGNOSIS — M35.1 MIXED CONNECTIVE TISSUE DISEASE (MULTI): ICD-10-CM

## 2024-10-11 DIAGNOSIS — M05.741 RHEUMATOID ARTHRITIS INVOLVING BOTH HANDS WITH POSITIVE RHEUMATOID FACTOR (MULTI): ICD-10-CM

## 2024-10-11 DIAGNOSIS — E43 SEVERE PROTEIN-CALORIE MALNUTRITION (MULTI): ICD-10-CM

## 2024-10-11 DIAGNOSIS — G92.8 TOXIC METABOLIC ENCEPHALOPATHY: Primary | ICD-10-CM

## 2024-10-11 DIAGNOSIS — D61.818 PANCYTOPENIA: Chronic | ICD-10-CM

## 2024-10-11 DIAGNOSIS — I73.00 RAYNAUD'S DISEASE WITHOUT GANGRENE: ICD-10-CM

## 2024-10-11 DIAGNOSIS — E27.40 ADRENAL INSUFFICIENCY (MULTI): ICD-10-CM

## 2024-10-11 DIAGNOSIS — M05.742 RHEUMATOID ARTHRITIS INVOLVING BOTH HANDS WITH POSITIVE RHEUMATOID FACTOR (MULTI): ICD-10-CM

## 2024-10-11 DIAGNOSIS — G05.3 LUPUS CEREBRITIS (MULTI): ICD-10-CM

## 2024-10-11 DIAGNOSIS — M32.14 LUPUS NEPHRITIS (MULTI): ICD-10-CM

## 2024-10-11 LAB
GLUCOSE BLD MANUAL STRIP-MCNC: 67 MG/DL (ref 74–99)
GLUCOSE BLD MANUAL STRIP-MCNC: 74 MG/DL (ref 74–99)

## 2024-10-11 PROCEDURE — 99306 1ST NF CARE HIGH MDM 50: CPT | Performed by: INTERNAL MEDICINE

## 2024-10-11 PROCEDURE — 2500000004 HC RX 250 GENERAL PHARMACY W/ HCPCS (ALT 636 FOR OP/ED): Mod: JZ

## 2024-10-11 PROCEDURE — 82947 ASSAY GLUCOSE BLOOD QUANT: CPT

## 2024-10-11 NOTE — LETTER
Patient: Bing Holliday  : 1960    Encounter Date: 10/11/2024    Subjective  Patient ID: Bing Holliday is a 63 y.o. female who is long term resident being seen and evaluated for multiple medical problems.    HPI   63-year-old female patient returns from hospital due to septic shock from ESBL Klebsiella urinary tract infection resulting in severe metabolic encephalopathy.  The patient's severity of illness was felt to be magnified due to her underlying adrenal insufficiency, immune suppression, and mixed connective tissue disease.  The patient after a period of improvement was unable to eat and therefore Corpak feeding tube was placed for enteral tube feeding.  Lasix was stopped.  She was treated with a course of ertapenem for the Klebsiella urinary tract infection.  The hospital record reports the patient had improved mentation and was felt to be stable for transfer back to the extended care facility for ongoing long-term care.  She has a long history of what is likely mixed connective tissue disorder with lupus cerebritis, lupus nephritis, adrenal insufficiency, heart failure with preserved ejection fraction, pacemaker, coronary disease, COPD, gastroesophageal reflux disease, and paroxysmal atrial fibrillation currently not on anticoagulation secondary to significant thrombocytopenia.    Current medications:  Tylenol  Lipitor  Budesonide nebulizer  Heparin subcu  Cortef  Insulin  DuoNeb  Keppra  Lidocaine patch  Melatonin  Metoprolol  CellCept  Paliperidone  Pantoprazole  MiraLAX  Sertraline    Current laboratory examinations from October 10, 2024:  White blood cell 9.5  Hemoglobin 8.7  Platelet 88  Magnesium 1.64  Potassium 3.1  Bicarbonate 27  Creatinine 0.93  Albumin 2.6  Phosphorus 1.9    Review of Systems   Reason unable to perform ROS: The patient remains encephalopathic and unable to answer questions.       Objective  /76   Pulse 74   Temp 36.3 °C (97.3 °F)   Resp 18   Wt 85.3 kg (188  lb)   LMP  (LMP Unknown)   SpO2 96%   BMI 26.22 kg/m²     Physical Exam  Vitals reviewed.   Constitutional:       General: She is not in acute distress.     Appearance: She is ill-appearing. She is not toxic-appearing.      Comments: Frail, cachectic, very weak.    Cardiovascular:      Rate and Rhythm: Normal rate and regular rhythm.      Pulses: Normal pulses.      Heart sounds:      No gallop.   Pulmonary:      Breath sounds: Normal breath sounds. No wheezing, rhonchi or rales.   Abdominal:      General: Abdomen is flat. Bowel sounds are normal.      Palpations: Abdomen is soft.      Tenderness: There is no guarding or rebound.   Musculoskeletal:      Left lower leg: No edema.      Comments: diffusely decreased muscle mass     Skin:     Comments: Patient has a lower extremity venous stasis chronic changes.    Neurological:      Motor: Weakness present.      Coordination: Coordination abnormal.      Comments: The patient opens her eyes to voice and probably falls back to either sleep or in encephalopathic stupor.  There is otherwise little or no spontaneous movements observed.         Assessment/Plan  Problem List Items Addressed This Visit             ICD-10-CM    Pancytopenia (Chronic) D61.818    Lupus nephritis (Multi) M32.14    Systemic lupus erythematosus (Multi) (Chronic) M32.9    Adrenal insufficiency (Multi) E27.40    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) M05.741, M05.742    Raynaud's disease I73.00    Mixed connective tissue disease (Multi) M35.1    Toxic metabolic encephalopathy - Primary G92.8    Lupus cerebritis (Multi) M32.19, G05.3    Severe protein-calorie malnutrition (Multi) E43     A.  We will continue with restorative and supportive care as the patient tolerates    B.  Will continue with enteral feeding at this time however hopefully the patient encephalopathy will recover to the point where she can begin taking food and fluid naturally.    C.  If  the patient's current condition is the best that can be achieved after intensive hospitalization then it is felt to be unlikely that she will have any significant recovery in the future.  This because she has so much stacked against her with the multiplicity of comorbid conditions not the least of which is mixed connective tissue disease, chronic immunosuppression with CellCept, and what appears to be severe protein calorie malnutrition.    D.  This patient's prognosis is poor and she would certainly be an excellent candidate for palliative care and/or hospice should she and her family wish to move in that direction.        Electronically Signed By: Jose Mcginnis MD   10/14/24  5:46 PM

## 2024-10-11 NOTE — PROGRESS NOTES
Subjective   Patient ID: Bing Holliday is a 63 y.o. female who is long term resident being seen and evaluated for multiple medical problems.    HPI   63-year-old female patient returns from hospital due to septic shock from ESBL Klebsiella urinary tract infection resulting in severe metabolic encephalopathy.  The patient's severity of illness was felt to be magnified due to her underlying adrenal insufficiency, immune suppression, and mixed connective tissue disease.  The patient after a period of improvement was unable to eat and therefore Corpak feeding tube was placed for enteral tube feeding.  Lasix was stopped.  She was treated with a course of ertapenem for the Klebsiella urinary tract infection.  The hospital record reports the patient had improved mentation and was felt to be stable for transfer back to the extended care facility for ongoing long-term care.  She has a long history of what is likely mixed connective tissue disorder with lupus cerebritis, lupus nephritis, adrenal insufficiency, heart failure with preserved ejection fraction, pacemaker, coronary disease, COPD, gastroesophageal reflux disease, and paroxysmal atrial fibrillation currently not on anticoagulation secondary to significant thrombocytopenia.    Current medications:  Tylenol  Lipitor  Budesonide nebulizer  Heparin subcu  Cortef  Insulin  DuoNeb  Keppra  Lidocaine patch  Melatonin  Metoprolol  CellCept  Paliperidone  Pantoprazole  MiraLAX  Sertraline    Current laboratory examinations from October 10, 2024:  White blood cell 9.5  Hemoglobin 8.7  Platelet 88  Magnesium 1.64  Potassium 3.1  Bicarbonate 27  Creatinine 0.93  Albumin 2.6  Phosphorus 1.9    Review of Systems   Reason unable to perform ROS: The patient remains encephalopathic and unable to answer questions.       Objective   /76   Pulse 74   Temp 36.3 °C (97.3 °F)   Resp 18   Wt 85.3 kg (188 lb)   LMP  (LMP Unknown)   SpO2 96%   BMI 26.22 kg/m²     Physical  Exam  Vitals reviewed.   Constitutional:       General: She is not in acute distress.     Appearance: She is ill-appearing. She is not toxic-appearing.      Comments: Frail, cachectic, very weak.    Cardiovascular:      Rate and Rhythm: Normal rate and regular rhythm.      Pulses: Normal pulses.      Heart sounds:      No gallop.   Pulmonary:      Breath sounds: Normal breath sounds. No wheezing, rhonchi or rales.   Abdominal:      General: Abdomen is flat. Bowel sounds are normal.      Palpations: Abdomen is soft.      Tenderness: There is no guarding or rebound.   Musculoskeletal:      Left lower leg: No edema.      Comments: diffusely decreased muscle mass     Skin:     Comments: Patient has a lower extremity venous stasis chronic changes.    Neurological:      Motor: Weakness present.      Coordination: Coordination abnormal.      Comments: The patient opens her eyes to voice and probably falls back to either sleep or in encephalopathic stupor.  There is otherwise little or no spontaneous movements observed.         Assessment/Plan   Problem List Items Addressed This Visit             ICD-10-CM    Pancytopenia (Chronic) D61.818    Lupus nephritis (Multi) M32.14    Systemic lupus erythematosus (Multi) (Chronic) M32.9    Adrenal insufficiency (Multi) E27.40    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) M05.741, M05.742    Raynaud's disease I73.00    Mixed connective tissue disease (Multi) M35.1    Toxic metabolic encephalopathy - Primary G92.8    Lupus cerebritis (Multi) M32.19, G05.3    Severe protein-calorie malnutrition (Multi) E43     A.  We will continue with restorative and supportive care as the patient tolerates    B.  Will continue with enteral feeding at this time however hopefully the patient encephalopathy will recover to the point where she can begin taking food and fluid naturally.    C.  If the patient's current condition is the best that can be achieved after  intensive hospitalization then it is felt to be unlikely that she will have any significant recovery in the future.  This because she has so much stacked against her with the multiplicity of comorbid conditions not the least of which is mixed connective tissue disease, chronic immunosuppression with CellCept, and what appears to be severe protein calorie malnutrition.    D.  This patient's prognosis is poor and she would certainly be an excellent candidate for palliative care and/or hospice should she and her family wish to move in that direction.

## 2024-10-12 ENCOUNTER — HOSPITAL ENCOUNTER (EMERGENCY)
Facility: HOSPITAL | Age: 64
Discharge: HOME | End: 2024-10-13
Attending: EMERGENCY MEDICINE
Payer: MEDICARE

## 2024-10-12 ENCOUNTER — APPOINTMENT (OUTPATIENT)
Dept: RADIOLOGY | Facility: HOSPITAL | Age: 64
End: 2024-10-12
Payer: MEDICARE

## 2024-10-12 DIAGNOSIS — T85.598A OBSTRUCTION OF FEEDING TUBE, INITIAL ENCOUNTER: Primary | ICD-10-CM

## 2024-10-12 PROCEDURE — 74018 RADEX ABDOMEN 1 VIEW: CPT

## 2024-10-12 PROCEDURE — 99283 EMERGENCY DEPT VISIT LOW MDM: CPT

## 2024-10-12 PROCEDURE — 74018 RADEX ABDOMEN 1 VIEW: CPT | Performed by: RADIOLOGY

## 2024-10-12 PROCEDURE — 99284 EMERGENCY DEPT VISIT MOD MDM: CPT | Performed by: EMERGENCY MEDICINE

## 2024-10-12 ASSESSMENT — PAIN - FUNCTIONAL ASSESSMENT: PAIN_FUNCTIONAL_ASSESSMENT: 0-10

## 2024-10-12 ASSESSMENT — PAIN SCALES - GENERAL: PAINLEVEL_OUTOF10: 0 - NO PAIN

## 2024-10-13 ENCOUNTER — APPOINTMENT (OUTPATIENT)
Dept: RADIOLOGY | Facility: HOSPITAL | Age: 64
End: 2024-10-13
Payer: MEDICARE

## 2024-10-13 VITALS
DIASTOLIC BLOOD PRESSURE: 76 MMHG | WEIGHT: 190 LBS | RESPIRATION RATE: 16 BRPM | HEIGHT: 71 IN | OXYGEN SATURATION: 98 % | BODY MASS INDEX: 26.6 KG/M2 | SYSTOLIC BLOOD PRESSURE: 133 MMHG | HEART RATE: 85 BPM | TEMPERATURE: 96.2 F

## 2024-10-13 PROCEDURE — 74018 RADEX ABDOMEN 1 VIEW: CPT

## 2024-10-13 PROCEDURE — 74018 RADEX ABDOMEN 1 VIEW: CPT | Performed by: RADIOLOGY

## 2024-10-13 ASSESSMENT — LIFESTYLE VARIABLES
EVER FELT BAD OR GUILTY ABOUT YOUR DRINKING: NO
EVER HAD A DRINK FIRST THING IN THE MORNING TO STEADY YOUR NERVES TO GET RID OF A HANGOVER: NO
HAVE YOU EVER FELT YOU SHOULD CUT DOWN ON YOUR DRINKING: NO
HAVE PEOPLE ANNOYED YOU BY CRITICIZING YOUR DRINKING: NO
TOTAL SCORE: 0

## 2024-10-13 ASSESSMENT — PAIN SCALES - GENERAL: PAINLEVEL_OUTOF10: 0 - NO PAIN

## 2024-10-13 NOTE — ED PROVIDER NOTES
EMERGENCY DEPARTMENT ENCOUNTER      Pt Name: Bing Holliday  MRN: [id number]  Birthdate 1960  Date of evaluation: 10/12/2024    HISTORY OF PRESENT ILLNESS    Bing Holliday is an 63 y.o. female with history including lupus vasculitis, CKD stage III, adrenal insufficiency, HFpEF type 2 diabetes, COPD, GERD, hypertension CVA presenting to the emergency department for Corpak issue.  Staff at her facility were able to give her her medications this evening and then later on they noticed that the Corpak had been moved out couple inches.  They tried getting it to flush to make sure it still worked and were unsuccessful there.  Patient was sent in for evaluation.  Patient has no complaints at this time.      PAST MEDICAL HISTORY     Past Medical History:   Diagnosis Date    Abnormal kidney function 04/04/2023    Acute headache 03/10/2024    Acute iritis of right eye 07/24/2015    Acute low back pain 10/30/2023    Acute upper respiratory infection, unspecified 03/04/2020    Acute URI    Acute upper respiratory infection, unspecified 09/30/2015    URTI (acute upper respiratory infection)    Arthralgia of right knee 02/14/2024    Arthritis     Atypical facial pain 03/10/2024    Body mass index (BMI) 23.0-23.9, adult 10/15/2021    BMI 23.0-23.9, adult    Body mass index (BMI) 33.0-33.9, adult 03/04/2020    BMI 33.0-33.9,adult    Cardiomegaly 08/27/2013    Left ventricular hypertrophy    Chest pain 06/10/2022    Comment on above: Added by Problem List Migration; 2013-3-12; Moved to Suppressed Nov 25 2013 9:16PM; Comment on above: Added by Problem List Migration; 2013-3-12; Moved to Suppressed Nov 25 2013 9:16PM;    Chronic kidney disease, stage 3 unspecified (Multi) 07/02/2013    Chronic kidney disease, stage III (moderate)    Confusional state 03/10/2024    Contact with and (suspected) exposure to covid-19 04/04/2023    Delirium 03/10/2024    Disease of pericardium, unspecified 07/02/2013    Pericardial disease     Encounter for follow-up examination after completed treatment for conditions other than malignant neoplasm 10/06/2022    Hospital discharge follow-up    Generalized contraction of visual field, right eye 01/29/2015    Generalized contraction of visual field of right eye    Hearing loss 03/10/2024    History of cataract 03/10/2024    History of thrombocytopenia 03/10/2024    Comment on above: Added by Problem List Migration; 2013-7-2;    Homonymous bilateral field defects, right side 04/29/2016    Homonymous bilateral field defects of right side    Hypertensive chronic kidney disease with stage 1 through stage 4 chronic kidney disease, or unspecified chronic kidney disease 07/02/2013    Nephrosclerosis    Laceration without foreign body, left foot, initial encounter 07/03/2018    Foot laceration, left, initial encounter    Localized edema 03/10/2024    Localized, primary osteoarthritis 02/14/2024    Mass of skin 03/10/2024    Migraine with aura, not intractable, without status migrainosus 10/24/2022    Ocular migraine    Open wound 03/10/2024    Open wound of left foot 03/10/2024    Other conditions influencing health status 07/02/2013    Chronic Glomerulonephritis In Diseases Classified Elsewhere    Other conditions influencing health status 07/02/2013    Progressive Familial Myoclonic Epilepsy    Other conditions influencing health status 07/02/2013    Protein S Deficiency    Other conditions influencing health status 05/22/2015    Familial Combined Hyperlipidemia    Other conditions influencing health status 10/24/2022    IDDM (insulin dependent diabetes mellitus)    Other conditions influencing health status 03/14/2022    Diabetes mellitus, insulin dependent (IDDM), uncontrolled    Other long term (current) drug therapy 10/24/2022    Long-term use of Plaquenil    Overweight with body mass index (BMI) 25.0-29.9 03/10/2024    Pain of toe 03/10/2024    Personal history of COVID-19 04/04/2023    Personal history of  diseases of the blood and blood-forming organs and certain disorders involving the immune mechanism 07/02/2013    History of thrombocytopenia    Personal history of diseases of the skin and subcutaneous tissue 08/11/2015    History of foot ulcer    Personal history of nephrotic syndrome 07/02/2013    History of nephrotic syndrome    Personal history of other diseases of the circulatory system 08/27/2013    History of sinus tachycardia    Personal history of other diseases of the nervous system and sense organs     History of cataract    Personal history of other diseases of the respiratory system     History of bronchitis    Personal history of other infectious and parasitic diseases 07/02/2013    History of hepatitis    Personal history of other specified conditions     History of shortness of breath    Personal history of other specified conditions 08/27/2013    History of edema    Posterior epistaxis 03/10/2024    Puckering of macula, right eye 10/24/2022    ERM OD (epiretinal membrane, right eye)    Raynaud's syndrome without gangrene 07/02/2013    Raynaud's disease    Sinusitis 03/10/2024    Systemic lupus erythematosus, unspecified 07/24/2015    SLE (systemic lupus erythematosus)    Systemic lupus erythematosus, unspecified 07/24/2015    SLE (systemic lupus erythematosus)    Systemic lupus erythematosus, unspecified 07/24/2015    Systemic lupus    Trigeminal nerve disorder 10/04/2024    Type 2 diabetes mellitus with diabetic nephropathy 07/02/2013    Type 2 diabetes with nephropathy    Type 2 diabetes mellitus with mild nonproliferative diabetic retinopathy without macular edema, left eye 07/27/2015    Non-proliferative diabetic retinopathy, left eye    Type 2 diabetes mellitus with mild nonproliferative diabetic retinopathy without macular edema, unspecified eye 07/24/2015    Mild non proliferative diabetic retinopathy    Type 2 diabetes mellitus with proliferative diabetic retinopathy without macular edema,  right eye 07/27/2015    Proliferative diabetic retinopathy of right eye    Type 2 diabetes mellitus with proliferative diabetic retinopathy without macular edema, unspecified eye 07/24/2015    Diabetic proliferative retinopathy    Unspecified acute and subacute iridocyclitis 07/24/2015    Acute iritis, right eye    Unspecified open wound, left foot, sequela 07/03/2018    Wound, open, foot, left, sequela       SURGICAL HISTORY       Past Surgical History:   Procedure Laterality Date    ANKLE SURGERY  01/29/2015    Ankle Surgery    CARDIAC ELECTROPHYSIOLOGY PROCEDURE Left 5/17/2024    Procedure: PPM IMPLANT DUAL;  Surgeon: Antonio Rodriges MD;  Location: [facility];  Service: Electrophysiology;  Laterality: Left;  Pt. needs platelets and Prednisone prior to procedure    CHOLECYSTECTOMY  01/29/2015    Cholecystectomy    CT GUIDED PERCUTANEOUS BIOPSY BONE DEEP  5/4/2021    CT GUIDED PERCUTANEOUS BIOPSY BONE DEEP 5/4/2021 [facility]    EYE SURGERY  03/06/2015    Eye Surgery    FOOT SURGERY  01/29/2015    Foot Surgery    MR HEAD ANGIO WO IV CONTRAST  7/26/2013    MR HEAD ANGIO WO IV CONTRAST 7/26/2013 [facility]    MR HEAD ANGIO WO IV CONTRAST  9/17/2021    MR HEAD ANGIO WO IV CONTRAST 9/17/2021 [facility]    MR HEAD ANGIO WO IV CONTRAST  3/25/2023    MR HEAD ANGIO WO IV CONTRAST [facility]    MR NECK ANGIO WO IV CONTRAST  7/26/2013    MR NECK ANGIO WO IV CONTRAST 7/26/2013 [facility]    MR NECK ANGIO WO IV CONTRAST  9/17/2021    MR NECK ANGIO WO IV CONTRAST 9/17/2021 [facility]    MR NECK ANGIO WO IV CONTRAST  3/25/2023    MR NECK ANGIO WO IV CONTRAST [facility]    OTHER SURGICAL HISTORY  01/29/2015    Creation Of Pericardial Window    OTHER SURGICAL HISTORY  01/29/2015    Quadricepsplasty    TOTAL HIP ARTHROPLASTY  01/29/2015    Hip Replacement       CURRENT MEDICATIONS       Previous Medications    ACETAMINOPHEN (TYLENOL) 325 MG TABLET    Take 2 tablets (650 mg) by  "mouth every 4 hours if needed for mild pain (1 - 3) or fever (temp greater than 38.0 C).    ATORVASTATIN (LIPITOR) 40 MG TABLET    Take 1 tablet (40 mg) by mouth once daily at bedtime.    BLOOD-GLUCOSE SENSOR (DEXCOM G6 SENSOR) DEVICE    Use to check sugars 3 times daily    BUDESONIDE (PULMICORT) 0.5 MG/2 ML NEBULIZER SOLUTION    Take 2 mL (0.5 mg) by nebulization 2 times a day. Rinse mouth with water after use to reduce aftertaste and incidence of candidiasis. Do not swallow.    DEXCOM G4 PLATINUM  (DEXCOM G6 ) MISC    Use as instructed    DEXCOM G4 PLATINUM TRANSMITTER (DEXCOM G6 TRANSMITTER) DEVICE    Use as instructed    HEPARIN SODIUM,PORCINE (HEPARIN, PORCINE,) 5,000 UNIT/ML INJECTION    Inject 1 mL (5,000 Units) under the skin every 8 hours.    HYDROCORTISONE (CORTEF) 10 MG TABLET    Take 1 tablet (10 mg) by mouth once daily in the evening.    HYDROCORTISONE (CORTEF) 20 MG TABLET    Take 1 tablet (20 mg) by mouth once daily in the morning.    INSULIN LISPRO (HUMALOG) 100 UNIT/ML INJECTION    Inject 0-10 Units under the skin 3 times a day as needed for high blood sugar (before meals). Take as directed per insulin Sliding Scale instructions.  0-150 = 0 units  151-200 = 2 units  201-250 = 4 units  251-300 = 6 units  301-350 = 8 units  351-400 = 10 units  >400 = give 10 units and contact MD    INSULIN SYRINGE,SAFETYNEEDLE 29G X 1/2\" 0.5 ML SYRINGE    Use to inject 1-4 times daily.    IPRATROPIUM-ALBUTEROL (DUO-NEB) 0.5-2.5 MG/3 ML NEBULIZER SOLUTION    Take 3 mL by nebulization every 4 hours if needed for wheezing or shortness of breath.    LEVETIRACETAM (KEPPRA) 500 MG TABLET    Take 1 tablet (500 mg) by mouth 2 times a day.    LIDOCAINE 4 % PATCH    Place 1 patch on the skin once daily. Remove & discard patch within 12 hours or as directed by MD.    MELATONIN 5 MG TABLET    Take 1 tablet (5 mg) by mouth once daily at bedtime.    METOPROLOL TARTRATE (LOPRESSOR) 25 MG TABLET    Take 1 tablet (25 " "mg) by mouth 2 times a day.    MYCOPHENOLATE (CELLCEPT) 250 MG CAPSULE    Take 4 capsules (1,000 mg) by mouth 2 times a day.    PALIPERIDONE (INVEGA) 6 MG 24 HR TABLET    Take 1 tablet (6 mg) by mouth once daily in the morning. Do not crush, chew, or split.    PANTOPRAZOLE (PROTONIX) 40 MG EC TABLET    Take 1 tablet (40 mg) by mouth once daily in the morning. Take before meals. Do not crush, chew, or split.    PEN NEEDLE, DIABETIC 31 GAUGE X 5/16\" NEEDLE    Use to inject 1-4 times daily as directed.    POLYETHYLENE GLYCOL (GLYCOLAX, MIRALAX) 17 GRAM PACKET    Take 17 g by mouth once daily.    SERTRALINE (ZOLOFT) 25 MG TABLET    Take 1 tablet (25 mg) by mouth once daily.       ALLERGIES     Ace inhibitors, Hydroxychloroquine, Lisinopril, Sulfa (sulfonamide antibiotics), and Penicillins    FAMILY HISTORY       Family History   Problem Relation Name Age of Onset    Other (RENAL DISEASE) Mother          END STAGE    Other (CARDIAC DISORDER) Mother      Cataracts Mother      Stroke Mother      Diabetes Mother      Kidney disease Mother      Lupus Mother      Other (CARDIAC DISORDER) Father      COPD Father      Glaucoma Father      Hypertension Father      Sleep apnea Father      Other (CARDIAC DISORDER) Sister      Depression Sister      Kidney disease Sister      Sickle cell trait Sister      Sleep apnea Daughter          SOCIAL HISTORY       Social History     Socioeconomic History    Marital status:    Tobacco Use    Smoking status: Never     Passive exposure: Never    Smokeless tobacco: Never   Vaping Use    Vaping status: Never Used   Substance and Sexual Activity    Alcohol use: Not Currently     Comment: RARE    Drug use: Never    Sexual activity: Defer     Social Determinants of Health     Financial Resource Strain: Patient Unable To Answer (9/30/2024)    Overall Financial Resource Strain (CARDIA)     Difficulty of Paying Living Expenses: Patient unable to answer   Food Insecurity: Patient Unable To " Answer (9/30/2024)    Hunger Vital Sign     Worried About Running Out of Food in the Last Year: Patient unable to answer     Ran Out of Food in the Last Year: Patient unable to answer   Transportation Needs: Patient Unable To Answer (9/30/2024)    PRAPARE - Transportation     Lack of Transportation (Medical): Patient unable to answer     Lack of Transportation (Non-Medical): Patient unable to answer   Physical Activity: Inactive (7/15/2024)    Exercise Vital Sign     Days of Exercise per Week: 0 days     Minutes of Exercise per Session: 0 min   Stress: Patient Unable To Answer (7/15/2024)    [employer] [place] [employer] - Occupational Stress Questionnaire     Feeling of Stress : Patient unable to answer   Social Connections: Patient Unable To Answer (7/15/2024)    Social Connection and Isolation Panel [NHANES]     Frequency of Communication with Friends and Family: Patient unable to answer     Frequency of Social Gatherings with Friends and Family: Patient unable to answer     Attends Restorationism Services: Patient unable to answer     Active Member of Clubs or Organizations: Patient unable to answer     Attends Club or Organization Meetings: Patient unable to answer     Marital Status: Patient unable to answer   Intimate Partner Violence: Patient Unable To Answer (9/30/2024)    Humiliation, Afraid, Rape, and Kick questionnaire     Fear of Current or Ex-Partner: Patient unable to answer     Emotionally Abused: Patient unable to answer     Physically Abused: Patient unable to answer     Sexually Abused: Patient unable to answer   Housing Stability: Patient Unable To Answer (9/30/2024)    Housing Stability Vital Sign     Unable to Pay for Housing in the Last Year: Patient unable to answer     Number of Times Moved in the Last Year: 1     Homeless in the Last Year: Patient unable to answer       PHYSICAL EXAM       ED Triage Vitals   Temperature Heart Rate Respirations BP   10/12/24 2337 10/12/24 2337 10/12/24  2337 10/12/24 2337   35.7 °C (96.2 °F) 89 16 128/85      Pulse Ox Temp Source Heart Rate Source Patient Position   10/12/24 2337 10/12/24 2337 10/13/24 0233 10/12/24 2337   98 % Temporal Monitor Sitting      BP Location FiO2 (%)     10/12/24 2337 --     Left arm        Physical Exam  Vitals and nursing note reviewed.   Constitutional:       General: She is not in acute distress.     Appearance: She is well-developed.   HENT:      Head: Normocephalic and atraumatic.   Neck:      Comments: Corpak still bridled in the left nare  Cardiovascular:      Rate and Rhythm: Normal rate and regular rhythm.      Heart sounds: No murmur heard.  Pulmonary:      Effort: Pulmonary effort is normal. No respiratory distress.      Breath sounds: Normal breath sounds.   Abdominal:      Palpations: Abdomen is soft.      Tenderness: There is no abdominal tenderness.   Musculoskeletal:      Comments: Muscle atrophy to upper and lower extremities contractures of the hands   Skin:     General: Skin is warm and dry.      Capillary Refill: Capillary refill takes less than 2 seconds.   Neurological:      General: No focal deficit present.      Mental Status: She is alert. Mental status is at baseline.      Comments: Alert and oriented to self and place   Psychiatric:         Mood and Affect: Mood normal.          DIAGNOSTIC RESULTS     LABS:  Labs Reviewed - No data to display    All other labs were within normal range or not returned as of this dictation.    Imaging  XR abdomen 1 view   Final Result   The enteric tube projects over the region of the distal   stomach/proximal duodenum.             Signed by: Eitan Ji 10/13/2024 1:33 AM   Dictation workstation:   [id number]      XR abdomen 1 view    (Results Pending)        Procedures  Procedures     EMERGENCY DEPARTMENT COURSE/MDM:   Medical Decision Making  Bing Holliday is an 63 y.o. female with history including lupus vasculitis, CKD stage III, adrenal insufficiency, HFpEF type 2  diabetes, COPD, GERD, hypertension CVA presenting to the emergency department for Corpak issue.  Patient's Corpak is still in place.  X-ray confirmed that it was appropriately position.  We attempted to flush it here in the emergency department and were unsuccessful.  A new Corpak was placed this evening successfully flushed and placed and confirmed on x-ray imaging.  Corpak was bridled at 66 and patient discharged back to her facility.        ED Course as of 10/13/24 0332   Sun Oct 13, 2024   0016 Patient signed out at this time to Dr. Pelletier. [TL]      ED Course User Index  [TL] Idris Kline DO         Diagnoses as of 10/13/24 0332   Obstruction of feeding tube, initial encounter        External records reviewed: recent inpatient, clinic, and prior ED notes  Labs and Diagnostic imaging independently reviewed/interpreted by me.    Patient plan, care, lab results and imaging were all discussed with attending.    ED Medications administered this visit:  Medications - No data to display  New Prescriptions from this visit:    New Prescriptions    No medications on file       (Please note that portions of this note were completed with a voice recognition program.  Efforts were made to edit the dictations but occasionally words are mis-transcribed.)     Taylor Frankel DO  Resident  10/13/24 1860

## 2024-10-14 ENCOUNTER — TELEPHONE (OUTPATIENT)
Dept: POST ACUTE CARE | Facility: EXTERNAL LOCATION | Age: 64
End: 2024-10-14
Payer: MEDICARE

## 2024-10-14 PROBLEM — M32.19 LUPUS CEREBRITIS (MULTI): Status: ACTIVE | Noted: 2024-10-14

## 2024-10-14 PROBLEM — G92.8 TOXIC METABOLIC ENCEPHALOPATHY: Status: ACTIVE | Noted: 2024-10-14

## 2024-10-14 PROBLEM — G05.3 LUPUS CEREBRITIS (MULTI): Status: ACTIVE | Noted: 2024-10-14

## 2024-10-14 PROBLEM — E43 SEVERE PROTEIN-CALORIE MALNUTRITION (MULTI): Status: ACTIVE | Noted: 2024-10-14

## 2024-10-14 NOTE — TELEPHONE ENCOUNTER
Nitin MCGHEE  MS. GUTIERRES LOOKED TERRIBLE WHEN I SAW HER ON FRIDAY AFTER A STAY AT HOSPITAL FOR SEPTIC SHOCK FROM [facility].  SHE REMAINED WITH SEVERELY COMPROMISED MENTAL STATE/ENCEPHALOPATHY, AND NOW HAS CORPACK FOR NUTRITION/HYDRATION.  SHE IS CLEARLY DECLINING.   ASKED FOR ME TO CALL FAMILY, BUT I COULDN'T DO IT RIGHT THEN.  WONDERING IF YOU COULD TOUCH BASE WITH FAMILY AFTER YOU SEE HER.  SHE SHOULD BE ON HOSPICE AND IF NOT THAT PALLIATIVE CARE AT LEAST, THX

## 2024-10-15 ENCOUNTER — NURSING HOME VISIT (OUTPATIENT)
Dept: POST ACUTE CARE | Facility: EXTERNAL LOCATION | Age: 64
End: 2024-10-15
Payer: MEDICARE

## 2024-10-15 VITALS
OXYGEN SATURATION: 96 % | DIASTOLIC BLOOD PRESSURE: 81 MMHG | RESPIRATION RATE: 18 BRPM | SYSTOLIC BLOOD PRESSURE: 149 MMHG | HEART RATE: 80 BPM | TEMPERATURE: 97.3 F

## 2024-10-15 DIAGNOSIS — D46.9 MYELODYSPLASTIC SYNDROME, UNSPECIFIED (MULTI): ICD-10-CM

## 2024-10-15 DIAGNOSIS — N30.00 ACUTE CYSTITIS WITHOUT HEMATURIA: ICD-10-CM

## 2024-10-15 DIAGNOSIS — G05.3 LUPUS CEREBRITIS (MULTI): ICD-10-CM

## 2024-10-15 DIAGNOSIS — I48.0 PAF (PAROXYSMAL ATRIAL FIBRILLATION) (MULTI): ICD-10-CM

## 2024-10-15 DIAGNOSIS — R62.7 FAILURE TO THRIVE IN ADULT: ICD-10-CM

## 2024-10-15 DIAGNOSIS — E27.40 ADRENAL INSUFFICIENCY (MULTI): Primary | ICD-10-CM

## 2024-10-15 DIAGNOSIS — E11.42 DM TYPE 2 WITH DIABETIC PERIPHERAL NEUROPATHY: Chronic | ICD-10-CM

## 2024-10-15 DIAGNOSIS — M32.19 LUPUS CEREBRITIS (MULTI): ICD-10-CM

## 2024-10-15 PROCEDURE — 99310 SBSQ NF CARE HIGH MDM 45: CPT | Performed by: NURSE PRACTITIONER

## 2024-10-15 ASSESSMENT — ENCOUNTER SYMPTOMS
DIAPHORESIS: 0
CONFUSION: 0
VOMITING: 0
COUGH: 0
ACTIVITY CHANGE: 1
CONSTIPATION: 0
DIARRHEA: 0
JOINT SWELLING: 0
FEVER: 0
FATIGUE: 1
NAUSEA: 0
MYALGIAS: 0
APPETITE CHANGE: 1
SHORTNESS OF BREATH: 0
CHILLS: 0

## 2024-10-15 NOTE — ASSESSMENT & PLAN NOTE
She has had poor oral intake and was encephalopathic on readmission.  Mental status appears to fluctuate significantly and at times can not safely take oral.  Speech therapy will eval tomorrow.  Korpak was placed, concern for displacement of korpak and tube feeds held, kub completed confirming placement in distal stomach.    Tube feeds and water flushes have been resumed.

## 2024-10-15 NOTE — PROGRESS NOTES
Subjective   Bing Holliday is a 63 y.o. female Here to follow up on recent readmission.   HPI She returned to facility, was encephalopathic and was not able to tolerate oral intake.  Korpak was dislodged and had er visit over the weekend and korpak was replaced.  Last night she was found pulling at korpak and staff held tube feeds and water flushes, kub today confirmed placement in distal stomach, tube feeds have been resumed.    The patient  is moderately confused.   There are no family members present during time of visit.    she  denies any complaints when asked.   No concerns per staff.       Review of Systems   Constitutional:  Positive for activity change, appetite change and fatigue. Negative for chills, diaphoresis and fever.   Respiratory:  Negative for cough and shortness of breath.    Cardiovascular:  Negative for chest pain and leg swelling.   Gastrointestinal:  Negative for constipation, diarrhea, nausea and vomiting.   Musculoskeletal:  Negative for joint swelling and myalgias.   Psychiatric/Behavioral:  Negative for confusion.        Objective   /81   Pulse 80   Temp 36.3 °C (97.3 °F)   Resp 18   LMP  (LMP Unknown)   SpO2 96%     Physical Exam  Vitals reviewed.   Constitutional:       General: She is not in acute distress.     Appearance: She is ill-appearing. She is not toxic-appearing.      Comments: Frail, cachectic, very weak.    Cardiovascular:      Rate and Rhythm: Normal rate and regular rhythm.      Pulses: Normal pulses.      Heart sounds:      No gallop.   Pulmonary:      Breath sounds: Normal breath sounds. No wheezing, rhonchi or rales.   Abdominal:      General: Abdomen is flat. Bowel sounds are normal.      Palpations: Abdomen is soft.      Tenderness: There is no guarding or rebound.   Musculoskeletal:      Left lower leg: No edema.      Comments: diffusely decreased muscle mass     Skin:     Comments: Patient has a lower extremity venous stasis chronic changes.     Neurological:      Motor: Weakness present.      Coordination: Coordination abnormal.      Comments: She is alert and oriented times 2-3, poor historian, she follows simple verbal commands.           Assessment & Plan  Adrenal insufficiency (Multi)  On hydrocortisone.  Follow up with endocrinology for consideration of biphosphonates.   continue with current medical management    Lupus cerebritis (Multi)  Follow up with rheumatology.    Myelodysplastic syndrome, unspecified (Multi)  Follow up with Dr. Skaggs.    PAF (paroxysmal atrial fibrillation) (Multi)  On  metoprolol.  Rate controlled.  continue with current medical management.  Eliquis was discontinued in the hospital due to thrombocytopenia and rectal bleeding  with recommendation to follow up with cardiology for consideration of atrial appendage ablation/closure.   Follow up with cardiology.    Acute cystitis without hematuria  Klebsiella, completed atb.  Her mental status appears to fluctuate significantly.    DM type 2 with diabetic peripheral neuropathy  On ss coverage.    Continue to monitor and adjust meds based on clinical course.  Blood sugars reviewed, 100-300's.   Failure to thrive in adult  She has had poor oral intake and was encephalopathic on readmission.  Mental status appears to fluctuate significantly and at times can not safely take oral.  Speech therapy will eval tomorrow.  Korpak was placed, concern for displacement of korpak and tube feeds held, kub completed confirming placement in distal stomach.    Tube feeds and water flushes have been resumed.    labs/meds/orders reviewed  staff to monitor and notify for any changes.  Speech therapy to eval for appropriate diet orders  Very weak and debilitated  She appears high risk for complication, staff to closely monitor for any changes  Follow up with cardiology regarding previous discontinuation of eliquis and consideration of atrial appendage closure/Watchman  Follow up with neurology and  rheumatology  Potassium was low on discharge, 3.1, unclear if this was replaced.    Case discussed with erum Go, PT and

## 2024-10-15 NOTE — ASSESSMENT & PLAN NOTE
On ss coverage.    Continue to monitor and adjust meds based on clinical course.  Blood sugars reviewed, 100-300's.

## 2024-10-22 ENCOUNTER — APPOINTMENT (OUTPATIENT)
Dept: CARDIOLOGY | Facility: CLINIC | Age: 64
End: 2024-10-22
Payer: MEDICARE

## 2024-10-23 ENCOUNTER — NURSING HOME VISIT (OUTPATIENT)
Dept: POST ACUTE CARE | Facility: EXTERNAL LOCATION | Age: 64
End: 2024-10-23
Payer: MEDICARE

## 2024-10-23 VITALS
OXYGEN SATURATION: 96 % | WEIGHT: 191 LBS | RESPIRATION RATE: 18 BRPM | DIASTOLIC BLOOD PRESSURE: 54 MMHG | SYSTOLIC BLOOD PRESSURE: 124 MMHG | HEART RATE: 81 BPM | BODY MASS INDEX: 26.64 KG/M2 | TEMPERATURE: 97.3 F

## 2024-10-23 DIAGNOSIS — G05.3 LUPUS CEREBRITIS (MULTI): Primary | ICD-10-CM

## 2024-10-23 DIAGNOSIS — I10 BENIGN ESSENTIAL HTN: Chronic | ICD-10-CM

## 2024-10-23 DIAGNOSIS — E11.42 DM TYPE 2 WITH DIABETIC PERIPHERAL NEUROPATHY: Chronic | ICD-10-CM

## 2024-10-23 DIAGNOSIS — R62.7 FAILURE TO THRIVE IN ADULT: ICD-10-CM

## 2024-10-23 DIAGNOSIS — E27.40 ADRENAL INSUFFICIENCY (MULTI): ICD-10-CM

## 2024-10-23 DIAGNOSIS — M32.19 LUPUS CEREBRITIS (MULTI): Primary | ICD-10-CM

## 2024-10-23 DIAGNOSIS — N18.32 STAGE 3B CHRONIC KIDNEY DISEASE (MULTI): ICD-10-CM

## 2024-10-23 PROCEDURE — 99310 SBSQ NF CARE HIGH MDM 45: CPT | Performed by: NURSE PRACTITIONER

## 2024-10-23 NOTE — LETTER
Patient: Bing Holliday  : 1960    Encounter Date: 10/23/2024    Subjective  Bing Holliday is a 63 y.o. female Here to follow up on multiple medical issues  HPI She removed the korpak a few days ago.  She has had varied oral intake.  She and family met with hospice and they are not ready for hospice care at this time.    The patient  is moderately confused.   There are no family members present during time of visit.    she  denies any complaints when asked, is awake and appropraite at present, has periods of confusion.  No concerns per staff.       Review of Systems   Constitutional:  Positive for activity change, appetite change and fatigue. Negative for chills, diaphoresis and fever.   Respiratory:  Negative for cough and shortness of breath.    Cardiovascular:  Negative for chest pain and leg swelling.   Gastrointestinal:  Negative for constipation, diarrhea, nausea and vomiting.   Musculoskeletal:  Negative for joint swelling and myalgias.   Psychiatric/Behavioral:  Negative for confusion.        Objective  /54   Pulse 81   Temp 36.3 °C (97.3 °F)   Resp 18   Wt 86.6 kg (191 lb)   LMP  (LMP Unknown)   SpO2 96%   BMI 26.64 kg/m²     Physical Exam  Vitals reviewed.   Constitutional:       General: She is not in acute distress.     Appearance: She is ill-appearing. She is not toxic-appearing.      Comments: Frail, cachectic, very weak.    Cardiovascular:      Rate and Rhythm: Normal rate and regular rhythm.      Pulses: Normal pulses.      Heart sounds:      No gallop.   Pulmonary:      Breath sounds: Normal breath sounds. No wheezing, rhonchi or rales.   Abdominal:      General: Abdomen is flat. Bowel sounds are normal.      Palpations: Abdomen is soft.      Tenderness: There is no guarding or rebound.   Musculoskeletal:      Left lower leg: No edema.      Comments: diffusely decreased muscle mass     Skin:     Comments: Patient has a lower extremity venous stasis chronic changes.     Neurological:      Motor: Weakness present.      Coordination: Coordination abnormal.      Comments: She is alert and oriented times 2-3, poor historian, she follows simple verbal commands.           Assessment & Plan  Lupus cerebritis (Multi)  Follow up with rheumatology.    Adrenal insufficiency (Multi)  On hydrocortisone.  Follow up with endocrinology for consideration of biphosphonates.   continue with current medical management    Benign essential HTN  On metoprolol, likely for rate control with af.   Blood pressure acceptable.  Continue to monitor and adjust meds based on clinical course.    Stage 3b chronic kidney disease (Multi)  monitor with routine labs.    DM type 2 with diabetic peripheral neuropathy  On ss coverage.    Continue to monitor and adjust meds based on clinical course.  Blood sugars reviewed, 100-300's.   Failure to thrive in adult  She has had poor to varied oral intake and mental status appears to fluctuate significantly and at times can not safely take oral.  Speech therapy continues to be involbed.  Korpak was placed and she pulled it out a few days ago, there is significant concern for her ability to meet her hydration and nutritional needs, spoke with son over phone, family and Bing have met with hospice and they are not ready for hospice care at this time.  Will start calorie counts for 3 days and also check labs.  I discussed with son over the phone that if she is not meeting her hydration and nutritional needs then the decision will be either comfort/hopsice care or peg tube.         labs/meds/orders reviewed  staff to monitor and notify for any changes.  Very weak and debilitated  She appears high risk for complication, staff to closely monitor for any changes  Follow up with cardiology regarding previous discontinuation of eliquis and consideration of atrial appendage closure/Watchman  Follow up with neurology and rheumatology  Start calorie counts and check labs later this  week  Spoke with son López over the phone over concern regarding her ability to meet her hydration and nutritional needs, start calorie count.    Time for coordination of care was greater than 35 minutes [facility] chart review, visit and exam, discussion with nursing, therapy and ss staff along with phone conversation with son.         Electronically Signed By: ASIM Bennett   10/27/24  3:22 PM

## 2024-10-23 NOTE — PROGRESS NOTES
Subjective   Bing Holliday is a 63 y.o. female Here to follow up on multiple medical issues  HPI She removed the korpak a few days ago.  She has had varied oral intake.  She and family met with hospice and they are not ready for hospice care at this time.    The patient  is moderately confused.   There are no family members present during time of visit.    she  denies any complaints when asked, is awake and appropraite at present, has periods of confusion.  No concerns per staff.       Review of Systems   Constitutional:  Positive for activity change, appetite change and fatigue. Negative for chills, diaphoresis and fever.   Respiratory:  Negative for cough and shortness of breath.    Cardiovascular:  Negative for chest pain and leg swelling.   Gastrointestinal:  Negative for constipation, diarrhea, nausea and vomiting.   Musculoskeletal:  Negative for joint swelling and myalgias.   Psychiatric/Behavioral:  Negative for confusion.        Objective   /54   Pulse 81   Temp 36.3 °C (97.3 °F)   Resp 18   Wt 86.6 kg (191 lb)   LMP  (LMP Unknown)   SpO2 96%   BMI 26.64 kg/m²     Physical Exam  Vitals reviewed.   Constitutional:       General: She is not in acute distress.     Appearance: She is ill-appearing. She is not toxic-appearing.      Comments: Frail, cachectic, very weak.    Cardiovascular:      Rate and Rhythm: Normal rate and regular rhythm.      Pulses: Normal pulses.      Heart sounds:      No gallop.   Pulmonary:      Breath sounds: Normal breath sounds. No wheezing, rhonchi or rales.   Abdominal:      General: Abdomen is flat. Bowel sounds are normal.      Palpations: Abdomen is soft.      Tenderness: There is no guarding or rebound.   Musculoskeletal:      Left lower leg: No edema.      Comments: diffusely decreased muscle mass     Skin:     Comments: Patient has a lower extremity venous stasis chronic changes.    Neurological:      Motor: Weakness present.      Coordination: Coordination  abnormal.      Comments: She is alert and oriented times 2-3, poor historian, she follows simple verbal commands.           Assessment & Plan  Lupus cerebritis (Multi)  Follow up with rheumatology.    Adrenal insufficiency (Multi)  On hydrocortisone.  Follow up with endocrinology for consideration of biphosphonates.   continue with current medical management    Benign essential HTN  On metoprolol, likely for rate control with af.   Blood pressure acceptable.  Continue to monitor and adjust meds based on clinical course.    Stage 3b chronic kidney disease (Multi)  monitor with routine labs.    DM type 2 with diabetic peripheral neuropathy  On ss coverage.    Continue to monitor and adjust meds based on clinical course.  Blood sugars reviewed, 100-300's.   Failure to thrive in adult  She has had poor to varied oral intake and mental status appears to fluctuate significantly and at times can not safely take oral.  Speech therapy continues to be involbed.  Korpak was placed and she pulled it out a few days ago, there is significant concern for her ability to meet her hydration and nutritional needs, spoke with son over phone, family and Bing have met with hospice and they are not ready for hospice care at this time.  Will start calorie counts for 3 days and also check labs.  I discussed with son over the phone that if she is not meeting her hydration and nutritional needs then the decision will be either comfort/hopsice care or peg tube.         labs/meds/orders reviewed  staff to monitor and notify for any changes.  Very weak and debilitated  She appears high risk for complication, staff to closely monitor for any changes  Follow up with cardiology regarding previous discontinuation of eliquis and consideration of atrial appendage closure/Watchman  Follow up with neurology and rheumatology  Start calorie counts and check labs later this week  Spoke with son López over the phone over concern regarding her ability  to meet her hydration and nutritional needs, start calorie count.    Time for coordination of care was greater than 35 minutes [facility] chart review, visit and exam, discussion with nursing, therapy and ss staff along with phone conversation with son.

## 2024-10-25 ENCOUNTER — NURSING HOME VISIT (OUTPATIENT)
Dept: POST ACUTE CARE | Facility: EXTERNAL LOCATION | Age: 64
End: 2024-10-25
Payer: MEDICARE

## 2024-10-25 VITALS
OXYGEN SATURATION: 96 % | DIASTOLIC BLOOD PRESSURE: 78 MMHG | RESPIRATION RATE: 18 BRPM | BODY MASS INDEX: 26.78 KG/M2 | SYSTOLIC BLOOD PRESSURE: 138 MMHG | HEART RATE: 72 BPM | WEIGHT: 192 LBS

## 2024-10-25 DIAGNOSIS — J44.9 ADVANCED COPD (MULTI): Chronic | ICD-10-CM

## 2024-10-25 DIAGNOSIS — R63.4 WEIGHT LOSS, UNINTENTIONAL: ICD-10-CM

## 2024-10-25 DIAGNOSIS — M32.19 LUPUS CEREBRITIS (MULTI): ICD-10-CM

## 2024-10-25 DIAGNOSIS — E43 SEVERE PROTEIN-CALORIE MALNUTRITION (MULTI): Primary | ICD-10-CM

## 2024-10-25 DIAGNOSIS — G93.41 METABOLIC ENCEPHALOPATHY: ICD-10-CM

## 2024-10-25 DIAGNOSIS — G05.3 LUPUS CEREBRITIS (MULTI): ICD-10-CM

## 2024-10-25 DIAGNOSIS — M35.1 MIXED CONNECTIVE TISSUE DISEASE (MULTI): ICD-10-CM

## 2024-10-25 PROCEDURE — 99309 SBSQ NF CARE MODERATE MDM 30: CPT | Performed by: INTERNAL MEDICINE

## 2024-10-27 ASSESSMENT — ENCOUNTER SYMPTOMS
VOMITING: 0
NAUSEA: 0
JOINT SWELLING: 0
FATIGUE: 1
SHORTNESS OF BREATH: 0
DIARRHEA: 0
COUGH: 0
DIAPHORESIS: 0
APPETITE CHANGE: 1
MYALGIAS: 0
FEVER: 0
ACTIVITY CHANGE: 1
CONFUSION: 0
CONSTIPATION: 0
CHILLS: 0

## 2024-10-27 NOTE — ASSESSMENT & PLAN NOTE
monitor with routine labs.     I have reviewed and confirmed nurses' notes for patient's medications, allergies, medical history, and surgical history.

## 2024-10-27 NOTE — ASSESSMENT & PLAN NOTE
She has had poor to varied oral intake and mental status appears to fluctuate significantly and at times can not safely take oral.  Speech therapy continues to be involbed.  Korpak was placed and she pulled it out a few days ago, there is significant concern for her ability to meet her hydration and nutritional needs, spoke with son over phone, family and Bing have met with hospice and they are not ready for hospice care at this time.  Will start calorie counts for 3 days and also check labs.  I discussed with son over the phone that if she is not meeting her hydration and nutritional needs then the decision will be either comfort/hopsice care or peg tube.

## 2024-10-29 ENCOUNTER — APPOINTMENT (OUTPATIENT)
Dept: CARDIOLOGY | Facility: HOSPITAL | Age: 64
End: 2024-10-29
Payer: MEDICARE

## 2024-10-29 ENCOUNTER — APPOINTMENT (OUTPATIENT)
Dept: RADIOLOGY | Facility: HOSPITAL | Age: 64
End: 2024-10-29
Payer: MEDICARE

## 2024-10-29 ENCOUNTER — HOSPITAL ENCOUNTER (INPATIENT)
Facility: HOSPITAL | Age: 64
End: 2024-10-29
Attending: STUDENT IN AN ORGANIZED HEALTH CARE EDUCATION/TRAINING PROGRAM | Admitting: INTERNAL MEDICINE
Payer: MEDICARE

## 2024-10-29 DIAGNOSIS — T68.XXXA HYPOTHERMIA, INITIAL ENCOUNTER: Primary | ICD-10-CM

## 2024-10-29 DIAGNOSIS — E43 SEVERE PROTEIN-CALORIE MALNUTRITION (MULTI): ICD-10-CM

## 2024-10-29 DIAGNOSIS — E27.2 ADRENAL CRISIS (MULTI): ICD-10-CM

## 2024-10-29 PROBLEM — R63.4 WEIGHT LOSS, UNINTENTIONAL: Status: ACTIVE | Noted: 2024-10-29

## 2024-10-29 LAB
ALBUMIN SERPL BCP-MCNC: 3.1 G/DL (ref 3.4–5)
ALP SERPL-CCNC: 127 U/L (ref 33–136)
ALT SERPL W P-5'-P-CCNC: 26 U/L (ref 7–45)
ANION GAP SERPL CALC-SCNC: 17 MMOL/L (ref 10–20)
APPEARANCE UR: ABNORMAL
AST SERPL W P-5'-P-CCNC: 45 U/L (ref 9–39)
BACTERIA #/AREA URNS AUTO: ABNORMAL /HPF
BASOPHILS # BLD AUTO: 0.02 X10*3/UL (ref 0–0.1)
BASOPHILS NFR BLD AUTO: 0.3 %
BILIRUB SERPL-MCNC: 0.6 MG/DL (ref 0–1.2)
BILIRUB UR STRIP.AUTO-MCNC: NEGATIVE MG/DL
BUN SERPL-MCNC: 24 MG/DL (ref 6–23)
BURR CELLS BLD QL SMEAR: NORMAL
CALCIUM SERPL-MCNC: 8.9 MG/DL (ref 8.6–10.3)
CARDIAC TROPONIN I PNL SERPL HS: 12 NG/L (ref 0–13)
CARDIAC TROPONIN I PNL SERPL HS: 12 NG/L (ref 0–13)
CHLORIDE SERPL-SCNC: 112 MMOL/L (ref 98–107)
CO2 SERPL-SCNC: 22 MMOL/L (ref 21–32)
COLOR UR: ABNORMAL
CREAT SERPL-MCNC: 1.81 MG/DL (ref 0.5–1.05)
EGFRCR SERPLBLD CKD-EPI 2021: 31 ML/MIN/1.73M*2
EOSINOPHIL # BLD AUTO: 0.02 X10*3/UL (ref 0–0.7)
EOSINOPHIL NFR BLD AUTO: 0.3 %
ERYTHROCYTE [DISTWIDTH] IN BLOOD BY AUTOMATED COUNT: 22.6 % (ref 11.5–14.5)
GLUCOSE BLD MANUAL STRIP-MCNC: 89 MG/DL (ref 74–99)
GLUCOSE SERPL-MCNC: 116 MG/DL (ref 74–99)
GLUCOSE UR STRIP.AUTO-MCNC: NORMAL MG/DL
GRAN CASTS #/AREA UR COMP ASSIST: ABNORMAL /LPF
HCT VFR BLD AUTO: 32.1 % (ref 36–46)
HGB BLD-MCNC: 9.3 G/DL (ref 12–16)
HOLD SPECIMEN: NORMAL
HYALINE CASTS #/AREA URNS AUTO: ABNORMAL /LPF
HYPOCHROMIA BLD QL SMEAR: NORMAL
IMM GRANULOCYTES # BLD AUTO: 0.18 X10*3/UL (ref 0–0.7)
IMM GRANULOCYTES NFR BLD AUTO: 3.1 % (ref 0–0.9)
KETONES UR STRIP.AUTO-MCNC: NEGATIVE MG/DL
LACTATE SERPL-SCNC: 1 MMOL/L (ref 0.4–2)
LEUKOCYTE ESTERASE UR QL STRIP.AUTO: ABNORMAL
LYMPHOCYTES # BLD AUTO: 1.24 X10*3/UL (ref 1.2–4.8)
LYMPHOCYTES NFR BLD AUTO: 21.6 %
MAGNESIUM SERPL-MCNC: 1.59 MG/DL (ref 1.6–2.4)
MCH RBC QN AUTO: 29 PG (ref 26–34)
MCHC RBC AUTO-ENTMCNC: 29 G/DL (ref 32–36)
MCV RBC AUTO: 100 FL (ref 80–100)
MONOCYTES # BLD AUTO: 0.37 X10*3/UL (ref 0.1–1)
MONOCYTES NFR BLD AUTO: 6.4 %
MRSA DNA SPEC QL NAA+PROBE: NOT DETECTED
MUCOUS THREADS #/AREA URNS AUTO: ABNORMAL /LPF
NEUTROPHILS # BLD AUTO: 3.91 X10*3/UL (ref 1.2–7.7)
NEUTROPHILS NFR BLD AUTO: 68.3 %
NITRITE UR QL STRIP.AUTO: ABNORMAL
NRBC BLD-RTO: 1 /100 WBCS (ref 0–0)
OVALOCYTES BLD QL SMEAR: NORMAL
PH UR STRIP.AUTO: 6 [PH]
PHOSPHATE SERPL-MCNC: 5.8 MG/DL (ref 2.5–4.9)
PLATELET # BLD AUTO: 73 X10*3/UL (ref 150–450)
POLYCHROMASIA BLD QL SMEAR: NORMAL
POTASSIUM SERPL-SCNC: 4.5 MMOL/L (ref 3.5–5.3)
PROT SERPL-MCNC: 6.7 G/DL (ref 6.4–8.2)
PROT UR STRIP.AUTO-MCNC: ABNORMAL MG/DL
RBC # BLD AUTO: 3.21 X10*6/UL (ref 4–5.2)
RBC # UR STRIP.AUTO: ABNORMAL /UL
RBC #/AREA URNS AUTO: ABNORMAL /HPF
RBC MORPH BLD: NORMAL
SARS-COV-2 RNA RESP QL NAA+PROBE: NOT DETECTED
SCHISTOCYTES BLD QL SMEAR: NORMAL
SODIUM SERPL-SCNC: 146 MMOL/L (ref 136–145)
SP GR UR STRIP.AUTO: 1.01
T4 FREE SERPL-MCNC: 1.11 NG/DL (ref 0.61–1.12)
TSH SERPL-ACNC: 4 MIU/L (ref 0.44–3.98)
UROBILINOGEN UR STRIP.AUTO-MCNC: NORMAL MG/DL
WBC # BLD AUTO: 5.7 X10*3/UL (ref 4.4–11.3)
WBC #/AREA URNS AUTO: >50 /HPF

## 2024-10-29 PROCEDURE — 83605 ASSAY OF LACTIC ACID: CPT | Performed by: STUDENT IN AN ORGANIZED HEALTH CARE EDUCATION/TRAINING PROGRAM

## 2024-10-29 PROCEDURE — 36415 COLL VENOUS BLD VENIPUNCTURE: CPT | Performed by: STUDENT IN AN ORGANIZED HEALTH CARE EDUCATION/TRAINING PROGRAM

## 2024-10-29 PROCEDURE — 80053 COMPREHEN METABOLIC PANEL: CPT | Performed by: STUDENT IN AN ORGANIZED HEALTH CARE EDUCATION/TRAINING PROGRAM

## 2024-10-29 PROCEDURE — 93010 ELECTROCARDIOGRAM REPORT: CPT | Performed by: STUDENT IN AN ORGANIZED HEALTH CARE EDUCATION/TRAINING PROGRAM

## 2024-10-29 PROCEDURE — 84484 ASSAY OF TROPONIN QUANT: CPT | Performed by: STUDENT IN AN ORGANIZED HEALTH CARE EDUCATION/TRAINING PROGRAM

## 2024-10-29 PROCEDURE — 81001 URINALYSIS AUTO W/SCOPE: CPT | Performed by: STUDENT IN AN ORGANIZED HEALTH CARE EDUCATION/TRAINING PROGRAM

## 2024-10-29 PROCEDURE — 87040 BLOOD CULTURE FOR BACTERIA: CPT | Mod: STJLAB | Performed by: STUDENT IN AN ORGANIZED HEALTH CARE EDUCATION/TRAINING PROGRAM

## 2024-10-29 PROCEDURE — 2060000001 HC INTERMEDIATE ICU ROOM DAILY

## 2024-10-29 PROCEDURE — 99223 1ST HOSP IP/OBS HIGH 75: CPT | Performed by: INTERNAL MEDICINE

## 2024-10-29 PROCEDURE — 96365 THER/PROPH/DIAG IV INF INIT: CPT | Mod: 59

## 2024-10-29 PROCEDURE — 84443 ASSAY THYROID STIM HORMONE: CPT | Performed by: STUDENT IN AN ORGANIZED HEALTH CARE EDUCATION/TRAINING PROGRAM

## 2024-10-29 PROCEDURE — 87640 STAPH A DNA AMP PROBE: CPT | Performed by: STUDENT IN AN ORGANIZED HEALTH CARE EDUCATION/TRAINING PROGRAM

## 2024-10-29 PROCEDURE — 85025 COMPLETE CBC W/AUTO DIFF WBC: CPT | Performed by: STUDENT IN AN ORGANIZED HEALTH CARE EDUCATION/TRAINING PROGRAM

## 2024-10-29 PROCEDURE — 87635 SARS-COV-2 COVID-19 AMP PRB: CPT | Performed by: STUDENT IN AN ORGANIZED HEALTH CARE EDUCATION/TRAINING PROGRAM

## 2024-10-29 PROCEDURE — 99291 CRITICAL CARE FIRST HOUR: CPT | Performed by: STUDENT IN AN ORGANIZED HEALTH CARE EDUCATION/TRAINING PROGRAM

## 2024-10-29 PROCEDURE — 51702 INSERT TEMP BLADDER CATH: CPT

## 2024-10-29 PROCEDURE — 93005 ELECTROCARDIOGRAM TRACING: CPT

## 2024-10-29 PROCEDURE — 82530 CORTISOL FREE: CPT | Performed by: STUDENT IN AN ORGANIZED HEALTH CARE EDUCATION/TRAINING PROGRAM

## 2024-10-29 PROCEDURE — 71045 X-RAY EXAM CHEST 1 VIEW: CPT

## 2024-10-29 PROCEDURE — 83735 ASSAY OF MAGNESIUM: CPT | Performed by: STUDENT IN AN ORGANIZED HEALTH CARE EDUCATION/TRAINING PROGRAM

## 2024-10-29 PROCEDURE — 84100 ASSAY OF PHOSPHORUS: CPT | Performed by: STUDENT IN AN ORGANIZED HEALTH CARE EDUCATION/TRAINING PROGRAM

## 2024-10-29 PROCEDURE — 96366 THER/PROPH/DIAG IV INF ADDON: CPT | Mod: 59

## 2024-10-29 PROCEDURE — 99291 CRITICAL CARE FIRST HOUR: CPT | Mod: 25 | Performed by: STUDENT IN AN ORGANIZED HEALTH CARE EDUCATION/TRAINING PROGRAM

## 2024-10-29 PROCEDURE — 2500000004 HC RX 250 GENERAL PHARMACY W/ HCPCS (ALT 636 FOR OP/ED): Performed by: STUDENT IN AN ORGANIZED HEALTH CARE EDUCATION/TRAINING PROGRAM

## 2024-10-29 PROCEDURE — 3E0G76Z INTRODUCTION OF NUTRITIONAL SUBSTANCE INTO UPPER GI, VIA NATURAL OR ARTIFICIAL OPENING: ICD-10-PCS | Performed by: INTERNAL MEDICINE

## 2024-10-29 PROCEDURE — 87086 URINE CULTURE/COLONY COUNT: CPT | Mod: STJLAB | Performed by: STUDENT IN AN ORGANIZED HEALTH CARE EDUCATION/TRAINING PROGRAM

## 2024-10-29 PROCEDURE — 96368 THER/DIAG CONCURRENT INF: CPT | Mod: 59

## 2024-10-29 PROCEDURE — 96375 TX/PRO/DX INJ NEW DRUG ADDON: CPT | Mod: 59

## 2024-10-29 PROCEDURE — 84439 ASSAY OF FREE THYROXINE: CPT | Performed by: STUDENT IN AN ORGANIZED HEALTH CARE EDUCATION/TRAINING PROGRAM

## 2024-10-29 PROCEDURE — 82947 ASSAY GLUCOSE BLOOD QUANT: CPT

## 2024-10-29 PROCEDURE — 93010 ELECTROCARDIOGRAM REPORT: CPT | Performed by: INTERNAL MEDICINE

## 2024-10-29 PROCEDURE — 71045 X-RAY EXAM CHEST 1 VIEW: CPT | Performed by: STUDENT IN AN ORGANIZED HEALTH CARE EDUCATION/TRAINING PROGRAM

## 2024-10-29 RX ORDER — ACETAMINOPHEN 650 MG/1
650 SUPPOSITORY RECTAL EVERY 4 HOURS PRN
Status: DISCONTINUED | OUTPATIENT
Start: 2024-10-29 | End: 2024-11-01

## 2024-10-29 RX ORDER — DEXTROSE MONOHYDRATE 50 MG/ML
75 INJECTION, SOLUTION INTRAVENOUS CONTINUOUS
Status: ACTIVE | OUTPATIENT
Start: 2024-10-29 | End: 2024-10-31

## 2024-10-29 RX ORDER — ONDANSETRON 4 MG/1
4 TABLET, ORALLY DISINTEGRATING ORAL EVERY 8 HOURS PRN
Status: DISCONTINUED | OUTPATIENT
Start: 2024-10-29 | End: 2024-11-01

## 2024-10-29 RX ORDER — PANTOPRAZOLE SODIUM 40 MG/1
40 TABLET, DELAYED RELEASE ORAL
Status: DISCONTINUED | OUTPATIENT
Start: 2024-10-30 | End: 2024-11-01

## 2024-10-29 RX ORDER — METRONIDAZOLE 500 MG/100ML
500 INJECTION, SOLUTION INTRAVENOUS ONCE
Status: DISCONTINUED | OUTPATIENT
Start: 2024-10-29 | End: 2024-10-29

## 2024-10-29 RX ORDER — VANCOMYCIN 2 GRAM/500 ML IN 0.9 % SODIUM CHLORIDE INTRAVENOUS
2 ONCE
Status: DISCONTINUED | OUTPATIENT
Start: 2024-10-29 | End: 2024-10-29

## 2024-10-29 RX ORDER — LEVETIRACETAM 100 MG/ML
500 SOLUTION ORAL 2 TIMES DAILY
Status: DISCONTINUED | OUTPATIENT
Start: 2024-10-30 | End: 2024-10-30

## 2024-10-29 RX ORDER — MYCOPHENOLATE MOFETIL 250 MG/1
1000 CAPSULE ORAL 2 TIMES DAILY
Status: DISCONTINUED | OUTPATIENT
Start: 2024-10-30 | End: 2024-11-03

## 2024-10-29 RX ORDER — ACETAMINOPHEN 325 MG/1
650 TABLET ORAL EVERY 4 HOURS PRN
Status: DISCONTINUED | OUTPATIENT
Start: 2024-10-29 | End: 2024-11-02

## 2024-10-29 RX ORDER — MAGNESIUM SULFATE HEPTAHYDRATE 40 MG/ML
2 INJECTION, SOLUTION INTRAVENOUS ONCE
Status: COMPLETED | OUTPATIENT
Start: 2024-10-29 | End: 2024-10-29

## 2024-10-29 RX ORDER — ONDANSETRON HYDROCHLORIDE 2 MG/ML
4 INJECTION, SOLUTION INTRAVENOUS EVERY 8 HOURS PRN
Status: DISCONTINUED | OUTPATIENT
Start: 2024-10-29 | End: 2024-11-01

## 2024-10-29 RX ORDER — LEVETIRACETAM 100 MG/ML
500 SOLUTION ORAL 2 TIMES DAILY
COMMUNITY
End: 2024-11-06 | Stop reason: HOSPADM

## 2024-10-29 RX ORDER — ATORVASTATIN CALCIUM 40 MG/1
40 TABLET, FILM COATED ORAL NIGHTLY
Status: DISCONTINUED | OUTPATIENT
Start: 2024-10-30 | End: 2024-11-06 | Stop reason: HOSPADM

## 2024-10-29 RX ORDER — METOCLOPRAMIDE HYDROCHLORIDE 5 MG/ML
5 INJECTION INTRAMUSCULAR; INTRAVENOUS EVERY 6 HOURS PRN
Status: DISCONTINUED | OUTPATIENT
Start: 2024-10-29 | End: 2024-10-30

## 2024-10-29 RX ORDER — ACETAMINOPHEN 325 MG/1
650 TABLET ORAL EVERY 4 HOURS PRN
Status: DISCONTINUED | OUTPATIENT
Start: 2024-10-29 | End: 2024-11-01

## 2024-10-29 RX ORDER — HYDROCORTISONE 20 MG/1
20 TABLET ORAL EVERY MORNING
Status: DISCONTINUED | OUTPATIENT
Start: 2024-10-30 | End: 2024-11-06 | Stop reason: HOSPADM

## 2024-10-29 RX ORDER — CEFEPIME 1 G/50ML
2 INJECTION, SOLUTION INTRAVENOUS EVERY 8 HOURS
Status: DISCONTINUED | OUTPATIENT
Start: 2024-10-29 | End: 2024-10-29

## 2024-10-29 RX ORDER — HEPARIN SODIUM 5000 [USP'U]/ML
5000 INJECTION, SOLUTION INTRAVENOUS; SUBCUTANEOUS EVERY 8 HOURS
Status: DISCONTINUED | OUTPATIENT
Start: 2024-10-30 | End: 2024-11-06 | Stop reason: HOSPADM

## 2024-10-29 RX ORDER — BUDESONIDE 0.5 MG/2ML
0.5 INHALANT ORAL
Status: DISCONTINUED | OUTPATIENT
Start: 2024-10-30 | End: 2024-11-06 | Stop reason: HOSPADM

## 2024-10-29 RX ORDER — METOPROLOL TARTRATE 25 MG/1
25 TABLET, FILM COATED ORAL 2 TIMES DAILY
Status: DISCONTINUED | OUTPATIENT
Start: 2024-10-30 | End: 2024-11-02

## 2024-10-29 RX ORDER — IPRATROPIUM BROMIDE AND ALBUTEROL SULFATE 2.5; .5 MG/3ML; MG/3ML
3 SOLUTION RESPIRATORY (INHALATION) EVERY 4 HOURS PRN
Status: DISCONTINUED | OUTPATIENT
Start: 2024-10-29 | End: 2024-11-06 | Stop reason: HOSPADM

## 2024-10-29 RX ORDER — PANTOPRAZOLE SODIUM 40 MG/10ML
40 INJECTION, POWDER, LYOPHILIZED, FOR SOLUTION INTRAVENOUS
Status: DISCONTINUED | OUTPATIENT
Start: 2024-10-30 | End: 2024-11-01

## 2024-10-29 RX ORDER — ACETAMINOPHEN 160 MG/5ML
650 SOLUTION ORAL EVERY 4 HOURS PRN
Status: DISCONTINUED | OUTPATIENT
Start: 2024-10-29 | End: 2024-11-01

## 2024-10-29 RX ORDER — METOCLOPRAMIDE 5 MG/1
5 TABLET ORAL EVERY 6 HOURS PRN
Status: DISCONTINUED | OUTPATIENT
Start: 2024-10-29 | End: 2024-10-30

## 2024-10-29 RX ORDER — PANTOPRAZOLE SODIUM 40 MG/1
40 TABLET, DELAYED RELEASE ORAL
Status: DISCONTINUED | OUTPATIENT
Start: 2024-10-30 | End: 2024-10-29

## 2024-10-29 RX ORDER — HYDROCORTISONE 5 MG/1
10 TABLET ORAL EVERY EVENING
Status: DISCONTINUED | OUTPATIENT
Start: 2024-10-30 | End: 2024-11-06 | Stop reason: HOSPADM

## 2024-10-29 RX ORDER — SERTRALINE HYDROCHLORIDE 25 MG/1
25 TABLET, FILM COATED ORAL DAILY
Status: DISCONTINUED | OUTPATIENT
Start: 2024-10-30 | End: 2024-11-06 | Stop reason: HOSPADM

## 2024-10-29 RX ORDER — ACETAMINOPHEN 500 MG
5 TABLET ORAL NIGHTLY
Status: DISCONTINUED | OUTPATIENT
Start: 2024-10-30 | End: 2024-11-06 | Stop reason: HOSPADM

## 2024-10-29 ASSESSMENT — PAIN SCALES - GENERAL
PAINLEVEL_OUTOF10: 0 - NO PAIN

## 2024-10-29 ASSESSMENT — ENCOUNTER SYMPTOMS
CONFUSION: 0
NAUSEA: 0
VOMITING: 0
SHORTNESS OF BREATH: 0
MYALGIAS: 0
FATIGUE: 1
CHILLS: 0
DIAPHORESIS: 0
ACTIVITY CHANGE: 1
JOINT SWELLING: 0
CONSTIPATION: 0
APPETITE CHANGE: 1
DIARRHEA: 0
FEVER: 0
COUGH: 0

## 2024-10-29 ASSESSMENT — PAIN - FUNCTIONAL ASSESSMENT
PAIN_FUNCTIONAL_ASSESSMENT: 0-10
PAIN_FUNCTIONAL_ASSESSMENT: 0-10

## 2024-10-29 NOTE — ED PROVIDER NOTES
HPI   Chief Complaint   Patient presents with   • Appliance Placement       Patient sent from nursing facility for inability tolerate p.o. for the past several weeks.  There is been an ongoing discussion reportedly between the treatment team at her nursing facility regarding how to get nutrition into her.  She reportedly does not want to eat but also is full code and was sent to the Emergency Department for coordination of getting a potential feeding tube.  Family not at bedside during initial ED visit with the patient.  She is alert and oriented x 2.  She has no acute complaints stating that she does not have any abdominal pain had any recent vomiting or diarrhea.  I did speak to the nurse caring for her at [facility] who confirmed that the patient has not had any other GI symptoms and they have attempted 4 different Corpak in the past however the patient has removed them sequentially.  Nursing reports that she has had normal vital signs and no other acute concerns.  Per nursing facility she has not had any coordination with the surgical team at this point for placement of PEG tube.  She is a full code.              Patient History   Past Medical History:   Diagnosis Date   • Abnormal kidney function 04/04/2023   • Acute headache 03/10/2024   • Acute iritis of right eye 07/24/2015   • Acute low back pain 10/30/2023   • Acute upper respiratory infection, unspecified 03/04/2020    Acute URI   • Acute upper respiratory infection, unspecified 09/30/2015    URTI (acute upper respiratory infection)   • Arthralgia of right knee 02/14/2024   • Arthritis    • Atypical facial pain 03/10/2024   • Body mass index (BMI) 23.0-23.9, adult 10/15/2021    BMI 23.0-23.9, adult   • Body mass index (BMI) 33.0-33.9, adult 03/04/2020    BMI 33.0-33.9,adult   • Cardiomegaly 08/27/2013    Left ventricular hypertrophy   • Chest pain 06/10/2022    Comment on above: Added by Problem List Migration; 2013-3-12; Moved to [facility]  Nov 25 2013 9:16PM; Comment on above: Added by Problem List Migration; 2013-3-12; Moved to Suppressed Nov 25 2013 9:16PM;   • Chronic kidney disease, stage 3 unspecified (Multi) 07/02/2013    Chronic kidney disease, stage III (moderate)   • Confusional state 03/10/2024   • Contact with and (suspected) exposure to covid-19 04/04/2023   • Delirium 03/10/2024   • Disease of pericardium, unspecified 07/02/2013    Pericardial disease   • Encounter for follow-up examination after completed treatment for conditions other than malignant neoplasm 10/06/2022    Hospital discharge follow-up   • Generalized contraction of visual field, right eye 01/29/2015    Generalized contraction of visual field of right eye   • Hearing loss 03/10/2024   • History of cataract 03/10/2024   • History of thrombocytopenia 03/10/2024    Comment on above: Added by Problem List Migration; 2013-7-2;   • Homonymous bilateral field defects, right side 04/29/2016    Homonymous bilateral field defects of right side   • Hypertensive chronic kidney disease with stage 1 through stage 4 chronic kidney disease, or unspecified chronic kidney disease 07/02/2013    Nephrosclerosis   • Laceration without foreign body, left foot, initial encounter 07/03/2018    Foot laceration, left, initial encounter   • Localized edema 03/10/2024   • Localized, primary osteoarthritis 02/14/2024   • Mass of skin 03/10/2024   • Migraine with aura, not intractable, without status migrainosus 10/24/2022    Ocular migraine   • Open wound 03/10/2024   • Open wound of left foot 03/10/2024   • Other conditions influencing health status 07/02/2013    Chronic Glomerulonephritis In Diseases Classified Elsewhere   • Other conditions influencing health status 07/02/2013    Progressive Familial Myoclonic Epilepsy   • Other conditions influencing health status 07/02/2013    Protein S Deficiency   • Other conditions influencing health status 05/22/2015    Familial Combined Hyperlipidemia   •  Other conditions influencing health status 10/24/2022    IDDM (insulin dependent diabetes mellitus)   • Other conditions influencing health status 03/14/2022    Diabetes mellitus, insulin dependent (IDDM), uncontrolled   • Other long term (current) drug therapy 10/24/2022    Long-term use of Plaquenil   • Overweight with body mass index (BMI) 25.0-29.9 03/10/2024   • Pain of toe 03/10/2024   • Personal history of COVID-19 04/04/2023   • Personal history of diseases of the blood and blood-forming organs and certain disorders involving the immune mechanism 07/02/2013    History of thrombocytopenia   • Personal history of diseases of the skin and subcutaneous tissue 08/11/2015    History of foot ulcer   • Personal history of nephrotic syndrome 07/02/2013    History of nephrotic syndrome   • Personal history of other diseases of the circulatory system 08/27/2013    History of sinus tachycardia   • Personal history of other diseases of the nervous system and sense organs     History of cataract   • Personal history of other diseases of the respiratory system     History of bronchitis   • Personal history of other infectious and parasitic diseases 07/02/2013    History of hepatitis   • Personal history of other specified conditions     History of shortness of breath   • Personal history of other specified conditions 08/27/2013    History of edema   • Posterior epistaxis 03/10/2024   • Puckering of macula, right eye 10/24/2022    ERM OD (epiretinal membrane, right eye)   • Raynaud's syndrome without gangrene 07/02/2013    Raynaud's disease   • Sinusitis 03/10/2024   • Systemic lupus erythematosus, unspecified 07/24/2015    SLE (systemic lupus erythematosus)   • Systemic lupus erythematosus, unspecified 07/24/2015    SLE (systemic lupus erythematosus)   • Systemic lupus erythematosus, unspecified 07/24/2015    Systemic lupus   • Trigeminal nerve disorder 10/04/2024   • Type 2 diabetes mellitus with diabetic nephropathy  07/02/2013    Type 2 diabetes with nephropathy   • Type 2 diabetes mellitus with mild nonproliferative diabetic retinopathy without macular edema, left eye 07/27/2015    Non-proliferative diabetic retinopathy, left eye   • Type 2 diabetes mellitus with mild nonproliferative diabetic retinopathy without macular edema, unspecified eye 07/24/2015    Mild non proliferative diabetic retinopathy   • Type 2 diabetes mellitus with proliferative diabetic retinopathy without macular edema, right eye 07/27/2015    Proliferative diabetic retinopathy of right eye   • Type 2 diabetes mellitus with proliferative diabetic retinopathy without macular edema, unspecified eye 07/24/2015    Diabetic proliferative retinopathy   • Unspecified acute and subacute iridocyclitis 07/24/2015    Acute iritis, right eye   • Unspecified open wound, left foot, sequela 07/03/2018    Wound, open, foot, left, sequela     Past Surgical History:   Procedure Laterality Date   • ANKLE SURGERY  01/29/2015    Ankle Surgery   • CARDIAC ELECTROPHYSIOLOGY PROCEDURE Left 5/17/2024    Procedure: PPM IMPLANT DUAL;  Surgeon: Antonio Rodriges MD;  Location: [facility];  Service: Electrophysiology;  Laterality: Left;  Pt. needs platelets and Prednisone prior to procedure   • CHOLECYSTECTOMY  01/29/2015    Cholecystectomy   • CT GUIDED PERCUTANEOUS BIOPSY BONE DEEP  5/4/2021    CT GUIDED PERCUTANEOUS BIOPSY BONE DEEP 5/4/2021 [facility] CLINICAL LEGACY   • EYE SURGERY  03/06/2015    Eye Surgery   • FOOT SURGERY  01/29/2015    Foot Surgery   • MR HEAD ANGIO WO IV CONTRAST  7/26/2013    MR HEAD ANGIO WO IV CONTRAST 7/26/2013 [facility] CLINICAL LEGACY   • MR HEAD ANGIO WO IV CONTRAST  9/17/2021    MR HEAD ANGIO WO IV CONTRAST 9/17/2021 [facility]   • MR HEAD ANGIO WO IV CONTRAST  3/25/2023    MR HEAD ANGIO WO IV CONTRAST [facility]   • MR NECK ANGIO WO IV CONTRAST  7/26/2013    MR NECK ANGIO WO IV CONTRAST 7/26/2013 [facility] CLINICAL LEGACY   • MR NECK ANGIO WO IV  CONTRAST  9/17/2021    MR NECK ANGIO WO IV CONTRAST 9/17/2021 [facility]   • MR NECK ANGIO WO IV CONTRAST  3/25/2023    MR NECK ANGIO WO IV CONTRAST [facility]   • OTHER SURGICAL HISTORY  01/29/2015    Creation Of Pericardial Window   • OTHER SURGICAL HISTORY  01/29/2015    Quadricepsplasty   • TOTAL HIP ARTHROPLASTY  01/29/2015    Hip Replacement     Family History   Problem Relation Name Age of Onset   • Other (RENAL DISEASE) Mother          END STAGE   • Other (CARDIAC DISORDER) Mother     • Cataracts Mother     • Stroke Mother     • Diabetes Mother     • Kidney disease Mother     • Lupus Mother     • Other (CARDIAC DISORDER) Father     • COPD Father     • Glaucoma Father     • Hypertension Father     • Sleep apnea Father     • Other (CARDIAC DISORDER) Sister     • Depression Sister     • Kidney disease Sister     • Sickle cell trait Sister     • Sleep apnea Daughter       Social History     Tobacco Use   • Smoking status: Never     Passive exposure: Never   • Smokeless tobacco: Never   Vaping Use   • Vaping status: Never Used   Substance Use Topics   • Alcohol use: Not Currently     Comment: RARE   • Drug use: Never       Physical Exam   ED Triage Vitals   Temp Pulse Resp BP   -- -- -- --      SpO2 Temp src Heart Rate Source Patient Position   -- -- -- --      BP Location FiO2 (%)     -- --       Physical Exam  Vitals and nursing note reviewed.   Constitutional:       General: She is not in acute distress.     Appearance: She is well-developed. She is ill-appearing. She is not toxic-appearing.      Comments: Cachectic   HENT:      Head: Normocephalic and atraumatic.      Right Ear: Tympanic membrane and ear canal normal.      Left Ear: Tympanic membrane and ear canal normal.      Nose: No congestion or rhinorrhea.      Mouth/Throat:      Mouth: Mucous membranes are dry.      Pharynx: No oropharyngeal exudate or posterior oropharyngeal erythema.   Eyes:      General: No scleral icterus.        Right eye:  No discharge.         Left eye: No discharge.      Conjunctiva/sclera: Conjunctivae normal.   Cardiovascular:      Rate and Rhythm: Regular rhythm. Bradycardia present.      Pulses: Normal pulses.      Heart sounds: No murmur heard.     No gallop.   Pulmonary:      Effort: Pulmonary effort is normal. No respiratory distress.      Breath sounds: Normal breath sounds. No stridor. No wheezing, rhonchi or rales.   Abdominal:      General: Bowel sounds are normal. There is no distension.      Palpations: Abdomen is soft.      Tenderness: There is no abdominal tenderness. There is no guarding or rebound.   Musculoskeletal:         General: No swelling or deformity.      Cervical back: Neck supple. No rigidity.   Lymphadenopathy:      Cervical: No cervical adenopathy.   Skin:     General: Skin is dry.      Capillary Refill: Capillary refill takes less than 2 seconds.      Findings: No rash.      Comments: Cool   Neurological:      General: No focal deficit present.      Mental Status: She is alert. Mental status is at baseline. She is disoriented.      Cranial Nerves: No cranial nerve deficit.      Sensory: No sensory deficit.      Motor: Weakness (Generalized) present.   Psychiatric:         Mood and Affect: Mood normal.         Behavior: Behavior normal.           ED Course & MDM   ED Course as of 10/30/24 1449   Tue Oct 29, 2024   1812 Call placed to patient nursing facility to clarify plan of care and their concerns at this time.  [TL]   1819 Patient found to be hypothermic we will start warming blanket at this time. [TL]   1844 EKG obtained with significant baseline artifact.  No acute ischemic change appreciated.  Appears to be normal sinus rhythm with a ventricular rate that is appropriate at roughly 73 bpm.  Nursing instructed to obtain repeat.  Patient having significant tremor at this time due to hypothermia. [TL]   1846 Given hypothermia and small sacral wound septic workup to be initiated.  Montano catheter to be  placed for further monitoring given hypothermia.  External warming blanket to be applied.  Patient appears to be at her baseline mental status per discussion with nursing facility. [TL]   1924 JED and hypomagnesemia noted.  Mild hyperphosphatemia in the setting of mild to Mollison detected.  Will replete magnesium and patient are to be given IV fluid bolus and Montano catheter for strict I's and O/temperature monitoring. [TL]   2016 ICU physician is not seeing indication for ICU admission. Recommends [facility] admission. Call placed to IM service.  [TL]   2027 Hydrocortisone to be given at this time in case patient is suffering from any form of adrenal crisis.  Free cortisol level pending. [TL]   2037 Patient accepted to ICU stepdown by the internal medicine team.  This confirmed with the nursing supervisor that external warming/warming blanket can be performed from a nursing perspective on the stepdown unit. [TL]      ED Course User Index  [TL] Idris Kline DO         Diagnoses as of 10/30/24 1449   Hypothermia, initial encounter   Adrenal crisis (Multi)   Severe protein-calorie malnutrition (Multi)                 No data recorded                                 Medical Decision Making  Patient presented without found member at bedside after several days of unwillingness to take p.o. at her nursing facility.  Per report and after discussion with the nurse at the facility there is been an ongoing discussion between the primary physician and the patient's family as well as the patient about enrolling with palliative or hospice.  They have attempted multiple Corpak insertions however the patient recurrently pulls them out stating that she do not want them.  Based on this she was sent to the emergency department reportedly with no other concerns from a physical perspective other than for admission for PEG tube consideration.  Nursing facility not aware of the patient's need for steroids when questioned nor were they  aware of the patient becoming hypothermic when asked.  Patient was rewarmed with active external warming via Allan hugger/warming blanket.  This did improve her core temperature.  Patient meeting criteria for mild hypothermia.  Family not at bedside to confirm this but call was placed to nursing facility for further investigation.  Patient arrived hypothermic with a history of adrenal crisis in the past.  Given that she has not been taking p.o. there was concern that she was in adrenal crisis.  Sodium and potassium levels were not significantly altered however.  Free cortisol level ordered and pending.  Hydrocortisone 100 mg given.  Septic workup initially initiated as well.  Temp sensing Montano placed.  Urinalysis pending at time of admission to the internal medicine team.  Lactate, blood cultures, troponin and EKG obtained.  Broad-spectrum antibiotics initiated with no obvious source of infection on initial arrival based on history and exam.  No sign of acute cardiac ischemia patient altered but is mentating and answering questions appropriately.  Per nursing facility bedside nurse this is her baseline.  Small buttock/sacral wound noted but does not appear to be acutely infected.  Case discussed with the critical care team who does not feel the patient requires ICU level care.  Case was then discussed with both the internal medicine team and the nursing supervisor who feel the patient is appropriate for the ICU stepdown unit.  Based on this bed request was initiated.  Patient house convenience to the ICU at discretion of internal medicine team and nursing supervisor.  Urinalysis pending at time of signout to internal medicine team.  They agree to follow-up on this result.        Procedure  Critical Care    Performed by: Idris Kline DO  Authorized by: Idris Kline DO    Critical care provider statement:     Critical care time (minutes):  35    Critical care time was exclusive of:  Separately billable  procedures and treating other patients and teaching time    Critical care was necessary to treat or prevent imminent or life-threatening deterioration of the following conditions:  Endocrine crisis    Critical care was time spent personally by me on the following activities:  Ordering and performing treatments and interventions, ordering and review of laboratory studies, ordering and review of radiographic studies, pulse oximetry, re-evaluation of patient's condition, review of old charts, obtaining history from patient or surrogate, evaluation of patient's response to treatment and development of treatment plan with patient or surrogate       Idris Kline DO  10/30/24 [id number]

## 2024-10-30 LAB
ALBUMIN SERPL BCP-MCNC: 3 G/DL (ref 3.4–5)
ALP SERPL-CCNC: 133 U/L (ref 33–136)
ALT SERPL W P-5'-P-CCNC: 23 U/L (ref 7–45)
ANION GAP SERPL CALC-SCNC: 15 MMOL/L (ref 10–20)
AST SERPL W P-5'-P-CCNC: 29 U/L (ref 9–39)
BILIRUB SERPL-MCNC: 0.7 MG/DL (ref 0–1.2)
BUN SERPL-MCNC: 21 MG/DL (ref 6–23)
CALCIUM SERPL-MCNC: 8.7 MG/DL (ref 8.6–10.3)
CHLORIDE SERPL-SCNC: 111 MMOL/L (ref 98–107)
CO2 SERPL-SCNC: 22 MMOL/L (ref 21–32)
CREAT SERPL-MCNC: 1.71 MG/DL (ref 0.5–1.05)
EGFRCR SERPLBLD CKD-EPI 2021: 34 ML/MIN/1.73M*2
ERYTHROCYTE [DISTWIDTH] IN BLOOD BY AUTOMATED COUNT: 22.3 % (ref 11.5–14.5)
GLUCOSE BLD MANUAL STRIP-MCNC: 100 MG/DL (ref 74–99)
GLUCOSE BLD MANUAL STRIP-MCNC: 101 MG/DL (ref 74–99)
GLUCOSE BLD MANUAL STRIP-MCNC: 102 MG/DL (ref 74–99)
GLUCOSE BLD MANUAL STRIP-MCNC: 114 MG/DL (ref 74–99)
GLUCOSE BLD MANUAL STRIP-MCNC: 131 MG/DL (ref 74–99)
GLUCOSE BLD MANUAL STRIP-MCNC: 140 MG/DL (ref 74–99)
GLUCOSE SERPL-MCNC: 123 MG/DL (ref 74–99)
HCT VFR BLD AUTO: 31.7 % (ref 36–46)
HGB BLD-MCNC: 9.2 G/DL (ref 12–16)
HOLD SPECIMEN: NORMAL
MCH RBC QN AUTO: 28.6 PG (ref 26–34)
MCHC RBC AUTO-ENTMCNC: 29 G/DL (ref 32–36)
MCV RBC AUTO: 98 FL (ref 80–100)
NRBC BLD-RTO: 0.6 /100 WBCS (ref 0–0)
PLATELET # BLD AUTO: 74 X10*3/UL (ref 150–450)
POTASSIUM SERPL-SCNC: 4.1 MMOL/L (ref 3.5–5.3)
PROT SERPL-MCNC: 6.2 G/DL (ref 6.4–8.2)
RBC # BLD AUTO: 3.22 X10*6/UL (ref 4–5.2)
SODIUM SERPL-SCNC: 144 MMOL/L (ref 136–145)
WBC # BLD AUTO: 8.8 X10*3/UL (ref 4.4–11.3)

## 2024-10-30 PROCEDURE — 92610 EVALUATE SWALLOWING FUNCTION: CPT | Mod: GN | Performed by: SPEECH-LANGUAGE PATHOLOGIST

## 2024-10-30 PROCEDURE — 82947 ASSAY GLUCOSE BLOOD QUANT: CPT

## 2024-10-30 PROCEDURE — 85027 COMPLETE CBC AUTOMATED: CPT | Performed by: INTERNAL MEDICINE

## 2024-10-30 PROCEDURE — 2500000004 HC RX 250 GENERAL PHARMACY W/ HCPCS (ALT 636 FOR OP/ED): Performed by: INTERNAL MEDICINE

## 2024-10-30 PROCEDURE — 94640 AIRWAY INHALATION TREATMENT: CPT

## 2024-10-30 PROCEDURE — 99233 SBSQ HOSP IP/OBS HIGH 50: CPT | Performed by: INTERNAL MEDICINE

## 2024-10-30 PROCEDURE — 36415 COLL VENOUS BLD VENIPUNCTURE: CPT | Performed by: INTERNAL MEDICINE

## 2024-10-30 PROCEDURE — 2060000001 HC INTERMEDIATE ICU ROOM DAILY

## 2024-10-30 PROCEDURE — 2500000002 HC RX 250 W HCPCS SELF ADMINISTERED DRUGS (ALT 637 FOR MEDICARE OP, ALT 636 FOR OP/ED): Performed by: INTERNAL MEDICINE

## 2024-10-30 PROCEDURE — 80053 COMPREHEN METABOLIC PANEL: CPT | Performed by: INTERNAL MEDICINE

## 2024-10-30 PROCEDURE — 99221 1ST HOSP IP/OBS SF/LOW 40: CPT | Performed by: NURSE PRACTITIONER

## 2024-10-30 RX ORDER — ERTAPENEM 1 G/1
1 INJECTION, POWDER, LYOPHILIZED, FOR SOLUTION INTRAMUSCULAR; INTRAVENOUS EVERY 24 HOURS
Status: DISCONTINUED | OUTPATIENT
Start: 2024-10-30 | End: 2024-11-01

## 2024-10-30 RX ORDER — LEVETIRACETAM 5 MG/ML
500 INJECTION INTRAVASCULAR EVERY 12 HOURS
Status: DISCONTINUED | OUTPATIENT
Start: 2024-10-30 | End: 2024-11-01

## 2024-10-30 SDOH — ECONOMIC STABILITY: FOOD INSECURITY: WITHIN THE PAST 12 MONTHS, YOU WORRIED THAT YOUR FOOD WOULD RUN OUT BEFORE YOU GOT THE MONEY TO BUY MORE.: NEVER TRUE

## 2024-10-30 SDOH — ECONOMIC STABILITY: HOUSING INSECURITY: IN THE LAST 12 MONTHS, WAS THERE A TIME WHEN YOU WERE NOT ABLE TO PAY THE MORTGAGE OR RENT ON TIME?: NO

## 2024-10-30 SDOH — SOCIAL STABILITY: SOCIAL INSECURITY: WITHIN THE LAST YEAR, HAVE YOU BEEN HUMILIATED OR EMOTIONALLY ABUSED IN OTHER WAYS BY YOUR PARTNER OR EX-PARTNER?: NO

## 2024-10-30 SDOH — SOCIAL STABILITY: SOCIAL INSECURITY: DO YOU FEEL UNSAFE GOING BACK TO THE PLACE WHERE YOU ARE LIVING?: NO

## 2024-10-30 SDOH — ECONOMIC STABILITY: INCOME INSECURITY: IN THE PAST 12 MONTHS HAS THE ELECTRIC, GAS, OIL, OR WATER COMPANY THREATENED TO SHUT OFF SERVICES IN YOUR HOME?: NO

## 2024-10-30 SDOH — SOCIAL STABILITY: SOCIAL INSECURITY: HAVE YOU HAD THOUGHTS OF HARMING ANYONE ELSE?: NO

## 2024-10-30 SDOH — SOCIAL STABILITY: SOCIAL INSECURITY: WITHIN THE LAST YEAR, HAVE YOU BEEN AFRAID OF YOUR PARTNER OR EX-PARTNER?: NO

## 2024-10-30 SDOH — SOCIAL STABILITY: SOCIAL INSECURITY: DO YOU FEEL ANYONE HAS EXPLOITED OR TAKEN ADVANTAGE OF YOU FINANCIALLY OR OF YOUR PERSONAL PROPERTY?: NO

## 2024-10-30 SDOH — ECONOMIC STABILITY: HOUSING INSECURITY: IN THE PAST 12 MONTHS, HOW MANY TIMES HAVE YOU MOVED WHERE YOU WERE LIVING?: 1

## 2024-10-30 SDOH — ECONOMIC STABILITY: TRANSPORTATION INSECURITY: IN THE PAST 12 MONTHS, HAS LACK OF TRANSPORTATION KEPT YOU FROM MEDICAL APPOINTMENTS OR FROM GETTING MEDICATIONS?: NO

## 2024-10-30 SDOH — SOCIAL STABILITY: SOCIAL INSECURITY: HAS ANYONE EVER THREATENED TO HURT YOUR FAMILY OR YOUR PETS?: NO

## 2024-10-30 SDOH — SOCIAL STABILITY: SOCIAL INSECURITY
WITHIN THE LAST YEAR, HAVE YOU BEEN RAPED OR FORCED TO HAVE ANY KIND OF SEXUAL ACTIVITY BY YOUR PARTNER OR EX-PARTNER?: NO

## 2024-10-30 SDOH — ECONOMIC STABILITY: FOOD INSECURITY: HOW HARD IS IT FOR YOU TO PAY FOR THE VERY BASICS LIKE FOOD, HOUSING, MEDICAL CARE, AND HEATING?: NOT HARD AT ALL

## 2024-10-30 SDOH — SOCIAL STABILITY: SOCIAL INSECURITY: HAVE YOU HAD ANY THOUGHTS OF HARMING ANYONE ELSE?: NO

## 2024-10-30 SDOH — ECONOMIC STABILITY: HOUSING INSECURITY: AT ANY TIME IN THE PAST 12 MONTHS, WERE YOU HOMELESS OR LIVING IN A SHELTER (INCLUDING NOW)?: NO

## 2024-10-30 SDOH — SOCIAL STABILITY: SOCIAL INSECURITY: DOES ANYONE TRY TO KEEP YOU FROM HAVING/CONTACTING OTHER FRIENDS OR DOING THINGS OUTSIDE YOUR HOME?: NO

## 2024-10-30 SDOH — ECONOMIC STABILITY: FOOD INSECURITY: WITHIN THE PAST 12 MONTHS, THE FOOD YOU BOUGHT JUST DIDN'T LAST AND YOU DIDN'T HAVE MONEY TO GET MORE.: NEVER TRUE

## 2024-10-30 SDOH — SOCIAL STABILITY: SOCIAL INSECURITY: ARE THERE ANY APPARENT SIGNS OF INJURIES/BEHAVIORS THAT COULD BE RELATED TO ABUSE/NEGLECT?: NO

## 2024-10-30 SDOH — SOCIAL STABILITY: SOCIAL INSECURITY: WERE YOU ABLE TO COMPLETE ALL THE BEHAVIORAL HEALTH SCREENINGS?: YES

## 2024-10-30 SDOH — SOCIAL STABILITY: SOCIAL INSECURITY: ABUSE: ADULT

## 2024-10-30 SDOH — SOCIAL STABILITY: SOCIAL INSECURITY: ARE YOU OR HAVE YOU BEEN THREATENED OR ABUSED PHYSICALLY, EMOTIONALLY, OR SEXUALLY BY ANYONE?: NO

## 2024-10-30 ASSESSMENT — PAIN SCALES - GENERAL
PAINLEVEL_OUTOF10: 0 - NO PAIN

## 2024-10-30 ASSESSMENT — COGNITIVE AND FUNCTIONAL STATUS - GENERAL
PATIENT BASELINE BEDBOUND: YES
CLIMB 3 TO 5 STEPS WITH RAILING: TOTAL
DAILY ACTIVITIY SCORE: 6
DRESSING REGULAR UPPER BODY CLOTHING: TOTAL
MOVING TO AND FROM BED TO CHAIR: TOTAL
TURNING FROM BACK TO SIDE WHILE IN FLAT BAD: TOTAL
STANDING UP FROM CHAIR USING ARMS: TOTAL
HELP NEEDED FOR BATHING: TOTAL
WALKING IN HOSPITAL ROOM: TOTAL
EATING MEALS: TOTAL
MOVING TO AND FROM BED TO CHAIR: TOTAL
MOVING FROM LYING ON BACK TO SITTING ON SIDE OF FLAT BED WITH BEDRAILS: TOTAL
DRESSING REGULAR UPPER BODY CLOTHING: TOTAL
CLIMB 3 TO 5 STEPS WITH RAILING: TOTAL
TURNING FROM BACK TO SIDE WHILE IN FLAT BAD: TOTAL
MOBILITY SCORE: 6
DRESSING REGULAR LOWER BODY CLOTHING: TOTAL
MOVING FROM LYING ON BACK TO SITTING ON SIDE OF FLAT BED WITH BEDRAILS: TOTAL
MOBILITY SCORE: 6
WALKING IN HOSPITAL ROOM: TOTAL
STANDING UP FROM CHAIR USING ARMS: TOTAL
DAILY ACTIVITIY SCORE: 6
HELP NEEDED FOR BATHING: TOTAL
TOILETING: TOTAL
PERSONAL GROOMING: TOTAL
PERSONAL GROOMING: TOTAL
DRESSING REGULAR LOWER BODY CLOTHING: TOTAL
TOILETING: TOTAL
EATING MEALS: TOTAL

## 2024-10-30 ASSESSMENT — PAIN - FUNCTIONAL ASSESSMENT
PAIN_FUNCTIONAL_ASSESSMENT: 0-10

## 2024-10-30 ASSESSMENT — ACTIVITIES OF DAILY LIVING (ADL)
HEARING - RIGHT EAR: FUNCTIONAL
DRESSING YOURSELF: DEPENDENT
LACK_OF_TRANSPORTATION: NO
ADEQUATE_TO_COMPLETE_ADL: UNABLE TO ASSESS
FEEDING YOURSELF: DEPENDENT
HEARING - LEFT EAR: FUNCTIONAL
PATIENT'S MEMORY ADEQUATE TO SAFELY COMPLETE DAILY ACTIVITIES?: UNABLE TO ASSESS
GROOMING: DEPENDENT
JUDGMENT_ADEQUATE_SAFELY_COMPLETE_DAILY_ACTIVITIES: UNABLE TO ASSESS
BATHING: DEPENDENT
TOILETING: DEPENDENT
WALKS IN HOME: DEPENDENT
LACK_OF_TRANSPORTATION: NO

## 2024-10-30 ASSESSMENT — LIFESTYLE VARIABLES
HOW MANY STANDARD DRINKS CONTAINING ALCOHOL DO YOU HAVE ON A TYPICAL DAY: PATIENT DOES NOT DRINK
HOW OFTEN DO YOU HAVE A DRINK CONTAINING ALCOHOL: NEVER
HOW OFTEN DO YOU HAVE 6 OR MORE DRINKS ON ONE OCCASION: NEVER
AUDIT-C TOTAL SCORE: 0
AUDIT-C TOTAL SCORE: 0
SKIP TO QUESTIONS 9-10: 1

## 2024-10-30 NOTE — ASSESSMENT & PLAN NOTE
Patient with a well established and documented history of adrenal insufficiency.  Noted to be on Solu-Cortef both in the morning and in the evening.  It is unclear if she has been reliably receiving these medications due to her poor p.o. intake and removal of Corpak.  Findings at the time of admission including hypotension, hypothermia consistent with adrenal crisis.  To be started on stress dose Solu-Cortef.  100 mg bolus has been ordered by ED physicians and should be given immediately.  Following this bolus dose, will continue 50 mg every 6 hours before transitioning back to her oral agents which include 20 mg every morning and 10 mg every afternoon

## 2024-10-30 NOTE — CONSULTS
Reason For Consult  PEG    This note was created using voice recognition transcription software. Despite proofreading, unintentional typographical errors may be present. Please contact the GI office with any questions or concerns.       This is a 64 yo Female with a PMH of COPD, Pafib on Eliquis, pacemaker, recurrent C diff, encephalopathy, CAD s/p CABG (2013), diverticulitis, hematochezia, CKD3, CVA, thrombocytopenia, seizures, GERD, adrenal insufficiency, lupus who presented to [facility] on 10/29/24 with reports of poor PO intake.  GI was consulted for PEG placement.  She lives in a nursing home and has been there for the last year.  Before that, she was living with her son for 2 years.  Her son López helps provide the information in this note. He is trying to be an advocate for his mother and go through with her wishes as of Saturday which was a PEG tube. He states she rapidly declined within the last few years and it still continues.  She's  and she has 2 children.  She was most recently seen by GI service 07/2024 for evaluation of PEG tube placement and it was felt she is too high risk at that time.        Subjective  Has had a corpak in place for the last 30 days.  Reportedly removing PEG repeatedly.      He states she's typically calm, relaxed and compliant.  He states that she has been having episodes of lupus psychosis, and hallucinating.    She's had C diff a few times and was treated.      Weight is stable.  He states that she has poor PO intake and no swallowing issues.          EGD-Son believes that she has at one point.    Colonoscopy-11/13/18 showing polyps, hemorrhoids, diverticulosis  BM-Daily. liquid consistency and prior issues with diarrhea.  No bleeding or weight loss.  FHX-Denies   SHX-No tobacco/illicits/ETOH.  Former tobacco use.  Quit 30 + years ago.    Ab Sx-C section  NSAIDs-Denies         Allergies: as mentioned in H&P      A 10 point review of system is negative except for what is  mentioned in the HPI    Vital Signs: Reviewed    Physical Exam:  General: Calm, frail appearing  Skin:  Warm and dry, no jaundice  HEENT: No scleral icterus, no conjunctival pallor, normocephalic, atraumatic, mucous membranes moist  Neck:  atraumatic, trachea midline, no JVD  Chest:  Clear to auscultation bilaterally. No wheezes, rales, or rhonchi  CV:  Regular rate and rhythm.  Positive S1/S2  Abdomen: no distension, +BS, soft, non-tender to palpation, no rebound tenderness, no guarding, no rigidity, no discernible ascites   Extremities: Bilateral lower extremity edema, chronic pigmentary changes, no cyanosis  Neurological:  Not conversant  Psychiatric: cooperative     Investigations:  Labs, radiological imaging and cardiac work up were reviewed      Objective:         10/30/2024     4:00 AM 10/30/2024     5:00 AM 10/30/2024     5:39 AM 10/30/2024     6:00 AM 10/30/2024     7:00 AM 10/30/2024    12:02 PM 10/30/2024    12:35 PM   Vitals   Systolic 102   103  83 97   Diastolic 64   70  62 59   Heart Rate 86 86  86 80 90 90   Temp 34.8 °C (94.6 °F) 34.8 °C (94.6 °F)  34.8 °C (94.6 °F) 35 °C (95 °F) 35.8 °C (96.4 °F) 36 °C (96.8 °F)   Resp 23 28  21 18 21 22   Weight (lb)   175.49       BMI   30.12 kg/m2       BSA (m2)   1.9 m2              Medications:  atorvastatin, 40 mg, oral, Nightly  budesonide, 0.5 mg, nebulization, BID  ertapenem, 1 g, intravenous, q24h  heparin (porcine), 5,000 Units, subcutaneous, q8h  [Held by provider] hydrocortisone, 10 mg, oral, q PM  [Held by provider] hydrocortisone, 20 mg, oral, q AM  hydrocortisone sodium succinate, 50 mg, intravenous, q6h  levETIRAcetam, 500 mg, intravenous, q12h  melatonin, 5 mg, oral, Nightly  [Held by provider] metoprolol tartrate, 25 mg, oral, BID  mycophenolate, 1,000 mg, oral, BID  pantoprazole, 40 mg, oral, Daily before breakfast   Or  pantoprazole, 40 mg, intravenous, Daily before breakfast  sertraline, 25 mg, oral, Daily         Recent Results (from the past  72 hours)   Sars-CoV-2 RT PCR, Symptomatic    Collection Time: 10/29/24  6:32 PM   Result Value Ref Range    Coronavirus 2019, PCR Not Detected Not Detected   MRSA Surveillance for Vancomycin De-escalation, PCR    Collection Time: 10/29/24  6:32 PM    Specimen: Anterior Nares; Swab   Result Value Ref Range    MRSA PCR Not Detected Not Detected   CBC and Auto Differential    Collection Time: 10/29/24  6:45 PM   Result Value Ref Range    WBC 5.7 4.4 - 11.3 x10*3/uL    nRBC 1.0 (H) 0.0 - 0.0 /100 WBCs    RBC 3.21 (L) 4.00 - 5.20 x10*6/uL    Hemoglobin 9.3 (L) 12.0 - 16.0 g/dL    Hematocrit 32.1 (L) 36.0 - 46.0 %     80 - 100 fL    MCH 29.0 26.0 - 34.0 pg    MCHC 29.0 (L) 32.0 - 36.0 g/dL    RDW 22.6 (H) 11.5 - 14.5 %    Platelets 73 (L) 150 - 450 x10*3/uL    Neutrophils % 68.3 40.0 - 80.0 %    Immature Granulocytes %, Automated 3.1 (H) 0.0 - 0.9 %    Lymphocytes % 21.6 13.0 - 44.0 %    Monocytes % 6.4 2.0 - 10.0 %    Eosinophils % 0.3 0.0 - 6.0 %    Basophils % 0.3 0.0 - 2.0 %    Neutrophils Absolute 3.91 1.20 - 7.70 x10*3/uL    Immature Granulocytes Absolute, Automated 0.18 0.00 - 0.70 x10*3/uL    Lymphocytes Absolute 1.24 1.20 - 4.80 x10*3/uL    Monocytes Absolute 0.37 0.10 - 1.00 x10*3/uL    Eosinophils Absolute 0.02 0.00 - 0.70 x10*3/uL    Basophils Absolute 0.02 0.00 - 0.10 x10*3/uL   Comprehensive Metabolic Panel    Collection Time: 10/29/24  6:45 PM   Result Value Ref Range    Glucose 116 (H) 74 - 99 mg/dL    Sodium 146 (H) 136 - 145 mmol/L    Potassium 4.5 3.5 - 5.3 mmol/L    Chloride 112 (H) 98 - 107 mmol/L    Bicarbonate 22 21 - 32 mmol/L    Anion Gap 17 10 - 20 mmol/L    Urea Nitrogen 24 (H) 6 - 23 mg/dL    Creatinine 1.81 (H) 0.50 - 1.05 mg/dL    eGFR 31 (L) >60 mL/min/1.73m*2    Calcium 8.9 8.6 - 10.3 mg/dL    Albumin 3.1 (L) 3.4 - 5.0 g/dL    Alkaline Phosphatase 127 33 - 136 U/L    Total Protein 6.7 6.4 - 8.2 g/dL    AST 45 (H) 9 - 39 U/L    Bilirubin, Total 0.6 0.0 - 1.2 mg/dL    ALT 26 7 - 45  U/L   Magnesium    Collection Time: 10/29/24  6:45 PM   Result Value Ref Range    Magnesium 1.59 (L) 1.60 - 2.40 mg/dL   Phosphorus    Collection Time: 10/29/24  6:45 PM   Result Value Ref Range    Phosphorus 5.8 (H) 2.5 - 4.9 mg/dL   TSH with reflex to Free T4 if abnormal    Collection Time: 10/29/24  6:45 PM   Result Value Ref Range    Thyroid Stimulating Hormone 4.00 (H) 0.44 - 3.98 mIU/L   Troponin I, High Sensitivity, Initial    Collection Time: 10/29/24  6:45 PM   Result Value Ref Range    Troponin I, High Sensitivity 12 0 - 13 ng/L   Lactate    Collection Time: 10/29/24  6:45 PM   Result Value Ref Range    Lactate 1.0 0.4 - 2.0 mmol/L   Blood Culture    Collection Time: 10/29/24  6:45 PM    Specimen: Peripheral Venipuncture; Blood culture   Result Value Ref Range    Blood Culture Loaded on Instrument - Culture in progress    Light Blue Top    Collection Time: 10/29/24  6:45 PM   Result Value Ref Range    Extra Tube Hold for add-ons.    Thyroxine, Free    Collection Time: 10/29/24  6:45 PM   Result Value Ref Range    Thyroxine, Free 1.11 0.61 - 1.12 ng/dL   Morphology    Collection Time: 10/29/24  6:45 PM   Result Value Ref Range    RBC Morphology See Below     Polychromasia Mild     Hypochromia Mild     RBC Fragments Few     Ovalocytes Few     Katarzyna Cells Few    Blood Culture    Collection Time: 10/29/24  6:48 PM    Specimen: Peripheral Venipuncture; Blood culture   Result Value Ref Range    Blood Culture Loaded on Instrument - Culture in progress    Urinalysis with Reflex Culture and Microscopic    Collection Time: 10/29/24  8:34 PM   Result Value Ref Range    Color, Urine Light-Orange (N) Light-Yellow, Yellow, Dark-Yellow    Appearance, Urine Ex.Turbid (N) Clear    Specific Gravity, Urine 1.013 1.005 - 1.035    pH, Urine 6.0 5.0, 5.5, 6.0, 6.5, 7.0, 7.5, 8.0    Protein, Urine 70 (1+) (A) NEGATIVE, 10 (TRACE), 20 (TRACE) mg/dL    Glucose, Urine Normal Normal mg/dL    Blood, Urine 0.1 (1+) (A) NEGATIVE     Ketones, Urine NEGATIVE NEGATIVE mg/dL    Bilirubin, Urine NEGATIVE NEGATIVE    Urobilinogen, Urine Normal Normal mg/dL    Nitrite, Urine 2+ (A) NEGATIVE    Leukocyte Esterase, Urine 500 Margarita/µL (A) NEGATIVE   Extra Urine Gray Tube    Collection Time: 10/29/24  8:34 PM   Result Value Ref Range    Extra Tube Hold for add-ons.    Microscopic Only, Urine    Collection Time: 10/29/24  8:34 PM   Result Value Ref Range    WBC, Urine >50 (A) 1-5, NONE /HPF    RBC, Urine 11-20 (A) NONE, 1-2, 3-5 /HPF    Bacteria, Urine 1+ (A) NONE SEEN /HPF    Mucus, Urine 1+ Reference range not established. /LPF    Hyaline Casts, Urine 2+ (A) NONE /LPF    Fine Granular Casts, Urine 3+ (A) NONE /LPF   Troponin, High Sensitivity, 1 Hour    Collection Time: 10/29/24 10:04 PM   Result Value Ref Range    Troponin I, High Sensitivity 12 0 - 13 ng/L   POCT GLUCOSE    Collection Time: 10/29/24 11:46 PM   Result Value Ref Range    POCT Glucose 89 74 - 99 mg/dL   CBC    Collection Time: 10/30/24  4:08 AM   Result Value Ref Range    WBC 8.8 4.4 - 11.3 x10*3/uL    nRBC 0.6 (H) 0.0 - 0.0 /100 WBCs    RBC 3.22 (L) 4.00 - 5.20 x10*6/uL    Hemoglobin 9.2 (L) 12.0 - 16.0 g/dL    Hematocrit 31.7 (L) 36.0 - 46.0 %    MCV 98 80 - 100 fL    MCH 28.6 26.0 - 34.0 pg    MCHC 29.0 (L) 32.0 - 36.0 g/dL    RDW 22.3 (H) 11.5 - 14.5 %    Platelets 74 (L) 150 - 450 x10*3/uL   Comprehensive metabolic panel    Collection Time: 10/30/24  4:08 AM   Result Value Ref Range    Glucose 123 (H) 74 - 99 mg/dL    Sodium 144 136 - 145 mmol/L    Potassium 4.1 3.5 - 5.3 mmol/L    Chloride 111 (H) 98 - 107 mmol/L    Bicarbonate 22 21 - 32 mmol/L    Anion Gap 15 10 - 20 mmol/L    Urea Nitrogen 21 6 - 23 mg/dL    Creatinine 1.71 (H) 0.50 - 1.05 mg/dL    eGFR 34 (L) >60 mL/min/1.73m*2    Calcium 8.7 8.6 - 10.3 mg/dL    Albumin 3.0 (L) 3.4 - 5.0 g/dL    Alkaline Phosphatase 133 33 - 136 U/L    Total Protein 6.2 (L) 6.4 - 8.2 g/dL    AST 29 9 - 39 U/L    Bilirubin, Total 0.7 0.0 - 1.2  mg/dL    ALT 23 7 - 45 U/L   POCT GLUCOSE    Collection Time: 10/30/24  4:08 AM   Result Value Ref Range    POCT Glucose 114 (H) 74 - 99 mg/dL   POCT GLUCOSE    Collection Time: 10/30/24  8:16 AM   Result Value Ref Range    POCT Glucose 131 (H) 74 - 99 mg/dL   POCT GLUCOSE    Collection Time: 10/30/24 11:44 AM   Result Value Ref Range    POCT Glucose 140 (H) 74 - 99 mg/dL          Assessment:  This is a 62 yo Female with a PMH of COPD, Pafib on Eliquis, pacemaker, recurrent C diff, encephalopathy, CAD s/p CABG (2013), diverticulitis, hematochezia, CKD3, CVA, thrombocytopenia, seizures, GERD, adrenal insufficiency, lupus who presented to [facility] on 10/29/24 with reports of inability to tolerate po intake.  GI was consulted for PEG placement. Per son, patient has rapidly declined over the last few years.  No weight loss but is not able to take in sufficient PO for unknown reasons.  Reports of her pulling Corpaks out were related to lupus psychosis per her son and typically she's pretty calm and compliant.  Per the son, he and his sister wish to proceed with a PEG tube per his mother's wishes.          Plan  1.)  PEG-Patient currently on warming blanket for significant hypothermia, concern for adrenal crisis. High risk for PEG placement and removal given she has pulled Corpaks.  Recommend discussing goals of care with family given her guarded prognosis.           Discussed patient with Dr. De Leon.  I spent much time speaking with the patient's son about goals of care etc.    I spent 75 minutes in the professional and overall care of this patient.      10/30/24 at 1:15 PM - JIM Levy-CNP

## 2024-10-30 NOTE — ASSESSMENT & PLAN NOTE
As described above, recent urine culture noting greater than 100,000 CFU of Klebsiella pneumonia/vera cola.  ESBL producing organism noted.  Ertapenem to be continued at this time with ID consultation.

## 2024-10-30 NOTE — ASSESSMENT & PLAN NOTE
"Lowest core temperature noted at the time of admission 90.7.  This places her in the mild hypothermia category.  Patient has been started on active external rewarming with \"bear hugger\" application.  Maintain a warm environment, ensure that clothing/dressings are not saturated, and we will maintain bear hugger at this time.  Anticipate that with correction of adrenal insufficiency, will see additional improvement in her core temperature.  Maintain every 2 hour vitals while undergoing active rewarming.  "

## 2024-10-30 NOTE — ASSESSMENT & PLAN NOTE
Every 4 hour insulin sliding scale coverage.  However we will defer initiation of insulin until resolution of the acute adrenal crisis given the propensity for sustained hypoglycemic episodes during acute crisis.  Therefore every 4 hour monitoring without coverage to begin this admission and once glucose trends are established, will initiate coverage at that time as indicated

## 2024-10-30 NOTE — ASSESSMENT & PLAN NOTE
Likely secondary to adrenal insufficiency.  However cannot exclude underlying infectious etiology given her recent diagnosis of multidrug-resistant Klebsiella and UA at the time of admission being grossly abnormal.  Patient was given vancomycin and Zosyn.  With the suspected infectious source being  in origin, will change to ertapenem based on previous culture data.  ID service to be consulted given the scenario for antibiotic recommendations and management.  Of note, during previous ertapenem administration there was close monitoring for side effects including rash, diarrhea, thrombocytopenia, JED.  Platelet count at that time noted to be 79.  Creatinine noted to be 1.69.  Today, platelets are noted to be 73 and creatinine is noted to be 1.81.  Will require continued cautious monitoring for these potential side effects.  Ultimately defer to recommendations of ID service for antibiotic selection.

## 2024-10-30 NOTE — CONSULTS
Infectious Disease Consult    PATIENT NAME: Bing Hloliday    MRN: [id number]  SERVICE DATE:  10/30/2024   SERVICE TIME:  11:42 AM    SIGNATURE: Jean Cason MD    PRIMARY CARE PHYSICIAN: Claudio Hood DO  REASON FOR CONSULT: UTI   REQUESTING PHYSICIAN: Dr.Shah HAND   63-year-old female with past medical history as listed below, she is well-known to me for recurrent UTI she was admitted with decreased p.o. intake, hypothermia.  She has a history of positive Klebsiella ESBL in the urine culture.  This admission she has pyuria and bacteriuria, no leukocytosis.  Started on ertapenem.    PAST MEDICAL HISTORY:   Past Medical History:   Diagnosis Date    Abnormal kidney function 04/04/2023    Acute headache 03/10/2024    Acute iritis of right eye 07/24/2015    Acute low back pain 10/30/2023    Acute upper respiratory infection, unspecified 03/04/2020    Acute URI    Acute upper respiratory infection, unspecified 09/30/2015    URTI (acute upper respiratory infection)    Arthralgia of right knee 02/14/2024    Arthritis     Atypical facial pain 03/10/2024    Body mass index (BMI) 23.0-23.9, adult 10/15/2021    BMI 23.0-23.9, adult    Body mass index (BMI) 33.0-33.9, adult 03/04/2020    BMI 33.0-33.9,adult    Cardiomegaly 08/27/2013    Left ventricular hypertrophy    Chest pain 06/10/2022    Comment on above: Added by Problem List Migration; 2013-3-12; Moved to Suppressed Nov 25 2013 9:16PM; Comment on above: Added by Problem List Migration; 2013-3-12; Moved to Suppressed Nov 25 2013 9:16PM;    Chronic kidney disease, stage 3 unspecified (Multi) 07/02/2013    Chronic kidney disease, stage III (moderate)    Confusional state 03/10/2024    Contact with and (suspected) exposure to covid-19 04/04/2023    Delirium 03/10/2024    Disease of pericardium, unspecified 07/02/2013    Pericardial disease    Encounter for follow-up examination after completed treatment for conditions other than malignant neoplasm 10/06/2022     Hospital discharge follow-up    Generalized contraction of visual field, right eye 01/29/2015    Generalized contraction of visual field of right eye    Hearing loss 03/10/2024    History of cataract 03/10/2024    History of thrombocytopenia 03/10/2024    Comment on above: Added by Problem List Migration; 2013-7-2;    Homonymous bilateral field defects, right side 04/29/2016    Homonymous bilateral field defects of right side    Hypertensive chronic kidney disease with stage 1 through stage 4 chronic kidney disease, or unspecified chronic kidney disease 07/02/2013    Nephrosclerosis    Laceration without foreign body, left foot, initial encounter 07/03/2018    Foot laceration, left, initial encounter    Localized edema 03/10/2024    Localized, primary osteoarthritis 02/14/2024    Mass of skin 03/10/2024    Migraine with aura, not intractable, without status migrainosus 10/24/2022    Ocular migraine    Open wound 03/10/2024    Open wound of left foot 03/10/2024    Other conditions influencing health status 07/02/2013    Chronic Glomerulonephritis In Diseases Classified Elsewhere    Other conditions influencing health status 07/02/2013    Progressive Familial Myoclonic Epilepsy    Other conditions influencing health status 07/02/2013    Protein S Deficiency    Other conditions influencing health status 05/22/2015    Familial Combined Hyperlipidemia    Other conditions influencing health status 10/24/2022    IDDM (insulin dependent diabetes mellitus)    Other conditions influencing health status 03/14/2022    Diabetes mellitus, insulin dependent (IDDM), uncontrolled    Other long term (current) drug therapy 10/24/2022    Long-term use of Plaquenil    Overweight with body mass index (BMI) 25.0-29.9 03/10/2024    Pain of toe 03/10/2024    Personal history of COVID-19 04/04/2023    Personal history of diseases of the blood and blood-forming organs and certain disorders involving the immune mechanism 07/02/2013    History  of thrombocytopenia    Personal history of diseases of the skin and subcutaneous tissue 08/11/2015    History of foot ulcer    Personal history of nephrotic syndrome 07/02/2013    History of nephrotic syndrome    Personal history of other diseases of the circulatory system 08/27/2013    History of sinus tachycardia    Personal history of other diseases of the nervous system and sense organs     History of cataract    Personal history of other diseases of the respiratory system     History of bronchitis    Personal history of other infectious and parasitic diseases 07/02/2013    History of hepatitis    Personal history of other specified conditions     History of shortness of breath    Personal history of other specified conditions 08/27/2013    History of edema    Posterior epistaxis 03/10/2024    Puckering of macula, right eye 10/24/2022    ERM OD (epiretinal membrane, right eye)    Raynaud's syndrome without gangrene 07/02/2013    Raynaud's disease    Sinusitis 03/10/2024    Systemic lupus erythematosus, unspecified 07/24/2015    SLE (systemic lupus erythematosus)    Systemic lupus erythematosus, unspecified 07/24/2015    SLE (systemic lupus erythematosus)    Systemic lupus erythematosus, unspecified 07/24/2015    Systemic lupus    Trigeminal nerve disorder 10/04/2024    Type 2 diabetes mellitus with diabetic nephropathy 07/02/2013    Type 2 diabetes with nephropathy    Type 2 diabetes mellitus with mild nonproliferative diabetic retinopathy without macular edema, left eye 07/27/2015    Non-proliferative diabetic retinopathy, left eye    Type 2 diabetes mellitus with mild nonproliferative diabetic retinopathy without macular edema, unspecified eye 07/24/2015    Mild non proliferative diabetic retinopathy    Type 2 diabetes mellitus with proliferative diabetic retinopathy without macular edema, right eye 07/27/2015    Proliferative diabetic retinopathy of right eye    Type 2 diabetes mellitus with proliferative  diabetic retinopathy without macular edema, unspecified eye 07/24/2015    Diabetic proliferative retinopathy    Unspecified acute and subacute iridocyclitis 07/24/2015    Acute iritis, right eye    Unspecified open wound, left foot, sequela 07/03/2018    Wound, open, foot, left, sequela     PAST SURGICAL HISTORY:   Past Surgical History:   Procedure Laterality Date    ANKLE SURGERY  01/29/2015    Ankle Surgery    CARDIAC ELECTROPHYSIOLOGY PROCEDURE Left 5/17/2024    Procedure: PPM IMPLANT DUAL;  Surgeon: Antonio Rodriges MD;  Location: [facility];  Service: Electrophysiology;  Laterality: Left;  Pt. needs platelets and Prednisone prior to procedure    CHOLECYSTECTOMY  01/29/2015    Cholecystectomy    CT GUIDED PERCUTANEOUS BIOPSY BONE DEEP  5/4/2021    CT GUIDED PERCUTANEOUS BIOPSY BONE DEEP 5/4/2021 [facility]ICAL [facility]GAC[facility]    EYE SURGERY  03/06/2015    Eye Surgery    FOOT SURGERY  01/29/2015    Foot Surgery    MR HEAD ANGIO WO IV CONTRAST  7/26/2013    MR HEAD ANGIO WO IV CONTRAST 7/26/2013 [facility] CLINICAL LEGACY    MR HEAD ANGIO WO IV CONTRAST  9/17/2021    MR HEAD ANGIO WO IV CONTRAST 9/17/2021 [facility]    MR HEAD ANGIO WO IV CONTRAST  3/25/2023    MR HEAD ANGIO WO IV CONTRAST [facility]    MR NECK ANGIO WO IV CONTRAST  7/26/2013    MR NECK ANGIO WO IV CONTRAST 7/26/2013 [facility] CLINICAL LEGACY    MR NECK ANGIO WO IV CONTRAST  9/17/2021    MR NECK ANGIO WO IV CONTRAST 9/17/2021 [facility]    MR NECK ANGIO WO IV CONTRAST  3/25/2023    MR NECK ANGIO WO IV CONTRAST [facility]    OTHER SURGICAL HISTORY  01/29/2015    Creation Of Pericardial Window    OTHER SURGICAL HISTORY  01/29/2015    Quadricepsplasty    TOTAL HIP ARTHROPLASTY  01/29/2015    Hip Replacement     FAMILY HISTORY:   Family History   Problem Relation Name Age of Onset    Other (RENAL DISEASE) Mother          END STAGE    Other (CARDIAC DISORDER) Mother      Cataracts Mother      Stroke Mother      Diabetes Mother      Kidney disease  Mother      Lupus Mother      Other (CARDIAC DISORDER) Father      COPD Father      Glaucoma Father      Hypertension Father      Sleep apnea Father      Other (CARDIAC DISORDER) Sister      Depression Sister      Kidney disease Sister      Sickle cell trait Sister      Sleep apnea Daughter       SOCIAL HISTORY:   Social History     Tobacco Use    Smoking status: Never     Passive exposure: Never    Smokeless tobacco: Never   Vaping Use    Vaping status: Never Used   Substance Use Topics    Alcohol use: Not Currently     Comment: RARE    Drug use: Never     CURRENT ALLERGIES:   Allergies as of 10/29/2024 - Reviewed 10/29/2024   Allergen Reaction Noted    Ace inhibitors Shortness of breath, Swelling, Other, and Angioedema 01/16/2014    Lisinopril Swelling 08/01/2023    Hydroxychloroquine Other and Nausea/vomiting 04/06/2022    Sulfa (sulfonamide antibiotics) Hives 04/13/2023    Penicillins Other 01/19/2004     MEDICATIONS:    Current Facility-Administered Medications:     acetaminophen (Tylenol) tablet 650 mg, 650 mg, oral, q4h PRN **OR** acetaminophen (Tylenol) oral liquid 650 mg, 650 mg, nasogastric tube, q4h PRN **OR** acetaminophen (Tylenol) suppository 650 mg, 650 mg, rectal, q4h PRN, Hay Bajwa DO    acetaminophen (Tylenol) tablet 650 mg, 650 mg, oral, q4h PRN **OR** acetaminophen (Tylenol) oral liquid 650 mg, 650 mg, oral, q4h PRN **OR** acetaminophen (Tylenol) suppository 650 mg, 650 mg, rectal, q4h PRN, Hay Bajwa DO    atorvastatin (Lipitor) tablet 40 mg, 40 mg, oral, Nightly, Hay Bajwa DO    budesonide (Pulmicort) 0.5 mg/2 mL nebulizer solution 0.5 mg, 0.5 mg, nebulization, BID, Hay Bajwa DO, 0.5 mg at 10/30/24 0916    dextrose 5% infusion, 75 mL/hr, intravenous, Continuous, Hay Bajwa DO, Last Rate: 75 mL/hr at 10/30/24 0646, 75 mL/hr at 10/30/24 0646    ertapenem (INVanz) in sodium chloride 0.9 % 50 mL IV 1 g, 1 g, intravenous, q24h, Hay Bajwa DO, Stopped at  10/30/24 0827    heparin (porcine) injection 5,000 Units, 5,000 Units, subcutaneous, q8h, Hay Bajwa DO, 5,000 Units at 10/30/24 0804    [Held by provider] hydrocortisone (Cortef) tablet 10 mg, 10 mg, oral, q PM, Hay Bajwa DO    [Held by provider] hydrocortisone (Cortef) tablet 20 mg, 20 mg, oral, q AM, Hay Bajwa DO    hydrocortisone sodium succinate (PF) (Solu-CORTEF) injection 50 mg, 50 mg, intravenous, q6h, Hay Bajwa DO, 50 mg at 10/30/24 0956    ipratropium-albuteroL (Duo-Neb) 0.5-2.5 mg/3 mL nebulizer solution 3 mL, 3 mL, nebulization, q4h PRN, Hay Bajwa DO, 3 mL at 10/30/24 0916    levETIRAcetam (Keppra) 100 mg/mL solution 500 mg, 500 mg, oral, BID, Hay Bajwa DO    melatonin tablet 5 mg, 5 mg, oral, Nightly, Hay Bajwa DO    metoclopramide (Reglan) tablet 5 mg, 5 mg, oral, q6h PRN **OR** metoclopramide (Reglan) injection 5 mg, 5 mg, intravenous, q6h PRN, Hay Bajwa DO    [Held by provider] metoprolol tartrate (Lopressor) tablet 25 mg, 25 mg, oral, BID, Hay Bajwa DO    mycophenolate (Cellcept) capsule 1,000 mg, 1,000 mg, oral, BID, Hay Bajwa DO    ondansetron ODT (Zofran-ODT) disintegrating tablet 4 mg, 4 mg, oral, q8h PRN **OR** ondansetron (Zofran) injection 4 mg, 4 mg, intravenous, q8h PRN, Hay Bajwa DO    pantoprazole (ProtoNix) EC tablet 40 mg, 40 mg, oral, Daily before breakfast **OR** pantoprazole (ProtoNix) injection 40 mg, 40 mg, intravenous, Daily before breakfast, Hay Bajwa DO, 40 mg at 10/30/24 0754    sertraline (Zoloft) tablet 25 mg, 25 mg, oral, Daily, Hay Bajwa, DO       COMPLETE REVIEW OF SYSTEMS:    Review of systems shows no findings other than what is described in the narrative above.  Specifically, the patient has had no significant changes in eyesight, no sore throat, no post nasal drip, no ear pains.  There has been no cough or chest pains, pleuritic or otherwise.  There has been no  "abdominal pain, no nausea or vomiting, nor diarrhea.   There has been no dysuria, urinary frequency, or flank pain.  There is nothing unusual in the joints or muscles, or any skin rashes or skin swellings or abscesses noted.   All other systems were reviewed and are negative.        PHYSICAL EXAM:  Patient Vitals for the past 24 hrs:   BP Temp Temp src Pulse Resp SpO2 Height Weight   10/30/24 0919 -- -- -- -- -- 97 % -- --   10/30/24 0800 -- -- Core -- -- -- -- --   10/30/24 0700 -- 35 °C (95 °F) -- 80 18 92 % -- --   10/30/24 0600 103/70 34.8 °C (94.6 °F) -- 86 21 93 % -- --   10/30/24 0539 -- -- -- -- -- -- -- 79.6 kg (175 lb 7.8 oz)   10/30/24 0500 -- 34.8 °C (94.6 °F) -- 86 (!) 28 94 % -- --   10/30/24 0400 102/64 34.8 °C (94.6 °F) -- 86 23 95 % -- --   10/30/24 0300 -- 34.8 °C (94.6 °F) -- 86 22 95 % -- --   10/30/24 0200 112/83 34.8 °C (94.6 °F) -- 86 18 94 % -- --   10/30/24 0100 -- 34.8 °C (94.6 °F) -- 90 23 92 % -- --   10/30/24 0000 -- 34.8 °C (94.6 °F) -- 90 22 (!) 88 % -- --   10/29/24 2330 102/63 35 °C (95 °F) Bladder 90 24 95 % 1.626 m (5' 4\") 79.4 kg (175 lb 0.7 oz)   10/29/24 2230 98/53 34.7 °C (94.5 °F) -- 88 -- -- -- --   10/29/24 2215 97/58 34.7 °C (94.5 °F) -- 86 -- -- -- --   10/29/24 2200 98/56 34.7 °C (94.5 °F) -- 87 -- -- -- --   10/29/24 2145 102/61 34.7 °C (94.5 °F) -- 86 -- -- -- --   10/29/24 2130 97/57 34.6 °C (94.3 °F) -- 86 -- -- -- --   10/29/24 2115 92/56 34.4 °C (93.9 °F) -- 83 -- -- -- --   10/29/24 2100 97/61 34.2 °C (93.6 °F) -- 81 -- -- -- --   10/29/24 2045 90/55 34 °C (93.2 °F) -- 78 (!) 26 -- -- --   10/29/24 2030 94/58 (!) 33.3 °C (91.9 °F) -- 81 (!) 26 94 % -- --   10/29/24 2015 86/53 -- -- 78 (!) 27 (!) 93 % -- --   10/29/24 2010 83/53 -- -- 77 -- -- -- --   10/29/24 2000 85/56 -- -- 77 (!) 31 (!) 93 % -- --   10/29/24 1945 -- -- -- 75 (!) 25 (!) 93 % -- --   10/29/24 1930 92/53 -- -- 74 (!) 24 94 % -- --   10/29/24 1915 -- -- -- 75 (!) 24 (!) 83 % -- --   10/29/24 1904 " "96/52 -- -- 74 (!) 25 95 % -- --   10/29/24 1900 96/52 -- -- 74 (!) 25 95 % -- --   10/29/24 1845 -- -- -- 72 (!) 33 (!) 68 % -- --   10/29/24 1830 99/62 -- -- 72 (!) 24 94 % -- --   10/29/24 1819 93/59 (!) 32.6 °C (90.7 °F) Rectal 75 20 99 % 1.626 m (5' 4\") 79.4 kg (175 lb)   10/29/24 1815 -- -- -- 74 (!) 23 -- -- --     Body mass index is 30.12 kg/m².  Gen: NAD  Neck: symmetric, no mass  Cardiovascular: RRR  Respiratory: No distress   GI: Abd soft, nontender, non-distended  Extremities: no leg edema      Labs:  Lab Results   Component Value Date    WBC 8.8 10/30/2024    HGB 9.2 (L) 10/30/2024    HCT 31.7 (L) 10/30/2024    MCV 98 10/30/2024    PLT 74 (L) 10/30/2024     Lab Results   Component Value Date    GLUCOSE 123 (H) 10/30/2024    CALCIUM 8.7 10/30/2024     10/30/2024    K 4.1 10/30/2024    CO2 22 10/30/2024     (H) 10/30/2024    BUN 21 10/30/2024    CREATININE 1.71 (H) 10/30/2024   ESR: --  Lab Results   Component Value Date    SEDRATE 2 07/22/2024     Lab Results   Component Value Date    CRP 2.57 (H) 07/22/2024     Lab Results   Component Value Date    ALT 23 10/30/2024    AST 29 10/30/2024    GGT 63 (H) 01/07/2021    ALKPHOS 133 10/30/2024    BILITOT 0.7 10/30/2024       DATA:   Diagnostic tests reviewed for today's visit:    Labs this admission reviewed  Imagings this admission reviewed  Cultures: Reviewed        ASSESSMENT :   -Recurrent UTI now with pyuria and bacteriuria concerning for UTI, history of Klebsiella ESBL in the urine culture  -Hx of SLE c/b cerebritis on MMF, recurrent adrenal insufficiency, CKD3, COPD, HFmREF (EF 40-45% 5/2024), GERD, afib (not on eliquis due to thrombocytopenia), bradycardia 2/2 sinus node dysfunction s/p pacemaker (5/17/2024), CAD s/p CABG 2013, hx of AAA, DM2     PLAN:  -Continue ertapenem  -Closely monitor for antibiotics side effects including rash, Diarrhea/CDI, thrombocytopenia, JED, etc.  -Follow-up blood and urine cultures    Will continue to follow " "    Thank you so much for this consultation         Jean Cason MD.   Infectious Disease Attending        This note was partially created using voice recognition software and is inherently subject to errors including those of syntax and \"sound-alike\" substitutions which may escape proofreading. In such instances, original meaning may be extrapolated by contextual derivation       "

## 2024-10-30 NOTE — NURSING NOTE
PATIENT ADMITTED TO ROOM 2119 FROM ED;  ACCOMPANIED BY ED RN AND PARAMEDIC.  ADMISSION AND INITIAL ASSESSMENT COMPLETED.

## 2024-10-30 NOTE — CARE PLAN
The patient's goals for the shift include      The clinical goals for the shift include will remain hemodynamically stable by end of shift    Over the shift, the patient did not make progress toward the following goals. Barriers to progression include ***. Recommendations to address these barriers include ***.    Problem: Nutrition  Goal: Less than 5 days NPO/clear liquids  10/30/2024 1446 by Jennifer Reina RN  Outcome: Not Progressing  10/30/2024 1041 by Jennifer Reina RN  Outcome: Not Progressing  Goal: Oral intake greater than 50%  10/30/2024 1446 by Jennifer Reina RN  Outcome: Not Progressing  10/30/2024 1041 by Jennifer Reina RN  Outcome: Not Progressing  Goal: Consume prescribed supplement  10/30/2024 1446 by Jennifer Reina RN  Outcome: Not Progressing  10/30/2024 1041 by Jennifer Reina RN  Outcome: Not Progressing  Goal: Nutrition support goals are met within 48 hrs  10/30/2024 1446 by Jennifer Reina RN  Outcome: Not Progressing  10/30/2024 1041 by Jennifer Reina RN  Outcome: Not Progressing  Goal: Nutrition support is meeting 75% of nutrient needs  10/30/2024 1446 by Jennifer Reina RN  Outcome: Not Progressing  10/30/2024 1041 by Jennifer Reina RN  Outcome: Not Progressing  Goal: Tube feed tolerance  10/30/2024 1446 by Jennifer Reina RN  Outcome: Not Progressing  10/30/2024 1041 by Jennifer Reina RN  Outcome: Not Progressing  Goal: BG  mg/dL  10/30/2024 1446 by Jennifer Reina RN  Outcome: Met  10/30/2024 1041 by Jennifer Reina RN  Outcome: Not Progressing  Goal: Lab values WNL  10/30/2024 1446 by Jennifer Reina RN  Outcome: Progressing  10/30/2024 1041 by Jennifer Reina RN  Outcome: Not Progressing  Goal: Electrolytes WNL  10/30/2024 1446 by Jennifer Reina RN  Outcome: Progressing  10/30/2024 1041 by Jennifer Reina RN  Outcome: Not Progressing  Goal: Promote healing  10/30/2024 1446 by Jennifer Piazza, RN  Outcome: Progressing  10/30/2024 1041 by Jennifer Reina,  RN  Outcome: Not Progressing  Goal: Maintain stable weight  10/30/2024 1446 by Jennifer Reina RN  Outcome: Not Progressing  10/30/2024 1041 by Jennifer Reina RN  Outcome: Not Progressing  Goal: Reduce weight from edema/fluid  10/30/2024 1446 by Jennifer Reina RN  Outcome: Not Progressing  10/30/2024 1041 by Jennifer Reina RN  Outcome: Not Progressing  Goal: Gradual weight gain  10/30/2024 1446 by Jennifer Reina RN  Outcome: Not Progressing  10/30/2024 1041 by Jennifer Reina RN  Outcome: Not Progressing     Problem: Pain - Adult  Goal: Verbalizes/displays adequate comfort level or baseline comfort level  10/30/2024 1446 by Jennifer Reina RN  Outcome: Not Progressing  10/30/2024 1041 by Jennifer Reina RN  Outcome: Not Progressing     Problem: Safety - Adult  Goal: Free from fall injury  10/30/2024 1446 by Jennifer Reina RN  Outcome: Not Progressing  10/30/2024 1041 by Jennifer Reina RN  Outcome: Not Progressing     Problem: Discharge Planning  Goal: Discharge to home or other facility with appropriate resources  10/30/2024 1446 by Jennifer Reina RN  Outcome: Not Progressing  10/30/2024 1041 by Jennifer Reina RN  Outcome: Not Progressing     Problem: Chronic Conditions and Co-morbidities  Goal: Patient's chronic conditions and co-morbidity symptoms are monitored and maintained or improved  10/30/2024 1446 by Jennifer Reina RN  Outcome: Not Progressing  10/30/2024 1041 by Jennifer Reina RN  Outcome: Not Progressing     Problem: Skin  Goal: Participates in plan/prevention/treatment measures  10/30/2024 1446 by Jennifer Reina RN  Outcome: Not Progressing  10/30/2024 1041 by Jennifer Reina RN  Outcome: Not Progressing  Goal: Prevent/manage excess moisture  10/30/2024 1446 by Jennifer Reina RN  Outcome: Not Progressing  10/30/2024 1041 by Jennifer Reina RN  Outcome: Not Progressing  Goal: Prevent/minimize sheer/friction injuries  10/30/2024 1446 by Jennifer Reina RN  Outcome: Not  Progressing  10/30/2024 1041 by Jennifer Reina RN  Outcome: Not Progressing  Goal: Promote/optimize nutrition  10/30/2024 1446 by Jennifer Reina RN  Outcome: Not Progressing  10/30/2024 1041 by Jennifer Reina RN  Outcome: Not Progressing  Goal: Promote skin healing  10/30/2024 1446 by Jennifer Reina RN  Outcome: Not Progressing  10/30/2024 1041 by Jennifer Reina RN  Outcome: Not Progressing     Problem: Fall/Injury  Goal: Not fall by end of shift  10/30/2024 1446 by Jennifer Reina RN  Outcome: Not Progressing  10/30/2024 1041 by Jennifer Reina RN  Outcome: Not Progressing  Goal: Be free from injury by end of the shift  10/30/2024 1446 by Jennifer Reina RN  Outcome: Not Progressing  10/30/2024 1041 by Jennifer Reina RN  Outcome: Not Progressing  Goal: Verbalize understanding of personal risk factors for fall in the hospital  10/30/2024 1446 by Jennifer Reina RN  Outcome: Not Progressing  10/30/2024 1041 by Jennifer Reina RN  Outcome: Not Progressing  Goal: Verbalize understanding of risk factor reduction measures to prevent injury from fall in the home  10/30/2024 1446 by Jennifer Reina RN  Outcome: Not Progressing  10/30/2024 1041 by Jennifer Reina RN  Outcome: Not Progressing  Goal: Use assistive devices by end of the shift  10/30/2024 1446 by Jennifer Reina RN  Outcome: Not Progressing  10/30/2024 1041 by Jennifer Reina RN  Outcome: Not Progressing  Goal: Pace activities to prevent fatigue by end of the shift  10/30/2024 1446 by Jennifer Reina RN  Outcome: Not Progressing  10/30/2024 1041 by Jennifer Reina RN  Outcome: Not Progressing     Problem: Diabetes  Goal: Achieve decreasing blood glucose levels by end of shift  10/30/2024 1446 by Jennifer Reina RN  Outcome: Not Progressing  10/30/2024 1041 by Jennifer Reina RN  Outcome: Not Progressing  Goal: Increase stability of blood glucose readings by end of shift  10/30/2024 1446 by Jennifer Reina, AMINAH  Outcome: Not  Progressing  10/30/2024 1041 by Jennifer Reina RN  Outcome: Not Progressing  Goal: Decrease in ketones present in urine by end of shift  10/30/2024 1446 by Jennifer Reina RN  Outcome: Not Progressing  10/30/2024 1041 by Jennifer Reina RN  Outcome: Not Progressing  Goal: Maintain electrolyte levels within acceptable range throughout shift  10/30/2024 1446 by Jennifer Reina RN  Outcome: Not Progressing  10/30/2024 1041 by Jennifer Reina RN  Outcome: Not Progressing  Goal: Maintain glucose levels >70mg/dl to <250mg/dl throughout shift  10/30/2024 1446 by Jennifer Reina RN  Outcome: Not Progressing  10/30/2024 1041 by Jennifer Reina RN  Outcome: Not Progressing  Goal: No changes in neurological exam by end of shift  10/30/2024 1446 by Jennifer Reina RN  Outcome: Not Progressing  10/30/2024 1041 by Jennifer Reina RN  Outcome: Not Progressing  Goal: Learn about and adhere to nutrition recommendations by end of shift  10/30/2024 1446 by Jennifer Reina RN  Outcome: Not Progressing  10/30/2024 1041 by Jennifer Reina RN  Outcome: Not Progressing  Goal: Vital signs within normal range for age by end of shift  10/30/2024 1446 by Jennifer Reina RN  Outcome: Not Progressing  10/30/2024 1041 by Jennifer Reina RN  Outcome: Not Progressing  Goal: Increase self care and/or family involovement by end of shift  10/30/2024 1446 by Jennifer Reina RN  Outcome: Not Progressing  10/30/2024 1041 by Jennifer Reina RN  Outcome: Not Progressing  Goal: Receive DSME education by end of shift  10/30/2024 1446 by Jennifer Reina RN  Outcome: Not Progressing  10/30/2024 1041 by Jennifer Reina RN  Outcome: Not Progressing

## 2024-10-30 NOTE — H&P
History Of Present Illness  Bing Holliday is a 63 y.o. female presenting with increasing difficulty in tolerating p.o. intake.  This is not a new finding.  Patient has had multiple hospital admissions where difficulty maintaining reliable oral intake has been documented.  Patient was most recently discharged from this facility within the last 30 days with a Corpak in place to assist in both medication administration as well as feedings.  During that admission, she was treated for multidrug-resistant Klebsiella and septic shock with adrenal crisis.  Patient reportedly removed her Corpak which has been a common occurrence.  There is been discussion historically with patient and her son López regarding goals of care.  Patient is currently a DNAR DNI but okay with ICU level of care.  Upon arrival to this facility, patient was noted to be hypotensive and hypothermic, immediately concerning for adrenal crisis.  There is question as to whether or not she has been receiving her p.o. Solu-Cortef given her lack of oral intake.  Patient was started on active external rewarming as well as given a bolus dose of hydrocortisone, 100 mg.  A temperature sensing Montano catheter was placed for monitoring of core temperature.  Patient to be admitted for further management.     Past Medical History  Past Medical History:   Diagnosis Date    Abnormal kidney function 04/04/2023    Acute headache 03/10/2024    Acute iritis of right eye 07/24/2015    Acute low back pain 10/30/2023    Acute upper respiratory infection, unspecified 03/04/2020    Acute URI    Acute upper respiratory infection, unspecified 09/30/2015    URTI (acute upper respiratory infection)    Arthralgia of right knee 02/14/2024    Arthritis     Atypical facial pain 03/10/2024    Body mass index (BMI) 23.0-23.9, adult 10/15/2021    BMI 23.0-23.9, adult    Body mass index (BMI) 33.0-33.9, adult 03/04/2020    BMI 33.0-33.9,adult    Cardiomegaly 08/27/2013    Left ventricular  hypertrophy    Chest pain 06/10/2022    Comment on above: Added by Problem List Migration; 2013-3-12; Moved to Suppressed Nov 25 2013 9:16PM; Comment on above: Added by Problem List Migration; 2013-3-12; Moved to Suppressed Nov 25 2013 9:16PM;    Chronic kidney disease, stage 3 unspecified (Multi) 07/02/2013    Chronic kidney disease, stage III (moderate)    Confusional state 03/10/2024    Contact with and (suspected) exposure to covid-19 04/04/2023    Delirium 03/10/2024    Disease of pericardium, unspecified 07/02/2013    Pericardial disease    Encounter for follow-up examination after completed treatment for conditions other than malignant neoplasm 10/06/2022    Hospital discharge follow-up    Generalized contraction of visual field, right eye 01/29/2015    Generalized contraction of visual field of right eye    Hearing loss 03/10/2024    History of cataract 03/10/2024    History of thrombocytopenia 03/10/2024    Comment on above: Added by Problem List Migration; 2013-7-2;    Homonymous bilateral field defects, right side 04/29/2016    Homonymous bilateral field defects of right side    Hypertensive chronic kidney disease with stage 1 through stage 4 chronic kidney disease, or unspecified chronic kidney disease 07/02/2013    Nephrosclerosis    Laceration without foreign body, left foot, initial encounter 07/03/2018    Foot laceration, left, initial encounter    Localized edema 03/10/2024    Localized, primary osteoarthritis 02/14/2024    Mass of skin 03/10/2024    Migraine with aura, not intractable, without status migrainosus 10/24/2022    Ocular migraine    Open wound 03/10/2024    Open wound of left foot 03/10/2024    Other conditions influencing health status 07/02/2013    Chronic Glomerulonephritis In Diseases Classified Elsewhere    Other conditions influencing health status 07/02/2013    Progressive Familial Myoclonic Epilepsy    Other conditions influencing health status 07/02/2013    Protein S Deficiency     Other conditions influencing health status 05/22/2015    Familial Combined Hyperlipidemia    Other conditions influencing health status 10/24/2022    IDDM (insulin dependent diabetes mellitus)    Other conditions influencing health status 03/14/2022    Diabetes mellitus, insulin dependent (IDDM), uncontrolled    Other long term (current) drug therapy 10/24/2022    Long-term use of Plaquenil    Overweight with body mass index (BMI) 25.0-29.9 03/10/2024    Pain of toe 03/10/2024    Personal history of COVID-19 04/04/2023    Personal history of diseases of the blood and blood-forming organs and certain disorders involving the immune mechanism 07/02/2013    History of thrombocytopenia    Personal history of diseases of the skin and subcutaneous tissue 08/11/2015    History of foot ulcer    Personal history of nephrotic syndrome 07/02/2013    History of nephrotic syndrome    Personal history of other diseases of the circulatory system 08/27/2013    History of sinus tachycardia    Personal history of other diseases of the nervous system and sense organs     History of cataract    Personal history of other diseases of the respiratory system     History of bronchitis    Personal history of other infectious and parasitic diseases 07/02/2013    History of hepatitis    Personal history of other specified conditions     History of shortness of breath    Personal history of other specified conditions 08/27/2013    History of edema    Posterior epistaxis 03/10/2024    Puckering of macula, right eye 10/24/2022    ERM OD (epiretinal membrane, right eye)    Raynaud's syndrome without gangrene 07/02/2013    Raynaud's disease    Sinusitis 03/10/2024    Systemic lupus erythematosus, unspecified 07/24/2015    SLE (systemic lupus erythematosus)    Systemic lupus erythematosus, unspecified 07/24/2015    SLE (systemic lupus erythematosus)    Systemic lupus erythematosus, unspecified 07/24/2015    Systemic lupus    Trigeminal nerve  disorder 10/04/2024    Type 2 diabetes mellitus with diabetic nephropathy 07/02/2013    Type 2 diabetes with nephropathy    Type 2 diabetes mellitus with mild nonproliferative diabetic retinopathy without macular edema, left eye 07/27/2015    Non-proliferative diabetic retinopathy, left eye    Type 2 diabetes mellitus with mild nonproliferative diabetic retinopathy without macular edema, unspecified eye 07/24/2015    Mild non proliferative diabetic retinopathy    Type 2 diabetes mellitus with proliferative diabetic retinopathy without macular edema, right eye 07/27/2015    Proliferative diabetic retinopathy of right eye    Type 2 diabetes mellitus with proliferative diabetic retinopathy without macular edema, unspecified eye 07/24/2015    Diabetic proliferative retinopathy    Unspecified acute and subacute iridocyclitis 07/24/2015    Acute iritis, right eye    Unspecified open wound, left foot, sequela 07/03/2018    Wound, open, foot, left, sequela       Surgical History  Past Surgical History:   Procedure Laterality Date    ANKLE SURGERY  01/29/2015    Ankle Surgery    CARDIAC ELECTROPHYSIOLOGY PROCEDURE Left 5/17/2024    Procedure: PPM IMPLANT DUAL;  Surgeon: Antonio Rodriges MD;  Location: [facility];  Service: Electrophysiology;  Laterality: Left;  Pt. needs platelets and Prednisone prior to procedure    CHOLECYSTECTOMY  01/29/2015    Cholecystectomy    CT GUIDED PERCUTANEOUS BIOPSY BONE DEEP  5/4/2021    CT GUIDED PERCUTANEOUS BIOPSY BONE DEEP 5/4/2021 [facility] CLINICAL LEGACY    EYE SURGERY  03/06/2015    Eye Surgery    FOOT SURGERY  01/29/2015    Foot Surgery    MR HEAD ANGIO WO IV CONTRAST  7/26/2013    MR HEAD ANGIO WO IV CONTRAST 7/26/2013 [facility] CLINICAL LEGACY    MR HEAD ANGIO WO IV CONTRAST  9/17/2021    MR HEAD ANGIO WO IV CONTRAST 9/17/2021 [facility]    MR HEAD ANGIO WO IV CONTRAST  3/25/2023    MR HEAD ANGIO WO IV CONTRAST [facility]    MR NECK ANGIO WO IV CONTRAST  7/26/2013    MR NECK  ANGIO WO IV CONTRAST 7/26/2013 [facility] CLINICAL LEGACY    MR NECK ANGIO WO IV CONTRAST  9/17/2021    MR NECK ANGIO WO IV CONTRAST 9/17/2021 [facility] EMERGENCY LEGACY    MR NECK ANGIO WO IV CONTRAST  3/25/2023    MR NECK ANGIO WO IV CONTRAST [facility]    OTHER SURGICAL HISTORY  01/29/2015    Creation Of Pericardial Window    OTHER SURGICAL HISTORY  01/29/2015    Quadricepsplasty    TOTAL HIP ARTHROPLASTY  01/29/2015    Hip Replacement        Social History  She reports that she has never smoked. She has never been exposed to tobacco smoke. She has never used smokeless tobacco. She reports that she does not currently use alcohol. She reports that she does not use drugs.    Family History  Family History   Problem Relation Name Age of Onset    Other (RENAL DISEASE) Mother          END STAGE    Other (CARDIAC DISORDER) Mother      Cataracts Mother      Stroke Mother      Diabetes Mother      Kidney disease Mother      Lupus Mother      Other (CARDIAC DISORDER) Father      COPD Father      Glaucoma Father      Hypertension Father      Sleep apnea Father      Other (CARDIAC DISORDER) Sister      Depression Sister      Kidney disease Sister      Sickle cell trait Sister      Sleep apnea Daughter          Allergies  Ace inhibitors, Lisinopril, Hydroxychloroquine, Sulfa (sulfonamide antibiotics), and Penicillins    Review of Systems   All other systems reviewed and are negative.       Physical Exam  Vitals reviewed.   Constitutional:       Appearance: Normal appearance.   HENT:      Head: Normocephalic and atraumatic.      Nose: Nose normal.      Mouth/Throat:      Mouth: Mucous membranes are moist.   Eyes:      Extraocular Movements: Extraocular movements intact.      Conjunctiva/sclera: Conjunctivae normal.      Pupils: Pupils are equal, round, and reactive to light.   Cardiovascular:      Rate and Rhythm: Normal rate and regular rhythm.      Pulses: Normal pulses.      Heart sounds: Normal heart sounds.   Pulmonary:      Effort:  "Pulmonary effort is normal.      Breath sounds: Normal breath sounds.   Abdominal:      General: Bowel sounds are normal.      Palpations: Abdomen is soft.   Musculoskeletal:         General: Normal range of motion.      Cervical back: Normal range of motion and neck supple.   Skin:     General: Skin is warm and dry.   Neurological:      General: No focal deficit present.      Mental Status: She is alert. Mental status is at baseline.   Psychiatric:         Mood and Affect: Mood normal.         Behavior: Behavior normal.          Last Recorded Vitals  Blood pressure 97/61, pulse 81, temperature 34.2 °C (93.6 °F), resp. rate (!) 26, height 1.626 m (5' 4\"), weight 79.4 kg (175 lb), SpO2 94%.    Relevant Results  XR chest 1 view    Result Date: 10/29/2024  Interpreted By:  Jaziel Montalvo, STUDY: XR CHEST 1 VIEW;  10/29/2024 6:35 pm   INDICATION: Signs/Symptoms:weight loss, failure to thrive.     COMPARISON: 09/30/2024   ACCESSION NUMBER(S): [id number]   ORDERING CLINICIAN: THU SALAZAR   FINDINGS: Cardiomegaly. The pulmonary vasculature is congested, increased from prior study. The pulmonary vasculature is somewhat indistinct as well.   No consolidation or pleural effusion or pneumothorax.   There is a left chest wall dual lead pacemaker with right atrial and right ventricular leads.         Slightly increased degree of vascular congestion that may relate to developing CHF exacerbation.   Moderate cardiomegaly.   MACRO: None.   Signed by: Jaziel Montalvo 10/29/2024 7:35 PM Dictation workstation:   [id number]    XR abdomen 1 view    Result Date: 10/13/2024  Interpreted By:  Eitan Ji, STUDY: XR ABDOMEN 1 VIEW;  10/13/2024 3:19 am   INDICATION: Signs/Symptoms:post corpak.   COMPARISON: Abdominal radiograph 10/12/2024   ACCESSION NUMBER(S): [id number]   ORDERING CLINICIAN: KADI CABALLERO   FINDINGS: The enteric tube is positioned of the region of the mid to distal stomach. Nonobstructive bowel gas pattern. No " gross intraperitoneal free air on limited supine imaging. Degenerative changes of the lumbar spine. Partially visualized right total hip arthroplasty.       Enteric tube projects over the region of the mid to distal stomach.     Signed by: Eitan Ji 10/13/2024 4:40 AM Dictation workstation:   [id number]    XR abdomen 1 view    Result Date: 10/13/2024  Interpreted By:  Eitan Ji, STUDY: XR ABDOMEN 1 VIEW;  10/12/2024 11:53 pm   INDICATION: Signs/Symptoms:evaluate positioning of NG tube.   COMPARISON: None.   ACCESSION NUMBER(S): [id number]   ORDERING CLINICIAN: THU SALAZAR   FINDINGS: AP view of the abdomen and pelvis.   An enteric tube is subdiaphragmatic in position projecting over the region of the distal stomach versus proximal duodenum. Nonobstructive bowel gas pattern. No gross intraperitoneal free air on limited supine imaging. Degenerative changes and scoliotic curvature of the lumbar spine. Partially visualized right total hip arthroplasty.         The enteric tube projects over the region of the distal stomach/proximal duodenum.     Signed by: Eitan Ji 10/13/2024 1:33 AM Dictation workstation:   [id number]    XR abdomen 1 view    Result Date: 10/4/2024  Interpreted By:  Roni Dwyer, STUDY: XR ABDOMEN 1 VIEW;  10/3/2024 6:41 pm   INDICATION: Signs/Symptoms:NG tube placement.   COMPARISON: None.   ACCESSION NUMBER(S): [id number]   ORDERING CLINICIAN: FREDO EVANS   FINDINGS: Feeding tube tip near the gastroduodenal junction. Nonobstructive bowel gas pattern.       Feeding tube tip near the gastroduodenal junction.   MACRO: None   Signed by: Roni Dwyer 10/4/2024 8:25 AM Dictation workstation:   [id number]    US renal complete    Result Date: 10/4/2024  Interpreted By:  Tani Navarro, STUDY: US RENAL COMPLETE;  10/3/2024 9:00 pm   INDICATION: Signs/Symptoms:to assess for post renal JED.   COMPARISON: CT abdomen and pelvis 06/28/2024   ACCESSION NUMBER(S): [id number]    ORDERING CLINICIAN: ANA MONTERROSO   TECHNIQUE: Grayscale and color Doppler sonographic images of the kidneys.   FINDINGS: Evaluation limited due to bowel gas, limited acoustic windows, and difficulty with patient positioning.   RIGHT KIDNEY:  Measures 9.7 cm in length. No hydronephrosis. No calculus or perinephric fluid.   LEFT KIDNEY:  Measures 9.2 cm in length. No hydronephrosis. No calculus or perinephric fluid.   URINARY BLADDER: Partially distended. Normal.   Small volume free fluid in the pelvis.       Echogenic appearance of the renal parenchyma likely reflecting medical renal disease. No nephrolithiasis or hydronephrosis.   Small volume free fluid in the pelvis.   MACRO: None   Signed by: Tani Navarro 10/4/2024 1:12 AM Dictation workstation:   [id number]    ECG 12 lead    Result Date: 10/2/2024  Atrial fibrillation ST & T wave abnormality, consider anterolateral ischemia or digitalis effect Prolonged QT Abnormal ECG When compared with ECG of 30-SEP-2024 09:12, (unconfirmed) No significant change was found Confirmed by Mario Alberto Hopson ([id number]) on 10/2/2024 8:48:20 AM    ECG 12 lead    Result Date: 10/2/2024  Atrial fibrillation Minimal voltage criteria for LVH, may be normal variant ST & T wave abnormality, consider inferolateral ischemia or digitalis effect Prolonged QT Abnormal ECG When compared with ECG of 31-JUL-2024 13:27, No significant change was found BASELINE ARTIFACT Confirmed by Mario Alberto Hopson ([id number]) on 10/2/2024 8:19:41 AM    XR abdomen 1 view    Result Date: 10/2/2024  Interpreted By:  Tani Navarro, STUDY: XR ABDOMEN 1 VIEW; ;  10/2/2024 6:11 am   INDICATION: Signs/Symptoms:OG repositioning.   COMPARISON: Chest/abdominal radiograph 10/01/2024   ACCESSION NUMBER(S): [id number]   ORDERING CLINICIAN: FREDO EVANS   FINDINGS: Enteric tube side port and tip project below the gastroesophageal junction, in the gastric body. Limited field of view of the abdomen is unremarkable with nonobstructive  bowel-gas pattern.   Severe osteoarthritis of the left hip. Right hip arthroplasty.       Enteric tube side port and tip project below the gastroesophageal junction, in the gastric body.   MACRO: None   Signed by: Tani Navarro 10/2/2024 7:00 AM Dictation workstation:   [id number]    XR abdomen 1 view    Result Date: 10/1/2024  Interpreted By:  Hay Leahy, STUDY: XR ABDOMEN 1 VIEW;  10/1/2024 9:50 pm   INDICATION: Signs/Symptoms:NG tube placement.     COMPARISON: 07/16/2024   ACCESSION NUMBER(S): [id number]   ORDERING CLINICIAN: FREDO EVANS   FINDINGS: There is a nasogastric tube noted ending in the upper abdomen in satisfactory position.       1.  There is a nasogastric tube noted ending in the upper abdomen in satisfactory position.   MACRO: None   Signed by: Hay Leahy 10/1/2024 10:15 PM Dictation workstation:   [id number]    XR chest 1 view    Result Date: 9/30/2024  STUDY: Chest Radiograph;  9/30/2024 at 9:51 AM. INDICATION: Altered mental status. COMPARISON: XR chest 7/17/2024, 7/15/2024; CT CAP 7/15/2024, 6/28/2024. ACCESSION NUMBER(S): [id number] ORDERING CLINICIAN: ASHUTOSH GIVENS TECHNIQUE:  Frontal chest was obtained at 09:51 hours. FINDINGS: CARDIOMEDIASTINAL SILHOUETTE: Heart is mildly enlarged with left-sided cardiac pacemaker.  LUNGS: Lungs are clear.  ABDOMEN: No remarkable upper abdominal findings.  BONES: No acute osseous changes.  Severe degenerative change of the left shoulder.    No acute process. Signed by López Duke MD    CT head wo IV contrast    Result Date: 9/30/2024  Interpreted By:  Kenton Lomax, STUDY: CT HEAD WO IV CONTRAST; 9/30/2024 10:13 am   INDICATION: Altered mental status.   COMPARISON: 07/28/2020   ACCESSION NUMBER(S): [id number]   ORDERING CLINICIAN: ASHUTOSH GIVENS   TECHNIQUE: CT axial images through the Brain were obtained without contrast.   FINDINGS: There is no mass effect, hemorrhage, or infarct. Redemonstration of zone of encephalomalacia  reflecting chronic infarct in left occipital lobe. Very small subtle encephalomalacia in high left parietal lobe, also redemonstrated. Scattered lucencies in deep cerebral white matter, are nonspecific, likely on basis of chronic microvascular ischemic changes.  The visualized paranasal sinuses are clear.       No acute finding. Redemonstration of old infarct left occipital lobe and small old infarct left parietal lobe. Presumed chronic microvascular ischemic changes deep cerebral white matter.   Signed by: Kenton Lomax 9/30/2024 10:37 AM Dictation workstation:   [id number]   Results for orders placed or performed during the hospital encounter of 10/29/24 (from the past 24 hours)   Sars-CoV-2 RT PCR, Symptomatic   Result Value Ref Range    Coronavirus 2019, PCR Not Detected Not Detected   MRSA Surveillance for Vancomycin De-escalation, PCR    Specimen: Anterior Nares; Swab   Result Value Ref Range    MRSA PCR Not Detected Not Detected   CBC and Auto Differential   Result Value Ref Range    WBC 5.7 4.4 - 11.3 x10*3/uL    nRBC 1.0 (H) 0.0 - 0.0 /100 WBCs    RBC 3.21 (L) 4.00 - 5.20 x10*6/uL    Hemoglobin 9.3 (L) 12.0 - 16.0 g/dL    Hematocrit 32.1 (L) 36.0 - 46.0 %     80 - 100 fL    MCH 29.0 26.0 - 34.0 pg    MCHC 29.0 (L) 32.0 - 36.0 g/dL    RDW 22.6 (H) 11.5 - 14.5 %    Platelets 73 (L) 150 - 450 x10*3/uL    Neutrophils % 68.3 40.0 - 80.0 %    Immature Granulocytes %, Automated 3.1 (H) 0.0 - 0.9 %    Lymphocytes % 21.6 13.0 - 44.0 %    Monocytes % 6.4 2.0 - 10.0 %    Eosinophils % 0.3 0.0 - 6.0 %    Basophils % 0.3 0.0 - 2.0 %    Neutrophils Absolute 3.91 1.20 - 7.70 x10*3/uL    Immature Granulocytes Absolute, Automated 0.18 0.00 - 0.70 x10*3/uL    Lymphocytes Absolute 1.24 1.20 - 4.80 x10*3/uL    Monocytes Absolute 0.37 0.10 - 1.00 x10*3/uL    Eosinophils Absolute 0.02 0.00 - 0.70 x10*3/uL    Basophils Absolute 0.02 0.00 - 0.10 x10*3/uL   Comprehensive Metabolic Panel   Result Value Ref Range    Glucose  116 (H) 74 - 99 mg/dL    Sodium 146 (H) 136 - 145 mmol/L    Potassium 4.5 3.5 - 5.3 mmol/L    Chloride 112 (H) 98 - 107 mmol/L    Bicarbonate 22 21 - 32 mmol/L    Anion Gap 17 10 - 20 mmol/L    Urea Nitrogen 24 (H) 6 - 23 mg/dL    Creatinine 1.81 (H) 0.50 - 1.05 mg/dL    eGFR 31 (L) >60 mL/min/1.73m*2    Calcium 8.9 8.6 - 10.3 mg/dL    Albumin 3.1 (L) 3.4 - 5.0 g/dL    Alkaline Phosphatase 127 33 - 136 U/L    Total Protein 6.7 6.4 - 8.2 g/dL    AST 45 (H) 9 - 39 U/L    Bilirubin, Total 0.6 0.0 - 1.2 mg/dL    ALT 26 7 - 45 U/L   Magnesium   Result Value Ref Range    Magnesium 1.59 (L) 1.60 - 2.40 mg/dL   Phosphorus   Result Value Ref Range    Phosphorus 5.8 (H) 2.5 - 4.9 mg/dL   TSH with reflex to Free T4 if abnormal   Result Value Ref Range    Thyroid Stimulating Hormone 4.00 (H) 0.44 - 3.98 mIU/L   Troponin I, High Sensitivity, Initial   Result Value Ref Range    Troponin I, High Sensitivity 12 0 - 13 ng/L   Lactate   Result Value Ref Range    Lactate 1.0 0.4 - 2.0 mmol/L   Light Blue Top   Result Value Ref Range    Extra Tube Hold for add-ons.    Thyroxine, Free   Result Value Ref Range    Thyroxine, Free 1.11 0.61 - 1.12 ng/dL   Morphology   Result Value Ref Range    RBC Morphology See Below     Polychromasia Mild     Hypochromia Mild     RBC Fragments Few     Ovalocytes Few     Calypso Cells Few    Urinalysis with Reflex Culture and Microscopic   Result Value Ref Range    Color, Urine Light-Orange (N) Light-Yellow, Yellow, Dark-Yellow    Appearance, Urine Ex.Turbid (N) Clear    Specific Gravity, Urine 1.013 1.005 - 1.035    pH, Urine 6.0 5.0, 5.5, 6.0, 6.5, 7.0, 7.5, 8.0    Protein, Urine 70 (1+) (A) NEGATIVE, 10 (TRACE), 20 (TRACE) mg/dL    Glucose, Urine Normal Normal mg/dL    Blood, Urine 0.1 (1+) (A) NEGATIVE    Ketones, Urine NEGATIVE NEGATIVE mg/dL    Bilirubin, Urine NEGATIVE NEGATIVE    Urobilinogen, Urine Normal Normal mg/dL    Nitrite, Urine 2+ (A) NEGATIVE    Leukocyte Esterase, Urine 500 Margarita/µL (A)  "NEGATIVE   Microscopic Only, Urine   Result Value Ref Range    WBC, Urine >50 (A) 1-5, NONE /HPF    RBC, Urine 11-20 (A) NONE, 1-2, 3-5 /HPF    Bacteria, Urine 1+ (A) NONE SEEN /HPF    Mucus, Urine 1+ Reference range not established. /LPF    Hyaline Casts, Urine 2+ (A) NONE /LPF    Fine Granular Casts, Urine 3+ (A) NONE /LPF     *Note: Due to a large number of results and/or encounters for the requested time period, some results have not been displayed. A complete set of results can be found in Results Review.       Assessment/Plan   Assessment & Plan  Adrenal insufficiency (Multi)  Patient with a well established and documented history of adrenal insufficiency.  Noted to be on Solu-Cortef both in the morning and in the evening.  It is unclear if she has been reliably receiving these medications due to her poor p.o. intake and removal of Corpak.  Findings at the time of admission including hypotension, hypothermia consistent with adrenal crisis.  To be started on stress dose Solu-Cortef.  100 mg bolus has been ordered by ED physicians and should be given immediately.  Following this bolus dose, will continue 50 mg every 6 hours before transitioning back to her oral agents which include 20 mg every morning and 10 mg every afternoon  Hypothermia, initial encounter  Lowest core temperature noted at the time of admission 90.7.  This places her in the mild hypothermia category.  Patient has been started on active external rewarming with \"bear hugger\" application.  Maintain a warm environment, ensure that clothing/dressings are not saturated, and we will maintain bear hugger at this time.  Anticipate that with correction of adrenal insufficiency, will see additional improvement in her core temperature.  Maintain every 2 hour vitals while undergoing active rewarming.  Hypotension  Likely secondary to adrenal insufficiency.  However cannot exclude underlying infectious etiology given her recent diagnosis of " multidrug-resistant Klebsiella and UA at the time of admission being grossly abnormal.  Patient was given vancomycin and Zosyn.  With the suspected infectious source being  in origin, will change to ertapenem based on previous culture data.  ID service to be consulted given the scenario for antibiotic recommendations and management.  Of note, during previous ertapenem administration there was close monitoring for side effects including rash, diarrhea, thrombocytopenia, JED.  Platelet count at that time noted to be 79.  Creatinine noted to be 1.69.  Today, platelets are noted to be 73 and creatinine is noted to be 1.81.  Will require continued cautious monitoring for these potential side effects.  Ultimately defer to recommendations of ID service for antibiotic selection.  Acute cystitis without hematuria  As described above, recent urine culture noting greater than 100,000 CFU of Klebsiella pneumonia/vera cola.  ESBL producing organism noted.  Ertapenem to be continued at this time with ID consultation.  DM type 2 with diabetic peripheral neuropathy  Every 4 hour insulin sliding scale coverage.  However we will defer initiation of insulin until resolution of the acute adrenal crisis given the propensity for sustained hypoglycemic episodes during acute crisis.  Therefore every 4 hour monitoring without coverage to begin this admission and once glucose trends are established, will initiate coverage at that time as indicated    Diet: N.p.o.  Fluids: D5 water at 75 cc/h x 13 hours  DVT prophylaxis: Continuing home heparin  Telemetry: Indicated    Home Medications: Complete       I spent >75 minutes in the professional and overall care of this patient.      Hay Bajwa,

## 2024-10-30 NOTE — CARE PLAN
The patient's goals for the shift include      The clinical goals for the shift include will remain hemodynamically stable by end of shift    Over the shift, the patient did not make progress toward the following goals. Barriers to progression include ***. Recommendations to address these barriers include ***.    Problem: Nutrition  Goal: Less than 5 days NPO/clear liquids  Outcome: Not Progressing  Goal: Oral intake greater than 50%  Outcome: Not Progressing  Goal: Consume prescribed supplement  Outcome: Not Progressing  Goal: Nutrition support goals are met within 48 hrs  Outcome: Not Progressing  Goal: Nutrition support is meeting 75% of nutrient needs  Outcome: Not Progressing  Goal: Tube feed tolerance  Outcome: Not Progressing  Goal: BG  mg/dL  Outcome: Not Progressing  Goal: Lab values WNL  Outcome: Not Progressing  Goal: Electrolytes WNL  Outcome: Not Progressing  Goal: Promote healing  Outcome: Not Progressing  Goal: Maintain stable weight  Outcome: Not Progressing  Goal: Reduce weight from edema/fluid  Outcome: Not Progressing  Goal: Gradual weight gain  Outcome: Not Progressing     Problem: Pain - Adult  Goal: Verbalizes/displays adequate comfort level or baseline comfort level  Outcome: Not Progressing     Problem: Safety - Adult  Goal: Free from fall injury  Outcome: Not Progressing     Problem: Discharge Planning  Goal: Discharge to home or other facility with appropriate resources  Outcome: Not Progressing     Problem: Chronic Conditions and Co-morbidities  Goal: Patient's chronic conditions and co-morbidity symptoms are monitored and maintained or improved  Outcome: Not Progressing     Problem: Skin  Goal: Participates in plan/prevention/treatment measures  Outcome: Not Progressing  Goal: Prevent/manage excess moisture  Outcome: Not Progressing  Goal: Prevent/minimize sheer/friction injuries  Outcome: Not Progressing  Goal: Promote/optimize nutrition  Outcome: Not Progressing  Goal: Promote  skin healing  Outcome: Not Progressing     Problem: Fall/Injury  Goal: Not fall by end of shift  Outcome: Not Progressing  Goal: Be free from injury by end of the shift  Outcome: Not Progressing  Goal: Verbalize understanding of personal risk factors for fall in the hospital  Outcome: Not Progressing  Goal: Verbalize understanding of risk factor reduction measures to prevent injury from fall in the home  Outcome: Not Progressing  Goal: Use assistive devices by end of the shift  Outcome: Not Progressing  Goal: Pace activities to prevent fatigue by end of the shift  Outcome: Not Progressing     Problem: Diabetes  Goal: Achieve decreasing blood glucose levels by end of shift  Outcome: Not Progressing  Goal: Increase stability of blood glucose readings by end of shift  Outcome: Not Progressing  Goal: Decrease in ketones present in urine by end of shift  Outcome: Not Progressing  Goal: Maintain electrolyte levels within acceptable range throughout shift  Outcome: Not Progressing  Goal: Maintain glucose levels >70mg/dl to <250mg/dl throughout shift  Outcome: Not Progressing  Goal: No changes in neurological exam by end of shift  Outcome: Not Progressing  Goal: Learn about and adhere to nutrition recommendations by end of shift  Outcome: Not Progressing  Goal: Vital signs within normal range for age by end of shift  Outcome: Not Progressing  Goal: Increase self care and/or family involovement by end of shift  Outcome: Not Progressing  Goal: Receive DSME education by end of shift  Outcome: Not Progressing

## 2024-10-30 NOTE — PROGRESS NOTES
Speech-Language Pathology    SLP Adult Inpatient Clinical Swallow Evaluation    Patient Name: Bing Holliday  MRN: [id number]  Today's Date: 10/30/2024   Time Calculation  Start Time: 1849  Stop Time: 1901  Time Calculation (min): 12 min       Current Problem:   1. Hypothermia, initial encounter  Warming blanket      2. Adrenal crisis (Multi)        3. Severe protein-calorie malnutrition (Multi)          Recommendations:  Diet: NPO with alternate means nutrition/hydration  Medication Administration: Non oral  Strategies: Sit upright (30-45 degrees) at all times; frequent, aggressive oral care to improve infection control as well as reduce dental plaque and bacteria on oropharyngeal surfaces (which may increase the risk nosocomial infections, including pneumonia)    Swallow Reassessment     Assessment:  Consistencies Trialed:   - Pureed (IDDSI Level 4)  Oral motor exam: Unable to fully assess oral strength/ROM and volitional swallow/cough 2/2 patient difficulty following oral directives (versus could not execute same). Patient could complete labial retraction and protrusion, although with significantly reduced strength/ROM. Patient completed lingual lateralization to R only, however, could elevate tongue to middle of upper lip. It should be noted that following oral care and elicitation of oral motor movements patient could achieve, patient with anterior spillage of saliva x3. However, spontaneous, strong coughing with subsequent spontaneous pharyngeal swallow initiation noted x2 prior to PO introduction.   Patient with severe oral dysphagia, characterized by significantly decreased to no bolus manipulation/mastication/A-P propulsion. Although patient able to elevate tongue to actively and successfully remove minuscule amount of puree placed on upper lip, patient with no further bolus manipulation/mastication/A-P propulsion or pharyngeal swallow initiation. Patient unable to clear same. Attempted to clear lingual  stasis with an oral swab, however, patient was biting down on same, necessitating use of a Yankauer (which removed lingual stasis). Unable to assess pharyngeal phase of swallowing as a result. Further bolus presentation was deferred.   Orientation could not be assessed at this time as only three unrelated single syllable utterances only could be elicited during this evaluation.    Patient is considered to be at risk for aspiration.   Per the results of this assessment, patient is not appropriate for PO at this time. Please refer to recommendations and precautions as noted above.   ST to continue to follow patient during inpatient stay.      Plan:   Skilled speech therapy for dysphagia treatment is warranted for ongoing assessment of oropharyngeal swallow function, to ensure tolerance of safest and least restrictive consistencies, to provide training and instruction regarding the use of compensatory swallow strategies and patient/caregiver education in order to reduce risk of aspiration, dehydration and malnutrition.  Frequency: 2x/wk  Duration: 2 weeks  SLP Discharge Recommendations: To be determined pending progress in therapy during hospital course     Goals:  1. Patient will tolerate PO with no overt s/s of aspiration in 90% of observed therapeutic trials.  2. Patient/caregiver will implement safe swallowing strategies to reduce risk of aspiration in 90% of trials given caregiver assistance/cueing as needed.     Subjective:  Patient was seen at bedside for clinical swallow evaluation, and assisted into an upright position for PO trials. Although patient with waxing/waning alertness levels and an opened mouth posture at rest, a slight smile could be elicited upon SLP introduction. Vocal intensity was slightly reduced.     Baseline Assessment:  O2 use: Room air     General Visit Information:  Clinical Swallow Evaluation ordered due to concern for dysphagia. Current diet level is NPO. Baseline diet is Puree with  "thin liquids.  AMINAH Gutiérrez reports patient has been very lethargic this date.      History of Present Illness: Per EMR, \"Bing Holliday is a 63 y.o. female presenting with increasing difficulty in tolerating p.o. intake.  This is not a new finding.  Patient has had multiple hospital admissions where difficulty maintaining reliable oral intake has been documented.  Patient was most recently discharged from this facility within the last 30 days with a Corpak in place to assist in both medication administration as well as feedings.  During that admission, she was treated for multidrug-resistant Klebsiella and septic shock with adrenal crisis.  Patient reportedly removed her Corpak which has been a common occurrence.  There is been discussion historically with patient and her son López regarding goals of care.  Patient is currently a DNAR DNI but okay with ICU level of care.  Upon arrival to this facility, patient was noted to be hypotensive and hypothermic, immediately concerning for adrenal crisis.  There is question as to whether or not she has been receiving her p.o. Solu-Cortef given her lack of oral intake.  Patient was started on active external rewarming as well as given a bolus dose of hydrocortisone, 100 mg. Patient to be admitted for further management.  Past Medical History:   Acute upper respiratory infection     Atypical facial pain    Cardiomegaly    Left ventricular hypertrophy   Chronic kidney disease, stage 3     Delirium    Pericardial disease   Thrombocytopenia   Homonymous bilateral field defects, right side    Migraine with aura, not intractable, without status      migrainosus    Chronic Glomerulonephritis      Progressive Familial Myoclonic Epilepsy   Protein S Deficiency             Hyperlipidemia   Type 2 Diabetes mellitus, insulin dependent (IDDM), uncontrolled   COVID-19    Sinus tachycardia    Bronchitis   Hepatitis             Shortness of breath   Raynaud's syndrome without " "gangrene   Sinusitis    Systemic lupus erythematosus, unspecified    Trigeminal nerve disorder.\"         Pain:  Rating 0-10:?0?     Education:   Learner patient; RN  Barriers to Learning Acuity of illness; cognitive communication  limitations  Method demonstration; verbal; visual  Education-Topic SLP provided patient education regarding role of SLP, purpose of assessment and clinical impressions/recommendations. Patient verbalized questionable full comprehension, consistent with cognitive status. SLP further coordinated with RN regarding recommendations and precautions per this assessment, with RN verbalizing understanding.  Outcome: Needs review and reinforcement                                            "

## 2024-10-30 NOTE — NURSING NOTE
4 eyes skin check completed. New right heel pressure injury identified. BLE wounds are healed. Skin is dry/ flaky/ intact/ open to air.  Pt noted to have moisture associated dermatitis to the periarea. Hygiene compelted and triad applied.

## 2024-10-30 NOTE — PROGRESS NOTES
Bing Holliday is a 63 y.o. female on day 1 of admission presenting with Hypothermia, initial encounter.      Subjective   Patient quite sleepy.  Was on warming blanket on his evaluated her.  Unable to get much of the information from patient.  Spoke with patient's son López who is medical power of  at [phone].  He wants to speak with GI and explore the absence of bacteria.       Objective     Last Recorded Vitals  BP 97/59   Pulse 90   Temp 36 °C (96.8 °F)   Resp 22   Wt 79.6 kg (175 lb 7.8 oz)   SpO2 97%   Intake/Output last 3 Shifts:    Intake/Output Summary (Last 24 hours) at 10/30/2024 1250  Last data filed at 10/30/2024 0754  Gross per 24 hour   Intake 3485 ml   Output 305 ml   Net 3180 ml       Admission Weight  Weight: 79.4 kg (175 lb) (10/29/24 1819)    Daily Weight  10/30/24 : 79.6 kg (175 lb 7.8 oz)      Physical Exam:  General: Sleepy but wakes up.  Warming blanket on  HEENT: PERRLA, head intact and normocephalic.  Dry mucous membrane  Neck: Normal to inspection  Lungs: Clear to auscultation, work of breathing within normal limit  Cardiac: Regular rate and rhythm  Abdomen: Soft nontender, positive bowel sounds  : Exam deferred  Skin: Right heel wound noted.  Hematology: No petechia or excessive ecchymosis  Musculoskeletal: Without significant trauma  Neurological:   Psych: Unable to assess    Relevant Results  Scheduled medications  atorvastatin, 40 mg, oral, Nightly  budesonide, 0.5 mg, nebulization, BID  ertapenem, 1 g, intravenous, q24h  heparin (porcine), 5,000 Units, subcutaneous, q8h  [Held by provider] hydrocortisone, 10 mg, oral, q PM  [Held by provider] hydrocortisone, 20 mg, oral, q AM  hydrocortisone sodium succinate, 50 mg, intravenous, q6h  levETIRAcetam, 500 mg, intravenous, q12h  melatonin, 5 mg, oral, Nightly  [Held by provider] metoprolol tartrate, 25 mg, oral, BID  mycophenolate, 1,000 mg, oral, BID  pantoprazole, 40 mg, oral, Daily before breakfast    Or  pantoprazole, 40 mg, intravenous, Daily before breakfast  sertraline, 25 mg, oral, Daily      Continuous medications  dextrose 5%, 75 mL/hr, Last Rate: 75 mL/hr (10/30/24 1237)      PRN medications  PRN medications: acetaminophen **OR** acetaminophen **OR** acetaminophen, acetaminophen **OR** acetaminophen **OR** acetaminophen, ipratropium-albuteroL, ondansetron ODT **OR** ondansetron   Labs  Results from last 7 days   Lab Units 10/30/24  0408 10/29/24  1845   WBC AUTO x10*3/uL 8.8 5.7   HEMOGLOBIN g/dL 9.2* 9.3*   HEMATOCRIT % 31.7* 32.1*   PLATELETS AUTO x10*3/uL 74* 73*     Results from last 7 days   Lab Units 10/30/24  0408 10/29/24  1845   SODIUM mmol/L 144 146*   POTASSIUM mmol/L 4.1 4.5   CHLORIDE mmol/L 111* 112*   CO2 mmol/L 22 22   BUN mg/dL 21 24*   CREATININE mg/dL 1.71* 1.81*   CALCIUM mg/dL 8.7 8.9   PROTEIN TOTAL g/dL 6.2* 6.7   BILIRUBIN TOTAL mg/dL 0.7 0.6   ALK PHOS U/L 133 127   ALT U/L 23 26   AST U/L 29 45*   GLUCOSE mg/dL 123* 116*       XR chest 1 view    Result Date: 10/29/2024  Interpreted By:  Jaziel Montalvo, STUDY: XR CHEST 1 VIEW;  10/29/2024 6:35 pm   INDICATION: Signs/Symptoms:weight loss, failure to thrive.     COMPARISON: 09/30/2024   ACCESSION NUMBER(S): [id number]   ORDERING CLINICIAN: THU SALAZAR   FINDINGS: Cardiomegaly. The pulmonary vasculature is congested, increased from prior study. The pulmonary vasculature is somewhat indistinct as well.   No consolidation or pleural effusion or pneumothorax.   There is a left chest wall dual lead pacemaker with right atrial and right ventricular leads.         Slightly increased degree of vascular congestion that may relate to developing CHF exacerbation.   Moderate cardiomegaly.   MACRO: None.   Signed by: Jaziel Montalvo 10/29/2024 7:35 PM Dictation workstation:   [id number]    XR abdomen 1 view    Result Date: 10/13/2024  Interpreted By:  Eitan Ji, STUDY: XR ABDOMEN 1 VIEW;  10/13/2024 3:19 am   INDICATION:  Signs/Symptoms:post corpak.   COMPARISON: Abdominal radiograph 10/12/2024   ACCESSION NUMBER(S): [id number]   ORDERING CLINICIAN: KADI CABALLERO   FINDINGS: The enteric tube is positioned of the region of the mid to distal stomach. Nonobstructive bowel gas pattern. No gross intraperitoneal free air on limited supine imaging. Degenerative changes of the lumbar spine. Partially visualized right total hip arthroplasty.       Enteric tube projects over the region of the mid to distal stomach.     Signed by: Eitan Ji 10/13/2024 4:40 AM Dictation workstation:   [id number]    XR abdomen 1 view    Result Date: 10/13/2024  Interpreted By:  Eitan Ji, STUDY: XR ABDOMEN 1 VIEW;  10/12/2024 11:53 pm   INDICATION: Signs/Symptoms:evaluate positioning of NG tube.   COMPARISON: None.   ACCESSION NUMBER(S): [id number]   ORDERING CLINICIAN: THU SALAZAR   FINDINGS: AP view of the abdomen and pelvis.   An enteric tube is subdiaphragmatic in position projecting over the region of the distal stomach versus proximal duodenum. Nonobstructive bowel gas pattern. No gross intraperitoneal free air on limited supine imaging. Degenerative changes and scoliotic curvature of the lumbar spine. Partially visualized right total hip arthroplasty.         The enteric tube projects over the region of the distal stomach/proximal duodenum.     Signed by: Eitan Ji 10/13/2024 1:33 AM Dictation workstation:   [id number]    XR abdomen 1 view    Result Date: 10/4/2024  Interpreted By:  Roni Dwyer, STUDY: XR ABDOMEN 1 VIEW;  10/3/2024 6:41 pm   INDICATION: Signs/Symptoms:NG tube placement.   COMPARISON: None.   ACCESSION NUMBER(S): [id number]   ORDERING CLINICIAN: FREDO EVANS   FINDINGS: Feeding tube tip near the gastroduodenal junction. Nonobstructive bowel gas pattern.       Feeding tube tip near the gastroduodenal junction.   MACRO: None   Signed by: Roni Dwyer 10/4/2024 8:25 AM Dictation workstation:   [id number]    US  renal complete    Result Date: 10/4/2024  Interpreted By:  Tani Navarro, STUDY: US RENAL COMPLETE;  10/3/2024 9:00 pm   INDICATION: Signs/Symptoms:to assess for post renal JED.   COMPARISON: CT abdomen and pelvis 06/28/2024   ACCESSION NUMBER(S): [id number]   ORDERING CLINICIAN: ANA MONTERROSO   TECHNIQUE: Grayscale and color Doppler sonographic images of the kidneys.   FINDINGS: Evaluation limited due to bowel gas, limited acoustic windows, and difficulty with patient positioning.   RIGHT KIDNEY:  Measures 9.7 cm in length. No hydronephrosis. No calculus or perinephric fluid.   LEFT KIDNEY:  Measures 9.2 cm in length. No hydronephrosis. No calculus or perinephric fluid.   URINARY BLADDER: Partially distended. Normal.   Small volume free fluid in the pelvis.       Echogenic appearance of the renal parenchyma likely reflecting medical renal disease. No nephrolithiasis or hydronephrosis.   Small volume free fluid in the pelvis.   MACRO: None   Signed by: Tani Navarro 10/4/2024 1:12 AM Dictation workstation:   [id number]    ECG 12 lead    Result Date: 10/2/2024  Atrial fibrillation ST & T wave abnormality, consider anterolateral ischemia or digitalis effect Prolonged QT Abnormal ECG When compared with ECG of 30-SEP-2024 09:12, (unconfirmed) No significant change was found Confirmed by Mario Alberto Hopson ([id number]) on 10/2/2024 8:48:20 AM    XR abdomen 1 view    Result Date: 10/2/2024  Interpreted By:  Tani Navarro, STUDY: XR ABDOMEN 1 VIEW; ;  10/2/2024 6:11 am   INDICATION: Signs/Symptoms:OG repositioning.   COMPARISON: Chest/abdominal radiograph 10/01/2024   ACCESSION NUMBER(S): [id number]   ORDERING CLINICIAN: FREDO EVANS   FINDINGS: Enteric tube side port and tip project below the gastroesophageal junction, in the gastric body. Limited field of view of the abdomen is unremarkable with nonobstructive bowel-gas pattern.   Severe osteoarthritis of the left hip. Right hip arthroplasty.       Enteric tube side  port and tip project below the gastroesophageal junction, in the gastric body.   MACRO: None   Signed by: Tani Navarro 10/2/2024 7:00 AM Dictation workstation:   [id number]    XR abdomen 1 view    Result Date: 10/1/2024  Interpreted By:  Hay Lehay, STUDY: XR ABDOMEN 1 VIEW;  10/1/2024 9:50 pm   INDICATION: Signs/Symptoms:NG tube placement.     COMPARISON: 07/16/2024   ACCESSION NUMBER(S): [id number]   ORDERING CLINICIAN: FREDO EVANS   FINDINGS: There is a nasogastric tube noted ending in the upper abdomen in satisfactory position.       1.  There is a nasogastric tube noted ending in the upper abdomen in satisfactory position.   MACRO: None   Signed by: Hay Leahy 10/1/2024 10:15 PM Dictation workstation:   [id number]                    Assessment/Plan   Bing Holliday is a 63 y.o. female on day 1 of admission presenting with Hypothermia, initial encounter.  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    Assessment:  Hypothermia  Adrenal insufficiency  Lupus with complicated lupus cerebritis  CKD stage III  Poor oral intake  HFpEF 40 to 45%  Sinus node dysfunction status post PPM  CAD  COPD  GERD  Thrombocytopenia  Recurrent Klebsiella ESBL UTI    Plan:  Continue with warming measures  Spoke with son about goals of care and recommended hospice but he stated that he just spoke with mom over the  weekend and she is not ready for hospice and wants to continue treatment  Will switch to hydrocortisone 50 every 6  Endocrinology consult  Continue with Invanz  Follow-up culture result  Spoke with son regarding goals of care and about nutrition and recommended hospice but he states he would like to continue treatment for the time being and speak with GI  He would like to explore the option of PEG tube  Repeat labs for tomorrow ordered  Heparin for DVT prophylaxis       Plan discussed with patient and primary nurse at bedside.  Spoke with patient's son López who is medical power of  at [phone] for 15 minutes    High level of MDM based on above issue and discussing plan    This note is created using voice recognition software. All efforts are made to minimize errors, if there are errors there due to transcription.    Darek Felipe  Hospitalist

## 2024-10-30 NOTE — PROGRESS NOTES
Nutrition Initial Assessment:   Nutrition Assessment    Reason for Assessment: Admission nursing screening    Nutrition Note:  Bing Holliday is a 63 y.o. female presenting 10/29 from [facility] with failed corpak placement x4 with no meds x72 hours with family requesting alternate form of nutrition; work up in ED finding pt hypotensive and hypothermic likely related to recurrent adrenal crisis as well as UTI. All cultures are pending; bear hugger in place. GI and ST consults pending.    Past Medical History  9/30-10/11/2024 [facility] with UTI, bacteremia, septic shock, adrenal insufficiency crisis, hypoglycemia with dobbhoff placed 10/3 due to poor oral intakes  7/30-8/2/2024 [facility] for rheumatologic evaluation  7/15/-7/30/2024 [facility] for adrenal crisis with PPN 7/22-7/25/2024  SLE, lupus cerebritis, RA, Raynaud's syndrome, hyperparathyroidism, adrenal insufficiency, COPD, DM, HTN, HLD, atrial fibrillation (not on anticoagulation), CKD, pacemaker, seizures, psychosis   Surgical History   has a past surgical history that includes Eye surgery (03/06/2015); Total hip arthroplasty (01/29/2015); Cholecystectomy (01/29/2015); Other surgical history (01/29/2015); Other surgical history (01/29/2015); Ankle surgery (01/29/2015); Foot surgery (01/29/2015); MR angio head wo IV contrast (7/26/2013); MR angio neck wo IV contrast (7/26/2013); CT guided percutaneous biopsy bone deep (5/4/2021); MR angio head wo IV contrast (9/17/2021); MR angio neck wo IV contrast (9/17/2021); MR angio neck wo IV contrast (3/25/2023); MR angio head wo IV contrast (3/25/2023); and Cardiac electrophysiology procedure (Left, 5/17/2024).       Nutrition History:  Energy Intake: Poor < 50 %  Food and Nutrient History: 10/30: Pt from [facility]; since 10/28 diet as puree with NECTAR thick liquids; corpak out for minimum of 72 hours.  PCP at [facility] noting pt with rapid weight loss and poor oral intakes.  Vitamin/Herbal Supplement Use: home meds  "include cortef, humalog, melatonin, cellcept  Food Allergies/Intolerances:  None  GI Symptoms:  very poor appetite.  Oral Problems: Chewing difficulty, Swallowing difficulty, and diet at [facility] initially as regular/mech soft/thin liquids however transitioned to puree and NECTAR thick liquids 10/28 due to lethargy and unable to safely swallow solid foods. Pt had a corpak at facility however pt pulled it out and unable to replace.        Anthropometrics:  Height per arvhices: 180.3kg (5'11\")  Weight: 79.6 kg (175 lb 7.8 oz)   BMI calculated 24.6kg/m2   Admit weight measured 79.6kg   IBW 70.5kg       Weight History:   9/30: 84.7kg 6% x1 month  7/2024: 93.7kg 15% x3 months  6/2024: 95.7kg  4/2024: 90.9kg 12% x6 months  10/2023: 94.6kg 15.8% x1 yr  Weight Change %: unintentional significant losses at least through 6 months.        Nutrition Focused Physical Exam Findings:  limited as bear hugger in place  Subcutaneous Fat Loss:   Orbital Fat Pads: Severe (dark circles, hollowing and loose skin)  Buccal Fat Pads: Severe (hollow, sunken and narrow face)  Triceps: Defer  Ribs: Defer  Muscle Wasting:  Temporalis: Severe (hollowed scooping depression)  Pectoralis (Clavicular Region): Severe (protruding prominent clavicle)  Deltoid/Trapezius: Severe (squared shoulders, acromion process prominent)  Interosseous: Defer  Trapezius/Infraspinatus/Supraspinatus (Scapular Region): Defer  Quadriceps: Defer  Gastrocnemius: Defer  Edema:  Edema Location: nonpitting BLE edema  Physical Findings:  Hair: Positive (thinning)  Mouth: Positive (drooling onto pillow)  Skin: Positive (loose, dry; sacrum stage II, right heel DTI)    Nutrition Significant Labs:  BMP Trend:   Results from last 7 days   Lab Units 10/30/24  0408 10/29/24  1845   GLUCOSE mg/dL 123* 116*   CALCIUM mg/dL 8.7 8.9   SODIUM mmol/L 144 146*   POTASSIUM mmol/L 4.1 4.5   CO2 mmol/L 22 22   CHLORIDE mmol/L 111* 112*   BUN mg/dL 21 24*   CREATININE mg/dL 1.71* 1.81* "    , A1C:  Lab Results   Component Value Date    HGBA1C 6.8 (H) 06/04/2024   , BG POCT trend:   Results from last 7 days   Lab Units 10/30/24  1144 10/30/24  0816 10/30/24  0408 10/29/24  2346   POCT GLUCOSE mg/dL 140* 131* 114* 89    , Liver Function Trend:   Results from last 7 days   Lab Units 10/30/24  0408 10/29/24  1845   ALK PHOS U/L 133 127   AST U/L 29 45*   ALT U/L 23 26   BILIRUBIN TOTAL mg/dL 0.7 0.6        Nutrition Specific Medications:  Scheduled medications  atorvastatin, 40 mg, oral, Nightly  budesonide, 0.5 mg, nebulization, BID  ertapenem, 1 g, intravenous, q24h  heparin (porcine), 5,000 Units, subcutaneous, q8h  [Held by provider] hydrocortisone, 10 mg, oral, q PM  [Held by provider] hydrocortisone, 20 mg, oral, q AM  hydrocortisone sodium succinate, 50 mg, intravenous, q6h  levETIRAcetam, 500 mg, intravenous, q12h  melatonin, 5 mg, oral, Nightly  [Held by provider] metoprolol tartrate, 25 mg, oral, BID  mycophenolate, 1,000 mg, oral, BID  pantoprazole, 40 mg, oral, Daily before breakfast   Or  pantoprazole, 40 mg, intravenous, Daily before breakfast  sertraline, 25 mg, oral, Daily      Continuous medications  dextrose 5%, 75 mL/hr, Last Rate: 75 mL/hr (10/30/24 1237)      PRN medications  PRN medications: acetaminophen **OR** acetaminophen **OR** acetaminophen, acetaminophen **OR** acetaminophen **OR** acetaminophen, ipratropium-albuteroL, ondansetron ODT **OR** ondansetron     I/O:   Last BM Date: 10/29/24; Stool Appearance: Loose (10/29/24 2330)    Dietary Orders (From admission, onward)       Start     Ordered    10/30/24 0010  May Not Participate in Room Service  ( ROOM SERVICE MAY NOT PARTICIPATE)  Once        Question:  .  Answer:  Yes    10/30/24 0009    10/29/24 2356  NPO Diet; Effective now  Diet effective now         10/29/24 2355                     Estimated Needs:   Total Energy Estimated Needs (kCal):  (2200-2500kcal (28-32kcal/kg of 79.6kg))     Total Protein Estimated Needs  (g):  (92-115g (1.3-1.6g/kg IBW of 70.5kg))     Total Fluid Estimated Needs (mL):  (1mL/kcal/d or as per physician)           Nutrition Diagnosis   Malnutrition Diagnosis  Patient has Malnutrition Diagnosis: Yes  Diagnosis Status: New  Malnutrition Diagnosis: Severe malnutrition related to chronic disease or condition  As Evidenced by: recurrent adrenal crisis with inconsistent oral intakes >1 month resulting in 6% weight loss x1 month, 15% x3 months and 12% x6 months and severe fat and muscle mass wasting (Pt EN dependent 10/3-approximately 10/26/2024 when pt reportedly pulled out corpak.)  Additional Assessment Information: 10/30: GI and ST consult pending.            Nutrition Interventions/Recommendations         Nutrition Prescription:  Individualized Nutrition Prescription Provided for : Enteral Nutrition        Nutrition Interventions:   Interventions: Enteral intake  Enteral Intake: Insert enteral feeding tube  Goal: Pending plan of care, once enteral access obtained, suggest tube feeds with Vital 1.5 starting at 20mL/hr and increase by 10mL every 6-8 hours until goal of 60mL/hr is reached for 2160kcal, 97g pro, and 1100mL formula free water or 27kcal/kg and 1.4g pro/kg IBW.  Adjust water flush pending fluid needs--upon most recent discharge, water flushes were 300mL 6x/day.  Additional Interventions: Please consider Palliative Care referral for goal of care (see PCP note at [facility] written 10/25/2024).    Collaboration and Referral of Nutrition Care: Collaboration by nutrition professional with other providers  Goal: AMINAH Rodriguez    Nutrition Education:   10/30: Pt not appropriate for education; no family at bedside.        Nutrition Monitoring and Evaluation   Food/Nutrient Related History Monitoring  Monitoring and Evaluation Plan: Energy intake  Energy Intake: Estimated energy intake  Criteria: >75% of estimated nutrient needs via EN; once pt able to tolerate oral, suggest oral as pleasure feeds only.    Body  Composition/Growth/Weight History  Monitoring and Evaluation Plan: Weight  Weight: Measured weight  Criteria: daily weight    Biochemical Data, Medical Tests and Procedures  Monitoring and Evaluation Plan: Electrolyte/renal panel, Glucose/endocrine profile  Electrolyte and Renal Panel: Magnesium, Phosphorus, Potassium, Sodium  Criteria: lytes WNR  Glucose/Endocrine Profile: Glucose, casual, Glucose, fasting  Criteria: BG 70-180mg/dL    Nutrition Focused Physical Findings  Monitoring and Evaluation Plan: Skin  Skin: Impaired wound healing  Criteria: promote skin integrity         Time Spent (min): 75 minutes

## 2024-10-31 LAB
ACANTHOCYTES BLD QL SMEAR: NORMAL
ALBUMIN SERPL BCP-MCNC: 3 G/DL (ref 3.4–5)
ALP SERPL-CCNC: 127 U/L (ref 33–136)
ALT SERPL W P-5'-P-CCNC: 20 U/L (ref 7–45)
ANION GAP SERPL CALC-SCNC: 16 MMOL/L (ref 10–20)
AST SERPL W P-5'-P-CCNC: 20 U/L (ref 9–39)
ATRIAL RATE: 73 BPM
BACTERIA UR CULT: ABNORMAL
BASOPHILS # BLD AUTO: 0.04 X10*3/UL (ref 0–0.1)
BASOPHILS NFR BLD AUTO: 0.4 %
BILIRUB SERPL-MCNC: 0.5 MG/DL (ref 0–1.2)
BUN SERPL-MCNC: 21 MG/DL (ref 6–23)
CALCIUM SERPL-MCNC: 9.3 MG/DL (ref 8.6–10.3)
CHLORIDE SERPL-SCNC: 111 MMOL/L (ref 98–107)
CO2 SERPL-SCNC: 20 MMOL/L (ref 21–32)
CREAT SERPL-MCNC: 1.94 MG/DL (ref 0.5–1.05)
EGFRCR SERPLBLD CKD-EPI 2021: 29 ML/MIN/1.73M*2
EOSINOPHIL # BLD AUTO: 0 X10*3/UL (ref 0–0.7)
EOSINOPHIL NFR BLD AUTO: 0 %
ERYTHROCYTE [DISTWIDTH] IN BLOOD BY AUTOMATED COUNT: 22.4 % (ref 11.5–14.5)
GLUCOSE BLD MANUAL STRIP-MCNC: 178 MG/DL (ref 74–99)
GLUCOSE BLD MANUAL STRIP-MCNC: 64 MG/DL (ref 74–99)
GLUCOSE BLD MANUAL STRIP-MCNC: 72 MG/DL (ref 74–99)
GLUCOSE BLD MANUAL STRIP-MCNC: 75 MG/DL (ref 74–99)
GLUCOSE BLD MANUAL STRIP-MCNC: 81 MG/DL (ref 74–99)
GLUCOSE BLD MANUAL STRIP-MCNC: 87 MG/DL (ref 74–99)
GLUCOSE BLD MANUAL STRIP-MCNC: 94 MG/DL (ref 74–99)
GLUCOSE SERPL-MCNC: 87 MG/DL (ref 74–99)
HCT VFR BLD AUTO: 28.2 % (ref 36–46)
HGB BLD-MCNC: 8.3 G/DL (ref 12–16)
IMM GRANULOCYTES # BLD AUTO: 0.64 X10*3/UL (ref 0–0.7)
IMM GRANULOCYTES NFR BLD AUTO: 6.2 % (ref 0–0.9)
LYMPHOCYTES # BLD AUTO: 2.23 X10*3/UL (ref 1.2–4.8)
LYMPHOCYTES NFR BLD AUTO: 21.5 %
MAGNESIUM SERPL-MCNC: 2 MG/DL (ref 1.6–2.4)
MCH RBC QN AUTO: 28 PG (ref 26–34)
MCHC RBC AUTO-ENTMCNC: 29.4 G/DL (ref 32–36)
MCV RBC AUTO: 95 FL (ref 80–100)
MONOCYTES # BLD AUTO: 0.41 X10*3/UL (ref 0.1–1)
MONOCYTES NFR BLD AUTO: 4 %
NEUTROPHILS # BLD AUTO: 7.05 X10*3/UL (ref 1.2–7.7)
NEUTROPHILS NFR BLD AUTO: 67.9 %
NRBC BLD-RTO: 1.4 /100 WBCS (ref 0–0)
P AXIS: 15 DEGREES
P OFFSET: 198 MS
P ONSET: 153 MS
PLATELET # BLD AUTO: 74 X10*3/UL (ref 150–450)
POTASSIUM SERPL-SCNC: 4 MMOL/L (ref 3.5–5.3)
PR INTERVAL: 126 MS
PROT SERPL-MCNC: 6.3 G/DL (ref 6.4–8.2)
Q ONSET: 216 MS
QRS COUNT: 12 BEATS
QRS DURATION: 94 MS
QT INTERVAL: 400 MS
QTC CALCULATION(BAZETT): 440 MS
QTC FREDERICIA: 427 MS
R AXIS: -12 DEGREES
RBC # BLD AUTO: 2.96 X10*6/UL (ref 4–5.2)
RBC MORPH BLD: NORMAL
SODIUM SERPL-SCNC: 143 MMOL/L (ref 136–145)
T AXIS: 257 DEGREES
T OFFSET: 416 MS
VENTRICULAR RATE: 73 BPM
WBC # BLD AUTO: 10.4 X10*3/UL (ref 4.4–11.3)

## 2024-10-31 PROCEDURE — 92526 ORAL FUNCTION THERAPY: CPT | Mod: GN | Performed by: SPEECH-LANGUAGE PATHOLOGIST

## 2024-10-31 PROCEDURE — 82947 ASSAY GLUCOSE BLOOD QUANT: CPT

## 2024-10-31 PROCEDURE — 83735 ASSAY OF MAGNESIUM: CPT | Performed by: INTERNAL MEDICINE

## 2024-10-31 PROCEDURE — 2060000001 HC INTERMEDIATE ICU ROOM DAILY

## 2024-10-31 PROCEDURE — 99233 SBSQ HOSP IP/OBS HIGH 50: CPT | Performed by: INTERNAL MEDICINE

## 2024-10-31 PROCEDURE — 2500000002 HC RX 250 W HCPCS SELF ADMINISTERED DRUGS (ALT 637 FOR MEDICARE OP, ALT 636 FOR OP/ED): Performed by: INTERNAL MEDICINE

## 2024-10-31 PROCEDURE — 2500000004 HC RX 250 GENERAL PHARMACY W/ HCPCS (ALT 636 FOR OP/ED): Performed by: INTERNAL MEDICINE

## 2024-10-31 PROCEDURE — 85025 COMPLETE CBC W/AUTO DIFF WBC: CPT | Performed by: INTERNAL MEDICINE

## 2024-10-31 PROCEDURE — 94640 AIRWAY INHALATION TREATMENT: CPT

## 2024-10-31 PROCEDURE — 2500000005 HC RX 250 GENERAL PHARMACY W/O HCPCS: Performed by: INTERNAL MEDICINE

## 2024-10-31 PROCEDURE — 36415 COLL VENOUS BLD VENIPUNCTURE: CPT | Performed by: INTERNAL MEDICINE

## 2024-10-31 PROCEDURE — 80053 COMPREHEN METABOLIC PANEL: CPT | Performed by: INTERNAL MEDICINE

## 2024-10-31 RX ORDER — DEXTROSE 50 % IN WATER (D50W) INTRAVENOUS SYRINGE
25
Status: DISCONTINUED | OUTPATIENT
Start: 2024-10-31 | End: 2024-11-04

## 2024-10-31 RX ORDER — DEXTROSE 50 % IN WATER (D50W) INTRAVENOUS SYRINGE
12.5
Status: DISCONTINUED | OUTPATIENT
Start: 2024-10-31 | End: 2024-11-04

## 2024-10-31 RX ORDER — DEXTROSE MONOHYDRATE 50 MG/ML
75 INJECTION, SOLUTION INTRAVENOUS CONTINUOUS
Status: DISCONTINUED | OUTPATIENT
Start: 2024-10-31 | End: 2024-11-01

## 2024-10-31 ASSESSMENT — PAIN - FUNCTIONAL ASSESSMENT
PAIN_FUNCTIONAL_ASSESSMENT: 0-10
PAIN_FUNCTIONAL_ASSESSMENT: 0-10
PAIN_FUNCTIONAL_ASSESSMENT: PAINAD (PAIN ASSESSMENT IN ADVANCED DEMENTIA SCALE)

## 2024-10-31 ASSESSMENT — PAIN SCALES - PAIN ASSESSMENT IN ADVANCED DEMENTIA (PAINAD)
TOTALSCORE: REPOSITIONED
CONSOLABILITY: DISTRACTED OR REASSURED BY VOICE/TOUCH
BREATHING: NORMAL
NEGVOCALIZATION: OCCASIONAL MOAN/GROAN, LOW SPEECH, NEGATIVE/DISAPPROVING QUALITY
TOTALSCORE: 2
BODYLANGUAGE: RELAXED
FACIALEXPRESSION: SMILING OR INEXPRESSIVE

## 2024-10-31 ASSESSMENT — COGNITIVE AND FUNCTIONAL STATUS - GENERAL
STANDING UP FROM CHAIR USING ARMS: TOTAL
DAILY ACTIVITIY SCORE: 6
HELP NEEDED FOR BATHING: TOTAL
MOBILITY SCORE: 6
DRESSING REGULAR UPPER BODY CLOTHING: TOTAL
DRESSING REGULAR LOWER BODY CLOTHING: TOTAL
MOVING TO AND FROM BED TO CHAIR: TOTAL
TURNING FROM BACK TO SIDE WHILE IN FLAT BAD: TOTAL
EATING MEALS: TOTAL
MOVING FROM LYING ON BACK TO SITTING ON SIDE OF FLAT BED WITH BEDRAILS: TOTAL
WALKING IN HOSPITAL ROOM: TOTAL
TOILETING: TOTAL
PERSONAL GROOMING: TOTAL
CLIMB 3 TO 5 STEPS WITH RAILING: TOTAL

## 2024-10-31 ASSESSMENT — PAIN SCALES - GENERAL
PAINLEVEL_OUTOF10: 0 - NO PAIN
PAINLEVEL_OUTOF10: 0 - NO PAIN

## 2024-10-31 NOTE — CONSULTS
"   Endocrinology Initial Inpatient Consult Note     PATIENT NAME: Bing Holliday  MRN: [id number]  DATE: 10/31/2024    CONSULTING PHYSICIAN: Dr. TESSY Lambert.  REASON FOR CONSULT: AI. T2DM.      History of Presenting Illness     62y F w. 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  There was concerns for her nutrition status.  Nursing facility ultimately wanted her to be come to the hospital so that she could be considered for a feeding tube.  Apparently the patient has had multiple Corpak's in the past which have been removed by the patient.    It is unclear if the patient was receiving her steroids.    Patient with frequent admissions.        Review of Systems (Bold if Positive)     Unable to Obtain 2/2 Mental Status.      Physical Examination     /65   Pulse 92   Temp 37 °C (98.6 °F)   Resp 20   Ht 1.626 m (5' 4\")   Wt 81.2 kg (179 lb 0.2 oz)   LMP  (LMP Unknown)   SpO2 94%   BMI 30.73 kg/m²   General: Follows commands. A&O x 3.  Cachectic. Ill appearing.  HEENT: PERRLA. EOMi. Ø Exophthalmos.  Temporal wasting.  Neck: No LAD.  Thyroid: 20g Ø Bruit  CV: Normal rate and rhythm. No murmurs or rubs auscultated.   Resp: CTA b/l. No wheezing or crackles.   Abdomen: Generalized abdominal pain.  No rebound tenderness.  Extremities: Trace pedal edema b/l.  Ulnar deviation of the bilateral fingers.  Neuro: CN II-XII Grossly Intact.  Bilateral hand tremors. Brisk Reflexes.   Psych: Iwona affect.   Skin: No apparent rashes      Past Medical / Surgical / Family / Social " History     Past Medical History:   Diagnosis Date    Abnormal kidney function 04/04/2023    Acute headache 03/10/2024    Acute iritis of right eye 07/24/2015    Acute low back pain 10/30/2023    Acute upper respiratory infection, unspecified 03/04/2020    Acute URI    Acute upper respiratory infection, unspecified 09/30/2015    URTI (acute upper respiratory infection)    Arthralgia of right knee 02/14/2024    Arthritis     Atypical facial pain 03/10/2024    Body mass index (BMI) 23.0-23.9, adult 10/15/2021    BMI 23.0-23.9, adult    Body mass index (BMI) 33.0-33.9, adult 03/04/2020    BMI 33.0-33.9,adult    Cardiomegaly 08/27/2013    Left ventricular hypertrophy    Chest pain 06/10/2022    Comment on above: Added by Problem List Migration; 2013-3-12; Moved to Suppressed Nov 25 2013 9:16PM; Comment on above: Added by Problem List Migration; 2013-3-12; Moved to Suppressed Nov 25 2013 9:16PM;    Chronic kidney disease, stage 3 unspecified (Multi) 07/02/2013    Chronic kidney disease, stage III (moderate)    Confusional state 03/10/2024    Contact with and (suspected) exposure to covid-19 04/04/2023    Delirium 03/10/2024    Disease of pericardium, unspecified 07/02/2013    Pericardial disease    Encounter for follow-up examination after completed treatment for conditions other than malignant neoplasm 10/06/2022    Hospital discharge follow-up    Generalized contraction of visual field, right eye 01/29/2015    Generalized contraction of visual field of right eye    Hearing loss 03/10/2024    History of cataract 03/10/2024    History of thrombocytopenia 03/10/2024    Comment on above: Added by Problem List Migration; 2013-7-2;    Homonymous bilateral field defects, right side 04/29/2016    Homonymous bilateral field defects of right side    Hypertensive chronic kidney disease with stage 1 through stage 4 chronic kidney disease, or unspecified chronic kidney disease 07/02/2013    Nephrosclerosis    Laceration without  foreign body, left foot, initial encounter 07/03/2018    Foot laceration, left, initial encounter    Localized edema 03/10/2024    Localized, primary osteoarthritis 02/14/2024    Mass of skin 03/10/2024    Migraine with aura, not intractable, without status migrainosus 10/24/2022    Ocular migraine    Open wound 03/10/2024    Open wound of left foot 03/10/2024    Other conditions influencing health status 07/02/2013    Chronic Glomerulonephritis In Diseases Classified Elsewhere    Other conditions influencing health status 07/02/2013    Progressive Familial Myoclonic Epilepsy    Other conditions influencing health status 07/02/2013    Protein S Deficiency    Other conditions influencing health status 05/22/2015    Familial Combined Hyperlipidemia    Other conditions influencing health status 10/24/2022    IDDM (insulin dependent diabetes mellitus)    Other conditions influencing health status 03/14/2022    Diabetes mellitus, insulin dependent (IDDM), uncontrolled    Other long term (current) drug therapy 10/24/2022    Long-term use of Plaquenil    Overweight with body mass index (BMI) 25.0-29.9 03/10/2024    Pain of toe 03/10/2024    Personal history of COVID-19 04/04/2023    Personal history of diseases of the blood and blood-forming organs and certain disorders involving the immune mechanism 07/02/2013    History of thrombocytopenia    Personal history of diseases of the skin and subcutaneous tissue 08/11/2015    History of foot ulcer    Personal history of nephrotic syndrome 07/02/2013    History of nephrotic syndrome    Personal history of other diseases of the circulatory system 08/27/2013    History of sinus tachycardia    Personal history of other diseases of the nervous system and sense organs     History of cataract    Personal history of other diseases of the respiratory system     History of bronchitis    Personal history of other infectious and parasitic diseases 07/02/2013    History of hepatitis     Personal history of other specified conditions     History of shortness of breath    Personal history of other specified conditions 08/27/2013    History of edema    Posterior epistaxis 03/10/2024    Puckering of macula, right eye 10/24/2022    ERM OD (epiretinal membrane, right eye)    Raynaud's syndrome without gangrene 07/02/2013    Raynaud's disease    Sinusitis 03/10/2024    Systemic lupus erythematosus, unspecified 07/24/2015    SLE (systemic lupus erythematosus)    Systemic lupus erythematosus, unspecified 07/24/2015    SLE (systemic lupus erythematosus)    Systemic lupus erythematosus, unspecified 07/24/2015    Systemic lupus    Trigeminal nerve disorder 10/04/2024    Type 2 diabetes mellitus with diabetic nephropathy 07/02/2013    Type 2 diabetes with nephropathy    Type 2 diabetes mellitus with mild nonproliferative diabetic retinopathy without macular edema, left eye 07/27/2015    Non-proliferative diabetic retinopathy, left eye    Type 2 diabetes mellitus with mild nonproliferative diabetic retinopathy without macular edema, unspecified eye 07/24/2015    Mild non proliferative diabetic retinopathy    Type 2 diabetes mellitus with proliferative diabetic retinopathy without macular edema, right eye 07/27/2015    Proliferative diabetic retinopathy of right eye    Type 2 diabetes mellitus with proliferative diabetic retinopathy without macular edema, unspecified eye 07/24/2015    Diabetic proliferative retinopathy    Unspecified acute and subacute iridocyclitis 07/24/2015    Acute iritis, right eye    Unspecified open wound, left foot, sequela 07/03/2018    Wound, open, foot, left, sequela     Past Surgical History:   Procedure Laterality Date    ANKLE SURGERY  01/29/2015    Ankle Surgery    CARDIAC ELECTROPHYSIOLOGY PROCEDURE Left 5/17/2024    Procedure: PPM IMPLANT DUAL;  Surgeon: Antonio Rodriges MD;  Location: [facility];  Service: Electrophysiology;  Laterality: Left;  Pt. needs platelets and  Prednisone prior to procedure    CHOLECYSTECTOMY  01/29/2015    Cholecystectomy    CT GUIDED PERCUTANEOUS BIOPSY BONE DEEP  5/4/2021    CT GUIDED PERCUTANEOUS BIOPSY BONE DEEP 5/4/2021 [facility] CLINICAL LEGACY    EYE SURGERY  03/06/2015    Eye Surgery    FOOT SURGERY  01/29/2015    Foot Surgery    MR HEAD ANGIO WO IV CONTRAST  7/26/2013    MR HEAD ANGIO WO IV CONTRAST 7/26/2013 [facility] CLINICAL LEGACY    MR HEAD ANGIO WO IV CONTRAST  9/17/2021    MR HEAD ANGIO WO IV CONTRAST 9/17/2021 [facility]    MR HEAD ANGIO WO IV CONTRAST  3/25/2023    MR HEAD ANGIO WO IV CONTRAST [facility]    MR NECK ANGIO WO IV CONTRAST  7/26/2013    MR NECK ANGIO WO IV CONTRAST 7/26/2013 [facility] CLINICAL LEGACY    MR NECK ANGIO WO IV CONTRAST  9/17/2021    MR NECK ANGIO WO IV CONTRAST 9/17/2021 [facility]    MR NECK ANGIO WO IV CONTRAST  3/25/2023    MR NECK ANGIO WO IV CONTRAST [facility]    OTHER SURGICAL HISTORY  01/29/2015    Creation Of Pericardial Window    OTHER SURGICAL HISTORY  01/29/2015    Quadricepsplasty    TOTAL HIP ARTHROPLASTY  01/29/2015    Hip Replacement     Family History   Problem Relation Name Age of Onset    Other (RENAL DISEASE) Mother          END STAGE    Other (CARDIAC DISORDER) Mother      Cataracts Mother      Stroke Mother      Diabetes Mother      Kidney disease Mother      Lupus Mother      Other (CARDIAC DISORDER) Father      COPD Father      Glaucoma Father      Hypertension Father      Sleep apnea Father      Other (CARDIAC DISORDER) Sister      Depression Sister      Kidney disease Sister      Sickle cell trait Sister      Sleep apnea Daughter       Social History     Socioeconomic History    Marital status:      Spouse name: Not on file    Number of children: Not on file    Years of education: Not on file    Highest education level: Not on file   Occupational History    Not on file   Tobacco Use    Smoking status: Never     Passive exposure: Never    Smokeless tobacco: Never   Vaping Use     Vaping status: Never Used   Substance and Sexual Activity    Alcohol use: Not Currently     Comment: RARE    Drug use: Never    Sexual activity: Defer   Other Topics Concern    Not on file   Social History Narrative    Not on file     Social Drivers of Health     Financial Resource Strain: Low Risk  (10/30/2024)    Overall Financial Resource Strain (CARDIA)     Difficulty of Paying Living Expenses: Not hard at all   Food Insecurity: No Food Insecurity (10/30/2024)    Hunger Vital Sign     Worried About Running Out of Food in the Last Year: Never true     Ran Out of Food in the Last Year: Never true   Transportation Needs: No Transportation Needs (10/30/2024)    PRAPARE - Transportation     Lack of Transportation (Medical): No     Lack of Transportation (Non-Medical): No   Physical Activity: Inactive (7/15/2024)    Exercise Vital Sign     Days of Exercise per Week: 0 days     Minutes of Exercise per Session: 0 min   Stress: Patient Unable To Answer (7/15/2024)    [employer] - Occupational Stress Questionnaire     Feeling of Stress : Patient unable to answer   Social Connections: Patient Unable To Answer (7/15/2024)    Social Connection and Isolation Panel [NHANES]     Frequency of Communication with Friends and Family: Patient unable to answer     Frequency of Social Gatherings with Friends and Family: Patient unable to answer     Attends [employer] Services: Patient unable to answer     Active Member of Clubs or Organizations: Patient unable to answer     Attends Club or Organization Meetings: Patient unable to answer     Marital Status: Patient unable to answer   Intimate Partner Violence: Not At Risk (10/30/2024)    Humiliation, Afraid, Rape, and Kick questionnaire     Fear of Current or Ex-Partner: No     Emotionally Abused: No     Physically Abused: No     Sexually Abused: No   Housing Stability: Low Risk  (10/30/2024)    Housing Stability Vital Sign     Unable to Pay for Housing in  the Last Year: No     Number of Times Moved in the Last Year: 1     Homeless in the Last Year: No       Medications & Allergies     MAR and PTA Meds Reviewed     Allergies   Allergen Reactions    Ace Inhibitors Shortness of breath, Swelling, Other and Angioedema     Facial droop    Lisinopril Swelling    Hydroxychloroquine Other and Nausea/vomiting     Retinal Bleeding    Sulfa (Sulfonamide Antibiotics) Hives    Penicillins Other     tolerates cephalosporins-unknown childhood allergy  Tolerated ampicillin jan 2024 at [facility]; tolerates Pip/tazo and amox/clav       Data     Recent Labs and Imaging Reviewed      Assessment / Plan       # Adrenal Insufficiency  Well-known to me from previous admissions.    Patient frequently presents with hypothermia as well as hyponatremia and hypotension.  This is usually in the setting of not receiving steroids.  I did review her medical records from the nursing home which were included in her chart.  The patient does have hydrocortisone on her medication list.  This is dosed twice daily.  This is appropriate.  There is a handwritten note in the chart which is not signed by any specific person.  This states that the patient has not received meds for 72 hours.    I do think her presentation is secondary to not receiving steroids and adrenal crisis.    The patient is been appropriately started on stress dose steroids by the internal medicine team.  She is maintained on hydrocortisone intravenously 50 mg every 6 hours.  I think we can decrease this to 25 mg every 8 hours.    We need a durable way to give her medications.  There was concern in the past that the patient was pocketing medications.  There have been multiple goals of care discussions which have occurred with the patient's son from what I understand.    # Hypoglycemia  # Uncontrolled Type 2 Diabetes Mellitus  Per my many notes in the past.  Patient present with hypoglycemia.  This is likely in the setting of adrenal crisis.   Continue to check her sugars with meals and at bedtime's.  Given her multiple comorbidities my goal is to avoid hypoglycemia.    # Abnormal TFTs  I have reviewed the patient's TSH values for the last 5 years.  She has been more time in the hospital than outside of the hospital during this time.  She has multiple TSH values which I suspect have been ordered due to encephalopathy.  She does not have any TSH values greater than 10.  On admission it was in the 4 range.  I do think this is secondary to nonthyroidal illness.  Her free T4 is normal which is reassuring.  I recommend no intervention on her TSH value.    # Osteoporosis  In the setting of chronic glucocorticoid use. This will be further managed as an outpatient. She probably would benefit from a bisphosphonate, these agents are best studied with steroid-induced adynamic bone disease.       Avi Sloan, DO  Endocrinology, Diabetes, and Metabolism    Available via [employer] Messenger    Please excuse and typographical or unwanted errors within this documentation as voice recognition software was used to dictate this note.

## 2024-10-31 NOTE — PROGRESS NOTES
Bing Holliday is a 63 y.o. female on day 2 of admission presenting with Hypothermia, initial encounter.      Subjective   Patient is awake and responding today.  Does not report any symptoms or problems       Objective     Last Recorded Vitals  /65   Pulse 92   Temp 37 °C (98.6 °F)   Resp 20   Wt 81.2 kg (179 lb 0.2 oz)   SpO2 94%   Intake/Output last 3 Shifts:    Intake/Output Summary (Last 24 hours) at 10/31/2024 1011  Last data filed at 10/31/2024 0600  Gross per 24 hour   Intake 942.5 ml   Output 1355 ml   Net -412.5 ml       Admission Weight  Weight: 79.4 kg (175 lb) (10/29/24 1819)    Daily Weight  10/31/24 : 81.2 kg (179 lb 0.2 oz)      Physical Exam:  General: Awake and looking around on RA  HEENT: PERRLA, head intact and normocephalic.  Dry mucous membrane  Neck: Normal to inspection  Lungs: Clear to auscultation, work of breathing within normal limit  Cardiac: Regular rate and rhythm  Abdomen: Soft nontender, positive bowel sounds  : Exam deferred  Skin: Right heel wound noted.  Hematology: No petechia or excessive ecchymosis  Musculoskeletal: Without significant trauma  Neurological: Getting around and does not talk much but does respond to simple question  Psych: Unable to assess    Relevant Results  Scheduled medications  atorvastatin, 40 mg, oral, Nightly  budesonide, 0.5 mg, nebulization, BID  ertapenem, 1 g, intravenous, q24h  heparin (porcine), 5,000 Units, subcutaneous, q8h  [Held by provider] hydrocortisone, 10 mg, oral, q PM  [Held by provider] hydrocortisone, 20 mg, oral, q AM  hydrocortisone sodium succinate, 25 mg, intravenous, q8h  levETIRAcetam, 500 mg, intravenous, q12h  melatonin, 5 mg, oral, Nightly  [Held by provider] metoprolol tartrate, 25 mg, oral, BID  mycophenolate, 1,000 mg, oral, BID  pantoprazole, 40 mg, oral, Daily before breakfast   Or  pantoprazole, 40 mg, intravenous, Daily before breakfast  sertraline, 25 mg, oral, Daily      Continuous medications       PRN  medications  PRN medications: acetaminophen **OR** acetaminophen **OR** acetaminophen, acetaminophen **OR** acetaminophen **OR** acetaminophen, ipratropium-albuteroL, ondansetron ODT **OR** ondansetron   Labs  Results from last 7 days   Lab Units 10/31/24  0503 10/30/24  0408 10/29/24  1845   WBC AUTO x10*3/uL 10.4 8.8 5.7   HEMOGLOBIN g/dL 8.3* 9.2* 9.3*   HEMATOCRIT % 28.2* 31.7* 32.1*   PLATELETS AUTO x10*3/uL 74* 74* 73*     Results from last 7 days   Lab Units 10/31/24  0503 10/30/24  0408 10/29/24  1845   SODIUM mmol/L 143 144 146*   POTASSIUM mmol/L 4.0 4.1 4.5   CHLORIDE mmol/L 111* 111* 112*   CO2 mmol/L 20* 22 22   BUN mg/dL 21 21 24*   CREATININE mg/dL 1.94* 1.71* 1.81*   CALCIUM mg/dL 9.3 8.7 8.9   PROTEIN TOTAL g/dL 6.3* 6.2* 6.7   BILIRUBIN TOTAL mg/dL 0.5 0.7 0.6   ALK PHOS U/L 127 133 127   ALT U/L 20 23 26   AST U/L 20 29 45*   GLUCOSE mg/dL 87 123* 116*       XR chest 1 view    Result Date: 10/29/2024  Interpreted By:  Jaziel Montalvo, STUDY: XR CHEST 1 VIEW;  10/29/2024 6:35 pm   INDICATION: Signs/Symptoms:weight loss, failure to thrive.     COMPARISON: 09/30/2024   ACCESSION NUMBER(S): [id number]   ORDERING CLINICIAN: THU SALAZAR   FINDINGS: Cardiomegaly. The pulmonary vasculature is congested, increased from prior study. The pulmonary vasculature is somewhat indistinct as well.   No consolidation or pleural effusion or pneumothorax.   There is a left chest wall dual lead pacemaker with right atrial and right ventricular leads.         Slightly increased degree of vascular congestion that may relate to developing CHF exacerbation.   Moderate cardiomegaly.   MACRO: None.   Signed by: Jaziel Montalvo 10/29/2024 7:35 PM Dictation workstation:   [id number]    XR abdomen 1 view    Result Date: 10/13/2024  Interpreted By:  Eitan Ji, STUDY: XR ABDOMEN 1 VIEW;  10/13/2024 3:19 am   INDICATION: Signs/Symptoms:post corpak.   COMPARISON: Abdominal radiograph 10/12/2024   ACCESSION NUMBER(S):  [id number]   ORDERING CLINICIAN: KADI CABALLERO   FINDINGS: The enteric tube is positioned of the region of the mid to distal stomach. Nonobstructive bowel gas pattern. No gross intraperitoneal free air on limited supine imaging. Degenerative changes of the lumbar spine. Partially visualized right total hip arthroplasty.       Enteric tube projects over the region of the mid to distal stomach.     Signed by: Eitan Ji 10/13/2024 4:40 AM Dictation workstation:   [id number]    XR abdomen 1 view    Result Date: 10/13/2024  Interpreted By:  Eitan Ji, STUDY: XR ABDOMEN 1 VIEW;  10/12/2024 11:53 pm   INDICATION: Signs/Symptoms:evaluate positioning of NG tube.   COMPARISON: None.   ACCESSION NUMBER(S): [id number]   ORDERING CLINICIAN: THU SALAZAR   FINDINGS: AP view of the abdomen and pelvis.   An enteric tube is subdiaphragmatic in position projecting over the region of the distal stomach versus proximal duodenum. Nonobstructive bowel gas pattern. No gross intraperitoneal free air on limited supine imaging. Degenerative changes and scoliotic curvature of the lumbar spine. Partially visualized right total hip arthroplasty.         The enteric tube projects over the region of the distal stomach/proximal duodenum.     Signed by: Eitan Ji 10/13/2024 1:33 AM Dictation workstation:   [id number]    XR abdomen 1 view    Result Date: 10/4/2024  Interpreted By:  Roni Dwyer, STUDY: XR ABDOMEN 1 VIEW;  10/3/2024 6:41 pm   INDICATION: Signs/Symptoms:NG tube placement.   COMPARISON: None.   ACCESSION NUMBER(S): [id number]   ORDERING CLINICIAN: FREDO EVANS   FINDINGS: Feeding tube tip near the gastroduodenal junction. Nonobstructive bowel gas pattern.       Feeding tube tip near the gastroduodenal junction.   MACRO: None   Signed by: Roni Dwyer 10/4/2024 8:25 AM Dictation workstation:   [id number]    US renal complete    Result Date: 10/4/2024  Interpreted By:  Tani Navarro, STUDY: US RENAL  COMPLETE;  10/3/2024 9:00 pm   INDICATION: Signs/Symptoms:to assess for post renal JED.   COMPARISON: CT abdomen and pelvis 06/28/2024   ACCESSION NUMBER(S): [id number]   ORDERING CLINICIAN: ANA MONTERROSO   TECHNIQUE: Grayscale and color Doppler sonographic images of the kidneys.   FINDINGS: Evaluation limited due to bowel gas, limited acoustic windows, and difficulty with patient positioning.   RIGHT KIDNEY:  Measures 9.7 cm in length. No hydronephrosis. No calculus or perinephric fluid.   LEFT KIDNEY:  Measures 9.2 cm in length. No hydronephrosis. No calculus or perinephric fluid.   URINARY BLADDER: Partially distended. Normal.   Small volume free fluid in the pelvis.       Echogenic appearance of the renal parenchyma likely reflecting medical renal disease. No nephrolithiasis or hydronephrosis.   Small volume free fluid in the pelvis.   MACRO: None   Signed by: Tani Navarro 10/4/2024 1:12 AM Dictation workstation:   [id number]    ECG 12 lead    Result Date: 10/2/2024  Atrial fibrillation ST & T wave abnormality, consider anterolateral ischemia or digitalis effect Prolonged QT Abnormal ECG When compared with ECG of 30-SEP-2024 09:12, (unconfirmed) No significant change was found Confirmed by Mario Alberto Hopson ([id number]) on 10/2/2024 8:48:20 AM    XR abdomen 1 view    Result Date: 10/2/2024  Interpreted By:  Tani Navarro, STUDY: XR ABDOMEN 1 VIEW; ;  10/2/2024 6:11 am   INDICATION: Signs/Symptoms:OG repositioning.   COMPARISON: Chest/abdominal radiograph 10/01/2024   ACCESSION NUMBER(S): [id number]   ORDERING CLINICIAN: FREDO EVANS   FINDINGS: Enteric tube side port and tip project below the gastroesophageal junction, in the gastric body. Limited field of view of the abdomen is unremarkable with nonobstructive bowel-gas pattern.   Severe osteoarthritis of the left hip. Right hip arthroplasty.       Enteric tube side port and tip project below the gastroesophageal junction, in the gastric body.   MACRO:  None   Signed by: Tani Navarro 10/2/2024 7:00 AM Dictation workstation:   [id number]    XR abdomen 1 view    Result Date: 10/1/2024  Interpreted By:  Hay Leahy, STUDY: XR ABDOMEN 1 VIEW;  10/1/2024 9:50 pm   INDICATION: Signs/Symptoms:NG tube placement.     COMPARISON: 07/16/2024   ACCESSION NUMBER(S): [id number]   ORDERING CLINICIAN: FREDO EVANS   FINDINGS: There is a nasogastric tube noted ending in the upper abdomen in satisfactory position.       1.  There is a nasogastric tube noted ending in the upper abdomen in satisfactory position.   MACRO: None   Signed by: Hay Leahy 10/1/2024 10:15 PM Dictation workstation:   [id number]                   Assessment/Plan   Bing Holliday is a 63 y.o. female on day 2 of admission presenting with Hypothermia, initial encounter.  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    Assessment:  Hypothermia  Adrenal insufficiency  Lupus with complicated lupus cerebritis  Failure to thrive with poor oral intake  CKD stage III  Poor oral intake  HFpEF 40 to 45%  Sinus node dysfunction status post PPM  CAD  COPD  GERD  Thrombocytopenia  Recurrent Klebsiella ESBL UTI    Plan:  Hypokalemia has resolved  Continue with IV hydrocortisone  GI consult is noted and does not believe that patient is a good candidate for PEG tube given risk of her pulling and causing  peritonitis with her immunosuppressive treatment  Discussed with son who is POA wants to continue him current treatment and not ready for hospice  Continue hydrocortisone 50 every 6  Endocrinology consulted  Continue with Invanz  Multiple organisms seen on the urine culture  Blood culture is negative time 1 day  Will order for Corpak placement  Tube feed orders already placed  Appreciate speech and nutrition's input  Repeat labs for tomorrow ordered  Heparin for DVT prophylaxis       Plan discussed with patient and primary nurse at bedside.     High level of MDM based on above issue and discussing plan    This note is created using voice recognition software. All efforts are made to minimize errors, if there are errors there due to transcription.    Darek Felipe  Hospitalist

## 2024-10-31 NOTE — PROGRESS NOTES
Speech-Language Pathology    SLP Adult Inpatient  Speech-Language Pathology Treatment     Patient Name: Bing Holliday  MRN: [id number]  Today's Date: 10/31/2024  Time Calculation  Start Time: 1156  Stop Time: 1224  Time Calculation (min): 28 min         Current Problem:   1. Hypothermia, initial encounter  Warming blanket      2. Adrenal crisis (Multi)        3. Severe protein-calorie malnutrition (Multi)              SLP Assessment:  SLP TX Intervention Outcome: Making Progress Towards Goals  SLP Assessment Results: Other (Comment) (Swallowing Deficits)  Patient was seen this date for dysphagia therapy. Examination of oral mechanism was remarkable for lingual weakness and reduced ROM. Of note, patient was lethargic and had difficulty following commands. Patient tolerated trials of ice chips without overt s/s of aspiration. Patient attempted a cup sip which resulted in anterior spillage. Patient then accurately demonstrated a straw sip on the first try. Upon a second attempt, patient was unable to use a straw. Patient then attempted to eat apple sauce, which also resulted in anterior spillage. Oral suction was used to ensure clearance of puree. PO trials were discontinued after this trial.     Patient to remain NPO pending improved alertness. If patient is fully awake/alert, she may have ice chips AFTER ORAL CARE. ST to continue to follow during inpatient stay, and will continue to assess for diet advancement as alertness improves.    Prognosis: Fair  Treatment Tolerance: Patient limited by fatigue  Medical Staff Made Aware: Yes  Barriers: Comorbidities  Education Provided: Yes       Plan:  Inpatient/Swing Bed or Outpatient: Inpatient  Treatment/Interventions: Other (Comment) (Swallowing)  SLP TX Plan: Continue Plan of Care  SLP Plan: Skilled SLP  SLP Frequency: 2x per week  Duration: 2 weeks  SLP Discharge Recommendations: Continue skilled SLP services at the next level of care  Next Treatment Priority: Diet  tolerance  Discussed POC: Patient, Nursing  Discussed Risks/Benefits: Yes, Patient, Nursing  Patient/Caregiver Agreeable: Yes      Subjective   Patient was seen at bedside for dysphagia therapy. Patient disliked the taste of toothpaste, but was agreeable to cleaning her mouth with a swab.  Patient was lethargic and had varied alertness levels, however was able to participate in a few PO trials this date. Vocal intensity was initially aphonic, but improved after a few trial.     Most Recent Visit:  SLP Received On: 10/31/24    General Visit Information:   Reason for Referral: Rule out aspiration  Referred By: Darek Felipe MD  Past Medical History Relevant to Rehab: SLE, lupus cerebritis, RA, Raynaud's syndrome, hyperparathyroidism, adrenal insufficiency, COPD, DM, HTN, HLD, atrial fibrillation (not on anticoagulation), CKD, pacemaker, seizures, psychosis  Patient Seen During This Visit: Yes  Arrival: Independent  Caregiver Feedback: RN reports patient did not like receiving a physical assessment earlier this date.  Total Number of Visits : 1  Prior to Session Communication: Bedside nurse      Pain Assessment:  Pain Assessment  Pain Assessment: 0-10  0-10 (Numeric) Pain Score: 0 - No pain      Objective   Short Term Goals (established 10/30/2024):  1. Patient will tolerate PO with no overt s/s of aspiration in 90% of observed therapeutic trials.  PROGRESS:   - Patient tolerated ice chips and a straw sip of thin liquid without s/s of aspiration, however patient demonstrated anterior spillage with cup sip of thin liquids and puree solids.   2. Patient/caregiver will implement safe swallowing strategies to reduce risk of aspiration in 90% of trials given caregiver assistance/cueing as needed.  PROGRESS: Goal not targeted this date.       Therapeutic Swallow:  Therapeutic Swallow Intervention : PO Trials  Solid Diet Recommendations: NPO  Liquid Diet Recommendations: Ice chips after oral care, Other (Comments) (ONLY IF  FULLY AWAKE/ALERT)  Swallow Comments: STRICT oral care      Inpatient:    Education:  Learner patient   Barriers to Learning acuteness of illness barrier; cognitive limitations barrier; communication limitations barrier   Method verbal   Education - Topic ST provided patient education regarding role of ST, goals of treatment, and plan of care. Patient verbalized questionable comprehension, consistent with cognitive status. Education will be reinforced. ST further coordinated with RN regarding recommendations and precautions per this assessment, with RN verbalizing understanding.     Outcome    needs instruction; needs review/reinforcement; unable to meet goals/outcomes

## 2024-11-01 ENCOUNTER — APPOINTMENT (OUTPATIENT)
Dept: RADIOLOGY | Facility: HOSPITAL | Age: 64
End: 2024-11-01
Payer: MEDICARE

## 2024-11-01 LAB
ACANTHOCYTES BLD QL SMEAR: ABNORMAL
ALBUMIN SERPL BCP-MCNC: 3.1 G/DL (ref 3.4–5)
ALP SERPL-CCNC: 114 U/L (ref 33–136)
ALT SERPL W P-5'-P-CCNC: 17 U/L (ref 7–45)
ANION GAP SERPL CALC-SCNC: 16 MMOL/L (ref 10–20)
AST SERPL W P-5'-P-CCNC: 19 U/L (ref 9–39)
BASOPHILS # BLD MANUAL: 0 X10*3/UL (ref 0–0.1)
BASOPHILS NFR BLD MANUAL: 0 %
BILIRUB SERPL-MCNC: 0.5 MG/DL (ref 0–1.2)
BUN SERPL-MCNC: 21 MG/DL (ref 6–23)
CALCIUM SERPL-MCNC: 9.3 MG/DL (ref 8.6–10.3)
CHLORIDE SERPL-SCNC: 112 MMOL/L (ref 98–107)
CO2 SERPL-SCNC: 20 MMOL/L (ref 21–32)
CREAT SERPL-MCNC: 1.98 MG/DL (ref 0.5–1.05)
EGFRCR SERPLBLD CKD-EPI 2021: 28 ML/MIN/1.73M*2
EOSINOPHIL # BLD MANUAL: 0 X10*3/UL (ref 0–0.7)
EOSINOPHIL NFR BLD MANUAL: 0 %
ERYTHROCYTE [DISTWIDTH] IN BLOOD BY AUTOMATED COUNT: 22 % (ref 11.5–14.5)
GLUCOSE BLD MANUAL STRIP-MCNC: 100 MG/DL (ref 74–99)
GLUCOSE BLD MANUAL STRIP-MCNC: 106 MG/DL (ref 74–99)
GLUCOSE BLD MANUAL STRIP-MCNC: 119 MG/DL (ref 74–99)
GLUCOSE BLD MANUAL STRIP-MCNC: 158 MG/DL (ref 74–99)
GLUCOSE BLD MANUAL STRIP-MCNC: 95 MG/DL (ref 74–99)
GLUCOSE BLD MANUAL STRIP-MCNC: 96 MG/DL (ref 74–99)
GLUCOSE SERPL-MCNC: 107 MG/DL (ref 74–99)
HCT VFR BLD AUTO: 29.2 % (ref 36–46)
HGB BLD-MCNC: 8.4 G/DL (ref 12–16)
IMM GRANULOCYTES # BLD AUTO: 0.7 X10*3/UL (ref 0–0.7)
IMM GRANULOCYTES NFR BLD AUTO: 8.4 % (ref 0–0.9)
LYMPHOCYTES # BLD MANUAL: 1.94 X10*3/UL (ref 1.2–4.8)
LYMPHOCYTES NFR BLD MANUAL: 24 %
MAGNESIUM SERPL-MCNC: 2.06 MG/DL (ref 1.6–2.4)
MCH RBC QN AUTO: 28.6 PG (ref 26–34)
MCHC RBC AUTO-ENTMCNC: 28.8 G/DL (ref 32–36)
MCV RBC AUTO: 99 FL (ref 80–100)
MONOCYTES # BLD MANUAL: 0.24 X10*3/UL (ref 0.1–1)
MONOCYTES NFR BLD MANUAL: 3 %
NEUTROPHILS # BLD MANUAL: 5.91 X10*3/UL (ref 1.2–7.7)
NEUTS BAND # BLD MANUAL: 0.16 X10*3/UL (ref 0–0.7)
NEUTS BAND NFR BLD MANUAL: 2 %
NEUTS SEG # BLD MANUAL: 5.75 X10*3/UL (ref 1.2–7)
NEUTS SEG NFR BLD MANUAL: 71 %
NRBC BLD MANUAL-RTO: 3 % (ref 0–0)
NRBC BLD-RTO: 1 /100 WBCS (ref 0–0)
OVALOCYTES BLD QL SMEAR: ABNORMAL
PHOSPHATE SERPL-MCNC: 5 MG/DL (ref 2.5–4.9)
PLATELET # BLD AUTO: 62 X10*3/UL (ref 150–450)
POTASSIUM SERPL-SCNC: 3.6 MMOL/L (ref 3.5–5.3)
PROT SERPL-MCNC: 6.5 G/DL (ref 6.4–8.2)
RBC # BLD AUTO: 2.94 X10*6/UL (ref 4–5.2)
RBC MORPH BLD: ABNORMAL
SCHISTOCYTES BLD QL SMEAR: ABNORMAL
SODIUM SERPL-SCNC: 144 MMOL/L (ref 136–145)
TOTAL CELLS COUNTED BLD: 100
WBC # BLD AUTO: 8.1 X10*3/UL (ref 4.4–11.3)

## 2024-11-01 PROCEDURE — 74018 RADEX ABDOMEN 1 VIEW: CPT

## 2024-11-01 PROCEDURE — 94640 AIRWAY INHALATION TREATMENT: CPT

## 2024-11-01 PROCEDURE — 2500000004 HC RX 250 GENERAL PHARMACY W/ HCPCS (ALT 636 FOR OP/ED): Performed by: INTERNAL MEDICINE

## 2024-11-01 PROCEDURE — 82947 ASSAY GLUCOSE BLOOD QUANT: CPT

## 2024-11-01 PROCEDURE — 83735 ASSAY OF MAGNESIUM: CPT | Performed by: INTERNAL MEDICINE

## 2024-11-01 PROCEDURE — 85027 COMPLETE CBC AUTOMATED: CPT | Performed by: INTERNAL MEDICINE

## 2024-11-01 PROCEDURE — 2500000002 HC RX 250 W HCPCS SELF ADMINISTERED DRUGS (ALT 637 FOR MEDICARE OP, ALT 636 FOR OP/ED): Performed by: INTERNAL MEDICINE

## 2024-11-01 PROCEDURE — 99233 SBSQ HOSP IP/OBS HIGH 50: CPT | Performed by: STUDENT IN AN ORGANIZED HEALTH CARE EDUCATION/TRAINING PROGRAM

## 2024-11-01 PROCEDURE — 84100 ASSAY OF PHOSPHORUS: CPT | Performed by: INTERNAL MEDICINE

## 2024-11-01 PROCEDURE — 80053 COMPREHEN METABOLIC PANEL: CPT | Performed by: INTERNAL MEDICINE

## 2024-11-01 PROCEDURE — 74018 RADEX ABDOMEN 1 VIEW: CPT | Performed by: RADIOLOGY

## 2024-11-01 PROCEDURE — 2500000001 HC RX 250 WO HCPCS SELF ADMINISTERED DRUGS (ALT 637 FOR MEDICARE OP): Performed by: STUDENT IN AN ORGANIZED HEALTH CARE EDUCATION/TRAINING PROGRAM

## 2024-11-01 PROCEDURE — 2060000001 HC INTERMEDIATE ICU ROOM DAILY

## 2024-11-01 PROCEDURE — 36415 COLL VENOUS BLD VENIPUNCTURE: CPT | Performed by: INTERNAL MEDICINE

## 2024-11-01 PROCEDURE — 92526 ORAL FUNCTION THERAPY: CPT | Mod: GN | Performed by: SPEECH-LANGUAGE PATHOLOGIST

## 2024-11-01 PROCEDURE — 85007 BL SMEAR W/DIFF WBC COUNT: CPT | Performed by: INTERNAL MEDICINE

## 2024-11-01 PROCEDURE — 2500000004 HC RX 250 GENERAL PHARMACY W/ HCPCS (ALT 636 FOR OP/ED): Performed by: STUDENT IN AN ORGANIZED HEALTH CARE EDUCATION/TRAINING PROGRAM

## 2024-11-01 PROCEDURE — 2500000001 HC RX 250 WO HCPCS SELF ADMINISTERED DRUGS (ALT 637 FOR MEDICARE OP): Performed by: INTERNAL MEDICINE

## 2024-11-01 RX ORDER — LEVETIRACETAM 500 MG/1
500 TABLET ORAL 2 TIMES DAILY
Status: DISCONTINUED | OUTPATIENT
Start: 2024-11-01 | End: 2024-11-06 | Stop reason: HOSPADM

## 2024-11-01 ASSESSMENT — PAIN SCALES - GENERAL
PAINLEVEL_OUTOF10: 0 - NO PAIN
PAINLEVEL_OUTOF10: 0 - NO PAIN

## 2024-11-01 ASSESSMENT — PAIN SCALES - PAIN ASSESSMENT IN ADVANCED DEMENTIA (PAINAD)
BREATHING: NORMAL
FACIALEXPRESSION: SMILING OR INEXPRESSIVE
BREATHING: NORMAL
CONSOLABILITY: DISTRACTED OR REASSURED BY VOICE/TOUCH
FACIALEXPRESSION: SAD, FRIGHTENED, FROWN
NEGVOCALIZATION: REPEATED TROUBLED CALLING OUT, LOUD MOANING/GROANING, CRYING
CONSOLABILITY: UNABLE TO CONSOLE, DISTRACT OR REASSURE
TOTALSCORE: 2
NEGVOCALIZATION: OCCASIONAL MOAN/GROAN, LOW SPEECH, NEGATIVE/DISAPPROVING QUALITY
BODYLANGUAGE: TENSE, DISTRESSED PACING, FIDGETING
BODYLANGUAGE: RELAXED
TOTALSCORE: 6

## 2024-11-01 ASSESSMENT — PAIN - FUNCTIONAL ASSESSMENT
PAIN_FUNCTIONAL_ASSESSMENT: 0-10
PAIN_FUNCTIONAL_ASSESSMENT: PAINAD (PAIN ASSESSMENT IN ADVANCED DEMENTIA SCALE)
PAIN_FUNCTIONAL_ASSESSMENT: PAINAD (PAIN ASSESSMENT IN ADVANCED DEMENTIA SCALE)

## 2024-11-01 NOTE — PROGRESS NOTES
"    Endocrinology Inpatient Consult Progress Note     PATIENT NAME: Bing Holliday  MRN: [id number]  DATE: 11/1/2024    CONSULTING PHYSICIAN: Dr. CHIDI Felipe  REASON FOR CONSULT: Adrenal Insufficiency.      Interval Events     Corpak placed.  Tube feeds not infusing yet.  Patient has no complaints.  Seen by speech therapy.      Physical Examination     BP (!) 153/93 (BP Location: Right arm, Patient Position: Lying)   Pulse 62   Temp 35.4 °C (95.7 °F)   Resp 14   Ht 1.626 m (5' 4\")   Wt 81.2 kg (179 lb 0.2 oz)   LMP  (LMP Unknown)   SpO2 95%   BMI 30.73 kg/m²   No acute distress.  Appears chronically ill.  Sullen.  Cachectic.  Regular rate and rhythm.  Nonlabored respiration.  Soft nontender nondistended obese abdomen.  Trace pedal edema bilaterally.      Medications     Reviewed MAR       Data     Recent Labs and Imaging Reviewed      Assessment / Plan        # Secondary Adrenal Insufficiency: Presented with hypothermia and hypotension in the setting of not receiving glucocorticoids for what seems like 72 hours per note that was written in the chart but is unsigned.  The patient's encephalopathy as well as hypotension as well as hypothermia have improved on intravenous glucocorticoids.  We have decreased this to 25 mg every 8 hours.  I have reviewed the internal medicine note which states that the patient's son would like to continue current measures and not elect for palliative care.  Given this fact, I do think that we need to find a durable manner to give the patient her medications so as to prevent future adrenal crisis and even possibly death.  She has multiple admissions for adrenal crisis from not receiving her medications.    # T2DM: Continue lispro sliding scale for now.  The patient has a history of hypoglycemia which is not related to insulin.  Given her multiple comorbidities I would like to let her sugars be a little bit on the higher side.    # Abnormal TFT: Has been worked up in the past.  No TSH " greater than 10.  Her TSH on arrival was in the 4 range.  No indication for levothyroxine from my perspective.    # Osteoporosis: Definitely exacerbated by the fact that she keeps coming in with adrenal crisis and getting intravenous stress dose glucocorticoids.  Unfortunately think the patient has been seen in the outpatient as she spends most of her time in the hospital.  Perhaps we can consider giving her a dose of zoledronic acid.       Avi Sloan, DO  Endocrinology, Diabetes, and Metabolism    Available via [employer] Messenger    Please excuse any typographical or unwanted errors within this documentation as voice recognition software was used to dictate this note.

## 2024-11-01 NOTE — PROGRESS NOTES
INPATIENT PROGRESS NOTES    PATIENT NAME: Bing Holliday    MRN: [id number]  SERVICE DATE:  11/1/2024   SERVICE TIME:  3:23 PM    SIGNATURE: Jean Cason MD      SUBJECTIVE  Afebrile  No events overnight       OBJECTIVE  PHYSICAL EXAM:   Patient Vitals for the past 24 hrs:   BP Temp Temp src Pulse Resp SpO2 Weight   11/01/24 1200 (!) 142/94 35.5 °C (95.9 °F) Bladder 65 18 99 % --   11/01/24 0800 (!) 136/94 35.5 °C (95.9 °F) Bladder -- 22 99 % --   11/01/24 0445 (!) 153/93 35.9 °C (96.6 °F) Bladder 70 20 95 % --   11/01/24 0408 -- -- -- -- -- -- 82.1 kg (181 lb)   11/01/24 0400 -- 35.4 °C (95.7 °F) -- 62 14 94 % --   11/01/24 0000 130/81 35.8 °C (96.4 °F) Bladder 86 18 97 % --   10/31/24 2200 -- 36 °C (96.8 °F) -- 62 25 93 % --   10/31/24 2000 136/81 36.6 °C (97.9 °F) Temporal 74 17 96 % --   10/31/24 1800 -- 36.8 °C (98.2 °F) -- 78 -- 95 % --   10/31/24 1600 -- 37 °C (98.6 °F) -- 80 -- 92 % --         Gen: Chronically ill-appearing  Neck: symmetric, no mass  Cardiovascular: RRR  Respiratory: No distress     Labs:  Lab Results   Component Value Date    WBC 8.1 11/01/2024    HGB 8.4 (L) 11/01/2024    HCT 29.2 (L) 11/01/2024    MCV 99 11/01/2024    PLT 62 (L) 11/01/2024     Lab Results   Component Value Date    GLUCOSE 107 (H) 11/01/2024    CALCIUM 9.3 11/01/2024     11/01/2024    K 3.6 11/01/2024    CO2 20 (L) 11/01/2024     (H) 11/01/2024    BUN 21 11/01/2024    CREATININE 1.98 (H) 11/01/2024   ESR: --  Lab Results   Component Value Date    SEDRATE 2 07/22/2024     Lab Results   Component Value Date    CRP 2.57 (H) 07/22/2024     Lab Results   Component Value Date    ALT 17 11/01/2024    AST 19 11/01/2024    GGT 63 (H) 01/07/2021    ALKPHOS 114 11/01/2024    BILITOT 0.5 11/01/2024         DATA:   Diagnostic tests reviewed for today's visit:    Labs this admission reviewed  Imagings this admission reviewed  Cultures: Reviewed        ASSESSMENT :   -Recurrent UTI now with pyuria and bacteriuria  "concerning for UTI, history of Klebsiella ESBL in the urine culture  -Hx of SLE c/b cerebritis on MMF, recurrent adrenal insufficiency, CKD3, COPD, HFmREF (EF 40-45% 5/2024), GERD, afib (not on eliquis due to thrombocytopenia), bradycardia 2/2 sinus node dysfunction s/p pacemaker (5/17/2024), CAD s/p CABG 2013, hx of AAA, DM2      PLAN:  -Blood culture no growth   -Urine culture with multiple organisms  -Can discontinue antibiotics    Jean Cason MD.   Infectious Disease Attending            This note was partially created using voice recognition software and is inherently subject to errors including those of syntax and \"sound-alike\" substitutions which may escape proofreading. In such instances, original meaning may be extrapolated by contextual derivation       "

## 2024-11-01 NOTE — NURSING NOTE
THE PT HAS BEEN AWAKE, HALLUCINATINING; TALKING TO A MAN IN THE CORNER OF THE ROOM. THE PT IS AGITATED. ON ALERT. WITHDRAWS TO PAIN X4 EXT. DOES NOT CONSISTENTLY COOPERATE IN FOLLOWING COMMANDS. SPEAKS VERY CLEARLY WHEN SHE WANTS TO OR WILL CLOSE HER EYES AND IGNORE YOU. THE PT IS UNABLE TO STATE HER NAME, TIME, BDAY , MONTH. WILL SAY I DONT KNOW SOMETIMES.AT TIMES SHE IS CLENCHED UP AND CONTRACTED; STIFF; DIFFICULT TO MOVE. AT  OTHER TIMES LIKE WHEN SLEEPING APPEARS MORE RELAXED AND LESS TENSE. THAT RT HAND IS CONTRACTED BADLY. THE LT HAS MORE PLAY. THE PTS BS STARTED OUT AT 72-64 TONITE. DR ESCOBAR WAS MADE AWARE AND THE PT WAS GIVEN 12.5GM DEXTROSE X1 AND STARTED ON D5W @ 75ML/HR. HER RECOVERY BS . MD WAS NOTIFIED. THERE WERE NO CHANGES IN THE PTS DELERIUM OR LETHARGY. WE ARE WAITING ON ICU TO INSERT ANOTHER CORPAK. MD MADE AWARE WE MIGHT NEED A RESTRAINT ORDER IF SHE HAS PULLED OUT IN THE PAST AND HISTORICALLY RUNS LOW BS. THE PTS VSS; TEMP IS STABLE. THE PT LIKES TO TAKE OFF HER BED LINENS. HER FUNES IS DRAINING ADEQ AMTS OF HAZY YELLOW URINE. HER MUSCLE MASS IS VERY LOW AND THE PT APPEARS EMIATED. SHE HAS A SACRAL STAGE 2 DSG ON HER SACRUM WHICH THE DSG IS D+I; AND UN UNSTAGEABLE  AREA ON HER RT HEEL AND A MEPLEX DSG IS INPLACE D+I. SHE HAS POITUS BOOTS ON BLE AND SCDS. HER ABD IS SOFT TO PALPATE. + BS HYPOACTIVE. NO STOOLING PRESENTLY. HER HIPS AND THIGHS ARE HUGE IN SIZE. REQUIRING A HEAVY TO PERSON ASSIST TO TURN S-S Q 2HRS. HOB ELLEVATED @ 30 DEGREES. THE PTS BREATH SOUNDS HAVE INTERMITTENT RHOINCHI AND ARE DIMINISHED. . SHE HAS A MOIST NON PROD COUGH AT TIMES  AND REQUIRES ORAL SUCTIONING WHICH SHE RESISTS AS IN ORAL CARE AND REPOSITIONING.   HER EKG IS NSR WITH OCC PVCS. HER BP IS WNL. O2 SATS 93-95% CONSISTENTLY ON RA.SEIZURE PADS WERE INSTITUTED AND CONTACT PLUS PRECAUTIONS FOR KLEIBSELLA IN HER URINE. THE PT HAS PROLONGED  PERIODS OF WAKEFULNESS OR SLEEP. NO DISTRESS IS OBSERVED AT THIS  TIME.  0040- DR ESCOBAR WAS UPDATED ON THE PTS BS, NOW CONSISTENTLY 96 RANAGE WITH IVF. AWARE THE CORPAK STILL HAS NOT BEEN PLACED. THE PTS HYDROCORTISONE MED. WERE RECLARIFIED. GIVING IV SINCE SHE ISNPO UNTIL SHE CAN TAKE PER TF.NO CHANGES NOTED IN THE PTS DELERIUM. NO FAMILY CONTACT NOTED THIS SHIFT. NO SEIZURE ACTIVITY OBSERVED. THE PT CONTINUES TO BE TURNED Q 2 HRS AND PRN. STILL NO STOOLINMG YET. ADEQ U/O NOTED.  0500-  SPOKE WITH DR ESCOBAR ABOUT THE CORPAK BEING UNABLED TO BE PLACED TONITE. ORDERS RECEIVED TO RETRY AGAIN WITH ICU IN THE AM OR SEE IF IR CAN PLACE. THE PTS BS REMAINS 95 THIS AM. NO CHANGES NBOTED IN THE PTS SENSORIUM OR MENTATION. STILL ONLY TALKING WHEN SHE WANTS RAJAN, NOT FOLLOWING COMMANDS, RESISTANCES TO CARE GIVING  AND TURNING AT TIMES. THE PT WAS BATHED , HER SKIN WAS LOTIONED AND THE LINENS WERE CHANGED. THE PT IS RESTING QUIETLY AT THIS TIME.VSS. PT SAFETY HAS BEEN MAINTAINED.  6740- DR DELGADO , ENDOCRINE, IS IN AT THE PTS BS . UPDATED.

## 2024-11-01 NOTE — PROGRESS NOTES
Bing Holliday is a 63 y.o. female on day 3 of admission presenting with Hypothermia, initial encounter.      Subjective   Patient seen following placement of Corpak. She is quite lethargic, but opens her eyes to verbal stimuli. Does not attempt to engage in the conversation this AM.        Objective     Last Recorded Vitals  BP (!) 153/93 (BP Location: Right arm, Patient Position: Lying)   Pulse 62   Temp 35.4 °C (95.7 °F)   Resp 14   Wt 82.1 kg (181 lb)   SpO2 95%   Intake/Output last 3 Shifts:    Intake/Output Summary (Last 24 hours) at 11/1/2024 1019  Last data filed at 11/1/2024 0701  Gross per 24 hour   Intake 960 ml   Output 510 ml   Net 450 ml       Admission Weight  Weight: 79.4 kg (175 lb) (10/29/24 1819)    Daily Weight  11/01/24 : 82.1 kg (181 lb)    Physical exam:  Gen: lethargic, no acute distress  HEENT: MMM, EOMI, no scleral icterus  Neck: supple, no LAD  CV: irregularly irregular, no murmurs   Lungs: good air entry b/l, no rhonchi/wheezes appreciated, coughing throughout exam  Abd: soft, nontender, nondistended, normoactive BS, corpak in place  Ext: WWP, no edema  Skin: no rashes/lesions  Neuro: opens eyes to verbal stimuli    Results for orders placed or performed during the hospital encounter of 10/29/24 (from the past 24 hours)   POCT GLUCOSE   Result Value Ref Range    POCT Glucose 87 74 - 99 mg/dL   POCT GLUCOSE   Result Value Ref Range    POCT Glucose 75 74 - 99 mg/dL   POCT GLUCOSE   Result Value Ref Range    POCT Glucose 72 (L) 74 - 99 mg/dL   POCT GLUCOSE   Result Value Ref Range    POCT Glucose 64 (L) 74 - 99 mg/dL   POCT GLUCOSE   Result Value Ref Range    POCT Glucose 178 (H) 74 - 99 mg/dL   POCT GLUCOSE   Result Value Ref Range    POCT Glucose 96 74 - 99 mg/dL   POCT GLUCOSE   Result Value Ref Range    POCT Glucose 95 74 - 99 mg/dL   CBC and Auto Differential   Result Value Ref Range    WBC 8.1 4.4 - 11.3 x10*3/uL    nRBC 1.0 (H) 0.0 - 0.0 /100 WBCs    RBC 2.94 (L) 4.00 - 5.20  x10*6/uL    Hemoglobin 8.4 (L) 12.0 - 16.0 g/dL    Hematocrit 29.2 (L) 36.0 - 46.0 %    MCV 99 80 - 100 fL    MCH 28.6 26.0 - 34.0 pg    MCHC 28.8 (L) 32.0 - 36.0 g/dL    RDW 22.0 (H) 11.5 - 14.5 %    Platelets 62 (L) 150 - 450 x10*3/uL    Immature Granulocytes %, Automated 8.4 (H) 0.0 - 0.9 %    Immature Granulocytes Absolute, Automated 0.70 0.00 - 0.70 x10*3/uL   Comprehensive Metabolic Panel   Result Value Ref Range    Glucose 107 (H) 74 - 99 mg/dL    Sodium 144 136 - 145 mmol/L    Potassium 3.6 3.5 - 5.3 mmol/L    Chloride 112 (H) 98 - 107 mmol/L    Bicarbonate 20 (L) 21 - 32 mmol/L    Anion Gap 16 10 - 20 mmol/L    Urea Nitrogen 21 6 - 23 mg/dL    Creatinine 1.98 (H) 0.50 - 1.05 mg/dL    eGFR 28 (L) >60 mL/min/1.73m*2    Calcium 9.3 8.6 - 10.3 mg/dL    Albumin 3.1 (L) 3.4 - 5.0 g/dL    Alkaline Phosphatase 114 33 - 136 U/L    Total Protein 6.5 6.4 - 8.2 g/dL    AST 19 9 - 39 U/L    Bilirubin, Total 0.5 0.0 - 1.2 mg/dL    ALT 17 7 - 45 U/L   Phosphorus   Result Value Ref Range    Phosphorus 5.0 (H) 2.5 - 4.9 mg/dL   Magnesium   Result Value Ref Range    Magnesium 2.06 1.60 - 2.40 mg/dL   Manual Differential   Result Value Ref Range    Neutrophils %, Manual 71.0 40.0 - 80.0 %    Bands %, Manual 2.0 0.0 - 5.0 %    Lymphocytes %, Manual 24.0 13.0 - 44.0 %    Monocytes %, Manual 3.0 2.0 - 10.0 %    Eosinophils %, Manual 0.0 0.0 - 6.0 %    Basophils %, Manual 0.0 0.0 - 2.0 %    Seg Neutrophils Absolute, Manual 5.75 1.20 - 7.00 x10*3/uL    Bands Absolute, Manual 0.16 0.00 - 0.70 x10*3/uL    Lymphocytes Absolute, Manual 1.94 1.20 - 4.80 x10*3/uL    Monocytes Absolute, Manual 0.24 0.10 - 1.00 x10*3/uL    Eosinophils Absolute, Manual 0.00 0.00 - 0.70 x10*3/uL    Basophils Absolute, Manual 0.00 0.00 - 0.10 x10*3/uL    Total Cells Counted 100     Neutrophils Absolute, Manual 5.91 1.20 - 7.70 x10*3/uL    Manual nRBC per 100 Cells 3.0 (H) 0.0 - 0.0 %    RBC Morphology See Below     RBC Fragments Few     Ovalocytes Few      Acanthocytes Few    POCT GLUCOSE   Result Value Ref Range    POCT Glucose 100 (H) 74 - 99 mg/dL     *Note: Due to a large number of results and/or encounters for the requested time period, some results have not been displayed. A complete set of results can be found in Results Review.       Image Results  XR abdomen 1 view  Narrative: Interpreted By:  Roni Dwyer,   STUDY:  XR ABDOMEN 1 VIEW;  11/1/2024 8:23 am      INDICATION:  Signs/Symptoms:corpak placement.      COMPARISON:  None.      ACCESSION NUMBER(S):  [id number]      ORDERING CLINICIAN:  YULIANA BARRETO      FINDINGS:  Feeding tube tip in the distal stomach. Cardiomegaly.      Impression: Feeding tube tip in the distal stomach.      MACRO:  None      Signed by: Roni Dwyer 11/1/2024 8:28 AM  Dictation workstation:   [id number]    Scheduled medications  atorvastatin, 40 mg, oral, Nightly  budesonide, 0.5 mg, nebulization, BID  ertapenem, 1 g, intravenous, q24h  heparin (porcine), 5,000 Units, subcutaneous, q8h  [Held by provider] hydrocortisone, 10 mg, oral, q PM  [Held by provider] hydrocortisone, 20 mg, oral, q AM  hydrocortisone sodium succinate, 50 mg, intravenous, q8h  levETIRAcetam, 500 mg, intravenous, q12h  melatonin, 5 mg, oral, Nightly  [Held by provider] metoprolol tartrate, 25 mg, oral, BID  mycophenolate, 1,000 mg, oral, BID  pantoprazole, 40 mg, oral, Daily before breakfast   Or  pantoprazole, 40 mg, intravenous, Daily before breakfast  sertraline, 25 mg, oral, Daily      Continuous medications  dextrose 5%, 75 mL/hr, Last Rate: 75 mL/hr (10/31/24 8160)      PRN medications  PRN medications: acetaminophen **OR** acetaminophen **OR** acetaminophen, acetaminophen **OR** acetaminophen **OR** acetaminophen, dextrose, dextrose, glucagon, glucagon, ipratropium-albuteroL, ondansetron ODT **OR** ondansetron      Assessment/Plan          This patient has a urinary catheter   Reason for the urinary catheter remaining today? urinary retention/bladder  outlet obstruction, acute or chronic       Ms Mg is a 64yo woman with SLE with multiple complications, adrenal insufficiency c/b multiple hospitalizations for adrenal crisis, CKDIII, HRmrEF, CVA, COPD, afib not on AC d/t pancytopenia, dysphagia admitted for hypotension in setting of inability to take steroids at SNF. She was started on stress dose steroids in ER, which has been weaned to 25mg TID. GI consulted for PEG placement in the setting of enteral support/med needs, however she is not a candidate for PEG due to comorbidities. Son is not interested in hospice at this time. Corpak replaced. Will increase steroids today given encephalopathy.     #SLE  #adrenal insufficiency  #?UTI   #encephalopathy  -historically, patient has required a slow taper of steroids as she quickly develops encephalopathy. Will increase steroids to hydrocortisone 50mg TID today, and reassess response  -Home dose is 20mg qAM, 10mg qPM  -corpak placed for med administration  -ID following for possible UTI with recent ESBL Klebsiella requiring ertapemen   -no longer requires jacqueline hugger  -continue cellcept     #dysphagia  -historically due to encephaloapthy, waxes and wanes  -speech following  -corpak placed this AM  -continue TF  -will switch meds to oral    #HFmrEF  #COPD  #DMII  #CKD  #seizure d/o   -continue ICS, duonebs  -continue statin   -continue keppra     FEN/GI: TF, speech following  Access: PIV  Ppx: GERALD  Code: DNAR/DNI - patient's family not interested in hospice    Dispo: back to SNF pending final steroid dosing, abx regimen. Hopefully over the weekend         Malnutrition Diagnosis Status: New  Malnutrition Diagnosis: Severe malnutrition related to chronic disease or condition  As Evidenced by: recurrent adrenal crisis with inconsistent oral intakes >1 month resulting in 6% weight loss x1 month, 15% x3 months and 12% x6 months and severe fat and muscle mass wasting (Pt EN dependent 10/3-approximately 10/26/2024 when pt  reportedly pulled out corpak.)  I agree with the dietitian's malnutrition diagnosis.         Yolanda Moreno MD

## 2024-11-01 NOTE — PROGRESS NOTES
Speech-Language Pathology    SLP Adult Inpatient Treatment     Patient Name: Bing Holliday  MRN: [id number]  Today's Date: 11/1/2024  Time Calculation  Start Time: 1847  Stop Time: 1858  Time Calculation (min): 11 min         Current Problem:   1. Hypothermia, initial encounter  Warming blanket      2. Adrenal crisis (Multi)        3. Severe protein-calorie malnutrition (Multi)          Recommendations:  Solid Diet Recommendations: NPO  Liquid Diet Recommendations: Ice chips after oral care (ONLY IF FULLY AWAKE/ALERT)  Swallow Comments: STRICT oral care    SLP Assessment:  SLP TX Intervention Outcome: Making Progress Towards Goals  SLP Assessment Results: Swallowing Deficits  Patient was seen this date for dysphagia therapy. Patient unable to understand directives to clear throat/cough (versus could not execute same). Given minimal to moderate verbal/tactile cues, patient able to tolerate upright position bedside, participate in oral care given a swab and engage in limited conversation. Brushing teeth was not an option due to patient's waxing/waning alertness levels. During periods of decreased alertness, patient leaned toward the L, requiring repositioning to neutral.   Because patient's ability to tolerate session was limited due to extent of waxing and waning alertness levels, only one bolus (ice chip) trial could be presented. Patient able to propel bolus adequately A-P and initiate a pharyngeal swallow. Patient with no S/S dysphagia.   Patient to remain NPO pending improved alertness. If patient is fully awake/alert, she may have ice chips AFTER ORAL CARE. ST to continue to follow during inpatient stay, and will continue to assess for diet advancement as alertness improves.  Prognosis: Fair  Treatment Tolerance: Patient limited by fatigue  Medical Staff Made Aware: Yes  Barriers: Comorbidities  Education Provided: Yes     Plan:  Inpatient/Swing Bed or Outpatient: Inpatient  Treatment/Interventions:  Swallowing  SLP TX Plan: Continue Plan of Care  SLP Plan: Skilled SLP  SLP Frequency: 2x per week  Duration: 2 weeks  SLP Discharge Recommendations: Continue skilled SLP services at the next level of care  Next Treatment Priority: Diet tolerance  Discussed POC: Patient, Nursing  Discussed Risks/Benefits: Yes, Patient, Nursing  Patient/Caregiver Agreeable: Yes     Subjective  Patient was seen at bedside for dysphagia therapy, and assisted into an upright position. Patient with waxing/waning alertness levels, however, patient was able to alert adequately for limited participation in therapy. Vocal intensity was significantly decreased, with patient achieving voicing on x4 single word level utterances throughout session. Vocal quality was wet/gurgly.     Most Recent Visit:  SLP Received On: 10/31/24     General Visit Information:  Reason for Referral: Rule out aspiration  Referred By: Darek Felipe MD  Past Medical History Relevant to Rehab: SLE, lupus cerebritis, RA, Raynaud's syndrome, hyperparathyroidism, adrenal insufficiency, COPD, DM, HTN, HLD, atrial fibrillation (not on anticoagulation), CKD, pacemaker, seizures, psychosis  Patient Seen During This Visit: Yes  Caregiver Feedback: RN reports patient has been lethargic this date.    Total Number of Visits: 3  Prior to Session Communication: Bedside nurse     Pain Assessment:  Pain Assessment: 0-10  0-10 (Numeric) Pain Score: 0 - No pain     Objective  Short Term Goals (established 10/30/2024):  1. Patient will tolerate PO with no overt s/s of aspiration in 90% of observed therapeutic trials.  Progressing. Because patient's ability to tolerate session was limited due to extent of waxing and waning alertness levels, only one bolus (ice chip) trial could be presented. Patient able to propel bolus adequately A-P and initiate a pharyngeal swallow without cues. Patient with no S/S dysphagia.   2. Patient/caregiver will implement safe swallowing strategies to reduce risk  of aspiration in 90% of trials given caregiver assistance/cueing as needed.  Progressing. Because of waxing/waning alertness levels, patient with a tendency to lean toward the L, requiring repositioning to neutral. Patient able to tolerate upright position in order to participate in oral care given a swab, and be presented with one PO trial. Further trials of PO could not be provided 2/2 waxing/waning alertness levels.     Therapeutic Swallow:  Therapeutic Swallow Intervention: PO Trial; Patient/Caregiver Education      Inpatient  Education:  Learner patient  Barriers to Learning acuteness of illness barrier; cognitive limitations barrier; communication limitations barrier  Method verbal  Education - Topic ST provided patient education regarding role of ST, goals of treatment, and plan of care. Patient verbalized questionable comprehension, consistent with cognitive status. Education will be reinforced. ST further coordinated with RN regarding recommendations and precautions per this assessment, with RN verbalizing understanding.     Outcome needs instruction; needs review/reinforcement; unable to meet goals/outcomes

## 2024-11-01 NOTE — CARE PLAN
The patient's goals for the shift include      The clinical goals for the shift include THE PT WILL BE HDS,  MAINTAIN A BS  BS > 80 < 150; RETAIN A CORPAK AFTER INSERTION AND TOLERATE TUIBE FEED RESUMPTION; PROMOTE OPTIMIUM NUTRITION AND DEVELOP NO NEW SIGNS OF INFECTION / AND WOUND HEALING BY THE END OF THIS SHIFT.    OProblem: Chronic Conditions and Co-morbidities  Goal: Patient's chronic conditions and co-morbidity symptoms are monitored and maintained or improved  Outcome: Not Progressing

## 2024-11-02 ENCOUNTER — APPOINTMENT (OUTPATIENT)
Dept: RADIOLOGY | Facility: HOSPITAL | Age: 64
End: 2024-11-02
Payer: MEDICARE

## 2024-11-02 LAB
ALBUMIN SERPL BCP-MCNC: 2.8 G/DL (ref 3.4–5)
ANION GAP SERPL CALC-SCNC: 14 MMOL/L (ref 10–20)
BUN SERPL-MCNC: 21 MG/DL (ref 6–23)
CALCIUM SERPL-MCNC: 8.8 MG/DL (ref 8.6–10.3)
CHLORIDE SERPL-SCNC: 110 MMOL/L (ref 98–107)
CO2 SERPL-SCNC: 23 MMOL/L (ref 21–32)
CORTIS F SERPL-MCNC: 0.49 UG/DL
CREAT SERPL-MCNC: 1.76 MG/DL (ref 0.5–1.05)
EGFRCR SERPLBLD CKD-EPI 2021: 32 ML/MIN/1.73M*2
ERYTHROCYTE [DISTWIDTH] IN BLOOD BY AUTOMATED COUNT: 21.4 % (ref 11.5–14.5)
GLUCOSE BLD MANUAL STRIP-MCNC: 149 MG/DL (ref 74–99)
GLUCOSE BLD MANUAL STRIP-MCNC: 188 MG/DL (ref 74–99)
GLUCOSE BLD MANUAL STRIP-MCNC: 193 MG/DL (ref 74–99)
GLUCOSE BLD MANUAL STRIP-MCNC: 209 MG/DL (ref 74–99)
GLUCOSE BLD MANUAL STRIP-MCNC: 212 MG/DL (ref 74–99)
GLUCOSE BLD MANUAL STRIP-MCNC: 229 MG/DL (ref 74–99)
GLUCOSE SERPL-MCNC: 187 MG/DL (ref 74–99)
HCT VFR BLD AUTO: 27.8 % (ref 36–46)
HGB BLD-MCNC: 8.3 G/DL (ref 12–16)
MAGNESIUM SERPL-MCNC: 1.91 MG/DL (ref 1.6–2.4)
MCH RBC QN AUTO: 29 PG (ref 26–34)
MCHC RBC AUTO-ENTMCNC: 29.9 G/DL (ref 32–36)
MCV RBC AUTO: 97 FL (ref 80–100)
NRBC BLD-RTO: 1.6 /100 WBCS (ref 0–0)
PHOSPHATE SERPL-MCNC: 4.8 MG/DL (ref 2.5–4.9)
PLATELET # BLD AUTO: 63 X10*3/UL (ref 150–450)
POTASSIUM SERPL-SCNC: 3.5 MMOL/L (ref 3.5–5.3)
RBC # BLD AUTO: 2.86 X10*6/UL (ref 4–5.2)
SODIUM SERPL-SCNC: 143 MMOL/L (ref 136–145)
WBC # BLD AUTO: 9.3 X10*3/UL (ref 4.4–11.3)

## 2024-11-02 PROCEDURE — 2500000004 HC RX 250 GENERAL PHARMACY W/ HCPCS (ALT 636 FOR OP/ED): Performed by: STUDENT IN AN ORGANIZED HEALTH CARE EDUCATION/TRAINING PROGRAM

## 2024-11-02 PROCEDURE — 2500000002 HC RX 250 W HCPCS SELF ADMINISTERED DRUGS (ALT 637 FOR MEDICARE OP, ALT 636 FOR OP/ED): Performed by: INTERNAL MEDICINE

## 2024-11-02 PROCEDURE — 2500000001 HC RX 250 WO HCPCS SELF ADMINISTERED DRUGS (ALT 637 FOR MEDICARE OP): Performed by: STUDENT IN AN ORGANIZED HEALTH CARE EDUCATION/TRAINING PROGRAM

## 2024-11-02 PROCEDURE — 83735 ASSAY OF MAGNESIUM: CPT | Performed by: STUDENT IN AN ORGANIZED HEALTH CARE EDUCATION/TRAINING PROGRAM

## 2024-11-02 PROCEDURE — 94640 AIRWAY INHALATION TREATMENT: CPT

## 2024-11-02 PROCEDURE — 2060000001 HC INTERMEDIATE ICU ROOM DAILY

## 2024-11-02 PROCEDURE — 36415 COLL VENOUS BLD VENIPUNCTURE: CPT | Performed by: STUDENT IN AN ORGANIZED HEALTH CARE EDUCATION/TRAINING PROGRAM

## 2024-11-02 PROCEDURE — 80069 RENAL FUNCTION PANEL: CPT | Performed by: STUDENT IN AN ORGANIZED HEALTH CARE EDUCATION/TRAINING PROGRAM

## 2024-11-02 PROCEDURE — 85027 COMPLETE CBC AUTOMATED: CPT | Performed by: STUDENT IN AN ORGANIZED HEALTH CARE EDUCATION/TRAINING PROGRAM

## 2024-11-02 PROCEDURE — 99233 SBSQ HOSP IP/OBS HIGH 50: CPT | Performed by: STUDENT IN AN ORGANIZED HEALTH CARE EDUCATION/TRAINING PROGRAM

## 2024-11-02 PROCEDURE — 2500000004 HC RX 250 GENERAL PHARMACY W/ HCPCS (ALT 636 FOR OP/ED): Performed by: INTERNAL MEDICINE

## 2024-11-02 PROCEDURE — 71045 X-RAY EXAM CHEST 1 VIEW: CPT | Performed by: RADIOLOGY

## 2024-11-02 PROCEDURE — 71045 X-RAY EXAM CHEST 1 VIEW: CPT

## 2024-11-02 PROCEDURE — 82947 ASSAY GLUCOSE BLOOD QUANT: CPT

## 2024-11-02 PROCEDURE — 87040 BLOOD CULTURE FOR BACTERIA: CPT | Mod: STJLAB | Performed by: STUDENT IN AN ORGANIZED HEALTH CARE EDUCATION/TRAINING PROGRAM

## 2024-11-02 PROCEDURE — 2500000001 HC RX 250 WO HCPCS SELF ADMINISTERED DRUGS (ALT 637 FOR MEDICARE OP): Performed by: INTERNAL MEDICINE

## 2024-11-02 RX ORDER — ACETAMINOPHEN 325 MG/1
650 TABLET ORAL EVERY 4 HOURS PRN
Status: DISCONTINUED | OUTPATIENT
Start: 2024-11-02 | End: 2024-11-02

## 2024-11-02 RX ORDER — ACETAMINOPHEN 325 MG/1
650 TABLET ORAL EVERY 4 HOURS PRN
Status: DISCONTINUED | OUTPATIENT
Start: 2024-11-02 | End: 2024-11-06 | Stop reason: HOSPADM

## 2024-11-02 RX ORDER — METOPROLOL TARTRATE 25 MG/1
25 TABLET, FILM COATED ORAL 2 TIMES DAILY
Status: DISCONTINUED | OUTPATIENT
Start: 2024-11-02 | End: 2024-11-06 | Stop reason: HOSPADM

## 2024-11-02 ASSESSMENT — COGNITIVE AND FUNCTIONAL STATUS - GENERAL
DAILY ACTIVITIY SCORE: 6
MOBILITY SCORE: 6
STANDING UP FROM CHAIR USING ARMS: TOTAL
MOBILITY SCORE: 6
HELP NEEDED FOR BATHING: TOTAL
CLIMB 3 TO 5 STEPS WITH RAILING: TOTAL
TURNING FROM BACK TO SIDE WHILE IN FLAT BAD: TOTAL
WALKING IN HOSPITAL ROOM: TOTAL
MOVING FROM LYING ON BACK TO SITTING ON SIDE OF FLAT BED WITH BEDRAILS: TOTAL
DRESSING REGULAR LOWER BODY CLOTHING: TOTAL
MOVING FROM LYING ON BACK TO SITTING ON SIDE OF FLAT BED WITH BEDRAILS: TOTAL
TOILETING: TOTAL
TURNING FROM BACK TO SIDE WHILE IN FLAT BAD: TOTAL
TOILETING: TOTAL
MOVING TO AND FROM BED TO CHAIR: TOTAL
DRESSING REGULAR UPPER BODY CLOTHING: TOTAL
EATING MEALS: TOTAL
CLIMB 3 TO 5 STEPS WITH RAILING: TOTAL
PERSONAL GROOMING: TOTAL
DRESSING REGULAR LOWER BODY CLOTHING: TOTAL
HELP NEEDED FOR BATHING: TOTAL
STANDING UP FROM CHAIR USING ARMS: TOTAL
MOVING TO AND FROM BED TO CHAIR: TOTAL
PERSONAL GROOMING: TOTAL
DAILY ACTIVITIY SCORE: 6
DRESSING REGULAR UPPER BODY CLOTHING: TOTAL
WALKING IN HOSPITAL ROOM: TOTAL
EATING MEALS: TOTAL

## 2024-11-02 ASSESSMENT — PAIN - FUNCTIONAL ASSESSMENT
PAIN_FUNCTIONAL_ASSESSMENT: 0-10
PAIN_FUNCTIONAL_ASSESSMENT: CPOT (CRITICAL CARE PAIN OBSERVATION TOOL)

## 2024-11-02 ASSESSMENT — PAIN SCALES - GENERAL: PAINLEVEL_OUTOF10: 0 - NO PAIN

## 2024-11-02 NOTE — PROGRESS NOTES
"    Endocrinology Inpatient Consult Progress Note     PATIENT NAME: Bing Holliday  MRN: [id number]  DATE: 11/2/2024    CONSULTING PHYSICIAN: Dr. CHIDI Felipe  REASON FOR CONSULT: Adrenal Insufficiency.      Interval Events     Seen this morning.  The patient was more lethargic than previous.  She has tube feeds infusing, but not at goal rate.  She is not agitated.  I have discussed the patient's case with her but nighttime RN.      Physical Examination     /74 (BP Location: Left arm, Patient Position: Lying)   Pulse 94   Temp 36 °C (96.8 °F) (Temporal)   Resp 15   Ht 1.626 m (5' 4\")   Wt 82.1 kg (181 lb)   LMP  (LMP Unknown)   SpO2 97%   BMI 31.07 kg/m²   No acute distress.  Appears chronically ill.  Sullen.  Cachectic.  Regular rate and rhythm.  Nonlabored respiration.  Soft nontender nondistended obese abdomen.  Trace pedal edema bilaterally.      Medications     Reviewed MAR       Data     Recent Labs and Imaging Reviewed      Assessment / Plan        # Secondary Adrenal Insufficiency: Per my previous note.  Continue glucocorticoids.  Presented with adrenal crisis.  Needs a durable way to receive medications, specifically her steroids.  She has pulled out Corpak's in the past.  Per notes, the patient's son would like to continue medical treatments.  Her steroids are essential to life.    # T2DM: Continue lispro sliding scale for now.  The patient has a history of hypoglycemia which is not related to insulin.  She is now on tube feeds and steroid so I do expect that there will be some hyperglycemia on the horizon.    # Abnormal TFT: Has been worked up in the past.  No TSH greater than 10.  Her TSH on arrival was in the 4 range.  No indication for levothyroxine from my perspective.    # Osteoporosis: Definitely exacerbated by the fact that she keeps coming in with adrenal crisis and getting intravenous stress dose glucocorticoids.  Unfortunately think the patient has been seen in the outpatient as she " spends most of her time in the hospital.  Perhaps we can consider giving her a dose of zoledronic acid.       Avi Sloan, DO  Endocrinology, Diabetes, and Metabolism    Available via [employer] Messenger    Please excuse any typographical or unwanted errors within this documentation as voice recognition software was used to dictate this note.

## 2024-11-02 NOTE — PROGRESS NOTES
"Bing Holliday is a 63 y.o. female on day 4 of admission presenting with Hypothermia, initial encounter.      Subjective   Patient appears slightly more alert today. She was able to respond \"yes\" or \"no\" to questions. She denies pain, dyspnea.        Objective     Last Recorded Vitals  /74 (BP Location: Left arm, Patient Position: Lying)   Pulse 94   Temp 36.3 °C (97.3 °F) (Temporal)   Resp 14   Wt 82.1 kg (181 lb)   SpO2 97%   Intake/Output last 3 Shifts:    Intake/Output Summary (Last 24 hours) at 11/2/2024 1217  Last data filed at 11/2/2024 0705  Gross per 24 hour   Intake 1710 ml   Output 1230 ml   Net 480 ml       Admission Weight  Weight: 79.4 kg (175 lb) (10/29/24 1819)    Daily Weight  11/01/24 : 82.1 kg (181 lb)    Physical exam:  Gen: lethargic, no acute distress  HEENT: MMM, EOMI, no scleral icterus  Neck: supple, no LAD  CV: irregularly irregular, no murmurs   Lungs: good air entry b/l, trace b/l wheezing anteriorly, coughing throughout exam  Abd: soft, nontender, nondistended, normoactive BS, corpak in place  Ext: WWP, no edema  Skin: no rashes/lesions  Neuro: lethargic but slightly more alert today, follows commands, answers \"yes/no\" questions     Results for orders placed or performed during the hospital encounter of 10/29/24 (from the past 24 hours)   POCT GLUCOSE   Result Value Ref Range    POCT Glucose 119 (H) 74 - 99 mg/dL   POCT GLUCOSE   Result Value Ref Range    POCT Glucose 158 (H) 74 - 99 mg/dL   POCT GLUCOSE   Result Value Ref Range    POCT Glucose 149 (H) 74 - 99 mg/dL   POCT GLUCOSE   Result Value Ref Range    POCT Glucose 193 (H) 74 - 99 mg/dL   Renal function panel   Result Value Ref Range    Glucose 187 (H) 74 - 99 mg/dL    Sodium 143 136 - 145 mmol/L    Potassium 3.5 3.5 - 5.3 mmol/L    Chloride 110 (H) 98 - 107 mmol/L    Bicarbonate 23 21 - 32 mmol/L    Anion Gap 14 10 - 20 mmol/L    Urea Nitrogen 21 6 - 23 mg/dL    Creatinine 1.76 (H) 0.50 - 1.05 mg/dL    eGFR 32 (L) >60 " mL/min/1.73m*2    Calcium 8.8 8.6 - 10.3 mg/dL    Phosphorus 4.8 2.5 - 4.9 mg/dL    Albumin 2.8 (L) 3.4 - 5.0 g/dL   Magnesium   Result Value Ref Range    Magnesium 1.91 1.60 - 2.40 mg/dL   CBC   Result Value Ref Range    WBC 9.3 4.4 - 11.3 x10*3/uL    nRBC 1.6 (H) 0.0 - 0.0 /100 WBCs    RBC 2.86 (L) 4.00 - 5.20 x10*6/uL    Hemoglobin 8.3 (L) 12.0 - 16.0 g/dL    Hematocrit 27.8 (L) 36.0 - 46.0 %    MCV 97 80 - 100 fL    MCH 29.0 26.0 - 34.0 pg    MCHC 29.9 (L) 32.0 - 36.0 g/dL    RDW 21.4 (H) 11.5 - 14.5 %    Platelets 63 (L) 150 - 450 x10*3/uL   POCT GLUCOSE   Result Value Ref Range    POCT Glucose 209 (H) 74 - 99 mg/dL     *Note: Due to a large number of results and/or encounters for the requested time period, some results have not been displayed. A complete set of results can be found in Results Review.        Image Results  XR abdomen 1 view  Narrative: Interpreted By:  Roni Dwyer,   STUDY:  XR ABDOMEN 1 VIEW;  11/1/2024 8:23 am      INDICATION:  Signs/Symptoms:corpak placement.      COMPARISON:  None.      ACCESSION NUMBER(S):  [id number]      ORDERING CLINICIAN:  YULIANA BARRETO      FINDINGS:  Feeding tube tip in the distal stomach. Cardiomegaly.      Impression: Feeding tube tip in the distal stomach.      MACRO:  None      Signed by: Roni Dwyer 11/1/2024 8:28 AM  Dictation workstation:   [id number]    Scheduled medications  atorvastatin, 40 mg, oral, Nightly  budesonide, 0.5 mg, nebulization, BID  heparin (porcine), 5,000 Units, subcutaneous, q8h  [Held by provider] hydrocortisone, 10 mg, oral, q PM  [Held by provider] hydrocortisone, 20 mg, oral, q AM  hydrocortisone sodium succinate, 50 mg, intravenous, q8h  levETIRAcetam, 500 mg, nasogastric tube, BID  melatonin, 5 mg, oral, Nightly  [Held by provider] metoprolol tartrate, 25 mg, oral, BID  mycophenolate, 1,000 mg, oral, BID  sertraline, 25 mg, oral, Daily      Continuous medications     PRN medications  PRN medications: acetaminophen **OR**  [DISCONTINUED] acetaminophen **OR** [DISCONTINUED] acetaminophen, dextrose, dextrose, glucagon, glucagon, ipratropium-albuteroL      Assessment/Plan      This patient has a urinary catheter              Reason for the urinary catheter remaining today? urinary retention/bladder outlet obstruction, acute or chronic         Ms Holliday is a 64yo woman with SLE with multiple complications, adrenal insufficiency c/b multiple hospitalizations for adrenal crisis, CKDIII, HRmrEF, CVA, COPD, afib not on AC d/t pancytopenia, dysphagia admitted for hypotension in setting of inability to take steroids at SNF. She was started on stress dose steroids in ER, which had been weaned to 25mg TID 10/31, however steroid dose increased to 50 mg TID 11/1 due to worsening lethargy. GI consulted for PEG placement in the setting of enteral support/med needs, however she is not a candidate for PEG due to comorbidities. Son is not interested in hospice at this time. Corpak replaced. She has improved slightly on current steroid dosing.      #SLE  #adrenal insufficiency  #encephalopathy  -historically, patient has required a slow taper of steroids as she quickly develops encephalopathy. Steroids increased to 50mg TID yesterday with some response. Will continue 50mg TID today, and taper to  25mg TID tomorrow if she continues to improve.   -Home dose is 20mg qAM, 10mg qPM  -corpak placed for med administration  -no longer requires jacqueline hugger  -continue cellcept   -will re-engage son and palliative care if encephalopathy fails to improve.      #dysphagia  -historically due to encephaloapthy, waxes and wanes  -speech following  -corpak placed this AM  -continue TF     #HFmrEF  #COPD  #DMII  #CKD  #seizure d/o   -continue ICS, duonebs  -continue statin   -continue keppra      FEN/GI: TF, speech following  Access: PIV  Ppx: GERALD  Code: DNAR/DNI - patient's family not interested in hospice     Dispo: back to SNF pending final steroid dosing, abx regimen.  Hopefully early next week.            Malnutrition Diagnosis Status: New  Malnutrition Diagnosis: Severe malnutrition related to chronic disease or condition  As Evidenced by: recurrent adrenal crisis with inconsistent oral intakes >1 month resulting in 6% weight loss x1 month, 15% x3 months and 12% x6 months and severe fat and muscle mass wasting (Pt EN dependent 10/3-approximately 10/26/2024 when pt reportedly pulled out corpak.)  I agree with the dietitian's malnutrition diagnosis.                     Yolanda Moreno MD

## 2024-11-03 VITALS
DIASTOLIC BLOOD PRESSURE: 80 MMHG | HEIGHT: 64 IN | OXYGEN SATURATION: 93 % | RESPIRATION RATE: 15 BRPM | HEART RATE: 94 BPM | TEMPERATURE: 98.2 F | SYSTOLIC BLOOD PRESSURE: 125 MMHG | WEIGHT: 174.38 LBS | BODY MASS INDEX: 29.77 KG/M2

## 2024-11-03 LAB
BACTERIA BLD CULT: NORMAL
GLUCOSE BLD MANUAL STRIP-MCNC: 308 MG/DL (ref 74–99)
GLUCOSE BLD MANUAL STRIP-MCNC: 314 MG/DL (ref 74–99)

## 2024-11-03 PROCEDURE — 87493 C DIFF AMPLIFIED PROBE: CPT | Mod: STJLAB | Performed by: STUDENT IN AN ORGANIZED HEALTH CARE EDUCATION/TRAINING PROGRAM

## 2024-11-03 PROCEDURE — 99233 SBSQ HOSP IP/OBS HIGH 50: CPT | Performed by: STUDENT IN AN ORGANIZED HEALTH CARE EDUCATION/TRAINING PROGRAM

## 2024-11-03 PROCEDURE — 2060000001 HC INTERMEDIATE ICU ROOM DAILY

## 2024-11-03 PROCEDURE — 82947 ASSAY GLUCOSE BLOOD QUANT: CPT

## 2024-11-03 PROCEDURE — 2500000005 HC RX 250 GENERAL PHARMACY W/O HCPCS: Performed by: STUDENT IN AN ORGANIZED HEALTH CARE EDUCATION/TRAINING PROGRAM

## 2024-11-03 PROCEDURE — 2500000004 HC RX 250 GENERAL PHARMACY W/ HCPCS (ALT 636 FOR OP/ED): Performed by: INTERNAL MEDICINE

## 2024-11-03 PROCEDURE — 2500000001 HC RX 250 WO HCPCS SELF ADMINISTERED DRUGS (ALT 637 FOR MEDICARE OP): Performed by: STUDENT IN AN ORGANIZED HEALTH CARE EDUCATION/TRAINING PROGRAM

## 2024-11-03 PROCEDURE — 2500000002 HC RX 250 W HCPCS SELF ADMINISTERED DRUGS (ALT 637 FOR MEDICARE OP, ALT 636 FOR OP/ED): Performed by: INTERNAL MEDICINE

## 2024-11-03 PROCEDURE — 2500000002 HC RX 250 W HCPCS SELF ADMINISTERED DRUGS (ALT 637 FOR MEDICARE OP, ALT 636 FOR OP/ED)

## 2024-11-03 PROCEDURE — 94640 AIRWAY INHALATION TREATMENT: CPT

## 2024-11-03 PROCEDURE — 2500000004 HC RX 250 GENERAL PHARMACY W/ HCPCS (ALT 636 FOR OP/ED): Performed by: STUDENT IN AN ORGANIZED HEALTH CARE EDUCATION/TRAINING PROGRAM

## 2024-11-03 PROCEDURE — 2500000001 HC RX 250 WO HCPCS SELF ADMINISTERED DRUGS (ALT 637 FOR MEDICARE OP): Performed by: INTERNAL MEDICINE

## 2024-11-03 PROCEDURE — 2500000002 HC RX 250 W HCPCS SELF ADMINISTERED DRUGS (ALT 637 FOR MEDICARE OP, ALT 636 FOR OP/ED): Performed by: STUDENT IN AN ORGANIZED HEALTH CARE EDUCATION/TRAINING PROGRAM

## 2024-11-03 RX ORDER — MYCOPHENOLATE MOFETIL 200 MG/ML
1000 POWDER, FOR SUSPENSION ORAL 2 TIMES DAILY
Status: DISCONTINUED | OUTPATIENT
Start: 2024-11-03 | End: 2024-11-06 | Stop reason: HOSPADM

## 2024-11-03 RX ORDER — INSULIN GLARGINE 100 [IU]/ML
5 INJECTION, SOLUTION SUBCUTANEOUS NIGHTLY
Status: DISCONTINUED | OUTPATIENT
Start: 2024-11-03 | End: 2024-11-05

## 2024-11-03 RX ORDER — INSULIN LISPRO 100 [IU]/ML
3 INJECTION, SOLUTION INTRAVENOUS; SUBCUTANEOUS ONCE
Status: COMPLETED | OUTPATIENT
Start: 2024-11-03 | End: 2024-11-03

## 2024-11-03 RX ORDER — INSULIN LISPRO 100 [IU]/ML
4 INJECTION, SOLUTION INTRAVENOUS; SUBCUTANEOUS ONCE
Status: COMPLETED | OUTPATIENT
Start: 2024-11-03 | End: 2024-11-03

## 2024-11-03 ASSESSMENT — COGNITIVE AND FUNCTIONAL STATUS - GENERAL
DAILY ACTIVITIY SCORE: 6
MOBILITY SCORE: 6
HELP NEEDED FOR BATHING: TOTAL
TURNING FROM BACK TO SIDE WHILE IN FLAT BAD: TOTAL
MOVING TO AND FROM BED TO CHAIR: TOTAL
CLIMB 3 TO 5 STEPS WITH RAILING: TOTAL
PERSONAL GROOMING: TOTAL
MOVING FROM LYING ON BACK TO SITTING ON SIDE OF FLAT BED WITH BEDRAILS: TOTAL
WALKING IN HOSPITAL ROOM: TOTAL
DRESSING REGULAR UPPER BODY CLOTHING: TOTAL
STANDING UP FROM CHAIR USING ARMS: TOTAL
TOILETING: TOTAL
EATING MEALS: TOTAL
DRESSING REGULAR LOWER BODY CLOTHING: TOTAL

## 2024-11-03 ASSESSMENT — PAIN - FUNCTIONAL ASSESSMENT
PAIN_FUNCTIONAL_ASSESSMENT: 0-10
PAIN_FUNCTIONAL_ASSESSMENT: CPOT (CRITICAL CARE PAIN OBSERVATION TOOL)
PAIN_FUNCTIONAL_ASSESSMENT: 0-10
PAIN_FUNCTIONAL_ASSESSMENT: CPOT (CRITICAL CARE PAIN OBSERVATION TOOL)

## 2024-11-03 ASSESSMENT — PAIN SCALES - GENERAL
PAINLEVEL_OUTOF10: 0 - NO PAIN
PAINLEVEL_OUTOF10: 0 - NO PAIN

## 2024-11-03 NOTE — PROGRESS NOTES
"Bing Holliday is a 63 y.o. female on day 5 of admission presenting with Hypothermia, initial encounter.      Subjective   Patient febrile overnight to 38.0. Had multiple watery Bms - C diff sent. She is minimally responsive to me this morning but is able to mouth \"yes\" or \"no.\" Denies pain, denies shortness of breath.     I spoke with son López about current clinical status. Given recurrent admissions and the fact that she is not a candidate for PEG, I expressed that now would be an appropriate time to consider hospice transition. He agreed to meet with hospice.        Objective     Last Recorded Vitals  /70   Pulse 84   Temp 37.8 °C (100 °F)   Resp 17   Wt 79.1 kg (174 lb 6.1 oz)   SpO2 96%   Intake/Output last 3 Shifts:    Intake/Output Summary (Last 24 hours) at 11/3/2024 1301  Last data filed at 11/3/2024 0700  Gross per 24 hour   Intake 2740.83 ml   Output 445 ml   Net 2295.83 ml       Admission Weight  Weight: 79.4 kg (175 lb) (10/29/24 1819)    Daily Weight  11/03/24 : 79.1 kg (174 lb 6.1 oz)    Physical exam:  Gen: lethargic, no acute distress, able to mouth some words  HEENT: MMM, EOMI, no scleral icterus  CV: irregularly irregular, no murmurs   Lungs: good air entry b/l anteriorly and laterally, no wheezing or rhonchi noted, coughing throughout exam  Abd: soft, nontender, nondistended, normoactive BS, corpak in place  Ext: WWP, no edema  Skin: no rashes/lesions  Neuro: able to mouth some words, able to follow simple commands    Results for orders placed or performed during the hospital encounter of 10/29/24 (from the past 24 hours)   POCT GLUCOSE   Result Value Ref Range    POCT Glucose 188 (H) 74 - 99 mg/dL   Blood Culture    Specimen: Peripheral Venipuncture; Blood culture   Result Value Ref Range    Blood Culture Loaded on Instrument - Culture in progress    Blood Culture    Specimen: Peripheral Venipuncture; Blood culture   Result Value Ref Range    Blood Culture Loaded on Instrument - " Culture in progress    POCT GLUCOSE   Result Value Ref Range    POCT Glucose 212 (H) 74 - 99 mg/dL   POCT GLUCOSE   Result Value Ref Range    POCT Glucose 229 (H) 74 - 99 mg/dL   POCT GLUCOSE   Result Value Ref Range    POCT Glucose 308 (H) 74 - 99 mg/dL     *Note: Due to a large number of results and/or encounters for the requested time period, some results have not been displayed. A complete set of results can be found in Results Review.         Image Results  XR abdomen 1 view  Narrative: Interpreted By:  Roni Dwyer,   STUDY:  XR ABDOMEN 1 VIEW;  11/1/2024 8:23 am      INDICATION:  Signs/Symptoms:corpak placement.      COMPARISON:  None.      ACCESSION NUMBER(S):  [id number]      ORDERING CLINICIAN:  YULIANA BARRETO      FINDINGS:  Feeding tube tip in the distal stomach. Cardiomegaly.      Impression: Feeding tube tip in the distal stomach.      MACRO:  None      Signed by: Roni Dwyer 11/1/2024 8:28 AM  Dictation workstation:   [id number]    Scheduled medications  atorvastatin, 40 mg, oral, Nightly  budesonide, 0.5 mg, nebulization, BID  heparin (porcine), 5,000 Units, subcutaneous, q8h  [Held by provider] hydrocortisone, 10 mg, oral, q PM  [Held by provider] hydrocortisone, 20 mg, oral, q AM  hydrocortisone sodium succinate, 50 mg, intravenous, q8h  insulin glargine, 5 Units, subcutaneous, Nightly  levETIRAcetam, 500 mg, nasogastric tube, BID  melatonin, 5 mg, oral, Nightly  metoprolol tartrate, 25 mg, nasogastric tube, BID  mycophenolate, 1,000 mg, oral, BID  sertraline, 25 mg, oral, Daily      Continuous medications     PRN medications  PRN medications: acetaminophen, dextrose, dextrose, glucagon, glucagon, ipratropium-albuteroL, oxygen      Assessment/Plan    Ms Holliday is a 64yo woman with SLE with multiple complications, adrenal insufficiency c/b multiple hospitalizations for adrenal crisis, CKDIII, HRmrEF, CVA, COPD, afib not on AC d/t pancytopenia, dysphagia admitted for hypotension in setting of  inability to take steroids at SNF. She was started on stress dose steroids in ER, which had been weaned to 25mg TID 10/31, however steroid dose increased to 50 mg TID 11/1 due to worsening lethargy. GI consulted for PEG placement in the setting of enteral support/med needs, however she is not a candidate for PEG due to comorbidities. Febrile overnight 11/2, infectious workup including blood cx, C diff, UA/Ucx pending.     I discussed clinical status with son López including lack of improvement on high dose steroids, recurrent admissions with little time outside of the hospital, dependence on enteral feeds, as well as the fact that she is not a candidate for PEG. He expressed understanding and was agreeable to meeting with hospice.     #fever  -persistent, low grade, unclear source  -blood cx ordered and pending  -change ruano, repeat UA/Ucx  -CXR with no obvious infiltrate, read pending  -no s/sx SSTI  -hold off on empiric abx pending data  -will continue high dose steroids while infectious workup pending given prior adrenal crisis/septic shock with infections requiring stress does steroids.      #SLE  #adrenal insufficiency  #encephalopathy  -Steroids increased to 50mg TID 11/1. Will continue 50mg TID today given fever/infection.   -Home dose is 20mg qAM, 10mg qPM  -corpak placed for med administration  -no longer requires jacqueline hugger  -continue cellcept   -hospice and palliative care consults placed.     #dysphagia  -historically due to encephaloapthy, waxes and wanes  -speech following  -corpak  -continue TF  -not candidate for PEG per GI consult     #HFmrEF  #COPD  #DMII  #CKD  #seizure d/o   -continue ICS, duonebs  -continue statin   -continue keppra      FEN/GI: TF, speech following  Access: PIV  Ppx: GERALD  Code: DNAR/DNI. Hospice consult placed.      Dispo: TBD. Hospice consult placed. Prognosis very poor.            Malnutrition Diagnosis Status: New  Malnutrition Diagnosis: Severe malnutrition related to  chronic disease or condition  As Evidenced by: recurrent adrenal crisis with inconsistent oral intakes >1 month resulting in 6% weight loss x1 month, 15% x3 months and 12% x6 months and severe fat and muscle mass wasting (Pt EN dependent 10/3-approximately 10/26/2024 when pt reportedly pulled out corpak.)  I agree with the dietitian's malnutrition diagnosis.      This patient has a urinary catheter   Reason for the urinary catheter remaining today? urinary retention/bladder outlet obstruction, acute or chronic           Yolanda Moreno MD

## 2024-11-03 NOTE — HOSPITAL COURSE
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 She was started on stress dose steroids in ER. ID consutled given recent ESBL Klebsiella infection, however Ucx with no significant growth so abx discontinued. GI consulted for PEG placement in the setting of enteral support/med needs, however she is not a candidate for PEG due to comorbidities and immunosuppression.  Patient failed to make significant improvements despite high dose steroids and close care. GOC disucssed with son, who had previously been considering hospice. He was ultimately agreeable to meeting with hospice.

## 2024-11-03 NOTE — CARE PLAN
Problem: Nutrition  Goal: Less than 5 days NPO/clear liquids  Outcome: Progressing  Goal: Oral intake greater than 50%  Outcome: Progressing  Goal: Consume prescribed supplement  Outcome: Progressing  Goal: Nutrition support goals are met within 48 hrs  Outcome: Progressing  Goal: Nutrition support is meeting 75% of nutrient needs  Outcome: Progressing  Goal: Tube feed tolerance  Outcome: Progressing  Goal: Lab values WNL  Outcome: Progressing  Goal: Electrolytes WNL  Outcome: Progressing  Goal: Promote healing  Outcome: Progressing  Goal: Maintain stable weight  Outcome: Progressing  Goal: Reduce weight from edema/fluid  Outcome: Progressing  Goal: Gradual weight gain  Outcome: Progressing  Goal: BG  mg/dL  Outcome: Progressing     Problem: Pain - Adult  Goal: Verbalizes/displays adequate comfort level or baseline comfort level  Outcome: Progressing     Problem: Safety - Adult  Goal: Free from fall injury  Outcome: Progressing     Problem: Discharge Planning  Goal: Discharge to home or other facility with appropriate resources  Outcome: Progressing     Problem: Chronic Conditions and Co-morbidities  Goal: Patient's chronic conditions and co-morbidity symptoms are monitored and maintained or improved  Outcome: Progressing     Problem: Skin  Goal: Participates in plan/prevention/treatment measures  Outcome: Progressing  Flowsheets (Taken 11/3/2024 0058)  Participates in plan/prevention/treatment measures: Elevate heels  Goal: Prevent/manage excess moisture  Outcome: Progressing  Flowsheets (Taken 11/3/2024 0058)  Prevent/manage excess moisture:   Cleanse incontinence/protect with barrier cream   Moisturize dry skin  Goal: Prevent/minimize sheer/friction injuries  Outcome: Progressing  Flowsheets (Taken 11/3/2024 0058)  Prevent/minimize sheer/friction injuries:   Use pull sheet   Turn/reposition every 2 hours/use positioning/transfer devices   HOB 30 degrees or less  Goal: Promote/optimize nutrition  Outcome:  Progressing  Flowsheets (Taken 11/3/2024 0058)  Promote/optimize nutrition: Monitor/record intake including meals  Goal: Promote skin healing  Outcome: Progressing  Flowsheets (Taken 11/3/2024 0058)  Promote skin healing:   Turn/reposition every 2 hours/use positioning/transfer devices   Protective dressings over bony prominences     Problem: Fall/Injury  Goal: Not fall by end of shift  Outcome: Progressing  Goal: Be free from injury by end of the shift  Outcome: Progressing  Goal: Verbalize understanding of personal risk factors for fall in the hospital  Outcome: Progressing  Goal: Verbalize understanding of risk factor reduction measures to prevent injury from fall in the home  Outcome: Progressing  Goal: Use assistive devices by end of the shift  Outcome: Progressing  Goal: Pace activities to prevent fatigue by end of the shift  Outcome: Progressing     Problem: Diabetes  Goal: Achieve decreasing blood glucose levels by end of shift  Outcome: Progressing  Goal: Increase stability of blood glucose readings by end of shift  Outcome: Progressing  Goal: Decrease in ketones present in urine by end of shift  Outcome: Progressing  Goal: Maintain electrolyte levels within acceptable range throughout shift  Outcome: Progressing  Goal: Maintain glucose levels >70mg/dl to <250mg/dl throughout shift  Outcome: Progressing  Goal: No changes in neurological exam by end of shift  Outcome: Progressing  Goal: Learn about and adhere to nutrition recommendations by end of shift  Outcome: Progressing  Goal: Vital signs within normal range for age by end of shift  Outcome: Progressing  Goal: Increase self care and/or family involovement by end of shift  Outcome: Progressing  Goal: Receive DSME education by end of shift  Outcome: Progressing   The patient's goals for the shift include      The clinical goals for the shift include to remain free of s/sx of adverse reactions from ATB's. Remain HDS throughout shift.

## 2024-11-04 LAB
ALBUMIN SERPL BCP-MCNC: 3 G/DL (ref 3.4–5)
ANION GAP SERPL CALC-SCNC: 12 MMOL/L (ref 10–20)
BUN SERPL-MCNC: 30 MG/DL (ref 6–23)
C DIF TOX TCDA+TCDB STL QL NAA+PROBE: NOT DETECTED
CALCIUM SERPL-MCNC: 8.4 MG/DL (ref 8.6–10.3)
CHLORIDE SERPL-SCNC: 105 MMOL/L (ref 98–107)
CO2 SERPL-SCNC: 26 MMOL/L (ref 21–32)
CREAT SERPL-MCNC: 1.37 MG/DL (ref 0.5–1.05)
EGFRCR SERPLBLD CKD-EPI 2021: 44 ML/MIN/1.73M*2
ERYTHROCYTE [DISTWIDTH] IN BLOOD BY AUTOMATED COUNT: 21.2 % (ref 11.5–14.5)
GLUCOSE BLD MANUAL STRIP-MCNC: 260 MG/DL (ref 74–99)
GLUCOSE BLD MANUAL STRIP-MCNC: 279 MG/DL (ref 74–99)
GLUCOSE BLD MANUAL STRIP-MCNC: 294 MG/DL (ref 74–99)
GLUCOSE BLD MANUAL STRIP-MCNC: 316 MG/DL (ref 74–99)
GLUCOSE BLD MANUAL STRIP-MCNC: 328 MG/DL (ref 74–99)
GLUCOSE SERPL-MCNC: 308 MG/DL (ref 74–99)
HCT VFR BLD AUTO: 28.2 % (ref 36–46)
HGB BLD-MCNC: 8.1 G/DL (ref 12–16)
MAGNESIUM SERPL-MCNC: 1.95 MG/DL (ref 1.6–2.4)
MCH RBC QN AUTO: 28.5 PG (ref 26–34)
MCHC RBC AUTO-ENTMCNC: 28.7 G/DL (ref 32–36)
MCV RBC AUTO: 99 FL (ref 80–100)
NRBC BLD-RTO: 1.4 /100 WBCS (ref 0–0)
PHOSPHATE SERPL-MCNC: 3 MG/DL (ref 2.5–4.9)
PLATELET # BLD AUTO: 71 X10*3/UL (ref 150–450)
POTASSIUM SERPL-SCNC: 4.1 MMOL/L (ref 3.5–5.3)
RBC # BLD AUTO: 2.84 X10*6/UL (ref 4–5.2)
SODIUM SERPL-SCNC: 139 MMOL/L (ref 136–145)
WBC # BLD AUTO: 12.5 X10*3/UL (ref 4.4–11.3)

## 2024-11-04 PROCEDURE — 99233 SBSQ HOSP IP/OBS HIGH 50: CPT | Performed by: STUDENT IN AN ORGANIZED HEALTH CARE EDUCATION/TRAINING PROGRAM

## 2024-11-04 PROCEDURE — 2500000002 HC RX 250 W HCPCS SELF ADMINISTERED DRUGS (ALT 637 FOR MEDICARE OP, ALT 636 FOR OP/ED)

## 2024-11-04 PROCEDURE — 2500000005 HC RX 250 GENERAL PHARMACY W/O HCPCS: Performed by: STUDENT IN AN ORGANIZED HEALTH CARE EDUCATION/TRAINING PROGRAM

## 2024-11-04 PROCEDURE — 2500000004 HC RX 250 GENERAL PHARMACY W/ HCPCS (ALT 636 FOR OP/ED): Performed by: STUDENT IN AN ORGANIZED HEALTH CARE EDUCATION/TRAINING PROGRAM

## 2024-11-04 PROCEDURE — 2500000004 HC RX 250 GENERAL PHARMACY W/ HCPCS (ALT 636 FOR OP/ED): Performed by: INTERNAL MEDICINE

## 2024-11-04 PROCEDURE — 82947 ASSAY GLUCOSE BLOOD QUANT: CPT

## 2024-11-04 PROCEDURE — 2500000002 HC RX 250 W HCPCS SELF ADMINISTERED DRUGS (ALT 637 FOR MEDICARE OP, ALT 636 FOR OP/ED): Performed by: INTERNAL MEDICINE

## 2024-11-04 PROCEDURE — 80069 RENAL FUNCTION PANEL: CPT | Performed by: STUDENT IN AN ORGANIZED HEALTH CARE EDUCATION/TRAINING PROGRAM

## 2024-11-04 PROCEDURE — 83735 ASSAY OF MAGNESIUM: CPT | Performed by: STUDENT IN AN ORGANIZED HEALTH CARE EDUCATION/TRAINING PROGRAM

## 2024-11-04 PROCEDURE — 1100000001 HC PRIVATE ROOM DAILY

## 2024-11-04 PROCEDURE — 85027 COMPLETE CBC AUTOMATED: CPT | Performed by: STUDENT IN AN ORGANIZED HEALTH CARE EDUCATION/TRAINING PROGRAM

## 2024-11-04 PROCEDURE — 36415 COLL VENOUS BLD VENIPUNCTURE: CPT | Performed by: STUDENT IN AN ORGANIZED HEALTH CARE EDUCATION/TRAINING PROGRAM

## 2024-11-04 PROCEDURE — 2500000001 HC RX 250 WO HCPCS SELF ADMINISTERED DRUGS (ALT 637 FOR MEDICARE OP): Performed by: INTERNAL MEDICINE

## 2024-11-04 PROCEDURE — 2500000001 HC RX 250 WO HCPCS SELF ADMINISTERED DRUGS (ALT 637 FOR MEDICARE OP): Performed by: STUDENT IN AN ORGANIZED HEALTH CARE EDUCATION/TRAINING PROGRAM

## 2024-11-04 PROCEDURE — 94640 AIRWAY INHALATION TREATMENT: CPT

## 2024-11-04 PROCEDURE — 2500000002 HC RX 250 W HCPCS SELF ADMINISTERED DRUGS (ALT 637 FOR MEDICARE OP, ALT 636 FOR OP/ED): Performed by: STUDENT IN AN ORGANIZED HEALTH CARE EDUCATION/TRAINING PROGRAM

## 2024-11-04 RX ORDER — DEXTROSE 50 % IN WATER (D50W) INTRAVENOUS SYRINGE
12.5
Status: DISCONTINUED | OUTPATIENT
Start: 2024-11-04 | End: 2024-11-06 | Stop reason: HOSPADM

## 2024-11-04 RX ORDER — INSULIN LISPRO 100 [IU]/ML
4 INJECTION, SOLUTION INTRAVENOUS; SUBCUTANEOUS ONCE
Status: COMPLETED | OUTPATIENT
Start: 2024-11-04 | End: 2024-11-04

## 2024-11-04 RX ORDER — DEXTROSE 50 % IN WATER (D50W) INTRAVENOUS SYRINGE
25
Status: DISCONTINUED | OUTPATIENT
Start: 2024-11-04 | End: 2024-11-06 | Stop reason: HOSPADM

## 2024-11-04 ASSESSMENT — COGNITIVE AND FUNCTIONAL STATUS - GENERAL
MOVING TO AND FROM BED TO CHAIR: TOTAL
DRESSING REGULAR LOWER BODY CLOTHING: TOTAL
CLIMB 3 TO 5 STEPS WITH RAILING: TOTAL
EATING MEALS: TOTAL
DRESSING REGULAR UPPER BODY CLOTHING: TOTAL
CLIMB 3 TO 5 STEPS WITH RAILING: TOTAL
HELP NEEDED FOR BATHING: TOTAL
STANDING UP FROM CHAIR USING ARMS: TOTAL
TURNING FROM BACK TO SIDE WHILE IN FLAT BAD: TOTAL
MOBILITY SCORE: 6
MOVING FROM LYING ON BACK TO SITTING ON SIDE OF FLAT BED WITH BEDRAILS: TOTAL
TURNING FROM BACK TO SIDE WHILE IN FLAT BAD: TOTAL
PERSONAL GROOMING: TOTAL
WALKING IN HOSPITAL ROOM: TOTAL
WALKING IN HOSPITAL ROOM: TOTAL
TOILETING: TOTAL
MOBILITY SCORE: 6
MOVING FROM LYING ON BACK TO SITTING ON SIDE OF FLAT BED WITH BEDRAILS: TOTAL
STANDING UP FROM CHAIR USING ARMS: TOTAL
MOVING TO AND FROM BED TO CHAIR: TOTAL
DAILY ACTIVITIY SCORE: 6

## 2024-11-04 ASSESSMENT — PAIN - FUNCTIONAL ASSESSMENT
PAIN_FUNCTIONAL_ASSESSMENT: PAINAD (PAIN ASSESSMENT IN ADVANCED DEMENTIA SCALE)
PAIN_FUNCTIONAL_ASSESSMENT: 0-10

## 2024-11-04 ASSESSMENT — PAIN SCALES - GENERAL
PAINLEVEL_OUTOF10: 0 - NO PAIN

## 2024-11-04 NOTE — PROGRESS NOTES
"    Endocrinology Inpatient Consult Progress Note     PATIENT NAME: Bing Holliday  MRN: [id number]  DATE: 11/4/2024    CONSULTING PHYSICIAN: Dr. CHIDI Felipe  REASON FOR CONSULT: Adrenal Insufficiency.      Interval Events     Unable to obtain review of systems secondary to mental status.  The patient is very somnolent today.  She is arousable.  She response to noxious stimuli.  She opens her eyes but then falls back asleep.  She answers to her name.  She was febrile yesterday.      Physical Examination     BP (!) 138/93   Pulse 98   Temp 36.6 °C (97.9 °F)   Resp 25   Ht 1.626 m (5' 4\")   Wt 79.1 kg (174 lb 6.1 oz)   LMP  (LMP Unknown)   SpO2 99%   BMI 29.93 kg/m²   No acute distress.  Appears chronically ill.  Sullen.  Cachectic.  Regular rate and rhythm.  Nonlabored respiration.  Soft nontender nondistended obese abdomen.  Trace pedal edema bilaterally.      Medications     Reviewed MAR       Data     Recent Labs and Imaging Reviewed      Assessment / Plan        # Secondary Adrenal Insufficiency: Reviewed internal medicine notes.  Patient sent to meet with palliative medicine/hospice.  In the meantime until we sort out her goals of care, I think we can continue her on IV glucocorticoids.  She is febrile so she should continue IV hydrocortisone 50 mg every 8 hours.    # T2DM: Sugars are high.  The patient does have a history of hypoglycemia with insulin administration.  Her worsening hyperglycemia is likely secondary to glucocorticoids as well as tube feed administration.  I will schedule low-dose of regular insulin every 6 hours and change her sliding scale accordingly.     # Abnormal TFT: Has been worked up in the past.  No TSH greater than 10.  Her TSH on arrival was in the 4 range.  No indication for levothyroxine from my perspective.    # Osteoporosis: Definitely exacerbated by the fact that she keeps coming in with adrenal crisis and getting intravenous stress dose glucocorticoids.  Unfortunately think " the patient has been seen in the outpatient as she spends most of her time in the hospital.  Perhaps we can consider giving her a dose of zoledronic acid.       Avi Sloan, DO  Endocrinology, Diabetes, and Metabolism    Available via [employer] Messenger    Please excuse any typographical or unwanted errors within this documentation as voice recognition software was used to dictate this note.    39 y/o F pt with no significant PMHx, presents to the ED with complaints of head injury. 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 Patient reports pain along the posterior left occiput. Notes she has a headache and a hx of migraines. Denies fevers, nausea, emesis, chest pain, shortness of breath, or any other acute complaints. NKDA. 39 y/o F pt with no significant PMHx, presents to the ED with complaints of head injury. 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 Believes she may have had LOC for a few seconds. Patient reports pain along the posterior left occiput. Notes she has a headache and a hx of migraines. Denies fevers, nausea, emesis, chest pain, shortness of breath, or any other acute complaints. NKDA.

## 2024-11-04 NOTE — NURSING NOTE
Pt remained safe throughout shift. Vitals stable.  Pt remained afebrile. Glucose elevated throughout shift. One time dose lispro given twice. Pt tolerating tube feed well. Fecal management device placed.

## 2024-11-04 NOTE — PROGRESS NOTES
11/04/24 1517   Discharge Planning   Who is requesting discharge planning? Provider   Home or Post Acute Services Post acute facilities (Rehab/SNF/etc)   Type of Post Acute Facility Services Long term care   Expected Discharge Disposition Inter   Does the patient need discharge transport arranged? Yes   RoundTrip coordination needed? Yes   Has discharge transport been arranged? No     Patient is from [facility] and she in Long Term Care there. Hospice and palliative have been consulted and there was a meeting scheduled today for 2pm. Son has not signed with hospice so DSC was requested to place a return referral to facility. CT team will continue to follow patient.

## 2024-11-04 NOTE — CONSULTS
Reason For Consult  Hospice     History Of Present Illness  Bing Holliday is a 63 y.o. female presenting with SLE with multiple complications, adrenal insufficiency c/b multiple hospitalizations for adrenal crisis, CKDIII, HRmrEF, CVA, COPD, afib not on AC d/t pancytopenia, dysphagia admitted for hypotension in setting of inability to take steroids at SNF. She was started on stress dose steroids in ER, which had been weaned to 25mg TID 10/31, however steroid dose increased to 50 mg TID 11/1 due to worsening lethargy. GI consulted for PEG placement in the setting of enteral support/med needs, however she is not a candidate for PEG due to comorbidities. Febrile overnight 11/2, infectious workup including blood cx, C diff, UA/Ucx pending.           Assessment/Plan     Pt is familiar to the [facility] palliative care team from previous, recurrent admissions. Pt is chronically ill and has been seen many times throughout the past years, with increasing readmissions since June 2024. Hospice discussions have occurred multiple times with pts son, however family was not ready to engage in hospice previously. Palliative care and hospice consults were placed over the weekend, with attending and team discussing appropriateness of hospice care for pt and son is now in agreement with a hospice referral. Referral sent to [facility] to arrange meeting with son.    Hospice meeting scheduled for 2p today bedside4    3P  Hospice meeting was informational only at this time. [facility] transfer was offered to the son López, however he is not ready to sign pt on to hospice services today. He stated that he would be in touch with [facility] RN when ready to move forward. Discussed case with attending. Will follow up with López tomorrow. Pt continues to decline and hospice care is the most appropriate level of care at this time. This has been discussed with López by the medical team.    Will follow up tomorrow.         Ainsley Aviles,  RN

## 2024-11-04 NOTE — CONSULTS
Wound Care Consult     Visit Date: 11/4/2024      Patient Name: Bing Holliday         MRN: [id number]           YOB: 1960     Reason for Consult: Heel and coccyx wounds        Wound History: Present on admission     Pertinent Labs:   Albumin   Date Value Ref Range Status   11/04/2024 3.0 (L) 3.4 - 5.0 g/dL Final   04/29/2021 3.1 (L) 3.4 - 5.0 g/dL Final     Albumin, CSF   Date Value Ref Range Status   01/18/2024 25 0 - 35 mg/dL Final     Albumin Index   Date Value Ref Range Status   01/18/2024 10.7 (H) 0.0 - 9.0 ratio Final     Albumin, Serum   Date Value Ref Range Status   01/18/2024 2337 (L) 3500 - 5200 mg/dL Final       Wound Assessment:  Wound 08/01/24 Pressure Injury Sacrum (Active)   Site Assessment Pink;Red 11/04/24 0409   Batsheva-Wound Assessment Clean;Dry 11/04/24 0409   Pressure Injury Stage 2 11/03/24 2000   Wound Length (cm) 1 cm 10/30/24 1218   Wound Width (cm) 1.5 cm 10/30/24 1218   Wound Surface Area (cm^2) 1.5 cm^2 10/30/24 1218   Margins Well-defined edges 10/31/24 2000   Drainage Description Serosanguineous 11/04/24 0409   Drainage Amount Small 11/04/24 0409   Dressing Foam 11/04/24 0917   Dressing Changed New 11/03/24 2000   Dressing Status Clean;Dry 11/04/24 0917       Wound 10/30/24 Pressure Injury Heel Right (Active)   Wound Image   10/30/24 1218   Site Assessment Black 11/04/24 0409   Batsheva-Wound Assessment Clean;Dry 11/04/24 0409   Pressure Injury Stage DTPI 11/03/24 2000   Wound Length (cm) 1.2 cm 10/30/24 1218   Wound Width (cm) 1 cm 10/30/24 1218   Wound Surface Area (cm^2) 1.2 cm^2 10/30/24 1218   Drainage Amount None 11/02/24 0400   Dressing Foam 11/04/24 0917   Dressing Changed Changed 10/30/24 1230   Dressing Status Clean;Dry 11/04/24 2943       Wound Team Summary Assessment: Did not see pt but spoke to her nurse. She said they just cleaned pt and applied a combination of Triad cream and Moisture barrier to buttocks/ sacral area. Nurse said skin was red. Rt heel appears  to be stable blood filled blister.     Wound Team Plan: Continue with cream to buttocks twice daily and PRN for soilage. Mepilex to rt heel, change every 3 days.     Aretha Wise RN  11/4/2024  3:19 PM

## 2024-11-04 NOTE — DOCUMENTATION CLARIFICATION NOTE
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    Question: Please further clarify the diagnosis of Lupus cerebritis as    When answering this query, please exercise your independent professional judgment. The fact that a question is being asked, does not imply that any particular answer is desired or expected.    The patient's clinical indicators include:  Clinical Information:  63F presents for AMS, poor intake and hypothermia; found to have adrenal crisis    Clinical Indicators:  - ED, 10/29, Eliud: \"inability tolerate p.o. for the past several weeks...patient altered but is mentating and answering questions appropriately.\"  - Med, 10/30, Destinee: \"Patient quite sleepy...past medical history of lupus complicated by cerebritis (on CellCept and prednisone)...admitted for altered mental status and poor oral intake and hypothermia...Lupus with complicated lupus cerebritis\"  - GI, 10/30,Sneha: \"He states she's typically calm, relaxed and compliant.  He states that she has been having episodes of lupus psychosis, and hallucinating...Reports of her pulling Corpaks out were related to lupus psychosis per her son and typically she's pretty calm and compliant.\"    Treatment:  Hydrocortisone IV (10/29-10/31), IVF    Risk Factors:  SLE c/b cerebritis on MMF  Options provided:  -- Hx of Cerebritis 2/2 Lupus  -- Cerebritis 2/2 Lupus ruled in for this admission  -- Other - I will add my own diagnosis  -- Refer to " Clinical Documentation Reviewer    Query created by: Anthony Jaime on 10/31/2024 2:14 PM      Electronically signed by:  GURINDER SOTO MD 11/4/2024 7:25 AM

## 2024-11-04 NOTE — PROGRESS NOTES
Spiritual Care Visit    Clinical Encounter Type  Visited With: Patient and family together  Routine Visit: Introduction  Continue Visiting: No                                            Taxonomy  Intended Effects: Promote sense of peace, Preserve dignity and respect, Meaning-making  Methods: Offer spiritual/Judaism support  Interventions: Share words of hope and inspiration, [place]     visited the patient and her son and sister.  Patient did not communicate.   spoke of previous visits and the good connection the  has had with the patient.   encouraged the patient and her family in their dilan and spoke words of comfort and prayed with them.

## 2024-11-04 NOTE — PROGRESS NOTES
Bing Holliday is a 63 y.o. female on day 6 of admission presenting with Hypothermia, initial encounter.      Subjective   Patient lethargic this AM. She did open her eyes to her name but was unable to communicate meaningfully.   No further fever overnight.    Await hospice meeting.     Objective     Last Recorded Vitals  /87 (BP Location: Right arm, Patient Position: Lying)   Pulse 98   Temp 36.6 °C (97.9 °F) (Bladder)   Resp 22   Wt 79.1 kg (174 lb 6.1 oz)   SpO2 92%   Intake/Output last 3 Shifts:    Intake/Output Summary (Last 24 hours) at 11/4/2024 1108  Last data filed at 11/4/2024 1014  Gross per 24 hour   Intake 300 ml   Output 1100 ml   Net -800 ml       Admission Weight  Weight: 79.4 kg (175 lb) (10/29/24 1819)    Daily Weight  11/03/24 : 79.1 kg (174 lb 6.1 oz)    Physical exam:   Gen: lethargic, opens eyes to name  HEENT: MMM, EOMI, no scleral icterus  CV: irregularly irregular, no murmurs   Lungs: good air entry b/l anteriorly and laterally, no wheezing or rhonchi noted, coughing throughout exam, tachypneic  Abd: soft, nontender, nondistended, normoactive BS, corpak in place  Ext: WWP, no edema  Skin: no rashes/lesions  Neuro: lethargic, opens eyes to name     Results for orders placed or performed during the hospital encounter of 10/29/24 (from the past 24 hours)   POCT GLUCOSE   Result Value Ref Range    POCT Glucose 314 (H) 74 - 99 mg/dL   POCT GLUCOSE   Result Value Ref Range    POCT Glucose 260 (H) 74 - 99 mg/dL   POCT GLUCOSE   Result Value Ref Range    POCT Glucose 316 (H) 74 - 99 mg/dL   CBC   Result Value Ref Range    WBC 12.5 (H) 4.4 - 11.3 x10*3/uL    nRBC 1.4 (H) 0.0 - 0.0 /100 WBCs    RBC 2.84 (L) 4.00 - 5.20 x10*6/uL    Hemoglobin 8.1 (L) 12.0 - 16.0 g/dL    Hematocrit 28.2 (L) 36.0 - 46.0 %    MCV 99 80 - 100 fL    MCH 28.5 26.0 - 34.0 pg    MCHC 28.7 (L) 32.0 - 36.0 g/dL    RDW 21.2 (H) 11.5 - 14.5 %    Platelets 71 (L) 150 - 450 x10*3/uL   Renal function panel   Result Value  Ref Range    Glucose 308 (H) 74 - 99 mg/dL    Sodium 139 136 - 145 mmol/L    Potassium 4.1 3.5 - 5.3 mmol/L    Chloride 105 98 - 107 mmol/L    Bicarbonate 26 21 - 32 mmol/L    Anion Gap 12 10 - 20 mmol/L    Urea Nitrogen 30 (H) 6 - 23 mg/dL    Creatinine 1.37 (H) 0.50 - 1.05 mg/dL    eGFR 44 (L) >60 mL/min/1.73m*2    Calcium 8.4 (L) 8.6 - 10.3 mg/dL    Phosphorus 3.0 2.5 - 4.9 mg/dL    Albumin 3.0 (L) 3.4 - 5.0 g/dL   Magnesium   Result Value Ref Range    Magnesium 1.95 1.60 - 2.40 mg/dL   POCT GLUCOSE   Result Value Ref Range    POCT Glucose 279 (H) 74 - 99 mg/dL     *Note: Due to a large number of results and/or encounters for the requested time period, some results have not been displayed. A complete set of results can be found in Results Review.       XR chest 1 view    Result Date: 11/4/2024  Interpreted By:  Roni Dwyer, STUDY: XR CHEST 1 VIEW;  11/2/2024 7:31 pm   INDICATION: Signs/Symptoms:fever, eval for PNA.   COMPARISON: 10/29/2024   ACCESSION NUMBER(S): [id number]   ORDERING CLINICIAN: YULIANA BARRETO   FINDINGS: Feeding tube has been placed, tip not imaged. Pacemaker still present. No focal infiltrate. Cardiomegaly. Pulmonary vascular congestion appears slightly decreased. No apparent pleural effusion.       Pulmonary vascular congestion appears decreased compared to the prior exam.   MACRO: None   Signed by: Roni Dwyer 11/4/2024 8:46 AM Dictation workstation:   [id number]     Scheduled medications  atorvastatin, 40 mg, oral, Nightly  budesonide, 0.5 mg, nebulization, BID  heparin (porcine), 5,000 Units, subcutaneous, q8h  [Held by provider] hydrocortisone, 10 mg, oral, q PM  [Held by provider] hydrocortisone, 20 mg, oral, q AM  hydrocortisone sodium succinate, 50 mg, intravenous, q8h  insulin glargine, 5 Units, subcutaneous, Nightly  insulin regular, 0-10 Units, subcutaneous, q6h  insulin regular, 2 Units, subcutaneous, q6h  levETIRAcetam, 500 mg, nasogastric tube, BID  melatonin, 5 mg, oral,  Nightly  metoprolol tartrate, 25 mg, nasogastric tube, BID  mycophenolate, 1,000 mg, oral, BID  sertraline, 25 mg, oral, Daily      Continuous medications     PRN medications  PRN medications: acetaminophen, dextrose, dextrose, glucagon, glucagon, ipratropium-albuteroL, oxygen      Assessment/Plan   Ms Holliday is a 62yo woman with SLE with multiple complications, adrenal insufficiency c/b multiple hospitalizations for adrenal crisis, CKDIII, HRmrEF, CVA, COPD, afib not on AC d/t pancytopenia, dysphagia admitted for hypotension in setting of inability to take steroids at SNF. She was started on stress dose steroids in ER, which had been weaned to 25mg TID 10/31, however steroid dose increased to 50 mg TID 11/1 due to worsening lethargy. GI consulted for PEG placement in the setting of enteral support/med needs, however she is not a candidate for PEG due to comorbidities. Febrile overnight 11/2, infectious workup including blood cx, C diff, UA/Ucx pending.      I discussed clinical status with son López 11/3. López agreeable to meeting with palliative care and hospice. Hospice consult placed. Await results of meeting.      #fever  -fever 11/2 PM - has been afebrile for 24h  -blood cx ordered and pending  -change ruano, repeat UA/Ucx  -CXR with no obvious infiltrate - read with decreased congestion from prior   -no s/sx SSTI  -hold off on empiric abx pending data  -will continue high dose steroids while infectious workup pending given prior adrenal crisis/septic shock with infections requiring stress does steroids. May decrease tomorrow pending [facility]     #SLE  #adrenal insufficiency  #encephalopathy  -Steroids increased to 50mg TID 11/1. Will continue 50mg TID pending [facility]   -Home dose is 20mg qAM, 10mg qPM  -corpak placed for med administration  -no longer requires jacqueline hugger  -continue cellcept   -hospice and palliative care consults placed.      #dysphagia  -historically due to encephaloapthy, waxes and wanes  -speech  following  -corpak  -continue TF  -not candidate for PEG per GI consult     #HFmrEF  #COPD  #DMII  #CKD  #seizure d/o   -continue ICS, duonebs  -continue statin   -continue keppra      FEN/GI: TF, speech following  Access: PIV  Ppx: GERALD  Code: DNAR/DNI. Hospice consult placed.      Dispo: pending hospice meeting.             Malnutrition Diagnosis Status: New  Malnutrition Diagnosis: Severe malnutrition related to chronic disease or condition  As Evidenced by: recurrent adrenal crisis with inconsistent oral intakes >1 month resulting in 6% weight loss x1 month, 15% x3 months and 12% x6 months and severe fat and muscle mass wasting (Pt EN dependent 10/3-approximately 10/26/2024 when pt reportedly pulled out corpak.)  I agree with the dietitian's malnutrition diagnosis.        This patient has a urinary catheter              Reason for the urinary catheter remaining today? urinary retention/bladder outlet obstruction, acute or chronic            Yolanda Moreno MD

## 2024-11-05 LAB
ALBUMIN SERPL BCP-MCNC: 2.9 G/DL (ref 3.4–5)
ANION GAP SERPL CALC-SCNC: 10 MMOL/L (ref 10–20)
BUN SERPL-MCNC: 32 MG/DL (ref 6–23)
CALCIUM SERPL-MCNC: 8.4 MG/DL (ref 8.6–10.3)
CHLORIDE SERPL-SCNC: 107 MMOL/L (ref 98–107)
CO2 SERPL-SCNC: 28 MMOL/L (ref 21–32)
CREAT SERPL-MCNC: 1.13 MG/DL (ref 0.5–1.05)
EGFRCR SERPLBLD CKD-EPI 2021: 55 ML/MIN/1.73M*2
ERYTHROCYTE [DISTWIDTH] IN BLOOD BY AUTOMATED COUNT: 21.1 % (ref 11.5–14.5)
GLUCOSE BLD MANUAL STRIP-MCNC: 151 MG/DL (ref 74–99)
GLUCOSE BLD MANUAL STRIP-MCNC: 197 MG/DL (ref 74–99)
GLUCOSE BLD MANUAL STRIP-MCNC: 201 MG/DL (ref 74–99)
GLUCOSE BLD MANUAL STRIP-MCNC: 211 MG/DL (ref 74–99)
GLUCOSE BLD MANUAL STRIP-MCNC: 229 MG/DL (ref 74–99)
GLUCOSE BLD MANUAL STRIP-MCNC: 245 MG/DL (ref 74–99)
GLUCOSE BLD MANUAL STRIP-MCNC: 265 MG/DL (ref 74–99)
GLUCOSE BLD MANUAL STRIP-MCNC: 298 MG/DL (ref 74–99)
GLUCOSE SERPL-MCNC: 253 MG/DL (ref 74–99)
HCT VFR BLD AUTO: 26.4 % (ref 36–46)
HGB BLD-MCNC: 7.8 G/DL (ref 12–16)
MCH RBC QN AUTO: 28.8 PG (ref 26–34)
MCHC RBC AUTO-ENTMCNC: 29.5 G/DL (ref 32–36)
MCV RBC AUTO: 97 FL (ref 80–100)
NRBC BLD-RTO: 1 /100 WBCS (ref 0–0)
PHOSPHATE SERPL-MCNC: 2.4 MG/DL (ref 2.5–4.9)
PLATELET # BLD AUTO: 85 X10*3/UL (ref 150–450)
POTASSIUM SERPL-SCNC: 4.3 MMOL/L (ref 3.5–5.3)
RBC # BLD AUTO: 2.71 X10*6/UL (ref 4–5.2)
SODIUM SERPL-SCNC: 141 MMOL/L (ref 136–145)
WBC # BLD AUTO: 12.9 X10*3/UL (ref 4.4–11.3)

## 2024-11-05 PROCEDURE — 85027 COMPLETE CBC AUTOMATED: CPT | Performed by: STUDENT IN AN ORGANIZED HEALTH CARE EDUCATION/TRAINING PROGRAM

## 2024-11-05 PROCEDURE — 99232 SBSQ HOSP IP/OBS MODERATE 35: CPT | Performed by: STUDENT IN AN ORGANIZED HEALTH CARE EDUCATION/TRAINING PROGRAM

## 2024-11-05 PROCEDURE — 2500000001 HC RX 250 WO HCPCS SELF ADMINISTERED DRUGS (ALT 637 FOR MEDICARE OP): Performed by: STUDENT IN AN ORGANIZED HEALTH CARE EDUCATION/TRAINING PROGRAM

## 2024-11-05 PROCEDURE — 94640 AIRWAY INHALATION TREATMENT: CPT

## 2024-11-05 PROCEDURE — 2500000002 HC RX 250 W HCPCS SELF ADMINISTERED DRUGS (ALT 637 FOR MEDICARE OP, ALT 636 FOR OP/ED): Performed by: INTERNAL MEDICINE

## 2024-11-05 PROCEDURE — 2500000002 HC RX 250 W HCPCS SELF ADMINISTERED DRUGS (ALT 637 FOR MEDICARE OP, ALT 636 FOR OP/ED): Performed by: STUDENT IN AN ORGANIZED HEALTH CARE EDUCATION/TRAINING PROGRAM

## 2024-11-05 PROCEDURE — 2500000001 HC RX 250 WO HCPCS SELF ADMINISTERED DRUGS (ALT 637 FOR MEDICARE OP): Performed by: INTERNAL MEDICINE

## 2024-11-05 PROCEDURE — 1100000001 HC PRIVATE ROOM DAILY

## 2024-11-05 PROCEDURE — 84100 ASSAY OF PHOSPHORUS: CPT | Performed by: STUDENT IN AN ORGANIZED HEALTH CARE EDUCATION/TRAINING PROGRAM

## 2024-11-05 PROCEDURE — 36415 COLL VENOUS BLD VENIPUNCTURE: CPT | Performed by: STUDENT IN AN ORGANIZED HEALTH CARE EDUCATION/TRAINING PROGRAM

## 2024-11-05 PROCEDURE — 82947 ASSAY GLUCOSE BLOOD QUANT: CPT

## 2024-11-05 PROCEDURE — 2500000004 HC RX 250 GENERAL PHARMACY W/ HCPCS (ALT 636 FOR OP/ED): Performed by: INTERNAL MEDICINE

## 2024-11-05 PROCEDURE — 2500000004 HC RX 250 GENERAL PHARMACY W/ HCPCS (ALT 636 FOR OP/ED): Performed by: STUDENT IN AN ORGANIZED HEALTH CARE EDUCATION/TRAINING PROGRAM

## 2024-11-05 RX ORDER — INSULIN GLARGINE 100 [IU]/ML
8 INJECTION, SOLUTION SUBCUTANEOUS NIGHTLY
Status: DISCONTINUED | OUTPATIENT
Start: 2024-11-05 | End: 2024-11-06 | Stop reason: HOSPADM

## 2024-11-05 ASSESSMENT — COGNITIVE AND FUNCTIONAL STATUS - GENERAL
MOBILITY SCORE: 6
TURNING FROM BACK TO SIDE WHILE IN FLAT BAD: TOTAL
STANDING UP FROM CHAIR USING ARMS: TOTAL
CLIMB 3 TO 5 STEPS WITH RAILING: TOTAL
WALKING IN HOSPITAL ROOM: TOTAL
MOVING TO AND FROM BED TO CHAIR: TOTAL
MOVING FROM LYING ON BACK TO SITTING ON SIDE OF FLAT BED WITH BEDRAILS: TOTAL

## 2024-11-05 ASSESSMENT — PAIN SCALES - GENERAL
PAINLEVEL_OUTOF10: 1
PAINLEVEL_OUTOF10: 7

## 2024-11-05 ASSESSMENT — PAIN DESCRIPTION - LOCATION: LOCATION: BACK

## 2024-11-05 NOTE — NURSING NOTE
Spoke with son López this morning. He has a phone call meeting with [facility] hosp[facility] at 430p. He is concerned that pt may have to be moved out of [facility] at some point and felt like that may be too much transferring. We talked more about it he is leaning towards [facility] and will remeet with [facility], plans to also keep his meeting with [facility] today.     Palliative will continue to follow for plan of care

## 2024-11-05 NOTE — NURSING NOTE
0835 pt receiving breathing tx.  1445 pt cleaned d/t fecal system failure. Re-inserted fecal tube. Pt repositioned for safety and comfort.   1545 pt son at bedside.  1813 notified Dr Moreno pt yelling out in pain when trying to reposition. Requested pain medication.

## 2024-11-05 NOTE — CARE PLAN
The patient's goals for the shift include      The clinical goals for the shift include Patient will be free of aspiration throughout shift.      Problem: Nutrition  Goal: Less than 5 days NPO/clear liquids  Outcome: Progressing  Goal: Oral intake greater than 50%  Outcome: Progressing  Goal: Consume prescribed supplement  Outcome: Progressing  Goal: Nutrition support goals are met within 48 hrs  Outcome: Progressing  Goal: Nutrition support is meeting 75% of nutrient needs  Outcome: Progressing  Goal: Tube feed tolerance  Outcome: Progressing  Goal: Lab values WNL  Outcome: Progressing  Goal: Electrolytes WNL  Outcome: Progressing  Goal: Promote healing  Outcome: Progressing  Goal: Maintain stable weight  Outcome: Progressing  Goal: Reduce weight from edema/fluid  Outcome: Progressing  Goal: Gradual weight gain  Outcome: Progressing  Goal: BG  mg/dL  Outcome: Progressing     Problem: Pain - Adult  Goal: Verbalizes/displays adequate comfort level or baseline comfort level  Outcome: Progressing     Problem: Safety - Adult  Goal: Free from fall injury  Outcome: Progressing     Problem: Discharge Planning  Goal: Discharge to home or other facility with appropriate resources  Outcome: Progressing     Problem: Chronic Conditions and Co-morbidities  Goal: Patient's chronic conditions and co-morbidity symptoms are monitored and maintained or improved  Outcome: Progressing     Problem: Fall/Injury  Goal: Not fall by end of shift  Outcome: Progressing  Goal: Be free from injury by end of the shift  Outcome: Progressing  Goal: Verbalize understanding of personal risk factors for fall in the hospital  Outcome: Progressing  Goal: Verbalize understanding of risk factor reduction measures to prevent injury from fall in the home  Outcome: Progressing  Goal: Use assistive devices by end of the shift  Outcome: Progressing  Goal: Pace activities to prevent fatigue by end of the shift  Outcome: Progressing     Problem:  Diabetes  Goal: Achieve decreasing blood glucose levels by end of shift  Outcome: Progressing  Goal: Increase stability of blood glucose readings by end of shift  Outcome: Progressing  Goal: Decrease in ketones present in urine by end of shift  Outcome: Progressing  Goal: Maintain electrolyte levels within acceptable range throughout shift  Outcome: Progressing  Goal: Maintain glucose levels >70mg/dl to <250mg/dl throughout shift  Outcome: Progressing  Goal: No changes in neurological exam by end of shift  Outcome: Progressing  Goal: Learn about and adhere to nutrition recommendations by end of shift  Outcome: Progressing  Goal: Vital signs within normal range for age by end of shift  Outcome: Progressing  Goal: Increase self care and/or family involovement by end of shift  Outcome: Progressing  Goal: Receive DSME education by end of shift  Outcome: Progressing

## 2024-11-05 NOTE — NURSING NOTE
Patient received in transfer from [facility] 2108 to 3011. Patient tube feed maintained. Assessment and vitals maintained. Patient alert to self. Patient transferred to speciality [facility].

## 2024-11-05 NOTE — PROGRESS NOTES
Bing Holliday is a 63 y.o. female on day 7 of admission presenting with Hypothermia, initial encounter.      Subjective   Patient more alert this AM. She is able to mouth responses to words. She denies pain, dyspnea.        Objective     Last Recorded Vitals  BP (!) 149/98 Comment: rn notified  Pulse 88   Temp 36.1 °C (97 °F) (Temporal)   Resp 20   Wt 84.4 kg (186 lb 1.1 oz)   SpO2 99%   Intake/Output last 3 Shifts:    Intake/Output Summary (Last 24 hours) at 11/5/2024 1314  Last data filed at 11/5/2024 0400  Gross per 24 hour   Intake --   Output 975 ml   Net -975 ml       Admission Weight  Weight: 79.4 kg (175 lb) (10/29/24 1819)    Daily Weight  11/04/24 : 84.4 kg (186 lb 1.1 oz)      Physical Exam  Gen: lethargic, more conversant today  HEENT: MMM, EOMI, no scleral icterus  CV: irregularly irregular, no murmurs   Lungs: good air entry b/l anteriorly and laterally, no wheezing or rhonchi noted, accessory muscle use but otherwise no respiratory distress  Abd: soft, nontender, nondistended, normoactive BS, corpak in place  Ext: WWP, no edema  Skin: no rashes/lesions  Neuro: lethargic but able respond to simple questions and follow simple commands    Relevant Results  Results for orders placed or performed during the hospital encounter of 10/29/24 (from the past 24 hours)   POCT GLUCOSE   Result Value Ref Range    POCT Glucose 328 (H) 74 - 99 mg/dL   POCT GLUCOSE   Result Value Ref Range    POCT Glucose 298 (H) 74 - 99 mg/dL   POCT GLUCOSE   Result Value Ref Range    POCT Glucose 265 (H) 74 - 99 mg/dL   POCT GLUCOSE   Result Value Ref Range    POCT Glucose 245 (H) 74 - 99 mg/dL   CBC   Result Value Ref Range    WBC 12.9 (H) 4.4 - 11.3 x10*3/uL    nRBC 1.0 (H) 0.0 - 0.0 /100 WBCs    RBC 2.71 (L) 4.00 - 5.20 x10*6/uL    Hemoglobin 7.8 (L) 12.0 - 16.0 g/dL    Hematocrit 26.4 (L) 36.0 - 46.0 %    MCV 97 80 - 100 fL    MCH 28.8 26.0 - 34.0 pg    MCHC 29.5 (L) 32.0 - 36.0 g/dL    RDW 21.1 (H) 11.5 - 14.5 %     Platelets 85 (L) 150 - 450 x10*3/uL   Renal function panel   Result Value Ref Range    Glucose 253 (H) 74 - 99 mg/dL    Sodium 141 136 - 145 mmol/L    Potassium 4.3 3.5 - 5.3 mmol/L    Chloride 107 98 - 107 mmol/L    Bicarbonate 28 21 - 32 mmol/L    Anion Gap 10 10 - 20 mmol/L    Urea Nitrogen 32 (H) 6 - 23 mg/dL    Creatinine 1.13 (H) 0.50 - 1.05 mg/dL    eGFR 55 (L) >60 mL/min/1.73m*2    Calcium 8.4 (L) 8.6 - 10.3 mg/dL    Phosphorus 2.4 (L) 2.5 - 4.9 mg/dL    Albumin 2.9 (L) 3.4 - 5.0 g/dL   POCT GLUCOSE   Result Value Ref Range    POCT Glucose 197 (H) 74 - 99 mg/dL     *Note: Due to a large number of results and/or encounters for the requested time period, some results have not been displayed. A complete set of results can be found in Results Review.       No results found.    Scheduled medications  atorvastatin, 40 mg, oral, Nightly  budesonide, 0.5 mg, nebulization, BID  heparin (porcine), 5,000 Units, subcutaneous, q8h  [Held by provider] hydrocortisone, 10 mg, oral, q PM  [Held by provider] hydrocortisone, 20 mg, oral, q AM  hydrocortisone sodium succinate, 25 mg, intravenous, q8h  insulin glargine, 8 Units, subcutaneous, Nightly  insulin regular, 0-10 Units, subcutaneous, q6h  insulin regular, 3 Units, subcutaneous, q6h  levETIRAcetam, 500 mg, nasogastric tube, BID  melatonin, 5 mg, oral, Nightly  metoprolol tartrate, 25 mg, nasogastric tube, BID  mycophenolate, 1,000 mg, oral, BID  sertraline, 25 mg, oral, Daily      Continuous medications     PRN medications  PRN medications: acetaminophen, dextrose, dextrose, glucagon, glucagon, ipratropium-albuteroL, oxygen         Assessment/Plan    Ms Holliday is a 64yo woman with SLE with multiple complications, adrenal insufficiency c/b multiple hospitalizations for adrenal crisis, CKDIII, HRmrEF, CVA, COPD, afib not on AC d/t pancytopenia, dysphagia admitted for hypotension in setting of inability to take steroids at SNF. She was started on stress dose steroids in ER,  which had been weaned to 25mg TID 10/31, however steroid dose increased to 50 mg TID 11/1 due to worsening lethargy. GI consulted for PEG placement in the setting of enteral support/med needs, however she is not a candidate for PEG due to comorbidities. Patient more alert today, OK to wean steroids to 25mg TID.      Son López met with hospice 11/4, but needs time to consider. I spoke with López 11/4 and again recommended hospice care. Await results of hospice meeting today.      #SLE  #adrenal insufficiency  #encephalopathy  -steroids decreased to 25mg TID given improvement in mental status. Can likely switch to home dose tomorrow.   -Home dose is 20mg qAM, 10mg qPM  -corpak placed for med administration  -continue cellcept   -hospice and palliative care consults placed.      #dysphagia  -historically due to encephaloapthy, waxes and wanes  -speech following  -corpak  -continue TF  -not candidate for PEG per GI consult     #HFmrEF  #COPD  #DMII  #CKD  #seizure d/o   -continue ICS, duonebs  -continue statin   -continue keppra      FEN/GI: TF, speech following  Access: PIV  Ppx: GERALD  Code: DNAR/DNI.     Dispo: pending hospice meeting.             Malnutrition Diagnosis Status: New  Malnutrition Diagnosis: Severe malnutrition related to chronic disease or condition  As Evidenced by: recurrent adrenal crisis with inconsistent oral intakes >1 month resulting in 6% weight loss x1 month, 15% x3 months and 12% x6 months and severe fat and muscle mass wasting (Pt EN dependent 10/3-approximately 10/26/2024 when pt reportedly pulled out corpak.)  I agree with the dietitian's malnutrition diagnosis.        This patient has a urinary catheter              Reason for the urinary catheter remaining today? urinary retention/bladder outlet obstruction, acute or chronic       Yolanda Moreno MD

## 2024-11-06 ENCOUNTER — APPOINTMENT (OUTPATIENT)
Dept: RADIOLOGY | Facility: HOSPITAL | Age: 64
End: 2024-11-06
Payer: MEDICARE

## 2024-11-06 VITALS
HEIGHT: 61 IN | RESPIRATION RATE: 17 BRPM | OXYGEN SATURATION: 96 % | WEIGHT: 186.07 LBS | SYSTOLIC BLOOD PRESSURE: 121 MMHG | TEMPERATURE: 97 F | HEART RATE: 74 BPM | BODY MASS INDEX: 35.13 KG/M2 | DIASTOLIC BLOOD PRESSURE: 88 MMHG

## 2024-11-06 PROBLEM — E27.40 ADRENAL INSUFFICIENCY (MULTI): Status: RESOLVED | Noted: 2024-01-14 | Resolved: 2024-11-06

## 2024-11-06 PROBLEM — N30.00 ACUTE CYSTITIS WITHOUT HEMATURIA: Status: RESOLVED | Noted: 2023-10-19 | Resolved: 2024-11-06

## 2024-11-06 LAB
BACTERIA BLD CULT: NORMAL
BACTERIA BLD CULT: NORMAL
GLUCOSE BLD MANUAL STRIP-MCNC: 131 MG/DL (ref 74–99)
GLUCOSE BLD MANUAL STRIP-MCNC: 148 MG/DL (ref 74–99)

## 2024-11-06 PROCEDURE — 74018 RADEX ABDOMEN 1 VIEW: CPT

## 2024-11-06 PROCEDURE — 82947 ASSAY GLUCOSE BLOOD QUANT: CPT

## 2024-11-06 PROCEDURE — 2500000002 HC RX 250 W HCPCS SELF ADMINISTERED DRUGS (ALT 637 FOR MEDICARE OP, ALT 636 FOR OP/ED): Performed by: INTERNAL MEDICINE

## 2024-11-06 PROCEDURE — 92526 ORAL FUNCTION THERAPY: CPT | Mod: GN | Performed by: SPEECH-LANGUAGE PATHOLOGIST

## 2024-11-06 PROCEDURE — 2500000001 HC RX 250 WO HCPCS SELF ADMINISTERED DRUGS (ALT 637 FOR MEDICARE OP): Performed by: STUDENT IN AN ORGANIZED HEALTH CARE EDUCATION/TRAINING PROGRAM

## 2024-11-06 PROCEDURE — 74018 RADEX ABDOMEN 1 VIEW: CPT | Performed by: RADIOLOGY

## 2024-11-06 PROCEDURE — 51702 INSERT TEMP BLADDER CATH: CPT

## 2024-11-06 PROCEDURE — 2500000004 HC RX 250 GENERAL PHARMACY W/ HCPCS (ALT 636 FOR OP/ED): Performed by: STUDENT IN AN ORGANIZED HEALTH CARE EDUCATION/TRAINING PROGRAM

## 2024-11-06 PROCEDURE — 2500000004 HC RX 250 GENERAL PHARMACY W/ HCPCS (ALT 636 FOR OP/ED): Performed by: INTERNAL MEDICINE

## 2024-11-06 PROCEDURE — 99239 HOSP IP/OBS DSCHRG MGMT >30: CPT | Performed by: STUDENT IN AN ORGANIZED HEALTH CARE EDUCATION/TRAINING PROGRAM

## 2024-11-06 ASSESSMENT — COGNITIVE AND FUNCTIONAL STATUS - GENERAL
EATING MEALS: TOTAL
DRESSING REGULAR UPPER BODY CLOTHING: TOTAL
TOILETING: TOTAL
MOVING TO AND FROM BED TO CHAIR: TOTAL
STANDING UP FROM CHAIR USING ARMS: TOTAL
DRESSING REGULAR LOWER BODY CLOTHING: TOTAL
MOVING FROM LYING ON BACK TO SITTING ON SIDE OF FLAT BED WITH BEDRAILS: TOTAL
HELP NEEDED FOR BATHING: TOTAL
CLIMB 3 TO 5 STEPS WITH RAILING: TOTAL
TURNING FROM BACK TO SIDE WHILE IN FLAT BAD: TOTAL
MOBILITY SCORE: 6
DAILY ACTIVITIY SCORE: 6
PERSONAL GROOMING: TOTAL
WALKING IN HOSPITAL ROOM: TOTAL

## 2024-11-06 ASSESSMENT — PAIN SCALES - GENERAL
PAINLEVEL_OUTOF10: 4
PAINLEVEL_OUTOF10: 8
PAINLEVEL_OUTOF10: 3

## 2024-11-06 NOTE — DISCHARGE SUMMARY
"Discharge Diagnosis  Hypothermia, initial encounter    Issues Requiring Follow-Up  Hospice care     Discharge Meds     Medication List      CONTINUE taking these medications     Dexcom G6  misc; Generic drug: blood-glucose meter,continuous;   Use as instructed   Dexcom G6 Sensor device; Generic drug: blood-glucose sensor; Use to   check sugars 3 times daily   Dexcom G6 Transmitter device; Generic drug: blood-glucose transmitter   device; Use as instructed   * hydrocortisone 10 mg tablet; Commonly known as: Cortef; Take 1 tablet   (10 mg) by mouth once daily in the evening.   * hydrocortisone 20 mg tablet; Commonly known as: Cortef; Take 1 tablet   (20 mg) by mouth once daily in the morning.   insulin syringe,safety needle 29G X 1/2\" 0.5 mL syringe; Use to inject   1-4 times daily.   ipratropium-albuteroL 0.5-2.5 mg/3 mL nebulizer solution; Commonly known   as: Duo-Neb; Take 3 mL by nebulization every 4 hours if needed for   wheezing or shortness of breath.   lidocaine 4 % patch   pantoprazole 40 mg EC tablet; Commonly known as: ProtoNix; Take 1 tablet   (40 mg) by mouth once daily in the morning. Take before meals. Do not   crush, chew, or split.   pen needle, diabetic 31 gauge x 5/16\" needle; Use to inject 1-4 times   daily as directed.   polyethylene glycol 17 gram packet; Commonly known as: Glycolax,   Miralax; Take 17 g by mouth once daily.   sertraline 25 mg tablet; Commonly known as: Zoloft  * This list has 2 medication(s) that are the same as other medications   prescribed for you. Read the directions carefully, and ask your doctor or   other care provider to review them with you.     STOP taking these medications     atorvastatin 40 mg tablet; Commonly known as: Lipitor   budesonide 0.5 mg/2 mL nebulizer solution; Commonly known as: Pulmicort   heparin (porcine) 5,000 unit/mL injection   insulin lispro 100 unit/mL injection   levETIRAcetam 100 mg/mL solution; Commonly known as: Keppra   levETIRAcetam " 500 mg tablet; Commonly known as: Keppra   melatonin 5 mg tablet   metoprolol tartrate 25 mg tablet; Commonly known as: Lopressor   mycophenolate 250 mg capsule; Commonly known as: Cellcept   wound dressings paste     ASK your doctor about these medications     acetaminophen 325 mg tablet; Commonly known as: Tylenol       Test Results Pending At Discharge  Pending Labs       Order Current Status    Blood Culture Preliminary result    Blood Culture Preliminary result            Hospital Course  Ms Holliday is a 62yo woman with longstanding 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 She was started on stress dose steroids in ER. ID consutled given recent ESBL Klebsiella infection, however Ucx with no significant growth so abx discontinued. GI consulted for PEG placement in the setting of enteral support/med needs, however she is not a candidate for PEG due to comorbidities and immunosuppression.  Patient failed to make significant improvements despite high dose steroids and close care. GOC disucssed with son, who had previously been considering hospice. He was ultimately agreeable to meeting with hospice. Patient transferred to [place] under hospice care.     Over 30 minutes was spent in discharge planning, >50% of which was spent in direct patient counseling.       Pertinent Physical Exam At Time of Discharge  Physical Exam  Gen: lethargic, arousable to voice and answers questions  HEENT: MMM, EOMI, no scleral icterus  CV: irregularly irregular, no murmurs   Lungs: good air entry b/l anteriorly and laterally, no wheezing or rhonchi noted  Abd: soft, nontender, nondistended, normoactive BS, corpak in place, FMS in place with no significant stool  output  Ext: WWP, no edema  Skin: no rashes/lesions  Neuro: lethargic but able respond to simple questions and follow simple commands    Outpatient Follow-Up  No future appointments.      Yolanda Moreno MD

## 2024-11-06 NOTE — NURSING NOTE
Turns, patient care, and safety ongoing. No observed discomfort at this time. FMS [place]ano and air bed inspected and WNL.

## 2024-11-06 NOTE — PROGRESS NOTES
Bing Holliday is a 63 y.o. female on day 8 of admission presenting with Hypothermia, initial encounter.      Subjective   No acute events overnight.   Patient is arousable to voice this AM and is oriented to self and place.   She endorses back pain. Denies dyspnea.     Family meeting to discuss hospice at 2pm today. Possible transfer to [place].        Objective     Last Recorded Vitals  /88 (BP Location: Right arm, Patient Position: Lying)   Pulse 62   Temp 36.1 °C (97 °F) (Temporal)   Resp 16   Wt 84.4 kg (186 lb 1.1 oz)   SpO2 98%   Intake/Output last 3 Shifts:    Intake/Output Summary (Last 24 hours) at 11/6/2024 1039  Last data filed at 11/6/2024 0914  Gross per 24 hour   Intake --   Output 2125 ml   Net -2125 ml       Admission Weight  Weight: 79.4 kg (175 lb) (10/29/24 1819)    Daily Weight  11/04/24 : 84.4 kg (186 lb 1.1 oz)      Physical Exam  Gen: lethargic, arousable to voice and answers questions  HEENT: MMM, EOMI, no scleral icterus  CV: irregularly irregular, no murmurs   Lungs: good air entry b/l anteriorly and laterally, no wheezing or rhonchi noted  Abd: soft, nontender, nondistended, normoactive BS, corpak in place, FMS in place with no significant stool output  Ext: WWP, no edema  Skin: no rashes/lesions  Neuro: lethargic but able respond to simple questions and follow simple commands      Relevant Results  Results for orders placed or performed during the hospital encounter of 10/29/24 (from the past 24 hours)   POCT GLUCOSE   Result Value Ref Range    POCT Glucose 197 (H) 74 - 99 mg/dL   POCT GLUCOSE   Result Value Ref Range    POCT Glucose 151 (H) 74 - 99 mg/dL   POCT GLUCOSE   Result Value Ref Range    POCT Glucose 201 (H) 74 - 99 mg/dL   POCT GLUCOSE   Result Value Ref Range    POCT Glucose 211 (H) 74 - 99 mg/dL   POCT GLUCOSE   Result Value Ref Range    POCT Glucose 229 (H) 74 - 99 mg/dL     *Note: Due to a large number of results and/or encounters for the requested time period,  some results have not been displayed. A complete set of results can be found in Results Review.       No results found.    Scheduled medications  atorvastatin, 40 mg, oral, Nightly  budesonide, 0.5 mg, nebulization, BID  heparin (porcine), 5,000 Units, subcutaneous, q8h  hydrocortisone, 10 mg, oral, q PM  hydrocortisone, 20 mg, oral, q AM  insulin glargine, 8 Units, subcutaneous, Nightly  insulin regular, 0-10 Units, subcutaneous, q6h  insulin regular, 3 Units, subcutaneous, q6h  levETIRAcetam, 500 mg, nasogastric tube, BID  melatonin, 5 mg, oral, Nightly  metoprolol tartrate, 25 mg, nasogastric tube, BID  mycophenolate, 1,000 mg, oral, BID  sertraline, 25 mg, oral, Daily      Continuous medications     PRN medications  PRN medications: acetaminophen, dextrose, dextrose, glucagon, glucagon, ipratropium-albuteroL, oxygen      Assessment/Plan    Ms Holliday is a 62yo woman with SLE with multiple complications, adrenal insufficiency c/b multiple hospitalizations for adrenal crisis, CKDIII, HRmrEF, CVA, COPD, afib not on AC d/t pancytopenia, dysphagia admitted for hypotension in setting of inability to take steroids at SNF. She was started on stress dose steroids in ER, which had been weaned to 25mg TID 10/31, however steroid dose increased to 50 mg TID 11/1 due to worsening lethargy. GI consulted for PEG placement in the setting of enteral support/med needs, however she is not a candidate for PEG due to comorbidities. Patient remains arousable and alert, OK to transition to home dose hydrocortisone today.      Plan to meet with norberto Dawn today with hospice at 2pm. Possible transfer to [place].      #SLE  #adrenal insufficiency  #encephalopathy  -home dose steroids resumed: 20mg qAM, 10mg qPM  -corpak placed for med administration  -continue cellcept   -hospice and palliative care consults placed.      #dysphagia  -historically due to encephaloapthy, waxes and wanes  -speech following  -corpak  -continue TF  -not candidate for  PEG per GI consult     #HFmrEF  #COPD  #DMII  #CKD  #seizure d/o   -continue ICS, duonebs  -continue statin   -continue keppra      FEN/GI: TF, speech following  Access: PIV  Ppx: GERALD  Code: DNAR/DNI.     Dispo: pending hospice meeting.             Malnutrition Diagnosis Status: New  Malnutrition Diagnosis: Severe malnutrition related to chronic disease or condition  As Evidenced by: recurrent adrenal crisis with inconsistent oral intakes >1 month resulting in 6% weight loss x1 month, 15% x3 months and 12% x6 months and severe fat and muscle mass wasting (Pt EN dependent 10/3-approximately 10/26/2024 when pt reportedly pulled out corpak.)  I agree with the dietitian's malnutrition diagnosis.        This patient has a urinary catheter              Reason for the urinary catheter remaining today? urinary retention/bladder outlet obstruction, acute or chronic           MD Yolanda Reyes MD

## 2024-11-06 NOTE — PROGRESS NOTES
Speech-Language Pathology    SLP Adult Inpatient  Speech-Language Pathology Treatment     Patient Name: Bing Holliday  MRN: [id number]  Today's Date: 11/6/2024  Time Calculation  Start Time: 0957  Stop Time: 1029  Time Calculation (min): 32 min         Current Problem:   1. Hypothermia, initial encounter  Warming blanket      2. Adrenal crisis (Multi)        3. Severe protein-calorie malnutrition (Multi)              SLP Assessment:    SLP TX Intervention Outcome: Making Progress Towards Goals  SLP Assessment Results: Other (Comment) (Swallowing deficits)  Patient was seen this date for dysphagia therapy. Patient was able to understand directives and given minimal to moderate verbal/tactile cues, patient ws able to tolerate oral care and all PO trials. Patient allowed for her teeth to be brushed thoroughly. During PO trials, patient cleared her throat following ice chips and cup/straw sip of thin liquid trials. Patient noted an abnormal amount oh phlegm in the oral cavity recently. Patient was able to tolerate puree, soft and bite solids, and regular solids without over s/s of aspiration. Patient benefited from being talked through the procedure of each trial using tactile cue of hand under hand to guide the solid trials into her mouth. She also benefited from the food being put into her field of vision before eating it, so she knows what she is going to be eating. At the end of the session, RN was able to use hand under hand to assist the patient in taking liquid medication via syringe. Patient tolerated this well too, however patient cleared throat/coughtf many times once medication was swallowed.   Patient to initiate PO diet of soft and bite (IDDSI 6) and thin liquids (IDDSI 0), ONLY when fully awake/alert and with 1:1 assist.. ST to continue to follow during inpatient stay, and will continue to assess for diet advancement as alertness improves vs need for further assessment (I.e., MBSS).  Prognosis:  Fair  Treatment Tolerance: Patient tolerated treatment well  Medical Staff Made Aware: Yes  Strengths: Motivation  Barriers: Comorbidities  Education Provided: Yes       Plan:  Inpatient/Swing Bed or Outpatient: Inpatient  Treatment/Interventions: Other (Comment) (Swallowing)  SLP TX Plan: Continue Plan of Care  SLP Plan: Skilled SLP  SLP Frequency: 2x per week  Duration: 2 weeks  SLP Discharge Recommendations: Continue skilled SLP services at the next level of care  Next Treatment Priority: PO trials  Discussed POC: Patient, Nursing  Discussed Risks/Benefits: Yes, Patient, Nursing  Patient/Caregiver Agreeable: Yes      Subjective   Patient was alert and cooperative throughout the assessment. Patient allowed for thorough oral care to be done and completed the treatment session without difficulty. Patient talked about her children and grandchildren throughout the assessment as well.     Most Recent Visit:  SLP Received On: 11/06/24    General Visit Information:   Reason for Referral: Determine if MBSS is needed  Referred By: Darek Felipe MD  Past Medical History Relevant to Rehab: SLE, lupus cerebritis, RA, Raynaud's syndrome, hyperparathyroidism, adrenal insufficiency, COPD, DM, HTN, HLD, atrial fibrillation (not on anticoagulation), CKD, pacemaker, seizures, psychosis  Patient Seen During This Visit: Yes  Arrival: Independent  Caregiver Feedback: RN reports patient is awake, however resisting opening her mouth to take medications.  Total Number of Visits : 4  Prior to Session Communication: Bedside nurse      Pain Assessment:  Pain Assessment  Pain Assessment: 0-10  0-10 (Numeric) Pain Score: 3  Pain Location: Hand      Objective     Short Term Goals (established 10/30/2024):  1. Patient will tolerate PO with no overt s/s of aspiration in 90% of observed therapeutic trials.  Progressing.Patient tolerated PO trials of puree, soft and bite, and regular solids without over s/s of aspiration, however patient cleared  her throat after each trial of ice chips and thin liquids.    Previous:  - Because patient's ability to tolerate session was limited due to extent of waxing and waning alertness levels, only one bolus (ice chip) trial could be presented. Patient able to propel bolus adequately A-P and initiate a pharyngeal swallow without cues. Patient with no S/S dysphagia.     2. Patient/caregiver will implement safe swallowing strategies to reduce risk of aspiration in 90% of trials given caregiver assistance/cueing as needed.  Progressing. Patient was alert and upright, taking small sips/bites, tolerated oral care, and required 1:1 assistance    Previous:  - Because of waxing/waning alertness levels, patient with a tendency to lean toward the L, requiring repositioning to neutral. Patient able to tolerate upright position in order to participate in oral care given a swab, and be presented with one PO trial. Further trials of PO could not be provided 2/2 waxing/waning alertness levels.     Therapeutic Swallow:  Therapeutic Swallow Intervention : PO Trials  Solid Diet Recommendations: Easy to Chew (IDDSI Level 7), Soft & bite sized/chopped (IDDSI Level 6)  Liquid Diet Recommendations: Thin (IDDSI Level 0)  Swallow Comments: STRATEGIES: ONLY FEED WHEN AWAKE/ALERT, 1:1 feeding, slow rate, small bites/sips, meds as best tolerated, strict oral care      Inpatient:    Education:  Learner patient   Barriers to Learning acuteness of illness barrier; cognitive limitations barrier   Method verbal   Education - Topic ST provided patient education regarding role of ST, goals of treatment, and plan of care. Patient verbalized comprehension, consistent with cognitive status. Education will be reinforced. ST further coordinated with RN regarding recommendations and precautions per this assessment, with RN verbalizing understanding.     Outcome    needs instruction; needs review/reinforcement; partially meets goals/outcomes

## 2024-11-06 NOTE — NURSING NOTE
Hospice meeting scheduled for 2pm today bedside with son López, he is hoping to have pt transfer to [facility].    220P  Hospice consents signed, plan for transfer to [facility] today. [facility] RN arranging transport

## 2024-11-06 NOTE — CARE PLAN
The patient's goals for the shift include      The clinical goals for the shift include Patient will be free of aspiration throughout shift.      Problem: Nutrition  Goal: Less than 5 days NPO/clear liquids  Outcome: Progressing  Goal: Oral intake greater than 50%  Outcome: Progressing  Goal: Consume prescribed supplement  Outcome: Progressing  Goal: Nutrition support goals are met within 48 hrs  Outcome: Progressing  Goal: Nutrition support is meeting 75% of nutrient needs  Outcome: Progressing  Goal: Tube feed tolerance  Outcome: Progressing  Goal: Lab values WNL  Outcome: Progressing  Goal: Electrolytes WNL  Outcome: Progressing  Goal: Promote healing  Outcome: Progressing  Goal: Maintain stable weight  Outcome: Progressing  Goal: Reduce weight from edema/fluid  Outcome: Progressing  Goal: Gradual weight gain  Outcome: Progressing  Goal: BG  mg/dL  Outcome: Progressing     Problem: Pain - Adult  Goal: Verbalizes/displays adequate comfort level or baseline comfort level  Outcome: Progressing     Problem: Safety - Adult  Goal: Free from fall injury  Outcome: Progressing     Problem: Discharge Planning  Goal: Discharge to home or other facility with appropriate resources  Outcome: Progressing     Problem: Chronic Conditions and Co-morbidities  Goal: Patient's chronic conditions and co-morbidity symptoms are monitored and maintained or improved  Outcome: Progressing     Problem: Discharge Planning  Goal: Discharge to home or other facility with appropriate resources  Outcome: Progressing     Problem: Diabetes  Goal: Achieve decreasing blood glucose levels by end of shift  Outcome: Progressing  Goal: Increase stability of blood glucose readings by end of shift  Outcome: Progressing  Goal: Decrease in ketones present in urine by end of shift  Outcome: Progressing  Goal: Maintain electrolyte levels within acceptable range throughout shift  Outcome: Progressing  Goal: Maintain glucose levels >70mg/dl to <250mg/dl  throughout shift  Outcome: Progressing  Goal: No changes in neurological exam by end of shift  Outcome: Progressing  Goal: Learn about and adhere to nutrition recommendations by end of shift  Outcome: Progressing  Goal: Vital signs within normal range for age by end of shift  Outcome: Progressing  Goal: Increase self care and/or family involovement by end of shift  Outcome: Progressing  Goal: Receive DSME education by end of shift  Outcome: Progressing

## 2024-11-06 NOTE — CARE PLAN
The patient's goals for the shift include      The clinical goals for the shift include Patient will be free of aspiration throughout shift.      Problem: Skin  Goal: Promote/optimize nutrition  11/6/2024 0517 by Can Buck RN  Flowsheets (Taken 11/6/2024 0517)  Promote/optimize nutrition: Monitor/record intake including meals  11/5/2024 1954 by Can Buck RN  Outcome: Progressing

## 2024-11-06 NOTE — NURSING NOTE
EOS Note Pt has remained HDS throughout shift. Pt remains on tube feed running at 60ml/hr with a 300ml flush Q4. Pt is on room air, afib/tele, AO x 1. Pt is being discharged to [facility] care today with transport coming around 730 PM. Pt's hands are contracted and her NG tube will be removed prior to discharge. Pt's ruano will remain for transport. Pt is resting comfortably in bed, alarms on and call light within reach.

## 2024-11-06 NOTE — PROGRESS NOTES
"    Endocrinology Inpatient Consult Progress Note     PATIENT NAME: Bing Holliday  MRN: [id number]  DATE: 11/6/2024    CONSULTING PHYSICIAN: Dr. CHIDI Felipe  REASON FOR CONSULT: Adrenal Insufficiency.      Interval Events     Transferred to the regular nursing floor.  The patient is much more alert.  She is able to tell me her name, where she is, and the year.  She states that she is tired.  She feels like she is weak.      Physical Examination     /70 (BP Location: Right arm, Patient Position: Lying)   Pulse 100   Temp 35.5 °C (95.9 °F) (Temporal)   Resp 16   Ht 1.549 m (5' 1\")   Wt 84.4 kg (186 lb 1.1 oz)   LMP  (LMP Unknown)   SpO2 100%   BMI 35.16 kg/m²   No acute distress.  Appears chronically ill.  Sullen.  Cachectic.  Alert and oriented x 3.  Corpak in situ.  Regular rate and rhythm.  Nonlabored respiration.  Soft nontender nondistended obese abdomen.  Trace pedal edema bilaterally.      Medications     Reviewed MAR       Data     Recent Labs and Imaging Reviewed      Assessment / Plan        # Secondary Adrenal Insufficiency: As per my previous note.  Clinically improved.  On the regular nursing floor.  She was transition to oral hydrocortisone by internal medicine team.    # T2DM: Sugars remain high.  They are not unreasonable though mostly in the upper 100s to low 200 range.  She is on scheduled insulin.  Her sugars are likely exacerbated by the fact that she was on stress dose steroids as well as having tube feeds started.  I would like to continue her insulin glargine at 8 units at bedtime.  Continue regular insulin 3 units every 6 hours.    # Abnormal TFT: Has been worked up in the past.  No TSH greater than 10.  Her TSH on arrival was in the 4 range.  No indication for levothyroxine from my perspective.    # Osteoporosis: Definitely exacerbated by the fact that she keeps coming in with adrenal crisis and getting intravenous stress dose glucocorticoids.  Unfortunately think the patient has " been seen in the outpatient as she spends most of her time in the hospital.  Perhaps we can consider giving her a dose of zoledronic acid.       Avi Sloan, DO  Endocrinology, Diabetes, and Metabolism    Available via [employer] Messenger    Please excuse any typographical or unwanted errors within this documentation as voice recognition software was used to dictate this note.

## 2024-11-12 ENCOUNTER — TELEPHONE (OUTPATIENT)
Dept: PRIMARY CARE | Facility: CLINIC | Age: 64
End: 2024-11-12
Payer: MEDICARE

## (undated) DEVICE — INTRODUCER SYSTEM, PRELUDE SNAP, SPLITTABLE, HEMOSTATIC, 7FR

## (undated) DEVICE — SHIELD, SCOOP FENESTRATION, SCATPAD, ABSORBENT, DUAL, LEFT SUB/ C

## (undated) DEVICE — DRESSING, MEPILEX BORDER, POST-OP AG, 4 X 6 IN

## (undated) DEVICE — HEMOSTAT, ARISTA, ABSORBABLE, 3 GRAMS

## (undated) DEVICE — INTRODUCER SYSTEM, PRELUDE SNAP, SPLITTABLE, 9 FR X 13 CM